# Patient Record
Sex: MALE | Race: ASIAN | NOT HISPANIC OR LATINO | URBAN - METROPOLITAN AREA
[De-identification: names, ages, dates, MRNs, and addresses within clinical notes are randomized per-mention and may not be internally consistent; named-entity substitution may affect disease eponyms.]

---

## 2017-08-15 ENCOUNTER — INPATIENT (INPATIENT)
Facility: HOSPITAL | Age: 52
LOS: 1 days | Discharge: HOME | End: 2017-08-17
Attending: INTERNAL MEDICINE | Admitting: INTERNAL MEDICINE
Payer: MEDICAID

## 2017-08-15 VITALS
WEIGHT: 160.06 LBS | HEIGHT: 67 IN | OXYGEN SATURATION: 98 % | SYSTOLIC BLOOD PRESSURE: 155 MMHG | DIASTOLIC BLOOD PRESSURE: 89 MMHG | RESPIRATION RATE: 115 BRPM

## 2017-08-15 LAB
APTT BLD: 25.9 SEC — LOW (ref 27.5–37.4)
BASOPHILS # BLD AUTO: 0.1 K/UL — SIGNIFICANT CHANGE UP (ref 0–0.2)
BASOPHILS NFR BLD AUTO: 1.8 % — SIGNIFICANT CHANGE UP (ref 0–2)
EOSINOPHIL # BLD AUTO: 0.1 K/UL — SIGNIFICANT CHANGE UP (ref 0–0.5)
EOSINOPHIL NFR BLD AUTO: 2 % — SIGNIFICANT CHANGE UP (ref 0–6)
HCT VFR BLD CALC: 45.5 % — SIGNIFICANT CHANGE UP (ref 39–50)
HGB BLD-MCNC: 15.5 G/DL — SIGNIFICANT CHANGE UP (ref 13–17)
INR BLD: 0.99 RATIO — SIGNIFICANT CHANGE UP (ref 0.88–1.16)
LACTATE SERPL-SCNC: 5.5 MMOL/L — CRITICAL HIGH (ref 0.7–2)
LYMPHOCYTES # BLD AUTO: 2 K/UL — SIGNIFICANT CHANGE UP (ref 1–3.3)
LYMPHOCYTES # BLD AUTO: 42.8 % — SIGNIFICANT CHANGE UP (ref 13–44)
MCHC RBC-ENTMCNC: 29.7 PG — SIGNIFICANT CHANGE UP (ref 27–34)
MCHC RBC-ENTMCNC: 34.1 GM/DL — SIGNIFICANT CHANGE UP (ref 32–36)
MCV RBC AUTO: 87.1 FL — SIGNIFICANT CHANGE UP (ref 80–100)
MONOCYTES # BLD AUTO: 0.5 K/UL — SIGNIFICANT CHANGE UP (ref 0–0.9)
MONOCYTES NFR BLD AUTO: 9.8 % — SIGNIFICANT CHANGE UP (ref 2–14)
NEUTROPHILS # BLD AUTO: 2 K/UL — SIGNIFICANT CHANGE UP (ref 1.8–7.4)
NEUTROPHILS NFR BLD AUTO: 43.5 % — SIGNIFICANT CHANGE UP (ref 43–77)
PLATELET # BLD AUTO: 261 K/UL — SIGNIFICANT CHANGE UP (ref 150–400)
PROTHROM AB SERPL-ACNC: 10.8 SEC — SIGNIFICANT CHANGE UP (ref 9.8–12.7)
RBC # BLD: 5.22 M/UL — SIGNIFICANT CHANGE UP (ref 4.2–5.8)
RBC # FLD: 12.7 % — SIGNIFICANT CHANGE UP (ref 11–15)
WBC # BLD: 4.6 K/UL — SIGNIFICANT CHANGE UP (ref 3.8–10.5)
WBC # FLD AUTO: 4.6 K/UL — SIGNIFICANT CHANGE UP (ref 3.8–10.5)

## 2017-08-15 PROCEDURE — 99284 EMERGENCY DEPT VISIT MOD MDM: CPT

## 2017-08-15 PROCEDURE — 71010: CPT | Mod: 26

## 2017-08-15 RX ORDER — OCTREOTIDE ACETATE 200 UG/ML
50 INJECTION, SOLUTION INTRAVENOUS; SUBCUTANEOUS ONCE
Qty: 0 | Refills: 0 | Status: COMPLETED | OUTPATIENT
Start: 2017-08-15 | End: 2017-08-15

## 2017-08-15 RX ORDER — ONDANSETRON 8 MG/1
4 TABLET, FILM COATED ORAL ONCE
Qty: 0 | Refills: 0 | Status: DISCONTINUED | OUTPATIENT
Start: 2017-08-15 | End: 2017-08-15

## 2017-08-15 RX ORDER — PIPERACILLIN AND TAZOBACTAM 4; .5 G/20ML; G/20ML
3.38 INJECTION, POWDER, LYOPHILIZED, FOR SOLUTION INTRAVENOUS ONCE
Qty: 0 | Refills: 0 | Status: COMPLETED | OUTPATIENT
Start: 2017-08-15 | End: 2017-08-15

## 2017-08-15 RX ORDER — SODIUM CHLORIDE 9 MG/ML
1000 INJECTION INTRAMUSCULAR; INTRAVENOUS; SUBCUTANEOUS ONCE
Qty: 0 | Refills: 0 | Status: COMPLETED | OUTPATIENT
Start: 2017-08-15 | End: 2017-08-15

## 2017-08-15 RX ORDER — ONDANSETRON 8 MG/1
8 TABLET, FILM COATED ORAL ONCE
Qty: 0 | Refills: 0 | Status: COMPLETED | OUTPATIENT
Start: 2017-08-15 | End: 2017-08-15

## 2017-08-15 RX ORDER — MORPHINE SULFATE 50 MG/1
4 CAPSULE, EXTENDED RELEASE ORAL ONCE
Qty: 0 | Refills: 0 | Status: DISCONTINUED | OUTPATIENT
Start: 2017-08-15 | End: 2017-08-15

## 2017-08-15 RX ORDER — SODIUM CHLORIDE 9 MG/ML
1000 INJECTION INTRAMUSCULAR; INTRAVENOUS; SUBCUTANEOUS
Qty: 0 | Refills: 0 | Status: COMPLETED | OUTPATIENT
Start: 2017-08-15 | End: 2017-08-15

## 2017-08-15 RX ORDER — SODIUM CHLORIDE 9 MG/ML
3 INJECTION INTRAMUSCULAR; INTRAVENOUS; SUBCUTANEOUS ONCE
Qty: 0 | Refills: 0 | Status: COMPLETED | OUTPATIENT
Start: 2017-08-15 | End: 2017-08-15

## 2017-08-15 RX ORDER — PANTOPRAZOLE SODIUM 20 MG/1
80 TABLET, DELAYED RELEASE ORAL ONCE
Qty: 0 | Refills: 0 | Status: COMPLETED | OUTPATIENT
Start: 2017-08-15 | End: 2017-08-15

## 2017-08-15 RX ADMIN — SODIUM CHLORIDE 3 MILLILITER(S): 9 INJECTION INTRAMUSCULAR; INTRAVENOUS; SUBCUTANEOUS at 22:31

## 2017-08-15 RX ADMIN — SODIUM CHLORIDE 2000 MILLILITER(S): 9 INJECTION INTRAMUSCULAR; INTRAVENOUS; SUBCUTANEOUS at 22:31

## 2017-08-15 RX ADMIN — PANTOPRAZOLE SODIUM 80 MILLIGRAM(S): 20 TABLET, DELAYED RELEASE ORAL at 22:46

## 2017-08-15 RX ADMIN — MORPHINE SULFATE 4 MILLIGRAM(S): 50 CAPSULE, EXTENDED RELEASE ORAL at 22:46

## 2017-08-15 RX ADMIN — MORPHINE SULFATE 4 MILLIGRAM(S): 50 CAPSULE, EXTENDED RELEASE ORAL at 23:25

## 2017-08-15 RX ADMIN — SODIUM CHLORIDE 2000 MILLILITER(S): 9 INJECTION INTRAMUSCULAR; INTRAVENOUS; SUBCUTANEOUS at 22:46

## 2017-08-15 RX ADMIN — ONDANSETRON 8 MILLIGRAM(S): 8 TABLET, FILM COATED ORAL at 22:46

## 2017-08-15 RX ADMIN — OCTREOTIDE ACETATE 50 MICROGRAM(S): 200 INJECTION, SOLUTION INTRAVENOUS; SUBCUTANEOUS at 23:47

## 2017-08-15 RX ADMIN — PIPERACILLIN AND TAZOBACTAM 200 GRAM(S): 4; .5 INJECTION, POWDER, LYOPHILIZED, FOR SOLUTION INTRAVENOUS at 23:17

## 2017-08-15 NOTE — ED ADULT NURSE NOTE - OBJECTIVE STATEMENT
pt c/o throat, chest, abd pain for 2 days with N/V. Vomited x2 at brown emesis with mucus. Redness to upper and lower lips. Denies diarhrea pt c/o throat, chest, abd pain for 2 days with N/V. Vomited x2 at brown emesis with mucus. Clots noted. Redness to upper and lower lips. Denies diarrhea

## 2017-08-15 NOTE — ED ADULT TRIAGE NOTE - CHIEF COMPLAINT QUOTE
pt c/o abd pain and vomiting x 30 minutes.  pt had wedding over weekend and had increased drinking and eating.  son states father is a daily drinker.

## 2017-08-16 DIAGNOSIS — E78.2 MIXED HYPERLIPIDEMIA: ICD-10-CM

## 2017-08-16 DIAGNOSIS — Z29.9 ENCOUNTER FOR PROPHYLACTIC MEASURES, UNSPECIFIED: ICD-10-CM

## 2017-08-16 DIAGNOSIS — F10.10 ALCOHOL ABUSE, UNCOMPLICATED: ICD-10-CM

## 2017-08-16 DIAGNOSIS — K76.0 FATTY (CHANGE OF) LIVER, NOT ELSEWHERE CLASSIFIED: ICD-10-CM

## 2017-08-16 DIAGNOSIS — K92.0 HEMATEMESIS: ICD-10-CM

## 2017-08-16 DIAGNOSIS — K27.9 PEPTIC ULCER, SITE UNSPECIFIED, UNSPECIFIED AS ACUTE OR CHRONIC, WITHOUT HEMORRHAGE OR PERFORATION: ICD-10-CM

## 2017-08-16 LAB
ALBUMIN SERPL ELPH-MCNC: 3.4 G/DL — SIGNIFICANT CHANGE UP (ref 3.3–5)
ALBUMIN SERPL ELPH-MCNC: 3.8 G/DL — SIGNIFICANT CHANGE UP (ref 3.3–5)
ALP SERPL-CCNC: 32 U/L — LOW (ref 40–120)
ALP SERPL-CCNC: 40 U/L — SIGNIFICANT CHANGE UP (ref 40–120)
ALT FLD-CCNC: 59 U/L — SIGNIFICANT CHANGE UP (ref 12–78)
ALT FLD-CCNC: 71 U/L — SIGNIFICANT CHANGE UP (ref 12–78)
ANION GAP SERPL CALC-SCNC: 17 MMOL/L — SIGNIFICANT CHANGE UP (ref 5–17)
ANION GAP SERPL CALC-SCNC: 18 MMOL/L — HIGH (ref 5–17)
APPEARANCE UR: ABNORMAL
AST SERPL-CCNC: 57 U/L — HIGH (ref 15–37)
AST SERPL-CCNC: 76 U/L — HIGH (ref 15–37)
BACTERIA # UR AUTO: ABNORMAL
BILIRUB SERPL-MCNC: 0.7 MG/DL — SIGNIFICANT CHANGE UP (ref 0.2–1.2)
BILIRUB SERPL-MCNC: 1 MG/DL — SIGNIFICANT CHANGE UP (ref 0.2–1.2)
BILIRUB UR-MCNC: NEGATIVE — SIGNIFICANT CHANGE UP
BLD GP AB SCN SERPL QL: SIGNIFICANT CHANGE UP
BUN SERPL-MCNC: 11 MG/DL — SIGNIFICANT CHANGE UP (ref 7–23)
BUN SERPL-MCNC: 9 MG/DL — SIGNIFICANT CHANGE UP (ref 7–23)
CALCIUM SERPL-MCNC: 7.8 MG/DL — LOW (ref 8.5–10.1)
CALCIUM SERPL-MCNC: 8.5 MG/DL — SIGNIFICANT CHANGE UP (ref 8.5–10.1)
CHLORIDE SERPL-SCNC: 104 MMOL/L — SIGNIFICANT CHANGE UP (ref 96–108)
CHLORIDE SERPL-SCNC: 99 MMOL/L — SIGNIFICANT CHANGE UP (ref 96–108)
CK MB BLD-MCNC: 0.7 % — SIGNIFICANT CHANGE UP (ref 0–3.5)
CK MB CFR SERPL CALC: 3.7 NG/ML — HIGH (ref 0.5–3.6)
CK SERPL-CCNC: 536 U/L — HIGH (ref 26–308)
CO2 SERPL-SCNC: 22 MMOL/L — SIGNIFICANT CHANGE UP (ref 22–31)
CO2 SERPL-SCNC: 25 MMOL/L — SIGNIFICANT CHANGE UP (ref 22–31)
COLOR SPEC: YELLOW — SIGNIFICANT CHANGE UP
COMMENT - URINE: SIGNIFICANT CHANGE UP
CREAT SERPL-MCNC: 0.9 MG/DL — SIGNIFICANT CHANGE UP (ref 0.5–1.3)
CREAT SERPL-MCNC: 0.92 MG/DL — SIGNIFICANT CHANGE UP (ref 0.5–1.3)
DIFF PNL FLD: ABNORMAL
EPI CELLS # UR: SIGNIFICANT CHANGE UP
GLUCOSE SERPL-MCNC: 123 MG/DL — HIGH (ref 70–99)
GLUCOSE SERPL-MCNC: 82 MG/DL — SIGNIFICANT CHANGE UP (ref 70–99)
GLUCOSE UR QL: NEGATIVE MG/DL — SIGNIFICANT CHANGE UP
GRAN CASTS # UR COMP ASSIST: ABNORMAL /LPF
HCT VFR BLD CALC: 39.9 % — SIGNIFICANT CHANGE UP (ref 39–50)
HCT VFR BLD CALC: 40.2 % — SIGNIFICANT CHANGE UP (ref 39–50)
HGB BLD-MCNC: 12.7 G/DL — LOW (ref 13–17)
HGB BLD-MCNC: 12.8 G/DL — LOW (ref 13–17)
KETONES UR-MCNC: ABNORMAL
LACTATE SERPL-SCNC: 1.1 MMOL/L — SIGNIFICANT CHANGE UP (ref 0.7–2)
LACTATE SERPL-SCNC: 3.7 MMOL/L — HIGH (ref 0.7–2)
LEUKOCYTE ESTERASE UR-ACNC: NEGATIVE — SIGNIFICANT CHANGE UP
MAGNESIUM SERPL-MCNC: 2 MG/DL — SIGNIFICANT CHANGE UP (ref 1.6–2.6)
MCHC RBC-ENTMCNC: 29.2 PG — SIGNIFICANT CHANGE UP (ref 27–34)
MCHC RBC-ENTMCNC: 29.3 PG — SIGNIFICANT CHANGE UP (ref 27–34)
MCHC RBC-ENTMCNC: 31.9 GM/DL — LOW (ref 32–36)
MCHC RBC-ENTMCNC: 31.9 GM/DL — LOW (ref 32–36)
MCV RBC AUTO: 91.5 FL — SIGNIFICANT CHANGE UP (ref 80–100)
MCV RBC AUTO: 91.9 FL — SIGNIFICANT CHANGE UP (ref 80–100)
NITRITE UR-MCNC: NEGATIVE — SIGNIFICANT CHANGE UP
OB PNL STL: NEGATIVE — SIGNIFICANT CHANGE UP
PH UR: 8 — SIGNIFICANT CHANGE UP (ref 5–8)
PHOSPHATE SERPL-MCNC: 3.4 MG/DL — SIGNIFICANT CHANGE UP (ref 2.5–4.5)
PLATELET # BLD AUTO: 173 K/UL — SIGNIFICANT CHANGE UP (ref 150–400)
PLATELET # BLD AUTO: 185 K/UL — SIGNIFICANT CHANGE UP (ref 150–400)
POTASSIUM SERPL-MCNC: 3.1 MMOL/L — LOW (ref 3.5–5.3)
POTASSIUM SERPL-MCNC: 4 MMOL/L — SIGNIFICANT CHANGE UP (ref 3.5–5.3)
POTASSIUM SERPL-SCNC: 3.1 MMOL/L — LOW (ref 3.5–5.3)
POTASSIUM SERPL-SCNC: 4 MMOL/L — SIGNIFICANT CHANGE UP (ref 3.5–5.3)
PROT SERPL-MCNC: 6.8 GM/DL — SIGNIFICANT CHANGE UP (ref 6–8.3)
PROT SERPL-MCNC: 7.7 GM/DL — SIGNIFICANT CHANGE UP (ref 6–8.3)
PROT UR-MCNC: 30 MG/DL
RBC # BLD: 4.36 M/UL — SIGNIFICANT CHANGE UP (ref 4.2–5.8)
RBC # BLD: 4.38 M/UL — SIGNIFICANT CHANGE UP (ref 4.2–5.8)
RBC # FLD: 13 % — SIGNIFICANT CHANGE UP (ref 11–15)
RBC # FLD: 13.5 % — SIGNIFICANT CHANGE UP (ref 11–15)
RBC CASTS # UR COMP ASSIST: SIGNIFICANT CHANGE UP /HPF (ref 0–4)
SODIUM SERPL-SCNC: 142 MMOL/L — SIGNIFICANT CHANGE UP (ref 135–145)
SODIUM SERPL-SCNC: 143 MMOL/L — SIGNIFICANT CHANGE UP (ref 135–145)
SP GR SPEC: 1.01 — SIGNIFICANT CHANGE UP (ref 1.01–1.02)
TROPONIN I SERPL-MCNC: <.015 NG/ML — SIGNIFICANT CHANGE UP (ref 0.01–0.04)
UROBILINOGEN FLD QL: NEGATIVE MG/DL — SIGNIFICANT CHANGE UP
WBC # BLD: 6.3 K/UL — SIGNIFICANT CHANGE UP (ref 3.8–10.5)
WBC # BLD: 9.4 K/UL — SIGNIFICANT CHANGE UP (ref 3.8–10.5)
WBC # FLD AUTO: 6.3 K/UL — SIGNIFICANT CHANGE UP (ref 3.8–10.5)
WBC # FLD AUTO: 9.4 K/UL — SIGNIFICANT CHANGE UP (ref 3.8–10.5)
WBC UR QL: SIGNIFICANT CHANGE UP

## 2017-08-16 PROCEDURE — 74174 CTA ABD&PLVS W/CONTRAST: CPT | Mod: 26

## 2017-08-16 PROCEDURE — 88312 SPECIAL STAINS GROUP 1: CPT | Mod: 26

## 2017-08-16 PROCEDURE — 93010 ELECTROCARDIOGRAM REPORT: CPT

## 2017-08-16 PROCEDURE — 12345: CPT | Mod: NC

## 2017-08-16 PROCEDURE — 99223 1ST HOSP IP/OBS HIGH 75: CPT

## 2017-08-16 PROCEDURE — 88305 TISSUE EXAM BY PATHOLOGIST: CPT | Mod: 26

## 2017-08-16 RX ORDER — SODIUM CHLORIDE 9 MG/ML
1000 INJECTION, SOLUTION INTRAVENOUS
Qty: 0 | Refills: 0 | Status: DISCONTINUED | OUTPATIENT
Start: 2017-08-16 | End: 2017-08-16

## 2017-08-16 RX ORDER — PANTOPRAZOLE SODIUM 20 MG/1
40 TABLET, DELAYED RELEASE ORAL EVERY 12 HOURS
Qty: 0 | Refills: 0 | Status: DISCONTINUED | OUTPATIENT
Start: 2017-08-16 | End: 2017-08-17

## 2017-08-16 RX ORDER — MORPHINE SULFATE 50 MG/1
2 CAPSULE, EXTENDED RELEASE ORAL ONCE
Qty: 0 | Refills: 0 | Status: DISCONTINUED | OUTPATIENT
Start: 2017-08-16 | End: 2017-08-16

## 2017-08-16 RX ORDER — METOCLOPRAMIDE HCL 10 MG
10 TABLET ORAL EVERY 8 HOURS
Qty: 0 | Refills: 0 | Status: DISCONTINUED | OUTPATIENT
Start: 2017-08-16 | End: 2017-08-17

## 2017-08-16 RX ORDER — OCTREOTIDE ACETATE 200 UG/ML
50 INJECTION, SOLUTION INTRAVENOUS; SUBCUTANEOUS
Qty: 500 | Refills: 0 | Status: DISCONTINUED | OUTPATIENT
Start: 2017-08-16 | End: 2017-08-16

## 2017-08-16 RX ORDER — LIDOCAINE 4 G/100G
10 CREAM TOPICAL ONCE
Qty: 0 | Refills: 0 | Status: COMPLETED | OUTPATIENT
Start: 2017-08-16 | End: 2017-08-16

## 2017-08-16 RX ORDER — MORPHINE SULFATE 50 MG/1
1 CAPSULE, EXTENDED RELEASE ORAL EVERY 4 HOURS
Qty: 0 | Refills: 0 | Status: DISCONTINUED | OUTPATIENT
Start: 2017-08-16 | End: 2017-08-17

## 2017-08-16 RX ORDER — BENZOCAINE AND MENTHOL 5; 1 G/100ML; G/100ML
1 LIQUID ORAL EVERY 8 HOURS
Qty: 0 | Refills: 0 | Status: DISCONTINUED | OUTPATIENT
Start: 2017-08-16 | End: 2017-08-17

## 2017-08-16 RX ORDER — SODIUM CHLORIDE 9 MG/ML
1000 INJECTION INTRAMUSCULAR; INTRAVENOUS; SUBCUTANEOUS
Qty: 0 | Refills: 0 | Status: DISCONTINUED | OUTPATIENT
Start: 2017-08-16 | End: 2017-08-17

## 2017-08-16 RX ORDER — MORPHINE SULFATE 50 MG/1
4 CAPSULE, EXTENDED RELEASE ORAL ONCE
Qty: 0 | Refills: 0 | Status: DISCONTINUED | OUTPATIENT
Start: 2017-08-16 | End: 2017-08-16

## 2017-08-16 RX ORDER — ATORVASTATIN CALCIUM 80 MG/1
20 TABLET, FILM COATED ORAL AT BEDTIME
Qty: 0 | Refills: 0 | Status: DISCONTINUED | OUTPATIENT
Start: 2017-08-16 | End: 2017-08-17

## 2017-08-16 RX ORDER — LIDOCAINE 4 G/100G
2 CREAM TOPICAL ONCE
Qty: 0 | Refills: 0 | Status: COMPLETED | OUTPATIENT
Start: 2017-08-16 | End: 2017-08-16

## 2017-08-16 RX ORDER — POTASSIUM CHLORIDE 20 MEQ
40 PACKET (EA) ORAL ONCE
Qty: 0 | Refills: 0 | Status: COMPLETED | OUTPATIENT
Start: 2017-08-16 | End: 2017-08-16

## 2017-08-16 RX ADMIN — MORPHINE SULFATE 1 MILLIGRAM(S): 50 CAPSULE, EXTENDED RELEASE ORAL at 22:30

## 2017-08-16 RX ADMIN — MORPHINE SULFATE 1 MILLIGRAM(S): 50 CAPSULE, EXTENDED RELEASE ORAL at 04:47

## 2017-08-16 RX ADMIN — LIDOCAINE 10 MILLILITER(S): 4 CREAM TOPICAL at 00:44

## 2017-08-16 RX ADMIN — BENZOCAINE AND MENTHOL 1 LOZENGE: 5; 1 LIQUID ORAL at 10:26

## 2017-08-16 RX ADMIN — MORPHINE SULFATE 4 MILLIGRAM(S): 50 CAPSULE, EXTENDED RELEASE ORAL at 02:45

## 2017-08-16 RX ADMIN — ATORVASTATIN CALCIUM 20 MILLIGRAM(S): 80 TABLET, FILM COATED ORAL at 22:31

## 2017-08-16 RX ADMIN — MORPHINE SULFATE 2 MILLIGRAM(S): 50 CAPSULE, EXTENDED RELEASE ORAL at 07:10

## 2017-08-16 RX ADMIN — SODIUM CHLORIDE 2000 MILLILITER(S): 9 INJECTION INTRAMUSCULAR; INTRAVENOUS; SUBCUTANEOUS at 00:28

## 2017-08-16 RX ADMIN — MORPHINE SULFATE 1 MILLIGRAM(S): 50 CAPSULE, EXTENDED RELEASE ORAL at 17:00

## 2017-08-16 RX ADMIN — SODIUM CHLORIDE 100 MILLILITER(S): 9 INJECTION INTRAMUSCULAR; INTRAVENOUS; SUBCUTANEOUS at 13:38

## 2017-08-16 RX ADMIN — MORPHINE SULFATE 1 MILLIGRAM(S): 50 CAPSULE, EXTENDED RELEASE ORAL at 16:44

## 2017-08-16 RX ADMIN — SODIUM CHLORIDE 100 MILLILITER(S): 9 INJECTION, SOLUTION INTRAVENOUS at 15:18

## 2017-08-16 RX ADMIN — SODIUM CHLORIDE 2000 MILLILITER(S): 9 INJECTION INTRAMUSCULAR; INTRAVENOUS; SUBCUTANEOUS at 02:05

## 2017-08-16 RX ADMIN — MORPHINE SULFATE 1 MILLIGRAM(S): 50 CAPSULE, EXTENDED RELEASE ORAL at 22:45

## 2017-08-16 RX ADMIN — LIDOCAINE 2 MILLILITER(S): 4 CREAM TOPICAL at 06:56

## 2017-08-16 RX ADMIN — MORPHINE SULFATE 1 MILLIGRAM(S): 50 CAPSULE, EXTENDED RELEASE ORAL at 05:03

## 2017-08-16 RX ADMIN — PANTOPRAZOLE SODIUM 40 MILLIGRAM(S): 20 TABLET, DELAYED RELEASE ORAL at 05:21

## 2017-08-16 RX ADMIN — MORPHINE SULFATE 1 MILLIGRAM(S): 50 CAPSULE, EXTENDED RELEASE ORAL at 09:46

## 2017-08-16 RX ADMIN — MORPHINE SULFATE 4 MILLIGRAM(S): 50 CAPSULE, EXTENDED RELEASE ORAL at 02:05

## 2017-08-16 RX ADMIN — PANTOPRAZOLE SODIUM 40 MILLIGRAM(S): 20 TABLET, DELAYED RELEASE ORAL at 17:54

## 2017-08-16 RX ADMIN — MORPHINE SULFATE 1 MILLIGRAM(S): 50 CAPSULE, EXTENDED RELEASE ORAL at 10:00

## 2017-08-16 RX ADMIN — BENZOCAINE AND MENTHOL 1 LOZENGE: 5; 1 LIQUID ORAL at 13:38

## 2017-08-16 RX ADMIN — OCTREOTIDE ACETATE 10 MICROGRAM(S)/HR: 200 INJECTION, SOLUTION INTRAVENOUS; SUBCUTANEOUS at 03:53

## 2017-08-16 RX ADMIN — MORPHINE SULFATE 2 MILLIGRAM(S): 50 CAPSULE, EXTENDED RELEASE ORAL at 06:54

## 2017-08-16 RX ADMIN — Medication 30 MILLILITER(S): at 00:38

## 2017-08-16 RX ADMIN — BENZOCAINE AND MENTHOL 1 LOZENGE: 5; 1 LIQUID ORAL at 22:31

## 2017-08-16 RX ADMIN — SODIUM CHLORIDE 100 MILLILITER(S): 9 INJECTION INTRAMUSCULAR; INTRAVENOUS; SUBCUTANEOUS at 03:53

## 2017-08-16 NOTE — H&P ADULT - PROBLEM SELECTOR PLAN 1
UGI bleed most likely non-variceal bleed vs esophagitis vs PUD/bleeding ulcer  - Proton pump inhibitor IV,   - Maintain NPO  - 2 large gouge IV lines  - Monitor serial hemoglobin  - Monitor electrolytes and replace as needed, K supplemented in ED  -Repeat lactate  - Avoid antiplatelets and antithrombotic medication  -GI consult

## 2017-08-16 NOTE — H&P ADULT - NSHPREVIEWOFSYSTEMS_GEN_ALL_CORE
No fever/chills, No photophobia/eye pain/changes in vision,  No chest pain/palpitations, no SOB/cough/wheeze/stridor, No diarrhea, no dark stools, no dysuria/frequency/discharge, No neck/back pain, no rash, no changes in neurological status/function.

## 2017-08-16 NOTE — ED PROVIDER NOTE - OBJECTIVE STATEMENT
52yoM; with pmh signif for alcohol abuse, PUD; now p/w coffee ground emesis and black tarry stools x2days. patient drink 1-2 glasses of vodka daily, last drink 2 days ago. denies nsaid use. admitted to [facility] 1 week ago for ?pud.  now p/w black tarry stools x2 days and coffee ground emesis x1 day--x7-8 episodes. c/o abd pain--epigastric, cramping, intermittent, radiaing through chest and throat with burning.    c/o lightheadedness and near syncope with standing.    denies dysuria, hematuria, frequency, urgency. denies headache.  denies numbness/tingling. denies focal weakness.

## 2017-08-16 NOTE — PROGRESS NOTE ADULT - SUBJECTIVE AND OBJECTIVE BOX
Upper esophagogastroduodenoscopy/ENDOSCOPY    -Informed consent obtained from patient prior to exam.     Indication: UGI bleed    Anesthesia:   provided by:    Esophogus: ulcerative esophagitis.Bx, no varices    Stomach:diffuse eythema,bx antrum and fundus    Duodenum: normal,bx second portion    The patient tolerated the procedure well.    Findings:Ulcerative Esophagitis  Gastritis    Plan: Protonix 40 mg PO daily for 8 weeks then repeat egd

## 2017-08-16 NOTE — ED PROVIDER NOTE - PHYSICAL EXAMINATION
General:     NAD, well-nourished, well-appearing  Head:     NC/AT, EOMI, oral mucosa moist  Neck:     trachea midline  Lungs:     CTA b/l, no w/r/r  CVS:     S1S2, RRR, no m/g/r  Abd:     +BS, ttp @ epigastrium, no rebound or guarding, s/nd, no organomegaly  Ext:    2+ radial and pedal pulses, no c/c/e  Neuro: AAOx3, no sensory/motor deficits

## 2017-08-16 NOTE — H&P ADULT - ASSESSMENT
52 year old man with PMH gerd and HLD presents with complaint of upper abdominal pain, nausea, and several episodes of vomiting streaked with blood; signs, symptoms, and work up consistent for possible bleeding gastric ulcer.  Patient will require admission for at least 2 midnights as detailed below:

## 2017-08-16 NOTE — CONSULT NOTE ADULT - SUBJECTIVE AND OBJECTIVE BOX
Chief Complaint:  Patient is a 52y old  Male who presents with a chief complaint of abdominal pain, hematemesis (16 Aug 2017 03:36)      HPI:  52 year old man with PMH gerd and HLD presents with complaint of upper abdominal pain, nausea, and several episodes of vomiting streaked with blood and Coffee ground emesis.  He states that he has had similar pain with nausea for many months.  He drinks whiskey socially, about once or twice per week.  He says his daughter was recently  and he "overdid it" and had several whiskies to celebrate.  He was seen in [facility] 2 weeks ago for the same (but less severe) complaint and was discharged with outpatient GI follow up.       PMH/PSH:PAST MEDICAL & SURGICAL HISTORY:  Mixed hyperlipidemia  PUD (peptic ulcer disease)  No significant past surgical history      Allergies:  No Known Allergies      Medications:  metoclopramide Injectable 10 milliGRAM(s) IV Push every 8 hours PRN  pantoprazole  Injectable 40 milliGRAM(s) IV Push every 12 hours  sodium chloride 0.9%. 1000 milliLiter(s) IV Continuous <Continuous>  atorvastatin 20 milliGRAM(s) Oral at bedtime  morphine  - Injectable 1 milliGRAM(s) IV Push every 4 hours PRN  benzocaine 15 mG/menthol 3.6 mG Lozenge 1 Lozenge Oral every 8 hours      Review of Systems:    General:  No weight loss, fevers, chills, night sweats, fatigue,   Eyes:  Good vision, no reported pain  ENT:  No sore throat, pain, runny nose, dysphagia  CV:  No pain, palpitations, hypo/hypertension  Resp:  No dyspnea, cough, tachypnea, wheezing  GI:  +epigastric pain, + nausea, + vomiting, No diarrhea, No constipation, No pruritis, No rectal bleeding, No tarry stools, No dysphagia,  :  No pain, bleeding, incontinence, nocturia  Muscle:  No pain, weakness  Breast:  No pain, abscess, mass, discharge  Neuro:  No weakness, tingling, memory problems  Psych:  No fatigue, insomnia, mood problems, depression  Endocrine:  No polyuria, polydypsia, cold/heat intolerance  Heme:  No petechiae, ecchymosis, easy bruisability  Skin:  No rash, tattoos, scars, edema    Relevant Family History:   FAMILY HISTORY:  No pertinent family history in first degree relatives      Relevant Social History: Drinks whiskey socially, denies tobacco, illicit drugs, ETOH abuse 4days ago    Physical Exam:    Vital Signs:  Vital Signs Last 24 Hrs  T(C): 36.9 (16 Aug 2017 13:02), Max: 37.7 (16 Aug 2017 04:46)  T(F): 98.5 (16 Aug 2017 13:02), Max: 99.9 (16 Aug 2017 04:46)  HR: 51 (16 Aug 2017 13:02) (51 - 70)  BP: 127/74 (16 Aug 2017 13:02) (118/65 - 168/83)  BP(mean): --  RR: 18 (16 Aug 2017 13:02) (14 - 115)  SpO2: 97% (16 Aug 2017 13:02) (94% - 100%)  Daily Height in cm: 172.72 (16 Aug 2017 05:58)    Daily     General:  Appears stated age, well-groomed, well-nourished, no distress  HEENT:  NC/AT,  conjunctivae clear and pink, no thyromegaly, nodules, adenopathy, no JVD  Chest:  Full & symmetric excursion, no increased effort, breath sounds clear  Cardiovascular:  Regular rhythm, S1, S2, no murmur/rub/S3/S4, no abdominal bruit, no edema  Abdomen:  Soft, +epigastric tenderness, non-distended, normoactive bowel sounds,  no masses , no hepatosplenomegaly, no signs of chronic liver disease  Extremities:  no cyanosis, clubbing or edema  Skin:  No rash/erythema/ecchymoses/petechiae/wounds/abscess/warm/dry  Neuro/Psych:  Alert, oriented, no asterixis, no tremor, no encephalopathy    Laboratory:                          12.8   9.4   )-----------( 185      ( 16 Aug 2017 08:01 )             40.2     08-16    143  |  104  |  9   ----------------------------<  123<H>  4.0   |  22  |  0.90    Ca    7.8<L>      16 Aug 2017 08:01  Phos  3.4     08-16  Mg     2.0     08-16    TPro  6.8  /  Alb  3.4  /  TBili  1.0  /  DBili  x   /  AST  57<H>  /  ALT  59  /  AlkPhos  32<L>      LIVER FUNCTIONS - ( 16 Aug 2017 08:01 )  Alb: 3.4 g/dL / Pro: 6.8 gm/dL / ALK PHOS: 32 U/L / ALT: 59 U/L / AST: 57 U/L / GGT: x           PT/INR - ( 15 Aug 2017 22:09 )   PT: 10.8 sec;   INR: 0.99 ratio         PTT - ( 15 Aug 2017 22:09 )  PTT:25.9 sec  Urinalysis Basic - ( 16 Aug 2017 02:25 )    Color: Yellow / Appearance: x / S.015 / pH: x  Gluc: x / Ketone: Moderate  / Bili: Negative / Urobili: Negative mg/dL   Blood: x / Protein: 30 mg/dL / Nitrite: Negative   Leuk Esterase: Negative / RBC: 0-2 /HPF / WBC 0-2   Sq Epi: x / Non Sq Epi: x / Bacteria: Few          Intake and Output    08-15-17 @ 07:01  -  17 @ 07:00  --------------------------------------------------------  IN: 0 mL / OUT: 500 mL / NET: -500 mL        Imaging:      EXAM:  CT ANGIO ABD PELV (W)AW IC                            PROCEDURE DATE:  2017          INTERPRETATION:  CLINICAL INFORMATION: Abdominal pain and GI bleeding    COMPARISON: None    PROCEDURE:   CT of the Abdomen and Pelvis was performed with and without intravenous   contrast.  Precontrast, Arterial and Delayed phases were performed.  Intravenous contrast: 99 ml Omnipaque 350. 1 ml discarded.  Oral contrast: None.  Sagittal and coronal reformats were performed. Axial MIPS are provided.    FINDINGS:    LOWER CHEST: Within normal limits.    LIVER: Diffuse hepatic steatosis.  BILE DUCTS: Normal caliber.  GALLBLADDER: Sludge in the gallbladder lumen.  SPLEEN: Within normal limits.  PANCREAS: Within normal limits.  ADRENALS: Tiny myolipoma on the medial limb of the right adrenal gland..  KIDNEYS/URETERS: Within normal limits.    BLADDER: Within normal limits.  REPRODUCTIVE ORGANS: The prostate is within normal limits.     BOWEL: No bowel obstruction. Appendix normal. Diverticuli ofthe cecum.   No evidence for active bowel inflammation. No evidence for active GI   bleeding.  PERITONEUM: No ascites.  VESSELS:  Patent vasculature without evidence for active extravasation.  RETROPERITONEUM: No lymphadenopathy.    ABDOMINAL WALL: Within normal limits.  BONES: Within normal limits.    IMPRESSION: No evidence for active GI bleeding.      TAMRA KING M.D., ATTENDING RADIOLOGIST  This document has been electronically signed. Aug 16 2017  2:10AM

## 2017-08-16 NOTE — CONSULT NOTE ADULT - PROBLEM SELECTOR RECOMMENDATION 9
IV PPI Q12  NPO for EGD this afternoon   Risks, benefits and alternatives discussed at length with patient  and informed consent obtained. All questions were answered.

## 2017-08-16 NOTE — H&P ADULT - NSHPLABSRESULTS_GEN_ALL_CORE
Vital Signs Last 24 Hrs  T(C): 36.8 (16 Aug 2017 03:49), Max: 36.8 (16 Aug 2017 03:49)  T(F): 98.2 (16 Aug 2017 03:49), Max: 98.2 (16 Aug 2017 03:49)  HR: 66 (16 Aug 2017 03:49) (65 - 66)  BP: 134/74 (16 Aug 2017 03:49) (119/62 - 168/83)  BP(mean): --  RR: 16 (16 Aug 2017 03:49) (16 - 115)  SpO2: 98% (16 Aug 2017 03:49) (97% - 100%)        LABS:                        15.5   4.6   )-----------( 261      ( 15 Aug 2017 22:09 )             45.5     08-15    142  |  99  |  11  ----------------------------<  82  3.1<L>   |  25  |  0.92    Ca    8.5      15 Aug 2017 23:44    TPro  7.7  /  Alb  3.8  /  TBili  0.7  /  DBili  x   /  AST  76<H>  /  ALT  71  /  AlkPhos  40  08-15    PT/INR - ( 15 Aug 2017 22:09 )   PT: 10.8 sec;   INR: 0.99 ratio         PTT - ( 15 Aug 2017 22:09 )  PTT:25.9 sec  Urinalysis Basic - ( 16 Aug 2017 02:25 )    Color: Yellow / Appearance: x / S.015 / pH: x  Gluc: x / Ketone: Moderate  / Bili: Negative / Urobili: Negative mg/dL   Blood: x / Protein: 30 mg/dL / Nitrite: Negative   Leuk Esterase: Negative / RBC: 0-2 /HPF / WBC 0-2   Sq Epi: x / Non Sq Epi: x / Bacteria: Few        RADIOLOGY & ADDITIONAL STUDIES:    CT ab/plv:  IMPRESSION: No evidence for active GI bleeding.

## 2017-08-16 NOTE — H&P ADULT - HISTORY OF PRESENT ILLNESS
52 year old man with PMH gerd and HLD presents with complaint of upper abdominal pain, nausea, and several episodes of vomiting streaked with blood.  He states that he has had similar pain with nausea for many months.  He drinks whiskey socially, about once or twice per week.  He says his daughter was recently  and he "overdid it" and had several whiskies to celebrate.  He was seen in [facility] 2 weeks ago for the same (but less severe) complaint and was discharged with outpatient GI follow up.     In the ED, h/h stable, vital signs stable, CT ab/plv showed no evidence of active bleeding.  Lactate 5.5-->3.7 after hydration.  LFT mildly elevated.

## 2017-08-16 NOTE — CHART NOTE - NSCHARTNOTEFT_GEN_A_CORE
pt seen and examined, still complaining of abdominal discomfort n/v, seen w dried blood by mouth  states has been vomiting for past 2days noticed bloody streaks. Binge drank over the weekend at party. Normally only drinks on special occasions  Proton pump inhibitor IV,   - Maintain NPO  monitor cbc  IVF  To be seen by GI today     PE normal with exception of some epigastric tenderness    Vital Signs Last 24 Hrs  T(C): 37.7 (16 Aug 2017 05:58), Max: 37.7 (16 Aug 2017 04:46)  T(F): 99.9 (16 Aug 2017 05:58), Max: 99.9 (16 Aug 2017 04:46)  HR: 67 (16 Aug 2017 05:58) (65 - 70)  BP: 135/85 (16 Aug 2017 05:58) (118/65 - 168/83)  BP(mean): --  RR: 18 (16 Aug 2017 05:58) (14 - 115)  SpO2: 94% (16 Aug 2017 05:58) (94% - 100%)                    12.8   9.4   )-----------( 185      ( 16 Aug 2017 08:01 )             40.2     08-16    143  |  104  |  9   ----------------------------<  123<H>  4.0   |  22  |  0.90    Ca    7.8<L>      16 Aug 2017 08:01  Phos  3.4       Mg     2.0         TPro  6.8  /  Alb  3.4  /  TBili  1.0  /  DBili  x   /  AST  57<H>  /  ALT  59  /  AlkPhos  32<L>  -16    PT/INR - ( 15 Aug 2017 22:09 )   PT: 10.8 sec;   INR: 0.99 ratio         PTT - ( 15 Aug 2017 22:09 )  PTT:25.9 sec  Urinalysis Basic - ( 16 Aug 2017 02:25 )    Color: Yellow / Appearance: x / S.015 / pH: x  Gluc: x / Ketone: Moderate  / Bili: Negative / Urobili: Negative mg/dL   Blood: x / Protein: 30 mg/dL / Nitrite: Negative   Leuk Esterase: Negative / RBC: 0-2 /HPF / WBC 0-2   Sq Epi: x / Non Sq Epi: x / Bacteria: Few

## 2017-08-16 NOTE — H&P ADULT - NSHPPHYSICALEXAM_GEN_ALL_CORE
GENERAL: NAD, well-groomed, well-developed  HEAD:  Atraumatic, Normocephalic  EYES: EOMI, PERRLA, conjunctiva and sclera clear  ENMT: No tonsillar erythema, exudates, or enlargement; Moist mucous membranes, No lesions  NECK: Supple, No JVD, Normal thyroid  NERVOUS SYSTEM:  Alert & Oriented X3, Good concentration  CHEST/LUNG: Clear to auscultation bilaterally; No rales, rhonchi, wheezing, or rubs  HEART: Regular rate and rhythm; No murmurs, rubs, or gallops  ABDOMEN: Soft, epigastric tenderness without rebound or guarding Nondistended; Bowel sounds present  EXTREMITIES: No clubbing, cyanosis, or edema  SKIN: no rashes or lesions  PSYCH: normal affect and behavior GENERAL: Distress due to nausea, well-groomed, well-developed  HEAD:  Atraumatic, Normocephalic  EYES: EOMI, PERRLA, conjunctiva and sclera clear  ENMT: No tonsillar erythema, exudates, or enlargement; Moist mucous membranes, No lesions  NECK: Supple, No JVD, Normal thyroid  NERVOUS SYSTEM:  Alert & Oriented X3, Good concentration  CHEST/LUNG: Clear to auscultation bilaterally; No rales, rhonchi, wheezing, or rubs  HEART: Regular rate and rhythm; No murmurs, rubs, or gallops  ABDOMEN: Soft, epigastric tenderness without rebound or guarding, Nondistended; Bowel sounds present  EXTREMITIES: No clubbing, cyanosis, or edema  SKIN: no rashes or lesions  PSYCH: normal affect and behavior

## 2017-08-17 ENCOUNTER — TRANSCRIPTION ENCOUNTER (OUTPATIENT)
Age: 52
End: 2017-08-17

## 2017-08-17 VITALS
SYSTOLIC BLOOD PRESSURE: 137 MMHG | OXYGEN SATURATION: 98 % | RESPIRATION RATE: 17 BRPM | DIASTOLIC BLOOD PRESSURE: 83 MMHG | HEART RATE: 69 BPM | TEMPERATURE: 98 F

## 2017-08-17 LAB
ANION GAP SERPL CALC-SCNC: 11 MMOL/L — SIGNIFICANT CHANGE UP (ref 5–17)
BUN SERPL-MCNC: 10 MG/DL — SIGNIFICANT CHANGE UP (ref 7–23)
CALCIUM SERPL-MCNC: 8.3 MG/DL — LOW (ref 8.5–10.1)
CHLORIDE SERPL-SCNC: 105 MMOL/L — SIGNIFICANT CHANGE UP (ref 96–108)
CO2 SERPL-SCNC: 24 MMOL/L — SIGNIFICANT CHANGE UP (ref 22–31)
CREAT SERPL-MCNC: 0.81 MG/DL — SIGNIFICANT CHANGE UP (ref 0.5–1.3)
GLUCOSE SERPL-MCNC: 82 MG/DL — SIGNIFICANT CHANGE UP (ref 70–99)
HCT VFR BLD CALC: 38.9 % — LOW (ref 39–50)
HGB BLD-MCNC: 12.9 G/DL — LOW (ref 13–17)
MCHC RBC-ENTMCNC: 29.9 PG — SIGNIFICANT CHANGE UP (ref 27–34)
MCHC RBC-ENTMCNC: 33.2 GM/DL — SIGNIFICANT CHANGE UP (ref 32–36)
MCV RBC AUTO: 90 FL — SIGNIFICANT CHANGE UP (ref 80–100)
PLATELET # BLD AUTO: 164 K/UL — SIGNIFICANT CHANGE UP (ref 150–400)
POTASSIUM SERPL-MCNC: 3.7 MMOL/L — SIGNIFICANT CHANGE UP (ref 3.5–5.3)
POTASSIUM SERPL-SCNC: 3.7 MMOL/L — SIGNIFICANT CHANGE UP (ref 3.5–5.3)
RBC # BLD: 4.32 M/UL — SIGNIFICANT CHANGE UP (ref 4.2–5.8)
RBC # FLD: 12.7 % — SIGNIFICANT CHANGE UP (ref 11–15)
SODIUM SERPL-SCNC: 140 MMOL/L — SIGNIFICANT CHANGE UP (ref 135–145)
WBC # BLD: 5.4 K/UL — SIGNIFICANT CHANGE UP (ref 3.8–10.5)
WBC # FLD AUTO: 5.4 K/UL — SIGNIFICANT CHANGE UP (ref 3.8–10.5)

## 2017-08-17 PROCEDURE — 99239 HOSP IP/OBS DSCHRG MGMT >30: CPT

## 2017-08-17 RX ORDER — FAMOTIDINE 10 MG/ML
1 INJECTION INTRAVENOUS
Qty: 0 | Refills: 0 | COMMUNITY

## 2017-08-17 RX ORDER — PANTOPRAZOLE SODIUM 20 MG/1
1 TABLET, DELAYED RELEASE ORAL
Qty: 30 | Refills: 0 | OUTPATIENT
Start: 2017-08-17 | End: 2017-09-16

## 2017-08-17 RX ADMIN — PANTOPRAZOLE SODIUM 40 MILLIGRAM(S): 20 TABLET, DELAYED RELEASE ORAL at 05:34

## 2017-08-17 RX ADMIN — MORPHINE SULFATE 1 MILLIGRAM(S): 50 CAPSULE, EXTENDED RELEASE ORAL at 05:54

## 2017-08-17 RX ADMIN — MORPHINE SULFATE 1 MILLIGRAM(S): 50 CAPSULE, EXTENDED RELEASE ORAL at 05:39

## 2017-08-17 NOTE — DISCHARGE NOTE ADULT - MEDICATION SUMMARY - MEDICATIONS TO STOP TAKING
I will STOP taking the medications listed below when I get home from the hospital:    Pepcid 40 mg oral tablet  -- 1 tab(s) by mouth once a day (at bedtime)

## 2017-08-17 NOTE — DISCHARGE NOTE ADULT - HOSPITAL COURSE
52 year old man with PMH gerd and HLD presents with complaint of upper abdominal pain, nausea, and several episodes of vomiting streaked with blood.  He states that he has had similar pain with nausea for many months.  He drinks whiskey socially, about once or twice per week.  He says his daughter was recently  and he "overdid it" and had several whiskies to celebrate.  He was seen in [facility] 2 weeks ago for the same (but less severe) complaint and was discharged with outpatient GI follow up.     In the ED, h/h stable, vital signs stable, CT ab/plv showed no evidence of active bleeding.  Lactate 5.5-->3.7 after hydration.  LFT mildly elevated.    HGB remained stable, nausea resolved    EGD:      Esophogus: ulcerative esophagitis.Bx, no varices    Stomach:diffuse eythema,bx antrum and fundus    Duodenum: normal,bx second portion    The patient tolerated the procedure well.    Findings:Ulcerative Esophagitis  Gastritis    Plan: Protonix 40 mg PO daily for 8 weeks then repeat egd    40 minutes spent on total discharge encounter; more than 50% of the visit was spent counseling and/or coordinating care by the attending physician.

## 2017-08-17 NOTE — DISCHARGE NOTE ADULT - PLAN OF CARE
c/w protonix, f/u with gi c/w protonix, f/u with gi, activity as tolerated, heart healthy diet abstinence

## 2017-08-17 NOTE — DISCHARGE NOTE ADULT - MEDICATION SUMMARY - MEDICATIONS TO TAKE
I will START or STAY ON the medications listed below when I get home from the hospital:    atorvastatin 20 mg oral tablet  -- 1 tab(s) by mouth once a day  -- Indication: For Hepatic steatosis    pantoprazole 40 mg oral delayed release tablet  -- 1 tab(s) by mouth once a day  -- It is very important that you take or use this exactly as directed.  Do not skip doses or discontinue unless directed by your doctor.  Obtain medical advice before taking any non-prescription drugs as some may affect the action of this medication.  Swallow whole.  Do not crush.    -- Indication: For Gastrointestinal hemorrhage with hematemesis

## 2017-08-17 NOTE — DISCHARGE NOTE ADULT - CARE PLAN
Principal Discharge DX:	Gastrointestinal hemorrhage with hematemesis  Goal:	c/w protonix, f/u with gi  Instructions for follow-up, activity and diet:	c/w protonix, f/u with gi, activity as tolerated, heart healthy diet  Secondary Diagnosis:	ETOH abuse  Goal:	abstinence  Secondary Diagnosis:	Mixed hyperlipidemia  Secondary Diagnosis:	Hepatic steatosis

## 2017-08-17 NOTE — DISCHARGE NOTE ADULT - CARE PROVIDER_API CALL
Arturo Mane), Gastroenterology; Internal Medicine  [address] [place] [address]  Phone: [phone]  Fax: [phone]

## 2017-08-18 LAB — SURGICAL PATHOLOGY FINAL REPORT - CH: SIGNIFICANT CHANGE UP

## 2017-08-20 DIAGNOSIS — K92.2 GASTROINTESTINAL HEMORRHAGE, UNSPECIFIED: ICD-10-CM

## 2017-08-20 DIAGNOSIS — K21.0 GASTRO-ESOPHAGEAL REFLUX DISEASE WITH ESOPHAGITIS: ICD-10-CM

## 2017-08-20 DIAGNOSIS — E78.2 MIXED HYPERLIPIDEMIA: ICD-10-CM

## 2017-08-20 DIAGNOSIS — K29.70 GASTRITIS, UNSPECIFIED, WITHOUT BLEEDING: ICD-10-CM

## 2017-08-20 DIAGNOSIS — K92.0 HEMATEMESIS: ICD-10-CM

## 2017-08-20 DIAGNOSIS — K76.0 FATTY (CHANGE OF) LIVER, NOT ELSEWHERE CLASSIFIED: ICD-10-CM

## 2017-08-20 DIAGNOSIS — K27.9 PEPTIC ULCER, SITE UNSPECIFIED, UNSPECIFIED AS ACUTE OR CHRONIC, WITHOUT HEMORRHAGE OR PERFORATION: ICD-10-CM

## 2017-08-20 DIAGNOSIS — F10.10 ALCOHOL ABUSE, UNCOMPLICATED: ICD-10-CM

## 2017-08-21 LAB
CULTURE RESULTS: SIGNIFICANT CHANGE UP
CULTURE RESULTS: SIGNIFICANT CHANGE UP
SPECIMEN SOURCE: SIGNIFICANT CHANGE UP
SPECIMEN SOURCE: SIGNIFICANT CHANGE UP

## 2017-09-20 ENCOUNTER — INPATIENT (INPATIENT)
Facility: HOSPITAL | Age: 52
LOS: 1 days | Discharge: HOME | End: 2017-09-22
Attending: INTERNAL MEDICINE | Admitting: INTERNAL MEDICINE
Payer: MEDICAID

## 2017-09-20 VITALS
RESPIRATION RATE: 23 BRPM | DIASTOLIC BLOOD PRESSURE: 84 MMHG | TEMPERATURE: 98 F | OXYGEN SATURATION: 98 % | SYSTOLIC BLOOD PRESSURE: 152 MMHG | HEART RATE: 144 BPM | HEIGHT: 67 IN | WEIGHT: 156.97 LBS

## 2017-09-20 PROCEDURE — 99285 EMERGENCY DEPT VISIT HI MDM: CPT

## 2017-09-20 RX ORDER — PANTOPRAZOLE SODIUM 20 MG/1
80 TABLET, DELAYED RELEASE ORAL ONCE
Qty: 0 | Refills: 0 | Status: COMPLETED | OUTPATIENT
Start: 2017-09-20 | End: 2017-09-20

## 2017-09-20 RX ORDER — SODIUM CHLORIDE 9 MG/ML
2000 INJECTION INTRAMUSCULAR; INTRAVENOUS; SUBCUTANEOUS ONCE
Qty: 0 | Refills: 0 | Status: COMPLETED | OUTPATIENT
Start: 2017-09-20 | End: 2017-09-20

## 2017-09-20 RX ORDER — PANTOPRAZOLE SODIUM 20 MG/1
8 TABLET, DELAYED RELEASE ORAL
Qty: 80 | Refills: 0 | Status: DISCONTINUED | OUTPATIENT
Start: 2017-09-20 | End: 2017-09-22

## 2017-09-20 RX ADMIN — PANTOPRAZOLE SODIUM 80 MILLIGRAM(S): 20 TABLET, DELAYED RELEASE ORAL at 23:41

## 2017-09-20 RX ADMIN — SODIUM CHLORIDE 2000 MILLILITER(S): 9 INJECTION INTRAMUSCULAR; INTRAVENOUS; SUBCUTANEOUS at 23:43

## 2017-09-20 NOTE — ED PROVIDER NOTE - PHYSICAL EXAMINATION
Gen: Alert, c/o pain  Head: NC, AT, EOMI, normal lids/conjunctiva  ENT: normal hearing, patent oropharynx, MMM  Neck: supple, no tenderness/meningismus/JVD, Trachea midline, FROM  Pulm: Bilateral clear BS, normal resp effort, no wheeze/stridor/retractions  CV: RRR, no M/R/G, +dist pulses  Abd: soft, +diffusely TTP, ND, +BS, no guarding/rebound tenderness  Mskel: no edema/erythema/cyanosis  Skin: no rash  Neuro: AAOx3, no sensory/motor deficits

## 2017-09-20 NOTE — ED PROVIDER NOTE - CARE PLAN
Principal Discharge DX:	Vomiting  Secondary Diagnosis:	Abdominal pain  Secondary Diagnosis:	Lactic acidosis

## 2017-09-20 NOTE — ED ADULT TRIAGE NOTE - CHIEF COMPLAINT QUOTE
Pt is here for vomiting blood. Pt states the vomiting started around 1700 today and he has been admitted to [facility] for the same problem.

## 2017-09-20 NOTE — ED PROVIDER NOTE - OBJECTIVE STATEMENT
Pertinent PMH/PSH/FHx/SHx and Review of Systems contained within:    51yo M w PMH of HL, PUD & ulcerative esophagitis presents to ED c/o hematemesis.  Pt admitted to ED for same last month.  Pt reports he has been compliant w meds & denies EtOH intake.  Pt states abd pain started 3d ago, then today vomiting started.  Denies fever, chills, syncope, diarrhea.  Pt tachy to 144 in triage.    No fever/chills, No photophobia/eye pain/changes in vision, No ear pain/sore throat/dysphagia, No chest pain, no SOB/cough/wheeze/stridor, +abdominal pain, No neck/back pain, no rash, no changes in neurological status/function.

## 2017-09-20 NOTE — ED PROVIDER NOTE - MEDICAL DECISION MAKING DETAILS
Pt w above dx.  VS improved w IVF.  Lactic acidosis improved and anion gap closed.  Pt admitted for further eval/mgmt.

## 2017-09-21 DIAGNOSIS — F10.10 ALCOHOL ABUSE, UNCOMPLICATED: ICD-10-CM

## 2017-09-21 DIAGNOSIS — E87.2 ACIDOSIS: ICD-10-CM

## 2017-09-21 DIAGNOSIS — K92.0 HEMATEMESIS: ICD-10-CM

## 2017-09-21 DIAGNOSIS — R10.13 EPIGASTRIC PAIN: ICD-10-CM

## 2017-09-21 DIAGNOSIS — K27.9 PEPTIC ULCER, SITE UNSPECIFIED, UNSPECIFIED AS ACUTE OR CHRONIC, WITHOUT HEMORRHAGE OR PERFORATION: ICD-10-CM

## 2017-09-21 LAB
ALBUMIN SERPL ELPH-MCNC: 4.5 G/DL — SIGNIFICANT CHANGE UP (ref 3.3–5)
ALP SERPL-CCNC: 54 U/L — SIGNIFICANT CHANGE UP (ref 40–120)
ALT FLD-CCNC: 43 U/L — SIGNIFICANT CHANGE UP (ref 12–78)
ANION GAP SERPL CALC-SCNC: 15 MMOL/L — SIGNIFICANT CHANGE UP (ref 5–17)
ANION GAP SERPL CALC-SCNC: 24 MMOL/L — HIGH (ref 5–17)
ANION GAP SERPL CALC-SCNC: 9 MMOL/L — SIGNIFICANT CHANGE UP (ref 5–17)
APPEARANCE UR: CLEAR — SIGNIFICANT CHANGE UP
APTT BLD: 25.4 SEC — LOW (ref 27.5–37.4)
AST SERPL-CCNC: 46 U/L — HIGH (ref 15–37)
BACTERIA # UR AUTO: ABNORMAL
BASE EXCESS BLDA CALC-SCNC: -2.8 MMOL/L — LOW (ref -2–2)
BASOPHILS # BLD AUTO: 0 K/UL — SIGNIFICANT CHANGE UP (ref 0–0.2)
BASOPHILS # BLD AUTO: 0.1 K/UL — SIGNIFICANT CHANGE UP (ref 0–0.2)
BASOPHILS NFR BLD AUTO: 0.6 % — SIGNIFICANT CHANGE UP (ref 0–2)
BASOPHILS NFR BLD AUTO: 0.9 % — SIGNIFICANT CHANGE UP (ref 0–2)
BILIRUB SERPL-MCNC: 0.8 MG/DL — SIGNIFICANT CHANGE UP (ref 0.2–1.2)
BILIRUB UR-MCNC: NEGATIVE — SIGNIFICANT CHANGE UP
BLD GP AB SCN SERPL QL: SIGNIFICANT CHANGE UP
BLOOD GAS COMMENTS: SIGNIFICANT CHANGE UP
BLOOD GAS COMMENTS: SIGNIFICANT CHANGE UP
BLOOD GAS SOURCE: SIGNIFICANT CHANGE UP
BUN SERPL-MCNC: 14 MG/DL — SIGNIFICANT CHANGE UP (ref 7–23)
BUN SERPL-MCNC: 14 MG/DL — SIGNIFICANT CHANGE UP (ref 7–23)
BUN SERPL-MCNC: 16 MG/DL — SIGNIFICANT CHANGE UP (ref 7–23)
CALCIUM SERPL-MCNC: 8.4 MG/DL — LOW (ref 8.5–10.1)
CALCIUM SERPL-MCNC: 8.7 MG/DL — SIGNIFICANT CHANGE UP (ref 8.5–10.1)
CALCIUM SERPL-MCNC: 9.8 MG/DL — SIGNIFICANT CHANGE UP (ref 8.5–10.1)
CHLORIDE SERPL-SCNC: 104 MMOL/L — SIGNIFICANT CHANGE UP (ref 96–108)
CHLORIDE SERPL-SCNC: 107 MMOL/L — SIGNIFICANT CHANGE UP (ref 96–108)
CHLORIDE SERPL-SCNC: 95 MMOL/L — LOW (ref 96–108)
CO2 SERPL-SCNC: 21 MMOL/L — LOW (ref 22–31)
CO2 SERPL-SCNC: 23 MMOL/L — SIGNIFICANT CHANGE UP (ref 22–31)
CO2 SERPL-SCNC: 26 MMOL/L — SIGNIFICANT CHANGE UP (ref 22–31)
COLOR SPEC: YELLOW — SIGNIFICANT CHANGE UP
CREAT SERPL-MCNC: 0.84 MG/DL — SIGNIFICANT CHANGE UP (ref 0.5–1.3)
CREAT SERPL-MCNC: 0.88 MG/DL — SIGNIFICANT CHANGE UP (ref 0.5–1.3)
CREAT SERPL-MCNC: 1.14 MG/DL — SIGNIFICANT CHANGE UP (ref 0.5–1.3)
DIFF PNL FLD: ABNORMAL
EOSINOPHIL # BLD AUTO: 0 K/UL — SIGNIFICANT CHANGE UP (ref 0–0.5)
EOSINOPHIL # BLD AUTO: 0 K/UL — SIGNIFICANT CHANGE UP (ref 0–0.5)
EOSINOPHIL NFR BLD AUTO: 0.2 % — SIGNIFICANT CHANGE UP (ref 0–6)
EOSINOPHIL NFR BLD AUTO: 0.5 % — SIGNIFICANT CHANGE UP (ref 0–6)
EPI CELLS # UR: SIGNIFICANT CHANGE UP
ETHANOL SERPL-MCNC: 118 MG/DL — HIGH (ref 0–10)
GLUCOSE SERPL-MCNC: 100 MG/DL — HIGH (ref 70–99)
GLUCOSE SERPL-MCNC: 102 MG/DL — HIGH (ref 70–99)
GLUCOSE SERPL-MCNC: 110 MG/DL — HIGH (ref 70–99)
GLUCOSE UR QL: NEGATIVE MG/DL — SIGNIFICANT CHANGE UP
HCO3 BLDA-SCNC: 20 MMOL/L — LOW (ref 21–29)
HCT VFR BLD CALC: 41.3 % — SIGNIFICANT CHANGE UP (ref 39–50)
HCT VFR BLD CALC: 51.8 % — HIGH (ref 39–50)
HGB BLD-MCNC: 13.5 G/DL — SIGNIFICANT CHANGE UP (ref 13–17)
HGB BLD-MCNC: 17.1 G/DL — HIGH (ref 13–17)
HOROWITZ INDEX BLDA+IHG-RTO: 21 — SIGNIFICANT CHANGE UP
HYALINE CASTS # UR AUTO: ABNORMAL /LPF
INR BLD: 1 RATIO — SIGNIFICANT CHANGE UP (ref 0.88–1.16)
KETONES UR-MCNC: ABNORMAL
LACTATE SERPL-SCNC: 1.1 MMOL/L — SIGNIFICANT CHANGE UP (ref 0.7–2)
LACTATE SERPL-SCNC: 3.3 MMOL/L — HIGH (ref 0.7–2)
LACTATE SERPL-SCNC: 9.6 MMOL/L — CRITICAL HIGH (ref 0.7–2)
LEUKOCYTE ESTERASE UR-ACNC: NEGATIVE — SIGNIFICANT CHANGE UP
LIDOCAIN IGE QN: 180 U/L — SIGNIFICANT CHANGE UP (ref 73–393)
LYMPHOCYTES # BLD AUTO: 1.4 K/UL — SIGNIFICANT CHANGE UP (ref 1–3.3)
LYMPHOCYTES # BLD AUTO: 1.5 K/UL — SIGNIFICANT CHANGE UP (ref 1–3.3)
LYMPHOCYTES # BLD AUTO: 18.4 % — SIGNIFICANT CHANGE UP (ref 13–44)
LYMPHOCYTES # BLD AUTO: 9.3 % — LOW (ref 13–44)
MCHC RBC-ENTMCNC: 28.7 PG — SIGNIFICANT CHANGE UP (ref 27–34)
MCHC RBC-ENTMCNC: 28.8 PG — SIGNIFICANT CHANGE UP (ref 27–34)
MCHC RBC-ENTMCNC: 32.7 GM/DL — SIGNIFICANT CHANGE UP (ref 32–36)
MCHC RBC-ENTMCNC: 33 GM/DL — SIGNIFICANT CHANGE UP (ref 32–36)
MCV RBC AUTO: 87.4 FL — SIGNIFICANT CHANGE UP (ref 80–100)
MCV RBC AUTO: 87.8 FL — SIGNIFICANT CHANGE UP (ref 80–100)
MONOCYTES # BLD AUTO: 0.5 K/UL — SIGNIFICANT CHANGE UP (ref 0–0.9)
MONOCYTES # BLD AUTO: 0.6 K/UL — SIGNIFICANT CHANGE UP (ref 0–0.9)
MONOCYTES NFR BLD AUTO: 3.3 % — SIGNIFICANT CHANGE UP (ref 2–14)
MONOCYTES NFR BLD AUTO: 7.5 % — SIGNIFICANT CHANGE UP (ref 2–14)
NEUTROPHILS # BLD AUTO: 12.7 K/UL — HIGH (ref 1.8–7.4)
NEUTROPHILS # BLD AUTO: 6 K/UL — SIGNIFICANT CHANGE UP (ref 1.8–7.4)
NEUTROPHILS NFR BLD AUTO: 73.4 % — SIGNIFICANT CHANGE UP (ref 43–77)
NEUTROPHILS NFR BLD AUTO: 86.3 % — HIGH (ref 43–77)
NITRITE UR-MCNC: NEGATIVE — SIGNIFICANT CHANGE UP
PCO2 BLDA: 30 MMHG — LOW (ref 32–46)
PH BLD: 7.44 — SIGNIFICANT CHANGE UP (ref 7.35–7.45)
PH UR: 5 — SIGNIFICANT CHANGE UP (ref 5–8)
PLATELET # BLD AUTO: 196 K/UL — SIGNIFICANT CHANGE UP (ref 150–400)
PLATELET # BLD AUTO: 301 K/UL — SIGNIFICANT CHANGE UP (ref 150–400)
PO2 BLDA: 103 MMHG — SIGNIFICANT CHANGE UP (ref 74–108)
POTASSIUM SERPL-MCNC: 3.7 MMOL/L — SIGNIFICANT CHANGE UP (ref 3.5–5.3)
POTASSIUM SERPL-MCNC: 4 MMOL/L — SIGNIFICANT CHANGE UP (ref 3.5–5.3)
POTASSIUM SERPL-MCNC: 4.3 MMOL/L — SIGNIFICANT CHANGE UP (ref 3.5–5.3)
POTASSIUM SERPL-SCNC: 3.7 MMOL/L — SIGNIFICANT CHANGE UP (ref 3.5–5.3)
POTASSIUM SERPL-SCNC: 4 MMOL/L — SIGNIFICANT CHANGE UP (ref 3.5–5.3)
POTASSIUM SERPL-SCNC: 4.3 MMOL/L — SIGNIFICANT CHANGE UP (ref 3.5–5.3)
PROT SERPL-MCNC: 9.2 GM/DL — HIGH (ref 6–8.3)
PROT UR-MCNC: 30 MG/DL
PROTHROM AB SERPL-ACNC: 10.9 SEC — SIGNIFICANT CHANGE UP (ref 9.8–12.7)
RBC # BLD: 4.7 M/UL — SIGNIFICANT CHANGE UP (ref 4.2–5.8)
RBC # BLD: 5.93 M/UL — HIGH (ref 4.2–5.8)
RBC # FLD: 11.8 % — SIGNIFICANT CHANGE UP (ref 11–15)
RBC # FLD: 12.1 % — SIGNIFICANT CHANGE UP (ref 11–15)
SAO2 % BLDA: 98 % — HIGH (ref 92–96)
SODIUM SERPL-SCNC: 140 MMOL/L — SIGNIFICANT CHANGE UP (ref 135–145)
SODIUM SERPL-SCNC: 142 MMOL/L — SIGNIFICANT CHANGE UP (ref 135–145)
SODIUM SERPL-SCNC: 142 MMOL/L — SIGNIFICANT CHANGE UP (ref 135–145)
SP GR SPEC: 1.02 — SIGNIFICANT CHANGE UP (ref 1.01–1.02)
UROBILINOGEN FLD QL: NEGATIVE MG/DL — SIGNIFICANT CHANGE UP
WBC # BLD: 14.7 K/UL — HIGH (ref 3.8–10.5)
WBC # BLD: 8.1 K/UL — SIGNIFICANT CHANGE UP (ref 3.8–10.5)
WBC # FLD AUTO: 14.7 K/UL — HIGH (ref 3.8–10.5)
WBC # FLD AUTO: 8.1 K/UL — SIGNIFICANT CHANGE UP (ref 3.8–10.5)
WBC UR QL: SIGNIFICANT CHANGE UP

## 2017-09-21 PROCEDURE — 74177 CT ABD & PELVIS W/CONTRAST: CPT | Mod: 26

## 2017-09-21 PROCEDURE — 99223 1ST HOSP IP/OBS HIGH 75: CPT

## 2017-09-21 RX ORDER — ONDANSETRON 8 MG/1
4 TABLET, FILM COATED ORAL ONCE
Qty: 0 | Refills: 0 | Status: COMPLETED | OUTPATIENT
Start: 2017-09-21 | End: 2017-09-21

## 2017-09-21 RX ORDER — SUCRALFATE 1 G
1 TABLET ORAL
Qty: 0 | Refills: 0 | Status: DISCONTINUED | OUTPATIENT
Start: 2017-09-21 | End: 2017-09-22

## 2017-09-21 RX ORDER — MORPHINE SULFATE 50 MG/1
2 CAPSULE, EXTENDED RELEASE ORAL ONCE
Qty: 0 | Refills: 0 | Status: DISCONTINUED | OUTPATIENT
Start: 2017-09-21 | End: 2017-09-21

## 2017-09-21 RX ORDER — SODIUM CHLORIDE 9 MG/ML
1000 INJECTION INTRAMUSCULAR; INTRAVENOUS; SUBCUTANEOUS ONCE
Qty: 0 | Refills: 0 | Status: COMPLETED | OUTPATIENT
Start: 2017-09-21 | End: 2017-09-21

## 2017-09-21 RX ORDER — MORPHINE SULFATE 50 MG/1
4 CAPSULE, EXTENDED RELEASE ORAL ONCE
Qty: 0 | Refills: 0 | Status: DISCONTINUED | OUTPATIENT
Start: 2017-09-21 | End: 2017-09-21

## 2017-09-21 RX ORDER — DEXTROSE MONOHYDRATE, SODIUM CHLORIDE, AND POTASSIUM CHLORIDE 50; .745; 4.5 G/1000ML; G/1000ML; G/1000ML
1000 INJECTION, SOLUTION INTRAVENOUS
Qty: 0 | Refills: 0 | Status: DISCONTINUED | OUTPATIENT
Start: 2017-09-21 | End: 2017-09-22

## 2017-09-21 RX ORDER — ONDANSETRON 8 MG/1
4 TABLET, FILM COATED ORAL EVERY 6 HOURS
Qty: 0 | Refills: 0 | Status: DISCONTINUED | OUTPATIENT
Start: 2017-09-21 | End: 2017-09-22

## 2017-09-21 RX ORDER — DIPHENHYDRAMINE HYDROCHLORIDE AND LIDOCAINE HYDROCHLORIDE AND ALUMINUM HYDROXIDE AND MAGNESIUM HYDRO
5 KIT
Qty: 0 | Refills: 0 | Status: DISCONTINUED | OUTPATIENT
Start: 2017-09-21 | End: 2017-09-22

## 2017-09-21 RX ADMIN — ONDANSETRON 4 MILLIGRAM(S): 8 TABLET, FILM COATED ORAL at 01:06

## 2017-09-21 RX ADMIN — Medication 30 MILLILITER(S): at 14:51

## 2017-09-21 RX ADMIN — MORPHINE SULFATE 2 MILLIGRAM(S): 50 CAPSULE, EXTENDED RELEASE ORAL at 08:04

## 2017-09-21 RX ADMIN — Medication 30 MILLILITER(S): at 01:06

## 2017-09-21 RX ADMIN — MORPHINE SULFATE 2 MILLIGRAM(S): 50 CAPSULE, EXTENDED RELEASE ORAL at 02:59

## 2017-09-21 RX ADMIN — MORPHINE SULFATE 2 MILLIGRAM(S): 50 CAPSULE, EXTENDED RELEASE ORAL at 02:30

## 2017-09-21 RX ADMIN — MORPHINE SULFATE 2 MILLIGRAM(S): 50 CAPSULE, EXTENDED RELEASE ORAL at 20:54

## 2017-09-21 RX ADMIN — PANTOPRAZOLE SODIUM 10 MG/HR: 20 TABLET, DELAYED RELEASE ORAL at 10:07

## 2017-09-21 RX ADMIN — MORPHINE SULFATE 4 MILLIGRAM(S): 50 CAPSULE, EXTENDED RELEASE ORAL at 01:51

## 2017-09-21 RX ADMIN — MORPHINE SULFATE 4 MILLIGRAM(S): 50 CAPSULE, EXTENDED RELEASE ORAL at 01:06

## 2017-09-21 RX ADMIN — MORPHINE SULFATE 2 MILLIGRAM(S): 50 CAPSULE, EXTENDED RELEASE ORAL at 11:25

## 2017-09-21 RX ADMIN — PANTOPRAZOLE SODIUM 10 MG/HR: 20 TABLET, DELAYED RELEASE ORAL at 20:56

## 2017-09-21 RX ADMIN — SODIUM CHLORIDE 500 MILLILITER(S): 9 INJECTION INTRAMUSCULAR; INTRAVENOUS; SUBCUTANEOUS at 06:38

## 2017-09-21 RX ADMIN — DEXTROSE MONOHYDRATE, SODIUM CHLORIDE, AND POTASSIUM CHLORIDE 100 MILLILITER(S): 50; .745; 4.5 INJECTION, SOLUTION INTRAVENOUS at 14:51

## 2017-09-21 RX ADMIN — PANTOPRAZOLE SODIUM 10 MG/HR: 20 TABLET, DELAYED RELEASE ORAL at 00:46

## 2017-09-21 RX ADMIN — MORPHINE SULFATE 2 MILLIGRAM(S): 50 CAPSULE, EXTENDED RELEASE ORAL at 11:10

## 2017-09-21 RX ADMIN — MORPHINE SULFATE 2 MILLIGRAM(S): 50 CAPSULE, EXTENDED RELEASE ORAL at 22:32

## 2017-09-21 NOTE — ED ADULT NURSE NOTE - CHIEF COMPLAINT QUOTE
Pt is here for vomiting blood. Pt states the vomiting started around 1700 today and he has been admitted to [facility] for the same problem.

## 2017-09-21 NOTE — H&P ADULT - NSHPREVIEWOFSYSTEMS_GEN_ALL_CORE
REVIEW OF SYSTEMS:  CONSTITUTIONAL: No fever, weight loss, or fatigue  EYES: No eye pain, visual disturbances, or discharge  ENMT:  No difficulty hearing, tinnitus, vertigo; No sinus or throat pain  NECK: No pain or stiffness  RESPIRATORY: No cough, wheezing, chills or hemoptysis; No shortness of breath  CARDIOVASCULAR: No chest pain, palpitations, dizziness, or leg swelling  GASTROINTESTINAL: +epigastric pain. + nausea, vomiting, + hematemesis; No diarrhea or constipation. No melena or hematochezia.  GENITOURINARY: No dysuria, frequency, hematuria, or incontinence  NEUROLOGICAL: No headaches, memory loss, loss of strength, numbness, or tremors  SKIN: No itching, burning, rashes, or lesions   LYMPH NODES: No enlarged glands  ENDOCRINE: No heat or cold intolerance; No hair loss  MUSCULOSKELETAL: No joint pain or swelling; No muscle, back, or extremity pain  PSYCHIATRIC: No depression, anxiety, mood swings, or difficulty sleeping  HEME/LYMPH: No easy bruising, or bleeding gums  ALLERGY AND IMMUNOLOGIC: No hives or eczema

## 2017-09-21 NOTE — CONSULT NOTE ADULT - SUBJECTIVE AND OBJECTIVE BOX
Chief Complaint:  Patient is a 52y old  Male who presents with a chief complaint of abdominal pain nausea and vomiting     HPI:52 year old man with PMH gerd and HLD presents with complaint of upper abdominal pain, nausea, and several episodes of vomiting streaked with blood.  He states that he has had similar pain with nausea for many months.  He drinks whiskey socially, about once or twice per week.  admitted in 2017 and  He was seen in [facility] same (but less severe) complaint and was discharged with outpatient GI follow up.     Now readmitted with progressive pain and similar pain     Allergies:  No Known Allergies      Medications:  pantoprazole Infusion 8 mG/Hr IV Continuous <Continuous>  ondansetron Injectable 4 milliGRAM(s) IV Push every 6 hours PRN  dextrose 5% + sodium chloride 0.9% with potassium chloride 20 mEq/L 1000 milliLiter(s) IV Continuous <Continuous>    Home Medications:   * Patient Currently Takes Medications as of 16-Aug-2017 03:43 documented in Prescription Writer  · 	atorvastatin 20 mg oral tablet: 1 tab(s) orally once a day, Last Dose Taken:    · 	Pepcid 40 mg oral tablet: 1 tab(s) orally once a day (at bedtime), Last Dose Taken:    PMHX/PSHX:  Mixed hyperlipidemia  PUD (peptic ulcer disease)  Alcohol abuse  No significant past surgical history      Family history:  No pertinent family history in first degree relatives      Social History: (+) ETOH whisky, (-) TOB     ROS:     General:  No wt loss, fevers, chills, night sweats, fatigue,   Eyes:  Good vision, no reported pain  ENT:  No sore throat, pain, runny nose, dysphagia  CV:  No pain, palpitations, hypo/hypertension  Resp:  No dyspnea, cough, tachypnea, wheezing  GI:  +pain, +nausea, +vomiting, No diarrhea, No constipation, No weight loss, No fever, No pruritis, No rectal bleeding, No tarry stools, No dysphagia,  :  No pain, bleeding, incontinence, nocturia  Muscle:  No pain, weakness  Breast:  No pain, abscess, mass, discharge  Neuro:  No weakness, tingling, memory problems  Psych:  No fatigue, insomnia, mood problems, depression  Endocrine:  No polyuria, polydipsia, cold/heat intolerance  Heme:  No petechiae, ecchymosis, easy bruisability  Skin:  No rash, tattoos, scars, edema      PHYSICAL EXAM:   Vital Signs:  Vital Signs Last 24 Hrs  T(C): 37.1 (21 Sep 2017 17:52), Max: 37.1 (21 Sep 2017 02:29)  T(F): 98.8 (21 Sep 2017 17:52), Max: 98.8 (21 Sep 2017 09:30)  HR: 64 (21 Sep 2017 17:52) (64 - 144)  BP: 144/86 (21 Sep 2017 17:52) (122/82 - 152/84)  BP(mean): --  RR: 17 (21 Sep 2017 17:52) (16 - 23)  SpO2: 98% (21 Sep 2017 17:52) (98% - 100%)  Daily Height in cm: 170.18 (20 Sep 2017 21:52)    Daily     GENERAL:  Appears stated age, well-groomed, well-nourished, no distress  HEENT:  NC/AT,  conjunctivae clear and pink, no thyromegaly, nodules, adenopathy, no JVD, sclera -anicteric  CHEST:  Full & symmetric excursion, no increased effort, breath sounds clear  HEART:  Regular rhythm, S1, S2, no murmur/rub/S3/S4, no abdominal bruit, no edema  ABDOMEN:  Soft, +epi -tender, +-distended, normoactive bowel sounds,  no masses , no hepatosplenomegaly, no signs of chronic liver disease  EXTREMITIES:  no cyanosis,clubbing or edema  SKIN:  No rash/erythema/ecchymoses/petechiae/wounds/abscess/warm/dry  NEURO:  Alert, oriented, no asterixis, no tremor, no encephalopathy    LABS:                        13.5   8.1   )-----------( 196      ( 21 Sep 2017 12:00 )             41.3         142  |  107  |  14  ----------------------------<  110<H>  4.0   |  26  |  0.84    Ca    8.4<L>      21 Sep 2017 12:00    TPro  9.2<H>  /  Alb  4.5  /  TBili  0.8  /  DBili  x   /  AST  46<H>  /  ALT  43  /  AlkPhos  54      LIVER FUNCTIONS - ( 21 Sep 2017 00:14 )  Alb: 4.5 g/dL / Pro: 9.2 gm/dL / ALK PHOS: 54 U/L / ALT: 43 U/L / AST: 46 U/L / GGT: x           PT/INR - ( 21 Sep 2017 00:14 )   PT: 10.9 sec;   INR: 1.00 ratio         PTT - ( 21 Sep 2017 00:14 )  PTT:25.4 sec  Urinalysis Basic - ( 21 Sep 2017 04:16 )    Color: Yellow / Appearance: Clear / S.020 / pH: x  Gluc: x / Ketone: Large  / Bili: Negative / Urobili: Negative mg/dL   Blood: x / Protein: 30 mg/dL / Nitrite: Negative   Leuk Esterase: Negative / RBC: x / WBC 0-2   Sq Epi: x / Non Sq Epi: x / Bacteria: Few    Lipase [id number]       Imaging:  < from: CT Abdomen and Pelvis w/ IV Cont (17 @ 02:21) >  EXAM:  CT ABDOMEN AND PELVIS IC                            PROCEDURE DATE:  2017          INTERPRETATION:  CT ABDOMEN AND PELVIS WITH CONTRAST    INDICATION: Hematemesis.    TECHNIQUE: Contrast enhanced CT of the abdomen and pelvis.     90 mLof Omnipaque 350 contrast material was injected IV.    COMPARISON: CT 2017.    FINDINGS:    Lower Thorax: No consolidation or effusion.        Liver: No suspicious lesions. Hepatic steatosis.  Biliary: No dilatation.      Spleen: No suspicious lesions.      Pancreas: No inflammatory changes or ductal dilatation.      Adrenals: Normal.      Kidneys: No hydronephrosis or solid mass.      Vessels: Normal caliber.        GI tract: No evidence of small bowel obstruction. No wall thickening or   inflammatory changes.        Peritoneum/retroperitoneum and mesentery: No free air. No organized fluid   collection. No adenopathy.        Pelvic organs/Bladder: No pelvic masses. Bladder is normal.        Abdominal wall: Unremarkable.  Bones and soft tissues: No destructive lesion.        IMPRESSION:    Hepatic steatosis. No acute findings.    HEMAL PEREZ M.D., ATTENDING RADIOLOGIST  This document has been electronically signed. Sep 21 2017  2:17AM

## 2017-09-21 NOTE — H&P ADULT - HISTORY OF PRESENT ILLNESS
53 y/o Man w PMH of HL, PUD & ulcerative esophagitis presents to ED c/o hematemesis.  Pt admitted to ED for same last month.  Pt admits he has been non compliant with meds & denies EtOH intake.  Pt states abd pain started 3d ago, then today vomiting started.  Denies fever, chills, syncope, diarrhea.  Pt tachy to 144 in triage. Pt started on protonix drip by ED physician, was typed and screened and given IV fluids. No fever/chills, No photophobia/eye pain/changes in vision, No ear pain/sore throat/dysphagia, No chest pain, no SOB/cough/wheeze/stridor, +abdominal pain, No neck/back pain, no rash, no changes in neurological status/function.

## 2017-09-21 NOTE — H&P ADULT - NSHPPHYSICALEXAM_GEN_ALL_CORE
T(C): 36.7 (21 Sep 2017 04:46), Max: 37.1 (21 Sep 2017 02:29)  T(F): 98 (21 Sep 2017 04:46), Max: 98.7 (21 Sep 2017 02:29)  HR: 85 (21 Sep 2017 04:21) (85 - 144)  BP: 136/79 (21 Sep 2017 04:21) (122/82 - 152/84)  BP(mean): --  RR: 18 (21 Sep 2017 04:21) (16 - 23)  SpO2: 99% (21 Sep 2017 04:21) (98% - 99%)    PHYSICAL EXAM:  GENERAL: NAD, well-groomed, well-developed  HEAD:  Atraumatic, Normocephalic  EYES: EOMI, PERRLA, conjunctiva and sclera clear  ENMT: No tonsillar erythema, exudates, or enlargement; Moist mucous membranes, No lesions  NECK: Supple, No JVD, Normal thyroid  NERVOUS SYSTEM:  Alert & Oriented X3, Good concentration; Motor Strength 5/5 B/L upper and lower extremities; DTRs 2+ intact and symmetric  CHEST/LUNG: Clear to percussion bilaterally; No rales, rhonchi, wheezing, or rubs  HEART: Regular rate and rhythm; No murmurs, rubs, or gallops  ABDOMEN: Soft, epigastric tenderness, no guarding or rebound, Nondistended; Bowel sounds present  EXTREMITIES:  2+ Peripheral Pulses, No clubbing, cyanosis, or edema  LYMPH: No lymphadenopathy noted  SKIN: No rashes or lesions T(C): 36.7 (21 Sep 2017 04:46), Max: 37.1 (21 Sep 2017 02:29)  T(F): 98 (21 Sep 2017 04:46), Max: 98.7 (21 Sep 2017 02:29)  HR: 85 (21 Sep 2017 04:21) (85 - 144)  BP: 136/79 (21 Sep 2017 04:21) (122/82 - 152/84)  BP(mean): --  RR: 18 (21 Sep 2017 04:21) (16 - 23)  SpO2: 99% (21 Sep 2017 04:21) (98% - 99%)    PHYSICAL EXAM:  GENERAL: NAD, well-groomed, well-developed  HEAD:  Atraumatic, Normocephalic  EYES: EOMI, PERRLA, conjunctiva and sclera clear  ENMT: No tonsillar erythema, exudates, or enlargement; dry mucous membranes, No lesions  NECK: Supple, No JVD, Normal thyroid  NERVOUS SYSTEM:  Alert & Oriented X3, Good concentration; Motor Strength 5/5 B/L upper and lower extremities; DTRs 2+ intact and symmetric  CHEST/LUNG: Clear to percussion bilaterally; No rales, rhonchi, wheezing, or rubs  HEART: Regular rate and rhythm; No murmurs, rubs, or gallops  ABDOMEN: Soft, epigastric tenderness, no guarding or rebound, Nondistended; Bowel sounds present  EXTREMITIES:  2+ Peripheral Pulses, No clubbing, cyanosis, or edema  LYMPH: No lymphadenopathy noted  SKIN: No rashes or lesions

## 2017-09-21 NOTE — H&P ADULT - PROBLEM SELECTOR PLAN 1
due to PUD, no history varices,  2/2 non compliance with medications  NPO, PPI drip, avoid anticoagulants/antiplatelet agents, serial VS, H/H, GI consult

## 2017-09-21 NOTE — H&P ADULT - NSHPLABSRESULTS_GEN_ALL_CORE
LABS:                        17.1   14.7  )-----------( 301      ( 21 Sep 2017 00:14 )             51.8         142  |  104  |  14  ----------------------------<  102<H>  4.3   |  23  |  0.88    Ca    8.7      21 Sep 2017 04:50    TPro  9.2<H>  /  Alb  4.5  /  TBili  0.8  /  DBili  x   /  AST  46<H>  /  ALT  43  /  AlkPhos  54      PT/INR - ( 21 Sep 2017 00:14 )   PT: 10.9 sec;   INR: 1.00 ratio         PTT - ( 21 Sep 2017 00:14 )  PTT:25.4 sec  Lactate, Blood: 9.6: mmol/L (17 @ 00:14)  Lactate, Blood: 3.3 mmol/L (17 @ 04:50)  Blood Gas Profile - Arterial (17 @ 01:56)    Blood Gas Source: Arterial    Blood Gas Comments: site held until bleeding stopped. no hematoma noted.    pH, Blood: 7.44    pCO2, Arterial: 30 mmHg    pO2, Arterial: 103 mmHg    HCO3, Arterial: 20 mmol/L    Base Excess, Arterial: -2.8 mmol/L    Oxygen Saturation, Arterial: 98 %    FIO2, Arterial: 21.0  Lipase, Serum: 180 U/L (17 @ 01:56)  Alcohol, Blood: 118: mg/dL (17 @ 01:02)      Urinalysis Basic - ( 21 Sep 2017 04:16 )    Color: Yellow / Appearance: Clear / S.020 / pH: x  Gluc: x / Ketone: Large  / Bili: Negative / Urobili: Negative mg/dL   Blood: x / Protein: 30 mg/dL / Nitrite: Negative   Leuk Esterase: Negative / RBC: x / WBC 0-2   Sq Epi: x / Non Sq Epi: x / Bacteria: Few    RADIOLOGY & ADDITIONAL TESTS:  CT Abdomen and Pelvis w/ IV Cont (17 @ 02:21) >  Hepatic steatosis. No acute findings.   CT Angio Abdomen and Pelvis w/ IV Cont (17 @ 01:12) >  BOWEL: No bowel obstruction. Appendix normal. Diverticuli of the cecum.   No evidence for active bowel inflammation. No evidence for active GI   Xray Chest 1 View AP/PA. (08.15.17 @ 21:59) >  The lungs and pleura are clear. The cardiac and mediastinal contours are   within normal limits. The bony structures are intact.    Imaging Personally Reviewed:  [x ] YES  [ ] NO  EKG: refused

## 2017-09-21 NOTE — H&P ADULT - ASSESSMENT
53 y/o man with multiple episodes of hematemesis and hospital admissions for same, hospitalized here last month, EGD done, found to have erosive esophagitis and PUD. He has lactic acidosis which greatly improved with IV fluids. He denies ETOH consumption  but has elevated alcohol level, he admits to poor compliance with PUD medication.  Pt to be admitted to telemetry, IV PPI, NPO, follow VS and H/H and transfuse as necessary, serial lactate levels, needs counseling about alcohol abuse and medication compliance.

## 2017-09-22 ENCOUNTER — TRANSCRIPTION ENCOUNTER (OUTPATIENT)
Age: 52
End: 2017-09-22

## 2017-09-22 VITALS
SYSTOLIC BLOOD PRESSURE: 131 MMHG | HEART RATE: 62 BPM | RESPIRATION RATE: 18 BRPM | OXYGEN SATURATION: 99 % | TEMPERATURE: 98 F | DIASTOLIC BLOOD PRESSURE: 84 MMHG

## 2017-09-22 LAB
CULTURE RESULTS: NO GROWTH — SIGNIFICANT CHANGE UP
SPECIMEN SOURCE: SIGNIFICANT CHANGE UP

## 2017-09-22 PROCEDURE — 99239 HOSP IP/OBS DSCHRG MGMT >30: CPT

## 2017-09-22 RX ORDER — PANTOPRAZOLE SODIUM 20 MG/1
1 TABLET, DELAYED RELEASE ORAL
Qty: 30 | Refills: 0 | OUTPATIENT
Start: 2017-09-22 | End: 2017-10-22

## 2017-09-22 RX ORDER — SUCRALFATE 1 G
10 TABLET ORAL
Qty: 1200 | Refills: 0
Start: 2017-09-22 | End: 2017-10-22

## 2017-09-22 RX ORDER — PANTOPRAZOLE SODIUM 20 MG/1
40 TABLET, DELAYED RELEASE ORAL
Qty: 0 | Refills: 0 | Status: DISCONTINUED | OUTPATIENT
Start: 2017-09-22 | End: 2017-09-22

## 2017-09-22 RX ADMIN — Medication 1 GRAM(S): at 05:27

## 2017-09-22 RX ADMIN — DIPHENHYDRAMINE HYDROCHLORIDE AND LIDOCAINE HYDROCHLORIDE AND ALUMINUM HYDROXIDE AND MAGNESIUM HYDRO 5 MILLILITER(S): KIT at 11:35

## 2017-09-22 RX ADMIN — Medication 1 GRAM(S): at 17:37

## 2017-09-22 RX ADMIN — DEXTROSE MONOHYDRATE, SODIUM CHLORIDE, AND POTASSIUM CHLORIDE 100 MILLILITER(S): 50; .745; 4.5 INJECTION, SOLUTION INTRAVENOUS at 05:28

## 2017-09-22 RX ADMIN — DIPHENHYDRAMINE HYDROCHLORIDE AND LIDOCAINE HYDROCHLORIDE AND ALUMINUM HYDROXIDE AND MAGNESIUM HYDRO 5 MILLILITER(S): KIT at 00:05

## 2017-09-22 RX ADMIN — Medication 1 GRAM(S): at 11:35

## 2017-09-22 RX ADMIN — DIPHENHYDRAMINE HYDROCHLORIDE AND LIDOCAINE HYDROCHLORIDE AND ALUMINUM HYDROXIDE AND MAGNESIUM HYDRO 5 MILLILITER(S): KIT at 05:27

## 2017-09-22 RX ADMIN — Medication 1 GRAM(S): at 00:04

## 2017-09-22 NOTE — CHART NOTE - NSCHARTNOTEFT_GEN_A_CORE
Stable on 2-D. Had lunch and dinner with no further GI bleeding. Discharge to home. Add Protonix and Carafate. Agrees to see GI next week for EGD. See discharge note.

## 2017-09-22 NOTE — PROGRESS NOTE ADULT - PROBLEM SELECTOR PLAN 2
ETOH abstinence strongly advocated. ETOH abstinence strongly advocated.  CIWA monitoring  MVI, Thiamine daily

## 2017-09-22 NOTE — DISCHARGE NOTE ADULT - PATIENT PORTAL LINK FT
“You can access the [employer] Patient Portal, offered by [facility], by registering with the following website: [email or web address]”

## 2017-09-22 NOTE — DISCHARGE NOTE ADULT - MEDICATION SUMMARY - MEDICATIONS TO TAKE
I will START or STAY ON the medications listed below when I get home from the hospital:    atorvastatin 20 mg oral tablet  -- 1 tab(s) by mouth once a day  -- Indication: For Mixed hyperlipidemia    sucralfate 1 g/10 mL oral suspension  -- 10 milliliter(s) by mouth 4 times a day  -- Indication: For PUD (peptic ulcer disease)    pantoprazole 40 mg oral delayed release tablet  -- 1 tab(s) by mouth once a day  -- It is very important that you take or use this exactly as directed.  Do not skip doses or discontinue unless directed by your doctor.  Obtain medical advice before taking any non-prescription drugs as some may affect the action of this medication.  Swallow whole.  Do not crush.    -- Indication: For PUD (peptic ulcer disease)

## 2017-09-22 NOTE — DISCHARGE NOTE ADULT - PLAN OF CARE
Resolved F/u With GI for outpatient EGD  ETOH abstinence  PPI, carafate Cont. Home medication and follow up with PMD See above. F/u with GI as outpatient for EGD.

## 2017-09-22 NOTE — PROGRESS NOTE ADULT - PROBLEM SELECTOR PLAN 3
continue PPI, CARAFATE, MAGIC MOUTHWASH(LIDO, BENEDRYL, MAALOX). continue PPI, CARAFATE, MAGIC MOUTHWASH(LIDO, BENEDRYL, MAALOX).  Clear liquid diet continue PPI, CARAFATE, MAGIC MOUTHWASH(LIDO, BENEDRYL, MAALOX).  Clear liquid diet advance as tolerated

## 2017-09-22 NOTE — DISCHARGE NOTE ADULT - CARE PROVIDER_API CALL
Jarrod Rossi), Medicine  [address]  [address] [place] [address]  Phone: [phone]  Fax: [phone]

## 2017-09-22 NOTE — DISCHARGE NOTE ADULT - CARE PROVIDERS DIRECT ADDRESSES
[email or web address]

## 2017-09-22 NOTE — DISCHARGE NOTE ADULT - HOSPITAL COURSE
53 y/o man with multiple episodes of hematemesis and hospital admissions for same, hospitalized here last month, EGD done, found to have erosive esophagitis and PUD. He has lactic acidosis which greatly improved with IV fluids. He denies ETOH consumption  but has elevated alcohol level, he admits to poor compliance with PUD medication.  Pt admitted to telemetry, IV PPI, NPO, follow VS and H/H and transfuse as necessary, serial lactate levels, needs continued counseling about alcohol abuse and medication compliance. Followed by Gastroenterologist, was placed on clear liquids diet was advanced as tolerated. CT scan of abdomen showed hepatic steatosis, no acute findings. Patient on PPI, carafate and magic mouthwash. Pt. no longer with complaints of pain of nausea/vomiting and tolerating diet. Hemodynamically stable for discharge home to follow up with GI as outpatient.

## 2017-09-22 NOTE — DISCHARGE NOTE ADULT - CARE PLAN
Principal Discharge DX:	Abdominal pain  Goal:	Resolved  Instructions for follow-up, activity and diet:	F/u With GI for outpatient EGD  ETOH abstinence  PPI, carafate  Secondary Diagnosis:	Hematemesis with nausea  Instructions for follow-up, activity and diet:	F/u With GI for outpatient EGD  ETOH abstinence  PPI, carafate  Secondary Diagnosis:	Mixed hyperlipidemia  Instructions for follow-up, activity and diet:	Cont. Home medication and follow up with PMD  Secondary Diagnosis:	PUD (peptic ulcer disease)  Instructions for follow-up, activity and diet:	See above. F/u with GI as outpatient for EGD.

## 2017-09-22 NOTE — PROGRESS NOTE ADULT - SUBJECTIVE AND OBJECTIVE BOX
Gastroenterolgy  Patient seen and examined bedside resting comfortably  c/o epigastric abdominal pain  Denies nausea and vomiting. NPO  Normal flatus/BM.     T(F): 97.6 (09-22-17 @ 11:47), Max: 98.8 (09-21-17 @ 17:52)  HR: 54 (09-22-17 @ 11:47) (54 - 64)  BP: 145/89 (09-22-17 @ 11:47) (112/69 - 145/89)  RR: 18 (09-22-17 @ 11:47) (17 - 18)  SpO2: 98% (09-22-17 @ 11:47) (98% - 98%)  Wt(kg): --  CAPILLARY BLOOD GLUCOSE        PHYSICAL EXAM:  General: NAD, WDWN.   Neuro:  Alert & oriented x 3  HEENT: NCAT, EOMI, conjunctiva clear  CV: +S1+S2 regular rate and rhythm  Lung: clear to ausculation bilaterally, respirations nonlabored, good inspiratory effort  Abdomen: soft, + epigastric Tender, No distention Normal active BS  Extremities: no cyanosis, clubbing or edema    LABS:                        13.5   8.1   )-----------( 196      ( 21 Sep 2017 12:00 )             41.3     09-21    142  |  107  |  14  ----------------------------<  110<H>  4.0   |  26  |  0.84    Ca    8.4<L>      21 Sep 2017 12:00    TPro  9.2<H>  /  Alb  4.5  /  TBili  0.8  /  DBili  x   /  AST  46<H>  /  ALT  43  /  AlkPhos  54  09-21    LIVER FUNCTIONS - ( 21 Sep 2017 00:14 )  Alb: 4.5 g/dL / Pro: 9.2 gm/dL / ALK PHOS: 54 U/L / ALT: 43 U/L / AST: 46 U/L / GGT: x           PT/INR - ( 21 Sep 2017 00:14 )   PT: 10.9 sec;   INR: 1.00 ratio         PTT - ( 21 Sep 2017 00:14 )  PTT:25.4 sec  I&O's Detail    09-21 @ 12:00    142 | 107 | 14  /8.4 | -- | --  _______________________/  4.0 | 26 | 0.84                           \par   Lipase, Serum: 180 U/L (09-21 @ 01:56) Gastroenterolgy  Patient seen and examined bedside resting comfortably  c/o epigastric abdominal pain  Denies nausea and vomiting. NPO  Normal flatus/BM.     T(F): 97.6 (09-22-17 @ 11:47), Max: 98.8 (09-21-17 @ 17:52)  HR: 54 (09-22-17 @ 11:47) (54 - 64)  BP: 145/89 (09-22-17 @ 11:47) (112/69 - 145/89)  RR: 18 (09-22-17 @ 11:47) (17 - 18)  SpO2: 98% (09-22-17 @ 11:47) (98% - 98%)  Wt(kg): --  CAPILLARY BLOOD GLUCOSE        PHYSICAL EXAM:  General: NAD, WDWN.   Neuro:  Alert & oriented x 3  HEENT: NCAT, EOMI, conjunctiva clear  CV: +S1+S2 regular rate and rhythm  Lung: clear to ausculation bilaterally, respirations nonlabored, good inspiratory effort  Abdomen: soft, + epigastric Tender, No distention Normal active BS  Extremities: no cyanosis, clubbing or edema    LABS:                        13.5   8.1   )-----------( 196      ( 21 Sep 2017 12:00 )             41.3     09-21    142  |  107  |  14  ----------------------------<  110<H>  4.0   |  26  |  0.84    Ca    8.4<L>      21 Sep 2017 12:00    TPro  9.2<H>  /  Alb  4.5  /  TBili  0.8  /  DBili  x   /  AST  46<H>  /  ALT  43  /  AlkPhos  54  09-21    LIVER FUNCTIONS - ( 21 Sep 2017 00:14 )  Alb: 4.5 g/dL / Pro: 9.2 gm/dL / ALK PHOS: 54 U/L / ALT: 43 U/L / AST: 46 U/L / GGT: x           PT/INR - ( 21 Sep 2017 00:14 )   PT: 10.9 sec;   INR: 1.00 ratio         PTT - ( 21 Sep 2017 00:14 )  PTT:25.4 sec  I&O's Detail    09-21 @ 12:00    142 | 107 | 14  /8.4 | -- | --  _______________________/  4.0 | 26 | 0.84                           \par   Lipase, Serum: 180 U/L (09-21 @ 01:56)      Alcohol, Blood (09.21.17 @ 01:02)    Alcohol, Blood: 118: TOXIC CONCENTRATIONS (mg/dL):  Flushing, Slowing of  Reflexes, Impaired Visual Acuity:     Depression of CNS:    > 100  Fatalities Reported:       > 400  Results reported as a "< number" are below reliably detectable limits and  considered [id number]: vinay  These ranges are intended as general guidelines.  Alcohol metabolism can vary widely among individuals.  This test  is approved for clinical and not for forensic purposes. mg/dL

## 2017-09-26 DIAGNOSIS — R10.13 EPIGASTRIC PAIN: ICD-10-CM

## 2017-09-26 DIAGNOSIS — E87.2 ACIDOSIS: ICD-10-CM

## 2017-09-26 DIAGNOSIS — K27.9 PEPTIC ULCER, SITE UNSPECIFIED, UNSPECIFIED AS ACUTE OR CHRONIC, WITHOUT HEMORRHAGE OR PERFORATION: ICD-10-CM

## 2017-09-26 DIAGNOSIS — Z28.21 IMMUNIZATION NOT CARRIED OUT BECAUSE OF PATIENT REFUSAL: ICD-10-CM

## 2017-09-26 DIAGNOSIS — K21.9 GASTRO-ESOPHAGEAL REFLUX DISEASE WITHOUT ESOPHAGITIS: ICD-10-CM

## 2017-09-26 DIAGNOSIS — K92.0 HEMATEMESIS: ICD-10-CM

## 2017-09-26 DIAGNOSIS — K22.10 ULCER OF ESOPHAGUS WITHOUT BLEEDING: ICD-10-CM

## 2017-09-26 DIAGNOSIS — E78.2 MIXED HYPERLIPIDEMIA: ICD-10-CM

## 2017-09-26 DIAGNOSIS — F10.10 ALCOHOL ABUSE, UNCOMPLICATED: ICD-10-CM

## 2017-09-26 DIAGNOSIS — K76.0 FATTY (CHANGE OF) LIVER, NOT ELSEWHERE CLASSIFIED: ICD-10-CM

## 2017-11-06 ENCOUNTER — EMERGENCY (EMERGENCY)
Facility: HOSPITAL | Age: 52
LOS: 0 days | Discharge: HOME | End: 2017-11-06
Attending: EMERGENCY MEDICINE
Payer: MEDICAID

## 2017-11-06 VITALS
TEMPERATURE: 98 F | OXYGEN SATURATION: 97 % | WEIGHT: 160.06 LBS | HEART RATE: 124 BPM | SYSTOLIC BLOOD PRESSURE: 145 MMHG | RESPIRATION RATE: 16 BRPM | DIASTOLIC BLOOD PRESSURE: 88 MMHG | HEIGHT: 67 IN

## 2017-11-06 VITALS
TEMPERATURE: 99 F | SYSTOLIC BLOOD PRESSURE: 130 MMHG | RESPIRATION RATE: 17 BRPM | DIASTOLIC BLOOD PRESSURE: 69 MMHG | OXYGEN SATURATION: 97 % | HEART RATE: 99 BPM

## 2017-11-06 LAB
ALBUMIN SERPL ELPH-MCNC: 4.3 G/DL — SIGNIFICANT CHANGE UP (ref 3.3–5)
ALP SERPL-CCNC: 53 U/L — SIGNIFICANT CHANGE UP (ref 40–120)
ALT FLD-CCNC: 60 U/L — SIGNIFICANT CHANGE UP (ref 12–78)
ANION GAP SERPL CALC-SCNC: 10 MMOL/L — SIGNIFICANT CHANGE UP (ref 5–17)
AST SERPL-CCNC: 53 U/L — HIGH (ref 15–37)
BILIRUB SERPL-MCNC: 1.3 MG/DL — HIGH (ref 0.2–1.2)
BUN SERPL-MCNC: 12 MG/DL — SIGNIFICANT CHANGE UP (ref 7–23)
CALCIUM SERPL-MCNC: 9.7 MG/DL — SIGNIFICANT CHANGE UP (ref 8.5–10.1)
CHLORIDE SERPL-SCNC: 95 MMOL/L — LOW (ref 96–108)
CO2 SERPL-SCNC: 30 MMOL/L — SIGNIFICANT CHANGE UP (ref 22–31)
CREAT SERPL-MCNC: 1.17 MG/DL — SIGNIFICANT CHANGE UP (ref 0.5–1.3)
ETHANOL SERPL-MCNC: <10 MG/DL — SIGNIFICANT CHANGE UP (ref 0–10)
GLUCOSE SERPL-MCNC: 181 MG/DL — HIGH (ref 70–99)
HCT VFR BLD CALC: 46.4 % — SIGNIFICANT CHANGE UP (ref 39–50)
HGB BLD-MCNC: 15.4 G/DL — SIGNIFICANT CHANGE UP (ref 13–17)
LIDOCAIN IGE QN: 269 U/L — SIGNIFICANT CHANGE UP (ref 73–393)
MCHC RBC-ENTMCNC: 28.1 PG — SIGNIFICANT CHANGE UP (ref 27–34)
MCHC RBC-ENTMCNC: 33.3 GM/DL — SIGNIFICANT CHANGE UP (ref 32–36)
MCV RBC AUTO: 84.4 FL — SIGNIFICANT CHANGE UP (ref 80–100)
PLATELET # BLD AUTO: 253 K/UL — SIGNIFICANT CHANGE UP (ref 150–400)
POTASSIUM SERPL-MCNC: 3.1 MMOL/L — LOW (ref 3.5–5.3)
POTASSIUM SERPL-SCNC: 3.1 MMOL/L — LOW (ref 3.5–5.3)
PROT SERPL-MCNC: 9 GM/DL — HIGH (ref 6–8.3)
RBC # BLD: 5.5 M/UL — SIGNIFICANT CHANGE UP (ref 4.2–5.8)
RBC # FLD: 11.6 % — SIGNIFICANT CHANGE UP (ref 11–15)
SODIUM SERPL-SCNC: 135 MMOL/L — SIGNIFICANT CHANGE UP (ref 135–145)
TROPONIN I SERPL-MCNC: <.015 NG/ML — SIGNIFICANT CHANGE UP (ref 0.01–0.04)
WBC # BLD: 7.7 K/UL — SIGNIFICANT CHANGE UP (ref 3.8–10.5)
WBC # FLD AUTO: 7.7 K/UL — SIGNIFICANT CHANGE UP (ref 3.8–10.5)

## 2017-11-06 PROCEDURE — 99284 EMERGENCY DEPT VISIT MOD MDM: CPT

## 2017-11-06 RX ORDER — SODIUM CHLORIDE 9 MG/ML
1000 INJECTION INTRAMUSCULAR; INTRAVENOUS; SUBCUTANEOUS ONCE
Qty: 0 | Refills: 0 | Status: COMPLETED | OUTPATIENT
Start: 2017-11-06 | End: 2017-11-06

## 2017-11-06 RX ORDER — ONDANSETRON 8 MG/1
4 TABLET, FILM COATED ORAL ONCE
Qty: 0 | Refills: 0 | Status: COMPLETED | OUTPATIENT
Start: 2017-11-06 | End: 2017-11-06

## 2017-11-06 RX ORDER — PANTOPRAZOLE SODIUM 20 MG/1
8 TABLET, DELAYED RELEASE ORAL
Qty: 80 | Refills: 0 | Status: DISCONTINUED | OUTPATIENT
Start: 2017-11-06 | End: 2017-11-06

## 2017-11-06 RX ORDER — SODIUM CHLORIDE 9 MG/ML
1000 INJECTION, SOLUTION INTRAVENOUS
Qty: 0 | Refills: 0 | Status: DISCONTINUED | OUTPATIENT
Start: 2017-11-06 | End: 2017-11-06

## 2017-11-06 RX ORDER — FAMOTIDINE 10 MG/ML
20 INJECTION INTRAVENOUS ONCE
Qty: 0 | Refills: 0 | Status: COMPLETED | OUTPATIENT
Start: 2017-11-06 | End: 2017-11-06

## 2017-11-06 RX ORDER — FOLIC ACID 0.8 MG
1 TABLET ORAL ONCE
Qty: 0 | Refills: 0 | Status: COMPLETED | OUTPATIENT
Start: 2017-11-06 | End: 2017-11-06

## 2017-11-06 RX ORDER — ACETAMINOPHEN 500 MG
975 TABLET ORAL ONCE
Qty: 0 | Refills: 0 | Status: COMPLETED | OUTPATIENT
Start: 2017-11-06 | End: 2017-11-06

## 2017-11-06 RX ORDER — SODIUM CHLORIDE 9 MG/ML
1000 INJECTION, SOLUTION INTRAVENOUS
Qty: 0 | Refills: 0 | Status: COMPLETED | OUTPATIENT
Start: 2017-11-06 | End: 2017-11-06

## 2017-11-06 RX ADMIN — SODIUM CHLORIDE 1000 MILLILITER(S): 9 INJECTION INTRAMUSCULAR; INTRAVENOUS; SUBCUTANEOUS at 14:22

## 2017-11-06 RX ADMIN — PANTOPRAZOLE SODIUM 10 MG/HR: 20 TABLET, DELAYED RELEASE ORAL at 14:55

## 2017-11-06 RX ADMIN — Medication 975 MILLIGRAM(S): at 16:14

## 2017-11-06 RX ADMIN — FAMOTIDINE 20 MILLIGRAM(S): 10 INJECTION INTRAVENOUS at 14:24

## 2017-11-06 RX ADMIN — Medication 975 MILLIGRAM(S): at 15:47

## 2017-11-06 RX ADMIN — ONDANSETRON 4 MILLIGRAM(S): 8 TABLET, FILM COATED ORAL at 14:22

## 2017-11-06 RX ADMIN — Medication 1 MILLIGRAM(S): at 14:25

## 2017-11-06 RX ADMIN — SODIUM CHLORIDE 150 MILLILITER(S): 9 INJECTION, SOLUTION INTRAVENOUS at 14:48

## 2017-11-06 NOTE — ED PROVIDER NOTE - OBJECTIVE STATEMENT
51 yo M with abd pain, epigastric in nature, started after n/v for the last few days.  Pt. reports red blood mixed with vomitus since last night.  No other complaints, currently.  Pt. denies having followed up with GI, he admits he was supposed to have EGD, but the number he was given didn't work for f/u appointment.  No other complaints.   ROS: negative for fever, cough, headache, chest pain, shortness of breath, diarrhea, rash, paresthesia, and weakness.   PMH: HL, PUD & ulcerative esophagitis, frequent visits for hematemesis in ED; Meds: non-compliant; SH: hx alcoholism

## 2017-11-06 NOTE — ED PROVIDER NOTE - MEDICAL DECISION MAKING DETAILS
51 yo M with epigastric pain, pancreatitis vs alcoholic gastritis, likely Jacque Henning tears  -protonix, pepcid, reglan, ekg  -basic labs, lipase, trop  -f/u results, reeval, will consider GI consult

## 2017-11-06 NOTE — ED PROVIDER NOTE - PHYSICAL EXAMINATION
Vitals: tachy 124, otherwise WNL  Gen: AAOx3, NAD, sitting comfortably in stretcher  Head: ncat, perrla, eomi b/l  Neck: supple, no lymphadenopathy, no midline deviation  Heart: rrr, no m/r/g  Lungs: CTA b/l, no rales/ronchi/wheezes  Abd: soft, tender in epigastrium, non-distended, no rebound or guarding  Ext: no clubbing/cyanosis/edema  Neuro: sensation and muscle strength intact b/l, steady gait

## 2017-11-06 NOTE — ED ADULT NURSE NOTE - OBJECTIVE STATEMENT
Received pt lying on stretcher c/p pain to the epigastric area radiating to the left back x3 days ,nausea vomiting x several episode last night  last drink 2 days ago, pt also c/o generalozed weakness

## 2017-11-06 NOTE — ED PROVIDER NOTE - INTERPRETATION
normal sinus rhythm, Normal axis, Normal NH interval and QRS complex. There are no acute ischemic ST or T-wave changes./EKG performed in ED, NSR at 82, sinus arrhythmia, No acute ischemic changes, normal intervals

## 2017-11-06 NOTE — ED PROVIDER NOTE - PROGRESS NOTE DETAILS
Results reported to patient--grossly benign  Pt. reports feeling better after meds  pt. agrees to f/u with GI outpt.; pt. agrees to f/u this time to get scoped   pt. understands to return to ED if symptoms worsen; will d/c

## 2017-11-07 DIAGNOSIS — K27.9 PEPTIC ULCER, SITE UNSPECIFIED, UNSPECIFIED AS ACUTE OR CHRONIC, WITHOUT HEMORRHAGE OR PERFORATION: ICD-10-CM

## 2017-11-07 DIAGNOSIS — R10.13 EPIGASTRIC PAIN: ICD-10-CM

## 2017-11-07 DIAGNOSIS — E78.2 MIXED HYPERLIPIDEMIA: ICD-10-CM

## 2017-11-07 DIAGNOSIS — R11.2 NAUSEA WITH VOMITING, UNSPECIFIED: ICD-10-CM

## 2017-12-23 ENCOUNTER — INPATIENT (INPATIENT)
Facility: HOSPITAL | Age: 52
LOS: 0 days | Discharge: LEFT BEFORE DISCHARGE | End: 2017-12-24
Attending: HOSPITALIST | Admitting: HOSPITALIST
Payer: MEDICAID

## 2017-12-23 VITALS
HEIGHT: 67 IN | RESPIRATION RATE: 16 BRPM | WEIGHT: 175.93 LBS | TEMPERATURE: 99 F | DIASTOLIC BLOOD PRESSURE: 97 MMHG | HEART RATE: 116 BPM | OXYGEN SATURATION: 100 % | SYSTOLIC BLOOD PRESSURE: 156 MMHG

## 2017-12-23 LAB
ALBUMIN SERPL ELPH-MCNC: 4.4 G/DL — SIGNIFICANT CHANGE UP (ref 3.3–5)
ALP SERPL-CCNC: 57 U/L — SIGNIFICANT CHANGE UP (ref 40–120)
ALT FLD-CCNC: 69 U/L — SIGNIFICANT CHANGE UP (ref 12–78)
ANION GAP SERPL CALC-SCNC: 18 MMOL/L — HIGH (ref 5–17)
APTT BLD: 26.7 SEC — LOW (ref 27.5–37.4)
AST SERPL-CCNC: 78 U/L — HIGH (ref 15–37)
BASOPHILS # BLD AUTO: 0.1 K/UL — SIGNIFICANT CHANGE UP (ref 0–0.2)
BASOPHILS NFR BLD AUTO: 1.2 % — SIGNIFICANT CHANGE UP (ref 0–2)
BILIRUB SERPL-MCNC: 1 MG/DL — SIGNIFICANT CHANGE UP (ref 0.2–1.2)
BUN SERPL-MCNC: 14 MG/DL — SIGNIFICANT CHANGE UP (ref 7–23)
CALCIUM SERPL-MCNC: 9.3 MG/DL — SIGNIFICANT CHANGE UP (ref 8.5–10.1)
CHLORIDE SERPL-SCNC: 96 MMOL/L — SIGNIFICANT CHANGE UP (ref 96–108)
CK MB BLD-MCNC: 0.3 % — SIGNIFICANT CHANGE UP (ref 0–3.5)
CK MB CFR SERPL CALC: 0.9 NG/ML — SIGNIFICANT CHANGE UP (ref 0.5–3.6)
CK SERPL-CCNC: 341 U/L — HIGH (ref 26–308)
CO2 SERPL-SCNC: 24 MMOL/L — SIGNIFICANT CHANGE UP (ref 22–31)
CREAT SERPL-MCNC: 1.02 MG/DL — SIGNIFICANT CHANGE UP (ref 0.5–1.3)
EOSINOPHIL # BLD AUTO: 0 K/UL — SIGNIFICANT CHANGE UP (ref 0–0.5)
EOSINOPHIL NFR BLD AUTO: 0.3 % — SIGNIFICANT CHANGE UP (ref 0–6)
GLUCOSE SERPL-MCNC: 96 MG/DL — SIGNIFICANT CHANGE UP (ref 70–99)
HCT VFR BLD CALC: 47.4 % — SIGNIFICANT CHANGE UP (ref 39–50)
HGB BLD-MCNC: 15.8 G/DL — SIGNIFICANT CHANGE UP (ref 13–17)
INR BLD: 1.04 RATIO — SIGNIFICANT CHANGE UP (ref 0.88–1.16)
LACTATE SERPL-SCNC: 2.8 MMOL/L — HIGH (ref 0.7–2)
LACTATE SERPL-SCNC: 5.2 MMOL/L — CRITICAL HIGH (ref 0.7–2)
LIDOCAIN IGE QN: 380 U/L — SIGNIFICANT CHANGE UP (ref 73–393)
LYMPHOCYTES # BLD AUTO: 1.4 K/UL — SIGNIFICANT CHANGE UP (ref 1–3.3)
LYMPHOCYTES # BLD AUTO: 25.1 % — SIGNIFICANT CHANGE UP (ref 13–44)
MCHC RBC-ENTMCNC: 28.5 PG — SIGNIFICANT CHANGE UP (ref 27–34)
MCHC RBC-ENTMCNC: 33.3 GM/DL — SIGNIFICANT CHANGE UP (ref 32–36)
MCV RBC AUTO: 85.6 FL — SIGNIFICANT CHANGE UP (ref 80–100)
MONOCYTES # BLD AUTO: 0.4 K/UL — SIGNIFICANT CHANGE UP (ref 0–0.9)
MONOCYTES NFR BLD AUTO: 6.8 % — SIGNIFICANT CHANGE UP (ref 2–14)
NEUTROPHILS # BLD AUTO: 3.6 K/UL — SIGNIFICANT CHANGE UP (ref 1.8–7.4)
NEUTROPHILS NFR BLD AUTO: 66.6 % — SIGNIFICANT CHANGE UP (ref 43–77)
PLATELET # BLD AUTO: 206 K/UL — SIGNIFICANT CHANGE UP (ref 150–400)
POTASSIUM SERPL-MCNC: 3.5 MMOL/L — SIGNIFICANT CHANGE UP (ref 3.5–5.3)
POTASSIUM SERPL-SCNC: 3.5 MMOL/L — SIGNIFICANT CHANGE UP (ref 3.5–5.3)
PROT SERPL-MCNC: 9 GM/DL — HIGH (ref 6–8.3)
PROTHROM AB SERPL-ACNC: 11.4 SEC — SIGNIFICANT CHANGE UP (ref 9.8–12.7)
RBC # BLD: 5.54 M/UL — SIGNIFICANT CHANGE UP (ref 4.2–5.8)
RBC # FLD: 12.4 % — SIGNIFICANT CHANGE UP (ref 11–15)
SODIUM SERPL-SCNC: 138 MMOL/L — SIGNIFICANT CHANGE UP (ref 135–145)
TROPONIN I SERPL-MCNC: <.015 NG/ML — SIGNIFICANT CHANGE UP (ref 0.01–0.04)
WBC # BLD: 5.5 K/UL — SIGNIFICANT CHANGE UP (ref 3.8–10.5)
WBC # FLD AUTO: 5.5 K/UL — SIGNIFICANT CHANGE UP (ref 3.8–10.5)

## 2017-12-23 PROCEDURE — 99285 EMERGENCY DEPT VISIT HI MDM: CPT

## 2017-12-23 PROCEDURE — 74177 CT ABD & PELVIS W/CONTRAST: CPT | Mod: 26

## 2017-12-23 RX ORDER — PANTOPRAZOLE SODIUM 20 MG/1
40 TABLET, DELAYED RELEASE ORAL ONCE
Qty: 0 | Refills: 0 | Status: COMPLETED | OUTPATIENT
Start: 2017-12-23 | End: 2017-12-23

## 2017-12-23 RX ORDER — SODIUM CHLORIDE 9 MG/ML
2000 INJECTION INTRAMUSCULAR; INTRAVENOUS; SUBCUTANEOUS ONCE
Qty: 0 | Refills: 0 | Status: COMPLETED | OUTPATIENT
Start: 2017-12-23 | End: 2017-12-23

## 2017-12-23 RX ORDER — FAMOTIDINE 10 MG/ML
20 INJECTION INTRAVENOUS ONCE
Qty: 0 | Refills: 0 | Status: COMPLETED | OUTPATIENT
Start: 2017-12-23 | End: 2017-12-23

## 2017-12-23 RX ORDER — MORPHINE SULFATE 50 MG/1
2 CAPSULE, EXTENDED RELEASE ORAL ONCE
Qty: 0 | Refills: 0 | Status: DISCONTINUED | OUTPATIENT
Start: 2017-12-23 | End: 2017-12-23

## 2017-12-23 RX ORDER — MORPHINE SULFATE 50 MG/1
4 CAPSULE, EXTENDED RELEASE ORAL ONCE
Qty: 0 | Refills: 0 | Status: DISCONTINUED | OUTPATIENT
Start: 2017-12-23 | End: 2017-12-23

## 2017-12-23 RX ORDER — METOCLOPRAMIDE HCL 10 MG
10 TABLET ORAL ONCE
Qty: 0 | Refills: 0 | Status: COMPLETED | OUTPATIENT
Start: 2017-12-23 | End: 2017-12-23

## 2017-12-23 RX ORDER — ONDANSETRON 8 MG/1
8 TABLET, FILM COATED ORAL ONCE
Qty: 0 | Refills: 0 | Status: COMPLETED | OUTPATIENT
Start: 2017-12-23 | End: 2017-12-23

## 2017-12-23 RX ADMIN — Medication 10 MILLIGRAM(S): at 19:55

## 2017-12-23 RX ADMIN — MORPHINE SULFATE 4 MILLIGRAM(S): 50 CAPSULE, EXTENDED RELEASE ORAL at 18:43

## 2017-12-23 RX ADMIN — ONDANSETRON 8 MILLIGRAM(S): 8 TABLET, FILM COATED ORAL at 18:12

## 2017-12-23 RX ADMIN — SODIUM CHLORIDE 1000 MILLILITER(S): 9 INJECTION INTRAMUSCULAR; INTRAVENOUS; SUBCUTANEOUS at 23:22

## 2017-12-23 RX ADMIN — MORPHINE SULFATE 2 MILLIGRAM(S): 50 CAPSULE, EXTENDED RELEASE ORAL at 23:22

## 2017-12-23 RX ADMIN — SODIUM CHLORIDE 2000 MILLILITER(S): 9 INJECTION INTRAMUSCULAR; INTRAVENOUS; SUBCUTANEOUS at 18:51

## 2017-12-23 RX ADMIN — FAMOTIDINE 20 MILLIGRAM(S): 10 INJECTION INTRAVENOUS at 18:12

## 2017-12-23 RX ADMIN — Medication 1 MILLIGRAM(S): at 18:51

## 2017-12-23 RX ADMIN — PANTOPRAZOLE SODIUM 40 MILLIGRAM(S): 20 TABLET, DELAYED RELEASE ORAL at 23:22

## 2017-12-23 RX ADMIN — MORPHINE SULFATE 4 MILLIGRAM(S): 50 CAPSULE, EXTENDED RELEASE ORAL at 21:05

## 2017-12-23 RX ADMIN — MORPHINE SULFATE 4 MILLIGRAM(S): 50 CAPSULE, EXTENDED RELEASE ORAL at 19:57

## 2017-12-23 RX ADMIN — MORPHINE SULFATE 4 MILLIGRAM(S): 50 CAPSULE, EXTENDED RELEASE ORAL at 19:55

## 2017-12-23 NOTE — ED PROVIDER NOTE - CONSTITUTIONAL, MLM
normal... Well appearing, well nourished, awake, alert, oriented to person, place, time/situation and moderate distress secondary to pain

## 2017-12-23 NOTE — ED PROVIDER NOTE - OBJECTIVE STATEMENT
52 year old male with PMH of alcohol abuse, HLD, PUD presenting due to epigastric discomfort, pain, otherwise no fever/chills noted. States had nausea/vomiting, last drink was yesterday.

## 2017-12-24 VITALS
HEART RATE: 79 BPM | SYSTOLIC BLOOD PRESSURE: 118 MMHG | TEMPERATURE: 98 F | OXYGEN SATURATION: 97 % | DIASTOLIC BLOOD PRESSURE: 82 MMHG

## 2017-12-24 DIAGNOSIS — E51.9 THIAMINE DEFICIENCY, UNSPECIFIED: ICD-10-CM

## 2017-12-24 DIAGNOSIS — K20.9 ESOPHAGITIS, UNSPECIFIED: ICD-10-CM

## 2017-12-24 DIAGNOSIS — K29.21 ALCOHOLIC GASTRITIS WITH BLEEDING: ICD-10-CM

## 2017-12-24 DIAGNOSIS — E78.2 MIXED HYPERLIPIDEMIA: ICD-10-CM

## 2017-12-24 DIAGNOSIS — Z29.9 ENCOUNTER FOR PROPHYLACTIC MEASURES, UNSPECIFIED: ICD-10-CM

## 2017-12-24 DIAGNOSIS — K27.9 PEPTIC ULCER, SITE UNSPECIFIED, UNSPECIFIED AS ACUTE OR CHRONIC, WITHOUT HEMORRHAGE OR PERFORATION: ICD-10-CM

## 2017-12-24 DIAGNOSIS — F10.10 ALCOHOL ABUSE, UNCOMPLICATED: ICD-10-CM

## 2017-12-24 LAB
ALBUMIN SERPL ELPH-MCNC: 3.3 G/DL — SIGNIFICANT CHANGE UP (ref 3.3–5)
ALP SERPL-CCNC: 43 U/L — SIGNIFICANT CHANGE UP (ref 40–120)
ALT FLD-CCNC: 47 U/L — SIGNIFICANT CHANGE UP (ref 12–78)
ANION GAP SERPL CALC-SCNC: 14 MMOL/L — SIGNIFICANT CHANGE UP (ref 5–17)
AST SERPL-CCNC: 48 U/L — HIGH (ref 15–37)
BILIRUB DIRECT SERPL-MCNC: 0.21 MG/DL — HIGH (ref 0.05–0.2)
BILIRUB INDIRECT FLD-MCNC: 0.8 MG/DL — SIGNIFICANT CHANGE UP (ref 0.2–1)
BILIRUB SERPL-MCNC: 1 MG/DL — SIGNIFICANT CHANGE UP (ref 0.2–1.2)
BLD GP AB SCN SERPL QL: SIGNIFICANT CHANGE UP
BUN SERPL-MCNC: 13 MG/DL — SIGNIFICANT CHANGE UP (ref 7–23)
CALCIUM SERPL-MCNC: 7.3 MG/DL — LOW (ref 8.5–10.1)
CHLORIDE SERPL-SCNC: 108 MMOL/L — SIGNIFICANT CHANGE UP (ref 96–108)
CO2 SERPL-SCNC: 21 MMOL/L — LOW (ref 22–31)
CREAT SERPL-MCNC: 0.72 MG/DL — SIGNIFICANT CHANGE UP (ref 0.5–1.3)
GLUCOSE SERPL-MCNC: 76 MG/DL — SIGNIFICANT CHANGE UP (ref 70–99)
HCT VFR BLD CALC: 38.6 % — LOW (ref 39–50)
HCT VFR BLD CALC: 38.7 % — LOW (ref 39–50)
HGB BLD-MCNC: 13 G/DL — SIGNIFICANT CHANGE UP (ref 13–17)
HGB BLD-MCNC: 13 G/DL — SIGNIFICANT CHANGE UP (ref 13–17)
LACTATE SERPL-SCNC: 1 MMOL/L — SIGNIFICANT CHANGE UP (ref 0.7–2)
MAGNESIUM SERPL-MCNC: 1.8 MG/DL — SIGNIFICANT CHANGE UP (ref 1.6–2.6)
MCHC RBC-ENTMCNC: 28.8 PG — SIGNIFICANT CHANGE UP (ref 27–34)
MCHC RBC-ENTMCNC: 29.6 PG — SIGNIFICANT CHANGE UP (ref 27–34)
MCHC RBC-ENTMCNC: 33.7 GM/DL — SIGNIFICANT CHANGE UP (ref 32–36)
MCHC RBC-ENTMCNC: 33.8 GM/DL — SIGNIFICANT CHANGE UP (ref 32–36)
MCV RBC AUTO: 85.3 FL — SIGNIFICANT CHANGE UP (ref 80–100)
MCV RBC AUTO: 87.6 FL — SIGNIFICANT CHANGE UP (ref 80–100)
PHOSPHATE SERPL-MCNC: 2.5 MG/DL — SIGNIFICANT CHANGE UP (ref 2.5–4.5)
PLATELET # BLD AUTO: 116 K/UL — LOW (ref 150–400)
PLATELET # BLD AUTO: 130 K/UL — LOW (ref 150–400)
POTASSIUM SERPL-MCNC: 3.5 MMOL/L — SIGNIFICANT CHANGE UP (ref 3.5–5.3)
POTASSIUM SERPL-SCNC: 3.5 MMOL/L — SIGNIFICANT CHANGE UP (ref 3.5–5.3)
PROT SERPL-MCNC: 6.6 GM/DL — SIGNIFICANT CHANGE UP (ref 6–8.3)
RBC # BLD: 4.4 M/UL — SIGNIFICANT CHANGE UP (ref 4.2–5.8)
RBC # BLD: 4.54 M/UL — SIGNIFICANT CHANGE UP (ref 4.2–5.8)
RBC # FLD: 12.4 % — SIGNIFICANT CHANGE UP (ref 11–15)
RBC # FLD: 12.7 % — SIGNIFICANT CHANGE UP (ref 11–15)
SODIUM SERPL-SCNC: 143 MMOL/L — SIGNIFICANT CHANGE UP (ref 135–145)
WBC # BLD: 5.1 K/UL — SIGNIFICANT CHANGE UP (ref 3.8–10.5)
WBC # BLD: 5.2 K/UL — SIGNIFICANT CHANGE UP (ref 3.8–10.5)
WBC # FLD AUTO: 5.1 K/UL — SIGNIFICANT CHANGE UP (ref 3.8–10.5)
WBC # FLD AUTO: 5.2 K/UL — SIGNIFICANT CHANGE UP (ref 3.8–10.5)

## 2017-12-24 PROCEDURE — 99223 1ST HOSP IP/OBS HIGH 75: CPT | Mod: AI

## 2017-12-24 RX ORDER — FAMOTIDINE 10 MG/ML
20 INJECTION INTRAVENOUS
Qty: 0 | Refills: 0 | Status: DISCONTINUED | OUTPATIENT
Start: 2017-12-24 | End: 2017-12-24

## 2017-12-24 RX ORDER — THIAMINE MONONITRATE (VIT B1) 100 MG
100 TABLET ORAL DAILY
Qty: 0 | Refills: 0 | Status: DISCONTINUED | OUTPATIENT
Start: 2017-12-24 | End: 2017-12-24

## 2017-12-24 RX ORDER — PANTOPRAZOLE SODIUM 20 MG/1
40 TABLET, DELAYED RELEASE ORAL
Qty: 0 | Refills: 0 | Status: DISCONTINUED | OUTPATIENT
Start: 2017-12-24 | End: 2017-12-24

## 2017-12-24 RX ORDER — SODIUM CHLORIDE 9 MG/ML
1000 INJECTION, SOLUTION INTRAVENOUS
Qty: 0 | Refills: 0 | Status: COMPLETED | OUTPATIENT
Start: 2017-12-24 | End: 2017-12-24

## 2017-12-24 RX ORDER — FOLIC ACID 0.8 MG
1 TABLET ORAL DAILY
Qty: 0 | Refills: 0 | Status: DISCONTINUED | OUTPATIENT
Start: 2017-12-24 | End: 2017-12-24

## 2017-12-24 RX ADMIN — SODIUM CHLORIDE 200 MILLILITER(S): 9 INJECTION, SOLUTION INTRAVENOUS at 08:37

## 2017-12-24 RX ADMIN — MORPHINE SULFATE 2 MILLIGRAM(S): 50 CAPSULE, EXTENDED RELEASE ORAL at 00:48

## 2017-12-24 RX ADMIN — Medication 100 MILLIGRAM(S): at 11:31

## 2017-12-24 RX ADMIN — Medication 1 MILLIGRAM(S): at 00:37

## 2017-12-24 RX ADMIN — FAMOTIDINE 20 MILLIGRAM(S): 10 INJECTION INTRAVENOUS at 02:07

## 2017-12-24 RX ADMIN — PANTOPRAZOLE SODIUM 40 MILLIGRAM(S): 20 TABLET, DELAYED RELEASE ORAL at 06:43

## 2017-12-24 RX ADMIN — Medication 1 TABLET(S): at 11:31

## 2017-12-24 RX ADMIN — Medication 1 MILLIGRAM(S): at 11:31

## 2017-12-24 NOTE — DISCHARGE NOTE ADULT - HOSPITAL COURSE
51 y/o admitted with alcohol gastritis/esophagitis wants to leave AMA. Pt seen by GI and hospitalist team.  Risks of leaving AMA d/w pt.

## 2017-12-24 NOTE — H&P ADULT - ASSESSMENT
52 year old male with PMH gerd, HLD, etoh abuse  presents with complaint of upper abdominal pain, hematemesis and melena

## 2017-12-24 NOTE — H&P ADULT - NSHPPHYSICALEXAM_GEN_ALL_CORE
Vital Signs Last 24 Hrs  T(C): 37.2 (24 Dec 2017 00:46), Max: 37.3 (23 Dec 2017 16:55)  T(F): 99 (24 Dec 2017 00:46), Max: 99.2 (23 Dec 2017 16:55)  HR: 83 (24 Dec 2017 00:46) (83 - 116)  BP: 129/72 (24 Dec 2017 00:46) (110/65 - 156/97)  BP(mean): --  RR: 12 (24 Dec 2017 00:46) (12 - 16)  SpO2: 95% (24 Dec 2017 00:46) (95% - 100%)    PHYSICAL EXAM:    GENERAL: NAD, well-groomed, well-developed  HEAD:  Atraumatic, Normocephalic  EYES: EOMI, PERRLA, conjunctiva and sclera clear  ENMT: No tonsillar erythema, exudates, or enlargement; Moist mucous membranes, No lesions  NECK: Supple, No JVD, Normal thyroid  NERVOUS SYSTEM:  Alert & Oriented X3, Good concentration; Motor Strength 5/5 B/L upper and lower extremities; DTRs 2+ intact and symmetric  CHEST/LUNG: Clear to percussion bilaterally; No rales, rhonchi, wheezing, or rubs  HEART: Regular rate and rhythm; No rubs, or gallops, +S1,S2  ABDOMEN: Soft, Nontender, Nondistended; Bowel sounds present  EXTREMITIES:  2+ Peripheral Pulses, No clubbing, cyanosis, or edema  LYMPH: No cervical adenopathy  RECTAL: deferred  BREAST: No palpatble masses, skin no lesions   : deferred  SKIN: No rashes or lesions    IMPROVE VTE Individual Risk Assessment        RISK                                                          Points  [  ] Previous VTE                                                3  [  ] Thrombophilia                                             2  [  ] Lower limb paralysis                                    2        (unable to hold up >15 seconds)    [  ] Current Cancer                                             2         (within 6 months)  [  ] Immobilization > 24 hrs                              1  [  ] ICU/CCU stay > 24 hours                            1  [  ] Age > 60                                                    1  IMPROVE VTE Score ________0_

## 2017-12-24 NOTE — H&P ADULT - NSHPLABSRESULTS_GEN_ALL_CORE
LABS:                        15.8   5.5   )-----------( 206      ( 23 Dec 2017 18:12 )             47.4     12-23    138  |  96  |  14  ----------------------------<  96  3.5   |  24  |  1.02    Ca    9.3      23 Dec 2017 18:12    TPro  9.0<H>  /  Alb  4.4  /  TBili  1.0  /  DBili  x   /  AST  78<H>  /  ALT  69  /  AlkPhos  57  12-23    PT/INR - ( 23 Dec 2017 20:48 )   PT: 11.4 sec;   INR: 1.04 ratio         PTT - ( 23 Dec 2017 20:48 )  PTT:26.7 sec    CAPILLARY BLOOD GLUCOSE          RADIOLOGY & ADDITIONAL TESTS:    Imaging Personally Reviewed:  [ X] YES  [ ] NO

## 2017-12-24 NOTE — CHART NOTE - NSCHARTNOTEFT_GEN_A_CORE
53 y/o M with ETOH abuse presents with Melena stool, presumed UGIB with ABLA. GI consult pending, IV protonix, CLD, Pt is on CIWA protocol. 53 y/o M with ETOH abuse presents with hematemesis, Melena stool, presumed UGIB with ABLA. GI consult pending, IV protonix, CLD, Pt is on CIWA protocol. currently AAOX3, non-tremulous

## 2017-12-24 NOTE — DISCHARGE NOTE ADULT - PATIENT PORTAL LINK FT
“You can access the [facility] Patient Portal, offered by [facility], by registering with the following website: [email or web address]”

## 2017-12-24 NOTE — CONSULT NOTE ADULT - SUBJECTIVE AND OBJECTIVE BOX
Chief Complaint:  Patient is a 52y old  Male who presents with a chief complaint of hematemesis (24 Dec 2017 02:09)      HPI:  Pt. is a 52 year old male with PMH gerd, HLD, etoh abuse  presents with complaint of upper abdominal pain, nausea, and several episodes of emesis w/blood over the last 2 days and also reports dark stools x1 day.  he has had multiple admissions at [facility] and [facility] for similar sx's.  pt states he has been drinking less and usually not on daily basis but over the last 5 days heavy use because was getting work done at home and was off.  States wasnt able to eat so thought with alcohol maybe able to eat more easily was having miexed w/OJ.  pt denies any lightheadedness, dizziness, fever,chill, sob, cp, or palpitatations.  currently not taking any meds. (24 Dec 2017 02:09)      PMH/PSH:PAST MEDICAL & SURGICAL HISTORY:  Mixed hyperlipidemia  PUD (peptic ulcer disease)  No significant past surgical history      Allergies:  No Known Allergies      Medications:  famotidine Injectable 20 milliGRAM(s) IV Push two times a day  folic acid 1 milliGRAM(s) Oral daily  LORazepam   Injectable 2 milliGRAM(s) IV Push every 1 hour PRN  multivitamin 1 Tablet(s) Oral daily  pantoprazole  Injectable 40 milliGRAM(s) IV Push two times a day  thiamine 100 milliGRAM(s) Oral daily      Review of Systems:    General:  No weight loss, fevers, chills, night sweats, fatigue,   Eyes:  Good vision, no reported pain  ENT:  No sore throat, pain, runny nose, dysphagia  CV:  No pain, palpitations, hypo/hypertension  Resp:  No dyspnea, cough, tachypnea, wheezing  GI:  + pain, + nausea, + vomiting, No diarrhea, No constipation, No pruritis, No rectal bleeding, No tarry stools, No dysphagia,  :  No pain, bleeding, incontinence, nocturia  Muscle:  No pain, weakness  Breast:  No pain, abscess, mass, discharge  Neuro:  No weakness, tingling, memory problems  Psych:  No fatigue, insomnia, mood problems, depression  Endocrine:  No polyuria, polydypsia, cold/heat intolerance  Heme:  No petechiae, ecchymosis, easy bruisability  Skin:  No rash, tattoos, scars, edema    Relevant Family History:   FAMILY HISTORY:  No pertinent family history in first degree relatives      Relevant Social History:  + ETOH Abuse    Physical Exam:    Vital Signs:  Vital Signs Last 24 Hrs  T(C): 36.4 (24 Dec 2017 11:22), Max: 37.3 (23 Dec 2017 16:55)  T(F): 97.6 (24 Dec 2017 11:22), Max: 99.2 (23 Dec 2017 16:55)  HR: 79 (24 Dec 2017 11:22) (75 - 753)  BP: 118/82 (24 Dec 2017 11:22) (110/65 - 156/97)  BP(mean): --  RR: 18 (24 Dec 2017 05:16) (12 - 18)  SpO2: 97% (24 Dec 2017 11:22) (95% - 100%)  Daily Height in cm: 170.18 (23 Dec 2017 16:55)    Daily Weight in k.9 (24 Dec 2017 05:16)    General:  Appears stated age, well-groomed, well-nourished, no distress  HEENT:  NC/AT,  conjunctivae clear and pink, no thyromegaly, nodules, adenopathy, no JVD  Chest:  Full & symmetric excursion, no increased effort, breath sounds clear  Cardiovascular:  Regular rhythm, S1, S2, no murmur/rub/S3/S4, no abdominal bruit, no edema  Abdomen:  Soft, non-tender, non-distended, normoactive bowel sounds,  no masses , no hepatosplenomegaly, no signs of chronic liver disease  Extremities:  no cyanosis, clubbing or edema  Skin:  No rash/erythema/ecchymoses/petechiae/wounds/abscess/warm/dry  Neuro/Psych:  Alert, oriented, no asterixis, no tremor, no encephalopathy    Laboratory:                          13.0   5.2   )-----------( 130      ( 24 Dec 2017 04:05 )             38.7     12-24    143  |  108  |  13  ----------------------------<  76  3.5   |  21<L>  |  0.72    Ca    7.3<L>      24 Dec 2017 04:05  Phos  2.5     12-24  Mg     1.8     12-24    TPro  6.6  /  Alb  3.3  /  TBili  1.0  /  DBili  .21<H>  /  AST  48<H>  /  ALT  47  /  AlkPhos  43  12-24    LIVER FUNCTIONS - ( 24 Dec 2017 04:05 )  Alb: 3.3 g/dL / Pro: 6.6 gm/dL / ALK PHOS: 43 U/L / ALT: 47 U/L / AST: 48 U/L / GGT: x           PT/INR - ( 23 Dec 2017 20:48 )   PT: 11.4 sec;   INR: 1.04 ratio         PTT - ( 23 Dec 2017 20:48 )  PTT:26.7 sec      Lipase [id number] U/L       Imaging:    < from: CT Abdomen and Pelvis w/ IV Cont (17 @ 20:14) >    EXAM:  CT ABDOMEN AND PELVIS IC                            PROCEDURE DATE:  2017          INTERPRETATION:  CLINICAL INFORMATION: Epigastric abdominal pain,   vomiting.    PROCEDURE:   Helical axial images were obtained from the domes of the diaphragm   through the pubic symphysis following the administration of intravenous   contrast. 90 mls of Omnipaque-350 administered without complication. 10   mls discarded. Coronal and sagittal reformats were also obtained.       COMPARISON: 2017.    FINDINGS:    LOWER CHEST: Minimal dependent atelectasis.    LIVER: Again noted, marked fatty infiltration.  GALLBLADDER/BILE DUCTS: No intrahepatic or extrahepatic biliary   dilatation. No radiopaque gallstone.  PANCREAS: Again noted, focal fat in the uncinate process.  SPLEEN: Unremarkable.    ADRENALS: Unremarkable.  KIDNEYS/URETERS: No hydronephrosis, hydroureter or perinephric stranding.   BLADDER: Unremarkable.    REPRODUCTIVE ORGANS: Unremarkable.    BOWEL: Mild wall thickening of the visualized distal esophagus. No bowel   obstruction. Unremarkable appendix. Underdistended stomach, limiting   evaluation.     PERITONEUM: No drainable fluid collection or free air.  VESSELS: Atherosclerotic change of the abdominal aorta and its branches.   RETROPERITONEUM: No lymphadenopathy.    ABDOMINAL WALL/SOFT TISSUES: Unremarkable.  BONES: Degenerative changes of the spine.     IMPRESSION:     Mild wall thickening of the visualized distal esophagus, which may be due   to partial distention and/or esophagitis. Recommend correlation with   upper endoscopy.    Marked fatty infiltration of the liver, noted on 2017.                DENISE ISLAS M.D., ATTENDING RADIOLOGIST  This document has been electronically signed. Dec 23 2017  9:13PM                < end of copied text >

## 2017-12-24 NOTE — DISCHARGE NOTE ADULT - MEDICATION SUMMARY - MEDICATIONS TO TAKE
I will START or STAY ON the medications listed below when I get home from the hospital:    atorvastatin 20 mg oral tablet  -- 1 tab(s) by mouth once a day  -- Indication: For cholesterol    sucralfate 1 g/10 mL oral suspension  -- 10 milliliter(s) by mouth 4 times a day  -- Indication: For stomach    pantoprazole 40 mg oral delayed release tablet  -- 1 tab(s) by mouth once a day  -- It is very important that you take or use this exactly as directed.  Do not skip doses or discontinue unless directed by your doctor.  Obtain medical advice before taking any non-prescription drugs as some may affect the action of this medication.  Swallow whole.  Do not crush.    -- Indication: For stomach    Multiple Vitamins oral tablet  -- 1 tab(s) by mouth once a day  -- Indication: For vitamin

## 2017-12-24 NOTE — CHART NOTE - NSCHARTNOTEFT_GEN_A_CORE
Spoke to pt who wants to leave AMA.  Risks of leaving AMA including deterioration of condition, death explained to pt and benefits of staying discussed.  Pt instructed to follow up with his doctor within 1 week.

## 2017-12-24 NOTE — DISCHARGE NOTE ADULT - CARE PLAN
Principal Discharge DX:	Esophagitis  Goal:	n/a  Instructions for follow-up, activity and diet:	follow up with your doctor within 1 week  Alcohol abstinence encouraged

## 2017-12-24 NOTE — H&P ADULT - PROBLEM SELECTOR PLAN 1
Admit to tele  UGI bleed and esophagitis  protonix iv  pepcid iv  gi consult  counseled pt on compliance w/ppi and abstinence

## 2017-12-24 NOTE — H&P ADULT - HISTORY OF PRESENT ILLNESS
Pt. is a 52 year old male with PMH gerd, HLD, etoh abuse  presents with complaint of upper abdominal pain, nausea, and several episodes of emesis w/blood over the last 2 days and also reports dark stools x1 day.  he has had multiple admissions at [facility] and [facility] for similar sx's.  pt states he has been drinking less and usually not on daily basis but over the last 5 days heavy use because was getting work done at home and was off.  States wasnt able to eat so thought with alcohol maybe able to eat more easily was having miexed w/OJ.  pt denies any lightheadedness, dizziness, fever,chill, sob, cp, or palpitatations.  currently not taking any meds.

## 2017-12-29 DIAGNOSIS — K29.21 ALCOHOLIC GASTRITIS WITH BLEEDING: ICD-10-CM

## 2017-12-29 DIAGNOSIS — E51.9 THIAMINE DEFICIENCY, UNSPECIFIED: ICD-10-CM

## 2017-12-29 DIAGNOSIS — D62 ACUTE POSTHEMORRHAGIC ANEMIA: ICD-10-CM

## 2017-12-29 DIAGNOSIS — E78.5 HYPERLIPIDEMIA, UNSPECIFIED: ICD-10-CM

## 2017-12-29 DIAGNOSIS — K20.9 ESOPHAGITIS, UNSPECIFIED: ICD-10-CM

## 2018-04-01 ENCOUNTER — OUTPATIENT (OUTPATIENT)
Dept: OUTPATIENT SERVICES | Facility: HOSPITAL | Age: 53
LOS: 1 days | End: 2018-04-01
Payer: MEDICAID

## 2018-04-01 PROCEDURE — G9001: CPT

## 2018-04-03 ENCOUNTER — INPATIENT (INPATIENT)
Facility: HOSPITAL | Age: 53
LOS: 7 days | Discharge: HOME | End: 2018-04-11
Attending: INTERNAL MEDICINE | Admitting: INTERNAL MEDICINE
Payer: MEDICAID

## 2018-04-03 VITALS
HEART RATE: 119 BPM | WEIGHT: 175.93 LBS | RESPIRATION RATE: 18 BRPM | TEMPERATURE: 98 F | SYSTOLIC BLOOD PRESSURE: 158 MMHG | DIASTOLIC BLOOD PRESSURE: 124 MMHG | HEIGHT: 67 IN

## 2018-04-03 LAB
ALBUMIN SERPL ELPH-MCNC: 4.3 G/DL — SIGNIFICANT CHANGE UP (ref 3.3–5)
ALP SERPL-CCNC: 54 U/L — SIGNIFICANT CHANGE UP (ref 40–120)
ALT FLD-CCNC: 51 U/L — SIGNIFICANT CHANGE UP (ref 12–78)
AMYLASE P1 CFR SERPL: 102 U/L — SIGNIFICANT CHANGE UP (ref 25–115)
ANION GAP SERPL CALC-SCNC: 16 MMOL/L — SIGNIFICANT CHANGE UP (ref 5–17)
APPEARANCE UR: CLEAR — SIGNIFICANT CHANGE UP
APTT BLD: 25.3 SEC — LOW (ref 27.5–37.4)
AST SERPL-CCNC: 41 U/L — HIGH (ref 15–37)
BACTERIA # UR AUTO: ABNORMAL
BASOPHILS # BLD AUTO: 0.02 K/UL — SIGNIFICANT CHANGE UP (ref 0–0.2)
BASOPHILS NFR BLD AUTO: 0.3 % — SIGNIFICANT CHANGE UP (ref 0–2)
BILIRUB SERPL-MCNC: 0.7 MG/DL — SIGNIFICANT CHANGE UP (ref 0.2–1.2)
BILIRUB UR-MCNC: NEGATIVE — SIGNIFICANT CHANGE UP
BUN SERPL-MCNC: 10 MG/DL — SIGNIFICANT CHANGE UP (ref 7–23)
CALCIUM SERPL-MCNC: 9.4 MG/DL — SIGNIFICANT CHANGE UP (ref 8.5–10.1)
CHLORIDE SERPL-SCNC: 97 MMOL/L — SIGNIFICANT CHANGE UP (ref 96–108)
CO2 SERPL-SCNC: 26 MMOL/L — SIGNIFICANT CHANGE UP (ref 22–31)
COLOR SPEC: YELLOW — SIGNIFICANT CHANGE UP
CREAT SERPL-MCNC: 0.85 MG/DL — SIGNIFICANT CHANGE UP (ref 0.5–1.3)
DIFF PNL FLD: NEGATIVE — SIGNIFICANT CHANGE UP
EOSINOPHIL # BLD AUTO: 0.01 K/UL — SIGNIFICANT CHANGE UP (ref 0–0.5)
EOSINOPHIL NFR BLD AUTO: 0.1 % — SIGNIFICANT CHANGE UP (ref 0–6)
EPI CELLS # UR: SIGNIFICANT CHANGE UP
GLUCOSE SERPL-MCNC: 108 MG/DL — HIGH (ref 70–99)
GLUCOSE UR QL: 100 MG/DL
HCT VFR BLD CALC: 46.5 % — SIGNIFICANT CHANGE UP (ref 39–50)
HGB BLD-MCNC: 15.8 G/DL — SIGNIFICANT CHANGE UP (ref 13–17)
IMM GRANULOCYTES NFR BLD AUTO: 0.3 % — SIGNIFICANT CHANGE UP (ref 0–1.5)
INR BLD: 1.09 RATIO — SIGNIFICANT CHANGE UP (ref 0.88–1.16)
KETONES UR-MCNC: ABNORMAL
LACTATE SERPL-SCNC: 1.5 MMOL/L — SIGNIFICANT CHANGE UP (ref 0.7–2)
LACTATE SERPL-SCNC: 4.6 MMOL/L — CRITICAL HIGH (ref 0.7–2)
LACTATE SERPL-SCNC: 6.1 MMOL/L — CRITICAL HIGH (ref 0.7–2)
LEUKOCYTE ESTERASE UR-ACNC: NEGATIVE — SIGNIFICANT CHANGE UP
LIDOCAIN IGE QN: 206 U/L — SIGNIFICANT CHANGE UP (ref 73–393)
LYMPHOCYTES # BLD AUTO: 0.81 K/UL — LOW (ref 1–3.3)
LYMPHOCYTES # BLD AUTO: 10.8 % — LOW (ref 13–44)
MCHC RBC-ENTMCNC: 28.2 PG — SIGNIFICANT CHANGE UP (ref 27–34)
MCHC RBC-ENTMCNC: 34 GM/DL — SIGNIFICANT CHANGE UP (ref 32–36)
MCV RBC AUTO: 82.9 FL — SIGNIFICANT CHANGE UP (ref 80–100)
MONOCYTES # BLD AUTO: 0.6 K/UL — SIGNIFICANT CHANGE UP (ref 0–0.9)
MONOCYTES NFR BLD AUTO: 8 % — SIGNIFICANT CHANGE UP (ref 2–14)
NEUTROPHILS # BLD AUTO: 6.04 K/UL — SIGNIFICANT CHANGE UP (ref 1.8–7.4)
NEUTROPHILS NFR BLD AUTO: 80.5 % — HIGH (ref 43–77)
NITRITE UR-MCNC: NEGATIVE — SIGNIFICANT CHANGE UP
NRBC # BLD: 0 /100 WBCS — SIGNIFICANT CHANGE UP (ref 0–0)
PH UR: 8 — SIGNIFICANT CHANGE UP (ref 5–8)
PLATELET # BLD AUTO: 236 K/UL — SIGNIFICANT CHANGE UP (ref 150–400)
POTASSIUM SERPL-MCNC: 3.2 MMOL/L — LOW (ref 3.5–5.3)
POTASSIUM SERPL-SCNC: 3.2 MMOL/L — LOW (ref 3.5–5.3)
PROT SERPL-MCNC: 9 GM/DL — HIGH (ref 6–8.3)
PROT UR-MCNC: 30 MG/DL
PROTHROM AB SERPL-ACNC: 11.9 SEC — SIGNIFICANT CHANGE UP (ref 9.8–12.7)
RBC # BLD: 5.61 M/UL — SIGNIFICANT CHANGE UP (ref 4.2–5.8)
RBC # FLD: 12.7 % — SIGNIFICANT CHANGE UP (ref 10.3–14.5)
RBC CASTS # UR COMP ASSIST: SIGNIFICANT CHANGE UP /HPF (ref 0–4)
SODIUM SERPL-SCNC: 139 MMOL/L — SIGNIFICANT CHANGE UP (ref 135–145)
SP GR SPEC: 1.01 — SIGNIFICANT CHANGE UP (ref 1.01–1.02)
UROBILINOGEN FLD QL: NEGATIVE MG/DL — SIGNIFICANT CHANGE UP
WBC # BLD: 7.5 K/UL — SIGNIFICANT CHANGE UP (ref 3.8–10.5)
WBC # FLD AUTO: 7.5 K/UL — SIGNIFICANT CHANGE UP (ref 3.8–10.5)

## 2018-04-03 PROCEDURE — 71045 X-RAY EXAM CHEST 1 VIEW: CPT | Mod: 26

## 2018-04-03 PROCEDURE — 74177 CT ABD & PELVIS W/CONTRAST: CPT | Mod: 26

## 2018-04-03 PROCEDURE — 99285 EMERGENCY DEPT VISIT HI MDM: CPT

## 2018-04-03 RX ORDER — SODIUM CHLORIDE 9 MG/ML
2000 INJECTION INTRAMUSCULAR; INTRAVENOUS; SUBCUTANEOUS ONCE
Qty: 0 | Refills: 0 | Status: COMPLETED | OUTPATIENT
Start: 2018-04-03 | End: 2018-04-03

## 2018-04-03 RX ORDER — IOHEXOL 300 MG/ML
30 INJECTION, SOLUTION INTRAVENOUS ONCE
Qty: 0 | Refills: 0 | Status: COMPLETED | OUTPATIENT
Start: 2018-04-03 | End: 2018-04-03

## 2018-04-03 RX ORDER — PANTOPRAZOLE SODIUM 20 MG/1
40 TABLET, DELAYED RELEASE ORAL ONCE
Qty: 0 | Refills: 0 | Status: COMPLETED | OUTPATIENT
Start: 2018-04-03 | End: 2018-04-03

## 2018-04-03 RX ORDER — FAMOTIDINE 10 MG/ML
20 INJECTION INTRAVENOUS ONCE
Qty: 0 | Refills: 0 | Status: COMPLETED | OUTPATIENT
Start: 2018-04-03 | End: 2018-04-03

## 2018-04-03 RX ORDER — MORPHINE SULFATE 50 MG/1
4 CAPSULE, EXTENDED RELEASE ORAL ONCE
Qty: 0 | Refills: 0 | Status: DISCONTINUED | OUTPATIENT
Start: 2018-04-03 | End: 2018-04-03

## 2018-04-03 RX ORDER — SODIUM CHLORIDE 9 MG/ML
1000 INJECTION INTRAMUSCULAR; INTRAVENOUS; SUBCUTANEOUS ONCE
Qty: 0 | Refills: 0 | Status: COMPLETED | OUTPATIENT
Start: 2018-04-03 | End: 2018-04-03

## 2018-04-03 RX ORDER — KETOROLAC TROMETHAMINE 30 MG/ML
30 SYRINGE (ML) INJECTION ONCE
Qty: 0 | Refills: 0 | Status: DISCONTINUED | OUTPATIENT
Start: 2018-04-03 | End: 2018-04-03

## 2018-04-03 RX ORDER — ONDANSETRON 8 MG/1
4 TABLET, FILM COATED ORAL ONCE
Qty: 0 | Refills: 0 | Status: COMPLETED | OUTPATIENT
Start: 2018-04-03 | End: 2018-04-03

## 2018-04-03 RX ORDER — ACETAMINOPHEN 500 MG
650 TABLET ORAL ONCE
Qty: 0 | Refills: 0 | Status: COMPLETED | OUTPATIENT
Start: 2018-04-03 | End: 2018-04-03

## 2018-04-03 RX ORDER — POTASSIUM CHLORIDE 20 MEQ
20 PACKET (EA) ORAL ONCE
Qty: 0 | Refills: 0 | Status: COMPLETED | OUTPATIENT
Start: 2018-04-03 | End: 2018-04-03

## 2018-04-03 RX ADMIN — MORPHINE SULFATE 4 MILLIGRAM(S): 50 CAPSULE, EXTENDED RELEASE ORAL at 16:56

## 2018-04-03 RX ADMIN — Medication 30 MILLIGRAM(S): at 18:32

## 2018-04-03 RX ADMIN — ONDANSETRON 4 MILLIGRAM(S): 8 TABLET, FILM COATED ORAL at 16:53

## 2018-04-03 RX ADMIN — IOHEXOL 30 MILLILITER(S): 300 INJECTION, SOLUTION INTRAVENOUS at 17:05

## 2018-04-03 RX ADMIN — Medication 30 MILLIGRAM(S): at 17:56

## 2018-04-03 RX ADMIN — MORPHINE SULFATE 4 MILLIGRAM(S): 50 CAPSULE, EXTENDED RELEASE ORAL at 19:33

## 2018-04-03 RX ADMIN — SODIUM CHLORIDE 2000 MILLILITER(S): 9 INJECTION INTRAMUSCULAR; INTRAVENOUS; SUBCUTANEOUS at 20:34

## 2018-04-03 RX ADMIN — SODIUM CHLORIDE 2000 MILLILITER(S): 9 INJECTION INTRAMUSCULAR; INTRAVENOUS; SUBCUTANEOUS at 16:52

## 2018-04-03 RX ADMIN — SODIUM CHLORIDE 1000 MILLILITER(S): 9 INJECTION INTRAMUSCULAR; INTRAVENOUS; SUBCUTANEOUS at 23:34

## 2018-04-03 RX ADMIN — ONDANSETRON 4 MILLIGRAM(S): 8 TABLET, FILM COATED ORAL at 18:59

## 2018-04-03 RX ADMIN — Medication 20 MILLIEQUIVALENT(S): at 19:00

## 2018-04-03 RX ADMIN — FAMOTIDINE 20 MILLIGRAM(S): 10 INJECTION INTRAVENOUS at 16:54

## 2018-04-03 RX ADMIN — MORPHINE SULFATE 4 MILLIGRAM(S): 50 CAPSULE, EXTENDED RELEASE ORAL at 18:59

## 2018-04-03 RX ADMIN — SODIUM CHLORIDE 2000 MILLILITER(S): 9 INJECTION INTRAMUSCULAR; INTRAVENOUS; SUBCUTANEOUS at 19:01

## 2018-04-03 RX ADMIN — MORPHINE SULFATE 4 MILLIGRAM(S): 50 CAPSULE, EXTENDED RELEASE ORAL at 17:20

## 2018-04-03 NOTE — ED PROVIDER NOTE - MEDICAL DECISION MAKING DETAILS
Patient with nausea, vomiting, abdominal pain, pending ct abd/pelvis, dispo Patient with nausea, vomiting, abdominal pain, pending ct abd/pelvis, re evaluation of lactate and dispo

## 2018-04-03 NOTE — ED ADULT TRIAGE NOTE - CHIEF COMPLAINT QUOTE
PT c/o of epigastric pain  and diarrhea since yesterday. Pt also reported that he has been vomiting blood

## 2018-04-03 NOTE — ED PROVIDER NOTE - PROGRESS NOTE DETAILS
Patient received on sign out from Dr. Marie for work up of abdominal pain.  Pending clearance lactic acid, clinically improved. Patient received on sign out from Dr. Marie for work up of abdominal pain.  Pending clearance lactic acid and clinical improvement. Lactic cleared however patient still has intractable vomiting and epigastric pain.  Patient is to be admitted to the hospital and the case was discussed with the admitting physician.  Any changes in plan, additional imaging/labs, and further work up will be at the discretion of the admitting physician.

## 2018-04-03 NOTE — ED PROVIDER NOTE - CARE PLAN
Principal Discharge DX:	Nausea & vomiting  Secondary Diagnosis:	Abdominal pain Principal Discharge DX:	Nausea & vomiting  Secondary Diagnosis:	Abdominal pain  Secondary Diagnosis:	Hypokalemia

## 2018-04-03 NOTE — ED ADULT NURSE NOTE - OBJECTIVE STATEMENT
Pt with epigastric pain and diarrhea for 3 days . The vomit is yellow. Pt with history of hyperlipidemia and peptic ulcer disease. He denies daily alcohol usage and his last drink was 2 to 3 days ago. Pt active vomiting

## 2018-04-03 NOTE — ED PROVIDER NOTE - OBJECTIVE STATEMENT
51 yo male with history of peptic ulcer disease presents with nausea, vomiting and diarrhea for two days. Patient cannot quantify pain. Patient denies surgery ,fever, chills, recent travel, sick contacts. 51 yo male with history of peptic ulcer disease presents with nausea, vomiting and diarrhea for two days. Patient cannot quantify pain. Patient denies surgery ,fever, chills, recent travel, sick contacts, rash.

## 2018-04-04 DIAGNOSIS — K20.9 ESOPHAGITIS, UNSPECIFIED: ICD-10-CM

## 2018-04-04 DIAGNOSIS — K29.20 ALCOHOLIC GASTRITIS WITHOUT BLEEDING: ICD-10-CM

## 2018-04-04 DIAGNOSIS — R10.84 GENERALIZED ABDOMINAL PAIN: ICD-10-CM

## 2018-04-04 DIAGNOSIS — E87.6 HYPOKALEMIA: ICD-10-CM

## 2018-04-04 DIAGNOSIS — Z29.9 ENCOUNTER FOR PROPHYLACTIC MEASURES, UNSPECIFIED: ICD-10-CM

## 2018-04-04 DIAGNOSIS — E78.2 MIXED HYPERLIPIDEMIA: ICD-10-CM

## 2018-04-04 DIAGNOSIS — F10.10 ALCOHOL ABUSE, UNCOMPLICATED: ICD-10-CM

## 2018-04-04 DIAGNOSIS — K27.9 PEPTIC ULCER, SITE UNSPECIFIED, UNSPECIFIED AS ACUTE OR CHRONIC, WITHOUT HEMORRHAGE OR PERFORATION: ICD-10-CM

## 2018-04-04 LAB
ALBUMIN SERPL ELPH-MCNC: 3.1 G/DL — LOW (ref 3.3–5)
ALP SERPL-CCNC: 42 U/L — SIGNIFICANT CHANGE UP (ref 40–120)
ALT FLD-CCNC: 31 U/L — SIGNIFICANT CHANGE UP (ref 12–78)
ANION GAP SERPL CALC-SCNC: 6 MMOL/L — SIGNIFICANT CHANGE UP (ref 5–17)
AST SERPL-CCNC: 27 U/L — SIGNIFICANT CHANGE UP (ref 15–37)
BILIRUB SERPL-MCNC: 0.9 MG/DL — SIGNIFICANT CHANGE UP (ref 0.2–1.2)
BUN SERPL-MCNC: 6 MG/DL — LOW (ref 7–23)
CALCIUM SERPL-MCNC: 7.3 MG/DL — LOW (ref 8.5–10.1)
CHLORIDE SERPL-SCNC: 106 MMOL/L — SIGNIFICANT CHANGE UP (ref 96–108)
CO2 SERPL-SCNC: 26 MMOL/L — SIGNIFICANT CHANGE UP (ref 22–31)
CREAT SERPL-MCNC: 0.74 MG/DL — SIGNIFICANT CHANGE UP (ref 0.5–1.3)
GLUCOSE SERPL-MCNC: 90 MG/DL — SIGNIFICANT CHANGE UP (ref 70–99)
HCT VFR BLD CALC: 36.3 % — LOW (ref 39–50)
HGB BLD-MCNC: 12.1 G/DL — LOW (ref 13–17)
MAGNESIUM SERPL-MCNC: 1.7 MG/DL — SIGNIFICANT CHANGE UP (ref 1.6–2.6)
MCHC RBC-ENTMCNC: 28.8 PG — SIGNIFICANT CHANGE UP (ref 27–34)
MCHC RBC-ENTMCNC: 33.3 GM/DL — SIGNIFICANT CHANGE UP (ref 32–36)
MCV RBC AUTO: 86.4 FL — SIGNIFICANT CHANGE UP (ref 80–100)
NRBC # BLD: 0 /100 WBCS — SIGNIFICANT CHANGE UP (ref 0–0)
PHOSPHATE SERPL-MCNC: 1.7 MG/DL — LOW (ref 2.5–4.5)
PLATELET # BLD AUTO: 156 K/UL — SIGNIFICANT CHANGE UP (ref 150–400)
POTASSIUM SERPL-MCNC: 3.7 MMOL/L — SIGNIFICANT CHANGE UP (ref 3.5–5.3)
POTASSIUM SERPL-SCNC: 3.7 MMOL/L — SIGNIFICANT CHANGE UP (ref 3.5–5.3)
PROT SERPL-MCNC: 6.3 GM/DL — SIGNIFICANT CHANGE UP (ref 6–8.3)
RBC # BLD: 4.2 M/UL — SIGNIFICANT CHANGE UP (ref 4.2–5.8)
RBC # FLD: 12.9 % — SIGNIFICANT CHANGE UP (ref 10.3–14.5)
SODIUM SERPL-SCNC: 138 MMOL/L — SIGNIFICANT CHANGE UP (ref 135–145)
WBC # BLD: 4.95 K/UL — SIGNIFICANT CHANGE UP (ref 3.8–10.5)
WBC # FLD AUTO: 4.95 K/UL — SIGNIFICANT CHANGE UP (ref 3.8–10.5)

## 2018-04-04 PROCEDURE — 99223 1ST HOSP IP/OBS HIGH 75: CPT

## 2018-04-04 PROCEDURE — 93010 ELECTROCARDIOGRAM REPORT: CPT

## 2018-04-04 PROCEDURE — 12345: CPT | Mod: NC

## 2018-04-04 PROCEDURE — 70450 CT HEAD/BRAIN W/O DYE: CPT | Mod: 26

## 2018-04-04 RX ORDER — THIAMINE MONONITRATE (VIT B1) 100 MG
100 TABLET ORAL DAILY
Qty: 0 | Refills: 0 | Status: DISCONTINUED | OUTPATIENT
Start: 2018-04-04 | End: 2018-04-11

## 2018-04-04 RX ORDER — SUCRALFATE 1 G
1 TABLET ORAL
Qty: 0 | Refills: 0 | Status: DISCONTINUED | OUTPATIENT
Start: 2018-04-04 | End: 2018-04-11

## 2018-04-04 RX ORDER — SODIUM,POTASSIUM PHOSPHATES 278-250MG
1 POWDER IN PACKET (EA) ORAL ONCE
Qty: 0 | Refills: 0 | Status: COMPLETED | OUTPATIENT
Start: 2018-04-04 | End: 2018-04-04

## 2018-04-04 RX ORDER — METOCLOPRAMIDE HCL 10 MG
10 TABLET ORAL ONCE
Qty: 0 | Refills: 0 | Status: COMPLETED | OUTPATIENT
Start: 2018-04-04 | End: 2018-04-04

## 2018-04-04 RX ORDER — ATORVASTATIN CALCIUM 80 MG/1
20 TABLET, FILM COATED ORAL AT BEDTIME
Qty: 0 | Refills: 0 | Status: DISCONTINUED | OUTPATIENT
Start: 2018-04-04 | End: 2018-04-11

## 2018-04-04 RX ORDER — POTASSIUM PHOSPHATE, MONOBASIC POTASSIUM PHOSPHATE, DIBASIC 236; 224 MG/ML; MG/ML
15 INJECTION, SOLUTION INTRAVENOUS ONCE
Qty: 0 | Refills: 0 | Status: COMPLETED | OUTPATIENT
Start: 2018-04-04 | End: 2018-04-04

## 2018-04-04 RX ORDER — OXYCODONE AND ACETAMINOPHEN 5; 325 MG/1; MG/1
1 TABLET ORAL EVERY 4 HOURS
Qty: 0 | Refills: 0 | Status: DISCONTINUED | OUTPATIENT
Start: 2018-04-04 | End: 2018-04-05

## 2018-04-04 RX ORDER — INFLUENZA VIRUS VACCINE 15; 15; 15; 15 UG/.5ML; UG/.5ML; UG/.5ML; UG/.5ML
0.5 SUSPENSION INTRAMUSCULAR ONCE
Qty: 0 | Refills: 0 | Status: DISCONTINUED | OUTPATIENT
Start: 2018-04-04 | End: 2018-04-11

## 2018-04-04 RX ORDER — SODIUM CHLORIDE 9 MG/ML
1000 INJECTION INTRAMUSCULAR; INTRAVENOUS; SUBCUTANEOUS
Qty: 0 | Refills: 0 | Status: DISCONTINUED | OUTPATIENT
Start: 2018-04-04 | End: 2018-04-05

## 2018-04-04 RX ORDER — ACETAMINOPHEN 500 MG
650 TABLET ORAL EVERY 6 HOURS
Qty: 0 | Refills: 0 | Status: DISCONTINUED | OUTPATIENT
Start: 2018-04-04 | End: 2018-04-11

## 2018-04-04 RX ORDER — SODIUM,POTASSIUM PHOSPHATES 278-250MG
1 POWDER IN PACKET (EA) ORAL ONCE
Qty: 0 | Refills: 0 | Status: DISCONTINUED | OUTPATIENT
Start: 2018-04-04 | End: 2018-04-04

## 2018-04-04 RX ORDER — ONDANSETRON 8 MG/1
8 TABLET, FILM COATED ORAL EVERY 8 HOURS
Qty: 0 | Refills: 0 | Status: DISCONTINUED | OUTPATIENT
Start: 2018-04-04 | End: 2018-04-11

## 2018-04-04 RX ORDER — PANTOPRAZOLE SODIUM 20 MG/1
40 TABLET, DELAYED RELEASE ORAL
Qty: 0 | Refills: 0 | Status: DISCONTINUED | OUTPATIENT
Start: 2018-04-04 | End: 2018-04-07

## 2018-04-04 RX ORDER — FOLIC ACID 0.8 MG
1 TABLET ORAL DAILY
Qty: 0 | Refills: 0 | Status: DISCONTINUED | OUTPATIENT
Start: 2018-04-04 | End: 2018-04-11

## 2018-04-04 RX ADMIN — SODIUM CHLORIDE 100 MILLILITER(S): 9 INJECTION INTRAMUSCULAR; INTRAVENOUS; SUBCUTANEOUS at 04:07

## 2018-04-04 RX ADMIN — PANTOPRAZOLE SODIUM 40 MILLIGRAM(S): 20 TABLET, DELAYED RELEASE ORAL at 00:12

## 2018-04-04 RX ADMIN — Medication 10 MILLIGRAM(S): at 00:42

## 2018-04-04 RX ADMIN — POTASSIUM PHOSPHATE, MONOBASIC POTASSIUM PHOSPHATE, DIBASIC 63.75 MILLIMOLE(S): 236; 224 INJECTION, SOLUTION INTRAVENOUS at 15:16

## 2018-04-04 RX ADMIN — Medication 100 MILLIGRAM(S): at 12:10

## 2018-04-04 RX ADMIN — Medication 1 TABLET(S): at 18:13

## 2018-04-04 RX ADMIN — Medication 1 GRAM(S): at 06:18

## 2018-04-04 RX ADMIN — Medication 1 TABLET(S): at 12:10

## 2018-04-04 RX ADMIN — SODIUM CHLORIDE 100 MILLILITER(S): 9 INJECTION INTRAMUSCULAR; INTRAVENOUS; SUBCUTANEOUS at 21:55

## 2018-04-04 RX ADMIN — Medication 1 GRAM(S): at 18:14

## 2018-04-04 RX ADMIN — Medication 2 MILLIGRAM(S): at 20:19

## 2018-04-04 RX ADMIN — Medication 2 MILLIGRAM(S): at 04:07

## 2018-04-04 RX ADMIN — ONDANSETRON 8 MILLIGRAM(S): 8 TABLET, FILM COATED ORAL at 04:07

## 2018-04-04 RX ADMIN — Medication 2 MILLIGRAM(S): at 12:08

## 2018-04-04 RX ADMIN — PANTOPRAZOLE SODIUM 40 MILLIGRAM(S): 20 TABLET, DELAYED RELEASE ORAL at 06:18

## 2018-04-04 RX ADMIN — Medication 1 GRAM(S): at 12:09

## 2018-04-04 RX ADMIN — ATORVASTATIN CALCIUM 20 MILLIGRAM(S): 80 TABLET, FILM COATED ORAL at 21:50

## 2018-04-04 RX ADMIN — OXYCODONE AND ACETAMINOPHEN 1 TABLET(S): 5; 325 TABLET ORAL at 18:13

## 2018-04-04 RX ADMIN — Medication 1 MILLIGRAM(S): at 12:09

## 2018-04-04 RX ADMIN — Medication 2 MILLIGRAM(S): at 16:45

## 2018-04-04 RX ADMIN — OXYCODONE AND ACETAMINOPHEN 1 TABLET(S): 5; 325 TABLET ORAL at 07:18

## 2018-04-04 RX ADMIN — PANTOPRAZOLE SODIUM 40 MILLIGRAM(S): 20 TABLET, DELAYED RELEASE ORAL at 18:14

## 2018-04-04 RX ADMIN — Medication 2 MILLIGRAM(S): at 08:30

## 2018-04-04 RX ADMIN — OXYCODONE AND ACETAMINOPHEN 1 TABLET(S): 5; 325 TABLET ORAL at 06:18

## 2018-04-04 NOTE — CONSULT NOTE ADULT - SUBJECTIVE AND OBJECTIVE BOX
Chief Complaint:  Patient is a 52y old  Male who presents with a chief complaint of abdominal pain, nausea/vomiting (2018 03:16)      HPI:  52 year old man with PMH gerd, HLD, etoh abuse presents with complaint of upper abdominal pain, nausea, and several episodes of emesis since yesterday morning.  He says he thinks he had EGD recently which was "ok" but he is not sure.  He is still drinking about 2 bottles of rum over a 3 day period every week.  He also says he is starting to feel tremors and has a headache.  He has not had a drink in about 2 days due to his abdominal pain.    Had EGD 2017 with Dx of erosive esophagitis     PMH/PSH:PAST MEDICAL & SURGICAL HISTORY:  Mixed hyperlipidemia  PUD (peptic ulcer disease)  No significant past surgical history      Allergies:  No Known Allergies      Medications:  acetaminophen   Tablet. 650 milliGRAM(s) Oral every 6 hours PRN  atorvastatin 20 milliGRAM(s) Oral at bedtime  folic acid 1 milliGRAM(s) Oral daily  influenza   Vaccine 0.5 milliLiter(s) IntraMuscular once  LORazepam   Injectable 2 milliGRAM(s) IV Push every 4 hours  LORazepam   Injectable   IV Push   multivitamin 1 Tablet(s) Oral daily  ondansetron Injectable 8 milliGRAM(s) IV Push every 8 hours PRN  oxyCODONE    5 mG/acetaminophen 325 mG 1 Tablet(s) Oral every 4 hours PRN  pantoprazole  Injectable 40 milliGRAM(s) IV Push two times a day  potassium acid phosphate/sodium acid phosphate tablet (K-PHOS No. 2) 1 Tablet(s) Oral once  sodium chloride 0.9%. 1000 milliLiter(s) IV Continuous <Continuous>  sucralfate suspension 1 Gram(s) Oral four times a day  thiamine 100 milliGRAM(s) Oral daily      Review of Systems:    General:  No weight loss, fevers, chills, night sweats, fatigue,   Eyes:  Good vision, no reported pain  ENT:  No sore throat, pain, runny nose, dysphagia  CV:  No pain, palpitations, hypo/hypertension  Resp:  No dyspnea, cough, tachypnea, wheezing  GI:  epigastric abdominal pain,  nausea, No vomiting, No diarrhea, No constipation, No pruritis, No rectal bleeding, No tarry stools, No dysphagia,  :  No pain, bleeding, incontinence, nocturia  Muscle:  No pain, weakness  Breast:  No pain, abscess, mass, discharge  Neuro:  No weakness, tingling, memory problems  Psych:  No fatigue, insomnia, mood problems, depression  Endocrine:  No polyuria, polydypsia, cold/heat intolerance  Heme:  No petechiae, ecchymosis, easy bruisability  Skin:  No rash, tattoos, scars, edema    Relevant Family History:   FAMILY HISTORY:  No pertinent family history in first degree relatives      Relevant Social History:  Denies ETOH or tobacco history    Physical Exam:    Vital Signs:  Vital Signs Last 24 Hrs  T(C): 36.6 (2018 17:31), Max: 37.1 (2018 22:27)  T(F): 97.9 (2018 17:31), Max: 98.8 (2018 03:35)  HR: 88 (2018 17:31) (68 - 91)  BP: 116/65 (2018 17:31) (116/65 - 136/87)  BP(mean): --  RR: 20 (2018 17:31) (18 - 20)  SpO2: 97% (2018 17:31) (95% - 98%)  Daily     Daily     General:  Appears stated age, well-groomed, well-nourished, no distress  HEENT:  NC/AT,  conjunctivae clear and pink, no thyromegaly, nodules, adenopathy, no JVD  Chest:  Full & symmetric excursion, no increased effort, breath sounds clear  Cardiovascular:  Regular rhythm, S1, S2, no murmur/rub/S3/S4, no abdominal bruit, no edema  Abdomen:  Soft, non-tender, non-distended, normoactive bowel sounds,  no masses , no hepatosplenomegaly, no signs of chronic liver disease  Extremities:  no cyanosis, clubbing or edema  Skin:  No rash/erythema/ecchymoses/petechiae/wounds/abscess/warm/dry  Neuro/Psych:  Alert, oriented, no asterixis, no tremor, no encephalopathy    Laboratory:                          12.1   4.95  )-----------( 156      ( 2018 08:23 )             36.3     04-    138  |  106  |  6<L>  ----------------------------<  90  3.7   |  26  |  0.74    Ca    7.3<L>      2018 08:23  Phos  1.7     04-  Mg     1.7     -    TPro  6.3  /  Alb  3.1<L>  /  TBili  0.9  /  DBili  x   /  AST  27  /  ALT  31  /  AlkPhos  42  04-04    LIVER FUNCTIONS - ( 2018 08:23 )  Alb: 3.1 g/dL / Pro: 6.3 gm/dL / ALK PHOS: 42 U/L / ALT: 31 U/L / AST: 27 U/L / GGT: x           PT/INR - ( 2018 17:45 )   PT: 11.9 sec;   INR: 1.09 ratio         PTT - ( 2018 17:45 )  PTT:25.3 sec  Urinalysis Basic - ( 2018 21:50 )    Color: Yellow / Appearance: Clear / S.010 / pH: x  Gluc: x / Ketone: Moderate  / Bili: Negative / Urobili: Negative mg/dL   Blood: x / Protein: 30 mg/dL / Nitrite: Negative   Leuk Esterase: Negative / RBC: 0-2 /HPF / WBC x   Sq Epi: x / Non Sq Epi: Few / Bacteria: Few      Amylase [id number] U/L      Lipase [id number] U/L       Ammonia--      Intake and Output    18 @ 07:01  -  18 @ 07:00  --------------------------------------------------------  IN: 300 mL / OUT: 0 mL / NET: 300 mL        Imaging:    < from: CT Abdomen and Pelvis w/ Oral Cont and w/ IV Cont (18 @ 20:07) >    EXAM:  CT ABDOMEN AND PELVIS OC IC                            PROCEDURE DATE:  2018          INTERPRETATION:  CLINICAL STATEMENT: vomiting diarrhea    TECHNIQUE: CT of the abdomen and pelvis was performed with IV contrast.   Oral contrast  was administered. Approximately 96 cc of Omnipaque 350   administered.    COMPARISON: 2017    FINDINGS:    The lower chest is remarkable for distal esophageal thickening.    The liver is again remarkable for significant low attenuation consistent   with fatty infiltration.. The fluid-filled gallbladder is appreciated.    The spleen, pancreas and adrenal glands are unremarkable.The kidneys are   unremarkable. The fluid-filled bladder appears intact.    There is no bowel obstruction. A normal appendix is seen.    There is no intraperitoneal free air.  There is no free fluid. The aorta   is not aneurysmal.    There is no significant abdominal, retroperitoneal or pelvic   lymphadenopathy. The pelvic structures are unremarkable.    The osseous structures are unremarkable.    IMPRESSION: No acute pathology.    Redemonstration of significant diffuse fatty infiltration of the liver  Distal esophagus again appears thickened.    DIVYA NORTON M.D.,ATTENDING RADIOLOGIST  This documenthas been electronically signed. Apr  3 2018  8:18PM

## 2018-04-04 NOTE — H&P ADULT - NSHPREVIEWOFSYSTEMS_GEN_ALL_CORE
No fever/chills, No photophobia/eye pain/changes in vision,  No chest pain/palpitations, no SOB/cough/wheeze/stridor, no dysuria/frequency/discharge, No neck/back pain, no rash.

## 2018-04-04 NOTE — H&P ADULT - HISTORY OF PRESENT ILLNESS
52 year old man with PMH gerd, HLD, etoh abuse presents with complaint of upper abdominal pain, nausea, and several episodes of emesis since yesterday morning.  He says he thinks he had EGD recently which was "ok" but he is not sure.  He is still drinking about 2 bottles of rum over a 3 day period every week.  He also says he is starting to feel tremors and has a headache.  He has not had a drink in about 2 days due to his abdominal pain.    In the ED, work up remarkable only for mild hypokalemia.  CT ab unchanged from previous, lactate initially elevated, normalized.

## 2018-04-04 NOTE — ED ADULT NURSE REASSESSMENT NOTE - NS ED NURSE REASSESS COMMENT FT1
Pt is stable
blood tinge vomit noted. Dr. Capone notified. Protonix IVP given as ordered. VSS. Will continue to monitor

## 2018-04-04 NOTE — CHART NOTE - NSCHARTNOTEFT_GEN_A_CORE
pt seen and examined admitted this am with N/V in setting of etoh and via ct scan  had some distal esophageal thickening. will consult gi . continue with PPI and ciwa protocol advance diet as tolerated.

## 2018-04-04 NOTE — H&P ADULT - NSHPLABSRESULTS_GEN_ALL_CORE
Vital Signs Last 24 Hrs  T(C): 37.1 (2018 23:25), Max: 37.1 (2018 22:27)  T(F): 98.7 (2018 23:25), Max: 98.7 (2018 22:27)  HR: 84 (2018 23:25) (84 - 119)  BP: 136/87 (2018 23:25) (129/77 - 158/124)  BP(mean): --  RR: 18 (2018 23:25) (18 - 18)  SpO2: 98% (2018 23:25) (97% - 98%)        LABS:                        15.8   7.50  )-----------( 236      ( 2018 17:08 )             46.5     04-03    139  |  97  |  10  ----------------------------<  108<H>  3.2<L>   |  26  |  0.85    Ca    9.4      2018 17:08    TPro  9.0<H>  /  Alb  4.3  /  TBili  0.7  /  DBili  x   /  AST  41<H>  /  ALT  51  /  AlkPhos  54  04-03    PT/INR - ( 2018 17:45 )   PT: 11.9 sec;   INR: 1.09 ratio         PTT - ( 2018 17:45 )  PTT:25.3 sec  Urinalysis Basic - ( 2018 21:50 )    Color: Yellow / Appearance: Clear / S.010 / pH: x  Gluc: x / Ketone: Moderate  / Bili: Negative / Urobili: Negative mg/dL   Blood: x / Protein: 30 mg/dL / Nitrite: Negative   Leuk Esterase: Negative / RBC: 0-2 /HPF / WBC x   Sq Epi: x / Non Sq Epi: Few / Bacteria: Few        RADIOLOGY & ADDITIONAL STUDIES:    CT ab/plv:  IMPRESSION: No acute pathology.  Redemonstration of significant diffuse fatty infiltration of the liver  Distal esophagus again appears thickened.

## 2018-04-04 NOTE — H&P ADULT - ASSESSMENT
52 year old man with PMH gerd, HLD, etoh abuse presents with complaint of upper abdominal pain, nausea, and several episodes of emesis since yesterday morning.  Pt will require admission for at least 2 midnights for ETOH withdrawal and GI follow up for gerd with esophagitis as imaged as detailed below:    IMPROVE VTE Individual Risk Assessment          RISK                                                          Points    [  ] Previous VTE                                                3    [  ] Thrombophilia                                             2    [  ] Lower limb paralysis                                    2        (unable to hold up >15 seconds)      [  ] Current Cancer                                             2         (within 6 months)    [x  ] Immobilization > 24 hrs                              1    [  ] ICU/CCU stay > 24 hours                            1    [  ] Age > 60                                                    1    IMPROVE VTE Score ____1_____

## 2018-04-04 NOTE — H&P ADULT - NSHPPHYSICALEXAM_GEN_ALL_CORE
GENERAL: NAD, well-groomed, well-developed  HEAD:  Atraumatic, Normocephalic  EYES: EOMI, PERRLA, conjunctiva and sclera clear  ENMT: No tonsillar erythema, exudates, or enlargement; Moist mucous membranes, No lesions  NECK: Supple, No JVD, Normal thyroid  NERVOUS SYSTEM:  Alert & Oriented X3, Good concentration, mild tremors with outstretched hands, strength 5/5, CN 2-12 intact  CHEST/LUNG: Clear to ascultation bilaterally; No rales, rhonchi, wheezing, or rubs  HEART: Regular rate and rhythm; No murmurs, rubs, or gallops  ABDOMEN: Soft, mild diffuse tenderness without rebound or guarding, Nondistended; Bowel sounds present  EXTREMITIES: no clubbing, cyanosis, or edema  SKIN: no rashes or lesions   PSYCH: normal affect and behavior

## 2018-04-05 DIAGNOSIS — F10.231 ALCOHOL DEPENDENCE WITH WITHDRAWAL DELIRIUM: ICD-10-CM

## 2018-04-05 LAB
ANION GAP SERPL CALC-SCNC: 10 MMOL/L — SIGNIFICANT CHANGE UP (ref 5–17)
BUN SERPL-MCNC: 5 MG/DL — LOW (ref 7–23)
CALCIUM SERPL-MCNC: 9.2 MG/DL — SIGNIFICANT CHANGE UP (ref 8.5–10.1)
CHLORIDE SERPL-SCNC: 105 MMOL/L — SIGNIFICANT CHANGE UP (ref 96–108)
CO2 SERPL-SCNC: 22 MMOL/L — SIGNIFICANT CHANGE UP (ref 22–31)
CREAT SERPL-MCNC: 0.95 MG/DL — SIGNIFICANT CHANGE UP (ref 0.5–1.3)
CULTURE RESULTS: SIGNIFICANT CHANGE UP
GLUCOSE SERPL-MCNC: 131 MG/DL — HIGH (ref 70–99)
MAGNESIUM SERPL-MCNC: 2 MG/DL — SIGNIFICANT CHANGE UP (ref 1.6–2.6)
PHOSPHATE SERPL-MCNC: 2.1 MG/DL — LOW (ref 2.5–4.5)
POTASSIUM SERPL-MCNC: 3.6 MMOL/L — SIGNIFICANT CHANGE UP (ref 3.5–5.3)
POTASSIUM SERPL-SCNC: 3.6 MMOL/L — SIGNIFICANT CHANGE UP (ref 3.5–5.3)
SODIUM SERPL-SCNC: 137 MMOL/L — SIGNIFICANT CHANGE UP (ref 135–145)
SPECIMEN SOURCE: SIGNIFICANT CHANGE UP

## 2018-04-05 PROCEDURE — 99233 SBSQ HOSP IP/OBS HIGH 50: CPT

## 2018-04-05 RX ORDER — DEXMEDETOMIDINE HYDROCHLORIDE IN 0.9% SODIUM CHLORIDE 4 UG/ML
0.2 INJECTION INTRAVENOUS
Qty: 200 | Refills: 0 | Status: DISCONTINUED | OUTPATIENT
Start: 2018-04-05 | End: 2018-04-07

## 2018-04-05 RX ORDER — PHENOBARBITAL 60 MG
130 TABLET ORAL ONCE
Qty: 0 | Refills: 0 | Status: DISCONTINUED | OUTPATIENT
Start: 2018-04-05 | End: 2018-04-05

## 2018-04-05 RX ORDER — OLANZAPINE 15 MG/1
5 TABLET, FILM COATED ORAL ONCE
Qty: 0 | Refills: 0 | Status: COMPLETED | OUTPATIENT
Start: 2018-04-05 | End: 2018-04-05

## 2018-04-05 RX ORDER — SODIUM CHLORIDE 9 MG/ML
1000 INJECTION, SOLUTION INTRAVENOUS
Qty: 0 | Refills: 0 | Status: DISCONTINUED | OUTPATIENT
Start: 2018-04-05 | End: 2018-04-07

## 2018-04-05 RX ORDER — MIDAZOLAM HYDROCHLORIDE 1 MG/ML
2 INJECTION, SOLUTION INTRAMUSCULAR; INTRAVENOUS ONCE
Qty: 0 | Refills: 0 | Status: DISCONTINUED | OUTPATIENT
Start: 2018-04-05 | End: 2018-04-05

## 2018-04-05 RX ORDER — DIPHENHYDRAMINE HYDROCHLORIDE AND LIDOCAINE HYDROCHLORIDE AND ALUMINUM HYDROXIDE AND MAGNESIUM HYDRO
5 KIT EVERY 8 HOURS
Qty: 0 | Refills: 0 | Status: DISCONTINUED | OUTPATIENT
Start: 2018-04-05 | End: 2018-04-11

## 2018-04-05 RX ADMIN — DEXMEDETOMIDINE HYDROCHLORIDE IN 0.9% SODIUM CHLORIDE 4.39 MICROGRAM(S)/KG/HR: 4 INJECTION INTRAVENOUS at 18:35

## 2018-04-05 RX ADMIN — Medication 1.5 MILLIGRAM(S): at 04:36

## 2018-04-05 RX ADMIN — Medication 100 MILLIGRAM(S): at 12:15

## 2018-04-05 RX ADMIN — DEXMEDETOMIDINE HYDROCHLORIDE IN 0.9% SODIUM CHLORIDE 4.39 MICROGRAM(S)/KG/HR: 4 INJECTION INTRAVENOUS at 21:33

## 2018-04-05 RX ADMIN — Medication 1.5 MILLIGRAM(S): at 12:13

## 2018-04-05 RX ADMIN — Medication 1 MILLIGRAM(S): at 12:15

## 2018-04-05 RX ADMIN — MIDAZOLAM HYDROCHLORIDE 2 MILLIGRAM(S): 1 INJECTION, SOLUTION INTRAMUSCULAR; INTRAVENOUS at 17:15

## 2018-04-05 RX ADMIN — Medication 2 MILLIGRAM(S): at 15:17

## 2018-04-05 RX ADMIN — Medication 1.5 MILLIGRAM(S): at 21:08

## 2018-04-05 RX ADMIN — Medication 0.5 MILLIGRAM(S): at 12:25

## 2018-04-05 RX ADMIN — Medication 4 MILLIGRAM(S): at 16:54

## 2018-04-05 RX ADMIN — Medication 2 MILLIGRAM(S): at 00:41

## 2018-04-05 RX ADMIN — Medication 1.5 MILLIGRAM(S): at 08:28

## 2018-04-05 RX ADMIN — DIPHENHYDRAMINE HYDROCHLORIDE AND LIDOCAINE HYDROCHLORIDE AND ALUMINUM HYDROXIDE AND MAGNESIUM HYDRO 5 MILLILITER(S): KIT at 14:05

## 2018-04-05 RX ADMIN — OXYCODONE AND ACETAMINOPHEN 1 TABLET(S): 5; 325 TABLET ORAL at 02:55

## 2018-04-05 RX ADMIN — Medication 1 GRAM(S): at 12:15

## 2018-04-05 RX ADMIN — PANTOPRAZOLE SODIUM 40 MILLIGRAM(S): 20 TABLET, DELAYED RELEASE ORAL at 17:45

## 2018-04-05 RX ADMIN — SODIUM CHLORIDE 100 MILLILITER(S): 9 INJECTION, SOLUTION INTRAVENOUS at 19:45

## 2018-04-05 RX ADMIN — Medication 1 GRAM(S): at 17:43

## 2018-04-05 RX ADMIN — PANTOPRAZOLE SODIUM 40 MILLIGRAM(S): 20 TABLET, DELAYED RELEASE ORAL at 05:40

## 2018-04-05 RX ADMIN — Medication 2 MILLIGRAM(S): at 06:08

## 2018-04-05 RX ADMIN — Medication 1 TABLET(S): at 12:15

## 2018-04-05 RX ADMIN — Medication 1 GRAM(S): at 05:40

## 2018-04-05 RX ADMIN — DEXMEDETOMIDINE HYDROCHLORIDE IN 0.9% SODIUM CHLORIDE 4.39 MICROGRAM(S)/KG/HR: 4 INJECTION INTRAVENOUS at 17:37

## 2018-04-05 RX ADMIN — OXYCODONE AND ACETAMINOPHEN 1 TABLET(S): 5; 325 TABLET ORAL at 04:00

## 2018-04-05 RX ADMIN — OLANZAPINE 5 MILLIGRAM(S): 15 TABLET, FILM COATED ORAL at 18:12

## 2018-04-05 RX ADMIN — Medication 1.5 MILLIGRAM(S): at 16:28

## 2018-04-05 RX ADMIN — Medication 1 GRAM(S): at 00:42

## 2018-04-05 RX ADMIN — Medication 4 MILLIGRAM(S): at 16:46

## 2018-04-05 RX ADMIN — Medication 2 MILLIGRAM(S): at 12:42

## 2018-04-05 NOTE — PROGRESS NOTE ADULT - SUBJECTIVE AND OBJECTIVE BOX
Gastroenterology  Patient seen and examined bedside resting comfortably.  No complaints offered. Abdominal pain is well controlled.  Denies nausea and vomiting. Tolerating full liquid diet.  Normal flatus/BM.     T(F): 98.2 (04-05-18 @ 05:17), Max: 98.3 (04-04-18 @ 11:05)  HR: 86 (04-05-18 @ 05:17) (68 - 88)  BP: 141/93 (04-05-18 @ 05:17) (116/65 - 141/93)  RR: 17 (04-05-18 @ 05:17) (17 - 20)  SpO2: 97% (04-05-18 @ 05:17) (96% - 98%)  Wt(kg): --  CAPILLARY BLOOD GLUCOSE          PHYSICAL EXAM:  General: NAD, WDWN.   Neuro:  Alert & responsive  HEENT: NCAT, EOMI, conjunctiva clear  CV: +S1+S2 regular rate and rhythm  Lung: clear to ausculation bilaterally, respirations nonlabored, good inspiratory effort  Abdomen: soft, Non tender, No Distension. Normal active BS  Extremities: no pedal edema or calf tenderness noted     LABS:                        12.1   4.95  )-----------( 156      ( 04 Apr 2018 08:23 )             36.3     04-04    138  |  106  |  6<L>  ----------------------------<  90  3.7   |  26  |  0.74    Ca    7.3<L>      04 Apr 2018 08:23  Phos  1.7     04-04  Mg     1.7     04-04    TPro  6.3  /  Alb  3.1<L>  /  TBili  0.9  /  DBili  x   /  AST  27  /  ALT  31  /  AlkPhos  42  04-04    LIVER FUNCTIONS - ( 04 Apr 2018 08:23 )  Alb: 3.1 g/dL / Pro: 6.3 gm/dL / ALK PHOS: 42 U/L / ALT: 31 U/L / AST: 27 U/L / GGT: x           PT/INR - ( 03 Apr 2018 17:45 )   PT: 11.9 sec;   INR: 1.09 ratio         PTT - ( 03 Apr 2018 17:45 )  PTT:25.3 sec  I&O's Detail    04 Apr 2018 07:01  -  05 Apr 2018 07:00  --------------------------------------------------------  IN:    Oral Fluid: 500 mL    sodium chloride 0.9%.: 700 mL  Total IN: 1200 mL    OUT:    Voided: 500 mL  Total OUT: 500 mL    Total NET: 700 mL        04-04 @ 08:23    138 | 106 | 6  /7.3 | 1.7 | 1.7  _______________________/  3.7 | 26 | 0.74                           \par

## 2018-04-05 NOTE — CONSULT NOTE ADULT - SUBJECTIVE AND OBJECTIVE BOX
Called to see 53 Y/O male on CIWA protocol for agitation 2/2 ETOH withdrawal despite multiple doses of ativan.     HPI:  52 year old man with PMH gerd, HLD, etoh abuse presents with complaint of upper abdominal pain, nausea, and several episodes of emesis since yesterday morning.  He says he thinks he had EGD recently which was "ok" but he is not sure.  He is still drinking about 2 bottles of rum over a 3 day period every week.  He also says he is starting to feel tremors and has a headache.  He has not had a drink in about 2 days due to his abdominal pain.    In the ED, work up remarkable only for mild hypokalemia.  CT ab unchanged from previous, lactate initially elevated, normalized. (2018 03:16)      Allergies    No Known Allergies    Intolerances        MEDICATIONS  (STANDING):  atorvastatin 20 milliGRAM(s) Oral at bedtime  dexmedetomidine Infusion 0.2 MICROgram(s)/kG/Hr (4.39 mL/Hr) IV Continuous <Continuous>  FIRST- Mouthwash  BLM 5 milliLiter(s) Swish and Swallow every 8 hours  folic acid 1 milliGRAM(s) Oral daily  influenza   Vaccine 0.5 milliLiter(s) IntraMuscular once  LORazepam   Injectable   IV Push   LORazepam   Injectable 1.5 milliGRAM(s) IV Push every 4 hours  multivitamin 1 Tablet(s) Oral daily  OLANZapine Injectable 5 milliGRAM(s) IntraMuscular once  pantoprazole  Injectable 40 milliGRAM(s) IV Push two times a day  PHENobarbital Injectable 130 milliGRAM(s) IV Push once  sodium chloride 0.9%. 1000 milliLiter(s) (100 mL/Hr) IV Continuous <Continuous>  sucralfate suspension 1 Gram(s) Oral four times a day  thiamine 100 milliGRAM(s) Oral daily    MEDICATIONS  (PRN):  acetaminophen   Tablet. 650 milliGRAM(s) Oral every 6 hours PRN Mild Pain (1 - 3)  LORazepam   Injectable 2 milliGRAM(s) IV Push every 1 hour PRN CIWA-Ar score 8 or greater  ondansetron Injectable 8 milliGRAM(s) IV Push every 8 hours PRN Nausea and/or Vomiting  oxyCODONE    5 mG/acetaminophen 325 mG 1 Tablet(s) Oral every 4 hours PRN Moderate Pain (4 - 6)      Daily     Daily Weight in k.7 (2018 05:17)    Drug Dosing Weight  Height (cm): 170.18 (2018 13:52)  Weight (kg): 87.8 (2018 03:35)  BMI (kg/m2): 30.3 (2018 03:35)  BSA (m2): 1.99 (2018 03:35)    PAST MEDICAL & SURGICAL HISTORY:  Mixed hyperlipidemia  PUD (peptic ulcer disease)  No significant past surgical history      FAMILY HISTORY:  No pertinent family history in first degree relatives      SOCIAL HISTORY:    ADVANCE DIRECTIVES: [ ] Full Code [ ] DNR    REVIEW OF SYSTEMS:    CONSTITUTIONAL: No fever, weight loss, or fatigue  EYES: No eye pain, visual disturbances, or discharge  ENMT:  No difficulty hearing, tinnitus, vertigo; No sinus or throat pain  NECK: No pain or stiffness  BREASTS: No pain, masses, or nipple discharge  RESPIRATORY: No cough, wheezing, chills or hemoptysis; No shortness of breath  CARDIOVASCULAR: No chest pain, palpitations, dizziness, or leg swelling  GASTROINTESTINAL: No abdominal or epigastric pain. No nausea, vomiting, or hematemesis; No diarrhea or constipation. No melena or hematochezia.  GENITOURINARY: No dysuria, frequency, hematuria, or incontinence  NEUROLOGICAL: No headaches, memory loss, loss of strength, numbness, or tremors  SKIN: No itching, burning, rashes, or lesions   LYMPH NODES: No enlarged glands  ENDOCRINE: No heat or cold intolerance; No hair loss  MUSCULOSKELETAL: No joint pain or swelling; No muscle, back, or extremity pain  PSYCHIATRIC: No depression, anxiety, mood swings, or difficulty sleeping  HEME/LYMPH: No easy bruising, or bleeding gums  ALLERGY AND IMMUNOLOGIC: No hives or eczema      ICU Vital Signs Last 24 Hrs  T(C): 36.8 (2018 16:03), Max: 36.9 (2018 07:48)  T(F): 98.3 (2018 16:03), Max: 98.5 (2018 07:48)  HR: 103 (2018 16:00) (72 - 126)  BP: 132/85 (2018 16:00) (106/63 - 148/103)  BP(mean): 94 (2018 16:00) (94 - 94)  ABP: --  ABP(mean): --  RR: 20 (2018 16:00) (16 - 20)  SpO2: 99% (2018 16:00) (97% - 100%)      I&O's Detail    2018 07:01  -  2018 07:00  --------------------------------------------------------  IN:    Oral Fluid: 500 mL    sodium chloride 0.9%.: 700 mL  Total IN: 1200 mL    OUT:    Voided: 500 mL  Total OUT: 500 mL    Total NET: 700 mL      2018 07:01  -  2018 17:49  --------------------------------------------------------  IN:  Total IN: 0 mL    OUT:  Total OUT: 0 mL    Total NET: 0 mL      PHYSICAL EXAM:    GENERAL: NAD, well-groomed, well-developed  HEAD:  Atraumatic, Normocephalic  EYES: EOMI, PERRLA, conjunctiva and sclera clear  ENMT: No tonsillar erythema, exudates, or enlargement; Moist mucous membranes, Good dentition, No lesions  NECK: Supple, No JVD, Normal thyroid  NERVOUS SYSTEM:  Alert & Oriented X3, Good concentration; Motor Strength 5/5 B/L upper and lower extremities; DTRs 2+ intact and symmetric  CHEST/LUNG: Clear to percussion bilaterally; No rales, rhonchi, wheezing, or rubs  HEART: Regular rate and rhythm; No murmurs, rubs, or gallops  ABDOMEN: Soft, Nontender, Nondistended; Bowel sounds present  EXTREMITIES:  2+ Peripheral Pulses, No clubbing, cyanosis, or edema      LABS:  CBC Full  -  ( 2018 08:23 )  WBC Count : 4.95 K/uL  Hemoglobin : 12.1 g/dL  Hematocrit : 36.3 %  Platelet Count - Automated : 156 K/uL  Mean Cell Volume : 86.4 fl  Mean Cell Hemoglobin : 28.8 pg  Mean Cell Hemoglobin Concentration : 33.3 gm/dL  Auto Neutrophil # : x  Auto Lymphocyte # : x  Auto Monocyte # : x  Auto Eosinophil # : x  Auto Basophil # : x  Auto Neutrophil % : x  Auto Lymphocyte % : x  Auto Monocyte % : x  Auto Eosinophil % : x  Auto Basophil % : x        137  |  105  |  5<L>  ----------------------------<  131<H>  3.6   |  22  |  0.95    Ca    9.2      2018 13:57  Phos  2.1     -  Mg     2.0     -    TPro  6.3  /  Alb  3.1<L>  /  TBili  0.9  /  DBili  x   /  AST  27  /  ALT  31  /  AlkPhos  42  -    CAPILLARY BLOOD GLUCOSE          Urinalysis Basic - ( 2018 21:50 )    Color: Yellow / Appearance: Clear / S.010 / pH: x  Gluc: x / Ketone: Moderate  / Bili: Negative / Urobili: Negative mg/dL   Blood: x / Protein: 30 mg/dL / Nitrite: Negative   Leuk Esterase: Negative / RBC: 0-2 /HPF / WBC x   Sq Epi: x / Non Sq Epi: Few / Bacteria: Few          Culture Results:   <10,000 CFU/ml  Normal Urogenital heidi present ( @ 08:26)  Culture Results:   No growth to date. ( @ 23:36)  Culture Results:   No growth to date. ( @ 23:34)        CRITICAL CARE TIME SPENT: 60 min

## 2018-04-05 NOTE — PROGRESS NOTE ADULT - PROBLEM SELECTOR PLAN 3
PPI IV q12h  GI consult noted   had egd one year ago showing some concerns garcia.   advise patient to stop drinking alcohol as this will increase risks of cancer.    also to f/u with GI as outpt for  , unclear if patient had EGD as outpatient  Zofran PRN  IVF hydration

## 2018-04-05 NOTE — PROGRESS NOTE ADULT - SUBJECTIVE AND OBJECTIVE BOX
Patient is a 52y old  Male who presents with a chief complaint of abdominal pain, nausea/vomiting (2018 03:16)      OVERNIGHT EVENTS:      REVIEW OF SYSTEMS: denies chest pain/SOB, diaphoresis, no F/C, cough, dizziness, headache, blurry vision, nausea, vomiting, abdominal pain. Rest unremarkable     MEDICATIONS  (STANDING):  atorvastatin 20 milliGRAM(s) Oral at bedtime  FIRST- Mouthwash  BLM 5 milliLiter(s) Swish and Swallow every 8 hours  folic acid 1 milliGRAM(s) Oral daily  influenza   Vaccine 0.5 milliLiter(s) IntraMuscular once  LORazepam   Injectable   IV Push   LORazepam   Injectable 1.5 milliGRAM(s) IV Push every 4 hours  multivitamin 1 Tablet(s) Oral daily  pantoprazole  Injectable 40 milliGRAM(s) IV Push two times a day  sodium chloride 0.9%. 1000 milliLiter(s) (100 mL/Hr) IV Continuous <Continuous>  sucralfate suspension 1 Gram(s) Oral four times a day  thiamine 100 milliGRAM(s) Oral daily    MEDICATIONS  (PRN):  acetaminophen   Tablet. 650 milliGRAM(s) Oral every 6 hours PRN Mild Pain (1 - 3)  LORazepam   Injectable 2 milliGRAM(s) IV Push every 1 hour PRN CIWA-Ar score 8 or greater  ondansetron Injectable 8 milliGRAM(s) IV Push every 8 hours PRN Nausea and/or Vomiting  oxyCODONE    5 mG/acetaminophen 325 mG 1 Tablet(s) Oral every 4 hours PRN Moderate Pain (4 - 6)      Allergies    No Known Allergies    Intolerances        SUBJECTIVE: in bed in NAD, no acute events overnight     T(F): 98.5 (18 @ 07:48), Max: 98.5 (18 @ 07:48)  HR: 92 (18 @ 07:48) (68 - 92)  BP: 148/103 (18 @ 07:48) (116/65 - 148/103)  RR: 20 (18 @ 07:48) (17 - 20)  SpO2: 98% (18 @ 07:48) (96% - 98%)  Wt(kg): --    PHYSICAL EXAM:  GENERAL: NAD, well-groomed, well-developed  HEAD:  Atraumatic, Normocephalic  EYES: EOMI, PERRLA, conjunctiva and sclera clear  ENMT: No tonsillar erythema, exudates, or enlargement; Moist mucous membranes, Good dentition, No lesions  NECK: Supple, No JVD, Normal thyroid  CHEST/LUNG: Clear to  auscultation bilaterally; No rales, rhonchi, wheezing, or rubs  bilaterally  HEART: Regular rate and rhythm; No murmurs, rubs, or gallops  ABDOMEN: Soft, Nontender, Nondistended; Bowel sounds present  EXTREMITIES:  2+ Peripheral Pulses, No clubbing, cyanosis, or edema BL LE  LYMPH: No lymphadenopathy noted  SKIN: No rashes or lesions  NERVOUS SYSTEM:  Alert & Oriented X3, Good concentration; Motor Strength 5/5 B/L upper and lower extremities;   DTRs 2+ intact and symmetric, sensation intact BL    LABS:                        12.1   4.95  )-----------( 156      ( 2018 08:23 )             36.3     04-04    138  |  106  |  6<L>  ----------------------------<  90  3.7   |  26  |  0.74    Ca    7.3<L>      2018 08:23  Phos  1.7     04-04  Mg     1.7     04-04    TPro  6.3  /  Alb  3.1<L>  /  TBili  0.9  /  DBili  x   /  AST  27  /  ALT  31  /  AlkPhos  42  04-04    PT/INR - ( 2018 17:45 )   PT: 11.9 sec;   INR: 1.09 ratio         PTT - ( 2018 17:45 )  PTT:25.3 sec  Urinalysis Basic - ( 2018 21:50 )    Color: Yellow / Appearance: Clear / S.010 / pH: x  Gluc: x / Ketone: Moderate  / Bili: Negative / Urobili: Negative mg/dL   Blood: x / Protein: 30 mg/dL / Nitrite: Negative   Leuk Esterase: Negative / RBC: 0-2 /HPF / WBC x   Sq Epi: x / Non Sq Epi: Few / Bacteria: Few      Cultures;   CAPILLARY BLOOD GLUCOSE        Lipid panel:           RADIOLOGY & ADDITIONAL TESTS:      Imaging Personally Reviewed:  [ x] YES      Consultant(s) Notes Reviewed:  [x ] YES     Care Discussed with [x ] Consultants [X ] Patient [x ] Family  [x ]    [x ]  Other; RN Patient is a 52y old  Male who presents with a chief complaint of abdominal pain, nausea/vomiting (2018 03:16)      OVERNIGHT EVENTS:   code gray this am    still even in presence of family confused keeps walking about   has received 10 mg ativan  total since 6am       REVIEW OF SYSTEMS: denies chest pain/SOB, diaphoresis, no F/C, cough, dizziness, headache, blurry vision, nausea, vomiting, abdominal pain. Rest unremarkable     MEDICATIONS  (STANDING):  atorvastatin 20 milliGRAM(s) Oral at bedtime  FIRST- Mouthwash  BLM 5 milliLiter(s) Swish and Swallow every 8 hours  folic acid 1 milliGRAM(s) Oral daily  influenza   Vaccine 0.5 milliLiter(s) IntraMuscular once  LORazepam   Injectable   IV Push   LORazepam   Injectable 1.5 milliGRAM(s) IV Push every 4 hours  multivitamin 1 Tablet(s) Oral daily  pantoprazole  Injectable 40 milliGRAM(s) IV Push two times a day  sodium chloride 0.9%. 1000 milliLiter(s) (100 mL/Hr) IV Continuous <Continuous>  sucralfate suspension 1 Gram(s) Oral four times a day  thiamine 100 milliGRAM(s) Oral daily    MEDICATIONS  (PRN):  acetaminophen   Tablet. 650 milliGRAM(s) Oral every 6 hours PRN Mild Pain (1 - 3)  LORazepam   Injectable 2 milliGRAM(s) IV Push every 1 hour PRN CIWA-Ar score 8 or greater  ondansetron Injectable 8 milliGRAM(s) IV Push every 8 hours PRN Nausea and/or Vomiting  oxyCODONE    5 mG/acetaminophen 325 mG 1 Tablet(s) Oral every 4 hours PRN Moderate Pain (4 - 6)      Allergies    No Known Allergies    Intolerances        SUBJECTIVE: in bed in NAD, no acute events overnight     T(F): 98.5 (18 @ 07:48), Max: 98.5 (18 @ 07:48)  HR: 92 (18 @ 07:48) (68 - 92)  BP: 148/103 (18 @ 07:48) (116/65 - 148/103)  RR: 20 (18 @ 07:48) (17 - 20)  SpO2: 98% (18 @ 07:48) (96% - 98%)  Wt(kg): --    PHYSICAL EXAM:  GENERAL: NAD, well-groomed, well-developed  HEAD:  Atraumatic, Normocephalic  EYES: EOMI, PERRLA, conjunctiva and sclera clear  ENMT: No tonsillar erythema, exudates, or enlargement; Moist mucous membranes, Good dentition, No lesions  NECK: Supple, No JVD, Normal thyroid  CHEST/LUNG: Clear to  auscultation bilaterally; No rales, rhonchi, wheezing, or rubs  bilaterally  HEART: Regular rate and rhythm; No murmurs, rubs, or gallops  ABDOMEN: Soft, Nontender, Nondistended; Bowel sounds present  EXTREMITIES:  2+ Peripheral Pulses, No clubbing, cyanosis, or edema BL LE    SKIN: No rashes or lesions  NERVOUS SYSTEM:  Awake disoriented confused    DTRs 2+ intact and symmetric, sensation intact BL    LABS:                        12.1   4.95  )-----------( 156      ( 2018 08:23 )             36.3     04-04    138  |  106  |  6<L>  ----------------------------<  90  3.7   |  26  |  0.74    Ca    7.3<L>      2018 08:23  Phos  1.7     04-04  Mg     1.7     04-04    TPro  6.3  /  Alb  3.1<L>  /  TBili  0.9  /  DBili  x   /  AST  27  /  ALT  31  /  AlkPhos  42  04-04    PT/INR - ( 2018 17:45 )   PT: 11.9 sec;   INR: 1.09 ratio         PTT - ( 2018 17:45 )  PTT:25.3 sec  Urinalysis Basic - ( 2018 21:50 )    Color: Yellow / Appearance: Clear / S.010 / pH: x  Gluc: x / Ketone: Moderate  / Bili: Negative / Urobili: Negative mg/dL   Blood: x / Protein: 30 mg/dL / Nitrite: Negative   Leuk Esterase: Negative / RBC: 0-2 /HPF / WBC x   Sq Epi: x / Non Sq Epi: Few / Bacteria: Few      Cultures;   CAPILLARY BLOOD GLUCOSE        Lipid panel:           RADIOLOGY & ADDITIONAL TESTS:      Imaging Personally Reviewed:  [ x] YES      Consultant(s) Notes Reviewed:  [x ] YES     Care Discussed with [x ] Consultants [X ] Patient [x ] Family  [x ]    [x ]  Other; RN

## 2018-04-05 NOTE — CONSULT NOTE ADULT - ASSESSMENT
53 Y/O male s/p code gray on CIWA protocol who continues to be agitated and confused despite multiple doses of ativan.     1. Continue CIWA protocol with additional doses of ativan PRN  2. Consider adding precedex, haldol, phenobarb, and or zyprexa as needed.  3. Continue MV, thiamine, folic acid, IVF  4. Repeat labs, replace electrolytes as needed  5. Transfer pt to CCU for further monitoring as pt needs increasing doses of medications for agitation. Discussed above w/ Dr. Akbar.

## 2018-04-05 NOTE — CHART NOTE - NSCHARTNOTEFT_GEN_A_CORE
Medicine Hospitalist PAULA Perez called for CIWA 7, pt got dressed up ready to leave. Pt confused. Pt seen and examined, seems restless and states he wants to walk over to [place] to clear out his insurance issues. Explained to pt he is in the hospital and that he can talk to social work. Pt confused, still adamant at leaving, explained all facilities are closed at this time and that he can mot leave alone. Somewhat understanding but bc pt is restless and mild tremors will give Ativan 2mg IVP once. Continue CIWA and Ativan protocol for etoh abuse. Continue to monitor. Medicine Hospitalist PAULA Perez called for CIWA 7, pt got dressed up ready to leave. Pt confused. Pt seen and examined, seems restless and states he wants to walk over to [place] to clear out his insurance issues. Explained to pt he is in the hospital and that he can talk to social work. Pt confused, still adamant at leaving, explained all facilities are closed at this time and that he can mot leave alone. Somewhat understanding but bc pt is restless and mild tremors will give Ativan 2mg IVP once. Continue CIWA and Ativan protocol for etoh abuse. Continue to monitor.    Continue to be agitated, wondering in hallways. Constant observation initiated. Called daughter and wife, pt spoke to wife.

## 2018-04-05 NOTE — PROGRESS NOTE ADULT - PROBLEM SELECTOR PLAN 1
continue with ciwa protocol  MV, thiamine, folic acid continue with ciwa protocol for   MV, thiamine, folic acid   has received 10mg Ativan since 6am   consulted ICU and has been accepted for xfer .

## 2018-04-06 DIAGNOSIS — K29.20 ALCOHOLIC GASTRITIS WITHOUT BLEEDING: ICD-10-CM

## 2018-04-06 LAB
ALBUMIN SERPL ELPH-MCNC: 3.6 G/DL — SIGNIFICANT CHANGE UP (ref 3.3–5)
ALP SERPL-CCNC: 44 U/L — SIGNIFICANT CHANGE UP (ref 40–120)
ALT FLD-CCNC: 48 U/L — SIGNIFICANT CHANGE UP (ref 12–78)
ANION GAP SERPL CALC-SCNC: 8 MMOL/L — SIGNIFICANT CHANGE UP (ref 5–17)
AST SERPL-CCNC: 49 U/L — HIGH (ref 15–37)
BASOPHILS # BLD AUTO: 0.03 K/UL — SIGNIFICANT CHANGE UP (ref 0–0.2)
BASOPHILS NFR BLD AUTO: 0.7 % — SIGNIFICANT CHANGE UP (ref 0–2)
BILIRUB SERPL-MCNC: 1 MG/DL — SIGNIFICANT CHANGE UP (ref 0.2–1.2)
BUN SERPL-MCNC: 7 MG/DL — SIGNIFICANT CHANGE UP (ref 7–23)
CALCIUM SERPL-MCNC: 8.9 MG/DL — SIGNIFICANT CHANGE UP (ref 8.5–10.1)
CHLORIDE SERPL-SCNC: 109 MMOL/L — HIGH (ref 96–108)
CO2 SERPL-SCNC: 24 MMOL/L — SIGNIFICANT CHANGE UP (ref 22–31)
CREAT SERPL-MCNC: 0.76 MG/DL — SIGNIFICANT CHANGE UP (ref 0.5–1.3)
EOSINOPHIL # BLD AUTO: 0.17 K/UL — SIGNIFICANT CHANGE UP (ref 0–0.5)
EOSINOPHIL NFR BLD AUTO: 4.2 % — SIGNIFICANT CHANGE UP (ref 0–6)
GLUCOSE SERPL-MCNC: 97 MG/DL — SIGNIFICANT CHANGE UP (ref 70–99)
HCT VFR BLD CALC: 40.6 % — SIGNIFICANT CHANGE UP (ref 39–50)
HGB BLD-MCNC: 13.3 G/DL — SIGNIFICANT CHANGE UP (ref 13–17)
IMM GRANULOCYTES NFR BLD AUTO: 0.5 % — SIGNIFICANT CHANGE UP (ref 0–1.5)
LYMPHOCYTES # BLD AUTO: 1.46 K/UL — SIGNIFICANT CHANGE UP (ref 1–3.3)
LYMPHOCYTES # BLD AUTO: 35.9 % — SIGNIFICANT CHANGE UP (ref 13–44)
MAGNESIUM SERPL-MCNC: 2.1 MG/DL — SIGNIFICANT CHANGE UP (ref 1.6–2.6)
MCHC RBC-ENTMCNC: 28.3 PG — SIGNIFICANT CHANGE UP (ref 27–34)
MCHC RBC-ENTMCNC: 32.8 GM/DL — SIGNIFICANT CHANGE UP (ref 32–36)
MCV RBC AUTO: 86.4 FL — SIGNIFICANT CHANGE UP (ref 80–100)
MONOCYTES # BLD AUTO: 0.48 K/UL — SIGNIFICANT CHANGE UP (ref 0–0.9)
MONOCYTES NFR BLD AUTO: 11.8 % — SIGNIFICANT CHANGE UP (ref 2–14)
NEUTROPHILS # BLD AUTO: 1.91 K/UL — SIGNIFICANT CHANGE UP (ref 1.8–7.4)
NEUTROPHILS NFR BLD AUTO: 46.9 % — SIGNIFICANT CHANGE UP (ref 43–77)
NRBC # BLD: 0 /100 WBCS — SIGNIFICANT CHANGE UP (ref 0–0)
PHOSPHATE SERPL-MCNC: 3.6 MG/DL — SIGNIFICANT CHANGE UP (ref 2.5–4.5)
PLATELET # BLD AUTO: 179 K/UL — SIGNIFICANT CHANGE UP (ref 150–400)
POTASSIUM SERPL-MCNC: 3.5 MMOL/L — SIGNIFICANT CHANGE UP (ref 3.5–5.3)
POTASSIUM SERPL-SCNC: 3.5 MMOL/L — SIGNIFICANT CHANGE UP (ref 3.5–5.3)
PROT SERPL-MCNC: 7.4 GM/DL — SIGNIFICANT CHANGE UP (ref 6–8.3)
RBC # BLD: 4.7 M/UL — SIGNIFICANT CHANGE UP (ref 4.2–5.8)
RBC # FLD: 12.5 % — SIGNIFICANT CHANGE UP (ref 10.3–14.5)
SODIUM SERPL-SCNC: 141 MMOL/L — SIGNIFICANT CHANGE UP (ref 135–145)
WBC # BLD: 4.07 K/UL — SIGNIFICANT CHANGE UP (ref 3.8–10.5)
WBC # FLD AUTO: 4.07 K/UL — SIGNIFICANT CHANGE UP (ref 3.8–10.5)

## 2018-04-06 PROCEDURE — 99233 SBSQ HOSP IP/OBS HIGH 50: CPT

## 2018-04-06 RX ORDER — HALOPERIDOL DECANOATE 100 MG/ML
5 INJECTION INTRAMUSCULAR ONCE
Qty: 0 | Refills: 0 | Status: DISCONTINUED | OUTPATIENT
Start: 2018-04-06 | End: 2018-04-06

## 2018-04-06 RX ORDER — PHENOBARBITAL 60 MG
130 TABLET ORAL ONCE
Qty: 0 | Refills: 0 | Status: DISCONTINUED | OUTPATIENT
Start: 2018-04-06 | End: 2018-04-06

## 2018-04-06 RX ORDER — PHENOBARBITAL 60 MG
65 TABLET ORAL EVERY 4 HOURS
Qty: 0 | Refills: 0 | Status: DISCONTINUED | OUTPATIENT
Start: 2018-04-06 | End: 2018-04-08

## 2018-04-06 RX ORDER — DIPHENHYDRAMINE HCL 50 MG
25 CAPSULE ORAL ONCE
Qty: 0 | Refills: 0 | Status: DISCONTINUED | OUTPATIENT
Start: 2018-04-06 | End: 2018-04-11

## 2018-04-06 RX ORDER — HEPARIN SODIUM 5000 [USP'U]/ML
5000 INJECTION INTRAVENOUS; SUBCUTANEOUS EVERY 12 HOURS
Qty: 0 | Refills: 0 | Status: DISCONTINUED | OUTPATIENT
Start: 2018-04-06 | End: 2018-04-11

## 2018-04-06 RX ADMIN — HEPARIN SODIUM 5000 UNIT(S): 5000 INJECTION INTRAVENOUS; SUBCUTANEOUS at 18:08

## 2018-04-06 RX ADMIN — PANTOPRAZOLE SODIUM 40 MILLIGRAM(S): 20 TABLET, DELAYED RELEASE ORAL at 05:34

## 2018-04-06 RX ADMIN — Medication 1 MILLIGRAM(S): at 05:35

## 2018-04-06 RX ADMIN — DEXMEDETOMIDINE HYDROCHLORIDE IN 0.9% SODIUM CHLORIDE 4.39 MICROGRAM(S)/KG/HR: 4 INJECTION INTRAVENOUS at 12:00

## 2018-04-06 RX ADMIN — ATORVASTATIN CALCIUM 20 MILLIGRAM(S): 80 TABLET, FILM COATED ORAL at 22:54

## 2018-04-06 RX ADMIN — DIPHENHYDRAMINE HYDROCHLORIDE AND LIDOCAINE HYDROCHLORIDE AND ALUMINUM HYDROXIDE AND MAGNESIUM HYDRO 5 MILLILITER(S): KIT at 00:29

## 2018-04-06 RX ADMIN — Medication 1 MILLIGRAM(S): at 22:54

## 2018-04-06 RX ADMIN — Medication 130 MILLIGRAM(S): at 11:09

## 2018-04-06 RX ADMIN — Medication 1 MILLIGRAM(S): at 03:00

## 2018-04-06 RX ADMIN — DEXMEDETOMIDINE HYDROCHLORIDE IN 0.9% SODIUM CHLORIDE 4.39 MICROGRAM(S)/KG/HR: 4 INJECTION INTRAVENOUS at 09:21

## 2018-04-06 RX ADMIN — DEXMEDETOMIDINE HYDROCHLORIDE IN 0.9% SODIUM CHLORIDE 4.39 MICROGRAM(S)/KG/HR: 4 INJECTION INTRAVENOUS at 20:30

## 2018-04-06 RX ADMIN — Medication 1 GRAM(S): at 18:20

## 2018-04-06 RX ADMIN — Medication 2 MILLIGRAM(S): at 09:16

## 2018-04-06 RX ADMIN — Medication 1.5 MILLIGRAM(S): at 00:39

## 2018-04-06 RX ADMIN — DEXMEDETOMIDINE HYDROCHLORIDE IN 0.9% SODIUM CHLORIDE 4.39 MICROGRAM(S)/KG/HR: 4 INJECTION INTRAVENOUS at 04:00

## 2018-04-06 RX ADMIN — DIPHENHYDRAMINE HYDROCHLORIDE AND LIDOCAINE HYDROCHLORIDE AND ALUMINUM HYDROXIDE AND MAGNESIUM HYDRO 5 MILLILITER(S): KIT at 22:54

## 2018-04-06 RX ADMIN — Medication 1 MILLIGRAM(S): at 18:09

## 2018-04-06 RX ADMIN — PANTOPRAZOLE SODIUM 40 MILLIGRAM(S): 20 TABLET, DELAYED RELEASE ORAL at 18:08

## 2018-04-06 RX ADMIN — SODIUM CHLORIDE 100 MILLILITER(S): 9 INJECTION, SOLUTION INTRAVENOUS at 06:01

## 2018-04-06 RX ADMIN — DIPHENHYDRAMINE HYDROCHLORIDE AND LIDOCAINE HYDROCHLORIDE AND ALUMINUM HYDROXIDE AND MAGNESIUM HYDRO 5 MILLILITER(S): KIT at 05:32

## 2018-04-06 RX ADMIN — Medication 1 MILLIGRAM(S): at 10:16

## 2018-04-06 NOTE — DIETITIAN INITIAL EVALUATION ADULT. - NS AS NUTRI INTERV ED CONTENT
[facility] nutrition therapy left @ bedside/Purpose of the nutrition education/Survival information

## 2018-04-06 NOTE — PROGRESS NOTE ADULT - SUBJECTIVE AND OBJECTIVE BOX
Gastroenterology  Patient seen and examined bedside resting comfortably.  No complaints offered. EVENTS NOTED   No abdominal pain  Denies nausea and vomiting. Tolerating diet.  Normal flatus/BM.     T(F): 98.4 (04-06-18 @ 11:41), Max: 99.2 (04-05-18 @ 20:00)  HR: 55 (04-06-18 @ 17:00) (47 - 102)  BP: 136/90 (04-06-18 @ 17:00) (109/77 - 161/96)  RR: 15 (04-06-18 @ 17:00) (7 - 31)  SpO2: 98% (04-06-18 @ 17:00) (98% - 100%)  Wt(kg): --  CAPILLARY BLOOD GLUCOSE          PHYSICAL EXAM:  General: NAD, WDWN.   Neuro:  Alert & oriented x 3  HEENT: NCAT, EOMI, conjunctiva clear  CV: +S1+S2 regular rate and rhythm  Lung: clear to ausculation bilaterally, respirations nonlabored, good inspiratory effort  Abdomen: soft, NonTender, No distention Normal active BS  Extremities: no cyanosis, clubbing or edema    LABS:                        13.3   4.07  )-----------( 179      ( 06 Apr 2018 04:23 )             40.6     04-06    141  |  109<H>  |  7   ----------------------------<  97  3.5   |  24  |  0.76    Ca    8.9      06 Apr 2018 04:23  Phos  3.6     04-06  Mg     2.1     04-06    TPro  7.4  /  Alb  3.6  /  TBili  1.0  /  DBili  x   /  AST  49<H>  /  ALT  48  /  AlkPhos  44  04-06    LIVER FUNCTIONS - ( 06 Apr 2018 04:23 )  Alb: 3.6 g/dL / Pro: 7.4 gm/dL / ALK PHOS: 44 U/L / ALT: 48 U/L / AST: 49 U/L / GGT: x             I&O's Detail    05 Apr 2018 07:01  -  06 Apr 2018 07:00  --------------------------------------------------------  IN:    dexmedetomidine Infusion: 140.4 mL    lactated ringers.: 1200 mL    Oral Fluid: 240 mL  Total IN: 1580.4 mL    OUT:    Voided: 40 mL  Total OUT: 40 mL    Total NET: 1540.4 mL      06 Apr 2018 07:01  -  06 Apr 2018 18:35  --------------------------------------------------------  IN:    dexmedetomidine Infusion: 37 mL    lactated ringers.: 400 mL  Total IN: 437 mL    OUT:  Total OUT: 0 mL    Total NET: 437 mL        04-06 @ 04:23    141 | 109 | 7  /8.9 | 2.1 | 3.6  _______________________/  3.5 | 24 | 0.76                           \par

## 2018-04-06 NOTE — DIETITIAN INITIAL EVALUATION ADULT. - OTHER INFO
Pt seen today due to admission to CCU/Withdrawal. Unable to obtain usual diet/weight hx as no family at bedside. Pt holding hand to throat expressive of pain.  PTA pt was consuming Rum x 3 days /week every week. Current diet of low sodium w/ poor p.o. intake reflective of < 25% consumed, pt refuses to eat & drink. Observed pt on meal rounds yesterday with 0% consumed and spoke English to this Clinician.

## 2018-04-06 NOTE — DIETITIAN INITIAL EVALUATION ADULT. - PERTINENT LABORATORY DATA
04-06 Na141 mmol/L Glu 97 mg/dL K+ 3.5 mmol/L Cr  0.76 mg/dL BUN 7 mg/dL 04-06 Phos 3.6 mg/dL 04-06 Alb 3.6 g/dL04-06 ALT 48 U/L AST 49 U/L<H> Alkaline Phosphatase 44 U/L

## 2018-04-06 NOTE — DIETITIAN INITIAL EVALUATION ADULT. - PERTINENT MEDS FT
lactated ringers @ 100 ml/hr, ativan, precedex, lipitor, folic acid, MVI, protonix, carafate, thiamin

## 2018-04-06 NOTE — PROGRESS NOTE ADULT - SUBJECTIVE AND OBJECTIVE BOX
51 yo M hx gerd, etoh abuse admitted with abd pain and etoh withdrawals and tx to ICU for worsening withdrawals.     Pt on precedex and ativan and now requiring phenobarb as well for significant agitation.  qtc 500 this morning    pt disoriented and intermittently agitated and getting out of bed    ROS  Unable as pt very confused                          13.3   4.07  )-----------( 179      ( 06 Apr 2018 04:23 )             40.6   04-06    141  |  109<H>  |  7   ----------------------------<  97  3.5   |  24  |  0.76    Ca    8.9      06 Apr 2018 04:23  Phos  3.6     04-06  Mg     2.1     04-06    TPro  7.4  /  Alb  3.6  /  TBili  1.0  /  DBili  x   /  AST  49<H>  /  ALT  48  /  AlkPhos  44  04-06    MEDICATIONS  (STANDING):  atorvastatin 20 milliGRAM(s) Oral at bedtime  dexmedetomidine Infusion 0.2 MICROgram(s)/kG/Hr (4.39 mL/Hr) IV Continuous <Continuous>  diphenhydrAMINE   Injectable 25 milliGRAM(s) IV Push once  FIRST- Mouthwash  BLM 5 milliLiter(s) Swish and Swallow every 8 hours  folic acid 1 milliGRAM(s) Oral daily  influenza   Vaccine 0.5 milliLiter(s) IntraMuscular once  lactated ringers. 1000 milliLiter(s) (100 mL/Hr) IV Continuous <Continuous>  LORazepam   Injectable   IV Push   LORazepam   Injectable 1 milliGRAM(s) IV Push every 4 hours  multivitamin 1 Tablet(s) Oral daily  pantoprazole  Injectable 40 milliGRAM(s) IV Push two times a day  sucralfate suspension 1 Gram(s) Oral four times a day  thiamine 100 milliGRAM(s) Oral daily    ICU Vital Signs Last 24 Hrs  T(C): 36.9 (06 Apr 2018 11:41), Max: 37.3 (05 Apr 2018 20:00)  T(F): 98.4 (06 Apr 2018 11:41), Max: 99.2 (05 Apr 2018 20:00)  HR: 49 (06 Apr 2018 13:30) (47 - 118)  BP: 145/92 (06 Apr 2018 13:00) (109/77 - 156/93)  BP(mean): 104 (06 Apr 2018 13:00) (84 - 114)  ABP: --  ABP(mean): --  RR: 16 (06 Apr 2018 13:30) (7 - 31)  SpO2: 100% (06 Apr 2018 13:30) (96% - 100%)    disoriented and intermittently agitated  arash  moist membranes  nek no LAD  b/l cta  s1s2 reg no murmur  soft nontender +BS  no edema  moving all extremities

## 2018-04-06 NOTE — DIETITIAN INITIAL EVALUATION ADULT. - NS AS NUTRI INTERV COLLABORAT
Collaboration with other providers/Recommend: swallow eval for appropriate texture due to esophagitis

## 2018-04-07 LAB
ANION GAP SERPL CALC-SCNC: 8 MMOL/L — SIGNIFICANT CHANGE UP (ref 5–17)
BUN SERPL-MCNC: 9 MG/DL — SIGNIFICANT CHANGE UP (ref 7–23)
CALCIUM SERPL-MCNC: 8.5 MG/DL — SIGNIFICANT CHANGE UP (ref 8.5–10.1)
CHLORIDE SERPL-SCNC: 106 MMOL/L — SIGNIFICANT CHANGE UP (ref 96–108)
CO2 SERPL-SCNC: 25 MMOL/L — SIGNIFICANT CHANGE UP (ref 22–31)
CREAT SERPL-MCNC: 0.76 MG/DL — SIGNIFICANT CHANGE UP (ref 0.5–1.3)
GLUCOSE SERPL-MCNC: 92 MG/DL — SIGNIFICANT CHANGE UP (ref 70–99)
HCT VFR BLD CALC: 41.7 % — SIGNIFICANT CHANGE UP (ref 39–50)
HGB BLD-MCNC: 13.7 G/DL — SIGNIFICANT CHANGE UP (ref 13–17)
MAGNESIUM SERPL-MCNC: 1.8 MG/DL — SIGNIFICANT CHANGE UP (ref 1.6–2.6)
MCHC RBC-ENTMCNC: 28.2 PG — SIGNIFICANT CHANGE UP (ref 27–34)
MCHC RBC-ENTMCNC: 32.9 GM/DL — SIGNIFICANT CHANGE UP (ref 32–36)
MCV RBC AUTO: 86 FL — SIGNIFICANT CHANGE UP (ref 80–100)
NRBC # BLD: 0 /100 WBCS — SIGNIFICANT CHANGE UP (ref 0–0)
PHOSPHATE SERPL-MCNC: 3 MG/DL — SIGNIFICANT CHANGE UP (ref 2.5–4.5)
PLATELET # BLD AUTO: 185 K/UL — SIGNIFICANT CHANGE UP (ref 150–400)
POTASSIUM SERPL-MCNC: 3.4 MMOL/L — LOW (ref 3.5–5.3)
POTASSIUM SERPL-SCNC: 3.4 MMOL/L — LOW (ref 3.5–5.3)
RBC # BLD: 4.85 M/UL — SIGNIFICANT CHANGE UP (ref 4.2–5.8)
RBC # FLD: 12.4 % — SIGNIFICANT CHANGE UP (ref 10.3–14.5)
SODIUM SERPL-SCNC: 139 MMOL/L — SIGNIFICANT CHANGE UP (ref 135–145)
WBC # BLD: 5.2 K/UL — SIGNIFICANT CHANGE UP (ref 3.8–10.5)
WBC # FLD AUTO: 5.2 K/UL — SIGNIFICANT CHANGE UP (ref 3.8–10.5)

## 2018-04-07 PROCEDURE — 99232 SBSQ HOSP IP/OBS MODERATE 35: CPT

## 2018-04-07 RX ORDER — POTASSIUM CHLORIDE 20 MEQ
40 PACKET (EA) ORAL ONCE
Qty: 0 | Refills: 0 | Status: COMPLETED | OUTPATIENT
Start: 2018-04-07 | End: 2018-04-07

## 2018-04-07 RX ORDER — POTASSIUM CHLORIDE 20 MEQ
20 PACKET (EA) ORAL ONCE
Qty: 0 | Refills: 0 | Status: DISCONTINUED | OUTPATIENT
Start: 2018-04-07 | End: 2018-04-07

## 2018-04-07 RX ORDER — PANTOPRAZOLE SODIUM 20 MG/1
40 TABLET, DELAYED RELEASE ORAL
Qty: 0 | Refills: 0 | Status: DISCONTINUED | OUTPATIENT
Start: 2018-04-07 | End: 2018-04-11

## 2018-04-07 RX ADMIN — DIPHENHYDRAMINE HYDROCHLORIDE AND LIDOCAINE HYDROCHLORIDE AND ALUMINUM HYDROXIDE AND MAGNESIUM HYDRO 5 MILLILITER(S): KIT at 23:58

## 2018-04-07 RX ADMIN — DEXMEDETOMIDINE HYDROCHLORIDE IN 0.9% SODIUM CHLORIDE 4.39 MICROGRAM(S)/KG/HR: 4 INJECTION INTRAVENOUS at 06:39

## 2018-04-07 RX ADMIN — Medication 40 MILLIEQUIVALENT(S): at 06:40

## 2018-04-07 RX ADMIN — Medication 0.5 MILLIGRAM(S): at 01:37

## 2018-04-07 RX ADMIN — Medication 1 GRAM(S): at 23:59

## 2018-04-07 RX ADMIN — DIPHENHYDRAMINE HYDROCHLORIDE AND LIDOCAINE HYDROCHLORIDE AND ALUMINUM HYDROXIDE AND MAGNESIUM HYDRO 5 MILLILITER(S): KIT at 06:40

## 2018-04-07 RX ADMIN — PANTOPRAZOLE SODIUM 40 MILLIGRAM(S): 20 TABLET, DELAYED RELEASE ORAL at 17:43

## 2018-04-07 RX ADMIN — Medication 1 MILLIGRAM(S): at 11:51

## 2018-04-07 RX ADMIN — Medication 0.5 MILLIGRAM(S): at 14:54

## 2018-04-07 RX ADMIN — SODIUM CHLORIDE 100 MILLILITER(S): 9 INJECTION, SOLUTION INTRAVENOUS at 01:38

## 2018-04-07 RX ADMIN — PANTOPRAZOLE SODIUM 40 MILLIGRAM(S): 20 TABLET, DELAYED RELEASE ORAL at 06:40

## 2018-04-07 RX ADMIN — Medication 1 TABLET(S): at 11:51

## 2018-04-07 RX ADMIN — Medication 100 MILLIGRAM(S): at 11:51

## 2018-04-07 RX ADMIN — Medication 0.5 MILLIGRAM(S): at 10:05

## 2018-04-07 RX ADMIN — Medication 0.5 MILLIGRAM(S): at 23:58

## 2018-04-07 RX ADMIN — HEPARIN SODIUM 5000 UNIT(S): 5000 INJECTION INTRAVENOUS; SUBCUTANEOUS at 06:40

## 2018-04-07 RX ADMIN — Medication 1 GRAM(S): at 11:51

## 2018-04-07 RX ADMIN — Medication 1 GRAM(S): at 01:37

## 2018-04-07 RX ADMIN — DIPHENHYDRAMINE HYDROCHLORIDE AND LIDOCAINE HYDROCHLORIDE AND ALUMINUM HYDROXIDE AND MAGNESIUM HYDRO 5 MILLILITER(S): KIT at 14:54

## 2018-04-07 RX ADMIN — Medication 0.5 MILLIGRAM(S): at 06:39

## 2018-04-07 RX ADMIN — Medication 0.5 MILLIGRAM(S): at 17:43

## 2018-04-07 RX ADMIN — Medication 1 GRAM(S): at 17:43

## 2018-04-07 RX ADMIN — HEPARIN SODIUM 5000 UNIT(S): 5000 INJECTION INTRAVENOUS; SUBCUTANEOUS at 17:43

## 2018-04-07 RX ADMIN — ATORVASTATIN CALCIUM 20 MILLIGRAM(S): 80 TABLET, FILM COATED ORAL at 23:58

## 2018-04-07 RX ADMIN — Medication 1 GRAM(S): at 06:40

## 2018-04-07 NOTE — PROGRESS NOTE ADULT - SUBJECTIVE AND OBJECTIVE BOX
Gastroenterology  Patient seen and examined bedside resting comfortably.  No complaints offered. Abdominal pain is well controlled.  Denies nausea and vomiting. Tolerating diet.  Normal flatus/BM.     T(F): 97.2 (04-07-18 @ 08:00), Max: 98.2 (04-07-18 @ 04:00)  HR: 106 (04-07-18 @ 10:00) (47 - 106)  BP: 102/66 (04-07-18 @ 10:00) (102/66 - 161/96)  RR: 20 (04-07-18 @ 10:00) (12 - 22)  SpO2: 97% (04-07-18 @ 09:00) (96% - 100%)  Wt(kg): --  CAPILLARY BLOOD GLUCOSE    PHYSICAL EXAM:  General: NAD, WDWN.   Neuro:  Alert & responsive  HEENT: NCAT, EOMI, conjunctiva clear  CV: +S1+S2 regular rate and rhythm  Lung: clear to ausculation bilaterally, respirations nonlabored, good inspiratory effort  Abdomen: soft, Non tender, No Distension. Normal active BS  Extremities: no pedal edema or calf tenderness noted     LABS:                        13.7   5.20  )-----------( 185      ( 07 Apr 2018 04:45 )             41.7     04-07    139  |  106  |  9   ----------------------------<  92  3.4<L>   |  25  |  0.76    Ca    8.5      07 Apr 2018 04:45  Phos  3.0     04-07  Mg     1.8     04-07    TPro  7.4  /  Alb  3.6  /  TBili  1.0  /  DBili  x   /  AST  49<H>  /  ALT  48  /  AlkPhos  44  04-06    LIVER FUNCTIONS - ( 06 Apr 2018 04:23 )  Alb: 3.6 g/dL / Pro: 7.4 gm/dL / ALK PHOS: 44 U/L / ALT: 48 U/L / AST: 49 U/L / GGT: x             I&O's Detail    06 Apr 2018 07:01  -  07 Apr 2018 07:00  --------------------------------------------------------  IN:    dexmedetomidine Infusion: 170 mL    lactated ringers.: 2100 mL    Oral Fluid: 237 mL  Total IN: 2507 mL    OUT:    Voided: 1125 mL  Total OUT: 1125 mL    Total NET: 1382 mL      07 Apr 2018 07:01  -  07 Apr 2018 12:29  --------------------------------------------------------  IN:    dexmedetomidine Infusion: 9.9 mL    lactated ringers.: 300 mL    Oral Fluid: 240 mL  Total IN: 549.9 mL    OUT:  Total OUT: 0 mL    Total NET: 549.9 mL        04-07 @ 04:45    139 | 106 | 9  /8.5 | 1.8 | 3.0  _______________________/  3.4 | 25 | 0.76                           \par

## 2018-04-07 NOTE — CHART NOTE - NSCHARTNOTEFT_GEN_A_CORE
Pt medically stable for transfer to medical floor.     51 yo M hx gerd, etoh abuse admitted with abd pain and etoh withdrawals and tx to ICU for worsening withdrawals requiring precedex infusion.  Pt seen by GI for abdominal pain.  No intervention at this time. Will continue protonix PO bid and carafate and plan EGD as outpatient.  Pt seen this AM on rounds.  Pt is calm and cooperative.  Will need to complete ativan CIWA protocol. Pt medically stable for transfer to medical floor.  Signed out to Dr. Kingsley    51 yo M hx gerd, etoh abuse admitted with abd pain and etoh withdrawals and tx to ICU for worsening withdrawals requiring precedex infusion.  Pt seen by GI for abdominal pain.  No intervention at this time. Will continue protonix PO bid and carafate and plan EGD as outpatient.  Pt seen this AM on rounds.  Pt is calm and cooperative.  Will need to complete ativan CIWA protocol.

## 2018-04-07 NOTE — PROGRESS NOTE ADULT - SUBJECTIVE AND OBJECTIVE BOX
53 yo M hx gerd, etoh abuse admitted with abd pain and etoh withdrawals and tx to ICU for worsening withdrawals.     Pt now off precedex.  Not requiring prn sedation  Pt hemodynamically stable    ROS  Denies chest pain  denies abd pain  denies SOB  denies cough  +thirsty/hungry                          13.7   5.20  )-----------( 185      ( 07 Apr 2018 04:45 )             41.7   04-07    139  |  106  |  9   ----------------------------<  92  3.4<L>   |  25  |  0.76    Ca    8.5      07 Apr 2018 04:45  Phos  3.0     04-07  Mg     1.8     04-07    TPro  7.4  /  Alb  3.6  /  TBili  1.0  /  DBili  x   /  AST  49<H>  /  ALT  48  /  AlkPhos  44  04-06    MEDICATIONS  (STANDING):  atorvastatin 20 milliGRAM(s) Oral at bedtime  diphenhydrAMINE   Injectable 25 milliGRAM(s) IV Push once  FIRST- Mouthwash  BLM 5 milliLiter(s) Swish and Swallow every 8 hours  folic acid 1 milliGRAM(s) Oral daily  heparin  Injectable 5000 Unit(s) SubCutaneous every 12 hours  influenza   Vaccine 0.5 milliLiter(s) IntraMuscular once  LORazepam   Injectable   IV Push   LORazepam   Injectable 0.5 milliGRAM(s) IV Push every 4 hours  multivitamin 1 Tablet(s) Oral daily  pantoprazole    Tablet 40 milliGRAM(s) Oral two times a day  sucralfate suspension 1 Gram(s) Oral four times a day  thiamine 100 milliGRAM(s) Oral daily    ICU Vital Signs Last 24 Hrs  T(C): 36.2 (07 Apr 2018 08:00), Max: 36.8 (07 Apr 2018 04:00)  T(F): 97.2 (07 Apr 2018 08:00), Max: 98.2 (07 Apr 2018 04:00)  HR: 86 (07 Apr 2018 12:00) (51 - 106)  BP: 115/75 (07 Apr 2018 12:00) (102/66 - 148/87)  BP(mean): 85 (07 Apr 2018 12:00) (74 - 112)  ABP: --  ABP(mean): --  RR: 19 (07 Apr 2018 12:00) (12 - 22)  SpO2: 98% (07 Apr 2018 12:00) (96% - 99%)      calm, oriented  arash  moist membranes  neck no LAD  b/l cta  s1s2 reg no murmur  soft nontender +BS  no edema  moving all extremities

## 2018-04-08 DIAGNOSIS — F10.239 ALCOHOL DEPENDENCE WITH WITHDRAWAL, UNSPECIFIED: ICD-10-CM

## 2018-04-08 LAB
ANION GAP SERPL CALC-SCNC: 6 MMOL/L — SIGNIFICANT CHANGE UP (ref 5–17)
BUN SERPL-MCNC: 12 MG/DL — SIGNIFICANT CHANGE UP (ref 7–23)
CALCIUM SERPL-MCNC: 9.3 MG/DL — SIGNIFICANT CHANGE UP (ref 8.5–10.1)
CHLORIDE SERPL-SCNC: 106 MMOL/L — SIGNIFICANT CHANGE UP (ref 96–108)
CO2 SERPL-SCNC: 27 MMOL/L — SIGNIFICANT CHANGE UP (ref 22–31)
CREAT SERPL-MCNC: 1 MG/DL — SIGNIFICANT CHANGE UP (ref 0.5–1.3)
GLUCOSE SERPL-MCNC: 77 MG/DL — SIGNIFICANT CHANGE UP (ref 70–99)
HCT VFR BLD CALC: 42.3 % — SIGNIFICANT CHANGE UP (ref 39–50)
HGB BLD-MCNC: 14.2 G/DL — SIGNIFICANT CHANGE UP (ref 13–17)
MAGNESIUM SERPL-MCNC: 1.9 MG/DL — SIGNIFICANT CHANGE UP (ref 1.6–2.6)
MCHC RBC-ENTMCNC: 29 PG — SIGNIFICANT CHANGE UP (ref 27–34)
MCHC RBC-ENTMCNC: 33.6 GM/DL — SIGNIFICANT CHANGE UP (ref 32–36)
MCV RBC AUTO: 86.5 FL — SIGNIFICANT CHANGE UP (ref 80–100)
NRBC # BLD: 0 /100 WBCS — SIGNIFICANT CHANGE UP (ref 0–0)
PHOSPHATE SERPL-MCNC: 3.5 MG/DL — SIGNIFICANT CHANGE UP (ref 2.5–4.5)
PLATELET # BLD AUTO: 219 K/UL — SIGNIFICANT CHANGE UP (ref 150–400)
POTASSIUM SERPL-MCNC: 3.9 MMOL/L — SIGNIFICANT CHANGE UP (ref 3.5–5.3)
POTASSIUM SERPL-SCNC: 3.9 MMOL/L — SIGNIFICANT CHANGE UP (ref 3.5–5.3)
RBC # BLD: 4.89 M/UL — SIGNIFICANT CHANGE UP (ref 4.2–5.8)
RBC # FLD: 12.7 % — SIGNIFICANT CHANGE UP (ref 10.3–14.5)
SODIUM SERPL-SCNC: 139 MMOL/L — SIGNIFICANT CHANGE UP (ref 135–145)
WBC # BLD: 5.02 K/UL — SIGNIFICANT CHANGE UP (ref 3.8–10.5)
WBC # FLD AUTO: 5.02 K/UL — SIGNIFICANT CHANGE UP (ref 3.8–10.5)

## 2018-04-08 PROCEDURE — 99233 SBSQ HOSP IP/OBS HIGH 50: CPT

## 2018-04-08 RX ORDER — DEXMEDETOMIDINE HYDROCHLORIDE IN 0.9% SODIUM CHLORIDE 4 UG/ML
0.2 INJECTION INTRAVENOUS
Qty: 200 | Refills: 0 | Status: DISCONTINUED | OUTPATIENT
Start: 2018-04-08 | End: 2018-04-10

## 2018-04-08 RX ORDER — PHENOBARBITAL 60 MG
65 TABLET ORAL
Qty: 0 | Refills: 0 | Status: DISCONTINUED | OUTPATIENT
Start: 2018-04-08 | End: 2018-04-08

## 2018-04-08 RX ORDER — PHENOBARBITAL 60 MG
260 TABLET ORAL
Qty: 0 | Refills: 0 | Status: DISCONTINUED | OUTPATIENT
Start: 2018-04-08 | End: 2018-04-08

## 2018-04-08 RX ORDER — PHENOBARBITAL 60 MG
65 TABLET ORAL ONCE
Qty: 0 | Refills: 0 | Status: DISCONTINUED | OUTPATIENT
Start: 2018-04-08 | End: 2018-04-08

## 2018-04-08 RX ORDER — MIDAZOLAM HYDROCHLORIDE 1 MG/ML
6 INJECTION, SOLUTION INTRAMUSCULAR; INTRAVENOUS ONCE
Qty: 0 | Refills: 0 | Status: DISCONTINUED | OUTPATIENT
Start: 2018-04-08 | End: 2018-04-08

## 2018-04-08 RX ORDER — MIDAZOLAM HYDROCHLORIDE 1 MG/ML
2 INJECTION, SOLUTION INTRAMUSCULAR; INTRAVENOUS ONCE
Qty: 0 | Refills: 0 | Status: DISCONTINUED | OUTPATIENT
Start: 2018-04-08 | End: 2018-04-08

## 2018-04-08 RX ORDER — PHENOBARBITAL 60 MG
130 TABLET ORAL
Qty: 0 | Refills: 0 | Status: DISCONTINUED | OUTPATIENT
Start: 2018-04-08 | End: 2018-04-11

## 2018-04-08 RX ADMIN — Medication 4 MILLIGRAM(S): at 22:35

## 2018-04-08 RX ADMIN — Medication 1 MILLIGRAM(S): at 11:30

## 2018-04-08 RX ADMIN — Medication 2 MILLIGRAM(S): at 08:55

## 2018-04-08 RX ADMIN — MIDAZOLAM HYDROCHLORIDE 2 MILLIGRAM(S): 1 INJECTION, SOLUTION INTRAMUSCULAR; INTRAVENOUS at 22:27

## 2018-04-08 RX ADMIN — Medication 65 MILLIGRAM(S): at 14:32

## 2018-04-08 RX ADMIN — Medication 2 MILLIGRAM(S): at 18:22

## 2018-04-08 RX ADMIN — Medication 2 MILLIGRAM(S): at 13:21

## 2018-04-08 RX ADMIN — DEXMEDETOMIDINE HYDROCHLORIDE IN 0.9% SODIUM CHLORIDE 4.39 MICROGRAM(S)/KG/HR: 4 INJECTION INTRAVENOUS at 22:30

## 2018-04-08 RX ADMIN — MIDAZOLAM HYDROCHLORIDE 6 MILLIGRAM(S): 1 INJECTION, SOLUTION INTRAMUSCULAR; INTRAVENOUS at 22:49

## 2018-04-08 RX ADMIN — Medication 2 MILLIGRAM(S): at 06:48

## 2018-04-08 RX ADMIN — Medication 2 MILLIGRAM(S): at 09:56

## 2018-04-08 RX ADMIN — Medication 1 GRAM(S): at 11:30

## 2018-04-08 RX ADMIN — Medication 650 MILLIGRAM(S): at 00:13

## 2018-04-08 RX ADMIN — Medication 2 MILLIGRAM(S): at 15:53

## 2018-04-08 RX ADMIN — Medication 4 MILLIGRAM(S): at 18:58

## 2018-04-08 RX ADMIN — Medication 650 MILLIGRAM(S): at 02:37

## 2018-04-08 RX ADMIN — ATORVASTATIN CALCIUM 20 MILLIGRAM(S): 80 TABLET, FILM COATED ORAL at 21:31

## 2018-04-08 RX ADMIN — Medication 2 MILLIGRAM(S): at 21:17

## 2018-04-08 RX ADMIN — PANTOPRAZOLE SODIUM 40 MILLIGRAM(S): 20 TABLET, DELAYED RELEASE ORAL at 06:03

## 2018-04-08 RX ADMIN — DIPHENHYDRAMINE HYDROCHLORIDE AND LIDOCAINE HYDROCHLORIDE AND ALUMINUM HYDROXIDE AND MAGNESIUM HYDRO 5 MILLILITER(S): KIT at 06:00

## 2018-04-08 RX ADMIN — Medication 2 MILLIGRAM(S): at 14:14

## 2018-04-08 RX ADMIN — Medication 130 MILLIGRAM(S): at 22:00

## 2018-04-08 RX ADMIN — HEPARIN SODIUM 5000 UNIT(S): 5000 INJECTION INTRAVENOUS; SUBCUTANEOUS at 06:01

## 2018-04-08 RX ADMIN — Medication 1 GRAM(S): at 06:01

## 2018-04-08 RX ADMIN — Medication 100 MILLIGRAM(S): at 11:30

## 2018-04-08 RX ADMIN — Medication 65 MILLIGRAM(S): at 16:41

## 2018-04-08 RX ADMIN — Medication 130 MILLIGRAM(S): at 22:15

## 2018-04-08 RX ADMIN — Medication 1 TABLET(S): at 11:30
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
## 2018-04-08 NOTE — PROGRESS NOTE ADULT - SUBJECTIVE AND OBJECTIVE BOX
Gastroenterology  Patient seen and examined bedside resting comfortably.  No complaints offered. Abdominal pain is well controlled.  Denies nausea and vomiting. Tolerating diet.  Normal flatus/BM.     T(F): 97.5 (04-08-18 @ 08:00), Max: 99.1 (04-07-18 @ 16:23)  HR: 108 (04-08-18 @ 08:00) (80 - 126)  BP: 137/101 (04-08-18 @ 08:00) (102/66 - 145/95)  RR: 16 (04-08-18 @ 08:00) (16 - 22)  SpO2: 100% (04-08-18 @ 08:00) (97% - 100%)  Wt(kg): --  CAPILLARY BLOOD GLUCOSE    PHYSICAL EXAM:  General: NAD, WDWN.   Neuro:  Alert & responsive  HEENT: NCAT, EOMI, conjunctiva clear  CV: +S1+S2 regular rate and rhythm  Lung: clear to ausculation bilaterally, respirations nonlabored, good inspiratory effort  Abdomen: soft, Non tender, No Distension. Normal active BS  Extremities: no pedal edema or calf tenderness noted     LABS:                        14.2   5.02  )-----------( 219      ( 08 Apr 2018 07:13 )             42.3     04-08    139  |  106  |  12  ----------------------------<  77  3.9   |  27  |  1.00    Ca    9.3      08 Apr 2018 07:13  Phos  3.5     04-08  Mg     1.9     04-08      I&O's Detail    07 Apr 2018 07:01  -  08 Apr 2018 07:00  --------------------------------------------------------  IN:    dexmedetomidine Infusion: 9.9 mL    lactated ringers.: 400 mL    Oral Fluid: 980 mL  Total IN: 1389.9 mL    OUT:  Total OUT: 0 mL    Total NET: 1389.9 mL        04-08 @ 07:13    139 | 106 | 12  /9.3 | 1.9 | 3.5  _______________________/  3.9 | 27 | 1.00                           \par

## 2018-04-08 NOTE — PROGRESS NOTE ADULT - PROBLEM SELECTOR PLAN 1
Phenobarbital 65mg x2 given at bedside.  Pt appears calm and appropriate.  If pt continues to become agitated/restless/combative despite receiving symptom-triggered ativan , phenobarb should be given.    Recommend increasing ativan to 1mg q4 hours without taper at this time.   Should reassess in 24 hours for taper if appropriate.  Hold for lethargy.  Agree with transfer to telemetry as 1D can not administer phenobarbital.    continue 1:1 observation.  It is imperative that CIWA is checked in timely fashion particularly when CIWA is elevated and pt is treated with additional doses of ativan.      Does not need ICU transfer at this time.  Please call for re-consult if pt's condition changes.

## 2018-04-08 NOTE — PROGRESS NOTE ADULT - SUBJECTIVE AND OBJECTIVE BOX
Hospitalist Medicine PA    Patient seen and examined at bedside. Agitated, trying to get OOB, mumbling undecipherable sentences in his native language. Patient currently on 1:1. Denies seeing/hearing anything out of the ordinary. Denies headaches, chest pain, palpitations, SOB, abdominal pain.   Patient received 6mg ativan this AM for agitation. However still confused.     HPI: 52 year old man with PMH gerd, HLD, etoh abuse presents with complaint of upper abdominal pain, nausea, and several episodes of emesis since yesterday morning.  He says he thinks he had EGD recently which was "ok" but he is not sure.  He is still drinking about 2 bottles of rum over a 3 day period every week.  He also says he is starting to feel tremors and has a headache.  He has not had a drink in about 2 days due to his abdominal pain.    REVIEW OF SYSTEMS:  Constitutional: Denies fever, weight loss, fatigue  Eye: Denies eye pain, visual changes, discharge, blurred vision  ENT: Denies hearing changes, tinnitus, vertigo, sinus congestion, sore throat  Neck: Denies pain or stiffness  Respiratory: Denies cough, wheezing, chills, hemoptysis, shortness of breath, difficulty breathing  Cardiovascular: Denies chest pain, palpitations, dizziness, leg swelling  Gastrointestinal: Denies abdominal pain, nausea, vomiting, hematemesis, diarrhea, constipation, melena, hematochezia  Genitourinary: Denies dysuria, frequency, hematuria, retention, incontinence  Neurological: Denies headaches, memory loss, loss of strength, numbness, tremors  Skin: Denies itching, burning, rashes, lesions   Endocrine: Denies heat or cold intolerance, hair loss  Musculoskeletal: Denies joint pain or swelling, back, extremity pain  Psychiatric: Denies depression, anxiety, mood swings, difficulty sleeping, suicidal ideation  Hematology: Denies easy bruising, bleeding gums  Immunologic: Denies hives or eczema    VITALS:  T(F): 97.5 (04-08-18 @ 08:00), Max: 99.1 (04-07-18 @ 16:23)  HR: 122 (04-08-18 @ 09:58) (80 - 126)  BP: 143/96 (04-08-18 @ 09:58) (109/73 - 145/95)  RR: 16 (04-08-18 @ 09:58) (16 - 22)  SpO2: 99% (04-08-18 @ 09:58) (97% - 100%)    PHYSICAL EXAM:  General: NAD, well-groomed, well-developed.  Head: NC/NT.  Eyes: PERRLA, EOMI. Conjunctiva and sclera clear.  ENT: No tonsillar erythema, exudates, or enlargement. Moist mucous membranes, good dentition, no lesions.  Neck: Supple. No JVD. Normal thyroid. No carotid bruits.  Lungs: Airway patent. CTA B/L. Normal breath sounds. No wheezes, rales, rhonchi.  Heart: RRR. Normal S1/S2. No murmurs appreciated.  Abdomen: Soft, NT/ND. Normoactive BS x 4 quadrants. No bruits. No CVAT.  Neurological:  AAOx3. Good concentration. CN II-XII grossly intact. Strength 5/5 B/L upper and lower extremities. Sensation in tact. DTRs 2+ intact and symmetric. Normal Rhomberg and pronator drift. Gait normal.  Extremities:  2+ B/L peripheral Pulses. No clubbing, cyanosis, or edema.  Lymphatic: No LAD.  Skin: Warm and dry. No rashes or lesions.    MEDICATIONS:  acetaminophen   Tablet. 650 milliGRAM(s) Oral every 6 hours PRN  atorvastatin 20 milliGRAM(s) Oral at bedtime  diphenhydrAMINE   Injectable 25 milliGRAM(s) IV Push once  FIRST- Mouthwash  BLM 5 milliLiter(s) Swish and Swallow every 8 hours  folic acid 1 milliGRAM(s) Oral daily  heparin  Injectable 5000 Unit(s) SubCutaneous every 12 hours  influenza   Vaccine 0.5 milliLiter(s) IntraMuscular once  LORazepam   Injectable 2 milliGRAM(s) IV Push every 1 hour PRN  LORazepam   Injectable   IV Push   LORazepam   Injectable 0.5 milliGRAM(s) IV Push every 12 hours  multivitamin 1 Tablet(s) Oral daily  ondansetron Injectable 8 milliGRAM(s) IV Push every 8 hours PRN  oxyCODONE    5 mG/acetaminophen 325 mG 1 Tablet(s) Oral every 4 hours PRN  pantoprazole    Tablet 40 milliGRAM(s) Oral two times a day  PHENobarbital Injectable 65 milliGRAM(s) IV Push every 4 hours PRN  sucralfate suspension 1 Gram(s) Oral four times a day  thiamine 100 milliGRAM(s) Oral daily    LABS:               14.2   5.02  )-----------( 219      ( 08 Apr 2018 07:13 )             42.3     04-08    139  |  106  |  12  ----------------------------<  77  3.9   |  27  |  1.00    Ca    9.3      08 Apr 2018 07:13  Phos  3.5     04-08  Mg     1.9     04-08 07 Apr 2018 07:01  -  08 Apr 2018 07:00  --------------------------------------------------------  IN:    dexmedetomidine Infusion: 9.9 mL    lactated ringers.: 400 mL    Oral Fluid: 980 mL  Total IN: 1389.9 mL    OUT:  Total OUT: 0 mL  Total NET: 1389.9 mL    ASSESSMENT: 52y Male admitted for     .  PMH of Mixed hyperlipidemia  PUD (peptic ulcer disease)  Alcohol abuse      PLAN:  - Admit to unit:    [Telemetry/Medicine/Surgery/Hospice]  - Fluids:    [NS/D5W/D5NS]  - ABx:  - Acute Meds:  - Chronic Meds:  - Diet:    [NPO/Puree/Soft/Reg (DASH/TLC, DM, HLD)]  - Services:    [Speech/OT/PT]  - Tests:    [EKG, CT, MRI, Echo, CXR]  - Labs:    [CBC, BMP, BNP, ABG, lactate, troponin, HbA1c, PT/INR, PTT, ESR, CRP, FSG, beta-HCG, TSH w/ reflex, UA, Urine Cx, Blood Cx]  - Consult:  - VTE PPx:     [PCS/Ambulate/LMWH/Heparin]  - GI PPx:    [Protonix]  - Discussed with Dr. gutiérrez and agrees with the plan  - Will continue to follow up Hospitalist Medicine PA    Patient seen and examined at bedside. Agitated, trying to get OOB, mumbling undecipherable sentences in his native language. Patient currently on 1:1. Denies seeing/hearing anything out of the ordinary. Denies headaches, chest pain, palpitations, SOB, abdominal pain.   Patient received 6mg ativan this AM for agitation. However still confused.     HPI: 52 year old man with PMH gerd, HLD, etoh abuse presents with complaint of upper abdominal pain, nausea, and several episodes of emesis since yesterday morning.  He says he thinks he had EGD recently which was "ok" but he is not sure.  He is still drinking about 2 bottles of rum over a 3 day period every week.  He also says he is starting to feel tremors and has a headache.  He has not had a drink in about 2 days due to his abdominal pain.    REVIEW OF SYSTEMS:  Constitutional: Denies fever, weight loss, fatigue  Eye: Denies eye pain, visual changes, discharge, blurred vision  ENT: Denies hearing changes, tinnitus, vertigo, sinus congestion, sore throat  Neck: Denies pain or stiffness  Respiratory: Denies cough, wheezing, chills, hemoptysis, shortness of breath, difficulty breathing  Cardiovascular: Denies chest pain, palpitations, dizziness, leg swelling  Gastrointestinal: Denies abdominal pain, nausea, vomiting, hematemesis, diarrhea, constipation, melena, hematochezia  Genitourinary: Denies dysuria, frequency, hematuria, retention, incontinence  Neurological: Denies headaches, memory loss, loss of strength, numbness, tremors  Skin: Denies itching, burning, rashes, lesions   Endocrine: Denies heat or cold intolerance, hair loss  Musculoskeletal: Denies joint pain or swelling, back, extremity pain  Psychiatric: Denies depression, anxiety, mood swings, difficulty sleeping, suicidal ideation  Hematology: Denies easy bruising, bleeding gums  Immunologic: Denies hives or eczema    VITALS:  T(F): 97.5 (04-08-18 @ 08:00), Max: 99.1 (04-07-18 @ 16:23)  HR: 122 (04-08-18 @ 09:58) (80 - 126)  BP: 143/96 (04-08-18 @ 09:58) (109/73 - 145/95)  RR: 16 (04-08-18 @ 09:58) (16 - 22)  SpO2: 99% (04-08-18 @ 09:58) (97% - 100%)    PHYSICAL EXAM:  General: NAD, well-groomed, well-developed.  Head: NC/NT.  Eyes: PERRLA, EOMI. Conjunctiva and sclera clear.  ENT: No tonsillar erythema, exudates, or enlargement. Moist mucous membranes, good dentition, no lesions.  Neck: Supple. No JVD. Normal thyroid. No carotid bruits.  Lungs: Airway patent. CTA B/L. Normal breath sounds. No wheezes, rales, rhonchi.  Heart: RRR. Normal S1/S2. No murmurs appreciated.  Abdomen: Soft, NT/ND. Normoactive BS x 4 quadrants. No bruits. No CVAT.  Neurological:  AAOx3. Good concentration. CN II-XII grossly intact. Strength 5/5 B/L upper and lower extremities. Sensation in tact. DTRs 2+ intact and symmetric. Normal Rhomberg and pronator drift. Gait normal.  Extremities:  2+ B/L peripheral Pulses. No clubbing, cyanosis, or edema.  Lymphatic: No LAD.  Skin: Warm and dry. No rashes or lesions.    MEDICATIONS:  acetaminophen   Tablet. 650 milliGRAM(s) Oral every 6 hours PRN  atorvastatin 20 milliGRAM(s) Oral at bedtime  diphenhydrAMINE   Injectable 25 milliGRAM(s) IV Push once  FIRST- Mouthwash  BLM 5 milliLiter(s) Swish and Swallow every 8 hours  folic acid 1 milliGRAM(s) Oral daily  heparin  Injectable 5000 Unit(s) SubCutaneous every 12 hours  influenza   Vaccine 0.5 milliLiter(s) IntraMuscular once  LORazepam   Injectable 2 milliGRAM(s) IV Push every 1 hour PRN  LORazepam   Injectable   IV Push   LORazepam   Injectable 0.5 milliGRAM(s) IV Push every 12 hours  multivitamin 1 Tablet(s) Oral daily  ondansetron Injectable 8 milliGRAM(s) IV Push every 8 hours PRN  oxyCODONE    5 mG/acetaminophen 325 mG 1 Tablet(s) Oral every 4 hours PRN  pantoprazole    Tablet 40 milliGRAM(s) Oral two times a day  PHENobarbital Injectable 65 milliGRAM(s) IV Push every 4 hours PRN  sucralfate suspension 1 Gram(s) Oral four times a day  thiamine 100 milliGRAM(s) Oral daily    LABS:               14.2   5.02  )-----------( 219      ( 08 Apr 2018 07:13 )             42.3     04-08    139  |  106  |  12  ----------------------------<  77  3.9   |  27  |  1.00    Ca    9.3      08 Apr 2018 07:13  Phos  3.5     04-08  Mg     1.9     04-08 07 Apr 2018 07:01  -  08 Apr 2018 07:00  --------------------------------------------------------  IN:    dexmedetomidine Infusion: 9.9 mL    lactated ringers.: 400 mL    Oral Fluid: 980 mL  Total IN: 1389.9 mL    OUT:  Total OUT: 0 mL  Total NET: 1389.9 mL Hospitalist Medicine PA    Patient seen and examined at bedside. Agitated, trying to get OOB, mumbling undecipherable sentences in his native language. Patient currently on 1:1. Denies seeing/hearing anything out of the ordinary. Denies headaches, chest pain, palpitations, SOB, abdominal pain.   Patient received 6mg ativan this AM for agitation. However still confused.     HPI: 52 year old man with PMH gerd, HLD, etoh abuse presents with complaint of upper abdominal pain, nausea, and several episodes of emesis since yesterday morning.  He says he thinks he had EGD recently which was "ok" but he is not sure.  He is still drinking about 2 bottles of rum over a 3 day period every week.  He also says he is starting to feel tremors and has a headache.  He has not had a drink in about 2 days due to his abdominal pain.    REVIEW OF SYSTEMS:  Constitutional: Denies fever, weight loss, fatigue  Eye: Denies eye pain, visual changes, discharge, blurred vision  ENT: Denies hearing changes, tinnitus, vertigo, sinus congestion, sore throat  Neck: Denies pain or stiffness  Respiratory: Denies cough, wheezing, chills, hemoptysis, shortness of breath, difficulty breathing  Cardiovascular: Denies chest pain, palpitations, dizziness, leg swelling  Gastrointestinal: Denies abdominal pain, nausea, vomiting, hematemesis, diarrhea, constipation, melena, hematochezia  Genitourinary: Denies dysuria, frequency, hematuria, retention, incontinence  Neurological: Denies headaches, memory loss, loss of strength, numbness, tremors  Skin: Denies itching, burning, rashes, lesions   Endocrine: Denies heat or cold intolerance, hair loss  Musculoskeletal: Denies joint pain or swelling, back, extremity pain  Psychiatric: Denies depression, anxiety, mood swings, difficulty sleeping, suicidal ideation  Hematology: Denies easy bruising, bleeding gums  Immunologic: Denies hives or eczema    VITALS:  T(F): 97.5 (04-08-18 @ 08:00), Max: 99.1 (04-07-18 @ 16:23)  HR: 122 (04-08-18 @ 09:58) (80 - 126)  BP: 143/96 (04-08-18 @ 09:58) (109/73 - 145/95)  RR: 16 (04-08-18 @ 09:58) (16 - 22)  SpO2: 99% (04-08-18 @ 09:58) (97% - 100%)    PHYSICAL EXAM:  General: NAD, well-groomed, well-developed.  Head: NC/NT.  Eyes: PERRLA, EOMI. Conjunctiva and sclera clear.  ENT: No tonsillar erythema, exudates, or enlargement.  Neck: Supple. No JVD. Normal thyroid. No carotid bruits.  Lungs: Airway patent. CTA B/L. Normal breath sounds. No wheezes, rales, rhonchi.  Heart: RRR. Normal S1/S2. No murmurs appreciated.  Abdomen: Soft, NT/ND. Normoactive BS x 4 quadrants. No bruits. No CVAT.  Neurological: Agitated and confused 2/2 to etoh withdrawal. Strength 5/5 B/L upper and lower extremities. No tremors  Extremities:  2+ B/L peripheral Pulses. No clubbing, cyanosis, or edema.  Lymphatic: No LAD.  Skin: Warm and dry. No rashes or lesions.    MEDICATIONS:  acetaminophen   Tablet. 650 milliGRAM(s) Oral every 6 hours PRN  atorvastatin 20 milliGRAM(s) Oral at bedtime  diphenhydrAMINE   Injectable 25 milliGRAM(s) IV Push once  FIRST- Mouthwash  BLM 5 milliLiter(s) Swish and Swallow every 8 hours  folic acid 1 milliGRAM(s) Oral daily  heparin  Injectable 5000 Unit(s) SubCutaneous every 12 hours  influenza   Vaccine 0.5 milliLiter(s) IntraMuscular once  LORazepam   Injectable 2 milliGRAM(s) IV Push every 1 hour PRN  LORazepam   Injectable   IV Push   LORazepam   Injectable 0.5 milliGRAM(s) IV Push every 12 hours  multivitamin 1 Tablet(s) Oral daily  ondansetron Injectable 8 milliGRAM(s) IV Push every 8 hours PRN  oxyCODONE    5 mG/acetaminophen 325 mG 1 Tablet(s) Oral every 4 hours PRN  pantoprazole    Tablet 40 milliGRAM(s) Oral two times a day  PHENobarbital Injectable 65 milliGRAM(s) IV Push every 4 hours PRN  sucralfate suspension 1 Gram(s) Oral four times a day  thiamine 100 milliGRAM(s) Oral daily    LABS:               14.2   5.02  )-----------( 219      ( 08 Apr 2018 07:13 )             42.3     04-08    139  |  106  |  12  ----------------------------<  77  3.9   |  27  |  1.00    Ca    9.3      08 Apr 2018 07:13  Phos  3.5     04-08  Mg     1.9     04-08 07 Apr 2018 07:01  -  08 Apr 2018 07:00  --------------------------------------------------------  IN:    dexmedetomidine Infusion: 9.9 mL    lactated ringers.: 400 mL    Oral Fluid: 980 mL  Total IN: 1389.9 mL    OUT:  Total OUT: 0 mL  Total NET: 1389.9 mL

## 2018-04-08 NOTE — PROGRESS NOTE ADULT - PROBLEM SELECTOR PLAN 1
Continue CIWA protocol  -patient with persistent agitation and confusion despite 6mg ativan. Will push 65mg phenobarbitol   -transfer to telemetry unit for increased monitoring with phenobarbitol push  -if patient continues with worsening withdrawals, consider ICU consult for possible retransfer   -continue thiamine, folate, multivitamin  -constant observation  -ativan 2mg q1h prn for CIWA >8  -phenobarbitol 65mg q4 prn if ativan ineffective  -f/u AM labs with mag and phos levels

## 2018-04-09 LAB
ANION GAP SERPL CALC-SCNC: 10 MMOL/L — SIGNIFICANT CHANGE UP (ref 5–17)
BUN SERPL-MCNC: 12 MG/DL — SIGNIFICANT CHANGE UP (ref 7–23)
CALCIUM SERPL-MCNC: 8.9 MG/DL — SIGNIFICANT CHANGE UP (ref 8.5–10.1)
CHLORIDE SERPL-SCNC: 108 MMOL/L — SIGNIFICANT CHANGE UP (ref 96–108)
CO2 SERPL-SCNC: 24 MMOL/L — SIGNIFICANT CHANGE UP (ref 22–31)
CREAT SERPL-MCNC: 0.9 MG/DL — SIGNIFICANT CHANGE UP (ref 0.5–1.3)
CULTURE RESULTS: SIGNIFICANT CHANGE UP
CULTURE RESULTS: SIGNIFICANT CHANGE UP
GLUCOSE SERPL-MCNC: 102 MG/DL — HIGH (ref 70–99)
HCT VFR BLD CALC: 39.8 % — SIGNIFICANT CHANGE UP (ref 39–50)
HGB BLD-MCNC: 13 G/DL — SIGNIFICANT CHANGE UP (ref 13–17)
MAGNESIUM SERPL-MCNC: 2 MG/DL — SIGNIFICANT CHANGE UP (ref 1.6–2.6)
MCHC RBC-ENTMCNC: 28.6 PG — SIGNIFICANT CHANGE UP (ref 27–34)
MCHC RBC-ENTMCNC: 32.7 GM/DL — SIGNIFICANT CHANGE UP (ref 32–36)
MCV RBC AUTO: 87.7 FL — SIGNIFICANT CHANGE UP (ref 80–100)
NRBC # BLD: 0 /100 WBCS — SIGNIFICANT CHANGE UP (ref 0–0)
PHOSPHATE SERPL-MCNC: 4.5 MG/DL — SIGNIFICANT CHANGE UP (ref 2.5–4.5)
PLATELET # BLD AUTO: 232 K/UL — SIGNIFICANT CHANGE UP (ref 150–400)
POTASSIUM SERPL-MCNC: 3.7 MMOL/L — SIGNIFICANT CHANGE UP (ref 3.5–5.3)
POTASSIUM SERPL-SCNC: 3.7 MMOL/L — SIGNIFICANT CHANGE UP (ref 3.5–5.3)
RBC # BLD: 4.54 M/UL — SIGNIFICANT CHANGE UP (ref 4.2–5.8)
RBC # FLD: 13 % — SIGNIFICANT CHANGE UP (ref 10.3–14.5)
SODIUM SERPL-SCNC: 142 MMOL/L — SIGNIFICANT CHANGE UP (ref 135–145)
SPECIMEN SOURCE: SIGNIFICANT CHANGE UP
SPECIMEN SOURCE: SIGNIFICANT CHANGE UP
WBC # BLD: 6.23 K/UL — SIGNIFICANT CHANGE UP (ref 3.8–10.5)
WBC # FLD AUTO: 6.23 K/UL — SIGNIFICANT CHANGE UP (ref 3.8–10.5)

## 2018-04-09 PROCEDURE — 99233 SBSQ HOSP IP/OBS HIGH 50: CPT

## 2018-04-09 RX ORDER — SODIUM CHLORIDE 9 MG/ML
1000 INJECTION INTRAMUSCULAR; INTRAVENOUS; SUBCUTANEOUS ONCE
Qty: 0 | Refills: 0 | Status: COMPLETED | OUTPATIENT
Start: 2018-04-09 | End: 2018-04-09

## 2018-04-09 RX ORDER — SODIUM CHLORIDE 9 MG/ML
1000 INJECTION INTRAMUSCULAR; INTRAVENOUS; SUBCUTANEOUS
Qty: 0 | Refills: 0 | Status: DISCONTINUED | OUTPATIENT
Start: 2018-04-09 | End: 2018-04-10

## 2018-04-09 RX ADMIN — Medication 1 TABLET(S): at 12:25

## 2018-04-09 RX ADMIN — Medication 1 MILLIGRAM(S): at 13:48

## 2018-04-09 RX ADMIN — DIPHENHYDRAMINE HYDROCHLORIDE AND LIDOCAINE HYDROCHLORIDE AND ALUMINUM HYDROXIDE AND MAGNESIUM HYDRO 5 MILLILITER(S): KIT at 23:02

## 2018-04-09 RX ADMIN — SODIUM CHLORIDE 2000 MILLILITER(S): 9 INJECTION INTRAMUSCULAR; INTRAVENOUS; SUBCUTANEOUS at 16:45

## 2018-04-09 RX ADMIN — HEPARIN SODIUM 5000 UNIT(S): 5000 INJECTION INTRAVENOUS; SUBCUTANEOUS at 05:36

## 2018-04-09 RX ADMIN — PANTOPRAZOLE SODIUM 40 MILLIGRAM(S): 20 TABLET, DELAYED RELEASE ORAL at 17:38

## 2018-04-09 RX ADMIN — HEPARIN SODIUM 5000 UNIT(S): 5000 INJECTION INTRAVENOUS; SUBCUTANEOUS at 17:38

## 2018-04-09 RX ADMIN — Medication 1 GRAM(S): at 23:03

## 2018-04-09 RX ADMIN — Medication 100 MILLIGRAM(S): at 12:25

## 2018-04-09 RX ADMIN — DEXMEDETOMIDINE HYDROCHLORIDE IN 0.9% SODIUM CHLORIDE 4.39 MICROGRAM(S)/KG/HR: 4 INJECTION INTRAVENOUS at 09:00

## 2018-04-09 RX ADMIN — Medication 1 MILLIGRAM(S): at 10:39

## 2018-04-09 RX ADMIN — SODIUM CHLORIDE 75 MILLILITER(S): 9 INJECTION INTRAMUSCULAR; INTRAVENOUS; SUBCUTANEOUS at 06:50

## 2018-04-09 RX ADMIN — Medication 1 MILLIGRAM(S): at 12:25

## 2018-04-09 RX ADMIN — Medication 1 MILLIGRAM(S): at 23:01

## 2018-04-09 RX ADMIN — DIPHENHYDRAMINE HYDROCHLORIDE AND LIDOCAINE HYDROCHLORIDE AND ALUMINUM HYDROXIDE AND MAGNESIUM HYDRO 5 MILLILITER(S): KIT at 05:36

## 2018-04-09 RX ADMIN — ATORVASTATIN CALCIUM 20 MILLIGRAM(S): 80 TABLET, FILM COATED ORAL at 23:03

## 2018-04-09 RX ADMIN — Medication 1 MILLIGRAM(S): at 05:36

## 2018-04-09 RX ADMIN — Medication 1 MILLIGRAM(S): at 17:47

## 2018-04-09 RX ADMIN — PANTOPRAZOLE SODIUM 40 MILLIGRAM(S): 20 TABLET, DELAYED RELEASE ORAL at 05:36

## 2018-04-09 RX ADMIN — SODIUM CHLORIDE 75 MILLILITER(S): 9 INJECTION INTRAMUSCULAR; INTRAVENOUS; SUBCUTANEOUS at 17:37

## 2018-04-09 RX ADMIN — Medication 1 GRAM(S): at 17:47

## 2018-04-09 RX ADMIN — Medication 1 GRAM(S): at 12:25

## 2018-04-09 RX ADMIN — Medication 1 GRAM(S): at 05:36

## 2018-04-09 RX ADMIN — Medication 1 MILLIGRAM(S): at 02:23

## 2018-04-09 RX ADMIN — DIPHENHYDRAMINE HYDROCHLORIDE AND LIDOCAINE HYDROCHLORIDE AND ALUMINUM HYDROXIDE AND MAGNESIUM HYDRO 5 MILLILITER(S): KIT at 13:48

## 2018-04-09 NOTE — PROGRESS NOTE ADULT - SUBJECTIVE AND OBJECTIVE BOX
Gastroenterology  Patient seen and examined bedside resting comfortably.  No complaints offered.   No abdominal pain  Denies nausea and vomiting. Tolerating diet.  Normal flatus/BM.     T(F): 98.6 (04-09-18 @ 07:01), Max: 99 (04-08-18 @ 20:18)  HR: 76 (04-09-18 @ 10:36) (52 - 123)  BP: 87/57 (04-09-18 @ 10:36) (87/57 - 145/108)  RR: 18 (04-09-18 @ 10:36) (13 - 25)  SpO2: 100% (04-09-18 @ 10:36) (98% - 100%)  Wt(kg): --  CAPILLARY BLOOD GLUCOSE          PHYSICAL EXAM:  General: NAD, WDWN.   Neuro:  Alert &responsive  HEENT: NCAT, EOMI, conjunctiva clear  CV: +S1+S2 regular rate and rhythm  Lung: clear to ausculation bilaterally, respirations nonlabored, good inspiratory effort  Abdomen: soft, NonTender, No distention Normal active BS  Extremities: no cyanosis, clubbing or edema    LABS:                        13.0   6.23  )-----------( 232      ( 09 Apr 2018 03:54 )             39.8     04-09    142  |  108  |  12  ----------------------------<  102<H>  3.7   |  24  |  0.90    Ca    8.9      09 Apr 2018 03:54  Phos  4.5     04-09  Mg     2.0     04-09          I&O's Detail    08 Apr 2018 07:01  -  09 Apr 2018 07:00  --------------------------------------------------------  IN:    dexmedetomidine Infusion: 119.5 mL    Oral Fluid: 680 mL    sodium chloride 0.9%.: 75 mL  Total IN: 874.5 mL    OUT:    Indwelling Catheter - Urethral: 40 mL    Intermittent Catheterization - Urethral: 500 mL  Total OUT: 540 mL    Total NET: 334.5 mL      09 Apr 2018 07:01  -  09 Apr 2018 11:21  --------------------------------------------------------  IN:    dexmedetomidine Infusion: 6.5 mL    Oral Fluid: 360 mL    sodium chloride 0.9%.: 150 mL  Total IN: 516.5 mL    OUT:  Total OUT: 0 mL    Total NET: 516.5 mL        04-09 @ 03:54    142 | 108 | 12  /8.9 | 2.0 | 4.5  _______________________/  3.7 | 24 | 0.90                           \par

## 2018-04-10 DIAGNOSIS — F10.231 ALCOHOL DEPENDENCE WITH WITHDRAWAL DELIRIUM: ICD-10-CM

## 2018-04-10 LAB
ANION GAP SERPL CALC-SCNC: 8 MMOL/L — SIGNIFICANT CHANGE UP (ref 5–17)
BUN SERPL-MCNC: 13 MG/DL — SIGNIFICANT CHANGE UP (ref 7–23)
CALCIUM SERPL-MCNC: 8.3 MG/DL — LOW (ref 8.5–10.1)
CHLORIDE SERPL-SCNC: 109 MMOL/L — HIGH (ref 96–108)
CO2 SERPL-SCNC: 23 MMOL/L — SIGNIFICANT CHANGE UP (ref 22–31)
CREAT SERPL-MCNC: 0.73 MG/DL — SIGNIFICANT CHANGE UP (ref 0.5–1.3)
GLUCOSE SERPL-MCNC: 97 MG/DL — SIGNIFICANT CHANGE UP (ref 70–99)
HCT VFR BLD CALC: 37.3 % — LOW (ref 39–50)
HGB BLD-MCNC: 12.3 G/DL — LOW (ref 13–17)
MAGNESIUM SERPL-MCNC: 1.9 MG/DL — SIGNIFICANT CHANGE UP (ref 1.6–2.6)
MCHC RBC-ENTMCNC: 29 PG — SIGNIFICANT CHANGE UP (ref 27–34)
MCHC RBC-ENTMCNC: 33 GM/DL — SIGNIFICANT CHANGE UP (ref 32–36)
MCV RBC AUTO: 88 FL — SIGNIFICANT CHANGE UP (ref 80–100)
NRBC # BLD: 0 /100 WBCS — SIGNIFICANT CHANGE UP (ref 0–0)
PHOSPHATE SERPL-MCNC: 2.8 MG/DL — SIGNIFICANT CHANGE UP (ref 2.5–4.5)
PLATELET # BLD AUTO: 227 K/UL — SIGNIFICANT CHANGE UP (ref 150–400)
POTASSIUM SERPL-MCNC: 3.5 MMOL/L — SIGNIFICANT CHANGE UP (ref 3.5–5.3)
POTASSIUM SERPL-SCNC: 3.5 MMOL/L — SIGNIFICANT CHANGE UP (ref 3.5–5.3)
RBC # BLD: 4.24 M/UL — SIGNIFICANT CHANGE UP (ref 4.2–5.8)
RBC # FLD: 13.1 % — SIGNIFICANT CHANGE UP (ref 10.3–14.5)
SODIUM SERPL-SCNC: 140 MMOL/L — SIGNIFICANT CHANGE UP (ref 135–145)
WBC # BLD: 4.69 K/UL — SIGNIFICANT CHANGE UP (ref 3.8–10.5)
WBC # FLD AUTO: 4.69 K/UL — SIGNIFICANT CHANGE UP (ref 3.8–10.5)

## 2018-04-10 PROCEDURE — 99233 SBSQ HOSP IP/OBS HIGH 50: CPT

## 2018-04-10 RX ORDER — POTASSIUM CHLORIDE 20 MEQ
20 PACKET (EA) ORAL ONCE
Qty: 0 | Refills: 0 | Status: COMPLETED | OUTPATIENT
Start: 2018-04-10 | End: 2018-04-10

## 2018-04-10 RX ADMIN — Medication 1 GRAM(S): at 12:20

## 2018-04-10 RX ADMIN — PANTOPRAZOLE SODIUM 40 MILLIGRAM(S): 20 TABLET, DELAYED RELEASE ORAL at 05:44

## 2018-04-10 RX ADMIN — Medication 1 MILLIGRAM(S): at 12:20

## 2018-04-10 RX ADMIN — Medication 1 GRAM(S): at 17:36

## 2018-04-10 RX ADMIN — HEPARIN SODIUM 5000 UNIT(S): 5000 INJECTION INTRAVENOUS; SUBCUTANEOUS at 17:36

## 2018-04-10 RX ADMIN — Medication 20 MILLIEQUIVALENT(S): at 06:50

## 2018-04-10 RX ADMIN — Medication 1 GRAM(S): at 05:44

## 2018-04-10 RX ADMIN — DIPHENHYDRAMINE HYDROCHLORIDE AND LIDOCAINE HYDROCHLORIDE AND ALUMINUM HYDROXIDE AND MAGNESIUM HYDRO 5 MILLILITER(S): KIT at 22:03

## 2018-04-10 RX ADMIN — Medication 1 MILLIGRAM(S): at 10:58

## 2018-04-10 RX ADMIN — Medication 1 MILLIGRAM(S): at 22:03

## 2018-04-10 RX ADMIN — ATORVASTATIN CALCIUM 20 MILLIGRAM(S): 80 TABLET, FILM COATED ORAL at 22:03

## 2018-04-10 RX ADMIN — SODIUM CHLORIDE 75 MILLILITER(S): 9 INJECTION INTRAMUSCULAR; INTRAVENOUS; SUBCUTANEOUS at 11:00

## 2018-04-10 RX ADMIN — DIPHENHYDRAMINE HYDROCHLORIDE AND LIDOCAINE HYDROCHLORIDE AND ALUMINUM HYDROXIDE AND MAGNESIUM HYDRO 5 MILLILITER(S): KIT at 05:44

## 2018-04-10 RX ADMIN — Medication 1 TABLET(S): at 12:20

## 2018-04-10 RX ADMIN — HEPARIN SODIUM 5000 UNIT(S): 5000 INJECTION INTRAVENOUS; SUBCUTANEOUS at 05:44

## 2018-04-10 RX ADMIN — DIPHENHYDRAMINE HYDROCHLORIDE AND LIDOCAINE HYDROCHLORIDE AND ALUMINUM HYDROXIDE AND MAGNESIUM HYDRO 5 MILLILITER(S): KIT at 17:36

## 2018-04-10 RX ADMIN — Medication 100 MILLIGRAM(S): at 12:20

## 2018-04-10 RX ADMIN — PANTOPRAZOLE SODIUM 40 MILLIGRAM(S): 20 TABLET, DELAYED RELEASE ORAL at 17:36

## 2018-04-10 NOTE — CHART NOTE - NSCHARTNOTEFT_GEN_A_CORE
Pt medically stable for transfer to medical floor.  Signed out to Dr. Newberry    53 yo M hx gerd, etoh abuse admitted with abd pain and etoh withdrawals and tx to ICU for worsening withdrawals requiring precedex infusion.  Pt seen by GI for abdominal pain.  No intervention at this time. Will continue protonix PO bid and carafate and plan EGD as outpatient.  Pt initially sent out to medical floor over the weekend but had to be re-admitted to ICU the following day for increased agitation and put back on precedex gtt.  Ativan is ordered for 1mg q4 without taper at this time.  Should reassess tomorrow and plan taper if appropriate.

## 2018-04-10 NOTE — PROGRESS NOTE ADULT - SUBJECTIVE AND OBJECTIVE BOX
Gastroenterology  Patient seen and examined bedside resting comfortably.  No complaints offered. No Abdominal pain  Denies nausea and vomiting. Tolerating diet.  Normal flatus/BM.     T(F): 97 (04-10-18 @ 07:00), Max: 98.4 (04-09-18 @ 23:00)  HR: 88 (04-10-18 @ 11:00) (54 - 107)  BP: 130/80 (04-10-18 @ 11:00) (106/71 - 147/85)  RR: 20 (04-10-18 @ 11:00) (11 - 24)  SpO2: 100% (04-10-18 @ 11:00) (100% - 100%)  Wt(kg): --  CAPILLARY BLOOD GLUCOSE      PHYSICAL EXAM:  General: NAD, WDWN.   Neuro:  Alert & responsive  HEENT: NCAT, EOMI, conjunctiva clear  CV: +S1+S2 regular rate and rhythm  Lung: clear to ausculation bilaterally, respirations nonlabored, good inspiratory effort  Abdomen: soft, Non tender, No Distension. Normal active BS  Extremities: no pedal edema or calf tenderness noted     LABS:                        12.3   4.69  )-----------( 227      ( 10 Apr 2018 04:01 )             37.3     04-10    140  |  109<H>  |  13  ----------------------------<  97  3.5   |  23  |  0.73    Ca    8.3<L>      10 Apr 2018 04:01  Phos  2.8     04-10  Mg     1.9     04-10          I&O's Detail    09 Apr 2018 07:01  -  10 Apr 2018 07:00  --------------------------------------------------------  IN:    dexmedetomidine Infusion: 19.5 mL    Oral Fluid: 810 mL    Sodium Chloride 0.9% IV Bolus: 1000 mL    sodium chloride 0.9%.: 900 mL  Total IN: 2729.5 mL    OUT:    Indwelling Catheter - Urethral: 1400 mL  Total OUT: 1400 mL    Total NET: 1329.5 mL      10 Apr 2018 07:01  -  10 Apr 2018 13:39  --------------------------------------------------------  IN:    Oral Fluid: 360 mL    sodium chloride 0.9%.: 375 mL  Total IN: 735 mL    OUT:    Indwelling Catheter - Urethral: 650 mL  Total OUT: 650 mL    Total NET: 85 mL        04-10 @ 04:01    140 | 109 | 13  /8.3 | 1.9 | 2.8  _______________________/  3.5 | 23 | 0.73                           \par

## 2018-04-10 NOTE — PROGRESS NOTE ADULT - SUBJECTIVE AND OBJECTIVE BOX
INTERVAL HPI/OVERNIGHT EVENTS:   HPI:  52 year old man with PMH gerd, HLD, etoh abuse presents with complaint of upper abdominal pain, nausea, and several episodes of emesis since yesterday morning.  He says he thinks he had EGD recently which was "ok" but he is not sure.  He is still drinking about 2 bottles of rum over a 3 day period every week.  He also says he is starting to feel tremors and has a headache.  He has not had a drink in about 2 days due to his abdominal pain.    In the ED, work up remarkable only for mild hypokalemia.  CT ab unchanged from previous, lactate initially elevated, normalized. (04 Apr 2018 03:16)      CENTRAL LINE: [ ] YES [ ] NO  LOCATION:   DATE INSERTED:  REMOVE: [ ] YES [ ] NO  EXPLAIN:    CHAN: [ ] YES [ ] NO    DATE INSERTED:  REMOVE:  [ ] YES [ ] NO  EXPLAIN:    A-LINE:  [ ] YES [ ] NO  LOCATION:   DATE INSERTED:  REMOVE:  [ ] YES [ ] NO  EXPLAIN:    PAST MEDICAL & SURGICAL HISTORY:  Mixed hyperlipidemia  PUD (peptic ulcer disease)  No significant past surgical history      REVIEW OF SYSTEMS:    CONSTITUTIONAL: No fever, weight loss, or fatigue  EYES: No eye pain, visual disturbances, or discharge  ENMT:  No difficulty hearing, tinnitus, vertigo; No sinus or throat pain  NECK: No pain or stiffness  BREASTS: No pain, masses, or nipple discharge  RESPIRATORY: No cough, wheezing, chills or hemoptysis; No shortness of breath  CARDIOVASCULAR: No chest pain, palpitations, dizziness, or leg swelling  GASTROINTESTINAL: No abdominal or epigastric pain. No nausea, vomiting, or hematemesis; No diarrhea or constipation. No melena or hematochezia.  GENITOURINARY: No dysuria, frequency, hematuria, or incontinence  NEUROLOGICAL: No headaches, memory loss, loss of strength, numbness, or tremors  SKIN: No itching, burning, rashes, or lesions   LYMPH NODES: No enlarged glands  ENDOCRINE: No heat or cold intolerance; No hair loss  MUSCULOSKELETAL: No joint pain or swelling; No muscle, back, or extremity pain  PSYCHIATRIC: No depression, anxiety, mood swings, or difficulty sleeping  HEME/LYMPH: No easy bruising, or bleeding gums  ALLERGY AND IMMUNOLOGIC: No hives or eczema      ICU Vital Signs Last 24 Hrs  T(C): 36.1 (10 Apr 2018 07:00), Max: 36.9 (09 Apr 2018 23:00)  T(F): 97 (10 Apr 2018 07:00), Max: 98.4 (09 Apr 2018 23:00)  HR: 88 (10 Apr 2018 11:00) (54 - 107)  BP: 130/80 (10 Apr 2018 11:00) (93/66 - 147/85)  BP(mean): 91 (10 Apr 2018 11:00) (73 - 112)  ABP: --  ABP(mean): --  RR: 20 (10 Apr 2018 11:00) (11 - 24)  SpO2: 100% (10 Apr 2018 11:00) (100% - 100%)          I&O's Detail    09 Apr 2018 07:01  -  10 Apr 2018 07:00  --------------------------------------------------------  IN:    dexmedetomidine Infusion: 19.5 mL    Oral Fluid: 810 mL    Sodium Chloride 0.9% IV Bolus: 1000 mL    sodium chloride 0.9%.: 900 mL  Total IN: 2729.5 mL    OUT:    Indwelling Catheter - Urethral: 1400 mL  Total OUT: 1400 mL    Total NET: 1329.5 mL      10 Apr 2018 07:01  -  10 Apr 2018 12:59  --------------------------------------------------------  IN:    Oral Fluid: 360 mL    sodium chloride 0.9%.: 375 mL  Total IN: 735 mL    OUT:    Indwelling Catheter - Urethral: 650 mL  Total OUT: 650 mL    Total NET: 85 mL            CAPILLARY BLOOD GLUCOSE        PHYSICAL EXAM:    GENERAL: NAD, well-groomed, well-developed  HEAD:  Atraumatic, Normocephalic  EYES: EOMI, PERRLA, conjunctiva and sclera clear  ENMT: No tonsillar erythema, exudates, or enlargement; Moist mucous membranes, Good dentition, No lesions  NECK: Supple, No JVD, Normal thyroid  NERVOUS SYSTEM:  Alert & Oriented X3, Good concentration; Motor Strength 5/5 B/L upper and lower extremities; DTRs 2+ intact and symmetric  CHEST/LUNG: Clear to percussion bilaterally; No rales, rhonchi, wheezing, or rubs  HEART: Regular rate and rhythm; No murmurs, rubs, or gallops  ABDOMEN: Soft, Nontender, Nondistended; Bowel sounds present  EXTREMITIES:  2+ Peripheral Pulses, No clubbing, cyanosis, or edema  LYMPH: No lymphadenopathy noted  SKIN: No rashes or lesions      LABS:                        12.3   4.69  )-----------( 227      ( 10 Apr 2018 04:01 )             37.3      04-10    140  |  109<H>  |  13  ----------------------------<  97  3.5   |  23  |  0.73    Ca    8.3<L>      10 Apr 2018 04:01  Phos  2.8     04-10  Mg     1.9     04-10              RADIOLOGY & ADDITIONAL STUDIES:      Assessment and Plan:    CRITICAL CARE TIME SPENT: INTERVAL HPI/OVERNIGHT EVENTS:   HPI:  52 year old man with PMH gerd, HLD, etoh abuse presents with complaint of upper abdominal pain, nausea, and several episodes of emesis since yesterday morning.  He says he thinks he had EGD recently which was "ok" but he is not sure.  He is still drinking about 2 bottles of rum over a 3 day period every week.  He also says he is starting to feel tremors and has a headache.  He has not had a drink in about 2 days due to his abdominal pain.    In the ED, work up remarkable only for mild hypokalemia.  CT ab unchanged from previous, lactate initially elevated, normalized. (04 Apr 2018 03:16)  Did well overnight, calm and comfortable off precedex        PAST MEDICAL & SURGICAL HISTORY:  Mixed hyperlipidemia  PUD (peptic ulcer disease)  No significant past surgical history      REVIEW OF SYSTEMS:        ICU Vital Signs Last 24 Hrs  T(C): 36.1 (10 Apr 2018 07:00), Max: 36.9 (09 Apr 2018 23:00)  T(F): 97 (10 Apr 2018 07:00), Max: 98.4 (09 Apr 2018 23:00)  HR: 88 (10 Apr 2018 11:00) (54 - 107)  BP: 130/80 (10 Apr 2018 11:00) (93/66 - 147/85)  BP(mean): 91 (10 Apr 2018 11:00) (73 - 112)  ABP: --  ABP(mean): --  RR: 20 (10 Apr 2018 11:00) (11 - 24)  SpO2: 100% (10 Apr 2018 11:00) (100% - 100%)          I&O's Detail    09 Apr 2018 07:01  -  10 Apr 2018 07:00  --------------------------------------------------------  IN:    dexmedetomidine Infusion: 19.5 mL    Oral Fluid: 810 mL    Sodium Chloride 0.9% IV Bolus: 1000 mL    sodium chloride 0.9%.: 900 mL  Total IN: 2729.5 mL    OUT:    Indwelling Catheter - Urethral: 1400 mL  Total OUT: 1400 mL    Total NET: 1329.5 mL      10 Apr 2018 07:01  -  10 Apr 2018 12:59  --------------------------------------------------------  IN:    Oral Fluid: 360 mL    sodium chloride 0.9%.: 375 mL  Total IN: 735 mL    OUT:    Indwelling Catheter - Urethral: 650 mL  Total OUT: 650 mL    Total NET: 85 mL            CAPILLARY BLOOD GLUCOSE        PHYSICAL EXAM:    GENERAL: NAD, well-groomed, well-developed AA0 x3  HEAD:  Atraumatic, Normocephalic     NECK: Supple, No JVD, Normal thyroid  NERVOUS SYSTEM:  Alert & Oriented X3, Good concentration; Motor Strength 5/5 B/L upper and lower extremities; DTRs 2+ intact and symmetric  CHEST/LUNG: Clear to percussion bilaterally; No rales, rhonchi, wheezing, or rubs  HEART: Regular rate and rhythm; No murmurs, rubs, or gallops  ABDOMEN: Soft, Nontender, Nondistended; Bowel sounds present    LABS:                        12.3   4.69  )-----------( 227      ( 10 Apr 2018 04:01 )             37.3      04-10    140  |  109<H>  |  13  ----------------------------<  97  3.5   |  23  |  0.73    Ca    8.3<L>      10 Apr 2018 04:01  Phos  2.8     04-10  Mg     1.9     04-10              RADIOLOGY & ADDITIONAL STUDIES:      Assessment and Plan:    CRITICAL CARE TIME SPENT:

## 2018-04-11 ENCOUNTER — TRANSCRIPTION ENCOUNTER (OUTPATIENT)
Age: 53
End: 2018-04-11

## 2018-04-11 VITALS
OXYGEN SATURATION: 97 % | RESPIRATION RATE: 18 BRPM | TEMPERATURE: 98 F | HEART RATE: 77 BPM | SYSTOLIC BLOOD PRESSURE: 136 MMHG | DIASTOLIC BLOOD PRESSURE: 86 MMHG

## 2018-04-11 LAB
ANION GAP SERPL CALC-SCNC: 7 MMOL/L — SIGNIFICANT CHANGE UP (ref 5–17)
BUN SERPL-MCNC: 10 MG/DL — SIGNIFICANT CHANGE UP (ref 7–23)
CALCIUM SERPL-MCNC: 8.6 MG/DL — SIGNIFICANT CHANGE UP (ref 8.5–10.1)
CHLORIDE SERPL-SCNC: 107 MMOL/L — SIGNIFICANT CHANGE UP (ref 96–108)
CO2 SERPL-SCNC: 26 MMOL/L — SIGNIFICANT CHANGE UP (ref 22–31)
CREAT SERPL-MCNC: 0.78 MG/DL — SIGNIFICANT CHANGE UP (ref 0.5–1.3)
GLUCOSE SERPL-MCNC: 88 MG/DL — SIGNIFICANT CHANGE UP (ref 70–99)
HCT VFR BLD CALC: 41.5 % — SIGNIFICANT CHANGE UP (ref 39–50)
HGB BLD-MCNC: 13.4 G/DL — SIGNIFICANT CHANGE UP (ref 13–17)
MAGNESIUM SERPL-MCNC: 2.1 MG/DL — SIGNIFICANT CHANGE UP (ref 1.6–2.6)
MCHC RBC-ENTMCNC: 28.7 PG — SIGNIFICANT CHANGE UP (ref 27–34)
MCHC RBC-ENTMCNC: 32.3 GM/DL — SIGNIFICANT CHANGE UP (ref 32–36)
MCV RBC AUTO: 88.9 FL — SIGNIFICANT CHANGE UP (ref 80–100)
NRBC # BLD: 0 /100 WBCS — SIGNIFICANT CHANGE UP (ref 0–0)
PHOSPHATE SERPL-MCNC: 3.5 MG/DL — SIGNIFICANT CHANGE UP (ref 2.5–4.5)
PLATELET # BLD AUTO: 219 K/UL — SIGNIFICANT CHANGE UP (ref 150–400)
POTASSIUM SERPL-MCNC: 3.6 MMOL/L — SIGNIFICANT CHANGE UP (ref 3.5–5.3)
POTASSIUM SERPL-SCNC: 3.6 MMOL/L — SIGNIFICANT CHANGE UP (ref 3.5–5.3)
RBC # BLD: 4.67 M/UL — SIGNIFICANT CHANGE UP (ref 4.2–5.8)
RBC # FLD: 13.1 % — SIGNIFICANT CHANGE UP (ref 10.3–14.5)
SODIUM SERPL-SCNC: 140 MMOL/L — SIGNIFICANT CHANGE UP (ref 135–145)
WBC # BLD: 3.97 K/UL — SIGNIFICANT CHANGE UP (ref 3.8–10.5)
WBC # FLD AUTO: 3.97 K/UL — SIGNIFICANT CHANGE UP (ref 3.8–10.5)

## 2018-04-11 PROCEDURE — 99239 HOSP IP/OBS DSCHRG MGMT >30: CPT

## 2018-04-11 RX ORDER — THIAMINE MONONITRATE (VIT B1) 100 MG
1 TABLET ORAL
Qty: 30 | Refills: 0
Start: 2018-04-11 | End: 2018-05-10

## 2018-04-11 RX ORDER — FOLIC ACID 0.8 MG
1 TABLET ORAL
Qty: 30 | Refills: 0
Start: 2018-04-11 | End: 2018-05-10

## 2018-04-11 RX ORDER — PANTOPRAZOLE SODIUM 20 MG/1
1 TABLET, DELAYED RELEASE ORAL
Qty: 30 | Refills: 0
Start: 2018-04-11 | End: 2018-05-10

## 2018-04-11 RX ADMIN — Medication 1 TABLET(S): at 11:26

## 2018-04-11 RX ADMIN — PANTOPRAZOLE SODIUM 40 MILLIGRAM(S): 20 TABLET, DELAYED RELEASE ORAL at 06:21

## 2018-04-11 RX ADMIN — HEPARIN SODIUM 5000 UNIT(S): 5000 INJECTION INTRAVENOUS; SUBCUTANEOUS at 06:20

## 2018-04-11 RX ADMIN — PANTOPRAZOLE SODIUM 40 MILLIGRAM(S): 20 TABLET, DELAYED RELEASE ORAL at 17:40

## 2018-04-11 RX ADMIN — Medication 1 MILLIGRAM(S): at 06:20

## 2018-04-11 RX ADMIN — Medication 1 MILLIGRAM(S): at 02:34

## 2018-04-11 RX ADMIN — Medication 2 MILLIGRAM(S): at 13:28

## 2018-04-11 RX ADMIN — Medication 100 MILLIGRAM(S): at 11:26

## 2018-04-11 RX ADMIN — Medication 1 GRAM(S): at 06:20

## 2018-04-11 RX ADMIN — Medication 1 MILLIGRAM(S): at 11:26

## 2018-04-11 RX ADMIN — Medication 1 MILLIGRAM(S): at 14:41

## 2018-04-11 RX ADMIN — Medication 1 GRAM(S): at 17:40

## 2018-04-11 RX ADMIN — DIPHENHYDRAMINE HYDROCHLORIDE AND LIDOCAINE HYDROCHLORIDE AND ALUMINUM HYDROXIDE AND MAGNESIUM HYDRO 5 MILLILITER(S): KIT at 14:38

## 2018-04-11 RX ADMIN — DIPHENHYDRAMINE HYDROCHLORIDE AND LIDOCAINE HYDROCHLORIDE AND ALUMINUM HYDROXIDE AND MAGNESIUM HYDRO 5 MILLILITER(S): KIT at 06:20

## 2018-04-11 RX ADMIN — Medication 1 MILLIGRAM(S): at 10:54

## 2018-04-11 RX ADMIN — Medication 2 MILLIGRAM(S): at 08:02

## 2018-04-11 RX ADMIN — Medication 1 GRAM(S): at 11:26

## 2018-04-11 NOTE — DISCHARGE NOTE ADULT - PLAN OF CARE
stop drinking alcohol you should follow-up with outpatient alcohol rehabilitation resolved continue with carafate and protonix, follow-up with your gastroenterologist resolving, mild cognitive delays - chronic, acute resolved continue with thiamine continue with folic acid

## 2018-04-11 NOTE — PROGRESS NOTE ADULT - PROVIDER SPECIALTY LIST ADULT
Critical Care
Gastroenterology
Hospitalist
Internal Medicine
Critical Care

## 2018-04-11 NOTE — DISCHARGE NOTE ADULT - CARE PLAN
Principal Discharge DX:	Alcohol withdrawal delirium  Goal:	stop drinking alcohol  Assessment and plan of treatment:	you should follow-up with outpatient alcohol rehabilitation  Secondary Diagnosis:	Hypokalemia  Assessment and plan of treatment:	resolved  Secondary Diagnosis:	Alcoholic gastritis without bleeding, unspecified chronicity  Assessment and plan of treatment:	continue with carafate and protonix, follow-up with your gastroenterologist  Secondary Diagnosis:	Acute metabolic encephalopathy  Assessment and plan of treatment:	resolving, mild cognitive delays - chronic, acute resolved  Secondary Diagnosis:	Thiamine deficiency  Assessment and plan of treatment:	continue with thiamine  Secondary Diagnosis:	Folate deficiency  Assessment and plan of treatment:	continue with folic acid

## 2018-04-11 NOTE — DISCHARGE NOTE ADULT - SECONDARY DIAGNOSIS.
Hypokalemia Alcoholic gastritis without bleeding, unspecified chronicity Acute metabolic encephalopathy Thiamine deficiency Folate deficiency

## 2018-04-11 NOTE — DISCHARGE NOTE ADULT - HOSPITAL COURSE
52 year old man with PMH gerd, HLD, etoh abuse presents with complaint of upper abdominal pain, nausea, and several episodes of emesis since yesterday morning.  He says he thinks he had EGD recently which was "ok" but he is not sure.  He is still drinking about 2 bottles of rum over a 3 day period every week.  He also says he is starting to feel tremors and has a headache.  He has not had a drink in about 2 days due to his abdominal pain.    In the ED, work up remarkable only for mild hypokalemia.  CT ab unchanged from previous, lactate initially elevated, normalized. (04 Apr 2018 03:16)  Pt. required precedex in icu for alcohol withdrawal, now with CIWA of 1 (mild confusion), acute encephalopathy resolving, Abdominal pain likely 2/2 to gastritis, had recent negative GI work-up, H/H stable.   Pt. given outpatient and inpatient alcohol withdrawal facilities but patient seems to be in denial about his alcohol abuse

## 2018-04-11 NOTE — PROGRESS NOTE ADULT - ASSESSMENT
53 yo M hx gerd, etoh abuse admitted with abd pain and etoh withdrawals and tx to ICU for worsening withdrawal.  Pt downgrade to medical floor but ICU was called for re-consult due to elevated CIWA score.
51 y/o male with PMHx GERD, HLD, ETOH abuse, admitted for ETOH withdrawal, transferred to ICU requiring precedex for worsening withdrawals on 4/5/18, downgraded from ICU on 4/7/18, now with persistent agitation and confusion
52 year old man with PMH gerd, HLD, etoh abuse presents with complaint of upper abdominal pain, nausea, and several episodes of emesis since yesterday morning.  Pt will require admission for at least 2 midnights for ETOH withdrawal and GI follow up for gerd with esophagitis as imaged as detailed below:
53 yo M hx gerd, etoh abuse admitted with abd pain and etoh withdrawals and tx to ICU for worsening withdrawals.       cont mvi, thiamine, folate  cont precedex and ativan  if worsens may switch to phenobarb standing  monitor qtc  hold off on haldol  cont LR  cont ppi
53 yo M hx gerd, etoh abuse admitted with abd pain and etoh withdrawals and tx to ICU for worsening withdrawals. Today is more calm and compliant.       cont mvi, thiamine, folate  d/c precedex  cont ativan taper  monitor qtc  hold off on haldol  d/c LR and change to oral diet  cont ppi
Alcoholic Gastritis with Esophagitis
Alcoholic Gastritis with Esophagitis , with worsening of acute ETOH withdrawal

## 2018-04-11 NOTE — DISCHARGE NOTE ADULT - MEDICATION SUMMARY - MEDICATIONS TO TAKE
I will START or STAY ON the medications listed below when I get home from the hospital:    atorvastatin 20 mg oral tablet  -- 1 tab(s) by mouth once a day  -- Indication: For Mixed hyperlipidemia    sucralfate 1 g/10 mL oral suspension  -- 10 milliliter(s) by mouth 4 times a day  -- Indication: For Alcoholic gastritis    pantoprazole 40 mg oral delayed release tablet  -- 1 tab(s) by mouth once a day   -- Indication: For Acute alcoholic gastritis without hemorrhage    Multiple Vitamins oral tablet  -- 1 tab(s) by mouth once a day  -- Indication: For Alcohol withdrawal    thiamine 100 mg oral tablet  -- 1 tab(s) by mouth once a day  -- Indication: For Alcohol withdrawal    folic acid 1 mg oral tablet  -- 1 tab(s) by mouth once a day  -- Indication: For Alcohol withdrawal

## 2018-04-11 NOTE — PROGRESS NOTE ADULT - SUBJECTIVE AND OBJECTIVE BOX
Gastroenterology  Patient seen and examined bedside resting comfortably.  No complaints offered. No Abdominal pain  Denies nausea and vomiting. Tolerating diet.  Normal flatus/BM.     T(F): 97.5 (04-11-18 @ 11:29), Max: 98.6 (04-10-18 @ 20:38)  HR: 88 (04-11-18 @ 11:29) (63 - 104)  BP: 107/69 (04-11-18 @ 11:29) (99/76 - 147/92)  RR: 16 (04-11-18 @ 11:29) (10 - 27)  SpO2: 99% (04-11-18 @ 11:29) (83% - 100%)  Wt(kg): --  CAPILLARY BLOOD GLUCOSE    PHYSICAL EXAM:  General: NAD, WDWN.   Neuro:  Alert & responsive  HEENT: NCAT, EOMI, conjunctiva clear  CV: +S1+S2 regular rate and rhythm  Lung: clear to ausculation bilaterally, respirations nonlabored, good inspiratory effort  Abdomen: soft, Non tender, No Distension. Normal active BS  Extremities: no pedal edema or calf tenderness noted      LABS:                        13.4   3.97  )-----------( 219      ( 11 Apr 2018 06:26 )             41.5     04-11    140  |  107  |  10  ----------------------------<  88  3.6   |  26  |  0.78    Ca    8.6      11 Apr 2018 06:26  Phos  3.5     04-11  Mg     2.1     04-11          I&O's Detail    10 Apr 2018 07:01  -  11 Apr 2018 07:00  --------------------------------------------------------  IN:    Oral Fluid: 560 mL    sodium chloride 0.9%: 600 mL  Total IN: 1160 mL    OUT:    Indwelling Catheter - Urethral: 650 mL  Total OUT: 650 mL    Total NET: 510 mL        04-11 @ 06:26    140 | 107 | 10  /8.6 | 2.1 | 3.5  _______________________/  3.6 | 26 | 0.78                           \par

## 2018-04-11 NOTE — DISCHARGE NOTE ADULT - PATIENT PORTAL LINK FT
You can access the [facility] Patient Portal, offered by [facility], by registering with the following website: [email or web address]

## 2018-04-11 NOTE — PROGRESS NOTE ADULT - PROBLEM SELECTOR PROBLEM 1
Alcohol withdrawal
ETOH abuse
Acute alcoholic gastritis without hemorrhage
Alcohol withdrawal syndrome, with delirium
Alcohol withdrawal syndrome, with delirium
Alcoholic gastritis
ETOH abuse
ETOH abuse

## 2018-04-13 DIAGNOSIS — G93.41 METABOLIC ENCEPHALOPATHY: ICD-10-CM

## 2018-04-13 DIAGNOSIS — K27.9 PEPTIC ULCER, SITE UNSPECIFIED, UNSPECIFIED AS ACUTE OR CHRONIC, WITHOUT HEMORRHAGE OR PERFORATION: ICD-10-CM

## 2018-04-13 DIAGNOSIS — K29.20 ALCOHOLIC GASTRITIS WITHOUT BLEEDING: ICD-10-CM

## 2018-04-13 DIAGNOSIS — E87.6 HYPOKALEMIA: ICD-10-CM

## 2018-04-13 DIAGNOSIS — F10.231 ALCOHOL DEPENDENCE WITH WITHDRAWAL DELIRIUM: ICD-10-CM

## 2018-04-13 DIAGNOSIS — E53.8 DEFICIENCY OF OTHER SPECIFIED B GROUP VITAMINS: ICD-10-CM

## 2018-04-13 DIAGNOSIS — E78.2 MIXED HYPERLIPIDEMIA: ICD-10-CM

## 2018-04-13 DIAGNOSIS — K21.0 GASTRO-ESOPHAGEAL REFLUX DISEASE WITH ESOPHAGITIS: ICD-10-CM

## 2018-04-13 DIAGNOSIS — E51.9 THIAMINE DEFICIENCY, UNSPECIFIED: ICD-10-CM

## 2018-04-16 DIAGNOSIS — R69 ILLNESS, UNSPECIFIED: ICD-10-CM

## 2018-05-02 ENCOUNTER — EMERGENCY (EMERGENCY)
Facility: HOSPITAL | Age: 53
LOS: 0 days | Discharge: HOME | End: 2018-05-02
Attending: EMERGENCY MEDICINE
Payer: MEDICAID

## 2018-05-02 VITALS
TEMPERATURE: 99 F | RESPIRATION RATE: 19 BRPM | HEART RATE: 123 BPM | HEIGHT: 67 IN | OXYGEN SATURATION: 95 % | WEIGHT: 175.05 LBS | DIASTOLIC BLOOD PRESSURE: 94 MMHG | SYSTOLIC BLOOD PRESSURE: 136 MMHG

## 2018-05-02 VITALS
HEART RATE: 95 BPM | RESPIRATION RATE: 18 BRPM | TEMPERATURE: 98 F | OXYGEN SATURATION: 96 % | DIASTOLIC BLOOD PRESSURE: 72 MMHG | SYSTOLIC BLOOD PRESSURE: 132 MMHG

## 2018-05-02 LAB
ALBUMIN SERPL ELPH-MCNC: 4.1 G/DL — SIGNIFICANT CHANGE UP (ref 3.3–5)
ALP SERPL-CCNC: 57 U/L — SIGNIFICANT CHANGE UP (ref 40–120)
ALT FLD-CCNC: 59 U/L — SIGNIFICANT CHANGE UP (ref 12–78)
ANION GAP SERPL CALC-SCNC: 17 MMOL/L — SIGNIFICANT CHANGE UP (ref 5–17)
APAP SERPL-MCNC: < 2 UG/ML (ref 10–30)
APTT BLD: 27.3 SEC — LOW (ref 27.5–37.4)
AST SERPL-CCNC: 73 U/L — HIGH (ref 15–37)
BASOPHILS # BLD AUTO: 0.05 K/UL — SIGNIFICANT CHANGE UP (ref 0–0.2)
BASOPHILS NFR BLD AUTO: 0.8 % — SIGNIFICANT CHANGE UP (ref 0–2)
BILIRUB SERPL-MCNC: 0.4 MG/DL — SIGNIFICANT CHANGE UP (ref 0.2–1.2)
BUN SERPL-MCNC: 11 MG/DL — SIGNIFICANT CHANGE UP (ref 7–23)
CALCIUM SERPL-MCNC: 9.1 MG/DL — SIGNIFICANT CHANGE UP (ref 8.5–10.1)
CHLORIDE SERPL-SCNC: 107 MMOL/L — SIGNIFICANT CHANGE UP (ref 96–108)
CK MB CFR SERPL CALC: 0.9 NG/ML — SIGNIFICANT CHANGE UP (ref 0.5–3.6)
CO2 SERPL-SCNC: 20 MMOL/L — LOW (ref 22–31)
CREAT SERPL-MCNC: 0.77 MG/DL — SIGNIFICANT CHANGE UP (ref 0.5–1.3)
EOSINOPHIL # BLD AUTO: 0.04 K/UL — SIGNIFICANT CHANGE UP (ref 0–0.5)
EOSINOPHIL NFR BLD AUTO: 0.7 % — SIGNIFICANT CHANGE UP (ref 0–6)
ETHANOL SERPL-MCNC: 300 MG/DL — HIGH (ref 0–10)
GLUCOSE BLDC GLUCOMTR-MCNC: 95 MG/DL — SIGNIFICANT CHANGE UP (ref 70–99)
GLUCOSE SERPL-MCNC: 88 MG/DL — SIGNIFICANT CHANGE UP (ref 70–99)
HCT VFR BLD CALC: 47.2 % — SIGNIFICANT CHANGE UP (ref 39–50)
HGB BLD-MCNC: 15.5 G/DL — SIGNIFICANT CHANGE UP (ref 13–17)
IMM GRANULOCYTES NFR BLD AUTO: 0.2 % — SIGNIFICANT CHANGE UP (ref 0–1.5)
INR BLD: 0.99 RATIO — SIGNIFICANT CHANGE UP (ref 0.88–1.16)
LIDOCAIN IGE QN: 351 U/L — SIGNIFICANT CHANGE UP (ref 73–393)
LYMPHOCYTES # BLD AUTO: 1.2 K/UL — SIGNIFICANT CHANGE UP (ref 1–3.3)
LYMPHOCYTES # BLD AUTO: 19.8 % — SIGNIFICANT CHANGE UP (ref 13–44)
MAGNESIUM SERPL-MCNC: 1.9 MG/DL — SIGNIFICANT CHANGE UP (ref 1.6–2.6)
MCHC RBC-ENTMCNC: 28.2 PG — SIGNIFICANT CHANGE UP (ref 27–34)
MCHC RBC-ENTMCNC: 32.8 GM/DL — SIGNIFICANT CHANGE UP (ref 32–36)
MCV RBC AUTO: 85.8 FL — SIGNIFICANT CHANGE UP (ref 80–100)
MONOCYTES # BLD AUTO: 0.22 K/UL — SIGNIFICANT CHANGE UP (ref 0–0.9)
MONOCYTES NFR BLD AUTO: 3.6 % — SIGNIFICANT CHANGE UP (ref 2–14)
NEUTROPHILS # BLD AUTO: 4.53 K/UL — SIGNIFICANT CHANGE UP (ref 1.8–7.4)
NEUTROPHILS NFR BLD AUTO: 74.9 % — SIGNIFICANT CHANGE UP (ref 43–77)
NRBC # BLD: 0 /100 WBCS — SIGNIFICANT CHANGE UP (ref 0–0)
NT-PROBNP SERPL-SCNC: <5 PG/ML — SIGNIFICANT CHANGE UP (ref 0–125)
PLATELET # BLD AUTO: 214 K/UL — SIGNIFICANT CHANGE UP (ref 150–400)
POTASSIUM SERPL-MCNC: 4.1 MMOL/L — SIGNIFICANT CHANGE UP (ref 3.5–5.3)
POTASSIUM SERPL-SCNC: 4.1 MMOL/L — SIGNIFICANT CHANGE UP (ref 3.5–5.3)
PROT SERPL-MCNC: 8.4 GM/DL — HIGH (ref 6–8.3)
PROTHROM AB SERPL-ACNC: 10.8 SEC — SIGNIFICANT CHANGE UP (ref 9.8–12.7)
RBC # BLD: 5.5 M/UL — SIGNIFICANT CHANGE UP (ref 4.2–5.8)
RBC # FLD: 13.2 % — SIGNIFICANT CHANGE UP (ref 10.3–14.5)
SALICYLATES SERPL-MCNC: <1.7 MG/DL — LOW (ref 2.8–20)
SODIUM SERPL-SCNC: 144 MMOL/L — SIGNIFICANT CHANGE UP (ref 135–145)
TROPONIN I SERPL-MCNC: <.015 NG/ML — SIGNIFICANT CHANGE UP (ref 0.01–0.04)
WBC # BLD: 6.05 K/UL — SIGNIFICANT CHANGE UP (ref 3.8–10.5)
WBC # FLD AUTO: 6.05 K/UL — SIGNIFICANT CHANGE UP (ref 3.8–10.5)

## 2018-05-02 PROCEDURE — 71045 X-RAY EXAM CHEST 1 VIEW: CPT | Mod: 26

## 2018-05-02 PROCEDURE — 93010 ELECTROCARDIOGRAM REPORT: CPT

## 2018-05-02 PROCEDURE — 99285 EMERGENCY DEPT VISIT HI MDM: CPT

## 2018-05-02 PROCEDURE — 74177 CT ABD & PELVIS W/CONTRAST: CPT | Mod: 26

## 2018-05-02 RX ORDER — MORPHINE SULFATE 50 MG/1
4 CAPSULE, EXTENDED RELEASE ORAL ONCE
Qty: 0 | Refills: 0 | Status: DISCONTINUED | OUTPATIENT
Start: 2018-05-02 | End: 2018-05-02

## 2018-05-02 RX ORDER — SODIUM CHLORIDE 9 MG/ML
1000 INJECTION INTRAMUSCULAR; INTRAVENOUS; SUBCUTANEOUS ONCE
Qty: 0 | Refills: 0 | Status: COMPLETED | OUTPATIENT
Start: 2018-05-02 | End: 2018-05-02

## 2018-05-02 RX ORDER — LIDOCAINE 4 G/100G
10 CREAM TOPICAL ONCE
Qty: 0 | Refills: 0 | Status: COMPLETED | OUTPATIENT
Start: 2018-05-02 | End: 2018-05-02

## 2018-05-02 RX ORDER — ONDANSETRON 8 MG/1
4 TABLET, FILM COATED ORAL ONCE
Qty: 0 | Refills: 0 | Status: COMPLETED | OUTPATIENT
Start: 2018-05-02 | End: 2018-05-02

## 2018-05-02 RX ORDER — IOHEXOL 300 MG/ML
30 INJECTION, SOLUTION INTRAVENOUS ONCE
Qty: 0 | Refills: 0 | Status: COMPLETED | OUTPATIENT
Start: 2018-05-02 | End: 2018-05-02

## 2018-05-02 RX ORDER — FAMOTIDINE 10 MG/ML
20 INJECTION INTRAVENOUS ONCE
Qty: 0 | Refills: 0 | Status: COMPLETED | OUTPATIENT
Start: 2018-05-02 | End: 2018-05-02

## 2018-05-02 RX ORDER — OXYCODONE AND ACETAMINOPHEN 5; 325 MG/1; MG/1
1 TABLET ORAL ONCE
Qty: 0 | Refills: 0 | Status: DISCONTINUED | OUTPATIENT
Start: 2018-05-02 | End: 2018-05-02

## 2018-05-02 RX ORDER — FAMOTIDINE 10 MG/ML
20 INJECTION INTRAVENOUS ONCE
Qty: 0 | Refills: 0 | Status: DISCONTINUED | OUTPATIENT
Start: 2018-05-02 | End: 2018-05-02

## 2018-05-02 RX ADMIN — FAMOTIDINE 104 MILLIGRAM(S): 10 INJECTION INTRAVENOUS at 16:15

## 2018-05-02 RX ADMIN — IOHEXOL 30 MILLILITER(S): 300 INJECTION, SOLUTION INTRAVENOUS at 09:40

## 2018-05-02 RX ADMIN — SODIUM CHLORIDE 1000 MILLILITER(S): 9 INJECTION INTRAMUSCULAR; INTRAVENOUS; SUBCUTANEOUS at 11:30

## 2018-05-02 RX ADMIN — SODIUM CHLORIDE 1000 MILLILITER(S): 9 INJECTION INTRAMUSCULAR; INTRAVENOUS; SUBCUTANEOUS at 09:39

## 2018-05-02 RX ADMIN — Medication 30 MILLILITER(S): at 16:15

## 2018-05-02 RX ADMIN — LIDOCAINE 10 MILLILITER(S): 4 CREAM TOPICAL at 09:41

## 2018-05-02 RX ADMIN — MORPHINE SULFATE 4 MILLIGRAM(S): 50 CAPSULE, EXTENDED RELEASE ORAL at 13:38

## 2018-05-02 RX ADMIN — ONDANSETRON 4 MILLIGRAM(S): 8 TABLET, FILM COATED ORAL at 09:40

## 2018-05-02 RX ADMIN — FAMOTIDINE 104 MILLIGRAM(S): 10 INJECTION INTRAVENOUS at 09:40

## 2018-05-02 RX ADMIN — MORPHINE SULFATE 4 MILLIGRAM(S): 50 CAPSULE, EXTENDED RELEASE ORAL at 11:27

## 2018-05-02 RX ADMIN — Medication 30 MILLILITER(S): at 09:40

## 2018-05-02 RX ADMIN — LIDOCAINE 10 MILLILITER(S): 4 CREAM TOPICAL at 16:15

## 2018-05-02 RX ADMIN — OXYCODONE AND ACETAMINOPHEN 1 TABLET(S): 5; 325 TABLET ORAL at 16:15

## 2018-05-02 NOTE — ED PROVIDER NOTE - MEDICAL DECISION MAKING DETAILS
ddx: Esophagitis/ gastritis/ no hematemesis to suggest boerhaave/ no evidence of withdrawal  Plan: cbc, cmp, troponin, ecg, cxr, gi cocktail, ct abd, reassess

## 2018-05-02 NOTE — ED PROVIDER NOTE - OBJECTIVE STATEMENT
Pt is a 52 yo gentleman with a pmhx of HL, GERD, etoh abuse who presents to the ED with epigastric pain and esophageal pain. Has been feeling burning pain for the past 3-4 days. Has not had alcohol for the same amount of time. Has abdominal buring. Has had vomiting, non bloody, no bilious. Has had similar pain in the past. No willie chest pain,n o sob, no fevers, no cough. Does not feel like he is in withdrawal, or shaking. Has had EGD 1-2 months ago w Gi doctor, showing esophagitis.

## 2018-05-02 NOTE — ED ADULT TRIAGE NOTE - CHIEF COMPLAINT QUOTE
patient c/o of chest pain radiating in L shoulder and abdominal pain started 3 days ago , patient c/o of N/V , c/o of dizziness , patient stated he is drinking and the last drink was 3 days ago

## 2018-05-02 NOTE — ED PROVIDER NOTE - PROGRESS NOTE DETAILS
Pt CT shows improved distal esophagus. Pt has etoh level 300, pt says last drink was yesterday. Pt observed in ED, pain improved, now is tolerating po. Alert, oriented, steady gait, clinically sober now for discharge.

## 2018-05-03 DIAGNOSIS — R07.0 PAIN IN THROAT: ICD-10-CM

## 2018-05-03 DIAGNOSIS — K21.9 GASTRO-ESOPHAGEAL REFLUX DISEASE WITHOUT ESOPHAGITIS: ICD-10-CM

## 2018-05-03 DIAGNOSIS — R07.9 CHEST PAIN, UNSPECIFIED: ICD-10-CM

## 2018-05-03 DIAGNOSIS — R10.13 EPIGASTRIC PAIN: ICD-10-CM

## 2018-05-03 DIAGNOSIS — F10.10 ALCOHOL ABUSE, UNCOMPLICATED: ICD-10-CM

## 2018-05-24 ENCOUNTER — INPATIENT (INPATIENT)
Facility: HOSPITAL | Age: 53
LOS: 17 days | Discharge: HOME | End: 2018-06-11
Attending: INTERNAL MEDICINE | Admitting: INTERNAL MEDICINE
Payer: MEDICAID

## 2018-05-24 VITALS
SYSTOLIC BLOOD PRESSURE: 148 MMHG | DIASTOLIC BLOOD PRESSURE: 104 MMHG | OXYGEN SATURATION: 96 % | TEMPERATURE: 99 F | HEART RATE: 111 BPM | WEIGHT: 169.98 LBS | HEIGHT: 67 IN | RESPIRATION RATE: 21 BRPM

## 2018-05-24 LAB
ALBUMIN SERPL ELPH-MCNC: 4.4 G/DL — SIGNIFICANT CHANGE UP (ref 3.3–5)
ALP SERPL-CCNC: 61 U/L — SIGNIFICANT CHANGE UP (ref 40–120)
ALT FLD-CCNC: 49 U/L — SIGNIFICANT CHANGE UP (ref 12–78)
ANION GAP SERPL CALC-SCNC: 15 MMOL/L — SIGNIFICANT CHANGE UP (ref 5–17)
AST SERPL-CCNC: 45 U/L — HIGH (ref 15–37)
BASOPHILS # BLD AUTO: 0.07 K/UL — SIGNIFICANT CHANGE UP (ref 0–0.2)
BASOPHILS NFR BLD AUTO: 1 % — SIGNIFICANT CHANGE UP (ref 0–2)
BILIRUB SERPL-MCNC: 0.4 MG/DL — SIGNIFICANT CHANGE UP (ref 0.2–1.2)
BUN SERPL-MCNC: 8 MG/DL — SIGNIFICANT CHANGE UP (ref 7–23)
CALCIUM SERPL-MCNC: 9.3 MG/DL — SIGNIFICANT CHANGE UP (ref 8.5–10.1)
CHLORIDE SERPL-SCNC: 104 MMOL/L — SIGNIFICANT CHANGE UP (ref 96–108)
CK SERPL-CCNC: 423 U/L — HIGH (ref 26–308)
CO2 SERPL-SCNC: 24 MMOL/L — SIGNIFICANT CHANGE UP (ref 22–31)
CREAT SERPL-MCNC: 1.06 MG/DL — SIGNIFICANT CHANGE UP (ref 0.5–1.3)
EOSINOPHIL # BLD AUTO: 0.03 K/UL — SIGNIFICANT CHANGE UP (ref 0–0.5)
EOSINOPHIL NFR BLD AUTO: 0.4 % — SIGNIFICANT CHANGE UP (ref 0–6)
ETHANOL SERPL-MCNC: 228 MG/DL — HIGH (ref 0–10)
GLUCOSE SERPL-MCNC: 96 MG/DL — SIGNIFICANT CHANGE UP (ref 70–99)
HCT VFR BLD CALC: 50.1 % — HIGH (ref 39–50)
HGB BLD-MCNC: 16.8 G/DL — SIGNIFICANT CHANGE UP (ref 13–17)
IMM GRANULOCYTES NFR BLD AUTO: 0.4 % — SIGNIFICANT CHANGE UP (ref 0–1.5)
LACTATE SERPL-SCNC: 6.1 MMOL/L — CRITICAL HIGH (ref 0.7–2)
LIDOCAIN IGE QN: 268 U/L — SIGNIFICANT CHANGE UP (ref 73–393)
LYMPHOCYTES # BLD AUTO: 2.69 K/UL — SIGNIFICANT CHANGE UP (ref 1–3.3)
LYMPHOCYTES # BLD AUTO: 38.1 % — SIGNIFICANT CHANGE UP (ref 13–44)
MCHC RBC-ENTMCNC: 28.2 PG — SIGNIFICANT CHANGE UP (ref 27–34)
MCHC RBC-ENTMCNC: 33.5 GM/DL — SIGNIFICANT CHANGE UP (ref 32–36)
MCV RBC AUTO: 84.2 FL — SIGNIFICANT CHANGE UP (ref 80–100)
MONOCYTES # BLD AUTO: 0.39 K/UL — SIGNIFICANT CHANGE UP (ref 0–0.9)
MONOCYTES NFR BLD AUTO: 5.5 % — SIGNIFICANT CHANGE UP (ref 2–14)
NEUTROPHILS # BLD AUTO: 3.85 K/UL — SIGNIFICANT CHANGE UP (ref 1.8–7.4)
NEUTROPHILS NFR BLD AUTO: 54.6 % — SIGNIFICANT CHANGE UP (ref 43–77)
NRBC # BLD: 0 /100 WBCS — SIGNIFICANT CHANGE UP (ref 0–0)
PLATELET # BLD AUTO: 342 K/UL — SIGNIFICANT CHANGE UP (ref 150–400)
POTASSIUM SERPL-MCNC: 4.3 MMOL/L — SIGNIFICANT CHANGE UP (ref 3.5–5.3)
POTASSIUM SERPL-SCNC: 4.3 MMOL/L — SIGNIFICANT CHANGE UP (ref 3.5–5.3)
PROT SERPL-MCNC: 9.7 GM/DL — HIGH (ref 6–8.3)
RBC # BLD: 5.95 M/UL — HIGH (ref 4.2–5.8)
RBC # FLD: 13.3 % — SIGNIFICANT CHANGE UP (ref 10.3–14.5)
SODIUM SERPL-SCNC: 143 MMOL/L — SIGNIFICANT CHANGE UP (ref 135–145)
WBC # BLD: 7.06 K/UL — SIGNIFICANT CHANGE UP (ref 3.8–10.5)
WBC # FLD AUTO: 7.06 K/UL — SIGNIFICANT CHANGE UP (ref 3.8–10.5)

## 2018-05-24 PROCEDURE — 99285 EMERGENCY DEPT VISIT HI MDM: CPT

## 2018-05-24 PROCEDURE — 74177 CT ABD & PELVIS W/CONTRAST: CPT | Mod: 26

## 2018-05-24 RX ORDER — MORPHINE SULFATE 50 MG/1
4 CAPSULE, EXTENDED RELEASE ORAL ONCE
Qty: 0 | Refills: 0 | Status: DISCONTINUED | OUTPATIENT
Start: 2018-05-24 | End: 2018-05-24

## 2018-05-24 RX ORDER — SODIUM CHLORIDE 9 MG/ML
2000 INJECTION INTRAMUSCULAR; INTRAVENOUS; SUBCUTANEOUS ONCE
Qty: 0 | Refills: 0 | Status: COMPLETED | OUTPATIENT
Start: 2018-05-24 | End: 2018-05-24

## 2018-05-24 RX ORDER — SODIUM CHLORIDE 9 MG/ML
3000 INJECTION INTRAMUSCULAR; INTRAVENOUS; SUBCUTANEOUS ONCE
Qty: 0 | Refills: 0 | Status: COMPLETED | OUTPATIENT
Start: 2018-05-24 | End: 2018-05-24

## 2018-05-24 RX ORDER — ONDANSETRON 8 MG/1
8 TABLET, FILM COATED ORAL ONCE
Qty: 0 | Refills: 0 | Status: COMPLETED | OUTPATIENT
Start: 2018-05-24 | End: 2018-05-24

## 2018-05-24 RX ORDER — SODIUM CHLORIDE 9 MG/ML
3 INJECTION INTRAMUSCULAR; INTRAVENOUS; SUBCUTANEOUS ONCE
Qty: 0 | Refills: 0 | Status: COMPLETED | OUTPATIENT
Start: 2018-05-24 | End: 2018-05-24

## 2018-05-24 RX ORDER — IOHEXOL 300 MG/ML
30 INJECTION, SOLUTION INTRAVENOUS ONCE
Qty: 0 | Refills: 0 | Status: COMPLETED | OUTPATIENT
Start: 2018-05-24 | End: 2018-05-24

## 2018-05-24 RX ORDER — PANTOPRAZOLE SODIUM 20 MG/1
40 TABLET, DELAYED RELEASE ORAL ONCE
Qty: 0 | Refills: 0 | Status: COMPLETED | OUTPATIENT
Start: 2018-05-24 | End: 2018-05-24

## 2018-05-24 RX ORDER — SUCRALFATE 1 G
1 TABLET ORAL ONCE
Qty: 0 | Refills: 0 | Status: COMPLETED | OUTPATIENT
Start: 2018-05-24 | End: 2018-05-24

## 2018-05-24 RX ADMIN — SODIUM CHLORIDE 2000 MILLILITER(S): 9 INJECTION INTRAMUSCULAR; INTRAVENOUS; SUBCUTANEOUS at 23:20

## 2018-05-24 RX ADMIN — MORPHINE SULFATE 4 MILLIGRAM(S): 50 CAPSULE, EXTENDED RELEASE ORAL at 20:51

## 2018-05-24 RX ADMIN — MORPHINE SULFATE 4 MILLIGRAM(S): 50 CAPSULE, EXTENDED RELEASE ORAL at 23:20

## 2018-05-24 RX ADMIN — MORPHINE SULFATE 4 MILLIGRAM(S): 50 CAPSULE, EXTENDED RELEASE ORAL at 20:56

## 2018-05-24 RX ADMIN — ONDANSETRON 8 MILLIGRAM(S): 8 TABLET, FILM COATED ORAL at 20:29

## 2018-05-24 RX ADMIN — PANTOPRAZOLE SODIUM 40 MILLIGRAM(S): 20 TABLET, DELAYED RELEASE ORAL at 20:13

## 2018-05-24 RX ADMIN — SODIUM CHLORIDE 3 MILLILITER(S): 9 INJECTION INTRAMUSCULAR; INTRAVENOUS; SUBCUTANEOUS at 20:13

## 2018-05-24 RX ADMIN — MORPHINE SULFATE 4 MILLIGRAM(S): 50 CAPSULE, EXTENDED RELEASE ORAL at 20:13

## 2018-05-24 RX ADMIN — SODIUM CHLORIDE 3000 MILLILITER(S): 9 INJECTION INTRAMUSCULAR; INTRAVENOUS; SUBCUTANEOUS at 20:14

## 2018-05-24 RX ADMIN — IOHEXOL 30 MILLILITER(S): 300 INJECTION, SOLUTION INTRAVENOUS at 20:32

## 2018-05-24 NOTE — ED ADULT NURSE NOTE - OBJECTIVE STATEMENT
54 yo male c/o L side abd pain. active vomiting. pt reports he has been drinking for 4-5 days, Bacardi. last drink last night.

## 2018-05-24 NOTE — ED PROVIDER NOTE - OBJECTIVE STATEMENT
54yo male with pmh gastric ulcer, presents with whole body pain, upper abd pain, mult vomiting, and diarrhea for past 3 days. Pt has been on a drinking binge for past week. + chills.     ROS: No fever/chills. No photophobia/eye pain/changes in vision, No ear pain/sore throat/dysphagia, No chest pain/palpitations. No SOB/cough/stridor. + abdominal pain, +N/V/D, no black/bloody bm. No dysuria/frequency/discharge, No Dizziness.  No rash.  No numbness/tingling/weakness. 54yo male with pmh gastric ulcer/gastritis, alcohol abuse, presents with whole body pain, upper abd pain, mult vomiting, and diarrhea for past 3 days. Pt has been on a drinking binge for past week. + chills.     ROS: No fever/chills. No photophobia/eye pain/changes in vision, No ear pain/sore throat/dysphagia, No chest pain/palpitations. No SOB/cough/stridor. + abdominal pain, +N/V/D, no black/bloody bm. No dysuria/frequency/discharge, No Dizziness.  No rash.  No numbness/tingling/weakness.

## 2018-05-24 NOTE — ED PROVIDER NOTE - CARE PLAN
Principal Discharge DX:	Gastritis  Secondary Diagnosis:	Lactic acidosis  Secondary Diagnosis:	Alcohol withdrawal

## 2018-05-24 NOTE — PROVIDER CONTACT NOTE (CRITICAL VALUE NOTIFICATION) - BACKGROUND
pt c/o abd pain. ETOH x 4-5 days , last drink yesterday. VS STABLE AFEBRILE CALL DE PAZ IN REACH, SAFETY MAINTAINED PT PLACED ON TELE

## 2018-05-24 NOTE — ED PROVIDER NOTE - PHYSICAL EXAMINATION
Gen: Alert, Well appearing. NAD  , + vomiting  Head: NC, AT, PERRL, EOMI, normal lids/conjunctiva   ENT: Bilateral TM WNL, normal hearing, patent oropharynx without erythema/exudate, uvula midline  Neck: supple, no tenderness/meningismus/JVD   Pulm: Bilateral clear BS, normal resp effort, no wheeze/stridor/retractions  CV: RRR, no M/R/G, +dist pulses   Abd: soft, gen tender, ND, +BS, no guarding/rebound tenderness  Mskel: no edema/erythema/cyanosis   Skin: no rash   Neuro: AAOx3, no sensory/motor deficits, CN 2-12 intact

## 2018-05-24 NOTE — ED PROVIDER NOTE - MEDICAL DECISION MAKING DETAILS
Pt s/p 5L but still with lactic acidosis. CT scan demonstrates no acute pathology. + gastritis. + withdrawal. admit. will admit for iv hydration, repeat lactic, pain control and withdrawal. d/w dr. bowles for admission.

## 2018-05-25 DIAGNOSIS — Z29.9 ENCOUNTER FOR PROPHYLACTIC MEASURES, UNSPECIFIED: ICD-10-CM

## 2018-05-25 DIAGNOSIS — F10.230 ALCOHOL DEPENDENCE WITH WITHDRAWAL, UNCOMPLICATED: ICD-10-CM

## 2018-05-25 DIAGNOSIS — E87.2 ACIDOSIS: ICD-10-CM

## 2018-05-25 DIAGNOSIS — K29.20 ALCOHOLIC GASTRITIS WITHOUT BLEEDING: ICD-10-CM

## 2018-05-25 LAB
ALBUMIN SERPL ELPH-MCNC: 3.4 G/DL — SIGNIFICANT CHANGE UP (ref 3.3–5)
ALP SERPL-CCNC: 48 U/L — SIGNIFICANT CHANGE UP (ref 40–120)
ALT FLD-CCNC: 37 U/L — SIGNIFICANT CHANGE UP (ref 12–78)
ANION GAP SERPL CALC-SCNC: 9 MMOL/L — SIGNIFICANT CHANGE UP (ref 5–17)
APPEARANCE UR: CLEAR — SIGNIFICANT CHANGE UP
AST SERPL-CCNC: 36 U/L — SIGNIFICANT CHANGE UP (ref 15–37)
BILIRUB SERPL-MCNC: 1.1 MG/DL — SIGNIFICANT CHANGE UP (ref 0.2–1.2)
BILIRUB UR-MCNC: NEGATIVE — SIGNIFICANT CHANGE UP
BUN SERPL-MCNC: 6 MG/DL — LOW (ref 7–23)
CALCIUM SERPL-MCNC: 7.5 MG/DL — LOW (ref 8.5–10.1)
CHLORIDE SERPL-SCNC: 108 MMOL/L — SIGNIFICANT CHANGE UP (ref 96–108)
CO2 SERPL-SCNC: 27 MMOL/L — SIGNIFICANT CHANGE UP (ref 22–31)
COLOR SPEC: YELLOW — SIGNIFICANT CHANGE UP
CREAT SERPL-MCNC: 0.66 MG/DL — SIGNIFICANT CHANGE UP (ref 0.5–1.3)
DIFF PNL FLD: NEGATIVE — SIGNIFICANT CHANGE UP
GLUCOSE SERPL-MCNC: 81 MG/DL — SIGNIFICANT CHANGE UP (ref 70–99)
GLUCOSE UR QL: NEGATIVE MG/DL — SIGNIFICANT CHANGE UP
HCT VFR BLD CALC: 40.2 % — SIGNIFICANT CHANGE UP (ref 39–50)
HGB BLD-MCNC: 13.3 G/DL — SIGNIFICANT CHANGE UP (ref 13–17)
KETONES UR-MCNC: ABNORMAL
LACTATE SERPL-SCNC: 0.9 MMOL/L — SIGNIFICANT CHANGE UP (ref 0.7–2)
LACTATE SERPL-SCNC: 3.2 MMOL/L — HIGH (ref 0.7–2)
LEUKOCYTE ESTERASE UR-ACNC: NEGATIVE — SIGNIFICANT CHANGE UP
MAGNESIUM SERPL-MCNC: 1.7 MG/DL — SIGNIFICANT CHANGE UP (ref 1.6–2.6)
MCHC RBC-ENTMCNC: 29 PG — SIGNIFICANT CHANGE UP (ref 27–34)
MCHC RBC-ENTMCNC: 33.1 GM/DL — SIGNIFICANT CHANGE UP (ref 32–36)
MCV RBC AUTO: 87.6 FL — SIGNIFICANT CHANGE UP (ref 80–100)
NITRITE UR-MCNC: NEGATIVE — SIGNIFICANT CHANGE UP
NRBC # BLD: 0 /100 WBCS — SIGNIFICANT CHANGE UP (ref 0–0)
PH UR: 8 — SIGNIFICANT CHANGE UP (ref 5–8)
PHOSPHATE SERPL-MCNC: 2.3 MG/DL — LOW (ref 2.5–4.5)
PLATELET # BLD AUTO: 221 K/UL — SIGNIFICANT CHANGE UP (ref 150–400)
POTASSIUM SERPL-MCNC: 3.6 MMOL/L — SIGNIFICANT CHANGE UP (ref 3.5–5.3)
POTASSIUM SERPL-SCNC: 3.6 MMOL/L — SIGNIFICANT CHANGE UP (ref 3.5–5.3)
PROT SERPL-MCNC: 6.7 GM/DL — SIGNIFICANT CHANGE UP (ref 6–8.3)
PROT UR-MCNC: 30 MG/DL
RBC # BLD: 4.59 M/UL — SIGNIFICANT CHANGE UP (ref 4.2–5.8)
RBC # FLD: 13.5 % — SIGNIFICANT CHANGE UP (ref 10.3–14.5)
RBC CASTS # UR COMP ASSIST: SIGNIFICANT CHANGE UP /HPF (ref 0–4)
SODIUM SERPL-SCNC: 144 MMOL/L — SIGNIFICANT CHANGE UP (ref 135–145)
SP GR SPEC: 1.01 — SIGNIFICANT CHANGE UP (ref 1.01–1.02)
UROBILINOGEN FLD QL: NEGATIVE MG/DL — SIGNIFICANT CHANGE UP
WBC # BLD: 5.88 K/UL — SIGNIFICANT CHANGE UP (ref 3.8–10.5)
WBC # FLD AUTO: 5.88 K/UL — SIGNIFICANT CHANGE UP (ref 3.8–10.5)

## 2018-05-25 PROCEDURE — 99223 1ST HOSP IP/OBS HIGH 75: CPT

## 2018-05-25 PROCEDURE — 12345: CPT | Mod: NC

## 2018-05-25 RX ORDER — SENNA PLUS 8.6 MG/1
2 TABLET ORAL AT BEDTIME
Qty: 0 | Refills: 0 | Status: DISCONTINUED | OUTPATIENT
Start: 2018-05-25 | End: 2018-05-26

## 2018-05-25 RX ORDER — SODIUM CHLORIDE 9 MG/ML
1000 INJECTION INTRAMUSCULAR; INTRAVENOUS; SUBCUTANEOUS
Qty: 0 | Refills: 0 | Status: DISCONTINUED | OUTPATIENT
Start: 2018-05-25 | End: 2018-05-25

## 2018-05-25 RX ORDER — POLYETHYLENE GLYCOL 3350 17 G/17G
17 POWDER, FOR SOLUTION ORAL DAILY
Qty: 0 | Refills: 0 | Status: DISCONTINUED | OUTPATIENT
Start: 2018-05-25 | End: 2018-06-11

## 2018-05-25 RX ORDER — DOCUSATE SODIUM 100 MG
100 CAPSULE ORAL
Qty: 0 | Refills: 0 | Status: DISCONTINUED | OUTPATIENT
Start: 2018-05-25 | End: 2018-05-26

## 2018-05-25 RX ORDER — PANTOPRAZOLE SODIUM 20 MG/1
40 TABLET, DELAYED RELEASE ORAL
Qty: 0 | Refills: 0 | Status: DISCONTINUED | OUTPATIENT
Start: 2018-05-25 | End: 2018-05-25

## 2018-05-25 RX ORDER — FOLIC ACID 0.8 MG
1 TABLET ORAL DAILY
Qty: 0 | Refills: 0 | Status: DISCONTINUED | OUTPATIENT
Start: 2018-05-25 | End: 2018-05-26

## 2018-05-25 RX ORDER — PANTOPRAZOLE SODIUM 20 MG/1
40 TABLET, DELAYED RELEASE ORAL
Qty: 0 | Refills: 0 | Status: DISCONTINUED | OUTPATIENT
Start: 2018-05-25 | End: 2018-05-26

## 2018-05-25 RX ORDER — POTASSIUM PHOSPHATE, MONOBASIC POTASSIUM PHOSPHATE, DIBASIC 236; 224 MG/ML; MG/ML
15 INJECTION, SOLUTION INTRAVENOUS ONCE
Qty: 0 | Refills: 0 | Status: COMPLETED | OUTPATIENT
Start: 2018-05-25 | End: 2018-05-25

## 2018-05-25 RX ORDER — ONDANSETRON 8 MG/1
8 TABLET, FILM COATED ORAL EVERY 8 HOURS
Qty: 0 | Refills: 0 | Status: DISCONTINUED | OUTPATIENT
Start: 2018-05-25 | End: 2018-05-26

## 2018-05-25 RX ORDER — THIAMINE MONONITRATE (VIT B1) 100 MG
100 TABLET ORAL DAILY
Qty: 0 | Refills: 0 | Status: DISCONTINUED | OUTPATIENT
Start: 2018-05-25 | End: 2018-05-26

## 2018-05-25 RX ORDER — SODIUM CHLORIDE 9 MG/ML
1000 INJECTION, SOLUTION INTRAVENOUS
Qty: 0 | Refills: 0 | Status: DISCONTINUED | OUTPATIENT
Start: 2018-05-25 | End: 2018-05-26

## 2018-05-25 RX ORDER — SUCRALFATE 1 G
1 TABLET ORAL EVERY 6 HOURS
Qty: 0 | Refills: 0 | Status: DISCONTINUED | OUTPATIENT
Start: 2018-05-25 | End: 2018-06-03

## 2018-05-25 RX ADMIN — MORPHINE SULFATE 4 MILLIGRAM(S): 50 CAPSULE, EXTENDED RELEASE ORAL at 00:40

## 2018-05-25 RX ADMIN — Medication 30 MILLILITER(S): at 00:25

## 2018-05-25 RX ADMIN — Medication 4 MILLIGRAM(S): at 14:21

## 2018-05-25 RX ADMIN — SODIUM CHLORIDE 100 MILLILITER(S): 9 INJECTION, SOLUTION INTRAVENOUS at 10:27

## 2018-05-25 RX ADMIN — SODIUM CHLORIDE 100 MILLILITER(S): 9 INJECTION INTRAMUSCULAR; INTRAVENOUS; SUBCUTANEOUS at 06:37

## 2018-05-25 RX ADMIN — Medication 2 MILLIGRAM(S): at 06:25

## 2018-05-25 RX ADMIN — SODIUM CHLORIDE 100 MILLILITER(S): 9 INJECTION, SOLUTION INTRAVENOUS at 12:31

## 2018-05-25 RX ADMIN — POTASSIUM PHOSPHATE, MONOBASIC POTASSIUM PHOSPHATE, DIBASIC 63.75 MILLIMOLE(S): 236; 224 INJECTION, SOLUTION INTRAVENOUS at 12:31

## 2018-05-25 RX ADMIN — PANTOPRAZOLE SODIUM 40 MILLIGRAM(S): 20 TABLET, DELAYED RELEASE ORAL at 17:46

## 2018-05-25 RX ADMIN — Medication 1 TABLET(S): at 12:30

## 2018-05-25 RX ADMIN — Medication 2 MILLIGRAM(S): at 20:46

## 2018-05-25 RX ADMIN — Medication 2 MILLIGRAM(S): at 03:17

## 2018-05-25 RX ADMIN — Medication 1 GRAM(S): at 17:46

## 2018-05-25 RX ADMIN — Medication 2 MILLIGRAM(S): at 10:33

## 2018-05-25 RX ADMIN — Medication 100 MILLIGRAM(S): at 17:46

## 2018-05-25 RX ADMIN — Medication 4 MILLIGRAM(S): at 23:34

## 2018-05-25 RX ADMIN — MORPHINE SULFATE 4 MILLIGRAM(S): 50 CAPSULE, EXTENDED RELEASE ORAL at 00:25

## 2018-05-25 RX ADMIN — Medication 100 MILLIGRAM(S): at 12:30

## 2018-05-25 RX ADMIN — PANTOPRAZOLE SODIUM 40 MILLIGRAM(S): 20 TABLET, DELAYED RELEASE ORAL at 06:37

## 2018-05-25 RX ADMIN — Medication 1 GRAM(S): at 12:29

## 2018-05-25 RX ADMIN — Medication 4 MILLIGRAM(S): at 17:46

## 2018-05-25 RX ADMIN — Medication 1 MILLIGRAM(S): at 12:29

## 2018-05-25 NOTE — H&P ADULT - HISTORY OF PRESENT ILLNESS
Admitted for ETOH withdrawals, in progress. 53 year old man with PMH gerd, HLD, etoh abuse presents with complaint of tremors, abdominal pain, nausea, and body aches.  He states he stopped drinking yesterday and this feels typical of his withdrawals.  He feels agitated and anxious, has been sweating.    In the ED, work up consistent with gastritis with esophagitis, CIWA 10.  Lactate 6.1-->3.2.

## 2018-05-25 NOTE — CHART NOTE - NSCHARTNOTEFT_GEN_A_CORE
53 year old man with PMH gerd, HLD, etoh abuse presents with complaint of tremors, abdominal pain, nausea, and body aches.  He states he stopped drinking yesterday and this feels typical of his withdrawals.  He feels agitated and anxious, has been sweating.    In the ED, work up consistent with gastritis with esophagitis, CIWA 10.  Lactate 6.1-->3.2. (25 May 2018 03:23)    Lactic acidosis resolving with iv fluids, given pt. high risk , has had previous admissions to ICU on precedex for increase ativan dosages to 4mg and add prn.   social work consult  Pt. seen and examined - c/o abdominal pain, no nausea or vomiting, no hematemesis,   GI consult - [facility] has seen in past.  monitor electrolytes  hypophosphatemia - repleted  presumed thiamine deficiency - thiamine supplement  presumed folate deficiency - folic acid supplement  acute toxic metabolic encephalopathy - withdrawal protocal.

## 2018-05-25 NOTE — H&P ADULT - ASSESSMENT
53 year old man with PMH gerd, HLD, etoh abuse presents with complaint of tremors, abdominal pain, nausea, and body aches.  Pt will require admission for ETOH withdrawal as detailed below:    IMPROVE VTE Individual Risk Assessment          RISK                                                          Points    [  ] Previous VTE                                                3    [  ] Thrombophilia                                             2    [  ] Lower limb paralysis                                    2        (unable to hold up >15 seconds)      [  ] Current Cancer                                             2         (within 6 months)    [ x ] Immobilization > 24 hrs                              1    [  ] ICU/CCU stay > 24 hours                            1    [  ] Age > 60                                                    1    IMPROVE VTE Score ____1_____

## 2018-05-25 NOTE — H&P ADULT - NSHPLABSRESULTS_GEN_ALL_CORE
Vital Signs Last 24 Hrs  T(C): 36.9 (25 May 2018 00:29), Max: 37.2 (24 May 2018 19:38)  T(F): 98.4 (25 May 2018 00:29), Max: 98.9 (24 May 2018 19:38)  HR: 72 (25 May 2018 00:29) (72 - 111)  BP: 138/90 (25 May 2018 00:29) (138/90 - 148/104)  BP(mean): --  RR: 18 (25 May 2018 00:29) (18 - 21)  SpO2: 94% (25 May 2018 00:29) (94% - 96%)      LABS:                        16.8   7.06  )-----------( 342      ( 24 May 2018 20:21 )             50.1     05-24    143  |  104  |  8   ----------------------------<  96  4.3   |  24  |  1.06    Ca    9.3      24 May 2018 20:21    TPro  9.7<H>  /  Alb  4.4  /  TBili  0.4  /  DBili  x   /  AST  45<H>  /  ALT  49  /  AlkPhos  61  05-24      Urinalysis Basic - ( 25 May 2018 02:00 )    Color: Yellow / Appearance: Clear / S.010 / pH: x  Gluc: x / Ketone: Trace  / Bili: Negative / Urobili: Negative mg/dL   Blood: x / Protein: 30 mg/dL / Nitrite: Negative   Leuk Esterase: Negative / RBC: 0-2 /HPF / WBC x   Sq Epi: x / Non Sq Epi: x / Bacteria: x        RADIOLOGY & ADDITIONAL STUDIES:    CT ab/plv:  IMPRESSION:         Mild distal esophageal wall thickening, suggestive of esophagitis.    Correlate   clinically.

## 2018-05-25 NOTE — H&P ADULT - NSHPREVIEWOFSYSTEMS_GEN_ALL_CORE
No fever/chills, No photophobia/eye pain/changes in vision,  No chest pain/palpitations, no SOB/cough/wheeze/stridor, no dysuria/frequency/discharge, No neck/back pain, no rash, no changes in neurological status/function.

## 2018-05-25 NOTE — H&P ADULT - NSHPPHYSICALEXAM_GEN_ALL_CORE
GENERAL: NAD, disheveled, well-developed  HEAD:  Atraumatic, Normocephalic  EYES: EOMI, PERRLA, conjunctiva and sclera clear  ENMT: No tonsillar erythema, exudates, or enlargement; Moist mucous membranes, No lesions  NECK: Supple, No JVD, Normal thyroid  NERVOUS SYSTEM:  Alert & Oriented X3, Good concentration, tremor b/l UE  CHEST/LUNG: Clear to ascultation bilaterally; No rales, rhonchi, wheezing, or rubs  HEART: Regular rate and rhythm; No murmurs, rubs, or gallops  ABDOMEN: Soft, Nontender, Nondistended; Bowel sounds present  EXTREMITIES: no clubbing, cyanosis, or edema  SKIN: diaphoretic  PSYCH: anxious

## 2018-05-26 DIAGNOSIS — F10.239 ALCOHOL DEPENDENCE WITH WITHDRAWAL, UNSPECIFIED: ICD-10-CM

## 2018-05-26 DIAGNOSIS — F10.231 ALCOHOL DEPENDENCE WITH WITHDRAWAL DELIRIUM: ICD-10-CM

## 2018-05-26 LAB
ANION GAP SERPL CALC-SCNC: 13 MMOL/L — SIGNIFICANT CHANGE UP (ref 5–17)
BUN SERPL-MCNC: 7 MG/DL — SIGNIFICANT CHANGE UP (ref 7–23)
CALCIUM SERPL-MCNC: 9 MG/DL — SIGNIFICANT CHANGE UP (ref 8.5–10.1)
CHLORIDE SERPL-SCNC: 105 MMOL/L — SIGNIFICANT CHANGE UP (ref 96–108)
CO2 SERPL-SCNC: 22 MMOL/L — SIGNIFICANT CHANGE UP (ref 22–31)
CREAT SERPL-MCNC: 0.99 MG/DL — SIGNIFICANT CHANGE UP (ref 0.5–1.3)
CULTURE RESULTS: NO GROWTH — SIGNIFICANT CHANGE UP
GLUCOSE SERPL-MCNC: 101 MG/DL — HIGH (ref 70–99)
HCT VFR BLD CALC: 40.9 % — SIGNIFICANT CHANGE UP (ref 39–50)
HGB BLD-MCNC: 13.4 G/DL — SIGNIFICANT CHANGE UP (ref 13–17)
MAGNESIUM SERPL-MCNC: 1.9 MG/DL — SIGNIFICANT CHANGE UP (ref 1.6–2.6)
MCHC RBC-ENTMCNC: 28.5 PG — SIGNIFICANT CHANGE UP (ref 27–34)
MCHC RBC-ENTMCNC: 32.8 GM/DL — SIGNIFICANT CHANGE UP (ref 32–36)
MCV RBC AUTO: 87 FL — SIGNIFICANT CHANGE UP (ref 80–100)
NRBC # BLD: 0 /100 WBCS — SIGNIFICANT CHANGE UP (ref 0–0)
PHOSPHATE SERPL-MCNC: 2 MG/DL — LOW (ref 2.5–4.5)
PLATELET # BLD AUTO: 215 K/UL — SIGNIFICANT CHANGE UP (ref 150–400)
POTASSIUM SERPL-MCNC: 3.5 MMOL/L — SIGNIFICANT CHANGE UP (ref 3.5–5.3)
POTASSIUM SERPL-SCNC: 3.5 MMOL/L — SIGNIFICANT CHANGE UP (ref 3.5–5.3)
RBC # BLD: 4.7 M/UL — SIGNIFICANT CHANGE UP (ref 4.2–5.8)
RBC # FLD: 12.9 % — SIGNIFICANT CHANGE UP (ref 10.3–14.5)
SODIUM SERPL-SCNC: 140 MMOL/L — SIGNIFICANT CHANGE UP (ref 135–145)
SPECIMEN SOURCE: SIGNIFICANT CHANGE UP
WBC # BLD: 6.33 K/UL — SIGNIFICANT CHANGE UP (ref 3.8–10.5)
WBC # FLD AUTO: 6.33 K/UL — SIGNIFICANT CHANGE UP (ref 3.8–10.5)

## 2018-05-26 PROCEDURE — 99291 CRITICAL CARE FIRST HOUR: CPT

## 2018-05-26 RX ORDER — DEXMEDETOMIDINE HYDROCHLORIDE IN 0.9% SODIUM CHLORIDE 4 UG/ML
0.5 INJECTION INTRAVENOUS
Qty: 200 | Refills: 0 | Status: DISCONTINUED | OUTPATIENT
Start: 2018-05-26 | End: 2018-06-02

## 2018-05-26 RX ORDER — PHENOBARBITAL 60 MG
260 TABLET ORAL ONCE
Qty: 0 | Refills: 0 | Status: DISCONTINUED | OUTPATIENT
Start: 2018-05-26 | End: 2018-05-26

## 2018-05-26 RX ORDER — CHLORHEXIDINE GLUCONATE 213 G/1000ML
1 SOLUTION TOPICAL
Qty: 0 | Refills: 0 | Status: DISCONTINUED | OUTPATIENT
Start: 2018-05-26 | End: 2018-05-27

## 2018-05-26 RX ORDER — SODIUM CHLORIDE 9 MG/ML
1000 INJECTION, SOLUTION INTRAVENOUS
Qty: 0 | Refills: 0 | Status: COMPLETED | OUTPATIENT
Start: 2018-05-26 | End: 2018-05-26

## 2018-05-26 RX ORDER — PHENOBARBITAL 60 MG
65 TABLET ORAL ONCE
Qty: 0 | Refills: 0 | Status: DISCONTINUED | OUTPATIENT
Start: 2018-05-26 | End: 2018-05-26

## 2018-05-26 RX ORDER — PHENOBARBITAL 60 MG
50 TABLET ORAL ONCE
Qty: 0 | Refills: 0 | Status: DISCONTINUED | OUTPATIENT
Start: 2018-05-26 | End: 2018-05-26

## 2018-05-26 RX ORDER — SODIUM CHLORIDE 9 MG/ML
1000 INJECTION, SOLUTION INTRAVENOUS
Qty: 0 | Refills: 0 | Status: DISCONTINUED | OUTPATIENT
Start: 2018-05-26 | End: 2018-05-30

## 2018-05-26 RX ORDER — PHENOBARBITAL 60 MG
130 TABLET ORAL EVERY 6 HOURS
Qty: 0 | Refills: 0 | Status: DISCONTINUED | OUTPATIENT
Start: 2018-05-26 | End: 2018-06-02

## 2018-05-26 RX ORDER — PHENOBARBITAL 60 MG
260 TABLET ORAL
Qty: 0 | Refills: 0 | Status: DISCONTINUED | OUTPATIENT
Start: 2018-05-26 | End: 2018-06-01

## 2018-05-26 RX ORDER — SODIUM CHLORIDE 9 MG/ML
1000 INJECTION, SOLUTION INTRAVENOUS
Qty: 0 | Refills: 0 | Status: DISCONTINUED | OUTPATIENT
Start: 2018-05-27 | End: 2018-05-27

## 2018-05-26 RX ORDER — PHENOBARBITAL 60 MG
130 TABLET ORAL
Qty: 0 | Refills: 0 | Status: DISCONTINUED | OUTPATIENT
Start: 2018-05-26 | End: 2018-06-01

## 2018-05-26 RX ORDER — POTASSIUM PHOSPHATE, MONOBASIC POTASSIUM PHOSPHATE, DIBASIC 236; 224 MG/ML; MG/ML
15 INJECTION, SOLUTION INTRAVENOUS ONCE
Qty: 0 | Refills: 0 | Status: COMPLETED | OUTPATIENT
Start: 2018-05-26 | End: 2018-05-26

## 2018-05-26 RX ORDER — OLANZAPINE 15 MG/1
2.5 TABLET, FILM COATED ORAL ONCE
Qty: 0 | Refills: 0 | Status: COMPLETED | OUTPATIENT
Start: 2018-05-26 | End: 2018-05-26

## 2018-05-26 RX ADMIN — POTASSIUM PHOSPHATE, MONOBASIC POTASSIUM PHOSPHATE, DIBASIC 63.75 MILLIMOLE(S): 236; 224 INJECTION, SOLUTION INTRAVENOUS at 08:31

## 2018-05-26 RX ADMIN — DEXMEDETOMIDINE HYDROCHLORIDE IN 0.9% SODIUM CHLORIDE 9.64 MICROGRAM(S)/KG/HR: 4 INJECTION INTRAVENOUS at 04:00

## 2018-05-26 RX ADMIN — Medication 4 MILLIGRAM(S): at 05:15

## 2018-05-26 RX ADMIN — Medication 4 MILLIGRAM(S): at 21:21

## 2018-05-26 RX ADMIN — SODIUM CHLORIDE 100 MILLILITER(S): 9 INJECTION, SOLUTION INTRAVENOUS at 21:10

## 2018-05-26 RX ADMIN — Medication 130 MILLIGRAM(S): at 08:31

## 2018-05-26 RX ADMIN — Medication 260 MILLIGRAM(S): at 21:21

## 2018-05-26 RX ADMIN — Medication 130 MILLIGRAM(S): at 16:57

## 2018-05-26 RX ADMIN — Medication 2 MILLIGRAM(S): at 01:32

## 2018-05-26 RX ADMIN — Medication 130 MILLIGRAM(S): at 14:31

## 2018-05-26 RX ADMIN — Medication 65 MILLIGRAM(S): at 02:30

## 2018-05-26 RX ADMIN — OLANZAPINE 2.5 MILLIGRAM(S): 15 TABLET, FILM COATED ORAL at 03:50

## 2018-05-26 RX ADMIN — Medication 260 MILLIGRAM(S): at 05:15

## 2018-05-26 RX ADMIN — DEXMEDETOMIDINE HYDROCHLORIDE IN 0.9% SODIUM CHLORIDE 9.64 MICROGRAM(S)/KG/HR: 4 INJECTION INTRAVENOUS at 20:40

## 2018-05-26 RX ADMIN — SODIUM CHLORIDE 100 MILLILITER(S): 9 INJECTION, SOLUTION INTRAVENOUS at 11:47

## 2018-05-26 RX ADMIN — Medication 260 MILLIGRAM(S): at 18:50

## 2018-05-26 RX ADMIN — DEXMEDETOMIDINE HYDROCHLORIDE IN 0.9% SODIUM CHLORIDE 9.64 MICROGRAM(S)/KG/HR: 4 INJECTION INTRAVENOUS at 08:31

## 2018-05-26 NOTE — CONSULT NOTE ADULT - SUBJECTIVE AND OBJECTIVE BOX
Chief Complaint:  Patient is a 53y old  Male who presents with a chief complaint of ETOH withdrawal (25 May 2018 03:23)      HPI:  53 year old man with PMH gerd, HLD, etoh abuse presents with complaint of tremors, abdominal pain, nausea, and body aches.  He states he stopped drinking yesterday and this feels typical of his withdrawals.  He feels agitated and anxious, has been sweating.    PMH/PSH:PAST MEDICAL & SURGICAL HISTORY:  Mixed hyperlipidemia  PUD (peptic ulcer disease)  No significant past surgical history      Allergies:  No Known Allergies    Medications:  chlorhexidine 2% Cloths 1 Application(s) Topical two times a day  dexmedetomidine Infusion 0.5 MICROgram(s)/kG/Hr IV Continuous <Continuous>  PHENobarbital Injectable 130 milliGRAM(s) IV Push every 30 minutes PRN  PHENobarbital Injectable 260 milliGRAM(s) IV Push every 30 minutes PRN  PHENobarbital Injectable 130 milliGRAM(s) IV Push every 6 hours  polyethylene glycol 3350 17 Gram(s) Oral daily PRN  sodium chloride 0.45%. 1000 milliLiter(s) IV Continuous <Continuous>  sucralfate 1 Gram(s) Oral every 6 hours      Review of Systems:  unable to obtain, Sedated in ICU    Relevant Family History:   FAMILY HISTORY:  No pertinent family history in first degree relatives      Relevant Social History: + Extensive ETOH abuse history    Physical Exam:    Vital Signs:  Vital Signs Last 24 Hrs  T(C): 36.7 (26 May 2018 16:27), Max: 37.1 (26 May 2018 04:00)  T(F): 98 (26 May 2018 16:27), Max: 98.8 (26 May 2018 04:00)  HR: 67 (26 May 2018 15:00) (51 - 131)  BP: 145/95 (26 May 2018 15:00) (116/71 - 160/107)  BP(mean): 108 (26 May 2018 15:00) (92 - 121)  RR: 13 (26 May 2018 15:00) (12 - 33)  SpO2: 100% (26 May 2018 15:00) (96% - 100%)  Daily     Daily Weight in k.2 (26 May 2018 02:31)    General:  SEDATED on Precedex  in ICU  HEENT:  NC/AT,  conjunctivae clear and pink, no thyromegaly, nodules, adenopathy, no JVD  Chest:  Full & symmetric excursion, no increased effort, breath sounds clear  Cardiovascular:  Regular rhythm, S1, S2, no murmur/rub/S3/S4, no abdominal bruit, no edema  Abdomen:  Soft, non-tender, non-distended, normoactive bowel sounds,  no masses , no hepatosplenomegaly, no signs of chronic liver disease  Extremities:  no cyanosis, clubbing or edema  Skin:  No rash/erythema/ecchymoses/petechiae/wounds/abscess/warm/dry  Neuro/Psych:  unresponsive    Laboratory:                          13.4   6.33  )-----------( 215      ( 26 May 2018 05:26 )             40.9     -26    140  |  105  |  7   ----------------------------<  101<H>  3.5   |  22  |  0.99    Ca    9.0      26 May 2018 05:26  Phos  2.0       Mg     1.9         TPro  6.7  /  Alb  3.4  /  TBili  1.1  /  DBili  x   /  AST  36  /  ALT  37  /  AlkPhos  48      LIVER FUNCTIONS - ( 25 May 2018 09:00 )  Alb: 3.4 g/dL / Pro: 6.7 gm/dL / ALK PHOS: 48 U/L / ALT: 37 U/L / AST: 36 U/L / GGT: x             Urinalysis Basic - ( 25 May 2018 02:00 )    Color: Yellow / Appearance: Clear / S.010 / pH: x  Gluc: x / Ketone: Trace  / Bili: Negative / Urobili: Negative mg/dL   Blood: x / Protein: 30 mg/dL / Nitrite: Negative   Leuk Esterase: Negative / RBC: 0-2 /HPF / WBC x   Sq Epi: x / Non Sq Epi: x / Bacteria: x    Lipase [id number] U/L     Intake and Output    18 @ 07:01  -  18 @ 07:00  --------------------------------------------------------  IN: 291.6 mL / OUT: 0 mL / NET: 291.6 mL    18 @ 07:01  -  18 @ 16:31  --------------------------------------------------------  IN: 285.4 mL / OUT: 0 mL / NET: 285.4 mL        Imaging:    < from: CT Abdomen and Pelvis w/ Oral Cont and w/ IV Cont (18 @ 22:38) >    EXAM:  CT ABDOMEN AND PELVIS OC IC                            PROCEDURE DATE:  2018          INTERPRETATION:  CLINICAL INFORMATION: Abdominal pain and vomiting    COMPARISON: 5/3/2018    PROCEDURE:  CT of the Abdomen and Pelvis was performed with intravenous contrast.  Intravenous contrast: 96 ml Omnipaque 350. 4 ml discarded.  Oral contrast: None.  Sagittal and coronal reformats were performed.    FINDINGS:    LOWER CHEST: Basilar atelectasis. Stable distal esophageal wall   thickening.    LIVER: Diffuse low-attenuation, compatible with hepatic steatosis.  BILE DUCTS: Normal caliber.  GALLBLADDER: Within normal limits.  SPLEEN: Within normal limits.  PANCREAS: Within normal limits.  ADRENALS: Within normal limits.  KIDNEYS/URETERS: Within normal limits.    BLADDER: Within normal limits.  REPRODUCTIVE ORGANS: The prostate is within normal limits.    BOWEL: No bowel obstruction. Appendix within normal limits.  PERITONEUM: No ascites.  VESSELS:  Mild atherosclerotic changes.  RETROPERITONEUM: No lymphadenopathy.  ABDOMINAL WALL: Within normal limits.  BONES: Within normal limits.    IMPRESSION:    Hepatic steatosis.  Distal esophageal wall thickening, question esophagitis.                    FERNY MORE M.D., ATTENDING RADIOLOGIST  This document has been electronically signed. May 25 2018  8:11AM        < end of copied text >

## 2018-05-26 NOTE — CONSULT NOTE ADULT - ATTENDING COMMENTS
Pt is a 54 yo M with h/o HLD, GERD and ETOH abuse with prior admission 2 to ETOH w/drawal. Pt presented 2 to c/o tremors, abdominal pain, nausea, and body aches. Pt states he stopped drinking 5/24 and this feels agitated, anxious, has been sweating.like his typical withdrawal  He feels agitated and anxious, has been sweating.    In the ED, work up consistent with gastritis with esophagitis, CIWA 10.  Lactate 6.1-->3.2. (25 May 2018 03:23). Patient rapid response and code gray for increased agitation. Contacted by PAULA Parikh;  patient nonresponsive to Ativan protocol and ordered for Pt is a 52 yo M with h/o HLD, GERD and ETOH abuse with prior admission 2 to ETOH w/drawal. Pt presented 2 to c/o tremors, abdominal pain, nausea, and body aches. Pt states he stopped drinking 5/24 and he feels agitated, anxious and sweating like his typical withdrawal. Pt admitted to [facility] and RRT/ code gray called 2 to increased agitation.     Vs as per EMR    Lungs: Rhonchi  Heart: Alvarado  Abd: Soft/+BS  Ext: No edema  Neuro: Sedated    a/p: ETOH abuse with w/drawal/DTs    Resp: Elevate HOB  CVS: Alvarado 2 to Precedex  FEN: Daily Thiamine, MVI and Folate/ NPO until MS improves   Endo: Follow Glu  Neuro/Psych: Cont Phenobarb and decrease Precedex    CCT: 40 min

## 2018-05-26 NOTE — CONSULT NOTE ADULT - SUBJECTIVE AND OBJECTIVE BOX
HPI:  Day 2 admission for 53 year old man with PMH gerd, HLD, etoh abuse presents with complaint of tremors, abdominal pain, nausea, and body aches.  He states he stopped drinking yesterday and this feels typical of his withdrawals.  He feels agitated and anxious, has been sweating.    In the ED, work up consistent with gastritis with esophagitis, CIWA 10.  Lactate 6.1-->3.2. (25 May 2018 03:23). Patient rapid response and code gray for increased agitation. Contacted by PAULA Parikh;  patient nonresponsive to Ativan protocol and ordered for Valium    Patient combative requiring multiple security    ICU Vital Signs Last 24 Hrs  T(C): 36.8 (26 May 2018 05:35), Max: 37.1 (26 May 2018 04:00)  T(F): 98.2 (26 May 2018 05:35), Max: 98.8 (26 May 2018 04:00)  HR: 74 (26 May 2018 05:35) (67 - 131)  BP: 154/82 (26 May 2018 05:35) (90/55 - 160/107)  BP(mean): 121 (26 May 2018 03:30) (92 - 121)  RR: 21 (26 May 2018 05:35) (16 - 33)  SpO2: 97% (26 May 2018 05:35) (96% - 98%)    Patient agitated but able to be verbally directed  Gen: hyperemic  Lungs CTA  Cardiac S1/S2  Abd benign  Neuro alert disoriented      05-26    140  |  105  |  7   ----------------------------<  101<H>  3.5   |  22  |  0.99    Ca    9.0      26 May 2018 05:26  Phos  2.0     05-26  Mg     1.9     05-26    TPro  6.7  /  Alb  3.4  /  TBili  1.1  /  DBili  x   /  AST  36  /  ALT  37  /  AlkPhos  48  05-25

## 2018-05-27 DIAGNOSIS — K21.0 GASTRO-ESOPHAGEAL REFLUX DISEASE WITH ESOPHAGITIS: ICD-10-CM

## 2018-05-27 LAB
AMYLASE P1 CFR SERPL: 54 U/L — SIGNIFICANT CHANGE UP (ref 25–115)
ANION GAP SERPL CALC-SCNC: 11 MMOL/L — SIGNIFICANT CHANGE UP (ref 5–17)
ANION GAP SERPL CALC-SCNC: 12 MMOL/L — SIGNIFICANT CHANGE UP (ref 5–17)
BASOPHILS # BLD AUTO: 0.03 K/UL — SIGNIFICANT CHANGE UP (ref 0–0.2)
BASOPHILS NFR BLD AUTO: 0.3 % — SIGNIFICANT CHANGE UP (ref 0–2)
BUN SERPL-MCNC: 7 MG/DL — SIGNIFICANT CHANGE UP (ref 7–23)
BUN SERPL-MCNC: 9 MG/DL — SIGNIFICANT CHANGE UP (ref 7–23)
CALCIUM SERPL-MCNC: 6.8 MG/DL — LOW (ref 8.5–10.1)
CALCIUM SERPL-MCNC: 7.9 MG/DL — LOW (ref 8.5–10.1)
CHLORIDE SERPL-SCNC: 102 MMOL/L — SIGNIFICANT CHANGE UP (ref 96–108)
CHLORIDE SERPL-SCNC: 103 MMOL/L — SIGNIFICANT CHANGE UP (ref 96–108)
CO2 SERPL-SCNC: 21 MMOL/L — LOW (ref 22–31)
CO2 SERPL-SCNC: 23 MMOL/L — SIGNIFICANT CHANGE UP (ref 22–31)
CREAT SERPL-MCNC: 0.6 MG/DL — SIGNIFICANT CHANGE UP (ref 0.5–1.3)
CREAT SERPL-MCNC: 0.71 MG/DL — SIGNIFICANT CHANGE UP (ref 0.5–1.3)
EOSINOPHIL # BLD AUTO: 0.11 K/UL — SIGNIFICANT CHANGE UP (ref 0–0.5)
EOSINOPHIL NFR BLD AUTO: 1 % — SIGNIFICANT CHANGE UP (ref 0–6)
GLUCOSE SERPL-MCNC: 74 MG/DL — SIGNIFICANT CHANGE UP (ref 70–99)
GLUCOSE SERPL-MCNC: 82 MG/DL — SIGNIFICANT CHANGE UP (ref 70–99)
HCT VFR BLD CALC: 35.1 % — LOW (ref 39–50)
HCT VFR BLD CALC: 37.8 % — LOW (ref 39–50)
HGB BLD-MCNC: 11.4 G/DL — LOW (ref 13–17)
HGB BLD-MCNC: 12.1 G/DL — LOW (ref 13–17)
IMM GRANULOCYTES NFR BLD AUTO: 0.8 % — SIGNIFICANT CHANGE UP (ref 0–1.5)
LIDOCAIN IGE QN: 118 U/L — SIGNIFICANT CHANGE UP (ref 73–393)
LYMPHOCYTES # BLD AUTO: 1.13 K/UL — SIGNIFICANT CHANGE UP (ref 1–3.3)
LYMPHOCYTES # BLD AUTO: 10.5 % — LOW (ref 13–44)
MAGNESIUM SERPL-MCNC: 1.6 MG/DL — SIGNIFICANT CHANGE UP (ref 1.6–2.6)
MAGNESIUM SERPL-MCNC: 1.8 MG/DL — SIGNIFICANT CHANGE UP (ref 1.6–2.6)
MCHC RBC-ENTMCNC: 27.9 PG — SIGNIFICANT CHANGE UP (ref 27–34)
MCHC RBC-ENTMCNC: 28.4 PG — SIGNIFICANT CHANGE UP (ref 27–34)
MCHC RBC-ENTMCNC: 32 GM/DL — SIGNIFICANT CHANGE UP (ref 32–36)
MCHC RBC-ENTMCNC: 32.5 GM/DL — SIGNIFICANT CHANGE UP (ref 32–36)
MCV RBC AUTO: 87.3 FL — SIGNIFICANT CHANGE UP (ref 80–100)
MCV RBC AUTO: 87.5 FL — SIGNIFICANT CHANGE UP (ref 80–100)
MONOCYTES # BLD AUTO: 0.45 K/UL — SIGNIFICANT CHANGE UP (ref 0–0.9)
MONOCYTES NFR BLD AUTO: 4.2 % — SIGNIFICANT CHANGE UP (ref 2–14)
NEUTROPHILS # BLD AUTO: 8.95 K/UL — HIGH (ref 1.8–7.4)
NEUTROPHILS NFR BLD AUTO: 83.2 % — HIGH (ref 43–77)
NRBC # BLD: 0 /100 WBCS — SIGNIFICANT CHANGE UP (ref 0–0)
NRBC # BLD: 0 /100 WBCS — SIGNIFICANT CHANGE UP (ref 0–0)
PHOSPHATE SERPL-MCNC: 2.9 MG/DL — SIGNIFICANT CHANGE UP (ref 2.5–4.5)
PHOSPHATE SERPL-MCNC: 3.2 MG/DL — SIGNIFICANT CHANGE UP (ref 2.5–4.5)
PLATELET # BLD AUTO: 129 K/UL — LOW (ref 150–400)
PLATELET # BLD AUTO: 142 K/UL — LOW (ref 150–400)
POTASSIUM SERPL-MCNC: 2.9 MMOL/L — CRITICAL LOW (ref 3.5–5.3)
POTASSIUM SERPL-MCNC: 3.1 MMOL/L — LOW (ref 3.5–5.3)
POTASSIUM SERPL-SCNC: 2.9 MMOL/L — CRITICAL LOW (ref 3.5–5.3)
POTASSIUM SERPL-SCNC: 3.1 MMOL/L — LOW (ref 3.5–5.3)
RBC # BLD: 4.01 M/UL — LOW (ref 4.2–5.8)
RBC # BLD: 4.33 M/UL — SIGNIFICANT CHANGE UP (ref 4.2–5.8)
RBC # FLD: 12.9 % — SIGNIFICANT CHANGE UP (ref 10.3–14.5)
RBC # FLD: 13 % — SIGNIFICANT CHANGE UP (ref 10.3–14.5)
SODIUM SERPL-SCNC: 134 MMOL/L — LOW (ref 135–145)
SODIUM SERPL-SCNC: 138 MMOL/L — SIGNIFICANT CHANGE UP (ref 135–145)
WBC # BLD: 10.76 K/UL — HIGH (ref 3.8–10.5)
WBC # BLD: 9.77 K/UL — SIGNIFICANT CHANGE UP (ref 3.8–10.5)
WBC # FLD AUTO: 10.76 K/UL — HIGH (ref 3.8–10.5)
WBC # FLD AUTO: 9.77 K/UL — SIGNIFICANT CHANGE UP (ref 3.8–10.5)

## 2018-05-27 PROCEDURE — 99291 CRITICAL CARE FIRST HOUR: CPT

## 2018-05-27 RX ORDER — POTASSIUM CHLORIDE 20 MEQ
10 PACKET (EA) ORAL
Qty: 0 | Refills: 0 | Status: COMPLETED | OUTPATIENT
Start: 2018-05-27 | End: 2018-05-27

## 2018-05-27 RX ORDER — CHLORHEXIDINE GLUCONATE 213 G/1000ML
1 SOLUTION TOPICAL
Qty: 0 | Refills: 0 | Status: DISCONTINUED | OUTPATIENT
Start: 2018-05-27 | End: 2018-06-11

## 2018-05-27 RX ORDER — POTASSIUM CHLORIDE 20 MEQ
40 PACKET (EA) ORAL ONCE
Qty: 0 | Refills: 0 | Status: DISCONTINUED | OUTPATIENT
Start: 2018-05-27 | End: 2018-05-27

## 2018-05-27 RX ORDER — POTASSIUM CHLORIDE 20 MEQ
40 PACKET (EA) ORAL EVERY 4 HOURS
Qty: 0 | Refills: 0 | Status: COMPLETED | OUTPATIENT
Start: 2018-05-27 | End: 2018-05-27

## 2018-05-27 RX ADMIN — DEXMEDETOMIDINE HYDROCHLORIDE IN 0.9% SODIUM CHLORIDE 9.64 MICROGRAM(S)/KG/HR: 4 INJECTION INTRAVENOUS at 05:45

## 2018-05-27 RX ADMIN — Medication 130 MILLIGRAM(S): at 14:19

## 2018-05-27 RX ADMIN — Medication 1 GRAM(S): at 23:19

## 2018-05-27 RX ADMIN — Medication 130 MILLIGRAM(S): at 19:56

## 2018-05-27 RX ADMIN — Medication 100 MILLIEQUIVALENT(S): at 10:30

## 2018-05-27 RX ADMIN — Medication 130 MILLIGRAM(S): at 06:30

## 2018-05-27 RX ADMIN — Medication 100 MILLIEQUIVALENT(S): at 07:39

## 2018-05-27 RX ADMIN — Medication 130 MILLIGRAM(S): at 23:17

## 2018-05-27 RX ADMIN — Medication 40 MILLIEQUIVALENT(S): at 14:19

## 2018-05-27 RX ADMIN — DEXMEDETOMIDINE HYDROCHLORIDE IN 0.9% SODIUM CHLORIDE 9.64 MICROGRAM(S)/KG/HR: 4 INJECTION INTRAVENOUS at 07:39

## 2018-05-27 RX ADMIN — SODIUM CHLORIDE 100 MILLILITER(S): 9 INJECTION, SOLUTION INTRAVENOUS at 08:01

## 2018-05-27 RX ADMIN — Medication 100 MILLIEQUIVALENT(S): at 05:44

## 2018-05-27 RX ADMIN — Medication 130 MILLIGRAM(S): at 07:42

## 2018-05-27 RX ADMIN — CHLORHEXIDINE GLUCONATE 1 APPLICATION(S): 213 SOLUTION TOPICAL at 07:42

## 2018-05-27 NOTE — PROGRESS NOTE ADULT - SUBJECTIVE AND OBJECTIVE BOX
Gastroenterology  Patient seen and examined bedside.  Sedated and restrained in ICU    T(F): 98.8 (05-27-18 @ 12:00), Max: 99.1 (05-26-18 @ 23:12)  HR: 69 (05-27-18 @ 12:00) (67 - 105)  BP: 128/80 (05-27-18 @ 12:00) (98/70 - 154/101)  RR: 17 (05-27-18 @ 12:00) (13 - 26)  SpO2: 99% (05-27-18 @ 12:00) (96% - 100%)  Wt(kg): --  CAPILLARY BLOOD GLUCOSE          PHYSICAL EXAM:  General: Sedated   Neuro:  unresponsive  HEENT: NCAT, EOMI, conjunctiva clear  CV: +S1+S2 regular rate and rhythm  Lung: clear to ausculation bilaterally, respirations nonlabored, good inspiratory effort  Abdomen: soft, Non tender, No Distension. Normal active BS  Extremities: no pedal edema or calf tenderness noted     LABS:               05-27    134<L>  |  102  |  7   ----------------------------<  74  2.9<LL>   |  21<L>  |  0.60    Ca    6.8<L>      27 May 2018 04:14  Phos  2.9     05-27  Mg     1.6     05-27          I&O's Detail    26 May 2018 07:01  -  27 May 2018 07:00  --------------------------------------------------------  IN:    dexmedetomidine Infusion: 360.3 mL    IV PiggyBack: 250 mL    multivitamin/thiamine/folic acid in sodium chloride 0.9%: 1100 mL    sodium chloride 0.45%.: 900 mL  Total IN: 2610.3 mL    OUT:    Voided: 500 mL  Total OUT: 500 mL    Total NET: 2110.3 mL      27 May 2018 07:01  -  27 May 2018 14:19  --------------------------------------------------------  IN:    dexmedetomidine Infusion: 13.5 mL    multivitamin/thiamine/folic acid in sodium chloride 0.9%: 100 mL  Total IN: 113.5 mL    OUT:  Total OUT: 0 mL    Total NET: 113.5 mL        05-27 @ 04:14    134 | 102 | 7  /6.8 | 1.6 | 2.9  _______________________/  2.9 | 21 | 0.60                           \par

## 2018-05-27 NOTE — PROGRESS NOTE ADULT - ASSESSMENT
Pt is a 52 yo M with h/o HLD, GERD and ETOH abuse with prior admission 2 to ETOH w/drawal. Pt presented 2 to c/o tremors, abdominal pain, nausea, and body aches. Pt states he stopped drinking 5/24 and he feels agitated, anxious and sweating like his typical withdrawal. Pt admitted to [facility] and RRT/ code gray called 2 to increased agitation. Admitting dx 1) ETOH abuse with w/drawal/DTs 2) Hypokalemia    Resp: Elevate HOB  CVS: Alvarado 2 to Precedex  FEN: Daily Thiamine, MVI and Folate/ NPO until MS improves/ Replace K   Endo: Follow Glu  Neuro/Psych: Cont Phenobarb and Precedex    CCT: 35 min

## 2018-05-27 NOTE — PROGRESS NOTE ADULT - SUBJECTIVE AND OBJECTIVE BOX
HPI:  Pt is a 52 yo M with h/o HLD, GERD and ETOH abuse with prior admission 2 to ETOH w/drawal. Pt presented 2 to c/o tremors, abdominal pain, nausea, and body aches. Pt states he stopped drinking  and he feels agitated, anxious and sweating like his typical withdrawal. Pt admitted to [facility] and RRT/ code gray called 2 to increased agitation. Admitting dx ETOH abuse with w/drawal/DTs      ## Labs:  CBC:                        see note  see note )-----------( see note    ( 27 May 2018 04:14 )             see note    Chem:      134<L>  |  102  |  7   ----------------------------<  74  2.9<LL>   |  21<L>  |  0.60    Ca    6.8<L>      27 May 2018 04:14  Phos  2.9       Mg     1.6         Coags:    ## Imaging:    ## Medications:    polyethylene glycol 3350 17 Gram(s) Oral daily PRN  sucralfate 1 Gram(s) Oral every 6 hours    dexmedetomidine Infusion 0.5 MICROgram(s)/kG/Hr IV Continuous <Continuous>  PHENobarbital Injectable 130 milliGRAM(s) IV Push every 30 minutes PRN  PHENobarbital Injectable 260 milliGRAM(s) IV Push every 30 minutes PRN  PHENobarbital Injectable 130 milliGRAM(s) IV Push every 6 hours      ## Vitals:  T(C): 37.1 (18 @ 12:00), Max: 37.3 (18 @ 23:12)  HR: 69 (18 @ 12:00) (68 - 105)  BP: 128/80 (18 @ 12:00) (98/70 - 154/101)  BP(mean): 91 (18 @ 12:00) (73 - 114)  RR: 17 (18 @ 12:00) (15 - 26)  SpO2: 99% (18 @ 12:00) (96% - 100%)  Wt(kg): --  Vent:   AB-26 @ 07:01  -   @ 07:00  --------------------------------------------------------  IN: 2610.3 mL / OUT: 500 mL / NET: 2110.3 mL     @ 07:01   @ 15:32  --------------------------------------------------------  IN: 113.5 mL / OUT: 0 mL / NET: 113.5 mL    ## P/E:  Gen: lying comfortably in bed in no apparent distress  Lungs: CTA  Heart: RRR  Abd: Soft/+BS  Ext: No edema  Neuro: Confuse    CENTRAL LINE: [ ] YES [x ] NO  LOCATION:   DATE INSERTED:  REMOVE: [ ] YES [ ] NO      CHAN: [ ] YES [x ] NO    DATE INSERTED:  REMOVE:  [ ] YES [ ] NO      A-LINE:  [ ] YES [x ] NO  LOCATION:   DATE INSERTED:  REMOVE:  [ ] YES [ ] NO  EXPLAIN:    CODE STATUS: [x] full code  [ ] DNR  [ ] DNI  [ ] [facility]  Goals of care discussion: [ ] yes

## 2018-05-28 LAB
AMYLASE P1 CFR SERPL: 49 U/L — SIGNIFICANT CHANGE UP (ref 25–115)
ANION GAP SERPL CALC-SCNC: 12 MMOL/L — SIGNIFICANT CHANGE UP (ref 5–17)
BUN SERPL-MCNC: 7 MG/DL — SIGNIFICANT CHANGE UP (ref 7–23)
CALCIUM SERPL-MCNC: 8.7 MG/DL — SIGNIFICANT CHANGE UP (ref 8.5–10.1)
CHLORIDE SERPL-SCNC: 107 MMOL/L — SIGNIFICANT CHANGE UP (ref 96–108)
CO2 SERPL-SCNC: 18 MMOL/L — LOW (ref 22–31)
CREAT SERPL-MCNC: 0.59 MG/DL — SIGNIFICANT CHANGE UP (ref 0.5–1.3)
GLUCOSE SERPL-MCNC: 76 MG/DL — SIGNIFICANT CHANGE UP (ref 70–99)
HCT VFR BLD CALC: 43 % — SIGNIFICANT CHANGE UP (ref 39–50)
HGB BLD-MCNC: 13.8 G/DL — SIGNIFICANT CHANGE UP (ref 13–17)
LIDOCAIN IGE QN: 82 U/L — SIGNIFICANT CHANGE UP (ref 73–393)
MAGNESIUM SERPL-MCNC: 2 MG/DL — SIGNIFICANT CHANGE UP (ref 1.6–2.6)
MCHC RBC-ENTMCNC: 28.3 PG — SIGNIFICANT CHANGE UP (ref 27–34)
MCHC RBC-ENTMCNC: 32.1 GM/DL — SIGNIFICANT CHANGE UP (ref 32–36)
MCV RBC AUTO: 88.3 FL — SIGNIFICANT CHANGE UP (ref 80–100)
NRBC # BLD: 0 /100 WBCS — SIGNIFICANT CHANGE UP (ref 0–0)
PHOSPHATE SERPL-MCNC: 2.9 MG/DL — SIGNIFICANT CHANGE UP (ref 2.5–4.5)
PLATELET # BLD AUTO: 174 K/UL — SIGNIFICANT CHANGE UP (ref 150–400)
POTASSIUM SERPL-MCNC: 3.9 MMOL/L — SIGNIFICANT CHANGE UP (ref 3.5–5.3)
POTASSIUM SERPL-SCNC: 3.9 MMOL/L — SIGNIFICANT CHANGE UP (ref 3.5–5.3)
RBC # BLD: 4.87 M/UL — SIGNIFICANT CHANGE UP (ref 4.2–5.8)
RBC # FLD: 12.8 % — SIGNIFICANT CHANGE UP (ref 10.3–14.5)
SODIUM SERPL-SCNC: 137 MMOL/L — SIGNIFICANT CHANGE UP (ref 135–145)
WBC # BLD: 11.68 K/UL — HIGH (ref 3.8–10.5)
WBC # FLD AUTO: 11.68 K/UL — HIGH (ref 3.8–10.5)

## 2018-05-28 PROCEDURE — 99291 CRITICAL CARE FIRST HOUR: CPT

## 2018-05-28 RX ORDER — FOLIC ACID 0.8 MG
1 TABLET ORAL DAILY
Qty: 0 | Refills: 0 | Status: DISCONTINUED | OUTPATIENT
Start: 2018-05-28 | End: 2018-06-11

## 2018-05-28 RX ORDER — MIDAZOLAM HYDROCHLORIDE 1 MG/ML
2 INJECTION, SOLUTION INTRAMUSCULAR; INTRAVENOUS ONCE
Qty: 0 | Refills: 0 | Status: DISCONTINUED | OUTPATIENT
Start: 2018-05-28 | End: 2018-05-28

## 2018-05-28 RX ORDER — MULTIVIT-MIN/FERROUS GLUCONATE 9 MG/15 ML
1 LIQUID (ML) ORAL DAILY
Qty: 0 | Refills: 0 | Status: DISCONTINUED | OUTPATIENT
Start: 2018-05-28 | End: 2018-06-11

## 2018-05-28 RX ORDER — THIAMINE MONONITRATE (VIT B1) 100 MG
100 TABLET ORAL DAILY
Qty: 0 | Refills: 0 | Status: DISCONTINUED | OUTPATIENT
Start: 2018-05-28 | End: 2018-06-11

## 2018-05-28 RX ORDER — PANTOPRAZOLE SODIUM 20 MG/1
40 TABLET, DELAYED RELEASE ORAL
Qty: 0 | Refills: 0 | Status: DISCONTINUED | OUTPATIENT
Start: 2018-05-28 | End: 2018-05-29

## 2018-05-28 RX ORDER — KETAMINE HYDROCHLORIDE 100 MG/ML
75 INJECTION INTRAMUSCULAR; INTRAVENOUS ONCE
Qty: 0 | Refills: 0 | Status: DISCONTINUED | OUTPATIENT
Start: 2018-05-28 | End: 2018-05-28

## 2018-05-28 RX ADMIN — DEXMEDETOMIDINE HYDROCHLORIDE IN 0.9% SODIUM CHLORIDE 9.64 MICROGRAM(S)/KG/HR: 4 INJECTION INTRAVENOUS at 16:28

## 2018-05-28 RX ADMIN — Medication 130 MILLIGRAM(S): at 23:27

## 2018-05-28 RX ADMIN — Medication 1 TABLET(S): at 12:48

## 2018-05-28 RX ADMIN — KETAMINE HYDROCHLORIDE 75 MILLIGRAM(S): 100 INJECTION INTRAMUSCULAR; INTRAVENOUS at 16:28

## 2018-05-28 RX ADMIN — MIDAZOLAM HYDROCHLORIDE 2 MILLIGRAM(S): 1 INJECTION, SOLUTION INTRAMUSCULAR; INTRAVENOUS at 15:43

## 2018-05-28 RX ADMIN — Medication 260 MILLIGRAM(S): at 15:53

## 2018-05-28 RX ADMIN — PANTOPRAZOLE SODIUM 40 MILLIGRAM(S): 20 TABLET, DELAYED RELEASE ORAL at 12:48

## 2018-05-28 RX ADMIN — Medication 1 GRAM(S): at 12:47

## 2018-05-28 RX ADMIN — Medication 1 MILLIGRAM(S): at 12:49

## 2018-05-28 RX ADMIN — Medication 100 MILLIGRAM(S): at 12:51

## 2018-05-28 RX ADMIN — Medication 1 GRAM(S): at 06:43

## 2018-05-28 RX ADMIN — Medication 130 MILLIGRAM(S): at 14:57

## 2018-05-28 RX ADMIN — Medication 130 MILLIGRAM(S): at 07:46

## 2018-05-28 NOTE — PROGRESS NOTE ADULT - SUBJECTIVE AND OBJECTIVE BOX
HPI:  53 year old man with PMH gerd, HLD, etoh abuse presents with complaint of tremors, abdominal pain, nausea, and body aches.  He states he stopped drinking yesterday and this feels typical of his withdrawals.  He feels agitated and anxious, has been sweating.    In the ED, work up consistent with gastritis with esophagitis, CIWA 10.  Lactate 6.1-->3.2. (25 May 2018 03:23)      24 hr events:      ## ROS:  [ ] unable to obtain  CONSTITUTIONAL: No fever, weight loss, or fatigue  EYES: No eye pain, visual disturbances, or discharge  ENMT:  No difficulty hearing, tinnitus, vertigo; No sinus or throat pain  NECK: No pain or stiffness  RESPIRATORY: No cough, wheezing, chills or hemoptysis; No shortness of breath  CARDIOVASCULAR: No chest pain, palpitations, dizziness, or leg swelling  GASTROINTESTINAL: No abdominal or epigastric pain. No nausea, vomiting, or hematemesis; No diarrhea or constipation. No melena or hematochezia.  GENITOURINARY: No dysuria, frequency, hematuria, or incontinence  NEUROLOGICAL: No headaches, memory loss, loss of strength, numbness, or tremors  SKIN: No itching, burning, rashes, or lesions   LYMPH NODES: No enlarged glands  ENDOCRINE: No heat or cold intolerance; No hair loss  MUSCULOSKELETAL: No joint pain or swelling; No muscle, back, or extremity pain  PSYCHIATRIC: No depression, anxiety, mood swings, or difficulty sleeping  HEME/LYMPH: No easy bruising, or bleeding gums  ALLERGY AND IMMUNOLOGIC: No hives or eczema    ## Labs:  CBC:                        13.8   11.68 )-----------( 174      ( 28 May 2018 04:00 )             43.0     Chem:      137  |  107  |  7   ----------------------------<  76  3.9   |  18<L>  |  0.59    Ca    8.7      28 May 2018 04:00  Phos  2.9       Mg     2.0           Coags:          ## Imaging:    ## Medications:            pantoprazole    Tablet 40 milliGRAM(s) Oral before breakfast  polyethylene glycol 3350 17 Gram(s) Oral daily PRN  sucralfate 1 Gram(s) Oral every 6 hours    dexmedetomidine Infusion 0.5 MICROgram(s)/kG/Hr IV Continuous <Continuous>  PHENobarbital Injectable 130 milliGRAM(s) IV Push every 30 minutes PRN  PHENobarbital Injectable 260 milliGRAM(s) IV Push every 30 minutes PRN  PHENobarbital Injectable 130 milliGRAM(s) IV Push every 6 hours      ## Vitals:  T(C): 36.9 (18 @ 13:15), Max: 37.1 (18 @ 09:30)  HR: 64 (18 @ 18:00) (62 - 108)  BP: 105/71 (18 @ 18:00) (90/61 - 150/96)  BP(mean): 80 (18 @ 18:00) (68 - 111)  RR: 18 (18 @ 18:00) (13 - 32)  SpO2: 100% (18 @ 18:00) (97% - 100%)  Wt(kg): --  Vent:   AB-27 @ 07:01  -   @ 07:00  --------------------------------------------------------  IN: 2325.3 mL / OUT: 0 mL / NET: 2325.3 mL     @ 07:01  -   @ 19:03  --------------------------------------------------------  IN: 1048.5 mL / OUT: 650 mL / NET: 398.5 mL          ## P/E:  Gen: lying comfortably in bed in no apparent distress  HEENT: PERRL, EOMI  Resp: CTA B/L no c/r/w  CVS: S1S2 no m/r/g  Abd: soft NT/ND +BS  Ext: no c/c/e  Neuro: A&Ox3    CENTRAL LINE: [ ] YES [ ] NO  LOCATION:   DATE INSERTED:  REMOVE: [ ] YES [ ] NO      CHAN: [ ] YES [ ] NO    DATE INSERTED:  REMOVE:  [ ] YES [ ] NO      A-LINE:  [ ] YES [ ] NO  LOCATION:   DATE INSERTED:  REMOVE:  [ ] YES [ ] NO  EXPLAIN:    GLOBAL ISSUE/BEST PRACTICE:  Analgesia:  Sedation:  HOB elevation: yes  Stress ulcer prophylaxis:  VTE prophylaxis:  Oral Care:  Glycemic control:  Nutrition:    CODE STATUS: [ ] full code  [ ] DNR  [ ] DNI  [ ] MOLST  Goals of care discussion: [ ] yes HPI:  53M PMH alcohol abuse, hx of alcohol withdrawal, gerd, HLD, presents with tremors, abdominal pain, nausea, and body aches.  Pt stopped drinking day prior to presentation and now feeling agitated and anxious, with sweats similar to prior ETOH withdrawal. Also with nausea, emesis, epigastric pain. Admitted to medical service for alcohol withdrawal, gastritis and esophagitis. Pt on medical floor with worsening withdrawal/DTs, agitated, combative on ativan protocol. Multiple code jessica called for severe agitation. CIWA as high as 40s requiring four point restraints. Transferred to ICU for severe delirium tremens requiring sedation protocol.       24 hr events:  pt at times hallucinating  confused  remains on precedex drip, standing phenobarbital 160mg q6 hours  required breakthrough phenobarbital overnight/early in AM  this afternoon with extreme agitation: kicking, flailing arms, climbing out of bed, combative s/p CODE gray: still agitated after phenobarbital 230mg IVP x2, standing phenobarbital 130mg IV, precedex gtt, versed 2mg IVP - restrained by multiple security and staff members, given ketamine 75mg IV with improvement in agitation    ## ROS:  [x] unable to obtain due to delirium     ## Labs:  CBC:                        13.8   11.68 )-----------( 174      ( 28 May 2018 04:00 )             43.0     Chem:  05-28    137  |  107  |  7   ----------------------------<  76  3.9   |  18<L>  |  0.59    Ca    8.7      28 May 2018 04:00  Phos  2.9     05-28  Mg     2.0     05-28        ## Medications:  pantoprazole    Tablet 40 milliGRAM(s) Oral before breakfast  polyethylene glycol 3350 17 Gram(s) Oral daily PRN  sucralfate 1 Gram(s) Oral every 6 hours    dexmedetomidine Infusion 0.5 MICROgram(s)/kG/Hr IV Continuous <Continuous>  PHENobarbital Injectable 130 milliGRAM(s) IV Push every 30 minutes PRN  PHENobarbital Injectable 260 milliGRAM(s) IV Push every 30 minutes PRN  PHENobarbital Injectable 130 milliGRAM(s) IV Push every 6 hours      ## Vitals:  T(C): 36.9 (05-28-18 @ 13:15), Max: 37.1 (05-28-18 @ 09:30)  HR: 64 (05-28-18 @ 18:00) (62 - 108)  BP: 105/71 (05-28-18 @ 18:00) (90/61 - 150/96)  BP(mean): 80 (05-28-18 @ 18:00) (68 - 111)  RR: 18 (05-28-18 @ 18:00) (13 - 32)  SpO2: 100% (05-28-18 @ 18:00) (97% - 100%)      05-27 @ 07:01  -  05-28 @ 07:00  --------------------------------------------------------  IN: 2325.3 mL / OUT: 0 mL / NET: 2325.3 mL    05-28 @ 07:01  -  05-28 @ 19:03  --------------------------------------------------------  IN: 1048.5 mL / OUT: 650 mL / NET: 398.5 mL          ## P/E:  Gen: currently sedated and calm, had extreme episode of agitation earlier in afternoon   HEENT: PERRL  Resp: CTA B/L  CVS: S1S2 RRR  Abd: soft NT/ND +BS  Ext: no c/c/e  Neuro: confused, disoriented, hallucinating (sees son while pointing to wall with no family member actually around)    CENTRAL LINE: [ ] YES [x] NO  LOCATION:   DATE INSERTED:  REMOVE: [ ] YES [ ] NO      CHAN: [ ] YES [x] NO    DATE INSERTED:  REMOVE:  [ ] YES [ ] NO      A-LINE:  [ ] YES [x] NO  LOCATION:   DATE INSERTED:  REMOVE:  [ ] YES [ ] NO  EXPLAIN:    GLOBAL ISSUE/BEST PRACTICE:  Analgesia: n/a  Sedation: precedex, phenobarbital  HOB elevation: yes  Stress ulcer prophylaxis: protonix, carafate  VTE prophylaxis: n/a  Oral Care: n/a  Glycemic control: n/a  Nutrition: clear liquid    CODE STATUS: [x] full code  [ ] DNR  [ ] DNI  [ ] MOLST  Goals of care discussion: [ ] yes

## 2018-05-28 NOTE — DIETITIAN INITIAL EVALUATION ADULT. - ENERGY NEEDS
Height (cm): 170.18 (05-24)  Weight (kg): 77.1 (05-24)  BMI (kg/m2): 26.6 (05-24)  IBW: 67.1 kg        % IBW:  114%          UBW: ?           %UBW: ?,   Current wt. 6.1 kg higher than drug dosing wt.(adm wt.), 1+ edema noted 05-27

## 2018-05-28 NOTE — PROGRESS NOTE ADULT - ASSESSMENT
53M PMH alcohol abuse, hx of alcohol withdrawal, gerd, HLD, presents with alcohol withdrawal, delirium tremens, alcoholic gastritis/esophagitis.     - cont precedex drip  - cont standing phenobarbital  - prn versed and phenobarbital for breakthrough agitation  - MVI/thiamine/folate for hx of alcohol abuse  - protonix and carafate for alcohol esophagitis and gastritis  - cont 1:1 observation  - IVF hydration  - monitor electrolytes and supplement as needed    Critical Care Time: 60 min

## 2018-05-28 NOTE — DIETITIAN INITIAL EVALUATION ADULT. - PERTINENT LABORATORY DATA
05-28  Hgb 13.8 g/dL Hct 43.0 % Na137 mmol/L Glu 76 mg/dL K+ 3.9 mmol/L Cr  0.59 mg/dL BUN 7 mg/dL 05-28 Phos 2.9 mg/dL 05-25 Alb 3.4 g/dL05-25 ALT 37 U/L AST 36 U/L Alkaline Phosphatase 48 U/L

## 2018-05-28 NOTE — DIETITIAN INITIAL EVALUATION ADULT. - NS AS NUTRI INTERV MEALS SNACK
Recommend advance diet as tolerated to full liquids to soft diet to regular diet (no beef or pork )/Texture-modified diet

## 2018-05-28 NOTE — DIETITIAN INITIAL EVALUATION ADULT. - SOURCE
Chart review, family was not present when pt seen, pt is on 1:1 observation, disoriented, was able to state date of birth and answer some questions/patient/other (specify)

## 2018-05-28 NOTE — DIETITIAN INITIAL EVALUATION ADULT. - PHYSICAL APPEARANCE
overweight/BMI=26.6(05-24) for weight of 77.1kg/170 LBS. BMI=26.6(05-24) for weight of 77.1kg/170 LBS. pt is not appropriate to conduct nutrition focus physical exam as pt going through alcohol withdrawal syndrome/well nourished/overweight

## 2018-05-28 NOTE — PROGRESS NOTE ADULT - SUBJECTIVE AND OBJECTIVE BOX
Gastroenterology  Patient seen and examined bedside.  Sedated and restrained in ICU  T(F): 98.7 (05-28-18 @ 09:30), Max: 98.8 (05-27-18 @ 12:00)  HR: 76 (05-28-18 @ 11:00) (62 - 95)  BP: 121/72 (05-28-18 @ 11:00) (109/81 - 150/96)  RR: 19 (05-28-18 @ 11:00) (15 - 32)  SpO2: 100% (05-28-18 @ 11:00) (97% - 100%)  Wt(kg): --  CAPILLARY BLOOD GLUCOSE          PHYSICAL EXAM:  General: NAD, WDWN.   Neuro: unresponsive   HEENT: NCAT, EOMI, conjunctiva clear  CV: +S1+S2 regular rate and rhythm  Lung: clear to ausculation bilaterally, respirations nonlabored, good inspiratory effort  Abdomen: soft, NonTender, No distention Normal active BS  Extremities: no cyanosis, clubbing or edema    LABS:                        13.8   11.68 )-----------( 174      ( 28 May 2018 04:00 )             43.0     05-28    137  |  107  |  7   ----------------------------<  76  3.9   |  18<L>  |  0.59    Ca    8.7      28 May 2018 04:00  Phos  2.9     05-28  Mg     2.0     05-28          I&O's Detail    27 May 2018 07:01  -  28 May 2018 07:00  --------------------------------------------------------  IN:    dexmedetomidine Infusion: 125.3 mL    multivitamin/thiamine/folic acid in sodium chloride 0.9%: 800 mL    sodium chloride 0.45%.: 1400 mL  Total IN: 2325.3 mL    OUT:  Total OUT: 0 mL    Total NET: 2325.3 mL      28 May 2018 07:01  -  28 May 2018 11:21  --------------------------------------------------------  IN:    dexmedetomidine Infusion: 21.2 mL    sodium chloride 0.45%.: 300 mL  Total IN: 321.2 mL    OUT:    Voided: 650 mL  Total OUT: 650 mL    Total NET: -328.8 mL        05-28 @ 04:00    137 | 107 | 7  /8.7 | 2.0 | 2.9  _______________________/  3.9 | 18 | 0.59                           \par   Amylase, Serum Total: 49 U/L (05-28 @ 04:00)

## 2018-05-28 NOTE — DIETITIAN INITIAL EVALUATION ADULT. - PERTINENT MEDS FT
phenobarbital, pantoprazole , sucralfate , thiamine , folic acid , multivitamin c minerals polyethylene glycol

## 2018-05-29 LAB
ANION GAP SERPL CALC-SCNC: 10 MMOL/L — SIGNIFICANT CHANGE UP (ref 5–17)
BUN SERPL-MCNC: 7 MG/DL — SIGNIFICANT CHANGE UP (ref 7–23)
CALCIUM SERPL-MCNC: 8.5 MG/DL — SIGNIFICANT CHANGE UP (ref 8.5–10.1)
CHLORIDE SERPL-SCNC: 108 MMOL/L — SIGNIFICANT CHANGE UP (ref 96–108)
CO2 SERPL-SCNC: 23 MMOL/L — SIGNIFICANT CHANGE UP (ref 22–31)
CREAT SERPL-MCNC: 0.63 MG/DL — SIGNIFICANT CHANGE UP (ref 0.5–1.3)
GLUCOSE SERPL-MCNC: 75 MG/DL — SIGNIFICANT CHANGE UP (ref 70–99)
HCT VFR BLD CALC: 42.8 % — SIGNIFICANT CHANGE UP (ref 39–50)
HGB BLD-MCNC: 13.7 G/DL — SIGNIFICANT CHANGE UP (ref 13–17)
MAGNESIUM SERPL-MCNC: 2 MG/DL — SIGNIFICANT CHANGE UP (ref 1.6–2.6)
MCHC RBC-ENTMCNC: 28.2 PG — SIGNIFICANT CHANGE UP (ref 27–34)
MCHC RBC-ENTMCNC: 32 GM/DL — SIGNIFICANT CHANGE UP (ref 32–36)
MCV RBC AUTO: 88.1 FL — SIGNIFICANT CHANGE UP (ref 80–100)
NRBC # BLD: 0 /100 WBCS — SIGNIFICANT CHANGE UP (ref 0–0)
PHOSPHATE SERPL-MCNC: 2.6 MG/DL — SIGNIFICANT CHANGE UP (ref 2.5–4.5)
PLATELET # BLD AUTO: 148 K/UL — LOW (ref 150–400)
POTASSIUM SERPL-MCNC: 3.7 MMOL/L — SIGNIFICANT CHANGE UP (ref 3.5–5.3)
POTASSIUM SERPL-SCNC: 3.7 MMOL/L — SIGNIFICANT CHANGE UP (ref 3.5–5.3)
RBC # BLD: 4.86 M/UL — SIGNIFICANT CHANGE UP (ref 4.2–5.8)
RBC # FLD: 12.9 % — SIGNIFICANT CHANGE UP (ref 10.3–14.5)
SODIUM SERPL-SCNC: 141 MMOL/L — SIGNIFICANT CHANGE UP (ref 135–145)
WBC # BLD: 7.51 K/UL — SIGNIFICANT CHANGE UP (ref 3.8–10.5)
WBC # FLD AUTO: 7.51 K/UL — SIGNIFICANT CHANGE UP (ref 3.8–10.5)

## 2018-05-29 PROCEDURE — 99291 CRITICAL CARE FIRST HOUR: CPT

## 2018-05-29 RX ORDER — PANTOPRAZOLE SODIUM 20 MG/1
40 TABLET, DELAYED RELEASE ORAL DAILY
Qty: 0 | Refills: 0 | Status: DISCONTINUED | OUTPATIENT
Start: 2018-05-29 | End: 2018-06-01

## 2018-05-29 RX ADMIN — Medication 260 MILLIGRAM(S): at 00:07

## 2018-05-29 RX ADMIN — Medication 260 MILLIGRAM(S): at 21:29

## 2018-05-29 RX ADMIN — Medication 130 MILLIGRAM(S): at 04:31

## 2018-05-29 RX ADMIN — Medication 130 MILLIGRAM(S): at 20:03

## 2018-05-29 RX ADMIN — SODIUM CHLORIDE 100 MILLILITER(S): 9 INJECTION, SOLUTION INTRAVENOUS at 11:54

## 2018-05-29 RX ADMIN — Medication 130 MILLIGRAM(S): at 07:42

## 2018-05-29 RX ADMIN — Medication 130 MILLIGRAM(S): at 09:11

## 2018-05-29 RX ADMIN — Medication 260 MILLIGRAM(S): at 08:34

## 2018-05-29 RX ADMIN — PANTOPRAZOLE SODIUM 40 MILLIGRAM(S): 20 TABLET, DELAYED RELEASE ORAL at 14:42

## 2018-05-29 RX ADMIN — Medication 130 MILLIGRAM(S): at 11:54

## 2018-05-29 RX ADMIN — Medication 130 MILLIGRAM(S): at 14:42

## 2018-05-29 RX ADMIN — Medication 130 MILLIGRAM(S): at 03:38

## 2018-05-29 RX ADMIN — DEXMEDETOMIDINE HYDROCHLORIDE IN 0.9% SODIUM CHLORIDE 9.64 MICROGRAM(S)/KG/HR: 4 INJECTION INTRAVENOUS at 11:54

## 2018-05-29 RX ADMIN — Medication 260 MILLIGRAM(S): at 05:23

## 2018-05-29 NOTE — PROGRESS NOTE ADULT - SUBJECTIVE AND OBJECTIVE BOX
Gastroenterology  Patient seen and examined bedside.  Sedated and restrained in ICU    T(F): 98 (05-29-18 @ 11:07), Max: 99.9 (05-29-18 @ 07:16)  HR: 71 (05-29-18 @ 11:00) (62 - 108)  BP: 84/51 (05-29-18 @ 11:00) (81/43 - 138/96)  RR: 28 (05-29-18 @ 11:00) (10 - 31)  SpO2: 100% (05-29-18 @ 11:00) (79% - 100%)  Wt(kg): --  CAPILLARY BLOOD GLUCOSE        PHYSICAL EXAM:  General: NAD, WDWN.   Neuro: unresponsive   HEENT: NCAT, EOMI, conjunctiva clear  CV: +S1+S2 regular rate and rhythm  Lung: clear to ausculation bilaterally, respirations nonlabored, good inspiratory effort  Abdomen: soft, NonTender, No distention Normal active BS  Extremities: no cyanosis, clubbing or edema    LABS:                        13.7   7.51  )-----------( 148      ( 29 May 2018 04:20 )             42.8     05-29    141  |  108  |  7   ----------------------------<  75  3.7   |  23  |  0.63    Ca    8.5      29 May 2018 04:20  Phos  2.6     05-29  Mg     2.0     05-29          I&O's Detail    28 May 2018 07:01  -  29 May 2018 07:00  --------------------------------------------------------  IN:    dexmedetomidine Infusion: 175.2 mL    Oral Fluid: 460 mL    sodium chloride 0.45%.: 1700 mL  Total IN: 2335.2 mL    OUT:    Voided: 650 mL  Total OUT: 650 mL    Total NET: 1685.2 mL      29 May 2018 07:01  -  29 May 2018 12:57  --------------------------------------------------------  IN:    dexmedetomidine Infusion: 34 mL    sodium chloride 0.45%.: 200 mL  Total IN: 234 mL    OUT:  Total OUT: 0 mL    Total NET: 234 mL        05-29 @ 04:20    141 | 108 | 7  /8.5 | 2.0 | 2.6  _______________________/  3.7 | 23 | 0.63                           \par   Amylase, Serum Total: 49 U/L (05-28 @ 04:00)

## 2018-05-29 NOTE — PROGRESS NOTE ADULT - ASSESSMENT
53M PMH alcohol abuse, hx of alcohol withdrawal, gerd, HLD, presents with alcohol withdrawal, delirium tremens, alcoholic gastritis/esophagitis.     - cont precedex drip  - cont standing phenobarbital  - prn versed and phenobarbital for breakthrough agitation  - MVI/thiamine/folate for hx of alcohol abuse  - protonix and carafate for alcohol esophagitis and gastritis  - cont 1:1 observation  - IVF hydration  - monitor electrolytes and supplement as needed    Critical Care Time: 35 min

## 2018-05-29 NOTE — PROGRESS NOTE ADULT - SUBJECTIVE AND OBJECTIVE BOX
HPI:  53 year old man with PMH gerd, HLD, etoh abuse presents with complaint of tremors, abdominal pain, nausea, and body aches.  He states he stopped drinking yesterday and this feels typical of his withdrawals.  He feels agitated and anxious, has been sweating.    In the ED, work up consistent with gastritis with esophagitis, CIWA 10.  Lactate 6.1-->3.2. (25 May 2018 03:23)      24 hr events:      ## ROS:  [ ] unable to obtain  CONSTITUTIONAL: No fever, weight loss, or fatigue  EYES: No eye pain, visual disturbances, or discharge  ENMT:  No difficulty hearing, tinnitus, vertigo; No sinus or throat pain  NECK: No pain or stiffness  RESPIRATORY: No cough, wheezing, chills or hemoptysis; No shortness of breath  CARDIOVASCULAR: No chest pain, palpitations, dizziness, or leg swelling  GASTROINTESTINAL: No abdominal or epigastric pain. No nausea, vomiting, or hematemesis; No diarrhea or constipation. No melena or hematochezia.  GENITOURINARY: No dysuria, frequency, hematuria, or incontinence  NEUROLOGICAL: No headaches, memory loss, loss of strength, numbness, or tremors  SKIN: No itching, burning, rashes, or lesions   LYMPH NODES: No enlarged glands  ENDOCRINE: No heat or cold intolerance; No hair loss  MUSCULOSKELETAL: No joint pain or swelling; No muscle, back, or extremity pain  PSYCHIATRIC: No depression, anxiety, mood swings, or difficulty sleeping  HEME/LYMPH: No easy bruising, or bleeding gums  ALLERGY AND IMMUNOLOGIC: No hives or eczema    ## Labs:  CBC:                        13.7   7.51  )-----------( 148      ( 29 May 2018 04:20 )             42.8     Chem:      141  |  108  |  7   ----------------------------<  75  3.7   |  23  |  0.63    Ca    8.5      29 May 2018 04:20  Phos  2.6       Mg     2.0           Coags:          ## Imaging:    ## Medications:            pantoprazole  Injectable 40 milliGRAM(s) IV Push daily  polyethylene glycol 3350 17 Gram(s) Oral daily PRN  sucralfate 1 Gram(s) Oral every 6 hours    dexmedetomidine Infusion 0.5 MICROgram(s)/kG/Hr IV Continuous <Continuous>  PHENobarbital Injectable 130 milliGRAM(s) IV Push every 30 minutes PRN  PHENobarbital Injectable 260 milliGRAM(s) IV Push every 30 minutes PRN  PHENobarbital Injectable 130 milliGRAM(s) IV Push every 6 hours      ## Vitals:  T(C): 36 (18 @ 15:08), Max: 37.7 (18 @ 07:16)  HR: 73 (18 @ 18:00) (62 - 102)  BP: 109/63 (18 @ 18:00) (81/43 - 134/83)  BP(mean): 75 (18 @ 18:00) (50 - 94)  RR: 20 (18 @ 18:00) (10 - 31)  SpO2: 99% (18 @ 18:00) (79% - 100%)  Wt(kg): --  Vent:   AB-28 @ 07:01  -   @ 07:00  --------------------------------------------------------  IN: 2335.2 mL / OUT: 650 mL / NET: 1685.2 mL     @ 07:01  -   @ 18:58  --------------------------------------------------------  IN: 1317 mL / OUT: 0 mL / NET: 1317 mL          ## P/E:  Gen: lying comfortably in bed in no apparent distress  HEENT: PERRL, EOMI  Resp: CTA B/L no c/r/w  CVS: S1S2 no m/r/g  Abd: soft NT/ND +BS  Ext: no c/c/e  Neuro: A&Ox3    CENTRAL LINE: [ ] YES [ ] NO  LOCATION:   DATE INSERTED:  REMOVE: [ ] YES [ ] NO      CHAN: [ ] YES [ ] NO    DATE INSERTED:  REMOVE:  [ ] YES [ ] NO      A-LINE:  [ ] YES [ ] NO  LOCATION:   DATE INSERTED:  REMOVE:  [ ] YES [ ] NO  EXPLAIN:    GLOBAL ISSUE/BEST PRACTICE:  Analgesia:  Sedation:  HOB elevation: yes  Stress ulcer prophylaxis:  VTE prophylaxis:  Oral Care:  Glycemic control:  Nutrition:    CODE STATUS: [ ] full code  [ ] DNR  [ ] DNI  [ ] MOLST  Goals of care discussion: [ ] yes HPI:  53M PMH alcohol abuse, hx of alcohol withdrawal, gerd, HLD, presents with tremors, abdominal pain, nausea, and body aches.  Pt stopped drinking day prior to presentation and now feeling agitated and anxious, with sweats similar to prior ETOH withdrawal. Also with nausea, emesis, epigastric pain. Admitted to medical service for alcohol withdrawal, gastritis and esophagitis. Pt on medical floor with worsening withdrawal/DTs, agitated, combative on ativan protocol. Multiple code jessica called for severe agitation. CIWA as high as 40s requiring four point restraints. Transferred to ICU for severe delirium tremens requiring sedation protocol.         24 hr events:  severe agitation and combativeness yesterday  remains on sedation protocol, still requiring breakthrough phenobarbital but less combative today    ## ROS:  [x] unable to obtain due to DTs and sedation    ## Labs:  CBC:                        13.7   7.51  )-----------( 148      ( 29 May 2018 04:20 )             42.8     Chem:  05-29    141  |  108  |  7   ----------------------------<  75  3.7   |  23  |  0.63    Ca    8.5      29 May 2018 04:20  Phos  2.6     05-29  Mg     2.0     05-29      ## Medications:  pantoprazole  Injectable 40 milliGRAM(s) IV Push daily  polyethylene glycol 3350 17 Gram(s) Oral daily PRN  sucralfate 1 Gram(s) Oral every 6 hours    dexmedetomidine Infusion 0.5 MICROgram(s)/kG/Hr IV Continuous <Continuous>  PHENobarbital Injectable 130 milliGRAM(s) IV Push every 30 minutes PRN  PHENobarbital Injectable 260 milliGRAM(s) IV Push every 30 minutes PRN  PHENobarbital Injectable 130 milliGRAM(s) IV Push every 6 hours      ## Vitals:  T(C): 36 (05-29-18 @ 15:08), Max: 37.7 (05-29-18 @ 07:16)  HR: 73 (05-29-18 @ 18:00) (62 - 102)  BP: 109/63 (05-29-18 @ 18:00) (81/43 - 134/83)  BP(mean): 75 (05-29-18 @ 18:00) (50 - 94)  RR: 20 (05-29-18 @ 18:00) (10 - 31)  SpO2: 99% (05-29-18 @ 18:00) (79% - 100%)        05-28 @ 07:01  -  05-29 @ 07:00  --------------------------------------------------------  IN: 2335.2 mL / OUT: 650 mL / NET: 1685.2 mL    05-29 @ 07:01  -  05-29 @ 18:58  --------------------------------------------------------  IN: 1317 mL / OUT: 0 mL / NET: 1317 mL          ## P/E:  Gen: sedated, lying comfortably in bed  HEENT: PERRL  Resp: CTA B/L, no wheeze, no rales  CVS: S1S2 RRR  Abd: soft ND +BS  Ext: no c/c/e  Neuro: sedated, moves all 4 extremities spontaneously     CENTRAL LINE: [ ] YES [x] NO  LOCATION:   DATE INSERTED:  REMOVE: [ ] YES [ ] NO      CHAN: [ ] YES [x] NO    DATE INSERTED:  REMOVE:  [ ] YES [ ] NO      A-LINE:  [ ] YES [x] NO  LOCATION:   DATE INSERTED:  REMOVE:  [ ] YES [ ] NO  EXPLAIN:    GLOBAL ISSUE/BEST PRACTICE:  Analgesia: n/a  Sedation: precedex, phenobarbital  HOB elevation: yes  Stress ulcer prophylaxis: protonix  VTE prophylaxis: bilateral compression devices  Oral Care: n/a  Glycemic control: n/a  Nutrition: clear liquid    CODE STATUS: [x] full code  [ ] DNR  [ ] DNI  [ ] [facility]  Goals of care discussion: [ ] yes

## 2018-05-30 LAB
ALBUMIN SERPL ELPH-MCNC: 2.4 G/DL — LOW (ref 3.3–5)
ALP SERPL-CCNC: 41 U/L — SIGNIFICANT CHANGE UP (ref 40–120)
ALT FLD-CCNC: 20 U/L — SIGNIFICANT CHANGE UP (ref 12–78)
ANION GAP SERPL CALC-SCNC: 12 MMOL/L — SIGNIFICANT CHANGE UP (ref 5–17)
APPEARANCE UR: CLEAR — SIGNIFICANT CHANGE UP
AST SERPL-CCNC: 15 U/L — SIGNIFICANT CHANGE UP (ref 15–37)
BILIRUB DIRECT SERPL-MCNC: 0.16 MG/DL — SIGNIFICANT CHANGE UP (ref 0.05–0.2)
BILIRUB INDIRECT FLD-MCNC: 0.3 MG/DL — SIGNIFICANT CHANGE UP (ref 0.2–1)
BILIRUB SERPL-MCNC: 0.5 MG/DL — SIGNIFICANT CHANGE UP (ref 0.2–1.2)
BILIRUB UR-MCNC: NEGATIVE — SIGNIFICANT CHANGE UP
BUN SERPL-MCNC: 5 MG/DL — LOW (ref 7–23)
CALCIUM SERPL-MCNC: 8.9 MG/DL — SIGNIFICANT CHANGE UP (ref 8.5–10.1)
CHLORIDE SERPL-SCNC: 107 MMOL/L — SIGNIFICANT CHANGE UP (ref 96–108)
CO2 SERPL-SCNC: 21 MMOL/L — LOW (ref 22–31)
COLOR SPEC: YELLOW — SIGNIFICANT CHANGE UP
COMMENT - URINE: SIGNIFICANT CHANGE UP
CREAT SERPL-MCNC: 0.83 MG/DL — SIGNIFICANT CHANGE UP (ref 0.5–1.3)
DIFF PNL FLD: ABNORMAL
EPI CELLS # UR: SIGNIFICANT CHANGE UP
GLUCOSE SERPL-MCNC: 69 MG/DL — LOW (ref 70–99)
GLUCOSE UR QL: NEGATIVE MG/DL — SIGNIFICANT CHANGE UP
GRAM STN FLD: SIGNIFICANT CHANGE UP
HCT VFR BLD CALC: 43.2 % — SIGNIFICANT CHANGE UP (ref 39–50)
HGB BLD-MCNC: 13.9 G/DL — SIGNIFICANT CHANGE UP (ref 13–17)
KETONES UR-MCNC: ABNORMAL
LEUKOCYTE ESTERASE UR-ACNC: NEGATIVE — SIGNIFICANT CHANGE UP
LIDOCAIN IGE QN: 67 U/L — LOW (ref 73–393)
MAGNESIUM SERPL-MCNC: 2 MG/DL — SIGNIFICANT CHANGE UP (ref 1.6–2.6)
MCHC RBC-ENTMCNC: 28.4 PG — SIGNIFICANT CHANGE UP (ref 27–34)
MCHC RBC-ENTMCNC: 32.2 GM/DL — SIGNIFICANT CHANGE UP (ref 32–36)
MCV RBC AUTO: 88.2 FL — SIGNIFICANT CHANGE UP (ref 80–100)
NITRITE UR-MCNC: NEGATIVE — SIGNIFICANT CHANGE UP
NRBC # BLD: 0 /100 WBCS — SIGNIFICANT CHANGE UP (ref 0–0)
PH UR: 6 — SIGNIFICANT CHANGE UP (ref 5–8)
PHENOBARB SERPL-MCNC: 87.5 UG/ML — CRITICAL HIGH (ref 15–40)
PHOSPHATE SERPL-MCNC: 3.2 MG/DL — SIGNIFICANT CHANGE UP (ref 2.5–4.5)
PLATELET # BLD AUTO: 181 K/UL — SIGNIFICANT CHANGE UP (ref 150–400)
POTASSIUM SERPL-MCNC: 3.9 MMOL/L — SIGNIFICANT CHANGE UP (ref 3.5–5.3)
POTASSIUM SERPL-SCNC: 3.9 MMOL/L — SIGNIFICANT CHANGE UP (ref 3.5–5.3)
PROT SERPL-MCNC: 6.6 GM/DL — SIGNIFICANT CHANGE UP (ref 6–8.3)
PROT UR-MCNC: NEGATIVE MG/DL — SIGNIFICANT CHANGE UP
RBC # BLD: 4.9 M/UL — SIGNIFICANT CHANGE UP (ref 4.2–5.8)
RBC # FLD: 12.9 % — SIGNIFICANT CHANGE UP (ref 10.3–14.5)
RBC CASTS # UR COMP ASSIST: SIGNIFICANT CHANGE UP /HPF (ref 0–4)
SODIUM SERPL-SCNC: 140 MMOL/L — SIGNIFICANT CHANGE UP (ref 135–145)
SP GR SPEC: 1.01 — SIGNIFICANT CHANGE UP (ref 1.01–1.02)
SPECIMEN SOURCE: SIGNIFICANT CHANGE UP
UROBILINOGEN FLD QL: NEGATIVE MG/DL — SIGNIFICANT CHANGE UP
WBC # BLD: 5.88 K/UL — SIGNIFICANT CHANGE UP (ref 3.8–10.5)
WBC # FLD AUTO: 5.88 K/UL — SIGNIFICANT CHANGE UP (ref 3.8–10.5)
WBC UR QL: SIGNIFICANT CHANGE UP

## 2018-05-30 PROCEDURE — 71045 X-RAY EXAM CHEST 1 VIEW: CPT | Mod: 26

## 2018-05-30 PROCEDURE — 71045 X-RAY EXAM CHEST 1 VIEW: CPT | Mod: 26,77

## 2018-05-30 PROCEDURE — 99291 CRITICAL CARE FIRST HOUR: CPT

## 2018-05-30 RX ORDER — AMPICILLIN SODIUM AND SULBACTAM SODIUM 250; 125 MG/ML; MG/ML
3 INJECTION, POWDER, FOR SUSPENSION INTRAMUSCULAR; INTRAVENOUS EVERY 6 HOURS
Qty: 0 | Refills: 0 | Status: DISCONTINUED | OUTPATIENT
Start: 2018-05-30 | End: 2018-06-06

## 2018-05-30 RX ORDER — ACETAMINOPHEN 500 MG
650 TABLET ORAL EVERY 6 HOURS
Qty: 0 | Refills: 0 | Status: DISCONTINUED | OUTPATIENT
Start: 2018-05-30 | End: 2018-06-11

## 2018-05-30 RX ORDER — SODIUM CHLORIDE 9 MG/ML
1000 INJECTION, SOLUTION INTRAVENOUS ONCE
Qty: 0 | Refills: 0 | Status: COMPLETED | OUTPATIENT
Start: 2018-05-30 | End: 2018-05-30

## 2018-05-30 RX ORDER — NOREPINEPHRINE BITARTRATE/D5W 8 MG/250ML
0.05 PLASTIC BAG, INJECTION (ML) INTRAVENOUS
Qty: 8 | Refills: 0 | Status: DISCONTINUED | OUTPATIENT
Start: 2018-05-30 | End: 2018-06-01

## 2018-05-30 RX ORDER — MIDODRINE HYDROCHLORIDE 2.5 MG/1
10 TABLET ORAL EVERY 8 HOURS
Qty: 0 | Refills: 0 | Status: DISCONTINUED | OUTPATIENT
Start: 2018-05-30 | End: 2018-06-01

## 2018-05-30 RX ORDER — ENOXAPARIN SODIUM 100 MG/ML
40 INJECTION SUBCUTANEOUS DAILY
Qty: 0 | Refills: 0 | Status: DISCONTINUED | OUTPATIENT
Start: 2018-05-30 | End: 2018-06-11

## 2018-05-30 RX ADMIN — CHLORHEXIDINE GLUCONATE 1 APPLICATION(S): 213 SOLUTION TOPICAL at 06:00

## 2018-05-30 RX ADMIN — MIDODRINE HYDROCHLORIDE 10 MILLIGRAM(S): 2.5 TABLET ORAL at 18:30

## 2018-05-30 RX ADMIN — DEXMEDETOMIDINE HYDROCHLORIDE IN 0.9% SODIUM CHLORIDE 9.64 MICROGRAM(S)/KG/HR: 4 INJECTION INTRAVENOUS at 18:29

## 2018-05-30 RX ADMIN — Medication 1 GRAM(S): at 14:16

## 2018-05-30 RX ADMIN — Medication 2 MILLIGRAM(S): at 03:43

## 2018-05-30 RX ADMIN — Medication 100 MILLIGRAM(S): at 14:16

## 2018-05-30 RX ADMIN — Medication 1 TABLET(S): at 14:16

## 2018-05-30 RX ADMIN — DEXMEDETOMIDINE HYDROCHLORIDE IN 0.9% SODIUM CHLORIDE 9.64 MICROGRAM(S)/KG/HR: 4 INJECTION INTRAVENOUS at 03:44

## 2018-05-30 RX ADMIN — Medication 130 MILLIGRAM(S): at 01:47

## 2018-05-30 RX ADMIN — MIDODRINE HYDROCHLORIDE 10 MILLIGRAM(S): 2.5 TABLET ORAL at 22:40

## 2018-05-30 RX ADMIN — Medication 7.5 MICROGRAM(S)/KG/MIN: at 18:44

## 2018-05-30 RX ADMIN — Medication 1 MILLIGRAM(S): at 14:16

## 2018-05-30 RX ADMIN — SODIUM CHLORIDE 4000 MILLILITER(S): 9 INJECTION, SOLUTION INTRAVENOUS at 09:10

## 2018-05-30 RX ADMIN — SODIUM CHLORIDE 4000 MILLILITER(S): 9 INJECTION, SOLUTION INTRAVENOUS at 10:00

## 2018-05-30 RX ADMIN — Medication 1 GRAM(S): at 18:30

## 2018-05-30 RX ADMIN — ENOXAPARIN SODIUM 40 MILLIGRAM(S): 100 INJECTION SUBCUTANEOUS at 18:29

## 2018-05-30 RX ADMIN — DEXMEDETOMIDINE HYDROCHLORIDE IN 0.9% SODIUM CHLORIDE 9.64 MICROGRAM(S)/KG/HR: 4 INJECTION INTRAVENOUS at 14:18

## 2018-05-30 RX ADMIN — PANTOPRAZOLE SODIUM 40 MILLIGRAM(S): 20 TABLET, DELAYED RELEASE ORAL at 14:15

## 2018-05-30 RX ADMIN — SODIUM CHLORIDE 4000 MILLILITER(S): 9 INJECTION, SOLUTION INTRAVENOUS at 08:00

## 2018-05-30 RX ADMIN — AMPICILLIN SODIUM AND SULBACTAM SODIUM 200 GRAM(S): 250; 125 INJECTION, POWDER, FOR SUSPENSION INTRAMUSCULAR; INTRAVENOUS at 18:29

## 2018-05-30 RX ADMIN — Medication 130 MILLIGRAM(S): at 20:30

## 2018-05-30 NOTE — PROGRESS NOTE ADULT - SUBJECTIVE AND OBJECTIVE BOX
HPI:  53 year old man with PMH gerd, HLD, etoh abuse presents with complaint of tremors, abdominal pain, nausea, and body aches.  He states he stopped drinking yesterday and this feels typical of his withdrawals.  He feels agitated and anxious, has been sweating.    In the ED, work up consistent with gastritis with esophagitis, CIWA 10.  Lactate 6.1-->3.2. (25 May 2018 03:23)      24 hr events:      ## ROS:  [ ] unable to obtain  CONSTITUTIONAL: No fever, weight loss, or fatigue  EYES: No eye pain, visual disturbances, or discharge  ENMT:  No difficulty hearing, tinnitus, vertigo; No sinus or throat pain  NECK: No pain or stiffness  RESPIRATORY: No cough, wheezing, chills or hemoptysis; No shortness of breath  CARDIOVASCULAR: No chest pain, palpitations, dizziness, or leg swelling  GASTROINTESTINAL: No abdominal or epigastric pain. No nausea, vomiting, or hematemesis; No diarrhea or constipation. No melena or hematochezia.  GENITOURINARY: No dysuria, frequency, hematuria, or incontinence  NEUROLOGICAL: No headaches, memory loss, loss of strength, numbness, or tremors  SKIN: No itching, burning, rashes, or lesions   LYMPH NODES: No enlarged glands  ENDOCRINE: No heat or cold intolerance; No hair loss  MUSCULOSKELETAL: No joint pain or swelling; No muscle, back, or extremity pain  PSYCHIATRIC: No depression, anxiety, mood swings, or difficulty sleeping  HEME/LYMPH: No easy bruising, or bleeding gums  ALLERGY AND IMMUNOLOGIC: No hives or eczema    ## Labs:  CBC:                        13.9   5.88  )-----------( 181      ( 30 May 2018 04:04 )             43.2     Chem:      140  |  107  |  5<L>  ----------------------------<  69<L>  3.9   |  21<L>  |  0.83    Ca    8.9      30 May 2018 04:04  Phos  3.2     05-  Mg     2.0         TPro  6.6  /  Alb  2.4<L>  /  TBili  0.5  /  DBili  .16  /  AST  15  /  ALT  20  /  AlkPhos  41      Coags:          ## Imaging:    ## Medications:  ampicillin/sulbactam  IVPB 3 Gram(s) IV Intermittent every 6 hours    midodrine 10 milliGRAM(s) Oral every 8 hours  norepinephrine Infusion 0.05 MICROgram(s)/kG/Min IV Continuous <Continuous>        enoxaparin Injectable 40 milliGRAM(s) SubCutaneous daily    pantoprazole  Injectable 40 milliGRAM(s) IV Push daily  polyethylene glycol 3350 17 Gram(s) Oral daily PRN  sucralfate 1 Gram(s) Oral every 6 hours    acetaminophen  Suppository 650 milliGRAM(s) Rectal every 6 hours PRN  dexmedetomidine Infusion 0.5 MICROgram(s)/kG/Hr IV Continuous <Continuous>  PHENobarbital Injectable 130 milliGRAM(s) IV Push every 30 minutes PRN  PHENobarbital Injectable 260 milliGRAM(s) IV Push every 30 minutes PRN  PHENobarbital Injectable 130 milliGRAM(s) IV Push every 6 hours      ## Vitals:  T(C): 37 (18 @ 16:15), Max: 39.4 (18 @ 07:30)  HR: 86 (18 @ 21:00) (66 - 111)  BP: 146/83 (18 @ 21:00) (68/39 - 157/97)  BP(mean): 99 (18 @ 21:00) (45 - 112)  RR: 18 (18 @ 21:00) (14 - 31)  SpO2: 99% (18 @ 21:00) (89% - 100%)  Wt(kg): --  Vent:   AB-29 @ 07:01  -   @ 07:00  --------------------------------------------------------  IN: 1621 mL / OUT: 0 mL / NET: 1621 mL     @ 07:01  -   @ 22:08  --------------------------------------------------------  IN: 1478 mL / OUT: 250 mL / NET: 1228 mL          ## P/E:  Gen: lying comfortably in bed in no apparent distress  HEENT: PERRL, EOMI  Resp: CTA B/L no c/r/w  CVS: S1S2 no m/r/g  Abd: soft NT/ND +BS  Ext: no c/c/e  Neuro: A&Ox3    CENTRAL LINE: [ ] YES [ ] NO  LOCATION:   DATE INSERTED:  REMOVE: [ ] YES [ ] NO      CHAN: [ ] YES [ ] NO    DATE INSERTED:  REMOVE:  [ ] YES [ ] NO      A-LINE:  [ ] YES [ ] NO  LOCATION:   DATE INSERTED:  REMOVE:  [ ] YES [ ] NO  EXPLAIN:    GLOBAL ISSUE/BEST PRACTICE:  Analgesia:  Sedation:  HOB elevation: yes  Stress ulcer prophylaxis:  VTE prophylaxis:  Oral Care:  Glycemic control:  Nutrition:    CODE STATUS: [ ] full code  [ ] DNR  [ ] DNI  [ ] MOLST  Goals of care discussion: [ ] yes HPI:  53M PMH alcohol abuse, hx of alcohol withdrawal, gerd, HLD, presents with tremors, abdominal pain, nausea, and body aches.  Pt stopped drinking day prior to presentation and now feeling agitated and anxious, with sweats similar to prior ETOH withdrawal. Also with nausea, emesis, epigastric pain. Admitted to medical service for alcohol withdrawal, gastritis and esophagitis. Pt on medical floor with worsening withdrawal/DTs, agitated, combative on ativan protocol. Multiple code jessica called for severe agitation. CIWA as high as 40s requiring four point restraints. Transferred to ICU for severe delirium tremens requiring sedation protocol.       24 hr events:  pt lethargic, phenobarbital held this AM  found to be febrile 103 and hypotensive SBP 60s  s/p 3L IVF bolus with recurrent hypotension  started on levophed for BP support  remains on precedex, titrating down dosage  NGT inserted    ## ROS:  [x] unable to obtain due to encephalopathy and sedation      ## Labs:  CBC:                        13.9   5.88  )-----------( 181      ( 30 May 2018 04:04 )             43.2     Chem:      140  |  107  |  5<L>  ----------------------------<  69<L>  3.9   |  21<L>  |  0.83    Ca    8.9      30 May 2018 04:04  Phos  3.2       Mg     2.0         TPro  6.6  /  Alb  2.4<L>  /  TBili  0.5  /  DBili  .16  /  AST  15  /  ALT  20  /  AlkPhos  41      ## Imaging:  CXR < from: Xray Chest 1 View- PORTABLE-Urgent (18 @ 11:54) >  NG tube appropriately situated in the stomach.  Negative for acute pulmonary process.      ## Medications:  ampicillin/sulbactam  IVPB 3 Gram(s) IV Intermittent every 6 hours    midodrine 10 milliGRAM(s) Oral every 8 hours  norepinephrine Infusion 0.05 MICROgram(s)/kG/Min IV Continuous <Continuous>        enoxaparin Injectable 40 milliGRAM(s) SubCutaneous daily    pantoprazole  Injectable 40 milliGRAM(s) IV Push daily  polyethylene glycol 3350 17 Gram(s) Oral daily PRN  sucralfate 1 Gram(s) Oral every 6 hours    acetaminophen  Suppository 650 milliGRAM(s) Rectal every 6 hours PRN  dexmedetomidine Infusion 0.5 MICROgram(s)/kG/Hr IV Continuous <Continuous>  PHENobarbital Injectable 130 milliGRAM(s) IV Push every 30 minutes PRN  PHENobarbital Injectable 260 milliGRAM(s) IV Push every 30 minutes PRN  PHENobarbital Injectable 130 milliGRAM(s) IV Push every 6 hours      ## Vitals:  T(C): 37 (18 @ 16:15), Max: 39.4 (18 @ 07:30)  HR: 86 (18 @ 21:00) (66 - 111)  BP: 146/83 (18 @ 21:00) (68/39 - 157/97)  BP(mean): 99 (18 @ 21:00) (45 - 112)  RR: 18 (18 @ 21:00) (14 - 31)  SpO2: 99% (18 @ 21:00) (89% - 100%)  Wt(kg): --  Vent:   AB-29 @ 07:01  -   @ 07:00  --------------------------------------------------------  IN: 1621 mL / OUT: 0 mL / NET: 1621 mL     @ 07:01  -   @ 22:08  --------------------------------------------------------  IN: 1478 mL / OUT: 250 mL / NET: 1228 mL          ## P/E:  Gen: sedated, lying comfortably in bed  HEENT: PERRL  Resp: bilateral rhonchi  CVS: S1S2 RRR  Abd: soft ND +BS  Ext: no c/c/e  Neuro: lethargic, restless off sedation    CENTRAL LINE: [ ] YES [x] NO  LOCATION:   DATE INSERTED:  REMOVE: [ ] YES [ ] NO      CHAN: [ ] YES [x] NO    DATE INSERTED:  REMOVE:  [ ] YES [ ] NO      A-LINE:  [ ] YES [x] NO  LOCATION:   DATE INSERTED:  REMOVE:  [ ] YES [ ] NO  EXPLAIN:    GLOBAL ISSUE/BEST PRACTICE:  Analgesia: n/a  Sedation: precedex, phenobarbital (held)  HOB elevation: yes  Stress ulcer prophylaxis: protonix  VTE prophylaxis: bilateral compression devices  Oral Care: n/a  Glycemic control: n/a  Nutrition: start NGT feeds    CODE STATUS: [x] full code  [ ] DNR  [ ] DNI  [ ] MOLST  Goals of care discussion: [ ] yes

## 2018-05-30 NOTE — PROGRESS NOTE ADULT - ASSESSMENT
53M PMH alcohol abuse, hx of alcohol withdrawal, gerd, HLD, presents with alcohol withdrawal, delirium tremens, alcoholic gastritis/esophagitis. Course with fever and hypotension/shock.     - titrating down precedex drip  - holding standing phenobarbital with lethargy  - prn versed and phenobarbital for breakthrough agitation  - MVI/thiamine/folate for hx of alcohol abuse  - protonix and carafate for alcohol esophagitis and gastritis  - cont 1:1 observation  - IVF hydration  - monitor electrolytes and supplement as needed  - infectious workup: check sputum cx, U x, blood cx  - start unasyn to cover for aspiration PNA  - started on levophed for BP support    Critical Care Time: 45 min

## 2018-05-30 NOTE — PROGRESS NOTE ADULT - SUBJECTIVE AND OBJECTIVE BOX
Gastroenterology  Patient seen and examined bedside.  Sedated  in ICU    T(F): 98.6 (05-30-18 @ 16:15), Max: 103 (05-30-18 @ 07:30)  HR: 68 (05-30-18 @ 16:00) (68 - 111)  BP: 86/49 (05-30-18 @ 16:00) (68/39 - 157/97)  RR: 14 (05-30-18 @ 16:00) (14 - 31)  SpO2: 96% (05-30-18 @ 16:00) (78% - 100%)  Wt(kg): --  CAPILLARY BLOOD GLUCOSE      PHYSICAL EXAM:  General: NAD, WDWN.   Neuro: unresponsive   HEENT: NCAT, EOMI, conjunctiva clear  CV: +S1+S2 regular rate and rhythm  Lung: clear to ausculation bilaterally, respirations nonlabored, good inspiratory effort  Abdomen: soft, NonTender, No distention Normal active BS  Extremities: no cyanosis, clubbing or edema  LABS:                        13.9   5.88  )-----------( 181      ( 30 May 2018 04:04 )             43.2     05-30    140  |  107  |  5<L>  ----------------------------<  69<L>  3.9   |  21<L>  |  0.83    Ca    8.9      30 May 2018 04:04  Phos  3.2     05-30  Mg     2.0     05-30    TPro  6.6  /  Alb  2.4<L>  /  TBili  0.5  /  DBili  .16  /  AST  15  /  ALT  20  /  AlkPhos  41  05-30    LIVER FUNCTIONS - ( 30 May 2018 15:00 )  Alb: 2.4 g/dL / Pro: 6.6 gm/dL / ALK PHOS: 41 U/L / ALT: 20 U/L / AST: 15 U/L / GGT: x             I&O's Detail    29 May 2018 07:01  -  30 May 2018 07:00  --------------------------------------------------------  IN:    dexmedetomidine Infusion: 521 mL    sodium chloride 0.45%: 1100 mL  Total IN: 1621 mL    OUT:  Total OUT: 0 mL    Total NET: 1621 mL      30 May 2018 07:01  -  30 May 2018 17:04  --------------------------------------------------------  IN:    dexmedetomidine Infusion: 150 mL    Lactated Ringers IV Bolus: 1000 mL  Total IN: 1150 mL    OUT:    Voided: 50 mL  Total OUT: 50 mL    Total NET: 1100 mL        05-30 @ 04:04    140 | 107 | 5  /8.9 | 2.0 | 3.2  _______________________/  3.9 | 21 | 0.83                           \par   Lipase, Serum: 67 U/L (05-30 @ 15:00) Gastroenterology  Patient seen and examined bedside.  Sedated  in ICU    T(F): 98.6 (05-30-18 @ 16:15), Max: 103 (05-30-18 @ 07:30)  HR: 68 (05-30-18 @ 16:00) (68 - 111)  BP: 86/49 (05-30-18 @ 16:00) (68/39 - 157/97)  RR: 14 (05-30-18 @ 16:00) (14 - 31)  SpO2: 96% (05-30-18 @ 16:00) (78% - 100%)  Wt(kg): --  CAPILLARY BLOOD GLUCOSE      PHYSICAL EXAM:  General: NAD, WDWN.   Neuro: responsive to stimuli  HEENT: NCAT, EOMI, conjunctiva clear  CV: +S1+S2 regular rate and rhythm  Lung: clear to ausculation bilaterally, respirations nonlabored, good inspiratory effort  Abdomen: soft, NonTender, No distention Normal active BS  Extremities: no cyanosis, clubbing or edema  LABS:                        13.9   5.88  )-----------( 181      ( 30 May 2018 04:04 )             43.2     05-30    140  |  107  |  5<L>  ----------------------------<  69<L>  3.9   |  21<L>  |  0.83    Ca    8.9      30 May 2018 04:04  Phos  3.2     05-30  Mg     2.0     05-30    TPro  6.6  /  Alb  2.4<L>  /  TBili  0.5  /  DBili  .16  /  AST  15  /  ALT  20  /  AlkPhos  41  05-30    LIVER FUNCTIONS - ( 30 May 2018 15:00 )  Alb: 2.4 g/dL / Pro: 6.6 gm/dL / ALK PHOS: 41 U/L / ALT: 20 U/L / AST: 15 U/L / GGT: x             I&O's Detail    29 May 2018 07:01  -  30 May 2018 07:00  --------------------------------------------------------  IN:    dexmedetomidine Infusion: 521 mL    sodium chloride 0.45%: 1100 mL  Total IN: 1621 mL    OUT:  Total OUT: 0 mL    Total NET: 1621 mL      30 May 2018 07:01  -  30 May 2018 17:04  --------------------------------------------------------  IN:    dexmedetomidine Infusion: 150 mL    Lactated Ringers IV Bolus: 1000 mL  Total IN: 1150 mL    OUT:    Voided: 50 mL  Total OUT: 50 mL    Total NET: 1100 mL        05-30 @ 04:04    140 | 107 | 5  /8.9 | 2.0 | 3.2  _______________________/  3.9 | 21 | 0.83                           \par   Lipase, Serum: 67 U/L (05-30 @ 15:00)

## 2018-05-31 LAB
ANION GAP SERPL CALC-SCNC: 10 MMOL/L — SIGNIFICANT CHANGE UP (ref 5–17)
BUN SERPL-MCNC: 6 MG/DL — LOW (ref 7–23)
CALCIUM SERPL-MCNC: 8.4 MG/DL — LOW (ref 8.5–10.1)
CHLORIDE SERPL-SCNC: 107 MMOL/L — SIGNIFICANT CHANGE UP (ref 96–108)
CO2 SERPL-SCNC: 23 MMOL/L — SIGNIFICANT CHANGE UP (ref 22–31)
CREAT SERPL-MCNC: 0.66 MG/DL — SIGNIFICANT CHANGE UP (ref 0.5–1.3)
CULTURE RESULTS: NO GROWTH — SIGNIFICANT CHANGE UP
GLUCOSE SERPL-MCNC: 129 MG/DL — HIGH (ref 70–99)
HCT VFR BLD CALC: 39.8 % — SIGNIFICANT CHANGE UP (ref 39–50)
HGB BLD-MCNC: 13 G/DL — SIGNIFICANT CHANGE UP (ref 13–17)
MAGNESIUM SERPL-MCNC: 1.8 MG/DL — SIGNIFICANT CHANGE UP (ref 1.6–2.6)
MCHC RBC-ENTMCNC: 28.1 PG — SIGNIFICANT CHANGE UP (ref 27–34)
MCHC RBC-ENTMCNC: 32.7 GM/DL — SIGNIFICANT CHANGE UP (ref 32–36)
MCV RBC AUTO: 86.1 FL — SIGNIFICANT CHANGE UP (ref 80–100)
NRBC # BLD: 0 /100 WBCS — SIGNIFICANT CHANGE UP (ref 0–0)
PHOSPHATE SERPL-MCNC: 2.4 MG/DL — LOW (ref 2.5–4.5)
PLATELET # BLD AUTO: 190 K/UL — SIGNIFICANT CHANGE UP (ref 150–400)
POTASSIUM SERPL-MCNC: 3.1 MMOL/L — LOW (ref 3.5–5.3)
POTASSIUM SERPL-SCNC: 3.1 MMOL/L — LOW (ref 3.5–5.3)
PROCALCITONIN SERPL-MCNC: 0.69 NG/ML — HIGH (ref 0.02–0.1)
RBC # BLD: 4.62 M/UL — SIGNIFICANT CHANGE UP (ref 4.2–5.8)
RBC # FLD: 12.9 % — SIGNIFICANT CHANGE UP (ref 10.3–14.5)
SODIUM SERPL-SCNC: 140 MMOL/L — SIGNIFICANT CHANGE UP (ref 135–145)
SPECIMEN SOURCE: SIGNIFICANT CHANGE UP
WBC # BLD: 5.34 K/UL — SIGNIFICANT CHANGE UP (ref 3.8–10.5)
WBC # FLD AUTO: 5.34 K/UL — SIGNIFICANT CHANGE UP (ref 3.8–10.5)

## 2018-05-31 PROCEDURE — 99291 CRITICAL CARE FIRST HOUR: CPT

## 2018-05-31 RX ORDER — POTASSIUM CHLORIDE 20 MEQ
40 PACKET (EA) ORAL ONCE
Qty: 0 | Refills: 0 | Status: COMPLETED | OUTPATIENT
Start: 2018-05-31 | End: 2018-05-31

## 2018-05-31 RX ORDER — POTASSIUM CHLORIDE 20 MEQ
10 PACKET (EA) ORAL
Qty: 0 | Refills: 0 | Status: COMPLETED | OUTPATIENT
Start: 2018-05-31 | End: 2018-05-31

## 2018-05-31 RX ORDER — POTASSIUM PHOSPHATE, MONOBASIC POTASSIUM PHOSPHATE, DIBASIC 236; 224 MG/ML; MG/ML
15 INJECTION, SOLUTION INTRAVENOUS ONCE
Qty: 0 | Refills: 0 | Status: COMPLETED | OUTPATIENT
Start: 2018-05-31 | End: 2018-05-31

## 2018-05-31 RX ADMIN — DEXMEDETOMIDINE HYDROCHLORIDE IN 0.9% SODIUM CHLORIDE 9.64 MICROGRAM(S)/KG/HR: 4 INJECTION INTRAVENOUS at 20:20

## 2018-05-31 RX ADMIN — Medication 1 MILLIGRAM(S): at 11:46

## 2018-05-31 RX ADMIN — Medication 1 GRAM(S): at 00:37

## 2018-05-31 RX ADMIN — DEXMEDETOMIDINE HYDROCHLORIDE IN 0.9% SODIUM CHLORIDE 9.64 MICROGRAM(S)/KG/HR: 4 INJECTION INTRAVENOUS at 09:00

## 2018-05-31 RX ADMIN — Medication 130 MILLIGRAM(S): at 16:17

## 2018-05-31 RX ADMIN — Medication 1 TABLET(S): at 11:46

## 2018-05-31 RX ADMIN — Medication 100 MILLIEQUIVALENT(S): at 09:34

## 2018-05-31 RX ADMIN — Medication 130 MILLIGRAM(S): at 22:40

## 2018-05-31 RX ADMIN — Medication 130 MILLIGRAM(S): at 11:00

## 2018-05-31 RX ADMIN — AMPICILLIN SODIUM AND SULBACTAM SODIUM 200 GRAM(S): 250; 125 INJECTION, POWDER, FOR SUSPENSION INTRAMUSCULAR; INTRAVENOUS at 17:51

## 2018-05-31 RX ADMIN — Medication 100 MILLIEQUIVALENT(S): at 06:00

## 2018-05-31 RX ADMIN — Medication 1 GRAM(S): at 17:51

## 2018-05-31 RX ADMIN — Medication 1 GRAM(S): at 11:46

## 2018-05-31 RX ADMIN — PANTOPRAZOLE SODIUM 40 MILLIGRAM(S): 20 TABLET, DELAYED RELEASE ORAL at 11:46

## 2018-05-31 RX ADMIN — AMPICILLIN SODIUM AND SULBACTAM SODIUM 200 GRAM(S): 250; 125 INJECTION, POWDER, FOR SUSPENSION INTRAMUSCULAR; INTRAVENOUS at 06:00

## 2018-05-31 RX ADMIN — Medication 100 MILLIGRAM(S): at 11:46

## 2018-05-31 RX ADMIN — AMPICILLIN SODIUM AND SULBACTAM SODIUM 200 GRAM(S): 250; 125 INJECTION, POWDER, FOR SUSPENSION INTRAMUSCULAR; INTRAVENOUS at 12:13

## 2018-05-31 RX ADMIN — POTASSIUM PHOSPHATE, MONOBASIC POTASSIUM PHOSPHATE, DIBASIC 63.75 MILLIMOLE(S): 236; 224 INJECTION, SOLUTION INTRAVENOUS at 14:30

## 2018-05-31 RX ADMIN — MIDODRINE HYDROCHLORIDE 10 MILLIGRAM(S): 2.5 TABLET ORAL at 13:35

## 2018-05-31 RX ADMIN — ENOXAPARIN SODIUM 40 MILLIGRAM(S): 100 INJECTION SUBCUTANEOUS at 11:46

## 2018-05-31 RX ADMIN — Medication 100 MILLIEQUIVALENT(S): at 08:06

## 2018-05-31 RX ADMIN — MIDODRINE HYDROCHLORIDE 10 MILLIGRAM(S): 2.5 TABLET ORAL at 06:55

## 2018-05-31 RX ADMIN — Medication 40 MILLIEQUIVALENT(S): at 07:07

## 2018-05-31 RX ADMIN — Medication 130 MILLIGRAM(S): at 16:06

## 2018-05-31 RX ADMIN — DEXMEDETOMIDINE HYDROCHLORIDE IN 0.9% SODIUM CHLORIDE 9.64 MICROGRAM(S)/KG/HR: 4 INJECTION INTRAVENOUS at 13:35

## 2018-05-31 RX ADMIN — Medication 130 MILLIGRAM(S): at 04:10

## 2018-05-31 RX ADMIN — MIDODRINE HYDROCHLORIDE 10 MILLIGRAM(S): 2.5 TABLET ORAL at 22:40

## 2018-05-31 RX ADMIN — Medication 1 GRAM(S): at 06:55

## 2018-05-31 RX ADMIN — DEXMEDETOMIDINE HYDROCHLORIDE IN 0.9% SODIUM CHLORIDE 9.64 MICROGRAM(S)/KG/HR: 4 INJECTION INTRAVENOUS at 11:00

## 2018-05-31 RX ADMIN — DEXMEDETOMIDINE HYDROCHLORIDE IN 0.9% SODIUM CHLORIDE 9.64 MICROGRAM(S)/KG/HR: 4 INJECTION INTRAVENOUS at 17:12

## 2018-05-31 RX ADMIN — CHLORHEXIDINE GLUCONATE 1 APPLICATION(S): 213 SOLUTION TOPICAL at 04:46

## 2018-05-31 RX ADMIN — AMPICILLIN SODIUM AND SULBACTAM SODIUM 200 GRAM(S): 250; 125 INJECTION, POWDER, FOR SUSPENSION INTRAMUSCULAR; INTRAVENOUS at 00:25

## 2018-05-31 NOTE — PROGRESS NOTE ADULT - ASSESSMENT
53M PMH alcohol abuse, hx of alcohol withdrawal, gerd, HLD, presents with alcohol withdrawal, delirium tremens, alcoholic gastritis/esophagitis. Course with fever and hypotension/shock.     - wean off precedex drip as tolerates  - remains on standing phenobarbital   - prn versed and phenobarbital for breakthrough agitation  - MVI/thiamine/folate for hx of alcohol abuse  - protonix and carafate for alcohol esophagitis and gastritis  - cont 1:1 observation  - IVF hydration  - supplement hypokalemia and hypophosphatemia   - infectious workup: negative to date  - cont unasyn (day #2) to cover for aspiration PNA  - monitor BP off levo, remains on midodrine    Critical Care Time: 35 min

## 2018-05-31 NOTE — PROGRESS NOTE ADULT - SUBJECTIVE AND OBJECTIVE BOX
HPI:  53 year old man with PMH gerd, HLD, etoh abuse presents with complaint of tremors, abdominal pain, nausea, and body aches.  He states he stopped drinking yesterday and this feels typical of his withdrawals.  He feels agitated and anxious, has been sweating.    In the ED, work up consistent with gastritis with esophagitis, CIWA 10.  Lactate 6.1-->3.2. (25 May 2018 03:23)      24 hr events:      ## ROS:  [ ] unable to obtain  CONSTITUTIONAL: No fever, weight loss, or fatigue  EYES: No eye pain, visual disturbances, or discharge  ENMT:  No difficulty hearing, tinnitus, vertigo; No sinus or throat pain  NECK: No pain or stiffness  RESPIRATORY: No cough, wheezing, chills or hemoptysis; No shortness of breath  CARDIOVASCULAR: No chest pain, palpitations, dizziness, or leg swelling  GASTROINTESTINAL: No abdominal or epigastric pain. No nausea, vomiting, or hematemesis; No diarrhea or constipation. No melena or hematochezia.  GENITOURINARY: No dysuria, frequency, hematuria, or incontinence  NEUROLOGICAL: No headaches, memory loss, loss of strength, numbness, or tremors  SKIN: No itching, burning, rashes, or lesions   LYMPH NODES: No enlarged glands  ENDOCRINE: No heat or cold intolerance; No hair loss  MUSCULOSKELETAL: No joint pain or swelling; No muscle, back, or extremity pain  PSYCHIATRIC: No depression, anxiety, mood swings, or difficulty sleeping  HEME/LYMPH: No easy bruising, or bleeding gums  ALLERGY AND IMMUNOLOGIC: No hives or eczema    ## Labs:  CBC:                        13.0   5.34  )-----------( 190      ( 31 May 2018 04:16 )             39.8     Chem:      140  |  107  |  6<L>  ----------------------------<  129<H>  3.1<L>   |  23  |  0.66    Ca    8.4<L>      31 May 2018 04:16  Phos  2.4       Mg     1.8         TPro  6.6  /  Alb  2.4<L>  /  TBili  0.5  /  DBili  .16  /  AST  15  /  ALT  20  /  AlkPhos  41      Coags:          ## Imaging:    ## Medications:  ampicillin/sulbactam  IVPB 3 Gram(s) IV Intermittent every 6 hours    midodrine 10 milliGRAM(s) Oral every 8 hours  norepinephrine Infusion 0.05 MICROgram(s)/kG/Min IV Continuous <Continuous>        enoxaparin Injectable 40 milliGRAM(s) SubCutaneous daily    pantoprazole  Injectable 40 milliGRAM(s) IV Push daily  polyethylene glycol 3350 17 Gram(s) Oral daily PRN  sucralfate 1 Gram(s) Oral every 6 hours    acetaminophen  Suppository 650 milliGRAM(s) Rectal every 6 hours PRN  dexmedetomidine Infusion 0.5 MICROgram(s)/kG/Hr IV Continuous <Continuous>  PHENobarbital Injectable 130 milliGRAM(s) IV Push every 30 minutes PRN  PHENobarbital Injectable 260 milliGRAM(s) IV Push every 30 minutes PRN  PHENobarbital Injectable 130 milliGRAM(s) IV Push every 6 hours      ## Vitals:  T(C): 37 (18 @ 21:00), Max: 37.4 (18 @ 11:55)  HR: 65 (18 @ 21:13) (58 - 84)  BP: 109/72 (18 @ 21:13) (99/66 - 178/89)  BP(mean): 80 (18 @ 21:13) (68 - 114)  RR: 18 (18 @ 21:13) (13 - 27)  SpO2: 96% (18 @ 21:13) (92% - 100%)  Wt(kg): --  Vent:   AB-30 @ 07:  -   @ 07:00  --------------------------------------------------------  IN: 2361.2 mL / OUT: 1250 mL / NET: 1111.2 mL     @ 07:01  -   @ 22:04  --------------------------------------------------------  IN: 1302 mL / OUT: 1400 mL / NET: -98 mL          ## P/E:  Gen: lying comfortably in bed in no apparent distress  HEENT: PERRL, EOMI  Resp: CTA B/L no c/r/w  CVS: S1S2 no m/r/g  Abd: soft NT/ND +BS  Ext: no c/c/e  Neuro: A&Ox3    CENTRAL LINE: [ ] YES [ ] NO  LOCATION:   DATE INSERTED:  REMOVE: [ ] YES [ ] NO      CHAN: [ ] YES [ ] NO    DATE INSERTED:  REMOVE:  [ ] YES [ ] NO      A-LINE:  [ ] YES [ ] NO  LOCATION:   DATE INSERTED:  REMOVE:  [ ] YES [ ] NO  EXPLAIN:    GLOBAL ISSUE/BEST PRACTICE:  Analgesia:  Sedation:  HOB elevation: yes  Stress ulcer prophylaxis:  VTE prophylaxis:  Oral Care:  Glycemic control:  Nutrition:    CODE STATUS: [ ] full code  [ ] DNR  [ ] DNI  [ ] MOLST  Goals of care discussion: [ ] yes HPI:  53M PMH alcohol abuse, hx of alcohol withdrawal, gerd, HLD, presents with tremors, abdominal pain, nausea, and body aches.  Pt stopped drinking day prior to presentation and now feeling agitated and anxious, with sweats similar to prior ETOH withdrawal. Also with nausea, emesis, epigastric pain. Admitted to medical service for alcohol withdrawal, gastritis and esophagitis. Pt on medical floor with worsening withdrawal/DTs, agitated, combative on ativan protocol. Multiple code jessica called for severe agitation. CIWA as high as 40s requiring four point restraints. Transferred to ICU for severe delirium tremens requiring sedation protocol.      24 hr events:  no further fevers  levophed started yesterday afternoon and weaned off overnight  remains on midodrine  pt with intermittent outbursts of restlessness  remains on precedex drip and phenobarbital    ## ROS:  [x] unable to obtain due to DTs and sedation      ## Labs:  CBC:                        13.0   5.34  )-----------( 190      ( 31 May 2018 04:16 )             39.8     Chem:  05-31    140  |  107  |  6<L>  ----------------------------<  129<H>  3.1<L>   |  23  |  0.66    Ca    8.4<L>      31 May 2018 04:16  Phos  2.4     05-31  Mg     1.8     05-31    TPro  6.6  /  Alb  2.4<L>  /  TBili  0.5  /  DBili  .16  /  AST  15  /  ALT  20  /  AlkPhos  41  05-30      Lipase, Serum (05.30.18 @ 15:00)    Lipase, Serum: 67 U/L    Procalcitonin, Serum (05.31.18 @ 00:15)    Procalcitonin, Serum: 0.69 ng/mL    Culture - Urine (05.30.18 @ 10:24)    Specimen Source: .Urine Catheterized    Culture Results: No growth    Culture - Sputum . (05.30.18 @ 10:09)    Specimen Source: .Sputum Sputum    Culture Results: Normal Respiratory Melanie present    Culture - Blood (05.30.18 @ 09:55)    Specimen Source: .Blood Blood    Culture Results: No growth to date.      ## Imaging:  CXR < from: Xray Chest 1 View- PORTABLE-Urgent (05.30.18 @ 11:54) >  NG tube appropriately situated in the stomach.  Negative for acute pulmonary process.      ## Medications:  ampicillin/sulbactam  IVPB 3 Gram(s) IV Intermittent every 6 hours    midodrine 10 milliGRAM(s) Oral every 8 hours  norepinephrine Infusion 0.05 MICROgram(s)/kG/Min IV Continuous <Continuous>        enoxaparin Injectable 40 milliGRAM(s) SubCutaneous daily    pantoprazole  Injectable 40 milliGRAM(s) IV Push daily  polyethylene glycol 3350 17 Gram(s) Oral daily PRN  sucralfate 1 Gram(s) Oral every 6 hours    acetaminophen  Suppository 650 milliGRAM(s) Rectal every 6 hours PRN  dexmedetomidine Infusion 0.5 MICROgram(s)/kG/Hr IV Continuous <Continuous>  PHENobarbital Injectable 130 milliGRAM(s) IV Push every 30 minutes PRN  PHENobarbital Injectable 260 milliGRAM(s) IV Push every 30 minutes PRN  PHENobarbital Injectable 130 milliGRAM(s) IV Push every 6 hours      ## Vitals:  T(C): 37 (05-31-18 @ 21:00), Max: 37.4 (05-31-18 @ 11:55)  HR: 65 (05-31-18 @ 21:13) (58 - 84)  BP: 109/72 (05-31-18 @ 21:13) (99/66 - 178/89)  BP(mean): 80 (05-31-18 @ 21:13) (68 - 114)  RR: 18 (05-31-18 @ 21:13) (13 - 27)  SpO2: 96% (05-31-18 @ 21:13) (92% - 100%)        05-30 @ 07:01  -  05-31 @ 07:00  --------------------------------------------------------  IN: 2361.2 mL / OUT: 1250 mL / NET: 1111.2 mL    05-31 @ 07:01  -  05-31 @ 22:04  --------------------------------------------------------  IN: 1302 mL / OUT: 1400 mL / NET: -98 mL          ## P/E:  Gen: sedated, lying comfortably in bed  HEENT: PERRL  Resp: bilateral rhonchi  CVS: S1S2 RRR  Abd: soft ND +BS  Ext: no c/c/e  Neuro: lethargic, restless off sedation    CENTRAL LINE: [ ] YES [x] NO  LOCATION:   DATE INSERTED:  REMOVE: [ ] YES [ ] NO      CHAN: [ ] YES [x] NO    DATE INSERTED:  REMOVE:  [ ] YES [ ] NO      A-LINE:  [ ] YES [x] NO  LOCATION:   DATE INSERTED:  REMOVE:  [ ] YES [ ] NO  EXPLAIN:    GLOBAL ISSUE/BEST PRACTICE:  Analgesia: n/a  Sedation: precedex, phenobarbital  HOB elevation: yes  Stress ulcer prophylaxis: protonix  VTE prophylaxis: bilateral compression devices  Oral Care: n/a  Glycemic control: n/a  Nutrition:  NGT feeds    CODE STATUS: [x] full code  [ ] DNR  [ ] DNI  [ ] MOLST  Goals of care discussion: [ ] yes

## 2018-06-01 LAB
ALBUMIN SERPL ELPH-MCNC: 2.7 G/DL — LOW (ref 3.3–5)
ALP SERPL-CCNC: 49 U/L — SIGNIFICANT CHANGE UP (ref 40–120)
ALT FLD-CCNC: 20 U/L — SIGNIFICANT CHANGE UP (ref 12–78)
ANION GAP SERPL CALC-SCNC: 10 MMOL/L — SIGNIFICANT CHANGE UP (ref 5–17)
AST SERPL-CCNC: 19 U/L — SIGNIFICANT CHANGE UP (ref 15–37)
BASOPHILS # BLD AUTO: 0.04 K/UL — SIGNIFICANT CHANGE UP (ref 0–0.2)
BASOPHILS NFR BLD AUTO: 0.7 % — SIGNIFICANT CHANGE UP (ref 0–2)
BILIRUB SERPL-MCNC: 0.3 MG/DL — SIGNIFICANT CHANGE UP (ref 0.2–1.2)
BUN SERPL-MCNC: 6 MG/DL — LOW (ref 7–23)
CALCIUM SERPL-MCNC: 8.8 MG/DL — SIGNIFICANT CHANGE UP (ref 8.5–10.1)
CHLORIDE SERPL-SCNC: 108 MMOL/L — SIGNIFICANT CHANGE UP (ref 96–108)
CO2 SERPL-SCNC: 24 MMOL/L — SIGNIFICANT CHANGE UP (ref 22–31)
CREAT SERPL-MCNC: 0.85 MG/DL — SIGNIFICANT CHANGE UP (ref 0.5–1.3)
EOSINOPHIL # BLD AUTO: 0.18 K/UL — SIGNIFICANT CHANGE UP (ref 0–0.5)
EOSINOPHIL NFR BLD AUTO: 3.3 % — SIGNIFICANT CHANGE UP (ref 0–6)
GLUCOSE SERPL-MCNC: 130 MG/DL — HIGH (ref 70–99)
HCT VFR BLD CALC: 41.3 % — SIGNIFICANT CHANGE UP (ref 39–50)
HGB BLD-MCNC: 13.2 G/DL — SIGNIFICANT CHANGE UP (ref 13–17)
IMM GRANULOCYTES NFR BLD AUTO: 0.5 % — SIGNIFICANT CHANGE UP (ref 0–1.5)
LYMPHOCYTES # BLD AUTO: 0.77 K/UL — LOW (ref 1–3.3)
LYMPHOCYTES # BLD AUTO: 13.9 % — SIGNIFICANT CHANGE UP (ref 13–44)
MAGNESIUM SERPL-MCNC: 2 MG/DL — SIGNIFICANT CHANGE UP (ref 1.6–2.6)
MCHC RBC-ENTMCNC: 28 PG — SIGNIFICANT CHANGE UP (ref 27–34)
MCHC RBC-ENTMCNC: 32 GM/DL — SIGNIFICANT CHANGE UP (ref 32–36)
MCV RBC AUTO: 87.5 FL — SIGNIFICANT CHANGE UP (ref 80–100)
MONOCYTES # BLD AUTO: 0.94 K/UL — HIGH (ref 0–0.9)
MONOCYTES NFR BLD AUTO: 17 % — HIGH (ref 2–14)
NEUTROPHILS # BLD AUTO: 3.57 K/UL — SIGNIFICANT CHANGE UP (ref 1.8–7.4)
NEUTROPHILS NFR BLD AUTO: 64.6 % — SIGNIFICANT CHANGE UP (ref 43–77)
NRBC # BLD: 0 /100 WBCS — SIGNIFICANT CHANGE UP (ref 0–0)
PHOSPHATE SERPL-MCNC: 1.4 MG/DL — LOW (ref 2.5–4.5)
PLATELET # BLD AUTO: 226 K/UL — SIGNIFICANT CHANGE UP (ref 150–400)
POTASSIUM SERPL-MCNC: 3.5 MMOL/L — SIGNIFICANT CHANGE UP (ref 3.5–5.3)
POTASSIUM SERPL-SCNC: 3.5 MMOL/L — SIGNIFICANT CHANGE UP (ref 3.5–5.3)
PROT SERPL-MCNC: 7.6 GM/DL — SIGNIFICANT CHANGE UP (ref 6–8.3)
RBC # BLD: 4.72 M/UL — SIGNIFICANT CHANGE UP (ref 4.2–5.8)
RBC # FLD: 12.8 % — SIGNIFICANT CHANGE UP (ref 10.3–14.5)
SODIUM SERPL-SCNC: 142 MMOL/L — SIGNIFICANT CHANGE UP (ref 135–145)
WBC # BLD: 5.53 K/UL — SIGNIFICANT CHANGE UP (ref 3.8–10.5)
WBC # FLD AUTO: 5.53 K/UL — SIGNIFICANT CHANGE UP (ref 3.8–10.5)

## 2018-06-01 PROCEDURE — 99291 CRITICAL CARE FIRST HOUR: CPT

## 2018-06-01 PROCEDURE — 71045 X-RAY EXAM CHEST 1 VIEW: CPT | Mod: 26

## 2018-06-01 RX ORDER — POTASSIUM PHOSPHATE, MONOBASIC POTASSIUM PHOSPHATE, DIBASIC 236; 224 MG/ML; MG/ML
15 INJECTION, SOLUTION INTRAVENOUS ONCE
Qty: 0 | Refills: 0 | Status: COMPLETED | OUTPATIENT
Start: 2018-06-01 | End: 2018-06-01

## 2018-06-01 RX ORDER — PANTOPRAZOLE SODIUM 20 MG/1
40 TABLET, DELAYED RELEASE ORAL
Qty: 0 | Refills: 0 | Status: DISCONTINUED | OUTPATIENT
Start: 2018-06-02 | End: 2018-06-10

## 2018-06-01 RX ORDER — MIDAZOLAM HYDROCHLORIDE 1 MG/ML
2 INJECTION, SOLUTION INTRAMUSCULAR; INTRAVENOUS EVERY 4 HOURS
Qty: 0 | Refills: 0 | Status: DISCONTINUED | OUTPATIENT
Start: 2018-06-01 | End: 2018-06-06

## 2018-06-01 RX ADMIN — Medication 260 MILLIGRAM(S): at 01:09

## 2018-06-01 RX ADMIN — DEXMEDETOMIDINE HYDROCHLORIDE IN 0.9% SODIUM CHLORIDE 9.64 MICROGRAM(S)/KG/HR: 4 INJECTION INTRAVENOUS at 00:37

## 2018-06-01 RX ADMIN — Medication 100 MILLIGRAM(S): at 12:50

## 2018-06-01 RX ADMIN — Medication 1 TABLET(S): at 12:50

## 2018-06-01 RX ADMIN — DEXMEDETOMIDINE HYDROCHLORIDE IN 0.9% SODIUM CHLORIDE 9.64 MICROGRAM(S)/KG/HR: 4 INJECTION INTRAVENOUS at 16:22

## 2018-06-01 RX ADMIN — Medication 1 GRAM(S): at 17:31

## 2018-06-01 RX ADMIN — Medication 130 MILLIGRAM(S): at 16:19

## 2018-06-01 RX ADMIN — POTASSIUM PHOSPHATE, MONOBASIC POTASSIUM PHOSPHATE, DIBASIC 63.75 MILLIMOLE(S): 236; 224 INJECTION, SOLUTION INTRAVENOUS at 07:38

## 2018-06-01 RX ADMIN — Medication 130 MILLIGRAM(S): at 10:24

## 2018-06-01 RX ADMIN — AMPICILLIN SODIUM AND SULBACTAM SODIUM 200 GRAM(S): 250; 125 INJECTION, POWDER, FOR SUSPENSION INTRAMUSCULAR; INTRAVENOUS at 06:02

## 2018-06-01 RX ADMIN — Medication 1 GRAM(S): at 00:44

## 2018-06-01 RX ADMIN — AMPICILLIN SODIUM AND SULBACTAM SODIUM 200 GRAM(S): 250; 125 INJECTION, POWDER, FOR SUSPENSION INTRAMUSCULAR; INTRAVENOUS at 00:44

## 2018-06-01 RX ADMIN — ENOXAPARIN SODIUM 40 MILLIGRAM(S): 100 INJECTION SUBCUTANEOUS at 12:51

## 2018-06-01 RX ADMIN — AMPICILLIN SODIUM AND SULBACTAM SODIUM 200 GRAM(S): 250; 125 INJECTION, POWDER, FOR SUSPENSION INTRAMUSCULAR; INTRAVENOUS at 17:31

## 2018-06-01 RX ADMIN — AMPICILLIN SODIUM AND SULBACTAM SODIUM 200 GRAM(S): 250; 125 INJECTION, POWDER, FOR SUSPENSION INTRAMUSCULAR; INTRAVENOUS at 12:50

## 2018-06-01 RX ADMIN — Medication 100 MILLIGRAM(S): at 14:57

## 2018-06-01 RX ADMIN — Medication 130 MILLIGRAM(S): at 03:50

## 2018-06-01 RX ADMIN — DEXMEDETOMIDINE HYDROCHLORIDE IN 0.9% SODIUM CHLORIDE 9.64 MICROGRAM(S)/KG/HR: 4 INJECTION INTRAVENOUS at 08:55

## 2018-06-01 RX ADMIN — Medication 1 MILLIGRAM(S): at 12:50

## 2018-06-01 RX ADMIN — MIDODRINE HYDROCHLORIDE 10 MILLIGRAM(S): 2.5 TABLET ORAL at 06:02

## 2018-06-01 RX ADMIN — Medication 130 MILLIGRAM(S): at 08:00

## 2018-06-01 RX ADMIN — Medication 1 GRAM(S): at 06:02

## 2018-06-01 RX ADMIN — PANTOPRAZOLE SODIUM 40 MILLIGRAM(S): 20 TABLET, DELAYED RELEASE ORAL at 12:50

## 2018-06-01 RX ADMIN — Medication 1 GRAM(S): at 12:50

## 2018-06-01 RX ADMIN — CHLORHEXIDINE GLUCONATE 1 APPLICATION(S): 213 SOLUTION TOPICAL at 06:01

## 2018-06-01 RX ADMIN — DEXMEDETOMIDINE HYDROCHLORIDE IN 0.9% SODIUM CHLORIDE 9.64 MICROGRAM(S)/KG/HR: 4 INJECTION INTRAVENOUS at 11:44

## 2018-06-01 NOTE — PROGRESS NOTE ADULT - ASSESSMENT
53M PMH alcohol abuse, hx of alcohol withdrawal, gerd, HLD, presents with alcohol withdrawal, delirium tremens, alcoholic gastritis/esophagitis. Course with fever and hypotension/shock due to PNA.     - has been on precedex drip and unable to wean down due to agitation  - remains on standing phenobarbital   - prn versed and phenobarbital for breakthrough agitation  - will add standing librium in attempt to wean off precedex and to hopefully not required any breathrough meds for agitation  - MVI/thiamine/folate for alcohol abuse  - protonix and carafate for alcohol esophagitis and gastritis  - cont 1:1 observation  - supplement hypophosphatemia   - sputum culture now growing staph aureus, follow up sensitivities  - cont unasyn (day #3) to cover for aspiration PNA which should cover staph as well  - monitor BP off levo, d/c midodrine as pt becoming hypertensive  - d/w wife at bedside this morning pt's condition and plan of care    Critical Care Time: 35 min

## 2018-06-01 NOTE — PROGRESS NOTE ADULT - SUBJECTIVE AND OBJECTIVE BOX
HPI:  53 year old man with PMH gerd, HLD, etoh abuse presents with complaint of tremors, abdominal pain, nausea, and body aches.  He states he stopped drinking yesterday and this feels typical of his withdrawals.  He feels agitated and anxious, has been sweating.    In the ED, work up consistent with gastritis with esophagitis, CIWA 10.  Lactate 6.1-->3.2. (25 May 2018 03:23)      24 hr events:      ## ROS:  [ ] unable to obtain  CONSTITUTIONAL: No fever, weight loss, or fatigue  EYES: No eye pain, visual disturbances, or discharge  ENMT:  No difficulty hearing, tinnitus, vertigo; No sinus or throat pain  NECK: No pain or stiffness  RESPIRATORY: No cough, wheezing, chills or hemoptysis; No shortness of breath  CARDIOVASCULAR: No chest pain, palpitations, dizziness, or leg swelling  GASTROINTESTINAL: No abdominal or epigastric pain. No nausea, vomiting, or hematemesis; No diarrhea or constipation. No melena or hematochezia.  GENITOURINARY: No dysuria, frequency, hematuria, or incontinence  NEUROLOGICAL: No headaches, memory loss, loss of strength, numbness, or tremors  SKIN: No itching, burning, rashes, or lesions   LYMPH NODES: No enlarged glands  ENDOCRINE: No heat or cold intolerance; No hair loss  MUSCULOSKELETAL: No joint pain or swelling; No muscle, back, or extremity pain  PSYCHIATRIC: No depression, anxiety, mood swings, or difficulty sleeping  HEME/LYMPH: No easy bruising, or bleeding gums  ALLERGY AND IMMUNOLOGIC: No hives or eczema    ## Labs:  CBC:                        13.2   5.53  )-----------( 226      ( 2018 04:26 )             41.3     Chem:  -    142  |  108  |  6<L>  ----------------------------<  130<H>  3.5   |  24  |  0.85    Ca    8.8      2018 04:26  Phos  1.4     -  Mg     2.0     -    TPro  7.6  /  Alb  2.7<L>  /  TBili  0.3  /  DBili  x   /  AST  19  /  ALT  20  /  AlkPhos  49  -    Coags:          ## Imaging:    ## Medications:  ampicillin/sulbactam  IVPB 3 Gram(s) IV Intermittent every 6 hours          enoxaparin Injectable 40 milliGRAM(s) SubCutaneous daily    polyethylene glycol 3350 17 Gram(s) Oral daily PRN  sucralfate 1 Gram(s) Oral every 6 hours    acetaminophen  Suppository 650 milliGRAM(s) Rectal every 6 hours PRN  chlordiazePOXIDE 100 milliGRAM(s) Oral every 8 hours  dexmedetomidine Infusion 0.5 MICROgram(s)/kG/Hr IV Continuous <Continuous>  midazolam Injectable 2 milliGRAM(s) IV Push every 4 hours PRN  PHENobarbital Injectable 130 milliGRAM(s) IV Push every 30 minutes PRN  PHENobarbital Injectable 260 milliGRAM(s) IV Push every 30 minutes PRN  PHENobarbital Injectable 130 milliGRAM(s) IV Push every 6 hours      ## Vitals:  T(C): 37.6 (18 @ 16:12), Max: 37.6 (18 @ 05:38)  HR: 86 (18 @ 16:00) (61 - 116)  BP: 102/76 (18 @ 16:00) (96/58 - 172/87)  BP(mean): 83 (18 @ 16:00) (66 - 107)  RR: 18 (18 @ 16:00) (14 - 34)  SpO2: 97% (18 @ 16:00) (91% - 100%)  Wt(kg): --  Vent:   AB @ :  -   @ 07:00  --------------------------------------------------------  IN: 2257 mL / OUT: 1800 mL / NET: 457 mL     @ 07:  -   @ 17:15  --------------------------------------------------------  IN: 822.2 mL / OUT: 0 mL / NET: 822.2 mL          ## P/E:  Gen: lying comfortably in bed in no apparent distress  HEENT: PERRL, EOMI  Resp: CTA B/L no c/r/w  CVS: S1S2 no m/r/g  Abd: soft NT/ND +BS  Ext: no c/c/e  Neuro: A&Ox3    CENTRAL LINE: [ ] YES [ ] NO  LOCATION:   DATE INSERTED:  REMOVE: [ ] YES [ ] NO      CHAN: [ ] YES [ ] NO    DATE INSERTED:  REMOVE:  [ ] YES [ ] NO      A-LINE:  [ ] YES [ ] NO  LOCATION:   DATE INSERTED:  REMOVE:  [ ] YES [ ] NO  EXPLAIN:    GLOBAL ISSUE/BEST PRACTICE:  Analgesia:  Sedation:  HOB elevation: yes  Stress ulcer prophylaxis:  VTE prophylaxis:  Oral Care:  Glycemic control:  Nutrition:    CODE STATUS: [ ] full code  [ ] DNR  [ ] DNI  [ ] MOLST  Goals of care discussion: [ ] yes HPI:  53M PMH alcohol abuse, hx of alcohol withdrawal, gerd, HLD, presents with tremors, abdominal pain, nausea, and body aches.  Pt stopped drinking day prior to presentation and now feeling agitated and anxious, with sweats similar to prior ETOH withdrawal. Also with nausea, emesis, epigastric pain. Admitted to medical service for alcohol withdrawal, gastritis and esophagitis. Pt on medical floor with worsening withdrawal/DTs, agitated, combative on ativan protocol. Multiple code jessica called for severe agitation. CIWA as high as 40s requiring four point restraints. Transferred to ICU for severe delirium tremens requiring sedation protocol.        24 hr events:  remains afebrile on antibiotics  still with thick secretions suctioned from patient  required prn phenobarbital due to agitation/restlessness  remains on precedex gtt, unable to wean off due to agitation    ## ROS:  [ x ] unable to obtain due to sedation and withdrawal      ## Labs:  CBC:                        13.2   5.53  )-----------( 226      ( 2018 04:26 )             41.3     Chem:      142  |  108  |  6<L>  ----------------------------<  130<H>  3.5   |  24  |  0.85    Ca    8.8      2018 04:26  Phos  1.4       Mg     2.0         TPro  7.6  /  Alb  2.7<L>  /  TBili  0.3  /  AST  19  /  ALT  20  /  AlkPhos  49  06-01    Culture - Sputum . (18 @ 10:09)    Specimen Source: .Sputum Sputum    Culture Results:  Moderate Staphylococcus aureus    Culture - Urine (18 @ 10:24)    Specimen Source: .Urine Catheterized    Culture Results: No growth    Culture - Blood (18 @ 09:55)    Specimen Source: .Blood Blood    Culture Results: No growth to date.      ## Medications:  ampicillin/sulbactam  IVPB 3 Gram(s) IV Intermittent every 6 hours          enoxaparin Injectable 40 milliGRAM(s) SubCutaneous daily    polyethylene glycol 3350 17 Gram(s) Oral daily PRN  sucralfate 1 Gram(s) Oral every 6 hours    acetaminophen  Suppository 650 milliGRAM(s) Rectal every 6 hours PRN  chlordiazePOXIDE 100 milliGRAM(s) Oral every 8 hours  dexmedetomidine Infusion 0.5 MICROgram(s)/kG/Hr IV Continuous <Continuous>  midazolam Injectable 2 milliGRAM(s) IV Push every 4 hours PRN  PHENobarbital Injectable 130 milliGRAM(s) IV Push every 30 minutes PRN  PHENobarbital Injectable 260 milliGRAM(s) IV Push every 30 minutes PRN  PHENobarbital Injectable 130 milliGRAM(s) IV Push every 6 hours      ## Vitals:  T(C): 37.6 (18 @ 16:12), Max: 37.6 (18 @ 05:38)  HR: 86 (18 @ 16:00) (61 - 116)  BP: 102/76 (18 @ 16:00) (96/58 - 172/87)  BP(mean): 83 (18 @ 16:00) (66 - 107)  RR: 18 (18 @ 16:00) (14 - 34)  SpO2: 97% (18 @ 16:00) (91% - 100%)  Wt(kg): --  Vent:   AB-31 @ 07:  -   @ 07:00  --------------------------------------------------------  IN: 2257 mL / OUT: 1800 mL / NET: 457 mL     @ 07:01  -   @ 17:15  --------------------------------------------------------  IN: 822.2 mL / OUT: 0 mL / NET: 822.2 mL          ## P/E:  Gen: lying comfortably in bed in no apparent distress  HEENT: PERRL, EOMI  Resp: CTA B/L no c/r/w  CVS: S1S2 no m/r/g  Abd: soft NT/ND +BS  Ext: no c/c/e  Neuro: A&Ox3    CENTRAL LINE: [ ] YES [ ] NO  LOCATION:   DATE INSERTED:  REMOVE: [ ] YES [ ] NO      CHAN: [ ] YES [ ] NO    DATE INSERTED:  REMOVE:  [ ] YES [ ] NO      A-LINE:  [ ] YES [ ] NO  LOCATION:   DATE INSERTED:  REMOVE:  [ ] YES [ ] NO  EXPLAIN:    GLOBAL ISSUE/BEST PRACTICE:  Analgesia:  Sedation:  HOB elevation: yes  Stress ulcer prophylaxis:  VTE prophylaxis:  Oral Care:  Glycemic control:  Nutrition:    CODE STATUS: [ ] full code  [ ] DNR  [ ] DNI  [ ] MOLST  Goals of care discussion: [ ] yes HPI:  53M PMH alcohol abuse, hx of alcohol withdrawal, gerd, HLD, presents with tremors, abdominal pain, nausea, and body aches.  Pt stopped drinking day prior to presentation and now feeling agitated and anxious, with sweats similar to prior ETOH withdrawal. Also with nausea, emesis, epigastric pain. Admitted to medical service for alcohol withdrawal, gastritis and esophagitis. Pt on medical floor with worsening withdrawal/DTs, agitated, combative on ativan protocol. Multiple code jessica called for severe agitation. CIWA as high as 40s requiring four point restraints. Transferred to ICU for severe delirium tremens requiring sedation protocol.        24 hr events:  remains afebrile on antibiotics  still with thick secretions suctioned from patient  required prn phenobarbital due to agitation/restlessness  remains on precedex gtt, unable to wean off due to agitation  weaned off levophed, on midodrine    ## ROS:  [ x ] unable to obtain due to sedation and withdrawal      ## Labs:  CBC:                        13.2   5.53  )-----------( 226      ( 01 Jun 2018 04:26 )             41.3     Chem:  06-01    142  |  108  |  6<L>  ----------------------------<  130<H>  3.5   |  24  |  0.85    Ca    8.8      01 Jun 2018 04:26  Phos  1.4     06-01  Mg     2.0     06-01    TPro  7.6  /  Alb  2.7<L>  /  TBili  0.3  /  AST  19  /  ALT  20  /  AlkPhos  49  06-01    Culture - Sputum . (05.30.18 @ 10:09)    Specimen Source: .Sputum Sputum    Culture Results:  Moderate Staphylococcus aureus    Culture - Urine (05.30.18 @ 10:24)    Specimen Source: .Urine Catheterized    Culture Results: No growth    Culture - Blood (05.30.18 @ 09:55)    Specimen Source: .Blood Blood    Culture Results: No growth to date.      ## Medications:  ampicillin/sulbactam  IVPB 3 Gram(s) IV Intermittent every 6 hours      enoxaparin Injectable 40 milliGRAM(s) SubCutaneous daily    polyethylene glycol 3350 17 Gram(s) Oral daily PRN  sucralfate 1 Gram(s) Oral every 6 hours    acetaminophen  Suppository 650 milliGRAM(s) Rectal every 6 hours PRN  chlordiazePOXIDE 100 milliGRAM(s) Oral every 8 hours  dexmedetomidine Infusion 0.5 MICROgram(s)/kG/Hr IV Continuous <Continuous>  midazolam Injectable 2 milliGRAM(s) IV Push every 4 hours PRN  PHENobarbital Injectable 130 milliGRAM(s) IV Push every 30 minutes PRN  PHENobarbital Injectable 260 milliGRAM(s) IV Push every 30 minutes PRN  PHENobarbital Injectable 130 milliGRAM(s) IV Push every 6 hours      ## Vitals:  T(C): 37.6 (06-01-18 @ 16:12), Max: 37.6 (06-01-18 @ 05:38)  HR: 86 (06-01-18 @ 16:00) (61 - 116)  BP: 102/76 (06-01-18 @ 16:00) (96/58 - 172/87)  BP(mean): 83 (06-01-18 @ 16:00) (66 - 107)  RR: 18 (06-01-18 @ 16:00) (14 - 34)  SpO2: 97% (06-01-18 @ 16:00) (91% - 100%)        05-31 @ 07:01  -  06-01 @ 07:00  --------------------------------------------------------  IN: 2257 mL / OUT: 1800 mL / NET: 457 mL    06-01 @ 07:01  -  06-01 @ 17:15  --------------------------------------------------------  IN: 822.2 mL / OUT: 0 mL / NET: 822.2 mL          ## P/E:  Gen: lying comfortably in bed in no apparent distress, encephalopathic  HEENT: PERRL,  NGT in place  Resp: bilateral rhonchi  CVS: S1S2 RRR  Abd: soft NT/ND +BS  Ext: no c/c/e  Neuro: sedated, restless at times, not answering questions    CENTRAL LINE: [ ] YES [x] NO  LOCATION:   DATE INSERTED:  REMOVE: [ ] YES [ ] NO      CHAN: [ ] YES [x] NO    DATE INSERTED:  REMOVE:  [ ] YES [ ] NO      A-LINE:  [ ] YES [x] NO  LOCATION:   DATE INSERTED:  REMOVE:  [ ] YES [ ] NO  EXPLAIN:    GLOBAL ISSUE/BEST PRACTICE:  Analgesia: n/a  Sedation: precedex, phenobarbital  HOB elevation: yes  Stress ulcer prophylaxis: n/a  VTE prophylaxis: lovenox  Oral Care: n/a  Glycemic control: n/a  Nutrition: vital NGT feeds    CODE STATUS: [x] full code  [ ] DNR  [ ] DNI  [ ] MOLST  Goals of care discussion: [ ] yes

## 2018-06-02 LAB
-  AMPICILLIN/SULBACTAM: SIGNIFICANT CHANGE UP
-  CEFAZOLIN: SIGNIFICANT CHANGE UP
-  CIPROFLOXACIN: SIGNIFICANT CHANGE UP
-  CLINDAMYCIN: SIGNIFICANT CHANGE UP
-  ERYTHROMYCIN: SIGNIFICANT CHANGE UP
-  GENTAMICIN: SIGNIFICANT CHANGE UP
-  LEVOFLOXACIN: SIGNIFICANT CHANGE UP
-  MOXIFLOXACIN(AEROBIC): SIGNIFICANT CHANGE UP
-  OXACILLIN: SIGNIFICANT CHANGE UP
-  PENICILLIN: SIGNIFICANT CHANGE UP
-  RIFAMPIN: SIGNIFICANT CHANGE UP
-  TETRACYCLINE: SIGNIFICANT CHANGE UP
-  TRIMETHOPRIM/SULFAMETHOXAZOLE: SIGNIFICANT CHANGE UP
-  VANCOMYCIN: SIGNIFICANT CHANGE UP
ALBUMIN SERPL ELPH-MCNC: 2.5 G/DL — LOW (ref 3.3–5)
ALP SERPL-CCNC: 38 U/L — LOW (ref 40–120)
ALT FLD-CCNC: 19 U/L — SIGNIFICANT CHANGE UP (ref 12–78)
ANION GAP SERPL CALC-SCNC: 10 MMOL/L — SIGNIFICANT CHANGE UP (ref 5–17)
AST SERPL-CCNC: 16 U/L — SIGNIFICANT CHANGE UP (ref 15–37)
BASOPHILS # BLD AUTO: 0 K/UL — SIGNIFICANT CHANGE UP (ref 0–0.2)
BASOPHILS NFR BLD AUTO: 0 % — SIGNIFICANT CHANGE UP (ref 0–2)
BILIRUB SERPL-MCNC: 0.2 MG/DL — SIGNIFICANT CHANGE UP (ref 0.2–1.2)
BUN SERPL-MCNC: 8 MG/DL — SIGNIFICANT CHANGE UP (ref 7–23)
CALCIUM SERPL-MCNC: 8.5 MG/DL — SIGNIFICANT CHANGE UP (ref 8.5–10.1)
CHLORIDE SERPL-SCNC: 110 MMOL/L — HIGH (ref 96–108)
CO2 SERPL-SCNC: 25 MMOL/L — SIGNIFICANT CHANGE UP (ref 22–31)
CREAT SERPL-MCNC: 0.67 MG/DL — SIGNIFICANT CHANGE UP (ref 0.5–1.3)
CULTURE RESULTS: SIGNIFICANT CHANGE UP
EOSINOPHIL # BLD AUTO: 0 K/UL — SIGNIFICANT CHANGE UP (ref 0–0.5)
EOSINOPHIL NFR BLD AUTO: 0 % — SIGNIFICANT CHANGE UP (ref 0–6)
GLUCOSE SERPL-MCNC: 108 MG/DL — HIGH (ref 70–99)
GRAM STN FLD: SIGNIFICANT CHANGE UP
HCT VFR BLD CALC: 36.6 % — LOW (ref 39–50)
HGB BLD-MCNC: 12 G/DL — LOW (ref 13–17)
LYMPHOCYTES # BLD AUTO: 1.2 K/UL — SIGNIFICANT CHANGE UP (ref 1–3.3)
LYMPHOCYTES # BLD AUTO: 36 % — SIGNIFICANT CHANGE UP (ref 13–44)
MAGNESIUM SERPL-MCNC: 2.2 MG/DL — SIGNIFICANT CHANGE UP (ref 1.6–2.6)
MANUAL SMEAR VERIFICATION: SIGNIFICANT CHANGE UP
MCHC RBC-ENTMCNC: 28.4 PG — SIGNIFICANT CHANGE UP (ref 27–34)
MCHC RBC-ENTMCNC: 32.8 GM/DL — SIGNIFICANT CHANGE UP (ref 32–36)
MCV RBC AUTO: 86.5 FL — SIGNIFICANT CHANGE UP (ref 80–100)
METHOD TYPE: SIGNIFICANT CHANGE UP
MONOCYTES # BLD AUTO: 0.73 K/UL — SIGNIFICANT CHANGE UP (ref 0–0.9)
MONOCYTES NFR BLD AUTO: 22 % — HIGH (ref 2–14)
NEUTROPHILS # BLD AUTO: 1.4 K/UL — LOW (ref 1.8–7.4)
NEUTROPHILS NFR BLD AUTO: 42 % — LOW (ref 43–77)
NRBC # BLD: 0 /100 — SIGNIFICANT CHANGE UP (ref 0–0)
NRBC # BLD: SIGNIFICANT CHANGE UP /100 WBCS (ref 0–0)
ORGANISM # SPEC MICROSCOPIC CNT: SIGNIFICANT CHANGE UP
ORGANISM # SPEC MICROSCOPIC CNT: SIGNIFICANT CHANGE UP
PHOSPHATE SERPL-MCNC: 3.7 MG/DL — SIGNIFICANT CHANGE UP (ref 2.5–4.5)
PLAT MORPH BLD: NORMAL — SIGNIFICANT CHANGE UP
PLATELET # BLD AUTO: 264 K/UL — SIGNIFICANT CHANGE UP (ref 150–400)
POTASSIUM SERPL-MCNC: 3.2 MMOL/L — LOW (ref 3.5–5.3)
POTASSIUM SERPL-SCNC: 3.2 MMOL/L — LOW (ref 3.5–5.3)
PROT SERPL-MCNC: 7.1 GM/DL — SIGNIFICANT CHANGE UP (ref 6–8.3)
RBC # BLD: 4.23 M/UL — SIGNIFICANT CHANGE UP (ref 4.2–5.8)
RBC # FLD: 13.2 % — SIGNIFICANT CHANGE UP (ref 10.3–14.5)
RBC BLD AUTO: NORMAL — SIGNIFICANT CHANGE UP
SODIUM SERPL-SCNC: 145 MMOL/L — SIGNIFICANT CHANGE UP (ref 135–145)
SPECIMEN SOURCE: SIGNIFICANT CHANGE UP
WBC # BLD: 3.33 K/UL — LOW (ref 3.8–10.5)
WBC # FLD AUTO: 3.33 K/UL — LOW (ref 3.8–10.5)

## 2018-06-02 PROCEDURE — 71045 X-RAY EXAM CHEST 1 VIEW: CPT | Mod: 26,59

## 2018-06-02 PROCEDURE — 99291 CRITICAL CARE FIRST HOUR: CPT

## 2018-06-02 RX ORDER — POTASSIUM CHLORIDE 20 MEQ
10 PACKET (EA) ORAL
Qty: 0 | Refills: 0 | Status: COMPLETED | OUTPATIENT
Start: 2018-06-02 | End: 2018-06-02

## 2018-06-02 RX ORDER — POTASSIUM CHLORIDE 20 MEQ
40 PACKET (EA) ORAL ONCE
Qty: 0 | Refills: 0 | Status: COMPLETED | OUTPATIENT
Start: 2018-06-02 | End: 2018-06-02

## 2018-06-02 RX ORDER — HALOPERIDOL DECANOATE 100 MG/ML
5 INJECTION INTRAMUSCULAR ONCE
Qty: 0 | Refills: 0 | Status: COMPLETED | OUTPATIENT
Start: 2018-06-02 | End: 2018-06-02

## 2018-06-02 RX ADMIN — Medication 1 GRAM(S): at 17:43

## 2018-06-02 RX ADMIN — Medication 1 TABLET(S): at 13:17

## 2018-06-02 RX ADMIN — Medication 100 MILLIEQUIVALENT(S): at 07:12

## 2018-06-02 RX ADMIN — Medication 130 MILLIGRAM(S): at 21:41

## 2018-06-02 RX ADMIN — Medication 100 MILLIGRAM(S): at 21:41

## 2018-06-02 RX ADMIN — PANTOPRAZOLE SODIUM 40 MILLIGRAM(S): 20 TABLET, DELAYED RELEASE ORAL at 07:19

## 2018-06-02 RX ADMIN — Medication 100 MILLIGRAM(S): at 13:20

## 2018-06-02 RX ADMIN — Medication 100 MILLIEQUIVALENT(S): at 07:02

## 2018-06-02 RX ADMIN — Medication 130 MILLIGRAM(S): at 10:05

## 2018-06-02 RX ADMIN — Medication 650 MILLIGRAM(S): at 18:17

## 2018-06-02 RX ADMIN — Medication 40 MILLIEQUIVALENT(S): at 07:01

## 2018-06-02 RX ADMIN — Medication 1 GRAM(S): at 07:17

## 2018-06-02 RX ADMIN — Medication 1 GRAM(S): at 00:19

## 2018-06-02 RX ADMIN — Medication 1 GRAM(S): at 13:18

## 2018-06-02 RX ADMIN — AMPICILLIN SODIUM AND SULBACTAM SODIUM 200 GRAM(S): 250; 125 INJECTION, POWDER, FOR SUSPENSION INTRAMUSCULAR; INTRAVENOUS at 13:15

## 2018-06-02 RX ADMIN — AMPICILLIN SODIUM AND SULBACTAM SODIUM 200 GRAM(S): 250; 125 INJECTION, POWDER, FOR SUSPENSION INTRAMUSCULAR; INTRAVENOUS at 07:00

## 2018-06-02 RX ADMIN — Medication 100 MILLIGRAM(S): at 13:30

## 2018-06-02 RX ADMIN — Medication 100 MILLIEQUIVALENT(S): at 08:49

## 2018-06-02 RX ADMIN — Medication 100 MILLIGRAM(S): at 00:15

## 2018-06-02 RX ADMIN — ENOXAPARIN SODIUM 40 MILLIGRAM(S): 100 INJECTION SUBCUTANEOUS at 13:16

## 2018-06-02 RX ADMIN — Medication 130 MILLIGRAM(S): at 00:15

## 2018-06-02 RX ADMIN — DEXMEDETOMIDINE HYDROCHLORIDE IN 0.9% SODIUM CHLORIDE 9.64 MICROGRAM(S)/KG/HR: 4 INJECTION INTRAVENOUS at 08:58

## 2018-06-02 RX ADMIN — Medication 1 MILLIGRAM(S): at 13:17

## 2018-06-02 RX ADMIN — AMPICILLIN SODIUM AND SULBACTAM SODIUM 200 GRAM(S): 250; 125 INJECTION, POWDER, FOR SUSPENSION INTRAMUSCULAR; INTRAVENOUS at 00:15

## 2018-06-02 RX ADMIN — CHLORHEXIDINE GLUCONATE 1 APPLICATION(S): 213 SOLUTION TOPICAL at 06:00

## 2018-06-02 RX ADMIN — HALOPERIDOL DECANOATE 5 MILLIGRAM(S): 100 INJECTION INTRAMUSCULAR at 12:00

## 2018-06-02 RX ADMIN — AMPICILLIN SODIUM AND SULBACTAM SODIUM 200 GRAM(S): 250; 125 INJECTION, POWDER, FOR SUSPENSION INTRAMUSCULAR; INTRAVENOUS at 17:43

## 2018-06-02 NOTE — PROGRESS NOTE ADULT - ASSESSMENT
53M PMH alcohol abuse, hx of alcohol withdrawal, gerd, HLD, presents with alcohol withdrawal, delirium tremens, alcoholic gastritis/esophagitis. Course with fever and hypotension/shock due to PNA.     - weaned off precedex drip this morning  - remains on standing phenobarbital and librium  - prn versed and phenobarbital for breakthrough agitation  - MVI/thiamine/folate for alcohol abuse  - protonix and carafate for alcohol esophagitis and gastritis  - cont 1:1 observation  - supplement hypokalemia  - sputum culture growing staph aureus, follow up sensitivities  - cont unasyn (day #4) to cover for PNA which should cover staph as well  - BP stable off levo, off midodrine, only required very brief period of pressors (less than a day)    Critical Care Time: 35 min

## 2018-06-02 NOTE — PROGRESS NOTE ADULT - SUBJECTIVE AND OBJECTIVE BOX
HPI:  53M PMH alcohol abuse, hx of alcohol withdrawal, gerd, HLD, presents with tremors, abdominal pain, nausea, and body aches.  Pt stopped drinking day prior to presentation and now feeling agitated and anxious, with sweats similar to prior ETOH withdrawal. Also with nausea, emesis, epigastric pain. Admitted to medical service for alcohol withdrawal, gastritis and esophagitis. Pt on medical floor with worsening withdrawal/DTs, agitated, combative on ativan protocol. Multiple code jessica called for severe agitation. CIWA as high as 40s requiring four point restraints. Transferred to ICU for severe delirium tremens requiring sedation protocol.      24 hr events:  pt has pulled out NGT several times requiring re-insertion  remains encephalopathic and delirious  weaned off precedex drip this morning   s/p 1 dose of haldol for agitation off precedex this afternoon   remains on librium and phenobarbital       ## ROS:  [x] unable to obtain due to encephalopathy and delirium       ## Labs:  CBC:                        12.0   3.33  )-----------( 264      ( 2018 04:49 )             36.6     Chem:  06-    145        |  110<H>  |  8   ----------------------------------<  108<H>  3.2<L>   |  25          |  0.67    Ca    8.5      2018 04:49  Phos  3.7     -  Mg     2.2     -    TPro  7.1  /  Alb  2.5<L>  /  TBili  0.2  /  AST  16  /  ALT  19  /  AlkPhos  38<L>      Culture - Urine (18 @ 10:24)    Specimen Source: .Urine Catheterized    Culture Results: No growth    Culture - Sputum . (18 @ 10:09)     Specimen Source: .Sputum Sputum    Culture Results: Moderate Staphylococcus aureus    Culture - Blood (18 @ 09:55)    Specimen Source: .Blood Blood    Culture Results: No growth to date.      ## Imaging:    ## Medications:  ampicillin/sulbactam  IVPB 3 Gram(s) IV Intermittent every 6 hours          enoxaparin Injectable 40 milliGRAM(s) SubCutaneous daily    pantoprazole   Suspension 40 milliGRAM(s) Oral before breakfast  polyethylene glycol 3350 17 Gram(s) Oral daily PRN  sucralfate 1 Gram(s) Oral every 6 hours    acetaminophen  Suppository 650 milliGRAM(s) Rectal every 6 hours PRN  chlordiazePOXIDE 100 milliGRAM(s) Oral every 8 hours  dexmedetomidine Infusion 0.5 MICROgram(s)/kG/Hr IV Continuous <Continuous>  midazolam Injectable 2 milliGRAM(s) IV Push every 4 hours PRN  PHENobarbital Injectable 130 milliGRAM(s) IV Push every 30 minutes PRN  PHENobarbital Injectable 260 milliGRAM(s) IV Push every 30 minutes PRN  PHENobarbital Injectable 130 milliGRAM(s) IV Push every 6 hours      ## Vitals:  T(C): 37.6 (18 @ 11:04), Max: 37.6 (18 @ 16:12)  HR: 94 (18 @ 14:09) (66 - 120)  BP: 118/76 (18 @ 14:00) (89/57 - 163/97)  BP(mean): 85 (18 @ 14:00) (58 - 114)  RR: 16 (18 @ 14:09) (14 - 30)  SpO2: 97% (18 @ 14:09) (91% - 100%)  Wt(kg): --  Vent:   AB-01 @ 07:01  -   @ 07:00  --------------------------------------------------------  IN: 2182.4 mL / OUT: 300 mL / NET: 1882.4 mL     @ 07:01  -   @ 15:19  --------------------------------------------------------  IN: 461.6 mL / OUT: 1000 mL / NET: -538.4 mL          ## P/E:  Gen: lying comfortably in bed in no apparent distress  HEENT: PERRL, EOMI  Resp: CTA B/L no c/r/w  CVS: S1S2 no m/r/g  Abd: soft NT/ND +BS  Ext: no c/c/e  Neuro: A&Ox3    CENTRAL LINE: [ ] YES [ ] NO  LOCATION:   DATE INSERTED:  REMOVE: [ ] YES [ ] NO      CHAN: [ ] YES [ ] NO    DATE INSERTED:  REMOVE:  [ ] YES [ ] NO      A-LINE:  [ ] YES [ ] NO  LOCATION:   DATE INSERTED:  REMOVE:  [ ] YES [ ] NO  EXPLAIN:    GLOBAL ISSUE/BEST PRACTICE:  Analgesia:  Sedation:  HOB elevation: yes  Stress ulcer prophylaxis:  VTE prophylaxis:  Oral Care:  Glycemic control:  Nutrition:    CODE STATUS: [ ] full code  [ ] DNR  [ ] DNI  [ ] MOLST  Goals of care discussion: [ ] yes HPI:  53M PMH alcohol abuse, hx of alcohol withdrawal, gerd, HLD, presents with tremors, abdominal pain, nausea, and body aches.  Pt stopped drinking day prior to presentation and now feeling agitated and anxious, with sweats similar to prior ETOH withdrawal. Also with nausea, emesis, epigastric pain. Admitted to medical service for alcohol withdrawal, gastritis and esophagitis. Pt on medical floor with worsening withdrawal/DTs, agitated, combative on ativan protocol. Multiple code jessica called for severe agitation. CIWA as high as 40s requiring four point restraints. Transferred to ICU for severe delirium tremens requiring sedation protocol.      24 hr events:  pt has pulled out NGT several times requiring re-insertion  remains encephalopathic and delirious  weaned off precedex drip this morning   s/p 1 dose of haldol for agitation off precedex this afternoon   remains on librium and phenobarbital   secretions requiring suctioning orally and nasotracheally       ## ROS:  [x] unable to obtain due to encephalopathy and delirium       ## Labs:  CBC:                        12.0   3.33  )-----------( 264      ( 02 Jun 2018 04:49 )             36.6     Chem:  06-02    145        |  110<H>  |  8   ----------------------------------<  108<H>  3.2<L>   |  25          |  0.67    Ca    8.5      02 Jun 2018 04:49  Phos  3.7     06-02  Mg     2.2     06-02    TPro  7.1  /  Alb  2.5<L>  /  TBili  0.2  /  AST  16  /  ALT  19  /  AlkPhos  38<L>  06-02    Culture - Urine (05.30.18 @ 10:24)    Specimen Source: .Urine Catheterized    Culture Results: No growth    Culture - Sputum . (05.30.18 @ 10:09)     Specimen Source: .Sputum Sputum    Culture Results: Moderate Staphylococcus aureus    Culture - Blood (05.30.18 @ 09:55)    Specimen Source: .Blood Blood    Culture Results: No growth to date.      ## Imaging:    ## Medications:  ampicillin/sulbactam  IVPB 3 Gram(s) IV Intermittent every 6 hours      enoxaparin Injectable 40 milliGRAM(s) SubCutaneous daily    pantoprazole   Suspension 40 milliGRAM(s) Oral before breakfast  polyethylene glycol 3350 17 Gram(s) Oral daily PRN  sucralfate 1 Gram(s) Oral every 6 hours    acetaminophen  Suppository 650 milliGRAM(s) Rectal every 6 hours PRN  chlordiazePOXIDE 100 milliGRAM(s) Oral every 8 hours  dexmedetomidine Infusion 0.5 MICROgram(s)/kG/Hr IV Continuous <Continuous>  midazolam Injectable 2 milliGRAM(s) IV Push every 4 hours PRN  PHENobarbital Injectable 130 milliGRAM(s) IV Push every 30 minutes PRN  PHENobarbital Injectable 260 milliGRAM(s) IV Push every 30 minutes PRN  PHENobarbital Injectable 130 milliGRAM(s) IV Push every 6 hours      ## Vitals:  T(C): 37.6 (06-02-18 @ 11:04), Max: 37.6 (06-01-18 @ 16:12)  HR: 94 (06-02-18 @ 14:09) (66 - 120)  BP: 118/76 (06-02-18 @ 14:00) (89/57 - 163/97)  BP(mean): 85 (06-02-18 @ 14:00) (58 - 114)  RR: 16 (06-02-18 @ 14:09) (14 - 30)  SpO2: 97% (06-02-18 @ 14:09) (91% - 100%)        06-01 @ 07:01  -  06-02 @ 07:00  --------------------------------------------------------  IN: 2182.4 mL / OUT: 300 mL / NET: 1882.4 mL    06-02 @ 07:01  -  06-02 @ 15:19  --------------------------------------------------------  IN: 461.6 mL / OUT: 1000 mL / NET: -538.4 mL          ## P/E:  Gen: in bed mumbling incomprehensible words, not following commands  HEENT: PERRL, NGT in place  Resp: scattered rhonchi  CVS: S1S2 RRR  Abd: soft NT/ND +BS   Ext: no c/c/e  Neuro: arousable, not following commands, disoriented, episodes of restlessness and agitation    CENTRAL LINE: [ ] YES [x] NO  LOCATION:   DATE INSERTED:  REMOVE: [ ] YES [ ] NO      CHAN: [ ] YES [x] NO    DATE INSERTED:  REMOVE:  [ ] YES [ ] NO      A-LINE:  [ ] YES [x] NO  LOCATION:   DATE INSERTED:  REMOVE:  [ ] YES [ ] NO  EXPLAIN:    GLOBAL ISSUE/BEST PRACTICE:  Analgesia: n/a   Sedation: librium, phenobarbital   HOB elevation: yes  Stress ulcer prophylaxis: protonix  VTE prophylaxis: lovenox  Oral Care: n/a   Glycemic control: n/a  Nutrition: vital AF tube feeds     CODE STATUS: [x] full code  [ ] DNR  [ ] DNI  [ ] MOLST  Goals of care discussion: [ ] yes

## 2018-06-03 LAB
ALBUMIN SERPL ELPH-MCNC: 2.8 G/DL — LOW (ref 3.3–5)
ALP SERPL-CCNC: 44 U/L — SIGNIFICANT CHANGE UP (ref 40–120)
ALT FLD-CCNC: 23 U/L — SIGNIFICANT CHANGE UP (ref 12–78)
ANION GAP SERPL CALC-SCNC: 11 MMOL/L — SIGNIFICANT CHANGE UP (ref 5–17)
AST SERPL-CCNC: 22 U/L — SIGNIFICANT CHANGE UP (ref 15–37)
BASOPHILS # BLD AUTO: 0.04 K/UL — SIGNIFICANT CHANGE UP (ref 0–0.2)
BASOPHILS NFR BLD AUTO: 0.8 % — SIGNIFICANT CHANGE UP (ref 0–2)
BILIRUB SERPL-MCNC: 0.2 MG/DL — SIGNIFICANT CHANGE UP (ref 0.2–1.2)
BUN SERPL-MCNC: 8 MG/DL — SIGNIFICANT CHANGE UP (ref 7–23)
CALCIUM SERPL-MCNC: 9 MG/DL — SIGNIFICANT CHANGE UP (ref 8.5–10.1)
CHLORIDE SERPL-SCNC: 112 MMOL/L — HIGH (ref 96–108)
CO2 SERPL-SCNC: 25 MMOL/L — SIGNIFICANT CHANGE UP (ref 22–31)
CREAT SERPL-MCNC: 0.76 MG/DL — SIGNIFICANT CHANGE UP (ref 0.5–1.3)
EOSINOPHIL # BLD AUTO: 0.1 K/UL — SIGNIFICANT CHANGE UP (ref 0–0.5)
EOSINOPHIL NFR BLD AUTO: 2 % — SIGNIFICANT CHANGE UP (ref 0–6)
GLUCOSE SERPL-MCNC: 91 MG/DL — SIGNIFICANT CHANGE UP (ref 70–99)
HCT VFR BLD CALC: 44.7 % — SIGNIFICANT CHANGE UP (ref 39–50)
HGB BLD-MCNC: 14.3 G/DL — SIGNIFICANT CHANGE UP (ref 13–17)
IMM GRANULOCYTES NFR BLD AUTO: 1.2 % — SIGNIFICANT CHANGE UP (ref 0–1.5)
LYMPHOCYTES # BLD AUTO: 1.17 K/UL — SIGNIFICANT CHANGE UP (ref 1–3.3)
LYMPHOCYTES # BLD AUTO: 23.9 % — SIGNIFICANT CHANGE UP (ref 13–44)
MAGNESIUM SERPL-MCNC: 2.3 MG/DL — SIGNIFICANT CHANGE UP (ref 1.6–2.6)
MCHC RBC-ENTMCNC: 27.7 PG — SIGNIFICANT CHANGE UP (ref 27–34)
MCHC RBC-ENTMCNC: 32 GM/DL — SIGNIFICANT CHANGE UP (ref 32–36)
MCV RBC AUTO: 86.6 FL — SIGNIFICANT CHANGE UP (ref 80–100)
MONOCYTES # BLD AUTO: 0.65 K/UL — SIGNIFICANT CHANGE UP (ref 0–0.9)
MONOCYTES NFR BLD AUTO: 13.3 % — SIGNIFICANT CHANGE UP (ref 2–14)
NEUTROPHILS # BLD AUTO: 2.87 K/UL — SIGNIFICANT CHANGE UP (ref 1.8–7.4)
NEUTROPHILS NFR BLD AUTO: 58.8 % — SIGNIFICANT CHANGE UP (ref 43–77)
NRBC # BLD: 0 /100 WBCS — SIGNIFICANT CHANGE UP (ref 0–0)
PHOSPHATE SERPL-MCNC: 2.5 MG/DL — SIGNIFICANT CHANGE UP (ref 2.5–4.5)
PLATELET # BLD AUTO: 323 K/UL — SIGNIFICANT CHANGE UP (ref 150–400)
POTASSIUM SERPL-MCNC: 4.1 MMOL/L — SIGNIFICANT CHANGE UP (ref 3.5–5.3)
POTASSIUM SERPL-SCNC: 4.1 MMOL/L — SIGNIFICANT CHANGE UP (ref 3.5–5.3)
PROT SERPL-MCNC: 8.2 GM/DL — SIGNIFICANT CHANGE UP (ref 6–8.3)
RBC # BLD: 5.16 M/UL — SIGNIFICANT CHANGE UP (ref 4.2–5.8)
RBC # FLD: 13.2 % — SIGNIFICANT CHANGE UP (ref 10.3–14.5)
SODIUM SERPL-SCNC: 148 MMOL/L — HIGH (ref 135–145)
WBC # BLD: 4.89 K/UL — SIGNIFICANT CHANGE UP (ref 3.8–10.5)
WBC # FLD AUTO: 4.89 K/UL — SIGNIFICANT CHANGE UP (ref 3.8–10.5)

## 2018-06-03 PROCEDURE — 71045 X-RAY EXAM CHEST 1 VIEW: CPT | Mod: 26,59

## 2018-06-03 PROCEDURE — 99291 CRITICAL CARE FIRST HOUR: CPT

## 2018-06-03 RX ORDER — SUCRALFATE 1 G
1 TABLET ORAL EVERY 6 HOURS
Qty: 0 | Refills: 0 | Status: DISCONTINUED | OUTPATIENT
Start: 2018-06-03 | End: 2018-06-11

## 2018-06-03 RX ORDER — PHENOBARBITAL 60 MG
130 TABLET ORAL
Qty: 0 | Refills: 0 | Status: DISCONTINUED | OUTPATIENT
Start: 2018-06-03 | End: 2018-06-06

## 2018-06-03 RX ORDER — PHENOBARBITAL 60 MG
260 TABLET ORAL
Qty: 0 | Refills: 0 | Status: DISCONTINUED | OUTPATIENT
Start: 2018-06-03 | End: 2018-06-06

## 2018-06-03 RX ADMIN — Medication 100 MILLIGRAM(S): at 05:51

## 2018-06-03 RX ADMIN — PANTOPRAZOLE SODIUM 40 MILLIGRAM(S): 20 TABLET, DELAYED RELEASE ORAL at 12:19

## 2018-06-03 RX ADMIN — Medication 1 GRAM(S): at 00:12

## 2018-06-03 RX ADMIN — Medication 1 GRAM(S): at 05:51

## 2018-06-03 RX ADMIN — Medication 1 GRAM(S): at 17:37

## 2018-06-03 RX ADMIN — Medication 100 MILLIGRAM(S): at 14:03

## 2018-06-03 RX ADMIN — Medication 100 MILLIGRAM(S): at 12:20

## 2018-06-03 RX ADMIN — AMPICILLIN SODIUM AND SULBACTAM SODIUM 200 GRAM(S): 250; 125 INJECTION, POWDER, FOR SUSPENSION INTRAMUSCULAR; INTRAVENOUS at 05:50

## 2018-06-03 RX ADMIN — AMPICILLIN SODIUM AND SULBACTAM SODIUM 200 GRAM(S): 250; 125 INJECTION, POWDER, FOR SUSPENSION INTRAMUSCULAR; INTRAVENOUS at 17:38

## 2018-06-03 RX ADMIN — Medication 1 GRAM(S): at 12:19

## 2018-06-03 RX ADMIN — Medication 1 TABLET(S): at 12:19

## 2018-06-03 RX ADMIN — AMPICILLIN SODIUM AND SULBACTAM SODIUM 200 GRAM(S): 250; 125 INJECTION, POWDER, FOR SUSPENSION INTRAMUSCULAR; INTRAVENOUS at 12:28

## 2018-06-03 RX ADMIN — CHLORHEXIDINE GLUCONATE 1 APPLICATION(S): 213 SOLUTION TOPICAL at 05:51

## 2018-06-03 RX ADMIN — Medication 100 MILLIGRAM(S): at 22:22

## 2018-06-03 RX ADMIN — AMPICILLIN SODIUM AND SULBACTAM SODIUM 200 GRAM(S): 250; 125 INJECTION, POWDER, FOR SUSPENSION INTRAMUSCULAR; INTRAVENOUS at 00:12

## 2018-06-03 RX ADMIN — ENOXAPARIN SODIUM 40 MILLIGRAM(S): 100 INJECTION SUBCUTANEOUS at 12:19

## 2018-06-03 RX ADMIN — Medication 1 MILLIGRAM(S): at 12:19

## 2018-06-03 NOTE — PROGRESS NOTE ADULT - ASSESSMENT
53M PMH alcohol abuse, hx of alcohol withdrawal, gerd, HLD, presents with alcohol withdrawal, delirium tremens, alcoholic gastritis/esophagitis. Course with fever and hypotension/shock due to PNA.     - weaned off precedex drip x24 hours  - now off standing phenobarbital, doing well on standing librium without significant agitation  - if lethargic can start to decrease and taper librium  - prn versed and phenobarbital for breakthrough agitation  - MVI/thiamine/folate for alcohol abuse  - protonix and carafate for alcohol esophagitis and gastritis  - cont 1:1 observation  - hypernatremia: start free water boluses  - monitor electrolytes and supplement as needed  - sputum culture growing staph aureus (fluoroquinolone resistant) cont unasyn (day #5) to cover for PNA   - BP stable off levo, off midodrine, pt only required very brief period of pressors (less than a day)  - monitor fever curve, likely had another aspiration event with spike in fever  - check CXR  - will monitor in ICU due to secretions and suctioning needs    Critical Care Time: 35 min

## 2018-06-03 NOTE — PROGRESS NOTE ADULT - SUBJECTIVE AND OBJECTIVE BOX
HPI:  53 year old man with PMH gerd, HLD, etoh abuse presents with complaint of tremors, abdominal pain, nausea, and body aches.  He states he stopped drinking yesterday and this feels typical of his withdrawals.  He feels agitated and anxious, has been sweating.    In the ED, work up consistent with gastritis with esophagitis, CIWA 10.  Lactate 6.1-->3.2. (25 May 2018 03:23)      24 hr events:      ## ROS:  [ ] unable to obtain  CONSTITUTIONAL: No fever, weight loss, or fatigue  EYES: No eye pain, visual disturbances, or discharge  ENMT:  No difficulty hearing, tinnitus, vertigo; No sinus or throat pain  NECK: No pain or stiffness  RESPIRATORY: No cough, wheezing, chills or hemoptysis; No shortness of breath  CARDIOVASCULAR: No chest pain, palpitations, dizziness, or leg swelling  GASTROINTESTINAL: No abdominal or epigastric pain. No nausea, vomiting, or hematemesis; No diarrhea or constipation. No melena or hematochezia.  GENITOURINARY: No dysuria, frequency, hematuria, or incontinence  NEUROLOGICAL: No headaches, memory loss, loss of strength, numbness, or tremors  SKIN: No itching, burning, rashes, or lesions   LYMPH NODES: No enlarged glands  ENDOCRINE: No heat or cold intolerance; No hair loss  MUSCULOSKELETAL: No joint pain or swelling; No muscle, back, or extremity pain  PSYCHIATRIC: No depression, anxiety, mood swings, or difficulty sleeping  HEME/LYMPH: No easy bruising, or bleeding gums  ALLERGY AND IMMUNOLOGIC: No hives or eczema    ## Labs:  CBC:                        14.3   4.89  )-----------( 323      ( 2018 06:11 )             44.7     Chem:  06-    148<H>  |  112<H>  |  8   ----------------------------<  91  4.1   |  25  |  0.76    Ca    9.0      2018 06:11  Phos  2.5     06-  Mg     2.3     -    TPro  8.2  /  Alb  2.8<L>  /  TBili  0.2  /  DBili  x   /  AST  22  /  ALT  23  /  AlkPhos  44  -    Coags:          ## Imaging:    ## Medications:  ampicillin/sulbactam  IVPB 3 Gram(s) IV Intermittent every 6 hours          enoxaparin Injectable 40 milliGRAM(s) SubCutaneous daily    pantoprazole   Suspension 40 milliGRAM(s) Oral before breakfast  polyethylene glycol 3350 17 Gram(s) Oral daily PRN  sucralfate suspension 1 Gram(s) Oral every 6 hours    acetaminophen  Suppository 650 milliGRAM(s) Rectal every 6 hours PRN  chlordiazePOXIDE 100 milliGRAM(s) Oral every 8 hours  midazolam Injectable 2 milliGRAM(s) IV Push every 4 hours PRN  PHENobarbital Injectable 130 milliGRAM(s) IV Push every 30 minutes PRN  PHENobarbital Injectable 260 milliGRAM(s) IV Push every 30 minutes PRN      ## Vitals:  T(C): 37.1 (18 @ 19:45), Max: 37.8 (18 @ 16:29)  HR: 105 (18 @ 19:26) (99 - 127)  BP: 120/71 (18 @ 19:00) (113/68 - 145/92)  BP(mean): 77 (18 @ 19:00) (73 - 103)  RR: 20 (18 @ 19:26) (0 - 36)  SpO2: 97% (18 @ 19:26) (93% - 100%)  Wt(kg): --  Vent:   AB-02 @ 07:01  -   @ 07:00  --------------------------------------------------------  IN: 911.6 mL / OUT: 1000 mL / NET: -88.4 mL     @ 07:01  -   @ 21:14  --------------------------------------------------------  IN: 1000 mL / OUT: 200 mL / NET: 800 mL          ## P/E:  Gen: lying comfortably in bed in no apparent distress  HEENT: PERRL, EOMI  Resp: CTA B/L no c/r/w  CVS: S1S2 no m/r/g  Abd: soft NT/ND +BS  Ext: no c/c/e  Neuro: A&Ox3    CENTRAL LINE: [ ] YES [ ] NO  LOCATION:   DATE INSERTED:  REMOVE: [ ] YES [ ] NO      CHAN: [ ] YES [ ] NO    DATE INSERTED:  REMOVE:  [ ] YES [ ] NO      A-LINE:  [ ] YES [ ] NO  LOCATION:   DATE INSERTED:  REMOVE:  [ ] YES [ ] NO  EXPLAIN:    GLOBAL ISSUE/BEST PRACTICE:  Analgesia:  Sedation:  HOB elevation: yes  Stress ulcer prophylaxis:  VTE prophylaxis:  Oral Care:  Glycemic control:  Nutrition:    CODE STATUS: [ ] full code  [ ] DNR  [ ] DNI  [ ] MOLST  Goals of care discussion: [ ] yes HPI:  53M PMH alcohol abuse, hx of alcohol withdrawal, gerd, HLD, presents with tremors, abdominal pain, nausea, and body aches.  Pt stopped drinking day prior to presentation and now feeling agitated and anxious, with sweats similar to prior ETOH withdrawal. Also with nausea, emesis, epigastric pain. Admitted to medical service for alcohol withdrawal, gastritis and esophagitis. Pt on medical floor with worsening withdrawal/DTs, agitated, combative on ativan protocol. Multiple code jessica called for severe agitation. CIWA as high as 40s requiring four point restraints. Transferred to ICU for severe delirium tremens requiring sedation protocol.      24 hr events:  weaned off precedex since yesterday  now off standing phenobarbital  remains on librium standing  spiked fever  copious oral secretions requiring frequent suctioning  desaturating on nasal cannula this morning to the low 90s, improved after nasotracheal suctioning  remains delirious and encephalopathic, not following commands      ## ROS:  [x] unable to obtain due to encephalopathy, delirium       ## Labs:  CBC:                        14.3   4.89  )-----------( 323      ( 03 Jun 2018 06:11 )             44.7     Chem:  06-03    148<H>  |  112<H>  |  8   ----------------------------<  91  4.1   |  25  |  0.76    Ca    9.0      03 Jun 2018 06:11  Phos  2.5     06-03  Mg     2.3     06-03    TPro  8.2  /  Alb  2.8<L>  /  TBili  0.2  /  AST  22  /  ALT  23  /  AlkPhos  44  06-03    ## Medications:  ampicillin/sulbactam  IVPB 3 Gram(s) IV Intermittent every 6 hours    enoxaparin Injectable 40 milliGRAM(s) SubCutaneous daily    pantoprazole   Suspension 40 milliGRAM(s) Oral before breakfast  polyethylene glycol 3350 17 Gram(s) Oral daily PRN  sucralfate suspension 1 Gram(s) Oral every 6 hours    acetaminophen  Suppository 650 milliGRAM(s) Rectal every 6 hours PRN  chlordiazePOXIDE 100 milliGRAM(s) Oral every 8 hours  midazolam Injectable 2 milliGRAM(s) IV Push every 4 hours PRN  PHENobarbital Injectable 130 milliGRAM(s) IV Push every 30 minutes PRN  PHENobarbital Injectable 260 milliGRAM(s) IV Push every 30 minutes PRN      ## Vitals:  T(C): 37.1 (06-03-18 @ 19:45), Max: 37.8 (06-03-18 @ 16:29)  HR: 105 (06-03-18 @ 19:26) (99 - 127)  BP: 120/71 (06-03-18 @ 19:00) (113/68 - 145/92)  BP(mean): 77 (06-03-18 @ 19:00) (73 - 103)  RR: 20 (06-03-18 @ 19:26) (0 - 36)  SpO2: 97% (06-03-18 @ 19:26) (93% - 100%)      06-02 @ 07:01  -  06-03 @ 07:00  --------------------------------------------------------  IN: 911.6 mL / OUT: 1000 mL / NET: -88.4 mL    06-03 @ 07:01  -  06-03 @ 21:14  --------------------------------------------------------  IN: 1000 mL / OUT: 200 mL / NET: 800 mL          ## P/E:  Gen: lying comfortably in bed in no apparent distress  HEENT: PERRL, NGT in place  Resp: bilateral rhonchi  CVS: mildly tachycardic  Abd: soft NT/ND +BS  Ext: mild bilateral hand edema  Neuro: encephalopathic rambling and mumbling incomprehensible words, not following commands    CENTRAL LINE: [ ] YES [x] NO    CHAN: [ ] YES [x] NO      A-LINE:  [ ] YES [x] NO      GLOBAL ISSUE/BEST PRACTICE:  Analgesia: n/a   Sedation: librium  HOB elevation: yes  Stress ulcer prophylaxis: protonix  VTE prophylaxis: lovenox  Oral Care: n/a   Glycemic control: n/a  Nutrition: vital AF tube feeds     CODE STATUS: [x] full code  [ ] DNR  [ ] DNI  [ ] MOLST  Goals of care discussion: [ ] yes

## 2018-06-03 NOTE — PROGRESS NOTE ADULT - SUBJECTIVE AND OBJECTIVE BOX
HPI:  53 year old man with PMH gerd, HLD, etoh abuse presents with complaint of tremors, abdominal pain, nausea, and body aches.  He states he stopped drinking yesterday and this feels typical of his withdrawals.  He feels agitated and anxious, has been sweating.    In the ED, work up consistent with gastritis with esophagitis, CIWA 10.  Lactate 6.1-->3.2. (25 May 2018 03:23)      24 hr events:      ## ROS:  [ ] unable to obtain  CONSTITUTIONAL: No fever, weight loss, or fatigue  EYES: No eye pain, visual disturbances, or discharge  ENMT:  No difficulty hearing, tinnitus, vertigo; No sinus or throat pain  NECK: No pain or stiffness  RESPIRATORY: No cough, wheezing, chills or hemoptysis; No shortness of breath  CARDIOVASCULAR: No chest pain, palpitations, dizziness, or leg swelling  GASTROINTESTINAL: No abdominal or epigastric pain. No nausea, vomiting, or hematemesis; No diarrhea or constipation. No melena or hematochezia.  GENITOURINARY: No dysuria, frequency, hematuria, or incontinence  NEUROLOGICAL: No headaches, memory loss, loss of strength, numbness, or tremors  SKIN: No itching, burning, rashes, or lesions   LYMPH NODES: No enlarged glands  ENDOCRINE: No heat or cold intolerance; No hair loss  MUSCULOSKELETAL: No joint pain or swelling; No muscle, back, or extremity pain  PSYCHIATRIC: No depression, anxiety, mood swings, or difficulty sleeping  HEME/LYMPH: No easy bruising, or bleeding gums  ALLERGY AND IMMUNOLOGIC: No hives or eczema    ## Labs:  CBC:                        14.3   4.89  )-----------( 323      ( 2018 06:11 )             44.7     Chem:  06-    148<H>  |  112<H>  |  8   ----------------------------<  91  4.1   |  25  |  0.76    Ca    9.0      2018 06:11  Phos  2.5     06-  Mg     2.3     -    TPro  8.2  /  Alb  2.8<L>  /  TBili  0.2  /  DBili  x   /  AST  22  /  ALT  23  /  AlkPhos  44  -    Coags:          ## Imaging:    ## Medications:  ampicillin/sulbactam  IVPB 3 Gram(s) IV Intermittent every 6 hours          enoxaparin Injectable 40 milliGRAM(s) SubCutaneous daily    pantoprazole   Suspension 40 milliGRAM(s) Oral before breakfast  polyethylene glycol 3350 17 Gram(s) Oral daily PRN  sucralfate suspension 1 Gram(s) Oral every 6 hours    acetaminophen  Suppository 650 milliGRAM(s) Rectal every 6 hours PRN  chlordiazePOXIDE 100 milliGRAM(s) Oral every 8 hours  midazolam Injectable 2 milliGRAM(s) IV Push every 4 hours PRN  PHENobarbital Injectable 130 milliGRAM(s) IV Push every 30 minutes PRN  PHENobarbital Injectable 260 milliGRAM(s) IV Push every 30 minutes PRN      ## Vitals:  T(C): 37.1 (18 @ 19:45), Max: 37.8 (18 @ 16:29)  HR: 105 (18 @ 19:26) (99 - 127)  BP: 120/71 (18 @ 19:00) (113/68 - 145/92)  BP(mean): 77 (18 @ 19:00) (73 - 103)  RR: 20 (18 @ 19:26) (0 - 36)  SpO2: 97% (18 @ 19:26) (93% - 100%)  Wt(kg): --  Vent:   AB-02 @ 07:01  -   @ 07:00  --------------------------------------------------------  IN: 911.6 mL / OUT: 1000 mL / NET: -88.4 mL     @ 07:01  -   @ 21:15  --------------------------------------------------------  IN: 1000 mL / OUT: 200 mL / NET: 800 mL          ## P/E:  Gen: lying comfortably in bed in no apparent distress  HEENT: PERRL, EOMI  Resp: CTA B/L no c/r/w  CVS: S1S2 no m/r/g  Abd: soft NT/ND +BS  Ext: no c/c/e  Neuro: A&Ox3    CENTRAL LINE: [ ] YES [ ] NO  LOCATION:   DATE INSERTED:  REMOVE: [ ] YES [ ] NO      CHAN: [ ] YES [ ] NO    DATE INSERTED:  REMOVE:  [ ] YES [ ] NO      A-LINE:  [ ] YES [ ] NO  LOCATION:   DATE INSERTED:  REMOVE:  [ ] YES [ ] NO  EXPLAIN:    GLOBAL ISSUE/BEST PRACTICE:  Analgesia:  Sedation:  HOB elevation: yes  Stress ulcer prophylaxis:  VTE prophylaxis:  Oral Care:  Glycemic control:  Nutrition:    CODE STATUS: [ ] full code  [ ] DNR  [ ] DNI  [ ] MOLST  Goals of care discussion: [ ] yes

## 2018-06-04 LAB
ALBUMIN SERPL ELPH-MCNC: 2.6 G/DL — LOW (ref 3.3–5)
ALP SERPL-CCNC: 44 U/L — SIGNIFICANT CHANGE UP (ref 40–120)
ALT FLD-CCNC: 22 U/L — SIGNIFICANT CHANGE UP (ref 12–78)
ANION GAP SERPL CALC-SCNC: 9 MMOL/L — SIGNIFICANT CHANGE UP (ref 5–17)
AST SERPL-CCNC: 19 U/L — SIGNIFICANT CHANGE UP (ref 15–37)
BASE EXCESS BLDA CALC-SCNC: -0.8 MMOL/L — SIGNIFICANT CHANGE UP (ref -2–2)
BASOPHILS # BLD AUTO: 0.08 K/UL — SIGNIFICANT CHANGE UP (ref 0–0.2)
BASOPHILS NFR BLD AUTO: 1 % — SIGNIFICANT CHANGE UP (ref 0–2)
BILIRUB SERPL-MCNC: 0.2 MG/DL — SIGNIFICANT CHANGE UP (ref 0.2–1.2)
BLOOD GAS COMMENTS: SIGNIFICANT CHANGE UP
BLOOD GAS SOURCE: SIGNIFICANT CHANGE UP
BUN SERPL-MCNC: 9 MG/DL — SIGNIFICANT CHANGE UP (ref 7–23)
CALCIUM SERPL-MCNC: 8.8 MG/DL — SIGNIFICANT CHANGE UP (ref 8.5–10.1)
CHLORIDE SERPL-SCNC: 104 MMOL/L — SIGNIFICANT CHANGE UP (ref 96–108)
CO2 SERPL-SCNC: 26 MMOL/L — SIGNIFICANT CHANGE UP (ref 22–31)
CREAT SERPL-MCNC: 0.82 MG/DL — SIGNIFICANT CHANGE UP (ref 0.5–1.3)
CULTURE RESULTS: SIGNIFICANT CHANGE UP
CULTURE RESULTS: SIGNIFICANT CHANGE UP
EOSINOPHIL # BLD AUTO: 0.16 K/UL — SIGNIFICANT CHANGE UP (ref 0–0.5)
EOSINOPHIL NFR BLD AUTO: 1.9 % — SIGNIFICANT CHANGE UP (ref 0–6)
GLUCOSE SERPL-MCNC: 95 MG/DL — SIGNIFICANT CHANGE UP (ref 70–99)
GRAM STN FLD: SIGNIFICANT CHANGE UP
HCO3 BLDA-SCNC: 24 MMOL/L — SIGNIFICANT CHANGE UP (ref 21–29)
HCT VFR BLD CALC: 43.6 % — SIGNIFICANT CHANGE UP (ref 39–50)
HGB BLD-MCNC: 13.8 G/DL — SIGNIFICANT CHANGE UP (ref 13–17)
HOROWITZ INDEX BLDA+IHG-RTO: 50 — SIGNIFICANT CHANGE UP
IMM GRANULOCYTES NFR BLD AUTO: 2.1 % — HIGH (ref 0–1.5)
LYMPHOCYTES # BLD AUTO: 1.57 K/UL — SIGNIFICANT CHANGE UP (ref 1–3.3)
LYMPHOCYTES # BLD AUTO: 18.7 % — SIGNIFICANT CHANGE UP (ref 13–44)
MAGNESIUM SERPL-MCNC: 2.5 MG/DL — SIGNIFICANT CHANGE UP (ref 1.6–2.6)
MCHC RBC-ENTMCNC: 27.8 PG — SIGNIFICANT CHANGE UP (ref 27–34)
MCHC RBC-ENTMCNC: 31.7 GM/DL — LOW (ref 32–36)
MCV RBC AUTO: 87.7 FL — SIGNIFICANT CHANGE UP (ref 80–100)
MONOCYTES # BLD AUTO: 0.81 K/UL — SIGNIFICANT CHANGE UP (ref 0–0.9)
MONOCYTES NFR BLD AUTO: 9.6 % — SIGNIFICANT CHANGE UP (ref 2–14)
NEUTROPHILS # BLD AUTO: 5.61 K/UL — SIGNIFICANT CHANGE UP (ref 1.8–7.4)
NEUTROPHILS NFR BLD AUTO: 66.7 % — SIGNIFICANT CHANGE UP (ref 43–77)
NRBC # BLD: 0 /100 WBCS — SIGNIFICANT CHANGE UP (ref 0–0)
PCO2 BLDA: 40 MMHG — SIGNIFICANT CHANGE UP (ref 32–46)
PH BLD: 7.38 — SIGNIFICANT CHANGE UP (ref 7.35–7.45)
PHOSPHATE SERPL-MCNC: 4 MG/DL — SIGNIFICANT CHANGE UP (ref 2.5–4.5)
PLATELET # BLD AUTO: 279 K/UL — SIGNIFICANT CHANGE UP (ref 150–400)
PO2 BLDA: 95 MMHG — SIGNIFICANT CHANGE UP (ref 74–108)
POTASSIUM SERPL-MCNC: 3.9 MMOL/L — SIGNIFICANT CHANGE UP (ref 3.5–5.3)
POTASSIUM SERPL-SCNC: 3.9 MMOL/L — SIGNIFICANT CHANGE UP (ref 3.5–5.3)
PROT SERPL-MCNC: 7.9 GM/DL — SIGNIFICANT CHANGE UP (ref 6–8.3)
RBC # BLD: 4.97 M/UL — SIGNIFICANT CHANGE UP (ref 4.2–5.8)
RBC # FLD: 13.5 % — SIGNIFICANT CHANGE UP (ref 10.3–14.5)
SAO2 % BLDA: 97 % — HIGH (ref 92–96)
SODIUM SERPL-SCNC: 139 MMOL/L — SIGNIFICANT CHANGE UP (ref 135–145)
SPECIMEN SOURCE: SIGNIFICANT CHANGE UP
WBC # BLD: 8.41 K/UL — SIGNIFICANT CHANGE UP (ref 3.8–10.5)
WBC # FLD AUTO: 8.41 K/UL — SIGNIFICANT CHANGE UP (ref 3.8–10.5)

## 2018-06-04 PROCEDURE — 31500 INSERT EMERGENCY AIRWAY: CPT

## 2018-06-04 PROCEDURE — 71045 X-RAY EXAM CHEST 1 VIEW: CPT | Mod: 26,59

## 2018-06-04 PROCEDURE — 99291 CRITICAL CARE FIRST HOUR: CPT | Mod: 25

## 2018-06-04 RX ORDER — MIDAZOLAM HYDROCHLORIDE 1 MG/ML
2 INJECTION, SOLUTION INTRAMUSCULAR; INTRAVENOUS ONCE
Qty: 0 | Refills: 0 | Status: DISCONTINUED | OUTPATIENT
Start: 2018-06-04 | End: 2018-06-05

## 2018-06-04 RX ORDER — CHLORHEXIDINE GLUCONATE 213 G/1000ML
15 SOLUTION TOPICAL
Qty: 0 | Refills: 0 | Status: DISCONTINUED | OUTPATIENT
Start: 2018-06-04 | End: 2018-06-08

## 2018-06-04 RX ORDER — DEXMEDETOMIDINE HYDROCHLORIDE IN 0.9% SODIUM CHLORIDE 4 UG/ML
0.1 INJECTION INTRAVENOUS
Qty: 200 | Refills: 0 | Status: DISCONTINUED | OUTPATIENT
Start: 2018-06-04 | End: 2018-06-08

## 2018-06-04 RX ADMIN — MIDAZOLAM HYDROCHLORIDE 2 MILLIGRAM(S): 1 INJECTION, SOLUTION INTRAMUSCULAR; INTRAVENOUS at 20:01

## 2018-06-04 RX ADMIN — ENOXAPARIN SODIUM 40 MILLIGRAM(S): 100 INJECTION SUBCUTANEOUS at 12:45

## 2018-06-04 RX ADMIN — CHLORHEXIDINE GLUCONATE 1 APPLICATION(S): 213 SOLUTION TOPICAL at 06:03

## 2018-06-04 RX ADMIN — CHLORHEXIDINE GLUCONATE 15 MILLILITER(S): 213 SOLUTION TOPICAL at 17:53

## 2018-06-04 RX ADMIN — AMPICILLIN SODIUM AND SULBACTAM SODIUM 200 GRAM(S): 250; 125 INJECTION, POWDER, FOR SUSPENSION INTRAMUSCULAR; INTRAVENOUS at 17:53

## 2018-06-04 RX ADMIN — Medication 1 GRAM(S): at 06:04

## 2018-06-04 RX ADMIN — AMPICILLIN SODIUM AND SULBACTAM SODIUM 200 GRAM(S): 250; 125 INJECTION, POWDER, FOR SUSPENSION INTRAMUSCULAR; INTRAVENOUS at 01:03

## 2018-06-04 RX ADMIN — Medication 1 GRAM(S): at 12:45

## 2018-06-04 RX ADMIN — Medication 1 GRAM(S): at 23:08

## 2018-06-04 RX ADMIN — Medication 650 MILLIGRAM(S): at 23:11

## 2018-06-04 RX ADMIN — Medication 100 MILLIGRAM(S): at 06:04

## 2018-06-04 RX ADMIN — Medication 1 GRAM(S): at 01:03

## 2018-06-04 RX ADMIN — AMPICILLIN SODIUM AND SULBACTAM SODIUM 200 GRAM(S): 250; 125 INJECTION, POWDER, FOR SUSPENSION INTRAMUSCULAR; INTRAVENOUS at 12:46

## 2018-06-04 RX ADMIN — Medication 1 MILLIGRAM(S): at 12:45

## 2018-06-04 RX ADMIN — Medication 1 GRAM(S): at 17:53

## 2018-06-04 RX ADMIN — Medication 1 TABLET(S): at 12:45

## 2018-06-04 RX ADMIN — AMPICILLIN SODIUM AND SULBACTAM SODIUM 200 GRAM(S): 250; 125 INJECTION, POWDER, FOR SUSPENSION INTRAMUSCULAR; INTRAVENOUS at 06:03

## 2018-06-04 RX ADMIN — Medication 100 MILLIGRAM(S): at 12:45

## 2018-06-04 RX ADMIN — PANTOPRAZOLE SODIUM 40 MILLIGRAM(S): 20 TABLET, DELAYED RELEASE ORAL at 12:45

## 2018-06-04 RX ADMIN — AMPICILLIN SODIUM AND SULBACTAM SODIUM 200 GRAM(S): 250; 125 INJECTION, POWDER, FOR SUSPENSION INTRAMUSCULAR; INTRAVENOUS at 23:08

## 2018-06-04 RX ADMIN — Medication 50 MILLIGRAM(S): at 15:56

## 2018-06-04 NOTE — PROGRESS NOTE ADULT - SUBJECTIVE AND OBJECTIVE BOX
HPI:  Pt is a 52 yo M with h/o HLD, GERD and ETOH abuse with prior admission 2 to ETOH w/drawal. Pt presented 2 to c/o tremors, abdominal pain, nausea, and body aches. Pt states he stopped drinking 5/24 and he feels agitated, anxious and sweating like his typical withdrawal. Pt admitted to [facility] and RRT/ code gray called 2 to increased agitation. Admitting dx 1) ETOH abuse with w/drawal/DTs 2) Hypokalemia    Resp: Elevate HOB  CVS: [place] 2 to Precedex  FEN: Daily Thiamine, MVI and Folate/ NPO until MS improves/ Replace K   Endo: Follow Glu  Neuro/Psych: Cont Phenobarb and Precedex    CCT: 35 min       ## Labs:  CBC:                        13.8   8.41  )-----------( 279      ( 04 Jun 2018 04:24 )             43.6     Chem:  06-04    139  |  104  |  9   ----------------------------<  95  3.9   |  26  |  0.82    Ca    8.8      04 Jun 2018 04:23  Phos  4.0     06-04  Mg     2.5     06-04    TPro  7.9  /  Alb  2.6<L>  /  TBili  0.2  /  DBili  x   /  AST  19  /  ALT  22  /  AlkPhos  44  06-04    Coags:          ## Imaging:    ## Medications:  ampicillin/sulbactam  IVPB 3 Gram(s) IV Intermittent every 6 hours          enoxaparin Injectable 40 milliGRAM(s) SubCutaneous daily    pantoprazole   Suspension 40 milliGRAM(s) Oral before breakfast  polyethylene glycol 3350 17 Gram(s) Oral daily PRN  sucralfate suspension 1 Gram(s) Oral every 6 hours    acetaminophen  Suppository 650 milliGRAM(s) Rectal every 6 hours PRN  chlordiazePOXIDE 50 milliGRAM(s) Oral every 8 hours  midazolam Injectable 2 milliGRAM(s) IV Push every 4 hours PRN  PHENobarbital Injectable 130 milliGRAM(s) IV Push every 30 minutes PRN  PHENobarbital Injectable 260 milliGRAM(s) IV Push every 30 minutes PRN      ## Vitals:  T(C): 35.9 (06-04-18 @ 13:00), Max: 37.8 (06-03-18 @ 16:29)  HR: 90 (06-04-18 @ 14:00) (78 - 111)  BP: 126/92 (06-04-18 @ 14:00) (98/67 - 132/91)  BP(mean): 99 (06-04-18 @ 14:00) (65 - 101)  RR: 16 (06-04-18 @ 14:00) (9 - 21)  SpO2: 100% (06-04-18 @ 14:00) (63% - 100%)  Wt(kg): --  Vent: Mode: AC/ CMV (Assist Control/ Continuous Mandatory Ventilation), RR (machine): 14, RR (patient): 17, TV (machine): 450, FiO2: 60, PEEP: 5, PIP: 15  ABG: ABG - ( 04 Jun 2018 13:29 )  pH, Arterial: x     pH, Blood: 7.38  /  pCO2: 40    /  pO2: 95    / HCO3: 24    / Base Excess: -0.8  /  SaO2: 97                    06-03 @ 07:01  -  06-04 @ 07:00  --------------------------------------------------------  IN: 2100 mL / OUT: 610 mL / NET: 1490 mL    06-04 @ 07:01  -  06-04 @ 15:19  --------------------------------------------------------  IN: 650 mL / OUT: 0 mL / NET: 650 mL          ## P/E:  Gen: lying comfortably in bed in no apparent distress  Mouth:   Neck:  Lungs:   Heart:   Abd:  Ext:  Neuro:    CENTRAL LINE: [ ] YES [ ] NO  LOCATION:   DATE INSERTED:  REMOVE: [ ] YES [ ] NO      CHAN: [ ] YES [ ] NO    DATE INSERTED:  REMOVE:  [ ] YES [ ] NO      A-LINE:  [ ] YES [ ] NO  LOCATION:   DATE INSERTED:  REMOVE:  [ ] YES [ ] NO  EXPLAIN:    GLOBAL ISSUE/BEST PRACTICE:  Analgesia:  Sedation:  HOB elevation: yes  Stress ulcer prophylaxis:  VTE prophylaxis:  Oral Care:  Glycemic control:  Nutrition:    CODE STATUS: [ ] full code  [ ] DNR  [ ] DNI  [ ] MOLST  Goals of care discussion: [ ] yes

## 2018-06-04 NOTE — PROGRESS NOTE ADULT - ASSESSMENT
Pt is a 54 yo M with h/o HLD, GERD and ETOH abuse with prior admission 2 to ETOH w/drawal. Pt presented 2 to c/o tremors, abdominal pain, nausea, and body aches. Pt states he stopped drinking 5/24 and he feels agitated, anxious and sweating like his typical withdrawal. Pt admitted to [facility] and RRT/ code gray called 2 to increased agitation. Admitting dx 1) ETOH abuse with w/drawal/DTs 2) Hypokalemia. Since admission pt with worsening ETOH withdrawal/DTs/ encephalopathy, copious oral secretions requiring frequent suctioning and desaturating on nasal cannula. Sputum (+) for Staph although no sign infiltrate on CXR: tracheobronchitis. This afternoon pt desat'ing and obtunded; intubated with # 8.0 ET    Resp: F/u CXR and ABG post intubation  ID: Finish course of Abx  FEN: Cont enteral feeds and daily Thiamine, MVI and Folate  Endo: Follow Glu  Neuro/Psych: If pt requires sedation start Propofol    CCT: 40 min not including the intubation

## 2018-06-04 NOTE — CHART NOTE - NSCHARTNOTEFT_GEN_A_CORE
Assessment:  Noted pt with ETOHA presents with Alcohol withdrawal, delirium tremors    Factors impacting intake: [ ] none [ ] nausea  [ ] vomiting [ ] diarrhea [ ] constipation  [ ]chewing problems [ ] swallowing issues  [ ] other:     Diet Prescription: Diet, NPO with Tube Feed:   Tube Feeding Modality: Nasogastric  Vital AF  Total Volume for 24 Hours (mL): 1200  Continuous  Starting Tube Feed Rate {mL per Hour}: 40  Increase Tube Feed Rate by (mL): 10     Every 8 hours  Until Goal Tube Feed Rate (mL per Hour): 50  Tube Feed Duration (in Hours): 24  Tube Feed Start Time: 13:23 (05-31-18 @ 13:23)    Intake:     Current Weight: Weight (kg): 80 (05-30 @ 07:30)  % Weight Change    Pertinent Medications: MEDICATIONS  (STANDING):  ampicillin/sulbactam  IVPB 3 Gram(s) IV Intermittent every 6 hours  chlordiazePOXIDE 50 milliGRAM(s) Oral every 8 hours  chlorhexidine 0.12% Liquid 15 milliLiter(s) Swish and Spit two times a day  chlorhexidine 4% Liquid 1 Application(s) Topical <User Schedule>  enoxaparin Injectable 40 milliGRAM(s) SubCutaneous daily  folic acid 1 milliGRAM(s) Oral daily  multivitamin/minerals 1 Tablet(s) Oral daily  pantoprazole   Suspension 40 milliGRAM(s) Oral before breakfast  sucralfate suspension 1 Gram(s) Oral every 6 hours  thiamine 100 milliGRAM(s) Oral daily    MEDICATIONS  (PRN):  acetaminophen  Suppository 650 milliGRAM(s) Rectal every 6 hours PRN For Temp greater than 38 C (100.4 F)  midazolam Injectable 2 milliGRAM(s) IV Push every 4 hours PRN agitation  PHENobarbital Injectable 130 milliGRAM(s) IV Push every 30 minutes PRN ciwa > 8  PHENobarbital Injectable 260 milliGRAM(s) IV Push every 30 minutes PRN ciwa > 20  polyethylene glycol 3350 17 Gram(s) Oral daily PRN Constipation    Pertinent Labs: 06-04 Na139 mmol/L Glu 95 mg/dL K+ 3.9 mmol/L Cr  0.82 mg/dL BUN 9 mg/dL 06-04 Phos 4.0 mg/dL 06-04 Alb 2.6 g/dL<L>     CAPILLARY BLOOD GLUCOSE        Skin:     Estimated Needs:   [ ] no change since previous assessment  [ ] recalculated:     Previous Nutrition Diagnosis:   [ ] Inadequate Energy Intake [ ]Inadequate Oral Intake [ ] Excessive Energy Intake   [ ] Underweight [ ] Increased Nutrient Needs [ ] Overweight/Obesity   [ ] Altered GI Function [ ] Unintended Weight Loss [ ] Food & Nutrition Related Knowledge Deficit [ ] severe Malnutrition [ ] moderate malnutrition    Nutrition Diagnosis is [ ] ongoing  [ ] resolved  [ ] improved  [ ] not applicable     New Nutrition Diagnosis: [ ] not applicable       Interventions:   Recommend  [ ] Continue:  [ ] Change Diet To:  [ ] Nutrition Supplement:  [ ] Nutrition Support:  [ ] Other:     Monitoring and Evaluation:   [ ] PO intake [ x ] Tolerance to diet prescription [ x ] weights [ x ] labs[ x ] follow up per protocol  [ ] other: Assessment:  Noted pt with ETOHA presents with Alcohol withdrawal, delirium tremors. Pt intubated 6/4 due to desaturation 68% & remains on NGT feeding since 5/30 due to inability to consume po diet from detox.     Factors impacting intake: [x ] none [ ] nausea  [ ] vomiting [ ] diarrhea [ ] constipation  [ ]chewing problems [ ] swallowing issues  [ ] other:     Diet Prescription: Diet, NPO with Tube Feed:   Tube Feeding Modality: Nasogastric  Vital AF  Total Volume for 24 Hours (mL): 1200  Continuous  Starting Tube Feed Rate {mL per Hour}: 40  Increase Tube Feed Rate by (mL): 10     Every 8 hours  Until Goal Tube Feed Rate (mL per Hour): 50  Tube Feed Duration (in Hours): 24  Tube Feed Start Time: 13:23 (05-31-18 @ 13:23)    Intake:  NGT feeding provides 1200ml, 1440kcal & 90gm protein & Free water 250ml q8h.  TF tolerated well. RN reports no residuals. Last BM x 2(5/31)    Current Weight: Weight (kg):  Wt=80.1(6/4); +2 edema Valentin hands & Valentin feet, Wt=80.9kg(5/25)  % Weight Change  0.6kg wt gain x 10 days    Pertinent Medications: MEDICATIONS  (STANDING):  ampicillin/sulbactam  IVPB 3 Gram(s) IV Intermittent every 6 hours  chlordiazePOXIDE 50 milliGRAM(s) Oral every 8 hours  chlorhexidine 0.12% Liquid 15 milliLiter(s) Swish and Spit two times a day  chlorhexidine 4% Liquid 1 Application(s) Topical <User Schedule>  enoxaparin Injectable 40 milliGRAM(s) SubCutaneous daily  folic acid 1 milliGRAM(s) Oral daily  multivitamin/minerals 1 Tablet(s) Oral daily  pantoprazole   Suspension 40 milliGRAM(s) Oral before breakfast  sucralfate suspension 1 Gram(s) Oral every 6 hours  thiamine 100 milliGRAM(s) Oral daily    MEDICATIONS  (PRN):  acetaminophen  Suppository 650 milliGRAM(s) Rectal every 6 hours PRN For Temp greater than 38 C (100.4 F)  midazolam Injectable 2 milliGRAM(s) IV Push every 4 hours PRN agitation  PHENobarbital Injectable 130 milliGRAM(s) IV Push every 30 minutes PRN ciwa > 8  PHENobarbital Injectable 260 milliGRAM(s) IV Push every 30 minutes PRN ciwa > 20  polyethylene glycol 3350 17 Gram(s) Oral daily PRN Constipation    Pertinent Labs: 06-04 Na139 mmol/L Glu 95 mg/dL K+ 3.9 mmol/L Cr  0.82 mg/dL BUN 9 mg/dL 06-04 Phos 4.0 mg/dL 06-04 Alb 2.6 g/dL<L>, H/H=13.8/43.6, Lipase=67(5/30), Alb=2.6(6/4), Large ketones(5/30)     CAPILLARY BLOOD GLUCOSE        Skin: intact    Estimated Needs:   [x ] no change since previous assessment 5/28/18  [ ] recalculated:     Previous Nutrition Diagnosis:   [x ] Inadequate Energy Intake [ ]Inadequate Oral Intake [ ] Excessive Energy Intake   [ ] Underweight [ ] Increased Nutrient Needs [ ] Overweight/Obesity   [ ] Altered GI Function [ ] Unintended Weight Loss [ ] Food & Nutrition Related Knowledge Deficit [ ] severe Malnutrition [ ] moderate malnutrition    Nutrition Diagnosis is [ ] ongoing  [x ] resolved  [ ] improved  [ ] not applicable     New Nutrition Diagnosis: [x ] not applicable  No nutrition dx       Interventions:   Recommend  [x ] Continue: Vital AF 1.2 @ 50ml/hr via DEB=0231hr, 1440kcal & 90gm protein  [ ] Change Diet To:  [ ] Nutrition Supplement:  [ ] Nutrition Support:  [ ] Other:     Monitoring and Evaluation:   [ ] PO intake [ x ] Tolerance to diet prescription [ x ] weights [ x ] labs[ x ] follow up per protocol  [ ] other:

## 2018-06-05 LAB
ANION GAP SERPL CALC-SCNC: 7 MMOL/L — SIGNIFICANT CHANGE UP (ref 5–17)
BUN SERPL-MCNC: 10 MG/DL — SIGNIFICANT CHANGE UP (ref 7–23)
CALCIUM SERPL-MCNC: 8.6 MG/DL — SIGNIFICANT CHANGE UP (ref 8.5–10.1)
CHLORIDE SERPL-SCNC: 107 MMOL/L — SIGNIFICANT CHANGE UP (ref 96–108)
CO2 SERPL-SCNC: 28 MMOL/L — SIGNIFICANT CHANGE UP (ref 22–31)
CREAT SERPL-MCNC: 0.81 MG/DL — SIGNIFICANT CHANGE UP (ref 0.5–1.3)
GLUCOSE SERPL-MCNC: 129 MG/DL — HIGH (ref 70–99)
HCT VFR BLD CALC: 36.6 % — LOW (ref 39–50)
HGB BLD-MCNC: 11.6 G/DL — LOW (ref 13–17)
MAGNESIUM SERPL-MCNC: 2.5 MG/DL — SIGNIFICANT CHANGE UP (ref 1.6–2.6)
MCHC RBC-ENTMCNC: 27.7 PG — SIGNIFICANT CHANGE UP (ref 27–34)
MCHC RBC-ENTMCNC: 31.7 GM/DL — LOW (ref 32–36)
MCV RBC AUTO: 87.4 FL — SIGNIFICANT CHANGE UP (ref 80–100)
NRBC # BLD: 0 /100 WBCS — SIGNIFICANT CHANGE UP (ref 0–0)
PHOSPHATE SERPL-MCNC: 1.5 MG/DL — LOW (ref 2.5–4.5)
PLATELET # BLD AUTO: 398 K/UL — SIGNIFICANT CHANGE UP (ref 150–400)
POTASSIUM SERPL-MCNC: 4.4 MMOL/L — SIGNIFICANT CHANGE UP (ref 3.5–5.3)
POTASSIUM SERPL-SCNC: 4.4 MMOL/L — SIGNIFICANT CHANGE UP (ref 3.5–5.3)
RBC # BLD: 4.19 M/UL — LOW (ref 4.2–5.8)
RBC # FLD: 13.4 % — SIGNIFICANT CHANGE UP (ref 10.3–14.5)
SODIUM SERPL-SCNC: 142 MMOL/L — SIGNIFICANT CHANGE UP (ref 135–145)
WBC # BLD: 8.96 K/UL — SIGNIFICANT CHANGE UP (ref 3.8–10.5)
WBC # FLD AUTO: 8.96 K/UL — SIGNIFICANT CHANGE UP (ref 3.8–10.5)

## 2018-06-05 PROCEDURE — 99291 CRITICAL CARE FIRST HOUR: CPT

## 2018-06-05 RX ORDER — SODIUM CHLORIDE 9 MG/ML
1000 INJECTION, SOLUTION INTRAVENOUS ONCE
Qty: 0 | Refills: 0 | Status: COMPLETED | OUTPATIENT
Start: 2018-06-05 | End: 2018-06-05

## 2018-06-05 RX ORDER — SODIUM CHLORIDE 9 MG/ML
1000 INJECTION, SOLUTION INTRAVENOUS
Qty: 0 | Refills: 0 | Status: DISCONTINUED | OUTPATIENT
Start: 2018-06-05 | End: 2018-06-05

## 2018-06-05 RX ADMIN — Medication 1 GRAM(S): at 05:52

## 2018-06-05 RX ADMIN — Medication 1 GRAM(S): at 18:06

## 2018-06-05 RX ADMIN — AMPICILLIN SODIUM AND SULBACTAM SODIUM 200 GRAM(S): 250; 125 INJECTION, POWDER, FOR SUSPENSION INTRAMUSCULAR; INTRAVENOUS at 05:52

## 2018-06-05 RX ADMIN — ENOXAPARIN SODIUM 40 MILLIGRAM(S): 100 INJECTION SUBCUTANEOUS at 11:49

## 2018-06-05 RX ADMIN — AMPICILLIN SODIUM AND SULBACTAM SODIUM 200 GRAM(S): 250; 125 INJECTION, POWDER, FOR SUSPENSION INTRAMUSCULAR; INTRAVENOUS at 18:07

## 2018-06-05 RX ADMIN — DEXMEDETOMIDINE HYDROCHLORIDE IN 0.9% SODIUM CHLORIDE 2 MICROGRAM(S)/KG/HR: 4 INJECTION INTRAVENOUS at 16:29

## 2018-06-05 RX ADMIN — Medication 1 TABLET(S): at 11:49

## 2018-06-05 RX ADMIN — MIDAZOLAM HYDROCHLORIDE 2 MILLIGRAM(S): 1 INJECTION, SOLUTION INTRAMUSCULAR; INTRAVENOUS at 00:00

## 2018-06-05 RX ADMIN — CHLORHEXIDINE GLUCONATE 15 MILLILITER(S): 213 SOLUTION TOPICAL at 07:12

## 2018-06-05 RX ADMIN — AMPICILLIN SODIUM AND SULBACTAM SODIUM 200 GRAM(S): 250; 125 INJECTION, POWDER, FOR SUSPENSION INTRAMUSCULAR; INTRAVENOUS at 23:32

## 2018-06-05 RX ADMIN — PANTOPRAZOLE SODIUM 40 MILLIGRAM(S): 20 TABLET, DELAYED RELEASE ORAL at 11:49

## 2018-06-05 RX ADMIN — Medication 1 MILLIGRAM(S): at 11:49

## 2018-06-05 RX ADMIN — AMPICILLIN SODIUM AND SULBACTAM SODIUM 200 GRAM(S): 250; 125 INJECTION, POWDER, FOR SUSPENSION INTRAMUSCULAR; INTRAVENOUS at 11:49

## 2018-06-05 RX ADMIN — Medication 1 GRAM(S): at 23:32

## 2018-06-05 RX ADMIN — Medication 1 GRAM(S): at 11:49

## 2018-06-05 RX ADMIN — SODIUM CHLORIDE 2000 MILLILITER(S): 9 INJECTION, SOLUTION INTRAVENOUS at 06:38

## 2018-06-05 RX ADMIN — CHLORHEXIDINE GLUCONATE 15 MILLILITER(S): 213 SOLUTION TOPICAL at 18:06

## 2018-06-05 RX ADMIN — Medication 100 MILLIGRAM(S): at 11:49

## 2018-06-05 RX ADMIN — Medication 650 MILLIGRAM(S): at 07:20

## 2018-06-05 RX ADMIN — CHLORHEXIDINE GLUCONATE 1 APPLICATION(S): 213 SOLUTION TOPICAL at 07:12

## 2018-06-05 NOTE — PROGRESS NOTE ADULT - SUBJECTIVE AND OBJECTIVE BOX
HPI:  Pt is a 52 yo M with h/o HLD, GERD and ETOH abuse with prior admission 2 to ETOH w/drawal. Pt presented 2 to c/o tremors, abdominal pain, nausea, and body aches. Pt states he stopped drinking 5/24 and he feels agitated, anxious and sweating like his typical withdrawal. Pt admitted to [facility] and RRT/ code gray called 2 to increased agitation. Admitting dx 1) ETOH abuse with w/drawal/DTs 2) Hypokalemia. Since admission pt with worsening ETOH withdrawal/DTs/ encephalopathy, copious oral secretions requiring frequent suctioning and desaturating on nasal cannula. Sputum (+) for Staph although no sign infiltrate on CXR: tracheobronchitis. 6/4 pt desat'ing and obtunded; intubated       ## Labs:  CBC:                        11.6   8.96  )-----------( 398      ( 05 Jun 2018 03:57 )             36.6     Chem:  06-05    142  |  107  |  10  ----------------------------<  129<H>  4.4   |  28  |  0.81    Ca    8.6      05 Jun 2018 03:57  Phos  1.5     06-05  Mg     2.5     06-05    TPro  7.9  /  Alb  2.6<L>  /  TBili  0.2  /  DBili  x   /  AST  19  /  ALT  22  /  AlkPhos  44  06-04    Coags:    culture blood:  --  06-04 @ 17:22            culture sputum:     Numerous Pseudomonas aeruginosa  Normal Respiratory Melanie present           culture urine:  -- 06-04 @ 17:22        ## Imaging:    ## Medications:  ampicillin/sulbactam  IVPB 3 Gram(s) IV Intermittent every 6 hours          enoxaparin Injectable 40 milliGRAM(s) SubCutaneous daily    pantoprazole   Suspension 40 milliGRAM(s) Oral before breakfast  polyethylene glycol 3350 17 Gram(s) Oral daily PRN  sucralfate suspension 1 Gram(s) Oral every 6 hours    acetaminophen  Suppository 650 milliGRAM(s) Rectal every 6 hours PRN  dexmedetomidine Infusion 0.1 MICROgram(s)/kG/Hr IV Continuous <Continuous>  midazolam Injectable 2 milliGRAM(s) IV Push every 4 hours PRN  PHENobarbital Injectable 130 milliGRAM(s) IV Push every 30 minutes PRN  PHENobarbital Injectable 260 milliGRAM(s) IV Push every 30 minutes PRN      ## Vitals:  T(C): 37.5 (06-05-18 @ 15:49), Max: 38.7 (06-05-18 @ 07:37)  HR: 56 (06-05-18 @ 21:39) (56 - 97)  BP: 95/55 (06-05-18 @ 19:00) (84/46 - 132/76)  BP(mean): 63 (06-05-18 @ 19:00) (55 - 91)  RR: 17 (06-05-18 @ 19:00) (13 - 24)  SpO2: 100% (06-05-18 @ 21:39) (96% - 100%)  Wt(kg): --  Vent: Mode: AC/ CMV (Assist Control/ Continuous Mandatory Ventilation), RR (machine): 14, RR (patient): 14, TV (machine): 500, FiO2: 30, PEEP: 5, PIP: 19  ABG: ABG - ( 04 Jun 2018 13:29 )  pH, Arterial: x     pH, Blood: 7.38  /  pCO2: 40    /  pO2: 95    / HCO3: 24    / Base Excess: -0.8  /  SaO2: 97                    06-04 @ 07:01  -  06-05 @ 07:00  --------------------------------------------------------  IN: 1646 mL / OUT: 1840 mL / NET: -194 mL    06-05 @ 07:01  -  06-05 @ 21:56  --------------------------------------------------------  IN: 910 mL / OUT: 665 mL / NET: 245 mL          ## P/E:  Gen: lying comfortably in bed in no apparent distress  Mouth: (+) ETT  Lungs: CTA  Heart: RRR  Abd: Soft/+BS  Ext: UE edema  Neuro: Sedated but arousable    CENTRAL LINE: [ ] YES [ x] NO  LOCATION:   DATE INSERTED:  REMOVE: [ ] YES [ ] NO      CHAN: [x ] YES [ ] NO    DATE INSERTED:  REMOVE:  [ ] YES [ ] NO      A-LINE:  [ ] YES [x ] NO  LOCATION:   DATE INSERTED:  REMOVE:  [ ] YES [ ] NO  EXPLAIN:      CODE STATUS: [x] full code  [ ] DNR  [ ] DNI  [ ] MOLST  Goals of care discussion: [ ] yes

## 2018-06-05 NOTE — PROGRESS NOTE ADULT - PROBLEM SELECTOR PLAN 2
ETOH abstinence
Prognosis is poor if ETOH abstinence is not achieved in future.
ETOH abstinence.  PPI, please reconsult as needed
PPI +,  Carafate.
PPI +,  Carafate.

## 2018-06-05 NOTE — PROGRESS NOTE ADULT - ASSESSMENT
Pt is a 54 yo M with h/o HLD, GERD and ETOH abuse with prior admission 2 to ETOH w/drawal. Pt presented 2 to c/o tremors, abdominal pain, nausea, and body aches. Pt states he stopped drinking 5/24 and he feels agitated, anxious and sweating like his typical withdrawal. Pt admitted to [facility] and RRT/ code gray called 2 to increased agitation. Admitting dx 1) ETOH abuse with w/drawal/DTs 2) Hypokalemia. Since admission pt with worsening ETOH withdrawal/DTs/ encephalopathy, copious oral secretions requiring frequent suctioning and desaturating on nasal cannula. Sputum (+) for Staph although no sign infiltrate on CXR: tracheobronchitis. 6/4 pt desat'ing and obtunded; intubated     Resp: Cont current vent settings/ Once pt clears his head wean to extubate  ID: Finish course of Abx  FEN: Cont enteral feeds and daily Thiamine, MVI and Folate  Endo: Follow Glu  Neuro/Psych:Cont Precedex and if pt requires further sedation use Propofol     CCT: 40 Pt is a 52 yo M with h/o HLD, GERD and ETOH abuse with prior admission 2 to ETOH w/drawal. Pt presented 2 to c/o tremors, abdominal pain, nausea, and body aches. Pt states he stopped drinking 5/24 and he feels agitated, anxious and sweating like his typical withdrawal. Pt admitted to [facility] and RRT/ code gray called 2 to increased agitation. Admitting dx 1) ETOH abuse with w/drawal/DTs 2) Hypokalemia. Since admission pt with worsening ETOH withdrawal/DTs/ encephalopathy, copious oral secretions requiring frequent suctioning and desaturating on nasal cannula. Sputum (+) for Staph although no sign infiltrate on CXR: tracheobronchitis. 6/4 pt desat'ing and obtunded; intubated     Resp: Cont current vent settings/ Once pt clears his head wean to extubate  ID: Finish course of Abx  FEN: Cont enteral feeds and daily Thiamine, MVI and Folate/ Replace Phos pt hypophosphatemic  Endo: Follow Glu  Neuro/Psych:Cont Precedex and if pt requires further sedation use Propofol     CCT: 40

## 2018-06-05 NOTE — ED PROVIDER NOTE - CROS ED EYES ALL NEG
History of Present Illness  The patient is a 24 year old female who presents today with a chief complaint of Vaginal Problem (10-12 days-Discharge and itching- tried OTC with no effect)    Pt complained of 10 days of whitish vaginal discharge, associated with itching. She used monistat 3, last use was Fri. It helped but the symptoms came back soon and she had a lot of itching yesterday.     History:    No past medical history on file.    No family history on file.     Past Surgical History:   Procedure Laterality Date   • Knee scope,diagnostic Right 2011    Knee Arthroscopy   • Wilder tooth extraction Bilateral        OB History    Para Term  AB Living   0 0 0 0 0 0   SAB TAB Ectopic Molar Multiple Live Births   0 0 0 0 0 0             Current Outpatient Prescriptions   Medication Sig Dispense Refill   • fluconazole (DIFLUCAN) 150 MG tablet Take 1 tablet by mouth once for 1 dose. 1 tablet 1   • clotrimazole-betamethasone (LOTRISONE) 1-0.05 % cream Apply topically 2 times daily as needed (itching). 30 g 0   • norethindrone-ethinyl estradiol (MICROGESTIN) 1-20 MG-MCG per tablet Take 1 tablet by mouth daily. 28 tablet 0   • minocycline (MINOCIN,DYNACIN) 100 MG capsule Take 100 mg by mouth 2 times daily.       No current facility-administered medications for this visit.        ALLERGIES:   Allergen Reactions   • Pollen Other (See Comments)       Social History     Social History   • Marital status: Single     Spouse name: N/A   • Number of children: N/A   • Years of education: N/A     Occupational History   • Not on file.     Social History Main Topics   • Smoking status: Never Smoker   • Smokeless tobacco: Never Used   • Alcohol use Yes      Comment: socially   • Drug use: No   • Sexual activity: Yes     Partners: Male     Birth control/ protection: Pill     Other Topics Concern   • Not on file     Social History Narrative   • No narrative on file         Health Maintenance Summary     Topic Due  On Due Status Completed On    IMMUNIZATION - HPV May 8, 2007 Overdue     PAP SMEAR - CERVICAL CANCER SCREENING Nov 2, 2020 Not Due Nov 2, 2017    Immunization - TDAP Pregnancy  Hidden     IMMUNIZATION - DTaP/Tdap/Td May 8, 2015 Overdue     Immunization-Influenza Sep 1, 2018 Not Due     Depression Screening May 8, 2008 Overdue           ROS:  I personally reviewed the Review of Systems and Past Medical, Family, and Social History as documented by the clinic staff.  Pertinent Items were discussed with patient.    Visit Vitals  /72   Pulse 80   Wt 62.5 kg   LMP 05/28/2018 (Exact Date)   BMI 22.58 kg/m²       Physical Exam:     Vital Signs:   Visit Vitals  /72   Pulse 80   Wt 62.5 kg   LMP 05/28/2018 (Exact Date)   BMI 22.58 kg/m²      General Appearance: Well groomed.   Neuropsychiatric: Mood and affect appropriate.    Pelvic:  External genitalia: Erythema noted on the bilateral minora and introitus  Urethral Meatus: Normal.  Urethra: No masses or tenderness.  Bladder: No cystocele.  Vagina: Normal appearance.  Whitish discharge.   Cervix: Normal appearance.  No discharge.       Assessments & Plan:  Winnie was seen today for vulvar problem.    Diagnoses and all orders for this visit:    Vaginal discharge  -     WET AMANDA WITH PREP BY PROVIDER    Other orders  -     fluconazole (DIFLUCAN) 150 MG tablet; Take 1 tablet by mouth once for 1 dose.  -     clotrimazole-betamethasone (LOTRISONE) 1-0.05 % cream; Apply topically 2 times daily as needed (itching).           negative...

## 2018-06-05 NOTE — PROGRESS NOTE ADULT - PROBLEM SELECTOR PROBLEM 1
Alcohol withdrawal syndrome, with delirium

## 2018-06-05 NOTE — PROGRESS NOTE ADULT - PROBLEM SELECTOR PROBLEM 2
GERD with esophagitis

## 2018-06-05 NOTE — PROGRESS NOTE ADULT - PROBLEM SELECTOR PLAN 1
MVI, thiamine, folic acid  IV Hydration.  ICU monitoring
MVI, thiamine, folic acid  IV Hydration
MVI, thiamine, folic acid  IV Hydration  CIWA monitoring, Continue with ICU withdrawal protocol.
MVI, thiamine, folic acid  IV Hydration  CIWA monitoring, Continue with ICU withdrawal protocol.  Prognosis is poor if ETOH abstinence is not achieved in future
MVI, thiamine, folic acid  IV Hydration.  ICU monitoring.

## 2018-06-05 NOTE — PROVIDER CONTACT NOTE (EICU) - ASSESSMENT
Intubated, asynchronous with mechanical ventilator, notable distress   Tmax 100.9, HR 96, /70 (22:00), O2 99%

## 2018-06-05 NOTE — PROVIDER CONTACT NOTE (EICU) - RECOMMENDATIONS
Increase sedation Precedex to 0.7mg (was on 0.4mg) with extra dose of versed 2mg IV once ordered.  Frequent suctioning and Tylenol for fever given.
per Dr. Restrepo requested Ketamine 1mg/kg IVP x1

## 2018-06-05 NOTE — PROGRESS NOTE ADULT - SUBJECTIVE AND OBJECTIVE BOX
Gastroenterology  Patient seen and examined bedside   Intubated and sedated      T(F): 101.6 (06-05-18 @ 07:37), Max: 101.6 (06-05-18 @ 07:37)  HR: 90 (06-05-18 @ 07:30) (74 - 105)  BP: 118/70 (06-05-18 @ 07:00) (84/46 - 132/85)  RR: 18 (06-05-18 @ 07:00) (11 - 24)  SpO2: 100% (06-05-18 @ 07:30) (63% - 100%)  Wt(kg): --  CAPILLARY BLOOD GLUCOSE      PHYSICAL EXAM:  General: Sedated  HEENT: NCAT, NGT, intubated  CV: +S1+S2 regular rate and rhythm  Lung: bilateral rhonchi  Abdomen: soft, Non Tender No distention Normal active BS  Extremities: no cyanosis, clubbing or edema    LABS:                        11.6   8.96  )-----------( 398      ( 05 Jun 2018 03:57 )             36.6     06-05    142  |  107  |  10  ----------------------------<  129<H>  4.4   |  28  |  0.81    Ca    8.6      05 Jun 2018 03:57  Phos  1.5     06-05  Mg     2.5     06-05    TPro  7.9  /  Alb  2.6<L>  /  TBili  0.2  /  DBili  x   /  AST  19  /  ALT  22  /  AlkPhos  44  06-04    LIVER FUNCTIONS - ( 04 Jun 2018 04:23 )  Alb: 2.6 g/dL / Pro: 7.9 gm/dL / ALK PHOS: 44 U/L / ALT: 22 U/L / AST: 19 U/L / GGT: x             I&O's Detail    04 Jun 2018 07:01  -  05 Jun 2018 07:00  --------------------------------------------------------  IN:    Free Water: 250 mL    IV PiggyBack: 200 mL    Vital HN: 1190 mL  Total IN: 1640 mL    OUT:    Intermittent Catheterization - Urethral: 1100 mL    Voided: 700 mL  Total OUT: 1800 mL    Total NET: -160 mL        06-05 @ 03:57    142 | 107 | 10  /8.6 | 2.5 | 1.5  _______________________/  4.4 | 28 | 0.81                           \par

## 2018-06-06 LAB
-  AMIKACIN: SIGNIFICANT CHANGE UP
-  AZTREONAM: SIGNIFICANT CHANGE UP
-  CEFEPIME: SIGNIFICANT CHANGE UP
-  CEFTAZIDIME: SIGNIFICANT CHANGE UP
-  CIPROFLOXACIN: SIGNIFICANT CHANGE UP
-  GENTAMICIN: SIGNIFICANT CHANGE UP
-  IMIPENEM: SIGNIFICANT CHANGE UP
-  LEVOFLOXACIN: SIGNIFICANT CHANGE UP
-  MEROPENEM: SIGNIFICANT CHANGE UP
-  PIPERACILLIN/TAZOBACTAM: SIGNIFICANT CHANGE UP
-  TOBRAMYCIN: SIGNIFICANT CHANGE UP
ANION GAP SERPL CALC-SCNC: 8 MMOL/L — SIGNIFICANT CHANGE UP (ref 5–17)
BUN SERPL-MCNC: 12 MG/DL — SIGNIFICANT CHANGE UP (ref 7–23)
CALCIUM SERPL-MCNC: 8.5 MG/DL — SIGNIFICANT CHANGE UP (ref 8.5–10.1)
CHLORIDE SERPL-SCNC: 106 MMOL/L — SIGNIFICANT CHANGE UP (ref 96–108)
CO2 SERPL-SCNC: 26 MMOL/L — SIGNIFICANT CHANGE UP (ref 22–31)
CREAT SERPL-MCNC: 0.72 MG/DL — SIGNIFICANT CHANGE UP (ref 0.5–1.3)
CULTURE RESULTS: NO GROWTH — SIGNIFICANT CHANGE UP
CULTURE RESULTS: SIGNIFICANT CHANGE UP
GLUCOSE SERPL-MCNC: 118 MG/DL — HIGH (ref 70–99)
GRAM STN FLD: SIGNIFICANT CHANGE UP
HCT VFR BLD CALC: 36.9 % — LOW (ref 39–50)
HGB BLD-MCNC: 11.6 G/DL — LOW (ref 13–17)
MAGNESIUM SERPL-MCNC: 2.5 MG/DL — SIGNIFICANT CHANGE UP (ref 1.6–2.6)
MCHC RBC-ENTMCNC: 27.8 PG — SIGNIFICANT CHANGE UP (ref 27–34)
MCHC RBC-ENTMCNC: 31.4 GM/DL — LOW (ref 32–36)
MCV RBC AUTO: 88.5 FL — SIGNIFICANT CHANGE UP (ref 80–100)
METHOD TYPE: SIGNIFICANT CHANGE UP
NRBC # BLD: 0 /100 WBCS — SIGNIFICANT CHANGE UP (ref 0–0)
ORGANISM # SPEC MICROSCOPIC CNT: SIGNIFICANT CHANGE UP
ORGANISM # SPEC MICROSCOPIC CNT: SIGNIFICANT CHANGE UP
PHOSPHATE SERPL-MCNC: 1.8 MG/DL — LOW (ref 2.5–4.5)
PLATELET # BLD AUTO: 459 K/UL — HIGH (ref 150–400)
POTASSIUM SERPL-MCNC: 3.9 MMOL/L — SIGNIFICANT CHANGE UP (ref 3.5–5.3)
POTASSIUM SERPL-SCNC: 3.9 MMOL/L — SIGNIFICANT CHANGE UP (ref 3.5–5.3)
RBC # BLD: 4.17 M/UL — LOW (ref 4.2–5.8)
RBC # FLD: 13.5 % — SIGNIFICANT CHANGE UP (ref 10.3–14.5)
SODIUM SERPL-SCNC: 140 MMOL/L — SIGNIFICANT CHANGE UP (ref 135–145)
SPECIMEN SOURCE: SIGNIFICANT CHANGE UP
SPECIMEN SOURCE: SIGNIFICANT CHANGE UP
WBC # BLD: 8.09 K/UL — SIGNIFICANT CHANGE UP (ref 3.8–10.5)
WBC # FLD AUTO: 8.09 K/UL — SIGNIFICANT CHANGE UP (ref 3.8–10.5)

## 2018-06-06 PROCEDURE — 71045 X-RAY EXAM CHEST 1 VIEW: CPT | Mod: 26

## 2018-06-06 PROCEDURE — 99291 CRITICAL CARE FIRST HOUR: CPT

## 2018-06-06 RX ORDER — POTASSIUM PHOSPHATE, MONOBASIC POTASSIUM PHOSPHATE, DIBASIC 236; 224 MG/ML; MG/ML
15 INJECTION, SOLUTION INTRAVENOUS ONCE
Qty: 0 | Refills: 0 | Status: COMPLETED | OUTPATIENT
Start: 2018-06-06 | End: 2018-06-06

## 2018-06-06 RX ORDER — PIPERACILLIN AND TAZOBACTAM 4; .5 G/20ML; G/20ML
3.38 INJECTION, POWDER, LYOPHILIZED, FOR SOLUTION INTRAVENOUS EVERY 8 HOURS
Qty: 0 | Refills: 0 | Status: DISCONTINUED | OUTPATIENT
Start: 2018-06-06 | End: 2018-06-09

## 2018-06-06 RX ORDER — PROPOFOL 10 MG/ML
5 INJECTION, EMULSION INTRAVENOUS
Qty: 1000 | Refills: 0 | Status: DISCONTINUED | OUTPATIENT
Start: 2018-06-06 | End: 2018-06-08

## 2018-06-06 RX ORDER — FENTANYL CITRATE 50 UG/ML
100 INJECTION INTRAVENOUS ONCE
Qty: 0 | Refills: 0 | Status: DISCONTINUED | OUTPATIENT
Start: 2018-06-06 | End: 2018-06-06

## 2018-06-06 RX ORDER — FENTANYL CITRATE 50 UG/ML
0.5 INJECTION INTRAVENOUS
Qty: 2500 | Refills: 0 | Status: DISCONTINUED | OUTPATIENT
Start: 2018-06-06 | End: 2018-06-08

## 2018-06-06 RX ADMIN — Medication 1 GRAM(S): at 17:22

## 2018-06-06 RX ADMIN — FENTANYL CITRATE 4 MICROGRAM(S)/KG/HR: 50 INJECTION INTRAVENOUS at 20:00

## 2018-06-06 RX ADMIN — Medication 1 MILLIGRAM(S): at 12:22

## 2018-06-06 RX ADMIN — AMPICILLIN SODIUM AND SULBACTAM SODIUM 200 GRAM(S): 250; 125 INJECTION, POWDER, FOR SUSPENSION INTRAMUSCULAR; INTRAVENOUS at 12:24

## 2018-06-06 RX ADMIN — FENTANYL CITRATE 100 MICROGRAM(S): 50 INJECTION INTRAVENOUS at 20:25

## 2018-06-06 RX ADMIN — CHLORHEXIDINE GLUCONATE 15 MILLILITER(S): 213 SOLUTION TOPICAL at 05:20

## 2018-06-06 RX ADMIN — FENTANYL CITRATE 100 MICROGRAM(S): 50 INJECTION INTRAVENOUS at 20:05

## 2018-06-06 RX ADMIN — PROPOFOL 2.4 MICROGRAM(S)/KG/MIN: 10 INJECTION, EMULSION INTRAVENOUS at 10:08

## 2018-06-06 RX ADMIN — Medication 1 GRAM(S): at 12:23

## 2018-06-06 RX ADMIN — CHLORHEXIDINE GLUCONATE 15 MILLILITER(S): 213 SOLUTION TOPICAL at 17:19

## 2018-06-06 RX ADMIN — POTASSIUM PHOSPHATE, MONOBASIC POTASSIUM PHOSPHATE, DIBASIC 63.75 MILLIMOLE(S): 236; 224 INJECTION, SOLUTION INTRAVENOUS at 10:07

## 2018-06-06 RX ADMIN — AMPICILLIN SODIUM AND SULBACTAM SODIUM 200 GRAM(S): 250; 125 INJECTION, POWDER, FOR SUSPENSION INTRAMUSCULAR; INTRAVENOUS at 17:21

## 2018-06-06 RX ADMIN — PANTOPRAZOLE SODIUM 40 MILLIGRAM(S): 20 TABLET, DELAYED RELEASE ORAL at 12:22

## 2018-06-06 RX ADMIN — Medication 1 GRAM(S): at 05:20

## 2018-06-06 RX ADMIN — PROPOFOL 2.4 MICROGRAM(S)/KG/MIN: 10 INJECTION, EMULSION INTRAVENOUS at 03:30

## 2018-06-06 RX ADMIN — Medication 1 TABLET(S): at 12:22

## 2018-06-06 RX ADMIN — Medication 100 MILLIGRAM(S): at 12:22

## 2018-06-06 RX ADMIN — ENOXAPARIN SODIUM 40 MILLIGRAM(S): 100 INJECTION SUBCUTANEOUS at 12:23

## 2018-06-06 RX ADMIN — CHLORHEXIDINE GLUCONATE 1 APPLICATION(S): 213 SOLUTION TOPICAL at 05:20

## 2018-06-06 RX ADMIN — AMPICILLIN SODIUM AND SULBACTAM SODIUM 200 GRAM(S): 250; 125 INJECTION, POWDER, FOR SUSPENSION INTRAMUSCULAR; INTRAVENOUS at 05:19

## 2018-06-06 NOTE — PROGRESS NOTE ADULT - SUBJECTIVE AND OBJECTIVE BOX
HPI:  Pt is a 54 yo M with h/o HLD, GERD and ETOH abuse with prior admission 2 to ETOH w/drawal. Pt presented 2 to c/o tremors, abdominal pain, nausea, and body aches. Pt states he stopped drinking  and he feels agitated, anxious and sweating like his typical withdrawal. Pt admitted to [facility] [place] and RRT/ code gray called 2 to increased agitation. Admitting dx 1) ETOH abuse with w/drawal/DTs 2) Hypokalemia. Since admission pt with worsening ETOH withdrawal/DTs/ encephalopathy, copious oral secretions requiring frequent suctioning and desaturating on nasal cannula. Sputum (+) for Staph although no sign infiltrate on CXR: tracheobronchitis.  pt desat'ing and obtunded; intubated       ## Labs:  CBC:                        11.6   8.09  )-----------( 459      ( 2018 03:39 )             36.9     Chem:      140  |  106  |  12  ----------------------------<  118<H>  3.9   |  26  |  0.72    Ca    8.5      2018 03:39  Phos  1.8       Mg     2.5           Coags:    culture blood:  --   @ 17:22            culture sputum:     Numerous Pseudomonas aeruginosa  Normal Respiratory Melanie present           culture urine:  --  @ 17:22      ## Imaging:    ## Medications:  ampicillin/sulbactam  IVPB 3 Gram(s) IV Intermittent every 6 hours      enoxaparin Injectable 40 milliGRAM(s) SubCutaneous daily    pantoprazole   Suspension 40 milliGRAM(s) Oral before breakfast  polyethylene glycol 3350 17 Gram(s) Oral daily PRN  sucralfate suspension 1 Gram(s) Oral every 6 hours    acetaminophen  Suppository 650 milliGRAM(s) Rectal every 6 hours PRN  dexmedetomidine Infusion 0.1 MICROgram(s)/kG/Hr IV Continuous <Continuous>  propofol Infusion 5 MICROgram(s)/kG/Min IV Continuous <Continuous>      ## Vitals:  T(C): 38.4 (18 @ 07:24), Max: 38.4 (18 @ 07:24)  HR: 81 (18 @ 12:00) (56 - 97)  BP: 118/74 (18 @ 12:00) (88/50 - 133/85)  BP(mean): 84 (18 @ 12:00) (59 - 95)  RR: 17 (18 @ 12:00) (12 - 22)  SpO2: 100% (18 @ 12:00) (97% - 100%)  Wt(kg): --  Vent: Mode: AC/ CMV (Assist Control/ Continuous Mandatory Ventilation), RR (machine): 14, RR (patient): 19, TV (machine): 500, FiO2: 30, PEEP: 5, PIP: 20  AB-05 @ 07:01  -   @ 07:00  --------------------------------------------------------  IN: 1514.8 mL / OUT: 1135 mL / NET: 379.8 mL     @ 07:01  -   @ 14:22  --------------------------------------------------------  IN: 283.6 mL / OUT: 900 mL / NET: -616.4 mL    ## P/E:    Mouth: (+) ETT  Lungs: CTA  Heart: RRR  Abd: Soft/+BS  Ext: UE edema  Neuro: Pt awake, slightly agitated and not following commands    CENTRAL LINE: [ ] YES [x ] NO  LOCATION:   DATE INSERTED:  REMOVE: [ ] YES [ ] NO      CHAN: [x ] YES [ ] NO    DATE INSERTED:  REMOVE:  [ ] YES [ ] NO      A-LINE:  [ ] YES [x ] NO  LOCATION:   DATE INSERTED:  REMOVE:  [ ] YES [ ] NO  EXPLAIN:    CODE STATUS: [x ] full code  [ ] DNR  [ ] DNI  [ ] [facility]  Goals of care discussion: [ ] yes

## 2018-06-06 NOTE — PROGRESS NOTE ADULT - ASSESSMENT
Pt is a 54 yo M with h/o HLD, GERD and ETOH abuse with prior admission 2 to ETOH w/drawal. Pt presented 2 to c/o tremors, abdominal pain, nausea, and body aches. Pt states he stopped drinking 5/24 and he feels agitated, anxious and sweating like his typical withdrawal. Pt admitted to [facility] and RRT/ code gray called 2 to increased agitation. Admitting dx 1) ETOH abuse with w/drawal/DTs 2) Hypokalemia. Since admission pt with worsening ETOH withdrawal/DTs/ encephalopathy, copious oral secretions requiring frequent suctioning and desaturating on nasal cannula. Sputum (+) for Staph although no sign infiltrate on CXR: tracheobronchitis. 6/4 pt desat'ing and obtunded; intubated     Resp: Was weaning well but MS was not  ID: Finish course of Abx (will check total days)  FEN: Cont enteral feeds and daily Thiamine, MVI and Folate/ Replace Phos pt hypophosphatemic  Endo: Follow Glu  Renal: Keep Smith pt had urinary retention  Neuro/Psych:Cont Propofol and later today another sedation holiday to assess MS    CCT: 35 min

## 2018-06-07 LAB
ANION GAP SERPL CALC-SCNC: 12 MMOL/L — SIGNIFICANT CHANGE UP (ref 5–17)
BUN SERPL-MCNC: 9 MG/DL — SIGNIFICANT CHANGE UP (ref 7–23)
CALCIUM SERPL-MCNC: 8.7 MG/DL — SIGNIFICANT CHANGE UP (ref 8.5–10.1)
CHLORIDE SERPL-SCNC: 105 MMOL/L — SIGNIFICANT CHANGE UP (ref 96–108)
CO2 SERPL-SCNC: 24 MMOL/L — SIGNIFICANT CHANGE UP (ref 22–31)
CREAT SERPL-MCNC: 0.64 MG/DL — SIGNIFICANT CHANGE UP (ref 0.5–1.3)
GLUCOSE SERPL-MCNC: 93 MG/DL — SIGNIFICANT CHANGE UP (ref 70–99)
HCT VFR BLD CALC: 34.8 % — LOW (ref 39–50)
HGB BLD-MCNC: 11 G/DL — LOW (ref 13–17)
MAGNESIUM SERPL-MCNC: 2.5 MG/DL — SIGNIFICANT CHANGE UP (ref 1.6–2.6)
MCHC RBC-ENTMCNC: 27.7 PG — SIGNIFICANT CHANGE UP (ref 27–34)
MCHC RBC-ENTMCNC: 31.6 GM/DL — LOW (ref 32–36)
MCV RBC AUTO: 87.7 FL — SIGNIFICANT CHANGE UP (ref 80–100)
NRBC # BLD: 0 /100 WBCS — SIGNIFICANT CHANGE UP (ref 0–0)
PHOSPHATE SERPL-MCNC: 4.2 MG/DL — SIGNIFICANT CHANGE UP (ref 2.5–4.5)
PLATELET # BLD AUTO: 528 K/UL — HIGH (ref 150–400)
POTASSIUM SERPL-MCNC: 4.1 MMOL/L — SIGNIFICANT CHANGE UP (ref 3.5–5.3)
POTASSIUM SERPL-SCNC: 4.1 MMOL/L — SIGNIFICANT CHANGE UP (ref 3.5–5.3)
RBC # BLD: 3.97 M/UL — LOW (ref 4.2–5.8)
RBC # FLD: 13.5 % — SIGNIFICANT CHANGE UP (ref 10.3–14.5)
SODIUM SERPL-SCNC: 141 MMOL/L — SIGNIFICANT CHANGE UP (ref 135–145)
WBC # BLD: 5.95 K/UL — SIGNIFICANT CHANGE UP (ref 3.8–10.5)
WBC # FLD AUTO: 5.95 K/UL — SIGNIFICANT CHANGE UP (ref 3.8–10.5)

## 2018-06-07 PROCEDURE — 71250 CT THORAX DX C-: CPT | Mod: 26

## 2018-06-07 PROCEDURE — 71045 X-RAY EXAM CHEST 1 VIEW: CPT | Mod: 26,59

## 2018-06-07 PROCEDURE — 99291 CRITICAL CARE FIRST HOUR: CPT

## 2018-06-07 RX ORDER — SODIUM CHLORIDE 9 MG/ML
1000 INJECTION INTRAMUSCULAR; INTRAVENOUS; SUBCUTANEOUS ONCE
Qty: 0 | Refills: 0 | Status: COMPLETED | OUTPATIENT
Start: 2018-06-07 | End: 2018-06-07

## 2018-06-07 RX ADMIN — PROPOFOL 2.4 MICROGRAM(S)/KG/MIN: 10 INJECTION, EMULSION INTRAVENOUS at 15:44

## 2018-06-07 RX ADMIN — Medication 1 GRAM(S): at 00:41

## 2018-06-07 RX ADMIN — Medication 100 MILLIGRAM(S): at 12:56

## 2018-06-07 RX ADMIN — SODIUM CHLORIDE 1000 MILLILITER(S): 9 INJECTION INTRAMUSCULAR; INTRAVENOUS; SUBCUTANEOUS at 05:00

## 2018-06-07 RX ADMIN — PROPOFOL 2.4 MICROGRAM(S)/KG/MIN: 10 INJECTION, EMULSION INTRAVENOUS at 00:00

## 2018-06-07 RX ADMIN — ENOXAPARIN SODIUM 40 MILLIGRAM(S): 100 INJECTION SUBCUTANEOUS at 12:56

## 2018-06-07 RX ADMIN — Medication 1 GRAM(S): at 12:56

## 2018-06-07 RX ADMIN — DEXMEDETOMIDINE HYDROCHLORIDE IN 0.9% SODIUM CHLORIDE 2 MICROGRAM(S)/KG/HR: 4 INJECTION INTRAVENOUS at 11:39

## 2018-06-07 RX ADMIN — PIPERACILLIN AND TAZOBACTAM 25 GRAM(S): 4; .5 INJECTION, POWDER, LYOPHILIZED, FOR SOLUTION INTRAVENOUS at 14:08

## 2018-06-07 RX ADMIN — DEXMEDETOMIDINE HYDROCHLORIDE IN 0.9% SODIUM CHLORIDE 2 MICROGRAM(S)/KG/HR: 4 INJECTION INTRAVENOUS at 00:00

## 2018-06-07 RX ADMIN — Medication 1 MILLIGRAM(S): at 12:56

## 2018-06-07 RX ADMIN — Medication 1 TABLET(S): at 12:56

## 2018-06-07 RX ADMIN — DEXMEDETOMIDINE HYDROCHLORIDE IN 0.9% SODIUM CHLORIDE 2 MICROGRAM(S)/KG/HR: 4 INJECTION INTRAVENOUS at 08:07

## 2018-06-07 RX ADMIN — PROPOFOL 2.4 MICROGRAM(S)/KG/MIN: 10 INJECTION, EMULSION INTRAVENOUS at 09:23

## 2018-06-07 RX ADMIN — Medication 1 GRAM(S): at 17:17

## 2018-06-07 RX ADMIN — CHLORHEXIDINE GLUCONATE 1 APPLICATION(S): 213 SOLUTION TOPICAL at 06:26

## 2018-06-07 RX ADMIN — Medication 1 GRAM(S): at 06:26

## 2018-06-07 RX ADMIN — PANTOPRAZOLE SODIUM 40 MILLIGRAM(S): 20 TABLET, DELAYED RELEASE ORAL at 12:55

## 2018-06-07 RX ADMIN — PIPERACILLIN AND TAZOBACTAM 25 GRAM(S): 4; .5 INJECTION, POWDER, LYOPHILIZED, FOR SOLUTION INTRAVENOUS at 00:41

## 2018-06-07 RX ADMIN — PIPERACILLIN AND TAZOBACTAM 25 GRAM(S): 4; .5 INJECTION, POWDER, LYOPHILIZED, FOR SOLUTION INTRAVENOUS at 06:26

## 2018-06-07 RX ADMIN — PIPERACILLIN AND TAZOBACTAM 25 GRAM(S): 4; .5 INJECTION, POWDER, LYOPHILIZED, FOR SOLUTION INTRAVENOUS at 22:01

## 2018-06-07 RX ADMIN — PROPOFOL 2.4 MICROGRAM(S)/KG/MIN: 10 INJECTION, EMULSION INTRAVENOUS at 22:02

## 2018-06-07 RX ADMIN — CHLORHEXIDINE GLUCONATE 15 MILLILITER(S): 213 SOLUTION TOPICAL at 17:17

## 2018-06-07 RX ADMIN — CHLORHEXIDINE GLUCONATE 15 MILLILITER(S): 213 SOLUTION TOPICAL at 06:47

## 2018-06-07 NOTE — PROVIDER CONTACT NOTE (EICU) - BACKGROUND
53 year old male with Hx of ETOH abuse presented with symptoms of withdrawal.
54 yo male with h/o EtOH abuse admitted with withdrawal requiring intubation
as above

## 2018-06-07 NOTE — PROGRESS NOTE ADULT - ASSESSMENT
Pt is a 52 yo M with h/o HLD, GERD and ETOH abuse with prior admission 2 to ETOH w/drawal. Pt presented 2 to c/o tremors, abdominal pain, nausea, and body aches. Pt states he stopped drinking 5/24 and he feels agitated, anxious and sweating like his typical withdrawal. Pt admitted to [facility] and RRT/ code gray called 2 to increased agitation. Admitting dx 1) ETOH abuse with w/drawal/DTs 2) Hypokalemia. Since admission pt with worsening ETOH withdrawal/DTs/ encephalopathy, copious oral secretions requiring frequent suctioning and desaturating on nasal cannula. Sputum (+) for Staph although no sign infiltrate on CXR: tracheobronchitis. 6/4 pt desat'ing and obtunded; intubated. Pt with L deep sulcus sign on CXR but CT chest no pnmotx     Resp: Daily SBT, will not extubate until MS calm  ID: Abx changed to Zosyn 2 to Pseudomonas in the sputum  FEN: Cont enteral feeds and daily Thiamine, MVI and Folate  Endo: Follow Glu  Renal: Keep Smith pt had urinary retention  Neuro/Psych:Cont Cont Precedex + Propofol and dc Fentanyl if possible    CCT: 35 min

## 2018-06-07 NOTE — PROVIDER CONTACT NOTE (EICU) - ASSESSMENT
Vital signs HR 59 BP 77/48 RR 14 Spo2 100% Vital signs HR 59 BP 77/48 RR 14 Spo2 100%  Calm, sedated on mechanical ventilator

## 2018-06-07 NOTE — PROGRESS NOTE ADULT - SUBJECTIVE AND OBJECTIVE BOX
HPI:  Pt is a 54 yo M with h/o HLD, GERD and ETOH abuse with prior admission 2 to ETOH w/drawal. Pt presented 2 to c/o tremors, abdominal pain, nausea, and body aches. Pt states he stopped drinking  and he feels agitated, anxious and sweating like his typical withdrawal. Pt admitted to [facility] and RRT/ code gray called 2 to increased agitation. Admitting dx 1) ETOH abuse with w/drawal/DTs 2) Hypokalemia. Since admission pt with worsening ETOH withdrawal/DTs/ encephalopathy, copious oral secretions requiring frequent suctioning and desaturating on nasal cannula. Sputum (+) for Staph although no sign infiltrate on CXR: tracheobronchitis.  pt desat'ing and obtunded; intubated. Pt with L deep sulcus sign on CXR but CT chest no pnmotx      ## Labs:  CBC:                        11.0   5.95  )-----------( 528      ( 2018 04:00 )             34.8     Chem:      141  |  105  |  9   ----------------------------<  93  4.1   |  24  |  0.64    Ca    8.7      2018 04:00  Phos  4.2     06-07  Mg     2.5     06-07      Coags:    culture blood:  --   @ 15:27            culture sputum:     No growth to date.           culture urine:  --  @ 15:27        ## Imaging:    ## Medications:  piperacillin/tazobactam IVPB. 3.375 Gram(s) IV Intermittent every 8 hours          enoxaparin Injectable 40 milliGRAM(s) SubCutaneous daily    pantoprazole   Suspension 40 milliGRAM(s) Oral before breakfast  polyethylene glycol 3350 17 Gram(s) Oral daily PRN  sucralfate suspension 1 Gram(s) Oral every 6 hours    acetaminophen  Suppository 650 milliGRAM(s) Rectal every 6 hours PRN  dexmedetomidine Infusion 0.1 MICROgram(s)/kG/Hr IV Continuous <Continuous>  fentaNYL   Infusion. 0.5 MICROgram(s)/kG/Hr IV Continuous <Continuous>  propofol Infusion 5 MICROgram(s)/kG/Min IV Continuous <Continuous>      ## Vitals:  T(C): 36.9 (18 @ 05:22), Max: 37.8 (18 @ 19:42)  HR: 55 (18 @ 13:58) (51 - 99)  BP: 107/70 (18 @ 07:00) (74/50 - 166/148)  BP(mean): 79 (18 @ 07:00) (55 - 152)  RR: 15 (18 @ 07:08) (13 - 22)  SpO2: 98% (18 @ 13:58) (98% - 100%)  Wt(kg): --  Vent: Mode: AC/ CMV (Assist Control/ Continuous Mandatory Ventilation), RR (machine): 14, RR (patient): 14, TV (machine): 500, FiO2: 30, PEEP: 5, PIP: 19  AB-06 @ 07:01  -   @ 07:00  --------------------------------------------------------  IN: 4460.4 mL / OUT: 1715 mL / NET: 2745.4 mL          ## P/E:  Gen: lying comfortably in bed in no apparent distress  Mouth:   Neck:  Lungs:   Heart:   Abd:  Ext:  Neuro:    CENTRAL LINE: [ ] YES [ ] NO  LOCATION:   DATE INSERTED:  REMOVE: [ ] YES [ ] NO      CHAN: [x ] YES [ ] NO    DATE INSERTED:  REMOVE:  [ ] YES [ ] NO      A-LINE:  [ ] YES [x ] NO  LOCATION:   DATE INSERTED:  REMOVE:  [ ] YES [ ] NO  EXPLAIN:      CODE STATUS: [x ] full code  [ ] DNR  [ ] DNI  [ ] [facility]  Goals of care discussion: [ ] yes HPI:  Pt is a 52 yo M with h/o HLD, GERD and ETOH abuse with prior admission 2 to ETOH w/drawal. Pt presented 2 to c/o tremors, abdominal pain, nausea, and body aches. Pt states he stopped drinking  and he feels agitated, anxious and sweating like his typical withdrawal. Pt admitted to [facility] and RRT/ code gray called 2 to increased agitation. Admitting dx 1) ETOH abuse with w/drawal/DTs 2) Hypokalemia. Since admission pt with worsening ETOH withdrawal/DTs/ encephalopathy, copious oral secretions requiring frequent suctioning and desaturating on nasal cannula. Sputum (+) for Staph although no sign infiltrate on CXR: tracheobronchitis.  pt desat'ing and obtunded; intubated. Pt with L deep sulcus sign on CXR but CT chest no pnmotx      ## Labs:  CBC:                        11.0   5.95  )-----------( 528      ( 2018 04:00 )             34.8     Chem:      141  |  105  |  9   ----------------------------<  93  4.1   |  24  |  0.64    Ca    8.7      2018 04:00  Phos  4.2     06-07  Mg     2.5     06-07      Coags:    culture blood:  --   @ 15:27            culture sputum:     No growth to date.           culture urine:  --  @ 15:27        ## Imaging:    ## Medications:  piperacillin/tazobactam IVPB. 3.375 Gram(s) IV Intermittent every 8 hours          enoxaparin Injectable 40 milliGRAM(s) SubCutaneous daily    pantoprazole   Suspension 40 milliGRAM(s) Oral before breakfast  polyethylene glycol 3350 17 Gram(s) Oral daily PRN  sucralfate suspension 1 Gram(s) Oral every 6 hours    acetaminophen  Suppository 650 milliGRAM(s) Rectal every 6 hours PRN  dexmedetomidine Infusion 0.1 MICROgram(s)/kG/Hr IV Continuous <Continuous>  fentaNYL   Infusion. 0.5 MICROgram(s)/kG/Hr IV Continuous <Continuous>  propofol Infusion 5 MICROgram(s)/kG/Min IV Continuous <Continuous>      ## Vitals:  T(C): 36.9 (18 @ 05:22), Max: 37.8 (18 @ 19:42)  HR: 55 (18 @ 13:58) (51 - 99)  BP: 107/70 (18 @ 07:00) (74/50 - 166/148)  BP(mean): 79 (18 @ 07:00) (55 - 152)  RR: 15 (18 @ 07:08) (13 - 22)  SpO2: 98% (18 @ 13:58) (98% - 100%)  Wt(kg): --  Vent: Mode: AC/ CMV (Assist Control/ Continuous Mandatory Ventilation), RR (machine): 14, RR (patient): 14, TV (machine): 500, FiO2: 30, PEEP: 5, PIP: 19  AB-06 @ 07:01  -   @ 07:00  --------------------------------------------------------  IN: 4460.4 mL / OUT: 1715 mL / NET: 2745.4 mL          ## P/E:  Gen: lying comfortably in bed in no apparent distress  Mouth: (+) ETT  Lungs: CTA  Heart: Alvarado  Abd: Soft/+BS  Ext: UE edema  Neuro: Sedated    CENTRAL LINE: [ ] YES [ ] NO  LOCATION:   DATE INSERTED:  REMOVE: [ ] YES [ ] NO      CHAN: [x ] YES [ ] NO    DATE INSERTED:  REMOVE:  [ ] YES [ ] NO      A-LINE:  [ ] YES [x ] NO  LOCATION:   DATE INSERTED:  REMOVE:  [ ] YES [ ] NO  EXPLAIN:      CODE STATUS: [x ] full code  [ ] DNR  [ ] DNI  [ ] MOL  Goals of care discussion: [ ] yes

## 2018-06-07 NOTE — PROVIDER CONTACT NOTE (EICU) - ACTION/TREATMENT ORDERED:
Bolus 1 L NS and monitor response in BP  Discussed with eicu intensivist Bolus 1 L NS and monitor response in BP  Contact provider should BP fail to respond    Discussed with eicu intensivist and bedside rn  Bedside provider aware

## 2018-06-07 NOTE — PROVIDER CONTACT NOTE (EICU) - SITUATION
Contacted by bedside nurse for order of versed 2mg requested by bedside provider.
Pt became agitated requiring increased fentanyl for analgosedation, he subsequently developed hypotension (77/48)
a/w etoh w/d now in DTs

## 2018-06-08 LAB
ALBUMIN SERPL ELPH-MCNC: 2.3 G/DL — LOW (ref 3.3–5)
ALP SERPL-CCNC: 34 U/L — LOW (ref 40–120)
ALT FLD-CCNC: 29 U/L — SIGNIFICANT CHANGE UP (ref 12–78)
ANION GAP SERPL CALC-SCNC: 10 MMOL/L — SIGNIFICANT CHANGE UP (ref 5–17)
AST SERPL-CCNC: 27 U/L — SIGNIFICANT CHANGE UP (ref 15–37)
BILIRUB SERPL-MCNC: 0.1 MG/DL — LOW (ref 0.2–1.2)
BUN SERPL-MCNC: 12 MG/DL — SIGNIFICANT CHANGE UP (ref 7–23)
CALCIUM SERPL-MCNC: 8.7 MG/DL — SIGNIFICANT CHANGE UP (ref 8.5–10.1)
CHLORIDE SERPL-SCNC: 106 MMOL/L — SIGNIFICANT CHANGE UP (ref 96–108)
CO2 SERPL-SCNC: 26 MMOL/L — SIGNIFICANT CHANGE UP (ref 22–31)
CREAT SERPL-MCNC: 0.73 MG/DL — SIGNIFICANT CHANGE UP (ref 0.5–1.3)
GLUCOSE SERPL-MCNC: 89 MG/DL — SIGNIFICANT CHANGE UP (ref 70–99)
HCT VFR BLD CALC: 35.6 % — LOW (ref 39–50)
HGB BLD-MCNC: 10.8 G/DL — LOW (ref 13–17)
MAGNESIUM SERPL-MCNC: 2.6 MG/DL — SIGNIFICANT CHANGE UP (ref 1.6–2.6)
MCHC RBC-ENTMCNC: 27.3 PG — SIGNIFICANT CHANGE UP (ref 27–34)
MCHC RBC-ENTMCNC: 30.3 GM/DL — LOW (ref 32–36)
MCV RBC AUTO: 89.9 FL — SIGNIFICANT CHANGE UP (ref 80–100)
NRBC # BLD: 0 /100 WBCS — SIGNIFICANT CHANGE UP (ref 0–0)
PHOSPHATE SERPL-MCNC: 3.3 MG/DL — SIGNIFICANT CHANGE UP (ref 2.5–4.5)
PLATELET # BLD AUTO: 582 K/UL — HIGH (ref 150–400)
POTASSIUM SERPL-MCNC: 4.1 MMOL/L — SIGNIFICANT CHANGE UP (ref 3.5–5.3)
POTASSIUM SERPL-SCNC: 4.1 MMOL/L — SIGNIFICANT CHANGE UP (ref 3.5–5.3)
PROT SERPL-MCNC: 7.2 GM/DL — SIGNIFICANT CHANGE UP (ref 6–8.3)
RBC # BLD: 3.96 M/UL — LOW (ref 4.2–5.8)
RBC # FLD: 13.7 % — SIGNIFICANT CHANGE UP (ref 10.3–14.5)
SODIUM SERPL-SCNC: 142 MMOL/L — SIGNIFICANT CHANGE UP (ref 135–145)
WBC # BLD: 4.17 K/UL — SIGNIFICANT CHANGE UP (ref 3.8–10.5)
WBC # FLD AUTO: 4.17 K/UL — SIGNIFICANT CHANGE UP (ref 3.8–10.5)

## 2018-06-08 PROCEDURE — 99291 CRITICAL CARE FIRST HOUR: CPT

## 2018-06-08 PROCEDURE — 71045 X-RAY EXAM CHEST 1 VIEW: CPT | Mod: 26

## 2018-06-08 RX ORDER — HALOPERIDOL DECANOATE 100 MG/ML
2.5 INJECTION INTRAMUSCULAR EVERY 6 HOURS
Qty: 0 | Refills: 0 | Status: DISCONTINUED | OUTPATIENT
Start: 2018-06-08 | End: 2018-06-11

## 2018-06-08 RX ORDER — QUETIAPINE FUMARATE 200 MG/1
25 TABLET, FILM COATED ORAL AT BEDTIME
Qty: 0 | Refills: 0 | Status: DISCONTINUED | OUTPATIENT
Start: 2018-06-08 | End: 2018-06-11

## 2018-06-08 RX ADMIN — Medication 1 GRAM(S): at 12:55

## 2018-06-08 RX ADMIN — Medication 1 GRAM(S): at 23:44

## 2018-06-08 RX ADMIN — CHLORHEXIDINE GLUCONATE 1 APPLICATION(S): 213 SOLUTION TOPICAL at 05:58

## 2018-06-08 RX ADMIN — PANTOPRAZOLE SODIUM 40 MILLIGRAM(S): 20 TABLET, DELAYED RELEASE ORAL at 12:55

## 2018-06-08 RX ADMIN — Medication 1 GRAM(S): at 00:40

## 2018-06-08 RX ADMIN — DEXMEDETOMIDINE HYDROCHLORIDE IN 0.9% SODIUM CHLORIDE 2 MICROGRAM(S)/KG/HR: 4 INJECTION INTRAVENOUS at 00:40

## 2018-06-08 RX ADMIN — ENOXAPARIN SODIUM 40 MILLIGRAM(S): 100 INJECTION SUBCUTANEOUS at 12:56

## 2018-06-08 RX ADMIN — Medication 1 TABLET(S): at 12:55

## 2018-06-08 RX ADMIN — HALOPERIDOL DECANOATE 2.5 MILLIGRAM(S): 100 INJECTION INTRAMUSCULAR at 22:15

## 2018-06-08 RX ADMIN — Medication 1 GRAM(S): at 18:01

## 2018-06-08 RX ADMIN — PIPERACILLIN AND TAZOBACTAM 25 GRAM(S): 4; .5 INJECTION, POWDER, LYOPHILIZED, FOR SOLUTION INTRAVENOUS at 15:24

## 2018-06-08 RX ADMIN — Medication 100 MILLIGRAM(S): at 12:55

## 2018-06-08 RX ADMIN — CHLORHEXIDINE GLUCONATE 15 MILLILITER(S): 213 SOLUTION TOPICAL at 05:58

## 2018-06-08 RX ADMIN — DEXMEDETOMIDINE HYDROCHLORIDE IN 0.9% SODIUM CHLORIDE 2 MICROGRAM(S)/KG/HR: 4 INJECTION INTRAVENOUS at 15:00

## 2018-06-08 RX ADMIN — PIPERACILLIN AND TAZOBACTAM 25 GRAM(S): 4; .5 INJECTION, POWDER, LYOPHILIZED, FOR SOLUTION INTRAVENOUS at 21:24

## 2018-06-08 RX ADMIN — Medication 1 MILLIGRAM(S): at 12:55

## 2018-06-08 RX ADMIN — PIPERACILLIN AND TAZOBACTAM 25 GRAM(S): 4; .5 INJECTION, POWDER, LYOPHILIZED, FOR SOLUTION INTRAVENOUS at 05:58

## 2018-06-08 RX ADMIN — QUETIAPINE FUMARATE 25 MILLIGRAM(S): 200 TABLET, FILM COATED ORAL at 21:24

## 2018-06-08 RX ADMIN — DEXMEDETOMIDINE HYDROCHLORIDE IN 0.9% SODIUM CHLORIDE 2 MICROGRAM(S)/KG/HR: 4 INJECTION INTRAVENOUS at 05:30

## 2018-06-08 RX ADMIN — Medication 1 GRAM(S): at 05:58

## 2018-06-08 RX ADMIN — PROPOFOL 2.4 MICROGRAM(S)/KG/MIN: 10 INJECTION, EMULSION INTRAVENOUS at 03:50

## 2018-06-08 NOTE — PROGRESS NOTE ADULT - SUBJECTIVE AND OBJECTIVE BOX
HPI:  Pt is a 54 yo M with h/o HLD, GERD and ETOH abuse with prior admission 2 to ETOH w/drawal. Pt presented 2 to c/o tremors, abdominal pain, nausea, and body aches. Pt states he stopped drinking  and he feels agitated, anxious and sweating like his typical withdrawal. Pt admitted to [facility] and RRT/ code gray called 2 to increased agitation. Admitting dx 1) ETOH abuse with w/drawal/DTs 2) Hypokalemia. Since admission pt with worsening ETOH withdrawal/DTs/ encephalopathy, copious oral secretions requiring frequent suctioning and desaturating on nasal cannula. Sputum (+) for Staph although no sign infiltrate on CXR: tracheobronchitis.  pt desat'ing and obtunded; intubated. On  pt with L deep sulcus sign on CXR but CT chest no pnmotx       ## Labs:  CBC:                        10.8   4.17  )-----------( 582      ( 2018 04:16 )             35.6     Chem:      142  |  106  |  12  ----------------------------<  89  4.1   |  26  |  0.73    Ca    8.7      2018 04:16  Phos  3.3       Mg     2.6         TPro  7.2  /  Alb  2.3<L>  /  TBili  0.1<L>  /  DBili  x   /  AST  27  /  ALT  29  /  AlkPhos  34<L>      Coags:          ## Imaging:    ## Medications:  piperacillin/tazobactam IVPB. 3.375 Gram(s) IV Intermittent every 8 hours          enoxaparin Injectable 40 milliGRAM(s) SubCutaneous daily    pantoprazole   Suspension 40 milliGRAM(s) Oral before breakfast  polyethylene glycol 3350 17 Gram(s) Oral daily PRN  sucralfate suspension 1 Gram(s) Oral every 6 hours    acetaminophen  Suppository 650 milliGRAM(s) Rectal every 6 hours PRN  dexmedetomidine Infusion 0.1 MICROgram(s)/kG/Hr IV Continuous <Continuous>  propofol Infusion 5 MICROgram(s)/kG/Min IV Continuous <Continuous>      ## Vitals:  T(C): 37.4 (18 @ 11:34), Max: 37.9 (18 @ 03:18)  HR: 70 (18 @ 15:00) (50 - 94)  BP: 107/73 (18 @ 15:00) (87/56 - 140/91)  BP(mean): 81 (18 @ 15:00) (61 - 105)  RR: 14 (18 @ 15:00) (12 - 18)  SpO2: 99% (18 @ 15:00) (95% - 100%)  Wt(kg): --  Vent: Mode: CPAP with PS, RR (patient): 13, FiO2: 30, PEEP: 5, PS: 5, PIP: 11  AB-07 @ 07:01  -   @ 07:00  --------------------------------------------------------  IN: 1884 mL / OUT: 2145 mL / NET: -261 mL     @ 07:01  -   @ 15:39  --------------------------------------------------------  IN: 21.7 mL / OUT: 0 mL / NET: 21.7 mL          ## P/E:  Gen: lying comfortably in bed in no apparent distress  Mouth: (+) ETT  Lungs: CTA  Heart: RRR  Abd: Soft/+BS  Ext: UE edema  Neuro: Calm    CENTRAL LINE: [ ] YES [x ] NO  LOCATION:   DATE INSERTED:  REMOVE: [ ] YES [ ] NO      CHAN: [x ] YES [ ] NO    DATE INSERTED:  REMOVE:  [ ] YES [ ] NO      A-LINE:  [ ] YES [x ] NO  LOCATION:   DATE INSERTED:  REMOVE:  [ ] YES [ ] NO  EXPLAIN:    CODE STATUS: [x ] full code  [ ] DNR  [ ] DNI  [ ] MOLST  Goals of care discussion: [ ] yes

## 2018-06-08 NOTE — PROGRESS NOTE ADULT - ASSESSMENT
Pt is a 54 yo M with h/o HLD, GERD and ETOH abuse with prior admission 2 to ETOH w/drawal. Pt presented 2 to c/o tremors, abdominal pain, nausea, and body aches. Pt states he stopped drinking 5/24 and he feels agitated, anxious and sweating like his typical withdrawal. Pt admitted to [facility] and RRT/ code gray called 2 to increased agitation. Admitting dx 1) ETOH abuse with w/drawal/DTs 2) Hypokalemia. Since admission pt with worsening ETOH withdrawal/DTs/ encephalopathy, copious oral secretions requiring frequent suctioning and desaturating on nasal cannula. Sputum (+) for Staph although no sign infiltrate on CXR: tracheobronchitis. 6/4 pt desat'ing and obtunded; intubated. On 6/7 pt with L deep sulcus sign on CXR but CT chest no pnmotx     Resp/Neuro/Psych: Cont Precedex dc Propofol and Fentanyl/ Pt weaning well on CPAP 5 + PS 5nd calm on only Precedex; extubate  ID: Cont Zosyn 2 to Pseudomonas in the sputum  FEN: Hold enteral feeds for extubation/ Cont daily Thiamine, MVI and Folate  Endo: Follow Glu  Renal: Keep Smith pt had urinary retention    CCT: 35 min

## 2018-06-09 LAB
ANION GAP SERPL CALC-SCNC: 11 MMOL/L — SIGNIFICANT CHANGE UP (ref 5–17)
BUN SERPL-MCNC: 11 MG/DL — SIGNIFICANT CHANGE UP (ref 7–23)
CALCIUM SERPL-MCNC: 8.9 MG/DL — SIGNIFICANT CHANGE UP (ref 8.5–10.1)
CHLORIDE SERPL-SCNC: 103 MMOL/L — SIGNIFICANT CHANGE UP (ref 96–108)
CO2 SERPL-SCNC: 26 MMOL/L — SIGNIFICANT CHANGE UP (ref 22–31)
CREAT SERPL-MCNC: 0.84 MG/DL — SIGNIFICANT CHANGE UP (ref 0.5–1.3)
GLUCOSE SERPL-MCNC: 99 MG/DL — SIGNIFICANT CHANGE UP (ref 70–99)
HCT VFR BLD CALC: 38.5 % — LOW (ref 39–50)
HGB BLD-MCNC: 12.1 G/DL — LOW (ref 13–17)
MAGNESIUM SERPL-MCNC: 2.4 MG/DL — SIGNIFICANT CHANGE UP (ref 1.6–2.6)
MCHC RBC-ENTMCNC: 27.3 PG — SIGNIFICANT CHANGE UP (ref 27–34)
MCHC RBC-ENTMCNC: 31.4 GM/DL — LOW (ref 32–36)
MCV RBC AUTO: 86.9 FL — SIGNIFICANT CHANGE UP (ref 80–100)
NRBC # BLD: 0 /100 WBCS — SIGNIFICANT CHANGE UP (ref 0–0)
PHOSPHATE SERPL-MCNC: 2.5 MG/DL — SIGNIFICANT CHANGE UP (ref 2.5–4.5)
PLATELET # BLD AUTO: 783 K/UL — HIGH (ref 150–400)
POTASSIUM SERPL-MCNC: 3.7 MMOL/L — SIGNIFICANT CHANGE UP (ref 3.5–5.3)
POTASSIUM SERPL-SCNC: 3.7 MMOL/L — SIGNIFICANT CHANGE UP (ref 3.5–5.3)
RBC # BLD: 4.43 M/UL — SIGNIFICANT CHANGE UP (ref 4.2–5.8)
RBC # FLD: 13.3 % — SIGNIFICANT CHANGE UP (ref 10.3–14.5)
SODIUM SERPL-SCNC: 140 MMOL/L — SIGNIFICANT CHANGE UP (ref 135–145)
WBC # BLD: 8.53 K/UL — SIGNIFICANT CHANGE UP (ref 3.8–10.5)
WBC # FLD AUTO: 8.53 K/UL — SIGNIFICANT CHANGE UP (ref 3.8–10.5)

## 2018-06-09 PROCEDURE — 71045 X-RAY EXAM CHEST 1 VIEW: CPT | Mod: 26

## 2018-06-09 PROCEDURE — 99233 SBSQ HOSP IP/OBS HIGH 50: CPT | Mod: GC

## 2018-06-09 RX ORDER — CIPROFLOXACIN LACTATE 400MG/40ML
400 VIAL (ML) INTRAVENOUS EVERY 12 HOURS
Qty: 0 | Refills: 0 | Status: DISCONTINUED | OUTPATIENT
Start: 2018-06-09 | End: 2018-06-11

## 2018-06-09 RX ORDER — TAMSULOSIN HYDROCHLORIDE 0.4 MG/1
0.4 CAPSULE ORAL AT BEDTIME
Qty: 0 | Refills: 0 | Status: DISCONTINUED | OUTPATIENT
Start: 2018-06-09 | End: 2018-06-09

## 2018-06-09 RX ORDER — TAMSULOSIN HYDROCHLORIDE 0.4 MG/1
0.4 CAPSULE ORAL ONCE
Qty: 0 | Refills: 0 | Status: COMPLETED | OUTPATIENT
Start: 2018-06-09 | End: 2018-06-09

## 2018-06-09 RX ADMIN — ENOXAPARIN SODIUM 40 MILLIGRAM(S): 100 INJECTION SUBCUTANEOUS at 12:21

## 2018-06-09 RX ADMIN — PANTOPRAZOLE SODIUM 40 MILLIGRAM(S): 20 TABLET, DELAYED RELEASE ORAL at 12:20

## 2018-06-09 RX ADMIN — Medication 1 TABLET(S): at 12:20

## 2018-06-09 RX ADMIN — Medication 200 MILLIGRAM(S): at 21:26

## 2018-06-09 RX ADMIN — Medication 200 MILLIGRAM(S): at 10:27

## 2018-06-09 RX ADMIN — Medication 1 GRAM(S): at 17:51

## 2018-06-09 RX ADMIN — Medication 1 GRAM(S): at 12:20

## 2018-06-09 RX ADMIN — Medication 100 MILLIGRAM(S): at 21:26

## 2018-06-09 RX ADMIN — QUETIAPINE FUMARATE 25 MILLIGRAM(S): 200 TABLET, FILM COATED ORAL at 21:26

## 2018-06-09 RX ADMIN — Medication 100 MILLIGRAM(S): at 12:20

## 2018-06-09 RX ADMIN — HALOPERIDOL DECANOATE 2.5 MILLIGRAM(S): 100 INJECTION INTRAMUSCULAR at 03:30

## 2018-06-09 RX ADMIN — CHLORHEXIDINE GLUCONATE 1 APPLICATION(S): 213 SOLUTION TOPICAL at 05:33

## 2018-06-09 RX ADMIN — Medication 1 GRAM(S): at 05:32

## 2018-06-09 RX ADMIN — TAMSULOSIN HYDROCHLORIDE 0.4 MILLIGRAM(S): 0.4 CAPSULE ORAL at 09:19

## 2018-06-09 RX ADMIN — Medication 1 MILLIGRAM(S): at 12:20

## 2018-06-09 RX ADMIN — PIPERACILLIN AND TAZOBACTAM 25 GRAM(S): 4; .5 INJECTION, POWDER, LYOPHILIZED, FOR SOLUTION INTRAVENOUS at 05:32

## 2018-06-09 NOTE — PROGRESS NOTE ADULT - SUBJECTIVE AND OBJECTIVE BOX
HPI:  53M PMH HLD, GERD and etOH abuse with prior admission 2 to ETOH w/drawal. Pt presented 2 to c/o tremors, abdominal pain, nausea, and body aches. Pt states he stopped drinking 5/24 and he feels agitated, anxious and sweating like his typical withdrawal. Pt admitted to [facility] and RRT/ code gray called 2 to increased agitation. Admitting dx 1) ETOH abuse with w/drawal/DTs 2) Hypokalemia. Since admission pt with worsening ETOH withdrawal/DTs/ encephalopathy, copious oral secretions requiring frequent suctioning and desaturating on nasal cannula. Sputum (+) for Staph although no sign infiltrate on CXR: tracheobronchitis. 6/4 pt desat'ing and obtunded; intubated. On 6/7 pt with L deep sulcus sign on CXR but CT chest no pneumotx     ## Labs:    ## Imaging:  < CXR (06.09.18 @ 08:39) >  The NG tube is in stomach. Lungs are clear.  </CXR >    ## Medications:  ciprofloxacin   IVPB 400 milliGRAM(s) IV Intermittent every 12 hours    benzonatate 100 milliGRAM(s) Oral three times a day    acetaminophen  Suppository 650 milliGRAM(s) Rectal every 6 hours PRN  haloperidol    Injectable 2.5 milliGRAM(s) IV Push every 6 hours PRN  QUEtiapine 25 milliGRAM(s) Oral at bedtime      enoxaparin Injectable 40 milliGRAM(s) SubCutaneous daily    polyethylene glycol 3350 17 Gram(s) Oral daily PRN  sucralfate suspension 1 Gram(s) Oral every 6 hours    ## Vitals:  Gen: lying comfortably in bed in no apparent distress  Mouth: PERRL  Lungs: CTA B/L  Heart: RRR  Abd: Soft/+BS  Ext: UE edema  Neuro: alert and responsive    ## Assessment / Plan:  59M with etOH withdrawal  - Currently on 1:1  - Holding etOH withdrawal drugs, non-focal and somewhat confused. Give Haldol for delirium and Seroquel QHS. He is out of the window for withdrawal at this time.  - Still with significant secretions  - Sputum with Pseudomonas, can change from Zosyn to Cipro    Will keep in ICU off all drugs and on 1:1.

## 2018-06-10 LAB
ANION GAP SERPL CALC-SCNC: 9 MMOL/L — SIGNIFICANT CHANGE UP (ref 5–17)
BUN SERPL-MCNC: 7 MG/DL — SIGNIFICANT CHANGE UP (ref 7–23)
CALCIUM SERPL-MCNC: 9.2 MG/DL — SIGNIFICANT CHANGE UP (ref 8.5–10.1)
CHLORIDE SERPL-SCNC: 105 MMOL/L — SIGNIFICANT CHANGE UP (ref 96–108)
CO2 SERPL-SCNC: 27 MMOL/L — SIGNIFICANT CHANGE UP (ref 22–31)
CREAT SERPL-MCNC: 0.77 MG/DL — SIGNIFICANT CHANGE UP (ref 0.5–1.3)
CULTURE RESULTS: SIGNIFICANT CHANGE UP
CULTURE RESULTS: SIGNIFICANT CHANGE UP
GLUCOSE SERPL-MCNC: 105 MG/DL — HIGH (ref 70–99)
HCT VFR BLD CALC: 39.3 % — SIGNIFICANT CHANGE UP (ref 39–50)
HGB BLD-MCNC: 12.7 G/DL — LOW (ref 13–17)
MAGNESIUM SERPL-MCNC: 2.6 MG/DL — SIGNIFICANT CHANGE UP (ref 1.6–2.6)
MCHC RBC-ENTMCNC: 27.9 PG — SIGNIFICANT CHANGE UP (ref 27–34)
MCHC RBC-ENTMCNC: 32.3 GM/DL — SIGNIFICANT CHANGE UP (ref 32–36)
MCV RBC AUTO: 86.4 FL — SIGNIFICANT CHANGE UP (ref 80–100)
NRBC # BLD: 0 /100 WBCS — SIGNIFICANT CHANGE UP (ref 0–0)
PHOSPHATE SERPL-MCNC: 2.9 MG/DL — SIGNIFICANT CHANGE UP (ref 2.5–4.5)
PLATELET # BLD AUTO: 777 K/UL — HIGH (ref 150–400)
POTASSIUM SERPL-MCNC: 3.5 MMOL/L — SIGNIFICANT CHANGE UP (ref 3.5–5.3)
POTASSIUM SERPL-SCNC: 3.5 MMOL/L — SIGNIFICANT CHANGE UP (ref 3.5–5.3)
RBC # BLD: 4.55 M/UL — SIGNIFICANT CHANGE UP (ref 4.2–5.8)
RBC # FLD: 13.3 % — SIGNIFICANT CHANGE UP (ref 10.3–14.5)
SODIUM SERPL-SCNC: 141 MMOL/L — SIGNIFICANT CHANGE UP (ref 135–145)
SPECIMEN SOURCE: SIGNIFICANT CHANGE UP
SPECIMEN SOURCE: SIGNIFICANT CHANGE UP
WBC # BLD: 5.36 K/UL — SIGNIFICANT CHANGE UP (ref 3.8–10.5)
WBC # FLD AUTO: 5.36 K/UL — SIGNIFICANT CHANGE UP (ref 3.8–10.5)

## 2018-06-10 PROCEDURE — 99233 SBSQ HOSP IP/OBS HIGH 50: CPT | Mod: GC

## 2018-06-10 RX ORDER — HALOPERIDOL DECANOATE 100 MG/ML
2.5 INJECTION INTRAMUSCULAR ONCE
Qty: 0 | Refills: 0 | Status: COMPLETED | OUTPATIENT
Start: 2018-06-10 | End: 2018-06-10

## 2018-06-10 RX ORDER — ACETAMINOPHEN 500 MG
1000 TABLET ORAL ONCE
Qty: 0 | Refills: 0 | Status: COMPLETED | OUTPATIENT
Start: 2018-06-10 | End: 2018-06-10

## 2018-06-10 RX ADMIN — Medication 1 TABLET(S): at 11:41

## 2018-06-10 RX ADMIN — Medication 1000 MILLIGRAM(S): at 02:00

## 2018-06-10 RX ADMIN — Medication 100 MILLIGRAM(S): at 11:41

## 2018-06-10 RX ADMIN — Medication 200 MILLIGRAM(S): at 23:12

## 2018-06-10 RX ADMIN — Medication 400 MILLIGRAM(S): at 01:29

## 2018-06-10 RX ADMIN — QUETIAPINE FUMARATE 25 MILLIGRAM(S): 200 TABLET, FILM COATED ORAL at 23:12

## 2018-06-10 RX ADMIN — Medication 200 MILLIGRAM(S): at 10:13

## 2018-06-10 RX ADMIN — Medication 100 MILLIGRAM(S): at 13:55

## 2018-06-10 RX ADMIN — Medication 100 MILLIGRAM(S): at 05:42

## 2018-06-10 RX ADMIN — Medication 1 GRAM(S): at 01:29

## 2018-06-10 RX ADMIN — Medication 1 GRAM(S): at 18:04

## 2018-06-10 RX ADMIN — Medication 1 GRAM(S): at 23:14

## 2018-06-10 RX ADMIN — Medication 1 GRAM(S): at 05:42

## 2018-06-10 RX ADMIN — HALOPERIDOL DECANOATE 2.5 MILLIGRAM(S): 100 INJECTION INTRAMUSCULAR at 10:12

## 2018-06-10 RX ADMIN — CHLORHEXIDINE GLUCONATE 1 APPLICATION(S): 213 SOLUTION TOPICAL at 05:42

## 2018-06-10 RX ADMIN — ENOXAPARIN SODIUM 40 MILLIGRAM(S): 100 INJECTION SUBCUTANEOUS at 11:41

## 2018-06-10 RX ADMIN — Medication 1 MILLIGRAM(S): at 11:41

## 2018-06-10 RX ADMIN — Medication 1 GRAM(S): at 11:41

## 2018-06-10 RX ADMIN — Medication 100 MILLIGRAM(S): at 23:12

## 2018-06-10 NOTE — CHART NOTE - NSCHARTNOTEFT_GEN_A_CORE
Pt medically stable for transfer to medical floor.       53M PMH alcohol abuse, hx of alcohol withdrawal, gerd, HLD, presents with tremors, abdominal pain, nausea, and body aches.  Pt stopped drinking day prior to presentation and now feeling agitated and anxious, with sweats similar to prior ETOH withdrawal. Also with nausea, emesis, epigastric pain. Admitted to medical service for alcohol withdrawal, gastritis and esophagitis. Pt on medical floor with worsening withdrawal/DTs, agitated, combative on ativan protocol. Multiple code jessica called for severe agitation. CIWA as high as 40s requiring four point restraints. Transferred to ICU for severe delirium tremens requiring sedation protocol. Pt s/p acute DT's and now with ICU delirium/encephalopathy slowly resolving.  Pt s/p septic shock for pneumonia. Intubated for hypoxia and started on pressors for hypotension.  Pt much more awake and alert. Pt medically stable for transfer to medical floor.  Signed out to Dr Cartagena      53M [facility] alcohol abuse, hx of alcohol withdrawal, gerd, HLD, presents with tremors, abdominal pain, nausea, and body aches.  Pt stopped drinking day prior to presentation and now feeling agitated and anxious, with sweats similar to prior ETOH withdrawal. Also with nausea, emesis, epigastric pain. Admitted to medical service for alcohol withdrawal, gastritis and esophagitis. Pt on medical floor with worsening withdrawal/DTs, agitated, combative on ativan protocol. Multiple code jessica called for severe agitation. CIWA as high as 40s requiring four point restraints. Transferred to ICU for severe delirium tremens requiring sedation protocol. Pt s/p acute DT's and now with ICU delirium/encephalopathy slowly resolving.  Pt s/p septic shock for pneumonia. Intubated for hypoxia and started on pressors for hypotension.  Pt much more awake and alert.

## 2018-06-10 NOTE — PROGRESS NOTE ADULT - PROVIDER SPECIALTY LIST ADULT
Critical Care
Gastroenterology
Critical Care
Gastroenterology
Gastroenterology

## 2018-06-10 NOTE — PROGRESS NOTE ADULT - SUBJECTIVE AND OBJECTIVE BOX
HPI:  53M PMH HLD, GERD and etOH abuse with prior admission 2 to ETOH w/drawal. Pt presented 2 to c/o tremors, abdominal pain, nausea, and body aches. Pt states he stopped drinking 5/24 and he feels agitated, anxious and sweating like his typical withdrawal. Pt admitted to [facility] and RRT/ code gray called 2 to increased agitation. Admitting dx 1) ETOH abuse with w/drawal/DTs 2) Hypokalemia. Since admission pt with worsening ETOH withdrawal/DTs/ encephalopathy, copious oral secretions requiring frequent suctioning and desaturating on nasal cannula. Sputum (+) for Staph although no sign infiltrate on CXR: tracheobronchitis. 6/4 pt desat'ing and obtunded; intubated. On 6/7 pt with L deep sulcus sign on CXR but CT chest no pneumotx     ## Labs:             12.7   5.36  )-----------( 777      ( 10 Jovan 2018 04:14 )             39.3     141  |  105  |  7   ----------------------------<  105<H>  3.5   |  27  |  0.77    Ca    9.2      10 Jovan 2018 04:14  Phos  2.9     06-10  Mg     2.6     06-10    Culture - Sputum . (06.04.18 @ 17:22)  Numerous Pseudomonas aeruginosa    -  Ciprofloxacin: S <=0.5    -  Cefepime: S 4    -  Ceftazidime: S 4    -  Aztreonam: I 16    -  Amikacin: S <=8    -  Piperacillin/Tazobactam: S <=8    -  Imipenem: S <=1    -  Levofloxacin: S 2    -  Gentamicin: S <=1    -  Tobramycin: S <=2    -  Meropenem: S <=1    Specimen Source: .Sputum Sputum    Culture Results:   Normal Respiratory Melanie present    Organism Identification: Pseudomonas aeruginosa    Organism: Pseudomonas aeruginosa    Method Type: NAYE    ## Imaging:  < CXR (06.09.18 @ 08:39) >  The NG tube is in stomach. Lungs are clear.  </CXR >    ## Medications:  ciprofloxacin   IVPB 400 milliGRAM(s) IV Intermittent every 12 hours    benzonatate 100 milliGRAM(s) Oral three times a day    acetaminophen  Suppository 650 milliGRAM(s) Rectal every 6 hours PRN  haloperidol    Injectable 2.5 milliGRAM(s) IV Push every 6 hours PRN  QUEtiapine 25 milliGRAM(s) Oral at bedtime    enoxaparin Injectable 40 milliGRAM(s) SubCutaneous daily    polyethylene glycol 3350 17 Gram(s) Oral daily PRN  sucralfate suspension 1 Gram(s) Oral every 6 hours    ## Vitals:  Gen: lying comfortably in bed in no apparent distress  Mouth: PERRL  Lungs: CTA B/L  Heart: RRR  Abd: Soft/+BS  Ext: UE edema  Neuro: alert and responsive    ## Assessment / Plan:  59M with etOH withdrawal  - Doing better, now off 1:1  - Hold all benzos and phenobarb. He is out of the window for withdrawal at this time.  - Give Haldol for delirium and Seroquel QHS.   - Secretions significantly improved  - Sputum with Pseudomonas, c/w Cipro for 7 days total    Can transfer to general medicine.

## 2018-06-11 ENCOUNTER — TRANSCRIPTION ENCOUNTER (OUTPATIENT)
Age: 53
End: 2018-06-11

## 2018-06-11 VITALS — SYSTOLIC BLOOD PRESSURE: 144 MMHG | HEART RATE: 74 BPM | DIASTOLIC BLOOD PRESSURE: 95 MMHG | RESPIRATION RATE: 20 BRPM

## 2018-06-11 DIAGNOSIS — A41.9 SEPSIS, UNSPECIFIED ORGANISM: ICD-10-CM

## 2018-06-11 DIAGNOSIS — J15.1 PNEUMONIA DUE TO PSEUDOMONAS: ICD-10-CM

## 2018-06-11 LAB
ANION GAP SERPL CALC-SCNC: 10 MMOL/L — SIGNIFICANT CHANGE UP (ref 5–17)
BUN SERPL-MCNC: 8 MG/DL — SIGNIFICANT CHANGE UP (ref 7–23)
CALCIUM SERPL-MCNC: 8.9 MG/DL — SIGNIFICANT CHANGE UP (ref 8.5–10.1)
CHLORIDE SERPL-SCNC: 107 MMOL/L — SIGNIFICANT CHANGE UP (ref 96–108)
CO2 SERPL-SCNC: 23 MMOL/L — SIGNIFICANT CHANGE UP (ref 22–31)
CREAT SERPL-MCNC: 0.67 MG/DL — SIGNIFICANT CHANGE UP (ref 0.5–1.3)
GLUCOSE SERPL-MCNC: 89 MG/DL — SIGNIFICANT CHANGE UP (ref 70–99)
HCT VFR BLD CALC: 39.9 % — SIGNIFICANT CHANGE UP (ref 39–50)
HGB BLD-MCNC: 12.8 G/DL — LOW (ref 13–17)
MAGNESIUM SERPL-MCNC: 2.4 MG/DL — SIGNIFICANT CHANGE UP (ref 1.6–2.6)
MCHC RBC-ENTMCNC: 27.6 PG — SIGNIFICANT CHANGE UP (ref 27–34)
MCHC RBC-ENTMCNC: 32.1 GM/DL — SIGNIFICANT CHANGE UP (ref 32–36)
MCV RBC AUTO: 86.2 FL — SIGNIFICANT CHANGE UP (ref 80–100)
NRBC # BLD: 0 /100 WBCS — SIGNIFICANT CHANGE UP (ref 0–0)
PHOSPHATE SERPL-MCNC: 3.1 MG/DL — SIGNIFICANT CHANGE UP (ref 2.5–4.5)
PLATELET # BLD AUTO: 789 K/UL — HIGH (ref 150–400)
POTASSIUM SERPL-MCNC: 3.5 MMOL/L — SIGNIFICANT CHANGE UP (ref 3.5–5.3)
POTASSIUM SERPL-SCNC: 3.5 MMOL/L — SIGNIFICANT CHANGE UP (ref 3.5–5.3)
RBC # BLD: 4.63 M/UL — SIGNIFICANT CHANGE UP (ref 4.2–5.8)
RBC # FLD: 13.3 % — SIGNIFICANT CHANGE UP (ref 10.3–14.5)
SODIUM SERPL-SCNC: 140 MMOL/L — SIGNIFICANT CHANGE UP (ref 135–145)
WBC # BLD: 5.95 K/UL — SIGNIFICANT CHANGE UP (ref 3.8–10.5)
WBC # FLD AUTO: 5.95 K/UL — SIGNIFICANT CHANGE UP (ref 3.8–10.5)

## 2018-06-11 PROCEDURE — 99239 HOSP IP/OBS DSCHRG MGMT >30: CPT

## 2018-06-11 PROCEDURE — 36000 PLACE NEEDLE IN VEIN: CPT

## 2018-06-11 PROCEDURE — 99223 1ST HOSP IP/OBS HIGH 75: CPT

## 2018-06-11 RX ORDER — CIPROFLOXACIN LACTATE 400MG/40ML
500 VIAL (ML) INTRAVENOUS EVERY 12 HOURS
Qty: 0 | Refills: 0 | Status: DISCONTINUED | OUTPATIENT
Start: 2018-06-11 | End: 2018-06-11

## 2018-06-11 RX ORDER — CIPROFLOXACIN LACTATE 400MG/40ML
1 VIAL (ML) INTRAVENOUS
Qty: 10 | Refills: 0
Start: 2018-06-11 | End: 2018-06-15

## 2018-06-11 RX ORDER — FOLIC ACID 0.8 MG
1 TABLET ORAL
Qty: 0 | Refills: 0 | DISCHARGE
Start: 2018-06-11

## 2018-06-11 RX ORDER — THIAMINE MONONITRATE (VIT B1) 100 MG
1 TABLET ORAL
Qty: 0 | Refills: 0 | DISCHARGE
Start: 2018-06-11

## 2018-06-11 RX ORDER — MULTIVIT-MIN/FERROUS GLUCONATE 9 MG/15 ML
1 LIQUID (ML) ORAL
Qty: 0 | Refills: 0 | DISCHARGE
Start: 2018-06-11

## 2018-06-11 RX ADMIN — Medication 1 MILLIGRAM(S): at 11:43

## 2018-06-11 RX ADMIN — Medication 1 TABLET(S): at 11:43

## 2018-06-11 RX ADMIN — ENOXAPARIN SODIUM 40 MILLIGRAM(S): 100 INJECTION SUBCUTANEOUS at 11:43

## 2018-06-11 RX ADMIN — Medication 200 MILLIGRAM(S): at 10:34

## 2018-06-11 RX ADMIN — Medication 500 MILLIGRAM(S): at 17:25

## 2018-06-11 RX ADMIN — Medication 1 GRAM(S): at 17:25

## 2018-06-11 RX ADMIN — Medication 100 MILLIGRAM(S): at 16:11

## 2018-06-11 RX ADMIN — Medication 1 GRAM(S): at 06:35

## 2018-06-11 RX ADMIN — Medication 100 MILLIGRAM(S): at 06:34

## 2018-06-11 RX ADMIN — Medication 100 MILLIGRAM(S): at 11:43

## 2018-06-11 RX ADMIN — CHLORHEXIDINE GLUCONATE 1 APPLICATION(S): 213 SOLUTION TOPICAL at 05:24

## 2018-06-11 RX ADMIN — Medication 1 GRAM(S): at 11:43

## 2018-06-11 NOTE — DISCHARGE NOTE ADULT - SECONDARY DIAGNOSIS.
Chronic alcoholic gastritis without hemorrhage GERD with esophagitis Lactic acidosis Mixed hyperlipidemia

## 2018-06-11 NOTE — DISCHARGE NOTE ADULT - CARE PLAN
Principal Discharge DX:	Alcohol withdrawal syndrome, with delirium  Goal:	Treat ETOH withdrawal and PNA  Assessment and plan of treatment:	Treat ETOH withdrawal and PNA  Secondary Diagnosis:	Chronic alcoholic gastritis without hemorrhage  Secondary Diagnosis:	GERD with esophagitis  Secondary Diagnosis:	Lactic acidosis  Secondary Diagnosis:	Mixed hyperlipidemia

## 2018-06-11 NOTE — DISCHARGE NOTE ADULT - HOSPITAL COURSE
52 yo M with history of HLD, GERD and ETOH abuse with prior admission due to ETOH withdrawal who p/w abdominal pain, nausea, and body aches and went into ETOH withdrawal and had to be intubated on 6/4 after he became obtunded and was found to have Pseudomonas PNA.  His abd pain resolved, he was weaned off Precedex and phenobarb and discharged on Cipro into the custody of his wife. He was alert and oriented at the time of discharged and displayed no tremors or other signs of withdrawal.     Discharge time: 43 minutes

## 2018-06-11 NOTE — DISCHARGE NOTE ADULT - PATIENT PORTAL LINK FT
You can access the [facility] Patient Portal, offered by , by registering with the following website: [email or web address]

## 2018-06-11 NOTE — CHART NOTE - NSCHARTNOTEFT_GEN_A_CORE
Assessment: Pt with ETOHA & withdrawal & improved secretions. Pt with s/p septic shock for pneumonia & extubated 6/8. Pt tolerating po diet, however refusing lunch today due to increasing agitation    Factors impacting intake: [ ] none [ ] nausea  [ ] vomiting [ ] diarrhea [ ] constipation  [ ]chewing problems [ ] swallowing issues  [x ] other: increased agitation    Diet Prescription: Diet, DASH/TLC:   Sodium & Cholesterol Restricted (06-10-18 @ 14:16)    Intake: po intake % meals; refusing lunch today. Last BM x 3(6/9). Noted <50% nutritional needs x 5 days of hospitalization    Current Weight: Wt=75.5kg(6/11); no edema, Wt=80.9kg(525)  % Weight Change  5.4kg wt loss(7%) x 17 days    Pertinent Medications: MEDICATIONS  (STANDING):  benzonatate 100 milliGRAM(s) Oral three times a day  chlorhexidine 4% Liquid 1 Application(s) Topical <User Schedule>  ciprofloxacin     Tablet 500 milliGRAM(s) Oral every 12 hours  enoxaparin Injectable 40 milliGRAM(s) SubCutaneous daily  folic acid 1 milliGRAM(s) Oral daily  multivitamin/minerals 1 Tablet(s) Oral daily  QUEtiapine 25 milliGRAM(s) Oral at bedtime  sucralfate suspension 1 Gram(s) Oral every 6 hours  thiamine 100 milliGRAM(s) Oral daily    MEDICATIONS  (PRN):  acetaminophen  Suppository 650 milliGRAM(s) Rectal every 6 hours PRN For Temp greater than 38 C (100.4 F)  haloperidol    Injectable 2.5 milliGRAM(s) IV Push every 6 hours PRN severe Agitation  polyethylene glycol 3350 17 Gram(s) Oral daily PRN Constipation    Pertinent Labs: 06-11 Na140 mmol/L Glu 89 mg/dL K+ 3.5 mmol/L Cr  0.67 mg/dL BUN 8 mg/dL 06-11 Phos 3.1 mg/dL 06-08 Alb 2.3 g/dL<L>     CAPILLARY BLOOD GLUCOSE        Skin: intact    Estimated Needs:   [x ] no change since previous assessment 6/9/18  [ ] recalculated:     Previous Nutrition Diagnosis: [x] N/A  [ ] Inadequate Energy Intake [ ]Inadequate Oral Intake [ ] Excessive Energy Intake   [ ] Underweight [ ] Increased Nutrient Needs [ ] Overweight/Obesity   [ ] Altered GI Function [ ] Unintended Weight Loss [ ] Food & Nutrition Related Knowledge Deficit [ ] severe Malnutrition [ ] moderate malnutrition    Nutrition Diagnosis is [ ] ongoing  [ ] resolved  [ ] improved  [x ] not applicable     New Nutrition Diagnosis: [ ] Inadequate Energy Intake [ ]Inadequate Oral Intake [ ] Excessive Energy Intake   [ ] Underweight [ ] Increased Nutrient Needs [ ] Overweight/Obesity   [ ] Altered GI Function [ ] Unintended Weight Loss [ ] Food & Nutrition Related Knowledge Deficit [x ] Acute severe Malnutrition     Related to:  Inadequate energy & protein intake related to s/p septic shock for pneumonia    As evidenced by:  sign wt loss 7% x 17 days & po intake <50% nutritional needs    Goal: Pt to consume >75% meals/supplements & maintain stable wt +/-2#        Interventions:   Recommend  [ ] Continue:  [x ] Change Diet To: Regular  [x ] Nutrition Supplement: Ensure Enlive 2 x day(350kcal & 20gm protein)  [ ] Nutrition Support:  [x ] Other:  Cater to food preferences    Monitoring and Evaluation:   [x ] PO intake [ x ] Tolerance to diet prescription [ x ] weights [ x ] labs[ x ] follow up per protocol  [ ] other: Assessment: Pt with ETOHA & withdrawal & improved secretions. Pt with s/p septic shock for pneumonia & extubated 6/8. Pt tolerating po diet, however refusing lunch today due to increasing agitation. Obtained food preferences to help increase po intake; pt states "I like hamburgers".     Factors impacting intake: [ ] none [ ] nausea  [ ] vomiting [ ] diarrhea [ ] constipation  [ ]chewing problems [ ] swallowing issues  [x ] other: increased agitation    Diet Prescription: Diet, DASH/TLC:   Sodium & Cholesterol Restricted (06-10-18 @ 14:16)    Intake: po intake % meals; refusing lunch today. Last BM x 3(6/9). Noted <50% nutritional needs x 5 days of hospitalization    Current Weight: Wt=75.5kg(6/11); no edema, Wt=80.9kg(525)  % Weight Change  5.4kg wt loss(7%) x 17 days    Nutrition focused physical exam conducted; Subcutaneous fat loss: [WNL ] Orbital fat pads region, [WNL ]Buccal fat region, [WNL ]Triceps region,  [WNL ]Ribs region.  Muscle wasting: [Mild ]Temples region, [WNL ]Clavicle region, [WNL ]Shoulder region, [unable ]Scapula region, [WNL ]Interosseous region,  [Mild ]thigh region, [WNL ]Calf region    Pertinent Medications: MEDICATIONS  (STANDING):  benzonatate 100 milliGRAM(s) Oral three times a day  chlorhexidine 4% Liquid 1 Application(s) Topical <User Schedule>  ciprofloxacin     Tablet 500 milliGRAM(s) Oral every 12 hours  enoxaparin Injectable 40 milliGRAM(s) SubCutaneous daily  folic acid 1 milliGRAM(s) Oral daily  multivitamin/minerals 1 Tablet(s) Oral daily  QUEtiapine 25 milliGRAM(s) Oral at bedtime  sucralfate suspension 1 Gram(s) Oral every 6 hours  thiamine 100 milliGRAM(s) Oral daily    MEDICATIONS  (PRN):  acetaminophen  Suppository 650 milliGRAM(s) Rectal every 6 hours PRN For Temp greater than 38 C (100.4 F)  haloperidol    Injectable 2.5 milliGRAM(s) IV Push every 6 hours PRN severe Agitation  polyethylene glycol 3350 17 Gram(s) Oral daily PRN Constipation    Pertinent Labs: 06-11 Na140 mmol/L Glu 89 mg/dL K+ 3.5 mmol/L Cr  0.67 mg/dL BUN 8 mg/dL 06-11 Phos 3.1 mg/dL 06-08 Alb 2.3 g/dL<L>     CAPILLARY BLOOD GLUCOSE        Skin: intact    Estimated Needs:   [x ] no change since previous assessment 6/9/18  [ ] recalculated:     Previous Nutrition Diagnosis: [x] N/A  [ ] Inadequate Energy Intake [ ]Inadequate Oral Intake [ ] Excessive Energy Intake   [ ] Underweight [ ] Increased Nutrient Needs [ ] Overweight/Obesity   [ ] Altered GI Function [ ] Unintended Weight Loss [ ] Food & Nutrition Related Knowledge Deficit [ ] severe Malnutrition [ ] moderate malnutrition    Nutrition Diagnosis is [ ] ongoing  [ ] resolved  [ ] improved  [x ] not applicable     New Nutrition Diagnosis: [ ] Inadequate Energy Intake [ ]Inadequate Oral Intake [ ] Excessive Energy Intake   [ ] Underweight [ ] Increased Nutrient Needs [ ] Overweight/Obesity   [ ] Altered GI Function [ ] Unintended Weight Loss [ ] Food & Nutrition Related Knowledge Deficit [x ] Acute severe Malnutrition     Related to:  Inadequate energy & protein intake related to s/p septic shock for pneumonia    As evidenced by:  sign wt loss 7% x 17 days & po intake <50% nutritional needs    Goal: Pt to consume >75% meals/supplements & maintain stable wt +/-2#        Interventions:   Recommend  [ ] Continue:  [x ] Change Diet To: Regular  [x ] Nutrition Supplement: Ensure Enlive 2 x day(350kcal & 20gm protein)  [ ] Nutrition Support:  [x ] Other:  Cater to food preferences    Monitoring and Evaluation:   [x ] PO intake [ x ] Tolerance to diet prescription [ x ] weights [ x ] labs[ x ] follow up per protocol  [ ] other: Assessment: Pt with ETOHA & withdrawal & improved secretion transferred to medical patient 6/10. Pt with s/p septic shock for pneumonia & extubated 6/8. Pt tolerating po diet, however refusing lunch today due to increasing agitation. Obtained food preferences to help increase po intake; pt states "I like hamburgers".     Factors impacting intake: [ ] none [ ] nausea  [ ] vomiting [ ] diarrhea [ ] constipation  [ ]chewing problems [ ] swallowing issues  [x ] other: increased agitation    Diet Prescription: Diet, DASH/TLC:   Sodium & Cholesterol Restricted (06-10-18 @ 14:16)    Intake: po intake % meals; refusing lunch today. Last BM x 3(6/9). Noted <50% nutritional needs x 5 days of hospitalization    Current Weight: Wt=75.5kg(6/11); no edema, Wt=80.9kg(525)  % Weight Change  5.4kg wt loss(7%) x 17 days    Nutrition focused physical exam conducted; Subcutaneous fat loss: [WNL ] Orbital fat pads region, [WNL ]Buccal fat region, [WNL ]Triceps region,  [WNL ]Ribs region.  Muscle wasting: [Mild ]Temples region, [WNL ]Clavicle region, [WNL ]Shoulder region, [unable ]Scapula region, [WNL ]Interosseous region,  [Mild ]thigh region, [WNL ]Calf region    Pertinent Medications: MEDICATIONS  (STANDING):  benzonatate 100 milliGRAM(s) Oral three times a day  chlorhexidine 4% Liquid 1 Application(s) Topical <User Schedule>  ciprofloxacin     Tablet 500 milliGRAM(s) Oral every 12 hours  enoxaparin Injectable 40 milliGRAM(s) SubCutaneous daily  folic acid 1 milliGRAM(s) Oral daily  multivitamin/minerals 1 Tablet(s) Oral daily  QUEtiapine 25 milliGRAM(s) Oral at bedtime  sucralfate suspension 1 Gram(s) Oral every 6 hours  thiamine 100 milliGRAM(s) Oral daily    MEDICATIONS  (PRN):  acetaminophen  Suppository 650 milliGRAM(s) Rectal every 6 hours PRN For Temp greater than 38 C (100.4 F)  haloperidol    Injectable 2.5 milliGRAM(s) IV Push every 6 hours PRN severe Agitation  polyethylene glycol 3350 17 Gram(s) Oral daily PRN Constipation    Pertinent Labs: 06-11 Na140 mmol/L Glu 89 mg/dL K+ 3.5 mmol/L Cr  0.67 mg/dL BUN 8 mg/dL 06-11 Phos 3.1 mg/dL 06-08 Alb 2.3 g/dL<L>     CAPILLARY BLOOD GLUCOSE        Skin: intact    Estimated Needs:   [x ] no change since previous assessment 6/9/18  [ ] recalculated:     Previous Nutrition Diagnosis: [x] N/A  [ ] Inadequate Energy Intake [ ]Inadequate Oral Intake [ ] Excessive Energy Intake   [ ] Underweight [ ] Increased Nutrient Needs [ ] Overweight/Obesity   [ ] Altered GI Function [ ] Unintended Weight Loss [ ] Food & Nutrition Related Knowledge Deficit [ ] severe Malnutrition [ ] moderate malnutrition    Nutrition Diagnosis is [ ] ongoing  [ ] resolved  [ ] improved  [x ] not applicable     New Nutrition Diagnosis: [ ] Inadequate Energy Intake [ ]Inadequate Oral Intake [ ] Excessive Energy Intake   [ ] Underweight [ ] Increased Nutrient Needs [ ] Overweight/Obesity   [ ] Altered GI Function [ ] Unintended Weight Loss [ ] Food & Nutrition Related Knowledge Deficit [x ] Acute severe Malnutrition     Related to:  Inadequate energy & protein intake related to s/p septic shock for pneumonia    As evidenced by:  sign wt loss 7% x 17 days & po intake <50% nutritional needs    Goal: Pt to consume >75% meals/supplements & maintain stable wt +/-2#        Interventions:   Recommend  [ ] Continue:  [x ] Change Diet To: Regular  [x ] Nutrition Supplement: Ensure Enlive 2 x day(350kcal & 20gm protein)  [ ] Nutrition Support:  [x ] Other:  Cater to food preferences    Monitoring and Evaluation:   [x ] PO intake [ x ] Tolerance to diet prescription [ x ] weights [ x ] labs[ x ] follow up per protocol  [ ] other:

## 2018-06-11 NOTE — CHART NOTE - NSCHARTNOTEFT_GEN_A_CORE
Upon Nutritional Assessment by the Registered Dietitian your patient was determined to meet criteria / has evidence of the following diagnosis/diagnoses:          [ ]  Mild Protein Calorie Malnutrition        [ ]  Moderate Protein Calorie Malnutrition        [x ] Severe Protein Calorie Malnutrition        [ ] Unspecified Protein Calorie Malnutrition        [ ] Underweight / BMI <19        [ ] Morbid Obesity / BMI > 40      Findings as based on:  •  Comprehensive nutrition assessment and consultation  •  Calorie counts (nutrient intake analysis)  •  Food acceptance and intake status from observations by staff  •  Follow up  •  Patient education  •  Intervention secondary to interdisciplinary rounds  •   concerns      Treatment:    The following diet has been recommended:  Regular/Ensure Enlive 2 x day(350kcal & 20gm protein)    PROVIDER Section:     By signing this assessment you are acknowledging and agree with the diagnosis/diagnoses assigned by the Registered Dietitian    Comments:

## 2018-06-11 NOTE — DISCHARGE NOTE ADULT - MEDICATION SUMMARY - MEDICATIONS TO TAKE
I will START or STAY ON the medications listed below when I get home from the hospital:    pantoprazole 40 mg oral delayed release tablet  -- 1 tab(s) by mouth once a day   -- Indication: For Gastritis    ciprofloxacin 750 mg oral tablet  -- 1 tab(s) by mouth every 12 hours  -- Indication: For Pneumonia    Multiple Vitamins with Minerals oral tablet  -- 1 tab(s) by mouth once a day  -- Indication: For Alcohol use     folic acid 1 mg oral tablet  -- 1 tab(s) by mouth once a day  -- Indication: For Alcohol use     thiamine 100 mg oral tablet  -- 1 tab(s) by mouth once a day  -- Indication: For Alcohol use

## 2018-06-11 NOTE — CONSULT NOTE ADULT - SUBJECTIVE AND OBJECTIVE BOX
Infectious Diseases - Attending at Dr. Sheriff    HPI:  53 year old man with PMH gerd, HLD, etoh abuse presents with complaint of tremors, abdominal pain, nausea, and body aches.  He states he stopped drinking yesterday and this feels typical of his withdrawals.  He feels agitated and anxious, has been sweating.    In the ED, work up consistent with gastritis with esophagitis, CIWA 10.  Lactate 6.1-->3.2. (25 May 2018 03:23)      PAST MEDICAL & SURGICAL HISTORY:  Mixed hyperlipidemia  PUD (peptic ulcer disease)  No significant past surgical history      Allergies    No Known Allergies    Intolerances        FAMILY HISTORY:  No pertinent family history in first degree relatives      SOCIAL HISTORY:    REVIEW OF SYSTEMS:    Constitutional: No fever, weight loss or fatigue  Eyes: No eye pain, visual disturbances, or discharge  ENT:  No difficulty hearing, tinnitus, vertigo; No sinus or throat pain  Neck: No pain or stiffness  Respiratory: No cough, wheezing, chills or hemoptysis  Cardiovascular: No chest pain, palpitations, shortness of breath, dizziness or leg swelling  Gastrointestinal: No abdominal or epigastric pain. No nausea, vomiting or hematemesis; No diarrhea or constipation. No melena or hematochezia.  Genitourinary: No dysuria, frequency, hematuria or incontinence  Neurological: No headaches, memory loss, loss of strength, numbness or tremors  Skin: No itching, burning, rashes or lesions       MEDICATIONS  (STANDING):  benzonatate 100 milliGRAM(s) Oral three times a day  chlorhexidine 4% Liquid 1 Application(s) Topical <User Schedule>  ciprofloxacin     Tablet 500 milliGRAM(s) Oral every 12 hours  enoxaparin Injectable 40 milliGRAM(s) SubCutaneous daily  folic acid 1 milliGRAM(s) Oral daily  multivitamin/minerals 1 Tablet(s) Oral daily  QUEtiapine 25 milliGRAM(s) Oral at bedtime  sucralfate suspension 1 Gram(s) Oral every 6 hours  thiamine 100 milliGRAM(s) Oral daily    MEDICATIONS  (PRN):  acetaminophen  Suppository 650 milliGRAM(s) Rectal every 6 hours PRN For Temp greater than 38 C (100.4 F)  haloperidol    Injectable 2.5 milliGRAM(s) IV Push every 6 hours PRN severe Agitation  polyethylene glycol 3350 17 Gram(s) Oral daily PRN Constipation      Vital Signs Last 24 Hrs  T(C): 37.1 (11 Jun 2018 15:21), Max: 37.3 (11 Jun 2018 07:28)  T(F): 98.8 (11 Jun 2018 15:21), Max: 99.1 (11 Jun 2018 07:28)  HR: 72 (11 Jun 2018 12:59) (62 - 94)  BP: 134/86 (11 Jun 2018 12:59) (134/82 - 143/94)  BP(mean): 98 (11 Jun 2018 12:59) (93 - 104)  RR: 18 (11 Jun 2018 12:59) (11 - 21)  SpO2: 96% (11 Jun 2018 12:59) (96% - 100%)    PHYSICAL EXAM:    Constitutional: NAD, well-groomed, well-developed  HEENT: PERRLA, EOMI, Normal Hearing, MMM  Neck: No LAD, No JVD  Back: Normal spine flexure, No CVA tenderness  Respiratory: CTAB/L  Cardiovascular: S1 and S2, RRR, no M/G/R  Gastrointestinal: BS+, soft, NT/ND  Extremities: No peripheral edema  Vascular: 2+ peripheral pulses  Neurological: A/O x 3, no focal deficits  Skin: No rashes      LABS:                        12.8   5.95  )-----------( 789      ( 11 Jun 2018 03:49 )             39.9     06-11    140  |  107  |  8   ----------------------------<  89  3.5   |  23  |  0.67    Ca    8.9      11 Jun 2018 03:49  Phos  3.1     06-11  Mg     2.4     06-11            MICROBIOLOGY:  RECENT CULTURES:  06-05 .Blood Blood XXXX XXXX   No growth at 5 days.    06-05 .Urine Catheterized XXXX XXXX   No growth    06-04 .Sputum Sputum Pseudomonas aeruginosa   Numerous polymorphonuclear leukocytes per low power field  Numerous Squamous epithelial cells per low power field  Moderate Gram Negative Rods per oil power field  Few Gram Negative Coccobacilli per oil power field  Results consistent with oropharyngeal contamination   Numerous Pseudomonas aeruginosa  Normal Respiratory Melanie present          RADIOLOGY & ADDITIONAL STUDIES:

## 2018-06-13 DIAGNOSIS — R33.9 RETENTION OF URINE, UNSPECIFIED: ICD-10-CM
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
## 2018-06-13 DIAGNOSIS — E83.39 OTHER DISORDERS OF PHOSPHORUS METABOLISM: ICD-10-CM

## 2018-06-13 DIAGNOSIS — B95.61 METHICILLIN SUSCEPTIBLE STAPHYLOCOCCUS AUREUS INFECTION AS THE CAUSE OF DISEASES CLASSIFIED ELSEWHERE: ICD-10-CM

## 2018-06-13 DIAGNOSIS — E87.0 HYPEROSMOLALITY AND HYPERNATREMIA: ICD-10-CM

## 2018-06-13 DIAGNOSIS — Z78.1 PHYSICAL RESTRAINT STATUS: ICD-10-CM

## 2018-06-13 DIAGNOSIS — E43 UNSPECIFIED SEVERE PROTEIN-CALORIE MALNUTRITION: ICD-10-CM

## 2018-06-13 DIAGNOSIS — G92 TOXIC ENCEPHALOPATHY: ICD-10-CM

## 2018-06-13 DIAGNOSIS — Z87.11 PERSONAL HISTORY OF PEPTIC ULCER DISEASE: ICD-10-CM

## 2018-06-13 DIAGNOSIS — R45.1 RESTLESSNESS AND AGITATION: ICD-10-CM

## 2018-06-13 DIAGNOSIS — A41.9 SEPSIS, UNSPECIFIED ORGANISM: ICD-10-CM

## 2018-06-13 DIAGNOSIS — F10.231 ALCOHOL DEPENDENCE WITH WITHDRAWAL DELIRIUM: ICD-10-CM

## 2018-06-13 DIAGNOSIS — K29.20 ALCOHOLIC GASTRITIS WITHOUT BLEEDING: ICD-10-CM

## 2018-06-13 DIAGNOSIS — E87.2 ACIDOSIS: ICD-10-CM

## 2018-06-13 DIAGNOSIS — E87.6 HYPOKALEMIA: ICD-10-CM

## 2018-06-13 DIAGNOSIS — E51.9 THIAMINE DEFICIENCY, UNSPECIFIED: ICD-10-CM

## 2018-06-13 DIAGNOSIS — R65.21 SEVERE SEPSIS WITH SEPTIC SHOCK: ICD-10-CM

## 2018-06-13 DIAGNOSIS — J15.1 PNEUMONIA DUE TO PSEUDOMONAS: ICD-10-CM

## 2018-06-13 DIAGNOSIS — E53.8 DEFICIENCY OF OTHER SPECIFIED B GROUP VITAMINS: ICD-10-CM

## 2018-06-13 DIAGNOSIS — K21.0 GASTRO-ESOPHAGEAL REFLUX DISEASE WITH ESOPHAGITIS: ICD-10-CM

## 2018-06-13 DIAGNOSIS — R09.02 HYPOXEMIA: ICD-10-CM

## 2018-06-13 DIAGNOSIS — E78.2 MIXED HYPERLIPIDEMIA: ICD-10-CM

## 2018-07-21 NOTE — H&P ADULT - PROBLEM SELECTOR PROBLEM 1
Pt mother calling outpt call line again. Called last night for fever, OV in office earlier today.,   Mother used temporal thermometer and got 107. Wanting to know what to do. She reports when she got this temp, child was tired, laying down.   I expressed Alcohol withdrawal syndrome without complication

## 2018-07-23 ENCOUNTER — EMERGENCY (EMERGENCY)
Facility: HOSPITAL | Age: 53
LOS: 0 days | Discharge: HOME | End: 2018-07-24
Attending: EMERGENCY MEDICINE
Payer: MEDICAID

## 2018-07-23 VITALS
DIASTOLIC BLOOD PRESSURE: 89 MMHG | SYSTOLIC BLOOD PRESSURE: 126 MMHG | RESPIRATION RATE: 18 BRPM | HEART RATE: 106 BPM | TEMPERATURE: 99 F | OXYGEN SATURATION: 99 %

## 2018-07-23 DIAGNOSIS — K21.9 GASTRO-ESOPHAGEAL REFLUX DISEASE WITHOUT ESOPHAGITIS: ICD-10-CM

## 2018-07-23 DIAGNOSIS — R11.2 NAUSEA WITH VOMITING, UNSPECIFIED: ICD-10-CM

## 2018-07-23 DIAGNOSIS — R10.9 UNSPECIFIED ABDOMINAL PAIN: ICD-10-CM

## 2018-07-23 LAB
APTT BLD: 26.9 SEC — LOW (ref 27.5–37.4)
HCT VFR BLD CALC: 44.7 % — SIGNIFICANT CHANGE UP (ref 39–50)
HGB BLD-MCNC: 14.8 G/DL — SIGNIFICANT CHANGE UP (ref 13–17)
INR BLD: 1.05 RATIO — SIGNIFICANT CHANGE UP (ref 0.88–1.16)
LACTATE SERPL-SCNC: 4.8 MMOL/L — CRITICAL HIGH (ref 0.7–2)
MCHC RBC-ENTMCNC: 27.4 PG — SIGNIFICANT CHANGE UP (ref 27–34)
MCHC RBC-ENTMCNC: 33.1 GM/DL — SIGNIFICANT CHANGE UP (ref 32–36)
MCV RBC AUTO: 82.8 FL — SIGNIFICANT CHANGE UP (ref 80–100)
NRBC # BLD: 0 /100 WBCS — SIGNIFICANT CHANGE UP (ref 0–0)
PLATELET # BLD AUTO: 202 K/UL — SIGNIFICANT CHANGE UP (ref 150–400)
PROTHROM AB SERPL-ACNC: 11.5 SEC — SIGNIFICANT CHANGE UP (ref 9.8–12.7)
RBC # BLD: 5.4 M/UL — SIGNIFICANT CHANGE UP (ref 4.2–5.8)
RBC # FLD: 13.4 % — SIGNIFICANT CHANGE UP (ref 10.3–14.5)
WBC # BLD: 6.03 K/UL — SIGNIFICANT CHANGE UP (ref 3.8–10.5)
WBC # FLD AUTO: 6.03 K/UL — SIGNIFICANT CHANGE UP (ref 3.8–10.5)

## 2018-07-23 PROCEDURE — 99285 EMERGENCY DEPT VISIT HI MDM: CPT

## 2018-07-23 PROCEDURE — 71045 X-RAY EXAM CHEST 1 VIEW: CPT | Mod: 26

## 2018-07-23 PROCEDURE — 74177 CT ABD & PELVIS W/CONTRAST: CPT | Mod: 26

## 2018-07-23 RX ORDER — IOHEXOL 300 MG/ML
30 INJECTION, SOLUTION INTRAVENOUS ONCE
Qty: 0 | Refills: 0 | Status: COMPLETED | OUTPATIENT
Start: 2018-07-23 | End: 2018-07-23

## 2018-07-23 RX ORDER — SODIUM CHLORIDE 9 MG/ML
2000 INJECTION INTRAMUSCULAR; INTRAVENOUS; SUBCUTANEOUS ONCE
Qty: 0 | Refills: 0 | Status: COMPLETED | OUTPATIENT
Start: 2018-07-23 | End: 2018-07-23

## 2018-07-23 RX ORDER — METOCLOPRAMIDE HCL 10 MG
10 TABLET ORAL ONCE
Qty: 0 | Refills: 0 | Status: COMPLETED | OUTPATIENT
Start: 2018-07-23 | End: 2018-07-23

## 2018-07-23 RX ORDER — SODIUM CHLORIDE 9 MG/ML
1000 INJECTION, SOLUTION INTRAVENOUS
Qty: 0 | Refills: 0 | Status: COMPLETED | OUTPATIENT
Start: 2018-07-23 | End: 2018-07-23

## 2018-07-23 RX ORDER — FAMOTIDINE 10 MG/ML
20 INJECTION INTRAVENOUS DAILY
Qty: 0 | Refills: 0 | Status: DISCONTINUED | OUTPATIENT
Start: 2018-07-23 | End: 2018-07-24

## 2018-07-23 RX ORDER — MORPHINE SULFATE 50 MG/1
2 CAPSULE, EXTENDED RELEASE ORAL ONCE
Qty: 0 | Refills: 0 | Status: DISCONTINUED | OUTPATIENT
Start: 2018-07-23 | End: 2018-07-23

## 2018-07-23 RX ORDER — SODIUM CHLORIDE 9 MG/ML
1000 INJECTION INTRAMUSCULAR; INTRAVENOUS; SUBCUTANEOUS ONCE
Qty: 0 | Refills: 0 | Status: COMPLETED | OUTPATIENT
Start: 2018-07-23 | End: 2018-07-23

## 2018-07-23 RX ORDER — MORPHINE SULFATE 50 MG/1
4 CAPSULE, EXTENDED RELEASE ORAL ONCE
Qty: 0 | Refills: 0 | Status: DISCONTINUED | OUTPATIENT
Start: 2018-07-23 | End: 2018-07-23

## 2018-07-23 RX ADMIN — MORPHINE SULFATE 4 MILLIGRAM(S): 50 CAPSULE, EXTENDED RELEASE ORAL at 21:11

## 2018-07-23 RX ADMIN — MORPHINE SULFATE 2 MILLIGRAM(S): 50 CAPSULE, EXTENDED RELEASE ORAL at 22:19

## 2018-07-23 RX ADMIN — SODIUM CHLORIDE 1000 MILLILITER(S): 9 INJECTION INTRAMUSCULAR; INTRAVENOUS; SUBCUTANEOUS at 20:53

## 2018-07-23 RX ADMIN — FAMOTIDINE 104 MILLIGRAM(S): 10 INJECTION INTRAVENOUS at 20:13

## 2018-07-23 RX ADMIN — SODIUM CHLORIDE 120 MILLILITER(S): 9 INJECTION, SOLUTION INTRAVENOUS at 20:56

## 2018-07-23 RX ADMIN — Medication 50 MILLIGRAM(S): at 23:54

## 2018-07-23 RX ADMIN — Medication 10 MILLIGRAM(S): at 20:12

## 2018-07-23 RX ADMIN — MORPHINE SULFATE 2 MILLIGRAM(S): 50 CAPSULE, EXTENDED RELEASE ORAL at 22:40

## 2018-07-23 RX ADMIN — SODIUM CHLORIDE 2000 MILLILITER(S): 9 INJECTION INTRAMUSCULAR; INTRAVENOUS; SUBCUTANEOUS at 23:57

## 2018-07-23 RX ADMIN — MORPHINE SULFATE 4 MILLIGRAM(S): 50 CAPSULE, EXTENDED RELEASE ORAL at 20:12

## 2018-07-23 RX ADMIN — IOHEXOL 30 MILLILITER(S): 300 INJECTION, SOLUTION INTRAVENOUS at 20:13

## 2018-07-23 NOTE — ED PROVIDER NOTE - PHYSICAL EXAMINATION
Vitals: tachy at 106, otherwise WNL  Gen: AAOx3, NAD, sitting uncomfortably in stretcher, non-toxic  Head: ncat, perrla, eomi b/l  Neck: supple, no lymphadenopathy, no midline deviation  Heart: rrr, no m/r/g  Lungs: CTA b/l, no rales/ronchi/wheezes  Abd: soft, diffusely tender, worse in epigastrium, non-distended, no rebound, + guarding   Ext: no clubbing/cyanosis/edema  Neuro: sensation and muscle strength intact b/l

## 2018-07-23 NOTE — ED PROVIDER NOTE - PROGRESS NOTE DETAILS
Results reported to patient--grossly benign, lactate initially elevated, likely 2/2 etoh intox and dehydration, significantly improved after hydration  Pt. reports feeling better after meds and fluids, wants to go home at this time, no signs of withdrawal at this time, pt. is clinically sober  pt. agrees to f/u with primary care outpt.; I counseled patient on alcohol abuse  pt. understands to return to ED if symptoms worsen; will d/c

## 2018-07-23 NOTE — ED PROVIDER NOTE - MEDICAL DECISION MAKING DETAILS
52 yo M with generalized abd pain, r/o pancreatitis, r/o ACS, ischemic bowel, perforation, alcohol intox  -basic labs, coags, type and screen, trop, lipase, lactate, ua, cx, drug screen, etoh level, iv hydration MVI, pepcid, reglan, CT abd/pel ++ contrast, cxr (recent pneumonia), ekg, monitor  -f/u results, reeval

## 2018-07-23 NOTE — ED ADULT NURSE NOTE - OBJECTIVE STATEMENT
received pt awake alert and oriented, VSS afebrile. R[place] Pt presents to the ED complaining of abdominal pain, nausea  and vomiting since this am. Awaiting Ct scan. will continue to monitor

## 2018-07-23 NOTE — ED PROVIDER NOTE - OBJECTIVE STATEMENT
54 yo M with diffuse abd pain, n/v, for 3 days.  Pt. says it feels like the last time he was here.  No other complaints or inciting event.    ROS: negative for fever, cough, headache, chest pain, shortness of breath, rash, paresthesia, and weakness--all other systems reviewed are negative.   PMH: gerd, HLD; Meds: See EMR; SH: Denies smoking/drug use, + chronic alcohol abuse

## 2018-07-24 VITALS
RESPIRATION RATE: 18 BRPM | SYSTOLIC BLOOD PRESSURE: 140 MMHG | OXYGEN SATURATION: 98 % | TEMPERATURE: 98 F | HEART RATE: 74 BPM | DIASTOLIC BLOOD PRESSURE: 79 MMHG

## 2018-07-24 LAB
ALBUMIN SERPL ELPH-MCNC: 3.7 G/DL — SIGNIFICANT CHANGE UP (ref 3.3–5)
ALP SERPL-CCNC: 51 U/L — SIGNIFICANT CHANGE UP (ref 40–120)
ALT FLD-CCNC: 26 U/L — SIGNIFICANT CHANGE UP (ref 12–78)
ANION GAP SERPL CALC-SCNC: 13 MMOL/L — SIGNIFICANT CHANGE UP (ref 5–17)
AST SERPL-CCNC: 35 U/L — SIGNIFICANT CHANGE UP (ref 15–37)
BILIRUB SERPL-MCNC: 1.1 MG/DL — SIGNIFICANT CHANGE UP (ref 0.2–1.2)
BUN SERPL-MCNC: 13 MG/DL — SIGNIFICANT CHANGE UP (ref 7–23)
CALCIUM SERPL-MCNC: 7.9 MG/DL — LOW (ref 8.5–10.1)
CHLORIDE SERPL-SCNC: 105 MMOL/L — SIGNIFICANT CHANGE UP (ref 96–108)
CO2 SERPL-SCNC: 21 MMOL/L — LOW (ref 22–31)
CREAT SERPL-MCNC: 0.7 MG/DL — SIGNIFICANT CHANGE UP (ref 0.5–1.3)
ETHANOL SERPL-MCNC: 219 MG/DL — HIGH (ref 0–10)
GLUCOSE SERPL-MCNC: 72 MG/DL — SIGNIFICANT CHANGE UP (ref 70–99)
LACTATE SERPL-SCNC: 2.8 MMOL/L — HIGH (ref 0.7–2)
LIDOCAIN IGE QN: 972 U/L — HIGH (ref 73–393)
POTASSIUM SERPL-MCNC: 3.7 MMOL/L — SIGNIFICANT CHANGE UP (ref 3.5–5.3)
POTASSIUM SERPL-SCNC: 3.7 MMOL/L — SIGNIFICANT CHANGE UP (ref 3.5–5.3)
PROT SERPL-MCNC: 7.2 GM/DL — SIGNIFICANT CHANGE UP (ref 6–8.3)
SODIUM SERPL-SCNC: 139 MMOL/L — SIGNIFICANT CHANGE UP (ref 135–145)
TROPONIN I SERPL-MCNC: <.015 NG/ML — SIGNIFICANT CHANGE UP (ref 0.01–0.04)

## 2018-07-24 RX ORDER — ACETAMINOPHEN 500 MG
650 TABLET ORAL ONCE
Qty: 0 | Refills: 0 | Status: DISCONTINUED | OUTPATIENT
Start: 2018-07-24 | End: 2018-07-24

## 2018-08-19 ENCOUNTER — INPATIENT (INPATIENT)
Facility: HOSPITAL | Age: 53
LOS: 0 days | Discharge: LEFT BEFORE DISCHARGE | End: 2018-08-20
Attending: INTERNAL MEDICINE | Admitting: INTERNAL MEDICINE
Payer: MEDICAID

## 2018-08-19 VITALS
TEMPERATURE: 98 F | RESPIRATION RATE: 17 BRPM | SYSTOLIC BLOOD PRESSURE: 120 MMHG | HEART RATE: 102 BPM | HEIGHT: 67 IN | DIASTOLIC BLOOD PRESSURE: 81 MMHG | OXYGEN SATURATION: 99 % | WEIGHT: 167.99 LBS

## 2018-08-19 DIAGNOSIS — N18.2 CHRONIC KIDNEY DISEASE, STAGE 2 (MILD): ICD-10-CM

## 2018-08-19 DIAGNOSIS — N17.9 ACUTE KIDNEY FAILURE, UNSPECIFIED: ICD-10-CM

## 2018-08-19 DIAGNOSIS — K27.9 PEPTIC ULCER, SITE UNSPECIFIED, UNSPECIFIED AS ACUTE OR CHRONIC, WITHOUT HEMORRHAGE OR PERFORATION: ICD-10-CM

## 2018-08-19 DIAGNOSIS — F10.20 ALCOHOL DEPENDENCE, UNCOMPLICATED: ICD-10-CM

## 2018-08-19 LAB
ALBUMIN SERPL ELPH-MCNC: 3.4 G/DL — SIGNIFICANT CHANGE UP (ref 3.3–5)
ALBUMIN SERPL ELPH-MCNC: 3.8 G/DL — SIGNIFICANT CHANGE UP (ref 3.3–5)
ALP SERPL-CCNC: 46 U/L — SIGNIFICANT CHANGE UP (ref 40–120)
ALP SERPL-CCNC: 54 U/L — SIGNIFICANT CHANGE UP (ref 40–120)
ALT FLD-CCNC: 36 U/L — SIGNIFICANT CHANGE UP (ref 12–78)
ALT FLD-CCNC: 42 U/L — SIGNIFICANT CHANGE UP (ref 12–78)
ANION GAP SERPL CALC-SCNC: 13 MMOL/L — SIGNIFICANT CHANGE UP (ref 5–17)
ANION GAP SERPL CALC-SCNC: 8 MMOL/L — SIGNIFICANT CHANGE UP (ref 5–17)
APTT BLD: 28.1 SEC — SIGNIFICANT CHANGE UP (ref 27.5–37.4)
AST SERPL-CCNC: 39 U/L — HIGH (ref 15–37)
AST SERPL-CCNC: 49 U/L — HIGH (ref 15–37)
BASOPHILS # BLD AUTO: 0.04 K/UL — SIGNIFICANT CHANGE UP (ref 0–0.2)
BASOPHILS NFR BLD AUTO: 1.3 % — SIGNIFICANT CHANGE UP (ref 0–2)
BILIRUB DIRECT SERPL-MCNC: 0.25 MG/DL — HIGH (ref 0.05–0.2)
BILIRUB INDIRECT FLD-MCNC: 0.6 MG/DL — SIGNIFICANT CHANGE UP (ref 0.2–1)
BILIRUB SERPL-MCNC: 0.7 MG/DL — SIGNIFICANT CHANGE UP (ref 0.2–1.2)
BILIRUB SERPL-MCNC: 0.8 MG/DL — SIGNIFICANT CHANGE UP (ref 0.2–1.2)
BUN SERPL-MCNC: 10 MG/DL — SIGNIFICANT CHANGE UP (ref 7–23)
BUN SERPL-MCNC: 7 MG/DL — SIGNIFICANT CHANGE UP (ref 7–23)
CALCIUM SERPL-MCNC: 7.6 MG/DL — LOW (ref 8.5–10.1)
CALCIUM SERPL-MCNC: 8.7 MG/DL — SIGNIFICANT CHANGE UP (ref 8.5–10.1)
CHLORIDE SERPL-SCNC: 104 MMOL/L — SIGNIFICANT CHANGE UP (ref 96–108)
CHLORIDE SERPL-SCNC: 108 MMOL/L — SIGNIFICANT CHANGE UP (ref 96–108)
CK MB CFR SERPL CALC: 1.1 NG/ML — SIGNIFICANT CHANGE UP (ref 0.5–3.6)
CO2 SERPL-SCNC: 25 MMOL/L — SIGNIFICANT CHANGE UP (ref 22–31)
CO2 SERPL-SCNC: 26 MMOL/L — SIGNIFICANT CHANGE UP (ref 22–31)
CREAT SERPL-MCNC: 0.71 MG/DL — SIGNIFICANT CHANGE UP (ref 0.5–1.3)
CREAT SERPL-MCNC: 0.82 MG/DL — SIGNIFICANT CHANGE UP (ref 0.5–1.3)
EOSINOPHIL # BLD AUTO: 0.14 K/UL — SIGNIFICANT CHANGE UP (ref 0–0.5)
EOSINOPHIL NFR BLD AUTO: 4.4 % — SIGNIFICANT CHANGE UP (ref 0–6)
ETHANOL SERPL-MCNC: 226 MG/DL — HIGH (ref 0–10)
GLUCOSE SERPL-MCNC: 78 MG/DL — SIGNIFICANT CHANGE UP (ref 70–99)
GLUCOSE SERPL-MCNC: 98 MG/DL — SIGNIFICANT CHANGE UP (ref 70–99)
HCT VFR BLD CALC: 44.7 % — SIGNIFICANT CHANGE UP (ref 39–50)
HGB BLD-MCNC: 14.7 G/DL — SIGNIFICANT CHANGE UP (ref 13–17)
IMM GRANULOCYTES NFR BLD AUTO: 0 % — SIGNIFICANT CHANGE UP (ref 0–1.5)
INR BLD: 0.96 RATIO — SIGNIFICANT CHANGE UP (ref 0.88–1.16)
LIDOCAIN IGE QN: 505 U/L — HIGH (ref 73–393)
LYMPHOCYTES # BLD AUTO: 1.88 K/UL — SIGNIFICANT CHANGE UP (ref 1–3.3)
LYMPHOCYTES # BLD AUTO: 58.9 % — HIGH (ref 13–44)
MAGNESIUM SERPL-MCNC: 1.7 MG/DL — SIGNIFICANT CHANGE UP (ref 1.6–2.6)
MAGNESIUM SERPL-MCNC: 2.1 MG/DL — SIGNIFICANT CHANGE UP (ref 1.6–2.6)
MCHC RBC-ENTMCNC: 27.8 PG — SIGNIFICANT CHANGE UP (ref 27–34)
MCHC RBC-ENTMCNC: 32.9 GM/DL — SIGNIFICANT CHANGE UP (ref 32–36)
MCV RBC AUTO: 84.7 FL — SIGNIFICANT CHANGE UP (ref 80–100)
MONOCYTES # BLD AUTO: 0.27 K/UL — SIGNIFICANT CHANGE UP (ref 0–0.9)
MONOCYTES NFR BLD AUTO: 8.5 % — SIGNIFICANT CHANGE UP (ref 2–14)
NEUTROPHILS # BLD AUTO: 0.86 K/UL — LOW (ref 1.8–7.4)
NEUTROPHILS NFR BLD AUTO: 26.9 % — LOW (ref 43–77)
NRBC # BLD: 0 /100 WBCS — SIGNIFICANT CHANGE UP (ref 0–0)
PHOSPHATE SERPL-MCNC: 2.6 MG/DL — SIGNIFICANT CHANGE UP (ref 2.5–4.5)
PHOSPHATE SERPL-MCNC: 3 MG/DL — SIGNIFICANT CHANGE UP (ref 2.5–4.5)
PLATELET # BLD AUTO: 196 K/UL — SIGNIFICANT CHANGE UP (ref 150–400)
POTASSIUM SERPL-MCNC: 3.8 MMOL/L — SIGNIFICANT CHANGE UP (ref 3.5–5.3)
POTASSIUM SERPL-MCNC: 4.1 MMOL/L — SIGNIFICANT CHANGE UP (ref 3.5–5.3)
POTASSIUM SERPL-SCNC: 3.8 MMOL/L — SIGNIFICANT CHANGE UP (ref 3.5–5.3)
POTASSIUM SERPL-SCNC: 4.1 MMOL/L — SIGNIFICANT CHANGE UP (ref 3.5–5.3)
PROT SERPL-MCNC: 7.1 GM/DL — SIGNIFICANT CHANGE UP (ref 6–8.3)
PROT SERPL-MCNC: 8.1 GM/DL — SIGNIFICANT CHANGE UP (ref 6–8.3)
PROTHROM AB SERPL-ACNC: 10.4 SEC — SIGNIFICANT CHANGE UP (ref 9.8–12.7)
RBC # BLD: 5.28 M/UL — SIGNIFICANT CHANGE UP (ref 4.2–5.8)
RBC # FLD: 13.9 % — SIGNIFICANT CHANGE UP (ref 10.3–14.5)
SODIUM SERPL-SCNC: 142 MMOL/L — SIGNIFICANT CHANGE UP (ref 135–145)
SODIUM SERPL-SCNC: 142 MMOL/L — SIGNIFICANT CHANGE UP (ref 135–145)
TROPONIN I SERPL-MCNC: <.015 NG/ML — SIGNIFICANT CHANGE UP (ref 0.01–0.04)
WBC # BLD: 3.19 K/UL — LOW (ref 3.8–10.5)
WBC # FLD AUTO: 3.19 K/UL — LOW (ref 3.8–10.5)

## 2018-08-19 PROCEDURE — 71045 X-RAY EXAM CHEST 1 VIEW: CPT | Mod: 26

## 2018-08-19 PROCEDURE — 74177 CT ABD & PELVIS W/CONTRAST: CPT | Mod: 26

## 2018-08-19 PROCEDURE — 93010 ELECTROCARDIOGRAM REPORT: CPT

## 2018-08-19 PROCEDURE — 99285 EMERGENCY DEPT VISIT HI MDM: CPT | Mod: 25

## 2018-08-19 RX ORDER — SODIUM CHLORIDE 9 MG/ML
2000 INJECTION, SOLUTION INTRAVENOUS ONCE
Qty: 0 | Refills: 0 | Status: DISCONTINUED | OUTPATIENT
Start: 2018-08-19 | End: 2018-08-19

## 2018-08-19 RX ORDER — SODIUM CHLORIDE 9 MG/ML
1000 INJECTION, SOLUTION INTRAVENOUS
Qty: 0 | Refills: 0 | Status: COMPLETED | OUTPATIENT
Start: 2018-08-19 | End: 2018-08-19

## 2018-08-19 RX ORDER — THIAMINE MONONITRATE (VIT B1) 100 MG
100 TABLET ORAL DAILY
Qty: 0 | Refills: 0 | Status: DISCONTINUED | OUTPATIENT
Start: 2018-08-19 | End: 2018-08-20

## 2018-08-19 RX ORDER — FAMOTIDINE 10 MG/ML
20 INJECTION INTRAVENOUS ONCE
Qty: 0 | Refills: 0 | Status: COMPLETED | OUTPATIENT
Start: 2018-08-19 | End: 2018-08-19

## 2018-08-19 RX ORDER — HEPARIN SODIUM 5000 [USP'U]/ML
5000 INJECTION INTRAVENOUS; SUBCUTANEOUS EVERY 8 HOURS
Qty: 0 | Refills: 0 | Status: DISCONTINUED | OUTPATIENT
Start: 2018-08-19 | End: 2018-08-20

## 2018-08-19 RX ORDER — OXYCODONE AND ACETAMINOPHEN 5; 325 MG/1; MG/1
1 TABLET ORAL EVERY 4 HOURS
Qty: 0 | Refills: 0 | Status: DISCONTINUED | OUTPATIENT
Start: 2018-08-19 | End: 2018-08-20

## 2018-08-19 RX ORDER — SODIUM CHLORIDE 9 MG/ML
1000 INJECTION INTRAMUSCULAR; INTRAVENOUS; SUBCUTANEOUS
Qty: 0 | Refills: 0 | Status: DISCONTINUED | OUTPATIENT
Start: 2018-08-19 | End: 2018-08-20

## 2018-08-19 RX ORDER — OXYCODONE AND ACETAMINOPHEN 5; 325 MG/1; MG/1
1 TABLET ORAL ONCE
Qty: 0 | Refills: 0 | Status: DISCONTINUED | OUTPATIENT
Start: 2018-08-19 | End: 2018-08-19

## 2018-08-19 RX ORDER — MORPHINE SULFATE 50 MG/1
4 CAPSULE, EXTENDED RELEASE ORAL ONCE
Qty: 0 | Refills: 0 | Status: DISCONTINUED | OUTPATIENT
Start: 2018-08-19 | End: 2018-08-19

## 2018-08-19 RX ORDER — FOLIC ACID 0.8 MG
1 TABLET ORAL DAILY
Qty: 0 | Refills: 0 | Status: DISCONTINUED | OUTPATIENT
Start: 2018-08-19 | End: 2018-08-20

## 2018-08-19 RX ORDER — SODIUM CHLORIDE 9 MG/ML
1000 INJECTION, SOLUTION INTRAVENOUS ONCE
Qty: 0 | Refills: 0 | Status: COMPLETED | OUTPATIENT
Start: 2018-08-19 | End: 2018-08-19

## 2018-08-19 RX ADMIN — FAMOTIDINE 20 MILLIGRAM(S): 10 INJECTION INTRAVENOUS at 10:38

## 2018-08-19 RX ADMIN — SODIUM CHLORIDE 80 MILLILITER(S): 9 INJECTION INTRAMUSCULAR; INTRAVENOUS; SUBCUTANEOUS at 16:06

## 2018-08-19 RX ADMIN — OXYCODONE AND ACETAMINOPHEN 1 TABLET(S): 5; 325 TABLET ORAL at 21:56

## 2018-08-19 RX ADMIN — MORPHINE SULFATE 4 MILLIGRAM(S): 50 CAPSULE, EXTENDED RELEASE ORAL at 11:30

## 2018-08-19 RX ADMIN — SODIUM CHLORIDE 1000 MILLILITER(S): 9 INJECTION, SOLUTION INTRAVENOUS at 10:38

## 2018-08-19 RX ADMIN — MORPHINE SULFATE 4 MILLIGRAM(S): 50 CAPSULE, EXTENDED RELEASE ORAL at 12:01

## 2018-08-19 RX ADMIN — Medication 2 MILLIGRAM(S): at 12:56

## 2018-08-19 RX ADMIN — OXYCODONE AND ACETAMINOPHEN 1 TABLET(S): 5; 325 TABLET ORAL at 22:20

## 2018-08-19 RX ADMIN — OXYCODONE AND ACETAMINOPHEN 1 TABLET(S): 5; 325 TABLET ORAL at 17:12

## 2018-08-19 RX ADMIN — OXYCODONE AND ACETAMINOPHEN 1 TABLET(S): 5; 325 TABLET ORAL at 18:12

## 2018-08-19 RX ADMIN — SODIUM CHLORIDE 80 MILLILITER(S): 9 INJECTION INTRAMUSCULAR; INTRAVENOUS; SUBCUTANEOUS at 12:09

## 2018-08-19 RX ADMIN — SODIUM CHLORIDE 500 MILLILITER(S): 9 INJECTION, SOLUTION INTRAVENOUS at 08:48

## 2018-08-19 RX ADMIN — Medication 2 MILLIGRAM(S): at 18:06

## 2018-08-19 RX ADMIN — Medication 2 MILLIGRAM(S): at 21:57

## 2018-08-19 RX ADMIN — SODIUM CHLORIDE 1000 MILLILITER(S): 9 INJECTION, SOLUTION INTRAVENOUS at 07:33

## 2018-08-19 RX ADMIN — FAMOTIDINE 104 MILLIGRAM(S): 10 INJECTION INTRAVENOUS at 07:38

## 2018-08-19 RX ADMIN — HEPARIN SODIUM 5000 UNIT(S): 5000 INJECTION INTRAVENOUS; SUBCUTANEOUS at 21:57

## 2018-08-19 RX ADMIN — MORPHINE SULFATE 4 MILLIGRAM(S): 50 CAPSULE, EXTENDED RELEASE ORAL at 10:38

## 2018-08-19 RX ADMIN — MORPHINE SULFATE 4 MILLIGRAM(S): 50 CAPSULE, EXTENDED RELEASE ORAL at 11:46

## 2018-08-19 NOTE — ED ADULT NURSE REASSESSMENT NOTE - NS ED NURSE REASSESS COMMENT FT1
pt c/o generalized body burning, pt states drinks daily, pt smell of alcohol, tremors noted, last drink 2 days ago. pt awake, alert, orient x 3,

## 2018-08-19 NOTE — ED PROVIDER NOTE - MEDICAL DECISION MAKING DETAILS
patient pw abd pain and cp likely gastritis or pancreatitis from etoh abuse. will check labs, hydrate and possibly scan (though had neg ct approx 1 month ago)

## 2018-08-19 NOTE — ED PROVIDER NOTE - OBJECTIVE STATEMENT
Pertinent PMH/PSH/FHx/SHx and Review of Systems contained within:  53M hx of alcohol abuse pw chest and abd pain after episode of binge drinking 2 days ago. It has been getting worse. he notes the pain is burning in the abdomen and chest. he has not been vomiting but feels a bit nauseated. He has not fallen. No ha, vision change, rhinorrhea, cough, bleeding, rash, or depression. Patient notes he cannot control his alcoholism. He has not taken anything for symptoms   Fh and Sh not otherwise contributory  ROS otherwise negative

## 2018-08-19 NOTE — ED ADULT NURSE NOTE - NSIMPLEMENTINTERV_GEN_ALL_ED
Implemented All Universal Safety Interventions:  [place] to call system. Call bell, personal items and telephone within reach. Instruct patient to call for assistance. Room bathroom lighting operational. Non-slip footwear when patient is off stretcher. Physically safe environment: no spills, clutter or unnecessary equipment. Stretcher in lowest position, wheels locked, appropriate side rails in place.

## 2018-08-19 NOTE — CHART NOTE - NSCHARTNOTEFT_GEN_A_CORE
Asked to place bridge orders.  Patient is 54 y/o M with h/o of alcohol abuse who presents with 2 day history of abdominal pain with nausea & vomiting.  Reports pain is all over his abdomen but mainly in epigastric region with radiation to his back.  Patient states his last drink was 2 days ago.  Denies headache, fever, chills, CP, SOB, palpitations or falls.    Vital Signs Last 24 Hrs  T(C): 36.9 (19 Aug 2018 12:07), Max: 36.9 (19 Aug 2018 12:07)  T(F): 98.4 (19 Aug 2018 12:07), Max: 98.4 (19 Aug 2018 12:07)  HR: 77 (19 Aug 2018 12:07) (74 - 102)  BP: 129/75 (19 Aug 2018 12:07) (120/81 - 130/90)  BP(mean): --  RR: 19 (19 Aug 2018 12:07) (13 - 19)  SpO2: 95% (19 Aug 2018 12:07) (95% - 99%)    General: NAD  Lungs: CTAB  Cardiac: Clear S1, S2  Abdomen: + BS, soft, diffusely tender, no rebound or guarding  Ext: no edema                          14.7   3.19  )-----------( 196      ( 19 Aug 2018 07:21 )             44.7   08-19    142  |  104  |  10  ----------------------------<  98  3.8   |  25  |  0.82    Ca    8.7      19 Aug 2018 07:21  Phos  3.0     08-19  Mg     2.1     08-19    TPro  8.1  /  Alb  3.8  /  TBili  0.7  /  DBili  x   /  AST  49<H>  /  ALT  42  /  AlkPhos  54  08-19    Lipase, Serum: 505 U/L (08.19.18 @ 07:21)    Alcohol, Blood: 226    Xray Chest 1 View AP/PA. (08.19.18 @ 08:45) > Clear lungs    CT Abdomen and Pelvis w/ IV Cont (08.19.18 @ 11:02) > Hepatic steatosis.    A/P:  54 y/o M with PMHx of alcohol abuse admitted with abdominal pain and vomiting  -Pain management  -CIWA protocol initiated  -IVF  -Full H & P and orders to follow by Dr Mcclellan

## 2018-08-19 NOTE — H&P ADULT - NSHPLABSRESULTS_GEN_ALL_CORE
CBC Full  -  ( 19 Aug 2018 07:21 )  WBC Count : 3.19 K/uL  Hemoglobin : 14.7 g/dL  Hematocrit : 44.7 %  Platelet Count - Automated : 196 K/uL  Mean Cell Volume : 84.7 fl  Mean Cell Hemoglobin : 27.8 pg  Mean Cell Hemoglobin Concentration : 32.9 gm/dL  Auto Neutrophil # : 0.86 K/uL  Auto Lymphocyte # : 1.88 K/uL  Auto Monocyte # : 0.27 K/uL  Auto Eosinophil # : 0.14 K/uL  Auto Basophil # : 0.04 K/uL  Auto Neutrophil % : 26.9 %  Auto Lymphocyte % : 58.9 %  Auto Monocyte % : 8.5 %  Auto Eosinophil % : 4.4 %  Auto Basophil % : 1.3 %  19 Aug 2018 07:21    142    |  104    |  10     ----------------------------<  98     3.8     |  25     |  0.82     Ca    8.7        19 Aug 2018 07:21  Phos  3.0       19 Aug 2018 07:21  Mg     2.1       19 Aug 2018 07:21    TPro  8.1    /  Alb  3.8    /  TBili  0.7    /  DBili  x      /  AST  49     /  ALT  42     /  AlkPhos  54     19 Aug 2018 07:21  < from: CT Abdomen and Pelvis w/ IV Cont (08.19.18 @ 11:02) >      IMPRESSION:     Hepatic steatosis.    < end of copied text >

## 2018-08-19 NOTE — ED ADULT NURSE NOTE - OBJECTIVE STATEMENT
Reports for a couple of day burning sensation that radiates to the back.  Additionally pt reports N/V and diarrhea. R hand edema present.

## 2018-08-19 NOTE — ED PROVIDER NOTE - PHYSICAL EXAMINATION
Gen: appears intoxicated. NAD  Head: NC, AT   Eyes: PERRL, EOMI, normal lids/conjunctiva  ENT: normal hearing, patent oropharynx without erythema/exudate, uvula midline  Neck: supple, no tenderness, Trachea midline  Pulm: Bilateral BS, normal resp effort, no wheeze/stridor/retractions  CV: RRR, no M/R/G, 2+ radial and dp pulses bl, no edema  Abd: soft, NT/ND, +BS, no hepatosplenomegaly  Mskel: extremities x4 with normal ROM and no joint effusions. no ctl spine ttp.   Skin: no rash, no bruising   Neuro: AAOx3, no sensory/motor deficits, CN 2-12 intact

## 2018-08-19 NOTE — H&P ADULT - HISTORY OF PRESENT ILLNESS
53M hx of alcohol abuse pw chest and abd pain after episode of binge drinking 2 days ago. It has been getting worse. he notes the pain is burning in the abdomen and chest. he has not been vomiting but feels a bit nauseated. He has not fallen. No ha, vision change, rhinorrhea, cough, bleeding, rash, or depression. Patient notes he cannot control his alcoholism. He has not taken anything for symptoms

## 2018-08-20 ENCOUNTER — TRANSCRIPTION ENCOUNTER (OUTPATIENT)
Age: 53
End: 2018-08-20

## 2018-08-20 VITALS
OXYGEN SATURATION: 99 % | TEMPERATURE: 97 F | RESPIRATION RATE: 18 BRPM | SYSTOLIC BLOOD PRESSURE: 132 MMHG | DIASTOLIC BLOOD PRESSURE: 93 MMHG | HEART RATE: 87 BPM

## 2018-08-20 LAB
ANION GAP SERPL CALC-SCNC: 9 MMOL/L — SIGNIFICANT CHANGE UP (ref 5–17)
BUN SERPL-MCNC: 7 MG/DL — SIGNIFICANT CHANGE UP (ref 7–23)
CALCIUM SERPL-MCNC: 8 MG/DL — LOW (ref 8.5–10.1)
CHLORIDE SERPL-SCNC: 104 MMOL/L — SIGNIFICANT CHANGE UP (ref 96–108)
CO2 SERPL-SCNC: 25 MMOL/L — SIGNIFICANT CHANGE UP (ref 22–31)
CREAT SERPL-MCNC: 0.68 MG/DL — SIGNIFICANT CHANGE UP (ref 0.5–1.3)
GLUCOSE SERPL-MCNC: 87 MG/DL — SIGNIFICANT CHANGE UP (ref 70–99)
MAGNESIUM SERPL-MCNC: 1.7 MG/DL — SIGNIFICANT CHANGE UP (ref 1.6–2.6)
PHOSPHATE SERPL-MCNC: 2.3 MG/DL — LOW (ref 2.5–4.5)
POTASSIUM SERPL-MCNC: 3.5 MMOL/L — SIGNIFICANT CHANGE UP (ref 3.5–5.3)
POTASSIUM SERPL-SCNC: 3.5 MMOL/L — SIGNIFICANT CHANGE UP (ref 3.5–5.3)
SODIUM SERPL-SCNC: 138 MMOL/L — SIGNIFICANT CHANGE UP (ref 135–145)

## 2018-08-20 PROCEDURE — 99231 SBSQ HOSP IP/OBS SF/LOW 25: CPT

## 2018-08-20 RX ORDER — FOLIC ACID 0.8 MG
1 TABLET ORAL
Qty: 30 | Refills: 0
Start: 2018-08-20 | End: 2018-09-18

## 2018-08-20 RX ORDER — THIAMINE MONONITRATE (VIT B1) 100 MG
1 TABLET ORAL
Qty: 30 | Refills: 0 | OUTPATIENT
Start: 2018-08-20 | End: 2018-09-18

## 2018-08-20 RX ADMIN — Medication 1 MILLIGRAM(S): at 11:04

## 2018-08-20 RX ADMIN — Medication 2 MILLIGRAM(S): at 06:01

## 2018-08-20 RX ADMIN — HEPARIN SODIUM 5000 UNIT(S): 5000 INJECTION INTRAVENOUS; SUBCUTANEOUS at 06:01

## 2018-08-20 RX ADMIN — Medication 1 TABLET(S): at 11:04

## 2018-08-20 RX ADMIN — Medication 2 MILLIGRAM(S): at 02:27

## 2018-08-20 RX ADMIN — Medication 100 MILLIGRAM(S): at 11:59

## 2018-08-20 RX ADMIN — Medication 1.5 MILLIGRAM(S): at 11:05

## 2018-08-20 NOTE — CHART NOTE - NSCHARTNOTEFT_GEN_A_CORE
House- Medicine NP:    Patient is a 53y old  Male who presents with a chief complaint of etoh abuse (20 Aug 2018 12:15)   requesting to leave hospital AMA. Pt and wife at bedside explained risks of leaving AMA including worsening of symptoms and risk of death. Pt. verbalized understanding. Dr. Mcclellan notified by Katie OLSEN.

## 2018-08-20 NOTE — DISCHARGE NOTE ADULT - PLAN OF CARE
relief of withdrawal symptoms pt is on ativan taper. Signing out of hospital AMA. Instructed to stop drinking alcohol. cont mvi, thiamine, folic acid

## 2018-08-20 NOTE — DISCHARGE NOTE ADULT - HAS THE PATIENT RECEIVED THE INFLUENZA VACCINE DURING THIS VISIT
Arrangements have been made for you to be admitted to:    Same Day Interventional Services @  [facility]  [address]  [place], [place]    Procedure: Left lower extremity angiogram with possible intervention    Date:   Wednesday, Sept 6    Check-in time: 8:30 am    Procedure time: 11:30 am      · Please arrive at the main entrance, take the elevator to the first floor and proceed to Same Day Interventional Services.     · Please make arrangements for someone to drive you home. Your procedure will be cancelled if you fail to make prior arrangements for a .     · Please do not have anything to eat or drink after midnight. You may have a small amount of water with your medication. Please see exceptions below.     · If you are taking Coumadin (Warfarin) stop taking it 4 days before procedure. Stop Pradaxa 2 days before procedure. Stop Xarelto / Eliquis 1 day before procedure. You may continue taking aspirin / Plavix.     · Hold insulin the morning of your procedure.    · If you are taking Glucophage, Metformin, Glucovance, Avandamet, Actoplus met, Janumet stop taking it the day before the procedure.     · Hold diuretic (water pill) the morning of procedure.       If you have any questions or concerns, do not hesitate to contact us at [phone] or [phone]    Sincerely,    Office of Dr. Vin Young           No

## 2018-08-20 NOTE — DISCHARGE NOTE ADULT - PATIENT PORTAL LINK FT
You can access the [facility] Patient Portal, offered by [facility], by registering with the following website: [email or web address]

## 2018-08-20 NOTE — DISCHARGE NOTE ADULT - MEDICATION SUMMARY - MEDICATIONS TO TAKE
I will START or STAY ON the medications listed below when I get home from the hospital:    Multiple Vitamins oral tablet  -- 1 tab(s) by mouth once a day  -- Indication: For ALCOHOLISM, ALCOHOL ABUSE    folic acid 1 mg oral tablet  -- 1 tab(s) by mouth once a day  -- Indication: For ALCOHOLISM, ALCOHOL ABUSE    thiamine 100 mg oral tablet  -- 1 tab(s) by mouth once a day  -- Indication: For ALCOHOLISM, ALCOHOL ABUSE

## 2018-08-20 NOTE — DISCHARGE NOTE ADULT - CARE PLAN
Principal Discharge DX:	Alcoholism /alcohol abuse  Goal:	relief of withdrawal symptoms  Assessment and plan of treatment:	pt is on ativan taper. Signing out of hospital AMA. Instructed to stop drinking alcohol. cont mvi, thiamine, folic acid

## 2018-08-20 NOTE — DISCHARGE NOTE ADULT - CARE PROVIDER_API CALL
Portillo Mcclellan), Internal Medicine  [address] [place] [address]  Phone: [phone]  Fax: [phone]

## 2018-08-20 NOTE — DISCHARGE NOTE ADULT - HOSPITAL COURSE
53M hx of alcohol abuse pw chest and abd pain after episode of binge drinking 2 days ago. It has been getting worse. he notes the pain is burning in the abdomen and chest. he has not been vomiting but feels a bit nauseated. He has not fallen. No ha, vision change, rhinorrhea, cough, bleeding, rash, or depression. Patient notes he cannot control his alcoholism. He has not taken anything for symptoms   In hospital pt on ativan taper. requesting to sign out of hospital AMA. pt and wife encouraged to stay in the hospital, pt refused and stated he will f/u with his PMD

## 2018-08-23 DIAGNOSIS — K27.9 PEPTIC ULCER, SITE UNSPECIFIED, UNSPECIFIED AS ACUTE OR CHRONIC, WITHOUT HEMORRHAGE OR PERFORATION: ICD-10-CM

## 2018-08-23 DIAGNOSIS — F10.20 ALCOHOL DEPENDENCE, UNCOMPLICATED: ICD-10-CM

## 2018-08-23 DIAGNOSIS — N18.2 CHRONIC KIDNEY DISEASE, STAGE 2 (MILD): ICD-10-CM

## 2018-09-04 ENCOUNTER — EMERGENCY (EMERGENCY)
Facility: HOSPITAL | Age: 53
LOS: 0 days | Discharge: HOME | End: 2018-09-05
Attending: EMERGENCY MEDICINE
Payer: MEDICAID

## 2018-09-04 VITALS
HEIGHT: 67 IN | DIASTOLIC BLOOD PRESSURE: 98 MMHG | SYSTOLIC BLOOD PRESSURE: 125 MMHG | TEMPERATURE: 98 F | RESPIRATION RATE: 20 BRPM | HEART RATE: 102 BPM | OXYGEN SATURATION: 97 % | WEIGHT: 179.9 LBS

## 2018-09-04 DIAGNOSIS — K29.70 GASTRITIS, UNSPECIFIED, WITHOUT BLEEDING: ICD-10-CM

## 2018-09-04 DIAGNOSIS — F10.10 ALCOHOL ABUSE, UNCOMPLICATED: ICD-10-CM

## 2018-09-04 DIAGNOSIS — E78.5 HYPERLIPIDEMIA, UNSPECIFIED: ICD-10-CM

## 2018-09-04 DIAGNOSIS — R07.9 CHEST PAIN, UNSPECIFIED: ICD-10-CM

## 2018-09-04 LAB
ALBUMIN SERPL ELPH-MCNC: 4 G/DL — SIGNIFICANT CHANGE UP (ref 3.3–5)
ALP SERPL-CCNC: 52 U/L — SIGNIFICANT CHANGE UP (ref 40–120)
ALT FLD-CCNC: 70 U/L — SIGNIFICANT CHANGE UP (ref 12–78)
ANION GAP SERPL CALC-SCNC: 16 MMOL/L — SIGNIFICANT CHANGE UP (ref 5–17)
AST SERPL-CCNC: 113 U/L — HIGH (ref 15–37)
BASOPHILS # BLD AUTO: 0.05 K/UL — SIGNIFICANT CHANGE UP (ref 0–0.2)
BASOPHILS NFR BLD AUTO: 1.5 % — SIGNIFICANT CHANGE UP (ref 0–2)
BILIRUB SERPL-MCNC: 0.4 MG/DL — SIGNIFICANT CHANGE UP (ref 0.2–1.2)
BUN SERPL-MCNC: 10 MG/DL — SIGNIFICANT CHANGE UP (ref 7–23)
CALCIUM SERPL-MCNC: 8.6 MG/DL — SIGNIFICANT CHANGE UP (ref 8.5–10.1)
CHLORIDE SERPL-SCNC: 106 MMOL/L — SIGNIFICANT CHANGE UP (ref 96–108)
CK MB CFR SERPL CALC: <1 NG/ML — SIGNIFICANT CHANGE UP (ref 0.5–3.6)
CO2 SERPL-SCNC: 22 MMOL/L — SIGNIFICANT CHANGE UP (ref 22–31)
CREAT SERPL-MCNC: 0.8 MG/DL — SIGNIFICANT CHANGE UP (ref 0.5–1.3)
EOSINOPHIL # BLD AUTO: 0.1 K/UL — SIGNIFICANT CHANGE UP (ref 0–0.5)
EOSINOPHIL NFR BLD AUTO: 3 % — SIGNIFICANT CHANGE UP (ref 0–6)
ETHANOL SERPL-MCNC: 379 MG/DL — HIGH (ref 0–10)
GLUCOSE SERPL-MCNC: 85 MG/DL — SIGNIFICANT CHANGE UP (ref 70–99)
HCT VFR BLD CALC: 45.7 % — SIGNIFICANT CHANGE UP (ref 39–50)
HGB BLD-MCNC: 15.1 G/DL — SIGNIFICANT CHANGE UP (ref 13–17)
IMM GRANULOCYTES NFR BLD AUTO: 0 % — SIGNIFICANT CHANGE UP (ref 0–1.5)
LIDOCAIN IGE QN: 590 U/L — HIGH (ref 73–393)
LYMPHOCYTES # BLD AUTO: 2.08 K/UL — SIGNIFICANT CHANGE UP (ref 1–3.3)
LYMPHOCYTES # BLD AUTO: 62.7 % — HIGH (ref 13–44)
MAGNESIUM SERPL-MCNC: 2.2 MG/DL — SIGNIFICANT CHANGE UP (ref 1.6–2.6)
MCHC RBC-ENTMCNC: 27.9 PG — SIGNIFICANT CHANGE UP (ref 27–34)
MCHC RBC-ENTMCNC: 33 GM/DL — SIGNIFICANT CHANGE UP (ref 32–36)
MCV RBC AUTO: 84.5 FL — SIGNIFICANT CHANGE UP (ref 80–100)
MONOCYTES # BLD AUTO: 0.29 K/UL — SIGNIFICANT CHANGE UP (ref 0–0.9)
MONOCYTES NFR BLD AUTO: 8.7 % — SIGNIFICANT CHANGE UP (ref 2–14)
NEUTROPHILS # BLD AUTO: 0.8 K/UL — LOW (ref 1.8–7.4)
NEUTROPHILS NFR BLD AUTO: 24.1 % — LOW (ref 43–77)
PHOSPHATE SERPL-MCNC: 3.2 MG/DL — SIGNIFICANT CHANGE UP (ref 2.5–4.5)
PLATELET # BLD AUTO: 149 K/UL — LOW (ref 150–400)
POTASSIUM SERPL-MCNC: 4.1 MMOL/L — SIGNIFICANT CHANGE UP (ref 3.5–5.3)
POTASSIUM SERPL-SCNC: 4.1 MMOL/L — SIGNIFICANT CHANGE UP (ref 3.5–5.3)
PROT SERPL-MCNC: 8.5 GM/DL — HIGH (ref 6–8.3)
RBC # BLD: 5.41 M/UL — SIGNIFICANT CHANGE UP (ref 4.2–5.8)
RBC # FLD: 14.8 % — HIGH (ref 10.3–14.5)
SODIUM SERPL-SCNC: 144 MMOL/L — SIGNIFICANT CHANGE UP (ref 135–145)
TROPONIN I SERPL-MCNC: <.015 NG/ML — SIGNIFICANT CHANGE UP (ref 0.01–0.04)
WBC # BLD: 3.32 K/UL — LOW (ref 3.8–10.5)
WBC # FLD AUTO: 3.32 K/UL — LOW (ref 3.8–10.5)

## 2018-09-04 PROCEDURE — 71045 X-RAY EXAM CHEST 1 VIEW: CPT | Mod: 26

## 2018-09-04 PROCEDURE — 99285 EMERGENCY DEPT VISIT HI MDM: CPT

## 2018-09-04 RX ORDER — FAMOTIDINE 10 MG/ML
20 INJECTION INTRAVENOUS ONCE
Qty: 0 | Refills: 0 | Status: COMPLETED | OUTPATIENT
Start: 2018-09-04 | End: 2018-09-04

## 2018-09-04 RX ORDER — SODIUM CHLORIDE 9 MG/ML
1000 INJECTION INTRAMUSCULAR; INTRAVENOUS; SUBCUTANEOUS ONCE
Qty: 0 | Refills: 0 | Status: COMPLETED | OUTPATIENT
Start: 2018-09-04 | End: 2018-09-04

## 2018-09-04 RX ORDER — SODIUM CHLORIDE 9 MG/ML
1000 INJECTION, SOLUTION INTRAVENOUS
Qty: 0 | Refills: 0 | Status: COMPLETED | OUTPATIENT
Start: 2018-09-04 | End: 2018-09-04

## 2018-09-04 RX ADMIN — SODIUM CHLORIDE 125 MILLILITER(S): 9 INJECTION, SOLUTION INTRAVENOUS at 22:21

## 2018-09-04 RX ADMIN — FAMOTIDINE 20 MILLIGRAM(S): 10 INJECTION INTRAVENOUS at 22:02

## 2018-09-04 RX ADMIN — SODIUM CHLORIDE 1000 MILLILITER(S): 9 INJECTION INTRAMUSCULAR; INTRAVENOUS; SUBCUTANEOUS at 22:21

## 2018-09-04 RX ADMIN — SODIUM CHLORIDE 2000 MILLILITER(S): 9 INJECTION INTRAMUSCULAR; INTRAVENOUS; SUBCUTANEOUS at 21:50

## 2018-09-04 RX ADMIN — FAMOTIDINE 104 MILLIGRAM(S): 10 INJECTION INTRAVENOUS at 21:50

## 2018-09-04 NOTE — ED ADULT NURSE NOTE - CHIEF COMPLAINT QUOTE
Pt c/o chest pain and Abd pain x 1 wk. states he has been drinking about 1 bottle vodka per day x 1 wks. feels weak. family states he has not been eating lately.  see CECILIA (8) . last drink at 1800pm

## 2018-09-04 NOTE — ED ADULT NURSE NOTE - OBJECTIVE STATEMENT
53M awake and alert presented to ED with c/o abd pain and chest discomfort with loss of appetite and had been continuously drinking for a week now. Patient reports drinking vodka to make him sleep, havent had eaten regularly secondary to abd pain. CIWA score of 8 made by triage RN. Patient a little anxious, no tremors noted, no headache but c/o nausea, vomiting and abd pain.

## 2018-09-04 NOTE — ED PROVIDER NOTE - PHYSICAL EXAMINATION
Gen: awake, alert, very mild tremor.   HEENT: Mucous membranes moist, pink conjunctivae, EOMI. mild tongue fasiculations.   CV: RRR, nl s1/s2.  Resp: CTAB, normal rate and effort  GI: abd soft, voluntary guarding that is distractible, no focal ttp. no rebound/guarding.   : No CVAT  Neuro: awake, moving all extremities, follows commands  MSK: No spine or joint tenderness to palpation  Skin: No rashes. intact and perfused.

## 2018-09-04 NOTE — ED PROVIDER NOTE - OBJECTIVE STATEMENT
52 y/o male hx etoh abuse, admitted for ativan taper 8/20 here for chest pain/epigastric pain s/p 1 week of binge drinking typical of his usual pain he gets with drinking. 1 pint vodka daily for past 7 days, last drink 2-3 hours PTA. States feels weak and "pain all over." Not great historian, denies f/c/n/v. no hematemesis/brbpr/melena.    limited ros given poor historian. 54 y/o male hx etoh abuse, admitted for ativan taper 8/20 here for chest pain/epigastric pain s/p 1 week of binge drinking typical of his usual pain he gets with drinking. 1 pint vodka daily for past 7 days, last drink 2-3 hours PTA. States feels weak and "pain all over." Not great historian/intoxicated denies f/c/n/v. no hematemesis/brbpr/melena.    limited ros given poor historian.

## 2018-09-04 NOTE — ED ADULT TRIAGE NOTE - CHIEF COMPLAINT QUOTE
Pt c/o chest pain and Abd pain x 1 wk. states he has been drinking about 1 bottle vodka per day x 1 wks. feels weak. family states he has not been eating lately.  see CECILIA Pt c/o chest pain and Abd pain x 1 wk. states he has been drinking about 1 bottle vodka per day x 1 wks. feels weak. family states he has not been eating lately.  see CECILIA (8) . last drink at 1800pm

## 2018-09-04 NOTE — ED PROVIDER NOTE - PROGRESS NOTE DETAILS
Guevara: pt sleeping comfortably, pulse ~80, bp ~115/70. Ismael: pt observed for ~9 hours, no sign withdrawal, clinically sober, pain improved with minor abd pain likely gastritis. sent pepcid and short course librium to pharmacy and given dose of librium just prior to leaving. HD stable with pulse in 80's and bp wnl. stable for d/c. given resources for detox/rehab. no withdrawal and ciwa 0 at d/c. lipase around pt's baseline, no acute pancreatitis.

## 2018-09-04 NOTE — ED PROVIDER NOTE - MEDICAL DECISION MAKING DETAILS
etoh abuse/ with intox/early withdrawal with potential for significant withdrawal. likely gastritis pain. labs, meds, valium, fluids, vitamins, reassess.

## 2018-09-05 VITALS
RESPIRATION RATE: 16 BRPM | SYSTOLIC BLOOD PRESSURE: 105 MMHG | OXYGEN SATURATION: 98 % | HEART RATE: 88 BPM | DIASTOLIC BLOOD PRESSURE: 64 MMHG | TEMPERATURE: 98 F

## 2018-09-05 PROCEDURE — 74177 CT ABD & PELVIS W/CONTRAST: CPT | Mod: 26

## 2018-09-05 RX ORDER — FAMOTIDINE 10 MG/ML
1 INJECTION INTRAVENOUS
Qty: 10 | Refills: 0 | OUTPATIENT
Start: 2018-09-05

## 2018-09-05 RX ORDER — MORPHINE SULFATE 50 MG/1
4 CAPSULE, EXTENDED RELEASE ORAL ONCE
Qty: 0 | Refills: 0 | Status: DISCONTINUED | OUTPATIENT
Start: 2018-09-05 | End: 2018-09-05

## 2018-09-05 RX ADMIN — Medication 25 MILLIGRAM(S): at 05:12

## 2018-09-05 RX ADMIN — MORPHINE SULFATE 4 MILLIGRAM(S): 50 CAPSULE, EXTENDED RELEASE ORAL at 02:11

## 2018-09-05 RX ADMIN — MORPHINE SULFATE 4 MILLIGRAM(S): 50 CAPSULE, EXTENDED RELEASE ORAL at 00:54

## 2018-09-05 RX ADMIN — SODIUM CHLORIDE 1000 MILLILITER(S): 9 INJECTION, SOLUTION INTRAVENOUS at 05:11

## 2018-09-05 NOTE — ED ADULT NURSE REASSESSMENT NOTE - NS ED NURSE REASSESS COMMENT FT1
Patient in stable condition and able to walk steadily in the ED. DC home and advised to take meds as prescribed and stop drinking alcohol and seek detox treatment for alcohol abuse and verbalized understanding.
Patient remains stable while in the ED, sleeping in stretcher, VS wnl. Pending dc in am as per MD. No signs of alcohol withdrawal noted.

## 2018-10-31 ENCOUNTER — INPATIENT (INPATIENT)
Facility: HOSPITAL | Age: 53
LOS: 1 days | Discharge: HOME | End: 2018-11-02
Attending: INTERNAL MEDICINE | Admitting: INTERNAL MEDICINE
Payer: MEDICAID

## 2018-10-31 VITALS
HEART RATE: 80 BPM | TEMPERATURE: 97 F | DIASTOLIC BLOOD PRESSURE: 83 MMHG | SYSTOLIC BLOOD PRESSURE: 143 MMHG | RESPIRATION RATE: 17 BRPM | WEIGHT: 160.06 LBS | HEIGHT: 66 IN | OXYGEN SATURATION: 97 %

## 2018-10-31 LAB
ALBUMIN SERPL ELPH-MCNC: 4.1 G/DL — SIGNIFICANT CHANGE UP (ref 3.3–5)
ALP SERPL-CCNC: 47 U/L — SIGNIFICANT CHANGE UP (ref 40–120)
ALT FLD-CCNC: 46 U/L — SIGNIFICANT CHANGE UP (ref 12–78)
ANION GAP SERPL CALC-SCNC: 14 MMOL/L — SIGNIFICANT CHANGE UP (ref 5–17)
APTT BLD: 30.3 SEC — SIGNIFICANT CHANGE UP (ref 27.5–36.3)
AST SERPL-CCNC: 46 U/L — HIGH (ref 15–37)
BASOPHILS # BLD AUTO: 0.07 K/UL — SIGNIFICANT CHANGE UP (ref 0–0.2)
BASOPHILS NFR BLD AUTO: 1.4 % — SIGNIFICANT CHANGE UP (ref 0–2)
BILIRUB SERPL-MCNC: 0.6 MG/DL — SIGNIFICANT CHANGE UP (ref 0.2–1.2)
BUN SERPL-MCNC: 9 MG/DL — SIGNIFICANT CHANGE UP (ref 7–23)
CALCIUM SERPL-MCNC: 8.4 MG/DL — LOW (ref 8.5–10.1)
CHLORIDE SERPL-SCNC: 107 MMOL/L — SIGNIFICANT CHANGE UP (ref 96–108)
CK MB BLD-MCNC: 0.3 % — SIGNIFICANT CHANGE UP (ref 0–3.5)
CK MB CFR SERPL CALC: 1.8 NG/ML — SIGNIFICANT CHANGE UP (ref 0.5–3.6)
CK SERPL-CCNC: 604 U/L — HIGH (ref 26–308)
CO2 SERPL-SCNC: 22 MMOL/L — SIGNIFICANT CHANGE UP (ref 22–31)
CREAT SERPL-MCNC: 1.12 MG/DL — SIGNIFICANT CHANGE UP (ref 0.5–1.3)
EOSINOPHIL # BLD AUTO: 0.11 K/UL — SIGNIFICANT CHANGE UP (ref 0–0.5)
EOSINOPHIL NFR BLD AUTO: 2.1 % — SIGNIFICANT CHANGE UP (ref 0–6)
GLUCOSE SERPL-MCNC: 86 MG/DL — SIGNIFICANT CHANGE UP (ref 70–99)
HCT VFR BLD CALC: 48.4 % — SIGNIFICANT CHANGE UP (ref 39–50)
HGB BLD-MCNC: 16.3 G/DL — SIGNIFICANT CHANGE UP (ref 13–17)
IMM GRANULOCYTES NFR BLD AUTO: 0.2 % — SIGNIFICANT CHANGE UP (ref 0–1.5)
INR BLD: 0.97 RATIO — SIGNIFICANT CHANGE UP (ref 0.88–1.16)
LACTATE SERPL-SCNC: 5.7 MMOL/L — CRITICAL HIGH (ref 0.7–2)
LIDOCAIN IGE QN: 287 U/L — SIGNIFICANT CHANGE UP (ref 73–393)
LYMPHOCYTES # BLD AUTO: 3.56 K/UL — HIGH (ref 1–3.3)
LYMPHOCYTES # BLD AUTO: 69 % — HIGH (ref 13–44)
MAGNESIUM SERPL-MCNC: 2.2 MG/DL — SIGNIFICANT CHANGE UP (ref 1.6–2.6)
MCHC RBC-ENTMCNC: 28.5 PG — SIGNIFICANT CHANGE UP (ref 27–34)
MCHC RBC-ENTMCNC: 33.7 GM/DL — SIGNIFICANT CHANGE UP (ref 32–36)
MCV RBC AUTO: 84.6 FL — SIGNIFICANT CHANGE UP (ref 80–100)
MONOCYTES # BLD AUTO: 0.38 K/UL — SIGNIFICANT CHANGE UP (ref 0–0.9)
MONOCYTES NFR BLD AUTO: 7.4 % — SIGNIFICANT CHANGE UP (ref 2–14)
NEUTROPHILS # BLD AUTO: 1.03 K/UL — LOW (ref 1.8–7.4)
NEUTROPHILS NFR BLD AUTO: 19.9 % — LOW (ref 43–77)
NRBC # BLD: 0 /100 WBCS — SIGNIFICANT CHANGE UP (ref 0–0)
PLATELET # BLD AUTO: 237 K/UL — SIGNIFICANT CHANGE UP (ref 150–400)
POTASSIUM SERPL-MCNC: 3.9 MMOL/L — SIGNIFICANT CHANGE UP (ref 3.5–5.3)
POTASSIUM SERPL-SCNC: 3.9 MMOL/L — SIGNIFICANT CHANGE UP (ref 3.5–5.3)
PROT SERPL-MCNC: 8.2 GM/DL — SIGNIFICANT CHANGE UP (ref 6–8.3)
PROTHROM AB SERPL-ACNC: 10.8 SEC — SIGNIFICANT CHANGE UP (ref 10–12.9)
RBC # BLD: 5.72 M/UL — SIGNIFICANT CHANGE UP (ref 4.2–5.8)
RBC # FLD: 12.3 % — SIGNIFICANT CHANGE UP (ref 10.3–14.5)
SODIUM SERPL-SCNC: 143 MMOL/L — SIGNIFICANT CHANGE UP (ref 135–145)
TROPONIN I SERPL-MCNC: <.015 NG/ML — SIGNIFICANT CHANGE UP (ref 0.01–0.04)
WBC # BLD: 5.16 K/UL — SIGNIFICANT CHANGE UP (ref 3.8–10.5)
WBC # FLD AUTO: 5.16 K/UL — SIGNIFICANT CHANGE UP (ref 3.8–10.5)

## 2018-10-31 PROCEDURE — 74177 CT ABD & PELVIS W/CONTRAST: CPT | Mod: 26

## 2018-10-31 PROCEDURE — 99291 CRITICAL CARE FIRST HOUR: CPT

## 2018-10-31 RX ORDER — MORPHINE SULFATE 50 MG/1
4 CAPSULE, EXTENDED RELEASE ORAL ONCE
Qty: 0 | Refills: 0 | Status: DISCONTINUED | OUTPATIENT
Start: 2018-10-31 | End: 2018-10-31

## 2018-10-31 RX ORDER — ONDANSETRON 8 MG/1
4 TABLET, FILM COATED ORAL ONCE
Qty: 0 | Refills: 0 | Status: COMPLETED | OUTPATIENT
Start: 2018-10-31 | End: 2018-10-31

## 2018-10-31 RX ORDER — SODIUM CHLORIDE 9 MG/ML
1000 INJECTION INTRAMUSCULAR; INTRAVENOUS; SUBCUTANEOUS ONCE
Qty: 0 | Refills: 0 | Status: COMPLETED | OUTPATIENT
Start: 2018-10-31 | End: 2018-10-31

## 2018-10-31 RX ORDER — SODIUM CHLORIDE 9 MG/ML
1000 INJECTION, SOLUTION INTRAVENOUS
Qty: 0 | Refills: 0 | Status: COMPLETED | OUTPATIENT
Start: 2018-10-31 | End: 2018-10-31

## 2018-10-31 RX ORDER — LIDOCAINE 4 G/100G
10 CREAM TOPICAL ONCE
Qty: 0 | Refills: 0 | Status: COMPLETED | OUTPATIENT
Start: 2018-10-31 | End: 2018-10-31

## 2018-10-31 RX ORDER — PANTOPRAZOLE SODIUM 20 MG/1
40 TABLET, DELAYED RELEASE ORAL ONCE
Qty: 0 | Refills: 0 | Status: COMPLETED | OUTPATIENT
Start: 2018-10-31 | End: 2018-10-31

## 2018-10-31 RX ADMIN — SODIUM CHLORIDE 1000 MILLILITER(S): 9 INJECTION INTRAMUSCULAR; INTRAVENOUS; SUBCUTANEOUS at 21:26

## 2018-10-31 RX ADMIN — SODIUM CHLORIDE 250 MILLILITER(S): 9 INJECTION, SOLUTION INTRAVENOUS at 21:49

## 2018-10-31 RX ADMIN — MORPHINE SULFATE 4 MILLIGRAM(S): 50 CAPSULE, EXTENDED RELEASE ORAL at 21:26

## 2018-10-31 RX ADMIN — SODIUM CHLORIDE 1000 MILLILITER(S): 9 INJECTION INTRAMUSCULAR; INTRAVENOUS; SUBCUTANEOUS at 22:25

## 2018-10-31 RX ADMIN — PANTOPRAZOLE SODIUM 40 MILLIGRAM(S): 20 TABLET, DELAYED RELEASE ORAL at 21:26

## 2018-10-31 RX ADMIN — ONDANSETRON 4 MILLIGRAM(S): 8 TABLET, FILM COATED ORAL at 21:26

## 2018-10-31 RX ADMIN — MORPHINE SULFATE 4 MILLIGRAM(S): 50 CAPSULE, EXTENDED RELEASE ORAL at 22:16

## 2018-10-31 RX ADMIN — SODIUM CHLORIDE 2000 MILLILITER(S): 9 INJECTION INTRAMUSCULAR; INTRAVENOUS; SUBCUTANEOUS at 21:56

## 2018-10-31 RX ADMIN — Medication 30 MILLILITER(S): at 21:26

## 2018-10-31 RX ADMIN — MORPHINE SULFATE 4 MILLIGRAM(S): 50 CAPSULE, EXTENDED RELEASE ORAL at 22:40

## 2018-10-31 NOTE — ED PROVIDER NOTE - PHYSICAL EXAMINATION
Gen: Alert, c/o pain  Head: NC, AT, EOMI, normal lids/conjunctiva  ENT: normal hearing, patent oropharynx, MMM  Neck: supple, no tenderness/meningismus, FROM, Trachea midline  Pulm: Bilateral clear BS, normal resp effort, no wheeze/stridor/retractions  CV: RRR, no M/R/G, +dist pulses  Abd: soft, diffusely TTP, ND, +BS, no guarding/rebound tenderness  Mskel: no edema/erythema/cyanosis  Skin: no rash  Neuro: no sensory/motor deficits

## 2018-10-31 NOTE — ED PROVIDER NOTE - MEDICAL DECISION MAKING DETAILS
Pt w above dx.  Remaining labs remarkable for elevated EtOH.  UA +bacteria, pt given dose of abx in ED.  Pt s/p mult liters of IVF, lactate still increased, consulted e-ICU.  Pt admitted for further eval/mgmt.

## 2018-10-31 NOTE — ED PROVIDER NOTE - OBJECTIVE STATEMENT
Pertinent PMH/PSH/FHx/SHx and Review of Systems contained within:    52yo M w PMH of EtOH abuse, PUD presents to ED c/o diffuse abd pain.  Denies fever, chills, SOB, vomiting, diarrhea, constipation.  Pt admits he has only drank alcohol and has not eaten in days.    No fever/chills, No photophobia/eye pain/changes in vision, No ear pain/sore throat/dysphagia, No chest pain/palpitations, no SOB/cough/wheeze/stridor, +abdominal pain, No neck/back pain, no rash

## 2018-11-01 ENCOUNTER — OUTPATIENT (OUTPATIENT)
Dept: OUTPATIENT SERVICES | Facility: HOSPITAL | Age: 53
LOS: 1 days | End: 2018-11-01
Payer: MEDICAID

## 2018-11-01 LAB
ALBUMIN SERPL ELPH-MCNC: 2.9 G/DL — LOW (ref 3.3–5)
ALP SERPL-CCNC: 35 U/L — LOW (ref 40–120)
ALT FLD-CCNC: 28 U/L — SIGNIFICANT CHANGE UP (ref 12–78)
ANION GAP SERPL CALC-SCNC: 13 MMOL/L — SIGNIFICANT CHANGE UP (ref 5–17)
APPEARANCE UR: CLEAR — SIGNIFICANT CHANGE UP
AST SERPL-CCNC: 47 U/L — HIGH (ref 15–37)
BACTERIA # UR AUTO: ABNORMAL
BILIRUB SERPL-MCNC: 0.5 MG/DL — SIGNIFICANT CHANGE UP (ref 0.2–1.2)
BILIRUB UR-MCNC: NEGATIVE — SIGNIFICANT CHANGE UP
BUN SERPL-MCNC: 7 MG/DL — SIGNIFICANT CHANGE UP (ref 7–23)
CALCIUM SERPL-MCNC: 6.7 MG/DL — LOW (ref 8.5–10.1)
CHLORIDE SERPL-SCNC: 114 MMOL/L — HIGH (ref 96–108)
CO2 SERPL-SCNC: 18 MMOL/L — LOW (ref 22–31)
COLOR SPEC: YELLOW — SIGNIFICANT CHANGE UP
CREAT SERPL-MCNC: 0.78 MG/DL — SIGNIFICANT CHANGE UP (ref 0.5–1.3)
DIFF PNL FLD: NEGATIVE — SIGNIFICANT CHANGE UP
ETHANOL SERPL-MCNC: 15 MG/DL — HIGH (ref 0–10)
ETHANOL SERPL-MCNC: >300 MG/DL — HIGH (ref 0–10)
GLUCOSE SERPL-MCNC: 77 MG/DL — SIGNIFICANT CHANGE UP (ref 70–99)
GLUCOSE UR QL: NEGATIVE MG/DL — SIGNIFICANT CHANGE UP
HCT VFR BLD CALC: 39.2 % — SIGNIFICANT CHANGE UP (ref 39–50)
HGB BLD-MCNC: 13 G/DL — SIGNIFICANT CHANGE UP (ref 13–17)
KETONES UR-MCNC: NEGATIVE — SIGNIFICANT CHANGE UP
LACTATE SERPL-SCNC: 2.6 MMOL/L — HIGH (ref 0.7–2)
LACTATE SERPL-SCNC: 3.1 MMOL/L — HIGH (ref 0.7–2)
LACTATE SERPL-SCNC: 3.3 MMOL/L — HIGH (ref 0.7–2)
LACTATE SERPL-SCNC: 4.9 MMOL/L — CRITICAL HIGH (ref 0.7–2)
LEUKOCYTE ESTERASE UR-ACNC: NEGATIVE — SIGNIFICANT CHANGE UP
LIDOCAIN IGE QN: 273 U/L — SIGNIFICANT CHANGE UP (ref 73–393)
MAGNESIUM SERPL-MCNC: 1.8 MG/DL — SIGNIFICANT CHANGE UP (ref 1.6–2.6)
MCHC RBC-ENTMCNC: 29 PG — SIGNIFICANT CHANGE UP (ref 27–34)
MCHC RBC-ENTMCNC: 33.2 GM/DL — SIGNIFICANT CHANGE UP (ref 32–36)
MCV RBC AUTO: 87.3 FL — SIGNIFICANT CHANGE UP (ref 80–100)
NITRITE UR-MCNC: NEGATIVE — SIGNIFICANT CHANGE UP
NRBC # BLD: 0 /100 WBCS — SIGNIFICANT CHANGE UP (ref 0–0)
PH UR: 7 — SIGNIFICANT CHANGE UP (ref 5–8)
PHOSPHATE SERPL-MCNC: 2.4 MG/DL — LOW (ref 2.5–4.5)
PLATELET # BLD AUTO: 168 K/UL — SIGNIFICANT CHANGE UP (ref 150–400)
POTASSIUM SERPL-MCNC: 5 MMOL/L — SIGNIFICANT CHANGE UP (ref 3.5–5.3)
POTASSIUM SERPL-SCNC: 5 MMOL/L — SIGNIFICANT CHANGE UP (ref 3.5–5.3)
PROT SERPL-MCNC: 6.3 GM/DL — SIGNIFICANT CHANGE UP (ref 6–8.3)
PROT UR-MCNC: 15 MG/DL
RBC # BLD: 4.49 M/UL — SIGNIFICANT CHANGE UP (ref 4.2–5.8)
RBC # FLD: 12.4 % — SIGNIFICANT CHANGE UP (ref 10.3–14.5)
SODIUM SERPL-SCNC: 145 MMOL/L — SIGNIFICANT CHANGE UP (ref 135–145)
SP GR SPEC: 1 — LOW (ref 1.01–1.02)
UROBILINOGEN FLD QL: NEGATIVE MG/DL — SIGNIFICANT CHANGE UP
WBC # BLD: 3.36 K/UL — LOW (ref 3.8–10.5)
WBC # FLD AUTO: 3.36 K/UL — LOW (ref 3.8–10.5)

## 2018-11-01 PROCEDURE — G9001: CPT

## 2018-11-01 PROCEDURE — 99291 CRITICAL CARE FIRST HOUR: CPT

## 2018-11-01 RX ORDER — SODIUM CHLORIDE 9 MG/ML
1000 INJECTION, SOLUTION INTRAVENOUS
Qty: 0 | Refills: 0 | Status: DISCONTINUED | OUTPATIENT
Start: 2018-11-01 | End: 2018-11-01

## 2018-11-01 RX ORDER — INFLUENZA VIRUS VACCINE 15; 15; 15; 15 UG/.5ML; UG/.5ML; UG/.5ML; UG/.5ML
0.5 SUSPENSION INTRAMUSCULAR ONCE
Qty: 0 | Refills: 0 | Status: COMPLETED | OUTPATIENT
Start: 2018-11-01 | End: 2018-11-01

## 2018-11-01 RX ORDER — SODIUM CHLORIDE 9 MG/ML
2000 INJECTION INTRAMUSCULAR; INTRAVENOUS; SUBCUTANEOUS ONCE
Qty: 0 | Refills: 0 | Status: COMPLETED | OUTPATIENT
Start: 2018-11-01 | End: 2018-11-01

## 2018-11-01 RX ORDER — MORPHINE SULFATE 50 MG/1
2 CAPSULE, EXTENDED RELEASE ORAL ONCE
Qty: 0 | Refills: 0 | Status: DISCONTINUED | OUTPATIENT
Start: 2018-11-01 | End: 2018-11-01

## 2018-11-01 RX ORDER — DEXMEDETOMIDINE HYDROCHLORIDE IN 0.9% SODIUM CHLORIDE 4 UG/ML
0.2 INJECTION INTRAVENOUS
Qty: 200 | Refills: 0 | Status: DISCONTINUED | OUTPATIENT
Start: 2018-11-01 | End: 2018-11-01

## 2018-11-01 RX ORDER — MORPHINE SULFATE 50 MG/1
2 CAPSULE, EXTENDED RELEASE ORAL EVERY 6 HOURS
Qty: 0 | Refills: 0 | Status: DISCONTINUED | OUTPATIENT
Start: 2018-11-01 | End: 2018-11-02

## 2018-11-01 RX ORDER — PANTOPRAZOLE SODIUM 20 MG/1
40 TABLET, DELAYED RELEASE ORAL EVERY 12 HOURS
Qty: 0 | Refills: 0 | Status: DISCONTINUED | OUTPATIENT
Start: 2018-11-01 | End: 2018-11-02

## 2018-11-01 RX ORDER — CEFTRIAXONE 500 MG/1
2 INJECTION, POWDER, FOR SOLUTION INTRAMUSCULAR; INTRAVENOUS ONCE
Qty: 0 | Refills: 0 | Status: COMPLETED | OUTPATIENT
Start: 2018-11-01 | End: 2018-11-01

## 2018-11-01 RX ORDER — PHENOBARBITAL 60 MG
130 TABLET ORAL EVERY 6 HOURS
Qty: 0 | Refills: 0 | Status: DISCONTINUED | OUTPATIENT
Start: 2018-11-01 | End: 2018-11-01

## 2018-11-01 RX ORDER — SODIUM CHLORIDE 9 MG/ML
1000 INJECTION INTRAMUSCULAR; INTRAVENOUS; SUBCUTANEOUS
Qty: 0 | Refills: 0 | Status: DISCONTINUED | OUTPATIENT
Start: 2018-11-01 | End: 2018-11-02

## 2018-11-01 RX ORDER — ONDANSETRON 8 MG/1
4 TABLET, FILM COATED ORAL EVERY 6 HOURS
Qty: 0 | Refills: 0 | Status: DISCONTINUED | OUTPATIENT
Start: 2018-11-01 | End: 2018-11-02

## 2018-11-01 RX ORDER — PHENOBARBITAL 60 MG
130 TABLET ORAL
Qty: 0 | Refills: 0 | Status: DISCONTINUED | OUTPATIENT
Start: 2018-11-01 | End: 2018-11-01

## 2018-11-01 RX ORDER — CHLORHEXIDINE GLUCONATE 213 G/1000ML
1 SOLUTION TOPICAL
Qty: 0 | Refills: 0 | Status: DISCONTINUED | OUTPATIENT
Start: 2018-11-01 | End: 2018-11-01

## 2018-11-01 RX ORDER — PHENOBARBITAL 60 MG
260 TABLET ORAL
Qty: 0 | Refills: 0 | Status: DISCONTINUED | OUTPATIENT
Start: 2018-11-01 | End: 2018-11-01

## 2018-11-01 RX ORDER — HEPARIN SODIUM 5000 [USP'U]/ML
5000 INJECTION INTRAVENOUS; SUBCUTANEOUS EVERY 12 HOURS
Qty: 0 | Refills: 0 | Status: DISCONTINUED | OUTPATIENT
Start: 2018-11-01 | End: 2018-11-02

## 2018-11-01 RX ORDER — POTASSIUM PHOSPHATE, MONOBASIC POTASSIUM PHOSPHATE, DIBASIC 236; 224 MG/ML; MG/ML
15 INJECTION, SOLUTION INTRAVENOUS ONCE
Qty: 0 | Refills: 0 | Status: DISCONTINUED | OUTPATIENT
Start: 2018-11-01 | End: 2018-11-01

## 2018-11-01 RX ADMIN — Medication 2 MILLIGRAM(S): at 00:26

## 2018-11-01 RX ADMIN — DEXMEDETOMIDINE HYDROCHLORIDE IN 0.9% SODIUM CHLORIDE 3.63 MICROGRAM(S)/KG/HR: 4 INJECTION INTRAVENOUS at 09:40

## 2018-11-01 RX ADMIN — Medication 130 MILLIGRAM(S): at 11:49

## 2018-11-01 RX ADMIN — SODIUM CHLORIDE 2000 MILLILITER(S): 9 INJECTION INTRAMUSCULAR; INTRAVENOUS; SUBCUTANEOUS at 02:01

## 2018-11-01 RX ADMIN — CEFTRIAXONE 100 GRAM(S): 500 INJECTION, POWDER, FOR SOLUTION INTRAMUSCULAR; INTRAVENOUS at 03:49

## 2018-11-01 RX ADMIN — SODIUM CHLORIDE 2000 MILLILITER(S): 9 INJECTION INTRAMUSCULAR; INTRAVENOUS; SUBCUTANEOUS at 00:59

## 2018-11-01 RX ADMIN — CHLORHEXIDINE GLUCONATE 1 APPLICATION(S): 213 SOLUTION TOPICAL at 07:44

## 2018-11-01 RX ADMIN — SODIUM CHLORIDE 2000 MILLILITER(S): 9 INJECTION INTRAMUSCULAR; INTRAVENOUS; SUBCUTANEOUS at 05:12

## 2018-11-01 RX ADMIN — SODIUM CHLORIDE 75 MILLILITER(S): 9 INJECTION INTRAMUSCULAR; INTRAVENOUS; SUBCUTANEOUS at 21:21

## 2018-11-01 RX ADMIN — MORPHINE SULFATE 2 MILLIGRAM(S): 50 CAPSULE, EXTENDED RELEASE ORAL at 19:27

## 2018-11-01 RX ADMIN — MORPHINE SULFATE 2 MILLIGRAM(S): 50 CAPSULE, EXTENDED RELEASE ORAL at 07:30

## 2018-11-01 RX ADMIN — MORPHINE SULFATE 4 MILLIGRAM(S): 50 CAPSULE, EXTENDED RELEASE ORAL at 00:00

## 2018-11-01 RX ADMIN — MORPHINE SULFATE 2 MILLIGRAM(S): 50 CAPSULE, EXTENDED RELEASE ORAL at 16:15

## 2018-11-01 RX ADMIN — LIDOCAINE 10 MILLILITER(S): 4 CREAM TOPICAL at 00:25

## 2018-11-01 RX ADMIN — Medication 130 MILLIGRAM(S): at 07:39

## 2018-11-01 RX ADMIN — PANTOPRAZOLE SODIUM 40 MILLIGRAM(S): 20 TABLET, DELAYED RELEASE ORAL at 17:42

## 2018-11-01 RX ADMIN — SODIUM CHLORIDE 1000 MILLILITER(S): 9 INJECTION, SOLUTION INTRAVENOUS at 01:12

## 2018-11-01 RX ADMIN — MORPHINE SULFATE 2 MILLIGRAM(S): 50 CAPSULE, EXTENDED RELEASE ORAL at 11:59

## 2018-11-01 RX ADMIN — MORPHINE SULFATE 2 MILLIGRAM(S): 50 CAPSULE, EXTENDED RELEASE ORAL at 11:49

## 2018-11-01 RX ADMIN — MORPHINE SULFATE 2 MILLIGRAM(S): 50 CAPSULE, EXTENDED RELEASE ORAL at 16:01

## 2018-11-01 RX ADMIN — SODIUM CHLORIDE 70 MILLILITER(S): 9 INJECTION, SOLUTION INTRAVENOUS at 07:39

## 2018-11-01 RX ADMIN — HEPARIN SODIUM 5000 UNIT(S): 5000 INJECTION INTRAVENOUS; SUBCUTANEOUS at 07:39

## 2018-11-01 RX ADMIN — MORPHINE SULFATE 2 MILLIGRAM(S): 50 CAPSULE, EXTENDED RELEASE ORAL at 07:04

## 2018-11-01 RX ADMIN — HEPARIN SODIUM 5000 UNIT(S): 5000 INJECTION INTRAVENOUS; SUBCUTANEOUS at 17:42

## 2018-11-01 RX ADMIN — CEFTRIAXONE 2 GRAM(S): 500 INJECTION, POWDER, FOR SOLUTION INTRAMUSCULAR; INTRAVENOUS at 04:59

## 2018-11-01 RX ADMIN — SODIUM CHLORIDE 1000 MILLILITER(S): 9 INJECTION INTRAMUSCULAR; INTRAVENOUS; SUBCUTANEOUS at 01:12

## 2018-11-01 NOTE — H&P ADULT - ASSESSMENT
53M PMH ETOH abuse, hx of ETOH withdrawal, GERD, HLD, presents with alcohol withdrawal, alcoholic gastritis/esophagitis c/o diffuse abdominal pain   Pt admitted to the ICU for agitation and ETOH intoxication requiring precedex, with concern for lactic acidosis that is not clearing, however lactate likely type B in setting of ETOH ketoacidosis. No signs of hypoperfusion. Pt is frequently admitted to unit for ETOH withdrawal will start on CIWA protocol     1.  ETOH intoxication with agitation   -Pt started on precedex for agitation, will titrate off as needed   -Phenobarb standing and PRN for 53M PMH ETOH abuse, hx of ETOH withdrawal, GERD, HLD, presents with alcohol withdrawal, alcoholic gastritis/esophagitis c/o diffuse abdominal pain   Pt admitted to the ICU for agitation and ETOH intoxication requiring precedex, with concern for lactic acidosis that is not clearing, however lactate likely type B in setting of ETOH ketoacidosis. No signs of hypoperfusion. Pt is frequently admitted to unit for ETOH withdrawal will start on CIWA protocol     1.  ETOH intoxication with agitation   -Pt started on precedex for agitation, will titrate off as needed   -Phenobarb standing and PRN for elevated CIWA, for synergistic effect, enhancing SIMA activity and suppressing glutaminergic activity  -trend ETOH level    2.  Lactic acidosis   -elevated lactate likely 2/2 to alcoholic and metabolic ketosis  -no noted signs of hypoperfusion,  likely type B   -Trend lactate   -Diet as tolerated

## 2018-11-01 NOTE — PROVIDER CONTACT NOTE (EICU) - RECOMMENDATIONS
precedex, phenobarb for withdrawal, is on 1:1 should continue this  started protonix BID for gastritis  Got dose of ceftraixone in ED; there is no ascites on CT to suggest SBP, lactate is clearing.

## 2018-11-01 NOTE — H&P ADULT - HISTORY OF PRESENT ILLNESS
53M PMH ETOH abuse, hx of ETOH withdrawal, GERD, HLD, presents with alcohol withdrawal, alcoholic gastritis/esophagitis c/o diffuse abd pain. Pt admits he has only drank alcohol and has not eaten in days. Pt admitted to the ICU for agitation and ETOH intoxication requiring precedex, with concern for lactic acidosis that is not clearing, however lactate likely type B in setting of ETOH ketoacidosis. No signs of hypoperfusion.  Pt is frequently admitted to unit for ETOH withdrawal.

## 2018-11-01 NOTE — H&P ADULT - ATTENDING COMMENTS
pt seen and examined at the bedside this am during rounds. pt admitted with alcohol intoxication. pt currently awake and alert having breakfast. monitor CIWA, replete electrolytes.

## 2018-11-01 NOTE — H&P ADULT - NSHPLABSRESULTS_GEN_ALL_CORE
Lab Results:  CBC  CBC Full  -  ( 2018 06:20 )  WBC Count : 3.36 K/uL  Hemoglobin : 13.0 g/dL  Hematocrit : 39.2 %  Platelet Count - Automated : 168 K/uL  Mean Cell Volume : 87.3 fl  Mean Cell Hemoglobin : 29.0 pg  Mean Cell Hemoglobin Concentration : 33.2 gm/dL  Auto Neutrophil # : x  Auto Lymphocyte # : x  Auto Monocyte # : x  Auto Eosinophil # : x  Auto Basophil # : x  Auto Neutrophil % : x  Auto Lymphocyte % : x  Auto Monocyte % : x  Auto Eosinophil % : x  Auto Basophil % : x    .		Differential:	[] Automated		[] Manual  Chemistry                        13.0   3.36  )-----------( 168      ( 2018 06:20 )             39.2     11    145  |  114<H>  |  7   ----------------------------<  77  5.0   |  18<L>  |  0.78    Ca    6.7<L>      2018 06:20  Phos  2.4       Mg     1.8         TPro  6.3  /  Alb  2.9<L>  /  TBili  0.5  /  DBili  x   /  AST  47<H>  /  ALT  28  /  AlkPhos  35<L>      LIVER FUNCTIONS - ( 2018 06:20 )  Alb: 2.9 g/dL / Pro: 6.3 gm/dL / ALK PHOS: 35 U/L / ALT: 28 U/L / AST: 47 U/L / GGT: x           PT/INR - ( 31 Oct 2018 21:22 )   PT: 10.8 sec;   INR: 0.97 ratio         PTT - ( 31 Oct 2018 21:22 )  PTT:30.3 sec  Urinalysis Basic - ( 2018 01:58 )    Color: Yellow / Appearance: Clear / S.005 / pH: x  Gluc: x / Ketone: Negative  / Bili: Negative / Urobili: Negative mg/dL   Blood: x / Protein: 15 mg/dL / Nitrite: Negative   Leuk Esterase: Negative / RBC: x / WBC x   Sq Epi: x / Non Sq Epi: x / Bacteria: Occasional      MICROBIOLOGY/CULTURES:      RADIOLOGY RESULTS:

## 2018-11-01 NOTE — PROVIDER CONTACT NOTE (EICU) - BACKGROUND
54yo M w PMH of EtOH abuse, PUD presents to ED c/o diffuse abd pain.  Denies fever, chills, SOB, vomiting, diarrhea, constipation.  Pt admits he has only drank alcohol and has not eaten in days.  Put on precedex with phenobarb PRNs for ETOh withdrawal, ETOH level > 300  Ct abd essentially negative for acute pathology

## 2018-11-01 NOTE — H&P ADULT - NSHPPHYSICALEXAM_GEN_ALL_CORE
GENERAL: NAD  HEAD:  Atraumatic, Normocephalic  EYES: EOMI, PERRLA, conjunctiva and sclera clear  NERVOUS SYSTEM:  Alert & Oriented X3, Good concentration; Motor Strength 5/5 B/L upper and lower extremities; DTRs 2+ intact and symmetric  CHEST/LUNG: Clear to percussion bilaterally; No rales, rhonchi, wheezing, or rubs  HEART: Regular rate and rhythm; No murmurs, rubs, or gallops  ABDOMEN: Soft, + epigastric tenderness to palpation , Nondistended; Bowel sounds present  EXTREMITIES:  2+ Peripheral Pulses, No clubbing, cyanosis, or edema

## 2018-11-01 NOTE — H&P ADULT - NSHPREVIEWOFSYSTEMS_GEN_ALL_CORE
CONSTITUTIONAL: No fever, weight loss, or fatigue  EYES: No eye pain, visual disturbances, or discharge  RESPIRATORY: No cough, wheezing, chills or hemoptysis; No shortness of breath  CARDIOVASCULAR: No chest pain, palpitations, dizziness, or leg swelling  GASTROINTESTINAL: +abdominal or epigastric pain. + nausea, retching No hematemesis; No diarrhea or constipation. No melena or hematochezia.  GENITOURINARY: No dysuria, frequency, hematuria, or incontinence  NEUROLOGICAL: No headaches, memory loss, loss of strength, numbness, or tremors

## 2018-11-01 NOTE — CHART NOTE - NSCHARTNOTEFT_GEN_A_CORE
HPI    53M PMH ETOH abuse, hx of ETOH withdrawal, GERD, HLD, presents with alcohol withdrawal, alcoholic gastritis/esophagitis c/o diffuse abd pain. Pt admits he has only drank alcohol and has not eaten in days. Pt admitted to the ICU for agitation and ETOH intoxication requiring Precedex, with concern for lactic acidosis that is not clearing, however lactate likely type B in setting of ETOH ketoacidosis. No signs of hypoperfusion.  Pt is frequently admitted to unit for ETOH intoxication.  Precedex now off. Patient  seen and examined at the bedside this am during rounds by ICU attending.  Currently awake and alert  continue to  monitor CIWA, Ativan prn. Stable for transfer to non monitored bed.    Report given to Dr. Mi, hospitalist service    Lala Sutton NP-C  critical care

## 2018-11-02 ENCOUNTER — TRANSCRIPTION ENCOUNTER (OUTPATIENT)
Age: 53
End: 2018-11-02

## 2018-11-02 VITALS
DIASTOLIC BLOOD PRESSURE: 94 MMHG | TEMPERATURE: 98 F | SYSTOLIC BLOOD PRESSURE: 138 MMHG | OXYGEN SATURATION: 98 % | RESPIRATION RATE: 18 BRPM | HEART RATE: 73 BPM

## 2018-11-02 LAB
ANION GAP SERPL CALC-SCNC: 9 MMOL/L — SIGNIFICANT CHANGE UP (ref 5–17)
BUN SERPL-MCNC: 6 MG/DL — LOW (ref 7–23)
CALCIUM SERPL-MCNC: 8.4 MG/DL — LOW (ref 8.5–10.1)
CHLORIDE SERPL-SCNC: 105 MMOL/L — SIGNIFICANT CHANGE UP (ref 96–108)
CO2 SERPL-SCNC: 25 MMOL/L — SIGNIFICANT CHANGE UP (ref 22–31)
CREAT SERPL-MCNC: 0.79 MG/DL — SIGNIFICANT CHANGE UP (ref 0.5–1.3)
CULTURE RESULTS: NO GROWTH — SIGNIFICANT CHANGE UP
GLUCOSE SERPL-MCNC: 86 MG/DL — SIGNIFICANT CHANGE UP (ref 70–99)
HCT VFR BLD CALC: 43.9 % — SIGNIFICANT CHANGE UP (ref 39–50)
HGB BLD-MCNC: 14.4 G/DL — SIGNIFICANT CHANGE UP (ref 13–17)
MAGNESIUM SERPL-MCNC: 2.1 MG/DL — SIGNIFICANT CHANGE UP (ref 1.6–2.6)
MCHC RBC-ENTMCNC: 28.5 PG — SIGNIFICANT CHANGE UP (ref 27–34)
MCHC RBC-ENTMCNC: 32.8 GM/DL — SIGNIFICANT CHANGE UP (ref 32–36)
MCV RBC AUTO: 86.8 FL — SIGNIFICANT CHANGE UP (ref 80–100)
NRBC # BLD: 0 /100 WBCS — SIGNIFICANT CHANGE UP (ref 0–0)
PHOSPHATE SERPL-MCNC: 2.4 MG/DL — LOW (ref 2.5–4.5)
PLATELET # BLD AUTO: 154 K/UL — SIGNIFICANT CHANGE UP (ref 150–400)
POTASSIUM SERPL-MCNC: 3.9 MMOL/L — SIGNIFICANT CHANGE UP (ref 3.5–5.3)
POTASSIUM SERPL-SCNC: 3.9 MMOL/L — SIGNIFICANT CHANGE UP (ref 3.5–5.3)
RBC # BLD: 5.06 M/UL — SIGNIFICANT CHANGE UP (ref 4.2–5.8)
RBC # FLD: 11.9 % — SIGNIFICANT CHANGE UP (ref 10.3–14.5)
SODIUM SERPL-SCNC: 139 MMOL/L — SIGNIFICANT CHANGE UP (ref 135–145)
SPECIMEN SOURCE: SIGNIFICANT CHANGE UP
WBC # BLD: 3.06 K/UL — LOW (ref 3.8–10.5)
WBC # FLD AUTO: 3.06 K/UL — LOW (ref 3.8–10.5)

## 2018-11-02 PROCEDURE — 99238 HOSP IP/OBS DSCHRG MGMT 30/<: CPT

## 2018-11-02 RX ORDER — FOLIC ACID 0.8 MG
1 TABLET ORAL
Qty: 30 | Refills: 0
Start: 2018-11-02 | End: 2018-12-01

## 2018-11-02 RX ORDER — FAMOTIDINE 10 MG/ML
1 INJECTION INTRAVENOUS
Qty: 60 | Refills: 0 | OUTPATIENT
Start: 2018-11-02 | End: 2018-12-01

## 2018-11-02 RX ORDER — THIAMINE MONONITRATE (VIT B1) 100 MG
1 TABLET ORAL
Qty: 30 | Refills: 0 | OUTPATIENT
Start: 2018-11-02 | End: 2018-12-01

## 2018-11-02 RX ADMIN — Medication 2 MILLIGRAM(S): at 01:44

## 2018-11-02 RX ADMIN — MORPHINE SULFATE 2 MILLIGRAM(S): 50 CAPSULE, EXTENDED RELEASE ORAL at 01:28

## 2018-11-02 RX ADMIN — PANTOPRAZOLE SODIUM 40 MILLIGRAM(S): 20 TABLET, DELAYED RELEASE ORAL at 06:25

## 2018-11-02 RX ADMIN — MORPHINE SULFATE 2 MILLIGRAM(S): 50 CAPSULE, EXTENDED RELEASE ORAL at 01:58

## 2018-11-02 RX ADMIN — HEPARIN SODIUM 5000 UNIT(S): 5000 INJECTION INTRAVENOUS; SUBCUTANEOUS at 06:25

## 2018-11-02 NOTE — DISCHARGE NOTE ADULT - PATIENT PORTAL LINK FT
You can access the [facility] Patient Portal, offered by [facility], by registering with the following website: [email or web address]

## 2018-11-02 NOTE — DIETITIAN INITIAL EVALUATION ADULT. - PERTINENT MEDS FT
heparin  Injectable  influenza   Vaccine  LORazepam   Injectable PRN  morphine  - Injectable PRN  ondansetron Injectable PRN  pantoprazole  Injectable  sodium chloride 0.9%.

## 2018-11-02 NOTE — DISCHARGE NOTE ADULT - MEDICATION SUMMARY - MEDICATIONS TO TAKE
I will START or STAY ON the medications listed below when I get home from the hospital:    Pepcid 20 mg oral tablet  -- 1 tab(s) by mouth 2 times a day   -- It is very important that you take or use this exactly as directed.  Do not skip doses or discontinue unless directed by your doctor.  Obtain medical advice before taking any non-prescription drugs as some may affect the action of this medication.    -- Indication: For gi    Multiple Vitamins oral tablet  -- 1 tab(s) by mouth once a day  -- Indication: For general    folic acid 1 mg oral tablet  -- 1 tab(s) by mouth once a day  -- Indication: For general    thiamine 100 mg oral tablet  -- 1 tab(s) by mouth once a day  -- Indication: For general

## 2018-11-02 NOTE — DISCHARGE NOTE ADULT - CARE PROVIDER_API CALL
Portillo Mcclellan), Internal Medicine  [address] [facility] [facility] [address] [place] [address]  Phone: [phone]  Fax: [phone]

## 2018-11-02 NOTE — DIETITIAN INITIAL EVALUATION ADULT. - PERTINENT LABORATORY DATA
11-02 Na 139 mmol/L Glu 86 mg/dL K+ 3.9 mmol/L Cr 0.79 mg/dL BUN 6 mg/dL<L> Phos 2.4 mg/dL<L> Alb n/a   PAB n/a   Hgb 14.4 g/dL Hct 43.9 % HgA1C n/a    Glucose, Serum: 86 mg/dL, Alb=2.9(11/1), Phos=2.4(11/2), Alcohol=15(11/1)

## 2018-11-02 NOTE — DISCHARGE NOTE ADULT - CARE PLAN
Principal Discharge DX:	ETOH abuse  Goal:	STOP DRINKING  Assessment and plan of treatment:	STOP DRINKING

## 2018-11-02 NOTE — DISCHARGE NOTE ADULT - HOSPITAL COURSE
53M PMH ETOH abuse, hx of ETOH withdrawal, GERD, HLD, presents with alcohol withdrawal, alcoholic gastritis/esophagitis c/o diffuse abd pain. Pt admits he has only drank alcohol and has not eaten in days. Pt admitted to the ICU for agitation and ETOH intoxication requiring Precedex, with concern for lactic acidosis that is not clearing, however lactate likely type B in setting of ETOH ketoacidosis. No signs of hypoperfusion.  Pt is frequently admitted to unit for ETOH intoxication.  Precedex now off. Patient  seen and examined at the bedside this am   Currently awake and alert  continue to  monitor CIWA, which is at zero at discharge. pt declined supportive etoh counseling see SW note .

## 2018-11-02 NOTE — DIETITIAN INITIAL EVALUATION ADULT. - ORAL INTAKE PTA
Pt reports eating well PTA; Breakfast; Roti & hassan & Lunch & Dinner; Chicken, rice & Roti; pt states drinking Vodka 20ounces 3 x week/good

## 2018-11-02 NOTE — DIETITIAN INITIAL EVALUATION ADULT. - OTHER INFO
Pt seen for RN consult 11/1 for decreased po intake >5 days PTA.  Pt lives with wife & children. Pt's wife does cooking & food shopping PTA.  Pt states poor po intake x 1 day PTA & noted po intake 25% 11/1. Pt reports improved appetite & eating well this am (11/2)100% omelette & coffee. No GI distress. pt reports last BM x 1(11/1).

## 2018-11-07 DIAGNOSIS — K21.9 GASTRO-ESOPHAGEAL REFLUX DISEASE WITHOUT ESOPHAGITIS: ICD-10-CM

## 2018-11-07 DIAGNOSIS — E78.5 HYPERLIPIDEMIA, UNSPECIFIED: ICD-10-CM

## 2018-11-07 DIAGNOSIS — E87.2 ACIDOSIS: ICD-10-CM

## 2018-11-07 DIAGNOSIS — F10.229 ALCOHOL DEPENDENCE WITH INTOXICATION, UNSPECIFIED: ICD-10-CM

## 2018-11-07 DIAGNOSIS — F10.239 ALCOHOL DEPENDENCE WITH WITHDRAWAL, UNSPECIFIED: ICD-10-CM

## 2018-11-07 DIAGNOSIS — R45.1 RESTLESSNESS AND AGITATION: ICD-10-CM

## 2018-11-07 DIAGNOSIS — K20.9 ESOPHAGITIS, UNSPECIFIED: ICD-10-CM

## 2018-11-07 DIAGNOSIS — K29.70 GASTRITIS, UNSPECIFIED, WITHOUT BLEEDING: ICD-10-CM

## 2018-11-14 ENCOUNTER — INPATIENT (INPATIENT)
Facility: HOSPITAL | Age: 53
LOS: 2 days | Discharge: HOME | End: 2018-11-17
Attending: HOSPITALIST | Admitting: HOSPITALIST
Payer: MEDICAID

## 2018-11-14 VITALS
SYSTOLIC BLOOD PRESSURE: 133 MMHG | OXYGEN SATURATION: 96 % | HEIGHT: 67 IN | RESPIRATION RATE: 18 BRPM | TEMPERATURE: 98 F | WEIGHT: 165.35 LBS | HEART RATE: 121 BPM | DIASTOLIC BLOOD PRESSURE: 98 MMHG

## 2018-11-14 PROCEDURE — 99285 EMERGENCY DEPT VISIT HI MDM: CPT | Mod: 25

## 2018-11-14 NOTE — ED ADULT TRIAGE NOTE - CHIEF COMPLAINT QUOTE
history of pUD, alcohol abuse and withdrawal, c/o abd pain, vomiting started yesterday, last drink a day ago.

## 2018-11-15 LAB
ALBUMIN SERPL ELPH-MCNC: 3.3 G/DL — SIGNIFICANT CHANGE UP (ref 3.3–5)
ALBUMIN SERPL ELPH-MCNC: 4.9 G/DL — SIGNIFICANT CHANGE UP (ref 3.3–5)
ALP SERPL-CCNC: 39 U/L — LOW (ref 40–120)
ALP SERPL-CCNC: 55 U/L — SIGNIFICANT CHANGE UP (ref 40–120)
ALT FLD-CCNC: 53 U/L — SIGNIFICANT CHANGE UP (ref 12–78)
ALT FLD-CCNC: 91 U/L — HIGH (ref 12–78)
AMPHET UR-MCNC: NEGATIVE — SIGNIFICANT CHANGE UP
ANION GAP SERPL CALC-SCNC: 10 MMOL/L — SIGNIFICANT CHANGE UP (ref 5–17)
ANION GAP SERPL CALC-SCNC: 23 MMOL/L — HIGH (ref 5–17)
APPEARANCE UR: CLEAR — SIGNIFICANT CHANGE UP
AST SERPL-CCNC: 127 U/L — HIGH (ref 15–37)
AST SERPL-CCNC: 62 U/L — HIGH (ref 15–37)
BACTERIA # UR AUTO: ABNORMAL
BARBITURATES UR SCN-MCNC: POSITIVE — SIGNIFICANT CHANGE UP
BASOPHILS # BLD AUTO: 0.03 K/UL — SIGNIFICANT CHANGE UP (ref 0–0.2)
BASOPHILS NFR BLD AUTO: 0.4 % — SIGNIFICANT CHANGE UP (ref 0–2)
BENZODIAZ UR-MCNC: NEGATIVE — SIGNIFICANT CHANGE UP
BILIRUB DIRECT SERPL-MCNC: 0.35 MG/DL — HIGH (ref 0.05–0.2)
BILIRUB INDIRECT FLD-MCNC: 0.8 MG/DL — SIGNIFICANT CHANGE UP (ref 0.2–1)
BILIRUB SERPL-MCNC: 1.2 MG/DL — SIGNIFICANT CHANGE UP (ref 0.2–1.2)
BILIRUB SERPL-MCNC: 1.4 MG/DL — HIGH (ref 0.2–1.2)
BILIRUB UR-MCNC: NEGATIVE — SIGNIFICANT CHANGE UP
BUN SERPL-MCNC: 10 MG/DL — SIGNIFICANT CHANGE UP (ref 7–23)
BUN SERPL-MCNC: 15 MG/DL — SIGNIFICANT CHANGE UP (ref 7–23)
CALCIUM SERPL-MCNC: 10 MG/DL — SIGNIFICANT CHANGE UP (ref 8.5–10.1)
CALCIUM SERPL-MCNC: 7.5 MG/DL — LOW (ref 8.5–10.1)
CHLORIDE SERPL-SCNC: 103 MMOL/L — SIGNIFICANT CHANGE UP (ref 96–108)
CHLORIDE SERPL-SCNC: 90 MMOL/L — LOW (ref 96–108)
CO2 SERPL-SCNC: 26 MMOL/L — SIGNIFICANT CHANGE UP (ref 22–31)
CO2 SERPL-SCNC: 28 MMOL/L — SIGNIFICANT CHANGE UP (ref 22–31)
COCAINE METAB.OTHER UR-MCNC: NEGATIVE — SIGNIFICANT CHANGE UP
COLOR SPEC: YELLOW — SIGNIFICANT CHANGE UP
CREAT SERPL-MCNC: 0.75 MG/DL — SIGNIFICANT CHANGE UP (ref 0.5–1.3)
CREAT SERPL-MCNC: 1.2 MG/DL — SIGNIFICANT CHANGE UP (ref 0.5–1.3)
DIFF PNL FLD: ABNORMAL
EOSINOPHIL # BLD AUTO: 0.01 K/UL — SIGNIFICANT CHANGE UP (ref 0–0.5)
EOSINOPHIL NFR BLD AUTO: 0.1 % — SIGNIFICANT CHANGE UP (ref 0–6)
EPI CELLS # UR: SIGNIFICANT CHANGE UP
ETHANOL SERPL-MCNC: 162 MG/DL — HIGH (ref 0–10)
GLUCOSE SERPL-MCNC: 109 MG/DL — HIGH (ref 70–99)
GLUCOSE SERPL-MCNC: 93 MG/DL — SIGNIFICANT CHANGE UP (ref 70–99)
GLUCOSE UR QL: NEGATIVE MG/DL — SIGNIFICANT CHANGE UP
HCT VFR BLD CALC: 49.7 % — SIGNIFICANT CHANGE UP (ref 39–50)
HGB BLD-MCNC: 16.9 G/DL — SIGNIFICANT CHANGE UP (ref 13–17)
IMM GRANULOCYTES NFR BLD AUTO: 0.1 % — SIGNIFICANT CHANGE UP (ref 0–1.5)
KETONES UR-MCNC: ABNORMAL
LACTATE SERPL-SCNC: 2.9 MMOL/L — HIGH (ref 0.7–2)
LACTATE SERPL-SCNC: 7.9 MMOL/L — CRITICAL HIGH (ref 0.7–2)
LEUKOCYTE ESTERASE UR-ACNC: NEGATIVE — SIGNIFICANT CHANGE UP
LIDOCAIN IGE QN: 251 U/L — SIGNIFICANT CHANGE UP (ref 73–393)
LYMPHOCYTES # BLD AUTO: 0.49 K/UL — LOW (ref 1–3.3)
LYMPHOCYTES # BLD AUTO: 7.1 % — LOW (ref 13–44)
MAGNESIUM SERPL-MCNC: 1.8 MG/DL — SIGNIFICANT CHANGE UP (ref 1.6–2.6)
MCHC RBC-ENTMCNC: 29.1 PG — SIGNIFICANT CHANGE UP (ref 27–34)
MCHC RBC-ENTMCNC: 34 GM/DL — SIGNIFICANT CHANGE UP (ref 32–36)
MCV RBC AUTO: 85.5 FL — SIGNIFICANT CHANGE UP (ref 80–100)
METHADONE UR-MCNC: NEGATIVE — SIGNIFICANT CHANGE UP
MONOCYTES # BLD AUTO: 0.38 K/UL — SIGNIFICANT CHANGE UP (ref 0–0.9)
MONOCYTES NFR BLD AUTO: 5.5 % — SIGNIFICANT CHANGE UP (ref 2–14)
NEUTROPHILS # BLD AUTO: 6.02 K/UL — SIGNIFICANT CHANGE UP (ref 1.8–7.4)
NEUTROPHILS NFR BLD AUTO: 86.8 % — HIGH (ref 43–77)
NITRITE UR-MCNC: NEGATIVE — SIGNIFICANT CHANGE UP
NRBC # BLD: 0 /100 WBCS — SIGNIFICANT CHANGE UP (ref 0–0)
OPIATES UR-MCNC: POSITIVE — SIGNIFICANT CHANGE UP
PCP SPEC-MCNC: SIGNIFICANT CHANGE UP
PCP UR-MCNC: NEGATIVE — SIGNIFICANT CHANGE UP
PH UR: 6.5 — SIGNIFICANT CHANGE UP (ref 5–8)
PHOSPHATE SERPL-MCNC: 1.4 MG/DL — LOW (ref 2.5–4.5)
PLATELET # BLD AUTO: 181 K/UL — SIGNIFICANT CHANGE UP (ref 150–400)
POTASSIUM SERPL-MCNC: 3.6 MMOL/L — SIGNIFICANT CHANGE UP (ref 3.5–5.3)
POTASSIUM SERPL-MCNC: 4.3 MMOL/L — SIGNIFICANT CHANGE UP (ref 3.5–5.3)
POTASSIUM SERPL-SCNC: 3.6 MMOL/L — SIGNIFICANT CHANGE UP (ref 3.5–5.3)
POTASSIUM SERPL-SCNC: 4.3 MMOL/L — SIGNIFICANT CHANGE UP (ref 3.5–5.3)
PROT SERPL-MCNC: 10.4 GM/DL — HIGH (ref 6–8.3)
PROT SERPL-MCNC: 6.8 GM/DL — SIGNIFICANT CHANGE UP (ref 6–8.3)
PROT UR-MCNC: 100 MG/DL
RBC # BLD: 5.81 M/UL — HIGH (ref 4.2–5.8)
RBC # FLD: 12.9 % — SIGNIFICANT CHANGE UP (ref 10.3–14.5)
RBC CASTS # UR COMP ASSIST: ABNORMAL /HPF (ref 0–4)
SODIUM SERPL-SCNC: 139 MMOL/L — SIGNIFICANT CHANGE UP (ref 135–145)
SODIUM SERPL-SCNC: 141 MMOL/L — SIGNIFICANT CHANGE UP (ref 135–145)
SP GR SPEC: 1.01 — SIGNIFICANT CHANGE UP (ref 1.01–1.02)
THC UR QL: NEGATIVE — SIGNIFICANT CHANGE UP
UROBILINOGEN FLD QL: 4 MG/DL
WBC # BLD: 6.94 K/UL — SIGNIFICANT CHANGE UP (ref 3.8–10.5)
WBC # FLD AUTO: 6.94 K/UL — SIGNIFICANT CHANGE UP (ref 3.8–10.5)
WBC UR QL: SIGNIFICANT CHANGE UP

## 2018-11-15 PROCEDURE — 99222 1ST HOSP IP/OBS MODERATE 55: CPT

## 2018-11-15 PROCEDURE — 74177 CT ABD & PELVIS W/CONTRAST: CPT | Mod: 26

## 2018-11-15 RX ORDER — PANTOPRAZOLE SODIUM 20 MG/1
40 TABLET, DELAYED RELEASE ORAL EVERY 12 HOURS
Qty: 0 | Refills: 0 | Status: DISCONTINUED | OUTPATIENT
Start: 2018-11-15 | End: 2018-11-17

## 2018-11-15 RX ORDER — IOHEXOL 300 MG/ML
30 INJECTION, SOLUTION INTRAVENOUS ONCE
Qty: 0 | Refills: 0 | Status: COMPLETED | OUTPATIENT
Start: 2018-11-15 | End: 2018-11-15

## 2018-11-15 RX ORDER — ACETAMINOPHEN 500 MG
650 TABLET ORAL ONCE
Qty: 0 | Refills: 0 | Status: COMPLETED | OUTPATIENT
Start: 2018-11-15 | End: 2018-11-16

## 2018-11-15 RX ORDER — SODIUM CHLORIDE 9 MG/ML
3 INJECTION INTRAMUSCULAR; INTRAVENOUS; SUBCUTANEOUS ONCE
Qty: 0 | Refills: 0 | Status: COMPLETED | OUTPATIENT
Start: 2018-11-15 | End: 2018-11-15

## 2018-11-15 RX ORDER — PANTOPRAZOLE SODIUM 20 MG/1
40 TABLET, DELAYED RELEASE ORAL ONCE
Qty: 0 | Refills: 0 | Status: COMPLETED | OUTPATIENT
Start: 2018-11-15 | End: 2018-11-15

## 2018-11-15 RX ORDER — POLYETHYLENE GLYCOL 3350 17 G/17G
17 POWDER, FOR SOLUTION ORAL DAILY
Qty: 0 | Refills: 0 | Status: DISCONTINUED | OUTPATIENT
Start: 2018-11-15 | End: 2018-11-17

## 2018-11-15 RX ORDER — SODIUM CHLORIDE 9 MG/ML
2000 INJECTION INTRAMUSCULAR; INTRAVENOUS; SUBCUTANEOUS ONCE
Qty: 0 | Refills: 0 | Status: COMPLETED | OUTPATIENT
Start: 2018-11-15 | End: 2018-11-15

## 2018-11-15 RX ORDER — FAMOTIDINE 10 MG/ML
20 INJECTION INTRAVENOUS ONCE
Qty: 0 | Refills: 0 | Status: COMPLETED | OUTPATIENT
Start: 2018-11-15 | End: 2018-11-15

## 2018-11-15 RX ORDER — SODIUM CHLORIDE 9 MG/ML
1000 INJECTION INTRAMUSCULAR; INTRAVENOUS; SUBCUTANEOUS ONCE
Qty: 0 | Refills: 0 | Status: COMPLETED | OUTPATIENT
Start: 2018-11-15 | End: 2018-11-15

## 2018-11-15 RX ORDER — INFLUENZA VIRUS VACCINE 15; 15; 15; 15 UG/.5ML; UG/.5ML; UG/.5ML; UG/.5ML
0.5 SUSPENSION INTRAMUSCULAR ONCE
Qty: 0 | Refills: 0 | Status: DISCONTINUED | OUTPATIENT
Start: 2018-11-15 | End: 2018-11-17

## 2018-11-15 RX ORDER — SODIUM CHLORIDE 9 MG/ML
1000 INJECTION, SOLUTION INTRAVENOUS
Qty: 0 | Refills: 0 | Status: COMPLETED | OUTPATIENT
Start: 2018-11-15 | End: 2018-11-15

## 2018-11-15 RX ORDER — ONDANSETRON 8 MG/1
4 TABLET, FILM COATED ORAL ONCE
Qty: 0 | Refills: 0 | Status: COMPLETED | OUTPATIENT
Start: 2018-11-15 | End: 2018-11-15

## 2018-11-15 RX ORDER — MORPHINE SULFATE 50 MG/1
2 CAPSULE, EXTENDED RELEASE ORAL ONCE
Qty: 0 | Refills: 0 | Status: DISCONTINUED | OUTPATIENT
Start: 2018-11-15 | End: 2018-11-15

## 2018-11-15 RX ADMIN — MORPHINE SULFATE 2 MILLIGRAM(S): 50 CAPSULE, EXTENDED RELEASE ORAL at 04:17

## 2018-11-15 RX ADMIN — FAMOTIDINE 20 MILLIGRAM(S): 10 INJECTION INTRAVENOUS at 23:18

## 2018-11-15 RX ADMIN — SODIUM CHLORIDE 1000 MILLILITER(S): 9 INJECTION INTRAMUSCULAR; INTRAVENOUS; SUBCUTANEOUS at 10:12

## 2018-11-15 RX ADMIN — SODIUM CHLORIDE 3 MILLILITER(S): 9 INJECTION INTRAMUSCULAR; INTRAVENOUS; SUBCUTANEOUS at 02:19

## 2018-11-15 RX ADMIN — ONDANSETRON 4 MILLIGRAM(S): 8 TABLET, FILM COATED ORAL at 02:16

## 2018-11-15 RX ADMIN — MORPHINE SULFATE 2 MILLIGRAM(S): 50 CAPSULE, EXTENDED RELEASE ORAL at 07:15

## 2018-11-15 RX ADMIN — Medication 2 MILLIGRAM(S): at 06:47

## 2018-11-15 RX ADMIN — IOHEXOL 30 MILLILITER(S): 300 INJECTION, SOLUTION INTRAVENOUS at 04:19

## 2018-11-15 RX ADMIN — SODIUM CHLORIDE 2000 MILLILITER(S): 9 INJECTION INTRAMUSCULAR; INTRAVENOUS; SUBCUTANEOUS at 04:18

## 2018-11-15 RX ADMIN — SODIUM CHLORIDE 1000 MILLILITER(S): 9 INJECTION INTRAMUSCULAR; INTRAVENOUS; SUBCUTANEOUS at 11:21

## 2018-11-15 RX ADMIN — SODIUM CHLORIDE 250 MILLILITER(S): 9 INJECTION, SOLUTION INTRAVENOUS at 02:37

## 2018-11-15 RX ADMIN — PANTOPRAZOLE SODIUM 40 MILLIGRAM(S): 20 TABLET, DELAYED RELEASE ORAL at 02:15

## 2018-11-15 RX ADMIN — Medication 50 MILLIGRAM(S): at 18:06

## 2018-11-15 RX ADMIN — Medication 50 MILLIGRAM(S): at 12:04

## 2018-11-15 RX ADMIN — PANTOPRAZOLE SODIUM 40 MILLIGRAM(S): 20 TABLET, DELAYED RELEASE ORAL at 18:08

## 2018-11-15 RX ADMIN — Medication 2 MILLIGRAM(S): at 15:55

## 2018-11-15 NOTE — ED ADULT NURSE NOTE - OBJECTIVE STATEMENT
history of  alcohol abuse and withdrawal. last drink 1 bottle vodka yesterday. pt c/o of headache, esophogeal and chest pain after vomiting. Pt  c/o abd pain 8/10 diffuse, vomiting started yesterday.

## 2018-11-15 NOTE — ED PROVIDER NOTE - CARE PLAN
Principal Discharge DX:	Alcohol withdrawal syndrome without complication Principal Discharge DX:	Alcohol withdrawal syndrome without complication  Secondary Diagnosis:	Dehydration

## 2018-11-15 NOTE — H&P ADULT - NSHPLABSRESULTS_GEN_ALL_CORE
16.9   6.94  )-----------( 181      ( 15 Nov 2018 02:27 )             49.7   11-15    139  |  90<L>  |  15  ----------------------------<  109<H>  4.3   |  26  |  1.20    Ca    10.0      15 Nov 2018 02:27    TPro  10.4<H>  /  Alb  4.9  /  TBili  1.4<H>  /  DBili  x   /  AST  127<H>  /  ALT  91<H>  /  AlkPhos  55  11-15  < from: CT Abdomen and Pelvis w/ Oral Cont and w/ IV Cont (11.15.18 @ 06:19) >    The visualized osseous structures demonstrate no acute abnormality.    IMPRESSION:   Distal esophageal wall thickening which can be seen with esophagitis.   Please correlate clinically. No bowel obstruction.    Hepatic steatosis.

## 2018-11-15 NOTE — ED PROVIDER NOTE - NS ED MD DISPO ISOLATION TYPES
[facility]    [address] [address]    Phone:  [phone]       Thank You for choosing us for your health care visit. We are glad to serve you and happy to provide you with this summary of your visit. Please help us to ensure we have accurate records. If you find anything that needs to be changed, please let our staff know as soon as possible.          Your Demographic Information     Patient Name Sex April Callejas Female 1993       Ethnic Group Patient Race    Not of  or  Origin White      Your Visit Details     Date & Time Provider Department    2017 10:30 AM Payal Garber NP [facility]      Your Upcoming Appointment*(Max 10)     2017  5:15 PM CDT   Complete Physical Exam with Enedina Gonzalez DO   [facility] ([facility])    [address] [place] [id number]   [phone]              We Ordered or Performed the Following     SERVICE TO ORTHOPEDICS       Conditions Discussed Today or Order-Related Diagnoses        Comments    Radial styloid tenosynovitis    -  Primary       Your Vitals Were     BP Pulse Temp Resp Height Weight    116/76 (BP Location: RUE, Patient Position: Sitting, Cuff Size: Regular) 64 98.5 °F (36.9 °C) (Tympanic) 16 5' 1\" (1.549 m) 176 lb (79.8 kg)    BMI Smoking Status                33.25 kg/m2 Never Smoker          Medications Prescribed or Re-Ordered Today     None      Your Current Medications Are        Disp Refills Start End    aspirin-acetaminophen-caffeine (EXCEDRIN MIGRAINE) 250-250-65 MG per tablet        Sig - Route: Take 2 tablets by mouth as needed for Pain. - Oral    Class: Historical Med    DRYSOL 20 % topical solution 35 mL 2 5/10/2017     Sig: Apply topically 3 times a week.    Class: Eprescribe    Norgestimate-Ethinyl Estradiol (TRI-SPRINTEC) 0.18/0.215/0.25 MG-35 MCG  tablet 84 tablet 3 9/22/2016     Sig - Route: Take 1 tablet by mouth daily. - Oral    Class: Eprescribe    IBUPROFEN PO        Sig - Route: Take  by mouth. - Oral    Class: Historical Med      Allergies     No Known Allergies      Immunizations History as of 6/5/2017     Name Date    DTaP 4/16/1996, 2/23/1996, 1995    Dtap/hib 1/22/1997, 5/13/1996, 2/27/1996    HPV QUAD 8/5/2010, 10/6/2008, 7/8/2008    Hepatitis B Child 4/16/1996, 1993, 1993    Influenza 9/20/2013  9:15 AM, 10/15/2011    MMR 5/24/2013, 1/22/1997    Meningococcal Conjugate MCV4P (Menactra) 8/5/2010    PPD 6/3/2016, 2/17/2014  3:00 PM    Polio, ORAL 5/13/1996, 4/16/1996, 2/27/1996, 1995    Tdap 7/8/2008      Problem List as of 6/5/2017     Allergic rhinitis due to pollen    Hearing problem of right ear    Atypical nevus    Weight gain, abnormal    Hearing deficit            Patient Instructions     None       None

## 2018-11-15 NOTE — ED PROVIDER NOTE - PROGRESS NOTE DETAILS
Pt was seen by Dr. Baeza and signed out to me for dispo. Pt sts he drinks one pine of vodka per day but last drink was two days ago due to pain to the throat. HR 74 bp 142/62 rr 18 pox 96% in room air. Pt will be admitted for alcohol withdrawal and dehydration. Dr. Smiley is notified and admit pt.

## 2018-11-15 NOTE — ED PROVIDER NOTE - OBJECTIVE STATEMENT
Pertinent PMH/PSH/FHx/SHx and Review of Systems contained within:  52 yo m with pmh of alcohol abuse presents in ED c/o abdominal pain and nbnb vomitus s/p drinking today. No aggravating or relieving factors, No fever/chills, No photophobia/eye pain/changes in vision, No ear pain/sore throat/dysphagia, No chest pain/palpitations, no SOB/cough/wheeze/stridor, No D, no dysuria/frequency/discharge, No neck/back pain, no rash, no changes in neurological status/function.

## 2018-11-15 NOTE — H&P ADULT - HISTORY OF PRESENT ILLNESS
52 yo m with pmh of alcohol abuse presents in ED c/o abdominal pain and nbnb vomitus s/p drinking today. No aggravating or relieving factors, No fever/chills, No photophobia/eye pain/changes in vision, No ear pain/sore throat/dysphagia, No chest pain/palpitations, no SOB/cough/wheeze/stridor, No D, no dysuria/frequency/discharge, No neck/back pain, no rash, no changes in neurological status/function.

## 2018-11-15 NOTE — ED PROVIDER NOTE - MEDICAL DECISION MAKING DETAILS
pending lactate, ct and admission. Patient care transitioned to incoming team.  All decisions regarding the progression of care will be made at their discretion.

## 2018-11-16 DIAGNOSIS — E86.0 DEHYDRATION: ICD-10-CM

## 2018-11-16 DIAGNOSIS — F10.230 ALCOHOL DEPENDENCE WITH WITHDRAWAL, UNCOMPLICATED: ICD-10-CM

## 2018-11-16 DIAGNOSIS — E78.2 MIXED HYPERLIPIDEMIA: ICD-10-CM

## 2018-11-16 DIAGNOSIS — E83.39 OTHER DISORDERS OF PHOSPHORUS METABOLISM: ICD-10-CM

## 2018-11-16 DIAGNOSIS — K27.9 PEPTIC ULCER, SITE UNSPECIFIED, UNSPECIFIED AS ACUTE OR CHRONIC, WITHOUT HEMORRHAGE OR PERFORATION: ICD-10-CM

## 2018-11-16 LAB
ANION GAP SERPL CALC-SCNC: 10 MMOL/L — SIGNIFICANT CHANGE UP (ref 5–17)
BUN SERPL-MCNC: 9 MG/DL — SIGNIFICANT CHANGE UP (ref 7–23)
CALCIUM SERPL-MCNC: 8.2 MG/DL — LOW (ref 8.5–10.1)
CHLORIDE SERPL-SCNC: 101 MMOL/L — SIGNIFICANT CHANGE UP (ref 96–108)
CO2 SERPL-SCNC: 27 MMOL/L — SIGNIFICANT CHANGE UP (ref 22–31)
CREAT SERPL-MCNC: 0.83 MG/DL — SIGNIFICANT CHANGE UP (ref 0.5–1.3)
GLUCOSE SERPL-MCNC: 91 MG/DL — SIGNIFICANT CHANGE UP (ref 70–99)
HCT VFR BLD CALC: 40.9 % — SIGNIFICANT CHANGE UP (ref 39–50)
HGB BLD-MCNC: 13.4 G/DL — SIGNIFICANT CHANGE UP (ref 13–17)
MAGNESIUM SERPL-MCNC: 2 MG/DL — SIGNIFICANT CHANGE UP (ref 1.6–2.6)
MCHC RBC-ENTMCNC: 28.8 PG — SIGNIFICANT CHANGE UP (ref 27–34)
MCHC RBC-ENTMCNC: 32.8 GM/DL — SIGNIFICANT CHANGE UP (ref 32–36)
MCV RBC AUTO: 87.8 FL — SIGNIFICANT CHANGE UP (ref 80–100)
NRBC # BLD: 0 /100 WBCS — SIGNIFICANT CHANGE UP (ref 0–0)
PHOSPHATE SERPL-MCNC: 2.1 MG/DL — LOW (ref 2.5–4.5)
PLATELET # BLD AUTO: 131 K/UL — LOW (ref 150–400)
POTASSIUM SERPL-MCNC: 3.3 MMOL/L — LOW (ref 3.5–5.3)
POTASSIUM SERPL-SCNC: 3.3 MMOL/L — LOW (ref 3.5–5.3)
RBC # BLD: 4.66 M/UL — SIGNIFICANT CHANGE UP (ref 4.2–5.8)
RBC # FLD: 12.5 % — SIGNIFICANT CHANGE UP (ref 10.3–14.5)
SODIUM SERPL-SCNC: 138 MMOL/L — SIGNIFICANT CHANGE UP (ref 135–145)
WBC # BLD: 4.58 K/UL — SIGNIFICANT CHANGE UP (ref 3.8–10.5)
WBC # FLD AUTO: 4.58 K/UL — SIGNIFICANT CHANGE UP (ref 3.8–10.5)

## 2018-11-16 PROCEDURE — 99232 SBSQ HOSP IP/OBS MODERATE 35: CPT

## 2018-11-16 RX ORDER — POTASSIUM CHLORIDE 20 MEQ
40 PACKET (EA) ORAL ONCE
Qty: 0 | Refills: 0 | Status: COMPLETED | OUTPATIENT
Start: 2018-11-16 | End: 2018-11-16

## 2018-11-16 RX ORDER — ACETAMINOPHEN 500 MG
1000 TABLET ORAL ONCE
Qty: 0 | Refills: 0 | Status: COMPLETED | OUTPATIENT
Start: 2018-11-16 | End: 2018-11-16

## 2018-11-16 RX ORDER — SODIUM,POTASSIUM PHOSPHATES 278-250MG
1 POWDER IN PACKET (EA) ORAL
Qty: 0 | Refills: 0 | Status: COMPLETED | OUTPATIENT
Start: 2018-11-16 | End: 2018-11-17

## 2018-11-16 RX ADMIN — Medication 30 MILLILITER(S): at 11:35

## 2018-11-16 RX ADMIN — PANTOPRAZOLE SODIUM 40 MILLIGRAM(S): 20 TABLET, DELAYED RELEASE ORAL at 06:24

## 2018-11-16 RX ADMIN — Medication 650 MILLIGRAM(S): at 11:31

## 2018-11-16 RX ADMIN — Medication 50 MILLIGRAM(S): at 22:03

## 2018-11-16 RX ADMIN — Medication 2 MILLIGRAM(S): at 18:50

## 2018-11-16 RX ADMIN — Medication 40 MILLIEQUIVALENT(S): at 11:26

## 2018-11-16 RX ADMIN — Medication 50 MILLIGRAM(S): at 06:24

## 2018-11-16 RX ADMIN — PANTOPRAZOLE SODIUM 40 MILLIGRAM(S): 20 TABLET, DELAYED RELEASE ORAL at 18:52

## 2018-11-16 RX ADMIN — Medication 1000 MILLIGRAM(S): at 22:12

## 2018-11-16 RX ADMIN — Medication 50 MILLIGRAM(S): at 00:09

## 2018-11-16 RX ADMIN — Medication 1 TABLET(S): at 15:01

## 2018-11-16 RX ADMIN — Medication 50 MILLIGRAM(S): at 15:01

## 2018-11-16 RX ADMIN — Medication 400 MILLIGRAM(S): at 21:57

## 2018-11-16 RX ADMIN — Medication 1 TABLET(S): at 22:05

## 2018-11-16 RX ADMIN — Medication 1 TABLET(S): at 18:50

## 2018-11-16 RX ADMIN — Medication 1 TABLET(S): at 11:58

## 2018-11-16 RX ADMIN — Medication 30 MILLILITER(S): at 20:05

## 2018-11-16 NOTE — PROGRESS NOTE ADULT - SUBJECTIVE AND OBJECTIVE BOX
Patient is a 53y old  Male who presents with a chief complaint of     INTERVAL HPI/OVERNIGHT EVENTS: no acute events overnight CIWA 3     MEDICATIONS  (STANDING):  chlordiazePOXIDE   Oral   chlordiazePOXIDE 50 milliGRAM(s) Oral every 8 hours  influenza   Vaccine 0.5 milliLiter(s) IntraMuscular once  pantoprazole  Injectable 40 milliGRAM(s) IV Push every 12 hours  potassium acid phosphate/sodium acid phosphate tablet (K-PHOS No. 2) 1 Tablet(s) Oral four times a day with meals    MEDICATIONS  (PRN):  aluminum hydroxide/magnesium hydroxide/simethicone Suspension 30 milliLiter(s) Oral every 6 hours PRN Dyspepsia  LORazepam   Injectable 2 milliGRAM(s) IntraMuscular every 4 hours PRN Agitation  polyethylene glycol 3350 17 Gram(s) Oral daily PRN Constipation      Allergies    No Known Allergies    Intolerances        REVIEW OF SYSTEMS:  CONSTITUTIONAL: No fever, weight loss, or fatigue  EYES: No eye pain, visual disturbances, or discharge  ENMT:  No difficulty hearing, tinnitus, vertigo; No sinus or throat pain  NECK: No pain or stiffness  BREASTS: No pain, masses, or nipple discharge  RESPIRATORY: No cough, wheezing, chills or hemoptysis; No shortness of breath  CARDIOVASCULAR: No chest pain, palpitations, dizziness, or leg swelling  GASTROINTESTINAL: No abdominal or epigastric pain. No nausea, vomiting, or hematemesis; No diarrhea or constipation. No melena or hematochezia.  GENITOURINARY: No dysuria, frequency, hematuria, or incontinence  NEUROLOGICAL: No headaches, memory loss, loss of strength, numbness, or tremors  SKIN: No itching, burning, rashes, or lesions   LYMPH NODES: No enlarged glands  ENDOCRINE: No heat or cold intolerance; No hair loss  MUSCULOSKELETAL: No joint pain or swelling; No muscle, back, or extremity pain  PSYCHIATRIC: No depression, anxiety, mood swings, or difficulty sleeping  HEME/LYMPH: No easy bruising, or bleeding gums  ALLERGY AND IMMUNOLOGIC: No hives or eczema    Vital Signs Last 24 Hrs  T(C): 37 (2018 17:28), Max: 37.1 (2018 11:10)  T(F): 98.6 (2018 17:28), Max: 98.8 (2018 11:10)  HR: 81 (2018 17:28) (72 - 89)  BP: 116/85 (2018 17:28) (109/67 - 131/65)  BP(mean): --  RR: 18 (2018 17:28) (18 - 18)  SpO2: 97% (2018 17:28) (96% - 97%)    PHYSICAL EXAM:  GENERAL: NAD, well-groomed, well-developed  HEAD:  Atraumatic, Normocephalic  EYES: EOMI, PERRLA, conjunctiva and sclera clear  ENMT: No tonsillar erythema, exudates, or enlargement; Moist mucous membranes, Good dentition, No lesions  NECK: Supple, No JVD, Normal thyroid  NERVOUS SYSTEM:  anxious alcoholic tremor   CHEST/LUNG: Clear to percussion bilaterally; No rales, rhonchi, wheezing, or rubs  HEART: Regular rate and rhythm; No murmurs, rubs, or gallops  ABDOMEN: Soft, Nontender, Nondistended; Bowel sounds present  EXTREMITIES:  2+ Peripheral Pulses, No clubbing, cyanosis, or edema  LYMPH: No lymphadenopathy noted  SKIN: No rashes or lesions    LABS:                        13.4   4.58  )-----------( 131      ( 2018 07:45 )             40.9     11-16    138  |  101  |  9   ----------------------------<  91  3.3<L>   |  27  |  0.83    Ca    8.2<L>      2018 07:45  Phos  2.1     11-16  Mg     2.0     11-16    TPro  6.8  /  Alb  3.3  /  TBili  1.2  /  DBili  .35<H>  /  AST  62<H>  /  ALT  53  /  AlkPhos  39<L>  11-15      Urinalysis Basic - ( 15 Nov 2018 06:20 )    Color: Yellow / Appearance: Clear / S.015 / pH: x  Gluc: x / Ketone: Large  / Bili: Negative / Urobili: 4 mg/dL   Blood: x / Protein: 100 mg/dL / Nitrite: Negative   Leuk Esterase: Negative / RBC: 6-10 /HPF / WBC 0-2   Sq Epi: x / Non Sq Epi: Occasional / Bacteria: Few      CAPILLARY BLOOD GLUCOSE          RADIOLOGY & ADDITIONAL TESTS:    Imaging Personally Reviewed:  [X ] YES  [ ] NO    Consultant(s) Notes Reviewed:  [ X] YES  [ ] NO    Care Discussed with Consultants/Other Providers [X ] YES  [ ] NO

## 2018-11-17 ENCOUNTER — TRANSCRIPTION ENCOUNTER (OUTPATIENT)
Age: 53
End: 2018-11-17

## 2018-11-17 VITALS
RESPIRATION RATE: 18 BRPM | OXYGEN SATURATION: 97 % | DIASTOLIC BLOOD PRESSURE: 68 MMHG | TEMPERATURE: 97 F | SYSTOLIC BLOOD PRESSURE: 100 MMHG | HEART RATE: 77 BPM

## 2018-11-17 LAB
ANION GAP SERPL CALC-SCNC: 10 MMOL/L — SIGNIFICANT CHANGE UP (ref 5–17)
BUN SERPL-MCNC: 12 MG/DL — SIGNIFICANT CHANGE UP (ref 7–23)
CALCIUM SERPL-MCNC: 8.5 MG/DL — SIGNIFICANT CHANGE UP (ref 8.5–10.1)
CHLORIDE SERPL-SCNC: 104 MMOL/L — SIGNIFICANT CHANGE UP (ref 96–108)
CO2 SERPL-SCNC: 24 MMOL/L — SIGNIFICANT CHANGE UP (ref 22–31)
CREAT SERPL-MCNC: 0.81 MG/DL — SIGNIFICANT CHANGE UP (ref 0.5–1.3)
GLUCOSE SERPL-MCNC: 89 MG/DL — SIGNIFICANT CHANGE UP (ref 70–99)
HCT VFR BLD CALC: 39.4 % — SIGNIFICANT CHANGE UP (ref 39–50)
HGB BLD-MCNC: 13.2 G/DL — SIGNIFICANT CHANGE UP (ref 13–17)
MAGNESIUM SERPL-MCNC: 2.3 MG/DL — SIGNIFICANT CHANGE UP (ref 1.6–2.6)
MCHC RBC-ENTMCNC: 29.3 PG — SIGNIFICANT CHANGE UP (ref 27–34)
MCHC RBC-ENTMCNC: 33.5 GM/DL — SIGNIFICANT CHANGE UP (ref 32–36)
MCV RBC AUTO: 87.4 FL — SIGNIFICANT CHANGE UP (ref 80–100)
NRBC # BLD: 0 /100 WBCS — SIGNIFICANT CHANGE UP (ref 0–0)
PHOSPHATE SERPL-MCNC: 3.3 MG/DL — SIGNIFICANT CHANGE UP (ref 2.5–4.5)
PLATELET # BLD AUTO: 136 K/UL — LOW (ref 150–400)
POTASSIUM SERPL-MCNC: 3.3 MMOL/L — LOW (ref 3.5–5.3)
POTASSIUM SERPL-SCNC: 3.3 MMOL/L — LOW (ref 3.5–5.3)
RBC # BLD: 4.51 M/UL — SIGNIFICANT CHANGE UP (ref 4.2–5.8)
RBC # FLD: 12.5 % — SIGNIFICANT CHANGE UP (ref 10.3–14.5)
SODIUM SERPL-SCNC: 138 MMOL/L — SIGNIFICANT CHANGE UP (ref 135–145)
WBC # BLD: 3.78 K/UL — LOW (ref 3.8–10.5)
WBC # FLD AUTO: 3.78 K/UL — LOW (ref 3.8–10.5)

## 2018-11-17 PROCEDURE — 99239 HOSP IP/OBS DSCHRG MGMT >30: CPT

## 2018-11-17 RX ORDER — MORPHINE SULFATE 50 MG/1
2 CAPSULE, EXTENDED RELEASE ORAL ONCE
Qty: 0 | Refills: 0 | Status: DISCONTINUED | OUTPATIENT
Start: 2018-11-17 | End: 2018-11-17

## 2018-11-17 RX ADMIN — MORPHINE SULFATE 2 MILLIGRAM(S): 50 CAPSULE, EXTENDED RELEASE ORAL at 06:17

## 2018-11-17 RX ADMIN — PANTOPRAZOLE SODIUM 40 MILLIGRAM(S): 20 TABLET, DELAYED RELEASE ORAL at 05:40

## 2018-11-17 RX ADMIN — Medication 1 TABLET(S): at 07:36

## 2018-11-17 RX ADMIN — Medication 50 MILLIGRAM(S): at 05:40

## 2018-11-17 RX ADMIN — MORPHINE SULFATE 2 MILLIGRAM(S): 50 CAPSULE, EXTENDED RELEASE ORAL at 06:32

## 2018-11-17 NOTE — DISCHARGE NOTE ADULT - CARE PROVIDER_API CALL
Portillo Mclcellan), Internal Medicine  [address] [place] [address]  Phone: [phone]  Fax: [phone]

## 2018-11-17 NOTE — DISCHARGE NOTE ADULT - HOSPITAL COURSE
54 yo m with pmh of alcohol abuse presents in ED c/o abdominal pain and nbnb vomitus s/p drinking today. No aggravating or relieving factors, No fever/chills, No photophobia/eye pain/changes in vision, No ear pain/sore throat/dysphagia, No chest pain/palpitations, no SOB/cough/wheeze/stridor, No D, no dysuria/frequency/discharge, No neck/back pain, no rash, no changes in neurological status/function.    Patient was observed CIWA score 0-1 at discharge case discussed with patient     PATIENT STRONGLY advised to stop drinking

## 2018-11-17 NOTE — DISCHARGE NOTE ADULT - CARE PLAN
Principal Discharge DX:	Alcohol withdrawal syndrome without complication  Goal:	please abstain form alcohol  Assessment and plan of treatment:	please follow up with PMD  Secondary Diagnosis:	Hypophosphatemia  Goal:	resolved  Secondary Diagnosis:	Mixed hyperlipidemia  Goal:	continue your home emdications  Secondary Diagnosis:	PUD (peptic ulcer disease)  Goal:	please follow up with GI  Secondary Diagnosis:	Dehydration  Goal:	resolved

## 2018-11-17 NOTE — DISCHARGE NOTE ADULT - PLAN OF CARE
please abstain form alcohol please follow up with PMD resolved continue your home emdications please follow up with GI

## 2018-11-17 NOTE — DISCHARGE NOTE ADULT - PATIENT PORTAL LINK FT
You can access the [facility] Patient Portal, offered by [facility], by registering with the following website: [email or web address]

## 2018-11-17 NOTE — DISCHARGE NOTE ADULT - MEDICATION SUMMARY - MEDICATIONS TO TAKE
I will START or STAY ON the medications listed below when I get home from the hospital:    Pepcid 20 mg oral tablet  -- 1 tab(s) by mouth 2 times a day   -- It is very important that you take or use this exactly as directed.  Do not skip doses or discontinue unless directed by your doctor.  Obtain medical advice before taking any non-prescription drugs as some may affect the action of this medication.    -- Indication: For PUD (peptic ulcer disease)

## 2018-11-22 DIAGNOSIS — E86.0 DEHYDRATION: ICD-10-CM

## 2018-11-22 DIAGNOSIS — R10.9 UNSPECIFIED ABDOMINAL PAIN: ICD-10-CM

## 2018-11-22 DIAGNOSIS — Z28.21 IMMUNIZATION NOT CARRIED OUT BECAUSE OF PATIENT REFUSAL: ICD-10-CM

## 2018-11-22 DIAGNOSIS — E78.2 MIXED HYPERLIPIDEMIA: ICD-10-CM

## 2018-11-22 DIAGNOSIS — K27.9 PEPTIC ULCER, SITE UNSPECIFIED, UNSPECIFIED AS ACUTE OR CHRONIC, WITHOUT HEMORRHAGE OR PERFORATION: ICD-10-CM

## 2018-11-22 DIAGNOSIS — F10.10 ALCOHOL ABUSE, UNCOMPLICATED: ICD-10-CM

## 2018-11-22 DIAGNOSIS — F10.239 ALCOHOL DEPENDENCE WITH WITHDRAWAL, UNSPECIFIED: ICD-10-CM

## 2018-11-22 DIAGNOSIS — E83.39 OTHER DISORDERS OF PHOSPHORUS METABOLISM: ICD-10-CM

## 2018-11-27 DIAGNOSIS — Z71.89 OTHER SPECIFIED COUNSELING: ICD-10-CM

## 2018-11-29 ENCOUNTER — INPATIENT (INPATIENT)
Facility: HOSPITAL | Age: 53
LOS: 0 days | Discharge: HOME | End: 2018-11-30
Attending: HOSPITALIST | Admitting: HOSPITALIST
Payer: MEDICAID

## 2018-11-29 VITALS
RESPIRATION RATE: 20 BRPM | OXYGEN SATURATION: 95 % | TEMPERATURE: 100 F | SYSTOLIC BLOOD PRESSURE: 148 MMHG | HEIGHT: 67 IN | HEART RATE: 130 BPM | WEIGHT: 171.96 LBS | DIASTOLIC BLOOD PRESSURE: 102 MMHG

## 2018-11-29 DIAGNOSIS — K92.2 GASTROINTESTINAL HEMORRHAGE, UNSPECIFIED: ICD-10-CM

## 2018-11-29 DIAGNOSIS — K27.9 PEPTIC ULCER, SITE UNSPECIFIED, UNSPECIFIED AS ACUTE OR CHRONIC, WITHOUT HEMORRHAGE OR PERFORATION: ICD-10-CM

## 2018-11-29 DIAGNOSIS — R74.8 ABNORMAL LEVELS OF OTHER SERUM ENZYMES: ICD-10-CM

## 2018-11-29 DIAGNOSIS — F10.230 ALCOHOL DEPENDENCE WITH WITHDRAWAL, UNCOMPLICATED: ICD-10-CM

## 2018-11-29 LAB
ALBUMIN SERPL ELPH-MCNC: 3.2 G/DL — LOW (ref 3.3–5)
ALBUMIN SERPL ELPH-MCNC: 4.3 G/DL — SIGNIFICANT CHANGE UP (ref 3.3–5)
ALP SERPL-CCNC: 41 U/L — SIGNIFICANT CHANGE UP (ref 40–120)
ALP SERPL-CCNC: 50 U/L — SIGNIFICANT CHANGE UP (ref 40–120)
ALT FLD-CCNC: 129 U/L — HIGH (ref 12–78)
ALT FLD-CCNC: 89 U/L — HIGH (ref 12–78)
AMYLASE P1 CFR SERPL: 116 U/L — HIGH (ref 25–115)
ANION GAP SERPL CALC-SCNC: 11 MMOL/L — SIGNIFICANT CHANGE UP (ref 5–17)
ANION GAP SERPL CALC-SCNC: 15 MMOL/L — SIGNIFICANT CHANGE UP (ref 5–17)
AST SERPL-CCNC: 54 U/L — HIGH (ref 15–37)
AST SERPL-CCNC: 98 U/L — HIGH (ref 15–37)
BASOPHILS # BLD AUTO: 0.1 K/UL — SIGNIFICANT CHANGE UP (ref 0–0.2)
BASOPHILS NFR BLD AUTO: 0.8 % — SIGNIFICANT CHANGE UP (ref 0–2)
BILIRUB DIRECT SERPL-MCNC: 0.21 MG/DL — HIGH (ref 0.05–0.2)
BILIRUB DIRECT SERPL-MCNC: 0.23 MG/DL — HIGH (ref 0.05–0.2)
BILIRUB INDIRECT FLD-MCNC: 0.5 MG/DL — SIGNIFICANT CHANGE UP (ref 0.2–1)
BILIRUB INDIRECT FLD-MCNC: 0.7 MG/DL — SIGNIFICANT CHANGE UP (ref 0.2–1)
BILIRUB SERPL-MCNC: 0.7 MG/DL — SIGNIFICANT CHANGE UP (ref 0.2–1.2)
BILIRUB SERPL-MCNC: 0.9 MG/DL — SIGNIFICANT CHANGE UP (ref 0.2–1.2)
BUN SERPL-MCNC: 7 MG/DL — SIGNIFICANT CHANGE UP (ref 7–23)
BUN SERPL-MCNC: 9 MG/DL — SIGNIFICANT CHANGE UP (ref 7–23)
CALCIUM SERPL-MCNC: 7.8 MG/DL — LOW (ref 8.5–10.1)
CALCIUM SERPL-MCNC: 9.7 MG/DL — SIGNIFICANT CHANGE UP (ref 8.5–10.1)
CHLORIDE SERPL-SCNC: 103 MMOL/L — SIGNIFICANT CHANGE UP (ref 96–108)
CHLORIDE SERPL-SCNC: 92 MMOL/L — LOW (ref 96–108)
CO2 SERPL-SCNC: 29 MMOL/L — SIGNIFICANT CHANGE UP (ref 22–31)
CO2 SERPL-SCNC: 33 MMOL/L — HIGH (ref 22–31)
CREAT SERPL-MCNC: 1.19 MG/DL — SIGNIFICANT CHANGE UP (ref 0.5–1.3)
CREAT SERPL-MCNC: 1.28 MG/DL — SIGNIFICANT CHANGE UP (ref 0.5–1.3)
EOSINOPHIL # BLD AUTO: 0 K/UL — SIGNIFICANT CHANGE UP (ref 0–0.5)
EOSINOPHIL NFR BLD AUTO: 0 % — SIGNIFICANT CHANGE UP (ref 0–6)
ETHANOL SERPL-MCNC: <10 MG/DL — SIGNIFICANT CHANGE UP (ref 0–10)
GLUCOSE BLDC GLUCOMTR-MCNC: 162 MG/DL — HIGH (ref 70–99)
GLUCOSE SERPL-MCNC: 118 MG/DL — HIGH (ref 70–99)
GLUCOSE SERPL-MCNC: 152 MG/DL — HIGH (ref 70–99)
HCT VFR BLD CALC: 47.5 % — SIGNIFICANT CHANGE UP (ref 39–50)
HGB BLD-MCNC: 15.6 G/DL — SIGNIFICANT CHANGE UP (ref 13–17)
IMM GRANULOCYTES NFR BLD AUTO: 0.2 % — SIGNIFICANT CHANGE UP (ref 0–1.5)
LIDOCAIN IGE QN: 173 U/L — SIGNIFICANT CHANGE UP (ref 73–393)
LYMPHOCYTES # BLD AUTO: 0.72 K/UL — LOW (ref 1–3.3)
LYMPHOCYTES # BLD AUTO: 5.9 % — LOW (ref 13–44)
MAGNESIUM SERPL-MCNC: 2 MG/DL — SIGNIFICANT CHANGE UP (ref 1.6–2.6)
MAGNESIUM SERPL-MCNC: 2 MG/DL — SIGNIFICANT CHANGE UP (ref 1.6–2.6)
MCHC RBC-ENTMCNC: 28.7 PG — SIGNIFICANT CHANGE UP (ref 27–34)
MCHC RBC-ENTMCNC: 32.8 GM/DL — SIGNIFICANT CHANGE UP (ref 32–36)
MCV RBC AUTO: 87.3 FL — SIGNIFICANT CHANGE UP (ref 80–100)
MONOCYTES # BLD AUTO: 0.72 K/UL — SIGNIFICANT CHANGE UP (ref 0–0.9)
MONOCYTES NFR BLD AUTO: 5.9 % — SIGNIFICANT CHANGE UP (ref 2–14)
NEUTROPHILS # BLD AUTO: 10.71 K/UL — HIGH (ref 1.8–7.4)
NEUTROPHILS NFR BLD AUTO: 87.2 % — HIGH (ref 43–77)
NRBC # BLD: 0 /100 WBCS — SIGNIFICANT CHANGE UP (ref 0–0)
PHOSPHATE SERPL-MCNC: 2.6 MG/DL — SIGNIFICANT CHANGE UP (ref 2.5–4.5)
PHOSPHATE SERPL-MCNC: 3.8 MG/DL — SIGNIFICANT CHANGE UP (ref 2.5–4.5)
PLATELET # BLD AUTO: 317 K/UL — SIGNIFICANT CHANGE UP (ref 150–400)
POTASSIUM SERPL-MCNC: 3.5 MMOL/L — SIGNIFICANT CHANGE UP (ref 3.5–5.3)
POTASSIUM SERPL-MCNC: 4.4 MMOL/L — SIGNIFICANT CHANGE UP (ref 3.5–5.3)
POTASSIUM SERPL-SCNC: 3.5 MMOL/L — SIGNIFICANT CHANGE UP (ref 3.5–5.3)
POTASSIUM SERPL-SCNC: 4.4 MMOL/L — SIGNIFICANT CHANGE UP (ref 3.5–5.3)
PROT SERPL-MCNC: 7 GM/DL — SIGNIFICANT CHANGE UP (ref 6–8.3)
PROT SERPL-MCNC: 9.2 GM/DL — HIGH (ref 6–8.3)
RBC # BLD: 5.44 M/UL — SIGNIFICANT CHANGE UP (ref 4.2–5.8)
RBC # FLD: 13.9 % — SIGNIFICANT CHANGE UP (ref 10.3–14.5)
SODIUM SERPL-SCNC: 140 MMOL/L — SIGNIFICANT CHANGE UP (ref 135–145)
SODIUM SERPL-SCNC: 143 MMOL/L — SIGNIFICANT CHANGE UP (ref 135–145)
WBC # BLD: 12.27 K/UL — HIGH (ref 3.8–10.5)
WBC # FLD AUTO: 12.27 K/UL — HIGH (ref 3.8–10.5)

## 2018-11-29 PROCEDURE — 74177 CT ABD & PELVIS W/CONTRAST: CPT | Mod: 26

## 2018-11-29 PROCEDURE — 93010 ELECTROCARDIOGRAM REPORT: CPT

## 2018-11-29 PROCEDURE — 99222 1ST HOSP IP/OBS MODERATE 55: CPT

## 2018-11-29 PROCEDURE — 71045 X-RAY EXAM CHEST 1 VIEW: CPT | Mod: 26

## 2018-11-29 PROCEDURE — 70450 CT HEAD/BRAIN W/O DYE: CPT | Mod: 26

## 2018-11-29 PROCEDURE — 99285 EMERGENCY DEPT VISIT HI MDM: CPT | Mod: 25

## 2018-11-29 RX ORDER — SODIUM CHLORIDE 9 MG/ML
1000 INJECTION, SOLUTION INTRAVENOUS
Qty: 0 | Refills: 0 | Status: COMPLETED | OUTPATIENT
Start: 2018-11-29 | End: 2018-11-29

## 2018-11-29 RX ORDER — SODIUM CHLORIDE 9 MG/ML
2000 INJECTION INTRAMUSCULAR; INTRAVENOUS; SUBCUTANEOUS ONCE
Qty: 0 | Refills: 0 | Status: COMPLETED | OUTPATIENT
Start: 2018-11-29 | End: 2018-11-29

## 2018-11-29 RX ORDER — PANTOPRAZOLE SODIUM 20 MG/1
80 TABLET, DELAYED RELEASE ORAL ONCE
Qty: 0 | Refills: 0 | Status: COMPLETED | OUTPATIENT
Start: 2018-11-29 | End: 2018-11-29

## 2018-11-29 RX ORDER — PANTOPRAZOLE SODIUM 20 MG/1
40 TABLET, DELAYED RELEASE ORAL EVERY 12 HOURS
Qty: 0 | Refills: 0 | Status: DISCONTINUED | OUTPATIENT
Start: 2018-11-29 | End: 2018-11-30

## 2018-11-29 RX ORDER — ONDANSETRON 8 MG/1
8 TABLET, FILM COATED ORAL ONCE
Qty: 0 | Refills: 0 | Status: COMPLETED | OUTPATIENT
Start: 2018-11-29 | End: 2018-11-29

## 2018-11-29 RX ORDER — INFLUENZA VIRUS VACCINE 15; 15; 15; 15 UG/.5ML; UG/.5ML; UG/.5ML; UG/.5ML
0.5 SUSPENSION INTRAMUSCULAR ONCE
Qty: 0 | Refills: 0 | Status: DISCONTINUED | OUTPATIENT
Start: 2018-11-29 | End: 2018-11-30

## 2018-11-29 RX ORDER — MORPHINE SULFATE 50 MG/1
2 CAPSULE, EXTENDED RELEASE ORAL ONCE
Qty: 0 | Refills: 0 | Status: DISCONTINUED | OUTPATIENT
Start: 2018-11-29 | End: 2018-11-29

## 2018-11-29 RX ADMIN — Medication 50 MILLIGRAM(S): at 15:25

## 2018-11-29 RX ADMIN — SODIUM CHLORIDE 1000 MILLILITER(S): 9 INJECTION INTRAMUSCULAR; INTRAVENOUS; SUBCUTANEOUS at 08:27

## 2018-11-29 RX ADMIN — MORPHINE SULFATE 2 MILLIGRAM(S): 50 CAPSULE, EXTENDED RELEASE ORAL at 11:12

## 2018-11-29 RX ADMIN — Medication 50 MILLIGRAM(S): at 17:44

## 2018-11-29 RX ADMIN — PANTOPRAZOLE SODIUM 40 MILLIGRAM(S): 20 TABLET, DELAYED RELEASE ORAL at 17:44

## 2018-11-29 RX ADMIN — SODIUM CHLORIDE 2000 MILLILITER(S): 9 INJECTION INTRAMUSCULAR; INTRAVENOUS; SUBCUTANEOUS at 00:00

## 2018-11-29 RX ADMIN — Medication 50 MILLIGRAM(S): at 23:28

## 2018-11-29 RX ADMIN — Medication 1 MILLIGRAM(S): at 11:12

## 2018-11-29 RX ADMIN — ONDANSETRON 8 MILLIGRAM(S): 8 TABLET, FILM COATED ORAL at 08:32

## 2018-11-29 RX ADMIN — Medication 30 MILLILITER(S): at 19:47

## 2018-11-29 RX ADMIN — SODIUM CHLORIDE 150 MILLILITER(S): 9 INJECTION, SOLUTION INTRAVENOUS at 11:13

## 2018-11-29 RX ADMIN — PANTOPRAZOLE SODIUM 80 MILLIGRAM(S): 20 TABLET, DELAYED RELEASE ORAL at 08:32

## 2018-11-29 RX ADMIN — MORPHINE SULFATE 2 MILLIGRAM(S): 50 CAPSULE, EXTENDED RELEASE ORAL at 11:19

## 2018-11-29 NOTE — ED ADULT NURSE NOTE - NSIMPLEMENTINTERV_GEN_ALL_ED
Implemented All Fall Risk Interventions:  [place] to call system. Call bell, personal items and telephone within reach. Instruct patient to call for assistance. Room bathroom lighting operational. Non-slip footwear when patient is off stretcher. Physically safe environment: no spills, clutter or unnecessary equipment. Stretcher in lowest position, wheels locked, appropriate side rails in place. Provide visual cue, wrist band, yellow gown, etc. Monitor gait and stability. Monitor for mental status changes and reorient to person, place, and time. Review medications for side effects contributing to fall risk. Reinforce activity limits and safety measures with patient and family.

## 2018-11-29 NOTE — H&P ADULT - NSHPPHYSICALEXAM_GEN_ALL_CORE
GENERAL: NAD well-developed  HEAD:  Atraumatic, Normocephalic  EYES: EOMI, PERRLA, conjunctiva and sclera clear  ENMT: No tonsillar erythema, exudates, or enlargement; Moist mucous membranes, Good dentition, No lesions  NECK: Supple, No JVD, Normal thyroid  NERVOUS SYSTEM:  Alert & Oriented X3, Good concentration; Motor Strength 5/5 B/L upper and lower extremities; DTRs 2+ intact and symmetric  CHEST/LUNG: Clear to percussion bilaterally; No rales, rhonchi, wheezing, or rubs  HEART: Regular rate and rhythm; No murmurs, rubs, or gallops  ABDOMEN: Soft, Nontender, Nondistended; Bowel sounds present  EXTREMITIES:  2+ Peripheral Pulses, No clubbing, cyanosis, or edema  LYMPH: No lymphadenopathy   SKIN: No rashes or lesions GENERAL: NAD well-developed  HEAD:  Atraumatic, Normocephalic  EYES: EOMI, PERRLA, conjunctiva and sclera clear  ENMT: No tonsillar erythema, exudates, or enlargement; Moist mucous membranes, Good dentition, No lesions  NECK: Supple, No JVD, Normal thyroid  NERVOUS SYSTEM:  Alert & Oriented X3, Good concentration; Motor Strength 5/5 B/L upper and lower extremities; DTRs 2+ intact and symmetric  CHEST/LUNG: Clear to percussion bilaterally; No rales, rhonchi, wheezing, or rubs  HEART: Regular rate and rhythm; No murmurs, rubs, or gallops  ABDOMEN: Soft, Nontender, Nondistended; Bowel sounds present  EXTREMITIES:  2+ Peripheral Pulses, No clubbing, cyanosis, or edema POSITIVE minor tremors   LYMPH: No lymphadenopathy   SKIN: No rashes or lesions ICU Vital Signs Last 24 Hrs  T(C): 37.4 (29 Nov 2018 10:38), Max: 37.5 (29 Nov 2018 07:59)  T(F): 99.3 (29 Nov 2018 10:38), Max: 99.5 (29 Nov 2018 07:59)  HR: 99 (29 Nov 2018 13:30) (99 - 130)  BP: 121/77 (29 Nov 2018 13:30) (118/69 - 148/102)  BP(mean): --  ABP: --  ABP(mean): --  RR: 18 (29 Nov 2018 13:30) (15 - 20)  SpO2: 98% (29 Nov 2018 13:30) (94% - 98%)      GENERAL: NAD well-developed  HEAD:  Atraumatic, Normocephalic  EYES: EOMI, PERRLA, conjunctiva and sclera clear  ENMT: No tonsillar erythema, exudates, or enlargement; Moist mucous membranes, Good dentition, No lesions  NECK: Supple, No JVD, Normal thyroid  NERVOUS SYSTEM:  Alert & Oriented X3, Good concentration; Motor Strength 5/5 B/L upper and lower extremities; DTRs 2+ intact and symmetric  CHEST/LUNG: Clear to percussion bilaterally; No rales, rhonchi, wheezing, or rubs  HEART: Regular rate and rhythm; No murmurs, rubs, or gallops  ABDOMEN: Soft, Nontender, Nondistended; Bowel sounds present  EXTREMITIES:  2+ Peripheral Pulses, No clubbing, cyanosis, or edema POSITIVE minor tremors   LYMPH: No lymphadenopathy   SKIN: No rashes or lesions

## 2018-11-29 NOTE — ED ADULT NURSE REASSESSMENT NOTE - NS ED NURSE REASSESS COMMENT FT1
patient A&Ox3 in no acute distress no vomiting at this  time , no pain at this time , continue to monitor

## 2018-11-29 NOTE — ED ADULT NURSE NOTE - OBJECTIVE STATEMENT
patient A&Ox3 , patient c/o of abdominal pain , chest pain , back pain , started last night , patient c/o of N/V , stated " I vomit blood started last night " , patient admitted to drink a lot , last drink 2 days ago , patient c/o of headache patient stated he fall in the bathroom 2 days ago and hit his head  md aware

## 2018-11-29 NOTE — H&P ADULT - NSHPLABSRESULTS_GEN_ALL_CORE
15.6   12.27 )-----------( 317      ( 29 Nov 2018 08:32 )             47.5   11-29    140  |  92<L>  |  9   ----------------------------<  152<H>  4.4   |  33<H>  |  1.28    Ca    9.7      29 Nov 2018 08:32  Phos  3.8     11-29  Mg     2.0     11-29    TPro  9.2<H>  /  Alb  4.3  /  TBili  0.9  /  DBili  .23<H>  /  AST  98<H>  /  ALT  129<H>  /  AlkPhos  50  11-29  < from: Xray Chest 1 View AP/PA. (11.29.18 @ 12:12) >      IMPRESSION:   Stable chest.  Clear lungs.    < from: CT Head No Cont (11.29.18 @ 12:06) >    MPRESSION:    1)  the skull base and calvarium appear intact.  2)  no intracerebral hemorrhage or contusion is identified.    < end of copied text >    < from: CT Head No Cont (11.29.18 @ 12:06) >    MPRESSION:    1)  the skull base and calvarium appear intact.  2)  no intracerebral hemorrhage or contusion is identified.

## 2018-11-29 NOTE — ED ADULT TRIAGE NOTE - CHIEF COMPLAINT QUOTE
pt states " since yesterday I have vomited blood and everything that I have eaten. I feel weak and have body aches."

## 2018-11-29 NOTE — H&P ADULT - HISTORY OF PRESENT ILLNESS
· 53 year old male presents today c/o mid abdominal burning associated with multiple episodes of blood vomitus as per patient, he admits to drinking daily, his last drink was two days ago, +headache +tremors, pt states that he feels like he is withdrawing and c/o abdominal pain · 53 year old male presents today c/o mid abdominal burning associated with multiple episodes of blood vomitus as per patient, he admits to drinking daily, his last drink was two days ago, +headache +tremors, pt states that he feels like he is withdrawing and c/o abdominal pain- denies melena but does complain of of throat burning  - patien t had similar attacks in the past - had egd but  does not remember the results -

## 2018-11-29 NOTE — ED ADULT NURSE REASSESSMENT NOTE - NS ED NURSE REASSESS COMMENT FT1
patient A&Ox3 in no acute distress give report to Freida COLLADO RN , denied pain no N/V at this time

## 2018-11-29 NOTE — H&P ADULT - ASSESSMENT
53 male alchohol abuser  zev elevated ciwa score and probable alchohol gastritis returning at least two days in the hospital     IMPROVE VTE Individual Risk Assessment    RISK                                                          Points  [] Previous VTE                                           3  [] Thrombophilia                                        2  [] Lower limb paralysis                              2   [] Current Cancer                                       2   [] Immobilization > 24 hrs                        1  [] ICU/CCU stay > 24 hours                       1  [] Age > 60                                                   1    IMPROVE VTE Score:0

## 2018-11-29 NOTE — ED PROVIDER NOTE - OBJECTIVE STATEMENT
53 year old male presents today c/o mid abdominal burning associated with multiple episodes of blood vomitus as per patient, he admits to drinking daily, his last drink was two days ago, +headache +tremors, pt states that he feels like he is withdrawing and c/o abdominal pain

## 2018-11-30 ENCOUNTER — TRANSCRIPTION ENCOUNTER (OUTPATIENT)
Age: 53
End: 2018-11-30

## 2018-11-30 VITALS
DIASTOLIC BLOOD PRESSURE: 81 MMHG | RESPIRATION RATE: 18 BRPM | HEART RATE: 70 BPM | SYSTOLIC BLOOD PRESSURE: 117 MMHG | OXYGEN SATURATION: 98 % | TEMPERATURE: 98 F

## 2018-11-30 LAB
ANION GAP SERPL CALC-SCNC: 7 MMOL/L — SIGNIFICANT CHANGE UP (ref 5–17)
BUN SERPL-MCNC: 7 MG/DL — SIGNIFICANT CHANGE UP (ref 7–23)
CALCIUM SERPL-MCNC: 7.9 MG/DL — LOW (ref 8.5–10.1)
CHLORIDE SERPL-SCNC: 103 MMOL/L — SIGNIFICANT CHANGE UP (ref 96–108)
CO2 SERPL-SCNC: 30 MMOL/L — SIGNIFICANT CHANGE UP (ref 22–31)
CREAT SERPL-MCNC: 0.91 MG/DL — SIGNIFICANT CHANGE UP (ref 0.5–1.3)
GLUCOSE SERPL-MCNC: 95 MG/DL — SIGNIFICANT CHANGE UP (ref 70–99)
HCT VFR BLD CALC: 42 % — SIGNIFICANT CHANGE UP (ref 39–50)
HGB BLD-MCNC: 13.6 G/DL — SIGNIFICANT CHANGE UP (ref 13–17)
MAGNESIUM SERPL-MCNC: 2.1 MG/DL — SIGNIFICANT CHANGE UP (ref 1.6–2.6)
MCHC RBC-ENTMCNC: 29.2 PG — SIGNIFICANT CHANGE UP (ref 27–34)
MCHC RBC-ENTMCNC: 32.4 GM/DL — SIGNIFICANT CHANGE UP (ref 32–36)
MCV RBC AUTO: 90.3 FL — SIGNIFICANT CHANGE UP (ref 80–100)
NRBC # BLD: 0 /100 WBCS — SIGNIFICANT CHANGE UP (ref 0–0)
PHOSPHATE SERPL-MCNC: 2.7 MG/DL — SIGNIFICANT CHANGE UP (ref 2.5–4.5)
PLATELET # BLD AUTO: 231 K/UL — SIGNIFICANT CHANGE UP (ref 150–400)
POTASSIUM SERPL-MCNC: 3.1 MMOL/L — LOW (ref 3.5–5.3)
POTASSIUM SERPL-SCNC: 3.1 MMOL/L — LOW (ref 3.5–5.3)
RBC # BLD: 4.65 M/UL — SIGNIFICANT CHANGE UP (ref 4.2–5.8)
RBC # FLD: 13.6 % — SIGNIFICANT CHANGE UP (ref 10.3–14.5)
SODIUM SERPL-SCNC: 140 MMOL/L — SIGNIFICANT CHANGE UP (ref 135–145)
WBC # BLD: 3.72 K/UL — LOW (ref 3.8–10.5)
WBC # FLD AUTO: 3.72 K/UL — LOW (ref 3.8–10.5)

## 2018-11-30 PROCEDURE — 99239 HOSP IP/OBS DSCHRG MGMT >30: CPT

## 2018-11-30 RX ORDER — PANTOPRAZOLE SODIUM 20 MG/1
40 TABLET, DELAYED RELEASE ORAL
Qty: 0 | Refills: 0 | Status: DISCONTINUED | OUTPATIENT
Start: 2018-11-30 | End: 2018-11-30

## 2018-11-30 RX ORDER — PANTOPRAZOLE SODIUM 20 MG/1
1 TABLET, DELAYED RELEASE ORAL
Qty: 30 | Refills: 0 | OUTPATIENT
Start: 2018-11-30

## 2018-11-30 RX ORDER — POTASSIUM CHLORIDE 20 MEQ
40 PACKET (EA) ORAL EVERY 4 HOURS
Qty: 0 | Refills: 0 | Status: DISCONTINUED | OUTPATIENT
Start: 2018-11-30 | End: 2018-11-30

## 2018-11-30 RX ORDER — FAMOTIDINE 10 MG/ML
1 INJECTION INTRAVENOUS
Qty: 60 | Refills: 0 | OUTPATIENT
Start: 2018-11-30 | End: 2018-12-29

## 2018-11-30 RX ADMIN — Medication 40 MILLIEQUIVALENT(S): at 08:44

## 2018-11-30 RX ADMIN — PANTOPRAZOLE SODIUM 40 MILLIGRAM(S): 20 TABLET, DELAYED RELEASE ORAL at 05:20

## 2018-11-30 RX ADMIN — Medication 50 MILLIGRAM(S): at 13:19

## 2018-11-30 RX ADMIN — Medication 50 MILLIGRAM(S): at 05:20

## 2018-11-30 RX ADMIN — Medication 30 MILLILITER(S): at 05:20

## 2018-11-30 NOTE — DISCHARGE NOTE ADULT - PLAN OF CARE
please stop drinking please  follow up with GI   please take your medications resolved please follow up with GI please refrain from drinking

## 2018-11-30 NOTE — PROGRESS NOTE ADULT - SUBJECTIVE AND OBJECTIVE BOX
Pt feeling better  no bleeding      MEDICATIONS  (STANDING):  chlordiazePOXIDE   Oral   chlordiazePOXIDE 50 milliGRAM(s) Oral every 8 hours  influenza   Vaccine 0.5 milliLiter(s) IntraMuscular once  pantoprazole  Injectable 40 milliGRAM(s) IV Push every 12 hours  potassium chloride    Tablet ER 40 milliEquivalent(s) Oral every 4 hours    MEDICATIONS  (PRN):  aluminum hydroxide/magnesium hydroxide/simethicone Suspension 30 milliLiter(s) Oral every 6 hours PRN Dyspepsia      Allergies    No Known Allergies    Intolerances        Vital Signs Last 24 Hrs  T(C): 36.6 (30 Nov 2018 05:44), Max: 37.4 (29 Nov 2018 10:38)  T(F): 97.8 (30 Nov 2018 05:44), Max: 99.3 (29 Nov 2018 10:38)  HR: 66 (30 Nov 2018 05:44) (66 - 113)  BP: 120/68 (30 Nov 2018 05:44) (118/69 - 147/92)  BP(mean): --  RR: 17 (30 Nov 2018 05:44) (17 - 18)  SpO2: 98% (30 Nov 2018 05:44) (94% - 99%)    PHYSICAL EXAM:  General: NAD.  CVS: S1, S2  Chest: air entry bilaterally present  Abd: BS present, soft, non-tender      LABS:                        13.6   3.72  )-----------( 231      ( 30 Nov 2018 07:17 )             42.0     11-30    140  |  103  |  7   ----------------------------<  95  3.1<L>   |  30  |  0.91    Ca    7.9<L>      30 Nov 2018 07:17  Phos  2.7     11-30  Mg     2.1     11-30    TPro  7.0  /  Alb  3.2<L>  /  TBili  0.7  /  DBili  .21<H>  /  AST  54<H>  /  ALT  89<H>  /  AlkPhos  41  11-29        advance diet  change protonix to PO

## 2018-11-30 NOTE — DISCHARGE NOTE ADULT - HOSPITAL COURSE
History of Present Illness:  History of Present Illness: 	  · 53 year old male presents today c/o mid abdominal burning associated with multiple episodes of blood vomitus as per patient, he admits to drinking daily, his last drink was two days ago, +headache +tremors, pt states that he feels like he is withdrawing and c/o abdominal pain- denies melena but does complain of of throat burning  - patien t had similar attacks in the past - had egd but  does not remember the results    Patient observed in hospital overnight on CIWA-A protocol  no further signs of bleeding desired to go home

## 2018-11-30 NOTE — DISCHARGE NOTE ADULT - MEDICATION SUMMARY - MEDICATIONS TO TAKE
I will START or STAY ON the medications listed below when I get home from the hospital:    aluminum hydroxide-magnesium hydroxide 200 mg-200 mg/5 mL oral suspension  -- 30 milliliter(s) by mouth every 6 hours, As needed, Dyspepsia  -- Indication: For PUD (peptic ulcer disease)    Pepcid 20 mg oral tablet  -- 1 tab(s) by mouth 2 times a day   -- It is very important that you take or use this exactly as directed.  Do not skip doses or discontinue unless directed by your doctor.  Obtain medical advice before taking any non-prescription drugs as some may affect the action of this medication.    -- Indication: For PUD (peptic ulcer disease)    pantoprazole 40 mg oral delayed release tablet  -- 1 tab(s) by mouth once a day (before a meal)  -- Indication: For Upper gastrointestinal bleed

## 2018-11-30 NOTE — DISCHARGE NOTE ADULT - CARE PLAN
Principal Discharge DX:	PUD (peptic ulcer disease)  Goal:	please stop drinking  Assessment and plan of treatment:	please  follow up with GI   please take your medications  Secondary Diagnosis:	Upper gastrointestinal bleed  Assessment and plan of treatment:	resolved please follow up with GI  Secondary Diagnosis:	Alcohol dependence with uncomplicated withdrawal  Assessment and plan of treatment:	please refrain from drinking

## 2018-11-30 NOTE — DISCHARGE NOTE ADULT - PATIENT PORTAL LINK FT
You can access the [facility] Patient [facility], offered by [facility], by registering with the following website: [email or web address]

## 2018-12-04 DIAGNOSIS — K92.0 HEMATEMESIS: ICD-10-CM

## 2018-12-04 DIAGNOSIS — F10.231 ALCOHOL DEPENDENCE WITH WITHDRAWAL DELIRIUM: ICD-10-CM

## 2018-12-04 DIAGNOSIS — K29.71 GASTRITIS, UNSPECIFIED, WITH BLEEDING: ICD-10-CM
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
## 2018-12-04 DIAGNOSIS — K25.7 CHRONIC GASTRIC ULCER WITHOUT HEMORRHAGE OR PERFORATION: ICD-10-CM

## 2018-12-04 DIAGNOSIS — E78.2 MIXED HYPERLIPIDEMIA: ICD-10-CM

## 2018-12-04 DIAGNOSIS — K76.0 FATTY (CHANGE OF) LIVER, NOT ELSEWHERE CLASSIFIED: ICD-10-CM

## 2019-01-11 ENCOUNTER — INPATIENT (INPATIENT)
Facility: HOSPITAL | Age: 54
LOS: 19 days | Discharge: HOME | End: 2019-01-31
Attending: HOSPITALIST | Admitting: HOSPITALIST
Payer: MEDICAID

## 2019-01-11 VITALS
TEMPERATURE: 99 F | HEART RATE: 149 BPM | OXYGEN SATURATION: 96 % | DIASTOLIC BLOOD PRESSURE: 110 MMHG | HEIGHT: 67 IN | WEIGHT: 186.07 LBS | SYSTOLIC BLOOD PRESSURE: 148 MMHG | RESPIRATION RATE: 19 BRPM

## 2019-01-11 LAB
ALBUMIN SERPL ELPH-MCNC: 4.5 G/DL — SIGNIFICANT CHANGE UP (ref 3.3–5)
ALP SERPL-CCNC: 44 U/L — SIGNIFICANT CHANGE UP (ref 40–120)
ALT FLD-CCNC: 127 U/L — HIGH (ref 12–78)
ANION GAP SERPL CALC-SCNC: 14 MMOL/L — SIGNIFICANT CHANGE UP (ref 5–17)
APPEARANCE UR: CLEAR — SIGNIFICANT CHANGE UP
APTT BLD: 26.3 SEC — LOW (ref 27.5–36.3)
AST SERPL-CCNC: 97 U/L — HIGH (ref 15–37)
BILIRUB SERPL-MCNC: 0.6 MG/DL — SIGNIFICANT CHANGE UP (ref 0.2–1.2)
BILIRUB UR-MCNC: NEGATIVE — SIGNIFICANT CHANGE UP
BUN SERPL-MCNC: 12 MG/DL — SIGNIFICANT CHANGE UP (ref 7–23)
CALCIUM SERPL-MCNC: 10.2 MG/DL — HIGH (ref 8.5–10.1)
CHLORIDE SERPL-SCNC: 102 MMOL/L — SIGNIFICANT CHANGE UP (ref 96–108)
CO2 SERPL-SCNC: 28 MMOL/L — SIGNIFICANT CHANGE UP (ref 22–31)
COLOR SPEC: YELLOW — SIGNIFICANT CHANGE UP
CREAT SERPL-MCNC: 1.14 MG/DL — SIGNIFICANT CHANGE UP (ref 0.5–1.3)
DIFF PNL FLD: NEGATIVE — SIGNIFICANT CHANGE UP
GLUCOSE BLDC GLUCOMTR-MCNC: 157 MG/DL — HIGH (ref 70–99)
GLUCOSE SERPL-MCNC: 147 MG/DL — HIGH (ref 70–99)
GLUCOSE UR QL: NEGATIVE MG/DL — SIGNIFICANT CHANGE UP
HCT VFR BLD CALC: 46.6 % — SIGNIFICANT CHANGE UP (ref 39–50)
HGB BLD-MCNC: 15.6 G/DL — SIGNIFICANT CHANGE UP (ref 13–17)
INR BLD: 1 RATIO — SIGNIFICANT CHANGE UP (ref 0.88–1.16)
KETONES UR-MCNC: ABNORMAL
LEUKOCYTE ESTERASE UR-ACNC: ABNORMAL
LIDOCAIN IGE QN: 248 U/L — SIGNIFICANT CHANGE UP (ref 73–393)
MCHC RBC-ENTMCNC: 29.5 PG — SIGNIFICANT CHANGE UP (ref 27–34)
MCHC RBC-ENTMCNC: 33.5 GM/DL — SIGNIFICANT CHANGE UP (ref 32–36)
MCV RBC AUTO: 88.1 FL — SIGNIFICANT CHANGE UP (ref 80–100)
NITRITE UR-MCNC: NEGATIVE — SIGNIFICANT CHANGE UP
NRBC # BLD: 0 /100 WBCS — SIGNIFICANT CHANGE UP (ref 0–0)
PCP SPEC-MCNC: SIGNIFICANT CHANGE UP
PH UR: 9 — HIGH (ref 5–8)
PLATELET # BLD AUTO: 251 K/UL — SIGNIFICANT CHANGE UP (ref 150–400)
POTASSIUM SERPL-MCNC: 4.1 MMOL/L — SIGNIFICANT CHANGE UP (ref 3.5–5.3)
POTASSIUM SERPL-SCNC: 4.1 MMOL/L — SIGNIFICANT CHANGE UP (ref 3.5–5.3)
PROT SERPL-MCNC: 9.3 GM/DL — HIGH (ref 6–8.3)
PROT UR-MCNC: 100 MG/DL
PROTHROM AB SERPL-ACNC: 11.2 SEC — SIGNIFICANT CHANGE UP (ref 10–12.9)
RBC # BLD: 5.29 M/UL — SIGNIFICANT CHANGE UP (ref 4.2–5.8)
RBC # FLD: 13.5 % — SIGNIFICANT CHANGE UP (ref 10.3–14.5)
SODIUM SERPL-SCNC: 144 MMOL/L — SIGNIFICANT CHANGE UP (ref 135–145)
SP GR SPEC: 1.01 — SIGNIFICANT CHANGE UP (ref 1.01–1.02)
TROPONIN I SERPL-MCNC: <.015 NG/ML — SIGNIFICANT CHANGE UP (ref 0.01–0.04)
UROBILINOGEN FLD QL: NEGATIVE MG/DL — SIGNIFICANT CHANGE UP
WBC # BLD: 4.55 K/UL — SIGNIFICANT CHANGE UP (ref 3.8–10.5)
WBC # FLD AUTO: 4.55 K/UL — SIGNIFICANT CHANGE UP (ref 3.8–10.5)

## 2019-01-11 PROCEDURE — 71045 X-RAY EXAM CHEST 1 VIEW: CPT | Mod: 26

## 2019-01-11 PROCEDURE — 99223 1ST HOSP IP/OBS HIGH 75: CPT | Mod: AI

## 2019-01-11 PROCEDURE — 99285 EMERGENCY DEPT VISIT HI MDM: CPT

## 2019-01-11 RX ORDER — FAMOTIDINE 10 MG/ML
20 INJECTION INTRAVENOUS ONCE
Qty: 0 | Refills: 0 | Status: COMPLETED | OUTPATIENT
Start: 2019-01-11 | End: 2019-01-11

## 2019-01-11 RX ORDER — SODIUM CHLORIDE 9 MG/ML
2000 INJECTION INTRAMUSCULAR; INTRAVENOUS; SUBCUTANEOUS ONCE
Qty: 0 | Refills: 0 | Status: COMPLETED | OUTPATIENT
Start: 2019-01-11 | End: 2019-01-11

## 2019-01-11 RX ORDER — ONDANSETRON 8 MG/1
8 TABLET, FILM COATED ORAL ONCE
Qty: 0 | Refills: 0 | Status: COMPLETED | OUTPATIENT
Start: 2019-01-11 | End: 2019-01-11

## 2019-01-11 RX ORDER — SODIUM CHLORIDE 9 MG/ML
1000 INJECTION, SOLUTION INTRAVENOUS
Qty: 0 | Refills: 0 | Status: COMPLETED | OUTPATIENT
Start: 2019-01-11 | End: 2019-01-11

## 2019-01-11 RX ADMIN — SODIUM CHLORIDE 200 MILLILITER(S): 9 INJECTION, SOLUTION INTRAVENOUS at 20:03

## 2019-01-11 RX ADMIN — SODIUM CHLORIDE 2000 MILLILITER(S): 9 INJECTION INTRAMUSCULAR; INTRAVENOUS; SUBCUTANEOUS at 20:06

## 2019-01-11 RX ADMIN — Medication 2 MILLIGRAM(S): at 20:04

## 2019-01-11 RX ADMIN — FAMOTIDINE 20 MILLIGRAM(S): 10 INJECTION INTRAVENOUS at 20:05

## 2019-01-11 RX ADMIN — ONDANSETRON 8 MILLIGRAM(S): 8 TABLET, FILM COATED ORAL at 20:04

## 2019-01-11 NOTE — ED PROVIDER NOTE - OBJECTIVE STATEMENT
52yo male with pmh alcohol abuse, PID, presents with whole body pain, tremors, upper abd pain and vomiting. Last drink yesterday. no fever.     ROS: No fever/chills. No photophobia/eye pain/changes in vision, No ear pain/sore throat/dysphagia, No chest pain/palpitations. No SOB/cough/stridor. +abdominal pain, +N/V, no D, no black/bloody bm. No dysuria/frequency/discharge, + headache. No Dizziness.  No rash.  No numbness/tingling/weakness.

## 2019-01-11 NOTE — ED PROVIDER NOTE - PHYSICAL EXAMINATION
Gen: Alert, ++ tremors  Head: NC, AT, PERRL, EOMI, normal lids/conjunctiva   ENT: Bilateral TM WNL, normal hearing, patent oropharynx without erythema/exudate, uvula midline  Neck: supple, no tenderness/meningismus/JVD   Pulm: Bilateral clear BS, normal resp effort, no wheeze/stridor/retractions  CV: RRR, no M/R/G, +dist pulses   Abd: soft,  + upper abd pain, ND, +BS, no guarding/rebound tenderness  Mskel: no edema/erythema/cyanosis   Skin: no rash   Neuro: AAOx3, no sensory/motor deficits, CN 2-12 intact

## 2019-01-11 NOTE — ED ADULT TRIAGE NOTE - CHIEF COMPLAINT QUOTE
patient c/o of body pain with N/V started this morning , patient last drink 2 days ago , patient c/o of chest pain

## 2019-01-12 DIAGNOSIS — K29.21 ALCOHOLIC GASTRITIS WITH BLEEDING: ICD-10-CM

## 2019-01-12 PROBLEM — F10.20 ALCOHOL DEPENDENCE, UNCOMPLICATED: Chronic | Status: ACTIVE | Noted: 2018-11-29

## 2019-01-12 LAB
ALBUMIN SERPL ELPH-MCNC: 3.6 G/DL — SIGNIFICANT CHANGE UP (ref 3.3–5)
ALP SERPL-CCNC: 35 U/L — LOW (ref 40–120)
ALT FLD-CCNC: 89 U/L — HIGH (ref 12–78)
ANION GAP SERPL CALC-SCNC: 8 MMOL/L — SIGNIFICANT CHANGE UP (ref 5–17)
AST SERPL-CCNC: 52 U/L — HIGH (ref 15–37)
BACTERIA # UR AUTO: ABNORMAL
BILIRUB DIRECT SERPL-MCNC: 0.25 MG/DL — HIGH (ref 0.05–0.2)
BILIRUB INDIRECT FLD-MCNC: 0.6 MG/DL — SIGNIFICANT CHANGE UP (ref 0.2–1)
BILIRUB SERPL-MCNC: 0.8 MG/DL — SIGNIFICANT CHANGE UP (ref 0.2–1.2)
BUN SERPL-MCNC: 11 MG/DL — SIGNIFICANT CHANGE UP (ref 7–23)
CALCIUM SERPL-MCNC: 8.1 MG/DL — LOW (ref 8.5–10.1)
CHLORIDE SERPL-SCNC: 110 MMOL/L — HIGH (ref 96–108)
CO2 SERPL-SCNC: 30 MMOL/L — SIGNIFICANT CHANGE UP (ref 22–31)
COMMENT - URINE: SIGNIFICANT CHANGE UP
CREAT SERPL-MCNC: 0.85 MG/DL — SIGNIFICANT CHANGE UP (ref 0.5–1.3)
EPI CELLS # UR: SIGNIFICANT CHANGE UP
GLUCOSE SERPL-MCNC: 89 MG/DL — SIGNIFICANT CHANGE UP (ref 70–99)
HCT VFR BLD CALC: 39.2 % — SIGNIFICANT CHANGE UP (ref 39–50)
HGB BLD-MCNC: 13 G/DL — SIGNIFICANT CHANGE UP (ref 13–17)
HYALINE CASTS # UR AUTO: ABNORMAL /LPF
MAGNESIUM SERPL-MCNC: 1.9 MG/DL — SIGNIFICANT CHANGE UP (ref 1.6–2.6)
MCHC RBC-ENTMCNC: 30 PG — SIGNIFICANT CHANGE UP (ref 27–34)
MCHC RBC-ENTMCNC: 33.2 GM/DL — SIGNIFICANT CHANGE UP (ref 32–36)
MCV RBC AUTO: 90.3 FL — SIGNIFICANT CHANGE UP (ref 80–100)
NRBC # BLD: 0 /100 WBCS — SIGNIFICANT CHANGE UP (ref 0–0)
PHOSPHATE SERPL-MCNC: 2.7 MG/DL — SIGNIFICANT CHANGE UP (ref 2.5–4.5)
PLATELET # BLD AUTO: 211 K/UL — SIGNIFICANT CHANGE UP (ref 150–400)
POTASSIUM SERPL-MCNC: 3.1 MMOL/L — LOW (ref 3.5–5.3)
POTASSIUM SERPL-SCNC: 3.1 MMOL/L — LOW (ref 3.5–5.3)
PROT SERPL-MCNC: 7 GM/DL — SIGNIFICANT CHANGE UP (ref 6–8.3)
RBC # BLD: 4.34 M/UL — SIGNIFICANT CHANGE UP (ref 4.2–5.8)
RBC # FLD: 13.6 % — SIGNIFICANT CHANGE UP (ref 10.3–14.5)
RBC CASTS # UR COMP ASSIST: SIGNIFICANT CHANGE UP /HPF (ref 0–4)
SODIUM SERPL-SCNC: 148 MMOL/L — HIGH (ref 135–145)
WBC # BLD: 4.1 K/UL — SIGNIFICANT CHANGE UP (ref 3.8–10.5)
WBC # FLD AUTO: 4.1 K/UL — SIGNIFICANT CHANGE UP (ref 3.8–10.5)
WBC UR QL: SIGNIFICANT CHANGE UP

## 2019-01-12 PROCEDURE — 12345: CPT | Mod: NC

## 2019-01-12 RX ORDER — PANTOPRAZOLE SODIUM 20 MG/1
8 TABLET, DELAYED RELEASE ORAL
Qty: 80 | Refills: 0 | Status: DISCONTINUED | OUTPATIENT
Start: 2019-01-12 | End: 2019-01-13

## 2019-01-12 RX ORDER — ACETAMINOPHEN 500 MG
650 TABLET ORAL ONCE
Qty: 0 | Refills: 0 | Status: COMPLETED | OUTPATIENT
Start: 2019-01-12 | End: 2019-01-12

## 2019-01-12 RX ORDER — FOLIC ACID 0.8 MG
1 TABLET ORAL DAILY
Qty: 0 | Refills: 0 | Status: DISCONTINUED | OUTPATIENT
Start: 2019-01-12 | End: 2019-01-14

## 2019-01-12 RX ORDER — THIAMINE MONONITRATE (VIT B1) 100 MG
100 TABLET ORAL DAILY
Qty: 0 | Refills: 0 | Status: DISCONTINUED | OUTPATIENT
Start: 2019-01-12 | End: 2019-01-14

## 2019-01-12 RX ORDER — INFLUENZA VIRUS VACCINE 15; 15; 15; 15 UG/.5ML; UG/.5ML; UG/.5ML; UG/.5ML
0.5 SUSPENSION INTRAMUSCULAR ONCE
Qty: 0 | Refills: 0 | Status: COMPLETED | OUTPATIENT
Start: 2019-01-12 | End: 2019-01-31

## 2019-01-12 RX ORDER — POTASSIUM CHLORIDE 20 MEQ
40 PACKET (EA) ORAL ONCE
Qty: 0 | Refills: 0 | Status: COMPLETED | OUTPATIENT
Start: 2019-01-12 | End: 2019-01-12

## 2019-01-12 RX ORDER — ONDANSETRON 8 MG/1
4 TABLET, FILM COATED ORAL EVERY 6 HOURS
Qty: 0 | Refills: 0 | Status: DISCONTINUED | OUTPATIENT
Start: 2019-01-12 | End: 2019-01-31

## 2019-01-12 RX ORDER — POTASSIUM CHLORIDE 20 MEQ
20 PACKET (EA) ORAL
Qty: 0 | Refills: 0 | Status: COMPLETED | OUTPATIENT
Start: 2019-01-12 | End: 2019-01-12

## 2019-01-12 RX ADMIN — Medication 650 MILLIGRAM(S): at 17:41

## 2019-01-12 RX ADMIN — Medication 3 MILLIGRAM(S): at 22:46

## 2019-01-12 RX ADMIN — Medication 4 MILLIGRAM(S): at 02:18

## 2019-01-12 RX ADMIN — PANTOPRAZOLE SODIUM 10 MG/HR: 20 TABLET, DELAYED RELEASE ORAL at 01:15

## 2019-01-12 RX ADMIN — Medication 650 MILLIGRAM(S): at 01:53

## 2019-01-12 RX ADMIN — Medication 4 MILLIGRAM(S): at 10:33

## 2019-01-12 RX ADMIN — Medication 4 MILLIGRAM(S): at 00:53

## 2019-01-12 RX ADMIN — Medication 650 MILLIGRAM(S): at 16:46

## 2019-01-12 RX ADMIN — ONDANSETRON 4 MILLIGRAM(S): 8 TABLET, FILM COATED ORAL at 19:37

## 2019-01-12 RX ADMIN — Medication 100 MILLIGRAM(S): at 14:29

## 2019-01-12 RX ADMIN — Medication 20 MILLIEQUIVALENT(S): at 22:46

## 2019-01-12 RX ADMIN — Medication 40 MILLIEQUIVALENT(S): at 19:37

## 2019-01-12 RX ADMIN — Medication 4 MILLIGRAM(S): at 05:20

## 2019-01-12 RX ADMIN — Medication 20 MILLIEQUIVALENT(S): at 21:01

## 2019-01-12 RX ADMIN — Medication 20 MILLIEQUIVALENT(S): at 18:14

## 2019-01-12 RX ADMIN — Medication 4 MILLIGRAM(S): at 18:14

## 2019-01-12 RX ADMIN — Medication 1 MILLIGRAM(S): at 14:29

## 2019-01-12 RX ADMIN — Medication 4 MILLIGRAM(S): at 14:30

## 2019-01-12 RX ADMIN — Medication 650 MILLIGRAM(S): at 00:53

## 2019-01-12 NOTE — CHART NOTE - NSCHARTNOTEFT_GEN_A_CORE
54 y/o male w/PMH gerd, HLD, etoh abuse presents with complaint of tremors, abdominal pain, nausea, and body aches.  States drinks whiskey regularly cant quantify amount, about 4 days ago started to drink a lot more than usual as was w/friends, and states started to get abdominal pains and unable to keep anything down over the last 2 days, last drink 2 days, ago, first reports was just throwing up food material then dark coffee ground like. no melena or brbpr.  Now complains of burning epigastric pain.  Continue ativan per CIWA  On PPI drip  Hb stable  Keep NPO now, advance in am if stable

## 2019-01-12 NOTE — H&P ADULT - NSHPLABSRESULTS_GEN_ALL_CORE
LABS:                        15.6   4.55  )-----------( 251      ( 2019 20:12 )             46.6         144  |  102  |  12  ----------------------------<  147<H>  4.1   |  28  |  1.14    Ca    10.2<H>      2019 20:12    TPro  9.3<H>  /  Alb  4.5  /  TBili  0.6  /  DBili  x   /  AST  97<H>  /  ALT  127<H>  /  AlkPhos  44  -11    PT/INR - ( 2019 20:12 )   PT: 11.2 sec;   INR: 1.00 ratio         PTT - ( 2019 20:12 )  PTT:26.3 sec  Urinalysis Basic - ( 2019 23:08 )    Color: Yellow / Appearance: Clear / S.015 / pH: x  Gluc: x / Ketone: Trace  / Bili: Negative / Urobili: Negative mg/dL   Blood: x / Protein: 100 mg/dL / Nitrite: Negative   Leuk Esterase: Trace / RBC: 0-2 /HPF / WBC 0-2   Sq Epi: x / Non Sq Epi: Few / Bacteria: Few      CAPILLARY BLOOD GLUCOSE      POCT Blood Glucose.: 157 mg/dL (2019 18:00)      RADIOLOGY & ADDITIONAL TESTS:    Imaging Personally Reviewed:  [ X] YES  [ ] NO

## 2019-01-12 NOTE — H&P ADULT - FAMILY HISTORY
Father  Still living? Yes, Estimated age: Age Unknown  No pertinent family history in first degree relatives, Age at diagnosis: Age Unknown     Mother  Still living? Yes, Estimated age: Age Unknown  No pertinent family history in first degree relatives, Age at diagnosis: Age Unknown

## 2019-01-12 NOTE — H&P ADULT - HISTORY OF PRESENT ILLNESS
Pt is a 52 y/o male w/PMH gerd, HLD, etoh abuse presents with complaint of tremors, abdominal pain, nausea, and body aches.  States drinks whiskey regularly cant quantify amount, about 4 days ago started to drink alot more than usual as was w/friends, and states started to get abdominal pains and unable to keep anything down over the last 2 days, last drink 2 days, ago, first reports was just throwing up food material then dark coffee ground like. no melena or brbpr.    pt denies any fever, chills, sob, cp, palpitations, +n/+v/-d/-c no travels or sick cotnacts.

## 2019-01-12 NOTE — H&P ADULT - NSHPPHYSICALEXAM_GEN_ALL_CORE
Vital Signs Last 24 Hrs  T(C): 37.5 (11 Jan 2019 23:46), Max: 37.5 (11 Jan 2019 23:46)  T(F): 99.5 (11 Jan 2019 23:46), Max: 99.5 (11 Jan 2019 23:46)  HR: 66 (11 Jan 2019 23:46) (66 - 149)  BP: 149/88 (11 Jan 2019 23:46) (142/90 - 149/88)  BP(mean): --  RR: 16 (11 Jan 2019 23:46) (16 - 19)  SpO2: 98% (11 Jan 2019 23:46) (96% - 98%)    PHYSICAL EXAM:    GENERAL: NAD, well-groomed, well-developed  HEAD:  Atraumatic, Normocephalic  EYES: EOMI, PERRLA, conjunctiva and sclera clear  ENMT: No tonsillar erythema, exudates, or enlargement; Moist mucous membranes, No lesions  NECK: Supple, No JVD, Normal thyroid  NERVOUS SYSTEM:  Alert & Oriented X3, Good concentration; Motor Strength 5/5 B/L upper and lower extremities; DTRs 2+ intact and symmetric  CHEST/LUNG: Clear to percussion bilaterally; No rales, rhonchi, wheezing, or rubs  HEART: Regular rate and rhythm; No rubs, or gallops, +S1,S2  ABDOMEN: +epigastric tenderness, +bs, no rebound or guarding  EXTREMITIES:  2+ Peripheral Pulses, No clubbing, cyanosis, or edema  LYMPH: No cervical adenopathy  RECTAL: deferred  BREAST: No palpatble masses, skin no lesions   : deferred  SKIN: No rashes or lesions    IMPROVE VTE Individual Risk Assessment          RISK                                                          Points  [  ] Previous VTE                                                3  [  ] Thrombophilia                                             2  [  ] Lower limb paralysis                                    2        (unable to hold up >15 seconds)    [  ] Current Cancer                                             2         (within 6 months)  [  ] Immobilization > 24 hrs                              1  [  ] ICU/CCU stay > 24 hours                            1  [  ] Age > 60                                                    1  IMPROVE VTE Score ____0_____

## 2019-01-12 NOTE — PATIENT PROFILE ADULT - DOES PATIENT HAVE ADVANCE DIRECTIVE
Please let patient know she should be following a low-cholesterol diet and doing regular exercise. Please send her results. She should discuss results with her primary care physician to find out when they would like to retest her.  ms
no

## 2019-01-13 LAB
ANION GAP SERPL CALC-SCNC: 9 MMOL/L — SIGNIFICANT CHANGE UP (ref 5–17)
BUN SERPL-MCNC: 9 MG/DL — SIGNIFICANT CHANGE UP (ref 7–23)
CALCIUM SERPL-MCNC: 8.8 MG/DL — SIGNIFICANT CHANGE UP (ref 8.5–10.1)
CHLORIDE SERPL-SCNC: 109 MMOL/L — HIGH (ref 96–108)
CO2 SERPL-SCNC: 23 MMOL/L — SIGNIFICANT CHANGE UP (ref 22–31)
CREAT SERPL-MCNC: 0.94 MG/DL — SIGNIFICANT CHANGE UP (ref 0.5–1.3)
GLUCOSE SERPL-MCNC: 90 MG/DL — SIGNIFICANT CHANGE UP (ref 70–99)
HCT VFR BLD CALC: 41.7 % — SIGNIFICANT CHANGE UP (ref 39–50)
HGB BLD-MCNC: 13.6 G/DL — SIGNIFICANT CHANGE UP (ref 13–17)
MAGNESIUM SERPL-MCNC: 2.2 MG/DL — SIGNIFICANT CHANGE UP (ref 1.6–2.6)
MCHC RBC-ENTMCNC: 29.6 PG — SIGNIFICANT CHANGE UP (ref 27–34)
MCHC RBC-ENTMCNC: 32.6 GM/DL — SIGNIFICANT CHANGE UP (ref 32–36)
MCV RBC AUTO: 90.8 FL — SIGNIFICANT CHANGE UP (ref 80–100)
NRBC # BLD: 0 /100 WBCS — SIGNIFICANT CHANGE UP (ref 0–0)
PHOSPHATE SERPL-MCNC: 2.6 MG/DL — SIGNIFICANT CHANGE UP (ref 2.5–4.5)
PLATELET # BLD AUTO: 182 K/UL — SIGNIFICANT CHANGE UP (ref 150–400)
POTASSIUM SERPL-MCNC: 3.9 MMOL/L — SIGNIFICANT CHANGE UP (ref 3.5–5.3)
POTASSIUM SERPL-SCNC: 3.9 MMOL/L — SIGNIFICANT CHANGE UP (ref 3.5–5.3)
RBC # BLD: 4.59 M/UL — SIGNIFICANT CHANGE UP (ref 4.2–5.8)
RBC # FLD: 13.1 % — SIGNIFICANT CHANGE UP (ref 10.3–14.5)
SODIUM SERPL-SCNC: 141 MMOL/L — SIGNIFICANT CHANGE UP (ref 135–145)
WBC # BLD: 3.33 K/UL — LOW (ref 3.8–10.5)
WBC # FLD AUTO: 3.33 K/UL — LOW (ref 3.8–10.5)

## 2019-01-13 PROCEDURE — 99291 CRITICAL CARE FIRST HOUR: CPT

## 2019-01-13 RX ORDER — PHENOBARBITAL 60 MG
260 TABLET ORAL ONCE
Qty: 0 | Refills: 0 | Status: DISCONTINUED | OUTPATIENT
Start: 2019-01-13 | End: 2019-01-13

## 2019-01-13 RX ORDER — DEXMEDETOMIDINE HYDROCHLORIDE IN 0.9% SODIUM CHLORIDE 4 UG/ML
0.7 INJECTION INTRAVENOUS
Qty: 200 | Refills: 0 | Status: DISCONTINUED | OUTPATIENT
Start: 2019-01-13 | End: 2019-01-18

## 2019-01-13 RX ORDER — MIDAZOLAM HYDROCHLORIDE 1 MG/ML
2 INJECTION, SOLUTION INTRAMUSCULAR; INTRAVENOUS ONCE
Qty: 0 | Refills: 0 | Status: DISCONTINUED | OUTPATIENT
Start: 2019-01-13 | End: 2019-01-13

## 2019-01-13 RX ORDER — PHENOBARBITAL 60 MG
65 TABLET ORAL ONCE
Qty: 0 | Refills: 0 | Status: DISCONTINUED | OUTPATIENT
Start: 2019-01-13 | End: 2019-01-13

## 2019-01-13 RX ORDER — PHENOBARBITAL 60 MG
130 TABLET ORAL ONCE
Qty: 0 | Refills: 0 | Status: DISCONTINUED | OUTPATIENT
Start: 2019-01-13 | End: 2019-01-13

## 2019-01-13 RX ORDER — SODIUM CHLORIDE 9 MG/ML
1000 INJECTION, SOLUTION INTRAVENOUS
Qty: 0 | Refills: 0 | Status: DISCONTINUED | OUTPATIENT
Start: 2019-01-13 | End: 2019-01-16

## 2019-01-13 RX ORDER — PHENOBARBITAL 60 MG
130 TABLET ORAL EVERY 6 HOURS
Qty: 0 | Refills: 0 | Status: DISCONTINUED | OUTPATIENT
Start: 2019-01-13 | End: 2019-01-14

## 2019-01-13 RX ORDER — PANTOPRAZOLE SODIUM 20 MG/1
40 TABLET, DELAYED RELEASE ORAL EVERY 12 HOURS
Qty: 0 | Refills: 0 | Status: DISCONTINUED | OUTPATIENT
Start: 2019-01-13 | End: 2019-01-27

## 2019-01-13 RX ORDER — ENOXAPARIN SODIUM 100 MG/ML
40 INJECTION SUBCUTANEOUS EVERY 24 HOURS
Qty: 0 | Refills: 0 | Status: DISCONTINUED | OUTPATIENT
Start: 2019-01-13 | End: 2019-01-31

## 2019-01-13 RX ADMIN — Medication 260 MILLIGRAM(S): at 21:55

## 2019-01-13 RX ADMIN — MIDAZOLAM HYDROCHLORIDE 2 MILLIGRAM(S): 1 INJECTION, SOLUTION INTRAMUSCULAR; INTRAVENOUS at 10:40

## 2019-01-13 RX ADMIN — PANTOPRAZOLE SODIUM 40 MILLIGRAM(S): 20 TABLET, DELAYED RELEASE ORAL at 18:10

## 2019-01-13 RX ADMIN — ENOXAPARIN SODIUM 40 MILLIGRAM(S): 100 INJECTION SUBCUTANEOUS at 18:10

## 2019-01-13 RX ADMIN — DEXMEDETOMIDINE HYDROCHLORIDE IN 0.9% SODIUM CHLORIDE 14.77 MICROGRAM(S)/KG/HR: 4 INJECTION INTRAVENOUS at 10:45

## 2019-01-13 RX ADMIN — Medication 3 MILLIGRAM(S): at 01:38

## 2019-01-13 RX ADMIN — Medication 130 MILLIGRAM(S): at 12:00

## 2019-01-13 RX ADMIN — DEXMEDETOMIDINE HYDROCHLORIDE IN 0.9% SODIUM CHLORIDE 14.77 MICROGRAM(S)/KG/HR: 4 INJECTION INTRAVENOUS at 19:00

## 2019-01-13 RX ADMIN — Medication 65 MILLIGRAM(S): at 09:20

## 2019-01-13 RX ADMIN — Medication 130 MILLIGRAM(S): at 09:46

## 2019-01-13 RX ADMIN — Medication 3 MILLIGRAM(S): at 05:52

## 2019-01-13 RX ADMIN — Medication 130 MILLIGRAM(S): at 23:42

## 2019-01-13 RX ADMIN — MIDAZOLAM HYDROCHLORIDE 2 MILLIGRAM(S): 1 INJECTION, SOLUTION INTRAMUSCULAR; INTRAVENOUS at 11:45

## 2019-01-13 RX ADMIN — Medication 2 MILLIGRAM(S): at 08:37

## 2019-01-13 RX ADMIN — Medication 3 MILLIGRAM(S): at 09:09

## 2019-01-13 RX ADMIN — SODIUM CHLORIDE 75 MILLILITER(S): 9 INJECTION, SOLUTION INTRAVENOUS at 16:15

## 2019-01-13 RX ADMIN — Medication 130 MILLIGRAM(S): at 09:57

## 2019-01-13 RX ADMIN — DEXMEDETOMIDINE HYDROCHLORIDE IN 0.9% SODIUM CHLORIDE 14.77 MICROGRAM(S)/KG/HR: 4 INJECTION INTRAVENOUS at 16:00

## 2019-01-13 RX ADMIN — Medication 130 MILLIGRAM(S): at 18:12

## 2019-01-13 RX ADMIN — DEXMEDETOMIDINE HYDROCHLORIDE IN 0.9% SODIUM CHLORIDE 14.77 MICROGRAM(S)/KG/HR: 4 INJECTION INTRAVENOUS at 22:30

## 2019-01-13 NOTE — CONSULT NOTE ADULT - SUBJECTIVE AND OBJECTIVE BOX
Patient is a 53y old  Male who presents with a chief complaint of n/v (2019 00:23)      HPI:  Pt is a 54 y/o male w/PMH gerd, HLD, etoh abuse presents with complaint of tremors, abdominal pain, nausea, and body aches.  States drinks whiskey regularly cant quantify amount, about 4 days ago started to drink alot more than usual as was w/friends, and states started to get abdominal pains and unable to keep anything down over the last 2 days, last drink 2 days, ago, first reports was just throwing up food material then dark coffee ground like. no melena or brbpr.    pt denies any fever, chills, sob, cp, palpitations, +n/+v/-d/-c no travels or sick cotnacts. (2019 00:23)      Allergies    No Known Allergies    Intolerances        MEDICATIONS  (STANDING):  dexmedetomidine Infusion 0.7 MICROgram(s)/kG/Hr (14.77 mL/Hr) IV Continuous <Continuous>  enoxaparin Injectable 40 milliGRAM(s) SubCutaneous every 24 hours  folic acid 1 milliGRAM(s) Oral daily  influenza   Vaccine 0.5 milliLiter(s) IntraMuscular once  midazolam Injectable 2 milliGRAM(s) IV Push once  midazolam Injectable 2 milliGRAM(s) IV Push once  pantoprazole  Injectable 40 milliGRAM(s) IV Push every 12 hours  PHENobarbital Injectable 130 milliGRAM(s) IV Push every 6 hours  thiamine 100 milliGRAM(s) Oral daily    MEDICATIONS  (PRN):  LORazepam   Injectable 2 milliGRAM(s) IV Push every 1 hour PRN CIWA-Ar score 8 or greater  ondansetron Injectable 4 milliGRAM(s) IV Push every 6 hours PRN Nausea and/or Vomiting      Daily     Daily Weight in k.6 (2019 05:32)    Drug Dosing Weight  Height (cm): 170.18 (2019 17:41)  Weight (kg): 84.4 (2019 17:41)  BMI (kg/m2): 29.1 (2019 17:41)  BSA (m2): 1.96 (2019 17:41)    PAST MEDICAL & SURGICAL HISTORY:  EtOH dependence  Mixed hyperlipidemia  PUD (peptic ulcer disease)  No significant past surgical history      FAMILY HISTORY:  No pertinent family history in first degree relatives (Father, Mother)      SOCIAL HISTORY:    ADVANCE DIRECTIVES:    REVIEW OF SYSTEMS:    CONSTITUTIONAL: No fever, weight loss, or fatigue  EYES: No eye pain, visual disturbances, or discharge  ENMT:  No difficulty hearing, tinnitus, vertigo; No sinus or throat pain  NECK: No pain or stiffness  BREASTS: No pain, masses, or nipple discharge  RESPIRATORY: No cough, wheezing, chills or hemoptysis; No shortness of breath  CARDIOVASCULAR: No chest pain, palpitations, dizziness, or leg swelling  GASTROINTESTINAL: No abdominal or epigastric pain. No nausea, vomiting, or hematemesis; No diarrhea or constipation. No melena or hematochezia.  GENITOURINARY: No dysuria, frequency, hematuria, or incontinence  NEUROLOGICAL: No headaches, memory loss, loss of strength, numbness, or tremors  SKIN: No itching, burning, rashes, or lesions   LYMPH NODES: No enlarged glands  ENDOCRINE: No heat or cold intolerance; No hair loss  MUSCULOSKELETAL: No joint pain or swelling; No muscle, back, or extremity pain  PSYCHIATRIC: No depression, anxiety, mood swings, or difficulty sleeping  HEME/LYMPH: No easy bruising, or bleeding gums  ALLERGY AND IMMUNOLOGIC: No hives or eczema          ICU Vital Signs Last 24 Hrs  T(C): 35.7 (2019 09:02), Max: 36.9 (2019 17:27)  T(F): 96.3 (2019 09:02), Max: 98.4 (2019 17:27)  HR: 96 (2019 10:00) (55 - 96)  BP: 144/102 (2019 10:00) (131/84 - 144/102)  BP(mean): --  ABP: --  ABP(mean): --  RR: 18 (2019 10:00) (17 - 19)  SpO2: 98% (2019 10:00) (96% - 99%)          I&O's Detail    2019 07:01  -  2019 07:00  --------------------------------------------------------  IN:    Oral Fluid: 220 mL  Total IN: 220 mL    OUT:    Voided: 700 mL  Total OUT: 700 mL    Total NET: -480 mL          PHYSICAL EXAM:    GENERAL: NAD, well-groomed, well-developed  HEAD:  Atraumatic, Normocephalic  EYES: EOMI, PERRLA, conjunctiva and sclera clear  ENMT: No tonsillar erythema, exudates, or enlargement; Moist mucous membranes, Good dentition, No lesions  NECK: Supple, No JVD, Normal thyroid  NERVOUS SYSTEM:  Alert & Oriented X3, Good concentration; Motor Strength 5/5 B/L upper and lower extremities; DTRs 2+ intact and symmetric  CHEST/LUNG: Clear to percussion bilaterally; No rales, rhonchi, wheezing, or rubs  HEART: Regular rate and rhythm; No murmurs, rubs, or gallops  ABDOMEN: Soft, Nontender, Nondistended; Bowel sounds present  EXTREMITIES:  2+ Peripheral Pulses, No clubbing, cyanosis, or edema  LYMPH: No lymphadenopathy noted  SKIN: No rashes or lesions    LABS:  CBC Full  -  ( 2019 07:04 )  WBC Count : 3.33 K/uL  Hemoglobin : 13.6 g/dL  Hematocrit : 41.7 %  Platelet Count - Automated : 182 K/uL  Mean Cell Volume : 90.8 fl  Mean Cell Hemoglobin : 29.6 pg  Mean Cell Hemoglobin Concentration : 32.6 gm/dL  Auto Neutrophil # : x  Auto Lymphocyte # : x  Auto Monocyte # : x  Auto Eosinophil # : x  Auto Basophil # : x  Auto Neutrophil % : x  Auto Lymphocyte % : x  Auto Monocyte % : x  Auto Eosinophil % : x  Auto Basophil % : x    -    141  |  109<H>  |  9   ----------------------------<  90  3.9   |  23  |  0.94    Ca    8.8      2019 07:04  Phos  2.6     -  Mg     2.2     -    TPro  7.0  /  Alb  3.6  /  TBili  0.8  /  DBili  .25<H>  /  AST  52<H>  /  ALT  89<H>  /  AlkPhos  35<L>      CAPILLARY BLOOD GLUCOSE      POCT Blood Glucose.: 157 mg/dL (2019 18:00)    PT/INR - ( 2019 20:12 )   PT: 11.2 sec;   INR: 1.00 ratio         PTT - ( 2019 20:12 )  PTT:26.3 sec  Urinalysis Basic - ( 2019 23:08 )    Color: Yellow / Appearance: Clear / S.015 / pH: x  Gluc: x / Ketone: Trace  / Bili: Negative / Urobili: Negative mg/dL   Blood: x / Protein: 100 mg/dL / Nitrite: Negative   Leuk Esterase: Trace / RBC: 0-2 /HPF / WBC 0-2   Sq Epi: x / Non Sq Epi: Few / Bacteria: Few      CARDIAC MARKERS ( 2019 20:12 )  <.015 ng/mL / x     / x     / x     / x              EKG:    ECHO, US:    RADIOLOGY:    CRITICAL CARE TIME SPENT: Patient is a 53y old  Male who presents with a chief complaint of n/v (2019 00:23)      HPI:  Pt is a 54 y/o male w/PMH gerd, HLD, etoh abuse presents with complaint of tremors, abdominal pain, nausea, and body aches.  States drinks whiskey regularly cant quantify amount, about 4 days ago started to drink alot more than usual as was w/friends, and states started to get abdominal pains and unable to keep anything down over the last 2 days, last drink 2 days, ago, first reports was just throwing up food material then dark coffee ground like. no melena or brbpr.    Patient rapid response and code gray for increased agitation. Contacted by PA   patient nonresponsive to Ativan protocol and ordered for Phenobarb x1 remains combative     at RRT: Patient combative requiring multiple security      Allergies: No Known Allergies    Intolerances        MEDICATIONS  (STANDING):  dexmedetomidine Infusion 0.7 MICROgram(s)/kG/Hr (14.77 mL/Hr) IV Continuous <Continuous>  enoxaparin Injectable 40 milliGRAM(s) SubCutaneous every 24 hours  folic acid 1 milliGRAM(s) Oral daily  influenza   Vaccine 0.5 milliLiter(s) IntraMuscular once  midazolam Injectable 2 milliGRAM(s) IV Push once  midazolam Injectable 2 milliGRAM(s) IV Push once  pantoprazole  Injectable 40 milliGRAM(s) IV Push every 12 hours  PHENobarbital Injectable 130 milliGRAM(s) IV Push every 6 hours  thiamine 100 milliGRAM(s) Oral daily    MEDICATIONS  (PRN):  LORazepam   Injectable 2 milliGRAM(s) IV Push every 1 hour PRN CIWA-Ar score 8 or greater  ondansetron Injectable 4 milliGRAM(s) IV Push every 6 hours PRN Nausea and/or Vomiting      Daily     Daily Weight in k.6 (2019 05:32)    Drug Dosing Weight  Height (cm): 170.18 (2019 17:41)  Weight (kg): 84.4 (2019 17:41)  BMI (kg/m2): 29.1 (2019 17:41)  BSA (m2): 1.96 (2019 17:41)    PAST MEDICAL & SURGICAL HISTORY:  EtOH dependence  Mixed hyperlipidemia  PUD (peptic ulcer disease)  No significant past surgical history      FAMILY HISTORY:  No pertinent family history in first degree relatives (Father, Mother)        REVIEW OF SYSTEMS:  as above;      ICU Vital Signs Last 24 Hrs  T(C): 35.7 (2019 09:02), Max: 36.9 (2019 17:27)  T(F): 96.3 (2019 09:02), Max: 98.4 (2019 17:27)  HR: 96 (2019 10:00) (55 - 96)  BP: 144/102 (2019 10:00) (131/84 - 144/102)  BP(mean): --  ABP: --  ABP(mean): --  RR: 18 (2019 10:00) (17 - 19)  SpO2: 98% (2019 10:00) (96% - 99%)        I&O's Detail    2019 07:01  -  2019 07:00  --------------------------------------------------------  IN:    Oral Fluid: 220 mL  Total IN: 220 mL    OUT:    Voided: 700 mL  Total OUT: 700 mL    Total NET: -480 mL          PHYSICAL EXAM:    Patient agitated but able to be verbally directed  Gen: hyperemic  Lungs CTA  Cardiac S1/S2  Abd benign  Neuro alert disoriented    LABS:  CBC Full  -  ( 2019 07:04 )  WBC Count : 3.33 K/uL  Hemoglobin : 13.6 g/dL  Hematocrit : 41.7 %  Platelet Count - Automated : 182 K/uL  Mean Cell Volume : 90.8 fl  Mean Cell Hemoglobin : 29.6 pg  Mean Cell Hemoglobin Concentration : 32.6 gm/dL  Auto Neutrophil # : x  Auto Lymphocyte # : x  Auto Monocyte # : x  Auto Eosinophil # : x  Auto Basophil # : x  Auto Neutrophil % : x  Auto Lymphocyte % : x  Auto Monocyte % : x  Auto Eosinophil % : x  Auto Basophil % : x        141  |  109<H>  |  9   ----------------------------<  90  3.9   |  23  |  0.94    Ca    8.8      2019 07:04  Phos  2.6     -  Mg     2.2     13    TPro  7.0  /  Alb  3.6  /  TBili  0.8  /  DBili  .25<H>  /  AST  52<H>  /  ALT  89<H>  /  AlkPhos  35<L>  12    CAPILLARY BLOOD GLUCOSE      POCT Blood Glucose.: 157 mg/dL (2019 18:00)    PT/INR - ( 2019 20:12 )   PT: 11.2 sec;   INR: 1.00 ratio         PTT - ( 2019 20:12 )  PTT:26.3 sec  Urinalysis Basic - ( 2019 23:08 )    Color: Yellow / Appearance: Clear / S.015 / pH: x  Gluc: x / Ketone: Trace  / Bili: Negative / Urobili: Negative mg/dL   Blood: x / Protein: 100 mg/dL / Nitrite: Negative   Leuk Esterase: Trace / RBC: 0-2 /HPF / WBC 0-2   Sq Epi: x / Non Sq Epi: Few / Bacteria: Few      CARDIAC MARKERS ( 2019 20:12 )  <.015 ng/mL / x     / x     / x     / x                Patient combative requiring multiple security Patient is a 53y old  Male who presents with a chief complaint of n/v (2019 00:23)      HPI:  Pt is a 54 y/o male w/PMH gerd, HLD, etoh abuse presents with complaint of tremors, abdominal pain, nausea, and body aches.  States drinks whiskey regularly cant quantify amount, about 4 days ago started to drink alot more than usual as was w/friends, and states started to get abdominal pains and unable to keep anything down over the last 2 days, last drink 2 days, ago, first reports was just throwing up food material then dark coffee ground like. no melena or brbpr.    Patient rapid response and code gray for increased agitation. Contacted by PA   patient nonresponsive to Ativan protocol and ordered for Phenobarb x1 remains combative     at RRT: Patient combative requiring multiple security      Allergies: No Known Allergies    Intolerances        MEDICATIONS  (STANDING):  dexmedetomidine Infusion 0.7 MICROgram(s)/kG/Hr (14.77 mL/Hr) IV Continuous <Continuous>  enoxaparin Injectable 40 milliGRAM(s) SubCutaneous every 24 hours  folic acid 1 milliGRAM(s) Oral daily  influenza   Vaccine 0.5 milliLiter(s) IntraMuscular once  midazolam Injectable 2 milliGRAM(s) IV Push once  midazolam Injectable 2 milliGRAM(s) IV Push once  pantoprazole  Injectable 40 milliGRAM(s) IV Push every 12 hours  PHENobarbital Injectable 130 milliGRAM(s) IV Push every 6 hours  thiamine 100 milliGRAM(s) Oral daily    MEDICATIONS  (PRN):  LORazepam   Injectable 2 milliGRAM(s) IV Push every 1 hour PRN CIWA-Ar score 8 or greater  ondansetron Injectable 4 milliGRAM(s) IV Push every 6 hours PRN Nausea and/or Vomiting      Daily     Daily Weight in k.6 (2019 05:32)    Drug Dosing Weight  Height (cm): 170.18 (2019 17:41)  Weight (kg): 84.4 (2019 17:41)  BMI (kg/m2): 29.1 (2019 17:41)  BSA (m2): 1.96 (2019 17:41)    PAST MEDICAL & SURGICAL HISTORY:  EtOH dependence  Mixed hyperlipidemia  PUD (peptic ulcer disease)  No significant past surgical history      FAMILY HISTORY:  No pertinent family history in first degree relatives (Father, Mother)        REVIEW OF SYSTEMS:  as above;      ICU Vital Signs Last 24 Hrs  T(C): 35.7 (2019 09:02), Max: 36.9 (2019 17:27)  T(F): 96.3 (2019 09:02), Max: 98.4 (2019 17:27)  HR: 96 (2019 10:00) (55 - 96)  BP: 144/102 (2019 10:00) (131/84 - 144/102)  BP(mean): --  ABP: --  ABP(mean): --  RR: 18 (2019 10:00) (17 - 19)  SpO2: 98% (2019 10:00) (96% - 99%)        I&O's Detail    2019 07:01  -  2019 07:00  --------------------------------------------------------  IN:    Oral Fluid: 220 mL  Total IN: 220 mL    OUT:    Voided: 700 mL  Total OUT: 700 mL    Total NET: -480 mL          PHYSICAL EXAM:    Patient agitated but able to be verbally directed  Gen: hyperemic  Lungs CTA  Cardiac S1/S2  Abd benign  Neuro alert disoriented    LABS:  CBC Full  -  ( 2019 07:04 )  WBC Count : 3.33 K/uL  Hemoglobin : 13.6 g/dL  Hematocrit : 41.7 %  Platelet Count - Automated : 182 K/uL  Mean Cell Volume : 90.8 fl  Mean Cell Hemoglobin : 29.6 pg  Mean Cell Hemoglobin Concentration : 32.6 gm/dL  Auto Neutrophil # : x  Auto Lymphocyte # : x  Auto Monocyte # : x  Auto Eosinophil # : x  Auto Basophil # : x  Auto Neutrophil % : x  Auto Lymphocyte % : x  Auto Monocyte % : x  Auto Eosinophil % : x  Auto Basophil % : x        141  |  109<H>  |  9   ----------------------------<  90  3.9   |  23  |  0.94    Ca    8.8      2019 07:04  Phos  2.6     -  Mg     2.2     13    TPro  7.0  /  Alb  3.6  /  TBili  0.8  /  DBili  .25<H>  /  AST  52<H>  /  ALT  89<H>  /  AlkPhos  35<L>      CAPILLARY BLOOD GLUCOSE  POCT Blood Glucose.: 157 mg/dL (2019 18:00)      PT/INR - ( 2019 20:12 )   PT: 11.2 sec;   INR: 1.00 ratio    PTT - ( 2019 20:12 )  PTT:26.3 sec    Lipase, Serum (19 @ 20:12)    Lipase, Serum: 248 U/L      Urinalysis Basic - ( 2019 23:08 )    Color: Yellow / Appearance: Clear / S.015 / pH: x  Gluc: x / Ketone: Trace  / Bili: Negative / Urobili: Negative mg/dL   Blood: x / Protein: 100 mg/dL / Nitrite: Negative   Leuk Esterase: Trace / RBC: 0-2 /HPF / WBC 0-2   Sq Epi: x / Non Sq Epi: Few / Bacteria: Few      CARDIAC MARKERS ( 2019 20:12 )  <.015 ng/mL trop      CXR < from: Xray Chest 1 View AP/PA. (19 @ 19:10) >  No focal consolidation or pleural effusion.

## 2019-01-13 NOTE — CONSULT NOTE ADULT - ASSESSMENT
54 y/o male admission for 53 year old man with PMH GERD, HLD, etoh abuse presented with complaint of tremors, abdominal pain, nausea, and body aches.  ETOH abuse with withdrawal, DTs now combative s/p multiple code gray and rapid responses.  Will transfer to ICU for continued care    -Continue CIWA protocol with additional doses of ativan PRN  -Continue 1:1 for safety  -Elevate HOB  -start  Precedex drip and continue phenobarb   -Daily Thiamine, MVI and Folate   -NPO until MS improves   -Repeat labs, replace electrolytes as needed 53 year old man with PMH GERD, HLD, etoh abuse presented with complaint of tremors, abdominal pain, nausea, and body aches.  ETOH abuse with withdrawal, DTs now combative s/p multiple code gray and rapid responses.  Will transfer to ICU for continued care    -Continue CIWA protocol with additional doses of ativan PRN  -Continue 1:1 for safety  -Elevate HOB  -start  Precedex drip and continue phenobarb   -Daily Thiamine, MVI and Folate   -NPO until MS improves   -Repeat labs, replace electrolytes as needed

## 2019-01-13 NOTE — CONSULT NOTE ADULT - ATTENDING COMMENTS
Pt seen and examined with NP during RRT and on arrival to ICU on 1/13.    53M PMH alcohol abuse, alcohol withdrawal/DTs, hx of aspiration PNA with hypoxic respiratory failure requiring intubation, hyperlipidemia, GERD, gastritis, esophagitis presents on 1/11 for abdominal pain, nausea, emesis and tremors s/p heavier drinking than his normal daily intake in past few days. Last drink was on 1/10. Admitted to medical floor alcohol withdrawal and gastritis. He was placed on ativan protocol. Multiple code greys called for patient for agitation, placed on 1:1 constant observation. Today with multiple code greys and rapid response called for severe agitation. CIWA 23.     DX: delirium tremens/alcohol withdrawal, chronic alcohol abuse, gastritis.     - Pt constantly trying to climb out of bed attempting to leave room, speaking and shouting in [place], pulling off clothes and cardiac leads, disoriented, with visible bilateral hand tremors.   - pt was on ativan 4mg IVP q6 hr dosing on 1/12 which was tapered to 3mg IVP q6 hr overnight, agitation worsened this morning. received a total of ativan 27mg IVP on 1/12  - during RRT pt given phenobarbital 65mg IVP x1 and 130mg IVP x1 with mild improvement in agitation  - transferred to ICU for DTs  - in ICU pt agitated, tremorous, shouting, climbing out of bed: versed 2mg IVP x1 given, phenobarbital 130mg IVP x1 given and started on a precedex drip  - cont around the clock phenobarbital with prn phenobarbital for breakthrough agitation  - precedex gtt  - monitor end tidal  - thiamine, folic acid, MVI  - IVF hydration  - initiate oral diet as tolerates  - protonix for likely alcohol induced gastritis, no hematemesis or coffee ground emesis  - cont 1:1 observation for safety  - supplement electrolytes as needed    Critical Care Time: 60 min

## 2019-01-14 LAB
ANION GAP SERPL CALC-SCNC: 8 MMOL/L — SIGNIFICANT CHANGE UP (ref 5–17)
BUN SERPL-MCNC: 9 MG/DL — SIGNIFICANT CHANGE UP (ref 7–23)
CALCIUM SERPL-MCNC: 8.5 MG/DL — SIGNIFICANT CHANGE UP (ref 8.5–10.1)
CHLORIDE SERPL-SCNC: 108 MMOL/L — SIGNIFICANT CHANGE UP (ref 96–108)
CO2 SERPL-SCNC: 22 MMOL/L — SIGNIFICANT CHANGE UP (ref 22–31)
CREAT SERPL-MCNC: 0.77 MG/DL — SIGNIFICANT CHANGE UP (ref 0.5–1.3)
GLUCOSE SERPL-MCNC: 108 MG/DL — HIGH (ref 70–99)
HCT VFR BLD CALC: 43.4 % — SIGNIFICANT CHANGE UP (ref 39–50)
HGB BLD-MCNC: 14.1 G/DL — SIGNIFICANT CHANGE UP (ref 13–17)
MAGNESIUM SERPL-MCNC: 2 MG/DL — SIGNIFICANT CHANGE UP (ref 1.6–2.6)
MCHC RBC-ENTMCNC: 29.1 PG — SIGNIFICANT CHANGE UP (ref 27–34)
MCHC RBC-ENTMCNC: 32.5 GM/DL — SIGNIFICANT CHANGE UP (ref 32–36)
MCV RBC AUTO: 89.5 FL — SIGNIFICANT CHANGE UP (ref 80–100)
NRBC # BLD: 0 /100 WBCS — SIGNIFICANT CHANGE UP (ref 0–0)
PHOSPHATE SERPL-MCNC: 3 MG/DL — SIGNIFICANT CHANGE UP (ref 2.5–4.5)
PLATELET # BLD AUTO: 177 K/UL — SIGNIFICANT CHANGE UP (ref 150–400)
POTASSIUM SERPL-MCNC: 3.5 MMOL/L — SIGNIFICANT CHANGE UP (ref 3.5–5.3)
POTASSIUM SERPL-SCNC: 3.5 MMOL/L — SIGNIFICANT CHANGE UP (ref 3.5–5.3)
RBC # BLD: 4.85 M/UL — SIGNIFICANT CHANGE UP (ref 4.2–5.8)
RBC # FLD: 12.7 % — SIGNIFICANT CHANGE UP (ref 10.3–14.5)
SODIUM SERPL-SCNC: 138 MMOL/L — SIGNIFICANT CHANGE UP (ref 135–145)
WBC # BLD: 6.54 K/UL — SIGNIFICANT CHANGE UP (ref 3.8–10.5)
WBC # FLD AUTO: 6.54 K/UL — SIGNIFICANT CHANGE UP (ref 3.8–10.5)

## 2019-01-14 PROCEDURE — 99291 CRITICAL CARE FIRST HOUR: CPT

## 2019-01-14 RX ORDER — THIAMINE MONONITRATE (VIT B1) 100 MG
100 TABLET ORAL DAILY
Qty: 0 | Refills: 0 | Status: DISCONTINUED | OUTPATIENT
Start: 2019-01-14 | End: 2019-01-27

## 2019-01-14 RX ORDER — PHENOBARBITAL 60 MG
260 TABLET ORAL
Qty: 0 | Refills: 0 | Status: DISCONTINUED | OUTPATIENT
Start: 2019-01-14 | End: 2019-01-20

## 2019-01-14 RX ORDER — PHENOBARBITAL 60 MG
130 TABLET ORAL
Qty: 0 | Refills: 0 | Status: DISCONTINUED | OUTPATIENT
Start: 2019-01-14 | End: 2019-01-20

## 2019-01-14 RX ORDER — FOLIC ACID 0.8 MG
1 TABLET ORAL DAILY
Qty: 0 | Refills: 0 | Status: DISCONTINUED | OUTPATIENT
Start: 2019-01-14 | End: 2019-01-27

## 2019-01-14 RX ORDER — PHENOBARBITAL 60 MG
260 TABLET ORAL EVERY 6 HOURS
Qty: 0 | Refills: 0 | Status: DISCONTINUED | OUTPATIENT
Start: 2019-01-14 | End: 2019-01-20

## 2019-01-14 RX ADMIN — Medication 2 MILLIGRAM(S): at 07:53

## 2019-01-14 RX ADMIN — Medication 130 MILLIGRAM(S): at 14:38

## 2019-01-14 RX ADMIN — SODIUM CHLORIDE 75 MILLILITER(S): 9 INJECTION, SOLUTION INTRAVENOUS at 02:25

## 2019-01-14 RX ADMIN — DEXMEDETOMIDINE HYDROCHLORIDE IN 0.9% SODIUM CHLORIDE 14.77 MICROGRAM(S)/KG/HR: 4 INJECTION INTRAVENOUS at 19:26

## 2019-01-14 RX ADMIN — SODIUM CHLORIDE 75 MILLILITER(S): 9 INJECTION, SOLUTION INTRAVENOUS at 17:13

## 2019-01-14 RX ADMIN — DEXMEDETOMIDINE HYDROCHLORIDE IN 0.9% SODIUM CHLORIDE 14.77 MICROGRAM(S)/KG/HR: 4 INJECTION INTRAVENOUS at 02:28

## 2019-01-14 RX ADMIN — DEXMEDETOMIDINE HYDROCHLORIDE IN 0.9% SODIUM CHLORIDE 14.77 MICROGRAM(S)/KG/HR: 4 INJECTION INTRAVENOUS at 06:04

## 2019-01-14 RX ADMIN — Medication 260 MILLIGRAM(S): at 12:03

## 2019-01-14 RX ADMIN — Medication 1 MILLIGRAM(S): at 12:01

## 2019-01-14 RX ADMIN — Medication 2 MILLIGRAM(S): at 02:16

## 2019-01-14 RX ADMIN — Medication 130 MILLIGRAM(S): at 10:37

## 2019-01-14 RX ADMIN — Medication 100 MILLIGRAM(S): at 12:00

## 2019-01-14 RX ADMIN — DEXMEDETOMIDINE HYDROCHLORIDE IN 0.9% SODIUM CHLORIDE 14.77 MICROGRAM(S)/KG/HR: 4 INJECTION INTRAVENOUS at 12:30

## 2019-01-14 RX ADMIN — Medication 130 MILLIGRAM(S): at 06:03

## 2019-01-14 RX ADMIN — DEXMEDETOMIDINE HYDROCHLORIDE IN 0.9% SODIUM CHLORIDE 14.77 MICROGRAM(S)/KG/HR: 4 INJECTION INTRAVENOUS at 09:34

## 2019-01-14 RX ADMIN — ENOXAPARIN SODIUM 40 MILLIGRAM(S): 100 INJECTION SUBCUTANEOUS at 17:13

## 2019-01-14 RX ADMIN — DEXMEDETOMIDINE HYDROCHLORIDE IN 0.9% SODIUM CHLORIDE 14.77 MICROGRAM(S)/KG/HR: 4 INJECTION INTRAVENOUS at 16:08

## 2019-01-14 RX ADMIN — PANTOPRAZOLE SODIUM 40 MILLIGRAM(S): 20 TABLET, DELAYED RELEASE ORAL at 17:12

## 2019-01-14 RX ADMIN — DEXMEDETOMIDINE HYDROCHLORIDE IN 0.9% SODIUM CHLORIDE 14.77 MICROGRAM(S)/KG/HR: 4 INJECTION INTRAVENOUS at 21:45

## 2019-01-14 RX ADMIN — PANTOPRAZOLE SODIUM 40 MILLIGRAM(S): 20 TABLET, DELAYED RELEASE ORAL at 06:03
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
## 2019-01-15 LAB
ANION GAP SERPL CALC-SCNC: 7 MMOL/L — SIGNIFICANT CHANGE UP (ref 5–17)
BUN SERPL-MCNC: 11 MG/DL — SIGNIFICANT CHANGE UP (ref 7–23)
CALCIUM SERPL-MCNC: 8.7 MG/DL — SIGNIFICANT CHANGE UP (ref 8.5–10.1)
CHLORIDE SERPL-SCNC: 108 MMOL/L — SIGNIFICANT CHANGE UP (ref 96–108)
CO2 SERPL-SCNC: 24 MMOL/L — SIGNIFICANT CHANGE UP (ref 22–31)
CREAT SERPL-MCNC: 0.69 MG/DL — SIGNIFICANT CHANGE UP (ref 0.5–1.3)
GLUCOSE SERPL-MCNC: 91 MG/DL — SIGNIFICANT CHANGE UP (ref 70–99)
HCT VFR BLD CALC: 42.9 % — SIGNIFICANT CHANGE UP (ref 39–50)
HGB BLD-MCNC: 13.9 G/DL — SIGNIFICANT CHANGE UP (ref 13–17)
MAGNESIUM SERPL-MCNC: 1.8 MG/DL — SIGNIFICANT CHANGE UP (ref 1.6–2.6)
MCHC RBC-ENTMCNC: 29.2 PG — SIGNIFICANT CHANGE UP (ref 27–34)
MCHC RBC-ENTMCNC: 32.4 GM/DL — SIGNIFICANT CHANGE UP (ref 32–36)
MCV RBC AUTO: 90.1 FL — SIGNIFICANT CHANGE UP (ref 80–100)
NRBC # BLD: 0 /100 WBCS — SIGNIFICANT CHANGE UP (ref 0–0)
PHENOBARB SERPL-MCNC: 43.8 UG/ML — HIGH (ref 15–40)
PHOSPHATE SERPL-MCNC: 2.9 MG/DL — SIGNIFICANT CHANGE UP (ref 2.5–4.5)
PLATELET # BLD AUTO: 181 K/UL — SIGNIFICANT CHANGE UP (ref 150–400)
POTASSIUM SERPL-MCNC: 3.2 MMOL/L — LOW (ref 3.5–5.3)
POTASSIUM SERPL-SCNC: 3.2 MMOL/L — LOW (ref 3.5–5.3)
RBC # BLD: 4.76 M/UL — SIGNIFICANT CHANGE UP (ref 4.2–5.8)
RBC # FLD: 12.6 % — SIGNIFICANT CHANGE UP (ref 10.3–14.5)
SODIUM SERPL-SCNC: 139 MMOL/L — SIGNIFICANT CHANGE UP (ref 135–145)
WBC # BLD: 6.63 K/UL — SIGNIFICANT CHANGE UP (ref 3.8–10.5)
WBC # FLD AUTO: 6.63 K/UL — SIGNIFICANT CHANGE UP (ref 3.8–10.5)

## 2019-01-15 PROCEDURE — 99291 CRITICAL CARE FIRST HOUR: CPT

## 2019-01-15 RX ORDER — POTASSIUM CHLORIDE 20 MEQ
10 PACKET (EA) ORAL
Qty: 0 | Refills: 0 | Status: COMPLETED | OUTPATIENT
Start: 2019-01-15 | End: 2019-01-15

## 2019-01-15 RX ADMIN — Medication 100 MILLIEQUIVALENT(S): at 08:33

## 2019-01-15 RX ADMIN — Medication 100 MILLIEQUIVALENT(S): at 07:31

## 2019-01-15 RX ADMIN — DEXMEDETOMIDINE HYDROCHLORIDE IN 0.9% SODIUM CHLORIDE 14.77 MICROGRAM(S)/KG/HR: 4 INJECTION INTRAVENOUS at 13:20

## 2019-01-15 RX ADMIN — DEXMEDETOMIDINE HYDROCHLORIDE IN 0.9% SODIUM CHLORIDE 14.77 MICROGRAM(S)/KG/HR: 4 INJECTION INTRAVENOUS at 11:27

## 2019-01-15 RX ADMIN — Medication 260 MILLIGRAM(S): at 23:47

## 2019-01-15 RX ADMIN — PANTOPRAZOLE SODIUM 40 MILLIGRAM(S): 20 TABLET, DELAYED RELEASE ORAL at 05:03

## 2019-01-15 RX ADMIN — Medication 100 MILLIEQUIVALENT(S): at 09:36

## 2019-01-15 RX ADMIN — DEXMEDETOMIDINE HYDROCHLORIDE IN 0.9% SODIUM CHLORIDE 14.77 MICROGRAM(S)/KG/HR: 4 INJECTION INTRAVENOUS at 07:31

## 2019-01-15 RX ADMIN — Medication 260 MILLIGRAM(S): at 05:03

## 2019-01-15 RX ADMIN — Medication 260 MILLIGRAM(S): at 01:01

## 2019-01-15 RX ADMIN — DEXMEDETOMIDINE HYDROCHLORIDE IN 0.9% SODIUM CHLORIDE 14.77 MICROGRAM(S)/KG/HR: 4 INJECTION INTRAVENOUS at 03:00

## 2019-01-15 RX ADMIN — SODIUM CHLORIDE 75 MILLILITER(S): 9 INJECTION, SOLUTION INTRAVENOUS at 07:37

## 2019-01-15 RX ADMIN — DEXMEDETOMIDINE HYDROCHLORIDE IN 0.9% SODIUM CHLORIDE 14.77 MICROGRAM(S)/KG/HR: 4 INJECTION INTRAVENOUS at 22:38

## 2019-01-15 RX ADMIN — Medication 260 MILLIGRAM(S): at 12:56

## 2019-01-15 RX ADMIN — PANTOPRAZOLE SODIUM 40 MILLIGRAM(S): 20 TABLET, DELAYED RELEASE ORAL at 17:45

## 2019-01-15 RX ADMIN — ENOXAPARIN SODIUM 40 MILLIGRAM(S): 100 INJECTION SUBCUTANEOUS at 17:45

## 2019-01-15 RX ADMIN — DEXMEDETOMIDINE HYDROCHLORIDE IN 0.9% SODIUM CHLORIDE 14.77 MICROGRAM(S)/KG/HR: 4 INJECTION INTRAVENOUS at 09:17

## 2019-01-15 RX ADMIN — DEXMEDETOMIDINE HYDROCHLORIDE IN 0.9% SODIUM CHLORIDE 14.77 MICROGRAM(S)/KG/HR: 4 INJECTION INTRAVENOUS at 17:50

## 2019-01-15 RX ADMIN — Medication 1 MILLIGRAM(S): at 12:59

## 2019-01-15 RX ADMIN — Medication 100 MILLIGRAM(S): at 12:24

## 2019-01-15 RX ADMIN — Medication 260 MILLIGRAM(S): at 06:41

## 2019-01-15 NOTE — PROGRESS NOTE ADULT - SUBJECTIVE AND OBJECTIVE BOX
# CC: Nausea / vomiting    ## HPI:  53M PMH etOH dependence p/w N/V +body aches for past two days a/w inability to eat or drink. Vomitus was initially food particles, then progressed to dark coffee-ground in appearance.  In ED, /110  spO2 96% [RA] afebrile. Initially admitted to general medicine for gastritis, possible etOH withdrawal.  1/13: RRT called for multiple episodes of agitation and undertreatment for withdrawal. Patient transferred to ICU for closer monitoring.    **O/N:**  Intermittent episodes of somnolence and agitation. Given 1 PRN dose phenobarb 260mg IV overnight    ## ROS:  Unobtainable due to sedation, patient mumbling but not coherently    ## Labs:  CBC:                        13.9   6.63  )-----------( 181      ( 15 Brad 2019 03:56 )             42.9       Chem:  01-15    139  |  108  |  11  ----------------------------<  91  3.2<L>   |  24  |  0.69    Ca    8.7      15 Brad 2019 03:56  Phos  2.9     01-15  Mg     1.8     01-15    ## Imaging: No new imaging    ## Medications:  dexmedetomidine Infusion 0.7 MICROgram(s)/kG/Hr IV Continuous <Continuous>  ondansetron Injectable 4 milliGRAM(s) IV Push every 6 hours PRN  PHENobarbital Injectable 260 milliGRAM(s) IV Push every 6 hours  PHENobarbital Injectable 130 milliGRAM(s) IV Push every 15 minutes PRN  PHENobarbital Injectable 260 milliGRAM(s) IV Push every 15 minutes PRN    enoxaparin Injectable 40 milliGRAM(s) SubCutaneous every 24 hours  pantoprazole  Injectable 40 milliGRAM(s) IV Push every 12 hours    ## O/E:  T(F): 98.2 (01-15-19 @ 20:37), Max: 98.8 (01-15-19 @ 15:25)  HR: 72 (01-15-19 @ 20:00) (63 - 91)  BP: 110/79 (01-15-19 @ 20:00) (93/66 - 161/99)  RR: 17 (01-15-19 @ 20:00) (16 - 28)  SpO2: 98% (01-15-19 @ 20:00) (94% - 100%)  IN: 2214 mL / OUT: ?? mL / NET: 2214 mL    Gen: lying comfortably in bed sedated with occasional cough and stertor  HEENT: PERRL  Resp: scattered rhonchi that improve with cough  CVS: S1S2 no m/r/g  Abd: soft NT/ND +BS  Ext: no c/c/e  Neuro: sedated but arouses to voice    ## Daily Issues  - Analgesia: N/A  - Sedation: Precedex  - HOB elevation: Y  - GI ppx (ulcers): N/A  - DVT ppx: Lovenox  - Nutrition: PO diet  - Central line: N  - Smith: N    ## Assessment / Plan:  53M PMH etOH dependence now with withdrawal  - Episodes of sedation followed by intermittent episodes of agitation / danger to staff    * Precedex gtt, will attempt to titrate down and determine how many PRN phenobarb doses are required    * c/w Phenobarb 260mg IV Q6h standing    * Phenobarb 130/260 PRN, has received 2 doses    * Avoid concomitant benzos whenever possible    * 1:1 for safety    * empiric folate / thiamine repletion, but MCV shows no evidence of macrocytosis (B12 / folate deficiency)  - Patient will remain in ICU while on Precedex gtt    ## Code status:  Goals of care discussion: [x] yes [ ] no  [x] full code  [ ] DNR  [ ] DNI  [ ] SYEDA # CC: Nausea / vomiting    ## HPI:  53M PMH etOH dependence p/w N/V +body aches for past two days a/w inability to eat or drink. Vomitus was initially food particles, then progressed to dark coffee-ground in appearance.  In ED, /110  spO2 96% [RA] afebrile. Initially admitted to general medicine for gastritis, possible etOH withdrawal.  1/13: RRT called for multiple episodes of agitation and undertreatment for withdrawal. Patient transferred to ICU for closer monitoring.    **O/N:**  Intermittent episodes of somnolence and agitation. Given 1 PRN dose phenobarb 260mg IV overnight    ## ROS:  Unobtainable due to sedation, patient mumbling but not coherently    ## Labs:  CBC:                        13.9   6.63  )-----------( 181      ( 15 Brad 2019 03:56 )             42.9       Chem:  01-15    139  |  108  |  11  ----------------------------<  91  3.2<L>   |  24  |  0.69    Ca    8.7      15 Brad 2019 03:56  Phos  2.9     01-15  Mg     1.8     01-15    ## Imaging: No new imaging    ## Medications:  dexmedetomidine Infusion 0.7 MICROgram(s)/kG/Hr IV Continuous <Continuous>  ondansetron Injectable 4 milliGRAM(s) IV Push every 6 hours PRN  PHENobarbital Injectable 260 milliGRAM(s) IV Push every 6 hours  PHENobarbital Injectable 130 milliGRAM(s) IV Push every 15 minutes PRN  PHENobarbital Injectable 260 milliGRAM(s) IV Push every 15 minutes PRN    enoxaparin Injectable 40 milliGRAM(s) SubCutaneous every 24 hours  pantoprazole  Injectable 40 milliGRAM(s) IV Push every 12 hours    ## O/E:  T(F): 98.2 (01-15-19 @ 20:37), Max: 98.8 (01-15-19 @ 15:25)  HR: 72 (01-15-19 @ 20:00) (63 - 91)  BP: 110/79 (01-15-19 @ 20:00) (93/66 - 161/99)  RR: 17 (01-15-19 @ 20:00) (16 - 28)  SpO2: 98% (01-15-19 @ 20:00) (94% - 100%)  IN: 2214 mL / OUT: ?? mL / NET: 2214 mL    Gen: lying comfortably in bed sedated with occasional cough and stertor  HEENT: PERRL  Resp: scattered rhonchi that improve with cough  CVS: S1S2 no m/r/g  Abd: soft NT/ND +BS  Ext: no c/c/e  Neuro: sedated but arouses to voice    ## Daily Issues  - Analgesia: N/A  - Sedation: Precedex  - HOB elevation: Y  - GI ppx (ulcers): N/A  - DVT ppx: Lovenox  - Nutrition: PO diet  - Central line: N  - Smith: N    ## Assessment / Plan:  53M PMH etOH dependence now with withdrawal  - Episodes of sedation followed by intermittent episodes of agitation / danger to staff    * Precedex gtt, will attempt to titrate down and determine how many PRN phenobarb doses are required    * c/w Phenobarb 260mg IV Q6h standing    * Phenobarb 130/260 PRN, has received 2 doses    * Avoid concomitant benzos whenever possible    * 1:1 for safety    * empiric folate / thiamine repletion, but MCV shows no evidence of macrocytosis (B12 / folate deficiency)    * episode of desaturation with improvement with suctioning. Woke up / agitated afterwards. Continue to monitor airway closely and watch for fever or new infiltrate. Aggressive chest PT and pulmonary toilet as tolerated.  - Patient will remain in ICU while on Precedex gtt    ## Code status:  Goals of care discussion: [x] yes [ ] no  [x] full code  [ ] DNR  [ ] DNI  [ ] MOLST

## 2019-01-15 NOTE — DIETITIAN INITIAL EVALUATION ADULT. - OTHER INFO
Pt seen today for CCU admit. Per chart review pt lives at home with spouse and children; unable to reach spouse with phone number provided in chart. RD observed pt sedated. Spoke with RN. RN state pt NPO until sedation is lessened. Per chart review pt combative and agitated with ETOH withdraws.

## 2019-01-15 NOTE — DIETITIAN INITIAL EVALUATION ADULT. - PERTINENT MEDS FT
MEDICATIONS  (STANDING):  dexmedetomidine Infusion 0.7 MICROgram(s)/kG/Hr (14.77 mL/Hr) IV Continuous <Continuous>  enoxaparin Injectable 40 milliGRAM(s) SubCutaneous every 24 hours  folic acid Injectable 1 milliGRAM(s) IV Push daily  influenza   Vaccine 0.5 milliLiter(s) IntraMuscular once  lactated ringers. 1000 milliLiter(s) (75 mL/Hr) IV Continuous <Continuous>  pantoprazole  Injectable 40 milliGRAM(s) IV Push every 12 hours  PHENobarbital Injectable 260 milliGRAM(s) IV Push every 6 hours  thiamine Injectable 100 milliGRAM(s) IV Push daily    MEDICATIONS  (PRN):  ondansetron Injectable 4 milliGRAM(s) IV Push every 6 hours PRN Nausea and/or Vomiting  PHENobarbital Injectable 130 milliGRAM(s) IV Push every 15 minutes PRN ciwa >8  PHENobarbital Injectable 260 milliGRAM(s) IV Push every 15 minutes PRN ciwa >20

## 2019-01-15 NOTE — DIETITIAN INITIAL EVALUATION ADULT. - ENERGY NEEDS
Height (cm): 170.18 (01-13)  Weight (kg): 77 (01-13)  BMI (kg/m2): 26.6 (01-13)  IBW: 67.1kg   % IBW: 133%

## 2019-01-15 NOTE — DIETITIAN INITIAL EVALUATION ADULT. - PERTINENT LABORATORY DATA
01-15 Na 139 mmol/L Glu 91 mg/dL K+ 3.2 mmol/L<L> Cr  0.69 mg/dL BUN 11 mg/dL Phos 2.9 mg/dL Alb n/a   PAB n/a   Hgb 13.9 g/dL Hct 42.9 %

## 2019-01-16 LAB
ANION GAP SERPL CALC-SCNC: 10 MMOL/L — SIGNIFICANT CHANGE UP (ref 5–17)
BUN SERPL-MCNC: 9 MG/DL — SIGNIFICANT CHANGE UP (ref 7–23)
CALCIUM SERPL-MCNC: 8.4 MG/DL — LOW (ref 8.5–10.1)
CHLORIDE SERPL-SCNC: 107 MMOL/L — SIGNIFICANT CHANGE UP (ref 96–108)
CO2 SERPL-SCNC: 21 MMOL/L — LOW (ref 22–31)
CREAT SERPL-MCNC: 0.68 MG/DL — SIGNIFICANT CHANGE UP (ref 0.5–1.3)
GLUCOSE SERPL-MCNC: 96 MG/DL — SIGNIFICANT CHANGE UP (ref 70–99)
HCT VFR BLD CALC: 41.9 % — SIGNIFICANT CHANGE UP (ref 39–50)
HGB BLD-MCNC: 13.4 G/DL — SIGNIFICANT CHANGE UP (ref 13–17)
MAGNESIUM SERPL-MCNC: 2.1 MG/DL — SIGNIFICANT CHANGE UP (ref 1.6–2.6)
MCHC RBC-ENTMCNC: 28.9 PG — SIGNIFICANT CHANGE UP (ref 27–34)
MCHC RBC-ENTMCNC: 32 GM/DL — SIGNIFICANT CHANGE UP (ref 32–36)
MCV RBC AUTO: 90.3 FL — SIGNIFICANT CHANGE UP (ref 80–100)
NRBC # BLD: 0 /100 WBCS — SIGNIFICANT CHANGE UP (ref 0–0)
PHOSPHATE SERPL-MCNC: 2.4 MG/DL — LOW (ref 2.5–4.5)
PLATELET # BLD AUTO: 196 K/UL — SIGNIFICANT CHANGE UP (ref 150–400)
POTASSIUM SERPL-MCNC: 3.3 MMOL/L — LOW (ref 3.5–5.3)
POTASSIUM SERPL-SCNC: 3.3 MMOL/L — LOW (ref 3.5–5.3)
RBC # BLD: 4.64 M/UL — SIGNIFICANT CHANGE UP (ref 4.2–5.8)
RBC # FLD: 12.5 % — SIGNIFICANT CHANGE UP (ref 10.3–14.5)
SODIUM SERPL-SCNC: 138 MMOL/L — SIGNIFICANT CHANGE UP (ref 135–145)
WBC # BLD: 7.54 K/UL — SIGNIFICANT CHANGE UP (ref 3.8–10.5)
WBC # FLD AUTO: 7.54 K/UL — SIGNIFICANT CHANGE UP (ref 3.8–10.5)

## 2019-01-16 PROCEDURE — 99291 CRITICAL CARE FIRST HOUR: CPT

## 2019-01-16 RX ORDER — PHENOBARBITAL 60 MG
260 TABLET ORAL ONCE
Qty: 0 | Refills: 0 | Status: DISCONTINUED | OUTPATIENT
Start: 2019-01-16 | End: 2019-01-16

## 2019-01-16 RX ORDER — POTASSIUM PHOSPHATE, MONOBASIC POTASSIUM PHOSPHATE, DIBASIC 236; 224 MG/ML; MG/ML
15 INJECTION, SOLUTION INTRAVENOUS ONCE
Qty: 0 | Refills: 0 | Status: COMPLETED | OUTPATIENT
Start: 2019-01-16 | End: 2019-01-16

## 2019-01-16 RX ORDER — POTASSIUM CHLORIDE 20 MEQ
40 PACKET (EA) ORAL ONCE
Qty: 0 | Refills: 0 | Status: DISCONTINUED | OUTPATIENT
Start: 2019-01-16 | End: 2019-01-16

## 2019-01-16 RX ORDER — SODIUM CHLORIDE 9 MG/ML
1000 INJECTION, SOLUTION INTRAVENOUS
Qty: 0 | Refills: 0 | Status: DISCONTINUED | OUTPATIENT
Start: 2019-01-16 | End: 2019-01-19

## 2019-01-16 RX ORDER — POTASSIUM CHLORIDE 20 MEQ
10 PACKET (EA) ORAL
Qty: 0 | Refills: 0 | Status: COMPLETED | OUTPATIENT
Start: 2019-01-16 | End: 2019-01-16

## 2019-01-16 RX ADMIN — Medication 260 MILLIGRAM(S): at 17:58

## 2019-01-16 RX ADMIN — PANTOPRAZOLE SODIUM 40 MILLIGRAM(S): 20 TABLET, DELAYED RELEASE ORAL at 17:58

## 2019-01-16 RX ADMIN — Medication 260 MILLIGRAM(S): at 07:01

## 2019-01-16 RX ADMIN — ENOXAPARIN SODIUM 40 MILLIGRAM(S): 100 INJECTION SUBCUTANEOUS at 17:58

## 2019-01-16 RX ADMIN — SODIUM CHLORIDE 75 MILLILITER(S): 9 INJECTION, SOLUTION INTRAVENOUS at 05:59

## 2019-01-16 RX ADMIN — Medication 260 MILLIGRAM(S): at 23:35

## 2019-01-16 RX ADMIN — Medication 260 MILLIGRAM(S): at 11:55

## 2019-01-16 RX ADMIN — PANTOPRAZOLE SODIUM 40 MILLIGRAM(S): 20 TABLET, DELAYED RELEASE ORAL at 05:59

## 2019-01-16 RX ADMIN — Medication 260 MILLIGRAM(S): at 14:58

## 2019-01-16 RX ADMIN — Medication 100 MILLIEQUIVALENT(S): at 13:14

## 2019-01-16 RX ADMIN — Medication 100 MILLIGRAM(S): at 11:55

## 2019-01-16 RX ADMIN — Medication 1 MILLIGRAM(S): at 12:51

## 2019-01-16 RX ADMIN — POTASSIUM PHOSPHATE, MONOBASIC POTASSIUM PHOSPHATE, DIBASIC 63.75 MILLIMOLE(S): 236; 224 INJECTION, SOLUTION INTRAVENOUS at 09:09

## 2019-01-16 RX ADMIN — Medication 130 MILLIGRAM(S): at 03:42

## 2019-01-16 RX ADMIN — Medication 100 MILLIEQUIVALENT(S): at 14:25

## 2019-01-16 NOTE — PROGRESS NOTE ADULT - SUBJECTIVE AND OBJECTIVE BOX
# CC: Nausea / vomiting    ## HPI:  53M PMH etOH dependence p/w N/V +body aches for past two days a/w inability to eat or drink. Vomitus was initially food particles, then progressed to dark coffee-ground in appearance.  In ED, /110  spO2 96% [RA] afebrile. Initially admitted to general medicine for gastritis, possible etOH withdrawal.  1/13: RRT called for multiple episodes of agitation and undertreatment for withdrawal. Patient transferred to ICU for closer monitoring.    **O/N:**  Intermittent episodes of somnolence and agitation. No PRN doses given.    ## ROS:  Unobtainable due to sedation, patient mumbling but not coherently    ## Labs:  CBC:                        13.4   7.54  )-----------( 196      ( 16 Jan 2019 04:06 )             41.9     Chem: 01-16  138  |  107  |  9   ----------------------------<  96  3.3<L>   |  21<L>  |  0.68    Ca    8.4<L>      16 Jan 2019 04:06  Phos  2.4     01-16  Mg     2.1     01-16    ## Imaging: No new imaging    ## Medications:  dexmedetomidine Infusion 0.7 MICROgram(s)/kG/Hr IV Continuous <Continuous>  ondansetron Injectable 4 milliGRAM(s) IV Push every 6 hours PRN  PHENobarbital Injectable 260 milliGRAM(s) IV Push every 6 hours  PHENobarbital Injectable 130 milliGRAM(s) IV Push every 15 minutes PRN  PHENobarbital Injectable 260 milliGRAM(s) IV Push every 15 minutes PRN    enoxaparin Injectable 40 milliGRAM(s) SubCutaneous every 24 hours  pantoprazole  Injectable 40 milliGRAM(s) IV Push every 12 hours    ## O/E:  T(F): 97.3 (01-16-19 @ 19:00), Max: 99.8 (01-16-19 @ 15:46)  HR: 71 (01-16-19 @ 20:00) (65 - 95)  BP: 116/73 (01-16-19 @ 20:00) (94/65 - 162/92)  RR: 18 (01-16-19 @ 20:00) (13 - 29)  SpO2: 100% (01-16-19 @ 20:00) (91% - 100%)  IN: 2493.4 mL / OUT: ?? mL / NET: 2493.4 mL    Gen: lying comfortably in bed sedated but arousable  HEENT: PERRL  Resp: CTA B/L no c/r/w  CVS: S1S2 no m/r/g  Abd: soft NT/ND +BS  Ext: no c/c/e  Neuro: sedated but arouses to voice    ## Daily Issues  - Analgesia: N/A  - Sedation: Precedex  - HOB elevation: Y  - GI ppx (ulcers): N/A  - DVT ppx: Lovenox  - Nutrition: PO diet  - Central line: N  - Smith: N    ## Assessment / Plan:  53M PMH etOH dependence now with withdrawal  - Episodes of sedation followed by intermittent episodes of agitation / danger to staff    * Precedex gtt, will attempt to titrate down and determine how many PRN phenobarb doses are required    * c/w Phenobarb 260mg IV Q6h standing    * Phenobarb 130/260 PRN, not with any additional doses at present    * Avoid concomitant benzos whenever possible    * 1:1 for safety    * empiric folate / thiamine repletion, but MCV shows no evidence of macrocytosis (B12 / folate deficiency)    * Continue to monitor airway closely and watch for fever or new infiltrate. Aggressive chest PT and pulmonary toilet as tolerated.  - Patient will remain in ICU while on Precedex gtt    ## Code status:  Goals of care discussion: [x] yes [ ] no  [x] full code  [ ] DNR  [ ] DNI  [ ] SYEDA

## 2019-01-17 LAB
ALBUMIN SERPL ELPH-MCNC: 2.6 G/DL — LOW (ref 3.3–5)
ALP SERPL-CCNC: 41 U/L — SIGNIFICANT CHANGE UP (ref 40–120)
ALT FLD-CCNC: 34 U/L — SIGNIFICANT CHANGE UP (ref 12–78)
ANION GAP SERPL CALC-SCNC: 9 MMOL/L — SIGNIFICANT CHANGE UP (ref 5–17)
AST SERPL-CCNC: 22 U/L — SIGNIFICANT CHANGE UP (ref 15–37)
BILIRUB SERPL-MCNC: 0.4 MG/DL — SIGNIFICANT CHANGE UP (ref 0.2–1.2)
BUN SERPL-MCNC: 5 MG/DL — LOW (ref 7–23)
CALCIUM SERPL-MCNC: 8.5 MG/DL — SIGNIFICANT CHANGE UP (ref 8.5–10.1)
CHLORIDE SERPL-SCNC: 109 MMOL/L — HIGH (ref 96–108)
CO2 SERPL-SCNC: 23 MMOL/L — SIGNIFICANT CHANGE UP (ref 22–31)
CREAT SERPL-MCNC: 0.6 MG/DL — SIGNIFICANT CHANGE UP (ref 0.5–1.3)
GLUCOSE SERPL-MCNC: 97 MG/DL — SIGNIFICANT CHANGE UP (ref 70–99)
HCT VFR BLD CALC: 41.1 % — SIGNIFICANT CHANGE UP (ref 39–50)
HGB BLD-MCNC: 13.4 G/DL — SIGNIFICANT CHANGE UP (ref 13–17)
MAGNESIUM SERPL-MCNC: 2.4 MG/DL — SIGNIFICANT CHANGE UP (ref 1.6–2.6)
MCHC RBC-ENTMCNC: 29.5 PG — SIGNIFICANT CHANGE UP (ref 27–34)
MCHC RBC-ENTMCNC: 32.6 GM/DL — SIGNIFICANT CHANGE UP (ref 32–36)
MCV RBC AUTO: 90.5 FL — SIGNIFICANT CHANGE UP (ref 80–100)
NRBC # BLD: 0 /100 WBCS — SIGNIFICANT CHANGE UP (ref 0–0)
PHOSPHATE SERPL-MCNC: 3.3 MG/DL — SIGNIFICANT CHANGE UP (ref 2.5–4.5)
PLATELET # BLD AUTO: 229 K/UL — SIGNIFICANT CHANGE UP (ref 150–400)
POTASSIUM SERPL-MCNC: 3.4 MMOL/L — LOW (ref 3.5–5.3)
POTASSIUM SERPL-SCNC: 3.4 MMOL/L — LOW (ref 3.5–5.3)
PROT SERPL-MCNC: 6.7 GM/DL — SIGNIFICANT CHANGE UP (ref 6–8.3)
RBC # BLD: 4.54 M/UL — SIGNIFICANT CHANGE UP (ref 4.2–5.8)
RBC # FLD: 12.5 % — SIGNIFICANT CHANGE UP (ref 10.3–14.5)
SODIUM SERPL-SCNC: 141 MMOL/L — SIGNIFICANT CHANGE UP (ref 135–145)
WBC # BLD: 3.92 K/UL — SIGNIFICANT CHANGE UP (ref 3.8–10.5)
WBC # FLD AUTO: 3.92 K/UL — SIGNIFICANT CHANGE UP (ref 3.8–10.5)

## 2019-01-17 PROCEDURE — 99291 CRITICAL CARE FIRST HOUR: CPT

## 2019-01-17 RX ORDER — POTASSIUM CHLORIDE 20 MEQ
10 PACKET (EA) ORAL
Qty: 0 | Refills: 0 | Status: COMPLETED | OUTPATIENT
Start: 2019-01-17 | End: 2019-01-17

## 2019-01-17 RX ORDER — HALOPERIDOL DECANOATE 100 MG/ML
5 INJECTION INTRAMUSCULAR ONCE
Qty: 0 | Refills: 0 | Status: COMPLETED | OUTPATIENT
Start: 2019-01-17 | End: 2019-01-17

## 2019-01-17 RX ORDER — HALOPERIDOL DECANOATE 100 MG/ML
5 INJECTION INTRAMUSCULAR EVERY 6 HOURS
Qty: 0 | Refills: 0 | Status: DISCONTINUED | OUTPATIENT
Start: 2019-01-17 | End: 2019-01-21

## 2019-01-17 RX ADMIN — PANTOPRAZOLE SODIUM 40 MILLIGRAM(S): 20 TABLET, DELAYED RELEASE ORAL at 05:32

## 2019-01-17 RX ADMIN — Medication 260 MILLIGRAM(S): at 00:33

## 2019-01-17 RX ADMIN — DEXMEDETOMIDINE HYDROCHLORIDE IN 0.9% SODIUM CHLORIDE 14.77 MICROGRAM(S)/KG/HR: 4 INJECTION INTRAVENOUS at 20:00

## 2019-01-17 RX ADMIN — Medication 1 MILLIGRAM(S): at 13:34

## 2019-01-17 RX ADMIN — Medication 260 MILLIGRAM(S): at 18:44

## 2019-01-17 RX ADMIN — Medication 100 MILLIEQUIVALENT(S): at 06:47

## 2019-01-17 RX ADMIN — DEXMEDETOMIDINE HYDROCHLORIDE IN 0.9% SODIUM CHLORIDE 14.77 MICROGRAM(S)/KG/HR: 4 INJECTION INTRAVENOUS at 09:51

## 2019-01-17 RX ADMIN — Medication 260 MILLIGRAM(S): at 12:48

## 2019-01-17 RX ADMIN — Medication 130 MILLIGRAM(S): at 20:25

## 2019-01-17 RX ADMIN — DEXMEDETOMIDINE HYDROCHLORIDE IN 0.9% SODIUM CHLORIDE 14.77 MICROGRAM(S)/KG/HR: 4 INJECTION INTRAVENOUS at 19:00

## 2019-01-17 RX ADMIN — ENOXAPARIN SODIUM 40 MILLIGRAM(S): 100 INJECTION SUBCUTANEOUS at 18:45

## 2019-01-17 RX ADMIN — Medication 100 MILLIEQUIVALENT(S): at 05:34

## 2019-01-17 RX ADMIN — Medication 100 MILLIGRAM(S): at 13:33

## 2019-01-17 RX ADMIN — Medication 260 MILLIGRAM(S): at 05:33

## 2019-01-17 RX ADMIN — SODIUM CHLORIDE 75 MILLILITER(S): 9 INJECTION, SOLUTION INTRAVENOUS at 20:31

## 2019-01-17 RX ADMIN — DEXMEDETOMIDINE HYDROCHLORIDE IN 0.9% SODIUM CHLORIDE 14.77 MICROGRAM(S)/KG/HR: 4 INJECTION INTRAVENOUS at 13:34

## 2019-01-17 RX ADMIN — HALOPERIDOL DECANOATE 5 MILLIGRAM(S): 100 INJECTION INTRAMUSCULAR at 21:29

## 2019-01-17 RX ADMIN — DEXMEDETOMIDINE HYDROCHLORIDE IN 0.9% SODIUM CHLORIDE 14.77 MICROGRAM(S)/KG/HR: 4 INJECTION INTRAVENOUS at 01:48

## 2019-01-17 RX ADMIN — PANTOPRAZOLE SODIUM 40 MILLIGRAM(S): 20 TABLET, DELAYED RELEASE ORAL at 18:44

## 2019-01-17 RX ADMIN — Medication 130 MILLIGRAM(S): at 14:16

## 2019-01-17 RX ADMIN — Medication 100 MILLIEQUIVALENT(S): at 08:09

## 2019-01-17 NOTE — PROGRESS NOTE ADULT - SUBJECTIVE AND OBJECTIVE BOX
# CC: Nausea / vomiting    ## HPI:  53M PMH etOH dependence p/w N/V +body aches for past two days a/w inability to eat or drink. Vomitus was initially food particles, then progressed to dark coffee-ground in appearance.  In ED, /110  spO2 96% [RA] afebrile. Initially admitted to general medicine for gastritis, possible etOH withdrawal.  1/13: RRT called for multiple episodes of agitation and undertreatment for withdrawal. Patient transferred to ICU for closer monitoring.    **O/N:**  Intermittent episodes of somnolence and agitation. Precedex increased overnight.    ## ROS:  Unobtainable due to sedation, patient mumbling but not coherently    ## Labs:  CBC:             13.4   3.92  )-----------( 229      ( 17 Jan 2019 04:27 )             41.1     Chem: 01-17  141  |  109<H>  |  5<L>  ----------------------------<  97  3.4<L>   |  23  |  0.60    Ca    8.5      17 Jan 2019 03:41  Phos  3.3     01-17  Mg     2.4     01-17    TPro  6.7  /  Alb  2.6<L>  /  TBili  0.4  /  DBili  x   /  AST  22  /  ALT  34  /  AlkPhos  41  01-17    ## Imaging: No new imaging    ## Medications:  dexmedetomidine Infusion 0.7 MICROgram(s)/kG/Hr IV Continuous <Continuous>  ondansetron Injectable 4 milliGRAM(s) IV Push every 6 hours PRN  PHENobarbital Injectable 260 milliGRAM(s) IV Push every 6 hours  PHENobarbital Injectable 130 milliGRAM(s) IV Push every 15 minutes PRN  PHENobarbital Injectable 260 milliGRAM(s) IV Push every 15 minutes PRN    enoxaparin Injectable 40 milliGRAM(s) SubCutaneous every 24 hours  pantoprazole  Injectable 40 milliGRAM(s) IV Push every 12 hours    ## O/E:  T(F): 98.2 (01-17-19 @ 19:30), Max: 98.7 (01-17-19 @ 16:00)  HR: 105 (01-17-19 @ 19:01) (61 - 105)  BP: 137/88 (01-17-19 @ 19:01) (94/65 - 162/107)  RR: 23 (01-17-19 @ 19:01) (11 - 28)  SpO2: 100% (01-17-19 @ 19:01) (100% - 100%)  IN: 2863.1 mL / OUT: 150 mL / NET: 2713.1 mL    Gen: lying comfortably in bed sedated but arousable  HEENT: PERRL  Resp: rhonchi with stertor  CVS: S1S2 no m/r/g  Abd: soft NT/ND +BS  Ext: no c/c/e  Neuro: sedated but arouses to voice    ## Daily Issues  - Analgesia: N/A  - Sedation: Precedex  - HOB elevation: Y  - GI ppx (ulcers): N/A  - DVT ppx: Lovenox  - Nutrition: PO diet  - Central line: N  - Smith: N    ## Assessment / Plan:  53M PMH etOH dependence now with withdrawal  - Episodes of sedation followed by intermittent episodes of agitation / danger to staff    * Now hospital stay day 7, hopefully     * Precedex gtt, will attempt to titrate down and determine how many PRN phenobarb doses are required    * c/w Phenobarb 260mg IV Q6h standing    * Phenobarb 130/260 PRN    * Avoid concomitant benzos whenever possible    * 1:1 for safety    * empiric folate / thiamine repletion, but MCV shows no evidence of macrocytosis (B12 / folate deficiency)    * Continue to monitor airway closely and watch for fever or new infiltrate. Aggressive chest PT and pulmonary toilet as tolerated. Remains afebrile, no leukocytosis.  - Patient will remain in ICU while on Precedex gtt    ## Code status:  Goals of care discussion: [x] yes [ ] no  [x] full code  [ ] DNR  [ ] DNI  [ ] MOLST

## 2019-01-18 LAB
ALBUMIN SERPL ELPH-MCNC: 2.6 G/DL — LOW (ref 3.3–5)
ALP SERPL-CCNC: 39 U/L — LOW (ref 40–120)
ALT FLD-CCNC: 31 U/L — SIGNIFICANT CHANGE UP (ref 12–78)
ANION GAP SERPL CALC-SCNC: 10 MMOL/L — SIGNIFICANT CHANGE UP (ref 5–17)
AST SERPL-CCNC: 26 U/L — SIGNIFICANT CHANGE UP (ref 15–37)
BILIRUB SERPL-MCNC: 0.4 MG/DL — SIGNIFICANT CHANGE UP (ref 0.2–1.2)
BUN SERPL-MCNC: 6 MG/DL — SIGNIFICANT CHANGE UP (ref 7–23)
CALCIUM SERPL-MCNC: 8.5 MG/DL — SIGNIFICANT CHANGE UP (ref 8.5–10.1)
CHLORIDE SERPL-SCNC: 108 MMOL/L — SIGNIFICANT CHANGE UP (ref 96–108)
CO2 SERPL-SCNC: 24 MMOL/L — SIGNIFICANT CHANGE UP (ref 22–31)
CREAT SERPL-MCNC: 0.6 MG/DL — SIGNIFICANT CHANGE UP (ref 0.5–1.3)
GLUCOSE SERPL-MCNC: 89 MG/DL — SIGNIFICANT CHANGE UP (ref 70–99)
HCT VFR BLD CALC: 40.8 % — SIGNIFICANT CHANGE UP (ref 39–50)
HGB BLD-MCNC: 13.2 G/DL — SIGNIFICANT CHANGE UP (ref 13–17)
MAGNESIUM SERPL-MCNC: 2.2 MG/DL — SIGNIFICANT CHANGE UP (ref 1.6–2.6)
MCHC RBC-ENTMCNC: 29.1 PG — SIGNIFICANT CHANGE UP (ref 27–34)
MCHC RBC-ENTMCNC: 32.4 GM/DL — SIGNIFICANT CHANGE UP (ref 32–36)
MCV RBC AUTO: 89.9 FL — SIGNIFICANT CHANGE UP (ref 80–100)
NRBC # BLD: 0 /100 WBCS — SIGNIFICANT CHANGE UP (ref 0–0)
PHOSPHATE SERPL-MCNC: 3.2 MG/DL — SIGNIFICANT CHANGE UP (ref 2.5–4.5)
PLATELET # BLD AUTO: 269 K/UL — SIGNIFICANT CHANGE UP (ref 150–400)
POTASSIUM SERPL-MCNC: 3.2 MMOL/L — LOW (ref 3.5–5.3)
POTASSIUM SERPL-SCNC: 3.2 MMOL/L — LOW (ref 3.5–5.3)
PROT SERPL-MCNC: 6.7 GM/DL — SIGNIFICANT CHANGE UP (ref 6–8.3)
RBC # BLD: 4.54 M/UL — SIGNIFICANT CHANGE UP (ref 4.2–5.8)
RBC # FLD: 12.3 % — SIGNIFICANT CHANGE UP (ref 10.3–14.5)
SODIUM SERPL-SCNC: 142 MMOL/L — SIGNIFICANT CHANGE UP (ref 135–145)
WBC # BLD: 3.36 K/UL — LOW (ref 3.8–10.5)
WBC # FLD AUTO: 3.36 K/UL — LOW (ref 3.8–10.5)

## 2019-01-18 PROCEDURE — 99291 CRITICAL CARE FIRST HOUR: CPT

## 2019-01-18 PROCEDURE — 71045 X-RAY EXAM CHEST 1 VIEW: CPT | Mod: 26

## 2019-01-18 RX ORDER — CEFTRIAXONE 500 MG/1
1 INJECTION, POWDER, FOR SOLUTION INTRAMUSCULAR; INTRAVENOUS ONCE
Qty: 0 | Refills: 0 | Status: COMPLETED | OUTPATIENT
Start: 2019-01-18 | End: 2019-01-18

## 2019-01-18 RX ORDER — CEFTRIAXONE 500 MG/1
1 INJECTION, POWDER, FOR SOLUTION INTRAMUSCULAR; INTRAVENOUS EVERY 24 HOURS
Qty: 0 | Refills: 0 | Status: DISCONTINUED | OUTPATIENT
Start: 2019-01-19 | End: 2019-01-23

## 2019-01-18 RX ORDER — CEFTRIAXONE 500 MG/1
INJECTION, POWDER, FOR SOLUTION INTRAMUSCULAR; INTRAVENOUS
Qty: 0 | Refills: 0 | Status: DISCONTINUED | OUTPATIENT
Start: 2019-01-18 | End: 2019-01-23

## 2019-01-18 RX ORDER — ACETAMINOPHEN 500 MG
650 TABLET ORAL EVERY 6 HOURS
Qty: 0 | Refills: 0 | Status: DISCONTINUED | OUTPATIENT
Start: 2019-01-18 | End: 2019-01-28

## 2019-01-18 RX ORDER — POTASSIUM CHLORIDE 20 MEQ
10 PACKET (EA) ORAL
Qty: 0 | Refills: 0 | Status: COMPLETED | OUTPATIENT
Start: 2019-01-18 | End: 2019-01-18

## 2019-01-18 RX ORDER — METRONIDAZOLE 500 MG
500 TABLET ORAL ONCE
Qty: 0 | Refills: 0 | Status: COMPLETED | OUTPATIENT
Start: 2019-01-18 | End: 2019-01-18

## 2019-01-18 RX ORDER — METRONIDAZOLE 500 MG
TABLET ORAL
Qty: 0 | Refills: 0 | Status: DISCONTINUED | OUTPATIENT
Start: 2019-01-18 | End: 2019-01-23

## 2019-01-18 RX ORDER — METRONIDAZOLE 500 MG
500 TABLET ORAL EVERY 8 HOURS
Qty: 0 | Refills: 0 | Status: DISCONTINUED | OUTPATIENT
Start: 2019-01-18 | End: 2019-01-23

## 2019-01-18 RX ADMIN — Medication 260 MILLIGRAM(S): at 07:29

## 2019-01-18 RX ADMIN — Medication 260 MILLIGRAM(S): at 11:49

## 2019-01-18 RX ADMIN — Medication 100 MILLIGRAM(S): at 22:52

## 2019-01-18 RX ADMIN — Medication 650 MILLIGRAM(S): at 12:50

## 2019-01-18 RX ADMIN — Medication 100 MILLIGRAM(S): at 12:51

## 2019-01-18 RX ADMIN — Medication 260 MILLIGRAM(S): at 21:25

## 2019-01-18 RX ADMIN — Medication 100 MILLIEQUIVALENT(S): at 06:07

## 2019-01-18 RX ADMIN — Medication 260 MILLIGRAM(S): at 18:16

## 2019-01-18 RX ADMIN — PANTOPRAZOLE SODIUM 40 MILLIGRAM(S): 20 TABLET, DELAYED RELEASE ORAL at 06:37

## 2019-01-18 RX ADMIN — Medication 100 MILLIEQUIVALENT(S): at 07:31

## 2019-01-18 RX ADMIN — PANTOPRAZOLE SODIUM 40 MILLIGRAM(S): 20 TABLET, DELAYED RELEASE ORAL at 18:16

## 2019-01-18 RX ADMIN — CEFTRIAXONE 100 GRAM(S): 500 INJECTION, POWDER, FOR SOLUTION INTRAMUSCULAR; INTRAVENOUS at 13:05

## 2019-01-18 RX ADMIN — ENOXAPARIN SODIUM 40 MILLIGRAM(S): 100 INJECTION SUBCUTANEOUS at 18:16

## 2019-01-18 RX ADMIN — Medication 100 MILLIEQUIVALENT(S): at 08:49

## 2019-01-18 RX ADMIN — Medication 650 MILLIGRAM(S): at 13:50

## 2019-01-18 RX ADMIN — Medication 1 MILLIGRAM(S): at 13:12

## 2019-01-18 RX ADMIN — HALOPERIDOL DECANOATE 5 MILLIGRAM(S): 100 INJECTION INTRAMUSCULAR at 22:52

## 2019-01-18 NOTE — PROGRESS NOTE ADULT - SUBJECTIVE AND OBJECTIVE BOX
# CC: Nausea / vomiting    ## HPI:  53M PMH etOH dependence p/w N/V +body aches for past two days a/w inability to eat or drink. Vomitus was initially food particles, then progressed to dark coffee-ground in appearance.  In ED, /110  spO2 96% [RA] afebrile. Initially admitted to general medicine for gastritis, possible etOH withdrawal.  1/13: RRT called for multiple episodes of agitation and undertreatment for withdrawal. Patient transferred to ICU for closer monitoring.    **O/N:**  Intermittent episodes of somnolence and agitation. Precedex increased overnight.    ## ROS:  Unobtainable due to sedation, patient mumbling but not coherently    ## Labs:  CBC:                        13.2   3.36  )-----------( 269      ( 18 Jan 2019 04:11 )             40.8       Chem:  01-18    142  |  108  |  6   ----------------------------<  89  3.2<L>   |  24  |  0.60    Ca    8.5      18 Jan 2019 04:11  Phos  3.2     01-18  Mg     2.2     01-18    TPro  6.7  /  Alb  2.6<L>  /  TBili  0.4  /  DBili  x   /  AST  26  /  ALT  31  /  AlkPhos  39<L>  01-18    ## Imaging:  < from: Xray Chest 1 View AP/PA. (01.18.19 @ 12:39) >  Bibasilar atelectases  < end of copied text >    ## Medications:  cefTRIAXone   IVPB      metroNIDAZOLE  IVPB 500 milliGRAM(s) IV Intermittent every 8 hours    acetaminophen  Suppository .. 650 milliGRAM(s) Rectal every 6 hours PRN  haloperidol    Injectable 5 milliGRAM(s) IntraMuscular every 6 hours PRN  ondansetron Injectable 4 milliGRAM(s) IV Push every 6 hours PRN  PHENobarbital Injectable 260 milliGRAM(s) IV Push every 6 hours  PHENobarbital Injectable 130 milliGRAM(s) IV Push every 15 minutes PRN  PHENobarbital Injectable 260 milliGRAM(s) IV Push every 15 minutes PRN    enoxaparin Injectable 40 milliGRAM(s) SubCutaneous every 24 hours  pantoprazole  Injectable 40 milliGRAM(s) IV Push every 12 hours    ## O/E:  T(F): 99.4 (01-18-19 @ 19:22), Max: 102.8 (01-18-19 @ 12:00)  HR: 121 (01-18-19 @ 22:00) (66 - 134)  BP: 153/101 (01-18-19 @ 21:00) (102/65 - 179/76)  RR: 32 (01-18-19 @ 22:00) (13 - 32)  SpO2: 99% (01-18-19 @ 22:00) (95% - 100%)  IN: 2369.8 mL / OUT: ?? mL    Gen: lying comfortably in bed sedated but arousable  HEENT: PERRL  Resp: scattered rhonchi with stertor  CVS: S1S2 no m/r/g  Abd: soft NT/ND +BS  Ext: no c/c/e  Neuro: sedated but arouses to voice with agitation    ## Daily Issues  - Analgesia: N/A  - Sedation: N/A  - HOB elevation: Y  - GI ppx (ulcers): N/A  - DVT ppx: Lovenox  - Nutrition: PO diet  - Central line: N  - Smith: N    ## Assessment / Plan:  53M PMH etOH dependence now with withdrawal  - Episodes of sedation followed by intermittent episodes of agitation / danger to staff    * Now hospital stay day 8, hopefully will be over withdrawal symptoms soon. It is possible that his agitation is not only due to withdrawal but also due to underlying psychiatric issues.    * D/C Precedex and keep only on phenobarb    * c/w Phenobarb 260mg IV Q6h standing    * Phenobarb 130/260 PRN    * Avoid concomitant benzos whenever possible    * Added Haldol PRN agitation for delirium / psychiatric agitation.    * 1:1 for safety    * empiric folate / thiamine repletion, but MCV shows no evidence of macrocytosis (B12 / folate deficiency)    * Now with fever and worsening cough, concern for aspiration. Starting empiric ceftriaxone / Flagyl for possible aspiration pneumonia. Low threshold for intubation.  - Patient will remain in ICU for airway monitoring.    ## Code status:  Goals of care discussion: [x] yes [ ] no  [x] full code  [ ] DNR  [ ] DNI  [ ] MOLST    Total attending critical care time spent: 35 minutes

## 2019-01-19 LAB
ALBUMIN SERPL ELPH-MCNC: 3 G/DL — LOW (ref 3.3–5)
ALP SERPL-CCNC: 41 U/L — SIGNIFICANT CHANGE UP (ref 40–120)
ALT FLD-CCNC: 31 U/L — SIGNIFICANT CHANGE UP (ref 12–78)
ANION GAP SERPL CALC-SCNC: 14 MMOL/L — SIGNIFICANT CHANGE UP (ref 5–17)
AST SERPL-CCNC: 34 U/L — SIGNIFICANT CHANGE UP (ref 15–37)
BILIRUB SERPL-MCNC: 0.4 MG/DL — SIGNIFICANT CHANGE UP (ref 0.2–1.2)
BUN SERPL-MCNC: 4 MG/DL — LOW (ref 7–23)
CALCIUM SERPL-MCNC: 8.9 MG/DL — SIGNIFICANT CHANGE UP (ref 8.5–10.1)
CHLORIDE SERPL-SCNC: 108 MMOL/L — SIGNIFICANT CHANGE UP (ref 96–108)
CO2 SERPL-SCNC: 20 MMOL/L — LOW (ref 22–31)
CREAT SERPL-MCNC: 0.66 MG/DL — SIGNIFICANT CHANGE UP (ref 0.5–1.3)
GLUCOSE SERPL-MCNC: 80 MG/DL — SIGNIFICANT CHANGE UP (ref 70–99)
HCT VFR BLD CALC: 45.6 % — SIGNIFICANT CHANGE UP (ref 39–50)
HGB BLD-MCNC: 14.8 G/DL — SIGNIFICANT CHANGE UP (ref 13–17)
MAGNESIUM SERPL-MCNC: 2 MG/DL — SIGNIFICANT CHANGE UP (ref 1.6–2.6)
MCHC RBC-ENTMCNC: 29.1 PG — SIGNIFICANT CHANGE UP (ref 27–34)
MCHC RBC-ENTMCNC: 32.5 GM/DL — SIGNIFICANT CHANGE UP (ref 32–36)
MCV RBC AUTO: 89.8 FL — SIGNIFICANT CHANGE UP (ref 80–100)
NRBC # BLD: 0 /100 WBCS — SIGNIFICANT CHANGE UP (ref 0–0)
PHOSPHATE SERPL-MCNC: 3.1 MG/DL — SIGNIFICANT CHANGE UP (ref 2.5–4.5)
PLATELET # BLD AUTO: 275 K/UL — SIGNIFICANT CHANGE UP (ref 150–400)
POTASSIUM SERPL-MCNC: 3.3 MMOL/L — LOW (ref 3.5–5.3)
POTASSIUM SERPL-SCNC: 3.3 MMOL/L — LOW (ref 3.5–5.3)
PROT SERPL-MCNC: 7.6 GM/DL — SIGNIFICANT CHANGE UP (ref 6–8.3)
RBC # BLD: 5.08 M/UL — SIGNIFICANT CHANGE UP (ref 4.2–5.8)
RBC # FLD: 12.4 % — SIGNIFICANT CHANGE UP (ref 10.3–14.5)
SODIUM SERPL-SCNC: 142 MMOL/L — SIGNIFICANT CHANGE UP (ref 135–145)
WBC # BLD: 2.98 K/UL — LOW (ref 3.8–10.5)
WBC # FLD AUTO: 2.98 K/UL — LOW (ref 3.8–10.5)

## 2019-01-19 PROCEDURE — 99291 CRITICAL CARE FIRST HOUR: CPT

## 2019-01-19 RX ORDER — SODIUM CHLORIDE 9 MG/ML
1000 INJECTION, SOLUTION INTRAVENOUS
Qty: 0 | Refills: 0 | Status: DISCONTINUED | OUTPATIENT
Start: 2019-01-19 | End: 2019-01-22

## 2019-01-19 RX ORDER — POTASSIUM CHLORIDE 20 MEQ
10 PACKET (EA) ORAL
Qty: 0 | Refills: 0 | Status: COMPLETED | OUTPATIENT
Start: 2019-01-19 | End: 2019-01-19

## 2019-01-19 RX ORDER — FUROSEMIDE 40 MG
20 TABLET ORAL ONCE
Qty: 0 | Refills: 0 | Status: COMPLETED | OUTPATIENT
Start: 2019-01-19 | End: 2019-01-19

## 2019-01-19 RX ADMIN — PANTOPRAZOLE SODIUM 40 MILLIGRAM(S): 20 TABLET, DELAYED RELEASE ORAL at 06:22

## 2019-01-19 RX ADMIN — Medication 650 MILLIGRAM(S): at 23:57

## 2019-01-19 RX ADMIN — Medication 100 MILLIGRAM(S): at 23:07

## 2019-01-19 RX ADMIN — CEFTRIAXONE 100 GRAM(S): 500 INJECTION, POWDER, FOR SOLUTION INTRAMUSCULAR; INTRAVENOUS at 11:35

## 2019-01-19 RX ADMIN — Medication 100 MILLIEQUIVALENT(S): at 08:21

## 2019-01-19 RX ADMIN — Medication 260 MILLIGRAM(S): at 11:36

## 2019-01-19 RX ADMIN — Medication 20 MILLIGRAM(S): at 18:34

## 2019-01-19 RX ADMIN — Medication 100 MILLIEQUIVALENT(S): at 05:45

## 2019-01-19 RX ADMIN — SODIUM CHLORIDE 75 MILLILITER(S): 9 INJECTION, SOLUTION INTRAVENOUS at 04:30

## 2019-01-19 RX ADMIN — Medication 260 MILLIGRAM(S): at 18:34

## 2019-01-19 RX ADMIN — SODIUM CHLORIDE 75 MILLILITER(S): 9 INJECTION, SOLUTION INTRAVENOUS at 16:59

## 2019-01-19 RX ADMIN — Medication 260 MILLIGRAM(S): at 17:25

## 2019-01-19 RX ADMIN — Medication 260 MILLIGRAM(S): at 00:06

## 2019-01-19 RX ADMIN — Medication 100 MILLIGRAM(S): at 06:22

## 2019-01-19 RX ADMIN — Medication 100 MILLIGRAM(S): at 14:13

## 2019-01-19 RX ADMIN — Medication 650 MILLIGRAM(S): at 23:07

## 2019-01-19 RX ADMIN — PANTOPRAZOLE SODIUM 40 MILLIGRAM(S): 20 TABLET, DELAYED RELEASE ORAL at 17:24

## 2019-01-19 RX ADMIN — ENOXAPARIN SODIUM 40 MILLIGRAM(S): 100 INJECTION SUBCUTANEOUS at 17:25

## 2019-01-19 RX ADMIN — Medication 1 MILLIGRAM(S): at 12:23

## 2019-01-19 RX ADMIN — Medication 100 MILLIEQUIVALENT(S): at 07:05

## 2019-01-19 RX ADMIN — Medication 260 MILLIGRAM(S): at 06:22

## 2019-01-19 RX ADMIN — Medication 100 MILLIGRAM(S): at 11:36

## 2019-01-19 RX ADMIN — SODIUM CHLORIDE 75 MILLILITER(S): 9 INJECTION, SOLUTION INTRAVENOUS at 00:05

## 2019-01-19 NOTE — PROGRESS NOTE ADULT - SUBJECTIVE AND OBJECTIVE BOX
# CC: Nausea / vomiting    ## HPI:  53M PMH etOH dependence p/w N/V +body aches for past two days a/w inability to eat or drink. Vomitus was initially food particles, then progressed to dark coffee-ground in appearance.  In ED, /110  spO2 96% [RA] afebrile. Initially admitted to general medicine for gastritis, possible etOH withdrawal.  1/13: RRT called for multiple episodes of agitation and undertreatment for withdrawal. Patient transferred to ICU for closer monitoring.    **O/N:**  Intermittent episodes of somnolence and agitation. Off Precedex, required 2 doses of PRN Precedex and one dose of Haldol.    ## ROS:  Unobtainable due to sedation, patient mumbling but not coherently    ## Labs:  CBC:             14.8   2.98  )-----------( 275      ( 19 Jan 2019 04:08 )             45.6     Chem: 01-19  142  |  108  |  4<L>  ----------------------------<  80  3.3<L>   |  20<L>  |  0.66    Ca    8.9      19 Jan 2019 04:08  Phos  3.1     01-19  Mg     2.0     01-19    TPro  7.6  /  Alb  3.0<L>  /  TBili  0.4  /  DBili  x   /  AST  34  /  ALT  31  /  AlkPhos  41  01-19    ## Imaging: No new imaging    ## Medications:  cefTRIAXone   IVPB 1 Gram(s) IV Intermittent every 24 hours  metroNIDAZOLE  IVPB 500 milliGRAM(s) IV Intermittent every 8 hours    acetaminophen  Suppository .. 650 milliGRAM(s) Rectal every 6 hours PRN  haloperidol    Injectable 5 milliGRAM(s) IntraMuscular every 6 hours PRN  ondansetron Injectable 4 milliGRAM(s) IV Push every 6 hours PRN  PHENobarbital Injectable 260 milliGRAM(s) IV Push every 6 hours  PHENobarbital Injectable 130 milliGRAM(s) IV Push every 15 minutes PRN  PHENobarbital Injectable 260 milliGRAM(s) IV Push every 15 minutes PRN    enoxaparin Injectable 40 milliGRAM(s) SubCutaneous every 24 hours  pantoprazole  Injectable 40 milliGRAM(s) IV Push every 12 hours    ## O/E:  T(F): 100.8 (01-19-19 @ 23:00), Max: 100.8 (01-19-19 @ 23:00)  HR: 100 (01-20-19 @ 00:00) (94 - 118)  BP: 140/95 (01-20-19 @ 00:00) (107/81 - 169/85)  RR: 15 (01-20-19 @ 00:00) (13 - 35)  SpO2: 100% (01-19-19 @ 20:00) (95% - 100%)  IN: 2561.6 mL / OUT: ?? mL    Gen: lying comfortably in bed, sedated but arousable  HEENT: could not perform due to patient agitation  Resp: worsening rhonchi with stertor  CVS: S1S2 no m/r/g  Abd: soft NT/ND +BS  Ext: no c/c/e  Neuro: sedated but arouses to voice with agitation    ## Daily Issues  - Analgesia: N/A  - Sedation: N/A  - HOB elevation: Y  - GI ppx (ulcers): N/A  - DVT ppx: Lovenox  - Nutrition: PO diet  - Central line: N  - Smith: N    ## Assessment / Plan:  53M PMH etOH dependence now with withdrawal  - Episodes of sedation followed by intermittent episodes of agitation / danger to staff    * Now hospital stay day 9, hopefully will be over withdrawal symptoms soon. It is possible that his agitation is not only due to withdrawal but also due to underlying psychiatric issues.    * c/w Phenobarb 260mg IV Q6h standing    * Phenobarb 130/260 PRN, needed two doses over past day    * Avoid concomitant benzos whenever possible    * Added Haldol PRN agitation for delirium / psychiatric agitation, got one dose    * 1:1 for safety    * empiric folate / thiamine repletion, but MCV shows no evidence of macrocytosis (B12 / folate deficiency)    * Now with fever and worsening cough, concern for aspiration. Starting empiric ceftriaxone / Flagyl for possible aspiration pneumonia. Low threshold for intubation.  - Patient will remain in ICU for airway monitoring.    ## Code status:  Goals of care discussion: [x] yes [ ] no  [x] full code  [ ] DNR  [ ] DNI  [ ] MOLST    Total attending critical care time spent: 35 minutes

## 2019-01-20 LAB
ANION GAP SERPL CALC-SCNC: 10 MMOL/L — SIGNIFICANT CHANGE UP (ref 5–17)
BUN SERPL-MCNC: 4 MG/DL — LOW (ref 7–23)
CALCIUM SERPL-MCNC: 8.7 MG/DL — SIGNIFICANT CHANGE UP (ref 8.5–10.1)
CHLORIDE SERPL-SCNC: 107 MMOL/L — SIGNIFICANT CHANGE UP (ref 96–108)
CO2 SERPL-SCNC: 24 MMOL/L — SIGNIFICANT CHANGE UP (ref 22–31)
CREAT SERPL-MCNC: 0.66 MG/DL — SIGNIFICANT CHANGE UP (ref 0.5–1.3)
GLUCOSE SERPL-MCNC: 92 MG/DL — SIGNIFICANT CHANGE UP (ref 70–99)
HCT VFR BLD CALC: 45.6 % — SIGNIFICANT CHANGE UP (ref 39–50)
HGB BLD-MCNC: 14.6 G/DL — SIGNIFICANT CHANGE UP (ref 13–17)
MAGNESIUM SERPL-MCNC: 2.2 MG/DL — SIGNIFICANT CHANGE UP (ref 1.6–2.6)
MCHC RBC-ENTMCNC: 28.5 PG — SIGNIFICANT CHANGE UP (ref 27–34)
MCHC RBC-ENTMCNC: 32 GM/DL — SIGNIFICANT CHANGE UP (ref 32–36)
MCV RBC AUTO: 89.1 FL — SIGNIFICANT CHANGE UP (ref 80–100)
NRBC # BLD: 0 /100 WBCS — SIGNIFICANT CHANGE UP (ref 0–0)
PHOSPHATE SERPL-MCNC: 2.6 MG/DL — SIGNIFICANT CHANGE UP (ref 2.5–4.5)
PLATELET # BLD AUTO: 379 K/UL — SIGNIFICANT CHANGE UP (ref 150–400)
POTASSIUM SERPL-MCNC: 3 MMOL/L — LOW (ref 3.5–5.3)
POTASSIUM SERPL-SCNC: 3 MMOL/L — LOW (ref 3.5–5.3)
RBC # BLD: 5.12 M/UL — SIGNIFICANT CHANGE UP (ref 4.2–5.8)
RBC # FLD: 12.5 % — SIGNIFICANT CHANGE UP (ref 10.3–14.5)
SODIUM SERPL-SCNC: 141 MMOL/L — SIGNIFICANT CHANGE UP (ref 135–145)
WBC # BLD: 3.83 K/UL — SIGNIFICANT CHANGE UP (ref 3.8–10.5)
WBC # FLD AUTO: 3.83 K/UL — SIGNIFICANT CHANGE UP (ref 3.8–10.5)

## 2019-01-20 PROCEDURE — 99291 CRITICAL CARE FIRST HOUR: CPT

## 2019-01-20 RX ORDER — PHENOBARBITAL 60 MG
130 TABLET ORAL EVERY 6 HOURS
Qty: 0 | Refills: 0 | Status: DISCONTINUED | OUTPATIENT
Start: 2019-01-20 | End: 2019-01-25

## 2019-01-20 RX ORDER — SODIUM CHLORIDE 9 MG/ML
1000 INJECTION, SOLUTION INTRAVENOUS ONCE
Qty: 0 | Refills: 0 | Status: COMPLETED | OUTPATIENT
Start: 2019-01-20 | End: 2019-01-20

## 2019-01-20 RX ORDER — TAMSULOSIN HYDROCHLORIDE 0.4 MG/1
0.4 CAPSULE ORAL AT BEDTIME
Qty: 0 | Refills: 0 | Status: DISCONTINUED | OUTPATIENT
Start: 2019-01-20 | End: 2019-01-23

## 2019-01-20 RX ORDER — POTASSIUM CHLORIDE 20 MEQ
10 PACKET (EA) ORAL
Qty: 0 | Refills: 0 | Status: COMPLETED | OUTPATIENT
Start: 2019-01-20 | End: 2019-01-20

## 2019-01-20 RX ORDER — PHENOBARBITAL 60 MG
130 TABLET ORAL
Qty: 0 | Refills: 0 | Status: DISCONTINUED | OUTPATIENT
Start: 2019-01-20 | End: 2019-01-25

## 2019-01-20 RX ORDER — FUROSEMIDE 40 MG
20 TABLET ORAL ONCE
Qty: 0 | Refills: 0 | Status: COMPLETED | OUTPATIENT
Start: 2019-01-20 | End: 2019-01-20

## 2019-01-20 RX ORDER — PHENOBARBITAL 60 MG
65 TABLET ORAL
Qty: 0 | Refills: 0 | Status: DISCONTINUED | OUTPATIENT
Start: 2019-01-20 | End: 2019-01-26

## 2019-01-20 RX ADMIN — Medication 1 MILLIGRAM(S): at 13:01

## 2019-01-20 RX ADMIN — Medication 100 MILLIEQUIVALENT(S): at 10:16

## 2019-01-20 RX ADMIN — CEFTRIAXONE 100 GRAM(S): 500 INJECTION, POWDER, FOR SOLUTION INTRAMUSCULAR; INTRAVENOUS at 12:19

## 2019-01-20 RX ADMIN — PANTOPRAZOLE SODIUM 40 MILLIGRAM(S): 20 TABLET, DELAYED RELEASE ORAL at 17:18

## 2019-01-20 RX ADMIN — Medication 260 MILLIGRAM(S): at 00:04

## 2019-01-20 RX ADMIN — Medication 20 MILLIGRAM(S): at 19:47

## 2019-01-20 RX ADMIN — Medication 260 MILLIGRAM(S): at 05:54

## 2019-01-20 RX ADMIN — Medication 100 MILLIEQUIVALENT(S): at 06:57

## 2019-01-20 RX ADMIN — Medication 100 MILLIGRAM(S): at 13:03

## 2019-01-20 RX ADMIN — Medication 100 MILLIEQUIVALENT(S): at 06:09

## 2019-01-20 RX ADMIN — Medication 100 MILLIGRAM(S): at 23:01

## 2019-01-20 RX ADMIN — Medication 100 MILLIEQUIVALENT(S): at 08:04

## 2019-01-20 RX ADMIN — Medication 65 MILLIGRAM(S): at 19:55

## 2019-01-20 RX ADMIN — Medication 100 MILLIGRAM(S): at 13:00

## 2019-01-20 RX ADMIN — Medication 100 MILLIEQUIVALENT(S): at 09:18

## 2019-01-20 RX ADMIN — Medication 100 MILLIEQUIVALENT(S): at 11:18

## 2019-01-20 RX ADMIN — Medication 130 MILLIGRAM(S): at 20:57

## 2019-01-20 RX ADMIN — SODIUM CHLORIDE 75 MILLILITER(S): 9 INJECTION, SOLUTION INTRAVENOUS at 22:00

## 2019-01-20 RX ADMIN — PANTOPRAZOLE SODIUM 40 MILLIGRAM(S): 20 TABLET, DELAYED RELEASE ORAL at 05:54

## 2019-01-20 RX ADMIN — SODIUM CHLORIDE 1000 MILLILITER(S): 9 INJECTION, SOLUTION INTRAVENOUS at 13:57

## 2019-01-20 RX ADMIN — ENOXAPARIN SODIUM 40 MILLIGRAM(S): 100 INJECTION SUBCUTANEOUS at 17:18

## 2019-01-20 RX ADMIN — Medication 130 MILLIGRAM(S): at 23:16

## 2019-01-20 RX ADMIN — Medication 100 MILLIGRAM(S): at 05:54

## 2019-01-20 RX ADMIN — Medication 130 MILLIGRAM(S): at 17:17

## 2019-01-20 NOTE — PROGRESS NOTE ADULT - SUBJECTIVE AND OBJECTIVE BOX
# CC: Nausea / vomiting    ## HPI:  53M PMH etOH dependence p/w N/V +body aches for past two days a/w inability to eat or drink. Vomitus was initially food particles, then progressed to dark coffee-ground in appearance.  In ED, /110  spO2 96% [RA] afebrile. Initially admitted to general medicine for gastritis, possible etOH withdrawal.  1/13: RRT called for multiple episodes of agitation and undertreatment for withdrawal. Patient transferred to ICU for closer monitoring.    **O/N:**  Intermittent episodes of somnolence and agitation.    ## ROS:  Unobtainable due to sedation, patient mumbling but not coherently in [facility].    ## Labs:  CBC:                        14.6   3.83  )-----------( 379      ( 20 Jan 2019 04:27 )             45.6       Chem: 01-20  141  |  107  |  4<L>  ----------------------------<  92  3.0<L>   |  24  |  0.66    Ca    8.7      20 Jan 2019 04:27  Phos  2.6     01-20  Mg     2.2     01-20    TPro  7.6  /  Alb  3.0<L>  /  TBili  0.4  /  DBili  x   /  AST  34  /  ALT  31  /  AlkPhos  41  01-19    ## Imaging: No new imaging    ## Medications:  tamsulosin 0.4 milliGRAM(s) Oral at bedtime    cefTRIAXone   IVPB 1 Gram(s) IV Intermittent every 24 hours  metroNIDAZOLE  IVPB 500 milliGRAM(s) IV Intermittent every 8 hours    acetaminophen  Suppository .. 650 milliGRAM(s) Rectal every 6 hours PRN  haloperidol    Injectable 5 milliGRAM(s) IntraMuscular every 6 hours PRN  ondansetron Injectable 4 milliGRAM(s) IV Push every 6 hours PRN  PHENobarbital Injectable 130 milliGRAM(s) IV Push every 6 hours  PHENobarbital Injectable 65 milliGRAM(s) IV Push every 15 minutes PRN  PHENobarbital Injectable 130 milliGRAM(s) IV Push every 15 minutes PRN    enoxaparin Injectable 40 milliGRAM(s) SubCutaneous every 24 hours  pantoprazole  Injectable 40 milliGRAM(s) IV Push every 12 hours    ## O/E:  T(F): 98.7 (01-20-19 @ 19:10), Max: 99.9 (01-20-19 @ 07:30)  HR: 93 (01-20-19 @ 22:00) (88 - 105)  BP: 154/96 (01-20-19 @ 21:00) (97/56 - 159/102)  RR: 32 (01-20-19 @ 22:00) (10 - 32)  SpO2: 100% (01-20-19 @ 22:00) (97% - 100%)  IN: 2125 mL / OUT: 1460 mL / NET: 665 mL    Gen: lying comfortably in bed, sedated but arousable  HEENT: PERRL  Resp: scattered rhonchi, occasional cough  CVS: S1S2 no m/r/g  Abd: soft NT/ND +BS  Ext: no c/c; trace B/L LE edema  Neuro: sedated but arouses to voice with agitation    ## Daily Issues  - Analgesia: N/A  - Sedation: N/A  - HOB elevation: Y  - GI ppx (ulcers): N/A  - DVT ppx: Lovenox  - Nutrition: PO diet  - Central line: N  - Smith: N    ## Assessment / Plan:  53M PMH etOH dependence now with withdrawal  - Episodes of sedation followed by intermittent episodes of agitation / danger to staff    * Now hospital stay day 10, likely no longer withdrawing. It is possible that his agitation is not only due to withdrawal but also due to underlying psychiatric issues.    * c/w Phenobarb 260mg IV Q6h standing    * Phenobarb 130/260 PRN    * Avoid concomitant benzos whenever possible    * c/w Haldol PRN agitation for delirium / psychiatric agitation, got one dose    * 1:1 for safety    * empiric folate / thiamine repletion, but MCV shows no evidence of macrocytosis (B12 / folate deficiency)    * Now with fever and worsening cough, concern for aspiration. Starting empiric ceftriaxone / Flagyl for possible aspiration pneumonia. Low threshold for intubation.  - Patient will remain in ICU for airway monitoring.    ## Code status:  Goals of care discussion: [x] yes [ ] no  [x] full code  [ ] DNR  [ ] DNI  [ ] MOLST    Total attending critical care time spent: 35 minutes

## 2019-01-21 LAB
ANION GAP SERPL CALC-SCNC: 9 MMOL/L — SIGNIFICANT CHANGE UP (ref 5–17)
BUN SERPL-MCNC: 5 MG/DL — LOW (ref 7–23)
CALCIUM SERPL-MCNC: 8.7 MG/DL — SIGNIFICANT CHANGE UP (ref 8.5–10.1)
CHLORIDE SERPL-SCNC: 107 MMOL/L — SIGNIFICANT CHANGE UP (ref 96–108)
CO2 SERPL-SCNC: 24 MMOL/L — SIGNIFICANT CHANGE UP (ref 22–31)
CREAT SERPL-MCNC: 0.71 MG/DL — SIGNIFICANT CHANGE UP (ref 0.5–1.3)
GLUCOSE SERPL-MCNC: 105 MG/DL — HIGH (ref 70–99)
HCT VFR BLD CALC: 49.3 % — SIGNIFICANT CHANGE UP (ref 39–50)
HGB BLD-MCNC: 15.2 G/DL — SIGNIFICANT CHANGE UP (ref 13–17)
MAGNESIUM SERPL-MCNC: 2.1 MG/DL — SIGNIFICANT CHANGE UP (ref 1.6–2.6)
MCHC RBC-ENTMCNC: 28.4 PG — SIGNIFICANT CHANGE UP (ref 27–34)
MCHC RBC-ENTMCNC: 30.8 GM/DL — LOW (ref 32–36)
MCV RBC AUTO: 92 FL — SIGNIFICANT CHANGE UP (ref 80–100)
NRBC # BLD: 0 /100 WBCS — SIGNIFICANT CHANGE UP (ref 0–0)
PHOSPHATE SERPL-MCNC: 2.7 MG/DL — SIGNIFICANT CHANGE UP (ref 2.5–4.5)
PLATELET # BLD AUTO: 166 K/UL — SIGNIFICANT CHANGE UP (ref 150–400)
POTASSIUM SERPL-MCNC: 3.7 MMOL/L — SIGNIFICANT CHANGE UP (ref 3.5–5.3)
POTASSIUM SERPL-SCNC: 3.7 MMOL/L — SIGNIFICANT CHANGE UP (ref 3.5–5.3)
RBC # BLD: 5.36 M/UL — SIGNIFICANT CHANGE UP (ref 4.2–5.8)
RBC # FLD: 12.8 % — SIGNIFICANT CHANGE UP (ref 10.3–14.5)
SODIUM SERPL-SCNC: 140 MMOL/L — SIGNIFICANT CHANGE UP (ref 135–145)
WBC # BLD: 3.82 K/UL — SIGNIFICANT CHANGE UP (ref 3.8–10.5)
WBC # FLD AUTO: 3.82 K/UL — SIGNIFICANT CHANGE UP (ref 3.8–10.5)

## 2019-01-21 PROCEDURE — 99291 CRITICAL CARE FIRST HOUR: CPT

## 2019-01-21 RX ADMIN — Medication 130 MILLIGRAM(S): at 17:40

## 2019-01-21 RX ADMIN — Medication 100 MILLIGRAM(S): at 05:57

## 2019-01-21 RX ADMIN — TAMSULOSIN HYDROCHLORIDE 0.4 MILLIGRAM(S): 0.4 CAPSULE ORAL at 21:36

## 2019-01-21 RX ADMIN — ENOXAPARIN SODIUM 40 MILLIGRAM(S): 100 INJECTION SUBCUTANEOUS at 17:39

## 2019-01-21 RX ADMIN — Medication 100 MILLIGRAM(S): at 12:10

## 2019-01-21 RX ADMIN — PANTOPRAZOLE SODIUM 40 MILLIGRAM(S): 20 TABLET, DELAYED RELEASE ORAL at 17:39

## 2019-01-21 RX ADMIN — Medication 130 MILLIGRAM(S): at 00:39

## 2019-01-21 RX ADMIN — Medication 1 MILLIGRAM(S): at 12:10

## 2019-01-21 RX ADMIN — CEFTRIAXONE 100 GRAM(S): 500 INJECTION, POWDER, FOR SOLUTION INTRAMUSCULAR; INTRAVENOUS at 12:10

## 2019-01-21 RX ADMIN — Medication 130 MILLIGRAM(S): at 12:09

## 2019-01-21 RX ADMIN — PANTOPRAZOLE SODIUM 40 MILLIGRAM(S): 20 TABLET, DELAYED RELEASE ORAL at 05:56

## 2019-01-21 RX ADMIN — Medication 130 MILLIGRAM(S): at 05:56

## 2019-01-21 RX ADMIN — Medication 100 MILLIGRAM(S): at 14:35

## 2019-01-21 RX ADMIN — Medication 100 MILLIGRAM(S): at 21:36

## 2019-01-21 RX ADMIN — HALOPERIDOL DECANOATE 5 MILLIGRAM(S): 100 INJECTION INTRAMUSCULAR at 00:50

## 2019-01-21 NOTE — CHART NOTE - NSCHARTNOTEFT_GEN_A_CORE
Upon Nutritional Assessment by the Registered Dietitian your patient was determined to meet criteria / has evidence of the following diagnosis/diagnoses:          [ ]  Mild Protein Calorie Malnutrition        [ ]  Moderate Protein Calorie Malnutrition        [ x] Severe Protein Calorie Malnutrition; acute         [ ] Unspecified Protein Calorie Malnutrition        [ ] Underweight / BMI <19        [ ] Morbid Obesity / BMI > 40      Findings as based on:  •  Comprehensive nutrition assessment and consultation  •  Calorie counts (nutrient intake analysis)  •  Food acceptance and intake status from observations by staff  •  Follow up  •  Patient education  •  Intervention secondary to interdisciplinary rounds  •   concerns      Treatment:    The following diet has been recommended:  If medically feasible, recommend NGT feeding of Vital AF 1.2 @ 30 ml/hr and advance as tolerated to 60 ml/ym=5791 calories, 108 grams protein, 1167 ml free water, 121% RDIs     PROVIDER Section:     By signing this assessment you are acknowledging and agree with the diagnosis/diagnoses assigned by the Registered Dietitian    Comments:

## 2019-01-21 NOTE — PROGRESS NOTE ADULT - SUBJECTIVE AND OBJECTIVE BOX
HPI:  Pt is a 52 y/o male w/PMH gerd, HLD, etoh abuse presents with complaint of tremors, abdominal pain, nausea, and body aches.  States drinks whiskey regularly cant quantify amount, about 4 days ago started to drink alot more than usual as was w/friends, and states started to get abdominal pains and unable to keep anything down over the last 2 days, last drink 2 days, ago, first reports was just throwing up food material then dark coffee ground like. no melena or brbpr.    pt denies any fever, chills, sob, cp, palpitations, +n/+v/-d/-c no travels or sick cotnacts. (12 Jan 2019 00:23)      24 hr events:    ## ROS:  [ ] unable to obtain  CONSTITUTIONAL: No fever, weight loss, or fatigue  EYES: No eye pain, visual disturbances, or discharge  ENMT:  No difficulty hearing, tinnitus, vertigo; No sinus or throat pain  NECK: No pain or stiffness  RESPIRATORY: No cough, wheezing, chills or hemoptysis; No shortness of breath  CARDIOVASCULAR: No chest pain, palpitations, dizziness, or leg swelling  GASTROINTESTINAL: No abdominal or epigastric pain. No nausea, vomiting, or hematemesis; No diarrhea or constipation. No melena or hematochezia.  GENITOURINARY: No dysuria, frequency, hematuria, or incontinence  NEUROLOGICAL: No headaches, memory loss, loss of strength, numbness, or tremors  SKIN: No itching, burning, rashes, or lesions   LYMPH NODES: No enlarged glands  ENDOCRINE: No heat or cold intolerance; No hair loss  MUSCULOSKELETAL: No joint pain or swelling; No muscle, back, or extremity pain  PSYCHIATRIC: No depression, anxiety, mood swings, or difficulty sleeping  HEME/LYMPH: No easy bruising, or bleeding gums  ALLERGY AND IMMUNOLOGIC: No hives or eczema    ## Vitals  ICU Vital Signs Last 24 Hrs  T(C): 36.7 (21 Jan 2019 05:51), Max: 37.7 (20 Jan 2019 23:17)  T(F): 98.1 (21 Jan 2019 05:51), Max: 99.9 (20 Jan 2019 23:17)  HR: 88 (21 Jan 2019 07:30) (80 - 107)  BP: 100/71 (21 Jan 2019 07:30) (97/56 - 161/88)  BP(mean): 77 (21 Jan 2019 07:30) (64 - 117)  ABP: --  ABP(mean): --  RR: 9 (21 Jan 2019 07:30) (9 - 32)  SpO2: 100% (21 Jan 2019 07:30) (96% - 100%)      ## Physical Exam:  Gen:  HEENT:  Resp:  CV:  Abd:  Ext:  Neuro:    ## Vent Data      ## Labs:  Chem:  01-21    140  |  107  |  5<L>  ----------------------------<  105<H>  3.7   |  24  |  0.71    Ca    8.7      21 Jan 2019 03:47  Phos  2.7     01-21  Mg     2.1     01-21        CBC:                        15.2   3.82  )-----------( 166      ( 21 Jan 2019 03:47 )             49.3     Coags:          ## Cardiac        ## Blood Gas      #I/Os  I&O's Detail    20 Jan 2019 07:01  -  21 Jan 2019 07:00  --------------------------------------------------------  IN:    dextrose 5% + lactated ringers.: 1875 mL    IV PiggyBack: 350 mL    Lactated Ringers IV Bolus: 1000 mL    Solution: 500 mL  Total IN: 3725 mL    OUT:    Indwelling Catheter - Urethral: 1385 mL  Total OUT: 1385 mL    Total NET: 2340 mL          ## Imaging:    ## Medications:  MEDICATIONS  (STANDING):  cefTRIAXone   IVPB 1 Gram(s) IV Intermittent every 24 hours  cefTRIAXone   IVPB      dextrose 5% + lactated ringers. 1000 milliLiter(s) (75 mL/Hr) IV Continuous <Continuous>  enoxaparin Injectable 40 milliGRAM(s) SubCutaneous every 24 hours  folic acid Injectable 1 milliGRAM(s) IV Push daily  influenza   Vaccine 0.5 milliLiter(s) IntraMuscular once  metroNIDAZOLE  IVPB      metroNIDAZOLE  IVPB 500 milliGRAM(s) IV Intermittent every 8 hours  pantoprazole  Injectable 40 milliGRAM(s) IV Push every 12 hours  PHENobarbital Injectable 130 milliGRAM(s) IV Push every 6 hours  tamsulosin 0.4 milliGRAM(s) Oral at bedtime  thiamine Injectable 100 milliGRAM(s) IV Push daily    MEDICATIONS  (PRN):  acetaminophen  Suppository .. 650 milliGRAM(s) Rectal every 6 hours PRN Temp greater or equal to 38C (100.4F)  haloperidol    Injectable 5 milliGRAM(s) IntraMuscular every 6 hours PRN agitation  ondansetron Injectable 4 milliGRAM(s) IV Push every 6 hours PRN Nausea and/or Vomiting  PHENobarbital Injectable 65 milliGRAM(s) IV Push every 15 minutes PRN ciwa >8  PHENobarbital Injectable 130 milliGRAM(s) IV Push every 15 minutes PRN ciwa >20 HPI:  Pt is a 52 y/o male w/PMH gerd, HLD, etoh abuse presents with complaint of tremors, abdominal pain, nausea, and body aches.  States drinks whiskey regularly cant quantify amount, about 4 days ago started to drink alot more than usual as was w/friends, and states started to get abdominal pains and unable to keep anything down over the last 2 days, last drink 2 days, ago, first reports was just throwing up food material then dark coffee ground like. no melena or brbpr.    pt denies any fever, chills, sob, cp, palpitations, +n/+v/-d/-c no travels or sick cotnacts. (12 Jan 2019 00:23)      24 hr events: No acute events. Remains sedated. Continues to require frequent suctioning. Unable to provide history.    ## ROS:  [ x] unable to obtain    ## Vitals  ICU Vital Signs Last 24 Hrs  T(C): 36.7 (21 Jan 2019 05:51), Max: 37.7 (20 Jan 2019 23:17)  T(F): 98.1 (21 Jan 2019 05:51), Max: 99.9 (20 Jan 2019 23:17)  HR: 88 (21 Jan 2019 07:30) (80 - 107)  BP: 100/71 (21 Jan 2019 07:30) (97/56 - 161/88)  BP(mean): 77 (21 Jan 2019 07:30) (64 - 117)  ABP: --  ABP(mean): --  RR: 9 (21 Jan 2019 07:30) (9 - 32)  SpO2: 100% (21 Jan 2019 07:30) (96% - 100%)      ## Physical Exam:  Gen: Adult male lying in bed, NAD. Sedated, unarousable.  HEENT: NC/AT, sclerae anicteric  Resp: No increased WOB, ronchorous breath sounds b/l  CV: S1, S2  Abd: Soft + BS  Ext: No edema  Neuro: Sedated, does not follow commands    ## Vent Data      ## Labs:  Chem:  01-21    140  |  107  |  5<L>  ----------------------------<  105<H>  3.7   |  24  |  0.71    Ca    8.7      21 Jan 2019 03:47  Phos  2.7     01-21  Mg     2.1     01-21        CBC:                        15.2   3.82  )-----------( 166      ( 21 Jan 2019 03:47 )             49.3     Coags:          ## Cardiac        ## Blood Gas      #I/Os  I&O's Detail    20 Jan 2019 07:01  -  21 Jan 2019 07:00  --------------------------------------------------------  IN:    dextrose 5% + lactated ringers.: 1875 mL    IV PiggyBack: 350 mL    Lactated Ringers IV Bolus: 1000 mL    Solution: 500 mL  Total IN: 3725 mL    OUT:    Indwelling Catheter - Urethral: 1385 mL  Total OUT: 1385 mL    Total NET: 2340 mL          ## Imaging:    ## Medications:  MEDICATIONS  (STANDING):  cefTRIAXone   IVPB 1 Gram(s) IV Intermittent every 24 hours  cefTRIAXone   IVPB      dextrose 5% + lactated ringers. 1000 milliLiter(s) (75 mL/Hr) IV Continuous <Continuous>  enoxaparin Injectable 40 milliGRAM(s) SubCutaneous every 24 hours  folic acid Injectable 1 milliGRAM(s) IV Push daily  influenza   Vaccine 0.5 milliLiter(s) IntraMuscular once  metroNIDAZOLE  IVPB      metroNIDAZOLE  IVPB 500 milliGRAM(s) IV Intermittent every 8 hours  pantoprazole  Injectable 40 milliGRAM(s) IV Push every 12 hours  PHENobarbital Injectable 130 milliGRAM(s) IV Push every 6 hours  tamsulosin 0.4 milliGRAM(s) Oral at bedtime  thiamine Injectable 100 milliGRAM(s) IV Push daily    MEDICATIONS  (PRN):  acetaminophen  Suppository .. 650 milliGRAM(s) Rectal every 6 hours PRN Temp greater or equal to 38C (100.4F)  haloperidol    Injectable 5 milliGRAM(s) IntraMuscular every 6 hours PRN agitation  ondansetron Injectable 4 milliGRAM(s) IV Push every 6 hours PRN Nausea and/or Vomiting  PHENobarbital Injectable 65 milliGRAM(s) IV Push every 15 minutes PRN ciwa >8  PHENobarbital Injectable 130 milliGRAM(s) IV Push every 15 minutes PRN ciwa >20

## 2019-01-21 NOTE — CHART NOTE - NSCHARTNOTEFT_GEN_A_CORE
Assessment:   Pt c alcohol withdrawal c behavioral disturbances, possible aspiration pneumonia.    Factors impacting intake: [ ] none [ ] nausea  [ ] vomiting [ ] diarrhea [ ] constipation  [ ]chewing problems [ x] swallowing issues  [x ] other: lethargy, AMS, sedation     Diet Prescription: Diet, NPO (diet discontinued 01/16)    Intake: 0%    Current Weight: 01/21, 76.5 kg, 01/11, 77.4 kg, c wt. loss of 0.9 kg  % Weight Change: 1.16%  1+ generalized edema & 2+ edema of arms noted  Pt is not appropriate candidate for nutrition focused physical exam due to edema & mental status/behavioral disturbances, visible edema of arms noted.    Pertinent Medications: MEDICATIONS  (STANDING):  cefTRIAXone   IVPB 1 Gram(s) IV Intermittent every 24 hours  cefTRIAXone   IVPB      dextrose 5% + lactated ringers. 1000 milliLiter(s) (75 mL/Hr) IV Continuous <Continuous>  enoxaparin Injectable 40 milliGRAM(s) SubCutaneous every 24 hours  folic acid Injectable 1 milliGRAM(s) IV Push daily  influenza   Vaccine 0.5 milliLiter(s) IntraMuscular once  metroNIDAZOLE  IVPB      metroNIDAZOLE  IVPB 500 milliGRAM(s) IV Intermittent every 8 hours  pantoprazole  Injectable 40 milliGRAM(s) IV Push every 12 hours  PHENobarbital Injectable 130 milliGRAM(s) IV Push every 6 hours  tamsulosin 0.4 milliGRAM(s) Oral at bedtime  thiamine Injectable 100 milliGRAM(s) IV Push daily    MEDICATIONS  (PRN):  acetaminophen  Suppository .. 650 milliGRAM(s) Rectal every 6 hours PRN Temp greater or equal to 38C (100.4F)  ondansetron Injectable 4 milliGRAM(s) IV Push every 6 hours PRN Nausea and/or Vomiting  PHENobarbital Injectable 65 milliGRAM(s) IV Push every 15 minutes PRN ciwa >8  PHENobarbital Injectable 130 milliGRAM(s) IV Push every 15 minutes PRN ciwa >20    Pertinent Labs: 01-21 Na140 mmol/L Glu 105 mg/dL<H> K+ 3.7 mmol/L Cr  0.71 mg/dL BUN 5 mg/dL<L> 01-21 Phos 2.7 mg/dL 01-19 Alb 3.0 g/dL<L>     CAPILLARY BLOOD GLUCOSE        Skin: w/o pressure ulcers     Estimated Needs:   [ ] no change since previous assessment  [x ] recalculated: ICU adm; 7011-3957 calories(25-30 Kcal /kg IBW=67.2 kg), 80-94grams protein(1.2-1.4 gram protein/kg IBW),9761-7114 ml(25-30 ml /kg IBW)    Previous Nutrition Diagnosis:   [x] bioactive substance; excess alcohol intake   [ ] Inadequate Energy Intake [ ]Inadequate Oral Intake [ ] Excessive Energy Intake   [ ] Underweight [ ] Increased Nutrient Needs [ ] Overweight/Obesity   [ ] Altered GI Function [ ] Unintended Weight Loss [ ] Food & Nutrition Related Knowledge Deficit [ ] Malnutrition     Nutrition Diagnosis is [ ] ongoing   [ ] resolved [x ] not applicable @ present     New Nutrition Diagnosis:     [ ] Inadequate Energy Intake [ ]Inadequate Oral Intake [ ] Excessive Energy Intake   [ ] Underweight [ ] Increased Nutrient Needs [ ] Overweight/Obesity   [ ] Altered GI Function [ ] Unintended Weight Loss [ ] Food & Nutrition Related Knowledge Deficit [ x] Malnutrition: severe malnutrition in context of acute illness     Related to: inadequate protein-energy intake in setting of alcohol intake     As evidenced by: <50% nutrition needs > 5 days, 2+ edema     Goal: meet >75% nutritional via enteral feeding/ via NGT at present       Interventions:   Recommend  [ ] Change Diet To:  [ ] Nutrition Supplement  [x] Nutrition Support;  if medically feasible, recommend Vital AF 1.2 @ 30 ml/hr and advance as tolerated to 60 ml/rd=1643 calories, 108 grams protein, 1167 ml free water, 121% RDIs via NGT  [ ] Other:    Monitoring and Evaluation:   [ ] PO intake [ x ] Tolerance to diet prescription [ x ] weights [ x ] labs[ x ] follow up per protocol  [ ] other:

## 2019-01-21 NOTE — PROGRESS NOTE ADULT - ASSESSMENT
52 y/o M w/alcohol abuse presenting with alcohol withdrawal with behavioral disturbance requiring high doses of sedatives.    - Continue standing phenobarb 130mg q6hrs   - Continue PRN phenobarb, continues to require PRN dosing  - Haldol PRN if QTc is < 500, otherwise ativan PRN  - MVI, thiamine, folate  - Continue abx for possible aspiration PNA    Attending critical care time 35 minutes

## 2019-01-22 LAB
ALBUMIN SERPL ELPH-MCNC: 2.6 G/DL — LOW (ref 3.3–5)
ALP SERPL-CCNC: 36 U/L — LOW (ref 40–120)
ALT FLD-CCNC: 22 U/L — SIGNIFICANT CHANGE UP (ref 12–78)
ANION GAP SERPL CALC-SCNC: 9 MMOL/L — SIGNIFICANT CHANGE UP (ref 5–17)
AST SERPL-CCNC: 28 U/L — SIGNIFICANT CHANGE UP (ref 15–37)
BASOPHILS # BLD AUTO: 0.07 K/UL — SIGNIFICANT CHANGE UP (ref 0–0.2)
BASOPHILS NFR BLD AUTO: 1.6 % — SIGNIFICANT CHANGE UP (ref 0–2)
BILIRUB SERPL-MCNC: 0.3 MG/DL — SIGNIFICANT CHANGE UP (ref 0.2–1.2)
BUN SERPL-MCNC: 6 MG/DL — LOW (ref 7–23)
CALCIUM SERPL-MCNC: 8.3 MG/DL — LOW (ref 8.5–10.1)
CHLORIDE SERPL-SCNC: 109 MMOL/L — HIGH (ref 96–108)
CO2 SERPL-SCNC: 28 MMOL/L — SIGNIFICANT CHANGE UP (ref 22–31)
CREAT SERPL-MCNC: 0.75 MG/DL — SIGNIFICANT CHANGE UP (ref 0.5–1.3)
EOSINOPHIL # BLD AUTO: 0.22 K/UL — SIGNIFICANT CHANGE UP (ref 0–0.5)
EOSINOPHIL NFR BLD AUTO: 5 % — SIGNIFICANT CHANGE UP (ref 0–6)
GLUCOSE SERPL-MCNC: 89 MG/DL — SIGNIFICANT CHANGE UP (ref 70–99)
HCT VFR BLD CALC: 39.9 % — SIGNIFICANT CHANGE UP (ref 39–50)
HGB BLD-MCNC: 12.7 G/DL — LOW (ref 13–17)
IMM GRANULOCYTES NFR BLD AUTO: 1.6 % — HIGH (ref 0–1.5)
LYMPHOCYTES # BLD AUTO: 1.41 K/UL — SIGNIFICANT CHANGE UP (ref 1–3.3)
LYMPHOCYTES # BLD AUTO: 31.9 % — SIGNIFICANT CHANGE UP (ref 13–44)
MAGNESIUM SERPL-MCNC: 2.1 MG/DL — SIGNIFICANT CHANGE UP (ref 1.6–2.6)
MCHC RBC-ENTMCNC: 28.8 PG — SIGNIFICANT CHANGE UP (ref 27–34)
MCHC RBC-ENTMCNC: 31.8 GM/DL — LOW (ref 32–36)
MCV RBC AUTO: 90.5 FL — SIGNIFICANT CHANGE UP (ref 80–100)
MONOCYTES # BLD AUTO: 0.54 K/UL — SIGNIFICANT CHANGE UP (ref 0–0.9)
MONOCYTES NFR BLD AUTO: 12.2 % — SIGNIFICANT CHANGE UP (ref 2–14)
NEUTROPHILS # BLD AUTO: 2.11 K/UL — SIGNIFICANT CHANGE UP (ref 1.8–7.4)
NEUTROPHILS NFR BLD AUTO: 47.7 % — SIGNIFICANT CHANGE UP (ref 43–77)
NRBC # BLD: 0 /100 WBCS — SIGNIFICANT CHANGE UP (ref 0–0)
PHOSPHATE SERPL-MCNC: 2.3 MG/DL — LOW (ref 2.5–4.5)
PLATELET # BLD AUTO: 395 K/UL — SIGNIFICANT CHANGE UP (ref 150–400)
POTASSIUM SERPL-MCNC: 3.1 MMOL/L — LOW (ref 3.5–5.3)
POTASSIUM SERPL-SCNC: 3.1 MMOL/L — LOW (ref 3.5–5.3)
PROT SERPL-MCNC: 6.8 GM/DL — SIGNIFICANT CHANGE UP (ref 6–8.3)
RBC # BLD: 4.41 M/UL — SIGNIFICANT CHANGE UP (ref 4.2–5.8)
RBC # FLD: 12.6 % — SIGNIFICANT CHANGE UP (ref 10.3–14.5)
SODIUM SERPL-SCNC: 146 MMOL/L — HIGH (ref 135–145)
WBC # BLD: 4.42 K/UL — SIGNIFICANT CHANGE UP (ref 3.8–10.5)
WBC # FLD AUTO: 4.42 K/UL — SIGNIFICANT CHANGE UP (ref 3.8–10.5)

## 2019-01-22 PROCEDURE — 99291 CRITICAL CARE FIRST HOUR: CPT

## 2019-01-22 RX ORDER — SODIUM CHLORIDE 9 MG/ML
1000 INJECTION, SOLUTION INTRAVENOUS
Qty: 0 | Refills: 0 | Status: DISCONTINUED | OUTPATIENT
Start: 2019-01-22 | End: 2019-01-27

## 2019-01-22 RX ORDER — FUROSEMIDE 40 MG
40 TABLET ORAL ONCE
Qty: 0 | Refills: 0 | Status: COMPLETED | OUTPATIENT
Start: 2019-01-22 | End: 2019-01-22

## 2019-01-22 RX ORDER — POTASSIUM CHLORIDE 20 MEQ
10 PACKET (EA) ORAL
Qty: 0 | Refills: 0 | Status: COMPLETED | OUTPATIENT
Start: 2019-01-22 | End: 2019-01-22

## 2019-01-22 RX ORDER — POTASSIUM PHOSPHATE, MONOBASIC POTASSIUM PHOSPHATE, DIBASIC 236; 224 MG/ML; MG/ML
15 INJECTION, SOLUTION INTRAVENOUS ONCE
Qty: 0 | Refills: 0 | Status: COMPLETED | OUTPATIENT
Start: 2019-01-22 | End: 2019-01-22

## 2019-01-22 RX ADMIN — PANTOPRAZOLE SODIUM 40 MILLIGRAM(S): 20 TABLET, DELAYED RELEASE ORAL at 18:10

## 2019-01-22 RX ADMIN — Medication 2 MILLIGRAM(S): at 02:20

## 2019-01-22 RX ADMIN — Medication 100 MILLIGRAM(S): at 13:30

## 2019-01-22 RX ADMIN — Medication 130 MILLIGRAM(S): at 18:10

## 2019-01-22 RX ADMIN — Medication 1 MILLIGRAM(S): at 13:25

## 2019-01-22 RX ADMIN — Medication 100 MILLIEQUIVALENT(S): at 06:06

## 2019-01-22 RX ADMIN — Medication 65 MILLIGRAM(S): at 01:20

## 2019-01-22 RX ADMIN — ENOXAPARIN SODIUM 40 MILLIGRAM(S): 100 INJECTION SUBCUTANEOUS at 18:11

## 2019-01-22 RX ADMIN — Medication 100 MILLIEQUIVALENT(S): at 08:36

## 2019-01-22 RX ADMIN — Medication 100 MILLIGRAM(S): at 22:14

## 2019-01-22 RX ADMIN — CEFTRIAXONE 100 GRAM(S): 500 INJECTION, POWDER, FOR SOLUTION INTRAMUSCULAR; INTRAVENOUS at 12:03

## 2019-01-22 RX ADMIN — Medication 100 MILLIEQUIVALENT(S): at 07:00

## 2019-01-22 RX ADMIN — POTASSIUM PHOSPHATE, MONOBASIC POTASSIUM PHOSPHATE, DIBASIC 63.75 MILLIMOLE(S): 236; 224 INJECTION, SOLUTION INTRAVENOUS at 09:30

## 2019-01-22 RX ADMIN — Medication 130 MILLIGRAM(S): at 12:02

## 2019-01-22 RX ADMIN — PANTOPRAZOLE SODIUM 40 MILLIGRAM(S): 20 TABLET, DELAYED RELEASE ORAL at 05:57

## 2019-01-22 RX ADMIN — SODIUM CHLORIDE 50 MILLILITER(S): 9 INJECTION, SOLUTION INTRAVENOUS at 08:41

## 2019-01-22 RX ADMIN — Medication 40 MILLIGRAM(S): at 10:50

## 2019-01-22 RX ADMIN — Medication 100 MILLIGRAM(S): at 12:02

## 2019-01-22 RX ADMIN — SODIUM CHLORIDE 75 MILLILITER(S): 9 INJECTION, SOLUTION INTRAVENOUS at 04:30

## 2019-01-22 RX ADMIN — Medication 100 MILLIGRAM(S): at 05:57

## 2019-01-22 RX ADMIN — Medication 130 MILLIGRAM(S): at 00:35

## 2019-01-22 NOTE — PROGRESS NOTE ADULT - ASSESSMENT
54 y/o M w/alcohol abuse presenting with alcohol withdrawal with behavioral disturbance requiring high doses of sedatives.    - Continue standing phenobarb 130mg q6hrs   - Continue PRN phenobarb, continues to require PRN dosing  - Haldol PRN if QTc is < 500, otherwise ativan PRN  - MVI, thiamine, folate  - Continue abx for possible aspiration PNA    Attending critical care time 35 minutes

## 2019-01-22 NOTE — PROGRESS NOTE ADULT - SUBJECTIVE AND OBJECTIVE BOX
HPI:  Pt is a 52 y/o male w/PMH gerd, HLD, etoh abuse presents with complaint of tremors, abdominal pain, nausea, and body aches.  States drinks whiskey regularly cant quantify amount, about 4 days ago started to drink alot more than usual as was w/friends, and states started to get abdominal pains and unable to keep anything down over the last 2 days, last drink 2 days, ago, first reports was just throwing up food material then dark coffee ground like. no melena or brbpr.    pt denies any fever, chills, sob, cp, palpitations, +n/+v/-d/-c no travels or sick cotnacts. (12 Jan 2019 00:23)      24 hr events: Continues to require PRN dosing of meds for agitation. Continues to have significant secretions requiring frequent suctioning. Borderline febrile.    ## ROS:  [x ] unable to obtain      ## Vitals  ICU Vital Signs Last 24 Hrs  T(C): 37 (22 Jan 2019 08:00), Max: 37.9 (21 Jan 2019 19:17)  T(F): 98.6 (22 Jan 2019 08:00), Max: 100.3 (21 Jan 2019 19:17)  HR: 89 (22 Jan 2019 10:00) (83 - 106)  BP: 160/90 (22 Jan 2019 10:00) (38/23 - 163/89)  BP(mean): 106 (22 Jan 2019 10:00) (26 - 107)  ABP: --  ABP(mean): --  RR: 13 (22 Jan 2019 10:00) (9 - 25)  SpO2: 100% (22 Jan 2019 09:00) (96% - 100%)      ## Physical Exam:  Gen: Adult male lying in bed, NAD. Sedated, arousable to touch  HEENT: NC/AT, sclerae anicteric  Resp: No increased WOB, ronchorous breath sounds b/l  CV: S1, S2  Abd: Soft + BS  Ext: No edema  Neuro: Sedated, does not follow commands    ## Vent Data      ## Labs:  Chem:  01-22    146<H>  |  109<H>  |  6<L>  ----------------------------<  89  3.1<L>   |  28  |  0.75    Ca    8.3<L>      22 Jan 2019 04:15  Phos  2.3     01-22  Mg     2.1     01-22    TPro  6.8  /  Alb  2.6<L>  /  TBili  0.3  /  DBili  x   /  AST  28  /  ALT  22  /  AlkPhos  36<L>  01-22    LIVER FUNCTIONS - ( 22 Jan 2019 04:15 )  Alb: 2.6 g/dL / Pro: 6.8 gm/dL / ALK PHOS: 36 U/L / ALT: 22 U/L / AST: 28 U/L / GGT: x           CBC:                        12.7   4.42  )-----------( 395      ( 22 Jan 2019 04:15 )             39.9     Coags:          ## Cardiac        ## Blood Gas      #I/Os  I&O's Detail    21 Jan 2019 07:01  -  22 Jan 2019 07:00  --------------------------------------------------------  IN:    dextrose 5% + lactated ringers.: 1800 mL    IV PiggyBack: 350 mL    Solution: 200 mL  Total IN: 2350 mL    OUT:    Indwelling Catheter - Urethral: 660 mL  Total OUT: 660 mL    Total NET: 1690 mL      22 Jan 2019 07:01  -  22 Jan 2019 10:26  --------------------------------------------------------  IN:    dextrose 5%: 150 mL    IV PiggyBack: 250 mL    Solution: 100 mL  Total IN: 500 mL    OUT:    Indwelling Catheter - Urethral: 275 mL  Total OUT: 275 mL    Total NET: 225 mL          ## Imaging:    ## Medications:  MEDICATIONS  (STANDING):  cefTRIAXone   IVPB 1 Gram(s) IV Intermittent every 24 hours  cefTRIAXone   IVPB      dextrose 5% 1000 milliLiter(s) (50 mL/Hr) IV Continuous <Continuous>  enoxaparin Injectable 40 milliGRAM(s) SubCutaneous every 24 hours  folic acid Injectable 1 milliGRAM(s) IV Push daily  furosemide   Injectable 40 milliGRAM(s) IV Push once  influenza   Vaccine 0.5 milliLiter(s) IntraMuscular once  metroNIDAZOLE  IVPB 500 milliGRAM(s) IV Intermittent every 8 hours  metroNIDAZOLE  IVPB      pantoprazole  Injectable 40 milliGRAM(s) IV Push every 12 hours  PHENobarbital Injectable 130 milliGRAM(s) IV Push every 6 hours  potassium phosphate IVPB 15 milliMole(s) IV Intermittent once  tamsulosin 0.4 milliGRAM(s) Oral at bedtime  thiamine Injectable 100 milliGRAM(s) IV Push daily    MEDICATIONS  (PRN):  acetaminophen  Suppository .. 650 milliGRAM(s) Rectal every 6 hours PRN Temp greater or equal to 38C (100.4F)  ondansetron Injectable 4 milliGRAM(s) IV Push every 6 hours PRN Nausea and/or Vomiting  PHENobarbital Injectable 65 milliGRAM(s) IV Push every 15 minutes PRN ciwa >8  PHENobarbital Injectable 130 milliGRAM(s) IV Push every 15 minutes PRN ciwa >20

## 2019-01-23 LAB
ANION GAP SERPL CALC-SCNC: 10 MMOL/L — SIGNIFICANT CHANGE UP (ref 5–17)
BUN SERPL-MCNC: 6 MG/DL — LOW (ref 7–23)
CALCIUM SERPL-MCNC: 8.4 MG/DL — LOW (ref 8.5–10.1)
CHLORIDE SERPL-SCNC: 106 MMOL/L — SIGNIFICANT CHANGE UP (ref 96–108)
CO2 SERPL-SCNC: 27 MMOL/L — SIGNIFICANT CHANGE UP (ref 22–31)
CREAT SERPL-MCNC: 0.71 MG/DL — SIGNIFICANT CHANGE UP (ref 0.5–1.3)
GLUCOSE SERPL-MCNC: 96 MG/DL — SIGNIFICANT CHANGE UP (ref 70–99)
GRAM STN FLD: SIGNIFICANT CHANGE UP
HCT VFR BLD CALC: 42.6 % — SIGNIFICANT CHANGE UP (ref 39–50)
HGB BLD-MCNC: 13.8 G/DL — SIGNIFICANT CHANGE UP (ref 13–17)
MAGNESIUM SERPL-MCNC: 2 MG/DL — SIGNIFICANT CHANGE UP (ref 1.6–2.6)
MCHC RBC-ENTMCNC: 28.8 PG — SIGNIFICANT CHANGE UP (ref 27–34)
MCHC RBC-ENTMCNC: 32.4 GM/DL — SIGNIFICANT CHANGE UP (ref 32–36)
MCV RBC AUTO: 88.8 FL — SIGNIFICANT CHANGE UP (ref 80–100)
NRBC # BLD: 0 /100 WBCS — SIGNIFICANT CHANGE UP (ref 0–0)
PHOSPHATE SERPL-MCNC: 2.3 MG/DL — LOW (ref 2.5–4.5)
PLATELET # BLD AUTO: 438 K/UL — HIGH (ref 150–400)
POTASSIUM SERPL-MCNC: 3.3 MMOL/L — LOW (ref 3.5–5.3)
POTASSIUM SERPL-SCNC: 3.3 MMOL/L — LOW (ref 3.5–5.3)
RBC # BLD: 4.8 M/UL — SIGNIFICANT CHANGE UP (ref 4.2–5.8)
RBC # FLD: 12.6 % — SIGNIFICANT CHANGE UP (ref 10.3–14.5)
SODIUM SERPL-SCNC: 143 MMOL/L — SIGNIFICANT CHANGE UP (ref 135–145)
SPECIMEN SOURCE: SIGNIFICANT CHANGE UP
WBC # BLD: 4.72 K/UL — SIGNIFICANT CHANGE UP (ref 3.8–10.5)
WBC # FLD AUTO: 4.72 K/UL — SIGNIFICANT CHANGE UP (ref 3.8–10.5)

## 2019-01-23 PROCEDURE — 71045 X-RAY EXAM CHEST 1 VIEW: CPT | Mod: 26,76

## 2019-01-23 PROCEDURE — 99291 CRITICAL CARE FIRST HOUR: CPT

## 2019-01-23 RX ORDER — PIPERACILLIN AND TAZOBACTAM 4; .5 G/20ML; G/20ML
3.38 INJECTION, POWDER, LYOPHILIZED, FOR SOLUTION INTRAVENOUS EVERY 8 HOURS
Qty: 0 | Refills: 0 | Status: DISCONTINUED | OUTPATIENT
Start: 2019-01-23 | End: 2019-01-25

## 2019-01-23 RX ORDER — FUROSEMIDE 40 MG
40 TABLET ORAL ONCE
Qty: 0 | Refills: 0 | Status: COMPLETED | OUTPATIENT
Start: 2019-01-23 | End: 2019-01-23

## 2019-01-23 RX ORDER — DOXAZOSIN MESYLATE 4 MG
1 TABLET ORAL AT BEDTIME
Qty: 0 | Refills: 0 | Status: DISCONTINUED | OUTPATIENT
Start: 2019-01-23 | End: 2019-01-31

## 2019-01-23 RX ORDER — PHENOBARBITAL 60 MG
130 TABLET ORAL ONCE
Qty: 0 | Refills: 0 | Status: DISCONTINUED | OUTPATIENT
Start: 2019-01-23 | End: 2019-01-23

## 2019-01-23 RX ORDER — POTASSIUM CHLORIDE 20 MEQ
10 PACKET (EA) ORAL
Qty: 0 | Refills: 0 | Status: COMPLETED | OUTPATIENT
Start: 2019-01-23 | End: 2019-01-23

## 2019-01-23 RX ORDER — SODIUM,POTASSIUM PHOSPHATES 278-250MG
1 POWDER IN PACKET (EA) ORAL
Qty: 0 | Refills: 0 | Status: DISCONTINUED | OUTPATIENT
Start: 2019-01-23 | End: 2019-01-23

## 2019-01-23 RX ORDER — POTASSIUM PHOSPHATE, MONOBASIC POTASSIUM PHOSPHATE, DIBASIC 236; 224 MG/ML; MG/ML
15 INJECTION, SOLUTION INTRAVENOUS ONCE
Qty: 0 | Refills: 0 | Status: COMPLETED | OUTPATIENT
Start: 2019-01-23 | End: 2019-01-23

## 2019-01-23 RX ORDER — SCOPALAMINE 1 MG/3D
1 PATCH, EXTENDED RELEASE TRANSDERMAL
Qty: 0 | Refills: 0 | Status: DISCONTINUED | OUTPATIENT
Start: 2019-01-23 | End: 2019-01-31

## 2019-01-23 RX ORDER — CHLORHEXIDINE GLUCONATE 213 G/1000ML
1 SOLUTION TOPICAL
Qty: 0 | Refills: 0 | Status: DISCONTINUED | OUTPATIENT
Start: 2019-01-23 | End: 2019-01-29

## 2019-01-23 RX ADMIN — PANTOPRAZOLE SODIUM 40 MILLIGRAM(S): 20 TABLET, DELAYED RELEASE ORAL at 06:19

## 2019-01-23 RX ADMIN — Medication 130 MILLIGRAM(S): at 00:42

## 2019-01-23 RX ADMIN — Medication 100 MILLIGRAM(S): at 11:04

## 2019-01-23 RX ADMIN — Medication 130 MILLIGRAM(S): at 23:53

## 2019-01-23 RX ADMIN — Medication 130 MILLIGRAM(S): at 06:19

## 2019-01-23 RX ADMIN — ENOXAPARIN SODIUM 40 MILLIGRAM(S): 100 INJECTION SUBCUTANEOUS at 17:42

## 2019-01-23 RX ADMIN — Medication 40 MILLIGRAM(S): at 08:57

## 2019-01-23 RX ADMIN — Medication 130 MILLIGRAM(S): at 12:12

## 2019-01-23 RX ADMIN — Medication 100 MILLIEQUIVALENT(S): at 07:34

## 2019-01-23 RX ADMIN — Medication 65 MILLIGRAM(S): at 20:30

## 2019-01-23 RX ADMIN — Medication 65 MILLIGRAM(S): at 08:07

## 2019-01-23 RX ADMIN — Medication 1 MILLIGRAM(S): at 23:28

## 2019-01-23 RX ADMIN — Medication 130 MILLIGRAM(S): at 17:05

## 2019-01-23 RX ADMIN — CHLORHEXIDINE GLUCONATE 1 APPLICATION(S): 213 SOLUTION TOPICAL at 11:04

## 2019-01-23 RX ADMIN — SCOPALAMINE 1 PATCH: 1 PATCH, EXTENDED RELEASE TRANSDERMAL at 23:48

## 2019-01-23 RX ADMIN — Medication 65 MILLIGRAM(S): at 16:30

## 2019-01-23 RX ADMIN — Medication 1 MILLIGRAM(S): at 12:12

## 2019-01-23 RX ADMIN — POTASSIUM PHOSPHATE, MONOBASIC POTASSIUM PHOSPHATE, DIBASIC 63.75 MILLIMOLE(S): 236; 224 INJECTION, SOLUTION INTRAVENOUS at 07:29

## 2019-01-23 RX ADMIN — Medication 130 MILLIGRAM(S): at 03:04

## 2019-01-23 RX ADMIN — PIPERACILLIN AND TAZOBACTAM 25 GRAM(S): 4; .5 INJECTION, POWDER, LYOPHILIZED, FOR SOLUTION INTRAVENOUS at 16:44

## 2019-01-23 RX ADMIN — Medication 100 MILLIGRAM(S): at 06:19

## 2019-01-23 RX ADMIN — PIPERACILLIN AND TAZOBACTAM 25 GRAM(S): 4; .5 INJECTION, POWDER, LYOPHILIZED, FOR SOLUTION INTRAVENOUS at 23:00

## 2019-01-23 RX ADMIN — Medication 100 MILLIEQUIVALENT(S): at 06:20

## 2019-01-23 RX ADMIN — SODIUM CHLORIDE 50 MILLILITER(S): 9 INJECTION, SOLUTION INTRAVENOUS at 22:56

## 2019-01-23 RX ADMIN — PANTOPRAZOLE SODIUM 40 MILLIGRAM(S): 20 TABLET, DELAYED RELEASE ORAL at 17:05

## 2019-01-23 NOTE — CHART NOTE - NSCHARTNOTEFT_GEN_A_CORE
Assessment: Pt seen for follow-up continues Acute severe malnutrition. Pt with hx ETOHA & alcohol withdrawal with behavioral disturbance requiring high doses of sedatives.     Factors impacting intake: [ ] none [ ] nausea  [ ] vomiting [ ] diarrhea [ ] constipation  [ ]chewing problems [ ] swallowing issues  [x ] other: lethargic from high doses of sedatives    Diet Prescription: Diet, NPO:   Except Medications (01-22-19 @ 09:42)    Intake: Pt NPO x 7 days due to behavioral disturbance requiring high doses of sedatives    Current Weight:  Wt=77.7kg(1/22/19), Wt=77.4kg(1/11)  % Weight Change Wt remains stable x 11 days    Physical appearance: +3 edema Valentin arms & +1 generalized edema; Nutrition focused physical exam limited due to mental status & behaviorl disturbance; Subcutaneous fat loss: [WNL ] Orbital fat pads region,   [WNL ]Buccal fat region, [ Edematous]Triceps region,  [unable ]Ribs region.  Muscle wasting: [WNL ]Temples region, [WNL ]Clavicle region, [WNL ]Shoulder region, [ unable]Scapula region, [edematous ]Interosseous region,  [unable ]thigh region, [unable ]Calf region    Pertinent Medications: MEDICATIONS  (STANDING):  chlorhexidine 4% Liquid 1 Application(s) Topical <User Schedule>  dextrose 5% 1000 milliLiter(s) (50 mL/Hr) IV Continuous <Continuous>  enoxaparin Injectable 40 milliGRAM(s) SubCutaneous every 24 hours  folic acid Injectable 1 milliGRAM(s) IV Push daily  influenza   Vaccine 0.5 milliLiter(s) IntraMuscular once  pantoprazole  Injectable 40 milliGRAM(s) IV Push every 12 hours  PHENobarbital Injectable 130 milliGRAM(s) IV Push every 6 hours  piperacillin/tazobactam IVPB. 3.375 Gram(s) IV Intermittent every 8 hours  tamsulosin 0.4 milliGRAM(s) Oral at bedtime  thiamine Injectable 100 milliGRAM(s) IV Push daily    MEDICATIONS  (PRN):  acetaminophen  Suppository .. 650 milliGRAM(s) Rectal every 6 hours PRN Temp greater or equal to 38C (100.4F)  ondansetron Injectable 4 milliGRAM(s) IV Push every 6 hours PRN Nausea and/or Vomiting  PHENobarbital Injectable 65 milliGRAM(s) IV Push every 15 minutes PRN ciwa >8  PHENobarbital Injectable 130 milliGRAM(s) IV Push every 15 minutes PRN ciwa >20    Pertinent Labs: 01-23 Na 143 mmol/L Glu 96 mg/dL K+ 3.3 mmol/L<L> Cr 0.71 mg/dL BUN 6 mg/dL<L> Phos 2.3 mg/dL<L> Alb n/a   PAB n/a   Hgb 13.8 g/dL Hct 42.6 % HgA1C n/a    Glucose, Serum: 96 mg/dL   24Hr FS:  Skin: No pressure ulcers; skin ter Rt forearm    Estimated Needs:   [ x] no change since previous assessment (1/21/19)  [ ] recalculated:     Previous Nutrition Diagnosis:   [ ] Inadequate Energy Intake [ ]Inadequate Oral Intake [ ] Excessive Energy Intake   [ ] Underweight [ ] Increased Nutrient Needs [ ] Overweight/Obesity   [ ] Altered GI Function [ ] Unintended Weight Loss [ ] Food & Nutrition Related Knowledge Deficit [x ] Acute severe Malnutrition [ ] moderate malnutrition (based on +3 edema valentin arms & NPO x 7 days)    Nutrition Diagnosis is [x ] ongoing  [ ] resolved  [ ] improved  [ ] not applicable   Previous Goal: >75% nutritional needs via enteral feeding via NGT @ present; Not applicable    New Nutrition Diagnosis: [ x] not applicable       Interventions:   Recommend  [ ] Continue:  [x ] Change Diet To: Adv po diet when medically feasible  [x ] Nutrition Supplement: Consider NGT feeding if unable to resume po feeding start Vital AF 1.2 @ 30ml./hr to goal rate 60ml/re=0564vw, 1728kcal & 108gm protein & 121% RDA vitamins & minerals  [ ] Nutrition Support:  [ ] Other:     Monitoring and Evaluation:   [ ] PO intake [ x ] Tolerance to diet prescription [ x ] weights [ x ] labs[ x ] follow up per protocol  [ ] other: Assessment: Pt seen for follow-up continues Acute severe malnutrition. Pt with hx ETOHA & alcohol withdrawal with behavioral disturbance requiring high doses of sedatives.     Factors impacting intake: [ ] none [ ] nausea  [ ] vomiting [ ] diarrhea [ ] constipation  [ ]chewing problems [ ] swallowing issues  [x ] other: lethargic from high doses of sedatives    Diet Prescription: Diet, NPO:   Except Medications (01-16-19 @ 09:42)    Intake: Pt NPO x 7 days due to behavioral disturbance requiring high doses of sedatives. (po diet d/c'd 1/16)    Current Weight:  Wt=77.7kg(1/22/19), Wt=77.4kg(1/11)  % Weight Change Wt remains stable x 11 days    Physical appearance: +3 edema Valentin arms & +1 generalized edema; Nutrition focused physical exam limited due to mental status & behaviorl disturbance; Subcutaneous fat loss: [WNL ] Orbital fat pads region,   [WNL ]Buccal fat region, [ Edematous]Triceps region,  [unable ]Ribs region.  Muscle wasting: [WNL ]Temples region, [WNL ]Clavicle region, [WNL ]Shoulder region, [ unable]Scapula region, [edematous ]Interosseous region,  [unable ]thigh region, [unable ]Calf region    Pertinent Medications: MEDICATIONS  (STANDING):  chlorhexidine 4% Liquid 1 Application(s) Topical <User Schedule>  dextrose 5% 1000 milliLiter(s) (50 mL/Hr) IV Continuous <Continuous>  enoxaparin Injectable 40 milliGRAM(s) SubCutaneous every 24 hours  folic acid Injectable 1 milliGRAM(s) IV Push daily  influenza   Vaccine 0.5 milliLiter(s) IntraMuscular once  pantoprazole  Injectable 40 milliGRAM(s) IV Push every 12 hours  PHENobarbital Injectable 130 milliGRAM(s) IV Push every 6 hours  piperacillin/tazobactam IVPB. 3.375 Gram(s) IV Intermittent every 8 hours  tamsulosin 0.4 milliGRAM(s) Oral at bedtime  thiamine Injectable 100 milliGRAM(s) IV Push daily    MEDICATIONS  (PRN):  acetaminophen  Suppository .. 650 milliGRAM(s) Rectal every 6 hours PRN Temp greater or equal to 38C (100.4F)  ondansetron Injectable 4 milliGRAM(s) IV Push every 6 hours PRN Nausea and/or Vomiting  PHENobarbital Injectable 65 milliGRAM(s) IV Push every 15 minutes PRN ciwa >8  PHENobarbital Injectable 130 milliGRAM(s) IV Push every 15 minutes PRN ciwa >20    Pertinent Labs: 01-23 Na 143 mmol/L Glu 96 mg/dL K+ 3.3 mmol/L<L> Cr 0.71 mg/dL BUN 6 mg/dL<L> Phos 2.3 mg/dL<L> Alb n/a   PAB n/a   Hgb 13.8 g/dL Hct 42.6 % HgA1C n/a    Glucose, Serum: 96 mg/dL   24Hr FS:  Skin: No pressure ulcers; skin ter Rt forearm    Estimated Needs:   [ x] no change since previous assessment (1/21/19)  [ ] recalculated:     Previous Nutrition Diagnosis:   [ ] Inadequate Energy Intake [ ]Inadequate Oral Intake [ ] Excessive Energy Intake   [ ] Underweight [ ] Increased Nutrient Needs [ ] Overweight/Obesity   [ ] Altered GI Function [ ] Unintended Weight Loss [ ] Food & Nutrition Related Knowledge Deficit [x ] Acute severe Malnutrition [ ] moderate malnutrition (based on +3 edema valentin arms & NPO x 7 days)    Nutrition Diagnosis is [x ] ongoing  [ ] resolved  [ ] improved  [ ] not applicable   Previous Goal: >75% nutritional needs via enteral feeding via NGT @ present; Not applicable    New Nutrition Diagnosis: [ x] not applicable       Interventions:   Recommend  [ ] Continue:  [x ] Change Diet To: Adv po diet when medically feasible  [x ] Nutrition Supplement: Consider NGT feeding if unable to resume po feeding start Vital AF 1.2 @ 30ml./hr to goal rate 60ml/oc=6258cs, 1728kcal & 108gm protein & 121% RDA vitamins & minerals  [ ] Nutrition Support:  [ ] Other:     Monitoring and Evaluation:   [ ] PO intake [ x ] Tolerance to diet prescription [ x ] weights [ x ] labs[ x ] follow up per protocol  [ ] other:

## 2019-01-23 NOTE — PROGRESS NOTE ADULT - SUBJECTIVE AND OBJECTIVE BOX
HPI:  Pt is a 54 y/o male w/PMH gerd, HLD, etoh abuse presents with complaint of tremors, abdominal pain, nausea, and body aches.  States drinks whiskey regularly cant quantify amount, about 4 days ago started to drink alot more than usual as was w/friends, and states started to get abdominal pains and unable to keep anything down over the last 2 days, last drink 2 days, ago, first reports was just throwing up food material then dark coffee ground like. no melena or brbpr.    pt denies any fever, chills, sob, cp, palpitations, +n/+v/-d/-c no travels or sick cotnacts. (12 Jan 2019 00:23)      24 hr events: Continues to require PRN dosing of sedation. Continues to have hallucinations. Continues to require frequent suctioning.    ## ROS:  [x ] unable to obtain      ## Vitals  ICU Vital Signs Last 24 Hrs  T(C): 37.1 (23 Jan 2019 07:14), Max: 37.6 (22 Jan 2019 12:22)  T(F): 98.7 (23 Jan 2019 07:14), Max: 99.6 (22 Jan 2019 12:22)  HR: 93 (23 Jan 2019 07:01) (82 - 102)  BP: 137/79 (23 Jan 2019 07:01) (96/22 - 160/90)  BP(mean): 92 (23 Jan 2019 07:01) (37 - 109)  ABP: --  ABP(mean): --  RR: 14 (23 Jan 2019 07:01) (10 - 21)  SpO2: 100% (23 Jan 2019 07:01) (98% - 100%)      ## Physical Exam:  Gen: Adult male lying in bed, NAD. agitated, not coherent  HEENT: NC/AT, sclerae anicteric  Resp: No increased WOB, ronchorous breath sounds b/l  CV: S1, S2  Abd: Soft + BS  Ext: No edema  Neuro: Agitated, does not follow commands    ## Vent Data      ## Labs:  Chem:  01-23    143  |  106  |  6<L>  ----------------------------<  96  3.3<L>   |  27  |  0.71    Ca    8.4<L>      23 Jan 2019 03:56  Phos  2.3     01-23  Mg     2.0     01-23    TPro  6.8  /  Alb  2.6<L>  /  TBili  0.3  /  DBili  x   /  AST  28  /  ALT  22  /  AlkPhos  36<L>  01-22    LIVER FUNCTIONS - ( 22 Jan 2019 04:15 )  Alb: 2.6 g/dL / Pro: 6.8 gm/dL / ALK PHOS: 36 U/L / ALT: 22 U/L / AST: 28 U/L / GGT: x           CBC:                        13.8   4.72  )-----------( 438      ( 23 Jan 2019 03:56 )             42.6     Coags:          ## Cardiac        ## Blood Gas      #I/Os  I&O's Detail    22 Jan 2019 07:01  -  23 Jan 2019 07:00  --------------------------------------------------------  IN:    dextrose 5%: 900 mL    IV PiggyBack: 500 mL    Solution: 100 mL  Total IN: 1500 mL    OUT:    Indwelling Catheter - Urethral: 2630 mL  Total OUT: 2630 mL    Total NET: -1130 mL          ## Imaging:    ## Medications:  MEDICATIONS  (STANDING):  cefTRIAXone   IVPB 1 Gram(s) IV Intermittent every 24 hours  cefTRIAXone   IVPB      dextrose 5% 1000 milliLiter(s) (50 mL/Hr) IV Continuous <Continuous>  enoxaparin Injectable 40 milliGRAM(s) SubCutaneous every 24 hours  folic acid Injectable 1 milliGRAM(s) IV Push daily  influenza   Vaccine 0.5 milliLiter(s) IntraMuscular once  metroNIDAZOLE  IVPB 500 milliGRAM(s) IV Intermittent every 8 hours  metroNIDAZOLE  IVPB      pantoprazole  Injectable 40 milliGRAM(s) IV Push every 12 hours  PHENobarbital Injectable 130 milliGRAM(s) IV Push every 6 hours  tamsulosin 0.4 milliGRAM(s) Oral at bedtime  thiamine Injectable 100 milliGRAM(s) IV Push daily    MEDICATIONS  (PRN):  acetaminophen  Suppository .. 650 milliGRAM(s) Rectal every 6 hours PRN Temp greater or equal to 38C (100.4F)  ondansetron Injectable 4 milliGRAM(s) IV Push every 6 hours PRN Nausea and/or Vomiting  PHENobarbital Injectable 65 milliGRAM(s) IV Push every 15 minutes PRN ciwa >8  PHENobarbital Injectable 130 milliGRAM(s) IV Push every 15 minutes PRN ciwa >20

## 2019-01-24 LAB
ALBUMIN SERPL ELPH-MCNC: 2.7 G/DL — LOW (ref 3.3–5)
ALP SERPL-CCNC: 41 U/L — SIGNIFICANT CHANGE UP (ref 40–120)
ALT FLD-CCNC: 25 U/L — SIGNIFICANT CHANGE UP (ref 12–78)
ANION GAP SERPL CALC-SCNC: 9 MMOL/L — SIGNIFICANT CHANGE UP (ref 5–17)
AST SERPL-CCNC: 35 U/L — SIGNIFICANT CHANGE UP (ref 15–37)
BASOPHILS # BLD AUTO: 0.04 K/UL — SIGNIFICANT CHANGE UP (ref 0–0.2)
BASOPHILS NFR BLD AUTO: 0.9 % — SIGNIFICANT CHANGE UP (ref 0–2)
BILIRUB SERPL-MCNC: 0.4 MG/DL — SIGNIFICANT CHANGE UP (ref 0.2–1.2)
BUN SERPL-MCNC: 4 MG/DL — LOW (ref 7–23)
CALCIUM SERPL-MCNC: 8.5 MG/DL — SIGNIFICANT CHANGE UP (ref 8.5–10.1)
CHLORIDE SERPL-SCNC: 103 MMOL/L — SIGNIFICANT CHANGE UP (ref 96–108)
CO2 SERPL-SCNC: 27 MMOL/L — SIGNIFICANT CHANGE UP (ref 22–31)
CREAT SERPL-MCNC: 0.71 MG/DL — SIGNIFICANT CHANGE UP (ref 0.5–1.3)
EOSINOPHIL # BLD AUTO: 0.19 K/UL — SIGNIFICANT CHANGE UP (ref 0–0.5)
EOSINOPHIL NFR BLD AUTO: 4.1 % — SIGNIFICANT CHANGE UP (ref 0–6)
GLUCOSE SERPL-MCNC: 96 MG/DL — SIGNIFICANT CHANGE UP (ref 70–99)
HCT VFR BLD CALC: 43.1 % — SIGNIFICANT CHANGE UP (ref 39–50)
HGB BLD-MCNC: 13.6 G/DL — SIGNIFICANT CHANGE UP (ref 13–17)
IMM GRANULOCYTES NFR BLD AUTO: 0.9 % — SIGNIFICANT CHANGE UP (ref 0–1.5)
LYMPHOCYTES # BLD AUTO: 1.14 K/UL — SIGNIFICANT CHANGE UP (ref 1–3.3)
LYMPHOCYTES # BLD AUTO: 24.7 % — SIGNIFICANT CHANGE UP (ref 13–44)
MAGNESIUM SERPL-MCNC: 2.2 MG/DL — SIGNIFICANT CHANGE UP (ref 1.6–2.6)
MCHC RBC-ENTMCNC: 28.1 PG — SIGNIFICANT CHANGE UP (ref 27–34)
MCHC RBC-ENTMCNC: 31.6 GM/DL — LOW (ref 32–36)
MCV RBC AUTO: 89 FL — SIGNIFICANT CHANGE UP (ref 80–100)
MONOCYTES # BLD AUTO: 0.47 K/UL — SIGNIFICANT CHANGE UP (ref 0–0.9)
MONOCYTES NFR BLD AUTO: 10.2 % — SIGNIFICANT CHANGE UP (ref 2–14)
NEUTROPHILS # BLD AUTO: 2.74 K/UL — SIGNIFICANT CHANGE UP (ref 1.8–7.4)
NEUTROPHILS NFR BLD AUTO: 59.2 % — SIGNIFICANT CHANGE UP (ref 43–77)
NRBC # BLD: 0 /100 WBCS — SIGNIFICANT CHANGE UP (ref 0–0)
PHOSPHATE SERPL-MCNC: 2.3 MG/DL — LOW (ref 2.5–4.5)
PLATELET # BLD AUTO: 364 K/UL — SIGNIFICANT CHANGE UP (ref 150–400)
POTASSIUM SERPL-MCNC: 3.5 MMOL/L — SIGNIFICANT CHANGE UP (ref 3.5–5.3)
POTASSIUM SERPL-SCNC: 3.5 MMOL/L — SIGNIFICANT CHANGE UP (ref 3.5–5.3)
PROT SERPL-MCNC: 7.5 GM/DL — SIGNIFICANT CHANGE UP (ref 6–8.3)
RBC # BLD: 4.84 M/UL — SIGNIFICANT CHANGE UP (ref 4.2–5.8)
RBC # FLD: 12.8 % — SIGNIFICANT CHANGE UP (ref 10.3–14.5)
SODIUM SERPL-SCNC: 139 MMOL/L — SIGNIFICANT CHANGE UP (ref 135–145)
WBC # BLD: 4.62 K/UL — SIGNIFICANT CHANGE UP (ref 3.8–10.5)
WBC # FLD AUTO: 4.62 K/UL — SIGNIFICANT CHANGE UP (ref 3.8–10.5)

## 2019-01-24 PROCEDURE — 99233 SBSQ HOSP IP/OBS HIGH 50: CPT

## 2019-01-24 RX ORDER — POTASSIUM PHOSPHATE, MONOBASIC POTASSIUM PHOSPHATE, DIBASIC 236; 224 MG/ML; MG/ML
15 INJECTION, SOLUTION INTRAVENOUS ONCE
Qty: 0 | Refills: 0 | Status: COMPLETED | OUTPATIENT
Start: 2019-01-24 | End: 2019-01-24

## 2019-01-24 RX ADMIN — Medication 130 MILLIGRAM(S): at 01:26

## 2019-01-24 RX ADMIN — CHLORHEXIDINE GLUCONATE 1 APPLICATION(S): 213 SOLUTION TOPICAL at 11:06

## 2019-01-24 RX ADMIN — PANTOPRAZOLE SODIUM 40 MILLIGRAM(S): 20 TABLET, DELAYED RELEASE ORAL at 17:05

## 2019-01-24 RX ADMIN — PIPERACILLIN AND TAZOBACTAM 25 GRAM(S): 4; .5 INJECTION, POWDER, LYOPHILIZED, FOR SOLUTION INTRAVENOUS at 06:13

## 2019-01-24 RX ADMIN — SCOPALAMINE 1 PATCH: 1 PATCH, EXTENDED RELEASE TRANSDERMAL at 19:05

## 2019-01-24 RX ADMIN — Medication 1 MILLIGRAM(S): at 21:27

## 2019-01-24 RX ADMIN — SCOPALAMINE 1 PATCH: 1 PATCH, EXTENDED RELEASE TRANSDERMAL at 07:39

## 2019-01-24 RX ADMIN — Medication 65 MILLIGRAM(S): at 18:15

## 2019-01-24 RX ADMIN — ENOXAPARIN SODIUM 40 MILLIGRAM(S): 100 INJECTION SUBCUTANEOUS at 17:30

## 2019-01-24 RX ADMIN — Medication 130 MILLIGRAM(S): at 06:13

## 2019-01-24 RX ADMIN — Medication 65 MILLIGRAM(S): at 22:40

## 2019-01-24 RX ADMIN — PANTOPRAZOLE SODIUM 40 MILLIGRAM(S): 20 TABLET, DELAYED RELEASE ORAL at 06:13

## 2019-01-24 RX ADMIN — Medication 65 MILLIGRAM(S): at 20:35

## 2019-01-24 RX ADMIN — PIPERACILLIN AND TAZOBACTAM 25 GRAM(S): 4; .5 INJECTION, POWDER, LYOPHILIZED, FOR SOLUTION INTRAVENOUS at 13:32

## 2019-01-24 RX ADMIN — PIPERACILLIN AND TAZOBACTAM 25 GRAM(S): 4; .5 INJECTION, POWDER, LYOPHILIZED, FOR SOLUTION INTRAVENOUS at 21:27

## 2019-01-24 RX ADMIN — Medication 130 MILLIGRAM(S): at 23:27

## 2019-01-24 RX ADMIN — Medication 100 MILLIGRAM(S): at 11:05

## 2019-01-24 RX ADMIN — Medication 130 MILLIGRAM(S): at 17:05

## 2019-01-24 RX ADMIN — Medication 65 MILLIGRAM(S): at 18:45

## 2019-01-24 RX ADMIN — POTASSIUM PHOSPHATE, MONOBASIC POTASSIUM PHOSPHATE, DIBASIC 63.75 MILLIMOLE(S): 236; 224 INJECTION, SOLUTION INTRAVENOUS at 07:38

## 2019-01-24 RX ADMIN — Medication 1 MILLIGRAM(S): at 11:05

## 2019-01-24 RX ADMIN — SODIUM CHLORIDE 50 MILLILITER(S): 9 INJECTION, SOLUTION INTRAVENOUS at 20:36

## 2019-01-24 RX ADMIN — Medication 130 MILLIGRAM(S): at 11:05

## 2019-01-24 NOTE — PROGRESS NOTE ADULT - ASSESSMENT
54 y/o M w/alcohol abuse presenting with alcohol withdrawal with behavioral disturbance requiring high doses of sedatives.    - Continue standing phenobarb 130mg q6hrs   - Continue PRN phenobarb, continues to require PRN dosing  - Haldol PRN if QTc is < 500, otherwise ativan PRN  - MVI, thiamine, folate  - Continue abx for possible aspiration PNA

## 2019-01-24 NOTE — PROGRESS NOTE ADULT - SUBJECTIVE AND OBJECTIVE BOX
HPI:  Pt is a 54 y/o male w/PMH gerd, HLD, etoh abuse presents with complaint of tremors, abdominal pain, nausea, and body aches.  States drinks whiskey regularly cant quantify amount, about 4 days ago started to drink alot more than usual as was w/friends, and states started to get abdominal pains and unable to keep anything down over the last 2 days, last drink 2 days, ago, first reports was just throwing up food material then dark coffee ground like. no melena or brbpr.    pt denies any fever, chills, sob, cp, palpitations, +n/+v/-d/-c no travels or sick cotnacts. (12 Jan 2019 00:23)      24 hr events: No acute events. Continues to require PRN meds and frequent suctioning.    ## ROS:  [x ] unable to obtain    ## Vitals  ICU Vital Signs Last 24 Hrs  T(C): 37.3 (24 Jan 2019 07:31), Max: 37.7 (24 Jan 2019 03:00)  T(F): 99.2 (24 Jan 2019 07:31), Max: 99.8 (24 Jan 2019 03:00)  HR: 92 (24 Jan 2019 09:00) (78 - 112)  BP: 134/90 (24 Jan 2019 09:00) (94/60 - 149/88)  BP(mean): 100 (24 Jan 2019 09:00) (66 - 102)  ABP: --  ABP(mean): --  RR: 11 (24 Jan 2019 09:00) (11 - 19)  SpO2: 100% (24 Jan 2019 09:00) (94% - 100%)      ## Physical Exam:  Gen: Adult male lying in bed, NAD.   HEENT: NC/AT, sclerae anicteric  Resp: No increased WOB, ronchorous breath sounds b/l  CV: S1, S2  Abd: Soft + BS  Ext: No edema  Neuro: Sleepy, but arousable, calm, not coherent, does not follow commands    ## Vent Data      ## Labs:  Chem:  01-24    139  |  103  |  4<L>  ----------------------------<  96  3.5   |  27  |  0.71    Ca    8.5      24 Jan 2019 04:05  Phos  2.3     01-24  Mg     2.2     01-24    TPro  7.5  /  Alb  2.7<L>  /  TBili  0.4  /  DBili  x   /  AST  35  /  ALT  25  /  AlkPhos  41  01-24    LIVER FUNCTIONS - ( 24 Jan 2019 04:05 )  Alb: 2.7 g/dL / Pro: 7.5 gm/dL / ALK PHOS: 41 U/L / ALT: 25 U/L / AST: 35 U/L / GGT: x           CBC:                        13.6   4.62  )-----------( 364      ( 24 Jan 2019 04:05 )             43.1     Coags:          ## Cardiac        ## Blood Gas      #I/Os  I&O's Detail    23 Jan 2019 07:01  -  24 Jan 2019 07:00  --------------------------------------------------------  IN:    dextrose 5%: 850 mL    IV PiggyBack: 100 mL  Total IN: 950 mL    OUT:    Indwelling Catheter - Urethral: 2710 mL  Total OUT: 2710 mL    Total NET: -1760 mL      24 Jan 2019 07:01  -  24 Jan 2019 10:50  --------------------------------------------------------  IN:    dextrose 5%: 200 mL    IV PiggyBack: 250 mL  Total IN: 450 mL    OUT:    Indwelling Catheter - Urethral: 50 mL  Total OUT: 50 mL    Total NET: 400 mL          ## Imaging:    ## Medications:  MEDICATIONS  (STANDING):  chlorhexidine 4% Liquid 1 Application(s) Topical <User Schedule>  dextrose 5% 1000 milliLiter(s) (50 mL/Hr) IV Continuous <Continuous>  doxazosin 1 milliGRAM(s) Oral at bedtime  enoxaparin Injectable 40 milliGRAM(s) SubCutaneous every 24 hours  folic acid Injectable 1 milliGRAM(s) IV Push daily  influenza   Vaccine 0.5 milliLiter(s) IntraMuscular once  pantoprazole  Injectable 40 milliGRAM(s) IV Push every 12 hours  PHENobarbital Injectable 130 milliGRAM(s) IV Push every 6 hours  piperacillin/tazobactam IVPB. 3.375 Gram(s) IV Intermittent every 8 hours  scopolamine   Patch 1 Patch Transdermal every 72 hours  thiamine Injectable 100 milliGRAM(s) IV Push daily    MEDICATIONS  (PRN):  acetaminophen  Suppository .. 650 milliGRAM(s) Rectal every 6 hours PRN Temp greater or equal to 38C (100.4F)  ondansetron Injectable 4 milliGRAM(s) IV Push every 6 hours PRN Nausea and/or Vomiting  PHENobarbital Injectable 65 milliGRAM(s) IV Push every 15 minutes PRN ciwa >8  PHENobarbital Injectable 130 milliGRAM(s) IV Push every 15 minutes PRN ciwa >20

## 2019-01-25 LAB
-  AMPICILLIN/SULBACTAM: SIGNIFICANT CHANGE UP
-  CEFAZOLIN: SIGNIFICANT CHANGE UP
-  CLINDAMYCIN: SIGNIFICANT CHANGE UP
-  ERYTHROMYCIN: SIGNIFICANT CHANGE UP
-  GENTAMICIN: SIGNIFICANT CHANGE UP
-  LINEZOLID: SIGNIFICANT CHANGE UP
-  OXACILLIN: SIGNIFICANT CHANGE UP
-  PENICILLIN: SIGNIFICANT CHANGE UP
-  RIFAMPIN: SIGNIFICANT CHANGE UP
-  TETRACYCLINE: SIGNIFICANT CHANGE UP
-  TRIMETHOPRIM/SULFAMETHOXAZOLE: SIGNIFICANT CHANGE UP
-  VANCOMYCIN: SIGNIFICANT CHANGE UP
ALBUMIN SERPL ELPH-MCNC: 2.9 G/DL — LOW (ref 3.3–5)
ALP SERPL-CCNC: 44 U/L — SIGNIFICANT CHANGE UP (ref 40–120)
ALT FLD-CCNC: 28 U/L — SIGNIFICANT CHANGE UP (ref 12–78)
ANION GAP SERPL CALC-SCNC: 10 MMOL/L — SIGNIFICANT CHANGE UP (ref 5–17)
AST SERPL-CCNC: 32 U/L — SIGNIFICANT CHANGE UP (ref 15–37)
BASOPHILS # BLD AUTO: 0.06 K/UL — SIGNIFICANT CHANGE UP (ref 0–0.2)
BASOPHILS NFR BLD AUTO: 0.9 % — SIGNIFICANT CHANGE UP (ref 0–2)
BILIRUB SERPL-MCNC: 0.3 MG/DL — SIGNIFICANT CHANGE UP (ref 0.2–1.2)
BUN SERPL-MCNC: 3 MG/DL — LOW (ref 7–23)
CALCIUM SERPL-MCNC: 8.8 MG/DL — SIGNIFICANT CHANGE UP (ref 8.5–10.1)
CHLORIDE SERPL-SCNC: 106 MMOL/L — SIGNIFICANT CHANGE UP (ref 96–108)
CO2 SERPL-SCNC: 23 MMOL/L — SIGNIFICANT CHANGE UP (ref 22–31)
CREAT SERPL-MCNC: 0.7 MG/DL — SIGNIFICANT CHANGE UP (ref 0.5–1.3)
EOSINOPHIL # BLD AUTO: 0.17 K/UL — SIGNIFICANT CHANGE UP (ref 0–0.5)
EOSINOPHIL NFR BLD AUTO: 2.6 % — SIGNIFICANT CHANGE UP (ref 0–6)
FLU A RESULT: SIGNIFICANT CHANGE UP
FLU A RESULT: SIGNIFICANT CHANGE UP
FLUAV AG NPH QL: SIGNIFICANT CHANGE UP
FLUBV AG NPH QL: SIGNIFICANT CHANGE UP
GLUCOSE SERPL-MCNC: 104 MG/DL — HIGH (ref 70–99)
HCT VFR BLD CALC: 45 % — SIGNIFICANT CHANGE UP (ref 39–50)
HGB BLD-MCNC: 14.6 G/DL — SIGNIFICANT CHANGE UP (ref 13–17)
IMM GRANULOCYTES NFR BLD AUTO: 0.6 % — SIGNIFICANT CHANGE UP (ref 0–1.5)
LYMPHOCYTES # BLD AUTO: 1.07 K/UL — SIGNIFICANT CHANGE UP (ref 1–3.3)
LYMPHOCYTES # BLD AUTO: 16.5 % — SIGNIFICANT CHANGE UP (ref 13–44)
MAGNESIUM SERPL-MCNC: 2.7 MG/DL — HIGH (ref 1.6–2.6)
MCHC RBC-ENTMCNC: 28.7 PG — SIGNIFICANT CHANGE UP (ref 27–34)
MCHC RBC-ENTMCNC: 32.4 GM/DL — SIGNIFICANT CHANGE UP (ref 32–36)
MCV RBC AUTO: 88.6 FL — SIGNIFICANT CHANGE UP (ref 80–100)
METHOD TYPE: SIGNIFICANT CHANGE UP
MONOCYTES # BLD AUTO: 0.49 K/UL — SIGNIFICANT CHANGE UP (ref 0–0.9)
MONOCYTES NFR BLD AUTO: 7.6 % — SIGNIFICANT CHANGE UP (ref 2–14)
NEUTROPHILS # BLD AUTO: 4.64 K/UL — SIGNIFICANT CHANGE UP (ref 1.8–7.4)
NEUTROPHILS NFR BLD AUTO: 71.8 % — SIGNIFICANT CHANGE UP (ref 43–77)
NRBC # BLD: 0 /100 WBCS — SIGNIFICANT CHANGE UP (ref 0–0)
PHOSPHATE SERPL-MCNC: 2.2 MG/DL — LOW (ref 2.5–4.5)
PLATELET # BLD AUTO: 455 K/UL — HIGH (ref 150–400)
POTASSIUM SERPL-MCNC: 4 MMOL/L — SIGNIFICANT CHANGE UP (ref 3.5–5.3)
POTASSIUM SERPL-SCNC: 4 MMOL/L — SIGNIFICANT CHANGE UP (ref 3.5–5.3)
PROCALCITONIN SERPL-MCNC: 0.04 NG/ML — SIGNIFICANT CHANGE UP (ref 0.02–0.1)
PROT SERPL-MCNC: 8.2 GM/DL — SIGNIFICANT CHANGE UP (ref 6–8.3)
RAPID RVP RESULT: SIGNIFICANT CHANGE UP
RBC # BLD: 5.08 M/UL — SIGNIFICANT CHANGE UP (ref 4.2–5.8)
RBC # FLD: 12.8 % — SIGNIFICANT CHANGE UP (ref 10.3–14.5)
RSV RESULT: SIGNIFICANT CHANGE UP
RSV RNA RESP QL NAA+PROBE: SIGNIFICANT CHANGE UP
SODIUM SERPL-SCNC: 139 MMOL/L — SIGNIFICANT CHANGE UP (ref 135–145)
WBC # BLD: 6.47 K/UL — SIGNIFICANT CHANGE UP (ref 3.8–10.5)
WBC # FLD AUTO: 6.47 K/UL — SIGNIFICANT CHANGE UP (ref 3.8–10.5)

## 2019-01-25 PROCEDURE — 99223 1ST HOSP IP/OBS HIGH 75: CPT

## 2019-01-25 PROCEDURE — 99233 SBSQ HOSP IP/OBS HIGH 50: CPT

## 2019-01-25 RX ORDER — HALOPERIDOL DECANOATE 100 MG/ML
5 INJECTION INTRAMUSCULAR ONCE
Qty: 0 | Refills: 0 | Status: COMPLETED | OUTPATIENT
Start: 2019-01-25 | End: 2019-01-25

## 2019-01-25 RX ORDER — VANCOMYCIN HCL 1 G
1000 VIAL (EA) INTRAVENOUS ONCE
Qty: 0 | Refills: 0 | Status: COMPLETED | OUTPATIENT
Start: 2019-01-25 | End: 2019-01-25

## 2019-01-25 RX ORDER — VANCOMYCIN HCL 1 G
1000 VIAL (EA) INTRAVENOUS EVERY 8 HOURS
Qty: 0 | Refills: 0 | Status: DISCONTINUED | OUTPATIENT
Start: 2019-01-25 | End: 2019-01-27

## 2019-01-25 RX ORDER — CEFEPIME 1 G/1
2000 INJECTION, POWDER, FOR SOLUTION INTRAMUSCULAR; INTRAVENOUS EVERY 8 HOURS
Qty: 0 | Refills: 0 | Status: DISCONTINUED | OUTPATIENT
Start: 2019-01-25 | End: 2019-01-31

## 2019-01-25 RX ORDER — OLANZAPINE 15 MG/1
2.5 TABLET, FILM COATED ORAL ONCE
Qty: 0 | Refills: 0 | Status: COMPLETED | OUTPATIENT
Start: 2019-01-25 | End: 2019-01-25

## 2019-01-25 RX ORDER — VANCOMYCIN HCL 1 G
VIAL (EA) INTRAVENOUS
Qty: 0 | Refills: 0 | Status: DISCONTINUED | OUTPATIENT
Start: 2019-01-25 | End: 2019-01-25

## 2019-01-25 RX ORDER — PHENOBARBITAL 60 MG
65 TABLET ORAL EVERY 6 HOURS
Qty: 0 | Refills: 0 | Status: DISCONTINUED | OUTPATIENT
Start: 2019-01-25 | End: 2019-01-27

## 2019-01-25 RX ORDER — VANCOMYCIN HCL 1 G
VIAL (EA) INTRAVENOUS
Qty: 0 | Refills: 0 | Status: DISCONTINUED | OUTPATIENT
Start: 2019-01-25 | End: 2019-01-27

## 2019-01-25 RX ADMIN — Medication 100 MILLIGRAM(S): at 11:41

## 2019-01-25 RX ADMIN — PANTOPRAZOLE SODIUM 40 MILLIGRAM(S): 20 TABLET, DELAYED RELEASE ORAL at 05:29

## 2019-01-25 RX ADMIN — Medication 650 MILLIGRAM(S): at 09:00

## 2019-01-25 RX ADMIN — Medication 1 MILLIGRAM(S): at 21:04

## 2019-01-25 RX ADMIN — Medication 65 MILLIGRAM(S): at 15:59

## 2019-01-25 RX ADMIN — HALOPERIDOL DECANOATE 5 MILLIGRAM(S): 100 INJECTION INTRAMUSCULAR at 01:51

## 2019-01-25 RX ADMIN — Medication 65 MILLIGRAM(S): at 22:27

## 2019-01-25 RX ADMIN — SCOPALAMINE 1 PATCH: 1 PATCH, EXTENDED RELEASE TRANSDERMAL at 07:05

## 2019-01-25 RX ADMIN — PIPERACILLIN AND TAZOBACTAM 25 GRAM(S): 4; .5 INJECTION, POWDER, LYOPHILIZED, FOR SOLUTION INTRAVENOUS at 05:29

## 2019-01-25 RX ADMIN — SODIUM CHLORIDE 50 MILLILITER(S): 9 INJECTION, SOLUTION INTRAVENOUS at 15:04

## 2019-01-25 RX ADMIN — HALOPERIDOL DECANOATE 5 MILLIGRAM(S): 100 INJECTION INTRAMUSCULAR at 21:48

## 2019-01-25 RX ADMIN — Medication 650 MILLIGRAM(S): at 08:00

## 2019-01-25 RX ADMIN — Medication 62.5 MILLIMOLE(S): at 06:26

## 2019-01-25 RX ADMIN — CEFEPIME 100 MILLIGRAM(S): 1 INJECTION, POWDER, FOR SOLUTION INTRAMUSCULAR; INTRAVENOUS at 22:05

## 2019-01-25 RX ADMIN — SCOPALAMINE 1 PATCH: 1 PATCH, EXTENDED RELEASE TRANSDERMAL at 19:26

## 2019-01-25 RX ADMIN — Medication 130 MILLIGRAM(S): at 12:51

## 2019-01-25 RX ADMIN — PANTOPRAZOLE SODIUM 40 MILLIGRAM(S): 20 TABLET, DELAYED RELEASE ORAL at 17:22

## 2019-01-25 RX ADMIN — CEFEPIME 100 MILLIGRAM(S): 1 INJECTION, POWDER, FOR SOLUTION INTRAMUSCULAR; INTRAVENOUS at 15:04

## 2019-01-25 RX ADMIN — OLANZAPINE 2.5 MILLIGRAM(S): 15 TABLET, FILM COATED ORAL at 23:21

## 2019-01-25 RX ADMIN — Medication 65 MILLIGRAM(S): at 21:04

## 2019-01-25 RX ADMIN — ENOXAPARIN SODIUM 40 MILLIGRAM(S): 100 INJECTION SUBCUTANEOUS at 17:22

## 2019-01-25 RX ADMIN — Medication 130 MILLIGRAM(S): at 05:29

## 2019-01-25 RX ADMIN — Medication 1 MILLIGRAM(S): at 11:41

## 2019-01-25 RX ADMIN — Medication 250 MILLIGRAM(S): at 12:51

## 2019-01-25 RX ADMIN — CHLORHEXIDINE GLUCONATE 1 APPLICATION(S): 213 SOLUTION TOPICAL at 12:00

## 2019-01-25 RX ADMIN — Medication 65 MILLIGRAM(S): at 19:34

## 2019-01-25 RX ADMIN — Medication 65 MILLIGRAM(S): at 23:21

## 2019-01-25 RX ADMIN — Medication 130 MILLIGRAM(S): at 17:22

## 2019-01-25 RX ADMIN — Medication 250 MILLIGRAM(S): at 21:04

## 2019-01-25 NOTE — PHARMACY COMMUNICATION NOTE - COMMENTS
Confirmed with Lala she wants the phenobarbital 65mg IVP Q6H around the clock
s/w bernardino aragon - dose already given
patient just received a prior dose of 65mg phenobarbital injectable, recommended to have dose of 130mg reduced to 65mg and order holding parameters for respiratory rate and bp.

## 2019-01-25 NOTE — PROGRESS NOTE ADULT - ASSESSMENT
52 y/o M w/alcohol abuse presenting with alcohol withdrawal with behavioral disturbance requiring high doses of sedatives.    - Continue standing phenobarb 130mg q6hrs   - Continue PRN phenobarb, continues to require PRN dosing  - Haldol PRN if QTc is < 500, otherwise ativan PRN  - MVI, thiamine, folate  - Continue abx for possible aspiration PNA

## 2019-01-25 NOTE — CONSULT NOTE ADULT - ASSESSMENT
53 yr old male with alcohol over dose being managed in ICU noted to be coughing with fever and found to have pneumonia  sputum c/s MRSA and pseudomonas  change zosyn to cefepime   start vanco   check flu swab as well 53 yr old male with alcohol over dose being managed in ICU noted to be coughing with fever and found to have pneumonia  sputum c/s MRSA and pseudomonas  change zosyn to cefepime  send blood c/s   start vanco ,follow levels  check flu swab as well

## 2019-01-25 NOTE — CONSULT NOTE ADULT - SUBJECTIVE AND OBJECTIVE BOX
Infectious Diseases - Attending at Dr. Sheriff    HPI:  Pt is a 52 y/o male w/PMH gerd, HLD, etoh abuse presents with complaint of tremors, abdominal pain, nausea, and body aches.  States drinks whiskey regularly cant quantify amount, about 4 days ago started to drink alot more than usual as was w/friends, and states started to get abdominal pains and unable to keep anything down over the last 2 days, last drink 2 days, ago, first reports was just throwing up food material then dark coffee ground like. no melena or brbpr.    pt febrile   sputum c/s MRSA/pseudomonas/very lethargic, moaning, coughing      PAST MEDICAL & SURGICAL HISTORY:  EtOH dependence  Mixed hyperlipidemia  PUD (peptic ulcer disease)  No significant past surgical history      Allergies    No Known Allergies    Intolerances        FAMILY HISTORY:  No pertinent family history in first degree relatives (Father, Mother)      SOCIAL HISTORY: alcoholic     REVIEW OF SYSTEMS:    as per [facility]       MEDICATIONS  (STANDING):  cefepime   IVPB 2000 milliGRAM(s) IV Intermittent every 8 hours  chlorhexidine 4% Liquid 1 Application(s) Topical <User Schedule>  dextrose 5% 1000 milliLiter(s) (50 mL/Hr) IV Continuous <Continuous>  doxazosin 1 milliGRAM(s) Oral at bedtime  enoxaparin Injectable 40 milliGRAM(s) SubCutaneous every 24 hours  folic acid Injectable 1 milliGRAM(s) IV Push daily  influenza   Vaccine 0.5 milliLiter(s) IntraMuscular once  pantoprazole  Injectable 40 milliGRAM(s) IV Push every 12 hours  PHENobarbital Injectable 130 milliGRAM(s) IV Push every 6 hours  scopolamine   Patch 1 Patch Transdermal every 72 hours  thiamine Injectable 100 milliGRAM(s) IV Push daily  vancomycin  IVPB      vancomycin  IVPB 1000 milliGRAM(s) IV Intermittent every 8 hours    MEDICATIONS  (PRN):  acetaminophen  Suppository .. 650 milliGRAM(s) Rectal every 6 hours PRN Temp greater or equal to 38C (100.4F)  ondansetron Injectable 4 milliGRAM(s) IV Push every 6 hours PRN Nausea and/or Vomiting  PHENobarbital Injectable 65 milliGRAM(s) IV Push every 15 minutes PRN ciwa >8  PHENobarbital Injectable 130 milliGRAM(s) IV Push every 15 minutes PRN ciwa >20      Vital Signs Last 24 Hrs  T(C): 37.9 (25 Jan 2019 12:17), Max: 38.6 (25 Jan 2019 08:16)  T(F): 100.2 (25 Jan 2019 12:17), Max: 101.4 (25 Jan 2019 08:16)  HR: 96 (25 Jan 2019 15:00) (85 - 106)  BP: 115/85 (25 Jan 2019 15:00) (92/57 - 141/89)  BP(mean): 93 (25 Jan 2019 15:00) (65 - 103)  RR: 14 (25 Jan 2019 15:00) (10 - 20)  SpO2: 98% (25 Jan 2019 15:00) (91% - 100%)    PHYSICAL EXAM:    Constitutional: very lethargic ,  well-developed  HEENT: PERRL  Neck: No LAD, No JVD  Back: Normal spine flexure, No CVA tenderness  Respiratory: coarse breath sounds  Cardiovascular: S1 and S2, RRR, no M/G/R  Gastrointestinal: BS+, soft, NT/ND  Extremities: No peripheral edema  Vascular: 2+ peripheral pulses  Neurological: A/O x 3, no focal deficits  Skin: No rashes      LABS:                        14.6   6.47  )-----------( 455      ( 25 Jan 2019 04:24 )             45.0     01-25    139  |  106  |  3<L>  ----------------------------<  104<H>  4.0   |  23  |  0.70    Ca    8.8      25 Jan 2019 04:24  Phos  2.2     01-25  Mg     2.7     01-25    TPro  8.2  /  Alb  2.9<L>  /  TBili  0.3  /  DBili  x   /  AST  32  /  ALT  28  /  AlkPhos  44  01-25          MICROBIOLOGY:  RECENT CULTURES:  01-23 .Sputum Sputum/ trap Methicillin resistant Staphylococcus aureus   Rare polymorphonuclear leukocytes seen per low power field  Moderate Squamous epithelial cells seen per low power field  Moderate Gram Positive Cocci in Clusters seen per oil power field  Results consistent with oropharyngeal contamination   Numerous Methicillin resistant Staphylococcus aureus  Few Pseudomonas aeruginosa Susceptibility to follow.  Normal Respiratory Melanie present          RADIOLOGY & ADDITIONAL STUDIES:    XRAY   FINDINGS:     1/23/2018 at 7:21 PM: There is an NG tube with the tip in the distal   esophagus. Lungs are grossly clear. Heart size is within normal limits.    1/23/2018 at 8:56 PM: There is an NG tube seen with the tip in the   stomach. There is mild prominence of the right hilar vasculature, likely   exaggerated by suboptimal inspiration.    IMPRESSION:  NG tube within the stomach.

## 2019-01-25 NOTE — PROGRESS NOTE ADULT - SUBJECTIVE AND OBJECTIVE BOX
HPI:  Pt is a 52 y/o male w/PMH gerd, HLD, etoh abuse presents with complaint of tremors, abdominal pain, nausea, and body aches.  States drinks whiskey regularly cant quantify amount, about 4 days ago started to drink alot more than usual as was w/friends, and states started to get abdominal pains and unable to keep anything down over the last 2 days, last drink 2 days, ago, first reports was just throwing up food material then dark coffee ground like. no melena or brbpr.    pt denies any fever, chills, sob, cp, palpitations, +n/+v/-d/-c no travels or sick cotnacts. (12 Jan 2019 00:23)      24 hr events: No acute events. Continues to require frequent suctioning    ## ROS:  [ x] unable to obtain    ## Vitals  ICU Vital Signs Last 24 Hrs  T(C): 38.6 (25 Jan 2019 08:16), Max: 38.6 (25 Jan 2019 08:16)  T(F): 101.4 (25 Jan 2019 08:16), Max: 101.4 (25 Jan 2019 08:16)  HR: 98 (25 Jan 2019 09:00) (79 - 106)  BP: 97/70 (25 Jan 2019 09:00) (97/66 - 150/95)  BP(mean): 77 (25 Jan 2019 09:00) (73 - 109)  ABP: --  ABP(mean): --  RR: 12 (25 Jan 2019 09:00) (10 - 20)  SpO2: 95% (25 Jan 2019 09:00) (91% - 100%)      ## Physical Exam:  Gen: Adult male lying in bed, NAD.   HEENT: NC/AT, sclerae anicteric  Resp: No increased WOB, ronchorous breath sounds b/l  CV: S1, S2  Abd: Soft + BS  Ext: No edema  Neuro: Sleepy, but arousable, calm, not coherent, does not follow commands    ## Vent Data      ## Labs:  Chem:  01-25    139  |  106  |  3<L>  ----------------------------<  104<H>  4.0   |  23  |  0.70    Ca    8.8      25 Jan 2019 04:24  Phos  2.2     01-25  Mg     2.7     01-25    TPro  8.2  /  Alb  2.9<L>  /  TBili  0.3  /  DBili  x   /  AST  32  /  ALT  28  /  AlkPhos  44  01-25    LIVER FUNCTIONS - ( 25 Jan 2019 04:24 )  Alb: 2.9 g/dL / Pro: 8.2 gm/dL / ALK PHOS: 44 U/L / ALT: 28 U/L / AST: 32 U/L / GGT: x           CBC:                        14.6   6.47  )-----------( 455      ( 25 Jan 2019 04:24 )             45.0     Coags:          ## Cardiac        ## Blood Gas      #I/Os  I&O's Detail    24 Jan 2019 07:01  -  25 Jan 2019 07:00  --------------------------------------------------------  IN:    dextrose 5%: 1250 mL    IV PiggyBack: 800 mL    Solution: 250 mL    Vital HN: 740 mL  Total IN: 3040 mL    OUT:    Indwelling Catheter - Urethral: 3100 mL  Total OUT: 3100 mL    Total NET: -60 mL      25 Jan 2019 07:01  -  25 Jan 2019 10:20  --------------------------------------------------------  IN:    dextrose 5%: 50 mL    Vital HN: 50 mL  Total IN: 100 mL    OUT:    Indwelling Catheter - Urethral: 70 mL  Total OUT: 70 mL    Total NET: 30 mL          ## Imaging:    ## Medications:  MEDICATIONS  (STANDING):  chlorhexidine 4% Liquid 1 Application(s) Topical <User Schedule>  dextrose 5% 1000 milliLiter(s) (50 mL/Hr) IV Continuous <Continuous>  doxazosin 1 milliGRAM(s) Oral at bedtime  enoxaparin Injectable 40 milliGRAM(s) SubCutaneous every 24 hours  folic acid Injectable 1 milliGRAM(s) IV Push daily  influenza   Vaccine 0.5 milliLiter(s) IntraMuscular once  pantoprazole  Injectable 40 milliGRAM(s) IV Push every 12 hours  PHENobarbital Injectable 130 milliGRAM(s) IV Push every 6 hours  piperacillin/tazobactam IVPB. 3.375 Gram(s) IV Intermittent every 8 hours  scopolamine   Patch 1 Patch Transdermal every 72 hours  thiamine Injectable 100 milliGRAM(s) IV Push daily    MEDICATIONS  (PRN):  acetaminophen  Suppository .. 650 milliGRAM(s) Rectal every 6 hours PRN Temp greater or equal to 38C (100.4F)  ondansetron Injectable 4 milliGRAM(s) IV Push every 6 hours PRN Nausea and/or Vomiting  PHENobarbital Injectable 65 milliGRAM(s) IV Push every 15 minutes PRN ciwa >8  PHENobarbital Injectable 130 milliGRAM(s) IV Push every 15 minutes PRN ciwa >20

## 2019-01-26 LAB
-  AMIKACIN: SIGNIFICANT CHANGE UP
-  AZTREONAM: SIGNIFICANT CHANGE UP
-  CEFEPIME: SIGNIFICANT CHANGE UP
-  CEFTAZIDIME: SIGNIFICANT CHANGE UP
-  CIPROFLOXACIN: SIGNIFICANT CHANGE UP
-  GENTAMICIN: SIGNIFICANT CHANGE UP
-  IMIPENEM: SIGNIFICANT CHANGE UP
-  LEVOFLOXACIN: SIGNIFICANT CHANGE UP
-  MEROPENEM: SIGNIFICANT CHANGE UP
-  PIPERACILLIN/TAZOBACTAM: SIGNIFICANT CHANGE UP
-  TOBRAMYCIN: SIGNIFICANT CHANGE UP
ALBUMIN SERPL ELPH-MCNC: 3.1 G/DL — LOW (ref 3.3–5)
ALP SERPL-CCNC: 48 U/L — SIGNIFICANT CHANGE UP (ref 40–120)
ALT FLD-CCNC: 30 U/L — SIGNIFICANT CHANGE UP (ref 12–78)
ANION GAP SERPL CALC-SCNC: 9 MMOL/L — SIGNIFICANT CHANGE UP (ref 5–17)
AST SERPL-CCNC: 30 U/L — SIGNIFICANT CHANGE UP (ref 15–37)
BASOPHILS # BLD AUTO: 0.06 K/UL — SIGNIFICANT CHANGE UP (ref 0–0.2)
BASOPHILS NFR BLD AUTO: 0.7 % — SIGNIFICANT CHANGE UP (ref 0–2)
BILIRUB SERPL-MCNC: 0.3 MG/DL — SIGNIFICANT CHANGE UP (ref 0.2–1.2)
BUN SERPL-MCNC: 4 MG/DL — LOW (ref 7–23)
CALCIUM SERPL-MCNC: 9 MG/DL — SIGNIFICANT CHANGE UP (ref 8.5–10.1)
CHLORIDE SERPL-SCNC: 103 MMOL/L — SIGNIFICANT CHANGE UP (ref 96–108)
CO2 SERPL-SCNC: 26 MMOL/L — SIGNIFICANT CHANGE UP (ref 22–31)
CREAT SERPL-MCNC: 0.86 MG/DL — SIGNIFICANT CHANGE UP (ref 0.5–1.3)
CULTURE RESULTS: SIGNIFICANT CHANGE UP
EOSINOPHIL # BLD AUTO: 0.27 K/UL — SIGNIFICANT CHANGE UP (ref 0–0.5)
EOSINOPHIL NFR BLD AUTO: 3.1 % — SIGNIFICANT CHANGE UP (ref 0–6)
GLUCOSE SERPL-MCNC: 99 MG/DL — SIGNIFICANT CHANGE UP (ref 70–99)
GRAM STN FLD: SIGNIFICANT CHANGE UP
HCT VFR BLD CALC: 48.2 % — SIGNIFICANT CHANGE UP (ref 39–50)
HGB BLD-MCNC: 15.2 G/DL — SIGNIFICANT CHANGE UP (ref 13–17)
IMM GRANULOCYTES NFR BLD AUTO: 0.8 % — SIGNIFICANT CHANGE UP (ref 0–1.5)
LYMPHOCYTES # BLD AUTO: 1.4 K/UL — SIGNIFICANT CHANGE UP (ref 1–3.3)
LYMPHOCYTES # BLD AUTO: 16.3 % — SIGNIFICANT CHANGE UP (ref 13–44)
MAGNESIUM SERPL-MCNC: 2.8 MG/DL — HIGH (ref 1.6–2.6)
MCHC RBC-ENTMCNC: 28.5 PG — SIGNIFICANT CHANGE UP (ref 27–34)
MCHC RBC-ENTMCNC: 31.5 GM/DL — LOW (ref 32–36)
MCV RBC AUTO: 90.3 FL — SIGNIFICANT CHANGE UP (ref 80–100)
METHOD TYPE: SIGNIFICANT CHANGE UP
MONOCYTES # BLD AUTO: 1.06 K/UL — HIGH (ref 0–0.9)
MONOCYTES NFR BLD AUTO: 12.3 % — SIGNIFICANT CHANGE UP (ref 2–14)
NEUTROPHILS # BLD AUTO: 5.73 K/UL — SIGNIFICANT CHANGE UP (ref 1.8–7.4)
NEUTROPHILS NFR BLD AUTO: 66.8 % — SIGNIFICANT CHANGE UP (ref 43–77)
NRBC # BLD: 0 /100 WBCS — SIGNIFICANT CHANGE UP (ref 0–0)
ORGANISM # SPEC MICROSCOPIC CNT: SIGNIFICANT CHANGE UP
PHOSPHATE SERPL-MCNC: 2.6 MG/DL — SIGNIFICANT CHANGE UP (ref 2.5–4.5)
PLATELET # BLD AUTO: 445 K/UL — HIGH (ref 150–400)
POTASSIUM SERPL-MCNC: 4.1 MMOL/L — SIGNIFICANT CHANGE UP (ref 3.5–5.3)
POTASSIUM SERPL-SCNC: 4.1 MMOL/L — SIGNIFICANT CHANGE UP (ref 3.5–5.3)
PROT SERPL-MCNC: 8.9 GM/DL — HIGH (ref 6–8.3)
RBC # BLD: 5.34 M/UL — SIGNIFICANT CHANGE UP (ref 4.2–5.8)
RBC # FLD: 13.2 % — SIGNIFICANT CHANGE UP (ref 10.3–14.5)
SODIUM SERPL-SCNC: 138 MMOL/L — SIGNIFICANT CHANGE UP (ref 135–145)
SPECIMEN SOURCE: SIGNIFICANT CHANGE UP
VANCOMYCIN TROUGH SERPL-MCNC: 11.6 UG/ML — SIGNIFICANT CHANGE UP (ref 10–20)
WBC # BLD: 8.59 K/UL — SIGNIFICANT CHANGE UP (ref 3.8–10.5)
WBC # FLD AUTO: 8.59 K/UL — SIGNIFICANT CHANGE UP (ref 3.8–10.5)

## 2019-01-26 PROCEDURE — 99233 SBSQ HOSP IP/OBS HIGH 50: CPT

## 2019-01-26 RX ORDER — DIPHENHYDRAMINE HCL 50 MG
50 CAPSULE ORAL ONCE
Qty: 0 | Refills: 0 | Status: COMPLETED | OUTPATIENT
Start: 2019-01-26 | End: 2019-01-26

## 2019-01-26 RX ORDER — QUETIAPINE FUMARATE 200 MG/1
50 TABLET, FILM COATED ORAL EVERY 12 HOURS
Qty: 0 | Refills: 0 | Status: DISCONTINUED | OUTPATIENT
Start: 2019-01-26 | End: 2019-01-30

## 2019-01-26 RX ORDER — HALOPERIDOL DECANOATE 100 MG/ML
5 INJECTION INTRAMUSCULAR EVERY 6 HOURS
Qty: 0 | Refills: 0 | Status: DISCONTINUED | OUTPATIENT
Start: 2019-01-26 | End: 2019-01-31

## 2019-01-26 RX ADMIN — QUETIAPINE FUMARATE 50 MILLIGRAM(S): 200 TABLET, FILM COATED ORAL at 17:39

## 2019-01-26 RX ADMIN — PANTOPRAZOLE SODIUM 40 MILLIGRAM(S): 20 TABLET, DELAYED RELEASE ORAL at 05:06

## 2019-01-26 RX ADMIN — SCOPALAMINE 1 PATCH: 1 PATCH, EXTENDED RELEASE TRANSDERMAL at 07:28

## 2019-01-26 RX ADMIN — Medication 50 MILLIGRAM(S): at 22:51

## 2019-01-26 RX ADMIN — HALOPERIDOL DECANOATE 5 MILLIGRAM(S): 100 INJECTION INTRAMUSCULAR at 20:40

## 2019-01-26 RX ADMIN — Medication 100 MILLIGRAM(S): at 11:31

## 2019-01-26 RX ADMIN — SCOPALAMINE 1 PATCH: 1 PATCH, EXTENDED RELEASE TRANSDERMAL at 23:30

## 2019-01-26 RX ADMIN — Medication 1 MILLIGRAM(S): at 23:38

## 2019-01-26 RX ADMIN — Medication 65 MILLIGRAM(S): at 20:25

## 2019-01-26 RX ADMIN — Medication 65 MILLIGRAM(S): at 17:09

## 2019-01-26 RX ADMIN — Medication 250 MILLIGRAM(S): at 14:45

## 2019-01-26 RX ADMIN — Medication 250 MILLIGRAM(S): at 05:05

## 2019-01-26 RX ADMIN — SODIUM CHLORIDE 50 MILLILITER(S): 9 INJECTION, SOLUTION INTRAVENOUS at 22:43

## 2019-01-26 RX ADMIN — Medication 250 MILLIGRAM(S): at 23:00

## 2019-01-26 RX ADMIN — Medication 62.5 MILLIMOLE(S): at 08:15

## 2019-01-26 RX ADMIN — Medication 1 MILLIGRAM(S): at 11:38

## 2019-01-26 RX ADMIN — PANTOPRAZOLE SODIUM 40 MILLIGRAM(S): 20 TABLET, DELAYED RELEASE ORAL at 17:09

## 2019-01-26 RX ADMIN — CEFEPIME 100 MILLIGRAM(S): 1 INJECTION, POWDER, FOR SOLUTION INTRAMUSCULAR; INTRAVENOUS at 23:00

## 2019-01-26 RX ADMIN — CHLORHEXIDINE GLUCONATE 1 APPLICATION(S): 213 SOLUTION TOPICAL at 05:06

## 2019-01-26 RX ADMIN — SCOPALAMINE 1 PATCH: 1 PATCH, EXTENDED RELEASE TRANSDERMAL at 23:34

## 2019-01-26 RX ADMIN — Medication 65 MILLIGRAM(S): at 11:34

## 2019-01-26 RX ADMIN — CEFEPIME 100 MILLIGRAM(S): 1 INJECTION, POWDER, FOR SOLUTION INTRAMUSCULAR; INTRAVENOUS at 05:05

## 2019-01-26 RX ADMIN — ENOXAPARIN SODIUM 40 MILLIGRAM(S): 100 INJECTION SUBCUTANEOUS at 17:39

## 2019-01-26 RX ADMIN — CEFEPIME 100 MILLIGRAM(S): 1 INJECTION, POWDER, FOR SOLUTION INTRAMUSCULAR; INTRAVENOUS at 14:45

## 2019-01-26 RX ADMIN — Medication 65 MILLIGRAM(S): at 05:07

## 2019-01-26 RX ADMIN — SCOPALAMINE 1 PATCH: 1 PATCH, EXTENDED RELEASE TRANSDERMAL at 19:00

## 2019-01-26 RX ADMIN — SODIUM CHLORIDE 50 MILLILITER(S): 9 INJECTION, SOLUTION INTRAVENOUS at 05:06

## 2019-01-26 NOTE — PROGRESS NOTE ADULT - ASSESSMENT
54 y/o M w/alcohol abuse presenting with alcohol withdrawal with behavioral disturbance requiring high doses of sedatives. now w/MRSA and pseudomonas PNA.    - Wean phenobarb as tolerated  - Continue PRN phenobarb  - Start seroquel  - MVI, thiamine, folate  - Vanc + Cefepime for PNA. Follow up sensitivities Impaired Gait

## 2019-01-26 NOTE — PROGRESS NOTE ADULT - SUBJECTIVE AND OBJECTIVE BOX
HPI:  Pt is a 54 y/o male w/PMH gerd, HLD, etoh abuse presents with complaint of tremors, abdominal pain, nausea, and body aches.  States drinks whiskey regularly cant quantify amount, about 4 days ago started to drink alot more than usual as was w/friends, and states started to get abdominal pains and unable to keep anything down over the last 2 days, last drink 2 days, ago, first reports was just throwing up food material then dark coffee ground like. no melena or brbpr.    pt denies any fever, chills, sob, cp, palpitations, +n/+v/-d/-c no travels or sick cotnacts. (12 Jan 2019 00:23)      24 hr events: Sputum culture positive for MRSA and pseudomonas. Started on Vanc + Cefepime    ## ROS:  [x] unable to obtain    ## Vitals  ICU Vital Signs Last 24 Hrs  T(C): 37.9 (26 Jan 2019 07:45), Max: 37.9 (25 Jan 2019 12:17)  T(F): 100.3 (26 Jan 2019 07:45), Max: 100.3 (26 Jan 2019 07:45)  HR: 109 (26 Jan 2019 08:00) (90 - 109)  BP: 113/79 (26 Jan 2019 08:00) (92/57 - 139/100)  BP(mean): 87 (26 Jan 2019 08:00) (65 - 107)  ABP: --  ABP(mean): --  RR: 13 (26 Jan 2019 08:00) (12 - 22)  SpO2: 96% (26 Jan 2019 08:00) (94% - 100%)      ## Physical Exam:  Gen: Adult male lying in bed, NAD.   HEENT: NC/AT, sclerae anicteric  Resp: No increased WOB, ronchorous breath sounds b/l  CV: S1, S2  Abd: Soft + BS  Ext: No edema  Neuro: Sleepy, but arousable, calm, not coherent, does not follow commands    ## Vent Data      ## Labs:  Chem:  01-26    138  |  103  |  4<L>  ----------------------------<  99  4.1   |  26  |  0.86    Ca    9.0      26 Jan 2019 04:30  Phos  2.6     01-26  Mg     2.8     01-26    TPro  8.9<H>  /  Alb  3.1<L>  /  TBili  0.3  /  DBili  x   /  AST  30  /  ALT  30  /  AlkPhos  48  01-26    LIVER FUNCTIONS - ( 26 Jan 2019 04:30 )  Alb: 3.1 g/dL / Pro: 8.9 gm/dL / ALK PHOS: 48 U/L / ALT: 30 U/L / AST: 30 U/L / GGT: x           CBC:                        15.2   8.59  )-----------( 445      ( 26 Jan 2019 04:30 )             48.2     Coags:          ## Cardiac        ## Blood Gas      #I/Os  I&O's Detail    25 Jan 2019 07:01  -  26 Jan 2019 07:00  --------------------------------------------------------  IN:    dextrose 5%: 1150 mL    Solution: 400 mL    Solution: 500 mL    Vital HN: 1000 mL  Total IN: 3050 mL    OUT:    Indwelling Catheter - Urethral: 2750 mL  Total OUT: 2750 mL    Total NET: 300 mL          ## Imaging:    ## Medications:  MEDICATIONS  (STANDING):  cefepime   IVPB 2000 milliGRAM(s) IV Intermittent every 8 hours  chlorhexidine 4% Liquid 1 Application(s) Topical <User Schedule>  dextrose 5% 1000 milliLiter(s) (50 mL/Hr) IV Continuous <Continuous>  doxazosin 1 milliGRAM(s) Oral at bedtime  enoxaparin Injectable 40 milliGRAM(s) SubCutaneous every 24 hours  folic acid Injectable 1 milliGRAM(s) IV Push daily  influenza   Vaccine 0.5 milliLiter(s) IntraMuscular once  pantoprazole  Injectable 40 milliGRAM(s) IV Push every 12 hours  PHENobarbital Injectable 65 milliGRAM(s) IV Push every 6 hours  QUEtiapine 50 milliGRAM(s) Oral every 12 hours  scopolamine   Patch 1 Patch Transdermal every 72 hours  thiamine Injectable 100 milliGRAM(s) IV Push daily  vancomycin  IVPB      vancomycin  IVPB 1000 milliGRAM(s) IV Intermittent every 8 hours    MEDICATIONS  (PRN):  acetaminophen  Suppository .. 650 milliGRAM(s) Rectal every 6 hours PRN Temp greater or equal to 38C (100.4F)  ondansetron Injectable 4 milliGRAM(s) IV Push every 6 hours PRN Nausea and/or Vomiting  PHENobarbital Injectable 65 milliGRAM(s) IV Push every 15 minutes PRN ciwa >8  PHENobarbital Injectable 130 milliGRAM(s) IV Push every 15 minutes PRN ciwa >20

## 2019-01-27 LAB
ANION GAP SERPL CALC-SCNC: 8 MMOL/L — SIGNIFICANT CHANGE UP (ref 5–17)
BUN SERPL-MCNC: 6 MG/DL — LOW (ref 7–23)
CALCIUM SERPL-MCNC: 8.9 MG/DL — SIGNIFICANT CHANGE UP (ref 8.5–10.1)
CHLORIDE SERPL-SCNC: 104 MMOL/L — SIGNIFICANT CHANGE UP (ref 96–108)
CO2 SERPL-SCNC: 26 MMOL/L — SIGNIFICANT CHANGE UP (ref 22–31)
CREAT SERPL-MCNC: 0.79 MG/DL — SIGNIFICANT CHANGE UP (ref 0.5–1.3)
GLUCOSE SERPL-MCNC: 104 MG/DL — HIGH (ref 70–99)
HCT VFR BLD CALC: 48.2 % — SIGNIFICANT CHANGE UP (ref 39–50)
HGB BLD-MCNC: 15.5 G/DL — SIGNIFICANT CHANGE UP (ref 13–17)
MAGNESIUM SERPL-MCNC: 2.6 MG/DL — SIGNIFICANT CHANGE UP (ref 1.6–2.6)
MCHC RBC-ENTMCNC: 28.7 PG — SIGNIFICANT CHANGE UP (ref 27–34)
MCHC RBC-ENTMCNC: 32.2 GM/DL — SIGNIFICANT CHANGE UP (ref 32–36)
MCV RBC AUTO: 89.3 FL — SIGNIFICANT CHANGE UP (ref 80–100)
NRBC # BLD: 0 /100 WBCS — SIGNIFICANT CHANGE UP (ref 0–0)
PHOSPHATE SERPL-MCNC: 3.5 MG/DL — SIGNIFICANT CHANGE UP (ref 2.5–4.5)
PLATELET # BLD AUTO: 345 K/UL — SIGNIFICANT CHANGE UP (ref 150–400)
POTASSIUM SERPL-MCNC: 4.1 MMOL/L — SIGNIFICANT CHANGE UP (ref 3.5–5.3)
POTASSIUM SERPL-SCNC: 4.1 MMOL/L — SIGNIFICANT CHANGE UP (ref 3.5–5.3)
RBC # BLD: 5.4 M/UL — SIGNIFICANT CHANGE UP (ref 4.2–5.8)
RBC # FLD: 13.4 % — SIGNIFICANT CHANGE UP (ref 10.3–14.5)
SODIUM SERPL-SCNC: 138 MMOL/L — SIGNIFICANT CHANGE UP (ref 135–145)
WBC # BLD: 6.98 K/UL — SIGNIFICANT CHANGE UP (ref 3.8–10.5)
WBC # FLD AUTO: 6.98 K/UL — SIGNIFICANT CHANGE UP (ref 3.8–10.5)

## 2019-01-27 PROCEDURE — 99233 SBSQ HOSP IP/OBS HIGH 50: CPT

## 2019-01-27 RX ORDER — THIAMINE MONONITRATE (VIT B1) 100 MG
100 TABLET ORAL DAILY
Qty: 0 | Refills: 0 | Status: DISCONTINUED | OUTPATIENT
Start: 2019-01-27 | End: 2019-01-31

## 2019-01-27 RX ORDER — DOCUSATE SODIUM 100 MG
100 CAPSULE ORAL
Qty: 0 | Refills: 0 | Status: DISCONTINUED | OUTPATIENT
Start: 2019-01-27 | End: 2019-01-31

## 2019-01-27 RX ORDER — PANTOPRAZOLE SODIUM 20 MG/1
40 TABLET, DELAYED RELEASE ORAL DAILY
Qty: 0 | Refills: 0 | Status: DISCONTINUED | OUTPATIENT
Start: 2019-01-27 | End: 2019-01-31

## 2019-01-27 RX ORDER — POLYETHYLENE GLYCOL 3350 17 G/17G
17 POWDER, FOR SOLUTION ORAL ONCE
Qty: 0 | Refills: 0 | Status: COMPLETED | OUTPATIENT
Start: 2019-01-27 | End: 2019-01-27

## 2019-01-27 RX ORDER — SENNA PLUS 8.6 MG/1
10 TABLET ORAL AT BEDTIME
Qty: 0 | Refills: 0 | Status: DISCONTINUED | OUTPATIENT
Start: 2019-01-27 | End: 2019-01-30

## 2019-01-27 RX ORDER — PHENOBARBITAL 60 MG
64.8 TABLET ORAL EVERY 6 HOURS
Qty: 0 | Refills: 0 | Status: DISCONTINUED | OUTPATIENT
Start: 2019-01-27 | End: 2019-01-31

## 2019-01-27 RX ORDER — VANCOMYCIN HCL 1 G
1250 VIAL (EA) INTRAVENOUS EVERY 8 HOURS
Qty: 0 | Refills: 0 | Status: DISCONTINUED | OUTPATIENT
Start: 2019-01-27 | End: 2019-01-28

## 2019-01-27 RX ORDER — FOLIC ACID 0.8 MG
1 TABLET ORAL DAILY
Qty: 0 | Refills: 0 | Status: DISCONTINUED | OUTPATIENT
Start: 2019-01-27 | End: 2019-01-31

## 2019-01-27 RX ADMIN — Medication 250 MILLIGRAM(S): at 14:26

## 2019-01-27 RX ADMIN — Medication 650 MILLIGRAM(S): at 07:29

## 2019-01-27 RX ADMIN — SCOPALAMINE 1 PATCH: 1 PATCH, EXTENDED RELEASE TRANSDERMAL at 07:28

## 2019-01-27 RX ADMIN — POLYETHYLENE GLYCOL 3350 17 GRAM(S): 17 POWDER, FOR SOLUTION ORAL at 11:27

## 2019-01-27 RX ADMIN — Medication 65 MILLIGRAM(S): at 05:57

## 2019-01-27 RX ADMIN — HALOPERIDOL DECANOATE 5 MILLIGRAM(S): 100 INJECTION INTRAMUSCULAR at 07:29

## 2019-01-27 RX ADMIN — Medication 650 MILLIGRAM(S): at 00:07

## 2019-01-27 RX ADMIN — SCOPALAMINE 1 PATCH: 1 PATCH, EXTENDED RELEASE TRANSDERMAL at 19:10

## 2019-01-27 RX ADMIN — Medication 1 MILLIGRAM(S): at 21:38

## 2019-01-27 RX ADMIN — PANTOPRAZOLE SODIUM 40 MILLIGRAM(S): 20 TABLET, DELAYED RELEASE ORAL at 05:57

## 2019-01-27 RX ADMIN — Medication 650 MILLIGRAM(S): at 20:29

## 2019-01-27 RX ADMIN — ENOXAPARIN SODIUM 40 MILLIGRAM(S): 100 INJECTION SUBCUTANEOUS at 17:18

## 2019-01-27 RX ADMIN — CHLORHEXIDINE GLUCONATE 1 APPLICATION(S): 213 SOLUTION TOPICAL at 05:57

## 2019-01-27 RX ADMIN — CEFEPIME 100 MILLIGRAM(S): 1 INJECTION, POWDER, FOR SOLUTION INTRAMUSCULAR; INTRAVENOUS at 05:56

## 2019-01-27 RX ADMIN — Medication 1 MILLIGRAM(S): at 11:27

## 2019-01-27 RX ADMIN — Medication 100 MILLIGRAM(S): at 11:27

## 2019-01-27 RX ADMIN — Medication 166.67 MILLIGRAM(S): at 22:10

## 2019-01-27 RX ADMIN — Medication 64.8 MILLIGRAM(S): at 23:19

## 2019-01-27 RX ADMIN — CEFEPIME 100 MILLIGRAM(S): 1 INJECTION, POWDER, FOR SOLUTION INTRAMUSCULAR; INTRAVENOUS at 14:26

## 2019-01-27 RX ADMIN — QUETIAPINE FUMARATE 50 MILLIGRAM(S): 200 TABLET, FILM COATED ORAL at 17:18

## 2019-01-27 RX ADMIN — Medication 250 MILLIGRAM(S): at 05:58

## 2019-01-27 RX ADMIN — Medication 64.8 MILLIGRAM(S): at 17:18

## 2019-01-27 RX ADMIN — Medication 650 MILLIGRAM(S): at 01:30

## 2019-01-27 RX ADMIN — Medication 65 MILLIGRAM(S): at 11:27

## 2019-01-27 RX ADMIN — Medication 65 MILLIGRAM(S): at 00:15

## 2019-01-27 RX ADMIN — SENNA PLUS 10 MILLILITER(S): 8.6 TABLET ORAL at 21:38

## 2019-01-27 RX ADMIN — CEFEPIME 100 MILLIGRAM(S): 1 INJECTION, POWDER, FOR SOLUTION INTRAMUSCULAR; INTRAVENOUS at 21:38

## 2019-01-27 RX ADMIN — QUETIAPINE FUMARATE 50 MILLIGRAM(S): 200 TABLET, FILM COATED ORAL at 05:58

## 2019-01-27 RX ADMIN — Medication 100 MILLIGRAM(S): at 17:18

## 2019-01-27 NOTE — PROGRESS NOTE ADULT - ASSESSMENT
52 y/o M w/alcohol abuse presenting with alcohol withdrawal with behavioral disturbance requiring high doses of sedatives. now w/MRSA and pseudomonas PNA.    - Wean phenobarb as tolerated  - Continue PRN phenobarb  - Start seroquel  - MVI, thiamine, folate  - Vanc + Cefepime for PNA. Follow up sensitivities 54 y/o M w/alcohol abuse presenting with alcohol withdrawal with behavioral disturbance requiring high doses of sedatives. now w/MRSA and pseudomonas PNA.    - Wean phenobarb as tolerated  - Continue PRN phenobarb  - Continue seroquel  - MVI, thiamine, folate  - Vanc + Cefepime for PNA. Follow up sensitivities

## 2019-01-27 NOTE — PROGRESS NOTE ADULT - SUBJECTIVE AND OBJECTIVE BOX
HPI:  Pt is a 54 y/o male w/PMH gerd, HLD, etoh abuse presents with complaint of tremors, abdominal pain, nausea, and body aches.  States drinks whiskey regularly cant quantify amount, about 4 days ago started to drink alot more than usual as was w/friends, and states started to get abdominal pains and unable to keep anything down over the last 2 days, last drink 2 days, ago, first reports was just throwing up food material then dark coffee ground like. no melena or brbpr.    pt denies any fever, chills, sob, cp, palpitations, +n/+v/-d/-c no travels or sick cotnacts. (12 Jan 2019 00:23)      24 hr events: No acute events. Remains agitated. Requires frequent suctioning, but improving.    ## ROS:  [x ] unable to obtain      ## Vitals  ICU Vital Signs Last 24 Hrs  T(C): 38.7 (27 Jan 2019 07:05), Max: 38.7 (27 Jan 2019 07:05)  T(F): 101.7 (27 Jan 2019 07:05), Max: 101.7 (27 Jan 2019 07:05)  HR: 95 (27 Jan 2019 09:00) (86 - 118)  BP: 101/62 (27 Jan 2019 09:00) (96/65 - 143/106)  BP(mean): 70 (27 Jan 2019 09:00) (70 - 119)  ABP: --  ABP(mean): --  RR: 14 (27 Jan 2019 09:00) (11 - 22)  SpO2: 89% (27 Jan 2019 09:00) (88% - 100%)      ## Physical Exam:  Gen: Adult male lying in bed, NAD.   HEENT: NC/AT, sclerae anicteric  Resp: No increased WOB, ronchorous breath sounds b/l  CV: S1, S2  Abd: Soft + BS  Ext: No edema  Neuro: Sleepy, but arousable, calm, not coherent, does not follow commands    ## Vent Data      ## Labs:  Chem:  01-27    138  |  104  |  6<L>  ----------------------------<  104<H>  4.1   |  26  |  0.79    Ca    8.9      27 Jan 2019 04:58  Phos  3.5     01-27  Mg     2.6     01-27    TPro  8.9<H>  /  Alb  3.1<L>  /  TBili  0.3  /  DBili  x   /  AST  30  /  ALT  30  /  AlkPhos  48  01-26    LIVER FUNCTIONS - ( 26 Jan 2019 04:30 )  Alb: 3.1 g/dL / Pro: 8.9 gm/dL / ALK PHOS: 48 U/L / ALT: 30 U/L / AST: 30 U/L / GGT: x           CBC:                        15.5   6.98  )-----------( 345      ( 27 Jan 2019 04:58 )             48.2     Coags:          ## Cardiac        ## Blood Gas      #I/Os  I&O's Detail    26 Jan 2019 07:01  -  27 Jan 2019 07:00  --------------------------------------------------------  IN:    dextrose 5%: 1158 mL    Solution: 200 mL    Solution: 750 mL    Solution: 250 mL    Vital HN: 1410 mL  Total IN: 3768 mL    OUT:    Indwelling Catheter - Urethral: 1630 mL  Total OUT: 1630 mL    Total NET: 2138 mL      27 Jan 2019 07:01  -  27 Jan 2019 10:10  --------------------------------------------------------  IN:    dextrose 5%: 50 mL    Vital HN: 60 mL  Total IN: 110 mL    OUT:    Indwelling Catheter - Urethral: 100 mL  Total OUT: 100 mL    Total NET: 10 mL          ## Imaging:    ## Medications:  MEDICATIONS  (STANDING):  cefepime   IVPB 2000 milliGRAM(s) IV Intermittent every 8 hours  chlorhexidine 4% Liquid 1 Application(s) Topical <User Schedule>  dextrose 5% 1000 milliLiter(s) (50 mL/Hr) IV Continuous <Continuous>  doxazosin 1 milliGRAM(s) Oral at bedtime  enoxaparin Injectable 40 milliGRAM(s) SubCutaneous every 24 hours  folic acid Injectable 1 milliGRAM(s) IV Push daily  influenza   Vaccine 0.5 milliLiter(s) IntraMuscular once  pantoprazole  Injectable 40 milliGRAM(s) IV Push every 12 hours  PHENobarbital Injectable 65 milliGRAM(s) IV Push every 6 hours  QUEtiapine 50 milliGRAM(s) Oral every 12 hours  scopolamine   Patch 1 Patch Transdermal every 72 hours  thiamine Injectable 100 milliGRAM(s) IV Push daily  vancomycin  IVPB      vancomycin  IVPB 1000 milliGRAM(s) IV Intermittent every 8 hours    MEDICATIONS  (PRN):  acetaminophen  Suppository .. 650 milliGRAM(s) Rectal every 6 hours PRN Temp greater or equal to 38C (100.4F)  haloperidol    Injectable 5 milliGRAM(s) IntraMuscular every 6 hours PRN Agitation  ondansetron Injectable 4 milliGRAM(s) IV Push every 6 hours PRN Nausea and/or Vomiting  PHENobarbital Injectable 65 milliGRAM(s) IV Push every 15 minutes PRN ciwa >8  PHENobarbital Injectable 130 milliGRAM(s) IV Push every 15 minutes PRN ciwa >20

## 2019-01-27 NOTE — CHART NOTE - NSCHARTNOTEFT_GEN_A_CORE
Assessment: Pt seen for follow-up & continues Acute severe malnutrition. Pt with ETOHA & alcohol withdrawal. Pt remains restless , agitated & confused. Pt on Haldol & Zyprexa for confusion & delirium. Pt continues Abx for possible aspiration pneumonia.     Factors impacting intake: [x ] none [ ] nausea  [ ] vomiting [ ] diarrhea [ ] constipation  [ ]chewing problems [ ] swallowing issues  [ ] other:     Diet Prescription: Diet, NPO with Tube Feed:   Tube Feeding Modality: Nasogastric  Vital AF  Total Volume for 24 Hours (mL): 1440  Continuous  Starting Tube Feed Rate {mL per Hour}: 30  Increase Tube Feed Rate by (mL): 10     Every 8 hours  Until Goal Tube Feed Rate (mL per Hour): 60  Tube Feed Duration (in Hours): 24  Tube Feed Start Time: 14:49 (01-23-19 @ 14:50)    Intake: TF provides 1440ml, 1728kcal, 108gm protein. Pt tolerating NGT feeding. Pt received TF total [id number] x 24 hours. Pt received 86% energy needs & 100% protein needs x 24hours. No BM x 1 week; RN & PA notified. Residuals=0-60ml. Noted recent wt loss due to s/p NPO x 7 days for agitation requiring high does of sedatives.     Current Weight: Wt=73.4kg(1/27), Wt=76.5kg(1/20)  % Weight Change  3.1kg wt loss(4%)     Physical appearance:  +3 edema Valentin arms    Pertinent Medications: MEDICATIONS  (STANDING):  cefepime   IVPB 2000 milliGRAM(s) IV Intermittent every 8 hours  chlorhexidine 4% Liquid 1 Application(s) Topical <User Schedule>  dextrose 5% 1000 milliLiter(s) (50 mL/Hr) IV Continuous <Continuous>  doxazosin 1 milliGRAM(s) Oral at bedtime  enoxaparin Injectable 40 milliGRAM(s) SubCutaneous every 24 hours  folic acid Injectable 1 milliGRAM(s) IV Push daily  influenza   Vaccine 0.5 milliLiter(s) IntraMuscular once  pantoprazole  Injectable 40 milliGRAM(s) IV Push every 12 hours  PHENobarbital Injectable 65 milliGRAM(s) IV Push every 6 hours  QUEtiapine 50 milliGRAM(s) Oral every 12 hours  scopolamine   Patch 1 Patch Transdermal every 72 hours  thiamine Injectable 100 milliGRAM(s) IV Push daily  vancomycin  IVPB      vancomycin  IVPB 1000 milliGRAM(s) IV Intermittent every 8 hours    MEDICATIONS  (PRN):  acetaminophen  Suppository .. 650 milliGRAM(s) Rectal every 6 hours PRN Temp greater or equal to 38C (100.4F)  haloperidol    Injectable 5 milliGRAM(s) IntraMuscular every 6 hours PRN Agitation  ondansetron Injectable 4 milliGRAM(s) IV Push every 6 hours PRN Nausea and/or Vomiting  PHENobarbital Injectable 65 milliGRAM(s) IV Push every 15 minutes PRN ciwa >8  PHENobarbital Injectable 130 milliGRAM(s) IV Push every 15 minutes PRN ciwa >20    Pertinent Labs: 01-27 Na 138 mmol/L Glu 104 mg/dL<H> K+ 4.1 mmol/L Cr 0.79 mg/dL BUN 6 mg/dL<L> Phos 3.5 mg/dL Alb 3.1(1/26)   PAB n/a   Hgb 15.5 g/dL Hct 48.2 % HgA1C n/a    Glucose, Serum: 104 mg/dL <H>   24Hr FS:  Skin: intact    Estimated Needs:   [x ] no change since previous assessment (1/21/19)  [ ] recalculated:     Previous Nutrition Diagnosis:   [ ] Inadequate Energy Intake [ ]Inadequate Oral Intake [ ] Excessive Energy Intake   [ ] Underweight [ ] Increased Nutrient Needs [ ] Overweight/Obesity   [ ] Altered GI Function [ ] Unintended Weight Loss [ ] Food & Nutrition Related Knowledge Deficit [x ] Acute severe Malnutrition [ ] moderate malnutrition    Nutrition Diagnosis is [x ] ongoing  [ ] resolved  [ ] improved  [ ] not applicable   Previous Goal: Pt to meet >75% energy & protein via TF; met    New Nutrition Diagnosis: [ x] not applicable       Interventions:   Recommend  [x ] Continue: Vital AF 1.2 @ 60ml/hr via NGT feeding=1440ml, 1728kcal & 108gm protein  [ ] Change Diet To:  [ ] Nutrition Supplement:  [ ] Nutrition Support:  [ ] Other:     Monitoring and Evaluation:   [ ] PO intake [ x ] Tolerance to diet prescription [ x ] weights [ x ] labs[ x ] follow up per protocol  [ ] other: Assessment: Pt seen for follow-up & continues Acute severe malnutrition. Pt with ETOHA & alcohol withdrawal. Pt remains restless , agitated & confused. Pt on Haldol & Zyprexa for confusion & delirium. Pt continues Abx for possible aspiration pneumonia.     Factors impacting intake: [x ] none [ ] nausea  [ ] vomiting [ ] diarrhea [ ] constipation  [ ]chewing problems [ ] swallowing issues  [ ] other:     Diet Prescription: Diet, NPO with Tube Feed:   Tube Feeding Modality: Nasogastric  Vital AF  Total Volume for 24 Hours (mL): 1440  Continuous  Starting Tube Feed Rate {mL per Hour}: 30  Increase Tube Feed Rate by (mL): 10     Every 8 hours  Until Goal Tube Feed Rate (mL per Hour): 60  Tube Feed Duration (in Hours): 24  Tube Feed Start Time: 14:49 (01-23-19 @ 14:50)    Intake: TF provides 1440ml, 1728kcal, 108gm protein. Pt tolerating NGT feeding. Pt received TF total [id number] x 24 hours. Pt received 86% energy needs & 100% protein needs x 24hours. No BM x 1 week; RN & PA notified & consider Laxative. Residuals=0-60ml. Noted recent wt loss due to s/p NPO x 7 days for agitation requiring high does of sedatives.     Current Weight: Wt=73.4kg(1/27), Wt=76.5kg(1/20)  % Weight Change  3.1kg wt loss(4%)     Physical appearance:  +3 edema Valentin arms    Pertinent Medications: MEDICATIONS  (STANDING):  cefepime   IVPB 2000 milliGRAM(s) IV Intermittent every 8 hours  chlorhexidine 4% Liquid 1 Application(s) Topical <User Schedule>  dextrose 5% 1000 milliLiter(s) (50 mL/Hr) IV Continuous <Continuous>  doxazosin 1 milliGRAM(s) Oral at bedtime  enoxaparin Injectable 40 milliGRAM(s) SubCutaneous every 24 hours  folic acid Injectable 1 milliGRAM(s) IV Push daily  influenza   Vaccine 0.5 milliLiter(s) IntraMuscular once  pantoprazole  Injectable 40 milliGRAM(s) IV Push every 12 hours  PHENobarbital Injectable 65 milliGRAM(s) IV Push every 6 hours  QUEtiapine 50 milliGRAM(s) Oral every 12 hours  scopolamine   Patch 1 Patch Transdermal every 72 hours  thiamine Injectable 100 milliGRAM(s) IV Push daily  vancomycin  IVPB      vancomycin  IVPB 1000 milliGRAM(s) IV Intermittent every 8 hours    MEDICATIONS  (PRN):  acetaminophen  Suppository .. 650 milliGRAM(s) Rectal every 6 hours PRN Temp greater or equal to 38C (100.4F)  haloperidol    Injectable 5 milliGRAM(s) IntraMuscular every 6 hours PRN Agitation  ondansetron Injectable 4 milliGRAM(s) IV Push every 6 hours PRN Nausea and/or Vomiting  PHENobarbital Injectable 65 milliGRAM(s) IV Push every 15 minutes PRN ciwa >8  PHENobarbital Injectable 130 milliGRAM(s) IV Push every 15 minutes PRN ciwa >20    Pertinent Labs: 01-27 Na 138 mmol/L Glu 104 mg/dL<H> K+ 4.1 mmol/L Cr 0.79 mg/dL BUN 6 mg/dL<L> Phos 3.5 mg/dL Alb 3.1(1/26)   PAB n/a   Hgb 15.5 g/dL Hct 48.2 % HgA1C n/a    Glucose, Serum: 104 mg/dL <H>   24Hr FS:  Skin: intact    Estimated Needs:   [x ] no change since previous assessment (1/21/19)  [ ] recalculated:     Previous Nutrition Diagnosis:   [ ] Inadequate Energy Intake [ ]Inadequate Oral Intake [ ] Excessive Energy Intake   [ ] Underweight [ ] Increased Nutrient Needs [ ] Overweight/Obesity   [ ] Altered GI Function [ ] Unintended Weight Loss [ ] Food & Nutrition Related Knowledge Deficit [x ] Acute severe Malnutrition [ ] moderate malnutrition    Nutrition Diagnosis is [x ] ongoing  [ ] resolved  [ ] improved  [ ] not applicable   Previous Goal: Pt to meet >75% energy & protein via TF; met    New Nutrition Diagnosis: [ x] not applicable       Interventions:   Recommend  [x ] Continue: Vital AF 1.2 @ 60ml/hr via NGT feeding=1440ml, 1728kcal & 108gm protein  [ ] Change Diet To:  [ ] Nutrition Supplement:  [ ] Nutrition Support:  [x ] Other:  Consider Laxative    Monitoring and Evaluation:   [ ] PO intake [ x ] Tolerance to diet prescription [ x ] weights [ x ] labs[ x ] follow up per protocol  [ ] other:

## 2019-01-28 LAB
ALBUMIN SERPL ELPH-MCNC: 2.9 G/DL — LOW (ref 3.3–5)
ALP SERPL-CCNC: 42 U/L — SIGNIFICANT CHANGE UP (ref 40–120)
ALT FLD-CCNC: 31 U/L — SIGNIFICANT CHANGE UP (ref 12–78)
ANION GAP SERPL CALC-SCNC: 9 MMOL/L — SIGNIFICANT CHANGE UP (ref 5–17)
AST SERPL-CCNC: 29 U/L — SIGNIFICANT CHANGE UP (ref 15–37)
BASOPHILS # BLD AUTO: 0.11 K/UL — SIGNIFICANT CHANGE UP (ref 0–0.2)
BASOPHILS NFR BLD AUTO: 1.6 % — SIGNIFICANT CHANGE UP (ref 0–2)
BILIRUB SERPL-MCNC: 0.4 MG/DL — SIGNIFICANT CHANGE UP (ref 0.2–1.2)
BUN SERPL-MCNC: 8 MG/DL — SIGNIFICANT CHANGE UP (ref 7–23)
CALCIUM SERPL-MCNC: 9.1 MG/DL — SIGNIFICANT CHANGE UP (ref 8.5–10.1)
CHLORIDE SERPL-SCNC: 103 MMOL/L — SIGNIFICANT CHANGE UP (ref 96–108)
CO2 SERPL-SCNC: 26 MMOL/L — SIGNIFICANT CHANGE UP (ref 22–31)
CREAT SERPL-MCNC: 0.81 MG/DL — SIGNIFICANT CHANGE UP (ref 0.5–1.3)
EOSINOPHIL # BLD AUTO: 0.31 K/UL — SIGNIFICANT CHANGE UP (ref 0–0.5)
EOSINOPHIL NFR BLD AUTO: 4.5 % — SIGNIFICANT CHANGE UP (ref 0–6)
GLUCOSE SERPL-MCNC: 104 MG/DL — HIGH (ref 70–99)
HCT VFR BLD CALC: 40 % — SIGNIFICANT CHANGE UP (ref 39–50)
HGB BLD-MCNC: 13.1 G/DL — SIGNIFICANT CHANGE UP (ref 13–17)
IMM GRANULOCYTES NFR BLD AUTO: 0.7 % — SIGNIFICANT CHANGE UP (ref 0–1.5)
LYMPHOCYTES # BLD AUTO: 1.07 K/UL — SIGNIFICANT CHANGE UP (ref 1–3.3)
LYMPHOCYTES # BLD AUTO: 15.5 % — SIGNIFICANT CHANGE UP (ref 13–44)
MAGNESIUM SERPL-MCNC: 2.8 MG/DL — HIGH (ref 1.6–2.6)
MCHC RBC-ENTMCNC: 29.2 PG — SIGNIFICANT CHANGE UP (ref 27–34)
MCHC RBC-ENTMCNC: 32.8 GM/DL — SIGNIFICANT CHANGE UP (ref 32–36)
MCV RBC AUTO: 89.1 FL — SIGNIFICANT CHANGE UP (ref 80–100)
MONOCYTES # BLD AUTO: 0.85 K/UL — SIGNIFICANT CHANGE UP (ref 0–0.9)
MONOCYTES NFR BLD AUTO: 12.3 % — SIGNIFICANT CHANGE UP (ref 2–14)
NEUTROPHILS # BLD AUTO: 4.51 K/UL — SIGNIFICANT CHANGE UP (ref 1.8–7.4)
NEUTROPHILS NFR BLD AUTO: 65.4 % — SIGNIFICANT CHANGE UP (ref 43–77)
NRBC # BLD: 0 /100 WBCS — SIGNIFICANT CHANGE UP (ref 0–0)
PHOSPHATE SERPL-MCNC: 3 MG/DL — SIGNIFICANT CHANGE UP (ref 2.5–4.5)
PLATELET # BLD AUTO: 598 K/UL — HIGH (ref 150–400)
POTASSIUM SERPL-MCNC: 4 MMOL/L — SIGNIFICANT CHANGE UP (ref 3.5–5.3)
POTASSIUM SERPL-SCNC: 4 MMOL/L — SIGNIFICANT CHANGE UP (ref 3.5–5.3)
PROT SERPL-MCNC: 8.5 GM/DL — HIGH (ref 6–8.3)
RBC # BLD: 4.49 M/UL — SIGNIFICANT CHANGE UP (ref 4.2–5.8)
RBC # FLD: 13.3 % — SIGNIFICANT CHANGE UP (ref 10.3–14.5)
SODIUM SERPL-SCNC: 138 MMOL/L — SIGNIFICANT CHANGE UP (ref 135–145)
WBC # BLD: 6.9 K/UL — SIGNIFICANT CHANGE UP (ref 3.8–10.5)
WBC # FLD AUTO: 6.9 K/UL — SIGNIFICANT CHANGE UP (ref 3.8–10.5)

## 2019-01-28 PROCEDURE — 71045 X-RAY EXAM CHEST 1 VIEW: CPT | Mod: 26

## 2019-01-28 PROCEDURE — 99233 SBSQ HOSP IP/OBS HIGH 50: CPT

## 2019-01-28 PROCEDURE — 99291 CRITICAL CARE FIRST HOUR: CPT

## 2019-01-28 RX ORDER — ACETAMINOPHEN 500 MG
650 TABLET ORAL EVERY 6 HOURS
Qty: 0 | Refills: 0 | Status: DISCONTINUED | OUTPATIENT
Start: 2019-01-28 | End: 2019-01-31

## 2019-01-28 RX ORDER — VANCOMYCIN HCL 1 G
1250 VIAL (EA) INTRAVENOUS EVERY 8 HOURS
Qty: 0 | Refills: 0 | Status: DISCONTINUED | OUTPATIENT
Start: 2019-01-28 | End: 2019-01-28

## 2019-01-28 RX ORDER — LINEZOLID 600 MG/300ML
600 INJECTION, SOLUTION INTRAVENOUS EVERY 12 HOURS
Qty: 0 | Refills: 0 | Status: DISCONTINUED | OUTPATIENT
Start: 2019-01-28 | End: 2019-01-31

## 2019-01-28 RX ADMIN — QUETIAPINE FUMARATE 50 MILLIGRAM(S): 200 TABLET, FILM COATED ORAL at 05:27

## 2019-01-28 RX ADMIN — CEFEPIME 100 MILLIGRAM(S): 1 INJECTION, POWDER, FOR SOLUTION INTRAMUSCULAR; INTRAVENOUS at 13:47

## 2019-01-28 RX ADMIN — SCOPALAMINE 1 PATCH: 1 PATCH, EXTENDED RELEASE TRANSDERMAL at 09:40

## 2019-01-28 RX ADMIN — QUETIAPINE FUMARATE 50 MILLIGRAM(S): 200 TABLET, FILM COATED ORAL at 17:35

## 2019-01-28 RX ADMIN — HALOPERIDOL DECANOATE 5 MILLIGRAM(S): 100 INJECTION INTRAMUSCULAR at 05:26

## 2019-01-28 RX ADMIN — Medication 650 MILLIGRAM(S): at 13:47

## 2019-01-28 RX ADMIN — Medication 64.8 MILLIGRAM(S): at 05:27

## 2019-01-28 RX ADMIN — Medication 650 MILLIGRAM(S): at 06:31

## 2019-01-28 RX ADMIN — Medication 650 MILLIGRAM(S): at 07:39

## 2019-01-28 RX ADMIN — PANTOPRAZOLE SODIUM 40 MILLIGRAM(S): 20 TABLET, DELAYED RELEASE ORAL at 11:56

## 2019-01-28 RX ADMIN — SENNA PLUS 10 MILLILITER(S): 8.6 TABLET ORAL at 22:23

## 2019-01-28 RX ADMIN — Medication 100 MILLIGRAM(S): at 11:56

## 2019-01-28 RX ADMIN — Medication 64.8 MILLIGRAM(S): at 17:35

## 2019-01-28 RX ADMIN — Medication 100 MILLIGRAM(S): at 17:35

## 2019-01-28 RX ADMIN — CEFEPIME 100 MILLIGRAM(S): 1 INJECTION, POWDER, FOR SOLUTION INTRAMUSCULAR; INTRAVENOUS at 22:24

## 2019-01-28 RX ADMIN — Medication 100 MILLIGRAM(S): at 05:27

## 2019-01-28 RX ADMIN — Medication 166.67 MILLIGRAM(S): at 06:06

## 2019-01-28 RX ADMIN — Medication 1 MILLIGRAM(S): at 11:56

## 2019-01-28 RX ADMIN — LINEZOLID 300 MILLIGRAM(S): 600 INJECTION, SOLUTION INTRAVENOUS at 17:36

## 2019-01-28 RX ADMIN — Medication 64.8 MILLIGRAM(S): at 11:56

## 2019-01-28 RX ADMIN — CHLORHEXIDINE GLUCONATE 1 APPLICATION(S): 213 SOLUTION TOPICAL at 04:10

## 2019-01-28 RX ADMIN — Medication 166.67 MILLIGRAM(S): at 13:47

## 2019-01-28 RX ADMIN — Medication 650 MILLIGRAM(S): at 14:45

## 2019-01-28 RX ADMIN — Medication 1 MILLIGRAM(S): at 22:24

## 2019-01-28 RX ADMIN — CEFEPIME 100 MILLIGRAM(S): 1 INJECTION, POWDER, FOR SOLUTION INTRAMUSCULAR; INTRAVENOUS at 05:27

## 2019-01-28 RX ADMIN — ENOXAPARIN SODIUM 40 MILLIGRAM(S): 100 INJECTION SUBCUTANEOUS at 17:36

## 2019-01-28 NOTE — PROGRESS NOTE ADULT - SUBJECTIVE AND OBJECTIVE BOX
Patient is a 53y old  Male who presents with a chief complaint of n/v (28 Jan 2019 16:58)      INTERVAL HPI / OVERNIGHT EVENTS:    MEDICATIONS  (STANDING):  cefepime   IVPB 2000 milliGRAM(s) IV Intermittent every 8 hours  chlorhexidine 4% Liquid 1 Application(s) Topical <User Schedule>  docusate sodium Liquid 100 milliGRAM(s) Oral two times a day  doxazosin 1 milliGRAM(s) Oral at bedtime  enoxaparin Injectable 40 milliGRAM(s) SubCutaneous every 24 hours  folic acid 1 milliGRAM(s) Oral daily  influenza   Vaccine 0.5 milliLiter(s) IntraMuscular once  linezolid  IVPB 600 milliGRAM(s) IV Intermittent every 12 hours  pantoprazole   Suspension 40 milliGRAM(s) Oral daily  PHENobarbital 64.8 milliGRAM(s) Oral every 6 hours  QUEtiapine 50 milliGRAM(s) Oral every 12 hours  scopolamine   Patch 1 Patch Transdermal every 72 hours  senna Syrup 10 milliLiter(s) Oral at bedtime  thiamine 100 milliGRAM(s) Oral daily    MEDICATIONS  (PRN):  acetaminophen   Tablet .. 650 milliGRAM(s) Oral every 6 hours PRN Temp greater or equal to 38C (100.4F)  haloperidol    Injectable 5 milliGRAM(s) IntraMuscular every 6 hours PRN Agitation  ondansetron Injectable 4 milliGRAM(s) IV Push every 6 hours PRN Nausea and/or Vomiting      Vital Signs Last 24 Hrs  T(C): 37.7 (28 Jan 2019 19:16), Max: 38.3 (28 Jan 2019 07:41)  T(F): 99.8 (28 Jan 2019 19:16), Max: 101 (28 Jan 2019 07:41)  HR: 98 (28 Jan 2019 22:00) (84 - 122)  BP: 129/83 (28 Jan 2019 22:00) (99/72 - 139/83)  BP(mean): 91 (28 Jan 2019 22:00) (66 - 94)  RR: 16 (28 Jan 2019 22:00) (12 - 25)  SpO2: 100% (28 Jan 2019 22:00) (85% - 100%)    Review of systems:  General : no fever /chills,fatigue  CVS : no chest pain, palpitations  Lungs : no shortness of breath, cough  GI : no abdominal pain,vomiting, diarrhea   : no dysuria,hematuria        PHYSICAL EXAM:  General :NAD  Constitutional:  well-groomed, well-developed  Respiratory: CTAB/L  Cardiovascular: S1 and S2, RRR, no M/G/R  Gastrointestinal: BS+, soft, NT/ND  Extremities: No peripheral edema  Vascular: 2+ peripheral pulses  Skin: No rashes      LABS:                        13.1   6.90  )-----------( 598      ( 28 Jan 2019 04:20 )             40.0     01-28    138  |  103  |  8   ----------------------------<  104<H>  4.0   |  26  |  0.81    Ca    9.1      28 Jan 2019 04:20  Phos  3.0     01-28  Mg     2.8     01-28    TPro  8.5<H>  /  Alb  2.9<L>  /  TBili  0.4  /  DBili  x   /  AST  29  /  ALT  31  /  AlkPhos  42  01-28          MICROBIOLOGY:  RECENT CULTURES:  01-26 .Blood XXXX XXXX   No growth to date.    01-25 .Blood None aerobic XXXX XXXX   No growth to date.    01-23 .Sputum Sputum/ trap Methicillin resistant Staphylococcus aureus  Pseudomonas aeruginosa   Rare polymorphonuclear leukocytes seen per low power field  Moderate Squamous epithelial cells seen per low power field  Moderate Gram Positive Cocci in Clusters seen per oil power field  Results consistent with oropharyngeal contamination   Numerous Methicillin resistant Staphylococcus aureus  Few Pseudomonas aeruginosa  Normal Respiratory Melanie present          RADIOLOGY & ADDITIONAL STUDIES: Patient is a 53y old  Male who presents with a chief complaint of n/v (28 Jan 2019 16:58)      INTERVAL HPI / OVERNIGHT EVENTS: more awake     MEDICATIONS  (STANDING):  cefepime   IVPB 2000 milliGRAM(s) IV Intermittent every 8 hours  chlorhexidine 4% Liquid 1 Application(s) Topical <User Schedule>  docusate sodium Liquid 100 milliGRAM(s) Oral two times a day  doxazosin 1 milliGRAM(s) Oral at bedtime  enoxaparin Injectable 40 milliGRAM(s) SubCutaneous every 24 hours  folic acid 1 milliGRAM(s) Oral daily  influenza   Vaccine 0.5 milliLiter(s) IntraMuscular once  linezolid  IVPB 600 milliGRAM(s) IV Intermittent every 12 hours  pantoprazole   Suspension 40 milliGRAM(s) Oral daily  PHENobarbital 64.8 milliGRAM(s) Oral every 6 hours  QUEtiapine 50 milliGRAM(s) Oral every 12 hours  scopolamine   Patch 1 Patch Transdermal every 72 hours  senna Syrup 10 milliLiter(s) Oral at bedtime  thiamine 100 milliGRAM(s) Oral daily    MEDICATIONS  (PRN):  acetaminophen   Tablet .. 650 milliGRAM(s) Oral every 6 hours PRN Temp greater or equal to 38C (100.4F)  haloperidol    Injectable 5 milliGRAM(s) IntraMuscular every 6 hours PRN Agitation  ondansetron Injectable 4 milliGRAM(s) IV Push every 6 hours PRN Nausea and/or Vomiting      Vital Signs Last 24 Hrs  T(C): 37.7 (28 Jan 2019 19:16), Max: 38.3 (28 Jan 2019 07:41)  T(F): 99.8 (28 Jan 2019 19:16), Max: 101 (28 Jan 2019 07:41)  HR: 98 (28 Jan 2019 22:00) (84 - 122)  BP: 129/83 (28 Jan 2019 22:00) (99/72 - 139/83)  BP(mean): 91 (28 Jan 2019 22:00) (66 - 94)  RR: 16 (28 Jan 2019 22:00) (12 - 25)  SpO2: 100% (28 Jan 2019 22:00) (85% - 100%)    Review of systems:  General :+  fever /chills, ++fatigue  CVS : no chest pain, palpitations  Lungs :+ shortness of breath, +cough  GI : no abdominal pain, vomiting, diarrhea   : no dysuria, hematuria        PHYSICAL EXAM:  General :NAD  Constitutional:  well-groomed, well-developed  Respiratory: Crackles in all lung fields+  Cardiovascular: S1 and S2, RRR, no M/G/R  Gastrointestinal: BS+, soft, NT/ND  Extremities: No peripheral edema  Vascular: 2+ peripheral pulses  Skin: No rashes      LABS:                        13.1   6.90  )-----------( 598      ( 28 Jan 2019 04:20 )             40.0     01-28    138  |  103  |  8   ----------------------------<  104<H>  4.0   |  26  |  0.81    Ca    9.1      28 Jan 2019 04:20  Phos  3.0     01-28  Mg     2.8     01-28    TPro  8.5<H>  /  Alb  2.9<L>  /  TBili  0.4  /  DBili  x   /  AST  29  /  ALT  31  /  AlkPhos  42  01-28          MICROBIOLOGY:  RECENT CULTURES:  01-26 .Blood XXXX XXXX   No growth to date.    01-25 .Blood None aerobic XXXX XXXX   No growth to date.    01-23 .Sputum Sputum/ trap Methicillin resistant Staphylococcus aureus  Pseudomonas aeruginosa   Rare polymorphonuclear leukocytes seen per low power field  Moderate Squamous epithelial cells seen per low power field  Moderate Gram Positive Cocci in Clusters seen per oil power field  Results consistent with oropharyngeal contamination   Numerous Methicillin resistant Staphylococcus aureus  Few Pseudomonas aeruginosa  Normal Respiratory Melanie present          RADIOLOGY & ADDITIONAL STUDIES:

## 2019-01-28 NOTE — PROGRESS NOTE ADULT - ASSESSMENT
54 y/o M w/alcohol abuse presenting with alcohol withdrawal with behavioral disturbance requiring high doses of sedatives. Course c/b MRSA and pseudomonas + sputum cx ?PNA    Neuro  - Wean phenobarb as tolerated  - Continue PRN phenobarb  - Continue seroquel  - MVI, thiamine, folate  -cont CIWA    Pulm  - Vanc + Cefepime for PNA. Follow up sensitivities  -keep in ICU today for frequent suctioning

## 2019-01-28 NOTE — PROGRESS NOTE ADULT - SUBJECTIVE AND OBJECTIVE BOX
HPI:  Pt is a 54 y/o male w/PMH gerd, HLD, etoh abuse presents with complaint of tremors, abdominal pain, nausea, and body aches.  States drinks whiskey regularly cant quantify amount, about 4 days ago started to drink alot more than usual as was w/friends, and states started to get abdominal pains and unable to keep anything down over the last 2 days, last drink 2 days, ago, first reports was just throwing up food material then dark coffee ground like. no melena or brbpr.    pt denies any fever, chills, sob, cp, palpitations, +n/+v/-d/-c no travels or sick cotnacts. (12 Jan 2019 00:23)      24 hr events:   Less agitated than prior   Still Requires frequent suctioning    ## ROS:  [x ] unable to obtain      ICU Vital Signs Last 24 Hrs  T(C): 38.3 (28 Jan 2019 15:07), Max: 38.6 (27 Jan 2019 19:27)  T(F): 101 (28 Jan 2019 15:07), Max: 101.5 (27 Jan 2019 19:27)  HR: 93 (28 Jan 2019 16:00) (87 - 122)  BP: 122/83 (28 Jan 2019 16:00) (100/58 - 139/83)  BP(mean): 91 (28 Jan 2019 16:00) (66 - 94)  ABP: --  ABP(mean): --  RR: 17 (28 Jan 2019 16:00) (12 - 25)  SpO2: 85% (28 Jan 2019 16:00) (71% - 100%)        ## Physical Exam:  Gen: Adult male lying in bed, NAD.   HEENT: NC/AT, sclerae anicteric  Resp: No increased WOB, b/l rhonchi  CV: S1, S2  Abd: Soft + BS  Ext: No edema  Neuro: awake and calm, not coherent, does not follow commands                          13.1   6.90  )-----------( 598      ( 28 Jan 2019 04:20 )             40.0     01-28    138  |  103  |  8   ----------------------------<  104<H>  4.0   |  26  |  0.81    Ca    9.1      28 Jan 2019 04:20  Phos  3.0     01-28  Mg     2.8     01-28    TPro  8.5<H>  /  Alb  2.9<L>  /  TBili  0.4  /  DBili  x   /  AST  29  /  ALT  31  /  AlkPhos  42  01-28    MEDICATIONS  (STANDING):  cefepime   IVPB 2000 milliGRAM(s) IV Intermittent every 8 hours  chlorhexidine 4% Liquid 1 Application(s) Topical <User Schedule>  docusate sodium Liquid 100 milliGRAM(s) Oral two times a day  doxazosin 1 milliGRAM(s) Oral at bedtime  enoxaparin Injectable 40 milliGRAM(s) SubCutaneous every 24 hours  folic acid 1 milliGRAM(s) Oral daily  influenza   Vaccine 0.5 milliLiter(s) IntraMuscular once  linezolid  IVPB 600 milliGRAM(s) IV Intermittent every 12 hours  pantoprazole   Suspension 40 milliGRAM(s) Oral daily  PHENobarbital 64.8 milliGRAM(s) Oral every 6 hours  QUEtiapine 50 milliGRAM(s) Oral every 12 hours  scopolamine   Patch 1 Patch Transdermal every 72 hours  senna Syrup 10 milliLiter(s) Oral at bedtime  thiamine 100 milliGRAM(s) Oral daily

## 2019-01-28 NOTE — PROGRESS NOTE ADULT - SUBJECTIVE AND OBJECTIVE BOX
HPI:  Pt is a 52 y/o male w/PMH gerd, HLD, etoh abuse presents with complaint of tremors, abdominal pain, nausea, and body aches.  States drinks whiskey regularly cant quantify amount, about 4 days ago started to drink alot more than usual as was w/friends, and states started to get abdominal pains and unable to keep anything down over the last 2 days, last drink 2 days, ago, first reports was just throwing up food material then dark coffee ground like. no melena or brbpr.    pt denies any fever, chills, sob, cp, palpitations, +n/+v/-d/-c no travels or sick cotnacts. (12 Jan 2019 00:23)      24 hr events:   Less agitated than prior   Still Requires frequent suctioning    ## ROS:  [x ] unable to obtain      ICU Vital Signs Last 24 Hrs  T(C): 38.3 (28 Jan 2019 15:07), Max: 38.6 (27 Jan 2019 19:27)  T(F): 101 (28 Jan 2019 15:07), Max: 101.5 (27 Jan 2019 19:27)  HR: 93 (28 Jan 2019 16:00) (87 - 122)  BP: 122/83 (28 Jan 2019 16:00) (100/58 - 139/83)  BP(mean): 91 (28 Jan 2019 16:00) (66 - 94)  ABP: --  ABP(mean): --  RR: 17 (28 Jan 2019 16:00) (12 - 25)  SpO2: 85% (28 Jan 2019 16:00) (71% - 100%)        ## Physical Exam:  Gen: Adult male lying in bed, NAD.   HEENT: NC/AT, sclerae anicteric  Resp: No increased WOB, b/l rhonchi  CV: S1, S2  Abd: Soft + BS  Ext: No edema  Neuro: awake and calm, not coherent, does not follow commands                          13.1   6.90  )-----------( 598      ( 28 Jan 2019 04:20 )             40.0     01-28    138  |  103  |  8   ----------------------------<  104<H>  4.0   |  26  |  0.81    Ca    9.1      28 Jan 2019 04:20  Phos  3.0     01-28  Mg     2.8     01-28    TPro  8.5<H>  /  Alb  2.9<L>  /  TBili  0.4  /  DBili  x   /  AST  29  /  ALT  31  /  AlkPhos  42  01-28    MEDICATIONS  (STANDING):  cefepime   IVPB 2000 milliGRAM(s) IV Intermittent every 8 hours  chlorhexidine 4% Liquid 1 Application(s) Topical <User Schedule>  docusate sodium Liquid 100 milliGRAM(s) Oral two times a day  doxazosin 1 milliGRAM(s) Oral at bedtime  enoxaparin Injectable 40 milliGRAM(s) SubCutaneous every 24 hours  folic acid 1 milliGRAM(s) Oral daily  influenza   Vaccine 0.5 milliLiter(s) IntraMuscular once  linezolid  IVPB 600 milliGRAM(s) IV Intermittent every 12 hours  pantoprazole   Suspension 40 milliGRAM(s) Oral daily  PHENobarbital 64.8 milliGRAM(s) Oral every 6 hours  QUEtiapine 50 milliGRAM(s) Oral every 12 hours  scopolamine   Patch 1 Patch Transdermal every 72 hours  senna Syrup 10 milliLiter(s) Oral at bedtime  thiamine 100 milliGRAM(s) Oral daily

## 2019-01-29 DIAGNOSIS — R50.9 FEVER, UNSPECIFIED: ICD-10-CM

## 2019-01-29 DIAGNOSIS — J15.6 PNEUMONIA DUE TO OTHER GRAM-NEGATIVE BACTERIA: ICD-10-CM

## 2019-01-29 DIAGNOSIS — J15.212 PNEUMONIA DUE TO METHICILLIN RESISTANT STAPHYLOCOCCUS AUREUS: ICD-10-CM

## 2019-01-29 LAB
ANION GAP SERPL CALC-SCNC: 8 MMOL/L — SIGNIFICANT CHANGE UP (ref 5–17)
BUN SERPL-MCNC: 10 MG/DL — SIGNIFICANT CHANGE UP (ref 7–23)
CALCIUM SERPL-MCNC: 8.9 MG/DL — SIGNIFICANT CHANGE UP (ref 8.5–10.1)
CHLORIDE SERPL-SCNC: 102 MMOL/L — SIGNIFICANT CHANGE UP (ref 96–108)
CO2 SERPL-SCNC: 29 MMOL/L — SIGNIFICANT CHANGE UP (ref 22–31)
CREAT SERPL-MCNC: 0.83 MG/DL — SIGNIFICANT CHANGE UP (ref 0.5–1.3)
GLUCOSE SERPL-MCNC: 91 MG/DL — SIGNIFICANT CHANGE UP (ref 70–99)
HCT VFR BLD CALC: 44.1 % — SIGNIFICANT CHANGE UP (ref 39–50)
HGB BLD-MCNC: 13.8 G/DL — SIGNIFICANT CHANGE UP (ref 13–17)
MAGNESIUM SERPL-MCNC: 2.8 MG/DL — HIGH (ref 1.6–2.6)
MCHC RBC-ENTMCNC: 28.6 PG — SIGNIFICANT CHANGE UP (ref 27–34)
MCHC RBC-ENTMCNC: 31.3 GM/DL — LOW (ref 32–36)
MCV RBC AUTO: 91.3 FL — SIGNIFICANT CHANGE UP (ref 80–100)
NRBC # BLD: 0 /100 WBCS — SIGNIFICANT CHANGE UP (ref 0–0)
PHOSPHATE SERPL-MCNC: 3.1 MG/DL — SIGNIFICANT CHANGE UP (ref 2.5–4.5)
PLATELET # BLD AUTO: 599 K/UL — HIGH (ref 150–400)
POTASSIUM SERPL-MCNC: 3.9 MMOL/L — SIGNIFICANT CHANGE UP (ref 3.5–5.3)
POTASSIUM SERPL-SCNC: 3.9 MMOL/L — SIGNIFICANT CHANGE UP (ref 3.5–5.3)
RBC # BLD: 4.83 M/UL — SIGNIFICANT CHANGE UP (ref 4.2–5.8)
RBC # FLD: 13.2 % — SIGNIFICANT CHANGE UP (ref 10.3–14.5)
SODIUM SERPL-SCNC: 139 MMOL/L — SIGNIFICANT CHANGE UP (ref 135–145)
WBC # BLD: 7.55 K/UL — SIGNIFICANT CHANGE UP (ref 3.8–10.5)
WBC # FLD AUTO: 7.55 K/UL — SIGNIFICANT CHANGE UP (ref 3.8–10.5)

## 2019-01-29 PROCEDURE — 99233 SBSQ HOSP IP/OBS HIGH 50: CPT

## 2019-01-29 RX ADMIN — Medication 100 MILLIGRAM(S): at 12:16

## 2019-01-29 RX ADMIN — QUETIAPINE FUMARATE 50 MILLIGRAM(S): 200 TABLET, FILM COATED ORAL at 18:30

## 2019-01-29 RX ADMIN — SCOPALAMINE 1 PATCH: 1 PATCH, EXTENDED RELEASE TRANSDERMAL at 23:11

## 2019-01-29 RX ADMIN — Medication 100 MILLIGRAM(S): at 05:56

## 2019-01-29 RX ADMIN — Medication 1 MILLIGRAM(S): at 12:16

## 2019-01-29 RX ADMIN — CHLORHEXIDINE GLUCONATE 1 APPLICATION(S): 213 SOLUTION TOPICAL at 02:30

## 2019-01-29 RX ADMIN — LINEZOLID 300 MILLIGRAM(S): 600 INJECTION, SOLUTION INTRAVENOUS at 06:32

## 2019-01-29 RX ADMIN — CEFEPIME 100 MILLIGRAM(S): 1 INJECTION, POWDER, FOR SOLUTION INTRAMUSCULAR; INTRAVENOUS at 21:17

## 2019-01-29 RX ADMIN — QUETIAPINE FUMARATE 50 MILLIGRAM(S): 200 TABLET, FILM COATED ORAL at 05:56

## 2019-01-29 RX ADMIN — ENOXAPARIN SODIUM 40 MILLIGRAM(S): 100 INJECTION SUBCUTANEOUS at 18:17

## 2019-01-29 RX ADMIN — Medication 100 MILLIGRAM(S): at 18:17

## 2019-01-29 RX ADMIN — PANTOPRAZOLE SODIUM 40 MILLIGRAM(S): 20 TABLET, DELAYED RELEASE ORAL at 12:16

## 2019-01-29 RX ADMIN — Medication 1 MILLIGRAM(S): at 21:17

## 2019-01-29 RX ADMIN — Medication 64.8 MILLIGRAM(S): at 12:16

## 2019-01-29 RX ADMIN — Medication 64.8 MILLIGRAM(S): at 00:56

## 2019-01-29 RX ADMIN — Medication 64.8 MILLIGRAM(S): at 05:56

## 2019-01-29 RX ADMIN — Medication 64.8 MILLIGRAM(S): at 18:17

## 2019-01-29 RX ADMIN — CEFEPIME 100 MILLIGRAM(S): 1 INJECTION, POWDER, FOR SOLUTION INTRAMUSCULAR; INTRAVENOUS at 15:17

## 2019-01-29 RX ADMIN — SENNA PLUS 10 MILLILITER(S): 8.6 TABLET ORAL at 21:17

## 2019-01-29 RX ADMIN — Medication 650 MILLIGRAM(S): at 12:17

## 2019-01-29 RX ADMIN — LINEZOLID 300 MILLIGRAM(S): 600 INJECTION, SOLUTION INTRAVENOUS at 18:17

## 2019-01-29 RX ADMIN — SCOPALAMINE 1 PATCH: 1 PATCH, EXTENDED RELEASE TRANSDERMAL at 09:39

## 2019-01-29 RX ADMIN — CEFEPIME 100 MILLIGRAM(S): 1 INJECTION, POWDER, FOR SOLUTION INTRAMUSCULAR; INTRAVENOUS at 05:56

## 2019-01-29 RX ADMIN — SCOPALAMINE 1 PATCH: 1 PATCH, EXTENDED RELEASE TRANSDERMAL at 19:30

## 2019-01-29 RX ADMIN — HALOPERIDOL DECANOATE 5 MILLIGRAM(S): 100 INJECTION INTRAMUSCULAR at 07:15

## 2019-01-29 NOTE — PROGRESS NOTE ADULT - ASSESSMENT
52 y/o M w/alcohol abuse presenting with alcohol withdrawal with behavioral disturbance requiring high doses of sedatives. Course c/b MRSA and pseudomonas + sputum cx ?PNA. Suctioning requriements decreased significantly and pt much more calm. plan for floor transfer.     Neuro  - Wean phenobarb as tolerated  - Continue PRN phenobarb  - prn haldol for agitation  - Continue seroquel  - MVI, thiamine, folate  -cont CIWA    Pulm  - Vanc + Cefepime for PNA. Follow up sensitivities  - suctioning improved

## 2019-01-29 NOTE — CHART NOTE - NSCHARTNOTEFT_GEN_A_CORE
Pt medically stable for transfer to medical floor.     54 y/o M w/ alcohol abuse, DT, GERD presenting with alcohol withdrawal with behavioral disturbance requiring high doses of sedatives and precedex infusion.   Course c/b MRSA and pseudomonas pna and urinary retention.  Pt being seen by ID for antibiotics.  Pt still with episodes of agitation.  Haldol ordered prn and phenobarbital titrated to PO (via NGT).  Will need to titrate phenobarb slowly. Pt medically stable for transfer to medical floor.  Signed out to Dr. Cartagena    52 y/o M w/ alcohol abuse, DT, GERD presenting with alcohol withdrawal with behavioral disturbance requiring high doses of sedatives and precedex infusion.   Course c/b MRSA and pseudomonas pna and urinary retention.  Pt being seen by ID for antibiotics.  Pt still with episodes of agitation.  Haldol ordered prn and phenobarbital titrated to PO (via NGT).  Will need to titrate phenobarb slowly.

## 2019-01-29 NOTE — PROGRESS NOTE ADULT - SUBJECTIVE AND OBJECTIVE BOX
HPI:  Pt is a 52 y/o male w/PMH gerd, HLD, etoh abuse presents with complaint of tremors, abdominal pain, nausea, and body aches.  States drinks whiskey regularly cant quantify amount, about 4 days ago started to drink alot more than usual as was w/friends, and states started to get abdominal pains and unable to keep anything down over the last 2 days, last drink 2 days, ago, first reports was just throwing up food material then dark coffee ground like. no melena or brbpr.    pt denies any fever, chills, sob, cp, palpitations, +n/+v/-d/-c no travels or sick cotnacts. (12 Jan 2019 00:23)      24 hr events:   Less agitated than prior but overnight was agitated requiring haldol  suctioning requirements decreased    ## ROS:  [x ] unable to obtain       ICU Vital Signs Last 24 Hrs  T(C): 37.6 (29 Jan 2019 15:44), Max: 38.3 (29 Jan 2019 08:32)  T(F): 99.6 (29 Jan 2019 15:44), Max: 101 (29 Jan 2019 08:32)  HR: 88 (29 Jan 2019 11:00) (84 - 109)  BP: 107/56 (29 Jan 2019 11:00) (99/72 - 157/89)  BP(mean): 68 (29 Jan 2019 11:00) (68 - 104)  ABP: --  ABP(mean): --  RR: 15 (29 Jan 2019 11:00) (13 - 28)  SpO2: 100% (29 Jan 2019 11:00) (94% - 100%)        ## Physical Exam:  Gen: Adult male lying in bed, NAD.   HEENT: NC/AT, sclerae anicteric  Resp: No increased WOB, b/l rhonchi  CV: S1, S2  Abd: Soft + BS  Ext: No edema  Neuro: awake and calm, not coherent, does not follow commands                            13.8   7.55  )-----------( 599      ( 29 Jan 2019 04:09 )             44.1   01-29    139  |  102  |  10  ----------------------------<  91  3.9   |  29  |  0.83    Ca    8.9      29 Jan 2019 04:09  Phos  3.1     01-29  Mg     2.8     01-29    TPro  8.5<H>  /  Alb  2.9<L>  /  TBili  0.4  /  DBili  x   /  AST  29  /  ALT  31  /  AlkPhos  42  01-28    MEDICATIONS  (STANDING):  cefepime   IVPB 2000 milliGRAM(s) IV Intermittent every 8 hours  docusate sodium Liquid 100 milliGRAM(s) Oral two times a day  doxazosin 1 milliGRAM(s) Oral at bedtime  enoxaparin Injectable 40 milliGRAM(s) SubCutaneous every 24 hours  folic acid 1 milliGRAM(s) Oral daily  influenza   Vaccine 0.5 milliLiter(s) IntraMuscular once  linezolid  IVPB 600 milliGRAM(s) IV Intermittent every 12 hours  pantoprazole   Suspension 40 milliGRAM(s) Oral daily  PHENobarbital 64.8 milliGRAM(s) Oral every 6 hours  QUEtiapine 50 milliGRAM(s) Oral every 12 hours  scopolamine   Patch 1 Patch Transdermal every 72 hours  senna Syrup 10 milliLiter(s) Oral at bedtime  thiamine 100 milliGRAM(s) Oral daily

## 2019-01-30 DIAGNOSIS — R13.10 DYSPHAGIA, UNSPECIFIED: ICD-10-CM

## 2019-01-30 DIAGNOSIS — G93.41 METABOLIC ENCEPHALOPATHY: ICD-10-CM

## 2019-01-30 DIAGNOSIS — K29.70 GASTRITIS, UNSPECIFIED, WITHOUT BLEEDING: ICD-10-CM

## 2019-01-30 DIAGNOSIS — F10.239 ALCOHOL DEPENDENCE WITH WITHDRAWAL, UNSPECIFIED: ICD-10-CM

## 2019-01-30 LAB
ALBUMIN SERPL ELPH-MCNC: 3 G/DL — LOW (ref 3.3–5)
ALP SERPL-CCNC: 35 U/L — LOW (ref 40–120)
ALT FLD-CCNC: 29 U/L — SIGNIFICANT CHANGE UP (ref 12–78)
ANION GAP SERPL CALC-SCNC: 11 MMOL/L — SIGNIFICANT CHANGE UP (ref 5–17)
AST SERPL-CCNC: 36 U/L — SIGNIFICANT CHANGE UP (ref 15–37)
BASOPHILS # BLD AUTO: 0.1 K/UL — SIGNIFICANT CHANGE UP (ref 0–0.2)
BASOPHILS NFR BLD AUTO: 2 % — SIGNIFICANT CHANGE UP (ref 0–2)
BILIRUB SERPL-MCNC: 0.3 MG/DL — SIGNIFICANT CHANGE UP (ref 0.2–1.2)
BUN SERPL-MCNC: 14 MG/DL — SIGNIFICANT CHANGE UP (ref 7–23)
CALCIUM SERPL-MCNC: 9 MG/DL — SIGNIFICANT CHANGE UP (ref 8.5–10.1)
CHLORIDE SERPL-SCNC: 104 MMOL/L — SIGNIFICANT CHANGE UP (ref 96–108)
CO2 SERPL-SCNC: 25 MMOL/L — SIGNIFICANT CHANGE UP (ref 22–31)
CREAT SERPL-MCNC: 0.8 MG/DL — SIGNIFICANT CHANGE UP (ref 0.5–1.3)
CULTURE RESULTS: SIGNIFICANT CHANGE UP
EOSINOPHIL # BLD AUTO: 0.26 K/UL — SIGNIFICANT CHANGE UP (ref 0–0.5)
EOSINOPHIL NFR BLD AUTO: 5.2 % — SIGNIFICANT CHANGE UP (ref 0–6)
GLUCOSE SERPL-MCNC: 105 MG/DL — HIGH (ref 70–99)
HCT VFR BLD CALC: 42.9 % — SIGNIFICANT CHANGE UP (ref 39–50)
HGB BLD-MCNC: 13.7 G/DL — SIGNIFICANT CHANGE UP (ref 13–17)
IMM GRANULOCYTES NFR BLD AUTO: 0.6 % — SIGNIFICANT CHANGE UP (ref 0–1.5)
LYMPHOCYTES # BLD AUTO: 1.12 K/UL — SIGNIFICANT CHANGE UP (ref 1–3.3)
LYMPHOCYTES # BLD AUTO: 22.4 % — SIGNIFICANT CHANGE UP (ref 13–44)
MAGNESIUM SERPL-MCNC: 2.7 MG/DL — HIGH (ref 1.6–2.6)
MCHC RBC-ENTMCNC: 28.5 PG — SIGNIFICANT CHANGE UP (ref 27–34)
MCHC RBC-ENTMCNC: 31.9 GM/DL — LOW (ref 32–36)
MCV RBC AUTO: 89.2 FL — SIGNIFICANT CHANGE UP (ref 80–100)
MONOCYTES # BLD AUTO: 0.61 K/UL — SIGNIFICANT CHANGE UP (ref 0–0.9)
MONOCYTES NFR BLD AUTO: 12.2 % — SIGNIFICANT CHANGE UP (ref 2–14)
NEUTROPHILS # BLD AUTO: 2.88 K/UL — SIGNIFICANT CHANGE UP (ref 1.8–7.4)
NEUTROPHILS NFR BLD AUTO: 57.6 % — SIGNIFICANT CHANGE UP (ref 43–77)
NRBC # BLD: 0 /100 WBCS — SIGNIFICANT CHANGE UP (ref 0–0)
PHOSPHATE SERPL-MCNC: 3.3 MG/DL — SIGNIFICANT CHANGE UP (ref 2.5–4.5)
PLATELET # BLD AUTO: 662 K/UL — HIGH (ref 150–400)
POTASSIUM SERPL-MCNC: 4 MMOL/L — SIGNIFICANT CHANGE UP (ref 3.5–5.3)
POTASSIUM SERPL-SCNC: 4 MMOL/L — SIGNIFICANT CHANGE UP (ref 3.5–5.3)
PROT SERPL-MCNC: 8.8 GM/DL — HIGH (ref 6–8.3)
RBC # BLD: 4.81 M/UL — SIGNIFICANT CHANGE UP (ref 4.2–5.8)
RBC # FLD: 13.2 % — SIGNIFICANT CHANGE UP (ref 10.3–14.5)
SODIUM SERPL-SCNC: 140 MMOL/L — SIGNIFICANT CHANGE UP (ref 135–145)
SPECIMEN SOURCE: SIGNIFICANT CHANGE UP
WBC # BLD: 5 K/UL — SIGNIFICANT CHANGE UP (ref 3.8–10.5)
WBC # FLD AUTO: 5 K/UL — SIGNIFICANT CHANGE UP (ref 3.8–10.5)

## 2019-01-30 PROCEDURE — 76937 US GUIDE VASCULAR ACCESS: CPT | Mod: 26,59

## 2019-01-30 PROCEDURE — 99233 SBSQ HOSP IP/OBS HIGH 50: CPT

## 2019-01-30 PROCEDURE — 36569 INSJ PICC 5 YR+ W/O IMAGING: CPT

## 2019-01-30 RX ORDER — SENNA PLUS 8.6 MG/1
10 TABLET ORAL AT BEDTIME
Qty: 0 | Refills: 0 | Status: DISCONTINUED | OUTPATIENT
Start: 2019-01-30 | End: 2019-01-31

## 2019-01-30 RX ORDER — CHLORHEXIDINE GLUCONATE 213 G/1000ML
1 SOLUTION TOPICAL
Qty: 0 | Refills: 0 | Status: DISCONTINUED | OUTPATIENT
Start: 2019-01-30 | End: 2019-01-31

## 2019-01-30 RX ORDER — QUETIAPINE FUMARATE 200 MG/1
25 TABLET, FILM COATED ORAL EVERY 12 HOURS
Qty: 0 | Refills: 0 | Status: DISCONTINUED | OUTPATIENT
Start: 2019-01-30 | End: 2019-01-31

## 2019-01-30 RX ADMIN — Medication 100 MILLIGRAM(S): at 18:01

## 2019-01-30 RX ADMIN — Medication 1 MILLIGRAM(S): at 11:16

## 2019-01-30 RX ADMIN — Medication 64.8 MILLIGRAM(S): at 23:29

## 2019-01-30 RX ADMIN — Medication 100 MILLIGRAM(S): at 11:16

## 2019-01-30 RX ADMIN — Medication 64.8 MILLIGRAM(S): at 06:39

## 2019-01-30 RX ADMIN — LINEZOLID 300 MILLIGRAM(S): 600 INJECTION, SOLUTION INTRAVENOUS at 06:39

## 2019-01-30 RX ADMIN — Medication 650 MILLIGRAM(S): at 08:50

## 2019-01-30 RX ADMIN — HALOPERIDOL DECANOATE 5 MILLIGRAM(S): 100 INJECTION INTRAMUSCULAR at 11:08

## 2019-01-30 RX ADMIN — ENOXAPARIN SODIUM 40 MILLIGRAM(S): 100 INJECTION SUBCUTANEOUS at 18:01

## 2019-01-30 RX ADMIN — CEFEPIME 100 MILLIGRAM(S): 1 INJECTION, POWDER, FOR SOLUTION INTRAMUSCULAR; INTRAVENOUS at 21:30

## 2019-01-30 RX ADMIN — CEFEPIME 100 MILLIGRAM(S): 1 INJECTION, POWDER, FOR SOLUTION INTRAMUSCULAR; INTRAVENOUS at 14:29

## 2019-01-30 RX ADMIN — QUETIAPINE FUMARATE 25 MILLIGRAM(S): 200 TABLET, FILM COATED ORAL at 18:05

## 2019-01-30 RX ADMIN — LINEZOLID 300 MILLIGRAM(S): 600 INJECTION, SOLUTION INTRAVENOUS at 18:02

## 2019-01-30 RX ADMIN — SCOPALAMINE 1 PATCH: 1 PATCH, EXTENDED RELEASE TRANSDERMAL at 19:04

## 2019-01-30 RX ADMIN — Medication 64.8 MILLIGRAM(S): at 11:08

## 2019-01-30 RX ADMIN — SENNA PLUS 10 MILLILITER(S): 8.6 TABLET ORAL at 23:13

## 2019-01-30 RX ADMIN — Medication 64.8 MILLIGRAM(S): at 00:49

## 2019-01-30 RX ADMIN — Medication 1 MILLIGRAM(S): at 21:30

## 2019-01-30 RX ADMIN — SCOPALAMINE 1 PATCH: 1 PATCH, EXTENDED RELEASE TRANSDERMAL at 08:48

## 2019-01-30 RX ADMIN — Medication 650 MILLIGRAM(S): at 08:05

## 2019-01-30 RX ADMIN — Medication 64.8 MILLIGRAM(S): at 18:01

## 2019-01-30 RX ADMIN — PANTOPRAZOLE SODIUM 40 MILLIGRAM(S): 20 TABLET, DELAYED RELEASE ORAL at 11:16

## 2019-01-30 RX ADMIN — CEFEPIME 100 MILLIGRAM(S): 1 INJECTION, POWDER, FOR SOLUTION INTRAMUSCULAR; INTRAVENOUS at 06:39

## 2019-01-30 RX ADMIN — QUETIAPINE FUMARATE 50 MILLIGRAM(S): 200 TABLET, FILM COATED ORAL at 06:39

## 2019-01-30 NOTE — SWALLOW BEDSIDE ASSESSMENT ADULT - H & P REVIEW
yes/Pt is a 52 y/o male w/PMH gerd, HLD, etoh abuse presents with complaint of tremors, abdominal pain, nausea, and body aches.  States drinks whiskey regularly cant quantify amount, about 4 days ago started to drink alot more than usual as was w/friends, and states started to get abdominal pains and unable to keep anything down over the last 2 days, last drink 2 days, ago, first reports was just throwing up food material then dark coffee ground like. no melena or brbpr.  pt s/p RRT 2/2 combativeagitation Patient transferred to ICU for closer monitoring. while in critical care pt Episodes of sedation followed by intermittent episodes of agitation / danger to staff

## 2019-01-30 NOTE — PROGRESS NOTE ADULT - SUBJECTIVE AND OBJECTIVE BOX
Patient is a 53y old  Male who presents with a chief complaint of n/v (29 Jan 2019 17:12)      INTERVAL HPI / OVERNIGHT EVENTS: was very agitated and has been given haldol so calmed down right now    MEDICATIONS  (STANDING):  cefepime   IVPB 2000 milliGRAM(s) IV Intermittent every 8 hours  chlorhexidine 4% Liquid 1 Application(s) Topical <User Schedule>  docusate sodium Liquid 100 milliGRAM(s) Oral two times a day  doxazosin 1 milliGRAM(s) Oral at bedtime  enoxaparin Injectable 40 milliGRAM(s) SubCutaneous every 24 hours  folic acid 1 milliGRAM(s) Oral daily  influenza   Vaccine 0.5 milliLiter(s) IntraMuscular once  linezolid  IVPB 600 milliGRAM(s) IV Intermittent every 12 hours  pantoprazole   Suspension 40 milliGRAM(s) Oral daily  PHENobarbital 64.8 milliGRAM(s) Oral every 6 hours  QUEtiapine 50 milliGRAM(s) Oral every 12 hours  scopolamine   Patch 1 Patch Transdermal every 72 hours  senna Syrup 10 milliLiter(s) Oral at bedtime  thiamine 100 milliGRAM(s) Oral daily    MEDICATIONS  (PRN):  acetaminophen   Tablet .. 650 milliGRAM(s) Oral every 6 hours PRN Temp greater or equal to 38C (100.4F)  haloperidol    Injectable 5 milliGRAM(s) IntraMuscular every 6 hours PRN Agitation  ondansetron Injectable 4 milliGRAM(s) IV Push every 6 hours PRN Nausea and/or Vomiting      Vital Signs Last 24 Hrs  T(C): 37.2 (30 Jan 2019 12:00), Max: 37.8 (29 Jan 2019 18:18)  T(F): 98.9 (30 Jan 2019 12:00), Max: 100.1 (29 Jan 2019 18:18)  HR: 81 (30 Jan 2019 12:00) (81 - 95)  BP: 121/67 (30 Jan 2019 12:00) (95/62 - 134/80)  BP(mean): 92 (29 Jan 2019 19:30) (70 - 92)  RR: 18 (30 Jan 2019 12:00) (14 - 18)  SpO2: 95% (30 Jan 2019 12:00) (94% - 98%)    Review of systems:  General : no fever /chills,fatigue  CVS : no chest pain, palpitations  Lungs : no shortness of breath,++ cough  GI : no ,vomiting, diarrhea   : no hematuria        PHYSICAL EXAM:  General :NAD  Constitutional:  well-groomed, well-developed  Respiratory: CTAB/L(per nurse ) increased secretions requiring frequent suctioning  Cardiovascular: S1 and S2, RRR, no M/G/R  Gastrointestinal: BS+, soft, NT/ND  Extremities: No peripheral edema  Vascular: 2+ peripheral pulses  Skin: No rashes      LABS:                        13.7   5.00  )-----------( 662      ( 30 Jan 2019 07:19 )             42.9     01-30    140  |  104  |  14  ----------------------------<  105<H>  4.0   |  25  |  0.80    Ca    9.0      30 Jan 2019 07:19  Phos  3.3     01-30  Mg     2.7     01-30    TPro  8.8<H>  /  Alb  3.0<L>  /  TBili  0.3  /  DBili  x   /  AST  36  /  ALT  29  /  AlkPhos  35<L>  01-30          MICROBIOLOGY:  RECENT CULTURES:  01-26 .Blood XXXX XXXX   No growth to date.    01-25 .Blood None aerobic XXXX XXXX   No growth to date.          RADIOLOGY & ADDITIONAL STUDIES:

## 2019-01-30 NOTE — SWALLOW BEDSIDE ASSESSMENT ADULT - PHARYNGEAL PHASE
Cough post oral intake/Throat clear post oral intake/Decreased laryngeal elevation/Delayed pharyngeal swallow/Wet vocal quality post oral intake Delayed pharyngeal swallow/Decreased laryngeal elevation/Multiple swallows/Cough post oral intake Delayed pharyngeal swallow/Wet vocal quality post oral intake/Decreased laryngeal elevation/Throat clear post oral intake/Multiple swallows/Cough post oral intake

## 2019-01-30 NOTE — SWALLOW BEDSIDE ASSESSMENT ADULT - SWALLOW EVAL: DIAGNOSIS
pt presented with reduced cognition/participation therefore limiting eval however noted oropharyngeal phases of swallow marked by baseline congestion/wet vocal quality, decreased mgmt of secretions, reduced labial seal, ? delay in swallow trigger with reduced laryngeal elevation, multiple swallows and clinical signs of aspiration with consistencies trialed. suggest pt would benefit from objective measure MBS with [place] sump removed.

## 2019-01-30 NOTE — PROGRESS NOTE ADULT - SUBJECTIVE AND OBJECTIVE BOX
Patient is a 53y old  Male who presents with a chief complaint of n/v (30 Jan 2019 15:08)      INTERVAL HPI/OVERNIGHT EVENTS: no overnight events     MEDICATIONS  (STANDING):  cefepime   IVPB 2000 milliGRAM(s) IV Intermittent every 8 hours  chlorhexidine 4% Liquid 1 Application(s) Topical <User Schedule>  docusate sodium Liquid 100 milliGRAM(s) Oral two times a day  doxazosin 1 milliGRAM(s) Oral at bedtime  enoxaparin Injectable 40 milliGRAM(s) SubCutaneous every 24 hours  folic acid 1 milliGRAM(s) Oral daily  influenza   Vaccine 0.5 milliLiter(s) IntraMuscular once  linezolid  IVPB 600 milliGRAM(s) IV Intermittent every 12 hours  pantoprazole   Suspension 40 milliGRAM(s) Oral daily  PHENobarbital 64.8 milliGRAM(s) Oral every 6 hours  QUEtiapine 50 milliGRAM(s) Oral every 12 hours  scopolamine   Patch 1 Patch Transdermal every 72 hours  senna Syrup 10 milliLiter(s) Oral at bedtime  thiamine 100 milliGRAM(s) Oral daily    MEDICATIONS  (PRN):  acetaminophen   Tablet .. 650 milliGRAM(s) Oral every 6 hours PRN Temp greater or equal to 38C (100.4F)  haloperidol    Injectable 5 milliGRAM(s) IntraMuscular every 6 hours PRN Agitation  ondansetron Injectable 4 milliGRAM(s) IV Push every 6 hours PRN Nausea and/or Vomiting      Allergies    No Known Allergies    Intolerances      Vital Signs Last 24 Hrs  T(C): 37.2 (30 Jan 2019 12:00), Max: 37.8 (29 Jan 2019 18:18)  T(F): 98.9 (30 Jan 2019 12:00), Max: 100.1 (29 Jan 2019 18:18)  HR: 81 (30 Jan 2019 12:00) (81 - 95)  BP: 121/67 (30 Jan 2019 12:00) (113/74 - 134/80)  BP(mean): 92 (29 Jan 2019 19:30) (92 - 92)  RR: 18 (30 Jan 2019 12:00) (16 - 18)  SpO2: 95% (30 Jan 2019 12:00) (94% - 98%)    PHYSICAL EXAM:  GENERAL: slightly confused   HEAD:  Atraumatic, Normocephalic  EYES: EOMI, PERRLA, conjunctiva and sclera clear  ENMT: No tonsillar erythema, exudates, or enlargement; Moist mucous membranes, Good dentition, No lesions  NECK: Supple, No JVD, Normal thyroid  NERVOUS SYSTEM:  Alert & Oriented X3, Good concentration; Motor Strength 5/5 B/L upper and lower extremities; DTRs 2+ intact and symmetric  CHEST/LUNG: Clear to percussion bilaterally; No rales, rhonchi, wheezing, or rubs  HEART: Regular rate and rhythm; No murmurs, rubs, or gallops  ABDOMEN: Soft, Nontender, Nondistended; Bowel sounds present  EXTREMITIES:  2+ Peripheral Pulses, No clubbing, cyanosis, or edema  LYMPH: No lymphadenopathy noted  SKIN: No rashes or lesions    LABS:                        13.7   5.00  )-----------( 662      ( 30 Jan 2019 07:19 )             42.9     01-30    140  |  104  |  14  ----------------------------<  105<H>  4.0   |  25  |  0.80    Ca    9.0      30 Jan 2019 07:19  Phos  3.3     01-30  Mg     2.7     01-30    TPro  8.8<H>  /  Alb  3.0<L>  /  TBili  0.3  /  DBili  x   /  AST  36  /  ALT  29  /  AlkPhos  35<L>  01-30        CAPILLARY BLOOD GLUCOSE          RADIOLOGY & ADDITIONAL TESTS:    Imaging Personally Reviewed:  [ ] YES  [ ] NO    Consultant(s) Notes Reviewed:  [ ] YES  [ ] NO    Care Discussed with Consultants/Other Providers [ ] YES  [ ] NO

## 2019-01-30 NOTE — SWALLOW BEDSIDE ASSESSMENT ADULT - SLP GENERAL OBSERVATIONS
pt seen bedside, alert restless and overall weakness with slow/listless movement. pt inconsistently responded to autobiographical/want questions with fair to poor accuracy- mostly becoming defensive/frustrated, he verbalized needs with confusion and was able to follow one step directions. kody price in place

## 2019-01-30 NOTE — PROGRESS NOTE ADULT - ASSESSMENT
52 y/o M w/ alcohol abuse, DT, GERD presenting with alcohol withdrawal with behavioral disturbance requiring high doses of sedatives and precedex infusion.   Course c/b MRSA and pseudomonas pna and urinary retention.  Pt being seen by ID for antibiotics.  Pt still with episodes of agitation.  Haldol ordered prn and phenobarbital titrated to PO (via NGT).  Will need to titrate phenobarb slowly.    Pt failed speech eval today and was even having difficulty tolerating  pureed diet.  Also having lots of secretions requiring frequent suctioning. Will keep NG and get MBS tomorrow.  afebrile for 24 hours and wbc improved

## 2019-01-30 NOTE — PROCEDURE NOTE - NSPROCDETAILS_GEN_ALL_CORE
location identified, draped/prepped, sterile technique used/sterile dressing applied/supine position/ultrasound guidance/ultrasound assessment/sterile technique, catheter placed

## 2019-01-31 ENCOUNTER — TRANSCRIPTION ENCOUNTER (OUTPATIENT)
Age: 54
End: 2019-01-31

## 2019-01-31 VITALS
HEART RATE: 85 BPM | DIASTOLIC BLOOD PRESSURE: 84 MMHG | SYSTOLIC BLOOD PRESSURE: 117 MMHG | TEMPERATURE: 99 F | OXYGEN SATURATION: 96 % | RESPIRATION RATE: 18 BRPM

## 2019-01-31 LAB
CULTURE RESULTS: SIGNIFICANT CHANGE UP
SPECIMEN SOURCE: SIGNIFICANT CHANGE UP

## 2019-01-31 PROCEDURE — 99233 SBSQ HOSP IP/OBS HIGH 50: CPT

## 2019-01-31 PROCEDURE — 99239 HOSP IP/OBS DSCHRG MGMT >30: CPT

## 2019-01-31 PROCEDURE — 70371 SPEECH EVALUATION COMPLEX: CPT | Mod: 26

## 2019-01-31 RX ORDER — LINEZOLID 600 MG/300ML
1 INJECTION, SOLUTION INTRAVENOUS
Qty: 14 | Refills: 0
Start: 2019-01-31 | End: 2019-02-06

## 2019-01-31 RX ORDER — FOLIC ACID 0.8 MG
1 TABLET ORAL
Qty: 0 | Refills: 0 | DISCHARGE
Start: 2019-01-31

## 2019-01-31 RX ORDER — THIAMINE MONONITRATE (VIT B1) 100 MG
1 TABLET ORAL
Qty: 0 | Refills: 0 | COMMUNITY
Start: 2019-01-31

## 2019-01-31 RX ORDER — PANTOPRAZOLE SODIUM 20 MG/1
1 TABLET, DELAYED RELEASE ORAL
Qty: 30 | Refills: 0 | OUTPATIENT
Start: 2019-01-31

## 2019-01-31 RX ORDER — DOXAZOSIN MESYLATE 4 MG
1 TABLET ORAL
Qty: 0 | Refills: 0 | DISCHARGE
Start: 2019-01-31

## 2019-01-31 RX ADMIN — LINEZOLID 300 MILLIGRAM(S): 600 INJECTION, SOLUTION INTRAVENOUS at 06:35

## 2019-01-31 RX ADMIN — CEFEPIME 100 MILLIGRAM(S): 1 INJECTION, POWDER, FOR SOLUTION INTRAMUSCULAR; INTRAVENOUS at 06:35

## 2019-01-31 RX ADMIN — PANTOPRAZOLE SODIUM 40 MILLIGRAM(S): 20 TABLET, DELAYED RELEASE ORAL at 13:07

## 2019-01-31 RX ADMIN — Medication 64.8 MILLIGRAM(S): at 13:04

## 2019-01-31 RX ADMIN — INFLUENZA VIRUS VACCINE 0.5 MILLILITER(S): 15; 15; 15; 15 SUSPENSION INTRAMUSCULAR at 15:53

## 2019-01-31 RX ADMIN — SCOPALAMINE 1 PATCH: 1 PATCH, EXTENDED RELEASE TRANSDERMAL at 07:35

## 2019-01-31 RX ADMIN — Medication 64.8 MILLIGRAM(S): at 06:35

## 2019-01-31 RX ADMIN — CHLORHEXIDINE GLUCONATE 1 APPLICATION(S): 213 SOLUTION TOPICAL at 10:05

## 2019-01-31 RX ADMIN — HALOPERIDOL DECANOATE 5 MILLIGRAM(S): 100 INJECTION INTRAMUSCULAR at 02:11

## 2019-01-31 RX ADMIN — Medication 100 MILLIGRAM(S): at 06:36

## 2019-01-31 RX ADMIN — Medication 1 MILLIGRAM(S): at 13:04

## 2019-01-31 RX ADMIN — Medication 100 MILLIGRAM(S): at 13:07

## 2019-01-31 RX ADMIN — CEFEPIME 100 MILLIGRAM(S): 1 INJECTION, POWDER, FOR SOLUTION INTRAMUSCULAR; INTRAVENOUS at 13:43

## 2019-01-31 RX ADMIN — QUETIAPINE FUMARATE 25 MILLIGRAM(S): 200 TABLET, FILM COATED ORAL at 06:35

## 2019-01-31 NOTE — PROGRESS NOTE ADULT - PROBLEM SELECTOR PLAN 2
zyvox
sputum c/s grew pseudomonas  change to levaquin for 1 week
sputum c/s grew pseudomonas  cont cefepime for now
sputum c/s grerw pseudomoans  cont cefepime for now

## 2019-01-31 NOTE — DISCHARGE NOTE ADULT - MEDICATION SUMMARY - MEDICATIONS TO TAKE
I will START or STAY ON the medications listed below when I get home from the hospital:    doxazosin 1 mg oral tablet  -- 1 tab(s) by mouth once a day (at bedtime)  -- Indication: For bph    linezolid 600 mg oral tablet  -- 1 tab(s) by mouth every 12 hours   -- Avoid chocolate, wine and cheese while taking this medication.  Finish all this medication unless otherwise directed by prescriber.  Obtain medical advice before taking any non-prescription drugs as some may affect the action of this medication.    -- Indication: For Pneumonia due to methicillin resistant Staphylococcus aureus, unspecified laterality, unspecified part of lung    pantoprazole 40 mg oral delayed release tablet  -- 1 tab(s) by mouth once a day (before a meal)  -- Indication: For Gerd    Levaquin 750 mg oral tablet  -- 1 tab(s) by mouth every 24 hours   -- Avoid prolonged or excessive exposure to direct and/or artificial sunlight while taking this medication.  Do not take dairy products, antacids, or iron preparations within one hour of this medication.  Finish all this medication unless otherwise directed by prescriber.  May cause drowsiness or dizziness.  Medication should be taken with plenty of water.    -- Indication: For Pneumonia due to methicillin resistant Staphylococcus aureus, unspecified laterality, unspecified part of lung    folic acid 1 mg oral tablet  -- 1 tab(s) by mouth once a day  -- Indication: For supplement     thiamine 100 mg oral tablet  -- 1 tab(s) by mouth once a day  -- Indication: For supplement

## 2019-01-31 NOTE — DISCHARGE NOTE ADULT - CARE PLAN
Principal Discharge DX:	Alcohol withdrawal syndrome, with delirium  Goal:	resolved  Assessment and plan of treatment:	abstinence from alcohol encouraged  Secondary Diagnosis:	Gastritis  Goal:	stable  Assessment and plan of treatment:	Continue home medication  Secondary Diagnosis:	Gram-negative pneumonia  Goal:	improved  Assessment and plan of treatment:	finish antibx as directed. Please follow up with Dr. Lawrence in 2weeks  Secondary Diagnosis:	Pneumonia due to methicillin resistant Staphylococcus aureus, unspecified laterality, unspecified part of lung  Goal:	improved  Assessment and plan of treatment:	finish antibx as directed. Please follow up with Dr. Lawrence in 2weeks  Secondary Diagnosis:	Difficulty swallowing  Goal:	improved  Assessment and plan of treatment:	barrium swallow negative. pt now more awake and will attempt to feed regular diet

## 2019-01-31 NOTE — PROGRESS NOTE ADULT - PROBLEM SELECTOR PROBLEM 1
Gram-negative pneumonia
Pneumonia due to methicillin resistant Staphylococcus aureus, unspecified laterality, unspecified part of lung

## 2019-01-31 NOTE — PROGRESS NOTE ADULT - PROBLEM SELECTOR PROBLEM 3
Alcohol withdrawal
Fever, unspecified fever cause

## 2019-01-31 NOTE — DISCHARGE NOTE ADULT - MEDICATION SUMMARY - MEDICATIONS TO STOP TAKING
I will STOP taking the medications listed below when I get home from the hospital:    Pepcid 20 mg oral tablet  -- 1 tab(s) by mouth 2 times a day   -- It is very important that you take or use this exactly as directed.  Do not skip doses or discontinue unless directed by your doctor.  Obtain medical advice before taking any non-prescription drugs as some may affect the action of this medication.

## 2019-01-31 NOTE — SWALLOW VFSS/MBS ASSESSMENT ADULT - DIAGNOSTIC IMPRESSIONS
pt presented with adequate oral phase, timely laryngeal swallow trigger with slightly reduced laryngeal closure/pharyngeal squeeze/ar pt presented with adequate oral phase and pharyngeal phase reshma bytimely laryngeal swallow trigger with reduced laryngeal closure pharyngeal squeeze/arytenoid tilt causing laryngeal penetration (shallow) with no laryngeal vestibule residue and decreased BOT retraction causing trace residue in the valleculae and along the BOT with thin. NO aspiration noted during study.

## 2019-01-31 NOTE — CHART NOTE - NSCHARTNOTEFT_GEN_A_CORE
Assessment: Pt seen for follow-up & continues acute severe malnutrition. Pt with pneumonia, ETOH withdrawal & gastritis.     Factors impacting intake: [ ] none [ ] nausea  [ ] vomiting [ ] diarrhea [ ] constipation  [ ]chewing problems [ ] swallowing issues  [x] other: increased secretions    Diet Prescription: Diet, NPO with Tube Feed:   Tube Feeding Modality: Nasogastric  Vital AF  Total Volume for 24 Hours (mL): 1440  Continuous  Starting Tube Feed Rate {mL per Hour}: 30  Increase Tube Feed Rate by (mL): 10     Every 8 hours  Until Goal Tube Feed Rate (mL per Hour): 60  Tube Feed Duration (in Hours): 24  Tube Feed Start Time: 14:49 (01-23-19 @ 14:50)    Intake: NGT d/c'd 1/30. Pt received MBS today; recommends Regular/thin liquids. Last BM x 1(1/20); pt on senna & colace; NP notified    Current Weight: Wt=73.4kg(1/27), Wt=76.5kg(1/20)  % Weight Change 3.1kg(4%) x 1 week due to s/p NPO x 1 week hospitalization     Physical appearance:  +2 edema Valentin wrist; Nutrition focused physical exam conducted; Subcutaneous fat loss: [ ] Orbital fat pads region, [ ]Buccal fat region, [ ]Triceps region,  [ ]Ribs region.  Muscle wasting: [ ]Temples region, [ ]Clavicle region, [ ]Shoulder region, [ ]Scapula region, [ ]Interosseous region,  [ ]thigh region, [ ]Calf region    Pertinent Medications: MEDICATIONS  (STANDING):  cefepime   IVPB 2000 milliGRAM(s) IV Intermittent every 8 hours  chlorhexidine 4% Liquid 1 Application(s) Topical <User Schedule>  docusate sodium Liquid 100 milliGRAM(s) Oral two times a day  doxazosin 1 milliGRAM(s) Oral at bedtime  enoxaparin Injectable 40 milliGRAM(s) SubCutaneous every 24 hours  folic acid 1 milliGRAM(s) Oral daily  influenza   Vaccine 0.5 milliLiter(s) IntraMuscular once  linezolid  IVPB 600 milliGRAM(s) IV Intermittent every 12 hours  pantoprazole   Suspension 40 milliGRAM(s) Oral daily  PHENobarbital 64.8 milliGRAM(s) Oral every 6 hours  QUEtiapine 25 milliGRAM(s) Oral every 12 hours  scopolamine   Patch 1 Patch Transdermal every 72 hours  senna Syrup 10 milliLiter(s) Oral at bedtime  thiamine 100 milliGRAM(s) Oral daily    MEDICATIONS  (PRN):  acetaminophen   Tablet .. 650 milliGRAM(s) Oral every 6 hours PRN Temp greater or equal to 38C (100.4F)  haloperidol    Injectable 5 milliGRAM(s) IntraMuscular every 6 hours PRN Agitation  ondansetron Injectable 4 milliGRAM(s) IV Push every 6 hours PRN Nausea and/or Vomiting    Pertinent Labs: 01-30 Na 140 mmol/L Glu 105 mg/dL<H> K+ 4.0 mmol/L Cr 0.80 mg/dL BUN 14 mg/dL Phos 3.3 mg/dL Alb 3.0 g/dL<L> PAB n/a   Hgb 13.7 g/dL Hct 42.9 % HgA1C n/a     24Hr FS:  Skin:  intact    Estimated Needs:   [x ] no change since previous assessment (1/21/19)  [ ] recalculated:     Previous Nutrition Diagnosis:   [ ] Inadequate Energy Intake [ ]Inadequate Oral Intake [ ] Excessive Energy Intake   [ ] Underweight [ ] Increased Nutrient Needs [ ] Overweight/Obesity   [ ] Altered GI Function [ ] Unintended Weight Loss [ ] Food & Nutrition Related Knowledge Deficit [x ] Acute severe Malnutrition [ ] moderate malnutrition    Nutrition Diagnosis is [x ] ongoing  [ ] resolved  [ ] improved  [ ] not applicable   Previous Goal: Pt to meet >75% energy & protein via TF; not applicable    New Nutrition Diagnosis: [ ] not applicable       Interventions:   Recommend  [ ] Continue:  [ x] Change Diet To: Regular/Thin liquids  [x ] Nutrition Supplement: Ensure Enlive 2 x day(350kcal & 20gm protein)  [ ] Nutrition Support:  [ x] Other: Consider laxative due to no BM x 11 days    Monitoring and Evaluation:   [x ] PO intake [ x ] Tolerance to diet prescription [ x ] weights [ x ] labs[ x ] follow up per protocol  [ ] other: Assessment: Pt seen for follow-up & continues acute severe malnutrition. Pt with pneumonia, ETOH withdrawal & gastritis. Pt awake & alert today & requesting food.     Factors impacting intake: [ ] none [ ] nausea  [ ] vomiting [ ] diarrhea [ ] constipation  [ ]chewing problems [ ] swallowing issues  [x] other: increased secretions, impaired cognitive status, congestion    Diet Prescription: Diet, NPO with Tube Feed:   Tube Feeding Modality: Nasogastric  Vital AF  Total Volume for 24 Hours (mL): 1440  Continuous  Starting Tube Feed Rate {mL per Hour}: 30  Increase Tube Feed Rate by (mL): 10     Every 8 hours  Until Goal Tube Feed Rate (mL per Hour): 60  Tube Feed Duration (in Hours): 24  Tube Feed Start Time: 14:49 (01-23-19 @ 14:50)    Intake: NGT d/c'd 1/30. Pt received MBS today; no aspiration; recommends Regular/thin liquids. Last BM x 1(1/20); pt on senna & colace; NP notified    Current Weight: Wt=73.4kg(1/27), Wt=76.5kg(1/20)  % Weight Change 3.1kg(4%) x 1 week due to s/p NPO x 1 week hospitalization     Physical appearance:  +2 edema Valnetin wrist; Nutrition focused physical exam conducted; Subcutaneous fat loss: [WNL ] Orbital fat pads region, [WNL ]Buccal fat region, [WNL ]Triceps region,  [WNL ]Ribs region.  Muscle wasting: [WNL ]Temples region, [WNL ]Clavicle region, [WNL ]Shoulder region, [unable ]Scapula region, [WNL ]Interosseous region,  [Moderate ]thigh region, [Mild ]Calf region    Pertinent Medications: MEDICATIONS  (STANDING):  cefepime   IVPB 2000 milliGRAM(s) IV Intermittent every 8 hours  chlorhexidine 4% Liquid 1 Application(s) Topical <User Schedule>  docusate sodium Liquid 100 milliGRAM(s) Oral two times a day  doxazosin 1 milliGRAM(s) Oral at bedtime  enoxaparin Injectable 40 milliGRAM(s) SubCutaneous every 24 hours  folic acid 1 milliGRAM(s) Oral daily  influenza   Vaccine 0.5 milliLiter(s) IntraMuscular once  linezolid  IVPB 600 milliGRAM(s) IV Intermittent every 12 hours  pantoprazole   Suspension 40 milliGRAM(s) Oral daily  PHENobarbital 64.8 milliGRAM(s) Oral every 6 hours  QUEtiapine 25 milliGRAM(s) Oral every 12 hours  scopolamine   Patch 1 Patch Transdermal every 72 hours  senna Syrup 10 milliLiter(s) Oral at bedtime  thiamine 100 milliGRAM(s) Oral daily    MEDICATIONS  (PRN):  acetaminophen   Tablet .. 650 milliGRAM(s) Oral every 6 hours PRN Temp greater or equal to 38C (100.4F)  haloperidol    Injectable 5 milliGRAM(s) IntraMuscular every 6 hours PRN Agitation  ondansetron Injectable 4 milliGRAM(s) IV Push every 6 hours PRN Nausea and/or Vomiting    Pertinent Labs: 01-30 Na 140 mmol/L Glu 105 mg/dL<H> K+ 4.0 mmol/L Cr 0.80 mg/dL BUN 14 mg/dL Phos 3.3 mg/dL Alb 3.0 g/dL<L> PAB n/a   Hgb 13.7 g/dL Hct 42.9 % HgA1C n/a     24Hr FS:  Skin:  intact    Estimated Needs:   [x ] no change since previous assessment (1/21/19)  [ ] recalculated:     Previous Nutrition Diagnosis:   [ ] Inadequate Energy Intake [ ]Inadequate Oral Intake [ ] Excessive Energy Intake   [ ] Underweight [ ] Increased Nutrient Needs [ ] Overweight/Obesity   [ ] Altered GI Function [ ] Unintended Weight Loss [ ] Food & Nutrition Related Knowledge Deficit [x ] Acute severe Malnutrition [ ] moderate malnutrition    Nutrition Diagnosis is [x ] ongoing  [ ] resolved  [ ] improved  [ ] not applicable   Previous Goal: Pt to meet >75% energy & protein via TF; not applicable    New Nutrition Diagnosis: [ ] not applicable       Interventions:   Recommend  [ ] Continue:  [ x] Change Diet To: Regular/Thin liquids  [x ] Nutrition Supplement: Ensure Enlive 2 x day(350kcal & 20gm protein)  [ ] Nutrition Support:  [ x] Other: Consider laxative due to no BM x 11 days    Monitoring and Evaluation:   [x ] PO intake [ x ] Tolerance to diet prescription [ x ] weights [ x ] labs[ x ] follow up per protocol  [ ] other:

## 2019-01-31 NOTE — DISCHARGE NOTE ADULT - HOSPITAL COURSE
54 y/o M w/ alcohol abuse, DT, GERD presenting with alcohol withdrawal with behavioral disturbance requiring high doses of sedatives and precedex infusion.   Course c/b MRSA and pseudomonas pna and urinary retention.  Pt being seen by ID for antibiotics. pt was having episodes of agitation, haldol prn and phenobarbital titrated to PO (via NGT).  Pt failed speech eval and was even having difficulty tolerating  pureed diet.  Also was having lots of secretions requiring frequent suctioning.     s/p MBS results shows: pt presented with adequate oral phase, timely laryngeal swallow trigger with slightly reduced laryngeal closure/pharyngeal squeeze  	   afebrile for 24 hours and wbc improved     Problem/Plan - 1:  ·  Problem: Gram-negative pneumonia.  finish antibx     Problem/Plan - 2:  ·  Problem: Pneumonia due to methicillin resistant Staphylococcus aureus, unspecified laterality, unspecified part of lung.  Plan: zyvox.and levaquin po and follow up with DR. Lawrence within 2 weeks     Problem/Plan - 3:  ·  Problem: Alcohol withdrawal.  Alcohol Abstinence encouraged.     Problem/Plan - 4:  ·  Problem: Gastritis.  Plan: ppi.     Problem/Plan - 5:  ·  Problem: Metabolic encephalopathy.  Plan: Improved, pt now back to baseline. wanted to go home and attend his daughters wedding. called family and will come to pick him up by 3 pm.     Problem/Plan - 6:  Problem: Difficulty swallowing. improved.     STABLE FOR DC

## 2019-01-31 NOTE — DISCHARGE NOTE ADULT - PLAN OF CARE
finish antibx as directed. Please follow up with Dr. Lawrence in 2weeks improved barrium swallow negative. pt now more awake and will attempt to feed regular diet resolved abstinence from alcohol encouraged stable Continue home medication

## 2019-01-31 NOTE — DISCHARGE NOTE ADULT - PATIENT PORTAL LINK FT
You can access the [employer] [facility], offered by [facility], by registering with the following website: [email or web address]

## 2019-01-31 NOTE — PROGRESS NOTE ADULT - PROBLEM SELECTOR PROBLEM 2
Pneumonia due to methicillin resistant Staphylococcus aureus, unspecified laterality, unspecified part of lung
Gram-negative pneumonia

## 2019-01-31 NOTE — PROGRESS NOTE ADULT - PROBLEM SELECTOR PROBLEM 4
Gastritis
Acute alcoholic gastritis with hemorrhage

## 2019-01-31 NOTE — SWALLOW VFSS/MBS ASSESSMENT ADULT - H & P REVIEW
yes/54 y/o M w/ alcohol abuse, DT, GERD presenting with alcohol withdrawal with behavioral disturbance requiring high doses of sedatives and precedex infusion.   Course c/b MRSA and pseudomonas pna and urinary retention.  Pt being seen by ID for antibiotics.  Pt still with episodes of agitation.  Haldol ordered prn and phenobarbital titrated to PO (via NGT).  Will need to titrate phenobarb slowly.  1/30/2019

## 2019-01-31 NOTE — PROGRESS NOTE ADULT - PROBLEM SELECTOR PLAN 1
c/w cefepime
cont  zyvox  for now   platelets stable
cont  zyvox oral for 1 week   if not covered with insurance ok to switch to doxy
change vanco to zyvox as still spiking fever with vanco
shavings/Culture

## 2019-01-31 NOTE — PROGRESS NOTE ADULT - PROVIDER SPECIALTY LIST ADULT
Critical Care
Infectious Disease
Critical Care
Hospitalist

## 2019-01-31 NOTE — DISCHARGE NOTE ADULT - SECONDARY DIAGNOSIS.
Pneumonia due to methicillin resistant Staphylococcus aureus, unspecified laterality, unspecified part of lung Difficulty swallowing Gastritis Gram-negative pneumonia

## 2019-01-31 NOTE — PROGRESS NOTE ADULT - ATTENDING COMMENTS
change vanco to zyvox as still spiking fever  cont cefepime
change vanco to zyvox as still spiking fever  cont cefepime
pt to call me if not improved at the end of 1 week may require longer course of antibiotics

## 2019-01-31 NOTE — PROGRESS NOTE ADULT - SUBJECTIVE AND OBJECTIVE BOX
Patient is a 53y old  Male who presents with a chief complaint of n/v (31 Jan 2019 13:07)      INTERVAL HPI / OVERNIGHT EVENTS: doing better, more awake ,alert    MEDICATIONS  (STANDING):  cefepime   IVPB 2000 milliGRAM(s) IV Intermittent every 8 hours  chlorhexidine 4% Liquid 1 Application(s) Topical <User Schedule>  docusate sodium Liquid 100 milliGRAM(s) Oral two times a day  doxazosin 1 milliGRAM(s) Oral at bedtime  enoxaparin Injectable 40 milliGRAM(s) SubCutaneous every 24 hours  folic acid 1 milliGRAM(s) Oral daily  influenza   Vaccine 0.5 milliLiter(s) IntraMuscular once  linezolid  IVPB 600 milliGRAM(s) IV Intermittent every 12 hours  pantoprazole   Suspension 40 milliGRAM(s) Oral daily  PHENobarbital 64.8 milliGRAM(s) Oral every 6 hours  QUEtiapine 25 milliGRAM(s) Oral every 12 hours  scopolamine   Patch 1 Patch Transdermal every 72 hours  senna Syrup 10 milliLiter(s) Oral at bedtime  thiamine 100 milliGRAM(s) Oral daily    MEDICATIONS  (PRN):  acetaminophen   Tablet .. 650 milliGRAM(s) Oral every 6 hours PRN Temp greater or equal to 38C (100.4F)  haloperidol    Injectable 5 milliGRAM(s) IntraMuscular every 6 hours PRN Agitation  ondansetron Injectable 4 milliGRAM(s) IV Push every 6 hours PRN Nausea and/or Vomiting      Vital Signs Last 24 Hrs  T(C): 36.5 (31 Jan 2019 06:05), Max: 37.7 (30 Jan 2019 17:00)  T(F): 97.7 (31 Jan 2019 06:05), Max: 99.9 (30 Jan 2019 17:00)  HR: 84 (31 Jan 2019 06:05) (72 - 84)  BP: 131/82 (31 Jan 2019 06:05) (105/60 - 131/82)  BP(mean): --  RR: 21 (31 Jan 2019 06:05) (20 - 21)  SpO2: 96% (31 Jan 2019 06:05) (96% - 98%)    Review of systems:  General : no fever /chills, +fatigue  CVS : no chest pain, palpitations  Lungs : no shortness of breath,++ cough  GI : no abdominal pain, vomiting, diarrhea   : no dysuria, hematuria        PHYSICAL EXAM:  General :NAD  Constitutional:  well-groomed, well-developed  Respiratory: CTAB/L,few crackles  Cardiovascular: S1 and S2, RRR, no M/G/R  Gastrointestinal: BS+, soft, NT/ND  Extremities: No peripheral edema  Vascular: 2+ peripheral pulses  Skin: No rashes      LABS:                        13.7   5.00  )-----------( 662      ( 30 Jan 2019 07:19 )             42.9     01-30    140  |  104  |  14  ----------------------------<  105<H>  4.0   |  25  |  0.80    Ca    9.0      30 Jan 2019 07:19  Phos  3.3     01-30  Mg     2.7     01-30    TPro  8.8<H>  /  Alb  3.0<L>  /  TBili  0.3  /  DBili  x   /  AST  36  /  ALT  29  /  AlkPhos  35<L>  01-30          MICROBIOLOGY:  RECENT CULTURES:  01-26 .Blood XXXX XXXX   No growth at 5 days.    01-25 .Blood None aerobic XXXX XXXX   No growth at 5 days.          RADIOLOGY & ADDITIONAL STUDIES:

## 2019-01-31 NOTE — DISCHARGE NOTE ADULT - CARE PROVIDER_API CALL
Emily Lawrence)  Infectious Disease; Internal Medicine  [address] [place] [address]  Phone: [phone]  Fax: [phone]

## 2019-01-31 NOTE — SWALLOW VFSS/MBS ASSESSMENT ADULT - SLP GENERAL OBSERVATIONS
pt seen sitting in the chair alert and oriented x2-3. pt with improved participation/cooperation and less restlessness/frustration

## 2019-02-04 DIAGNOSIS — K27.7 CHRONIC PEPTIC ULCER, SITE UNSPECIFIED, WITHOUT HEMORRHAGE OR PERFORATION: ICD-10-CM

## 2019-02-04 DIAGNOSIS — K29.70 GASTRITIS, UNSPECIFIED, WITHOUT BLEEDING: ICD-10-CM

## 2019-02-04 DIAGNOSIS — F10.231 ALCOHOL DEPENDENCE WITH WITHDRAWAL DELIRIUM: ICD-10-CM

## 2019-02-04 DIAGNOSIS — R13.10 DYSPHAGIA, UNSPECIFIED: ICD-10-CM

## 2019-02-04 DIAGNOSIS — K21.9 GASTRO-ESOPHAGEAL REFLUX DISEASE WITHOUT ESOPHAGITIS: ICD-10-CM

## 2019-02-04 DIAGNOSIS — K27.9 PEPTIC ULCER, SITE UNSPECIFIED, UNSPECIFIED AS ACUTE OR CHRONIC, WITHOUT HEMORRHAGE OR PERFORATION: ICD-10-CM

## 2019-02-04 DIAGNOSIS — E78.2 MIXED HYPERLIPIDEMIA: ICD-10-CM

## 2019-02-04 DIAGNOSIS — J15.6 PNEUMONIA DUE TO OTHER GRAM-NEGATIVE BACTERIA: ICD-10-CM

## 2019-02-04 DIAGNOSIS — G93.41 METABOLIC ENCEPHALOPATHY: ICD-10-CM

## 2019-02-04 DIAGNOSIS — E43 UNSPECIFIED SEVERE PROTEIN-CALORIE MALNUTRITION: ICD-10-CM

## 2019-02-04 DIAGNOSIS — J15.212 PNEUMONIA DUE TO METHICILLIN RESISTANT STAPHYLOCOCCUS AUREUS: ICD-10-CM

## 2019-03-31 ENCOUNTER — INPATIENT (INPATIENT)
Facility: HOSPITAL | Age: 54
LOS: 12 days | Discharge: HOME | End: 2019-04-13
Attending: INTERNAL MEDICINE | Admitting: INTERNAL MEDICINE
Payer: MEDICAID

## 2019-03-31 VITALS
OXYGEN SATURATION: 98 % | RESPIRATION RATE: 24 BRPM | HEART RATE: 140 BPM | TEMPERATURE: 99 F | DIASTOLIC BLOOD PRESSURE: 112 MMHG | WEIGHT: 166.89 LBS | HEIGHT: 67 IN | SYSTOLIC BLOOD PRESSURE: 153 MMHG

## 2019-03-31 DIAGNOSIS — K27.9 PEPTIC ULCER, SITE UNSPECIFIED, UNSPECIFIED AS ACUTE OR CHRONIC, WITHOUT HEMORRHAGE OR PERFORATION: ICD-10-CM

## 2019-03-31 DIAGNOSIS — F10.230 ALCOHOL DEPENDENCE WITH WITHDRAWAL, UNCOMPLICATED: ICD-10-CM

## 2019-03-31 LAB
ALBUMIN SERPL ELPH-MCNC: 4.7 G/DL — SIGNIFICANT CHANGE UP (ref 3.3–5)
ALP SERPL-CCNC: 65 U/L — SIGNIFICANT CHANGE UP (ref 40–120)
ALT FLD-CCNC: 35 U/L — SIGNIFICANT CHANGE UP (ref 12–78)
ANION GAP SERPL CALC-SCNC: 23 MMOL/L — HIGH (ref 5–17)
AST SERPL-CCNC: 41 U/L — HIGH (ref 15–37)
BILIRUB SERPL-MCNC: 1.5 MG/DL — HIGH (ref 0.2–1.2)
BLD GP AB SCN SERPL QL: SIGNIFICANT CHANGE UP
BUN SERPL-MCNC: 13 MG/DL — SIGNIFICANT CHANGE UP (ref 7–23)
CALCIUM SERPL-MCNC: 10 MG/DL — SIGNIFICANT CHANGE UP (ref 8.5–10.1)
CHLORIDE SERPL-SCNC: 89 MMOL/L — LOW (ref 96–108)
CK MB CFR SERPL CALC: <1 NG/ML — SIGNIFICANT CHANGE UP (ref 0.5–3.6)
CO2 SERPL-SCNC: 25 MMOL/L — SIGNIFICANT CHANGE UP (ref 22–31)
CREAT SERPL-MCNC: 1.49 MG/DL — HIGH (ref 0.5–1.3)
ETHANOL SERPL-MCNC: 130 MG/DL — HIGH (ref 0–10)
GLUCOSE SERPL-MCNC: 149 MG/DL — HIGH (ref 70–99)
HCT VFR BLD CALC: 47.6 % — SIGNIFICANT CHANGE UP (ref 39–50)
HGB BLD-MCNC: 16.2 G/DL — SIGNIFICANT CHANGE UP (ref 13–17)
LACTATE SERPL-SCNC: 10.9 MMOL/L — CRITICAL HIGH (ref 0.7–2)
LACTATE SERPL-SCNC: 4.8 MMOL/L — CRITICAL HIGH (ref 0.7–2)
LIDOCAIN IGE QN: 149 U/L — SIGNIFICANT CHANGE UP (ref 73–393)
MCHC RBC-ENTMCNC: 28 PG — SIGNIFICANT CHANGE UP (ref 27–34)
MCHC RBC-ENTMCNC: 34 GM/DL — SIGNIFICANT CHANGE UP (ref 32–36)
MCV RBC AUTO: 82.2 FL — SIGNIFICANT CHANGE UP (ref 80–100)
NRBC # BLD: 0 /100 WBCS — SIGNIFICANT CHANGE UP (ref 0–0)
PLATELET # BLD AUTO: 256 K/UL — SIGNIFICANT CHANGE UP (ref 150–400)
POTASSIUM SERPL-MCNC: 3.3 MMOL/L — LOW (ref 3.5–5.3)
POTASSIUM SERPL-SCNC: 3.3 MMOL/L — LOW (ref 3.5–5.3)
PROT SERPL-MCNC: 9.6 GM/DL — HIGH (ref 6–8.3)
RBC # BLD: 5.79 M/UL — SIGNIFICANT CHANGE UP (ref 4.2–5.8)
RBC # FLD: 12.7 % — SIGNIFICANT CHANGE UP (ref 10.3–14.5)
SODIUM SERPL-SCNC: 137 MMOL/L — SIGNIFICANT CHANGE UP (ref 135–145)
TROPONIN I SERPL-MCNC: <.015 NG/ML — SIGNIFICANT CHANGE UP (ref 0.01–0.04)
WBC # BLD: 10.74 K/UL — HIGH (ref 3.8–10.5)
WBC # FLD AUTO: 10.74 K/UL — HIGH (ref 3.8–10.5)

## 2019-03-31 PROCEDURE — 74174 CTA ABD&PLVS W/CONTRAST: CPT | Mod: 26

## 2019-03-31 PROCEDURE — 93010 ELECTROCARDIOGRAM REPORT: CPT

## 2019-03-31 PROCEDURE — 99285 EMERGENCY DEPT VISIT HI MDM: CPT

## 2019-03-31 PROCEDURE — 71275 CT ANGIOGRAPHY CHEST: CPT | Mod: 26

## 2019-03-31 PROCEDURE — 71045 X-RAY EXAM CHEST 1 VIEW: CPT | Mod: 26

## 2019-03-31 RX ORDER — FAMOTIDINE 10 MG/ML
20 INJECTION INTRAVENOUS ONCE
Qty: 0 | Refills: 0 | Status: DISCONTINUED | OUTPATIENT
Start: 2019-03-31 | End: 2019-03-31

## 2019-03-31 RX ORDER — FOLIC ACID 0.8 MG
1 TABLET ORAL DAILY
Qty: 0 | Refills: 0 | Status: DISCONTINUED | OUTPATIENT
Start: 2019-03-31 | End: 2019-04-13

## 2019-03-31 RX ORDER — SODIUM CHLORIDE 9 MG/ML
2000 INJECTION INTRAMUSCULAR; INTRAVENOUS; SUBCUTANEOUS ONCE
Qty: 0 | Refills: 0 | Status: COMPLETED | OUTPATIENT
Start: 2019-03-31 | End: 2019-03-31

## 2019-03-31 RX ORDER — POTASSIUM CHLORIDE 20 MEQ
40 PACKET (EA) ORAL DAILY
Qty: 0 | Refills: 0 | Status: DISCONTINUED | OUTPATIENT
Start: 2019-03-31 | End: 2019-04-05

## 2019-03-31 RX ORDER — CEFTRIAXONE 500 MG/1
1 INJECTION, POWDER, FOR SOLUTION INTRAMUSCULAR; INTRAVENOUS ONCE
Qty: 0 | Refills: 0 | Status: COMPLETED | OUTPATIENT
Start: 2019-03-31 | End: 2019-03-31

## 2019-03-31 RX ORDER — MAGNESIUM SULFATE 500 MG/ML
2 VIAL (ML) INJECTION ONCE
Qty: 0 | Refills: 0 | Status: COMPLETED | OUTPATIENT
Start: 2019-03-31 | End: 2019-03-31

## 2019-03-31 RX ORDER — ENOXAPARIN SODIUM 100 MG/ML
40 INJECTION SUBCUTANEOUS EVERY 24 HOURS
Qty: 0 | Refills: 0 | Status: DISCONTINUED | OUTPATIENT
Start: 2019-03-31 | End: 2019-04-13

## 2019-03-31 RX ORDER — SODIUM CHLORIDE 9 MG/ML
1000 INJECTION, SOLUTION INTRAVENOUS
Qty: 0 | Refills: 0 | Status: DISCONTINUED | OUTPATIENT
Start: 2019-03-31 | End: 2019-04-13

## 2019-03-31 RX ORDER — ONDANSETRON 8 MG/1
4 TABLET, FILM COATED ORAL EVERY 6 HOURS
Qty: 0 | Refills: 0 | Status: DISCONTINUED | OUTPATIENT
Start: 2019-03-31 | End: 2019-04-13

## 2019-03-31 RX ORDER — PANTOPRAZOLE SODIUM 20 MG/1
8 TABLET, DELAYED RELEASE ORAL
Qty: 80 | Refills: 0 | Status: DISCONTINUED | OUTPATIENT
Start: 2019-03-31 | End: 2019-04-04

## 2019-03-31 RX ORDER — THIAMINE MONONITRATE (VIT B1) 100 MG
100 TABLET ORAL DAILY
Qty: 0 | Refills: 0 | Status: COMPLETED | OUTPATIENT
Start: 2019-03-31 | End: 2019-04-03

## 2019-03-31 RX ORDER — METOCLOPRAMIDE HCL 10 MG
10 TABLET ORAL ONCE
Qty: 0 | Refills: 0 | Status: COMPLETED | OUTPATIENT
Start: 2019-03-31 | End: 2019-03-31

## 2019-03-31 RX ORDER — POTASSIUM CHLORIDE 20 MEQ
40 PACKET (EA) ORAL ONCE
Qty: 0 | Refills: 0 | Status: COMPLETED | OUTPATIENT
Start: 2019-03-31 | End: 2019-03-31

## 2019-03-31 RX ORDER — POTASSIUM CHLORIDE 20 MEQ
10 PACKET (EA) ORAL ONCE
Qty: 0 | Refills: 0 | Status: COMPLETED | OUTPATIENT
Start: 2019-03-31 | End: 2019-03-31

## 2019-03-31 RX ORDER — SODIUM CHLORIDE 9 MG/ML
500 INJECTION INTRAMUSCULAR; INTRAVENOUS; SUBCUTANEOUS ONCE
Qty: 0 | Refills: 0 | Status: COMPLETED | OUTPATIENT
Start: 2019-03-31 | End: 2019-03-31

## 2019-03-31 RX ORDER — TRANEXAMIC ACID 100 MG/ML
1000 INJECTION, SOLUTION INTRAVENOUS ONCE
Qty: 0 | Refills: 0 | Status: COMPLETED | OUTPATIENT
Start: 2019-03-31 | End: 2019-03-31

## 2019-03-31 RX ADMIN — Medication 50 GRAM(S): at 18:00

## 2019-03-31 RX ADMIN — Medication 1 MILLIGRAM(S): at 21:10

## 2019-03-31 RX ADMIN — Medication 10 MILLIGRAM(S): at 14:04

## 2019-03-31 RX ADMIN — ENOXAPARIN SODIUM 40 MILLIGRAM(S): 100 INJECTION SUBCUTANEOUS at 21:10

## 2019-03-31 RX ADMIN — SODIUM CHLORIDE 500 MILLILITER(S): 9 INJECTION INTRAMUSCULAR; INTRAVENOUS; SUBCUTANEOUS at 21:50

## 2019-03-31 RX ADMIN — PANTOPRAZOLE SODIUM 10 MG/HR: 20 TABLET, DELAYED RELEASE ORAL at 14:33

## 2019-03-31 RX ADMIN — CEFTRIAXONE 100 GRAM(S): 500 INJECTION, POWDER, FOR SOLUTION INTRAMUSCULAR; INTRAVENOUS at 14:04

## 2019-03-31 RX ADMIN — Medication 1 TABLET(S): at 21:10

## 2019-03-31 RX ADMIN — Medication 40 MILLIEQUIVALENT(S): at 19:12

## 2019-03-31 RX ADMIN — SODIUM CHLORIDE 2000 MILLILITER(S): 9 INJECTION INTRAMUSCULAR; INTRAVENOUS; SUBCUTANEOUS at 16:20

## 2019-03-31 RX ADMIN — TRANEXAMIC ACID 220 MILLIGRAM(S): 100 INJECTION, SOLUTION INTRAVENOUS at 14:05

## 2019-03-31 RX ADMIN — PANTOPRAZOLE SODIUM 10 MG/HR: 20 TABLET, DELAYED RELEASE ORAL at 19:45

## 2019-03-31 RX ADMIN — Medication 100 MILLIGRAM(S): at 21:12

## 2019-03-31 RX ADMIN — Medication 2 MILLIGRAM(S): at 21:10

## 2019-03-31 RX ADMIN — Medication 100 MILLIEQUIVALENT(S): at 19:40

## 2019-03-31 RX ADMIN — SODIUM CHLORIDE 80 MILLILITER(S): 9 INJECTION, SOLUTION INTRAVENOUS at 21:12

## 2019-03-31 RX ADMIN — SODIUM CHLORIDE 2000 MILLILITER(S): 9 INJECTION INTRAMUSCULAR; INTRAVENOUS; SUBCUTANEOUS at 17:00

## 2019-03-31 RX ADMIN — SODIUM CHLORIDE 2000 MILLILITER(S): 9 INJECTION INTRAMUSCULAR; INTRAVENOUS; SUBCUTANEOUS at 14:05

## 2019-03-31 RX ADMIN — ONDANSETRON 4 MILLIGRAM(S): 8 TABLET, FILM COATED ORAL at 21:10

## 2019-03-31 RX ADMIN — TRANEXAMIC ACID 1000 MILLIGRAM(S): 100 INJECTION, SOLUTION INTRAVENOUS at 15:00

## 2019-03-31 RX ADMIN — Medication 40 MILLIEQUIVALENT(S): at 21:50

## 2019-03-31 RX ADMIN — CEFTRIAXONE 1 GRAM(S): 500 INJECTION, POWDER, FOR SOLUTION INTRAMUSCULAR; INTRAVENOUS at 15:00

## 2019-03-31 NOTE — H&P ADULT - NSHPPHYSICALEXAM_GEN_ALL_CORE
PHYSICAL EXAM:  GENERAL: NAD, well-groomed, well-developed  HEAD:  Atraumatic, Normocephalic  EYES: EOMI, PERRLA, conjunctiva and sclera clear  ENMT: No tonsillar erythema, exudates, or enlargement; Moist mucous membranes, No lesions  NECK: Supple, No JVD, Normal thyroid  NERVOUS SYSTEM:  Alert & Oriented X3; Motor Strength 5/5.   Mild resting tremor.  CHEST/LUNG: Clear bilaterally; No rales, rhonchi, wheezing, or rubs  HEART: Regular rate and rhythm; No murmurs, rubs, or gallops  ABDOMEN: Soft,  Epigastric tenderness Nondistended; Bowel sounds present  EXTREMITIES:  2+ Peripheral Pulses, No clubbing, cyanosis, or edema  LYMPH: No lymphadenopathy noted  SKIN: No rashes or lesions

## 2019-03-31 NOTE — ED ADULT TRIAGE NOTE - CHIEF COMPLAINT QUOTE
c/o vomiting blood since yesterday admits to alcohol use 2-3 times a week c/o abdominal pain pt vomiting in triage

## 2019-03-31 NOTE — H&P ADULT - NSICDXFAMILYHX_GEN_ALL_CORE_FT
FAMILY HISTORY:  Father  Still living? Yes, Estimated age: Age Unknown  No pertinent family history in first degree relatives, Age at diagnosis: Age Unknown    Mother  Still living? Yes, Estimated age: Age Unknown  No pertinent family history in first degree relatives, Age at diagnosis: Age Unknown

## 2019-03-31 NOTE — H&P ADULT - NSHPLABSRESULTS_GEN_ALL_CORE
LABS:                        16.2   10.74 )-----------( 256      ( 31 Mar 2019 13:55 )             47.6     03-31    137  |  89<L>  |  13  ----------------------------<  149<H>  3.3<L>   |  25  |  1.49<H>    Ca    10.0      31 Mar 2019 13:55    TPro  9.6<H>  /  Alb  4.7  /  TBili  1.5<H>  /  DBili  x   /  AST  41<H>  /  ALT  35  /  AlkPhos  65  03-31        CAPILLARY BLOOD GLUCOSE        CARDIAC MARKERS ( 31 Mar 2019 13:55 )  <.015 ng/mL / x     / x     / x     / <1.0 ng/mL    < from: CT Angio Chest w/ IV Cont (03.31.19 @ 16:50) >      Impression: No evidence for aortic dissection or aneurysm.    Apparent mural thickening of the mid to distal esophagus. EGD is   recommended for further evaluation.    Nonspecific 5 mm right lower lobe lung nodule; follow-up chest CT may be   pursued in 12 months to ensure stability.    Hepatic steatosis again noted.      < end of copied text >      EXAM:  XR CHEST PORTABLE URGENT 1V                            PROCEDURE DATE:  03/31/2019          INTERPRETATION:  cough    A frontal chest film  demonstrates grossly clear lungs.  No acute   infiltrate or consolidation is  noted. The costophrenicangles are   grossly clear.    The heart size and vascular markings are within normal limits for   technique.      No mediastinal or hilar adenopathy is suggested. .     The osseous structures appear intact intact.     IMPRESSION:  Clear  lungs.  No acute airspace disease suggested.        < end of copied text >

## 2019-03-31 NOTE — ED ADULT NURSE NOTE - NSIMPLEMENTINTERV_GEN_ALL_ED
Implemented All Fall with Harm Risk Interventions:  [place] to call system. Call bell, personal items and telephone within reach. Instruct patient to call for assistance. Room bathroom lighting operational. Non-slip footwear when patient is off stretcher. Physically safe environment: no spills, clutter or unnecessary equipment. Stretcher in lowest position, wheels locked, appropriate side rails in place. Provide visual cue, wrist band, yellow gown, etc. Monitor gait and stability. Monitor for mental status changes and reorient to person, place, and time. Review medications for side effects contributing to fall risk. Reinforce activity limits and safety measures with patient and family. Provide visual clues: red socks.

## 2019-03-31 NOTE — ED PROVIDER NOTE - CLINICAL SUMMARY MEDICAL DECISION MAKING FREE TEXT BOX
54 yo M with vomiting, alcoholism, possible GIB, possible withdrawal related tachycardia, r/o perf, r/o variceal hemorrhage  -basic labs, coags, trop, ckmb, lipase, lactate, type and screen, CTA chest/abd/pel, cxr, ekg, ns hydration 2 L, ceftriaxone, TXA, Pepcid, Reglan

## 2019-03-31 NOTE — ED PROVIDER NOTE - PROGRESS NOTE DETAILS
vitals and lactate improved with meds/hydration, pt. now asking for food  will admit to medicine for further care, likely impending etoh withdrawal given chronicity of drinking  medicine aware

## 2019-03-31 NOTE — ED PROVIDER NOTE - PHYSICAL EXAMINATION
Vitals: tachy at 140, htn at 153/112, otherwise WNL  Gen: AAOx3, in distress, diaphoretic, actively vomiting into basin, no blood visible just clear fluid and food particles  Head: ncat, perrla, eomi b/l  Neck: supple, no lymphadenopathy, no midline deviation  Heart: rrr, no m/r/g  Lungs: CTA b/l, no rales/ronchi/wheezes  Abd: soft, nontender, non-distended, no rebound or guarding  Ext: no clubbing/cyanosis/edema  Neuro: sensation and muscle strength intact b/l, no focal weakness

## 2019-03-31 NOTE — H&P ADULT - HISTORY OF PRESENT ILLNESS
52 yo M with h/o Alcohol Use, PUD presents with  vomiting.   States  been vomiting all night, admits to drinking earlier, last drink yesterday, 1 pt/day.   Notes red blood in vomitus, associated with  epigastric pain. Denies CP, SOB, cough, palpitation, diarrhea , fever, chills, weakness, dysuria.

## 2019-03-31 NOTE — ED PROVIDER NOTE - OBJECTIVE STATEMENT
54 yo M with vomiting.  Pt. says he's been vomiting all night, admits to drinking earlier, last drink yesterday.  Pt. saw red blood in vomitus.  Pt. complains of epigastric and cp.  No other complaints or inciting event.   ROS: negative for fever, cough, headache, shortness of breath, diarrhea, rash, paresthesia, and weakness--all other systems reviewed are negative.   PMH: GERD, HLD; Meds: See EMR; SH: Denies smoking/drug use, + chronic alcoholism

## 2019-04-01 LAB
ALBUMIN SERPL ELPH-MCNC: 3.2 G/DL — LOW (ref 3.3–5)
ALP SERPL-CCNC: 43 U/L — SIGNIFICANT CHANGE UP (ref 40–120)
ALT FLD-CCNC: 22 U/L — SIGNIFICANT CHANGE UP (ref 12–78)
ANION GAP SERPL CALC-SCNC: 6 MMOL/L — SIGNIFICANT CHANGE UP (ref 5–17)
AST SERPL-CCNC: 37 U/L — SIGNIFICANT CHANGE UP (ref 15–37)
BASOPHILS # BLD AUTO: 0.03 K/UL — SIGNIFICANT CHANGE UP (ref 0–0.2)
BASOPHILS NFR BLD AUTO: 0.5 % — SIGNIFICANT CHANGE UP (ref 0–2)
BILIRUB DIRECT SERPL-MCNC: 0.26 MG/DL — HIGH (ref 0.05–0.2)
BILIRUB INDIRECT FLD-MCNC: 1.1 MG/DL — HIGH (ref 0.2–1)
BILIRUB SERPL-MCNC: 1.4 MG/DL — HIGH (ref 0.2–1.2)
BUN SERPL-MCNC: 10 MG/DL — SIGNIFICANT CHANGE UP (ref 7–23)
CALCIUM SERPL-MCNC: 8.3 MG/DL — LOW (ref 8.5–10.1)
CHLORIDE SERPL-SCNC: 108 MMOL/L — SIGNIFICANT CHANGE UP (ref 96–108)
CO2 SERPL-SCNC: 26 MMOL/L — SIGNIFICANT CHANGE UP (ref 22–31)
CREAT SERPL-MCNC: 0.89 MG/DL — SIGNIFICANT CHANGE UP (ref 0.5–1.3)
EOSINOPHIL # BLD AUTO: 0.02 K/UL — SIGNIFICANT CHANGE UP (ref 0–0.5)
EOSINOPHIL NFR BLD AUTO: 0.3 % — SIGNIFICANT CHANGE UP (ref 0–6)
GLUCOSE SERPL-MCNC: 103 MG/DL — HIGH (ref 70–99)
HCT VFR BLD CALC: 38.2 % — LOW (ref 39–50)
HGB BLD-MCNC: 12.3 G/DL — LOW (ref 13–17)
IMM GRANULOCYTES NFR BLD AUTO: 0.6 % — SIGNIFICANT CHANGE UP (ref 0–1.5)
LYMPHOCYTES # BLD AUTO: 1.32 K/UL — SIGNIFICANT CHANGE UP (ref 1–3.3)
LYMPHOCYTES # BLD AUTO: 20.9 % — SIGNIFICANT CHANGE UP (ref 13–44)
MAGNESIUM SERPL-MCNC: 2.5 MG/DL — SIGNIFICANT CHANGE UP (ref 1.6–2.6)
MCHC RBC-ENTMCNC: 27.8 PG — SIGNIFICANT CHANGE UP (ref 27–34)
MCHC RBC-ENTMCNC: 32.2 GM/DL — SIGNIFICANT CHANGE UP (ref 32–36)
MCV RBC AUTO: 86.4 FL — SIGNIFICANT CHANGE UP (ref 80–100)
MONOCYTES # BLD AUTO: 0.36 K/UL — SIGNIFICANT CHANGE UP (ref 0–0.9)
MONOCYTES NFR BLD AUTO: 5.7 % — SIGNIFICANT CHANGE UP (ref 2–14)
NEUTROPHILS # BLD AUTO: 4.54 K/UL — SIGNIFICANT CHANGE UP (ref 1.8–7.4)
NEUTROPHILS NFR BLD AUTO: 72 % — SIGNIFICANT CHANGE UP (ref 43–77)
NRBC # BLD: 0 /100 WBCS — SIGNIFICANT CHANGE UP (ref 0–0)
PHOSPHATE SERPL-MCNC: 2.2 MG/DL — LOW (ref 2.5–4.5)
PLATELET # BLD AUTO: 149 K/UL — LOW (ref 150–400)
POTASSIUM SERPL-MCNC: 3.7 MMOL/L — SIGNIFICANT CHANGE UP (ref 3.5–5.3)
POTASSIUM SERPL-SCNC: 3.7 MMOL/L — SIGNIFICANT CHANGE UP (ref 3.5–5.3)
PROT SERPL-MCNC: 7 GM/DL — SIGNIFICANT CHANGE UP (ref 6–8.3)
RBC # BLD: 4.42 M/UL — SIGNIFICANT CHANGE UP (ref 4.2–5.8)
RBC # FLD: 13 % — SIGNIFICANT CHANGE UP (ref 10.3–14.5)
SODIUM SERPL-SCNC: 140 MMOL/L — SIGNIFICANT CHANGE UP (ref 135–145)
WBC # BLD: 6.31 K/UL — SIGNIFICANT CHANGE UP (ref 3.8–10.5)
WBC # FLD AUTO: 6.31 K/UL — SIGNIFICANT CHANGE UP (ref 3.8–10.5)

## 2019-04-01 PROCEDURE — 99291 CRITICAL CARE FIRST HOUR: CPT

## 2019-04-01 RX ORDER — ACETAMINOPHEN 500 MG
650 TABLET ORAL EVERY 6 HOURS
Qty: 0 | Refills: 0 | Status: DISCONTINUED | OUTPATIENT
Start: 2019-04-01 | End: 2019-04-05

## 2019-04-01 RX ORDER — SUCRALFATE 1 G
1 TABLET ORAL
Qty: 0 | Refills: 0 | Status: DISCONTINUED | OUTPATIENT
Start: 2019-04-01 | End: 2019-04-04

## 2019-04-01 RX ORDER — POTASSIUM PHOSPHATE, MONOBASIC POTASSIUM PHOSPHATE, DIBASIC 236; 224 MG/ML; MG/ML
15 INJECTION, SOLUTION INTRAVENOUS ONCE
Qty: 0 | Refills: 0 | Status: COMPLETED | OUTPATIENT
Start: 2019-04-01 | End: 2019-04-01

## 2019-04-01 RX ADMIN — Medication 2 MILLIGRAM(S): at 02:14

## 2019-04-01 RX ADMIN — Medication 2 MILLIGRAM(S): at 06:18

## 2019-04-01 RX ADMIN — Medication 1 TABLET(S): at 11:10

## 2019-04-01 RX ADMIN — Medication 1.5 MILLIGRAM(S): at 21:27

## 2019-04-01 RX ADMIN — Medication 2 MILLIGRAM(S): at 17:38

## 2019-04-01 RX ADMIN — Medication 40 MILLIEQUIVALENT(S): at 11:11

## 2019-04-01 RX ADMIN — Medication 1 GRAM(S): at 14:19

## 2019-04-01 RX ADMIN — Medication 1 GRAM(S): at 17:38

## 2019-04-01 RX ADMIN — ENOXAPARIN SODIUM 40 MILLIGRAM(S): 100 INJECTION SUBCUTANEOUS at 21:27

## 2019-04-01 RX ADMIN — PANTOPRAZOLE SODIUM 10 MG/HR: 20 TABLET, DELAYED RELEASE ORAL at 04:04

## 2019-04-01 RX ADMIN — PANTOPRAZOLE SODIUM 10 MG/HR: 20 TABLET, DELAYED RELEASE ORAL at 17:39

## 2019-04-01 RX ADMIN — Medication 2 MILLIGRAM(S): at 14:18

## 2019-04-01 RX ADMIN — Medication 2 MILLIGRAM(S): at 11:10

## 2019-04-01 RX ADMIN — Medication 650 MILLIGRAM(S): at 22:39

## 2019-04-01 RX ADMIN — Medication 100 MILLIGRAM(S): at 11:10

## 2019-04-01 RX ADMIN — ONDANSETRON 4 MILLIGRAM(S): 8 TABLET, FILM COATED ORAL at 18:23

## 2019-04-01 RX ADMIN — SODIUM CHLORIDE 80 MILLILITER(S): 9 INJECTION, SOLUTION INTRAVENOUS at 17:39

## 2019-04-01 RX ADMIN — Medication 1 MILLIGRAM(S): at 11:10

## 2019-04-01 RX ADMIN — POTASSIUM PHOSPHATE, MONOBASIC POTASSIUM PHOSPHATE, DIBASIC 62.5 MILLIMOLE(S): 236; 224 INJECTION, SOLUTION INTRAVENOUS at 12:23

## 2019-04-02 DIAGNOSIS — F10.231 ALCOHOL DEPENDENCE WITH WITHDRAWAL DELIRIUM: ICD-10-CM

## 2019-04-02 LAB
ALBUMIN SERPL ELPH-MCNC: 3.4 G/DL — SIGNIFICANT CHANGE UP (ref 3.3–5)
ALP SERPL-CCNC: 43 U/L — SIGNIFICANT CHANGE UP (ref 40–120)
ALT FLD-CCNC: 32 U/L — SIGNIFICANT CHANGE UP (ref 12–78)
ANION GAP SERPL CALC-SCNC: 7 MMOL/L — SIGNIFICANT CHANGE UP (ref 5–17)
AST SERPL-CCNC: 48 U/L — HIGH (ref 15–37)
BILIRUB SERPL-MCNC: 1 MG/DL — SIGNIFICANT CHANGE UP (ref 0.2–1.2)
BUN SERPL-MCNC: 6 MG/DL — LOW (ref 7–23)
CALCIUM SERPL-MCNC: 8.4 MG/DL — LOW (ref 8.5–10.1)
CHLORIDE SERPL-SCNC: 110 MMOL/L — HIGH (ref 96–108)
CO2 SERPL-SCNC: 25 MMOL/L — SIGNIFICANT CHANGE UP (ref 22–31)
CREAT SERPL-MCNC: 0.78 MG/DL — SIGNIFICANT CHANGE UP (ref 0.5–1.3)
GLUCOSE SERPL-MCNC: 166 MG/DL — HIGH (ref 70–99)
HAV IGM SER-ACNC: SIGNIFICANT CHANGE UP
HBV CORE IGM SER-ACNC: SIGNIFICANT CHANGE UP
HBV SURFACE AG SER-ACNC: SIGNIFICANT CHANGE UP
HCT VFR BLD CALC: 44.9 % — SIGNIFICANT CHANGE UP (ref 39–50)
HCV AB S/CO SERPL IA: 0.09 S/CO — SIGNIFICANT CHANGE UP (ref 0–0.79)
HCV AB SERPL-IMP: SIGNIFICANT CHANGE UP
HGB BLD-MCNC: 14.4 G/DL — SIGNIFICANT CHANGE UP (ref 13–17)
LACTATE SERPL-SCNC: 1.3 MMOL/L — SIGNIFICANT CHANGE UP (ref 0.7–2)
MAGNESIUM SERPL-MCNC: 2.1 MG/DL — SIGNIFICANT CHANGE UP (ref 1.6–2.6)
MCHC RBC-ENTMCNC: 28 PG — SIGNIFICANT CHANGE UP (ref 27–34)
MCHC RBC-ENTMCNC: 32.1 GM/DL — SIGNIFICANT CHANGE UP (ref 32–36)
MCV RBC AUTO: 87.2 FL — SIGNIFICANT CHANGE UP (ref 80–100)
NRBC # BLD: 0 /100 WBCS — SIGNIFICANT CHANGE UP (ref 0–0)
PHOSPHATE SERPL-MCNC: 3.4 MG/DL — SIGNIFICANT CHANGE UP (ref 2.5–4.5)
PLATELET # BLD AUTO: 109 K/UL — LOW (ref 150–400)
POTASSIUM SERPL-MCNC: 3.9 MMOL/L — SIGNIFICANT CHANGE UP (ref 3.5–5.3)
POTASSIUM SERPL-SCNC: 3.9 MMOL/L — SIGNIFICANT CHANGE UP (ref 3.5–5.3)
PROT SERPL-MCNC: 6.8 GM/DL — SIGNIFICANT CHANGE UP (ref 6–8.3)
RBC # BLD: 5.15 M/UL — SIGNIFICANT CHANGE UP (ref 4.2–5.8)
RBC # FLD: 12.4 % — SIGNIFICANT CHANGE UP (ref 10.3–14.5)
SODIUM SERPL-SCNC: 142 MMOL/L — SIGNIFICANT CHANGE UP (ref 135–145)
WBC # BLD: 6.33 K/UL — SIGNIFICANT CHANGE UP (ref 3.8–10.5)
WBC # FLD AUTO: 6.33 K/UL — SIGNIFICANT CHANGE UP (ref 3.8–10.5)

## 2019-04-02 PROCEDURE — 99291 CRITICAL CARE FIRST HOUR: CPT

## 2019-04-02 RX ORDER — DEXMEDETOMIDINE HYDROCHLORIDE IN 0.9% SODIUM CHLORIDE 4 UG/ML
0.2 INJECTION INTRAVENOUS
Qty: 200 | Refills: 0 | Status: DISCONTINUED | OUTPATIENT
Start: 2019-04-02 | End: 2019-04-09

## 2019-04-02 RX ORDER — PHENOBARBITAL 60 MG
260 TABLET ORAL ONCE
Qty: 0 | Refills: 0 | Status: DISCONTINUED | OUTPATIENT
Start: 2019-04-02 | End: 2019-04-02

## 2019-04-02 RX ORDER — PHENOBARBITAL 60 MG
260 TABLET ORAL
Qty: 0 | Refills: 0 | Status: DISCONTINUED | OUTPATIENT
Start: 2019-04-02 | End: 2019-04-04

## 2019-04-02 RX ORDER — PHENOBARBITAL 60 MG
130 TABLET ORAL EVERY 6 HOURS
Qty: 0 | Refills: 0 | Status: DISCONTINUED | OUTPATIENT
Start: 2019-04-02 | End: 2019-04-06

## 2019-04-02 RX ORDER — SODIUM CHLORIDE 9 MG/ML
1000 INJECTION, SOLUTION INTRAVENOUS ONCE
Qty: 0 | Refills: 0 | Status: COMPLETED | OUTPATIENT
Start: 2019-04-02 | End: 2019-04-02

## 2019-04-02 RX ORDER — HALOPERIDOL DECANOATE 100 MG/ML
5 INJECTION INTRAMUSCULAR ONCE
Qty: 0 | Refills: 0 | Status: COMPLETED | OUTPATIENT
Start: 2019-04-02 | End: 2019-04-02

## 2019-04-02 RX ORDER — PHENOBARBITAL 60 MG
130 TABLET ORAL
Qty: 0 | Refills: 0 | Status: DISCONTINUED | OUTPATIENT
Start: 2019-04-02 | End: 2019-04-06

## 2019-04-02 RX ADMIN — DEXMEDETOMIDINE HYDROCHLORIDE IN 0.9% SODIUM CHLORIDE 3.72 MICROGRAM(S)/KG/HR: 4 INJECTION INTRAVENOUS at 15:00

## 2019-04-02 RX ADMIN — Medication 260 MILLIGRAM(S): at 05:37

## 2019-04-02 RX ADMIN — PANTOPRAZOLE SODIUM 10 MG/HR: 20 TABLET, DELAYED RELEASE ORAL at 18:00

## 2019-04-02 RX ADMIN — Medication 2 MILLIGRAM(S): at 00:33

## 2019-04-02 RX ADMIN — DEXMEDETOMIDINE HYDROCHLORIDE IN 0.9% SODIUM CHLORIDE 3.72 MICROGRAM(S)/KG/HR: 4 INJECTION INTRAVENOUS at 10:46

## 2019-04-02 RX ADMIN — HALOPERIDOL DECANOATE 5 MILLIGRAM(S): 100 INJECTION INTRAMUSCULAR at 06:51

## 2019-04-02 RX ADMIN — Medication 130 MILLIGRAM(S): at 20:39

## 2019-04-02 RX ADMIN — ENOXAPARIN SODIUM 40 MILLIGRAM(S): 100 INJECTION SUBCUTANEOUS at 20:40

## 2019-04-02 RX ADMIN — Medication 2 MILLIGRAM(S): at 03:28

## 2019-04-02 RX ADMIN — Medication 2 MILLIGRAM(S): at 04:49

## 2019-04-02 RX ADMIN — Medication 260 MILLIGRAM(S): at 22:27

## 2019-04-02 RX ADMIN — SODIUM CHLORIDE 80 MILLILITER(S): 9 INJECTION, SOLUTION INTRAVENOUS at 18:21

## 2019-04-02 RX ADMIN — DEXMEDETOMIDINE HYDROCHLORIDE IN 0.9% SODIUM CHLORIDE 3.72 MICROGRAM(S)/KG/HR: 4 INJECTION INTRAVENOUS at 20:23

## 2019-04-02 RX ADMIN — PANTOPRAZOLE SODIUM 10 MG/HR: 20 TABLET, DELAYED RELEASE ORAL at 02:39

## 2019-04-02 RX ADMIN — Medication 260 MILLIGRAM(S): at 05:45

## 2019-04-02 RX ADMIN — PANTOPRAZOLE SODIUM 10 MG/HR: 20 TABLET, DELAYED RELEASE ORAL at 08:44

## 2019-04-02 RX ADMIN — Medication 1.5 MILLIGRAM(S): at 02:38

## 2019-04-02 RX ADMIN — DEXMEDETOMIDINE HYDROCHLORIDE IN 0.9% SODIUM CHLORIDE 3.72 MICROGRAM(S)/KG/HR: 4 INJECTION INTRAVENOUS at 17:00

## 2019-04-02 RX ADMIN — Medication 2 MILLIGRAM(S): at 04:31

## 2019-04-02 RX ADMIN — Medication 260 MILLIGRAM(S): at 06:42

## 2019-04-02 RX ADMIN — Medication 130 MILLIGRAM(S): at 11:47

## 2019-04-02 RX ADMIN — DEXMEDETOMIDINE HYDROCHLORIDE IN 0.9% SODIUM CHLORIDE 3.72 MICROGRAM(S)/KG/HR: 4 INJECTION INTRAVENOUS at 13:00

## 2019-04-02 RX ADMIN — DEXMEDETOMIDINE HYDROCHLORIDE IN 0.9% SODIUM CHLORIDE 3.72 MICROGRAM(S)/KG/HR: 4 INJECTION INTRAVENOUS at 08:59

## 2019-04-02 RX ADMIN — SODIUM CHLORIDE 80 MILLILITER(S): 9 INJECTION, SOLUTION INTRAVENOUS at 08:00

## 2019-04-02 RX ADMIN — DEXMEDETOMIDINE HYDROCHLORIDE IN 0.9% SODIUM CHLORIDE 3.72 MICROGRAM(S)/KG/HR: 4 INJECTION INTRAVENOUS at 07:30

## 2019-04-02 RX ADMIN — PANTOPRAZOLE SODIUM 10 MG/HR: 20 TABLET, DELAYED RELEASE ORAL at 08:47

## 2019-04-02 RX ADMIN — Medication 1 GRAM(S): at 00:12

## 2019-04-02 RX ADMIN — Medication 260 MILLIGRAM(S): at 04:59

## 2019-04-02 RX ADMIN — Medication 650 MILLIGRAM(S): at 00:23

## 2019-04-02 RX ADMIN — DEXMEDETOMIDINE HYDROCHLORIDE IN 0.9% SODIUM CHLORIDE 3.72 MICROGRAM(S)/KG/HR: 4 INJECTION INTRAVENOUS at 06:51

## 2019-04-02 NOTE — CONSULT NOTE ADULT - SUBJECTIVE AND OBJECTIVE BOX
Patient is a 53y old  Male who presents with a chief complaint of Vomiting (01 Apr 2019 12:49)      52 yo M with h/o Alcohol Use, PUD presents with  vomiting.   States  been vomiting all night, admits to drinking earlier, last drink yesterday, 1 pt/day.   Notes red blood in vomitus, associated with  epigastric pain. Denies CP, SOB, cough, palpitation, diarrhea , fever, chills, weakness, dysuria. (31 Mar 2019 21:37)  Code gray called with CIWA 21, patient received total of 11mg of ativan since 7pm, and 3x 260mg of pehnobarbitol, CIWA still reamin above 20, patient started on precedex drip and transferred to critical care for further observation.      PAST MEDICAL & SURGICAL HISTORY:  EtOH dependence  Mixed hyperlipidemia  PUD (peptic ulcer disease)  No significant past surgical history    FAMILY HISTORY:  No pertinent family history in first degree relatives (Father, Mother)    Social History: Allergies    No Known Allergies    Intolerances        MEDICATIONS  (STANDING):  dexmedetomidine Infusion 0.2 MICROgram(s)/kG/Hr (3.72 mL/Hr) IV Continuous <Continuous>  dextrose 5% + sodium chloride 0.45%. 1000 milliLiter(s) (80 mL/Hr) IV Continuous <Continuous>  enoxaparin Injectable 40 milliGRAM(s) SubCutaneous every 24 hours  folic acid 1 milliGRAM(s) Oral daily  LORazepam   Injectable 1.5 milliGRAM(s) IV Push every 4 hours  LORazepam   Injectable 1 milliGRAM(s) IV Push every 4 hours  LORazepam   Injectable   IV Push   multivitamin 1 Tablet(s) Oral daily  pantoprazole Infusion 8 mG/Hr (10 mL/Hr) IV Continuous <Continuous>  PHENobarbital Injectable 260 milliGRAM(s) IV Push once  potassium chloride    Tablet ER 40 milliEquivalent(s) Oral daily  sucralfate 1 Gram(s) Oral four times a day  thiamine 100 milliGRAM(s) Oral daily    MEDICATIONS  (PRN):  acetaminophen   Tablet .. 650 milliGRAM(s) Oral every 6 hours PRN Mild Pain (1 - 3)  LORazepam   Injectable 2 milliGRAM(s) IV Push every 1 hour PRN CIWA-Ar score 8 or greater  ondansetron Injectable 4 milliGRAM(s) IV Push every 6 hours PRN Nausea and/or Vomiting      REVIEW OF SYSTEMS:    CONSTITUTIONAL: No fever, weight loss, or fatigue  EYES: No eye pain, visual disturbances, or discharge  ENMT:  No difficulty hearing, tinnitus, vertigo; No sinus or throat pain  NECK: No pain or stiffness  BREASTS: No pain, masses, or nipple discharge  RESPIRATORY: No cough, wheezing, chills or hemoptysis; No shortness of breath  CARDIOVASCULAR: No chest pain, palpitations, dizziness, or leg swelling  GASTROINTESTINAL: No abdominal or epigastric pain. No nausea, vomiting, or hematemesis; No diarrhea or constipation. No melena or hematochezia.  GENITOURINARY: No dysuria, frequency, hematuria, or incontinence  NEUROLOGICAL: No headaches, memory loss, loss of strength, numbness, or tremors  SKIN: No itching, burning, rashes, or lesions   LYMPH NODES: No enlarged glands  ENDOCRINE: No heat or cold intolerance; No hair loss  MUSCULOSKELETAL: No joint pain or swelling; No muscle, back, or extremity pain  PSYCHIATRIC: No depression, anxiety, mood swings, or difficulty sleeping  HEME/LYMPH: No easy bruising, or bleeding gums  ALLERGY AND IMMUNOLOGIC: No hives or eczema      PHYSICAL EXAM:  ICU Vital Signs Last 24 Hrs  T(C): 37.3 (02 Apr 2019 00:11), Max: 37.3 (02 Apr 2019 00:11)  T(F): 99.1 (02 Apr 2019 00:11), Max: 99.1 (02 Apr 2019 00:11)  HR: 69 (02 Apr 2019 02:54) (69 - 90)  BP: 136/91 (02 Apr 2019 00:11) (130/83 - 138/88)  BP(mean): --  ABP: --  ABP(mean): --  RR: 18 (02 Apr 2019 00:11) (16 - 18)  SpO2: 97% (02 Apr 2019 00:11) (97% - 99%)    GENERAL: NAD, well-groomed, well-developed  HEAD:  Atraumatic, Normocephalic  EYES: EOMI, PERRLA, conjunctiva and sclera clear  NECK: Supple, No JVD, Normal thyroid  NERVOUS SYSTEM:  Alert & Oriented X3, Good concentration;   Motor Strength 5/5 B/L upper and lower extremities; DTRs 2+ intact and symmetric  CHEST/LUNG: CTAbilaterally; No rales, rhonchi, wheezing, or rubs  HEART: Regular rate and rhythm; No murmurs, rubs, or gallops  ABDOMEN: Soft, Nontender, Nondistended; Bowel sounds present  EXTREMITIES:  2+ Peripheral Pulses, No clubbing, cyanosis, or edema  SKIN: No rashes or lesions        LABS:                        12.3   6.31  )-----------( 149      ( 01 Apr 2019 07:48 )             38.2     04-01    140  |  108  |  10  ----------------------------<  103<H>  3.7   |  26  |  0.89    Ca    8.3<L>      01 Apr 2019 07:48  Phos  2.2     04-01  Mg     2.5     04-01    TPro  7.0  /  Alb  3.2<L>  /  TBili  1.4<H>  /  DBili  .26<H>  /  AST  37  /  ALT  22  /  AlkPhos  43  04-01              RADIOLOGY & ADDITIONAL STUDIES:    EKG:            CRITICAL CARE TIME SPENT: Patient is a 53y old  Male who presents with a chief complaint of Vomiting (01 Apr 2019 12:49)      52 yo M with h/o Alcohol Use, PUD presents with  vomiting.   States  been vomiting all night, admits to drinking earlier, last drink yesterday, 1 pt/day.   Notes red blood in vomitus, associated with  epigastric pain. Denies CP, SOB, cough, palpitation, diarrhea , fever, chills, weakness, dysuria. (31 Mar 2019 21:37)  Code gray called with CIWA 21, patient received total of 11mg of ativan since 7pm, and 3x 260mg of pehnobarbitol, CIWA still reamin above 20, patient started on precedex drip and transferred to critical care for further observation.      PAST MEDICAL & SURGICAL HISTORY:  EtOH dependence  Mixed hyperlipidemia  PUD (peptic ulcer disease)  No significant past surgical history    FAMILY HISTORY:  No pertinent family history in first degree relatives (Father, Mother)    Social History: Allergies    No Known Allergies    Intolerances        MEDICATIONS  (STANDING):  dexmedetomidine Infusion 0.2 MICROgram(s)/kG/Hr (3.72 mL/Hr) IV Continuous <Continuous>  dextrose 5% + sodium chloride 0.45%. 1000 milliLiter(s) (80 mL/Hr) IV Continuous <Continuous>  enoxaparin Injectable 40 milliGRAM(s) SubCutaneous every 24 hours  folic acid 1 milliGRAM(s) Oral daily  LORazepam   Injectable 1.5 milliGRAM(s) IV Push every 4 hours  LORazepam   Injectable 1 milliGRAM(s) IV Push every 4 hours  LORazepam   Injectable   IV Push   multivitamin 1 Tablet(s) Oral daily  pantoprazole Infusion 8 mG/Hr (10 mL/Hr) IV Continuous <Continuous>  PHENobarbital Injectable 260 milliGRAM(s) IV Push once  potassium chloride    Tablet ER 40 milliEquivalent(s) Oral daily  sucralfate 1 Gram(s) Oral four times a day  thiamine 100 milliGRAM(s) Oral daily    MEDICATIONS  (PRN):  acetaminophen   Tablet .. 650 milliGRAM(s) Oral every 6 hours PRN Mild Pain (1 - 3)  LORazepam   Injectable 2 milliGRAM(s) IV Push every 1 hour PRN CIWA-Ar score 8 or greater  ondansetron Injectable 4 milliGRAM(s) IV Push every 6 hours PRN Nausea and/or Vomiting      REVIEW OF SYSTEMS:    patient agitated, cannot obtain      PHYSICAL EXAM:    T(C): 37.3 (02 Apr 2019 00:11), Max: 37.3 (02 Apr 2019 00:11)  T(F): 99.1 (02 Apr 2019 00:11), Max: 99.1 (02 Apr 2019 00:11)  HR: 69 (02 Apr 2019 02:54) (69 - 90)  BP: 136/91 (02 Apr 2019 00:11) (130/83 - 138/88)  RR: 18 (02 Apr 2019 00:11) (16 - 18)  SpO2: 97% (02 Apr 2019 00:11) (97% - 99%)    GENERAL:agitated, trying to get off the bed, fighting with then nurse  NERVOUS SYSTEM:  Alert & Oriented X3,   CHEST/LUNG: CTA bilaterally; No rales, rhonchi, wheezing, or rubs  HEART: Regular rate and rhythm; No murmurs, rubs, or gallops          LABS:                        12.3   6.31  )-----------( 149      ( 01 Apr 2019 07:48 )             38.2     04-01    140  |  108  |  10  ----------------------------<  103<H>  3.7   |  26  |  0.89    Ca    8.3<L>      01 Apr 2019 07:48  Phos  2.2     04-01  Mg     2.5     04-01    TPro  7.0  /  Alb  3.2<L>  /  TBili  1.4<H>  /  DBili  .26<H>  /  AST  37  /  ALT  22  /  AlkPhos  43  04-01              RADIOLOGY & ADDITIONAL STUDIES:    EKG:            CRITICAL CARE TIME SPENT:

## 2019-04-02 NOTE — PROVIDER CONTACT NOTE (EICU) - BACKGROUND
54 yo M with h/o Alcohol Use, PUD presents with  vomiting.   States  been vomiting all night, admits to drinking earlier, last drink yesterday, 1 pt/day.   Notes red blood in vomitus, associated with  epigastric pain. Denies CP, SOB, cough, palpitation, diarrhea , fever, chills, weakness, dysuria. (31 Mar 2019 21:37)  Code gray called with CIWA 21, patient received total of 11mg of ativan since 7pm, and 3x 260mg of pehnobarbitol, CIWA still reamin above 20, patient started on precedex drip and transferred to critical care for further observation.

## 2019-04-02 NOTE — PROVIDER CONTACT NOTE (EICU) - ASSESSMENT
arrived in MICU started on precedex 0.7 , will give 260 x 1 more dose of phenobarbital if this does not calm him will require intubation.

## 2019-04-02 NOTE — CHART NOTE - NSCHARTNOTEFT_GEN_A_CORE
Called for Code Gray    Patient known alcohol abuse, admitted overnight on 04/01 with vomiting, last drink on 3/31.  RN manager called me to attention, came over to see patient.   sitting on the bed, fighting with 2 security guards at the bedside.    Patient received, since this shift ativan 2x 1.5mg and 4x 2mg, total of 11mg ativan.  Ordered 260mg of phenobarbitol now, given by RN.  and constant observation.    will notify medicine PA  will monitor.

## 2019-04-02 NOTE — CONSULT NOTE ADULT - ATTENDING COMMENTS
Gonelma: I have seen and examined the patient face to face, have reviewed and addended the HPI, PE and a/p as necessary.     Patient seen and examined on AM rounds.  Please see progress note from today.

## 2019-04-02 NOTE — CONSULT NOTE ADULT - ASSESSMENT
Patient is a 53 year old of male with pmhx of alcohol abuse, p/w vomiting admitted to medicine floor for gastritis, code gray with CIWA of 21 after 3x 260mg of phenobarbitol.   -transfer to critical care  -CIWA as per protocol  -constant observation  - Patient is a 53 year old of male with pmhx of alcohol abuse, p/w vomiting admitted to medicine floor for gastritis, code gray with CIWA of 21 after 3x 260mg of phenobarbitol transfer to critical care for severe agitation with alchol withdrawal.   -transfer to critical care  -CIWA as per protocol  -constant observation  -precedex drip to RASS 0 to -1  -phenobarbitol 130mg every 6h  -phenobarb 130mg every 1hour for CIWA >8  -phenobarb 260mg every 15min for CIWA >18  -thiamin/folic acid/ MV

## 2019-04-03 LAB
ALBUMIN SERPL ELPH-MCNC: 3 G/DL — LOW (ref 3.3–5)
ALP SERPL-CCNC: 44 U/L — SIGNIFICANT CHANGE UP (ref 40–120)
ALT FLD-CCNC: 30 U/L — SIGNIFICANT CHANGE UP (ref 12–78)
ANION GAP SERPL CALC-SCNC: 9 MMOL/L — SIGNIFICANT CHANGE UP (ref 5–17)
AST SERPL-CCNC: 38 U/L — HIGH (ref 15–37)
BILIRUB SERPL-MCNC: 0.9 MG/DL — SIGNIFICANT CHANGE UP (ref 0.2–1.2)
BUN SERPL-MCNC: 7 MG/DL — SIGNIFICANT CHANGE UP (ref 7–23)
CALCIUM SERPL-MCNC: 8.2 MG/DL — LOW (ref 8.5–10.1)
CHLORIDE SERPL-SCNC: 111 MMOL/L — HIGH (ref 96–108)
CO2 SERPL-SCNC: 23 MMOL/L — SIGNIFICANT CHANGE UP (ref 22–31)
CREAT SERPL-MCNC: 0.92 MG/DL — SIGNIFICANT CHANGE UP (ref 0.5–1.3)
GLUCOSE SERPL-MCNC: 101 MG/DL — HIGH (ref 70–99)
HCT VFR BLD CALC: 38.4 % — LOW (ref 39–50)
HGB BLD-MCNC: 12.3 G/DL — LOW (ref 13–17)
MAGNESIUM SERPL-MCNC: 1.9 MG/DL — SIGNIFICANT CHANGE UP (ref 1.6–2.6)
MCHC RBC-ENTMCNC: 28 PG — SIGNIFICANT CHANGE UP (ref 27–34)
MCHC RBC-ENTMCNC: 32 GM/DL — SIGNIFICANT CHANGE UP (ref 32–36)
MCV RBC AUTO: 87.5 FL — SIGNIFICANT CHANGE UP (ref 80–100)
NRBC # BLD: 0 /100 WBCS — SIGNIFICANT CHANGE UP (ref 0–0)
PHOSPHATE SERPL-MCNC: 2.7 MG/DL — SIGNIFICANT CHANGE UP (ref 2.5–4.5)
PLATELET # BLD AUTO: 135 K/UL — LOW (ref 150–400)
POTASSIUM SERPL-MCNC: 3.7 MMOL/L — SIGNIFICANT CHANGE UP (ref 3.5–5.3)
POTASSIUM SERPL-SCNC: 3.7 MMOL/L — SIGNIFICANT CHANGE UP (ref 3.5–5.3)
PROT SERPL-MCNC: 6.3 GM/DL — SIGNIFICANT CHANGE UP (ref 6–8.3)
RBC # BLD: 4.39 M/UL — SIGNIFICANT CHANGE UP (ref 4.2–5.8)
RBC # FLD: 12.4 % — SIGNIFICANT CHANGE UP (ref 10.3–14.5)
SODIUM SERPL-SCNC: 143 MMOL/L — SIGNIFICANT CHANGE UP (ref 135–145)
WBC # BLD: 5.99 K/UL — SIGNIFICANT CHANGE UP (ref 3.8–10.5)
WBC # FLD AUTO: 5.99 K/UL — SIGNIFICANT CHANGE UP (ref 3.8–10.5)

## 2019-04-03 PROCEDURE — 99291 CRITICAL CARE FIRST HOUR: CPT

## 2019-04-03 RX ADMIN — DEXMEDETOMIDINE HYDROCHLORIDE IN 0.9% SODIUM CHLORIDE 3.72 MICROGRAM(S)/KG/HR: 4 INJECTION INTRAVENOUS at 09:32

## 2019-04-03 RX ADMIN — Medication 260 MILLIGRAM(S): at 04:41

## 2019-04-03 RX ADMIN — Medication 1 GRAM(S): at 17:17

## 2019-04-03 RX ADMIN — DEXMEDETOMIDINE HYDROCHLORIDE IN 0.9% SODIUM CHLORIDE 3.72 MICROGRAM(S)/KG/HR: 4 INJECTION INTRAVENOUS at 04:37

## 2019-04-03 RX ADMIN — SODIUM CHLORIDE 80 MILLILITER(S): 9 INJECTION, SOLUTION INTRAVENOUS at 20:55

## 2019-04-03 RX ADMIN — Medication 130 MILLIGRAM(S): at 17:17

## 2019-04-03 RX ADMIN — ENOXAPARIN SODIUM 40 MILLIGRAM(S): 100 INJECTION SUBCUTANEOUS at 20:51

## 2019-04-03 RX ADMIN — PANTOPRAZOLE SODIUM 10 MG/HR: 20 TABLET, DELAYED RELEASE ORAL at 12:34

## 2019-04-03 RX ADMIN — Medication 1 GRAM(S): at 11:07

## 2019-04-03 RX ADMIN — PANTOPRAZOLE SODIUM 10 MG/HR: 20 TABLET, DELAYED RELEASE ORAL at 04:55

## 2019-04-03 RX ADMIN — Medication 130 MILLIGRAM(S): at 23:21

## 2019-04-03 RX ADMIN — DEXMEDETOMIDINE HYDROCHLORIDE IN 0.9% SODIUM CHLORIDE 3.72 MICROGRAM(S)/KG/HR: 4 INJECTION INTRAVENOUS at 17:17

## 2019-04-03 RX ADMIN — DEXMEDETOMIDINE HYDROCHLORIDE IN 0.9% SODIUM CHLORIDE 3.72 MICROGRAM(S)/KG/HR: 4 INJECTION INTRAVENOUS at 20:55

## 2019-04-03 RX ADMIN — Medication 40 MILLIEQUIVALENT(S): at 11:07

## 2019-04-03 RX ADMIN — Medication 1 GRAM(S): at 23:25

## 2019-04-03 RX ADMIN — Medication 130 MILLIGRAM(S): at 11:10

## 2019-04-03 RX ADMIN — Medication 1 TABLET(S): at 11:07

## 2019-04-03 RX ADMIN — Medication 100 MILLIGRAM(S): at 11:07

## 2019-04-03 RX ADMIN — Medication 1 MILLIGRAM(S): at 11:07

## 2019-04-03 RX ADMIN — PANTOPRAZOLE SODIUM 10 MG/HR: 20 TABLET, DELAYED RELEASE ORAL at 21:29

## 2019-04-03 RX ADMIN — Medication 130 MILLIGRAM(S): at 10:14

## 2019-04-03 RX ADMIN — SODIUM CHLORIDE 80 MILLILITER(S): 9 INJECTION, SOLUTION INTRAVENOUS at 04:55

## 2019-04-03 RX ADMIN — SODIUM CHLORIDE 80 MILLILITER(S): 9 INJECTION, SOLUTION INTRAVENOUS at 16:38

## 2019-04-03 RX ADMIN — DEXMEDETOMIDINE HYDROCHLORIDE IN 0.9% SODIUM CHLORIDE 3.72 MICROGRAM(S)/KG/HR: 4 INJECTION INTRAVENOUS at 12:33

## 2019-04-04 LAB
ANION GAP SERPL CALC-SCNC: 7 MMOL/L — SIGNIFICANT CHANGE UP (ref 5–17)
BUN SERPL-MCNC: 7 MG/DL — SIGNIFICANT CHANGE UP (ref 7–23)
CALCIUM SERPL-MCNC: 8.5 MG/DL — SIGNIFICANT CHANGE UP (ref 8.5–10.1)
CHLORIDE SERPL-SCNC: 111 MMOL/L — HIGH (ref 96–108)
CO2 SERPL-SCNC: 22 MMOL/L — SIGNIFICANT CHANGE UP (ref 22–31)
CREAT SERPL-MCNC: 0.75 MG/DL — SIGNIFICANT CHANGE UP (ref 0.5–1.3)
GLUCOSE SERPL-MCNC: 105 MG/DL — HIGH (ref 70–99)
HCT VFR BLD CALC: 42.1 % — SIGNIFICANT CHANGE UP (ref 39–50)
HGB BLD-MCNC: 13.3 G/DL — SIGNIFICANT CHANGE UP (ref 13–17)
MAGNESIUM SERPL-MCNC: 2 MG/DL — SIGNIFICANT CHANGE UP (ref 1.6–2.6)
MCHC RBC-ENTMCNC: 27.3 PG — SIGNIFICANT CHANGE UP (ref 27–34)
MCHC RBC-ENTMCNC: 31.6 GM/DL — LOW (ref 32–36)
MCV RBC AUTO: 86.3 FL — SIGNIFICANT CHANGE UP (ref 80–100)
NRBC # BLD: 0 /100 WBCS — SIGNIFICANT CHANGE UP (ref 0–0)
PHOSPHATE SERPL-MCNC: 3.8 MG/DL — SIGNIFICANT CHANGE UP (ref 2.5–4.5)
PLATELET # BLD AUTO: 146 K/UL — LOW (ref 150–400)
POTASSIUM SERPL-MCNC: 4.2 MMOL/L — SIGNIFICANT CHANGE UP (ref 3.5–5.3)
POTASSIUM SERPL-SCNC: 4.2 MMOL/L — SIGNIFICANT CHANGE UP (ref 3.5–5.3)
RBC # BLD: 4.88 M/UL — SIGNIFICANT CHANGE UP (ref 4.2–5.8)
RBC # FLD: 12.4 % — SIGNIFICANT CHANGE UP (ref 10.3–14.5)
SODIUM SERPL-SCNC: 140 MMOL/L — SIGNIFICANT CHANGE UP (ref 135–145)
WBC # BLD: 5 K/UL — SIGNIFICANT CHANGE UP (ref 3.8–10.5)
WBC # FLD AUTO: 5 K/UL — SIGNIFICANT CHANGE UP (ref 3.8–10.5)

## 2019-04-04 PROCEDURE — 99291 CRITICAL CARE FIRST HOUR: CPT

## 2019-04-04 PROCEDURE — 71045 X-RAY EXAM CHEST 1 VIEW: CPT | Mod: 26

## 2019-04-04 RX ORDER — PHENOBARBITAL 60 MG
260 TABLET ORAL ONCE
Qty: 0 | Refills: 0 | Status: DISCONTINUED | OUTPATIENT
Start: 2019-04-04 | End: 2019-04-13

## 2019-04-04 RX ORDER — SUCRALFATE 1 G
1 TABLET ORAL
Qty: 0 | Refills: 0 | Status: DISCONTINUED | OUTPATIENT
Start: 2019-04-04 | End: 2019-04-13

## 2019-04-04 RX ORDER — PANTOPRAZOLE SODIUM 20 MG/1
40 TABLET, DELAYED RELEASE ORAL EVERY 12 HOURS
Qty: 0 | Refills: 0 | Status: DISCONTINUED | OUTPATIENT
Start: 2019-04-04 | End: 2019-04-13

## 2019-04-04 RX ORDER — SUCRALFATE 1 G
1 TABLET ORAL
Qty: 0 | Refills: 0 | Status: DISCONTINUED | OUTPATIENT
Start: 2019-04-04 | End: 2019-04-04

## 2019-04-04 RX ADMIN — DEXMEDETOMIDINE HYDROCHLORIDE IN 0.9% SODIUM CHLORIDE 3.72 MICROGRAM(S)/KG/HR: 4 INJECTION INTRAVENOUS at 19:23

## 2019-04-04 RX ADMIN — ENOXAPARIN SODIUM 40 MILLIGRAM(S): 100 INJECTION SUBCUTANEOUS at 21:00

## 2019-04-04 RX ADMIN — SODIUM CHLORIDE 80 MILLILITER(S): 9 INJECTION, SOLUTION INTRAVENOUS at 19:23

## 2019-04-04 RX ADMIN — Medication 1 GRAM(S): at 17:10

## 2019-04-04 RX ADMIN — DEXMEDETOMIDINE HYDROCHLORIDE IN 0.9% SODIUM CHLORIDE 3.72 MICROGRAM(S)/KG/HR: 4 INJECTION INTRAVENOUS at 22:45

## 2019-04-04 RX ADMIN — Medication 1 GRAM(S): at 23:10

## 2019-04-04 RX ADMIN — Medication 130 MILLIGRAM(S): at 23:54

## 2019-04-04 RX ADMIN — Medication 130 MILLIGRAM(S): at 00:23

## 2019-04-04 RX ADMIN — Medication 130 MILLIGRAM(S): at 16:57

## 2019-04-04 RX ADMIN — Medication 260 MILLIGRAM(S): at 01:13

## 2019-04-04 RX ADMIN — Medication 2 MILLIGRAM(S): at 01:55

## 2019-04-04 RX ADMIN — Medication 130 MILLIGRAM(S): at 17:42

## 2019-04-04 RX ADMIN — DEXMEDETOMIDINE HYDROCHLORIDE IN 0.9% SODIUM CHLORIDE 3.72 MICROGRAM(S)/KG/HR: 4 INJECTION INTRAVENOUS at 16:42

## 2019-04-04 RX ADMIN — SODIUM CHLORIDE 80 MILLILITER(S): 9 INJECTION, SOLUTION INTRAVENOUS at 16:42

## 2019-04-04 RX ADMIN — DEXMEDETOMIDINE HYDROCHLORIDE IN 0.9% SODIUM CHLORIDE 3.72 MICROGRAM(S)/KG/HR: 4 INJECTION INTRAVENOUS at 12:45

## 2019-04-04 RX ADMIN — PANTOPRAZOLE SODIUM 40 MILLIGRAM(S): 20 TABLET, DELAYED RELEASE ORAL at 17:10

## 2019-04-04 RX ADMIN — Medication 40 MILLIEQUIVALENT(S): at 12:45

## 2019-04-04 RX ADMIN — Medication 1 GRAM(S): at 12:45

## 2019-04-04 RX ADMIN — DEXMEDETOMIDINE HYDROCHLORIDE IN 0.9% SODIUM CHLORIDE 3.72 MICROGRAM(S)/KG/HR: 4 INJECTION INTRAVENOUS at 01:45

## 2019-04-04 RX ADMIN — Medication 130 MILLIGRAM(S): at 16:07

## 2019-04-04 RX ADMIN — Medication 130 MILLIGRAM(S): at 23:08

## 2019-04-04 RX ADMIN — Medication 1 TABLET(S): at 12:45

## 2019-04-04 RX ADMIN — SODIUM CHLORIDE 80 MILLILITER(S): 9 INJECTION, SOLUTION INTRAVENOUS at 06:01

## 2019-04-04 RX ADMIN — Medication 130 MILLIGRAM(S): at 11:37

## 2019-04-04 RX ADMIN — Medication 130 MILLIGRAM(S): at 06:01

## 2019-04-04 RX ADMIN — Medication 130 MILLIGRAM(S): at 12:54

## 2019-04-04 RX ADMIN — Medication 130 MILLIGRAM(S): at 20:01

## 2019-04-04 RX ADMIN — Medication 1 MILLIGRAM(S): at 13:12

## 2019-04-04 NOTE — DIETITIAN INITIAL EVALUATION ADULT. - PERTINENT MEDS FT
MEDICATIONS  (STANDING):  dexmedetomidine Infusion 0.2 MICROgram(s)/kG/Hr (3.72 mL/Hr) IV Continuous <Continuous>  dextrose 5% + sodium chloride 0.45%. 1000 milliLiter(s) (80 mL/Hr) IV Continuous <Continuous>  enoxaparin Injectable 40 milliGRAM(s) SubCutaneous every 24 hours  folic acid 1 milliGRAM(s) Oral daily  multivitamin 1 Tablet(s) Oral daily  pantoprazole  Injectable 40 milliGRAM(s) IV Push every 12 hours  PHENobarbital Injectable 130 milliGRAM(s) IV Push every 6 hours  potassium chloride    Tablet ER 40 milliEquivalent(s) Oral daily  sucralfate 1 Gram(s) Oral four times a day    MEDICATIONS  (PRN):  acetaminophen   Tablet .. 650 milliGRAM(s) Oral every 6 hours PRN Mild Pain (1 - 3)  ondansetron Injectable 4 milliGRAM(s) IV Push every 6 hours PRN Nausea and/or Vomiting  PHENobarbital Injectable 130 milliGRAM(s) IV Push every 1 hour PRN CIWA >8

## 2019-04-04 NOTE — CHART NOTE - NSCHARTNOTEFT_GEN_A_CORE
Upon Nutritional Assessment by the Registered Dietitian your patient was determined to meet criteria / has evidence of the following diagnosis/diagnoses:          [ ]  Mild Protein Calorie Malnutrition        [X]  Moderate Protein Calorie Malnutrition        [ ] Severe Protein Calorie Malnutrition        [ ] Unspecified Protein Calorie Malnutrition        [ ] Underweight / BMI <19        [ ] Morbid Obesity / BMI > 40      Findings as based on:  •  Comprehensive nutrition assessment and consultation  •  Calorie counts (nutrient intake analysis)  •  Food acceptance and intake status from observations by staff  •  Follow up  •  Patient education  •  Intervention secondary to interdisciplinary rounds  •   concerns      Treatment:    The following diet has been recommended:    If pt remains sedated and po intake does not improve, consider Vital AF 1.2 @ 75 mL/hr 24 hr continuous feed (provides 1800 mL, 2200 calories, 135 g protein, 1458 mL water)    PROVIDER Section:     By signing this assessment you are acknowledging and agree with the diagnosis/diagnoses assigned by the Registered Dietitian    Comments:

## 2019-04-04 NOTE — DIETITIAN INITIAL EVALUATION ADULT. - ETIOLOGY
in inadequate energy and protein intake related to alcohol abuse and withdrawal inadequate energy and protein intake related to alcohol abuse

## 2019-04-04 NOTE — DIETITIAN INITIAL EVALUATION ADULT. - NS AS NUTRI DX NUTRIENT
Malnutrition/Moderate malnutrition in the context of chronic illness Malnutrition/Moderate malnutrition in the context of acute illness

## 2019-04-04 NOTE — DIETITIAN INITIAL EVALUATION ADULT. - OTHER INFO
Pt seen for ICU admission.  Pt sedated unable to obtain weight and diet hx.  As per RD assessment 11-18-18 pt lives with wife and children; consumes roti, hassan, chicken and rice and 20 oz vodka x3/week.  On current admission, pt consuming 1 pt vodka per day.  Pt consuming </50% of full liquid diet due to sedation.

## 2019-04-04 NOTE — DIETITIAN INITIAL EVALUATION ADULT. - NS AS NUTRI INTERV ENTERAL NUTRITION
Concentration/Rate/Volume/Composition/Schedule/Vital AF 1.2 @ 60 mL/hr, 24 hr continuous feeding (providing 1500 mL, 1800 kcal, 112 g protein, 1215 mL water) Concentration/Rate/Volume/Composition/Schedule/Vital AF 1.2 @ 75 mL/hr, 24 hr continuous feeding (providing 1500 mL, 1800 kcal, 112 g protein, 1215 mL water) Composition/Vital AF 1.2 @ 75 mL/hr, 24 hr continuous feeding (providing 1800 mL, 2200 kcal, 135 g protein, 1458 mL water)/Schedule/Concentration/Rate/Volume

## 2019-04-04 NOTE — DIETITIAN INITIAL EVALUATION ADULT. - FACTORS AFF FOOD INTAKE
other (specify)/sedated/Scientologist/ethnic/cultural/personal food preferences other (specify)/sedated

## 2019-04-04 NOTE — DIETITIAN INITIAL EVALUATION ADULT. - PERTINENT LABORATORY DATA
04-04 Na140 mmol/L Glu 105 mg/dL<H> K+ 4.2 mmol/L Cr  0.75 mg/dL BUN 7 mg/dL 04-04 Phos 3.8 mg/dL 04-03 Alb 3.0 g/dL<L>

## 2019-04-04 NOTE — DIETITIAN INITIAL EVALUATION ADULT. - PHYSICAL APPEARANCE
BMI 25.6; 2+ generalized edema; Nutrition focused physical exam conducted:  Subcutaneous fat loss: [ ] Orbital fat pads region, [ ]Buccal fat region, [ ]Triceps region,  [ ]Ribs region.  Muscle wasting: [ ]Temples region, [ ]Clavicle region, [ ]Shoulder region, [ ]Scapula region, [ ]Interosseous region,  [ ]thigh region, [ ]Calf region/well nourished BMI 25.6; 2+ generalized edema; Nutrition focused physical exam conducted:  Subcutaneous fat loss: [WDL ] Orbital fat pads region, [WDL ]Buccal fat region, [WDL ]Triceps region,  [mild ]Ribs region.  Muscle wasting: [WDL ]Temples region, [WDL ]Clavicle region, [mild ]Shoulder region, [unable ]Scapula region, [edematous ]Interosseous region,  [mild ]thigh region, [mild]Calf region/well nourished

## 2019-04-04 NOTE — DIETITIAN INITIAL EVALUATION ADULT. - ENERGY NEEDS
Height (cm): 170.18 (03-31)  Weight (kg): 74.4 kg (04-01)  BMI (kg/m2): 25.6 (04-02)  IBW: 67.1 kg         % IBW:  110%           UBW:  77 kg (01-15-19)            %UBW: 96%

## 2019-04-05 LAB
ALBUMIN SERPL ELPH-MCNC: 2.8 G/DL — LOW (ref 3.3–5)
ALP SERPL-CCNC: 46 U/L — SIGNIFICANT CHANGE UP (ref 40–120)
ALT FLD-CCNC: 27 U/L — SIGNIFICANT CHANGE UP (ref 12–78)
ANION GAP SERPL CALC-SCNC: 9 MMOL/L — SIGNIFICANT CHANGE UP (ref 5–17)
APPEARANCE UR: CLEAR — SIGNIFICANT CHANGE UP
AST SERPL-CCNC: 19 U/L — SIGNIFICANT CHANGE UP (ref 15–37)
BACTERIA # UR AUTO: ABNORMAL
BILIRUB SERPL-MCNC: 0.4 MG/DL — SIGNIFICANT CHANGE UP (ref 0.2–1.2)
BILIRUB UR-MCNC: NEGATIVE — SIGNIFICANT CHANGE UP
BUN SERPL-MCNC: 5 MG/DL — LOW (ref 7–23)
CALCIUM SERPL-MCNC: 8.5 MG/DL — SIGNIFICANT CHANGE UP (ref 8.5–10.1)
CHLORIDE SERPL-SCNC: 107 MMOL/L — SIGNIFICANT CHANGE UP (ref 96–108)
CO2 SERPL-SCNC: 23 MMOL/L — SIGNIFICANT CHANGE UP (ref 22–31)
COLOR SPEC: YELLOW — SIGNIFICANT CHANGE UP
CREAT SERPL-MCNC: 0.64 MG/DL — SIGNIFICANT CHANGE UP (ref 0.5–1.3)
DIFF PNL FLD: ABNORMAL
EPI CELLS # UR: SIGNIFICANT CHANGE UP
GLUCOSE SERPL-MCNC: 106 MG/DL — HIGH (ref 70–99)
GLUCOSE UR QL: NEGATIVE MG/DL — SIGNIFICANT CHANGE UP
HCT VFR BLD CALC: 40.2 % — SIGNIFICANT CHANGE UP (ref 39–50)
HGB BLD-MCNC: 13.1 G/DL — SIGNIFICANT CHANGE UP (ref 13–17)
KETONES UR-MCNC: NEGATIVE — SIGNIFICANT CHANGE UP
LEUKOCYTE ESTERASE UR-ACNC: ABNORMAL
MAGNESIUM SERPL-MCNC: 2.2 MG/DL — SIGNIFICANT CHANGE UP (ref 1.6–2.6)
MCHC RBC-ENTMCNC: 27.8 PG — SIGNIFICANT CHANGE UP (ref 27–34)
MCHC RBC-ENTMCNC: 32.6 GM/DL — SIGNIFICANT CHANGE UP (ref 32–36)
MCV RBC AUTO: 85.2 FL — SIGNIFICANT CHANGE UP (ref 80–100)
NITRITE UR-MCNC: NEGATIVE — SIGNIFICANT CHANGE UP
NRBC # BLD: 0 /100 WBCS — SIGNIFICANT CHANGE UP (ref 0–0)
PH UR: 7 — SIGNIFICANT CHANGE UP (ref 5–8)
PHOSPHATE SERPL-MCNC: 2.6 MG/DL — SIGNIFICANT CHANGE UP (ref 2.5–4.5)
PLATELET # BLD AUTO: 171 K/UL — SIGNIFICANT CHANGE UP (ref 150–400)
POTASSIUM SERPL-MCNC: 3.3 MMOL/L — LOW (ref 3.5–5.3)
POTASSIUM SERPL-SCNC: 3.3 MMOL/L — LOW (ref 3.5–5.3)
PROT SERPL-MCNC: 7.1 GM/DL — SIGNIFICANT CHANGE UP (ref 6–8.3)
PROT UR-MCNC: NEGATIVE MG/DL — SIGNIFICANT CHANGE UP
RBC # BLD: 4.72 M/UL — SIGNIFICANT CHANGE UP (ref 4.2–5.8)
RBC # FLD: 12.2 % — SIGNIFICANT CHANGE UP (ref 10.3–14.5)
RBC CASTS # UR COMP ASSIST: SIGNIFICANT CHANGE UP /HPF (ref 0–4)
SODIUM SERPL-SCNC: 139 MMOL/L — SIGNIFICANT CHANGE UP (ref 135–145)
SP GR SPEC: 1 — LOW (ref 1.01–1.02)
UROBILINOGEN FLD QL: NEGATIVE MG/DL — SIGNIFICANT CHANGE UP
WBC # BLD: 5.03 K/UL — SIGNIFICANT CHANGE UP (ref 3.8–10.5)
WBC # FLD AUTO: 5.03 K/UL — SIGNIFICANT CHANGE UP (ref 3.8–10.5)
WBC UR QL: SIGNIFICANT CHANGE UP

## 2019-04-05 PROCEDURE — 71045 X-RAY EXAM CHEST 1 VIEW: CPT | Mod: 26

## 2019-04-05 PROCEDURE — 99291 CRITICAL CARE FIRST HOUR: CPT

## 2019-04-05 RX ORDER — ACETAMINOPHEN 500 MG
650 TABLET ORAL EVERY 6 HOURS
Qty: 0 | Refills: 0 | Status: DISCONTINUED | OUTPATIENT
Start: 2019-04-05 | End: 2019-04-13

## 2019-04-05 RX ORDER — ACETAMINOPHEN 500 MG
650 TABLET ORAL EVERY 6 HOURS
Qty: 0 | Refills: 0 | Status: DISCONTINUED | OUTPATIENT
Start: 2019-04-05 | End: 2019-04-05

## 2019-04-05 RX ORDER — POTASSIUM CHLORIDE 20 MEQ
40 PACKET (EA) ORAL DAILY
Qty: 0 | Refills: 0 | Status: DISCONTINUED | OUTPATIENT
Start: 2019-04-05 | End: 2019-04-12

## 2019-04-05 RX ORDER — SODIUM CHLORIDE 9 MG/ML
1000 INJECTION INTRAMUSCULAR; INTRAVENOUS; SUBCUTANEOUS ONCE
Qty: 0 | Refills: 0 | Status: COMPLETED | OUTPATIENT
Start: 2019-04-05 | End: 2019-04-05

## 2019-04-05 RX ADMIN — Medication 130 MILLIGRAM(S): at 05:07

## 2019-04-05 RX ADMIN — PANTOPRAZOLE SODIUM 40 MILLIGRAM(S): 20 TABLET, DELAYED RELEASE ORAL at 17:51

## 2019-04-05 RX ADMIN — Medication 130 MILLIGRAM(S): at 21:46

## 2019-04-05 RX ADMIN — Medication 1 GRAM(S): at 11:44

## 2019-04-05 RX ADMIN — Medication 650 MILLIGRAM(S): at 08:44

## 2019-04-05 RX ADMIN — Medication 130 MILLIGRAM(S): at 10:21

## 2019-04-05 RX ADMIN — SODIUM CHLORIDE 80 MILLILITER(S): 9 INJECTION, SOLUTION INTRAVENOUS at 05:06

## 2019-04-05 RX ADMIN — Medication 1 TABLET(S): at 11:44

## 2019-04-05 RX ADMIN — DEXMEDETOMIDINE HYDROCHLORIDE IN 0.9% SODIUM CHLORIDE 3.72 MICROGRAM(S)/KG/HR: 4 INJECTION INTRAVENOUS at 19:30

## 2019-04-05 RX ADMIN — Medication 1 GRAM(S): at 05:06

## 2019-04-05 RX ADMIN — SODIUM CHLORIDE 1000 MILLILITER(S): 9 INJECTION INTRAMUSCULAR; INTRAVENOUS; SUBCUTANEOUS at 13:38

## 2019-04-05 RX ADMIN — ENOXAPARIN SODIUM 40 MILLIGRAM(S): 100 INJECTION SUBCUTANEOUS at 20:42

## 2019-04-05 RX ADMIN — Medication 1 GRAM(S): at 17:52

## 2019-04-05 RX ADMIN — Medication 130 MILLIGRAM(S): at 23:48

## 2019-04-05 RX ADMIN — Medication 130 MILLIGRAM(S): at 17:51

## 2019-04-05 RX ADMIN — Medication 1 GRAM(S): at 23:47

## 2019-04-05 RX ADMIN — DEXMEDETOMIDINE HYDROCHLORIDE IN 0.9% SODIUM CHLORIDE 3.72 MICROGRAM(S)/KG/HR: 4 INJECTION INTRAVENOUS at 14:02

## 2019-04-05 RX ADMIN — Medication 4 MILLIGRAM(S): at 22:44

## 2019-04-05 RX ADMIN — DEXMEDETOMIDINE HYDROCHLORIDE IN 0.9% SODIUM CHLORIDE 3.72 MICROGRAM(S)/KG/HR: 4 INJECTION INTRAVENOUS at 00:56

## 2019-04-05 RX ADMIN — Medication 2 MILLIGRAM(S): at 07:03

## 2019-04-05 RX ADMIN — Medication 650 MILLIGRAM(S): at 10:19

## 2019-04-05 RX ADMIN — Medication 1 MILLIGRAM(S): at 11:45

## 2019-04-05 RX ADMIN — Medication 2 MILLIGRAM(S): at 00:35

## 2019-04-05 RX ADMIN — Medication 130 MILLIGRAM(S): at 06:10

## 2019-04-05 RX ADMIN — PANTOPRAZOLE SODIUM 40 MILLIGRAM(S): 20 TABLET, DELAYED RELEASE ORAL at 05:07

## 2019-04-05 RX ADMIN — Medication 40 MILLIEQUIVALENT(S): at 11:45

## 2019-04-05 NOTE — PROVIDER CONTACT NOTE (EICU) - BACKGROUND
called by Rn for temp 102.3, ordered tylenol, blood culture and UA. Dr Lofton is already aware of fever spike. He is removing the saxena.

## 2019-04-06 DIAGNOSIS — J69.0 PNEUMONITIS DUE TO INHALATION OF FOOD AND VOMIT: ICD-10-CM

## 2019-04-06 LAB
ALBUMIN SERPL ELPH-MCNC: 2.6 G/DL — LOW (ref 3.3–5)
ALP SERPL-CCNC: 39 U/L — LOW (ref 40–120)
ALT FLD-CCNC: 18 U/L — SIGNIFICANT CHANGE UP (ref 12–78)
ANION GAP SERPL CALC-SCNC: 8 MMOL/L — SIGNIFICANT CHANGE UP (ref 5–17)
AST SERPL-CCNC: 10 U/L — LOW (ref 15–37)
BILIRUB SERPL-MCNC: 0.2 MG/DL — SIGNIFICANT CHANGE UP (ref 0.2–1.2)
BUN SERPL-MCNC: 6 MG/DL — LOW (ref 7–23)
CALCIUM SERPL-MCNC: 8.2 MG/DL — LOW (ref 8.5–10.1)
CHLORIDE SERPL-SCNC: 110 MMOL/L — HIGH (ref 96–108)
CO2 SERPL-SCNC: 22 MMOL/L — SIGNIFICANT CHANGE UP (ref 22–31)
CREAT SERPL-MCNC: 0.66 MG/DL — SIGNIFICANT CHANGE UP (ref 0.5–1.3)
GLUCOSE SERPL-MCNC: 109 MG/DL — HIGH (ref 70–99)
HCT VFR BLD CALC: 37.7 % — LOW (ref 39–50)
HGB BLD-MCNC: 12.3 G/DL — LOW (ref 13–17)
MAGNESIUM SERPL-MCNC: 2 MG/DL — SIGNIFICANT CHANGE UP (ref 1.6–2.6)
MCHC RBC-ENTMCNC: 27.8 PG — SIGNIFICANT CHANGE UP (ref 27–34)
MCHC RBC-ENTMCNC: 32.6 GM/DL — SIGNIFICANT CHANGE UP (ref 32–36)
MCV RBC AUTO: 85.3 FL — SIGNIFICANT CHANGE UP (ref 80–100)
NRBC # BLD: 0 /100 WBCS — SIGNIFICANT CHANGE UP (ref 0–0)
PHOSPHATE SERPL-MCNC: 2.4 MG/DL — LOW (ref 2.5–4.5)
PLATELET # BLD AUTO: 195 K/UL — SIGNIFICANT CHANGE UP (ref 150–400)
POTASSIUM SERPL-MCNC: 3.5 MMOL/L — SIGNIFICANT CHANGE UP (ref 3.5–5.3)
POTASSIUM SERPL-SCNC: 3.5 MMOL/L — SIGNIFICANT CHANGE UP (ref 3.5–5.3)
PROT SERPL-MCNC: 6.7 GM/DL — SIGNIFICANT CHANGE UP (ref 6–8.3)
RBC # BLD: 4.42 M/UL — SIGNIFICANT CHANGE UP (ref 4.2–5.8)
RBC # FLD: 12.5 % — SIGNIFICANT CHANGE UP (ref 10.3–14.5)
SODIUM SERPL-SCNC: 140 MMOL/L — SIGNIFICANT CHANGE UP (ref 135–145)
WBC # BLD: 4.29 K/UL — SIGNIFICANT CHANGE UP (ref 3.8–10.5)
WBC # FLD AUTO: 4.29 K/UL — SIGNIFICANT CHANGE UP (ref 3.8–10.5)

## 2019-04-06 PROCEDURE — 99291 CRITICAL CARE FIRST HOUR: CPT

## 2019-04-06 RX ORDER — POTASSIUM PHOSPHATE, MONOBASIC POTASSIUM PHOSPHATE, DIBASIC 236; 224 MG/ML; MG/ML
15 INJECTION, SOLUTION INTRAVENOUS ONCE
Qty: 0 | Refills: 0 | Status: COMPLETED | OUTPATIENT
Start: 2019-04-06 | End: 2019-04-06

## 2019-04-06 RX ORDER — AMPICILLIN SODIUM AND SULBACTAM SODIUM 250; 125 MG/ML; MG/ML
3 INJECTION, POWDER, FOR SUSPENSION INTRAMUSCULAR; INTRAVENOUS ONCE
Qty: 0 | Refills: 0 | Status: COMPLETED | OUTPATIENT
Start: 2019-04-06 | End: 2019-04-06

## 2019-04-06 RX ORDER — AMPICILLIN SODIUM AND SULBACTAM SODIUM 250; 125 MG/ML; MG/ML
INJECTION, POWDER, FOR SUSPENSION INTRAMUSCULAR; INTRAVENOUS
Qty: 0 | Refills: 0 | Status: DISCONTINUED | OUTPATIENT
Start: 2019-04-06 | End: 2019-04-06

## 2019-04-06 RX ORDER — AMPICILLIN SODIUM AND SULBACTAM SODIUM 250; 125 MG/ML; MG/ML
3 INJECTION, POWDER, FOR SUSPENSION INTRAMUSCULAR; INTRAVENOUS EVERY 6 HOURS
Qty: 0 | Refills: 0 | Status: DISCONTINUED | OUTPATIENT
Start: 2019-04-06 | End: 2019-04-06

## 2019-04-06 RX ORDER — AMPICILLIN SODIUM AND SULBACTAM SODIUM 250; 125 MG/ML; MG/ML
1.5 INJECTION, POWDER, FOR SUSPENSION INTRAMUSCULAR; INTRAVENOUS EVERY 6 HOURS
Qty: 0 | Refills: 0 | Status: DISCONTINUED | OUTPATIENT
Start: 2019-04-06 | End: 2019-04-06

## 2019-04-06 RX ORDER — AMPICILLIN SODIUM AND SULBACTAM SODIUM 250; 125 MG/ML; MG/ML
3 INJECTION, POWDER, FOR SUSPENSION INTRAMUSCULAR; INTRAVENOUS EVERY 6 HOURS
Qty: 0 | Refills: 0 | Status: DISCONTINUED | OUTPATIENT
Start: 2019-04-06 | End: 2019-04-13

## 2019-04-06 RX ADMIN — DEXMEDETOMIDINE HYDROCHLORIDE IN 0.9% SODIUM CHLORIDE 3.72 MICROGRAM(S)/KG/HR: 4 INJECTION INTRAVENOUS at 06:22

## 2019-04-06 RX ADMIN — Medication 1 GRAM(S): at 17:14

## 2019-04-06 RX ADMIN — Medication 650 MILLIGRAM(S): at 13:00

## 2019-04-06 RX ADMIN — Medication 4 MILLIGRAM(S): at 22:18

## 2019-04-06 RX ADMIN — SODIUM CHLORIDE 80 MILLILITER(S): 9 INJECTION, SOLUTION INTRAVENOUS at 00:12

## 2019-04-06 RX ADMIN — ENOXAPARIN SODIUM 40 MILLIGRAM(S): 100 INJECTION SUBCUTANEOUS at 21:08

## 2019-04-06 RX ADMIN — Medication 1 GRAM(S): at 11:32

## 2019-04-06 RX ADMIN — Medication 4 MILLIGRAM(S): at 08:25

## 2019-04-06 RX ADMIN — PANTOPRAZOLE SODIUM 40 MILLIGRAM(S): 20 TABLET, DELAYED RELEASE ORAL at 17:14

## 2019-04-06 RX ADMIN — AMPICILLIN SODIUM AND SULBACTAM SODIUM 200 GRAM(S): 250; 125 INJECTION, POWDER, FOR SUSPENSION INTRAMUSCULAR; INTRAVENOUS at 14:14

## 2019-04-06 RX ADMIN — DEXMEDETOMIDINE HYDROCHLORIDE IN 0.9% SODIUM CHLORIDE 3.72 MICROGRAM(S)/KG/HR: 4 INJECTION INTRAVENOUS at 16:00

## 2019-04-06 RX ADMIN — SODIUM CHLORIDE 80 MILLILITER(S): 9 INJECTION, SOLUTION INTRAVENOUS at 22:41

## 2019-04-06 RX ADMIN — SODIUM CHLORIDE 80 MILLILITER(S): 9 INJECTION, SOLUTION INTRAVENOUS at 12:37

## 2019-04-06 RX ADMIN — Medication 40 MILLIEQUIVALENT(S): at 11:31

## 2019-04-06 RX ADMIN — DEXMEDETOMIDINE HYDROCHLORIDE IN 0.9% SODIUM CHLORIDE 3.72 MICROGRAM(S)/KG/HR: 4 INJECTION INTRAVENOUS at 22:40

## 2019-04-06 RX ADMIN — Medication 1 TABLET(S): at 11:31

## 2019-04-06 RX ADMIN — DEXMEDETOMIDINE HYDROCHLORIDE IN 0.9% SODIUM CHLORIDE 3.72 MICROGRAM(S)/KG/HR: 4 INJECTION INTRAVENOUS at 03:34

## 2019-04-06 RX ADMIN — Medication 650 MILLIGRAM(S): at 00:12

## 2019-04-06 RX ADMIN — Medication 1 MILLIGRAM(S): at 11:31

## 2019-04-06 RX ADMIN — Medication 650 MILLIGRAM(S): at 12:36

## 2019-04-06 RX ADMIN — Medication 130 MILLIGRAM(S): at 02:56

## 2019-04-06 RX ADMIN — Medication 4 MILLIGRAM(S): at 14:15

## 2019-04-06 RX ADMIN — DEXMEDETOMIDINE HYDROCHLORIDE IN 0.9% SODIUM CHLORIDE 3.72 MICROGRAM(S)/KG/HR: 4 INJECTION INTRAVENOUS at 09:28

## 2019-04-06 RX ADMIN — POTASSIUM PHOSPHATE, MONOBASIC POTASSIUM PHOSPHATE, DIBASIC 62.5 MILLIMOLE(S): 236; 224 INJECTION, SOLUTION INTRAVENOUS at 06:37

## 2019-04-06 RX ADMIN — AMPICILLIN SODIUM AND SULBACTAM SODIUM 200 GRAM(S): 250; 125 INJECTION, POWDER, FOR SUSPENSION INTRAMUSCULAR; INTRAVENOUS at 19:44

## 2019-04-06 RX ADMIN — PANTOPRAZOLE SODIUM 40 MILLIGRAM(S): 20 TABLET, DELAYED RELEASE ORAL at 05:23

## 2019-04-06 RX ADMIN — Medication 130 MILLIGRAM(S): at 05:23

## 2019-04-06 RX ADMIN — Medication 1 GRAM(S): at 05:23

## 2019-04-06 RX ADMIN — Medication 130 MILLIGRAM(S): at 19:50

## 2019-04-06 NOTE — PROGRESS NOTE ADULT - PROBLEM SELECTOR PLAN 1
c/w precedex, around the clock and PRN ativan  Is more calm with current regmine. Awake and protecting airway.

## 2019-04-07 DIAGNOSIS — F10.29 ALCOHOL DEPENDENCE WITH UNSPECIFIED ALCOHOL-INDUCED DISORDER: ICD-10-CM

## 2019-04-07 LAB
ANION GAP SERPL CALC-SCNC: 9 MMOL/L — SIGNIFICANT CHANGE UP (ref 5–17)
BUN SERPL-MCNC: 3 MG/DL — LOW (ref 7–23)
CALCIUM SERPL-MCNC: 8.4 MG/DL — LOW (ref 8.5–10.1)
CHLORIDE SERPL-SCNC: 106 MMOL/L — SIGNIFICANT CHANGE UP (ref 96–108)
CO2 SERPL-SCNC: 22 MMOL/L — SIGNIFICANT CHANGE UP (ref 22–31)
CREAT SERPL-MCNC: 0.77 MG/DL — SIGNIFICANT CHANGE UP (ref 0.5–1.3)
GLUCOSE SERPL-MCNC: 96 MG/DL — SIGNIFICANT CHANGE UP (ref 70–99)
MAGNESIUM SERPL-MCNC: 1.9 MG/DL — SIGNIFICANT CHANGE UP (ref 1.6–2.6)
PHOSPHATE SERPL-MCNC: 2 MG/DL — LOW (ref 2.5–4.5)
POTASSIUM SERPL-MCNC: 3.1 MMOL/L — LOW (ref 3.5–5.3)
POTASSIUM SERPL-SCNC: 3.1 MMOL/L — LOW (ref 3.5–5.3)
SODIUM SERPL-SCNC: 137 MMOL/L — SIGNIFICANT CHANGE UP (ref 135–145)

## 2019-04-07 PROCEDURE — 71045 X-RAY EXAM CHEST 1 VIEW: CPT | Mod: 26

## 2019-04-07 PROCEDURE — 99291 CRITICAL CARE FIRST HOUR: CPT

## 2019-04-07 RX ORDER — POTASSIUM PHOSPHATE, MONOBASIC POTASSIUM PHOSPHATE, DIBASIC 236; 224 MG/ML; MG/ML
15 INJECTION, SOLUTION INTRAVENOUS ONCE
Qty: 0 | Refills: 0 | Status: COMPLETED | OUTPATIENT
Start: 2019-04-07 | End: 2019-04-07

## 2019-04-07 RX ORDER — POTASSIUM CHLORIDE 20 MEQ
10 PACKET (EA) ORAL
Qty: 0 | Refills: 0 | Status: COMPLETED | OUTPATIENT
Start: 2019-04-07 | End: 2019-04-07

## 2019-04-07 RX ADMIN — Medication 40 MILLIEQUIVALENT(S): at 11:10

## 2019-04-07 RX ADMIN — POTASSIUM PHOSPHATE, MONOBASIC POTASSIUM PHOSPHATE, DIBASIC 62.5 MILLIMOLE(S): 236; 224 INJECTION, SOLUTION INTRAVENOUS at 07:25

## 2019-04-07 RX ADMIN — Medication 3 MILLIGRAM(S): at 13:59

## 2019-04-07 RX ADMIN — Medication 1 GRAM(S): at 11:10

## 2019-04-07 RX ADMIN — Medication 1 GRAM(S): at 05:42

## 2019-04-07 RX ADMIN — PANTOPRAZOLE SODIUM 40 MILLIGRAM(S): 20 TABLET, DELAYED RELEASE ORAL at 17:41

## 2019-04-07 RX ADMIN — DEXMEDETOMIDINE HYDROCHLORIDE IN 0.9% SODIUM CHLORIDE 3.72 MICROGRAM(S)/KG/HR: 4 INJECTION INTRAVENOUS at 10:59

## 2019-04-07 RX ADMIN — Medication 100 MILLIEQUIVALENT(S): at 09:45

## 2019-04-07 RX ADMIN — PANTOPRAZOLE SODIUM 40 MILLIGRAM(S): 20 TABLET, DELAYED RELEASE ORAL at 05:41

## 2019-04-07 RX ADMIN — AMPICILLIN SODIUM AND SULBACTAM SODIUM 200 GRAM(S): 250; 125 INJECTION, POWDER, FOR SUSPENSION INTRAMUSCULAR; INTRAVENOUS at 20:26

## 2019-04-07 RX ADMIN — Medication 4 MILLIGRAM(S): at 01:48

## 2019-04-07 RX ADMIN — DEXMEDETOMIDINE HYDROCHLORIDE IN 0.9% SODIUM CHLORIDE 3.72 MICROGRAM(S)/KG/HR: 4 INJECTION INTRAVENOUS at 17:42

## 2019-04-07 RX ADMIN — Medication 3 MILLIGRAM(S): at 21:37

## 2019-04-07 RX ADMIN — Medication 650 MILLIGRAM(S): at 10:00

## 2019-04-07 RX ADMIN — AMPICILLIN SODIUM AND SULBACTAM SODIUM 200 GRAM(S): 250; 125 INJECTION, POWDER, FOR SUSPENSION INTRAMUSCULAR; INTRAVENOUS at 14:00

## 2019-04-07 RX ADMIN — Medication 650 MILLIGRAM(S): at 17:20

## 2019-04-07 RX ADMIN — Medication 100 MILLIEQUIVALENT(S): at 07:25

## 2019-04-07 RX ADMIN — Medication 3 MILLIGRAM(S): at 10:00

## 2019-04-07 RX ADMIN — Medication 1 GRAM(S): at 17:41

## 2019-04-07 RX ADMIN — ENOXAPARIN SODIUM 40 MILLIGRAM(S): 100 INJECTION SUBCUTANEOUS at 20:27

## 2019-04-07 RX ADMIN — Medication 1 TABLET(S): at 11:10

## 2019-04-07 RX ADMIN — Medication 1 MILLIGRAM(S): at 11:10

## 2019-04-07 RX ADMIN — SODIUM CHLORIDE 80 MILLILITER(S): 9 INJECTION, SOLUTION INTRAVENOUS at 21:24

## 2019-04-07 RX ADMIN — Medication 650 MILLIGRAM(S): at 03:14

## 2019-04-07 RX ADMIN — AMPICILLIN SODIUM AND SULBACTAM SODIUM 200 GRAM(S): 250; 125 INJECTION, POWDER, FOR SUSPENSION INTRAMUSCULAR; INTRAVENOUS at 08:45

## 2019-04-07 RX ADMIN — Medication 3 MILLIGRAM(S): at 17:41

## 2019-04-07 RX ADMIN — AMPICILLIN SODIUM AND SULBACTAM SODIUM 200 GRAM(S): 250; 125 INJECTION, POWDER, FOR SUSPENSION INTRAMUSCULAR; INTRAVENOUS at 01:47

## 2019-04-07 RX ADMIN — Medication 650 MILLIGRAM(S): at 16:20

## 2019-04-07 RX ADMIN — Medication 650 MILLIGRAM(S): at 11:00

## 2019-04-07 RX ADMIN — Medication 100 MILLIEQUIVALENT(S): at 11:09

## 2019-04-07 RX ADMIN — Medication 1 GRAM(S): at 01:47

## 2019-04-08 DIAGNOSIS — F10.231 ALCOHOL DEPENDENCE WITH WITHDRAWAL DELIRIUM: ICD-10-CM

## 2019-04-08 LAB
ANION GAP SERPL CALC-SCNC: 10 MMOL/L — SIGNIFICANT CHANGE UP (ref 5–17)
BUN SERPL-MCNC: 5 MG/DL — LOW (ref 7–23)
CALCIUM SERPL-MCNC: 8 MG/DL — LOW (ref 8.5–10.1)
CHLORIDE SERPL-SCNC: 106 MMOL/L — SIGNIFICANT CHANGE UP (ref 96–108)
CO2 SERPL-SCNC: 24 MMOL/L — SIGNIFICANT CHANGE UP (ref 22–31)
CREAT SERPL-MCNC: 0.7 MG/DL — SIGNIFICANT CHANGE UP (ref 0.5–1.3)
GLUCOSE SERPL-MCNC: 95 MG/DL — SIGNIFICANT CHANGE UP (ref 70–99)
HCT VFR BLD CALC: 35.2 % — LOW (ref 39–50)
HGB BLD-MCNC: 11.5 G/DL — LOW (ref 13–17)
MAGNESIUM SERPL-MCNC: 1.7 MG/DL — SIGNIFICANT CHANGE UP (ref 1.6–2.6)
MCHC RBC-ENTMCNC: 27.9 PG — SIGNIFICANT CHANGE UP (ref 27–34)
MCHC RBC-ENTMCNC: 32.7 GM/DL — SIGNIFICANT CHANGE UP (ref 32–36)
MCV RBC AUTO: 85.4 FL — SIGNIFICANT CHANGE UP (ref 80–100)
NRBC # BLD: 0 /100 WBCS — SIGNIFICANT CHANGE UP (ref 0–0)
PHOSPHATE SERPL-MCNC: 3.1 MG/DL — SIGNIFICANT CHANGE UP (ref 2.5–4.5)
PLATELET # BLD AUTO: 266 K/UL — SIGNIFICANT CHANGE UP (ref 150–400)
POTASSIUM SERPL-MCNC: 3 MMOL/L — LOW (ref 3.5–5.3)
POTASSIUM SERPL-SCNC: 3 MMOL/L — LOW (ref 3.5–5.3)
RBC # BLD: 4.12 M/UL — LOW (ref 4.2–5.8)
RBC # FLD: 12.8 % — SIGNIFICANT CHANGE UP (ref 10.3–14.5)
SODIUM SERPL-SCNC: 140 MMOL/L — SIGNIFICANT CHANGE UP (ref 135–145)
WBC # BLD: 8.56 K/UL — SIGNIFICANT CHANGE UP (ref 3.8–10.5)
WBC # FLD AUTO: 8.56 K/UL — SIGNIFICANT CHANGE UP (ref 3.8–10.5)

## 2019-04-08 PROCEDURE — 99291 CRITICAL CARE FIRST HOUR: CPT

## 2019-04-08 RX ORDER — ROBINUL 0.2 MG/ML
1 INJECTION INTRAMUSCULAR; INTRAVENOUS ONCE
Qty: 0 | Refills: 0 | Status: COMPLETED | OUTPATIENT
Start: 2019-04-08 | End: 2019-04-08

## 2019-04-08 RX ORDER — POTASSIUM CHLORIDE 20 MEQ
40 PACKET (EA) ORAL ONCE
Qty: 0 | Refills: 0 | Status: COMPLETED | OUTPATIENT
Start: 2019-04-08 | End: 2019-04-08

## 2019-04-08 RX ADMIN — AMPICILLIN SODIUM AND SULBACTAM SODIUM 200 GRAM(S): 250; 125 INJECTION, POWDER, FOR SUSPENSION INTRAMUSCULAR; INTRAVENOUS at 14:43

## 2019-04-08 RX ADMIN — Medication 2 MILLIGRAM(S): at 14:56

## 2019-04-08 RX ADMIN — Medication 2 MILLIGRAM(S): at 18:26

## 2019-04-08 RX ADMIN — Medication 40 MILLIEQUIVALENT(S): at 08:31

## 2019-04-08 RX ADMIN — Medication 1 GRAM(S): at 12:13

## 2019-04-08 RX ADMIN — ENOXAPARIN SODIUM 40 MILLIGRAM(S): 100 INJECTION SUBCUTANEOUS at 21:08

## 2019-04-08 RX ADMIN — Medication 1 MILLIGRAM(S): at 12:13

## 2019-04-08 RX ADMIN — Medication 3 MILLIGRAM(S): at 01:30

## 2019-04-08 RX ADMIN — Medication 1 TABLET(S): at 12:13

## 2019-04-08 RX ADMIN — Medication 1 GRAM(S): at 18:26

## 2019-04-08 RX ADMIN — Medication 2 MILLIGRAM(S): at 06:01

## 2019-04-08 RX ADMIN — AMPICILLIN SODIUM AND SULBACTAM SODIUM 200 GRAM(S): 250; 125 INJECTION, POWDER, FOR SUSPENSION INTRAMUSCULAR; INTRAVENOUS at 08:31

## 2019-04-08 RX ADMIN — DEXMEDETOMIDINE HYDROCHLORIDE IN 0.9% SODIUM CHLORIDE 3.72 MICROGRAM(S)/KG/HR: 4 INJECTION INTRAVENOUS at 03:00

## 2019-04-08 RX ADMIN — AMPICILLIN SODIUM AND SULBACTAM SODIUM 200 GRAM(S): 250; 125 INJECTION, POWDER, FOR SUSPENSION INTRAMUSCULAR; INTRAVENOUS at 21:07

## 2019-04-08 RX ADMIN — AMPICILLIN SODIUM AND SULBACTAM SODIUM 200 GRAM(S): 250; 125 INJECTION, POWDER, FOR SUSPENSION INTRAMUSCULAR; INTRAVENOUS at 01:33

## 2019-04-08 RX ADMIN — ROBINUL 1 MILLIGRAM(S): 0.2 INJECTION INTRAMUSCULAR; INTRAVENOUS at 16:30

## 2019-04-08 RX ADMIN — SODIUM CHLORIDE 80 MILLILITER(S): 9 INJECTION, SOLUTION INTRAVENOUS at 21:15

## 2019-04-08 RX ADMIN — PANTOPRAZOLE SODIUM 40 MILLIGRAM(S): 20 TABLET, DELAYED RELEASE ORAL at 05:57

## 2019-04-08 RX ADMIN — Medication 2 MILLIGRAM(S): at 11:07

## 2019-04-08 RX ADMIN — Medication 40 MILLIEQUIVALENT(S): at 12:13

## 2019-04-08 RX ADMIN — Medication 650 MILLIGRAM(S): at 22:19

## 2019-04-08 RX ADMIN — PANTOPRAZOLE SODIUM 40 MILLIGRAM(S): 20 TABLET, DELAYED RELEASE ORAL at 18:27

## 2019-04-08 RX ADMIN — Medication 1 GRAM(S): at 06:00

## 2019-04-08 RX ADMIN — Medication 2 MILLIGRAM(S): at 21:13

## 2019-04-08 RX ADMIN — Medication 1 GRAM(S): at 00:10

## 2019-04-08 NOTE — PROGRESS NOTE ADULT - PROBLEM SELECTOR PLAN 1
still on precedex - trial to wean off  ativan taper- on 2 mg IV Q   C/W unasyn for tracheobronchitis, no infiltrates on CXR  lovenox for DVT PPX still on precedex - trial to wean off  ativan taper  C/W unasyn for tracheobronchitis, no infiltrates on CXR  lovenox for DVT PPX

## 2019-04-09 LAB
ANION GAP SERPL CALC-SCNC: 7 MMOL/L — SIGNIFICANT CHANGE UP (ref 5–17)
BUN SERPL-MCNC: 4 MG/DL — LOW (ref 7–23)
CALCIUM SERPL-MCNC: 8.6 MG/DL — SIGNIFICANT CHANGE UP (ref 8.5–10.1)
CHLORIDE SERPL-SCNC: 109 MMOL/L — HIGH (ref 96–108)
CO2 SERPL-SCNC: 26 MMOL/L — SIGNIFICANT CHANGE UP (ref 22–31)
CREAT SERPL-MCNC: 0.8 MG/DL — SIGNIFICANT CHANGE UP (ref 0.5–1.3)
GLUCOSE SERPL-MCNC: 107 MG/DL — HIGH (ref 70–99)
HCT VFR BLD CALC: 34.3 % — LOW (ref 39–50)
HGB BLD-MCNC: 11.2 G/DL — LOW (ref 13–17)
MAGNESIUM SERPL-MCNC: 2.1 MG/DL — SIGNIFICANT CHANGE UP (ref 1.6–2.6)
MCHC RBC-ENTMCNC: 27.9 PG — SIGNIFICANT CHANGE UP (ref 27–34)
MCHC RBC-ENTMCNC: 32.7 GM/DL — SIGNIFICANT CHANGE UP (ref 32–36)
MCV RBC AUTO: 85.3 FL — SIGNIFICANT CHANGE UP (ref 80–100)
NRBC # BLD: 0 /100 WBCS — SIGNIFICANT CHANGE UP (ref 0–0)
PHOSPHATE SERPL-MCNC: 2.9 MG/DL — SIGNIFICANT CHANGE UP (ref 2.5–4.5)
PLATELET # BLD AUTO: 328 K/UL — SIGNIFICANT CHANGE UP (ref 150–400)
POTASSIUM SERPL-MCNC: 3.4 MMOL/L — LOW (ref 3.5–5.3)
POTASSIUM SERPL-SCNC: 3.4 MMOL/L — LOW (ref 3.5–5.3)
RBC # BLD: 4.02 M/UL — LOW (ref 4.2–5.8)
RBC # FLD: 12.8 % — SIGNIFICANT CHANGE UP (ref 10.3–14.5)
SODIUM SERPL-SCNC: 142 MMOL/L — SIGNIFICANT CHANGE UP (ref 135–145)
WBC # BLD: 6.74 K/UL — SIGNIFICANT CHANGE UP (ref 3.8–10.5)
WBC # FLD AUTO: 6.74 K/UL — SIGNIFICANT CHANGE UP (ref 3.8–10.5)

## 2019-04-09 PROCEDURE — 99291 CRITICAL CARE FIRST HOUR: CPT

## 2019-04-09 RX ORDER — POTASSIUM CHLORIDE 20 MEQ
40 PACKET (EA) ORAL ONCE
Qty: 0 | Refills: 0 | Status: DISCONTINUED | OUTPATIENT
Start: 2019-04-09 | End: 2019-04-09

## 2019-04-09 RX ORDER — ROBINUL 0.2 MG/ML
1 INJECTION INTRAMUSCULAR; INTRAVENOUS DAILY
Qty: 0 | Refills: 0 | Status: DISCONTINUED | OUTPATIENT
Start: 2019-04-09 | End: 2019-04-09

## 2019-04-09 RX ORDER — ROBINUL 0.2 MG/ML
1 INJECTION INTRAMUSCULAR; INTRAVENOUS
Qty: 0 | Refills: 0 | Status: DISCONTINUED | OUTPATIENT
Start: 2019-04-10 | End: 2019-04-11

## 2019-04-09 RX ADMIN — Medication 1 GRAM(S): at 05:53

## 2019-04-09 RX ADMIN — AMPICILLIN SODIUM AND SULBACTAM SODIUM 200 GRAM(S): 250; 125 INJECTION, POWDER, FOR SUSPENSION INTRAMUSCULAR; INTRAVENOUS at 14:35

## 2019-04-09 RX ADMIN — Medication 1 GRAM(S): at 17:17

## 2019-04-09 RX ADMIN — AMPICILLIN SODIUM AND SULBACTAM SODIUM 200 GRAM(S): 250; 125 INJECTION, POWDER, FOR SUSPENSION INTRAMUSCULAR; INTRAVENOUS at 20:58

## 2019-04-09 RX ADMIN — PANTOPRAZOLE SODIUM 40 MILLIGRAM(S): 20 TABLET, DELAYED RELEASE ORAL at 17:16

## 2019-04-09 RX ADMIN — Medication 1 TABLET(S): at 12:35

## 2019-04-09 RX ADMIN — Medication 1 MILLIGRAM(S): at 12:34

## 2019-04-09 RX ADMIN — Medication 1 MILLIGRAM(S): at 10:22

## 2019-04-09 RX ADMIN — Medication 1 GRAM(S): at 01:04

## 2019-04-09 RX ADMIN — Medication 1 MILLIGRAM(S): at 22:22

## 2019-04-09 RX ADMIN — Medication 2 MILLIGRAM(S): at 01:03

## 2019-04-09 RX ADMIN — Medication 40 MILLIEQUIVALENT(S): at 12:35

## 2019-04-09 RX ADMIN — Medication 1 GRAM(S): at 23:39

## 2019-04-09 RX ADMIN — Medication 1 GRAM(S): at 12:36

## 2019-04-09 RX ADMIN — Medication 1 MILLIGRAM(S): at 17:16

## 2019-04-09 RX ADMIN — SODIUM CHLORIDE 80 MILLILITER(S): 9 INJECTION, SOLUTION INTRAVENOUS at 09:02

## 2019-04-09 RX ADMIN — SODIUM CHLORIDE 80 MILLILITER(S): 9 INJECTION, SOLUTION INTRAVENOUS at 22:10

## 2019-04-09 RX ADMIN — AMPICILLIN SODIUM AND SULBACTAM SODIUM 200 GRAM(S): 250; 125 INJECTION, POWDER, FOR SUSPENSION INTRAMUSCULAR; INTRAVENOUS at 09:00

## 2019-04-09 RX ADMIN — Medication 1 MILLIGRAM(S): at 14:35

## 2019-04-09 RX ADMIN — Medication 650 MILLIGRAM(S): at 00:00

## 2019-04-09 RX ADMIN — Medication 1 MILLIGRAM(S): at 05:53

## 2019-04-09 RX ADMIN — PANTOPRAZOLE SODIUM 40 MILLIGRAM(S): 20 TABLET, DELAYED RELEASE ORAL at 05:54

## 2019-04-09 RX ADMIN — ENOXAPARIN SODIUM 40 MILLIGRAM(S): 100 INJECTION SUBCUTANEOUS at 20:58

## 2019-04-09 RX ADMIN — AMPICILLIN SODIUM AND SULBACTAM SODIUM 200 GRAM(S): 250; 125 INJECTION, POWDER, FOR SUSPENSION INTRAMUSCULAR; INTRAVENOUS at 01:04

## 2019-04-09 NOTE — CHART NOTE - NSCHARTNOTEFT_GEN_A_CORE
Pt medically stable for transfer to medical floor.     53 year old of male with pmhx of alcohol abuse, p/w vomiting admitted to medicine floor for gastritis, code gray with CIWA of 21 after 3x 260mg of phenobarbitol and transferred to critical care for severe agitation with alcohol withdrawal.  Pt now changed over to ativan protocol and down to 1mg.  Pt started on unasyn for persistent fever likely aspiration.  Pt started on NGT feeds and tolerating well.  Robinul started for hypersecretions.  Pt off precedex since yesterday.

## 2019-04-10 DIAGNOSIS — J40 BRONCHITIS, NOT SPECIFIED AS ACUTE OR CHRONIC: ICD-10-CM

## 2019-04-10 LAB
ANION GAP SERPL CALC-SCNC: 7 MMOL/L — SIGNIFICANT CHANGE UP (ref 5–17)
BUN SERPL-MCNC: 4 MG/DL — LOW (ref 7–23)
CALCIUM SERPL-MCNC: 8.6 MG/DL — SIGNIFICANT CHANGE UP (ref 8.5–10.1)
CHLORIDE SERPL-SCNC: 106 MMOL/L — SIGNIFICANT CHANGE UP (ref 96–108)
CO2 SERPL-SCNC: 27 MMOL/L — SIGNIFICANT CHANGE UP (ref 22–31)
CREAT SERPL-MCNC: 0.74 MG/DL — SIGNIFICANT CHANGE UP (ref 0.5–1.3)
CULTURE RESULTS: SIGNIFICANT CHANGE UP
GLUCOSE SERPL-MCNC: 101 MG/DL — HIGH (ref 70–99)
HCT VFR BLD CALC: 35.5 % — LOW (ref 39–50)
HGB BLD-MCNC: 11.4 G/DL — LOW (ref 13–17)
MAGNESIUM SERPL-MCNC: 2.4 MG/DL — SIGNIFICANT CHANGE UP (ref 1.6–2.6)
MCHC RBC-ENTMCNC: 27.7 PG — SIGNIFICANT CHANGE UP (ref 27–34)
MCHC RBC-ENTMCNC: 32.1 GM/DL — SIGNIFICANT CHANGE UP (ref 32–36)
MCV RBC AUTO: 86.2 FL — SIGNIFICANT CHANGE UP (ref 80–100)
NRBC # BLD: 0 /100 WBCS — SIGNIFICANT CHANGE UP (ref 0–0)
PHOSPHATE SERPL-MCNC: 2.4 MG/DL — LOW (ref 2.5–4.5)
PLATELET # BLD AUTO: 410 K/UL — HIGH (ref 150–400)
POTASSIUM SERPL-MCNC: 3.3 MMOL/L — LOW (ref 3.5–5.3)
POTASSIUM SERPL-SCNC: 3.3 MMOL/L — LOW (ref 3.5–5.3)
RBC # BLD: 4.12 M/UL — LOW (ref 4.2–5.8)
RBC # FLD: 12.8 % — SIGNIFICANT CHANGE UP (ref 10.3–14.5)
SODIUM SERPL-SCNC: 140 MMOL/L — SIGNIFICANT CHANGE UP (ref 135–145)
SPECIMEN SOURCE: SIGNIFICANT CHANGE UP
WBC # BLD: 5.75 K/UL — SIGNIFICANT CHANGE UP (ref 3.8–10.5)
WBC # FLD AUTO: 5.75 K/UL — SIGNIFICANT CHANGE UP (ref 3.8–10.5)

## 2019-04-10 RX ORDER — POTASSIUM CHLORIDE 20 MEQ
10 PACKET (EA) ORAL
Qty: 0 | Refills: 0 | Status: COMPLETED | OUTPATIENT
Start: 2019-04-10 | End: 2019-04-10

## 2019-04-10 RX ORDER — SODIUM,POTASSIUM PHOSPHATES 278-250MG
1 POWDER IN PACKET (EA) ORAL
Qty: 0 | Refills: 0 | Status: COMPLETED | OUTPATIENT
Start: 2019-04-10 | End: 2019-04-11

## 2019-04-10 RX ORDER — POTASSIUM CHLORIDE 20 MEQ
40 PACKET (EA) ORAL ONCE
Qty: 0 | Refills: 0 | Status: DISCONTINUED | OUTPATIENT
Start: 2019-04-10 | End: 2019-04-10

## 2019-04-10 RX ADMIN — Medication 1 GRAM(S): at 12:07

## 2019-04-10 RX ADMIN — ENOXAPARIN SODIUM 40 MILLIGRAM(S): 100 INJECTION SUBCUTANEOUS at 21:20

## 2019-04-10 RX ADMIN — Medication 1 GRAM(S): at 18:01

## 2019-04-10 RX ADMIN — Medication 650 MILLIGRAM(S): at 04:23

## 2019-04-10 RX ADMIN — Medication 2 MILLIGRAM(S): at 17:08

## 2019-04-10 RX ADMIN — Medication 1 MILLIGRAM(S): at 17:07

## 2019-04-10 RX ADMIN — Medication 1 TABLET(S): at 21:20

## 2019-04-10 RX ADMIN — Medication 1 MILLIGRAM(S): at 01:01

## 2019-04-10 RX ADMIN — ROBINUL 1 MILLIGRAM(S): 0.2 INJECTION INTRAMUSCULAR; INTRAVENOUS at 05:25

## 2019-04-10 RX ADMIN — AMPICILLIN SODIUM AND SULBACTAM SODIUM 200 GRAM(S): 250; 125 INJECTION, POWDER, FOR SUSPENSION INTRAMUSCULAR; INTRAVENOUS at 14:24

## 2019-04-10 RX ADMIN — PANTOPRAZOLE SODIUM 40 MILLIGRAM(S): 20 TABLET, DELAYED RELEASE ORAL at 05:25

## 2019-04-10 RX ADMIN — Medication 1 TABLET(S): at 12:07

## 2019-04-10 RX ADMIN — Medication 100 MILLIEQUIVALENT(S): at 08:36

## 2019-04-10 RX ADMIN — Medication 100 MILLIEQUIVALENT(S): at 06:31

## 2019-04-10 RX ADMIN — Medication 1 TABLET(S): at 10:12

## 2019-04-10 RX ADMIN — Medication 1 MILLIGRAM(S): at 12:07

## 2019-04-10 RX ADMIN — Medication 2 MILLIGRAM(S): at 09:01

## 2019-04-10 RX ADMIN — SODIUM CHLORIDE 80 MILLILITER(S): 9 INJECTION, SOLUTION INTRAVENOUS at 08:36

## 2019-04-10 RX ADMIN — AMPICILLIN SODIUM AND SULBACTAM SODIUM 200 GRAM(S): 250; 125 INJECTION, POWDER, FOR SUSPENSION INTRAMUSCULAR; INTRAVENOUS at 08:35

## 2019-04-10 RX ADMIN — Medication 1 TABLET(S): at 18:01

## 2019-04-10 RX ADMIN — Medication 1 GRAM(S): at 05:25

## 2019-04-10 RX ADMIN — Medication 650 MILLIGRAM(S): at 13:07

## 2019-04-10 RX ADMIN — Medication 650 MILLIGRAM(S): at 12:09

## 2019-04-10 RX ADMIN — PANTOPRAZOLE SODIUM 40 MILLIGRAM(S): 20 TABLET, DELAYED RELEASE ORAL at 18:01

## 2019-04-10 RX ADMIN — Medication 100 MILLIEQUIVALENT(S): at 05:25

## 2019-04-10 RX ADMIN — ROBINUL 1 MILLIGRAM(S): 0.2 INJECTION INTRAMUSCULAR; INTRAVENOUS at 18:01

## 2019-04-10 RX ADMIN — AMPICILLIN SODIUM AND SULBACTAM SODIUM 200 GRAM(S): 250; 125 INJECTION, POWDER, FOR SUSPENSION INTRAMUSCULAR; INTRAVENOUS at 02:46

## 2019-04-10 RX ADMIN — AMPICILLIN SODIUM AND SULBACTAM SODIUM 200 GRAM(S): 250; 125 INJECTION, POWDER, FOR SUSPENSION INTRAMUSCULAR; INTRAVENOUS at 20:05

## 2019-04-10 RX ADMIN — Medication 2 MILLIGRAM(S): at 22:18

## 2019-04-10 RX ADMIN — Medication 40 MILLIEQUIVALENT(S): at 12:07

## 2019-04-10 NOTE — CHART NOTE - NSCHARTNOTEFT_GEN_A_CORE
Assessment: Pt seen for moderate malnutrition follow up and TF.      Factors impacting intake: [ ] none [ ] nausea  [ ] vomiting [ ] diarrhea [ ] constipation  [ ]chewing problems [ ] swallowing issues  [ ] other:     Diet Prescription: Diet, NPO with Tube Feed:   Tube Feeding Modality: Nasogastric  Vital AF  Total Volume for 24 Hours (mL): 1320  Continuous  Starting Tube Feed Rate {mL per Hour}: 30  Increase Tube Feed Rate by (mL): 10     Every 6 hours  Until Goal Tube Feed Rate (mL per Hour): 55  Tube Feed Duration (in Hours): 24  Tube Feed Start Time: 16:00 (04-08-19 @ 15:31)    Intake:     Current Weight: 74.9 kg (04-10-19); 75.9 kg (04-05); 74.4 kg (04-01)   % Weight Change: Weight stable x 9 days     Pertinent Medications: MEDICATIONS  (STANDING):  ampicillin/sulbactam  IVPB 3 Gram(s) IV Intermittent every 6 hours  dextrose 5% + sodium chloride 0.45%. 1000 milliLiter(s) (80 mL/Hr) IV Continuous <Continuous>  enoxaparin Injectable 40 milliGRAM(s) SubCutaneous every 24 hours  folic acid 1 milliGRAM(s) Oral daily  glycopyrrolate 1 milliGRAM(s) Oral two times a day  LORazepam   Injectable   IV Push   LORazepam   Injectable 1 milliGRAM(s) IV Push every 12 hours  multivitamin 1 Tablet(s) Oral daily  pantoprazole  Injectable 40 milliGRAM(s) IV Push every 12 hours  PHENobarbital Injectable 260 milliGRAM(s) IV Push once  potassium acid phosphate/sodium acid phosphate tablet (K-PHOS No. 2) 1 Tablet(s) Oral four times a day with meals  potassium chloride   Powder 40 milliEquivalent(s) Oral daily  sucralfate suspension 1 Gram(s) Oral four times a day    MEDICATIONS  (PRN):  acetaminophen   Tablet .. 650 milliGRAM(s) Oral every 6 hours PRN Temp greater or equal to 38C (100.4F), Mild Pain (1 - 3)  LORazepam   Injectable 2 milliGRAM(s) IV Push every 4 hours PRN CIWA>8  ondansetron Injectable 4 milliGRAM(s) IV Push every 6 hours PRN Nausea and/or Vomiting    Pertinent Labs: 04-10 Na140 mmol/L Glu 101 mg/dL<H> K+ 3.3 mmol/L<L> Cr  0.74 mg/dL BUN 4 mg/dL<L> 04-10 Phos 2.4 mg/dL<L> 04-06 Alb 2.6 g/dL<L>    Skin: Intact     Estimated Needs:   [X] no change since previous assessment (04-04-19)  [ ] recalculated:     Previous Nutrition Diagnosis:   [ ] Inadequate Energy Intake [ ]Inadequate Oral Intake [ ] Excessive Energy Intake   [ ] Underweight [ ] Increased Nutrient Needs [ ] Overweight/Obesity   [ ] Altered GI Function [ ] Unintended Weight Loss [ ] Food & Nutrition Related Knowledge Deficit   [X] Malnutrition: Moderate malnutrition in the context of chronic illness    Nutrition Diagnosis is [ ] ongoing  [ ] resolved [ ] not applicable     Goal: Pt to obtain >/75% of protein and energy needs from enteral feedings.     New Nutrition Diagnosis: [ ] not applicable      Interventions:   Recommend  [ ] Continue   [ ] Change Diet To:  [ ] Nutrition Supplement  [ ] Nutrition Support  [ ] Other:     Monitoring and Evaluation:   [ ] PO intake [ x ] Tolerance to diet prescription [ x ] weights [ x ] labs[ x ] follow up per protocol  [ ] other: Assessment: Pt seen for moderate malnutrition follow up and TF.  Pt observed lethargic and unable to interview.    Factors impacting intake: [ ] none [ ] nausea  [ ] vomiting [ ] diarrhea [ ] constipation  [ ]chewing problems [ ] swallowing issues  [ ] other:     Diet Prescription: Diet, NPO with Tube Feed:   Tube Feeding Modality: Nasogastric  Vital AF  Total Volume for 24 Hours (mL): 1320  Continuous  Starting Tube Feed Rate {mL per Hour}: 30  Increase Tube Feed Rate by (mL): 10     Every 6 hours  Until Goal Tube Feed Rate (mL per Hour): 55  Tube Feed Duration (in Hours): 24  Tube Feed Start Time: 16:00 (04-08-19 @ 15:31)    Intake: Pt tolerating TF at goal rate 55 mL per hr, no residuals.  Pt receiving 1584 calories, 99 g protein, 1320 mL fluid, 1069 ml water on Vital AF @ 55 mL/hr x 24 hrs.     Current Weight: 74.9 kg (04-10-19); 75.9 kg (04-05); 74.4 kg (04-01)     % Weight Change: Weight stable x 9 days     Nutrition focused physical exam conducted:  Subcutaneous fat loss: [WDL] Orbital fat pads region, [WDL]Buccal fat region, [unable, edematous ]Triceps region,  [mild]Ribs region.  Muscle wasting: [mild]Temples region, [WDL ]Clavicle region, [mild ]Shoulder region, [unable]Scapula region, [unable-edematous]Interosseous region,  [moderate] thigh region, [mild] Calf region    Pertinent Medications: MEDICATIONS  (STANDING):  ampicillin/sulbactam  IVPB 3 Gram(s) IV Intermittent every 6 hours  dextrose 5% + sodium chloride 0.45%. 1000 milliLiter(s) (80 mL/Hr) IV Continuous <Continuous>  enoxaparin Injectable 40 milliGRAM(s) SubCutaneous every 24 hours  folic acid 1 milliGRAM(s) Oral daily  glycopyrrolate 1 milliGRAM(s) Oral two times a day  LORazepam   Injectable   IV Push   LORazepam   Injectable 1 milliGRAM(s) IV Push every 12 hours  multivitamin 1 Tablet(s) Oral daily  pantoprazole  Injectable 40 milliGRAM(s) IV Push every 12 hours  PHENobarbital Injectable 260 milliGRAM(s) IV Push once  potassium acid phosphate/sodium acid phosphate tablet (K-PHOS No. 2) 1 Tablet(s) Oral four times a day with meals  potassium chloride   Powder 40 milliEquivalent(s) Oral daily  sucralfate suspension 1 Gram(s) Oral four times a day    MEDICATIONS  (PRN):  acetaminophen   Tablet .. 650 milliGRAM(s) Oral every 6 hours PRN Temp greater or equal to 38C (100.4F), Mild Pain (1 - 3)  LORazepam   Injectable 2 milliGRAM(s) IV Push every 4 hours PRN CIWA>8  ondansetron Injectable 4 milliGRAM(s) IV Push every 6 hours PRN Nausea and/or Vomiting    Pertinent Labs: 04-10 Na140 mmol/L Glu 101 mg/dL<H> K+ 3.3 mmol/L<L> Cr  0.74 mg/dL BUN 4 mg/dL<L> 04-10 Phos 2.4 mg/dL<L> 04-06 Alb 2.6 g/dL<L>    Skin: Intact     Estimated Needs:   [X] no change since previous assessment (04-04-19)  [ ] recalculated:     Previous Nutrition Diagnosis:   [ ] Inadequate Energy Intake [ ]Inadequate Oral Intake [ ] Excessive Energy Intake   [ ] Underweight [ ] Increased Nutrient Needs [ ] Overweight/Obesity   [ ] Altered GI Function [ ] Unintended Weight Loss [ ] Food & Nutrition Related Knowledge Deficit   [X] Malnutrition: Moderate malnutrition in the context of chronic illness    Nutrition Diagnosis is [X] ongoing  [ ] resolved [ ] not applicable     Goal: Pt to obtain >/75% of protein and energy needs from enteral feedings. (Not met)     New Nutrition Diagnosis: [X] not applicable      Interventions:   Recommend  [X] Continue   [ ] Change Diet To:  [ ] Nutrition Supplement  [ ] Nutrition Support  [ ] Other:     Monitoring and Evaluation:   [X] PO intake [ x ] Tolerance to diet prescription [ x ] weights [ x ] labs[ x ] follow up per protocol  [ ] other: Assessment: Pt seen for moderate malnutrition follow up and TF.  Pt observed lethargic and unable to interview.    Factors impacting intake: [ ] none [ ] nausea  [ ] vomiting [ ] diarrhea [ ] constipation  [ ]chewing problems [ ] swallowing issues  [ ] other:     Diet Prescription: Diet, NPO with Tube Feed:   Tube Feeding Modality: Nasogastric  Vital AF  Total Volume for 24 Hours (mL): 1320  Continuous  Starting Tube Feed Rate {mL per Hour}: 30  Increase Tube Feed Rate by (mL): 10     Every 6 hours  Until Goal Tube Feed Rate (mL per Hour): 55  Tube Feed Duration (in Hours): 24  Tube Feed Start Time: 16:00 (04-08-19 @ 15:31)    Intake: Pt tolerating TF at goal rate 55 mL per hr, no residuals.  Pt receiving 1584 calories, 99 g protein, 1320 mL fluid, 1069 ml water on Vital AF @ 55 mL/hr x 24 hrs.     Current Weight: 74.9 kg (04-10-19); 75.9 kg (04-05); 74.4 kg (04-01)     % Weight Change: Weight stable x 9 days     Nutrition focused physical exam conducted:  Subcutaneous fat loss: [WDL] Orbital fat pads region, [WDL]Buccal fat region, [unable, edematous ]Triceps region,  [mild]Ribs region.  Muscle wasting: [mild]Temples region, [WDL ]Clavicle region, [mild ]Shoulder region, [unable]Scapula region, [unable-edematous]Interosseous region,  [moderate] thigh region, [mild] Calf region    Pertinent Medications: MEDICATIONS  (STANDING):  ampicillin/sulbactam  IVPB 3 Gram(s) IV Intermittent every 6 hours  dextrose 5% + sodium chloride 0.45%. 1000 milliLiter(s) (80 mL/Hr) IV Continuous <Continuous>  enoxaparin Injectable 40 milliGRAM(s) SubCutaneous every 24 hours  folic acid 1 milliGRAM(s) Oral daily  glycopyrrolate 1 milliGRAM(s) Oral two times a day  LORazepam   Injectable   IV Push   LORazepam   Injectable 1 milliGRAM(s) IV Push every 12 hours  multivitamin 1 Tablet(s) Oral daily  pantoprazole  Injectable 40 milliGRAM(s) IV Push every 12 hours  PHENobarbital Injectable 260 milliGRAM(s) IV Push once  potassium acid phosphate/sodium acid phosphate tablet (K-PHOS No. 2) 1 Tablet(s) Oral four times a day with meals  potassium chloride   Powder 40 milliEquivalent(s) Oral daily  sucralfate suspension 1 Gram(s) Oral four times a day    MEDICATIONS  (PRN):  acetaminophen   Tablet .. 650 milliGRAM(s) Oral every 6 hours PRN Temp greater or equal to 38C (100.4F), Mild Pain (1 - 3)  LORazepam   Injectable 2 milliGRAM(s) IV Push every 4 hours PRN CIWA>8  ondansetron Injectable 4 milliGRAM(s) IV Push every 6 hours PRN Nausea and/or Vomiting    Pertinent Labs: 04-10 Na140 mmol/L Glu 101 mg/dL<H> K+ 3.3 mmol/L<L> Cr  0.74 mg/dL BUN 4 mg/dL<L> 04-10 Phos 2.4 mg/dL<L> 04-06 Alb 2.6 g/dL<L>    Skin: Intact     Estimated Needs:   [X] no change since previous assessment (04-04-19)  [ ] recalculated:     Previous Nutrition Diagnosis:   [ ] Inadequate Energy Intake [ ]Inadequate Oral Intake [ ] Excessive Energy Intake   [ ] Underweight [ ] Increased Nutrient Needs [ ] Overweight/Obesity   [ ] Altered GI Function [ ] Unintended Weight Loss [ ] Food & Nutrition Related Knowledge Deficit   [X] Malnutrition: Moderate malnutrition in the context of chronic illness    Nutrition Diagnosis is [X] ongoing  [ ] resolved [ ] not applicable     Goal: Pt to obtain >/75% of protein and energy needs from enteral feedings. (Not met)     New Nutrition Diagnosis: [X] not applicable      Interventions:   Recommend  [ ] Continue   [ ] Change Diet To:  [ ] Nutrition Supplement  [X] Nutrition Support: Jevity 1.5 @ 55 mL/hr for 24 hrs continuous (provides 1342 mL, 2013 kcal, 86 g protein, 1020 mL)   [ ] Other:     Monitoring and Evaluation:   [X] PO intake [ x ] Tolerance to diet prescription [ x ] weights [ x ] labs[ x ] follow up per protocol  [ ] other: Assessment: Pt seen for moderate malnutrition follow up and TF.  Pt observed lethargic and unable to interview.    Factors impacting intake: [ ] none [ ] nausea  [ ] vomiting [ ] diarrhea [ ] constipation  [ ]chewing problems [ ] swallowing issues  [ ] other:     Diet Prescription: Diet, NPO with Tube Feed:   Tube Feeding Modality: Nasogastric  Vital AF  Total Volume for 24 Hours (mL): 1320  Continuous  Starting Tube Feed Rate {mL per Hour}: 30  Increase Tube Feed Rate by (mL): 10     Every 6 hours  Until Goal Tube Feed Rate (mL per Hour): 55  Tube Feed Duration (in Hours): 24  Tube Feed Start Time: 16:00 (04-08-19 @ 15:31)    Intake: Pt tolerating TF at goal rate 55 mL per hr, no residuals.  Pt receiving 1584 calories, 99 g protein, 1320 mL fluid, 1069 ml water on Vital AF @ 55 mL/hr x 24 hrs.     Current Weight: 74.9 kg (04-10-19); 75.9 kg (04-05); 74.4 kg (04-01)     % Weight Change: Weight stable x 9 days     Nutrition focused physical exam conducted:  Subcutaneous fat loss: [WDL] Orbital fat pads region, [WDL]Buccal fat region, [unable, edematous ]Triceps region,  [mild]Ribs region.  Muscle wasting: [mild]Temples region, [WDL ]Clavicle region, [mild ]Shoulder region, [unable]Scapula region, [unable-edematous]Interosseous region,  [moderate] thigh region, [mild] Calf region    Pertinent Medications: MEDICATIONS  (STANDING):  ampicillin/sulbactam  IVPB 3 Gram(s) IV Intermittent every 6 hours  dextrose 5% + sodium chloride 0.45%. 1000 milliLiter(s) (80 mL/Hr) IV Continuous <Continuous>  enoxaparin Injectable 40 milliGRAM(s) SubCutaneous every 24 hours  folic acid 1 milliGRAM(s) Oral daily  glycopyrrolate 1 milliGRAM(s) Oral two times a day  LORazepam   Injectable   IV Push   LORazepam   Injectable 1 milliGRAM(s) IV Push every 12 hours  multivitamin 1 Tablet(s) Oral daily  pantoprazole  Injectable 40 milliGRAM(s) IV Push every 12 hours  PHENobarbital Injectable 260 milliGRAM(s) IV Push once  potassium acid phosphate/sodium acid phosphate tablet (K-PHOS No. 2) 1 Tablet(s) Oral four times a day with meals  potassium chloride   Powder 40 milliEquivalent(s) Oral daily  sucralfate suspension 1 Gram(s) Oral four times a day    MEDICATIONS  (PRN):  acetaminophen   Tablet .. 650 milliGRAM(s) Oral every 6 hours PRN Temp greater or equal to 38C (100.4F), Mild Pain (1 - 3)  LORazepam   Injectable 2 milliGRAM(s) IV Push every 4 hours PRN CIWA>8  ondansetron Injectable 4 milliGRAM(s) IV Push every 6 hours PRN Nausea and/or Vomiting    Pertinent Labs: 04-10 Na140 mmol/L Glu 101 mg/dL<H> K+ 3.3 mmol/L<L> Cr  0.74 mg/dL BUN 4 mg/dL<L> 04-10 Phos 2.4 mg/dL<L> 04-06 Alb 2.6 g/dL<L>    Skin: Intact     Estimated Needs:   [X] no change since previous assessment (04-04-19)  [ ] recalculated:     Previous Nutrition Diagnosis:   [ ] Inadequate Energy Intake [ ]Inadequate Oral Intake [ ] Excessive Energy Intake   [ ] Underweight [ ] Increased Nutrient Needs [ ] Overweight/Obesity   [ ] Altered GI Function [ ] Unintended Weight Loss [ ] Food & Nutrition Related Knowledge Deficit   [X] Malnutrition: Moderate malnutrition in the context of chronic illness    Nutrition Diagnosis is [X] ongoing  [ ] resolved [ ] not applicable     Goal: Pt to obtain >/75% of protein and energy needs from enteral feedings. (Not met)     New Nutrition Diagnosis: [X] not applicable      Interventions:   Recommend  [ ] Continue   [ ] Change Diet To:  [ ] Nutrition Supplement  [X] Nutrition Support: Jevity 1.5 @ 40-55 mL/hr for 24 hrs continuous (goal rate provides 1342 mL, 2013 kcal, 86 g protein, 1020 mL)   [ ] Other:     Monitoring and Evaluation:   [ ] PO intake [ x ] Tolerance to diet prescription [ x ] weights [ x ] labs[ x ] follow up per protocol  [ ] other:

## 2019-04-11 LAB
ANION GAP SERPL CALC-SCNC: 9 MMOL/L — SIGNIFICANT CHANGE UP (ref 5–17)
BUN SERPL-MCNC: 7 MG/DL — SIGNIFICANT CHANGE UP (ref 7–23)
CALCIUM SERPL-MCNC: 9.2 MG/DL — SIGNIFICANT CHANGE UP (ref 8.5–10.1)
CHLORIDE SERPL-SCNC: 105 MMOL/L — SIGNIFICANT CHANGE UP (ref 96–108)
CO2 SERPL-SCNC: 24 MMOL/L — SIGNIFICANT CHANGE UP (ref 22–31)
CREAT SERPL-MCNC: 0.73 MG/DL — SIGNIFICANT CHANGE UP (ref 0.5–1.3)
GLUCOSE SERPL-MCNC: 108 MG/DL — HIGH (ref 70–99)
HCT VFR BLD CALC: 38 % — LOW (ref 39–50)
HGB BLD-MCNC: 12.3 G/DL — LOW (ref 13–17)
MCHC RBC-ENTMCNC: 27.7 PG — SIGNIFICANT CHANGE UP (ref 27–34)
MCHC RBC-ENTMCNC: 32.4 GM/DL — SIGNIFICANT CHANGE UP (ref 32–36)
MCV RBC AUTO: 85.6 FL — SIGNIFICANT CHANGE UP (ref 80–100)
NRBC # BLD: 0 /100 WBCS — SIGNIFICANT CHANGE UP (ref 0–0)
PLATELET # BLD AUTO: 307 K/UL — SIGNIFICANT CHANGE UP (ref 150–400)
POTASSIUM SERPL-MCNC: 3.7 MMOL/L — SIGNIFICANT CHANGE UP (ref 3.5–5.3)
POTASSIUM SERPL-SCNC: 3.7 MMOL/L — SIGNIFICANT CHANGE UP (ref 3.5–5.3)
RBC # BLD: 4.44 M/UL — SIGNIFICANT CHANGE UP (ref 4.2–5.8)
RBC # FLD: 13.1 % — SIGNIFICANT CHANGE UP (ref 10.3–14.5)
SODIUM SERPL-SCNC: 138 MMOL/L — SIGNIFICANT CHANGE UP (ref 135–145)
WBC # BLD: 5.85 K/UL — SIGNIFICANT CHANGE UP (ref 3.8–10.5)
WBC # FLD AUTO: 5.85 K/UL — SIGNIFICANT CHANGE UP (ref 3.8–10.5)

## 2019-04-11 RX ORDER — POTASSIUM CHLORIDE 20 MEQ
40 PACKET (EA) ORAL EVERY 4 HOURS
Qty: 0 | Refills: 0 | Status: DISCONTINUED | OUTPATIENT
Start: 2019-04-11 | End: 2019-04-11

## 2019-04-11 RX ORDER — PHENOBARBITAL 60 MG
130 TABLET ORAL ONCE
Qty: 0 | Refills: 0 | Status: DISCONTINUED | OUTPATIENT
Start: 2019-04-11 | End: 2019-04-11

## 2019-04-11 RX ORDER — RISPERIDONE 4 MG/1
0.25 TABLET ORAL
Qty: 0 | Refills: 0 | Status: DISCONTINUED | OUTPATIENT
Start: 2019-04-11 | End: 2019-04-12

## 2019-04-11 RX ADMIN — Medication 1 GRAM(S): at 00:00

## 2019-04-11 RX ADMIN — Medication 130 MILLIGRAM(S): at 16:32

## 2019-04-11 RX ADMIN — Medication 0.5 MILLIGRAM(S): at 20:57

## 2019-04-11 RX ADMIN — Medication 1 TABLET(S): at 11:14

## 2019-04-11 RX ADMIN — Medication 1 GRAM(S): at 11:15

## 2019-04-11 RX ADMIN — AMPICILLIN SODIUM AND SULBACTAM SODIUM 200 GRAM(S): 250; 125 INJECTION, POWDER, FOR SUSPENSION INTRAMUSCULAR; INTRAVENOUS at 20:58

## 2019-04-11 RX ADMIN — ENOXAPARIN SODIUM 40 MILLIGRAM(S): 100 INJECTION SUBCUTANEOUS at 20:58

## 2019-04-11 RX ADMIN — AMPICILLIN SODIUM AND SULBACTAM SODIUM 200 GRAM(S): 250; 125 INJECTION, POWDER, FOR SUSPENSION INTRAMUSCULAR; INTRAVENOUS at 02:36

## 2019-04-11 RX ADMIN — Medication 2 MILLIGRAM(S): at 14:09

## 2019-04-11 RX ADMIN — Medication 1 MILLIGRAM(S): at 11:13

## 2019-04-11 RX ADMIN — Medication 2 MILLIGRAM(S): at 02:36

## 2019-04-11 RX ADMIN — Medication 40 MILLIEQUIVALENT(S): at 11:14

## 2019-04-11 RX ADMIN — Medication 1 TABLET(S): at 11:15

## 2019-04-11 RX ADMIN — SODIUM CHLORIDE 80 MILLILITER(S): 9 INJECTION, SOLUTION INTRAVENOUS at 00:00

## 2019-04-11 RX ADMIN — Medication 1 GRAM(S): at 06:04

## 2019-04-11 RX ADMIN — PANTOPRAZOLE SODIUM 40 MILLIGRAM(S): 20 TABLET, DELAYED RELEASE ORAL at 05:56

## 2019-04-11 RX ADMIN — RISPERIDONE 0.25 MILLIGRAM(S): 4 TABLET ORAL at 20:58

## 2019-04-11 RX ADMIN — AMPICILLIN SODIUM AND SULBACTAM SODIUM 200 GRAM(S): 250; 125 INJECTION, POWDER, FOR SUSPENSION INTRAMUSCULAR; INTRAVENOUS at 14:26

## 2019-04-11 RX ADMIN — Medication 1 MILLIGRAM(S): at 05:56

## 2019-04-11 RX ADMIN — Medication 2 MILLIGRAM(S): at 12:04

## 2019-04-11 RX ADMIN — ROBINUL 1 MILLIGRAM(S): 0.2 INJECTION INTRAMUSCULAR; INTRAVENOUS at 06:05

## 2019-04-11 RX ADMIN — AMPICILLIN SODIUM AND SULBACTAM SODIUM 200 GRAM(S): 250; 125 INJECTION, POWDER, FOR SUSPENSION INTRAMUSCULAR; INTRAVENOUS at 10:08

## 2019-04-11 NOTE — SWALLOW BEDSIDE ASSESSMENT ADULT - SWALLOW EVAL: DIAGNOSIS
pt presented with oropharyngeal dysphagia marked by reduced oral grading, mild anterior and lateral loss of bolus, increased mastication time and mild lingual stasis with solids. no signs of aspiration across consistencies trialed.

## 2019-04-11 NOTE — SWALLOW BEDSIDE ASSESSMENT ADULT - H & P REVIEW
54 yo M with h/o Alcohol Use, PUD presents with  vomiting.   States  been vomiting all night, admits to drinking earlier, last drink yesterday, 1 pt/day.   Notes red blood in vomitus, associated with  epigastric pain. Denies CP, SOB, cough, palpitation, diarrhea , fever, chills, weakness, dysuria./yes

## 2019-04-11 NOTE — SWALLOW BEDSIDE ASSESSMENT ADULT - MODE OF PRESENTATION
self fed/spoon/fed by clinician cup/self fed/fed by clinician self fed/fed by clinician/cup fed by clinician/cup self fed

## 2019-04-11 NOTE — SWALLOW BEDSIDE ASSESSMENT ADULT - ORAL PREPARATORY PHASE
Reduced oral grading/mild anterior loss of bolus when self fed 2/2 impaired coordination of spoon to mouth movement/Anterior loss of bolus Reduced oral grading/mild lateral loss of bolus 2/2 when self fed 2/2 impaired coordination of cup to mouth movement/Lateral loss of bolus mild lateral loss of bolus 2/2 when self fed 2/2 impaired coordination of cup to mouth movement/Lateral loss of bolus/Reduced oral grading Lateral loss of bolus/Reduced oral grading/mild lateral loss of bolus 2/2 when self fed 2/2 impaired coordination of cup to mouth movement Decreased mastication ability/Within functional limits/increased mastication time

## 2019-04-11 NOTE — SWALLOW BEDSIDE ASSESSMENT ADULT - SWALLOW EVAL: RECOMMENDED FEEDING/EATING TECHNIQUES
maintain upright posture during/after eating for 30 mins/position upright (90 degrees)/small sips/bites/check mouth frequently for oral residue/pocketing

## 2019-04-11 NOTE — SWALLOW BEDSIDE ASSESSMENT ADULT - SLP GENERAL OBSERVATIONS
pt seen bedside, alert and oriented x3. pt answered autobiographical/want questions and followed one step directions. speech intelligibility good and vocal quality WNL.

## 2019-04-12 LAB
ALBUMIN SERPL ELPH-MCNC: 3 G/DL — LOW (ref 3.3–5)
ALP SERPL-CCNC: 40 U/L — SIGNIFICANT CHANGE UP (ref 40–120)
ALT FLD-CCNC: 28 U/L — SIGNIFICANT CHANGE UP (ref 12–78)
ANION GAP SERPL CALC-SCNC: 9 MMOL/L — SIGNIFICANT CHANGE UP (ref 5–17)
AST SERPL-CCNC: 23 U/L — SIGNIFICANT CHANGE UP (ref 15–37)
BILIRUB SERPL-MCNC: 0.3 MG/DL — SIGNIFICANT CHANGE UP (ref 0.2–1.2)
BUN SERPL-MCNC: 7 MG/DL — SIGNIFICANT CHANGE UP (ref 7–23)
CALCIUM SERPL-MCNC: 9 MG/DL — SIGNIFICANT CHANGE UP (ref 8.5–10.1)
CHLORIDE SERPL-SCNC: 106 MMOL/L — SIGNIFICANT CHANGE UP (ref 96–108)
CO2 SERPL-SCNC: 25 MMOL/L — SIGNIFICANT CHANGE UP (ref 22–31)
CREAT SERPL-MCNC: 0.76 MG/DL — SIGNIFICANT CHANGE UP (ref 0.5–1.3)
GLUCOSE SERPL-MCNC: 110 MG/DL — HIGH (ref 70–99)
HCT VFR BLD CALC: 38.2 % — LOW (ref 39–50)
HGB BLD-MCNC: 12.3 G/DL — LOW (ref 13–17)
MCHC RBC-ENTMCNC: 27.4 PG — SIGNIFICANT CHANGE UP (ref 27–34)
MCHC RBC-ENTMCNC: 32.2 GM/DL — SIGNIFICANT CHANGE UP (ref 32–36)
MCV RBC AUTO: 85.1 FL — SIGNIFICANT CHANGE UP (ref 80–100)
NRBC # BLD: 0 /100 WBCS — SIGNIFICANT CHANGE UP (ref 0–0)
PLATELET # BLD AUTO: 564 K/UL — HIGH (ref 150–400)
POTASSIUM SERPL-MCNC: 3.3 MMOL/L — LOW (ref 3.5–5.3)
POTASSIUM SERPL-SCNC: 3.3 MMOL/L — LOW (ref 3.5–5.3)
PROT SERPL-MCNC: 7.9 GM/DL — SIGNIFICANT CHANGE UP (ref 6–8.3)
RBC # BLD: 4.49 M/UL — SIGNIFICANT CHANGE UP (ref 4.2–5.8)
RBC # FLD: 13.1 % — SIGNIFICANT CHANGE UP (ref 10.3–14.5)
SODIUM SERPL-SCNC: 140 MMOL/L — SIGNIFICANT CHANGE UP (ref 135–145)
WBC # BLD: 4.9 K/UL — SIGNIFICANT CHANGE UP (ref 3.8–10.5)
WBC # FLD AUTO: 4.9 K/UL — SIGNIFICANT CHANGE UP (ref 3.8–10.5)

## 2019-04-12 PROCEDURE — 36000 PLACE NEEDLE IN VEIN: CPT

## 2019-04-12 PROCEDURE — 76937 US GUIDE VASCULAR ACCESS: CPT | Mod: 26

## 2019-04-12 RX ORDER — POTASSIUM CHLORIDE 20 MEQ
40 PACKET (EA) ORAL EVERY 4 HOURS
Qty: 0 | Refills: 0 | Status: COMPLETED | OUTPATIENT
Start: 2019-04-12 | End: 2019-04-12

## 2019-04-12 RX ORDER — RISPERIDONE 4 MG/1
1 TABLET ORAL
Qty: 0 | Refills: 0 | Status: DISCONTINUED | OUTPATIENT
Start: 2019-04-12 | End: 2019-04-13

## 2019-04-12 RX ADMIN — AMPICILLIN SODIUM AND SULBACTAM SODIUM 200 GRAM(S): 250; 125 INJECTION, POWDER, FOR SUSPENSION INTRAMUSCULAR; INTRAVENOUS at 13:33

## 2019-04-12 RX ADMIN — Medication 1 GRAM(S): at 17:14

## 2019-04-12 RX ADMIN — Medication 1 MILLIGRAM(S): at 11:15

## 2019-04-12 RX ADMIN — RISPERIDONE 0.25 MILLIGRAM(S): 4 TABLET ORAL at 06:17

## 2019-04-12 RX ADMIN — Medication 0.5 MILLIGRAM(S): at 06:17

## 2019-04-12 RX ADMIN — Medication 40 MILLIEQUIVALENT(S): at 11:17

## 2019-04-12 RX ADMIN — PANTOPRAZOLE SODIUM 40 MILLIGRAM(S): 20 TABLET, DELAYED RELEASE ORAL at 17:14

## 2019-04-12 RX ADMIN — PANTOPRAZOLE SODIUM 40 MILLIGRAM(S): 20 TABLET, DELAYED RELEASE ORAL at 06:17

## 2019-04-12 RX ADMIN — ENOXAPARIN SODIUM 40 MILLIGRAM(S): 100 INJECTION SUBCUTANEOUS at 20:48

## 2019-04-12 RX ADMIN — Medication 1 GRAM(S): at 11:15

## 2019-04-12 RX ADMIN — AMPICILLIN SODIUM AND SULBACTAM SODIUM 200 GRAM(S): 250; 125 INJECTION, POWDER, FOR SUSPENSION INTRAMUSCULAR; INTRAVENOUS at 02:48

## 2019-04-12 RX ADMIN — AMPICILLIN SODIUM AND SULBACTAM SODIUM 200 GRAM(S): 250; 125 INJECTION, POWDER, FOR SUSPENSION INTRAMUSCULAR; INTRAVENOUS at 08:18

## 2019-04-12 RX ADMIN — RISPERIDONE 1 MILLIGRAM(S): 4 TABLET ORAL at 17:14

## 2019-04-12 RX ADMIN — Medication 1 TABLET(S): at 11:15

## 2019-04-12 RX ADMIN — AMPICILLIN SODIUM AND SULBACTAM SODIUM 200 GRAM(S): 250; 125 INJECTION, POWDER, FOR SUSPENSION INTRAMUSCULAR; INTRAVENOUS at 20:48

## 2019-04-12 RX ADMIN — SODIUM CHLORIDE 80 MILLILITER(S): 9 INJECTION, SOLUTION INTRAVENOUS at 06:17

## 2019-04-12 RX ADMIN — Medication 40 MILLIEQUIVALENT(S): at 13:34

## 2019-04-12 RX ADMIN — Medication 1 GRAM(S): at 06:17

## 2019-04-12 NOTE — PROGRESS NOTE ADULT - PROBLEM SELECTOR PLAN 2
Ativan protocol   IVF  MVI, Folate, Thaimine  Monitoring CWA
fairly thick secretions with a cough and gag seems like tracheobronchitis or PNA, c/w Unasyn repeat CXR
start unasyn.
Continuing Ativan protocol    Monitoring
Dexmedetomidine Infusion and Phenobarb  Monitoring
Dexmedetomidine Infusion and Phenobarb  Monitoring

## 2019-04-12 NOTE — PROGRESS NOTE ADULT - REASON FOR ADMISSION
Vomiting

## 2019-04-12 NOTE — PHYSICAL THERAPY INITIAL EVALUATION ADULT - PLANNED THERAPY INTERVENTIONS, PT EVAL
bed mobility training/transfer training/stairs: GOAL: pt will be able to independently ascend/descent 2 steps with use of rail via step to step pattern within 4 weeks/balance training/gait training

## 2019-04-12 NOTE — PROGRESS NOTE ADULT - PROBLEM SELECTOR PROBLEM 1
Alcohol dependence with unspecified alcohol-induced disorder
Alcohol dependence with unspecified alcohol-induced disorder
Alcohol dependence with withdrawal delirium
PUD (peptic ulcer disease)
Delirium tremens
PUD (peptic ulcer disease)

## 2019-04-12 NOTE — PROGRESS NOTE ADULT - SUBJECTIVE AND OBJECTIVE BOX
Patient is a 53y old  Male who presents with a chief complaint of Vomiting (31 Mar 2019 21:37)      SUBJECTIVE/OBJECTIVE:    c/o nausea and discomfort.    MEDICATIONS  (STANDING):  dextrose 5% + sodium chloride 0.45%. 1000 milliLiter(s) (80 mL/Hr) IV Continuous <Continuous>  enoxaparin Injectable 40 milliGRAM(s) SubCutaneous every 24 hours  folic acid 1 milliGRAM(s) Oral daily  LORazepam   Injectable 2 milliGRAM(s) IV Push every 4 hours  LORazepam   Injectable 1.5 milliGRAM(s) IV Push every 4 hours  LORazepam   Injectable   IV Push   multivitamin 1 Tablet(s) Oral daily  pantoprazole Infusion 8 mG/Hr (10 mL/Hr) IV Continuous <Continuous>  potassium chloride    Tablet ER 40 milliEquivalent(s) Oral daily  sucralfate 1 Gram(s) Oral four times a day  thiamine 100 milliGRAM(s) Oral daily    MEDICATIONS  (PRN):  LORazepam   Injectable 2 milliGRAM(s) IntraMuscular every 1 hour PRN Symptom-triggered: each CIWA -Ar score 8 or GREATER  ondansetron Injectable 4 milliGRAM(s) IV Push every 6 hours PRN Nausea and/or Vomiting      Allergies    No Known Allergies    Intolerances          Vital Signs Last 24 Hrs  T(C): 37.3 (01 Apr 2019 05:27), Max: 37.4 (31 Mar 2019 15:02)  T(F): 99.1 (01 Apr 2019 05:27), Max: 99.4 (31 Mar 2019 15:02)  HR: 87 (01 Apr 2019 05:27) (87 - 140)  BP: 125/82 (01 Apr 2019 05:27) (105/65 - 153/112)  BP(mean): --  RR: 18 (01 Apr 2019 05:27) (18 - 24)  SpO2: 96% (01 Apr 2019 05:27) (95% - 98%)      Physical Exam:  Physical Exam: PHYSICAL EXAM:  GENERAL: NAD, well-groomed, well-developed  HEAD:  Atraumatic, Normocephalic  EYES: EOMI, PERRLA, conjunctiva and sclera clear  ENMT: No tonsillar erythema, exudates, or enlargement; Moist mucous membranes, No lesions  NECK: Supple, No JVD, Normal thyroid  NERVOUS SYSTEM:  Alert & Oriented X3; Motor Strength 5/5.   Mild resting tremor.  CHEST/LUNG: Clear bilaterally; No rales, rhonchi, wheezing, or rubs  HEART: Regular rate and rhythm; No murmurs, rubs, or gallops  ABDOMEN: Soft, Mild  Epigastric tenderness Nondistended; Bowel sounds present  EXTREMITIES:  2+ Peripheral Pulses, No clubbing, cyanosis, or edema  LYMPH: No lymphadenopathy noted SKIN: No rashes or lesions	        LABS:                        12.3   6.31  )-----------( 149      ( 01 Apr 2019 07:48 )             38.2     04-01    140  |  108  |  10  ----------------------------<  103<H>  3.7   |  26  |  0.89    Ca    8.3<L>      01 Apr 2019 07:48  Phos  2.2     04-01  Mg     2.5     04-01    TPro  7.0  /  Alb  3.2<L>  /  TBili  1.4<H>  /  DBili  .26<H>  /  AST  37  /  ALT  22  /  AlkPhos  43  04-01        CAPILLARY BLOOD GLUCOSE        CARDIAC MARKERS ( 31 Mar 2019 13:55 )  <.015 ng/mL / x     / x     / x     / <1.0 ng/mL        RADIOLOGY & ADDITIONAL TESTS:        Consultant(s) Notes Reviewed:  [xxxxx ] YES  [ ] NO
INTERVAL HPI/OVERNIGHT EVENTS:      52 yo M with h/o Alcohol Use, PUD presents with  vomiting.   States  been vomiting all night, admits to drinking earlier, last drink yesterday, 1 pt/day.   Notes red blood in vomitus, associated with  epigastric pain. Denies CP, SOB, cough, palpitation, diarrhea , fever, chills, weakness, dysuria. (31 Mar 2019 21:37)    24 hour events: Code gray called with CIWA 21, patient received total of 11mg of ativan since 7pm, and 3x 260mg of pehnobarbitol, CIWA still reamin above 20, patient started on precedex drip and transferred to critical care for further observation.  Patient calm this morning on Precedex.      CENTRAL LINE: [ ] YES [ x] NO  LOCATION:   DATE INSERTED:  REMOVE: [ ] YES [ ] NO  EXPLAIN:    CHAN: [ ] YES [ x] NO    DATE INSERTED:  REMOVE:  [ ] YES [ ] NO  EXPLAIN:    A-LINE:  [ ] YES [x ] NO  LOCATION:   DATE INSERTED:  REMOVE:  [ ] YES [ ] NO  EXPLAIN:    REVIEW OF SYSTEMS: [x] Unable to obtain because: sedated and delirious    ICU Vital Signs Last 24 Hrs  T(C): 35.7 (02 Apr 2019 15:29), Max: 37.5 (02 Apr 2019 12:00)  T(F): 96.3 (02 Apr 2019 15:29), Max: 99.5 (02 Apr 2019 12:00)  HR: 59 (02 Apr 2019 17:00) (57 - 129)  BP: 121/82 (02 Apr 2019 17:00) (118/93 - 165/85)  BP(mean): 94 (02 Apr 2019 17:00) (94 - 111)  ABP: --  ABP(mean): --  RR: 15 (02 Apr 2019 17:00) (13 - 28)  SpO2: 100% (02 Apr 2019 15:30) (96% - 100%)      I&O's Detail    01 Apr 2019 07:01  -  02 Apr 2019 07:00  --------------------------------------------------------  IN:    dexmedetomidine Infusion: 31.1 mL    Oral Fluid: 360 mL  Total IN: 391.1 mL    OUT:    Voided: 550 mL  Total OUT: 550 mL    Total NET: -158.9 mL      02 Apr 2019 07:01  -  02 Apr 2019 17:35  --------------------------------------------------------  IN:    dexmedetomidine Infusion: 242.1 mL    dextrose 5% + sodium chloride 0.45%.: 800 mL    pantoprazole Infusion: 90 mL  Total IN: 1132.1 mL    OUT:    Indwelling Catheter - Urethral: 1350 mL  Total OUT: 1350 mL    Total NET: -217.9 mL      MEDICATIONS  NEURO  Meds: acetaminophen   Tablet .. 650 milliGRAM(s) Oral every 6 hours PRN Mild Pain (1 - 3)  dexmedetomidine Infusion 0.2 MICROgram(s)/kG/Hr (3.72 mL/Hr) IV Continuous <Continuous>  ondansetron Injectable 4 milliGRAM(s) IV Push every 6 hours PRN Nausea and/or Vomiting  PHENobarbital Injectable 130 milliGRAM(s) IV Push every 6 hours  PHENobarbital Injectable 260 milliGRAM(s) IV Push every 15 minutes PRN CIWA >18  PHENobarbital Injectable 130 milliGRAM(s) IV Push every 1 hour PRN CIWA >8    RESPIRATORY  ABG - ( 02 Apr 2019 13:31 )  pH: x     /  pCO2: x     /  pO2: x     / HCO3: x     / Base Excess: x     /  SaO2: x       Lactate: 1.3        Meds:   CARDIOVASCULAR  Meds:   GI/NUTRITION  Meds: pantoprazole Infusion 8 mG/Hr IV Continuous <Continuous>  sucralfate 1 Gram(s) Oral four times a day    GENITOURINARY  Meds: dextrose 5% + sodium chloride 0.45%. 1000 milliLiter(s) IV Continuous <Continuous>  folic acid 1 milliGRAM(s) Oral daily  multivitamin 1 Tablet(s) Oral daily  potassium chloride    Tablet ER 40 milliEquivalent(s) Oral daily  thiamine 100 milliGRAM(s) Oral daily    HEMATOLOGIC  Meds: enoxaparin Injectable 40 milliGRAM(s) SubCutaneous every 24 hours    [x] VTE Prophylaxis  INFECTIOUS DISEASES  Meds:   ENDOCRINE  CAPILLARY BLOOD GLUCOSE    Meds:   OTHER MEDICATIONS:  :    PHYSICAL EXAM:    GENERAL: NAD, sedated on precedex  HEAD:  Atraumatic, Normocephalic  EYES: EOMI, PERRLA, conjunctiva and sclera clear  NECK: Supple, No JVD, Normal thyroid  CHEST/LUNG: Clear to auscultation bilaterally; No rales, rhonchi, wheezing, or rubs  HEART: Regular rate and rhythm; No murmurs, rubs, or gallops  ABDOMEN: Soft, Nontender, Nondistended; Bowel sounds present  EXTREMITIES:  2+ Peripheral Pulses, No clubbing, cyanosis, or edema  NERVOUS SYSTEM:  moving all extremities    LABS:                        14.4   6.33  )-----------( 109      ( 02 Apr 2019 09:25 )             44.9      04-02    142  |  110<H>  |  6<L>  ----------------------------<  166<H>  3.9   |  25  |  0.78    Ca    8.4<L>      02 Apr 2019 12:37  Phos  3.4     04-02  Mg     2.1     04-02    TPro  6.8  /  Alb  3.4  /  TBili  1.0  /  DBili  x   /  AST  48<H>  /  ALT  32  /  AlkPhos  43  04-02      GLOBAL ISSUE/BEST PRACTICE:  Analgesia:  Sedation: Precedex/ Phenobarb  HOB elevation: yes  Stress ulcer prophylaxis: n/a  VTE prophylaxis: n/a  Oral Care: Chlorohexidine  Glycemic control: ISS  Nutrition: NPO
INTERVAL HPI/OVERNIGHT EVENTS:      52 yo M with h/o Alcohol Use, PUD presents with  vomiting.   States  been vomiting all night, admits to drinking earlier, last drink yesterday, 1 pt/day.   Notes red blood in vomitus, associated with  epigastric pain. Denies CP, SOB, cough, palpitation, diarrhea , fever, chills, weakness, dysuria. (31 Mar 2019 21:37)    24 hour events: Febrile this AM.  Tylenol given.  UA and Chest xray ordered.      CENTRAL LINE: [ ] YES [ x] NO  LOCATION:   DATE INSERTED:  REMOVE: [ ] YES [ ] NO  EXPLAIN:    CHAN: [ ] YES [ x] NO    DATE INSERTED:  REMOVE:  [ ] YES [ ] NO  EXPLAIN:    A-LINE:  [ ] YES [x ] NO  LOCATION:   DATE INSERTED:  REMOVE:  [ ] YES [ ] NO  EXPLAIN:    REVIEW OF SYSTEMS: [x] Unable to obtain because: sedated and delirious  ICU Vital Signs Last 24 Hrs  T(C): 36.1 (2019 15:32), Max: 39.1 (2019 07:31)  T(F): 97 (2019 15:32), Max: 102.3 (2019 07:31)  HR: 65 (2019 16:00) (63 - 109)  BP: 111/74 (2019 16:00) (76/65 - 138/98)  BP(mean): 86 (2019 16:00) (60 - 115)  ABP: --  ABP(mean): --  RR: 14 (2019 16:00) (5 - 32)  SpO2: 100% (2019 16:00) (98% - 100%)      I&O's Detail    2019 07:01  -  2019 07:00  --------------------------------------------------------  IN:    dexmedetomidine Infusion: 420.5 mL    dextrose 5% + sodium chloride 0.45%.: 1920 mL    pantoprazole Infusion: 30 mL  Total IN: 2370.5 mL    OUT:    Indwelling Catheter - Urethral: 2500 mL  Total OUT: 2500 mL    Total NET: -129.5 mL      2019 07:01  -  2019 17:05  --------------------------------------------------------  IN:    dexmedetomidine Infusion: 158.7 mL    dextrose 5% + sodium chloride 0.45%.: 720 mL    Sodium Chloride 0.9% IV Bolus: 1000 mL  Total IN: 1878.7 mL    OUT:    Indwelling Catheter - Urethral: 200 mL    Voided: 800 mL  Total OUT: 1000 mL    Total NET: 878.7 mL              MEDICATIONS  NEURO  Meds: acetaminophen   Tablet .. 650 milliGRAM(s) Oral every 6 hours PRN Temp greater or equal to 38C (100.4F), Mild Pain (1 - 3)  dexmedetomidine Infusion 0.2 MICROgram(s)/kG/Hr (3.72 mL/Hr) IV Continuous <Continuous>  ondansetron Injectable 4 milliGRAM(s) IV Push every 6 hours PRN Nausea and/or Vomiting  PHENobarbital Injectable 260 milliGRAM(s) IV Push once  PHENobarbital Injectable 130 milliGRAM(s) IV Push every 6 hours  PHENobarbital Injectable 130 milliGRAM(s) IV Push every 1 hour PRN CIWA >8    RESPIRATORY    Meds:   CARDIOVASCULAR  Meds:   GI/NUTRITION  Meds: pantoprazole  Injectable 40 milliGRAM(s) IV Push every 12 hours  sucralfate suspension 1 Gram(s) Oral four times a day    GENITOURINARY  Meds: dextrose 5% + sodium chloride 0.45%. 1000 milliLiter(s) IV Continuous <Continuous>  folic acid 1 milliGRAM(s) Oral daily  multivitamin 1 Tablet(s) Oral daily  potassium chloride   Powder 40 milliEquivalent(s) Oral daily    HEMATOLOGIC  Meds: enoxaparin Injectable 40 milliGRAM(s) SubCutaneous every 24 hours    [x] VTE Prophylaxis  INFECTIOUS DISEASES  Meds:   ENDOCRINE  CAPILLARY BLOOD GLUCOSE        Meds:   OTHER MEDICATIONS:  :    PHYSICAL EXAM:    GENERAL: NAD, w  HEAD:  Atraumatic, Normocephalic  EYES: EOMI, PERRLA, conjunctiva and sclera clear  NECK: Supple, No JVD, Normal thyroid  CHEST/LUNG: Clear to auscultation bilaterally; No rales, rhonchi, wheezing, or rubs  HEART: Regular rate and rhythm; No murmurs, rubs, or gallops  ABDOMEN: Soft, Nontender, Nondistended; Bowel sounds present  EXTREMITIES:  2+ Peripheral Pulses, No clubbing, cyanosis, or edema  NERVOUS SYSTEM:  sedated on precedex, occasionally agitated.     LABS:                        13.1   5.03  )-----------( 171      ( 2019 04:13 )             40.2      04-05    139  |  107  |  5<L>  ----------------------------<  106<H>  3.3<L>   |  23  |  0.64    Ca    8.5      2019 04:13  Phos  2.6     04-05  Mg     2.2     04-05    TPro  7.1  /  Alb  2.8<L>  /  TBili  0.4  /  DBili  x   /  AST  19  /  ALT  27  /  AlkPhos  46  04-05      Urinalysis Basic - ( 2019 16:36 )    Color: Yellow / Appearance: Clear / S.005 / pH: x  Gluc: x / Ketone: Negative  / Bili: Negative / Urobili: Negative mg/dL   Blood: x / Protein: Negative mg/dL / Nitrite: Negative   Leuk Esterase: Small / RBC: 0-2 /HPF / WBC 3-5   Sq Epi: x / Non Sq Epi: Occasional / Bacteria: Many    GLOBAL ISSUE/BEST PRACTICE:  Analgesia:  Sedation: Precedex/ Phenobarb  HOB elevation: yes	  Stress ulcer prophylaxis: n/a  VTE prophylaxis: n/a  Oral Care: Chlorohexidine  Glycemic control: ISS  Nutrition: NPO  CULTURES
INTERVAL HPI/OVERNIGHT EVENTS:      52 yo M with h/o Alcohol Use, PUD presents with  vomiting.   States  been vomiting all night, admits to drinking earlier, last drink yesterday, 1 pt/day.   Notes red blood in vomitus, associated with  epigastric pain. Denies CP, SOB, cough, palpitation, diarrhea , fever, chills, weakness, dysuria. (31 Mar 2019 21:37)    24 hour events: required 2 phenobarb 260 and 130 pushes overnight.  Precedex now 0.5mcg/kg/min.  NGT placed.      CENTRAL LINE: [ ] YES [ x] NO  LOCATION:   DATE INSERTED:  REMOVE: [ ] YES [ ] NO  EXPLAIN:    CHAN: [ ] YES [ x] NO    DATE INSERTED:  REMOVE:  [ ] YES [ ] NO  EXPLAIN:    A-LINE:  [ ] YES [x ] NO  LOCATION:   DATE INSERTED:  REMOVE:  [ ] YES [ ] NO  EXPLAIN:    REVIEW OF SYSTEMS: [x] Unable to obtain because: sedated and delirious      ICU Vital Signs Last 24 Hrs  T(C): 36.6 (04 Apr 2019 04:00), Max: 37 (03 Apr 2019 15:46)  T(F): 97.9 (04 Apr 2019 04:00), Max: 98.6 (03 Apr 2019 15:46)  HR: 65 (04 Apr 2019 13:00) (63 - 88)  BP: 92/74 (04 Apr 2019 13:00) (84/65 - 126/86)  BP(mean): 81 (04 Apr 2019 13:00) (72 - 100)  ABP: --  ABP(mean): --  RR: 16 (04 Apr 2019 13:00) (12 - 22)  SpO2: 100% (04 Apr 2019 13:00) (100% - 100%)      I&O's Detail    03 Apr 2019 07:01  -  04 Apr 2019 07:00  --------------------------------------------------------  IN:    dexmedetomidine Infusion: 320.7 mL    dextrose 5% + sodium chloride 0.45%.: 1920 mL    Oral Fluid: 50 mL    pantoprazole Infusion: 240 mL  Total IN: 2530.7 mL    OUT:    Indwelling Catheter - Urethral: 3725 mL  Total OUT: 3725 mL    Total NET: -1194.3 mL      04 Apr 2019 07:01  -  04 Apr 2019 14:26  --------------------------------------------------------  IN:    dexmedetomidine Infusion: 87 mL    dextrose 5% + sodium chloride 0.45%.: 480 mL    pantoprazole Infusion: 30 mL  Total IN: 597 mL    OUT:    Indwelling Catheter - Urethral: 300 mL  Total OUT: 300 mL    Total NET: 297 mL    MEDICATIONS  NEURO  Meds: acetaminophen   Tablet .. 650 milliGRAM(s) Oral every 6 hours PRN Mild Pain (1 - 3)  dexmedetomidine Infusion 0.2 MICROgram(s)/kG/Hr (3.72 mL/Hr) IV Continuous <Continuous>  ondansetron Injectable 4 milliGRAM(s) IV Push every 6 hours PRN Nausea and/or Vomiting  PHENobarbital Injectable 130 milliGRAM(s) IV Push every 6 hours  PHENobarbital Injectable 130 milliGRAM(s) IV Push every 1 hour PRN CIWA >8    RESPIRATORY    Meds:   CARDIOVASCULAR  Meds:   GI/NUTRITION  Meds: pantoprazole  Injectable 40 milliGRAM(s) IV Push every 12 hours  sucralfate suspension 1 Gram(s) Oral four times a day    GENITOURINARY  Meds: dextrose 5% + sodium chloride 0.45%. 1000 milliLiter(s) IV Continuous <Continuous>  folic acid 1 milliGRAM(s) Oral daily  multivitamin 1 Tablet(s) Oral daily  potassium chloride    Tablet ER 40 milliEquivalent(s) Oral daily    HEMATOLOGIC  Meds: enoxaparin Injectable 40 milliGRAM(s) SubCutaneous every 24 hours    [x] VTE Prophylaxis  INFECTIOUS DISEASES  Meds:   ENDOCRINE  CAPILLARY BLOOD GLUCOSE        Meds:   OTHER MEDICATIONS:  :    PHYSICAL EXAM:    GENERAL: NAD, sedated on precedex  HEAD:  Atraumatic, Normocephalic  EYES: EOMI, PERRLA, conjunctiva and sclera clear  NECK: Supple, No JVD, Normal thyroid  CHEST/LUNG: Clear to auscultation bilaterally; No rales, rhonchi, wheezing, or rubs  HEART: Regular rate and rhythm; No murmurs, rubs, or gallops  ABDOMEN: Soft, Nontender, Nondistended; Bowel sounds present  EXTREMITIES:  2+ Peripheral Pulses, No clubbing, cyanosis, or edema  NERVOUS SYSTEM:  moving all extremities    LABS:                        13.3   5.00  )-----------( 146      ( 04 Apr 2019 04:19 )             42.1      04-04    140  |  111<H>  |  7   ----------------------------<  105<H>  4.2   |  22  |  0.75    Ca    8.5      04 Apr 2019 04:19  Phos  3.8     04-04  Mg     2.0     04-04    TPro  6.3  /  Alb  3.0<L>  /  TBili  0.9  /  DBili  x   /  AST  38<H>  /  ALT  30  /  AlkPhos  44  04-03    GLOBAL ISSUE/BEST PRACTICE:  Analgesia:  Sedation: Precedex/ Phenobarb  HOB elevation: yes	  Stress ulcer prophylaxis: n/a  VTE prophylaxis: n/a  Oral Care: Chlorohexidine  Glycemic control: ISS  Nutrition: NPO
INTERVAL HPI/OVERNIGHT EVENTS:      52 yo M with h/o Alcohol Use, PUD presents with  vomiting.   States  been vomiting all night, admits to drinking earlier, last drink yesterday, 1 pt/day.   Notes red blood in vomitus, associated with  epigastric pain. Denies CP, SOB, cough, palpitation, diarrhea , fever, chills, weakness, dysuria. (31 Mar 2019 21:37)    24 hour events: required 2 phenobarb pushes overnight, 1 phenobarb push this AM.  Precedex now 0.5mcg/kg/min.      CENTRAL LINE: [ ] YES [ x] NO  LOCATION:   DATE INSERTED:  REMOVE: [ ] YES [ ] NO  EXPLAIN:    CHAN: [ ] YES [ x] NO    DATE INSERTED:  REMOVE:  [ ] YES [ ] NO  EXPLAIN:    A-LINE:  [ ] YES [x ] NO  LOCATION:   DATE INSERTED:  REMOVE:  [ ] YES [ ] NO  EXPLAIN:    REVIEW OF SYSTEMS: [x] Unable to obtain because: sedated and delirious    ICU Vital Signs Last 24 Hrs  T(C): 37.3 (03 Apr 2019 12:00), Max: 37.8 (03 Apr 2019 07:30)  T(F): 99.1 (03 Apr 2019 12:00), Max: 100 (03 Apr 2019 07:30)  HR: 70 (03 Apr 2019 15:00) (57 - 95)  BP: 97/75 (03 Apr 2019 15:00) (79/59 - 130/84)  BP(mean): 83 (03 Apr 2019 15:00) (67 - 101)  ABP: --  ABP(mean): --  RR: 17 (03 Apr 2019 15:00) (8 - 23)  SpO2: 100% (03 Apr 2019 15:00) (82% - 100%)    I&O's Detail    02 Apr 2019 07:01  -  03 Apr 2019 07:00  --------------------------------------------------------  IN:    dexmedetomidine Infusion: 366.3 mL    dextrose 5% + sodium chloride 0.45%.: 1920 mL    pantoprazole Infusion: 230 mL  Total IN: 2516.3 mL    OUT:    Indwelling Catheter - Urethral: 2635 mL  Total OUT: 2635 mL    Total NET: -118.7 mL      03 Apr 2019 07:01  -  03 Apr 2019 15:29  --------------------------------------------------------  IN:    dexmedetomidine Infusion: 99.2 mL    dextrose 5% + sodium chloride 0.45%.: 640 mL    pantoprazole Infusion: 80 mL  Total IN: 819.2 mL    OUT:    Indwelling Catheter - Urethral: 860 mL  Total OUT: 860 mL    Total NET: -40.8 mL      MEDICATIONS  NEURO  Meds: acetaminophen   Tablet .. 650 milliGRAM(s) Oral every 6 hours PRN Mild Pain (1 - 3)  dexmedetomidine Infusion 0.2 MICROgram(s)/kG/Hr (3.72 mL/Hr) IV Continuous <Continuous>  ondansetron Injectable 4 milliGRAM(s) IV Push every 6 hours PRN Nausea and/or Vomiting  PHENobarbital Injectable 130 milliGRAM(s) IV Push every 6 hours  PHENobarbital Injectable 260 milliGRAM(s) IV Push every 15 minutes PRN CIWA >18  PHENobarbital Injectable 130 milliGRAM(s) IV Push every 1 hour PRN CIWA >8    RESPIRATORY  ABG - ( 02 Apr 2019 13:31 )  pH: x     /  pCO2: x     /  pO2: x     / HCO3: x     / Base Excess: x     /  SaO2: x       Lactate: 1.3              Meds:   CARDIOVASCULAR  Meds:   GI/NUTRITION  Meds: pantoprazole Infusion 8 mG/Hr IV Continuous <Continuous>  sucralfate 1 Gram(s) Oral four times a day    GENITOURINARY  Meds: dextrose 5% + sodium chloride 0.45%. 1000 milliLiter(s) IV Continuous <Continuous>  folic acid 1 milliGRAM(s) Oral daily  multivitamin 1 Tablet(s) Oral daily  potassium chloride    Tablet ER 40 milliEquivalent(s) Oral daily    HEMATOLOGIC  Meds: enoxaparin Injectable 40 milliGRAM(s) SubCutaneous every 24 hours    [x] VTE Prophylaxis  INFECTIOUS DISEASES  Meds:   ENDOCRINE  CAPILLARY BLOOD GLUCOSE    Meds:   OTHER MEDICATIONS:    PHYSICAL EXAM:    GENERAL: NAD, sedated on precedex  HEAD:  Atraumatic, Normocephalic  EYES: EOMI, PERRLA, conjunctiva and sclera clear  NECK: Supple, No JVD, Normal thyroid  CHEST/LUNG: Clear to auscultation bilaterally; No rales, rhonchi, wheezing, or rubs  HEART: Regular rate and rhythm; No murmurs, rubs, or gallops  ABDOMEN: Soft, Nontender, Nondistended; Bowel sounds present  EXTREMITIES:  2+ Peripheral Pulses, No clubbing, cyanosis, or edema  NERVOUS SYSTEM:  moving all extremities    LABS:                        12.3   5.99  )-----------( 135      ( 03 Apr 2019 03:49 )             38.4      04-03    143  |  111<H>  |  7   ----------------------------<  101<H>  3.7   |  23  |  0.92    Ca    8.2<L>      03 Apr 2019 03:49  Phos  2.7     04-03  Mg     1.9     04-03    TPro  6.3  /  Alb  3.0<L>  /  TBili  0.9  /  DBili  x   /  AST  38<H>  /  ALT  30  /  AlkPhos  44  04-03    GLOBAL ISSUE/BEST PRACTICE:  Analgesia:  Sedation: Precedex/ Phenobarb  HOB elevation: yes  Stress ulcer prophylaxis: n/a  VTE prophylaxis: n/a  Oral Care: Chlorohexidine  Glycemic control: ISS  Nutrition: NPO    RADIOLOGY & ADDITIONAL STUDIES:    CULTURES
INTERVAL HPI/OVERNIGHT EVENTS:   HPI:  52 yo M with h/o Alcohol Use, PUD presents with  vomiting.   States  been vomiting all night, admits to drinking earlier, last drink yesterday, 1 pt/day.   Notes red blood in vomitus, associated with  epigastric pain. Denies CP, SOB, cough, palpitation, diarrhea , fever, chills, weakness, dysuria. (31 Mar 2019 21:37)  ETOH withdrawal still requiring precedex drip       PAST MEDICAL & SURGICAL HISTORY:  EtOH dependence  Mixed hyperlipidemia  PUD (peptic ulcer disease)  No significant past surgical history        T(C): 39 (2019 11:52), Max: 39 (2019 11:52)  T(F): 102.2 (2019 11:52), Max: 102.2 (2019 11:52)  HR: 94 (2019 14:00) (63 - 112)  BP: 124/83 (2019 14:00) (90/54 - 173/97)  BP(mean): 97 (2019 14:00) (65 - 132)  ABP: --  ABP(mean): --  RR: 18 (2019 14:00) (12 - 30)  SpO2: 99% (2019 14:00) (80% - 100%)          I&O's Detail    2019 07:01  -  2019 07:00  --------------------------------------------------------  IN:    dexmedetomidine Infusion: 393 mL    dextrose 5% + sodium chloride 0.45%.: 1920 mL    Sodium Chloride 0.9% IV Bolus: 1000 mL  Total IN: 3313 mL    OUT:    Indwelling Catheter - Urethral: 200 mL    Voided: 1800 mL  Total OUT: 2000 mL    Total NET: 1313 mL      2019 07:01  -  2019 14:55  --------------------------------------------------------  IN:    dexmedetomidine Infusion: 68.5 mL    dextrose 5% + sodium chloride 0.45%.: 560 mL    IV PiggyBack: 100 mL  Total IN: 728.5 mL    OUT:    Voided: 700 mL  Total OUT: 700 mL    Total NET: 28.5 mL            CAPILLARY BLOOD GLUCOSE        PHYSICAL EXAM:    GENERAL: calm on precedex NAd  HEENT NO JVD No LAD  Lungs CTA B.L  CVS S1, S2 RRR  ABD Nt/Nd  Ext No edema    LABS:                        12.3   4.29  )-----------( 195      ( 2019 04:00 )             37.7      04-06    140  |  110<H>  |  6<L>  ----------------------------<  109<H>  3.5   |  22  |  0.66    Ca    8.2<L>      2019 04:00  Phos  2.4     04-06  Mg     2.0     -    TPro  6.7  /  Alb  2.6<L>  /  TBili  0.2  /  DBili  x   /  AST  10<L>  /  ALT  18  /  AlkPhos  39<L>  -      Urinalysis Basic - ( 2019 16:36 )    Color: Yellow / Appearance: Clear / S.005 / pH: x  Gluc: x / Ketone: Negative  / Bili: Negative / Urobili: Negative mg/dL   Blood: x / Protein: Negative mg/dL / Nitrite: Negative   Leuk Esterase: Small / RBC: 0-2 /HPF / WBC 3-5   Sq Epi: x / Non Sq Epi: Occasional / Bacteria: Many      Culture Results:   No growth to date. ( @ 13:11)      RADIOLOGY & ADDITIONAL STUDIES:      Assessment and Plan:    CRITICAL CARE TIME SPENT:
INTERVAL HPI/OVERNIGHT EVENTS:   HPI:  52 yo M with h/o Alcohol Use, PUD presents with  vomiting.   States  been vomiting all night, admits to drinking earlier, last drink yesterday, 1 pt/day.   Notes red blood in vomitus, associated with  epigastric pain. Denies CP, SOB, cough, palpitation, diarrhea , fever, chills, weakness, dysuria. (31 Mar 2019 21:37)  Off precedex since yesterday no O/N events         PAST MEDICAL & SURGICAL HISTORY:  EtOH dependence  Mixed hyperlipidemia  PUD (peptic ulcer disease)  No significant past surgical history           ICU Vital Signs Last 24 Hrs  T(C): 37.7 (09 Apr 2019 07:35), Max: 38.2 (08 Apr 2019 19:59)  T(F): 99.8 (09 Apr 2019 07:35), Max: 100.8 (08 Apr 2019 19:59)  HR: 91 (09 Apr 2019 09:00) (88 - 105)  BP: 145/98 (09 Apr 2019 09:00) (116/69 - 155/98)  BP(mean): 112 (09 Apr 2019 09:00) (83 - 115)  ABP: --  ABP(mean): --  RR: 23 (09 Apr 2019 09:00) (12 - 29)  SpO2: 99% (09 Apr 2019 09:00) (92% - 100%)          I&O's Detail    08 Apr 2019 07:01  -  09 Apr 2019 07:00  --------------------------------------------------------  IN:    dexmedetomidine Infusion: 24.9 mL    dextrose 5% + sodium chloride 0.45%.: 1840 mL    ns in tub fed vital1: 25 mL    Solution: 400 mL  Total IN: 2289.9 mL    OUT:    Incontinent per Condom Catheter: 3000 mL  Total OUT: 3000 mL    Total NET: -710.1 mL      09 Apr 2019 07:01  -  09 Apr 2019 10:03  --------------------------------------------------------  IN:    dextrose 5% + sodium chloride 0.45%.: 160 mL    ns in tub fed vital1: 30 mL    Solution: 100 mL  Total IN: 290 mL    OUT:  Total OUT: 0 mL    Total NET: 290 mL            CAPILLARY BLOOD GLUCOSE        PHYSICAL EXAM:    GENERAL: Awake , alert NAD, copious oral secretion but frequency of suctioning has decreased per RN  HEENT No JVD  Lungs CTA B/L  CVS S1 S2 RRR  ABD NT/ND  ext no edema    LABS:                        11.2   6.74  )-----------( 328      ( 09 Apr 2019 03:48 )             34.3      04-09    142  |  109<H>  |  4<L>  ----------------------------<  107<H>  3.4<L>   |  26  |  0.80    Ca    8.6      09 Apr 2019 03:48  Phos  2.9     04-09  Mg     2.1     04-09              RADIOLOGY & ADDITIONAL STUDIES:      Assessment and Plan:    CRITICAL CARE TIME SPENT:
INTERVAL HPI/OVERNIGHT EVENTS:   HPI:  54 yo M with h/o Alcohol Use, PUD presents with  vomiting.   States  been vomiting all night, admits to drinking earlier, last drink yesterday, 1 pt/day.   Notes red blood in vomitus, associated with  epigastric pain. Denies CP, SOB, cough, palpitation, diarrhea , fever, chills, weakness, dysuria. (31 Mar 2019 21:37)           PAST MEDICAL & SURGICAL HISTORY:  EtOH dependence  Mixed hyperlipidemia  PUD (peptic ulcer disease)  No significant past surgical history                ICU Vital Signs Last 24 Hrs  T(C): 39.6 (2019 07:20), Max: 39.6 (2019 07:20)  T(F): 103.3 (2019 07:20), Max: 103.3 (2019 07:20)  HR: 107 (2019 09:00) (87 - 119)  BP: 144/92 (2019 09:00) (94/62 - 156/94)  BP(mean): 105 (2019 09:00) (72 - 117)  ABP: --  ABP(mean): --  RR: 26 (2019 09:00) (17 - 29)  SpO2: 100% (2019 09:00) (92% - 100%)          I&O's Detail    2019 07:01  -  2019 07:00  --------------------------------------------------------  IN:    dexmedetomidine Infusion: 173.9 mL    dextrose 5% + sodium chloride 0.45%.: 1920 mL    IV PiggyBack: 100 mL  Total IN: 2193.9 mL    OUT:    Voided: 1770 mL  Total OUT: 1770 mL    Total NET: 423.9 mL      2019 07:01  -  2019 09:39  --------------------------------------------------------  IN:    dexmedetomidine Infusion: 6.2 mL    dextrose 5% + sodium chloride 0.45%.: 80 mL  Total IN: 86.2 mL    OUT:  Total OUT: 0 mL    Total NET: 86.2 mL            CAPILLARY BLOOD GLUCOSE        PHYSICAL EXAM:    GENERAL: hypoactive delirium but arousable has cough  HNEENt No JVD  Lungs CTA B/L  CVS S1 S2 RRR  ABD NT/ND  EXT No edema      LABS:                        12.3   4.29  )-----------( 195      ( 2019 04:00 )             37.7      04-    137  |  106  |  3<L>  ----------------------------<  96  3.1<L>   |  22  |  0.77    Ca    8.4<L>      2019 04:22  Phos  2.0       Mg     1.9         TPro  6.7  /  Alb  2.6<L>  /  TBili  0.2  /  DBili  x   /  AST  10<L>  /  ALT  18  /  AlkPhos  39<L>        Urinalysis Basic - ( 2019 16:36 )    Color: Yellow / Appearance: Clear / S.005 / pH: x  Gluc: x / Ketone: Negative  / Bili: Negative / Urobili: Negative mg/dL   Blood: x / Protein: Negative mg/dL / Nitrite: Negative   Leuk Esterase: Small / RBC: 0-2 /HPF / WBC 3-5   Sq Epi: x / Non Sq Epi: Occasional / Bacteria: Many      Culture Results:   No growth to date. ( @ 13:11)      RADIOLOGY & ADDITIONAL STUDIES:      Assessment and Plan:    CRITICAL CARE TIME SPENT:
Patient is a 53y old  Male who presents with a chief complaint of Vomiting (02 Apr 2019 05:41)      SUBJECTIVE/OBJECTIVE:    In ICU  Event noted;  Code gray called with CIWA 21, patient received total of 11mg of ativan since 7pm, and 3x 260mg of pehnobarbitol, CIWA still reamin above 20, patient started on precedex drip and transferred to critical care for further observation.      MEDICATIONS  (STANDING):  dexmedetomidine Infusion 0.2 MICROgram(s)/kG/Hr (3.72 mL/Hr) IV Continuous <Continuous>  dextrose 5% + sodium chloride 0.45%. 1000 milliLiter(s) (80 mL/Hr) IV Continuous <Continuous>  enoxaparin Injectable 40 milliGRAM(s) SubCutaneous every 24 hours  folic acid 1 milliGRAM(s) Oral daily  multivitamin 1 Tablet(s) Oral daily  pantoprazole Infusion 8 mG/Hr (10 mL/Hr) IV Continuous <Continuous>  PHENobarbital Injectable 130 milliGRAM(s) IV Push every 6 hours  potassium chloride    Tablet ER 40 milliEquivalent(s) Oral daily  sucralfate 1 Gram(s) Oral four times a day  thiamine 100 milliGRAM(s) Oral daily    MEDICATIONS  (PRN):  acetaminophen   Tablet .. 650 milliGRAM(s) Oral every 6 hours PRN Mild Pain (1 - 3)  ondansetron Injectable 4 milliGRAM(s) IV Push every 6 hours PRN Nausea and/or Vomiting  PHENobarbital Injectable 260 milliGRAM(s) IV Push every 15 minutes PRN CIWA >18  PHENobarbital Injectable 130 milliGRAM(s) IV Push every 1 hour PRN CIWA >8      Allergies    No Known Allergies    Intolerances          Vital Signs Last 24 Hrs  T(C): 37.5 (02 Apr 2019 12:00), Max: 37.5 (02 Apr 2019 12:00)  T(F): 99.5 (02 Apr 2019 12:00), Max: 99.5 (02 Apr 2019 12:00)  HR: 59 (02 Apr 2019 14:30) (59 - 129)  BP: 126/90 (02 Apr 2019 14:30) (118/93 - 165/85)  BP(mean): 102 (02 Apr 2019 14:30) (99 - 111)  RR: 17 (02 Apr 2019 14:30) (16 - 28)  SpO2: 100% (02 Apr 2019 14:30) (96% - 100%)    PHYSICAL EXAM:  GENERAL: NAD,   HEAD:  Atraumatic, Normocephalic  EYES: EOMI, PERRLA, conjunctiva and sclera clear  ENMT: No tonsillar erythema, exudates, or enlargement; Moist mucous membranes, No lesions  NECK: Supple, No JVD, Normal thyroid  NERVOUS SYSTEM:    Sedated; Motor Strength 4/5   CHEST/LUNG: Clear bilaterally; No rales, rhonchi, wheezing, or rubs  HEART: Regular rate and rhythm; No murmurs, rubs, or gallops  ABDOMEN: Soft, Nontender, Nondistended; Bowel sounds present  EXTREMITIES:  2+ Peripheral Pulses, No clubbing, cyanosis, or edema  LYMPH: No lymphadenopathy noted  SKIN: No rashes or lesions    LABS:                        14.4   6.33  )-----------( 109      ( 02 Apr 2019 09:25 )             44.9     04-02    142  |  110<H>  |  6<L>  ----------------------------<  166<H>  3.9   |  25  |  0.78    Ca    8.4<L>      02 Apr 2019 12:37  Phos  3.4     04-02  Mg     2.1     04-02    TPro  6.8  /  Alb  3.4  /  TBili  1.0  /  DBili  x   /  AST  48<H>  /  ALT  32  /  AlkPhos  43  04-02        CAPILLARY BLOOD GLUCOSE              RADIOLOGY & ADDITIONAL TESTS:        Consultant(s) Notes Reviewed:  [xxx ] YES  [ ] NO
Patient is a 53y old  Male who presents with a chief complaint of Vomiting (08 Apr 2019 10:34)      SUBJECTIVE/OBJECTIVE:    IN CCU  Sedated    MEDICATIONS  (STANDING):  ampicillin/sulbactam  IVPB 3 Gram(s) IV Intermittent every 6 hours  dexmedetomidine Infusion 0.2 MICROgram(s)/kG/Hr (3.72 mL/Hr) IV Continuous <Continuous>  dextrose 5% + sodium chloride 0.45%. 1000 milliLiter(s) (80 mL/Hr) IV Continuous <Continuous>  enoxaparin Injectable 40 milliGRAM(s) SubCutaneous every 24 hours  folic acid 1 milliGRAM(s) Oral daily  LORazepam   Injectable 2 milliGRAM(s) IV Push every 4 hours  LORazepam   Injectable   IV Push   multivitamin 1 Tablet(s) Oral daily  pantoprazole  Injectable 40 milliGRAM(s) IV Push every 12 hours  PHENobarbital Injectable 260 milliGRAM(s) IV Push once  potassium chloride   Powder 40 milliEquivalent(s) Oral daily  sucralfate suspension 1 Gram(s) Oral four times a day    MEDICATIONS  (PRN):  acetaminophen   Tablet .. 650 milliGRAM(s) Oral every 6 hours PRN Temp greater or equal to 38C (100.4F), Mild Pain (1 - 3)  ondansetron Injectable 4 milliGRAM(s) IV Push every 6 hours PRN Nausea and/or Vomiting  PHENobarbital Injectable 130 milliGRAM(s) IV Push every 1 hour PRN CIWA >8      Allergies    No Known Allergies    Intolerances          Vital Signs Last 24 Hrs  T(C): 38.3 (08 Apr 2019 08:00), Max: 38.4 (07 Apr 2019 16:10)  T(F): 100.9 (08 Apr 2019 08:00), Max: 101.1 (07 Apr 2019 16:10)  HR: 93 (08 Apr 2019 11:00) (79 - 105)  BP: 121/75 (08 Apr 2019 11:00) (104/62 - 132/80)  BP(mean): 87 (08 Apr 2019 11:00) (74 - 97)  RR: 21 (08 Apr 2019 11:00) (15 - 29)  SpO2: 93% (08 Apr 2019 11:00) (93% - 100%)    PHYSICAL EXAM:  GENERAL: NAD,   HEAD:  Atraumatic, Normocephalic  EYES: EOMI, PERRLA, conjunctiva and sclera clear  ENMT: No tonsillar erythema, exudates, or enlargement; Moist mucous membranes, No lesions  NECK: Supple, No JVD, Normal thyroid  NERVOUS SYSTEM:  Sedated ; Motor Strength 4/5   CHEST/LUNG: Clear bilaterally; No rales, rhonchi, wheezing, or rubs  HEART: Regular rate and rhythm; No murmurs, rubs, or gallops  ABDOMEN: Soft, Nontender, Nondistended; Bowel sounds present  EXTREMITIES:  2+ Peripheral Pulses, No clubbing, cyanosis, or edema  LYMPH: No lymphadenopathy noted  SKIN: No rashes or lesions    LABS:                        11.5   8.56  )-----------( 266      ( 08 Apr 2019 03:59 )             35.2     04-08    140  |  106  |  5<L>  ----------------------------<  95  3.0<L>   |  24  |  0.70    Ca    8.0<L>      08 Apr 2019 03:59  Phos  3.1     04-08  Mg     1.7     04-08          CAPILLARY BLOOD GLUCOSE              RADIOLOGY & ADDITIONAL TESTS:  < from: Xray Chest 1 View- PORTABLE-Routine (04.07.19 @ 10:30) >    EXAM:  XR CHEST PORTABLE ROUTINE 1V                            PROCEDURE DATE:  04/07/2019          INTERPRETATION:  Clinical Information: ETOH withdrawl    Technique: AP chest image.     Comparison: 04/05/2019    Findings/  Impression: NG tip in the stomach. The heart is unremarkable. The lungs   are clear. Bones are unremarkable for age.    < end of copied text >        Consultant(s) Notes Reviewed:  [xxx ] YES  [ ] NO
Patient is a 53y old  Male who presents with a chief complaint of Vomiting (09 Apr 2019 09:57)      SUBJECTIVE/OBJECTIVE:      Out of CCU   Patient resting comfortably.     MEDICATIONS  (STANDING):  ampicillin/sulbactam  IVPB 3 Gram(s) IV Intermittent every 6 hours  dextrose 5% + sodium chloride 0.45%. 1000 milliLiter(s) (80 mL/Hr) IV Continuous <Continuous>  enoxaparin Injectable 40 milliGRAM(s) SubCutaneous every 24 hours  folic acid 1 milliGRAM(s) Oral daily  glycopyrrolate 1 milliGRAM(s) Oral two times a day  LORazepam   Injectable   IV Push   LORazepam   Injectable 1 milliGRAM(s) IV Push every 12 hours  multivitamin 1 Tablet(s) Oral daily  pantoprazole  Injectable 40 milliGRAM(s) IV Push every 12 hours  PHENobarbital Injectable 260 milliGRAM(s) IV Push once  potassium acid phosphate/sodium acid phosphate tablet (K-PHOS No. 2) 1 Tablet(s) Oral four times a day with meals  potassium chloride    Tablet ER 40 milliEquivalent(s) Oral once  potassium chloride   Powder 40 milliEquivalent(s) Oral daily  sucralfate suspension 1 Gram(s) Oral four times a day    MEDICATIONS  (PRN):  acetaminophen   Tablet .. 650 milliGRAM(s) Oral every 6 hours PRN Temp greater or equal to 38C (100.4F), Mild Pain (1 - 3)  LORazepam   Injectable 2 milliGRAM(s) IV Push every 4 hours PRN CIWA>8  ondansetron Injectable 4 milliGRAM(s) IV Push every 6 hours PRN Nausea and/or Vomiting      Allergies    No Known Allergies    Intolerances          Vital Signs Last 24 Hrs  T(C): 38.1 (10 Apr 2019 04:02), Max: 38.1 (10 Apr 2019 04:02)  T(F): 100.6 (10 Apr 2019 04:02), Max: 100.6 (10 Apr 2019 04:02)  HR: 74 (10 Apr 2019 07:01) (74 - 118)  BP: 110/72 (10 Apr 2019 07:01) (110/72 - 157/112)  BP(mean): 102 (10 Apr 2019 03:48) (96 - 126)  RR: 14 (10 Apr 2019 07:01) (13 - 28)  SpO2: 94% (10 Apr 2019 07:01) (85% - 100%)    PHYSICAL EXAM:  GENERAL: NAD, well-groomed, well-developed  HEAD:  Atraumatic, Normocephalic  EYES: EOMI, PERRLA, conjunctiva and sclera clear  ENMT: No tonsillar erythema, exudates, or enlargement; Moist mucous membranes, No lesions  NECK: Supple, No JVD, Normal thyroid  NERVOUS SYSTEM:  Alert & Oriented X3; Motor Strength 5/5 B/L upper and lower extremities; DTRs 2+ intact and symmetric  CHEST/LUNG: Clear bilaterally; No rales, rhonchi, wheezing, or rubs  HEART: Regular rate and rhythm; No murmurs, rubs, or gallops  ABDOMEN: Soft, Nontender, Nondistended; Bowel sounds present  EXTREMITIES:  2+ Peripheral Pulses, No clubbing, cyanosis, or edema  LYMPH: No lymphadenopathy noted  SKIN: No rashes or lesions    LABS:                        11.4   5.75  )-----------( 410      ( 10 Apr 2019 04:10 )             35.5     04-10    140  |  106  |  4<L>  ----------------------------<  101<H>  3.3<L>   |  27  |  0.74    Ca    8.6      10 Apr 2019 04:10  Phos  2.4     04-10  Mg     2.4     04-10          CAPILLARY BLOOD GLUCOSE              RADIOLOGY & ADDITIONAL TESTS:        Consultant(s) Notes Reviewed:  [ ] YES  [ ] NO
Patient is a 53y old  Male who presents with a chief complaint of Vomiting (10 Apr 2019 09:28)      SUBJECTIVE/OBJECTIVE:    Awake, alert      MEDICATIONS  (STANDING):  ampicillin/sulbactam  IVPB 3 Gram(s) IV Intermittent every 6 hours  dextrose 5% + sodium chloride 0.45%. 1000 milliLiter(s) (80 mL/Hr) IV Continuous <Continuous>  enoxaparin Injectable 40 milliGRAM(s) SubCutaneous every 24 hours  folic acid 1 milliGRAM(s) Oral daily  LORazepam   Injectable   IV Push   LORazepam   Injectable 0.5 milliGRAM(s) IV Push every 12 hours  multivitamin 1 Tablet(s) Oral daily  pantoprazole  Injectable 40 milliGRAM(s) IV Push every 12 hours  PHENobarbital Injectable 260 milliGRAM(s) IV Push once  potassium chloride   Powder 40 milliEquivalent(s) Oral daily  risperiDONE   Tablet 0.25 milliGRAM(s) Oral two times a day  sucralfate suspension 1 Gram(s) Oral four times a day    MEDICATIONS  (PRN):  acetaminophen   Tablet .. 650 milliGRAM(s) Oral every 6 hours PRN Temp greater or equal to 38C (100.4F), Mild Pain (1 - 3)  LORazepam   Injectable 2 milliGRAM(s) IV Push every 4 hours PRN CIWA>8  ondansetron Injectable 4 milliGRAM(s) IV Push every 6 hours PRN Nausea and/or Vomiting      Allergies    No Known Allergies    Intolerances          Vital Signs Last 24 Hrs  T(C): 36.7 (11 Apr 2019 10:37), Max: 37.3 (10 Apr 2019 19:31)  T(F): 98.1 (11 Apr 2019 10:37), Max: 99.2 (10 Apr 2019 19:31)  HR: 83 (11 Apr 2019 10:37) (83 - 98)  BP: 129/80 (11 Apr 2019 10:37) (126/63 - 146/96)  BP(mean): 120 (10 Apr 2019 19:31) (120 - 120)  RR: 17 (11 Apr 2019 10:37) (17 - 17)  SpO2: 96% (11 Apr 2019 10:37) (95% - 96%)    PHYSICAL EXAM:  GENERAL: NAD, well-groomed, well-developed  HEAD:  Atraumatic, Normocephalic  EYES: EOMI, PERRLA, conjunctiva and sclera clear  ENMT: No tonsillar erythema, exudates, or enlargement; Moist mucous membranes, No lesions  NECK: Supple, No JVD, Normal thyroid  NERVOUS SYSTEM:  Alert;  Motor Strength 4/5 B/L; No tremor  CHEST/LUNG: Clear bilaterally; No rales, rhonchi, wheezing, or rubs  HEART: Regular rate and rhythm; No murmurs, rubs, or gallops  ABDOMEN: Soft, Nontender, Nondistended; Bowel sounds present  EXTREMITIES:  2+ Peripheral Pulses, No clubbing, cyanosis, or edema  LYMPH: No lymphadenopathy noted  SKIN: No rashes or lesions    LABS:                        12.3   5.85  )-----------( 307      ( 11 Apr 2019 08:40 )             38.0     04-11    138  |  105  |  7   ----------------------------<  108<H>  3.7   |  24  |  0.73    Ca    9.2      11 Apr 2019 08:40  Phos  2.4     04-10  Mg     2.4     04-10          CAPILLARY BLOOD GLUCOSE              RADIOLOGY & ADDITIONAL TESTS:        Consultant(s) Notes Reviewed:  [ ] YES  [ ] NO
Patient is a 53y old  Male who presents with a chief complaint of Vomiting (11 Apr 2019 12:46)      SUBJECTIVE/OBJECTIVE:    Awake, alert  Still restless at night     MEDICATIONS  (STANDING):  ampicillin/sulbactam  IVPB 3 Gram(s) IV Intermittent every 6 hours  dextrose 5% + sodium chloride 0.45%. 1000 milliLiter(s) (80 mL/Hr) IV Continuous <Continuous>  enoxaparin Injectable 40 milliGRAM(s) SubCutaneous every 24 hours  folic acid 1 milliGRAM(s) Oral daily  multivitamin 1 Tablet(s) Oral daily  pantoprazole  Injectable 40 milliGRAM(s) IV Push every 12 hours  PHENobarbital Injectable 260 milliGRAM(s) IV Push once  potassium chloride    Tablet ER 40 milliEquivalent(s) Oral every 4 hours  risperiDONE   Tablet 1 milliGRAM(s) Oral two times a day  sucralfate suspension 1 Gram(s) Oral four times a day    MEDICATIONS  (PRN):  acetaminophen   Tablet .. 650 milliGRAM(s) Oral every 6 hours PRN Temp greater or equal to 38C (100.4F), Mild Pain (1 - 3)  LORazepam   Injectable 2 milliGRAM(s) IV Push every 4 hours PRN CIWA>8  ondansetron Injectable 4 milliGRAM(s) IV Push every 6 hours PRN Nausea and/or Vomiting      Allergies    No Known Allergies    Intolerances          Vital Signs Last 24 Hrs  T(C): 36.8 (12 Apr 2019 10:53), Max: 37.1 (12 Apr 2019 04:49)  T(F): 98.2 (12 Apr 2019 10:53), Max: 98.7 (12 Apr 2019 04:49)  HR: 70 (12 Apr 2019 11:51) (64 - 85)  BP: 126/70 (12 Apr 2019 11:51) (107/57 - 149/89)  BP(mean): --  RR: 16 (12 Apr 2019 11:51) (16 - 17)  SpO2: 98% (12 Apr 2019 11:51) (97% - 98%)    PHYSICAL EXAM:  GENERAL: NAD, well-groomed, well-developed  HEAD:  Atraumatic, Normocephalic  EYES: EOMI, PERRLA, conjunctiva and sclera clear  ENMT: No tonsillar erythema, exudates, or enlargement; Moist mucous membranes, No lesions  NECK: Supple, No JVD, Normal thyroid  NERVOUS SYSTEM:  Alert;  Motor Strength 4/5 B/L; No tremor  CHEST/LUNG: Clear bilaterally; No rales, rhonchi, wheezing, or rubs  HEART: Regular rate and rhythm; No murmurs, rubs, or gallops  ABDOMEN: Soft, Nontender, Nondistended; Bowel sounds present  EXTREMITIES:  2+ Peripheral Pulses, No clubbing, cyanosis, or edema  LYMPH: No lymphadenopathy noted  SKIN: No rashes or lesions          LABS:                        12.3   4.90  )-----------( 564      ( 12 Apr 2019 07:55 )             38.2     04-12    140  |  106  |  7   ----------------------------<  110<H>  3.3<L>   |  25  |  0.76    Ca    9.0      12 Apr 2019 07:55    TPro  7.9  /  Alb  3.0<L>  /  TBili  0.3  /  DBili  x   /  AST  23  /  ALT  28  /  AlkPhos  40  04-12        CAPILLARY BLOOD GLUCOSE              RADIOLOGY & ADDITIONAL TESTS:        Consultant(s) Notes Reviewed:  [ ] YES  [ ] NO
INTERVAL HPI/OVERNIGHT EVENTS:   HPI:  52 yo M with h/o Alcohol Use, PUD presents with  vomiting.   States  been vomiting all night, admits to drinking earlier, last drink yesterday, 1 pt/day.   Notes red blood in vomitus, associated with  epigastric pain. Denies CP, SOB, cough, palpitation, diarrhea , fever, chills, weakness, dysuria. (31 Mar 2019 21:37)    Still with fevers and delirium, significant secretion requiring hourly suctioning         PAST MEDICAL & SURGICAL HISTORY:  EtOH dependence  Mixed hyperlipidemia  PUD (peptic ulcer disease)  No significant past surgical history           ICU Vital Signs Last 24 Hrs  T(C): 38.3 (08 Apr 2019 08:00), Max: 38.8 (07 Apr 2019 11:00)  T(F): 100.9 (08 Apr 2019 08:00), Max: 101.8 (07 Apr 2019 11:00)  HR: 94 (08 Apr 2019 09:00) (79 - 105)  BP: 115/78 (08 Apr 2019 09:00) (104/62 - 132/80)  BP(mean): 90 (08 Apr 2019 09:00) (74 - 97)  ABP: --  ABP(mean): --  RR: 22 (08 Apr 2019 09:00) (15 - 29)  SpO2: 100% (08 Apr 2019 09:00) (94% - 100%)          I&O's Detail    07 Apr 2019 07:01  -  08 Apr 2019 07:00  --------------------------------------------------------  IN:    dexmedetomidine Infusion: 142.6 mL    dextrose 5% + sodium chloride 0.45%.: 1840 mL    Enteral Tube Flush: 120 mL    IV PiggyBack: 650 mL    Solution: 400 mL  Total IN: 3152.6 mL    OUT:    Voided: 550 mL  Total OUT: 550 mL    Total NET: 2602.6 mL      08 Apr 2019 07:01  -  08 Apr 2019 10:35  --------------------------------------------------------  IN:    dexmedetomidine Infusion: 12.4 mL    dextrose 5% + sodium chloride 0.45%.: 240 mL    Solution: 100 mL  Total IN: 352.4 mL    OUT:  Total OUT: 0 mL    Total NET: 352.4 mL            CAPILLARY BLOOD GLUCOSE        PHYSICAL EXAM:    GENERAL:  hypoactive delirium,  HEENT No JVD  Lungs Good B/L air entry  CVS S1 S2 RR  ABD Nt/ND      LABS:                        11.5   8.56  )-----------( 266      ( 08 Apr 2019 03:59 )             35.2      04-08    140  |  106  |  5<L>  ----------------------------<  95  3.0<L>   |  24  |  0.70    Ca    8.0<L>      08 Apr 2019 03:59  Phos  3.1     04-08  Mg     1.7     04-08          Culture Results:   No growth to date. (04-05 @ 13:11)      RADIOLOGY & ADDITIONAL STUDIES:  < from: Xray Chest 1 View- PORTABLE-Routine (04.07.19 @ 10:30) >  Findings/  Impression: NG tip in the stomach. The heart is unremarkable. The lungs   are clear. Bones are unremarkable for age.      < end of copied text >      Assessment and Plan:    CRITICAL CARE TIME SPENT:

## 2019-04-12 NOTE — PROCEDURE NOTE - NSPROCDETAILS_GEN_ALL_CORE
flushes easily/secured in place/ultrasound utilization
flushes easily/blood seen on insertion/dressing applied/secured in place/ultrasound utilization

## 2019-04-12 NOTE — PROGRESS NOTE ADULT - ASSESSMENT
Admitted for PUD, Alcohol Withdrawal
53 year old of male with pmhx of alcohol abuse, p/w vomiting admitted to medicine floor for gastritis, code gray with CIWA of 21 after 3x 260mg of phenobarbitol transfer to critical care for severe agitation with alchol withdrawal.   -CIWA as per protocol  -precedex drip to RASS 0 to -1  -phenobarbitol 130mg every 6h  -phenobarb 130mg every 1hour for CIWA >8  -phenobarb 260mg every 15min for CIWA >18  -thiamine/folic acid/ MV    Attending Time Spent 35 minutes
53 year old of male with pmhx of alcohol abuse, p/w vomiting admitted to medicine floor for gastritis, code gray with CIWA of 21 after 3x 260mg of phenobarbitol transfer to critical care for severe agitation with alchol withdrawal.   Neuro:   - CIWA as per protocol  - precedex drip to RASS 0 to -1  - phenobarbitol 130mg every 6h  - phenobarb 130mg every 1hour for CIWA >8  - phenobarb 260mg every 15min for CIWA >18  - thiamine/folic acid/ MV  CV: MICHELLE  Pulm: MICHELLE  GI: Reg diet   Renal/Metabolic:   ID: MICHELLE  Dispo: Remains agitated with intermittently elevated CIWAs
53 year old of male with pmhx of alcohol abuse, p/w vomiting admitted to medicine floor for gastritis, code gray with CIWA of 21 after 3x 260mg of phenobarbitol transfer to critical care for severe agitation with alchol withdrawal.   Neuro:   - CIWA as per protocol  - precedex drip to RASS 0 to -1  - phenobarbitol 130mg every 6h  - phenobarb 130mg every 1hour for CIWA >8  - phenobarb 260mg every 15min for CIWA >18  - thiamine/folic acid/ MV  CV: MICHELLE  Pulm: MICHELLE  GI: Reg diet   Renal/Metabolic:   ID: MICHELLE  Dispo: Remains agitated with intermittently elevated CIWAs    Attending Time Spent 35 minutes
53 year old of male with pmhx of alcohol abuse, p/w vomiting admitted to medicine floor for gastritis, code gray with CIWA of 21 after 3x 260mg of phenobarbitol transfer to critical care for severe agitation with alchol withdrawal.   Neuro:   - CIWA as per protocol  - precedex drip to RASS 0 to -1  - phenobarbitol 130mg every 6h  - phenobarb 130mg every 1hour for CIWA >8  - phenobarb 260mg every 15min for CIWA >18  - thiamine/folic acid/ MV  CV: MICHELLE  Pulm: MICHELLE  GI: Reg diet   Renal/Metabolic:   ID: MICHELLE  Dispo: Remains agitated with intermittently elevated CIWAs    Attending Time Spent 35 minutes
Admitted for PUD, Alcohol Withdrawal

## 2019-04-12 NOTE — PROGRESS NOTE ADULT - ATTENDING COMMENTS
Continue current care and treatment.
Management per CCU  CC noted;   : Delirium tremens.  Plan: still on precedex - trial to wean off  ativan taper- on 2 mg IV Q   C/W unasyn for tracheobronchitis, no infiltrates on CXR  lovenox for DVT PPX.     Continue current care and treatment.
Management per ICU
Continue current care and treatment.
PT  Continue current care and treatment.
Risperdal added  Continue current care and treatment.

## 2019-04-12 NOTE — PHYSICAL THERAPY INITIAL EVALUATION ADULT - BALANCE TRAINING, PT EVAL
GOAL: pt will be able to sit at edge of bed while performing UE manipulation without loss of balance within 4 weeks. pt will be able to independently ambulate 150ft without assistive device without loss of balance within 4 weeks

## 2019-04-12 NOTE — PHYSICAL THERAPY INITIAL EVALUATION ADULT - ADDITIONAL COMMENTS
Per son report (Lavelle): pt lives with family in a private home, 2 entry steps (+ rail), pt stays on main floor. Prior to admission pt was independent with all functional mobility including household ambulation without a device. Son denies any DMEs in home. Pt was performing ADL's independently prior to admission. Son states family can provide total assist, refusing rehab placement. Goal of therapy: return home.

## 2019-04-12 NOTE — PROGRESS NOTE ADULT - PROVIDER SPECIALTY LIST ADULT
Critical Care
Internal Medicine
Critical Care
Internal Medicine

## 2019-04-12 NOTE — PHYSICAL THERAPY INITIAL EVALUATION ADULT - TRANSFER SAFETY CONCERNS NOTED: SIT/STAND, REHAB EVAL
decreased step length/decreased safety awareness/decreased weight-shifting ability/decreased sequencing ability

## 2019-04-13 ENCOUNTER — TRANSCRIPTION ENCOUNTER (OUTPATIENT)
Age: 54
End: 2019-04-13

## 2019-04-13 VITALS
DIASTOLIC BLOOD PRESSURE: 70 MMHG | HEART RATE: 85 BPM | RESPIRATION RATE: 16 BRPM | SYSTOLIC BLOOD PRESSURE: 104 MMHG | OXYGEN SATURATION: 96 % | TEMPERATURE: 97 F

## 2019-04-13 LAB
ALBUMIN SERPL ELPH-MCNC: 3 G/DL — LOW (ref 3.3–5)
ALP SERPL-CCNC: 40 U/L — SIGNIFICANT CHANGE UP (ref 40–120)
ALT FLD-CCNC: 52 U/L — SIGNIFICANT CHANGE UP (ref 12–78)
ANION GAP SERPL CALC-SCNC: 11 MMOL/L — SIGNIFICANT CHANGE UP (ref 5–17)
AST SERPL-CCNC: 34 U/L — SIGNIFICANT CHANGE UP (ref 15–37)
BILIRUB SERPL-MCNC: 0.2 MG/DL — SIGNIFICANT CHANGE UP (ref 0.2–1.2)
BUN SERPL-MCNC: 6 MG/DL — LOW (ref 7–23)
CALCIUM SERPL-MCNC: 9.3 MG/DL — SIGNIFICANT CHANGE UP (ref 8.5–10.1)
CHLORIDE SERPL-SCNC: 106 MMOL/L — SIGNIFICANT CHANGE UP (ref 96–108)
CO2 SERPL-SCNC: 22 MMOL/L — SIGNIFICANT CHANGE UP (ref 22–31)
CREAT SERPL-MCNC: 0.75 MG/DL — SIGNIFICANT CHANGE UP (ref 0.5–1.3)
GLUCOSE SERPL-MCNC: 85 MG/DL — SIGNIFICANT CHANGE UP (ref 70–99)
POTASSIUM SERPL-MCNC: 3.6 MMOL/L — SIGNIFICANT CHANGE UP (ref 3.5–5.3)
POTASSIUM SERPL-SCNC: 3.6 MMOL/L — SIGNIFICANT CHANGE UP (ref 3.5–5.3)
PROT SERPL-MCNC: 7.9 GM/DL — SIGNIFICANT CHANGE UP (ref 6–8.3)
SODIUM SERPL-SCNC: 139 MMOL/L — SIGNIFICANT CHANGE UP (ref 135–145)

## 2019-04-13 RX ORDER — PANTOPRAZOLE SODIUM 20 MG/1
1 TABLET, DELAYED RELEASE ORAL
Qty: 30 | Refills: 0
Start: 2019-04-13 | End: 2019-05-12

## 2019-04-13 RX ORDER — THIAMINE MONONITRATE (VIT B1) 100 MG
1 TABLET ORAL
Qty: 90 | Refills: 0
Start: 2019-04-13 | End: 2019-07-11

## 2019-04-13 RX ORDER — RISPERIDONE 4 MG/1
1 TABLET ORAL
Qty: 60 | Refills: 0
Start: 2019-04-13 | End: 2019-05-12

## 2019-04-13 RX ORDER — FOLIC ACID 0.8 MG
1 TABLET ORAL
Qty: 90 | Refills: 0
Start: 2019-04-13 | End: 2019-07-11

## 2019-04-13 RX ORDER — SUCRALFATE 1 G
1 TABLET ORAL
Qty: 120 | Refills: 0
Start: 2019-04-13 | End: 2019-05-12

## 2019-04-13 RX ADMIN — Medication 1 MILLIGRAM(S): at 11:43

## 2019-04-13 RX ADMIN — PANTOPRAZOLE SODIUM 40 MILLIGRAM(S): 20 TABLET, DELAYED RELEASE ORAL at 05:18

## 2019-04-13 RX ADMIN — RISPERIDONE 1 MILLIGRAM(S): 4 TABLET ORAL at 05:18

## 2019-04-13 RX ADMIN — AMPICILLIN SODIUM AND SULBACTAM SODIUM 200 GRAM(S): 250; 125 INJECTION, POWDER, FOR SUSPENSION INTRAMUSCULAR; INTRAVENOUS at 01:00

## 2019-04-13 RX ADMIN — Medication 1 TABLET(S): at 11:43

## 2019-04-13 RX ADMIN — AMPICILLIN SODIUM AND SULBACTAM SODIUM 200 GRAM(S): 250; 125 INJECTION, POWDER, FOR SUSPENSION INTRAMUSCULAR; INTRAVENOUS at 08:20

## 2019-04-13 RX ADMIN — Medication 1 GRAM(S): at 05:18

## 2019-04-13 RX ADMIN — Medication 1 GRAM(S): at 11:44

## 2019-04-13 NOTE — DISCHARGE NOTE PROVIDER - NSDCCPCAREPLAN_GEN_ALL_CORE_FT
PRINCIPAL DISCHARGE DIAGNOSIS  Diagnosis: Withdrawal symptoms, alcohol, with delirium  Assessment and Plan of Treatment: Resolved  Stop drinking  AA      SECONDARY DISCHARGE DIAGNOSES  Diagnosis: Alcoholic gastritis  Assessment and Plan of Treatment: PPI  Stop drinking  AA    Diagnosis: Severe alcohol dependence  Assessment and Plan of Treatment: AA  Stop drinking    Diagnosis: Tracheobronchitis  Assessment and Plan of Treatment: po Abx.

## 2019-04-13 NOTE — DISCHARGE NOTE PROVIDER - HOSPITAL COURSE
54 yo M with h/o Alcohol Use, PUD presents with  vomiting.   States  been vomiting all night, admits to drinking earlier, last drink yesterday, 1 pt/day.   Notes red blood in vomitus, associated with  epigastric pain. Denies CP, SOB, cough, palpitation, diarrhea , fever, chills, weakness, dysuria.     Admitted for Alcohol Withdrawal, Gastritis, received IVF, IV Protonix, Ativan protocol, course complicated with CWA 21.  Patient transferred to ICU    for DT's treated with IV sedation and phenobarb with improvement.  After stabilization returned to floor, Ativan protocol completed and patient     discharged home.

## 2019-04-13 NOTE — DISCHARGE NOTE NURSING/CASE MANAGEMENT/SOCIAL WORK - NSDCDPATPORTLINK_GEN_ALL_CORE
You can access the [facility] Patient Portal, offered by [facility], by registering with the following website: [email or web address]

## 2019-04-16 DIAGNOSIS — J69.0 PNEUMONITIS DUE TO INHALATION OF FOOD AND VOMIT: ICD-10-CM

## 2019-04-16 DIAGNOSIS — K29.21 ALCOHOLIC GASTRITIS WITH BLEEDING: ICD-10-CM

## 2019-04-16 DIAGNOSIS — K21.9 GASTRO-ESOPHAGEAL REFLUX DISEASE WITHOUT ESOPHAGITIS: ICD-10-CM

## 2019-04-16 DIAGNOSIS — F10.231 ALCOHOL DEPENDENCE WITH WITHDRAWAL DELIRIUM: ICD-10-CM

## 2019-04-16 DIAGNOSIS — K92.0 HEMATEMESIS: ICD-10-CM

## 2019-04-16 DIAGNOSIS — J40 BRONCHITIS, NOT SPECIFIED AS ACUTE OR CHRONIC: ICD-10-CM

## 2019-04-16 DIAGNOSIS — E44.0 MODERATE PROTEIN-CALORIE MALNUTRITION: ICD-10-CM

## 2019-04-16 DIAGNOSIS — E78.5 HYPERLIPIDEMIA, UNSPECIFIED: ICD-10-CM

## 2019-04-16 DIAGNOSIS — K27.9 PEPTIC ULCER, SITE UNSPECIFIED, UNSPECIFIED AS ACUTE OR CHRONIC, WITHOUT HEMORRHAGE OR PERFORATION: ICD-10-CM

## 2019-05-01 ENCOUNTER — OUTPATIENT (OUTPATIENT)
Dept: OUTPATIENT SERVICES | Facility: HOSPITAL | Age: 54
LOS: 1 days | End: 2019-05-01
Payer: MEDICAID

## 2019-05-01 PROCEDURE — G9001: CPT

## 2019-05-23 DIAGNOSIS — Z71.89 OTHER SPECIFIED COUNSELING: ICD-10-CM

## 2019-06-05 ENCOUNTER — INPATIENT (INPATIENT)
Facility: HOSPITAL | Age: 54
LOS: 1 days | Discharge: LEFT BEFORE DISCHARGE | End: 2019-06-07
Attending: INTERNAL MEDICINE | Admitting: INTERNAL MEDICINE
Payer: MEDICAID

## 2019-06-05 VITALS
SYSTOLIC BLOOD PRESSURE: 131 MMHG | OXYGEN SATURATION: 97 % | HEIGHT: 67 IN | TEMPERATURE: 98 F | HEART RATE: 127 BPM | WEIGHT: 160.06 LBS | DIASTOLIC BLOOD PRESSURE: 100 MMHG | RESPIRATION RATE: 20 BRPM

## 2019-06-05 LAB
ALBUMIN SERPL ELPH-MCNC: 4.4 G/DL — SIGNIFICANT CHANGE UP (ref 3.3–5)
ALP SERPL-CCNC: 61 U/L — SIGNIFICANT CHANGE UP (ref 40–120)
ALT FLD-CCNC: 42 U/L — SIGNIFICANT CHANGE UP (ref 12–78)
ANION GAP SERPL CALC-SCNC: 19 MMOL/L — HIGH (ref 5–17)
APTT BLD: 28 SEC — SIGNIFICANT CHANGE UP (ref 27.5–36.3)
AST SERPL-CCNC: 38 U/L — HIGH (ref 15–37)
BASOPHILS # BLD AUTO: 0.06 K/UL — SIGNIFICANT CHANGE UP (ref 0–0.2)
BASOPHILS NFR BLD AUTO: 0.8 % — SIGNIFICANT CHANGE UP (ref 0–2)
BILIRUB SERPL-MCNC: 0.6 MG/DL — SIGNIFICANT CHANGE UP (ref 0.2–1.2)
BUN SERPL-MCNC: 11 MG/DL — SIGNIFICANT CHANGE UP (ref 7–23)
CALCIUM SERPL-MCNC: 9.2 MG/DL — SIGNIFICANT CHANGE UP (ref 8.5–10.1)
CHLORIDE SERPL-SCNC: 95 MMOL/L — LOW (ref 96–108)
CO2 SERPL-SCNC: 25 MMOL/L — SIGNIFICANT CHANGE UP (ref 22–31)
CREAT SERPL-MCNC: 1.01 MG/DL — SIGNIFICANT CHANGE UP (ref 0.5–1.3)
EOSINOPHIL # BLD AUTO: 0.01 K/UL — SIGNIFICANT CHANGE UP (ref 0–0.5)
EOSINOPHIL NFR BLD AUTO: 0.1 % — SIGNIFICANT CHANGE UP (ref 0–6)
ETHANOL SERPL-MCNC: 316 MG/DL — SIGNIFICANT CHANGE UP (ref 0–10)
GLUCOSE SERPL-MCNC: 114 MG/DL — HIGH (ref 70–99)
HCT VFR BLD CALC: 48.9 % — SIGNIFICANT CHANGE UP (ref 39–50)
HGB BLD-MCNC: 16.1 G/DL — SIGNIFICANT CHANGE UP (ref 13–17)
IMM GRANULOCYTES NFR BLD AUTO: 0.4 % — SIGNIFICANT CHANGE UP (ref 0–1.5)
INR BLD: 0.95 RATIO — SIGNIFICANT CHANGE UP (ref 0.88–1.16)
LACTATE SERPL-SCNC: 6.9 MMOL/L — CRITICAL HIGH (ref 0.7–2)
LIDOCAIN IGE QN: 269 U/L — SIGNIFICANT CHANGE UP (ref 73–393)
LYMPHOCYTES # BLD AUTO: 2.16 K/UL — SIGNIFICANT CHANGE UP (ref 1–3.3)
LYMPHOCYTES # BLD AUTO: 28.2 % — SIGNIFICANT CHANGE UP (ref 13–44)
MAGNESIUM SERPL-MCNC: 2 MG/DL — SIGNIFICANT CHANGE UP (ref 1.6–2.6)
MCHC RBC-ENTMCNC: 27.3 PG — SIGNIFICANT CHANGE UP (ref 27–34)
MCHC RBC-ENTMCNC: 32.9 GM/DL — SIGNIFICANT CHANGE UP (ref 32–36)
MCV RBC AUTO: 82.9 FL — SIGNIFICANT CHANGE UP (ref 80–100)
MONOCYTES # BLD AUTO: 0.39 K/UL — SIGNIFICANT CHANGE UP (ref 0–0.9)
MONOCYTES NFR BLD AUTO: 5.1 % — SIGNIFICANT CHANGE UP (ref 2–14)
NEUTROPHILS # BLD AUTO: 5.02 K/UL — SIGNIFICANT CHANGE UP (ref 1.8–7.4)
NEUTROPHILS NFR BLD AUTO: 65.4 % — SIGNIFICANT CHANGE UP (ref 43–77)
NRBC # BLD: 0 /100 WBCS — SIGNIFICANT CHANGE UP (ref 0–0)
PLATELET # BLD AUTO: 267 K/UL — SIGNIFICANT CHANGE UP (ref 150–400)
POTASSIUM SERPL-MCNC: 3.3 MMOL/L — LOW (ref 3.5–5.3)
POTASSIUM SERPL-SCNC: 3.3 MMOL/L — LOW (ref 3.5–5.3)
PROT SERPL-MCNC: 9.4 GM/DL — HIGH (ref 6–8.3)
PROTHROM AB SERPL-ACNC: 10.6 SEC — SIGNIFICANT CHANGE UP (ref 10–12.9)
RBC # BLD: 5.9 M/UL — HIGH (ref 4.2–5.8)
RBC # FLD: 13.7 % — SIGNIFICANT CHANGE UP (ref 10.3–14.5)
SODIUM SERPL-SCNC: 139 MMOL/L — SIGNIFICANT CHANGE UP (ref 135–145)
WBC # BLD: 7.67 K/UL — SIGNIFICANT CHANGE UP (ref 3.8–10.5)
WBC # FLD AUTO: 7.67 K/UL — SIGNIFICANT CHANGE UP (ref 3.8–10.5)

## 2019-06-05 PROCEDURE — 71045 X-RAY EXAM CHEST 1 VIEW: CPT | Mod: 26

## 2019-06-05 PROCEDURE — 74177 CT ABD & PELVIS W/CONTRAST: CPT | Mod: 26

## 2019-06-05 PROCEDURE — 99285 EMERGENCY DEPT VISIT HI MDM: CPT

## 2019-06-05 RX ORDER — SODIUM CHLORIDE 9 MG/ML
1000 INJECTION INTRAMUSCULAR; INTRAVENOUS; SUBCUTANEOUS ONCE
Refills: 0 | Status: COMPLETED | OUTPATIENT
Start: 2019-06-05 | End: 2019-06-05

## 2019-06-05 RX ORDER — ONDANSETRON 8 MG/1
4 TABLET, FILM COATED ORAL ONCE
Refills: 0 | Status: COMPLETED | OUTPATIENT
Start: 2019-06-05 | End: 2019-06-05

## 2019-06-05 RX ORDER — SODIUM CHLORIDE 9 MG/ML
1000 INJECTION, SOLUTION INTRAVENOUS
Refills: 0 | Status: COMPLETED | OUTPATIENT
Start: 2019-06-05 | End: 2019-06-05

## 2019-06-05 RX ORDER — PANTOPRAZOLE SODIUM 20 MG/1
8 TABLET, DELAYED RELEASE ORAL
Qty: 80 | Refills: 0 | Status: DISCONTINUED | OUTPATIENT
Start: 2019-06-05 | End: 2019-06-07

## 2019-06-05 RX ORDER — MORPHINE SULFATE 50 MG/1
4 CAPSULE, EXTENDED RELEASE ORAL ONCE
Refills: 0 | Status: DISCONTINUED | OUTPATIENT
Start: 2019-06-05 | End: 2019-06-05

## 2019-06-05 RX ORDER — PANTOPRAZOLE SODIUM 20 MG/1
40 TABLET, DELAYED RELEASE ORAL ONCE
Refills: 0 | Status: COMPLETED | OUTPATIENT
Start: 2019-06-05 | End: 2019-06-05

## 2019-06-05 RX ORDER — SODIUM CHLORIDE 9 MG/ML
2000 INJECTION INTRAMUSCULAR; INTRAVENOUS; SUBCUTANEOUS ONCE
Refills: 0 | Status: COMPLETED | OUTPATIENT
Start: 2019-06-05 | End: 2019-06-05

## 2019-06-05 RX ADMIN — MORPHINE SULFATE 4 MILLIGRAM(S): 50 CAPSULE, EXTENDED RELEASE ORAL at 21:57

## 2019-06-05 RX ADMIN — ONDANSETRON 4 MILLIGRAM(S): 8 TABLET, FILM COATED ORAL at 20:18

## 2019-06-05 RX ADMIN — PANTOPRAZOLE SODIUM 40 MILLIGRAM(S): 20 TABLET, DELAYED RELEASE ORAL at 20:18

## 2019-06-05 RX ADMIN — SODIUM CHLORIDE 2000 MILLILITER(S): 9 INJECTION INTRAMUSCULAR; INTRAVENOUS; SUBCUTANEOUS at 20:06

## 2019-06-05 RX ADMIN — PANTOPRAZOLE SODIUM 10 MG/HR: 20 TABLET, DELAYED RELEASE ORAL at 21:58

## 2019-06-05 RX ADMIN — SODIUM CHLORIDE 2000 MILLILITER(S): 9 INJECTION INTRAMUSCULAR; INTRAVENOUS; SUBCUTANEOUS at 22:28

## 2019-06-05 RX ADMIN — SODIUM CHLORIDE 150 MILLILITER(S): 9 INJECTION, SOLUTION INTRAVENOUS at 22:21

## 2019-06-05 RX ADMIN — Medication 30 MILLILITER(S): at 21:57

## 2019-06-05 NOTE — ED ADULT NURSE REASSESSMENT NOTE - NS ED NURSE REASSESS COMMENT FT1
Patient will not stay still for EKG to be done. Patient , noted vomiting coffee grounds. Have patient connected to cardiac monitor and noted tachycardia.  Will continue to monitor.

## 2019-06-05 NOTE — ED PROVIDER NOTE - ATTENDING CONTRIBUTION TO CARE
Patient being seen with PAULA Killian.  Recent admission for etoh withdrawal, comes in now for severe epigastric pain and coffee ground emesis.  Denies shortness of breath, pain is the same pain he always has when drinking.      Plan: Labs, CT, protonix drip, admit

## 2019-06-05 NOTE — ED PROVIDER NOTE - CLINICAL SUMMARY MEDICAL DECISION MAKING FREE TEXT BOX
pt here with vomiting/epigastric pain, recently admitted for alcohol withdrawl, likely PUD vs gi bleed, will check labs, coags, type and screen, lactate, lipase, ekg,  ivf, antiemetics, protonix, CT abd, monitor

## 2019-06-05 NOTE — ED PROVIDER NOTE - OBJECTIVE STATEMENT
55 y/o male with PMH alcohol abuse, PUD here c/o vomiting, epigastric pain x 2 days. as per son, pt drinks 1 pint of vodka a day, last drink was 2 hours ago. as per son, pt was admitted here for same about 2 months ago. no fevers. +normal BM.     ROS: No fever/chills. No eye pain/changes in vision, No ear pain/sore throat/dysphagia, + chest pain no palpitations. No SOB/cough/. + abdominal pain, N/V no D, no black/bloody bm. No dysuria/frequency/discharge, No headache. No Dizziness.    No rashes or breaks in skin. No numbness/tingling/weakness.

## 2019-06-05 NOTE — ED ADULT NURSE REASSESSMENT NOTE - NS ED NURSE REASSESS COMMENT FT1
Patient had desat to 89, applied oxygen via n/c at 2 liters. Noted patient increase to 97%. Will continue to monitor.

## 2019-06-05 NOTE — ED ADULT TRIAGE NOTE - CHIEF COMPLAINT QUOTE
Reports mid ABD pain , and burning to mid chest with N/V since yesterday. emesis coffee ground. Reports generalized ABD pain , and burning to mid chest with N/V since yesterday. emesis coffee ground.

## 2019-06-05 NOTE — ED PROVIDER NOTE - PHYSICAL EXAMINATION
Gen: Alert, mild distress, +vomiting coffee ground  Head: NC, AT, PERRL, EOMI, normal lids/conjunctiva  ENT: B TM WNL, normal hearing, patent oropharynx without erythema/exudate, uvula midline  Neck: +supple, no tenderness/meningismus/JVD, +Trachea midline  Pulm: Bilateral BS, normal resp effort, no wheeze/stridor/retractions  CV: RRR, no M/R/G, +dist pulses  Abd: soft, +generalized tenderness, ND, +BS, no hepatosplenomegaly  Mskel: no edema/erythema/cyanosis  Skin: no rash  Neuro: AAOx3, no sensory/motor deficits, CN 2-12 intact

## 2019-06-06 DIAGNOSIS — F10.230 ALCOHOL DEPENDENCE WITH WITHDRAWAL, UNCOMPLICATED: ICD-10-CM

## 2019-06-06 DIAGNOSIS — K92.2 GASTROINTESTINAL HEMORRHAGE, UNSPECIFIED: ICD-10-CM

## 2019-06-06 DIAGNOSIS — K22.8 OTHER SPECIFIED DISEASES OF ESOPHAGUS: ICD-10-CM

## 2019-06-06 LAB
ALBUMIN SERPL ELPH-MCNC: 3.4 G/DL — SIGNIFICANT CHANGE UP (ref 3.3–5)
ALP SERPL-CCNC: 47 U/L — SIGNIFICANT CHANGE UP (ref 40–120)
ALT FLD-CCNC: 32 U/L — SIGNIFICANT CHANGE UP (ref 12–78)
ANION GAP SERPL CALC-SCNC: 10 MMOL/L — SIGNIFICANT CHANGE UP (ref 5–17)
AST SERPL-CCNC: 26 U/L — SIGNIFICANT CHANGE UP (ref 15–37)
BILIRUB DIRECT SERPL-MCNC: 0.27 MG/DL — HIGH (ref 0.05–0.2)
BILIRUB INDIRECT FLD-MCNC: 0.9 MG/DL — SIGNIFICANT CHANGE UP (ref 0.2–1)
BILIRUB SERPL-MCNC: 1.2 MG/DL — SIGNIFICANT CHANGE UP (ref 0.2–1.2)
BUN SERPL-MCNC: 11 MG/DL — SIGNIFICANT CHANGE UP (ref 7–23)
CALCIUM SERPL-MCNC: 7.7 MG/DL — LOW (ref 8.5–10.1)
CHLORIDE SERPL-SCNC: 107 MMOL/L — SIGNIFICANT CHANGE UP (ref 96–108)
CO2 SERPL-SCNC: 28 MMOL/L — SIGNIFICANT CHANGE UP (ref 22–31)
CREAT SERPL-MCNC: 0.84 MG/DL — SIGNIFICANT CHANGE UP (ref 0.5–1.3)
GLUCOSE SERPL-MCNC: 90 MG/DL — SIGNIFICANT CHANGE UP (ref 70–99)
LACTATE SERPL-SCNC: 2.8 MMOL/L — HIGH (ref 0.7–2)
MAGNESIUM SERPL-MCNC: 1.9 MG/DL — SIGNIFICANT CHANGE UP (ref 1.6–2.6)
PHOSPHATE SERPL-MCNC: 2.4 MG/DL — LOW (ref 2.5–4.5)
POTASSIUM SERPL-MCNC: 3.6 MMOL/L — SIGNIFICANT CHANGE UP (ref 3.5–5.3)
POTASSIUM SERPL-SCNC: 3.6 MMOL/L — SIGNIFICANT CHANGE UP (ref 3.5–5.3)
PROT SERPL-MCNC: 7.1 GM/DL — SIGNIFICANT CHANGE UP (ref 6–8.3)
SODIUM SERPL-SCNC: 145 MMOL/L — SIGNIFICANT CHANGE UP (ref 135–145)

## 2019-06-06 PROCEDURE — 12345: CPT | Mod: NC

## 2019-06-06 PROCEDURE — 93010 ELECTROCARDIOGRAM REPORT: CPT

## 2019-06-06 PROCEDURE — 99223 1ST HOSP IP/OBS HIGH 75: CPT

## 2019-06-06 RX ORDER — ACETAMINOPHEN 500 MG
1000 TABLET ORAL ONCE
Refills: 0 | Status: COMPLETED | OUTPATIENT
Start: 2019-06-06 | End: 2019-06-07

## 2019-06-06 RX ORDER — FOLIC ACID 0.8 MG
1 TABLET ORAL DAILY
Refills: 0 | Status: DISCONTINUED | OUTPATIENT
Start: 2019-06-06 | End: 2019-06-07

## 2019-06-06 RX ORDER — ONDANSETRON 8 MG/1
4 TABLET, FILM COATED ORAL EVERY 6 HOURS
Refills: 0 | Status: DISCONTINUED | OUTPATIENT
Start: 2019-06-06 | End: 2019-06-07

## 2019-06-06 RX ORDER — SODIUM CHLORIDE 9 MG/ML
1000 INJECTION INTRAMUSCULAR; INTRAVENOUS; SUBCUTANEOUS
Refills: 0 | Status: DISCONTINUED | OUTPATIENT
Start: 2019-06-06 | End: 2019-06-07

## 2019-06-06 RX ORDER — THIAMINE MONONITRATE (VIT B1) 100 MG
100 TABLET ORAL DAILY
Refills: 0 | Status: DISCONTINUED | OUTPATIENT
Start: 2019-06-06 | End: 2019-06-07

## 2019-06-06 RX ORDER — MORPHINE SULFATE 50 MG/1
2 CAPSULE, EXTENDED RELEASE ORAL EVERY 4 HOURS
Refills: 0 | Status: DISCONTINUED | OUTPATIENT
Start: 2019-06-06 | End: 2019-06-07

## 2019-06-06 RX ADMIN — Medication 1.5 MILLIGRAM(S): at 21:17

## 2019-06-06 RX ADMIN — MORPHINE SULFATE 2 MILLIGRAM(S): 50 CAPSULE, EXTENDED RELEASE ORAL at 18:41

## 2019-06-06 RX ADMIN — MORPHINE SULFATE 2 MILLIGRAM(S): 50 CAPSULE, EXTENDED RELEASE ORAL at 19:40

## 2019-06-06 RX ADMIN — SODIUM CHLORIDE 100 MILLILITER(S): 9 INJECTION INTRAMUSCULAR; INTRAVENOUS; SUBCUTANEOUS at 21:20

## 2019-06-06 RX ADMIN — PANTOPRAZOLE SODIUM 10 MG/HR: 20 TABLET, DELAYED RELEASE ORAL at 21:20

## 2019-06-06 RX ADMIN — PANTOPRAZOLE SODIUM 10 MG/HR: 20 TABLET, DELAYED RELEASE ORAL at 06:01

## 2019-06-06 RX ADMIN — Medication 100 MILLIGRAM(S): at 12:06

## 2019-06-06 RX ADMIN — Medication 2 MILLIGRAM(S): at 17:06

## 2019-06-06 RX ADMIN — Medication 2 MILLIGRAM(S): at 05:25

## 2019-06-06 RX ADMIN — Medication 2 MILLIGRAM(S): at 12:17

## 2019-06-06 RX ADMIN — Medication 2 MILLIGRAM(S): at 02:25

## 2019-06-06 RX ADMIN — ONDANSETRON 4 MILLIGRAM(S): 8 TABLET, FILM COATED ORAL at 12:06

## 2019-06-06 NOTE — H&P ADULT - NSICDXPASTMEDICALHX_GEN_ALL_CORE_FT
Attending Attestation (For Attendings USE Only)... PAST MEDICAL HISTORY:  EtOH dependence     Mixed hyperlipidemia     PUD (peptic ulcer disease)

## 2019-06-06 NOTE — H&P ADULT - NSHPPHYSICALEXAM_GEN_ALL_CORE
Patient is a 54y old  Male who presents with a chief complaint of     INTERVAL HPI/OVERNIGHT EVENTS:    Vital Signs Last 24 Hrs  T(C): 37.1 (06 Jun 2019 00:53), Max: 37.1 (06 Jun 2019 00:53)  T(F): 98.8 (06 Jun 2019 00:53), Max: 98.8 (06 Jun 2019 00:53)  HR: 104 (06 Jun 2019 00:53) (100 - 127)  BP: 140/82 (06 Jun 2019 00:53) (126/75 - 145/88)  BP(mean): --  RR: 22 (06 Jun 2019 00:53) (20 - 29)  SpO2: 100% (06 Jun 2019 00:53) (96% - 100%)    PHYSICAL EXAM:  GENERAL: NAD, well-groomed, well-developed  HEAD:  Atraumatic, Normocephalic  EYES: EOMI, PERRLA, conjunctiva and sclera clear  ENMT: No tonsillar erythema, exudates, or enlargement; dry mucous membranes,  No lesions  NECK: Supple, No JVD, Normal thyroid  NERVOUS SYSTEM:  Alert & Oriented X3, CN 2-12 intact, no focal deficits  CHEST/LUNG: Clear to percussion bilaterally; No rales, rhonchi, wheezing, or rubs  HEART: Regular rate and rhythm; No murmurs, rubs, or gallops  ABDOMEN: Soft, epigastric tenderness, Nondistended; Bowel sounds present  EXTREMITIES:  2+ Peripheral Pulses, No clubbing, cyanosis, or edema  LYMPH: No lymphadenopathy noted  SKIN: No rashes or lesions

## 2019-06-06 NOTE — H&P ADULT - PROBLEM SELECTOR PLAN 3
monitor telemetry, benzodiazepines, IV fluids, CIWA protocol, monitor and replace vitamins and electrolytes

## 2019-06-06 NOTE — H&P ADULT - HISTORY OF PRESENT ILLNESS
55 y/o male with PMH alcohol abuse, PUD here c/o vomiting of coffee grounds, epigastric pain x 2 days. Pt with multiple admissions for alcohol related problems, as per son, pt drinks 1 pint of vodka a day, last drink was 2 hours ago.  According to son, pt was admitted here for same about 2 months ago. He has had no fevers, cough or SOB/CP. He has had normal BM, denies rectal bleeding, melena. Denies NSAID consumption. On PPI, unsure of compliance.

## 2019-06-06 NOTE — H&P ADULT - ASSESSMENT
55 y/o male with PMH alcohol abuse, PUD here c/o vomiting of coffee grounds, epigastric pain x 2 days. Pt with multiple admissions for alcohol related problems, as per son, pt drinks 1 pint of vodka a day, last drink was 2 hours ago.  According to son, pt was admitted here for same about 2 months ago. He has had no fevers, cough or SOB/CP. He has had normal BM, denies rectal bleeding, melena. Denies NSAID consumption. On PPI, unsure of compliance. Pt started on PPI drip in ED. Will treat for UGI bleed, alcohol withdrawal.  IMPROVE VTE Individual Risk Assessment          RISK                                                          Points    [  ] Previous VTE                                                3    [  ] Thrombophilia                                             2    [  ] Lower limb paralysis                                    2        (unable to hold up >15 seconds)      [  ] Current Cancer                                             2         (within 6 months)    [  ] Immobilization > 24 hrs                              1    [  ] ICU/CCU stay > 24 hours                            1    [  ] Age > 60                                                    1    IMPROVE VTE Score _0________

## 2019-06-06 NOTE — H&P ADULT - NSHPREVIEWOFSYSTEMS_GEN_ALL_CORE
ROS: No fever/chills. No eye pain/changes in vision, No ear pain/sore throat/dysphagia, no chest pain no palpitations. No SOB/cough/. + abdominal pain, N/V no D, no black/bloody bm. No dysuria/frequency/discharge, No headache. No Dizziness.    No rashes or breaks in skin. No numbness/tingling/weakness.

## 2019-06-06 NOTE — CONSULT NOTE ADULT - ASSESSMENT
HPI:  55 y/o male with PMH alcohol abuse, PUD here c/o vomiting of coffee grounds, epigastric pain x 2 days. Pt with multiple admissions for alcohol related problems, as per son, pt drinks 1 pint of vodka a day, last drink was 2 hours ago.  According to son, pt was admitted here for same about 2 months ago. He has had no fevers, cough or SOB/CP. He has had normal BM, denies rectal bleeding, melena. Denies NSAID consumption. On PPI, unsure of compliance. (06 Jun 2019 02:09)  ------------------ As Above -------------------------------------------------------------  The patient is a poor historian.  The patient presented with not feeling well, epigastric pain and coffee ground emesis. Started two days prior to admission. States that he drank for the four days prior to having symptoms. Denies being a big drinker. Has had multiple admissions for same problem. Had an EGD a few months at another hospital (?). The patient denies melena, hematochezia, constipation, diarrhea, or change in bowel movements     N/V, abdominal pain, hematemesis - 1) EGD in AM 2) IV PPI continuous 3) NPO and advance slowly as tolerated 4) social work consult

## 2019-06-06 NOTE — CONSULT NOTE ADULT - SUBJECTIVE AND OBJECTIVE BOX
HPI:  53 y/o male with PMH alcohol abuse, PUD here c/o vomiting of coffee grounds, epigastric pain x 2 days. Pt with multiple admissions for alcohol related problems, as per son, pt drinks 1 pint of vodka a day, last drink was 2 hours ago.  According to son, pt was admitted here for same about 2 months ago. He has had no fevers, cough or SOB/CP. He has had normal BM, denies rectal bleeding, melena. Denies NSAID consumption. On PPI, unsure of compliance. (06 Jun 2019 02:09)  ------------------ As Above -------------------------------------------------------------  The patient is a poor historian.  The patient presented with not feeling well, epigastric pain and coffee ground emesis. Started two days prior to admission. States that he drank for the four days prior to having symptoms. Denies being a big drinker. Has had multiple admissions for same problem. Had an EGD a few months at another hospital (?). The patient denies melena, hematochezia, constipation, diarrhea, or change in bowel movements       PAST MEDICAL & SURGICAL HISTORY:  EtOH dependence  Mixed hyperlipidemia  PUD (peptic ulcer disease)  No significant past surgical history      MEDICATIONS  (STANDING):  acetaminophen  IVPB .. 1000 milliGRAM(s) IV Intermittent once  folic acid 1 milliGRAM(s) Oral daily  LORazepam   Injectable   IV Push   LORazepam   Injectable 2 milliGRAM(s) IV Push every 6 hours  LORazepam   Injectable 1.5 milliGRAM(s) IV Push every 4 hours  multivitamin 1 Tablet(s) Oral daily  pantoprazole Infusion 8 mG/Hr (10 mL/Hr) IV Continuous <Continuous>  sodium chloride 0.9%. 1000 milliLiter(s) (100 mL/Hr) IV Continuous <Continuous>  thiamine 100 milliGRAM(s) Oral daily    MEDICATIONS  (PRN):  morphine  - Injectable 2 milliGRAM(s) IV Push every 4 hours PRN Severe Pain (7 - 10)  ondansetron Injectable 4 milliGRAM(s) IV Push every 6 hours PRN Nausea and/or Vomiting      Allergies    No Known Allergies    Intolerances        FAMILY HISTORY:  No pertinent family history in first degree relatives (Father, Mother)      REVIEW OF SYSTEMS:    CONSTITUTIONAL: No fever, weight loss, or fatigue  EYES: No eye pain, visual disturbances, or discharge  ENMT:  No difficulty hearing, tinnitus, vertigo; No sinus or throat pain  NECK: No pain or stiffness  BREASTS: No pain, masses, or nipple discharge  RESPIRATORY: No cough, wheezing, chills or hemoptysis; No shortness of breath  CARDIOVASCULAR: No chest pain, palpitations, dizziness, or leg swelling  GASTROINTESTINAL: See above  GENITOURINARY: No dysuria, frequency, hematuria, or incontinence  NEUROLOGICAL: No headaches, memory loss, loss of strength, numbness, or tremors  SKIN: No itching, burning, rashes, or lesions   LYMPH NODES: No enlarged glands  ENDOCRINE: No heat or cold intolerance; No hair loss  MUSCULOSKELETAL: No joint pain or swelling; No muscle, back, or extremity pain  PSYCHIATRIC: No depression, anxiety, mood swings, or difficulty sleeping  HEME/LYMPH: No easy bruising, or bleeding gums  ALLERGY AND IMMUNOLOGIC: No hives or eczema          SOCIAL HISTORY:    FAMILY HISTORY:  No pertinent family history in first degree relatives (Father, Mother)      Vital Signs Last 24 Hrs  T(C): 37.2 (06 Jun 2019 11:22), Max: 37.2 (06 Jun 2019 11:22)  T(F): 98.9 (06 Jun 2019 11:22), Max: 98.9 (06 Jun 2019 11:22)  HR: 100 (06 Jun 2019 11:22) (95 - 127)  BP: 118/80 (06 Jun 2019 11:22) (109/73 - 145/88)  BP(mean): --  RR: 17 (06 Jun 2019 11:22) (17 - 29)  SpO2: 98% (06 Jun 2019 11:22) (95% - 100%)    PHYSICAL EXAM:    GENERAL: NAD, well-groomed, well-developed  HEAD:  Atraumatic, Normocephalic  EYES: EOMI, PERRLA, conjunctiva and sclera clear  NECK: Supple, No JVD, Normal thyroid  NERVOUS SYSTEM:  Alert & Oriented X3, Good concentration; Motor Strength 5/5 B/L upper and lower extremities;   CHEST/LUNG: Clear to percussion bilaterally; No rales, rhonchi, wheezing, or rubs  HEART: Regular rate and rhythm; No murmurs, rubs, or gallops  ABDOMEN: Soft, Mild mid epigastric tenderness, Nondistended; Bowel sounds present  EXTREMITIES:  2+ Peripheral Pulses, No clubbing, cyanosis, or edema  LYMPH: No lymphadenopathy noted   RECTAL: refused   SKIN: No rashes or lesions    LABS:                        16.1   7.67  )-----------( 267      ( 05 Jun 2019 21:43 )             48.9       CBC:  06-05 @ 21:43  WBC  7.67  HGB 16.1  HCT 48.9 Plate 267  MCV 82.9           06 Jun 2019 07:56    145    |  107    |  11     ----------------------------<  90     3.6     |  28     |  0.84   05 Jun 2019 20:38    139    |  95     |  11     ----------------------------<  114    3.3     |  25     |  1.01     Ca    7.7        06 Jun 2019 07:56  Ca    9.2        05 Jun 2019 20:38  Phos  2.4       06 Jun 2019 07:56  Mg     1.9       06 Jun 2019 07:56  Mg     2.0       05 Jun 2019 20:38    TPro  7.1    /  Alb  3.4    /  TBili  1.2    /  DBili  .27    /  AST  26     /  ALT  32     /  AlkPhos  47     06 Jun 2019 07:56  TPro  9.4    /  Alb  4.4    /  TBili  0.6    /  DBili  x      /  AST  38     /  ALT  42     /  AlkPhos  61     05 Jun 2019 20:38    PT/INR - ( 05 Jun 2019 21:43 )   PT: 10.6 sec;   INR: 0.95 ratio         PTT - ( 05 Jun 2019 21:43 )  PTT:28.0 sec        RADIOLOGY & ADDITIONAL STUDIES:

## 2019-06-06 NOTE — H&P ADULT - NSHPLABSRESULTS_GEN_ALL_CORE
LABS:                        16.1   7.67  )-----------( 267      ( 05 Jun 2019 21:43 )             48.9     06-05    139  |  95<L>  |  11  ----------------------------<  114<H>  3.3<L>   |  25  |  1.01    Ca    9.2      05 Jun 2019 20:38  Mg     2.0     06-05    TPro  9.4<H>  /  Alb  4.4  /  TBili  0.6  /  DBili  x   /  AST  38<H>  /  ALT  42  /  AlkPhos  61  06-05    PT/INR - ( 05 Jun 2019 21:43 )   PT: 10.6 sec;   INR: 0.95 ratio         PTT - ( 05 Jun 2019 21:43 )  PTT:28.0 sec    CAPILLARY BLOOD GLUCOSE  Lactate, Blood: 6.9:  mmol/L (06.05.19 @ 21:43)  Alcohol, Blood: 316: mg/dL (06.05.19 @ 20:38)                RADIOLOGY & ADDITIONAL TESTS:  CXR: no infiltrate  < from: CT Abdomen and Pelvis w/ IV Cont (06.05.19 @ 23:31) >    LUNG BASES:  There are no pleural effusions.  PERITONEUM:  There is no free air or focal collection.  No free fluid.  LIVER: Fatty and enlarged.  SPLEEN: Normal.  GALLBLADDER: Unremarkable.  BILIARY TREE: Unremarkable  PANCREAS: Normal.  ADRENAL GLANDS: Small myelolipoma in the right adrenal gland.  KIDNEYS: Subcentimeter left kidney.  BOWEL: Moderate thickened distal esophagus suggesting esophagitis. The   stomach is incompletely distended.  There is no small bowel obstruction,   focal bowel wall thickening or diverticulitis. The appendix is   unremarkable. Scattered colonic diverticulosis of the cecum.    < end of copied text >      Imaging Personally Reviewed:  [x ] YES  [ ] NO  EKG: pending

## 2019-06-06 NOTE — CHART NOTE - NSCHARTNOTEFT_GEN_A_CORE
53 y/o male with PMH alcohol abuse, PUD here c/o vomiting of coffee grounds, epigastric pain x 2 days. Pt with multiple admissions for alcohol related problems, as per son, pt drinks 1 pint of vodka a day, last drink was 2 hours ago. Please see H&P for more details. GI consult appreciated. Pt was seen and examined at the bedside. Continue with IV protonix, EGD in am.

## 2019-06-07 ENCOUNTER — TRANSCRIPTION ENCOUNTER (OUTPATIENT)
Age: 54
End: 2019-06-07

## 2019-06-07 VITALS
RESPIRATION RATE: 18 BRPM | DIASTOLIC BLOOD PRESSURE: 77 MMHG | OXYGEN SATURATION: 97 % | TEMPERATURE: 98 F | HEART RATE: 68 BPM | SYSTOLIC BLOOD PRESSURE: 129 MMHG

## 2019-06-07 LAB
ANION GAP SERPL CALC-SCNC: 9 MMOL/L — SIGNIFICANT CHANGE UP (ref 5–17)
BUN SERPL-MCNC: 10 MG/DL — SIGNIFICANT CHANGE UP (ref 7–23)
CALCIUM SERPL-MCNC: 8.6 MG/DL — SIGNIFICANT CHANGE UP (ref 8.5–10.1)
CHLORIDE SERPL-SCNC: 106 MMOL/L — SIGNIFICANT CHANGE UP (ref 96–108)
CO2 SERPL-SCNC: 23 MMOL/L — SIGNIFICANT CHANGE UP (ref 22–31)
CREAT SERPL-MCNC: 0.69 MG/DL — SIGNIFICANT CHANGE UP (ref 0.5–1.3)
GLUCOSE SERPL-MCNC: 92 MG/DL — SIGNIFICANT CHANGE UP (ref 70–99)
MAGNESIUM SERPL-MCNC: 2.4 MG/DL — SIGNIFICANT CHANGE UP (ref 1.6–2.6)
PHOSPHATE SERPL-MCNC: 2 MG/DL — LOW (ref 2.5–4.5)
POTASSIUM SERPL-MCNC: 3.6 MMOL/L — SIGNIFICANT CHANGE UP (ref 3.5–5.3)
POTASSIUM SERPL-SCNC: 3.6 MMOL/L — SIGNIFICANT CHANGE UP (ref 3.5–5.3)
SODIUM SERPL-SCNC: 138 MMOL/L — SIGNIFICANT CHANGE UP (ref 135–145)

## 2019-06-07 PROCEDURE — 88305 TISSUE EXAM BY PATHOLOGIST: CPT | Mod: 26

## 2019-06-07 PROCEDURE — 88312 SPECIAL STAINS GROUP 1: CPT | Mod: 26

## 2019-06-07 PROCEDURE — 99239 HOSP IP/OBS DSCHRG MGMT >30: CPT

## 2019-06-07 RX ORDER — PANTOPRAZOLE SODIUM 20 MG/1
40 TABLET, DELAYED RELEASE ORAL
Refills: 0 | Status: DISCONTINUED | OUTPATIENT
Start: 2019-06-07 | End: 2019-06-07

## 2019-06-07 RX ORDER — POTASSIUM PHOSPHATE, MONOBASIC POTASSIUM PHOSPHATE, DIBASIC 236; 224 MG/ML; MG/ML
15 INJECTION, SOLUTION INTRAVENOUS ONCE
Refills: 0 | Status: DISCONTINUED | OUTPATIENT
Start: 2019-06-07 | End: 2019-06-07

## 2019-06-07 RX ADMIN — Medication 1.5 MILLIGRAM(S): at 05:27

## 2019-06-07 RX ADMIN — Medication 400 MILLIGRAM(S): at 08:53

## 2019-06-07 RX ADMIN — Medication 1.5 MILLIGRAM(S): at 01:29

## 2019-06-07 RX ADMIN — Medication 1.5 MILLIGRAM(S): at 09:59

## 2019-06-07 NOTE — DISCHARGE NOTE PROVIDER - NSDCCPCAREPLAN_GEN_ALL_CORE_FT
PRINCIPAL DISCHARGE DIAGNOSIS  Diagnosis: Alcohol withdrawal syndrome without complication  Assessment and Plan of Treatment: CIWA protocol not completed.   Pt was counseled about health risk of leaving AMA and alcohol abuse.   Pt is recommended to avoid all alcohol products.      SECONDARY DISCHARGE DIAGNOSES  Diagnosis: Gastritis  Assessment and Plan of Treatment: mild antral gastritis s/p biopsy  continue with Protonix and Sucralafate   Follow up with GI for biopsy results.    Diagnosis: UGI bleed  Assessment and Plan of Treatment: Resolved.  mild antral gastritis s/p biopsy

## 2019-06-07 NOTE — BRIEF OPERATIVE NOTE - NSICDXBRIEFPOSTOP_GEN_ALL_CORE_FT
POST-OP DIAGNOSIS:  Gastritis 07-Jun-2019 08:29:44  Azeem Finn  Esophageal ulcer 07-Jun-2019 08:29:33  Azeem Finn  Esophagitis 07-Jun-2019 08:29:04  Azeem Finn

## 2019-06-07 NOTE — DISCHARGE NOTE PROVIDER - HOSPITAL COURSE
55 y/o male with PMH alcohol abuse, PUD here c/o vomiting of coffee grounds, epigastric pain x 2 days. Pt with multiple admissions for alcohol related problems, as per son, pt drinks 1 pint of vodka a day, last drink was 2 hours ago.  According to son, pt was admitted here for same about 2 months ago. He has had no fevers, cough or SOB/CP. He has had normal BM, denies rectal bleeding, melena. Denies NSAID consumption. On PPI, unsure of compliance.        The patient was admitted to Telemetry bed under [facility] protocol. Gastroenterology was consulted and followed the patient. The patient was keep NPO and started on Protonix drip. the patient underwent EGD today 6/7. The patient was found to have mild antral gastritis, biopsy was done. The patient is recommended to continue with Protonix and Sucralfate. Today the patient states the he wants to leave Against medical advice. The  patient is oriented X4 and was explained the risk of leaving AMA including death. He has still chosen to leave AMA.

## 2019-06-07 NOTE — PRE-OP CHECKLIST - PATIENT'S PERSONAL PROPERTY REMOVED
RIGHt hand bracelet unable to be removed. Patient states it has been on for many years and does not come off./[employer]

## 2019-06-07 NOTE — PROGRESS NOTE ADULT - SUBJECTIVE AND OBJECTIVE BOX
Procedure:           Upper GI endoscopy 06-07-19 @ 08:25    Indications:                     Monitored Anesthesia Care Provided by :     ____________________________________________________________________________________________________  Procedure:           Pre-Anesthesia Assessment:                       - Prior to the procedure, a History and Physical was performed, and patient                        medications and allergies were reviewed. The patient is competent. The risks                        and benefits of the procedure and the sedation options and risks were                        discussed with the patient. All questions were answered and informed consent                        was obtained. Patient identification and proposed procedure were verified by                        the physician, the nurse and the anesthesiologist in the procedure room.                        Mental Status Examination:     alert and oriented. Airway Examination: normal                        oropharyngeal airway and neck mobility. Respiratory Examination: clear to                        auscultation. CV Examination: normal. Prophylactic Antibiotics: The patient                        does not require prophylactic antibiotics.                        Grade Assessment: -. After                        reviewing the risks and benefits, the patient was deemed in satisfactory                        condition to undergo the procedure. The anesthesia plan was to use monitored                        anesthesia care (MAC). Immediately prior to administration of medications,                        the patient was re-assessed for adequacy to receive sedatives. The heart        		     rate, respiratory rate, oxygen saturations, blood pressure, adequacy of                        pulmonary ventilation, and response to care were monitored throughout the                        procedure. The physical status of the patient was re-assessed after the                        procedure.                       After obtaining informed consent, the endoscope was passed under direct                        vision. Throughout the procedure, the patient's blood pressure, pulse, and                        oxygen saturations were monitored continuously. The Endoscope was introduced                        through the mouth, and advanced to the second part of duodenum. The upper GI                        endoscopy was accomplished with ease. The patient tolerated the procedure                        well.    ESOPHAGUS:  Mild narrowing in UES ( minimal tension necessary ), Superficial ulceration / esophagitis lower 1/2 of esophagus s/p biopsy    STOMACH: mild antral gastritis s/p biopsy    DUODENUM: WNL      Assessment : As above    PLAN :  PPI BID, Carafate, advance diet

## 2019-06-10 LAB — SURGICAL PATHOLOGY STUDY: SIGNIFICANT CHANGE UP

## 2019-06-10 NOTE — PROGRESS NOTE ADULT - PROBLEM SELECTOR PLAN 2
Continue CIWA protocol with ATIVAN
Continue Protonix 40  BID
DT protocol.
DT protocol.
GI note appreciated  -continue protonix 40mg BID and carafate 1g q6
as above   case d/w MIRIAN Frias for referral to substance abuse program..
continue MAGIC MOUTHWASH(LIDO, BENADRYL, MAALOX).   Outpatient EGD as discussed with patient
continue MAGIC MOUTHWASH(LIDO, BENADRYL, MAALOX).   Outpatient EGD.
e MAGIC MOUTHWASH(LIDO, BENADRYL, MAALOX).   Outpatient EGD
Principal Discharge DX:	Gout

## 2019-06-19 DIAGNOSIS — F10.239 ALCOHOL DEPENDENCE WITH WITHDRAWAL, UNSPECIFIED: ICD-10-CM

## 2019-06-19 DIAGNOSIS — K92.2 GASTROINTESTINAL HEMORRHAGE, UNSPECIFIED: ICD-10-CM
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
## 2019-06-19 DIAGNOSIS — K22.2 ESOPHAGEAL OBSTRUCTION: ICD-10-CM

## 2019-06-19 DIAGNOSIS — K29.71 GASTRITIS, UNSPECIFIED, WITH BLEEDING: ICD-10-CM

## 2019-06-19 DIAGNOSIS — K22.8 OTHER SPECIFIED DISEASES OF ESOPHAGUS: ICD-10-CM

## 2019-06-19 DIAGNOSIS — K22.11 ULCER OF ESOPHAGUS WITH BLEEDING: ICD-10-CM

## 2019-06-20 ENCOUNTER — INPATIENT (INPATIENT)
Facility: HOSPITAL | Age: 54
LOS: 17 days | Discharge: HOME | End: 2019-07-08
Attending: INTERNAL MEDICINE | Admitting: INTERNAL MEDICINE
Payer: MEDICAID

## 2019-06-20 VITALS
SYSTOLIC BLOOD PRESSURE: 139 MMHG | WEIGHT: 158.73 LBS | HEART RATE: 136 BPM | OXYGEN SATURATION: 99 % | TEMPERATURE: 98 F | RESPIRATION RATE: 20 BRPM | HEIGHT: 67 IN | DIASTOLIC BLOOD PRESSURE: 95 MMHG

## 2019-06-20 DIAGNOSIS — F10.230 ALCOHOL DEPENDENCE WITH WITHDRAWAL, UNCOMPLICATED: ICD-10-CM

## 2019-06-20 DIAGNOSIS — K27.9 PEPTIC ULCER, SITE UNSPECIFIED, UNSPECIFIED AS ACUTE OR CHRONIC, WITHOUT HEMORRHAGE OR PERFORATION: ICD-10-CM

## 2019-06-20 DIAGNOSIS — F10.20 ALCOHOL DEPENDENCE, UNCOMPLICATED: ICD-10-CM

## 2019-06-20 DIAGNOSIS — E78.2 MIXED HYPERLIPIDEMIA: ICD-10-CM

## 2019-06-20 LAB
ALBUMIN SERPL ELPH-MCNC: 4.6 G/DL — SIGNIFICANT CHANGE UP (ref 3.3–5)
ALP SERPL-CCNC: 55 U/L — SIGNIFICANT CHANGE UP (ref 40–120)
ALT FLD-CCNC: 49 U/L — SIGNIFICANT CHANGE UP (ref 12–78)
ANION GAP SERPL CALC-SCNC: 17 MMOL/L — SIGNIFICANT CHANGE UP (ref 5–17)
APPEARANCE UR: CLEAR — SIGNIFICANT CHANGE UP
APTT BLD: 26.8 SEC — LOW (ref 28.5–37)
AST SERPL-CCNC: 46 U/L — HIGH (ref 15–37)
BASOPHILS # BLD AUTO: 0.05 K/UL — SIGNIFICANT CHANGE UP (ref 0–0.2)
BASOPHILS NFR BLD AUTO: 0.5 % — SIGNIFICANT CHANGE UP (ref 0–2)
BILIRUB SERPL-MCNC: 1.1 MG/DL — SIGNIFICANT CHANGE UP (ref 0.2–1.2)
BILIRUB UR-MCNC: NEGATIVE — SIGNIFICANT CHANGE UP
BLD GP AB SCN SERPL QL: SIGNIFICANT CHANGE UP
BUN SERPL-MCNC: 11 MG/DL — SIGNIFICANT CHANGE UP (ref 7–23)
CALCIUM SERPL-MCNC: 10 MG/DL — SIGNIFICANT CHANGE UP (ref 8.5–10.1)
CHLORIDE SERPL-SCNC: 94 MMOL/L — LOW (ref 96–108)
CO2 SERPL-SCNC: 29 MMOL/L — SIGNIFICANT CHANGE UP (ref 22–31)
COLOR SPEC: YELLOW — SIGNIFICANT CHANGE UP
CREAT SERPL-MCNC: 1.19 MG/DL — SIGNIFICANT CHANGE UP (ref 0.5–1.3)
DIFF PNL FLD: NEGATIVE — SIGNIFICANT CHANGE UP
EOSINOPHIL # BLD AUTO: 0 K/UL — SIGNIFICANT CHANGE UP (ref 0–0.5)
EOSINOPHIL NFR BLD AUTO: 0 % — SIGNIFICANT CHANGE UP (ref 0–6)
EPI CELLS # UR: SIGNIFICANT CHANGE UP
GLUCOSE SERPL-MCNC: 202 MG/DL — HIGH (ref 70–99)
GLUCOSE UR QL: NEGATIVE MG/DL — SIGNIFICANT CHANGE UP
HCT VFR BLD CALC: 46.8 % — SIGNIFICANT CHANGE UP (ref 39–50)
HGB BLD-MCNC: 15.4 G/DL — SIGNIFICANT CHANGE UP (ref 13–17)
IMM GRANULOCYTES NFR BLD AUTO: 0.4 % — SIGNIFICANT CHANGE UP (ref 0–1.5)
INR BLD: 1.11 RATIO — SIGNIFICANT CHANGE UP (ref 0.88–1.16)
KETONES UR-MCNC: ABNORMAL
LACTATE SERPL-SCNC: 1.3 MMOL/L — SIGNIFICANT CHANGE UP (ref 0.7–2)
LACTATE SERPL-SCNC: 7 MMOL/L — CRITICAL HIGH (ref 0.7–2)
LEUKOCYTE ESTERASE UR-ACNC: NEGATIVE — SIGNIFICANT CHANGE UP
LIDOCAIN IGE QN: 157 U/L — SIGNIFICANT CHANGE UP (ref 73–393)
LYMPHOCYTES # BLD AUTO: 0.55 K/UL — LOW (ref 1–3.3)
LYMPHOCYTES # BLD AUTO: 5.6 % — LOW (ref 13–44)
MAGNESIUM SERPL-MCNC: 1.8 MG/DL — SIGNIFICANT CHANGE UP (ref 1.6–2.6)
MCHC RBC-ENTMCNC: 27.5 PG — SIGNIFICANT CHANGE UP (ref 27–34)
MCHC RBC-ENTMCNC: 32.9 GM/DL — SIGNIFICANT CHANGE UP (ref 32–36)
MCV RBC AUTO: 83.7 FL — SIGNIFICANT CHANGE UP (ref 80–100)
MONOCYTES # BLD AUTO: 0.43 K/UL — SIGNIFICANT CHANGE UP (ref 0–0.9)
MONOCYTES NFR BLD AUTO: 4.4 % — SIGNIFICANT CHANGE UP (ref 2–14)
NEUTROPHILS # BLD AUTO: 8.73 K/UL — HIGH (ref 1.8–7.4)
NEUTROPHILS NFR BLD AUTO: 89.1 % — HIGH (ref 43–77)
NITRITE UR-MCNC: NEGATIVE — SIGNIFICANT CHANGE UP
NRBC # BLD: 0 /100 WBCS — SIGNIFICANT CHANGE UP (ref 0–0)
OB PNL STL: NEGATIVE — SIGNIFICANT CHANGE UP
PH UR: 9 — HIGH (ref 5–8)
PLATELET # BLD AUTO: 270 K/UL — SIGNIFICANT CHANGE UP (ref 150–400)
POTASSIUM SERPL-MCNC: 3.5 MMOL/L — SIGNIFICANT CHANGE UP (ref 3.5–5.3)
POTASSIUM SERPL-SCNC: 3.5 MMOL/L — SIGNIFICANT CHANGE UP (ref 3.5–5.3)
PROT SERPL-MCNC: 9.2 GM/DL — HIGH (ref 6–8.3)
PROT UR-MCNC: 30 MG/DL
PROTHROM AB SERPL-ACNC: 12.5 SEC — SIGNIFICANT CHANGE UP (ref 10–12.9)
RBC # BLD: 5.59 M/UL — SIGNIFICANT CHANGE UP (ref 4.2–5.8)
RBC # FLD: 15.5 % — HIGH (ref 10.3–14.5)
RBC CASTS # UR COMP ASSIST: SIGNIFICANT CHANGE UP /HPF (ref 0–4)
SODIUM SERPL-SCNC: 140 MMOL/L — SIGNIFICANT CHANGE UP (ref 135–145)
SP GR SPEC: 1.01 — SIGNIFICANT CHANGE UP (ref 1.01–1.02)
UROBILINOGEN FLD QL: NEGATIVE MG/DL — SIGNIFICANT CHANGE UP
WBC # BLD: 9.8 K/UL — SIGNIFICANT CHANGE UP (ref 3.8–10.5)
WBC # FLD AUTO: 9.8 K/UL — SIGNIFICANT CHANGE UP (ref 3.8–10.5)

## 2019-06-20 PROCEDURE — 99223 1ST HOSP IP/OBS HIGH 75: CPT

## 2019-06-20 PROCEDURE — 93010 ELECTROCARDIOGRAM REPORT: CPT

## 2019-06-20 PROCEDURE — 99285 EMERGENCY DEPT VISIT HI MDM: CPT | Mod: 25

## 2019-06-20 PROCEDURE — 74177 CT ABD & PELVIS W/CONTRAST: CPT | Mod: 26

## 2019-06-20 PROCEDURE — 71045 X-RAY EXAM CHEST 1 VIEW: CPT | Mod: 26

## 2019-06-20 RX ORDER — FOLIC ACID 0.8 MG
1 TABLET ORAL DAILY
Refills: 0 | Status: DISCONTINUED | OUTPATIENT
Start: 2019-06-20 | End: 2019-06-21

## 2019-06-20 RX ORDER — SODIUM CHLORIDE 9 MG/ML
2000 INJECTION INTRAMUSCULAR; INTRAVENOUS; SUBCUTANEOUS ONCE
Refills: 0 | Status: COMPLETED | OUTPATIENT
Start: 2019-06-20 | End: 2019-06-20

## 2019-06-20 RX ORDER — THIAMINE MONONITRATE (VIT B1) 100 MG
100 TABLET ORAL DAILY
Refills: 0 | Status: DISCONTINUED | OUTPATIENT
Start: 2019-06-20 | End: 2019-06-21

## 2019-06-20 RX ORDER — RISPERIDONE 4 MG/1
1 TABLET ORAL
Refills: 0 | Status: DISCONTINUED | OUTPATIENT
Start: 2019-06-20 | End: 2019-07-05

## 2019-06-20 RX ORDER — HYDROMORPHONE HYDROCHLORIDE 2 MG/ML
0.5 INJECTION INTRAMUSCULAR; INTRAVENOUS; SUBCUTANEOUS ONCE
Refills: 0 | Status: DISCONTINUED | OUTPATIENT
Start: 2019-06-20 | End: 2019-06-20

## 2019-06-20 RX ORDER — PANTOPRAZOLE SODIUM 20 MG/1
40 TABLET, DELAYED RELEASE ORAL EVERY 12 HOURS
Refills: 0 | Status: DISCONTINUED | OUTPATIENT
Start: 2019-06-20 | End: 2019-07-03

## 2019-06-20 RX ORDER — HYDROMORPHONE HYDROCHLORIDE 2 MG/ML
0.5 INJECTION INTRAMUSCULAR; INTRAVENOUS; SUBCUTANEOUS EVERY 4 HOURS
Refills: 0 | Status: DISCONTINUED | OUTPATIENT
Start: 2019-06-20 | End: 2019-06-20

## 2019-06-20 RX ORDER — SUCRALFATE 1 G
1 TABLET ORAL
Refills: 0 | Status: DISCONTINUED | OUTPATIENT
Start: 2019-06-20 | End: 2019-06-21

## 2019-06-20 RX ORDER — ONDANSETRON 8 MG/1
4 TABLET, FILM COATED ORAL ONCE
Refills: 0 | Status: COMPLETED | OUTPATIENT
Start: 2019-06-20 | End: 2019-06-20

## 2019-06-20 RX ORDER — PANTOPRAZOLE SODIUM 20 MG/1
80 TABLET, DELAYED RELEASE ORAL ONCE
Refills: 0 | Status: COMPLETED | OUTPATIENT
Start: 2019-06-20 | End: 2019-06-20

## 2019-06-20 RX ORDER — SODIUM CHLORIDE 9 MG/ML
1000 INJECTION, SOLUTION INTRAVENOUS
Refills: 0 | Status: COMPLETED | OUTPATIENT
Start: 2019-06-20 | End: 2019-06-20

## 2019-06-20 RX ORDER — SODIUM CHLORIDE 9 MG/ML
1000 INJECTION INTRAMUSCULAR; INTRAVENOUS; SUBCUTANEOUS ONCE
Refills: 0 | Status: COMPLETED | OUTPATIENT
Start: 2019-06-20 | End: 2019-06-20

## 2019-06-20 RX ADMIN — Medication 1 TABLET(S): at 13:57

## 2019-06-20 RX ADMIN — SODIUM CHLORIDE 1000 MILLILITER(S): 9 INJECTION INTRAMUSCULAR; INTRAVENOUS; SUBCUTANEOUS at 08:23

## 2019-06-20 RX ADMIN — SODIUM CHLORIDE 2000 MILLILITER(S): 9 INJECTION INTRAMUSCULAR; INTRAVENOUS; SUBCUTANEOUS at 12:52

## 2019-06-20 RX ADMIN — Medication 1 MILLIGRAM(S): at 13:57

## 2019-06-20 RX ADMIN — HYDROMORPHONE HYDROCHLORIDE 0.5 MILLIGRAM(S): 2 INJECTION INTRAMUSCULAR; INTRAVENOUS; SUBCUTANEOUS at 13:58

## 2019-06-20 RX ADMIN — HYDROMORPHONE HYDROCHLORIDE 0.5 MILLIGRAM(S): 2 INJECTION INTRAMUSCULAR; INTRAVENOUS; SUBCUTANEOUS at 07:53

## 2019-06-20 RX ADMIN — SODIUM CHLORIDE 1000 MILLILITER(S): 9 INJECTION INTRAMUSCULAR; INTRAVENOUS; SUBCUTANEOUS at 07:36

## 2019-06-20 RX ADMIN — Medication 1.5 MILLIGRAM(S): at 22:39

## 2019-06-20 RX ADMIN — RISPERIDONE 1 MILLIGRAM(S): 4 TABLET ORAL at 13:57

## 2019-06-20 RX ADMIN — ONDANSETRON 4 MILLIGRAM(S): 8 TABLET, FILM COATED ORAL at 07:11

## 2019-06-20 RX ADMIN — PANTOPRAZOLE SODIUM 80 MILLIGRAM(S): 20 TABLET, DELAYED RELEASE ORAL at 07:11

## 2019-06-20 RX ADMIN — Medication 1 GRAM(S): at 23:09

## 2019-06-20 RX ADMIN — PANTOPRAZOLE SODIUM 40 MILLIGRAM(S): 20 TABLET, DELAYED RELEASE ORAL at 18:15

## 2019-06-20 RX ADMIN — HYDROMORPHONE HYDROCHLORIDE 0.5 MILLIGRAM(S): 2 INJECTION INTRAMUSCULAR; INTRAVENOUS; SUBCUTANEOUS at 14:13

## 2019-06-20 RX ADMIN — Medication 1.5 MILLIGRAM(S): at 18:15

## 2019-06-20 RX ADMIN — Medication 1 GRAM(S): at 18:15

## 2019-06-20 RX ADMIN — Medication 1.5 MILLIGRAM(S): at 13:57

## 2019-06-20 RX ADMIN — SODIUM CHLORIDE 2000 MILLILITER(S): 9 INJECTION INTRAMUSCULAR; INTRAVENOUS; SUBCUTANEOUS at 08:22

## 2019-06-20 RX ADMIN — Medication 2 MILLIGRAM(S): at 07:11

## 2019-06-20 RX ADMIN — SODIUM CHLORIDE 125 MILLILITER(S): 9 INJECTION, SOLUTION INTRAVENOUS at 08:22

## 2019-06-20 NOTE — SBIRT NOTE ADULT - NSSBIRTBRIEFINTDET_GEN_A_CORE
pt is in severe denial of problem and minimizes effects of alcohol on physical condition.  positive reinforcement provided for the reported days that he is sober ( sometimes up to 3 in a week) so as to minimize stomach pain and his desire to feel better.

## 2019-06-20 NOTE — H&P ADULT - NSHPREVIEWOFSYSTEMS_GEN_ALL_CORE
CONSTITUTIONAL: No fever, weight loss, or fatigue  EYES: No eye pain, visual disturbances, or discharge  ENMT:  No difficulty hearing, tinnitus, vertigo; No sinus or throat pain  NECK: No pain or stiffness  RESPIRATORY: No cough, wheezing, chills or hemoptysis; No shortness of breath  CARDIOVASCULAR: No chest pain, palpitations, dizziness, or leg swelling  GASTROINTESTINAL: No abdominal or epigastric pain. No nausea, vomiting, or hematemesis; No diarrhea or constipation. No melena or hematochezia.  GENITOURINARY: No dysuria, frequency, hematuria, or incontinence  NEUROLOGICAL: No headaches, memory loss, loss of strength, numbness, or tremors  SKIN: No itching, burning, rashes, or lesions   LYMPH NODES: No enlarged glands  ENDOCRINE: No heat or cold intolerance; No hair loss  MUSCULOSKELETAL: No joint pain or swelling; No muscle, back, or extremity pain  PSYCHIATRIC: No depression, anxiety, mood swings, or difficulty sleeping  HEME/LYMPH: No easy bruising, or bleeding gums  ALLERGY AND IMMUNOLOGIC: No hives or eczema CONSTITUTIONAL: No fever, weight loss, or fatigue  EYES: No eye pain, visual disturbances, or discharge  ENMT:  No difficulty hearing, tinnitus, vertigo; No sinus or throat pain  NECK: No pain or stiffness  RESPIRATORY: No cough, wheezing, chills or hemoptysis; No shortness of breath  CARDIOVASCULAR: No chest pain, palpitations, dizziness, or leg swelling  GASTROINTESTINAL:see history of present illness   GENITOURINARY: No dysuria, frequency, hematuria, or incontinence  NEUROLOGICAL: No headaches, memory loss, loss of strength, numbness, or tremors  SKIN: No itching, burning, rashes, or lesions   LYMPH NODES: No enlarged glands  ENDOCRINE: No heat or cold intolerance; No hair loss  MUSCULOSKELETAL: No joint pain or swelling; No muscle, back, or extremity pain  PSYCHIATRIC: No depression, anxiety, mood swings, or difficulty sleeping  HEME/LYMPH: No easy bruising, or bleeding gums  ALLERGY AND IMMUNOLOGIC: No hives or eczema

## 2019-06-20 NOTE — SBIRT NOTE ADULT - NSSBIRTALCNOACTINTDET_GEN_A_CORE
Education and positive reinforcement for any healthy decisions made. m Pt rejects any efforts or suggestions for help.  Says that he plans to go to [place].

## 2019-06-20 NOTE — H&P ADULT - HISTORY OF PRESENT ILLNESS
55 yo m with pmh of alcohol abuse and pud presents in ED c/o shakiness, bloody vomit and black "hard" stool the last few days. patient reports he has not drank in 2 days. No fever/chills, No photophobia/eye pain/changes in vision, No ear pain/sore throat/dysphagia, No chest pain/palpitations, no SOB/cough/wheeze/stridor, No abdominal pain, No D, no dysuria/frequency/discharge, No neck/back pain, no rash, no changes in neurological status/function. 53 yo m with pmh of alcohol abuse and pud presents in ED c/o shakiness, bloody vomit and black "hard" stool the last few days. patient reports he has not drank in 2 days. No fever/chills, No photophobia/eye pain/changes in vision, No ear pain/sore throat/dysphagia, No chest pain/palpitations, no SOB/cough/wheeze/stridor, No abdominal pain, No D, no dysuria/frequency/discharge, No neck/back pain, no rash, no changes in neurological status/function. he was admitted two weeks ago for the same problem and had endoscopy that showed   ESOPHAGUS:  Mild narrowing in UES ( minimal tension necessary ), Superficial ulceration / esophagitis lower 1/2 of esophagus s/p biopsy    STOMACH: mild antral gastritis s/p biopsy    DUODENUM: WNL  and was discharged on a ppi and carfate but continued to drink and only stoppede two days ago

## 2019-06-20 NOTE — ED PROVIDER NOTE - CLINICAL SUMMARY MEDICAL DECISION MAKING FREE TEXT BOX
pending labs, imaging and admission. Patient care transitioned to incoming team.  All decisions regarding the progression of care will be made at their discretion.

## 2019-06-20 NOTE — ED ADULT TRIAGE NOTE - CHIEF COMPLAINT QUOTE
pt c/o abd pain, vomiting blood, dark bloody stools x 2 days.  (PMH- gastritis).  last drink 2 days ago.

## 2019-06-20 NOTE — ED PROVIDER NOTE - OBJECTIVE STATEMENT
Pertinent PMH/PSH/FHx/SHx and Review of Systems contained within:  55 yo m with pmh of alcohol abuse and pud presents in ED c/o shakiness, bloody vomit and black "hard" stool the last few days. patient reports he has not drank in 2 days. No fever/chills, No photophobia/eye pain/changes in vision, No ear pain/sore throat/dysphagia, No chest pain/palpitations, no SOB/cough/wheeze/stridor, No abdominal pain, No D, no dysuria/frequency/discharge, No neck/back pain, no rash, no changes in neurological status/function.

## 2019-06-20 NOTE — ED ADULT NURSE NOTE - OBJECTIVE STATEMENT
54y male received in bed 13 c/o of abdominal pain, tremors, N/V x yesterday. States he drank 1 pint of vodka 2 days dry heaving, actively vomiting while interviewing pt. able to complete full sentences without discomfort. no s/s of acute distress noted. will continue to monitor and provide care as needed.

## 2019-06-20 NOTE — CONSULT NOTE ADULT - SUBJECTIVE AND OBJECTIVE BOX
HPI:  53 yo m with pmh of alcohol abuse and pud presents in ED c/o shakiness, bloody vomit and black "hard" stool the last few days. patient reports he has not drank in 2 days. No fever/chills, No photophobia/eye pain/changes in vision, No ear pain/sore throat/dysphagia, No chest pain/palpitations, no SOB/cough/wheeze/stridor, No abdominal pain, No D, no dysuria/frequency/discharge, No neck/back pain, no rash, no changes in neurological status/function. he was admitted two weeks ago for the same problem and had endoscopy that showed   ESOPHAGUS:  Mild narrowing in UES ( minimal tension necessary ), Superficial ulceration / esophagitis lower 1/2 of esophagus s/p biopsy    STOMACH: mild antral gastritis s/p biopsy    DUODENUM: WNL  and was discharged on a ppi and carfate but continued to drink and only stoppede two days ago (2019 12:26)  ------------------- As Above ------------------------------------  Patient is a poor historian. Patient states that he had been vomiting red / black x 2 days. A/W abdominal pain. He states that he stopped drinking for two days. Patient refuses to tell me how much he was drinking. Patient denies melena and hematochezia but told the ER staff that he had black stool. ( ER reports the patient had heme (-) stool.  The patient states that he was vomiting blood in the ER but the nurse states he is only spitting mucus.   Patient recently in [facility] VS with ETOH abuse and upper GI bleed. See EGD report above   See labs.      PAST MEDICAL & SURGICAL HISTORY:  EtOH dependence  Mixed hyperlipidemia  PUD (peptic ulcer disease)  No significant past surgical history      MEDICATIONS  (STANDING):  folic acid 1 milliGRAM(s) Oral daily  LORazepam     Tablet 1.5 milliGRAM(s) Oral every 4 hours  LORazepam     Tablet   Oral   multivitamin 1 Tablet(s) Oral daily  pantoprazole  Injectable 40 milliGRAM(s) IV Push every 12 hours  risperiDONE   Tablet 1 milliGRAM(s) Oral two times a day  sucralfate 1 Gram(s) Oral four times a day  thiamine 100 milliGRAM(s) Oral daily    MEDICATIONS  (PRN):  HYDROmorphone  Injectable 0.5 milliGRAM(s) IV Push every 4 hours PRN Severe Pain (7 - 10)      Allergies    No Known Allergies    Intolerances        FAMILY HISTORY:  No pertinent family history in first degree relatives (Father, Mother)      REVIEW OF SYSTEMS:    CONSTITUTIONAL: No fever, weight loss, or fatigue  EYES: No eye pain, visual disturbances, or discharge  ENMT:  No difficulty hearing, tinnitus, vertigo; No sinus or throat pain  NECK: No pain or stiffness  BREASTS: No pain, masses, or nipple discharge  RESPIRATORY: No cough, wheezing, chills or hemoptysis; No shortness of breath  CARDIOVASCULAR: No chest pain, palpitations, dizziness, or leg swelling  GASTROINTESTINAL: See above  GENITOURINARY: No dysuria, frequency, hematuria, or incontinence  NEUROLOGICAL: No headaches, memory loss, loss of strength, numbness, or tremors  SKIN: No itching, burning, rashes, or lesions   LYMPH NODES: No enlarged glands  ENDOCRINE: No heat or cold intolerance; No hair loss  MUSCULOSKELETAL: No joint pain or swelling; No muscle, back, or extremity pain  PSYCHIATRIC: No depression, anxiety, mood swings, or difficulty sleeping  HEME/LYMPH: No easy bruising, or bleeding gums  ALLERGY AND IMMUNOLOGIC: No hives or eczema          SOCIAL HISTORY:    FAMILY HISTORY:  No pertinent family history in first degree relatives (Father, Mother)      Vital Signs Last 24 Hrs  T(C): 36.4 (2019 15:54), Max: 36.7 (2019 06:25)  T(F): 97.5 (2019 15:54), Max: 98.1 (2019 06:25)  HR: 68 (2019 15:54) (68 - 136)  BP: 122/67 (2019 15:54) (121/68 - 139/95)  BP(mean): --  RR: 19 (2019 15:54) (18 - 20)  SpO2: 98% (2019 15:54) (94% - 99%)    PHYSICAL EXAM:    GENERAL: NAD, well-groomed, well-developed  HEAD:  Atraumatic, Normocephalic  EYES: EOMI, PERRLA, conjunctiva and sclera clear  NECK: Supple, No JVD, Normal thyroid  NERVOUS SYSTEM:  Alert & Oriented X2 Good concentration; Motor Strength 5/5 B/L upper and lower extremities;   CHEST/LUNG: Clear to percussion bilaterally; No rales, rhonchi, wheezing, or rubs  HEART: Regular rate and rhythm; No murmurs, rubs, or gallops  ABDOMEN: Soft, Nontender, Nondistended; Bowel sounds present  EXTREMITIES:  2+ Peripheral Pulses, No clubbing, cyanosis, or edema  LYMPH: No lymphadenopathy noted   RECTAL: Refused   SKIN: No rashes or lesions    LABS:                        15.4   9.80  )-----------( 270      ( 2019 07:03 )             46.8       CBC:   @ 07:03  WBC  9.80  HGB 15.4  HCT 46.8 Plate 270  MCV 83.7           2019 07:03    140    |  94     |  11     ----------------------------<  202    3.5     |  29     |  1.19     Ca    10.0       2019 07:03  Mg     1.8       2019 10:42    TPro  9.2    /  Alb  4.6    /  TBili  1.1    /  DBili  x      /  AST  46     /  ALT  49     /  AlkPhos  55     2019 07:03    PT/INR - ( 2019 10:42 )   PT: 12.5 sec;   INR: 1.11 ratio         PTT - ( 2019 10:42 )  PTT:26.8 sec  Urinalysis Basic - ( 2019 11:17 )    Color: Yellow / Appearance: Clear / S.010 / pH: x  Gluc: x / Ketone: Moderate  / Bili: Negative / Urobili: Negative mg/dL   Blood: x / Protein: 30 mg/dL / Nitrite: Negative   Leuk Esterase: Negative / RBC: 0-2 /HPF / WBC x   Sq Epi: x / Non Sq Epi: Few / Bacteria: x          RADIOLOGY & ADDITIONAL STUDIES: HPI:  53 yo m with pmh of alcohol abuse and pud presents in ED c/o shakiness, bloody vomit and black "hard" stool the last few days. patient reports he has not drank in 2 days. No fever/chills, No photophobia/eye pain/changes in vision, No ear pain/sore throat/dysphagia, No chest pain/palpitations, no SOB/cough/wheeze/stridor, No abdominal pain, No D, no dysuria/frequency/discharge, No neck/back pain, no rash, no changes in neurological status/function. he was admitted two weeks ago for the same problem and had endoscopy that showed   ESOPHAGUS:  Mild narrowing in UES ( minimal tension necessary ), Superficial ulceration / esophagitis lower 1/2 of esophagus s/p biopsy    STOMACH: mild antral gastritis s/p biopsy    DUODENUM: WNL  and was discharged on a ppi and carfate but continued to drink and only stoppede two days ago (2019 12:26)  ------------------- As Above ------------------------------------  Patient is a poor historian. Patient states that he had been vomiting red / black x 2 days. A/W abdominal pain. He states that he stopped drinking for two days. Patient refuses to tell me how much he was drinking. Patient denies melena and hematochezia but told the ER staff that he had black stool. ( ER reports the patient had heme (-) stool.  The patient states that he was vomiting blood in the ER but the nurse states he is only spitting mucus.   Patient recently in [facility] with ETOH abuse and upper GI bleed. See EGD report above   See labs. / CT scan       PAST MEDICAL & SURGICAL HISTORY:  EtOH dependence  Mixed hyperlipidemia  PUD (peptic ulcer disease)  No significant past surgical history      MEDICATIONS  (STANDING):  folic acid 1 milliGRAM(s) Oral daily  LORazepam     Tablet 1.5 milliGRAM(s) Oral every 4 hours  LORazepam     Tablet   Oral   multivitamin 1 Tablet(s) Oral daily  pantoprazole  Injectable 40 milliGRAM(s) IV Push every 12 hours  risperiDONE   Tablet 1 milliGRAM(s) Oral two times a day  sucralfate 1 Gram(s) Oral four times a day  thiamine 100 milliGRAM(s) Oral daily    MEDICATIONS  (PRN):  HYDROmorphone  Injectable 0.5 milliGRAM(s) IV Push every 4 hours PRN Severe Pain (7 - 10)      Allergies    No Known Allergies    Intolerances        FAMILY HISTORY:  No pertinent family history in first degree relatives (Father, Mother)      REVIEW OF SYSTEMS:    CONSTITUTIONAL: No fever, weight loss, or fatigue  EYES: No eye pain, visual disturbances, or discharge  ENMT:  No difficulty hearing, tinnitus, vertigo; No sinus or throat pain  NECK: No pain or stiffness  BREASTS: No pain, masses, or nipple discharge  RESPIRATORY: No cough, wheezing, chills or hemoptysis; No shortness of breath  CARDIOVASCULAR: No chest pain, palpitations, dizziness, or leg swelling  GASTROINTESTINAL: See above  GENITOURINARY: No dysuria, frequency, hematuria, or incontinence  NEUROLOGICAL: No headaches, memory loss, loss of strength, numbness, or tremors  SKIN: No itching, burning, rashes, or lesions   LYMPH NODES: No enlarged glands  ENDOCRINE: No heat or cold intolerance; No hair loss  MUSCULOSKELETAL: No joint pain or swelling; No muscle, back, or extremity pain  PSYCHIATRIC: No depression, anxiety, mood swings, or difficulty sleeping  HEME/LYMPH: No easy bruising, or bleeding gums  ALLERGY AND IMMUNOLOGIC: No hives or eczema          SOCIAL HISTORY:    FAMILY HISTORY:  No pertinent family history in first degree relatives (Father, Mother)      Vital Signs Last 24 Hrs  T(C): 36.4 (2019 15:54), Max: 36.7 (2019 06:25)  T(F): 97.5 (2019 15:54), Max: 98.1 (2019 06:25)  HR: 68 (2019 15:54) (68 - 136)  BP: 122/67 (2019 15:54) (121/68 - 139/95)  BP(mean): --  RR: 19 (2019 15:54) (18 - 20)  SpO2: 98% (2019 15:54) (94% - 99%)    PHYSICAL EXAM:    GENERAL: NAD, well-groomed, well-developed  HEAD:  Atraumatic, Normocephalic  EYES: EOMI, PERRLA, conjunctiva and sclera clear  NECK: Supple, No JVD, Normal thyroid  NERVOUS SYSTEM:  Alert & Oriented X2 Good concentration; Motor Strength 5/5 B/L upper and lower extremities;   CHEST/LUNG: Clear to percussion bilaterally; No rales, rhonchi, wheezing, or rubs  HEART: Regular rate and rhythm; No murmurs, rubs, or gallops  ABDOMEN: Soft, Nontender, Nondistended; Bowel sounds present  EXTREMITIES:  2+ Peripheral Pulses, No clubbing, cyanosis, or edema  LYMPH: No lymphadenopathy noted   RECTAL: Refused   SKIN: No rashes or lesions    LABS:                        15.4   9.80  )-----------( 270      ( 2019 07:03 )             46.8       CBC:   @ 07:03  WBC  9.80  HGB 15.4  HCT 46.8 Plate 270  MCV 83.7           2019 07:03    140    |  94     |  11     ----------------------------<  202    3.5     |  29     |  1.19     Ca    10.0       2019 07:03  Mg     1.8       2019 10:42    TPro  9.2    /  Alb  4.6    /  TBili  1.1    /  DBili  x      /  AST  46     /  ALT  49     /  AlkPhos  55     2019 07:03    PT/INR - ( 2019 10:42 )   PT: 12.5 sec;   INR: 1.11 ratio         PTT - ( 2019 10:42 )  PTT:26.8 sec  Urinalysis Basic - ( 2019 11:17 )    Color: Yellow / Appearance: Clear / S.010 / pH: x  Gluc: x / Ketone: Moderate  / Bili: Negative / Urobili: Negative mg/dL   Blood: x / Protein: 30 mg/dL / Nitrite: Negative   Leuk Esterase: Negative / RBC: 0-2 /HPF / WBC x   Sq Epi: x / Non Sq Epi: Few / Bacteria: x          RADIOLOGY & ADDITIONAL STUDIES:  < from: CT Abdomen and Pelvis w/ IV Cont (19 @ 09:13) >    EXAM:  CT ABDOMEN AND PELVIS IC                            PROCEDURE DATE:  2019          INTERPRETATION:  CLINICAL INFORMATION: Bloody vomiting and stools    COMPARISON: 2019    PROCEDURE:   CT of the Abdomen and Pelvis was performed with intravenous contrast.   Intravenous contrast: 85 ml Omnipaque 350. 15 ml discarded.  Oral contrast: None.  Sagittal and coronal reformats were performed.    FINDINGS:    LOWER CHEST: Clear lung bases.    LIVER: Low-attenuation, compatible with hepatic steatosis.  BILE DUCTS: Normal caliber.  GALLBLADDER: Within normal limits.  SPLEEN: Within normal limits.  PANCREAS: Within normal limits.  ADRENALS: 7 mm right adrenal myelolipoma.  KIDNEYS/URETERS: Subcentimeter hypodense left renal lesion, too small to   further characterize. No hydronephrosis.    BLADDER: Within normal limits.  REPRODUCTIVE ORGANS: Prostate within normal limits.    BOWEL: Distal esophagus and gastric wall thickening. No bowel   obstruction. Appendix within normal limits. Several colonic diverticula.  PERITONEUM: No ascites.  VESSELS:  Atherosclerotic changes.  RETROPERITONEUM: No lymphadenopathy.    ABDOMINAL WALL: Tiny fat-containing umbilical hernia.  BONES: Mild spinal degenerative changes.    IMPRESSION:     No bowel obstruction or CT evidence of colitis.  Distal esophageal and gastric wall thickening, question esophagitis and   gastritis.                  FERNY MORE M.D., ATTENDING RADIOLOGIST  This document has been electronically signed. 2019  9:31AM              < end of copied text >

## 2019-06-20 NOTE — ED ADULT NURSE NOTE - NSIMPLEMENTINTERV_GEN_ALL_ED
Implemented All Fall Risk Interventions:  [place] to call system. Call bell, personal items and telephone within reach. Instruct patient to call for assistance. Room bathroom lighting operational. Non-slip footwear when patient is off stretcher. Physically safe environment: no spills, clutter or unnecessary equipment. Stretcher in lowest position, wheels locked, appropriate side rails in place. Provide visual cue, wrist band, yellow gown, etc. Monitor gait and stability. Monitor for mental status changes and reorient to person, place, and time. Review medications for side effects contributing to fall risk. Reinforce activity limits and safety measures with patient and family.

## 2019-06-20 NOTE — ED PROVIDER NOTE - PROGRESS NOTE DETAILS
Pt was seen and treated by Dr. Baeza. Pt is alert and oriented x 3. Pt has CIWA score of 27.  no active vomiting and passed dark stool x 2 days. pt also sts his last drink was 2 days ago. bed side bp 131/85 hr 114, rr 18 pox 97 % in room air. Dr. Serrano gastroenterologist who performed endoscopy on pt on June, 7, 2019 is called and left message thru his service. CIWA score is 10 lactate 1.3 now. Dr. Smiley was notified and admit pt,

## 2019-06-20 NOTE — ED ADULT NURSE NOTE - NS ED NOTE ABUSE RESPONSE YN
Not walking yet  May do so with no touchdown right foot  Dressings changed  STSG 100% pink  Right Achilles area  Only tip of flap, about 1 cm  Black  Rest will be viable, and enough to cover exposed Achilles  Rt thigh erythema decreased at donor site of ALT flap  Yes

## 2019-06-20 NOTE — H&P ADULT - ASSESSMENT
54m with history of alchohol abuse presents with vomiting blood       IMPROVE VTE Individual Risk Assessment          RISK                                                          Points  [  ] Previous VTE                                                3  [  ] Thrombophilia                                             2  [  ] Lower limb paralysis                                   2        (unable to hold up >15 seconds)    [  ] Current Cancer                                             2         (within 6 months)  [  ] Immobilization > 24 hrs                              1  [  ] ICU/CCU stay > 24 hours                             1  [  ] Age > 60                                                         1    IMPROVE VTE Score: 0 54m with history of alchohol abuse presents with vomiting blood with elevated ciwa score       IMPROVE VTE Individual Risk Assessment          RISK                                                          Points  [  ] Previous VTE                                                3  [  ] Thrombophilia                                             2  [  ] Lower limb paralysis                                   2        (unable to hold up >15 seconds)    [  ] Current Cancer                                             2         (within 6 months)  [  ] Immobilization > 24 hrs                              1  [  ] ICU/CCU stay > 24 hours                             1  [  ] Age > 60                                                         1    IMPROVE VTE Score: 0

## 2019-06-20 NOTE — H&P ADULT - PROBLEM SELECTOR PLAN 3
guiac NEGATIVE   hgb 15 -  continue protonix and restart carafate- no indication for rescoping as it was only done two weeks ago

## 2019-06-20 NOTE — H&P ADULT - NSHPPHYSICALEXAM_GEN_ALL_CORE
ICU Vital Signs Last 24 Hrs  T(C): 36.7 (20 Jun 2019 06:25), Max: 36.7 (20 Jun 2019 06:25)  T(F): 98.1 (20 Jun 2019 06:25), Max: 98.1 (20 Jun 2019 06:25)  HR: 83 (20 Jun 2019 11:30) (83 - 136)  BP: 121/68 (20 Jun 2019 11:30) (121/68 - 139/95)  BP(mean): --  ABP: --  ABP(mean): --  RR: 20 (20 Jun 2019 11:30) (20 - 20)  SpO2: 94% (20 Jun 2019 11:30) (94% - 99%)  GENERAL: NAD well-developed  HEAD:  Atraumatic, Normocephalic  EYES: EOMI, PERRLA, conjunctiva and sclera clear  ENMT: No tonsillar erythema, exudates, or enlargement; Moist mucous membranes, Good dentition, No lesions  NECK: Supple, No JVD, Normal thyroid  NERVOUS SYSTEM:  Alert & Oriented X3, Good concentration; Motor Strength 5/5 B/L upper and lower extremities; DTRs 2+ intact and symmetric  CHEST/LUNG: Clear to percussion bilaterally; No rales, rhonchi, wheezing, or rubs  HEART: Regular rate and rhythm; No murmurs, rubs, or gallops  ABDOMEN: Soft, Nontender, Nondistended; Bowel sounds present  EXTREMITIES:  2+ Peripheral Pulses, No clubbing, cyanosis, or edema  LYMPH: No lymphadenopathy   SKIN: No rashes or lesions

## 2019-06-20 NOTE — ED PROVIDER NOTE - CONSTITUTIONAL, MLM
normal... tremulous, awake, alert, oriented to person, place, time/situation and in no apparent distress.

## 2019-06-21 LAB
ALBUMIN SERPL ELPH-MCNC: 3.4 G/DL — SIGNIFICANT CHANGE UP (ref 3.3–5)
ALBUMIN SERPL ELPH-MCNC: 3.7 G/DL — SIGNIFICANT CHANGE UP (ref 3.3–5)
ALP SERPL-CCNC: 39 U/L — LOW (ref 40–120)
ALP SERPL-CCNC: 39 U/L — LOW (ref 40–120)
ALT FLD-CCNC: 33 U/L — SIGNIFICANT CHANGE UP (ref 12–78)
ALT FLD-CCNC: 34 U/L — SIGNIFICANT CHANGE UP (ref 12–78)
ANION GAP SERPL CALC-SCNC: 11 MMOL/L — SIGNIFICANT CHANGE UP (ref 5–17)
ANION GAP SERPL CALC-SCNC: 8 MMOL/L — SIGNIFICANT CHANGE UP (ref 5–17)
ANION GAP SERPL CALC-SCNC: 9 MMOL/L — SIGNIFICANT CHANGE UP (ref 5–17)
AST SERPL-CCNC: 29 U/L — SIGNIFICANT CHANGE UP (ref 15–37)
AST SERPL-CCNC: 34 U/L — SIGNIFICANT CHANGE UP (ref 15–37)
BILIRUB DIRECT SERPL-MCNC: 0.23 MG/DL — HIGH (ref 0.05–0.2)
BILIRUB DIRECT SERPL-MCNC: 0.34 MG/DL — HIGH (ref 0.05–0.2)
BILIRUB INDIRECT FLD-MCNC: 0.9 MG/DL — SIGNIFICANT CHANGE UP (ref 0.2–1)
BILIRUB INDIRECT FLD-MCNC: 1 MG/DL — SIGNIFICANT CHANGE UP (ref 0.2–1)
BILIRUB SERPL-MCNC: 1.1 MG/DL — SIGNIFICANT CHANGE UP (ref 0.2–1.2)
BILIRUB SERPL-MCNC: 1.3 MG/DL — HIGH (ref 0.2–1.2)
BUN SERPL-MCNC: 7 MG/DL — SIGNIFICANT CHANGE UP (ref 7–23)
BUN SERPL-MCNC: 7 MG/DL — SIGNIFICANT CHANGE UP (ref 7–23)
BUN SERPL-MCNC: 8 MG/DL — SIGNIFICANT CHANGE UP (ref 7–23)
CALCIUM SERPL-MCNC: 8.1 MG/DL — LOW (ref 8.5–10.1)
CALCIUM SERPL-MCNC: 8.2 MG/DL — LOW (ref 8.5–10.1)
CALCIUM SERPL-MCNC: 8.5 MG/DL — SIGNIFICANT CHANGE UP (ref 8.5–10.1)
CHLORIDE SERPL-SCNC: 106 MMOL/L — SIGNIFICANT CHANGE UP (ref 96–108)
CHLORIDE SERPL-SCNC: 106 MMOL/L — SIGNIFICANT CHANGE UP (ref 96–108)
CHLORIDE SERPL-SCNC: 108 MMOL/L — SIGNIFICANT CHANGE UP (ref 96–108)
CO2 SERPL-SCNC: 23 MMOL/L — SIGNIFICANT CHANGE UP (ref 22–31)
CO2 SERPL-SCNC: 26 MMOL/L — SIGNIFICANT CHANGE UP (ref 22–31)
CO2 SERPL-SCNC: 27 MMOL/L — SIGNIFICANT CHANGE UP (ref 22–31)
CREAT SERPL-MCNC: 0.87 MG/DL — SIGNIFICANT CHANGE UP (ref 0.5–1.3)
CREAT SERPL-MCNC: 0.93 MG/DL — SIGNIFICANT CHANGE UP (ref 0.5–1.3)
CREAT SERPL-MCNC: 1.05 MG/DL — SIGNIFICANT CHANGE UP (ref 0.5–1.3)
GLUCOSE SERPL-MCNC: 103 MG/DL — HIGH (ref 70–99)
GLUCOSE SERPL-MCNC: 129 MG/DL — HIGH (ref 70–99)
GLUCOSE SERPL-MCNC: 75 MG/DL — SIGNIFICANT CHANGE UP (ref 70–99)
HCT VFR BLD CALC: 35 % — LOW (ref 39–50)
HCT VFR BLD CALC: 38.4 % — LOW (ref 39–50)
HCT VFR BLD CALC: 38.6 % — LOW (ref 39–50)
HGB BLD-MCNC: 11.4 G/DL — LOW (ref 13–17)
HGB BLD-MCNC: 12.2 G/DL — LOW (ref 13–17)
HGB BLD-MCNC: 12.3 G/DL — LOW (ref 13–17)
MAGNESIUM SERPL-MCNC: 1.9 MG/DL — SIGNIFICANT CHANGE UP (ref 1.6–2.6)
MAGNESIUM SERPL-MCNC: 2 MG/DL — SIGNIFICANT CHANGE UP (ref 1.6–2.6)
MAGNESIUM SERPL-MCNC: 2.1 MG/DL — SIGNIFICANT CHANGE UP (ref 1.6–2.6)
MCHC RBC-ENTMCNC: 27.9 PG — SIGNIFICANT CHANGE UP (ref 27–34)
MCHC RBC-ENTMCNC: 28.2 PG — SIGNIFICANT CHANGE UP (ref 27–34)
MCHC RBC-ENTMCNC: 28.4 PG — SIGNIFICANT CHANGE UP (ref 27–34)
MCHC RBC-ENTMCNC: 31.8 GM/DL — LOW (ref 32–36)
MCHC RBC-ENTMCNC: 31.9 GM/DL — LOW (ref 32–36)
MCHC RBC-ENTMCNC: 32.6 GM/DL — SIGNIFICANT CHANGE UP (ref 32–36)
MCV RBC AUTO: 87.3 FL — SIGNIFICANT CHANGE UP (ref 80–100)
MCV RBC AUTO: 87.7 FL — SIGNIFICANT CHANGE UP (ref 80–100)
MCV RBC AUTO: 88.5 FL — SIGNIFICANT CHANGE UP (ref 80–100)
NRBC # BLD: 0 /100 WBCS — SIGNIFICANT CHANGE UP (ref 0–0)
PHOSPHATE SERPL-MCNC: 1.5 MG/DL — LOW (ref 2.5–4.5)
PHOSPHATE SERPL-MCNC: 1.8 MG/DL — LOW (ref 2.5–4.5)
PHOSPHATE SERPL-MCNC: 1.8 MG/DL — LOW (ref 2.5–4.5)
PLATELET # BLD AUTO: 161 K/UL — SIGNIFICANT CHANGE UP (ref 150–400)
PLATELET # BLD AUTO: 187 K/UL — SIGNIFICANT CHANGE UP (ref 150–400)
PLATELET # BLD AUTO: 201 K/UL — SIGNIFICANT CHANGE UP (ref 150–400)
POTASSIUM SERPL-MCNC: 3.3 MMOL/L — LOW (ref 3.5–5.3)
POTASSIUM SERPL-MCNC: 3.3 MMOL/L — LOW (ref 3.5–5.3)
POTASSIUM SERPL-MCNC: 3.5 MMOL/L — SIGNIFICANT CHANGE UP (ref 3.5–5.3)
POTASSIUM SERPL-SCNC: 3.3 MMOL/L — LOW (ref 3.5–5.3)
POTASSIUM SERPL-SCNC: 3.3 MMOL/L — LOW (ref 3.5–5.3)
POTASSIUM SERPL-SCNC: 3.5 MMOL/L — SIGNIFICANT CHANGE UP (ref 3.5–5.3)
PROT SERPL-MCNC: 7.1 GM/DL — SIGNIFICANT CHANGE UP (ref 6–8.3)
PROT SERPL-MCNC: 7.2 GM/DL — SIGNIFICANT CHANGE UP (ref 6–8.3)
RBC # BLD: 4.01 M/UL — LOW (ref 4.2–5.8)
RBC # BLD: 4.36 M/UL — SIGNIFICANT CHANGE UP (ref 4.2–5.8)
RBC # BLD: 4.38 M/UL — SIGNIFICANT CHANGE UP (ref 4.2–5.8)
RBC # FLD: 15.1 % — HIGH (ref 10.3–14.5)
RBC # FLD: 15.3 % — HIGH (ref 10.3–14.5)
RBC # FLD: 15.5 % — HIGH (ref 10.3–14.5)
SODIUM SERPL-SCNC: 140 MMOL/L — SIGNIFICANT CHANGE UP (ref 135–145)
SODIUM SERPL-SCNC: 142 MMOL/L — SIGNIFICANT CHANGE UP (ref 135–145)
SODIUM SERPL-SCNC: 142 MMOL/L — SIGNIFICANT CHANGE UP (ref 135–145)
WBC # BLD: 4.6 K/UL — SIGNIFICANT CHANGE UP (ref 3.8–10.5)
WBC # BLD: 4.64 K/UL — SIGNIFICANT CHANGE UP (ref 3.8–10.5)
WBC # BLD: 5.91 K/UL — SIGNIFICANT CHANGE UP (ref 3.8–10.5)
WBC # FLD AUTO: 4.6 K/UL — SIGNIFICANT CHANGE UP (ref 3.8–10.5)
WBC # FLD AUTO: 4.64 K/UL — SIGNIFICANT CHANGE UP (ref 3.8–10.5)
WBC # FLD AUTO: 5.91 K/UL — SIGNIFICANT CHANGE UP (ref 3.8–10.5)

## 2019-06-21 RX ORDER — CHLORHEXIDINE GLUCONATE 213 G/1000ML
1 SOLUTION TOPICAL
Refills: 0 | Status: DISCONTINUED | OUTPATIENT
Start: 2019-06-21 | End: 2019-07-08

## 2019-06-21 RX ORDER — POTASSIUM PHOSPHATE, MONOBASIC POTASSIUM PHOSPHATE, DIBASIC 236; 224 MG/ML; MG/ML
15 INJECTION, SOLUTION INTRAVENOUS ONCE
Refills: 0 | Status: COMPLETED | OUTPATIENT
Start: 2019-06-21 | End: 2019-06-21

## 2019-06-21 RX ORDER — SUCRALFATE 1 G
1 TABLET ORAL
Refills: 0 | Status: DISCONTINUED | OUTPATIENT
Start: 2019-06-21 | End: 2019-07-08

## 2019-06-21 RX ORDER — DEXMEDETOMIDINE HYDROCHLORIDE IN 0.9% SODIUM CHLORIDE 4 UG/ML
0.2 INJECTION INTRAVENOUS
Qty: 200 | Refills: 0 | Status: DISCONTINUED | OUTPATIENT
Start: 2019-06-21 | End: 2019-06-24

## 2019-06-21 RX ORDER — HEPARIN SODIUM 5000 [USP'U]/ML
5000 INJECTION INTRAVENOUS; SUBCUTANEOUS EVERY 8 HOURS
Refills: 0 | Status: DISCONTINUED | OUTPATIENT
Start: 2019-06-21 | End: 2019-07-07

## 2019-06-21 RX ORDER — SODIUM CHLORIDE 9 MG/ML
1000 INJECTION, SOLUTION INTRAVENOUS
Refills: 0 | Status: DISCONTINUED | OUTPATIENT
Start: 2019-06-21 | End: 2019-06-24

## 2019-06-21 RX ORDER — PHENOBARBITAL 60 MG
130 TABLET ORAL ONCE
Refills: 0 | Status: DISCONTINUED | OUTPATIENT
Start: 2019-06-21 | End: 2019-06-21

## 2019-06-21 RX ORDER — PHENOBARBITAL 60 MG
260 TABLET ORAL ONCE
Refills: 0 | Status: DISCONTINUED | OUTPATIENT
Start: 2019-06-21 | End: 2019-06-21

## 2019-06-21 RX ORDER — PHENOBARBITAL 60 MG
260 TABLET ORAL
Refills: 0 | Status: DISCONTINUED | OUTPATIENT
Start: 2019-06-21 | End: 2019-06-25

## 2019-06-21 RX ORDER — PHENOBARBITAL 60 MG
130 TABLET ORAL
Refills: 0 | Status: DISCONTINUED | OUTPATIENT
Start: 2019-06-21 | End: 2019-06-28

## 2019-06-21 RX ADMIN — Medication 4 MILLIGRAM(S): at 18:34

## 2019-06-21 RX ADMIN — Medication 4 MILLIGRAM(S): at 09:59

## 2019-06-21 RX ADMIN — Medication 4 MILLIGRAM(S): at 05:20

## 2019-06-21 RX ADMIN — Medication 130 MILLIGRAM(S): at 07:12

## 2019-06-21 RX ADMIN — RISPERIDONE 1 MILLIGRAM(S): 4 TABLET ORAL at 06:38

## 2019-06-21 RX ADMIN — Medication 260 MILLIGRAM(S): at 11:15

## 2019-06-21 RX ADMIN — DEXMEDETOMIDINE HYDROCHLORIDE IN 0.9% SODIUM CHLORIDE 3.6 MICROGRAM(S)/KG/HR: 4 INJECTION INTRAVENOUS at 17:35

## 2019-06-21 RX ADMIN — HEPARIN SODIUM 5000 UNIT(S): 5000 INJECTION INTRAVENOUS; SUBCUTANEOUS at 21:58

## 2019-06-21 RX ADMIN — CHLORHEXIDINE GLUCONATE 1 APPLICATION(S): 213 SOLUTION TOPICAL at 13:39

## 2019-06-21 RX ADMIN — PANTOPRAZOLE SODIUM 40 MILLIGRAM(S): 20 TABLET, DELAYED RELEASE ORAL at 18:34

## 2019-06-21 RX ADMIN — Medication 4 MILLIGRAM(S): at 07:36

## 2019-06-21 RX ADMIN — Medication 4 MILLIGRAM(S): at 13:38

## 2019-06-21 RX ADMIN — Medication 130 MILLIGRAM(S): at 08:04

## 2019-06-21 RX ADMIN — Medication 4 MILLIGRAM(S): at 21:58

## 2019-06-21 RX ADMIN — Medication 130 MILLIGRAM(S): at 22:14

## 2019-06-21 RX ADMIN — Medication 1.5 MILLIGRAM(S): at 01:48

## 2019-06-21 RX ADMIN — HEPARIN SODIUM 5000 UNIT(S): 5000 INJECTION INTRAVENOUS; SUBCUTANEOUS at 13:38

## 2019-06-21 RX ADMIN — SODIUM CHLORIDE 42 MILLILITER(S): 9 INJECTION, SOLUTION INTRAVENOUS at 16:04

## 2019-06-21 RX ADMIN — Medication 1 GRAM(S): at 06:38

## 2019-06-21 RX ADMIN — DEXMEDETOMIDINE HYDROCHLORIDE IN 0.9% SODIUM CHLORIDE 3.6 MICROGRAM(S)/KG/HR: 4 INJECTION INTRAVENOUS at 11:15

## 2019-06-21 RX ADMIN — PANTOPRAZOLE SODIUM 40 MILLIGRAM(S): 20 TABLET, DELAYED RELEASE ORAL at 06:38

## 2019-06-21 RX ADMIN — POTASSIUM PHOSPHATE, MONOBASIC POTASSIUM PHOSPHATE, DIBASIC 62.5 MILLIMOLE(S): 236; 224 INJECTION, SOLUTION INTRAVENOUS at 19:04

## 2019-06-21 RX ADMIN — Medication 2 MILLIGRAM(S): at 03:55

## 2019-06-21 NOTE — PROGRESS NOTE ADULT - ASSESSMENT
HPI:  53 yo m with pmh of alcohol abuse and pud presents in ED c/o shakiness, bloody vomit and black "hard" stool the last few days. patient reports he has not drank in 2 days. No fever/chills, No photophobia/eye pain/changes in vision, No ear pain/sore throat/dysphagia, No chest pain/palpitations, no SOB/cough/wheeze/stridor, No abdominal pain, No D, no dysuria/frequency/discharge, No neck/back pain, no rash, no changes in neurological status/function. he was admitted two weeks ago for the same problem and had endoscopy that showed   ESOPHAGUS:  Mild narrowing in UES ( minimal tension necessary ), Superficial ulceration / esophagitis lower 1/2 of esophagus s/p biopsy    STOMACH: mild antral gastritis s/p biopsy    DUODENUM: WNL  and was discharged on a ppi and carfate but continued to drink and only stoppede two days ago (20 Jun 2019 12:26)  ------------------- As Above ------------------------------------  Patient is a poor historian. Patient states that he had been vomiting red / black x 2 days. A/W abdominal pain. He states that he stopped drinking for two days. Patient refuses to tell me how much he was drinking. Patient denies melena and hematochezia but told the ER staff that he had black stool. ( ER reports the patient had heme (-) stool.  The patient states that he was vomiting blood in the ER but the nurse states he is only spitting mucus.   Patient recently in [facility] VS with ETOH abuse and upper GI bleed. See EGD report above   See labs. / CT scan     Upper GI Bleed (?- unwitnessed HGB 15.4 )  - NPO / IV fluid / IV PPI / Carafate    [id number] - No signs of any active GI problems at present time . Supportive care for now

## 2019-06-21 NOTE — PROGRESS NOTE ADULT - SUBJECTIVE AND OBJECTIVE BOX
Patient is a 54y old  Male who presents with a chief complaint of vomiting blood (2019 11:32)      HPI:  53 yo m with pmh of alcohol abuse and pud presents in ED c/o shakiness, bloody vomit and black "hard" stool the last few days. patient reports he has not drank in 2 days. No fever/chills, No photophobia/eye pain/changes in vision, No ear pain/sore throat/dysphagia, No chest pain/palpitations, no SOB/cough/wheeze/stridor, No abdominal pain, No D, no dysuria/frequency/discharge, No neck/back pain, no rash, no changes in neurological status/function. he was admitted two weeks ago for the same problem and had endoscopy that showed   ESOPHAGUS:  Mild narrowing in UES ( minimal tension necessary ), Superficial ulceration / esophagitis lower 1/2 of esophagus s/p biopsy    STOMACH: mild antral gastritis s/p biopsy    DUODENUM: WNL  and was discharged on a ppi and carfate but continued to drink and only stoppede two days ago (2019 12:26)      INTERVAL HPI/OVERNIGHT EVENTS:  Chart reviewed. Now in CCU with withdrawal. The nurse denies melena, hematochezia, hematemesis, nausea, vomiting, abdominal pain, constipation, diarrhea, or change in bowel movements     MEDICATIONS  (STANDING):  chlorhexidine 4% Liquid 1 Application(s) Topical <User Schedule>  dexmedetomidine Infusion 0.2 MICROgram(s)/kG/Hr (3.6 mL/Hr) IV Continuous <Continuous>  heparin  Injectable 5000 Unit(s) SubCutaneous every 8 hours  LORazepam   Injectable   IV Push   LORazepam   Injectable 4 milliGRAM(s) IV Push every 4 hours  multivitamin/thiamine/folic acid in sodium chloride 0.9% 1000 milliLiter(s) (42 mL/Hr) IV Continuous <Continuous>  pantoprazole  Injectable 40 milliGRAM(s) IV Push every 12 hours  risperiDONE   Tablet 1 milliGRAM(s) Oral two times a day  sucralfate suspension 1 Gram(s) Oral four times a day    MEDICATIONS  (PRN):  HYDROmorphone  Injectable 0.5 milliGRAM(s) IV Push every 4 hours PRN Severe Pain (7 - 10)  LORazepam   Injectable 2 milliGRAM(s) IV Push every 2 hours PRN CIWA-Ar score increase by 2 points and a total score of 7 or less  PHENobarbital Injectable 130 milliGRAM(s) IV Push every 15 minutes PRN for CIWA > 12  PHENobarbital Injectable 260 milliGRAM(s) IV Push every 15 minutes PRN ciwa > 20      FAMILY HISTORY:  No pertinent family history in first degree relatives (Father, Mother)      Allergies    No Known Allergies    Intolerances        PMH/PSH:  EtOH dependence  Mixed hyperlipidemia  PUD (peptic ulcer disease)  Alcohol abuse  No significant past surgical history        REVIEW OF SYSTEMS:  CONSTITUTIONAL: No fever, weight loss,  RESPIRATORY: No cough, wheezing, chills or hemoptysis; No shortness of breath  CARDIOVASCULAR: No chest pain, palpitations, dizziness, or leg swelling  GASTROINTESTINAL: No abdominal or epigastric pain. No nausea, vomiting, or hematemesis; No diarrhea or constipation. No melena or hematochezia.      Vital Signs Last 24 Hrs  T(C): 36.7 (2019 15:30), Max: 37.7 (2019 11:11)  T(F): 98.1 (2019 15:30), Max: 99.9 (2019 11:11)  HR: 93 (2019 16:03) (50 - 126)  BP: 142/87 (2019 16:03) (111/66 - 142/87)  BP(mean): 100 (2019 16:03) (82 - 100)  RR: 23 (2019 16:03) (16 - 27)  SpO2: 100% (2019 16:03) (96% - 100%)    PHYSICAL EXAM:  GENERAL: NAD, well-groomed, well-developed  CHEST/LUNG: Clear to percussion bilaterally; No rales, rhonchi, wheezing, or rubs  HEART: Regular rate and rhythm; No murmurs, rubs, or gallops  ABDOMEN: Soft, Nontender, Nondistended; Bowel sounds present      LAB   @ 07:03  amylase --   lipase 157                           12.2   4.64  )-----------( 187      ( 2019 10:06 )             38.4       CBC:   @ 10:06  WBC 4.64   Hgb 12.2   Hct 38.4   Plts 187  MCV 87.7   @ 06:24  WBC 4.60   Hgb 11.4   Hct 35.0   Plts 161  MCV 87.3   @ 00:27  WBC 5.91   Hgb 12.3   Hct 38.6   Plts 201  MCV 88.5   @ 07:03  WBC 9.80   Hgb 15.4   Hct 46.8   Plts 270  MCV 83.7      Chemistry:   @ 10:06  Na+ 142  K+ 3.3  Cl- 108  CO2 23  BUN 7  Cr 1.05      @ 06:24  Na+ 140  K+ 3.5  Cl- 106  CO2 26  BUN 7  Cr 0.93      @ 00:27  Na+ 142  K+ 3.3  Cl- 106  CO2 27  BUN 8  Cr 0.87      @ 07:03  Na+ 140  K+ 3.5  Cl- 94  CO2 29  BUN 11  Cr 1.19         Glucose, Serum: 129 mg/dL ( @ 10:06)  Glucose, Serum: 103 mg/dL ( @ 06:24)  Glucose, Serum: 75 mg/dL ( @ 00:27)  Glucose, Serum: 202 mg/dL ( @ 07:03)      2019 10:06    142    |  108    |  7      ----------------------------<  129    3.3     |  23     |  1.05   2019 06:24    140    |  106    |  7      ----------------------------<  103    3.5     |  26     |  0.93   2019 00:27    142    |  106    |  8      ----------------------------<  75     3.3     |  27     |  0.87   2019 07:03    140    |  94     |  11     ----------------------------<  202    3.5     |  29     |  1.19     Ca    8.5        2019 10:06  Ca    8.2        2019 06:24  Ca    8.1        2019 00:27  Ca    10.0       2019 07:03  Phos  1.5       2019 10:06  Phos  1.8       2019 06:24  Phos  1.8       2019 00:27  Mg     1.9       2019 10:06  Mg     2.1       2019 06:24  Mg     2.0       2019 00:27  Mg     1.8       2019 10:42    TPro  7.2    /  Alb  3.7    /  TBili  1.3    /  DBili  .34    /  AST  34     /  ALT  33     /  AlkPhos  39     2019 10:06  TPro  7.1    /  Alb  3.4    /  TBili  1.1    /  DBili  .23    /  AST  29     /  ALT  34     /  AlkPhos  39     2019 00:27  TPro  9.2    /  Alb  4.6    /  TBili  1.1    /  DBili  x      /  AST  46     /  ALT  49     /  AlkPhos  55     2019 07:03      PT/INR - ( 2019 10:42 )   PT: 12.5 sec;   INR: 1.11 ratio         PTT - ( 2019 10:42 )  PTT:26.8 sec    Urinalysis Basic - ( 2019 11:17 )    Color: Yellow / Appearance: Clear / S.010 / pH: x  Gluc: x / Ketone: Moderate  / Bili: Negative / Urobili: Negative mg/dL   Blood: x / Protein: 30 mg/dL / Nitrite: Negative   Leuk Esterase: Negative / RBC: 0-2 /HPF / WBC x   Sq Epi: x / Non Sq Epi: Few / Bacteria: x        CAPILLARY BLOOD GLUCOSE              RADIOLOGY & ADDITIONAL TESTS:    Imaging Personally Reviewed:  [ ] YES  [ ] NO    Consultant(s) Notes Reviewed:  [ ] YES  [ ] NO    Care Discussed with Consultants/Other Providers [ ] YES  [ ] NO

## 2019-06-21 NOTE — CONSULT NOTE ADULT - SUBJECTIVE AND OBJECTIVE BOX
55 yo m with pmh of alcohol abuse and pud presents in ED c/o shakiness, bloody vomit and black "hard" stool the last few days. patient reports he has not drank in 2 days. No fever/chills, No photophobia/eye pain/changes in vision, No ear pain/sore throat/dysphagia, No chest pain/palpitations, no SOB/cough/wheeze/stridor, No abdominal pain, No D, no dysuria/frequency/discharge, No neck/back pain, no rash, no changes in neurological status/function. he was admitted two weeks ago for the same problem and had endoscopy that showed ESOPHAGUS:  Mild narrowing in UES ( minimal tension necessary ), Superficial ulceration / esophagitis lower 1/2 of esophagus s/p biopsy STOMACH: mild antral gastritis s/p biopsy DUODENUM: WNL  and was discharged on a PPI and Carafate but continued to drink and only stopped two days ago. Patient admitted to medicine service for with vomiting blood with elevated CIWA score.  This am patient with increased agitation, code gray x 2. Called to evaluate patient for CIWA > 20. Patient placed on Ativan protocol and given phenobarb IVP x2. Remains agitated and + hallucinations For Safety on 1: 1      Vital Signs Last 24 Hrs  T(C): 37.7 (21 Jun 2019 11:11), Max: 37.7 (21 Jun 2019 11:11)  T(F): 99.9 (21 Jun 2019 11:11), Max: 99.9 (21 Jun 2019 11:11)  HR: 50 (21 Jun 2019 14:17) (50 - 126)  BP: 132/76 (21 Jun 2019 14:17) (111/66 - 139/85)  BP(mean): 88 (21 Jun 2019 14:17) (82 - 97)  RR: 17 (21 Jun 2019 14:17) (16 - 27)  SpO2: 98% (21 Jun 2019 14:17) (96% - 100%)  I&O's Summary    21 Jun 2019 07:01  -  21 Jun 2019 15:02  --------------------------------------------------------  IN: 11.4 mL / OUT: 0 mL / NET: 11.4 mL        MEDICATIONS  (STANDING):  chlorhexidine 4% Liquid 1 Application(s) Topical <User Schedule>  dexmedetomidine Infusion 0.2 MICROgram(s)/kG/Hr (3.6 mL/Hr) IV Continuous <Continuous>  folic acid 1 milliGRAM(s) Oral daily  heparin  Injectable 5000 Unit(s) SubCutaneous every 8 hours  LORazepam   Injectable   IV Push   LORazepam   Injectable 4 milliGRAM(s) IV Push every 4 hours  multivitamin 1 Tablet(s) Oral daily  pantoprazole  Injectable 40 milliGRAM(s) IV Push every 12 hours  risperiDONE   Tablet 1 milliGRAM(s) Oral two times a day  sucralfate suspension 1 Gram(s) Oral four times a day  thiamine 100 milliGRAM(s) Oral daily    MEDICATIONS  (PRN):  HYDROmorphone  Injectable 0.5 milliGRAM(s) IV Push every 4 hours PRN Severe Pain (7 - 10)  LORazepam   Injectable 2 milliGRAM(s) IV Push every 2 hours PRN CIWA-Ar score increase by 2 points and a total score of 7 or less  PHENobarbital Injectable 130 milliGRAM(s) IV Push every 15 minutes PRN for CIWA > 12  PHENobarbital Injectable 260 milliGRAM(s) IV Push every 15 minutes PRN ciwa > 20      PHYSICAL EXAM:  General:	In no acute distress, very restless   Respiratory:	Lungs CTA b/l. No rales, rhonchi, retractions or wheezing. Effort even and unlabored.  CV:		RRR. Normal S1/S2. No murmurs, rubs, or gallop.   Abdomen:	Soft, non-distended. Bowel sounds present. No palpable hepatosplenomegaly.  Skin:		No rash.  Extremities:	Warm and well perfused. No gross extremity deformities.  Neurologic:	Awake, orieinted x1, no tremors + hallucinations.    LABS                        12.2   4.64  )-----------( 187      ( 21 Jun 2019 10:06 )             38.4     06-21    142  |  108  |  7   ----------------------------<  129<H>  3.3<L>   |  23  |  1.05    Ca    8.5      21 Jun 2019 10:06  Phos  1.5     06-21  Mg     1.9     06-21    TPro  7.2  /  Alb  3.7  /  TBili  1.3<H>  /  DBili  .34<H>  /  AST  34  /  ALT  33  /  AlkPhos  39<L>  06-21    PT/INR - ( 20 Jun 2019 10:42 )   PT: 12.5 sec;   INR: 1.11 ratio         PTT - ( 20 Jun 2019 10:42 )  PTT:26.8 sec

## 2019-06-21 NOTE — CONSULT NOTE ADULT - ATTENDING COMMENTS
Pt seen and examined with NP on medical floor 6/21 and upon transfer to ICU    54M PMH chronic alcohol abuse, multiple admissions for alcohol withdrawal complicated by DTs, hypoxic respiratory failure due to aspiration PNA, staph PNA, hx of intubation, GERD, PUD (gastritis) presents for abdominal pain, bloody vomiting reportedly with melanotic stool. Pt just admitted 6/6 for coffee ground emesis and discharged home with PPI and carafate s/p EGD showing gastritis and esophagitis. Last drink 2 days ago. Admitted to medical service for alcohol withdrawal. Course complicated by high CIWA >20, agitation. Transfer to ICU for DTs.    - pt completely disoriented, [place] is [place]   - appears to be hallucinating grabbing at things in the air  - restless and yelling  - cont 1:1 observation  - cont with ativan   - start precedex drip  - phenobarbital pushes for breakthrough agitation  - if CIWA remains high and agitation persists will place on standing phenobarbital  - MVI/thiamine/folate banana bag  - supplement hypophosphatemia, hypokalemia  - H/H stable, there have been no further reports of emesis  - cont carafate and protonix  - CT abd showing esophagitis and gastritis  - GI following pt    Critical Care Time: 35 min

## 2019-06-21 NOTE — CONSULT NOTE ADULT - ASSESSMENT
Patient is a 54 year old of male with pmhx of alcohol abuse, p/w vomiting admitted to medicine floor for gastritis, code gray with CIWA of 21 after 3x 260mg of phenobarbital transfer to critical care for severe agitation with alcohol withdrawal.   -transfer to critical care  -CIWA as per protocol  -constant observation  -precedex drip to RASS 0 to -1  -ativan 4 mg IVp q4 - and taper  -phenobarb 130mg every 15 min for CIWA >12  -phenobarb 260mg every 15min for CIWA >20  -thiamin/folic acid/ MV
HPI:  55 yo m with pmh of alcohol abuse and pud presents in ED c/o shakiness, bloody vomit and black "hard" stool the last few days. patient reports he has not drank in 2 days. No fever/chills, No photophobia/eye pain/changes in vision, No ear pain/sore throat/dysphagia, No chest pain/palpitations, no SOB/cough/wheeze/stridor, No abdominal pain, No D, no dysuria/frequency/discharge, No neck/back pain, no rash, no changes in neurological status/function. he was admitted two weeks ago for the same problem and had endoscopy that showed   ESOPHAGUS:  Mild narrowing in UES ( minimal tension necessary ), Superficial ulceration / esophagitis lower 1/2 of esophagus s/p biopsy    STOMACH: mild antral gastritis s/p biopsy    DUODENUM: WNL  and was discharged on a ppi and carfate but continued to drink and only stoppede two days ago (20 Jun 2019 12:26)  ------------------- As Above ------------------------------------  Patient is a poor historian. Patient states that he had been vomiting red / black x 2 days. A/W abdominal pain. He states that he stopped drinking for two days. Patient refuses to tell me how much he was drinking. Patient denies melena and hematochezia but told the ER staff that he had black stool. ( ER reports the patient had heme (-) stool.  The patient states that he was vomiting blood in the ER but the nurse states he is only spitting mucus.   Patient recently in [facility] with ETOH abuse and upper GI bleed. See EGD report above   See labs. / CT scan     Upper GI Bleed (?- unwitnessed HGB 15.4 )  - NPO / IV fluid / IV PPI / Carafate

## 2019-06-21 NOTE — CHART NOTE - NSCHARTNOTEFT_GEN_A_CORE
Called by RN for pt with CIWA of 17 attempting to elope.  Larry Delaney was called, pt returned to room, now with 1:1  Pt is found ambulating around his bed, packing clothing in a bag.  He perseverates that he wants to leave, is difficult to redirect.  +Unsteady gait, +tremulous, +confusion and agitation.  Pt on po ativan taper received 2mg IVP ativan approx 1 hour prior to this event for elevated CIWA  Will now tx rising CIWA score with ativan 4mg IVP x1.  Pt well known to ICU PA, suggests proceeding with ativan and re-eval in 30 to 60minutes.  If CIWA remains elevated rec'd phenobarb 130mg IVP followed by ICU eval for precedex if symptoms persist.

## 2019-06-21 NOTE — CHART NOTE - NSCHARTNOTEFT_GEN_A_CORE
Medicine Hospitalist PA    Was notified by RN pt with CIWA 19. 55 YOM admitted with ETOH abuse, well known to [facility], normally gets placed on Precedex drip. Pt was on ativan PO taper, given ativan 2mg IVP and 4mg IVP overnight. Given phenobarb 130mg IVP @7AM. Now taper changed to 4mg STAT. PRN Ativan 2mg IVP ordered for CIWA>2points.   STAT ICU consult. Medicine Hospitalist PA    Was notified by RN pt with CIWA 19. 54 YOM admitted with ETOH abuse, well known to [facility], normally gets placed on Precedex drip. Pt was on ativan PO taper, given ativan 2mg IVP and 4mg IVP overnight. Given phenobarb 130mg IVP @7AM. Now taper changed to 4mg STAT. PRN Ativan 2mg IVP ordered for CIWA>2points.   STAT ICU consult. Medicine Hospitalist PA    Was notified by RN pt with CIWA 19. 54 YOM admitted with ETOH abuse, well known to [facility], normally gets placed on Precedex drip. Pt was on ativan PO taper, given ativan 2mg IVP and 4mg IVP overnight. Given phenobarb 130mg IVP @7AM. Now taper changed to 4mg STAT. PRN Ativan 2mg IVP ordered for CIWA>2points.   STAT ICU consult.    Addendum: 07:55  D/w ICU NP, to see patient. But in meantime given another Phenobarb 130mg IVP. and monitor.

## 2019-06-22 LAB
ALBUMIN SERPL ELPH-MCNC: 3.4 G/DL — SIGNIFICANT CHANGE UP (ref 3.3–5)
ALP SERPL-CCNC: 41 U/L — SIGNIFICANT CHANGE UP (ref 40–120)
ALT FLD-CCNC: 37 U/L — SIGNIFICANT CHANGE UP (ref 12–78)
ANION GAP SERPL CALC-SCNC: 12 MMOL/L — SIGNIFICANT CHANGE UP (ref 5–17)
AST SERPL-CCNC: 40 U/L — HIGH (ref 15–37)
BILIRUB SERPL-MCNC: 1.2 MG/DL — SIGNIFICANT CHANGE UP (ref 0.2–1.2)
BUN SERPL-MCNC: 6 MG/DL — LOW (ref 7–23)
CALCIUM SERPL-MCNC: 8.8 MG/DL — SIGNIFICANT CHANGE UP (ref 8.5–10.1)
CHLORIDE SERPL-SCNC: 108 MMOL/L — SIGNIFICANT CHANGE UP (ref 96–108)
CO2 SERPL-SCNC: 23 MMOL/L — SIGNIFICANT CHANGE UP (ref 22–31)
CREAT SERPL-MCNC: 0.77 MG/DL — SIGNIFICANT CHANGE UP (ref 0.5–1.3)
GLUCOSE SERPL-MCNC: 82 MG/DL — SIGNIFICANT CHANGE UP (ref 70–99)
HCT VFR BLD CALC: 41.3 % — SIGNIFICANT CHANGE UP (ref 39–50)
HGB BLD-MCNC: 13.2 G/DL — SIGNIFICANT CHANGE UP (ref 13–17)
MAGNESIUM SERPL-MCNC: 2.3 MG/DL — SIGNIFICANT CHANGE UP (ref 1.6–2.6)
MCHC RBC-ENTMCNC: 27.9 PG — SIGNIFICANT CHANGE UP (ref 27–34)
MCHC RBC-ENTMCNC: 32 GM/DL — SIGNIFICANT CHANGE UP (ref 32–36)
MCV RBC AUTO: 87.3 FL — SIGNIFICANT CHANGE UP (ref 80–100)
NRBC # BLD: 0 /100 WBCS — SIGNIFICANT CHANGE UP (ref 0–0)
PHOSPHATE SERPL-MCNC: 2.2 MG/DL — LOW (ref 2.5–4.5)
PLATELET # BLD AUTO: 191 K/UL — SIGNIFICANT CHANGE UP (ref 150–400)
POTASSIUM SERPL-MCNC: 3.4 MMOL/L — LOW (ref 3.5–5.3)
POTASSIUM SERPL-SCNC: 3.4 MMOL/L — LOW (ref 3.5–5.3)
PROT SERPL-MCNC: 7.3 GM/DL — SIGNIFICANT CHANGE UP (ref 6–8.3)
RBC # BLD: 4.73 M/UL — SIGNIFICANT CHANGE UP (ref 4.2–5.8)
RBC # FLD: 14.8 % — HIGH (ref 10.3–14.5)
SODIUM SERPL-SCNC: 143 MMOL/L — SIGNIFICANT CHANGE UP (ref 135–145)
WBC # BLD: 4.42 K/UL — SIGNIFICANT CHANGE UP (ref 3.8–10.5)
WBC # FLD AUTO: 4.42 K/UL — SIGNIFICANT CHANGE UP (ref 3.8–10.5)

## 2019-06-22 RX ORDER — POTASSIUM PHOSPHATE, MONOBASIC POTASSIUM PHOSPHATE, DIBASIC 236; 224 MG/ML; MG/ML
15 INJECTION, SOLUTION INTRAVENOUS ONCE
Refills: 0 | Status: COMPLETED | OUTPATIENT
Start: 2019-06-22 | End: 2019-06-22

## 2019-06-22 RX ORDER — ACETAMINOPHEN 500 MG
650 TABLET ORAL EVERY 6 HOURS
Refills: 0 | Status: DISCONTINUED | OUTPATIENT
Start: 2019-06-22 | End: 2019-07-08

## 2019-06-22 RX ADMIN — Medication 650 MILLIGRAM(S): at 21:49

## 2019-06-22 RX ADMIN — Medication 4 MILLIGRAM(S): at 18:08

## 2019-06-22 RX ADMIN — PANTOPRAZOLE SODIUM 40 MILLIGRAM(S): 20 TABLET, DELAYED RELEASE ORAL at 06:06

## 2019-06-22 RX ADMIN — DEXMEDETOMIDINE HYDROCHLORIDE IN 0.9% SODIUM CHLORIDE 3.6 MICROGRAM(S)/KG/HR: 4 INJECTION INTRAVENOUS at 22:41

## 2019-06-22 RX ADMIN — DEXMEDETOMIDINE HYDROCHLORIDE IN 0.9% SODIUM CHLORIDE 3.6 MICROGRAM(S)/KG/HR: 4 INJECTION INTRAVENOUS at 02:01

## 2019-06-22 RX ADMIN — Medication 650 MILLIGRAM(S): at 22:30

## 2019-06-22 RX ADMIN — Medication 3 MILLIGRAM(S): at 06:06

## 2019-06-22 RX ADMIN — Medication 130 MILLIGRAM(S): at 00:21

## 2019-06-22 RX ADMIN — DEXMEDETOMIDINE HYDROCHLORIDE IN 0.9% SODIUM CHLORIDE 3.6 MICROGRAM(S)/KG/HR: 4 INJECTION INTRAVENOUS at 15:53

## 2019-06-22 RX ADMIN — SODIUM CHLORIDE 42 MILLILITER(S): 9 INJECTION, SOLUTION INTRAVENOUS at 15:53

## 2019-06-22 RX ADMIN — Medication 130 MILLIGRAM(S): at 19:31

## 2019-06-22 RX ADMIN — Medication 1 GRAM(S): at 12:04

## 2019-06-22 RX ADMIN — Medication 3 MILLIGRAM(S): at 10:06

## 2019-06-22 RX ADMIN — Medication 260 MILLIGRAM(S): at 13:48

## 2019-06-22 RX ADMIN — Medication 1 GRAM(S): at 18:09

## 2019-06-22 RX ADMIN — CHLORHEXIDINE GLUCONATE 1 APPLICATION(S): 213 SOLUTION TOPICAL at 06:07

## 2019-06-22 RX ADMIN — HEPARIN SODIUM 5000 UNIT(S): 5000 INJECTION INTRAVENOUS; SUBCUTANEOUS at 06:07

## 2019-06-22 RX ADMIN — Medication 130 MILLIGRAM(S): at 02:23

## 2019-06-22 RX ADMIN — RISPERIDONE 1 MILLIGRAM(S): 4 TABLET ORAL at 18:09

## 2019-06-22 RX ADMIN — PANTOPRAZOLE SODIUM 40 MILLIGRAM(S): 20 TABLET, DELAYED RELEASE ORAL at 18:09

## 2019-06-22 RX ADMIN — POTASSIUM PHOSPHATE, MONOBASIC POTASSIUM PHOSPHATE, DIBASIC 62.5 MILLIMOLE(S): 236; 224 INJECTION, SOLUTION INTRAVENOUS at 06:44

## 2019-06-22 RX ADMIN — Medication 4 MILLIGRAM(S): at 21:49

## 2019-06-22 RX ADMIN — HEPARIN SODIUM 5000 UNIT(S): 5000 INJECTION INTRAVENOUS; SUBCUTANEOUS at 21:49

## 2019-06-22 RX ADMIN — Medication 4 MILLIGRAM(S): at 01:57

## 2019-06-22 RX ADMIN — HEPARIN SODIUM 5000 UNIT(S): 5000 INJECTION INTRAVENOUS; SUBCUTANEOUS at 13:47

## 2019-06-22 RX ADMIN — Medication 130 MILLIGRAM(S): at 20:06

## 2019-06-22 NOTE — PROGRESS NOTE ADULT - ASSESSMENT
54M PMH chronic alcohol abuse, multiple admissions for alcohol withdrawal complicated by DTs, hypoxic respiratory failure due to aspiration PNA, staph PNA, hx of intubation, GERD, PUD (gastritis) here with alcohol withdrawal, DTs, upper GI bleed    1. NEURO  - cont CIWA monitoring   - wean off precedex as tolerates  - d/c ativan taper and placed on standing ativan in attempt to wean off precedex drip  - cont home risperidone  - daily banana bag (MVI/thiamine/folate)    2. GI  - no further coffee ground emesis, no further melena  - likely gastritis/esophagitis as demonstrated on recent EGD in setting of alcohol abuse  - cont protonix and carafate  - Hb stable 12, has not required pRBC transfusion    3. GEN  -supplement hypokalemia and hypophosphatemia    Critical Care Time: 32 min

## 2019-06-22 NOTE — PROGRESS NOTE ADULT - SUBJECTIVE AND OBJECTIVE BOX
HPI:  54M PMH chronic alcohol abuse, multiple admissions for alcohol withdrawal complicated by DTs, hypoxic respiratory failure due to aspiration PNA, staph PNA, hx of intubation, GERD, PUD (gastritis) presents for abdominal pain, bloody vomiting reportedly with melanotic stool. Pt just admitted 6/6 for coffee ground emesis and discharged home with PPI and carafate s/p EGD showing gastritis and esophagitis. Last drink 2 days ago. Admitted to medical service for alcohol withdrawal. Course complicated by high CIWA >20, agitation. Transferred to ICU for DTs.    24 hr events:  on precedex and ativan taper  did not require prn phenobarbital overnight for agitation  remains altered, hallucinating  no further emesis or melena    ## ROS:  [x] unable to obtain due to delirium      ## Labs:  Complete Blood Count (06.22.19 @ 04:17)    Nucleated RBC: 0 /100 WBCs    WBC Count: 4.42 K/uL    RBC Count: 4.73 M/uL    Hemoglobin: 13.2 g/dL    Hematocrit: 41.3 %    Mean Cell Volume: 87.3 fl    Mean Cell Hemoglobin: 27.9 pg    Mean Cell Hemoglobin Conc: 32.0 gm/dL    Red Cell Distrib Width: 14.8 %    Platelet Count - Automated: 191 K/uL    Comprehensive Metabolic Panel (06.22.19 @ 04:17)    Sodium, Serum: 143 mmol/L    Potassium, Serum: 3.4 mmol/L    Chloride, Serum: 108 mmol/L    Carbon Dioxide, Serum: 23 mmol/L    Anion Gap, Serum: 12 mmol/L    Blood Urea Nitrogen, Serum: 6 mg/dL    Creatinine, Serum: 0.77 mg/dL    Glucose, Serum: 82 mg/dL    Calcium, Total Serum: 8.8 mg/dL    Protein Total, Serum: 7.3 gm/dL    Albumin, Serum: 3.4 g/dL    Bilirubin Total, Serum: 1.2 mg/dL    Alkaline Phosphatase, Serum: 41 U/L    Aspartate Aminotransferase (AST/SGOT): 40 U/L    Alanine Aminotransferase (ALT/SGPT): 37 U/L      ## Imaging:  no new imaging      ## Medications:  MEDICATIONS  (STANDING):  chlorhexidine 4% Liquid 1 Application(s) Topical <User Schedule>  dexmedetomidine Infusion 0.2 MICROgram(s)/kG/Hr (3.6 mL/Hr) IV Continuous <Continuous>  heparin  Injectable 5000 Unit(s) SubCutaneous every 8 hours  LORazepam   Injectable 4 milliGRAM(s) IV Push every 4 hours  multivitamin/thiamine/folic acid in sodium chloride 0.9% 1000 milliLiter(s) (42 mL/Hr) IV Continuous <Continuous>  pantoprazole  Injectable 40 milliGRAM(s) IV Push every 12 hours  risperiDONE   Tablet 1 milliGRAM(s) Oral two times a day  sucralfate suspension 1 Gram(s) Oral four times a day    MEDICATIONS  (PRN):  acetaminophen   Tablet .. 650 milliGRAM(s) Oral every 6 hours PRN Temp greater or equal to 38C (100.4F), Moderate Pain (4 - 6)  HYDROmorphone  Injectable 0.5 milliGRAM(s) IV Push every 4 hours PRN Severe Pain (7 - 10)  PHENobarbital Injectable 130 milliGRAM(s) IV Push every 15 minutes PRN for CIWA > 12  PHENobarbital Injectable 260 milliGRAM(s) IV Push every 15 minutes PRN ciwa > 20        ## Vitals:  T(F): 98.8  HR: 87  BP: 134/85  RR: 20  pO2: 97%          ## P/E:  Gen: lying in bed with constant observation at bedside, at times restless, grabbing things in the air  HEENT: PERRL, EOMI  Resp: CTA B/L   CVS: RRR  Abd: soft NT/ND +BS  Ext: no c/c/e  Neuro: altered, disoriented, follows commands, moving all extremities, oriented to person only    CENTRAL LINE: [ ] YES [x] NO  LOCATION:   DATE INSERTED:  REMOVE: [ ] YES [ ] NO      CHAN: [ ] YES [x] NO    DATE INSERTED:  REMOVE:  [ ] YES [ ] NO      A-LINE:  [ ] YES [x] NO  LOCATION:   DATE INSERTED:  REMOVE:  [ ] YES [ ] NO  EXPLAIN:    GLOBAL ISSUE/BEST PRACTICE:  Analgesia: n/a  Sedation: precedex, ativan  Stress ulcer prophylaxis: protonix, carafate  VTE prophylaxis: heparin sc  Oral Care: n/a  Glycemic control: n/a  Nutrition: po diet    CODE STATUS: [x] full code  [ ] DNR  [ ] DNI  [ ] MOLST  Goals of care discussion: [ ] yes

## 2019-06-23 LAB
ALBUMIN SERPL ELPH-MCNC: 3.2 G/DL — LOW (ref 3.3–5)
ALP SERPL-CCNC: 41 U/L — SIGNIFICANT CHANGE UP (ref 40–120)
ALT FLD-CCNC: 30 U/L — SIGNIFICANT CHANGE UP (ref 12–78)
ANION GAP SERPL CALC-SCNC: 11 MMOL/L — SIGNIFICANT CHANGE UP (ref 5–17)
ANION GAP SERPL CALC-SCNC: 9 MMOL/L — SIGNIFICANT CHANGE UP (ref 5–17)
AST SERPL-CCNC: 22 U/L — SIGNIFICANT CHANGE UP (ref 15–37)
BILIRUB SERPL-MCNC: 1.1 MG/DL — SIGNIFICANT CHANGE UP (ref 0.2–1.2)
BUN SERPL-MCNC: 11 MG/DL — SIGNIFICANT CHANGE UP (ref 7–23)
BUN SERPL-MCNC: 9 MG/DL — SIGNIFICANT CHANGE UP (ref 7–23)
CALCIUM SERPL-MCNC: 8.4 MG/DL — LOW (ref 8.5–10.1)
CALCIUM SERPL-MCNC: 8.5 MG/DL — SIGNIFICANT CHANGE UP (ref 8.5–10.1)
CHLORIDE SERPL-SCNC: 107 MMOL/L — SIGNIFICANT CHANGE UP (ref 96–108)
CHLORIDE SERPL-SCNC: 110 MMOL/L — HIGH (ref 96–108)
CO2 SERPL-SCNC: 21 MMOL/L — LOW (ref 22–31)
CO2 SERPL-SCNC: 22 MMOL/L — SIGNIFICANT CHANGE UP (ref 22–31)
CREAT SERPL-MCNC: 0.85 MG/DL — SIGNIFICANT CHANGE UP (ref 0.5–1.3)
CREAT SERPL-MCNC: 1.09 MG/DL — SIGNIFICANT CHANGE UP (ref 0.5–1.3)
GLUCOSE SERPL-MCNC: 153 MG/DL — HIGH (ref 70–99)
GLUCOSE SERPL-MCNC: 76 MG/DL — SIGNIFICANT CHANGE UP (ref 70–99)
HCT VFR BLD CALC: 40.4 % — SIGNIFICANT CHANGE UP (ref 39–50)
HGB BLD-MCNC: 13 G/DL — SIGNIFICANT CHANGE UP (ref 13–17)
MAGNESIUM SERPL-MCNC: 2.3 MG/DL — SIGNIFICANT CHANGE UP (ref 1.6–2.6)
MCHC RBC-ENTMCNC: 27.8 PG — SIGNIFICANT CHANGE UP (ref 27–34)
MCHC RBC-ENTMCNC: 32.2 GM/DL — SIGNIFICANT CHANGE UP (ref 32–36)
MCV RBC AUTO: 86.3 FL — SIGNIFICANT CHANGE UP (ref 80–100)
NRBC # BLD: 0 /100 WBCS — SIGNIFICANT CHANGE UP (ref 0–0)
PHOSPHATE SERPL-MCNC: 3.6 MG/DL — SIGNIFICANT CHANGE UP (ref 2.5–4.5)
PLATELET # BLD AUTO: 214 K/UL — SIGNIFICANT CHANGE UP (ref 150–400)
POTASSIUM SERPL-MCNC: 2.9 MMOL/L — CRITICAL LOW (ref 3.5–5.3)
POTASSIUM SERPL-MCNC: 4.7 MMOL/L — SIGNIFICANT CHANGE UP (ref 3.5–5.3)
POTASSIUM SERPL-SCNC: 2.9 MMOL/L — CRITICAL LOW (ref 3.5–5.3)
POTASSIUM SERPL-SCNC: 4.7 MMOL/L — SIGNIFICANT CHANGE UP (ref 3.5–5.3)
PROT SERPL-MCNC: 7 GM/DL — SIGNIFICANT CHANGE UP (ref 6–8.3)
RBC # BLD: 4.68 M/UL — SIGNIFICANT CHANGE UP (ref 4.2–5.8)
RBC # FLD: 15 % — HIGH (ref 10.3–14.5)
SODIUM SERPL-SCNC: 140 MMOL/L — SIGNIFICANT CHANGE UP (ref 135–145)
SODIUM SERPL-SCNC: 140 MMOL/L — SIGNIFICANT CHANGE UP (ref 135–145)
WBC # BLD: 6.05 K/UL — SIGNIFICANT CHANGE UP (ref 3.8–10.5)
WBC # FLD AUTO: 6.05 K/UL — SIGNIFICANT CHANGE UP (ref 3.8–10.5)

## 2019-06-23 RX ORDER — POTASSIUM CHLORIDE 20 MEQ
10 PACKET (EA) ORAL ONCE
Refills: 0 | Status: COMPLETED | OUTPATIENT
Start: 2019-06-23 | End: 2019-06-23

## 2019-06-23 RX ORDER — POTASSIUM CHLORIDE 20 MEQ
10 PACKET (EA) ORAL
Refills: 0 | Status: DISCONTINUED | OUTPATIENT
Start: 2019-06-23 | End: 2019-06-23

## 2019-06-23 RX ORDER — POTASSIUM CHLORIDE 20 MEQ
40 PACKET (EA) ORAL ONCE
Refills: 0 | Status: COMPLETED | OUTPATIENT
Start: 2019-06-23 | End: 2019-06-23

## 2019-06-23 RX ORDER — PHENOBARBITAL 60 MG
130 TABLET ORAL EVERY 6 HOURS
Refills: 0 | Status: DISCONTINUED | OUTPATIENT
Start: 2019-06-23 | End: 2019-06-24

## 2019-06-23 RX ADMIN — HEPARIN SODIUM 5000 UNIT(S): 5000 INJECTION INTRAVENOUS; SUBCUTANEOUS at 15:04

## 2019-06-23 RX ADMIN — PANTOPRAZOLE SODIUM 40 MILLIGRAM(S): 20 TABLET, DELAYED RELEASE ORAL at 18:10

## 2019-06-23 RX ADMIN — RISPERIDONE 1 MILLIGRAM(S): 4 TABLET ORAL at 05:59

## 2019-06-23 RX ADMIN — Medication 130 MILLIGRAM(S): at 18:11

## 2019-06-23 RX ADMIN — RISPERIDONE 1 MILLIGRAM(S): 4 TABLET ORAL at 18:11

## 2019-06-23 RX ADMIN — Medication 130 MILLIGRAM(S): at 16:31

## 2019-06-23 RX ADMIN — PANTOPRAZOLE SODIUM 40 MILLIGRAM(S): 20 TABLET, DELAYED RELEASE ORAL at 05:45

## 2019-06-23 RX ADMIN — Medication 130 MILLIGRAM(S): at 09:15

## 2019-06-23 RX ADMIN — Medication 130 MILLIGRAM(S): at 23:52

## 2019-06-23 RX ADMIN — Medication 40 MILLIEQUIVALENT(S): at 06:24

## 2019-06-23 RX ADMIN — Medication 130 MILLIGRAM(S): at 19:32

## 2019-06-23 RX ADMIN — Medication 1 GRAM(S): at 12:54

## 2019-06-23 RX ADMIN — Medication 130 MILLIGRAM(S): at 23:43

## 2019-06-23 RX ADMIN — Medication 1 GRAM(S): at 05:59

## 2019-06-23 RX ADMIN — Medication 130 MILLIGRAM(S): at 17:05

## 2019-06-23 RX ADMIN — HEPARIN SODIUM 5000 UNIT(S): 5000 INJECTION INTRAVENOUS; SUBCUTANEOUS at 22:23

## 2019-06-23 RX ADMIN — Medication 130 MILLIGRAM(S): at 12:54

## 2019-06-23 RX ADMIN — Medication 100 MILLIEQUIVALENT(S): at 05:45

## 2019-06-23 RX ADMIN — Medication 1 GRAM(S): at 18:11

## 2019-06-23 RX ADMIN — Medication 4 MILLIGRAM(S): at 05:59

## 2019-06-23 RX ADMIN — SODIUM CHLORIDE 42 MILLILITER(S): 9 INJECTION, SOLUTION INTRAVENOUS at 15:05

## 2019-06-23 RX ADMIN — CHLORHEXIDINE GLUCONATE 1 APPLICATION(S): 213 SOLUTION TOPICAL at 06:09

## 2019-06-23 RX ADMIN — Medication 4 MILLIGRAM(S): at 10:03

## 2019-06-23 RX ADMIN — Medication 100 MILLIEQUIVALENT(S): at 06:44

## 2019-06-23 RX ADMIN — HEPARIN SODIUM 5000 UNIT(S): 5000 INJECTION INTRAVENOUS; SUBCUTANEOUS at 05:45

## 2019-06-23 NOTE — DIETITIAN INITIAL EVALUATION ADULT. - NS FNS REASON FOR WEIGHT CHANG
as per previous adm RD evaluation, pt was 80.9 kg/178.3 LBS on 05 /18/18, pt c wt. loss of 5.2 kg as per drug dosing wt. of 75.7 kg(06/21/19)/other (specify)

## 2019-06-23 NOTE — DIETITIAN INITIAL EVALUATION ADULT. - OTHER INFO
Pt seen due to CCU adm.  Pt c depressed oral intake @ present as per Nursing Assistant, taking <50% of meals.

## 2019-06-23 NOTE — DIETITIAN INITIAL EVALUATION ADULT. - PHYSICAL APPEARANCE
overweight/other (specify)/BMI=26.1(06/21/19), 1+ generalized edema noted, 2+ left hand noted, Nutrition focused physical exam conducted; Subcutaneous fat Exam;  [ WNL  ]  Orbital fat pads region,  [  WNL ]Buccal fat region,  [ WNL  ]triceps region, [ WNL  ]ribs region.  Muscle Exam; [ WNL  ]temples region, [ WNL  ]clavicle region, [ Mild  ]shoulder region, [ Unable  ]Scapula region, [ Unable , edema noted on both hands ]Interosseous region, [ Mild  ]thigh region, [ WNL  ]Calf region

## 2019-06-23 NOTE — PROVIDER CONTACT NOTE (CRITICAL VALUE NOTIFICATION) - BACKGROUND
Pt admitted for Alcohol Withdrawal. Hx of PUD and Alcohol abuse. Pt on IV fluids for electrolyte supplementation.

## 2019-06-23 NOTE — DIETITIAN INITIAL EVALUATION ADULT. - PERTINENT LABORATORY DATA
06-23 Na140 mmol/L Glu 153 mg/dL<H> K+ 4.7 mmol/L Cr  1.09 mg/dL BUN 11 mg/dL 06-23 Phos 3.6 mg/dL 06-23 Alb 3.2 g/dL<L>06-23 ALT 30 U/L AST 22 U/L Alkaline Phosphatase 41 U/L

## 2019-06-23 NOTE — PROGRESS NOTE ADULT - ASSESSMENT
54M PMH chronic alcohol abuse, multiple admissions for alcohol withdrawal complicated by DTs, hypoxic respiratory failure due to aspiration PNA, staph PNA, hx of intubation, GERD, PUD (gastritis) here with alcohol withdrawal, DTs, upper GI bleed    1. NEURO  - cont CIWA monitoring (CIWA range throughout the day 5-16)  - weaned off precedex  - will change to standing phenobarbital with prn breakthroughs  - cont home risperidone  - daily banana bag (MVI/thiamine/folate)    2. GI  - no further coffee ground emesis, no further melena  - likely gastritis/esophagitis as demonstrated on recent EGD in setting of alcohol abuse  - cont protonix and carafate  - Hb stable 12-13, has not required pRBC transfusion    3. GEN  - potassium supplemented    Critical Care Time: 32 min

## 2019-06-23 NOTE — DIETITIAN INITIAL EVALUATION ADULT. - ENERGY NEEDS
Height (cm): 170.18 (06-20)  Weight (kg): 75.7 (06-21)  BMI (kg/m2): 26.1 (06-21)  IBW: 67. 1kg       % IBW:  112%           UBW:  80.9 kg            %UBW: 93%(as per previous adm records), wt. loss 0.9 kg since adm noted, ? dry wt., c edema

## 2019-06-23 NOTE — PROGRESS NOTE ADULT - SUBJECTIVE AND OBJECTIVE BOX
HPI:  54M PMH chronic alcohol abuse, multiple admissions for alcohol withdrawal complicated by DTs, hypoxic respiratory failure due to aspiration PNA, staph PNA, hx of intubation, GERD, PUD (gastritis) presents for abdominal pain, bloody vomiting reportedly with melanotic stool. Pt just admitted 6/6 for coffee ground emesis and discharged home with PPI and carafate s/p EGD showing gastritis and esophagitis. Last drink 2 days ago. Admitted to medical service for alcohol withdrawal. Course complicated by high CIWA >20, agitation. Transferred to ICU for DTs.    24 hr events:  weaned off precedex   on standing ativan however still with periods of agitation requiring prn phenobarbital  remains altered and confused  no further emesis   + bowel movement with brown stool, no melena    ## ROS:  [x] unable to obtain due to delirium      ## Labs:  CBC:                        13.0   6.05  )-----------( 214      ( 23 Jun 2019 04:09 )             40.4     Chem:  06-23    140  |  110<H>  |  11  ----------------------------<  153<H>  4.7   |  21<L>  |  1.09    Ca    8.4<L>      23 Jun 2019 08:54  Phos  3.6     06-23  Mg     2.3     06-23    TPro  7.0  /  Alb  3.2<L>  /  TBili  1.1  /  DBili  x   /  AST  22  /  ALT  30  /  AlkPhos  41  06-23      ## Imaging:  no new imaging      ## Medications:  heparin  Injectable 5000 Unit(s) SubCutaneous every 8 hours    pantoprazole  Injectable 40 milliGRAM(s) IV Push every 12 hours  sucralfate suspension 1 Gram(s) Oral four times a day    acetaminophen   Tablet .. 650 milliGRAM(s) Oral every 6 hours PRN  dexmedetomidine Infusion 0.2 MICROgram(s)/kG/Hr IV Continuous <Continuous>    risperiDONE   Tablet 1 milliGRAM(s) Oral two times a day      ## Vitals:  T(C): 37.8 (06-23-19 @ 23:30), Max: 37.9 (06-23-19 @ 11:25)  HR: 85 (06-24-19 @ 03:00) (67 - 144)  BP: 143/93 (06-24-19 @ 03:00) (89/60 - 151/109)  BP(mean): 104 (06-24-19 @ 03:00) (67 - 119)  RR: 32 (06-24-19 @ 03:00) (18 - 36)  SpO2: 98% (06-24-19 @ 03:00) (74% - 100%)        06-22 @ 07:01  -  06-23 @ 07:00  --------------------------------------------------------  IN: 2030 mL / OUT: 1350 mL / NET: 680 mL              ## P/E:  Gen: lying in bed with constant observation at bedside, at times restless  HEENT: PERRL, EOMI  Resp: CTA B/L   CVS: RRR  Abd: soft NT/ND +BS  Ext: no c/c/e  Neuro: altered, disoriented, follows commands, moving all extremities, oriented to person only    CENTRAL LINE: [ ] YES [x] NO  LOCATION:   DATE INSERTED:  REMOVE: [ ] YES [ ] NO      CHAN: [ ] YES [x] NO    DATE INSERTED:  REMOVE:  [ ] YES [ ] NO      A-LINE:  [ ] YES [x] NO  LOCATION:   DATE INSERTED:  REMOVE:  [ ] YES [ ] NO  EXPLAIN:    GLOBAL ISSUE/BEST PRACTICE:  Analgesia: n/a  Sedation: phenobarbital  HOB elevation: yes  Stress ulcer prophylaxis: protonix, carafate  VTE prophylaxis: heparin sc  Oral Care: n/a  Glycemic control: n/a  Nutrition: po diet    CODE STATUS: [x] full code  [ ] DNR  [ ] DNI  [ ] [facility]  Goals of care discussion: [ ] yes

## 2019-06-23 NOTE — PROVIDER CONTACT NOTE (EICU) - ACTION/TREATMENT ORDERED:
10mEq IV potassium ordered stat, serum phos add on to chemistry stat to assess if pt needs potassium phos as has been hypophosphatemic.  f/u- serum phos 3.6, an additional 10mEq IV potassium x3 ordered as repletion with repeat bmp at 1100hr

## 2019-06-24 DIAGNOSIS — K92.2 GASTROINTESTINAL HEMORRHAGE, UNSPECIFIED: ICD-10-CM

## 2019-06-24 DIAGNOSIS — R11.2 NAUSEA WITH VOMITING, UNSPECIFIED: ICD-10-CM

## 2019-06-24 LAB
ALBUMIN SERPL ELPH-MCNC: 3.2 G/DL — LOW (ref 3.3–5)
ALP SERPL-CCNC: 40 U/L — SIGNIFICANT CHANGE UP (ref 40–120)
ALT FLD-CCNC: 25 U/L — SIGNIFICANT CHANGE UP (ref 12–78)
ANION GAP SERPL CALC-SCNC: 10 MMOL/L — SIGNIFICANT CHANGE UP (ref 5–17)
AST SERPL-CCNC: 17 U/L — SIGNIFICANT CHANGE UP (ref 15–37)
BASOPHILS # BLD AUTO: 0.03 K/UL — SIGNIFICANT CHANGE UP (ref 0–0.2)
BASOPHILS NFR BLD AUTO: 0.5 % — SIGNIFICANT CHANGE UP (ref 0–2)
BILIRUB SERPL-MCNC: 0.7 MG/DL — SIGNIFICANT CHANGE UP (ref 0.2–1.2)
BUN SERPL-MCNC: 7 MG/DL — SIGNIFICANT CHANGE UP (ref 7–23)
CALCIUM SERPL-MCNC: 8.7 MG/DL — SIGNIFICANT CHANGE UP (ref 8.5–10.1)
CHLORIDE SERPL-SCNC: 110 MMOL/L — HIGH (ref 96–108)
CO2 SERPL-SCNC: 20 MMOL/L — LOW (ref 22–31)
CREAT SERPL-MCNC: 0.79 MG/DL — SIGNIFICANT CHANGE UP (ref 0.5–1.3)
EOSINOPHIL # BLD AUTO: 0.07 K/UL — SIGNIFICANT CHANGE UP (ref 0–0.5)
EOSINOPHIL NFR BLD AUTO: 1.2 % — SIGNIFICANT CHANGE UP (ref 0–6)
GLUCOSE SERPL-MCNC: 76 MG/DL — SIGNIFICANT CHANGE UP (ref 70–99)
HCT VFR BLD CALC: 38.4 % — LOW (ref 39–50)
HGB BLD-MCNC: 12.6 G/DL — LOW (ref 13–17)
IMM GRANULOCYTES NFR BLD AUTO: 0.3 % — SIGNIFICANT CHANGE UP (ref 0–1.5)
LYMPHOCYTES # BLD AUTO: 1.15 K/UL — SIGNIFICANT CHANGE UP (ref 1–3.3)
LYMPHOCYTES # BLD AUTO: 19.4 % — SIGNIFICANT CHANGE UP (ref 13–44)
MAGNESIUM SERPL-MCNC: 2.1 MG/DL — SIGNIFICANT CHANGE UP (ref 1.6–2.6)
MCHC RBC-ENTMCNC: 28.3 PG — SIGNIFICANT CHANGE UP (ref 27–34)
MCHC RBC-ENTMCNC: 32.8 GM/DL — SIGNIFICANT CHANGE UP (ref 32–36)
MCV RBC AUTO: 86.3 FL — SIGNIFICANT CHANGE UP (ref 80–100)
MONOCYTES # BLD AUTO: 0.5 K/UL — SIGNIFICANT CHANGE UP (ref 0–0.9)
MONOCYTES NFR BLD AUTO: 8.4 % — SIGNIFICANT CHANGE UP (ref 2–14)
NEUTROPHILS # BLD AUTO: 4.16 K/UL — SIGNIFICANT CHANGE UP (ref 1.8–7.4)
NEUTROPHILS NFR BLD AUTO: 70.2 % — SIGNIFICANT CHANGE UP (ref 43–77)
NRBC # BLD: 0 /100 WBCS — SIGNIFICANT CHANGE UP (ref 0–0)
PHOSPHATE SERPL-MCNC: 2.8 MG/DL — SIGNIFICANT CHANGE UP (ref 2.5–4.5)
PLATELET # BLD AUTO: 209 K/UL — SIGNIFICANT CHANGE UP (ref 150–400)
POTASSIUM SERPL-MCNC: 3.1 MMOL/L — LOW (ref 3.5–5.3)
POTASSIUM SERPL-SCNC: 3.1 MMOL/L — LOW (ref 3.5–5.3)
PROT SERPL-MCNC: 7.3 GM/DL — SIGNIFICANT CHANGE UP (ref 6–8.3)
RBC # BLD: 4.45 M/UL — SIGNIFICANT CHANGE UP (ref 4.2–5.8)
RBC # FLD: 15.1 % — HIGH (ref 10.3–14.5)
SODIUM SERPL-SCNC: 140 MMOL/L — SIGNIFICANT CHANGE UP (ref 135–145)
WBC # BLD: 5.93 K/UL — SIGNIFICANT CHANGE UP (ref 3.8–10.5)
WBC # FLD AUTO: 5.93 K/UL — SIGNIFICANT CHANGE UP (ref 3.8–10.5)

## 2019-06-24 PROCEDURE — 99233 SBSQ HOSP IP/OBS HIGH 50: CPT

## 2019-06-24 PROCEDURE — 93010 ELECTROCARDIOGRAM REPORT: CPT

## 2019-06-24 RX ORDER — PHENOBARBITAL 60 MG
260 TABLET ORAL EVERY 6 HOURS
Refills: 0 | Status: DISCONTINUED | OUTPATIENT
Start: 2019-06-24 | End: 2019-06-25

## 2019-06-24 RX ORDER — PHENOBARBITAL 60 MG
130 TABLET ORAL ONCE
Refills: 0 | Status: DISCONTINUED | OUTPATIENT
Start: 2019-06-24 | End: 2019-06-26

## 2019-06-24 RX ORDER — POTASSIUM CHLORIDE 20 MEQ
10 PACKET (EA) ORAL
Refills: 0 | Status: COMPLETED | OUTPATIENT
Start: 2019-06-24 | End: 2019-06-24

## 2019-06-24 RX ORDER — PHENOBARBITAL 60 MG
130 TABLET ORAL ONCE
Refills: 0 | Status: DISCONTINUED | OUTPATIENT
Start: 2019-06-24 | End: 2019-06-24

## 2019-06-24 RX ADMIN — CHLORHEXIDINE GLUCONATE 1 APPLICATION(S): 213 SOLUTION TOPICAL at 06:06

## 2019-06-24 RX ADMIN — Medication 260 MILLIGRAM(S): at 12:07

## 2019-06-24 RX ADMIN — Medication 100 MILLIEQUIVALENT(S): at 08:00

## 2019-06-24 RX ADMIN — HEPARIN SODIUM 5000 UNIT(S): 5000 INJECTION INTRAVENOUS; SUBCUTANEOUS at 22:09

## 2019-06-24 RX ADMIN — Medication 130 MILLIGRAM(S): at 02:57

## 2019-06-24 RX ADMIN — HEPARIN SODIUM 5000 UNIT(S): 5000 INJECTION INTRAVENOUS; SUBCUTANEOUS at 06:06

## 2019-06-24 RX ADMIN — PANTOPRAZOLE SODIUM 40 MILLIGRAM(S): 20 TABLET, DELAYED RELEASE ORAL at 06:05

## 2019-06-24 RX ADMIN — PANTOPRAZOLE SODIUM 40 MILLIGRAM(S): 20 TABLET, DELAYED RELEASE ORAL at 17:42

## 2019-06-24 RX ADMIN — HEPARIN SODIUM 5000 UNIT(S): 5000 INJECTION INTRAVENOUS; SUBCUTANEOUS at 14:48

## 2019-06-24 RX ADMIN — Medication 100 MILLIEQUIVALENT(S): at 06:05

## 2019-06-24 RX ADMIN — Medication 100 MILLIEQUIVALENT(S): at 09:22

## 2019-06-24 RX ADMIN — Medication 130 MILLIGRAM(S): at 06:06

## 2019-06-24 RX ADMIN — Medication 1 GRAM(S): at 12:07

## 2019-06-24 NOTE — PROGRESS NOTE ADULT - SUBJECTIVE AND OBJECTIVE BOX
HPI:  55 yo m with pmh of alcohol abuse and pud presents in ED c/o shakiness, bloody vomit and black "hard" stool the last few days. patient reports he has not drank in 2 days. No fever/chills, No photophobia/eye pain/changes in vision, No ear pain/sore throat/dysphagia, No chest pain/palpitations, no SOB/cough/wheeze/stridor, No abdominal pain, No D, no dysuria/frequency/discharge, No neck/back pain, no rash, no changes in neurological status/function. he was admitted two weeks ago for the same problem and had endoscopy that showed   ESOPHAGUS:  Mild narrowing in UES ( minimal tension necessary ), Superficial ulceration / esophagitis lower 1/2 of esophagus s/p biopsy    STOMACH: mild antral gastritis s/p biopsy    DUODENUM: WNL  and was discharged on a ppi and carfate but continued to drink and only stoppede two days ago (20 Jun 2019 12:26)      24 hr events: No acute events. Off precedex. Remains confused, unable to provide history. Required multiple doses of PRN phenobarb.    ## ROS:  [x ] unable to obtain    ## Vitals  ICU Vital Signs Last 24 Hrs  T(C): 37.9 (24 Jun 2019 07:15), Max: 37.9 (23 Jun 2019 11:25)  T(F): 100.3 (24 Jun 2019 07:15), Max: 100.3 (24 Jun 2019 07:15)  HR: 104 (24 Jun 2019 07:15) (85 - 144)  BP: 146/93 (24 Jun 2019 07:00) (110/78 - 154/116)  BP(mean): 106 (24 Jun 2019 07:00) (84 - 124)  ABP: --  ABP(mean): --  RR: 25 (24 Jun 2019 07:15) (19 - 36)  SpO2: 98% (24 Jun 2019 07:15) (74% - 100%)      ## Physical Exam:  Gen: Adult male sitting in bed, confused, not following commands  HEENT: NC/AT sclerae anicteric  Resp: No increased WOB, CTAB  CV: S1, S2  Abd: Soft, + BS  Ext: No edema  Neuro: Awake, confused, not following commands, moves all extremities    ## Vent Data      ## Labs:  Chem:  06-24    140  |  110<H>  |  7   ----------------------------<  76  3.1<L>   |  20<L>  |  0.79    Ca    8.7      24 Jun 2019 04:20  Phos  2.8     06-24  Mg     2.1     06-24    TPro  7.3  /  Alb  3.2<L>  /  TBili  0.7  /  DBili  x   /  AST  17  /  ALT  25  /  AlkPhos  40  06-24    LIVER FUNCTIONS - ( 24 Jun 2019 04:20 )  Alb: 3.2 g/dL / Pro: 7.3 gm/dL / ALK PHOS: 40 U/L / ALT: 25 U/L / AST: 17 U/L / GGT: x           CBC:                        12.6   5.93  )-----------( 209      ( 24 Jun 2019 04:20 )             38.4     Coags:          ## Cardiac        ## Blood Gas      #I/Os  I&O's Detail    23 Jun 2019 07:01  -  24 Jun 2019 07:00  --------------------------------------------------------  IN:    multivitamin/thiamine/folic acid in sodium chloride 0.9%: 1008 mL    Oral Fluid: 390 mL    Solution: 100 mL  Total IN: 1498 mL    OUT:    Incontinent per Condom Catheter: 800 mL    Voided: 500 mL  Total OUT: 1300 mL    Total NET: 198 mL          ## Imaging:    ## Medications:  MEDICATIONS  (STANDING):  chlorhexidine 4% Liquid 1 Application(s) Topical <User Schedule>  dexmedetomidine Infusion 0.2 MICROgram(s)/kG/Hr (3.6 mL/Hr) IV Continuous <Continuous>  heparin  Injectable 5000 Unit(s) SubCutaneous every 8 hours  multivitamin/thiamine/folic acid in sodium chloride 0.9% 1000 milliLiter(s) (42 mL/Hr) IV Continuous <Continuous>  pantoprazole  Injectable 40 milliGRAM(s) IV Push every 12 hours  PHENobarbital Injectable 130 milliGRAM(s) IV Push every 6 hours  potassium chloride  10 mEq/100 mL IVPB 10 milliEquivalent(s) IV Intermittent every 1 hour  risperiDONE   Tablet 1 milliGRAM(s) Oral two times a day  sucralfate suspension 1 Gram(s) Oral four times a day    MEDICATIONS  (PRN):  acetaminophen   Tablet .. 650 milliGRAM(s) Oral every 6 hours PRN Temp greater or equal to 38C (100.4F), Moderate Pain (4 - 6)  HYDROmorphone  Injectable 0.5 milliGRAM(s) IV Push every 4 hours PRN Severe Pain (7 - 10)  PHENobarbital Injectable 130 milliGRAM(s) IV Push every 15 minutes PRN for CIWA > 12  PHENobarbital Injectable 260 milliGRAM(s) IV Push every 15 minutes PRN ciwa > 20

## 2019-06-24 NOTE — PROGRESS NOTE ADULT - ASSESSMENT
HPI:  53 yo m with pmh of alcohol abuse and pud presents in ED c/o shakiness, bloody vomit and black "hard" stool the last few days. patient reports he has not drank in 2 days. No fever/chills, No photophobia/eye pain/changes in vision, No ear pain/sore throat/dysphagia, No chest pain/palpitations, no SOB/cough/wheeze/stridor, No abdominal pain, No D, no dysuria/frequency/discharge, No neck/back pain, no rash, no changes in neurological status/function. he was admitted two weeks ago for the same problem and had endoscopy that showed   ESOPHAGUS:  Mild narrowing in UES ( minimal tension necessary ), Superficial ulceration / esophagitis lower 1/2 of esophagus s/p biopsy    STOMACH: mild antral gastritis s/p biopsy    DUODENUM: WNL  and was discharged on a ppi and carfate but continued to drink and only stoppede two days ago (20 Jun 2019 12:26)  ------------------- As Above ------------------------------------  Patient is a poor historian. Patient states that he had been vomiting red / black x 2 days. A/W abdominal pain. He states that he stopped drinking for two days. Patient refuses to tell me how much he was drinking. Patient denies melena and hematochezia but told the ER staff that he had black stool. ( ER reports the patient had heme (-) stool.  The patient states that he was vomiting blood in the ER but the nurse states he is only spitting mucus.   Patient recently in [facility] with ETOH abuse and upper GI bleed. See EGD report above   See labs. / CT scan     Upper GI Bleed (?- unwitnessed HGB 15.4 )  - NPO / IV fluid / IV PPI / Carafate    [id number] - No signs of any active GI problems at present time . Supportive care for now

## 2019-06-24 NOTE — PROGRESS NOTE ADULT - ASSESSMENT
53 y/o M w/alcohol abuse presenting with alcohol withdrawal with DTs.     - Increase phenobarb to 260mg q6hrs standing as patient requiring multiple doses of PRN meds  - Phenobarb 130mg PRN  - Thiamine, folate, MVI  - PPI/Heparin SC    Attending critical care time 35 minutes 53 y/o M w/alcohol abuse presenting with alcohol withdrawal with DTs.     - Increase phenobarb to 260mg q6hrs standing as patient requiring multiple doses of PRN meds  - Phenobarb 130mg PRN  - Thiamine, folate, MVI  - PPI/Heparin SC

## 2019-06-24 NOTE — PROGRESS NOTE ADULT - SUBJECTIVE AND OBJECTIVE BOX
Patient is a 54y old  Male who presents with a chief complaint of vomiting blood (24 Jun 2019 09:05)      HPI:  55 yo m with pmh of alcohol abuse and pud presents in ED c/o shakiness, bloody vomit and black "hard" stool the last few days. patient reports he has not drank in 2 days. No fever/chills, No photophobia/eye pain/changes in vision, No ear pain/sore throat/dysphagia, No chest pain/palpitations, no SOB/cough/wheeze/stridor, No abdominal pain, No D, no dysuria/frequency/discharge, No neck/back pain, no rash, no changes in neurological status/function. 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 650 milliGRAM(s) Oral every 6 hours PRN Temp greater or equal to 38C (100.4F), Moderate Pain (4 - 6)  HYDROmorphone  Injectable 0.5 milliGRAM(s) IV Push every 4 hours PRN Severe Pain (7 - 10)  PHENobarbital Injectable 130 milliGRAM(s) IV Push every 15 minutes PRN for CIWA > 12  PHENobarbital Injectable 260 milliGRAM(s) IV Push every 15 minutes PRN ciwa > 20      FAMILY HISTORY:  No pertinent family history in first degree relatives (Father, Mother)      Allergies    No Known Allergies    Intolerances        PMH/PSH:  EtOH dependence  Mixed hyperlipidemia  PUD (peptic ulcer disease)  Alcohol abuse  No significant past surgical history        REVIEW OF SYSTEMS:  CONSTITUTIONAL: No fever, weight loss,  RESPIRATORY: No cough, wheezing, chills or hemoptysis; No shortness of breath  CARDIOVASCULAR: No chest pain, palpitations, dizziness, or leg swelling  GASTROINTESTINAL: No abdominal or epigastric pain. No nausea, vomiting, or hematemesis; No diarrhea or constipation. No melena or hematochezia.    Vital Signs Last 24 Hrs  T(C): 37.7 (24 Jun 2019 15:27), Max: 37.9 (24 Jun 2019 07:15)  T(F): 99.9 (24 Jun 2019 15:27), Max: 100.3 (24 Jun 2019 07:15)  HR: 94 (24 Jun 2019 16:00) (85 - 144)  BP: 143/90 (24 Jun 2019 16:00) (121/73 - 154/116)  BP(mean): 103 (24 Jun 2019 16:00) (84 - 124)  RR: 26 (24 Jun 2019 16:00) (19 - 51)  SpO2: 97% (24 Jun 2019 16:00) (74% - 100%)    PHYSICAL EXAM:  GENERAL: NAD, well-groomed, well-developed  CHEST/LUNG: Clear to percussion bilaterally; No rales, rhonchi, wheezing, or rubs  HEART: Regular rate and rhythm; No murmurs, rubs, or gallops  ABDOMEN: Soft, Nontender, Nondistended; Bowel sounds present      LAB  06-20 @ 07:03  amylase --   lipase 157                           12.6   5.93  )-----------( 209      ( 24 Jun 2019 04:20 )             38.4       CBC:  06-24 @ 04:20  WBC 5.93   Hgb 12.6   Hct 38.4   Plts 209  MCV 86.3  06-23 @ 04:09  WBC 6.05   Hgb 13.0   Hct 40.4   Plts 214  MCV 86.3  06-22 @ 04:17  WBC 4.42   Hgb 13.2   Hct 41.3   Plts 191  MCV 87.3  06-21 @ 10:06  WBC 4.64   Hgb 12.2   Hct 38.4   Plts 187  MCV 87.7  06-21 @ 06:24  WBC 4.60   Hgb 11.4   Hct 35.0   Plts 161  MCV 87.3  06-21 @ 00:27  WBC 5.91   Hgb 12.3   Hct 38.6   Plts 201  MCV 88.5  06-20 @ 07:03  WBC 9.80   Hgb 15.4   Hct 46.8   Plts 270  MCV 83.7      Chemistry:  06-24 @ 04:20  Na+ 140  K+ 3.1  Cl- 110  CO2 20  BUN 7  Cr 0.79     06-23 @ 08:54  Na+ 140  K+ 4.7  Cl- 110  CO2 21  BUN 11  Cr 1.09     06-23 @ 04:09  Na+ 140  K+ 2.9  Cl- 107  CO2 22  BUN 9  Cr 0.85     06-22 @ 04:17  Na+ 143  K+ 3.4  Cl- 108  CO2 23  BUN 6  Cr 0.77     06-21 @ 10:06  Na+ 142  K+ 3.3  Cl- 108  CO2 23  BUN 7  Cr 1.05     06-21 @ 06:24  Na+ 140  K+ 3.5  Cl- 106  CO2 26  BUN 7  Cr 0.93     06-21 @ 00:27  Na+ 142  K+ 3.3  Cl- 106  CO2 27  BUN 8  Cr 0.87     06-20 @ 07:03  Na+ 140  K+ 3.5  Cl- 94  CO2 29  BUN 11  Cr 1.19         Glucose, Serum: 76 mg/dL (06-24 @ 04:20)  Glucose, Serum: 153 mg/dL (06-23 @ 08:54)  Glucose, Serum: 76 mg/dL (06-23 @ 04:09)  Glucose, Serum: 82 mg/dL (06-22 @ 04:17)  Glucose, Serum: 129 mg/dL (06-21 @ 10:06)  Glucose, Serum: 103 mg/dL (06-21 @ 06:24)  Glucose, Serum: 75 mg/dL (06-21 @ 00:27)  Glucose, Serum: 202 mg/dL (06-20 @ 07:03)      24 Jun 2019 04:20    140    |  110    |  7      ----------------------------<  76     3.1     |  20     |  0.79   23 Jun 2019 08:54    140    |  110    |  11     ----------------------------<  153    4.7     |  21     |  1.09   23 Jun 2019 04:09    140    |  107    |  9      ----------------------------<  76     2.9     |  22     |  0.85   22 Jun 2019 04:17    143    |  108    |  6      ----------------------------<  82     3.4     |  23     |  0.77   21 Jun 2019 10:06    142    |  108    |  7      ----------------------------<  129    3.3     |  23     |  1.05   21 Jun 2019 06:24    140    |  106    |  7      ----------------------------<  103    3.5     |  26     |  0.93   21 Jun 2019 00:27    142    |  106    |  8      ----------------------------<  75     3.3     |  27     |  0.87     Ca    8.7        24 Jun 2019 04:20  Ca    8.4        23 Jun 2019 08:54  Ca    8.5        23 Jun 2019 04:09  Ca    8.8        22 Jun 2019 04:17  Ca    8.5        21 Jun 2019 10:06  Ca    8.2        21 Jun 2019 06:24  Ca    8.1        21 Jun 2019 00:27  Phos  2.8       24 Jun 2019 04:20  Phos  3.6       23 Jun 2019 05:41  Phos  2.2       22 Jun 2019 04:17  Phos  1.5       21 Jun 2019 10:06  Phos  1.8       21 Jun 2019 06:24  Phos  1.8       21 Jun 2019 00:27  Mg     2.1       24 Jun 2019 04:20  Mg     2.3       23 Jun 2019 04:09  Mg     2.3       22 Jun 2019 04:17  Mg     1.9       21 Jun 2019 10:06  Mg     2.1       21 Jun 2019 06:24  Mg     2.0       21 Jun 2019 00:27  Mg     1.8       20 Jun 2019 10:42    TPro  7.3    /  Alb  3.2    /  TBili  0.7    /  DBili  x      /  AST  17     /  ALT  25     /  AlkPhos  40     24 Jun 2019 04:20  TPro  7.0    /  Alb  3.2    /  TBili  1.1    /  DBili  x      /  AST  22     /  ALT  30     /  AlkPhos  41     23 Jun 2019 04:09  TPro  7.3    /  Alb  3.4    /  TBili  1.2    /  DBili  x      /  AST  40     /  ALT  37     /  AlkPhos  41     22 Jun 2019 04:17  TPro  7.2    /  Alb  3.7    /  TBili  1.3    /  DBili  .34    /  AST  34     /  ALT  33     /  AlkPhos  39     21 Jun 2019 10:06  TPro  7.1    /  Alb  3.4    /  TBili  1.1    /  DBili  .23    /  AST  29     /  ALT  34     /  AlkPhos  39     21 Jun 2019 00:27  TPro  9.2    /  Alb  4.6    /  TBili  1.1    /  DBili  x      /  AST  46     /  ALT  49     /  AlkPhos  55     20 Jun 2019 07:03              CAPILLARY BLOOD GLUCOSE              RADIOLOGY & ADDITIONAL TESTS:    Imaging Personally Reviewed:  [ ] YES  [ ] NO    Consultant(s) Notes Reviewed:  [ ] YES  [ ] NO    Care Discussed with Consultants/Other Providers [ ] YES  [ ] NO

## 2019-06-25 LAB
ANION GAP SERPL CALC-SCNC: 9 MMOL/L — SIGNIFICANT CHANGE UP (ref 5–17)
BUN SERPL-MCNC: 9 MG/DL — SIGNIFICANT CHANGE UP (ref 7–23)
CALCIUM SERPL-MCNC: 9 MG/DL — SIGNIFICANT CHANGE UP (ref 8.5–10.1)
CHLORIDE SERPL-SCNC: 109 MMOL/L — HIGH (ref 96–108)
CO2 SERPL-SCNC: 23 MMOL/L — SIGNIFICANT CHANGE UP (ref 22–31)
CREAT SERPL-MCNC: 0.78 MG/DL — SIGNIFICANT CHANGE UP (ref 0.5–1.3)
GLUCOSE SERPL-MCNC: 72 MG/DL — SIGNIFICANT CHANGE UP (ref 70–99)
HCT VFR BLD CALC: 38.8 % — LOW (ref 39–50)
HGB BLD-MCNC: 12.7 G/DL — LOW (ref 13–17)
MAGNESIUM SERPL-MCNC: 2.4 MG/DL — SIGNIFICANT CHANGE UP (ref 1.6–2.6)
MCHC RBC-ENTMCNC: 28.4 PG — SIGNIFICANT CHANGE UP (ref 27–34)
MCHC RBC-ENTMCNC: 32.7 GM/DL — SIGNIFICANT CHANGE UP (ref 32–36)
MCV RBC AUTO: 86.8 FL — SIGNIFICANT CHANGE UP (ref 80–100)
NRBC # BLD: 0 /100 WBCS — SIGNIFICANT CHANGE UP (ref 0–0)
PHOSPHATE SERPL-MCNC: 3.6 MG/DL — SIGNIFICANT CHANGE UP (ref 2.5–4.5)
PLATELET # BLD AUTO: 225 K/UL — SIGNIFICANT CHANGE UP (ref 150–400)
POTASSIUM SERPL-MCNC: 3.1 MMOL/L — LOW (ref 3.5–5.3)
POTASSIUM SERPL-SCNC: 3.1 MMOL/L — LOW (ref 3.5–5.3)
RBC # BLD: 4.47 M/UL — SIGNIFICANT CHANGE UP (ref 4.2–5.8)
RBC # FLD: 15.3 % — HIGH (ref 10.3–14.5)
SODIUM SERPL-SCNC: 141 MMOL/L — SIGNIFICANT CHANGE UP (ref 135–145)
WBC # BLD: 4.36 K/UL — SIGNIFICANT CHANGE UP (ref 3.8–10.5)
WBC # FLD AUTO: 4.36 K/UL — SIGNIFICANT CHANGE UP (ref 3.8–10.5)

## 2019-06-25 PROCEDURE — 99233 SBSQ HOSP IP/OBS HIGH 50: CPT

## 2019-06-25 RX ORDER — BACITRACIN ZINC 500 UNIT/G
1 OINTMENT IN PACKET (EA) TOPICAL THREE TIMES A DAY
Refills: 0 | Status: DISCONTINUED | OUTPATIENT
Start: 2019-06-25 | End: 2019-07-08

## 2019-06-25 RX ORDER — POTASSIUM PHOSPHATE, MONOBASIC POTASSIUM PHOSPHATE, DIBASIC 236; 224 MG/ML; MG/ML
15 INJECTION, SOLUTION INTRAVENOUS ONCE
Refills: 0 | Status: COMPLETED | OUTPATIENT
Start: 2019-06-25 | End: 2019-06-25

## 2019-06-25 RX ORDER — PHENOBARBITAL 60 MG
130 TABLET ORAL EVERY 6 HOURS
Refills: 0 | Status: DISCONTINUED | OUTPATIENT
Start: 2019-06-25 | End: 2019-06-26

## 2019-06-25 RX ORDER — HALOPERIDOL DECANOATE 100 MG/ML
5 INJECTION INTRAMUSCULAR ONCE
Refills: 0 | Status: COMPLETED | OUTPATIENT
Start: 2019-06-25 | End: 2019-06-25

## 2019-06-25 RX ADMIN — HEPARIN SODIUM 5000 UNIT(S): 5000 INJECTION INTRAVENOUS; SUBCUTANEOUS at 13:16

## 2019-06-25 RX ADMIN — Medication 1 GRAM(S): at 06:20

## 2019-06-25 RX ADMIN — Medication 130 MILLIGRAM(S): at 17:56

## 2019-06-25 RX ADMIN — CHLORHEXIDINE GLUCONATE 1 APPLICATION(S): 213 SOLUTION TOPICAL at 06:11

## 2019-06-25 RX ADMIN — HEPARIN SODIUM 5000 UNIT(S): 5000 INJECTION INTRAVENOUS; SUBCUTANEOUS at 21:30

## 2019-06-25 RX ADMIN — POTASSIUM PHOSPHATE, MONOBASIC POTASSIUM PHOSPHATE, DIBASIC 62.5 MILLIMOLE(S): 236; 224 INJECTION, SOLUTION INTRAVENOUS at 08:12

## 2019-06-25 RX ADMIN — Medication 260 MILLIGRAM(S): at 22:57

## 2019-06-25 RX ADMIN — Medication 130 MILLIGRAM(S): at 19:47

## 2019-06-25 RX ADMIN — PANTOPRAZOLE SODIUM 40 MILLIGRAM(S): 20 TABLET, DELAYED RELEASE ORAL at 17:56

## 2019-06-25 RX ADMIN — Medication 130 MILLIGRAM(S): at 11:05

## 2019-06-25 RX ADMIN — Medication 130 MILLIGRAM(S): at 19:30

## 2019-06-25 RX ADMIN — Medication 1 GRAM(S): at 17:56

## 2019-06-25 RX ADMIN — Medication 1 APPLICATION(S): at 13:16

## 2019-06-25 RX ADMIN — Medication 1 APPLICATION(S): at 21:30

## 2019-06-25 RX ADMIN — Medication 130 MILLIGRAM(S): at 20:55

## 2019-06-25 RX ADMIN — HEPARIN SODIUM 5000 UNIT(S): 5000 INJECTION INTRAVENOUS; SUBCUTANEOUS at 06:11

## 2019-06-25 RX ADMIN — PANTOPRAZOLE SODIUM 40 MILLIGRAM(S): 20 TABLET, DELAYED RELEASE ORAL at 06:10

## 2019-06-25 RX ADMIN — Medication 130 MILLIGRAM(S): at 06:11

## 2019-06-25 RX ADMIN — RISPERIDONE 1 MILLIGRAM(S): 4 TABLET ORAL at 17:56

## 2019-06-25 RX ADMIN — RISPERIDONE 1 MILLIGRAM(S): 4 TABLET ORAL at 06:11

## 2019-06-25 RX ADMIN — Medication 260 MILLIGRAM(S): at 21:08

## 2019-06-25 RX ADMIN — Medication 130 MILLIGRAM(S): at 18:50

## 2019-06-25 RX ADMIN — Medication 130 MILLIGRAM(S): at 19:06

## 2019-06-25 NOTE — PROGRESS NOTE ADULT - ASSESSMENT
53 y/o M w/alcohol abuse presenting with alcohol withdrawal with DTs.     - Decrease phenobarb to 130mg q6hrs standing as patient did not require any PRN dosing and too sedated on 260.  - Phenobarb 130mg PRN  - Thiamine, folate, MVI  - PPI/Heparin SC

## 2019-06-25 NOTE — PROGRESS NOTE ADULT - SUBJECTIVE AND OBJECTIVE BOX
HPI:  55 yo m with pmh of alcohol abuse and pud presents in ED c/o shakiness, bloody vomit and black "hard" stool the last few days. patient reports he has not drank in 2 days. No fever/chills, No photophobia/eye pain/changes in vision, No ear pain/sore throat/dysphagia, No chest pain/palpitations, no SOB/cough/wheeze/stridor, No abdominal pain, No D, no dysuria/frequency/discharge, No neck/back pain, no rash, no changes in neurological status/function. he was admitted two weeks ago for the same problem and had endoscopy that showed   ESOPHAGUS:  Mild narrowing in UES ( minimal tension necessary ), Superficial ulceration / esophagitis lower 1/2 of esophagus s/p biopsy    STOMACH: mild antral gastritis s/p biopsy    DUODENUM: WNL  and was discharged on a ppi and carfate but continued to drink and only stoppede two days ago (20 Jun 2019 12:26)      24 hr events: No acute events. Did not require any PRN doses yesterday. Remains confused unable to provide history.    ## ROS:  [x ] unable to obtain    ## Vitals  ICU Vital Signs Last 24 Hrs  T(C): 37.1 (25 Jun 2019 07:00), Max: 37.7 (24 Jun 2019 15:10)  T(F): 98.7 (25 Jun 2019 07:00), Max: 99.9 (24 Jun 2019 15:10)  HR: 94 (25 Jun 2019 07:00) (79 - 104)  BP: 141/90 (25 Jun 2019 07:00) (102/79 - 143/90)  BP(mean): 100 (25 Jun 2019 07:00) (84 - 106)  ABP: --  ABP(mean): --  RR: 19 (25 Jun 2019 07:00) (18 - 51)  SpO2: 98% (25 Jun 2019 07:00) (88% - 99%)      ## Physical Exam:  Gen:  HEENT:  Resp:  CV:  Abd:  Ext:  Neuro:    ## Vent Data      ## Labs:  Chem:  06-25    141  |  109<H>  |  9   ----------------------------<  72  3.1<L>   |  23  |  0.78    Ca    9.0      25 Jun 2019 04:26  Phos  3.6     06-25  Mg     2.4     06-25    TPro  7.3  /  Alb  3.2<L>  /  TBili  0.7  /  DBili  x   /  AST  17  /  ALT  25  /  AlkPhos  40  06-24    LIVER FUNCTIONS - ( 24 Jun 2019 04:20 )  Alb: 3.2 g/dL / Pro: 7.3 gm/dL / ALK PHOS: 40 U/L / ALT: 25 U/L / AST: 17 U/L / GGT: x           CBC:                        12.7   4.36  )-----------( 225      ( 25 Jun 2019 04:26 )             38.8     Coags:          ## Cardiac        ## Blood Gas      #I/Os  I&O's Detail    24 Jun 2019 07:01  -  25 Jun 2019 07:00  --------------------------------------------------------  IN:    multivitamin/thiamine/folic acid in sodium chloride 0.9%: 84 mL    Oral Fluid: 240 mL    Solution: 200 mL  Total IN: 524 mL    OUT:    Incontinent per Condom Catheter: 702 mL  Total OUT: 702 mL    Total NET: -178 mL          ## Imaging:    ## Medications:  MEDICATIONS  (STANDING):  chlorhexidine 4% Liquid 1 Application(s) Topical <User Schedule>  heparin  Injectable 5000 Unit(s) SubCutaneous every 8 hours  pantoprazole  Injectable 40 milliGRAM(s) IV Push every 12 hours  PHENobarbital Injectable 260 milliGRAM(s) IV Push every 6 hours  PHENobarbital Injectable 130 milliGRAM(s) IV Push once  risperiDONE   Tablet 1 milliGRAM(s) Oral two times a day  sucralfate suspension 1 Gram(s) Oral four times a day    MEDICATIONS  (PRN):  acetaminophen   Tablet .. 650 milliGRAM(s) Oral every 6 hours PRN Temp greater or equal to 38C (100.4F), Moderate Pain (4 - 6)  HYDROmorphone  Injectable 0.5 milliGRAM(s) IV Push every 4 hours PRN Severe Pain (7 - 10)  PHENobarbital Injectable 130 milliGRAM(s) IV Push every 15 minutes PRN for CIWA > 12  PHENobarbital Injectable 260 milliGRAM(s) IV Push every 15 minutes PRN ciwa > 20 HPI:  55 yo m with pmh of alcohol abuse and pud presents in ED c/o shakiness, bloody vomit and black "hard" stool the last few days. patient reports he has not drank in 2 days. No fever/chills, No photophobia/eye pain/changes in vision, No ear pain/sore throat/dysphagia, No chest pain/palpitations, no SOB/cough/wheeze/stridor, No abdominal pain, No D, no dysuria/frequency/discharge, No neck/back pain, no rash, no changes in neurological status/function. he was admitted two weeks ago for the same problem and had endoscopy that showed   ESOPHAGUS:  Mild narrowing in UES ( minimal tension necessary ), Superficial ulceration / esophagitis lower 1/2 of esophagus s/p biopsy    STOMACH: mild antral gastritis s/p biopsy    DUODENUM: WNL  and was discharged on a ppi and carfate but continued to drink and only stoppede two days ago (20 Jun 2019 12:26)      24 hr events: No acute events. Did not require any PRN doses yesterday. Remains confused only able to provide partial history.    ## ROS:  [x ] unable to obtain    ## Vitals  ICU Vital Signs Last 24 Hrs  T(C): 37.1 (25 Jun 2019 07:00), Max: 37.7 (24 Jun 2019 15:10)  T(F): 98.7 (25 Jun 2019 07:00), Max: 99.9 (24 Jun 2019 15:10)  HR: 94 (25 Jun 2019 07:00) (79 - 104)  BP: 141/90 (25 Jun 2019 07:00) (102/79 - 143/90)  BP(mean): 100 (25 Jun 2019 07:00) (84 - 106)  ABP: --  ABP(mean): --  RR: 19 (25 Jun 2019 07:00) (18 - 51)  SpO2: 98% (25 Jun 2019 07:00) (88% - 99%)      ## Physical Exam:  Gen: Adult male sitting in bed, confused, occasionally follows commands  HEENT: NC/AT sclerae anicteric  Resp: No increased WOB, CTAB  CV: S1, S2  Abd: Soft, + BS  Ext: No edema  Neuro: Awake, confused, not following commands, moves all extremities    ## Vent Data      ## Labs:  Chem:  06-25    141  |  109<H>  |  9   ----------------------------<  72  3.1<L>   |  23  |  0.78    Ca    9.0      25 Jun 2019 04:26  Phos  3.6     06-25  Mg     2.4     06-25    TPro  7.3  /  Alb  3.2<L>  /  TBili  0.7  /  DBili  x   /  AST  17  /  ALT  25  /  AlkPhos  40  06-24    LIVER FUNCTIONS - ( 24 Jun 2019 04:20 )  Alb: 3.2 g/dL / Pro: 7.3 gm/dL / ALK PHOS: 40 U/L / ALT: 25 U/L / AST: 17 U/L / GGT: x           CBC:                        12.7   4.36  )-----------( 225      ( 25 Jun 2019 04:26 )             38.8     Coags:          ## Cardiac        ## Blood Gas      #I/Os  I&O's Detail    24 Jun 2019 07:01  -  25 Jun 2019 07:00  --------------------------------------------------------  IN:    multivitamin/thiamine/folic acid in sodium chloride 0.9%: 84 mL    Oral Fluid: 240 mL    Solution: 200 mL  Total IN: 524 mL    OUT:    Incontinent per Condom Catheter: 702 mL  Total OUT: 702 mL    Total NET: -178 mL          ## Imaging:    ## Medications:  MEDICATIONS  (STANDING):  chlorhexidine 4% Liquid 1 Application(s) Topical <User Schedule>  heparin  Injectable 5000 Unit(s) SubCutaneous every 8 hours  pantoprazole  Injectable 40 milliGRAM(s) IV Push every 12 hours  PHENobarbital Injectable 260 milliGRAM(s) IV Push every 6 hours  PHENobarbital Injectable 130 milliGRAM(s) IV Push once  risperiDONE   Tablet 1 milliGRAM(s) Oral two times a day  sucralfate suspension 1 Gram(s) Oral four times a day    MEDICATIONS  (PRN):  acetaminophen   Tablet .. 650 milliGRAM(s) Oral every 6 hours PRN Temp greater or equal to 38C (100.4F), Moderate Pain (4 - 6)  HYDROmorphone  Injectable 0.5 milliGRAM(s) IV Push every 4 hours PRN Severe Pain (7 - 10)  PHENobarbital Injectable 130 milliGRAM(s) IV Push every 15 minutes PRN for CIWA > 12  PHENobarbital Injectable 260 milliGRAM(s) IV Push every 15 minutes PRN ciwa > 20

## 2019-06-26 LAB
ANION GAP SERPL CALC-SCNC: 9 MMOL/L — SIGNIFICANT CHANGE UP (ref 5–17)
BUN SERPL-MCNC: 10 MG/DL — SIGNIFICANT CHANGE UP (ref 7–23)
CALCIUM SERPL-MCNC: 8.9 MG/DL — SIGNIFICANT CHANGE UP (ref 8.5–10.1)
CHLORIDE SERPL-SCNC: 106 MMOL/L — SIGNIFICANT CHANGE UP (ref 96–108)
CO2 SERPL-SCNC: 24 MMOL/L — SIGNIFICANT CHANGE UP (ref 22–31)
CREAT SERPL-MCNC: 0.72 MG/DL — SIGNIFICANT CHANGE UP (ref 0.5–1.3)
GLUCOSE BLDC GLUCOMTR-MCNC: 77 MG/DL — SIGNIFICANT CHANGE UP (ref 70–99)
GLUCOSE BLDC GLUCOMTR-MCNC: 91 MG/DL — SIGNIFICANT CHANGE UP (ref 70–99)
GLUCOSE SERPL-MCNC: 81 MG/DL — SIGNIFICANT CHANGE UP (ref 70–99)
HCT VFR BLD CALC: 41.6 % — SIGNIFICANT CHANGE UP (ref 39–50)
HGB BLD-MCNC: 13.4 G/DL — SIGNIFICANT CHANGE UP (ref 13–17)
MAGNESIUM SERPL-MCNC: 2.4 MG/DL — SIGNIFICANT CHANGE UP (ref 1.6–2.6)
MCHC RBC-ENTMCNC: 28 PG — SIGNIFICANT CHANGE UP (ref 27–34)
MCHC RBC-ENTMCNC: 32.2 GM/DL — SIGNIFICANT CHANGE UP (ref 32–36)
MCV RBC AUTO: 86.8 FL — SIGNIFICANT CHANGE UP (ref 80–100)
NRBC # BLD: 0 /100 WBCS — SIGNIFICANT CHANGE UP (ref 0–0)
PHOSPHATE SERPL-MCNC: 3.1 MG/DL — SIGNIFICANT CHANGE UP (ref 2.5–4.5)
PLATELET # BLD AUTO: 206 K/UL — SIGNIFICANT CHANGE UP (ref 150–400)
POTASSIUM SERPL-MCNC: 3.3 MMOL/L — LOW (ref 3.5–5.3)
POTASSIUM SERPL-SCNC: 3.3 MMOL/L — LOW (ref 3.5–5.3)
RBC # BLD: 4.79 M/UL — SIGNIFICANT CHANGE UP (ref 4.2–5.8)
RBC # FLD: 15 % — HIGH (ref 10.3–14.5)
SODIUM SERPL-SCNC: 139 MMOL/L — SIGNIFICANT CHANGE UP (ref 135–145)
WBC # BLD: 2.57 K/UL — LOW (ref 3.8–10.5)
WBC # FLD AUTO: 2.57 K/UL — LOW (ref 3.8–10.5)

## 2019-06-26 PROCEDURE — 99233 SBSQ HOSP IP/OBS HIGH 50: CPT

## 2019-06-26 RX ORDER — HALOPERIDOL DECANOATE 100 MG/ML
5 INJECTION INTRAMUSCULAR ONCE
Refills: 0 | Status: COMPLETED | OUTPATIENT
Start: 2019-06-26 | End: 2019-06-27

## 2019-06-26 RX ORDER — POTASSIUM CHLORIDE 20 MEQ
20 PACKET (EA) ORAL ONCE
Refills: 0 | Status: DISCONTINUED | OUTPATIENT
Start: 2019-06-26 | End: 2019-06-26

## 2019-06-26 RX ORDER — PHENOBARBITAL 60 MG
195 TABLET ORAL EVERY 6 HOURS
Refills: 0 | Status: DISCONTINUED | OUTPATIENT
Start: 2019-06-26 | End: 2019-06-29

## 2019-06-26 RX ORDER — POTASSIUM CHLORIDE 20 MEQ
10 PACKET (EA) ORAL
Refills: 0 | Status: COMPLETED | OUTPATIENT
Start: 2019-06-26 | End: 2019-06-26

## 2019-06-26 RX ADMIN — Medication 1 APPLICATION(S): at 14:30

## 2019-06-26 RX ADMIN — PANTOPRAZOLE SODIUM 40 MILLIGRAM(S): 20 TABLET, DELAYED RELEASE ORAL at 17:43

## 2019-06-26 RX ADMIN — HEPARIN SODIUM 5000 UNIT(S): 5000 INJECTION INTRAVENOUS; SUBCUTANEOUS at 14:29

## 2019-06-26 RX ADMIN — HEPARIN SODIUM 5000 UNIT(S): 5000 INJECTION INTRAVENOUS; SUBCUTANEOUS at 21:06

## 2019-06-26 RX ADMIN — Medication 1 APPLICATION(S): at 21:06

## 2019-06-26 RX ADMIN — HEPARIN SODIUM 5000 UNIT(S): 5000 INJECTION INTRAVENOUS; SUBCUTANEOUS at 05:46

## 2019-06-26 RX ADMIN — RISPERIDONE 1 MILLIGRAM(S): 4 TABLET ORAL at 17:44

## 2019-06-26 RX ADMIN — Medication 195 MILLIGRAM(S): at 11:19

## 2019-06-26 RX ADMIN — Medication 1 APPLICATION(S): at 05:46

## 2019-06-26 RX ADMIN — Medication 130 MILLIGRAM(S): at 00:22

## 2019-06-26 RX ADMIN — Medication 195 MILLIGRAM(S): at 17:44

## 2019-06-26 RX ADMIN — Medication 100 MILLIEQUIVALENT(S): at 08:10

## 2019-06-26 RX ADMIN — Medication 130 MILLIGRAM(S): at 16:39

## 2019-06-26 RX ADMIN — CHLORHEXIDINE GLUCONATE 1 APPLICATION(S): 213 SOLUTION TOPICAL at 05:47

## 2019-06-26 RX ADMIN — Medication 100 MILLIEQUIVALENT(S): at 06:30

## 2019-06-26 RX ADMIN — Medication 130 MILLIGRAM(S): at 18:49

## 2019-06-26 RX ADMIN — Medication 1 GRAM(S): at 12:27

## 2019-06-26 RX ADMIN — Medication 130 MILLIGRAM(S): at 14:33

## 2019-06-26 RX ADMIN — Medication 1 GRAM(S): at 17:43

## 2019-06-26 RX ADMIN — Medication 130 MILLIGRAM(S): at 12:26

## 2019-06-26 RX ADMIN — PANTOPRAZOLE SODIUM 40 MILLIGRAM(S): 20 TABLET, DELAYED RELEASE ORAL at 05:45

## 2019-06-26 NOTE — PROGRESS NOTE ADULT - SUBJECTIVE AND OBJECTIVE BOX
HPI:  53 yo m with pmh of alcohol abuse and pud presents in ED c/o shakiness, bloody vomit and black "hard" stool the last few days. patient reports he has not drank in 2 days. No fever/chills, No photophobia/eye pain/changes in vision, No ear pain/sore throat/dysphagia, No chest pain/palpitations, no SOB/cough/wheeze/stridor, No abdominal pain, No D, no dysuria/frequency/discharge, No neck/back pain, no rash, no changes in neurological status/function. he was admitted two weeks ago for the same problem and had endoscopy that showed   ESOPHAGUS:  Mild narrowing in UES ( minimal tension necessary ), Superficial ulceration / esophagitis lower 1/2 of esophagus s/p biopsy    STOMACH: mild antral gastritis s/p biopsy    DUODENUM: WNL  and was discharged on a ppi and carfate but continued to drink and only stoppede two days ago (20 Jun 2019 12:26)      24 hr events: Became very agitated last night, received multiple doses PRN phenobarb and haldol. Currently sedated and not arousable    ## ROS:  [x ] unable to obtain    ## Vitals  ICU Vital Signs Last 24 Hrs  T(C): 36.3 (26 Jun 2019 07:24), Max: 36.9 (25 Jun 2019 19:35)  T(F): 97.3 (26 Jun 2019 07:24), Max: 98.5 (25 Jun 2019 19:35)  HR: 68 (26 Jun 2019 09:00) (66 - 119)  BP: 101/67 (26 Jun 2019 09:00) (92/58 - 152/83)  BP(mean): 76 (26 Jun 2019 09:00) (67 - 117)  ABP: --  ABP(mean): --  RR: 16 (26 Jun 2019 09:00) (13 - 33)  SpO2: 98% (26 Jun 2019 09:00) (91% - 100%)      ## Physical Exam:  Gen: Adult male lying in bed, sedated, not arousable  HEENT: NC/AT sclerae anicteric  Resp: No increased WOB, CTAB  CV: S1, S2  Abd: Soft, + BS  Ext: No edema  Neuro: Sedated, not arousable    ## Vent Data      ## Labs:  Chem:  06-26    139  |  106  |  10  ----------------------------<  81  3.3<L>   |  24  |  0.72    Ca    8.9      26 Jun 2019 04:15  Phos  3.1     06-26  Mg     2.4     06-26        CBC:                        13.4   2.57  )-----------( 206      ( 26 Jun 2019 04:15 )             41.6     Coags:          ## Cardiac        ## Blood Gas      #I/Os  I&O's Detail    25 Jun 2019 07:01  -  26 Jun 2019 07:00  --------------------------------------------------------  IN:    Oral Fluid: 900 mL    Solution: 100 mL    Solution: 250 mL  Total IN: 1250 mL    OUT:    Incontinent per Condom Catheter: 300 mL  Total OUT: 300 mL    Total NET: 950 mL          ## Imaging:    ## Medications:  MEDICATIONS  (STANDING):  BACItracin   Ointment 1 Application(s) Topical three times a day  chlorhexidine 4% Liquid 1 Application(s) Topical <User Schedule>  heparin  Injectable 5000 Unit(s) SubCutaneous every 8 hours  pantoprazole  Injectable 40 milliGRAM(s) IV Push every 12 hours  PHENobarbital Injectable 130 milliGRAM(s) IV Push once  risperiDONE   Tablet 1 milliGRAM(s) Oral two times a day  sucralfate suspension 1 Gram(s) Oral four times a day    MEDICATIONS  (PRN):  acetaminophen   Tablet .. 650 milliGRAM(s) Oral every 6 hours PRN Temp greater or equal to 38C (100.4F), Moderate Pain (4 - 6)  HYDROmorphone  Injectable 0.5 milliGRAM(s) IV Push every 4 hours PRN Severe Pain (7 - 10)  PHENobarbital Injectable 130 milliGRAM(s) IV Push every 15 minutes PRN for CIWA > 12  PHENobarbital Injectable 260 milliGRAM(s) IV Push every 15 minutes PRN ciwa > 20

## 2019-06-26 NOTE — PROGRESS NOTE ADULT - ASSESSMENT
53 y/o M w/alcohol abuse presenting with alcohol withdrawal with DTs.     - Increase phenobarb to 195mg q6hrs standing as patient required multiple PRN doses  - Phenobarb 130mg PRN  - Thiamine, folate, MVI  - PPI/Heparin SC

## 2019-06-27 LAB
ANION GAP SERPL CALC-SCNC: 8 MMOL/L — SIGNIFICANT CHANGE UP (ref 5–17)
BUN SERPL-MCNC: 9 MG/DL — SIGNIFICANT CHANGE UP (ref 7–23)
CALCIUM SERPL-MCNC: 9.2 MG/DL — SIGNIFICANT CHANGE UP (ref 8.5–10.1)
CHLORIDE SERPL-SCNC: 107 MMOL/L — SIGNIFICANT CHANGE UP (ref 96–108)
CO2 SERPL-SCNC: 24 MMOL/L — SIGNIFICANT CHANGE UP (ref 22–31)
CREAT SERPL-MCNC: 0.71 MG/DL — SIGNIFICANT CHANGE UP (ref 0.5–1.3)
GLUCOSE BLDC GLUCOMTR-MCNC: 71 MG/DL — SIGNIFICANT CHANGE UP (ref 70–99)
GLUCOSE BLDC GLUCOMTR-MCNC: 84 MG/DL — SIGNIFICANT CHANGE UP (ref 70–99)
GLUCOSE SERPL-MCNC: 76 MG/DL — SIGNIFICANT CHANGE UP (ref 70–99)
HCT VFR BLD CALC: 42.7 % — SIGNIFICANT CHANGE UP (ref 39–50)
HGB BLD-MCNC: 13.6 G/DL — SIGNIFICANT CHANGE UP (ref 13–17)
MAGNESIUM SERPL-MCNC: 2.4 MG/DL — SIGNIFICANT CHANGE UP (ref 1.6–2.6)
MCHC RBC-ENTMCNC: 27.9 PG — SIGNIFICANT CHANGE UP (ref 27–34)
MCHC RBC-ENTMCNC: 31.9 GM/DL — LOW (ref 32–36)
MCV RBC AUTO: 87.7 FL — SIGNIFICANT CHANGE UP (ref 80–100)
NRBC # BLD: 0 /100 WBCS — SIGNIFICANT CHANGE UP (ref 0–0)
PHOSPHATE SERPL-MCNC: 3.1 MG/DL — SIGNIFICANT CHANGE UP (ref 2.5–4.5)
PLATELET # BLD AUTO: 229 K/UL — SIGNIFICANT CHANGE UP (ref 150–400)
POTASSIUM SERPL-MCNC: 3.8 MMOL/L — SIGNIFICANT CHANGE UP (ref 3.5–5.3)
POTASSIUM SERPL-SCNC: 3.8 MMOL/L — SIGNIFICANT CHANGE UP (ref 3.5–5.3)
RBC # BLD: 4.87 M/UL — SIGNIFICANT CHANGE UP (ref 4.2–5.8)
RBC # FLD: 15 % — HIGH (ref 10.3–14.5)
SODIUM SERPL-SCNC: 139 MMOL/L — SIGNIFICANT CHANGE UP (ref 135–145)
WBC # BLD: 4.25 K/UL — SIGNIFICANT CHANGE UP (ref 3.8–10.5)
WBC # FLD AUTO: 4.25 K/UL — SIGNIFICANT CHANGE UP (ref 3.8–10.5)

## 2019-06-27 PROCEDURE — 99233 SBSQ HOSP IP/OBS HIGH 50: CPT

## 2019-06-27 RX ORDER — SODIUM CHLORIDE 9 MG/ML
1000 INJECTION INTRAMUSCULAR; INTRAVENOUS; SUBCUTANEOUS ONCE
Refills: 0 | Status: COMPLETED | OUTPATIENT
Start: 2019-06-27 | End: 2019-06-27

## 2019-06-27 RX ORDER — HALOPERIDOL DECANOATE 100 MG/ML
5 INJECTION INTRAMUSCULAR ONCE
Refills: 0 | Status: COMPLETED | OUTPATIENT
Start: 2019-06-27 | End: 2019-06-27

## 2019-06-27 RX ORDER — DIPHENHYDRAMINE HCL 50 MG
25 CAPSULE ORAL ONCE
Refills: 0 | Status: COMPLETED | OUTPATIENT
Start: 2019-06-27 | End: 2019-06-27

## 2019-06-27 RX ORDER — SODIUM CHLORIDE 9 MG/ML
1000 INJECTION, SOLUTION INTRAVENOUS
Refills: 0 | Status: DISCONTINUED | OUTPATIENT
Start: 2019-06-27 | End: 2019-06-30

## 2019-06-27 RX ADMIN — Medication 25 MILLIGRAM(S): at 16:00

## 2019-06-27 RX ADMIN — Medication 1 APPLICATION(S): at 14:22

## 2019-06-27 RX ADMIN — CHLORHEXIDINE GLUCONATE 1 APPLICATION(S): 213 SOLUTION TOPICAL at 06:24

## 2019-06-27 RX ADMIN — Medication 1 APPLICATION(S): at 21:08

## 2019-06-27 RX ADMIN — Medication 195 MILLIGRAM(S): at 18:10

## 2019-06-27 RX ADMIN — HEPARIN SODIUM 5000 UNIT(S): 5000 INJECTION INTRAVENOUS; SUBCUTANEOUS at 14:22

## 2019-06-27 RX ADMIN — Medication 1 APPLICATION(S): at 05:14

## 2019-06-27 RX ADMIN — SODIUM CHLORIDE 1000 MILLILITER(S): 9 INJECTION INTRAMUSCULAR; INTRAVENOUS; SUBCUTANEOUS at 18:35

## 2019-06-27 RX ADMIN — HEPARIN SODIUM 5000 UNIT(S): 5000 INJECTION INTRAVENOUS; SUBCUTANEOUS at 21:08

## 2019-06-27 RX ADMIN — HALOPERIDOL DECANOATE 5 MILLIGRAM(S): 100 INJECTION INTRAMUSCULAR at 16:00

## 2019-06-27 RX ADMIN — SODIUM CHLORIDE 50 MILLILITER(S): 9 INJECTION, SOLUTION INTRAVENOUS at 06:24

## 2019-06-27 RX ADMIN — Medication 195 MILLIGRAM(S): at 05:02

## 2019-06-27 RX ADMIN — HALOPERIDOL DECANOATE 5 MILLIGRAM(S): 100 INJECTION INTRAMUSCULAR at 05:48

## 2019-06-27 RX ADMIN — Medication 195 MILLIGRAM(S): at 12:26

## 2019-06-27 RX ADMIN — PANTOPRAZOLE SODIUM 40 MILLIGRAM(S): 20 TABLET, DELAYED RELEASE ORAL at 05:04

## 2019-06-27 RX ADMIN — PANTOPRAZOLE SODIUM 40 MILLIGRAM(S): 20 TABLET, DELAYED RELEASE ORAL at 18:10

## 2019-06-27 RX ADMIN — HEPARIN SODIUM 5000 UNIT(S): 5000 INJECTION INTRAVENOUS; SUBCUTANEOUS at 05:03

## 2019-06-27 NOTE — PROGRESS NOTE ADULT - SUBJECTIVE AND OBJECTIVE BOX
HPI:  53 yo m with pmh of alcohol abuse and pud presents in ED c/o shakiness, bloody vomit and black "hard" stool the last few days. patient reports he has not drank in 2 days. No fever/chills, No photophobia/eye pain/changes in vision, No ear pain/sore throat/dysphagia, No chest pain/palpitations, no SOB/cough/wheeze/stridor, No abdominal pain, No D, no dysuria/frequency/discharge, No neck/back pain, no rash, no changes in neurological status/function. he was admitted two weeks ago for the same problem and had endoscopy that showed   ESOPHAGUS:  Mild narrowing in UES ( minimal tension necessary ), Superficial ulceration / esophagitis lower 1/2 of esophagus s/p biopsy    STOMACH: mild antral gastritis s/p biopsy    DUODENUM: WNL  and was discharged on a ppi and carfate but continued to drink and only stoppede two days ago (20 Jun 2019 12:26)      24 hr events: No acute events. Received one dose of PRN haldol. Currently sedated and unable to provide history.    ## ROS:  [x ] unable to obtain    ## Vitals  ICU Vital Signs Last 24 Hrs  T(C): 36.5 (27 Jun 2019 07:18), Max: 37.4 (26 Jun 2019 20:00)  T(F): 97.7 (27 Jun 2019 07:18), Max: 99.3 (26 Jun 2019 20:00)  HR: 73 (27 Jun 2019 08:00) (67 - 106)  BP: 100/60 (27 Jun 2019 08:00) (95/68 - 135/89)  BP(mean): 70 (27 Jun 2019 08:00) (48 - 114)  ABP: --  ABP(mean): --  RR: 17 (27 Jun 2019 08:00) (11 - 25)  SpO2: 95% (27 Jun 2019 08:00) (95% - 99%)      ## Physical Exam:  Gen: Adult male sitting in bed, confused, occasionally follows commands  HEENT: NC/AT sclerae anicteric  Resp: No increased WOB, CTAB  CV: S1, S2  Abd: Soft, + BS  Ext: No edema  Neuro: Awake, confused, not following commands, moves all extremities    ## Vent Data      ## Labs:  Chem:  06-27    139  |  107  |  9   ----------------------------<  76  3.8   |  24  |  0.71    Ca    9.2      27 Jun 2019 06:27  Phos  3.1     06-27  Mg     2.4     06-27        CBC:                        13.6   4.25  )-----------( 229      ( 27 Jun 2019 06:27 )             42.7     Coags:          ## Cardiac        ## Blood Gas      #I/Os  I&O's Detail    26 Jun 2019 07:01  -  27 Jun 2019 07:00  --------------------------------------------------------  IN:    Solution: 250 mL  Total IN: 250 mL    OUT:    Voided: 750 mL  Total OUT: 750 mL    Total NET: -500 mL          ## Imaging:    ## Medications:  MEDICATIONS  (STANDING):  BACItracin   Ointment 1 Application(s) Topical three times a day  chlorhexidine 4% Liquid 1 Application(s) Topical <User Schedule>  dextrose 5%. 1000 milliLiter(s) (50 mL/Hr) IV Continuous <Continuous>  heparin  Injectable 5000 Unit(s) SubCutaneous every 8 hours  pantoprazole  Injectable 40 milliGRAM(s) IV Push every 12 hours  PHENobarbital Injectable 195 milliGRAM(s) IV Push every 6 hours  risperiDONE   Tablet 1 milliGRAM(s) Oral two times a day  sucralfate suspension 1 Gram(s) Oral four times a day    MEDICATIONS  (PRN):  acetaminophen   Tablet .. 650 milliGRAM(s) Oral every 6 hours PRN Temp greater or equal to 38C (100.4F), Moderate Pain (4 - 6)  HYDROmorphone  Injectable 0.5 milliGRAM(s) IV Push every 4 hours PRN Severe Pain (7 - 10)  PHENobarbital Injectable 130 milliGRAM(s) IV Push every 15 minutes PRN for CIWA > 12  PHENobarbital Injectable 260 milliGRAM(s) IV Push every 15 minutes PRN ciwa > 20

## 2019-06-27 NOTE — PROGRESS NOTE ADULT - ASSESSMENT
53 y/o M w/alcohol abuse presenting with alcohol withdrawal with DTs.     - Continue phenobarb 195mg q6hrs standing   - Phenobarb 130mg PRN  - Thiamine, folate, MVI  - PPI/Heparin SC

## 2019-06-28 LAB
ANION GAP SERPL CALC-SCNC: 8 MMOL/L — SIGNIFICANT CHANGE UP (ref 5–17)
BUN SERPL-MCNC: 5 MG/DL — LOW (ref 7–23)
CALCIUM SERPL-MCNC: 8.8 MG/DL — SIGNIFICANT CHANGE UP (ref 8.5–10.1)
CHLORIDE SERPL-SCNC: 106 MMOL/L — SIGNIFICANT CHANGE UP (ref 96–108)
CO2 SERPL-SCNC: 25 MMOL/L — SIGNIFICANT CHANGE UP (ref 22–31)
CREAT SERPL-MCNC: 0.76 MG/DL — SIGNIFICANT CHANGE UP (ref 0.5–1.3)
GLUCOSE SERPL-MCNC: 85 MG/DL — SIGNIFICANT CHANGE UP (ref 70–99)
HCT VFR BLD CALC: 40.8 % — SIGNIFICANT CHANGE UP (ref 39–50)
HGB BLD-MCNC: 13.1 G/DL — SIGNIFICANT CHANGE UP (ref 13–17)
MAGNESIUM SERPL-MCNC: 2.1 MG/DL — SIGNIFICANT CHANGE UP (ref 1.6–2.6)
MCHC RBC-ENTMCNC: 28.1 PG — SIGNIFICANT CHANGE UP (ref 27–34)
MCHC RBC-ENTMCNC: 32.1 GM/DL — SIGNIFICANT CHANGE UP (ref 32–36)
MCV RBC AUTO: 87.4 FL — SIGNIFICANT CHANGE UP (ref 80–100)
NRBC # BLD: 0 /100 WBCS — SIGNIFICANT CHANGE UP (ref 0–0)
PHOSPHATE SERPL-MCNC: 2.5 MG/DL — SIGNIFICANT CHANGE UP (ref 2.5–4.5)
PLATELET # BLD AUTO: 247 K/UL — SIGNIFICANT CHANGE UP (ref 150–400)
POTASSIUM SERPL-MCNC: 3.3 MMOL/L — LOW (ref 3.5–5.3)
POTASSIUM SERPL-SCNC: 3.3 MMOL/L — LOW (ref 3.5–5.3)
RBC # BLD: 4.67 M/UL — SIGNIFICANT CHANGE UP (ref 4.2–5.8)
RBC # FLD: 14.9 % — HIGH (ref 10.3–14.5)
SODIUM SERPL-SCNC: 139 MMOL/L — SIGNIFICANT CHANGE UP (ref 135–145)
WBC # BLD: 3.65 K/UL — LOW (ref 3.8–10.5)
WBC # FLD AUTO: 3.65 K/UL — LOW (ref 3.8–10.5)

## 2019-06-28 PROCEDURE — 99233 SBSQ HOSP IP/OBS HIGH 50: CPT

## 2019-06-28 RX ORDER — POTASSIUM CHLORIDE 20 MEQ
10 PACKET (EA) ORAL
Refills: 0 | Status: COMPLETED | OUTPATIENT
Start: 2019-06-28 | End: 2019-06-28

## 2019-06-28 RX ORDER — POTASSIUM CHLORIDE 20 MEQ
40 PACKET (EA) ORAL ONCE
Refills: 0 | Status: COMPLETED | OUTPATIENT
Start: 2019-06-28 | End: 2019-06-28

## 2019-06-28 RX ADMIN — Medication 195 MILLIGRAM(S): at 17:58

## 2019-06-28 RX ADMIN — Medication 1 APPLICATION(S): at 05:51

## 2019-06-28 RX ADMIN — PANTOPRAZOLE SODIUM 40 MILLIGRAM(S): 20 TABLET, DELAYED RELEASE ORAL at 17:56

## 2019-06-28 RX ADMIN — HEPARIN SODIUM 5000 UNIT(S): 5000 INJECTION INTRAVENOUS; SUBCUTANEOUS at 14:30

## 2019-06-28 RX ADMIN — Medication 1 GRAM(S): at 17:57

## 2019-06-28 RX ADMIN — Medication 195 MILLIGRAM(S): at 05:50

## 2019-06-28 RX ADMIN — Medication 130 MILLIGRAM(S): at 00:32

## 2019-06-28 RX ADMIN — Medication 195 MILLIGRAM(S): at 23:42

## 2019-06-28 RX ADMIN — CHLORHEXIDINE GLUCONATE 1 APPLICATION(S): 213 SOLUTION TOPICAL at 08:12

## 2019-06-28 RX ADMIN — Medication 1 GRAM(S): at 05:50

## 2019-06-28 RX ADMIN — HEPARIN SODIUM 5000 UNIT(S): 5000 INJECTION INTRAVENOUS; SUBCUTANEOUS at 22:30

## 2019-06-28 RX ADMIN — Medication 100 MILLIEQUIVALENT(S): at 09:10

## 2019-06-28 RX ADMIN — Medication 1 GRAM(S): at 12:28

## 2019-06-28 RX ADMIN — Medication 1 APPLICATION(S): at 22:31

## 2019-06-28 RX ADMIN — RISPERIDONE 1 MILLIGRAM(S): 4 TABLET ORAL at 05:50

## 2019-06-28 RX ADMIN — Medication 195 MILLIGRAM(S): at 12:29

## 2019-06-28 RX ADMIN — PANTOPRAZOLE SODIUM 40 MILLIGRAM(S): 20 TABLET, DELAYED RELEASE ORAL at 05:50

## 2019-06-28 RX ADMIN — Medication 1 GRAM(S): at 23:44

## 2019-06-28 RX ADMIN — Medication 195 MILLIGRAM(S): at 00:06

## 2019-06-28 RX ADMIN — HEPARIN SODIUM 5000 UNIT(S): 5000 INJECTION INTRAVENOUS; SUBCUTANEOUS at 05:50

## 2019-06-28 RX ADMIN — Medication 1 APPLICATION(S): at 14:32

## 2019-06-28 RX ADMIN — SODIUM CHLORIDE 50 MILLILITER(S): 9 INJECTION, SOLUTION INTRAVENOUS at 21:00

## 2019-06-28 RX ADMIN — Medication 130 MILLIGRAM(S): at 02:21

## 2019-06-28 RX ADMIN — SODIUM CHLORIDE 50 MILLILITER(S): 9 INJECTION, SOLUTION INTRAVENOUS at 03:15

## 2019-06-28 RX ADMIN — RISPERIDONE 1 MILLIGRAM(S): 4 TABLET ORAL at 17:58

## 2019-06-28 RX ADMIN — Medication 100 MILLIEQUIVALENT(S): at 08:12

## 2019-06-28 NOTE — PROGRESS NOTE ADULT - ASSESSMENT
55 y/o M w/alcohol abuse presenting with alcohol withdrawal with DTs.     - Continue phenobarb 195mg q6hrs standing   - Phenobarb 130mg PRN  - Thiamine, folate, MVI  - PPI/Heparin SC

## 2019-06-28 NOTE — PROGRESS NOTE ADULT - SUBJECTIVE AND OBJECTIVE BOX
HPI:  55 yo m with pmh of alcohol abuse and pud presents in ED c/o shakiness, bloody vomit and black "hard" stool the last few days. patient reports he has not drank in 2 days. No fever/chills, No photophobia/eye pain/changes in vision, No ear pain/sore throat/dysphagia, No chest pain/palpitations, no SOB/cough/wheeze/stridor, No abdominal pain, No D, no dysuria/frequency/discharge, No neck/back pain, no rash, no changes in neurological status/function. he was admitted two weeks ago for the same problem and had endoscopy that showed   ESOPHAGUS:  Mild narrowing in UES ( minimal tension necessary ), Superficial ulceration / esophagitis lower 1/2 of esophagus s/p biopsy    STOMACH: mild antral gastritis s/p biopsy    DUODENUM: WNL  and was discharged on a ppi and carfate but continued to drink and only stoppede two days ago (20 Jun 2019 12:26)      24 hr events:    ## ROS:  [ ] unable to obtain  CONSTITUTIONAL: No fever, weight loss, or fatigue  EYES: No eye pain, visual disturbances, or discharge  ENMT:  No difficulty hearing, tinnitus, vertigo; No sinus or throat pain  NECK: No pain or stiffness  RESPIRATORY: No cough, wheezing, chills or hemoptysis; No shortness of breath  CARDIOVASCULAR: No chest pain, palpitations, dizziness, or leg swelling  GASTROINTESTINAL: No abdominal or epigastric pain. No nausea, vomiting, or hematemesis; No diarrhea or constipation. No melena or hematochezia.  GENITOURINARY: No dysuria, frequency, hematuria, or incontinence  NEUROLOGICAL: No headaches, memory loss, loss of strength, numbness, or tremors  SKIN: No itching, burning, rashes, or lesions   LYMPH NODES: No enlarged glands  ENDOCRINE: No heat or cold intolerance; No hair loss  MUSCULOSKELETAL: No joint pain or swelling; No muscle, back, or extremity pain  PSYCHIATRIC: No depression, anxiety, mood swings, or difficulty sleeping  HEME/LYMPH: No easy bruising, or bleeding gums  ALLERGY AND IMMUNOLOGIC: No hives or eczema    ## Vitals  ICU Vital Signs Last 24 Hrs  T(C): 37.8 (28 Jun 2019 07:15), Max: 37.8 (28 Jun 2019 07:15)  T(F): 100 (28 Jun 2019 07:15), Max: 100 (28 Jun 2019 07:15)  HR: 83 (28 Jun 2019 07:00) (60 - 91)  BP: 132/93 (28 Jun 2019 07:00) (94/61 - 147/110)  BP(mean): 102 (28 Jun 2019 07:00) (69 - 120)  ABP: --  ABP(mean): --  RR: 26 (28 Jun 2019 07:00) (13 - 26)  SpO2: 95% (28 Jun 2019 07:00) (91% - 100%)      ## Physical Exam:  Gen:  HEENT:  Resp:  CV:  Abd:  Ext:  Neuro:    ## Vent Data      ## Labs:  Chem:  06-28    139  |  106  |  5<L>  ----------------------------<  85  3.3<L>   |  25  |  0.76    Ca    8.8      28 Jun 2019 04:21  Phos  2.5     06-28  Mg     2.1     06-28        CBC:                        13.1   3.65  )-----------( 247      ( 28 Jun 2019 04:21 )             40.8     Coags:          ## Cardiac        ## Blood Gas      #I/Os  I&O's Detail    27 Jun 2019 07:01  -  28 Jun 2019 07:00  --------------------------------------------------------  IN:    dextrose 5%.: 1150 mL    Sodium Chloride 0.9% IV Bolus: 1000 mL  Total IN: 2150 mL    OUT:    Incontinent per Condom Catheter: 1000 mL    Voided: 100 mL  Total OUT: 1100 mL    Total NET: 1050 mL          ## Imaging:    ## Medications:  MEDICATIONS  (STANDING):  BACItracin   Ointment 1 Application(s) Topical three times a day  chlorhexidine 4% Liquid 1 Application(s) Topical <User Schedule>  dextrose 5%. 1000 milliLiter(s) (50 mL/Hr) IV Continuous <Continuous>  heparin  Injectable 5000 Unit(s) SubCutaneous every 8 hours  pantoprazole  Injectable 40 milliGRAM(s) IV Push every 12 hours  PHENobarbital Injectable 195 milliGRAM(s) IV Push every 6 hours  potassium chloride  10 mEq/100 mL IVPB 10 milliEquivalent(s) IV Intermittent every 1 hour  risperiDONE   Tablet 1 milliGRAM(s) Oral two times a day  sucralfate suspension 1 Gram(s) Oral four times a day    MEDICATIONS  (PRN):  acetaminophen   Tablet .. 650 milliGRAM(s) Oral every 6 hours PRN Temp greater or equal to 38C (100.4F), Moderate Pain (4 - 6)  PHENobarbital Injectable 130 milliGRAM(s) IV Push every 15 minutes PRN for CIWA > 12  PHENobarbital Injectable 260 milliGRAM(s) IV Push every 15 minutes PRN ciwa > 20 HPI:  53 yo m with pmh of alcohol abuse and pud presents in ED c/o shakiness, bloody vomit and black "hard" stool the last few days. patient reports he has not drank in 2 days. No fever/chills, No photophobia/eye pain/changes in vision, No ear pain/sore throat/dysphagia, No chest pain/palpitations, no SOB/cough/wheeze/stridor, No abdominal pain, No D, no dysuria/frequency/discharge, No neck/back pain, no rash, no changes in neurological status/function. he was admitted two weeks ago for the same problem and had endoscopy that showed   ESOPHAGUS:  Mild narrowing in UES ( minimal tension necessary ), Superficial ulceration / esophagitis lower 1/2 of esophagus s/p biopsy    STOMACH: mild antral gastritis s/p biopsy    DUODENUM: WNL  and was discharged on a ppi and carfate but continued to drink and only stoppede two days ago (20 Jun 2019 12:26)      24 hr events: No acute events. Received some PRN doses of phenobarb overnight. Remains confused unable to provide history.    ## ROS:  [x ] unable to obtain    ## Vitals  ICU Vital Signs Last 24 Hrs  T(C): 37.8 (28 Jun 2019 07:15), Max: 37.8 (28 Jun 2019 07:15)  T(F): 100 (28 Jun 2019 07:15), Max: 100 (28 Jun 2019 07:15)  HR: 83 (28 Jun 2019 07:00) (60 - 91)  BP: 132/93 (28 Jun 2019 07:00) (94/61 - 147/110)  BP(mean): 102 (28 Jun 2019 07:00) (69 - 120)  ABP: --  ABP(mean): --  RR: 26 (28 Jun 2019 07:00) (13 - 26)  SpO2: 95% (28 Jun 2019 07:00) (91% - 100%)      ## Physical Exam:  Gen: Adult male sitting in bed, confused, occasionally follows commands  HEENT: NC/AT sclerae anicteric  Resp: No increased WOB, CTAB  CV: S1, S2  Abd: Soft, + BS  Ext: No edema  Neuro: Awake, confused, not following commands, moves all extremities    ## Vent Data      ## Labs:  Chem:  06-28    139  |  106  |  5<L>  ----------------------------<  85  3.3<L>   |  25  |  0.76    Ca    8.8      28 Jun 2019 04:21  Phos  2.5     06-28  Mg     2.1     06-28        CBC:                        13.1   3.65  )-----------( 247      ( 28 Jun 2019 04:21 )             40.8     Coags:          ## Cardiac        ## Blood Gas      #I/Os  I&O's Detail    27 Jun 2019 07:01  -  28 Jun 2019 07:00  --------------------------------------------------------  IN:    dextrose 5%.: 1150 mL    Sodium Chloride 0.9% IV Bolus: 1000 mL  Total IN: 2150 mL    OUT:    Incontinent per Condom Catheter: 1000 mL    Voided: 100 mL  Total OUT: 1100 mL    Total NET: 1050 mL          ## Imaging:    ## Medications:  MEDICATIONS  (STANDING):  BACItracin   Ointment 1 Application(s) Topical three times a day  chlorhexidine 4% Liquid 1 Application(s) Topical <User Schedule>  dextrose 5%. 1000 milliLiter(s) (50 mL/Hr) IV Continuous <Continuous>  heparin  Injectable 5000 Unit(s) SubCutaneous every 8 hours  pantoprazole  Injectable 40 milliGRAM(s) IV Push every 12 hours  PHENobarbital Injectable 195 milliGRAM(s) IV Push every 6 hours  potassium chloride  10 mEq/100 mL IVPB 10 milliEquivalent(s) IV Intermittent every 1 hour  risperiDONE   Tablet 1 milliGRAM(s) Oral two times a day  sucralfate suspension 1 Gram(s) Oral four times a day    MEDICATIONS  (PRN):  acetaminophen   Tablet .. 650 milliGRAM(s) Oral every 6 hours PRN Temp greater or equal to 38C (100.4F), Moderate Pain (4 - 6)  PHENobarbital Injectable 130 milliGRAM(s) IV Push every 15 minutes PRN for CIWA > 12  PHENobarbital Injectable 260 milliGRAM(s) IV Push every 15 minutes PRN ciwa > 20

## 2019-06-29 LAB
ALBUMIN SERPL ELPH-MCNC: 3.2 G/DL — LOW (ref 3.3–5)
ALP SERPL-CCNC: 42 U/L — SIGNIFICANT CHANGE UP (ref 40–120)
ALT FLD-CCNC: 19 U/L — SIGNIFICANT CHANGE UP (ref 12–78)
ANION GAP SERPL CALC-SCNC: 11 MMOL/L — SIGNIFICANT CHANGE UP (ref 5–17)
AST SERPL-CCNC: 13 U/L — LOW (ref 15–37)
BILIRUB SERPL-MCNC: 0.3 MG/DL — SIGNIFICANT CHANGE UP (ref 0.2–1.2)
BUN SERPL-MCNC: 5 MG/DL — LOW (ref 7–23)
CALCIUM SERPL-MCNC: 9 MG/DL — SIGNIFICANT CHANGE UP (ref 8.5–10.1)
CHLORIDE SERPL-SCNC: 105 MMOL/L — SIGNIFICANT CHANGE UP (ref 96–108)
CO2 SERPL-SCNC: 24 MMOL/L — SIGNIFICANT CHANGE UP (ref 22–31)
CREAT SERPL-MCNC: 0.86 MG/DL — SIGNIFICANT CHANGE UP (ref 0.5–1.3)
GLUCOSE SERPL-MCNC: 96 MG/DL — SIGNIFICANT CHANGE UP (ref 70–99)
HCT VFR BLD CALC: 42.4 % — SIGNIFICANT CHANGE UP (ref 39–50)
HGB BLD-MCNC: 13.6 G/DL — SIGNIFICANT CHANGE UP (ref 13–17)
MAGNESIUM SERPL-MCNC: 2.6 MG/DL — SIGNIFICANT CHANGE UP (ref 1.6–2.6)
MCHC RBC-ENTMCNC: 28 PG — SIGNIFICANT CHANGE UP (ref 27–34)
MCHC RBC-ENTMCNC: 32.1 GM/DL — SIGNIFICANT CHANGE UP (ref 32–36)
MCV RBC AUTO: 87.4 FL — SIGNIFICANT CHANGE UP (ref 80–100)
NRBC # BLD: 0 /100 WBCS — SIGNIFICANT CHANGE UP (ref 0–0)
PHOSPHATE SERPL-MCNC: 3.1 MG/DL — SIGNIFICANT CHANGE UP (ref 2.5–4.5)
PLATELET # BLD AUTO: 279 K/UL — SIGNIFICANT CHANGE UP (ref 150–400)
POTASSIUM SERPL-MCNC: 3.3 MMOL/L — LOW (ref 3.5–5.3)
POTASSIUM SERPL-SCNC: 3.3 MMOL/L — LOW (ref 3.5–5.3)
PROT SERPL-MCNC: 8 GM/DL — SIGNIFICANT CHANGE UP (ref 6–8.3)
RBC # BLD: 4.85 M/UL — SIGNIFICANT CHANGE UP (ref 4.2–5.8)
RBC # FLD: 15 % — HIGH (ref 10.3–14.5)
SODIUM SERPL-SCNC: 140 MMOL/L — SIGNIFICANT CHANGE UP (ref 135–145)
WBC # BLD: 3.95 K/UL — SIGNIFICANT CHANGE UP (ref 3.8–10.5)
WBC # FLD AUTO: 3.95 K/UL — SIGNIFICANT CHANGE UP (ref 3.8–10.5)

## 2019-06-29 PROCEDURE — 99233 SBSQ HOSP IP/OBS HIGH 50: CPT

## 2019-06-29 RX ORDER — POTASSIUM CHLORIDE 20 MEQ
10 PACKET (EA) ORAL
Refills: 0 | Status: COMPLETED | OUTPATIENT
Start: 2019-06-29 | End: 2019-06-29

## 2019-06-29 RX ORDER — PHENOBARBITAL 60 MG
260 TABLET ORAL
Refills: 0 | Status: DISCONTINUED | OUTPATIENT
Start: 2019-06-29 | End: 2019-07-05

## 2019-06-29 RX ORDER — BACITRACIN ZINC 500 UNIT/G
1 OINTMENT IN PACKET (EA) TOPICAL
Refills: 0 | Status: DISCONTINUED | OUTPATIENT
Start: 2019-06-29 | End: 2019-07-08

## 2019-06-29 RX ORDER — POTASSIUM CHLORIDE 20 MEQ
40 PACKET (EA) ORAL EVERY 4 HOURS
Refills: 0 | Status: DISCONTINUED | OUTPATIENT
Start: 2019-06-29 | End: 2019-06-29

## 2019-06-29 RX ORDER — PHENOBARBITAL 60 MG
130 TABLET ORAL EVERY 6 HOURS
Refills: 0 | Status: DISCONTINUED | OUTPATIENT
Start: 2019-06-29 | End: 2019-06-30

## 2019-06-29 RX ORDER — PHENOBARBITAL 60 MG
130 TABLET ORAL
Refills: 0 | Status: DISCONTINUED | OUTPATIENT
Start: 2019-06-29 | End: 2019-07-05

## 2019-06-29 RX ADMIN — CHLORHEXIDINE GLUCONATE 1 APPLICATION(S): 213 SOLUTION TOPICAL at 09:15

## 2019-06-29 RX ADMIN — HEPARIN SODIUM 5000 UNIT(S): 5000 INJECTION INTRAVENOUS; SUBCUTANEOUS at 22:16

## 2019-06-29 RX ADMIN — Medication 130 MILLIGRAM(S): at 23:24

## 2019-06-29 RX ADMIN — RISPERIDONE 1 MILLIGRAM(S): 4 TABLET ORAL at 18:35

## 2019-06-29 RX ADMIN — PANTOPRAZOLE SODIUM 40 MILLIGRAM(S): 20 TABLET, DELAYED RELEASE ORAL at 05:26

## 2019-06-29 RX ADMIN — Medication 100 MILLIEQUIVALENT(S): at 09:23

## 2019-06-29 RX ADMIN — Medication 100 MILLIEQUIVALENT(S): at 13:16

## 2019-06-29 RX ADMIN — HEPARIN SODIUM 5000 UNIT(S): 5000 INJECTION INTRAVENOUS; SUBCUTANEOUS at 05:25

## 2019-06-29 RX ADMIN — SODIUM CHLORIDE 50 MILLILITER(S): 9 INJECTION, SOLUTION INTRAVENOUS at 18:36

## 2019-06-29 RX ADMIN — Medication 1 GRAM(S): at 05:26

## 2019-06-29 RX ADMIN — Medication 1 APPLICATION(S): at 22:20

## 2019-06-29 RX ADMIN — Medication 130 MILLIGRAM(S): at 18:35

## 2019-06-29 RX ADMIN — Medication 195 MILLIGRAM(S): at 05:26

## 2019-06-29 RX ADMIN — HEPARIN SODIUM 5000 UNIT(S): 5000 INJECTION INTRAVENOUS; SUBCUTANEOUS at 13:43

## 2019-06-29 RX ADMIN — PANTOPRAZOLE SODIUM 40 MILLIGRAM(S): 20 TABLET, DELAYED RELEASE ORAL at 18:35

## 2019-06-29 RX ADMIN — Medication 130 MILLIGRAM(S): at 13:17

## 2019-06-29 RX ADMIN — Medication 100 MILLIEQUIVALENT(S): at 12:16

## 2019-06-29 RX ADMIN — Medication 1 GRAM(S): at 18:35

## 2019-06-29 RX ADMIN — Medication 1 APPLICATION(S): at 13:35

## 2019-06-29 RX ADMIN — RISPERIDONE 1 MILLIGRAM(S): 4 TABLET ORAL at 05:26

## 2019-06-29 RX ADMIN — Medication 1 APPLICATION(S): at 05:27

## 2019-06-29 RX ADMIN — Medication 1 GRAM(S): at 12:37

## 2019-06-29 NOTE — PROGRESS NOTE ADULT - ASSESSMENT
55 y/o M w/alcohol abuse presenting with alcohol withdrawal with DTs.     - Continue phenobarb 195mg q6hrs standing   - Phenobarb 130mg PRN  - Thiamine, folate, MVI  - PPI/Heparin SC 55 y/o M w/alcohol abuse presenting with alcohol withdrawal with DTs.     - Decrease phenobarb to 130mg q6hrs standing   - Phenobarb 130mg PRN  - Thiamine, folate, MVI  - PPI/Heparin SC

## 2019-06-30 LAB
ANION GAP SERPL CALC-SCNC: 8 MMOL/L — SIGNIFICANT CHANGE UP (ref 5–17)
BUN SERPL-MCNC: 5 MG/DL — LOW (ref 7–23)
CALCIUM SERPL-MCNC: 8.9 MG/DL — SIGNIFICANT CHANGE UP (ref 8.5–10.1)
CHLORIDE SERPL-SCNC: 106 MMOL/L — SIGNIFICANT CHANGE UP (ref 96–108)
CO2 SERPL-SCNC: 24 MMOL/L — SIGNIFICANT CHANGE UP (ref 22–31)
CREAT SERPL-MCNC: 0.79 MG/DL — SIGNIFICANT CHANGE UP (ref 0.5–1.3)
GLUCOSE SERPL-MCNC: 102 MG/DL — HIGH (ref 70–99)
HCT VFR BLD CALC: 41.1 % — SIGNIFICANT CHANGE UP (ref 39–50)
HGB BLD-MCNC: 13.2 G/DL — SIGNIFICANT CHANGE UP (ref 13–17)
MAGNESIUM SERPL-MCNC: 2.5 MG/DL — SIGNIFICANT CHANGE UP (ref 1.6–2.6)
MCHC RBC-ENTMCNC: 27.8 PG — SIGNIFICANT CHANGE UP (ref 27–34)
MCHC RBC-ENTMCNC: 32.1 GM/DL — SIGNIFICANT CHANGE UP (ref 32–36)
MCV RBC AUTO: 86.7 FL — SIGNIFICANT CHANGE UP (ref 80–100)
NRBC # BLD: 0 /100 WBCS — SIGNIFICANT CHANGE UP (ref 0–0)
PHOSPHATE SERPL-MCNC: 3 MG/DL — SIGNIFICANT CHANGE UP (ref 2.5–4.5)
PLATELET # BLD AUTO: 334 K/UL — SIGNIFICANT CHANGE UP (ref 150–400)
POTASSIUM SERPL-MCNC: 3.3 MMOL/L — LOW (ref 3.5–5.3)
POTASSIUM SERPL-SCNC: 3.3 MMOL/L — LOW (ref 3.5–5.3)
RBC # BLD: 4.74 M/UL — SIGNIFICANT CHANGE UP (ref 4.2–5.8)
RBC # FLD: 14.8 % — HIGH (ref 10.3–14.5)
SODIUM SERPL-SCNC: 138 MMOL/L — SIGNIFICANT CHANGE UP (ref 135–145)
WBC # BLD: 5.24 K/UL — SIGNIFICANT CHANGE UP (ref 3.8–10.5)
WBC # FLD AUTO: 5.24 K/UL — SIGNIFICANT CHANGE UP (ref 3.8–10.5)

## 2019-06-30 PROCEDURE — 99233 SBSQ HOSP IP/OBS HIGH 50: CPT

## 2019-06-30 RX ORDER — SODIUM CHLORIDE 9 MG/ML
1000 INJECTION, SOLUTION INTRAVENOUS
Refills: 0 | Status: DISCONTINUED | OUTPATIENT
Start: 2019-06-30 | End: 2019-07-02

## 2019-06-30 RX ORDER — POTASSIUM CHLORIDE 20 MEQ
10 PACKET (EA) ORAL
Refills: 0 | Status: COMPLETED | OUTPATIENT
Start: 2019-06-30 | End: 2019-06-30

## 2019-06-30 RX ORDER — POTASSIUM CHLORIDE 20 MEQ
40 PACKET (EA) ORAL EVERY 4 HOURS
Refills: 0 | Status: DISCONTINUED | OUTPATIENT
Start: 2019-06-30 | End: 2019-06-30

## 2019-06-30 RX ORDER — PHENOBARBITAL 60 MG
65 TABLET ORAL EVERY 6 HOURS
Refills: 0 | Status: DISCONTINUED | OUTPATIENT
Start: 2019-06-30 | End: 2019-07-02

## 2019-06-30 RX ADMIN — Medication 65 MILLIGRAM(S): at 18:19

## 2019-06-30 RX ADMIN — RISPERIDONE 1 MILLIGRAM(S): 4 TABLET ORAL at 18:18

## 2019-06-30 RX ADMIN — Medication 1 GRAM(S): at 05:37

## 2019-06-30 RX ADMIN — HEPARIN SODIUM 5000 UNIT(S): 5000 INJECTION INTRAVENOUS; SUBCUTANEOUS at 21:04

## 2019-06-30 RX ADMIN — Medication 1 APPLICATION(S): at 18:19

## 2019-06-30 RX ADMIN — Medication 1 APPLICATION(S): at 21:05

## 2019-06-30 RX ADMIN — Medication 65 MILLIGRAM(S): at 23:33

## 2019-06-30 RX ADMIN — Medication 100 MILLIEQUIVALENT(S): at 12:33

## 2019-06-30 RX ADMIN — Medication 1 GRAM(S): at 12:45

## 2019-06-30 RX ADMIN — Medication 100 MILLIEQUIVALENT(S): at 14:30

## 2019-06-30 RX ADMIN — RISPERIDONE 1 MILLIGRAM(S): 4 TABLET ORAL at 06:39

## 2019-06-30 RX ADMIN — HEPARIN SODIUM 5000 UNIT(S): 5000 INJECTION INTRAVENOUS; SUBCUTANEOUS at 05:36

## 2019-06-30 RX ADMIN — Medication 1 APPLICATION(S): at 05:37

## 2019-06-30 RX ADMIN — Medication 1 GRAM(S): at 18:18

## 2019-06-30 RX ADMIN — Medication 130 MILLIGRAM(S): at 23:51

## 2019-06-30 RX ADMIN — PANTOPRAZOLE SODIUM 40 MILLIGRAM(S): 20 TABLET, DELAYED RELEASE ORAL at 18:18

## 2019-06-30 RX ADMIN — Medication 40 MILLIEQUIVALENT(S): at 11:34

## 2019-06-30 RX ADMIN — CHLORHEXIDINE GLUCONATE 1 APPLICATION(S): 213 SOLUTION TOPICAL at 06:39

## 2019-06-30 RX ADMIN — SODIUM CHLORIDE 42 MILLILITER(S): 9 INJECTION, SOLUTION INTRAVENOUS at 18:29

## 2019-06-30 RX ADMIN — HEPARIN SODIUM 5000 UNIT(S): 5000 INJECTION INTRAVENOUS; SUBCUTANEOUS at 14:35

## 2019-06-30 RX ADMIN — Medication 1 APPLICATION(S): at 14:33

## 2019-06-30 RX ADMIN — Medication 100 MILLIEQUIVALENT(S): at 10:30

## 2019-06-30 RX ADMIN — Medication 65 MILLIGRAM(S): at 12:45

## 2019-06-30 RX ADMIN — PANTOPRAZOLE SODIUM 40 MILLIGRAM(S): 20 TABLET, DELAYED RELEASE ORAL at 05:36

## 2019-06-30 RX ADMIN — Medication 130 MILLIGRAM(S): at 05:37

## 2019-06-30 RX ADMIN — SODIUM CHLORIDE 50 MILLILITER(S): 9 INJECTION, SOLUTION INTRAVENOUS at 14:36

## 2019-06-30 NOTE — PROGRESS NOTE ADULT - ASSESSMENT
55 y/o M w/alcohol abuse presenting with alcohol withdrawal with DTs.     - Decrease phenobarb to 65mg q6hrs standing   - Phenobarb 130mg PRN  - Thiamine, folate, MVI  - PPI/Heparin SC

## 2019-06-30 NOTE — CHART NOTE - NSCHARTNOTEFT_GEN_A_CORE
Assessment:   Pt adm c alcohol withdrawal, c DTs, on 1:1 Nursing Assistant observation.    Factors impacting intake: [ ] none [ ] nausea  [ ] vomiting [ ] diarrhea [ ] constipation  [ ]chewing problems [ ] swallowing issues  [ x] other: AMS, lethargy     Diet Prescription: Diet, Soft:   No Beef  No Pork  Supplement Feeding Modality:  Oral  Ensure Enlive Cans or Servings Per Day:  2       Frequency:  Daily (06-23-19 @ 15:58)    Intake: overall intake < 50 % nutrition needs x 10 days, taking some of the Ensure Enlive as per Nursing Assistant     Current Weight: 06/29/19, 72.5 kg, 06/22/19, 75 kg, c wt. loss of 2.5 kg  % Weight Change: 3.4%  non pitting generalized edema noted    Nutrition focused physical exam conducted; Subcutaneous fat Exam;  [ Mild  ]  Orbital fat pads region,  [ WNL  ]Buccal fat region,  [ Mild   ]triceps region, [ Moderate   ]ribs region.  Muscle Exam; [ WNL  ]temples region, [ WNL  ]clavicle region, [ WNL  ]shoulder region, [ Unable  ]Scapula region, [  Unable , edema noted ]Interosseous region, [ Mild to Moderate   ]thigh region, [ Mild to Moderate  ]Calf region    Pertinent Medications: MEDICATIONS  (STANDING):  BACItracin   Ointment 1 Application(s) Topical three times a day  BACItracin   Ointment 1 Application(s) Topical two times a day  chlorhexidine 4% Liquid 1 Application(s) Topical <User Schedule>  dextrose 5%. 1000 milliLiter(s) (50 mL/Hr) IV Continuous <Continuous>  heparin  Injectable 5000 Unit(s) SubCutaneous every 8 hours  pantoprazole  Injectable 40 milliGRAM(s) IV Push every 12 hours  PHENobarbital Injectable 65 milliGRAM(s) IV Push every 6 hours  risperiDONE   Tablet 1 milliGRAM(s) Oral two times a day  sucralfate suspension 1 Gram(s) Oral four times a day    MEDICATIONS  (PRN):  acetaminophen   Tablet .. 650 milliGRAM(s) Oral every 6 hours PRN Temp greater or equal to 38C (100.4F), Moderate Pain (4 - 6)  PHENobarbital Injectable 130 milliGRAM(s) IV Push every 15 minutes PRN for CIWA > 12  PHENobarbital Injectable 260 milliGRAM(s) IV Push every 15 minutes PRN ciwa > 20    Pertinent Labs: 06-30 Na138 mmol/L Glu 102 mg/dL<H> K+ 3.3 mmol/L<L> Cr  0.79 mg/dL BUN 5 mg/dL<L> 06-30 Phos 3.0 mg/dL 06-29 Alb 3.2 g/dL<L>     CAPILLARY BLOOD GLUCOSE        Skin: WDL    Estimated Needs:   [ ] no change since previous assessment  [ x] recalculated: 5104-0355 calories(25-30 Kcal/kg IBW), 80- 94 grams protein(1.2-1.4 gram protein /kg IBW), 2167-2010 ml fluid(25-30 ml/kg IBW=67.1 kg)    Previous Nutrition Diagnosis:   · Nutrition Diagnostic Terminology #1: Energy Balance  · Energy Balance: Inadequate energy intake  · Etiology: decreased ability to consume sufficient energy, alcohol dependence/ withdrawal  · Signs/Symptoms: < 50% oral intake x 3 days, 6% wt. loss in 1 year  · Nutrition Intervention: Medical Food Supplements  · Medical and Food Supplements: Commercial beverage; Recommend Ensure Enlive 2 x day=750 calories, 40 grams protein  · Goal/Expected Outcome: pt to consume >75% of meals & supplement; not met    Nutrition Diagnosis is [ x] ongoing & changed to new diagnosis  [ ] resolved [ ] not applicable       New Nutrition Diagnosis:   [x ] Malnutrition: severe malnutrition in context of acute illness      Related to: alcohol withdrawal, DTs    As evidenced by: <50 % nutrition needs x 10 days, 3.4% wt. loss in >1 week(10 days)    Goal: pt to consume >75% of protein- energy needs via oral or enteral feeding     Interventions:   Recommend  [ x] Change Diet To: soft,  Ensure Enlive 3 x day=1125 calories, 60 grams protein   [ ] Nutrition Supplement  [x ] Nutrition Support: if oral intake remains poor, recommend NGT feeding c Jevity 1.2 @ 30 mL/hr increase to goal rate of 60 mL/hr x 24 hrs (1728 lonnie, 83 gm pro, 1166 mL free water)   [ ] Other:     Monitoring and Evaluation:   [ x] PO intake [ x ] Tolerance to diet prescription [ x ] weights [ x ] labs[ x ] follow up per protocol  [ ] other:

## 2019-06-30 NOTE — PROGRESS NOTE ADULT - SUBJECTIVE AND OBJECTIVE BOX
HPI:  53 yo m with pmh of alcohol abuse and pud presents in ED c/o shakiness, bloody vomit and black "hard" stool the last few days. patient reports he has not drank in 2 days. No fever/chills, No photophobia/eye pain/changes in vision, No ear pain/sore throat/dysphagia, No chest pain/palpitations, no SOB/cough/wheeze/stridor, No abdominal pain, No D, no dysuria/frequency/discharge, No neck/back pain, no rash, no changes in neurological status/function. he was admitted two weeks ago for the same problem and had endoscopy that showed   ESOPHAGUS:  Mild narrowing in UES ( minimal tension necessary ), Superficial ulceration / esophagitis lower 1/2 of esophagus s/p biopsy    STOMACH: mild antral gastritis s/p biopsy    DUODENUM: WNL  and was discharged on a ppi and carfate but continued to drink and only stoppede two days ago (20 Jun 2019 12:26)      24 hr events: No acute events. Did not receive any PRN doses. Remains confused.    ## ROS:  [x ] unable to obtain    ## Vitals  ICU Vital Signs Last 24 Hrs  T(C): 35.9 (30 Jun 2019 07:32), Max: 37.4 (30 Jun 2019 05:00)  T(F): 96.6 (30 Jun 2019 07:32), Max: 99.4 (30 Jun 2019 05:00)  HR: 85 (30 Jun 2019 07:00) (69 - 94)  BP: 112/63 (30 Jun 2019 07:00) (93/57 - 140/89)  BP(mean): 73 (30 Jun 2019 07:00) (66 - 100)  ABP: --  ABP(mean): --  RR: 26 (30 Jun 2019 07:00) (15 - 26)  SpO2: 97% (30 Jun 2019 06:00) (93% - 99%)      ## Physical Exam:  Gen: Adult male sitting in bed, confused, occasionally follows commands  HEENT: NC/AT sclerae anicteric  Resp: No increased WOB, CTAB  CV: S1, S2  Abd: Soft, + BS  Ext: No edema  Neuro: Awake, confused, not following commands, moves all extremities    ## Vent Data      ## Labs:  Chem:  06-30    138  |  106  |  5<L>  ----------------------------<  102<H>  3.3<L>   |  24  |  0.79    Ca    8.9      30 Jun 2019 03:38  Phos  3.0     06-30  Mg     2.5     06-30    TPro  8.0  /  Alb  3.2<L>  /  TBili  0.3  /  DBili  x   /  AST  13<L>  /  ALT  19  /  AlkPhos  42  06-29    LIVER FUNCTIONS - ( 29 Jun 2019 04:39 )  Alb: 3.2 g/dL / Pro: 8.0 gm/dL / ALK PHOS: 42 U/L / ALT: 19 U/L / AST: 13 U/L / GGT: x           CBC:                        13.2   5.24  )-----------( 334      ( 30 Jun 2019 03:38 )             41.1     Coags:          ## Cardiac        ## Blood Gas      #I/Os  I&O's Detail    29 Jun 2019 07:01  -  30 Jun 2019 07:00  --------------------------------------------------------  IN:    dextrose 5%.: 900 mL    Oral Fluid: 440 mL    Solution: 300 mL  Total IN: 1640 mL    OUT:    Voided: 400 mL  Total OUT: 400 mL    Total NET: 1240 mL          ## Imaging:    ## Medications:  MEDICATIONS  (STANDING):  BACItracin   Ointment 1 Application(s) Topical three times a day  BACItracin   Ointment 1 Application(s) Topical two times a day  chlorhexidine 4% Liquid 1 Application(s) Topical <User Schedule>  dextrose 5%. 1000 milliLiter(s) (50 mL/Hr) IV Continuous <Continuous>  heparin  Injectable 5000 Unit(s) SubCutaneous every 8 hours  pantoprazole  Injectable 40 milliGRAM(s) IV Push every 12 hours  PHENobarbital Injectable 130 milliGRAM(s) IV Push every 6 hours  potassium chloride   Powder 40 milliEquivalent(s) Oral every 4 hours  risperiDONE   Tablet 1 milliGRAM(s) Oral two times a day  sucralfate suspension 1 Gram(s) Oral four times a day    MEDICATIONS  (PRN):  acetaminophen   Tablet .. 650 milliGRAM(s) Oral every 6 hours PRN Temp greater or equal to 38C (100.4F), Moderate Pain (4 - 6)  PHENobarbital Injectable 130 milliGRAM(s) IV Push every 15 minutes PRN for CIWA > 12  PHENobarbital Injectable 260 milliGRAM(s) IV Push every 15 minutes PRN ciwa > 20

## 2019-06-30 NOTE — CHART NOTE - NSCHARTNOTEFT_GEN_A_CORE
Upon Nutritional Assessment by the Registered Dietitian your patient was determined to meet criteria / has evidence of the following diagnosis/diagnoses:          [ ]  Mild Protein Calorie Malnutrition        [ ]  Moderate Protein Calorie Malnutrition        [ x] Severe Protein Calorie Malnutrition; acute severe        [ ] Unspecified Protein Calorie Malnutrition        [ ] Underweight / BMI <19        [ ] Morbid Obesity / BMI > 40      Findings as based on:  •  Comprehensive nutrition assessment and consultation  •  Calorie counts (nutrient intake analysis)  •  Food acceptance and intake status from observations by staff  •  Follow up  •  Patient education  •  Intervention secondary to interdisciplinary rounds  •   concerns      Treatment:    The following diet has been recommended:  soft diet c  Ensure Enlive 3 x day=1125 calories, 60 grams protein or if oral intake remains poor, recommend NGT feeding c Jevity 1.2 @ 30 mL/hr increase to goal rate of 60 mL/hr x 24 hrs (1728 lonnie, 83 gm pro, 1166 mL free water)       PROVIDER Section:     By signing this assessment you are acknowledging and agree with the diagnosis/diagnoses assigned by the Registered Dietitian    Comments:

## 2019-07-01 LAB
AMMONIA BLD-MCNC: 32 UMOL/L — SIGNIFICANT CHANGE UP (ref 11–32)
ANION GAP SERPL CALC-SCNC: 6 MMOL/L — SIGNIFICANT CHANGE UP (ref 5–17)
BUN SERPL-MCNC: 5 MG/DL — LOW (ref 7–23)
CALCIUM SERPL-MCNC: 9.2 MG/DL — SIGNIFICANT CHANGE UP (ref 8.5–10.1)
CHLORIDE SERPL-SCNC: 109 MMOL/L — HIGH (ref 96–108)
CO2 SERPL-SCNC: 24 MMOL/L — SIGNIFICANT CHANGE UP (ref 22–31)
CREAT SERPL-MCNC: 0.75 MG/DL — SIGNIFICANT CHANGE UP (ref 0.5–1.3)
GLUCOSE SERPL-MCNC: 98 MG/DL — SIGNIFICANT CHANGE UP (ref 70–99)
HCT VFR BLD CALC: 41.6 % — SIGNIFICANT CHANGE UP (ref 39–50)
HGB BLD-MCNC: 13.1 G/DL — SIGNIFICANT CHANGE UP (ref 13–17)
MAGNESIUM SERPL-MCNC: 2.2 MG/DL — SIGNIFICANT CHANGE UP (ref 1.6–2.6)
MCHC RBC-ENTMCNC: 27.9 PG — SIGNIFICANT CHANGE UP (ref 27–34)
MCHC RBC-ENTMCNC: 31.5 GM/DL — LOW (ref 32–36)
MCV RBC AUTO: 88.7 FL — SIGNIFICANT CHANGE UP (ref 80–100)
NRBC # BLD: 0 /100 WBCS — SIGNIFICANT CHANGE UP (ref 0–0)
PHOSPHATE SERPL-MCNC: 3 MG/DL — SIGNIFICANT CHANGE UP (ref 2.5–4.5)
PLATELET # BLD AUTO: 317 K/UL — SIGNIFICANT CHANGE UP (ref 150–400)
POTASSIUM SERPL-MCNC: 3.8 MMOL/L — SIGNIFICANT CHANGE UP (ref 3.5–5.3)
POTASSIUM SERPL-SCNC: 3.8 MMOL/L — SIGNIFICANT CHANGE UP (ref 3.5–5.3)
RBC # BLD: 4.69 M/UL — SIGNIFICANT CHANGE UP (ref 4.2–5.8)
RBC # FLD: 15.1 % — HIGH (ref 10.3–14.5)
SODIUM SERPL-SCNC: 139 MMOL/L — SIGNIFICANT CHANGE UP (ref 135–145)
WBC # BLD: 3.18 K/UL — LOW (ref 3.8–10.5)
WBC # FLD AUTO: 3.18 K/UL — LOW (ref 3.8–10.5)

## 2019-07-01 PROCEDURE — 99291 CRITICAL CARE FIRST HOUR: CPT

## 2019-07-01 RX ORDER — DEXMEDETOMIDINE HYDROCHLORIDE IN 0.9% SODIUM CHLORIDE 4 UG/ML
0.2 INJECTION INTRAVENOUS
Qty: 200 | Refills: 0 | Status: DISCONTINUED | OUTPATIENT
Start: 2019-07-01 | End: 2019-07-01

## 2019-07-01 RX ORDER — POTASSIUM CHLORIDE 20 MEQ
10 PACKET (EA) ORAL ONCE
Refills: 0 | Status: COMPLETED | OUTPATIENT
Start: 2019-07-01 | End: 2019-07-01

## 2019-07-01 RX ORDER — HALOPERIDOL DECANOATE 100 MG/ML
5 INJECTION INTRAMUSCULAR EVERY 4 HOURS
Refills: 0 | Status: DISCONTINUED | OUTPATIENT
Start: 2019-07-01 | End: 2019-07-08

## 2019-07-01 RX ADMIN — DEXMEDETOMIDINE HYDROCHLORIDE IN 0.9% SODIUM CHLORIDE 3.79 MICROGRAM(S)/KG/HR: 4 INJECTION INTRAVENOUS at 06:53

## 2019-07-01 RX ADMIN — Medication 65 MILLIGRAM(S): at 23:29

## 2019-07-01 RX ADMIN — RISPERIDONE 1 MILLIGRAM(S): 4 TABLET ORAL at 06:43

## 2019-07-01 RX ADMIN — DEXMEDETOMIDINE HYDROCHLORIDE IN 0.9% SODIUM CHLORIDE 3.79 MICROGRAM(S)/KG/HR: 4 INJECTION INTRAVENOUS at 00:22

## 2019-07-01 RX ADMIN — Medication 1 GRAM(S): at 13:19

## 2019-07-01 RX ADMIN — Medication 1 APPLICATION(S): at 21:44

## 2019-07-01 RX ADMIN — Medication 65 MILLIGRAM(S): at 06:41

## 2019-07-01 RX ADMIN — HEPARIN SODIUM 5000 UNIT(S): 5000 INJECTION INTRAVENOUS; SUBCUTANEOUS at 21:44

## 2019-07-01 RX ADMIN — Medication 1 APPLICATION(S): at 17:29

## 2019-07-01 RX ADMIN — CHLORHEXIDINE GLUCONATE 1 APPLICATION(S): 213 SOLUTION TOPICAL at 06:47

## 2019-07-01 RX ADMIN — Medication 1 GRAM(S): at 23:29

## 2019-07-01 RX ADMIN — HALOPERIDOL DECANOATE 5 MILLIGRAM(S): 100 INJECTION INTRAMUSCULAR at 22:37

## 2019-07-01 RX ADMIN — Medication 1 APPLICATION(S): at 06:41

## 2019-07-01 RX ADMIN — Medication 1 APPLICATION(S): at 13:32

## 2019-07-01 RX ADMIN — Medication 1 GRAM(S): at 06:42

## 2019-07-01 RX ADMIN — Medication 1 GRAM(S): at 17:29

## 2019-07-01 RX ADMIN — PANTOPRAZOLE SODIUM 40 MILLIGRAM(S): 20 TABLET, DELAYED RELEASE ORAL at 06:42

## 2019-07-01 RX ADMIN — RISPERIDONE 1 MILLIGRAM(S): 4 TABLET ORAL at 17:29

## 2019-07-01 RX ADMIN — Medication 65 MILLIGRAM(S): at 13:19

## 2019-07-01 RX ADMIN — HEPARIN SODIUM 5000 UNIT(S): 5000 INJECTION INTRAVENOUS; SUBCUTANEOUS at 13:33

## 2019-07-01 RX ADMIN — Medication 65 MILLIGRAM(S): at 17:40

## 2019-07-01 RX ADMIN — HEPARIN SODIUM 5000 UNIT(S): 5000 INJECTION INTRAVENOUS; SUBCUTANEOUS at 06:42

## 2019-07-01 RX ADMIN — PANTOPRAZOLE SODIUM 40 MILLIGRAM(S): 20 TABLET, DELAYED RELEASE ORAL at 17:28

## 2019-07-01 RX ADMIN — SODIUM CHLORIDE 42 MILLILITER(S): 9 INJECTION, SOLUTION INTRAVENOUS at 17:35

## 2019-07-01 RX ADMIN — Medication 100 MILLIEQUIVALENT(S): at 06:43

## 2019-07-01 NOTE — CHART NOTE - NSCHARTNOTEFT_GEN_A_CORE
Late entry. Pt agitated, positioning self perpendicular in bed, A&O to self only. Precedex restarted for acute delirium vs continued withdrawal. Ammonia in a.m.

## 2019-07-01 NOTE — PROGRESS NOTE ADULT - SUBJECTIVE AND OBJECTIVE BOX
INTERVAL HPI/OVERNIGHT EVENTS:   HPI:  55 yo m with pmh of alcohol abuse and pud presents in ED c/o shakiness, bloody vomit and black "hard" stool the last few days. patient reports he has not drank in 2 days. No fever/chills, No photophobia/eye pain/changes in vision, No ear pain/sore throat/dysphagia, No chest pain/palpitations, no SOB/cough/wheeze/stridor, No abdominal pain, No D, no dysuria/frequency/discharge, No neck/back pain, no rash, no changes in neurological status/function. he was admitted two weeks ago for the same problem and had endoscopy that showed       NO hematemesis Melena BRBPR back on Precedex overnight         PAST MEDICAL & SURGICAL HISTORY:  EtOH dependence  Mixed hyperlipidemia  PUD (peptic ulcer disease)  No significant past surgical history      REVIEW OF SYSTEMS:           ICU Vital Signs Last 24 Hrs  T(C): 37 (01 Jul 2019 07:06), Max: 37.7 (30 Jun 2019 15:17)  T(F): 98.6 (01 Jul 2019 07:06), Max: 99.9 (30 Jun 2019 15:17)  HR: 59 (01 Jul 2019 09:00) (56 - 94)  BP: 111/72 (01 Jul 2019 09:00) (86/61 - 142/80)  BP(mean): 82 (01 Jul 2019 09:00) (65 - 95)  ABP: --  ABP(mean): --  RR: 13 (01 Jul 2019 09:00) (11 - 20)  SpO2: 100% (01 Jul 2019 09:00) (90% - 100%)          I&O's Detail    30 Jun 2019 07:01  -  01 Jul 2019 07:00  --------------------------------------------------------  IN:    dexmedetomidine Infusion: 41.8 mL    dextrose 5%.: 550 mL    multivitamin/thiamine/folic acid in sodium chloride 0.9%: 588 mL    Oral Fluid: 270 mL    Solution: 300 mL  Total IN: 1749.8 mL    OUT:    Incontinent per Condom Catheter: 400 mL  Total OUT: 400 mL    Total NET: 1349.8 mL      01 Jul 2019 07:01  -  01 Jul 2019 09:17  --------------------------------------------------------  IN:    multivitamin/thiamine/folic acid in sodium chloride 0.9%: 11.5 mL  Total IN: 11.5 mL    OUT:  Total OUT: 0 mL    Total NET: 11.5 mL            CAPILLARY BLOOD GLUCOSE        PHYSICAL EXAM:    GENERAL:  calm on precedex drip, pupils equakl and reactive  HEENt No JVD  Lungs CTA   CVS S1 S2 RRR  ABD NT/ND  EXT No edema        LABS:                        13.1   3.18  )-----------( 317      ( 01 Jul 2019 03:09 )             41.6      07-01    139  |  109<H>  |  5<L>  ----------------------------<  98  3.8   |  24  |  0.75    Ca    9.2      01 Jul 2019 03:09  Phos  3.0     07-01  Mg     2.2     07-01              RADIOLOGY & ADDITIONAL STUDIES:      Assessment and Plan:    CRITICAL CARE TIME SPENT:

## 2019-07-01 NOTE — PROGRESS NOTE ADULT - PROBLEM SELECTOR PLAN 1
C/W precedex for now,  Can give haldol and wean off, then use phenobarb PRN  No futher GIB appreciated

## 2019-07-02 LAB
ANION GAP SERPL CALC-SCNC: 7 MMOL/L — SIGNIFICANT CHANGE UP (ref 5–17)
BUN SERPL-MCNC: 10 MG/DL — SIGNIFICANT CHANGE UP (ref 7–23)
CALCIUM SERPL-MCNC: 8.9 MG/DL — SIGNIFICANT CHANGE UP (ref 8.5–10.1)
CHLORIDE SERPL-SCNC: 107 MMOL/L — SIGNIFICANT CHANGE UP (ref 96–108)
CO2 SERPL-SCNC: 25 MMOL/L — SIGNIFICANT CHANGE UP (ref 22–31)
CREAT SERPL-MCNC: 0.78 MG/DL — SIGNIFICANT CHANGE UP (ref 0.5–1.3)
GLUCOSE SERPL-MCNC: 90 MG/DL — SIGNIFICANT CHANGE UP (ref 70–99)
HCT VFR BLD CALC: 40.6 % — SIGNIFICANT CHANGE UP (ref 39–50)
HGB BLD-MCNC: 13.1 G/DL — SIGNIFICANT CHANGE UP (ref 13–17)
MAGNESIUM SERPL-MCNC: 2.4 MG/DL — SIGNIFICANT CHANGE UP (ref 1.6–2.6)
MCHC RBC-ENTMCNC: 28.4 PG — SIGNIFICANT CHANGE UP (ref 27–34)
MCHC RBC-ENTMCNC: 32.3 GM/DL — SIGNIFICANT CHANGE UP (ref 32–36)
MCV RBC AUTO: 87.9 FL — SIGNIFICANT CHANGE UP (ref 80–100)
NRBC # BLD: 0 /100 WBCS — SIGNIFICANT CHANGE UP (ref 0–0)
PHOSPHATE SERPL-MCNC: 2.7 MG/DL — SIGNIFICANT CHANGE UP (ref 2.5–4.5)
PLATELET # BLD AUTO: 382 K/UL — SIGNIFICANT CHANGE UP (ref 150–400)
POTASSIUM SERPL-MCNC: 4.2 MMOL/L — SIGNIFICANT CHANGE UP (ref 3.5–5.3)
POTASSIUM SERPL-SCNC: 4.2 MMOL/L — SIGNIFICANT CHANGE UP (ref 3.5–5.3)
RBC # BLD: 4.62 M/UL — SIGNIFICANT CHANGE UP (ref 4.2–5.8)
RBC # FLD: 14.8 % — HIGH (ref 10.3–14.5)
SODIUM SERPL-SCNC: 139 MMOL/L — SIGNIFICANT CHANGE UP (ref 135–145)
WBC # BLD: 4.88 K/UL — SIGNIFICANT CHANGE UP (ref 3.8–10.5)
WBC # FLD AUTO: 4.88 K/UL — SIGNIFICANT CHANGE UP (ref 3.8–10.5)

## 2019-07-02 PROCEDURE — 99233 SBSQ HOSP IP/OBS HIGH 50: CPT

## 2019-07-02 RX ADMIN — HEPARIN SODIUM 5000 UNIT(S): 5000 INJECTION INTRAVENOUS; SUBCUTANEOUS at 22:33

## 2019-07-02 RX ADMIN — RISPERIDONE 1 MILLIGRAM(S): 4 TABLET ORAL at 05:46

## 2019-07-02 RX ADMIN — Medication 50 MILLIGRAM(S): at 13:06

## 2019-07-02 RX ADMIN — RISPERIDONE 1 MILLIGRAM(S): 4 TABLET ORAL at 18:38

## 2019-07-02 RX ADMIN — HALOPERIDOL DECANOATE 5 MILLIGRAM(S): 100 INJECTION INTRAMUSCULAR at 01:37

## 2019-07-02 RX ADMIN — HEPARIN SODIUM 5000 UNIT(S): 5000 INJECTION INTRAVENOUS; SUBCUTANEOUS at 05:25

## 2019-07-02 RX ADMIN — Medication 1 GRAM(S): at 18:34

## 2019-07-02 RX ADMIN — CHLORHEXIDINE GLUCONATE 1 APPLICATION(S): 213 SOLUTION TOPICAL at 05:24

## 2019-07-02 RX ADMIN — Medication 1 GRAM(S): at 05:25

## 2019-07-02 RX ADMIN — Medication 1 APPLICATION(S): at 13:07

## 2019-07-02 RX ADMIN — Medication 1 APPLICATION(S): at 22:34

## 2019-07-02 RX ADMIN — PANTOPRAZOLE SODIUM 40 MILLIGRAM(S): 20 TABLET, DELAYED RELEASE ORAL at 18:33

## 2019-07-02 RX ADMIN — Medication 1 APPLICATION(S): at 05:25

## 2019-07-02 RX ADMIN — Medication 50 MILLIGRAM(S): at 18:33

## 2019-07-02 RX ADMIN — PANTOPRAZOLE SODIUM 40 MILLIGRAM(S): 20 TABLET, DELAYED RELEASE ORAL at 05:25

## 2019-07-02 RX ADMIN — HEPARIN SODIUM 5000 UNIT(S): 5000 INJECTION INTRAVENOUS; SUBCUTANEOUS at 13:06

## 2019-07-02 RX ADMIN — Medication 50 MILLIGRAM(S): at 23:00

## 2019-07-02 RX ADMIN — Medication 65 MILLIGRAM(S): at 05:25

## 2019-07-02 RX ADMIN — Medication 1 APPLICATION(S): at 18:36

## 2019-07-02 RX ADMIN — Medication 1 GRAM(S): at 23:00

## 2019-07-02 RX ADMIN — Medication 1 GRAM(S): at 13:07

## 2019-07-02 NOTE — PROGRESS NOTE ADULT - PROBLEM SELECTOR PLAN 1
off precedex, start librium taper, c/w Phenobarbital 65 mg Q 6 H PRN, c/w resperidal  keep in ICU today

## 2019-07-02 NOTE — PROGRESS NOTE ADULT - SUBJECTIVE AND OBJECTIVE BOX
INTERVAL HPI/OVERNIGHT EVENTS:   HPI:  55 yo m with pmh of alcohol abuse and pud presents in ED c/o shakiness, bloody vomit and black "hard" stool the last few days. patient reports he has not drank in 2 days. No fever/chills, No photophobia/eye pain/changes in vision, No ear pain/sore throat/dysphagia, No chest pain/palpitations, no SOB/cough/wheeze/stridor, No abdominal pain, No D, no dysuria/frequency/discharge, No neck/back pain, no rash, no changes in neurological status/function. he was admitted two weeks ago for the same problem and had endoscopy that showed   ESOPHAGUS:  Mild narrowing in UES ( minimal tension necessary ), Superficial ulceration / esophagitis lower 1/2 of esophagus s/p biopsy    STOMACH: mild antral gastritis s/p biopsy    DUODENUM: WNL  and was discharged on a ppi and carfate but continued to drink and only stoppede two days ago (20 Jun 2019 12:26)       off precedex doing well    PAST MEDICAL & SURGICAL HISTORY:  EtOH dependence  Mixed hyperlipidemia  PUD (peptic ulcer disease)  No significant past surgical history         ICU Vital Signs Last 24 Hrs  T(C): 36.9 (02 Jul 2019 07:00), Max: 37.6 (01 Jul 2019 19:40)  T(F): 98.4 (02 Jul 2019 07:00), Max: 99.6 (01 Jul 2019 19:40)  HR: 82 (02 Jul 2019 11:00) (61 - 93)  BP: 100/61 (02 Jul 2019 11:00) (90/70 - 120/58)  BP(mean): 71 (02 Jul 2019 11:00) (64 - 93)  ABP: --  ABP(mean): --  RR: 14 (02 Jul 2019 11:00) (12 - 25)  SpO2: 97% (02 Jul 2019 11:00) (93% - 100%)          I&O's Detail    01 Jul 2019 07:01  -  02 Jul 2019 07:00  --------------------------------------------------------  IN:    dexmedetomidine Infusion: 11.4 mL    multivitamin/thiamine/folic acid in sodium chloride 0.9%: 924 mL    Oral Fluid: 934 mL    Solution: 50 mL  Total IN: 1919.4 mL    OUT:    Voided: 850 mL  Total OUT: 850 mL    Total NET: 1069.4 mL      02 Jul 2019 07:01  -  02 Jul 2019 11:48  --------------------------------------------------------  IN:    multivitamin/thiamine/folic acid in sodium chloride 0.9%: 84 mL  Total IN: 84 mL    OUT:  Total OUT: 0 mL    Total NET: 84 mL            CAPILLARY BLOOD GLUCOSE        PHYSICAL EXAM:    GENERAL: awake alert x 1 NAD  HEENT No JVD  Lungs CTA B/L  CVS S1 S2 RRR  ABD NT/ND  EXT No edema      LABS:                        13.1   4.88  )-----------( 382      ( 02 Jul 2019 03:55 )             40.6      07-02    139  |  107  |  10  ----------------------------<  90  4.2   |  25  |  0.78    Ca    8.9      02 Jul 2019 03:55  Phos  2.7     07-02  Mg     2.4     07-02              RADIOLOGY & ADDITIONAL STUDIES:      Assessment and Plan:    CRITICAL CARE TIME SPENT:

## 2019-07-03 LAB
ANION GAP SERPL CALC-SCNC: 9 MMOL/L — SIGNIFICANT CHANGE UP (ref 5–17)
BUN SERPL-MCNC: 8 MG/DL — SIGNIFICANT CHANGE UP (ref 7–23)
CALCIUM SERPL-MCNC: 9.2 MG/DL — SIGNIFICANT CHANGE UP (ref 8.5–10.1)
CHLORIDE SERPL-SCNC: 104 MMOL/L — SIGNIFICANT CHANGE UP (ref 96–108)
CO2 SERPL-SCNC: 28 MMOL/L — SIGNIFICANT CHANGE UP (ref 22–31)
CREAT SERPL-MCNC: 0.67 MG/DL — SIGNIFICANT CHANGE UP (ref 0.5–1.3)
GLUCOSE SERPL-MCNC: 91 MG/DL — SIGNIFICANT CHANGE UP (ref 70–99)
HCT VFR BLD CALC: 40.6 % — SIGNIFICANT CHANGE UP (ref 39–50)
HGB BLD-MCNC: 12.8 G/DL — LOW (ref 13–17)
MAGNESIUM SERPL-MCNC: 2.6 MG/DL — SIGNIFICANT CHANGE UP (ref 1.6–2.6)
MCHC RBC-ENTMCNC: 27.6 PG — SIGNIFICANT CHANGE UP (ref 27–34)
MCHC RBC-ENTMCNC: 31.5 GM/DL — LOW (ref 32–36)
MCV RBC AUTO: 87.5 FL — SIGNIFICANT CHANGE UP (ref 80–100)
NRBC # BLD: 0 /100 WBCS — SIGNIFICANT CHANGE UP (ref 0–0)
PHOSPHATE SERPL-MCNC: 3.3 MG/DL — SIGNIFICANT CHANGE UP (ref 2.5–4.5)
PLATELET # BLD AUTO: 426 K/UL — HIGH (ref 150–400)
POTASSIUM SERPL-MCNC: 3.4 MMOL/L — LOW (ref 3.5–5.3)
POTASSIUM SERPL-SCNC: 3.4 MMOL/L — LOW (ref 3.5–5.3)
RBC # BLD: 4.64 M/UL — SIGNIFICANT CHANGE UP (ref 4.2–5.8)
RBC # FLD: 14.9 % — HIGH (ref 10.3–14.5)
SODIUM SERPL-SCNC: 141 MMOL/L — SIGNIFICANT CHANGE UP (ref 135–145)
WBC # BLD: 3.39 K/UL — LOW (ref 3.8–10.5)
WBC # FLD AUTO: 3.39 K/UL — LOW (ref 3.8–10.5)

## 2019-07-03 PROCEDURE — 99233 SBSQ HOSP IP/OBS HIGH 50: CPT

## 2019-07-03 PROCEDURE — 71045 X-RAY EXAM CHEST 1 VIEW: CPT | Mod: 26

## 2019-07-03 RX ORDER — LACTULOSE 10 G/15ML
10 SOLUTION ORAL ONCE
Refills: 0 | Status: COMPLETED | OUTPATIENT
Start: 2019-07-03 | End: 2019-07-03

## 2019-07-03 RX ORDER — POTASSIUM CHLORIDE 20 MEQ
10 PACKET (EA) ORAL
Refills: 0 | Status: COMPLETED | OUTPATIENT
Start: 2019-07-03 | End: 2019-07-03

## 2019-07-03 RX ORDER — PANTOPRAZOLE SODIUM 20 MG/1
40 TABLET, DELAYED RELEASE ORAL DAILY
Refills: 0 | Status: DISCONTINUED | OUTPATIENT
Start: 2019-07-03 | End: 2019-07-08

## 2019-07-03 RX ADMIN — Medication 1 APPLICATION(S): at 06:26

## 2019-07-03 RX ADMIN — Medication 50 MILLIGRAM(S): at 22:49

## 2019-07-03 RX ADMIN — Medication 1 APPLICATION(S): at 15:23

## 2019-07-03 RX ADMIN — Medication 1 APPLICATION(S): at 22:48

## 2019-07-03 RX ADMIN — HEPARIN SODIUM 5000 UNIT(S): 5000 INJECTION INTRAVENOUS; SUBCUTANEOUS at 22:49

## 2019-07-03 RX ADMIN — HEPARIN SODIUM 5000 UNIT(S): 5000 INJECTION INTRAVENOUS; SUBCUTANEOUS at 14:12

## 2019-07-03 RX ADMIN — HALOPERIDOL DECANOATE 5 MILLIGRAM(S): 100 INJECTION INTRAMUSCULAR at 00:25

## 2019-07-03 RX ADMIN — Medication 1 GRAM(S): at 17:08

## 2019-07-03 RX ADMIN — Medication 100 MILLIEQUIVALENT(S): at 08:09

## 2019-07-03 RX ADMIN — RISPERIDONE 1 MILLIGRAM(S): 4 TABLET ORAL at 17:10

## 2019-07-03 RX ADMIN — CHLORHEXIDINE GLUCONATE 1 APPLICATION(S): 213 SOLUTION TOPICAL at 06:27

## 2019-07-03 RX ADMIN — Medication 1 APPLICATION(S): at 17:08

## 2019-07-03 RX ADMIN — Medication 50 MILLIGRAM(S): at 17:05

## 2019-07-03 RX ADMIN — Medication 100 MILLIEQUIVALENT(S): at 10:08

## 2019-07-03 RX ADMIN — LACTULOSE 10 GRAM(S): 10 SOLUTION ORAL at 17:09

## 2019-07-03 RX ADMIN — HEPARIN SODIUM 5000 UNIT(S): 5000 INJECTION INTRAVENOUS; SUBCUTANEOUS at 06:25

## 2019-07-03 RX ADMIN — PANTOPRAZOLE SODIUM 40 MILLIGRAM(S): 20 TABLET, DELAYED RELEASE ORAL at 06:26

## 2019-07-03 RX ADMIN — PANTOPRAZOLE SODIUM 40 MILLIGRAM(S): 20 TABLET, DELAYED RELEASE ORAL at 17:07

## 2019-07-03 RX ADMIN — Medication 50 MILLIGRAM(S): at 06:26

## 2019-07-03 RX ADMIN — RISPERIDONE 1 MILLIGRAM(S): 4 TABLET ORAL at 06:26

## 2019-07-03 RX ADMIN — Medication 100 MILLIEQUIVALENT(S): at 09:14

## 2019-07-03 NOTE — CHART NOTE - NSCHARTNOTEFT_GEN_A_CORE
Dx: ETOH w/dt’s  Patient is a 54 year old of male with pmhx of alcohol abuse, p/w vomiting admitted to medicine floor for gastritis, code gray with CIWA of 21 after 3x 260mg of phenobarbital transfer to critical care for severe agitation with alcohol withdrawal.   6/21-transfer to critical care 6/22- still needed phenobarb for breakthrough 6/27 breakthrough x2 haldol Benadryl   6/30 Precedex restarted for acute delirium  7/3: NPO.NGT, taper librium    D/W with   Plan to continue librium taper  speech and swallow eval

## 2019-07-03 NOTE — PROGRESS NOTE ADULT - SUBJECTIVE AND OBJECTIVE BOX
INTERVAL HPI/OVERNIGHT EVENTS:   HPI:  53 yo m with pmh of alcohol abuse and pud presents in ED c/o shakiness, bloody vomit and black "hard" stool the last few days. patient reports he has not drank in 2 days. No fever/chills, No photophobia/eye pain/changes in vision, No ear pain/sore throat/dysphagia, No chest pain/palpitations, no SOB/cough/wheeze/stridor, No abdominal pain, No D, no dysuria/frequency/discharge, No neck/back pain, no rash, no changes in neurological status/function. he was admitted two weeks ago for the same problem and had endoscopy that showed   ESOPHAGUS:  Mild narrowing in UES ( minimal tension necessary ), Superficial ulceration / esophagitis lower 1/2 of esophagus s/p biopsy     Doing well with librium only needed one haldol PRN last night  PER RN he is coughing with feeds ( we made NPO except meds)           PAST MEDICAL & SURGICAL HISTORY:  EtOH dependence  Mixed hyperlipidemia  PUD (peptic ulcer disease)  No significant past surgical history           ICU Vital Signs Last 24 Hrs  T(C): 36.1 (03 Jul 2019 07:08), Max: 37 (02 Jul 2019 15:00)  T(F): 97 (03 Jul 2019 07:08), Max: 98.6 (02 Jul 2019 15:00)  HR: 95 (03 Jul 2019 10:00) (62 - 101)  BP: 127/81 (03 Jul 2019 10:00) (95/62 - 132/85)  BP(mean): 92 (03 Jul 2019 10:00) (67 - 97)  ABP: --  ABP(mean): --  RR: 20 (03 Jul 2019 10:00) (12 - 25)  SpO2: 96% (03 Jul 2019 10:00) (95% - 99%)          I&O's Detail    02 Jul 2019 07:01  -  03 Jul 2019 07:00  --------------------------------------------------------  IN:    multivitamin/thiamine/folic acid in sodium chloride 0.9%: 126 mL    Oral Fluid: 1420 mL  Total IN: 1546 mL    OUT:    Voided: 1400 mL  Total OUT: 1400 mL    Total NET: 146 mL      03 Jul 2019 07:01  -  03 Jul 2019 11:05  --------------------------------------------------------  IN:    Solution: 300 mL  Total IN: 300 mL    OUT:    Voided: 400 mL  Total OUT: 400 mL    Total NET: -100 mL            CAPILLARY BLOOD GLUCOSE        PHYSICAL EXAM:    GENERAL Awake alert NAD  HEENT No JVD  Lungs CTA B/L  CVS S1 S2 RRR  ABD NT/ND      LABS:                        12.8   3.39  )-----------( 426      ( 03 Jul 2019 04:13 )             40.6      07-03    141  |  104  |  8   ----------------------------<  91  3.4<L>   |  28  |  0.67    Ca    9.2      03 Jul 2019 04:13  Phos  3.3     07-03  Mg     2.6     07-03              RADIOLOGY & ADDITIONAL STUDIES:      Assessment and Plan:    CRITICAL CARE TIME SPENT:

## 2019-07-03 NOTE — PROGRESS NOTE ADULT - PROBLEM SELECTOR PLAN 1
taper librium - 50 Q 8 , c/w phenobarb and haldol PRN  place NGT for feeds, speech dn swallow consult  can go to floors

## 2019-07-04 LAB
ANION GAP SERPL CALC-SCNC: 6 MMOL/L — SIGNIFICANT CHANGE UP (ref 5–17)
BUN SERPL-MCNC: 10 MG/DL — SIGNIFICANT CHANGE UP (ref 7–23)
CALCIUM SERPL-MCNC: 9.4 MG/DL — SIGNIFICANT CHANGE UP (ref 8.5–10.1)
CHLORIDE SERPL-SCNC: 107 MMOL/L — SIGNIFICANT CHANGE UP (ref 96–108)
CO2 SERPL-SCNC: 25 MMOL/L — SIGNIFICANT CHANGE UP (ref 22–31)
CREAT SERPL-MCNC: 0.84 MG/DL — SIGNIFICANT CHANGE UP (ref 0.5–1.3)
GLUCOSE SERPL-MCNC: 95 MG/DL — SIGNIFICANT CHANGE UP (ref 70–99)
HCT VFR BLD CALC: 43.4 % — SIGNIFICANT CHANGE UP (ref 39–50)
HGB BLD-MCNC: 13.9 G/DL — SIGNIFICANT CHANGE UP (ref 13–17)
MAGNESIUM SERPL-MCNC: 2.4 MG/DL — SIGNIFICANT CHANGE UP (ref 1.6–2.6)
MCHC RBC-ENTMCNC: 28.1 PG — SIGNIFICANT CHANGE UP (ref 27–34)
MCHC RBC-ENTMCNC: 32 GM/DL — SIGNIFICANT CHANGE UP (ref 32–36)
MCV RBC AUTO: 87.9 FL — SIGNIFICANT CHANGE UP (ref 80–100)
NRBC # BLD: 0 /100 WBCS — SIGNIFICANT CHANGE UP (ref 0–0)
PHOSPHATE SERPL-MCNC: 3.4 MG/DL — SIGNIFICANT CHANGE UP (ref 2.5–4.5)
PLATELET # BLD AUTO: 462 K/UL — HIGH (ref 150–400)
POTASSIUM SERPL-MCNC: 3.9 MMOL/L — SIGNIFICANT CHANGE UP (ref 3.5–5.3)
POTASSIUM SERPL-SCNC: 3.9 MMOL/L — SIGNIFICANT CHANGE UP (ref 3.5–5.3)
RBC # BLD: 4.94 M/UL — SIGNIFICANT CHANGE UP (ref 4.2–5.8)
RBC # FLD: 15.1 % — HIGH (ref 10.3–14.5)
SODIUM SERPL-SCNC: 138 MMOL/L — SIGNIFICANT CHANGE UP (ref 135–145)
WBC # BLD: 5.98 K/UL — SIGNIFICANT CHANGE UP (ref 3.8–10.5)
WBC # FLD AUTO: 5.98 K/UL — SIGNIFICANT CHANGE UP (ref 3.8–10.5)

## 2019-07-04 PROCEDURE — 99233 SBSQ HOSP IP/OBS HIGH 50: CPT

## 2019-07-04 RX ADMIN — Medication 1 GRAM(S): at 06:13

## 2019-07-04 RX ADMIN — HEPARIN SODIUM 5000 UNIT(S): 5000 INJECTION INTRAVENOUS; SUBCUTANEOUS at 06:13

## 2019-07-04 RX ADMIN — Medication 1 GRAM(S): at 00:45

## 2019-07-04 RX ADMIN — Medication 1 APPLICATION(S): at 22:31

## 2019-07-04 RX ADMIN — CHLORHEXIDINE GLUCONATE 1 APPLICATION(S): 213 SOLUTION TOPICAL at 05:00

## 2019-07-04 RX ADMIN — Medication 25 MILLIGRAM(S): at 22:17

## 2019-07-04 RX ADMIN — Medication 1 APPLICATION(S): at 13:50

## 2019-07-04 RX ADMIN — RISPERIDONE 1 MILLIGRAM(S): 4 TABLET ORAL at 06:20

## 2019-07-04 RX ADMIN — Medication 1 APPLICATION(S): at 06:13

## 2019-07-04 RX ADMIN — RISPERIDONE 1 MILLIGRAM(S): 4 TABLET ORAL at 18:08

## 2019-07-04 RX ADMIN — Medication 25 MILLIGRAM(S): at 13:50

## 2019-07-04 RX ADMIN — Medication 1 GRAM(S): at 12:20

## 2019-07-04 RX ADMIN — Medication 50 MILLIGRAM(S): at 06:12

## 2019-07-04 RX ADMIN — HALOPERIDOL DECANOATE 5 MILLIGRAM(S): 100 INJECTION INTRAMUSCULAR at 10:59

## 2019-07-04 RX ADMIN — HEPARIN SODIUM 5000 UNIT(S): 5000 INJECTION INTRAVENOUS; SUBCUTANEOUS at 13:50

## 2019-07-04 RX ADMIN — Medication 1 GRAM(S): at 18:08

## 2019-07-04 RX ADMIN — PANTOPRAZOLE SODIUM 40 MILLIGRAM(S): 20 TABLET, DELAYED RELEASE ORAL at 12:15

## 2019-07-04 RX ADMIN — Medication 1 APPLICATION(S): at 18:10

## 2019-07-04 RX ADMIN — HEPARIN SODIUM 5000 UNIT(S): 5000 INJECTION INTRAVENOUS; SUBCUTANEOUS at 22:01

## 2019-07-04 NOTE — PROGRESS NOTE ADULT - ASSESSMENT
53 yo male PMH alcohol abuse and PUD, presents in ED c/o shakiness, bloody vomit and black "hard" stool the last few days. patient reports he has not drank in 2 days. No fever/chills, No photophobia/eye pain/changes in vision, No ear pain/sore throat/dysphagia, No chest pain/palpitations, no SOB/cough/wheeze/stridor, No abdominal pain, No D, no dysuria/frequency/discharge, No neck/back pain, no rash, no changes in neurological status/function. he was admitted two weeks ago for the same problem and had endoscopy that showed  ESOPHAGUS:  Mild narrowing in UES ( minimal tension necessary ), Superficial ulceration / esophagitis lower 1/2 of esophagus s/p biopsy, stomach mild antral gastritis s/p biopsy. DUODENUM: WNL. Was discharged that admission on ppi and carfate but continued to drink and only stoppede two days ago.

## 2019-07-04 NOTE — PROGRESS NOTE ADULT - SUBJECTIVE AND OBJECTIVE BOX
INTERVAL HPI/OVERNIGHT EVENTS:  Pt seen and examined at bedside.     Allergies/Intolerance: No Known Allergies      MEDICATIONS  (STANDING):  BACItracin   Ointment 1 Application(s) Topical three times a day  BACItracin   Ointment 1 Application(s) Topical two times a day  chlordiazePOXIDE 25 milliGRAM(s) Oral every 8 hours  chlorhexidine 4% Liquid 1 Application(s) Topical <User Schedule>  heparin  Injectable 5000 Unit(s) SubCutaneous every 8 hours  pantoprazole   Suspension 40 milliGRAM(s) Enteral Tube daily  risperiDONE   Tablet 1 milliGRAM(s) Oral two times a day  sucralfate suspension 1 Gram(s) Oral four times a day    MEDICATIONS  (PRN):  acetaminophen   Tablet .. 650 milliGRAM(s) Oral every 6 hours PRN Temp greater or equal to 38C (100.4F), Moderate Pain (4 - 6)  haloperidol    Injectable 5 milliGRAM(s) IV Push every 4 hours PRN agitation  PHENobarbital Injectable 130 milliGRAM(s) IV Push every 15 minutes PRN for CIWA > 12  PHENobarbital Injectable 260 milliGRAM(s) IV Push every 15 minutes PRN ciwa > 20        ROS: all systems reviewed and wnl      PHYSICAL EXAMINATION:  Vital Signs Last 24 Hrs  T(C): 36.7 (04 Jul 2019 15:36), Max: 37.3 (04 Jul 2019 11:22)  T(F): 98 (04 Jul 2019 15:36), Max: 99.1 (04 Jul 2019 11:22)  HR: 76 (04 Jul 2019 16:00) (76 - 108)  BP: 100/65 (04 Jul 2019 16:00) (90/63 - 117/65)  BP(mean): 73 (04 Jul 2019 16:00) (69 - 85)  RR: 17 (04 Jul 2019 16:00) (14 - 26)  SpO2: 95% (04 Jul 2019 16:00) (90% - 100%)  CAPILLARY BLOOD GLUCOSE          07-03 @ 07:01  -  07-04 @ 07:00  --------------------------------------------------------  IN: 440 mL / OUT: 400 mL / NET: 40 mL    07-04 @ 07:01  -  07-04 @ 17:19  --------------------------------------------------------  IN: 240 mL / OUT: 0 mL / NET: 240 mL        GENERAL:   NECK: supple, No JVD  CHEST/LUNG: clear to auscultation bilaterally; no rales, rhonchi, or wheezing b/l  HEART: normal S1, S2  ABDOMEN: BS+, soft, ND, NT   EXTREMITIES:  pulses palpable; no clubbing, cyanosis, or edema b/l LEs  SKIN: no rashes or lesions      LABS:                        13.9   5.98  )-----------( 462      ( 04 Jul 2019 03:47 )             43.4     07-04    138  |  107  |  10  ----------------------------<  95  3.9   |  25  |  0.84    Ca    9.4      04 Jul 2019 03:47  Phos  3.4     07-04  Mg     2.4     07-04 INTERVAL HPI/OVERNIGHT EVENTS:  Pt seen and examined at bedside.     Allergies/Intolerance: No Known Allergies      MEDICATIONS  (STANDING):  BACItracin   Ointment 1 Application(s) Topical three times a day  BACItracin   Ointment 1 Application(s) Topical two times a day  chlordiazePOXIDE 25 milliGRAM(s) Oral every 8 hours  chlorhexidine 4% Liquid 1 Application(s) Topical <User Schedule>  heparin  Injectable 5000 Unit(s) SubCutaneous every 8 hours  pantoprazole   Suspension 40 milliGRAM(s) Enteral Tube daily  risperiDONE   Tablet 1 milliGRAM(s) Oral two times a day  sucralfate suspension 1 Gram(s) Oral four times a day    MEDICATIONS  (PRN):  acetaminophen   Tablet .. 650 milliGRAM(s) Oral every 6 hours PRN Temp greater or equal to 38C (100.4F), Moderate Pain (4 - 6)  haloperidol    Injectable 5 milliGRAM(s) IV Push every 4 hours PRN agitation  PHENobarbital Injectable 130 milliGRAM(s) IV Push every 15 minutes PRN for CIWA > 12  PHENobarbital Injectable 260 milliGRAM(s) IV Push every 15 minutes PRN ciwa > 20        ROS: all systems reviewed and wnl      PHYSICAL EXAMINATION:  Vital Signs Last 24 Hrs  T(C): 36.7 (04 Jul 2019 15:36), Max: 37.3 (04 Jul 2019 11:22)  T(F): 98 (04 Jul 2019 15:36), Max: 99.1 (04 Jul 2019 11:22)  HR: 76 (04 Jul 2019 16:00) (76 - 108)  BP: 100/65 (04 Jul 2019 16:00) (90/63 - 117/65)  BP(mean): 73 (04 Jul 2019 16:00) (69 - 85)  RR: 17 (04 Jul 2019 16:00) (14 - 26)  SpO2: 95% (04 Jul 2019 16:00) (90% - 100%)  CAPILLARY BLOOD GLUCOSE          07-03 @ 07:01  -  07-04 @ 07:00  --------------------------------------------------------  IN: 440 mL / OUT: 400 mL / NET: 40 mL    07-04 @ 07:01  -  07-04 @ 17:19  --------------------------------------------------------  IN: 240 mL / OUT: 0 mL / NET: 240 mL        GENERAL: stable in CCU, NGT in place, no fevers, agitation controlled.    NECK: supple, No JVD  CHEST/LUNG: clear to auscultation bilaterally; no rales, rhonchi, or wheezing b/l  HEART: normal S1, S2  ABDOMEN: BS+, soft, ND, NT   EXTREMITIES:  pulses palpable; no clubbing, cyanosis, or edema b/l LEs  SKIN: no rashes or lesions      LABS:                        13.9   5.98  )-----------( 462      ( 04 Jul 2019 03:47 )             43.4     07-04    138  |  107  |  10  ----------------------------<  95  3.9   |  25  |  0.84    Ca    9.4      04 Jul 2019 03:47  Phos  3.4     07-04  Mg     2.4     07-04

## 2019-07-04 NOTE — PROGRESS NOTE ADULT - PROBLEM SELECTOR PLAN 1
taper librium - 50 Q 8 , c/w phenobarb and haldol PRN  place NGT for feeds, speech dn swallow consult  can go to floors. Risperdal 1 mg bid. Taper librium per [facility] protocol. IVP phenobarb PRN and haldol PRN  place NGT for feeds, speech dn swallow consult. can go to floors. Risperdal 1 mg bid. No routine labs needed.

## 2019-07-04 NOTE — CHART NOTE - NSCHARTNOTEFT_GEN_A_CORE
Assessment: Pt seen for follow-up     Factors impacting intake: [ ] none [ ] nausea  [ ] vomiting [ ] diarrhea [ ] constipation  [ ]chewing problems [ ] swallowing issues  [ ] other:     Diet Prescription: Diet, NPO with Tube Feed:   Tube Feeding Modality: Nasogastric  Jevity 1.2 Lester  Total Volume for 24 Hours (mL): 960  Continuous  Starting Tube Feed Rate {mL per Hour}: 10  Increase Tube Feed Rate by (mL): 10     Every 8 hours  Until Goal Tube Feed Rate (mL per Hour): 40  Tube Feed Duration (in Hours): 24  Tube Feed Start Time: 20:00 (07-03-19 @ 20:13)    Intake:     Current Weight: Weight (kg): 74.4 (07-03 @ 06:00)  % Weight Change    Physical appearance:     Pertinent Medications: MEDICATIONS  (STANDING):  BACItracin   Ointment 1 Application(s) Topical three times a day  BACItracin   Ointment 1 Application(s) Topical two times a day  chlordiazePOXIDE 25 milliGRAM(s) Oral every 8 hours  chlorhexidine 4% Liquid 1 Application(s) Topical <User Schedule>  heparin  Injectable 5000 Unit(s) SubCutaneous every 8 hours  pantoprazole   Suspension 40 milliGRAM(s) Enteral Tube daily  risperiDONE   Tablet 1 milliGRAM(s) Oral two times a day  sucralfate suspension 1 Gram(s) Oral four times a day    MEDICATIONS  (PRN):  acetaminophen   Tablet .. 650 milliGRAM(s) Oral every 6 hours PRN Temp greater or equal to 38C (100.4F), Moderate Pain (4 - 6)  haloperidol    Injectable 5 milliGRAM(s) IV Push every 4 hours PRN agitation  PHENobarbital Injectable 130 milliGRAM(s) IV Push every 15 minutes PRN for CIWA > 12  PHENobarbital Injectable 260 milliGRAM(s) IV Push every 15 minutes PRN ciwa > 20    Pertinent Labs: 07-04 Na 138 mmol/L Glu 95 mg/dL K+ 3.9 mmol/L Cr 0.84 mg/dL BUN 10 mg/dL Phos 3.4 mg/dL Alb n/a   PAB n/a   Hgb 13.9 g/dL Hct 43.4 % HgA1C n/a    Glucose, Serum: 95 mg/dL   24Hr FS:  Skin: intact    Estimated Needs:   [x ] no change since previous assessment (6/30/19)  [ ] recalculated:     Previous Nutrition Diagnosis: · Nutrition Diagnostic Terminology #1: Energy Balance  · Energy Balance: Inadequate energy intake  · Etiology: decreased ability to consume sufficient energy, alcohol dependence/ withdrawal  · Signs/Symptoms: < 50% oral intake x 3 days, 6% wt. loss in 1 year  · Nutrition Intervention: Medical Food Supplements  · Medical and Food Supplements: Commercial beverage; Recommend Ensure Enlive 2 x day=750 calories, 40 grams protein  · Goal/Expected Outcome: pt to consume >75% of meals & supplement; N/a    Nutrition Diagnosis is [ x] ongoing & changed to new diagnosis  [ ] resolved [ ] not applicable       New Nutrition Diagnosis:   [x ] Malnutrition: severe malnutrition in context of acute illness    Related to: alcohol withdrawal, DTs  As evidenced by: <50 % nutrition needs x 10 days, 3.4% wt. loss in >1 week(10 days)    Goal: pt to consume >75% of protein- energy needs via oral or enteral feeding       Nutrition Diagnosis is [x ] ongoing  [ ] resolved  [ ] improved  [ ] not applicable       New Nutrition Diagnosis: [x ] not applicable       Interventions:   Recommend  [ ] Continue:  [ ] Change Diet To:  [ ] Nutrition Supplement:  [ ] Nutrition Support:  [ ] Other:     Monitoring and Evaluation:   [ ] PO intake [ x ] Tolerance to diet prescription [ x ] weights [ x ] labs[ x ] follow up per protocol  [ ] other: Assessment: Pt seen for follow-up.  Pt with ETOHA & withdrawal on Librium protocol unable to tolerate po feeding. Pt remains lethargic with increased secretions and remains at risk for aspiration    Factors impacting intake: [ ] none [ ] nausea  [ ] vomiting [ ] diarrhea [ ] constipation  [ ]chewing problems [ ] swallowing issues  [x ] other: lethargic & increased secretions    Diet Prescription: Diet, NPO with Tube Feed:   Tube Feeding Modality: Nasogastric  Jevity 1.2 Lester  Total Volume for 24 Hours (mL): 960  Continuous  Starting Tube Feed Rate {mL per Hour}: 10  Increase Tube Feed Rate by (mL): 10     Every 8 hours  Until Goal Tube Feed Rate (mL per Hour): 40  Tube Feed Duration (in Hours): 24  Tube Feed Start Time: 20:00 (07-03-19 @ 20:13)    Intake:  NGT feeding provides 960ml, 1152kcal & 53gm protein, TF tolerated well. Residuals=0. Pt received total volume 168ml x 24 hours=201kcal & 9gm protein. Pt received 12% protein needs & 12% energy needs x 24hours    Current Weight: Weight (kg): Wt=73.5kg(7/4/19), Wt=73.4kg(6/28), Wt=75kg(6/22)   % Weight Change  wt stable x 6 days & 1.5kg wt loss(2%) x 12 days.     Physical appearance: +1 edema Valentin hands    Pertinent Medications: MEDICATIONS  (STANDING):  BACItracin   Ointment 1 Application(s) Topical three times a day  BACItracin   Ointment 1 Application(s) Topical two times a day  chlordiazePOXIDE 25 milliGRAM(s) Oral every 8 hours  chlorhexidine 4% Liquid 1 Application(s) Topical <User Schedule>  heparin  Injectable 5000 Unit(s) SubCutaneous every 8 hours  pantoprazole   Suspension 40 milliGRAM(s) Enteral Tube daily  risperiDONE   Tablet 1 milliGRAM(s) Oral two times a day  sucralfate suspension 1 Gram(s) Oral four times a day    MEDICATIONS  (PRN):  acetaminophen   Tablet .. 650 milliGRAM(s) Oral every 6 hours PRN Temp greater or equal to 38C (100.4F), Moderate Pain (4 - 6)  haloperidol    Injectable 5 milliGRAM(s) IV Push every 4 hours PRN agitation  PHENobarbital Injectable 130 milliGRAM(s) IV Push every 15 minutes PRN for CIWA > 12  PHENobarbital Injectable 260 milliGRAM(s) IV Push every 15 minutes PRN ciwa > 20    Pertinent Labs: 07-04 Na 138 mmol/L Glu 95 mg/dL K+ 3.9 mmol/L Cr 0.84 mg/dL BUN 10 mg/dL Phos 3.4 mg/dL Alb 3.2(6/29)   PAB n/a   Hgb 13.9 g/dL Hct 43.4 % HgA1C n/a    Glucose, Serum: 95 mg/dL   24Hr FS:  Skin: intact    Estimated Needs:   [x ] no change since previous assessment (6/30/19)  [ ] recalculated:     Previous Nutrition Diagnosis: · Nutrition Diagnostic Terminology #1: Energy Balance  · Energy Balance: Inadequate energy intake  · Etiology: decreased ability to consume sufficient energy, alcohol dependence/ withdrawal  · Signs/Symptoms: < 50% oral intake x 3 days, 6% wt. loss in 1 year  · Nutrition Intervention: Medical Food Supplements  · Medical and Food Supplements: Commercial beverage; Recommend Ensure Enlive 2 x day=750 calories, 40 grams protein  · Goal/Expected Outcome: pt to consume >75% of meals & supplement; N/a    Nutrition Diagnosis is [ x] ongoing & changed to new diagnosis  [ ] resolved [ ] not applicable       New Nutrition Diagnosis:   [x ] Malnutrition: severe malnutrition in context of acute illness    Related to: alcohol withdrawal, DTs  As evidenced by: <50 % nutrition needs x 10 days, 3.4% wt. loss in >1 week(10 days)    Goal: pt to consume >75% of protein- energy needs via oral or enteral feeding; not met       Nutrition Diagnosis is [x ] ongoing (<50% nutritional needs x 14 days)  [ ] resolved  [ ] improved  [ ] not applicable       New Nutrition Diagnosis: [x ] not applicable       Interventions:   Recommend  [ ] Continue:  [ ] Change Diet To:  [x ] Nutrition Supplement: Increase NGT feeding Jevity 1.2 @ 20ml/hr to goal rate [id number], 1584kcal & 73gm protein to further meet nutritional needs  [ ] Nutrition Support:  [ ] Other:     Monitoring and Evaluation:   [ ] PO intake [ x ] Tolerance to diet prescription [ x ] weights [ x ] labs[ x ] follow up per protocol  [ ] other: Assessment: Pt seen for follow-up.  Pt with ETOHA & withdrawal on Librium protocol unable to tolerate po feeding. Pt remains lethargic with increased secretions and remains at risk for aspiration    Factors impacting intake: [ ] none [ ] nausea  [ ] vomiting [ ] diarrhea [ ] constipation  [ ]chewing problems [ ] swallowing issues  [x ] other: lethargic & increased secretions    Diet Prescription: Diet, NPO with Tube Feed:   Tube Feeding Modality: Nasogastric  Jevity 1.2 Lester  Total Volume for 24 Hours (mL): 960  Continuous  Starting Tube Feed Rate {mL per Hour}: 10  Increase Tube Feed Rate by (mL): 10     Every 8 hours  Until Goal Tube Feed Rate (mL per Hour): 40  Tube Feed Duration (in Hours): 24  Tube Feed Start Time: 20:00 (07-03-19 @ 20:13)    Intake:  NGT feeding provides 960ml, 1152kcal & 53gm protein, TF tolerated well. Residuals=0. Pt received total volume 168ml x 24 hours=201kcal & 9gm protein. Pt received 12% protein needs & 12% energy needs x 24hours    Current Weight: Weight (kg): Wt=73.5kg(7/4/19), Wt=73.4kg(6/28), Wt=75kg(6/22)   % Weight Change  wt stable x 6 days & 1.5kg wt loss(2%) x 12 days.     Physical appearance: +1 edema Valentin hands    Pertinent Medications: MEDICATIONS  (STANDING):  BACItracin   Ointment 1 Application(s) Topical three times a day  BACItracin   Ointment 1 Application(s) Topical two times a day  chlordiazePOXIDE 25 milliGRAM(s) Oral every 8 hours  chlorhexidine 4% Liquid 1 Application(s) Topical <User Schedule>  heparin  Injectable 5000 Unit(s) SubCutaneous every 8 hours  pantoprazole   Suspension 40 milliGRAM(s) Enteral Tube daily  risperiDONE   Tablet 1 milliGRAM(s) Oral two times a day  sucralfate suspension 1 Gram(s) Oral four times a day    MEDICATIONS  (PRN):  acetaminophen   Tablet .. 650 milliGRAM(s) Oral every 6 hours PRN Temp greater or equal to 38C (100.4F), Moderate Pain (4 - 6)  haloperidol    Injectable 5 milliGRAM(s) IV Push every 4 hours PRN agitation  PHENobarbital Injectable 130 milliGRAM(s) IV Push every 15 minutes PRN for CIWA > 12  PHENobarbital Injectable 260 milliGRAM(s) IV Push every 15 minutes PRN ciwa > 20    Pertinent Labs: 07-04 Na 138 mmol/L Glu 95 mg/dL K+ 3.9 mmol/L Cr 0.84 mg/dL BUN 10 mg/dL Phos 3.4 mg/dL Alb 3.2(6/29)   PAB n/a   Hgb 13.9 g/dL Hct 43.4 % HgA1C n/a    Glucose, Serum: 95 mg/dL   24Hr FS:  Skin: intact    Estimated Needs:   [x ] no change since previous assessment (6/30/19)  [ ] recalculated:     Previous Nutrition Diagnosis:   [x ] Malnutrition: severe malnutrition in context of acute illness    Related to: alcohol withdrawal, DTs  As evidenced by: <50 % nutrition needs x 10 days, 3.4% wt. loss in >1 week(10 days)    Goal: pt to consume >75% of protein- energy needs via oral or enteral feeding; not met       Nutrition Diagnosis is [x ] ongoing (<50% nutritional needs x 14 days)  [ ] resolved  [ ] improved  [ ] not applicable       New Nutrition Diagnosis: [x ] not applicable       Interventions:   Recommend  [ ] Continue:  [ ] Change Diet To:  [x ] Nutrition Supplement: Increase NGT feeding Jevity 1.2 @ 20ml/hr to goal rate 55ml/[id number], 1584kcal & 73gm protein to further meet nutritional needs  [ ] Nutrition Support:  [ ] Other:     Monitoring and Evaluation:   [ ] PO intake [ x ] Tolerance to diet prescription [ x ] weights [ x ] labs[ x ] follow up per protocol  [ ] other: Assessment: Pt seen for follow-up.  Pt with ETOHA & withdrawal on Librium protocol unable to tolerate po feeding. Pt remains lethargic with increased secretions and remains at risk for aspiration. Pending swallow evaluation    Factors impacting intake: [ ] none [ ] nausea  [ ] vomiting [ ] diarrhea [ ] constipation  [ ]chewing problems [ ] swallowing issues  [x ] other: lethargic & increased secretions    Diet Prescription: Diet, NPO with Tube Feed:   Tube Feeding Modality: Nasogastric  Jevity 1.2 Lester  Total Volume for 24 Hours (mL): 960  Continuous  Starting Tube Feed Rate {mL per Hour}: 10  Increase Tube Feed Rate by (mL): 10     Every 8 hours  Until Goal Tube Feed Rate (mL per Hour): 40  Tube Feed Duration (in Hours): 24  Tube Feed Start Time: 20:00 (07-03-19 @ 20:13)    Intake:  NGT feeding provides 960ml, 1152kcal & 53gm protein, TF tolerated well. Residuals=0. Pt received total volume 168ml x 24 hours=201kcal & 9gm protein. Pt received 12% protein needs & 12% energy needs x 24hours    Current Weight: Weight (kg): Wt=73.5kg(7/4/19), Wt=73.4kg(6/28), Wt=75kg(6/22)   % Weight Change  wt stable x 6 days & 1.5kg wt loss(2%) x 12 days.     Physical appearance: +1 edema Valentin hands    Pertinent Medications: MEDICATIONS  (STANDING):  BACItracin   Ointment 1 Application(s) Topical three times a day  BACItracin   Ointment 1 Application(s) Topical two times a day  chlordiazePOXIDE 25 milliGRAM(s) Oral every 8 hours  chlorhexidine 4% Liquid 1 Application(s) Topical <User Schedule>  heparin  Injectable 5000 Unit(s) SubCutaneous every 8 hours  pantoprazole   Suspension 40 milliGRAM(s) Enteral Tube daily  risperiDONE   Tablet 1 milliGRAM(s) Oral two times a day  sucralfate suspension 1 Gram(s) Oral four times a day    MEDICATIONS  (PRN):  acetaminophen   Tablet .. 650 milliGRAM(s) Oral every 6 hours PRN Temp greater or equal to 38C (100.4F), Moderate Pain (4 - 6)  haloperidol    Injectable 5 milliGRAM(s) IV Push every 4 hours PRN agitation  PHENobarbital Injectable 130 milliGRAM(s) IV Push every 15 minutes PRN for CIWA > 12  PHENobarbital Injectable 260 milliGRAM(s) IV Push every 15 minutes PRN ciwa > 20    Pertinent Labs: 07-04 Na 138 mmol/L Glu 95 mg/dL K+ 3.9 mmol/L Cr 0.84 mg/dL BUN 10 mg/dL Phos 3.4 mg/dL Alb 3.2(6/29)   PAB n/a   Hgb 13.9 g/dL Hct 43.4 % HgA1C n/a    Glucose, Serum: 95 mg/dL   24Hr FS:  Skin: intact    Estimated Needs:   [x ] no change since previous assessment (6/30/19)  [ ] recalculated:     Previous Nutrition Diagnosis:   [x ] Malnutrition: severe malnutrition in context of acute illness    Related to: alcohol withdrawal, DTs  As evidenced by: <50 % nutrition needs x 10 days, 3.4% wt. loss in >1 week(10 days)    Goal: pt to consume >75% of protein- energy needs via oral or enteral feeding; not met       Nutrition Diagnosis is [x ] ongoing (<50% nutritional needs x 14 days)  [ ] resolved  [ ] improved  [ ] not applicable       New Nutrition Diagnosis: [x ] not applicable       Interventions:   Recommend  [ ] Continue:  [ ] Change Diet To:  [x ] Nutrition Supplement: Increase NGT feeding Jevity 1.2 @ 20ml/hr to goal rate 55ml/wz=[id number], 1584kcal & 73gm protein to further meet nutritional needs  [ ] Nutrition Support:  [x ] Other: Pending swallow evaluation    Monitoring and Evaluation:   [ ] PO intake [ x ] Tolerance to diet prescription [ x ] weights [ x ] labs[ x ] follow up per protocol  [ ] other:

## 2019-07-05 PROCEDURE — 99233 SBSQ HOSP IP/OBS HIGH 50: CPT

## 2019-07-05 RX ORDER — RISPERIDONE 4 MG/1
0.5 TABLET ORAL
Refills: 0 | Status: DISCONTINUED | OUTPATIENT
Start: 2019-07-05 | End: 2019-07-06

## 2019-07-05 RX ADMIN — Medication 1 GRAM(S): at 06:30

## 2019-07-05 RX ADMIN — HEPARIN SODIUM 5000 UNIT(S): 5000 INJECTION INTRAVENOUS; SUBCUTANEOUS at 06:31

## 2019-07-05 RX ADMIN — Medication 1 APPLICATION(S): at 06:30

## 2019-07-05 RX ADMIN — HEPARIN SODIUM 5000 UNIT(S): 5000 INJECTION INTRAVENOUS; SUBCUTANEOUS at 21:26

## 2019-07-05 RX ADMIN — HEPARIN SODIUM 5000 UNIT(S): 5000 INJECTION INTRAVENOUS; SUBCUTANEOUS at 13:00

## 2019-07-05 RX ADMIN — Medication 1 APPLICATION(S): at 21:24

## 2019-07-05 RX ADMIN — Medication 1 APPLICATION(S): at 13:00

## 2019-07-05 RX ADMIN — Medication 1 GRAM(S): at 11:23

## 2019-07-05 RX ADMIN — CHLORHEXIDINE GLUCONATE 1 APPLICATION(S): 213 SOLUTION TOPICAL at 06:31

## 2019-07-05 RX ADMIN — RISPERIDONE 0.5 MILLIGRAM(S): 4 TABLET ORAL at 17:10

## 2019-07-05 RX ADMIN — Medication 1 GRAM(S): at 17:10

## 2019-07-05 RX ADMIN — PANTOPRAZOLE SODIUM 40 MILLIGRAM(S): 20 TABLET, DELAYED RELEASE ORAL at 12:00

## 2019-07-05 RX ADMIN — Medication 1 GRAM(S): at 00:44

## 2019-07-05 RX ADMIN — RISPERIDONE 1 MILLIGRAM(S): 4 TABLET ORAL at 06:30

## 2019-07-05 NOTE — PROGRESS NOTE ADULT - SUBJECTIVE AND OBJECTIVE BOX
INTERVAL HPI/OVERNIGHT EVENTS:  Pt seen and examined at bedside.     Allergies/Intolerance: No Known Allergies      MEDICATIONS  (STANDING):  BACItracin   Ointment 1 Application(s) Topical three times a day  BACItracin   Ointment 1 Application(s) Topical two times a day  chlorhexidine 4% Liquid 1 Application(s) Topical <User Schedule>  heparin  Injectable 5000 Unit(s) SubCutaneous every 8 hours  pantoprazole   Suspension 40 milliGRAM(s) Enteral Tube daily  risperiDONE   Tablet 1 milliGRAM(s) Oral two times a day  sucralfate suspension 1 Gram(s) Oral four times a day    MEDICATIONS  (PRN):  acetaminophen   Tablet .. 650 milliGRAM(s) Oral every 6 hours PRN Temp greater or equal to 38C (100.4F), Moderate Pain (4 - 6)  haloperidol    Injectable 5 milliGRAM(s) IV Push every 4 hours PRN agitation  PHENobarbital Injectable 130 milliGRAM(s) IV Push every 15 minutes PRN for CIWA > 12  PHENobarbital Injectable 260 milliGRAM(s) IV Push every 15 minutes PRN ciwa > 20        ROS: all systems reviewed and wnl      PHYSICAL EXAMINATION:  Vital Signs Last 24 Hrs  T(C): 37.2 (05 Jul 2019 07:18), Max: 37.6 (05 Jul 2019 05:29)  T(F): 98.9 (05 Jul 2019 07:18), Max: 99.7 (05 Jul 2019 05:29)  HR: 90 (05 Jul 2019 11:43) (76 - 94)  BP: 97/65 (05 Jul 2019 11:43) (93/59 - 113/81)  BP(mean): 65 (05 Jul 2019 10:12) (65 - 89)  RR: 15 (05 Jul 2019 11:43) (15 - 23)  SpO2: 97% (05 Jul 2019 11:43) (94% - 97%)  CAPILLARY BLOOD GLUCOSE          07-04 @ 07:01  -  07-05 @ 07:00  --------------------------------------------------------  IN: 920 mL / OUT: 250 mL / NET: 670 mL    07-05 @ 07:01  -  07-05 @ 12:21  --------------------------------------------------------  IN: 120 mL / OUT: 0 mL / NET: 120 mL        GENERAL:   NECK: supple, No JVD  CHEST/LUNG: clear to auscultation bilaterally; no rales, rhonchi, or wheezing b/l  HEART: normal S1, S2  ABDOMEN: BS+, soft, ND, NT   EXTREMITIES:  pulses palpable; no clubbing, cyanosis, or edema b/l LEs  SKIN: no rashes or lesions      LABS:                        13.9   5.98  )-----------( 462      ( 04 Jul 2019 03:47 )             43.4     07-04    138  |  107  |  10  ----------------------------<  95  3.9   |  25  |  0.84    Ca    9.4      04 Jul 2019 03:47  Phos  3.4     07-04  Mg     2.4     07-04 INTERVAL HPI/OVERNIGHT EVENTS:  Pt seen and examined at bedside.     Allergies/Intolerance: No Known Allergies      MEDICATIONS  (STANDING):  BACItracin   Ointment 1 Application(s) Topical three times a day  BACItracin   Ointment 1 Application(s) Topical two times a day  chlorhexidine 4% Liquid 1 Application(s) Topical <User Schedule>  heparin  Injectable 5000 Unit(s) SubCutaneous every 8 hours  pantoprazole   Suspension 40 milliGRAM(s) Enteral Tube daily  risperiDONE   Tablet 1 milliGRAM(s) Oral two times a day  sucralfate suspension 1 Gram(s) Oral four times a day    MEDICATIONS  (PRN):  acetaminophen   Tablet .. 650 milliGRAM(s) Oral every 6 hours PRN Temp greater or equal to 38C (100.4F), Moderate Pain (4 - 6)  haloperidol    Injectable 5 milliGRAM(s) IV Push every 4 hours PRN agitation  PHENobarbital Injectable 130 milliGRAM(s) IV Push every 15 minutes PRN for CIWA > 12  PHENobarbital Injectable 260 milliGRAM(s) IV Push every 15 minutes PRN ciwa > 20        ROS: all systems reviewed and wnl      PHYSICAL EXAMINATION:  Vital Signs Last 24 Hrs  T(C): 37.2 (05 Jul 2019 07:18), Max: 37.6 (05 Jul 2019 05:29)  T(F): 98.9 (05 Jul 2019 07:18), Max: 99.7 (05 Jul 2019 05:29)  HR: 90 (05 Jul 2019 11:43) (76 - 94)  BP: 97/65 (05 Jul 2019 11:43) (93/59 - 113/81)  BP(mean): 65 (05 Jul 2019 10:12) (65 - 89)  RR: 15 (05 Jul 2019 11:43) (15 - 23)  SpO2: 97% (05 Jul 2019 11:43) (94% - 97%)  CAPILLARY BLOOD GLUCOSE          07-04 @ 07:01  -  07-05 @ 07:00  --------------------------------------------------------  IN: 920 mL / OUT: 250 mL / NET: 670 mL    07-05 @ 07:01  -  07-05 @ 12:21  --------------------------------------------------------  IN: 120 mL / OUT: 0 mL / NET: 120 mL        GENERAL: in bed, NGT in place, sleeping, no fevers or agitation  NECK: supple, No JVD  CHEST/LUNG: clear to auscultation bilaterally; no rales, rhonchi, or wheezing b/l  HEART: normal S1, S2  ABDOMEN: BS+, soft, ND, NT   EXTREMITIES:  pulses palpable; no clubbing, cyanosis, or edema b/l LEs  SKIN: no rashes or lesions      LABS:                        13.9   5.98  )-----------( 462      ( 04 Jul 2019 03:47 )             43.4     07-04    138  |  107  |  10  ----------------------------<  95  3.9   |  25  |  0.84    Ca    9.4      04 Jul 2019 03:47  Phos  3.4     07-04  Mg     2.4     07-04

## 2019-07-05 NOTE — PROGRESS NOTE ADULT - PROBLEM SELECTOR PLAN 1
Taper librium per [facility] protocol. IVP phenobarb PRN and haldol PRN  place NGT for feeds, speech dn swallow consult. can go to floors. Risperdal 1 mg bid. No routine labs needed. Taper phenobarb as tolerated. Librium taper complete. Stopped IVP phenobarb. Continue NGT feeds for now. , speech and swallow consult in progress. May need MBS Monday for trial of PO intake as mental status improves. Decrease  Risperdal to .5 mg bid. No routine labs needed.

## 2019-07-06 PROCEDURE — 99232 SBSQ HOSP IP/OBS MODERATE 35: CPT

## 2019-07-06 RX ORDER — RISPERIDONE 4 MG/1
0.5 TABLET ORAL AT BEDTIME
Refills: 0 | Status: DISCONTINUED | OUTPATIENT
Start: 2019-07-06 | End: 2019-07-07

## 2019-07-06 RX ADMIN — Medication 650 MILLIGRAM(S): at 19:49

## 2019-07-06 RX ADMIN — RISPERIDONE 0.5 MILLIGRAM(S): 4 TABLET ORAL at 05:37

## 2019-07-06 RX ADMIN — HEPARIN SODIUM 5000 UNIT(S): 5000 INJECTION INTRAVENOUS; SUBCUTANEOUS at 05:37

## 2019-07-06 RX ADMIN — Medication 1 APPLICATION(S): at 05:36

## 2019-07-06 RX ADMIN — HEPARIN SODIUM 5000 UNIT(S): 5000 INJECTION INTRAVENOUS; SUBCUTANEOUS at 21:54

## 2019-07-06 RX ADMIN — PANTOPRAZOLE SODIUM 40 MILLIGRAM(S): 20 TABLET, DELAYED RELEASE ORAL at 11:48

## 2019-07-06 RX ADMIN — Medication 1 GRAM(S): at 11:48

## 2019-07-06 RX ADMIN — HEPARIN SODIUM 5000 UNIT(S): 5000 INJECTION INTRAVENOUS; SUBCUTANEOUS at 13:19

## 2019-07-06 RX ADMIN — Medication 1 APPLICATION(S): at 21:54

## 2019-07-06 RX ADMIN — Medication 1 GRAM(S): at 05:36

## 2019-07-06 RX ADMIN — Medication 650 MILLIGRAM(S): at 20:49

## 2019-07-06 RX ADMIN — Medication 1 APPLICATION(S): at 17:25

## 2019-07-06 RX ADMIN — RISPERIDONE 0.5 MILLIGRAM(S): 4 TABLET ORAL at 21:54

## 2019-07-06 RX ADMIN — Medication 1 GRAM(S): at 17:25

## 2019-07-06 RX ADMIN — Medication 1 GRAM(S): at 00:55

## 2019-07-06 RX ADMIN — Medication 1 APPLICATION(S): at 13:19

## 2019-07-06 NOTE — PROGRESS NOTE ADULT - SUBJECTIVE AND OBJECTIVE BOX
INTERVAL HPI/OVERNIGHT EVENTS:  Pt seen and examined at bedside.     Allergies/Intolerance: No Known Allergies      MEDICATIONS  (STANDING):  BACItracin   Ointment 1 Application(s) Topical three times a day  BACItracin   Ointment 1 Application(s) Topical two times a day  chlorhexidine 4% Liquid 1 Application(s) Topical <User Schedule>  heparin  Injectable 5000 Unit(s) SubCutaneous every 8 hours  pantoprazole   Suspension 40 milliGRAM(s) Enteral Tube daily  risperiDONE   Tablet 0.5 milliGRAM(s) Oral two times a day  sucralfate suspension 1 Gram(s) Oral four times a day    MEDICATIONS  (PRN):  acetaminophen   Tablet .. 650 milliGRAM(s) Oral every 6 hours PRN Temp greater or equal to 38C (100.4F), Moderate Pain (4 - 6)  haloperidol    Injectable 5 milliGRAM(s) IV Push every 4 hours PRN agitation        ROS: all systems reviewed and wnl      PHYSICAL EXAMINATION:  Vital Signs Last 24 Hrs  T(C): 37.4 (06 Jul 2019 10:47), Max: 37.4 (06 Jul 2019 10:47)  T(F): 99.3 (06 Jul 2019 10:47), Max: 99.3 (06 Jul 2019 10:47)  HR: 85 (06 Jul 2019 10:47) (72 - 90)  BP: 110/65 (06 Jul 2019 10:47) (97/65 - 111/77)  BP(mean): --  RR: 17 (06 Jul 2019 10:47) (15 - 18)  SpO2: 95% (06 Jul 2019 10:47) (95% - 100%)  CAPILLARY BLOOD GLUCOSE          07-05 @ 07:01  -  07-06 @ 07:00  --------------------------------------------------------  IN: 120 mL / OUT: 0 mL / NET: 120 mL        GENERAL:   NECK: supple, No JVD  CHEST/LUNG: clear to auscultation bilaterally; no rales, rhonchi, or wheezing b/l  HEART: normal S1, S2  ABDOMEN: BS+, soft, ND, NT   EXTREMITIES:  pulses palpable; no clubbing, cyanosis, or edema b/l LEs  SKIN: no rashes or lesions      LABS: INTERVAL HPI/OVERNIGHT EVENTS:  Pt seen and examined at bedside.     Allergies/Intolerance: No Known Allergies      MEDICATIONS  (STANDING):  BACItracin   Ointment 1 Application(s) Topical three times a day  BACItracin   Ointment 1 Application(s) Topical two times a day  chlorhexidine 4% Liquid 1 Application(s) Topical <User Schedule>  heparin  Injectable 5000 Unit(s) SubCutaneous every 8 hours  pantoprazole   Suspension 40 milliGRAM(s) Enteral Tube daily  risperiDONE   Tablet 0.5 milliGRAM(s) Oral two times a day  sucralfate suspension 1 Gram(s) Oral four times a day    MEDICATIONS  (PRN):  acetaminophen   Tablet .. 650 milliGRAM(s) Oral every 6 hours PRN Temp greater or equal to 38C (100.4F), Moderate Pain (4 - 6)  haloperidol    Injectable 5 milliGRAM(s) IV Push every 4 hours PRN agitation        ROS: all systems reviewed and wnl      PHYSICAL EXAMINATION:  Vital Signs Last 24 Hrs  T(C): 37.4 (06 Jul 2019 10:47), Max: 37.4 (06 Jul 2019 10:47)  T(F): 99.3 (06 Jul 2019 10:47), Max: 99.3 (06 Jul 2019 10:47)  HR: 85 (06 Jul 2019 10:47) (72 - 90)  BP: 110/65 (06 Jul 2019 10:47) (97/65 - 111/77)  BP(mean): --  RR: 17 (06 Jul 2019 10:47) (15 - 18)  SpO2: 95% (06 Jul 2019 10:47) (95% - 100%)  CAPILLARY BLOOD GLUCOSE          07-05 @ 07:01  -  07-06 @ 07:00  --------------------------------------------------------  IN: 120 mL / OUT: 0 mL / NET: 120 mL        GENERAL: stable in bed, NGT in place, no fevers or cough, alert, opens eyes, follows commands  NECK: supple, No JVD  CHEST/LUNG: clear to auscultation bilaterally; no rales, rhonchi, or wheezing b/l  HEART: normal S1, S2  ABDOMEN: BS+, soft, ND, NT   EXTREMITIES:  pulses palpable; no clubbing, cyanosis, or edema b/l LEs  SKIN: no rashes or lesions      LABS:

## 2019-07-06 NOTE — PROGRESS NOTE ADULT - PROBLEM SELECTOR PLAN 1
Librium taper complete. Stopped IVP phenobarb. Continue NGT feeds for now. , speech and swallow consult in progress. May need MBS Monday for trial of PO intake as mental status improves. Decrease  Risperdal to .5 mg bid. No routine labs needed. Librium taper stopped. Stopped IVP phenobarb. Continue NGT feeds for now. , speech and swallow consult in progress. May need repeat MBS Monday for trial of PO intake as mental status improves. Decrease  Risperdal to .5 mg QPM. No routine labs needed. Has not required additional IVP Haldol. CXR portable in AM,   staff reports a lot of secretions.

## 2019-07-07 PROCEDURE — 71045 X-RAY EXAM CHEST 1 VIEW: CPT | Mod: 26

## 2019-07-07 PROCEDURE — 99232 SBSQ HOSP IP/OBS MODERATE 35: CPT

## 2019-07-07 RX ORDER — LANOLIN ALCOHOL/MO/W.PET/CERES
3 CREAM (GRAM) TOPICAL AT BEDTIME
Refills: 0 | Status: DISCONTINUED | OUTPATIENT
Start: 2019-07-07 | End: 2019-07-08

## 2019-07-07 RX ORDER — HEPARIN SODIUM 5000 [USP'U]/ML
5000 INJECTION INTRAVENOUS; SUBCUTANEOUS EVERY 12 HOURS
Refills: 0 | Status: DISCONTINUED | OUTPATIENT
Start: 2019-07-07 | End: 2019-07-08

## 2019-07-07 RX ORDER — IBUPROFEN 200 MG
400 TABLET ORAL ONCE
Refills: 0 | Status: COMPLETED | OUTPATIENT
Start: 2019-07-07 | End: 2019-07-07

## 2019-07-07 RX ORDER — LANOLIN ALCOHOL/MO/W.PET/CERES
3 CREAM (GRAM) TOPICAL AT BEDTIME
Refills: 0 | Status: DISCONTINUED | OUTPATIENT
Start: 2019-07-07 | End: 2019-07-07

## 2019-07-07 RX ADMIN — Medication 3 MILLIGRAM(S): at 00:29

## 2019-07-07 RX ADMIN — Medication 1 APPLICATION(S): at 17:08

## 2019-07-07 RX ADMIN — Medication 400 MILLIGRAM(S): at 15:00

## 2019-07-07 RX ADMIN — PANTOPRAZOLE SODIUM 40 MILLIGRAM(S): 20 TABLET, DELAYED RELEASE ORAL at 12:08

## 2019-07-07 RX ADMIN — Medication 650 MILLIGRAM(S): at 19:36

## 2019-07-07 RX ADMIN — Medication 1 APPLICATION(S): at 21:25

## 2019-07-07 RX ADMIN — Medication 1 GRAM(S): at 17:08

## 2019-07-07 RX ADMIN — Medication 650 MILLIGRAM(S): at 20:15

## 2019-07-07 RX ADMIN — Medication 400 MILLIGRAM(S): at 14:13

## 2019-07-07 RX ADMIN — Medication 1 APPLICATION(S): at 05:57

## 2019-07-07 RX ADMIN — HEPARIN SODIUM 5000 UNIT(S): 5000 INJECTION INTRAVENOUS; SUBCUTANEOUS at 17:09

## 2019-07-07 RX ADMIN — Medication 1 GRAM(S): at 00:29

## 2019-07-07 RX ADMIN — Medication 1 GRAM(S): at 11:34

## 2019-07-07 RX ADMIN — Medication 1 APPLICATION(S): at 14:15

## 2019-07-07 RX ADMIN — CHLORHEXIDINE GLUCONATE 1 APPLICATION(S): 213 SOLUTION TOPICAL at 07:49

## 2019-07-07 RX ADMIN — Medication 3 MILLIGRAM(S): at 21:25

## 2019-07-07 RX ADMIN — Medication 1 GRAM(S): at 05:57

## 2019-07-07 RX ADMIN — Medication 650 MILLIGRAM(S): at 08:08

## 2019-07-07 RX ADMIN — HEPARIN SODIUM 5000 UNIT(S): 5000 INJECTION INTRAVENOUS; SUBCUTANEOUS at 05:57

## 2019-07-07 RX ADMIN — Medication 650 MILLIGRAM(S): at 09:00

## 2019-07-07 NOTE — PROGRESS NOTE ADULT - SUBJECTIVE AND OBJECTIVE BOX
INTERVAL HPI/OVERNIGHT EVENTS:  Pt seen and examined at bedside.     Allergies/Intolerance: No Known Allergies      MEDICATIONS  (STANDING):  BACItracin   Ointment 1 Application(s) Topical three times a day  BACItracin   Ointment 1 Application(s) Topical two times a day  chlorhexidine 4% Liquid 1 Application(s) Topical <User Schedule>  heparin  Injectable 5000 Unit(s) SubCutaneous every 8 hours  pantoprazole   Suspension 40 milliGRAM(s) Enteral Tube daily  risperiDONE   Tablet 0.5 milliGRAM(s) Oral at bedtime  sucralfate suspension 1 Gram(s) Oral four times a day    MEDICATIONS  (PRN):  acetaminophen   Tablet .. 650 milliGRAM(s) Oral every 6 hours PRN Temp greater or equal to 38C (100.4F), Moderate Pain (4 - 6)  haloperidol    Injectable 5 milliGRAM(s) IV Push every 4 hours PRN agitation  melatonin 3 milliGRAM(s) Oral at bedtime PRN Insomnia        ROS: all systems reviewed and wnl      PHYSICAL EXAMINATION:  Vital Signs Last 24 Hrs  T(C): 37.2 (07 Jul 2019 05:39), Max: 37.4 (06 Jul 2019 10:47)  T(F): 99 (07 Jul 2019 05:39), Max: 99.3 (06 Jul 2019 10:47)  HR: 72 (07 Jul 2019 05:39) (72 - 85)  BP: 101/61 (07 Jul 2019 05:39) (101/61 - 129/87)  BP(mean): --  RR: 18 (07 Jul 2019 05:39) (17 - 18)  SpO2: 96% (07 Jul 2019 05:39) (95% - 96%)  CAPILLARY BLOOD GLUCOSE          07-06 @ 07:01  -  07-07 @ 07:00  --------------------------------------------------------  IN: 970 mL / OUT: 0 mL / NET: 970 mL        GENERAL:   NECK: supple, No JVD  CHEST/LUNG: clear to auscultation bilaterally; no rales, rhonchi, or wheezing b/l  HEART: normal S1, S2  ABDOMEN: BS+, soft, ND, NT   EXTREMITIES:  pulses palpable; no clubbing, cyanosis, or edema b/l LEs  SKIN: no rashes or lesions      LABS: INTERVAL HPI/OVERNIGHT EVENTS:  Pt seen and examined at bedside.     Allergies/Intolerance: No Known Allergies      MEDICATIONS  (STANDING):  BACItracin   Ointment 1 Application(s) Topical three times a day  BACItracin   Ointment 1 Application(s) Topical two times a day  chlorhexidine 4% Liquid 1 Application(s) Topical <User Schedule>  heparin  Injectable 5000 Unit(s) SubCutaneous every 8 hours  pantoprazole   Suspension 40 milliGRAM(s) Enteral Tube daily  risperiDONE   Tablet 0.5 milliGRAM(s) Oral at bedtime  sucralfate suspension 1 Gram(s) Oral four times a day    MEDICATIONS  (PRN):  acetaminophen   Tablet .. 650 milliGRAM(s) Oral every 6 hours PRN Temp greater or equal to 38C (100.4F), Moderate Pain (4 - 6)  haloperidol    Injectable 5 milliGRAM(s) IV Push every 4 hours PRN agitation  melatonin 3 milliGRAM(s) Oral at bedtime PRN Insomnia        ROS: all systems reviewed and wnl      PHYSICAL EXAMINATION:  Vital Signs Last 24 Hrs  T(C): 37.2 (07 Jul 2019 05:39), Max: 37.4 (06 Jul 2019 10:47)  T(F): 99 (07 Jul 2019 05:39), Max: 99.3 (06 Jul 2019 10:47)  HR: 72 (07 Jul 2019 05:39) (72 - 85)  BP: 101/61 (07 Jul 2019 05:39) (101/61 - 129/87)  BP(mean): --  RR: 18 (07 Jul 2019 05:39) (17 - 18)  SpO2: 96% (07 Jul 2019 05:39) (95% - 96%)  CAPILLARY BLOOD GLUCOSE          07-06 @ 07:01  -  07-07 @ 07:00  --------------------------------------------------------  IN: 970 mL / OUT: 0 mL / NET: 970 mL        GENERAL: stable in bed, NGT in place, no agitation or cough  NECK: supple, No JVD  CHEST/LUNG: clear to auscultation bilaterally; no rales, rhonchi, or wheezing b/l  HEART: normal S1, S2  ABDOMEN: BS+, soft, ND, NT   EXTREMITIES:  pulses palpable; no clubbing, cyanosis, or edema b/l LEs  SKIN: no rashes or lesions      LABS:

## 2019-07-07 NOTE — PROGRESS NOTE ADULT - PROBLEM SELECTOR PROBLEM 1
Alcohol withdrawal syndrome without complication
Nausea and vomiting, intractability of vomiting not specified, unspecified vomiting type
Alcohol withdrawal syndrome without complication

## 2019-07-07 NOTE — PROGRESS NOTE ADULT - PROVIDER SPECIALTY LIST ADULT
Critical Care
Gastroenterology
Hospitalist
Gastroenterology
Critical Care
Hospitalist

## 2019-07-07 NOTE — PROGRESS NOTE ADULT - PROBLEM SELECTOR PLAN 1
Librium taper stopped. Stopped IVP phenobarb. Continue NGT feeds for now. , Speech and swallow reconsult for Monday. May need repeat MBS Monday for trial of PO intake as mental status improves. Stop Risperdal. No routine labs needed. Has not required additional IVP Haldol. CXR portable in AM staff reports a lot of secretions. Librium taper stopped. Stopped IVP phenobarb. Continue NGT feeds for now. , Speech and swallow reconsult for Monday. May need repeat MBS Monday for trial of PO intake as mental status improves. Stop Risperdal. No routine labs needed. Has not required additional IVP Haldol. CXR today preliminary report is clear, official read to follow. No pneumonia.

## 2019-07-07 NOTE — PROGRESS NOTE ADULT - REASON FOR ADMISSION
vomiting blood

## 2019-07-07 NOTE — PROGRESS NOTE ADULT - ASSESSMENT
55 yo male PMH alcohol abuse and PUD, presents in ED c/o shakiness, bloody vomit and black "hard" stool the last few days. patient reports he has not drank in 2 days. No fever/chills, No photophobia/eye pain/changes in vision, No ear pain/sore throat/dysphagia, No chest pain/palpitations, no SOB/cough/wheeze/stridor, No abdominal pain, No D, no dysuria/frequency/discharge, No neck/back pain, no rash, no changes in neurological status/function. he was admitted two weeks ago for the same problem and had endoscopy that showed  ESOPHAGUS:  Mild narrowing in UES ( minimal tension necessary ), Superficial ulceration / esophagitis lower 1/2 of esophagus s/p biopsy, stomach mild antral gastritis s/p biopsy. DUODENUM: WNL. Was discharged that admission on ppi and carfate but continued to drink and only stoppede two days ago.

## 2019-07-08 ENCOUNTER — TRANSCRIPTION ENCOUNTER (OUTPATIENT)
Age: 54
End: 2019-07-08

## 2019-07-08 VITALS
SYSTOLIC BLOOD PRESSURE: 119 MMHG | TEMPERATURE: 98 F | OXYGEN SATURATION: 98 % | DIASTOLIC BLOOD PRESSURE: 73 MMHG | RESPIRATION RATE: 15 BRPM | HEART RATE: 72 BPM

## 2019-07-08 PROCEDURE — 99239 HOSP IP/OBS DSCHRG MGMT >30: CPT

## 2019-07-08 RX ORDER — LACTULOSE 10 G/15ML
20 SOLUTION ORAL ONCE
Refills: 0 | Status: COMPLETED | OUTPATIENT
Start: 2019-07-08 | End: 2019-07-08

## 2019-07-08 RX ADMIN — Medication 1 GRAM(S): at 01:21

## 2019-07-08 RX ADMIN — Medication 1 APPLICATION(S): at 05:43

## 2019-07-08 RX ADMIN — Medication 1 APPLICATION(S): at 13:41

## 2019-07-08 RX ADMIN — PANTOPRAZOLE SODIUM 40 MILLIGRAM(S): 20 TABLET, DELAYED RELEASE ORAL at 12:29

## 2019-07-08 RX ADMIN — HEPARIN SODIUM 5000 UNIT(S): 5000 INJECTION INTRAVENOUS; SUBCUTANEOUS at 05:43

## 2019-07-08 RX ADMIN — Medication 650 MILLIGRAM(S): at 05:50

## 2019-07-08 RX ADMIN — Medication 1 GRAM(S): at 05:43

## 2019-07-08 RX ADMIN — LACTULOSE 20 GRAM(S): 10 SOLUTION ORAL at 13:57

## 2019-07-08 RX ADMIN — Medication 650 MILLIGRAM(S): at 06:45

## 2019-07-08 RX ADMIN — Medication 1 GRAM(S): at 12:29

## 2019-07-08 NOTE — DISCHARGE NOTE PROVIDER - HOSPITAL COURSE
53 yo m with pmh of alcohol abuse and pud presents in ED c/o shakiness, bloody vomit and black "hard" stool the last few days. patient reports he has not drank in 2 days. No fever/chills, No photophobia/eye pain/changes in vision, No ear pain/sore throat/dysphagia, No chest pain/palpitations, no SOB/cough/wheeze/stridor, No abdominal pain, No D, no dysuria/frequency/discharge, No neck/back pain, no rash, no changes in neurological status/function. he was admitted two weeks ago for the same problem and had endoscopy that showed     ESOPHAGUS:  Mild narrowing in UES ( minimal tension necessary ), Superficial ulceration / esophagitis lower 1/2 of esophagus s/p biopsy        STOMACH: mild antral gastritis s/p biopsy        DUODENUM: WNL    and was discharged on a ppi and carfate but continued to drink and only stopped two days ago     In hospital pt admitted to ICU for etoh withdrawal. Placed on CIWA protocol.     CIWA now 2, pt alert, no agitation.

## 2019-07-08 NOTE — SWALLOW BEDSIDE ASSESSMENT ADULT - SWALLOW EVAL: RECOMMENDED FEEDING/EATING TECHNIQUES
maintain upright posture during/after eating for 30 mins/allow for swallow between intakes/check mouth frequently for oral residue/pocketing/position upright (90 degrees)/small sips/bites/oral hygiene/alternate food with liquid

## 2019-07-08 NOTE — DISCHARGE NOTE NURSING/CASE MANAGEMENT/SOCIAL WORK - NSDCDPATPORTLINK_GEN_ALL_CORE
You can access the [facility] Portal, offered by [facility], by registering with the following website: [email or web address]

## 2019-07-08 NOTE — SWALLOW BEDSIDE ASSESSMENT ADULT - SLP GENERAL OBSERVATIONS
Pt received at bedside, alert and cooperative. Pt appeared eager to consume PO, characterized by rapid intake. Pt required cues to decrease rate/bite size throughout assessment. Pt educated re: additional compensatory strategies to utilize during PO intake.

## 2019-07-08 NOTE — SWALLOW BEDSIDE ASSESSMENT ADULT - COMMENTS
CXR: 7/7/2019: IMPRESSION: No focal consolidation or pleural effusion. NG tube tip within the stomach.

## 2019-07-08 NOTE — CHART NOTE - NSCHARTNOTEFT_GEN_A_CORE
Assessment: Pt seen for follow-up.  Pt with mild narrowing esophagus , ulceration/esophagitis. Pending repeat swallow evaluation.     Factors impacting intake: [ ] none [ ] nausea  [ ] vomiting [ ] diarrhea [x ] constipation  [ ]chewing problems [ ] swallowing issues  [ ] other:     Diet Prescription: Diet, NPO with Tube Feed:   Tube Feeding Modality: Nasogastric  Jevity 1.2 Lester  Total Volume for 24 Hours (mL): 1320  Continuous  Starting Tube Feed Rate {mL per Hour}: 20  Increase Tube Feed Rate by (mL): 10     Every 8 hours  Until Goal Tube Feed Rate (mL per Hour): 55  Tube Feed Duration (in Hours): 24  Tube Feed Start Time: 10:30 (07-04-19 @ 10:31)    Intake: TF provides 1320ml, 1584kcal & 73gm protein. Pt received total volume 1278ml, 1534kcal & 71gm protein x 24 hours. Pt received 91% energy needs & 89gm protein x 24hours. Residuals=0-30ml & No BM x 14 days. RN notified. Would consider laxative    Current Weight: Weight (kg): Wt=73kg(7/5), Wt=74.4 (07-03 @ 06:00), Wt=73.6kg(6/25)  % Weight Change Wt remains stable x 10 days    Physical appearance:  +1 edema Valentin hand    Pertinent Medications: MEDICATIONS  (STANDING):  BACItracin   Ointment 1 Application(s) Topical three times a day  BACItracin   Ointment 1 Application(s) Topical two times a day  chlorhexidine 4% Liquid 1 Application(s) Topical <User Schedule>  heparin  Injectable 5000 Unit(s) SubCutaneous every 12 hours  melatonin 3 milliGRAM(s) Oral at bedtime  pantoprazole   Suspension 40 milliGRAM(s) Enteral Tube daily  sucralfate suspension 1 Gram(s) Oral four times a day    MEDICATIONS  (PRN):  acetaminophen   Tablet .. 650 milliGRAM(s) Oral every 6 hours PRN Temp greater or equal to 38C (100.4F), Moderate Pain (4 - 6)  haloperidol    Injectable 5 milliGRAM(s) IV Push every 4 hours PRN agitation    Pertinent Labs: 07-04 Na 138 mmol/L Glu 95 mg/dL K+ 3.9 mmol/L Cr 0.84 mg/dL BUN 10 mg/dL Phos 3.4 mg/dL Alb n/a   PAB n/a   Hgb 13.9 g/dL Hct 43.4 % HgA1C n/a     24Hr FS:  Skin: intact    Estimated Needs:   [ ] no change since previous assessment (6/30/19)  [ ] recalculated:     Previous Nutrition Diagnosis: [x ] Malnutrition: severe malnutrition in context of acute illness    Related to: alcohol withdrawal, DTs  As evidenced by: <50 % nutrition needs x 14 days hospitalization; 3.4% wt. loss in >1 week(10 days)    Goal: pt to consume >75% of protein- energy needs via oral or enteral feeding;  met     Nutrition Diagnosis is [ ] ongoing   [ ] resolved  [x ] improved  [ ] not applicable        New Nutrition Diagnosis: [x ] not applicable       Interventions:   Recommend  [x ] Continue: Jevity 1.2 @ 55ml/hr via [id number], 1584kcal & 73gm protein  [ ] Change Diet To:  [ ] Nutrition Supplement:  [ ] Nutrition Support:  [ ] Other:     Monitoring and Evaluation:   [ ] PO intake [ x ] Tolerance to diet prescription [ x ] weights [ x ] labs[ x ] follow up per protocol  [ ] other: Assessment: Pt seen for follow-up.  Pt with mild narrowing esophagus , ulceration/esophagitis. Pending repeat swallow evaluation.  Pt with s/p alcohol withdrawal-librium stopped.    Factors impacting intake: [ ] none [ ] nausea  [ ] vomiting [ ] diarrhea [x ] constipation  [ ]chewing problems [ ] swallowing issues  [ ] other:     Diet Prescription: Diet, NPO with Tube Feed:   Tube Feeding Modality: Nasogastric  Jevity 1.2 Lester  Total Volume for 24 Hours (mL): 1320  Continuous  Starting Tube Feed Rate {mL per Hour}: 20  Increase Tube Feed Rate by (mL): 10     Every 8 hours  Until Goal Tube Feed Rate (mL per Hour): 55  Tube Feed Duration (in Hours): 24  Tube Feed Start Time: 10:30 (07-04-19 @ 10:31)    Intake: TF provides 1320ml, 1584kcal & 73gm protein. Pt received total volume 1278ml, 1534kcal & 71gm protein x 24 hours. Pt received 91% energy needs & 89gm protein x 24hours. Residuals=0-30ml & No BM x 14 days. RN notified. Would consider laxative    Current Weight: Weight (kg): Wt=73kg(7/5), Wt=74.4 (07-03 @ 06:00), Wt=73.6kg(6/25)  % Weight Change Wt remains stable x 10 days    Physical appearance:  +1 edema Valentin hand; Nutrition focused physical exam conducted; Subcutaneous fat loss: [WNL ] Orbital fat pads region, [WNL ]Buccal fat region, [WNL ]Triceps region,  [WNL ]Ribs region.  Muscle wasting: [WNL ]Temples region, [WNL ]Clavicle region, [WNL ]Shoulder region, [unable ]Scapula region, [WNL ]Interosseous region,  [Mild ]thigh region, [Mild ]Calf region    Pertinent Medications: MEDICATIONS  (STANDING):  BACItracin   Ointment 1 Application(s) Topical three times a day  BACItracin   Ointment 1 Application(s) Topical two times a day  chlorhexidine 4% Liquid 1 Application(s) Topical <User Schedule>  heparin  Injectable 5000 Unit(s) SubCutaneous every 12 hours  melatonin 3 milliGRAM(s) Oral at bedtime  pantoprazole   Suspension 40 milliGRAM(s) Enteral Tube daily  sucralfate suspension 1 Gram(s) Oral four times a day    MEDICATIONS  (PRN):  acetaminophen   Tablet .. 650 milliGRAM(s) Oral every 6 hours PRN Temp greater or equal to 38C (100.4F), Moderate Pain (4 - 6)  haloperidol    Injectable 5 milliGRAM(s) IV Push every 4 hours PRN agitation    Pertinent Labs: 07-04 Na 138 mmol/L Glu 95 mg/dL K+ 3.9 mmol/L Cr 0.84 mg/dL BUN 10 mg/dL Phos 3.4 mg/dL Alb 3.2(6/29)   PAB n/a   Hgb 13.9 g/dL Hct 43.4 % HgA1C n/a     24Hr FS:  Skin: intact    Estimated Needs:   [ ] no change since previous assessment (6/30/19)  [ ] recalculated:     Previous Nutrition Diagnosis: [x ] Malnutrition: severe malnutrition in context of acute illness    Related to: alcohol withdrawal, DTs  As evidenced by: <50 % nutrition needs x 14 days hospitalization; 3.4% wt. loss in >1 week(10 days)    Goal: pt to consume >75% of protein- energy needs via oral or enteral feeding;  met     Nutrition Diagnosis is [ ] ongoing   [ ] resolved  [x ] improved  [ ] not applicable        New Nutrition Diagnosis: [x ] not applicable       Interventions:   Recommend  [x ] Continue: Jevity 1.2 @ 55ml/hr via [id number], 1584kcal & 73gm protein  [ ] Change Diet To:  [ ] Nutrition Supplement:  [ ] Nutrition Support:  [x ] Other: consider laxative due to no BM x 14 days    Monitoring and Evaluation:   [ ] PO intake [ x ] Tolerance to diet prescription [ x ] weights [ x ] labs[ x ] follow up per protocol  [ ] other: Assessment: Pt seen for follow-up.  Pt with mild narrowing esophagus , ulceration/esophagitis. Pending repeat swallow evaluation.  Pt with s/p alcohol withdrawal-librium stopped. s/p swallow evaluation attempted, however pt lethargic. Pt currently awake & alert @ this time.    Factors impacting intake: [ ] none [ ] nausea  [ ] vomiting [ ] diarrhea [x ] constipation  [ ]chewing problems [ ] swallowing issues  [ ] other:     Diet Prescription: Diet, NPO with Tube Feed:   Tube Feeding Modality: Nasogastric  Jevity 1.2 Lester  Total Volume for 24 Hours (mL): 1320  Continuous  Starting Tube Feed Rate {mL per Hour}: 20  Increase Tube Feed Rate by (mL): 10     Every 8 hours  Until Goal Tube Feed Rate (mL per Hour): 55  Tube Feed Duration (in Hours): 24  Tube Feed Start Time: 10:30 (07-04-19 @ 10:31)    Intake: TF provides 1320ml, 1584kcal & 73gm protein. Pt received total volume 1278ml, 1534kcal & 71gm protein x 24 hours. Pt received 91% energy needs & 89gm protein x 24hours. Residuals=0-30ml & No BM x 14 days. RN notified. Would consider laxative    Current Weight: Weight (kg): Wt=73kg(7/5), Wt=74.4 (07-03 @ 06:00), Wt=73.6kg(6/25)  % Weight Change Wt remains stable x 10 days    Physical appearance:  +1 edema Valentin hand; Nutrition focused physical exam conducted; Subcutaneous fat loss: [WNL ] Orbital fat pads region, [WNL ]Buccal fat region, [WNL ]Triceps region,  [WNL ]Ribs region.  Muscle wasting: [WNL ]Temples region, [WNL ]Clavicle region, [WNL ]Shoulder region, [unable ]Scapula region, [WNL ]Interosseous region,  [Mild ]thigh region, [Mild ]Calf region    Pertinent Medications: MEDICATIONS  (STANDING):  BACItracin   Ointment 1 Application(s) Topical three times a day  BACItracin   Ointment 1 Application(s) Topical two times a day  chlorhexidine 4% Liquid 1 Application(s) Topical <User Schedule>  heparin  Injectable 5000 Unit(s) SubCutaneous every 12 hours  melatonin 3 milliGRAM(s) Oral at bedtime  pantoprazole   Suspension 40 milliGRAM(s) Enteral Tube daily  sucralfate suspension 1 Gram(s) Oral four times a day    MEDICATIONS  (PRN):  acetaminophen   Tablet .. 650 milliGRAM(s) Oral every 6 hours PRN Temp greater or equal to 38C (100.4F), Moderate Pain (4 - 6)  haloperidol    Injectable 5 milliGRAM(s) IV Push every 4 hours PRN agitation    Pertinent Labs: 07-04 Na 138 mmol/L Glu 95 mg/dL K+ 3.9 mmol/L Cr 0.84 mg/dL BUN 10 mg/dL Phos 3.4 mg/dL Alb 3.2(6/29)   PAB n/a   Hgb 13.9 g/dL Hct 43.4 % HgA1C n/a     24Hr FS:  Skin: intact    Estimated Needs:   [ ] no change since previous assessment (6/30/19)  [ ] recalculated:     Previous Nutrition Diagnosis: [x ] Malnutrition: severe malnutrition in context of acute illness    Related to: alcohol withdrawal, DTs  As evidenced by: <50 % nutrition needs x 14 days hospitalization; 3.4% wt. loss in >1 week(10 days)    Goal: pt to consume >75% of protein- energy needs via oral or enteral feeding;  met     Nutrition Diagnosis is [ ] ongoing   [ ] resolved  [x ] improved  [ ] not applicable        New Nutrition Diagnosis: [x ] not applicable       Interventions:   Recommend  [x ] Continue: Jevity 1.2 @ 55ml/hr via [id number], 1584kcal & 73gm protein  [ ] Change Diet To:  [ ] Nutrition Supplement:  [ ] Nutrition Support:  [x ] Other: consider laxative due to no BM x 14 days    Monitoring and Evaluation:   [ ] PO intake [ x ] Tolerance to diet prescription [ x ] weights [ x ] labs[ x ] follow up per protocol  [ ] other:

## 2019-07-08 NOTE — SWALLOW BEDSIDE ASSESSMENT ADULT - SLP PERTINENT HISTORY OF CURRENT PROBLEM
55 yo m with pmh of alcohol abuse & pud presents in ED c/o shakiness, bloody vomit and black "hard" stool the last few days. pt reports he has not drank in 2 days. No fever/chills, No photophobia/eye pain/changes in vision, No ear pain/sore throat/dysphagia, No chest pain/palpitations, no SOB/cough/wheeze/stridor, No abdominal pain, No D, no dysuria/frequency/discharge, No neck/back pain, no rash, no changes in neurological status/function. he was admitted 2 weeks ago for the same problem & had endoscopy that showed ESOPHAGUS: Mild narrowing in UES, Superficial ulceration / esophagitis lower 1/2 of esophagus s/p biopsy. STOMACH: mild antral gastritis s/p biopsy DUODENUM: WNL and was discharged on a ppi and carfate but continued to drink and only stopped 2 days ago

## 2019-07-22 DIAGNOSIS — F10.230 ALCOHOL DEPENDENCE WITH WITHDRAWAL, UNCOMPLICATED: ICD-10-CM

## 2019-07-22 DIAGNOSIS — F10.232 ALCOHOL DEPENDENCE WITH WITHDRAWAL WITH PERCEPTUAL DISTURBANCE: ICD-10-CM

## 2019-07-22 DIAGNOSIS — E83.39 OTHER DISORDERS OF PHOSPHORUS METABOLISM: ICD-10-CM

## 2019-07-22 DIAGNOSIS — F10.231 ALCOHOL DEPENDENCE WITH WITHDRAWAL DELIRIUM: ICD-10-CM

## 2019-07-22 DIAGNOSIS — R45.1 RESTLESSNESS AND AGITATION: ICD-10-CM

## 2019-07-22 DIAGNOSIS — E87.6 HYPOKALEMIA: ICD-10-CM

## 2019-07-22 DIAGNOSIS — K21.0 GASTRO-ESOPHAGEAL REFLUX DISEASE WITH ESOPHAGITIS: ICD-10-CM

## 2019-07-22 DIAGNOSIS — K22.10 ULCER OF ESOPHAGUS WITHOUT BLEEDING: ICD-10-CM

## 2019-07-22 DIAGNOSIS — E43 UNSPECIFIED SEVERE PROTEIN-CALORIE MALNUTRITION: ICD-10-CM

## 2019-07-22 DIAGNOSIS — K29.70 GASTRITIS, UNSPECIFIED, WITHOUT BLEEDING: ICD-10-CM

## 2019-07-22 DIAGNOSIS — E78.2 MIXED HYPERLIPIDEMIA: ICD-10-CM

## 2019-08-10 ENCOUNTER — EMERGENCY (EMERGENCY)
Facility: HOSPITAL | Age: 54
LOS: 0 days | Discharge: HOME | End: 2019-08-11
Attending: EMERGENCY MEDICINE
Payer: MEDICAID

## 2019-08-10 VITALS
OXYGEN SATURATION: 100 % | DIASTOLIC BLOOD PRESSURE: 84 MMHG | HEART RATE: 124 BPM | SYSTOLIC BLOOD PRESSURE: 117 MMHG | TEMPERATURE: 99 F | HEIGHT: 67 IN | RESPIRATION RATE: 19 BRPM | WEIGHT: 156.97 LBS

## 2019-08-10 DIAGNOSIS — F10.129 ALCOHOL ABUSE WITH INTOXICATION, UNSPECIFIED: ICD-10-CM

## 2019-08-10 DIAGNOSIS — R11.10 VOMITING, UNSPECIFIED: ICD-10-CM

## 2019-08-10 DIAGNOSIS — R10.9 UNSPECIFIED ABDOMINAL PAIN: ICD-10-CM

## 2019-08-10 LAB
ALBUMIN SERPL ELPH-MCNC: 4.1 G/DL — SIGNIFICANT CHANGE UP (ref 3.3–5)
ALP SERPL-CCNC: 56 U/L — SIGNIFICANT CHANGE UP (ref 40–120)
ALT FLD-CCNC: 47 U/L — SIGNIFICANT CHANGE UP (ref 12–78)
ANION GAP SERPL CALC-SCNC: 15 MMOL/L — SIGNIFICANT CHANGE UP (ref 5–17)
AST SERPL-CCNC: 37 U/L — SIGNIFICANT CHANGE UP (ref 15–37)
BASOPHILS # BLD AUTO: 0.04 K/UL — SIGNIFICANT CHANGE UP (ref 0–0.2)
BASOPHILS NFR BLD AUTO: 1.5 % — SIGNIFICANT CHANGE UP (ref 0–2)
BILIRUB SERPL-MCNC: 0.6 MG/DL — SIGNIFICANT CHANGE UP (ref 0.2–1.2)
BUN SERPL-MCNC: 12 MG/DL — SIGNIFICANT CHANGE UP (ref 7–23)
CALCIUM SERPL-MCNC: 8.9 MG/DL — SIGNIFICANT CHANGE UP (ref 8.5–10.1)
CHLORIDE SERPL-SCNC: 98 MMOL/L — SIGNIFICANT CHANGE UP (ref 96–108)
CO2 SERPL-SCNC: 27 MMOL/L — SIGNIFICANT CHANGE UP (ref 22–31)
CREAT SERPL-MCNC: 0.91 MG/DL — SIGNIFICANT CHANGE UP (ref 0.5–1.3)
EOSINOPHIL # BLD AUTO: 0.05 K/UL — SIGNIFICANT CHANGE UP (ref 0–0.5)
EOSINOPHIL NFR BLD AUTO: 1.8 % — SIGNIFICANT CHANGE UP (ref 0–6)
ETHANOL SERPL-MCNC: 233 MG/DL — HIGH (ref 0–10)
GLUCOSE SERPL-MCNC: 83 MG/DL — SIGNIFICANT CHANGE UP (ref 70–99)
HCT VFR BLD CALC: 43.6 % — SIGNIFICANT CHANGE UP (ref 39–50)
HGB BLD-MCNC: 14.5 G/DL — SIGNIFICANT CHANGE UP (ref 13–17)
IMM GRANULOCYTES NFR BLD AUTO: 0 % — SIGNIFICANT CHANGE UP (ref 0–1.5)
LYMPHOCYTES # BLD AUTO: 1.17 K/UL — SIGNIFICANT CHANGE UP (ref 1–3.3)
LYMPHOCYTES # BLD AUTO: 43.2 % — SIGNIFICANT CHANGE UP (ref 13–44)
MAGNESIUM SERPL-MCNC: 2.5 MG/DL — SIGNIFICANT CHANGE UP (ref 1.6–2.6)
MCHC RBC-ENTMCNC: 28.3 PG — SIGNIFICANT CHANGE UP (ref 27–34)
MCHC RBC-ENTMCNC: 33.3 GM/DL — SIGNIFICANT CHANGE UP (ref 32–36)
MCV RBC AUTO: 85 FL — SIGNIFICANT CHANGE UP (ref 80–100)
MONOCYTES # BLD AUTO: 0.25 K/UL — SIGNIFICANT CHANGE UP (ref 0–0.9)
MONOCYTES NFR BLD AUTO: 9.2 % — SIGNIFICANT CHANGE UP (ref 2–14)
NEUTROPHILS # BLD AUTO: 1.2 K/UL — LOW (ref 1.8–7.4)
NEUTROPHILS NFR BLD AUTO: 44.3 % — SIGNIFICANT CHANGE UP (ref 43–77)
NRBC # BLD: 0 /100 WBCS — SIGNIFICANT CHANGE UP (ref 0–0)
PLATELET # BLD AUTO: 227 K/UL — SIGNIFICANT CHANGE UP (ref 150–400)
POTASSIUM SERPL-MCNC: 3.5 MMOL/L — SIGNIFICANT CHANGE UP (ref 3.5–5.3)
POTASSIUM SERPL-SCNC: 3.5 MMOL/L — SIGNIFICANT CHANGE UP (ref 3.5–5.3)
PROT SERPL-MCNC: 8.9 GM/DL — HIGH (ref 6–8.3)
RBC # BLD: 5.13 M/UL — SIGNIFICANT CHANGE UP (ref 4.2–5.8)
RBC # FLD: 14 % — SIGNIFICANT CHANGE UP (ref 10.3–14.5)
SODIUM SERPL-SCNC: 140 MMOL/L — SIGNIFICANT CHANGE UP (ref 135–145)
WBC # BLD: 2.71 K/UL — LOW (ref 3.8–10.5)
WBC # FLD AUTO: 2.71 K/UL — LOW (ref 3.8–10.5)

## 2019-08-10 PROCEDURE — 99284 EMERGENCY DEPT VISIT MOD MDM: CPT

## 2019-08-10 PROCEDURE — 93010 ELECTROCARDIOGRAM REPORT: CPT

## 2019-08-10 RX ORDER — FAMOTIDINE 10 MG/ML
20 INJECTION INTRAVENOUS ONCE
Refills: 0 | Status: COMPLETED | OUTPATIENT
Start: 2019-08-10 | End: 2019-08-10

## 2019-08-10 RX ORDER — ACETAMINOPHEN 500 MG
975 TABLET ORAL ONCE
Refills: 0 | Status: COMPLETED | OUTPATIENT
Start: 2019-08-10 | End: 2019-08-10

## 2019-08-10 RX ORDER — SODIUM CHLORIDE 9 MG/ML
1000 INJECTION, SOLUTION INTRAVENOUS ONCE
Refills: 0 | Status: COMPLETED | OUTPATIENT
Start: 2019-08-10 | End: 2019-08-10

## 2019-08-10 RX ORDER — ONDANSETRON 8 MG/1
4 TABLET, FILM COATED ORAL ONCE
Refills: 0 | Status: COMPLETED | OUTPATIENT
Start: 2019-08-10 | End: 2019-08-10

## 2019-08-10 RX ORDER — KETOROLAC TROMETHAMINE 30 MG/ML
30 SYRINGE (ML) INJECTION ONCE
Refills: 0 | Status: DISCONTINUED | OUTPATIENT
Start: 2019-08-10 | End: 2019-08-10

## 2019-08-10 RX ORDER — MORPHINE SULFATE 50 MG/1
2 CAPSULE, EXTENDED RELEASE ORAL ONCE
Refills: 0 | Status: DISCONTINUED | OUTPATIENT
Start: 2019-08-10 | End: 2019-08-10

## 2019-08-10 RX ORDER — SODIUM CHLORIDE 9 MG/ML
1000 INJECTION, SOLUTION INTRAVENOUS
Refills: 0 | Status: COMPLETED | OUTPATIENT
Start: 2019-08-10 | End: 2019-08-10

## 2019-08-10 RX ADMIN — Medication 30 MILLIGRAM(S): at 21:21

## 2019-08-10 RX ADMIN — SODIUM CHLORIDE 1000 MILLILITER(S): 9 INJECTION, SOLUTION INTRAVENOUS at 20:45

## 2019-08-10 RX ADMIN — Medication 1 MILLIGRAM(S): at 22:04

## 2019-08-10 RX ADMIN — MORPHINE SULFATE 2 MILLIGRAM(S): 50 CAPSULE, EXTENDED RELEASE ORAL at 22:05

## 2019-08-10 RX ADMIN — FAMOTIDINE 20 MILLIGRAM(S): 10 INJECTION INTRAVENOUS at 22:05

## 2019-08-10 RX ADMIN — SODIUM CHLORIDE 500 MILLILITER(S): 9 INJECTION, SOLUTION INTRAVENOUS at 21:35

## 2019-08-10 RX ADMIN — Medication 30 MILLILITER(S): at 23:46

## 2019-08-10 RX ADMIN — ONDANSETRON 4 MILLIGRAM(S): 8 TABLET, FILM COATED ORAL at 21:59

## 2019-08-10 NOTE — ED ADULT TRIAGE NOTE - CHIEF COMPLAINT QUOTE
PW with mid ABD pain with burning associated with N/V x 2 days HX of PUD. PT drinks 1 bottle of Vodka everyday last drink this AM. PT additionally C/O of headache moderate tremors noted in ED to R hand , pt denies VH/AH PT A & O x 4 PW with generalized ABD pain and burning associated with N/V x 2 days HX of PUD. PT drinks 1 bottle of Vodka everyday last drink this AM. PT additionally C/O of headache, moderate tremors noted in ED to R hand , pt denies VH/AH

## 2019-08-10 NOTE — ED ADULT NURSE NOTE - CHIEF COMPLAINT QUOTE
PT A & O x 4 PW with generalized ABD pain and burning associated with N/V x 2 days HX of PUD. PT drinks 1 bottle of Vodka everyday last drink this AM. PT additionally C/O of headache, moderate tremors noted in ED to R hand , pt denies VH/AH

## 2019-08-11 VITALS
OXYGEN SATURATION: 80 % | RESPIRATION RATE: 19 BRPM | HEART RATE: 75 BPM | TEMPERATURE: 99 F | SYSTOLIC BLOOD PRESSURE: 139 MMHG | DIASTOLIC BLOOD PRESSURE: 72 MMHG

## 2019-08-11 RX ORDER — PANTOPRAZOLE SODIUM 20 MG/1
1 TABLET, DELAYED RELEASE ORAL
Qty: 14 | Refills: 0
Start: 2019-08-11 | End: 2019-08-24

## 2019-08-11 RX ORDER — MORPHINE SULFATE 50 MG/1
2 CAPSULE, EXTENDED RELEASE ORAL ONCE
Refills: 0 | Status: DISCONTINUED | OUTPATIENT
Start: 2019-08-11 | End: 2019-08-11

## 2019-08-11 RX ADMIN — MORPHINE SULFATE 2 MILLIGRAM(S): 50 CAPSULE, EXTENDED RELEASE ORAL at 00:32

## 2019-08-11 NOTE — ED PROVIDER NOTE - CLINICAL SUMMARY MEDICAL DECISION MAKING FREE TEXT BOX
pt pw epigatric burning and acute etoh intox. likely alcoholic gastritis. have counseled pt against drinking. will rx ppi.

## 2019-08-11 NOTE — ED PROVIDER NOTE - PHYSICAL EXAMINATION
Gen: Alert, intox,   Head: NC, AT   Eyes: PERRL, EOMI, normal lids/conjunctiva  ENT: normal hearing, patent oropharynx without erythema/exudate, uvula midline  Neck: supple, no tenderness, Trachea midline  Pulm: Bilateral BS, normal resp effort, no wheeze/stridor/retractions  CV: RRR, no M/R/G, 2+ radial and dp pulses bl, no edema  Abd: soft, NT/ND, +BS, no hepatosplenomegaly  Mskel: extremities x4 with normal ROM and no joint effusions. no ctl spine ttp.   Skin: no rash, no bruising   Neuro: AAOx3, no sensory/motor deficits, CN 2-12 intact

## 2019-08-11 NOTE — ED PROVIDER NOTE - OBJECTIVE STATEMENT
Pertinent PMH/PSH/FHx/SHx and Review of Systems contained within:  54m hx of chronic etoh abuse and prev gi bleed pw etoh intox. patient notes being on a 2 days binge. has been vomiting with it as well and thereafter developed epigastric burning. he has no bleeding, dark stools, cp, sob. he notes some lightheadedness. notes no fever, chills, rash, dysuria, testicular pain. has not taken anything to help with symptoms  Fh and Sh not otherwise contributory  ROS otherwise negative

## 2019-08-11 NOTE — ED PROVIDER NOTE - NSFOLLOWUPINSTRUCTIONS_ED_ALL_ED_FT
Stop drinking alcohol as this will contribute to your vomiting and stomach pain.    Take the acid suppression medication as prescribed.

## 2019-08-17 ENCOUNTER — INPATIENT (INPATIENT)
Facility: HOSPITAL | Age: 54
LOS: 1 days | Discharge: HOME | End: 2019-08-19
Attending: FAMILY MEDICINE | Admitting: FAMILY MEDICINE
Payer: MEDICAID

## 2019-08-17 VITALS
DIASTOLIC BLOOD PRESSURE: 94 MMHG | OXYGEN SATURATION: 97 % | HEIGHT: 67 IN | TEMPERATURE: 98 F | SYSTOLIC BLOOD PRESSURE: 146 MMHG | HEART RATE: 125 BPM | WEIGHT: 149.91 LBS | RESPIRATION RATE: 17 BRPM

## 2019-08-17 LAB
ALBUMIN SERPL ELPH-MCNC: 4.2 G/DL — SIGNIFICANT CHANGE UP (ref 3.3–5)
ALP SERPL-CCNC: 53 U/L — SIGNIFICANT CHANGE UP (ref 40–120)
ALT FLD-CCNC: 72 U/L — SIGNIFICANT CHANGE UP (ref 12–78)
ANION GAP SERPL CALC-SCNC: 18 MMOL/L — HIGH (ref 5–17)
APAP SERPL-MCNC: < 2 UG/ML (ref 10–30)
AST SERPL-CCNC: 75 U/L — HIGH (ref 15–37)
BASOPHILS # BLD AUTO: 0.04 K/UL — SIGNIFICANT CHANGE UP (ref 0–0.2)
BASOPHILS NFR BLD AUTO: 0.8 % — SIGNIFICANT CHANGE UP (ref 0–2)
BILIRUB SERPL-MCNC: 0.5 MG/DL — SIGNIFICANT CHANGE UP (ref 0.2–1.2)
BUN SERPL-MCNC: 10 MG/DL — SIGNIFICANT CHANGE UP (ref 7–23)
CALCIUM SERPL-MCNC: 9.1 MG/DL — SIGNIFICANT CHANGE UP (ref 8.5–10.1)
CHLORIDE SERPL-SCNC: 101 MMOL/L — SIGNIFICANT CHANGE UP (ref 96–108)
CO2 SERPL-SCNC: 22 MMOL/L — SIGNIFICANT CHANGE UP (ref 22–31)
CREAT SERPL-MCNC: 1.02 MG/DL — SIGNIFICANT CHANGE UP (ref 0.5–1.3)
EOSINOPHIL # BLD AUTO: 0.08 K/UL — SIGNIFICANT CHANGE UP (ref 0–0.5)
EOSINOPHIL NFR BLD AUTO: 1.5 % — SIGNIFICANT CHANGE UP (ref 0–6)
ETHANOL SERPL-MCNC: 214 MG/DL — HIGH (ref 0–10)
GLUCOSE SERPL-MCNC: 100 MG/DL — HIGH (ref 70–99)
HCT VFR BLD CALC: 45.3 % — SIGNIFICANT CHANGE UP (ref 39–50)
HGB BLD-MCNC: 14.9 G/DL — SIGNIFICANT CHANGE UP (ref 13–17)
IMM GRANULOCYTES NFR BLD AUTO: 0.2 % — SIGNIFICANT CHANGE UP (ref 0–1.5)
LACTATE SERPL-SCNC: 3.9 MMOL/L — HIGH (ref 0.7–2)
LACTATE SERPL-SCNC: 5.3 MMOL/L — CRITICAL HIGH (ref 0.7–2)
LIDOCAIN IGE QN: 205 U/L — SIGNIFICANT CHANGE UP (ref 73–393)
LYMPHOCYTES # BLD AUTO: 0.85 K/UL — LOW (ref 1–3.3)
LYMPHOCYTES # BLD AUTO: 16.1 % — SIGNIFICANT CHANGE UP (ref 13–44)
MCHC RBC-ENTMCNC: 28.5 PG — SIGNIFICANT CHANGE UP (ref 27–34)
MCHC RBC-ENTMCNC: 32.9 GM/DL — SIGNIFICANT CHANGE UP (ref 32–36)
MCV RBC AUTO: 86.6 FL — SIGNIFICANT CHANGE UP (ref 80–100)
MONOCYTES # BLD AUTO: 0.29 K/UL — SIGNIFICANT CHANGE UP (ref 0–0.9)
MONOCYTES NFR BLD AUTO: 5.5 % — SIGNIFICANT CHANGE UP (ref 2–14)
NEUTROPHILS # BLD AUTO: 4.01 K/UL — SIGNIFICANT CHANGE UP (ref 1.8–7.4)
NEUTROPHILS NFR BLD AUTO: 75.9 % — SIGNIFICANT CHANGE UP (ref 43–77)
NRBC # BLD: 0 /100 WBCS — SIGNIFICANT CHANGE UP (ref 0–0)
PLATELET # BLD AUTO: 191 K/UL — SIGNIFICANT CHANGE UP (ref 150–400)
POTASSIUM SERPL-MCNC: 4 MMOL/L — SIGNIFICANT CHANGE UP (ref 3.5–5.3)
POTASSIUM SERPL-SCNC: 4 MMOL/L — SIGNIFICANT CHANGE UP (ref 3.5–5.3)
PROT SERPL-MCNC: 9 GM/DL — HIGH (ref 6–8.3)
RBC # BLD: 5.23 M/UL — SIGNIFICANT CHANGE UP (ref 4.2–5.8)
RBC # FLD: 14 % — SIGNIFICANT CHANGE UP (ref 10.3–14.5)
SALICYLATES SERPL-MCNC: <1.7 MG/DL — LOW (ref 2.8–20)
SODIUM SERPL-SCNC: 141 MMOL/L — SIGNIFICANT CHANGE UP (ref 135–145)
WBC # BLD: 5.28 K/UL — SIGNIFICANT CHANGE UP (ref 3.8–10.5)
WBC # FLD AUTO: 5.28 K/UL — SIGNIFICANT CHANGE UP (ref 3.8–10.5)

## 2019-08-17 PROCEDURE — 99285 EMERGENCY DEPT VISIT HI MDM: CPT

## 2019-08-17 PROCEDURE — 74019 RADEX ABDOMEN 2 VIEWS: CPT | Mod: 26

## 2019-08-17 PROCEDURE — 74022 RADEX COMPL AQT ABD SERIES: CPT | Mod: 26

## 2019-08-17 PROCEDURE — 99223 1ST HOSP IP/OBS HIGH 75: CPT | Mod: AI

## 2019-08-17 PROCEDURE — 71045 X-RAY EXAM CHEST 1 VIEW: CPT | Mod: 26

## 2019-08-17 RX ORDER — PANTOPRAZOLE SODIUM 20 MG/1
40 TABLET, DELAYED RELEASE ORAL
Refills: 0 | Status: DISCONTINUED | OUTPATIENT
Start: 2019-08-17 | End: 2019-08-19

## 2019-08-17 RX ORDER — FOLIC ACID 0.8 MG
1 TABLET ORAL DAILY
Refills: 0 | Status: DISCONTINUED | OUTPATIENT
Start: 2019-08-17 | End: 2019-08-19

## 2019-08-17 RX ORDER — SODIUM CHLORIDE 9 MG/ML
1000 INJECTION, SOLUTION INTRAVENOUS
Refills: 0 | Status: COMPLETED | OUTPATIENT
Start: 2019-08-17 | End: 2019-08-17

## 2019-08-17 RX ORDER — SODIUM CHLORIDE 9 MG/ML
1000 INJECTION INTRAMUSCULAR; INTRAVENOUS; SUBCUTANEOUS ONCE
Refills: 0 | Status: COMPLETED | OUTPATIENT
Start: 2019-08-17 | End: 2019-08-17

## 2019-08-17 RX ORDER — SUCRALFATE 1 G
1 TABLET ORAL
Refills: 0 | Status: DISCONTINUED | OUTPATIENT
Start: 2019-08-17 | End: 2019-08-18

## 2019-08-17 RX ORDER — PANTOPRAZOLE SODIUM 20 MG/1
40 TABLET, DELAYED RELEASE ORAL ONCE
Refills: 0 | Status: COMPLETED | OUTPATIENT
Start: 2019-08-17 | End: 2019-08-17

## 2019-08-17 RX ORDER — ONDANSETRON 8 MG/1
8 TABLET, FILM COATED ORAL ONCE
Refills: 0 | Status: COMPLETED | OUTPATIENT
Start: 2019-08-17 | End: 2019-08-17

## 2019-08-17 RX ORDER — FAMOTIDINE 10 MG/ML
20 INJECTION INTRAVENOUS
Refills: 0 | Status: DISCONTINUED | OUTPATIENT
Start: 2019-08-17 | End: 2019-08-19

## 2019-08-17 RX ORDER — THIAMINE MONONITRATE (VIT B1) 100 MG
100 TABLET ORAL DAILY
Refills: 0 | Status: DISCONTINUED | OUTPATIENT
Start: 2019-08-17 | End: 2019-08-19

## 2019-08-17 RX ORDER — SODIUM CHLORIDE 9 MG/ML
2000 INJECTION INTRAMUSCULAR; INTRAVENOUS; SUBCUTANEOUS ONCE
Refills: 0 | Status: COMPLETED | OUTPATIENT
Start: 2019-08-17 | End: 2019-08-17

## 2019-08-17 RX ADMIN — PANTOPRAZOLE SODIUM 40 MILLIGRAM(S): 20 TABLET, DELAYED RELEASE ORAL at 21:04

## 2019-08-17 RX ADMIN — SODIUM CHLORIDE 1000 MILLILITER(S): 9 INJECTION INTRAMUSCULAR; INTRAVENOUS; SUBCUTANEOUS at 22:34

## 2019-08-17 RX ADMIN — SODIUM CHLORIDE 1000 MILLILITER(S): 9 INJECTION INTRAMUSCULAR; INTRAVENOUS; SUBCUTANEOUS at 23:50

## 2019-08-17 RX ADMIN — Medication 30 MILLILITER(S): at 22:16

## 2019-08-17 RX ADMIN — ONDANSETRON 8 MILLIGRAM(S): 8 TABLET, FILM COATED ORAL at 21:03

## 2019-08-17 RX ADMIN — SODIUM CHLORIDE 2000 MILLILITER(S): 9 INJECTION INTRAMUSCULAR; INTRAVENOUS; SUBCUTANEOUS at 21:05

## 2019-08-17 RX ADMIN — Medication 1 MILLIGRAM(S): at 21:14

## 2019-08-17 RX ADMIN — SODIUM CHLORIDE 200 MILLILITER(S): 9 INJECTION, SOLUTION INTRAVENOUS at 21:37

## 2019-08-17 RX ADMIN — SODIUM CHLORIDE 2000 MILLILITER(S): 9 INJECTION INTRAMUSCULAR; INTRAVENOUS; SUBCUTANEOUS at 23:50

## 2019-08-17 NOTE — ED ADULT TRIAGE NOTE - CHIEF COMPLAINT QUOTE
BIBA,last drink 2 days ago. accidental ingested peroxide instead of Maalox tyson to nausea and vomiting x days

## 2019-08-17 NOTE — ED PROVIDER NOTE - CARE PLAN
Principal Discharge DX:	Accidental ingestion of caustic alkali, initial encounter  Secondary Diagnosis:	Alcohol withdrawal  Secondary Diagnosis:	Gastritis

## 2019-08-17 NOTE — ED PROVIDER NOTE - PHYSICAL EXAMINATION
Gen: Alert, Well appearing. NAD    Head: NC, AT, PERRL, normal lids/conjunctiva   ENT: Bilateral TM WNL, patent oropharynx without erythema/exudate, uvula midline  Neck: supple, no tenderness/meningismus  Pulm: Bilateral clear BS, normal resp effort  CV: RRR, no M/R/G, +dist pulses   Abd: soft, ++ luq tenderness, ND, +BS, no guarding/rebound tenderness  Mskel: no edema/erythema/cyanosis   Skin: no rash, no bruising  Neuro: AAOx3, no sensory/motor deficits, CN 2-12 intact

## 2019-08-17 NOTE — H&P ADULT - HISTORY OF PRESENT ILLNESS
Pt is a 53 y/o male w/PMH gerd, HLD, etoh abuse presents with complaint of  abdominal pain and  nausea/vomiting.  Pt states he hasnt drank over the last 2 days as w/bad epigastric pain, and was taking mylanta.  today he had another bottle which he though was also mylanta and took about 2 tsp worth and realized it was hydrogen peroxide and had a episode of emesis after.  pt states the epigastric pain became worse and decides to seek care.    pt denies any fever, chills, sob, cp, palpitations, +n/+v/-d/-c no travels or sick contacts no hematemesis or melena.

## 2019-08-17 NOTE — ED PROVIDER NOTE - CLINICAL SUMMARY MEDICAL DECISION MAKING FREE TEXT BOX
pt nontoxic, well appearing, d/w poison control, states likely just exacerbates exisiting gastritis, just recommend abd xray. will admit. also of note, pt then c/o rt calf pain with walking x 3 weeks, wll add on sono for inpt to follow.

## 2019-08-17 NOTE — ED ADULT NURSE NOTE - NSIMPLEMENTINTERV_GEN_ALL_ED
Implemented All Fall Risk Interventions:  Fence to call system. Call bell, personal items and telephone within reach. Instruct patient to call for assistance. Room bathroom lighting operational. Non-slip footwear when patient is off stretcher. Physically safe environment: no spills, clutter or unnecessary equipment. Stretcher in lowest position, wheels locked, appropriate side rails in place. Provide visual cue, wrist band, yellow gown, etc. Monitor gait and stability. Monitor for mental status changes and reorient to person, place, and time. Review medications for side effects contributing to fall risk. Reinforce activity limits and safety measures with patient and family.

## 2019-08-17 NOTE — ED ADULT NURSE NOTE - OBJECTIVE STATEMENT
History of etoh presents w/ complaints of generalized abdominal pain associated w/ nausea and vomiting. Reports drinking hydrogen peroxide mistakenly. Drink alcoholic drink was 2 days ago. Endorses appetite loss.

## 2019-08-17 NOTE — H&P ADULT - ASSESSMENT
55 y/o male w/PMH gerd, HLD, etoh abuse presents with complaint of  abdominal pain and  nausea/vomiting, likely gastritis

## 2019-08-17 NOTE — H&P ADULT - NSHPPHYSICALEXAM_GEN_ALL_CORE
Vital Signs Last 24 Hrs  T(C): 36.7 (17 Aug 2019 20:16), Max: 36.7 (17 Aug 2019 20:16)  T(F): 98 (17 Aug 2019 20:16), Max: 98 (17 Aug 2019 20:16)  HR: 93 (17 Aug 2019 22:04) (93 - 125)  BP: 134/87 (17 Aug 2019 22:04) (134/87 - 146/94)  BP(mean): --  RR: 17 (17 Aug 2019 20:16) (17 - 17)  SpO2: 97% (17 Aug 2019 22:04) (97% - 97%)    PHYSICAL EXAM:    GENERAL: NAD, well-groomed, well-developed  HEAD:  Atraumatic, Normocephalic  EYES: EOMI, PERRLA, conjunctiva and sclera clear  ENMT: No tonsillar erythema, exudates, or enlargement; Moist mucous membranes, No lesions  NECK: Supple, No JVD, Normal thyroid  NERVOUS SYSTEM:  Alert & Oriented X3, Good concentration; Motor Strength 5/5 B/L upper and lower extremities; DTRs 2+ intact and symmetric  CHEST/LUNG: Clear to percussion bilaterally; No rales, rhonchi, wheezing, or rubs  HEART: Regular rate and rhythm; No rubs, or gallops, +S1,S2  ABDOMEN: +epigastric tenderness, +bs, no rebound or guarding  EXTREMITIES:  2+ Peripheral Pulses, No clubbing, cyanosis, or edema  LYMPH: No cervical adenopathy  RECTAL: deferred  BREAST: No palpatble masses, skin no lesions   : deferred  SKIN: No rashes or lesions    IMPROVE VTE Individual Risk Assessment          RISK                                                          Points  [  ] Previous VTE                                                3  [  ] Thrombophilia                                             2  [  ] Lower limb paralysis                                    2        (unable to hold up >15 seconds)    [  ] Current Cancer                                             2         (within 6 months)  [  ] Immobilization > 24 hrs                              1  [  ] ICU/CCU stay > 24 hours                            1  [  ] Age > 60                                                    1  IMPROVE VTE Score ____0_____

## 2019-08-17 NOTE — H&P ADULT - NSHPLABSRESULTS_GEN_ALL_CORE
LABS:                        14.9   5.28  )-----------( 191      ( 17 Aug 2019 20:58 )             45.3     08-17    141  |  101  |  10  ----------------------------<  100<H>  4.0   |  22  |  1.02    Ca    9.1      17 Aug 2019 20:58    TPro  9.0<H>  /  Alb  4.2  /  TBili  0.5  /  DBili  x   /  AST  75<H>  /  ALT  72  /  AlkPhos  53  08-17        CAPILLARY BLOOD GLUCOSE          RADIOLOGY & ADDITIONAL TESTS:    Imaging Personally Reviewed:  [ X] YES  [ ] NO

## 2019-08-18 LAB
AMPHET UR-MCNC: NEGATIVE — SIGNIFICANT CHANGE UP
APPEARANCE UR: CLEAR — SIGNIFICANT CHANGE UP
BACTERIA # UR AUTO: NEGATIVE — SIGNIFICANT CHANGE UP
BARBITURATES UR SCN-MCNC: NEGATIVE — SIGNIFICANT CHANGE UP
BENZODIAZ UR-MCNC: NEGATIVE — SIGNIFICANT CHANGE UP
BILIRUB UR-MCNC: NEGATIVE — SIGNIFICANT CHANGE UP
COCAINE METAB.OTHER UR-MCNC: NEGATIVE — SIGNIFICANT CHANGE UP
COLOR SPEC: YELLOW — SIGNIFICANT CHANGE UP
DIFF PNL FLD: NEGATIVE — SIGNIFICANT CHANGE UP
EPI CELLS # UR: SIGNIFICANT CHANGE UP
GLUCOSE UR QL: NEGATIVE MG/DL — SIGNIFICANT CHANGE UP
KETONES UR-MCNC: ABNORMAL
LEUKOCYTE ESTERASE UR-ACNC: NEGATIVE — SIGNIFICANT CHANGE UP
METHADONE UR-MCNC: NEGATIVE — SIGNIFICANT CHANGE UP
NITRITE UR-MCNC: NEGATIVE — SIGNIFICANT CHANGE UP
OPIATES UR-MCNC: POSITIVE — SIGNIFICANT CHANGE UP
PCP SPEC-MCNC: SIGNIFICANT CHANGE UP
PCP UR-MCNC: NEGATIVE — SIGNIFICANT CHANGE UP
PH UR: 7 — SIGNIFICANT CHANGE UP (ref 5–8)
PROT UR-MCNC: 30 MG/DL
RBC CASTS # UR COMP ASSIST: SIGNIFICANT CHANGE UP /HPF (ref 0–4)
SP GR SPEC: 1.01 — SIGNIFICANT CHANGE UP (ref 1.01–1.02)
THC UR QL: NEGATIVE — SIGNIFICANT CHANGE UP
UROBILINOGEN FLD QL: NEGATIVE MG/DL — SIGNIFICANT CHANGE UP
WBC UR QL: SIGNIFICANT CHANGE UP

## 2019-08-18 PROCEDURE — 93010 ELECTROCARDIOGRAM REPORT: CPT

## 2019-08-18 PROCEDURE — 70360 X-RAY EXAM OF NECK: CPT | Mod: 26

## 2019-08-18 PROCEDURE — 71045 X-RAY EXAM CHEST 1 VIEW: CPT | Mod: 26

## 2019-08-18 PROCEDURE — 93970 EXTREMITY STUDY: CPT | Mod: 26

## 2019-08-18 PROCEDURE — 99233 SBSQ HOSP IP/OBS HIGH 50: CPT

## 2019-08-18 RX ORDER — SUCRALFATE 1 G
1 TABLET ORAL EVERY 6 HOURS
Refills: 0 | Status: DISCONTINUED | OUTPATIENT
Start: 2019-08-18 | End: 2019-08-19

## 2019-08-18 RX ADMIN — Medication 2 MILLIGRAM(S): at 00:17

## 2019-08-18 RX ADMIN — Medication 2 MILLIGRAM(S): at 02:22

## 2019-08-18 RX ADMIN — PANTOPRAZOLE SODIUM 40 MILLIGRAM(S): 20 TABLET, DELAYED RELEASE ORAL at 05:47

## 2019-08-18 RX ADMIN — Medication 1 GRAM(S): at 12:04

## 2019-08-18 RX ADMIN — FAMOTIDINE 20 MILLIGRAM(S): 10 INJECTION INTRAVENOUS at 05:47

## 2019-08-18 RX ADMIN — PANTOPRAZOLE SODIUM 40 MILLIGRAM(S): 20 TABLET, DELAYED RELEASE ORAL at 00:17

## 2019-08-18 RX ADMIN — Medication 2 MILLIGRAM(S): at 17:51

## 2019-08-18 RX ADMIN — Medication 2 MILLIGRAM(S): at 10:57

## 2019-08-18 RX ADMIN — Medication 2 MILLIGRAM(S): at 14:10

## 2019-08-18 RX ADMIN — Medication 1 GRAM(S): at 05:47

## 2019-08-18 RX ADMIN — Medication 1 GRAM(S): at 20:30

## 2019-08-18 RX ADMIN — Medication 1.5 MILLIGRAM(S): at 21:16

## 2019-08-18 RX ADMIN — Medication 1 TABLET(S): at 12:04

## 2019-08-18 RX ADMIN — Medication 1 MILLIGRAM(S): at 12:04

## 2019-08-18 RX ADMIN — FAMOTIDINE 20 MILLIGRAM(S): 10 INJECTION INTRAVENOUS at 17:51

## 2019-08-18 RX ADMIN — Medication 100 MILLIGRAM(S): at 12:04

## 2019-08-18 RX ADMIN — Medication 1 GRAM(S): at 17:51

## 2019-08-18 RX ADMIN — Medication 2 MILLIGRAM(S): at 06:07

## 2019-08-18 RX ADMIN — Medication 1 GRAM(S): at 00:17

## 2019-08-18 NOTE — PROGRESS NOTE ADULT - ASSESSMENT
53 y/o male w/PMH gerd, HLD, etoh abuse presents with complaint of  abdominal pain and  nausea/vomiting, likely gastritis  LE doppler - no DVT
Implemented All Universal Safety Interventions:  [place] to call system. Call bell, personal items and telephone within reach. Instruct patient to call for assistance. Room bathroom lighting operational. Non-slip footwear when patient is off stretcher. Physically safe environment: no spills, clutter or unnecessary equipment. Stretcher in lowest position, wheels locked, appropriate side rails in place.

## 2019-08-18 NOTE — PROGRESS NOTE ADULT - SUBJECTIVE AND OBJECTIVE BOX
CHIEF COMPLAINT/INTERVAL HISTORY:    Patient is a 54y old  Male who presents with a chief complaint of abdominal pain (17 Aug 2019 23:36)      HPI:  Pt is a 55 y/o male w/PMH gerd, HLD, etoh abuse presents with complaint of  abdominal pain and  nausea/vomiting.  Pt states he hasnt drank over the last 2 days as w/bad epigastric pain, and was taking mylanta.  today he had another bottle which he though was also mylanta and took about 2 tsp worth and realized it was hydrogen peroxide and had a episode of emesis after.  pt states the epigastric pain became worse and decides to seek care.    pt denies any fever, chills, sob, cp, palpitations, +n/+v/-d/-c no travels or sick contacts no hematemesis or melena. (17 Aug 2019 23:36)    Overnight issues  Feeling ok  No chest pain, SOB  No shaking  CIWA 5            SUBJECTIVE & OBJECTIVE: Pt seen and examined at bedside.   ROS:  CONSTITUTIONAL: No fever, weight loss, or fatigue  EYES: No eye pain, visual disturbances, or discharge  ENMT:  No difficulty hearing, tinnitus, vertigo; No sinus or throat pain  NECK: No pain or stiffness  RESPIRATORY: No cough, wheezing, chills or hemoptysis; No shortness of breath  CARDIOVASCULAR: No chest pain, palpitations, dizziness, or leg swelling  GASTROINTESTINAL: No abdominal or epigastric pain. No nausea, vomiting, or hematemesis; No diarrhea or constipation. No melena or hematochezia.  GENITOURINARY: No dysuria, frequency, hematuria, or incontinence  NEUROLOGICAL: No headaches, memory loss, loss of strength, numbness, or tremors  SKIN: No itching, burning, rashes, or lesions   LYMPH NODES: No enlarged glands  ENDOCRINE: No heat or cold intolerance; No hair loss  MUSCULOSKELETAL: No joint pain or swelling; No muscle, back, or extremity pain  PSYCHIATRIC: No depression, anxiety, mood swings, or difficulty sleeping  HEME/LYMPH: No easy bruising, or bleeding gums  ALLERGY AND IMMUNOLOGIC: No hives or eczema  ICU Vital Signs Last 24 Hrs  T(C): 36.9 (18 Aug 2019 12:10), Max: 37.1 (18 Aug 2019 01:21)  T(F): 98.5 (18 Aug 2019 12:10), Max: 98.7 (18 Aug 2019 01:21)  HR: 85 (18 Aug 2019 12:10) (85 - 125)  BP: 146/97 (18 Aug 2019 12:10) (122/66 - 146/97)  BP(mean): --  ABP: --  ABP(mean): --  RR: 18 (18 Aug 2019 12:10) (17 - 20)  SpO2: 98% (18 Aug 2019 12:10) (97% - 98%)        MEDICATIONS  (STANDING):  famotidine Injectable 20 milliGRAM(s) IV Push two times a day  folic acid 1 milliGRAM(s) Oral daily  LORazepam   Injectable 2 milliGRAM(s) IV Push every 4 hours  LORazepam   Injectable   IV Push   LORazepam   Injectable 1.5 milliGRAM(s) IV Push every 4 hours  multivitamin 1 Tablet(s) Oral daily  pantoprazole    Tablet 40 milliGRAM(s) Oral before breakfast  sucralfate 1 Gram(s) Oral four times a day  thiamine 100 milliGRAM(s) Oral daily    MEDICATIONS  (PRN):  LORazepam   Injectable 2 milliGRAM(s) IV Push every 1 hour PRN CIWA-Ar score 8 or greater        PHYSICAL EXAM:    GENERAL: NAD, well-groomed, well-developed  HEAD:  Atraumatic, Normocephalic  EYES: EOMI, PERRLA, conjunctiva and sclera clear  ENMT: Moist mucous membranes  NECK: Supple, No JVD  NERVOUS SYSTEM:  Alert & Oriented X3, Moving all 4 limbs  CHEST/LUNG: Clear to auscultation bilaterally; No rales, rhonchi, wheezing, or rubs  HEART: Regular rate and rhythm; No murmurs, rubs, or gallops  ABDOMEN: Soft, Nontender, Nondistended; Bowel sounds present  EXTREMITIES:  2+ Peripheral Pulses, No clubbing, cyanosis, or edema    LABS:                        14.9   5.28  )-----------( 191      ( 17 Aug 2019 20:58 )             45.3     08-17    141  |  101  |  10  ----------------------------<  100<H>  4.0   |  22  |  1.02    Ca    9.1      17 Aug 2019 20:58    TPro  9.0<H>  /  Alb  4.2  /  TBili  0.5  /  DBili  x   /  AST  75<H>  /  ALT  72  /  AlkPhos  53  08-17      Urinalysis Basic - ( 18 Aug 2019 00:41 )    Color: Yellow / Appearance: Clear / S.010 / pH: x  Gluc: x / Ketone: Small  / Bili: Negative / Urobili: Negative mg/dL   Blood: x / Protein: 30 mg/dL / Nitrite: Negative   Leuk Esterase: Negative / RBC: 0-2 /HPF / WBC 0-2   Sq Epi: x / Non Sq Epi: Occasional / Bacteria: Negative        CAPILLARY BLOOD GLUCOSE          RECENT CULTURES:      RADIOLOGY & ADDITIONAL TESTS:  Imaging Personally Reviewed:  [ ] YES      Consultant(s) Notes Reviewed:  [ ] YES     Care Discussed with [ ] Consultants [X ] Patient [ ] Family  [ ]    [x ]  Other; RN  HEALTH ISSUES - PROBLEM Dx:        DVT/GI ppx  Discussed with pt @ bedside

## 2019-08-19 ENCOUNTER — TRANSCRIPTION ENCOUNTER (OUTPATIENT)
Age: 54
End: 2019-08-19

## 2019-08-19 VITALS
HEART RATE: 74 BPM | OXYGEN SATURATION: 100 % | SYSTOLIC BLOOD PRESSURE: 140 MMHG | TEMPERATURE: 97 F | DIASTOLIC BLOOD PRESSURE: 87 MMHG | RESPIRATION RATE: 17 BRPM

## 2019-08-19 LAB
ANION GAP SERPL CALC-SCNC: 9 MMOL/L — SIGNIFICANT CHANGE UP (ref 5–17)
BUN SERPL-MCNC: 5 MG/DL — LOW (ref 7–23)
CALCIUM SERPL-MCNC: 8.5 MG/DL — SIGNIFICANT CHANGE UP (ref 8.5–10.1)
CHLORIDE SERPL-SCNC: 107 MMOL/L — SIGNIFICANT CHANGE UP (ref 96–108)
CO2 SERPL-SCNC: 24 MMOL/L — SIGNIFICANT CHANGE UP (ref 22–31)
CREAT SERPL-MCNC: 0.76 MG/DL — SIGNIFICANT CHANGE UP (ref 0.5–1.3)
CULTURE RESULTS: NO GROWTH — SIGNIFICANT CHANGE UP
GLUCOSE SERPL-MCNC: 93 MG/DL — SIGNIFICANT CHANGE UP (ref 70–99)
HCT VFR BLD CALC: 38.7 % — LOW (ref 39–50)
HGB BLD-MCNC: 12.7 G/DL — LOW (ref 13–17)
MAGNESIUM SERPL-MCNC: 2.3 MG/DL — SIGNIFICANT CHANGE UP (ref 1.6–2.6)
MCHC RBC-ENTMCNC: 28.9 PG — SIGNIFICANT CHANGE UP (ref 27–34)
MCHC RBC-ENTMCNC: 32.8 GM/DL — SIGNIFICANT CHANGE UP (ref 32–36)
MCV RBC AUTO: 88 FL — SIGNIFICANT CHANGE UP (ref 80–100)
NRBC # BLD: 0 /100 WBCS — SIGNIFICANT CHANGE UP (ref 0–0)
PHOSPHATE SERPL-MCNC: 2.3 MG/DL — LOW (ref 2.5–4.5)
PLATELET # BLD AUTO: 155 K/UL — SIGNIFICANT CHANGE UP (ref 150–400)
POTASSIUM SERPL-MCNC: 3.8 MMOL/L — SIGNIFICANT CHANGE UP (ref 3.5–5.3)
POTASSIUM SERPL-SCNC: 3.8 MMOL/L — SIGNIFICANT CHANGE UP (ref 3.5–5.3)
RBC # BLD: 4.4 M/UL — SIGNIFICANT CHANGE UP (ref 4.2–5.8)
RBC # FLD: 13.2 % — SIGNIFICANT CHANGE UP (ref 10.3–14.5)
SODIUM SERPL-SCNC: 140 MMOL/L — SIGNIFICANT CHANGE UP (ref 135–145)
SPECIMEN SOURCE: SIGNIFICANT CHANGE UP
WBC # BLD: 2.81 K/UL — LOW (ref 3.8–10.5)
WBC # FLD AUTO: 2.81 K/UL — LOW (ref 3.8–10.5)

## 2019-08-19 PROCEDURE — 99238 HOSP IP/OBS DSCHRG MGMT 30/<: CPT

## 2019-08-19 RX ORDER — PANTOPRAZOLE SODIUM 20 MG/1
40 TABLET, DELAYED RELEASE ORAL ONCE
Refills: 0 | Status: COMPLETED | OUTPATIENT
Start: 2019-08-19 | End: 2019-08-19

## 2019-08-19 RX ADMIN — Medication 1.5 MILLIGRAM(S): at 06:35

## 2019-08-19 RX ADMIN — Medication 1 GRAM(S): at 11:06

## 2019-08-19 RX ADMIN — Medication 1 TABLET(S): at 11:06

## 2019-08-19 RX ADMIN — Medication 1 GRAM(S): at 06:37

## 2019-08-19 RX ADMIN — FAMOTIDINE 20 MILLIGRAM(S): 10 INJECTION INTRAVENOUS at 06:49

## 2019-08-19 RX ADMIN — Medication 100 MILLIGRAM(S): at 11:08

## 2019-08-19 RX ADMIN — Medication 1 GRAM(S): at 06:36

## 2019-08-19 RX ADMIN — Medication 1.5 MILLIGRAM(S): at 02:37

## 2019-08-19 RX ADMIN — Medication 1.5 MILLIGRAM(S): at 13:59

## 2019-08-19 RX ADMIN — Medication 1 MILLIGRAM(S): at 11:06

## 2019-08-19 RX ADMIN — PANTOPRAZOLE SODIUM 40 MILLIGRAM(S): 20 TABLET, DELAYED RELEASE ORAL at 09:13

## 2019-08-19 RX ADMIN — Medication 1.5 MILLIGRAM(S): at 11:06

## 2019-08-19 NOTE — DISCHARGE NOTE PROVIDER - HOSPITAL COURSE
55 y/o male w/PMH gerd, HLD, etoh abuse presents with complaint of  abdominal pain and  nausea/vomiting, likely gastritis    LE doppler - no DVT         Problem/Plan - 1:    ·  Problem: Alcohol withdrawal syndrome without complication.  Plan: ciwa- 2     Continue mvi/folate/thiamine    urged for cessation.          Problem/Plan - 2:    ·  Problem: Acute alcoholic gastritis with hemorrhage.  Plan: carafate    PPI         pt wants to go home. follow out pt with pmd

## 2019-08-19 NOTE — DISCHARGE NOTE NURSING/CASE MANAGEMENT/SOCIAL WORK - NSDCDPATPORTLINK_GEN_ALL_CORE
You can access the [facility], offered by [facility], by registering with the following website: [email or web address]

## 2019-08-19 NOTE — DISCHARGE NOTE PROVIDER - NSDCCPCAREPLAN_GEN_ALL_CORE_FT
PRINCIPAL DISCHARGE DIAGNOSIS  Diagnosis: Accidental ingestion of caustic alkali, initial encounter  Assessment and Plan of Treatment:       SECONDARY DISCHARGE DIAGNOSES  Diagnosis: Gastritis  Assessment and Plan of Treatment:     Diagnosis: Alcohol withdrawal  Assessment and Plan of Treatment:

## 2019-08-19 NOTE — SWALLOW BEDSIDE ASSESSMENT ADULT - SLP PERTINENT HISTORY OF CURRENT PROBLEM
noted in documentation  8/18/2019 pt started choking on  6pm carafate that was cut in half and put into applesauce, pt suctioned, and NP ordered xray, will continue to monitor. pt started on liquid carafate

## 2019-08-19 NOTE — SWALLOW BEDSIDE ASSESSMENT ADULT - COMMENTS
8/17/2019 CXR IMPRESSION:   No evidence of active chest disease.    8/18/2019 CXR IMPRESSION: No active pulmonary disease.

## 2019-08-19 NOTE — SWALLOW BEDSIDE ASSESSMENT ADULT - H & P REVIEW
yes/Pt is a 53 y/o male w/PMH gerd, HLD, etoh abuse presents with complaint of  abdominal pain and  nausea/vomiting.  Pt states he hasnt drank over the last 2 days as w/bad epigastric pain, and was taking mylanta.  today he had another bottle which he though was also mylanta and took about 2 tsp worth and realized it was hydrogen peroxide and had a episode of emesis after.  pt states the epigastric pain became worse and decides to seek care   pt denies any fever, chills, sob, cp, palpitations, +n/+v/-d/-c no travels or sick contacts no hematemesis or melena. (17 Aug 2019 23:36)

## 2019-08-19 NOTE — SWALLOW BEDSIDE ASSESSMENT ADULT - SWALLOW EVAL: RECOMMENDED FEEDING/EATING TECHNIQUES
small sips/bites/crush medication (when feasible)/maintain upright posture during/after eating for 30 mins

## 2019-08-23 DIAGNOSIS — K21.9 GASTRO-ESOPHAGEAL REFLUX DISEASE WITHOUT ESOPHAGITIS: ICD-10-CM

## 2019-08-23 DIAGNOSIS — Z71.41 ALCOHOL ABUSE COUNSELING AND SURVEILLANCE OF ALCOHOLIC: ICD-10-CM

## 2019-08-23 DIAGNOSIS — F10.230 ALCOHOL DEPENDENCE WITH WITHDRAWAL, UNCOMPLICATED: ICD-10-CM

## 2019-08-23 DIAGNOSIS — E78.2 MIXED HYPERLIPIDEMIA: ICD-10-CM

## 2019-08-23 DIAGNOSIS — K29.01 ACUTE GASTRITIS WITH BLEEDING: ICD-10-CM

## 2019-08-23 DIAGNOSIS — T54.3X1A TOXIC EFFECT OF CORROSIVE ALKALIS AND ALKALI-LIKE SUBSTANCES, ACCIDENTAL (UNINTENTIONAL), INITIAL ENCOUNTER: ICD-10-CM
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
## 2019-09-27 ENCOUNTER — INPATIENT (INPATIENT)
Facility: HOSPITAL | Age: 54
LOS: 0 days | Discharge: HOME | End: 2019-09-28
Attending: FAMILY MEDICINE | Admitting: FAMILY MEDICINE
Payer: MEDICAID

## 2019-09-27 VITALS
OXYGEN SATURATION: 98 % | WEIGHT: 169.98 LBS | TEMPERATURE: 98 F | HEIGHT: 68 IN | DIASTOLIC BLOOD PRESSURE: 81 MMHG | HEART RATE: 140 BPM | RESPIRATION RATE: 22 BRPM | SYSTOLIC BLOOD PRESSURE: 131 MMHG

## 2019-09-27 DIAGNOSIS — I10 ESSENTIAL (PRIMARY) HYPERTENSION: ICD-10-CM

## 2019-09-27 DIAGNOSIS — E78.2 MIXED HYPERLIPIDEMIA: ICD-10-CM

## 2019-09-27 DIAGNOSIS — K29.20 ALCOHOLIC GASTRITIS WITHOUT BLEEDING: ICD-10-CM

## 2019-09-27 DIAGNOSIS — E55.9 VITAMIN D DEFICIENCY, UNSPECIFIED: ICD-10-CM

## 2019-09-27 DIAGNOSIS — R11.2 NAUSEA WITH VOMITING, UNSPECIFIED: ICD-10-CM

## 2019-09-27 LAB
ALBUMIN SERPL ELPH-MCNC: 4.3 G/DL — SIGNIFICANT CHANGE UP (ref 3.3–5)
ALP SERPL-CCNC: 50 U/L — SIGNIFICANT CHANGE UP (ref 40–120)
ALT FLD-CCNC: 36 U/L — SIGNIFICANT CHANGE UP (ref 12–78)
ANION GAP SERPL CALC-SCNC: 21 MMOL/L — HIGH (ref 5–17)
APAP SERPL-MCNC: < 2 UG/ML (ref 10–30)
AST SERPL-CCNC: 39 U/L — HIGH (ref 15–37)
BASOPHILS # BLD AUTO: 0.06 K/UL — SIGNIFICANT CHANGE UP (ref 0–0.2)
BASOPHILS NFR BLD AUTO: 0.7 % — SIGNIFICANT CHANGE UP (ref 0–2)
BILIRUB SERPL-MCNC: 0.9 MG/DL — SIGNIFICANT CHANGE UP (ref 0.2–1.2)
BUN SERPL-MCNC: 14 MG/DL — SIGNIFICANT CHANGE UP (ref 7–23)
CALCIUM SERPL-MCNC: 10.3 MG/DL — HIGH (ref 8.5–10.1)
CHLORIDE SERPL-SCNC: 97 MMOL/L — SIGNIFICANT CHANGE UP (ref 96–108)
CO2 SERPL-SCNC: 24 MMOL/L — SIGNIFICANT CHANGE UP (ref 22–31)
CREAT SERPL-MCNC: 1.17 MG/DL — SIGNIFICANT CHANGE UP (ref 0.5–1.3)
EOSINOPHIL # BLD AUTO: 0.01 K/UL — SIGNIFICANT CHANGE UP (ref 0–0.5)
EOSINOPHIL NFR BLD AUTO: 0.1 % — SIGNIFICANT CHANGE UP (ref 0–6)
ETHANOL SERPL-MCNC: 161 MG/DL — HIGH (ref 0–10)
GLUCOSE SERPL-MCNC: 116 MG/DL — HIGH (ref 70–99)
HCT VFR BLD CALC: 47.8 % — SIGNIFICANT CHANGE UP (ref 39–50)
HGB BLD-MCNC: 16 G/DL — SIGNIFICANT CHANGE UP (ref 13–17)
IMM GRANULOCYTES NFR BLD AUTO: 0.3 % — SIGNIFICANT CHANGE UP (ref 0–1.5)
LIDOCAIN IGE QN: 189 U/L — SIGNIFICANT CHANGE UP (ref 73–393)
LYMPHOCYTES # BLD AUTO: 2.19 K/UL — SIGNIFICANT CHANGE UP (ref 1–3.3)
LYMPHOCYTES # BLD AUTO: 25.1 % — SIGNIFICANT CHANGE UP (ref 13–44)
MAGNESIUM SERPL-MCNC: 1.7 MG/DL — SIGNIFICANT CHANGE UP (ref 1.6–2.6)
MCHC RBC-ENTMCNC: 28.3 PG — SIGNIFICANT CHANGE UP (ref 27–34)
MCHC RBC-ENTMCNC: 33.5 GM/DL — SIGNIFICANT CHANGE UP (ref 32–36)
MCV RBC AUTO: 84.6 FL — SIGNIFICANT CHANGE UP (ref 80–100)
MONOCYTES # BLD AUTO: 0.4 K/UL — SIGNIFICANT CHANGE UP (ref 0–0.9)
MONOCYTES NFR BLD AUTO: 4.6 % — SIGNIFICANT CHANGE UP (ref 2–14)
NEUTROPHILS # BLD AUTO: 6.03 K/UL — SIGNIFICANT CHANGE UP (ref 1.8–7.4)
NEUTROPHILS NFR BLD AUTO: 69.2 % — SIGNIFICANT CHANGE UP (ref 43–77)
NRBC # BLD: 0 /100 WBCS — SIGNIFICANT CHANGE UP (ref 0–0)
PHOSPHATE SERPL-MCNC: 3.5 MG/DL — SIGNIFICANT CHANGE UP (ref 2.5–4.5)
PLATELET # BLD AUTO: 269 K/UL — SIGNIFICANT CHANGE UP (ref 150–400)
POTASSIUM SERPL-MCNC: 3.7 MMOL/L — SIGNIFICANT CHANGE UP (ref 3.5–5.3)
POTASSIUM SERPL-SCNC: 3.7 MMOL/L — SIGNIFICANT CHANGE UP (ref 3.5–5.3)
PROT SERPL-MCNC: 8.9 GM/DL — HIGH (ref 6–8.3)
RBC # BLD: 5.65 M/UL — SIGNIFICANT CHANGE UP (ref 4.2–5.8)
RBC # FLD: 12.8 % — SIGNIFICANT CHANGE UP (ref 10.3–14.5)
SALICYLATES SERPL-MCNC: <1.7 MG/DL — LOW (ref 2.8–20)
SODIUM SERPL-SCNC: 142 MMOL/L — SIGNIFICANT CHANGE UP (ref 135–145)
WBC # BLD: 8.72 K/UL — SIGNIFICANT CHANGE UP (ref 3.8–10.5)
WBC # FLD AUTO: 8.72 K/UL — SIGNIFICANT CHANGE UP (ref 3.8–10.5)

## 2019-09-27 PROCEDURE — 93010 ELECTROCARDIOGRAM REPORT: CPT

## 2019-09-27 PROCEDURE — 99285 EMERGENCY DEPT VISIT HI MDM: CPT

## 2019-09-27 PROCEDURE — 99223 1ST HOSP IP/OBS HIGH 75: CPT

## 2019-09-27 RX ORDER — MORPHINE SULFATE 50 MG/1
2 CAPSULE, EXTENDED RELEASE ORAL EVERY 6 HOURS
Refills: 0 | Status: DISCONTINUED | OUTPATIENT
Start: 2019-09-27 | End: 2019-09-28

## 2019-09-27 RX ORDER — ACETAMINOPHEN 500 MG
650 TABLET ORAL EVERY 4 HOURS
Refills: 0 | Status: DISCONTINUED | OUTPATIENT
Start: 2019-09-27 | End: 2019-09-28

## 2019-09-27 RX ORDER — BENZOCAINE AND MENTHOL 5; 1 G/100ML; G/100ML
1 LIQUID ORAL
Refills: 0 | Status: DISCONTINUED | OUTPATIENT
Start: 2019-09-27 | End: 2019-09-28

## 2019-09-27 RX ORDER — ONDANSETRON 8 MG/1
4 TABLET, FILM COATED ORAL EVERY 6 HOURS
Refills: 0 | Status: DISCONTINUED | OUTPATIENT
Start: 2019-09-28 | End: 2019-09-28

## 2019-09-27 RX ORDER — ONDANSETRON 8 MG/1
8 TABLET, FILM COATED ORAL ONCE
Refills: 0 | Status: COMPLETED | OUTPATIENT
Start: 2019-09-27 | End: 2019-09-27

## 2019-09-27 RX ORDER — QUETIAPINE FUMARATE 200 MG/1
50 TABLET, FILM COATED ORAL AT BEDTIME
Refills: 0 | Status: DISCONTINUED | OUTPATIENT
Start: 2019-09-27 | End: 2019-09-28

## 2019-09-27 RX ORDER — PANTOPRAZOLE SODIUM 20 MG/1
40 TABLET, DELAYED RELEASE ORAL DAILY
Refills: 0 | Status: DISCONTINUED | OUTPATIENT
Start: 2019-09-27 | End: 2019-09-27

## 2019-09-27 RX ORDER — MORPHINE SULFATE 50 MG/1
4 CAPSULE, EXTENDED RELEASE ORAL ONCE
Refills: 0 | Status: DISCONTINUED | OUTPATIENT
Start: 2019-09-27 | End: 2019-09-27

## 2019-09-27 RX ORDER — SODIUM CHLORIDE 9 MG/ML
1000 INJECTION, SOLUTION INTRAVENOUS
Refills: 0 | Status: DISCONTINUED | OUTPATIENT
Start: 2019-09-27 | End: 2019-09-28

## 2019-09-27 RX ORDER — METOPROLOL TARTRATE 50 MG
25 TABLET ORAL
Refills: 0 | Status: DISCONTINUED | OUTPATIENT
Start: 2019-09-27 | End: 2019-09-28

## 2019-09-27 RX ORDER — SIMVASTATIN 20 MG/1
20 TABLET, FILM COATED ORAL AT BEDTIME
Refills: 0 | Status: DISCONTINUED | OUTPATIENT
Start: 2019-09-27 | End: 2019-09-28

## 2019-09-27 RX ORDER — FOLIC ACID 0.8 MG
1 TABLET ORAL DAILY
Refills: 0 | Status: DISCONTINUED | OUTPATIENT
Start: 2019-09-28 | End: 2019-09-28

## 2019-09-27 RX ORDER — SUCRALFATE 1 G
1 TABLET ORAL
Refills: 0 | Status: DISCONTINUED | OUTPATIENT
Start: 2019-09-27 | End: 2019-09-28

## 2019-09-27 RX ORDER — SODIUM CHLORIDE 9 MG/ML
1000 INJECTION, SOLUTION INTRAVENOUS
Refills: 0 | Status: COMPLETED | OUTPATIENT
Start: 2019-09-27 | End: 2019-09-27

## 2019-09-27 RX ORDER — SODIUM CHLORIDE 9 MG/ML
1000 INJECTION INTRAMUSCULAR; INTRAVENOUS; SUBCUTANEOUS ONCE
Refills: 0 | Status: COMPLETED | OUTPATIENT
Start: 2019-09-27 | End: 2019-09-27

## 2019-09-27 RX ORDER — THIAMINE MONONITRATE (VIT B1) 100 MG
100 TABLET ORAL DAILY
Refills: 0 | Status: DISCONTINUED | OUTPATIENT
Start: 2019-09-28 | End: 2019-09-28

## 2019-09-27 RX ORDER — OXYCODONE HYDROCHLORIDE 5 MG/1
5 TABLET ORAL EVERY 4 HOURS
Refills: 0 | Status: DISCONTINUED | OUTPATIENT
Start: 2019-09-27 | End: 2019-09-28

## 2019-09-27 RX ORDER — PANTOPRAZOLE SODIUM 20 MG/1
40 TABLET, DELAYED RELEASE ORAL
Refills: 0 | Status: DISCONTINUED | OUTPATIENT
Start: 2019-09-27 | End: 2019-09-28

## 2019-09-27 RX ORDER — ONDANSETRON 8 MG/1
4 TABLET, FILM COATED ORAL EVERY 4 HOURS
Refills: 0 | Status: COMPLETED | OUTPATIENT
Start: 2019-09-27 | End: 2019-09-28

## 2019-09-27 RX ORDER — SODIUM CHLORIDE 9 MG/ML
1000 INJECTION, SOLUTION INTRAVENOUS
Refills: 0 | Status: DISCONTINUED | OUTPATIENT
Start: 2019-09-27 | End: 2019-09-27

## 2019-09-27 RX ADMIN — QUETIAPINE FUMARATE 50 MILLIGRAM(S): 200 TABLET, FILM COATED ORAL at 21:25

## 2019-09-27 RX ADMIN — SIMVASTATIN 20 MILLIGRAM(S): 20 TABLET, FILM COATED ORAL at 21:25

## 2019-09-27 RX ADMIN — Medication 100 MILLIGRAM(S): at 16:04

## 2019-09-27 RX ADMIN — MORPHINE SULFATE 4 MILLIGRAM(S): 50 CAPSULE, EXTENDED RELEASE ORAL at 13:44

## 2019-09-27 RX ADMIN — Medication 1 GRAM(S): at 21:26

## 2019-09-27 RX ADMIN — Medication 2 MILLIGRAM(S): at 14:46

## 2019-09-27 RX ADMIN — Medication 25 MILLIGRAM(S): at 18:34

## 2019-09-27 RX ADMIN — SODIUM CHLORIDE 1000 MILLILITER(S): 9 INJECTION INTRAMUSCULAR; INTRAVENOUS; SUBCUTANEOUS at 13:40

## 2019-09-27 RX ADMIN — PANTOPRAZOLE SODIUM 40 MILLIGRAM(S): 20 TABLET, DELAYED RELEASE ORAL at 18:33

## 2019-09-27 RX ADMIN — ONDANSETRON 8 MILLIGRAM(S): 8 TABLET, FILM COATED ORAL at 13:41

## 2019-09-27 RX ADMIN — MORPHINE SULFATE 2 MILLIGRAM(S): 50 CAPSULE, EXTENDED RELEASE ORAL at 20:25

## 2019-09-27 RX ADMIN — ONDANSETRON 4 MILLIGRAM(S): 8 TABLET, FILM COATED ORAL at 18:33

## 2019-09-27 RX ADMIN — ONDANSETRON 4 MILLIGRAM(S): 8 TABLET, FILM COATED ORAL at 21:25

## 2019-09-27 RX ADMIN — Medication 1 GRAM(S): at 18:34

## 2019-09-27 RX ADMIN — SODIUM CHLORIDE 125 MILLILITER(S): 9 INJECTION, SOLUTION INTRAVENOUS at 16:36

## 2019-09-27 RX ADMIN — Medication 2 MILLIGRAM(S): at 18:09

## 2019-09-27 RX ADMIN — BENZOCAINE AND MENTHOL 1 LOZENGE: 5; 1 LIQUID ORAL at 19:05

## 2019-09-27 RX ADMIN — SODIUM CHLORIDE 200 MILLILITER(S): 9 INJECTION, SOLUTION INTRAVENOUS at 14:08

## 2019-09-27 NOTE — ED PROVIDER NOTE - CLINICAL SUMMARY MEDICAL DECISION MAKING FREE TEXT BOX
Patient still symptomatic despite medical intervention Patient still symptomatic despite medical intervention, will admit for symptom control

## 2019-09-27 NOTE — H&P ADULT - HISTORY OF PRESENT ILLNESS
55 y/o male with PMHx of Alcoholism with frequent admissions for withdrawal and other ETOH related conditions, essential HTN, Depression, and Hyperlipidemia.  Patient was recently admitted on 06/2019 for melena and hematemesis due to alcoholic erosive gastritis.  Patient states that he came to the ED for a 1 day history of persistent and recurrent vomiting.  He states that he has been drinking bottles of whisky and vodka for the last 3 days.  He states that his last drink was this morning and that he began to have abdominal pain and vomiting associated with his abdominal pain.  Patient describes his abdominal pain to be burning in nature, radiating to the throat, 8/10, constant, localized in the epigastric area, and worsened by vomiting.  Patient states that he has similar episodes in the past and that he has not been taking his medications over the last 3 days.  Recent relevant history, patient had EGD in 06/07/2019 which demonstrated the mild upper esophageal narrowing, and esophageal ulcer.  During this hospitalization and others, patient has required ICU care for Alcohol withdrawal requiring PRECEDEX/Phenobarbitol.      ED course  Patient having billious vomitus which is streaked with blood, complaining of abdominal pain  Tachycardic

## 2019-09-27 NOTE — H&P ADULT - NSHPPHYSICALEXAM_GEN_ALL_CORE
ICU Vital Signs Last 24 Hrs  T(C): 36.5 (27 Sep 2019 13:00), Max: 36.5 (27 Sep 2019 13:00)  T(F): 97.7 (27 Sep 2019 13:00), Max: 97.7 (27 Sep 2019 13:00)  HR: 88 (27 Sep 2019 16:06) (88 - 140)  BP: 127/71 (27 Sep 2019 16:06) (127/71 - 131/85)  RR: 20 (27 Sep 2019 16:06) (20 - 26)  SpO2: 97% (27 Sep 2019 16:06) (97% - 98%)

## 2019-09-27 NOTE — ED ADULT TRIAGE NOTE - CHIEF COMPLAINT QUOTE
ems states, " Pt has alcohol abuse with liver cirrhosis, drank vodka and whiskey" unable to obtain bp and temperature in triage pt vomiting excessively .

## 2019-09-27 NOTE — H&P ADULT - ASSESSMENT
53 y/o male admitted with abdominal pain due to intractable vomiting.     Admit to Telemetry  Keep NPO overnight  OOB with assistance  Fall risk precautions   CIWA protocol and precautions

## 2019-09-27 NOTE — ED ADULT NURSE NOTE - NSIMPLEMENTINTERV_GEN_ALL_ED
Implemented All Fall Risk Interventions:  [place] to call system. Call bell, personal items and telephone within reach. Instruct patient to call for assistance. Room bathroom lighting operational. Non-slip footwear when patient is off stretcher. Physically safe environment: no spills, clutter or unnecessary equipment. Stretcher in lowest position, wheels locked, appropriate side rails in place. Provide visual cue, wrist band, yellow gown, etc. Monitor gait and stability. Monitor for mental status changes and reorient to person, place, and time. Review medications for side effects contributing to fall risk. Reinforce activity limits and safety measures with patient and family.

## 2019-09-27 NOTE — ED PROVIDER NOTE - CARE PLAN
Principal Discharge DX:	Alcoholic intoxication with complication  Secondary Diagnosis:	Intractable vomiting with nausea, unspecified vomiting type

## 2019-09-27 NOTE — H&P ADULT - PROBLEM SELECTOR PLAN 4
- lopressor 25 mg BID with appropriate holds  - d/c lopressor if patient develops hematochezia, melena, or hematemesis  - may control BP with hydralazine in the case that lopressor needs to be discontinued.

## 2019-09-27 NOTE — H&P ADULT - PROBLEM SELECTOR PLAN 2
- Alcohol level 161  - given IVF with MVT, Thiamine, Folic acid x 2 doses in ED  - c/w MVT, Thiamine, and folic acid in am  - Carafate 1 GM Q 4 hours   - Protonix 40 mg IVP Q12 hours  - Social work evaluation   - CIWA protocol. This patient is high risk for DT  - Standing Librium Taper with Librium 50 mg Q 1 hour for symptom triggered CIWA > 7.  Patient must received Librium.  - ICU consult for CIWA > 15  - If worsening CIWA, patient will need phenobarbital  - serial electrolyte draws and aggressively replace K+, Phos, and Mg

## 2019-09-27 NOTE — ED ADULT NURSE REASSESSMENT NOTE - NS ED NURSE REASSESS COMMENT FT1
Pt continue to be monitored on CM in ED awaiting transfer to bed assigned. No tremors or seizure like activity noted in ED at this time.

## 2019-09-27 NOTE — PATIENT PROFILE ADULT - NSPROGENOTHERPROVIDER_GEN_A_NUR
none [Hip Pain] : hip pain [Change In Skin Texture] : change in skin texture [Toenail Deformity] : toenail deformity [Toenail Discoloration] : toenail discoloration [Toenail Thickening] : toenail thickening [Negative] : Constitutional [As Noted in HPI] : as noted in HPI

## 2019-09-28 ENCOUNTER — TRANSCRIPTION ENCOUNTER (OUTPATIENT)
Age: 54
End: 2019-09-28

## 2019-09-28 VITALS
HEART RATE: 78 BPM | OXYGEN SATURATION: 96 % | DIASTOLIC BLOOD PRESSURE: 83 MMHG | SYSTOLIC BLOOD PRESSURE: 127 MMHG | RESPIRATION RATE: 18 BRPM | TEMPERATURE: 100 F

## 2019-09-28 LAB
AMPHET UR-MCNC: NEGATIVE — SIGNIFICANT CHANGE UP
ANION GAP SERPL CALC-SCNC: 8 MMOL/L — SIGNIFICANT CHANGE UP (ref 5–17)
BARBITURATES UR SCN-MCNC: NEGATIVE — SIGNIFICANT CHANGE UP
BENZODIAZ UR-MCNC: POSITIVE — SIGNIFICANT CHANGE UP
BUN SERPL-MCNC: 13 MG/DL — SIGNIFICANT CHANGE UP (ref 7–23)
CALCIUM SERPL-MCNC: 8.9 MG/DL — SIGNIFICANT CHANGE UP (ref 8.5–10.1)
CHLORIDE SERPL-SCNC: 105 MMOL/L — SIGNIFICANT CHANGE UP (ref 96–108)
CO2 SERPL-SCNC: 30 MMOL/L — SIGNIFICANT CHANGE UP (ref 22–31)
COCAINE METAB.OTHER UR-MCNC: NEGATIVE — SIGNIFICANT CHANGE UP
CREAT SERPL-MCNC: 1 MG/DL — SIGNIFICANT CHANGE UP (ref 0.5–1.3)
GLUCOSE SERPL-MCNC: 86 MG/DL — SIGNIFICANT CHANGE UP (ref 70–99)
MAGNESIUM SERPL-MCNC: 1.6 MG/DL — SIGNIFICANT CHANGE UP (ref 1.6–2.6)
METHADONE UR-MCNC: NEGATIVE — SIGNIFICANT CHANGE UP
OPIATES UR-MCNC: POSITIVE — SIGNIFICANT CHANGE UP
PCP SPEC-MCNC: SIGNIFICANT CHANGE UP
PCP UR-MCNC: NEGATIVE — SIGNIFICANT CHANGE UP
PHOSPHATE SERPL-MCNC: 3 MG/DL — SIGNIFICANT CHANGE UP (ref 2.5–4.5)
POTASSIUM SERPL-MCNC: 3.3 MMOL/L — LOW (ref 3.5–5.3)
POTASSIUM SERPL-SCNC: 3.3 MMOL/L — LOW (ref 3.5–5.3)
SODIUM SERPL-SCNC: 143 MMOL/L — SIGNIFICANT CHANGE UP (ref 135–145)
THC UR QL: NEGATIVE — SIGNIFICANT CHANGE UP

## 2019-09-28 PROCEDURE — 99239 HOSP IP/OBS DSCHRG MGMT >30: CPT

## 2019-09-28 RX ORDER — FOLIC ACID 0.8 MG
1 TABLET ORAL
Qty: 30 | Refills: 0
Start: 2019-09-28 | End: 2019-10-27

## 2019-09-28 RX ORDER — CHOLECALCIFEROL (VITAMIN D3) 125 MCG
1 CAPSULE ORAL
Qty: 0 | Refills: 0 | DISCHARGE

## 2019-09-28 RX ORDER — POTASSIUM CHLORIDE 20 MEQ
40 PACKET (EA) ORAL ONCE
Refills: 0 | Status: COMPLETED | OUTPATIENT
Start: 2019-09-28 | End: 2019-09-28

## 2019-09-28 RX ORDER — QUETIAPINE FUMARATE 200 MG/1
1 TABLET, FILM COATED ORAL
Qty: 0 | Refills: 0 | DISCHARGE

## 2019-09-28 RX ORDER — SUCRALFATE 1 G
10 TABLET ORAL
Qty: 1000 | Refills: 0
Start: 2019-09-28 | End: 2019-10-27

## 2019-09-28 RX ORDER — SIMVASTATIN 20 MG/1
1 TABLET, FILM COATED ORAL
Qty: 30 | Refills: 0
Start: 2019-09-28 | End: 2019-10-27

## 2019-09-28 RX ORDER — METOPROLOL TARTRATE 50 MG
0.5 TABLET ORAL
Qty: 15 | Refills: 0
Start: 2019-09-28 | End: 2019-10-27

## 2019-09-28 RX ORDER — PANTOPRAZOLE SODIUM 20 MG/1
1 TABLET, DELAYED RELEASE ORAL
Qty: 14 | Refills: 0
Start: 2019-09-28 | End: 2019-10-11

## 2019-09-28 RX ORDER — QUETIAPINE FUMARATE 200 MG/1
1 TABLET, FILM COATED ORAL
Qty: 30 | Refills: 0
Start: 2019-09-28 | End: 2019-10-27

## 2019-09-28 RX ORDER — SIMVASTATIN 20 MG/1
1 TABLET, FILM COATED ORAL
Qty: 0 | Refills: 0 | DISCHARGE

## 2019-09-28 RX ADMIN — Medication 40 MILLIEQUIVALENT(S): at 13:56

## 2019-09-28 RX ADMIN — BENZOCAINE AND MENTHOL 1 LOZENGE: 5; 1 LIQUID ORAL at 18:43

## 2019-09-28 RX ADMIN — Medication 50 MILLIGRAM(S): at 00:39

## 2019-09-28 RX ADMIN — BENZOCAINE AND MENTHOL 1 LOZENGE: 5; 1 LIQUID ORAL at 06:34

## 2019-09-28 RX ADMIN — Medication 650 MILLIGRAM(S): at 00:39

## 2019-09-28 RX ADMIN — Medication 1 GRAM(S): at 13:57

## 2019-09-28 RX ADMIN — Medication 100 MILLIGRAM(S): at 13:57

## 2019-09-28 RX ADMIN — MORPHINE SULFATE 2 MILLIGRAM(S): 50 CAPSULE, EXTENDED RELEASE ORAL at 00:59

## 2019-09-28 RX ADMIN — Medication 50 MILLIGRAM(S): at 06:34

## 2019-09-28 RX ADMIN — Medication 25 MILLIGRAM(S): at 06:34

## 2019-09-28 RX ADMIN — Medication 1 MILLIGRAM(S): at 13:57

## 2019-09-28 RX ADMIN — Medication 25 MILLIGRAM(S): at 18:40

## 2019-09-28 RX ADMIN — ONDANSETRON 4 MILLIGRAM(S): 8 TABLET, FILM COATED ORAL at 13:56

## 2019-09-28 RX ADMIN — Medication 1 GRAM(S): at 18:41

## 2019-09-28 RX ADMIN — Medication 50 MILLIGRAM(S): at 15:03

## 2019-09-28 RX ADMIN — PANTOPRAZOLE SODIUM 40 MILLIGRAM(S): 20 TABLET, DELAYED RELEASE ORAL at 18:40

## 2019-09-28 RX ADMIN — PANTOPRAZOLE SODIUM 40 MILLIGRAM(S): 20 TABLET, DELAYED RELEASE ORAL at 06:34

## 2019-09-28 RX ADMIN — Medication 1 TABLET(S): at 13:57

## 2019-09-28 RX ADMIN — Medication 1 GRAM(S): at 06:34

## 2019-09-28 RX ADMIN — Medication 650 MILLIGRAM(S): at 00:59

## 2019-09-28 RX ADMIN — SODIUM CHLORIDE 125 MILLILITER(S): 9 INJECTION, SOLUTION INTRAVENOUS at 07:12

## 2019-09-28 NOTE — DISCHARGE NOTE NURSING/CASE MANAGEMENT/SOCIAL WORK - PATIENT PORTAL LINK FT
You can access the FollowMyHealth Patient Portal offered by [facility] by registering at the following website: [email or web address]. By joining [employer]’s FollowMyHealth portal, you will also be able to view your health information using other applications (apps) compatible with our system.

## 2019-09-28 NOTE — DISCHARGE NOTE PROVIDER - CARE PROVIDER_API CALL
Fransisco Alexandra)  Family Medicine  [address] [place] [address]  Phone: [phone]  Fax: [phone]  Follow Up Time:

## 2019-09-28 NOTE — DISCHARGE NOTE PROVIDER - HOSPITAL COURSE
Patient is a 54y old  Male who presents with a chief complaint of abdominal pain and vomiting (27 Sep 2019 16:01)        HPI:    53 y/o male with PMHx of Alcoholism with frequent admissions for withdrawal and other ETOH related conditions, essential HTN, Depression, and Hyperlipidemia.  Patient was recently admitted on 2019 for melena and hematemesis due to alcoholic erosive gastritis.  Patient states that he came to the ED for a 1 day history of persistent and recurrent vomiting.  He states that he has been drinking bottles of whisky and vodka for the last 3 days.  He states that his last drink was this morning and that he began to have abdominal pain and vomiting associated with his abdominal pain.  Patient describes his abdominal pain to be burning in nature, radiating to the throat, 8/10, constant, localized in the epigastric area, and worsened by vomiting.  Patient states that he has similar episodes in the past and that he has not been taking his medications over the last 3 days.  Recent relevant history, patient had EGD in 2019 which demonstrated the mild upper esophageal narrowing, and esophageal ulcer.  During this hospitalization and others, patient has required ICU care for Alcohol withdrawal requiring PRECEDEX/Phenobarbitol.      ED coursePatient having billious vomitus which is streaked with blood, complaining of abdominal painTachycardic (27 Sep 2019 16:01)SUBJECTIVE & OBJECTIVE: Pt seen and examined at bedside. He is tolerating diet, CIWA decreasing 4-5.  Receiving Librium and tolerating well.  No episodes of vomiting or hematemesis.  Diet advanced to regular.  D/c if tolerating regular diet.  Patient states that he will abstain from ETOH.  States that he will also take his medication as prescribed.      PHYSICAL EXAM:    ICU Vital Signs Last 24 Hrs    T(C): 37.3 (28 Sep 2019 12:27), Max: 37.7 (28 Sep 2019 00:18)    T(F): 99.1 (28 Sep 2019 12:27), Max: 99.8 (28 Sep 2019 00:18)    HR: 76 (28 Sep 2019 12:27) (73 - 106)    BP: 126/91 (28 Sep 2019 12:27) (126/91 - 151/54)    RR: 19 (28 Sep 2019 12:27) (17 - 20)    SpO2: 96% (28 Sep 2019 12:27) (95% - 97%)    Daily  Daily Weight in k (28 Sep 2019 05:29)    GENERAL: NAD, well-groomed, well-developed    HEAD:  Atraumatic, Normocephalic    EYES: EOMI, PERRLA, conjunctiva and sclera clear    ENMT: Moist mucous membranes    NECK: Supple, No JVD    NERVOUS SYSTEM:  Alert & Oriented X3, Motor Strength 5/5 B/L upper and lower extremities; DTRs 2+ intact and symmetric    CHEST/LUNG: Clear to auscultation bilaterally; No rales, rhonchi, wheezing, or rubs    HEART: Regular rate and rhythm; No murmurs, rubs, or gallops    ABDOMEN: Soft, Nontender, Nondistended; Bowel sounds present    EXTREMITIES:  2+ Peripheral Pulses, No clubbing, cyanosis, or edema        MEDICATIONS  (STANDING):    benzocaine 15 mG/menthol 3.6 mG Lozenge 1 Lozenge Oral two times a day    chlordiazePOXIDE 50 milliGRAM(s) Oral every 8 hours    chlordiazePOXIDE   Oral     folic acid 1 milliGRAM(s) Oral daily    lactated ringers. 1000 milliLiter(s) (125 mL/Hr) IV Continuous <Continuous>    metoprolol tartrate 25 milliGRAM(s) Oral two times a day    multivitamin 1 Tablet(s) Oral daily    pantoprazole  Injectable 40 milliGRAM(s) IV Push two times a day    QUEtiapine 50 milliGRAM(s) Oral at bedtime    simvastatin 20 milliGRAM(s) Oral at bedtime    sucralfate suspension 1 Gram(s) Oral four times a day    thiamine 100 milliGRAM(s) Oral daily        MEDICATIONS  (PRN):    acetaminophen   Tablet .. 650 milliGRAM(s) Oral every 4 hours PRN Mild Pain (1 - 3)    chlordiazePOXIDE 50 milliGRAM(s) Oral every 1 hour PRN Symptom-triggered: each CIWA -Ar score 8 or GREATER    morphine  - Injectable 2 milliGRAM(s) IV Push every 6 hours PRN Severe Pain (7 - 10)    ondansetron Injectable 4 milliGRAM(s) IV Push every 6 hours PRN Nausea and/or Vomiting    oxyCODONE    IR 5 milliGRAM(s) Oral every 4 hours PRN Moderate Pain (4 - 6)            LABS:                            16.0     8.72  )-----------( 269      ( 27 Sep 2019 13:49 )               47.8         09-28        143  |  105  |  13    ----------------------------<  86    3.3<L>   |  30  |  1.00

## 2019-10-07 DIAGNOSIS — R11.2 NAUSEA WITH VOMITING, UNSPECIFIED: ICD-10-CM

## 2019-10-07 DIAGNOSIS — F10.229 ALCOHOL DEPENDENCE WITH INTOXICATION, UNSPECIFIED: ICD-10-CM

## 2019-10-07 DIAGNOSIS — I10 ESSENTIAL (PRIMARY) HYPERTENSION: ICD-10-CM

## 2019-10-07 DIAGNOSIS — K29.20 ALCOHOLIC GASTRITIS WITHOUT BLEEDING: ICD-10-CM

## 2019-10-07 DIAGNOSIS — E55.9 VITAMIN D DEFICIENCY, UNSPECIFIED: ICD-10-CM

## 2019-10-07 DIAGNOSIS — E78.2 MIXED HYPERLIPIDEMIA: ICD-10-CM

## 2019-10-07 DIAGNOSIS — F32.9 MAJOR DEPRESSIVE DISORDER, SINGLE EPISODE, UNSPECIFIED: ICD-10-CM

## 2019-10-07 DIAGNOSIS — K27.9 PEPTIC ULCER, SITE UNSPECIFIED, UNSPECIFIED AS ACUTE OR CHRONIC, WITHOUT HEMORRHAGE OR PERFORATION: ICD-10-CM

## 2019-10-19 ENCOUNTER — INPATIENT (INPATIENT)
Facility: HOSPITAL | Age: 54
LOS: 1 days | Discharge: LEFT BEFORE DISCHARGE | End: 2019-10-21
Attending: STUDENT IN AN ORGANIZED HEALTH CARE EDUCATION/TRAINING PROGRAM | Admitting: STUDENT IN AN ORGANIZED HEALTH CARE EDUCATION/TRAINING PROGRAM
Payer: MEDICAID

## 2019-10-19 VITALS
OXYGEN SATURATION: 99 % | DIASTOLIC BLOOD PRESSURE: 101 MMHG | HEART RATE: 131 BPM | SYSTOLIC BLOOD PRESSURE: 159 MMHG | TEMPERATURE: 99 F | RESPIRATION RATE: 22 BRPM

## 2019-10-19 DIAGNOSIS — R10.9 UNSPECIFIED ABDOMINAL PAIN: ICD-10-CM

## 2019-10-19 DIAGNOSIS — R07.9 CHEST PAIN, UNSPECIFIED: ICD-10-CM

## 2019-10-19 DIAGNOSIS — E78.2 MIXED HYPERLIPIDEMIA: ICD-10-CM

## 2019-10-19 DIAGNOSIS — F10.230 ALCOHOL DEPENDENCE WITH WITHDRAWAL, UNCOMPLICATED: ICD-10-CM

## 2019-10-19 DIAGNOSIS — E87.2 ACIDOSIS: ICD-10-CM

## 2019-10-19 LAB
ACETONE SERPL-MCNC: NEGATIVE — SIGNIFICANT CHANGE UP
ALBUMIN SERPL ELPH-MCNC: 3.1 G/DL — LOW (ref 3.3–5)
ALBUMIN SERPL ELPH-MCNC: 4.9 G/DL — SIGNIFICANT CHANGE UP (ref 3.3–5)
ALP SERPL-CCNC: 37 U/L — LOW (ref 40–120)
ALP SERPL-CCNC: 55 U/L — SIGNIFICANT CHANGE UP (ref 40–120)
ALT FLD-CCNC: 55 U/L — SIGNIFICANT CHANGE UP (ref 12–78)
ALT FLD-CCNC: 83 U/L — HIGH (ref 12–78)
AMMONIA BLD-MCNC: 43 UMOL/L — HIGH (ref 11–32)
AMYLASE P1 CFR SERPL: 132 U/L — HIGH (ref 25–115)
ANION GAP SERPL CALC-SCNC: 12 MMOL/L — SIGNIFICANT CHANGE UP (ref 5–17)
ANION GAP SERPL CALC-SCNC: 19 MMOL/L — HIGH (ref 5–17)
APTT BLD: 22.6 SEC — LOW (ref 27.5–36.3)
AST SERPL-CCNC: 109 U/L — HIGH (ref 15–37)
AST SERPL-CCNC: 70 U/L — HIGH (ref 15–37)
BASOPHILS # BLD AUTO: 0.06 K/UL — SIGNIFICANT CHANGE UP (ref 0–0.2)
BASOPHILS NFR BLD AUTO: 1 % — SIGNIFICANT CHANGE UP (ref 0–2)
BILIRUB SERPL-MCNC: 0.7 MG/DL — SIGNIFICANT CHANGE UP (ref 0.2–1.2)
BILIRUB SERPL-MCNC: 0.8 MG/DL — SIGNIFICANT CHANGE UP (ref 0.2–1.2)
BLD GP AB SCN SERPL QL: SIGNIFICANT CHANGE UP
BUN SERPL-MCNC: 14 MG/DL — SIGNIFICANT CHANGE UP (ref 7–23)
BUN SERPL-MCNC: 15 MG/DL — SIGNIFICANT CHANGE UP (ref 7–23)
CALCIUM SERPL-MCNC: 7.3 MG/DL — LOW (ref 8.5–10.1)
CALCIUM SERPL-MCNC: 9.9 MG/DL — SIGNIFICANT CHANGE UP (ref 8.5–10.1)
CHLORIDE SERPL-SCNC: 108 MMOL/L — SIGNIFICANT CHANGE UP (ref 96–108)
CHLORIDE SERPL-SCNC: 96 MMOL/L — SIGNIFICANT CHANGE UP (ref 96–108)
CK MB BLD-MCNC: 0.6 % — SIGNIFICANT CHANGE UP (ref 0–3.5)
CK MB CFR SERPL CALC: 1.8 NG/ML — SIGNIFICANT CHANGE UP (ref 0.5–3.6)
CK SERPL-CCNC: 278 U/L — SIGNIFICANT CHANGE UP (ref 26–308)
CO2 SERPL-SCNC: 23 MMOL/L — SIGNIFICANT CHANGE UP (ref 22–31)
CO2 SERPL-SCNC: 23 MMOL/L — SIGNIFICANT CHANGE UP (ref 22–31)
CREAT SERPL-MCNC: 0.72 MG/DL — SIGNIFICANT CHANGE UP (ref 0.5–1.3)
CREAT SERPL-MCNC: 0.93 MG/DL — SIGNIFICANT CHANGE UP (ref 0.5–1.3)
EOSINOPHIL # BLD AUTO: 0.01 K/UL — SIGNIFICANT CHANGE UP (ref 0–0.5)
EOSINOPHIL NFR BLD AUTO: 0.2 % — SIGNIFICANT CHANGE UP (ref 0–6)
ETHANOL SERPL-MCNC: 263 MG/DL — HIGH (ref 0–10)
GLUCOSE BLDC GLUCOMTR-MCNC: 102 MG/DL — HIGH (ref 70–99)
GLUCOSE SERPL-MCNC: 100 MG/DL — HIGH (ref 70–99)
GLUCOSE SERPL-MCNC: 80 MG/DL — SIGNIFICANT CHANGE UP (ref 70–99)
HCT VFR BLD CALC: 50.4 % — HIGH (ref 39–50)
HGB BLD-MCNC: 16.6 G/DL — SIGNIFICANT CHANGE UP (ref 13–17)
IMM GRANULOCYTES NFR BLD AUTO: 0.2 % — SIGNIFICANT CHANGE UP (ref 0–1.5)
INR BLD: 0.97 RATIO — SIGNIFICANT CHANGE UP (ref 0.88–1.16)
LACTATE SERPL-SCNC: 3.2 MMOL/L — HIGH (ref 0.7–2)
LACTATE SERPL-SCNC: 5.7 MMOL/L — CRITICAL HIGH (ref 0.7–2)
LACTATE SERPL-SCNC: 8.5 MMOL/L — CRITICAL HIGH (ref 0.7–2)
LIDOCAIN IGE QN: 237 U/L — SIGNIFICANT CHANGE UP (ref 73–393)
LYMPHOCYTES # BLD AUTO: 1.94 K/UL — SIGNIFICANT CHANGE UP (ref 1–3.3)
LYMPHOCYTES # BLD AUTO: 33.2 % — SIGNIFICANT CHANGE UP (ref 13–44)
MAGNESIUM SERPL-MCNC: 1.5 MG/DL — LOW (ref 1.6–2.6)
MAGNESIUM SERPL-MCNC: 2 MG/DL — SIGNIFICANT CHANGE UP (ref 1.6–2.6)
MCHC RBC-ENTMCNC: 28.1 PG — SIGNIFICANT CHANGE UP (ref 27–34)
MCHC RBC-ENTMCNC: 32.9 GM/DL — SIGNIFICANT CHANGE UP (ref 32–36)
MCV RBC AUTO: 85.3 FL — SIGNIFICANT CHANGE UP (ref 80–100)
MONOCYTES # BLD AUTO: 0.26 K/UL — SIGNIFICANT CHANGE UP (ref 0–0.9)
MONOCYTES NFR BLD AUTO: 4.4 % — SIGNIFICANT CHANGE UP (ref 2–14)
NEUTROPHILS # BLD AUTO: 3.57 K/UL — SIGNIFICANT CHANGE UP (ref 1.8–7.4)
NEUTROPHILS NFR BLD AUTO: 61 % — SIGNIFICANT CHANGE UP (ref 43–77)
NRBC # BLD: 0 /100 WBCS — SIGNIFICANT CHANGE UP (ref 0–0)
PHOSPHATE SERPL-MCNC: 3.1 MG/DL — SIGNIFICANT CHANGE UP (ref 2.5–4.5)
PHOSPHATE SERPL-MCNC: 4 MG/DL — SIGNIFICANT CHANGE UP (ref 2.5–4.5)
PLATELET # BLD AUTO: 248 K/UL — SIGNIFICANT CHANGE UP (ref 150–400)
POTASSIUM SERPL-MCNC: 4.1 MMOL/L — SIGNIFICANT CHANGE UP (ref 3.5–5.3)
POTASSIUM SERPL-MCNC: 4.2 MMOL/L — SIGNIFICANT CHANGE UP (ref 3.5–5.3)
POTASSIUM SERPL-SCNC: 4.1 MMOL/L — SIGNIFICANT CHANGE UP (ref 3.5–5.3)
POTASSIUM SERPL-SCNC: 4.2 MMOL/L — SIGNIFICANT CHANGE UP (ref 3.5–5.3)
PROT SERPL-MCNC: 6.3 GM/DL — SIGNIFICANT CHANGE UP (ref 6–8.3)
PROT SERPL-MCNC: 9.3 GM/DL — HIGH (ref 6–8.3)
PROTHROM AB SERPL-ACNC: 10.8 SEC — SIGNIFICANT CHANGE UP (ref 10–12.9)
RBC # BLD: 5.91 M/UL — HIGH (ref 4.2–5.8)
RBC # FLD: 13 % — SIGNIFICANT CHANGE UP (ref 10.3–14.5)
SODIUM SERPL-SCNC: 138 MMOL/L — SIGNIFICANT CHANGE UP (ref 135–145)
SODIUM SERPL-SCNC: 143 MMOL/L — SIGNIFICANT CHANGE UP (ref 135–145)
TROPONIN I SERPL-MCNC: <.015 NG/ML — SIGNIFICANT CHANGE UP (ref 0.01–0.04)
TROPONIN I SERPL-MCNC: <.015 NG/ML — SIGNIFICANT CHANGE UP (ref 0.01–0.04)
WBC # BLD: 5.85 K/UL — SIGNIFICANT CHANGE UP (ref 3.8–10.5)
WBC # FLD AUTO: 5.85 K/UL — SIGNIFICANT CHANGE UP (ref 3.8–10.5)

## 2019-10-19 PROCEDURE — 93010 ELECTROCARDIOGRAM REPORT: CPT

## 2019-10-19 PROCEDURE — 71045 X-RAY EXAM CHEST 1 VIEW: CPT | Mod: 26

## 2019-10-19 PROCEDURE — 74176 CT ABD & PELVIS W/O CONTRAST: CPT | Mod: 26

## 2019-10-19 PROCEDURE — 99291 CRITICAL CARE FIRST HOUR: CPT

## 2019-10-19 RX ORDER — FOLIC ACID 0.8 MG
1 TABLET ORAL DAILY
Refills: 0 | Status: DISCONTINUED | OUTPATIENT
Start: 2019-10-20 | End: 2019-10-21

## 2019-10-19 RX ORDER — DIAZEPAM 5 MG
10 TABLET ORAL ONCE
Refills: 0 | Status: DISCONTINUED | OUTPATIENT
Start: 2019-10-19 | End: 2019-10-19

## 2019-10-19 RX ORDER — SODIUM CHLORIDE 9 MG/ML
1000 INJECTION, SOLUTION INTRAVENOUS
Refills: 0 | Status: COMPLETED | OUTPATIENT
Start: 2019-10-19 | End: 2019-10-19

## 2019-10-19 RX ORDER — SODIUM CHLORIDE 9 MG/ML
1000 INJECTION, SOLUTION INTRAVENOUS ONCE
Refills: 0 | Status: COMPLETED | OUTPATIENT
Start: 2019-10-19 | End: 2019-10-19

## 2019-10-19 RX ORDER — DIAZEPAM 5 MG
15 TABLET ORAL ONCE
Refills: 0 | Status: DISCONTINUED | OUTPATIENT
Start: 2019-10-19 | End: 2019-10-19

## 2019-10-19 RX ORDER — PANTOPRAZOLE SODIUM 20 MG/1
40 TABLET, DELAYED RELEASE ORAL ONCE
Refills: 0 | Status: COMPLETED | OUTPATIENT
Start: 2019-10-19 | End: 2019-10-19

## 2019-10-19 RX ORDER — ONDANSETRON 8 MG/1
4 TABLET, FILM COATED ORAL ONCE
Refills: 0 | Status: COMPLETED | OUTPATIENT
Start: 2019-10-19 | End: 2019-10-19

## 2019-10-19 RX ORDER — DEXMEDETOMIDINE HYDROCHLORIDE IN 0.9% SODIUM CHLORIDE 4 UG/ML
0.2 INJECTION INTRAVENOUS
Qty: 200 | Refills: 0 | Status: DISCONTINUED | OUTPATIENT
Start: 2019-10-19 | End: 2019-10-21

## 2019-10-19 RX ORDER — THIAMINE MONONITRATE (VIT B1) 100 MG
100 TABLET ORAL ONCE
Refills: 0 | Status: COMPLETED | OUTPATIENT
Start: 2019-10-20 | End: 2019-10-20

## 2019-10-19 RX ORDER — PHENOBARBITAL 60 MG
130 TABLET ORAL EVERY 4 HOURS
Refills: 0 | Status: DISCONTINUED | OUTPATIENT
Start: 2019-10-19 | End: 2019-10-21

## 2019-10-19 RX ORDER — SODIUM CHLORIDE 9 MG/ML
1000 INJECTION INTRAMUSCULAR; INTRAVENOUS; SUBCUTANEOUS ONCE
Refills: 0 | Status: COMPLETED | OUTPATIENT
Start: 2019-10-19 | End: 2019-10-19

## 2019-10-19 RX ORDER — CHLORHEXIDINE GLUCONATE 213 G/1000ML
1 SOLUTION TOPICAL
Refills: 0 | Status: DISCONTINUED | OUTPATIENT
Start: 2019-10-19 | End: 2019-10-21

## 2019-10-19 RX ORDER — PANTOPRAZOLE SODIUM 20 MG/1
40 TABLET, DELAYED RELEASE ORAL EVERY 12 HOURS
Refills: 0 | Status: DISCONTINUED | OUTPATIENT
Start: 2019-10-19 | End: 2019-10-21

## 2019-10-19 RX ADMIN — SODIUM CHLORIDE 1000 MILLILITER(S): 9 INJECTION, SOLUTION INTRAVENOUS at 17:05

## 2019-10-19 RX ADMIN — Medication 130 MILLIGRAM(S): at 18:57

## 2019-10-19 RX ADMIN — SODIUM CHLORIDE 1000 MILLILITER(S): 9 INJECTION INTRAMUSCULAR; INTRAVENOUS; SUBCUTANEOUS at 20:01

## 2019-10-19 RX ADMIN — SODIUM CHLORIDE 1000 MILLILITER(S): 9 INJECTION INTRAMUSCULAR; INTRAVENOUS; SUBCUTANEOUS at 15:47

## 2019-10-19 RX ADMIN — ONDANSETRON 4 MILLIGRAM(S): 8 TABLET, FILM COATED ORAL at 18:54

## 2019-10-19 RX ADMIN — DEXMEDETOMIDINE HYDROCHLORIDE IN 0.9% SODIUM CHLORIDE 3.7 MICROGRAM(S)/KG/HR: 4 INJECTION INTRAVENOUS at 20:01

## 2019-10-19 RX ADMIN — ONDANSETRON 4 MILLIGRAM(S): 8 TABLET, FILM COATED ORAL at 15:47

## 2019-10-19 RX ADMIN — Medication 10 MILLIGRAM(S): at 17:07

## 2019-10-19 RX ADMIN — Medication 10 MILLIGRAM(S): at 15:47

## 2019-10-19 RX ADMIN — DEXMEDETOMIDINE HYDROCHLORIDE IN 0.9% SODIUM CHLORIDE 3.7 MICROGRAM(S)/KG/HR: 4 INJECTION INTRAVENOUS at 18:57

## 2019-10-19 RX ADMIN — SODIUM CHLORIDE 500 MILLILITER(S): 9 INJECTION, SOLUTION INTRAVENOUS at 17:44

## 2019-10-19 RX ADMIN — PANTOPRAZOLE SODIUM 40 MILLIGRAM(S): 20 TABLET, DELAYED RELEASE ORAL at 20:00

## 2019-10-19 RX ADMIN — PANTOPRAZOLE SODIUM 40 MILLIGRAM(S): 20 TABLET, DELAYED RELEASE ORAL at 15:47

## 2019-10-19 NOTE — ED PROVIDER NOTE - CLINICAL SUMMARY MEDICAL DECISION MAKING FREE TEXT BOX
pt pw etoh abuse and likely withdrawal simultanouesly. he is tachycardic and needs fluids and anti-emetics. pt pw etoh abuse and likely withdrawal simultaneously. he is tachycardic and needs fluids and anti-emetics and benzos. ct abd pending. ciwa 18. icu admit

## 2019-10-19 NOTE — H&P ADULT - NSHPPHYSICALEXAM_GEN_ALL_CORE
General: No acute distress, however, shaking, Non-toxic, appears stated age.   HEENT: Pupils equal and symmetrically reactive to light. 4 mm b/l sluggish.  PULM: Clear to auscultation bilaterally. Equal chest expansion, no crepitus noted.  CVS: Regular rate and rhythm, no murmurs, rubs, or gallops. Capillary refill < 3 seconds. Pulses 2+ in extremities x4.  ABD: Tender to palpation with some guarding noted, soft, nondistended, no masses.  EXT: No edema.  SKIN: Warm and well perfused, no rashes.      IMPROVE VTE Individual Risk Assessment          RISK                                                          Points  [  ] Previous VTE                                                3  [  ] Thrombophilia                                             2  [  ] Lower limb paralysis                                     2        (unable to hold up >15 seconds)    [  ] Current Cancer                                             2         (within 6 months)  [  ] Immobilization > 24 hrs                               1  [ x ] ICU/CCU stay > 24 hours                              1  [  ] Age > 60                                                     1    IMPROVE VTE Score:         [   1    ]    Total Risk Factor Score:    0 - 1:   Consider IPC  >2 - 3:  Thromboprophylaxis required (enoxaparin or SQ heparin)        >4:   High Risk: Thromboprophylaxis required (enoxaparin or SQ heparin), optional add IPC  **If CONTRAINDICATION to enoxaparin or SQ heparin, USE IPCs**    **Pt started on intermittent sequential compression devices**

## 2019-10-19 NOTE — ED PROVIDER NOTE - CARE PLAN
Principal Discharge DX:	Alcohol abuse  Secondary Diagnosis:	Alcohol withdrawal syndrome without complication

## 2019-10-19 NOTE — H&P ADULT - NSICDXPASTMEDICALHX_GEN_ALL_CORE_FT
PAST MEDICAL HISTORY:  DTs (delirium tremens)     EtOH dependence     Gastritis     Mixed hyperlipidemia     PUD (peptic ulcer disease)     Substance abuse

## 2019-10-19 NOTE — ED ADULT TRIAGE NOTE - CHIEF COMPLAINT QUOTE
pt complaining of abdominal pain, nausea, vomiting blood and tremors. history of alcohol abuse. states he drink vodka today. hr 131 at triage.

## 2019-10-19 NOTE — H&P ADULT - NSHPSOCIALHISTORY_GEN_ALL_CORE
Unable to assess as pt confused during time of social history assessment, Please follow up, however, pt does report to ETOH ingestion, liquor.

## 2019-10-19 NOTE — H&P ADULT - NSHPLABSRESULTS_GEN_ALL_CORE
## Labs:  CBC:                        16.6   5.85  )-----------( 248      ( 19 Oct 2019 15:32 )             50.4     Chem:  10-19    138  |  96  |  15  ----------------------------<  100<H>  4.1   |  23  |  0.93    Ca    9.9      19 Oct 2019 15:32  Phos  4.0     10-19  Mg     2.0     10-19    TPro  9.3<H>  /  Alb  4.9  /  TBili  0.7  /  DBili  x   /  AST  109<H>  /  ALT  83<H>  /  AlkPhos  55  10-19    Coags:  PT/INR - ( 19 Oct 2019 15:32 )   PT: 10.8 sec;   INR: 0.97 ratio    PTT - ( 19 Oct 2019 15:32 )  PTT:22.6 sec      ## Imaging:   CT Abdomen and Pelvis No Cont (10.19.19 @ 17:30)  IMPRESSION:  Distal esophageal and gastric wall thickening, question esophagitis and   gastritis.  Hepatic steatosis.  No bowel obstruction.    Xray Chest 1 View- PORTABLE-Urgent (10.19.19 @ 16:20)  IMPRESSION:  Clear lungs.      ## Medications:  pantoprazole  Injectable 40 milliGRAM(s) IV Push every 12 hours  dexmedetomidine Infusion 0.2 MICROgram(s)/kG/Hr IV Continuous <Continuous>  LORazepam   Injectable 2 milliGRAM(s) IV Push once PRN  PHENobarbital Injectable 130 milliGRAM(s) IV Push every 4 hours PRN        ## Vitals:  T(C): 37.6 (10-19-19 @ 19:15), Max: 37.6 (10-19-19 @ 19:15)  HR: 104 (10-19-19 @ 19:01) (101 - 131)  BP: 137/86 (10-19-19 @ 19:01) (123/72 - 159/101)  BP(mean): --  RR: 18 (10-19-19 @ 19:01) (18 - 22)  SpO2: 97% (10-19-19 @ 19:01) (97% - 100%)  Wt(kg): 74      10-19 @ 07:01  -  10-19 @ 19:40  --------------------------------------------------------  IN: 9.3 mL / OUT: 0 mL / NET: 9.3 mL

## 2019-10-19 NOTE — H&P ADULT - PROBLEM SELECTOR PLAN 4
-troponin x1 in ED neg, repeat at 2100 tonight 10/19  -12 lead EKG follow up for any changes  -monitor, assess for s/s AMI given demographics/history places pt at risk, however pt c/o generalized pain  -consider possible alcoholic cardiomyopathy as baseline, however, no s/s of HF noted on exam, vitals, or laboratory workup

## 2019-10-19 NOTE — H&P ADULT - ASSESSMENT
57 yo male with PMH ETOH w/d (s/p frequent hospital admissions), GERD, HLD, alcoholic gastritis/esophagitis, PUD brought to ICU for management of ETOH withdrawal, and abdominal pain, N/V, hematemasis (scant, dark brown) likely 2/2 gastric ulcer vs gastritis/esophagitis. Pt noted to be with increased lactate, likely 2/2 alcoholic ketoacidosis, clearing with IVF administration. Cardiac workup currently negative. CT abd neg, amylase/lipase negative.

## 2019-10-19 NOTE — H&P ADULT - PROBLEM SELECTOR PLAN 2
-continue serial lactate measurement  -1 L additional NS ordered for increased clearance  -no s/s of hypoperfusion, likely type B, NOT type A  -follow up labs

## 2019-10-19 NOTE — H&P ADULT - PROBLEM SELECTOR PLAN 3
-serial abdominal assessments; monitor color/volume of hematemasis  -likely 2/2 PUD vs Gastritis (CT Abdomen neg for other etiology, lipase neg, afebrile)  -follow up procal   -zofran IVP x1 ordered for N/V  -Pantoprazole 40 mg IVP BID rx for now  -follow up labs, to include h/h, cmp, lactate, ammonia  -GI consult tomorrow

## 2019-10-19 NOTE — ED PROVIDER NOTE - PHYSICAL EXAMINATION
Gen: Alert, appears intoxicated  Head: NC, AT   Eyes: PERRL, EOMI, normal lids/conjunctiva  ENT: normal hearing, patent oropharynx without erythema/exudate, uvula midline  Neck: supple, no tenderness, Trachea midline  Pulm: Bilateral BS, normal resp effort, no wheeze/stridor/retractions  CV: tachycardia. no M/R/G, 2+ radial and dp pulses bl, no edema  Abd: soft, NT/ND, actively vomiting.   Mskel: extremities x4 with normal ROM and no joint effusions. no ctl spine ttp.   Skin: no rash, no bruising   Neuro: AAOx2, no sensory/motor deficits, CN 2-12 intact

## 2019-10-19 NOTE — ED PROVIDER NOTE - OBJECTIVE STATEMENT
Pertinent PMH/PSH/FHx/SHx and Review of Systems contained within:  54M hx etoh abuse pw acute intox and withdrawal. notes last drink was 2 days ago but then notes he drank vodka this afternoon. he is a poor historian. notes abd pain, nausea, vomiting, cp and weakness. denies ha, vision loss, rhinorrhea, dysuria, rash, bleeding, patient seen in the er often for etoh abuse  Fh and Sh not otherwise contributory  ROS otherwise negative

## 2019-10-19 NOTE — ED ADULT NURSE NOTE - OBJECTIVE STATEMENT
pt presents to the ED with c/o nausea and vomiting with abdominal pain + anxious + tremors known ETOH abuse last drink today

## 2019-10-19 NOTE — H&P ADULT - ATTENDING COMMENTS
Pt seen and examined on arrival to ICU on 10/19    56M PMH chronic ETOH abuse/dependence with hx of alcohol withdrawal s/p frequent hospital admissions, GERD, HLD, alcoholic gastritis/esophagitis, PUD, hx of hypoxic respiratory failure requiring intubation due to aspiration PNA, staph PNA here with abdominal pain, lactic acidosis, alcohol withdrawal.    - place on precedex drip  - phenobarbital  - abdominal pain likely due to alcoholic gastritis: pt with prior EGD showing gastritis and esophagitis, no evidence of pancreatitis  - carafate and protonix  - monitor electrolytes and supplement as needed  - monitor for any signs of bleeding  - H/H stable at present time, will trend  - type B lactic acidosis from alcohol abuse, IVF, trend lactate    Critical Care Time: 35 min

## 2019-10-19 NOTE — H&P ADULT - PROBLEM SELECTOR PLAN 1
-monitor vitals, maintain adequate IV access, monitor airway  -Precedex drip with goal RASS 0/-1  -Monitor CIWA scale, report to practitioner   -Phenobarb 130 mg IVP q4h for CIWA >15, will adjust 2/2 pt response  -Seizure precautions/safety protocol, Ativan 2 mg if seizure activity occurs  -Keep NPO for now  -If lethargic, consider addition of ETCO2  -thiamine, folate, multivitamin  -banana bag (multivitamin) to finish from ED  -DVT prop with SCD for now  -repeat lab work tonight at 2100 and AM labs follow up

## 2019-10-19 NOTE — H&P ADULT - NSHPREVIEWOFSYSTEMS_GEN_ALL_CORE
REVIEW OF SYSTEMS: Limited given pt poor historian.    CONSTITUTIONAL: No fever/chills  EYES: No eye pain, visual disturbances, or discharge  ENMT:  No difficulty hearing, tinnitus, vertigo; No sinus or throat pain  NECK: No pain or stiffness  RESPIRATORY: No cough, wheezing, chills or hemoptysis; No shortness of breath  CARDIOVASCULAR: Reports chest pain, unble to quantify/qualify at this time. Denies palpitations, dizziness, or leg swelling  GASTROINTESTINAL: Reports diffuse abdominal pain, with + N/V and +hematemesis; No diarrhea or constipation. No melena or hematochezia.  GENITOURINARY: No dysuria, frequency, hematuria, or incontinence  NEUROLOGICAL: No headaches, memory loss, loss of strength, numbness, or tremors  SKIN: No itching, burning, rashes, or lesions   MUSCULOSKELETAL: No joint pain or swelling; No muscle, back, or extremity pain  ALLERGY AND IMMUNOLOGIC: No hives or eczema

## 2019-10-19 NOTE — H&P ADULT - HISTORY OF PRESENT ILLNESS
55 yo male with PMH ETOH w/d (s/p frequent hospital admissions), GERD, HLD, alcoholic gastritis/esophagitis, PUD presented to [facility] ED with c/o diffuse abdominal pain, chest pain, generalized body pain, weakness, N/V, and hematemasis (scant/dark brown). Pt reports he had last drink 2 days prior, however, before coming here today he admits to drinking vodka (unknown amount; alcohol level 263 in ED). Pt admitted to the ICU for ETOH withdrawal with consistently high CIWA score's despite tx with Valium 10 mg IV x2, tender abdomen, and ETOH ketoacidosis which has been improving with IVF. No signs of hypoperfusion noted. Patient denies fever, chills, SOB, palpitations, dizziness, rash, or dysuria.

## 2019-10-20 LAB
AMMONIA BLD-MCNC: 62 UMOL/L — HIGH (ref 11–32)
ANION GAP SERPL CALC-SCNC: 11 MMOL/L — SIGNIFICANT CHANGE UP (ref 5–17)
BUN SERPL-MCNC: 15 MG/DL — SIGNIFICANT CHANGE UP (ref 7–23)
CALCIUM SERPL-MCNC: 7.8 MG/DL — LOW (ref 8.5–10.1)
CHLORIDE SERPL-SCNC: 110 MMOL/L — HIGH (ref 96–108)
CO2 SERPL-SCNC: 22 MMOL/L — SIGNIFICANT CHANGE UP (ref 22–31)
CREAT SERPL-MCNC: 0.65 MG/DL — SIGNIFICANT CHANGE UP (ref 0.5–1.3)
GLUCOSE SERPL-MCNC: 85 MG/DL — SIGNIFICANT CHANGE UP (ref 70–99)
HCT VFR BLD CALC: 37.4 % — LOW (ref 39–50)
HGB BLD-MCNC: 11.9 G/DL — LOW (ref 13–17)
LACTATE SERPL-SCNC: 2.1 MMOL/L — HIGH (ref 0.7–2)
MAGNESIUM SERPL-MCNC: 1.7 MG/DL — SIGNIFICANT CHANGE UP (ref 1.6–2.6)
MCHC RBC-ENTMCNC: 28.5 PG — SIGNIFICANT CHANGE UP (ref 27–34)
MCHC RBC-ENTMCNC: 31.8 GM/DL — LOW (ref 32–36)
MCV RBC AUTO: 89.7 FL — SIGNIFICANT CHANGE UP (ref 80–100)
NRBC # BLD: 0 /100 WBCS — SIGNIFICANT CHANGE UP (ref 0–0)
PHOSPHATE SERPL-MCNC: 2.5 MG/DL — SIGNIFICANT CHANGE UP (ref 2.5–4.5)
PLATELET # BLD AUTO: 137 K/UL — LOW (ref 150–400)
POTASSIUM SERPL-MCNC: 3.9 MMOL/L — SIGNIFICANT CHANGE UP (ref 3.5–5.3)
POTASSIUM SERPL-SCNC: 3.9 MMOL/L — SIGNIFICANT CHANGE UP (ref 3.5–5.3)
PROCALCITONIN SERPL-MCNC: 0.05 NG/ML — SIGNIFICANT CHANGE UP (ref 0.02–0.1)
RBC # BLD: 4.17 M/UL — LOW (ref 4.2–5.8)
RBC # FLD: 13 % — SIGNIFICANT CHANGE UP (ref 10.3–14.5)
SODIUM SERPL-SCNC: 143 MMOL/L — SIGNIFICANT CHANGE UP (ref 135–145)
WBC # BLD: 5.19 K/UL — SIGNIFICANT CHANGE UP (ref 3.8–10.5)
WBC # FLD AUTO: 5.19 K/UL — SIGNIFICANT CHANGE UP (ref 3.8–10.5)

## 2019-10-20 PROCEDURE — 70490 CT SOFT TISSUE NECK W/O DYE: CPT | Mod: 26

## 2019-10-20 PROCEDURE — 70360 X-RAY EXAM OF NECK: CPT | Mod: 26

## 2019-10-20 PROCEDURE — 71250 CT THORAX DX C-: CPT | Mod: 26

## 2019-10-20 PROCEDURE — 99232 SBSQ HOSP IP/OBS MODERATE 35: CPT

## 2019-10-20 PROCEDURE — 71046 X-RAY EXAM CHEST 2 VIEWS: CPT | Mod: 26

## 2019-10-20 RX ORDER — ACETAMINOPHEN 500 MG
650 TABLET ORAL EVERY 6 HOURS
Refills: 0 | Status: DISCONTINUED | OUTPATIENT
Start: 2019-10-20 | End: 2019-10-21

## 2019-10-20 RX ORDER — LACTULOSE 10 G/15ML
10 SOLUTION ORAL
Refills: 0 | Status: DISCONTINUED | OUTPATIENT
Start: 2019-10-20 | End: 2019-10-21

## 2019-10-20 RX ORDER — ACETAMINOPHEN 500 MG
500 TABLET ORAL ONCE
Refills: 0 | Status: DISCONTINUED | OUTPATIENT
Start: 2019-10-20 | End: 2019-10-20

## 2019-10-20 RX ORDER — PHENOBARBITAL 60 MG
130 TABLET ORAL EVERY 6 HOURS
Refills: 0 | Status: DISCONTINUED | OUTPATIENT
Start: 2019-10-20 | End: 2019-10-21

## 2019-10-20 RX ORDER — SUCRALFATE 1 G
1 TABLET ORAL EVERY 6 HOURS
Refills: 0 | Status: DISCONTINUED | OUTPATIENT
Start: 2019-10-20 | End: 2019-10-21

## 2019-10-20 RX ORDER — SUCRALFATE 1 G
1 TABLET ORAL EVERY 6 HOURS
Refills: 0 | Status: DISCONTINUED | OUTPATIENT
Start: 2019-10-20 | End: 2019-10-20

## 2019-10-20 RX ADMIN — Medication 130 MILLIGRAM(S): at 19:09

## 2019-10-20 RX ADMIN — Medication 1 GRAM(S): at 14:59

## 2019-10-20 RX ADMIN — Medication 650 MILLIGRAM(S): at 10:18

## 2019-10-20 RX ADMIN — Medication 130 MILLIGRAM(S): at 12:58

## 2019-10-20 RX ADMIN — CHLORHEXIDINE GLUCONATE 1 APPLICATION(S): 213 SOLUTION TOPICAL at 13:00

## 2019-10-20 RX ADMIN — Medication 100 MILLIGRAM(S): at 03:04

## 2019-10-20 RX ADMIN — Medication 650 MILLIGRAM(S): at 09:18

## 2019-10-20 RX ADMIN — DEXMEDETOMIDINE HYDROCHLORIDE IN 0.9% SODIUM CHLORIDE 3.7 MICROGRAM(S)/KG/HR: 4 INJECTION INTRAVENOUS at 05:49

## 2019-10-20 RX ADMIN — DEXMEDETOMIDINE HYDROCHLORIDE IN 0.9% SODIUM CHLORIDE 3.7 MICROGRAM(S)/KG/HR: 4 INJECTION INTRAVENOUS at 08:00

## 2019-10-20 RX ADMIN — PANTOPRAZOLE SODIUM 40 MILLIGRAM(S): 20 TABLET, DELAYED RELEASE ORAL at 05:48

## 2019-10-20 RX ADMIN — Medication 130 MILLIGRAM(S): at 19:07

## 2019-10-20 RX ADMIN — LACTULOSE 10 GRAM(S): 10 SOLUTION ORAL at 05:48

## 2019-10-20 RX ADMIN — PANTOPRAZOLE SODIUM 40 MILLIGRAM(S): 20 TABLET, DELAYED RELEASE ORAL at 19:06

## 2019-10-20 RX ADMIN — Medication 1 MILLIGRAM(S): at 12:13

## 2019-10-20 RX ADMIN — Medication 1 GRAM(S): at 19:11

## 2019-10-20 RX ADMIN — LACTULOSE 10 GRAM(S): 10 SOLUTION ORAL at 19:06

## 2019-10-20 RX ADMIN — Medication 1 TABLET(S): at 12:13

## 2019-10-20 NOTE — PROGRESS NOTE ADULT - ASSESSMENT
56M PMH chronic ETOH abuse/dependence with hx of alcohol withdrawal s/p frequent hospital admissions, GERD, HLD, alcoholic gastritis/esophagitis, PUD, hx of hypoxic respiratory failure requiring intubation due to aspiration PNA, staph PNA here with abdominal pain, lactic acidosis, alcohol withdrawal.    - wean off precedex as tolerates  - place on standing phenobarbital 130mg q6 hours for ETOH withdrawal  - lactic acidosis improving with IVF, type B lactic acidosis due to alcoholism  - mvi, thiamine, folic acid  - pt had choking episode on carafate pill: imaging studies does not reveal pill to be lodged in esophagus or airway  - will change carafate to liquid solution form  - cont with protonix  - no further episodes of emesis  - drinking cessation discussed at length with patient  - monitor electrolytes and supplement as needed    Critical Care Time: 45 min

## 2019-10-20 NOTE — PROGRESS NOTE ADULT - SUBJECTIVE AND OBJECTIVE BOX
HPI:  57 yo male with PMH ETOH w/d (s/p frequent hospital admissions), GERD, HLD, alcoholic gastritis/esophagitis, PUD presented to [facility] ED with c/o diffuse abdominal pain, chest pain, generalized body pain, weakness, N/V, and hematemasis (scant/dark brown). Pt reports he had last drink 2 days prior, however, before coming here today he admits to drinking vodka (unknown amount; alcohol level 263 in ED). Pt admitted to the ICU for ETOH withdrawal with consistently high CIWA score's despite tx with Valium 10 mg IV x2, tender abdomen, and ETOH ketoacidosis which has been improving with IVF. No signs of hypoperfusion noted. Patient denies fever, chills, SOB, palpitations, dizziness, rash, or dysuria. (19 Oct 2019 18:56)      24 hr events:      ## ROS:  [ ] unable to obtain  CONSTITUTIONAL: No fever, weight loss, or fatigue  EYES: No eye pain, visual disturbances, or discharge  ENMT:  No difficulty hearing, tinnitus, vertigo; No sinus or throat pain  NECK: No pain or stiffness  RESPIRATORY: No cough, wheezing, chills or hemoptysis; No shortness of breath  CARDIOVASCULAR: No chest pain, palpitations, dizziness, or leg swelling  GASTROINTESTINAL: No abdominal or epigastric pain. No nausea, vomiting, or hematemesis; No diarrhea or constipation. No melena or hematochezia.  GENITOURINARY: No dysuria, frequency, hematuria, or incontinence  NEUROLOGICAL: No headaches, memory loss, loss of strength, numbness, or tremors  SKIN: No itching, burning, rashes, or lesions   LYMPH NODES: No enlarged glands  ENDOCRINE: No heat or cold intolerance; No hair loss  MUSCULOSKELETAL: No joint pain or swelling; No muscle, back, or extremity pain  PSYCHIATRIC: No depression, anxiety, mood swings, or difficulty sleeping  HEME/LYMPH: No easy bruising, or bleeding gums  ALLERGY AND IMMUNOLOGIC: No hives or eczema    ## Labs:  CBC:                        11.9   5.19  )-----------( 137      ( 20 Oct 2019 03:47 )             37.4     Chem:  10-20    143  |  110<H>  |  15  ----------------------------<  85  3.9   |  22  |  0.65    Ca    7.8<L>      20 Oct 2019 03:47  Phos  2.5     10-20  Mg     1.7     10-20    TPro  6.3  /  Alb  3.1<L>  /  TBili  0.8  /  DBili  x   /  AST  70<H>  /  ALT  55  /  AlkPhos  37<L>  10-19    Coags:  PT/INR - ( 19 Oct 2019 15:32 )   PT: 10.8 sec;   INR: 0.97 ratio         PTT - ( 19 Oct 2019 15:32 )  PTT:22.6 sec        ## Imaging:    ## Medications:            lactulose Syrup 10 Gram(s) Oral two times a day  pantoprazole  Injectable 40 milliGRAM(s) IV Push every 12 hours  sucralfate suspension 1 Gram(s) Oral every 6 hours    acetaminophen   Tablet .. 650 milliGRAM(s) Oral every 6 hours PRN  dexmedetomidine Infusion 0.2 MICROgram(s)/kG/Hr IV Continuous <Continuous>  LORazepam   Injectable 2 milliGRAM(s) IV Push once PRN  PHENobarbital Injectable 130 milliGRAM(s) IV Push every 4 hours PRN  PHENobarbital Injectable 130 milliGRAM(s) IV Push every 6 hours      ## Vitals:  T(C): 36.6 (10-20-19 @ 19:14), Max: 36.8 (10-20-19 @ 04:12)  HR: 53 (10-20-19 @ 19:00) (50 - 82)  BP: 129/86 (10-20-19 @ 19:00) (104/66 - 144/80)  BP(mean): 96 (10-20-19 @ 19:00) (72 - 97)  RR: 19 (10-20-19 @ 19:00) (11 - 21)  SpO2: 100% (10-20-19 @ 19:00) (93% - 100%)  Wt(kg): --  Vent:   ABG:       10-19 @ 07:01  -  10-20 @ 07:00  --------------------------------------------------------  IN: 1164.4 mL / OUT: 400 mL / NET: 764.4 mL    10-20 @ 07:01  -  10-20 @ 20:38  --------------------------------------------------------  IN: 543.8 mL / OUT: 1500 mL / NET: -956.2 mL          ## P/E:  Gen: lying comfortably in bed in no apparent distress  HEENT: PERRL, EOMI  Resp: CTA B/L no c/r/w  CVS: S1S2 no m/r/g  Abd: soft NT/ND +BS  Ext: no c/c/e  Neuro: A&Ox3    CENTRAL LINE: [ ] YES [ ] NO  LOCATION:   DATE INSERTED:  REMOVE: [ ] YES [ ] NO      DUSTIN: [ ] YES [ ] NO    DATE INSERTED:  REMOVE:  [ ] YES [ ] NO      A-LINE:  [ ] YES [ ] NO  LOCATION:   DATE INSERTED:  REMOVE:  [ ] YES [ ] NO  EXPLAIN:    GLOBAL ISSUE/BEST PRACTICE:  Analgesia:  Sedation:  HOB elevation: yes  Stress ulcer prophylaxis:  VTE prophylaxis:  Oral Care:  Glycemic control:  Nutrition:    CODE STATUS: [ ] full code  [ ] DNR  [ ] DNI  [ ] [facility]  Goals of care discussion: [ ] yes HPI:  56M PMH chronic ETOH abuse/dependence with hx of alcohol withdrawal s/p frequent hospital admissions, GERD, HLD, alcoholic gastritis/esophagitis, PUD, hx of hypoxic respiratory failure requiring intubation due to aspiration PNA, staph PNA presents with c/o diffuse abdominal pain, weakness, N/V, and coffee ground emesis (scant/dark brown). Last drink drink was vodka morning of coming into hospital. Alcohol level 263 in ED, CIWA > 15 with tremors. Pt admitted to the ICU for ETOH withdrawal with consistently high CIWA score's despite tx with Valium 10 mg IV x2.  Elevated lactate.     24 hr events:  on precedex drip  CIWA 12  complaining of epigastric pain  no further emesis  no melena/hematochezia  choked on carafate pill this afternoon c/o he felt pills stuck in throat with pain in throat      ## ROS:  no HA, no dizziness  no visual disturbances  no hallucinations  no SOB, no cough  + throat pain  no CP, no palpitations  + epigastric abdominal pain  no nausea, no emesis  no coffee ground emesis, no melena, no hematochezia  no dysuria  no musculoskeletal pain       ## Labs:  CBC:                        11.9   5.19  )-----------( 137      ( 20 Oct 2019 03:47 )             37.4     Chem:  10-20    143  |  110<H>  |  15  ----------------------------<  85  3.9   |  22  |  0.65    Ca    7.8<L>      20 Oct 2019 03:47  Phos  2.5     10-20  Mg     1.7     10-20    TPro  6.3  /  Alb  3.1<L>  /  TBili  0.8  /  DBili  x   /  AST  70<H>  /  ALT  55  /  AlkPhos  37<L>  10-19    Coags:  PT/INR - ( 19 Oct 2019 15:32 )   PT: 10.8 sec;   INR: 0.97 ratio    PTT - ( 19 Oct 2019 15:32 )  PTT:22.6 sec    Lipase, Serum (10.19.19 @ 18:04)    Lipase, Serum: 237 U/L    Lactate, Blood (10.20.19 @ 03:47)    Lactate, Blood: 2.1 mmol/L        ## Imaging:  CT abdomen < from: CT Abdomen and Pelvis No Cont (10.19.19 @ 17:30) >  Distal esophageal and gastric wall thickening, question esophagitis and   gastritis.  Hepatic steatosis.  No bowel obstruction.      CT neck/chest < from: CT Neck Soft Tissue No Cont (10.20.19 @ 16:36) >  No evidence of a radiopaque density in the aerodigestive   tract.    Severe hepatic steatosis. No foreign body in the airways        ## Medications:  lactulose Syrup 10 Gram(s) Oral two times a day  pantoprazole  Injectable 40 milliGRAM(s) IV Push every 12 hours  sucralfate suspension 1 Gram(s) Oral every 6 hours    acetaminophen   Tablet .. 650 milliGRAM(s) Oral every 6 hours PRN  dexmedetomidine Infusion 0.2 MICROgram(s)/kG/Hr IV Continuous <Continuous>  LORazepam   Injectable 2 milliGRAM(s) IV Push once PRN  PHENobarbital Injectable 130 milliGRAM(s) IV Push every 4 hours PRN  PHENobarbital Injectable 130 milliGRAM(s) IV Push every 6 hours      ## Vitals:  T(C): 36.6 (10-20-19 @ 19:14), Max: 36.8 (10-20-19 @ 04:12)  HR: 53 (10-20-19 @ 19:00) (50 - 82)  BP: 129/86 (10-20-19 @ 19:00) (104/66 - 144/80)  BP(mean): 96 (10-20-19 @ 19:00) (72 - 97)  RR: 19 (10-20-19 @ 19:00) (11 - 21)  SpO2: 100% (10-20-19 @ 19:00) (93% - 100%)        10-19 @ 07:01  -  10-20 @ 07:00  --------------------------------------------------------  IN: 1164.4 mL / OUT: 400 mL / NET: 764.4 mL    10-20 @ 07:01  -  10-20 @ 20:38  --------------------------------------------------------  IN: 543.8 mL / OUT: 1500 mL / NET: -956.2 mL          ## P/E:  Gen: lying comfortably in bed in no apparent distress with precedex drip  HEENT: PERRL, EOMI  Resp: CTA B/L no rales, no rhonchi no wheeze, no stridor  CVS: rrr  Abd: soft mild epigastric tenderness to palpation, no guarding no rebound  Ext: no c/c/e  Neuro: A&Ox3, moves all extremities     CENTRAL LINE: [ ] YES [x] NO  LOCATION:   DATE INSERTED:  REMOVE: [ ] YES [ ] NO      CHAN: [ ] YES [x] NO    DATE INSERTED:  REMOVE:  [ ] YES [ ] NO      A-LINE:  [ ] YES [x] NO  LOCATION:   DATE INSERTED:  REMOVE:  [ ] YES [ ] NO  EXPLAIN:    GLOBAL ISSUE/BEST PRACTICE:  Analgesia: tylenol prn  Sedation: precedex   HOB elevation: yes  Stress ulcer prophylaxis: protoxin carafate   VTE prophylaxis: bilateral compression devices  Oral Care: n/a  Glycemic control: n/a  Nutrition: po diet    CODE STATUS: [x] full code  [ ] DNR  [ ] DNI  [ ] MOLST  Goals of care discussion: [ ] yes

## 2019-10-21 VITALS
OXYGEN SATURATION: 100 % | DIASTOLIC BLOOD PRESSURE: 61 MMHG | RESPIRATION RATE: 15 BRPM | HEART RATE: 64 BPM | SYSTOLIC BLOOD PRESSURE: 118 MMHG

## 2019-10-21 LAB
ALBUMIN SERPL ELPH-MCNC: 3.3 G/DL — SIGNIFICANT CHANGE UP (ref 3.3–5)
ALP SERPL-CCNC: 42 U/L — SIGNIFICANT CHANGE UP (ref 40–120)
ALT FLD-CCNC: 55 U/L — SIGNIFICANT CHANGE UP (ref 12–78)
ANION GAP SERPL CALC-SCNC: 8 MMOL/L — SIGNIFICANT CHANGE UP (ref 5–17)
AST SERPL-CCNC: 63 U/L — HIGH (ref 15–37)
BASOPHILS # BLD AUTO: 0.02 K/UL — SIGNIFICANT CHANGE UP (ref 0–0.2)
BASOPHILS NFR BLD AUTO: 0.6 % — SIGNIFICANT CHANGE UP (ref 0–2)
BILIRUB SERPL-MCNC: 1.2 MG/DL — SIGNIFICANT CHANGE UP (ref 0.2–1.2)
BUN SERPL-MCNC: 12 MG/DL — SIGNIFICANT CHANGE UP (ref 7–23)
CALCIUM SERPL-MCNC: 8.2 MG/DL — LOW (ref 8.5–10.1)
CHLORIDE SERPL-SCNC: 102 MMOL/L — SIGNIFICANT CHANGE UP (ref 96–108)
CO2 SERPL-SCNC: 26 MMOL/L — SIGNIFICANT CHANGE UP (ref 22–31)
CREAT SERPL-MCNC: 0.86 MG/DL — SIGNIFICANT CHANGE UP (ref 0.5–1.3)
EOSINOPHIL # BLD AUTO: 0.12 K/UL — SIGNIFICANT CHANGE UP (ref 0–0.5)
EOSINOPHIL NFR BLD AUTO: 3.7 % — SIGNIFICANT CHANGE UP (ref 0–6)
GLUCOSE SERPL-MCNC: 80 MG/DL — SIGNIFICANT CHANGE UP (ref 70–99)
HCT VFR BLD CALC: 38.5 % — LOW (ref 39–50)
HGB BLD-MCNC: 12.3 G/DL — LOW (ref 13–17)
IMM GRANULOCYTES NFR BLD AUTO: 0.3 % — SIGNIFICANT CHANGE UP (ref 0–1.5)
LACTATE SERPL-SCNC: 1 MMOL/L — SIGNIFICANT CHANGE UP (ref 0.7–2)
LIDOCAIN IGE QN: 214 U/L — SIGNIFICANT CHANGE UP (ref 73–393)
LYMPHOCYTES # BLD AUTO: 1.44 K/UL — SIGNIFICANT CHANGE UP (ref 1–3.3)
LYMPHOCYTES # BLD AUTO: 43.9 % — SIGNIFICANT CHANGE UP (ref 13–44)
MAGNESIUM SERPL-MCNC: 1.9 MG/DL — SIGNIFICANT CHANGE UP (ref 1.6–2.6)
MANUAL SMEAR VERIFICATION: YES — SIGNIFICANT CHANGE UP
MCHC RBC-ENTMCNC: 28.4 PG — SIGNIFICANT CHANGE UP (ref 27–34)
MCHC RBC-ENTMCNC: 31.9 GM/DL — LOW (ref 32–36)
MCV RBC AUTO: 88.9 FL — SIGNIFICANT CHANGE UP (ref 80–100)
MONOCYTES # BLD AUTO: 0.23 K/UL — SIGNIFICANT CHANGE UP (ref 0–0.9)
MONOCYTES NFR BLD AUTO: 7 % — SIGNIFICANT CHANGE UP (ref 2–14)
NEUTROPHILS # BLD AUTO: 1.46 K/UL — LOW (ref 1.8–7.4)
NEUTROPHILS NFR BLD AUTO: 44.5 % — SIGNIFICANT CHANGE UP (ref 43–77)
NRBC # BLD: 0 /100 WBCS — SIGNIFICANT CHANGE UP (ref 0–0)
PHOSPHATE SERPL-MCNC: 1.8 MG/DL — LOW (ref 2.5–4.5)
PLAT MORPH BLD: NORMAL — SIGNIFICANT CHANGE UP
PLATELET # BLD AUTO: 112 K/UL — LOW (ref 150–400)
POTASSIUM SERPL-MCNC: 3.9 MMOL/L — SIGNIFICANT CHANGE UP (ref 3.5–5.3)
POTASSIUM SERPL-SCNC: 3.9 MMOL/L — SIGNIFICANT CHANGE UP (ref 3.5–5.3)
PROT SERPL-MCNC: 6.8 GM/DL — SIGNIFICANT CHANGE UP (ref 6–8.3)
RBC # BLD: 4.33 M/UL — SIGNIFICANT CHANGE UP (ref 4.2–5.8)
RBC # FLD: 12.4 % — SIGNIFICANT CHANGE UP (ref 10.3–14.5)
RBC BLD AUTO: NORMAL — SIGNIFICANT CHANGE UP
SODIUM SERPL-SCNC: 136 MMOL/L — SIGNIFICANT CHANGE UP (ref 135–145)
WBC # BLD: 3.28 K/UL — LOW (ref 3.8–10.5)
WBC # FLD AUTO: 3.28 K/UL — LOW (ref 3.8–10.5)

## 2019-10-21 PROCEDURE — 36569 INSJ PICC 5 YR+ W/O IMAGING: CPT

## 2019-10-21 PROCEDURE — 99232 SBSQ HOSP IP/OBS MODERATE 35: CPT

## 2019-10-21 PROCEDURE — 76937 US GUIDE VASCULAR ACCESS: CPT | Mod: 26,59

## 2019-10-21 RX ORDER — MORPHINE SULFATE 50 MG/1
2 CAPSULE, EXTENDED RELEASE ORAL ONCE
Refills: 0 | Status: DISCONTINUED | OUTPATIENT
Start: 2019-10-21 | End: 2019-10-21

## 2019-10-21 RX ADMIN — Medication 130 MILLIGRAM(S): at 06:26

## 2019-10-21 RX ADMIN — LACTULOSE 10 GRAM(S): 10 SOLUTION ORAL at 06:26

## 2019-10-21 RX ADMIN — MORPHINE SULFATE 2 MILLIGRAM(S): 50 CAPSULE, EXTENDED RELEASE ORAL at 01:32

## 2019-10-21 RX ADMIN — Medication 1 GRAM(S): at 01:25

## 2019-10-21 RX ADMIN — Medication 1 MILLIGRAM(S): at 12:51

## 2019-10-21 RX ADMIN — CHLORHEXIDINE GLUCONATE 1 APPLICATION(S): 213 SOLUTION TOPICAL at 02:30

## 2019-10-21 RX ADMIN — Medication 85 MILLIMOLE(S): at 07:43

## 2019-10-21 RX ADMIN — Medication 130 MILLIGRAM(S): at 12:51

## 2019-10-21 RX ADMIN — MORPHINE SULFATE 2 MILLIGRAM(S): 50 CAPSULE, EXTENDED RELEASE ORAL at 08:17

## 2019-10-21 RX ADMIN — Medication 1 GRAM(S): at 12:52

## 2019-10-21 RX ADMIN — MORPHINE SULFATE 2 MILLIGRAM(S): 50 CAPSULE, EXTENDED RELEASE ORAL at 01:25

## 2019-10-21 RX ADMIN — Medication 1 GRAM(S): at 06:26

## 2019-10-21 RX ADMIN — MORPHINE SULFATE 2 MILLIGRAM(S): 50 CAPSULE, EXTENDED RELEASE ORAL at 08:32

## 2019-10-21 RX ADMIN — Medication 1 TABLET(S): at 12:51

## 2019-10-21 RX ADMIN — PANTOPRAZOLE SODIUM 40 MILLIGRAM(S): 20 TABLET, DELAYED RELEASE ORAL at 06:26

## 2019-10-21 NOTE — PROGRESS NOTE ADULT - REASON FOR ADMISSION
ETOH withdrawal with CIWA >15, dark hematemasis, abdominal discomfort with N/V
ETOH withdrawal with CIWA >15, dark hematemasis, abdominal discomfort with N/V

## 2019-10-21 NOTE — CHART NOTE - NSCHARTNOTEFT_GEN_A_CORE
Pt endorsing 10/10 abdominal pain, consistent with pancreatitis. 2mg IVP morphine ordered. HR sinus on tele 58-65, MAP 82. RR 14.

## 2019-10-21 NOTE — PROCEDURE NOTE - NSINFORMCONSENT_GEN_A_CORE
Benefits, risks, and possible complications of procedure explained to patient/caregiver who verbalized understanding and gave verbal consent.
Benefits, risks, and possible complications of procedure explained to patient/caregiver who verbalized understanding and gave verbal consent./Telephone consent with Lavelle Degroot

## 2019-10-21 NOTE — DISCHARGE NOTE PROVIDER - NSDCCPCAREPLAN_GEN_ALL_CORE_FT
PRINCIPAL DISCHARGE DIAGNOSIS  Diagnosis: Alcohol dependence with withdrawal  Assessment and Plan of Treatment:       SECONDARY DISCHARGE DIAGNOSES  Diagnosis: Esophagitis  Assessment and Plan of Treatment:     Diagnosis: Gastritis  Assessment and Plan of Treatment:     Diagnosis: Chronic alcohol abuse  Assessment and Plan of Treatment:

## 2019-10-21 NOTE — PROGRESS NOTE ADULT - ASSESSMENT
56M PMH chronic ETOH abuse/dependence with hx of alcohol withdrawal s/p frequent hospital admissions, GERD, HLD, alcoholic gastritis/esophagitis, PUD, hx of hypoxic respiratory failure requiring intubation due to aspiration PNA, staph PNA here with abdominal pain, lactic acidosis, alcohol withdrawal.    - pt awake alert responsive  - off precedex drip  - on standing phenobarbital  - lactic acidosis improved with IVF, type B lactic acidosis due to alcoholism 56M PMH chronic ETOH abuse/dependence with hx of alcohol withdrawal s/p frequent hospital admissions, GERD, HLD, alcoholic gastritis/esophagitis, PUD, hx of hypoxic respiratory failure requiring intubation due to aspiration PNA, staph PNA here with abdominal pain, lactic acidosis, alcohol withdrawal.    - pt awake alert responsive  - off precedex drip  - on standing phenobarbital  - lactic acidosis improved with IVF, type B lactic acidosis due to alcoholism  - CIWA today at 1, pt calm, cooperative, oriented x3  - on carafate and protonix for alcohol gastritis  - lipase within normal limits   - mvi, thiamine, folic acid  - pt states he wants to be discharged as he has a court date he cannot miss tomorrow  - offered to write a letter or make a phone call for pt stating he is hospitalized receiving active treatment at facility however pt declined and requested to sign out against medical advice  - pt with capacity  - risks of signing out against medical advice including death discussed with pt. pt states he is leaving and had son come pick pt up  - AMA papers signed 56M PMH chronic ETOH abuse/dependence with hx of alcohol withdrawal s/p frequent hospital admissions, GERD, HLD, alcoholic gastritis/esophagitis, PUD, hx of hypoxic respiratory failure requiring intubation due to aspiration PNA, staph PNA here with abdominal pain, lactic acidosis, alcohol withdrawal.    - pt awake alert responsive  - off precedex drip  - on standing phenobarbital  - lactic acidosis improved with IVF, type B lactic acidosis due to alcoholism  - CIWA today at 1, pt calm, cooperative, oriented x3  - on carafate and protonix for alcohol gastritis  - lipase within normal limits   - mvi, thiamine, folic acid  - supplement hypophosphatemia   - pt states he wants to be discharged as he has a court date he cannot miss tomorrow  - offered to write a letter or make a phone call for pt stating he is hospitalized receiving active treatment at facility however pt declined and requested to sign out against medical advice  - pt with capacity  - risks of signing out against medical advice including death discussed with pt. pt states he is leaving and had son come pick pt up  - AMA papers signed

## 2019-10-21 NOTE — PROCEDURE NOTE - NSPOSTCAREGUIDE_GEN_A_CORE
Care for catheter as per unit/ICU protocols/Instructed patient/caregiver regarding signs and symptoms of infection/Verbal/written post procedure instructions were given to patient/caregiver/Instructed patient/caregiver to follow-up with primary care physician/Keep the cast/splint/dressing clean and dry
Care for catheter as per unit/ICU protocols

## 2019-10-21 NOTE — PROCEDURE NOTE - ADDITIONAL PROCEDURE DETAILS
Patient seen at bedside for midline catheter placement.  Telephone consent obtained from patient's child Lavelle Degroot after risks/benefits/alternatives explained. ICU PA and RN witnessed consent. Pt agreed to midline as well.  Upper extremity prepped and draped in usual sterile fashion. Time out performed with RN. 4Fr 15 cm single lumen midline catheter placed using ultrasound guided seldinger technique, L basilic vein. Flushes well, with good venous return. Patient tolerated procedure well without complication and was left in no acute distress. Upper arm circumference noted to be 28cm.

## 2019-10-21 NOTE — DIETITIAN INITIAL EVALUATION ADULT. - DIET TYPE
Adv po di9et when medically feasible Clear liquids to full liquid to soft/Ensure Enlive 2 x day(700kcal & 40gm protein)

## 2019-10-21 NOTE — PROCEDURE NOTE - NSPROCDETAILS_GEN_ALL_CORE
supine position/ultrasound assessment/sterile dressing applied/ultrasound guidance/location identified, draped/prepped, sterile technique used/sterile technique, catheter placed

## 2019-10-21 NOTE — DIETITIAN INITIAL EVALUATION ADULT. - PERTINENT MEDS FT
acetaminophen   Tablet .. PRN  chlorhexidine 4% Liquid  dexmedetomidine Infusion  folic acid  lactulose Syrup  LORazepam   Injectable PRN  multivitamin  pantoprazole  Injectable  PHENobarbital Injectable PRN  PHENobarbital Injectable  sucralfate suspension

## 2019-10-21 NOTE — PROGRESS NOTE ADULT - SUBJECTIVE AND OBJECTIVE BOX
HPI:  57 yo male with PMH ETOH w/d (s/p frequent hospital admissions), GERD, HLD, alcoholic gastritis/esophagitis, PUD presented to [facility] ED with c/o diffuse abdominal pain, chest pain, generalized body pain, weakness, N/V, and hematemasis (scant/dark brown). Pt reports he had last drink 2 days prior, however, before coming here today he admits to drinking vodka (unknown amount; alcohol level 263 in ED). Pt admitted to the ICU for ETOH withdrawal with consistently high CIWA score's despite tx with Valium 10 mg IV x2, tender abdomen, and ETOH ketoacidosis which has been improving with IVF. No signs of hypoperfusion noted. Patient denies fever, chills, SOB, palpitations, dizziness, rash, or dysuria. (19 Oct 2019 18:56)      24 hr events:      ## ROS:  [ ] unable to obtain  CONSTITUTIONAL: No fever, weight loss, or fatigue  EYES: No eye pain, visual disturbances, or discharge  ENMT:  No difficulty hearing, tinnitus, vertigo; No sinus or throat pain  NECK: No pain or stiffness  RESPIRATORY: No cough, wheezing, chills or hemoptysis; No shortness of breath  CARDIOVASCULAR: No chest pain, palpitations, dizziness, or leg swelling  GASTROINTESTINAL: No abdominal or epigastric pain. No nausea, vomiting, or hematemesis; No diarrhea or constipation. No melena or hematochezia.  GENITOURINARY: No dysuria, frequency, hematuria, or incontinence  NEUROLOGICAL: No headaches, memory loss, loss of strength, numbness, or tremors  SKIN: No itching, burning, rashes, or lesions   LYMPH NODES: No enlarged glands  ENDOCRINE: No heat or cold intolerance; No hair loss  MUSCULOSKELETAL: No joint pain or swelling; No muscle, back, or extremity pain  PSYCHIATRIC: No depression, anxiety, mood swings, or difficulty sleeping  HEME/LYMPH: No easy bruising, or bleeding gums  ALLERGY AND IMMUNOLOGIC: No hives or eczema    ## Labs:  CBC:                        12.3   3.28  )-----------( 112      ( 21 Oct 2019 03:03 )             38.5     Chem:  10-21    136  |  102  |  12  ----------------------------<  80  3.9   |  26  |  0.86    Ca    8.2<L>      21 Oct 2019 03:03  Phos  1.8     10-21  Mg     1.9     10-21    TPro  6.8  /  Alb  3.3  /  TBili  1.2  /  DBili  x   /  AST  63<H>  /  ALT  55  /  AlkPhos  42  10-21    Coags:          ## Imaging:    ## Medications:            lactulose Syrup 10 Gram(s) Oral two times a day  pantoprazole  Injectable 40 milliGRAM(s) IV Push every 12 hours  sucralfate suspension 1 Gram(s) Oral every 6 hours    acetaminophen   Tablet .. 650 milliGRAM(s) Oral every 6 hours PRN  dexmedetomidine Infusion 0.2 MICROgram(s)/kG/Hr IV Continuous <Continuous>  LORazepam   Injectable 2 milliGRAM(s) IV Push once PRN  PHENobarbital Injectable 130 milliGRAM(s) IV Push every 4 hours PRN  PHENobarbital Injectable 130 milliGRAM(s) IV Push every 6 hours      ## Vitals:  T(C): 36.7 (10-21-19 @ 15:34), Max: 37.1 (10-20-19 @ 23:35)  HR: 64 (10-21-19 @ 16:00) (50 - 87)  BP: 118/61 (10-21-19 @ 16:00) (104/60 - 158/86)  BP(mean): 76 (10-21-19 @ 16:00) (58 - 110)  RR: 15 (10-21-19 @ 16:00) (11 - 19)  SpO2: 100% (10-21-19 @ 16:00) (96% - 100%)  Wt(kg): --  Vent:   ABG:       10-20 @ 07:01  -  10-21 @ 07:00  --------------------------------------------------------  IN: 581 mL / OUT: 1500 mL / NET: -919 mL    10-21 @ 07:01  -  10-21 @ 16:35  --------------------------------------------------------  IN: 500 mL / OUT: 450 mL / NET: 50 mL          ## P/E:  Gen: lying comfortably in bed in no apparent distress  HEENT: PERRL, EOMI  Resp: CTA B/L no c/r/w  CVS: S1S2 no m/r/g  Abd: soft NT/ND +BS  Ext: no c/c/e  Neuro: A&Ox3    CENTRAL LINE: [ ] YES [ ] NO  LOCATION:   DATE INSERTED:  REMOVE: [ ] YES [ ] NO      CHAN: [ ] YES [ ] NO    DATE INSERTED:  REMOVE:  [ ] YES [ ] NO      A-LINE:  [ ] YES [ ] NO  LOCATION:   DATE INSERTED:  REMOVE:  [ ] YES [ ] NO  EXPLAIN:    GLOBAL ISSUE/BEST PRACTICE:  Analgesia:  Sedation:  HOB elevation: yes  Stress ulcer prophylaxis:  VTE prophylaxis:  Oral Care:  Glycemic control:  Nutrition:    CODE STATUS: [ ] full code  [ ] DNR  [ ] DNI  [ ] MOLST  Goals of care discussion: [ ] yes HPI:  56M PMH chronic ETOH abuse/dependence with hx of alcohol withdrawal s/p frequent hospital admissions, GERD, HLD, alcoholic gastritis/esophagitis, PUD, hx of hypoxic respiratory failure requiring intubation due to aspiration PNA, staph PNA presents with c/o diffuse abdominal pain, weakness, N/V, and coffee ground emesis (scant/dark brown). Last drink drink was vodka morning of coming into hospital. Alcohol level 263 in ED, CIWA > 15 with tremors. Pt admitted to the ICU for ETOH withdrawal with consistently high CIWA score's despite tx with Valium 10 mg IV x2.  Elevated lactate.     24 hr events:      ## ROS:  [ ] unable to obtain  CONSTITUTIONAL: No fever, weight loss, or fatigue  EYES: No eye pain, visual disturbances, or discharge  ENMT:  No difficulty hearing, tinnitus, vertigo; No sinus or throat pain  NECK: No pain or stiffness  RESPIRATORY: No cough, wheezing, chills or hemoptysis; No shortness of breath  CARDIOVASCULAR: No chest pain, palpitations, dizziness, or leg swelling  GASTROINTESTINAL: No abdominal or epigastric pain. No nausea, vomiting, or hematemesis; No diarrhea or constipation. No melena or hematochezia.  GENITOURINARY: No dysuria, frequency, hematuria, or incontinence  NEUROLOGICAL: No headaches, memory loss, loss of strength, numbness, or tremors  SKIN: No itching, burning, rashes, or lesions   LYMPH NODES: No enlarged glands  ENDOCRINE: No heat or cold intolerance; No hair loss  MUSCULOSKELETAL: No joint pain or swelling; No muscle, back, or extremity pain  PSYCHIATRIC: No depression, anxiety, mood swings, or difficulty sleeping  HEME/LYMPH: No easy bruising, or bleeding gums  ALLERGY AND IMMUNOLOGIC: No hives or eczema    ## Labs:  CBC:                        12.3   3.28  )-----------( 112      ( 21 Oct 2019 03:03 )             38.5     Chem:  10-21    136  |  102  |  12  ----------------------------<  80  3.9   |  26  |  0.86    Ca    8.2<L>      21 Oct 2019 03:03  Phos  1.8     10-21  Mg     1.9     10-21    TPro  6.8  /  Alb  3.3  /  TBili  1.2  /  DBili  x   /  AST  63<H>  /  ALT  55  /  AlkPhos  42  10-21    Coags:          ## Imaging:    ## Medications:            lactulose Syrup 10 Gram(s) Oral two times a day  pantoprazole  Injectable 40 milliGRAM(s) IV Push every 12 hours  sucralfate suspension 1 Gram(s) Oral every 6 hours    acetaminophen   Tablet .. 650 milliGRAM(s) Oral every 6 hours PRN  dexmedetomidine Infusion 0.2 MICROgram(s)/kG/Hr IV Continuous <Continuous>  LORazepam   Injectable 2 milliGRAM(s) IV Push once PRN  PHENobarbital Injectable 130 milliGRAM(s) IV Push every 4 hours PRN  PHENobarbital Injectable 130 milliGRAM(s) IV Push every 6 hours      ## Vitals:  T(C): 36.7 (10-21-19 @ 15:34), Max: 37.1 (10-20-19 @ 23:35)  HR: 64 (10-21-19 @ 16:00) (50 - 87)  BP: 118/61 (10-21-19 @ 16:00) (104/60 - 158/86)  BP(mean): 76 (10-21-19 @ 16:00) (58 - 110)  RR: 15 (10-21-19 @ 16:00) (11 - 19)  SpO2: 100% (10-21-19 @ 16:00) (96% - 100%)  Wt(kg): --  Vent:   ABG:       10-20 @ 07:01  -  10-21 @ 07:00  --------------------------------------------------------  IN: 581 mL / OUT: 1500 mL / NET: -919 mL    10-21 @ 07:01  -  10-21 @ 16:35  --------------------------------------------------------  IN: 500 mL / OUT: 450 mL / NET: 50 mL          ## P/E:  Gen: lying comfortably in bed in no apparent distress  HEENT: PERRL, EOMI  Resp: CTA B/L no c/r/w  CVS: S1S2 no m/r/g  Abd: soft NT/ND +BS  Ext: no c/c/e  Neuro: A&Ox3    CENTRAL LINE: [ ] YES [ ] NO  LOCATION:   DATE INSERTED:  REMOVE: [ ] YES [ ] NO      CHAN: [ ] YES [ ] NO    DATE INSERTED:  REMOVE:  [ ] YES [ ] NO      A-LINE:  [ ] YES [ ] NO  LOCATION:   DATE INSERTED:  REMOVE:  [ ] YES [ ] NO  EXPLAIN:    GLOBAL ISSUE/BEST PRACTICE:  Analgesia:  Sedation:  HOB elevation: yes  Stress ulcer prophylaxis:  VTE prophylaxis:  Oral Care:  Glycemic control:  Nutrition:    CODE STATUS: [ ] full code  [ ] DNR  [ ] DNI  [ ] MOLST  Goals of care discussion: [ ] yes HPI:  56M PMH chronic ETOH abuse/dependence with hx of alcohol withdrawal s/p frequent hospital admissions, GERD, HLD, alcoholic gastritis/esophagitis, PUD, hx of hypoxic respiratory failure requiring intubation due to aspiration PNA, staph PNA presents with c/o diffuse abdominal pain, weakness, N/V, and coffee ground emesis (scant/dark brown). Last drink drink was vodka morning of coming into hospital. Alcohol level 263 in ED, CIWA > 15 with tremors. Pt admitted to the ICU for ETOH withdrawal with consistently high CIWA score's despite tx with Valium 10 mg IV x2.  Elevated lactate.     24 hr events:  no acute events overnight  off precedex drip  no hallucinations  calm cooperative  CIWA has been 1    ## ROS:  no HA, no dizziness  no visual disturbances  no hallucinations  no SOB, no cough  no sore throat  no CP, no palpitations  + epigastric abdominal pain  no nausea, no emesis  no coffee ground emesis, no melena, no hematochezia  no dysuria  no musculoskeletal pain       ## Labs:  CBC:                        12.3   3.28  )-----------( 112      ( 21 Oct 2019 03:03 )             38.5     Chem:  10-21    136  |  102  |  12  ----------------------------<  80  3.9   |  26  |  0.86    Ca    8.2<L>      21 Oct 2019 03:03  Phos  1.8     10-21  Mg     1.9     10-21    TPro  6.8  /  Alb  3.3  /  TBili  1.2  /  AST  63<H>  /  ALT  55  /  AlkPhos  42  10-21      Lipase, Serum (10.21.19 @ 03:03)    Lipase, Serum: 214 U/L      ## Imaging:  CXR < from: Xray Chest 1 View- PORTABLE-Urgent (10.19.19 @ 16:20) >  Clear lungs.        ## Medications:    lactulose Syrup 10 Gram(s) Oral two times a day  pantoprazole  Injectable 40 milliGRAM(s) IV Push every 12 hours  sucralfate suspension 1 Gram(s) Oral every 6 hours    acetaminophen   Tablet .. 650 milliGRAM(s) Oral every 6 hours PRN    PHENobarbital Injectable 130 milliGRAM(s) IV Push every 4 hours PRN  PHENobarbital Injectable 130 milliGRAM(s) IV Push every 6 hours      ## Vitals:  T(C): 36.7 (10-21-19 @ 15:34), Max: 37.1 (10-20-19 @ 23:35)  HR: 64 (10-21-19 @ 16:00) (50 - 87)  BP: 118/61 (10-21-19 @ 16:00) (104/60 - 158/86)  BP(mean): 76 (10-21-19 @ 16:00) (58 - 110)  RR: 15 (10-21-19 @ 16:00) (11 - 19)  SpO2: 100% (10-21-19 @ 16:00) (96% - 100%)        10-20 @ 07:01  -  10-21 @ 07:00  --------------------------------------------------------  IN: 581 mL / OUT: 1500 mL / NET: -919 mL    10-21 @ 07:01  -  10-21 @ 16:35  --------------------------------------------------------  IN: 500 mL / OUT: 450 mL / NET: 50 mL          ## P/E:  Gen: lying comfortably in bed in no apparent distress, alert, responsive  HEENT: PERRL, EOMI  Resp: CTA B/L   CVS: RRR  Abd: soft ND +BS, no guarding, no rebound  Ext: no c/c/e  Neuro: A&Ox3, no tremors, follows commands, answering questions appropriately    CENTRAL LINE: [ ] YES [x] NO  LOCATION:   DATE INSERTED:  REMOVE: [ ] YES [ ] NO      CHAN: [ ] YES [x] NO    DATE INSERTED:  REMOVE:  [ ] YES [ ] NO      A-LINE:  [ ] YES [x] NO  LOCATION:   DATE INSERTED:  REMOVE:  [ ] YES [ ] NO  EXPLAIN:    GLOBAL ISSUE/BEST PRACTICE:  Analgesia: Tylenol prn  Sedation: n/a  HOB elevation: yes  Stress ulcer prophylaxis: protonix  VTE prophylaxis: bilateral compression devices  Oral Care: n/a  Glycemic control: n/a  Nutrition: po diet    CODE STATUS: [x] full code  [ ] DNR  [ ] DNI  [ ] MOLST  Goals of care discussion: [ ] yes

## 2019-10-21 NOTE — DIETITIAN INITIAL EVALUATION ADULT. - PERTINENT LABORATORY DATA
10-21 Na 136 mmol/L Glu 80 mg/dL K+ 3.9 mmol/L Cr 0.86 mg/dL BUN 12 mg/dL Phos 1.8 mg/dL<L> Alb 3.3 g/dL PAB n/a   Hgb 12.3 g/dL<L> Hct 38.5 %<L> HgA1C n/a    Glucose, Serum: 80 mg/dL   24Hr FS:

## 2019-10-21 NOTE — DIETITIAN INITIAL EVALUATION ADULT. - OTHER INFO
Pt lives with wife who does cooking & food shopping. Pt states eating no Beef or pork. Pt consumes Regular diet; eats fish, eggs, dairy, chicken & vegetables. Pt with hx ETOHA with frequent hospitalizations. Pt with dark hematemesis, abdominal discomfort with N/V. Pt NPO x 2 days & presents with ETOH withdrawal, high CIWA & ETOH ketoacidosis. Pt reports last BM x 1(10/19).    UBW obtained from previous dietitian's assessment in EMR.

## 2019-10-24 DIAGNOSIS — R10.9 UNSPECIFIED ABDOMINAL PAIN: ICD-10-CM
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
## 2019-10-24 DIAGNOSIS — E78.2 MIXED HYPERLIPIDEMIA: ICD-10-CM

## 2019-10-24 DIAGNOSIS — Z79.899 OTHER LONG TERM (CURRENT) DRUG THERAPY: ICD-10-CM

## 2019-10-24 DIAGNOSIS — F10.239 ALCOHOL DEPENDENCE WITH WITHDRAWAL, UNSPECIFIED: ICD-10-CM

## 2019-10-24 DIAGNOSIS — K29.21 ALCOHOLIC GASTRITIS WITH BLEEDING: ICD-10-CM

## 2019-10-24 DIAGNOSIS — K21.0 GASTRO-ESOPHAGEAL REFLUX DISEASE WITH ESOPHAGITIS: ICD-10-CM

## 2019-10-24 DIAGNOSIS — E87.2 ACIDOSIS: ICD-10-CM

## 2019-10-29 ENCOUNTER — INPATIENT (INPATIENT)
Facility: HOSPITAL | Age: 54
LOS: 1 days | Discharge: LEFT BEFORE DISCHARGE | End: 2019-10-31
Attending: INTERNAL MEDICINE | Admitting: INTERNAL MEDICINE
Payer: MEDICAID

## 2019-10-29 VITALS
HEART RATE: 134 BPM | HEIGHT: 67 IN | SYSTOLIC BLOOD PRESSURE: 124 MMHG | RESPIRATION RATE: 17 BRPM | WEIGHT: 156.97 LBS | TEMPERATURE: 99 F | DIASTOLIC BLOOD PRESSURE: 86 MMHG | OXYGEN SATURATION: 96 %

## 2019-10-29 DIAGNOSIS — Z29.9 ENCOUNTER FOR PROPHYLACTIC MEASURES, UNSPECIFIED: ICD-10-CM

## 2019-10-29 DIAGNOSIS — I10 ESSENTIAL (PRIMARY) HYPERTENSION: ICD-10-CM

## 2019-10-29 DIAGNOSIS — F10.230 ALCOHOL DEPENDENCE WITH WITHDRAWAL, UNCOMPLICATED: ICD-10-CM

## 2019-10-29 DIAGNOSIS — E87.6 HYPOKALEMIA: ICD-10-CM

## 2019-10-29 DIAGNOSIS — K27.9 PEPTIC ULCER, SITE UNSPECIFIED, UNSPECIFIED AS ACUTE OR CHRONIC, WITHOUT HEMORRHAGE OR PERFORATION: ICD-10-CM

## 2019-10-29 DIAGNOSIS — E78.5 HYPERLIPIDEMIA, UNSPECIFIED: ICD-10-CM

## 2019-10-29 DIAGNOSIS — K52.9 NONINFECTIVE GASTROENTERITIS AND COLITIS, UNSPECIFIED: ICD-10-CM

## 2019-10-29 LAB
ALBUMIN SERPL ELPH-MCNC: 4.6 G/DL — SIGNIFICANT CHANGE UP (ref 3.3–5)
ALP SERPL-CCNC: 60 U/L — SIGNIFICANT CHANGE UP (ref 40–120)
ALT FLD-CCNC: 76 U/L — SIGNIFICANT CHANGE UP (ref 12–78)
ANION GAP SERPL CALC-SCNC: 17 MMOL/L — SIGNIFICANT CHANGE UP (ref 5–17)
APPEARANCE UR: CLEAR — SIGNIFICANT CHANGE UP
AST SERPL-CCNC: 58 U/L — HIGH (ref 15–37)
BACTERIA # UR AUTO: ABNORMAL
BASOPHILS # BLD AUTO: 0.03 K/UL — SIGNIFICANT CHANGE UP (ref 0–0.2)
BASOPHILS NFR BLD AUTO: 0.5 % — SIGNIFICANT CHANGE UP (ref 0–2)
BILIRUB SERPL-MCNC: 0.4 MG/DL — SIGNIFICANT CHANGE UP (ref 0.2–1.2)
BILIRUB UR-MCNC: NEGATIVE — SIGNIFICANT CHANGE UP
BLD GP AB SCN SERPL QL: SIGNIFICANT CHANGE UP
BUN SERPL-MCNC: 11 MG/DL — SIGNIFICANT CHANGE UP (ref 7–23)
CALCIUM SERPL-MCNC: 10.5 MG/DL — HIGH (ref 8.5–10.1)
CHLORIDE SERPL-SCNC: 87 MMOL/L — LOW (ref 96–108)
CO2 SERPL-SCNC: 31 MMOL/L — SIGNIFICANT CHANGE UP (ref 22–31)
COLOR SPEC: YELLOW — SIGNIFICANT CHANGE UP
CREAT SERPL-MCNC: 1.24 MG/DL — SIGNIFICANT CHANGE UP (ref 0.5–1.3)
DIFF PNL FLD: ABNORMAL
EOSINOPHIL # BLD AUTO: 0 K/UL — SIGNIFICANT CHANGE UP (ref 0–0.5)
EOSINOPHIL NFR BLD AUTO: 0 % — SIGNIFICANT CHANGE UP (ref 0–6)
EPI CELLS # UR: SIGNIFICANT CHANGE UP
GLUCOSE SERPL-MCNC: 127 MG/DL — HIGH (ref 70–99)
GLUCOSE UR QL: 100 MG/DL
HCT VFR BLD CALC: 46.3 % — SIGNIFICANT CHANGE UP (ref 39–50)
HGB BLD-MCNC: 15.3 G/DL — SIGNIFICANT CHANGE UP (ref 13–17)
IMM GRANULOCYTES NFR BLD AUTO: 0.5 % — SIGNIFICANT CHANGE UP (ref 0–1.5)
KETONES UR-MCNC: ABNORMAL
LEUKOCYTE ESTERASE UR-ACNC: NEGATIVE — SIGNIFICANT CHANGE UP
LIDOCAIN IGE QN: 389 U/L — SIGNIFICANT CHANGE UP (ref 73–393)
LYMPHOCYTES # BLD AUTO: 0.79 K/UL — LOW (ref 1–3.3)
LYMPHOCYTES # BLD AUTO: 12.5 % — LOW (ref 13–44)
MCHC RBC-ENTMCNC: 28 PG — SIGNIFICANT CHANGE UP (ref 27–34)
MCHC RBC-ENTMCNC: 33 GM/DL — SIGNIFICANT CHANGE UP (ref 32–36)
MCV RBC AUTO: 84.8 FL — SIGNIFICANT CHANGE UP (ref 80–100)
MONOCYTES # BLD AUTO: 0.81 K/UL — SIGNIFICANT CHANGE UP (ref 0–0.9)
MONOCYTES NFR BLD AUTO: 12.8 % — SIGNIFICANT CHANGE UP (ref 2–14)
NEUTROPHILS # BLD AUTO: 4.67 K/UL — SIGNIFICANT CHANGE UP (ref 1.8–7.4)
NEUTROPHILS NFR BLD AUTO: 73.7 % — SIGNIFICANT CHANGE UP (ref 43–77)
NITRITE UR-MCNC: NEGATIVE — SIGNIFICANT CHANGE UP
NRBC # BLD: 0 /100 WBCS — SIGNIFICANT CHANGE UP (ref 0–0)
PH UR: 9 — HIGH (ref 5–8)
PLATELET # BLD AUTO: 248 K/UL — SIGNIFICANT CHANGE UP (ref 150–400)
POTASSIUM SERPL-MCNC: 3.2 MMOL/L — LOW (ref 3.5–5.3)
POTASSIUM SERPL-SCNC: 3.2 MMOL/L — LOW (ref 3.5–5.3)
PROT SERPL-MCNC: 9.5 GM/DL — HIGH (ref 6–8.3)
PROT UR-MCNC: 100 MG/DL
RBC # BLD: 5.46 M/UL — SIGNIFICANT CHANGE UP (ref 4.2–5.8)
RBC # FLD: 13.4 % — SIGNIFICANT CHANGE UP (ref 10.3–14.5)
RBC CASTS # UR COMP ASSIST: ABNORMAL /HPF (ref 0–4)
SODIUM SERPL-SCNC: 135 MMOL/L — SIGNIFICANT CHANGE UP (ref 135–145)
SP GR SPEC: 1.01 — SIGNIFICANT CHANGE UP (ref 1.01–1.02)
TROPONIN I SERPL-MCNC: <.015 NG/ML — SIGNIFICANT CHANGE UP (ref 0.01–0.04)
UROBILINOGEN FLD QL: NEGATIVE MG/DL — SIGNIFICANT CHANGE UP
WBC # BLD: 6.33 K/UL — SIGNIFICANT CHANGE UP (ref 3.8–10.5)
WBC # FLD AUTO: 6.33 K/UL — SIGNIFICANT CHANGE UP (ref 3.8–10.5)
WBC UR QL: SIGNIFICANT CHANGE UP

## 2019-10-29 PROCEDURE — 74177 CT ABD & PELVIS W/CONTRAST: CPT | Mod: 26

## 2019-10-29 PROCEDURE — 99285 EMERGENCY DEPT VISIT HI MDM: CPT

## 2019-10-29 PROCEDURE — 99222 1ST HOSP IP/OBS MODERATE 55: CPT

## 2019-10-29 RX ORDER — METOPROLOL TARTRATE 50 MG
25 TABLET ORAL
Refills: 0 | Status: DISCONTINUED | OUTPATIENT
Start: 2019-10-29 | End: 2019-10-31

## 2019-10-29 RX ORDER — SODIUM CHLORIDE 9 MG/ML
1000 INJECTION INTRAMUSCULAR; INTRAVENOUS; SUBCUTANEOUS
Refills: 0 | Status: DISCONTINUED | OUTPATIENT
Start: 2019-10-29 | End: 2019-10-31

## 2019-10-29 RX ORDER — CIPROFLOXACIN LACTATE 400MG/40ML
400 VIAL (ML) INTRAVENOUS ONCE
Refills: 0 | Status: COMPLETED | OUTPATIENT
Start: 2019-10-29 | End: 2019-10-29

## 2019-10-29 RX ORDER — PANTOPRAZOLE SODIUM 20 MG/1
40 TABLET, DELAYED RELEASE ORAL ONCE
Refills: 0 | Status: COMPLETED | OUTPATIENT
Start: 2019-10-29 | End: 2019-10-29

## 2019-10-29 RX ORDER — MORPHINE SULFATE 50 MG/1
2 CAPSULE, EXTENDED RELEASE ORAL EVERY 6 HOURS
Refills: 0 | Status: DISCONTINUED | OUTPATIENT
Start: 2019-10-29 | End: 2019-10-31

## 2019-10-29 RX ORDER — FOLIC ACID 0.8 MG
1 TABLET ORAL DAILY
Refills: 0 | Status: DISCONTINUED | OUTPATIENT
Start: 2019-10-29 | End: 2019-10-31

## 2019-10-29 RX ORDER — SODIUM CHLORIDE 9 MG/ML
1000 INJECTION INTRAMUSCULAR; INTRAVENOUS; SUBCUTANEOUS ONCE
Refills: 0 | Status: COMPLETED | OUTPATIENT
Start: 2019-10-29 | End: 2019-10-29

## 2019-10-29 RX ORDER — METRONIDAZOLE 500 MG
500 TABLET ORAL ONCE
Refills: 0 | Status: COMPLETED | OUTPATIENT
Start: 2019-10-29 | End: 2019-10-29

## 2019-10-29 RX ORDER — ONDANSETRON 8 MG/1
4 TABLET, FILM COATED ORAL EVERY 8 HOURS
Refills: 0 | Status: DISCONTINUED | OUTPATIENT
Start: 2019-10-29 | End: 2019-10-31

## 2019-10-29 RX ORDER — ONDANSETRON 8 MG/1
4 TABLET, FILM COATED ORAL ONCE
Refills: 0 | Status: COMPLETED | OUTPATIENT
Start: 2019-10-29 | End: 2019-10-29

## 2019-10-29 RX ORDER — POTASSIUM CHLORIDE 20 MEQ
10 PACKET (EA) ORAL
Refills: 0 | Status: COMPLETED | OUTPATIENT
Start: 2019-10-29 | End: 2019-10-29

## 2019-10-29 RX ORDER — PANTOPRAZOLE SODIUM 20 MG/1
40 TABLET, DELAYED RELEASE ORAL DAILY
Refills: 0 | Status: DISCONTINUED | OUTPATIENT
Start: 2019-10-29 | End: 2019-10-31

## 2019-10-29 RX ORDER — METOCLOPRAMIDE HCL 10 MG
10 TABLET ORAL ONCE
Refills: 0 | Status: COMPLETED | OUTPATIENT
Start: 2019-10-29 | End: 2019-10-29

## 2019-10-29 RX ORDER — MORPHINE SULFATE 50 MG/1
4 CAPSULE, EXTENDED RELEASE ORAL ONCE
Refills: 0 | Status: DISCONTINUED | OUTPATIENT
Start: 2019-10-29 | End: 2019-10-29

## 2019-10-29 RX ORDER — SIMVASTATIN 20 MG/1
20 TABLET, FILM COATED ORAL AT BEDTIME
Refills: 0 | Status: DISCONTINUED | OUTPATIENT
Start: 2019-10-29 | End: 2019-10-31

## 2019-10-29 RX ORDER — DIAZEPAM 5 MG
10 TABLET ORAL ONCE
Refills: 0 | Status: DISCONTINUED | OUTPATIENT
Start: 2019-10-29 | End: 2019-10-29

## 2019-10-29 RX ORDER — THIAMINE MONONITRATE (VIT B1) 100 MG
100 TABLET ORAL DAILY
Refills: 0 | Status: DISCONTINUED | OUTPATIENT
Start: 2019-10-29 | End: 2019-10-31

## 2019-10-29 RX ORDER — POTASSIUM CHLORIDE 20 MEQ
40 PACKET (EA) ORAL ONCE
Refills: 0 | Status: COMPLETED | OUTPATIENT
Start: 2019-10-29 | End: 2019-10-30

## 2019-10-29 RX ADMIN — MORPHINE SULFATE 4 MILLIGRAM(S): 50 CAPSULE, EXTENDED RELEASE ORAL at 18:15

## 2019-10-29 RX ADMIN — Medication 100 MILLIEQUIVALENT(S): at 20:55

## 2019-10-29 RX ADMIN — Medication 100 MILLIGRAM(S): at 21:50

## 2019-10-29 RX ADMIN — Medication 10 MILLIGRAM(S): at 21:27

## 2019-10-29 RX ADMIN — SODIUM CHLORIDE 1000 MILLILITER(S): 9 INJECTION INTRAMUSCULAR; INTRAVENOUS; SUBCUTANEOUS at 18:15

## 2019-10-29 RX ADMIN — Medication 10 MILLIGRAM(S): at 19:50

## 2019-10-29 RX ADMIN — Medication 2 MILLIGRAM(S): at 23:59

## 2019-10-29 RX ADMIN — Medication 100 MILLIEQUIVALENT(S): at 19:52

## 2019-10-29 RX ADMIN — PANTOPRAZOLE SODIUM 40 MILLIGRAM(S): 20 TABLET, DELAYED RELEASE ORAL at 20:32

## 2019-10-29 RX ADMIN — Medication 200 MILLIGRAM(S): at 22:39

## 2019-10-29 RX ADMIN — ONDANSETRON 4 MILLIGRAM(S): 8 TABLET, FILM COATED ORAL at 18:15

## 2019-10-29 NOTE — ED ADULT TRIAGE NOTE - CHIEF COMPLAINT QUOTE
as per pt " one week of abdominal pain 10/10 pain and all over. I cant eat anything I keep vomiting." hx etoh last drink was yesterday.

## 2019-10-29 NOTE — ED PROVIDER NOTE - OBJECTIVE STATEMENT
53 yo male with PMH EtOH, gastritis, HLD presents to ED for evalaution of abd pain with NB diarrhea x 2 days. Patient reports that pain is constant, generalized, nausea with NBNB vomiting. Patient reports last drink was yesterday. Denies fevers, chills, chest pain, shortness of breath. Denies urinary symptoms.

## 2019-10-29 NOTE — H&P ADULT - NSHPLABSRESULTS_GEN_ALL_CORE
Recent Vitals  T(C): 36.8 (10-29-19 @ 19:59), Max: 37.1 (10-29-19 @ 16:50)  HR: 84 (10-29-19 @ 19:59) (84 - 134)  BP: 124/71 (10-29-19 @ 19:59) (124/71 - 124/86)  RR: 17 (10-29-19 @ 19:59) (17 - 17)  SpO2: 100% (10-29-19 @ 19:59) (96% - 100%)                        15.3   6.33  )-----------( 248      ( 29 Oct 2019 17:36 )             46.3     10-29    135  |  87<L>  |  11  ----------------------------<  127<H>  3.2<L>   |  31  |  1.24    Ca    10.5<H>      29 Oct 2019 17:36    TPro  9.5<H>  /  Alb  4.6  /  TBili  0.4  /  DBili  x   /  AST  58<H>  /  ALT  76  /  AlkPhos  60  10-29      LIVER FUNCTIONS - ( 29 Oct 2019 17:36 )  Alb: 4.6 g/dL / Pro: 9.5 gm/dL / ALK PHOS: 60 U/L / ALT: 76 U/L / AST: 58 U/L / GGT: x           Urinalysis Basic - ( 29 Oct 2019 19:43 )    Color: Yellow / Appearance: Clear / S.015 / pH: x  Gluc: x / Ketone: Trace  / Bili: Negative / Urobili: Negative mg/dL   Blood: x / Protein: 100 mg/dL / Nitrite: Negative   Leuk Esterase: Negative / RBC: 3-5 /HPF / WBC 3-5   Sq Epi: x / Non Sq Epi: Few / Bacteria: Few        Home Medications:    CT Abdomen: Absence of enteric contrast limits evaluation of the GI tract.  Fluid-filled nondistended small bowel consistent with enteritis.  Thickened distal esophagus. Recommend endoscopy.  Hepatomegaly and hepatic steatosis.

## 2019-10-29 NOTE — H&P ADULT - PROBLEM SELECTOR PLAN 1
CT shows enteritis - given cipro and flagyl in ED  Will hold off without antibiotics for now.  Enteritis likely secondary to EtOH abuse.

## 2019-10-29 NOTE — H&P ADULT - NSHPPHYSICALEXAM_GEN_ALL_CORE
Physical exam:  General: patient in no acute distress, resting comfortably  Cardio: S1/S2 +ve, regular rate and rhythm, no M/G/R  Resp: clear to ausculation bilaterally, no rales or wheezes  GI: abdomen soft, with mild tenderness in all areas, non distended, no guarding, BS +ve x 4  Ext: no significant pedal edema  Neuro: CN 2-12 intact, no significant motor or sensory deficits.

## 2019-10-29 NOTE — ED ADULT NURSE NOTE - PMH
DTs (delirium tremens)    EtOH dependence    Gastritis    Mixed hyperlipidemia    PUD (peptic ulcer disease)    Substance abuse

## 2019-10-29 NOTE — H&P ADULT - HISTORY OF PRESENT ILLNESS
Patient is a 54 M with a PMH of  ETOH w/d (s/p frequent hospital admissions), GERD, HLD, alcoholic gastritis/esophagitis, PUD who presents for two days of diarrhea and abdominal pain.  Patient given valium in ED prior 1 hr prior to evaluation - unable to provide full hx.  Patient reports pain 10/10, nonradiational associated with NBNB vomiting and nonbloody diarrhea.  Patient known to chronically drink vodka.  Patient reports that his last drink was yesterday and that he drank about 3 shots of vodka - which less than he normally does - secondary to abdominal pain.

## 2019-10-29 NOTE — ED PROVIDER NOTE - CLINICAL SUMMARY MEDICAL DECISION MAKING FREE TEXT BOX
Male presents to ED for evaluation of generalized abd pain, nausea, NBNB vomiting - Will check labs, imaging, medicate, adm

## 2019-10-29 NOTE — H&P ADULT - ASSESSMENT
Patient is a 54M 54M with hx of gastritis, alcohol abuse and withdrawal presents with gastroenteritis.  Will admit to britta VILLELAWA protocol as well as patient is high risk for EtOH withdrawal.  Hx of DT.    IMPROVE VTE Individual Risk Assessment          RISK                                                          Points  [  ] Previous VTE                                                3  [  ] Thrombophilia                                             2  [  ] Lower limb paralysis                                    2        (unable to hold up >15 seconds)    [  ] Current Cancer                                             2         (within 6 months)  [  ] Immobilization > 24 hrs                              1  [  ] ICU/CCU stay > 24 hours                            1  [  ] Age > 60                                                    1    IMPROVE VTE Score - 0

## 2019-10-29 NOTE — ED PROVIDER NOTE - PROGRESS NOTE DETAILS
handover by dr ernst - pt known to [facility], hx etoh. pw vomiting. ct shows enteritis. admit for uncontrolled vomiting and etoh withdrawal.

## 2019-10-29 NOTE — H&P ADULT - PROBLEM SELECTOR PLAN 2
CIWA currently 0, recently received valium  Will continue withdrawal protocol with Librium 50 q6 standing  Ativan 2 mg IVP PRN for acute withdrawal symptoms  Thiamine, Folate, MVI daily

## 2019-10-29 NOTE — ED PROVIDER NOTE - PHYSICAL EXAMINATION
Gen: no acute distress, well appearing, awake, alert and oriented x 3  Cardiac: regular rate and rhythm, +S1S2  Pulm: Clear to auscultation bilaterally  Abd: soft, + generalized ttp, nondistended, no guarding  Back: neg CVA ttp, nontender spine  Extremity: no edema, no deformity, warm and well perfused, FROM all extremities    Neuro: awake, alert, oriented x 3, sensorimotor intact

## 2019-10-30 PROBLEM — K29.70 GASTRITIS, UNSPECIFIED, WITHOUT BLEEDING: Chronic | Status: ACTIVE | Noted: 2019-10-19

## 2019-10-30 PROCEDURE — 99233 SBSQ HOSP IP/OBS HIGH 50: CPT

## 2019-10-30 RX ORDER — INFLUENZA VIRUS VACCINE 15; 15; 15; 15 UG/.5ML; UG/.5ML; UG/.5ML; UG/.5ML
0.5 SUSPENSION INTRAMUSCULAR ONCE
Refills: 0 | Status: DISCONTINUED | OUTPATIENT
Start: 2019-10-30 | End: 2019-10-31

## 2019-10-30 RX ADMIN — Medication 100 MILLIGRAM(S): at 11:12

## 2019-10-30 RX ADMIN — PANTOPRAZOLE SODIUM 40 MILLIGRAM(S): 20 TABLET, DELAYED RELEASE ORAL at 11:12

## 2019-10-30 RX ADMIN — Medication 50 MILLIGRAM(S): at 00:53

## 2019-10-30 RX ADMIN — MORPHINE SULFATE 2 MILLIGRAM(S): 50 CAPSULE, EXTENDED RELEASE ORAL at 03:32

## 2019-10-30 RX ADMIN — MORPHINE SULFATE 4 MILLIGRAM(S): 50 CAPSULE, EXTENDED RELEASE ORAL at 00:05

## 2019-10-30 RX ADMIN — Medication 50 MILLIGRAM(S): at 05:15

## 2019-10-30 RX ADMIN — Medication 40 MILLIEQUIVALENT(S): at 00:53

## 2019-10-30 RX ADMIN — MORPHINE SULFATE 2 MILLIGRAM(S): 50 CAPSULE, EXTENDED RELEASE ORAL at 10:58

## 2019-10-30 RX ADMIN — SIMVASTATIN 20 MILLIGRAM(S): 20 TABLET, FILM COATED ORAL at 21:31

## 2019-10-30 RX ADMIN — Medication 1 MILLIGRAM(S): at 11:12

## 2019-10-30 RX ADMIN — MORPHINE SULFATE 2 MILLIGRAM(S): 50 CAPSULE, EXTENDED RELEASE ORAL at 18:10

## 2019-10-30 RX ADMIN — SODIUM CHLORIDE 75 MILLILITER(S): 9 INJECTION INTRAMUSCULAR; INTRAVENOUS; SUBCUTANEOUS at 00:53

## 2019-10-30 RX ADMIN — Medication 50 MILLIGRAM(S): at 18:11

## 2019-10-30 RX ADMIN — Medication 25 MILLIGRAM(S): at 05:15

## 2019-10-30 RX ADMIN — Medication 2 MILLIGRAM(S): at 16:27

## 2019-10-30 RX ADMIN — Medication 1 TABLET(S): at 11:12

## 2019-10-30 RX ADMIN — Medication 2 MILLIGRAM(S): at 11:18

## 2019-10-30 RX ADMIN — Medication 50 MILLIGRAM(S): at 11:12

## 2019-10-30 RX ADMIN — MORPHINE SULFATE 2 MILLIGRAM(S): 50 CAPSULE, EXTENDED RELEASE ORAL at 03:17

## 2019-10-30 NOTE — PROGRESS NOTE ADULT - PROBLEM SELECTOR PLAN 1
CT shows enteritis and thickened distal esophagus ? gi for egd possible outpt egd     s/p cipro and flagyl in ED   Enteritis likely secondary to EtOH abuse.

## 2019-10-30 NOTE — PROGRESS NOTE ADULT - ASSESSMENT
54M with hx of gastritis, alcohol abuse and withdrawal presents with gastroenteritis.  Will admit to britta BROOKS protocol as well as patient is high risk for EtOH withdrawal.  Hx of DT.

## 2019-10-30 NOTE — PROGRESS NOTE ADULT - SUBJECTIVE AND OBJECTIVE BOX
Patient is a 54y old  Male who presents with a chief complaint of abdominal pain/ diarrhea (29 Oct 2019 22:31)      OVERNIGHT EVENTS:      MEDICATIONS  (STANDING):  chlordiazePOXIDE 50 milliGRAM(s) Oral every 6 hours  folic acid 1 milliGRAM(s) Oral daily  influenza   Vaccine 0.5 milliLiter(s) IntraMuscular once  metoprolol tartrate 25 milliGRAM(s) Oral two times a day  multivitamin 1 Tablet(s) Oral daily  pantoprazole  Injectable 40 milliGRAM(s) IV Push daily  simvastatin 20 milliGRAM(s) Oral at bedtime  sodium chloride 0.9%. 1000 milliLiter(s) (75 mL/Hr) IV Continuous <Continuous>  thiamine 100 milliGRAM(s) Oral daily    MEDICATIONS  (PRN):  LORazepam   Injectable 2 milliGRAM(s) IV Push every 1 hour PRN Alcohol Withdrawal  morphine  - Injectable 2 milliGRAM(s) IV Push every 6 hours PRN Severe Pain (7 - 10)  ondansetron Injectable 4 milliGRAM(s) IV Push every 8 hours PRN Nausea and/or Vomiting      Allergies    No Known Allergies    Intolerances        SUBJECTIVE: in bed in NAD, no acute events overnight     T(F): 98.6 (10-30-19 @ 04:57), Max: 99.6 (10-29-19 @ 23:32)  HR: 94 (10-30-19 @ 04:57) (84 - 134)  BP: 124/70 (10-30-19 @ 04:57) (116/62 - 124/86)  RR: 16 (10-30-19 @ 04:57) (14 - 17)  SpO2: 96% (10-30-19 @ 04:57) (95% - 100%)  Wt(kg): --    PHYSICAL EXAM:  GENERAL: NAD, well-groomed, well-developed  HEAD:  Atraumatic, Normocephalic  EYES: EOMI, PERRLA, conjunctiva and sclera clear  ENMT: No tonsillar erythema, exudates, or enlargement; Moist mucous membranes, Good dentition, No lesions  NECK: Supple, No JVD, Normal thyroid  CHEST/LUNG: Clear to  auscultation bilaterally; No rales, rhonchi, wheezing, or rubs  bilaterally  HEART: Regular rate and rhythm; No murmurs, rubs, or gallops  ABDOMEN: Soft, Nontender, Nondistended; Bowel sounds present  EXTREMITIES:  2+ Peripheral Pulses, No clubbing, cyanosis, or edema BL LE  LYMPH: No lymphadenopathy noted  SKIN: No rashes or lesions  NERVOUS SYSTEM:  Alert & Oriented X3, Good concentration; Motor Strength 5/5 B/L upper and lower extremities;   DTRs 2+ intact and symmetric, sensation intact BL    LABS:                        15.3   6.33  )-----------( 248      ( 29 Oct 2019 17:36 )             46.3     10-29    135  |  87<L>  |  11  ----------------------------<  127<H>  3.2<L>   |  31  |  1.24    Ca    10.5<H>      29 Oct 2019 17:36    TPro  9.5<H>  /  Alb  4.6  /  TBili  0.4  /  DBili  x   /  AST  58<H>  /  ALT  76  /  AlkPhos  60  10-29      Urinalysis Basic - ( 29 Oct 2019 19:43 )    Color: Yellow / Appearance: Clear / S.015 / pH: x  Gluc: x / Ketone: Trace  / Bili: Negative / Urobili: Negative mg/dL   Blood: x / Protein: 100 mg/dL / Nitrite: Negative   Leuk Esterase: Negative / RBC: 3-5 /HPF / WBC 3-5   Sq Epi: x / Non Sq Epi: Few / Bacteria: Few      Cultures;   CAPILLARY BLOOD GLUCOSE        Lipid panel:     CARDIAC MARKERS ( 29 Oct 2019 17:36 )  <.015 ng/mL / x     / x     / x     / x            RADIOLOGY & ADDITIONAL TESTS:  < from: CT Abdomen and Pelvis w/ IV Cont (10.29.19 @ 18:45) >  IMPRESSION:     Absence of enteric contrast limits evaluation of the GI tract.    Fluid-filled nondistended small bowel consistent with enteritis.    Thickened distal esophagus. Recommend endoscopy.    Hepatomegaly and hepatic steatosis.              < end of copied text >        Imaging Personally Reviewed:  [ x] YES      Consultant(s) Notes Reviewed:  [x ] YES     Care Discussed with [x ] Consultants [X ] Patient [x ] Family  [x ]    [x ]  Other; RN

## 2019-10-30 NOTE — PROGRESS NOTE ADULT - PROBLEM SELECTOR PLAN 2
CIWA at 5  continue with CIWA protocol   Ativan 2 mg IVP PRN for acute withdrawal symptoms  Thiamine, Folate, MVI daily

## 2019-10-31 ENCOUNTER — TRANSCRIPTION ENCOUNTER (OUTPATIENT)
Age: 54
End: 2019-10-31

## 2019-10-31 VITALS
DIASTOLIC BLOOD PRESSURE: 73 MMHG | HEART RATE: 64 BPM | WEIGHT: 166.01 LBS | TEMPERATURE: 98 F | OXYGEN SATURATION: 96 % | SYSTOLIC BLOOD PRESSURE: 120 MMHG | RESPIRATION RATE: 18 BRPM

## 2019-10-31 DIAGNOSIS — Z53.20 PROCEDURE AND TREATMENT NOT CARRIED OUT BECAUSE OF PATIENT'S DECISION FOR UNSPECIFIED REASONS: ICD-10-CM

## 2019-10-31 LAB
ANION GAP SERPL CALC-SCNC: 9 MMOL/L — SIGNIFICANT CHANGE UP (ref 5–17)
BUN SERPL-MCNC: 7 MG/DL — SIGNIFICANT CHANGE UP (ref 7–23)
CALCIUM SERPL-MCNC: 8.6 MG/DL — SIGNIFICANT CHANGE UP (ref 8.5–10.1)
CHLORIDE SERPL-SCNC: 106 MMOL/L — SIGNIFICANT CHANGE UP (ref 96–108)
CO2 SERPL-SCNC: 22 MMOL/L — SIGNIFICANT CHANGE UP (ref 22–31)
CREAT SERPL-MCNC: 0.7 MG/DL — SIGNIFICANT CHANGE UP (ref 0.5–1.3)
CULTURE RESULTS: SIGNIFICANT CHANGE UP
GLUCOSE SERPL-MCNC: 75 MG/DL — SIGNIFICANT CHANGE UP (ref 70–99)
HCT VFR BLD CALC: 35.8 % — LOW (ref 39–50)
HGB BLD-MCNC: 11.7 G/DL — LOW (ref 13–17)
MAGNESIUM SERPL-MCNC: 1.7 MG/DL — SIGNIFICANT CHANGE UP (ref 1.6–2.6)
MCHC RBC-ENTMCNC: 29 PG — SIGNIFICANT CHANGE UP (ref 27–34)
MCHC RBC-ENTMCNC: 32.7 GM/DL — SIGNIFICANT CHANGE UP (ref 32–36)
MCV RBC AUTO: 88.6 FL — SIGNIFICANT CHANGE UP (ref 80–100)
NRBC # BLD: 0 /100 WBCS — SIGNIFICANT CHANGE UP (ref 0–0)
PHOSPHATE SERPL-MCNC: 2.7 MG/DL — SIGNIFICANT CHANGE UP (ref 2.5–4.5)
PLATELET # BLD AUTO: 153 K/UL — SIGNIFICANT CHANGE UP (ref 150–400)
POTASSIUM SERPL-MCNC: 3.8 MMOL/L — SIGNIFICANT CHANGE UP (ref 3.5–5.3)
POTASSIUM SERPL-SCNC: 3.8 MMOL/L — SIGNIFICANT CHANGE UP (ref 3.5–5.3)
RBC # BLD: 4.04 M/UL — LOW (ref 4.2–5.8)
RBC # FLD: 13 % — SIGNIFICANT CHANGE UP (ref 10.3–14.5)
SODIUM SERPL-SCNC: 137 MMOL/L — SIGNIFICANT CHANGE UP (ref 135–145)
SPECIMEN SOURCE: SIGNIFICANT CHANGE UP
WBC # BLD: 2.72 K/UL — LOW (ref 3.8–10.5)
WBC # FLD AUTO: 2.72 K/UL — LOW (ref 3.8–10.5)

## 2019-10-31 PROCEDURE — 99238 HOSP IP/OBS DSCHRG MGMT 30/<: CPT

## 2019-10-31 RX ORDER — THIAMINE MONONITRATE (VIT B1) 100 MG
1 TABLET ORAL
Qty: 30 | Refills: 0
Start: 2019-10-31 | End: 2019-11-29

## 2019-10-31 RX ORDER — ENOXAPARIN SODIUM 100 MG/ML
40 INJECTION SUBCUTANEOUS DAILY
Refills: 0 | Status: DISCONTINUED | OUTPATIENT
Start: 2019-10-31 | End: 2019-10-31

## 2019-10-31 RX ORDER — METRONIDAZOLE 500 MG
500 TABLET ORAL EVERY 8 HOURS
Refills: 0 | Status: DISCONTINUED | OUTPATIENT
Start: 2019-10-31 | End: 2019-10-31

## 2019-10-31 RX ADMIN — MORPHINE SULFATE 2 MILLIGRAM(S): 50 CAPSULE, EXTENDED RELEASE ORAL at 07:53

## 2019-10-31 RX ADMIN — MORPHINE SULFATE 2 MILLIGRAM(S): 50 CAPSULE, EXTENDED RELEASE ORAL at 00:41

## 2019-10-31 RX ADMIN — Medication 50 MILLIGRAM(S): at 06:17

## 2019-10-31 RX ADMIN — MORPHINE SULFATE 2 MILLIGRAM(S): 50 CAPSULE, EXTENDED RELEASE ORAL at 00:26

## 2019-10-31 RX ADMIN — SODIUM CHLORIDE 75 MILLILITER(S): 9 INJECTION INTRAMUSCULAR; INTRAVENOUS; SUBCUTANEOUS at 00:32

## 2019-10-31 RX ADMIN — Medication 50 MILLIGRAM(S): at 00:26

## 2019-10-31 RX ADMIN — MORPHINE SULFATE 2 MILLIGRAM(S): 50 CAPSULE, EXTENDED RELEASE ORAL at 08:11

## 2019-10-31 NOTE — PROGRESS NOTE ADULT - SUBJECTIVE AND OBJECTIVE BOX
Patient is a 54y old  Male who presents with a chief complaint of abdominal pain/ diarrhea (29 Oct 2019 22:31)      OVERNIGHT EVENTS:    MEDICATIONS  (STANDING):  chlordiazePOXIDE 50 milliGRAM(s) Oral every 6 hours  folic acid 1 milliGRAM(s) Oral daily  influenza   Vaccine 0.5 milliLiter(s) IntraMuscular once  metoprolol tartrate 25 milliGRAM(s) Oral two times a day  multivitamin 1 Tablet(s) Oral daily  pantoprazole  Injectable 40 milliGRAM(s) IV Push daily  simvastatin 20 milliGRAM(s) Oral at bedtime  sodium chloride 0.9%. 1000 milliLiter(s) (75 mL/Hr) IV Continuous <Continuous>  thiamine 100 milliGRAM(s) Oral daily    MEDICATIONS  (PRN):  LORazepam   Injectable 2 milliGRAM(s) IV Push every 1 hour PRN Alcohol Withdrawal  morphine  - Injectable 2 milliGRAM(s) IV Push every 6 hours PRN Severe Pain (7 - 10)  ondansetron Injectable 4 milliGRAM(s) IV Push every 8 hours PRN Nausea and/or Vomiting      Allergies    No Known Allergies    Intolerances        SUBJECTIVE: in bed in NAD, no acute events overnight     Vital Signs Last 24 Hrs  T(C): 36.4 (31 Oct 2019 05:04), Max: 37 (30 Oct 2019 16:50)  T(F): 97.5 (31 Oct 2019 05:04), Max: 98.6 (30 Oct 2019 16:50)  HR: 64 (31 Oct 2019 05:04) (64 - 74)  BP: 120/73 (31 Oct 2019 05:04) (118/74 - 139/78)  BP(mean): --  RR: 18 (31 Oct 2019 05:04) (16 - 18)  SpO2: 96% (31 Oct 2019 05:04) (96% - 98%)    PHYSICAL EXAM:  GENERAL: NAD, well-groomed, well-developed  HEAD:  Atraumatic, Normocephalic  EYES: EOMI, PERRLA, conjunctiva and sclera clear  ENMT: No tonsillar erythema, exudates, or enlargement; Moist mucous membranes, Good dentition, No lesions  NECK: Supple, No JVD, Normal thyroid  CHEST/LUNG: Clear to  auscultation bilaterally; No rales, rhonchi, wheezing, or rubs  bilaterally  HEART: Regular rate and rhythm; No murmurs, rubs, or gallops  ABDOMEN: Soft, Nontender, Nondistended; Bowel sounds present  EXTREMITIES:  2+ Peripheral Pulses, No clubbing, cyanosis, or edema BL LE  SKIN: No rashes or lesions  NERVOUS SYSTEM:  Alert & Oriented X3, Good concentration; Motor Strength 5/5 B/L upper and lower extremities;   DTRs 2+ intact and symmetric, sensation intact BL    LABS:                                         15.3   6.33  )-----------( 248      ( 29 Oct 2019 17:36 )             46.3   10-31    137  |  106  |  7   ----------------------------<  75  3.8   |  22  |  0.70    Ca    8.6      31 Oct 2019 08:07  Phos  2.7     10-31  Mg     1.7     10-31    TPro  9.5<H>  /  Alb  4.6  /  TBili  0.4  /  DBili  x   /  AST  58<H>  /  ALT  76  /  AlkPhos  60  10-29        Cultures;   CAPILLARY BLOOD GLUCOSE        Lipid panel:     CARDIAC MARKERS ( 29 Oct 2019 17:36 )  <.015 ng/mL / x     / x     / x     / x            RADIOLOGY & ADDITIONAL TESTS:  < from: CT Abdomen and Pelvis w/ IV Cont (10.29.19 @ 18:45) >  IMPRESSION:     Absence of enteric contrast limits evaluation of the GI tract.    Fluid-filled nondistended small bowel consistent with enteritis.    Thickened distal esophagus. Recommend endoscopy.    Hepatomegaly and hepatic steatosis.              < end of copied text >        Imaging Personally Reviewed:  [ x] YES      Consultant(s) Notes Reviewed:  [x ] YES     Care Discussed with [x ] Consultants [X ] Patient [x ] Family  [x ]    [x ]  Other; RN

## 2019-10-31 NOTE — PROGRESS NOTE ADULT - PROBLEM SELECTOR PLAN 2
CIWA at 5  continue with CIWA protocol   Ativan 2 mg IVP PRN for acute withdrawal symptoms  Thiamine, Folate, MVI daily CIWA at 4  continue with CIWA protocol   Ativan 2 mg IVP PRN for acute withdrawal symptoms  Thiamine, Folate, MVI daily

## 2019-10-31 NOTE — DISCHARGE NOTE PROVIDER - NSDCMRMEDTOKEN_GEN_ALL_CORE_FT
folic acid 1 mg oral tablet: 1 tab(s) orally once a day  Lopressor 50 mg oral tablet: 0.5 tab(s) orally once a day   Multiple Vitamins oral tablet: 1 tab(s) orally once a day  pantoprazole 40 mg oral delayed release tablet: 1 tab(s) orally once a day   SEROquel 50 mg oral tablet: 1 tab(s) orally once a day  simvastatin 20 mg oral tablet: 1 tab(s) orally once a day (at bedtime)  sucralfate 1 g/10 mL oral suspension: 10 milliliter(s) orally 4 times a day  thiamine 100 mg oral tablet: 1 tab(s) orally once a day

## 2019-10-31 NOTE — DISCHARGE NOTE PROVIDER - HOSPITAL COURSE
54M with hx of gastritis, alcohol abuse and withdrawal presents with gastroenteritis.  Will admit to britta isidro CIWA protocol as well as patient is high risk for EtOH withdrawal.  Hx of DT.             Problem/Plan - 1:    ·  Problem: Enteritis.  Plan: CT shows enteritis and thickened distal esophagus will  consult GI          s/p cipro and flagyl in ED, continue with flagyl     Enteritis likely secondary to EtOH abuse.          Problem/Plan - 2:    ·  Problem: Alcohol withdrawal syndrome without complication.  Plan: CIWA at 4    continue with CIWA protocol     Ativan 2 mg IVP PRN for acute withdrawal symptoms    Thiamine, Folate, MVI daily.          Problem/Plan - 3:    ·  Problem: PUD (peptic ulcer disease).  Plan: PPI IVP daily.          Problem/Plan - 4:    ·  Problem: Dyslipidemia.  Plan: Simvastatin    LFTS wnl.          Problem/Plan - 5:    ·  Problem: Essential hypertension.  Plan: metoprolol with holding parameters.          Problem/Plan - 6:    Problem: Hypokalemia. Plan: was replaced.         Problem/Plan - 8:    ·  Problem: Left against medical advice.  son notified and will  the patient

## 2019-10-31 NOTE — DISCHARGE NOTE PROVIDER - NSDCCPCAREPLAN_GEN_ALL_CORE_FT
PRINCIPAL DISCHARGE DIAGNOSIS  Diagnosis: Enteritis  Assessment and Plan of Treatment:       SECONDARY DISCHARGE DIAGNOSES  Diagnosis: Essential hypertension  Assessment and Plan of Treatment: Essential hypertension    Diagnosis: Alcohol withdrawal syndrome without complication  Assessment and Plan of Treatment: Alcohol withdrawal syndrome without complication    Diagnosis: Dyslipidemia  Assessment and Plan of Treatment: Dyslipidemia    Diagnosis: PUD (peptic ulcer disease)  Assessment and Plan of Treatment: PUD (peptic ulcer disease)    Diagnosis: Hypokalemia  Assessment and Plan of Treatment: Hypokalemia    Diagnosis: Left against medical advice  Assessment and Plan of Treatment: Left against medical advice

## 2019-10-31 NOTE — PROGRESS NOTE ADULT - PROBLEM SELECTOR PLAN 1
CT shows enteritis and thickened distal esophagus   consult GI      s/p cipro and flagyl in ED continue with flagyl   Enteritis likely secondary to EtOH abuse. CT shows enteritis and thickened distal esophagus will  consult GI      s/p cipro and flagyl in ED, continue with flagyl   Enteritis likely secondary to EtOH abuse.

## 2019-11-04 DIAGNOSIS — K52.9 NONINFECTIVE GASTROENTERITIS AND COLITIS, UNSPECIFIED: ICD-10-CM

## 2019-11-04 DIAGNOSIS — I10 ESSENTIAL (PRIMARY) HYPERTENSION: ICD-10-CM

## 2019-11-04 DIAGNOSIS — E87.6 HYPOKALEMIA: ICD-10-CM

## 2019-11-04 DIAGNOSIS — K27.9 PEPTIC ULCER, SITE UNSPECIFIED, UNSPECIFIED AS ACUTE OR CHRONIC, WITHOUT HEMORRHAGE OR PERFORATION: ICD-10-CM

## 2019-11-04 DIAGNOSIS — F10.230 ALCOHOL DEPENDENCE WITH WITHDRAWAL, UNCOMPLICATED: ICD-10-CM

## 2019-11-04 DIAGNOSIS — E78.2 MIXED HYPERLIPIDEMIA: ICD-10-CM

## 2019-11-07 ENCOUNTER — INPATIENT (INPATIENT)
Facility: HOSPITAL | Age: 54
LOS: 2 days | Discharge: HOME | End: 2019-11-10
Attending: INTERNAL MEDICINE | Admitting: INTERNAL MEDICINE
Payer: MEDICAID

## 2019-11-07 VITALS
WEIGHT: 156.97 LBS | RESPIRATION RATE: 18 BRPM | TEMPERATURE: 98 F | HEART RATE: 120 BPM | OXYGEN SATURATION: 100 % | HEIGHT: 67 IN | SYSTOLIC BLOOD PRESSURE: 135 MMHG | DIASTOLIC BLOOD PRESSURE: 93 MMHG

## 2019-11-07 DIAGNOSIS — K29.20 ALCOHOLIC GASTRITIS WITHOUT BLEEDING: ICD-10-CM

## 2019-11-07 DIAGNOSIS — F10.230 ALCOHOL DEPENDENCE WITH WITHDRAWAL, UNCOMPLICATED: ICD-10-CM

## 2019-11-07 LAB
ALBUMIN SERPL ELPH-MCNC: 3.3 G/DL — SIGNIFICANT CHANGE UP (ref 3.3–5)
ALBUMIN SERPL ELPH-MCNC: 3.9 G/DL — SIGNIFICANT CHANGE UP (ref 3.3–5)
ALP SERPL-CCNC: 42 U/L — SIGNIFICANT CHANGE UP (ref 40–120)
ALP SERPL-CCNC: 48 U/L — SIGNIFICANT CHANGE UP (ref 40–120)
ALT FLD-CCNC: 57 U/L — SIGNIFICANT CHANGE UP (ref 12–78)
ALT FLD-CCNC: 72 U/L — SIGNIFICANT CHANGE UP (ref 12–78)
ANION GAP SERPL CALC-SCNC: 14 MMOL/L — SIGNIFICANT CHANGE UP (ref 5–17)
ANION GAP SERPL CALC-SCNC: 9 MMOL/L — SIGNIFICANT CHANGE UP (ref 5–17)
AST SERPL-CCNC: 67 U/L — HIGH (ref 15–37)
AST SERPL-CCNC: 91 U/L — HIGH (ref 15–37)
BASOPHILS # BLD AUTO: 0.05 K/UL — SIGNIFICANT CHANGE UP (ref 0–0.2)
BASOPHILS NFR BLD AUTO: 2 % — SIGNIFICANT CHANGE UP (ref 0–2)
BILIRUB DIRECT SERPL-MCNC: 0.07 MG/DL — SIGNIFICANT CHANGE UP (ref 0.05–0.2)
BILIRUB INDIRECT FLD-MCNC: 0.1 MG/DL — LOW (ref 0.2–1)
BILIRUB SERPL-MCNC: 0.2 MG/DL — SIGNIFICANT CHANGE UP (ref 0.2–1.2)
BILIRUB SERPL-MCNC: 0.3 MG/DL — SIGNIFICANT CHANGE UP (ref 0.2–1.2)
BUN SERPL-MCNC: 8 MG/DL — SIGNIFICANT CHANGE UP (ref 7–23)
BUN SERPL-MCNC: 9 MG/DL — SIGNIFICANT CHANGE UP (ref 7–23)
CALCIUM SERPL-MCNC: 8 MG/DL — LOW (ref 8.5–10.1)
CALCIUM SERPL-MCNC: 8.6 MG/DL — SIGNIFICANT CHANGE UP (ref 8.5–10.1)
CHLORIDE SERPL-SCNC: 107 MMOL/L — SIGNIFICANT CHANGE UP (ref 96–108)
CHLORIDE SERPL-SCNC: 108 MMOL/L — SIGNIFICANT CHANGE UP (ref 96–108)
CHOLEST SERPL-MCNC: 243 MG/DL — HIGH (ref 10–199)
CO2 SERPL-SCNC: 24 MMOL/L — SIGNIFICANT CHANGE UP (ref 22–31)
CO2 SERPL-SCNC: 24 MMOL/L — SIGNIFICANT CHANGE UP (ref 22–31)
CREAT SERPL-MCNC: 0.71 MG/DL — SIGNIFICANT CHANGE UP (ref 0.5–1.3)
CREAT SERPL-MCNC: 0.8 MG/DL — SIGNIFICANT CHANGE UP (ref 0.5–1.3)
EOSINOPHIL # BLD AUTO: 0.07 K/UL — SIGNIFICANT CHANGE UP (ref 0–0.5)
EOSINOPHIL NFR BLD AUTO: 2.8 % — SIGNIFICANT CHANGE UP (ref 0–6)
ETHANOL SERPL-MCNC: 290 MG/DL — HIGH (ref 0–10)
GLUCOSE SERPL-MCNC: 141 MG/DL — HIGH (ref 70–99)
GLUCOSE SERPL-MCNC: 80 MG/DL — SIGNIFICANT CHANGE UP (ref 70–99)
HCT VFR BLD CALC: 43.1 % — SIGNIFICANT CHANGE UP (ref 39–50)
HDLC SERPL-MCNC: 125 MG/DL — SIGNIFICANT CHANGE UP
HGB BLD-MCNC: 14 G/DL — SIGNIFICANT CHANGE UP (ref 13–17)
IMM GRANULOCYTES NFR BLD AUTO: 0 % — SIGNIFICANT CHANGE UP (ref 0–1.5)
LIDOCAIN IGE QN: 453 U/L — HIGH (ref 73–393)
LIPID PNL WITH DIRECT LDL SERPL: 96 MG/DL — SIGNIFICANT CHANGE UP
LYMPHOCYTES # BLD AUTO: 1.42 K/UL — SIGNIFICANT CHANGE UP (ref 1–3.3)
LYMPHOCYTES # BLD AUTO: 57.3 % — HIGH (ref 13–44)
MAGNESIUM SERPL-MCNC: 1.9 MG/DL — SIGNIFICANT CHANGE UP (ref 1.6–2.6)
MAGNESIUM SERPL-MCNC: 2 MG/DL — SIGNIFICANT CHANGE UP (ref 1.6–2.6)
MANUAL SMEAR VERIFICATION: YES — SIGNIFICANT CHANGE UP
MCHC RBC-ENTMCNC: 28.5 PG — SIGNIFICANT CHANGE UP (ref 27–34)
MCHC RBC-ENTMCNC: 32.5 GM/DL — SIGNIFICANT CHANGE UP (ref 32–36)
MCV RBC AUTO: 87.8 FL — SIGNIFICANT CHANGE UP (ref 80–100)
MONOCYTES # BLD AUTO: 0.24 K/UL — SIGNIFICANT CHANGE UP (ref 0–0.9)
MONOCYTES NFR BLD AUTO: 9.7 % — SIGNIFICANT CHANGE UP (ref 2–14)
NEUTROPHILS # BLD AUTO: 0.7 K/UL — LOW (ref 1.8–7.4)
NEUTROPHILS NFR BLD AUTO: 28.2 % — LOW (ref 43–77)
NRBC # BLD: 0 /100 WBCS — SIGNIFICANT CHANGE UP (ref 0–0)
PHOSPHATE SERPL-MCNC: 1.9 MG/DL — LOW (ref 2.5–4.5)
PHOSPHATE SERPL-MCNC: 2.7 MG/DL — SIGNIFICANT CHANGE UP (ref 2.5–4.5)
PLAT MORPH BLD: NORMAL — SIGNIFICANT CHANGE UP
PLATELET # BLD AUTO: 208 K/UL — SIGNIFICANT CHANGE UP (ref 150–400)
POTASSIUM SERPL-MCNC: 3.9 MMOL/L — SIGNIFICANT CHANGE UP (ref 3.5–5.3)
POTASSIUM SERPL-MCNC: 4.2 MMOL/L — SIGNIFICANT CHANGE UP (ref 3.5–5.3)
POTASSIUM SERPL-SCNC: 3.9 MMOL/L — SIGNIFICANT CHANGE UP (ref 3.5–5.3)
POTASSIUM SERPL-SCNC: 4.2 MMOL/L — SIGNIFICANT CHANGE UP (ref 3.5–5.3)
PROT SERPL-MCNC: 6.7 GM/DL — SIGNIFICANT CHANGE UP (ref 6–8.3)
PROT SERPL-MCNC: 7.9 GM/DL — SIGNIFICANT CHANGE UP (ref 6–8.3)
RBC # BLD: 4.91 M/UL — SIGNIFICANT CHANGE UP (ref 4.2–5.8)
RBC # FLD: 14.7 % — HIGH (ref 10.3–14.5)
RBC BLD AUTO: NORMAL — SIGNIFICANT CHANGE UP
SODIUM SERPL-SCNC: 141 MMOL/L — SIGNIFICANT CHANGE UP (ref 135–145)
SODIUM SERPL-SCNC: 145 MMOL/L — SIGNIFICANT CHANGE UP (ref 135–145)
TOTAL CHOLESTEROL/HDL RATIO MEASUREMENT: 1.9 RATIO — LOW (ref 3.4–9.6)
TRIGL SERPL-MCNC: 109 MG/DL — SIGNIFICANT CHANGE UP (ref 10–149)
WBC # BLD: 2.48 K/UL — LOW (ref 3.8–10.5)
WBC # FLD AUTO: 2.48 K/UL — LOW (ref 3.8–10.5)

## 2019-11-07 PROCEDURE — 99223 1ST HOSP IP/OBS HIGH 75: CPT

## 2019-11-07 PROCEDURE — 93010 ELECTROCARDIOGRAM REPORT: CPT | Mod: 76

## 2019-11-07 PROCEDURE — 71045 X-RAY EXAM CHEST 1 VIEW: CPT | Mod: 26

## 2019-11-07 PROCEDURE — 99285 EMERGENCY DEPT VISIT HI MDM: CPT

## 2019-11-07 RX ORDER — DIAZEPAM 5 MG
10 TABLET ORAL ONCE
Refills: 0 | Status: DISCONTINUED | OUTPATIENT
Start: 2019-11-07 | End: 2019-11-07

## 2019-11-07 RX ORDER — SUCRALFATE 1 G
1 TABLET ORAL ONCE
Refills: 0 | Status: COMPLETED | OUTPATIENT
Start: 2019-11-07 | End: 2019-11-07

## 2019-11-07 RX ORDER — THIAMINE MONONITRATE (VIT B1) 100 MG
100 TABLET ORAL DAILY
Refills: 0 | Status: DISCONTINUED | OUTPATIENT
Start: 2019-11-07 | End: 2019-11-10

## 2019-11-07 RX ORDER — INFLUENZA VIRUS VACCINE 15; 15; 15; 15 UG/.5ML; UG/.5ML; UG/.5ML; UG/.5ML
0.5 SUSPENSION INTRAMUSCULAR ONCE
Refills: 0 | Status: COMPLETED | OUTPATIENT
Start: 2019-11-07 | End: 2019-11-10

## 2019-11-07 RX ORDER — ONDANSETRON 8 MG/1
8 TABLET, FILM COATED ORAL ONCE
Refills: 0 | Status: COMPLETED | OUTPATIENT
Start: 2019-11-07 | End: 2019-11-07

## 2019-11-07 RX ORDER — PANTOPRAZOLE SODIUM 20 MG/1
40 TABLET, DELAYED RELEASE ORAL EVERY 12 HOURS
Refills: 0 | Status: DISCONTINUED | OUTPATIENT
Start: 2019-11-07 | End: 2019-11-10

## 2019-11-07 RX ORDER — FOLIC ACID 0.8 MG
1 TABLET ORAL DAILY
Refills: 0 | Status: DISCONTINUED | OUTPATIENT
Start: 2019-11-07 | End: 2019-11-10

## 2019-11-07 RX ORDER — MORPHINE SULFATE 50 MG/1
4 CAPSULE, EXTENDED RELEASE ORAL ONCE
Refills: 0 | Status: DISCONTINUED | OUTPATIENT
Start: 2019-11-07 | End: 2019-11-07

## 2019-11-07 RX ORDER — SODIUM CHLORIDE 9 MG/ML
1000 INJECTION, SOLUTION INTRAVENOUS
Refills: 0 | Status: COMPLETED | OUTPATIENT
Start: 2019-11-07 | End: 2019-11-07

## 2019-11-07 RX ORDER — SODIUM CHLORIDE 9 MG/ML
1000 INJECTION, SOLUTION INTRAVENOUS
Refills: 0 | Status: DISCONTINUED | OUTPATIENT
Start: 2019-11-07 | End: 2019-11-09

## 2019-11-07 RX ORDER — PANTOPRAZOLE SODIUM 20 MG/1
40 TABLET, DELAYED RELEASE ORAL ONCE
Refills: 0 | Status: COMPLETED | OUTPATIENT
Start: 2019-11-07 | End: 2019-11-07

## 2019-11-07 RX ORDER — ONDANSETRON 8 MG/1
4 TABLET, FILM COATED ORAL EVERY 6 HOURS
Refills: 0 | Status: DISCONTINUED | OUTPATIENT
Start: 2019-11-07 | End: 2019-11-10

## 2019-11-07 RX ORDER — HEPARIN SODIUM 5000 [USP'U]/ML
5000 INJECTION INTRAVENOUS; SUBCUTANEOUS EVERY 12 HOURS
Refills: 0 | Status: DISCONTINUED | OUTPATIENT
Start: 2019-11-07 | End: 2019-11-10

## 2019-11-07 RX ORDER — POTASSIUM PHOSPHATE, MONOBASIC POTASSIUM PHOSPHATE, DIBASIC 236; 224 MG/ML; MG/ML
15 INJECTION, SOLUTION INTRAVENOUS ONCE
Refills: 0 | Status: COMPLETED | OUTPATIENT
Start: 2019-11-07 | End: 2019-11-07

## 2019-11-07 RX ADMIN — PANTOPRAZOLE SODIUM 40 MILLIGRAM(S): 20 TABLET, DELAYED RELEASE ORAL at 05:59

## 2019-11-07 RX ADMIN — Medication 2 MILLIGRAM(S): at 03:58

## 2019-11-07 RX ADMIN — Medication 2 MILLIGRAM(S): at 17:36

## 2019-11-07 RX ADMIN — SODIUM CHLORIDE 125 MILLILITER(S): 9 INJECTION, SOLUTION INTRAVENOUS at 04:49

## 2019-11-07 RX ADMIN — ONDANSETRON 4 MILLIGRAM(S): 8 TABLET, FILM COATED ORAL at 22:27

## 2019-11-07 RX ADMIN — HEPARIN SODIUM 5000 UNIT(S): 5000 INJECTION INTRAVENOUS; SUBCUTANEOUS at 05:59

## 2019-11-07 RX ADMIN — MORPHINE SULFATE 4 MILLIGRAM(S): 50 CAPSULE, EXTENDED RELEASE ORAL at 02:38

## 2019-11-07 RX ADMIN — Medication 2 MILLIGRAM(S): at 06:51

## 2019-11-07 RX ADMIN — Medication 100 MILLIGRAM(S): at 11:29

## 2019-11-07 RX ADMIN — Medication 2 MILLIGRAM(S): at 10:27

## 2019-11-07 RX ADMIN — Medication 2 MILLIGRAM(S): at 20:10

## 2019-11-07 RX ADMIN — SODIUM CHLORIDE 125 MILLILITER(S): 9 INJECTION, SOLUTION INTRAVENOUS at 07:40

## 2019-11-07 RX ADMIN — Medication 1 GRAM(S): at 22:09

## 2019-11-07 RX ADMIN — SODIUM CHLORIDE 125 MILLILITER(S): 9 INJECTION, SOLUTION INTRAVENOUS at 22:17

## 2019-11-07 RX ADMIN — POTASSIUM PHOSPHATE, MONOBASIC POTASSIUM PHOSPHATE, DIBASIC 62.5 MILLIMOLE(S): 236; 224 INJECTION, SOLUTION INTRAVENOUS at 12:51

## 2019-11-07 RX ADMIN — HEPARIN SODIUM 5000 UNIT(S): 5000 INJECTION INTRAVENOUS; SUBCUTANEOUS at 17:35

## 2019-11-07 RX ADMIN — ONDANSETRON 8 MILLIGRAM(S): 8 TABLET, FILM COATED ORAL at 02:37

## 2019-11-07 RX ADMIN — MORPHINE SULFATE 4 MILLIGRAM(S): 50 CAPSULE, EXTENDED RELEASE ORAL at 02:55

## 2019-11-07 RX ADMIN — Medication 1 MILLIGRAM(S): at 11:30

## 2019-11-07 RX ADMIN — PANTOPRAZOLE SODIUM 40 MILLIGRAM(S): 20 TABLET, DELAYED RELEASE ORAL at 17:35

## 2019-11-07 RX ADMIN — Medication 1 TABLET(S): at 11:30

## 2019-11-07 RX ADMIN — PANTOPRAZOLE SODIUM 40 MILLIGRAM(S): 20 TABLET, DELAYED RELEASE ORAL at 02:37

## 2019-11-07 RX ADMIN — Medication 10 MILLIGRAM(S): at 03:15

## 2019-11-07 RX ADMIN — Medication 2 MILLIGRAM(S): at 15:36

## 2019-11-07 RX ADMIN — SODIUM CHLORIDE 500 MILLILITER(S): 9 INJECTION, SOLUTION INTRAVENOUS at 02:35

## 2019-11-07 NOTE — H&P ADULT - ASSESSMENT
55 y/o male with PMH EtOH abuse, gastritis, HLD presents to ED for evaluation of abd pain with NB vomiting 1 days. Patient reports that pain is constant, in upper abdomen, aching, non radiating, has nausea with NBNB vomiting. His last drink of vodka was pta. Denies fevers, chills, chest pain, shortness of breath, dysuria, rhinorrhea, rash, bleeding, numbness, ha, vision loss; he didn't take anything for symptoms, in particular denies NSAIDS. Pt presents with alcohol withdrawal and abdominal pain, probably from gastritis.  IMPROVE VTE Individual Risk Assessment          RISK                                                          Points    [  ] Previous VTE                                                3    [  ] Thrombophilia                                             2    [  ] Lower limb paralysis                                    2        (unable to hold up >15 seconds)      [  ] Current Cancer                                             2         (within 6 months)    [  ] Immobilization > 24 hrs                              1    [  ] ICU/CCU stay > 24 hours                            1    [  ] Age > 60                                                    1    IMPROVE VTE Score ____0_____

## 2019-11-07 NOTE — ED PROVIDER NOTE - CLINICAL SUMMARY MEDICAL DECISION MAKING FREE TEXT BOX
nausea and vomiting and pain. likely alcoholic gastritis. nausea and vomiting and pain. likely alcoholic gastritis. will admit for symptom control.

## 2019-11-07 NOTE — H&P ADULT - HISTORY OF PRESENT ILLNESS
55 y/o male with PMH EtOH abuse, gastritis, HLD presents to ED for evaluation of abd pain with NB vomiting 1 days. Patient reports that pain is constant, in upper abdomen, aching, non radiating, has nausea with NBNB vomiting. His last drink of vodka was pta. Denies fevers, chills, chest pain, shortness of breath, dysuria, rhinorrhea, rash, bleeding, numbness, ha, vision loss;  he didn't take anything for symptoms, in particular denies NSAIDS.

## 2019-11-07 NOTE — H&P ADULT - NSHPPHYSICALEXAM_GEN_ALL_CORE
T(C): 36.8 (07 Nov 2019 00:33), Max: 36.8 (07 Nov 2019 00:33)  T(F): 98.3 (07 Nov 2019 00:33), Max: 98.3 (07 Nov 2019 00:33)  HR: 94 (07 Nov 2019 03:00) (92 - 120)  BP: 125/78 (07 Nov 2019 03:00) (118/67 - 139/90)  BP(mean): --  RR: 12 (07 Nov 2019 03:00) (12 - 21)  SpO2: 96% (07 Nov 2019 03:00) (94% - 100%)    PHYSICAL EXAM:  GENERAL: NAD, well-groomed, well-developed, alcohol on breath  HEAD:  Atraumatic, Normocephalic  EYES: EOMI, PERRLA, conjunctiva and sclera clear  ENMT: No tonsillar erythema, exudates, or enlargement; Moist mucous membranes, No lesions  NECK: Supple, No JVD, Normal thyroid  NERVOUS SYSTEM:  Alert & Oriented X3, CN 2-12 intact, no focal deficits, + tremor  CHEST/LUNG: Clear to percussion bilaterally; No rales, rhonchi, wheezing, or rubs  HEART: Regular rate and rhythm; No murmurs, rubs, or gallops  ABDOMEN: Soft, epigastric tenderness, Nondistended; Bowel sounds present  EXTREMITIES:  + Peripheral Pulses, No clubbing, cyanosis, or edema  LYMPH: No lymphadenopathy noted  SKIN: No rashes or lesions

## 2019-11-07 NOTE — ED ADULT NURSE NOTE - NSIMPLEMENTINTERV_GEN_ALL_ED
Implemented All Universal Safety Interventions:  [place] to call system. Call bell, personal items and telephone within reach. Instruct patient to call for assistance. Room bathroom lighting operational. Non-slip footwear when patient is off stretcher. Physically safe environment: no spills, clutter or unnecessary equipment. Stretcher in lowest position, wheels locked, appropriate side rails in place.

## 2019-11-07 NOTE — H&P ADULT - PROBLEM SELECTOR PLAN 1
monitor on telemetry, IV fluids, CIWA protocol, benzodiazepines, monitor and replace vitamins and electrolytes

## 2019-11-07 NOTE — ED ADULT TRIAGE NOTE - CHIEF COMPLAINT QUOTE
pt co abdominal pain. + n/v. denies diarrhea. hx. ulcers  pt normally drinks 750 mL of vodka daily, has not drunk anything today. + shaking

## 2019-11-07 NOTE — H&P ADULT - NSHPREVIEWOFSYSTEMS_GEN_ALL_CORE
REVIEW OF SYSTEMS:  CONSTITUTIONAL: No fever, weight loss, or fatigue  EYES: No eye pain, visual disturbances, or discharge  ENMT:  No difficulty hearing, tinnitus, vertigo; No sinus or throat pain  NECK: No pain or stiffness  RESPIRATORY: No cough, wheezing, chills or hemoptysis; No shortness of breath  CARDIOVASCULAR: No chest pain, palpitations, dizziness, or leg swelling  GASTROINTESTINAL: +abdominal  pain. + nausea, vomiting, no hematemesis; No diarrhea or constipation. No melena or hematochezia.  GENITOURINARY: No dysuria, frequency, hematuria, or incontinence  NEUROLOGICAL: No headaches, memory loss, loss of strength, numbness, or tremors  SKIN: No itching, burning, rashes, or lesions   LYMPH NODES: No enlarged glands  ENDOCRINE: No heat or cold intolerance; No hair loss  MUSCULOSKELETAL: No joint pain or swelling; No muscle, back, or extremity pain  PSYCHIATRIC: No depression, anxiety, mood swings, or difficulty sleeping  HEME/LYMPH: No easy bruising, or bleeding gums  ALLERGY AND IMMUNOLOGIC: No hives or eczema

## 2019-11-07 NOTE — H&P ADULT - NSHPLABSRESULTS_GEN_ALL_CORE
LABS:                        14.0   2.48  )-----------( 208      ( 07 Nov 2019 02:30 )             43.1     11-07    145  |  107  |  8   ----------------------------<  80  4.2   |  24  |  0.80    Ca    8.6      07 Nov 2019 02:30  Phos  2.7     11-07  Mg     2.0     11-07    TPro  7.9  /  Alb  3.9  /  TBili  0.3  /  DBili  x   /  AST  91<H>  /  ALT  72  /  AlkPhos  48  11-07        CAPILLARY BLOOD GLUCOSE  Lipase, Serum: 453 U/L (11.07.19 @ 02:30)  Alcohol, Blood: 290: TOXIC CONCENTRATIONS (mg/dL):  Flushing, Slowing of  Reflexes, Impaired Visual Acuity:     Depression of CNS:    > 100  Fatalities Reported:       > 400  Results reported as a "< number" are below reliably detectable limits and  considered negative  These ranges are intended as general guidelines.  Alcohol metabolism can vary widely among individuals.  This test  is approved for clinical and not for forensic purposes. mg/dL (11.07.19 @ 02:30)                RADIOLOGY & ADDITIONAL TESTS:  CXR: no infiltrate  Imaging Personally Reviewed:  [x ] YES  [ ] NO  EKG: sinus@ 90 no acute ischemic changes

## 2019-11-07 NOTE — ED ADULT NURSE NOTE - OBJECTIVE STATEMENT
Received patient in bed 22. AOX4. Speech clear. pt co abdominal pain. + n/v. denies diarrhea. hx. ulcers  pt normally drinks 750 mL of vodka daily, has not drunk anything today. + shaking.  + anxious + tremors known ETOH abuse last drink yesterday. Awaiting to be seen

## 2019-11-08 DIAGNOSIS — E83.39 OTHER DISORDERS OF PHOSPHORUS METABOLISM: ICD-10-CM

## 2019-11-08 DIAGNOSIS — E83.42 HYPOMAGNESEMIA: ICD-10-CM

## 2019-11-08 LAB
ANION GAP SERPL CALC-SCNC: 7 MMOL/L — SIGNIFICANT CHANGE UP (ref 5–17)
BUN SERPL-MCNC: 7 MG/DL — SIGNIFICANT CHANGE UP (ref 7–23)
CALCIUM SERPL-MCNC: 8.3 MG/DL — LOW (ref 8.5–10.1)
CHLORIDE SERPL-SCNC: 106 MMOL/L — SIGNIFICANT CHANGE UP (ref 96–108)
CO2 SERPL-SCNC: 25 MMOL/L — SIGNIFICANT CHANGE UP (ref 22–31)
CREAT SERPL-MCNC: 0.71 MG/DL — SIGNIFICANT CHANGE UP (ref 0.5–1.3)
GLUCOSE SERPL-MCNC: 71 MG/DL — SIGNIFICANT CHANGE UP (ref 70–99)
HCT VFR BLD CALC: 37.9 % — LOW (ref 39–50)
HGB BLD-MCNC: 11.9 G/DL — LOW (ref 13–17)
MAGNESIUM SERPL-MCNC: 1.6 MG/DL — SIGNIFICANT CHANGE UP (ref 1.6–2.6)
MCHC RBC-ENTMCNC: 28.1 PG — SIGNIFICANT CHANGE UP (ref 27–34)
MCHC RBC-ENTMCNC: 31.4 GM/DL — LOW (ref 32–36)
MCV RBC AUTO: 89.6 FL — SIGNIFICANT CHANGE UP (ref 80–100)
NRBC # BLD: 0 /100 WBCS — SIGNIFICANT CHANGE UP (ref 0–0)
PHOSPHATE SERPL-MCNC: 2.2 MG/DL — LOW (ref 2.5–4.5)
PLATELET # BLD AUTO: 160 K/UL — SIGNIFICANT CHANGE UP (ref 150–400)
POTASSIUM SERPL-MCNC: 3.8 MMOL/L — SIGNIFICANT CHANGE UP (ref 3.5–5.3)
POTASSIUM SERPL-SCNC: 3.8 MMOL/L — SIGNIFICANT CHANGE UP (ref 3.5–5.3)
RBC # BLD: 4.23 M/UL — SIGNIFICANT CHANGE UP (ref 4.2–5.8)
RBC # FLD: 14.1 % — SIGNIFICANT CHANGE UP (ref 10.3–14.5)
SODIUM SERPL-SCNC: 138 MMOL/L — SIGNIFICANT CHANGE UP (ref 135–145)
WBC # BLD: 2.56 K/UL — LOW (ref 3.8–10.5)
WBC # FLD AUTO: 2.56 K/UL — LOW (ref 3.8–10.5)

## 2019-11-08 PROCEDURE — 99233 SBSQ HOSP IP/OBS HIGH 50: CPT

## 2019-11-08 RX ORDER — POTASSIUM PHOSPHATE, MONOBASIC POTASSIUM PHOSPHATE, DIBASIC 236; 224 MG/ML; MG/ML
15 INJECTION, SOLUTION INTRAVENOUS ONCE
Refills: 0 | Status: COMPLETED | OUTPATIENT
Start: 2019-11-08 | End: 2019-11-08

## 2019-11-08 RX ORDER — MAGNESIUM SULFATE 500 MG/ML
2 VIAL (ML) INJECTION ONCE
Refills: 0 | Status: COMPLETED | OUTPATIENT
Start: 2019-11-08 | End: 2019-11-08

## 2019-11-08 RX ORDER — ACETAMINOPHEN 500 MG
650 TABLET ORAL EVERY 6 HOURS
Refills: 0 | Status: DISCONTINUED | OUTPATIENT
Start: 2019-11-08 | End: 2019-11-10

## 2019-11-08 RX ADMIN — HEPARIN SODIUM 5000 UNIT(S): 5000 INJECTION INTRAVENOUS; SUBCUTANEOUS at 17:46

## 2019-11-08 RX ADMIN — Medication 100 MILLIGRAM(S): at 11:49

## 2019-11-08 RX ADMIN — Medication 1 MILLIGRAM(S): at 11:47

## 2019-11-08 RX ADMIN — PANTOPRAZOLE SODIUM 40 MILLIGRAM(S): 20 TABLET, DELAYED RELEASE ORAL at 17:46

## 2019-11-08 RX ADMIN — Medication 1.5 MILLIGRAM(S): at 05:18

## 2019-11-08 RX ADMIN — HEPARIN SODIUM 5000 UNIT(S): 5000 INJECTION INTRAVENOUS; SUBCUTANEOUS at 05:19

## 2019-11-08 RX ADMIN — Medication 1.5 MILLIGRAM(S): at 17:45

## 2019-11-08 RX ADMIN — Medication 50 GRAM(S): at 17:44

## 2019-11-08 RX ADMIN — Medication 1.5 MILLIGRAM(S): at 02:04

## 2019-11-08 RX ADMIN — Medication 1.5 MILLIGRAM(S): at 21:49

## 2019-11-08 RX ADMIN — Medication 1 TABLET(S): at 11:47

## 2019-11-08 RX ADMIN — Medication 1.5 MILLIGRAM(S): at 13:26

## 2019-11-08 RX ADMIN — PANTOPRAZOLE SODIUM 40 MILLIGRAM(S): 20 TABLET, DELAYED RELEASE ORAL at 05:19

## 2019-11-08 RX ADMIN — POTASSIUM PHOSPHATE, MONOBASIC POTASSIUM PHOSPHATE, DIBASIC 62.5 MILLIMOLE(S): 236; 224 INJECTION, SOLUTION INTRAVENOUS at 17:45

## 2019-11-08 RX ADMIN — Medication 1.5 MILLIGRAM(S): at 09:49

## 2019-11-08 RX ADMIN — SODIUM CHLORIDE 125 MILLILITER(S): 9 INJECTION, SOLUTION INTRAVENOUS at 07:19

## 2019-11-08 NOTE — SBIRT NOTE ADULT - NSSBIRTBRIEFINTDET_GEN_A_CORE
Sw spoke with pt. about getting some help with his sobriety. Pt. states he is not interested in going to inpatient substance abuse program or outpatient treatment at this time. Pt. feels that he can control his drinking on his own.

## 2019-11-08 NOTE — PROGRESS NOTE ADULT - ASSESSMENT
55 y/o male with PMH EtOH abuse, gastritis, HLD presents to ED for evaluation of abd pain with NB vomiting 1 days. Patient reports that pain is constant, in upper abdomen, aching, non radiating, has nausea with NBNB vomiting. His last drink of vodka was pta. Denies fevers, chills, chest pain, shortness of breath, dysuria, rhinorrhea, rash, bleeding, numbness, ha, vision loss; he didn't take anything for symptoms, in particular denies NSAIDS. Pt presents with alcohol withdrawal and abdominal pain, probably from gastritis.

## 2019-11-08 NOTE — PROGRESS NOTE ADULT - SUBJECTIVE AND OBJECTIVE BOX
Patient is a 54y old  Male who presents with a chief complaint of Abdominal pain. (07 Nov 2019 03:46)      OVERNIGHT EVENTS:  ciwa 7    REVIEW OF SYSTEMS: denies chest pain/SOB, diaphoresis, no F/C, cough, dizziness, headache, blurry vision, nausea, vomiting, abdominal pain. All others review of systems negative     MEDICATIONS  (STANDING):  folic acid 1 milliGRAM(s) Oral daily  heparin  Injectable 5000 Unit(s) SubCutaneous every 12 hours  influenza   Vaccine 0.5 milliLiter(s) IntraMuscular once  lactated ringers. 1000 milliLiter(s) (125 mL/Hr) IV Continuous <Continuous>  LORazepam   Injectable 1.5 milliGRAM(s) IV Push every 4 hours  LORazepam   Injectable   IV Push   multivitamin 1 Tablet(s) Oral daily  pantoprazole  Injectable 40 milliGRAM(s) IV Push every 12 hours  thiamine 100 milliGRAM(s) Oral daily    MEDICATIONS  (PRN):  ondansetron Injectable 4 milliGRAM(s) IV Push every 6 hours PRN Nausea and/or Vomiting      Allergies    No Known Allergies    Intolerances        T(F): 98.6 (11-08-19 @ 12:30), Max: 99.1 (11-07-19 @ 16:55)  HR: 76 (11-08-19 @ 12:30) (74 - 90)  BP: 135/92 (11-08-19 @ 12:30) (135/86 - 148/86)  RR: 18 (11-08-19 @ 12:30) (17 - 18)  SpO2: 97% (11-08-19 @ 12:30) (96% - 97%)  Wt(kg): --    PHYSICAL EXAM:  GENERAL: NAD, well-groomed, well-developed  HEAD:  Atraumatic, Normocephalic  EYES: EOMI, PERRLA, conjunctiva and sclera clear  ENMT: No tonsillar erythema, exudates, or enlargement; Moist mucous membranes, Good dentition, No lesions  NECK: Supple, No JVD, Normal thyroid  NERVOUS SYSTEM:  Alert & Oriented X3, Good concentration; Motor Strength 5/5 B/L upper and lower extremities; DTRs 2+ intact and symmetric  CHEST/LUNG: Clear to percussion bilaterally; No rales, rhonchi, wheezing, or rubs BL  HEART: Regular rate and rhythm; No murmurs, rubs, or gallops  ABDOMEN: Soft, Nontender, Nondistended; Bowel sounds present  EXTREMITIES:  2+ Peripheral Pulses, No clubbing, cyanosis, or edema BL LE  LYMPH: No lymphadenopathy noted  SKIN: No rashes or lesions    LABS:                        11.9   2.56  )-----------( 160      ( 08 Nov 2019 07:23 )             37.9     11-08    138  |  106  |  7   ----------------------------<  71  3.8   |  25  |  0.71    Ca    8.3<L>      08 Nov 2019 07:23  Phos  2.2     11-08  Mg     1.6     11-08    TPro  6.7  /  Alb  3.3  /  TBili  0.2  /  DBili  .07  /  AST  67<H>  /  ALT  57  /  AlkPhos  42  11-07        Cultures;   CAPILLARY BLOOD GLUCOSE        Lipid panel:   LDL 96            RADIOLOGY & ADDITIONAL TESTS:    Imaging Personally Reviewed:  [ ] YES      Consultant(s) Notes Reviewed:  [ ] YES     Care Discussed with [ ] Consultants [X ] Patient [ ] Family  [x ]    [x ]  Other; RN

## 2019-11-09 LAB
ANION GAP SERPL CALC-SCNC: 4 MMOL/L — LOW (ref 5–17)
BUN SERPL-MCNC: 3 MG/DL — LOW (ref 7–23)
CALCIUM SERPL-MCNC: 8.8 MG/DL — SIGNIFICANT CHANGE UP (ref 8.5–10.1)
CHLORIDE SERPL-SCNC: 106 MMOL/L — SIGNIFICANT CHANGE UP (ref 96–108)
CO2 SERPL-SCNC: 27 MMOL/L — SIGNIFICANT CHANGE UP (ref 22–31)
CREAT SERPL-MCNC: 0.73 MG/DL — SIGNIFICANT CHANGE UP (ref 0.5–1.3)
GLUCOSE SERPL-MCNC: 81 MG/DL — SIGNIFICANT CHANGE UP (ref 70–99)
MAGNESIUM SERPL-MCNC: 1.9 MG/DL — SIGNIFICANT CHANGE UP (ref 1.6–2.6)
PHOSPHATE SERPL-MCNC: 2.5 MG/DL — SIGNIFICANT CHANGE UP (ref 2.5–4.5)
POTASSIUM SERPL-MCNC: 4 MMOL/L — SIGNIFICANT CHANGE UP (ref 3.5–5.3)
POTASSIUM SERPL-SCNC: 4 MMOL/L — SIGNIFICANT CHANGE UP (ref 3.5–5.3)
SODIUM SERPL-SCNC: 137 MMOL/L — SIGNIFICANT CHANGE UP (ref 135–145)

## 2019-11-09 PROCEDURE — 99233 SBSQ HOSP IP/OBS HIGH 50: CPT

## 2019-11-09 RX ADMIN — SODIUM CHLORIDE 125 MILLILITER(S): 9 INJECTION, SOLUTION INTRAVENOUS at 04:19

## 2019-11-09 RX ADMIN — Medication 1 MILLIGRAM(S): at 05:33

## 2019-11-09 RX ADMIN — PANTOPRAZOLE SODIUM 40 MILLIGRAM(S): 20 TABLET, DELAYED RELEASE ORAL at 17:35

## 2019-11-09 RX ADMIN — HEPARIN SODIUM 5000 UNIT(S): 5000 INJECTION INTRAVENOUS; SUBCUTANEOUS at 05:34

## 2019-11-09 RX ADMIN — PANTOPRAZOLE SODIUM 40 MILLIGRAM(S): 20 TABLET, DELAYED RELEASE ORAL at 05:33

## 2019-11-09 RX ADMIN — Medication 650 MILLIGRAM(S): at 17:40

## 2019-11-09 RX ADMIN — Medication 1 MILLIGRAM(S): at 02:09

## 2019-11-09 RX ADMIN — Medication 650 MILLIGRAM(S): at 05:41

## 2019-11-09 RX ADMIN — Medication 100 MILLIGRAM(S): at 11:16

## 2019-11-09 RX ADMIN — Medication 1 MILLIGRAM(S): at 11:16

## 2019-11-09 RX ADMIN — HEPARIN SODIUM 5000 UNIT(S): 5000 INJECTION INTRAVENOUS; SUBCUTANEOUS at 17:34

## 2019-11-09 RX ADMIN — Medication 1 MILLIGRAM(S): at 13:18

## 2019-11-09 RX ADMIN — SODIUM CHLORIDE 125 MILLILITER(S): 9 INJECTION, SOLUTION INTRAVENOUS at 07:10

## 2019-11-09 RX ADMIN — Medication 650 MILLIGRAM(S): at 05:59

## 2019-11-09 RX ADMIN — Medication 1 MILLIGRAM(S): at 09:53

## 2019-11-09 RX ADMIN — Medication 1 MILLIGRAM(S): at 17:34

## 2019-11-09 RX ADMIN — Medication 1 TABLET(S): at 11:16

## 2019-11-09 RX ADMIN — Medication 650 MILLIGRAM(S): at 18:15

## 2019-11-09 RX ADMIN — Medication 1 MILLIGRAM(S): at 22:03

## 2019-11-09 NOTE — PROGRESS NOTE ADULT - SUBJECTIVE AND OBJECTIVE BOX
Patient is a 54y old  Male who presents with a chief complaint of Abdominal pain. (08 Nov 2019 16:02)      OVERNIGHT EVENTS: none    REVIEW OF SYSTEMS: denies chest pain/SOB, diaphoresis, no F/C, cough, dizziness, headache, blurry vision, nausea, vomiting, abdominal pain. All others review of systems negative     MEDICATIONS  (STANDING):  folic acid 1 milliGRAM(s) Oral daily  heparin  Injectable 5000 Unit(s) SubCutaneous every 12 hours  influenza   Vaccine 0.5 milliLiter(s) IntraMuscular once  lactated ringers. 1000 milliLiter(s) (125 mL/Hr) IV Continuous <Continuous>  LORazepam   Injectable 1 milliGRAM(s) IV Push every 4 hours  LORazepam   Injectable   IV Push   multivitamin 1 Tablet(s) Oral daily  pantoprazole  Injectable 40 milliGRAM(s) IV Push every 12 hours  thiamine 100 milliGRAM(s) Oral daily    MEDICATIONS  (PRN):  acetaminophen   Tablet .. 650 milliGRAM(s) Oral every 6 hours PRN Temp greater or equal to 38C (100.4F), Mild Pain (1 - 3)  ondansetron Injectable 4 milliGRAM(s) IV Push every 6 hours PRN Nausea and/or Vomiting      Allergies    No Known Allergies    Intolerances        T(F): 97.9 (11-09-19 @ 04:33), Max: 98.6 (11-08-19 @ 12:30)  HR: 65 (11-09-19 @ 04:33) (63 - 99)  BP: 137/89 (11-09-19 @ 04:33) (129/86 - 139/97)  RR: 18 (11-09-19 @ 04:33) (18 - 18)  SpO2: 96% (11-09-19 @ 04:33) (96% - 97%)  Wt(kg): --    PHYSICAL EXAM:  GENERAL: NAD, well-groomed, well-developed  HEAD:  Atraumatic, Normocephalic  EYES: EOMI, PERRLA, conjunctiva and sclera clear  ENMT: No tonsillar erythema, exudates, or enlargement; Moist mucous membranes, Good dentition, No lesions  NECK: Supple, No JVD, Normal thyroid  NERVOUS SYSTEM:  Alert & Oriented X3, Good concentration; Motor Strength 5/5 B/L upper and lower extremities; DTRs 2+ intact and symmetric  CHEST/LUNG: Clear to percussion bilaterally; No rales, rhonchi, wheezing, or rubs BL  HEART: Regular rate and rhythm; No murmurs, rubs, or gallops  ABDOMEN: Soft, Nontender, Nondistended; Bowel sounds present  EXTREMITIES:  2+ Peripheral Pulses, No clubbing, cyanosis, or edema BL LE  LYMPH: No lymphadenopathy noted  SKIN: No rashes or lesions    LABS:                        11.9   2.56  )-----------( 160      ( 08 Nov 2019 07:23 )             37.9     11-09    137  |  106  |  3<L>  ----------------------------<  81  4.0   |  27  |  0.73    Ca    8.8      09 Nov 2019 07:12  Phos  2.5     11-09  Mg     1.9     11-09          Cultures;   CAPILLARY BLOOD GLUCOSE        Lipid panel:   LDL 96            RADIOLOGY & ADDITIONAL TESTS:    Imaging Personally Reviewed:  [ ] YES      Consultant(s) Notes Reviewed:  [ ] YES     Care Discussed with [ ] Consultants [X ] Patient [ ] Family  [x ]    [x ]  Other; RN

## 2019-11-10 ENCOUNTER — TRANSCRIPTION ENCOUNTER (OUTPATIENT)
Age: 54
End: 2019-11-10

## 2019-11-10 VITALS
SYSTOLIC BLOOD PRESSURE: 132 MMHG | OXYGEN SATURATION: 100 % | DIASTOLIC BLOOD PRESSURE: 64 MMHG | HEART RATE: 77 BPM | RESPIRATION RATE: 18 BRPM | TEMPERATURE: 97 F

## 2019-11-10 LAB
ANION GAP SERPL CALC-SCNC: 7 MMOL/L — SIGNIFICANT CHANGE UP (ref 5–17)
BUN SERPL-MCNC: 4 MG/DL — LOW (ref 7–23)
CALCIUM SERPL-MCNC: 9 MG/DL — SIGNIFICANT CHANGE UP (ref 8.5–10.1)
CHLORIDE SERPL-SCNC: 108 MMOL/L — SIGNIFICANT CHANGE UP (ref 96–108)
CO2 SERPL-SCNC: 24 MMOL/L — SIGNIFICANT CHANGE UP (ref 22–31)
CREAT SERPL-MCNC: 0.73 MG/DL — SIGNIFICANT CHANGE UP (ref 0.5–1.3)
GLUCOSE SERPL-MCNC: 81 MG/DL — SIGNIFICANT CHANGE UP (ref 70–99)
PHOSPHATE SERPL-MCNC: 3.5 MG/DL — SIGNIFICANT CHANGE UP (ref 2.5–4.5)
POTASSIUM SERPL-MCNC: 3.6 MMOL/L — SIGNIFICANT CHANGE UP (ref 3.5–5.3)
POTASSIUM SERPL-SCNC: 3.6 MMOL/L — SIGNIFICANT CHANGE UP (ref 3.5–5.3)
SODIUM SERPL-SCNC: 139 MMOL/L — SIGNIFICANT CHANGE UP (ref 135–145)

## 2019-11-10 PROCEDURE — 99239 HOSP IP/OBS DSCHRG MGMT >30: CPT

## 2019-11-10 RX ADMIN — HEPARIN SODIUM 5000 UNIT(S): 5000 INJECTION INTRAVENOUS; SUBCUTANEOUS at 05:39

## 2019-11-10 RX ADMIN — Medication 1 MILLIGRAM(S): at 11:39

## 2019-11-10 RX ADMIN — Medication 0.5 MILLIGRAM(S): at 02:27

## 2019-11-10 RX ADMIN — Medication 100 MILLIGRAM(S): at 11:39

## 2019-11-10 RX ADMIN — INFLUENZA VIRUS VACCINE 0.5 MILLILITER(S): 15; 15; 15; 15 SUSPENSION INTRAMUSCULAR at 14:13

## 2019-11-10 RX ADMIN — HEPARIN SODIUM 5000 UNIT(S): 5000 INJECTION INTRAVENOUS; SUBCUTANEOUS at 17:51

## 2019-11-10 RX ADMIN — Medication 0.5 MILLIGRAM(S): at 09:49

## 2019-11-10 RX ADMIN — PANTOPRAZOLE SODIUM 40 MILLIGRAM(S): 20 TABLET, DELAYED RELEASE ORAL at 05:39

## 2019-11-10 RX ADMIN — Medication 1 TABLET(S): at 11:39

## 2019-11-10 RX ADMIN — Medication 0.5 MILLIGRAM(S): at 05:38

## 2019-11-10 NOTE — DISCHARGE NOTE NURSING/CASE MANAGEMENT/SOCIAL WORK - PATIENT PORTAL LINK FT
You can access the FollowMyHealth Patient Portal offered by [facility] by registering at the following website: [email or web address]. By joining [email or web address]’s FollowMyHealth portal, you will also be able to view your health information using other applications (apps) compatible with our system.

## 2019-11-10 NOTE — DISCHARGE NOTE PROVIDER - HOSPITAL COURSE
53 y/o male with PMH EtOH abuse, gastritis, HLD presents to ED for evaluation of abd pain with NB vomiting 1 days. Patient reports that pain is constant, in upper abdomen, aching, non radiating, has nausea with NBNB vomiting. His last drink of vodka was pta. Denies fevers, chills, chest pain, shortness of breath, dysuria, rhinorrhea, rash, bleeding, numbness, ha, vision loss; he didn't take anything for symptoms, in particular denies NSAIDS. Pt presents with alcohol withdrawal and abdominal pain, probably from gastritis. detox initiated. Clinically improved.

## 2019-11-10 NOTE — DISCHARGE NOTE PROVIDER - NSDCCPCAREPLAN_GEN_ALL_CORE_FT
PRINCIPAL DISCHARGE DIAGNOSIS  Diagnosis: Acute alcoholic gastritis without hemorrhage  Assessment and Plan of Treatment:       SECONDARY DISCHARGE DIAGNOSES  Diagnosis: Acute alcoholic gastritis without hemorrhage  Assessment and Plan of Treatment: Acute alcoholic gastritis without hemorrhage    Diagnosis: Hypomagnesemia  Assessment and Plan of Treatment: Hypomagnesemia    Diagnosis: Hypophosphatemia  Assessment and Plan of Treatment: Hypophosphatemia

## 2019-11-13 DIAGNOSIS — K29.20 ALCOHOLIC GASTRITIS WITHOUT BLEEDING: ICD-10-CM
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
## 2019-11-13 DIAGNOSIS — E83.39 OTHER DISORDERS OF PHOSPHORUS METABOLISM: ICD-10-CM

## 2019-11-13 DIAGNOSIS — R10.9 UNSPECIFIED ABDOMINAL PAIN: ICD-10-CM

## 2019-11-13 DIAGNOSIS — E83.42 HYPOMAGNESEMIA: ICD-10-CM

## 2019-11-13 DIAGNOSIS — E78.5 HYPERLIPIDEMIA, UNSPECIFIED: ICD-10-CM

## 2019-11-13 DIAGNOSIS — F10.239 ALCOHOL DEPENDENCE WITH WITHDRAWAL, UNSPECIFIED: ICD-10-CM

## 2019-11-21 ENCOUNTER — INPATIENT (INPATIENT)
Facility: HOSPITAL | Age: 54
LOS: 1 days | Discharge: HOME | End: 2019-11-23
Attending: INTERNAL MEDICINE | Admitting: INTERNAL MEDICINE
Payer: MEDICAID

## 2019-11-21 VITALS
DIASTOLIC BLOOD PRESSURE: 106 MMHG | TEMPERATURE: 99 F | OXYGEN SATURATION: 99 % | SYSTOLIC BLOOD PRESSURE: 129 MMHG | RESPIRATION RATE: 20 BRPM | HEART RATE: 149 BPM

## 2019-11-21 LAB
ALBUMIN SERPL ELPH-MCNC: 3.5 G/DL — SIGNIFICANT CHANGE UP (ref 3.3–5)
ALBUMIN SERPL ELPH-MCNC: 4.1 G/DL — SIGNIFICANT CHANGE UP (ref 3.3–5)
ALP SERPL-CCNC: 42 U/L — SIGNIFICANT CHANGE UP (ref 40–120)
ALP SERPL-CCNC: 53 U/L — SIGNIFICANT CHANGE UP (ref 40–120)
ALT FLD-CCNC: 67 U/L — SIGNIFICANT CHANGE UP (ref 12–78)
ALT FLD-CCNC: 84 U/L — HIGH (ref 12–78)
ANION GAP SERPL CALC-SCNC: 16 MMOL/L — SIGNIFICANT CHANGE UP (ref 5–17)
ANION GAP SERPL CALC-SCNC: 8 MMOL/L — SIGNIFICANT CHANGE UP (ref 5–17)
ANISOCYTOSIS BLD QL: SLIGHT — SIGNIFICANT CHANGE UP
APPEARANCE UR: CLEAR — SIGNIFICANT CHANGE UP
APTT BLD: 25.9 SEC — LOW (ref 27.5–36.3)
AST SERPL-CCNC: 119 U/L — HIGH (ref 15–37)
AST SERPL-CCNC: 89 U/L — HIGH (ref 15–37)
BACTERIA # UR AUTO: ABNORMAL
BASOPHILS # BLD AUTO: 0 K/UL — SIGNIFICANT CHANGE UP (ref 0–0.2)
BASOPHILS NFR BLD AUTO: 0 % — SIGNIFICANT CHANGE UP (ref 0–2)
BILIRUB DIRECT SERPL-MCNC: 0.25 MG/DL — HIGH (ref 0.05–0.2)
BILIRUB INDIRECT FLD-MCNC: 0.6 MG/DL — SIGNIFICANT CHANGE UP (ref 0.2–1)
BILIRUB SERPL-MCNC: 0.6 MG/DL — SIGNIFICANT CHANGE UP (ref 0.2–1.2)
BILIRUB SERPL-MCNC: 0.8 MG/DL — SIGNIFICANT CHANGE UP (ref 0.2–1.2)
BILIRUB UR-MCNC: NEGATIVE — SIGNIFICANT CHANGE UP
BUN SERPL-MCNC: 7 MG/DL — SIGNIFICANT CHANGE UP (ref 7–23)
BUN SERPL-MCNC: 8 MG/DL — SIGNIFICANT CHANGE UP (ref 7–23)
CALCIUM SERPL-MCNC: 10.2 MG/DL — HIGH (ref 8.5–10.1)
CALCIUM SERPL-MCNC: 8.6 MG/DL — SIGNIFICANT CHANGE UP (ref 8.5–10.1)
CHLORIDE SERPL-SCNC: 104 MMOL/L — SIGNIFICANT CHANGE UP (ref 96–108)
CHLORIDE SERPL-SCNC: 96 MMOL/L — SIGNIFICANT CHANGE UP (ref 96–108)
CO2 SERPL-SCNC: 26 MMOL/L — SIGNIFICANT CHANGE UP (ref 22–31)
CO2 SERPL-SCNC: 28 MMOL/L — SIGNIFICANT CHANGE UP (ref 22–31)
COLOR SPEC: YELLOW — SIGNIFICANT CHANGE UP
CREAT SERPL-MCNC: 1.01 MG/DL — SIGNIFICANT CHANGE UP (ref 0.5–1.3)
CREAT SERPL-MCNC: 1.11 MG/DL — SIGNIFICANT CHANGE UP (ref 0.5–1.3)
DIFF PNL FLD: NEGATIVE — SIGNIFICANT CHANGE UP
EOSINOPHIL # BLD AUTO: 0.05 K/UL — SIGNIFICANT CHANGE UP (ref 0–0.5)
EOSINOPHIL NFR BLD AUTO: 2 % — SIGNIFICANT CHANGE UP (ref 0–6)
ETHANOL SERPL-MCNC: 157 MG/DL — HIGH (ref 0–10)
GLUCOSE SERPL-MCNC: 133 MG/DL — HIGH (ref 70–99)
GLUCOSE SERPL-MCNC: 151 MG/DL — HIGH (ref 70–99)
GLUCOSE UR QL: NEGATIVE MG/DL — SIGNIFICANT CHANGE UP
HCT VFR BLD CALC: 45.3 % — SIGNIFICANT CHANGE UP (ref 39–50)
HGB BLD-MCNC: 14.8 G/DL — SIGNIFICANT CHANGE UP (ref 13–17)
INR BLD: 0.96 RATIO — SIGNIFICANT CHANGE UP (ref 0.88–1.16)
KETONES UR-MCNC: ABNORMAL
LEUKOCYTE ESTERASE UR-ACNC: NEGATIVE — SIGNIFICANT CHANGE UP
LIDOCAIN IGE QN: 272 U/L — SIGNIFICANT CHANGE UP (ref 73–393)
LYMPHOCYTES # BLD AUTO: 1.13 K/UL — SIGNIFICANT CHANGE UP (ref 1–3.3)
LYMPHOCYTES # BLD AUTO: 48 % — HIGH (ref 13–44)
MAGNESIUM SERPL-MCNC: 1.4 MG/DL — LOW (ref 1.6–2.6)
MAGNESIUM SERPL-MCNC: 1.7 MG/DL — SIGNIFICANT CHANGE UP (ref 1.6–2.6)
MANUAL SMEAR VERIFICATION: SIGNIFICANT CHANGE UP
MCHC RBC-ENTMCNC: 27.9 PG — SIGNIFICANT CHANGE UP (ref 27–34)
MCHC RBC-ENTMCNC: 32.7 GM/DL — SIGNIFICANT CHANGE UP (ref 32–36)
MCV RBC AUTO: 85.5 FL — SIGNIFICANT CHANGE UP (ref 80–100)
MICROCYTES BLD QL: SLIGHT — SIGNIFICANT CHANGE UP
MONOCYTES # BLD AUTO: 0.19 K/UL — SIGNIFICANT CHANGE UP (ref 0–0.9)
MONOCYTES NFR BLD AUTO: 8 % — SIGNIFICANT CHANGE UP (ref 2–14)
NEUTROPHILS # BLD AUTO: 0.99 K/UL — LOW (ref 1.8–7.4)
NEUTROPHILS NFR BLD AUTO: 42 % — LOW (ref 43–77)
NITRITE UR-MCNC: NEGATIVE — SIGNIFICANT CHANGE UP
NRBC # BLD: 0 /100 — SIGNIFICANT CHANGE UP (ref 0–0)
NRBC # BLD: SIGNIFICANT CHANGE UP /100 WBCS (ref 0–0)
PH UR: 7 — SIGNIFICANT CHANGE UP (ref 5–8)
PHOSPHATE SERPL-MCNC: 2.6 MG/DL — SIGNIFICANT CHANGE UP (ref 2.5–4.5)
PLAT MORPH BLD: NORMAL — SIGNIFICANT CHANGE UP
PLATELET # BLD AUTO: 251 K/UL — SIGNIFICANT CHANGE UP (ref 150–400)
POTASSIUM SERPL-MCNC: 3.8 MMOL/L — SIGNIFICANT CHANGE UP (ref 3.5–5.3)
POTASSIUM SERPL-MCNC: 3.8 MMOL/L — SIGNIFICANT CHANGE UP (ref 3.5–5.3)
POTASSIUM SERPL-SCNC: 3.8 MMOL/L — SIGNIFICANT CHANGE UP (ref 3.5–5.3)
POTASSIUM SERPL-SCNC: 3.8 MMOL/L — SIGNIFICANT CHANGE UP (ref 3.5–5.3)
PROT SERPL-MCNC: 7 GM/DL — SIGNIFICANT CHANGE UP (ref 6–8.3)
PROT SERPL-MCNC: 8.7 GM/DL — HIGH (ref 6–8.3)
PROT UR-MCNC: 15 MG/DL
PROTHROM AB SERPL-ACNC: 10.7 SEC — SIGNIFICANT CHANGE UP (ref 10–12.9)
RBC # BLD: 5.3 M/UL — SIGNIFICANT CHANGE UP (ref 4.2–5.8)
RBC # FLD: 15 % — HIGH (ref 10.3–14.5)
RBC BLD AUTO: SIGNIFICANT CHANGE UP
RBC CASTS # UR COMP ASSIST: NEGATIVE /HPF — SIGNIFICANT CHANGE UP (ref 0–4)
SODIUM SERPL-SCNC: 138 MMOL/L — SIGNIFICANT CHANGE UP (ref 135–145)
SODIUM SERPL-SCNC: 140 MMOL/L — SIGNIFICANT CHANGE UP (ref 135–145)
SP GR SPEC: 1.01 — SIGNIFICANT CHANGE UP (ref 1.01–1.02)
TROPONIN I SERPL-MCNC: <.015 NG/ML — SIGNIFICANT CHANGE UP (ref 0.01–0.04)
UROBILINOGEN FLD QL: NEGATIVE MG/DL — SIGNIFICANT CHANGE UP
WBC # BLD: 2.35 K/UL — LOW (ref 3.8–10.5)
WBC # FLD AUTO: 2.35 K/UL — LOW (ref 3.8–10.5)
WBC UR QL: NEGATIVE — SIGNIFICANT CHANGE UP

## 2019-11-21 PROCEDURE — 99285 EMERGENCY DEPT VISIT HI MDM: CPT

## 2019-11-21 PROCEDURE — 73130 X-RAY EXAM OF HAND: CPT | Mod: 26,RT

## 2019-11-21 PROCEDURE — 70450 CT HEAD/BRAIN W/O DYE: CPT | Mod: 26

## 2019-11-21 PROCEDURE — 70486 CT MAXILLOFACIAL W/O DYE: CPT | Mod: 26

## 2019-11-21 PROCEDURE — 74177 CT ABD & PELVIS W/CONTRAST: CPT | Mod: 26

## 2019-11-21 PROCEDURE — 73080 X-RAY EXAM OF ELBOW: CPT | Mod: 26,RT

## 2019-11-21 PROCEDURE — 71045 X-RAY EXAM CHEST 1 VIEW: CPT | Mod: 26

## 2019-11-21 PROCEDURE — 72125 CT NECK SPINE W/O DYE: CPT | Mod: 26

## 2019-11-21 PROCEDURE — 93010 ELECTROCARDIOGRAM REPORT: CPT

## 2019-11-21 PROCEDURE — 99223 1ST HOSP IP/OBS HIGH 75: CPT

## 2019-11-21 RX ORDER — SODIUM CHLORIDE 9 MG/ML
2000 INJECTION INTRAMUSCULAR; INTRAVENOUS; SUBCUTANEOUS ONCE
Refills: 0 | Status: COMPLETED | OUTPATIENT
Start: 2019-11-21 | End: 2019-11-21

## 2019-11-21 RX ORDER — ONDANSETRON 8 MG/1
8 TABLET, FILM COATED ORAL ONCE
Refills: 0 | Status: COMPLETED | OUTPATIENT
Start: 2019-11-21 | End: 2019-11-21

## 2019-11-21 RX ORDER — SODIUM CHLORIDE 9 MG/ML
1000 INJECTION, SOLUTION INTRAVENOUS
Refills: 0 | Status: COMPLETED | OUTPATIENT
Start: 2019-11-21 | End: 2019-11-21

## 2019-11-21 RX ORDER — LIDOCAINE 4 G/100G
10 CREAM TOPICAL ONCE
Refills: 0 | Status: COMPLETED | OUTPATIENT
Start: 2019-11-21 | End: 2019-11-21

## 2019-11-21 RX ORDER — PANTOPRAZOLE SODIUM 20 MG/1
40 TABLET, DELAYED RELEASE ORAL DAILY
Refills: 0 | Status: DISCONTINUED | OUTPATIENT
Start: 2019-11-21 | End: 2019-11-22

## 2019-11-21 RX ORDER — SUCRALFATE 1 G
1 TABLET ORAL ONCE
Refills: 0 | Status: COMPLETED | OUTPATIENT
Start: 2019-11-21 | End: 2019-11-21

## 2019-11-21 RX ORDER — FAMOTIDINE 10 MG/ML
20 INJECTION INTRAVENOUS ONCE
Refills: 0 | Status: COMPLETED | OUTPATIENT
Start: 2019-11-21 | End: 2019-11-21

## 2019-11-21 RX ADMIN — ONDANSETRON 8 MILLIGRAM(S): 8 TABLET, FILM COATED ORAL at 09:22

## 2019-11-21 RX ADMIN — FAMOTIDINE 20 MILLIGRAM(S): 10 INJECTION INTRAVENOUS at 09:24

## 2019-11-21 RX ADMIN — ONDANSETRON 8 MILLIGRAM(S): 8 TABLET, FILM COATED ORAL at 14:32

## 2019-11-21 RX ADMIN — SODIUM CHLORIDE 2000 MILLILITER(S): 9 INJECTION INTRAMUSCULAR; INTRAVENOUS; SUBCUTANEOUS at 09:38

## 2019-11-21 RX ADMIN — Medication 30 MILLILITER(S): at 09:39

## 2019-11-21 RX ADMIN — Medication 2 MILLIGRAM(S): at 22:03

## 2019-11-21 RX ADMIN — SODIUM CHLORIDE 200 MILLILITER(S): 9 INJECTION, SOLUTION INTRAVENOUS at 09:24

## 2019-11-21 RX ADMIN — PANTOPRAZOLE SODIUM 40 MILLIGRAM(S): 20 TABLET, DELAYED RELEASE ORAL at 20:42

## 2019-11-21 RX ADMIN — Medication 1 GRAM(S): at 11:10

## 2019-11-21 RX ADMIN — LIDOCAINE 10 MILLILITER(S): 4 CREAM TOPICAL at 11:10

## 2019-11-21 RX ADMIN — Medication 2 MILLIGRAM(S): at 09:23

## 2019-11-21 RX ADMIN — Medication 2 MILLIGRAM(S): at 11:26

## 2019-11-21 RX ADMIN — Medication 2 MILLIGRAM(S): at 18:06

## 2019-11-21 RX ADMIN — SODIUM CHLORIDE 2000 MILLILITER(S): 9 INJECTION INTRAMUSCULAR; INTRAVENOUS; SUBCUTANEOUS at 14:33

## 2019-11-21 NOTE — H&P ADULT - ASSESSMENT
53 y/o male with PMH EtOH abuse, gastritis, HLD presents to ED for evaluation of abd pain with NB vomiting 1 days. Patient reports that pain is constant, in upper abdomen, aching, non radiating, has nausea with NBNB vomiting. His last drink of vodka was pta. Denies fevers, chills, chest pain, shortness of breath, dysuria, rhinorrhea, rash, bleeding, numbness, ha, vision loss; he didn't take anything for symptoms, in particular denies NSAIDS. Pt presents with alcohol withdrawal and abdominal pain, probably from gastritis.  IMPROVE VTE Individual Risk Assessment          RISK                                                          Points    [  ] Previous VTE                                                3    [  ] Thrombophilia                                             2    [  ] Lower limb paralysis                                    2        (unable to hold up >15 seconds)      [  ] Current Cancer                                             2         (within 6 months)    [  ] Immobilization > 24 hrs                              1    [  ] ICU/CCU stay > 24 hours                            1    [  ] Age > 60                                                    1    IMPROVE VTE Score ____0_____

## 2019-11-21 NOTE — ED ADULT TRIAGE NOTE - CHIEF COMPLAINT QUOTE
BIBA complaining of abdominal pain, nausea and vomiting times 3 days. noted with tremors at triage. history of alcohol abuse. last drink 1 month ago.

## 2019-11-21 NOTE — ED PROVIDER NOTE - OBJECTIVE STATEMENT
Pertinent PMH/PSH/FHx/SHx and Review of Systems contained within:     55 y/o male with PMHx of  ETOH abuse, EOTH gastritis, and ETOH withdraw who presents with diffuse tremors, abdominal pain, N/V, and headache. Pt reports that he stopped drinking 2-3 days ago due to abdominal pain. He also reports a fall 2-3 days ago landing on his right hand. He denies any pain from fall, vision changes, fever, chills, chest pain, or SOB.     No fever/chills, No photophobia/eye pain/changes in vision, No ear pain/sore throat/dysphagia, No chest pain/palpitations, no SOB/cough/wheeze/stridor, + abdominal pain,  +N/V, no diarrhea, no dysuria/frequency/discharge, No neck/back pain, no rash, +headache, no changes in neurological status/function. Pertinent PMH/PSH/FHx/SHx and Review of Systems contained within:     55 y/o male with PMHx of  ETOH abuse, gastritis, and ETOH withdrawals who presents with diffuse tremors, abdominal pain, N/V, and headache. Pt reports that he stopped drinking 2-3 days ago due to abdominal pain. He also reports a fall 2-3 days ago landing on his right hand. He denies any pain from fall, vision changes, fever, chills, chest pain, or SOB.     No fever/chills, No photophobia/eye pain/changes in vision, No ear pain/sore throat/dysphagia, No chest pain/palpitations, no SOB/cough/wheeze/stridor, + abdominal pain,  +N/V, no diarrhea, no dysuria/frequency/discharge, No neck/back pain, no rash, +headache, no changes in neurological status/function.

## 2019-11-21 NOTE — H&P ADULT - NSHPPHYSICALEXAM_GEN_ALL_CORE
ICU Vital Signs Last 24 Hrs  T(C): 37.2 (21 Nov 2019 08:46), Max: 37.2 (21 Nov 2019 08:46)  T(F): 98.9 (21 Nov 2019 08:46), Max: 98.9 (21 Nov 2019 08:46)  HR: 89 (21 Nov 2019 10:18) (89 - 149)  BP: 134/80 (21 Nov 2019 10:18) (129/106 - 167/109)  BP(mean): 80 (21 Nov 2019 10:18) (80 - 80)  ABP: --  ABP(mean): --  RR: 20 (21 Nov 2019 10:18) (20 - 22)  SpO2: 99% (21 Nov 2019 10:18) (97% - 99%)      PHYSICAL EXAM:  GENERAL: NAD, well-groomed, well-developed, alcohol on breath  HEAD:  Atraumatic, Normocephalic  EYES: EOMI, PERRLA, conjunctiva and sclera clear  ENMT: No tonsillar erythema, exudates, or enlargement; Moist mucous membranes, No lesions  NECK: Supple, No JVD, Normal thyroid  NERVOUS SYSTEM:  Alert & Oriented X3, CN 2-12 intact, no focal deficits, + tremor  CHEST/LUNG: Clear to percussion bilaterally; No rales, rhonchi, wheezing, or rubs  HEART: Regular rate and rhythm; No murmurs, rubs, or gallops  ABDOMEN: Soft, epigastric tenderness, Nondistended; Bowel sounds present  EXTREMITIES:  + Peripheral Pulses, No clubbing, cyanosis, or edema  LYMPH: No lymphadenopathy noted  SKIN: No rashes or lesions

## 2019-11-21 NOTE — ED PROVIDER NOTE - PHYSICAL EXAMINATION
Gen: Alert, Well appearing. NAD, diffuse tremors  Head: NC, AT, PERRL, normal lids/conjunctiva   ENT: Bilateral TM WNL, patent oropharynx without erythema/exudate, uvula midline  Neck: supple, no tenderness/meningismus  Pulm: Bilateral clear BS, normal resp effort  CV: tachycardiac rate, regular rhythm, no M/R/G, +dist pulses   Abd: soft, mild epigastric tenderness, ND, +BS, no guarding/rebound tenderness  Mskel: extremities x4 with normal ROM. no ctl spine ttp. Right hand noted to be swollen, no erythema/cyanosis   Skin: no rash, no bruising  Neuro: AAOx3, no sensory/motor deficits, CN 2-12 intact

## 2019-11-21 NOTE — H&P ADULT - HISTORY OF PRESENT ILLNESS
53 y/o male with PMHx of  ETOH abuse, gastritis, and ETOH withdrawals who presents with diffuse tremors, abdominal pain, N/V, and headache. Pt reports that he stopped drinking 2-3 days ago due to abdominal pain. He also reports a fall 2-3 days ago landing on his right hand. He denies any pain from fall, vision changes, fever, chills, chest pain, or SOB.     	No fever/chills, No photophobia/eye pain/changes in vision, No ear pain/sore throat/dysphagia, No chest pain/palpitations, no SOB/cough/wheeze/stridor, + abdominal pain,  +N/V, no diarrhea, no dysuria/frequency/discharge, No neck/back pain, no rash, +headache, no changes in neurological status/function.

## 2019-11-21 NOTE — ED ADULT NURSE NOTE - NSIMPLEMENTINTERV_GEN_ALL_ED
Implemented All Fall Risk Interventions:  [place] to call system. Call bell, personal items and telephone within reach. Instruct patient to call for assistance. Room bathroom lighting operational. Non-slip footwear when patient is off stretcher. Physically safe environment: no spills, clutter or unnecessary equipment. Stretcher in lowest position, wheels locked, appropriate side rails in place. Provide visual cue, wrist band, yellow gown, etc. Monitor gait and stability. Monitor for mental status changes and reorient to person, place, and time. Review medications for side effects contributing to fall risk. Reinforce activity limits and safety measures with patient and family.

## 2019-11-22 LAB
ANION GAP SERPL CALC-SCNC: 8 MMOL/L — SIGNIFICANT CHANGE UP (ref 5–17)
BUN SERPL-MCNC: 9 MG/DL — SIGNIFICANT CHANGE UP (ref 7–23)
CALCIUM SERPL-MCNC: 8.2 MG/DL — LOW (ref 8.5–10.1)
CHLORIDE SERPL-SCNC: 104 MMOL/L — SIGNIFICANT CHANGE UP (ref 96–108)
CO2 SERPL-SCNC: 27 MMOL/L — SIGNIFICANT CHANGE UP (ref 22–31)
CREAT SERPL-MCNC: 0.9 MG/DL — SIGNIFICANT CHANGE UP (ref 0.5–1.3)
CULTURE RESULTS: NO GROWTH — SIGNIFICANT CHANGE UP
GLUCOSE SERPL-MCNC: 85 MG/DL — SIGNIFICANT CHANGE UP (ref 70–99)
MAGNESIUM SERPL-MCNC: 1.6 MG/DL — SIGNIFICANT CHANGE UP (ref 1.6–2.6)
PHOSPHATE SERPL-MCNC: 3.2 MG/DL — SIGNIFICANT CHANGE UP (ref 2.5–4.5)
POTASSIUM SERPL-MCNC: 3.7 MMOL/L — SIGNIFICANT CHANGE UP (ref 3.5–5.3)
POTASSIUM SERPL-SCNC: 3.7 MMOL/L — SIGNIFICANT CHANGE UP (ref 3.5–5.3)
SODIUM SERPL-SCNC: 139 MMOL/L — SIGNIFICANT CHANGE UP (ref 135–145)
SPECIMEN SOURCE: SIGNIFICANT CHANGE UP

## 2019-11-22 PROCEDURE — 99233 SBSQ HOSP IP/OBS HIGH 50: CPT

## 2019-11-22 RX ORDER — THIAMINE MONONITRATE (VIT B1) 100 MG
100 TABLET ORAL DAILY
Refills: 0 | Status: DISCONTINUED | OUTPATIENT
Start: 2019-11-22 | End: 2019-11-23

## 2019-11-22 RX ORDER — FOLIC ACID 0.8 MG
1 TABLET ORAL DAILY
Refills: 0 | Status: DISCONTINUED | OUTPATIENT
Start: 2019-11-22 | End: 2019-11-23

## 2019-11-22 RX ORDER — MAGNESIUM SULFATE 500 MG/ML
1 VIAL (ML) INJECTION ONCE
Refills: 0 | Status: COMPLETED | OUTPATIENT
Start: 2019-11-22 | End: 2019-11-22

## 2019-11-22 RX ORDER — ENOXAPARIN SODIUM 100 MG/ML
40 INJECTION SUBCUTANEOUS DAILY
Refills: 0 | Status: DISCONTINUED | OUTPATIENT
Start: 2019-11-22 | End: 2019-11-23

## 2019-11-22 RX ORDER — PANTOPRAZOLE SODIUM 20 MG/1
40 TABLET, DELAYED RELEASE ORAL
Refills: 0 | Status: DISCONTINUED | OUTPATIENT
Start: 2019-11-22 | End: 2019-11-23

## 2019-11-22 RX ADMIN — Medication 100 MILLIGRAM(S): at 12:06

## 2019-11-22 RX ADMIN — Medication 1 MILLIGRAM(S): at 12:06

## 2019-11-22 RX ADMIN — PANTOPRAZOLE SODIUM 40 MILLIGRAM(S): 20 TABLET, DELAYED RELEASE ORAL at 12:06

## 2019-11-22 RX ADMIN — Medication 1.5 MILLIGRAM(S): at 18:02

## 2019-11-22 RX ADMIN — Medication 2 MILLIGRAM(S): at 10:13

## 2019-11-22 RX ADMIN — ENOXAPARIN SODIUM 40 MILLIGRAM(S): 100 INJECTION SUBCUTANEOUS at 12:07

## 2019-11-22 RX ADMIN — Medication 2 MILLIGRAM(S): at 06:31

## 2019-11-22 RX ADMIN — Medication 2 MILLIGRAM(S): at 02:01

## 2019-11-22 RX ADMIN — Medication 100 GRAM(S): at 10:14

## 2019-11-22 RX ADMIN — Medication 2 MILLIGRAM(S): at 23:13

## 2019-11-22 RX ADMIN — Medication 1 TABLET(S): at 12:06

## 2019-11-22 RX ADMIN — Medication 2 MILLIGRAM(S): at 14:37

## 2019-11-22 NOTE — PROGRESS NOTE ADULT - PROBLEM SELECTOR PLAN 1
Multiple admission for ETOH withdrawals.   cont w/ CIWA protocol.   thiamine, folic acid and MVI for presumed deficiency   Pt was counseled about quitting alcohol

## 2019-11-22 NOTE — PROGRESS NOTE ADULT - SUBJECTIVE AND OBJECTIVE BOX
Patient is a 54y old  Male who presents with a chief complaint of tremors (2019 15:59)      INTERVAL HPI/ OVERNIGHT EVENTS: Pt was seen and examined at bedside today, No significant overnight events, pt denies feeling tremulous with treatment, denies hallucinations or other significant complaints.      MEDICATIONS  (STANDING):  enoxaparin Injectable 40 milliGRAM(s) SubCutaneous daily  folic acid 1 milliGRAM(s) Oral daily  LORazepam   Injectable 1.5 milliGRAM(s) IV Push every 4 hours  LORazepam   Injectable   IV Push   multivitamin 1 Tablet(s) Oral daily  pantoprazole  Injectable 40 milliGRAM(s) IV Push daily  thiamine 100 milliGRAM(s) Oral daily    MEDICATIONS  (PRN):      Allergies    No Known Allergies    Intolerances        REVIEW OF SYSTEMS:    Unable to examine due to [ ] Encephalopathy [ ] Advanced Dementia [ ] Expressive Aphasia [ ] Non-verbal patient    CONSTITUTIONAL: No fever, NO generalized weakness/Fatigue, No weight loss  EYES: No eye pain, visual disturbances, or discharge  ENMT:  No difficulty hearing, tinnitus, vertigo; No sinus or throat pain  NECK: No pain or stiffness  RESPIRATORY: No shortness of breath,  cough, wheezing, sputum or hemoptysis   CARDIOVASCULAR: No chest pain, palpitations, or leg swelling  GASTROINTESTINAL: No abdominal pain. No nausea, vomiting, diarrhea or constipation. No melena or hematochezia.  GENITOURINARY: No dysuria, frequency, hematuria, or incontinence  NEUROLOGICAL: No headaches, Dizziness, memory loss, loss of strength, numbness, or tremors  SKIN: No itching, burning, rashes, or lesions   MUSCULOSKELETAL: No joint pain or swelling; No muscle, back, or extremity pain  PSYCHIATRIC: No depression, anxiety, mood swings, or difficulty sleeping  HEME/LYMPH: No easy bruising, or bleeding gums      Vital Signs Last 24 Hrs  T(C): 37 (2019 16:53), Max: 37.6 (2019 21:16)  T(F): 98.6 (2019 16:53), Max: 99.7 (2019 21:16)  HR: 85 (2019 16:53) (73 - 95)  BP: 105/67 (2019 16:53) (105/67 - 138/84)  BP(mean): --  RR: 16 (2019 16:53) (16 - 18)  SpO2: 95% (2019 16:53) (95% - 98%)    PHYSICAL EXAM:  GENERAL: NAD, well-developed, well-groomed  HEAD:  Atraumatic, Normocephalic  EYES: conjunctiva and sclera clear  ENMT: Moist mucous membranes  NECK: Supple, No JVD, Normal thyroid  CHEST/LUNG: Clear to Auscultation bilaterally; No rales, rhonchi, wheezing, or rubs  HEART: Regular rate and rhythm; No murmurs, rubs, or gallops  ABDOMEN: Soft, Nontender, Nondistended; Bowel sounds present  EXTREMITIES:  2+ Peripheral Pulses, No clubbing, cyanosis, or edema  SKIN: No rashes or lesions  NERVOUS SYSTEM:  Alert & Oriented X3, Good concentration; Motor Strength 5/5 B/L upper and lower extremities    LABS:                        14.8   2.35  )-----------( 251      ( 2019 09:22 )             45.3         139  |  104  |  9   ----------------------------<  85  3.7   |  27  |  0.90    Ca    8.2<L>      2019 07:28  Phos  3.2       Mg     1.6         TPro  7.0  /  Alb  3.5  /  TBili  0.8  /  DBili  .25<H>  /  AST  89<H>  /  ALT  67  /  AlkPhos  42      PT/INR - ( 2019 09:22 )   PT: 10.7 sec;   INR: 0.96 ratio         PTT - ( 2019 09:22 )  PTT:25.9 sec  Urinalysis Basic - ( 2019 11:36 )    Color: Yellow / Appearance: Clear / S.010 / pH: x  Gluc: x / Ketone: Trace  / Bili: Negative / Urobili: Negative mg/dL   Blood: x / Protein: 15 mg/dL / Nitrite: Negative   Leuk Esterase: Negative / RBC: Negative /HPF / WBC Negative   Sq Epi: x / Non Sq Epi: x / Bacteria: Moderate      CAPILLARY BLOOD GLUCOSE            Culture - Urine (collected 19)  Source: .Urine Clean Catch (Midstream)  Final Report (19):    No growth        RADIOLOGY & ADDITIONAL TESTS:          Imaging Personally Reviewed:  [ ] YES  [ ] NO    Consultant(s) Notes Reviewed:  [ ] YES  [ ] NO    Care Discussed with Consultants/Other Providers [x ] YES  [ ] NO

## 2019-11-23 ENCOUNTER — TRANSCRIPTION ENCOUNTER (OUTPATIENT)
Age: 54
End: 2019-11-23

## 2019-11-23 VITALS
DIASTOLIC BLOOD PRESSURE: 96 MMHG | SYSTOLIC BLOOD PRESSURE: 170 MMHG | OXYGEN SATURATION: 95 % | TEMPERATURE: 98 F | HEART RATE: 62 BPM | RESPIRATION RATE: 17 BRPM

## 2019-11-23 PROCEDURE — 99233 SBSQ HOSP IP/OBS HIGH 50: CPT

## 2019-11-23 RX ADMIN — PANTOPRAZOLE SODIUM 40 MILLIGRAM(S): 20 TABLET, DELAYED RELEASE ORAL at 06:14

## 2019-11-23 RX ADMIN — Medication 1 MILLIGRAM(S): at 12:33

## 2019-11-23 RX ADMIN — Medication 1 TABLET(S): at 12:33

## 2019-11-23 RX ADMIN — Medication 100 MILLIGRAM(S): at 12:33

## 2019-11-23 NOTE — DISCHARGE NOTE PROVIDER - NSDCCPCAREPLAN_GEN_ALL_CORE_FT
PRINCIPAL DISCHARGE DIAGNOSIS  Diagnosis: Alcohol withdrawal  Assessment and Plan of Treatment: s/p CIWA protocol   continue with Thiamine and Folic acid supplement for presumed deficiency   Recommend to stop drinking alcohol completely      SECONDARY DISCHARGE DIAGNOSES  Diagnosis: Hepatic steatosis  Assessment and Plan of Treatment: Hepatic Steatosis with deranged LFT  Improving during hospital course.   Secondary to alcohol abuse.   Follow up with PCP for monintoring    Diagnosis: Gastritis  Assessment and Plan of Treatment: Presumed ETOH gastritis   continue with Sucralfate, protonix and avoid alcohol  Follow up with PCP for monintoring

## 2019-11-23 NOTE — DISCHARGE NOTE NURSING/CASE MANAGEMENT/SOCIAL WORK - PATIENT PORTAL LINK FT
You can access the FollowMyHealth Patient Portal offered by [facility] by registering at the following website: [email or web address]. By joining [facility]’s FollowMyHealth portal, you will also be able to view your health information using other applications (apps) compatible with our system.

## 2019-11-23 NOTE — DISCHARGE NOTE PROVIDER - HOSPITAL COURSE
55 y/o male with PMHx of  ETOH abuse, gastritis, and ETOH withdrawals who presents with diffuse tremors, abdominal pain, N/V, and headache. Pt reports that he stopped drinking 2-3 days ago due to abdominal pain. He also reports a fall 2-3 days ago landing on his right hand. He denies any pain from fall, vision changes, fever, chills, chest pain, or SOB.         The patient was admitted to telemetry under [facility] protocol. The patient is now AAOX4 and is no longer symptomatic.

## 2019-11-23 NOTE — DISCHARGE NOTE PROVIDER - NSDCMRMEDTOKEN_GEN_ALL_CORE_FT
folic acid 1 mg oral tablet: 1 tab(s) orally once a day  Multiple Vitamins oral tablet: 1 tab(s) orally once a day  pantoprazole 40 mg oral delayed release tablet: 1 tab(s) orally once a day   SEROquel 50 mg oral tablet: 1 tab(s) orally once a day  simvastatin 20 mg oral tablet: 1 tab(s) orally once a day (at bedtime)  sucralfate 1 g/10 mL oral suspension: 10 milliliter(s) orally 4 times a day  thiamine 100 mg oral tablet: 1 tab(s) orally once a day

## 2019-11-27 DIAGNOSIS — R10.9 UNSPECIFIED ABDOMINAL PAIN: ICD-10-CM

## 2019-11-27 DIAGNOSIS — K29.20 ALCOHOLIC GASTRITIS WITHOUT BLEEDING: ICD-10-CM

## 2019-11-27 DIAGNOSIS — K76.0 FATTY (CHANGE OF) LIVER, NOT ELSEWHERE CLASSIFIED: ICD-10-CM

## 2019-11-27 DIAGNOSIS — F10.239 ALCOHOL DEPENDENCE WITH WITHDRAWAL, UNSPECIFIED: ICD-10-CM

## 2019-12-01 ENCOUNTER — INPATIENT (INPATIENT)
Facility: HOSPITAL | Age: 54
LOS: 4 days | Discharge: HOME | End: 2019-12-06
Attending: INTERNAL MEDICINE | Admitting: INTERNAL MEDICINE
Payer: MEDICAID

## 2019-12-01 VITALS
TEMPERATURE: 98 F | HEIGHT: 67 IN | WEIGHT: 157.41 LBS | SYSTOLIC BLOOD PRESSURE: 149 MMHG | HEART RATE: 105 BPM | DIASTOLIC BLOOD PRESSURE: 106 MMHG | OXYGEN SATURATION: 100 % | RESPIRATION RATE: 20 BRPM

## 2019-12-01 LAB
ALBUMIN SERPL ELPH-MCNC: 3.8 G/DL — SIGNIFICANT CHANGE UP (ref 3.3–5)
ALP SERPL-CCNC: 52 U/L — SIGNIFICANT CHANGE UP (ref 40–120)
ALT FLD-CCNC: 91 U/L — HIGH (ref 12–78)
ANION GAP SERPL CALC-SCNC: 15 MMOL/L — SIGNIFICANT CHANGE UP (ref 5–17)
AST SERPL-CCNC: 89 U/L — HIGH (ref 15–37)
BILIRUB SERPL-MCNC: 0.4 MG/DL — SIGNIFICANT CHANGE UP (ref 0.2–1.2)
BUN SERPL-MCNC: 9 MG/DL — SIGNIFICANT CHANGE UP (ref 7–23)
CALCIUM SERPL-MCNC: 8.8 MG/DL — SIGNIFICANT CHANGE UP (ref 8.5–10.1)
CHLORIDE SERPL-SCNC: 103 MMOL/L — SIGNIFICANT CHANGE UP (ref 96–108)
CO2 SERPL-SCNC: 25 MMOL/L — SIGNIFICANT CHANGE UP (ref 22–31)
CREAT SERPL-MCNC: 0.85 MG/DL — SIGNIFICANT CHANGE UP (ref 0.5–1.3)
ETHANOL SERPL-MCNC: 306 MG/DL — HIGH (ref 0–10)
GLUCOSE SERPL-MCNC: 78 MG/DL — SIGNIFICANT CHANGE UP (ref 70–99)
HCT VFR BLD CALC: 43.8 % — SIGNIFICANT CHANGE UP (ref 39–50)
HGB BLD-MCNC: 14.5 G/DL — SIGNIFICANT CHANGE UP (ref 13–17)
LACTATE SERPL-SCNC: 4.7 MMOL/L — CRITICAL HIGH (ref 0.7–2)
LIDOCAIN IGE QN: 343 U/L — SIGNIFICANT CHANGE UP (ref 73–393)
MCHC RBC-ENTMCNC: 29.1 PG — SIGNIFICANT CHANGE UP (ref 27–34)
MCHC RBC-ENTMCNC: 33.1 GM/DL — SIGNIFICANT CHANGE UP (ref 32–36)
MCV RBC AUTO: 87.8 FL — SIGNIFICANT CHANGE UP (ref 80–100)
PLATELET # BLD AUTO: 239 K/UL — SIGNIFICANT CHANGE UP (ref 150–400)
POTASSIUM SERPL-MCNC: 4.2 MMOL/L — SIGNIFICANT CHANGE UP (ref 3.5–5.3)
POTASSIUM SERPL-SCNC: 4.2 MMOL/L — SIGNIFICANT CHANGE UP (ref 3.5–5.3)
PROT SERPL-MCNC: 8.1 GM/DL — SIGNIFICANT CHANGE UP (ref 6–8.3)
RBC # BLD: 4.99 M/UL — SIGNIFICANT CHANGE UP (ref 4.2–5.8)
RBC # FLD: 14.9 % — HIGH (ref 10.3–14.5)
SODIUM SERPL-SCNC: 143 MMOL/L — SIGNIFICANT CHANGE UP (ref 135–145)
WBC # BLD: 3.13 K/UL — LOW (ref 3.8–10.5)
WBC # FLD AUTO: 3.13 K/UL — LOW (ref 3.8–10.5)

## 2019-12-01 PROCEDURE — 71045 X-RAY EXAM CHEST 1 VIEW: CPT | Mod: 26

## 2019-12-01 PROCEDURE — 99284 EMERGENCY DEPT VISIT MOD MDM: CPT

## 2019-12-01 PROCEDURE — 93010 ELECTROCARDIOGRAM REPORT: CPT

## 2019-12-01 RX ORDER — SODIUM CHLORIDE 9 MG/ML
2000 INJECTION INTRAMUSCULAR; INTRAVENOUS; SUBCUTANEOUS ONCE
Refills: 0 | Status: COMPLETED | OUTPATIENT
Start: 2019-12-01 | End: 2019-12-01

## 2019-12-01 RX ORDER — FAMOTIDINE 10 MG/ML
20 INJECTION INTRAVENOUS ONCE
Refills: 0 | Status: COMPLETED | OUTPATIENT
Start: 2019-12-01 | End: 2019-12-01

## 2019-12-01 RX ADMIN — SODIUM CHLORIDE 2000 MILLILITER(S): 9 INJECTION INTRAMUSCULAR; INTRAVENOUS; SUBCUTANEOUS at 23:55

## 2019-12-01 RX ADMIN — FAMOTIDINE 20 MILLIGRAM(S): 10 INJECTION INTRAVENOUS at 23:21

## 2019-12-01 RX ADMIN — Medication 2 MILLIGRAM(S): at 23:21

## 2019-12-01 NOTE — ED ADULT TRIAGE NOTE - CHIEF COMPLAINT QUOTE
Pt pw visual tremor to b/l hands, and c/o severe headache . H/o ETOH Abuse, stated his last drink was on Thursday

## 2019-12-01 NOTE — ED PROVIDER NOTE - OBJECTIVE STATEMENT
54 year old male with PMH of PUD, gastritis and alcoholism presenting to ED due to shaking and feeling of general malaise, pt has recently been on 3 day binge. Pt otherwise states having some epigastric discomfort as well.

## 2019-12-01 NOTE — ED PROVIDER NOTE - RESPIRATORY, MLM
Notes recorded by Claudia Lozoya MD on 7/11/2019 at 3:07 PM CDT  Please call patient that lab results showed WBC down to 12.4    Breath sounds clear and equal bilaterally.

## 2019-12-01 NOTE — ED ADULT NURSE NOTE - OBJECTIVE STATEMENT
Patient appears to be withdrawing from alcohol. Last drink was last night. He drank more than 1 bottle of vodka.  Hand and arm tremors with headache.  Speaking coherently. H/o ETOH Abuse. Patient stated "Put injection in I want to die".  notified.

## 2019-12-01 NOTE — ED PROVIDER NOTE - CLINICAL SUMMARY MEDICAL DECISION MAKING FREE TEXT BOX
alcohol withdrawal improved after ativan 2mg - otherwise to admit for further care .Lactic acid 4.7 noted given 2 L NS.

## 2019-12-01 NOTE — ED PROVIDER NOTE - CONSTITUTIONAL, MLM
normal... Well appearing, awake, alert, oriented to person, place, time/situation and having tremors or hands

## 2019-12-02 DIAGNOSIS — K29.20 ALCOHOLIC GASTRITIS WITHOUT BLEEDING: ICD-10-CM

## 2019-12-02 DIAGNOSIS — E78.2 MIXED HYPERLIPIDEMIA: ICD-10-CM

## 2019-12-02 LAB
ANION GAP SERPL CALC-SCNC: 11 MMOL/L — SIGNIFICANT CHANGE UP (ref 5–17)
BASOPHILS # BLD AUTO: 0.05 K/UL — SIGNIFICANT CHANGE UP (ref 0–0.2)
BASOPHILS NFR BLD AUTO: 1.6 % — SIGNIFICANT CHANGE UP (ref 0–2)
BUN SERPL-MCNC: 8 MG/DL — SIGNIFICANT CHANGE UP (ref 7–23)
CALCIUM SERPL-MCNC: 7.5 MG/DL — LOW (ref 8.5–10.1)
CHLORIDE SERPL-SCNC: 108 MMOL/L — SIGNIFICANT CHANGE UP (ref 96–108)
CO2 SERPL-SCNC: 24 MMOL/L — SIGNIFICANT CHANGE UP (ref 22–31)
CREAT SERPL-MCNC: 0.69 MG/DL — SIGNIFICANT CHANGE UP (ref 0.5–1.3)
EOSINOPHIL # BLD AUTO: 0.09 K/UL — SIGNIFICANT CHANGE UP (ref 0–0.5)
EOSINOPHIL NFR BLD AUTO: 2.9 % — SIGNIFICANT CHANGE UP (ref 0–6)
GLUCOSE SERPL-MCNC: 76 MG/DL — SIGNIFICANT CHANGE UP (ref 70–99)
IMM GRANULOCYTES NFR BLD AUTO: 0.3 % — SIGNIFICANT CHANGE UP (ref 0–1.5)
LACTATE SERPL-SCNC: 3 MMOL/L — HIGH (ref 0.7–2)
LACTATE SERPL-SCNC: 4.3 MMOL/L — CRITICAL HIGH (ref 0.7–2)
LYMPHOCYTES # BLD AUTO: 2.2 K/UL — SIGNIFICANT CHANGE UP (ref 1–3.3)
LYMPHOCYTES # BLD AUTO: 70.3 % — HIGH (ref 13–44)
MAGNESIUM SERPL-MCNC: 1.8 MG/DL — SIGNIFICANT CHANGE UP (ref 1.6–2.6)
MANUAL SMEAR VERIFICATION: YES — SIGNIFICANT CHANGE UP
MONOCYTES # BLD AUTO: 0.3 K/UL — SIGNIFICANT CHANGE UP (ref 0–0.9)
MONOCYTES NFR BLD AUTO: 9.6 % — SIGNIFICANT CHANGE UP (ref 2–14)
NEUTROPHILS # BLD AUTO: 0.48 K/UL — LOW (ref 1.8–7.4)
NEUTROPHILS NFR BLD AUTO: 15.3 % — LOW (ref 43–77)
NRBC # BLD: 0 /100 WBCS — SIGNIFICANT CHANGE UP (ref 0–0)
PHOSPHATE SERPL-MCNC: 2.5 MG/DL — SIGNIFICANT CHANGE UP (ref 2.5–4.5)
PLAT MORPH BLD: NORMAL — SIGNIFICANT CHANGE UP
POTASSIUM SERPL-MCNC: 3.4 MMOL/L — LOW (ref 3.5–5.3)
POTASSIUM SERPL-SCNC: 3.4 MMOL/L — LOW (ref 3.5–5.3)
RBC BLD AUTO: NORMAL — SIGNIFICANT CHANGE UP
SODIUM SERPL-SCNC: 143 MMOL/L — SIGNIFICANT CHANGE UP (ref 135–145)

## 2019-12-02 PROCEDURE — 12345: CPT | Mod: NC

## 2019-12-02 PROCEDURE — 99223 1ST HOSP IP/OBS HIGH 75: CPT | Mod: AI

## 2019-12-02 RX ORDER — POTASSIUM CHLORIDE 20 MEQ
40 PACKET (EA) ORAL EVERY 4 HOURS
Refills: 0 | Status: COMPLETED | OUTPATIENT
Start: 2019-12-02 | End: 2019-12-03

## 2019-12-02 RX ORDER — QUETIAPINE FUMARATE 200 MG/1
50 TABLET, FILM COATED ORAL DAILY
Refills: 0 | Status: DISCONTINUED | OUTPATIENT
Start: 2019-12-02 | End: 2019-12-06

## 2019-12-02 RX ORDER — THIAMINE MONONITRATE (VIT B1) 100 MG
100 TABLET ORAL DAILY
Refills: 0 | Status: DISCONTINUED | OUTPATIENT
Start: 2019-12-02 | End: 2019-12-06

## 2019-12-02 RX ORDER — FOLIC ACID 0.8 MG
1 TABLET ORAL DAILY
Refills: 0 | Status: DISCONTINUED | OUTPATIENT
Start: 2019-12-02 | End: 2019-12-06

## 2019-12-02 RX ORDER — SIMVASTATIN 20 MG/1
20 TABLET, FILM COATED ORAL AT BEDTIME
Refills: 0 | Status: DISCONTINUED | OUTPATIENT
Start: 2019-12-02 | End: 2019-12-06

## 2019-12-02 RX ORDER — SUCRALFATE 1 G
1 TABLET ORAL ONCE
Refills: 0 | Status: COMPLETED | OUTPATIENT
Start: 2019-12-02 | End: 2019-12-02

## 2019-12-02 RX ORDER — SODIUM CHLORIDE 9 MG/ML
1000 INJECTION INTRAMUSCULAR; INTRAVENOUS; SUBCUTANEOUS
Refills: 0 | Status: DISCONTINUED | OUTPATIENT
Start: 2019-12-02 | End: 2019-12-06

## 2019-12-02 RX ORDER — PANTOPRAZOLE SODIUM 20 MG/1
40 TABLET, DELAYED RELEASE ORAL
Refills: 0 | Status: DISCONTINUED | OUTPATIENT
Start: 2019-12-02 | End: 2019-12-06

## 2019-12-02 RX ADMIN — Medication 1 MILLIGRAM(S): at 11:06

## 2019-12-02 RX ADMIN — Medication 2 MILLIGRAM(S): at 01:52

## 2019-12-02 RX ADMIN — SIMVASTATIN 20 MILLIGRAM(S): 20 TABLET, FILM COATED ORAL at 22:26

## 2019-12-02 RX ADMIN — Medication 2 MILLIGRAM(S): at 22:25

## 2019-12-02 RX ADMIN — Medication 1 TABLET(S): at 11:06

## 2019-12-02 RX ADMIN — QUETIAPINE FUMARATE 50 MILLIGRAM(S): 200 TABLET, FILM COATED ORAL at 12:07

## 2019-12-02 RX ADMIN — Medication 40 MILLIEQUIVALENT(S): at 22:31

## 2019-12-02 RX ADMIN — SODIUM CHLORIDE 2000 MILLILITER(S): 9 INJECTION INTRAMUSCULAR; INTRAVENOUS; SUBCUTANEOUS at 01:41

## 2019-12-02 RX ADMIN — Medication 100 MILLIGRAM(S): at 11:06

## 2019-12-02 RX ADMIN — SODIUM CHLORIDE 120 MILLILITER(S): 9 INJECTION INTRAMUSCULAR; INTRAVENOUS; SUBCUTANEOUS at 11:06

## 2019-12-02 RX ADMIN — SODIUM CHLORIDE 120 MILLILITER(S): 9 INJECTION INTRAMUSCULAR; INTRAVENOUS; SUBCUTANEOUS at 04:30

## 2019-12-02 RX ADMIN — Medication 2 MILLIGRAM(S): at 10:55

## 2019-12-02 RX ADMIN — PANTOPRAZOLE SODIUM 40 MILLIGRAM(S): 20 TABLET, DELAYED RELEASE ORAL at 06:09

## 2019-12-02 RX ADMIN — Medication 2 MILLIGRAM(S): at 05:14

## 2019-12-02 RX ADMIN — Medication 2 MILLIGRAM(S): at 18:31

## 2019-12-02 RX ADMIN — SODIUM CHLORIDE 120 MILLILITER(S): 9 INJECTION INTRAMUSCULAR; INTRAVENOUS; SUBCUTANEOUS at 22:26

## 2019-12-02 RX ADMIN — Medication 2 MILLIGRAM(S): at 22:40

## 2019-12-02 RX ADMIN — Medication 2 MILLIGRAM(S): at 14:46

## 2019-12-02 RX ADMIN — Medication 1 GRAM(S): at 01:21

## 2019-12-02 NOTE — H&P ADULT - ASSESSMENT
55 y/o male w/PMH gerd, HLD, etoh abuse presents with complaint of  intoxication and generalized malaise

## 2019-12-02 NOTE — H&P ADULT - NSHPPHYSICALEXAM_GEN_ALL_CORE
Vital Signs Last 24 Hrs  T(C): 36.8 (01 Dec 2019 21:25), Max: 36.8 (01 Dec 2019 21:25)  T(F): 98.3 (01 Dec 2019 21:25), Max: 98.3 (01 Dec 2019 21:25)  HR: 78 (02 Dec 2019 00:51) (70 - 105)  BP: 115/73 (02 Dec 2019 00:51) (109/70 - 149/106)  BP(mean): --  RR: 13 (02 Dec 2019 00:51) (13 - 20)  SpO2: 98% (02 Dec 2019 00:51) (97% - 100%)    PHYSICAL EXAM:    GENERAL: NAD, well-groomed, well-developed  HEAD:  Atraumatic, Normocephalic  EYES: EOMI, PERRLA, conjunctiva and sclera clear  ENMT: No tonsillar erythema, exudates, or enlargement; Moist mucous membranes, No lesions  NECK: Supple, No JVD, Normal thyroid  NERVOUS SYSTEM:  Alert & Oriented X3, Good concentration; Motor Strength 5/5 B/L upper and lower extremities; DTRs 2+ intact and symmetric  CHEST/LUNG: Clear to percussion bilaterally; No rales, rhonchi, wheezing, or rubs  HEART: Regular rate and rhythm; No rubs, or gallops, +S1,S2  ABDOMEN: +epigastric tenderness, +bs, no rebound or guarding  EXTREMITIES:  2+ Peripheral Pulses, No clubbing, cyanosis, or edema  LYMPH: No cervical adenopathy  RECTAL: deferred  BREAST: No palpatble masses, skin no lesions   : deferred  SKIN: No rashes or lesions    IMPROVE VTE Individual Risk Assessment          RISK                                                          Points  [  ] Previous VTE                                                3  [  ] Thrombophilia                                             2  [  ] Lower limb paralysis                                    2        (unable to hold up >15 seconds)    [  ] Current Cancer                                             2         (within 6 months)  [  ] Immobilization > 24 hrs                              1  [  ] ICU/CCU stay > 24 hours                            1  [  ] Age > 60                                                    1  IMPROVE VTE Score ____0_____

## 2019-12-02 NOTE — CHART NOTE - NSCHARTNOTEFT_GEN_A_CORE
53 y/o male w/PMH gerd, HLD, etoh abuse presents with complaint of  abdominal pain and generalized fatigue.  Pt states he has been drinking a lot for 3 days and has some abdominal discomfort and feeling generalized malaise, no hematemesis or melena or brbpr.    pt denies any fever, chills, sob, cp, palpitations, +n/+v/-d/-c no travels or sick contacts no hematemesis or melena.  Continue Ativan per CIWA protocol  PPI  Repeat LA  Continue IVF

## 2019-12-02 NOTE — H&P ADULT - NSHPLABSRESULTS_GEN_ALL_CORE
LABS:                        14.5   3.13  )-----------( 239      ( 01 Dec 2019 23:16 )             43.8     12-01    143  |  103  |  9   ----------------------------<  78  4.2   |  25  |  0.85    Ca    8.8      01 Dec 2019 23:16    TPro  8.1  /  Alb  3.8  /  TBili  0.4  /  DBili  x   /  AST  89<H>  /  ALT  91<H>  /  AlkPhos  52  12-01        CAPILLARY BLOOD GLUCOSE            RADIOLOGY & ADDITIONAL TESTS:    Imaging Personally Reviewed:  [ X] YES  [ ] NO

## 2019-12-02 NOTE — H&P ADULT - HISTORY OF PRESENT ILLNESS
Pt is a 53 y/o male w/PMH gerd, HLD, etoh abuse presents with complaint of  abdominal pain and generalized fatigue.  Pt states he has been drinking alot for 3 days and has some abdominal discomfort and feeling feneralized malaise, no hematemesis or melena or brbpr.    pt denies any fever, chills, sob, cp, palpitations, +n/+v/-d/-c no travels or sick contacts no hematemesis or melena.

## 2019-12-03 LAB
ALBUMIN SERPL ELPH-MCNC: 3.2 G/DL — LOW (ref 3.3–5)
ALP SERPL-CCNC: 42 U/L — SIGNIFICANT CHANGE UP (ref 40–120)
ALT FLD-CCNC: 63 U/L — SIGNIFICANT CHANGE UP (ref 12–78)
ANION GAP SERPL CALC-SCNC: 6 MMOL/L — SIGNIFICANT CHANGE UP (ref 5–17)
APTT BLD: 23 SEC — LOW (ref 27.5–36.3)
AST SERPL-CCNC: 58 U/L — HIGH (ref 15–37)
BILIRUB SERPL-MCNC: 1.1 MG/DL — SIGNIFICANT CHANGE UP (ref 0.2–1.2)
BUN SERPL-MCNC: 6 MG/DL — LOW (ref 7–23)
CALCIUM SERPL-MCNC: 8.8 MG/DL — SIGNIFICANT CHANGE UP (ref 8.5–10.1)
CHLORIDE SERPL-SCNC: 110 MMOL/L — HIGH (ref 96–108)
CO2 SERPL-SCNC: 26 MMOL/L — SIGNIFICANT CHANGE UP (ref 22–31)
CREAT SERPL-MCNC: 0.68 MG/DL — SIGNIFICANT CHANGE UP (ref 0.5–1.3)
GLUCOSE SERPL-MCNC: 91 MG/DL — SIGNIFICANT CHANGE UP (ref 70–99)
HCT VFR BLD CALC: 36.5 % — LOW (ref 39–50)
HGB BLD-MCNC: 11.8 G/DL — LOW (ref 13–17)
INR BLD: 1 RATIO — SIGNIFICANT CHANGE UP (ref 0.88–1.16)
LACTATE SERPL-SCNC: 1.2 MMOL/L — SIGNIFICANT CHANGE UP (ref 0.7–2)
MAGNESIUM SERPL-MCNC: 1.7 MG/DL — SIGNIFICANT CHANGE UP (ref 1.6–2.6)
MCHC RBC-ENTMCNC: 29 PG — SIGNIFICANT CHANGE UP (ref 27–34)
MCHC RBC-ENTMCNC: 32.3 GM/DL — SIGNIFICANT CHANGE UP (ref 32–36)
MCV RBC AUTO: 89.7 FL — SIGNIFICANT CHANGE UP (ref 80–100)
NRBC # BLD: 0 /100 WBCS — SIGNIFICANT CHANGE UP (ref 0–0)
PLATELET # BLD AUTO: 181 K/UL — SIGNIFICANT CHANGE UP (ref 150–400)
POTASSIUM SERPL-MCNC: 4.6 MMOL/L — SIGNIFICANT CHANGE UP (ref 3.5–5.3)
POTASSIUM SERPL-SCNC: 4.6 MMOL/L — SIGNIFICANT CHANGE UP (ref 3.5–5.3)
PROT SERPL-MCNC: 6.7 GM/DL — SIGNIFICANT CHANGE UP (ref 6–8.3)
PROTHROM AB SERPL-ACNC: 11.2 SEC — SIGNIFICANT CHANGE UP (ref 10–12.9)
RBC # BLD: 4.07 M/UL — LOW (ref 4.2–5.8)
RBC # FLD: 13.9 % — SIGNIFICANT CHANGE UP (ref 10.3–14.5)
SODIUM SERPL-SCNC: 142 MMOL/L — SIGNIFICANT CHANGE UP (ref 135–145)
WBC # BLD: 2.91 K/UL — LOW (ref 3.8–10.5)
WBC # FLD AUTO: 2.91 K/UL — LOW (ref 3.8–10.5)

## 2019-12-03 PROCEDURE — 36410 VNPNXR 3YR/> PHY/QHP DX/THER: CPT

## 2019-12-03 PROCEDURE — 76937 US GUIDE VASCULAR ACCESS: CPT | Mod: 26

## 2019-12-03 PROCEDURE — 99233 SBSQ HOSP IP/OBS HIGH 50: CPT

## 2019-12-03 RX ORDER — CHLORHEXIDINE GLUCONATE 213 G/1000ML
1 SOLUTION TOPICAL DAILY
Refills: 0 | Status: DISCONTINUED | OUTPATIENT
Start: 2019-12-03 | End: 2019-12-06

## 2019-12-03 RX ADMIN — CHLORHEXIDINE GLUCONATE 1 APPLICATION(S): 213 SOLUTION TOPICAL at 18:05

## 2019-12-03 RX ADMIN — Medication 2 MILLIGRAM(S): at 23:46

## 2019-12-03 RX ADMIN — Medication 1.5 MILLIGRAM(S): at 10:09

## 2019-12-03 RX ADMIN — Medication 1.5 MILLIGRAM(S): at 14:28

## 2019-12-03 RX ADMIN — SIMVASTATIN 20 MILLIGRAM(S): 20 TABLET, FILM COATED ORAL at 21:45

## 2019-12-03 RX ADMIN — Medication 100 MILLIGRAM(S): at 12:11

## 2019-12-03 RX ADMIN — Medication 1.5 MILLIGRAM(S): at 02:27

## 2019-12-03 RX ADMIN — Medication 2 MILLIGRAM(S): at 00:37

## 2019-12-03 RX ADMIN — Medication 40 MILLIEQUIVALENT(S): at 05:01

## 2019-12-03 RX ADMIN — SODIUM CHLORIDE 120 MILLILITER(S): 9 INJECTION INTRAMUSCULAR; INTRAVENOUS; SUBCUTANEOUS at 05:02

## 2019-12-03 RX ADMIN — Medication 1 MILLIGRAM(S): at 12:11

## 2019-12-03 RX ADMIN — Medication 1 TABLET(S): at 12:11

## 2019-12-03 RX ADMIN — Medication 1.5 MILLIGRAM(S): at 18:01

## 2019-12-03 RX ADMIN — SODIUM CHLORIDE 120 MILLILITER(S): 9 INJECTION INTRAMUSCULAR; INTRAVENOUS; SUBCUTANEOUS at 21:46

## 2019-12-03 RX ADMIN — Medication 40 MILLIEQUIVALENT(S): at 02:39

## 2019-12-03 RX ADMIN — QUETIAPINE FUMARATE 50 MILLIGRAM(S): 200 TABLET, FILM COATED ORAL at 14:29

## 2019-12-03 RX ADMIN — SODIUM CHLORIDE 120 MILLILITER(S): 9 INJECTION INTRAMUSCULAR; INTRAVENOUS; SUBCUTANEOUS at 18:08

## 2019-12-03 RX ADMIN — Medication 1.5 MILLIGRAM(S): at 21:40

## 2019-12-03 RX ADMIN — PANTOPRAZOLE SODIUM 40 MILLIGRAM(S): 20 TABLET, DELAYED RELEASE ORAL at 05:02

## 2019-12-03 RX ADMIN — Medication 1.5 MILLIGRAM(S): at 05:02

## 2019-12-03 NOTE — PROGRESS NOTE ADULT - SUBJECTIVE AND OBJECTIVE BOX
Patient is a 54y old  Male who presents with a chief complaint of malaise (02 Dec 2019 01:10)      INTERVAL HPI/OVERNIGHT EVENTS:  Pt was seen and examined, no acute events.    MEDICATIONS  (STANDING):  chlorhexidine 4% Liquid 1 Application(s) Topical daily  folic acid 1 milliGRAM(s) Oral daily  LORazepam   Injectable 1.5 milliGRAM(s) IV Push every 4 hours  LORazepam   Injectable   IV Push   multivitamin 1 Tablet(s) Oral daily  pantoprazole    Tablet 40 milliGRAM(s) Oral before breakfast  QUEtiapine 50 milliGRAM(s) Oral daily  simvastatin 20 milliGRAM(s) Oral at bedtime  sodium chloride 0.9%. 1000 milliLiter(s) (120 mL/Hr) IV Continuous <Continuous>  thiamine 100 milliGRAM(s) Oral daily    MEDICATIONS  (PRN):  LORazepam   Injectable 2 milliGRAM(s) IV Push every 1 hour PRN CIWA-Ar score 8 or greater      Allergies  No Known Allergies        Vital Signs Last 24 Hrs  T(C): 36.6 (03 Dec 2019 17:40), Max: 37.3 (02 Dec 2019 23:45)  T(F): 97.8 (03 Dec 2019 17:40), Max: 99.1 (02 Dec 2019 23:45)  HR: 67 (03 Dec 2019 17:40) (66 - 87)  BP: 145/97 (03 Dec 2019 17:40) (121/82 - 145/97)  BP(mean): --  RR: 18 (03 Dec 2019 17:40) (18 - 19)  SpO2: 98% (03 Dec 2019 17:40) (98% - 99%)      PHYSICAL EXAM:  GENERAL: NAD  HEAD:  Atraumatic  EYES: PERRLA  NERVOUS SYSTEM:  A, O x 3, non focal  CHEST/LUNG: Clear  HEART: RRR  ABDOMEN: Soft, non tender  EXTREMITIES: no edema        LABS:                        11.8   2.91  )-----------( 181      ( 03 Dec 2019 07:46 )             36.5     12-03    142  |  110<H>  |  6<L>  ----------------------------<  91  4.6   |  26  |  0.68    Ca    8.8      03 Dec 2019 07:46  Phos  2.8     12-03  Mg     1.7     12-03    TPro  6.7  /  Alb  3.2<L>  /  TBili  1.1  /  DBili  x   /  AST  58<H>  /  ALT  63  /  AlkPhos  42  12-03    PT/INR - ( 03 Dec 2019 11:50 )   PT: 11.2 sec;   INR: 1.00 ratio         PTT - ( 03 Dec 2019 11:50 )  PTT:23.0 sec    CAPILLARY BLOOD GLUCOSE          RADIOLOGY & ADDITIONAL TESTS:    Imaging Personally Reviewed:  [ ] YES  [ ] NO    Consultant(s) Notes Reviewed:  [ ] YES  [ ] NO    Care Discussed with Consultants/Other Providers [ ] YES  [ ] NO

## 2019-12-03 NOTE — ED ADULT TRIAGE NOTE - WEIGHT IN LBS
Reason for visit:  Persistent AF and atrial flutter    Impression:   · Persistent, drug refractory (Rythmol, Sotalol) atrial fibrillation (unknown duration 09/2012 to 12/5/12),  S/P AF ablation 8/1/16 with recurrence during blanking period S/P DCCV 8/21/16  · Atrial flutter, likely typical, S/P DCCV 5/5/17    ? Symptomatic with chest pain and dyspnea on exertion and palpitations  ? Diagnosed 09/2012  ? Previously on Multaq, discontinued due to elevated LFTs  ? Failed Rythmol   ? S/P DAVID/DCCV 12/5/12  ? Established with EP 5/20/16, initiated on Amiodarone   ? S/P DCCV 6/15/16, developed flash pulmonary edema   ? S/P AF RF ablation with PVI 8/1/16, during procedure noted to have severe increased left atrial pressure of 45/21. Developed recurrence of AF during blanking period  on 8/5/16   ? S/P DCCV 8/21/16  ? Amiodarone discontinued 11/9/16 due to maintenance of SR   ? Recurrent AF post MV repair 12/13/16   ? Loaded on Sotalol 12/15/16   ? S/P unsuccessful DCCV 12/22/16   ? Breakthrough of likely atypical atrial flutter while on Sotalol, discontinued  5/2/17  ? S/P DCCV 5/5/17, Amiodarone resumed on 5/5/17 and discontinued on 12/5/17 due to maintenance of SR and risks for developing long-term side effects    ? Resumed Sotalol 2/5/18, recurrent atrial flutter 12/26/18 (outpatient), converted on own  ? 12/3/19:  Remains in sinus rhythm on sotalol   · High risk medication use on Sotalol 120 mg BID  ? Resumed on 2/5/18  · CHADVASc 3 (HTN,CAD, age 67)  · Anticoagulation on Warfarin  ? Restarted 1/7/19 (without bridging), held from 1/11/19 for CT placement, resumed on 1/13/19   ? Follows with PCP   ? Previously on Xarelto, discontinued during admission due to valvular disease  · Right bundle branch block   · Mild coronary artery disease   ? Cardiologist: Dr. Savage  · Other medical history   ? S/p VATS for recurrent left pleural effusion 1/3/19, cytology negative  ? Severe mitral valve regurgitation, S/P MV repair on 12/13/16  (review of previous echos show that degree of MR was likely underestimated by official reads)   ? Obstructive sleep apnea, on CPAP  · ECG/Monitors  ? ECG 12/3/19:  Sinus bradycardia/sinus arrhythmia, RBBB, JT approximately 340-360 ms  ? 10 day cardiac monitor 10/7/19:  Sinus rhythm, no AF or atrial flutter, symptoms occurred with sinus rhythm and infrequent PVCs  ? ECG 4/23/19:  Sinus rhythm, RBBB, JT approximately 300 ms  ? ECG 1/4/19-sinus bradycardia 57 bpm, right bundle branch block, left anterior fascicular block, QTc 457 ms   ? ECG 1/3/19-normal sinus rhythm 77 bpm, right bundle branch block, , QTc 504 ms  ? ECG 12/26/18: Likely atrial flutter with  bpmECG 8/28/18: NSR, HR 64 bpm, RBBB,  ms, QTc 454/468 ms   ? ECG 3/9/18: SB rate 53 with PACs, RBBB ( ms) and QTc 461 ms   ? EKG 5/2/17: Likely atypical AFL, QTc 550 ms  ? 48 hr Holter monitor 11/2015: SB, SR infrequent PACs/PVCs, no AF   ? EKG 9/27/12: AF   · Cardiac Imaging  ? Echo 11/16/18: EF 63%, grade II/IV diastolic dysfunction, severe left atrial enlargement, IVS/LVPW 1.1, ANGEL LUIS 55.9 mL/m²   Echo 12/26/17: EF 65%, grade II/IV diastolic dysfunction, IVS 1.4 cm, ANGEL LUIS 52.6 ml/m2, PASP 8.1 mmHg   ? Cardiac cath 12/7/16: LAD 20% stenosis, left circumflex 30% stenosis, third marginal 35% stenosis, RCA 50% stenosis  ? DAVID 11/22/16: EF 55%, grade II/IV diastolic dysfunction  ? LTD Echo 6/16/16: EF 55%, probably mild to moderate MR, moderate TR, RVSP 60.5 mmHg, ANGEL LUIS 64.1 ml/m2, LA length 7.3 x 6.7  ? Stress test 5/19/16: Likely normal, per Dr. Savage  · Labs  ? 2/22/19: TSH 0.509, free T4 0.8, free T3 2.0  ? 1/30/19: K 4.4, creatinine 1.01  ? 1/12/19: Magnesium 2.3  ? TSH low at 0.012 with normal free T3 and free T4 on 12/19/18, thyroid antibodies within normal limits 12/26/18     Recommendations:   · Obtain magnesium level and BMP today.  · Continue sotalol for rhythm control strategy and warfarin for anticoagulation.  No medication  changes from EP standpoint.  · Follow-up in 6 months with mid-level provider.  · Has an appointment with PCP in the near future, instructed to discuss worsening dyspnea on exertion.    HPI:  This is a 69-year-old male with PMH of mild coronary artery disease, mitral valve repair in 2016, obstructive sleep apnea, and status post VATS procedure in January of this year, seen in the clinic for follow-up of persistent drug refractory atrial fibrillation and atrial flutter. He has had an ablation for atrial fibrillation in 2016 with recurrence during the blanking period. He has also had multiple direct current cardioversions. Has been on amiodarone in the past, now on sotalol 120 mg twice daily. Is anticoagulated on warfarin and denies bleeding issues or concerns. ECG today shows sinus bradycardia/sinus arrhythmia.    Overall doing well.  Denies chest pain, shortness of breath at rest, and worsening lower extremity swelling.  Continues to have intermittent palpitations, that are infrequent, and do not occur daily.  Dyspnea on exertion with walking continues, and feels that it is slightly worse as compared to 2 months ago.  No dyspnea on exertion when using stationary bike for 30 minutes.  Continues to have productive cough in a.m..  Monitors blood pressures and heart rates at home, and noted heart rates are 50-54 bpm at rest, increases into the 90's with activity.  Leaving for [place] on January 6, returning at the beginning of April.  Accompanied by wife at today's visit.    PAST MEDICAL HX:    Adenomatous polyp of colon                                    Persistent atrial fibrillation                                  Comment: 11/2012    Hepatocellular dysfunction                                    Former cigar smoker                                             Comment: quit 2007    HTN (hypertension)                                            Right bundle branch block                                     Aortic valve  sclerosis                                        Mitral valve prolapse                                           Comment: moderate insufficiency, noted 12/2012    ED (erectile dysfunction)                                     HDL deficiency                                                Migraine headache                                             Glaucoma (increased eye pressure)                             Cataract                                                      EVE on CPAP                                                   EVE (obstructive sleep apnea)                   12/2/2014     H/O mitral valve repair                         2016          Acute gout of right foot                        6/6/2017      Elevated fasting blood sugar                    6/25/2018       Comment: -- Hemoglobin A1c is stable, Marquita Obando MD, 6/25/2018.     Diverticulosis                                                CPAP (continuous positive airway pressure) dep*               Allergies and Medications were reviewed    ROS:  Pertinent items noted in history of present illness, all remaining items were reviewed and are negative.    PHYSICAL EXAM:  Visit Vitals  Pulse 54   Resp 20   Ht 6' (1.829 m)   Wt 101.9 kg   SpO2 99%   BMI 30.46 kg/m²     GENERAL:  Cooperative, sitting comfortably, in no acute distress.  HEAD: Normocephalic, Non-traumatic  NECK: Trachea midline  CARDIOVASCULAR:   Bradycardic, regular rate and rhythm, normal S1/S2, no murmur.   RESPIRATORY:  Clear to ausculation bilaterally. No wheezes, rale or rhonchi.  EXTREMITIES:  Trace edema RLE, +1 edema LLE.  NEURO: No obvious motor or sensor deficits  PSYCHIATRIC:   Alert and oriented to person, place, and time.  INTEGUMENTARY:  Warm and dry.    I have personally reviewed and interpreted EKG.  I have reviewed and summarized old records as noted in the impression section.    EKG: Sinus bradycardia/sinus arrhythmia, RBBB, JT approximately 340-360  ms    Labs:   Lab Results   Component Value Date    WBC 9.2 04/24/2019    WBC 9.8 04/03/2019    HGB 14.6 04/24/2019    HGB 13.9 04/03/2019    HCT 44.7 04/24/2019    HCT 42.4 04/03/2019     04/24/2019     04/03/2019     12/05/2012     12/15/2011    INR 2.7 11/11/2019    INR 1.6 (A) 10/31/2019    INR 2.1 09/06/2019    INR 3.2 06/17/2019    POTASSIUM 4.6 06/17/2019    POTASSIUM 5.0 04/24/2019    BUN 27 (H) 06/17/2019    BUN 23 (H) 04/24/2019    CREATININE 0.98 06/17/2019    CREATININE 0.96 04/24/2019     (H) 12/17/2016     (H) 12/16/2016    TSH 0.509 02/22/2019        175.9

## 2019-12-03 NOTE — PROGRESS NOTE ADULT - ASSESSMENT
53 y/o male w/PMH gerd, HLD, etoh abuse presents with complaint of  intoxication and generalized malaise.

## 2019-12-03 NOTE — PROCEDURE NOTE - ADDITIONAL PROCEDURE DETAILS
pt s/p midline placement inserted into right brachial vein under US guidance.  4fr x 20cm SL.  Pt tolerated procedure well  -midline can be accessed

## 2019-12-03 NOTE — PROGRESS NOTE ADULT - PROBLEM SELECTOR PROBLEM 4
Pt calling. Had a baby on 10/26. About 5 days ago her bright red bleeding started again. Changing her pad about every 3 hours. Has also passed one moderate sized clot over the past three days (about golf ball sized). Would like to know what to do. Does not have any cramping or pain.  Please advise. Also sent text page so this message can be addressed today.        Yuly Adler RN     Preventive measure

## 2019-12-04 LAB
ANION GAP SERPL CALC-SCNC: 9 MMOL/L — SIGNIFICANT CHANGE UP (ref 5–17)
BUN SERPL-MCNC: 6 MG/DL — LOW (ref 7–23)
CALCIUM SERPL-MCNC: 9.5 MG/DL — SIGNIFICANT CHANGE UP (ref 8.5–10.1)
CHLORIDE SERPL-SCNC: 106 MMOL/L — SIGNIFICANT CHANGE UP (ref 96–108)
CO2 SERPL-SCNC: 24 MMOL/L — SIGNIFICANT CHANGE UP (ref 22–31)
CREAT SERPL-MCNC: 0.78 MG/DL — SIGNIFICANT CHANGE UP (ref 0.5–1.3)
GLUCOSE SERPL-MCNC: 93 MG/DL — SIGNIFICANT CHANGE UP (ref 70–99)
MAGNESIUM SERPL-MCNC: 1.7 MG/DL — SIGNIFICANT CHANGE UP (ref 1.6–2.6)
POTASSIUM SERPL-MCNC: 3.8 MMOL/L — SIGNIFICANT CHANGE UP (ref 3.5–5.3)
POTASSIUM SERPL-SCNC: 3.8 MMOL/L — SIGNIFICANT CHANGE UP (ref 3.5–5.3)
SODIUM SERPL-SCNC: 139 MMOL/L — SIGNIFICANT CHANGE UP (ref 135–145)

## 2019-12-04 PROCEDURE — 99233 SBSQ HOSP IP/OBS HIGH 50: CPT

## 2019-12-04 RX ORDER — PHENOBARBITAL 60 MG
260 TABLET ORAL ONCE
Refills: 0 | Status: DISCONTINUED | OUTPATIENT
Start: 2019-12-04 | End: 2019-12-04

## 2019-12-04 RX ORDER — HALOPERIDOL DECANOATE 100 MG/ML
5 INJECTION INTRAMUSCULAR ONCE
Refills: 0 | Status: DISCONTINUED | OUTPATIENT
Start: 2019-12-04 | End: 2019-12-04

## 2019-12-04 RX ORDER — HALOPERIDOL DECANOATE 100 MG/ML
5 INJECTION INTRAMUSCULAR ONCE
Refills: 0 | Status: COMPLETED | OUTPATIENT
Start: 2019-12-04 | End: 2019-12-04

## 2019-12-04 RX ORDER — PHENOBARBITAL 60 MG
130 TABLET ORAL ONCE
Refills: 0 | Status: DISCONTINUED | OUTPATIENT
Start: 2019-12-04 | End: 2019-12-04

## 2019-12-04 RX ADMIN — Medication 2 MILLIGRAM(S): at 12:05

## 2019-12-04 RX ADMIN — Medication 2 MILLIGRAM(S): at 14:31

## 2019-12-04 RX ADMIN — Medication 260 MILLIGRAM(S): at 04:21

## 2019-12-04 RX ADMIN — Medication 2 MILLIGRAM(S): at 13:19

## 2019-12-04 RX ADMIN — Medication 1 TABLET(S): at 12:42

## 2019-12-04 RX ADMIN — SODIUM CHLORIDE 120 MILLILITER(S): 9 INJECTION INTRAMUSCULAR; INTRAVENOUS; SUBCUTANEOUS at 21:23

## 2019-12-04 RX ADMIN — Medication 2 MILLIGRAM(S): at 08:06

## 2019-12-04 RX ADMIN — SODIUM CHLORIDE 120 MILLILITER(S): 9 INJECTION INTRAMUSCULAR; INTRAVENOUS; SUBCUTANEOUS at 07:19

## 2019-12-04 RX ADMIN — Medication 2 MILLIGRAM(S): at 21:22

## 2019-12-04 RX ADMIN — Medication 2 MILLIGRAM(S): at 09:12

## 2019-12-04 RX ADMIN — Medication 2 MILLIGRAM(S): at 10:10

## 2019-12-04 RX ADMIN — Medication 100 MILLIGRAM(S): at 12:42

## 2019-12-04 RX ADMIN — CHLORHEXIDINE GLUCONATE 1 APPLICATION(S): 213 SOLUTION TOPICAL at 12:44

## 2019-12-04 RX ADMIN — Medication 2 MILLIGRAM(S): at 03:30

## 2019-12-04 RX ADMIN — Medication 1 MILLIGRAM(S): at 04:02

## 2019-12-04 RX ADMIN — Medication 2 MILLIGRAM(S): at 22:12

## 2019-12-04 RX ADMIN — Medication 130 MILLIGRAM(S): at 12:11

## 2019-12-04 RX ADMIN — Medication 2 MILLIGRAM(S): at 05:53

## 2019-12-04 RX ADMIN — HALOPERIDOL DECANOATE 5 MILLIGRAM(S): 100 INJECTION INTRAMUSCULAR at 22:21

## 2019-12-04 RX ADMIN — QUETIAPINE FUMARATE 50 MILLIGRAM(S): 200 TABLET, FILM COATED ORAL at 12:42

## 2019-12-04 RX ADMIN — Medication 2 MILLIGRAM(S): at 17:53

## 2019-12-04 RX ADMIN — Medication 1 MILLIGRAM(S): at 12:42

## 2019-12-04 RX ADMIN — Medication 1 MILLIGRAM(S): at 02:08

## 2019-12-04 RX ADMIN — Medication 2 MILLIGRAM(S): at 16:22

## 2019-12-04 RX ADMIN — Medication 2 MILLIGRAM(S): at 07:18

## 2019-12-04 RX ADMIN — PANTOPRAZOLE SODIUM 40 MILLIGRAM(S): 20 TABLET, DELAYED RELEASE ORAL at 06:04

## 2019-12-04 NOTE — PROGRESS NOTE ADULT - ASSESSMENT
55 y/o male w/PMH gerd, HLD, etoh abuse presents with complaint of  intoxication and generalized malaise.

## 2019-12-04 NOTE — CHART NOTE - NSCHARTNOTEFT_GEN_A_CORE
Pt well known to medicine service, now s/p 48hr from admission for Etoh intoxication/withdrawal.  CIWA score persistently elevated >10 overnight without improvement on tapering ativan dose.  Pt remains agitated, yelling, hallucinating, oob requiring 1:1.  Phenobarb 260mg x 1 given for CIWA 14 with minimal improvement.  Pt with h/o multiple ICU admissions for precedex and/or standing phenobarb dosing.  At this time, will stop ativan taper and return to ativan 2mg q4hr x 6 doses with q1hr dosing prn.  Will continue 1:1 observation for safety and consider ICU consult if no improvement on increased ativan dosing.

## 2019-12-04 NOTE — PROGRESS NOTE ADULT - SUBJECTIVE AND OBJECTIVE BOX
Patient is a 54y old  Male who presents with a chief complaint of malaise (03 Dec 2019 18:15)      INTERVAL HPI/OVERNIGHT EVENTS:  Pt was seen and examined, no acute events.     MEDICATIONS  (STANDING):  chlorhexidine 4% Liquid 1 Application(s) Topical daily  folic acid 1 milliGRAM(s) Oral daily  LORazepam   Injectable 2 milliGRAM(s) IV Push every 4 hours  multivitamin 1 Tablet(s) Oral daily  pantoprazole    Tablet 40 milliGRAM(s) Oral before breakfast  QUEtiapine 50 milliGRAM(s) Oral daily  simvastatin 20 milliGRAM(s) Oral at bedtime  sodium chloride 0.9%. 1000 milliLiter(s) (120 mL/Hr) IV Continuous <Continuous>  thiamine 100 milliGRAM(s) Oral daily    MEDICATIONS  (PRN):  LORazepam   Injectable 2 milliGRAM(s) IV Push every 1 hour PRN CIWA-Ar score 8 or greater      Allergies  No Known Allergies      Vital Signs Last 24 Hrs  T(C): 36.6 (04 Dec 2019 04:28), Max: 36.6 (03 Dec 2019 17:40)  T(F): 97.8 (04 Dec 2019 04:28), Max: 97.9 (04 Dec 2019 00:03)  HR: 87 (04 Dec 2019 12:18) (67 - 97)  BP: 111/81 (04 Dec 2019 12:18) (111/81 - 145/97)  BP(mean): --  RR: 17 (04 Dec 2019 11:04) (17 - 18)  SpO2: 100% (04 Dec 2019 12:18) (97% - 100%)      PHYSICAL EXAM:  GENERAL: NAD  HEAD:  Atraumatic  EYES: PERRLA  NERVOUS SYSTEM:  awake, confused, agitated, mild tremor  CHEST/LUNG: Clear  HEART: RRR  ABDOMEN: Soft, non tender  EXTREMITIES:  no edema      LABS:                        11.8   2.91  )-----------( 181      ( 03 Dec 2019 07:46 )             36.5     12-04    139  |  106  |  6<L>  ----------------------------<  93  3.8   |  24  |  0.78    Ca    9.5      04 Dec 2019 06:20  Phos  2.8     12-03  Mg     1.7     12-04    TPro  6.7  /  Alb  3.2<L>  /  TBili  1.1  /  DBili  x   /  AST  58<H>  /  ALT  63  /  AlkPhos  42  12-03    PT/INR - ( 03 Dec 2019 11:50 )   PT: 11.2 sec;   INR: 1.00 ratio         PTT - ( 03 Dec 2019 11:50 )  PTT:23.0 sec    CAPILLARY BLOOD GLUCOSE          RADIOLOGY & ADDITIONAL TESTS:    Imaging Personally Reviewed:  [ ] YES  [ ] NO    Consultant(s) Notes Reviewed:  [ ] YES  [ ] NO    Care Discussed with Consultants/Other Providers [ ] YES  [ ] NO

## 2019-12-04 NOTE — PROGRESS NOTE ADULT - PROBLEM SELECTOR PLAN 1
Has been receiving Q hr Ativan   CIWA 14 this am.  ICU consult requested, recommended phenobarb X 3 dose and reassess.   Multivitamin, folate, thiamine  Pt doesn't not have capacity to leave AMA today.

## 2019-12-05 LAB
ALBUMIN SERPL ELPH-MCNC: 3.4 G/DL — SIGNIFICANT CHANGE UP (ref 3.3–5)
ALP SERPL-CCNC: 48 U/L — SIGNIFICANT CHANGE UP (ref 40–120)
ALT FLD-CCNC: 89 U/L — HIGH (ref 12–78)
ANION GAP SERPL CALC-SCNC: 8 MMOL/L — SIGNIFICANT CHANGE UP (ref 5–17)
AST SERPL-CCNC: 89 U/L — HIGH (ref 15–37)
BILIRUB SERPL-MCNC: 0.7 MG/DL — SIGNIFICANT CHANGE UP (ref 0.2–1.2)
BUN SERPL-MCNC: 6 MG/DL — LOW (ref 7–23)
CALCIUM SERPL-MCNC: 9.1 MG/DL — SIGNIFICANT CHANGE UP (ref 8.5–10.1)
CHLORIDE SERPL-SCNC: 109 MMOL/L — HIGH (ref 96–108)
CO2 SERPL-SCNC: 22 MMOL/L — SIGNIFICANT CHANGE UP (ref 22–31)
CREAT SERPL-MCNC: 0.72 MG/DL — SIGNIFICANT CHANGE UP (ref 0.5–1.3)
GLUCOSE SERPL-MCNC: 76 MG/DL — SIGNIFICANT CHANGE UP (ref 70–99)
HCT VFR BLD CALC: 42 % — SIGNIFICANT CHANGE UP (ref 39–50)
HGB BLD-MCNC: 13.2 G/DL — SIGNIFICANT CHANGE UP (ref 13–17)
MCHC RBC-ENTMCNC: 28.1 PG — SIGNIFICANT CHANGE UP (ref 27–34)
MCHC RBC-ENTMCNC: 31.4 GM/DL — LOW (ref 32–36)
MCV RBC AUTO: 89.6 FL — SIGNIFICANT CHANGE UP (ref 80–100)
NRBC # BLD: 0 /100 WBCS — SIGNIFICANT CHANGE UP (ref 0–0)
PLATELET # BLD AUTO: 180 K/UL — SIGNIFICANT CHANGE UP (ref 150–400)
POTASSIUM SERPL-MCNC: 3.7 MMOL/L — SIGNIFICANT CHANGE UP (ref 3.5–5.3)
POTASSIUM SERPL-SCNC: 3.7 MMOL/L — SIGNIFICANT CHANGE UP (ref 3.5–5.3)
PROT SERPL-MCNC: 7.2 GM/DL — SIGNIFICANT CHANGE UP (ref 6–8.3)
RBC # BLD: 4.69 M/UL — SIGNIFICANT CHANGE UP (ref 4.2–5.8)
RBC # FLD: 14.1 % — SIGNIFICANT CHANGE UP (ref 10.3–14.5)
SODIUM SERPL-SCNC: 139 MMOL/L — SIGNIFICANT CHANGE UP (ref 135–145)
WBC # BLD: 4.96 K/UL — SIGNIFICANT CHANGE UP (ref 3.8–10.5)
WBC # FLD AUTO: 4.96 K/UL — SIGNIFICANT CHANGE UP (ref 3.8–10.5)

## 2019-12-05 PROCEDURE — 99233 SBSQ HOSP IP/OBS HIGH 50: CPT

## 2019-12-05 RX ADMIN — Medication 2 MILLIGRAM(S): at 03:00

## 2019-12-05 RX ADMIN — Medication 1 TABLET(S): at 11:13

## 2019-12-05 RX ADMIN — CHLORHEXIDINE GLUCONATE 1 APPLICATION(S): 213 SOLUTION TOPICAL at 11:12

## 2019-12-05 RX ADMIN — SODIUM CHLORIDE 120 MILLILITER(S): 9 INJECTION INTRAMUSCULAR; INTRAVENOUS; SUBCUTANEOUS at 13:08

## 2019-12-05 RX ADMIN — Medication 100 MILLIGRAM(S): at 11:13

## 2019-12-05 RX ADMIN — QUETIAPINE FUMARATE 50 MILLIGRAM(S): 200 TABLET, FILM COATED ORAL at 11:13

## 2019-12-05 RX ADMIN — Medication 2 MILLIGRAM(S): at 10:47

## 2019-12-05 RX ADMIN — Medication 2 MILLIGRAM(S): at 00:24

## 2019-12-05 RX ADMIN — SODIUM CHLORIDE 120 MILLILITER(S): 9 INJECTION INTRAMUSCULAR; INTRAVENOUS; SUBCUTANEOUS at 21:30

## 2019-12-05 RX ADMIN — SIMVASTATIN 20 MILLIGRAM(S): 20 TABLET, FILM COATED ORAL at 21:30

## 2019-12-05 RX ADMIN — Medication 2 MILLIGRAM(S): at 21:28

## 2019-12-05 RX ADMIN — Medication 1 MILLIGRAM(S): at 11:13

## 2019-12-05 NOTE — PROGRESS NOTE ADULT - SUBJECTIVE AND OBJECTIVE BOX
Patient is a 54y old  Male who presents with a chief complaint of malaise (04 Dec 2019 14:29)      INTERVAL HPI/OVERNIGHT EVENTS:  Pt was seen and examined, no acute events.      MEDICATIONS  (STANDING):  chlorhexidine 4% Liquid 1 Application(s) Topical daily  folic acid 1 milliGRAM(s) Oral daily  multivitamin 1 Tablet(s) Oral daily  pantoprazole    Tablet 40 milliGRAM(s) Oral before breakfast  QUEtiapine 50 milliGRAM(s) Oral daily  simvastatin 20 milliGRAM(s) Oral at bedtime  sodium chloride 0.9%. 1000 milliLiter(s) (120 mL/Hr) IV Continuous <Continuous>  thiamine 100 milliGRAM(s) Oral daily    MEDICATIONS  (PRN):  LORazepam   Injectable 2 milliGRAM(s) IV Push every 1 hour PRN CIWA-Ar score 8 or greater      Allergies  No Known Allergies        Vital Signs Last 24 Hrs  T(C): 36.2 (05 Dec 2019 10:44), Max: 36.2 (04 Dec 2019 16:35)  T(F): 97.1 (05 Dec 2019 10:44), Max: 97.2 (04 Dec 2019 16:35)  HR: 97 (05 Dec 2019 10:59) (57 - 97)  BP: 131/75 (05 Dec 2019 10:59) (109/72 - 142/95)  BP(mean): --  RR: 16 (05 Dec 2019 10:59) (16 - 18)  SpO2: 99% (05 Dec 2019 10:59) (96% - 100%)      PHYSICAL EXAM:  GENERAL: NAD  HEAD:  Atraumatic  EYES: PERRLA  NERVOUS SYSTEM:  A, O x 3 today  CHEST/LUNG: Clear  HEART: RRR  ABDOMEN: Soft, non tender  EXTREMITIES:  no edema      LABS:                        13.2   4.96  )-----------( 180      ( 05 Dec 2019 08:11 )             42.0     12-05    139  |  109<H>  |  6<L>  ----------------------------<  76  3.7   |  22  |  0.72    Ca    9.1      05 Dec 2019 08:11  Mg     1.7     12-04    TPro  7.2  /  Alb  3.4  /  TBili  0.7  /  DBili  x   /  AST  89<H>  /  ALT  89<H>  /  AlkPhos  48  12-05        CAPILLARY BLOOD GLUCOSE          RADIOLOGY & ADDITIONAL TESTS:    Imaging Personally Reviewed:  [ ] YES  [ ] NO    Consultant(s) Notes Reviewed:  [ ] YES  [ ] NO    Care Discussed with Consultants/Other Providers [ ] YES  [ ] NO

## 2019-12-06 ENCOUNTER — TRANSCRIPTION ENCOUNTER (OUTPATIENT)
Age: 54
End: 2019-12-06

## 2019-12-06 VITALS
OXYGEN SATURATION: 100 % | DIASTOLIC BLOOD PRESSURE: 85 MMHG | HEART RATE: 71 BPM | SYSTOLIC BLOOD PRESSURE: 130 MMHG | TEMPERATURE: 97 F | RESPIRATION RATE: 16 BRPM

## 2019-12-06 PROCEDURE — 99233 SBSQ HOSP IP/OBS HIGH 50: CPT

## 2019-12-06 RX ORDER — PANTOPRAZOLE SODIUM 20 MG/1
1 TABLET, DELAYED RELEASE ORAL
Qty: 30 | Refills: 0
Start: 2019-12-06 | End: 2020-01-04

## 2019-12-06 RX ORDER — FOLIC ACID 0.8 MG
1 TABLET ORAL
Qty: 30 | Refills: 0
Start: 2019-12-06 | End: 2020-01-04

## 2019-12-06 RX ORDER — SIMVASTATIN 20 MG/1
1 TABLET, FILM COATED ORAL
Qty: 30 | Refills: 0
Start: 2019-12-06 | End: 2020-01-04

## 2019-12-06 RX ORDER — THIAMINE MONONITRATE (VIT B1) 100 MG
1 TABLET ORAL
Qty: 30 | Refills: 0
Start: 2019-12-06 | End: 2020-01-04

## 2019-12-06 RX ORDER — QUETIAPINE FUMARATE 200 MG/1
1 TABLET, FILM COATED ORAL
Qty: 30 | Refills: 0
Start: 2019-12-06 | End: 2020-01-04

## 2019-12-06 RX ORDER — SUCRALFATE 1 G
10 TABLET ORAL
Qty: 1000 | Refills: 0
Start: 2019-12-06 | End: 2020-01-04

## 2019-12-06 RX ADMIN — QUETIAPINE FUMARATE 50 MILLIGRAM(S): 200 TABLET, FILM COATED ORAL at 11:23

## 2019-12-06 RX ADMIN — Medication 2 MILLIGRAM(S): at 05:58

## 2019-12-06 RX ADMIN — Medication 100 MILLIGRAM(S): at 11:23

## 2019-12-06 RX ADMIN — Medication 1 MILLIGRAM(S): at 11:23

## 2019-12-06 RX ADMIN — PANTOPRAZOLE SODIUM 40 MILLIGRAM(S): 20 TABLET, DELAYED RELEASE ORAL at 06:02

## 2019-12-06 RX ADMIN — Medication 1 TABLET(S): at 11:23

## 2019-12-06 NOTE — DISCHARGE NOTE PROVIDER - HOSPITAL COURSE
53 y/o male w/PMH gerd, HLD, etoh abuse presents with complaint of  intoxication and generalized malaise.    Problem/Plan - 1:    ·  Problem: Alcohol withdrawal syndrome without complication.  Plan: Stable today    A, O x 3 today    remains stable dc home.     Problem/Plan - 2:    ·  Problem: Acute alcoholic gastritis without hemorrhage.  Plan: PPI    Carafate.     Problem/Plan - 3:    ·  Problem: Mixed hyperlipidemia.  Plan: cont statin.     Problem/Plan - 4:    ·  Problem: Preventive measure.  Plan: ambulatory.

## 2019-12-06 NOTE — DISCHARGE NOTE NURSING/CASE MANAGEMENT/SOCIAL WORK - PATIENT PORTAL LINK FT
You can access the FollowMyHealth Patient Portal offered by [facility] by registering at the following website: [email or web address]. By joining [facility]’s FollowMyHealth portal, you will also be able to view your health information using other applications (apps) compatible with our system.

## 2019-12-06 NOTE — DISCHARGE NOTE PROVIDER - NSDCCPCAREPLAN_GEN_ALL_CORE_FT
PRINCIPAL DISCHARGE DIAGNOSIS  Diagnosis: Alcohol withdrawal  Assessment and Plan of Treatment: stable, continue suplemental vitamins and abstain from etoh intake. follow up with PMD.      SECONDARY DISCHARGE DIAGNOSES  Diagnosis: Acute alcoholic gastritis without hemorrhage  Assessment and Plan of Treatment: STABLE NOW. CONTINUE PPI    Diagnosis: Mixed hyperlipidemia  Assessment and Plan of Treatment: CONTINUE STATINS

## 2019-12-10 DIAGNOSIS — R53.83 OTHER FATIGUE: ICD-10-CM

## 2019-12-10 DIAGNOSIS — K27.9 PEPTIC ULCER, SITE UNSPECIFIED, UNSPECIFIED AS ACUTE OR CHRONIC, WITHOUT HEMORRHAGE OR PERFORATION: ICD-10-CM

## 2019-12-10 DIAGNOSIS — E78.2 MIXED HYPERLIPIDEMIA: ICD-10-CM

## 2019-12-10 DIAGNOSIS — R44.3 HALLUCINATIONS, UNSPECIFIED: ICD-10-CM

## 2019-12-10 DIAGNOSIS — K29.20 ALCOHOLIC GASTRITIS WITHOUT BLEEDING: ICD-10-CM

## 2019-12-10 DIAGNOSIS — F10.239 ALCOHOL DEPENDENCE WITH WITHDRAWAL, UNSPECIFIED: ICD-10-CM

## 2019-12-10 DIAGNOSIS — F10.229 ALCOHOL DEPENDENCE WITH INTOXICATION, UNSPECIFIED: ICD-10-CM
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
## 2019-12-10 DIAGNOSIS — K21.9 GASTRO-ESOPHAGEAL REFLUX DISEASE WITHOUT ESOPHAGITIS: ICD-10-CM

## 2019-12-10 NOTE — ED ADULT NURSE NOTE - NSIMPLEMENTINTERV_GEN_ALL_ED
12/9/2019 Dimension 3, 4, 5 and 6  Group Chart Note - Co-facilitated with SHERMAN Hirsch.  Number of clients attending the group:  4      Soraya Linares  attended 1 hour Psychoeducation group covering the following topics Distress tolerance, Emotion Regulation and Relapse Prevention.  Client was Distracted.  Client's response: client watched at [employer] talk about the concept and treatment of addiction and contributed to a group discussion afterwards.    Implemented All Fall with Harm Risk Interventions:  [place] to call system. Call bell, personal items and telephone within reach. Instruct patient to call for assistance. Room bathroom lighting operational. Non-slip footwear when patient is off stretcher. Physically safe environment: no spills, clutter or unnecessary equipment. Stretcher in lowest position, wheels locked, appropriate side rails in place. Provide visual cue, wrist band, yellow gown, etc. Monitor gait and stability. Monitor for mental status changes and reorient to person, place, and time. Review medications for side effects contributing to fall risk. Reinforce activity limits and safety measures with patient and family. Provide visual clues: red socks.

## 2019-12-13 ENCOUNTER — INPATIENT (INPATIENT)
Facility: HOSPITAL | Age: 54
LOS: 1 days | Discharge: HOME | End: 2019-12-15
Attending: HOSPITALIST | Admitting: HOSPITALIST
Payer: MEDICAID

## 2019-12-13 VITALS
HEART RATE: 92 BPM | DIASTOLIC BLOOD PRESSURE: 122 MMHG | HEIGHT: 66 IN | SYSTOLIC BLOOD PRESSURE: 147 MMHG | TEMPERATURE: 98 F | WEIGHT: 186.95 LBS | RESPIRATION RATE: 18 BRPM | OXYGEN SATURATION: 97 %

## 2019-12-13 LAB
ALBUMIN SERPL ELPH-MCNC: 3.8 G/DL — SIGNIFICANT CHANGE UP (ref 3.3–5)
ALP SERPL-CCNC: 42 U/L — SIGNIFICANT CHANGE UP (ref 40–120)
ALT FLD-CCNC: 59 U/L — SIGNIFICANT CHANGE UP (ref 12–78)
AMYLASE P1 CFR SERPL: 108 U/L — SIGNIFICANT CHANGE UP (ref 25–115)
ANION GAP SERPL CALC-SCNC: 11 MMOL/L — SIGNIFICANT CHANGE UP (ref 5–17)
APTT BLD: 27.7 SEC — SIGNIFICANT CHANGE UP (ref 27.5–36.3)
AST SERPL-CCNC: 51 U/L — HIGH (ref 15–37)
BASOPHILS # BLD AUTO: 0.08 K/UL — SIGNIFICANT CHANGE UP (ref 0–0.2)
BASOPHILS NFR BLD AUTO: 2 % — SIGNIFICANT CHANGE UP (ref 0–2)
BILIRUB SERPL-MCNC: 0.2 MG/DL — SIGNIFICANT CHANGE UP (ref 0.2–1.2)
BUN SERPL-MCNC: 11 MG/DL — SIGNIFICANT CHANGE UP (ref 7–23)
CALCIUM SERPL-MCNC: 9 MG/DL — SIGNIFICANT CHANGE UP (ref 8.5–10.1)
CHLORIDE SERPL-SCNC: 104 MMOL/L — SIGNIFICANT CHANGE UP (ref 96–108)
CK MB BLD-MCNC: <0.6 % — SIGNIFICANT CHANGE UP (ref 0–3.5)
CK MB CFR SERPL CALC: <1 NG/ML — SIGNIFICANT CHANGE UP (ref 0.5–3.6)
CK SERPL-CCNC: 177 U/L — SIGNIFICANT CHANGE UP (ref 26–308)
CO2 SERPL-SCNC: 26 MMOL/L — SIGNIFICANT CHANGE UP (ref 22–31)
CREAT SERPL-MCNC: 0.8 MG/DL — SIGNIFICANT CHANGE UP (ref 0.5–1.3)
EOSINOPHIL # BLD AUTO: 0.16 K/UL — SIGNIFICANT CHANGE UP (ref 0–0.5)
EOSINOPHIL NFR BLD AUTO: 4 % — SIGNIFICANT CHANGE UP (ref 0–6)
ETHANOL SERPL-MCNC: 120 MG/DL — HIGH (ref 0–10)
GLUCOSE BLDC GLUCOMTR-MCNC: 94 MG/DL — SIGNIFICANT CHANGE UP (ref 70–99)
GLUCOSE SERPL-MCNC: 82 MG/DL — SIGNIFICANT CHANGE UP (ref 70–99)
HCT VFR BLD CALC: 40.9 % — SIGNIFICANT CHANGE UP (ref 39–50)
HGB BLD-MCNC: 13.2 G/DL — SIGNIFICANT CHANGE UP (ref 13–17)
IMM GRANULOCYTES NFR BLD AUTO: 0.3 % — SIGNIFICANT CHANGE UP (ref 0–1.5)
INR BLD: 1 RATIO — SIGNIFICANT CHANGE UP (ref 0.88–1.16)
LIDOCAIN IGE QN: 323 U/L — SIGNIFICANT CHANGE UP (ref 73–393)
LYMPHOCYTES # BLD AUTO: 1.73 K/UL — SIGNIFICANT CHANGE UP (ref 1–3.3)
LYMPHOCYTES # BLD AUTO: 43.4 % — SIGNIFICANT CHANGE UP (ref 13–44)
MAGNESIUM SERPL-MCNC: 1.7 MG/DL — SIGNIFICANT CHANGE UP (ref 1.6–2.6)
MCHC RBC-ENTMCNC: 28.8 PG — SIGNIFICANT CHANGE UP (ref 27–34)
MCHC RBC-ENTMCNC: 32.3 GM/DL — SIGNIFICANT CHANGE UP (ref 32–36)
MCV RBC AUTO: 89.3 FL — SIGNIFICANT CHANGE UP (ref 80–100)
MONOCYTES # BLD AUTO: 0.5 K/UL — SIGNIFICANT CHANGE UP (ref 0–0.9)
MONOCYTES NFR BLD AUTO: 12.5 % — SIGNIFICANT CHANGE UP (ref 2–14)
NEUTROPHILS # BLD AUTO: 1.51 K/UL — LOW (ref 1.8–7.4)
NEUTROPHILS NFR BLD AUTO: 37.8 % — LOW (ref 43–77)
NRBC # BLD: 0 /100 WBCS — SIGNIFICANT CHANGE UP (ref 0–0)
PLATELET # BLD AUTO: 289 K/UL — SIGNIFICANT CHANGE UP (ref 150–400)
POTASSIUM SERPL-MCNC: 3.8 MMOL/L — SIGNIFICANT CHANGE UP (ref 3.5–5.3)
POTASSIUM SERPL-SCNC: 3.8 MMOL/L — SIGNIFICANT CHANGE UP (ref 3.5–5.3)
PROT SERPL-MCNC: 7.7 GM/DL — SIGNIFICANT CHANGE UP (ref 6–8.3)
PROTHROM AB SERPL-ACNC: 11.2 SEC — SIGNIFICANT CHANGE UP (ref 10–12.9)
RBC # BLD: 4.58 M/UL — SIGNIFICANT CHANGE UP (ref 4.2–5.8)
RBC # FLD: 14.4 % — SIGNIFICANT CHANGE UP (ref 10.3–14.5)
SODIUM SERPL-SCNC: 141 MMOL/L — SIGNIFICANT CHANGE UP (ref 135–145)
TROPONIN I SERPL-MCNC: <.015 NG/ML — SIGNIFICANT CHANGE UP (ref 0.01–0.04)
WBC # BLD: 3.99 K/UL — SIGNIFICANT CHANGE UP (ref 3.8–10.5)
WBC # FLD AUTO: 3.99 K/UL — SIGNIFICANT CHANGE UP (ref 3.8–10.5)

## 2019-12-13 PROCEDURE — 93010 ELECTROCARDIOGRAM REPORT: CPT

## 2019-12-13 PROCEDURE — 70450 CT HEAD/BRAIN W/O DYE: CPT | Mod: 26

## 2019-12-13 PROCEDURE — 99285 EMERGENCY DEPT VISIT HI MDM: CPT

## 2019-12-13 PROCEDURE — 71045 X-RAY EXAM CHEST 1 VIEW: CPT | Mod: 26

## 2019-12-13 PROCEDURE — 99223 1ST HOSP IP/OBS HIGH 75: CPT

## 2019-12-13 PROCEDURE — 74177 CT ABD & PELVIS W/CONTRAST: CPT | Mod: 26

## 2019-12-13 RX ORDER — QUETIAPINE FUMARATE 200 MG/1
50 TABLET, FILM COATED ORAL DAILY
Refills: 0 | Status: DISCONTINUED | OUTPATIENT
Start: 2019-12-13 | End: 2019-12-15

## 2019-12-13 RX ORDER — ONDANSETRON 8 MG/1
8 TABLET, FILM COATED ORAL ONCE
Refills: 0 | Status: COMPLETED | OUTPATIENT
Start: 2019-12-13 | End: 2019-12-13

## 2019-12-13 RX ORDER — PANTOPRAZOLE SODIUM 20 MG/1
40 TABLET, DELAYED RELEASE ORAL
Refills: 0 | Status: DISCONTINUED | OUTPATIENT
Start: 2019-12-13 | End: 2019-12-14

## 2019-12-13 RX ORDER — SODIUM CHLORIDE 9 MG/ML
1000 INJECTION, SOLUTION INTRAVENOUS
Refills: 0 | Status: COMPLETED | OUTPATIENT
Start: 2019-12-13 | End: 2019-12-13

## 2019-12-13 RX ORDER — MAGNESIUM SULFATE 500 MG/ML
2 VIAL (ML) INJECTION ONCE
Refills: 0 | Status: COMPLETED | OUTPATIENT
Start: 2019-12-13 | End: 2019-12-13

## 2019-12-13 RX ORDER — THIAMINE MONONITRATE (VIT B1) 100 MG
100 TABLET ORAL DAILY
Refills: 0 | Status: DISCONTINUED | OUTPATIENT
Start: 2019-12-13 | End: 2019-12-15

## 2019-12-13 RX ORDER — FOLIC ACID 0.8 MG
1 TABLET ORAL DAILY
Refills: 0 | Status: DISCONTINUED | OUTPATIENT
Start: 2019-12-13 | End: 2019-12-15

## 2019-12-13 RX ORDER — PANTOPRAZOLE SODIUM 20 MG/1
40 TABLET, DELAYED RELEASE ORAL ONCE
Refills: 0 | Status: COMPLETED | OUTPATIENT
Start: 2019-12-13 | End: 2019-12-13

## 2019-12-13 RX ORDER — SIMVASTATIN 20 MG/1
20 TABLET, FILM COATED ORAL AT BEDTIME
Refills: 0 | Status: DISCONTINUED | OUTPATIENT
Start: 2019-12-13 | End: 2019-12-15

## 2019-12-13 RX ORDER — SUCRALFATE 1 G
1 TABLET ORAL
Refills: 0 | Status: DISCONTINUED | OUTPATIENT
Start: 2019-12-13 | End: 2019-12-15

## 2019-12-13 RX ORDER — SODIUM CHLORIDE 9 MG/ML
2000 INJECTION INTRAMUSCULAR; INTRAVENOUS; SUBCUTANEOUS ONCE
Refills: 0 | Status: COMPLETED | OUTPATIENT
Start: 2019-12-13 | End: 2019-12-13

## 2019-12-13 RX ADMIN — ONDANSETRON 8 MILLIGRAM(S): 8 TABLET, FILM COATED ORAL at 08:11

## 2019-12-13 RX ADMIN — PANTOPRAZOLE SODIUM 40 MILLIGRAM(S): 20 TABLET, DELAYED RELEASE ORAL at 12:46

## 2019-12-13 RX ADMIN — Medication 50 GRAM(S): at 10:25

## 2019-12-13 RX ADMIN — SODIUM CHLORIDE 2000 MILLILITER(S): 9 INJECTION INTRAMUSCULAR; INTRAVENOUS; SUBCUTANEOUS at 08:07

## 2019-12-13 RX ADMIN — Medication 1 MILLIGRAM(S): at 12:46

## 2019-12-13 RX ADMIN — Medication 2 MILLIGRAM(S): at 20:25

## 2019-12-13 RX ADMIN — Medication 2 MILLIGRAM(S): at 08:10

## 2019-12-13 RX ADMIN — Medication 100 MILLIGRAM(S): at 18:14

## 2019-12-13 RX ADMIN — SODIUM CHLORIDE 125 MILLILITER(S): 9 INJECTION, SOLUTION INTRAVENOUS at 10:12

## 2019-12-13 RX ADMIN — Medication 25 MILLIGRAM(S): at 18:14

## 2019-12-13 RX ADMIN — SODIUM CHLORIDE 2000 MILLILITER(S): 9 INJECTION INTRAMUSCULAR; INTRAVENOUS; SUBCUTANEOUS at 10:23

## 2019-12-13 RX ADMIN — PANTOPRAZOLE SODIUM 40 MILLIGRAM(S): 20 TABLET, DELAYED RELEASE ORAL at 08:11

## 2019-12-13 RX ADMIN — Medication 1 TABLET(S): at 12:46

## 2019-12-13 RX ADMIN — Medication 1 GRAM(S): at 18:15

## 2019-12-13 RX ADMIN — Medication 1 GRAM(S): at 23:00

## 2019-12-13 RX ADMIN — Medication 30 MILLILITER(S): at 08:10

## 2019-12-13 RX ADMIN — Medication 2 MILLIGRAM(S): at 12:46

## 2019-12-13 RX ADMIN — QUETIAPINE FUMARATE 50 MILLIGRAM(S): 200 TABLET, FILM COATED ORAL at 18:15

## 2019-12-13 RX ADMIN — Medication 25 MILLIGRAM(S): at 23:00

## 2019-12-13 NOTE — H&P ADULT - NSHPPHYSICALEXAM_GEN_ALL_CORE
GENERAL: NAD, well-groomed, well-developed  HEAD:  Atraumatic, Normocephalic  EYES: EOMI, PERRLA, conjunctiva and sclera clear  ENMT: No tonsillar erythema, exudates, or enlargement; Moist mucous membranes, Good dentition, No lesions  NECK: Supple, No JVD, Normal thyroid  NERVOUS SYSTEM:  Alert & Oriented X3, poor concentration tremor   CHEST/LUNG: Clear to percussion bilaterally; No rales, rhonchi, wheezing, or rubs  HEART: Regular rate and rhythm; No murmurs, rubs, or gallops  ABDOMEN: Soft, Nontender, Nondistended; Bowel sounds present  EXTREMITIES:  2+ Peripheral Pulses, No clubbing, cyanosis, or edema  LYMPH: No lymphadenopathy noted  SKIN: No rashes or lesions

## 2019-12-13 NOTE — ED ADULT TRIAGE NOTE - CHIEF COMPLAINT QUOTE
PW with intoxication, RUE tremors, ABD pain , N/V . headache , chest pain w/o SOB x 2 days. Pt stated  he drank couple of cups of vodka l@ 1800 yesterday.

## 2019-12-13 NOTE — H&P ADULT - NSHPREVIEWOFSYSTEMS_GEN_ALL_CORE
CONSTITUTIONAL: No fever, weight loss, or fatigue  EYES: No eye pain, visual disturbances, or discharge  ENMT:  No difficulty hearing, tinnitus, vertigo; No sinus or throat pain  NECK: No pain or stiffness  BREASTS: No pain, masses, or nipple discharge  RESPIRATORY: No cough, wheezing, chills or hemoptysis; No shortness of breath  CARDIOVASCULAR: chest pain   GASTROINTESTINAL: epigastric pain   GENITOURINARY: No dysuria, frequency, hematuria, or incontinence  NEUROLOGICAL: No headaches, memory loss, loss of strength, numbness, or tremors  SKIN: No itching, burning, rashes, or lesions   LYMPH NODES: No enlarged glands  ENDOCRINE: No heat or cold intolerance; No hair loss  MUSCULOSKELETAL: No joint pain or swelling; No muscle, back, or extremity pain  PSYCHIATRIC: tremor of ETOH   HEME/LYMPH: No easy bruising, or bleeding gums  ALLERGY AND IMMUNOLOGIC: No hives or eczema

## 2019-12-13 NOTE — H&P ADULT - ASSESSMENT
53 y/o male w/PMH gerd, HLD, etoh abuse presents with complaint of  intoxication and generalized malaise

## 2019-12-13 NOTE — ED ADULT TRIAGE NOTE - HEIGHT IN CM
Wound team in to see pt.  Pt rolled to right side with A of RN.  Pt cleaned of a small amount of stool.  Sacrococcygeal area discolored hemosiderin likely from either sitting on the toilet for stretches of time or sitting on a doughnut style cushion.  No pressure injury present.  Preventative measures in place.  Pt with callus on left heel and right plantar arch.  Heel callus pared down to remove pressure and decrease chances of developing a pressure sore.  Pt states that she does walk when she is in her normal state of health.  Recommend barrier paste for perineal area and moisturizing lotion for feet.  Pulses by hand held doppler found.  right DP 1+ biphasic and left DP and bilateral TP 2+ and biphasic.   No further wound care needs at this time.  Wound Team signing off.     167.64

## 2019-12-13 NOTE — ED PROVIDER NOTE - OBJECTIVE STATEMENT
54 years old male here c/o headache, epigastric abd pain, chest pain nausea vomiting since two to three days ago Pt admits alcohol drinking daily last drink was at 18:00 pm yesterday. Pt denies, dizziness, blurred visions, light sensitivities, focal/distal weakness or numbness, cough, sob, chest pain, fever, chills, dysuria or irregular bowel movements. 54 years old male here c/o headache, epigastric abd pain, chest pain nausea vomiting since two to three days ago Pt admits alcohol drinking daily last drink was at 18:00 pm yesterday. Pt denies, dizziness, blurred visions, light sensitivities, focal/distal weakness or numbness, cough, sob, chest pain, fever, chills, dysuria or irregular bowel movements. CIWA score is 12.

## 2019-12-13 NOTE — H&P ADULT - HISTORY OF PRESENT ILLNESS
54 years old male here c/o headache, epigastric abd pain, chest pain nausea vomiting since two to three days ago Pt admits alcohol drinking daily last drink was at 18:00 pm yesterday. Pt denies, dizziness, blurred visions, light sensitivities, focal/distal weakness or numbness, cough, sob, chest pain, fever, chills, dysuria or irregular bowel movements. CIWA score is 12.

## 2019-12-13 NOTE — ED PROVIDER NOTE - CONSTITUTIONAL, MLM
normal... Well appearing, awake, alert, oriented to person, place, time/situation and in no apparent distress. Speaking in clear full sentences no nasal flaring no shoulders retractions no diaphoresis, not holding his head/chest/abdomen

## 2019-12-13 NOTE — ED ADULT NURSE REASSESSMENT NOTE - NS ED NURSE REASSESS COMMENT FT1
Pt's have improvement in tremors, no seizure like activity noted in ED at this time. Pt admitted awaiting bed assignment, safety maintained. Pt resting comfortably in no apparent respiratory distress. Report given to hold RN Ivett

## 2019-12-13 NOTE — H&P ADULT - NSHPLABSRESULTS_GEN_ALL_CORE
13.2   3.99  )-----------( 289      ( 13 Dec 2019 07:59 )             40.9     12-13    141  |  104  |  11  ----------------------------<  82  3.8   |  26  |  0.80    Ca    9.0      13 Dec 2019 07:59  Mg     1.7     12-13    TPro  7.7  /  Alb  3.8  /  TBili  0.2  /  DBili  x   /  AST  51<H>  /  ALT  59  /  AlkPhos  42  12-13    PT/INR - ( 13 Dec 2019 07:59 )   PT: 11.2 sec;   INR: 1.00 ratio         PTT - ( 13 Dec 2019 07:59 )  PTT:27.7 sec

## 2019-12-13 NOTE — ED ADULT NURSE NOTE - NSIMPLEMENTINTERV_GEN_ALL_ED
Implemented All Fall Risk Interventions:  [place] to call system. Call bell, personal items and telephone within reach. Instruct patient to call for assistance. Room bathroom lighting operational. Non-slip footwear when patient is off stretcher. Physically safe environment: no spills, clutter or unnecessary equipment. Stretcher in lowest position, wheels locked, appropriate side rails in place. Provide visual cue, wrist band, yellow gown, etc. Monitor gait and stability. Monitor for mental status changes and reorient to person, place, and time. Review medications for side effects contributing to fall risk. Reinforce activity limits and safety measures with patient and family.

## 2019-12-14 LAB
ANION GAP SERPL CALC-SCNC: 4 MMOL/L — LOW (ref 5–17)
BUN SERPL-MCNC: 7 MG/DL — SIGNIFICANT CHANGE UP (ref 7–23)
CALCIUM SERPL-MCNC: 8.4 MG/DL — LOW (ref 8.5–10.1)
CHLORIDE SERPL-SCNC: 107 MMOL/L — SIGNIFICANT CHANGE UP (ref 96–108)
CO2 SERPL-SCNC: 28 MMOL/L — SIGNIFICANT CHANGE UP (ref 22–31)
CREAT SERPL-MCNC: 0.79 MG/DL — SIGNIFICANT CHANGE UP (ref 0.5–1.3)
GLUCOSE SERPL-MCNC: 85 MG/DL — SIGNIFICANT CHANGE UP (ref 70–99)
HCT VFR BLD CALC: 35.9 % — LOW (ref 39–50)
HGB BLD-MCNC: 11.5 G/DL — LOW (ref 13–17)
MAGNESIUM SERPL-MCNC: 1.9 MG/DL — SIGNIFICANT CHANGE UP (ref 1.6–2.6)
MCHC RBC-ENTMCNC: 28.5 PG — SIGNIFICANT CHANGE UP (ref 27–34)
MCHC RBC-ENTMCNC: 32 GM/DL — SIGNIFICANT CHANGE UP (ref 32–36)
MCV RBC AUTO: 89.1 FL — SIGNIFICANT CHANGE UP (ref 80–100)
NRBC # BLD: 0 /100 WBCS — SIGNIFICANT CHANGE UP (ref 0–0)
PHOSPHATE SERPL-MCNC: 2.8 MG/DL — SIGNIFICANT CHANGE UP (ref 2.5–4.5)
PLATELET # BLD AUTO: 268 K/UL — SIGNIFICANT CHANGE UP (ref 150–400)
POTASSIUM SERPL-MCNC: 3.7 MMOL/L — SIGNIFICANT CHANGE UP (ref 3.5–5.3)
POTASSIUM SERPL-SCNC: 3.7 MMOL/L — SIGNIFICANT CHANGE UP (ref 3.5–5.3)
RBC # BLD: 4.03 M/UL — LOW (ref 4.2–5.8)
RBC # FLD: 13.8 % — SIGNIFICANT CHANGE UP (ref 10.3–14.5)
SODIUM SERPL-SCNC: 139 MMOL/L — SIGNIFICANT CHANGE UP (ref 135–145)
WBC # BLD: 3.22 K/UL — LOW (ref 3.8–10.5)
WBC # FLD AUTO: 3.22 K/UL — LOW (ref 3.8–10.5)

## 2019-12-14 PROCEDURE — 99233 SBSQ HOSP IP/OBS HIGH 50: CPT

## 2019-12-14 RX ORDER — PANTOPRAZOLE SODIUM 20 MG/1
40 TABLET, DELAYED RELEASE ORAL DAILY
Refills: 0 | Status: DISCONTINUED | OUTPATIENT
Start: 2019-12-14 | End: 2019-12-15

## 2019-12-14 RX ADMIN — SIMVASTATIN 20 MILLIGRAM(S): 20 TABLET, FILM COATED ORAL at 20:05

## 2019-12-14 RX ADMIN — QUETIAPINE FUMARATE 50 MILLIGRAM(S): 200 TABLET, FILM COATED ORAL at 12:16

## 2019-12-14 RX ADMIN — Medication 1 TABLET(S): at 12:16

## 2019-12-14 RX ADMIN — PANTOPRAZOLE SODIUM 40 MILLIGRAM(S): 20 TABLET, DELAYED RELEASE ORAL at 15:26

## 2019-12-14 RX ADMIN — Medication 100 MILLIGRAM(S): at 12:16

## 2019-12-14 RX ADMIN — Medication 25 MILLIGRAM(S): at 05:53

## 2019-12-14 RX ADMIN — Medication 2 MILLIGRAM(S): at 20:05

## 2019-12-14 RX ADMIN — Medication 25 MILLIGRAM(S): at 12:56

## 2019-12-14 RX ADMIN — Medication 25 MILLIGRAM(S): at 17:31

## 2019-12-14 RX ADMIN — Medication 1 GRAM(S): at 12:17

## 2019-12-14 RX ADMIN — Medication 1 GRAM(S): at 17:29

## 2019-12-14 RX ADMIN — Medication 2 MILLIGRAM(S): at 10:16

## 2019-12-14 RX ADMIN — Medication 1 MILLIGRAM(S): at 12:16

## 2019-12-14 RX ADMIN — Medication 1 GRAM(S): at 05:53

## 2019-12-14 RX ADMIN — PANTOPRAZOLE SODIUM 40 MILLIGRAM(S): 20 TABLET, DELAYED RELEASE ORAL at 05:53

## 2019-12-14 NOTE — PROGRESS NOTE ADULT - SUBJECTIVE AND OBJECTIVE BOX
HPI:  54 years old male here c/o headache, epigastric abd pain, chest pain nausea vomiting since two to three days ago Pt admits alcohol drinking daily last drink was at 18:00 pm yesterday. Pt denies, dizziness, blurred visions, light sensitivities, focal/distal weakness or numbness, cough, sob, chest pain, fever, chills, dysuria or irregular bowel movements. CIWA score is 12. (13 Dec 2019 10:22)      Patient is a 54y old  Male who presents with a chief complaint of ETOH withdrawal (13 Dec 2019 10:22)      INTERVAL HPI/OVERNIGHT EVENTS: no acute everbts     MEDICATIONS  (STANDING):  chlordiazePOXIDE 25 milliGRAM(s) Oral four times a day  folic acid 1 milliGRAM(s) Oral daily  multivitamin 1 Tablet(s) Oral daily  pantoprazole  Injectable 40 milliGRAM(s) IV Push daily  QUEtiapine 50 milliGRAM(s) Oral daily  simvastatin 20 milliGRAM(s) Oral at bedtime  sucralfate 1 Gram(s) Oral four times a day  thiamine 100 milliGRAM(s) Oral daily    MEDICATIONS  (PRN):  LORazepam   Injectable 2 milliGRAM(s) IV Push every 2 hours PRN Agitation      Allergies    No Known Allergies    Intolerances        REVIEW OF SYSTEMS:  CONSTITUTIONAL: No fever, weight loss, or fatigue  EYES: No eye pain, visual disturbances, or discharge  ENMT:  No difficulty hearing, tinnitus, vertigo; No sinus or throat pain  NECK: No pain or stiffness  BREASTS: No pain, masses, or nipple discharge  RESPIRATORY: No cough, wheezing, chills or hemoptysis; No shortness of breath  CARDIOVASCULAR: No chest pain, palpitations, dizziness, or leg swelling  GASTROINTESTINAL:  epigastric pain   GENITOURINARY: No dysuria, frequency, hematuria, or incontinence  NEUROLOGICAL: No headaches, memory loss, loss of strength, numbness, or tremors  SKIN: No itching, burning, rashes, or lesions   LYMPH NODES: No enlarged glands  ENDOCRINE: No heat or cold intolerance; No hair loss  MUSCULOSKELETAL: No joint pain or swelling; No muscle, back, or extremity pain  PSYCHIATRIC: No depression, anxiety, mood swings, or difficulty sleeping  HEME/LYMPH: No easy bruising, or bleeding gums  ALLERGY AND IMMUNOLOGIC: No hives or eczema    Vital Signs Last 24 Hrs  T(C): 36.7 (14 Dec 2019 10:15), Max: 37.4 (13 Dec 2019 17:03)  T(F): 98.1 (14 Dec 2019 10:15), Max: 99.3 (13 Dec 2019 17:03)  HR: 85 (14 Dec 2019 10:15) (54 - 85)  BP: 120/79 (14 Dec 2019 10:15) (111/76 - 132/81)  BP(mean): --  RR: 17 (14 Dec 2019 10:15) (17 - 18)  SpO2: 97% (14 Dec 2019 10:15) (97% - 98%)    PHYSICAL EXAM:  GENERAL: NAD, well-groomed, well-developed  HEAD:  Atraumatic, Normocephalic  EYES: EOMI, PERRLA, conjunctiva and sclera clear  ENMT: No tonsillar erythema, exudates, or enlargement; Moist mucous membranes, Good dentition, No lesions  NECK: Supple, No JVD, Normal thyroid  NERVOUS SYSTEM:  Alert & Oriented X3, Good concentration; Motor Strength 5/5 B/L upper and lower extremities; DTRs 2+ intact and symmetric  CHEST/LUNG: Clear to percussion bilaterally; No rales, rhonchi, wheezing, or rubs  HEART: Regular rate and rhythm; No murmurs, rubs, or gallops  ABDOMEN: Soft, Nontender, Nondistended; Bowel sounds present  EXTREMITIES:  2+ Peripheral Pulses, No clubbing, cyanosis, or edema  LYMPH: No lymphadenopathy noted  SKIN: No rashes or lesions    LABS:                        11.5   3.22  )-----------( 268      ( 14 Dec 2019 07:27 )             35.9     12-14    139  |  107  |  7   ----------------------------<  85  3.7   |  28  |  0.79    Ca    8.4<L>      14 Dec 2019 07:27  Phos  2.8     12-14  Mg     1.9     12-14    TPro  7.7  /  Alb  3.8  /  TBili  0.2  /  DBili  x   /  AST  51<H>  /  ALT  59  /  AlkPhos  42  12-13    PT/INR - ( 13 Dec 2019 07:59 )   PT: 11.2 sec;   INR: 1.00 ratio         PTT - ( 13 Dec 2019 07:59 )  PTT:27.7 sec    CAPILLARY BLOOD GLUCOSE          RADIOLOGY & ADDITIONAL TESTS:    Imaging Personally Reviewed:  [X ] YES  [ ] NO    Consultant(s) Notes Reviewed:  [ ] YES  [ ] NO    Care Discussed with Consultants/Other Providers [X ] YES  [ ] NO

## 2019-12-15 ENCOUNTER — TRANSCRIPTION ENCOUNTER (OUTPATIENT)
Age: 54
End: 2019-12-15

## 2019-12-15 VITALS
TEMPERATURE: 99 F | SYSTOLIC BLOOD PRESSURE: 134 MMHG | OXYGEN SATURATION: 100 % | HEART RATE: 56 BPM | DIASTOLIC BLOOD PRESSURE: 80 MMHG | RESPIRATION RATE: 18 BRPM

## 2019-12-15 LAB
ANION GAP SERPL CALC-SCNC: 9 MMOL/L — SIGNIFICANT CHANGE UP (ref 5–17)
BUN SERPL-MCNC: 7 MG/DL — SIGNIFICANT CHANGE UP (ref 7–23)
CALCIUM SERPL-MCNC: 9 MG/DL — SIGNIFICANT CHANGE UP (ref 8.5–10.1)
CHLORIDE SERPL-SCNC: 108 MMOL/L — SIGNIFICANT CHANGE UP (ref 96–108)
CO2 SERPL-SCNC: 23 MMOL/L — SIGNIFICANT CHANGE UP (ref 22–31)
CREAT SERPL-MCNC: 0.8 MG/DL — SIGNIFICANT CHANGE UP (ref 0.5–1.3)
GLUCOSE SERPL-MCNC: 90 MG/DL — SIGNIFICANT CHANGE UP (ref 70–99)
HCT VFR BLD CALC: 40.6 % — SIGNIFICANT CHANGE UP (ref 39–50)
HGB BLD-MCNC: 13 G/DL — SIGNIFICANT CHANGE UP (ref 13–17)
MAGNESIUM SERPL-MCNC: 2 MG/DL — SIGNIFICANT CHANGE UP (ref 1.6–2.6)
MCHC RBC-ENTMCNC: 29 PG — SIGNIFICANT CHANGE UP (ref 27–34)
MCHC RBC-ENTMCNC: 32 GM/DL — SIGNIFICANT CHANGE UP (ref 32–36)
MCV RBC AUTO: 90.6 FL — SIGNIFICANT CHANGE UP (ref 80–100)
NRBC # BLD: 0 /100 WBCS — SIGNIFICANT CHANGE UP (ref 0–0)
PHOSPHATE SERPL-MCNC: 3 MG/DL — SIGNIFICANT CHANGE UP (ref 2.5–4.5)
PLATELET # BLD AUTO: 270 K/UL — SIGNIFICANT CHANGE UP (ref 150–400)
POTASSIUM SERPL-MCNC: 3.7 MMOL/L — SIGNIFICANT CHANGE UP (ref 3.5–5.3)
POTASSIUM SERPL-SCNC: 3.7 MMOL/L — SIGNIFICANT CHANGE UP (ref 3.5–5.3)
RBC # BLD: 4.48 M/UL — SIGNIFICANT CHANGE UP (ref 4.2–5.8)
RBC # FLD: 13.6 % — SIGNIFICANT CHANGE UP (ref 10.3–14.5)
SODIUM SERPL-SCNC: 140 MMOL/L — SIGNIFICANT CHANGE UP (ref 135–145)
WBC # BLD: 3.05 K/UL — LOW (ref 3.8–10.5)
WBC # FLD AUTO: 3.05 K/UL — LOW (ref 3.8–10.5)

## 2019-12-15 PROCEDURE — 99239 HOSP IP/OBS DSCHRG MGMT >30: CPT

## 2019-12-15 RX ADMIN — QUETIAPINE FUMARATE 50 MILLIGRAM(S): 200 TABLET, FILM COATED ORAL at 13:30

## 2019-12-15 RX ADMIN — Medication 1 TABLET(S): at 12:16

## 2019-12-15 RX ADMIN — Medication 25 MILLIGRAM(S): at 00:28

## 2019-12-15 RX ADMIN — Medication 1 GRAM(S): at 12:09

## 2019-12-15 RX ADMIN — PANTOPRAZOLE SODIUM 40 MILLIGRAM(S): 20 TABLET, DELAYED RELEASE ORAL at 12:16

## 2019-12-15 RX ADMIN — Medication 25 MILLIGRAM(S): at 00:34

## 2019-12-15 RX ADMIN — Medication 25 MILLIGRAM(S): at 12:08

## 2019-12-15 RX ADMIN — Medication 1 GRAM(S): at 00:25

## 2019-12-15 RX ADMIN — Medication 1 MILLIGRAM(S): at 12:16

## 2019-12-15 RX ADMIN — Medication 100 MILLIGRAM(S): at 12:09

## 2019-12-15 RX ADMIN — Medication 1 GRAM(S): at 05:27

## 2019-12-15 RX ADMIN — Medication 2 MILLIGRAM(S): at 00:44

## 2019-12-15 RX ADMIN — Medication 25 MILLIGRAM(S): at 05:27

## 2019-12-15 NOTE — DISCHARGE NOTE NURSING/CASE MANAGEMENT/SOCIAL WORK - PATIENT PORTAL LINK FT
You can access the FollowMyHealth Patient Portal offered by [facility] by registering at the following website: [email or web address]. By joining [employer]’s FollowMyHealth portal, you will also be able to view your health information using other applications (apps) compatible with our system.

## 2019-12-15 NOTE — DISCHARGE NOTE PROVIDER - HOSPITAL COURSE
HPI:    54 years old male here c/o headache, epigastric abd pain, chest pain nausea vomiting since two to three days ago Pt admits alcohol drinking daily last drink was at 18:00 pm yesterday. Pt denies, dizziness, blurred visions, light sensitivities, focal/distal weakness or numbness, cough, sob, chest pain, fever, chills, dysuria or irregular bowel movements. CIWA score is 12. (13 Dec 2019 10:22)            patient did well with reduction of CIWA and tolerating diet

## 2019-12-15 NOTE — DISCHARGE NOTE PROVIDER - NSDCCPCAREPLAN_GEN_ALL_CORE_FT
PRINCIPAL DISCHARGE DIAGNOSIS  Diagnosis: Alcohol withdrawal  Assessment and Plan of Treatment: please refrain from drinking

## 2019-12-15 NOTE — DISCHARGE NOTE PROVIDER - PROVIDER TOKENS
FREE:[LAST:[[place]],PHONE:[(   )    -],FAX:[(   )    -],ADDRESS:[[place],   Phone: [phone]]]

## 2019-12-18 DIAGNOSIS — K21.9 GASTRO-ESOPHAGEAL REFLUX DISEASE WITHOUT ESOPHAGITIS: ICD-10-CM

## 2019-12-18 DIAGNOSIS — E78.2 MIXED HYPERLIPIDEMIA: ICD-10-CM

## 2019-12-18 DIAGNOSIS — F10.229 ALCOHOL DEPENDENCE WITH INTOXICATION, UNSPECIFIED: ICD-10-CM

## 2019-12-18 DIAGNOSIS — K27.9 PEPTIC ULCER, SITE UNSPECIFIED, UNSPECIFIED AS ACUTE OR CHRONIC, WITHOUT HEMORRHAGE OR PERFORATION: ICD-10-CM

## 2019-12-18 DIAGNOSIS — K29.20 ALCOHOLIC GASTRITIS WITHOUT BLEEDING: ICD-10-CM

## 2019-12-18 DIAGNOSIS — R11.2 NAUSEA WITH VOMITING, UNSPECIFIED: ICD-10-CM

## 2019-12-18 DIAGNOSIS — F10.239 ALCOHOL DEPENDENCE WITH WITHDRAWAL, UNSPECIFIED: ICD-10-CM

## 2020-01-06 ENCOUNTER — EMERGENCY (EMERGENCY)
Facility: HOSPITAL | Age: 55
LOS: 0 days | Discharge: LEFT BEFORE DISCHARGE | End: 2020-01-06
Attending: EMERGENCY MEDICINE
Payer: MEDICAID

## 2020-01-06 VITALS
SYSTOLIC BLOOD PRESSURE: 135 MMHG | TEMPERATURE: 98 F | HEIGHT: 68 IN | OXYGEN SATURATION: 98 % | RESPIRATION RATE: 17 BRPM | HEART RATE: 112 BPM | DIASTOLIC BLOOD PRESSURE: 99 MMHG | WEIGHT: 149.91 LBS

## 2020-01-06 VITALS
RESPIRATION RATE: 18 BRPM | OXYGEN SATURATION: 95 % | HEART RATE: 94 BPM | TEMPERATURE: 98 F | DIASTOLIC BLOOD PRESSURE: 75 MMHG | SYSTOLIC BLOOD PRESSURE: 126 MMHG

## 2020-01-06 DIAGNOSIS — K27.9 PEPTIC ULCER, SITE UNSPECIFIED, UNSPECIFIED AS ACUTE OR CHRONIC, WITHOUT HEMORRHAGE OR PERFORATION: ICD-10-CM

## 2020-01-06 DIAGNOSIS — K29.70 GASTRITIS, UNSPECIFIED, WITHOUT BLEEDING: ICD-10-CM

## 2020-01-06 DIAGNOSIS — F10.129 ALCOHOL ABUSE WITH INTOXICATION, UNSPECIFIED: ICD-10-CM

## 2020-01-06 DIAGNOSIS — R10.9 UNSPECIFIED ABDOMINAL PAIN: ICD-10-CM

## 2020-01-06 PROCEDURE — 99284 EMERGENCY DEPT VISIT MOD MDM: CPT

## 2020-01-06 RX ORDER — SODIUM CHLORIDE 9 MG/ML
1000 INJECTION INTRAMUSCULAR; INTRAVENOUS; SUBCUTANEOUS ONCE
Refills: 0 | Status: DISCONTINUED | OUTPATIENT
Start: 2020-01-06 | End: 2020-01-06

## 2020-01-06 RX ORDER — FAMOTIDINE 10 MG/ML
20 INJECTION INTRAVENOUS ONCE
Refills: 0 | Status: DISCONTINUED | OUTPATIENT
Start: 2020-01-06 | End: 2020-01-06

## 2020-01-06 RX ORDER — IOHEXOL 300 MG/ML
30 INJECTION, SOLUTION INTRAVENOUS ONCE
Refills: 0 | Status: DISCONTINUED | OUTPATIENT
Start: 2020-01-06 | End: 2020-01-06

## 2020-01-06 NOTE — ED PROVIDER NOTE - CLINICAL SUMMARY MEDICAL DECISION MAKING FREE TEXT BOX
Patient with alcohol abuse, presents with abdominal pain awaiting labs, ct Patient with alcohol abuse, presents with abdominal pain awaiting labs, ct    Pt awake and alert, walking straight line, refusing all bloodwork and would like to go as pt states he needs IV pain meds, when I offered something for his stomach to trial first pt was upset and got up from ana, NILESH&Angela3 and walking straight line - clinically sober states will leave as he is not getting IV pain medication first

## 2020-01-06 NOTE — ED PROVIDER NOTE - CHPI ED SYMPTOMS NEG
no chills/no blood in stool/no diarrhea/no abdominal distension/no vomiting/no burning urination/no hematuria/no fever/no nausea

## 2020-01-06 NOTE — ED PROVIDER NOTE - PATIENT PORTAL LINK FT
You can access the FollowMyHealth Patient Portal offered by [facility] by registering at the following website: [email or web address]. By joining [employer]’s FollowMyHealth portal, you will also be able to view your health information using other applications (apps) compatible with our system.

## 2020-01-07 ENCOUNTER — INPATIENT (INPATIENT)
Facility: HOSPITAL | Age: 55
LOS: 11 days | Discharge: HOME | End: 2020-01-19
Attending: INTERNAL MEDICINE | Admitting: HOSPITALIST
Payer: MEDICAID

## 2020-01-07 VITALS
HEIGHT: 68 IN | HEART RATE: 104 BPM | SYSTOLIC BLOOD PRESSURE: 94 MMHG | TEMPERATURE: 97 F | DIASTOLIC BLOOD PRESSURE: 64 MMHG | RESPIRATION RATE: 24 BRPM | OXYGEN SATURATION: 98 % | WEIGHT: 160.06 LBS

## 2020-01-07 LAB
ALBUMIN SERPL ELPH-MCNC: 4.4 G/DL — SIGNIFICANT CHANGE UP (ref 3.3–5)
ALP SERPL-CCNC: 54 U/L — SIGNIFICANT CHANGE UP (ref 40–120)
ALT FLD-CCNC: 51 U/L — SIGNIFICANT CHANGE UP (ref 12–78)
ANION GAP SERPL CALC-SCNC: 22 MMOL/L — HIGH (ref 5–17)
AST SERPL-CCNC: 65 U/L — HIGH (ref 15–37)
BASOPHILS # BLD AUTO: 0.05 K/UL — SIGNIFICANT CHANGE UP (ref 0–0.2)
BASOPHILS NFR BLD AUTO: 0.6 % — SIGNIFICANT CHANGE UP (ref 0–2)
BILIRUB SERPL-MCNC: 0.7 MG/DL — SIGNIFICANT CHANGE UP (ref 0.2–1.2)
BUN SERPL-MCNC: 13 MG/DL — SIGNIFICANT CHANGE UP (ref 7–23)
CALCIUM SERPL-MCNC: 9.5 MG/DL — SIGNIFICANT CHANGE UP (ref 8.5–10.1)
CHLORIDE SERPL-SCNC: 96 MMOL/L — SIGNIFICANT CHANGE UP (ref 96–108)
CO2 SERPL-SCNC: 20 MMOL/L — LOW (ref 22–31)
CREAT SERPL-MCNC: 1.1 MG/DL — SIGNIFICANT CHANGE UP (ref 0.5–1.3)
EOSINOPHIL # BLD AUTO: 0.01 K/UL — SIGNIFICANT CHANGE UP (ref 0–0.5)
EOSINOPHIL NFR BLD AUTO: 0.1 % — SIGNIFICANT CHANGE UP (ref 0–6)
GLUCOSE BLDC GLUCOMTR-MCNC: 85 MG/DL — SIGNIFICANT CHANGE UP (ref 70–99)
GLUCOSE SERPL-MCNC: 88 MG/DL — SIGNIFICANT CHANGE UP (ref 70–99)
HCT VFR BLD CALC: 47.2 % — SIGNIFICANT CHANGE UP (ref 39–50)
HGB BLD-MCNC: 15.7 G/DL — SIGNIFICANT CHANGE UP (ref 13–17)
IMM GRANULOCYTES NFR BLD AUTO: 0.1 % — SIGNIFICANT CHANGE UP (ref 0–1.5)
LACTATE SERPL-SCNC: 9.1 MMOL/L — CRITICAL HIGH (ref 0.7–2)
LIDOCAIN IGE QN: 185 U/L — SIGNIFICANT CHANGE UP (ref 73–393)
LYMPHOCYTES # BLD AUTO: 2.26 K/UL — SIGNIFICANT CHANGE UP (ref 1–3.3)
LYMPHOCYTES # BLD AUTO: 28.6 % — SIGNIFICANT CHANGE UP (ref 13–44)
MCHC RBC-ENTMCNC: 28.5 PG — SIGNIFICANT CHANGE UP (ref 27–34)
MCHC RBC-ENTMCNC: 33.3 GM/DL — SIGNIFICANT CHANGE UP (ref 32–36)
MCV RBC AUTO: 85.8 FL — SIGNIFICANT CHANGE UP (ref 80–100)
MONOCYTES # BLD AUTO: 0.35 K/UL — SIGNIFICANT CHANGE UP (ref 0–0.9)
MONOCYTES NFR BLD AUTO: 4.4 % — SIGNIFICANT CHANGE UP (ref 2–14)
NEUTROPHILS # BLD AUTO: 5.22 K/UL — SIGNIFICANT CHANGE UP (ref 1.8–7.4)
NEUTROPHILS NFR BLD AUTO: 66.2 % — SIGNIFICANT CHANGE UP (ref 43–77)
NRBC # BLD: 0 /100 WBCS — SIGNIFICANT CHANGE UP (ref 0–0)
PLATELET # BLD AUTO: 287 K/UL — SIGNIFICANT CHANGE UP (ref 150–400)
POTASSIUM SERPL-MCNC: 3.8 MMOL/L — SIGNIFICANT CHANGE UP (ref 3.5–5.3)
POTASSIUM SERPL-SCNC: 3.8 MMOL/L — SIGNIFICANT CHANGE UP (ref 3.5–5.3)
PROT SERPL-MCNC: 8.9 GM/DL — HIGH (ref 6–8.3)
RBC # BLD: 5.5 M/UL — SIGNIFICANT CHANGE UP (ref 4.2–5.8)
RBC # FLD: 13 % — SIGNIFICANT CHANGE UP (ref 10.3–14.5)
SODIUM SERPL-SCNC: 138 MMOL/L — SIGNIFICANT CHANGE UP (ref 135–145)
WBC # BLD: 7.9 K/UL — SIGNIFICANT CHANGE UP (ref 3.8–10.5)
WBC # FLD AUTO: 7.9 K/UL — SIGNIFICANT CHANGE UP (ref 3.8–10.5)

## 2020-01-07 PROCEDURE — 99284 EMERGENCY DEPT VISIT MOD MDM: CPT

## 2020-01-07 PROCEDURE — 74176 CT ABD & PELVIS W/O CONTRAST: CPT | Mod: 26

## 2020-01-07 PROCEDURE — 93010 ELECTROCARDIOGRAM REPORT: CPT

## 2020-01-07 RX ORDER — FAMOTIDINE 10 MG/ML
20 INJECTION INTRAVENOUS ONCE
Refills: 0 | Status: COMPLETED | OUTPATIENT
Start: 2020-01-07 | End: 2020-01-07

## 2020-01-07 RX ORDER — SODIUM CHLORIDE 9 MG/ML
1000 INJECTION INTRAMUSCULAR; INTRAVENOUS; SUBCUTANEOUS ONCE
Refills: 0 | Status: COMPLETED | OUTPATIENT
Start: 2020-01-07 | End: 2020-01-07

## 2020-01-07 RX ORDER — ONDANSETRON 8 MG/1
8 TABLET, FILM COATED ORAL ONCE
Refills: 0 | Status: COMPLETED | OUTPATIENT
Start: 2020-01-07 | End: 2020-01-07

## 2020-01-07 RX ORDER — THIAMINE MONONITRATE (VIT B1) 100 MG
100 TABLET ORAL ONCE
Refills: 0 | Status: COMPLETED | OUTPATIENT
Start: 2020-01-07 | End: 2020-01-07

## 2020-01-07 RX ORDER — MORPHINE SULFATE 50 MG/1
4 CAPSULE, EXTENDED RELEASE ORAL ONCE
Refills: 0 | Status: DISCONTINUED | OUTPATIENT
Start: 2020-01-07 | End: 2020-01-07

## 2020-01-07 RX ORDER — FOLIC ACID 0.8 MG
1 TABLET ORAL ONCE
Refills: 0 | Status: COMPLETED | OUTPATIENT
Start: 2020-01-07 | End: 2020-01-07

## 2020-01-07 RX ADMIN — SODIUM CHLORIDE 1000 MILLILITER(S): 9 INJECTION INTRAMUSCULAR; INTRAVENOUS; SUBCUTANEOUS at 22:37

## 2020-01-07 RX ADMIN — Medication 2 MILLIGRAM(S): at 22:36

## 2020-01-07 RX ADMIN — MORPHINE SULFATE 4 MILLIGRAM(S): 50 CAPSULE, EXTENDED RELEASE ORAL at 20:06

## 2020-01-07 RX ADMIN — FAMOTIDINE 20 MILLIGRAM(S): 10 INJECTION INTRAVENOUS at 21:50

## 2020-01-07 RX ADMIN — Medication 2 MILLIGRAM(S): at 20:06

## 2020-01-07 RX ADMIN — ONDANSETRON 8 MILLIGRAM(S): 8 TABLET, FILM COATED ORAL at 21:50

## 2020-01-07 RX ADMIN — Medication 100 MILLIGRAM(S): at 22:37

## 2020-01-07 RX ADMIN — MORPHINE SULFATE 4 MILLIGRAM(S): 50 CAPSULE, EXTENDED RELEASE ORAL at 20:55

## 2020-01-07 RX ADMIN — Medication 30 MILLILITER(S): at 21:50

## 2020-01-07 RX ADMIN — SODIUM CHLORIDE 1000 MILLILITER(S): 9 INJECTION INTRAMUSCULAR; INTRAVENOUS; SUBCUTANEOUS at 20:06

## 2020-01-07 RX ADMIN — Medication 1 TABLET(S): at 22:37

## 2020-01-07 RX ADMIN — Medication 1 MILLIGRAM(S): at 22:36

## 2020-01-07 NOTE — ED ADULT NURSE NOTE - OBJECTIVE STATEMENT
pt A&Ox4 c/o abdominal pain, , nausea, and vomiting , pt has hx alcohol abuse withdrawal, pt admits he drank yesterday and today , and has been having coffee ground emesis.  PMH hyperlipidemia, HTN

## 2020-01-07 NOTE — ED ADULT TRIAGE NOTE - CHIEF COMPLAINT QUOTE
pt c/o abdominal pain, , nausea, and vomiting , pt has hx alcohol abuse withdrawal, pt admits he drank yesterday and had nothing , and has been having coffee ground emesis

## 2020-01-07 NOTE — ED PROVIDER NOTE - TOBACCO USE
Eyes with no visual disturbances.  Ears clean and dry and no hearing difficulties. Nose with pink mucosa and no drainage.  Mouth mucous membranes moist and pink.  No tenderness or swelling to throat or neck.
Unknown if ever smoked

## 2020-01-07 NOTE — ED PROVIDER NOTE - CLINICAL SUMMARY MEDICAL DECISION MAKING FREE TEXT BOX
pt with gastritis likely from alcohol related abd pain but noted vomiting and alcohol withdrawal will admit for further care with Dr. Salazar.

## 2020-01-07 NOTE — ED PROVIDER NOTE - OBJECTIVE STATEMENT
54 year old male with PMH of DTs from ETOH dependence and withdrawal, otherwise with symptoms of abdominal pain, epigastric, nausea/vomiting - pt states only drank alcohol for 3 days with new years. Denies any diarrhea. Was in ED for abd pain yesterday but walked out.

## 2020-01-08 DIAGNOSIS — Z29.9 ENCOUNTER FOR PROPHYLACTIC MEASURES, UNSPECIFIED: ICD-10-CM

## 2020-01-08 DIAGNOSIS — E78.5 HYPERLIPIDEMIA, UNSPECIFIED: ICD-10-CM

## 2020-01-08 DIAGNOSIS — I10 ESSENTIAL (PRIMARY) HYPERTENSION: ICD-10-CM

## 2020-01-08 DIAGNOSIS — F10.230 ALCOHOL DEPENDENCE WITH WITHDRAWAL, UNCOMPLICATED: ICD-10-CM

## 2020-01-08 DIAGNOSIS — K29.00 ACUTE GASTRITIS WITHOUT BLEEDING: ICD-10-CM

## 2020-01-08 LAB — LACTATE SERPL-SCNC: 4.4 MMOL/L — CRITICAL HIGH (ref 0.7–2)

## 2020-01-08 PROCEDURE — 71045 X-RAY EXAM CHEST 1 VIEW: CPT | Mod: 26

## 2020-01-08 PROCEDURE — 12345: CPT | Mod: NC

## 2020-01-08 PROCEDURE — 99222 1ST HOSP IP/OBS MODERATE 55: CPT

## 2020-01-08 PROCEDURE — 99291 CRITICAL CARE FIRST HOUR: CPT

## 2020-01-08 RX ORDER — FOLIC ACID 0.8 MG
1 TABLET ORAL DAILY
Refills: 0 | Status: DISCONTINUED | OUTPATIENT
Start: 2020-01-08 | End: 2020-01-19

## 2020-01-08 RX ORDER — ONDANSETRON 8 MG/1
8 TABLET, FILM COATED ORAL EVERY 6 HOURS
Refills: 0 | Status: DISCONTINUED | OUTPATIENT
Start: 2020-01-08 | End: 2020-01-14

## 2020-01-08 RX ORDER — HEPARIN SODIUM 5000 [USP'U]/ML
5000 INJECTION INTRAVENOUS; SUBCUTANEOUS EVERY 12 HOURS
Refills: 0 | Status: DISCONTINUED | OUTPATIENT
Start: 2020-01-08 | End: 2020-01-09

## 2020-01-08 RX ORDER — ATORVASTATIN CALCIUM 80 MG/1
20 TABLET, FILM COATED ORAL AT BEDTIME
Refills: 0 | Status: DISCONTINUED | OUTPATIENT
Start: 2020-01-08 | End: 2020-01-19

## 2020-01-08 RX ORDER — METOPROLOL TARTRATE 50 MG
25 TABLET ORAL
Refills: 0 | Status: DISCONTINUED | OUTPATIENT
Start: 2020-01-08 | End: 2020-01-09

## 2020-01-08 RX ORDER — HYDROMORPHONE HYDROCHLORIDE 2 MG/ML
1 INJECTION INTRAMUSCULAR; INTRAVENOUS; SUBCUTANEOUS EVERY 6 HOURS
Refills: 0 | Status: DISCONTINUED | OUTPATIENT
Start: 2020-01-08 | End: 2020-01-09

## 2020-01-08 RX ORDER — THIAMINE MONONITRATE (VIT B1) 100 MG
100 TABLET ORAL DAILY
Refills: 0 | Status: DISCONTINUED | OUTPATIENT
Start: 2020-01-08 | End: 2020-01-19

## 2020-01-08 RX ORDER — PANTOPRAZOLE SODIUM 20 MG/1
40 TABLET, DELAYED RELEASE ORAL
Refills: 0 | Status: DISCONTINUED | OUTPATIENT
Start: 2020-01-08 | End: 2020-01-14

## 2020-01-08 RX ADMIN — Medication 2 MILLIGRAM(S): at 18:12

## 2020-01-08 RX ADMIN — SODIUM CHLORIDE 1000 MILLILITER(S): 9 INJECTION INTRAMUSCULAR; INTRAVENOUS; SUBCUTANEOUS at 00:57

## 2020-01-08 RX ADMIN — HYDROMORPHONE HYDROCHLORIDE 1 MILLIGRAM(S): 2 INJECTION INTRAMUSCULAR; INTRAVENOUS; SUBCUTANEOUS at 10:15

## 2020-01-08 RX ADMIN — PANTOPRAZOLE SODIUM 40 MILLIGRAM(S): 20 TABLET, DELAYED RELEASE ORAL at 06:22

## 2020-01-08 RX ADMIN — Medication 2 MILLIGRAM(S): at 12:51

## 2020-01-08 RX ADMIN — HYDROMORPHONE HYDROCHLORIDE 1 MILLIGRAM(S): 2 INJECTION INTRAMUSCULAR; INTRAVENOUS; SUBCUTANEOUS at 10:00

## 2020-01-08 RX ADMIN — Medication 100 MILLIGRAM(S): at 11:43

## 2020-01-08 RX ADMIN — HEPARIN SODIUM 5000 UNIT(S): 5000 INJECTION INTRAVENOUS; SUBCUTANEOUS at 18:12

## 2020-01-08 RX ADMIN — Medication 1 MILLIGRAM(S): at 11:43

## 2020-01-08 RX ADMIN — PANTOPRAZOLE SODIUM 40 MILLIGRAM(S): 20 TABLET, DELAYED RELEASE ORAL at 18:11

## 2020-01-08 RX ADMIN — Medication 1 TABLET(S): at 11:43

## 2020-01-08 RX ADMIN — Medication 25 MILLIGRAM(S): at 18:12

## 2020-01-08 RX ADMIN — Medication 2 MILLIGRAM(S): at 06:15

## 2020-01-08 RX ADMIN — Medication 25 MILLIGRAM(S): at 06:15

## 2020-01-08 RX ADMIN — HEPARIN SODIUM 5000 UNIT(S): 5000 INJECTION INTRAVENOUS; SUBCUTANEOUS at 06:15

## 2020-01-08 NOTE — ED ADULT NURSE REASSESSMENT NOTE - NS ED NURSE REASSESS COMMENT FT1
Unable to obtain rpt lactate. Dr. Restrepo made aware and completed via [place] by MD. Requesting PO and provided food and fluids. Pt resting comfortably at this time. No s/s of pain noted.

## 2020-01-08 NOTE — H&P ADULT - NSICDXFAMILYHX_GEN_ALL_CORE_FT
FAMILY HISTORY:  Father  Still living? Yes, Estimated age: Age Unknown  No pertinent family history in first degree relatives, Age at diagnosis: Age Unknown    Mother  Still living? Yes, Estimated age: Age Unknown  No pertinent family history in first degree relatives, Age at diagnosis: Age Unknown
FAMILY HISTORY:  Father  Still living? Yes, Estimated age: Age Unknown  No pertinent family history in first degree relatives, Age at diagnosis: Age Unknown    Mother  Still living? Yes, Estimated age: Age Unknown  No pertinent family history in first degree relatives, Age at diagnosis: Age Unknown

## 2020-01-08 NOTE — H&P ADULT - HISTORY OF PRESENT ILLNESS
Patient is a 54 M with a PMH of  ETOH abuse and withdrawal (s/p frequent ED visits and admissions), GERD, HLD, alcoholic gastritis/esophagitis, PUD who presents for abdominal pain and shakes.  Patient reports that his last drink was 2 days ago and starting from yesteday he has had shakes and NBNB vomiting.  Patient reports epigastric pain like heartburn that started after he vomited.  Patient denies recent hx of seizures.  Patient has no other complaints.
55 yo male with ETOH abuse, DTs, gastritis, PUD, HLD presents to the ED with ETOH intoxication Pt is well known to service for several admissions. Pt was in ED other day for abd pain but left.  In ED pt had LA of 9 and BP 90s ( now 110s)

## 2020-01-08 NOTE — H&P ADULT - PROBLEM SELECTOR PLAN 2
CECILIA currently 2, recently received ativan  Will continue withdrawal protocol with Ativan 2 q6 standing  Ativan 2 mg IVP PRN for acute withdrawal symptoms  Thiamine, Folate, MVI daily

## 2020-01-08 NOTE — H&P ADULT - NSHPLABSRESULTS_GEN_ALL_CORE
Recent Vitals  T(C): 37.1 (01-07-20 @ 23:30), Max: 37.1 (01-07-20 @ 23:30)  HR: 105 (01-07-20 @ 23:30) (104 - 105)  BP: 152/84 (01-07-20 @ 23:30) (94/64 - 152/84)  RR: 15 (01-07-20 @ 23:30) (15 - 24)  SpO2: 93% (01-07-20 @ 23:30) (93% - 98%)                        15.7   7.90  )-----------( 287      ( 07 Jan 2020 19:56 )             47.2     01-07    138  |  96  |  13  ----------------------------<  88  3.8   |  20<L>  |  1.10    Ca    9.5      07 Jan 2020 20:16    TPro  8.9<H>  /  Alb  4.4  /  TBili  0.7  /  DBili  x   /  AST  65<H>  /  ALT  51  /  AlkPhos  54  01-07      LIVER FUNCTIONS - ( 07 Jan 2020 20:16 )  Alb: 4.4 g/dL / Pro: 8.9 gm/dL / ALK PHOS: 54 U/L / ALT: 51 U/L / AST: 65 U/L / GGT: x               Home Medications:
15.7   7.90  )-----------( 287      ( 07 Jan 2020 19:56 )             47.2     01-07    138  |  96  |  13  ----------------------------<  88  3.8   |  20<L>  |  1.10    Ca    9.5      07 Jan 2020 20:16    TPro  8.9<H>  /  Alb  4.4  /  TBili  0.7  /  DBili  x   /  AST  65<H>  /  ALT  51  /  AlkPhos  54  01-07      Lactate, Blood (01.07.20 @ 20:16)    Lactate, Blood: 9.1:    Vital Signs Last 24 Hrs  T(C): 37.1 (07 Jan 2020 23:30), Max: 37.1 (07 Jan 2020 23:30)  T(F): 98.7 (07 Jan 2020 23:30), Max: 98.7 (07 Jan 2020 23:30)  HR: 105 (07 Jan 2020 23:30) (104 - 105)  BP: 152/84 (07 Jan 2020 23:30) (94/64 - 152/84)  BP(mean): --  ABP: --  ABP(mean): --  RR: 15 (07 Jan 2020 23:30) (15 - 24)  SpO2: 93% (07 Jan 2020 23:30) (93% - 98%)

## 2020-01-08 NOTE — H&P ADULT - NSHPPHYSICALEXAM_GEN_ALL_CORE
Physical exam:  General: patient in no acute distress, resting comfortably  Cardio: S1/S2 +ve, regular rate and rhythm, no M/G/R  Resp: clear to ausculation bilaterally, no rales or wheezes  GI: abdomen soft, tender to palpation, non distended, no guarding, BS +ve x 4  Ext: no significant pedal edema  Neuro: CN 2-12 intact, no significant motor or sensory deficits.

## 2020-01-08 NOTE — H&P ADULT - NSICDXPASTMEDICALHX_GEN_ALL_CORE_FT
PAST MEDICAL HISTORY:  DTs (delirium tremens)     EtOH dependence     Gastritis     Mixed hyperlipidemia     PUD (peptic ulcer disease)     Substance abuse
PAST MEDICAL HISTORY:  DTs (delirium tremens)     EtOH dependence     Gastritis     Mixed hyperlipidemia     PUD (peptic ulcer disease)     Substance abuse

## 2020-01-08 NOTE — CHART NOTE - NSCHARTNOTEFT_GEN_A_CORE
patient seen and examined history of gastritis and etoh abuse doing better now plan for discharge in am of lactate improves

## 2020-01-08 NOTE — H&P ADULT - ASSESSMENT
54M with a hx of alcohol abuse presents to ED with alcohol withdrawal and epigastric pain.  Known hx of gastritis and esophagitis due to alcoholism.  Presents with severe lactic acidosis, seen by ICU team and deemed appropriate for tele.  Will admit to tele.
ASSESSMENT: 54yMale presents with ETOH abuse, hypotension-now improved. Lactic acidosis.    PLAN:   Neurologic: CIWA, Psych consult.    Respiratory: no issues    Cardiovascular: trend labs/LA, BP improved    Gastrointestinal/Nutrition: PPI, Folate, thiamine     Genitourinary/Renal: Monitor BUN/Cr.    Hematologic: DVT prophylaxis    Infectious Disease: RVP ,flu swab    Endocrine: ISS    Disposition: not candidate for ICU admission at this time. reconsult as necessary

## 2020-01-08 NOTE — CONSULT NOTE ADULT - SUBJECTIVE AND OBJECTIVE BOX
HPI:  53 yo male with ETOH abuse, DTs, gastritis, PUD, HLD presents to the ED with ETOH intoxication Pt is well known to service for several admissions. Pt was in ED other day for abd pain but left.  In ED pt had LA of 9 and BP 90s ( now improved      REVIEW OF SYSTEMS:    CONSTITUTIONAL: No weakness, fevers or chills  RESPIRATORY: No cough, wheezing, hemoptysis; No shortness of breath  CARDIOVASCULAR: No chest pain or palpitations  GASTROINTESTINAL: No abdominal or epigastric pain. No nausea, vomiting, or hematemesis; No diarrhea or constipation. No melena or hematochezia.  GENITOURINARY: No dysuria, frequency or hematuria  NEUROLOGICAL: No numbness or weakness  SKIN: No itching, burning, rashes, or lesions   All other review of systems is negative unless indicated above.      General: WN/WD NAD  Neurology: A&Ox3, nonfocal, BENDER x 4  Respiratory: CTA B/L, no wheezes  CV: tachycardic, S1S2, no murmurs  Abdominal: Soft, NT, ND +BS  Extremities: No edema, 2+ peripheral pulses  Lines: PIV                          15.7   7.90  )-----------( 287      ( 07 Jan 2020 19:56 )             47.2     01-07    138  |  96  |  13  ----------------------------<  88  3.8   |  20<L>  |  1.10    Ca    9.5      07 Jan 2020 20:16    TPro  8.9<H>  /  Alb  4.4  /  TBili  0.7  /  DBili  x   /  AST  65<H>  /  ALT  51  /  AlkPhos  54  01-07    Lactate, Blood (01.07.20 @ 20:16)    Lactate, Blood: 9.1    Vital Signs Last 24 Hrs  T(C): 37.1 (07 Jan 2020 23:30), Max: 37.1 (07 Jan 2020 23:30)  T(F): 98.7 (07 Jan 2020 23:30), Max: 98.7 (07 Jan 2020 23:30)  HR: 105 (07 Jan 2020 23:30) (104 - 105)  BP: 152/84 (07 Jan 2020 23:30) (94/64 - 152/84)  RR: 15 (07 Jan 2020 23:30) (15 - 24)  SpO2: 93% (07 Jan 2020 23:30) (93% - 98%)

## 2020-01-09 LAB
ALBUMIN SERPL ELPH-MCNC: 3.6 G/DL — SIGNIFICANT CHANGE UP (ref 3.3–5)
ALP SERPL-CCNC: 44 U/L — SIGNIFICANT CHANGE UP (ref 40–120)
ALT FLD-CCNC: 46 U/L — SIGNIFICANT CHANGE UP (ref 12–78)
ANION GAP SERPL CALC-SCNC: 8 MMOL/L — SIGNIFICANT CHANGE UP (ref 5–17)
AST SERPL-CCNC: 77 U/L — HIGH (ref 15–37)
BILIRUB DIRECT SERPL-MCNC: 0.18 MG/DL — SIGNIFICANT CHANGE UP (ref 0.05–0.2)
BILIRUB INDIRECT FLD-MCNC: 1 MG/DL — SIGNIFICANT CHANGE UP (ref 0.2–1)
BILIRUB SERPL-MCNC: 1.2 MG/DL — SIGNIFICANT CHANGE UP (ref 0.2–1.2)
BUN SERPL-MCNC: 13 MG/DL — SIGNIFICANT CHANGE UP (ref 7–23)
CALCIUM SERPL-MCNC: 8.8 MG/DL — SIGNIFICANT CHANGE UP (ref 8.5–10.1)
CHLORIDE SERPL-SCNC: 105 MMOL/L — SIGNIFICANT CHANGE UP (ref 96–108)
CO2 SERPL-SCNC: 25 MMOL/L — SIGNIFICANT CHANGE UP (ref 22–31)
CREAT SERPL-MCNC: 1.08 MG/DL — SIGNIFICANT CHANGE UP (ref 0.5–1.3)
GLUCOSE SERPL-MCNC: 109 MG/DL — HIGH (ref 70–99)
HCT VFR BLD CALC: 38.1 % — LOW (ref 39–50)
HGB BLD-MCNC: 12.4 G/DL — LOW (ref 13–17)
MAGNESIUM SERPL-MCNC: 2 MG/DL — SIGNIFICANT CHANGE UP (ref 1.6–2.6)
MCHC RBC-ENTMCNC: 28.4 PG — SIGNIFICANT CHANGE UP (ref 27–34)
MCHC RBC-ENTMCNC: 32.5 GM/DL — SIGNIFICANT CHANGE UP (ref 32–36)
MCV RBC AUTO: 87.4 FL — SIGNIFICANT CHANGE UP (ref 80–100)
NRBC # BLD: 0 /100 WBCS — SIGNIFICANT CHANGE UP (ref 0–0)
PHOSPHATE SERPL-MCNC: 1.8 MG/DL — LOW (ref 2.5–4.5)
PLATELET # BLD AUTO: 176 K/UL — SIGNIFICANT CHANGE UP (ref 150–400)
POTASSIUM SERPL-MCNC: 4.4 MMOL/L — SIGNIFICANT CHANGE UP (ref 3.5–5.3)
POTASSIUM SERPL-SCNC: 4.4 MMOL/L — SIGNIFICANT CHANGE UP (ref 3.5–5.3)
PROT SERPL-MCNC: 7.5 GM/DL — SIGNIFICANT CHANGE UP (ref 6–8.3)
RBC # BLD: 4.36 M/UL — SIGNIFICANT CHANGE UP (ref 4.2–5.8)
RBC # FLD: 12.5 % — SIGNIFICANT CHANGE UP (ref 10.3–14.5)
SODIUM SERPL-SCNC: 138 MMOL/L — SIGNIFICANT CHANGE UP (ref 135–145)
WBC # BLD: 4.59 K/UL — SIGNIFICANT CHANGE UP (ref 3.8–10.5)
WBC # FLD AUTO: 4.59 K/UL — SIGNIFICANT CHANGE UP (ref 3.8–10.5)

## 2020-01-09 PROCEDURE — 99291 CRITICAL CARE FIRST HOUR: CPT

## 2020-01-09 RX ORDER — SODIUM CHLORIDE 9 MG/ML
1000 INJECTION, SOLUTION INTRAVENOUS
Refills: 0 | Status: DISCONTINUED | OUTPATIENT
Start: 2020-01-09 | End: 2020-01-09

## 2020-01-09 RX ORDER — PHENOBARBITAL 60 MG
260 TABLET ORAL EVERY 6 HOURS
Refills: 0 | Status: DISCONTINUED | OUTPATIENT
Start: 2020-01-09 | End: 2020-01-13

## 2020-01-09 RX ORDER — SODIUM CHLORIDE 9 MG/ML
1000 INJECTION, SOLUTION INTRAVENOUS
Refills: 0 | Status: DISCONTINUED | OUTPATIENT
Start: 2020-01-09 | End: 2020-01-14

## 2020-01-09 RX ORDER — PHENOBARBITAL 60 MG
260 TABLET ORAL ONCE
Refills: 0 | Status: DISCONTINUED | OUTPATIENT
Start: 2020-01-09 | End: 2020-01-09

## 2020-01-09 RX ORDER — POTASSIUM PHOSPHATE, MONOBASIC POTASSIUM PHOSPHATE, DIBASIC 236; 224 MG/ML; MG/ML
15 INJECTION, SOLUTION INTRAVENOUS ONCE
Refills: 0 | Status: COMPLETED | OUTPATIENT
Start: 2020-01-09 | End: 2020-01-09

## 2020-01-09 RX ORDER — PHENOBARBITAL 60 MG
260 TABLET ORAL
Refills: 0 | Status: DISCONTINUED | OUTPATIENT
Start: 2020-01-09 | End: 2020-01-09

## 2020-01-09 RX ORDER — PHENOBARBITAL 60 MG
130 TABLET ORAL
Refills: 0 | Status: DISCONTINUED | OUTPATIENT
Start: 2020-01-09 | End: 2020-01-15

## 2020-01-09 RX ORDER — DEXMEDETOMIDINE HYDROCHLORIDE IN 0.9% SODIUM CHLORIDE 4 UG/ML
0.4 INJECTION INTRAVENOUS
Qty: 200 | Refills: 0 | Status: DISCONTINUED | OUTPATIENT
Start: 2020-01-09 | End: 2020-01-17

## 2020-01-09 RX ORDER — PHENOBARBITAL 60 MG
130 TABLET ORAL DAILY
Refills: 0 | Status: DISCONTINUED | OUTPATIENT
Start: 2020-01-09 | End: 2020-01-09

## 2020-01-09 RX ORDER — PHENOBARBITAL 60 MG
130 TABLET ORAL ONCE
Refills: 0 | Status: DISCONTINUED | OUTPATIENT
Start: 2020-01-09 | End: 2020-01-09

## 2020-01-09 RX ORDER — PHENOBARBITAL 60 MG
260 TABLET ORAL EVERY 4 HOURS
Refills: 0 | Status: DISCONTINUED | OUTPATIENT
Start: 2020-01-09 | End: 2020-01-09

## 2020-01-09 RX ORDER — ENOXAPARIN SODIUM 100 MG/ML
40 INJECTION SUBCUTANEOUS DAILY
Refills: 0 | Status: DISCONTINUED | OUTPATIENT
Start: 2020-01-09 | End: 2020-01-19

## 2020-01-09 RX ORDER — CHLORHEXIDINE GLUCONATE 213 G/1000ML
1 SOLUTION TOPICAL
Refills: 0 | Status: DISCONTINUED | OUTPATIENT
Start: 2020-01-09 | End: 2020-01-19

## 2020-01-09 RX ADMIN — Medication 130 MILLIGRAM(S): at 08:16

## 2020-01-09 RX ADMIN — HEPARIN SODIUM 5000 UNIT(S): 5000 INJECTION INTRAVENOUS; SUBCUTANEOUS at 05:55

## 2020-01-09 RX ADMIN — SODIUM CHLORIDE 75 MILLILITER(S): 9 INJECTION, SOLUTION INTRAVENOUS at 20:40

## 2020-01-09 RX ADMIN — DEXMEDETOMIDINE HYDROCHLORIDE IN 0.9% SODIUM CHLORIDE 7.31 MICROGRAM(S)/KG/HR: 4 INJECTION INTRAVENOUS at 10:30

## 2020-01-09 RX ADMIN — Medication 260 MILLIGRAM(S): at 11:00

## 2020-01-09 RX ADMIN — Medication 25 MILLIGRAM(S): at 05:57

## 2020-01-09 RX ADMIN — CHLORHEXIDINE GLUCONATE 1 APPLICATION(S): 213 SOLUTION TOPICAL at 11:30

## 2020-01-09 RX ADMIN — PANTOPRAZOLE SODIUM 40 MILLIGRAM(S): 20 TABLET, DELAYED RELEASE ORAL at 05:55

## 2020-01-09 RX ADMIN — PANTOPRAZOLE SODIUM 40 MILLIGRAM(S): 20 TABLET, DELAYED RELEASE ORAL at 17:47

## 2020-01-09 RX ADMIN — ENOXAPARIN SODIUM 40 MILLIGRAM(S): 100 INJECTION SUBCUTANEOUS at 17:50

## 2020-01-09 RX ADMIN — Medication 2 MILLIGRAM(S): at 09:10

## 2020-01-09 RX ADMIN — DEXMEDETOMIDINE HYDROCHLORIDE IN 0.9% SODIUM CHLORIDE 7.31 MICROGRAM(S)/KG/HR: 4 INJECTION INTRAVENOUS at 14:19

## 2020-01-09 RX ADMIN — Medication 2 MILLIGRAM(S): at 07:15

## 2020-01-09 RX ADMIN — Medication 260 MILLIGRAM(S): at 13:05

## 2020-01-09 RX ADMIN — POTASSIUM PHOSPHATE, MONOBASIC POTASSIUM PHOSPHATE, DIBASIC 62.5 MILLIMOLE(S): 236; 224 INJECTION, SOLUTION INTRAVENOUS at 13:05

## 2020-01-09 RX ADMIN — DEXMEDETOMIDINE HYDROCHLORIDE IN 0.9% SODIUM CHLORIDE 7.31 MICROGRAM(S)/KG/HR: 4 INJECTION INTRAVENOUS at 17:00

## 2020-01-09 RX ADMIN — Medication 2 MILLIGRAM(S): at 05:55

## 2020-01-09 RX ADMIN — Medication 2 MILLIGRAM(S): at 01:15

## 2020-01-09 RX ADMIN — Medication 4 MILLIGRAM(S): at 10:23

## 2020-01-09 RX ADMIN — DEXMEDETOMIDINE HYDROCHLORIDE IN 0.9% SODIUM CHLORIDE 7.31 MICROGRAM(S)/KG/HR: 4 INJECTION INTRAVENOUS at 12:00

## 2020-01-09 RX ADMIN — Medication 2 MILLIGRAM(S): at 04:09

## 2020-01-09 RX ADMIN — Medication 2 MILLIGRAM(S): at 07:43

## 2020-01-09 RX ADMIN — Medication 260 MILLIGRAM(S): at 19:04

## 2020-01-09 RX ADMIN — Medication 260 MILLIGRAM(S): at 10:33

## 2020-01-09 NOTE — CONSULT NOTE ADULT - SUBJECTIVE AND OBJECTIVE BOX
Patient is a 54y old  Male who presents with a chief complaint of epigastric pain, withdrawal (2020 00:55)      HPI:  Patient is a 54 M with a PMH of  ETOH abuse and withdrawal (s/p frequent ED visits and admissions), GERD, HLD, alcoholic gastritis/esophagitis, PUD who presents for abdominal pain and shakes.  Patient reports that his last drink was 2 days ago and starting from yesteday he has had shakes and NBNB vomiting.  Patient reports epigastric pain like heartburn that started after he vomited.  Patient denies recent hx of seizures.  Patient has no other complaints. (2020 01:55)      Allergies    No Known Allergies    Intolerances        MEDICATIONS  (STANDING):  atorvastatin 20 milliGRAM(s) Oral at bedtime  dexMEDEtomidine Infusion 0.4 MICROgram(s)/kG/Hr (7.31 mL/Hr) IV Continuous <Continuous>  folic acid 1 milliGRAM(s) Oral daily  heparin  Injectable 5000 Unit(s) SubCutaneous every 12 hours  LORazepam   Injectable 2 milliGRAM(s) IV Push every 6 hours  LORazepam   Injectable 4 milliGRAM(s) IV Push once  metoprolol tartrate 25 milliGRAM(s) Oral two times a day  multivitamin 1 Tablet(s) Oral daily  pantoprazole  Injectable 40 milliGRAM(s) IV Push two times a day  PHENobarbital Injectable 260 milliGRAM(s) IV Push once  thiamine 100 milliGRAM(s) Oral daily    MEDICATIONS  (PRN):  HYDROmorphone  Injectable 1 milliGRAM(s) IV Push every 6 hours PRN Severe Pain (7 - 10)  LORazepam   Injectable 2 milliGRAM(s) IV Push every 1 hour PRN alc withdrawal  ondansetron Injectable 8 milliGRAM(s) IV Push every 6 hours PRN Nausea and/or Vomiting      Daily Height in cm: 170.18 (2020 01:38)    Daily Weight in k.1 (:38)    Drug Dosing Weight  Height (cm): 170.2 (:38)  Weight (kg): 73.1 (:38)  BMI (kg/m2): 25.2 (:38)  BSA (m2): 1.84 (:38)    PAST MEDICAL & SURGICAL HISTORY:  DTs (delirium tremens)  Substance abuse  Gastritis  EtOH dependence  Mixed hyperlipidemia  PUD (peptic ulcer disease)  No significant past surgical history      FAMILY HISTORY:  No pertinent family history in first degree relatives (Father, Mother)      SOCIAL HISTORY:    ADVANCE DIRECTIVES:    REVIEW OF SYSTEMS:    CONSTITUTIONAL: No fever, weight loss, or fatigue  EYES: No eye pain, visual disturbances, or discharge  ENMT:  No difficulty hearing, tinnitus, vertigo; No sinus or throat pain  NECK: No pain or stiffness  BREASTS: No pain, masses, or nipple discharge  RESPIRATORY: No cough, wheezing, chills or hemoptysis; No shortness of breath  CARDIOVASCULAR: No chest pain, palpitations, dizziness, or leg swelling  GASTROINTESTINAL: No abdominal or epigastric pain. No nausea, vomiting, or hematemesis; No diarrhea or constipation. No melena or hematochezia.  GENITOURINARY: No dysuria, frequency, hematuria, or incontinence  NEUROLOGICAL: No headaches, memory loss, loss of strength, numbness, or tremors  SKIN: No itching, burning, rashes, or lesions   LYMPH NODES: No enlarged glands  ENDOCRINE: No heat or cold intolerance; No hair loss  MUSCULOSKELETAL: No joint pain or swelling; No muscle, back, or extremity pain  PSYCHIATRIC: No depression, anxiety, mood swings, or difficulty sleeping  HEME/LYMPH: No easy bruising, or bleeding gums  ALLERGY AND IMMUNOLOGIC: No hives or eczema          ICU Vital Signs Last 24 Hrs  T(C): 36.6 (2020 01:38), Max: 37.2 (2020 22:04)  T(F): 97.9 (2020 01:38), Max: 98.9 (2020 22:04)  HR: 81 (2020 04:52) (60 - 81)  BP: 149/97 (2020 04:52) (144/99 - 155/96)  BP(mean): --  ABP: --  ABP(mean): --  RR: 20 (2020 04:52) (17 - 20)  SpO2: 98% (2020 04:52) (95% - 98%)          I&O's Detail      PHYSICAL EXAM:    GENERAL: NAD, well-groomed, well-developed  HEAD:  Atraumatic, Normocephalic  EYES: EOMI, PERRLA, conjunctiva and sclera clear  ENMT: No tonsillar erythema, exudates, or enlargement; Moist mucous membranes, Good dentition, No lesions  NECK: Supple, No JVD, Normal thyroid  NERVOUS SYSTEM:  Alert & Oriented X3, Good concentration; Motor Strength 5/5 B/L upper and lower extremities; DTRs 2+ intact and symmetric  CHEST/LUNG: Clear to percussion bilaterally; No rales, rhonchi, wheezing, or rubs  HEART: Regular rate and rhythm; No murmurs, rubs, or gallops  ABDOMEN: Soft, Nontender, Nondistended; Bowel sounds present  EXTREMITIES:  2+ Peripheral Pulses, No clubbing, cyanosis, or edema  LYMPH: No lymphadenopathy noted  SKIN: No rashes or lesions    LABS:  CBC Full  -  ( 2020 19:56 )  WBC Count : 7.90 K/uL  RBC Count : 5.50 M/uL  Hemoglobin : 15.7 g/dL  Hematocrit : 47.2 %  Platelet Count - Automated : 287 K/uL  Mean Cell Volume : 85.8 fl  Mean Cell Hemoglobin : 28.5 pg  Mean Cell Hemoglobin Concentration : 33.3 gm/dL  Auto Neutrophil # : 5.22 K/uL  Auto Lymphocyte # : 2.26 K/uL  Auto Monocyte # : 0.35 K/uL  Auto Eosinophil # : 0.01 K/uL  Auto Basophil # : 0.05 K/uL  Auto Neutrophil % : 66.2 %  Auto Lymphocyte % : 28.6 %  Auto Monocyte % : 4.4 %  Auto Eosinophil % : 0.1 %  Auto Basophil % : 0.6 %        138  |  96  |  13  ----------------------------<  88  3.8   |  20<L>  |  1.10    Ca    9.5      2020 20:16    TPro  8.9<H>  /  Alb  4.4  /  TBili  0.7  /  DBili  x   /  AST  65<H>  /  ALT  51  /  AlkPhos  54      CAPILLARY BLOOD GLUCOSE      POCT Blood Glucose.: 85 mg/dL (2020 18:17)                EKG:    ECHO, US:    RADIOLOGY:    CRITICAL CARE TIME SPENT: Patient is a 54y old  Male who presents with a chief complaint of epigastric pain, withdrawal (2020 00:55)      HPI:  54M PMH chronic ETOH abuse/dependence with hx of alcohol withdrawal s/p frequent hospital admissions, GERD, HLD, alcoholic gastritis/esophagitis, PUD, hx of hypoxic respiratory failure requiring intubation due to aspiration PNA, staph PNA presents for abdominal pain and shakes.  Patient reports that his last drink was 2 days ago and starting from yesterday he has had shakes and NBNB vomiting.  Patient reports epigastric pain like heartburn that started after he vomited.  Patient denies recent hx of seizures.  Patient has no other complaints. (2020 01:55)      Allergies    No Known Allergies    Intolerances        MEDICATIONS  (STANDING):  atorvastatin 20 milliGRAM(s) Oral at bedtime  dexMEDEtomidine Infusion 0.4 MICROgram(s)/kG/Hr (7.31 mL/Hr) IV Continuous <Continuous>  folic acid 1 milliGRAM(s) Oral daily  heparin  Injectable 5000 Unit(s) SubCutaneous every 12 hours  LORazepam   Injectable 2 milliGRAM(s) IV Push every 6 hours  LORazepam   Injectable 4 milliGRAM(s) IV Push once  metoprolol tartrate 25 milliGRAM(s) Oral two times a day  multivitamin 1 Tablet(s) Oral daily  pantoprazole  Injectable 40 milliGRAM(s) IV Push two times a day  PHENobarbital Injectable 260 milliGRAM(s) IV Push once  thiamine 100 milliGRAM(s) Oral daily    MEDICATIONS  (PRN):  HYDROmorphone  Injectable 1 milliGRAM(s) IV Push every 6 hours PRN Severe Pain (7 - 10)  LORazepam   Injectable 2 milliGRAM(s) IV Push every 1 hour PRN alc withdrawal  ondansetron Injectable 8 milliGRAM(s) IV Push every 6 hours PRN Nausea and/or Vomiting      Daily Height in cm: 170.18 (2020 01:38)    Daily Weight in k.1 (:38)    Drug Dosing Weight  Height (cm): 170.2 (:38)  Weight (kg): 73.1 (:38)  BMI (kg/m2): 25.2 (:38)  BSA (m2): 1.84 (:38)    PAST MEDICAL & SURGICAL HISTORY:  DTs (delirium tremens)  Substance abuse  Gastritis  EtOH dependence  Mixed hyperlipidemia  PUD (peptic ulcer disease)  No significant past surgical history      FAMILY HISTORY:  No pertinent family history in first degree relatives (Father, Mother)      SOCIAL HISTORY:    ADVANCE DIRECTIVES:    REVIEW OF SYSTEMS:    CONSTITUTIONAL: No fever, weight loss, or fatigue  EYES: No eye pain, visual disturbances, or discharge  ENMT:  No difficulty hearing, tinnitus, vertigo; No sinus or throat pain  NECK: No pain or stiffness  BREASTS: No pain, masses, or nipple discharge  RESPIRATORY: No cough, wheezing, chills or hemoptysis; No shortness of breath  CARDIOVASCULAR: No chest pain, palpitations, dizziness, or leg swelling  GASTROINTESTINAL: No abdominal or epigastric pain. No nausea, vomiting, or hematemesis; No diarrhea or constipation. No melena or hematochezia.  GENITOURINARY: No dysuria, frequency, hematuria, or incontinence  NEUROLOGICAL: No headaches, memory loss, loss of strength, numbness, or tremors  SKIN: No itching, burning, rashes, or lesions   LYMPH NODES: No enlarged glands  ENDOCRINE: No heat or cold intolerance; No hair loss  MUSCULOSKELETAL: No joint pain or swelling; No muscle, back, or extremity pain  PSYCHIATRIC: No depression, anxiety, mood swings, or difficulty sleeping  HEME/LYMPH: No easy bruising, or bleeding gums  ALLERGY AND IMMUNOLOGIC: No hives or eczema          ICU Vital Signs Last 24 Hrs  T(C): 36.6 (2020 01:38), Max: 37.2 (2020 22:04)  T(F): 97.9 (2020 01:38), Max: 98.9 (2020 22:04)  HR: 81 (2020 04:52) (60 - 81)  BP: 149/97 (2020 04:52) (144/99 - 155/96)  BP(mean): --  ABP: --  ABP(mean): --  RR: 20 (2020 04:52) (17 - 20)  SpO2: 98% (2020 04:52) (95% - 98%)          I&O's Detail      PHYSICAL EXAM:    GENERAL: NAD, well-groomed, well-developed  HEAD:  Atraumatic, Normocephalic  EYES: EOMI, PERRLA, conjunctiva and sclera clear  ENMT: No tonsillar erythema, exudates, or enlargement; Moist mucous membranes, Good dentition, No lesions  NECK: Supple, No JVD, Normal thyroid  NERVOUS SYSTEM:  Alert & Oriented X3, Good concentration; Motor Strength 5/5 B/L upper and lower extremities; DTRs 2+ intact and symmetric  CHEST/LUNG: Clear to percussion bilaterally; No rales, rhonchi, wheezing, or rubs  HEART: Regular rate and rhythm; No murmurs, rubs, or gallops  ABDOMEN: Soft, Nontender, Nondistended; Bowel sounds present  EXTREMITIES:  2+ Peripheral Pulses, No clubbing, cyanosis, or edema  LYMPH: No lymphadenopathy noted  SKIN: No rashes or lesions    LABS:  CBC Full  -  ( 2020 19:56 )  WBC Count : 7.90 K/uL  RBC Count : 5.50 M/uL  Hemoglobin : 15.7 g/dL  Hematocrit : 47.2 %  Platelet Count - Automated : 287 K/uL  Mean Cell Volume : 85.8 fl  Mean Cell Hemoglobin : 28.5 pg  Mean Cell Hemoglobin Concentration : 33.3 gm/dL  Auto Neutrophil # : 5.22 K/uL  Auto Lymphocyte # : 2.26 K/uL  Auto Monocyte # : 0.35 K/uL  Auto Eosinophil # : 0.01 K/uL  Auto Basophil # : 0.05 K/uL  Auto Neutrophil % : 66.2 %  Auto Lymphocyte % : 28.6 %  Auto Monocyte % : 4.4 %  Auto Eosinophil % : 0.1 %  Auto Basophil % : 0.6 %        138  |  96  |  13  ----------------------------<  88  3.8   |  20<L>  |  1.10    Ca    9.5      2020 20:16    TPro  8.9<H>  /  Alb  4.4  /  TBili  0.7  /  DBili  x   /  AST  65<H>  /  ALT  51  /  AlkPhos  54      CAPILLARY BLOOD GLUCOSE      POCT Blood Glucose.: 85 mg/dL (2020 18:17)                EKG:    ECHO, US:    RADIOLOGY:    CRITICAL CARE TIME SPENT: Patient is a 54y old  Male who presents with a chief complaint of epigastric pain, withdrawal (2020 00:55)      HPI:  54M PMH chronic ETOH abuse/dependence with hx of alcohol withdrawal s/p frequent hospital admissions, GERD, HLD, alcoholic gastritis/esophagitis, PUD, hx of hypoxic respiratory failure requiring intubation due to aspiration PNA, staph PNA presents for abdominal pain and shakes.  Patient reports that his last drink was 2 days ago and starting from yesterday he has had shakes and NBNB vomiting.  Patient reports epigastric pain like heartburn that started after he vomited.  Patient denies recent hx of seizures.  Patient has no other complaints. (2020 01:55)    Admitted to medicine service  for alcohol gastritis and alcohol withdrawal. Pt today with worsening agitation, confused, wandering hallway attempting to run from 1:1 and security, required restraints once back in room for severe agitation, self d/red IVs. CIWA 26 with hallucinations, hand tremors. Given 16mg ativan since midnight, phenobarbital 130mg IVP, phenobarbital 260mg IVP without significant improvement in agitation. Transferred to ICU for Delirium tremens/alcohol withdrawal requiring sedation.      Allergies: No Known Allergies      MEDICATIONS  (STANDING):  atorvastatin 20 milliGRAM(s) Oral at bedtime  dexMEDEtomidine Infusion 0.4 MICROgram(s)/kG/Hr (7.31 mL/Hr) IV Continuous <Continuous>  folic acid 1 milliGRAM(s) Oral daily  heparin  Injectable 5000 Unit(s) SubCutaneous every 12 hours  LORazepam   Injectable 2 milliGRAM(s) IV Push every 6 hours  LORazepam   Injectable 4 milliGRAM(s) IV Push once  metoprolol tartrate 25 milliGRAM(s) Oral two times a day  multivitamin 1 Tablet(s) Oral daily  pantoprazole  Injectable 40 milliGRAM(s) IV Push two times a day  PHENobarbital Injectable 260 milliGRAM(s) IV Push once  thiamine 100 milliGRAM(s) Oral daily    MEDICATIONS  (PRN):  HYDROmorphone  Injectable 1 milliGRAM(s) IV Push every 6 hours PRN Severe Pain (7 - 10)  LORazepam   Injectable 2 milliGRAM(s) IV Push every 1 hour PRN alc withdrawal  ondansetron Injectable 8 milliGRAM(s) IV Push every 6 hours PRN Nausea and/or Vomiting      Daily Height in cm: 170.18 (:38)    Daily Weight in k.1 (:38)    Drug Dosing Weight  Height (cm): 170.2 (:38)  Weight (kg): 73.1 (:38)  BMI (kg/m2): 25.2 (:38)  BSA (m2): 1.84 (:38)    PAST MEDICAL & SURGICAL HISTORY:  DTs (delirium tremens)  Substance abuse  Gastritis  EtOH dependence  Mixed hyperlipidemia  PUD (peptic ulcer disease)  No significant past surgical history      FAMILY HISTORY:  No pertinent family history in first degree relatives (Father, Mother)      SOCIAL HISTORY:  silas drinking    ADVANCE DIRECTIVES:  full code    REVIEW OF SYSTEMS:  [x] unable to obtain due to delirium tremens (pt currently not speaking english and even with  is "not making sense")        ICU Vital Signs Last 24 Hrs  T(C): 36.6 (:38), Max: 37.2 (2020 22:04)  T(F): 97.9 (:38), Max: 98.9 (2020 22:04)  HR: 81 (2020 04:52) (60 - 81)  BP: 149/97 (2020 04:52) (144/99 - 155/96)  RR: 20 (2020 04:52) (17 - 20)  SpO2: 98% (2020 04:52) (95% - 98%)          PHYSICAL EXAM:    GENERAL: agitated, attempting to disrobe, bilateral hand tremors, moderately combative  HEAD:  Atraumatic, Normocephalic  EYES: EOMI, PERRLA, conjunctiva and sclera clear  NECK: Supple, No JVD  NERVOUS SYSTEM:  agitated, swinging arms and legs over bed rails, confused  CHEST/LUNG: Clear to auscultation bilaterally; No rales, rhonchi, wheezing  HEART: Regular rate and rhythm  ABDOMEN: Soft, Nontender, Nondistended; Bowel sounds present  EXTREMITIES:  2+ Peripheral Pulses, No clubbing, cyanosis, or edema      LABS:  CBC Full  -  ( 2020 19:56 )  WBC Count : 7.90 K/uL  RBC Count : 5.50 M/uL  Hemoglobin : 15.7 g/dL  Hematocrit : 47.2 %  Platelet Count - Automated : 287 K/uL  Mean Cell Volume : 85.8 fl  Mean Cell Hemoglobin : 28.5 pg  Mean Cell Hemoglobin Concentration : 33.3 gm/dL  Auto Neutrophil # : 5.22 K/uL  Auto Lymphocyte # : 2.26 K/uL  Auto Monocyte # : 0.35 K/uL  Auto Eosinophil # : 0.01 K/uL  Auto Basophil # : 0.05 K/uL  Auto Neutrophil % : 66.2 %  Auto Lymphocyte % : 28.6 %  Auto Monocyte % : 4.4 %  Auto Eosinophil % : 0.1 %  Auto Basophil % : 0.6 %            138  |  96  |  13  ----------------------------<  88  3.8   |  20<L>  |  1.10    Ca    9.5      2020 20:16    TPro  8.9<H>  /  Alb  4.4  /  TBili  0.7  /  AST  65<H>  /  ALT  51  /  AlkPhos  54          CAPILLARY BLOOD GLUCOSE  POCT Blood Glucose.: 85 mg/dL (2020 18:17)        RADIOLOGY:  CXR < from: Xray Chest 1 View-PORTABLE IMMEDIATE (20 @ 00:16) >  No acute pulmonary process demonstrated.      CT abdomen   1. Unremarkable CT appearance to the pancreas.    2. Redemonstration of hepatic steatosis.    3. Suggestion of wall thickening of the distal esophagus for which esophagitis can be considered.      CRITICAL CARE TIME SPENT: 60 min

## 2020-01-09 NOTE — PROGRESS NOTE ADULT - SUBJECTIVE AND OBJECTIVE BOX
Notified pt wants to leave AMA, has son at bedside. Pt confused and tremulous; has multiple admissions for alcohol withdrawal. Pt cannot be discharged, as he is in alcohol withdrawal. Pt to receive ativan stat, CIWA protocol started and to be placed on 1:1 observation. Son at bedside informed.

## 2020-01-09 NOTE — PATIENT PROFILE ADULT - NSASFUNCLEVELADLTRANSFER_GEN_A_NUR
Please call patient.  Recently seen for annual gyn exam.  Had some elevations of her lipid panel.  I recommended a f/u study.    This shows continued elevations of her LDL (164) and her triglycerides (169).  Her HDL is on the low normal side at 59 (we like to see if above 50), so this elevates her overall cardiac risk at 4.7 (should be below 4.5).      She may want to consider a Cardiac CT Calcium Scoring scan, which will show her relative risk for atherosclerotic placques.  This can let her know if statin therapy would be helpful.  It is an out of pocket study that costs $40 and can be useful. WE can order this for her, if desired.     Starting fish oil tablets to try and raise her HDL would be beneficial, as well. (I would recommend the \"burpless\" kind). Thanks.  
RN phoned patient.  Reviewed results  Patient will consider.  Patient does exercise when able as she does have vertigo.  Will continue to monitor and try diet changes.    Patient would like results mailed to her  RN will do this  Encounter closed    
2 = assistive person

## 2020-01-10 LAB
ALBUMIN SERPL ELPH-MCNC: 3.1 G/DL — LOW (ref 3.3–5)
ALP SERPL-CCNC: 42 U/L — SIGNIFICANT CHANGE UP (ref 40–120)
ALT FLD-CCNC: 62 U/L — SIGNIFICANT CHANGE UP (ref 12–78)
ANION GAP SERPL CALC-SCNC: 6 MMOL/L — SIGNIFICANT CHANGE UP (ref 5–17)
ANION GAP SERPL CALC-SCNC: 7 MMOL/L — SIGNIFICANT CHANGE UP (ref 5–17)
APPEARANCE UR: ABNORMAL
AST SERPL-CCNC: 85 U/L — HIGH (ref 15–37)
BACTERIA # UR AUTO: ABNORMAL
BILIRUB SERPL-MCNC: 1.2 MG/DL — SIGNIFICANT CHANGE UP (ref 0.2–1.2)
BILIRUB UR-MCNC: ABNORMAL
BUN SERPL-MCNC: 10 MG/DL — SIGNIFICANT CHANGE UP (ref 7–23)
BUN SERPL-MCNC: 11 MG/DL — SIGNIFICANT CHANGE UP (ref 7–23)
CALCIUM SERPL-MCNC: 8.5 MG/DL — SIGNIFICANT CHANGE UP (ref 8.5–10.1)
CALCIUM SERPL-MCNC: 8.6 MG/DL — SIGNIFICANT CHANGE UP (ref 8.5–10.1)
CHLORIDE SERPL-SCNC: 107 MMOL/L — SIGNIFICANT CHANGE UP (ref 96–108)
CHLORIDE SERPL-SCNC: 108 MMOL/L — SIGNIFICANT CHANGE UP (ref 96–108)
CO2 SERPL-SCNC: 24 MMOL/L — SIGNIFICANT CHANGE UP (ref 22–31)
CO2 SERPL-SCNC: 25 MMOL/L — SIGNIFICANT CHANGE UP (ref 22–31)
COLOR SPEC: YELLOW — SIGNIFICANT CHANGE UP
COMMENT - URINE: SIGNIFICANT CHANGE UP
CREAT SERPL-MCNC: 0.56 MG/DL — SIGNIFICANT CHANGE UP (ref 0.5–1.3)
CREAT SERPL-MCNC: 0.63 MG/DL — SIGNIFICANT CHANGE UP (ref 0.5–1.3)
DIFF PNL FLD: ABNORMAL
GLUCOSE SERPL-MCNC: 97 MG/DL — SIGNIFICANT CHANGE UP (ref 70–99)
GLUCOSE SERPL-MCNC: 98 MG/DL — SIGNIFICANT CHANGE UP (ref 70–99)
GLUCOSE UR QL: NEGATIVE MG/DL — SIGNIFICANT CHANGE UP
HCT VFR BLD CALC: 39.5 % — SIGNIFICANT CHANGE UP (ref 39–50)
HGB BLD-MCNC: 12.6 G/DL — LOW (ref 13–17)
KETONES UR-MCNC: ABNORMAL
LACTATE SERPL-SCNC: 0.7 MMOL/L — SIGNIFICANT CHANGE UP (ref 0.7–2)
LEUKOCYTE ESTERASE UR-ACNC: ABNORMAL
MAGNESIUM SERPL-MCNC: 2.2 MG/DL — SIGNIFICANT CHANGE UP (ref 1.6–2.6)
MAGNESIUM SERPL-MCNC: 2.3 MG/DL — SIGNIFICANT CHANGE UP (ref 1.6–2.6)
MCHC RBC-ENTMCNC: 28.4 PG — SIGNIFICANT CHANGE UP (ref 27–34)
MCHC RBC-ENTMCNC: 31.9 GM/DL — LOW (ref 32–36)
MCV RBC AUTO: 89 FL — SIGNIFICANT CHANGE UP (ref 80–100)
NITRITE UR-MCNC: NEGATIVE — SIGNIFICANT CHANGE UP
NRBC # BLD: 0 /100 WBCS — SIGNIFICANT CHANGE UP (ref 0–0)
PH UR: 7 — SIGNIFICANT CHANGE UP (ref 5–8)
PHENOBARB SERPL-MCNC: 34.7 UG/ML — SIGNIFICANT CHANGE UP (ref 15–40)
PHOSPHATE SERPL-MCNC: 2.5 MG/DL — SIGNIFICANT CHANGE UP (ref 2.5–4.5)
PHOSPHATE SERPL-MCNC: 2.6 MG/DL — SIGNIFICANT CHANGE UP (ref 2.5–4.5)
PLATELET # BLD AUTO: 139 K/UL — LOW (ref 150–400)
POTASSIUM SERPL-MCNC: 3.4 MMOL/L — LOW (ref 3.5–5.3)
POTASSIUM SERPL-MCNC: 3.4 MMOL/L — LOW (ref 3.5–5.3)
POTASSIUM SERPL-SCNC: 3.4 MMOL/L — LOW (ref 3.5–5.3)
POTASSIUM SERPL-SCNC: 3.4 MMOL/L — LOW (ref 3.5–5.3)
PROT SERPL-MCNC: 6.7 GM/DL — SIGNIFICANT CHANGE UP (ref 6–8.3)
PROT UR-MCNC: 15 MG/DL
RBC # BLD: 4.44 M/UL — SIGNIFICANT CHANGE UP (ref 4.2–5.8)
RBC # FLD: 12.3 % — SIGNIFICANT CHANGE UP (ref 10.3–14.5)
RBC CASTS # UR COMP ASSIST: SIGNIFICANT CHANGE UP /HPF (ref 0–4)
SODIUM SERPL-SCNC: 138 MMOL/L — SIGNIFICANT CHANGE UP (ref 135–145)
SODIUM SERPL-SCNC: 139 MMOL/L — SIGNIFICANT CHANGE UP (ref 135–145)
SP GR SPEC: 1.01 — SIGNIFICANT CHANGE UP (ref 1.01–1.02)
UROBILINOGEN FLD QL: 8 MG/DL
WBC # BLD: 3.98 K/UL — SIGNIFICANT CHANGE UP (ref 3.8–10.5)
WBC # FLD AUTO: 3.98 K/UL — SIGNIFICANT CHANGE UP (ref 3.8–10.5)
WBC UR QL: SIGNIFICANT CHANGE UP

## 2020-01-10 PROCEDURE — 99291 CRITICAL CARE FIRST HOUR: CPT

## 2020-01-10 RX ORDER — TAMSULOSIN HYDROCHLORIDE 0.4 MG/1
0.4 CAPSULE ORAL AT BEDTIME
Refills: 0 | Status: DISCONTINUED | OUTPATIENT
Start: 2020-01-10 | End: 2020-01-15

## 2020-01-10 RX ORDER — POTASSIUM CHLORIDE 20 MEQ
10 PACKET (EA) ORAL
Refills: 0 | Status: COMPLETED | OUTPATIENT
Start: 2020-01-10 | End: 2020-01-10

## 2020-01-10 RX ADMIN — Medication 100 MILLIEQUIVALENT(S): at 06:00

## 2020-01-10 RX ADMIN — ATORVASTATIN CALCIUM 20 MILLIGRAM(S): 80 TABLET, FILM COATED ORAL at 22:25

## 2020-01-10 RX ADMIN — DEXMEDETOMIDINE HYDROCHLORIDE IN 0.9% SODIUM CHLORIDE 7.31 MICROGRAM(S)/KG/HR: 4 INJECTION INTRAVENOUS at 06:21

## 2020-01-10 RX ADMIN — Medication 100 MILLIEQUIVALENT(S): at 08:12

## 2020-01-10 RX ADMIN — PANTOPRAZOLE SODIUM 40 MILLIGRAM(S): 20 TABLET, DELAYED RELEASE ORAL at 06:21

## 2020-01-10 RX ADMIN — PANTOPRAZOLE SODIUM 40 MILLIGRAM(S): 20 TABLET, DELAYED RELEASE ORAL at 17:42

## 2020-01-10 RX ADMIN — Medication 260 MILLIGRAM(S): at 12:41

## 2020-01-10 RX ADMIN — Medication 260 MILLIGRAM(S): at 05:30

## 2020-01-10 RX ADMIN — DEXMEDETOMIDINE HYDROCHLORIDE IN 0.9% SODIUM CHLORIDE 7.31 MICROGRAM(S)/KG/HR: 4 INJECTION INTRAVENOUS at 08:12

## 2020-01-10 RX ADMIN — Medication 260 MILLIGRAM(S): at 01:16

## 2020-01-10 RX ADMIN — DEXMEDETOMIDINE HYDROCHLORIDE IN 0.9% SODIUM CHLORIDE 7.31 MICROGRAM(S)/KG/HR: 4 INJECTION INTRAVENOUS at 11:39

## 2020-01-10 RX ADMIN — DEXMEDETOMIDINE HYDROCHLORIDE IN 0.9% SODIUM CHLORIDE 7.31 MICROGRAM(S)/KG/HR: 4 INJECTION INTRAVENOUS at 16:00

## 2020-01-10 RX ADMIN — ENOXAPARIN SODIUM 40 MILLIGRAM(S): 100 INJECTION SUBCUTANEOUS at 11:39

## 2020-01-10 RX ADMIN — TAMSULOSIN HYDROCHLORIDE 0.4 MILLIGRAM(S): 0.4 CAPSULE ORAL at 22:25

## 2020-01-10 RX ADMIN — Medication 260 MILLIGRAM(S): at 17:42

## 2020-01-10 RX ADMIN — DEXMEDETOMIDINE HYDROCHLORIDE IN 0.9% SODIUM CHLORIDE 7.31 MICROGRAM(S)/KG/HR: 4 INJECTION INTRAVENOUS at 22:25

## 2020-01-10 RX ADMIN — SODIUM CHLORIDE 75 MILLILITER(S): 9 INJECTION, SOLUTION INTRAVENOUS at 10:00

## 2020-01-10 RX ADMIN — SODIUM CHLORIDE 75 MILLILITER(S): 9 INJECTION, SOLUTION INTRAVENOUS at 22:25

## 2020-01-10 NOTE — PROGRESS NOTE ADULT - SUBJECTIVE AND OBJECTIVE BOX
INTERVAL HPI/OVERNIGHT EVENTS:     Urinary retention overnight s/p straight cath with 300cc urine.  Noted to have agitation, increased Precedex to 1.5.  No PRN was given.      CENTRAL LINE: [ ] YES [x ] NO  LOCATION:   DATE INSERTED:  REMOVE: [ ] YES [ ] NO  EXPLAIN:    CHAN: [ ] YES [x ] NO    DATE INSERTED:  REMOVE:  [ ] YES [ ] NO  EXPLAIN:    A-LINE:  [ ] YES [x ] NO  LOCATION:   DATE INSERTED:  REMOVE:  [ ] YES [ ] NO  EXPLAIN:    GLOBAL ISSUE/BEST PRACTICE:  Analgesia: NA  Sedation: Precedex  HOB elevation: yes  Stress ulcer prophylaxis: Protonix  VTE prophylaxis: Lovenox  Oral Care: NA  Glycemic control: NA  Nutrition: Regular    REVIEW OF SYSTEMS: [x] Unable to obtain because: delirium tremens     ICU Vital Signs Last 24 Hrs  T(C): 35.9 (10 Brad 2020 04:33), Max: 38.3 (09 Jan 2020 11:00)  T(F): 96.7 (10 Brad 2020 04:33), Max: 100.9 (09 Jan 2020 11:00)  HR: 74 (10 Brad 2020 08:00) (58 - 111)  BP: 136/95 (10 Brad 2020 08:00) (90/63 - 139/101)  BP(mean): 108 (10 Brad 2020 08:00) (73 - 112)  ABP: --  ABP(mean): --  RR: 15 (10 Brad 2020 08:00) (12 - 24)  SpO2: 98% (10 Brad 2020 08:00) (96% - 100%)      I&O's Detail    09 Jan 2020 07:01  -  10 Brad 2020 07:00  --------------------------------------------------------  IN:    dexmedetomidine Infusion: 246.4 mL    IV PiggyBack: 250 mL    lactated ringers.: 900 mL  Total IN: 1396.4 mL    OUT:    Post-Void Residual per Intermittent Catheterization: 300 mL  Total OUT: 300 mL    Total NET: 1096.4 mL              MEDICATIONS  NEURO  Meds: dexMEDEtomidine Infusion 0.4 MICROgram(s)/kG/Hr (7.31 mL/Hr) IV Continuous <Continuous>  ondansetron Injectable 8 milliGRAM(s) IV Push every 6 hours PRN Nausea and/or Vomiting  PHENobarbital Injectable 260 milliGRAM(s) IV Push every 6 hours  PHENobarbital Injectable 130 milliGRAM(s) IV Push every 1 hour PRN CIWA >8-11    RESPIRATORY  ABG - ( 10 Brad 2020 03:28 )  pH: x     /  pCO2: x     /  pO2: x     / HCO3: x     / Base Excess: x     /  SaO2: x       Lactate: 0.7      Meds:   CARDIOVASCULAR  Meds:   GI/NUTRITION  Meds: pantoprazole  Injectable 40 milliGRAM(s) IV Push two times a day    GENITOURINARY  Meds: folic acid 1 milliGRAM(s) Oral daily  lactated ringers. 1000 milliLiter(s) IV Continuous <Continuous>  multivitamin 1 Tablet(s) Oral daily  thiamine 100 milliGRAM(s) Oral daily    HEMATOLOGIC  Meds: enoxaparin Injectable 40 milliGRAM(s) SubCutaneous daily    [x] VTE Prophylaxis  INFECTIOUS DISEASES  Meds:   ENDOCRINE  CAPILLARY BLOOD GLUCOSE        Meds: atorvastatin 20 milliGRAM(s) Oral at bedtime    OTHER MEDICATIONS:  chlorhexidine 4% Liquid 1 Application(s) Topical <User Schedule>  :    PHYSICAL EXAM:    GENERAL: NAD,   HEAD:  Atraumatic, Normocephalic  EYES: EOMI, PERRLA, conjunctiva and sclera clear  NECK: Supple, No JVD, Normal thyroid  CHEST/LUNG: Clear to auscultation bilaterally; No rales, rhonchi, wheezing, or rubs  HEART: Regular rate and rhythm; No murmurs, rubs, or gallops  ABDOMEN: Soft, Nontender, Nondistended; Bowel sounds present  EXTREMITIES:  2+ Peripheral Pulses, No clubbing, cyanosis, or edema  SKIN: No rashes or lesions  NERVOUS SYSTEM:  DELIRIOUS, 	REQUIRING PRECEDEX FOR SEVERE AGITATION; now calm and sleeping on precedex.                         12.6   3.98  )-----------( 139      ( 10 Brad 2020 03:27 )             39.5      01-10    138  |  108  |  10  ----------------------------<  98  3.4<L>   |  24  |  0.56    Ca    8.5      10 Brad 2020 03:27  Phos  2.5     01-10  Mg     2.2     01-10    TPro  6.7  /  Alb  3.1<L>  /  TBili  1.2  /  DBili  x   /  AST  85<H>  /  ALT  62  /  AlkPhos  42  01-10    RADIOLOGY & ADDITIONAL STUDIES: INTERVAL HPI/OVERNIGHT EVENTS:     Urinary retention overnight s/p straight cath with 300cc urine.  Noted to have agitation, increased Precedex to 1.5.  No PRN was given.      CENTRAL LINE: [ ] YES [x ] NO  LOCATION:   DATE INSERTED:  REMOVE: [ ] YES [ ] NO  EXPLAIN:  	  CHAN: [ ] YES [x ] NO    DATE INSERTED:  REMOVE:  [ ] YES [ ] NO  EXPLAIN:    A-LINE:  [ ] YES [x ] NO  LOCATION:   DATE INSERTED:  REMOVE:  [ ] YES [ ] NO  EXPLAIN:    GLOBAL ISSUE/BEST PRACTICE:  Analgesia: NA  Sedation: Precedex  HOB elevation: yes  Stress ulcer prophylaxis: Protonix  VTE prophylaxis: Lovenox  Oral Care: NA  Glycemic control: NA  Nutrition: Regular    REVIEW OF SYSTEMS: [x] Unable to obtain because: delirium tremens     ICU Vital Signs Last 24 Hrs  T(C): 35.9 (10 Brad 2020 04:33), Max: 38.3 (09 Jan 2020 11:00)  T(F): 96.7 (10 Brad 2020 04:33), Max: 100.9 (09 Jan 2020 11:00)  HR: 74 (10 Brad 2020 08:00) (58 - 111)  BP: 136/95 (10 Brad 2020 08:00) (90/63 - 139/101)  BP(mean): 108 (10 Brad 2020 08:00) (73 - 112)  ABP: --  ABP(mean): --  RR: 15 (10 Brad 2020 08:00) (12 - 24)  SpO2: 98% (10 Brad 2020 08:00) (96% - 100%)      I&O's Detail    09 Jan 2020 07:01  -  10 Brad 2020 07:00  --------------------------------------------------------  IN:    dexmedetomidine Infusion: 246.4 mL    IV PiggyBack: 250 mL    lactated ringers.: 900 mL  Total IN: 1396.4 mL    OUT:    Post-Void Residual per Intermittent Catheterization: 300 mL  Total OUT: 300 mL    Total NET: 1096.4 mL              MEDICATIONS  NEURO  Meds: dexMEDEtomidine Infusion 0.4 MICROgram(s)/kG/Hr (7.31 mL/Hr) IV Continuous <Continuous>  ondansetron Injectable 8 milliGRAM(s) IV Push every 6 hours PRN Nausea and/or Vomiting  PHENobarbital Injectable 260 milliGRAM(s) IV Push every 6 hours  PHENobarbital Injectable 130 milliGRAM(s) IV Push every 1 hour PRN CIWA >8-11    RESPIRATORY  ABG - ( 10 Brad 2020 03:28 )  pH: x     /  pCO2: x     /  pO2: x     / HCO3: x     / Base Excess: x     /  SaO2: x       Lactate: 0.7      Meds:   CARDIOVASCULAR  Meds:   GI/NUTRITION  Meds: pantoprazole  Injectable 40 milliGRAM(s) IV Push two times a day    GENITOURINARY  Meds: folic acid 1 milliGRAM(s) Oral daily  lactated ringers. 1000 milliLiter(s) IV Continuous <Continuous>  multivitamin 1 Tablet(s) Oral daily  thiamine 100 milliGRAM(s) Oral daily    HEMATOLOGIC  Meds: enoxaparin Injectable 40 milliGRAM(s) SubCutaneous daily    [x] VTE Prophylaxis  INFECTIOUS DISEASES  Meds:   ENDOCRINE  CAPILLARY BLOOD GLUCOSE        Meds: atorvastatin 20 milliGRAM(s) Oral at bedtime    OTHER MEDICATIONS:  chlorhexidine 4% Liquid 1 Application(s) Topical <User Schedule>  :    PHYSICAL EXAM:    GENERAL: NAD,   HEAD:  Atraumatic, Normocephalic  EYES: EOMI, PERRLA, conjunctiva and sclera clear  NECK: Supple, No JVD, Normal thyroid  CHEST/LUNG: Clear to auscultation bilaterally; No rales, rhonchi, wheezing, or rubs  HEART: Regular rate and rhythm; No murmurs, rubs, or gallops  ABDOMEN: Soft, Nontender, Nondistended; Bowel sounds present  EXTREMITIES:  2+ Peripheral Pulses, No clubbing, cyanosis, or edema  SKIN: No rashes or lesions  NERVOUS SYSTEM:  DELIRIOUS, 	REQUIRING PRECEDEX FOR SEVERE AGITATION; now calm and sleeping on precedex.                         12.6   3.98  )-----------( 139      ( 10 Brad 2020 03:27 )             39.5      01-10    138  |  108  |  10  ----------------------------<  98  3.4<L>   |  24  |  0.56    Ca    8.5      10 Brad 2020 03:27  Phos  2.5     01-10  Mg     2.2     01-10    TPro  6.7  /  Alb  3.1<L>  /  TBili  1.2  /  DBili  x   /  AST  85<H>  /  ALT  62  /  AlkPhos  42  01-10    RADIOLOGY & ADDITIONAL STUDIES:

## 2020-01-10 NOTE — PROGRESS NOTE ADULT - ASSESSMENT
53 yo M with DTs, and ETOH dependence with withdrawal and multiple admissions, a/w alcoholic gastritis and alcohol withdrawal with course complicated by DTs, found to have CIWA >20 on 1/9/2020 despite ativan taper.  Admitted to ICU.    ETOH Withdrawal  - Phenobarb 260mg IV Q6H; pending level in AM  - Continue with precedex and wean as tolerated  - Monitor electrolytes; Replete electrolyte; K today  - C/w IVF  - Reg diet    Urinary retention s/p straight cath  will start flomax  Straight cath / bladder scan Q6H.

## 2020-01-11 LAB
ALBUMIN SERPL ELPH-MCNC: 2.8 G/DL — LOW (ref 3.3–5)
ALP SERPL-CCNC: 50 U/L — SIGNIFICANT CHANGE UP (ref 40–120)
ALT FLD-CCNC: 56 U/L — SIGNIFICANT CHANGE UP (ref 12–78)
ANION GAP SERPL CALC-SCNC: 11 MMOL/L — SIGNIFICANT CHANGE UP (ref 5–17)
AST SERPL-CCNC: 44 U/L — HIGH (ref 15–37)
BILIRUB SERPL-MCNC: 0.7 MG/DL — SIGNIFICANT CHANGE UP (ref 0.2–1.2)
BUN SERPL-MCNC: 11 MG/DL — SIGNIFICANT CHANGE UP (ref 7–23)
CALCIUM SERPL-MCNC: 8.5 MG/DL — SIGNIFICANT CHANGE UP (ref 8.5–10.1)
CHLORIDE SERPL-SCNC: 109 MMOL/L — HIGH (ref 96–108)
CO2 SERPL-SCNC: 21 MMOL/L — LOW (ref 22–31)
CREAT SERPL-MCNC: 0.66 MG/DL — SIGNIFICANT CHANGE UP (ref 0.5–1.3)
GLUCOSE SERPL-MCNC: 80 MG/DL — SIGNIFICANT CHANGE UP (ref 70–99)
HCT VFR BLD CALC: 41.6 % — SIGNIFICANT CHANGE UP (ref 39–50)
HGB BLD-MCNC: 13.5 G/DL — SIGNIFICANT CHANGE UP (ref 13–17)
LACTATE SERPL-SCNC: 1.1 MMOL/L — SIGNIFICANT CHANGE UP (ref 0.7–2)
MAGNESIUM SERPL-MCNC: 1.9 MG/DL — SIGNIFICANT CHANGE UP (ref 1.6–2.6)
MCHC RBC-ENTMCNC: 28.5 PG — SIGNIFICANT CHANGE UP (ref 27–34)
MCHC RBC-ENTMCNC: 32.5 GM/DL — SIGNIFICANT CHANGE UP (ref 32–36)
MCV RBC AUTO: 87.9 FL — SIGNIFICANT CHANGE UP (ref 80–100)
NRBC # BLD: 0 /100 WBCS — SIGNIFICANT CHANGE UP (ref 0–0)
PHENOBARB SERPL-MCNC: 51.8 UG/ML — CRITICAL HIGH (ref 15–40)
PHOSPHATE SERPL-MCNC: 2.4 MG/DL — LOW (ref 2.5–4.5)
PLATELET # BLD AUTO: 151 K/UL — SIGNIFICANT CHANGE UP (ref 150–400)
POTASSIUM SERPL-MCNC: 3.5 MMOL/L — SIGNIFICANT CHANGE UP (ref 3.5–5.3)
POTASSIUM SERPL-SCNC: 3.5 MMOL/L — SIGNIFICANT CHANGE UP (ref 3.5–5.3)
PROT SERPL-MCNC: 6.8 GM/DL — SIGNIFICANT CHANGE UP (ref 6–8.3)
RBC # BLD: 4.73 M/UL — SIGNIFICANT CHANGE UP (ref 4.2–5.8)
RBC # FLD: 12.1 % — SIGNIFICANT CHANGE UP (ref 10.3–14.5)
SODIUM SERPL-SCNC: 141 MMOL/L — SIGNIFICANT CHANGE UP (ref 135–145)
WBC # BLD: 6.84 K/UL — SIGNIFICANT CHANGE UP (ref 3.8–10.5)
WBC # FLD AUTO: 6.84 K/UL — SIGNIFICANT CHANGE UP (ref 3.8–10.5)

## 2020-01-11 PROCEDURE — 99291 CRITICAL CARE FIRST HOUR: CPT

## 2020-01-11 PROCEDURE — 71045 X-RAY EXAM CHEST 1 VIEW: CPT | Mod: 26

## 2020-01-11 RX ORDER — POTASSIUM PHOSPHATE, MONOBASIC POTASSIUM PHOSPHATE, DIBASIC 236; 224 MG/ML; MG/ML
15 INJECTION, SOLUTION INTRAVENOUS ONCE
Refills: 0 | Status: COMPLETED | OUTPATIENT
Start: 2020-01-11 | End: 2020-01-11

## 2020-01-11 RX ORDER — HALOPERIDOL DECANOATE 100 MG/ML
5 INJECTION INTRAMUSCULAR ONCE
Refills: 0 | Status: COMPLETED | OUTPATIENT
Start: 2020-01-11 | End: 2020-01-11

## 2020-01-11 RX ORDER — HALOPERIDOL DECANOATE 100 MG/ML
0.5 INJECTION INTRAMUSCULAR
Refills: 0 | Status: DISCONTINUED | OUTPATIENT
Start: 2020-01-11 | End: 2020-01-13

## 2020-01-11 RX ORDER — ACETAMINOPHEN 500 MG
650 TABLET ORAL ONCE
Refills: 0 | Status: COMPLETED | OUTPATIENT
Start: 2020-01-11 | End: 2020-01-11

## 2020-01-11 RX ORDER — PHENOBARBITAL 60 MG
130 TABLET ORAL ONCE
Refills: 0 | Status: DISCONTINUED | OUTPATIENT
Start: 2020-01-11 | End: 2020-01-11

## 2020-01-11 RX ADMIN — DEXMEDETOMIDINE HYDROCHLORIDE IN 0.9% SODIUM CHLORIDE 7.31 MICROGRAM(S)/KG/HR: 4 INJECTION INTRAVENOUS at 19:46

## 2020-01-11 RX ADMIN — DEXMEDETOMIDINE HYDROCHLORIDE IN 0.9% SODIUM CHLORIDE 7.31 MICROGRAM(S)/KG/HR: 4 INJECTION INTRAVENOUS at 02:03

## 2020-01-11 RX ADMIN — Medication 650 MILLIGRAM(S): at 17:38

## 2020-01-11 RX ADMIN — Medication 260 MILLIGRAM(S): at 19:42

## 2020-01-11 RX ADMIN — Medication 650 MILLIGRAM(S): at 16:38

## 2020-01-11 RX ADMIN — Medication 1 TABLET(S): at 12:11

## 2020-01-11 RX ADMIN — PANTOPRAZOLE SODIUM 40 MILLIGRAM(S): 20 TABLET, DELAYED RELEASE ORAL at 06:41

## 2020-01-11 RX ADMIN — DEXMEDETOMIDINE HYDROCHLORIDE IN 0.9% SODIUM CHLORIDE 7.31 MICROGRAM(S)/KG/HR: 4 INJECTION INTRAVENOUS at 17:52

## 2020-01-11 RX ADMIN — ATORVASTATIN CALCIUM 20 MILLIGRAM(S): 80 TABLET, FILM COATED ORAL at 22:41

## 2020-01-11 RX ADMIN — HALOPERIDOL DECANOATE 0.5 MILLIGRAM(S): 100 INJECTION INTRAMUSCULAR at 22:42

## 2020-01-11 RX ADMIN — Medication 100 MILLIGRAM(S): at 12:11

## 2020-01-11 RX ADMIN — Medication 130 MILLIGRAM(S): at 15:49

## 2020-01-11 RX ADMIN — Medication 260 MILLIGRAM(S): at 00:28

## 2020-01-11 RX ADMIN — PANTOPRAZOLE SODIUM 40 MILLIGRAM(S): 20 TABLET, DELAYED RELEASE ORAL at 17:51

## 2020-01-11 RX ADMIN — ENOXAPARIN SODIUM 40 MILLIGRAM(S): 100 INJECTION SUBCUTANEOUS at 12:11

## 2020-01-11 RX ADMIN — CHLORHEXIDINE GLUCONATE 1 APPLICATION(S): 213 SOLUTION TOPICAL at 10:15

## 2020-01-11 RX ADMIN — TAMSULOSIN HYDROCHLORIDE 0.4 MILLIGRAM(S): 0.4 CAPSULE ORAL at 22:42

## 2020-01-11 RX ADMIN — HALOPERIDOL DECANOATE 5 MILLIGRAM(S): 100 INJECTION INTRAMUSCULAR at 06:41

## 2020-01-11 RX ADMIN — DEXMEDETOMIDINE HYDROCHLORIDE IN 0.9% SODIUM CHLORIDE 7.31 MICROGRAM(S)/KG/HR: 4 INJECTION INTRAVENOUS at 21:00

## 2020-01-11 RX ADMIN — POTASSIUM PHOSPHATE, MONOBASIC POTASSIUM PHOSPHATE, DIBASIC 62.5 MILLIMOLE(S): 236; 224 INJECTION, SOLUTION INTRAVENOUS at 10:14

## 2020-01-11 RX ADMIN — Medication 260 MILLIGRAM(S): at 06:41

## 2020-01-11 RX ADMIN — Medication 1 MILLIGRAM(S): at 12:11

## 2020-01-11 RX ADMIN — Medication 130 MILLIGRAM(S): at 15:30

## 2020-01-11 NOTE — PROCEDURE NOTE - NSPROCDETAILS_GEN_ALL_CORE
location identified, draped/prepped, sterile technique used/dressing applied/flushes easily/ultrasound utilization/secured in place
blood seen on insertion/ultrasound utilization/flushes easily/location identified, draped/prepped, sterile technique used/dressing applied

## 2020-01-11 NOTE — PROCEDURE NOTE - ULTRASOUND ASSESSMENT OF FLUID/LOCATION
Basilic vein noted to be compressible with no visible clot.  Peripheral IV was visualized going into the basilic vein with blood return and easy flush.
Forearm vein noted to be fully compressible with no visualize clot.  20g IV was inserted with blood return, easily flushes.

## 2020-01-11 NOTE — PROGRESS NOTE ADULT - SUBJECTIVE AND OBJECTIVE BOX
INTERVAL HPI/OVERNIGHT EVENTS:    Patient noted to be agitated overnight, required haldol.    CENTRAL LINE: [ ] YES [x ] NO  LOCATION:   DATE INSERTED:  REMOVE: [ ] YES [ ] NO  EXPLAIN:  	  CHAN: [ x] YES [ ] NO    DATE INSERTED: 1/10/19  REMOVE:  [ ] YES [ ] NO  EXPLAIN:    A-LINE:  [ ] YES [x ] NO  LOCATION:   DATE INSERTED:  REMOVE:  [ ] YES [ ] NO  EXPLAIN:    GLOBAL ISSUE/BEST PRACTICE:  Analgesia: NA  Sedation: Precedex  HOB elevation: yes  Stress ulcer prophylaxis: Protonix  VTE prophylaxis: Lovenox  Oral Care: NA  Glycemic control: NA  Nutrition: Regular    REVIEW OF SYSTEMS: [x] Unable to obtain because: delirium tremens   ICU Vital Signs Last 24 Hrs  T(C): 37.1 (2020 08:00), Max: 37.6 (10 Brad 2020 16:04)  T(F): 98.8 (2020 08:00), Max: 99.6 (10 Brad 2020 16:04)  HR: 78 (2020 10:00) (65 - 97)  BP: 120/96 (2020 10:00) (83/63 - 138/95)  BP(mean): 104 (2020 10:00) (68 - 109)  ABP: --  ABP(mean): --  RR: 16 (2020 10:00) (14 - 26)  SpO2: 97% (2020 10:00) (92% - 100%)      I&O's Detail    10 Brad 2020 07:01  -  2020 07:00  --------------------------------------------------------  IN:    dexmedetomidine Infusion: 339.6 mL    lactated ringers.: 1800 mL  Total IN: 2139.6 mL    OUT:    Indwelling Catheter - Urethral: 1450 mL  Total OUT: 1450 mL    Total NET: 689.6 mL      2020 07:01  -  2020 11:01  --------------------------------------------------------  IN:    dexmedetomidine Infusion: 43.8 mL    IV PiggyBack: 250 mL    lactated ringers.: 225 mL  Total IN: 518.8 mL    OUT:    Indwelling Catheter - Urethral: 100 mL  Total OUT: 100 mL    Total NET: 418.8 mL              MEDICATIONS  NEURO  Meds: dexMEDEtomidine Infusion 0.4 MICROgram(s)/kG/Hr (7.31 mL/Hr) IV Continuous <Continuous>  ondansetron Injectable 8 milliGRAM(s) IV Push every 6 hours PRN Nausea and/or Vomiting  PHENobarbital Injectable 260 milliGRAM(s) IV Push every 6 hours  PHENobarbital Injectable 130 milliGRAM(s) IV Push every 1 hour PRN CIWA >8-11    RESPIRATORY  ABG - ( 2020 07:13 )  pH: x     /  pCO2: x     /  pO2: x     / HCO3: x     / Base Excess: x     /  SaO2: x       Lactate: 1.1              Meds:   CARDIOVASCULAR  Meds: tamsulosin 0.4 milliGRAM(s) Oral at bedtime    GI/NUTRITION  Meds: pantoprazole  Injectable 40 milliGRAM(s) IV Push two times a day    GENITOURINARY  Meds: folic acid 1 milliGRAM(s) Oral daily  lactated ringers. 1000 milliLiter(s) IV Continuous <Continuous>  multivitamin 1 Tablet(s) Oral daily  thiamine 100 milliGRAM(s) Oral daily    HEMATOLOGIC  Meds: enoxaparin Injectable 40 milliGRAM(s) SubCutaneous daily    [x] VTE Prophylaxis  INFECTIOUS DISEASES  Meds:   ENDOCRINE  CAPILLARY BLOOD GLUCOSE        Meds: atorvastatin 20 milliGRAM(s) Oral at bedtime    OTHER MEDICATIONS:  chlorhexidine 4% Liquid 1 Application(s) Topical <User Schedule>  :    PHYSICAL EXAM:  GENERAL: NAD, intermittently agitated.  HEAD:  Atraumatic, Normocephalic  NECK: Supple, No JVD, Normal thyroid  CHEST/LUNG: Clear to auscultation bilaterally; No rales, rhonchi, wheezing, or rubs  HEART: Regular rate and rhythm; No murmurs, rubs, or gallops  ABDOMEN: Soft, Nontender, Nondistended; Bowel sounds present  EXTREMITIES:  2+ Peripheral Pulses, No clubbing, cyanosis. + RUE edema, soft, good cap refill distally  SKIN: No rashes or lesions  NERVOUS SYSTEM:  DELIRIOUS, 	REQUIRING PRECEDEX FOR SEVERE AGITATION; now calm and sleeping on precedex.     LABS:                        13.5   6.84  )-----------( 151      ( 2020 07:13 )             41.6      -11    141  |  109<H>  |  11  ----------------------------<  80  3.5   |  21<L>  |  0.66    Ca    8.5      2020 07:13  Phos  2.4       Mg     1.9         TPro  6.8  /  Alb  2.8<L>  /  TBili  0.7  /  DBili  x   /  AST  44<H>  /  ALT  56  /  AlkPhos  50        Urinalysis Basic - ( 10 Brad 2020 16:42 )    Color: Yellow / Appearance: Slightly Turbid / S.010 / pH: x  Gluc: x / Ketone: Moderate  / Bili: Small / Urobili: 8 mg/dL   Blood: x / Protein: 15 mg/dL / Nitrite: Negative   Leuk Esterase: Trace / RBC: 0-2 /HPF / WBC 0-2   Sq Epi: x / Non Sq Epi: x / Bacteria: Occasional

## 2020-01-11 NOTE — DIETITIAN INITIAL EVALUATION ADULT. - PERTINENT MEDS FT
atorvastatin  chlorhexidine 4% Liquid  dexMEDEtomidine Infusion  enoxaparin Injectable  folic acid  lactated ringers.  multivitamin  ondansetron Injectable PRN  pantoprazole  Injectable  PHENobarbital Injectable  PHENobarbital Injectable PRN  tamsulosin  thiamine

## 2020-01-11 NOTE — DIETITIAN INITIAL EVALUATION ADULT. - PERTINENT LABORATORY DATA
01-11 Na 141 mmol/L Glu 80 mg/dL K+ 3.5 mmol/L Cr 0.66 mg/dL BUN 11 mg/dL Phos 2.4 mg/dL<L> Alb 2.8 g/dL<L> PAB n/a   Hgb 13.5 g/dL Hct 41.6 % HgA1C n/a    Glucose, Serum: 80 mg/dL   24Hr FS:

## 2020-01-11 NOTE — PROGRESS NOTE ADULT - ASSESSMENT
55 yo M with DTs, and ETOH dependence with withdrawal and multiple admissions, a/w alcoholic gastritis and alcohol withdrawal with course complicated by DTs, found to have CIWA >20 on 1/9/2020 despite ativan taper.  Admitted to ICU.    ETOH Withdrawal  - Phenobarb 260mg IV Q6H; pending level in AM  - Continue with precedex and wean as tolerated  - Monitor electrolytes; Replete electrolyte; K today  - C/w IVF  - Reg diet    Urinary retention s/p straight cath  - will start flomax  - Smith placed yesterday.

## 2020-01-11 NOTE — DIETITIAN INITIAL EVALUATION ADULT. - OTHER INFO
Pt admitted with epigastric pain, withdrawal. Pt with hx ETOH, alcoholic gastritis & DT's to start NGT feeding 1/11. Last BM ?. Unable to obtain recent diet hx.     UBW obtained from previous dietitian's assessment in EMR.

## 2020-01-12 LAB
ALBUMIN SERPL ELPH-MCNC: 2.6 G/DL — LOW (ref 3.3–5)
ALP SERPL-CCNC: 54 U/L — SIGNIFICANT CHANGE UP (ref 40–120)
ALT FLD-CCNC: 40 U/L — SIGNIFICANT CHANGE UP (ref 12–78)
ANION GAP SERPL CALC-SCNC: 9 MMOL/L — SIGNIFICANT CHANGE UP (ref 5–17)
AST SERPL-CCNC: 28 U/L — SIGNIFICANT CHANGE UP (ref 15–37)
BILIRUB SERPL-MCNC: 0.6 MG/DL — SIGNIFICANT CHANGE UP (ref 0.2–1.2)
BUN SERPL-MCNC: 11 MG/DL — SIGNIFICANT CHANGE UP (ref 7–23)
CALCIUM SERPL-MCNC: 8.7 MG/DL — SIGNIFICANT CHANGE UP (ref 8.5–10.1)
CHLORIDE SERPL-SCNC: 107 MMOL/L — SIGNIFICANT CHANGE UP (ref 96–108)
CO2 SERPL-SCNC: 23 MMOL/L — SIGNIFICANT CHANGE UP (ref 22–31)
CREAT SERPL-MCNC: 0.82 MG/DL — SIGNIFICANT CHANGE UP (ref 0.5–1.3)
GLUCOSE SERPL-MCNC: 84 MG/DL — SIGNIFICANT CHANGE UP (ref 70–99)
HCT VFR BLD CALC: 40.5 % — SIGNIFICANT CHANGE UP (ref 39–50)
HGB BLD-MCNC: 12.8 G/DL — LOW (ref 13–17)
MAGNESIUM SERPL-MCNC: 2.1 MG/DL — SIGNIFICANT CHANGE UP (ref 1.6–2.6)
MCHC RBC-ENTMCNC: 28.1 PG — SIGNIFICANT CHANGE UP (ref 27–34)
MCHC RBC-ENTMCNC: 31.6 GM/DL — LOW (ref 32–36)
MCV RBC AUTO: 88.8 FL — SIGNIFICANT CHANGE UP (ref 80–100)
NRBC # BLD: 0 /100 WBCS — SIGNIFICANT CHANGE UP (ref 0–0)
PHOSPHATE SERPL-MCNC: 2.8 MG/DL — SIGNIFICANT CHANGE UP (ref 2.5–4.5)
PLATELET # BLD AUTO: 152 K/UL — SIGNIFICANT CHANGE UP (ref 150–400)
POTASSIUM SERPL-MCNC: 3.2 MMOL/L — LOW (ref 3.5–5.3)
POTASSIUM SERPL-SCNC: 3.2 MMOL/L — LOW (ref 3.5–5.3)
PROT SERPL-MCNC: 6.8 GM/DL — SIGNIFICANT CHANGE UP (ref 6–8.3)
RBC # BLD: 4.56 M/UL — SIGNIFICANT CHANGE UP (ref 4.2–5.8)
RBC # FLD: 12.4 % — SIGNIFICANT CHANGE UP (ref 10.3–14.5)
SODIUM SERPL-SCNC: 139 MMOL/L — SIGNIFICANT CHANGE UP (ref 135–145)
WBC # BLD: 6.41 K/UL — SIGNIFICANT CHANGE UP (ref 3.8–10.5)
WBC # FLD AUTO: 6.41 K/UL — SIGNIFICANT CHANGE UP (ref 3.8–10.5)

## 2020-01-12 PROCEDURE — 99291 CRITICAL CARE FIRST HOUR: CPT

## 2020-01-12 RX ORDER — POTASSIUM CHLORIDE 20 MEQ
10 PACKET (EA) ORAL
Refills: 0 | Status: COMPLETED | OUTPATIENT
Start: 2020-01-12 | End: 2020-01-12

## 2020-01-12 RX ORDER — ACETAMINOPHEN 500 MG
650 TABLET ORAL EVERY 6 HOURS
Refills: 0 | Status: DISCONTINUED | OUTPATIENT
Start: 2020-01-12 | End: 2020-01-19

## 2020-01-12 RX ORDER — ACETAMINOPHEN 500 MG
650 TABLET ORAL ONCE
Refills: 0 | Status: DISCONTINUED | OUTPATIENT
Start: 2020-01-12 | End: 2020-01-12

## 2020-01-12 RX ORDER — CEFTRIAXONE 500 MG/1
1000 INJECTION, POWDER, FOR SOLUTION INTRAMUSCULAR; INTRAVENOUS EVERY 24 HOURS
Refills: 0 | Status: DISCONTINUED | OUTPATIENT
Start: 2020-01-12 | End: 2020-01-14

## 2020-01-12 RX ADMIN — Medication 1 TABLET(S): at 12:15

## 2020-01-12 RX ADMIN — DEXMEDETOMIDINE HYDROCHLORIDE IN 0.9% SODIUM CHLORIDE 7.31 MICROGRAM(S)/KG/HR: 4 INJECTION INTRAVENOUS at 21:15

## 2020-01-12 RX ADMIN — Medication 260 MILLIGRAM(S): at 00:15

## 2020-01-12 RX ADMIN — CHLORHEXIDINE GLUCONATE 1 APPLICATION(S): 213 SOLUTION TOPICAL at 05:28

## 2020-01-12 RX ADMIN — SODIUM CHLORIDE 75 MILLILITER(S): 9 INJECTION, SOLUTION INTRAVENOUS at 00:15

## 2020-01-12 RX ADMIN — Medication 650 MILLIGRAM(S): at 16:59

## 2020-01-12 RX ADMIN — PANTOPRAZOLE SODIUM 40 MILLIGRAM(S): 20 TABLET, DELAYED RELEASE ORAL at 05:27

## 2020-01-12 RX ADMIN — Medication 100 MILLIEQUIVALENT(S): at 09:17

## 2020-01-12 RX ADMIN — Medication 260 MILLIGRAM(S): at 06:22

## 2020-01-12 RX ADMIN — Medication 260 MILLIGRAM(S): at 17:58

## 2020-01-12 RX ADMIN — Medication 260 MILLIGRAM(S): at 23:02

## 2020-01-12 RX ADMIN — Medication 100 MILLIGRAM(S): at 12:15

## 2020-01-12 RX ADMIN — DEXMEDETOMIDINE HYDROCHLORIDE IN 0.9% SODIUM CHLORIDE 7.31 MICROGRAM(S)/KG/HR: 4 INJECTION INTRAVENOUS at 05:27

## 2020-01-12 RX ADMIN — CEFTRIAXONE 100 MILLIGRAM(S): 500 INJECTION, POWDER, FOR SOLUTION INTRAMUSCULAR; INTRAVENOUS at 22:59

## 2020-01-12 RX ADMIN — DEXMEDETOMIDINE HYDROCHLORIDE IN 0.9% SODIUM CHLORIDE 7.31 MICROGRAM(S)/KG/HR: 4 INJECTION INTRAVENOUS at 00:16

## 2020-01-12 RX ADMIN — DEXMEDETOMIDINE HYDROCHLORIDE IN 0.9% SODIUM CHLORIDE 7.31 MICROGRAM(S)/KG/HR: 4 INJECTION INTRAVENOUS at 16:59

## 2020-01-12 RX ADMIN — Medication 100 MILLIEQUIVALENT(S): at 08:00

## 2020-01-12 RX ADMIN — ENOXAPARIN SODIUM 40 MILLIGRAM(S): 100 INJECTION SUBCUTANEOUS at 12:14

## 2020-01-12 RX ADMIN — SODIUM CHLORIDE 75 MILLILITER(S): 9 INJECTION, SOLUTION INTRAVENOUS at 17:58

## 2020-01-12 RX ADMIN — Medication 650 MILLIGRAM(S): at 17:48

## 2020-01-12 RX ADMIN — Medication 260 MILLIGRAM(S): at 12:15

## 2020-01-12 RX ADMIN — DEXMEDETOMIDINE HYDROCHLORIDE IN 0.9% SODIUM CHLORIDE 7.31 MICROGRAM(S)/KG/HR: 4 INJECTION INTRAVENOUS at 12:15

## 2020-01-12 RX ADMIN — HALOPERIDOL DECANOATE 0.5 MILLIGRAM(S): 100 INJECTION INTRAMUSCULAR at 09:18

## 2020-01-12 RX ADMIN — HALOPERIDOL DECANOATE 0.5 MILLIGRAM(S): 100 INJECTION INTRAMUSCULAR at 17:58

## 2020-01-12 RX ADMIN — ATORVASTATIN CALCIUM 20 MILLIGRAM(S): 80 TABLET, FILM COATED ORAL at 22:59

## 2020-01-12 RX ADMIN — Medication 100 MILLIEQUIVALENT(S): at 06:23

## 2020-01-12 RX ADMIN — PANTOPRAZOLE SODIUM 40 MILLIGRAM(S): 20 TABLET, DELAYED RELEASE ORAL at 17:57

## 2020-01-12 RX ADMIN — Medication 1 MILLIGRAM(S): at 12:15

## 2020-01-12 NOTE — PROGRESS NOTE ADULT - SUBJECTIVE AND OBJECTIVE BOX
INTERVAL HPI/OVERNIGHT EVENTS:    Noted to have occasional agitation overnight.  Weaning off precedex this am.    CENTRAL LINE: [ ] YES [ x] NO  LOCATION:   DATE INSERTED:  REMOVE: [ ] YES [ ] NO  EXPLAIN:    CHAN: [x ] YES [ ] NO    DATE INSERTED: 1/10/20  REMOVE:  [ ] YES [ ] NO  EXPLAIN:    A-LINE:  [ ] YES [ x] NO  LOCATION:   DATE INSERTED:  REMOVE:  [ ] YES [ ] NO  EXPLAIN:    GLOBAL ISSUE/BEST PRACTICE:  Analgesia: NA  Sedation: Precedex  HOB elevation: yes  Stress ulcer prophylaxis: Protonix  VTE prophylaxis: Lovenox  Oral Care: NA  Glycemic control: NA  Nutrition: Regular    REVIEW OF SYSTEMS: [x] Unable to obtain because: delirium tremens     ICU Vital Signs Last 24 Hrs  T(C): 38.1 (12 Jan 2020 15:45), Max: 38.1 (12 Jan 2020 15:45)  T(F): 100.5 (12 Jan 2020 15:45), Max: 100.5 (12 Jan 2020 15:45)  HR: 84 (12 Jan 2020 17:00) (73 - 111)  BP: 142/92 (12 Jan 2020 17:00) (88/59 - 142/92)  BP(mean): 106 (12 Jan 2020 17:00) (66 - 112)  ABP: --  ABP(mean): --  RR: 22 (12 Jan 2020 17:00) (14 - 36)  SpO2: 98% (12 Jan 2020 17:00) (96% - 100%)      I&O's Detail    11 Jan 2020 07:01  -  12 Jan 2020 07:00  --------------------------------------------------------  IN:    dexmedetomidine Infusion: 506.8 mL    Enteral Tube Flush: 120 mL    IV PiggyBack: 350 mL    lactated ringers.: 1800 mL    ns in tub fed vital1: 710 mL  Total IN: 3486.8 mL    OUT:    Indwelling Catheter - Urethral: 1195 mL  Total OUT: 1195 mL    Total NET: 2291.8 mL      12 Jan 2020 07:01  -  12 Jan 2020 18:13  --------------------------------------------------------  IN:    dexmedetomidine Infusion: 169.9 mL    Enteral Tube Flush: 240 mL    IV PiggyBack: 200 mL    lactated ringers.: 750 mL    ns in tub fed vital1: 515 mL  Total IN: 1874.9 mL    OUT:    Indwelling Catheter - Urethral: 515 mL  Total OUT: 515 mL    Total NET: 1359.9 mL    MEDICATIONS  NEURO  Meds: acetaminophen   Tablet .. 650 milliGRAM(s) Oral every 6 hours PRN Temp greater or equal to 38C (100.4F)  dexMEDEtomidine Infusion 0.4 MICROgram(s)/kG/Hr (7.31 mL/Hr) IV Continuous <Continuous>  haloperidol     Tablet 0.5 milliGRAM(s) Oral two times a day  ondansetron Injectable 8 milliGRAM(s) IV Push every 6 hours PRN Nausea and/or Vomiting  PHENobarbital Injectable 260 milliGRAM(s) IV Push every 6 hours  PHENobarbital Injectable 130 milliGRAM(s) IV Push every 1 hour PRN CIWA >8-11    RESPIRATORY  ABG - ( 11 Jan 2020 07:13 )  pH: x     /  pCO2: x     /  pO2: x     / HCO3: x     / Base Excess: x     /  SaO2: x       Lactate: 1.1              Meds:   CARDIOVASCULAR  Meds: tamsulosin 0.4 milliGRAM(s) Oral at bedtime    GI/NUTRITION  Meds: pantoprazole  Injectable 40 milliGRAM(s) IV Push two times a day    GENITOURINARY  Meds: folic acid 1 milliGRAM(s) Oral daily  lactated ringers. 1000 milliLiter(s) IV Continuous <Continuous>  multivitamin 1 Tablet(s) Oral daily  thiamine 100 milliGRAM(s) Oral daily    HEMATOLOGIC  Meds: enoxaparin Injectable 40 milliGRAM(s) SubCutaneous daily    [x] VTE Prophylaxis  INFECTIOUS DISEASES  Meds:   ENDOCRINE  CAPILLARY BLOOD GLUCOSE        Meds: atorvastatin 20 milliGRAM(s) Oral at bedtime    OTHER MEDICATIONS:  chlorhexidine 4% Liquid 1 Application(s) Topical <User Schedule>  :    PHYSICAL EXAM:  GENERAL: NAD, intermittently agitated.  HEAD:  Atraumatic, Normocephalic  NECK: Supple, No JVD, Normal thyroid  CHEST/LUNG: Clear to auscultation bilaterally; No rales, rhonchi, wheezing, or rubs  HEART: Regular rate and rhythm; No murmurs, rubs, or gallops  ABDOMEN: Soft, Nontender, Nondistended; Bowel sounds present  EXTREMITIES:  2+ Peripheral Pulses, No clubbing, cyanosis. + RUE edema, soft decreased from yesterday, good cap refill distally  SKIN: No rashes or lesions  NERVOUS SYSTEM:  DELIRIOUS, 	REQUIRING PRECEDEX FOR SEVERE AGITATION; now calm and sleeping on precedex.     LABS:	                        12.8   6.41  )-----------( 152      ( 12 Jan 2020 04:03 )             40.5      01-12    139  |  107  |  11  ----------------------------<  84  3.2<L>   |  23  |  0.82    Ca    8.7      12 Jan 2020 04:03  Phos  2.8     01-12  Mg     2.1     01-12    TPro  6.8  /  Alb  2.6<L>  /  TBili  0.6  /  DBili  x   /  AST  28  /  ALT  40  /  AlkPhos  54  01-12                  RADIOLOGY & ADDITIONAL STUDIES:

## 2020-01-12 NOTE — PROGRESS NOTE ADULT - ASSESSMENT
55 yo M with DTs, and ETOH dependence with withdrawal and multiple admissions, a/w alcoholic gastritis and alcohol withdrawal with course complicated by DTs, found to have CIWA >20 on 1/9/2020 despite ativan taper.  Admitted to ICU.    ETOH Withdrawal  - Phenobarb 260mg IV Q6H; pending level in AM  - Continue with CIWA  - Continue with precedex and wean as tolerated  - Monitor electrolytes; Replete electrolyte; K today  - C/w IVF  - tube feeds; mvi, folate, thiamine  - check ammonia and phenobarb level in am.      Urinary retention s/p straight cath  - will start flomax  - Saxena placed on 1/10    Fever - noted today, will treat with ceftriaxone for UTI, noted to have bactiuria upon placement of saxena.  Give tylenol.  Chest xray grossly neg.      Dispo: pending weaning off precedex.

## 2020-01-13 LAB
ALBUMIN SERPL ELPH-MCNC: 2.5 G/DL — LOW (ref 3.3–5)
ALP SERPL-CCNC: 42 U/L — SIGNIFICANT CHANGE UP (ref 40–120)
ALT FLD-CCNC: 30 U/L — SIGNIFICANT CHANGE UP (ref 12–78)
AMMONIA BLD-MCNC: 39 UMOL/L — HIGH (ref 11–32)
ANION GAP SERPL CALC-SCNC: 9 MMOL/L — SIGNIFICANT CHANGE UP (ref 5–17)
AST SERPL-CCNC: 17 U/L — SIGNIFICANT CHANGE UP (ref 15–37)
BASOPHILS # BLD AUTO: 0.03 K/UL — SIGNIFICANT CHANGE UP (ref 0–0.2)
BASOPHILS NFR BLD AUTO: 0.5 % — SIGNIFICANT CHANGE UP (ref 0–2)
BILIRUB SERPL-MCNC: 0.4 MG/DL — SIGNIFICANT CHANGE UP (ref 0.2–1.2)
BUN SERPL-MCNC: 7 MG/DL — SIGNIFICANT CHANGE UP (ref 7–23)
CALCIUM SERPL-MCNC: 8.3 MG/DL — LOW (ref 8.5–10.1)
CHLORIDE SERPL-SCNC: 107 MMOL/L — SIGNIFICANT CHANGE UP (ref 96–108)
CO2 SERPL-SCNC: 24 MMOL/L — SIGNIFICANT CHANGE UP (ref 22–31)
CREAT SERPL-MCNC: 0.56 MG/DL — SIGNIFICANT CHANGE UP (ref 0.5–1.3)
EOSINOPHIL # BLD AUTO: 0.19 K/UL — SIGNIFICANT CHANGE UP (ref 0–0.5)
EOSINOPHIL NFR BLD AUTO: 2.9 % — SIGNIFICANT CHANGE UP (ref 0–6)
GLUCOSE SERPL-MCNC: 88 MG/DL — SIGNIFICANT CHANGE UP (ref 70–99)
HCT VFR BLD CALC: 35.7 % — LOW (ref 39–50)
HGB BLD-MCNC: 11.5 G/DL — LOW (ref 13–17)
IMM GRANULOCYTES NFR BLD AUTO: 0.8 % — SIGNIFICANT CHANGE UP (ref 0–1.5)
LYMPHOCYTES # BLD AUTO: 0.93 K/UL — LOW (ref 1–3.3)
LYMPHOCYTES # BLD AUTO: 14.1 % — SIGNIFICANT CHANGE UP (ref 13–44)
MAGNESIUM SERPL-MCNC: 1.8 MG/DL — SIGNIFICANT CHANGE UP (ref 1.6–2.6)
MCHC RBC-ENTMCNC: 28.3 PG — SIGNIFICANT CHANGE UP (ref 27–34)
MCHC RBC-ENTMCNC: 32.2 GM/DL — SIGNIFICANT CHANGE UP (ref 32–36)
MCV RBC AUTO: 87.7 FL — SIGNIFICANT CHANGE UP (ref 80–100)
MONOCYTES # BLD AUTO: 0.88 K/UL — SIGNIFICANT CHANGE UP (ref 0–0.9)
MONOCYTES NFR BLD AUTO: 13.4 % — SIGNIFICANT CHANGE UP (ref 2–14)
NEUTROPHILS # BLD AUTO: 4.5 K/UL — SIGNIFICANT CHANGE UP (ref 1.8–7.4)
NEUTROPHILS NFR BLD AUTO: 68.3 % — SIGNIFICANT CHANGE UP (ref 43–77)
NRBC # BLD: 0 /100 WBCS — SIGNIFICANT CHANGE UP (ref 0–0)
PHENOBARB SERPL-MCNC: 77.5 UG/ML — CRITICAL HIGH (ref 15–40)
PHOSPHATE SERPL-MCNC: 2.3 MG/DL — LOW (ref 2.5–4.5)
PLATELET # BLD AUTO: 180 K/UL — SIGNIFICANT CHANGE UP (ref 150–400)
POTASSIUM SERPL-MCNC: 3.2 MMOL/L — LOW (ref 3.5–5.3)
POTASSIUM SERPL-SCNC: 3.2 MMOL/L — LOW (ref 3.5–5.3)
PROT SERPL-MCNC: 6.6 GM/DL — SIGNIFICANT CHANGE UP (ref 6–8.3)
RBC # BLD: 4.07 M/UL — LOW (ref 4.2–5.8)
RBC # FLD: 12.4 % — SIGNIFICANT CHANGE UP (ref 10.3–14.5)
SODIUM SERPL-SCNC: 140 MMOL/L — SIGNIFICANT CHANGE UP (ref 135–145)
WBC # BLD: 6.58 K/UL — SIGNIFICANT CHANGE UP (ref 3.8–10.5)
WBC # FLD AUTO: 6.58 K/UL — SIGNIFICANT CHANGE UP (ref 3.8–10.5)

## 2020-01-13 PROCEDURE — 99233 SBSQ HOSP IP/OBS HIGH 50: CPT

## 2020-01-13 PROCEDURE — 71045 X-RAY EXAM CHEST 1 VIEW: CPT | Mod: 26

## 2020-01-13 PROCEDURE — 93010 ELECTROCARDIOGRAM REPORT: CPT

## 2020-01-13 RX ORDER — PHENOBARBITAL 60 MG
130 TABLET ORAL EVERY 6 HOURS
Refills: 0 | Status: DISCONTINUED | OUTPATIENT
Start: 2020-01-13 | End: 2020-01-15

## 2020-01-13 RX ORDER — HALOPERIDOL DECANOATE 100 MG/ML
5 INJECTION INTRAMUSCULAR ONCE
Refills: 0 | Status: COMPLETED | OUTPATIENT
Start: 2020-01-13 | End: 2020-01-13

## 2020-01-13 RX ORDER — SODIUM,POTASSIUM PHOSPHATES 278-250MG
1 POWDER IN PACKET (EA) ORAL ONCE
Refills: 0 | Status: COMPLETED | OUTPATIENT
Start: 2020-01-13 | End: 2020-01-13

## 2020-01-13 RX ORDER — HALOPERIDOL DECANOATE 100 MG/ML
1 INJECTION INTRAMUSCULAR EVERY 6 HOURS
Refills: 0 | Status: DISCONTINUED | OUTPATIENT
Start: 2020-01-13 | End: 2020-01-17

## 2020-01-13 RX ORDER — POTASSIUM CHLORIDE 20 MEQ
40 PACKET (EA) ORAL ONCE
Refills: 0 | Status: COMPLETED | OUTPATIENT
Start: 2020-01-13 | End: 2020-01-13

## 2020-01-13 RX ADMIN — HALOPERIDOL DECANOATE 1 MILLIGRAM(S): 100 INJECTION INTRAMUSCULAR at 18:32

## 2020-01-13 RX ADMIN — HALOPERIDOL DECANOATE 5 MILLIGRAM(S): 100 INJECTION INTRAMUSCULAR at 00:30

## 2020-01-13 RX ADMIN — CHLORHEXIDINE GLUCONATE 1 APPLICATION(S): 213 SOLUTION TOPICAL at 11:56

## 2020-01-13 RX ADMIN — HALOPERIDOL DECANOATE 0.5 MILLIGRAM(S): 100 INJECTION INTRAMUSCULAR at 05:43

## 2020-01-13 RX ADMIN — ENOXAPARIN SODIUM 40 MILLIGRAM(S): 100 INJECTION SUBCUTANEOUS at 11:56

## 2020-01-13 RX ADMIN — HALOPERIDOL DECANOATE 1 MILLIGRAM(S): 100 INJECTION INTRAMUSCULAR at 23:18

## 2020-01-13 RX ADMIN — Medication 650 MILLIGRAM(S): at 08:18

## 2020-01-13 RX ADMIN — ATORVASTATIN CALCIUM 20 MILLIGRAM(S): 80 TABLET, FILM COATED ORAL at 22:50

## 2020-01-13 RX ADMIN — Medication 130 MILLIGRAM(S): at 00:21

## 2020-01-13 RX ADMIN — Medication 650 MILLIGRAM(S): at 08:30

## 2020-01-13 RX ADMIN — SODIUM CHLORIDE 75 MILLILITER(S): 9 INJECTION, SOLUTION INTRAVENOUS at 17:26

## 2020-01-13 RX ADMIN — Medication 1 MILLIGRAM(S): at 11:57

## 2020-01-13 RX ADMIN — Medication 130 MILLIGRAM(S): at 12:20

## 2020-01-13 RX ADMIN — DEXMEDETOMIDINE HYDROCHLORIDE IN 0.9% SODIUM CHLORIDE 7.31 MICROGRAM(S)/KG/HR: 4 INJECTION INTRAVENOUS at 03:15

## 2020-01-13 RX ADMIN — Medication 1 PACKET(S): at 09:57

## 2020-01-13 RX ADMIN — Medication 130 MILLIGRAM(S): at 17:13

## 2020-01-13 RX ADMIN — DEXMEDETOMIDINE HYDROCHLORIDE IN 0.9% SODIUM CHLORIDE 7.31 MICROGRAM(S)/KG/HR: 4 INJECTION INTRAVENOUS at 09:22

## 2020-01-13 RX ADMIN — Medication 40 MILLIEQUIVALENT(S): at 05:53

## 2020-01-13 RX ADMIN — Medication 1 TABLET(S): at 11:57

## 2020-01-13 RX ADMIN — DEXMEDETOMIDINE HYDROCHLORIDE IN 0.9% SODIUM CHLORIDE 7.31 MICROGRAM(S)/KG/HR: 4 INJECTION INTRAVENOUS at 22:30

## 2020-01-13 RX ADMIN — SODIUM CHLORIDE 75 MILLILITER(S): 9 INJECTION, SOLUTION INTRAVENOUS at 03:20

## 2020-01-13 RX ADMIN — CEFTRIAXONE 100 MILLIGRAM(S): 500 INJECTION, POWDER, FOR SOLUTION INTRAMUSCULAR; INTRAVENOUS at 22:50

## 2020-01-13 RX ADMIN — Medication 2 MILLIGRAM(S): at 05:43

## 2020-01-13 RX ADMIN — DEXMEDETOMIDINE HYDROCHLORIDE IN 0.9% SODIUM CHLORIDE 7.31 MICROGRAM(S)/KG/HR: 4 INJECTION INTRAVENOUS at 18:36

## 2020-01-13 RX ADMIN — DEXMEDETOMIDINE HYDROCHLORIDE IN 0.9% SODIUM CHLORIDE 7.31 MICROGRAM(S)/KG/HR: 4 INJECTION INTRAVENOUS at 06:20

## 2020-01-13 RX ADMIN — Medication 130 MILLIGRAM(S): at 23:18

## 2020-01-13 RX ADMIN — HALOPERIDOL DECANOATE 5 MILLIGRAM(S): 100 INJECTION INTRAMUSCULAR at 05:49

## 2020-01-13 RX ADMIN — DEXMEDETOMIDINE HYDROCHLORIDE IN 0.9% SODIUM CHLORIDE 7.31 MICROGRAM(S)/KG/HR: 4 INJECTION INTRAVENOUS at 00:21

## 2020-01-13 RX ADMIN — HALOPERIDOL DECANOATE 1 MILLIGRAM(S): 100 INJECTION INTRAMUSCULAR at 14:38

## 2020-01-13 RX ADMIN — DEXMEDETOMIDINE HYDROCHLORIDE IN 0.9% SODIUM CHLORIDE 7.31 MICROGRAM(S)/KG/HR: 4 INJECTION INTRAVENOUS at 12:19

## 2020-01-13 RX ADMIN — Medication 100 MILLIGRAM(S): at 11:57

## 2020-01-13 RX ADMIN — PANTOPRAZOLE SODIUM 40 MILLIGRAM(S): 20 TABLET, DELAYED RELEASE ORAL at 05:43

## 2020-01-13 RX ADMIN — PANTOPRAZOLE SODIUM 40 MILLIGRAM(S): 20 TABLET, DELAYED RELEASE ORAL at 17:12

## 2020-01-13 NOTE — PROGRESS NOTE ADULT - SUBJECTIVE AND OBJECTIVE BOX
HPI:  Pt is a 55 yo IM with h/o ETOH abuse with multiple admissions 2 to DTs/ ETOH withdrawal. Pt admitted 2 to alcoholic gastritis and ETOH withdrawal with course complicated by DTs. Pt with CIWA >20 on 2020 despite Ativan taper. Pt transferred to the ICU; admitting dx: ETOH withdrawal/DTs      ## Labs:  CBC:                        11.5   6.58  )-----------( 180      ( 2020 04:21 )             35.7     Chem:      140  |  107  |  7   ----------------------------<  88  3.2<L>   |  24  |  0.56    Ca    8.3<L>      2020 04:21  Phos  2.3       Mg     1.8         TPro  6.6  /  Alb  2.5<L>  /  TBili  0.4  /  DBili  x   /  AST  17  /  ALT  30  /  AlkPhos  42      Coags:          ## Imaging:    ## Medications:  cefTRIAXone   IVPB 1000 milliGRAM(s) IV Intermittent every 24 hours    tamsulosin 0.4 milliGRAM(s) Oral at bedtime      atorvastatin 20 milliGRAM(s) Oral at bedtime    enoxaparin Injectable 40 milliGRAM(s) SubCutaneous daily    pantoprazole  Injectable 40 milliGRAM(s) IV Push two times a day    acetaminophen   Tablet .. 650 milliGRAM(s) Oral every 6 hours PRN  dexMEDEtomidine Infusion 0.4 MICROgram(s)/kG/Hr IV Continuous <Continuous>  haloperidol    Injectable 1 milliGRAM(s) IV Push every 6 hours  ondansetron Injectable 8 milliGRAM(s) IV Push every 6 hours PRN  PHENobarbital Injectable 130 milliGRAM(s) IV Push every 6 hours  PHENobarbital Injectable 130 milliGRAM(s) IV Push every 1 hour PRN      ## Vitals:  T(C): 36.8 (20 @ 16:37), Max: 38.3 (20 @ 07:30)  HR: 86 (20 @ 16:00) (73 - 112)  BP: 137/92 (20 @ 16:00) (106/70 - 141/94)  BP(mean): 105 (20 @ 16:00) (72 - 108)  RR: 19 (20 @ 16:00) (14 - 25)  SpO2: 100% (20 @ 16:00) (94% - 100%)  Wt(kg): --  Vent:   AB-12 @ 07:  -   @ 07:00  --------------------------------------------------------  IN: 4102.7 mL / OUT: 2660 mL / NET: 1442.7 mL     @ 07: @ 17:12  --------------------------------------------------------  IN: 1302.3 mL / OUT: 1006 mL / NET: 296.3 mL          ## P/E:  Gen: lying comfortably in bed in no apparent distress  Lungs: CTA  Heart: RRR  Abd: Soft/+BS  Ext: No edema  Neuro: Sedated    CENTRAL LINE: [ ] YES [ ] NO  LOCATION:   DATE INSERTED:  REMOVE: [ ] YES [ ] NO      DUSTIN: [ ] YES [ ] NO    DATE INSERTED:  REMOVE:  [ ] YES [ ] NO      A-LINE:  [ ] YES [ ] NO  LOCATION:   DATE INSERTED:  REMOVE:  [ ] YES [ ] NO  EXPLAIN:    CODE STATUS: [x] full code  [ ] DNR  [ ] DNI  [ ] [facility]  Goals of care discussion: [ ] yes

## 2020-01-13 NOTE — PROGRESS NOTE ADULT - ASSESSMENT
Pt is a 53 yo IM with h/o ETOH abuse with multiple admissions 2 to DTs/ ETOH withdrawal. Pt admitted 2 to alcoholic gastritis and ETOH withdrawal with course complicated by DTs. Pt with CIWA >20 on 1/9/2020 despite Ativan taper. Pt transferred to the ICU; admitting dx: ETOH withdrawal/DTs    Resp: [facility]  ID: Started on empiric Abx  FEN: Enteral feeds/ IVF Daily Thiamine, MVI and Folate/ Pt hypokalemic and hypophosphatemic; replace K and Phos  GI/Neuro/Psych: Decrease Phenobarb, change Haldol to IV standing and decrease Precedex

## 2020-01-14 LAB
ANION GAP SERPL CALC-SCNC: 11 MMOL/L — SIGNIFICANT CHANGE UP (ref 5–17)
BUN SERPL-MCNC: 6 MG/DL — LOW (ref 7–23)
CALCIUM SERPL-MCNC: 8.5 MG/DL — SIGNIFICANT CHANGE UP (ref 8.5–10.1)
CHLORIDE SERPL-SCNC: 105 MMOL/L — SIGNIFICANT CHANGE UP (ref 96–108)
CO2 SERPL-SCNC: 22 MMOL/L — SIGNIFICANT CHANGE UP (ref 22–31)
CREAT SERPL-MCNC: 0.65 MG/DL — SIGNIFICANT CHANGE UP (ref 0.5–1.3)
GLUCOSE SERPL-MCNC: 137 MG/DL — HIGH (ref 70–99)
HCT VFR BLD CALC: 37 % — LOW (ref 39–50)
HGB BLD-MCNC: 11.9 G/DL — LOW (ref 13–17)
MAGNESIUM SERPL-MCNC: 1.9 MG/DL — SIGNIFICANT CHANGE UP (ref 1.6–2.6)
MCHC RBC-ENTMCNC: 28.3 PG — SIGNIFICANT CHANGE UP (ref 27–34)
MCHC RBC-ENTMCNC: 32.2 GM/DL — SIGNIFICANT CHANGE UP (ref 32–36)
MCV RBC AUTO: 88.1 FL — SIGNIFICANT CHANGE UP (ref 80–100)
NRBC # BLD: 0 /100 WBCS — SIGNIFICANT CHANGE UP (ref 0–0)
PHOSPHATE SERPL-MCNC: 1.4 MG/DL — LOW (ref 2.5–4.5)
PLATELET # BLD AUTO: 196 K/UL — SIGNIFICANT CHANGE UP (ref 150–400)
POTASSIUM SERPL-MCNC: 3.7 MMOL/L — SIGNIFICANT CHANGE UP (ref 3.5–5.3)
POTASSIUM SERPL-SCNC: 3.7 MMOL/L — SIGNIFICANT CHANGE UP (ref 3.5–5.3)
RBC # BLD: 4.2 M/UL — SIGNIFICANT CHANGE UP (ref 4.2–5.8)
RBC # FLD: 12.4 % — SIGNIFICANT CHANGE UP (ref 10.3–14.5)
SODIUM SERPL-SCNC: 138 MMOL/L — SIGNIFICANT CHANGE UP (ref 135–145)
WBC # BLD: 3.42 K/UL — LOW (ref 3.8–10.5)
WBC # FLD AUTO: 3.42 K/UL — LOW (ref 3.8–10.5)

## 2020-01-14 PROCEDURE — 99233 SBSQ HOSP IP/OBS HIGH 50: CPT

## 2020-01-14 PROCEDURE — 93931 UPPER EXTREMITY STUDY: CPT | Mod: 26

## 2020-01-14 RX ORDER — SODIUM,POTASSIUM PHOSPHATES 278-250MG
1 POWDER IN PACKET (EA) ORAL ONCE
Refills: 0 | Status: COMPLETED | OUTPATIENT
Start: 2020-01-14 | End: 2020-01-14

## 2020-01-14 RX ORDER — PANTOPRAZOLE SODIUM 20 MG/1
40 TABLET, DELAYED RELEASE ORAL DAILY
Refills: 0 | Status: DISCONTINUED | OUTPATIENT
Start: 2020-01-14 | End: 2020-01-17

## 2020-01-14 RX ORDER — MAGNESIUM SULFATE 500 MG/ML
1 VIAL (ML) INJECTION ONCE
Refills: 0 | Status: COMPLETED | OUTPATIENT
Start: 2020-01-14 | End: 2020-01-14

## 2020-01-14 RX ADMIN — Medication 130 MILLIGRAM(S): at 19:57

## 2020-01-14 RX ADMIN — PANTOPRAZOLE SODIUM 40 MILLIGRAM(S): 20 TABLET, DELAYED RELEASE ORAL at 06:18

## 2020-01-14 RX ADMIN — Medication 130 MILLIGRAM(S): at 06:18

## 2020-01-14 RX ADMIN — Medication 1 PACKET(S): at 06:46

## 2020-01-14 RX ADMIN — HALOPERIDOL DECANOATE 1 MILLIGRAM(S): 100 INJECTION INTRAMUSCULAR at 06:18

## 2020-01-14 RX ADMIN — HALOPERIDOL DECANOATE 1 MILLIGRAM(S): 100 INJECTION INTRAMUSCULAR at 17:39

## 2020-01-14 RX ADMIN — Medication 100 GRAM(S): at 08:03

## 2020-01-14 RX ADMIN — HALOPERIDOL DECANOATE 1 MILLIGRAM(S): 100 INJECTION INTRAMUSCULAR at 23:29

## 2020-01-14 RX ADMIN — CHLORHEXIDINE GLUCONATE 1 APPLICATION(S): 213 SOLUTION TOPICAL at 08:03

## 2020-01-14 RX ADMIN — Medication 650 MILLIGRAM(S): at 23:29

## 2020-01-14 RX ADMIN — Medication 130 MILLIGRAM(S): at 11:53

## 2020-01-14 RX ADMIN — Medication 130 MILLIGRAM(S): at 23:29

## 2020-01-14 RX ADMIN — Medication 1 MILLIGRAM(S): at 11:52

## 2020-01-14 RX ADMIN — ENOXAPARIN SODIUM 40 MILLIGRAM(S): 100 INJECTION SUBCUTANEOUS at 11:53

## 2020-01-14 RX ADMIN — ATORVASTATIN CALCIUM 20 MILLIGRAM(S): 80 TABLET, FILM COATED ORAL at 22:59

## 2020-01-14 RX ADMIN — Medication 100 MILLIGRAM(S): at 11:52

## 2020-01-14 RX ADMIN — Medication 1 PACKET(S): at 10:19

## 2020-01-14 RX ADMIN — DEXMEDETOMIDINE HYDROCHLORIDE IN 0.9% SODIUM CHLORIDE 7.31 MICROGRAM(S)/KG/HR: 4 INJECTION INTRAVENOUS at 02:20

## 2020-01-14 RX ADMIN — HALOPERIDOL DECANOATE 1 MILLIGRAM(S): 100 INJECTION INTRAMUSCULAR at 11:53

## 2020-01-14 RX ADMIN — Medication 650 MILLIGRAM(S): at 12:18

## 2020-01-14 RX ADMIN — Medication 650 MILLIGRAM(S): at 13:10

## 2020-01-14 RX ADMIN — Medication 1 TABLET(S): at 11:52

## 2020-01-14 RX ADMIN — Medication 130 MILLIGRAM(S): at 17:39

## 2020-01-14 NOTE — PROGRESS NOTE ADULT - ASSESSMENT
Pt is a 53 yo IM with h/o ETOH abuse with multiple admissions 2 to DTs/ ETOH withdrawal. Pt admitted 2 to alcoholic gastritis and ETOH withdrawal with course complicated by DTs. Pt with CIWA >20 on 1/9/2020 despite Ativan taper. Pt transferred to the ICU; admitting dx: ETOH withdrawal/DTs    Resp: [facility]  ID: May dc Abx  FEN: Enteral feeds/ IVF Daily Thiamine, MVI and Folate/ Pt hypophosphatemic; replace Phos  GI/Neuro/Psych: Cont Phenobarb and Haldol; Precedex dc'd Pt is a 55 yo IM with h/o ETOH abuse with multiple admissions 2 to DTs/ ETOH withdrawal. Pt admitted 2 to alcoholic gastritis and ETOH withdrawal with course complicated by DTs. Pt with CIWA >20 on 1/9/2020 despite Ativan taper. Pt transferred to the ICU; admitting dx: ETOH withdrawal/DTs    Resp: [facility]  ID: May dc Abx  FEN: Cont enteral feeds/Daily Thiamine, MVI and Folate/ Pt hypophosphatemic; replace Phos  GI/Neuro/Psych: Cont Phenobarb and Haldol; Precedex dc'd

## 2020-01-14 NOTE — PROGRESS NOTE ADULT - SUBJECTIVE AND OBJECTIVE BOX
HPI:  Pt is a 53 yo IM with h/o ETOH abuse with multiple admissions 2 to DTs/ ETOH withdrawal. Pt admitted 2 to alcoholic gastritis and ETOH withdrawal with course complicated by DTs. Pt with CIWA >20 on 2020 despite Ativan taper. Pt transferred to the ICU; admitting dx: ETOH withdrawal/DTs      ## Labs:  CBC:                        11.9   3.42  )-----------( 196      ( 2020 04:22 )             37.0     Chem:      138  |  105  |  6<L>  ----------------------------<  137<H>  3.7   |  22  |  0.65    Ca    8.5      2020 04:22  Phos  1.4       Mg     1.9         TPro  6.6  /  Alb  2.5<L>  /  TBili  0.4  /  DBili  x   /  AST  17  /  ALT  30  /  AlkPhos  42      Coags:          ## Imaging:    ## Medications:    tamsulosin 0.4 milliGRAM(s) Oral at bedtime      atorvastatin 20 milliGRAM(s) Oral at bedtime    enoxaparin Injectable 40 milliGRAM(s) SubCutaneous daily    pantoprazole   Suspension 40 milliGRAM(s) Oral daily    acetaminophen   Tablet .. 650 milliGRAM(s) Oral every 6 hours PRN  dexMEDEtomidine Infusion 0.4 MICROgram(s)/kG/Hr IV Continuous <Continuous>  haloperidol    Injectable 1 milliGRAM(s) IV Push every 6 hours  PHENobarbital Injectable 130 milliGRAM(s) IV Push every 6 hours  PHENobarbital Injectable 130 milliGRAM(s) IV Push every 1 hour PRN      ## Vitals:  T(C): 37.8 (20 @ 19:21), Max: 38.6 (20 @ 15:41)  HR: 138 (20 @ 21:00) (70 - 138)  BP: 167/96 (20 @ 21:00) (89/51 - 167/96)  BP(mean): 114 (20 @ 21:00) (63 - 125)  RR: 20 (20 @ 21:00) (15 - 33)  SpO2: 97% (20 @ 21:00) (85% - 100%)  Wt(kg): --  Vent:   AB-13 @ 07: @ 07:00  --------------------------------------------------------  IN: 3165.2 mL / OUT: 2276 mL / NET: 889.2 mL     @ 07: @ 21:56  --------------------------------------------------------  IN: 1110 mL / OUT: 760 mL / NET: 350 mL          ## P/E:  Gen: lying comfortably in bed in no apparent distress  Lungs: CTA  Heart: Tachy  Abd: Soft/+BS  Ext: Hand edema  Neuro: Slightly agitated    CENTRAL LINE: [ ] YES [ ] NO  LOCATION:   DATE INSERTED:  REMOVE: [ ] YES [ ] NO      DUSTIN: [ ] YES [ ] NO    DATE INSERTED:  REMOVE:  [ ] YES [ ] NO      A-LINE:  [ ] YES [ ] NO  LOCATION:   DATE INSERTED:  REMOVE:  [ ] YES [ ] NO  EXPLAIN:    CODE STATUS: [x] full code  [ ] DNR  [ ] DNI  [ ] [facility]  Goals of care discussion: [ ] yes

## 2020-01-15 LAB
ANION GAP SERPL CALC-SCNC: 7 MMOL/L — SIGNIFICANT CHANGE UP (ref 5–17)
BUN SERPL-MCNC: 6 MG/DL — LOW (ref 7–23)
CALCIUM SERPL-MCNC: 9.3 MG/DL — SIGNIFICANT CHANGE UP (ref 8.5–10.1)
CHLORIDE SERPL-SCNC: 105 MMOL/L — SIGNIFICANT CHANGE UP (ref 96–108)
CO2 SERPL-SCNC: 25 MMOL/L — SIGNIFICANT CHANGE UP (ref 22–31)
CREAT SERPL-MCNC: 0.81 MG/DL — SIGNIFICANT CHANGE UP (ref 0.5–1.3)
GLUCOSE SERPL-MCNC: 115 MG/DL — HIGH (ref 70–99)
HCT VFR BLD CALC: 38.3 % — LOW (ref 39–50)
HGB BLD-MCNC: 12.3 G/DL — LOW (ref 13–17)
MAGNESIUM SERPL-MCNC: 1.9 MG/DL — SIGNIFICANT CHANGE UP (ref 1.6–2.6)
MCHC RBC-ENTMCNC: 28.5 PG — SIGNIFICANT CHANGE UP (ref 27–34)
MCHC RBC-ENTMCNC: 32.1 GM/DL — SIGNIFICANT CHANGE UP (ref 32–36)
MCV RBC AUTO: 88.9 FL — SIGNIFICANT CHANGE UP (ref 80–100)
NRBC # BLD: 0 /100 WBCS — SIGNIFICANT CHANGE UP (ref 0–0)
PHENOBARB SERPL-MCNC: 88.4 UG/ML — CRITICAL HIGH (ref 15–40)
PHOSPHATE SERPL-MCNC: 2.2 MG/DL — LOW (ref 2.5–4.5)
PLATELET # BLD AUTO: 270 K/UL — SIGNIFICANT CHANGE UP (ref 150–400)
POTASSIUM SERPL-MCNC: 3.5 MMOL/L — SIGNIFICANT CHANGE UP (ref 3.5–5.3)
POTASSIUM SERPL-SCNC: 3.5 MMOL/L — SIGNIFICANT CHANGE UP (ref 3.5–5.3)
RBC # BLD: 4.31 M/UL — SIGNIFICANT CHANGE UP (ref 4.2–5.8)
RBC # FLD: 12.6 % — SIGNIFICANT CHANGE UP (ref 10.3–14.5)
SODIUM SERPL-SCNC: 137 MMOL/L — SIGNIFICANT CHANGE UP (ref 135–145)
WBC # BLD: 6.25 K/UL — SIGNIFICANT CHANGE UP (ref 3.8–10.5)
WBC # FLD AUTO: 6.25 K/UL — SIGNIFICANT CHANGE UP (ref 3.8–10.5)

## 2020-01-15 PROCEDURE — 99233 SBSQ HOSP IP/OBS HIGH 50: CPT

## 2020-01-15 PROCEDURE — 71045 X-RAY EXAM CHEST 1 VIEW: CPT | Mod: 26

## 2020-01-15 RX ORDER — POTASSIUM PHOSPHATE, MONOBASIC POTASSIUM PHOSPHATE, DIBASIC 236; 224 MG/ML; MG/ML
15 INJECTION, SOLUTION INTRAVENOUS ONCE
Refills: 0 | Status: COMPLETED | OUTPATIENT
Start: 2020-01-15 | End: 2020-01-15

## 2020-01-15 RX ORDER — PHENOBARBITAL 60 MG
65 TABLET ORAL
Refills: 0 | Status: DISCONTINUED | OUTPATIENT
Start: 2020-01-15 | End: 2020-01-17

## 2020-01-15 RX ORDER — METOPROLOL TARTRATE 50 MG
25 TABLET ORAL EVERY 12 HOURS
Refills: 0 | Status: DISCONTINUED | OUTPATIENT
Start: 2020-01-15 | End: 2020-01-19

## 2020-01-15 RX ORDER — SODIUM,POTASSIUM PHOSPHATES 278-250MG
2 POWDER IN PACKET (EA) ORAL
Refills: 0 | Status: COMPLETED | OUTPATIENT
Start: 2020-01-15 | End: 2020-01-15

## 2020-01-15 RX ADMIN — Medication 2 PACKET(S): at 11:59

## 2020-01-15 RX ADMIN — Medication 650 MILLIGRAM(S): at 07:27

## 2020-01-15 RX ADMIN — Medication 100 MILLIGRAM(S): at 11:57

## 2020-01-15 RX ADMIN — ATORVASTATIN CALCIUM 20 MILLIGRAM(S): 80 TABLET, FILM COATED ORAL at 22:14

## 2020-01-15 RX ADMIN — HALOPERIDOL DECANOATE 1 MILLIGRAM(S): 100 INJECTION INTRAMUSCULAR at 11:57

## 2020-01-15 RX ADMIN — Medication 25 MILLIGRAM(S): at 17:09

## 2020-01-15 RX ADMIN — ENOXAPARIN SODIUM 40 MILLIGRAM(S): 100 INJECTION SUBCUTANEOUS at 11:57

## 2020-01-15 RX ADMIN — Medication 650 MILLIGRAM(S): at 16:45

## 2020-01-15 RX ADMIN — Medication 2 PACKET(S): at 09:28

## 2020-01-15 RX ADMIN — Medication 1 TABLET(S): at 11:57

## 2020-01-15 RX ADMIN — POTASSIUM PHOSPHATE, MONOBASIC POTASSIUM PHOSPHATE, DIBASIC 62.5 MILLIMOLE(S): 236; 224 INJECTION, SOLUTION INTRAVENOUS at 06:38

## 2020-01-15 RX ADMIN — Medication 1 MILLIGRAM(S): at 11:57

## 2020-01-15 RX ADMIN — Medication 2 MILLIGRAM(S): at 06:37

## 2020-01-15 RX ADMIN — CHLORHEXIDINE GLUCONATE 1 APPLICATION(S): 213 SOLUTION TOPICAL at 06:38

## 2020-01-15 RX ADMIN — PANTOPRAZOLE SODIUM 40 MILLIGRAM(S): 20 TABLET, DELAYED RELEASE ORAL at 11:57

## 2020-01-15 RX ADMIN — Medication 25 MILLIGRAM(S): at 01:47

## 2020-01-15 RX ADMIN — Medication 650 MILLIGRAM(S): at 15:53

## 2020-01-15 RX ADMIN — Medication 650 MILLIGRAM(S): at 08:15

## 2020-01-15 RX ADMIN — HALOPERIDOL DECANOATE 1 MILLIGRAM(S): 100 INJECTION INTRAMUSCULAR at 06:38

## 2020-01-15 RX ADMIN — HALOPERIDOL DECANOATE 1 MILLIGRAM(S): 100 INJECTION INTRAMUSCULAR at 17:09

## 2020-01-15 NOTE — PROGRESS NOTE ADULT - ASSESSMENT
Pt is a 55 yo IM with h/o ETOH abuse with multiple admissions 2 to DTs/ ETOH withdrawal. Pt admitted 2 to alcoholic gastritis and ETOH withdrawal with course complicated by DTs. Pt with CIWA >20 on 1/9/2020 despite Ativan taper. Pt transferred to the ICU; admitting dx: ETOH withdrawal/DTs; pt still agitated, confused at times    Resp: [facility]OB  ID: Off Abx  FEN: Cont enteral feeds/Daily Thiamine, MVI and Folate/ Pt hypophosphatemic; replace Phos  Neuro/Psych: Cont standing Haldol and Phenobarb prn  2 to elevated Phenobarb level

## 2020-01-15 NOTE — PROGRESS NOTE ADULT - SUBJECTIVE AND OBJECTIVE BOX
HPI:  Pt is a 55 yo IM with h/o ETOH abuse with multiple admissions 2 to DTs/ ETOH withdrawal. Pt admitted 2 to alcoholic gastritis and ETOH withdrawal with course complicated by DTs. Pt with CIWA >20 on 2020 despite Ativan taper. Pt transferred to the ICU; admitting dx: ETOH withdrawal/DTs; pt still agitated, confused at times      ## Labs:  CBC:                        12.3   6.25  )-----------( 270      ( 15 Brad 2020 03:40 )             38.3     Chem:  01-15    137  |  105  |  6<L>  ----------------------------<  115<H>  3.5   |  25  |  0.81    Ca    9.3      15 Brad 2020 03:40  Phos  2.2     01-15  Mg     1.9     01-15      Coags:          ## Imaging:    ## Medications:    metoprolol tartrate 25 milliGRAM(s) Oral every 12 hours      atorvastatin 20 milliGRAM(s) Oral at bedtime    enoxaparin Injectable 40 milliGRAM(s) SubCutaneous daily    pantoprazole   Suspension 40 milliGRAM(s) Oral daily    acetaminophen   Tablet .. 650 milliGRAM(s) Oral every 6 hours PRN  dexMEDEtomidine Infusion 0.4 MICROgram(s)/kG/Hr IV Continuous <Continuous>  haloperidol    Injectable 1 milliGRAM(s) IV Push every 6 hours  PHENobarbital Injectable 65 milliGRAM(s) IV Push every 3 hours PRN      ## Vitals:  T(C): 37.7 (01-15-20 @ 19:20), Max: 38.8 (01-15-20 @ 12:00)  HR: 91 (01-15-20 @ 20:00) (87 - 134)  BP: 141/95 (01-15-20 @ 20:00) (113/82 - 170/97)  BP(mean): 109 (01-15-20 @ 20:00) (93 - 132)  RR: 18 (01-15-20 @ 20:00) (15 - 27)  SpO2: 95% (01-15-20 @ 20:00) (93% - 97%)  Wt(kg): --  Vent:   AB-14 @ 07:01  -  01-15 @ 07:00  --------------------------------------------------------  IN: 1665 mL / OUT: 1315 mL / NET: 350 mL    01-15 @ 07:01  -  01-15 @ 21:19  --------------------------------------------------------  IN: 965 mL / OUT: 470 mL / NET: 495 mL          ## P/E:  Gen: lying comfortably in bed in no apparent distress  Lungs: R rhonchi  Heart: Tachy  Abd: Soft/+BS  Ext: UE edema  Neuro: Calm    CENTRAL LINE: [ ] YES [ ] NO  LOCATION:   DATE INSERTED:  REMOVE: [ ] YES [ ] NO      DUSTIN: [ ] YES [ ] NO    DATE INSERTED:  REMOVE:  [ ] YES [ ] NO      A-LINE:  [ ] YES [ ] NO  LOCATION:   DATE INSERTED:  REMOVE:  [ ] YES [ ] NO  EXPLAIN:    CODE STATUS: [x] full code  [ ] DNR  [ ] DNI  [ ] [facility]  Goals of care discussion: [ ] yes

## 2020-01-16 LAB
ANION GAP SERPL CALC-SCNC: 7 MMOL/L — SIGNIFICANT CHANGE UP (ref 5–17)
BUN SERPL-MCNC: 11 MG/DL — SIGNIFICANT CHANGE UP (ref 7–23)
CALCIUM SERPL-MCNC: 9 MG/DL — SIGNIFICANT CHANGE UP (ref 8.5–10.1)
CHLORIDE SERPL-SCNC: 103 MMOL/L — SIGNIFICANT CHANGE UP (ref 96–108)
CO2 SERPL-SCNC: 26 MMOL/L — SIGNIFICANT CHANGE UP (ref 22–31)
CREAT SERPL-MCNC: 0.62 MG/DL — SIGNIFICANT CHANGE UP (ref 0.5–1.3)
GLUCOSE SERPL-MCNC: 109 MG/DL — HIGH (ref 70–99)
HCT VFR BLD CALC: 36.6 % — LOW (ref 39–50)
HGB BLD-MCNC: 12 G/DL — LOW (ref 13–17)
MAGNESIUM SERPL-MCNC: 2 MG/DL — SIGNIFICANT CHANGE UP (ref 1.6–2.6)
MCHC RBC-ENTMCNC: 28.4 PG — SIGNIFICANT CHANGE UP (ref 27–34)
MCHC RBC-ENTMCNC: 32.8 GM/DL — SIGNIFICANT CHANGE UP (ref 32–36)
MCV RBC AUTO: 86.5 FL — SIGNIFICANT CHANGE UP (ref 80–100)
NRBC # BLD: 0 /100 WBCS — SIGNIFICANT CHANGE UP (ref 0–0)
PHOSPHATE SERPL-MCNC: 3.5 MG/DL — SIGNIFICANT CHANGE UP (ref 2.5–4.5)
PLATELET # BLD AUTO: 319 K/UL — SIGNIFICANT CHANGE UP (ref 150–400)
POTASSIUM SERPL-MCNC: 3.4 MMOL/L — LOW (ref 3.5–5.3)
POTASSIUM SERPL-SCNC: 3.4 MMOL/L — LOW (ref 3.5–5.3)
RBC # BLD: 4.23 M/UL — SIGNIFICANT CHANGE UP (ref 4.2–5.8)
RBC # FLD: 13 % — SIGNIFICANT CHANGE UP (ref 10.3–14.5)
SODIUM SERPL-SCNC: 136 MMOL/L — SIGNIFICANT CHANGE UP (ref 135–145)
WBC # BLD: 6.61 K/UL — SIGNIFICANT CHANGE UP (ref 3.8–10.5)
WBC # FLD AUTO: 6.61 K/UL — SIGNIFICANT CHANGE UP (ref 3.8–10.5)

## 2020-01-16 PROCEDURE — 99233 SBSQ HOSP IP/OBS HIGH 50: CPT

## 2020-01-16 RX ORDER — HYDROMORPHONE HYDROCHLORIDE 2 MG/ML
0.5 INJECTION INTRAMUSCULAR; INTRAVENOUS; SUBCUTANEOUS ONCE
Refills: 0 | Status: DISCONTINUED | OUTPATIENT
Start: 2020-01-16 | End: 2020-01-16

## 2020-01-16 RX ORDER — ACETAMINOPHEN 500 MG
650 TABLET ORAL ONCE
Refills: 0 | Status: COMPLETED | OUTPATIENT
Start: 2020-01-16 | End: 2020-01-16

## 2020-01-16 RX ORDER — POTASSIUM CHLORIDE 20 MEQ
40 PACKET (EA) ORAL ONCE
Refills: 0 | Status: COMPLETED | OUTPATIENT
Start: 2020-01-16 | End: 2020-01-16

## 2020-01-16 RX ORDER — BACITRACIN ZINC 500 UNIT/G
1 OINTMENT IN PACKET (EA) TOPICAL
Refills: 0 | Status: DISCONTINUED | OUTPATIENT
Start: 2020-01-16 | End: 2020-01-18

## 2020-01-16 RX ADMIN — HALOPERIDOL DECANOATE 1 MILLIGRAM(S): 100 INJECTION INTRAMUSCULAR at 00:37

## 2020-01-16 RX ADMIN — PANTOPRAZOLE SODIUM 40 MILLIGRAM(S): 20 TABLET, DELAYED RELEASE ORAL at 11:22

## 2020-01-16 RX ADMIN — Medication 25 MILLIGRAM(S): at 05:36

## 2020-01-16 RX ADMIN — Medication 100 MILLIGRAM(S): at 11:22

## 2020-01-16 RX ADMIN — Medication 650 MILLIGRAM(S): at 16:29

## 2020-01-16 RX ADMIN — Medication 25 MILLIGRAM(S): at 17:07

## 2020-01-16 RX ADMIN — Medication 650 MILLIGRAM(S): at 05:36

## 2020-01-16 RX ADMIN — Medication 650 MILLIGRAM(S): at 17:00

## 2020-01-16 RX ADMIN — HALOPERIDOL DECANOATE 1 MILLIGRAM(S): 100 INJECTION INTRAMUSCULAR at 11:22

## 2020-01-16 RX ADMIN — Medication 40 MILLIEQUIVALENT(S): at 06:34

## 2020-01-16 RX ADMIN — ENOXAPARIN SODIUM 40 MILLIGRAM(S): 100 INJECTION SUBCUTANEOUS at 11:21

## 2020-01-16 RX ADMIN — CHLORHEXIDINE GLUCONATE 1 APPLICATION(S): 213 SOLUTION TOPICAL at 06:34

## 2020-01-16 RX ADMIN — HYDROMORPHONE HYDROCHLORIDE 0.5 MILLIGRAM(S): 2 INJECTION INTRAMUSCULAR; INTRAVENOUS; SUBCUTANEOUS at 23:52

## 2020-01-16 RX ADMIN — ATORVASTATIN CALCIUM 20 MILLIGRAM(S): 80 TABLET, FILM COATED ORAL at 22:38

## 2020-01-16 RX ADMIN — Medication 650 MILLIGRAM(S): at 06:00

## 2020-01-16 RX ADMIN — HALOPERIDOL DECANOATE 1 MILLIGRAM(S): 100 INJECTION INTRAMUSCULAR at 05:36

## 2020-01-16 RX ADMIN — Medication 1 MILLIGRAM(S): at 11:22

## 2020-01-16 RX ADMIN — Medication 1 TABLET(S): at 11:22

## 2020-01-16 RX ADMIN — Medication 1 APPLICATION(S): at 23:30

## 2020-01-16 RX ADMIN — HALOPERIDOL DECANOATE 1 MILLIGRAM(S): 100 INJECTION INTRAMUSCULAR at 17:06

## 2020-01-16 NOTE — PROGRESS NOTE ADULT - ASSESSMENT
Pt is a 53 yo IM with h/o ETOH abuse with multiple admissions 2 to DTs/ ETOH withdrawal. Pt admitted 2 to alcoholic gastritis and ETOH withdrawal with course complicated by DTs. Pt with CIWA >20 on 1/9/2020 despite Ativan taper. Pt transferred to the ICU; admitting dx: ETOH withdrawal/DTs; pt still agitated, confused at times    Resp: [facility] HOB  ID: Off Abx  FEN: Cont enteral feeds/Daily Thiamine, MVI and Folate/ Pt hypophosphatemic; replace Phos  Neuro/Psych: Cont standing Haldol and Phenobarb prn  2 to elevated Phenobarb level Pt is a 53 yo IM with h/o ETOH abuse with multiple admissions 2 to DTs/ ETOH withdrawal. Pt admitted 2 to alcoholic gastritis and ETOH withdrawal with course complicated by DTs. Pt with CIWA >20 on 1/9/2020 despite Ativan taper. Pt transferred to the ICU; admitting dx: ETOH withdrawal/DTs; pt intermittently agitated and confused at times    Resp: [facility]  ID: Off Abx  FEN: Cont enteral feeds/Daily Thiamine, MVI and Folate  Neuro/Psych: Cont standing Haldol and Phenobarb prn 2 to elevated Phenobarb level  Social: Possible transfer to F in next day or so

## 2020-01-16 NOTE — PROGRESS NOTE ADULT - SUBJECTIVE AND OBJECTIVE BOX
HPI:  Patient is a 54 M with a PMH of  ETOH abuse and withdrawal (s/p frequent ED visits and admissions), GERD, HLD, alcoholic gastritis/esophagitis, PUD who presents for abdominal pain and shakes.  Patient reports that his last drink was 2 days ago and starting from yesteday he has had shakes and NBNB vomiting.  Patient reports epigastric pain like heartburn that started after he vomited.  Patient denies recent hx of seizures.  Patient has no other complaints. (2020 01:55)      24 hr events:      ## Labs:  CBC:                        12.0   6.61  )-----------( 319      ( 2020 04:07 )             36.6     Chem:      136  |  103  |  11  ----------------------------<  109<H>  3.4<L>   |  26  |  0.62    Ca    9.0      2020 04:07  Phos  3.5       Mg     2.0           Coags:          ## Imaging:    ## Medications:    metoprolol tartrate 25 milliGRAM(s) Oral every 12 hours      atorvastatin 20 milliGRAM(s) Oral at bedtime    enoxaparin Injectable 40 milliGRAM(s) SubCutaneous daily    pantoprazole   Suspension 40 milliGRAM(s) Oral daily    acetaminophen    Suspension .. 650 milliGRAM(s) Oral once  acetaminophen   Tablet .. 650 milliGRAM(s) Oral every 6 hours PRN  dexMEDEtomidine Infusion 0.4 MICROgram(s)/kG/Hr IV Continuous <Continuous>  haloperidol    Injectable 1 milliGRAM(s) IV Push every 6 hours  PHENobarbital Injectable 65 milliGRAM(s) IV Push every 3 hours PRN      ## Vitals:  T(C): 37.7 (20 @ 20:00), Max: 38.4 (20 @ 16:09)  HR: 84 (20 @ 22:00) (76 - 103)  BP: 147/94 (20 @ 22:00) (119/80 - 154/100)  BP(mean): 106 (20 @ 22:00) (91 - 116)  RR: 16 (20 @ 22:00) (14 - 20)  SpO2: 97% (20 @ 22:00) (94% - 100%)  Wt(kg): --  Vent:   AB-15 @ 07: @ 07:00  --------------------------------------------------------  IN: 1675 mL / OUT: 860 mL / NET: 815 mL     @ 07: @ 23:16  --------------------------------------------------------  IN: 770 mL / OUT: 610 mL / NET: 160 mL          ## P/E:  Gen: lying comfortably in bed in no apparent distress  Mouth:   Neck:  Lungs:   Heart:   Abd:  Ext:  Neuro:    CENTRAL LINE: [ ] YES [ ] NO  LOCATION:   DATE INSERTED:  REMOVE: [ ] YES [ ] NO      CHAN: [ ] YES [ ] NO    DATE INSERTED:  REMOVE:  [ ] YES [ ] NO      A-LINE:  [ ] YES [ ] NO  LOCATION:   DATE INSERTED:  REMOVE:  [ ] YES [ ] NO  EXPLAIN:    GLOBAL ISSUE/BEST PRACTICE:  Analgesia:  Sedation:  HOB elevation: yes  Stress ulcer prophylaxis:  VTE prophylaxis:  Oral Care:  Glycemic control:  Nutrition:    CODE STATUS: [ ] full code  [ ] DNR  [ ] DNI  [ ] [facility]  Goals of care discussion: [ ] yes HPI:  Pt is a 55 yo IM with h/o ETOH abuse with multiple admissions 2 to DTs/ ETOH withdrawal. Pt admitted 2 to alcoholic gastritis and ETOH withdrawal with course complicated by DTs. Pt with CIWA >20 on 2020 despite Ativan taper. Pt transferred to the ICU; admitting dx: ETOH withdrawal/DTs; pt intermittently agitated and confused at times      ## Labs:  CBC:                        12.0   6.61  )-----------( 319      ( 2020 04:07 )             36.6     Chem:      136  |  103  |  11  ----------------------------<  109<H>  3.4<L>   |  26  |  0.62    Ca    9.0      2020 04:07  Phos  3.5       Mg     2.0           Coags:          ## Imaging:    ## Medications:    metoprolol tartrate 25 milliGRAM(s) Oral every 12 hours      atorvastatin 20 milliGRAM(s) Oral at bedtime    enoxaparin Injectable 40 milliGRAM(s) SubCutaneous daily    pantoprazole   Suspension 40 milliGRAM(s) Oral daily    acetaminophen    Suspension .. 650 milliGRAM(s) Oral once  acetaminophen   Tablet .. 650 milliGRAM(s) Oral every 6 hours PRN  dexMEDEtomidine Infusion 0.4 MICROgram(s)/kG/Hr IV Continuous <Continuous>  haloperidol    Injectable 1 milliGRAM(s) IV Push every 6 hours  PHENobarbital Injectable 65 milliGRAM(s) IV Push every 3 hours PRN      ## Vitals:  T(C): 37.7 (20 @ 20:00), Max: 38.4 (20 @ 16:09)  HR: 84 (20 @ 22:00) (76 - 103)  BP: 147/94 (20 @ 22:00) (119/80 - 154/100)  BP(mean): 106 (20 @ 22:00) (91 - 116)  RR: 16 (20 @ 22:00) (14 - 20)  SpO2: 97% (20 @ 22:00) (94% - 100%)  Wt(kg): --  Vent:   AB-15 @ 07: @ 07:00  --------------------------------------------------------  IN: 1675 mL / OUT: 860 mL / NET: 815 mL     @ 07: @ 23:16  --------------------------------------------------------  IN: 770 mL / OUT: 610 mL / NET: 160 mL          ## P/E:  Gen: lying comfortably in bed in no apparent distress  Lungs: CTA  Heart: RRR  Abd: Soft/+BS  Ext: UE edema  Neuro: Calm    CENTRAL LINE: [ ] YES [ ] NO  LOCATION:   DATE INSERTED:  REMOVE: [ ] YES [ ] NO      CHAN: [ ] YES [ ] NO    DATE INSERTED:  REMOVE:  [ ] YES [ ] NO      A-LINE:  [ ] YES [ ] NO  LOCATION:   DATE INSERTED:  REMOVE:  [ ] YES [ ] NO  EXPLAIN:    CODE STATUS: [x] full code  [ ] DNR  [ ] DNI  [ ] [facility]  Goals of care discussion: [ ] yes

## 2020-01-17 LAB
ALBUMIN SERPL ELPH-MCNC: 2.7 G/DL — LOW (ref 3.3–5)
ALP SERPL-CCNC: 55 U/L — SIGNIFICANT CHANGE UP (ref 40–120)
ALT FLD-CCNC: 21 U/L — SIGNIFICANT CHANGE UP (ref 12–78)
ANION GAP SERPL CALC-SCNC: 8 MMOL/L — SIGNIFICANT CHANGE UP (ref 5–17)
AST SERPL-CCNC: 16 U/L — SIGNIFICANT CHANGE UP (ref 15–37)
BILIRUB SERPL-MCNC: 0.2 MG/DL — SIGNIFICANT CHANGE UP (ref 0.2–1.2)
BUN SERPL-MCNC: 11 MG/DL — SIGNIFICANT CHANGE UP (ref 7–23)
CALCIUM SERPL-MCNC: 9.2 MG/DL — SIGNIFICANT CHANGE UP (ref 8.5–10.1)
CHLORIDE SERPL-SCNC: 103 MMOL/L — SIGNIFICANT CHANGE UP (ref 96–108)
CO2 SERPL-SCNC: 25 MMOL/L — SIGNIFICANT CHANGE UP (ref 22–31)
CREAT SERPL-MCNC: 0.7 MG/DL — SIGNIFICANT CHANGE UP (ref 0.5–1.3)
GLUCOSE SERPL-MCNC: 107 MG/DL — HIGH (ref 70–99)
HCT VFR BLD CALC: 40.4 % — SIGNIFICANT CHANGE UP (ref 39–50)
HGB BLD-MCNC: 13.2 G/DL — SIGNIFICANT CHANGE UP (ref 13–17)
MAGNESIUM SERPL-MCNC: 2.3 MG/DL — SIGNIFICANT CHANGE UP (ref 1.6–2.6)
MCHC RBC-ENTMCNC: 28.7 PG — SIGNIFICANT CHANGE UP (ref 27–34)
MCHC RBC-ENTMCNC: 32.7 GM/DL — SIGNIFICANT CHANGE UP (ref 32–36)
MCV RBC AUTO: 87.8 FL — SIGNIFICANT CHANGE UP (ref 80–100)
NRBC # BLD: 2 /100 WBCS — HIGH (ref 0–0)
PHOSPHATE SERPL-MCNC: 3.1 MG/DL — SIGNIFICANT CHANGE UP (ref 2.5–4.5)
PLATELET # BLD AUTO: 394 K/UL — SIGNIFICANT CHANGE UP (ref 150–400)
POTASSIUM SERPL-MCNC: 3.9 MMOL/L — SIGNIFICANT CHANGE UP (ref 3.5–5.3)
POTASSIUM SERPL-SCNC: 3.9 MMOL/L — SIGNIFICANT CHANGE UP (ref 3.5–5.3)
PROT SERPL-MCNC: 8.2 GM/DL — SIGNIFICANT CHANGE UP (ref 6–8.3)
RBC # BLD: 4.6 M/UL — SIGNIFICANT CHANGE UP (ref 4.2–5.8)
RBC # FLD: 12.9 % — SIGNIFICANT CHANGE UP (ref 10.3–14.5)
SODIUM SERPL-SCNC: 136 MMOL/L — SIGNIFICANT CHANGE UP (ref 135–145)
WBC # BLD: 3.98 K/UL — SIGNIFICANT CHANGE UP (ref 3.8–10.5)
WBC # FLD AUTO: 3.98 K/UL — SIGNIFICANT CHANGE UP (ref 3.8–10.5)

## 2020-01-17 PROCEDURE — 99233 SBSQ HOSP IP/OBS HIGH 50: CPT

## 2020-01-17 RX ORDER — HYDROMORPHONE HYDROCHLORIDE 2 MG/ML
0.5 INJECTION INTRAMUSCULAR; INTRAVENOUS; SUBCUTANEOUS ONCE
Refills: 0 | Status: DISCONTINUED | OUTPATIENT
Start: 2020-01-17 | End: 2020-01-17

## 2020-01-17 RX ORDER — HYDROMORPHONE HYDROCHLORIDE 2 MG/ML
2 INJECTION INTRAMUSCULAR; INTRAVENOUS; SUBCUTANEOUS ONCE
Refills: 0 | Status: DISCONTINUED | OUTPATIENT
Start: 2020-01-17 | End: 2020-01-17

## 2020-01-17 RX ORDER — ACYCLOVIR 50 MG/G
1 OINTMENT TOPICAL
Refills: 0 | Status: DISCONTINUED | OUTPATIENT
Start: 2020-01-17 | End: 2020-01-18

## 2020-01-17 RX ORDER — BENZOCAINE AND MENTHOL 5; 1 G/100ML; G/100ML
1 LIQUID ORAL
Refills: 0 | Status: DISCONTINUED | OUTPATIENT
Start: 2020-01-17 | End: 2020-01-19

## 2020-01-17 RX ORDER — ACETAMINOPHEN 500 MG
1000 TABLET ORAL ONCE
Refills: 0 | Status: COMPLETED | OUTPATIENT
Start: 2020-01-17 | End: 2020-01-17

## 2020-01-17 RX ADMIN — HYDROMORPHONE HYDROCHLORIDE 0.5 MILLIGRAM(S): 2 INJECTION INTRAMUSCULAR; INTRAVENOUS; SUBCUTANEOUS at 12:25

## 2020-01-17 RX ADMIN — Medication 25 MILLIGRAM(S): at 17:32

## 2020-01-17 RX ADMIN — Medication 100 MILLIGRAM(S): at 11:35

## 2020-01-17 RX ADMIN — Medication 650 MILLIGRAM(S): at 01:16

## 2020-01-17 RX ADMIN — Medication 1000 MILLIGRAM(S): at 09:48

## 2020-01-17 RX ADMIN — Medication 1 MILLIGRAM(S): at 11:35

## 2020-01-17 RX ADMIN — ACYCLOVIR 1 APPLICATION(S): 50 OINTMENT TOPICAL at 23:42

## 2020-01-17 RX ADMIN — ENOXAPARIN SODIUM 40 MILLIGRAM(S): 100 INJECTION SUBCUTANEOUS at 11:35

## 2020-01-17 RX ADMIN — HYDROMORPHONE HYDROCHLORIDE 0.5 MILLIGRAM(S): 2 INJECTION INTRAMUSCULAR; INTRAVENOUS; SUBCUTANEOUS at 04:58

## 2020-01-17 RX ADMIN — ATORVASTATIN CALCIUM 20 MILLIGRAM(S): 80 TABLET, FILM COATED ORAL at 23:41

## 2020-01-17 RX ADMIN — Medication 1 TABLET(S): at 11:35

## 2020-01-17 RX ADMIN — HALOPERIDOL DECANOATE 1 MILLIGRAM(S): 100 INJECTION INTRAMUSCULAR at 00:56

## 2020-01-17 RX ADMIN — HYDROMORPHONE HYDROCHLORIDE 2 MILLIGRAM(S): 2 INJECTION INTRAMUSCULAR; INTRAVENOUS; SUBCUTANEOUS at 17:13

## 2020-01-17 RX ADMIN — HYDROMORPHONE HYDROCHLORIDE 0.5 MILLIGRAM(S): 2 INJECTION INTRAMUSCULAR; INTRAVENOUS; SUBCUTANEOUS at 00:12

## 2020-01-17 RX ADMIN — CHLORHEXIDINE GLUCONATE 1 APPLICATION(S): 213 SOLUTION TOPICAL at 09:37

## 2020-01-17 RX ADMIN — HALOPERIDOL DECANOATE 1 MILLIGRAM(S): 100 INJECTION INTRAMUSCULAR at 06:40

## 2020-01-17 RX ADMIN — HYDROMORPHONE HYDROCHLORIDE 0.5 MILLIGRAM(S): 2 INJECTION INTRAMUSCULAR; INTRAVENOUS; SUBCUTANEOUS at 12:40

## 2020-01-17 RX ADMIN — Medication 1 APPLICATION(S): at 17:28

## 2020-01-17 RX ADMIN — HYDROMORPHONE HYDROCHLORIDE 0.5 MILLIGRAM(S): 2 INJECTION INTRAMUSCULAR; INTRAVENOUS; SUBCUTANEOUS at 04:43

## 2020-01-17 RX ADMIN — Medication 25 MILLIGRAM(S): at 06:40

## 2020-01-17 RX ADMIN — Medication 400 MILLIGRAM(S): at 09:33

## 2020-01-17 RX ADMIN — HYDROMORPHONE HYDROCHLORIDE 2 MILLIGRAM(S): 2 INJECTION INTRAMUSCULAR; INTRAVENOUS; SUBCUTANEOUS at 18:10

## 2020-01-17 RX ADMIN — Medication 1 APPLICATION(S): at 06:54

## 2020-01-17 RX ADMIN — Medication 650 MILLIGRAM(S): at 01:01

## 2020-01-17 NOTE — PROGRESS NOTE ADULT - SUBJECTIVE AND OBJECTIVE BOX
HPI:  Pt is a 53 yo IM with h/o ETOH abuse with multiple admissions 2 to DTs/ ETOH withdrawal. Pt admitted 2 to alcoholic gastritis and ETOH withdrawal with course complicated by DTs. Pt with CIWA >20 on 2020 despite Ativan taper. Pt transferred to the ICU; admitting dx: ETOH withdrawal/DTs; pt calm today      ## Labs:  CBC:                        13.2   3.98  )-----------( 394      ( 2020 04:22 )             40.4     Chem:      136  |  103  |  11  ----------------------------<  107<H>  3.9   |  25  |  0.70    Ca    9.2      2020 04:22  Phos  3.1       Mg     2.3         TPro  8.2  /  Alb  2.7<L>  /  TBili  0.2  /  DBili  x   /  AST  16  /  ALT  21  /  AlkPhos  55      Coags:          ## Imaging:    ## Medications:    metoprolol tartrate 25 milliGRAM(s) Oral every 12 hours      atorvastatin 20 milliGRAM(s) Oral at bedtime    enoxaparin Injectable 40 milliGRAM(s) SubCutaneous daily      acetaminophen   Tablet .. 650 milliGRAM(s) Oral every 6 hours PRN      ## Vitals:  T(C): 37.9 (20 @ 11:31), Max: 38.4 (20 @ 16:09)  HR: 80 (20 @ 12:00) (67 - 95)  BP: 138/93 (20 @ 12:00) (119/82 - 156/93)  BP(mean): 107 (20 @ 12:00) (91 - 127)  RR: 13 (20 @ 12:00) (11 - 20)  SpO2: 97% (20 @ 12:00) (95% - 100%)  Wt(kg): --  Vent:   AB-16 @ 07:01  -   @ 07:00  --------------------------------------------------------  IN: 1320 mL / OUT: 1000 mL / NET: 320 mL     @ 07:01   @ 12:58  --------------------------------------------------------  IN: 495 mL / OUT: 245 mL / NET: 250 mL          ## P/E:  Gen: lying comfortably in bed in no apparent distress  Lungs: CTA  Heart: RRR  Abd: Soft/+BS  Ext: UE edema  Neuro: Calm, less confused    CENTRAL LINE: [ ] YES [ ] NO  LOCATION:   DATE INSERTED:  REMOVE: [ ] YES [ ] NO      DUSTIN: [ ] YES [ ] NO    DATE INSERTED:  REMOVE:  [ ] YES [ ] NO      A-LINE:  [ ] YES [ ] NO  LOCATION:   DATE INSERTED:  REMOVE:  [ ] YES [ ] NO  EXPLAIN:    CODE STATUS: [x] full code  [ ] DNR  [ ] DNI  [ ] [facility]  Goals of care discussion: [ ] yes

## 2020-01-17 NOTE — PHYSICAL THERAPY INITIAL EVALUATION ADULT - BALANCE TRAINING, PT EVAL
Patient will be normal in static and dynamic standing balance with rolling walker in 5-7 days to improve safety and decrease risk of falls.

## 2020-01-17 NOTE — CHART NOTE - NSCHARTNOTEFT_GEN_A_CORE
Pt w/ ETOH abuse w/ multiple admissions ; pt w/ alcoholic gastritis ( ETOH withdrawal w/ course complicated by DT's.     Factors impacting intake: [ ] none [ ] nausea  [ ] vomiting [ ] diarrhea [ ] constipation  [ ]chewing problems [ ] swallowing issues  [ x] other: NGT feeding    1/11 - Diet Prescription: Diet, NPO with Tube Feed:   Tube Feeding Modality: Nasogastric  Vital AF  Total Volume for 24 Hours (mL): 1320  Continuous  Starting Tube Feed Rate {mL per Hour}: 30  Increase Tube Feed Rate by (mL): 10     Every 8 hours  Until Goal Tube Feed Rate (mL per Hour): 55  Tube Feed Duration (in Hours): 24  Tube Feed Start Time: 12:37  Volume Based Feeding Titration:  If the patient has achieved and tolerated the prescribed goal rate and tube feeding has been held within a 24hr period, titrate tube feeding rate based on guidelines, up to a maximum rate of 120mL/hr  Reset to Goal Rate daily at 10:00 (01-11-20 @ 12:38)    Intake: Vital AF 1.2 @ 55 ml/hr= 1320 ml, 1584 calories, 99 grams protein, 1070 ml free water, 111% RDIs -> tolerating well without residual.     Current Weight: 1/17 - 160 (72.6 kg) Edema - 1+ generalized ; 2+ (L & R) arm, leg ;  3+ both hands ; 1/11 - 161.8 (73.4 kg) Edema - 1+ generalized / 2+ generalized  % Weight Change - 1.1 % /.8 kg loss x 6 days       Pertinent Medications: MEDICATIONS  (STANDING):  atorvastatin 20 milliGRAM(s) Oral at bedtime  BACItracin   Ointment 1 Application(s) Topical two times a day  chlorhexidine 4% Liquid 1 Application(s) Topical <User Schedule>  enoxaparin Injectable 40 milliGRAM(s) SubCutaneous daily  folic acid 1 milliGRAM(s) Oral daily  metoprolol tartrate 25 milliGRAM(s) Oral every 12 hours  multivitamin 1 Tablet(s) Oral daily  thiamine 100 milliGRAM(s) Oral daily    MEDICATIONS  (PRN):  acetaminophen   Tablet .. 650 milliGRAM(s) Oral every 6 hours PRN Temp greater or equal to 38C (100.4F)  benzocaine 15 mG/menthol 3.6 mG (Sugar-Free) Lozenge 1 Lozenge Oral five times a day PRN Sore Throat    Pertinent Labs: 01-17 Na136 mmol/L Glu 107 mg/dL<H> K+ 3.9 mmol/L Cr  0.70 mg/dL BUN 11 mg/dL 01-17 Phos 3.1 mg/dL 01-17 Alb 2.7 g/dL<L>     CAPILLARY BLOOD GLUCOSE        Skin: WDL    Estimated Needs:   [x ] no change since previous assessment (1/11/20)  [ ] recalculated:     Nutrition Diagnosis:    Nutrition Diagnosis:  Nutrition diagnosis yes...     Nutrition Diagnostic Terminology #1 Inadequate Oral Intake.     Etiology Decreased ability to consume sufficient energy.     Signs/Symptoms Po intake <25% nutritional needs x 3 days, DT's, lethargy.     Goal/Expected Outcome Pt to meet  >75% energy & protein via TF; maintain stable wt +/-2#.-> Goal met      Nutrition Diagnosis is [x ] ongoing  [ ] resolved [ ] not applicable     New Nutrition Diagnosis: [x ] not applicable       Interventions:   Recommend  [ ] Change Diet To:  [ ] Nutrition Supplement  [x ] Nutrition Support - continue Vital AF 1.2 @ 55 ml/hr= 1320 ml, 1584 calories, 99 grams protein, 1070 ml free water, 111% RDIs   [ ] Other:     Monitoring and Evaluation:   [ ] PO intake [ x ] Tolerance to diet prescription [ x ] weights [ x ] labs[ x ] follow up per protocol  [ ] other: Pt w/ ETOH abuse w/ multiple admissions ; pt w/ alcoholic gastritis ( ETOH withdrawal w/ course complicated by DT's.     Factors impacting intake: [ ] none [ ] nausea  [ ] vomiting [ ] diarrhea [ ] constipation  [ ]chewing problems [ ] swallowing issues  [ x] other: NGT feeding    1/11 - Diet Prescription: Diet, NPO with Tube Feed:   Tube Feeding Modality: Nasogastric  Vital AF  Total Volume for 24 Hours (mL): 1320  Continuous  Starting Tube Feed Rate {mL per Hour}: 30  Increase Tube Feed Rate by (mL): 10     Every 8 hours  Until Goal Tube Feed Rate (mL per Hour): 55  Tube Feed Duration (in Hours): 24  Tube Feed Start Time: 12:37  Volume Based Feeding Titration:  If the patient has achieved and tolerated the prescribed goal rate and tube feeding has been held within a 24hr period, titrate tube feeding rate based on guidelines, up to a maximum rate of 120mL/hr  Reset to Goal Rate daily at 10:00 (01-11-20 @ 12:38)    Intake: Vital AF 1.2 @ 55 ml/hr= 1320 ml, 1584 calories, 99 grams protein, 1070 ml free water, 111% RDIs -> tolerating well without residual.     Current Weight: 1/17 - 160 (72.6 kg) Edema - 1+ generalized ; 2+ (L & R) arm, leg ;  3+ both hands ; 1/11 - 161.8 (73.4 kg) Edema - 1+ generalized / 2+ generalized  % Weight Change - 1.1 % /.8 kg loss x 6 days     Nutrition focused physical exam conducted ;   Subcutaneous fat loss: [wdl ] Orbital fat pads region, [wdl ]Buccal fat region, [l arm w/ bandage ]Triceps region,  [wdl ]Ribs region.  Muscle wasting: [ wdl]Temples region, [wdl ]Clavicle region, [wdl ]Shoulder region, [wdl ]Scapula region, [unable ]Interosseous region,  [2+ edema ]thigh region, [2+ edema ]Calf region      Pertinent Medications: MEDICATIONS  (STANDING):  atorvastatin 20 milliGRAM(s) Oral at bedtime  BACItracin   Ointment 1 Application(s) Topical two times a day  chlorhexidine 4% Liquid 1 Application(s) Topical <User Schedule>  enoxaparin Injectable 40 milliGRAM(s) SubCutaneous daily  folic acid 1 milliGRAM(s) Oral daily  metoprolol tartrate 25 milliGRAM(s) Oral every 12 hours  multivitamin 1 Tablet(s) Oral daily  thiamine 100 milliGRAM(s) Oral daily    MEDICATIONS  (PRN):  acetaminophen   Tablet .. 650 milliGRAM(s) Oral every 6 hours PRN Temp greater or equal to 38C (100.4F)  benzocaine 15 mG/menthol 3.6 mG (Sugar-Free) Lozenge 1 Lozenge Oral five times a day PRN Sore Throat    Pertinent Labs: 01-17 Na136 mmol/L Glu 107 mg/dL<H> K+ 3.9 mmol/L Cr  0.70 mg/dL BUN 11 mg/dL 01-17 Phos 3.1 mg/dL 01-17 Alb 2.7 g/dL<L>     CAPILLARY BLOOD GLUCOSE        Skin: WDL    Estimated Needs:   [x ] no change since previous assessment (1/11/20)  [ ] recalculated:     Nutrition Diagnosis:    Nutrition Diagnosis:  Nutrition diagnosis yes...     Nutrition Diagnostic Terminology #1 Inadequate Oral Intake.     Etiology Decreased ability to consume sufficient energy.     Signs/Symptoms Po intake <25% nutritional needs x 3 days, DT's, lethargy.     Goal/Expected Outcome Pt to meet  >75% energy & protein via TF; maintain stable wt +/-2#.-> Goal met      Nutrition Diagnosis is [x ] ongoing  [ ] resolved [ ] not applicable     New Nutrition Diagnosis: [x ] not applicable       Interventions:   Recommend  [ ] Change Diet To:  [ ] Nutrition Supplement  [x ] Nutrition Support - continue Vital AF 1.2 @ 55 ml/hr= 1320 ml, 1584 calories, 99 grams protein, 1070 ml free water, 111% RDIs   [x ] Other: As medically feasible advance diet -> Dash/TLC s/p bedside swallow eval    Monitoring and Evaluation:   [x ] PO intake [ x ] Tolerance to diet prescription [ x ] weights [ x ] labs[ x ] follow up per protocol  [ ] other: Pt seen for follow -up in CCU ; Pt w/ ETOH abuse w/ multiple admissions ; pt w/ alcoholic gastritis ( ETOH withdrawal w/ course complicated by DT's.     Factors impacting intake: [ ] none [ ] nausea  [ ] vomiting [ ] diarrhea [ ] constipation  [ ]chewing problems [ ] swallowing issues  [ x] other: NGT feeding    1/11 - Diet Prescription: Diet, NPO with Tube Feed:   Tube Feeding Modality: Nasogastric  Vital AF  Total Volume for 24 Hours (mL): 1320  Continuous  Starting Tube Feed Rate {mL per Hour}: 30  Increase Tube Feed Rate by (mL): 10     Every 8 hours  Until Goal Tube Feed Rate (mL per Hour): 55  Tube Feed Duration (in Hours): 24  Tube Feed Start Time: 12:37  Volume Based Feeding Titration:  If the patient has achieved and tolerated the prescribed goal rate and tube feeding has been held within a 24hr period, titrate tube feeding rate based on guidelines, up to a maximum rate of 120mL/hr  Reset to Goal Rate daily at 10:00 (01-11-20 @ 12:38)    Intake: Vital AF 1.2 @ 55 ml/hr= 1320 ml, 1584 calories, 99 grams protein, 1070 ml free water, 111% RDIs -> tolerating well without residual.     Current Weight: 1/17 - 160 (72.6 kg) Edema - 1+ generalized ; 2+ (L & R) arm, leg ;  3+ both hands ; 1/11 - 161.8 (73.4 kg) Edema - 1+ generalized / 2+ generalized  % Weight Change - 1.1 % /.8 kg loss x 6 days     Nutrition focused physical exam conducted ;   Subcutaneous fat loss: [wdl ] Orbital fat pads region, [wdl ]Buccal fat region, [l arm w/ bandage ]Triceps region,  [wdl ]Ribs region.  Muscle wasting: [ wdl]Temples region, [wdl ]Clavicle region, [wdl ]Shoulder region, [wdl ]Scapula region, [unable ]Interosseous region,  [2+ edema ]thigh region, [2+ edema ]Calf region      Pertinent Medications: MEDICATIONS  (STANDING):  atorvastatin 20 milliGRAM(s) Oral at bedtime  BACItracin   Ointment 1 Application(s) Topical two times a day  chlorhexidine 4% Liquid 1 Application(s) Topical <User Schedule>  enoxaparin Injectable 40 milliGRAM(s) SubCutaneous daily  folic acid 1 milliGRAM(s) Oral daily  metoprolol tartrate 25 milliGRAM(s) Oral every 12 hours  multivitamin 1 Tablet(s) Oral daily  thiamine 100 milliGRAM(s) Oral daily    MEDICATIONS  (PRN):  acetaminophen   Tablet .. 650 milliGRAM(s) Oral every 6 hours PRN Temp greater or equal to 38C (100.4F)  benzocaine 15 mG/menthol 3.6 mG (Sugar-Free) Lozenge 1 Lozenge Oral five times a day PRN Sore Throat    Pertinent Labs: 01-17 Na136 mmol/L Glu 107 mg/dL<H> K+ 3.9 mmol/L Cr  0.70 mg/dL BUN 11 mg/dL 01-17 Phos 3.1 mg/dL 01-17 Alb 2.7 g/dL<L>     CAPILLARY BLOOD GLUCOSE        Skin: WDL    Estimated Needs:   [x ] no change since previous assessment (1/11/20)  [ ] recalculated:     Nutrition Diagnosis:    Nutrition Diagnosis:  Nutrition diagnosis yes...     Nutrition Diagnostic Terminology #1 Inadequate Oral Intake.     Etiology Decreased ability to consume sufficient energy.     Signs/Symptoms Po intake <25% nutritional needs x 3 days, DT's, lethargy.     Goal/Expected Outcome Pt to meet  >75% energy & protein via TF; maintain stable wt +/-2#.-> Goal met      Nutrition Diagnosis is [x ] ongoing  [ ] resolved [ ] not applicable     New Nutrition Diagnosis: [x ] not applicable       Interventions:   Recommend  [ ] Change Diet To:  [ ] Nutrition Supplement  [x ] Nutrition Support - continue Vital AF 1.2 @ 55 ml/hr= 1320 ml, 1584 calories, 99 grams protein, 1070 ml free water, 111% RDIs   [x ] Other: As medically feasible advance diet -> Dash/TLC s/p bedside swallow eval    Monitoring and Evaluation:   [x ] PO intake [ x ] Tolerance to diet prescription [ x ] weights [ x ] labs[ x ] follow up per protocol  [ ] other:

## 2020-01-17 NOTE — PHYSICAL THERAPY INITIAL EVALUATION ADULT - PERTINENT HX OF CURRENT PROBLEM, REHAB EVAL
Patient admitted with abdominal pain and shakes after not drinking x 2 days. Patient with increased agitation, was then transferred to CCU for further management.

## 2020-01-17 NOTE — CHART NOTE - NSCHARTNOTEFT_GEN_A_CORE
HPI    54M PMH chronic ETOH abuse/dependence with hx of alcohol withdrawal s/p frequent hospital admissions, GERD, HLD, alcoholic gastritis/esophagitis, PUD, hx of hypoxic respiratory failure requiring intubation due to aspiration PNA, staph PNA presents for abdominal pain and shakes.  Patient reports that his last drink was 2 days ago and starting from yesterday he has had shakes and NBNB vomiting.  Patient reports epigastric pain like heartburn that started after he vomited.  Patient denies recent hx of seizures.  Patient has no other complaints. (08 Jan 2020 01:55) Admitted to medicine service 1/8 for alcohol gastritis and alcohol withdrawal. Pt today with worsening agitation, confused, wandering hallway attempting to run from 1:1 and security, required restraints once back in room for severe agitation, self d/red IVs. CIWA 26 with hallucinations, hand tremors. Given 16mg ativan since midnight, phenobarbital 130mg IVP, phenobarbital 260mg IVP without significant improvement in agitation. Transferred to ICU for Delirium tremens/alcohol withdrawal requiring sedation. 1/9/2020  Course complicated by DTs, elevated CIWA >20, severe agitation requiring sedation protocol. patient required IV phenobarb and haldol completed course. Patient calm today, Passed bedside swallow. NGT dc'd and PO diet ordered.  Patient now off Phenobarb level was elevated and repeat level ordered for 1/18/2020. Patient now calm. Patient seen and examined by ICU attending and stable for transfer to non monitored bed.    Report given to Dr. Mendoza hospitalist service     Lala Sutton NP-[facility]  critical care

## 2020-01-17 NOTE — PHYSICAL THERAPY INITIAL EVALUATION ADULT - ADDITIONAL COMMENTS
As per patient and corroborated with care coordination note from 12/4/19: Patient lives in a private house, +stairs to enter, with 1st floor bedroom. Patient denies owning assistive device and reports he was independent with ADLs prior to admission.

## 2020-01-17 NOTE — PHYSICAL THERAPY INITIAL EVALUATION ADULT - LEVEL OF CONSCIOUSNESS, REHAB EVAL
alert/confused at times, patient able to answer questions and then, at times, demonstrating inappropriate responses to questions

## 2020-01-17 NOTE — PHYSICAL THERAPY INITIAL EVALUATION ADULT - CRITERIA FOR SKILLED THERAPEUTIC INTERVENTIONS
anticipated discharge recommendation/risk reduction/prevention/therapy frequency/functional limitations in following categories/predicted duration of therapy intervention/impairments found/rehab potential/anticipated equipment needs at discharge

## 2020-01-18 LAB
ANION GAP SERPL CALC-SCNC: 8 MMOL/L — SIGNIFICANT CHANGE UP (ref 5–17)
BUN SERPL-MCNC: 13 MG/DL — SIGNIFICANT CHANGE UP (ref 7–23)
CALCIUM SERPL-MCNC: 9.7 MG/DL — SIGNIFICANT CHANGE UP (ref 8.5–10.1)
CHLORIDE SERPL-SCNC: 101 MMOL/L — SIGNIFICANT CHANGE UP (ref 96–108)
CO2 SERPL-SCNC: 25 MMOL/L — SIGNIFICANT CHANGE UP (ref 22–31)
CREAT SERPL-MCNC: 0.74 MG/DL — SIGNIFICANT CHANGE UP (ref 0.5–1.3)
GLUCOSE SERPL-MCNC: 82 MG/DL — SIGNIFICANT CHANGE UP (ref 70–99)
HCT VFR BLD CALC: 43.7 % — SIGNIFICANT CHANGE UP (ref 39–50)
HGB BLD-MCNC: 13.8 G/DL — SIGNIFICANT CHANGE UP (ref 13–17)
HSV+VZV DNA SPEC QL NAA+PROBE: SIGNIFICANT CHANGE UP
MAGNESIUM SERPL-MCNC: 2.3 MG/DL — SIGNIFICANT CHANGE UP (ref 1.6–2.6)
MCHC RBC-ENTMCNC: 27.9 PG — SIGNIFICANT CHANGE UP (ref 27–34)
MCHC RBC-ENTMCNC: 31.6 GM/DL — LOW (ref 32–36)
MCV RBC AUTO: 88.5 FL — SIGNIFICANT CHANGE UP (ref 80–100)
NRBC # BLD: 0 /100 WBCS — SIGNIFICANT CHANGE UP (ref 0–0)
PHENOBARB SERPL-MCNC: 56.5 UG/ML — CRITICAL HIGH (ref 15–40)
PHOSPHATE SERPL-MCNC: 2.9 MG/DL — SIGNIFICANT CHANGE UP (ref 2.5–4.5)
PLATELET # BLD AUTO: 462 K/UL — HIGH (ref 150–400)
POTASSIUM SERPL-MCNC: 4.1 MMOL/L — SIGNIFICANT CHANGE UP (ref 3.5–5.3)
POTASSIUM SERPL-SCNC: 4.1 MMOL/L — SIGNIFICANT CHANGE UP (ref 3.5–5.3)
RBC # BLD: 4.94 M/UL — SIGNIFICANT CHANGE UP (ref 4.2–5.8)
RBC # FLD: 12.7 % — SIGNIFICANT CHANGE UP (ref 10.3–14.5)
SODIUM SERPL-SCNC: 134 MMOL/L — LOW (ref 135–145)
SPECIMEN SOURCE: SIGNIFICANT CHANGE UP
WBC # BLD: 4.86 K/UL — SIGNIFICANT CHANGE UP (ref 3.8–10.5)
WBC # FLD AUTO: 4.86 K/UL — SIGNIFICANT CHANGE UP (ref 3.8–10.5)

## 2020-01-18 PROCEDURE — 99233 SBSQ HOSP IP/OBS HIGH 50: CPT

## 2020-01-18 RX ORDER — PANTOPRAZOLE SODIUM 20 MG/1
40 TABLET, DELAYED RELEASE ORAL
Refills: 0 | Status: DISCONTINUED | OUTPATIENT
Start: 2020-01-18 | End: 2020-01-19

## 2020-01-18 RX ORDER — HYDROMORPHONE HYDROCHLORIDE 2 MG/ML
2 INJECTION INTRAMUSCULAR; INTRAVENOUS; SUBCUTANEOUS ONCE
Refills: 0 | Status: DISCONTINUED | OUTPATIENT
Start: 2020-01-18 | End: 2020-01-18

## 2020-01-18 RX ORDER — TETANUS AND DIPHTHERIA TOXOIDS ADSORBED 2; 2 [LF]/.5ML; [LF]/.5ML
0.5 INJECTION INTRAMUSCULAR ONCE
Refills: 0 | Status: COMPLETED | OUTPATIENT
Start: 2020-01-18 | End: 2020-01-18

## 2020-01-18 RX ADMIN — ENOXAPARIN SODIUM 40 MILLIGRAM(S): 100 INJECTION SUBCUTANEOUS at 11:51

## 2020-01-18 RX ADMIN — Medication 650 MILLIGRAM(S): at 01:00

## 2020-01-18 RX ADMIN — Medication 100 MILLIGRAM(S): at 11:51

## 2020-01-18 RX ADMIN — ACYCLOVIR 1 APPLICATION(S): 50 OINTMENT TOPICAL at 06:03

## 2020-01-18 RX ADMIN — TETANUS AND DIPHTHERIA TOXOIDS ADSORBED 0.5 MILLILITER(S): 2; 2 INJECTION INTRAMUSCULAR at 20:04

## 2020-01-18 RX ADMIN — Medication 25 MILLIGRAM(S): at 17:35

## 2020-01-18 RX ADMIN — Medication 1 APPLICATION(S): at 06:28

## 2020-01-18 RX ADMIN — Medication 1 MILLIGRAM(S): at 11:51

## 2020-01-18 RX ADMIN — Medication 1 APPLICATION(S): at 20:00

## 2020-01-18 RX ADMIN — HYDROMORPHONE HYDROCHLORIDE 2 MILLIGRAM(S): 2 INJECTION INTRAMUSCULAR; INTRAVENOUS; SUBCUTANEOUS at 11:51

## 2020-01-18 RX ADMIN — Medication 650 MILLIGRAM(S): at 01:30

## 2020-01-18 RX ADMIN — Medication 25 MILLIGRAM(S): at 06:03

## 2020-01-18 RX ADMIN — Medication 650 MILLIGRAM(S): at 23:52

## 2020-01-18 RX ADMIN — ATORVASTATIN CALCIUM 20 MILLIGRAM(S): 80 TABLET, FILM COATED ORAL at 21:31

## 2020-01-18 RX ADMIN — BENZOCAINE AND MENTHOL 1 LOZENGE: 5; 1 LIQUID ORAL at 00:55

## 2020-01-18 RX ADMIN — Medication 1 TABLET(S): at 11:51

## 2020-01-18 RX ADMIN — ACYCLOVIR 1 APPLICATION(S): 50 OINTMENT TOPICAL at 11:53

## 2020-01-18 NOTE — PROGRESS NOTE ADULT - REASON FOR ADMISSION
epigastric pain, withdrawal

## 2020-01-18 NOTE — CONSULT NOTE ADULT - ASSESSMENT
ASSESSMENT: 54yMale with ETOH abuse, DTs, gastritis, PUD, HLD presents to the ED with ETOH intoxication. Found to be transiently hypotensive- responding to IVF and lactemia    PLAN:   Neurologic: CIWA, psych consult    Respiratory: NC prn    Cardiovascular: trend labs/ LA, home meds    Gastrointestinal/Nutrition: MVI/folate/thiamine, PPI    Genitourinary/Renal: trend BUN/Cr    Hematologic: DVT prophylaxis    Infectious Disease: RVP, flu swab    Endocrine: ISS    Disposition: Does not meet criteria for ICU admission at this time. reconsult as necessary
appears to have abrasion  blisters   gen hygiene   topical silvadine  no need for systemic antibiotics   will follow with you thanks   tetanus needed
54M PMH chronic ETOH abuse/dependence with hx of alcohol withdrawal s/p frequent hospital admissions, GERD, HLD, alcoholic gastritis/esophagitis, PUD, hx of hypoxic respiratory failure requiring intubation due to aspiration PNA, staph PNA presents for abdominal pain and shakes. Admitted initially for alcoholic gastritis and alcohol withdrawal. Course complicated by DTs, elevated CIWA >20, severe agitation requiring sedation protocol.    - will placed on phenobarbital 260mg IV q6 hours  - precedex drip initiated, wean off as tolerates  - supplement hypophosphatemia  - monitor electrolytes   - MVI/thiamine/folic acid  - lactic acidosis improving and downtrending  - IVF    Critical Care Time: 60 min

## 2020-01-18 NOTE — CONSULT NOTE ADULT - SUBJECTIVE AND OBJECTIVE BOX
HPI:  Patient is a 54 M with a PMH of  ETOH abuse and withdrawal (s/p frequent ED visits and admissions), GERD, HLD, alcoholic gastritis/esophagitis, PUD who presents for abdominal pain and shakes.  Patient reports that his last drink was 2 days ago and starting from yesteday he has had shakes and NBNB vomiting.  Patient reports epigastric pain like heartburn that started after he vomited.  Patient denies recent hx of seizures.  Patient has no other complaints. (08 Jan 2020 01:55)      PAST MEDICAL & SURGICAL HISTORY:  DTs (delirium tremens)  Substance abuse  Gastritis  EtOH dependence  Mixed hyperlipidemia  PUD (peptic ulcer disease)  No significant past surgical history      SOCHX:   tobacco,  -  alcohol    FMHX: FA/MO  - contributory       Recent Travel:    Immunizations:    Allergies    No Known Allergies    Intolerances        MEDICATIONS  (STANDING):  atorvastatin 20 milliGRAM(s) Oral at bedtime  chlorhexidine 4% Liquid 1 Application(s) Topical <User Schedule>  diphtheria/tetanus Vaccine - Adult 0.5 milliLiter(s) IntraMuscular once  enoxaparin Injectable 40 milliGRAM(s) SubCutaneous daily  folic acid 1 milliGRAM(s) Oral daily  metoprolol tartrate 25 milliGRAM(s) Oral every 12 hours  multivitamin 1 Tablet(s) Oral daily  silver sulfADIAZINE 1% Cream 1 Application(s) Topical three times a day  thiamine 100 milliGRAM(s) Oral daily    MEDICATIONS  (PRN):  acetaminophen   Tablet .. 650 milliGRAM(s) Oral every 6 hours PRN Temp greater or equal to 38C (100.4F)  benzocaine 15 mG/menthol 3.6 mG (Sugar-Free) Lozenge 1 Lozenge Oral five times a day PRN Sore Throat      REVIEW OF SYSTEMS:  CONSTITUTIONAL: No fever, weight loss, or fatigue  EYES: No eye pain, visual disturbances, or discharge  ENMT:  No difficulty hearing, tinnitus, vertigo; No sinus or throat pain  NECK: No pain or stiffness  BREASTS: No pain, masses, or nipple discharge  RESPIRATORY: No cough, wheezing, chills or hemoptysis; No shortness of breath  CARDIOVASCULAR: No chest pain, palpitations, dizziness, or leg swelling  GASTROINTESTINAL: No abdominal or epigastric pain. No nausea, vomiting, or hematemesis; No diarrhea or constipation. No melena or hematochezia.  GENITOURINARY: No dysuria, frequency, hematuria, or incontinence  NEUROLOGICAL: No headaches, memory loss, loss of strength, numbness, or tremors  SKIN: No itching, burning, rashes, or lesions   LYMPH NODES: No enlarged glands  ENDOCRINE: No heat or cold intolerance; No hair loss  MUSCULOSKELETAL: No joint pain or swelling; No muscle, back, or extremity pain  PSYCHIATRIC: No depression, anxiety, mood swings, or difficulty sleeping  HEME/LYMPH: No easy bruising, or bleeding gums  ALLERGY AND IMMUNOLOGIC: No hives or eczema    VITAL SIGNS:    T(C): 37.1 (01-18-20 @ 15:00), Max: 37.4 (01-18-20 @ 12:06)  T(F): 98.7 (01-18-20 @ 15:00), Max: 99.3 (01-18-20 @ 12:06)  HR: 74 (01-18-20 @ 17:30) (74 - 92)  BP: 149/96 (01-18-20 @ 17:30) (117/80 - 149/96)  RR: 20 (01-18-20 @ 17:30) (15 - 20)  SpO2: 97% (01-18-20 @ 17:30) (95% - 97%)    PHYSICAL EXAM:    GENERAL: NAD, well-groomed, well-developed  HEAD:  Atraumatic, Normocephalic  EYES: EOMI, PERRLA, conjunctiva and sclera clear  ENMT: No tonsillar erythema, exudates, or enlargement; Moist mucous membranes, Good dentition, No lesions  NECK: Supple, No JVD, Normal thyroid  NERVOUS SYSTEM:  Alert & Oriented X3, Good concentration; Motor Strength 5/5 B/L upper and lower extremities; DTRs 2+ intact and symmetric  CHEST/LUNG: Clear to auscultation bilaterally; No rales, rhonchi, wheezing bilaterally  HEART: Regular rate and rhythm; No murmurs, rubs, or gallops  ABDOMEN: Soft, Nontender, Nondistended; Bowel sounds present  EXTREMITIES:  2+ Peripheral Pulses, No clubbing, cyanosis, or edema  LYMPH: No lymphadenopathy noted  SKIN: No rashes or lesions  BACK: no pressor sore     LABS:                         13.8   4.86  )-----------( 462      ( 18 Jan 2020 04:12 )             43.7     01-18    134<L>  |  101  |  13  ----------------------------<  82  4.1   |  25  |  0.74    Ca    9.7      18 Jan 2020 04:12  Phos  2.9     01-18  Mg     2.3     01-18    TPro  8.2  /  Alb  2.7<L>  /  TBili  0.2  /  DBili  x   /  AST  16  /  ALT  21  /  AlkPhos  55  01-17    LIVER FUNCTIONS - ( 17 Jan 2020 04:22 )  Alb: 2.7 g/dL / Pro: 8.2 gm/dL / ALK PHOS: 55 U/L / ALT: 21 U/L / AST: 16 U/L / GGT: x                                                 Radiology: HPI:  Patient is a 54 M with a PMH of  ETOH abuse and withdrawal (s/p frequent ED visits and admissions), GERD, HLD, alcoholic gastritis/esophagitis, PUD who presents for abdominal pain and shakes.  Patient reports that his last drink was 2 days ago and starting from yesteday he has had shakes and NBNB vomiting.  Patient reports epigastric pain like heartburn that started after he vomited.  Patient denies recent hx of seizures.  Patient has no other complaints. (08 Jan 2020 01:55)  i am called to see left arm rash   had fallen on concrete road prior to admission     PAST MEDICAL & SURGICAL HISTORY:  DTs (delirium tremens)  Substance abuse  Gastritis  EtOH dependence  Mixed hyperlipidemia  PUD (peptic ulcer disease)  No significant past surgical history      SOCHX:   plus tobacco,  -excessive   alcohol    FMHX: FA/MO  -non contributory       Recent Travel:    Immunizations:    Allergies    No Known Allergies    Intolerances        MEDICATIONS  (STANDING):  atorvastatin 20 milliGRAM(s) Oral at bedtime  chlorhexidine 4% Liquid 1 Application(s) Topical <User Schedule>  diphtheria/tetanus Vaccine - Adult 0.5 milliLiter(s) IntraMuscular once  enoxaparin Injectable 40 milliGRAM(s) SubCutaneous daily  folic acid 1 milliGRAM(s) Oral daily  metoprolol tartrate 25 milliGRAM(s) Oral every 12 hours  multivitamin 1 Tablet(s) Oral daily  silver sulfADIAZINE 1% Cream 1 Application(s) Topical three times a day  thiamine 100 milliGRAM(s) Oral daily    MEDICATIONS  (PRN):  acetaminophen   Tablet .. 650 milliGRAM(s) Oral every 6 hours PRN Temp greater or equal to 38C (100.4F)  benzocaine 15 mG/menthol 3.6 mG (Sugar-Free) Lozenge 1 Lozenge Oral five times a day PRN Sore Throat      REVIEW OF SYSTEMS:  CONSTITUTIONAL: No fever,   has  weight loss,  plus fatigue  EYES: No eye pain, visual disturbances, or discharge  ENMT:  No difficulty hearing, tinnitus, vertigo; No sinus or throat pain  NECK: No pain or stiffness  RESPIRATORY: No cough, wheezing, chills or hemoptysis; No shortness of breath  CARDIOVASCULAR: No chest pain, palpitations, dizziness, or leg swelling  GASTROINTESTINAL: No abdominal or epigastric pain. No nausea, vomiting, or hematemesis; No diarrhea or constipation. No melena or hematochezia.  GENITOURINARY: No dysuria, frequency, hematuria, or incontinence  NEUROLOGICAL: No headaches, memory loss, loss of strength, numbness, or tremors  SKIN: blisters entire left arm  LYMPH NODES: No enlarged glands  ENDOCRINE: No heat or cold intolerance; No hair loss  MUSCULOSKELETAL: No joint pain or swelling; No muscle, back, or extremity pain  PSYCHIATRIC: No depression, anxiety, mood swings, or difficulty sleeping  HEME/LYMPH: No easy bruising, or bleeding gums  ALLERGY AND IMMUNOLOGIC: No hives or eczema  anxious to go home  VITAL SIGNS:    T(C): 37.1 (01-18-20 @ 15:00), Max: 37.4 (01-18-20 @ 12:06)  T(F): 98.7 (01-18-20 @ 15:00), Max: 99.3 (01-18-20 @ 12:06)  HR: 74 (01-18-20 @ 17:30) (74 - 92)  BP: 149/96 (01-18-20 @ 17:30) (117/80 - 149/96)  RR: 20 (01-18-20 @ 17:30) (15 - 20)  SpO2: 97% (01-18-20 @ 17:30) (95% - 97%)    PHYSICAL EXAM:    GENERAL: NAD, well-groomed, well-developed  HEAD:  Atraumatic, Normocephalic  EYES: EOMI, PERRLA, conjunctiva and sclera clear  ENMT: No tonsillar erythema, exudates, or enlargement; Moist mucous membranes, Good dentition, No lesions  NECK: Supple, No JVD, Normal thyroid  NERVOUS SYSTEM:  Alert & Oriented X3, Good concentration; Motor Strength 5/5 B/L upper and lower extremities; DTRs 2+ intact and symmetric  CHEST/LUNG: Clear to auscultation bilaterally; No rales, rhonchi, wheezing bilaterally  HEART: Regular rate and rhythm; No murmurs, rubs, or gallops  ABDOMEN: Soft, Nontender, Nondistended; Bowel sounds present  EXTREMITIES:  2+ Peripheral Pulses, No clubbing, cyanosis, or edema  LYMPH: No lymphadenopathy noted  SKIN: my baseline ; blisters hand and arm   BACK: no pressor sore     LABS:                         13.8   4.86  )-----------( 462      ( 18 Jan 2020 04:12 )             43.7     01-18    134<L>  |  101  |  13  ----------------------------<  82  4.1   |  25  |  0.74    Ca    9.7      18 Jan 2020 04:12  Phos  2.9     01-18  Mg     2.3     01-18    TPro  8.2  /  Alb  2.7<L>  /  TBili  0.2  /  DBili  x   /  AST  16  /  ALT  21  /  AlkPhos  55  01-17    LIVER FUNCTIONS - ( 17 Jan 2020 04:22 )  Alb: 2.7 g/dL / Pro: 8.2 gm/dL / ALK PHOS: 55 U/L / ALT: 21 U/L / AST: 16 U/L / GGT: x                                                 Radiology:

## 2020-01-18 NOTE — PROGRESS NOTE ADULT - ASSESSMENT
54M with a hx of alcohol abuse presents to ED with alcohol withdrawal and epigastric pain.  Known hx of gastritis and esophagitis due to alcoholism.  Presents with severe lactic acidosis, seen by ICU team and deemed appropriate for tele.   He became agitated, transferred to ICU for DT, CIWA  of 26, requiring sedation    A/P  Alcohol withdrawal syndrome complicated by DT.  - CIWA was 26, requiring sedation in ICU with ativan, phenobarb.  - now stable and downgraded to medsurg  - on thiamine, folate, MVI  - supportive care     Acute gastritis without hemorrhage, unspecified gastritis type.  - continue Protonix,    Dyslipidemia.    - on Lipitor.     Essential hypertension.    - on Metoprolol.     Multiple blisters left forearm.  - apply silvadene cream tid.  - local wound care.    VTE prop:  - sc lovenox     AM labs.    PT ros

## 2020-01-18 NOTE — PROGRESS NOTE ADULT - SUBJECTIVE AND OBJECTIVE BOX
Patient is a 54y old  Male who presents with a chief complaint of epigastric pain, withdrawal (17 Jan 2020 12:58), denies chest pain, SOB.       OVERNIGHT EVENTS: none    MEDICATIONS  (STANDING):  atorvastatin 20 milliGRAM(s) Oral at bedtime  chlorhexidine 4% Liquid 1 Application(s) Topical <User Schedule>  diphtheria/tetanus Vaccine - Adult 0.5 milliLiter(s) IntraMuscular once  enoxaparin Injectable 40 milliGRAM(s) SubCutaneous daily  folic acid 1 milliGRAM(s) Oral daily  metoprolol tartrate 25 milliGRAM(s) Oral every 12 hours  multivitamin 1 Tablet(s) Oral daily  thiamine 100 milliGRAM(s) Oral daily    MEDICATIONS  (PRN):  acetaminophen   Tablet .. 650 milliGRAM(s) Oral every 6 hours PRN Temp greater or equal to 38C (100.4F)  benzocaine 15 mG/menthol 3.6 mG (Sugar-Free) Lozenge 1 Lozenge Oral five times a day PRN Sore Throat        REVIEW OF SYSTEMS:  CONSTITUTIONAL: No fever,    EYES: No eye pain,    ENMT:  No difficulty hearing, tinnitus, vertigo; No sinus or throat pain  NECK: No pain or stiffness  RESPIRATORY: No cough, wheezing,   CARDIOVASCULAR: No chest pain, palpitations, dizziness, or leg swelling  GASTROINTESTINAL: No abdominal or epigastric pain.    GENITOURINARY: No dysuria,    NEUROLOGICAL: No headaches,   SKIN: Blisters left forearm.     Vital Signs Last 24 Hrs  T(C): 37.1 (18 Jan 2020 15:00), Max: 37.4 (18 Jan 2020 12:06)  T(F): 98.7 (18 Jan 2020 15:00), Max: 99.3 (18 Jan 2020 12:06)  HR: 79 (18 Jan 2020 12:34) (79 - 92)  BP: 117/80 (18 Jan 2020 12:34) (117/80 - 140/97)  BP(mean): 91 (18 Jan 2020 12:34) (91 - 110)  RR: 20 (18 Jan 2020 12:34) (15 - 20)  SpO2: 97% (18 Jan 2020 12:34) (95% - 97%)    PHYSICAL EXAM:  GENERAL: NAD, well-groomed, well-developed  HEAD:  Atraumatic, Normocephalic  EYES: EOMI, PERRLA, conjunctiva and sclera clear  ENMT: No tonsillar erythema, exudates, or enlargement; Moist mucous membranes   NECK: Supple, No JVD   NERVOUS SYSTEM:  Alert & Oriented X 3,  Motor Strength 5/5 B/L upper and lower extremities; DTRs 2+ intact and symmetric  CHEST/LUNG: Clear to auscultation  bilaterally; No rales, rhonchi, wheezing, or rubs  HEART: Regular rate and rhythm; No murmurs, rubs, or gallops  ABDOMEN: Soft, Nontender, Nondistended; Bowel sounds present  EXTREMITIES:  2+ Peripheral Pulses, No clubbing, cyanosis, or edema  LYMPH: No lymphadenopathy noted  SKIN: multiple blisters left forearm.    LABS:                        13.8   4.86  )-----------( 462      ( 18 Jan 2020 04:12 )             43.7     01-18    134<L>  |  101  |  13  ----------------------------<  82  4.1   |  25  |  0.74    Ca    9.7      18 Jan 2020 04:12  Phos  2.9     01-18  Mg     2.3     01-18    TPro  8.2  /  Alb  2.7<L>  /  TBili  0.2  /  DBili  x   /  AST  16  /  ALT  21  /  AlkPhos  55  01-17       cardiac markers     CAPILLARY BLOOD GLUCOSE        Cultures    RADIOLOGY & ADDITIONAL TESTS:    Imaging Personally Reviewed:  [ ] YES  [ ] NO    Consultant(s) Notes Reviewed:  [ ] YES  [ ] NO    Care Discussed with Consultants/Other Providers [ ] YES  [ ] NO

## 2020-01-19 ENCOUNTER — TRANSCRIPTION ENCOUNTER (OUTPATIENT)
Age: 55
End: 2020-01-19

## 2020-01-19 VITALS
OXYGEN SATURATION: 98 % | TEMPERATURE: 98 F | SYSTOLIC BLOOD PRESSURE: 116 MMHG | DIASTOLIC BLOOD PRESSURE: 85 MMHG | RESPIRATION RATE: 17 BRPM | HEART RATE: 76 BPM

## 2020-01-19 DIAGNOSIS — F10.231 ALCOHOL DEPENDENCE WITH WITHDRAWAL DELIRIUM: ICD-10-CM

## 2020-01-19 LAB
ALBUMIN SERPL ELPH-MCNC: 3.2 G/DL — LOW (ref 3.3–5)
ALP SERPL-CCNC: 54 U/L — SIGNIFICANT CHANGE UP (ref 40–120)
ALT FLD-CCNC: 31 U/L — SIGNIFICANT CHANGE UP (ref 12–78)
ANION GAP SERPL CALC-SCNC: 9 MMOL/L — SIGNIFICANT CHANGE UP (ref 5–17)
AST SERPL-CCNC: 34 U/L — SIGNIFICANT CHANGE UP (ref 15–37)
BILIRUB SERPL-MCNC: 0.2 MG/DL — SIGNIFICANT CHANGE UP (ref 0.2–1.2)
BUN SERPL-MCNC: 14 MG/DL — SIGNIFICANT CHANGE UP (ref 7–23)
CALCIUM SERPL-MCNC: 9.4 MG/DL — SIGNIFICANT CHANGE UP (ref 8.5–10.1)
CHLORIDE SERPL-SCNC: 104 MMOL/L — SIGNIFICANT CHANGE UP (ref 96–108)
CO2 SERPL-SCNC: 24 MMOL/L — SIGNIFICANT CHANGE UP (ref 22–31)
CREAT SERPL-MCNC: 0.87 MG/DL — SIGNIFICANT CHANGE UP (ref 0.5–1.3)
GLUCOSE SERPL-MCNC: 87 MG/DL — SIGNIFICANT CHANGE UP (ref 70–99)
HCT VFR BLD CALC: 45.8 % — SIGNIFICANT CHANGE UP (ref 39–50)
HGB BLD-MCNC: 14.4 G/DL — SIGNIFICANT CHANGE UP (ref 13–17)
MAGNESIUM SERPL-MCNC: 2.5 MG/DL — SIGNIFICANT CHANGE UP (ref 1.6–2.6)
MCHC RBC-ENTMCNC: 28 PG — SIGNIFICANT CHANGE UP (ref 27–34)
MCHC RBC-ENTMCNC: 31.4 GM/DL — LOW (ref 32–36)
MCV RBC AUTO: 88.9 FL — SIGNIFICANT CHANGE UP (ref 80–100)
NRBC # BLD: 0 /100 WBCS — SIGNIFICANT CHANGE UP (ref 0–0)
PHOSPHATE SERPL-MCNC: 3.5 MG/DL — SIGNIFICANT CHANGE UP (ref 2.5–4.5)
PLATELET # BLD AUTO: 519 K/UL — HIGH (ref 150–400)
POTASSIUM SERPL-MCNC: 4.7 MMOL/L — SIGNIFICANT CHANGE UP (ref 3.5–5.3)
POTASSIUM SERPL-SCNC: 4.7 MMOL/L — SIGNIFICANT CHANGE UP (ref 3.5–5.3)
PROT SERPL-MCNC: 9 GM/DL — HIGH (ref 6–8.3)
RBC # BLD: 5.15 M/UL — SIGNIFICANT CHANGE UP (ref 4.2–5.8)
RBC # FLD: 12.7 % — SIGNIFICANT CHANGE UP (ref 10.3–14.5)
SODIUM SERPL-SCNC: 137 MMOL/L — SIGNIFICANT CHANGE UP (ref 135–145)
WBC # BLD: 3.74 K/UL — LOW (ref 3.8–10.5)
WBC # FLD AUTO: 3.74 K/UL — LOW (ref 3.8–10.5)

## 2020-01-19 PROCEDURE — 99239 HOSP IP/OBS DSCHRG MGMT >30: CPT

## 2020-01-19 RX ORDER — METOPROLOL TARTRATE 50 MG
1 TABLET ORAL
Qty: 60 | Refills: 0
Start: 2020-01-19 | End: 2020-02-17

## 2020-01-19 RX ORDER — PANTOPRAZOLE SODIUM 20 MG/1
1 TABLET, DELAYED RELEASE ORAL
Qty: 30 | Refills: 0
Start: 2020-01-19 | End: 2020-02-17

## 2020-01-19 RX ORDER — THIAMINE MONONITRATE (VIT B1) 100 MG
1 TABLET ORAL
Qty: 0 | Refills: 0 | DISCHARGE
Start: 2020-01-19

## 2020-01-19 RX ORDER — FOLIC ACID 0.8 MG
1 TABLET ORAL
Qty: 0 | Refills: 0 | DISCHARGE
Start: 2020-01-19

## 2020-01-19 RX ORDER — ATORVASTATIN CALCIUM 80 MG/1
1 TABLET, FILM COATED ORAL
Qty: 0 | Refills: 0 | DISCHARGE
Start: 2020-01-19

## 2020-01-19 RX ADMIN — Medication 100 MILLIGRAM(S): at 11:47

## 2020-01-19 RX ADMIN — Medication 1 TABLET(S): at 11:47

## 2020-01-19 RX ADMIN — Medication 25 MILLIGRAM(S): at 05:13

## 2020-01-19 RX ADMIN — Medication 1 APPLICATION(S): at 05:13

## 2020-01-19 RX ADMIN — ENOXAPARIN SODIUM 40 MILLIGRAM(S): 100 INJECTION SUBCUTANEOUS at 11:46

## 2020-01-19 RX ADMIN — Medication 1 MILLIGRAM(S): at 11:47

## 2020-01-19 RX ADMIN — PANTOPRAZOLE SODIUM 40 MILLIGRAM(S): 20 TABLET, DELAYED RELEASE ORAL at 05:13

## 2020-01-19 RX ADMIN — Medication 650 MILLIGRAM(S): at 01:40

## 2020-01-19 NOTE — DISCHARGE NOTE PROVIDER - NSDCCPCAREPLAN_GEN_ALL_CORE_FT
PRINCIPAL DISCHARGE DIAGNOSIS  Diagnosis: Alcohol withdrawal syndrome without complication  Assessment and Plan of Treatment: Alcohol withdrawal syndrome without complication      SECONDARY DISCHARGE DIAGNOSES  Diagnosis: DTs (delirium tremens)  Assessment and Plan of Treatment: DTs (delirium tremens)    Diagnosis: Essential hypertension  Assessment and Plan of Treatment: Essential hypertension    Diagnosis: Dyslipidemia  Assessment and Plan of Treatment: Dyslipidemia    Diagnosis: Alcoholic gastritis  Assessment and Plan of Treatment:

## 2020-01-19 NOTE — DISCHARGE NOTE NURSING/CASE MANAGEMENT/SOCIAL WORK - PATIENT PORTAL LINK FT
You can access the FollowMyHealth Patient Portal offered by [facility] by registering at the following website: [email or web address]. By joining [employer]’s FollowMyHealth portal, you will also be able to view your health information using other applications (apps) compatible with our system.

## 2020-01-19 NOTE — DISCHARGE NOTE PROVIDER - NSDCMRMEDTOKEN_GEN_ALL_CORE_FT
atorvastatin 20 mg oral tablet: 1 tab(s) orally once a day (at bedtime)  folic acid 1 mg oral tablet: 1 tab(s) orally once a day  metoprolol tartrate 25 mg oral tablet: 1 tab(s) orally every 12 hours  Multiple Vitamins oral tablet: 1 tab(s) orally once a day  pantoprazole 40 mg oral delayed release tablet: 1 tab(s) orally once a day (before a meal)  thiamine 100 mg oral tablet: 1 tab(s) orally once a day

## 2020-01-19 NOTE — DISCHARGE NOTE PROVIDER - HOSPITAL COURSE
54M with a hx of alcohol abuse presents to ED with alcohol withdrawal and epigastric pain.  Known hx of gastritis and esophagitis due to alcoholism.  Presents with severe lactic acidosis, seen by ICU team and deemed appropriate for tele. He became agitated, transferred to ICU for DT, CIWA  of 26, requiring sedation. now stable. Acute gastritis without hemorrhage, unspecified gastritis type. continue Protonix. Dyslipidemia.  on Lipitor. Essential hypertension. on Metoprolol.     Multiple blisters left forearm. apply silvadene cream tid. local wound care.

## 2020-01-19 NOTE — DISCHARGE NOTE NURSING/CASE MANAGEMENT/SOCIAL WORK - NSDCVIVACCINE_GEN_ALL_CORE_FT
Influenza , 2019/1/31 15:53 , Ayaka Bynum (RN)  Influenza , 2019/11/10 14:13 , Laverne Lane (RN)  Td , 2020/1/18 20:04 , Yulia Vance (RN)

## 2020-01-21 LAB — VZV IGM SER-ACNC: <0.91 INDEX — SIGNIFICANT CHANGE UP (ref 0–0.9)

## 2020-01-24 DIAGNOSIS — K29.20 ALCOHOLIC GASTRITIS WITHOUT BLEEDING: ICD-10-CM
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
## 2020-01-24 DIAGNOSIS — E87.2 ACIDOSIS: ICD-10-CM

## 2020-01-24 DIAGNOSIS — Z23 ENCOUNTER FOR IMMUNIZATION: ICD-10-CM

## 2020-01-24 DIAGNOSIS — K21.0 GASTRO-ESOPHAGEAL REFLUX DISEASE WITH ESOPHAGITIS: ICD-10-CM
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
## 2020-01-24 DIAGNOSIS — F10.229 ALCOHOL DEPENDENCE WITH INTOXICATION, UNSPECIFIED: ICD-10-CM

## 2020-01-24 DIAGNOSIS — N39.0 URINARY TRACT INFECTION, SITE NOT SPECIFIED: ICD-10-CM

## 2020-01-24 DIAGNOSIS — Z71.41 ALCOHOL ABUSE COUNSELING AND SURVEILLANCE OF ALCOHOLIC: ICD-10-CM

## 2020-01-24 DIAGNOSIS — I10 ESSENTIAL (PRIMARY) HYPERTENSION: ICD-10-CM

## 2020-01-24 DIAGNOSIS — F10.231 ALCOHOL DEPENDENCE WITH WITHDRAWAL DELIRIUM: ICD-10-CM

## 2020-01-24 DIAGNOSIS — E83.39 OTHER DISORDERS OF PHOSPHORUS METABOLISM: ICD-10-CM

## 2020-01-24 DIAGNOSIS — R33.9 RETENTION OF URINE, UNSPECIFIED: ICD-10-CM

## 2020-01-24 DIAGNOSIS — E78.2 MIXED HYPERLIPIDEMIA: ICD-10-CM

## 2020-01-24 DIAGNOSIS — Z78.1 PHYSICAL RESTRAINT STATUS: ICD-10-CM

## 2020-01-24 DIAGNOSIS — S50.822A BLISTER (NONTHERMAL) OF LEFT FOREARM, INITIAL ENCOUNTER: ICD-10-CM

## 2020-01-24 DIAGNOSIS — E87.6 HYPOKALEMIA: ICD-10-CM

## 2020-02-24 ENCOUNTER — INPATIENT (INPATIENT)
Facility: HOSPITAL | Age: 55
LOS: 1 days | Discharge: LEFT BEFORE DISCHARGE | End: 2020-02-26
Attending: INTERNAL MEDICINE | Admitting: INTERNAL MEDICINE
Payer: MEDICAID

## 2020-02-24 VITALS
HEIGHT: 69 IN | OXYGEN SATURATION: 98 % | TEMPERATURE: 98 F | HEART RATE: 90 BPM | WEIGHT: 160.06 LBS | RESPIRATION RATE: 20 BRPM

## 2020-02-24 LAB
ALBUMIN SERPL ELPH-MCNC: 4.6 G/DL — SIGNIFICANT CHANGE UP (ref 3.3–5)
ALP SERPL-CCNC: 61 U/L — SIGNIFICANT CHANGE UP (ref 40–120)
ALT FLD-CCNC: 30 U/L — SIGNIFICANT CHANGE UP (ref 12–78)
ANION GAP SERPL CALC-SCNC: 21 MMOL/L — HIGH (ref 5–17)
AST SERPL-CCNC: 36 U/L — SIGNIFICANT CHANGE UP (ref 15–37)
BASOPHILS # BLD AUTO: 0.06 K/UL — SIGNIFICANT CHANGE UP (ref 0–0.2)
BASOPHILS NFR BLD AUTO: 0.6 % — SIGNIFICANT CHANGE UP (ref 0–2)
BILIRUB SERPL-MCNC: 1.1 MG/DL — SIGNIFICANT CHANGE UP (ref 0.2–1.2)
BUN SERPL-MCNC: 15 MG/DL — SIGNIFICANT CHANGE UP (ref 7–23)
CALCIUM SERPL-MCNC: 10.4 MG/DL — HIGH (ref 8.5–10.1)
CHLORIDE SERPL-SCNC: 92 MMOL/L — LOW (ref 96–108)
CO2 SERPL-SCNC: 24 MMOL/L — SIGNIFICANT CHANGE UP (ref 22–31)
CREAT SERPL-MCNC: 1.21 MG/DL — SIGNIFICANT CHANGE UP (ref 0.5–1.3)
EOSINOPHIL # BLD AUTO: 0 K/UL — SIGNIFICANT CHANGE UP (ref 0–0.5)
EOSINOPHIL NFR BLD AUTO: 0 % — SIGNIFICANT CHANGE UP (ref 0–6)
ETHANOL SERPL-MCNC: 103 MG/DL — HIGH (ref 0–10)
GLUCOSE BLDC GLUCOMTR-MCNC: 113 MG/DL — HIGH (ref 70–99)
GLUCOSE SERPL-MCNC: 130 MG/DL — HIGH (ref 70–99)
HCT VFR BLD CALC: 44.7 % — SIGNIFICANT CHANGE UP (ref 39–50)
HGB BLD-MCNC: 15.3 G/DL — SIGNIFICANT CHANGE UP (ref 13–17)
IMM GRANULOCYTES NFR BLD AUTO: 0.4 % — SIGNIFICANT CHANGE UP (ref 0–1.5)
LIDOCAIN IGE QN: 112 U/L — SIGNIFICANT CHANGE UP (ref 73–393)
LYMPHOCYTES # BLD AUTO: 1.19 K/UL — SIGNIFICANT CHANGE UP (ref 1–3.3)
LYMPHOCYTES # BLD AUTO: 11.3 % — LOW (ref 13–44)
MCHC RBC-ENTMCNC: 27.8 PG — SIGNIFICANT CHANGE UP (ref 27–34)
MCHC RBC-ENTMCNC: 34.2 GM/DL — SIGNIFICANT CHANGE UP (ref 32–36)
MCV RBC AUTO: 81.3 FL — SIGNIFICANT CHANGE UP (ref 80–100)
MONOCYTES # BLD AUTO: 0.57 K/UL — SIGNIFICANT CHANGE UP (ref 0–0.9)
MONOCYTES NFR BLD AUTO: 5.4 % — SIGNIFICANT CHANGE UP (ref 2–14)
NEUTROPHILS # BLD AUTO: 8.69 K/UL — HIGH (ref 1.8–7.4)
NEUTROPHILS NFR BLD AUTO: 82.3 % — HIGH (ref 43–77)
NRBC # BLD: 0 /100 WBCS — SIGNIFICANT CHANGE UP (ref 0–0)
PLATELET # BLD AUTO: 325 K/UL — SIGNIFICANT CHANGE UP (ref 150–400)
POTASSIUM SERPL-MCNC: 3.7 MMOL/L — SIGNIFICANT CHANGE UP (ref 3.5–5.3)
POTASSIUM SERPL-SCNC: 3.7 MMOL/L — SIGNIFICANT CHANGE UP (ref 3.5–5.3)
PROT SERPL-MCNC: 9.2 GM/DL — HIGH (ref 6–8.3)
RBC # BLD: 5.5 M/UL — SIGNIFICANT CHANGE UP (ref 4.2–5.8)
RBC # FLD: 13.4 % — SIGNIFICANT CHANGE UP (ref 10.3–14.5)
SODIUM SERPL-SCNC: 137 MMOL/L — SIGNIFICANT CHANGE UP (ref 135–145)
WBC # BLD: 10.55 K/UL — HIGH (ref 3.8–10.5)
WBC # FLD AUTO: 10.55 K/UL — HIGH (ref 3.8–10.5)

## 2020-02-24 PROCEDURE — 99285 EMERGENCY DEPT VISIT HI MDM: CPT

## 2020-02-24 PROCEDURE — 93010 ELECTROCARDIOGRAM REPORT: CPT

## 2020-02-24 RX ORDER — MORPHINE SULFATE 50 MG/1
4 CAPSULE, EXTENDED RELEASE ORAL ONCE
Refills: 0 | Status: DISCONTINUED | OUTPATIENT
Start: 2020-02-24 | End: 2020-02-24

## 2020-02-24 RX ORDER — PANTOPRAZOLE SODIUM 20 MG/1
40 TABLET, DELAYED RELEASE ORAL ONCE
Refills: 0 | Status: COMPLETED | OUTPATIENT
Start: 2020-02-24 | End: 2020-02-24

## 2020-02-24 RX ORDER — ONDANSETRON 8 MG/1
4 TABLET, FILM COATED ORAL ONCE
Refills: 0 | Status: COMPLETED | OUTPATIENT
Start: 2020-02-24 | End: 2020-02-24

## 2020-02-24 RX ORDER — SODIUM CHLORIDE 9 MG/ML
1000 INJECTION INTRAMUSCULAR; INTRAVENOUS; SUBCUTANEOUS ONCE
Refills: 0 | Status: COMPLETED | OUTPATIENT
Start: 2020-02-24 | End: 2020-02-24

## 2020-02-24 RX ORDER — LIDOCAINE 4 G/100G
10 CREAM TOPICAL ONCE
Refills: 0 | Status: COMPLETED | OUTPATIENT
Start: 2020-02-24 | End: 2020-02-24

## 2020-02-24 RX ADMIN — MORPHINE SULFATE 4 MILLIGRAM(S): 50 CAPSULE, EXTENDED RELEASE ORAL at 21:01

## 2020-02-24 RX ADMIN — PANTOPRAZOLE SODIUM 40 MILLIGRAM(S): 20 TABLET, DELAYED RELEASE ORAL at 19:55

## 2020-02-24 RX ADMIN — MORPHINE SULFATE 4 MILLIGRAM(S): 50 CAPSULE, EXTENDED RELEASE ORAL at 22:52

## 2020-02-24 RX ADMIN — Medication 30 MILLILITER(S): at 22:22

## 2020-02-24 RX ADMIN — Medication 2 MILLIGRAM(S): at 23:07

## 2020-02-24 RX ADMIN — ONDANSETRON 4 MILLIGRAM(S): 8 TABLET, FILM COATED ORAL at 19:54

## 2020-02-24 RX ADMIN — Medication 25 MILLIGRAM(S): at 22:06

## 2020-02-24 RX ADMIN — SODIUM CHLORIDE 1000 MILLILITER(S): 9 INJECTION INTRAMUSCULAR; INTRAVENOUS; SUBCUTANEOUS at 23:07

## 2020-02-24 RX ADMIN — MORPHINE SULFATE 4 MILLIGRAM(S): 50 CAPSULE, EXTENDED RELEASE ORAL at 19:53

## 2020-02-24 RX ADMIN — LIDOCAINE 10 MILLILITER(S): 4 CREAM TOPICAL at 22:22

## 2020-02-24 NOTE — ED PROVIDER NOTE - PHYSICAL EXAMINATION
Gen: no acute distress, +smells of EtOH  Cardiac: regular rate and rhythm, +S1S2  Pulm: Clear to auscultation bilaterally  Abd: soft, +ttp upper abd, nondistended, no guarding  Back: neg CVA ttp, nontender spine  Extremity: no edema, no deformity, warm and well perfused, FROM all extremities    Neuro: awake, alert, oriented x 3, sensorimotor intact

## 2020-02-24 NOTE — ED PROVIDER NOTE - CARE PLAN
Principal Discharge DX:	Gastritis Principal Discharge DX:	Gastritis  Secondary Diagnosis:	Alcohol withdrawal

## 2020-02-24 NOTE — ED ADULT TRIAGE NOTE - CHIEF COMPLAINT QUOTE
Ems states, " Pt intoxicated, drank today now with vomiting " unable to obtain bp pt not co operative will attempt in stretcher

## 2020-02-24 NOTE — ED PROVIDER NOTE - NS ED ROS FT
Constitutional: no fevers or chills  Cardiac: no palpitations, chest pain  Lungs: no shortness of breath, wheezes  Abd: + abd pain, nausea, vomiting, denies diarrhea  Genitourinary: no dysuria, increased urinary frequency, hematuria  Neurology: no sensorimotor deficits, no dizziness, no headache, no visual changes  Skin: no rashes  All other ROS negative except as per HPI

## 2020-02-24 NOTE — ED PROVIDER NOTE - NSFOLLOWUPCLINICS_GEN_ALL_ED_FT
[facility]astroenter[facility]  Gastroenterology  [address] [place] [id number]  Phone: [phone]  Fax:   Follow Up Time:

## 2020-02-24 NOTE — ED PROVIDER NOTE - PROGRESS NOTE DETAILS
Pt initially seen in ED for abd pain & sx of gastritis & dc, however while getting vitals for dc, noted pt tachycardic  w tremors.  Will give ativan & admit for withdrawal.

## 2020-02-24 NOTE — ED PROVIDER NOTE - PATIENT PORTAL LINK FT
You can access the FollowMyHealth Patient Portal offered by [facility] by registering at the following website: [email or web address]. By joining [employer]’s FollowMyHealth portal, you will also be able to view your health information using other applications (apps) compatible with our system.

## 2020-02-24 NOTE — ED PROVIDER NOTE - OBJECTIVE STATEMENT
53 yo male   last drink this moring  vomit  upper abd pain 53 yo male with PMH gastritis, PUD, alcohol abuse, DTs presents to ED for evaluation of upper abd pain with nb vomiting since this morning. Reports last drink was this morning. Denies fevers, chills, chest pain, shortness of breath. Reports pain feels like what he has had in the past. Patient is not compliant with PPI/H2 blockers.

## 2020-02-25 DIAGNOSIS — K27.9 PEPTIC ULCER, SITE UNSPECIFIED, UNSPECIFIED AS ACUTE OR CHRONIC, WITHOUT HEMORRHAGE OR PERFORATION: ICD-10-CM

## 2020-02-25 DIAGNOSIS — F10.239 ALCOHOL DEPENDENCE WITH WITHDRAWAL, UNSPECIFIED: ICD-10-CM

## 2020-02-25 DIAGNOSIS — K29.70 GASTRITIS, UNSPECIFIED, WITHOUT BLEEDING: ICD-10-CM

## 2020-02-25 DIAGNOSIS — F10.231 ALCOHOL DEPENDENCE WITH WITHDRAWAL DELIRIUM: ICD-10-CM

## 2020-02-25 DIAGNOSIS — E78.2 MIXED HYPERLIPIDEMIA: ICD-10-CM

## 2020-02-25 LAB
ALBUMIN SERPL ELPH-MCNC: 3.7 G/DL — SIGNIFICANT CHANGE UP (ref 3.3–5)
ALP SERPL-CCNC: 52 U/L — SIGNIFICANT CHANGE UP (ref 40–120)
ALT FLD-CCNC: 26 U/L — SIGNIFICANT CHANGE UP (ref 12–78)
ANION GAP SERPL CALC-SCNC: 7 MMOL/L — SIGNIFICANT CHANGE UP (ref 5–17)
AST SERPL-CCNC: 34 U/L — SIGNIFICANT CHANGE UP (ref 15–37)
BILIRUB DIRECT SERPL-MCNC: 0.25 MG/DL — HIGH (ref 0.05–0.2)
BILIRUB INDIRECT FLD-MCNC: 1.2 MG/DL — HIGH (ref 0.2–1)
BILIRUB SERPL-MCNC: 1.5 MG/DL — HIGH (ref 0.2–1.2)
BUN SERPL-MCNC: 21 MG/DL — SIGNIFICANT CHANGE UP (ref 7–23)
CALCIUM SERPL-MCNC: 8.8 MG/DL — SIGNIFICANT CHANGE UP (ref 8.5–10.1)
CHLORIDE SERPL-SCNC: 103 MMOL/L — SIGNIFICANT CHANGE UP (ref 96–108)
CO2 SERPL-SCNC: 31 MMOL/L — SIGNIFICANT CHANGE UP (ref 22–31)
CREAT SERPL-MCNC: 1.02 MG/DL — SIGNIFICANT CHANGE UP (ref 0.5–1.3)
GLUCOSE SERPL-MCNC: 93 MG/DL — SIGNIFICANT CHANGE UP (ref 70–99)
MAGNESIUM SERPL-MCNC: 1.9 MG/DL — SIGNIFICANT CHANGE UP (ref 1.6–2.6)
MAGNESIUM SERPL-MCNC: 2.3 MG/DL — SIGNIFICANT CHANGE UP (ref 1.6–2.6)
PHOSPHATE SERPL-MCNC: 2.7 MG/DL — SIGNIFICANT CHANGE UP (ref 2.5–4.5)
PHOSPHATE SERPL-MCNC: 4.4 MG/DL — SIGNIFICANT CHANGE UP (ref 2.5–4.5)
POTASSIUM SERPL-MCNC: 3.5 MMOL/L — SIGNIFICANT CHANGE UP (ref 3.5–5.3)
POTASSIUM SERPL-SCNC: 3.5 MMOL/L — SIGNIFICANT CHANGE UP (ref 3.5–5.3)
PROT SERPL-MCNC: 8.3 GM/DL — SIGNIFICANT CHANGE UP (ref 6–8.3)
SODIUM SERPL-SCNC: 141 MMOL/L — SIGNIFICANT CHANGE UP (ref 135–145)

## 2020-02-25 PROCEDURE — 12345: CPT | Mod: NC

## 2020-02-25 PROCEDURE — 71045 X-RAY EXAM CHEST 1 VIEW: CPT | Mod: 26

## 2020-02-25 PROCEDURE — 99223 1ST HOSP IP/OBS HIGH 75: CPT

## 2020-02-25 RX ORDER — ONDANSETRON 8 MG/1
4 TABLET, FILM COATED ORAL EVERY 6 HOURS
Refills: 0 | Status: DISCONTINUED | OUTPATIENT
Start: 2020-02-25 | End: 2020-02-26

## 2020-02-25 RX ORDER — PANTOPRAZOLE SODIUM 20 MG/1
40 TABLET, DELAYED RELEASE ORAL
Refills: 0 | Status: DISCONTINUED | OUTPATIENT
Start: 2020-02-25 | End: 2020-02-26

## 2020-02-25 RX ORDER — ATORVASTATIN CALCIUM 80 MG/1
20 TABLET, FILM COATED ORAL AT BEDTIME
Refills: 0 | Status: DISCONTINUED | OUTPATIENT
Start: 2020-02-25 | End: 2020-02-26

## 2020-02-25 RX ORDER — THIAMINE MONONITRATE (VIT B1) 100 MG
100 TABLET ORAL ONCE
Refills: 0 | Status: COMPLETED | OUTPATIENT
Start: 2020-02-25 | End: 2020-02-25

## 2020-02-25 RX ORDER — MORPHINE SULFATE 50 MG/1
2 CAPSULE, EXTENDED RELEASE ORAL EVERY 8 HOURS
Refills: 0 | Status: DISCONTINUED | OUTPATIENT
Start: 2020-02-25 | End: 2020-02-26

## 2020-02-25 RX ADMIN — Medication 100 MILLIGRAM(S): at 05:05

## 2020-02-25 RX ADMIN — Medication 4 MILLIGRAM(S): at 08:01

## 2020-02-25 RX ADMIN — PANTOPRAZOLE SODIUM 40 MILLIGRAM(S): 20 TABLET, DELAYED RELEASE ORAL at 06:30

## 2020-02-25 RX ADMIN — Medication 30 MILLILITER(S): at 22:01

## 2020-02-25 RX ADMIN — ONDANSETRON 4 MILLIGRAM(S): 8 TABLET, FILM COATED ORAL at 23:32

## 2020-02-25 RX ADMIN — Medication 2 MILLIGRAM(S): at 22:01

## 2020-02-25 RX ADMIN — PANTOPRAZOLE SODIUM 40 MILLIGRAM(S): 20 TABLET, DELAYED RELEASE ORAL at 17:20

## 2020-02-25 RX ADMIN — ONDANSETRON 4 MILLIGRAM(S): 8 TABLET, FILM COATED ORAL at 17:24

## 2020-02-25 RX ADMIN — Medication 4 MILLIGRAM(S): at 23:27

## 2020-02-25 RX ADMIN — Medication 4 MILLIGRAM(S): at 11:56

## 2020-02-25 RX ADMIN — MORPHINE SULFATE 2 MILLIGRAM(S): 50 CAPSULE, EXTENDED RELEASE ORAL at 06:30

## 2020-02-25 RX ADMIN — MORPHINE SULFATE 2 MILLIGRAM(S): 50 CAPSULE, EXTENDED RELEASE ORAL at 07:41

## 2020-02-25 RX ADMIN — SODIUM CHLORIDE 1000 MILLILITER(S): 9 INJECTION INTRAMUSCULAR; INTRAVENOUS; SUBCUTANEOUS at 00:34

## 2020-02-25 RX ADMIN — Medication 2 MILLIGRAM(S): at 01:22

## 2020-02-25 RX ADMIN — Medication 4 MILLIGRAM(S): at 04:40

## 2020-02-25 RX ADMIN — Medication 30 MILLILITER(S): at 10:22

## 2020-02-25 RX ADMIN — ATORVASTATIN CALCIUM 20 MILLIGRAM(S): 80 TABLET, FILM COATED ORAL at 22:01

## 2020-02-25 RX ADMIN — Medication 4 MILLIGRAM(S): at 16:36

## 2020-02-25 RX ADMIN — ONDANSETRON 4 MILLIGRAM(S): 8 TABLET, FILM COATED ORAL at 11:44

## 2020-02-25 RX ADMIN — ONDANSETRON 4 MILLIGRAM(S): 8 TABLET, FILM COATED ORAL at 06:30

## 2020-02-25 RX ADMIN — Medication 4 MILLIGRAM(S): at 20:29

## 2020-02-25 RX ADMIN — Medication 2 MILLIGRAM(S): at 18:39

## 2020-02-25 NOTE — H&P ADULT - ASSESSMENT
53 y/o male with Known significant history of ETOH abuse , PUD, DTs, Gastritis and Hyperlipidemia admitted for ETOH intoxication.

## 2020-02-25 NOTE — H&P ADULT - NSHPREVIEWOFSYSTEMS_GEN_ALL_CORE
REVIEW OF SYSTEMS:  Constitutional: Denies fever, weight loss, fatigue  Eye: Denies eye pain, visual changes, discharge, blurred vision  ENT: Denies hearing changes, tinnitus, vertigo, sinus congestion, sore throat  Neck: Denies pain or stiffness  Respiratory: Denies cough, wheezing, chills, hemoptysis, shortness of breath, difficulty breathing  Cardiovascular: Denies chest pain, palpitations, dizziness, leg swelling  Gastrointestinal: + abdominal pain, + nausea.   Genitourinary: Denies dysuria, frequency, hematuria, retention, incontinence  Neurological: Denies headaches, memory loss, loss of strength, numbness, tremors  Skin: Denies itching, burning, rashes, lesions   Endocrine: Denies heat or cold intolerance, hair loss  Musculoskeletal: Denies joint pain or swelling, back, extremity pain  Hematology: Denies easy bruising, bleeding gums  Immunologic: Denies hives or eczema

## 2020-02-25 NOTE — H&P ADULT - HISTORY OF PRESENT ILLNESS
This is a 53 y/o male with Known significant history of ETOH abuse , PUD, DTs, Gastritis and Hyperlipidemia admitted for ETOH intoxication. This patient has multiple admission within recent past for the same reason. In ED his ETOH level was 103. Pt is A & O x 3 at present answering all questions appropriately . Pt reports his last drink was yesterday and had 1 pint of hard liquor . Unable to quantify his usual intake of alcohol . Pt admits to having occasional abdominal pain and severe nausea but  at present denies any CP/SOB/Palp/dizzyness/V/D.

## 2020-02-25 NOTE — H&P ADULT - NSHPPHYSICALEXAM_GEN_ALL_CORE
PHYSICAL EXAM:  GENERAL: NAD, lying in bed comfortably  HEAD:  Atraumatic, Normocephalic  EYES: EOMI, PERRLA, conjunctiva and sclera clear  ENT: Moist mucous membranes  NECK: Supple, No JVD  CHEST/LUNG: Clear to auscultation bilaterally; No rales, rhonchi, wheezing, or rubs. Unlabored respirations  HEART: Regular rate and rhythm; No murmurs, rubs, or gallops  ABDOMEN: Bowel sounds present; Soft, + tender, Nondistended.   EXTREMITIES:  2+ Peripheral Pulses, brisk capillary refill. No clubbing, cyanosis, or edema  NERVOUS SYSTEM:  Alert & Oriented X3, speech clear. No deficits   MSK: FROM all 4 extremities, full and equal strength  SKIN: No rashes or lesions

## 2020-02-25 NOTE — CHART NOTE - NSCHARTNOTEFT_GEN_A_CORE
This is a 55 y/o male with Known significant history of ETOH abuse , PUD, DTs, Gastritis and Hyperlipidemia admitted for ETOH intoxication. This patient has multiple admission within recent past for the same reason. In ED his ETOH level was 103. Pt is A & O x 3 at present answering all questions appropriately . Pt reports his last drink was yesterday and had 1 pint of hard liquor . Unable to quantify his usual intake of alcohol . Pt admits to having occasional abdominal pain and severe nausea but  at present denies any CP/SOB/Palp/dizzyness/V/D.    Advance diet   Continue PPI  Ativan per CIWA protocol

## 2020-02-26 ENCOUNTER — TRANSCRIPTION ENCOUNTER (OUTPATIENT)
Age: 55
End: 2020-02-26

## 2020-02-26 VITALS
SYSTOLIC BLOOD PRESSURE: 110 MMHG | DIASTOLIC BLOOD PRESSURE: 73 MMHG | RESPIRATION RATE: 17 BRPM | TEMPERATURE: 98 F | OXYGEN SATURATION: 96 % | HEART RATE: 110 BPM

## 2020-02-26 LAB
ALBUMIN SERPL ELPH-MCNC: 3.6 G/DL — SIGNIFICANT CHANGE UP (ref 3.3–5)
ALP SERPL-CCNC: 52 U/L — SIGNIFICANT CHANGE UP (ref 40–120)
ALT FLD-CCNC: 33 U/L — SIGNIFICANT CHANGE UP (ref 12–78)
ANION GAP SERPL CALC-SCNC: 7 MMOL/L — SIGNIFICANT CHANGE UP (ref 5–17)
AST SERPL-CCNC: 46 U/L — HIGH (ref 15–37)
BILIRUB SERPL-MCNC: 1.5 MG/DL — HIGH (ref 0.2–1.2)
BUN SERPL-MCNC: 19 MG/DL — SIGNIFICANT CHANGE UP (ref 7–23)
CALCIUM SERPL-MCNC: 9.5 MG/DL — SIGNIFICANT CHANGE UP (ref 8.5–10.1)
CHLORIDE SERPL-SCNC: 102 MMOL/L — SIGNIFICANT CHANGE UP (ref 96–108)
CO2 SERPL-SCNC: 29 MMOL/L — SIGNIFICANT CHANGE UP (ref 22–31)
CREAT SERPL-MCNC: 1.11 MG/DL — SIGNIFICANT CHANGE UP (ref 0.5–1.3)
GLUCOSE SERPL-MCNC: 105 MG/DL — HIGH (ref 70–99)
HCT VFR BLD CALC: 42.6 % — SIGNIFICANT CHANGE UP (ref 39–50)
HGB BLD-MCNC: 13.6 G/DL — SIGNIFICANT CHANGE UP (ref 13–17)
MAGNESIUM SERPL-MCNC: 2.5 MG/DL — SIGNIFICANT CHANGE UP (ref 1.6–2.6)
MCHC RBC-ENTMCNC: 27.4 PG — SIGNIFICANT CHANGE UP (ref 27–34)
MCHC RBC-ENTMCNC: 31.9 GM/DL — LOW (ref 32–36)
MCV RBC AUTO: 85.7 FL — SIGNIFICANT CHANGE UP (ref 80–100)
NRBC # BLD: 0 /100 WBCS — SIGNIFICANT CHANGE UP (ref 0–0)
PHOSPHATE SERPL-MCNC: 3.3 MG/DL — SIGNIFICANT CHANGE UP (ref 2.5–4.5)
PLATELET # BLD AUTO: 216 K/UL — SIGNIFICANT CHANGE UP (ref 150–400)
POTASSIUM SERPL-MCNC: 3.5 MMOL/L — SIGNIFICANT CHANGE UP (ref 3.5–5.3)
POTASSIUM SERPL-SCNC: 3.5 MMOL/L — SIGNIFICANT CHANGE UP (ref 3.5–5.3)
PROT SERPL-MCNC: 8 GM/DL — SIGNIFICANT CHANGE UP (ref 6–8.3)
RBC # BLD: 4.97 M/UL — SIGNIFICANT CHANGE UP (ref 4.2–5.8)
RBC # FLD: 13.5 % — SIGNIFICANT CHANGE UP (ref 10.3–14.5)
SODIUM SERPL-SCNC: 138 MMOL/L — SIGNIFICANT CHANGE UP (ref 135–145)
WBC # BLD: 4.31 K/UL — SIGNIFICANT CHANGE UP (ref 3.8–10.5)
WBC # FLD AUTO: 4.31 K/UL — SIGNIFICANT CHANGE UP (ref 3.8–10.5)

## 2020-02-26 PROCEDURE — 99239 HOSP IP/OBS DSCHRG MGMT >30: CPT

## 2020-02-26 RX ADMIN — Medication 3 MILLIGRAM(S): at 05:20

## 2020-02-26 RX ADMIN — Medication 2 MILLIGRAM(S): at 06:08

## 2020-02-26 RX ADMIN — Medication 3 MILLIGRAM(S): at 10:23

## 2020-02-26 RX ADMIN — Medication 2 MILLIGRAM(S): at 04:05

## 2020-02-26 RX ADMIN — Medication 2 MILLIGRAM(S): at 01:04

## 2020-02-26 RX ADMIN — MORPHINE SULFATE 2 MILLIGRAM(S): 50 CAPSULE, EXTENDED RELEASE ORAL at 06:12

## 2020-02-26 RX ADMIN — Medication 2 MILLIGRAM(S): at 02:58

## 2020-02-26 RX ADMIN — MORPHINE SULFATE 2 MILLIGRAM(S): 50 CAPSULE, EXTENDED RELEASE ORAL at 07:38

## 2020-02-26 RX ADMIN — Medication 3 MILLIGRAM(S): at 02:58

## 2020-02-26 RX ADMIN — ONDANSETRON 4 MILLIGRAM(S): 8 TABLET, FILM COATED ORAL at 05:19

## 2020-02-26 RX ADMIN — PANTOPRAZOLE SODIUM 40 MILLIGRAM(S): 20 TABLET, DELAYED RELEASE ORAL at 05:19

## 2020-02-26 NOTE — DISCHARGE NOTE PROVIDER - NSDCCPCAREPLAN_GEN_ALL_CORE_FT
PRINCIPAL DISCHARGE DIAGNOSIS  Diagnosis: Gastritis  Assessment and Plan of Treatment:       SECONDARY DISCHARGE DIAGNOSES  Diagnosis: Alcohol withdrawal  Assessment and Plan of Treatment:

## 2020-02-26 NOTE — DISCHARGE NOTE PROVIDER - HOSPITAL COURSE
Patient presented with abd pain , nausea and acute alcohol withdrawal. patient was started on CIWA protocol with ativan. patient remain stable on tele.     today patient decided to leave AMA. I discussed with him risk of DTs and life threatening complications. he still decided to leave. he has capacity to make medical decisions.        1. acute alcohol withdrawal in a patient with hx of chronic alcohol dependence with     ongoing alcohol abuse     2, Alcoholic gastritis         PHYSICAL EXAM:        GENERAL: well built, well nourished    CHEST/LUNG: Clear to ausculation bilaterally, no wheezing, no crackles     HEART: Regular rate and rhythm; No murmurs, rubs    ABDOMEN: Soft, Nontender, Nondistended; Bowel sounds present    EXTREMITIES:  Moving all four extremities spontaneously, No clubbing, cyanosis, or edema    NERVOUS SYSTEM:  Grossly non focal.    Psychiatry: AAO x 3, mood is appropriate         OBJECTIVE DATA:     Vital Signs Last 24 Hrs    T(C): 36.6 (2020 10:50), Max: 37.2 (2020 11:20)    T(F): 97.9 (2020 10:50), Max: 98.9 (2020 11:20)    HR: 110 (2020 10:50) (96 - 110)    BP: 110/73 (2020 10:50) (110/73 - 136/91)    BP(mean): --    RR: 17 (2020 10:50) (17 - 18)    SpO2: 96% (2020 10:50) (94% - 97%)           Daily       Daily Weight in k.4 (2020 06:51)    LABS:                            13.6     4.31  )-----------( 216      ( 2020 06:59 )               42.6                       138  |  102  |  19    ----------------------------<  105<H>    3.5   |  29  |  1.11        Ca    9.5      2020 07:00    Phos  3.3         Mg     2.5             TPro  8.0  /  Alb  3.6  /  TBili  1.5<H>  /  DBili  x   /  AST  46<H>  /  ALT  33  /  AlkPhos  52  02-26                          CAPILLARY BLOOD GLUCOSE            POCT Blood Glucose.: 113 mg/dL (2020 16:10)                DC TIME SPENT BY ME EXCLUDING BILLABLE PROCEDURES 38 mins

## 2020-03-02 DIAGNOSIS — F10.231 ALCOHOL DEPENDENCE WITH WITHDRAWAL DELIRIUM: ICD-10-CM

## 2020-03-02 DIAGNOSIS — E78.2 MIXED HYPERLIPIDEMIA: ICD-10-CM

## 2020-03-02 DIAGNOSIS — K29.20 ALCOHOLIC GASTRITIS WITHOUT BLEEDING: ICD-10-CM

## 2020-03-02 DIAGNOSIS — R00.0 TACHYCARDIA, UNSPECIFIED: ICD-10-CM

## 2020-03-02 DIAGNOSIS — F10.220 ALCOHOL DEPENDENCE WITH INTOXICATION, UNCOMPLICATED: ICD-10-CM
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
## 2020-03-02 DIAGNOSIS — K27.9 PEPTIC ULCER, SITE UNSPECIFIED, UNSPECIFIED AS ACUTE OR CHRONIC, WITHOUT HEMORRHAGE OR PERFORATION: ICD-10-CM

## 2020-03-03 ENCOUNTER — INPATIENT (INPATIENT)
Facility: HOSPITAL | Age: 55
LOS: 3 days | Discharge: HOME | End: 2020-03-07
Attending: HOSPITALIST | Admitting: HOSPITALIST
Payer: MEDICAID

## 2020-03-03 VITALS
SYSTOLIC BLOOD PRESSURE: 121 MMHG | HEART RATE: 145 BPM | DIASTOLIC BLOOD PRESSURE: 86 MMHG | OXYGEN SATURATION: 99 % | TEMPERATURE: 98 F | RESPIRATION RATE: 20 BRPM | WEIGHT: 169.98 LBS | HEIGHT: 69 IN

## 2020-03-03 DIAGNOSIS — F10.230 ALCOHOL DEPENDENCE WITH WITHDRAWAL, UNCOMPLICATED: ICD-10-CM

## 2020-03-03 DIAGNOSIS — N17.9 ACUTE KIDNEY FAILURE, UNSPECIFIED: ICD-10-CM

## 2020-03-03 DIAGNOSIS — K29.21 ALCOHOLIC GASTRITIS WITH BLEEDING: ICD-10-CM

## 2020-03-03 DIAGNOSIS — E78.2 MIXED HYPERLIPIDEMIA: ICD-10-CM

## 2020-03-03 DIAGNOSIS — E87.6 HYPOKALEMIA: ICD-10-CM

## 2020-03-03 LAB
ALBUMIN SERPL ELPH-MCNC: 3.6 G/DL — SIGNIFICANT CHANGE UP (ref 3.3–5)
ALBUMIN SERPL ELPH-MCNC: 3.8 G/DL — SIGNIFICANT CHANGE UP (ref 3.3–5)
ALP SERPL-CCNC: 47 U/L — SIGNIFICANT CHANGE UP (ref 40–120)
ALP SERPL-CCNC: 55 U/L — SIGNIFICANT CHANGE UP (ref 40–120)
ALT FLD-CCNC: 49 U/L — SIGNIFICANT CHANGE UP (ref 12–78)
ALT FLD-CCNC: 60 U/L — SIGNIFICANT CHANGE UP (ref 12–78)
ANION GAP SERPL CALC-SCNC: 18 MMOL/L — HIGH (ref 5–17)
ANION GAP SERPL CALC-SCNC: 7 MMOL/L — SIGNIFICANT CHANGE UP (ref 5–17)
APTT BLD: 26.8 SEC — LOW (ref 27.5–36.3)
AST SERPL-CCNC: 45 U/L — HIGH (ref 15–37)
AST SERPL-CCNC: 53 U/L — HIGH (ref 15–37)
BASOPHILS # BLD AUTO: 0.01 K/UL — SIGNIFICANT CHANGE UP (ref 0–0.2)
BASOPHILS NFR BLD AUTO: 0.1 % — SIGNIFICANT CHANGE UP (ref 0–2)
BILIRUB DIRECT SERPL-MCNC: 0.11 MG/DL — SIGNIFICANT CHANGE UP (ref 0.05–0.2)
BILIRUB INDIRECT FLD-MCNC: 0.4 MG/DL — SIGNIFICANT CHANGE UP (ref 0.2–1)
BILIRUB SERPL-MCNC: 0.4 MG/DL — SIGNIFICANT CHANGE UP (ref 0.2–1.2)
BILIRUB SERPL-MCNC: 0.5 MG/DL — SIGNIFICANT CHANGE UP (ref 0.2–1.2)
BUN SERPL-MCNC: 12 MG/DL — SIGNIFICANT CHANGE UP (ref 7–23)
BUN SERPL-MCNC: 14 MG/DL — SIGNIFICANT CHANGE UP (ref 7–23)
CALCIUM SERPL-MCNC: 8.9 MG/DL — SIGNIFICANT CHANGE UP (ref 8.5–10.1)
CALCIUM SERPL-MCNC: 9.7 MG/DL — SIGNIFICANT CHANGE UP (ref 8.5–10.1)
CHLORIDE SERPL-SCNC: 94 MMOL/L — LOW (ref 96–108)
CHLORIDE SERPL-SCNC: 99 MMOL/L — SIGNIFICANT CHANGE UP (ref 96–108)
CO2 SERPL-SCNC: 26 MMOL/L — SIGNIFICANT CHANGE UP (ref 22–31)
CO2 SERPL-SCNC: 30 MMOL/L — SIGNIFICANT CHANGE UP (ref 22–31)
CREAT SERPL-MCNC: 1.03 MG/DL — SIGNIFICANT CHANGE UP (ref 0.5–1.3)
CREAT SERPL-MCNC: 1.72 MG/DL — HIGH (ref 0.5–1.3)
EOSINOPHIL # BLD AUTO: 0 K/UL — SIGNIFICANT CHANGE UP (ref 0–0.5)
EOSINOPHIL NFR BLD AUTO: 0 % — SIGNIFICANT CHANGE UP (ref 0–6)
ETHANOL SERPL-MCNC: 13 MG/DL — HIGH (ref 0–10)
GLUCOSE SERPL-MCNC: 105 MG/DL — HIGH (ref 70–99)
GLUCOSE SERPL-MCNC: 205 MG/DL — HIGH (ref 70–99)
HCT VFR BLD CALC: 41.8 % — SIGNIFICANT CHANGE UP (ref 39–50)
HGB BLD-MCNC: 14.5 G/DL — SIGNIFICANT CHANGE UP (ref 13–17)
IMM GRANULOCYTES NFR BLD AUTO: 1 % — SIGNIFICANT CHANGE UP (ref 0–1.5)
INR BLD: 1.04 RATIO — SIGNIFICANT CHANGE UP (ref 0.88–1.16)
LIDOCAIN IGE QN: 158 U/L — SIGNIFICANT CHANGE UP (ref 73–393)
LYMPHOCYTES # BLD AUTO: 0.41 K/UL — LOW (ref 1–3.3)
LYMPHOCYTES # BLD AUTO: 5.3 % — LOW (ref 13–44)
MAGNESIUM SERPL-MCNC: 1.7 MG/DL — SIGNIFICANT CHANGE UP (ref 1.6–2.6)
MCHC RBC-ENTMCNC: 28.4 PG — SIGNIFICANT CHANGE UP (ref 27–34)
MCHC RBC-ENTMCNC: 34.7 GM/DL — SIGNIFICANT CHANGE UP (ref 32–36)
MCV RBC AUTO: 82 FL — SIGNIFICANT CHANGE UP (ref 80–100)
MONOCYTES # BLD AUTO: 0.68 K/UL — SIGNIFICANT CHANGE UP (ref 0–0.9)
MONOCYTES NFR BLD AUTO: 8.8 % — SIGNIFICANT CHANGE UP (ref 2–14)
NEUTROPHILS # BLD AUTO: 6.56 K/UL — SIGNIFICANT CHANGE UP (ref 1.8–7.4)
NEUTROPHILS NFR BLD AUTO: 84.8 % — HIGH (ref 43–77)
NRBC # BLD: 0 /100 WBCS — SIGNIFICANT CHANGE UP (ref 0–0)
PHOSPHATE SERPL-MCNC: 3.4 MG/DL — SIGNIFICANT CHANGE UP (ref 2.5–4.5)
PLATELET # BLD AUTO: 310 K/UL — SIGNIFICANT CHANGE UP (ref 150–400)
POTASSIUM SERPL-MCNC: 3 MMOL/L — LOW (ref 3.5–5.3)
POTASSIUM SERPL-MCNC: 3.3 MMOL/L — LOW (ref 3.5–5.3)
POTASSIUM SERPL-SCNC: 3 MMOL/L — LOW (ref 3.5–5.3)
POTASSIUM SERPL-SCNC: 3.3 MMOL/L — LOW (ref 3.5–5.3)
PROT SERPL-MCNC: 7.9 GM/DL — SIGNIFICANT CHANGE UP (ref 6–8.3)
PROT SERPL-MCNC: 9.2 GM/DL — HIGH (ref 6–8.3)
PROTHROM AB SERPL-ACNC: 11.7 SEC — SIGNIFICANT CHANGE UP (ref 10–12.9)
RBC # BLD: 5.1 M/UL — SIGNIFICANT CHANGE UP (ref 4.2–5.8)
RBC # FLD: 14 % — SIGNIFICANT CHANGE UP (ref 10.3–14.5)
SODIUM SERPL-SCNC: 136 MMOL/L — SIGNIFICANT CHANGE UP (ref 135–145)
SODIUM SERPL-SCNC: 138 MMOL/L — SIGNIFICANT CHANGE UP (ref 135–145)
TROPONIN I SERPL-MCNC: <.015 NG/ML — SIGNIFICANT CHANGE UP (ref 0.01–0.04)
WBC # BLD: 7.74 K/UL — SIGNIFICANT CHANGE UP (ref 3.8–10.5)
WBC # FLD AUTO: 7.74 K/UL — SIGNIFICANT CHANGE UP (ref 3.8–10.5)

## 2020-03-03 PROCEDURE — 93010 ELECTROCARDIOGRAM REPORT: CPT

## 2020-03-03 PROCEDURE — 71045 X-RAY EXAM CHEST 1 VIEW: CPT | Mod: 26

## 2020-03-03 PROCEDURE — 99223 1ST HOSP IP/OBS HIGH 75: CPT

## 2020-03-03 PROCEDURE — 99285 EMERGENCY DEPT VISIT HI MDM: CPT

## 2020-03-03 RX ORDER — DIAZEPAM 5 MG
5 TABLET ORAL ONCE
Refills: 0 | Status: DISCONTINUED | OUTPATIENT
Start: 2020-03-03 | End: 2020-03-03

## 2020-03-03 RX ORDER — FOLIC ACID 0.8 MG
1 TABLET ORAL DAILY
Refills: 0 | Status: DISCONTINUED | OUTPATIENT
Start: 2020-03-03 | End: 2020-03-07

## 2020-03-03 RX ORDER — ACETAMINOPHEN 500 MG
1000 TABLET ORAL ONCE
Refills: 0 | Status: COMPLETED | OUTPATIENT
Start: 2020-03-03 | End: 2020-03-03

## 2020-03-03 RX ORDER — MORPHINE SULFATE 50 MG/1
2 CAPSULE, EXTENDED RELEASE ORAL ONCE
Refills: 0 | Status: DISCONTINUED | OUTPATIENT
Start: 2020-03-03 | End: 2020-03-03

## 2020-03-03 RX ORDER — SODIUM CHLORIDE 9 MG/ML
1000 INJECTION, SOLUTION INTRAVENOUS
Refills: 0 | Status: DISCONTINUED | OUTPATIENT
Start: 2020-03-03 | End: 2020-03-03

## 2020-03-03 RX ORDER — METOPROLOL TARTRATE 50 MG
25 TABLET ORAL
Refills: 0 | Status: DISCONTINUED | OUTPATIENT
Start: 2020-03-03 | End: 2020-03-07

## 2020-03-03 RX ORDER — ONDANSETRON 8 MG/1
4 TABLET, FILM COATED ORAL ONCE
Refills: 0 | Status: COMPLETED | OUTPATIENT
Start: 2020-03-03 | End: 2020-03-03

## 2020-03-03 RX ORDER — FAMOTIDINE 10 MG/ML
20 INJECTION INTRAVENOUS ONCE
Refills: 0 | Status: DISCONTINUED | OUTPATIENT
Start: 2020-03-03 | End: 2020-03-03

## 2020-03-03 RX ORDER — PANTOPRAZOLE SODIUM 20 MG/1
40 TABLET, DELAYED RELEASE ORAL EVERY 12 HOURS
Refills: 0 | Status: DISCONTINUED | OUTPATIENT
Start: 2020-03-03 | End: 2020-03-07

## 2020-03-03 RX ORDER — THIAMINE MONONITRATE (VIT B1) 100 MG
100 TABLET ORAL DAILY
Refills: 0 | Status: DISCONTINUED | OUTPATIENT
Start: 2020-03-03 | End: 2020-03-07

## 2020-03-03 RX ORDER — FAMOTIDINE 10 MG/ML
20 INJECTION INTRAVENOUS ONCE
Refills: 0 | Status: COMPLETED | OUTPATIENT
Start: 2020-03-03 | End: 2020-03-03

## 2020-03-03 RX ORDER — DEXTROSE MONOHYDRATE, SODIUM CHLORIDE, AND POTASSIUM CHLORIDE 50; .745; 4.5 G/1000ML; G/1000ML; G/1000ML
1000 INJECTION, SOLUTION INTRAVENOUS
Refills: 0 | Status: DISCONTINUED | OUTPATIENT
Start: 2020-03-03 | End: 2020-03-07

## 2020-03-03 RX ORDER — SODIUM CHLORIDE 9 MG/ML
1000 INJECTION INTRAMUSCULAR; INTRAVENOUS; SUBCUTANEOUS ONCE
Refills: 0 | Status: COMPLETED | OUTPATIENT
Start: 2020-03-03 | End: 2020-03-03

## 2020-03-03 RX ORDER — PANTOPRAZOLE SODIUM 20 MG/1
40 TABLET, DELAYED RELEASE ORAL
Refills: 0 | Status: DISCONTINUED | OUTPATIENT
Start: 2020-03-03 | End: 2020-03-03

## 2020-03-03 RX ORDER — ONDANSETRON 8 MG/1
4 TABLET, FILM COATED ORAL EVERY 6 HOURS
Refills: 0 | Status: DISCONTINUED | OUTPATIENT
Start: 2020-03-03 | End: 2020-03-07

## 2020-03-03 RX ORDER — ATORVASTATIN CALCIUM 80 MG/1
20 TABLET, FILM COATED ORAL AT BEDTIME
Refills: 0 | Status: DISCONTINUED | OUTPATIENT
Start: 2020-03-03 | End: 2020-03-07

## 2020-03-03 RX ADMIN — Medication 1000 MILLIGRAM(S): at 22:50

## 2020-03-03 RX ADMIN — MORPHINE SULFATE 2 MILLIGRAM(S): 50 CAPSULE, EXTENDED RELEASE ORAL at 17:18

## 2020-03-03 RX ADMIN — Medication 100 MILLIGRAM(S): at 15:48

## 2020-03-03 RX ADMIN — Medication 2 MILLIGRAM(S): at 15:44

## 2020-03-03 RX ADMIN — SODIUM CHLORIDE 1000 MILLILITER(S): 9 INJECTION INTRAMUSCULAR; INTRAVENOUS; SUBCUTANEOUS at 12:02

## 2020-03-03 RX ADMIN — ATORVASTATIN CALCIUM 20 MILLIGRAM(S): 80 TABLET, FILM COATED ORAL at 22:31

## 2020-03-03 RX ADMIN — MORPHINE SULFATE 2 MILLIGRAM(S): 50 CAPSULE, EXTENDED RELEASE ORAL at 17:03

## 2020-03-03 RX ADMIN — Medication 2 MILLIGRAM(S): at 20:28

## 2020-03-03 RX ADMIN — Medication 1 MILLIGRAM(S): at 15:48

## 2020-03-03 RX ADMIN — PANTOPRAZOLE SODIUM 40 MILLIGRAM(S): 20 TABLET, DELAYED RELEASE ORAL at 17:03

## 2020-03-03 RX ADMIN — Medication 400 MILLIGRAM(S): at 22:32

## 2020-03-03 RX ADMIN — Medication 25 MILLIGRAM(S): at 17:03

## 2020-03-03 RX ADMIN — Medication 30 MILLILITER(S): at 12:02

## 2020-03-03 RX ADMIN — FAMOTIDINE 20 MILLIGRAM(S): 10 INJECTION INTRAVENOUS at 11:10

## 2020-03-03 RX ADMIN — Medication 5 MILLIGRAM(S): at 11:11

## 2020-03-03 RX ADMIN — ONDANSETRON 4 MILLIGRAM(S): 8 TABLET, FILM COATED ORAL at 11:10

## 2020-03-03 RX ADMIN — Medication 1 MILLIGRAM(S): at 12:04

## 2020-03-03 RX ADMIN — Medication 1 TABLET(S): at 15:47

## 2020-03-03 RX ADMIN — Medication 202 MILLIGRAM(S): at 12:04

## 2020-03-03 RX ADMIN — ONDANSETRON 4 MILLIGRAM(S): 8 TABLET, FILM COATED ORAL at 20:28

## 2020-03-03 NOTE — H&P ADULT - HISTORY OF PRESENT ILLNESS
Pt is a 55 yo gentleman with a pmhx of etoh abuse who presents to the ED with vomiting and abdominal pain. Started earlier this morning. Has not had any alcohol since yesterday. Has had hx of alcohol withdrawal in the past. No chest pain, no sob, no fevers, no dysuria. Pt is a 55 yo gentleman with a pmhx of etoh abuse who presents to the ED with vomiting and abdominal pain. Started earlier this morning. Has not had any alcohol since yesterday. Has had hx of alcohol withdrawal in the past. No chest pain, no sob, no fevers, no dysuria before that he binged on alchohol for four days - occasionally the vomitus is blood tinged

## 2020-03-03 NOTE — PATIENT PROFILE ADULT - HAS THE PATIENT EXPERIENCED ANY OF THE FOLLOWING WITHIN THE WEEK PRIOR TO ADMISSION?
This note is for the purpose of making the H&P performed in clinic within the last 30 days available in the hospital surgical encounter.   
no

## 2020-03-03 NOTE — ED PROVIDER NOTE - CLINICAL SUMMARY MEDICAL DECISION MAKING FREE TEXT BOX
Ddx: Alcohol withdrawal/ alcoholic gastritis/ pancreatitis/ no abdominal tenderness or guarding to suggest surgical abdomen.   Plan: Cbc, cmp, lipase, fluids, valium, admit

## 2020-03-03 NOTE — H&P ADULT - ASSESSMENT
54 male with alchohol abuse with elevated ciwa with vomiting and tremors     IMPROVE VTE Individual Risk Assessment    RISK                                                          Points  [] Previous VTE                                           3  [] Thrombophilia                                        2  [] Lower limb paralysis                              2   [] Current Cancer                                       2   [] Immobilization > 24 hrs                        1  [] ICU/CCU stay > 24 hours                       1  [] Age > 60                                                   1    IMPROVE VTE Score:0

## 2020-03-03 NOTE — PATIENT PROFILE ADULT - LIVING ENVIRONMENT
Onset: 12/25/17  Location / description: wife reports that pt was discharged on Sat from hospital after surgery.  Last several hours pt has been complaining of the chills and has an oral temp of 101.5, checked temp twice.    Precipitating Factors: recent surgery  Pain Scale (1 - 10), 10 highest: surgical pain: 4  Associated Symptoms: see above  What  improves / worsens symptoms: none  Symptom specific medications: none  Recent Care: 12/25/17 home care visit  (12/15/17 [facility] surgery)    [place] [id number]    Reason for Disposition  • Fever > 100.5 F (38.1 C)    Protocols used: POST-OP SYMPTOMS AND [id number]      
Regarding: A@H--  Temp of 101.6, Judith  ----- Message from Megan Beltran sent at 12/25/2017  7:21 PM CST -----  Patient Name: Derek Hirsch  Caller: Wife, Kaity Hirsch  Call Back #:[phone]  Services provided: Homecare  Are you currently at (or near) your place of residence? Yes [facility] [id number]   Reason for call: Temp of 101.6, Judith  Call Center Account #: [facility] [place] [id number]    
no

## 2020-03-03 NOTE — CONSULT NOTE ADULT - SUBJECTIVE AND OBJECTIVE BOX
full consult dictated    Plan:  Pt admitted with recent alcohol binge - with associated N/V and abd pain.  Prior CT shows hepatic steatosis.  Pt with alcohol withdrawal symptoms and GASTRITIS.  PT WILL BE GIVEN A CLEAR LIQUID DIET AS TOLERATED.  CONTINUE IV PROTONIX.  ON ATIVAN FOR WITHDRAWAL SYMPTOMS.  WILL FOLLOW.

## 2020-03-03 NOTE — H&P ADULT - NSHPPHYSICALEXAM_GEN_ALL_CORE
ICU Vital Signs Last 24 Hrs  T(C): 36.6 (03 Mar 2020 10:02), Max: 36.6 (03 Mar 2020 10:02)  T(F): 97.9 (03 Mar 2020 10:02), Max: 97.9 (03 Mar 2020 10:02)  HR: 106 (03 Mar 2020 13:12) (106 - 145)  BP: 126/57 (03 Mar 2020 13:12) (115/76 - 126/57)  BP(mean): --  ABP: --  ABP(mean): --  RR: 16 (03 Mar 2020 13:12) (16 - 20)  SpO2: 99% (03 Mar 2020 13:12) (98% - 99%)  GENERAL: NAD well-developed  HEAD:  Atraumatic, Normocephalic  EYES: EOMI, PERRLA, conjunctiva and sclera clear  ENMT: No tonsillar erythema, exudates, or enlargement; Moist mucous membranes, Good dentition, No lesions  NECK: Supple, No JVD, Normal thyroid  NERVOUS SYSTEM:  Alert & Oriented X3, Good concentration; Motor Strength 5/5 B/L upper and lower extremities; DTRs 2+ intact and symmetric  CHEST/LUNG: Clear to percussion bilaterally; No rales, rhonchi, wheezing, or rubs  HEART: Regular rate and rhythm; No murmurs, rubs, or gallops  ABDOMEN: Soft, Nontender, Nondistended; Bowel sounds present  EXTREMITIES:  2+ Peripheral Pulses, No clubbing, cyanosis, or edema  LYMPH: No lymphadenopathy   SKIN: No rashes or lesions ICU Vital Signs Last 24 Hrs  T(C): 36.6 (03 Mar 2020 10:02), Max: 36.6 (03 Mar 2020 10:02)  T(F): 97.9 (03 Mar 2020 10:02), Max: 97.9 (03 Mar 2020 10:02)  HR: 106 (03 Mar 2020 13:12) (106 - 145)  BP: 126/57 (03 Mar 2020 13:12) (115/76 - 126/57)  BP(mean): --  ABP: --  ABP(mean): --  RR: 16 (03 Mar 2020 13:12) (16 - 20)  SpO2: 99% (03 Mar 2020 13:12) (98% - 99%)  GENERAL: NAD well-developed  HEAD:  Atraumatic, Normocephalic  EYES: EOMI, PERRLA, conjunctiva and sclera clear  ENMT: No tonsillar erythema, exudates, or enlargement; Moist mucous membranes, Good dentition, No lesions  NECK: Supple, No JVD, Normal thyroid  NERVOUS SYSTEM:  Alert & Oriented X3, Good concentration; Motor Strength 5/5 B/L upper and lower extremities; DTRs 2+ intact and symmetric  CHEST/LUNG: Clear to percussion bilaterally; No rales, rhonchi, wheezing, or rubs  HEART: fast  rate and rhythm; No murmurs, rubs, or gallops  ABDOMEN: Soft, mild tender, Nondistended; Bowel sounds present  EXTREMITIES:  2+ Peripheral Pulses, No clubbing, cyanosis, or edema mild tremors   LYMPH: No lymphadenopathy   SKIN: No rashes or lesions

## 2020-03-03 NOTE — ED PROVIDER NOTE - OBJECTIVE STATEMENT
Pt is a 53 yo gentleman with a pmhx of etoh abuse who presents to the ED with vomiting and abdominal pain. Started earlier this morning. Has not had any alcohol since yesterday. Has had hx of alcohol withdrawal in the past. No chest pain, no sob, no fevers, no dysuria.

## 2020-03-03 NOTE — ED ADULT NURSE NOTE - OBJECTIVE STATEMENT
pt A&Ox4 with c/o of abdominal pain , nausea, vomiting x3 days. Denies chest pain. PMH HTN, Hyperlipidemia. pt states he drinks a bottle of vodka everyday.

## 2020-03-04 LAB
ANION GAP SERPL CALC-SCNC: 8 MMOL/L — SIGNIFICANT CHANGE UP (ref 5–17)
BUN SERPL-MCNC: 11 MG/DL — SIGNIFICANT CHANGE UP (ref 7–23)
CALCIUM SERPL-MCNC: 8.2 MG/DL — LOW (ref 8.5–10.1)
CHLORIDE SERPL-SCNC: 103 MMOL/L — SIGNIFICANT CHANGE UP (ref 96–108)
CO2 SERPL-SCNC: 30 MMOL/L — SIGNIFICANT CHANGE UP (ref 22–31)
CREAT SERPL-MCNC: 0.94 MG/DL — SIGNIFICANT CHANGE UP (ref 0.5–1.3)
GLUCOSE SERPL-MCNC: 100 MG/DL — HIGH (ref 70–99)
HCT VFR BLD CALC: 37.1 % — LOW (ref 39–50)
HGB BLD-MCNC: 12 G/DL — LOW (ref 13–17)
MAGNESIUM SERPL-MCNC: 1.8 MG/DL — SIGNIFICANT CHANGE UP (ref 1.6–2.6)
MCHC RBC-ENTMCNC: 27.6 PG — SIGNIFICANT CHANGE UP (ref 27–34)
MCHC RBC-ENTMCNC: 32.3 GM/DL — SIGNIFICANT CHANGE UP (ref 32–36)
MCV RBC AUTO: 85.5 FL — SIGNIFICANT CHANGE UP (ref 80–100)
NRBC # BLD: 0 /100 WBCS — SIGNIFICANT CHANGE UP (ref 0–0)
PHOSPHATE SERPL-MCNC: 2.5 MG/DL — SIGNIFICANT CHANGE UP (ref 2.5–4.5)
PLATELET # BLD AUTO: 236 K/UL — SIGNIFICANT CHANGE UP (ref 150–400)
POTASSIUM SERPL-MCNC: 2.8 MMOL/L — CRITICAL LOW (ref 3.5–5.3)
POTASSIUM SERPL-SCNC: 2.8 MMOL/L — CRITICAL LOW (ref 3.5–5.3)
RBC # BLD: 4.34 M/UL — SIGNIFICANT CHANGE UP (ref 4.2–5.8)
RBC # FLD: 14.1 % — SIGNIFICANT CHANGE UP (ref 10.3–14.5)
SODIUM SERPL-SCNC: 141 MMOL/L — SIGNIFICANT CHANGE UP (ref 135–145)
WBC # BLD: 3.18 K/UL — LOW (ref 3.8–10.5)
WBC # FLD AUTO: 3.18 K/UL — LOW (ref 3.8–10.5)

## 2020-03-04 PROCEDURE — 99232 SBSQ HOSP IP/OBS MODERATE 35: CPT

## 2020-03-04 RX ORDER — ACETAMINOPHEN 500 MG
650 TABLET ORAL EVERY 6 HOURS
Refills: 0 | Status: DISCONTINUED | OUTPATIENT
Start: 2020-03-04 | End: 2020-03-07

## 2020-03-04 RX ORDER — SUCRALFATE 1 G
1 TABLET ORAL
Refills: 0 | Status: DISCONTINUED | OUTPATIENT
Start: 2020-03-04 | End: 2020-03-07

## 2020-03-04 RX ORDER — POTASSIUM CHLORIDE 20 MEQ
40 PACKET (EA) ORAL ONCE
Refills: 0 | Status: COMPLETED | OUTPATIENT
Start: 2020-03-04 | End: 2020-03-04

## 2020-03-04 RX ORDER — POTASSIUM CHLORIDE 20 MEQ
10 PACKET (EA) ORAL
Refills: 0 | Status: COMPLETED | OUTPATIENT
Start: 2020-03-04 | End: 2020-03-04

## 2020-03-04 RX ADMIN — Medication 650 MILLIGRAM(S): at 14:00

## 2020-03-04 RX ADMIN — ATORVASTATIN CALCIUM 20 MILLIGRAM(S): 80 TABLET, FILM COATED ORAL at 22:02

## 2020-03-04 RX ADMIN — Medication 650 MILLIGRAM(S): at 22:11

## 2020-03-04 RX ADMIN — Medication 1.5 MILLIGRAM(S): at 13:27

## 2020-03-04 RX ADMIN — Medication 100 MILLIEQUIVALENT(S): at 12:28

## 2020-03-04 RX ADMIN — Medication 1 GRAM(S): at 17:39

## 2020-03-04 RX ADMIN — Medication 1.5 MILLIGRAM(S): at 17:36

## 2020-03-04 RX ADMIN — Medication 100 MILLIEQUIVALENT(S): at 12:36

## 2020-03-04 RX ADMIN — PANTOPRAZOLE SODIUM 40 MILLIGRAM(S): 20 TABLET, DELAYED RELEASE ORAL at 17:36

## 2020-03-04 RX ADMIN — Medication 2 MILLIGRAM(S): at 10:22

## 2020-03-04 RX ADMIN — Medication 650 MILLIGRAM(S): at 22:41

## 2020-03-04 RX ADMIN — DEXTROSE MONOHYDRATE, SODIUM CHLORIDE, AND POTASSIUM CHLORIDE 100 MILLILITER(S): 50; .745; 4.5 INJECTION, SOLUTION INTRAVENOUS at 09:07

## 2020-03-04 RX ADMIN — Medication 1.5 MILLIGRAM(S): at 22:02

## 2020-03-04 RX ADMIN — Medication 2 MILLIGRAM(S): at 09:01

## 2020-03-04 RX ADMIN — Medication 1 MILLIGRAM(S): at 12:36

## 2020-03-04 RX ADMIN — Medication 100 MILLIGRAM(S): at 12:36

## 2020-03-04 RX ADMIN — Medication 1 TABLET(S): at 12:36

## 2020-03-04 RX ADMIN — Medication 25 MILLIGRAM(S): at 05:37

## 2020-03-04 RX ADMIN — Medication 40 MILLIEQUIVALENT(S): at 10:22

## 2020-03-04 RX ADMIN — Medication 650 MILLIGRAM(S): at 13:29

## 2020-03-04 RX ADMIN — Medication 2 MILLIGRAM(S): at 04:36

## 2020-03-04 RX ADMIN — Medication 100 MILLIEQUIVALENT(S): at 10:22

## 2020-03-04 RX ADMIN — Medication 2 MILLIGRAM(S): at 00:34

## 2020-03-04 RX ADMIN — Medication 25 MILLIGRAM(S): at 17:36

## 2020-03-04 NOTE — PROGRESS NOTE ADULT - ASSESSMENT
54 male with alchohol abuse with elevated ciwa with vomiting and tremors b today with electrolyte imbalances and gastric pain.

## 2020-03-04 NOTE — PROGRESS NOTE ADULT - SUBJECTIVE AND OBJECTIVE BOX
HPI:  Pt is a 53 yo gentleman with a pmhx of etoh abuse who presents to the ED with vomiting and abdominal pain. Started earlier this morning. Has not had any alcohol since yesterday. Has had hx of alcohol withdrawal in the past. No chest pain, no sob, no fevers, no dysuria before that he binged on alchohol for four days - occasionally the vomitus is blood tinged (03 Mar 2020 14:36)      Patient is a 54y old  Male who presents with a chief complaint of vomiting  and abdominal pain (03 Mar 2020 22:48)      INTERVAL HPI/OVERNIGHT EVENTS: no acute events contineues to com,plain of abdominal pain CIWA     MEDICATIONS  (STANDING):  atorvastatin 20 milliGRAM(s) Oral at bedtime  dextrose 5% + sodium chloride 0.9% with potassium chloride 20 mEq/L 1000 milliLiter(s) (100 mL/Hr) IV Continuous <Continuous>  folic acid 1 milliGRAM(s) Oral daily  LORazepam   Injectable 1.5 milliGRAM(s) IV Push every 4 hours  LORazepam   Injectable   IV Push   metoprolol tartrate 25 milliGRAM(s) Oral two times a day  multivitamin 1 Tablet(s) Oral daily  pantoprazole  Injectable 40 milliGRAM(s) IV Push every 12 hours  thiamine 100 milliGRAM(s) Oral daily    MEDICATIONS  (PRN):  ondansetron Injectable 4 milliGRAM(s) IV Push every 6 hours PRN Nausea and/or Vomiting      Allergies    No Known Allergies    Intolerances        REVIEW OF SYSTEMS:  CONSTITUTIONAL: No fever, weight loss, or fatigue  EYES: No eye pain, visual disturbances, or discharge  ENMT:  No difficulty hearing, tinnitus, vertigo; No sinus or throat pain  NECK: No pain or stiffness  BREASTS: No pain, masses, or nipple discharge  RESPIRATORY: No cough, wheezing, chills or hemoptysis; No shortness of breath  CARDIOVASCULAR: No chest pain, palpitations, dizziness, or leg swelling  GASTROINTESTINAL: No abdominal or epigastric pain. No nausea, vomiting, or hematemesis; No diarrhea or constipation. No melena or hematochezia.  GENITOURINARY: No dysuria, frequency, hematuria, or incontinence  NEUROLOGICAL: No headaches, memory loss, loss of strength, numbness, or tremors  SKIN: No itching, burning, rashes, or lesions   LYMPH NODES: No enlarged glands  ENDOCRINE: No heat or cold intolerance; No hair loss  MUSCULOSKELETAL: No joint pain or swelling; No muscle, back, or extremity pain  PSYCHIATRIC: No depression, anxiety, mood swings, or difficulty sleeping  HEME/LYMPH: No easy bruising, or bleeding gums  ALLERGY AND IMMUNOLOGIC: No hives or eczema    Vital Signs Last 24 Hrs  T(C): 37.3 (04 Mar 2020 11:09), Max: 37.7 (03 Mar 2020 16:15)  T(F): 99.1 (04 Mar 2020 11:09), Max: 99.8 (03 Mar 2020 16:15)  HR: 83 (04 Mar 2020 11:09) (69 - 106)  BP: 113/66 (04 Mar 2020 11:09) (113/66 - 143/86)  BP(mean): --  RR: 17 (04 Mar 2020 11:09) (16 - 18)  SpO2: 97% (04 Mar 2020 11:09) (95% - 99%)    PHYSICAL EXAM:  GENERAL: NAD, well-groomed, well-developed  HEAD:  Atraumatic, Normocephalic  EYES: EOMI, PERRLA, conjunctiva and sclera clear  ENMT: No tonsillar erythema, exudates, or enlargement; Moist mucous membranes, Good dentition, No lesions  NECK: Supple, No JVD, Normal thyroid  NERVOUS SYSTEM:  Alert & Oriented X3, Good concentration; Motor Strength 5/5 B/L upper and lower extremities; DTRs 2+ intact and symmetric  CHEST/LUNG: Clear to percussion bilaterally; No rales, rhonchi, wheezing, or rubs  HEART: Regular rate and rhythm; No murmurs, rubs, or gallops  ABDOMEN: Soft,  tender , Nondistended; Bowel sounds present  EXTREMITIES:  2+ Peripheral Pulses, No clubbing, cyanosis, or edema  LYMPH: No lymphadenopathy noted  SKIN: No rashes or lesions    LABS:                        12.0   3.18  )-----------( 236      ( 04 Mar 2020 06:19 )             37.1     03-04    141  |  103  |  11  ----------------------------<  100<H>  2.8<LL>   |  30  |  0.94    Ca    8.2<L>      04 Mar 2020 06:19  Phos  2.5     03-04  Mg     1.8     03-04    TPro  7.9  /  Alb  3.6  /  TBili  0.5  /  DBili  .11  /  AST  45<H>  /  ALT  49  /  AlkPhos  47  03-03    PT/INR - ( 03 Mar 2020 11:21 )   PT: 11.7 sec;   INR: 1.04 ratio         PTT - ( 03 Mar 2020 11:21 )  PTT:26.8 sec    CAPILLARY BLOOD GLUCOSE          RADIOLOGY & ADDITIONAL TESTS:    Imaging Personally Reviewed:  [X ] YES  [ ] NO    Consultant(s) Notes Reviewed:  [X ] YES  [ ] NO    Care Discussed with Consultants/Other Providers [X ] YES  [ ] NO

## 2020-03-05 LAB
ALBUMIN SERPL ELPH-MCNC: 3.1 G/DL — LOW (ref 3.3–5)
ALP SERPL-CCNC: 40 U/L — SIGNIFICANT CHANGE UP (ref 40–120)
ALT FLD-CCNC: 36 U/L — SIGNIFICANT CHANGE UP (ref 12–78)
ANION GAP SERPL CALC-SCNC: 6 MMOL/L — SIGNIFICANT CHANGE UP (ref 5–17)
AST SERPL-CCNC: 32 U/L — SIGNIFICANT CHANGE UP (ref 15–37)
BILIRUB SERPL-MCNC: 0.8 MG/DL — SIGNIFICANT CHANGE UP (ref 0.2–1.2)
BUN SERPL-MCNC: 8 MG/DL — SIGNIFICANT CHANGE UP (ref 7–23)
CALCIUM SERPL-MCNC: 8.6 MG/DL — SIGNIFICANT CHANGE UP (ref 8.5–10.1)
CHLORIDE SERPL-SCNC: 105 MMOL/L — SIGNIFICANT CHANGE UP (ref 96–108)
CO2 SERPL-SCNC: 28 MMOL/L — SIGNIFICANT CHANGE UP (ref 22–31)
CREAT SERPL-MCNC: 0.88 MG/DL — SIGNIFICANT CHANGE UP (ref 0.5–1.3)
GLUCOSE SERPL-MCNC: 86 MG/DL — SIGNIFICANT CHANGE UP (ref 70–99)
HCT VFR BLD CALC: 39.5 % — SIGNIFICANT CHANGE UP (ref 39–50)
HGB BLD-MCNC: 12.5 G/DL — LOW (ref 13–17)
MAGNESIUM SERPL-MCNC: 1.7 MG/DL — SIGNIFICANT CHANGE UP (ref 1.6–2.6)
MCHC RBC-ENTMCNC: 27.7 PG — SIGNIFICANT CHANGE UP (ref 27–34)
MCHC RBC-ENTMCNC: 31.6 GM/DL — LOW (ref 32–36)
MCV RBC AUTO: 87.4 FL — SIGNIFICANT CHANGE UP (ref 80–100)
NRBC # BLD: 0 /100 WBCS — SIGNIFICANT CHANGE UP (ref 0–0)
PHOSPHATE SERPL-MCNC: 2.6 MG/DL — SIGNIFICANT CHANGE UP (ref 2.5–4.5)
PLATELET # BLD AUTO: 136 K/UL — LOW (ref 150–400)
POTASSIUM SERPL-MCNC: 3.3 MMOL/L — LOW (ref 3.5–5.3)
POTASSIUM SERPL-SCNC: 3.3 MMOL/L — LOW (ref 3.5–5.3)
PROT SERPL-MCNC: 6.8 GM/DL — SIGNIFICANT CHANGE UP (ref 6–8.3)
RBC # BLD: 4.52 M/UL — SIGNIFICANT CHANGE UP (ref 4.2–5.8)
RBC # FLD: 13.9 % — SIGNIFICANT CHANGE UP (ref 10.3–14.5)
SODIUM SERPL-SCNC: 139 MMOL/L — SIGNIFICANT CHANGE UP (ref 135–145)
WBC # BLD: 3.7 K/UL — LOW (ref 3.8–10.5)
WBC # FLD AUTO: 3.7 K/UL — LOW (ref 3.8–10.5)

## 2020-03-05 PROCEDURE — 99232 SBSQ HOSP IP/OBS MODERATE 35: CPT

## 2020-03-05 PROCEDURE — 36573 INSJ PICC RS&I 5 YR+: CPT

## 2020-03-05 RX ORDER — MAGNESIUM SULFATE 500 MG/ML
1 VIAL (ML) INJECTION ONCE
Refills: 0 | Status: COMPLETED | OUTPATIENT
Start: 2020-03-05 | End: 2020-03-05

## 2020-03-05 RX ORDER — POTASSIUM CHLORIDE 20 MEQ
40 PACKET (EA) ORAL ONCE
Refills: 0 | Status: COMPLETED | OUTPATIENT
Start: 2020-03-05 | End: 2020-03-05

## 2020-03-05 RX ADMIN — Medication 1 MILLIGRAM(S): at 11:11

## 2020-03-05 RX ADMIN — ATORVASTATIN CALCIUM 20 MILLIGRAM(S): 80 TABLET, FILM COATED ORAL at 22:04

## 2020-03-05 RX ADMIN — Medication 1.5 MILLIGRAM(S): at 06:01

## 2020-03-05 RX ADMIN — Medication 1 MILLIGRAM(S): at 18:02

## 2020-03-05 RX ADMIN — Medication 40 MILLIEQUIVALENT(S): at 15:44

## 2020-03-05 RX ADMIN — Medication 650 MILLIGRAM(S): at 11:11

## 2020-03-05 RX ADMIN — Medication 1.5 MILLIGRAM(S): at 10:04

## 2020-03-05 RX ADMIN — Medication 1 TABLET(S): at 11:11

## 2020-03-05 RX ADMIN — Medication 1.5 MILLIGRAM(S): at 02:20

## 2020-03-05 RX ADMIN — Medication 100 GRAM(S): at 15:44

## 2020-03-05 RX ADMIN — Medication 1 GRAM(S): at 00:00

## 2020-03-05 RX ADMIN — Medication 1 GRAM(S): at 06:01

## 2020-03-05 RX ADMIN — PANTOPRAZOLE SODIUM 40 MILLIGRAM(S): 20 TABLET, DELAYED RELEASE ORAL at 18:03

## 2020-03-05 RX ADMIN — Medication 1 MILLIGRAM(S): at 14:37

## 2020-03-05 RX ADMIN — Medication 25 MILLIGRAM(S): at 06:01

## 2020-03-05 RX ADMIN — Medication 25 MILLIGRAM(S): at 18:03

## 2020-03-05 RX ADMIN — Medication 1 GRAM(S): at 18:03

## 2020-03-05 RX ADMIN — Medication 1 GRAM(S): at 11:11

## 2020-03-05 RX ADMIN — Medication 650 MILLIGRAM(S): at 12:27

## 2020-03-05 RX ADMIN — DEXTROSE MONOHYDRATE, SODIUM CHLORIDE, AND POTASSIUM CHLORIDE 100 MILLILITER(S): 50; .745; 4.5 INJECTION, SOLUTION INTRAVENOUS at 00:02

## 2020-03-05 RX ADMIN — DEXTROSE MONOHYDRATE, SODIUM CHLORIDE, AND POTASSIUM CHLORIDE 100 MILLILITER(S): 50; .745; 4.5 INJECTION, SOLUTION INTRAVENOUS at 15:45

## 2020-03-05 RX ADMIN — Medication 100 MILLIGRAM(S): at 11:11

## 2020-03-05 RX ADMIN — Medication 1 MILLIGRAM(S): at 22:04

## 2020-03-05 NOTE — PROGRESS NOTE ADULT - SUBJECTIVE AND OBJECTIVE BOX
Pt stable - feels a little better  +alcohol detox      MEDICATIONS  (STANDING):  atorvastatin 20 milliGRAM(s) Oral at bedtime  dextrose 5% + sodium chloride 0.9% with potassium chloride 20 mEq/L 1000 milliLiter(s) (100 mL/Hr) IV Continuous <Continuous>  folic acid 1 milliGRAM(s) Oral daily  LORazepam   Injectable 1 milliGRAM(s) IV Push every 4 hours  LORazepam   Injectable   IV Push   magnesium sulfate  IVPB 1 Gram(s) IV Intermittent once  metoprolol tartrate 25 milliGRAM(s) Oral two times a day  multivitamin 1 Tablet(s) Oral daily  pantoprazole  Injectable 40 milliGRAM(s) IV Push every 12 hours  potassium chloride    Tablet ER 40 milliEquivalent(s) Oral once  sucralfate suspension 1 Gram(s) Oral four times a day  thiamine 100 milliGRAM(s) Oral daily    MEDICATIONS  (PRN):  acetaminophen   Tablet .. 650 milliGRAM(s) Oral every 6 hours PRN Mild Pain (1 - 3)  ondansetron Injectable 4 milliGRAM(s) IV Push every 6 hours PRN Nausea and/or Vomiting      Allergies    No Known Allergies    Intolerances        Vital Signs Last 24 Hrs  T(C): 36.9 (05 Mar 2020 11:06), Max: 37.3 (04 Mar 2020 23:41)  T(F): 98.5 (05 Mar 2020 11:06), Max: 99.2 (04 Mar 2020 23:41)  HR: 74 (05 Mar 2020 11:06) (61 - 74)  BP: 118/79 (05 Mar 2020 11:06) (107/74 - 131/91)  BP(mean): --  RR: 16 (05 Mar 2020 11:06) (16 - 18)  SpO2: 100% (05 Mar 2020 11:06) (98% - 100%)    PHYSICAL EXAM:  General: NAD.  CVS: S1, S2  Chest: air entry bilaterally present  Abd: BS present, soft, non-tender      LABS:                        12.5   3.70  )-----------( 136      ( 05 Mar 2020 06:25 )             39.5     03-05    139  |  105  |  8   ----------------------------<  86  3.3<L>   |  28  |  0.88    Ca    8.6      05 Mar 2020 06:25  Phos  2.6     03-05  Mg     1.7     03-05    TPro  6.8  /  Alb  3.1<L>  /  TBili  0.8  /  DBili  x   /  AST  32  /  ALT  36  /  AlkPhos  40  03-05      advance diet as tolerated  same meds Pt stable - still with abd discomfort  +alcohol detox      MEDICATIONS  (STANDING):  atorvastatin 20 milliGRAM(s) Oral at bedtime  dextrose 5% + sodium chloride 0.9% with potassium chloride 20 mEq/L 1000 milliLiter(s) (100 mL/Hr) IV Continuous <Continuous>  folic acid 1 milliGRAM(s) Oral daily  LORazepam   Injectable 1 milliGRAM(s) IV Push every 4 hours  LORazepam   Injectable   IV Push   magnesium sulfate  IVPB 1 Gram(s) IV Intermittent once  metoprolol tartrate 25 milliGRAM(s) Oral two times a day  multivitamin 1 Tablet(s) Oral daily  pantoprazole  Injectable 40 milliGRAM(s) IV Push every 12 hours  potassium chloride    Tablet ER 40 milliEquivalent(s) Oral once  sucralfate suspension 1 Gram(s) Oral four times a day  thiamine 100 milliGRAM(s) Oral daily    MEDICATIONS  (PRN):  acetaminophen   Tablet .. 650 milliGRAM(s) Oral every 6 hours PRN Mild Pain (1 - 3)  ondansetron Injectable 4 milliGRAM(s) IV Push every 6 hours PRN Nausea and/or Vomiting      Allergies    No Known Allergies    Intolerances        Vital Signs Last 24 Hrs  T(C): 36.9 (05 Mar 2020 11:06), Max: 37.3 (04 Mar 2020 23:41)  T(F): 98.5 (05 Mar 2020 11:06), Max: 99.2 (04 Mar 2020 23:41)  HR: 74 (05 Mar 2020 11:06) (61 - 74)  BP: 118/79 (05 Mar 2020 11:06) (107/74 - 131/91)  BP(mean): --  RR: 16 (05 Mar 2020 11:06) (16 - 18)  SpO2: 100% (05 Mar 2020 11:06) (98% - 100%)    PHYSICAL EXAM:  General: NAD.  CVS: S1, S2  Chest: air entry bilaterally present  Abd: BS present, soft, non-tender      LABS:                        12.5   3.70  )-----------( 136      ( 05 Mar 2020 06:25 )             39.5     03-05    139  |  105  |  8   ----------------------------<  86  3.3<L>   |  28  |  0.88    Ca    8.6      05 Mar 2020 06:25  Phos  2.6     03-05  Mg     1.7     03-05    TPro  6.8  /  Alb  3.1<L>  /  TBili  0.8  /  DBili  x   /  AST  32  /  ALT  36  /  AlkPhos  40  03-05      continue clear liquids  same meds  repeat labs  alcohol detox

## 2020-03-05 NOTE — PROGRESS NOTE ADULT - SUBJECTIVE AND OBJECTIVE BOX
HPI:  Pt is a 55 yo gentleman with a pmhx of etoh abuse who presents to the ED with vomiting and abdominal pain. Started earlier this morning. Has not had any alcohol since yesterday. Has had hx of alcohol withdrawal in the past. No chest pain, no sob, no fevers, no dysuria before that he binged on alchohol for four days - occasionally the vomitus is blood tinged (03 Mar 2020 14:36)    Patient is a 54y old  Male who presents with a chief complaint of vomiting  and abdominal pain (05 Mar 2020 15:11)      INTERVAL HPI/OVERNIGHT EVENTS: no acute events overnight     MEDICATIONS  (STANDING):  atorvastatin 20 milliGRAM(s) Oral at bedtime  dextrose 5% + sodium chloride 0.9% with potassium chloride 20 mEq/L 1000 milliLiter(s) (100 mL/Hr) IV Continuous <Continuous>  folic acid 1 milliGRAM(s) Oral daily  LORazepam   Injectable 1 milliGRAM(s) IV Push every 4 hours  LORazepam   Injectable   IV Push   metoprolol tartrate 25 milliGRAM(s) Oral two times a day  multivitamin 1 Tablet(s) Oral daily  pantoprazole  Injectable 40 milliGRAM(s) IV Push every 12 hours  sucralfate suspension 1 Gram(s) Oral four times a day  thiamine 100 milliGRAM(s) Oral daily    MEDICATIONS  (PRN):  acetaminophen   Tablet .. 650 milliGRAM(s) Oral every 6 hours PRN Mild Pain (1 - 3)  ondansetron Injectable 4 milliGRAM(s) IV Push every 6 hours PRN Nausea and/or Vomiting      Allergies    No Known Allergies    Intolerances        REVIEW OF SYSTEMS:  CONSTITUTIONAL: No fever, weight loss, or fatigue  EYES: No eye pain, visual disturbances, or discharge  ENMT:  No difficulty hearing, tinnitus, vertigo; No sinus or throat pain  NECK: No pain or stiffness  BREASTS: No pain, masses, or nipple discharge  RESPIRATORY: No cough, wheezing, chills or hemoptysis; No shortness of breath  CARDIOVASCULAR: No chest pain, palpitations, dizziness, or leg swelling  GASTROINTESTINAL: No abdominal or epigastric pain. No nausea, vomiting, or hematemesis; No diarrhea or constipation. No melena or hematochezia.  GENITOURINARY: No dysuria, frequency, hematuria, or incontinence  NEUROLOGICAL: No headaches, memory loss, loss of strength, numbness, or tremors  SKIN: No itching, burning, rashes, or lesions   LYMPH NODES: No enlarged glands  ENDOCRINE: No heat or cold intolerance; No hair loss  MUSCULOSKELETAL: No joint pain or swelling; No muscle, back, or extremity pain  PSYCHIATRIC: No depression, anxiety, mood swings, or difficulty sleeping  HEME/LYMPH: No easy bruising, or bleeding gums  ALLERGY AND IMMUNOLOGIC: No hives or eczema    Vital Signs Last 24 Hrs  T(C): 37.1 (05 Mar 2020 17:31), Max: 37.3 (04 Mar 2020 23:41)  T(F): 98.8 (05 Mar 2020 17:31), Max: 99.2 (04 Mar 2020 23:41)  HR: 59 (05 Mar 2020 17:31) (59 - 74)  BP: 120/78 (05 Mar 2020 17:31) (107/74 - 120/78)  BP(mean): --  RR: 18 (05 Mar 2020 17:31) (16 - 18)  SpO2: 99% (05 Mar 2020 17:31) (98% - 100%)    PHYSICAL EXAM:  GENERAL: NAD, well-groomed, well-developed  HEAD:  Atraumatic, Normocephalic  EYES: EOMI, PERRLA, conjunctiva and sclera clear  ENMT: No tonsillar erythema, exudates, or enlargement; Moist mucous membranes, Good dentition, No lesions  NECK: Supple, No JVD, Normal thyroid  NERVOUS SYSTEM:  Alert & Oriented X3, Good concentration; Motor Strength 5/5 B/L upper and lower extremities; DTRs 2+ intact and symmetric  CHEST/LUNG: Clear to percussion bilaterally; No rales, rhonchi, wheezing, or rubs  HEART: Regular rate and rhythm; No murmurs, rubs, or gallops  ABDOMEN: Soft, Nontender, Nondistended; Bowel sounds present  EXTREMITIES:  2+ Peripheral Pulses, No clubbing, cyanosis, or edema  LYMPH: No lymphadenopathy noted  SKIN: No rashes or lesions    LABS:                        12.5   3.70  )-----------( 136      ( 05 Mar 2020 06:25 )             39.5     03-05    139  |  105  |  8   ----------------------------<  86  3.3<L>   |  28  |  0.88    Ca    8.6      05 Mar 2020 06:25  Phos  2.6     03-05  Mg     1.7     03-05    TPro  6.8  /  Alb  3.1<L>  /  TBili  0.8  /  DBili  x   /  AST  32  /  ALT  36  /  AlkPhos  40  03-05        CAPILLARY BLOOD GLUCOSE          RADIOLOGY & ADDITIONAL TESTS:    Imaging Personally Reviewed:  [X ] YES  [ ] NO    Consultant(s) Notes Reviewed:  [ X] YES  [ ] NO    Care Discussed with Consultants/Other Providers [X ] YES  [ ] NO

## 2020-03-05 NOTE — PROCEDURE NOTE - ADDITIONAL PROCEDURE DETAILS
pt s/p midline placement inserted into the left basilic vein under US guidance.  4fr x 20cm SL. Pt tolerated procedure well  -midline can be accessed

## 2020-03-06 DIAGNOSIS — E83.39 OTHER DISORDERS OF PHOSPHORUS METABOLISM: ICD-10-CM

## 2020-03-06 LAB
ANION GAP SERPL CALC-SCNC: 6 MMOL/L — SIGNIFICANT CHANGE UP (ref 5–17)
BUN SERPL-MCNC: 8 MG/DL — SIGNIFICANT CHANGE UP (ref 7–23)
CALCIUM SERPL-MCNC: 8.4 MG/DL — LOW (ref 8.5–10.1)
CHLORIDE SERPL-SCNC: 113 MMOL/L — HIGH (ref 96–108)
CO2 SERPL-SCNC: 25 MMOL/L — SIGNIFICANT CHANGE UP (ref 22–31)
CREAT SERPL-MCNC: 0.87 MG/DL — SIGNIFICANT CHANGE UP (ref 0.5–1.3)
GLUCOSE SERPL-MCNC: 105 MG/DL — HIGH (ref 70–99)
HCT VFR BLD CALC: 38.8 % — LOW (ref 39–50)
HGB BLD-MCNC: 12.1 G/DL — LOW (ref 13–17)
MAGNESIUM SERPL-MCNC: 1.9 MG/DL — SIGNIFICANT CHANGE UP (ref 1.6–2.6)
MCHC RBC-ENTMCNC: 27.4 PG — SIGNIFICANT CHANGE UP (ref 27–34)
MCHC RBC-ENTMCNC: 31.2 GM/DL — LOW (ref 32–36)
MCV RBC AUTO: 87.8 FL — SIGNIFICANT CHANGE UP (ref 80–100)
NRBC # BLD: 0 /100 WBCS — SIGNIFICANT CHANGE UP (ref 0–0)
PHOSPHATE SERPL-MCNC: 2.4 MG/DL — LOW (ref 2.5–4.5)
PLATELET # BLD AUTO: 226 K/UL — SIGNIFICANT CHANGE UP (ref 150–400)
POTASSIUM SERPL-MCNC: 3.8 MMOL/L — SIGNIFICANT CHANGE UP (ref 3.5–5.3)
POTASSIUM SERPL-SCNC: 3.8 MMOL/L — SIGNIFICANT CHANGE UP (ref 3.5–5.3)
RBC # BLD: 4.42 M/UL — SIGNIFICANT CHANGE UP (ref 4.2–5.8)
RBC # FLD: 13.7 % — SIGNIFICANT CHANGE UP (ref 10.3–14.5)
SODIUM SERPL-SCNC: 144 MMOL/L — SIGNIFICANT CHANGE UP (ref 135–145)
WBC # BLD: 3.41 K/UL — LOW (ref 3.8–10.5)
WBC # FLD AUTO: 3.41 K/UL — LOW (ref 3.8–10.5)

## 2020-03-06 PROCEDURE — 99232 SBSQ HOSP IP/OBS MODERATE 35: CPT

## 2020-03-06 RX ORDER — POTASSIUM PHOSPHATE, MONOBASIC POTASSIUM PHOSPHATE, DIBASIC 236; 224 MG/ML; MG/ML
15 INJECTION, SOLUTION INTRAVENOUS ONCE
Refills: 0 | Status: COMPLETED | OUTPATIENT
Start: 2020-03-06 | End: 2020-03-06

## 2020-03-06 RX ADMIN — POTASSIUM PHOSPHATE, MONOBASIC POTASSIUM PHOSPHATE, DIBASIC 62.5 MILLIMOLE(S): 236; 224 INJECTION, SOLUTION INTRAVENOUS at 18:47

## 2020-03-06 RX ADMIN — Medication 1 GRAM(S): at 11:42

## 2020-03-06 RX ADMIN — Medication 25 MILLIGRAM(S): at 18:08

## 2020-03-06 RX ADMIN — PANTOPRAZOLE SODIUM 40 MILLIGRAM(S): 20 TABLET, DELAYED RELEASE ORAL at 18:07

## 2020-03-06 RX ADMIN — Medication 1 GRAM(S): at 18:07

## 2020-03-06 RX ADMIN — Medication 1 MILLIGRAM(S): at 11:42

## 2020-03-06 RX ADMIN — Medication 1 MILLIGRAM(S): at 11:38

## 2020-03-06 RX ADMIN — DEXTROSE MONOHYDRATE, SODIUM CHLORIDE, AND POTASSIUM CHLORIDE 100 MILLILITER(S): 50; .745; 4.5 INJECTION, SOLUTION INTRAVENOUS at 02:19

## 2020-03-06 RX ADMIN — Medication 100 MILLIGRAM(S): at 11:42

## 2020-03-06 RX ADMIN — Medication 0.5 MILLIGRAM(S): at 22:43

## 2020-03-06 RX ADMIN — Medication 0.5 MILLIGRAM(S): at 18:06

## 2020-03-06 RX ADMIN — PANTOPRAZOLE SODIUM 40 MILLIGRAM(S): 20 TABLET, DELAYED RELEASE ORAL at 05:44

## 2020-03-06 RX ADMIN — Medication 0.5 MILLIGRAM(S): at 14:23

## 2020-03-06 RX ADMIN — ATORVASTATIN CALCIUM 20 MILLIGRAM(S): 80 TABLET, FILM COATED ORAL at 22:42

## 2020-03-06 RX ADMIN — Medication 1 GRAM(S): at 00:02

## 2020-03-06 RX ADMIN — Medication 1 MILLIGRAM(S): at 05:44

## 2020-03-06 RX ADMIN — DEXTROSE MONOHYDRATE, SODIUM CHLORIDE, AND POTASSIUM CHLORIDE 100 MILLILITER(S): 50; .745; 4.5 INJECTION, SOLUTION INTRAVENOUS at 22:43

## 2020-03-06 RX ADMIN — Medication 1 GRAM(S): at 05:43

## 2020-03-06 RX ADMIN — DEXTROSE MONOHYDRATE, SODIUM CHLORIDE, AND POTASSIUM CHLORIDE 100 MILLILITER(S): 50; .745; 4.5 INJECTION, SOLUTION INTRAVENOUS at 11:39

## 2020-03-06 RX ADMIN — Medication 1 MILLIGRAM(S): at 02:17

## 2020-03-06 RX ADMIN — Medication 1 TABLET(S): at 11:42

## 2020-03-06 NOTE — PROGRESS NOTE ADULT - SUBJECTIVE AND OBJECTIVE BOX
HPI:  Pt is a 53 yo gentleman with a pmhx of etoh abuse who presents to the ED with vomiting and abdominal pain. Started earlier this morning. Has not had any alcohol since yesterday. Has had hx of alcohol withdrawal in the past. No chest pain, no sob, no fevers, no dysuria before that he binged on alchohol for four days - occasionally the vomitus is blood tinged (03 Mar 2020 14:36)    Patient is a 54y old  Male who presents with a chief complaint of vomiting  and abdominal pain (06 Mar 2020 07:32)      INTERVAL HPI/OVERNIGHT EVENTS: no acute events sleeping     MEDICATIONS  (STANDING):  atorvastatin 20 milliGRAM(s) Oral at bedtime  dextrose 5% + sodium chloride 0.9% with potassium chloride 20 mEq/L 1000 milliLiter(s) (100 mL/Hr) IV Continuous <Continuous>  folic acid 1 milliGRAM(s) Oral daily  LORazepam   Injectable 0.5 milliGRAM(s) IV Push every 4 hours  LORazepam   Injectable   IV Push   metoprolol tartrate 25 milliGRAM(s) Oral two times a day  multivitamin 1 Tablet(s) Oral daily  pantoprazole  Injectable 40 milliGRAM(s) IV Push every 12 hours  potassium phosphate IVPB 15 milliMole(s) IV Intermittent once  sucralfate suspension 1 Gram(s) Oral four times a day  thiamine 100 milliGRAM(s) Oral daily    MEDICATIONS  (PRN):  acetaminophen   Tablet .. 650 milliGRAM(s) Oral every 6 hours PRN Mild Pain (1 - 3)  ondansetron Injectable 4 milliGRAM(s) IV Push every 6 hours PRN Nausea and/or Vomiting      Allergies    No Known Allergies    Intolerances        REVIEW OF SYSTEMS:  CONSTITUTIONAL: No fever, weight loss, or fatigue  EYES: No eye pain, visual disturbances, or discharge  ENMT:  No difficulty hearing, tinnitus, vertigo; No sinus or throat pain  NECK: No pain or stiffness  BREASTS: No pain, masses, or nipple discharge  RESPIRATORY: No cough, wheezing, chills or hemoptysis; No shortness of breath  CARDIOVASCULAR: No chest pain, palpitations, dizziness, or leg swelling  GASTROINTESTINAL: No abdominal or epigastric pain. No nausea, vomiting, or hematemesis; No diarrhea or constipation. No melena or hematochezia.  GENITOURINARY: No dysuria, frequency, hematuria, or incontinence  NEUROLOGICAL: No headaches, memory loss, loss of strength, numbness, or tremors  SKIN: No itching, burning, rashes, or lesions   LYMPH NODES: No enlarged glands  ENDOCRINE: No heat or cold intolerance; No hair loss  MUSCULOSKELETAL: No joint pain or swelling; No muscle, back, or extremity pain  PSYCHIATRIC: No depression, anxiety, mood swings, or difficulty sleeping  HEME/LYMPH: No easy bruising, or bleeding gums  ALLERGY AND IMMUNOLOGIC: No hives or eczema    Vital Signs Last 24 Hrs  T(C): 36.7 (06 Mar 2020 11:33), Max: 37.1 (05 Mar 2020 17:31)  T(F): 98.1 (06 Mar 2020 11:33), Max: 98.8 (05 Mar 2020 17:31)  HR: 66 (06 Mar 2020 11:33) (59 - 67)  BP: 136/98 (06 Mar 2020 11:33) (103/59 - 136/98)  BP(mean): --  RR: 16 (06 Mar 2020 11:33) (16 - 18)  SpO2: 99% (06 Mar 2020 11:33) (95% - 99%)    PHYSICAL EXAM:  GENERAL: NAD, well-groomed, well-developed  HEAD:  Atraumatic, Normocephalic  EYES: EOMI, PERRLA, conjunctiva and sclera clear  ENMT: No tonsillar erythema, exudates, or enlargement; Moist mucous membranes, Good dentition, No lesions  NECK: Supple, No JVD, Normal thyroid  NERVOUS SYSTEM:  Alert & Oriented X3, Good concentration; Motor Strength 5/5 B/L upper and lower extremities; DTRs 2+ intact and symmetric  CHEST/LUNG: Clear to percussion bilaterally; No rales, rhonchi, wheezing, or rubs  HEART: Regular rate and rhythm; No murmurs, rubs, or gallops  ABDOMEN: Soft, mild tenderness, Nondistended; Bowel sounds present  EXTREMITIES:  2+ Peripheral Pulses, No clubbing, cyanosis, or edema  LYMPH: No lymphadenopathy noted  SKIN: No rashes or lesions    LABS:                        12.1   3.41  )-----------( 226      ( 06 Mar 2020 09:39 )             38.8     03-06    144  |  113<H>  |  8   ----------------------------<  105<H>  3.8   |  25  |  0.87    Ca    8.4<L>      06 Mar 2020 09:39  Phos  2.4     03-06  Mg     1.9     03-06    TPro  6.8  /  Alb  3.1<L>  /  TBili  0.8  /  DBili  x   /  AST  32  /  ALT  36  /  AlkPhos  40  03-05        CAPILLARY BLOOD GLUCOSE          RADIOLOGY & ADDITIONAL TESTS:    Imaging Personally Reviewed:  [X ] YES  [ ] NO    Consultant(s) Notes Reviewed:  [X ] YES  [ ] NO    Care Discussed with Consultants/Other Providers [ X] YES  [ ] NO

## 2020-03-06 NOTE — PROGRESS NOTE ADULT - SUBJECTIVE AND OBJECTIVE BOX
Pt feeling a little better today  still with some mild mid epigastric discomfort      MEDICATIONS  (STANDING):  atorvastatin 20 milliGRAM(s) Oral at bedtime  dextrose 5% + sodium chloride 0.9% with potassium chloride 20 mEq/L 1000 milliLiter(s) (100 mL/Hr) IV Continuous <Continuous>  folic acid 1 milliGRAM(s) Oral daily  LORazepam   Injectable 1 milliGRAM(s) IV Push every 4 hours  LORazepam   Injectable 0.5 milliGRAM(s) IV Push every 4 hours  LORazepam   Injectable   IV Push   metoprolol tartrate 25 milliGRAM(s) Oral two times a day  multivitamin 1 Tablet(s) Oral daily  pantoprazole  Injectable 40 milliGRAM(s) IV Push every 12 hours  sucralfate suspension 1 Gram(s) Oral four times a day  thiamine 100 milliGRAM(s) Oral daily    MEDICATIONS  (PRN):  acetaminophen   Tablet .. 650 milliGRAM(s) Oral every 6 hours PRN Mild Pain (1 - 3)  ondansetron Injectable 4 milliGRAM(s) IV Push every 6 hours PRN Nausea and/or Vomiting      Allergies    No Known Allergies    Intolerances        Vital Signs Last 24 Hrs  T(C): 36.6 (06 Mar 2020 05:53), Max: 37.1 (05 Mar 2020 17:31)  T(F): 97.9 (06 Mar 2020 05:53), Max: 98.8 (05 Mar 2020 17:31)  HR: 62 (06 Mar 2020 05:53) (59 - 74)  BP: 103/59 (06 Mar 2020 05:53) (103/59 - 120/78)  BP(mean): --  RR: 16 (06 Mar 2020 05:53) (16 - 18)  SpO2: 96% (06 Mar 2020 05:53) (95% - 100%)    PHYSICAL EXAM:  General: NAD.  CVS: S1, S2  Chest: air entry bilaterally present  Abd: BS present, soft, non-tender      LABS:                        12.5   3.70  )-----------( 136      ( 05 Mar 2020 06:25 )             39.5     03-05    139  |  105  |  8   ----------------------------<  86  3.3<L>   |  28  |  0.88    Ca    8.6      05 Mar 2020 06:25  Phos  2.6     03-05  Mg     1.7     03-05    TPro  6.8  /  Alb  3.1<L>  /  TBili  0.8  /  DBili  x   /  AST  32  /  ALT  36  /  AlkPhos  40  03-05        advance diet to full fluids  continue PO carafate and IV protonix

## 2020-03-06 NOTE — PROGRESS NOTE ADULT - PROBLEM/PLAN-5
Type and Screen/POCT Blood Glucose/PT/PTT
DISPLAY PLAN FREE TEXT

## 2020-03-07 ENCOUNTER — TRANSCRIPTION ENCOUNTER (OUTPATIENT)
Age: 55
End: 2020-03-07

## 2020-03-07 VITALS
DIASTOLIC BLOOD PRESSURE: 83 MMHG | RESPIRATION RATE: 17 BRPM | OXYGEN SATURATION: 98 % | TEMPERATURE: 98 F | HEART RATE: 56 BPM | SYSTOLIC BLOOD PRESSURE: 127 MMHG

## 2020-03-07 LAB
ANION GAP SERPL CALC-SCNC: 6 MMOL/L — SIGNIFICANT CHANGE UP (ref 5–17)
BUN SERPL-MCNC: 5 MG/DL — LOW (ref 7–23)
CALCIUM SERPL-MCNC: 8.8 MG/DL — SIGNIFICANT CHANGE UP (ref 8.5–10.1)
CHLORIDE SERPL-SCNC: 110 MMOL/L — HIGH (ref 96–108)
CO2 SERPL-SCNC: 26 MMOL/L — SIGNIFICANT CHANGE UP (ref 22–31)
CREAT SERPL-MCNC: 0.85 MG/DL — SIGNIFICANT CHANGE UP (ref 0.5–1.3)
GLUCOSE SERPL-MCNC: 97 MG/DL — SIGNIFICANT CHANGE UP (ref 70–99)
HCT VFR BLD CALC: 39.6 % — SIGNIFICANT CHANGE UP (ref 39–50)
HGB BLD-MCNC: 12.6 G/DL — LOW (ref 13–17)
MAGNESIUM SERPL-MCNC: 1.9 MG/DL — SIGNIFICANT CHANGE UP (ref 1.6–2.6)
MCHC RBC-ENTMCNC: 27.6 PG — SIGNIFICANT CHANGE UP (ref 27–34)
MCHC RBC-ENTMCNC: 31.8 GM/DL — LOW (ref 32–36)
MCV RBC AUTO: 86.8 FL — SIGNIFICANT CHANGE UP (ref 80–100)
NRBC # BLD: 0 /100 WBCS — SIGNIFICANT CHANGE UP (ref 0–0)
PHOSPHATE SERPL-MCNC: 3.2 MG/DL — SIGNIFICANT CHANGE UP (ref 2.5–4.5)
PLATELET # BLD AUTO: 230 K/UL — SIGNIFICANT CHANGE UP (ref 150–400)
POTASSIUM SERPL-MCNC: 3.7 MMOL/L — SIGNIFICANT CHANGE UP (ref 3.5–5.3)
POTASSIUM SERPL-SCNC: 3.7 MMOL/L — SIGNIFICANT CHANGE UP (ref 3.5–5.3)
RBC # BLD: 4.56 M/UL — SIGNIFICANT CHANGE UP (ref 4.2–5.8)
RBC # FLD: 13.7 % — SIGNIFICANT CHANGE UP (ref 10.3–14.5)
SODIUM SERPL-SCNC: 142 MMOL/L — SIGNIFICANT CHANGE UP (ref 135–145)
WBC # BLD: 3.44 K/UL — LOW (ref 3.8–10.5)
WBC # FLD AUTO: 3.44 K/UL — LOW (ref 3.8–10.5)

## 2020-03-07 PROCEDURE — 99239 HOSP IP/OBS DSCHRG MGMT >30: CPT

## 2020-03-07 RX ORDER — PANTOPRAZOLE SODIUM 20 MG/1
1 TABLET, DELAYED RELEASE ORAL
Qty: 30 | Refills: 0
Start: 2020-03-07 | End: 2020-04-05

## 2020-03-07 RX ORDER — SUCRALFATE 1 G
10 TABLET ORAL
Qty: 1200 | Refills: 0
Start: 2020-03-07 | End: 2020-04-05

## 2020-03-07 RX ADMIN — Medication 0.5 MILLIGRAM(S): at 03:10

## 2020-03-07 RX ADMIN — Medication 0.5 MILLIGRAM(S): at 10:07

## 2020-03-07 RX ADMIN — Medication 1 GRAM(S): at 00:29

## 2020-03-07 RX ADMIN — Medication 1 MILLIGRAM(S): at 11:02

## 2020-03-07 RX ADMIN — Medication 0.5 MILLIGRAM(S): at 05:20

## 2020-03-07 RX ADMIN — Medication 1 GRAM(S): at 11:02

## 2020-03-07 RX ADMIN — Medication 1 TABLET(S): at 11:02

## 2020-03-07 RX ADMIN — Medication 100 MILLIGRAM(S): at 11:02

## 2020-03-07 RX ADMIN — PANTOPRAZOLE SODIUM 40 MILLIGRAM(S): 20 TABLET, DELAYED RELEASE ORAL at 05:20

## 2020-03-07 RX ADMIN — Medication 1 GRAM(S): at 05:20

## 2020-03-07 NOTE — DISCHARGE NOTE PROVIDER - NSDCCPCAREPLAN_GEN_ALL_CORE_FT
PRINCIPAL DISCHARGE DIAGNOSIS  Diagnosis: Alcohol withdrawal syndrome without complication  Assessment and Plan of Treatment: resolved please rerain from drinking      SECONDARY DISCHARGE DIAGNOSES  Diagnosis: Acute alcoholic gastritis with hemorrhage  Assessment and Plan of Treatment: resolved please follow up with GI

## 2020-03-07 NOTE — PROGRESS NOTE ADULT - REASON FOR ADMISSION
vomiting  and abdominal pain

## 2020-03-07 NOTE — DISCHARGE NOTE PROVIDER - HOSPITAL COURSE
HPI:    Pt is a 55 yo gentleman with a pmhx of etoh abuse who presents to the ED with vomiting and abdominal pain. Started earlier this morning. Has not had any alcohol since yesterday. Has had hx of alcohol withdrawal in the past. No chest pain, no sob, no fevers, no dysuria before that he binged on alchohol for four days - occasionally the vomitus is blood tinged (03 Mar 2020 14:36)            patient observed in hospital CIWA score remained between 2 and 3         Patient was able to tolerate diet with nauseua  and vomiting         Patient to follow up with GI and PMD         Patient told to refrain from drinking

## 2020-03-07 NOTE — DISCHARGE NOTE PROVIDER - NSDCMRMEDTOKEN_GEN_ALL_CORE_FT
atorvastatin 20 mg oral tablet: 1 tab(s) orally once a day (at bedtime)  folic acid 1 mg oral tablet: 1 tab(s) orally once a day  metoprolol tartrate 25 mg oral tablet: 1 tab(s) orally every 12 hours  Multiple Vitamins oral tablet: 1 tab(s) orally once a day  pantoprazole 40 mg oral delayed release tablet: 1 tab(s) orally once a day  sucralfate 1 g/10 mL oral suspension: 10 milliliter(s) orally 4 times a day  thiamine 100 mg oral tablet: 1 tab(s) orally once a day

## 2020-03-07 NOTE — PROGRESS NOTE ADULT - SUBJECTIVE AND OBJECTIVE BOX
Pt seen earlier today   - tolerating diet  +ETOH withdrawal      MEDICATIONS  (STANDING):  atorvastatin 20 milliGRAM(s) Oral at bedtime  dextrose 5% + sodium chloride 0.9% with potassium chloride 20 mEq/L 1000 milliLiter(s) (100 mL/Hr) IV Continuous <Continuous>  folic acid 1 milliGRAM(s) Oral daily  LORazepam   Injectable 0.5 milliGRAM(s) IV Push every 12 hours  LORazepam   Injectable   IV Push   metoprolol tartrate 25 milliGRAM(s) Oral two times a day  multivitamin 1 Tablet(s) Oral daily  pantoprazole  Injectable 40 milliGRAM(s) IV Push every 12 hours  sucralfate suspension 1 Gram(s) Oral four times a day  thiamine 100 milliGRAM(s) Oral daily    MEDICATIONS  (PRN):  acetaminophen   Tablet .. 650 milliGRAM(s) Oral every 6 hours PRN Mild Pain (1 - 3)  ondansetron Injectable 4 milliGRAM(s) IV Push every 6 hours PRN Nausea and/or Vomiting      Allergies    No Known Allergies    Intolerances        Vital Signs Last 24 Hrs  T(C): 36.7 (07 Mar 2020 11:51), Max: 36.8 (07 Mar 2020 00:12)  T(F): 98 (07 Mar 2020 11:51), Max: 98.2 (07 Mar 2020 00:12)  HR: 56 (07 Mar 2020 11:51) (54 - 56)  BP: 127/83 (07 Mar 2020 11:51) (127/83 - 134/75)  BP(mean): --  RR: 17 (07 Mar 2020 11:51) (17 - 18)  SpO2: 98% (07 Mar 2020 11:51) (97% - 99%)    PHYSICAL EXAM:  General: NAD.  CVS: S1, S2  Chest: air entry bilaterally present  Abd: BS present, soft, non-tender      LABS:                        12.6   3.44  )-----------( 230      ( 07 Mar 2020 07:11 )             39.6     03-07    142  |  110<H>  |  5<L>  ----------------------------<  97  3.7   |  26  |  0.85    Ca    8.8      07 Mar 2020 07:11  Phos  3.2     03-07  Mg     1.9     03-07        tolerating diet  agree with d/c plan and outpatient followup

## 2020-03-07 NOTE — DISCHARGE NOTE PROVIDER - CARE PROVIDER_API CALL
Alonso Sy)  Internal Medicine  [address] [facility], [address] [place] [address]  Phone: [phone]  Fax: [phone]  Follow Up Time:

## 2020-03-10 ENCOUNTER — INPATIENT (INPATIENT)
Facility: HOSPITAL | Age: 55
LOS: 1 days | Discharge: HOME | End: 2020-03-12
Attending: INTERNAL MEDICINE | Admitting: INTERNAL MEDICINE
Payer: MEDICAID

## 2020-03-10 VITALS
TEMPERATURE: 99 F | DIASTOLIC BLOOD PRESSURE: 88 MMHG | RESPIRATION RATE: 19 BRPM | WEIGHT: 169.09 LBS | OXYGEN SATURATION: 100 % | HEART RATE: 109 BPM | SYSTOLIC BLOOD PRESSURE: 115 MMHG | HEIGHT: 69 IN

## 2020-03-10 LAB
ALBUMIN SERPL ELPH-MCNC: 3.9 G/DL — SIGNIFICANT CHANGE UP (ref 3.3–5)
ALP SERPL-CCNC: 50 U/L — SIGNIFICANT CHANGE UP (ref 40–120)
ALT FLD-CCNC: 55 U/L — SIGNIFICANT CHANGE UP (ref 12–78)
AMYLASE P1 CFR SERPL: 171 U/L — HIGH (ref 25–115)
ANION GAP SERPL CALC-SCNC: 15 MMOL/L — SIGNIFICANT CHANGE UP (ref 5–17)
AST SERPL-CCNC: 42 U/L — HIGH (ref 15–37)
BASOPHILS # BLD AUTO: 0.05 K/UL — SIGNIFICANT CHANGE UP (ref 0–0.2)
BASOPHILS NFR BLD AUTO: 1 % — SIGNIFICANT CHANGE UP (ref 0–2)
BILIRUB SERPL-MCNC: 0.3 MG/DL — SIGNIFICANT CHANGE UP (ref 0.2–1.2)
BUN SERPL-MCNC: 8 MG/DL — SIGNIFICANT CHANGE UP (ref 7–23)
CALCIUM SERPL-MCNC: 9 MG/DL — SIGNIFICANT CHANGE UP (ref 8.5–10.1)
CHLORIDE SERPL-SCNC: 107 MMOL/L — SIGNIFICANT CHANGE UP (ref 96–108)
CO2 SERPL-SCNC: 21 MMOL/L — LOW (ref 22–31)
CREAT SERPL-MCNC: 0.88 MG/DL — SIGNIFICANT CHANGE UP (ref 0.5–1.3)
EOSINOPHIL # BLD AUTO: 0.03 K/UL — SIGNIFICANT CHANGE UP (ref 0–0.5)
EOSINOPHIL NFR BLD AUTO: 0.6 % — SIGNIFICANT CHANGE UP (ref 0–6)
ETHANOL SERPL-MCNC: 270 MG/DL — HIGH (ref 0–10)
GLUCOSE SERPL-MCNC: 98 MG/DL — SIGNIFICANT CHANGE UP (ref 70–99)
HCT VFR BLD CALC: 46.7 % — SIGNIFICANT CHANGE UP (ref 39–50)
HGB BLD-MCNC: 15 G/DL — SIGNIFICANT CHANGE UP (ref 13–17)
IMM GRANULOCYTES NFR BLD AUTO: 0.2 % — SIGNIFICANT CHANGE UP (ref 0–1.5)
LACTATE SERPL-SCNC: 5.3 MMOL/L — CRITICAL HIGH (ref 0.7–2)
LIDOCAIN IGE QN: 204 U/L — SIGNIFICANT CHANGE UP (ref 73–393)
LYMPHOCYTES # BLD AUTO: 1.98 K/UL — SIGNIFICANT CHANGE UP (ref 1–3.3)
LYMPHOCYTES # BLD AUTO: 38.4 % — SIGNIFICANT CHANGE UP (ref 13–44)
MAGNESIUM SERPL-MCNC: 2.3 MG/DL — SIGNIFICANT CHANGE UP (ref 1.6–2.6)
MCHC RBC-ENTMCNC: 27.5 PG — SIGNIFICANT CHANGE UP (ref 27–34)
MCHC RBC-ENTMCNC: 32.1 GM/DL — SIGNIFICANT CHANGE UP (ref 32–36)
MCV RBC AUTO: 85.7 FL — SIGNIFICANT CHANGE UP (ref 80–100)
MONOCYTES # BLD AUTO: 0.34 K/UL — SIGNIFICANT CHANGE UP (ref 0–0.9)
MONOCYTES NFR BLD AUTO: 6.6 % — SIGNIFICANT CHANGE UP (ref 2–14)
NEUTROPHILS # BLD AUTO: 2.74 K/UL — SIGNIFICANT CHANGE UP (ref 1.8–7.4)
NEUTROPHILS NFR BLD AUTO: 53.2 % — SIGNIFICANT CHANGE UP (ref 43–77)
NRBC # BLD: 0 /100 WBCS — SIGNIFICANT CHANGE UP (ref 0–0)
PLATELET # BLD AUTO: 298 K/UL — SIGNIFICANT CHANGE UP (ref 150–400)
POTASSIUM SERPL-MCNC: 3.6 MMOL/L — SIGNIFICANT CHANGE UP (ref 3.5–5.3)
POTASSIUM SERPL-SCNC: 3.6 MMOL/L — SIGNIFICANT CHANGE UP (ref 3.5–5.3)
PROT SERPL-MCNC: 8.4 GM/DL — HIGH (ref 6–8.3)
RBC # BLD: 5.45 M/UL — SIGNIFICANT CHANGE UP (ref 4.2–5.8)
RBC # FLD: 15.1 % — HIGH (ref 10.3–14.5)
SODIUM SERPL-SCNC: 143 MMOL/L — SIGNIFICANT CHANGE UP (ref 135–145)
TROPONIN I SERPL-MCNC: <.015 NG/ML — SIGNIFICANT CHANGE UP (ref 0.01–0.04)
WBC # BLD: 5.15 K/UL — SIGNIFICANT CHANGE UP (ref 3.8–10.5)
WBC # FLD AUTO: 5.15 K/UL — SIGNIFICANT CHANGE UP (ref 3.8–10.5)

## 2020-03-10 PROCEDURE — 99285 EMERGENCY DEPT VISIT HI MDM: CPT

## 2020-03-10 PROCEDURE — 93010 ELECTROCARDIOGRAM REPORT: CPT

## 2020-03-10 PROCEDURE — 71045 X-RAY EXAM CHEST 1 VIEW: CPT | Mod: 26

## 2020-03-10 RX ORDER — ONDANSETRON 8 MG/1
8 TABLET, FILM COATED ORAL ONCE
Refills: 0 | Status: COMPLETED | OUTPATIENT
Start: 2020-03-10 | End: 2020-03-10

## 2020-03-10 RX ORDER — FOLIC ACID 0.8 MG
1 TABLET ORAL ONCE
Refills: 0 | Status: COMPLETED | OUTPATIENT
Start: 2020-03-10 | End: 2020-03-10

## 2020-03-10 RX ORDER — SODIUM CHLORIDE 9 MG/ML
1000 INJECTION INTRAMUSCULAR; INTRAVENOUS; SUBCUTANEOUS ONCE
Refills: 0 | Status: COMPLETED | OUTPATIENT
Start: 2020-03-10 | End: 2020-03-10

## 2020-03-10 RX ORDER — IOHEXOL 300 MG/ML
30 INJECTION, SOLUTION INTRAVENOUS ONCE
Refills: 0 | Status: COMPLETED | OUTPATIENT
Start: 2020-03-10 | End: 2020-03-10

## 2020-03-10 RX ORDER — THIAMINE MONONITRATE (VIT B1) 100 MG
100 TABLET ORAL ONCE
Refills: 0 | Status: COMPLETED | OUTPATIENT
Start: 2020-03-10 | End: 2020-03-10

## 2020-03-10 RX ORDER — PANTOPRAZOLE SODIUM 20 MG/1
8 TABLET, DELAYED RELEASE ORAL
Qty: 80 | Refills: 0 | Status: DISCONTINUED | OUTPATIENT
Start: 2020-03-10 | End: 2020-03-12

## 2020-03-10 RX ORDER — MORPHINE SULFATE 50 MG/1
4 CAPSULE, EXTENDED RELEASE ORAL ONCE
Refills: 0 | Status: DISCONTINUED | OUTPATIENT
Start: 2020-03-10 | End: 2020-03-10

## 2020-03-10 RX ADMIN — ONDANSETRON 8 MILLIGRAM(S): 8 TABLET, FILM COATED ORAL at 21:58

## 2020-03-10 RX ADMIN — Medication 100 MILLIGRAM(S): at 23:10

## 2020-03-10 RX ADMIN — Medication 1 TABLET(S): at 23:10

## 2020-03-10 RX ADMIN — Medication 25 MILLIGRAM(S): at 21:57

## 2020-03-10 RX ADMIN — Medication 30 MILLILITER(S): at 21:57

## 2020-03-10 RX ADMIN — SODIUM CHLORIDE 1000 MILLILITER(S): 9 INJECTION INTRAMUSCULAR; INTRAVENOUS; SUBCUTANEOUS at 21:58

## 2020-03-10 RX ADMIN — IOHEXOL 30 MILLILITER(S): 300 INJECTION, SOLUTION INTRAVENOUS at 21:58

## 2020-03-10 RX ADMIN — Medication 1 MILLIGRAM(S): at 21:57

## 2020-03-10 RX ADMIN — Medication 1 MILLIGRAM(S): at 23:09

## 2020-03-10 NOTE — ED PROVIDER NOTE - PHYSICAL EXAMINATION
Gen: Alert, distressed, vomiting, ill appearing  Head: NC, AT, PERRL, EOMI, normal lids/conjunctiva  ENT: normal hearing, patent oropharynx without erythema/exudate, uvula midline  Neck: +supple, +Trachea midline  Pulm: Bilateral BS, normal resp effort, no wheeze/stridor/retractions  CV: RRR, no M/R/G, +dist pulses  Abd: soft, +epigastric tenderness, ND, Negative Hernando signs, +BS, no palpable masses  Mskel: no edema/erythema/cyanosis  Skin: no rash, warm/dry  Neuro: AAOx3, no apparent sensory/motor deficits, coordination intact

## 2020-03-10 NOTE — ED PROVIDER NOTE - OBJECTIVE STATEMENT
Pertinent PMH/PSH/FHx/SHx and Review of Systems contained within:  Patient presents to the ED for abdominal pain and vomiting.  Patient known to ER for recurrent gastritis and epigastric pain from history of drinking. Patient was recent admission to hospital, discharged on March 7, says that he has not had a drink in 2 days, feels shaky, tremulous, diffuse pain in chest and abdomen with nausea, nonbloody vomiting.  Denies diarrhea or urinary symptoms, denies dyspnea.    ROS: No fever/chills, No headache/photophobia/eye pain/changes in vision, No ear pain/sore throat/dysphagia, No chest pain/palpitations, no SOB/cough/wheeze/stridor, D/melena, no dysuria/frequency/discharge, No neck/back pain, no rash, no changes in neurological status/function.

## 2020-03-10 NOTE — ED PROVIDER NOTE - CLINICAL SUMMARY MEDICAL DECISION MAKING FREE TEXT BOX
Patient with recurrent gastritis, etoh +.  VSS.  Patient having intractable pain in ER.  Pain typical of past presentations, however has suggestive appendiceal findings.  No fever or leukocytosis.  Gen surg consult being obtained.  Patient is to be admitted to the hospital and the case was discussed with the admitting physician.  Any changes in plan, additional imaging/labs, and further work up will be at the discretion of the admitting physician. Patient with recurrent gastritis, etoh +.  VSS.  Patient having intractable pain in ER.  Pain typical of past presentations, however has CT suggesting appendiceal findings.  No fever or leukocytosis.  Gen surg consult being obtained.  Patient is to be admitted to the hospital and the case was discussed with the admitting physician.  Any changes in plan, additional imaging/labs, and further work up will be at the discretion of the admitting physician.

## 2020-03-10 NOTE — ED ADULT TRIAGE NOTE - CHIEF COMPLAINT QUOTE
BIBA- c/o ABD pain, vomiting and chest burning , pt has hx etoh abuse , admits last drink was a few days ago "

## 2020-03-10 NOTE — ED ADULT NURSE NOTE - NSIMPLEMENTINTERV_GEN_ALL_ED
Implemented All Fall with Harm Risk Interventions:  [place] to call system. Call bell, personal items and telephone within reach. Instruct patient to call for assistance. Room bathroom lighting operational. Non-slip footwear when patient is off stretcher. Physically safe environment: no spills, clutter or unnecessary equipment. Stretcher in lowest position, wheels locked, appropriate side rails in place. Provide visual cue, wrist band, yellow gown, etc. Monitor gait and stability. Monitor for mental status changes and reorient to person, place, and time. Review medications for side effects contributing to fall risk. Reinforce activity limits and safety measures with patient and family. Provide visual clues: red socks.

## 2020-03-11 DIAGNOSIS — E78.5 HYPERLIPIDEMIA, UNSPECIFIED: ICD-10-CM

## 2020-03-11 DIAGNOSIS — F10.230 ALCOHOL DEPENDENCE WITH WITHDRAWAL, UNCOMPLICATED: ICD-10-CM

## 2020-03-11 DIAGNOSIS — Z29.9 ENCOUNTER FOR PROPHYLACTIC MEASURES, UNSPECIFIED: ICD-10-CM

## 2020-03-11 DIAGNOSIS — I10 ESSENTIAL (PRIMARY) HYPERTENSION: ICD-10-CM

## 2020-03-11 DIAGNOSIS — K29.20 ALCOHOLIC GASTRITIS WITHOUT BLEEDING: ICD-10-CM

## 2020-03-11 LAB
ALBUMIN SERPL ELPH-MCNC: 3 G/DL — LOW (ref 3.3–5)
ALP SERPL-CCNC: 38 U/L — LOW (ref 40–120)
ALT FLD-CCNC: 43 U/L — SIGNIFICANT CHANGE UP (ref 12–78)
ANION GAP SERPL CALC-SCNC: 9 MMOL/L — SIGNIFICANT CHANGE UP (ref 5–17)
AST SERPL-CCNC: 36 U/L — SIGNIFICANT CHANGE UP (ref 15–37)
BILIRUB SERPL-MCNC: 0.3 MG/DL — SIGNIFICANT CHANGE UP (ref 0.2–1.2)
BUN SERPL-MCNC: 5 MG/DL — LOW (ref 7–23)
CALCIUM SERPL-MCNC: 8 MG/DL — LOW (ref 8.5–10.1)
CHLORIDE SERPL-SCNC: 109 MMOL/L — HIGH (ref 96–108)
CO2 SERPL-SCNC: 24 MMOL/L — SIGNIFICANT CHANGE UP (ref 22–31)
CREAT SERPL-MCNC: 0.73 MG/DL — SIGNIFICANT CHANGE UP (ref 0.5–1.3)
GLUCOSE SERPL-MCNC: 82 MG/DL — SIGNIFICANT CHANGE UP (ref 70–99)
HCT VFR BLD CALC: 36.5 % — LOW (ref 39–50)
HGB BLD-MCNC: 11.7 G/DL — LOW (ref 13–17)
LACTATE SERPL-SCNC: 1.9 MMOL/L — SIGNIFICANT CHANGE UP (ref 0.7–2)
LACTATE SERPL-SCNC: 3.9 MMOL/L — HIGH (ref 0.7–2)
LACTATE SERPL-SCNC: 4.6 MMOL/L — CRITICAL HIGH (ref 0.7–2)
MCHC RBC-ENTMCNC: 27.8 PG — SIGNIFICANT CHANGE UP (ref 27–34)
MCHC RBC-ENTMCNC: 32.1 GM/DL — SIGNIFICANT CHANGE UP (ref 32–36)
MCV RBC AUTO: 86.7 FL — SIGNIFICANT CHANGE UP (ref 80–100)
NRBC # BLD: 0 /100 WBCS — SIGNIFICANT CHANGE UP (ref 0–0)
PLATELET # BLD AUTO: 250 K/UL — SIGNIFICANT CHANGE UP (ref 150–400)
POTASSIUM SERPL-MCNC: 3.6 MMOL/L — SIGNIFICANT CHANGE UP (ref 3.5–5.3)
POTASSIUM SERPL-SCNC: 3.6 MMOL/L — SIGNIFICANT CHANGE UP (ref 3.5–5.3)
PROT SERPL-MCNC: 6.3 GM/DL — SIGNIFICANT CHANGE UP (ref 6–8.3)
RBC # BLD: 4.21 M/UL — SIGNIFICANT CHANGE UP (ref 4.2–5.8)
RBC # FLD: 15.1 % — HIGH (ref 10.3–14.5)
SODIUM SERPL-SCNC: 142 MMOL/L — SIGNIFICANT CHANGE UP (ref 135–145)
WBC # BLD: 4.33 K/UL — SIGNIFICANT CHANGE UP (ref 3.8–10.5)
WBC # FLD AUTO: 4.33 K/UL — SIGNIFICANT CHANGE UP (ref 3.8–10.5)

## 2020-03-11 PROCEDURE — 74176 CT ABD & PELVIS W/O CONTRAST: CPT | Mod: 26

## 2020-03-11 PROCEDURE — 99223 1ST HOSP IP/OBS HIGH 75: CPT

## 2020-03-11 PROCEDURE — 12345: CPT | Mod: NC

## 2020-03-11 PROCEDURE — 36410 VNPNXR 3YR/> PHY/QHP DX/THER: CPT | Mod: 59

## 2020-03-11 PROCEDURE — 74177 CT ABD & PELVIS W/CONTRAST: CPT | Mod: 26

## 2020-03-11 PROCEDURE — 36569 INSJ PICC 5 YR+ W/O IMAGING: CPT

## 2020-03-11 PROCEDURE — 76937 US GUIDE VASCULAR ACCESS: CPT | Mod: 26

## 2020-03-11 PROCEDURE — 99222 1ST HOSP IP/OBS MODERATE 55: CPT

## 2020-03-11 PROCEDURE — 36481 INSERTION OF CATHETER VEIN: CPT

## 2020-03-11 RX ORDER — ATORVASTATIN CALCIUM 80 MG/1
20 TABLET, FILM COATED ORAL AT BEDTIME
Refills: 0 | Status: DISCONTINUED | OUTPATIENT
Start: 2020-03-11 | End: 2020-03-12

## 2020-03-11 RX ORDER — THIAMINE MONONITRATE (VIT B1) 100 MG
100 TABLET ORAL DAILY
Refills: 0 | Status: DISCONTINUED | OUTPATIENT
Start: 2020-03-11 | End: 2020-03-12

## 2020-03-11 RX ORDER — METOPROLOL TARTRATE 50 MG
25 TABLET ORAL EVERY 12 HOURS
Refills: 0 | Status: DISCONTINUED | OUTPATIENT
Start: 2020-03-11 | End: 2020-03-12

## 2020-03-11 RX ORDER — SUCRALFATE 1 G
1 TABLET ORAL
Refills: 0 | Status: DISCONTINUED | OUTPATIENT
Start: 2020-03-11 | End: 2020-03-12

## 2020-03-11 RX ORDER — HYDROMORPHONE HYDROCHLORIDE 2 MG/ML
1 INJECTION INTRAMUSCULAR; INTRAVENOUS; SUBCUTANEOUS ONCE
Refills: 0 | Status: DISCONTINUED | OUTPATIENT
Start: 2020-03-11 | End: 2020-03-11

## 2020-03-11 RX ORDER — ONDANSETRON 8 MG/1
4 TABLET, FILM COATED ORAL EVERY 6 HOURS
Refills: 0 | Status: DISCONTINUED | OUTPATIENT
Start: 2020-03-11 | End: 2020-03-12

## 2020-03-11 RX ORDER — PHENOBARBITAL 60 MG
200 TABLET ORAL ONCE
Refills: 0 | Status: DISCONTINUED | OUTPATIENT
Start: 2020-03-11 | End: 2020-03-11

## 2020-03-11 RX ORDER — SODIUM CHLORIDE 9 MG/ML
2000 INJECTION, SOLUTION INTRAVENOUS ONCE
Refills: 0 | Status: COMPLETED | OUTPATIENT
Start: 2020-03-11 | End: 2020-03-11

## 2020-03-11 RX ORDER — FOLIC ACID 0.8 MG
1 TABLET ORAL DAILY
Refills: 0 | Status: DISCONTINUED | OUTPATIENT
Start: 2020-03-11 | End: 2020-03-12

## 2020-03-11 RX ORDER — HEPARIN SODIUM 5000 [USP'U]/ML
5000 INJECTION INTRAVENOUS; SUBCUTANEOUS EVERY 12 HOURS
Refills: 0 | Status: DISCONTINUED | OUTPATIENT
Start: 2020-03-11 | End: 2020-03-12

## 2020-03-11 RX ORDER — IOHEXOL 300 MG/ML
30 INJECTION, SOLUTION INTRAVENOUS ONCE
Refills: 0 | Status: COMPLETED | OUTPATIENT
Start: 2020-03-11 | End: 2020-03-11

## 2020-03-11 RX ADMIN — IOHEXOL 30 MILLILITER(S): 300 INJECTION, SOLUTION INTRAVENOUS at 12:44

## 2020-03-11 RX ADMIN — SODIUM CHLORIDE 1000 MILLILITER(S): 9 INJECTION INTRAMUSCULAR; INTRAVENOUS; SUBCUTANEOUS at 00:45

## 2020-03-11 RX ADMIN — Medication 1 TABLET(S): at 12:30

## 2020-03-11 RX ADMIN — SODIUM CHLORIDE 1000 MILLILITER(S): 9 INJECTION INTRAMUSCULAR; INTRAVENOUS; SUBCUTANEOUS at 00:00

## 2020-03-11 RX ADMIN — Medication 100 MILLIGRAM(S): at 12:31

## 2020-03-11 RX ADMIN — SODIUM CHLORIDE 1000 MILLILITER(S): 9 INJECTION, SOLUTION INTRAVENOUS at 10:31

## 2020-03-11 RX ADMIN — HEPARIN SODIUM 5000 UNIT(S): 5000 INJECTION INTRAVENOUS; SUBCUTANEOUS at 05:38

## 2020-03-11 RX ADMIN — PANTOPRAZOLE SODIUM 10 MG/HR: 20 TABLET, DELAYED RELEASE ORAL at 00:00

## 2020-03-11 RX ADMIN — Medication 25 MILLIGRAM(S): at 05:38

## 2020-03-11 RX ADMIN — HYDROMORPHONE HYDROCHLORIDE 1 MILLIGRAM(S): 2 INJECTION INTRAMUSCULAR; INTRAVENOUS; SUBCUTANEOUS at 00:41

## 2020-03-11 RX ADMIN — PANTOPRAZOLE SODIUM 10 MG/HR: 20 TABLET, DELAYED RELEASE ORAL at 16:04

## 2020-03-11 RX ADMIN — HYDROMORPHONE HYDROCHLORIDE 1 MILLIGRAM(S): 2 INJECTION INTRAMUSCULAR; INTRAVENOUS; SUBCUTANEOUS at 05:25

## 2020-03-11 RX ADMIN — Medication 1 GRAM(S): at 12:31

## 2020-03-11 RX ADMIN — HEPARIN SODIUM 5000 UNIT(S): 5000 INJECTION INTRAVENOUS; SUBCUTANEOUS at 16:04

## 2020-03-11 RX ADMIN — MORPHINE SULFATE 4 MILLIGRAM(S): 50 CAPSULE, EXTENDED RELEASE ORAL at 00:00

## 2020-03-11 RX ADMIN — Medication 1 GRAM(S): at 16:03

## 2020-03-11 RX ADMIN — Medication 3 MILLIGRAM(S): at 12:30

## 2020-03-11 RX ADMIN — Medication 1 GRAM(S): at 05:38

## 2020-03-11 RX ADMIN — Medication 200 MILLIGRAM(S): at 16:02

## 2020-03-11 RX ADMIN — MORPHINE SULFATE 4 MILLIGRAM(S): 50 CAPSULE, EXTENDED RELEASE ORAL at 00:41

## 2020-03-11 RX ADMIN — SODIUM CHLORIDE 1000 MILLILITER(S): 9 INJECTION INTRAMUSCULAR; INTRAVENOUS; SUBCUTANEOUS at 02:30

## 2020-03-11 RX ADMIN — SODIUM CHLORIDE 2000 MILLILITER(S): 9 INJECTION, SOLUTION INTRAVENOUS at 05:12

## 2020-03-11 RX ADMIN — Medication 3 MILLIGRAM(S): at 05:18

## 2020-03-11 RX ADMIN — PANTOPRAZOLE SODIUM 10 MG/HR: 20 TABLET, DELAYED RELEASE ORAL at 12:32

## 2020-03-11 RX ADMIN — ATORVASTATIN CALCIUM 20 MILLIGRAM(S): 80 TABLET, FILM COATED ORAL at 21:54

## 2020-03-11 RX ADMIN — Medication 1 MILLIGRAM(S): at 12:30

## 2020-03-11 RX ADMIN — Medication 25 MILLIGRAM(S): at 16:03

## 2020-03-11 RX ADMIN — ONDANSETRON 4 MILLIGRAM(S): 8 TABLET, FILM COATED ORAL at 06:30

## 2020-03-11 NOTE — CONSULT NOTE ADULT - ATTENDING COMMENTS
Patient was seen and examined with the surgical team earlier in the day.  He has non-specific mild diffuse abdominal tenderness.  The initial and repeat CT scans were reviewed with radiology and were verbally felt to reveal a "normal appendix."  As per the final report of the 17 hour follow-up CT scan, there was "no significant change in the appearance of the appendix which is upper normal limits in diameter without definite associated inflammatory change."  Considering that the appendix and its surrounding tissues continue to appear within normal limits on follow-up imaging and that the patient has remained afebrile and without leukocytosis off antibiotics, I have a low clinical suspicion for acute appendicitis.  Suspect his mild diffuse abdominal tenderness is secondary to known gastritis and esophagitis.  Will therefore defer management to primary team an GI.  Please call with questions. Patient was seen and examined with the surgical team earlier in the day.  He has non-specific mild diffuse abdominal tenderness.  The initial and repeat CT scans were reviewed with radiology and were verbally felt to reveal a "normal appendix."  As per the final report of the 17 hour follow-up CT scan, there was "no significant change in the appearance of the appendix which is upper normal limits in diameter without definite associated inflammatory change."  Considering that the appendix and its surrounding tissues continue to appear within normal limits on follow-up imaging and that the patient has remained afebrile and without leukocytosis off antibiotics, I have a low clinical suspicion for acute appendicitis.  Suspect his mild diffuse abdominal tenderness is secondary to known gastritis and esophagitis.  Will therefore defer management to primary team and GI.  Please call with questions. Patient was seen and examined with the surgical team earlier in the day.  He has non-specific mild diffuse abdominal tenderness.  The initial and repeat CT scans were reviewed with radiology and were verbally felt to reveal a "normal appendix."  As per the final report of the 17 hour follow-up CT scan, there was "no significant change in the appearance of the appendix which is upper normal limits in diameter without definite associated inflammatory change."  Considering that the appendix and its surrounding tissues continue to appear within normal limits on follow-up imaging and that the patient has remained afebrile and without leukocytosis off antibiotics, I have a low clinical suspicion for acute appendicitis.  Suspect his mild diffuse abdominal tenderness is secondary to known gastritis and esophagitis.      Addendum:  After further review by several radiologists, when compared to prior imaging dating back over past several years, there has been an increase in the caliber of the appendix and there is new thickening near its base.  This was verbally felt to either represent an "early appendicitis" versus a mass near the appendiceal base.  As stated above, and considering that it has now been close to 24 hours since presentation, there is a low clinical suspicion for an acute appendicitis.  Recommend continuing to treat his gastritis and to observe off antibiotics. Patient was seen and examined with the surgical team earlier in the day.  He has non-specific mild diffuse abdominal tenderness.  The initial and repeat CT scans were reviewed with radiology and were verbally felt to reveal a "normal appendix."  As per the final report of the 17 hour follow-up CT scan, there was "no significant change in the appearance of the appendix which is upper normal limits in diameter without definite associated inflammatory change."  Considering that the appendix and its surrounding tissues continue to appear within normal limits on follow-up imaging and that the patient has remained afebrile and without leukocytosis off antibiotics, I have a low clinical suspicion for acute appendicitis.  Suspect his mild diffuse abdominal tenderness is secondary to known gastritis and esophagitis.

## 2020-03-11 NOTE — H&P ADULT - NSHPPHYSICALEXAM_GEN_ALL_CORE
Physical exam:  General: patient in no acute distress, resting comfortably  Head:  Atraumatic, Normocephalic  Eyes: EOMI, PERRLA, clear sclera  Neck: Supple, thyroid nontender, non enlarged  Cardio: S1/S2 +ve, regular rate and rhythm, no M/G/R  Resp: clear to ausculation bilaterally, no rales or wheezes  GI: abdomen soft, tender to palpation, non distended, no guarding, BS +ve x 4  Ext: no significant pedal edema  Neuro: CN 2-12 intact, no significant motor or sensory deficits.  mildly tremulous, mild anxiety, mildly diaphoretic denies headaches  Skin: No rashes or lesions

## 2020-03-11 NOTE — H&P ADULT - HISTORY OF PRESENT ILLNESS
54M PMH chronic ETOH abuse/dependence with hx of alcohol withdrawal with frequent hospital admissions, GERD, HLD, alcoholic gastritis/esophagitis, PUD, hx of hypoxic respiratory failure requiring intubation due to aspiration PNA, staph PNA presents for abdominal pain and vomiting.  Patient mildly confused, full history unable to be taken.  Patient reports abdominal pain, nausea, and vomiting resulting in poor PO intake.  Patient reports his last drink was 2-3 days ago (blood alcohol 270).  Labs reveal significant lactic acidosis improved with fluids.  CT abdomen nonspecific.  Will admit to tele for GI eval, monitoring for alcohol withdrawal

## 2020-03-11 NOTE — H&P ADULT - NSHPLABSRESULTS_GEN_ALL_CORE
Recent Vitals  T(C): 37.3 (03-10-20 @ 23:56), Max: 37.3 (03-10-20 @ 23:56)  HR: 97 (03-10-20 @ 23:56) (97 - 109)  BP: 138/90 (03-10-20 @ 23:56) (115/88 - 138/90)  RR: 16 (03-10-20 @ 23:56) (16 - 19)  SpO2: 98% (03-10-20 @ 23:56) (98% - 100%)                        15.0   5.15  )-----------( 298      ( 10 Mar 2020 22:09 )             46.7     03-10    143  |  107  |  8   ----------------------------<  98  3.6   |  21<L>  |  0.88    Ca    9.0      10 Mar 2020 22:09  Mg     2.3     03-10    TPro  8.4<H>  /  Alb  3.9  /  TBili  0.3  /  DBili  x   /  AST  42<H>  /  ALT  55  /  AlkPhos  50  03-10      LIVER FUNCTIONS - ( 10 Mar 2020 22:09 )  Alb: 3.9 g/dL / Pro: 8.4 gm/dL / ALK PHOS: 50 U/L / ALT: 55 U/L / AST: 42 U/L / GGT: x               Home Medications:  atorvastatin 20 mg oral tablet: 1 tab(s) orally once a day (at bedtime) (03 Mar 2020 10:19)  folic acid 1 mg oral tablet: 1 tab(s) orally once a day (03 Mar 2020 10:19)  Multiple Vitamins oral tablet: 1 tab(s) orally once a day (03 Mar 2020 10:19)  thiamine 100 mg oral tablet: 1 tab(s) orally once a day (03 Mar 2020 10:19)

## 2020-03-11 NOTE — CHART NOTE - NSCHARTNOTEFT_GEN_A_CORE
Medicine Hospitalist PA    Requested by RN to place an IV heplock in patient. As per RN pt is a difficult stick. Placed IV heplock in left forearm. #20g placed in ultrasound guidance. IV flushed and secured. RN aware.

## 2020-03-11 NOTE — PROCEDURE NOTE - NSPROCDETAILS_GEN_ALL_CORE
sterile dressing applied/ultrasound assessment/sterile technique, catheter placed/ultrasound guidance/location identified, draped/prepped, sterile technique used

## 2020-03-11 NOTE — H&P ADULT - ASSESSMENT
54M PMH chronic ETOH abuse/dependence with hx of alcohol withdrawal with frequent hospital admissions, GERD, HLD, alcoholic gastritis/esophagitis, PUD, hx of hypoxic respiratory failure requiring intubation due to aspiration PNA, staph PNA presents for abdominal pain and vomiting.  Patient mildly confused, full history unable to be taken.  Patient reports abdominal pain, nausea, and vomiting resulting in poor PO intake.  Patient reports his last drink was 2-3 days ago (blood alcohol 270).  Labs reveal significant lactic acidosis improved with fluids.  CT abdomen nonspecific.  Will admit to tele for GI eval, monitoring for alcohol withdrawal      IMPROVE VTE Individual Risk Assessment          RISK                                                          Points  [  ] Previous VTE                                                3  [  ] Thrombophilia                                             2  [  ] Lower limb paralysis                                    2        (unable to hold up >15 seconds)    [  ] Current Cancer                                             2         (within 6 months)  [  ] Immobilization > 24 hrs                              1  [  ] ICU/CCU stay > 24 hours                            1  [  ] Age > 60                                                    1    IMPROVE VTE Score - 1

## 2020-03-11 NOTE — H&P ADULT - PROBLEM SELECTOR PLAN 3
CT shows unequivocal appendicitis.  Evaled by surgery team who recommend repeat CT when patient can tolerate PO contrast

## 2020-03-11 NOTE — CHART NOTE - NSCHARTNOTEFT_GEN_A_CORE
Repeat CT of the abdomen reviewed with Sanket Turner,  & Eden.  Appendix is reead as being normal.   No indication for emergent surgical intervention.  Will continue to follow. Repeat CT of the abdomen reviewed with Sanket Turner  & Eden.  Appendix is reead as being normal.   No indication for emergent surgical intervention.      Addendum:  Patient was seen and examined with the surgical team earlier in the day.  He has non-specific mild diffuse abdominal tenderness.  The initial and repeat CT scans were reviewed with radiology and were verbally felt to reveal a "normal appendix."  As per the final report of the 17 hour follow-up CT scan, there was "no significant change in the appearance of the appendix which is upper normal limits in diameter without definite associated inflammatory change."  Considering that the appendix and its surrounding tissues continue to appear within normal limits on follow-up imaging and that the patient has remained afebrile and without leukocytosis off antibiotics, I have a low clinical suspicion for acute appendicitis.  Suspect his mild diffuse abdominal tenderness is secondary to known gastritis and esophagitis.  Will therefore defer management to primary team an GI.  Please call with questions. Repeat CT of the abdomen reviewed with Sanket Turner  & Eden.  Appendix is reead as being normal.   No indication for emergent surgical intervention.      Addendum:  Patient was seen and examined with the surgical team earlier in the day.  He has non-specific mild diffuse abdominal tenderness.  The initial and repeat CT scans were reviewed with radiology and were verbally felt to reveal a "normal appendix."  As per the final report of the 17 hour follow-up CT scan, there was "no significant change in the appearance of the appendix which is upper normal limits in diameter without definite associated inflammatory change."  Considering that the appendix and its surrounding tissues continue to appear within normal limits on follow-up imaging and that the patient has remained afebrile and without leukocytosis off antibiotics, I have a low clinical suspicion for acute appendicitis.  Suspect his mild diffuse abdominal tenderness is secondary to known gastritis and esophagitis.  Will therefore defer management to primary team and GI.  Please call with questions. Repeat CT of the abdomen reviewed with Sanket Turner  & Eden.  Appendix is reead as being normal.   No indication for emergent surgical intervention.      Addendum:  Patient was seen and examined with the surgical team earlier in the day.  He has non-specific mild diffuse abdominal tenderness.  The initial and repeat CT scans were reviewed with radiology and were verbally felt to reveal a "normal appendix."  As per the final report of the 17 hour follow-up CT scan, there was "no significant change in the appearance of the appendix which is upper normal limits in diameter without definite associated inflammatory change."  Considering that the appendix and its surrounding tissues continue to appear within normal limits on follow-up imaging and that the patient has remained afebrile and without leukocytosis off antibiotics, I have a low clinical suspicion for acute appendicitis.  Suspect his mild diffuse abdominal tenderness is secondary to known gastritis and esophagitis.       Addendum:  After further review by several radiologists, when compared to prior imaging dating back over past several years, there has been an increase in the caliber of the appendix and there is new thickening near its base.  This was verbally felt to either represent an "early appendicitis" versus a mass near the appendiceal base.  As stated above, and considering that it has now been close to 24 hours since presentation, there is a low clinical suspicion for an acute appendicitis.  Recommend continuing to treat his gastritis and to observe off antibiotics. Repeat CT of the abdomen reviewed with Sanket Turner  & Eden.  Appendix is reead as being normal.   No indication for emergent surgical intervention.      Addendum:  Patient was seen and examined with the surgical team earlier in the day.  He has non-specific mild diffuse abdominal tenderness.  The initial and repeat CT scans were reviewed with radiology and were verbally felt to reveal a "normal appendix."  As per the final report of the 17 hour follow-up CT scan, there was "no significant change in the appearance of the appendix which is upper normal limits in diameter without definite associated inflammatory change."  Considering that the appendix and its surrounding tissues continue to appear within normal limits on follow-up imaging and that the patient has remained afebrile and without leukocytosis off antibiotics, I have a low clinical suspicion for acute appendicitis.  Suspect his mild diffuse abdominal tenderness is secondary to known gastritis and esophagitis.

## 2020-03-11 NOTE — CONSULT NOTE ADULT - SUBJECTIVE AND OBJECTIVE BOX
full consult dictated    Plan:  Pt admitted with n/v; abd pain.  +alcohol abuse.  Ct scan noted.  Will continue with IV protonix and PO carafate for the time being. No clinical evidence of appendicitis. Alcohol detox protocol.

## 2020-03-11 NOTE — PROGRESS NOTE ADULT - ASSESSMENT
54M PMH chronic ETOH abuse/dependence with hx of alcohol withdrawal with frequent hospital admissions, GERD, HLD, alcoholic gastritis/esophagitis, PUD, hx of hypoxic respiratory failure requiring intubation due to aspiration PNA, staph PNA presented for abdominal pain and vomiting.  Patient mildly confused, full history unable to be taken at admission.  Patient reported abdominal pain, nausea, and vomiting resulting in poor PO intake.  Patient reported his last drink was 2-3 days PTA (blood alcohol 270).  Labs reveal significant lactic acidosis improved with fluids.  CT abdomen nonspecific appendicitis and CT chest showed esophagitis. Patient was given iv ativan for acute alcohol withdrawal. GI and surgery were consulted. PPI infusion was initiated.         ·  Problem: Acute alcoholic gastritis/esophagitis without hemorrhage.  liquid diet, iv PPI. GI following.       ·  Problem: Alcohol withdrawal syndrome without complication.   at high risk for severe withdrawal. Give phenobarbitone. cont prn iv ativan. fall, seizure and aspiration precautions. tele reviewed be me showed no significant arrhythmias today.     ·  Problem: R/O Appendicitis, unspecified appendicitis type. Midline and then CT abd with oral and iv contrast. surgery following.     ·  Problem: Dyslipidemia.  Plan: lipitor.       ·  Problem: Essential hypertension.  Plan: metoprolol.       Problem: Preventive measure. Plan: VTE prop: heparin sc q12 hrs.

## 2020-03-11 NOTE — CONSULT NOTE ADULT - ASSESSMENT
Impression: 55 y/o male with PMH alcohol abuse, gastritis, PUD, HLD presenting with abdominal pain, N/V. +alc tox.  CT (without PO or IV contrast) showing esophageal distention vs esophagitis, stomach distention; borderline appendix without surrounding inflammatory changes, equivocal for appendicitis.  Afebrile, no leukocytosis. +epigastric tenderness, -RLQ tenderness.     Plan  -Patient's clinical picture currently not suggestive of acute appendicitis.  Per my discussion with Dr. Quan, recommend repeating CT scan with PO and IV contrast when patient can tolerate for further evaluation of appendix.    -Serial abdominal exams.    -F/u am labs; trend lactate.    -IVF resuscitation.     -Medical management per medicine.    -Discussed with Dr. Quan.

## 2020-03-11 NOTE — CONSULT NOTE ADULT - SUBJECTIVE AND OBJECTIVE BOX
GENERAL SURGERY CONSULT NOTE    Patient is a 54y old Male who presents with a chief complaint of abdominal pain, nausea and vomiting.      HPI:  55 y/o male PMH gastritis, PUD, alcohol abuse, HLD, no PSH presenting to ED c/o diffuse abdominal pain, N/V x 1 week.  Pt reports pain is worst in the epigastric region.  Pt was recently admitted to [facility] and discharged on 3/7/2020 with similar complaints of abdominal pain with associated nausea and vomiting. He is reportedly well-known to ED for multiple visits for gastritis and epigastric pain associated with alcohol abuse.  Denies fever, chills, shortness of breath, hematochezia, melena, change in bowel or urinary habits.  Denies alcohol use in past 2 days.  (+alc tox)      PAST MEDICAL & SURGICAL HISTORY:  DTs (delirium tremens)  Substance abuse  Gastritis  EtOH dependence  Mixed hyperlipidemia  PUD (peptic ulcer disease)  No significant past surgical history      Review of Systems:    I have reviewed 9 systems with the patient and the only positive findings were     MEDICATIONS  (STANDING):  pantoprazole Infusion 8 mG/Hr (10 mL/Hr) IV Continuous <Continuous>    MEDICATIONS  (PRN):      Allergies: No Known Allergies/Intolerances      SOCIAL HISTORY          Smoking: Yes [ ]  No [ ]   ______pk yrs          ETOH  Yes [X]  No [ ]  Social [ ]          DRUGS:  Yes [ ]  No [ ]  if so what______________    FAMILY HISTORY:  No pertinent family history in first degree relatives (Father, Mother)      Vital Signs Last 24 Hrs  T(C): 37.3 (10 Mar 2020 23:56), Max: 37.3 (10 Mar 2020 23:56)  T(F): 99.2 (10 Mar 2020 23:56), Max: 99.2 (10 Mar 2020 23:56)  HR: 97 (10 Mar 2020 23:56) (97 - 109)  BP: 138/90 (10 Mar 2020 23:56) (115/88 - 138/90)  RR: 16 (10 Mar 2020 23:56) (16 - 19)  SpO2: 98% (10 Mar 2020 23:56) (98% - 100%)    Physical Exam:    General:  Appears stated age, NAD, patient shaking mildly   Eyes : ANDREA  HENT:  WNL, no JVD  Chest:  clear breath sounds  Cardiovascular:  Regular rate & rhythm  Abdomen:  soft, non-distended, mild tenderness to palpation epigastric region, -Gibson's, -McBurney's, -Rovsing's, -Psoas   Skin:  No rash  Musculoskeletal:  normal strength  Neuro/Psych:  Alert & oriented x 3       LABS:                        15.0   5.15  )-----------( 298      ( 10 Mar 2020 22:09 )             46.7     03-10    143  |  107  |  8   ----------------------------<  98  3.6   |  21<L>  |  0.88    Ca    9.0      10 Mar 2020 22:09  Mg     2.3     03-10    TPro  8.4<H>  /  Alb  3.9  /  TBili  0.3  /  DBili  x   /  AST  42<H>  /  ALT  55  /  AlkPhos  50  03-10          RADIOLOGY & ADDITIONAL STUDIES:  < from: CT Abdomen and Pelvis w/ Oral Cont (03.11.20 @ 01:00) >  EXAM:  CT ABDOMEN AND PELVIS OC                            PROCEDURE DATE:  03/11/2020          INTERPRETATION:  CLINICAL INFORMATION: Epigastric pain.    PROCEDURE:   Helical axial images were obtained from the domes of the diaphragm through the pubic symphysis without intravenous or oral contrast. Coronal and sagittal reformats were also obtained. Patient could not drink p.o. contrast.    COMPARISON: 1/7/2020.    FINDINGS: Evaluation of the abdominal/pelvic organs, viscera and vasculature is limited without intravenous contrast.     LOWER CHEST: Subsegmental atelectasis.    LIVER: Low-attenuation indicating fatty infiltration, with sparing near the gallbladder fossa.  GALLBLADDER/BILE DUCTS: No intrahepatic or extrahepatic biliary dilatation. No radiopaque gallstone.  PANCREAS: Unremarkable.  SPLEEN: Unremarkable.    ADRENALS: Unremarkable.  KIDNEYS/URETERS: No hydronephrosis, hydroureter or significant perinephric stranding. No radiopaque urinary tract stone.   BLADDER: Partially distended.  REPRODUCTIVE ORGANS: Unremarkable.    BOWEL: Prominent wall of the visualized, fluid-filled distal esophagus. No bowel obstruction. Borderline proximal/mid appendix tapering distally without inflammatory change. Partially distended stomach, limiting evaluation. Diverticulosis of the cecum/ascending colon.  PERITONEUM: No drainable fluid collection or free air.  VESSELS: Atherosclerosis. Normal caliber of the abdominal aorta.   RETROPERITONEUM: No lymphadenopathy.    ABDOMINAL WALL/SOFT TISSUES: Unremarkable.  BONES: Degenerative changes of the spine. Stable mild anterior wedging of the lower thoracic spine.    IMPRESSION:     Prominent wall of the visualized distal esophagus, which may be due to partial distention and/or esophagitis. Partially distended stomach, limiting evaluation. Recommend clinical correlation and follow-up upper endoscopy as clinically indicated.    Borderline appendix without surrounding inflammatory change, equivocal for appendicitis. Recommend clinical correlation for further evaluation.    Additional findings as described.            DENISE ISLAS M.D., ATTENDING RADIOLOGIST  This document has been electronically signed. Mar 11 2020  2:14AM    < end of copied text > GENERAL SURGERY CONSULT NOTE    Patient is a 54y old Male who presents with a chief complaint of abdominal pain, nausea and vomiting.      HPI:  53 y/o male PMH gastritis, PUD, alcohol abuse, HLD, no PSH presenting to ED c/o diffuse abdominal pain, N/V x 1 week.  Pt reports pain is worst in the epigastric region.  Pt was recently admitted to [facility] and discharged on 3/7/2020 with similar complaints of abdominal pain with associated nausea and vomiting. He is reportedly well-known to ED for multiple visits for gastritis and epigastric pain associated with alcohol abuse.  Denies fever, chills, shortness of breath, hematochezia, melena, change in bowel or urinary habits.  Denies alcohol use in past 2 days.  (+alc tox)    Surgery consulted for CT finding of equivocal appendicitis.    Denies PSH.   Reporting diffuse abdominal pain, worst in the epigastric region.    Loss of appetite, nausea, vomiting.     PAST MEDICAL & SURGICAL HISTORY:  DTs (delirium tremens)  Substance abuse  Gastritis  EtOH dependence  Mixed hyperlipidemia  PUD (peptic ulcer disease)  No significant past surgical history      Review of Systems:    I have reviewed 9 systems with the patient and the only positive findings were     MEDICATIONS  (STANDING):  pantoprazole Infusion 8 mG/Hr (10 mL/Hr) IV Continuous <Continuous>    MEDICATIONS  (PRN):      Allergies: No Known Allergies/Intolerances      SOCIAL HISTORY          Smoking: Yes [ ]  No [ ]   ______pk yrs          ETOH  Yes [X]  No [ ]  Social [ ]          DRUGS:  Yes [ ]  No [ ]  if so what______________    FAMILY HISTORY:  No pertinent family history in first degree relatives (Father, Mother)      Vital Signs Last 24 Hrs  T(C): 37.3 (10 Mar 2020 23:56), Max: 37.3 (10 Mar 2020 23:56)  T(F): 99.2 (10 Mar 2020 23:56), Max: 99.2 (10 Mar 2020 23:56)  HR: 97 (10 Mar 2020 23:56) (97 - 109)  BP: 138/90 (10 Mar 2020 23:56) (115/88 - 138/90)  RR: 16 (10 Mar 2020 23:56) (16 - 19)  SpO2: 98% (10 Mar 2020 23:56) (98% - 100%)    Physical Exam:    General:  Appears stated age, NAD, patient shaking mildly   Eyes : ANDREA  HENT:  WNL, no JVD  Chest:  clear breath sounds  Cardiovascular:  Regular rate & rhythm  Abdomen:  soft, non-distended, mild tenderness to palpation epigastric region, -Gibson's, -McBurney's, -Rovsing's, -Psoas   Skin:  No rash  Musculoskeletal:  normal strength  Neuro/Psych:  Alert & oriented x 3       LABS:                        15.0   5.15  )-----------( 298      ( 10 Mar 2020 22:09 )             46.7     03-10    143  |  107  |  8   ----------------------------<  98  3.6   |  21<L>  |  0.88    Ca    9.0      10 Mar 2020 22:09  Mg     2.3     03-10    TPro  8.4<H>  /  Alb  3.9  /  TBili  0.3  /  DBili  x   /  AST  42<H>  /  ALT  55  /  AlkPhos  50  03-10          RADIOLOGY & ADDITIONAL STUDIES:  < from: CT Abdomen and Pelvis w/ Oral Cont (03.11.20 @ 01:00) >  EXAM:  CT ABDOMEN AND PELVIS OC                            PROCEDURE DATE:  03/11/2020          INTERPRETATION:  CLINICAL INFORMATION: Epigastric pain.    PROCEDURE:   Helical axial images were obtained from the domes of the diaphragm through the pubic symphysis without intravenous or oral contrast. Coronal and sagittal reformats were also obtained. Patient could not drink p.o. contrast.    COMPARISON: 1/7/2020.    FINDINGS: Evaluation of the abdominal/pelvic organs, viscera and vasculature is limited without intravenous contrast.     LOWER CHEST: Subsegmental atelectasis.    LIVER: Low-attenuation indicating fatty infiltration, with sparing near the gallbladder fossa.  GALLBLADDER/BILE DUCTS: No intrahepatic or extrahepatic biliary dilatation. No radiopaque gallstone.  PANCREAS: Unremarkable.  SPLEEN: Unremarkable.    ADRENALS: Unremarkable.  KIDNEYS/URETERS: No hydronephrosis, hydroureter or significant perinephric stranding. No radiopaque urinary tract stone.   BLADDER: Partially distended.  REPRODUCTIVE ORGANS: Unremarkable.    BOWEL: Prominent wall of the visualized, fluid-filled distal esophagus. No bowel obstruction. Borderline proximal/mid appendix tapering distally without inflammatory change. Partially distended stomach, limiting evaluation. Diverticulosis of the cecum/ascending colon.  PERITONEUM: No drainable fluid collection or free air.  VESSELS: Atherosclerosis. Normal caliber of the abdominal aorta.   RETROPERITONEUM: No lymphadenopathy.    ABDOMINAL WALL/SOFT TISSUES: Unremarkable.  BONES: Degenerative changes of the spine. Stable mild anterior wedging of the lower thoracic spine.    IMPRESSION:     Prominent wall of the visualized distal esophagus, which may be due to partial distention and/or esophagitis. Partially distended stomach, limiting evaluation. Recommend clinical correlation and follow-up upper endoscopy as clinically indicated.    Borderline appendix without surrounding inflammatory change, equivocal for appendicitis. Recommend clinical correlation for further evaluation.    Additional findings as described.            DENISE ISLAS M.D., ATTENDING RADIOLOGIST  This document has been electronically signed. Mar 11 2020  2:14AM    < end of copied text >

## 2020-03-11 NOTE — PROCEDURE NOTE - ADDITIONAL PROCEDURE DETAILS
Patient required midline for IV access. Patient seen at bedside for midline catheter placement. Upper extremity prepped and draped in usual sterile fashion. [employer] Endurance extended dwelling 20gauge by 8cm midline catheter placed using ultrasound guided seldinger technique, R brachial vein. Flushes well, with good venous return. Patient tolerated procedure well without complication and was left in no acute distress.   Lot#: [id number]

## 2020-03-11 NOTE — PROGRESS NOTE ADULT - SUBJECTIVE AND OBJECTIVE BOX
CHIEF COMPLAINT:      PHYSICAL EXAM:    GENERAL: well built, well nourished  CHEST/LUNG: Clear to ausculation bilaterally, no wheezing, no crackles   HEART: Regular rate and rhythm; No murmurs, rubs  ABDOMEN: Soft, Nontender, Nondistended; Bowel sounds present  EXTREMITIES:  Moving all four extremities spontaneously, No clubbing, cyanosis, or edema  NERVOUS SYSTEM:  Grossly non focal.  Psychiatry: AAO x 3, mood is appropriate       OBJECTIVE DATA:   Vital Signs Last 24 Hrs  T(C): 37.1 (11 Mar 2020 11:45), Max: 37.3 (10 Mar 2020 23:56)  T(F): 98.8 (11 Mar 2020 11:45), Max: 99.2 (10 Mar 2020 23:56)  HR: 84 (11 Mar 2020 11:45) (77 - 109)  BP: 130/84 (11 Mar 2020 11:45) (115/88 - 143/81)  BP(mean): --  RR: 18 (11 Mar 2020 11:45) (16 - 19)  SpO2: 98% (11 Mar 2020 11:45) (95% - 100%)           Daily Height in cm: 170.18 (11 Mar 2020 06:12)    Daily   LABS:                        11.7   4.33  )-----------( 250      ( 11 Mar 2020 10:32 )             36.5             03-11    142  |  109<H>  |  5<L>  ----------------------------<  82  3.6   |  24  |  0.73    Ca    8.0<L>      11 Mar 2020 10:32  Mg     2.3     03-10    TPro  6.3  /  Alb  3.0<L>  /  TBili  0.3  /  DBili  x   /  AST  36  /  ALT  43  /  AlkPhos  38<L>  03-11                    CARDIAC MARKERS ( 10 Mar 2020 22:09 )  <.015 ng/mL / x     / x     / x     / x          CAPILLARY BLOOD GLUCOSE             Interval Radiology studies: reviewed by me    MEDICATIONS  (STANDING):  atorvastatin 20 milliGRAM(s) Oral at bedtime  folic acid 1 milliGRAM(s) Oral daily  heparin  Injectable 5000 Unit(s) SubCutaneous every 12 hours  metoprolol tartrate 25 milliGRAM(s) Oral every 12 hours  multivitamin 1 Tablet(s) Oral daily  pantoprazole Infusion 8 mG/Hr (10 mL/Hr) IV Continuous <Continuous>  PHENobarbital Injectable 200 milliGRAM(s) IntraMuscular once  sucralfate suspension 1 Gram(s) Oral four times a day  thiamine 100 milliGRAM(s) Oral daily    MEDICATIONS  (PRN):  LORazepam   Injectable 2 milliGRAM(s) IV Push every 4 hours PRN alcohol withdrawal CIWA score > 10  ondansetron Injectable 4 milliGRAM(s) IV Push every 6 hours PRN Nausea and/or Vomiting CHIEF COMPLAINT: + abd pain   + stomach pain  no visible shakiness  no fever no vomiting no diarrhea       PHYSICAL EXAM:    GENERAL: well built, well nourished  CHEST/LUNG: Clear to ausculation bilaterally, no wheezing, no crackles   HEART: Regular rate and rhythm; No murmurs, rubs  ABDOMEN: Soft, Nontender, Nondistended; Bowel sounds present  EXTREMITIES:  Moving all four extremities spontaneously, No clubbing, cyanosis, or edema  NERVOUS SYSTEM:  Grossly non focal.  Psychiatry: AAO x 2-3, mood is appropriate       OBJECTIVE DATA:   Vital Signs Last 24 Hrs  T(C): 37.1 (11 Mar 2020 11:45), Max: 37.3 (10 Mar 2020 23:56)  T(F): 98.8 (11 Mar 2020 11:45), Max: 99.2 (10 Mar 2020 23:56)  HR: 84 (11 Mar 2020 11:45) (77 - 109)  BP: 130/84 (11 Mar 2020 11:45) (115/88 - 143/81)  BP(mean): --  RR: 18 (11 Mar 2020 11:45) (16 - 19)  SpO2: 98% (11 Mar 2020 11:45) (95% - 100%)           Daily Height in cm: 170.18 (11 Mar 2020 06:12)    Daily   LABS:                        11.7   4.33  )-----------( 250      ( 11 Mar 2020 10:32 )             36.5             03-11    142  |  109<H>  |  5<L>  ----------------------------<  82  3.6   |  24  |  0.73    Ca    8.0<L>      11 Mar 2020 10:32  Mg     2.3     03-10    TPro  6.3  /  Alb  3.0<L>  /  TBili  0.3  /  DBili  x   /  AST  36  /  ALT  43  /  AlkPhos  38<L>  03-11                    CARDIAC MARKERS ( 10 Mar 2020 22:09 )  <.015 ng/mL / x     / x     / x     / x             Interval Radiology studies: reviewed by me    < from: CT Abdomen and Pelvis w/ Oral Cont (03.11.20 @ 01:00) >  Prominent wall of the visualized distal esophagus, which may be due to partial distention and/or esophagitis. Partially distended stomach, limiting evaluation. Recommend clinical correlation and follow-up upper endoscopy as clinically indicated.    Borderline appendix without surrounding inflammatory change, equivocal for appendicitis. Recommend clinical correlation for further evaluation.      MEDICATIONS  (STANDING):  atorvastatin 20 milliGRAM(s) Oral at bedtime  folic acid 1 milliGRAM(s) Oral daily  heparin  Injectable 5000 Unit(s) SubCutaneous every 12 hours  metoprolol tartrate 25 milliGRAM(s) Oral every 12 hours  multivitamin 1 Tablet(s) Oral daily  pantoprazole Infusion 8 mG/Hr (10 mL/Hr) IV Continuous <Continuous>  PHENobarbital Injectable 200 milliGRAM(s) IntraMuscular once  sucralfate suspension 1 Gram(s) Oral four times a day  thiamine 100 milliGRAM(s) Oral daily    MEDICATIONS  (PRN):  LORazepam   Injectable 2 milliGRAM(s) IV Push every 4 hours PRN alcohol withdrawal CIWA score > 10  ondansetron Injectable 4 milliGRAM(s) IV Push every 6 hours PRN Nausea and/or Vomiting

## 2020-03-12 ENCOUNTER — TRANSCRIPTION ENCOUNTER (OUTPATIENT)
Age: 55
End: 2020-03-12

## 2020-03-12 VITALS
RESPIRATION RATE: 17 BRPM | DIASTOLIC BLOOD PRESSURE: 90 MMHG | HEART RATE: 78 BPM | SYSTOLIC BLOOD PRESSURE: 134 MMHG | TEMPERATURE: 98 F

## 2020-03-12 DIAGNOSIS — E78.2 MIXED HYPERLIPIDEMIA: ICD-10-CM

## 2020-03-12 DIAGNOSIS — E83.39 OTHER DISORDERS OF PHOSPHORUS METABOLISM: ICD-10-CM

## 2020-03-12 DIAGNOSIS — K29.21 ALCOHOLIC GASTRITIS WITH BLEEDING: ICD-10-CM

## 2020-03-12 DIAGNOSIS — N17.9 ACUTE KIDNEY FAILURE, UNSPECIFIED: ICD-10-CM

## 2020-03-12 DIAGNOSIS — F10.10 ALCOHOL ABUSE, UNCOMPLICATED: ICD-10-CM
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
## 2020-03-12 DIAGNOSIS — E87.6 HYPOKALEMIA: ICD-10-CM

## 2020-03-12 DIAGNOSIS — F10.230 ALCOHOL DEPENDENCE WITH WITHDRAWAL, UNCOMPLICATED: ICD-10-CM

## 2020-03-12 PROCEDURE — 99239 HOSP IP/OBS DSCHRG MGMT >30: CPT

## 2020-03-12 RX ORDER — SUCRALFATE 1 G
10 TABLET ORAL
Qty: 1200 | Refills: 0
Start: 2020-03-12 | End: 2020-04-10

## 2020-03-12 RX ORDER — PANTOPRAZOLE SODIUM 20 MG/1
1 TABLET, DELAYED RELEASE ORAL
Qty: 30 | Refills: 0
Start: 2020-03-12 | End: 2020-04-10

## 2020-03-12 RX ADMIN — Medication 100 MILLIGRAM(S): at 11:17

## 2020-03-12 RX ADMIN — Medication 25 MILLIGRAM(S): at 05:36

## 2020-03-12 RX ADMIN — Medication 1 GRAM(S): at 11:17

## 2020-03-12 RX ADMIN — Medication 25 MILLIGRAM(S): at 17:45

## 2020-03-12 RX ADMIN — Medication 1 MILLIGRAM(S): at 11:17

## 2020-03-12 RX ADMIN — Medication 1 GRAM(S): at 05:36

## 2020-03-12 RX ADMIN — HEPARIN SODIUM 5000 UNIT(S): 5000 INJECTION INTRAVENOUS; SUBCUTANEOUS at 05:36

## 2020-03-12 RX ADMIN — Medication 1 TABLET(S): at 11:17

## 2020-03-12 RX ADMIN — Medication 2 MILLIGRAM(S): at 06:42

## 2020-03-12 RX ADMIN — Medication 1 GRAM(S): at 00:08

## 2020-03-12 RX ADMIN — Medication 1 GRAM(S): at 17:45

## 2020-03-12 RX ADMIN — PANTOPRAZOLE SODIUM 10 MG/HR: 20 TABLET, DELAYED RELEASE ORAL at 02:51

## 2020-03-12 NOTE — DISCHARGE NOTE PROVIDER - HOSPITAL COURSE
54M PMH chronic ETOH abuse/dependence with hx of alcohol withdrawal with frequent hospital admissions, GERD, HLD, alcoholic gastritis/esophagitis, PUD, hx of hypoxic respiratory failure requiring intubation due to aspiration PNA, staph PNA presented for abdominal pain and vomiting.  Patient mildly confused, full history unable to be taken at admission.  Patient reported abdominal pain, nausea, and vomiting resulting in poor PO intake.  Patient reported his last drink was 2-3 days PTA (blood alcohol 270).  Labs reveal significant lactic acidosis improved with fluids.  CT abdomen nonspecific appendicitis and CT chest showed esophagitis. Patient was given iv ativan for acute alcohol withdrawal. GI and surgery were consulted. PPI infusion was initiated. repeat CT abd with oral and iv contrast did not show any acute appendicitis. surgery service ruled out acute appendicitis and recommended no surgery. Patient's alcohol withdrawal symptoms under control.     recommended no alcohol and medication compliance with PPI and sucralfate to avoid complications (such as PUD, perforation, bleeding, garcia's esophagus). Outpatient follow up with GI is also recommended strongly. patient verbalized understanding.                  Acute alcoholic gastritis/esophagitis without hemorrhage. PPI and sucralfate.              Alcohol withdrawal syndrome without complication. Under control.         R/O Appendicitis, unspecified appendicitis type. Ruled out.          Dyslipidemia.  Plan: lipitor.             Essential hypertension.  Plan: metoprolol.         PHYSICAL EXAM:        GENERAL: well built, well nourished    CHEST/LUNG: Clear to ausculation bilaterally, no wheezing, no crackles     HEART: Regular rate and rhythm; No murmurs, rubs    ABDOMEN: Soft, Nontender, Nondistended; Bowel sounds present    EXTREMITIES:  Moving all four extremities spontaneously, No clubbing, cyanosis, or edema    NERVOUS SYSTEM:  Grossly non focal.    Psychiatry: AAO x 3, mood is appropriate             OBJECTIVE DATA:         Vital Signs Last 24 Hrs    T(C): 36.7 (12 Mar 2020 11:14), Max: 37.1 (11 Mar 2020 11:45)    T(F): 98 (12 Mar 2020 11:14), Max: 98.8 (11 Mar 2020 11:45)    HR: 78 (12 Mar 2020 11:14) (66 - 84)    BP: 134/90 (12 Mar 2020 11:14) (113/77 - 134/90)    BP(mean): --    RR: 17 (12 Mar 2020 11:14) (17 - 18)    SpO2: 98% (12 Mar 2020 05:33) (97% - 98%)           Daily       Daily Weight in k.4 (12 Mar 2020 05:33)    LABS:                            11.7     4.33  )-----------( 250      ( 11 Mar 2020 10:32 )               36.5               03-11        142  |  109<H>  |  5<L>    ----------------------------<  82    3.6   |  24  |  0.73        Ca    8.0<L>      11 Mar 2020 10:32    Mg     2.3     03-10        TPro  6.3  /  Alb  3.0<L>  /  TBili  0.3  /  DBili  x   /  AST  36  /  ALT  43  /  AlkPhos  38<L>  03-11                                  DC TIME SPENT BY ME EXCLUDING BILLABLE PROCEDURES 38 mins

## 2020-03-12 NOTE — DISCHARGE NOTE NURSING/CASE MANAGEMENT/SOCIAL WORK - PATIENT PORTAL LINK FT
You can access the FollowMyHealth Patient Portal offered by [facility] by registering at the following website: [email or web address]. By joining [employer]’s FollowMyHealth portal, you will also be able to view your health information using other applications (apps) compatible with our system.

## 2020-03-12 NOTE — PROGRESS NOTE ADULT - SUBJECTIVE AND OBJECTIVE BOX
Surgery NP note  Patient seen and examined bedside resting comfortably.  C/O Epigastric pain  Denies nausea and vomiting. Tolerating diet.  Normal flatus/BM.     T(F): 97.9 (03-12-20 @ 05:33), Max: 98.8 (03-11-20 @ 11:45)  HR: 71 (03-12-20 @ 05:33) (66 - 84)  BP: 113/77 (03-12-20 @ 05:33) (113/77 - 130/84)  RR: 18 (03-12-20 @ 05:33) (17 - 18)  SpO2: 98% (03-12-20 @ 05:33) (97% - 98%)  Wt(kg): --  CAPILLARY BLOOD GLUCOSE          PHYSICAL EXAM:  General: NAD, WDWN.   Neuro:  Alert & responsive  HEENT: NCAT, EOMI, conjunctiva clear  CV: +S1+S2 regular rate and rhythm  Lung: clear to ausculation bilaterally, respirations nonlabored, good inspiratory effort  Abdomen: soft, +Epigastric tenderness, No Distension. Normal active BS  Extremities: no pedal edema or calf tenderness noted     LABS:                        11.7   4.33  )-----------( 250      ( 11 Mar 2020 10:32 )             36.5     03-11    142  |  109<H>  |  5<L>  ----------------------------<  82  3.6   |  24  |  0.73    Ca    8.0<L>      11 Mar 2020 10:32  Mg     2.3     03-10    TPro  6.3  /  Alb  3.0<L>  /  TBili  0.3  /  DBili  x   /  AST  36  /  ALT  43  /  AlkPhos  38<L>  03-11    LIVER FUNCTIONS - ( 11 Mar 2020 10:32 )  Alb: 3.0 g/dL / Pro: 6.3 gm/dL / ALK PHOS: 38 U/L / ALT: 43 U/L / AST: 36 U/L / GGT: x             I&O's Detail    11 Mar 2020 07:01  -  12 Mar 2020 07:00  --------------------------------------------------------  IN:    pantoprazole Infusion: 120 mL  Total IN: 120 mL    OUT:    Voided: 450 mL  Total OUT: 450 mL    Total NET: -330 mL        Impression: 55 y/o male with PMH alcohol abuse, gastritis, PUD, HLD presenting with abdominal pain, N/V. +alc tox.  CT (without PO or IV contrast) showing esophageal distention vs esophagitis, stomach distention; borderline appendix without surrounding inflammatory changes, equivocal for appendicitis.  Afebrile, no leukocytosis. +epigastric tenderness, -RLQ tenderness.     Plan  -Follow CBC, Continue with GI reccs, protonix/Carafate  -Serial abdominal exams.    -F/u am labs; trend lactate.    -IVF resuscitation.     -Medical management per medicine.    -Discussed with Dr. Quan.

## 2020-03-12 NOTE — PROGRESS NOTE ADULT - SUBJECTIVE AND OBJECTIVE BOX
Pt tolerating diet  mild c/o mid epig pain  on protonix and carafate    MEDICATIONS  (STANDING):  atorvastatin 20 milliGRAM(s) Oral at bedtime  folic acid 1 milliGRAM(s) Oral daily  heparin  Injectable 5000 Unit(s) SubCutaneous every 12 hours  metoprolol tartrate 25 milliGRAM(s) Oral every 12 hours  multivitamin 1 Tablet(s) Oral daily  pantoprazole Infusion 8 mG/Hr (10 mL/Hr) IV Continuous <Continuous>  sucralfate suspension 1 Gram(s) Oral four times a day  thiamine 100 milliGRAM(s) Oral daily    MEDICATIONS  (PRN):  LORazepam   Injectable 2 milliGRAM(s) IV Push every 4 hours PRN Alcohol withdrawal CIWA score >10  ondansetron Injectable 4 milliGRAM(s) IV Push every 6 hours PRN Nausea and/or Vomiting      Allergies    No Known Allergies    Intolerances        Vital Signs Last 24 Hrs  T(C): 36.7 (12 Mar 2020 11:14), Max: 36.8 (12 Mar 2020 00:40)  T(F): 98 (12 Mar 2020 11:14), Max: 98.2 (12 Mar 2020 00:40)  HR: 78 (12 Mar 2020 11:14) (66 - 78)  BP: 134/90 (12 Mar 2020 11:14) (113/77 - 134/90)  BP(mean): --  RR: 17 (12 Mar 2020 11:14) (17 - 18)  SpO2: 98% (12 Mar 2020 05:33) (97% - 98%)    PHYSICAL EXAM:  General: NAD.  CVS: S1, S2  Chest: air entry bilaterally present  Abd: BS present, soft, non-tender      LABS:                        11.7   4.33  )-----------( 250      ( 11 Mar 2020 10:32 )             36.5     03-11    142  |  109<H>  |  5<L>  ----------------------------<  82  3.6   |  24  |  0.73    Ca    8.0<L>      11 Mar 2020 10:32  Mg     2.3     03-10    TPro  6.3  /  Alb  3.0<L>  /  TBili  0.3  /  DBili  x   /  AST  36  /  ALT  43  /  AlkPhos  38<L>  03-11      agree with d/c plan  continue protonix and carafate  Pt counselled to d/c alcohol

## 2020-03-12 NOTE — DISCHARGE NOTE PROVIDER - PROVIDER TOKENS
FREE:[LAST:[Follow up with PCP in 1 week],PHONE:[(   )    -],FAX:[(   )    -]],FREE:[LAST:[gastroenteritis Dr Finn in 1-2 weeks],PHONE:[(   )    -],FAX:[(   )    -]]

## 2020-03-12 NOTE — DISCHARGE NOTE PROVIDER - CARE PROVIDER_API CALL
Follow up with PCP in 1 week,   Phone: (   )    -  Fax: (   )    -  Follow Up Time:     adonis Finn in 1-2 weeks,   Phone: (   )    -  Fax: (   )    -  Follow Up Time:

## 2020-03-17 DIAGNOSIS — K21.9 GASTRO-ESOPHAGEAL REFLUX DISEASE WITHOUT ESOPHAGITIS: ICD-10-CM

## 2020-03-17 DIAGNOSIS — F10.230 ALCOHOL DEPENDENCE WITH WITHDRAWAL, UNCOMPLICATED: ICD-10-CM

## 2020-03-17 DIAGNOSIS — R10.9 UNSPECIFIED ABDOMINAL PAIN: ICD-10-CM

## 2020-03-17 DIAGNOSIS — E78.5 HYPERLIPIDEMIA, UNSPECIFIED: ICD-10-CM

## 2020-03-17 DIAGNOSIS — K20.8 OTHER ESOPHAGITIS: ICD-10-CM

## 2020-03-17 DIAGNOSIS — I10 ESSENTIAL (PRIMARY) HYPERTENSION: ICD-10-CM

## 2020-03-17 DIAGNOSIS — K29.20 ALCOHOLIC GASTRITIS WITHOUT BLEEDING: ICD-10-CM

## 2020-03-17 DIAGNOSIS — E87.2 ACIDOSIS: ICD-10-CM
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
## 2020-03-18 NOTE — H&P ADULT - NSICDXPASTSURGICALHX_GEN_ALL_CORE_FT
none
PAST SURGICAL HISTORY:  No significant past surgical history
PAST SURGICAL HISTORY:  No significant past surgical history

## 2020-03-19 ENCOUNTER — EMERGENCY (EMERGENCY)
Facility: HOSPITAL | Age: 55
LOS: 0 days | Discharge: HOME | End: 2020-03-19
Attending: EMERGENCY MEDICINE
Payer: MEDICAID

## 2020-03-19 VITALS — DIASTOLIC BLOOD PRESSURE: 84 MMHG | HEART RATE: 95 BPM | SYSTOLIC BLOOD PRESSURE: 138 MMHG

## 2020-03-19 VITALS
SYSTOLIC BLOOD PRESSURE: 94 MMHG | DIASTOLIC BLOOD PRESSURE: 76 MMHG | WEIGHT: 164.91 LBS | OXYGEN SATURATION: 97 % | HEART RATE: 92 BPM | TEMPERATURE: 98 F | HEIGHT: 67 IN

## 2020-03-19 DIAGNOSIS — K29.20 ALCOHOLIC GASTRITIS WITHOUT BLEEDING: ICD-10-CM

## 2020-03-19 DIAGNOSIS — F10.129 ALCOHOL ABUSE WITH INTOXICATION, UNSPECIFIED: ICD-10-CM

## 2020-03-19 DIAGNOSIS — R10.13 EPIGASTRIC PAIN: ICD-10-CM

## 2020-03-19 DIAGNOSIS — R11.10 VOMITING, UNSPECIFIED: ICD-10-CM

## 2020-03-19 LAB
ALBUMIN SERPL ELPH-MCNC: 3.6 G/DL — SIGNIFICANT CHANGE UP (ref 3.3–5)
ALP SERPL-CCNC: 53 U/L — SIGNIFICANT CHANGE UP (ref 40–120)
ALT FLD-CCNC: 36 U/L — SIGNIFICANT CHANGE UP (ref 12–78)
ANION GAP SERPL CALC-SCNC: 11 MMOL/L — SIGNIFICANT CHANGE UP (ref 5–17)
AST SERPL-CCNC: 33 U/L — SIGNIFICANT CHANGE UP (ref 15–37)
BASOPHILS # BLD AUTO: 0.03 K/UL — SIGNIFICANT CHANGE UP (ref 0–0.2)
BASOPHILS NFR BLD AUTO: 0.6 % — SIGNIFICANT CHANGE UP (ref 0–2)
BILIRUB SERPL-MCNC: 0.2 MG/DL — SIGNIFICANT CHANGE UP (ref 0.2–1.2)
BUN SERPL-MCNC: 14 MG/DL — SIGNIFICANT CHANGE UP (ref 7–23)
CALCIUM SERPL-MCNC: 8.6 MG/DL — SIGNIFICANT CHANGE UP (ref 8.5–10.1)
CHLORIDE SERPL-SCNC: 107 MMOL/L — SIGNIFICANT CHANGE UP (ref 96–108)
CO2 SERPL-SCNC: 24 MMOL/L — SIGNIFICANT CHANGE UP (ref 22–31)
CREAT SERPL-MCNC: 0.86 MG/DL — SIGNIFICANT CHANGE UP (ref 0.5–1.3)
EOSINOPHIL # BLD AUTO: 0.06 K/UL — SIGNIFICANT CHANGE UP (ref 0–0.5)
EOSINOPHIL NFR BLD AUTO: 1.1 % — SIGNIFICANT CHANGE UP (ref 0–6)
ETHANOL SERPL-MCNC: 328 MG/DL — SIGNIFICANT CHANGE UP (ref 0–10)
GLUCOSE SERPL-MCNC: 127 MG/DL — HIGH (ref 70–99)
HCT VFR BLD CALC: 43.8 % — SIGNIFICANT CHANGE UP (ref 39–50)
HGB BLD-MCNC: 14.4 G/DL — SIGNIFICANT CHANGE UP (ref 13–17)
IMM GRANULOCYTES NFR BLD AUTO: 0.2 % — SIGNIFICANT CHANGE UP (ref 0–1.5)
LACTATE SERPL-SCNC: 3.3 MMOL/L — HIGH (ref 0.7–2)
LACTATE SERPL-SCNC: 3.6 MMOL/L — HIGH (ref 0.7–2)
LACTATE SERPL-SCNC: 3.8 MMOL/L — HIGH (ref 0.7–2)
LIDOCAIN IGE QN: 346 U/L — SIGNIFICANT CHANGE UP (ref 73–393)
LYMPHOCYTES # BLD AUTO: 2.04 K/UL — SIGNIFICANT CHANGE UP (ref 1–3.3)
LYMPHOCYTES # BLD AUTO: 37.6 % — SIGNIFICANT CHANGE UP (ref 13–44)
MCHC RBC-ENTMCNC: 27.7 PG — SIGNIFICANT CHANGE UP (ref 27–34)
MCHC RBC-ENTMCNC: 32.9 GM/DL — SIGNIFICANT CHANGE UP (ref 32–36)
MCV RBC AUTO: 84.2 FL — SIGNIFICANT CHANGE UP (ref 80–100)
MONOCYTES # BLD AUTO: 0.26 K/UL — SIGNIFICANT CHANGE UP (ref 0–0.9)
MONOCYTES NFR BLD AUTO: 4.8 % — SIGNIFICANT CHANGE UP (ref 2–14)
NEUTROPHILS # BLD AUTO: 3.03 K/UL — SIGNIFICANT CHANGE UP (ref 1.8–7.4)
NEUTROPHILS NFR BLD AUTO: 55.7 % — SIGNIFICANT CHANGE UP (ref 43–77)
NRBC # BLD: 0 /100 WBCS — SIGNIFICANT CHANGE UP (ref 0–0)
PLATELET # BLD AUTO: 272 K/UL — SIGNIFICANT CHANGE UP (ref 150–400)
POTASSIUM SERPL-MCNC: 4.1 MMOL/L — SIGNIFICANT CHANGE UP (ref 3.5–5.3)
POTASSIUM SERPL-SCNC: 4.1 MMOL/L — SIGNIFICANT CHANGE UP (ref 3.5–5.3)
PROT SERPL-MCNC: 7.6 GM/DL — SIGNIFICANT CHANGE UP (ref 6–8.3)
RBC # BLD: 5.2 M/UL — SIGNIFICANT CHANGE UP (ref 4.2–5.8)
RBC # FLD: 15.5 % — HIGH (ref 10.3–14.5)
SODIUM SERPL-SCNC: 142 MMOL/L — SIGNIFICANT CHANGE UP (ref 135–145)
WBC # BLD: 5.43 K/UL — SIGNIFICANT CHANGE UP (ref 3.8–10.5)
WBC # FLD AUTO: 5.43 K/UL — SIGNIFICANT CHANGE UP (ref 3.8–10.5)

## 2020-03-19 PROCEDURE — 99285 EMERGENCY DEPT VISIT HI MDM: CPT

## 2020-03-19 RX ORDER — SODIUM CHLORIDE 9 MG/ML
1000 INJECTION INTRAMUSCULAR; INTRAVENOUS; SUBCUTANEOUS ONCE
Refills: 0 | Status: COMPLETED | OUTPATIENT
Start: 2020-03-19 | End: 2020-03-19

## 2020-03-19 RX ORDER — PANTOPRAZOLE SODIUM 20 MG/1
40 TABLET, DELAYED RELEASE ORAL ONCE
Refills: 0 | Status: COMPLETED | OUTPATIENT
Start: 2020-03-19 | End: 2020-03-19

## 2020-03-19 RX ORDER — MORPHINE SULFATE 50 MG/1
2 CAPSULE, EXTENDED RELEASE ORAL ONCE
Refills: 0 | Status: DISCONTINUED | OUTPATIENT
Start: 2020-03-19 | End: 2020-03-19

## 2020-03-19 RX ORDER — SUCRALFATE 1 G
1 TABLET ORAL ONCE
Refills: 0 | Status: COMPLETED | OUTPATIENT
Start: 2020-03-19 | End: 2020-03-19

## 2020-03-19 RX ORDER — SODIUM CHLORIDE 9 MG/ML
2000 INJECTION INTRAMUSCULAR; INTRAVENOUS; SUBCUTANEOUS ONCE
Refills: 0 | Status: COMPLETED | OUTPATIENT
Start: 2020-03-19 | End: 2020-03-19

## 2020-03-19 RX ADMIN — PANTOPRAZOLE SODIUM 40 MILLIGRAM(S): 20 TABLET, DELAYED RELEASE ORAL at 02:29

## 2020-03-19 RX ADMIN — Medication 10 MILLILITER(S): at 03:54

## 2020-03-19 RX ADMIN — SODIUM CHLORIDE 1000 MILLILITER(S): 9 INJECTION INTRAMUSCULAR; INTRAVENOUS; SUBCUTANEOUS at 05:54

## 2020-03-19 RX ADMIN — MORPHINE SULFATE 2 MILLIGRAM(S): 50 CAPSULE, EXTENDED RELEASE ORAL at 03:54

## 2020-03-19 RX ADMIN — Medication 1 GRAM(S): at 02:30

## 2020-03-19 RX ADMIN — SODIUM CHLORIDE 1000 MILLILITER(S): 9 INJECTION INTRAMUSCULAR; INTRAVENOUS; SUBCUTANEOUS at 08:21

## 2020-03-19 RX ADMIN — SODIUM CHLORIDE 2000 MILLILITER(S): 9 INJECTION INTRAMUSCULAR; INTRAVENOUS; SUBCUTANEOUS at 02:29

## 2020-03-19 RX ADMIN — Medication 50 MILLIGRAM(S): at 09:24

## 2020-03-19 RX ADMIN — MORPHINE SULFATE 2 MILLIGRAM(S): 50 CAPSULE, EXTENDED RELEASE ORAL at 02:30

## 2020-03-19 RX ADMIN — Medication 30 MILLILITER(S): at 07:24

## 2020-03-19 RX ADMIN — MORPHINE SULFATE 2 MILLIGRAM(S): 50 CAPSULE, EXTENDED RELEASE ORAL at 05:50

## 2020-03-19 RX ADMIN — Medication 2 MILLIGRAM(S): at 07:24

## 2020-03-19 NOTE — ED PROVIDER NOTE - CLINICAL SUMMARY MEDICAL DECISION MAKING FREE TEXT BOX
Pt w exacerbation of alcoholic gastritis.  Improving in ED w meds given.  Pending repeat lactic acid.  Patient care transitioned to incoming team.  All decisions regarding the progression of care will be made at their discretion.

## 2020-03-19 NOTE — ED PROVIDER NOTE - OBJECTIVE STATEMENT
Pertinent PMH/PSH/FHx/SHx and Review of Systems contained within:    53yo M w PMH of EtOH abuse, gastritis/esophagitis presents to ED c/o abd pain.  Pt states he is having epigastric pain similar to his previous episodes of gastritis.  +vomiting.  Denies diarrhea.    No fever/chills, No photophobia/eye pain/changes in vision, No ear pain/sore throat/dysphagia, No chest pain/palpitations, no SOB/cough/wheeze/stridor, +abdominal pain, No neck/back pain, no rash, no changes in neurological status/function.

## 2020-03-19 NOTE — ED PROVIDER NOTE - PATIENT PORTAL LINK FT
You can access the FollowMyHealth Patient Portal offered by [facility] by registering at the following website: [email or web address]. By joining [employer]’s FollowMyHealth portal, you will also be able to view your health information using other applications (apps) compatible with our system.

## 2020-03-19 NOTE — ED ADULT TRIAGE NOTE - CHIEF COMPLAINT QUOTE
PW with ETOH withdrawal symptoms states last drink 3 days ago. Reports BUE tremors, ABD pain , N/V. pt denies chest pain, SOB, headache.

## 2020-03-19 NOTE — ED PROVIDER NOTE - PHYSICAL EXAMINATION
Gen: Alert, c/o pain  Head: NC, AT, PERRL, EOMI, normal lids/conjunctiva  ENT: normal hearing, patent oropharynx, MMM  Neck: supple, no tenderness/meningismus/JVD, Trachea midline  Pulm: Bilateral clear BS, normal resp effort, no wheeze/stridor/retractions  CV: RRR, no M/R/G, +dist pulses  Abd: soft, +epigastric TTP, ND, +BS, no guarding/rebound tenderness  Mskel: no edema/erythema/cyanosis  Skin: no rash  Neuro: no sensory/motor deficits

## 2020-03-19 NOTE — ED ADULT NURSE NOTE - NSIMPLEMENTINTERV_GEN_ALL_ED
Implemented All Universal Safety Interventions:  [place] to call system. Call bell, personal items and telephone within reach. Instruct patient to call for assistance. Room bathroom lighting operational. Non-slip footwear when patient is off stretcher. Physically safe environment: no spills, clutter or unnecessary equipment. Stretcher in lowest position, wheels locked, appropriate side rails in place.

## 2020-03-19 NOTE — ED ADULT NURSE REASSESSMENT NOTE - NS ED NURSE REASSESS COMMENT FT1
0715 pt aggitated c/o abd pain + tremors assisted back to bed and meds given via left foot no redness or swelling at site. 0750 Pt states feels better.
pt called son for , pt to wait in waiting room, pt dressed self ambulatory to bathroom whitenessed by this nurse and md
Pt c/o abd pain and tremors See ciwa

## 2020-03-21 ENCOUNTER — EMERGENCY (EMERGENCY)
Facility: HOSPITAL | Age: 55
LOS: 0 days | Discharge: HOME | End: 2020-03-21
Attending: EMERGENCY MEDICINE
Payer: MEDICAID

## 2020-03-21 VITALS
TEMPERATURE: 98 F | RESPIRATION RATE: 18 BRPM | DIASTOLIC BLOOD PRESSURE: 73 MMHG | HEART RATE: 94 BPM | SYSTOLIC BLOOD PRESSURE: 116 MMHG | OXYGEN SATURATION: 97 %

## 2020-03-21 VITALS
HEIGHT: 67 IN | TEMPERATURE: 98 F | WEIGHT: 169.98 LBS | RESPIRATION RATE: 17 BRPM | OXYGEN SATURATION: 100 % | HEART RATE: 110 BPM | SYSTOLIC BLOOD PRESSURE: 130 MMHG | DIASTOLIC BLOOD PRESSURE: 95 MMHG

## 2020-03-21 DIAGNOSIS — F10.129 ALCOHOL ABUSE WITH INTOXICATION, UNSPECIFIED: ICD-10-CM

## 2020-03-21 DIAGNOSIS — K29.70 GASTRITIS, UNSPECIFIED, WITHOUT BLEEDING: ICD-10-CM

## 2020-03-21 DIAGNOSIS — F10.10 ALCOHOL ABUSE, UNCOMPLICATED: ICD-10-CM

## 2020-03-21 DIAGNOSIS — G89.29 OTHER CHRONIC PAIN: ICD-10-CM

## 2020-03-21 DIAGNOSIS — R10.9 UNSPECIFIED ABDOMINAL PAIN: ICD-10-CM

## 2020-03-21 LAB
ALBUMIN SERPL ELPH-MCNC: 3.6 G/DL — SIGNIFICANT CHANGE UP (ref 3.3–5)
ALP SERPL-CCNC: 50 U/L — SIGNIFICANT CHANGE UP (ref 40–120)
ALT FLD-CCNC: 33 U/L — SIGNIFICANT CHANGE UP (ref 12–78)
ANION GAP SERPL CALC-SCNC: 11 MMOL/L — SIGNIFICANT CHANGE UP (ref 5–17)
AST SERPL-CCNC: 41 U/L — HIGH (ref 15–37)
BASOPHILS # BLD AUTO: 0.03 K/UL — SIGNIFICANT CHANGE UP (ref 0–0.2)
BASOPHILS NFR BLD AUTO: 1.1 % — SIGNIFICANT CHANGE UP (ref 0–2)
BILIRUB SERPL-MCNC: 0.5 MG/DL — SIGNIFICANT CHANGE UP (ref 0.2–1.2)
BUN SERPL-MCNC: 6 MG/DL — LOW (ref 7–23)
CALCIUM SERPL-MCNC: 8.7 MG/DL — SIGNIFICANT CHANGE UP (ref 8.5–10.1)
CHLORIDE SERPL-SCNC: 108 MMOL/L — SIGNIFICANT CHANGE UP (ref 96–108)
CO2 SERPL-SCNC: 25 MMOL/L — SIGNIFICANT CHANGE UP (ref 22–31)
CREAT SERPL-MCNC: 0.77 MG/DL — SIGNIFICANT CHANGE UP (ref 0.5–1.3)
EOSINOPHIL # BLD AUTO: 0.12 K/UL — SIGNIFICANT CHANGE UP (ref 0–0.5)
EOSINOPHIL NFR BLD AUTO: 4.3 % — SIGNIFICANT CHANGE UP (ref 0–6)
GLUCOSE SERPL-MCNC: 83 MG/DL — SIGNIFICANT CHANGE UP (ref 70–99)
HCT VFR BLD CALC: 38.3 % — LOW (ref 39–50)
HGB BLD-MCNC: 12.6 G/DL — LOW (ref 13–17)
IMM GRANULOCYTES NFR BLD AUTO: 0 % — SIGNIFICANT CHANGE UP (ref 0–1.5)
LIDOCAIN IGE QN: 315 U/L — SIGNIFICANT CHANGE UP (ref 73–393)
LYMPHOCYTES # BLD AUTO: 1.78 K/UL — SIGNIFICANT CHANGE UP (ref 1–3.3)
LYMPHOCYTES # BLD AUTO: 63.6 % — HIGH (ref 13–44)
MCHC RBC-ENTMCNC: 27.9 PG — SIGNIFICANT CHANGE UP (ref 27–34)
MCHC RBC-ENTMCNC: 32.9 GM/DL — SIGNIFICANT CHANGE UP (ref 32–36)
MCV RBC AUTO: 84.7 FL — SIGNIFICANT CHANGE UP (ref 80–100)
MONOCYTES # BLD AUTO: 0.26 K/UL — SIGNIFICANT CHANGE UP (ref 0–0.9)
MONOCYTES NFR BLD AUTO: 9.3 % — SIGNIFICANT CHANGE UP (ref 2–14)
NEUTROPHILS # BLD AUTO: 0.61 K/UL — LOW (ref 1.8–7.4)
NEUTROPHILS NFR BLD AUTO: 21.7 % — LOW (ref 43–77)
NRBC # BLD: 0 /100 WBCS — SIGNIFICANT CHANGE UP (ref 0–0)
PLATELET # BLD AUTO: 174 K/UL — SIGNIFICANT CHANGE UP (ref 150–400)
POTASSIUM SERPL-MCNC: 3.6 MMOL/L — SIGNIFICANT CHANGE UP (ref 3.5–5.3)
POTASSIUM SERPL-SCNC: 3.6 MMOL/L — SIGNIFICANT CHANGE UP (ref 3.5–5.3)
PROT SERPL-MCNC: 7.4 GM/DL — SIGNIFICANT CHANGE UP (ref 6–8.3)
RBC # BLD: 4.52 M/UL — SIGNIFICANT CHANGE UP (ref 4.2–5.8)
RBC # FLD: 15.3 % — HIGH (ref 10.3–14.5)
SODIUM SERPL-SCNC: 144 MMOL/L — SIGNIFICANT CHANGE UP (ref 135–145)
WBC # BLD: 2.8 K/UL — LOW (ref 3.8–10.5)
WBC # FLD AUTO: 2.8 K/UL — LOW (ref 3.8–10.5)

## 2020-03-21 PROCEDURE — 93010 ELECTROCARDIOGRAM REPORT: CPT

## 2020-03-21 PROCEDURE — 99285 EMERGENCY DEPT VISIT HI MDM: CPT

## 2020-03-21 RX ORDER — PANTOPRAZOLE SODIUM 20 MG/1
40 TABLET, DELAYED RELEASE ORAL ONCE
Refills: 0 | Status: COMPLETED | OUTPATIENT
Start: 2020-03-21 | End: 2020-03-21

## 2020-03-21 RX ORDER — SUCRALFATE 1 G
1 TABLET ORAL ONCE
Refills: 0 | Status: COMPLETED | OUTPATIENT
Start: 2020-03-21 | End: 2020-03-21

## 2020-03-21 RX ORDER — SODIUM CHLORIDE 9 MG/ML
2000 INJECTION INTRAMUSCULAR; INTRAVENOUS; SUBCUTANEOUS ONCE
Refills: 0 | Status: COMPLETED | OUTPATIENT
Start: 2020-03-21 | End: 2020-03-21

## 2020-03-21 RX ADMIN — Medication 1 GRAM(S): at 04:39

## 2020-03-21 RX ADMIN — SODIUM CHLORIDE 2000 MILLILITER(S): 9 INJECTION INTRAMUSCULAR; INTRAVENOUS; SUBCUTANEOUS at 06:34

## 2020-03-21 RX ADMIN — Medication 2 MILLIGRAM(S): at 04:39

## 2020-03-21 RX ADMIN — SODIUM CHLORIDE 2000 MILLILITER(S): 9 INJECTION INTRAMUSCULAR; INTRAVENOUS; SUBCUTANEOUS at 04:37

## 2020-03-21 RX ADMIN — PANTOPRAZOLE SODIUM 40 MILLIGRAM(S): 20 TABLET, DELAYED RELEASE ORAL at 04:39

## 2020-03-21 NOTE — ED PROVIDER NOTE - PHYSICAL EXAMINATION
Gen: Alert, NAD  Head: NC, AT, EOMI, normal lids/conjunctiva  ENT: normal hearing, patent oropharynx, MMM  Neck: supple, no tenderness/meningismus, FROM, Trachea midline  Pulm: Bilateral clear BS, normal resp effort, no wheeze/stridor/retractions  CV: RRR, no M/R/G, +dist pulses  Abd: soft, +mild epigastric TTP, ND, +BS, no guarding/rebound tenderness  Mskel: no edema/erythema/cyanosis  Skin: no rash  Neuro: no sensory/motor deficits

## 2020-03-21 NOTE — ED PROVIDER NOTE - OBJECTIVE STATEMENT
Pertinent PMH/PSH/FHx/SHx and Review of Systems contained within:    55yo M w PMH of EtOH abuse & gastritis presents to ED c/o exacerbation of chronic pain.  Pt well known to ED, states pain is same pain as usual.  Describes pain as "too much."    No fever/chills, No photophobia/eye pain/changes in vision, No ear pain/sore throat/dysphagia, No chest pain/palpitations, no SOB/cough/wheeze/stridor, +abdominal pain, No neck/back pain, no rash, no changes in neurological status/function.

## 2020-03-21 NOTE — ED ADULT NURSE NOTE - OBJECTIVE STATEMENT
Pt presents to ED c/o abd pain r/t drinking alcohol. Pt presents frequently for the same issues. no n/v/d, dizziness, cp, sob or other complaints. iv placed, labs drawn and sent, 20 ga in right foot as per pt request. ivf infusing well, site asymptomatic. meds given as per order. will continue to monitor awaiting dispo. Pt educated on the importance of OP followup and avoiding alcohol

## 2020-03-21 NOTE — ED ADULT NURSE NOTE - NSIMPLEMENTINTERV_GEN_ALL_ED
Implemented All Fall Risk Interventions:  [place] to call system. Call bell, personal items and telephone within reach. Instruct patient to call for assistance. Room bathroom lighting operational. Non-slip footwear when patient is off stretcher. Physically safe environment: no spills, clutter or unnecessary equipment. Stretcher in lowest position, wheels locked, appropriate side rails in place. Provide visual cue, wrist band, yellow gown, etc. Monitor gait and stability. Monitor for mental status changes and reorient to person, place, and time. Review medications for side effects contributing to fall risk. Reinforce activity limits and safety measures with patient and family.

## 2020-03-21 NOTE — ED PROVIDER NOTE - PATIENT PORTAL LINK FT
You can access the FollowMyHealth Patient Portal offered by [facility] by registering at the following website: [email or web address]. By joining [employer]’s FollowMyHealth portal, you will also be able to view your health information using other applications (apps) compatible with our system.

## 2020-03-25 ENCOUNTER — INPATIENT (INPATIENT)
Facility: HOSPITAL | Age: 55
LOS: 19 days | Discharge: HOME | End: 2020-04-14
Attending: INTERNAL MEDICINE | Admitting: INTERNAL MEDICINE
Payer: MEDICAID

## 2020-03-25 VITALS
SYSTOLIC BLOOD PRESSURE: 125 MMHG | TEMPERATURE: 97 F | HEIGHT: 67 IN | OXYGEN SATURATION: 96 % | RESPIRATION RATE: 18 BRPM | DIASTOLIC BLOOD PRESSURE: 88 MMHG | HEART RATE: 93 BPM | WEIGHT: 160.06 LBS

## 2020-03-25 DIAGNOSIS — K27.9 PEPTIC ULCER, SITE UNSPECIFIED, UNSPECIFIED AS ACUTE OR CHRONIC, WITHOUT HEMORRHAGE OR PERFORATION: ICD-10-CM

## 2020-03-25 DIAGNOSIS — F10.231 ALCOHOL DEPENDENCE WITH WITHDRAWAL DELIRIUM: ICD-10-CM

## 2020-03-25 LAB
HCT VFR BLD CALC: 42.5 % — SIGNIFICANT CHANGE UP (ref 39–50)
HGB BLD-MCNC: 14.5 G/DL — SIGNIFICANT CHANGE UP (ref 13–17)
MCHC RBC-ENTMCNC: 28.9 PG — SIGNIFICANT CHANGE UP (ref 27–34)
MCHC RBC-ENTMCNC: 34.1 GM/DL — SIGNIFICANT CHANGE UP (ref 32–36)
MCV RBC AUTO: 84.7 FL — SIGNIFICANT CHANGE UP (ref 80–100)
PLATELET # BLD AUTO: 127 K/UL — LOW (ref 150–400)
RBC # BLD: 5.02 M/UL — SIGNIFICANT CHANGE UP (ref 4.2–5.8)
RBC # FLD: 15.7 % — HIGH (ref 10.3–14.5)
WBC # BLD: 2.84 K/UL — LOW (ref 3.8–10.5)
WBC # FLD AUTO: 2.84 K/UL — LOW (ref 3.8–10.5)

## 2020-03-25 PROCEDURE — 99285 EMERGENCY DEPT VISIT HI MDM: CPT

## 2020-03-25 RX ORDER — DIAZEPAM 5 MG
5 TABLET ORAL ONCE
Refills: 0 | Status: DISCONTINUED | OUTPATIENT
Start: 2020-03-25 | End: 2020-03-25

## 2020-03-25 RX ORDER — SODIUM CHLORIDE 9 MG/ML
1000 INJECTION, SOLUTION INTRAVENOUS ONCE
Refills: 0 | Status: COMPLETED | OUTPATIENT
Start: 2020-03-25 | End: 2020-03-25

## 2020-03-25 RX ORDER — MORPHINE SULFATE 50 MG/1
4 CAPSULE, EXTENDED RELEASE ORAL ONCE
Refills: 0 | Status: DISCONTINUED | OUTPATIENT
Start: 2020-03-25 | End: 2020-03-25

## 2020-03-25 RX ADMIN — SODIUM CHLORIDE 1000 MILLILITER(S): 9 INJECTION, SOLUTION INTRAVENOUS at 23:30

## 2020-03-25 NOTE — ED PROVIDER NOTE - OBJECTIVE STATEMENT
55 y/o M Hx of substance abuse presents with complains of abdominal pain.   HPI limited by intoxification.

## 2020-03-25 NOTE — ED PROVIDER NOTE - PHYSICAL EXAMINATION
Gen: Intoxicated, NAD  Head: NC, AT   Eyes: PERRL, EOMI, normal lids/conjunctiva  ENT: normal hearing, patent oropharynx without erythema/exudate, uvula midline  Neck: supple, no tenderness, Trachea midline  Pulm: Bilateral BS, normal resp effort, no wheeze/stridor/retractions  CV: RRR, no M/R/G, 2+ radial and dp pulses bl, no edema  Abd: abominal tenderness, ND, +BS, no hepatosplenomegaly  Mskel: extremities x4 with normal ROM and no joint effusions. no ctl spine ttp.   Skin: no rash, no bruising   Neuro: AAOx3, no sensory/motor deficits, CN 2-12 intact

## 2020-03-26 DIAGNOSIS — F10.239 ALCOHOL DEPENDENCE WITH WITHDRAWAL, UNSPECIFIED: ICD-10-CM

## 2020-03-26 DIAGNOSIS — E78.2 MIXED HYPERLIPIDEMIA: ICD-10-CM

## 2020-03-26 DIAGNOSIS — K29.20 ALCOHOLIC GASTRITIS WITHOUT BLEEDING: ICD-10-CM

## 2020-03-26 DIAGNOSIS — K27.9 PEPTIC ULCER, SITE UNSPECIFIED, UNSPECIFIED AS ACUTE OR CHRONIC, WITHOUT HEMORRHAGE OR PERFORATION: ICD-10-CM

## 2020-03-26 DIAGNOSIS — Z29.9 ENCOUNTER FOR PROPHYLACTIC MEASURES, UNSPECIFIED: ICD-10-CM

## 2020-03-26 LAB
ALBUMIN SERPL ELPH-MCNC: 3.4 G/DL — SIGNIFICANT CHANGE UP (ref 3.3–5)
ALBUMIN SERPL ELPH-MCNC: 3.8 G/DL — SIGNIFICANT CHANGE UP (ref 3.3–5)
ALP SERPL-CCNC: 52 U/L — SIGNIFICANT CHANGE UP (ref 40–120)
ALP SERPL-CCNC: 59 U/L — SIGNIFICANT CHANGE UP (ref 40–120)
ALT FLD-CCNC: 62 U/L — SIGNIFICANT CHANGE UP (ref 12–78)
ALT FLD-CCNC: 70 U/L — SIGNIFICANT CHANGE UP (ref 12–78)
ANION GAP SERPL CALC-SCNC: 10 MMOL/L — SIGNIFICANT CHANGE UP (ref 5–17)
ANION GAP SERPL CALC-SCNC: 13 MMOL/L — SIGNIFICANT CHANGE UP (ref 5–17)
AST SERPL-CCNC: 111 U/L — HIGH (ref 15–37)
AST SERPL-CCNC: 128 U/L — HIGH (ref 15–37)
BASOPHILS # BLD AUTO: 0 K/UL — SIGNIFICANT CHANGE UP (ref 0–0.2)
BASOPHILS NFR BLD AUTO: 0 % — SIGNIFICANT CHANGE UP (ref 0–2)
BILIRUB DIRECT SERPL-MCNC: 0.17 MG/DL — SIGNIFICANT CHANGE UP (ref 0.05–0.2)
BILIRUB INDIRECT FLD-MCNC: 0.3 MG/DL — SIGNIFICANT CHANGE UP (ref 0.2–1)
BILIRUB SERPL-MCNC: 0.4 MG/DL — SIGNIFICANT CHANGE UP (ref 0.2–1.2)
BILIRUB SERPL-MCNC: 0.5 MG/DL — SIGNIFICANT CHANGE UP (ref 0.2–1.2)
BUN SERPL-MCNC: 7 MG/DL — SIGNIFICANT CHANGE UP (ref 7–23)
BUN SERPL-MCNC: 9 MG/DL — SIGNIFICANT CHANGE UP (ref 7–23)
CALCIUM SERPL-MCNC: 8.5 MG/DL — SIGNIFICANT CHANGE UP (ref 8.5–10.1)
CALCIUM SERPL-MCNC: 8.8 MG/DL — SIGNIFICANT CHANGE UP (ref 8.5–10.1)
CHLORIDE SERPL-SCNC: 104 MMOL/L — SIGNIFICANT CHANGE UP (ref 96–108)
CHLORIDE SERPL-SCNC: 106 MMOL/L — SIGNIFICANT CHANGE UP (ref 96–108)
CO2 SERPL-SCNC: 23 MMOL/L — SIGNIFICANT CHANGE UP (ref 22–31)
CO2 SERPL-SCNC: 25 MMOL/L — SIGNIFICANT CHANGE UP (ref 22–31)
CREAT SERPL-MCNC: 0.82 MG/DL — SIGNIFICANT CHANGE UP (ref 0.5–1.3)
CREAT SERPL-MCNC: 0.91 MG/DL — SIGNIFICANT CHANGE UP (ref 0.5–1.3)
EOSINOPHIL # BLD AUTO: 0 K/UL — SIGNIFICANT CHANGE UP (ref 0–0.5)
EOSINOPHIL NFR BLD AUTO: 0 % — SIGNIFICANT CHANGE UP (ref 0–6)
ETHANOL SERPL-MCNC: 369 MG/DL — HIGH (ref 0–10)
GLUCOSE SERPL-MCNC: 106 MG/DL — HIGH (ref 70–99)
GLUCOSE SERPL-MCNC: 78 MG/DL — SIGNIFICANT CHANGE UP (ref 70–99)
LIDOCAIN IGE QN: 372 U/L — SIGNIFICANT CHANGE UP (ref 73–393)
LYMPHOCYTES # BLD AUTO: 2.04 K/UL — SIGNIFICANT CHANGE UP (ref 1–3.3)
LYMPHOCYTES # BLD AUTO: 72 % — HIGH (ref 13–44)
MAGNESIUM SERPL-MCNC: 1.5 MG/DL — LOW (ref 1.6–2.6)
MAGNESIUM SERPL-MCNC: 1.9 MG/DL — SIGNIFICANT CHANGE UP (ref 1.6–2.6)
MONOCYTES # BLD AUTO: 0.23 K/UL — SIGNIFICANT CHANGE UP (ref 0–0.9)
MONOCYTES NFR BLD AUTO: 8 % — SIGNIFICANT CHANGE UP (ref 2–14)
NEUTROPHILS # BLD AUTO: 0.45 K/UL — LOW (ref 1.8–7.4)
NEUTROPHILS NFR BLD AUTO: 20 % — LOW (ref 43–77)
NRBC # BLD: 0 /100 — SIGNIFICANT CHANGE UP (ref 0–0)
NRBC # BLD: SIGNIFICANT CHANGE UP /100 WBCS (ref 0–0)
PHOSPHATE SERPL-MCNC: 2.1 MG/DL — LOW (ref 2.5–4.5)
PLAT MORPH BLD: NORMAL — SIGNIFICANT CHANGE UP
POTASSIUM SERPL-MCNC: 4.2 MMOL/L — SIGNIFICANT CHANGE UP (ref 3.5–5.3)
POTASSIUM SERPL-MCNC: 4.3 MMOL/L — SIGNIFICANT CHANGE UP (ref 3.5–5.3)
POTASSIUM SERPL-SCNC: 4.2 MMOL/L — SIGNIFICANT CHANGE UP (ref 3.5–5.3)
POTASSIUM SERPL-SCNC: 4.3 MMOL/L — SIGNIFICANT CHANGE UP (ref 3.5–5.3)
PROT SERPL-MCNC: 7.4 GM/DL — SIGNIFICANT CHANGE UP (ref 6–8.3)
PROT SERPL-MCNC: 8.2 GM/DL — SIGNIFICANT CHANGE UP (ref 6–8.3)
RBC BLD AUTO: SIGNIFICANT CHANGE UP
SODIUM SERPL-SCNC: 139 MMOL/L — SIGNIFICANT CHANGE UP (ref 135–145)
SODIUM SERPL-SCNC: 142 MMOL/L — SIGNIFICANT CHANGE UP (ref 135–145)

## 2020-03-26 PROCEDURE — 93010 ELECTROCARDIOGRAM REPORT: CPT

## 2020-03-26 PROCEDURE — 99232 SBSQ HOSP IP/OBS MODERATE 35: CPT

## 2020-03-26 RX ORDER — POTASSIUM PHOSPHATE, MONOBASIC POTASSIUM PHOSPHATE, DIBASIC 236; 224 MG/ML; MG/ML
30 INJECTION, SOLUTION INTRAVENOUS ONCE
Refills: 0 | Status: COMPLETED | OUTPATIENT
Start: 2020-03-26 | End: 2020-03-26

## 2020-03-26 RX ORDER — PANTOPRAZOLE SODIUM 20 MG/1
40 TABLET, DELAYED RELEASE ORAL
Refills: 0 | Status: DISCONTINUED | OUTPATIENT
Start: 2020-03-26 | End: 2020-04-03

## 2020-03-26 RX ORDER — SODIUM CHLORIDE 9 MG/ML
1000 INJECTION, SOLUTION INTRAVENOUS ONCE
Refills: 0 | Status: COMPLETED | OUTPATIENT
Start: 2020-03-26 | End: 2020-03-26

## 2020-03-26 RX ORDER — FOLIC ACID 0.8 MG
1 TABLET ORAL DAILY
Refills: 0 | Status: DISCONTINUED | OUTPATIENT
Start: 2020-03-26 | End: 2020-04-14

## 2020-03-26 RX ORDER — METOPROLOL TARTRATE 50 MG
25 TABLET ORAL EVERY 12 HOURS
Refills: 0 | Status: DISCONTINUED | OUTPATIENT
Start: 2020-03-26 | End: 2020-03-29

## 2020-03-26 RX ORDER — MORPHINE SULFATE 50 MG/1
2 CAPSULE, EXTENDED RELEASE ORAL ONCE
Refills: 0 | Status: DISCONTINUED | OUTPATIENT
Start: 2020-03-26 | End: 2020-03-26

## 2020-03-26 RX ORDER — MAGNESIUM SULFATE 500 MG/ML
2 VIAL (ML) INJECTION ONCE
Refills: 0 | Status: COMPLETED | OUTPATIENT
Start: 2020-03-26 | End: 2020-03-26

## 2020-03-26 RX ORDER — SUCRALFATE 1 G
1 TABLET ORAL
Refills: 0 | Status: DISCONTINUED | OUTPATIENT
Start: 2020-03-26 | End: 2020-04-13

## 2020-03-26 RX ORDER — ONDANSETRON 8 MG/1
8 TABLET, FILM COATED ORAL ONCE
Refills: 0 | Status: COMPLETED | OUTPATIENT
Start: 2020-03-26 | End: 2020-03-26

## 2020-03-26 RX ORDER — ONDANSETRON 8 MG/1
4 TABLET, FILM COATED ORAL EVERY 6 HOURS
Refills: 0 | Status: COMPLETED | OUTPATIENT
Start: 2020-03-26 | End: 2020-03-27

## 2020-03-26 RX ORDER — ATORVASTATIN CALCIUM 80 MG/1
20 TABLET, FILM COATED ORAL AT BEDTIME
Refills: 0 | Status: DISCONTINUED | OUTPATIENT
Start: 2020-03-26 | End: 2020-04-13

## 2020-03-26 RX ORDER — DIAZEPAM 5 MG
5 TABLET ORAL ONCE
Refills: 0 | Status: DISCONTINUED | OUTPATIENT
Start: 2020-03-26 | End: 2020-03-26

## 2020-03-26 RX ORDER — THIAMINE MONONITRATE (VIT B1) 100 MG
100 TABLET ORAL DAILY
Refills: 0 | Status: COMPLETED | OUTPATIENT
Start: 2020-03-26 | End: 2020-03-29

## 2020-03-26 RX ORDER — SODIUM CHLORIDE 9 MG/ML
1000 INJECTION INTRAMUSCULAR; INTRAVENOUS; SUBCUTANEOUS
Refills: 0 | Status: DISCONTINUED | OUTPATIENT
Start: 2020-03-26 | End: 2020-03-29

## 2020-03-26 RX ADMIN — Medication 20 MILLIGRAM(S): at 17:56

## 2020-03-26 RX ADMIN — MORPHINE SULFATE 4 MILLIGRAM(S): 50 CAPSULE, EXTENDED RELEASE ORAL at 00:19

## 2020-03-26 RX ADMIN — Medication 5 MILLIGRAM(S): at 02:06

## 2020-03-26 RX ADMIN — ONDANSETRON 8 MILLIGRAM(S): 8 TABLET, FILM COATED ORAL at 02:06

## 2020-03-26 RX ADMIN — Medication 1 GRAM(S): at 17:56

## 2020-03-26 RX ADMIN — Medication 25 MILLIGRAM(S): at 17:56

## 2020-03-26 RX ADMIN — ONDANSETRON 4 MILLIGRAM(S): 8 TABLET, FILM COATED ORAL at 23:52

## 2020-03-26 RX ADMIN — Medication 2 MILLIGRAM(S): at 22:02

## 2020-03-26 RX ADMIN — Medication 1 MILLIGRAM(S): at 12:26

## 2020-03-26 RX ADMIN — Medication 75 MILLIGRAM(S): at 03:56

## 2020-03-26 RX ADMIN — Medication 1 GRAM(S): at 23:52

## 2020-03-26 RX ADMIN — POTASSIUM PHOSPHATE, MONOBASIC POTASSIUM PHOSPHATE, DIBASIC 83.33 MILLIMOLE(S): 236; 224 INJECTION, SOLUTION INTRAVENOUS at 22:47

## 2020-03-26 RX ADMIN — Medication 5 MILLIGRAM(S): at 00:19

## 2020-03-26 RX ADMIN — Medication 50 MILLIGRAM(S): at 13:32

## 2020-03-26 RX ADMIN — Medication 1 MILLIGRAM(S): at 22:02

## 2020-03-26 RX ADMIN — SODIUM CHLORIDE 100 MILLILITER(S): 9 INJECTION INTRAMUSCULAR; INTRAVENOUS; SUBCUTANEOUS at 22:47

## 2020-03-26 RX ADMIN — ATORVASTATIN CALCIUM 20 MILLIGRAM(S): 80 TABLET, FILM COATED ORAL at 22:02

## 2020-03-26 RX ADMIN — ONDANSETRON 4 MILLIGRAM(S): 8 TABLET, FILM COATED ORAL at 17:55

## 2020-03-26 RX ADMIN — MORPHINE SULFATE 2 MILLIGRAM(S): 50 CAPSULE, EXTENDED RELEASE ORAL at 12:25

## 2020-03-26 RX ADMIN — MORPHINE SULFATE 2 MILLIGRAM(S): 50 CAPSULE, EXTENDED RELEASE ORAL at 03:56

## 2020-03-26 RX ADMIN — Medication 1 TABLET(S): at 22:02

## 2020-03-26 RX ADMIN — SODIUM CHLORIDE 1000 MILLILITER(S): 9 INJECTION, SOLUTION INTRAVENOUS at 02:06

## 2020-03-26 RX ADMIN — Medication 50 GRAM(S): at 22:02

## 2020-03-26 NOTE — ED ADULT NURSE NOTE - OBJECTIVE STATEMENT
pt presents to the ED c/o generalized abdominal pain. pt states he drinks a bottle of vodka everyday. pt denies chest pain, denies SOB, denies difficulty breathing, denies nausea/ vomiting, denies fever, body aches, chills, denies urinary symptoms.

## 2020-03-26 NOTE — H&P ADULT - HISTORY OF PRESENT ILLNESS
54 year old male, PMH Etoh abuse, HTN, gastritis presents to the ED with complains of abdominal pain and vomiting that started 3 days ago. Patient reports pain in 8/10, constant, dull and located in epigastric area. Denies chest pain, SOB, palpitations, cough or diarrhea. Patient reports he did not have alcohol to drink in three weeks. BAL on admission is 369.

## 2020-03-26 NOTE — H&P ADULT - NSHPLABSRESULTS_GEN_ALL_CORE
14.5   2.84  )-----------( 127      ( 25 Mar 2020 23:32 )             42.5     03-26    142  |  106  |  9   ----------------------------<  78  4.3   |  23  |  0.91    Ca    8.8      26 Mar 2020 00:19  Mg     1.9     03-26    TPro  8.2  /  Alb  3.8  /  TBili  0.4  /  DBili  x   /  AST  128<H>  /  ALT  70  /  AlkPhos  59  03-26      CAPILLARY BLOOD GLUCOSE

## 2020-03-26 NOTE — H&P ADULT - NSHPSOCIALHISTORY_GEN_ALL_CORE
Patient denies that he had alcohol in the last 3 weeks.  on admission  Denies use of tobacco and recreational drugs

## 2020-03-26 NOTE — H&P ADULT - NSHPREVIEWOFSYSTEMS_GEN_ALL_CORE
CONSTITUTIONAL: No fever, weight loss or fatigue  EYES: No eye pain, visual disturbances, or discharge  ENMT: Denies  difficulty hearing, tinnitis, vertigo, sinus or   throat pain   NECK: Denies pain or stiffness  BREAST: Denies pain, masses, or nipple discharge    RESP: Denies SOB, dyspnea, cough, wheezing, chills or hemoptysis   CV: Denies  chest pain, SOB, ANGEL, palpitations, dizziness, lightheadedness or leg swelling  GI: Epigastric pain, nausea, vomiting; Denies hematemesis; diarrhea or changes in bowel pattern, no melena or hematochezia.  : Denies dysuria, urgency, frequency, retention, hematuria or incontinence  SKIN: Denies itching, burning, rashes or lesions  MSK: Denies edema, pain, difficulty with ambulation, joint pain or swelling, muscle or back pain  NEURO: Denies weakness, numbness, tingling, loss of sensation, vision, headaches, memory loss  LYMPH: Denies pain or enlarged glands  ENDO: Denies heat or cold intolerances, hair loss  PSYCH: Denies depression, anxiety of SI  HEME: Denies easy brusing or bleeding gums  ALLERGY/IMMUNOLOGY: Denies hives, eczema

## 2020-03-26 NOTE — ED ADULT NURSE REASSESSMENT NOTE - NS ED NURSE REASSESS COMMENT FT1
patient received in bed 27 a/o x4 c/o tremors with headaches and requesting injections to stop his tremors and headaches. CIWA-A  score 9.Will inform MD.

## 2020-03-26 NOTE — ED ADULT NURSE NOTE - ED STAT RN HANDOFF DETAILS
report given to RN apolinar. no acute distress noted. will continue to monitor. pending EKG, RN made aware. pt sleeping in bed comfortably. Report endorsed to oncoming RN. Safety checks compld this shift/Safety rounds completed hourly.  IV sites checked Q2+remains WDL. Meds given as ord with no s/s of adverse RXNs. Fall +skin precs in place. Any issues endorsed to oncoming RN for follow up.

## 2020-03-26 NOTE — H&P ADULT - NSHPPHYSICALEXAM_GEN_ALL_CORE
Vital Signs Last 24 Hrs  T(C): 36.7 (26 Mar 2020 11:19), Max: 36.9 (26 Mar 2020 08:14)  T(F): 98 (26 Mar 2020 11:19), Max: 98.5 (26 Mar 2020 08:14)  HR: 90 (26 Mar 2020 11:19) (65 - 96)  BP: 126/80 (26 Mar 2020 11:19) (125/88 - 159/111)  BP(mean): --  RR: 19 (26 Mar 2020 11:19) (17 - 19)  SpO2: 96% (26 Mar 2020 11:19) (96% - 100%)        GENERAL: Intoxicated  HEAD:  Atraumatic, Normocephalic  EYES: EOMI, PERRLA, conjunctiva and sclera clear  ENMT: No tonsillar erythema, exudates, or enlargement; Moist mucous membranes, No lesions  NECK: Supple, No JVD, Normal thyroid  NERVOUS SYSTEM:  Alert & Oriented X3, Motor Strength 5/5 B/L upper and lower extremities; DTRs 2+ intact and symmetric  CHEST/LUNG: Clear to percussion bilaterally; No rales, rhonchi, wheezing, or rubs  HEART: Regular rate and rhythm; No murmurs, rubs, or gallops  ABDOMEN: Soft, tender to palpation, Nondistended; Bowel sounds present  EXTREMITIES:  2+ Peripheral Pulses, No clubbing, cyanosis, or edema  LYMPH: No lymphadenopathy noted  SKIN: No rashes or lesions

## 2020-03-26 NOTE — ED ADULT NURSE REASSESSMENT NOTE - NS ED NURSE REASSESS COMMENT FT1
received er bed 25 from previous rn admitted tele awaiting bed medicated for increased ciwa score will reassess and continue to monitor

## 2020-03-26 NOTE — PATIENT PROFILE ADULT - PATIENT REPRESENTATIVE: ( YOU CAN CHOOSE ANY PERSON THAT CAN ASSIST YOU WITH YOUR HEALTH CARE PREFERENCES, DOES NOT HAVE TO BE A SPOUSE, IMMEDIATE FAMILY OR SIGNIFICANT OTHER/PARTNER)
declines Niacinamide Counseling: I recommended taking niacin or niacinamide, also know as vitamin B3, twice daily. Recent evidence suggests that taking vitamin B3 (500 mg twice daily) can reduce the risk of actinic keratoses and non-melanoma skin cancers. Side effects of vitamin B3 include flushing and headache.

## 2020-03-26 NOTE — H&P ADULT - ASSESSMENT
54 year old male, PMH Etoh abuse, HTN, gastritis presents to the ED with complains of abdominal pain and vomiting that started 3 days ago. Patient reports pain in 8/10, constant, dull and located in epigastric area. Denies chest pain, SOB, palpitations, cough or diarrhea. Patient reports he did not have alcohol to drink in three weeks. BAL on admission is 369.   IMPROVE VTE Individual Risk Assessment          RISK                                                          Points    [  ] Previous VTE                                                3    [  ] Thrombophilia                                             2    [  ] Lower limb paralysis                                    2        (unable to hold up >15 seconds)      [  ] Current Cancer                                             2         (within 6 months)    [  ] Immobilization > 24 hrs                              1    [  ] ICU/CCU stay > 24 hours                            1    [  ] Age > 60                                                    1    IMPROVE VTE Score _________0    Low risk 0-1: [x  ] Early ambulation                          [  ] Intermittent Compression Device    High Risk 2-12: [  ] Heparin 5,000 units SC Q8 H                              [  ] Heparin 5,000 units SC Q 12 H                              [  ] LMWH 40 mg SC daily (CrCl > 30 ml)

## 2020-03-26 NOTE — ED ADULT NURSE NOTE - NSIMPLEMENTINTERV_GEN_ALL_ED
Implemented All Fall Risk Interventions:  [place] to call system. Call bell, personal items and telephone within reach. Instruct patient to call for assistance. Room bathroom lighting operational. Non-slip footwear when patient is off stretcher. Physically safe environment: no spills, clutter or unnecessary equipment. Stretcher in lowest position, wheels locked, appropriate side rails in place. Provide visual cue, wrist band, yellow gown, etc. Monitor gait and stability. Monitor for mental status changes and reorient to person, place, and time. Review medications for side effects contributing to fall risk. Reinforce activity limits and safety measures with patient and family.

## 2020-03-27 LAB
ANION GAP SERPL CALC-SCNC: 9 MMOL/L — SIGNIFICANT CHANGE UP (ref 5–17)
BUN SERPL-MCNC: 8 MG/DL — SIGNIFICANT CHANGE UP (ref 7–23)
CALCIUM SERPL-MCNC: 8.4 MG/DL — LOW (ref 8.5–10.1)
CHLORIDE SERPL-SCNC: 104 MMOL/L — SIGNIFICANT CHANGE UP (ref 96–108)
CO2 SERPL-SCNC: 23 MMOL/L — SIGNIFICANT CHANGE UP (ref 22–31)
CREAT SERPL-MCNC: 0.73 MG/DL — SIGNIFICANT CHANGE UP (ref 0.5–1.3)
GLUCOSE SERPL-MCNC: 64 MG/DL — LOW (ref 70–99)
HCT VFR BLD CALC: 38.1 % — LOW (ref 39–50)
HGB BLD-MCNC: 12.1 G/DL — LOW (ref 13–17)
MAGNESIUM SERPL-MCNC: 2.2 MG/DL — SIGNIFICANT CHANGE UP (ref 1.6–2.6)
MCHC RBC-ENTMCNC: 27.8 PG — SIGNIFICANT CHANGE UP (ref 27–34)
MCHC RBC-ENTMCNC: 31.8 GM/DL — LOW (ref 32–36)
MCV RBC AUTO: 87.6 FL — SIGNIFICANT CHANGE UP (ref 80–100)
NRBC # BLD: 0 /100 WBCS — SIGNIFICANT CHANGE UP (ref 0–0)
PHOSPHATE SERPL-MCNC: 3.1 MG/DL — SIGNIFICANT CHANGE UP (ref 2.5–4.5)
PLATELET # BLD AUTO: 88 K/UL — LOW (ref 150–400)
POTASSIUM SERPL-MCNC: 4.3 MMOL/L — SIGNIFICANT CHANGE UP (ref 3.5–5.3)
POTASSIUM SERPL-SCNC: 4.3 MMOL/L — SIGNIFICANT CHANGE UP (ref 3.5–5.3)
RBC # BLD: 4.35 M/UL — SIGNIFICANT CHANGE UP (ref 4.2–5.8)
RBC # FLD: 15.2 % — HIGH (ref 10.3–14.5)
SODIUM SERPL-SCNC: 136 MMOL/L — SIGNIFICANT CHANGE UP (ref 135–145)
WBC # BLD: 2.74 K/UL — LOW (ref 3.8–10.5)
WBC # FLD AUTO: 2.74 K/UL — LOW (ref 3.8–10.5)

## 2020-03-27 PROCEDURE — 99232 SBSQ HOSP IP/OBS MODERATE 35: CPT

## 2020-03-27 RX ORDER — ACETAMINOPHEN 500 MG
650 TABLET ORAL EVERY 4 HOURS
Refills: 0 | Status: DISCONTINUED | OUTPATIENT
Start: 2020-03-27 | End: 2020-04-14

## 2020-03-27 RX ADMIN — Medication 650 MILLIGRAM(S): at 20:31

## 2020-03-27 RX ADMIN — Medication 1 MILLIGRAM(S): at 11:02

## 2020-03-27 RX ADMIN — Medication 2 MILLIGRAM(S): at 15:46

## 2020-03-27 RX ADMIN — SODIUM CHLORIDE 100 MILLILITER(S): 9 INJECTION INTRAMUSCULAR; INTRAVENOUS; SUBCUTANEOUS at 06:17

## 2020-03-27 RX ADMIN — Medication 1.5 MILLIGRAM(S): at 22:01

## 2020-03-27 RX ADMIN — Medication 20 MILLIGRAM(S): at 17:56

## 2020-03-27 RX ADMIN — Medication 1 GRAM(S): at 06:18

## 2020-03-27 RX ADMIN — Medication 2 MILLIGRAM(S): at 01:53

## 2020-03-27 RX ADMIN — Medication 1 GRAM(S): at 17:55

## 2020-03-27 RX ADMIN — ONDANSETRON 4 MILLIGRAM(S): 8 TABLET, FILM COATED ORAL at 11:00

## 2020-03-27 RX ADMIN — Medication 25 MILLIGRAM(S): at 17:56

## 2020-03-27 RX ADMIN — Medication 1 TABLET(S): at 11:00

## 2020-03-27 RX ADMIN — Medication 2 MILLIGRAM(S): at 10:55

## 2020-03-27 RX ADMIN — Medication 1 GRAM(S): at 11:00

## 2020-03-27 RX ADMIN — ONDANSETRON 4 MILLIGRAM(S): 8 TABLET, FILM COATED ORAL at 23:03

## 2020-03-27 RX ADMIN — PANTOPRAZOLE SODIUM 40 MILLIGRAM(S): 20 TABLET, DELAYED RELEASE ORAL at 10:54

## 2020-03-27 RX ADMIN — Medication 20 MILLIGRAM(S): at 10:54

## 2020-03-27 RX ADMIN — ONDANSETRON 4 MILLIGRAM(S): 8 TABLET, FILM COATED ORAL at 17:57

## 2020-03-27 RX ADMIN — Medication 25 MILLIGRAM(S): at 06:18

## 2020-03-27 RX ADMIN — Medication 2 MILLIGRAM(S): at 17:55

## 2020-03-27 RX ADMIN — Medication 20 MILLIGRAM(S): at 15:47

## 2020-03-27 RX ADMIN — Medication 650 MILLIGRAM(S): at 21:31

## 2020-03-27 RX ADMIN — Medication 100 MILLIGRAM(S): at 11:00

## 2020-03-27 RX ADMIN — Medication 1 GRAM(S): at 23:03

## 2020-03-27 RX ADMIN — ONDANSETRON 4 MILLIGRAM(S): 8 TABLET, FILM COATED ORAL at 06:18

## 2020-03-27 RX ADMIN — ATORVASTATIN CALCIUM 20 MILLIGRAM(S): 80 TABLET, FILM COATED ORAL at 22:02

## 2020-03-27 RX ADMIN — Medication 2 MILLIGRAM(S): at 06:18

## 2020-03-27 RX ADMIN — SODIUM CHLORIDE 100 MILLILITER(S): 9 INJECTION INTRAMUSCULAR; INTRAVENOUS; SUBCUTANEOUS at 17:57

## 2020-03-27 NOTE — PROGRESS NOTE ADULT - SUBJECTIVE AND OBJECTIVE BOX
Patient is a 54y old  Male who presents with a chief complaint of Abdominal Pain (26 Mar 2020 13:09)      SUBJECTIVE / OVERNIGHT EVENTS: No events over night.     T(C): 36.4 (03-27-20 @ 11:27), Max: 36.8 (03-27-20 @ 05:33)  HR: 66 (03-27-20 @ 11:27) (65 - 66)  BP: 111/75 (03-27-20 @ 11:27) (111/75 - 123/85)  RR: 18 (03-27-20 @ 11:27) (18 - 18)  SpO2: 98% (03-27-20 @ 11:27) (96% - 98%)    MEDICATIONS  (STANDING):  atorvastatin 20 milliGRAM(s) Oral at bedtime  dicyclomine 20 milliGRAM(s) Oral three times a day before meals  folic acid 1 milliGRAM(s) Oral daily  LORazepam   Injectable 2 milliGRAM(s) IV Push every 4 hours  LORazepam   Injectable 1.5 milliGRAM(s) IV Push every 4 hours  LORazepam   Injectable   IV Push   metoprolol tartrate 25 milliGRAM(s) Oral every 12 hours  multivitamin 1 Tablet(s) Oral daily  ondansetron Injectable 4 milliGRAM(s) IV Push every 6 hours  pantoprazole    Tablet 40 milliGRAM(s) Oral before breakfast  sodium chloride 0.9%. 1000 milliLiter(s) (100 mL/Hr) IV Continuous <Continuous>  sucralfate suspension 1 Gram(s) Oral four times a day  thiamine 100 milliGRAM(s) Oral daily    MEDICATIONS  (PRN):      CAPILLARY BLOOD GLUCOSE  PHYSICAL EXAM:  GENERAL: NAD, well-developed  HEAD:  Atraumatic, Normocephalic  EYES: EOMI, conjunctiva and sclera clear  NECK: Supple, No JVD  CHEST/LUNG: Clear to auscultation bilaterally; No wheeze  HEART: Regular rate and rhythm; No murmurs, rubs, or gallops  ABDOMEN: Soft, Nontender, Nondistended; Bowel sounds present  EXTREMITIES:  2+ Peripheral Pulses, No clubbing, cyanosis, or edema  PSYCH: AAOx3  NEUROLOGY: non-focal  SKIN: No rashes or lesions                            12.1   2.74  )-----------( 88       ( 27 Mar 2020 06:48 )             38.1           LIVER FUNCTIONS - ( 26 Mar 2020 15:25 )  Alb: 3.4 g/dL / Pro: 7.4 gm/dL / ALK PHOS: 52 U/L / ALT: 62 U/L / AST: 111 U/L / GGT: x             136|104|8<64  4.3|23|0.73  8.4,2.2,3.1  03-27 @ 06:48  139|104|7<106  4.2|25|0.82  8.5,1.5,2.1  03-26 @ 15:25  TPro  7.4  /  Alb  3.4  /  TBili  0.5  /  DBili  .17  /  AST  111<H>  /  ALT  62  /  AlkPhos  52  03-26              RADIOLOGY & ADDITIONAL TESTS:    Imaging Personally Reviewed:    Consultant(s) Notes Reviewed:      Care Discussed with Consultants/Other Providers:

## 2020-03-28 LAB
ALBUMIN SERPL ELPH-MCNC: 3.4 G/DL — SIGNIFICANT CHANGE UP (ref 3.3–5)
ALP SERPL-CCNC: 57 U/L — SIGNIFICANT CHANGE UP (ref 40–120)
ALT FLD-CCNC: 41 U/L — SIGNIFICANT CHANGE UP (ref 12–78)
ANION GAP SERPL CALC-SCNC: 12 MMOL/L — SIGNIFICANT CHANGE UP (ref 5–17)
AST SERPL-CCNC: 44 U/L — HIGH (ref 15–37)
BILIRUB DIRECT SERPL-MCNC: 0.21 MG/DL — HIGH (ref 0.05–0.2)
BILIRUB INDIRECT FLD-MCNC: 0.8 MG/DL — SIGNIFICANT CHANGE UP (ref 0.2–1)
BILIRUB SERPL-MCNC: 1 MG/DL — SIGNIFICANT CHANGE UP (ref 0.2–1.2)
BUN SERPL-MCNC: 4 MG/DL — LOW (ref 7–23)
CALCIUM SERPL-MCNC: 9 MG/DL — SIGNIFICANT CHANGE UP (ref 8.5–10.1)
CHLORIDE SERPL-SCNC: 107 MMOL/L — SIGNIFICANT CHANGE UP (ref 96–108)
CO2 SERPL-SCNC: 18 MMOL/L — LOW (ref 22–31)
CREAT SERPL-MCNC: 1.09 MG/DL — SIGNIFICANT CHANGE UP (ref 0.5–1.3)
GLUCOSE SERPL-MCNC: 130 MG/DL — HIGH (ref 70–99)
MAGNESIUM SERPL-MCNC: 1.8 MG/DL — SIGNIFICANT CHANGE UP (ref 1.6–2.6)
PHOSPHATE SERPL-MCNC: 1.5 MG/DL — LOW (ref 2.5–4.5)
POTASSIUM SERPL-MCNC: 3.5 MMOL/L — SIGNIFICANT CHANGE UP (ref 3.5–5.3)
POTASSIUM SERPL-SCNC: 3.5 MMOL/L — SIGNIFICANT CHANGE UP (ref 3.5–5.3)
PROT SERPL-MCNC: 7.3 GM/DL — SIGNIFICANT CHANGE UP (ref 6–8.3)
SODIUM SERPL-SCNC: 137 MMOL/L — SIGNIFICANT CHANGE UP (ref 135–145)

## 2020-03-28 PROCEDURE — 93010 ELECTROCARDIOGRAM REPORT: CPT

## 2020-03-28 RX ORDER — DIAZEPAM 5 MG
5 TABLET ORAL ONCE
Refills: 0 | Status: DISCONTINUED | OUTPATIENT
Start: 2020-03-28 | End: 2020-03-28

## 2020-03-28 RX ORDER — PHENOBARBITAL 60 MG
130 TABLET ORAL EVERY 6 HOURS
Refills: 0 | Status: DISCONTINUED | OUTPATIENT
Start: 2020-03-28 | End: 2020-03-28

## 2020-03-28 RX ORDER — OLANZAPINE 15 MG/1
10 TABLET, FILM COATED ORAL ONCE
Refills: 0 | Status: COMPLETED | OUTPATIENT
Start: 2020-03-28 | End: 2020-03-28

## 2020-03-28 RX ORDER — PHENOBARBITAL 60 MG
130 TABLET ORAL EVERY 6 HOURS
Refills: 0 | Status: DISCONTINUED | OUTPATIENT
Start: 2020-03-28 | End: 2020-03-29

## 2020-03-28 RX ORDER — DEXMEDETOMIDINE HYDROCHLORIDE IN 0.9% SODIUM CHLORIDE 4 UG/ML
0.3 INJECTION INTRAVENOUS
Qty: 200 | Refills: 0 | Status: DISCONTINUED | OUTPATIENT
Start: 2020-03-28 | End: 2020-03-30

## 2020-03-28 RX ADMIN — Medication 1.5 MILLIGRAM(S): at 09:20

## 2020-03-28 RX ADMIN — Medication 1.5 MILLIGRAM(S): at 05:07

## 2020-03-28 RX ADMIN — Medication 130 MILLIGRAM(S): at 13:19

## 2020-03-28 RX ADMIN — Medication 1.5 MILLIGRAM(S): at 14:57

## 2020-03-28 RX ADMIN — Medication 2 MILLIGRAM(S): at 04:42

## 2020-03-28 RX ADMIN — Medication 1 GRAM(S): at 07:57

## 2020-03-28 RX ADMIN — Medication 1.5 MILLIGRAM(S): at 18:43

## 2020-03-28 RX ADMIN — PANTOPRAZOLE SODIUM 40 MILLIGRAM(S): 20 TABLET, DELAYED RELEASE ORAL at 07:57

## 2020-03-28 RX ADMIN — Medication 5 MILLIGRAM(S): at 09:30

## 2020-03-28 RX ADMIN — Medication 1 TABLET(S): at 13:32

## 2020-03-28 RX ADMIN — Medication 20 MILLIGRAM(S): at 07:57

## 2020-03-28 RX ADMIN — Medication 100 MILLIGRAM(S): at 13:33

## 2020-03-28 RX ADMIN — Medication 5 MILLIGRAM(S): at 09:18

## 2020-03-28 RX ADMIN — Medication 1 MILLIGRAM(S): at 13:32

## 2020-03-28 RX ADMIN — Medication 130 MILLIGRAM(S): at 18:42

## 2020-03-28 RX ADMIN — DEXMEDETOMIDINE HYDROCHLORIDE IN 0.9% SODIUM CHLORIDE 5.39 MICROGRAM(S)/KG/HR: 4 INJECTION INTRAVENOUS at 13:18

## 2020-03-28 RX ADMIN — Medication 1.5 MILLIGRAM(S): at 01:38

## 2020-03-28 RX ADMIN — Medication 130 MILLIGRAM(S): at 23:54

## 2020-03-28 RX ADMIN — Medication 2 MILLIGRAM(S): at 08:27

## 2020-03-28 RX ADMIN — Medication 2 MILLIGRAM(S): at 07:57

## 2020-03-28 RX ADMIN — OLANZAPINE 10 MILLIGRAM(S): 15 TABLET, FILM COATED ORAL at 10:20

## 2020-03-28 RX ADMIN — Medication 1 GRAM(S): at 13:31

## 2020-03-28 RX ADMIN — Medication 2 MILLIGRAM(S): at 10:01

## 2020-03-28 RX ADMIN — DEXMEDETOMIDINE HYDROCHLORIDE IN 0.9% SODIUM CHLORIDE 5.39 MICROGRAM(S)/KG/HR: 4 INJECTION INTRAVENOUS at 16:00

## 2020-03-28 NOTE — PROGRESS NOTE ADULT - SUBJECTIVE AND OBJECTIVE BOX
Patient is a 54y old  Male who presents with a chief complaint of Abdominal Pain (26 Mar 2020 13:09)      SUBJECTIVE / OVERNIGHT EVENTS: Patient seen and examined, confused, agitated.       T(C): 36.3 (03-28-20 @ 13:05), Max: 37 (03-28-20 @ 06:02)  HR: 86 (03-28-20 @ 12:40) (55 - 110)  BP: 144/94 (03-28-20 @ 12:40) (117/72 - 144/94)  RR: 18 (03-28-20 @ 12:40) (17 - 24)  SpO2: 100% (03-28-20 @ 12:08) (98% - 100%)    MEDICATIONS  (STANDING):  atorvastatin 20 milliGRAM(s) Oral at bedtime  dexMEDEtomidine Infusion 0.3 MICROgram(s)/kG/Hr (5.39 mL/Hr) IV Continuous <Continuous>  dicyclomine 20 milliGRAM(s) Oral three times a day before meals  folic acid 1 milliGRAM(s) Oral daily  LORazepam   Injectable 1.5 milliGRAM(s) IV Push every 4 hours  LORazepam   Injectable 1 milliGRAM(s) IV Push every 4 hours  LORazepam   Injectable   IV Push   metoprolol tartrate 25 milliGRAM(s) Oral every 12 hours  multivitamin 1 Tablet(s) Oral daily  pantoprazole    Tablet 40 milliGRAM(s) Oral before breakfast  PHENobarbital Injectable 130 milliGRAM(s) IV Push every 6 hours  sodium chloride 0.9%. 1000 milliLiter(s) (100 mL/Hr) IV Continuous <Continuous>  sodium phosphate IVPB 15 milliMole(s) IV Intermittent once  sucralfate suspension 1 Gram(s) Oral four times a day  thiamine 100 milliGRAM(s) Oral daily    MEDICATIONS  (PRN):  acetaminophen   Tablet .. 650 milliGRAM(s) Oral every 4 hours PRN Temp greater or equal to 38C (100.4F), Mild Pain (1 - 3)  LORazepam   Injectable 2 milliGRAM(s) IV Push every 1 hour PRN Symptom-triggered: each CIWA -Ar score 8 or GREATER      CAPILLARY BLOOD GLUCOSE  PHYSICAL EXAM:  GENERAL: NAD, well-developed  HEAD:  Atraumatic, Normocephalic  EYES: EOMI, conjunctiva and sclera clear  NECK: Supple, No JVD  CHEST/LUNG: Clear to auscultation bilaterally; No wheeze  HEART: Regular rate and rhythm; No murmurs, rubs, or gallops  ABDOMEN: Soft, Nontender, Nondistended; Bowel sounds present  EXTREMITIES:  2+ Peripheral Pulses, No clubbing, cyanosis, or edema  PSYCH: AAOx1 confused   NEUROLOGY: non-focal  SKIN: No rashes or lesions                                        12.1   2.74  )-----------( 88       ( 27 Mar 2020 06:48 )             38.1           LIVER FUNCTIONS - ( 28 Mar 2020 10:50 )  Alb: 3.4 g/dL / Pro: 7.3 gm/dL / ALK PHOS: 57 U/L / ALT: 41 U/L / AST: 44 U/L / GGT: x             137|107|4<130  3.5|18|1.09  9.0,1.8,1.5  03-28 @ 10:50          RADIOLOGY & ADDITIONAL TESTS:    Imaging Personally Reviewed:    Consultant(s) Notes Reviewed:      Care Discussed with Consultants/Other Providers:

## 2020-03-28 NOTE — PROGRESS NOTE ADULT - PROBLEM SELECTOR PLAN 1
CIWA-20-23, Librium Taper, Valium 10 mg x 1 dose, phenobarb 150 mg   MICU eval.   Thiamine and Folic Acid

## 2020-03-29 LAB
ANION GAP SERPL CALC-SCNC: 10 MMOL/L — SIGNIFICANT CHANGE UP (ref 5–17)
BUN SERPL-MCNC: 6 MG/DL — LOW (ref 7–23)
CALCIUM SERPL-MCNC: 9.1 MG/DL — SIGNIFICANT CHANGE UP (ref 8.5–10.1)
CHLORIDE SERPL-SCNC: 110 MMOL/L — HIGH (ref 96–108)
CO2 SERPL-SCNC: 23 MMOL/L — SIGNIFICANT CHANGE UP (ref 22–31)
CREAT SERPL-MCNC: 0.58 MG/DL — SIGNIFICANT CHANGE UP (ref 0.5–1.3)
GLUCOSE SERPL-MCNC: 104 MG/DL — HIGH (ref 70–99)
HCT VFR BLD CALC: 41.5 % — SIGNIFICANT CHANGE UP (ref 39–50)
HGB BLD-MCNC: 13.4 G/DL — SIGNIFICANT CHANGE UP (ref 13–17)
MAGNESIUM SERPL-MCNC: 1.8 MG/DL — SIGNIFICANT CHANGE UP (ref 1.6–2.6)
MCHC RBC-ENTMCNC: 27.9 PG — SIGNIFICANT CHANGE UP (ref 27–34)
MCHC RBC-ENTMCNC: 32.3 GM/DL — SIGNIFICANT CHANGE UP (ref 32–36)
MCV RBC AUTO: 86.5 FL — SIGNIFICANT CHANGE UP (ref 80–100)
NRBC # BLD: 0 /100 WBCS — SIGNIFICANT CHANGE UP (ref 0–0)
PHOSPHATE SERPL-MCNC: 3.5 MG/DL — SIGNIFICANT CHANGE UP (ref 2.5–4.5)
PLATELET # BLD AUTO: 95 K/UL — LOW (ref 150–400)
POTASSIUM SERPL-MCNC: 3.7 MMOL/L — SIGNIFICANT CHANGE UP (ref 3.5–5.3)
POTASSIUM SERPL-SCNC: 3.7 MMOL/L — SIGNIFICANT CHANGE UP (ref 3.5–5.3)
RBC # BLD: 4.8 M/UL — SIGNIFICANT CHANGE UP (ref 4.2–5.8)
RBC # FLD: 15 % — HIGH (ref 10.3–14.5)
SODIUM SERPL-SCNC: 143 MMOL/L — SIGNIFICANT CHANGE UP (ref 135–145)
WBC # BLD: 5.61 K/UL — SIGNIFICANT CHANGE UP (ref 3.8–10.5)
WBC # FLD AUTO: 5.61 K/UL — SIGNIFICANT CHANGE UP (ref 3.8–10.5)

## 2020-03-29 RX ORDER — PHENOBARBITAL 60 MG
260 TABLET ORAL ONCE
Refills: 0 | Status: DISCONTINUED | OUTPATIENT
Start: 2020-03-29 | End: 2020-03-29

## 2020-03-29 RX ORDER — PHENOBARBITAL 60 MG
130 TABLET ORAL EVERY 6 HOURS
Refills: 0 | Status: DISCONTINUED | OUTPATIENT
Start: 2020-03-29 | End: 2020-03-30

## 2020-03-29 RX ORDER — PHENOBARBITAL 60 MG
260 TABLET ORAL
Refills: 0 | Status: DISCONTINUED | OUTPATIENT
Start: 2020-03-29 | End: 2020-03-30

## 2020-03-29 RX ADMIN — Medication 260 MILLIGRAM(S): at 18:21

## 2020-03-29 RX ADMIN — Medication 130 MILLIGRAM(S): at 06:07

## 2020-03-29 RX ADMIN — PANTOPRAZOLE SODIUM 40 MILLIGRAM(S): 20 TABLET, DELAYED RELEASE ORAL at 18:20

## 2020-03-29 RX ADMIN — Medication 1 GRAM(S): at 18:21

## 2020-03-29 RX ADMIN — ATORVASTATIN CALCIUM 20 MILLIGRAM(S): 80 TABLET, FILM COATED ORAL at 22:23

## 2020-03-29 RX ADMIN — Medication 260 MILLIGRAM(S): at 20:28

## 2020-03-29 RX ADMIN — Medication 260 MILLIGRAM(S): at 14:45

## 2020-03-29 NOTE — PROGRESS NOTE ADULT - SUBJECTIVE AND OBJECTIVE BOX
Patient is a 54y old  Male who presents with a chief complaint of Abdominal Pain (26 Mar 2020 13:09)      SUBJECTIVE / OVERNIGHT EVENTS: Patient seen and examined, confused, agitated.       ICU Vital Signs Last 24 Hrs  T(C): 36.2 (29 Mar 2020 08:46), Max: 37.2 (28 Mar 2020 23:00)  T(F): 97.2 (29 Mar 2020 08:46), Max: 99 (28 Mar 2020 23:00)  HR: 64 (29 Mar 2020 10:00) (52 - 105)  BP: 130/90 (29 Mar 2020 10:00) (113/91 - 150/97)  BP(mean): 94 (29 Mar 2020 08:05) (94 - 116)  RR: 18 (28 Mar 2020 21:00) (17 - 29)  SpO2: 97% (29 Mar 2020 10:00) (97% - 99%)    MEDICATIONS  (STANDING):  atorvastatin 20 milliGRAM(s) Oral at bedtime  dexMEDEtomidine Infusion 0.3 MICROgram(s)/kG/Hr (5.39 mL/Hr) IV Continuous <Continuous>  dicyclomine 20 milliGRAM(s) Oral three times a day before meals  folic acid 1 milliGRAM(s) Oral daily  metoprolol tartrate 25 milliGRAM(s) Oral every 12 hours  multivitamin 1 Tablet(s) Oral daily  pantoprazole    Tablet 40 milliGRAM(s) Oral before breakfast  PHENobarbital Injectable 260 milliGRAM(s) IV Push four times a day  sodium phosphate IVPB 15 milliMole(s) IV Intermittent once  sucralfate suspension 1 Gram(s) Oral four times a day  thiamine 100 milliGRAM(s) Oral daily    MEDICATIONS  (PRN):  acetaminophen   Tablet .. 650 milliGRAM(s) Oral every 4 hours PRN Temp greater or equal to 38C (100.4F), Mild Pain (1 - 3)      CAPILLARY BLOOD GLUCOSE  PHYSICAL EXAM:  GENERAL: NAD, well-developed  HEAD:  Atraumatic, Normocephalic  EYES: EOMI, conjunctiva and sclera clear  NECK: Supple, No JVD  CHEST/LUNG: Clear to auscultation bilaterally; No wheeze  HEART: Regular rate and rhythm; No murmurs, rubs, or gallops  ABDOMEN: Soft, Nontender, Nondistended; Bowel sounds present  EXTREMITIES:  2+ Peripheral Pulses, No clubbing, cyanosis, or edema  PSYCH: AAOx1 confused   NEUROLOGY: non-focal  SKIN: No rashes or lesions                                           13.4   5.61  )-----------( 95       ( 29 Mar 2020 07:37 )             41.5     03-29    143  |  110<H>  |  6<L>  ----------------------------<  104<H>  3.7   |  23  |  0.58    Ca    9.1      29 Mar 2020 07:37  Phos  3.5     03-29  Mg     1.8     03-29    TPro  7.3  /  Alb  3.4  /  TBili  1.0  /  DBili  .21<H>  /  AST  44<H>  /  ALT  41  /  AlkPhos  57  03-28

## 2020-03-29 NOTE — PROGRESS NOTE ADULT - ASSESSMENT
54 year old male, PMH Etoh abuse, HTN, gastritis presents to the ED with complains of abdominal pain and vomiting that started 3 days ago. Patient reports pain in 8/10, constant, dull and located in epigastric area. Denies chest pain, SOB, palpitations, cough or diarrhea. Patient reports he did not have alcohol to drink in three weeks. BAL on admission is 369.   IMPROVE VTE Individual Risk Assessment      -changed phenobarb from 130q6 to 260q6  -cont precedex  -d/c ativan

## 2020-03-30 LAB
ALBUMIN SERPL ELPH-MCNC: 3.4 G/DL — SIGNIFICANT CHANGE UP (ref 3.3–5)
ALP SERPL-CCNC: 65 U/L — SIGNIFICANT CHANGE UP (ref 40–120)
ALT FLD-CCNC: 34 U/L — SIGNIFICANT CHANGE UP (ref 12–78)
ANION GAP SERPL CALC-SCNC: 11 MMOL/L — SIGNIFICANT CHANGE UP (ref 5–17)
AST SERPL-CCNC: 26 U/L — SIGNIFICANT CHANGE UP (ref 15–37)
BASOPHILS # BLD AUTO: 0.03 K/UL — SIGNIFICANT CHANGE UP (ref 0–0.2)
BASOPHILS NFR BLD AUTO: 0.5 % — SIGNIFICANT CHANGE UP (ref 0–2)
BILIRUB SERPL-MCNC: 0.8 MG/DL — SIGNIFICANT CHANGE UP (ref 0.2–1.2)
BUN SERPL-MCNC: 11 MG/DL — SIGNIFICANT CHANGE UP (ref 7–23)
CALCIUM SERPL-MCNC: 9 MG/DL — SIGNIFICANT CHANGE UP (ref 8.5–10.1)
CHLORIDE SERPL-SCNC: 106 MMOL/L — SIGNIFICANT CHANGE UP (ref 96–108)
CO2 SERPL-SCNC: 23 MMOL/L — SIGNIFICANT CHANGE UP (ref 22–31)
CREAT SERPL-MCNC: 0.96 MG/DL — SIGNIFICANT CHANGE UP (ref 0.5–1.3)
EOSINOPHIL # BLD AUTO: 0.07 K/UL — SIGNIFICANT CHANGE UP (ref 0–0.5)
EOSINOPHIL NFR BLD AUTO: 1.2 % — SIGNIFICANT CHANGE UP (ref 0–6)
GLUCOSE SERPL-MCNC: 94 MG/DL — SIGNIFICANT CHANGE UP (ref 70–99)
HCT VFR BLD CALC: 43.6 % — SIGNIFICANT CHANGE UP (ref 39–50)
HGB BLD-MCNC: 13.8 G/DL — SIGNIFICANT CHANGE UP (ref 13–17)
IMM GRANULOCYTES NFR BLD AUTO: 0.7 % — SIGNIFICANT CHANGE UP (ref 0–1.5)
LYMPHOCYTES # BLD AUTO: 0.8 K/UL — LOW (ref 1–3.3)
LYMPHOCYTES # BLD AUTO: 14 % — SIGNIFICANT CHANGE UP (ref 13–44)
MAGNESIUM SERPL-MCNC: 1.8 MG/DL — SIGNIFICANT CHANGE UP (ref 1.6–2.6)
MCHC RBC-ENTMCNC: 27.8 PG — SIGNIFICANT CHANGE UP (ref 27–34)
MCHC RBC-ENTMCNC: 31.7 GM/DL — LOW (ref 32–36)
MCV RBC AUTO: 87.9 FL — SIGNIFICANT CHANGE UP (ref 80–100)
MONOCYTES # BLD AUTO: 0.84 K/UL — SIGNIFICANT CHANGE UP (ref 0–0.9)
MONOCYTES NFR BLD AUTO: 14.7 % — HIGH (ref 2–14)
NEUTROPHILS # BLD AUTO: 3.92 K/UL — SIGNIFICANT CHANGE UP (ref 1.8–7.4)
NEUTROPHILS NFR BLD AUTO: 68.9 % — SIGNIFICANT CHANGE UP (ref 43–77)
NRBC # BLD: 0 /100 WBCS — SIGNIFICANT CHANGE UP (ref 0–0)
PHOSPHATE SERPL-MCNC: 3.6 MG/DL — SIGNIFICANT CHANGE UP (ref 2.5–4.5)
PLATELET # BLD AUTO: 118 K/UL — LOW (ref 150–400)
POTASSIUM SERPL-MCNC: 3.3 MMOL/L — LOW (ref 3.5–5.3)
POTASSIUM SERPL-SCNC: 3.3 MMOL/L — LOW (ref 3.5–5.3)
PROT SERPL-MCNC: 7.9 GM/DL — SIGNIFICANT CHANGE UP (ref 6–8.3)
RBC # BLD: 4.96 M/UL — SIGNIFICANT CHANGE UP (ref 4.2–5.8)
RBC # FLD: 15.7 % — HIGH (ref 10.3–14.5)
SODIUM SERPL-SCNC: 140 MMOL/L — SIGNIFICANT CHANGE UP (ref 135–145)
WBC # BLD: 5.7 K/UL — SIGNIFICANT CHANGE UP (ref 3.8–10.5)
WBC # FLD AUTO: 5.7 K/UL — SIGNIFICANT CHANGE UP (ref 3.8–10.5)

## 2020-03-30 RX ORDER — POTASSIUM CHLORIDE 20 MEQ
40 PACKET (EA) ORAL ONCE
Refills: 0 | Status: COMPLETED | OUTPATIENT
Start: 2020-03-30 | End: 2020-03-30

## 2020-03-30 RX ORDER — PHENOBARBITAL 60 MG
130 TABLET ORAL
Refills: 0 | Status: DISCONTINUED | OUTPATIENT
Start: 2020-03-30 | End: 2020-04-02

## 2020-03-30 RX ORDER — PHENOBARBITAL 60 MG
130 TABLET ORAL EVERY 6 HOURS
Refills: 0 | Status: DISCONTINUED | OUTPATIENT
Start: 2020-03-30 | End: 2020-04-04

## 2020-03-30 RX ORDER — PHENOBARBITAL 60 MG
130 TABLET ORAL
Refills: 0 | Status: DISCONTINUED | OUTPATIENT
Start: 2020-03-30 | End: 2020-03-30

## 2020-03-30 RX ADMIN — Medication 62.5 MILLIMOLE(S): at 00:16

## 2020-03-30 RX ADMIN — Medication 130 MILLIGRAM(S): at 18:42

## 2020-03-30 RX ADMIN — Medication 40 MILLIEQUIVALENT(S): at 16:01

## 2020-03-30 RX ADMIN — Medication 1 TABLET(S): at 13:00

## 2020-03-30 RX ADMIN — Medication 1 GRAM(S): at 18:43

## 2020-03-30 RX ADMIN — Medication 130 MILLIGRAM(S): at 12:59

## 2020-03-30 RX ADMIN — Medication 650 MILLIGRAM(S): at 15:32

## 2020-03-30 RX ADMIN — Medication 1 GRAM(S): at 00:16

## 2020-03-30 RX ADMIN — Medication 1 GRAM(S): at 06:53

## 2020-03-30 RX ADMIN — Medication 1 GRAM(S): at 13:00

## 2020-03-30 RX ADMIN — DEXMEDETOMIDINE HYDROCHLORIDE IN 0.9% SODIUM CHLORIDE 5.39 MICROGRAM(S)/KG/HR: 4 INJECTION INTRAVENOUS at 13:06

## 2020-03-30 RX ADMIN — Medication 1 MILLIGRAM(S): at 13:00

## 2020-03-30 RX ADMIN — Medication 260 MILLIGRAM(S): at 00:16

## 2020-03-30 RX ADMIN — Medication 130 MILLIGRAM(S): at 20:54

## 2020-03-30 NOTE — CHART NOTE - NSCHARTNOTEFT_GEN_A_CORE
Mr. Degroot is a 55yo M with PMH of EtOH abuse, HTN, HLD, gastritis, PUD who presented to the ED on 3/25/20 with alcohol intoxication, epigastric pain and vomiting x 3 days. Pt stated he had not drunk in 3 weeks, BAL in ED was 369. Pt was admitted to floor on 3/26/20 for management on CIWA Librium protocol. Treated with sucralfate and protonix for acute non-hemorrhagic alcoholic gastritis. Switched from librium to phenobarbital and added precedex gtt for increased agitation and transferred to ICU on 3/28/20. As of today pt is off precedex gtt and phenobarb was decreased from 260mg q6h to 130mg q6h.    --Monitor vitals, lytes, tele.  --Continue CIWA protocol with phenobarb 130 q6h.  --Continue folate and MVI.  --Continue protonix and sucralfate for gastritis/PUD.  --Continue statin  --SCDs for DVT ppx    Case discussed with ICU attending Dr. Akbar. Verbal handoff given to hospitalist Dr. Green.  Pt currently stable for transfer to telemetry. Mr. Degroot is a 55yo M with PMH of EtOH abuse, HTN, HLD, gastritis, PUD who presented to the ED on 3/25/20 with alcohol intoxication, epigastric pain and vomiting x 3 days. Pt stated he had not drunk in 3 weeks, BAL in ED was 369. Pt was admitted to floor on 3/26/20 for management on CIWA Librium protocol. Treated with sucralfate and protonix for acute non-hemorrhagic alcoholic gastritis. Switched from librium to phenobarbital and added precedex gtt for increased agitation and transferred to ICU on 3/28/20. As of today pt is off precedex gtt and phenobarb was decreased from 260mg q6h to 130mg q6h.    --Monitor vitals, lytes, tele.  --Continue CIWA protocol with phenobarb 130 q6h.  --Continue folate and MVI.  --Continue protonix and sucralfate for gastritis/PUD.  --Continue statin  --SCDs for DVT ppx    Case discussed with ICU attending Dr. Akbar. Verbal handoff given to hospitalist Dr. Green.  Pt currently stable for transfer to telemetry.      Attending Attestation:    Pt weaned off precedex this AM. Awake and comfortable, CIWA w/in acceptable range  Hemodynamically stable  Cont phenobarb.  O2 sat good and protecting airway  Transfer to floors

## 2020-03-31 LAB
ALBUMIN SERPL ELPH-MCNC: 3.1 G/DL — LOW (ref 3.3–5)
ALP SERPL-CCNC: 51 U/L — SIGNIFICANT CHANGE UP (ref 40–120)
ALT FLD-CCNC: 31 U/L — SIGNIFICANT CHANGE UP (ref 12–78)
ANION GAP SERPL CALC-SCNC: 10 MMOL/L — SIGNIFICANT CHANGE UP (ref 5–17)
AST SERPL-CCNC: 25 U/L — SIGNIFICANT CHANGE UP (ref 15–37)
BASOPHILS # BLD AUTO: 0.02 K/UL — SIGNIFICANT CHANGE UP (ref 0–0.2)
BASOPHILS NFR BLD AUTO: 0.4 % — SIGNIFICANT CHANGE UP (ref 0–2)
BILIRUB SERPL-MCNC: 0.6 MG/DL — SIGNIFICANT CHANGE UP (ref 0.2–1.2)
BUN SERPL-MCNC: 12 MG/DL — SIGNIFICANT CHANGE UP (ref 7–23)
CALCIUM SERPL-MCNC: 9 MG/DL — SIGNIFICANT CHANGE UP (ref 8.5–10.1)
CHLORIDE SERPL-SCNC: 110 MMOL/L — HIGH (ref 96–108)
CO2 SERPL-SCNC: 24 MMOL/L — SIGNIFICANT CHANGE UP (ref 22–31)
CREAT SERPL-MCNC: 0.66 MG/DL — SIGNIFICANT CHANGE UP (ref 0.5–1.3)
EOSINOPHIL # BLD AUTO: 0.09 K/UL — SIGNIFICANT CHANGE UP (ref 0–0.5)
EOSINOPHIL NFR BLD AUTO: 1.8 % — SIGNIFICANT CHANGE UP (ref 0–6)
GLUCOSE SERPL-MCNC: 82 MG/DL — SIGNIFICANT CHANGE UP (ref 70–99)
HCT VFR BLD CALC: 38.2 % — LOW (ref 39–50)
HGB BLD-MCNC: 12.3 G/DL — LOW (ref 13–17)
IMM GRANULOCYTES NFR BLD AUTO: 0.4 % — SIGNIFICANT CHANGE UP (ref 0–1.5)
LYMPHOCYTES # BLD AUTO: 1.02 K/UL — SIGNIFICANT CHANGE UP (ref 1–3.3)
LYMPHOCYTES # BLD AUTO: 20.6 % — SIGNIFICANT CHANGE UP (ref 13–44)
MAGNESIUM SERPL-MCNC: 1.9 MG/DL — SIGNIFICANT CHANGE UP (ref 1.6–2.6)
MCHC RBC-ENTMCNC: 28 PG — SIGNIFICANT CHANGE UP (ref 27–34)
MCHC RBC-ENTMCNC: 32.2 GM/DL — SIGNIFICANT CHANGE UP (ref 32–36)
MCV RBC AUTO: 87 FL — SIGNIFICANT CHANGE UP (ref 80–100)
MONOCYTES # BLD AUTO: 0.74 K/UL — SIGNIFICANT CHANGE UP (ref 0–0.9)
MONOCYTES NFR BLD AUTO: 15 % — HIGH (ref 2–14)
NEUTROPHILS # BLD AUTO: 3.05 K/UL — SIGNIFICANT CHANGE UP (ref 1.8–7.4)
NEUTROPHILS NFR BLD AUTO: 61.8 % — SIGNIFICANT CHANGE UP (ref 43–77)
NRBC # BLD: 0 /100 WBCS — SIGNIFICANT CHANGE UP (ref 0–0)
PHOSPHATE SERPL-MCNC: 2.6 MG/DL — SIGNIFICANT CHANGE UP (ref 2.5–4.5)
PLATELET # BLD AUTO: 151 K/UL — SIGNIFICANT CHANGE UP (ref 150–400)
POTASSIUM SERPL-MCNC: 3.1 MMOL/L — LOW (ref 3.5–5.3)
POTASSIUM SERPL-SCNC: 3.1 MMOL/L — LOW (ref 3.5–5.3)
PROT SERPL-MCNC: 7.7 GM/DL — SIGNIFICANT CHANGE UP (ref 6–8.3)
RBC # BLD: 4.39 M/UL — SIGNIFICANT CHANGE UP (ref 4.2–5.8)
RBC # FLD: 15.8 % — HIGH (ref 10.3–14.5)
SODIUM SERPL-SCNC: 144 MMOL/L — SIGNIFICANT CHANGE UP (ref 135–145)
WBC # BLD: 4.94 K/UL — SIGNIFICANT CHANGE UP (ref 3.8–10.5)
WBC # FLD AUTO: 4.94 K/UL — SIGNIFICANT CHANGE UP (ref 3.8–10.5)

## 2020-03-31 PROCEDURE — 99233 SBSQ HOSP IP/OBS HIGH 50: CPT

## 2020-03-31 RX ORDER — THIAMINE MONONITRATE (VIT B1) 100 MG
100 TABLET ORAL DAILY
Refills: 0 | Status: DISCONTINUED | OUTPATIENT
Start: 2020-03-31 | End: 2020-04-14

## 2020-03-31 RX ORDER — POTASSIUM CHLORIDE 20 MEQ
40 PACKET (EA) ORAL ONCE
Refills: 0 | Status: COMPLETED | OUTPATIENT
Start: 2020-03-31 | End: 2020-03-31

## 2020-03-31 RX ADMIN — Medication 40 MILLIEQUIVALENT(S): at 11:21

## 2020-03-31 RX ADMIN — Medication 1 TABLET(S): at 11:20

## 2020-03-31 RX ADMIN — Medication 130 MILLIGRAM(S): at 18:54

## 2020-03-31 RX ADMIN — PANTOPRAZOLE SODIUM 40 MILLIGRAM(S): 20 TABLET, DELAYED RELEASE ORAL at 05:41

## 2020-03-31 RX ADMIN — Medication 130 MILLIGRAM(S): at 00:06

## 2020-03-31 RX ADMIN — Medication 1 MILLIGRAM(S): at 11:20

## 2020-03-31 RX ADMIN — Medication 1 GRAM(S): at 00:07

## 2020-03-31 RX ADMIN — Medication 130 MILLIGRAM(S): at 11:21

## 2020-03-31 RX ADMIN — Medication 130 MILLIGRAM(S): at 22:24

## 2020-03-31 RX ADMIN — Medication 1 GRAM(S): at 18:55

## 2020-03-31 RX ADMIN — Medication 1 GRAM(S): at 11:21

## 2020-03-31 RX ADMIN — Medication 100 MILLIGRAM(S): at 18:54

## 2020-03-31 RX ADMIN — Medication 130 MILLIGRAM(S): at 05:41

## 2020-03-31 RX ADMIN — ATORVASTATIN CALCIUM 20 MILLIGRAM(S): 80 TABLET, FILM COATED ORAL at 22:24

## 2020-03-31 RX ADMIN — Medication 1 GRAM(S): at 05:40

## 2020-03-31 NOTE — PROGRESS NOTE ADULT - SUBJECTIVE AND OBJECTIVE BOX
Patient is a 54y old  Male who presents with a chief complaint of Abdominal Pain (29 Mar 2020 12:32)      INTERVAL HPI/ OVERNIGHT EVENTS: Pt was seen and examined at bedside today, No significant overnight events, pt is confused, interview is unreliable.      MEDICATIONS  (STANDING):  atorvastatin 20 milliGRAM(s) Oral at bedtime  dicyclomine 20 milliGRAM(s) Oral three times a day before meals  folic acid 1 milliGRAM(s) Oral daily  multivitamin 1 Tablet(s) Oral daily  pantoprazole    Tablet 40 milliGRAM(s) Oral before breakfast  PHENobarbital Injectable 130 milliGRAM(s) IntraMuscular four times a day  sucralfate suspension 1 Gram(s) Oral four times a day    MEDICATIONS  (PRN):  acetaminophen   Tablet .. 650 milliGRAM(s) Oral every 4 hours PRN Temp greater or equal to 38C (100.4F), Mild Pain (1 - 3)  PHENobarbital Injectable 130 milliGRAM(s) IntraMuscular every 6 hours PRN agitation      Allergies    No Known Allergies    Intolerances        REVIEW OF SYSTEMS:    Unable to examine due to [x ] Encephalopathy [ ] Advanced Dementia [ ] Expressive Aphasia [ ] Non-verbal patient    CONSTITUTIONAL: No fever, NO generalized weakness/Fatigue, No weight loss  EYES: No eye pain, visual disturbances, or discharge  ENMT:  No difficulty hearing, tinnitus, vertigo; No sinus or throat pain  NECK: No pain or stiffness  RESPIRATORY: No shortness of breath,  cough, wheezing, sputum or hemoptysis   CARDIOVASCULAR: No chest pain, palpitations, or leg swelling  GASTROINTESTINAL: No abdominal pain. No nausea, vomiting, diarrhea or constipation. No melena or hematochezia.  GENITOURINARY: No dysuria, frequency, hematuria, or incontinence  NEUROLOGICAL: No headaches, Dizziness, memory loss, loss of strength, numbness, or tremors  SKIN: No itching, burning, rashes, or lesions   MUSCULOSKELETAL: No joint pain or swelling; No muscle, back, or extremity pain  PSYCHIATRIC: No depression, anxiety, mood swings, or difficulty sleeping  HEME/LYMPH: No easy bruising, or bleeding gums      Vital Signs Last 24 Hrs  T(C): 36.2 (31 Mar 2020 11:22), Max: 37.4 (30 Mar 2020 17:01)  T(F): 97.2 (31 Mar 2020 11:22), Max: 99.3 (30 Mar 2020 17:01)  HR: 93 (31 Mar 2020 11:22) (91 - 100)  BP: 119/60 (31 Mar 2020 11:22) (91/55 - 123/80)  BP(mean): 87 (31 Mar 2020 04:55) (81 - 98)  RR: 18 (31 Mar 2020 11:22) (18 - 23)  SpO2: 95% (31 Mar 2020 04:55) (93% - 96%)    PHYSICAL EXAM:  GENERAL: NAD, well-developed, well-groomed  HEAD:  Atraumatic, Normocephalic  EYES: conjunctiva and sclera clear  ENMT: Moist mucous membranes  NECK: Supple, No JVD, Normal thyroid  CHEST/LUNG: Clear to Auscultation bilaterally; No rales, rhonchi, wheezing, or rubs  HEART: Regular rate and rhythm; No murmurs, rubs, or gallops  ABDOMEN: Soft, Nontender, Nondistended; Bowel sounds present  EXTREMITIES:  2+ Peripheral Pulses, No clubbing, cyanosis, or edema  SKIN: No rashes or lesions  NERVOUS SYSTEM:  awake, tremulous and confused     LABS:                        12.3   4.94  )-----------( 151      ( 31 Mar 2020 06:50 )             38.2     03-31    144  |  110<H>  |  12  ----------------------------<  82  3.1<L>   |  24  |  0.66    Ca    9.0      31 Mar 2020 06:50  Phos  2.6     03-31  Mg     1.9     03-31    TPro  7.7  /  Alb  3.1<L>  /  TBili  0.6  /  DBili  x   /  AST  25  /  ALT  31  /  AlkPhos  51  03-31        CAPILLARY BLOOD GLUCOSE              RADIOLOGY & ADDITIONAL TESTS:          Imaging Personally Reviewed:  [ ] YES  [ ] NO    Consultant(s) Notes Reviewed:  [ ] YES  [ ] NO    Care Discussed with Consultants/Other Providers [x ] YES  [ ] NO

## 2020-03-31 NOTE — PROGRESS NOTE ADULT - PROBLEM SELECTOR PLAN 1
s/p ICU course for severe withdrawals   pt is still confused and tremulous   cont w/ phenobarb, Thiamine and Folic Acid

## 2020-03-31 NOTE — PROGRESS NOTE ADULT - ASSESSMENT
Mr. Degroot is a 53yo M with PMH of EtOH abuse, HTN, HLD, gastritis, PUD who presented to the ED on 3/25/20 with alcohol intoxication, epigastric pain and vomiting x 3 days. Pt stated he had not drunk in 3 weeks, BAL in ED was 369. Pt was admitted to floor on 3/26/20 for management on CIWA Librium protocol. Treated with sucralfate and protonix for acute non-hemorrhagic alcoholic gastritis. Switched from librium to phenobarbital and added precedex gtt for increased agitation and transferred to ICU on 3/28/20. As of today pt is off precedex gtt and phenobarb was decreased from 260mg q6h to 130mg q6h.    --Monitor vitals, lytes, tele.  --Continue CIWA protocol with phenobarb 130 q6h.  --Continue folate and MVI.  --Continue protonix and sucralfate for gastritis/PUD.  --Continue statin  --SCDs for DVT ppx

## 2020-04-01 DIAGNOSIS — R50.9 FEVER, UNSPECIFIED: ICD-10-CM

## 2020-04-01 LAB
ANION GAP SERPL CALC-SCNC: 13 MMOL/L — SIGNIFICANT CHANGE UP (ref 5–17)
BUN SERPL-MCNC: 8 MG/DL — SIGNIFICANT CHANGE UP (ref 7–23)
CALCIUM SERPL-MCNC: 9.4 MG/DL — SIGNIFICANT CHANGE UP (ref 8.5–10.1)
CHLORIDE SERPL-SCNC: 112 MMOL/L — HIGH (ref 96–108)
CO2 SERPL-SCNC: 20 MMOL/L — LOW (ref 22–31)
CREAT SERPL-MCNC: 0.72 MG/DL — SIGNIFICANT CHANGE UP (ref 0.5–1.3)
D DIMER BLD IA.RAPID-MCNC: 727 NG/ML DDU — HIGH
FERRITIN SERPL-MCNC: 353 NG/ML — SIGNIFICANT CHANGE UP (ref 30–400)
FLU A RESULT: SIGNIFICANT CHANGE UP
FLU A RESULT: SIGNIFICANT CHANGE UP
FLUAV AG NPH QL: SIGNIFICANT CHANGE UP
FLUBV AG NPH QL: SIGNIFICANT CHANGE UP
GLUCOSE SERPL-MCNC: 87 MG/DL — SIGNIFICANT CHANGE UP (ref 70–99)
HCT VFR BLD CALC: 43.5 % — SIGNIFICANT CHANGE UP (ref 39–50)
HGB BLD-MCNC: 13.7 G/DL — SIGNIFICANT CHANGE UP (ref 13–17)
MAGNESIUM SERPL-MCNC: 2 MG/DL — SIGNIFICANT CHANGE UP (ref 1.6–2.6)
MCHC RBC-ENTMCNC: 28.4 PG — SIGNIFICANT CHANGE UP (ref 27–34)
MCHC RBC-ENTMCNC: 31.5 GM/DL — LOW (ref 32–36)
MCV RBC AUTO: 90.1 FL — SIGNIFICANT CHANGE UP (ref 80–100)
NRBC # BLD: 0 /100 WBCS — SIGNIFICANT CHANGE UP (ref 0–0)
PHOSPHATE SERPL-MCNC: 3.4 MG/DL — SIGNIFICANT CHANGE UP (ref 2.5–4.5)
PLATELET # BLD AUTO: 207 K/UL — SIGNIFICANT CHANGE UP (ref 150–400)
POTASSIUM SERPL-MCNC: 3.9 MMOL/L — SIGNIFICANT CHANGE UP (ref 3.5–5.3)
POTASSIUM SERPL-SCNC: 3.9 MMOL/L — SIGNIFICANT CHANGE UP (ref 3.5–5.3)
RBC # BLD: 4.83 M/UL — SIGNIFICANT CHANGE UP (ref 4.2–5.8)
RBC # FLD: 15.7 % — HIGH (ref 10.3–14.5)
RSV RESULT: SIGNIFICANT CHANGE UP
RSV RNA RESP QL NAA+PROBE: SIGNIFICANT CHANGE UP
SODIUM SERPL-SCNC: 145 MMOL/L — SIGNIFICANT CHANGE UP (ref 135–145)
WBC # BLD: 3.98 K/UL — SIGNIFICANT CHANGE UP (ref 3.8–10.5)
WBC # FLD AUTO: 3.98 K/UL — SIGNIFICANT CHANGE UP (ref 3.8–10.5)

## 2020-04-01 PROCEDURE — 71045 X-RAY EXAM CHEST 1 VIEW: CPT | Mod: 26

## 2020-04-01 PROCEDURE — 36481 INSERTION OF CATHETER VEIN: CPT

## 2020-04-01 PROCEDURE — 76937 US GUIDE VASCULAR ACCESS: CPT | Mod: 26

## 2020-04-01 PROCEDURE — 99233 SBSQ HOSP IP/OBS HIGH 50: CPT

## 2020-04-01 RX ORDER — METOPROLOL TARTRATE 50 MG
25 TABLET ORAL
Refills: 0 | Status: DISCONTINUED | OUTPATIENT
Start: 2020-04-01 | End: 2020-04-11

## 2020-04-01 RX ORDER — ENOXAPARIN SODIUM 100 MG/ML
40 INJECTION SUBCUTANEOUS DAILY
Refills: 0 | Status: DISCONTINUED | OUTPATIENT
Start: 2020-04-01 | End: 2020-04-14

## 2020-04-01 RX ORDER — METOPROLOL TARTRATE 50 MG
5 TABLET ORAL ONCE
Refills: 0 | Status: COMPLETED | OUTPATIENT
Start: 2020-04-01 | End: 2020-04-01

## 2020-04-01 RX ORDER — ACETAMINOPHEN 500 MG
1000 TABLET ORAL ONCE
Refills: 0 | Status: COMPLETED | OUTPATIENT
Start: 2020-04-01 | End: 2020-04-01

## 2020-04-01 RX ADMIN — Medication 650 MILLIGRAM(S): at 11:39

## 2020-04-01 RX ADMIN — Medication 1 MILLIGRAM(S): at 11:39

## 2020-04-01 RX ADMIN — Medication 1 GRAM(S): at 05:17

## 2020-04-01 RX ADMIN — Medication 1 TABLET(S): at 11:39

## 2020-04-01 RX ADMIN — Medication 1 GRAM(S): at 11:39

## 2020-04-01 RX ADMIN — Medication 1 GRAM(S): at 01:17

## 2020-04-01 RX ADMIN — Medication 130 MILLIGRAM(S): at 11:39

## 2020-04-01 RX ADMIN — Medication 100 MILLIGRAM(S): at 11:39

## 2020-04-01 RX ADMIN — Medication 400 MILLIGRAM(S): at 14:32

## 2020-04-01 RX ADMIN — Medication 25 MILLIGRAM(S): at 11:39

## 2020-04-01 RX ADMIN — Medication 130 MILLIGRAM(S): at 05:16

## 2020-04-01 RX ADMIN — Medication 5 MILLIGRAM(S): at 14:33

## 2020-04-01 RX ADMIN — PANTOPRAZOLE SODIUM 40 MILLIGRAM(S): 20 TABLET, DELAYED RELEASE ORAL at 05:17

## 2020-04-01 RX ADMIN — ENOXAPARIN SODIUM 40 MILLIGRAM(S): 100 INJECTION SUBCUTANEOUS at 21:50

## 2020-04-01 NOTE — CHART NOTE - NSCHARTNOTEFT_GEN_A_CORE
Medicine Hospitalist PA    Requested by RN to place an IV heplock in patient. As per RN pt is a difficult stick. Placed IV heplock in right antecub #20 using ultrasound guidance. IV flushed and secured. RN aware.

## 2020-04-01 NOTE — PROGRESS NOTE ADULT - PROBLEM SELECTOR PLAN 6
possible covid due to presumed exposure while in icu being treated for etoh withdrawal   pan culture check cxr

## 2020-04-01 NOTE — PROGRESS NOTE ADULT - ASSESSMENT
Mr. Degroot is a 55yo M with PMH of EtOH abuse, HTN, HLD, gastritis, PUD who presented to the ED on 3/25/20 with alcohol intoxication, epigastric pain and vomiting x 3 days. Pt stated he had not drunk in 3 weeks, BAL in ED was 369. Pt was admitted to floor on 3/26/20 for management on CIWA Librium protocol. Treated with sucralfate and protonix for acute non-hemorrhagic alcoholic gastritis. Switched from librium to phenobarbital and added precedex gtt for increased agitation and transferred to ICU on 3/28/20. As of today pt is off precedex gtt and phenobarb was decreased from 260mg q6h to 130mg q6h.    --Monitor vitals, lytes, tele.  --Continue CIWA protocol with phenobarb 130 q6h.  --Continue folate and MVI.  --Continue protonix and sucralfate for gastritis/PUD.  --Continue statin  --SCDs for DVT ppx

## 2020-04-01 NOTE — PROGRESS NOTE ADULT - SUBJECTIVE AND OBJECTIVE BOX
Patient is a 54y old  Male who presents with a chief complaint of Abdominal Pain (29 Mar 2020 12:32)      INTERVAL HPI/ OVERNIGHT EVENTS: Pt was seen and examined at bedside today, No significant overnight events, pt is confused, interview is unreliable.    MEDICATIONS  (STANDING):  atorvastatin 20 milliGRAM(s) Oral at bedtime  dicyclomine 20 milliGRAM(s) Oral three times a day before meals  folic acid 1 milliGRAM(s) Oral daily  multivitamin 1 Tablet(s) Oral daily  pantoprazole    Tablet 40 milliGRAM(s) Oral before breakfast  PHENobarbital Injectable 130 milliGRAM(s) IntraMuscular four times a day  sucralfate suspension 1 Gram(s) Oral four times a day  thiamine 100 milliGRAM(s) Oral daily    MEDICATIONS  (PRN):  acetaminophen   Tablet .. 650 milliGRAM(s) Oral every 4 hours PRN Temp greater or equal to 38C (100.4F), Mild Pain (1 - 3)  guaiFENesin   Syrup  (Sugar-Free) 100 milliGRAM(s) Oral every 6 hours PRN Cough  PHENobarbital Injectable 130 milliGRAM(s) IntraMuscular every 6 hours PRN agitation      Allergies    No Known Allergies    Intolerances        REVIEW OF SYSTEMS:    Unable to examine due to [x ] Encephalopathy [ ] Advanced Dementia [ ] Expressive Aphasia [ ] Non-verbal patient    PHYSICAL EXAM:  GENERAL: NAD, well-developed, well-groomed  HEAD:  Atraumatic, Normocephalic  EYES: conjunctiva and sclera clear  ENMT: Moist mucous membranes  NECK: Supple, No JVD, Normal thyroid  CHEST/LUNG: Clear to Auscultation bilaterally; No rales, rhonchi, wheezing, or rubs  HEART: Regular rate and rhythm; No murmurs, rubs, or gallops  ABDOMEN: Soft, Nontender, Nondistended; Bowel sounds present  EXTREMITIES:  2+ Peripheral Pulses, No clubbing, cyanosis, or edema  SKIN: No rashes or lesions  NERVOUS SYSTEM:  awake, tremulous and confused     LABS:                        12.3   4.94  )-----------( 151      ( 31 Mar 2020 06:50 )             38.2     03-31    144  |  110<H>  |  12  ----------------------------<  82  3.1<L>   |  24  |  0.66    Ca    9.0      31 Mar 2020 06:50  Phos  2.6     03-31  Mg     1.9     03-31    TPro  7.7  /  Alb  3.1<L>  /  TBili  0.6  /  DBili  x   /  AST  25  /  ALT  31  /  AlkPhos  51  03-31        CAPILLARY BLOOD GLUCOSE              RADIOLOGY & ADDITIONAL TESTS:          Imaging Personally Reviewed:  [ ] YES  [ ] NO    Consultant(s) Notes Reviewed:  [ ] YES  [ ] NO    Care Discussed with Consultants/Other Providers [x ] YES  [ ] NO Patient is a 54y old  Male who presents with a chief complaint of Abdominal Pain (29 Mar 2020 12:32)      INTERVAL HPI/ OVERNIGHT EVENTS: Pt was seen and examined at bedside today, No significant overnight events, pt is confused, interview is unreliable.       fever today was in icu for etoh withdarwal so now will rule out covid due to presumed exposure    MEDICATIONS  (STANDING):  atorvastatin 20 milliGRAM(s) Oral at bedtime  dicyclomine 20 milliGRAM(s) Oral three times a day before meals  folic acid 1 milliGRAM(s) Oral daily  multivitamin 1 Tablet(s) Oral daily  pantoprazole    Tablet 40 milliGRAM(s) Oral before breakfast  PHENobarbital Injectable 130 milliGRAM(s) IntraMuscular four times a day  sucralfate suspension 1 Gram(s) Oral four times a day  thiamine 100 milliGRAM(s) Oral daily    MEDICATIONS  (PRN):  acetaminophen   Tablet .. 650 milliGRAM(s) Oral every 4 hours PRN Temp greater or equal to 38C (100.4F), Mild Pain (1 - 3)  guaiFENesin   Syrup  (Sugar-Free) 100 milliGRAM(s) Oral every 6 hours PRN Cough  PHENobarbital Injectable 130 milliGRAM(s) IntraMuscular every 6 hours PRN agitation      Allergies    No Known Allergies    Intolerances        REVIEW OF SYSTEMS:    Unable to examine due to [x ] Encephalopathy [ ] Advanced Dementia [ ] Expressive Aphasia [ ] Non-verbal patient    PHYSICAL EXAM:  GENERAL: NAD, well-developed, well-groomed  HEAD:  Atraumatic, Normocephalic  EYES: conjunctiva and sclera clear  ENMT: Moist mucous membranes  NECK: Supple, No JVD, Normal thyroid  CHEST/LUNG: Clear to Auscultation bilaterally; No rales, rhonchi, wheezing, or rubs  HEART: Regular rate and rhythm; No murmurs, rubs, or gallops  ABDOMEN: Soft, Nontender, Nondistended; Bowel sounds present  EXTREMITIES:  2+ Peripheral Pulses, No clubbing, cyanosis, or edema  SKIN: No rashes or lesions  NERVOUS SYSTEM:  awake, tremulous and confused     LABS:                        12.3   4.94  )-----------( 151      ( 31 Mar 2020 06:50 )             38.2     03-31    144  |  110<H>  |  12  ----------------------------<  82  3.1<L>   |  24  |  0.66    Ca    9.0      31 Mar 2020 06:50  Phos  2.6     03-31  Mg     1.9     03-31    TPro  7.7  /  Alb  3.1<L>  /  TBili  0.6  /  DBili  x   /  AST  25  /  ALT  31  /  AlkPhos  51  03-31        CAPILLARY BLOOD GLUCOSE              RADIOLOGY & ADDITIONAL TESTS:          Imaging Personally Reviewed:  [ ] YES  [ ] NO    Consultant(s) Notes Reviewed:  [ ] YES  [ ] NO    Care Discussed with Consultants/Other Providers [x ] YES  [ ] NO

## 2020-04-02 DIAGNOSIS — E87.6 HYPOKALEMIA: ICD-10-CM

## 2020-04-02 LAB
ALBUMIN SERPL ELPH-MCNC: 2.8 G/DL — LOW (ref 3.3–5)
ALBUMIN SERPL ELPH-MCNC: 3 G/DL — LOW (ref 3.3–5)
ALP SERPL-CCNC: 47 U/L — SIGNIFICANT CHANGE UP (ref 40–120)
ALP SERPL-CCNC: 50 U/L — SIGNIFICANT CHANGE UP (ref 40–120)
ALT FLD-CCNC: 23 U/L — SIGNIFICANT CHANGE UP (ref 12–78)
ALT FLD-CCNC: 23 U/L — SIGNIFICANT CHANGE UP (ref 12–78)
ANION GAP SERPL CALC-SCNC: 8 MMOL/L — SIGNIFICANT CHANGE UP (ref 5–17)
ANION GAP SERPL CALC-SCNC: 9 MMOL/L — SIGNIFICANT CHANGE UP (ref 5–17)
APTT BLD: 33.5 SEC — SIGNIFICANT CHANGE UP (ref 28.5–37)
AST SERPL-CCNC: 19 U/L — SIGNIFICANT CHANGE UP (ref 15–37)
AST SERPL-CCNC: 21 U/L — SIGNIFICANT CHANGE UP (ref 15–37)
BASE EXCESS BLDA CALC-SCNC: -3.7 MMOL/L — LOW (ref -2–2)
BILIRUB SERPL-MCNC: 0.4 MG/DL — SIGNIFICANT CHANGE UP (ref 0.2–1.2)
BILIRUB SERPL-MCNC: 0.5 MG/DL — SIGNIFICANT CHANGE UP (ref 0.2–1.2)
BLOOD GAS COMMENTS: SIGNIFICANT CHANGE UP
BLOOD GAS COMMENTS: SIGNIFICANT CHANGE UP
BLOOD GAS SOURCE: SIGNIFICANT CHANGE UP
BUN SERPL-MCNC: 13 MG/DL — SIGNIFICANT CHANGE UP (ref 7–23)
BUN SERPL-MCNC: 16 MG/DL — SIGNIFICANT CHANGE UP (ref 7–23)
CALCIUM SERPL-MCNC: 9 MG/DL — SIGNIFICANT CHANGE UP (ref 8.5–10.1)
CALCIUM SERPL-MCNC: 9.6 MG/DL — SIGNIFICANT CHANGE UP (ref 8.5–10.1)
CHLORIDE SERPL-SCNC: 113 MMOL/L — HIGH (ref 96–108)
CHLORIDE SERPL-SCNC: 114 MMOL/L — HIGH (ref 96–108)
CO2 SERPL-SCNC: 22 MMOL/L — SIGNIFICANT CHANGE UP (ref 22–31)
CO2 SERPL-SCNC: 24 MMOL/L — SIGNIFICANT CHANGE UP (ref 22–31)
CREAT SERPL-MCNC: 0.77 MG/DL — SIGNIFICANT CHANGE UP (ref 0.5–1.3)
CREAT SERPL-MCNC: 0.95 MG/DL — SIGNIFICANT CHANGE UP (ref 0.5–1.3)
CRP SERPL-MCNC: 12.22 MG/DL — HIGH (ref 0–0.4)
CRP SERPL-MCNC: 30.14 MG/DL — HIGH (ref 0–0.4)
FERRITIN SERPL-MCNC: 353 NG/ML — SIGNIFICANT CHANGE UP (ref 30–400)
GLUCOSE BLDC GLUCOMTR-MCNC: 106 MG/DL — HIGH (ref 70–99)
GLUCOSE BLDC GLUCOMTR-MCNC: 107 MG/DL — HIGH (ref 70–99)
GLUCOSE BLDC GLUCOMTR-MCNC: 89 MG/DL — SIGNIFICANT CHANGE UP (ref 70–99)
GLUCOSE SERPL-MCNC: 109 MG/DL — HIGH (ref 70–99)
GLUCOSE SERPL-MCNC: 111 MG/DL — HIGH (ref 70–99)
HCO3 BLDA-SCNC: 23 MMOL/L — SIGNIFICANT CHANGE UP (ref 21–29)
HCT VFR BLD CALC: 38.9 % — LOW (ref 39–50)
HCT VFR BLD CALC: 43.2 % — SIGNIFICANT CHANGE UP (ref 39–50)
HGB BLD-MCNC: 12.1 G/DL — LOW (ref 13–17)
HGB BLD-MCNC: 13.7 G/DL — SIGNIFICANT CHANGE UP (ref 13–17)
HOROWITZ INDEX BLDA+IHG-RTO: 100 — SIGNIFICANT CHANGE UP
INR BLD: 1.31 RATIO — HIGH (ref 0.88–1.16)
LDH SERPL L TO P-CCNC: 257 U/L — HIGH (ref 50–242)
LDH SERPL L TO P-CCNC: 339 U/L — HIGH (ref 50–242)
MAGNESIUM SERPL-MCNC: 1.7 MG/DL — SIGNIFICANT CHANGE UP (ref 1.6–2.6)
MCHC RBC-ENTMCNC: 27.5 PG — SIGNIFICANT CHANGE UP (ref 27–34)
MCHC RBC-ENTMCNC: 28 PG — SIGNIFICANT CHANGE UP (ref 27–34)
MCHC RBC-ENTMCNC: 31.1 GM/DL — LOW (ref 32–36)
MCHC RBC-ENTMCNC: 31.7 GM/DL — LOW (ref 32–36)
MCV RBC AUTO: 88.3 FL — SIGNIFICANT CHANGE UP (ref 80–100)
MCV RBC AUTO: 88.4 FL — SIGNIFICANT CHANGE UP (ref 80–100)
NRBC # BLD: 0 /100 WBCS — SIGNIFICANT CHANGE UP (ref 0–0)
NRBC # BLD: 0 /100 WBCS — SIGNIFICANT CHANGE UP (ref 0–0)
PCO2 BLDA: 49 MMHG — HIGH (ref 32–46)
PH BLD: 7.29 — LOW (ref 7.35–7.45)
PHOSPHATE SERPL-MCNC: 3.3 MG/DL — SIGNIFICANT CHANGE UP (ref 2.5–4.5)
PLATELET # BLD AUTO: 259 K/UL — SIGNIFICANT CHANGE UP (ref 150–400)
PLATELET # BLD AUTO: 341 K/UL — SIGNIFICANT CHANGE UP (ref 150–400)
PO2 BLDA: 141 MMHG — HIGH (ref 74–108)
POTASSIUM SERPL-MCNC: 3.2 MMOL/L — LOW (ref 3.5–5.3)
POTASSIUM SERPL-MCNC: 3.4 MMOL/L — LOW (ref 3.5–5.3)
POTASSIUM SERPL-SCNC: 3.2 MMOL/L — LOW (ref 3.5–5.3)
POTASSIUM SERPL-SCNC: 3.4 MMOL/L — LOW (ref 3.5–5.3)
PROCALCITONIN SERPL-MCNC: 0.3 NG/ML — HIGH (ref 0.02–0.1)
PROT SERPL-MCNC: 7.9 GM/DL — SIGNIFICANT CHANGE UP (ref 6–8.3)
PROT SERPL-MCNC: 8.5 GM/DL — HIGH (ref 6–8.3)
PROTHROM AB SERPL-ACNC: 14.8 SEC — HIGH (ref 10–12.9)
RAPID RVP RESULT: SIGNIFICANT CHANGE UP
RBC # BLD: 4.4 M/UL — SIGNIFICANT CHANGE UP (ref 4.2–5.8)
RBC # BLD: 4.89 M/UL — SIGNIFICANT CHANGE UP (ref 4.2–5.8)
RBC # FLD: 15.8 % — HIGH (ref 10.3–14.5)
RBC # FLD: 15.8 % — HIGH (ref 10.3–14.5)
SAO2 % BLDA: 99 % — HIGH (ref 92–96)
SARS-COV-2 RNA SPEC QL NAA+PROBE: SIGNIFICANT CHANGE UP
SARS-COV-2 RNA SPEC QL NAA+PROBE: SIGNIFICANT CHANGE UP
SODIUM SERPL-SCNC: 144 MMOL/L — SIGNIFICANT CHANGE UP (ref 135–145)
SODIUM SERPL-SCNC: 146 MMOL/L — HIGH (ref 135–145)
WBC # BLD: 3.78 K/UL — LOW (ref 3.8–10.5)
WBC # BLD: 4.66 K/UL — SIGNIFICANT CHANGE UP (ref 3.8–10.5)
WBC # FLD AUTO: 3.78 K/UL — LOW (ref 3.8–10.5)
WBC # FLD AUTO: 4.66 K/UL — SIGNIFICANT CHANGE UP (ref 3.8–10.5)

## 2020-04-02 PROCEDURE — 99233 SBSQ HOSP IP/OBS HIGH 50: CPT

## 2020-04-02 PROCEDURE — 71045 X-RAY EXAM CHEST 1 VIEW: CPT | Mod: 26

## 2020-04-02 PROCEDURE — 99291 CRITICAL CARE FIRST HOUR: CPT | Mod: 25

## 2020-04-02 PROCEDURE — 43752 NASAL/OROGASTRIC W/TUBE PLMT: CPT | Mod: 59

## 2020-04-02 PROCEDURE — 71045 X-RAY EXAM CHEST 1 VIEW: CPT | Mod: 26,77

## 2020-04-02 RX ORDER — HYDROXYCHLOROQUINE SULFATE 200 MG
200 TABLET ORAL EVERY 12 HOURS
Refills: 0 | Status: DISCONTINUED | OUTPATIENT
Start: 2020-04-03 | End: 2020-04-03

## 2020-04-02 RX ORDER — PROPOFOL 10 MG/ML
20 INJECTION, EMULSION INTRAVENOUS
Qty: 1000 | Refills: 0 | Status: DISCONTINUED | OUTPATIENT
Start: 2020-04-02 | End: 2020-04-03

## 2020-04-02 RX ORDER — HYDROXYCHLOROQUINE SULFATE 200 MG
400 TABLET ORAL EVERY 12 HOURS
Refills: 0 | Status: COMPLETED | OUTPATIENT
Start: 2020-04-02 | End: 2020-04-03

## 2020-04-02 RX ORDER — ACETAMINOPHEN 500 MG
650 TABLET ORAL ONCE
Refills: 0 | Status: COMPLETED | OUTPATIENT
Start: 2020-04-02 | End: 2020-04-02

## 2020-04-02 RX ORDER — CHLORHEXIDINE GLUCONATE 213 G/1000ML
15 SOLUTION TOPICAL EVERY 12 HOURS
Refills: 0 | Status: DISCONTINUED | OUTPATIENT
Start: 2020-04-02 | End: 2020-04-04

## 2020-04-02 RX ORDER — CHLORHEXIDINE GLUCONATE 213 G/1000ML
1 SOLUTION TOPICAL
Refills: 0 | Status: DISCONTINUED | OUTPATIENT
Start: 2020-04-02 | End: 2020-04-14

## 2020-04-02 RX ORDER — PIPERACILLIN AND TAZOBACTAM 4; .5 G/20ML; G/20ML
3.38 INJECTION, POWDER, LYOPHILIZED, FOR SOLUTION INTRAVENOUS EVERY 8 HOURS
Refills: 0 | Status: DISCONTINUED | OUTPATIENT
Start: 2020-04-03 | End: 2020-04-08

## 2020-04-02 RX ORDER — CHLORHEXIDINE GLUCONATE 213 G/1000ML
15 SOLUTION TOPICAL EVERY 12 HOURS
Refills: 0 | Status: DISCONTINUED | OUTPATIENT
Start: 2020-04-02 | End: 2020-04-02

## 2020-04-02 RX ORDER — AZITHROMYCIN 500 MG/1
500 TABLET, FILM COATED ORAL DAILY
Refills: 0 | Status: DISCONTINUED | OUTPATIENT
Start: 2020-04-02 | End: 2020-04-03

## 2020-04-02 RX ORDER — ALBUMIN HUMAN 25 %
250 VIAL (ML) INTRAVENOUS ONCE
Refills: 0 | Status: COMPLETED | OUTPATIENT
Start: 2020-04-02 | End: 2020-04-02

## 2020-04-02 RX ORDER — PIPERACILLIN AND TAZOBACTAM 4; .5 G/20ML; G/20ML
3.38 INJECTION, POWDER, LYOPHILIZED, FOR SOLUTION INTRAVENOUS ONCE
Refills: 0 | Status: COMPLETED | OUTPATIENT
Start: 2020-04-02 | End: 2020-04-02

## 2020-04-02 RX ORDER — SODIUM CHLORIDE 9 MG/ML
10 INJECTION INTRAMUSCULAR; INTRAVENOUS; SUBCUTANEOUS
Refills: 0 | Status: DISCONTINUED | OUTPATIENT
Start: 2020-04-02 | End: 2020-04-13

## 2020-04-02 RX ORDER — ACETAMINOPHEN 500 MG
1000 TABLET ORAL ONCE
Refills: 0 | Status: COMPLETED | OUTPATIENT
Start: 2020-04-02 | End: 2020-04-02

## 2020-04-02 RX ORDER — POTASSIUM CHLORIDE 20 MEQ
40 PACKET (EA) ORAL ONCE
Refills: 0 | Status: COMPLETED | OUTPATIENT
Start: 2020-04-02 | End: 2020-04-02

## 2020-04-02 RX ORDER — HYDROXYCHLOROQUINE SULFATE 200 MG
TABLET ORAL
Refills: 0 | Status: DISCONTINUED | OUTPATIENT
Start: 2020-04-02 | End: 2020-04-03

## 2020-04-02 RX ADMIN — PIPERACILLIN AND TAZOBACTAM 200 GRAM(S): 4; .5 INJECTION, POWDER, LYOPHILIZED, FOR SOLUTION INTRAVENOUS at 22:53

## 2020-04-02 RX ADMIN — ATORVASTATIN CALCIUM 20 MILLIGRAM(S): 80 TABLET, FILM COATED ORAL at 22:53

## 2020-04-02 RX ADMIN — PROPOFOL 8.63 MICROGRAM(S)/KG/MIN: 10 INJECTION, EMULSION INTRAVENOUS at 19:14

## 2020-04-02 RX ADMIN — Medication 650 MILLIGRAM(S): at 06:36

## 2020-04-02 RX ADMIN — Medication 125 MILLILITER(S): at 22:54

## 2020-04-02 RX ADMIN — Medication 1 GRAM(S): at 11:38

## 2020-04-02 RX ADMIN — Medication 650 MILLIGRAM(S): at 22:55

## 2020-04-02 RX ADMIN — ENOXAPARIN SODIUM 40 MILLIGRAM(S): 100 INJECTION SUBCUTANEOUS at 11:39

## 2020-04-02 RX ADMIN — Medication 400 MILLIGRAM(S): at 22:53

## 2020-04-02 RX ADMIN — Medication 25 MILLIGRAM(S): at 06:36

## 2020-04-02 RX ADMIN — Medication 130 MILLIGRAM(S): at 11:38

## 2020-04-02 RX ADMIN — Medication 400 MILLIGRAM(S): at 22:55

## 2020-04-02 RX ADMIN — Medication 100 MILLIGRAM(S): at 11:39

## 2020-04-02 RX ADMIN — Medication 130 MILLIGRAM(S): at 01:30

## 2020-04-02 RX ADMIN — Medication 650 MILLIGRAM(S): at 15:15

## 2020-04-02 RX ADMIN — Medication 1 TABLET(S): at 11:39

## 2020-04-02 RX ADMIN — AZITHROMYCIN 500 MILLIGRAM(S): 500 TABLET, FILM COATED ORAL at 22:53

## 2020-04-02 RX ADMIN — Medication 20 MILLIGRAM(S): at 22:53

## 2020-04-02 RX ADMIN — Medication 1 GRAM(S): at 06:37

## 2020-04-02 RX ADMIN — Medication 40 MILLIEQUIVALENT(S): at 15:15

## 2020-04-02 RX ADMIN — PANTOPRAZOLE SODIUM 40 MILLIGRAM(S): 20 TABLET, DELAYED RELEASE ORAL at 06:36

## 2020-04-02 RX ADMIN — Medication 1 MILLIGRAM(S): at 11:39

## 2020-04-02 RX ADMIN — Medication 130 MILLIGRAM(S): at 06:50

## 2020-04-02 RX ADMIN — Medication 1 GRAM(S): at 22:52

## 2020-04-02 NOTE — PROGRESS NOTE ADULT - PROBLEM SELECTOR PLAN 6
possible covid due to presumed exposure while in icu being treated for etoh withdrawal    covid negative times one will repeat   flu negative await RVP   still febrile cxr noted ? consider HCAP or aspiration PNA  willd /w ID    f/u blood cx possible covid due to presumed exposure while in icu being treated for etoh withdrawal    covid negative times one will repeat  again on today  get ct chest     flu negative await RVP   still febrile cxr noted ? consider HCAP or aspiration PNA  will d /w ID    f/u blood cx possible covid due to presumed exposure while in icu being treated for etoh withdrawal    covid negative times one will repeat  again on today ? false positive  get ct chest     flu negative await RVP   still febrile cxr noted   ? consider HCAP or aspiration PNA  will d /w ID    start zosyn   f/u blood cx

## 2020-04-02 NOTE — PROGRESS NOTE ADULT - PROBLEM SELECTOR PLAN 5
One to one observation for confusion/agitation   DVTp: lovenox   Fall Precaution DVTp: lovenox   Fall Precaution

## 2020-04-02 NOTE — PROGRESS NOTE ADULT - PROBLEM SELECTOR PLAN 1
s/p ICU course for severe withdrawals   pt is still confused and tremulous   cont w/ phenobarb, Thiamine and Folic Acid s/p ICU course for severe withdrawals   pt is still confused and tremulous   cont w/ phenobarb, Thiamine and Folic Acid   ?hold phenobarb

## 2020-04-02 NOTE — CHART NOTE - NSCHARTNOTEFT_GEN_A_CORE
Upon Nutritional Assessment by the Registered Dietitian your patient was determined to meet criteria / has evidence of the following diagnosis/diagnoses:          [ ]  Mild Protein Calorie Malnutrition        [x ]  Moderate Protein Calorie Malnutrition        [ ] Severe Protein Calorie Malnutrition        [ ] Unspecified Protein Calorie Malnutrition        [ ] Underweight / BMI <19        [ ] Morbid Obesity / BMI > 40      Findings as based on:  •  Comprehensive nutrition assessment and consultation  •  Calorie counts (nutrient intake analysis)  •  Food acceptance and intake status from observations by staff  •  Follow up  •  Patient education  •  Intervention secondary to interdisciplinary rounds  •   concerns      Treatment:    The following diet has been recommended: Full liquid w/ ensure enlive 8 oz twice per day (700 lonnie & 40 g pro)       PROVIDER Section:     By signing this assessment you are acknowledging and agree with the diagnosis/diagnoses assigned by the Registered Dietitian    Comments:

## 2020-04-02 NOTE — DIETITIAN INITIAL EVALUATION ADULT. - OTHER INFO
Pt seen today on medical floor, Contact / Droplet transferred from ICU. Admitted w/ c/o abd pain w/ h/o ETOH abuse. Pt is confused & tremulous ; Fever ; COVID negative. Unable to obtain usual diet / weight hx at this time. Pt seen today on medical floor, Contact / Droplet transferred from ICU. Admitted w/ c/o abd pain w/ h/o ETOH abuse. Pt is confused & tremulous -> Also unable to Perform physical Assessment ; Fever ; COVID negative. Unable to obtain usual diet / weight hx at this time.

## 2020-04-02 NOTE — AIRWAY PLACEMENT NOTE ADULT - POST AIRWAY PLACEMENT ASSESSMENT:
breath sounds equal/chest excursion noted/positive end tidal CO2 noted/breath sounds bilateral/CXR pending

## 2020-04-02 NOTE — DIETITIAN INITIAL EVALUATION ADULT. - PROBLEM/PLAN-5
Occupational Therapy      Patient Name:  Sheryl Martines   MRN:  [id number]    Patient not seen today secondary to Unavailable (Comment). Pt in the process of being moved to new floor at time of OT attempt. OT unable to attempt again later in the day. Will follow-up tomorrow.    Chyna Dahl, OT  2/28/2019   DISPLAY PLAN FREE TEXT

## 2020-04-02 NOTE — PROGRESS NOTE ADULT - PROBLEM SELECTOR PROBLEM 1
Alcohol withdrawal syndrome with complication

## 2020-04-02 NOTE — CHART NOTE - NSCHARTNOTEFT_GEN_A_CORE
Medicine Hospitalist PA    Requested to place ngtube. Placed [place] sump. Pt tolerated the procedure. Confirmed with auscultation will do CXR prior to use the ngtube.

## 2020-04-02 NOTE — DIETITIAN INITIAL EVALUATION ADULT. - PERTINENT LABORATORY DATA
04-02 Na146 mmol/L<H> Glu 109 mg/dL<H> K+ 3.2 mmol/L<L> Cr  0.77 mg/dL BUN 13 mg/dL 04-02 Phos 3.3 mg/dL 04-02 Alb 2.8 g/dL<L>04-02 ALT 23 U/L AST 19 U/L Alkaline Phosphatase 47 U/L

## 2020-04-02 NOTE — AIRWAY PLACEMENT NOTE ADULT - AIRWAY TYPE
oral I have personally evaluated and examined the patient. The Attending was available to me as a supervising provider if needed.

## 2020-04-02 NOTE — CHART NOTE - NSCHARTNOTEFT_GEN_A_CORE
Medicine Hospitalist PA    RRT called for hypoxia and tachypnea. Pt is in acute resp distress desat on NRB and Continuous Nasal Cannula at 5L pulse Ox 76%. RRT called. Pt intubated. Transfer to ICU service.     Vital Signs Last 24 Hrs  T(C): 38.4 (02 Apr 2020 17:25), Max: 38.4 (02 Apr 2020 17:25)  T(F): 101.2 (02 Apr 2020 17:25), Max: 101.2 (02 Apr 2020 17:25)  HR: 128 (02 Apr 2020 17:25) (100 - 128)  BP: 103/66 (02 Apr 2020 17:25) (103/66 - 130/80)  RR: 18 (02 Apr 2020 17:25) (18 - 19)  SpO2: 86% (02 Apr 2020 17:25) (86% - 97%)    A/P: 54 YOM with ETOH abuse admitted with etoh abuse. Spiked fevers, r/o COVID. Acute resp distress 2/2 hypoxia.  - STAT Intubation  - STAT ofirmev  - Ofirmev  - Transfer to ICU for further care Medicine Hospitalist PA    RRT called for hypoxia and tachypnea. Pt is in acute resp distress desat on NRB and Continuous Nasal Cannula at 5L pulse Ox 76%. RRT called. Pt intubated by anesthesia. Transfer to ICU service.     Vital Signs Last 24 Hrs  T(C): 38.4 (02 Apr 2020 17:25), Max: 38.4 (02 Apr 2020 17:25)  T(F): 101.2 (02 Apr 2020 17:25), Max: 101.2 (02 Apr 2020 17:25)  HR: 128 (02 Apr 2020 17:25) (100 - 128)  BP: 103/66 (02 Apr 2020 17:25) (103/66 - 130/80)  RR: 18 (02 Apr 2020 17:25) (18 - 19)  SpO2: 86% (02 Apr 2020 17:25) (86% - 97%)    General: awake, lethargic, tachypniec, labored breathing  CV: S1 S2, sinus tachy  Resp: +wheezing b/l  Abd: Soft, NT/ND, BS+  Ext: no LE edema, pulses intact, no calf tenderness  Neuro: lethargic    A/P: 54 YOM with ETOH abuse admitted with etoh abuse. Spiked fevers, r/o COVID. Acute resp distress 2/2 hypoxia.  - STAT Intubation, sedation meds given by anesthesia  - STAT ofirmev for fever  - STAT propofol gtt  - Vent @25  - Pending CTA  - STAT CXR for ET tube placement  - Family son Lavelle Degroot notified of pt status  - Transfer to ICU for further care  - Dr Kingsley aware    Time critical care spent: 30 minutes

## 2020-04-02 NOTE — PROGRESS NOTE ADULT - PROBLEM SELECTOR PLAN 2
Sucrafate QID  Protonix daily  Zofran Q6H
cont w/ Sucralfate and Protonix

## 2020-04-02 NOTE — PROGRESS NOTE ADULT - PROBLEM/PLAN-3
Massage Therapy Progress Note      Length of treatment: 60-minute    Authorization: Patient request    Reviewed contraindications/current health status with Patient    No Contraindications to massage therapy exist    Subjective:  Patient complains of:  Stiffness/tension in  Neck, Shoulders and Upper back    Pain report prior to treatment:  Pain relief not a goal of treatment    Type of treatment requested: Therapeutic massage    Specific requests:  Full body  and Scalp     Objective Observations:  Hypertonicity noted in:  Cervical paraspinals Bilateral, Upper trapezius Bilateral and Levator scapula Bilateral    Hypotonicity/Ischemia noted in: None noted    Decreased ROM noted in No areas    Additional observations:  None    Assessment:  Patient received Cross-fiber friction, Longitudinal friction, Trigger point therapy and [place]an techniques to affected tissues.    Additional treatment provided: lavender oil and hot stones.    Response to treatment: Hypertonicity in traps and neck  noted tissues decreased and Patient gave positive verbal feedback    Pain report after treatment:Pain relief not a goal of treatment    Education/Resources: None provided this visit    Plan/Recommendations:  Massage therapy as desired  Session concluded  
DISPLAY PLAN FREE TEXT

## 2020-04-02 NOTE — DIETITIAN INITIAL EVALUATION ADULT. - PERTINENT MEDS FT
MEDICATIONS  (STANDING):  atorvastatin 20 milliGRAM(s) Oral at bedtime  dicyclomine 20 milliGRAM(s) Oral three times a day before meals  enoxaparin Injectable 40 milliGRAM(s) SubCutaneous daily  folic acid 1 milliGRAM(s) Oral daily  metoprolol tartrate 25 milliGRAM(s) Oral two times a day  multivitamin 1 Tablet(s) Oral daily  pantoprazole    Tablet 40 milliGRAM(s) Oral before breakfast  PHENobarbital Injectable 130 milliGRAM(s) IntraMuscular four times a day  potassium chloride    Tablet ER 40 milliEquivalent(s) Oral once  sucralfate suspension 1 Gram(s) Oral four times a day  thiamine 100 milliGRAM(s) Oral daily    MEDICATIONS  (PRN):  acetaminophen   Tablet .. 650 milliGRAM(s) Oral every 4 hours PRN Temp greater or equal to 38C (100.4F), Mild Pain (1 - 3)  guaiFENesin   Syrup  (Sugar-Free) 100 milliGRAM(s) Oral every 6 hours PRN Cough  PHENobarbital Injectable 130 milliGRAM(s) IntraMuscular every 6 hours PRN agitation

## 2020-04-02 NOTE — PROGRESS NOTE ADULT - SUBJECTIVE AND OBJECTIVE BOX
Patient is a 54y old  Male who presents with a chief complaint of Abdominal Pain (29 Mar 2020 12:32)      INTERVAL HPI/ OVERNIGHT EVENTS: Pt was seen and examined at bedside today, No significant overnight events, pt is confused, interview is unreliable.      still febrile on today     MEDICATIONS  (STANDING):  atorvastatin 20 milliGRAM(s) Oral at bedtime  dicyclomine 20 milliGRAM(s) Oral three times a day before meals  enoxaparin Injectable 40 milliGRAM(s) SubCutaneous daily  folic acid 1 milliGRAM(s) Oral daily  metoprolol tartrate 25 milliGRAM(s) Oral two times a day  multivitamin 1 Tablet(s) Oral daily  pantoprazole    Tablet 40 milliGRAM(s) Oral before breakfast  PHENobarbital Injectable 130 milliGRAM(s) IntraMuscular four times a day  potassium chloride    Tablet ER 40 milliEquivalent(s) Oral once  sucralfate suspension 1 Gram(s) Oral four times a day  thiamine 100 milliGRAM(s) Oral daily    MEDICATIONS  (PRN):  acetaminophen   Tablet .. 650 milliGRAM(s) Oral every 4 hours PRN Temp greater or equal to 38C (100.4F), Mild Pain (1 - 3)  guaiFENesin   Syrup  (Sugar-Free) 100 milliGRAM(s) Oral every 6 hours PRN Cough  PHENobarbital Injectable 130 milliGRAM(s) IntraMuscular every 6 hours PRN agitation      Allergies    No Known Allergies    Intolerances        REVIEW OF SYSTEMS:    Unable to examine due to [x ] Encephalopathy [ ] Advanced Dementia [ ] Expressive Aphasia [ ] Non-verbal patient    Vital Signs Last 24 Hrs  T(C): 37.8 (02 Apr 2020 12:19), Max: 39.8 (01 Apr 2020 14:35)  T(F): 100 (02 Apr 2020 12:19), Max: 103.7 (01 Apr 2020 14:35)  HR: 111 (02 Apr 2020 12:19) (100 - 115)  BP: 130/80 (02 Apr 2020 12:19) (103/71 - 145/80)  BP(mean): --  RR: 18 (02 Apr 2020 12:19) (18 - 20)  SpO2: 95% (02 Apr 2020 12:19) (95% - 98%)    PHYSICAL EXAM:  GENERAL: NAD, well-developed, well-groomed  HEAD:  Atraumatic, Normocephalic  EYES: conjunctiva and sclera clear  ENMT: Moist mucous membranes  NECK: Supple, No JVD, Normal thyroid  CHEST/LUNG: Clear to Auscultation bilaterally; No rales, rhonchi, wheezing, or rubs  HEART: Regular rate and rhythm; No murmurs, rubs, or gallops  ABDOMEN: Soft, Nontender, Nondistended; Bowel sounds present  EXTREMITIES:  2+ Peripheral Pulses, No clubbing, cyanosis, or edema  SKIN: No rashes or lesions  NERVOUS SYSTEM:  awake, tremulous and confused     LABS:                                12.1   3.78  )-----------( 259      ( 02 Apr 2020 08:23 )             38.9   04-02    146<H>  |  114<H>  |  13  ----------------------------<  109<H>  3.2<L>   |  24  |  0.77    Ca    9.0      02 Apr 2020 08:23  Phos  3.3     04-02  Mg     1.7     04-02    TPro  7.9  /  Alb  2.8<L>  /  TBili  0.4  /  DBili  x   /  AST  19  /  ALT  23  /  AlkPhos  47  04-02    Ferritin, Serum in AM (04.02.20 @ 11:03)    Ferritin, Serum: 353 ng/mL    Procalcitonin, Serum (04.02.20 @ 11:22)    Procalcitonin, Serum: 0.30: Procalcitonin (PCT) Interpretation (ng/mL) - Diagnosis of systemic  bacterial infection/sepsis  PCT < 0.5: Systemic infection (sepsis) is not likely and risk for  progression to severe systemic infection is low. Local bacterial  infection is possible. If early sepsis is suspected clinically, PCT  should be re-assessed in 6-24 hours.  PCT >/= 0.5 but < 2.0: Systemic infection (sepsis) is possible, but other  conditions are known to elevate PCT as well. Moderate risk for  progression to severe systemic infection. The patient should be closely  monitored both clinically and by re-assessing PCT within 6-24 hours.  PCT >/= 2.0 but < 10.0: Systemic infection (sepsis) is likely, unless  other causes are known. High risk of progression to severe systemic  infection (severe sepsis/septic shock).  PCT >/= 10.0: Important systemic inflammatory response, almost  exclusively due to severe bacterial sepsis or septic shock. High  likelihood of severe sepsis or septic shock. ng/mL    Lactate Dehydrogenase, Serum in AM (04.02.20 @ 11:22)    Lactate Dehydrogenase, Serum: 339 U/L        CAPILLARY BLOOD GLUCOSE              RADIOLOGY & ADDITIONAL TESTS:    < from: Xray Chest 1 View- PORTABLE-Urgent (04.01.20 @ 16:55) >  IMPRESSION:  Mild prominence of interstitial markings with scattered faint opacities.          < end of copied text >        Imaging Personally Reviewed:  [ ] YES  [ ] NO    Consultant(s) Notes Reviewed:  [ ] YES  [ ] NO    Lactate Dehydrogenase, Serum in AM (04.02.20 @ 11:22)    Lactate Dehydrogenase, Serum: 339 U/L    Care Discussed with Consultants/Other Providers [x ] YES  [ ] NO Patient is a 54y old  Male who presents with a chief complaint of Abdominal Pain (29 Mar 2020 12:32)      INTERVAL HPI/ OVERNIGHT EVENTS: Pt was seen and examined at bedside today, No significant overnight events, pt is confused, interview is unreliable.      still febrile on today     MEDICATIONS  (STANDING):  atorvastatin 20 milliGRAM(s) Oral at bedtime  dicyclomine 20 milliGRAM(s) Oral three times a day before meals  enoxaparin Injectable 40 milliGRAM(s) SubCutaneous daily  folic acid 1 milliGRAM(s) Oral daily  metoprolol tartrate 25 milliGRAM(s) Oral two times a day  multivitamin 1 Tablet(s) Oral daily  pantoprazole    Tablet 40 milliGRAM(s) Oral before breakfast  PHENobarbital Injectable 130 milliGRAM(s) IntraMuscular four times a day  potassium chloride    Tablet ER 40 milliEquivalent(s) Oral once  sucralfate suspension 1 Gram(s) Oral four times a day  thiamine 100 milliGRAM(s) Oral daily    MEDICATIONS  (PRN):  acetaminophen   Tablet .. 650 milliGRAM(s) Oral every 4 hours PRN Temp greater or equal to 38C (100.4F), Mild Pain (1 - 3)  guaiFENesin   Syrup  (Sugar-Free) 100 milliGRAM(s) Oral every 6 hours PRN Cough  PHENobarbital Injectable 130 milliGRAM(s) IntraMuscular every 6 hours PRN agitation      Allergies    No Known Allergies    Intolerances        REVIEW OF SYSTEMS:    Unable to examine due to [x ] Encephalopathy [ ] Advanced Dementia [ ] Expressive Aphasia [ ] Non-verbal patient    Vital Signs Last 24 Hrs  T(C): 37.8 (02 Apr 2020 12:19), Max: 39.8 (01 Apr 2020 14:35)  T(F): 100 (02 Apr 2020 12:19), Max: 103.7 (01 Apr 2020 14:35)  HR: 111 (02 Apr 2020 12:19) (100 - 115)  BP: 130/80 (02 Apr 2020 12:19) (103/71 - 145/80)  BP(mean): --  RR: 18 (02 Apr 2020 12:19) (18 - 20)  SpO2: 95% (02 Apr 2020 12:19) (95% - 98%)    PHYSICAL EXAM:  GENERAL: NAD, well-developed, well-groomed  HEAD:  Atraumatic, Normocephalic  EYES: conjunctiva and sclera clear  ENMT: Moist mucous membranes  NECK: Supple, No JVD, Normal thyroid  CHEST/LUNG: coarse rhonchi  No rales, rhonchi, wheezing, or rubs  HEART: Regular rate and rhythm; No murmurs, rubs, or gallops  ABDOMEN: Soft, Nontender, Nondistended; Bowel sounds present  EXTREMITIES:  2+ Peripheral Pulses, No clubbing, cyanosis, or edema  SKIN: No rashes or lesions  NERVOUS SYSTEM:  awake, tremulous and confused     LABS:                                12.1   3.78  )-----------( 259      ( 02 Apr 2020 08:23 )             38.9   04-02    146<H>  |  114<H>  |  13  ----------------------------<  109<H>  3.2<L>   |  24  |  0.77    Ca    9.0      02 Apr 2020 08:23  Phos  3.3     04-02  Mg     1.7     04-02    TPro  7.9  /  Alb  2.8<L>  /  TBili  0.4  /  DBili  x   /  AST  19  /  ALT  23  /  AlkPhos  47  04-02    Ferritin, Serum in AM (04.02.20 @ 11:03)    Ferritin, Serum: 353 ng/mL    Procalcitonin, Serum (04.02.20 @ 11:22)    Procalcitonin, Serum: 0.30: Procalcitonin (PCT) Interpretation (ng/mL) - Diagnosis of systemic  bacterial infection/sepsis  PCT < 0.5: Systemic infection (sepsis) is not likely and risk for  progression to severe systemic infection is low. Local bacterial  infection is possible. If early sepsis is suspected clinically, PCT  should be re-assessed in 6-24 hours.  PCT >/= 0.5 but < 2.0: Systemic infection (sepsis) is possible, but other  conditions are known to elevate PCT as well. Moderate risk for  progression to severe systemic infection. The patient should be closely  monitored both clinically and by re-assessing PCT within 6-24 hours.  PCT >/= 2.0 but < 10.0: Systemic infection (sepsis) is likely, unless  other causes are known. High risk of progression to severe systemic  infection (severe sepsis/septic shock).  PCT >/= 10.0: Important systemic inflammatory response, almost  exclusively due to severe bacterial sepsis or septic shock. High  likelihood of severe sepsis or septic shock. ng/mL    Lactate Dehydrogenase, Serum in AM (04.02.20 @ 11:22)    Lactate Dehydrogenase, Serum: 339 U/L        CAPILLARY BLOOD GLUCOSE              RADIOLOGY & ADDITIONAL TESTS:    < from: Xray Chest 1 View- PORTABLE-Urgent (04.01.20 @ 16:55) >  IMPRESSION:  Mild prominence of interstitial markings with scattered faint opacities.          < end of copied text >        Imaging Personally Reviewed:  [ ] YES  [ ] NO    Consultant(s) Notes Reviewed:  [ ] YES  [ ] NO    Lactate Dehydrogenase, Serum in AM (04.02.20 @ 11:22)    Lactate Dehydrogenase, Serum: 339 U/L    Care Discussed with Consultants/Other Providers [x ] YES  [ ] NO Patient is a 54y old  Male who presents with a chief complaint of Abdominal Pain (29 Mar 2020 12:32)    still febrile on today     MEDICATIONS  (STANDING):  atorvastatin 20 milliGRAM(s) Oral at bedtime  dicyclomine 20 milliGRAM(s) Oral three times a day before meals  enoxaparin Injectable 40 milliGRAM(s) SubCutaneous daily  folic acid 1 milliGRAM(s) Oral daily  metoprolol tartrate 25 milliGRAM(s) Oral two times a day  multivitamin 1 Tablet(s) Oral daily  pantoprazole    Tablet 40 milliGRAM(s) Oral before breakfast  PHENobarbital Injectable 130 milliGRAM(s) IntraMuscular four times a day  potassium chloride    Tablet ER 40 milliEquivalent(s) Oral once  sucralfate suspension 1 Gram(s) Oral four times a day  thiamine 100 milliGRAM(s) Oral daily    MEDICATIONS  (PRN):  acetaminophen   Tablet .. 650 milliGRAM(s) Oral every 4 hours PRN Temp greater or equal to 38C (100.4F), Mild Pain (1 - 3)  guaiFENesin   Syrup  (Sugar-Free) 100 milliGRAM(s) Oral every 6 hours PRN Cough  PHENobarbital Injectable 130 milliGRAM(s) IntraMuscular every 6 hours PRN agitation      Allergies    No Known Allergies    Intolerances        REVIEW OF SYSTEMS:    Unable to examine due to [x ] Encephalopathy [ ] Advanced Dementia [ ] Expressive Aphasia [ ] Non-verbal patient    Vital Signs Last 24 Hrs  T(C): 37.8 (02 Apr 2020 12:19), Max: 39.8 (01 Apr 2020 14:35)  T(F): 100 (02 Apr 2020 12:19), Max: 103.7 (01 Apr 2020 14:35)  HR: 111 (02 Apr 2020 12:19) (100 - 115)  BP: 130/80 (02 Apr 2020 12:19) (103/71 - 145/80)  BP(mean): --  RR: 18 (02 Apr 2020 12:19) (18 - 20)  SpO2: 95% (02 Apr 2020 12:19) (95% - 98%)    PHYSICAL EXAM:  GENERAL: NAD, well-developed, well-groomed  HEAD:  Atraumatic, Normocephalic  EYES: conjunctiva and sclera clear  ENMT: Moist mucous membranes  NECK: Supple, No JVD, Normal thyroid  CHEST/LUNG: coarse rhonchi  No rales, rhonchi, wheezing, or rubs  HEART: Regular rate and rhythm; No murmurs, rubs, or gallops  ABDOMEN: Soft, Nontender, Nondistended; Bowel sounds present  EXTREMITIES:  2+ Peripheral Pulses, No clubbing, cyanosis, or edema  SKIN: No rashes or lesions  NERVOUS SYSTEM:  sleepy , tremulous and confused     LABS:                                12.1   3.78  )-----------( 259      ( 02 Apr 2020 08:23 )             38.9   04-02    146<H>  |  114<H>  |  13  ----------------------------<  109<H>  3.2<L>   |  24  |  0.77    Ca    9.0      02 Apr 2020 08:23  Phos  3.3     04-02  Mg     1.7     04-02    TPro  7.9  /  Alb  2.8<L>  /  TBili  0.4  /  DBili  x   /  AST  19  /  ALT  23  /  AlkPhos  47  04-02    Ferritin, Serum in AM (04.02.20 @ 11:03)    Ferritin, Serum: 353 ng/mL    Procalcitonin, Serum (04.02.20 @ 11:22)    Procalcitonin, Serum: 0.30: Procalcitonin (PCT) Interpretation (ng/mL) - Diagnosis of systemic  bacterial infection/sepsis  PCT < 0.5: Systemic infection (sepsis) is not likely and risk for  progression to severe systemic infection is low. Local bacterial  infection is possible. If early sepsis is suspected clinically, PCT  should be re-assessed in 6-24 hours.  PCT >/= 0.5 but < 2.0: Systemic infection (sepsis) is possible, but other  conditions are known to elevate PCT as well. Moderate risk for  progression to severe systemic infection. The patient should be closely  monitored both clinically and by re-assessing PCT within 6-24 hours.  PCT >/= 2.0 but < 10.0: Systemic infection (sepsis) is likely, unless  other causes are known. High risk of progression to severe systemic  infection (severe sepsis/septic shock).  PCT >/= 10.0: Important systemic inflammatory response, almost  exclusively due to severe bacterial sepsis or septic shock. High  likelihood of severe sepsis or septic shock. ng/mL    Lactate Dehydrogenase, Serum in AM (04.02.20 @ 11:22)    Lactate Dehydrogenase, Serum: 339 U/L        CAPILLARY BLOOD GLUCOSE              RADIOLOGY & ADDITIONAL TESTS:    < from: Xray Chest 1 View- PORTABLE-Urgent (04.01.20 @ 16:55) >  IMPRESSION:  Mild prominence of interstitial markings with scattered faint opacities.          < end of copied text >        Imaging Personally Reviewed:  [ ] YES  [ ] NO    Consultant(s) Notes Reviewed:  [ ] YES  [ ] NO    Lactate Dehydrogenase, Serum in AM (04.02.20 @ 11:22)    Lactate Dehydrogenase, Serum: 339 U/L    Care Discussed with Consultants/Other Providers [x ] YES  [ ] NO

## 2020-04-02 NOTE — DIETITIAN INITIAL EVALUATION ADULT. - DIET TYPE
3/26 - Full liquid full fluid/supplement (specify)/ensure enlive 8 oz twice per day (700 lonnie & 40 g pro)

## 2020-04-03 LAB
ALBUMIN SERPL ELPH-MCNC: 2.9 G/DL — LOW (ref 3.3–5)
ALP SERPL-CCNC: 41 U/L — SIGNIFICANT CHANGE UP (ref 40–120)
ALT FLD-CCNC: 21 U/L — SIGNIFICANT CHANGE UP (ref 12–78)
ANION GAP SERPL CALC-SCNC: 9 MMOL/L — SIGNIFICANT CHANGE UP (ref 5–17)
AST SERPL-CCNC: 22 U/L — SIGNIFICANT CHANGE UP (ref 15–37)
BASOPHILS # BLD AUTO: 0.04 K/UL — SIGNIFICANT CHANGE UP (ref 0–0.2)
BASOPHILS NFR BLD AUTO: 0.6 % — SIGNIFICANT CHANGE UP (ref 0–2)
BILIRUB SERPL-MCNC: 0.4 MG/DL — SIGNIFICANT CHANGE UP (ref 0.2–1.2)
BUN SERPL-MCNC: 25 MG/DL — HIGH (ref 7–23)
CALCIUM SERPL-MCNC: 8.7 MG/DL — SIGNIFICANT CHANGE UP (ref 8.5–10.1)
CHLORIDE SERPL-SCNC: 110 MMOL/L — HIGH (ref 96–108)
CO2 SERPL-SCNC: 25 MMOL/L — SIGNIFICANT CHANGE UP (ref 22–31)
CREAT SERPL-MCNC: 2.4 MG/DL — HIGH (ref 0.5–1.3)
EOSINOPHIL # BLD AUTO: 0.01 K/UL — SIGNIFICANT CHANGE UP (ref 0–0.5)
EOSINOPHIL NFR BLD AUTO: 0.2 % — SIGNIFICANT CHANGE UP (ref 0–6)
GLUCOSE SERPL-MCNC: 117 MG/DL — HIGH (ref 70–99)
HCT VFR BLD CALC: 39.9 % — SIGNIFICANT CHANGE UP (ref 39–50)
HGB BLD-MCNC: 12.2 G/DL — LOW (ref 13–17)
IMM GRANULOCYTES NFR BLD AUTO: 1.4 % — SIGNIFICANT CHANGE UP (ref 0–1.5)
LYMPHOCYTES # BLD AUTO: 0.78 K/UL — LOW (ref 1–3.3)
LYMPHOCYTES # BLD AUTO: 12.3 % — LOW (ref 13–44)
MAGNESIUM SERPL-MCNC: 1.8 MG/DL — SIGNIFICANT CHANGE UP (ref 1.6–2.6)
MCHC RBC-ENTMCNC: 27.9 PG — SIGNIFICANT CHANGE UP (ref 27–34)
MCHC RBC-ENTMCNC: 30.6 GM/DL — LOW (ref 32–36)
MCV RBC AUTO: 91.1 FL — SIGNIFICANT CHANGE UP (ref 80–100)
MONOCYTES # BLD AUTO: 0.95 K/UL — HIGH (ref 0–0.9)
MONOCYTES NFR BLD AUTO: 15 % — HIGH (ref 2–14)
NEUTROPHILS # BLD AUTO: 4.48 K/UL — SIGNIFICANT CHANGE UP (ref 1.8–7.4)
NEUTROPHILS NFR BLD AUTO: 70.5 % — SIGNIFICANT CHANGE UP (ref 43–77)
NRBC # BLD: 0 /100 WBCS — SIGNIFICANT CHANGE UP (ref 0–0)
PHOSPHATE SERPL-MCNC: 5.3 MG/DL — HIGH (ref 2.5–4.5)
PLATELET # BLD AUTO: 334 K/UL — SIGNIFICANT CHANGE UP (ref 150–400)
POTASSIUM SERPL-MCNC: 4 MMOL/L — SIGNIFICANT CHANGE UP (ref 3.5–5.3)
POTASSIUM SERPL-SCNC: 4 MMOL/L — SIGNIFICANT CHANGE UP (ref 3.5–5.3)
PROT SERPL-MCNC: 8 GM/DL — SIGNIFICANT CHANGE UP (ref 6–8.3)
RBC # BLD: 4.38 M/UL — SIGNIFICANT CHANGE UP (ref 4.2–5.8)
RBC # FLD: 15.9 % — HIGH (ref 10.3–14.5)
SODIUM SERPL-SCNC: 144 MMOL/L — SIGNIFICANT CHANGE UP (ref 135–145)
WBC # BLD: 6.35 K/UL — SIGNIFICANT CHANGE UP (ref 3.8–10.5)
WBC # FLD AUTO: 6.35 K/UL — SIGNIFICANT CHANGE UP (ref 3.8–10.5)

## 2020-04-03 PROCEDURE — 71045 X-RAY EXAM CHEST 1 VIEW: CPT | Mod: 26

## 2020-04-03 PROCEDURE — 99291 CRITICAL CARE FIRST HOUR: CPT

## 2020-04-03 PROCEDURE — 99232 SBSQ HOSP IP/OBS MODERATE 35: CPT

## 2020-04-03 RX ORDER — CHLORHEXIDINE GLUCONATE 213 G/1000ML
1 SOLUTION TOPICAL
Refills: 0 | Status: DISCONTINUED | OUTPATIENT
Start: 2020-04-03 | End: 2020-04-14

## 2020-04-03 RX ORDER — DEXMEDETOMIDINE HYDROCHLORIDE IN 0.9% SODIUM CHLORIDE 4 UG/ML
0.2 INJECTION INTRAVENOUS
Qty: 200 | Refills: 0 | Status: DISCONTINUED | OUTPATIENT
Start: 2020-04-03 | End: 2020-04-04

## 2020-04-03 RX ORDER — VANCOMYCIN HCL 1 G
1000 VIAL (EA) INTRAVENOUS ONCE
Refills: 0 | Status: COMPLETED | OUTPATIENT
Start: 2020-04-03 | End: 2020-04-03

## 2020-04-03 RX ORDER — PANTOPRAZOLE SODIUM 20 MG/1
40 TABLET, DELAYED RELEASE ORAL DAILY
Refills: 0 | Status: DISCONTINUED | OUTPATIENT
Start: 2020-04-03 | End: 2020-04-09

## 2020-04-03 RX ORDER — NOREPINEPHRINE BITARTRATE/D5W 8 MG/250ML
0.05 PLASTIC BAG, INJECTION (ML) INTRAVENOUS
Qty: 8 | Refills: 0 | Status: DISCONTINUED | OUTPATIENT
Start: 2020-04-03 | End: 2020-04-04

## 2020-04-03 RX ADMIN — DEXMEDETOMIDINE HYDROCHLORIDE IN 0.9% SODIUM CHLORIDE 3.6 MICROGRAM(S)/KG/HR: 4 INJECTION INTRAVENOUS at 21:15

## 2020-04-03 RX ADMIN — CHLORHEXIDINE GLUCONATE 15 MILLILITER(S): 213 SOLUTION TOPICAL at 05:36

## 2020-04-03 RX ADMIN — Medication 250 MILLIGRAM(S): at 13:26

## 2020-04-03 RX ADMIN — Medication 1 TABLET(S): at 11:07

## 2020-04-03 RX ADMIN — CHLORHEXIDINE GLUCONATE 1 APPLICATION(S): 213 SOLUTION TOPICAL at 05:36

## 2020-04-03 RX ADMIN — ATORVASTATIN CALCIUM 20 MILLIGRAM(S): 80 TABLET, FILM COATED ORAL at 23:21

## 2020-04-03 RX ADMIN — CHLORHEXIDINE GLUCONATE 15 MILLILITER(S): 213 SOLUTION TOPICAL at 17:30

## 2020-04-03 RX ADMIN — AZITHROMYCIN 500 MILLIGRAM(S): 500 TABLET, FILM COATED ORAL at 11:08

## 2020-04-03 RX ADMIN — Medication 1 GRAM(S): at 17:30

## 2020-04-03 RX ADMIN — PIPERACILLIN AND TAZOBACTAM 25 GRAM(S): 4; .5 INJECTION, POWDER, LYOPHILIZED, FOR SOLUTION INTRAVENOUS at 17:30

## 2020-04-03 RX ADMIN — Medication 6.74 MICROGRAM(S)/KG/MIN: at 05:36

## 2020-04-03 RX ADMIN — PROPOFOL 8.63 MICROGRAM(S)/KG/MIN: 10 INJECTION, EMULSION INTRAVENOUS at 08:09

## 2020-04-03 RX ADMIN — ENOXAPARIN SODIUM 40 MILLIGRAM(S): 100 INJECTION SUBCUTANEOUS at 11:08

## 2020-04-03 RX ADMIN — Medication 100 MILLIGRAM(S): at 13:25

## 2020-04-03 RX ADMIN — Medication 40 MILLIGRAM(S): at 05:36

## 2020-04-03 RX ADMIN — Medication 1 GRAM(S): at 11:08

## 2020-04-03 RX ADMIN — Medication 1 MILLIGRAM(S): at 11:07

## 2020-04-03 RX ADMIN — DEXMEDETOMIDINE HYDROCHLORIDE IN 0.9% SODIUM CHLORIDE 3.6 MICROGRAM(S)/KG/HR: 4 INJECTION INTRAVENOUS at 13:26

## 2020-04-03 RX ADMIN — DEXMEDETOMIDINE HYDROCHLORIDE IN 0.9% SODIUM CHLORIDE 3.6 MICROGRAM(S)/KG/HR: 4 INJECTION INTRAVENOUS at 16:50

## 2020-04-03 RX ADMIN — PANTOPRAZOLE SODIUM 40 MILLIGRAM(S): 20 TABLET, DELAYED RELEASE ORAL at 11:26

## 2020-04-03 RX ADMIN — DEXMEDETOMIDINE HYDROCHLORIDE IN 0.9% SODIUM CHLORIDE 3.6 MICROGRAM(S)/KG/HR: 4 INJECTION INTRAVENOUS at 23:21

## 2020-04-03 RX ADMIN — PIPERACILLIN AND TAZOBACTAM 25 GRAM(S): 4; .5 INJECTION, POWDER, LYOPHILIZED, FOR SOLUTION INTRAVENOUS at 11:09

## 2020-04-03 RX ADMIN — Medication 400 MILLIGRAM(S): at 11:07

## 2020-04-03 RX ADMIN — PIPERACILLIN AND TAZOBACTAM 25 GRAM(S): 4; .5 INJECTION, POWDER, LYOPHILIZED, FOR SOLUTION INTRAVENOUS at 02:21

## 2020-04-03 RX ADMIN — Medication 1 GRAM(S): at 05:36

## 2020-04-03 NOTE — CHART NOTE - NSCHARTNOTEFT_GEN_A_CORE
Assessment: Pt seen for follow-up & CCU admission . Pt with Alcohol intoxication with withdrawl s/p intubated 4/2 for acute hypoxemia, resp failure likely due to aspiration. Noted pt COVID-19 negative.    Factors impacting intake: [ ] none [ ] nausea  [ ] vomiting [ ] diarrhea [ ] constipation  [ ]chewing problems [ ] swallowing issues  [x ] other: acute hypoxia, distended abdomen & hypoactive bowels    Diet Prescription: Diet, NPO:   Except Medications (04-03-20 @ 12:29)    Intake:  Pt NPO since 4/2. Noted po intake 0-25% full liquids 3/26-4/2. PA requesting pt start NGT feeding. Last BM x 193/27). No BM x 1 week    Current Weight: Wt=71.9kg(3/26), Wt=70kg(4/3)  % Weight Change  1.9kg wt gain x 8 days.     Physical appearance:  +1 edema Rt foot; Nutrition focused physical exam conducted: Subcutaneous fat loss: [WNL ] Orbital fat pads region, [Unable ]Buccal fat region, [WNL ]Triceps region,  [distended abdomen ]Ribs region.  Muscle wasting: [Moderate ]Temples region, [Mild ]Clavicle region, [Mild ]Shoulder region, [ Unable]Scapula region, [Edematous ]Interosseous region,  [ Moderate]thigh region, [WNL ]Calf region    Pertinent Medications: MEDICATIONS  (STANDING):  atorvastatin 20 milliGRAM(s) Oral at bedtime  chlorhexidine 0.12% Liquid 15 milliLiter(s) Oral Mucosa every 12 hours  chlorhexidine 2% Cloths 1 Application(s) Topical <User Schedule>  chlorhexidine 4% Liquid 1 Application(s) Topical <User Schedule>  dexMEDEtomidine Infusion 0.2 MICROgram(s)/kG/Hr (3.6 mL/Hr) IV Continuous <Continuous>  enoxaparin Injectable 40 milliGRAM(s) SubCutaneous daily  folic acid 1 milliGRAM(s) Oral daily  metoprolol tartrate 25 milliGRAM(s) Oral two times a day  multivitamin 1 Tablet(s) Oral daily  norepinephrine Infusion 0.05 MICROgram(s)/kG/Min (6.74 mL/Hr) IV Continuous <Continuous>  pantoprazole   Suspension 40 milliGRAM(s) Oral daily  piperacillin/tazobactam IVPB.. 3.375 Gram(s) IV Intermittent every 8 hours  sucralfate suspension 1 Gram(s) Oral four times a day  thiamine 100 milliGRAM(s) Oral daily    MEDICATIONS  (PRN):  acetaminophen   Tablet .. 650 milliGRAM(s) Oral every 4 hours PRN Temp greater or equal to 38C (100.4F), Mild Pain (1 - 3)  PHENobarbital Injectable 130 milliGRAM(s) IntraMuscular every 6 hours PRN agitation  sodium chloride 0.9% lock flush 10 milliLiter(s) IV Push every 1 hour PRN Pre/post blood products, medications, blood draw, and to maintain line patency    Pertinent Labs: 04-03 Na 144 mmol/L Glu 117 mg/dL<H> K+ 4.0 mmol/L Cr 2.40 mg/dL<H> BUN 25 mg/dL<H> Phos 5.3 mg/dL<H> Alb 2.9 g/dL<L> PAB n/a   Hgb 12.2 g/dL<L> Hct 39.9 % HgA1C n/a    Glucose, Serum: 117 mg/dL <H>  Glucose, Serum: 111 mg/dL <H>   24Hr FS:107 mg/dL    Skin: intact    Estimated Needs:   [ ] no change since previous assessment  [x ] recalculated: Energy needs 2019-2355kcal (30-35kcal/kg IBW) & protein needs (1.2-1.4gm/kg IBW) & Fluid needs 1700-2000ml (25-30ml/kg IBW)    Previous Nutrition Diagnosis: Malnutrition Moderate Malnutrition in the context of Acute illness.   Etiology Inadequate energy/protein intake related to ETOH withdrawal.   Signs/Symptoms 1+ Edema (R) foot ; < 75 % of nutrition needs x 7 days.     Goal/Expected Outcome Pt will Meet calorie and protein needs > 75% via meals/ supplements; goal not applicable  NEW GOAL; Pt will meet >75% energy & protein via TF      Nutrition Diagnosis is [x ] ongoing  (s/s; <50% nutritional needs x 8 days & mild fat loss & mild-moderate muscle wasting)     New Nutrition Diagnosis: [x ] not applicable       Interventions:   Recommend  [ ] Continue:  [ ] Change Diet To:  [ ] Nutrition Supplement:  [x ] Nutrition Support: Consider Bolus NGT Feeding Vital AF 1.2 240ml [id number], 1729kcal & 108gm protein  [ ] Other:     Monitoring and Evaluation:   [ ] PO intake [ x ] Tolerance to diet prescription [ x ] weights [ x ] labs[ x ] follow up per protocol  [ ] other: Assessment: Pt seen for follow-up & CCU admission . Pt with Alcohol intoxication with withdrawl s/p intubated 4/2 for acute hypoxemia, resp failure likely due to aspiration. Noted pt COVID-19 negative.    Factors impacting intake: [ ] none [ ] nausea  [ ] vomiting [ ] diarrhea [ ] constipation  [ ]chewing problems [ ] swallowing issues  [x ] other: acute hypoxia    Diet Prescription: Diet, NPO:   Except Medications (04-03-20 @ 12:29)    Intake:  Pt NPO since 4/2. Noted po intake 0-25% full liquids 3/26-4/2. PA requesting pt start NGT feeding. Last BM x 193/27). No BM x 1 week    Current Weight: Wt=71.9kg(3/26), Wt=70kg(4/3)  % Weight Change  1.9kg wt gain x 8 days.     Physical appearance:  +1 edema Rt foot; Nutrition focused physical exam conducted: Subcutaneous fat loss: [WNL ] Orbital fat pads region, [Unable ]Buccal fat region, [WNL ]Triceps region,  [WNL ]Ribs region.  Muscle wasting: [Moderate ]Temples region, [Mild ]Clavicle region, [Mild ]Shoulder region, [ Unable]Scapula region, [Edematous ]Interosseous region,  [ Moderate]thigh region, [WNL ]Calf region    Pertinent Medications: MEDICATIONS  (STANDING):  atorvastatin 20 milliGRAM(s) Oral at bedtime  chlorhexidine 0.12% Liquid 15 milliLiter(s) Oral Mucosa every 12 hours  chlorhexidine 2% Cloths 1 Application(s) Topical <User Schedule>  chlorhexidine 4% Liquid 1 Application(s) Topical <User Schedule>  dexMEDEtomidine Infusion 0.2 MICROgram(s)/kG/Hr (3.6 mL/Hr) IV Continuous <Continuous>  enoxaparin Injectable 40 milliGRAM(s) SubCutaneous daily  folic acid 1 milliGRAM(s) Oral daily  metoprolol tartrate 25 milliGRAM(s) Oral two times a day  multivitamin 1 Tablet(s) Oral daily  norepinephrine Infusion 0.05 MICROgram(s)/kG/Min (6.74 mL/Hr) IV Continuous <Continuous>  pantoprazole   Suspension 40 milliGRAM(s) Oral daily  piperacillin/tazobactam IVPB.. 3.375 Gram(s) IV Intermittent every 8 hours  sucralfate suspension 1 Gram(s) Oral four times a day  thiamine 100 milliGRAM(s) Oral daily    MEDICATIONS  (PRN):  acetaminophen   Tablet .. 650 milliGRAM(s) Oral every 4 hours PRN Temp greater or equal to 38C (100.4F), Mild Pain (1 - 3)  PHENobarbital Injectable 130 milliGRAM(s) IntraMuscular every 6 hours PRN agitation  sodium chloride 0.9% lock flush 10 milliLiter(s) IV Push every 1 hour PRN Pre/post blood products, medications, blood draw, and to maintain line patency    Pertinent Labs: 04-03 Na 144 mmol/L Glu 117 mg/dL<H> K+ 4.0 mmol/L Cr 2.40 mg/dL<H> BUN 25 mg/dL<H> Phos 5.3 mg/dL<H> Alb 2.9 g/dL<L> PAB n/a   Hgb 12.2 g/dL<L> Hct 39.9 % HgA1C n/a    Glucose, Serum: 117 mg/dL <H>  Glucose, Serum: 111 mg/dL <H>   24Hr FS:107 mg/dL    Skin: intact    Estimated Needs:   [ ] no change since previous assessment  [x ] recalculated: Energy needs 2019-2355kcal (30-35kcal/kg IBW) & protein needs (1.2-1.4gm/kg IBW) & Fluid needs 1700-2000ml (25-30ml/kg IBW)    Previous Nutrition Diagnosis: Malnutrition Moderate Malnutrition in the context of Acute illness.   Etiology Inadequate energy/protein intake related to ETOH withdrawal.   Signs/Symptoms 1+ Edema (R) foot ; < 75 % of nutrition needs x 7 days.     Goal/Expected Outcome Pt will Meet calorie and protein needs > 75% via meals/ supplements; goal not applicable  NEW GOAL; Pt will meet >75% energy & protein via TF      Nutrition Diagnosis is [x ] ongoing  (s/s; <50% nutritional needs x 8 days & mild fat loss & mild-moderate muscle wasting)     New Nutrition Diagnosis: [x ] not applicable       Interventions:   Recommend  [ ] Continue:  [ ] Change Diet To:  [ ] Nutrition Supplement:  [x ] Nutrition Support: Consider Bolus NGT Feeding Vital AF 1.2 240ml [id number], 1729kcal & 108gm protein  [ ] Other:     Monitoring and Evaluation:   [ ] PO intake [ x ] Tolerance to diet prescription [ x ] weights [ x ] labs[ x ] follow up per protocol  [ ] other: Assessment: Pt seen for follow-up & CCU admission . Pt with Alcohol intoxication with withdrawal s/p intubated 4/2 for acute hypoxemia, resp failure likely due to aspiration. Noted pt COVID-19 negative.    Factors impacting intake: [ ] none [ ] nausea  [ ] vomiting [ ] diarrhea [ ] constipation  [ ]chewing problems [ ] swallowing issues  [x ] other: acute hypoxia    Diet Prescription: Diet, NPO:   Except Medications (04-03-20 @ 12:29)    Intake:  Pt NPO since 4/2. Noted po intake 0-25% full liquids 3/26-4/2. PA requesting pt start NGT feeding. Last BM x 193/27). No BM x 1 week    Current Weight: Wt=71.9kg(3/26), Wt=70kg(4/3)  % Weight Change  1.9kg wt gain x 8 days.     Physical appearance:  +1 edema Rt foot; Nutrition focused physical exam conducted: Subcutaneous fat loss: [WNL ] Orbital fat pads region, [Unable ]Buccal fat region, [WNL ]Triceps region,  [WNL ]Ribs region.  Muscle wasting: [Moderate ]Temples region, [Mild ]Clavicle region, [Mild ]Shoulder region, [ Unable]Scapula region, [Edematous ]Interosseous region,  [ Moderate]thigh region, [WNL ]Calf region    Pertinent Medications: MEDICATIONS  (STANDING):  atorvastatin 20 milliGRAM(s) Oral at bedtime  chlorhexidine 0.12% Liquid 15 milliLiter(s) Oral Mucosa every 12 hours  chlorhexidine 2% Cloths 1 Application(s) Topical <User Schedule>  chlorhexidine 4% Liquid 1 Application(s) Topical <User Schedule>  dexMEDEtomidine Infusion 0.2 MICROgram(s)/kG/Hr (3.6 mL/Hr) IV Continuous <Continuous>  enoxaparin Injectable 40 milliGRAM(s) SubCutaneous daily  folic acid 1 milliGRAM(s) Oral daily  metoprolol tartrate 25 milliGRAM(s) Oral two times a day  multivitamin 1 Tablet(s) Oral daily  norepinephrine Infusion 0.05 MICROgram(s)/kG/Min (6.74 mL/Hr) IV Continuous <Continuous>  pantoprazole   Suspension 40 milliGRAM(s) Oral daily  piperacillin/tazobactam IVPB.. 3.375 Gram(s) IV Intermittent every 8 hours  sucralfate suspension 1 Gram(s) Oral four times a day  thiamine 100 milliGRAM(s) Oral daily    MEDICATIONS  (PRN):  acetaminophen   Tablet .. 650 milliGRAM(s) Oral every 4 hours PRN Temp greater or equal to 38C (100.4F), Mild Pain (1 - 3)  PHENobarbital Injectable 130 milliGRAM(s) IntraMuscular every 6 hours PRN agitation  sodium chloride 0.9% lock flush 10 milliLiter(s) IV Push every 1 hour PRN Pre/post blood products, medications, blood draw, and to maintain line patency    Pertinent Labs: 04-03 Na 144 mmol/L Glu 117 mg/dL<H> K+ 4.0 mmol/L Cr 2.40 mg/dL<H> BUN 25 mg/dL<H> Phos 5.3 mg/dL<H> Alb 2.9 g/dL<L> PAB n/a   Hgb 12.2 g/dL<L> Hct 39.9 % HgA1C n/a    Glucose, Serum: 117 mg/dL <H>  Glucose, Serum: 111 mg/dL <H>   24Hr FS:107 mg/dL    Skin: intact    Estimated Needs:   [ ] no change since previous assessment  [x ] recalculated: Energy needs 2019-2355kcal (30-35kcal/kg IBW) & protein needs (1.2-1.4gm/kg IBW) & Fluid needs 1700-2000ml (25-30ml/kg IBW)    Previous Nutrition Diagnosis: Malnutrition Moderate Malnutrition in the context of Acute illness.   Etiology Inadequate energy/protein intake related to ETOH withdrawal.   Signs/Symptoms 1+ Edema (R) foot ; < 75 % of nutrition needs x 7 days.     Goal/Expected Outcome Pt will Meet calorie and protein needs > 75% via meals/ supplements; goal not applicable  NEW GOAL; Pt will meet >75% energy & protein via TF      Nutrition Diagnosis is [x ] ongoing  (s/s; <50% nutritional needs x 8 days & mild fat loss & mild-moderate muscle wasting)     New Nutrition Diagnosis: [x ] not applicable       Interventions:   Recommend  [ ] Continue:  [ ] Change Diet To:  [ ] Nutrition Supplement:  [x ] Nutrition Support: Consider Bolus NGT Feeding Vital AF 1.2 240ml [id number], 1729kcal & 108gm protein  [ ] Other:     Monitoring and Evaluation:   [ ] PO intake [ x ] Tolerance to diet prescription [ x ] weights [ x ] labs[ x ] follow up per protocol  [ ] other:

## 2020-04-03 NOTE — CONSULT NOTE ADULT - SUBJECTIVE AND OBJECTIVE BOX
Infectious Diseases - Attending at Dr. Sheriff    HPI:  54 year old male, PMH Etoh abuse, HTN, gastritis presents to the ED with complains of abdominal pain and vomiting that started 3 days ago. Patient reports pain in 8/10, constant, dull and located in epigastric area. Denies chest pain, SOB, palpitations, cough or diarrhea. Patient reports he did not have alcohol to drink in three weeks. BAL on admission is 369. (26 Mar 2020 13:09)  Discussed case in Dr Kingsley yesterday ,pt initial test was COVID negative but pt continued to have fever so I requested a CT chest Appears pt worsened and got intubated and now is in ICU   COVID second time is negative as well   Pt on antibiotics for a possible aspiration pnuemonia      PAST MEDICAL & SURGICAL HISTORY:  DTs (delirium tremens)  Substance abuse  Gastritis  EtOH dependence  Mixed hyperlipidemia  PUD (peptic ulcer disease)  No significant past surgical history      Allergies    No Known Allergies    Intolerances        FAMILY HISTORY:  unknown      SOCIAL HISTORY: unknwon     REVIEW OF SYSTEMS:    intubated     MEDICATIONS  (STANDING):  atorvastatin 20 milliGRAM(s) Oral at bedtime  chlorhexidine 2% Cloths 1 Application(s) Topical <User Schedule>  chlorhexidine 4% Liquid 1 Application(s) Topical <User Schedule>  dexMEDEtomidine Infusion 0.5 MICROgram(s)/kG/Hr (8.99 mL/Hr) IV Continuous <Continuous>  enoxaparin Injectable 40 milliGRAM(s) SubCutaneous daily  folic acid 1 milliGRAM(s) Oral daily  metoprolol tartrate 25 milliGRAM(s) Oral two times a day  multivitamin 1 Tablet(s) Oral daily  pantoprazole   Suspension 40 milliGRAM(s) Oral daily  piperacillin/tazobactam IVPB.. 3.375 Gram(s) IV Intermittent every 8 hours  sucralfate suspension 1 Gram(s) Oral four times a day  thiamine 100 milliGRAM(s) Oral daily    MEDICATIONS  (PRN):  acetaminophen   Tablet .. 650 milliGRAM(s) Oral every 4 hours PRN Temp greater or equal to 38C (100.4F), Mild Pain (1 - 3)  PHENobarbital Injectable 130 milliGRAM(s) IV Push every 6 hours PRN agitation  sodium chloride 0.9% lock flush 10 milliLiter(s) IV Push every 1 hour PRN Pre/post blood products, medications, blood draw, and to maintain line patency      Vital Signs Last 24 Hrs    T(C): 37 (2020 07:53), Max: 38.9 (2020 20:30)  T(F): 98.6 (2020 07:53), Max: 102 (2020 20:30)  HR: 91 (2020 11:40) (90 - 136)  BP: 104/72 (2020 11:40) (76/58 - 127/72)  BP(mean): 61 (2020 08:00) (61 - 93)  ABP: --  ABP(mean): --  RR: 25 (2020 04:00) (18 - 30)  SpO2: 98% (2020 11:37) (86% - 99%)    PHYSICAL EXAM:    Constitutional: NAD, intubated ,well-developed  HEENT: PERRLA, EOMI, Normal Hearing, MMM  Neck: supple  Respiratory: CTAB/L, on vent  Cardiovascular: S1 and S2, RRR, no M/G/R  Gastrointestinal: BS+, soft, NT/ND  Extremities: No peripheral edema  Vascular: 2+ peripheral pulses  Neurological: cant be exmained  Skin: No rashes      LABS:    -    144  |  110<H>  |  25<H>  ----------------------------<  117<H>  4.0   |  25  |  2.40<H>    Ca    8.7      2020 03:23  Phos  5.3     04-03  Mg     1.8     04-03    TPro  8.0  /  Alb  2.9<L>  /  TBili  0.4  /  DBili  x   /  AST  22  /  ALT  21  /  AlkPhos  41  04-03    LIVER FUNCTIONS - ( 2020 03:23 )  Alb: 2.9 g/dL / Pro: 8.0 gm/dL / ALK PHOS: 41 U/L / ALT: 21 U/L / AST: 22 U/L / GGT: x           CBC:                        12.2   6.35  )-----------( 334      ( 2020 03:23 )             39.9     Coags:  PT/INR - ( 2020 08:23 )   PT: 14.8 sec;   INR: 1.31 ratio         PTT - ( 2020 08:23 )  PTT:33.5 sec        Urinalysis Basic - ( 2020 03:29 )    Color: Yellow / Appearance: Slightly Turbid / S.020 / pH: x  Gluc: x / Ketone: Trace  / Bili: Negative / Urobili: Negative mg/dL   Blood: x / Protein: 100 mg/dL / Nitrite: Negative   Leuk Esterase: Trace / RBC: 0-2 /HPF / WBC 6-10   Sq Epi: x / Non Sq Epi: Few / Bacteria: Few        MICROBIOLOGY:  RECENT CULTURES:   .Blood Blood XXXX XXXX   No growth to date.     .Blood Blood XXXX XXXX   No growth to date.          RADIOLOGY & ADDITIONAL STUDIES:    INTERPRETATION:  Single AP view of the chest.    CLINICAL INFORMATION: Fever    FINDINGS:  There are faint hazy opacities throughout the right lung.There is an endotracheal tube with the tip seen two vertebral bodies above the javon.  There is an NG tube seen with the tip in the stomach. There is a right internal jugular central line with the tip in the region of the SVC.

## 2020-04-03 NOTE — CHART NOTE - NSCHARTNOTEFT_GEN_A_CORE
Charting and recommendations based on EMR review at offsite location.     Ideal body weight:  66kg                           6-8mL/k-528  Mode: AC/ CMV (Assist Control/ Continuous Mandatory Ventilation), RR (machine): 25, TV (machine): 400, FiO2: 40, PEEP: 10, ITime: 1, MAP: 16, PIP: 27  Plateau pressure: N/A  ABG - ( 2020 19:07 )  pH, Arterial: x     pH, Blood: 7.29  /  pCO2: 49    /  pO2: 141   / HCO3: 23    / Base Excess: -3.7  /  SaO2: 99        T(C): 36.8 (20 @ 04:01), Max: 38.9 (20 @ 20:30)  HR: 113 (20 @ 04:00) (111 - 136)  BP: 92/52 (20 @ 05:37) (82/59 - 130/80)  RR: 25 (20 @ 04:00) (18 - 30)  SpO2: 99% (20 @ 04:00) (86% - 99%)    Sedation: Propofol   Paralytic: N  Plaquenil: 4/2--  CRP: 30  Steroids: Y    Recommendations:  --Consider adjuvant analgesia  --Pt CoVID-19 negative x2, consider discontinuation of Plaquenil   --Consider increasing tidal volume is acidosis worsens

## 2020-04-03 NOTE — CONSULT NOTE ADULT - SUBJECTIVE AND OBJECTIVE BOX
[facility] NEPHROLOGY SERVICES, [facility]  NEPHROLOGY AND HYPERTENSION  [address]  [address]  [place] [address]  [phone]    MD JACEY BUSTAMANTE MD ANDREY GONCHARUK, MD MADHU KORRAPATI, MD YELENA ROSENBERG, MD BINNY KOSHY, MD CHRISTOPHER CAPUTO, MD EDWARD BOVER, MD      Information from chart:  "Patient is a 54y old  Male who presents with a chief complaint of Abdominal Pain (2020 12:26)    HPI:  54 year old male, PMH Etoh abuse, HTN, gastritis presents to the ED with complains of abdominal pain and vomiting that started 3 days ago. Patient reports pain in 8/10, constant, dull and located in epigastric area. Denies chest pain, SOB, palpitations, cough or diarrhea. Patient reports he did not have alcohol to drink in three weeks. BAL on admission is 369. (26 Mar 2020 13:09)   "    Patient intubated sedated  Noted hypotensive episodes      PAST MEDICAL & SURGICAL HISTORY:  DTs (delirium tremens)  Substance abuse  Gastritis  EtOH dependence  Mixed hyperlipidemia  PUD (peptic ulcer disease)  No significant past surgical history    FAMILY HISTORY:  No pertinent family history in first degree relatives (Father, Mother)    Allergies    No Known Allergies    Intolerances      Home Medications:  atorvastatin 20 mg oral tablet: 1 tab(s) orally once a day (at bedtime) (03 Mar 2020 10:19)  folic acid 1 mg oral tablet: 1 tab(s) orally once a day (03 Mar 2020 10:19)  Multiple Vitamins oral tablet: 1 tab(s) orally once a day (03 Mar 2020 10:19)  thiamine 100 mg oral tablet: 1 tab(s) orally once a day (03 Mar 2020 10:19)    MEDICATIONS  (STANDING):  atorvastatin 20 milliGRAM(s) Oral at bedtime  chlorhexidine 0.12% Liquid 15 milliLiter(s) Oral Mucosa every 12 hours  chlorhexidine 2% Cloths 1 Application(s) Topical <User Schedule>  chlorhexidine 4% Liquid 1 Application(s) Topical <User Schedule>  dexMEDEtomidine Infusion 0.2 MICROgram(s)/kG/Hr (3.6 mL/Hr) IV Continuous <Continuous>  enoxaparin Injectable 40 milliGRAM(s) SubCutaneous daily  folic acid 1 milliGRAM(s) Oral daily  metoprolol tartrate 25 milliGRAM(s) Oral two times a day  multivitamin 1 Tablet(s) Oral daily  norepinephrine Infusion 0.05 MICROgram(s)/kG/Min (6.74 mL/Hr) IV Continuous <Continuous>  pantoprazole   Suspension 40 milliGRAM(s) Oral daily  piperacillin/tazobactam IVPB.. 3.375 Gram(s) IV Intermittent every 8 hours  sucralfate suspension 1 Gram(s) Oral four times a day  thiamine 100 milliGRAM(s) Oral daily    MEDICATIONS  (PRN):  acetaminophen   Tablet .. 650 milliGRAM(s) Oral every 4 hours PRN Temp greater or equal to 38C (100.4F), Mild Pain (1 - 3)  PHENobarbital Injectable 130 milliGRAM(s) IntraMuscular every 6 hours PRN agitation  sodium chloride 0.9% lock flush 10 milliLiter(s) IV Push every 1 hour PRN Pre/post blood products, medications, blood draw, and to maintain line patency    Vital Signs Last 24 Hrs  T(C): 36.7 (2020 16:18), Max: 38.9 (2020 20:30)  T(F): 98 (2020 16:18), Max: 102 (2020 20:30)  HR: 78 (2020 17:35) (74 - 136)  BP: 100/70 (2020 17:40) (76/58 - 127/72)  BP(mean): 80 (2020 17:40) (61 - 93)  RR: 25 (2020 17:40) (15 - 30)  SpO2: 98% (2020 17:40) (92% - 100%)  Mode: CPAP with PS  FiO2: 40  PEEP: 5  PS: 5  MAP: 7.1  PIP: 11    Daily     Daily Weight in k (2020 04:01)    20 @ 07:01  -  20 @ 18:38  --------------------------------------------------------  IN: 572 mL / OUT: 300 mL / NET: 272 mL      CAPILLARY BLOOD GLUCOSE        PHYSICAL EXAM:      T(C): 36.7 (20 @ 16:18), Max: 38.9 (20 @ 20:30)  HR: 78 (20 @ 17:35) (74 - 136)  BP: 100/70 (20 @ 17:40) (76/58 - 127/72)  RR: 25 (20 @ 17:40) (15 - 30)  SpO2: 98% (20 @ 17:40) (92% - 100%)  Wt(kg): --    Extremities:  1  edema      COVID-19 PCR . (20 @ 17:33)    COVID-19 PCR: NotDetec: This test has been validated by [facility] to be accurate;  though it has not been FDA cleared/approved by the usual pathway.  As with all laboratory tests, results should be correlated with clinical                  144  |  110<H>  |  25<H>  ----------------------------<  117<H>  4.0   |  25  |  2.40<H>    Ca    8.7      2020 03:23  Phos  5.3     -  Mg     1.8     -    TPro  8.0  /  Alb  2.9<L>  /  TBili  0.4  /  DBili  x   /  AST  22  /  ALT  21  /  AlkPhos  41  -03                          12.2   6.35  )-----------( 334      ( 2020 03:23 )             39.9     Creatinine Trend: 2.40<--, 0.95<--, 0.77<--, 0.72<--, 0.66<--, 0.96<--    ABG - ( 2020 19:07 )  pH, Arterial: x     pH, Blood: 7.29  /  pCO2: 49    /  pO2: 141   / HCO3: 23    / Base Excess: -3.7  /  SaO2: 99                    Assessment   ROSA pre renal azotemia, r/o ischemic ATN;   COVID neg; PNA; r/o occult pulm renal disease  Risk for HRS if liver cirrhosis underlying    Plan  Follow Urine Na, if < 20 add Octreotide; SPA;   Midodrine when off pressors  Serologies;   Will follow course    Misha Abebe MD

## 2020-04-03 NOTE — PROGRESS NOTE ADULT - SUBJECTIVE AND OBJECTIVE BOX
HPI:  54 year old male, PMH Etoh abuse, HTN, gastritis presents to the ED with complains of abdominal pain and vomiting that started 3 days ago. Patient reports pain in 8/10, constant, dull and located in epigastric area. Denies chest pain, SOB, palpitations, cough or diarrhea. Patient reports he did not have alcohol to drink in three weeks. BAL on admission is 369. (26 Mar 2020 13:09)      24 hr events: No acute events.     ## ROS:  [x ] unable to obtain    ## Vitals  ICU Vital Signs Last 24 Hrs  T(C): 37 (03 Apr 2020 07:53), Max: 38.9 (02 Apr 2020 20:30)  T(F): 98.6 (03 Apr 2020 07:53), Max: 102 (02 Apr 2020 20:30)  HR: 91 (03 Apr 2020 11:40) (90 - 136)  BP: 104/72 (03 Apr 2020 11:40) (76/58 - 127/72)  BP(mean): 61 (03 Apr 2020 08:00) (61 - 93)  ABP: --  ABP(mean): --  RR: 25 (03 Apr 2020 04:00) (18 - 30)  SpO2: 98% (03 Apr 2020 11:37) (86% - 99%)      ## Physical Exam:  Gen:  HEENT:  Resp:  CV:  Abd:  Ext:  Neuro:    ## Vent Data  Mode: AC/ CMV (Assist Control/ Continuous Mandatory Ventilation)  RR (machine): 25  TV (machine): 400  FiO2: 40  PEEP: 10  ITime: 11  MAP: 16  PIP: 25      ## Labs:  Chem:  04-03    144  |  110<H>  |  25<H>  ----------------------------<  117<H>  4.0   |  25  |  2.40<H>    Ca    8.7      03 Apr 2020 03:23  Phos  5.3     04-03  Mg     1.8     04-03    TPro  8.0  /  Alb  2.9<L>  /  TBili  0.4  /  DBili  x   /  AST  22  /  ALT  21  /  AlkPhos  41  04-03    LIVER FUNCTIONS - ( 03 Apr 2020 03:23 )  Alb: 2.9 g/dL / Pro: 8.0 gm/dL / ALK PHOS: 41 U/L / ALT: 21 U/L / AST: 22 U/L / GGT: x           CBC:                        12.2   6.35  )-----------( 334      ( 03 Apr 2020 03:23 )             39.9     Coags:  PT/INR - ( 02 Apr 2020 08:23 )   PT: 14.8 sec;   INR: 1.31 ratio         PTT - ( 02 Apr 2020 08:23 )  PTT:33.5 sec        ## Cardiac        ## Blood Gas  ABG - ( 02 Apr 2020 19:07 )  pH, Arterial: x     pH, Blood: 7.29  /  pCO2: 49    /  pO2: 141   / HCO3: 23    / Base Excess: -3.7  /  SaO2: 99                  #I/Os  I&O's Detail      ## Imaging:    ## Medications:  MEDICATIONS  (STANDING):  atorvastatin 20 milliGRAM(s) Oral at bedtime  azithromycin   Tablet 500 milliGRAM(s) Oral daily  chlorhexidine 0.12% Liquid 15 milliLiter(s) Oral Mucosa every 12 hours  chlorhexidine 2% Cloths 1 Application(s) Topical <User Schedule>  dexMEDEtomidine Infusion 0.2 MICROgram(s)/kG/Hr (3.6 mL/Hr) IV Continuous <Continuous>  enoxaparin Injectable 40 milliGRAM(s) SubCutaneous daily  folic acid 1 milliGRAM(s) Oral daily  hydroxychloroquine 200 milliGRAM(s) Oral every 12 hours  hydroxychloroquine   Oral   methylPREDNISolone sodium succinate Injectable 40 milliGRAM(s) IV Push every 12 hours  metoprolol tartrate 25 milliGRAM(s) Oral two times a day  multivitamin 1 Tablet(s) Oral daily  norepinephrine Infusion 0.05 MICROgram(s)/kG/Min (6.74 mL/Hr) IV Continuous <Continuous>  pantoprazole   Suspension 40 milliGRAM(s) Oral daily  piperacillin/tazobactam IVPB.. 3.375 Gram(s) IV Intermittent every 8 hours  propofol Infusion 20 MICROgram(s)/kG/Min (8.63 mL/Hr) IV Continuous <Continuous>  sucralfate suspension 1 Gram(s) Oral four times a day  thiamine 100 milliGRAM(s) Oral daily    MEDICATIONS  (PRN):  acetaminophen   Tablet .. 650 milliGRAM(s) Oral every 4 hours PRN Temp greater or equal to 38C (100.4F), Mild Pain (1 - 3)  guaiFENesin   Syrup  (Sugar-Free) 100 milliGRAM(s) Oral every 6 hours PRN Cough  PHENobarbital Injectable 130 milliGRAM(s) IntraMuscular every 6 hours PRN agitation  sodium chloride 0.9% lock flush 10 milliLiter(s) IV Push every 1 hour PRN Pre/post blood products, medications, blood draw, and to maintain line patency HPI:  54 year old male, PMH Etoh abuse, HTN, gastritis presents to the ED with complains of abdominal pain and vomiting that started 3 days ago. Patient reports pain in 8/10, constant, dull and located in epigastric area. Denies chest pain, SOB, palpitations, cough or diarrhea. Patient reports he did not have alcohol to drink in three weeks. BAL on admission is 369. (26 Mar 2020 13:09)      24 hr events: Intubated and transferred to ICU.    ## ROS:  [x ] unable to obtain    ## Vitals  ICU Vital Signs Last 24 Hrs  T(C): 37 (03 Apr 2020 07:53), Max: 38.9 (02 Apr 2020 20:30)  T(F): 98.6 (03 Apr 2020 07:53), Max: 102 (02 Apr 2020 20:30)  HR: 91 (03 Apr 2020 11:40) (90 - 136)  BP: 104/72 (03 Apr 2020 11:40) (76/58 - 127/72)  BP(mean): 61 (03 Apr 2020 08:00) (61 - 93)  ABP: --  ABP(mean): --  RR: 25 (03 Apr 2020 04:00) (18 - 30)  SpO2: 98% (03 Apr 2020 11:37) (86% - 99%)      ## Physical Exam:  Gen:  HEENT:  Resp:  CV:  Abd:  Ext:  Neuro:    ## Vent Data  Mode: AC/ CMV (Assist Control/ Continuous Mandatory Ventilation)  RR (machine): 25  TV (machine): 400  FiO2: 40  PEEP: 10  ITime: 11  MAP: 16  PIP: 25      ## Labs:  Chem:  04-03    144  |  110<H>  |  25<H>  ----------------------------<  117<H>  4.0   |  25  |  2.40<H>    Ca    8.7      03 Apr 2020 03:23  Phos  5.3     04-03  Mg     1.8     04-03    TPro  8.0  /  Alb  2.9<L>  /  TBili  0.4  /  DBili  x   /  AST  22  /  ALT  21  /  AlkPhos  41  04-03    LIVER FUNCTIONS - ( 03 Apr 2020 03:23 )  Alb: 2.9 g/dL / Pro: 8.0 gm/dL / ALK PHOS: 41 U/L / ALT: 21 U/L / AST: 22 U/L / GGT: x           CBC:                        12.2   6.35  )-----------( 334      ( 03 Apr 2020 03:23 )             39.9     Coags:  PT/INR - ( 02 Apr 2020 08:23 )   PT: 14.8 sec;   INR: 1.31 ratio         PTT - ( 02 Apr 2020 08:23 )  PTT:33.5 sec        ## Cardiac        ## Blood Gas  ABG - ( 02 Apr 2020 19:07 )  pH, Arterial: x     pH, Blood: 7.29  /  pCO2: 49    /  pO2: 141   / HCO3: 23    / Base Excess: -3.7  /  SaO2: 99                  #I/Os  I&O's Detail      ## Imaging:    ## Medications:  MEDICATIONS  (STANDING):  atorvastatin 20 milliGRAM(s) Oral at bedtime  azithromycin   Tablet 500 milliGRAM(s) Oral daily  chlorhexidine 0.12% Liquid 15 milliLiter(s) Oral Mucosa every 12 hours  chlorhexidine 2% Cloths 1 Application(s) Topical <User Schedule>  dexMEDEtomidine Infusion 0.2 MICROgram(s)/kG/Hr (3.6 mL/Hr) IV Continuous <Continuous>  enoxaparin Injectable 40 milliGRAM(s) SubCutaneous daily  folic acid 1 milliGRAM(s) Oral daily  hydroxychloroquine 200 milliGRAM(s) Oral every 12 hours  hydroxychloroquine   Oral   methylPREDNISolone sodium succinate Injectable 40 milliGRAM(s) IV Push every 12 hours  metoprolol tartrate 25 milliGRAM(s) Oral two times a day  multivitamin 1 Tablet(s) Oral daily  norepinephrine Infusion 0.05 MICROgram(s)/kG/Min (6.74 mL/Hr) IV Continuous <Continuous>  pantoprazole   Suspension 40 milliGRAM(s) Oral daily  piperacillin/tazobactam IVPB.. 3.375 Gram(s) IV Intermittent every 8 hours  propofol Infusion 20 MICROgram(s)/kG/Min (8.63 mL/Hr) IV Continuous <Continuous>  sucralfate suspension 1 Gram(s) Oral four times a day  thiamine 100 milliGRAM(s) Oral daily    MEDICATIONS  (PRN):  acetaminophen   Tablet .. 650 milliGRAM(s) Oral every 4 hours PRN Temp greater or equal to 38C (100.4F), Mild Pain (1 - 3)  guaiFENesin   Syrup  (Sugar-Free) 100 milliGRAM(s) Oral every 6 hours PRN Cough  PHENobarbital Injectable 130 milliGRAM(s) IntraMuscular every 6 hours PRN agitation  sodium chloride 0.9% lock flush 10 milliLiter(s) IV Push every 1 hour PRN Pre/post blood products, medications, blood draw, and to maintain line patency HPI:  54 year old male, PMH Etoh abuse, HTN, gastritis presents to the ED with complains of abdominal pain and vomiting that started 3 days ago. Patient reports pain in 8/10, constant, dull and located in epigastric area. Denies chest pain, SOB, palpitations, cough or diarrhea. Patient reports he did not have alcohol to drink in three weeks. BAL on admission is 369. (26 Mar 2020 13:09)      24 hr events: Intubated and transferred to ICU.    ## ROS:  [x ] unable to obtain    ## Vitals  ICU Vital Signs Last 24 Hrs  T(C): 37 (03 Apr 2020 07:53), Max: 38.9 (02 Apr 2020 20:30)  T(F): 98.6 (03 Apr 2020 07:53), Max: 102 (02 Apr 2020 20:30)  HR: 91 (03 Apr 2020 11:40) (90 - 136)  BP: 104/72 (03 Apr 2020 11:40) (76/58 - 127/72)  BP(mean): 61 (03 Apr 2020 08:00) (61 - 93)  ABP: --  ABP(mean): --  RR: 25 (03 Apr 2020 04:00) (18 - 30)  SpO2: 98% (03 Apr 2020 11:37) (86% - 99%)      ## Physical Exam:  Gen: Adult male lying in bed, intubated, sedated  HEENT: NC/AT ET tube in place  Resp: Mechanical breath sounds b/l  CV: S1, S2  Abd: Soft  Ext: WWP  Neuro: Sedated, unarousable    ## Vent Data  Mode: AC/ CMV (Assist Control/ Continuous Mandatory Ventilation)  RR (machine): 25  TV (machine): 400  FiO2: 40  PEEP: 10  ITime: 11  MAP: 16  PIP: 25      ## Labs:  Chem:  04-03    144  |  110<H>  |  25<H>  ----------------------------<  117<H>  4.0   |  25  |  2.40<H>    Ca    8.7      03 Apr 2020 03:23  Phos  5.3     04-03  Mg     1.8     04-03    TPro  8.0  /  Alb  2.9<L>  /  TBili  0.4  /  DBili  x   /  AST  22  /  ALT  21  /  AlkPhos  41  04-03    LIVER FUNCTIONS - ( 03 Apr 2020 03:23 )  Alb: 2.9 g/dL / Pro: 8.0 gm/dL / ALK PHOS: 41 U/L / ALT: 21 U/L / AST: 22 U/L / GGT: x           CBC:                        12.2   6.35  )-----------( 334      ( 03 Apr 2020 03:23 )             39.9     Coags:  PT/INR - ( 02 Apr 2020 08:23 )   PT: 14.8 sec;   INR: 1.31 ratio         PTT - ( 02 Apr 2020 08:23 )  PTT:33.5 sec        ## Cardiac        ## Blood Gas  ABG - ( 02 Apr 2020 19:07 )  pH, Arterial: x     pH, Blood: 7.29  /  pCO2: 49    /  pO2: 141   / HCO3: 23    / Base Excess: -3.7  /  SaO2: 99                  #I/Os  I&O's Detail      ## Imaging:    ## Medications:  MEDICATIONS  (STANDING):  atorvastatin 20 milliGRAM(s) Oral at bedtime  azithromycin   Tablet 500 milliGRAM(s) Oral daily  chlorhexidine 0.12% Liquid 15 milliLiter(s) Oral Mucosa every 12 hours  chlorhexidine 2% Cloths 1 Application(s) Topical <User Schedule>  dexMEDEtomidine Infusion 0.2 MICROgram(s)/kG/Hr (3.6 mL/Hr) IV Continuous <Continuous>  enoxaparin Injectable 40 milliGRAM(s) SubCutaneous daily  folic acid 1 milliGRAM(s) Oral daily  hydroxychloroquine 200 milliGRAM(s) Oral every 12 hours  hydroxychloroquine   Oral   methylPREDNISolone sodium succinate Injectable 40 milliGRAM(s) IV Push every 12 hours  metoprolol tartrate 25 milliGRAM(s) Oral two times a day  multivitamin 1 Tablet(s) Oral daily  norepinephrine Infusion 0.05 MICROgram(s)/kG/Min (6.74 mL/Hr) IV Continuous <Continuous>  pantoprazole   Suspension 40 milliGRAM(s) Oral daily  piperacillin/tazobactam IVPB.. 3.375 Gram(s) IV Intermittent every 8 hours  propofol Infusion 20 MICROgram(s)/kG/Min (8.63 mL/Hr) IV Continuous <Continuous>  sucralfate suspension 1 Gram(s) Oral four times a day  thiamine 100 milliGRAM(s) Oral daily    MEDICATIONS  (PRN):  acetaminophen   Tablet .. 650 milliGRAM(s) Oral every 4 hours PRN Temp greater or equal to 38C (100.4F), Mild Pain (1 - 3)  guaiFENesin   Syrup  (Sugar-Free) 100 milliGRAM(s) Oral every 6 hours PRN Cough  PHENobarbital Injectable 130 milliGRAM(s) IntraMuscular every 6 hours PRN agitation  sodium chloride 0.9% lock flush 10 milliLiter(s) IV Push every 1 hour PRN Pre/post blood products, medications, blood draw, and to maintain line patency

## 2020-04-03 NOTE — PROGRESS NOTE ADULT - ASSESSMENT
55 y/o M w/hx of alcohol abuse w/DTs admitted for alcohol intoxication now w/withdrawal, intubated overnight for acute hypoxemic respiratory failure likely secondary to aspiration. Hypotension likely secondary to sedation/severe sepsis with septic shock due to aspiration PNA.    - Precedex gtt, phenobarb for alcohol withdrawal with delirium  - Daily SAT/SBT  - Wean from vent as tolerated  - Wean pressors as tolerated goal MAP >= 65  - Broad spectrum abx  - Follow up cultures  - MVI/thiamine/folate    I have personally provided 35 minutes of attending critical care time excluding procedures. 55 y/o M w/hx of alcohol abuse w/DTs admitted for alcohol intoxication now w/withdrawal, intubated overnight for acute hypoxemic respiratory failure likely secondary to aspiration. Hypotension likely secondary to sedation/severe sepsis with septic shock due to aspiration PNA. ROSA likely secondary to ATN/sepsis.     - Precedex gtt, phenobarb for alcohol withdrawal with delirium  - Daily SAT/SBT  - Wean from vent as tolerated  - Wean pressors as tolerated goal MAP >= 65  - Broad spectrum abx  - Follow up cultures  - MVI/thiamine/folate  - Trend Cr, avoid nephrotoxins    I have personally provided 35 minutes of attending critical care time excluding procedures.

## 2020-04-03 NOTE — CONSULT NOTE ADULT - ASSESSMENT
54 yr old alcoholic male seen with   1.Acute respiratory failure : with negative COVID twice  plan was to do a CT chest to r/o aspiration pneumonia vs COVID  pneumonia (more sp GGO )   couldn't be done as pt became unstable and intubated   now on zosyn and vanco for a possible bacterial pneumonias COVID neg twice  was started on Plaquenil which was then discontinued by ICU   cont management per ICU   2.Aspiraiton pneumonia : in the setting of neg covid twice and pt is alcoholic so high chances of developing aspiration   sputum c/s MRSA PCR   antibiotics as above    3.ROSA :sec to above/drugs  vanco sh be renally dosed 54 yr old alcoholic male seen with   1.Acute respiratory failure : with negative COVID twice  plan was to do a CT chest to r/o aspiration pneumonia vs COVID  pneumonia (more sp GGO )   couldn't be done as pt became unstable and intubated   now on zosyn and vanco for a possible bacterial pneumonias COVID neg twice  was started on Plaquenil which was then discontinued by ICU   cont management per ICU   2.Aspiraiton pneumonia : in the setting of neg covid twice and pt is alcoholic so high chances of developing aspiration   sputum c/s MRSA PCR   antibiotics as above    3.septic shock: sec to pneumonia   pressors  antibiotics as above  repeat c/s pending  intitial ones negative    4.ROSA :sec to above/drugs  vanco sh be renally dosed

## 2020-04-04 LAB
ALBUMIN SERPL ELPH-MCNC: 2.1 G/DL — LOW (ref 3.3–5)
ALP SERPL-CCNC: 64 U/L — SIGNIFICANT CHANGE UP (ref 40–120)
ALT FLD-CCNC: 14 U/L — SIGNIFICANT CHANGE UP (ref 12–78)
ANION GAP SERPL CALC-SCNC: 7 MMOL/L — SIGNIFICANT CHANGE UP (ref 5–17)
APPEARANCE UR: ABNORMAL
AST SERPL-CCNC: 14 U/L — LOW (ref 15–37)
AUTO DIFF PNL BLD: NEGATIVE — SIGNIFICANT CHANGE UP
BACTERIA # UR AUTO: ABNORMAL
BASOPHILS # BLD AUTO: 0.07 K/UL — SIGNIFICANT CHANGE UP (ref 0–0.2)
BASOPHILS NFR BLD AUTO: 0.6 % — SIGNIFICANT CHANGE UP (ref 0–2)
BILIRUB SERPL-MCNC: 0.3 MG/DL — SIGNIFICANT CHANGE UP (ref 0.2–1.2)
BILIRUB UR-MCNC: NEGATIVE — SIGNIFICANT CHANGE UP
BUN SERPL-MCNC: 30 MG/DL — HIGH (ref 7–23)
C-ANCA SER-ACNC: NEGATIVE — SIGNIFICANT CHANGE UP
C3 SERPL-MCNC: 143 MG/DL — SIGNIFICANT CHANGE UP (ref 81–157)
C4 SERPL-MCNC: 51 MG/DL — HIGH (ref 13–39)
CALCIUM SERPL-MCNC: 7.3 MG/DL — LOW (ref 8.5–10.1)
CHLORIDE SERPL-SCNC: 114 MMOL/L — HIGH (ref 96–108)
CO2 SERPL-SCNC: 23 MMOL/L — SIGNIFICANT CHANGE UP (ref 22–31)
COLOR SPEC: YELLOW — SIGNIFICANT CHANGE UP
CREAT ?TM UR-MCNC: 292 MG/DL — SIGNIFICANT CHANGE UP
CREAT SERPL-MCNC: 1.93 MG/DL — HIGH (ref 0.5–1.3)
DIFF PNL FLD: ABNORMAL
EOSINOPHIL # BLD AUTO: 0.17 K/UL — SIGNIFICANT CHANGE UP (ref 0–0.5)
EOSINOPHIL NFR BLD AUTO: 1.4 % — SIGNIFICANT CHANGE UP (ref 0–6)
EPI CELLS # UR: SIGNIFICANT CHANGE UP
FERRITIN SERPL-MCNC: 501 NG/ML — HIGH (ref 30–400)
GLUCOSE SERPL-MCNC: 107 MG/DL — HIGH (ref 70–99)
GLUCOSE UR QL: NEGATIVE MG/DL — SIGNIFICANT CHANGE UP
GRAN CASTS # UR COMP ASSIST: ABNORMAL /LPF
HAV IGM SER-ACNC: SIGNIFICANT CHANGE UP
HBV CORE IGM SER-ACNC: SIGNIFICANT CHANGE UP
HBV SURFACE AG SER-ACNC: SIGNIFICANT CHANGE UP
HCT VFR BLD CALC: 35.9 % — LOW (ref 39–50)
HCV AB S/CO SERPL IA: 0.14 S/CO — SIGNIFICANT CHANGE UP (ref 0–0.99)
HCV AB SERPL-IMP: SIGNIFICANT CHANGE UP
HGB BLD-MCNC: 11.4 G/DL — LOW (ref 13–17)
HYALINE CASTS # UR AUTO: ABNORMAL /LPF
IMM GRANULOCYTES NFR BLD AUTO: 1.4 % — SIGNIFICANT CHANGE UP (ref 0–1.5)
KETONES UR-MCNC: ABNORMAL
LEUKOCYTE ESTERASE UR-ACNC: ABNORMAL
LYMPHOCYTES # BLD AUTO: 1.03 K/UL — SIGNIFICANT CHANGE UP (ref 1–3.3)
LYMPHOCYTES # BLD AUTO: 8.3 % — LOW (ref 13–44)
MAGNESIUM SERPL-MCNC: 1.8 MG/DL — SIGNIFICANT CHANGE UP (ref 1.6–2.6)
MCHC RBC-ENTMCNC: 28 PG — SIGNIFICANT CHANGE UP (ref 27–34)
MCHC RBC-ENTMCNC: 31.8 GM/DL — LOW (ref 32–36)
MCV RBC AUTO: 88.2 FL — SIGNIFICANT CHANGE UP (ref 80–100)
MONOCYTES # BLD AUTO: 1.24 K/UL — HIGH (ref 0–0.9)
MONOCYTES NFR BLD AUTO: 10 % — SIGNIFICANT CHANGE UP (ref 2–14)
NEUTROPHILS # BLD AUTO: 9.73 K/UL — HIGH (ref 1.8–7.4)
NEUTROPHILS NFR BLD AUTO: 78.3 % — HIGH (ref 43–77)
NITRITE UR-MCNC: NEGATIVE — SIGNIFICANT CHANGE UP
NRBC # BLD: 0 /100 WBCS — SIGNIFICANT CHANGE UP (ref 0–0)
P-ANCA SER-ACNC: NEGATIVE — SIGNIFICANT CHANGE UP
PH UR: 5 — SIGNIFICANT CHANGE UP (ref 5–8)
PHOSPHATE SERPL-MCNC: 3.4 MG/DL — SIGNIFICANT CHANGE UP (ref 2.5–4.5)
PLATELET # BLD AUTO: 417 K/UL — HIGH (ref 150–400)
POTASSIUM SERPL-MCNC: 3.1 MMOL/L — LOW (ref 3.5–5.3)
POTASSIUM SERPL-SCNC: 3.1 MMOL/L — LOW (ref 3.5–5.3)
PROT SERPL-MCNC: 6.5 GM/DL — SIGNIFICANT CHANGE UP (ref 6–8.3)
PROT UR-MCNC: 100 MG/DL
RBC # BLD: 4.07 M/UL — LOW (ref 4.2–5.8)
RBC # FLD: 15.8 % — HIGH (ref 10.3–14.5)
RBC CASTS # UR COMP ASSIST: SIGNIFICANT CHANGE UP /HPF (ref 0–4)
SODIUM SERPL-SCNC: 144 MMOL/L — SIGNIFICANT CHANGE UP (ref 135–145)
SODIUM UR-SCNC: 31 MMOL/L — SIGNIFICANT CHANGE UP
SP GR SPEC: 1.02 — SIGNIFICANT CHANGE UP (ref 1.01–1.02)
UROBILINOGEN FLD QL: NEGATIVE MG/DL — SIGNIFICANT CHANGE UP
VANCOMYCIN TROUGH SERPL-MCNC: 5.6 UG/ML — LOW (ref 10–20)
WBC # BLD: 12.42 K/UL — HIGH (ref 3.8–10.5)
WBC # FLD AUTO: 12.42 K/UL — HIGH (ref 3.8–10.5)
WBC UR QL: ABNORMAL

## 2020-04-04 PROCEDURE — 99291 CRITICAL CARE FIRST HOUR: CPT

## 2020-04-04 PROCEDURE — 71045 X-RAY EXAM CHEST 1 VIEW: CPT | Mod: 26

## 2020-04-04 RX ORDER — PHENOBARBITAL 60 MG
130 TABLET ORAL ONCE
Refills: 0 | Status: DISCONTINUED | OUTPATIENT
Start: 2020-04-04 | End: 2020-04-04

## 2020-04-04 RX ORDER — PHENOBARBITAL 60 MG
130 TABLET ORAL EVERY 6 HOURS
Refills: 0 | Status: DISCONTINUED | OUTPATIENT
Start: 2020-04-04 | End: 2020-04-06

## 2020-04-04 RX ORDER — VANCOMYCIN HCL 1 G
1250 VIAL (EA) INTRAVENOUS ONCE
Refills: 0 | Status: COMPLETED | OUTPATIENT
Start: 2020-04-04 | End: 2020-04-04

## 2020-04-04 RX ORDER — POTASSIUM CHLORIDE 20 MEQ
40 PACKET (EA) ORAL ONCE
Refills: 0 | Status: COMPLETED | OUTPATIENT
Start: 2020-04-04 | End: 2020-04-04

## 2020-04-04 RX ORDER — DEXMEDETOMIDINE HYDROCHLORIDE IN 0.9% SODIUM CHLORIDE 4 UG/ML
0.5 INJECTION INTRAVENOUS
Qty: 200 | Refills: 0 | Status: DISCONTINUED | OUTPATIENT
Start: 2020-04-04 | End: 2020-04-05

## 2020-04-04 RX ORDER — SODIUM CHLORIDE 9 MG/ML
500 INJECTION, SOLUTION INTRAVENOUS ONCE
Refills: 0 | Status: COMPLETED | OUTPATIENT
Start: 2020-04-04 | End: 2020-04-04

## 2020-04-04 RX ORDER — SODIUM CHLORIDE 9 MG/ML
1000 INJECTION, SOLUTION INTRAVENOUS ONCE
Refills: 0 | Status: COMPLETED | OUTPATIENT
Start: 2020-04-04 | End: 2020-04-04

## 2020-04-04 RX ADMIN — CHLORHEXIDINE GLUCONATE 1 APPLICATION(S): 213 SOLUTION TOPICAL at 08:42

## 2020-04-04 RX ADMIN — Medication 1 GRAM(S): at 20:20

## 2020-04-04 RX ADMIN — DEXMEDETOMIDINE HYDROCHLORIDE IN 0.9% SODIUM CHLORIDE 8.99 MICROGRAM(S)/KG/HR: 4 INJECTION INTRAVENOUS at 23:10

## 2020-04-04 RX ADMIN — DEXMEDETOMIDINE HYDROCHLORIDE IN 0.9% SODIUM CHLORIDE 3.6 MICROGRAM(S)/KG/HR: 4 INJECTION INTRAVENOUS at 01:46

## 2020-04-04 RX ADMIN — SODIUM CHLORIDE 1000 MILLILITER(S): 9 INJECTION, SOLUTION INTRAVENOUS at 04:16

## 2020-04-04 RX ADMIN — Medication 1 GRAM(S): at 05:42

## 2020-04-04 RX ADMIN — DEXMEDETOMIDINE HYDROCHLORIDE IN 0.9% SODIUM CHLORIDE 3.6 MICROGRAM(S)/KG/HR: 4 INJECTION INTRAVENOUS at 07:21

## 2020-04-04 RX ADMIN — Medication 100 MILLIGRAM(S): at 13:28

## 2020-04-04 RX ADMIN — Medication 1 GRAM(S): at 11:53

## 2020-04-04 RX ADMIN — SODIUM CHLORIDE 500 MILLILITER(S): 9 INJECTION, SOLUTION INTRAVENOUS at 11:55

## 2020-04-04 RX ADMIN — ATORVASTATIN CALCIUM 20 MILLIGRAM(S): 80 TABLET, FILM COATED ORAL at 23:43

## 2020-04-04 RX ADMIN — Medication 650 MILLIGRAM(S): at 20:20

## 2020-04-04 RX ADMIN — Medication 1 MILLIGRAM(S): at 11:54

## 2020-04-04 RX ADMIN — Medication 130 MILLIGRAM(S): at 13:28

## 2020-04-04 RX ADMIN — ENOXAPARIN SODIUM 40 MILLIGRAM(S): 100 INJECTION SUBCUTANEOUS at 11:54

## 2020-04-04 RX ADMIN — DEXMEDETOMIDINE HYDROCHLORIDE IN 0.9% SODIUM CHLORIDE 3.6 MICROGRAM(S)/KG/HR: 4 INJECTION INTRAVENOUS at 05:42

## 2020-04-04 RX ADMIN — PIPERACILLIN AND TAZOBACTAM 25 GRAM(S): 4; .5 INJECTION, POWDER, LYOPHILIZED, FOR SOLUTION INTRAVENOUS at 01:45

## 2020-04-04 RX ADMIN — PANTOPRAZOLE SODIUM 40 MILLIGRAM(S): 20 TABLET, DELAYED RELEASE ORAL at 11:54

## 2020-04-04 RX ADMIN — CHLORHEXIDINE GLUCONATE 15 MILLILITER(S): 213 SOLUTION TOPICAL at 08:41

## 2020-04-04 RX ADMIN — Medication 130 MILLIGRAM(S): at 15:58

## 2020-04-04 RX ADMIN — Medication 1 GRAM(S): at 00:48

## 2020-04-04 RX ADMIN — Medication 40 MILLIEQUIVALENT(S): at 08:25

## 2020-04-04 RX ADMIN — Medication 166.67 MILLIGRAM(S): at 21:28

## 2020-04-04 RX ADMIN — Medication 1 TABLET(S): at 11:54

## 2020-04-04 RX ADMIN — PIPERACILLIN AND TAZOBACTAM 25 GRAM(S): 4; .5 INJECTION, POWDER, LYOPHILIZED, FOR SOLUTION INTRAVENOUS at 17:46

## 2020-04-04 RX ADMIN — PIPERACILLIN AND TAZOBACTAM 25 GRAM(S): 4; .5 INJECTION, POWDER, LYOPHILIZED, FOR SOLUTION INTRAVENOUS at 10:00

## 2020-04-04 NOTE — PROGRESS NOTE ADULT - ASSESSMENT
53 y/o M w/hx of alcohol abuse w/DTs admitted for alcohol intoxication now w/withdrawal, intubated overnight for acute hypoxemic respiratory failure likely secondary to aspiration. Hypotension likely secondary to sedation/severe sepsis with septic shock. ROSA likely secondary to ATN/sepsis.     - cont Precedex gtt,   -cont phenobarb prn and ciwa monitoring  - did well on PS trial and extubated to NC, resp status stable  - Wean pressors as tolerated goal MAP >= 65  - Broad spectrum abx  - MVI/thiamine/folate  - Trend Cr, avoid nephrotoxins, f/u renal reccs    CCT 45min

## 2020-04-04 NOTE — PROGRESS NOTE ADULT - SUBJECTIVE AND OBJECTIVE BOX
CC: Patient is a 54y old  Male who presents with a chief complaint of Abdominal Pain (2020 18:36)      ## HPI:HPI:  54 year old male, PMH Etoh abuse, HTN, gastritis presents to the ED with complains of abdominal pain and vomiting that started 3 days ago. Patient reports pain in 8/10, constant, dull and located in epigastric area. Denies chest pain, SOB, palpitations, cough or diarrhea. Patient reports he did not have alcohol to drink in three weeks. BAL on admission is 369. (26 Mar 2020 13:09)      O/N:    ## ROS:  .ros  ## EXAM  ICU Vital Signs Last 24 Hrs  T(C): 37.1 (2020 15:45), Max: 37.1 (2020 15:45)  T(F): 98.8 (2020 15:45), Max: 98.8 (2020 15:45)  HR: 110 (2020 15:45) (54 - 110)  BP: 139/79 (2020 15:45) (91/67 - 149/86)  BP(mean): 107 (2020 15:45) (73 - 107)  ABP: --  ABP(mean): --  RR: 19 (2020 15:45) (15 - 25)  SpO2: 95% (2020 15:45) (94% - 100%)       ## Labs:  Lab Results:  CBC  CBC Full  -  ( 2020 03:13 )  WBC Count : 12.42 K/uL  RBC Count : 4.07 M/uL  Hemoglobin : 11.4 g/dL  Hematocrit : 35.9 %  Platelet Count - Automated : 417 K/uL  Mean Cell Volume : 88.2 fl  Mean Cell Hemoglobin : 28.0 pg  Mean Cell Hemoglobin Concentration : 31.8 gm/dL  Auto Neutrophil # : 9.73 K/uL  Auto Lymphocyte # : 1.03 K/uL  Auto Monocyte # : 1.24 K/uL  Auto Eosinophil # : 0.17 K/uL  Auto Basophil # : 0.07 K/uL  Auto Neutrophil % : 78.3 %  Auto Lymphocyte % : 8.3 %  Auto Monocyte % : 10.0 %  Auto Eosinophil % : 1.4 %  Auto Basophil % : 0.6 %    .		Differential:	[] Automated		[] Manual  Chemistry                        11.4   12.42 )-----------( 417      ( 2020 03:13 )             35.9     04-04    144  |  114<H>  |  30<H>  ----------------------------<  107<H>  3.1<L>   |  23  |  1.93<H>    Ca    7.3<L>      2020 03:13  Phos  3.4     -  Mg     1.8     -04    TPro  6.5  /  Alb  2.1<L>  /  TBili  0.3  /  DBili  x   /  AST  14<L>  /  ALT  14  /  AlkPhos  64  04-04    LIVER FUNCTIONS - ( 2020 03:13 )  Alb: 2.1 g/dL / Pro: 6.5 gm/dL / ALK PHOS: 64 U/L / ALT: 14 U/L / AST: 14 U/L / GGT: x             Urinalysis Basic - ( 2020 03:29 )    Color: Yellow / Appearance: Slightly Turbid / S.020 / pH: x  Gluc: x / Ketone: Trace  / Bili: Negative / Urobili: Negative mg/dL   Blood: x / Protein: 100 mg/dL / Nitrite: Negative   Leuk Esterase: Trace / RBC: 0-2 /HPF / WBC 6-10   Sq Epi: x / Non Sq Epi: Few / Bacteria: Few        ABG - ( 2020 19:07 )  pH, Arterial: x     pH, Blood: 7.29  /  pCO2: 49    /  pO2: 141   / HCO3: 23    / Base Excess: -3.7  /  SaO2: 99        MICROBIOLOGY/CULTURES:  Culture Results:   No growth to date. ( @ 22:51)  Culture Results:   No growth to date. ( @ 22:50)       Medications:  MEDICATIONS  (STANDING):  atorvastatin 20 milliGRAM(s) Oral at bedtime  chlorhexidine 2% Cloths 1 Application(s) Topical <User Schedule>  chlorhexidine 4% Liquid 1 Application(s) Topical <User Schedule>  dexMEDEtomidine Infusion 0.5 MICROgram(s)/kG/Hr (8.99 mL/Hr) IV Continuous <Continuous>  enoxaparin Injectable 40 milliGRAM(s) SubCutaneous daily  folic acid 1 milliGRAM(s) Oral daily  metoprolol tartrate 25 milliGRAM(s) Oral two times a day  multivitamin 1 Tablet(s) Oral daily  pantoprazole   Suspension 40 milliGRAM(s) Oral daily  piperacillin/tazobactam IVPB.. 3.375 Gram(s) IV Intermittent every 8 hours  sucralfate suspension 1 Gram(s) Oral four times a day  thiamine 100 milliGRAM(s) Oral daily    ## O/E:I&O's Summary    2020 07:01  -  2020 07:00  --------------------------------------------------------  IN: 2099.5 mL / OUT: 360 mL / NET: 1739.5 mL    2020 07:01  -  2020 17:04  --------------------------------------------------------  IN: 506 mL / OUT: 725 mL / NET: -219 mL       Mode: AC/ CMV (Assist Control/ Continuous Mandatory Ventilation)  RR (machine): 25  TV (machine): 400  FiO2: 40  PEEP: 5  PS: 5  ITime: 1  MAP: 8  PIP: 11

## 2020-04-05 LAB
ALBUMIN SERPL ELPH-MCNC: 2.2 G/DL — LOW (ref 3.3–5)
ALP SERPL-CCNC: 57 U/L — SIGNIFICANT CHANGE UP (ref 40–120)
ALT FLD-CCNC: 18 U/L — SIGNIFICANT CHANGE UP (ref 12–78)
ANION GAP SERPL CALC-SCNC: 9 MMOL/L — SIGNIFICANT CHANGE UP (ref 5–17)
AST SERPL-CCNC: 34 U/L — SIGNIFICANT CHANGE UP (ref 15–37)
BASOPHILS # BLD AUTO: 0.09 K/UL — SIGNIFICANT CHANGE UP (ref 0–0.2)
BASOPHILS NFR BLD AUTO: 1.4 % — SIGNIFICANT CHANGE UP (ref 0–2)
BILIRUB SERPL-MCNC: 0.4 MG/DL — SIGNIFICANT CHANGE UP (ref 0.2–1.2)
BUN SERPL-MCNC: 30 MG/DL — HIGH (ref 7–23)
CALCIUM SERPL-MCNC: 8.8 MG/DL — SIGNIFICANT CHANGE UP (ref 8.5–10.1)
CHLORIDE SERPL-SCNC: 112 MMOL/L — HIGH (ref 96–108)
CO2 SERPL-SCNC: 23 MMOL/L — SIGNIFICANT CHANGE UP (ref 22–31)
CREAT SERPL-MCNC: 1.53 MG/DL — HIGH (ref 0.5–1.3)
CRP SERPL-MCNC: 26.76 MG/DL — HIGH (ref 0–0.4)
EOSINOPHIL # BLD AUTO: 0.08 K/UL — SIGNIFICANT CHANGE UP (ref 0–0.5)
EOSINOPHIL NFR BLD AUTO: 1.2 % — SIGNIFICANT CHANGE UP (ref 0–6)
GLUCOSE BLDC GLUCOMTR-MCNC: 77 MG/DL — SIGNIFICANT CHANGE UP (ref 70–99)
GLUCOSE SERPL-MCNC: 98 MG/DL — SIGNIFICANT CHANGE UP (ref 70–99)
GRAM STN FLD: SIGNIFICANT CHANGE UP
HCT VFR BLD CALC: 33.9 % — LOW (ref 39–50)
HGB BLD-MCNC: 10.9 G/DL — LOW (ref 13–17)
IMM GRANULOCYTES NFR BLD AUTO: 3.9 % — HIGH (ref 0–1.5)
LYMPHOCYTES # BLD AUTO: 0.88 K/UL — LOW (ref 1–3.3)
LYMPHOCYTES # BLD AUTO: 13.3 % — SIGNIFICANT CHANGE UP (ref 13–44)
MAGNESIUM SERPL-MCNC: 2.2 MG/DL — SIGNIFICANT CHANGE UP (ref 1.6–2.6)
MCHC RBC-ENTMCNC: 27.9 PG — SIGNIFICANT CHANGE UP (ref 27–34)
MCHC RBC-ENTMCNC: 32.2 GM/DL — SIGNIFICANT CHANGE UP (ref 32–36)
MCV RBC AUTO: 86.9 FL — SIGNIFICANT CHANGE UP (ref 80–100)
MONOCYTES # BLD AUTO: 0.5 K/UL — SIGNIFICANT CHANGE UP (ref 0–0.9)
MONOCYTES NFR BLD AUTO: 7.5 % — SIGNIFICANT CHANGE UP (ref 2–14)
NEUTROPHILS # BLD AUTO: 4.83 K/UL — SIGNIFICANT CHANGE UP (ref 1.8–7.4)
NEUTROPHILS NFR BLD AUTO: 72.7 % — SIGNIFICANT CHANGE UP (ref 43–77)
NRBC # BLD: 0 /100 WBCS — SIGNIFICANT CHANGE UP (ref 0–0)
PHOSPHATE SERPL-MCNC: 1.8 MG/DL — LOW (ref 2.5–4.5)
PLATELET # BLD AUTO: 407 K/UL — HIGH (ref 150–400)
POTASSIUM SERPL-MCNC: 3.6 MMOL/L — SIGNIFICANT CHANGE UP (ref 3.5–5.3)
POTASSIUM SERPL-SCNC: 3.6 MMOL/L — SIGNIFICANT CHANGE UP (ref 3.5–5.3)
PROT SERPL-MCNC: 7.3 GM/DL — SIGNIFICANT CHANGE UP (ref 6–8.3)
RBC # BLD: 3.9 M/UL — LOW (ref 4.2–5.8)
RBC # FLD: 15.9 % — HIGH (ref 10.3–14.5)
SODIUM SERPL-SCNC: 144 MMOL/L — SIGNIFICANT CHANGE UP (ref 135–145)
SPECIMEN SOURCE: SIGNIFICANT CHANGE UP
VANCOMYCIN FLD-MCNC: 6.7 UG/ML — SIGNIFICANT CHANGE UP
WBC # BLD: 6.64 K/UL — SIGNIFICANT CHANGE UP (ref 3.8–10.5)
WBC # FLD AUTO: 6.64 K/UL — SIGNIFICANT CHANGE UP (ref 3.8–10.5)

## 2020-04-05 PROCEDURE — 99291 CRITICAL CARE FIRST HOUR: CPT

## 2020-04-05 RX ORDER — DEXMEDETOMIDINE HYDROCHLORIDE IN 0.9% SODIUM CHLORIDE 4 UG/ML
0.2 INJECTION INTRAVENOUS
Qty: 200 | Refills: 0 | Status: DISCONTINUED | OUTPATIENT
Start: 2020-04-05 | End: 2020-04-06

## 2020-04-05 RX ORDER — POTASSIUM PHOSPHATE, MONOBASIC POTASSIUM PHOSPHATE, DIBASIC 236; 224 MG/ML; MG/ML
30 INJECTION, SOLUTION INTRAVENOUS ONCE
Refills: 0 | Status: COMPLETED | OUTPATIENT
Start: 2020-04-05 | End: 2020-04-05

## 2020-04-05 RX ADMIN — Medication 25 MILLIGRAM(S): at 06:34

## 2020-04-05 RX ADMIN — ENOXAPARIN SODIUM 40 MILLIGRAM(S): 100 INJECTION SUBCUTANEOUS at 12:42

## 2020-04-05 RX ADMIN — Medication 650 MILLIGRAM(S): at 12:39

## 2020-04-05 RX ADMIN — PIPERACILLIN AND TAZOBACTAM 25 GRAM(S): 4; .5 INJECTION, POWDER, LYOPHILIZED, FOR SOLUTION INTRAVENOUS at 11:00

## 2020-04-05 RX ADMIN — Medication 100 MILLIGRAM(S): at 13:15

## 2020-04-05 RX ADMIN — PIPERACILLIN AND TAZOBACTAM 25 GRAM(S): 4; .5 INJECTION, POWDER, LYOPHILIZED, FOR SOLUTION INTRAVENOUS at 18:33

## 2020-04-05 RX ADMIN — PANTOPRAZOLE SODIUM 40 MILLIGRAM(S): 20 TABLET, DELAYED RELEASE ORAL at 13:15

## 2020-04-05 RX ADMIN — POTASSIUM PHOSPHATE, MONOBASIC POTASSIUM PHOSPHATE, DIBASIC 83.33 MILLIMOLE(S): 236; 224 INJECTION, SOLUTION INTRAVENOUS at 12:39

## 2020-04-05 RX ADMIN — Medication 1 GRAM(S): at 02:17

## 2020-04-05 RX ADMIN — Medication 130 MILLIGRAM(S): at 02:17

## 2020-04-05 RX ADMIN — DEXMEDETOMIDINE HYDROCHLORIDE IN 0.9% SODIUM CHLORIDE 3.6 MICROGRAM(S)/KG/HR: 4 INJECTION INTRAVENOUS at 17:35

## 2020-04-05 RX ADMIN — DEXMEDETOMIDINE HYDROCHLORIDE IN 0.9% SODIUM CHLORIDE 8.99 MICROGRAM(S)/KG/HR: 4 INJECTION INTRAVENOUS at 02:18

## 2020-04-05 RX ADMIN — CHLORHEXIDINE GLUCONATE 1 APPLICATION(S): 213 SOLUTION TOPICAL at 12:30

## 2020-04-05 RX ADMIN — PIPERACILLIN AND TAZOBACTAM 25 GRAM(S): 4; .5 INJECTION, POWDER, LYOPHILIZED, FOR SOLUTION INTRAVENOUS at 02:18

## 2020-04-05 RX ADMIN — Medication 1 TABLET(S): at 12:40

## 2020-04-05 RX ADMIN — Medication 1 GRAM(S): at 05:26

## 2020-04-05 RX ADMIN — Medication 25 MILLIGRAM(S): at 18:33

## 2020-04-05 RX ADMIN — Medication 1 GRAM(S): at 18:33

## 2020-04-05 RX ADMIN — Medication 1 MILLIGRAM(S): at 12:40

## 2020-04-05 RX ADMIN — Medication 1 GRAM(S): at 12:40

## 2020-04-05 NOTE — PROGRESS NOTE ADULT - SUBJECTIVE AND OBJECTIVE BOX
Interval Events:  off precedex  off levophed  Tmax 103, still w/ congested cough and requiring suctioning    REVIEW OF SYSTEMS:  [ x] Unable to assess ROS because intubated    OBJECTIVE:  ICU Vital Signs Last 24 Hrs  T(C): 37.3 (2020 16:13), Max: 39.4 (2020 08:00)  T(F): 99.1 (2020 16:13), Max: 103 (2020 08:00)  HR: 80 (:) (80 - 105)  BP: 104/73 (2020 16:) (99/63 - 162/90)  BP(mean): 113 (2020 05:24) (76 - 113)  ABP: --  ABP(mean): --  RR: 21 (:13) (20 - 30)  SpO2: 97% (:13) (95% - 99%)         @ 07:  -  05 @ 07:00  --------------------------------------------------------  IN: 797 mL / OUT: 1075 mL / NET: -278 mL     @ 07:  -  05 @ 19:02  --------------------------------------------------------  IN: 1215 mL / OUT: 400 mL / NET: 815 mL      CAPILLARY BLOOD GLUCOSE            LINES:    HOSPITAL MEDICATIONS:  MEDICATIONS  (STANDING):  atorvastatin 20 milliGRAM(s) Oral at bedtime  chlorhexidine 2% Cloths 1 Application(s) Topical <User Schedule>  chlorhexidine 4% Liquid 1 Application(s) Topical <User Schedule>  dexMEDEtomidine Infusion 0.2 MICROgram(s)/kG/Hr (3.6 mL/Hr) IV Continuous <Continuous>  enoxaparin Injectable 40 milliGRAM(s) SubCutaneous daily  folic acid 1 milliGRAM(s) Oral daily  metoprolol tartrate 25 milliGRAM(s) Oral two times a day  multivitamin 1 Tablet(s) Oral daily  pantoprazole   Suspension 40 milliGRAM(s) Oral daily  piperacillin/tazobactam IVPB.. 3.375 Gram(s) IV Intermittent every 8 hours  sucralfate suspension 1 Gram(s) Oral four times a day  thiamine 100 milliGRAM(s) Oral daily    MEDICATIONS  (PRN):  acetaminophen   Tablet .. 650 milliGRAM(s) Oral every 4 hours PRN Temp greater or equal to 38C (100.4F), Mild Pain (1 - 3)  PHENobarbital Injectable 130 milliGRAM(s) IV Push every 6 hours PRN agitation  sodium chloride 0.9% lock flush 10 milliLiter(s) IV Push every 1 hour PRN Pre/post blood products, medications, blood draw, and to maintain line patency      LABS:                        10.9   6.64  )-----------( 407      ( 2020 06:15 )             33.9     Hgb Trend: 10.9<--, 11.4<--, 12.2<--, 13.7<--, 12.1<--  04-05    144  |  112<H>  |  30<H>  ----------------------------<  98  3.6   |  23  |  1.53<H>    Ca    8.8      2020 06:15  Phos  1.8     04-05  Mg     2.2     04-05    TPro  7.3  /  Alb  2.2<L>  /  TBili  0.4  /  DBili  x   /  AST  34  /  ALT  18  /  AlkPhos  57  04-05      Urinalysis Basic - ( 2020 03:29 )    Color: Yellow / Appearance: Slightly Turbid / S.020 / pH: x  Gluc: x / Ketone: Trace  / Bili: Negative / Urobili: Negative mg/dL   Blood: x / Protein: 100 mg/dL / Nitrite: Negative   Leuk Esterase: Trace / RBC: 0-2 /HPF / WBC 6-10   Sq Epi: x / Non Sq Epi: Few / Bacteria: Few            MICROBIOLOGY:     RADIOLOGY:  [ ] Reviewed and interpreted by me

## 2020-04-05 NOTE — PROGRESS NOTE ADULT - SUBJECTIVE AND OBJECTIVE BOX
Patient seen in follow up for ROSA. He remains vented, sedated, off levophed, good urine output. Spikes fevers, Covid19 test negative but still suspected    DTs (delirium tremens)  Substance abuse  Gastritis  EtOH dependence  Mixed hyperlipidemia  PUD (peptic ulcer disease)  Alcohol abuse    MEDICATIONS  (STANDING):  atorvastatin 20 milliGRAM(s) Oral at bedtime  chlorhexidine 2% Cloths 1 Application(s) Topical <User Schedule>  chlorhexidine 4% Liquid 1 Application(s) Topical <User Schedule>  dexMEDEtomidine Infusion 0.2 MICROgram(s)/kG/Hr (3.6 mL/Hr) IV Continuous <Continuous>  enoxaparin Injectable 40 milliGRAM(s) SubCutaneous daily  folic acid 1 milliGRAM(s) Oral daily  metoprolol tartrate 25 milliGRAM(s) Oral two times a day  multivitamin 1 Tablet(s) Oral daily  pantoprazole   Suspension 40 milliGRAM(s) Oral daily  piperacillin/tazobactam IVPB.. 3.375 Gram(s) IV Intermittent every 8 hours  sucralfate suspension 1 Gram(s) Oral four times a day  thiamine 100 milliGRAM(s) Oral daily    MEDICATIONS  (PRN):  acetaminophen   Tablet .. 650 milliGRAM(s) Oral every 4 hours PRN Temp greater or equal to 38C (100.4F), Mild Pain (1 - 3)  PHENobarbital Injectable 130 milliGRAM(s) IV Push every 6 hours PRN agitation  sodium chloride 0.9% lock flush 10 milliLiter(s) IV Push every 1 hour PRN Pre/post blood products, medications, blood draw, and to maintain line patency    T(C): 37.3 (04-05-20 @ 16:13), Max: 39.4 (04-05-20 @ 08:00)  HR: 80 (04-05-20 @ 16:13) (61 - 110)  BP: 104/73 (04-05-20 @ 16:13) (99/63 - 162/90)  RR: 21 (04-05-20 @ 16:13) (15 - 30)  SpO2: 97% (04-05-20 @ 16:13) (94% - 100%)  Wt(kg): --  I&O's Detail    04 Apr 2020 07:01  -  05 Apr 2020 07:00  --------------------------------------------------------  IN:    dexmedetomidine Infusion: 330.5 mL    dexmedetomidine Infusion: 131.4 mL    Enteral Tube Flush: 200 mL    IV PiggyBack: 100 mL    norepinephrine Infusion: 35.1 mL  Total IN: 797 mL    OUT:    Indwelling Catheter - Urethral: 1075 mL  Total OUT: 1075 mL    Total NET: -278 mL      05 Apr 2020 07:01  -  05 Apr 2020 20:24  --------------------------------------------------------  IN:    dexmedetomidine Infusion: 9 mL    dexmedetomidine Infusion: 126 mL    IV PiggyBack: 700 mL    Vital HN: 480 mL  Total IN: 1315 mL    OUT:    Indwelling Catheter - Urethral: 600 mL  Total OUT: 600 mL    Total NET: 715 mL      PHYSICAL EXAM:  General: vented, sedated  Respiratory: b/l air entry  Cardiovascular: S1 S2  Gastrointestinal: soft, NT  Extremities: small edema     CBC Full  -  ( 05 Apr 2020 06:15 )  WBC Count : 6.64 K/uL  RBC Count : 3.90 M/uL  Hemoglobin : 10.9 g/dL  Hematocrit : 33.9 %  Platelet Count - Automated : 407 K/uL  Mean Cell Volume : 86.9 fl  Mean Cell Hemoglobin : 27.9 pg  Mean Cell Hemoglobin Concentration : 32.2 gm/dL  Auto Neutrophil # : 4.83 K/uL  Auto Lymphocyte # : 0.88 K/uL  Auto Monocyte # : 0.50 K/uL  Auto Eosinophil # : 0.08 K/uL  Auto Basophil # : 0.09 K/uL  Auto Neutrophil % : 72.7 %  Auto Lymphocyte % : 13.3 %  Auto Monocyte % : 7.5 %  Auto Eosinophil % : 1.2 %  Auto Basophil % : 1.4 %    04-05    144  |  112<H>  |  30<H>  ----------------------------<  98  3.6   |  23  |  1.53<H>    Ca    8.8      05 Apr 2020 06:15  Phos  1.8     04-05  Mg     2.2     04-05    TPro  7.3  /  Alb  2.2<L>  /  TBili  0.4  /  DBili  x   /  AST  34  /  ALT  18  /  AlkPhos  57  04-05        Sodium, Serum: 144 (04-05 @ 06:15)  Sodium, Serum: 144 (04-04 @ 03:13)  Sodium, Serum: 144 (04-03 @ 03:23)    Creatinine, Serum: 1.53 (04-05 @ 06:15)  Creatinine, Serum: 1.93 (04-04 @ 03:13)  Creatinine, Serum: 2.40 (04-03 @ 03:23)    Potassium, Serum: 3.6 (04-05 @ 06:15)  Potassium, Serum: 3.1 (04-04 @ 03:13)  Potassium, Serum: 4.0 (04-03 @ 03:23)    Hemoglobin: 10.9 (04-05 @ 06:15)  Hemoglobin: 11.4 (04-04 @ 03:13)  Hemoglobin: 12.2 (04-03 @ 03:23)

## 2020-04-05 NOTE — PROGRESS NOTE ADULT - ASSESSMENT
54M w/ ETtOH abuse w/ DTs. ADmitted w/ alcohol intoxication and subsequent withdrawal. Intubated for acute hypoxemic respiratory failure likely secondary to aspiration, now extubated. Also had septic shock which has now resolved and ROSA.     - would restart precedex gtt  - continue w/ phenobarb prn and CIWA monitoring  - Tmax 103 today- WBC count significantly improved, COVID19- x 2; check procal in AM, if continues to be febrile consider rechecking for COVID19  - zosyn x 7 days   - MVI/Thiamine/Folate  - ROSA secondary to ATN, improving; phos repleted; monitor UOP and electrolytes

## 2020-04-05 NOTE — PROGRESS NOTE ADULT - ASSESSMENT
55 y/o M w/hx of alcohol abuse w/DTs admitted for alcohol intoxication/withdrawal, intubated for acute hypoxemic respiratory failure likely secondary to aspiration. Hypotension likely secondary to sedation/severe sepsis with septic shock. ROSA likely secondary to ATN/sepsis resolving, Phosphorus supplemented.  We will stop following, please call with questions.

## 2020-04-06 LAB
ALBUMIN SERPL ELPH-MCNC: 2.2 G/DL — LOW (ref 3.3–5)
ALP SERPL-CCNC: 53 U/L — SIGNIFICANT CHANGE UP (ref 40–120)
ALT FLD-CCNC: 23 U/L — SIGNIFICANT CHANGE UP (ref 12–78)
ANION GAP SERPL CALC-SCNC: 10 MMOL/L — SIGNIFICANT CHANGE UP (ref 5–17)
AST SERPL-CCNC: 42 U/L — HIGH (ref 15–37)
BASOPHILS # BLD AUTO: 0.07 K/UL — SIGNIFICANT CHANGE UP (ref 0–0.2)
BASOPHILS NFR BLD AUTO: 1.2 % — SIGNIFICANT CHANGE UP (ref 0–2)
BILIRUB SERPL-MCNC: 0.4 MG/DL — SIGNIFICANT CHANGE UP (ref 0.2–1.2)
BUN SERPL-MCNC: 21 MG/DL — SIGNIFICANT CHANGE UP (ref 7–23)
CALCIUM SERPL-MCNC: 8.3 MG/DL — LOW (ref 8.5–10.1)
CHLORIDE SERPL-SCNC: 111 MMOL/L — HIGH (ref 96–108)
CO2 SERPL-SCNC: 21 MMOL/L — LOW (ref 22–31)
CREAT SERPL-MCNC: 1.24 MG/DL — SIGNIFICANT CHANGE UP (ref 0.5–1.3)
CULTURE RESULTS: SIGNIFICANT CHANGE UP
CULTURE RESULTS: SIGNIFICANT CHANGE UP
EOSINOPHIL # BLD AUTO: 0.06 K/UL — SIGNIFICANT CHANGE UP (ref 0–0.5)
EOSINOPHIL NFR BLD AUTO: 1.1 % — SIGNIFICANT CHANGE UP (ref 0–6)
GLUCOSE BLDC GLUCOMTR-MCNC: 126 MG/DL — HIGH (ref 70–99)
GLUCOSE SERPL-MCNC: 116 MG/DL — HIGH (ref 70–99)
HCT VFR BLD CALC: 34.5 % — LOW (ref 39–50)
HGB BLD-MCNC: 10.8 G/DL — LOW (ref 13–17)
IMM GRANULOCYTES NFR BLD AUTO: 6.3 % — HIGH (ref 0–1.5)
LYMPHOCYTES # BLD AUTO: 1.12 K/UL — SIGNIFICANT CHANGE UP (ref 1–3.3)
LYMPHOCYTES # BLD AUTO: 19.7 % — SIGNIFICANT CHANGE UP (ref 13–44)
MAGNESIUM SERPL-MCNC: 1.9 MG/DL — SIGNIFICANT CHANGE UP (ref 1.6–2.6)
MCHC RBC-ENTMCNC: 27.1 PG — SIGNIFICANT CHANGE UP (ref 27–34)
MCHC RBC-ENTMCNC: 31.3 GM/DL — LOW (ref 32–36)
MCV RBC AUTO: 86.7 FL — SIGNIFICANT CHANGE UP (ref 80–100)
MONOCYTES # BLD AUTO: 0.61 K/UL — SIGNIFICANT CHANGE UP (ref 0–0.9)
MONOCYTES NFR BLD AUTO: 10.7 % — SIGNIFICANT CHANGE UP (ref 2–14)
NEUTROPHILS # BLD AUTO: 3.47 K/UL — SIGNIFICANT CHANGE UP (ref 1.8–7.4)
NEUTROPHILS NFR BLD AUTO: 61 % — SIGNIFICANT CHANGE UP (ref 43–77)
NRBC # BLD: 0 /100 WBCS — SIGNIFICANT CHANGE UP (ref 0–0)
PHOSPHATE SERPL-MCNC: 1.3 MG/DL — LOW (ref 2.5–4.5)
PLATELET # BLD AUTO: 429 K/UL — HIGH (ref 150–400)
POTASSIUM SERPL-MCNC: 3 MMOL/L — LOW (ref 3.5–5.3)
POTASSIUM SERPL-SCNC: 3 MMOL/L — LOW (ref 3.5–5.3)
PROCALCITONIN SERPL-MCNC: 6.76 NG/ML — HIGH (ref 0.02–0.1)
PROT SERPL-MCNC: 7.5 GM/DL — SIGNIFICANT CHANGE UP (ref 6–8.3)
RBC # BLD: 3.98 M/UL — LOW (ref 4.2–5.8)
RBC # FLD: 15.8 % — HIGH (ref 10.3–14.5)
SODIUM SERPL-SCNC: 142 MMOL/L — SIGNIFICANT CHANGE UP (ref 135–145)
SPECIMEN SOURCE: SIGNIFICANT CHANGE UP
SPECIMEN SOURCE: SIGNIFICANT CHANGE UP
WBC # BLD: 5.69 K/UL — SIGNIFICANT CHANGE UP (ref 3.8–10.5)
WBC # FLD AUTO: 5.69 K/UL — SIGNIFICANT CHANGE UP (ref 3.8–10.5)

## 2020-04-06 PROCEDURE — 99233 SBSQ HOSP IP/OBS HIGH 50: CPT

## 2020-04-06 PROCEDURE — 99232 SBSQ HOSP IP/OBS MODERATE 35: CPT

## 2020-04-06 PROCEDURE — 71045 X-RAY EXAM CHEST 1 VIEW: CPT | Mod: 26

## 2020-04-06 RX ORDER — ACETAMINOPHEN 500 MG
650 TABLET ORAL EVERY 6 HOURS
Refills: 0 | Status: DISCONTINUED | OUTPATIENT
Start: 2020-04-06 | End: 2020-04-14

## 2020-04-06 RX ORDER — POTASSIUM CHLORIDE 20 MEQ
20 PACKET (EA) ORAL ONCE
Refills: 0 | Status: COMPLETED | OUTPATIENT
Start: 2020-04-06 | End: 2020-04-06

## 2020-04-06 RX ORDER — MAGNESIUM SULFATE 500 MG/ML
1 VIAL (ML) INJECTION ONCE
Refills: 0 | Status: COMPLETED | OUTPATIENT
Start: 2020-04-06 | End: 2020-04-06

## 2020-04-06 RX ORDER — VANCOMYCIN HCL 1 G
750 VIAL (EA) INTRAVENOUS EVERY 12 HOURS
Refills: 0 | Status: DISCONTINUED | OUTPATIENT
Start: 2020-04-06 | End: 2020-04-06

## 2020-04-06 RX ORDER — MEROPENEM 1 G/30ML
1000 INJECTION INTRAVENOUS EVERY 8 HOURS
Refills: 0 | Status: DISCONTINUED | OUTPATIENT
Start: 2020-04-06 | End: 2020-04-11

## 2020-04-06 RX ORDER — PHENOBARBITAL 60 MG
130 TABLET ORAL EVERY 6 HOURS
Refills: 0 | Status: DISCONTINUED | OUTPATIENT
Start: 2020-04-06 | End: 2020-04-13

## 2020-04-06 RX ORDER — POTASSIUM PHOSPHATE, MONOBASIC POTASSIUM PHOSPHATE, DIBASIC 236; 224 MG/ML; MG/ML
30 INJECTION, SOLUTION INTRAVENOUS ONCE
Refills: 0 | Status: COMPLETED | OUTPATIENT
Start: 2020-04-06 | End: 2020-04-06

## 2020-04-06 RX ADMIN — Medication 130 MILLIGRAM(S): at 23:52

## 2020-04-06 RX ADMIN — PIPERACILLIN AND TAZOBACTAM 25 GRAM(S): 4; .5 INJECTION, POWDER, LYOPHILIZED, FOR SOLUTION INTRAVENOUS at 12:08

## 2020-04-06 RX ADMIN — Medication 1 GRAM(S): at 23:52

## 2020-04-06 RX ADMIN — Medication 100 MILLIGRAM(S): at 12:08

## 2020-04-06 RX ADMIN — POTASSIUM PHOSPHATE, MONOBASIC POTASSIUM PHOSPHATE, DIBASIC 83.33 MILLIMOLE(S): 236; 224 INJECTION, SOLUTION INTRAVENOUS at 08:19

## 2020-04-06 RX ADMIN — CHLORHEXIDINE GLUCONATE 1 APPLICATION(S): 213 SOLUTION TOPICAL at 09:58

## 2020-04-06 RX ADMIN — MEROPENEM 100 MILLIGRAM(S): 1 INJECTION INTRAVENOUS at 23:51

## 2020-04-06 RX ADMIN — ATORVASTATIN CALCIUM 20 MILLIGRAM(S): 80 TABLET, FILM COATED ORAL at 23:52

## 2020-04-06 RX ADMIN — ENOXAPARIN SODIUM 40 MILLIGRAM(S): 100 INJECTION SUBCUTANEOUS at 12:09

## 2020-04-06 RX ADMIN — Medication 1 TABLET(S): at 12:08

## 2020-04-06 RX ADMIN — PANTOPRAZOLE SODIUM 40 MILLIGRAM(S): 20 TABLET, DELAYED RELEASE ORAL at 12:09

## 2020-04-06 RX ADMIN — PIPERACILLIN AND TAZOBACTAM 25 GRAM(S): 4; .5 INJECTION, POWDER, LYOPHILIZED, FOR SOLUTION INTRAVENOUS at 17:59

## 2020-04-06 RX ADMIN — Medication 1 GRAM(S): at 12:08

## 2020-04-06 RX ADMIN — PIPERACILLIN AND TAZOBACTAM 25 GRAM(S): 4; .5 INJECTION, POWDER, LYOPHILIZED, FOR SOLUTION INTRAVENOUS at 03:27

## 2020-04-06 RX ADMIN — DEXMEDETOMIDINE HYDROCHLORIDE IN 0.9% SODIUM CHLORIDE 3.6 MICROGRAM(S)/KG/HR: 4 INJECTION INTRAVENOUS at 13:08

## 2020-04-06 RX ADMIN — Medication 25 MILLIGRAM(S): at 06:37

## 2020-04-06 RX ADMIN — Medication 650 MILLIGRAM(S): at 21:27

## 2020-04-06 RX ADMIN — CHLORHEXIDINE GLUCONATE 1 APPLICATION(S): 213 SOLUTION TOPICAL at 06:37

## 2020-04-06 RX ADMIN — Medication 20 MILLIEQUIVALENT(S): at 08:19

## 2020-04-06 RX ADMIN — Medication 1 GRAM(S): at 18:00

## 2020-04-06 RX ADMIN — Medication 1 MILLIGRAM(S): at 12:08

## 2020-04-06 RX ADMIN — Medication 166.67 MILLIGRAM(S): at 06:38

## 2020-04-06 RX ADMIN — Medication 100 GRAM(S): at 13:05

## 2020-04-06 RX ADMIN — ATORVASTATIN CALCIUM 20 MILLIGRAM(S): 80 TABLET, FILM COATED ORAL at 03:09

## 2020-04-06 RX ADMIN — Medication 130 MILLIGRAM(S): at 16:59

## 2020-04-06 RX ADMIN — Medication 1 GRAM(S): at 06:38

## 2020-04-06 RX ADMIN — Medication 25 MILLIGRAM(S): at 18:00

## 2020-04-06 RX ADMIN — Medication 2 MILLIGRAM(S): at 17:46

## 2020-04-06 RX ADMIN — DEXMEDETOMIDINE HYDROCHLORIDE IN 0.9% SODIUM CHLORIDE 3.6 MICROGRAM(S)/KG/HR: 4 INJECTION INTRAVENOUS at 06:38

## 2020-04-06 RX ADMIN — Medication 1 GRAM(S): at 00:43

## 2020-04-06 NOTE — CHART NOTE - NSCHARTNOTEFT_GEN_A_CORE
54 year old male admitted with ETOH withdrawal,   Intubated 4/2 for respiratory distress  extubated 4/4  Patient Covid test negative  will retest 2.d to fever   will titrate precedex to off, continue precedex     Report given to Dr Ruiz

## 2020-04-06 NOTE — PROGRESS NOTE ADULT - SUBJECTIVE AND OBJECTIVE BOX
Interval Events:    REVIEW OF SYSTEMS:  [ x] Unable to assess ROS because intubated    OBJECTIVE:  ICU Vital Signs Last 24 Hrs  T(C): 37.3 (06 Apr 2020 12:16), Max: 39.1 (06 Apr 2020 06:53)  T(F): 99.2 (06 Apr 2020 12:16), Max: 102.3 (06 Apr 2020 06:53)  HR: 75 (06 Apr 2020 12:16) (75 - 108)  BP: 136/90 (06 Apr 2020 12:16) (104/73 - 186/111)  BP(mean): 105 (06 Apr 2020 12:16) (105 - 137)  ABP: --  ABP(mean): --  RR: 22 (06 Apr 2020 12:16) (18 - 32)  SpO2: 99% (06 Apr 2020 12:16) (94% - 100%)        04-05 @ 07:01  -  04-06 @ 07:00  --------------------------------------------------------  IN: 1795 mL / OUT: 1700 mL / NET: 95 mL    04-06 @ 07:01  -  04-06 @ 13:16  --------------------------------------------------------  IN: 51.3 mL / OUT: 275 mL / NET: -223.7 mL      CAPILLARY BLOOD GLUCOSE      POCT Blood Glucose.: 126 mg/dL (06 Apr 2020 04:37)        LINES:    HOSPITAL MEDICATIONS:  MEDICATIONS  (STANDING):  atorvastatin 20 milliGRAM(s) Oral at bedtime  chlorhexidine 2% Cloths 1 Application(s) Topical <User Schedule>  chlorhexidine 4% Liquid 1 Application(s) Topical <User Schedule>  dexMEDEtomidine Infusion 0.2 MICROgram(s)/kG/Hr (3.6 mL/Hr) IV Continuous <Continuous>  enoxaparin Injectable 40 milliGRAM(s) SubCutaneous daily  folic acid 1 milliGRAM(s) Oral daily  metoprolol tartrate 25 milliGRAM(s) Oral two times a day  multivitamin 1 Tablet(s) Oral daily  pantoprazole   Suspension 40 milliGRAM(s) Oral daily  PHENobarbital Injectable 130 milliGRAM(s) IV Push every 6 hours  piperacillin/tazobactam IVPB.. 3.375 Gram(s) IV Intermittent every 8 hours  sucralfate suspension 1 Gram(s) Oral four times a day  thiamine 100 milliGRAM(s) Oral daily    MEDICATIONS  (PRN):  acetaminophen   Tablet .. 650 milliGRAM(s) Oral every 4 hours PRN Temp greater or equal to 38C (100.4F), Mild Pain (1 - 3)  sodium chloride 0.9% lock flush 10 milliLiter(s) IV Push every 1 hour PRN Pre/post blood products, medications, blood draw, and to maintain line patency      LABS:                        10.8   5.69  )-----------( 429      ( 06 Apr 2020 04:54 )             34.5     Hgb Trend: 10.8<--, 10.9<--, 11.4<--, 12.2<--, 13.7<--  04-06    142  |  111<H>  |  21  ----------------------------<  116<H>  3.0<L>   |  21<L>  |  1.24    Ca    8.3<L>      06 Apr 2020 04:54  Phos  1.3     04-06  Mg     1.9     04-06    TPro  7.5  /  Alb  2.2<L>  /  TBili  0.4  /  DBili  x   /  AST  42<H>  /  ALT  23  /  AlkPhos  53  04-06              MICROBIOLOGY:     RADIOLOGY:  [ ] Reviewed and interpreted by me

## 2020-04-06 NOTE — PROGRESS NOTE ADULT - ASSESSMENT
54M w/ ETtOH abuse w/ DTs. ADmitted w/ alcohol intoxication and subsequent withdrawal. Intubated for acute hypoxemic respiratory failure likely secondary to aspiration, now extubated. Also had septic shock which has now resolved and ROSA.     - titrate off precedex gtt  - start phenobarb ATC for now, CIWA monitoring  - Tmax 102.3 today- WBC count normalized, COVID19- x 2; procal elevated  - would check COVID19 today again in setting of contined fevers  - zosyn x 7 days ; no need for vancomycin as WBC count has normalized and fever may be due to COVID19  - MVI/Thiamine/Folate  - ROSA secondary to ATN, improving; phos repleted; monitor UOP and electrolytes  - stable for transfer to medical floors today

## 2020-04-06 NOTE — PROGRESS NOTE ADULT - SUBJECTIVE AND OBJECTIVE BOX
Patient is a 54y old  Male who presents with a chief complaint of Abdominal Pain (06 Apr 2020 13:16)      INTERVAL HPI / OVERNIGHT EVENTS:   Intubated 4/2 for respiratory distress  extubated 4/4      MEDICATIONS  (STANDING):  atorvastatin 20 milliGRAM(s) Oral at bedtime  chlorhexidine 2% Cloths 1 Application(s) Topical <User Schedule>  chlorhexidine 4% Liquid 1 Application(s) Topical <User Schedule>  enoxaparin Injectable 40 milliGRAM(s) SubCutaneous daily  folic acid 1 milliGRAM(s) Oral daily  meropenem  IVPB 1000 milliGRAM(s) IV Intermittent every 8 hours  metoprolol tartrate 25 milliGRAM(s) Oral two times a day  multivitamin 1 Tablet(s) Oral daily  pantoprazole   Suspension 40 milliGRAM(s) Oral daily  PHENobarbital Injectable 130 milliGRAM(s) IV Push every 6 hours  piperacillin/tazobactam IVPB.. 3.375 Gram(s) IV Intermittent every 8 hours  sucralfate suspension 1 Gram(s) Oral four times a day  thiamine 100 milliGRAM(s) Oral daily    MEDICATIONS  (PRN):  acetaminophen   Tablet .. 650 milliGRAM(s) Oral every 4 hours PRN Temp greater or equal to 38C (100.4F), Mild Pain (1 - 3)  sodium chloride 0.9% lock flush 10 milliLiter(s) IV Push every 1 hour PRN Pre/post blood products, medications, blood draw, and to maintain line patency      Vital Signs Last 24 Hrs  T(C): 37.1 (06 Apr 2020 14:52), Max: 39.1 (06 Apr 2020 06:53)  T(F): 98.7 (06 Apr 2020 14:52), Max: 102.3 (06 Apr 2020 06:53)  HR: 84 (06 Apr 2020 14:52) (75 - 108)  BP: 154/96 (06 Apr 2020 14:52) (136/90 - 186/111)  BP(mean): 105 (06 Apr 2020 12:16) (105 - 137)  RR: 20 (06 Apr 2020 14:52) (18 - 32)  SpO2: 95% (06 Apr 2020 14:52) (94% - 100%)    Review of systems:  General : + fever /chills, fatigue  CVS : no chest pain, palpitations  Lungs : no shortness of breath, cough  GI : no abdominal pain ,vomiting, diarrhea   : no dysuria, hematuria        PHYSICAL EXAM:  General :NAD  Constitutional:  well-groomed, well-developed  Respiratory: CTAB/L  Cardiovascular: S1 and S2, RRR, no M/G/R  Gastrointestinal: BS+, soft, NT/ND  Extremities: No peripheral edema  Vascular: 2+ peripheral pulses  Skin: No rashes      LABS:                        10.8   5.69  )-----------( 429      ( 06 Apr 2020 04:54 )             34.5     04-06    142  |  111<H>  |  21  ----------------------------<  116<H>  3.0<L>   |  21<L>  |  1.24    Ca    8.3<L>      06 Apr 2020 04:54  Phos  1.3     04-06  Mg     1.9     04-06    TPro  7.5  /  Alb  2.2<L>  /  TBili  0.4  /  DBili  x   /  AST  42<H>  /  ALT  23  /  AlkPhos  53  04-06          MICROBIOLOGY:  RECENT CULTURES:  04-05 .Sputum Sputum XXXX   Few polymorphonuclear leukocytes per low power field  Few Squamous epithelial cells per low power field  Rare Gram Positive Cocci in Clusters per oil power field XXXX    04-02 .Blood Blood XXXX XXXX   No growth to date.    04-02 .Blood Blood XXXX XXXX   No growth to date.          RADIOLOGY & ADDITIONAL STUDIES:

## 2020-04-06 NOTE — PROGRESS NOTE ADULT - ASSESSMENT
54 yr old alcoholic male seen with   1.Acute respiratory failure : pt extubated now   reorder CT chest to r/o aspiration pneumonia vs COVID  pneumonia (more sp GGO ) as pt still has fever  now on zosyn ,still febrile so change to meropenem to cover ESBL     2.Fever/tachypnea/sepsis: COVID sent third time(COVID may be false negative)  do CT chest     2.Aspiraiton pneumonia : in the setting of neg covid twice and pt is alcoholic so high chances of developing aspiration   sputum c/s MRSA PCR   antibiotics as above    3.ROSA :sec to above/drugs  vanco sh be renally dosed

## 2020-04-07 LAB
ANION GAP SERPL CALC-SCNC: 10 MMOL/L — SIGNIFICANT CHANGE UP (ref 5–17)
BUN SERPL-MCNC: 20 MG/DL — SIGNIFICANT CHANGE UP (ref 7–23)
CALCIUM SERPL-MCNC: 8.5 MG/DL — SIGNIFICANT CHANGE UP (ref 8.5–10.1)
CHLORIDE SERPL-SCNC: 113 MMOL/L — HIGH (ref 96–108)
CO2 SERPL-SCNC: 22 MMOL/L — SIGNIFICANT CHANGE UP (ref 22–31)
CREAT SERPL-MCNC: 1.25 MG/DL — SIGNIFICANT CHANGE UP (ref 0.5–1.3)
CULTURE RESULTS: SIGNIFICANT CHANGE UP
GBM IGG SER-ACNC: <1 AI — SIGNIFICANT CHANGE UP
GLUCOSE SERPL-MCNC: 94 MG/DL — SIGNIFICANT CHANGE UP (ref 70–99)
GRAM STN FLD: SIGNIFICANT CHANGE UP
PHOSPHATE SERPL-MCNC: 2.6 MG/DL — SIGNIFICANT CHANGE UP (ref 2.5–4.5)
POTASSIUM SERPL-MCNC: 3.2 MMOL/L — LOW (ref 3.5–5.3)
POTASSIUM SERPL-SCNC: 3.2 MMOL/L — LOW (ref 3.5–5.3)
SARS-COV-2 RNA SPEC QL NAA+PROBE: SIGNIFICANT CHANGE UP
SODIUM SERPL-SCNC: 145 MMOL/L — SIGNIFICANT CHANGE UP (ref 135–145)
SPECIMEN SOURCE: SIGNIFICANT CHANGE UP

## 2020-04-07 PROCEDURE — 99233 SBSQ HOSP IP/OBS HIGH 50: CPT

## 2020-04-07 PROCEDURE — 99232 SBSQ HOSP IP/OBS MODERATE 35: CPT

## 2020-04-07 RX ADMIN — PIPERACILLIN AND TAZOBACTAM 25 GRAM(S): 4; .5 INJECTION, POWDER, LYOPHILIZED, FOR SOLUTION INTRAVENOUS at 13:17

## 2020-04-07 RX ADMIN — MEROPENEM 100 MILLIGRAM(S): 1 INJECTION INTRAVENOUS at 07:04

## 2020-04-07 RX ADMIN — Medication 1 GRAM(S): at 07:04

## 2020-04-07 RX ADMIN — Medication 2 MILLIGRAM(S): at 10:28

## 2020-04-07 RX ADMIN — Medication 25 MILLIGRAM(S): at 07:04

## 2020-04-07 RX ADMIN — Medication 130 MILLIGRAM(S): at 13:18

## 2020-04-07 RX ADMIN — PIPERACILLIN AND TAZOBACTAM 25 GRAM(S): 4; .5 INJECTION, POWDER, LYOPHILIZED, FOR SOLUTION INTRAVENOUS at 02:14

## 2020-04-07 RX ADMIN — Medication 1 GRAM(S): at 17:04

## 2020-04-07 RX ADMIN — CHLORHEXIDINE GLUCONATE 1 APPLICATION(S): 213 SOLUTION TOPICAL at 07:03

## 2020-04-07 RX ADMIN — ENOXAPARIN SODIUM 40 MILLIGRAM(S): 100 INJECTION SUBCUTANEOUS at 13:18

## 2020-04-07 RX ADMIN — Medication 130 MILLIGRAM(S): at 18:46

## 2020-04-07 RX ADMIN — MEROPENEM 100 MILLIGRAM(S): 1 INJECTION INTRAVENOUS at 17:04

## 2020-04-07 RX ADMIN — Medication 130 MILLIGRAM(S): at 07:04

## 2020-04-07 RX ADMIN — Medication 2 MILLIGRAM(S): at 17:16

## 2020-04-07 NOTE — PROGRESS NOTE ADULT - SUBJECTIVE AND OBJECTIVE BOX
Patient is a 54y old  Male who presents with a chief complaint of Abdominal Pain (07 Apr 2020 14:17)      INTERVAL HPI / OVERNIGHT EVENTS: very confused ,not waking up     MEDICATIONS  (STANDING):  atorvastatin 20 milliGRAM(s) Oral at bedtime  chlorhexidine 2% Cloths 1 Application(s) Topical <User Schedule>  chlorhexidine 4% Liquid 1 Application(s) Topical <User Schedule>  enoxaparin Injectable 40 milliGRAM(s) SubCutaneous daily  folic acid 1 milliGRAM(s) Oral daily  meropenem  IVPB 1000 milliGRAM(s) IV Intermittent every 8 hours  metoprolol tartrate 25 milliGRAM(s) Oral two times a day  multivitamin 1 Tablet(s) Oral daily  pantoprazole   Suspension 40 milliGRAM(s) Oral daily  PHENobarbital Injectable 130 milliGRAM(s) IV Push every 6 hours  piperacillin/tazobactam IVPB.. 3.375 Gram(s) IV Intermittent every 8 hours  sucralfate suspension 1 Gram(s) Oral four times a day  thiamine 100 milliGRAM(s) Oral daily    MEDICATIONS  (PRN):  acetaminophen   Tablet .. 650 milliGRAM(s) Oral every 4 hours PRN Temp greater or equal to 38C (100.4F), Mild Pain (1 - 3)  acetaminophen  Suppository .. 650 milliGRAM(s) Rectal every 6 hours PRN Temp greater or equal to 38C (100.4F)  LORazepam   Injectable 2 milliGRAM(s) IV Push every 4 hours PRN Agitation  sodium chloride 0.9% lock flush 10 milliLiter(s) IV Push every 1 hour PRN Pre/post blood products, medications, blood draw, and to maintain line patency      Vital Signs Last 24 Hrs  T(C): 38.1 (07 Apr 2020 12:00), Max: 39.7 (06 Apr 2020 19:51)  T(F): 100.6 (07 Apr 2020 12:00), Max: 103.5 (06 Apr 2020 19:51)  HR: 96 (07 Apr 2020 12:00) (96 - 108)  BP: 143/87 (07 Apr 2020 12:00) (134/90 - 143/87)  BP(mean): --  RR: 18 (07 Apr 2020 12:00) (18 - 20)  SpO2: 96% (07 Apr 2020 12:00) (92% - 96%)    Review of systems:  UTO         PHYSICAL EXAM:  General :NAD, sleepy   Constitutional:  well-groomed, well-developed  Respiratory: CTAB/L  Cardiovascular: S1 and S2, RRR, no M/G/R  Gastrointestinal: BS+, soft, NT/ND  Extremities: No peripheral edema  Vascular: 2+ peripheral pulses  Skin: No rashes      LABS:                        10.8   5.69  )-----------( 429      ( 06 Apr 2020 04:54 )             34.5     04-07    145  |  113<H>  |  20  ----------------------------<  94  3.2<L>   |  22  |  1.25    Ca    8.5      07 Apr 2020 13:02  Phos  2.6     04-07  Mg     1.9     04-06    TPro  7.5  /  Alb  2.2<L>  /  TBili  0.4  /  DBili  x   /  AST  42<H>  /  ALT  23  /  AlkPhos  53  04-06          MICROBIOLOGY:  RECENT CULTURES:  04-05 .Sputum Sputum XXXX   Few polymorphonuclear leukocytes per low power field  Few Squamous epithelial cells per low power field  Rare Gram Positive Cocci in Clusters per oil power field   Normal Respiratory Melanie present    04-02 .Blood Blood XXXX XXXX   No Growth Final    04-02 .Blood Blood XXXX XXXX   No Growth Final          RADIOLOGY & ADDITIONAL STUDIES:

## 2020-04-07 NOTE — SWALLOW BEDSIDE ASSESSMENT ADULT - MODE OF PRESENTATION
spoon/fed by clinician cup/pt with difficultly holding the cup/spoon/fed by clinician cup/spoon/pt with difficultly holding the cup/fed by clinician fed by clinician/self fed/pt with difficultly hand to mouth and fine motor

## 2020-04-07 NOTE — SWALLOW BEDSIDE ASSESSMENT ADULT - SLP GENERAL OBSERVATIONS
pt seen bedside, alert and oriented to self. pt disoriented to situation, was able to respond to simple autobiographical questions with good accuracy and basic want questions for eval. pt did not follow one step directions and noted fleeting eye contact with SLP. speech mildly dysarthric 2/2 reduced articulatory precision

## 2020-04-07 NOTE — SWALLOW BEDSIDE ASSESSMENT ADULT - SWALLOW EVAL: DIAGNOSIS
pt presented with confusion/restlessness, overall weakness and oropharyngeal phases of swallow marked by decreased labial seal, reduced mastication bolus formation/manipulation with soft solid causing mild oral residue, slow oral transport posterior with suspected delay in swallow trigger and adequate laryngeal elevation. noted dry cough throughout po trials however suspect not related to swallow.

## 2020-04-07 NOTE — PROGRESS NOTE ADULT - SUBJECTIVE AND OBJECTIVE BOX
HPI:  54 year old male, PMH Etoh abuse, HTN, gastritis presents to the ED with complains of abdominal pain and vomiting that started 3 days ago. Patient reports pain in 8/10, constant, dull and located in epigastric area. Denies chest pain, SOB, palpitations, cough or diarrhea. Patient reports he did not have alcohol to drink in three weeks. BAL on admission is 369. (26 Mar 2020 13:09)    Patient is a 54y old  Male who presents with a chief complaint of Abdominal Pain (06 Apr 2020 16:45)      INTERVAL HPI/OVERNIGHT EVENTS:    MEDICATIONS  (STANDING):  atorvastatin 20 milliGRAM(s) Oral at bedtime  chlorhexidine 2% Cloths 1 Application(s) Topical <User Schedule>  chlorhexidine 4% Liquid 1 Application(s) Topical <User Schedule>  enoxaparin Injectable 40 milliGRAM(s) SubCutaneous daily  folic acid 1 milliGRAM(s) Oral daily  meropenem  IVPB 1000 milliGRAM(s) IV Intermittent every 8 hours  metoprolol tartrate 25 milliGRAM(s) Oral two times a day  multivitamin 1 Tablet(s) Oral daily  pantoprazole   Suspension 40 milliGRAM(s) Oral daily  PHENobarbital Injectable 130 milliGRAM(s) IV Push every 6 hours  piperacillin/tazobactam IVPB.. 3.375 Gram(s) IV Intermittent every 8 hours  sucralfate suspension 1 Gram(s) Oral four times a day  thiamine 100 milliGRAM(s) Oral daily    MEDICATIONS  (PRN):  acetaminophen   Tablet .. 650 milliGRAM(s) Oral every 4 hours PRN Temp greater or equal to 38C (100.4F), Mild Pain (1 - 3)  acetaminophen  Suppository .. 650 milliGRAM(s) Rectal every 6 hours PRN Temp greater or equal to 38C (100.4F)  LORazepam   Injectable 2 milliGRAM(s) IV Push every 4 hours PRN Agitation  sodium chloride 0.9% lock flush 10 milliLiter(s) IV Push every 1 hour PRN Pre/post blood products, medications, blood draw, and to maintain line patency      Allergies    No Known Allergies    Intolerances        REVIEW OF SYSTEMS:  CONSTITUTIONAL: No fever, weight loss, or fatigue  EYES: No eye pain, visual disturbances, or discharge  ENMT:  No difficulty hearing, tinnitus, vertigo; No sinus or throat pain  NECK: No pain or stiffness  BREASTS: No pain, masses, or nipple discharge  RESPIRATORY: No cough, wheezing, chills or hemoptysis; No shortness of breath  CARDIOVASCULAR: No chest pain, palpitations, dizziness, or leg swelling  GASTROINTESTINAL: No abdominal or epigastric pain. No nausea, vomiting, or hematemesis; No diarrhea or constipation. No melena or hematochezia.  GENITOURINARY: No dysuria, frequency, hematuria, or incontinence  NEUROLOGICAL: No headaches, memory loss, loss of strength, numbness, or tremors  SKIN: No itching, burning, rashes, or lesions   LYMPH NODES: No enlarged glands  ENDOCRINE: No heat or cold intolerance; No hair loss  MUSCULOSKELETAL: No joint pain or swelling; No muscle, back, or extremity pain  PSYCHIATRIC: No depression, anxiety, mood swings, or difficulty sleeping  HEME/LYMPH: No easy bruising, or bleeding gums  ALLERGY AND IMMUNOLOGIC: No hives or eczema    Vital Signs Last 24 Hrs  T(C): 38.1 (07 Apr 2020 12:00), Max: 39.7 (06 Apr 2020 19:51)  T(F): 100.6 (07 Apr 2020 12:00), Max: 103.5 (06 Apr 2020 19:51)  HR: 96 (07 Apr 2020 12:00) (84 - 108)  BP: 143/87 (07 Apr 2020 12:00) (134/90 - 154/96)  BP(mean): --  RR: 18 (07 Apr 2020 12:00) (18 - 20)  SpO2: 96% (07 Apr 2020 12:00) (92% - 96%)    PHYSICAL EXAM:  GENERAL: NAD, anxious lethargic HEAD:  Atraumatic, Normocephalic  EYES: EOMI, PERRLA, conjunctiva and sclera clear  ENMT: No tonsillar erythema, exudates, or enlargement; Moist mucous membranes, Good dentition, No lesions  NECK: Supple, No JVD, Normal thyroid  NERVOUS SYSTEM:  Awake lethargic   CHEST/LUNG: Clear to percussion bilaterally; No rales, rhonchi, wheezing, or rubs  HEART: Regular rate and rhythm; No murmurs, rubs, or gallops  ABDOMEN: Soft, Nontender, Nondistended; Bowel sounds present  EXTREMITIES:  2+ Peripheral Pulses, No clubbing, cyanosis, or edema  LYMPH: No lymphadenopathy noted  SKIN: No rashes or lesions    LABS:                        10.8   5.69  )-----------( 429      ( 06 Apr 2020 04:54 )             34.5     04-07    145  |  113<H>  |  20  ----------------------------<  94  3.2<L>   |  22  |  1.25    Ca    8.5      07 Apr 2020 13:02  Phos  2.6     04-07  Mg     1.9     04-06    TPro  7.5  /  Alb  2.2<L>  /  TBili  0.4  /  DBili  x   /  AST  42<H>  /  ALT  23  /  AlkPhos  53  04-06      RADIOLOGY & ADDITIONAL TESTS:    Imaging Personally Reviewed:  [X ] YES  [ ] NO    Consultant(s) Notes Reviewed:  [X ] YES  [ ] NO    Care Discussed with Consultants/Other Providers [X ] YES  [ ] NO

## 2020-04-07 NOTE — SWALLOW BEDSIDE ASSESSMENT ADULT - COMMENTS
CXR 4/6/2020IMPRESSION:   Subtle asymmetric haziness overlying the RIGHT and hemithorax periphery concerning for infectious pneumonia. Continued surveillance recommended.

## 2020-04-07 NOTE — SWALLOW BEDSIDE ASSESSMENT ADULT - H & P REVIEW
yes/54M w/ ETtOH abuse w/ DTs. ADmitted w/ alcohol intoxication and subsequent withdrawal. Intubated for acute hypoxemic respiratory failure likely secondary to aspiration, now extubated. Also had septic shock which has now resolved and ROSA. pt extubated 4/6/2020

## 2020-04-07 NOTE — SWALLOW BEDSIDE ASSESSMENT ADULT - PHARYNGEAL PHASE
Delayed pharyngeal swallow/Multiple swallows Cough post oral intake/Multiple swallows/Delayed pharyngeal swallow Decreased laryngeal elevation/Multiple swallows/Cough post oral intake Delayed pharyngeal swallow/Cough post oral intake

## 2020-04-07 NOTE — SWALLOW BEDSIDE ASSESSMENT ADULT - SWALLOW EVAL: RECOMMENDED FEEDING/EATING TECHNIQUES
oral hygiene/crush medication (when feasible)/small sips/bites/allow for swallow between intakes/maintain upright posture during/after eating for 30 mins

## 2020-04-07 NOTE — PROGRESS NOTE ADULT - ASSESSMENT
54M w/ ETtOH abuse w/ DTs. ADmitted w/ alcohol intoxication and subsequent withdrawal. Intubated for acute hypoxemic respiratory failure likely secondary to aspiration,  extubated. Also had septic shock which has now resolved and ROSA.     - sphenobarb ATC for now, CIWA monitoring  -  WBC count normalized, COVID19- x 2; procal elevated  - COVID19 negative but continuefevers  - zosyn x 7 days ID has placed on meropenum no need for vancomycin as WBC count has normalized and fever may be due to COVID19  - MVI/Thiamine/Folate  - ROSA secondary to ATN, improving; phos repleted; monitor UOP and electrolytes      On hospitalist service     Appreciate ID Consult

## 2020-04-07 NOTE — PROGRESS NOTE ADULT - ASSESSMENT
54 yr old alcoholic male seen with   1.Acute respiratory failure : pt extubated now   reorder CT chest to r/o aspiration pneumonia vs COVID  pneumonia (more sp GGO ) as pt still has fever  now on zosyn ,still febrile so change to meropenem to cover ESBL   sputum normal heidi only  add vanco and check MRSA PCR    2.Fever/tachypnea/sepsis: COVID sent third time(COVID may be false negative)  do CT chest     3.Aspiraiton pneumonia : in the setting of neg covid twice and pt is alcoholic so high chances of developing aspiration   sputum c/s MRSA PCR   antibiotics as above    4.ROSA :sec to above/drugs  vanco sh be renally dosed

## 2020-04-08 LAB
ALBUMIN SERPL ELPH-MCNC: 2.2 G/DL — LOW (ref 3.3–5)
ALP SERPL-CCNC: 52 U/L — SIGNIFICANT CHANGE UP (ref 40–120)
ALT FLD-CCNC: 41 U/L — SIGNIFICANT CHANGE UP (ref 12–78)
ANION GAP SERPL CALC-SCNC: 11 MMOL/L — SIGNIFICANT CHANGE UP (ref 5–17)
AST SERPL-CCNC: 99 U/L — HIGH (ref 15–37)
BILIRUB SERPL-MCNC: 0.4 MG/DL — SIGNIFICANT CHANGE UP (ref 0.2–1.2)
BUN SERPL-MCNC: 20 MG/DL — SIGNIFICANT CHANGE UP (ref 7–23)
CALCIUM SERPL-MCNC: 8.8 MG/DL — SIGNIFICANT CHANGE UP (ref 8.5–10.1)
CHLORIDE SERPL-SCNC: 111 MMOL/L — HIGH (ref 96–108)
CO2 SERPL-SCNC: 21 MMOL/L — LOW (ref 22–31)
CREAT SERPL-MCNC: 1.32 MG/DL — HIGH (ref 0.5–1.3)
D DIMER BLD IA.RAPID-MCNC: 560 NG/ML DDU — HIGH
GLUCOSE SERPL-MCNC: 103 MG/DL — HIGH (ref 70–99)
HCT VFR BLD CALC: 36.6 % — LOW (ref 39–50)
HGB BLD-MCNC: 11.5 G/DL — LOW (ref 13–17)
MAGNESIUM SERPL-MCNC: 2.4 MG/DL — SIGNIFICANT CHANGE UP (ref 1.6–2.6)
MCHC RBC-ENTMCNC: 26.9 PG — LOW (ref 27–34)
MCHC RBC-ENTMCNC: 31.4 GM/DL — LOW (ref 32–36)
MCV RBC AUTO: 85.5 FL — SIGNIFICANT CHANGE UP (ref 80–100)
NRBC # BLD: 0 /100 WBCS — SIGNIFICANT CHANGE UP (ref 0–0)
PHOSPHATE SERPL-MCNC: 2.8 MG/DL — SIGNIFICANT CHANGE UP (ref 2.5–4.5)
PLATELET # BLD AUTO: 565 K/UL — HIGH (ref 150–400)
POTASSIUM SERPL-MCNC: 3.9 MMOL/L — SIGNIFICANT CHANGE UP (ref 3.5–5.3)
POTASSIUM SERPL-SCNC: 3.9 MMOL/L — SIGNIFICANT CHANGE UP (ref 3.5–5.3)
PROT SERPL-MCNC: 8.6 GM/DL — HIGH (ref 6–8.3)
RBC # BLD: 4.28 M/UL — SIGNIFICANT CHANGE UP (ref 4.2–5.8)
RBC # FLD: 16.3 % — HIGH (ref 10.3–14.5)
SODIUM SERPL-SCNC: 143 MMOL/L — SIGNIFICANT CHANGE UP (ref 135–145)
WBC # BLD: 6.15 K/UL — SIGNIFICANT CHANGE UP (ref 3.8–10.5)
WBC # FLD AUTO: 6.15 K/UL — SIGNIFICANT CHANGE UP (ref 3.8–10.5)

## 2020-04-08 PROCEDURE — 99233 SBSQ HOSP IP/OBS HIGH 50: CPT

## 2020-04-08 PROCEDURE — 71250 CT THORAX DX C-: CPT | Mod: 26

## 2020-04-08 RX ORDER — HYDROXYCHLOROQUINE SULFATE 200 MG
200 TABLET ORAL EVERY 12 HOURS
Refills: 0 | Status: COMPLETED | OUTPATIENT
Start: 2020-04-09 | End: 2020-04-13

## 2020-04-08 RX ORDER — HYDROXYCHLOROQUINE SULFATE 200 MG
400 TABLET ORAL EVERY 12 HOURS
Refills: 0 | Status: COMPLETED | OUTPATIENT
Start: 2020-04-08 | End: 2020-04-09

## 2020-04-08 RX ORDER — POTASSIUM CHLORIDE 20 MEQ
40 PACKET (EA) ORAL EVERY 4 HOURS
Refills: 0 | Status: COMPLETED | OUTPATIENT
Start: 2020-04-08 | End: 2020-04-08

## 2020-04-08 RX ORDER — HYDROXYCHLOROQUINE SULFATE 200 MG
TABLET ORAL
Refills: 0 | Status: COMPLETED | OUTPATIENT
Start: 2020-04-08 | End: 2020-04-13

## 2020-04-08 RX ADMIN — Medication 1 TABLET(S): at 12:41

## 2020-04-08 RX ADMIN — Medication 1 GRAM(S): at 07:10

## 2020-04-08 RX ADMIN — Medication 1 GRAM(S): at 00:43

## 2020-04-08 RX ADMIN — PIPERACILLIN AND TAZOBACTAM 25 GRAM(S): 4; .5 INJECTION, POWDER, LYOPHILIZED, FOR SOLUTION INTRAVENOUS at 00:37

## 2020-04-08 RX ADMIN — Medication 25 MILLIGRAM(S): at 07:09

## 2020-04-08 RX ADMIN — Medication 2 MILLIGRAM(S): at 02:37

## 2020-04-08 RX ADMIN — Medication 130 MILLIGRAM(S): at 00:42

## 2020-04-08 RX ADMIN — Medication 100 MILLIGRAM(S): at 12:43

## 2020-04-08 RX ADMIN — MEROPENEM 100 MILLIGRAM(S): 1 INJECTION INTRAVENOUS at 23:50

## 2020-04-08 RX ADMIN — Medication 1 MILLIGRAM(S): at 12:34

## 2020-04-08 RX ADMIN — Medication 40 MILLIEQUIVALENT(S): at 12:34

## 2020-04-08 RX ADMIN — Medication 400 MILLIGRAM(S): at 23:49

## 2020-04-08 RX ADMIN — Medication 130 MILLIGRAM(S): at 07:09

## 2020-04-08 RX ADMIN — MEROPENEM 100 MILLIGRAM(S): 1 INJECTION INTRAVENOUS at 16:16

## 2020-04-08 RX ADMIN — MEROPENEM 100 MILLIGRAM(S): 1 INJECTION INTRAVENOUS at 07:09

## 2020-04-08 RX ADMIN — Medication 130 MILLIGRAM(S): at 18:23

## 2020-04-08 RX ADMIN — ATORVASTATIN CALCIUM 20 MILLIGRAM(S): 80 TABLET, FILM COATED ORAL at 00:42

## 2020-04-08 RX ADMIN — Medication 40 MILLIEQUIVALENT(S): at 18:22

## 2020-04-08 RX ADMIN — CHLORHEXIDINE GLUCONATE 1 APPLICATION(S): 213 SOLUTION TOPICAL at 07:09

## 2020-04-08 RX ADMIN — PANTOPRAZOLE SODIUM 40 MILLIGRAM(S): 20 TABLET, DELAYED RELEASE ORAL at 12:35

## 2020-04-08 RX ADMIN — ENOXAPARIN SODIUM 40 MILLIGRAM(S): 100 INJECTION SUBCUTANEOUS at 12:34

## 2020-04-08 RX ADMIN — Medication 130 MILLIGRAM(S): at 12:42

## 2020-04-08 RX ADMIN — ATORVASTATIN CALCIUM 20 MILLIGRAM(S): 80 TABLET, FILM COATED ORAL at 23:49

## 2020-04-08 RX ADMIN — Medication 1 GRAM(S): at 23:50

## 2020-04-08 RX ADMIN — Medication 25 MILLIGRAM(S): at 18:22

## 2020-04-08 RX ADMIN — Medication 1 GRAM(S): at 18:23

## 2020-04-08 RX ADMIN — MEROPENEM 100 MILLIGRAM(S): 1 INJECTION INTRAVENOUS at 00:42

## 2020-04-08 RX ADMIN — Medication 1 GRAM(S): at 12:42

## 2020-04-08 RX ADMIN — Medication 130 MILLIGRAM(S): at 23:50

## 2020-04-08 RX ADMIN — PIPERACILLIN AND TAZOBACTAM 25 GRAM(S): 4; .5 INJECTION, POWDER, LYOPHILIZED, FOR SOLUTION INTRAVENOUS at 07:10

## 2020-04-08 NOTE — PROGRESS NOTE ADULT - SUBJECTIVE AND OBJECTIVE BOX
Patient is a 54y old  Male who presents with a chief complaint of Abdominal Pain (07 Apr 2020 16:22)      INTERVAL HPI/OVERNIGHT EVENTS: no acute events. breathing stable on NC. no distress     MEDICATIONS  (STANDING):  atorvastatin 20 milliGRAM(s) Oral at bedtime  chlorhexidine 2% Cloths 1 Application(s) Topical <User Schedule>  chlorhexidine 4% Liquid 1 Application(s) Topical <User Schedule>  enoxaparin Injectable 40 milliGRAM(s) SubCutaneous daily  folic acid 1 milliGRAM(s) Oral daily  meropenem  IVPB 1000 milliGRAM(s) IV Intermittent every 8 hours  metoprolol tartrate 25 milliGRAM(s) Oral two times a day  multivitamin 1 Tablet(s) Oral daily  pantoprazole   Suspension 40 milliGRAM(s) Oral daily  PHENobarbital Injectable 130 milliGRAM(s) IV Push every 6 hours  potassium chloride    Tablet ER 40 milliEquivalent(s) Oral every 4 hours  sucralfate suspension 1 Gram(s) Oral four times a day  thiamine 100 milliGRAM(s) Oral daily    MEDICATIONS  (PRN):  acetaminophen   Tablet .. 650 milliGRAM(s) Oral every 4 hours PRN Temp greater or equal to 38C (100.4F), Mild Pain (1 - 3)  acetaminophen  Suppository .. 650 milliGRAM(s) Rectal every 6 hours PRN Temp greater or equal to 38C (100.4F)  LORazepam   Injectable 2 milliGRAM(s) IV Push every 4 hours PRN Agitation  sodium chloride 0.9% lock flush 10 milliLiter(s) IV Push every 1 hour PRN Pre/post blood products, medications, blood draw, and to maintain line patency      Allergies    No Known Allergies    Intolerances        REVIEW OF SYSTEMS:  CONSTITUTIONAL: No fever, weight loss, or fatigue  EYES: No eye pain, visual disturbances, or discharge  ENMT:  No difficulty hearing, tinnitus, vertigo; No sinus or throat pain  NECK: No pain or stiffness  BREASTS: No pain, masses, or nipple discharge  RESPIRATORY: No cough, wheezing, chills or hemoptysis; No shortness of breath  CARDIOVASCULAR: No chest pain, palpitations, dizziness, or leg swelling  GASTROINTESTINAL: No abdominal or epigastric pain. No nausea, vomiting, or hematemesis; No diarrhea or constipation. No melena or hematochezia.  GENITOURINARY: No dysuria, frequency, hematuria, or incontinence  NEUROLOGICAL: No headaches, memory loss, loss of strength, numbness, or tremors  SKIN: No itching, burning, rashes, or lesions   LYMPH NODES: No enlarged glands  ENDOCRINE: No heat or cold intolerance; No hair loss  MUSCULOSKELETAL: No joint pain or swelling; No muscle, back, or extremity pain  PSYCHIATRIC: No depression, anxiety, mood swings, or difficulty sleeping  HEME/LYMPH: No easy bruising, or bleeding gums  ALLERGY AND IMMUNOLOGIC: No hives or eczema    Vital Signs Last 24 Hrs  T(C): 36.8 (08 Apr 2020 11:30), Max: 38.4 (07 Apr 2020 19:33)  T(F): 98.2 (08 Apr 2020 11:30), Max: 101.2 (07 Apr 2020 19:33)  HR: 86 (08 Apr 2020 11:30) (86 - 102)  BP: 137/89 (08 Apr 2020 11:30) (134/89 - 145/85)  BP(mean): --  RR: 17 (08 Apr 2020 11:30) (17 - 18)  SpO2: 97% (08 Apr 2020 11:30) (94% - 97%)    PHYSICAL EXAM:  GENERAL: NAD, well-groomed, well-developed  HEAD:  Atraumatic, Normocephalic  EYES: EOMI, PERRLA, conjunctiva and sclera clear  ENMT: No tonsillar erythema, exudates, or enlargement; Moist mucous membranes, Good dentition, No lesions  NECK: Supple, No JVD, Normal thyroid  NERVOUS SYSTEM:  Alert & Oriented X2, Good concentration; Motor Strength 5/5 B/L upper and lower extremities; DTRs 2+ intact and symmetric  CHEST/LUNG: Clear to percussion bilaterally; No rales, rhonchi, wheezing, or rubs  HEART: Regular rate and rhythm; No murmurs, rubs, or gallops  ABDOMEN: Soft, Nontender, Nondistended; Bowel sounds present  EXTREMITIES:  2+ Peripheral Pulses, No clubbing, cyanosis, or edema  LYMPH: No lymphadenopathy noted  SKIN: No rashes or lesions    LABS:                        11.5   6.15  )-----------( 565      ( 08 Apr 2020 11:29 )             36.6     04-08    143  |  111<H>  |  20  ----------------------------<  103<H>  3.9   |  21<L>  |  1.32<H>    Ca    8.8      08 Apr 2020 11:29  Phos  2.8     04-08  Mg     2.4     04-08    TPro  8.6<H>  /  Alb  2.2<L>  /  TBili  0.4  /  DBili  x   /  AST  99<H>  /  ALT  41  /  AlkPhos  52  04-08        CAPILLARY BLOOD GLUCOSE          RADIOLOGY & ADDITIONAL TESTS:    Imaging Personally Reviewed:  [ ] YES  [ ] NO    Consultant(s) Notes Reviewed:  [ ] YES  [ ] NO    Care Discussed with Consultants/Other Providers [ ] YES  [ ] NO

## 2020-04-08 NOTE — PROGRESS NOTE ADULT - ASSESSMENT
54M w/ ETtOH abuse w/ DTs. ADmitted w/ alcohol intoxication and subsequent withdrawal. Intubated for acute hypoxemic respiratory failure likely secondary to aspiration,  extubated. Also had septic shock which has now resolved and ROSA.     ETOH withdrawel   - phenobarb ATC for now, CIWA monitoring    fever   - 2/2 gram negative PNA   -   [id number] x 2; procal elevated  - zosyn x 7 days ID has placed on meropenum as fevers persist. Ct shows MF PNA. check MRSA PCR   - MVI/Thiamine/Folate  - doing well on 2L NC. attempt to ween tomorrow      ROSA secondary to ATN, improving; phos repleted; monitor UOP and electrolytes  - remove saxena tomorrow

## 2020-04-09 LAB
ANION GAP SERPL CALC-SCNC: 9 MMOL/L — SIGNIFICANT CHANGE UP (ref 5–17)
BUN SERPL-MCNC: 17 MG/DL — SIGNIFICANT CHANGE UP (ref 7–23)
CALCIUM SERPL-MCNC: 8.7 MG/DL — SIGNIFICANT CHANGE UP (ref 8.5–10.1)
CHLORIDE SERPL-SCNC: 113 MMOL/L — HIGH (ref 96–108)
CO2 SERPL-SCNC: 23 MMOL/L — SIGNIFICANT CHANGE UP (ref 22–31)
CREAT SERPL-MCNC: 1.11 MG/DL — SIGNIFICANT CHANGE UP (ref 0.5–1.3)
GLUCOSE SERPL-MCNC: 80 MG/DL — SIGNIFICANT CHANGE UP (ref 70–99)
HCT VFR BLD CALC: 33 % — LOW (ref 39–50)
HGB BLD-MCNC: 10.5 G/DL — LOW (ref 13–17)
MCHC RBC-ENTMCNC: 27.3 PG — SIGNIFICANT CHANGE UP (ref 27–34)
MCHC RBC-ENTMCNC: 31.8 GM/DL — LOW (ref 32–36)
MCV RBC AUTO: 85.7 FL — SIGNIFICANT CHANGE UP (ref 80–100)
NRBC # BLD: 0 /100 WBCS — SIGNIFICANT CHANGE UP (ref 0–0)
PLATELET # BLD AUTO: 657 K/UL — HIGH (ref 150–400)
POTASSIUM SERPL-MCNC: 3.8 MMOL/L — SIGNIFICANT CHANGE UP (ref 3.5–5.3)
POTASSIUM SERPL-SCNC: 3.8 MMOL/L — SIGNIFICANT CHANGE UP (ref 3.5–5.3)
RBC # BLD: 3.85 M/UL — LOW (ref 4.2–5.8)
RBC # FLD: 16.5 % — HIGH (ref 10.3–14.5)
SARS-COV-2 RNA SPEC QL NAA+PROBE: SIGNIFICANT CHANGE UP
SODIUM SERPL-SCNC: 145 MMOL/L — SIGNIFICANT CHANGE UP (ref 135–145)
WBC # BLD: 5.8 K/UL — SIGNIFICANT CHANGE UP (ref 3.8–10.5)
WBC # FLD AUTO: 5.8 K/UL — SIGNIFICANT CHANGE UP (ref 3.8–10.5)

## 2020-04-09 PROCEDURE — 99232 SBSQ HOSP IP/OBS MODERATE 35: CPT

## 2020-04-09 RX ORDER — PANTOPRAZOLE SODIUM 20 MG/1
40 TABLET, DELAYED RELEASE ORAL
Refills: 0 | Status: DISCONTINUED | OUTPATIENT
Start: 2020-04-09 | End: 2020-04-14

## 2020-04-09 RX ADMIN — Medication 2 MILLIGRAM(S): at 14:20

## 2020-04-09 RX ADMIN — Medication 130 MILLIGRAM(S): at 14:01

## 2020-04-09 RX ADMIN — Medication 1 GRAM(S): at 23:31

## 2020-04-09 RX ADMIN — Medication 2 MILLIGRAM(S): at 10:15

## 2020-04-09 RX ADMIN — Medication 1 TABLET(S): at 13:56

## 2020-04-09 RX ADMIN — PANTOPRAZOLE SODIUM 40 MILLIGRAM(S): 20 TABLET, DELAYED RELEASE ORAL at 13:56

## 2020-04-09 RX ADMIN — Medication 1 GRAM(S): at 18:37

## 2020-04-09 RX ADMIN — Medication 130 MILLIGRAM(S): at 06:00

## 2020-04-09 RX ADMIN — Medication 1 GRAM(S): at 06:00

## 2020-04-09 RX ADMIN — MEROPENEM 100 MILLIGRAM(S): 1 INJECTION INTRAVENOUS at 13:57

## 2020-04-09 RX ADMIN — Medication 130 MILLIGRAM(S): at 23:31

## 2020-04-09 RX ADMIN — CHLORHEXIDINE GLUCONATE 1 APPLICATION(S): 213 SOLUTION TOPICAL at 05:28

## 2020-04-09 RX ADMIN — ENOXAPARIN SODIUM 40 MILLIGRAM(S): 100 INJECTION SUBCUTANEOUS at 14:01

## 2020-04-09 RX ADMIN — Medication 400 MILLIGRAM(S): at 09:41

## 2020-04-09 RX ADMIN — MEROPENEM 100 MILLIGRAM(S): 1 INJECTION INTRAVENOUS at 06:00

## 2020-04-09 RX ADMIN — MEROPENEM 100 MILLIGRAM(S): 1 INJECTION INTRAVENOUS at 23:31

## 2020-04-09 RX ADMIN — Medication 1 GRAM(S): at 13:56

## 2020-04-09 RX ADMIN — Medication 25 MILLIGRAM(S): at 06:00

## 2020-04-09 RX ADMIN — Medication 200 MILLIGRAM(S): at 23:31

## 2020-04-09 RX ADMIN — Medication 1 MILLIGRAM(S): at 13:56

## 2020-04-09 RX ADMIN — Medication 100 MILLIGRAM(S): at 13:56

## 2020-04-09 RX ADMIN — Medication 2 MILLIGRAM(S): at 18:36

## 2020-04-09 RX ADMIN — Medication 25 MILLIGRAM(S): at 18:37

## 2020-04-09 RX ADMIN — ATORVASTATIN CALCIUM 20 MILLIGRAM(S): 80 TABLET, FILM COATED ORAL at 23:31

## 2020-04-09 RX ADMIN — Medication 130 MILLIGRAM(S): at 18:38

## 2020-04-09 NOTE — PROGRESS NOTE ADULT - ASSESSMENT
54 yr old alcoholic male seen with   1.Aspiration pneumonia :   CT chest shows possible  COVID  pneumonia (more sp GGO ) as pt still has fever  cont meropenem to cover ESBL gram negative  sputum normal heidi only  add vanco and check MRSA PCR pending  procalcitonin  sepsis resolved    2.Likely COVID pneumonia : based on CT chest finding    cont plaqaunil day 2   cont isolation   trend markers      4.ROSA :sec to above/drugs  vanco sh be renally dosed  resolved

## 2020-04-09 NOTE — PROGRESS NOTE ADULT - SUBJECTIVE AND OBJECTIVE BOX
Patient is a 54y old  Male who presents with a chief complaint of Abdominal Pain (09 Apr 2020 10:19)      INTERVAL HPI / OVERNIGHT EVENTS: no new events,fever is resolving    MEDICATIONS  (STANDING):  atorvastatin 20 milliGRAM(s) Oral at bedtime  chlorhexidine 2% Cloths 1 Application(s) Topical <User Schedule>  chlorhexidine 4% Liquid 1 Application(s) Topical <User Schedule>  enoxaparin Injectable 40 milliGRAM(s) SubCutaneous daily  folic acid 1 milliGRAM(s) Oral daily  hydroxychloroquine   Oral   hydroxychloroquine 200 milliGRAM(s) Oral every 12 hours  meropenem  IVPB 1000 milliGRAM(s) IV Intermittent every 8 hours  metoprolol tartrate 25 milliGRAM(s) Oral two times a day  multivitamin 1 Tablet(s) Oral daily  pantoprazole    Tablet 40 milliGRAM(s) Oral before breakfast  PHENobarbital Injectable 130 milliGRAM(s) IV Push every 6 hours  sucralfate suspension 1 Gram(s) Oral four times a day  thiamine 100 milliGRAM(s) Oral daily    MEDICATIONS  (PRN):  acetaminophen   Tablet .. 650 milliGRAM(s) Oral every 4 hours PRN Temp greater or equal to 38C (100.4F), Mild Pain (1 - 3)  acetaminophen  Suppository .. 650 milliGRAM(s) Rectal every 6 hours PRN Temp greater or equal to 38C (100.4F)  LORazepam   Injectable 2 milliGRAM(s) IV Push every 4 hours PRN Agitation  sodium chloride 0.9% lock flush 10 milliLiter(s) IV Push every 1 hour PRN Pre/post blood products, medications, blood draw, and to maintain line patency      Vital Signs Last 24 Hrs  T(C): 36.5 (09 Apr 2020 16:31), Max: 36.8 (09 Apr 2020 00:30)  T(F): 97.7 (09 Apr 2020 16:31), Max: 98.2 (09 Apr 2020 00:30)  HR: 78 (09 Apr 2020 16:31) (77 - 92)  BP: 136/78 (09 Apr 2020 16:31) (118/70 - 142/87)  BP(mean): --  RR: 17 (09 Apr 2020 16:31) (17 - 18)  SpO2: 99% (09 Apr 2020 16:31) (95% - 100%)    Review of systems:  General : no fever /chills, fatigue  CVS : no chest pain, palpitations  Lungs : + shortness of breath, cough  GI : no abdominal pain, vomiting, diarrhea   : no dysuria, hematuria        PHYSICAL EXAM:  General :NAD  Constitutional:  well-groomed, well-developed  Respiratory: CTAB/L  Cardiovascular: S1 and S2, RRR, no M/G/R  Gastrointestinal: BS+, soft, NT/ND  Extremities: No peripheral edema  Vascular: 2+ peripheral pulses  Skin: No rashes      LABS:                        10.5   5.80  )-----------( 657      ( 09 Apr 2020 07:34 )             33.0     04-09    145  |  113<H>  |  17  ----------------------------<  80  3.8   |  23  |  1.11    Ca    8.7      09 Apr 2020 07:34  Phos  2.8     04-08  Mg     2.4     04-08    TPro  8.6<H>  /  Alb  2.2<L>  /  TBili  0.4  /  DBili  x   /  AST  99<H>  /  ALT  41  /  AlkPhos  52  04-08          MICROBIOLOGY:  RECENT CULTURES:  04-05 .Sputum Sputum XXXX   Few polymorphonuclear leukocytes per low power field  Few Squamous epithelial cells per low power field  Rare Gram Positive Cocci in Clusters per oil power field   Normal Respiratory Melanie present    04-02 .Blood Blood XXXX XXXX   No Growth Final    04-02 .Blood Blood XXXX XXXX   No Growth Final          RADIOLOGY & ADDITIONAL STUDIES:    CT chest :     IMPRESSION:     Multifocal bilateral pneumonia. The appearance is consistent with a viral/atypical etiology including covid 19

## 2020-04-09 NOTE — CHART NOTE - NSCHARTNOTEFT_GEN_A_CORE
Medicine Hospitalist PA    Was called at RN at 9AM pt fell at 8:40. Pt was then seen and examined. Pt states he feels fine, in no distress. States he was trying to get out of bed to shower and clean up but then fell on floor buttocks down. Pt fall was witnessed by aide. no head trauma, no LOC. ROM intact.    Vital Signs Last 24 Hrs  T(C): 36.1 (09 Apr 2020 10:49), Max: 37.2 (08 Apr 2020 17:29)  T(F): 97 (09 Apr 2020 10:49), Max: 99 (08 Apr 2020 17:29)  HR: 88 (09 Apr 2020 10:49) (77 - 92)  BP: 132/77 (09 Apr 2020 10:49) (118/70 - 148/75)  RR: 18 (09 Apr 2020 10:49) (17 - 18)  SpO2: 100% (09 Apr 2020 10:49) (95% - 100%)    General: awake  HEENT: EOM intact, LELO, tongue midline   CV: S1 S2  Resp: Good air entry b/l, no wheezing, no rhonchi  Abd: Soft, NT/ND, BS+  Ext: no LE edema, pulses intact, no calf tenderness, Full ROM in all Extremities  Neuro: good finger , no arm/leg drift, sensations intact, no facial droop     A/P: 54 YOM with PMHx of etoh abuse pt r/o COVID 19. Now s/p fall.  - Pt with no head trauma or LOC  - FROM in all extremities  - Pain management with tylenol  - Continue to monitor

## 2020-04-09 NOTE — PROGRESS NOTE ADULT - ASSESSMENT
54M w/ ETtOH abuse w/ DTs. ADmitted w/ alcohol intoxication and subsequent withdrawal. Intubated for acute hypoxemic respiratory failure likely secondary to aspiration,  extubated. Also had septic shock which has now resolved and ROSA.     ETOH withdrawel   - phenobarb ATC for now, CIWA monitoring    fever   - 2/2 gram negative PNA   -   [id number] x 2; procal elevated  - zosyn x 7 days ID has placed on meropenum as fevers persist. Ct shows MF PNA. check MRSA PCR   - MVI/Thiamine/Folate  - doing well on 2L NC. attempt to ween tomorrow      ROSA secondary to ATN, improving; phos repleted; monitor UOP and electrolytes  - remove saxena tomorrow 54M w/ ETtOH abuse w/ DTs. ADmitted w/ alcohol intoxication and subsequent withdrawal. Intubated for acute hypoxemic respiratory failure likely secondary to aspiration,  extubated. Also had septic shock which has now resolved and ROSA.     ETOH withdrawel   - phenobarb ATC for now, CIWA monitoring    fever   - 2/2 gram negative PNA   -   [id number] x 2; procal elevated  - zosyn x 7 days ID has placed on meropenum as fevers persist. Ct shows MF PNA. check MRSA PCR   - MVI/Thiamine/Folate. Doing well on 2L NC. attempt to ween tomorrow      ROSA secondary to ATN, improving; phos repleted; monitor UOP and electrolytes  - remove saxena tomorrow 54M w/ ETtOH abuse w/ DTs. ADmitted w/ alcohol intoxication and subsequent withdrawal. Intubated for acute hypoxemic respiratory failure likely secondary to aspiration,  extubated. Also had septic shock which has now resolved and ROSA.     ETOH withdrawel   - phenobarb 130 mg IVP every 6 hours, CIWA monitoring    fever   - 2/2 gram negative PNA   -   COVID19 negative x 2; procal elevated  - zosyn x 7 days. ID has placed on Meropenum 1,000 every 8 hours as fevers persist. Ct shows MF PNA. check MRSA PCR   - MVI/Thiamine/Folate. Doing well on 2L NC. attempt to ween tomorrow      ROSA secondary to ATN, improving; phos repleted; monitor UOP and electrolytes  - remove saxena tomorrow. 54M w/ ETtOH abuse w/ DTs. ADmitted w/ alcohol intoxication and subsequent withdrawal. Intubated for acute hypoxemic respiratory failure likely secondary to aspiration,  extubated. Also had septic shock which has now resolved and ROSA.     ETOH withdrawel   - phenobarb 130 mg IVP every 6 hours, CIWA monitoring    fever   - 2/2 gram negative PNA . COVID-19 negative x 2; procal elevated  - zosyn x 7 days. ID has placed on Meropenum 1,000 every 8 hours as fevers persist for presumed aspiration pneumonia. CT chest shows   lower lobe pneumonia consistent with COVID. MVI/Thiamine/Folate. Doing well on 2L NC. attempt to ween tomorrow.   TOV soon in AM.       ROSA secondary to ATN, improving; phos repleted; monitor UOP and electrolytes  - remove saxena AM.

## 2020-04-09 NOTE — PROGRESS NOTE ADULT - SUBJECTIVE AND OBJECTIVE BOX
INTERVAL HPI/OVERNIGHT EVENTS:  Pt seen and examined at bedside.     Allergies/Intolerance: No Known Allergies      MEDICATIONS  (STANDING):  atorvastatin 20 milliGRAM(s) Oral at bedtime  chlorhexidine 2% Cloths 1 Application(s) Topical <User Schedule>  chlorhexidine 4% Liquid 1 Application(s) Topical <User Schedule>  enoxaparin Injectable 40 milliGRAM(s) SubCutaneous daily  folic acid 1 milliGRAM(s) Oral daily  hydroxychloroquine   Oral   hydroxychloroquine 200 milliGRAM(s) Oral every 12 hours  meropenem  IVPB 1000 milliGRAM(s) IV Intermittent every 8 hours  metoprolol tartrate 25 milliGRAM(s) Oral two times a day  multivitamin 1 Tablet(s) Oral daily  pantoprazole   Suspension 40 milliGRAM(s) Oral daily  PHENobarbital Injectable 130 milliGRAM(s) IV Push every 6 hours  sucralfate suspension 1 Gram(s) Oral four times a day  thiamine 100 milliGRAM(s) Oral daily    MEDICATIONS  (PRN):  acetaminophen   Tablet .. 650 milliGRAM(s) Oral every 4 hours PRN Temp greater or equal to 38C (100.4F), Mild Pain (1 - 3)  acetaminophen  Suppository .. 650 milliGRAM(s) Rectal every 6 hours PRN Temp greater or equal to 38C (100.4F)  LORazepam   Injectable 2 milliGRAM(s) IV Push every 4 hours PRN Agitation  sodium chloride 0.9% lock flush 10 milliLiter(s) IV Push every 1 hour PRN Pre/post blood products, medications, blood draw, and to maintain line patency        ROS: all systems reviewed and wnl      PHYSICAL EXAMINATION:  Vital Signs Last 24 Hrs  T(C): 36.1 (09 Apr 2020 08:40), Max: 37.2 (08 Apr 2020 17:29)  T(F): 96.9 (09 Apr 2020 08:40), Max: 99 (08 Apr 2020 17:29)  HR: 92 (09 Apr 2020 10:13) (77 - 92)  BP: 118/70 (09 Apr 2020 10:13) (118/70 - 148/75)  BP(mean): --  RR: 18 (09 Apr 2020 10:13) (17 - 18)  SpO2: 96% (09 Apr 2020 10:13) (95% - 99%)  CAPILLARY BLOOD GLUCOSE          04-08 @ 07:01  -  04-09 @ 07:00  --------------------------------------------------------  IN: 180 mL / OUT: 450 mL / NET: -270 mL        GENERAL:   NECK: supple, No JVD  CHEST/LUNG: clear to auscultation bilaterally; no rales, rhonchi, or wheezing b/l  HEART: normal S1, S2  ABDOMEN: BS+, soft, ND, NT   EXTREMITIES:  pulses palpable; no clubbing, cyanosis, or edema b/l LEs  SKIN: no rashes or lesions      LABS:                        10.5   5.80  )-----------( 657      ( 09 Apr 2020 07:34 )             33.0     04-09    145  |  113<H>  |  17  ----------------------------<  80  3.8   |  23  |  1.11    Ca    8.7      09 Apr 2020 07:34  Phos  2.8     04-08  Mg     2.4     04-08    TPro  8.6<H>  /  Alb  2.2<L>  /  TBili  0.4  /  DBili  x   /  AST  99<H>  /  ALT  41  /  AlkPhos  52  04-08 INTERVAL HPI/OVERNIGHT EVENTS:  Pt seen and examined at bedside.     Allergies/Intolerance: No Known Allergies      MEDICATIONS  (STANDING):  atorvastatin 20 milliGRAM(s) Oral at bedtime  chlorhexidine 2% Cloths 1 Application(s) Topical <User Schedule>  chlorhexidine 4% Liquid 1 Application(s) Topical <User Schedule>  enoxaparin Injectable 40 milliGRAM(s) SubCutaneous daily  folic acid 1 milliGRAM(s) Oral daily  hydroxychloroquine   Oral   hydroxychloroquine 200 milliGRAM(s) Oral every 12 hours  meropenem  IVPB 1000 milliGRAM(s) IV Intermittent every 8 hours  metoprolol tartrate 25 milliGRAM(s) Oral two times a day  multivitamin 1 Tablet(s) Oral daily  pantoprazole   Suspension 40 milliGRAM(s) Oral daily  PHENobarbital Injectable 130 milliGRAM(s) IV Push every 6 hours  sucralfate suspension 1 Gram(s) Oral four times a day  thiamine 100 milliGRAM(s) Oral daily    MEDICATIONS  (PRN):  acetaminophen   Tablet .. 650 milliGRAM(s) Oral every 4 hours PRN Temp greater or equal to 38C (100.4F), Mild Pain (1 - 3)  acetaminophen  Suppository .. 650 milliGRAM(s) Rectal every 6 hours PRN Temp greater or equal to 38C (100.4F)  LORazepam   Injectable 2 milliGRAM(s) IV Push every 4 hours PRN Agitation  sodium chloride 0.9% lock flush 10 milliLiter(s) IV Push every 1 hour PRN Pre/post blood products, medications, blood draw, and to maintain line patency        ROS: all systems reviewed and wnl      PHYSICAL EXAMINATION:  Vital Signs Last 24 Hrs  T(C): 36.1 (09 Apr 2020 08:40), Max: 37.2 (08 Apr 2020 17:29)  T(F): 96.9 (09 Apr 2020 08:40), Max: 99 (08 Apr 2020 17:29)  HR: 92 (09 Apr 2020 10:13) (77 - 92)  BP: 118/70 (09 Apr 2020 10:13) (118/70 - 148/75)  BP(mean): --  RR: 18 (09 Apr 2020 10:13) (17 - 18)  SpO2: 96% (09 Apr 2020 10:13) (95% - 99%)  CAPILLARY BLOOD GLUCOSE          04-08 @ 07:01  -  04-09 @ 07:00  --------------------------------------------------------  IN: 180 mL / OUT: 450 mL / NET: -270 mL        GENERAL: stable in bed, NAD, responds to name, no fevers or cough  NECK: supple, No JVD  CHEST/LUNG: clear to auscultation bilaterally; no rales, rhonchi, or wheezing b/l  HEART: normal S1, S2  ABDOMEN: BS+, soft, ND, NT   EXTREMITIES:  pulses palpable; no clubbing, cyanosis, or edema b/l LEs  SKIN: no rashes or lesions      LABS:                        10.5   5.80  )-----------( 657      ( 09 Apr 2020 07:34 )             33.0     04-09    145  |  113<H>  |  17  ----------------------------<  80  3.8   |  23  |  1.11    Ca    8.7      09 Apr 2020 07:34  Phos  2.8     04-08  Mg     2.4     04-08    TPro  8.6<H>  /  Alb  2.2<L>  /  TBili  0.4  /  DBili  x   /  AST  99<H>  /  ALT  41  /  AlkPhos  52  04-08

## 2020-04-09 NOTE — CHART NOTE - NSCHARTNOTEFT_GEN_A_CORE
Assessment: Pt sen for follow-up. Pt with alcohol intoxication and withdrawal, s/p intubation 4/2 for acute hypoxemic respiratory failure likely due to aspiration; s/p extubation 4/4. Noted pt COVID negative x 2.    Factors impacting intake: [ ] none [ ] nausea  [ ] vomiting [ ] diarrhea [ ] constipation  [ ]chewing problems [x] swallowing issues  [x] other: reduction in appetite    Diet Prescription: Diet, Dysphagia 1 Pureed-Honey Consistency Fluid (04-07-20 @ 14:13)    Intake: Po intake ~50% per nursing staff; Pt assisted with feeding    Current Weight: 69.8 kg (4/9), 70 kg (4/2)  % Weight Change: 0.3% (0.2 kg) wt loss x 1 week    Physical appearance: 2+ generalized edema; Nutrition focused physical exam conducted; Subcutaneous fat loss: [mild] Orbital fat pads region, [WNL]Buccal fat region, [mild]Triceps region,  [unable]Ribs region.  Muscle wasting: [moderate]Temples region, [mild]Clavicle region, [mild]Shoulder region, [unable]Scapula region, [mild]Interosseous region,  [mild/moderate]thigh region, [mild]Calf region    Pertinent Medications: MEDICATIONS  (STANDING):  atorvastatin 20 milliGRAM(s) Oral at bedtime  chlorhexidine 2% Cloths 1 Application(s) Topical <User Schedule>  chlorhexidine 4% Liquid 1 Application(s) Topical <User Schedule>  enoxaparin Injectable 40 milliGRAM(s) SubCutaneous daily  folic acid 1 milliGRAM(s) Oral daily  hydroxychloroquine   Oral   hydroxychloroquine 200 milliGRAM(s) Oral every 12 hours  meropenem  IVPB 1000 milliGRAM(s) IV Intermittent every 8 hours  metoprolol tartrate 25 milliGRAM(s) Oral two times a day  multivitamin 1 Tablet(s) Oral daily  pantoprazole   Suspension 40 milliGRAM(s) Oral daily  PHENobarbital Injectable 130 milliGRAM(s) IV Push every 6 hours  sucralfate suspension 1 Gram(s) Oral four times a day  thiamine 100 milliGRAM(s) Oral daily    MEDICATIONS  (PRN):  acetaminophen   Tablet .. 650 milliGRAM(s) Oral every 4 hours PRN Temp greater or equal to 38C (100.4F), Mild Pain (1 - 3)  acetaminophen  Suppository .. 650 milliGRAM(s) Rectal every 6 hours PRN Temp greater or equal to 38C (100.4F)  LORazepam   Injectable 2 milliGRAM(s) IV Push every 4 hours PRN Agitation  sodium chloride 0.9% lock flush 10 milliLiter(s) IV Push every 1 hour PRN Pre/post blood products, medications, blood draw, and to maintain line patency    Pertinent Labs: 04-09 Na145 mmol/L Glu 80 mg/dL K+ 3.8 mmol/L Cr  1.11 mg/dL BUN 17 mg/dL 04-08 Phos 2.8 mg/dL 04-08 Alb 2.2 g/dL<L>    CAPILLARY BLOOD GLUCOSE    Skin: WDL    Estimated Needs:   [x] no change since previous assessment on 4/3/20  [ ] recalculated:     Previous Nutrition Diagnosis:  Malnutrition Moderate Malnutrition in the context of Acute illness.   Etiology Inadequate energy/protein intake related to ETOH withdrawal.   Signs/Symptoms 1+ Edema (R) foot ; < 75 % of nutrition needs x 7 days.     Previous Goal: Pt will meet >75% energy & protein via TF; not applicable  NEW GOAL: Pt will consume > 75% meals/supplements; Maintain wt +/- 2#    Nutrition Diagnosis is [x] ongoing (physical signs of mild fat loss & mild/mod muscle wasting as noted above)  [ ] resolved [ ] not applicable     New Nutrition Diagnosis: [x] not applicable       Interventions:   Recommend  [x] Continue with Dysphagia 1 Pureed-Honey Thick diet  [ ] Change Diet To:  [x] Nutrition Supplement: Ensure Pudding 2x daily (340 kcals & 8 gm protein) + Magic Cup 1x daily (290 kcals & 9 gm protein)  [ ] Nutrition Support  [x] Other: Encourage po intake    Monitoring and Evaluation:   [ x ] PO intake [ x ] Tolerance to diet prescription [ x ] weights [ x ] labs[ x ] follow up per protocol  [ ] other: Assessment: Pt sen for follow-up. Pt with alcohol intoxication and withdrawal, s/p intubation 4/2 for acute hypoxemic respiratory failure likely due to aspiration; s/p extubation 4/4. Noted pt COVID negative x 2. Per swallow evaluation on 4/7, SLP recommended Dysphagia Pureed-Honey Thick Liquids.    Factors impacting intake: [ ] none [ ] nausea  [ ] vomiting [ ] diarrhea [ ] constipation  [ ]chewing problems [x] swallowing issues  [x] other: reduction in appetite    Diet Prescription: Diet, Dysphagia 1 Pureed-Honey Consistency Fluid (04-07-20 @ 14:13)    Intake: Po intake ~50% per nursing staff; Pt assisted with feeding    Current Weight: 69.8 kg (4/9), 70 kg (4/2)  % Weight Change: 0.3% (0.2 kg) wt loss x 1 week    Physical appearance: 2+ generalized edema; Nutrition focused physical exam conducted; Subcutaneous fat loss: [mild] Orbital fat pads region, [WNL]Buccal fat region, [mild]Triceps region,  [unable]Ribs region.  Muscle wasting: [moderate]Temples region, [mild]Clavicle region, [mild]Shoulder region, [unable]Scapula region, [mild]Interosseous region,  [mild/moderate]thigh region, [mild]Calf region    Pertinent Medications: MEDICATIONS  (STANDING):  atorvastatin 20 milliGRAM(s) Oral at bedtime  chlorhexidine 2% Cloths 1 Application(s) Topical <User Schedule>  chlorhexidine 4% Liquid 1 Application(s) Topical <User Schedule>  enoxaparin Injectable 40 milliGRAM(s) SubCutaneous daily  folic acid 1 milliGRAM(s) Oral daily  hydroxychloroquine   Oral   hydroxychloroquine 200 milliGRAM(s) Oral every 12 hours  meropenem  IVPB 1000 milliGRAM(s) IV Intermittent every 8 hours  metoprolol tartrate 25 milliGRAM(s) Oral two times a day  multivitamin 1 Tablet(s) Oral daily  pantoprazole   Suspension 40 milliGRAM(s) Oral daily  PHENobarbital Injectable 130 milliGRAM(s) IV Push every 6 hours  sucralfate suspension 1 Gram(s) Oral four times a day  thiamine 100 milliGRAM(s) Oral daily    MEDICATIONS  (PRN):  acetaminophen   Tablet .. 650 milliGRAM(s) Oral every 4 hours PRN Temp greater or equal to 38C (100.4F), Mild Pain (1 - 3)  acetaminophen  Suppository .. 650 milliGRAM(s) Rectal every 6 hours PRN Temp greater or equal to 38C (100.4F)  LORazepam   Injectable 2 milliGRAM(s) IV Push every 4 hours PRN Agitation  sodium chloride 0.9% lock flush 10 milliLiter(s) IV Push every 1 hour PRN Pre/post blood products, medications, blood draw, and to maintain line patency    Pertinent Labs: 04-09 Na145 mmol/L Glu 80 mg/dL K+ 3.8 mmol/L Cr  1.11 mg/dL BUN 17 mg/dL 04-08 Phos 2.8 mg/dL 04-08 Alb 2.2 g/dL<L>    CAPILLARY BLOOD GLUCOSE    Skin: WDL    Estimated Needs:   [x] no change since previous assessment on 4/3/20  [ ] recalculated:     Previous Nutrition Diagnosis:  Malnutrition Moderate Malnutrition in the context of Acute illness.   Etiology Inadequate energy/protein intake related to ETOH withdrawal.   Signs/Symptoms 1+ Edema (R) foot ; < 75 % of nutrition needs x 7 days.     Previous Goal: Pt will meet >75% energy & protein via TF; not applicable  NEW GOAL: Pt will consume > 75% meals/supplements; Maintain wt +/- 2#    Nutrition Diagnosis is [x] ongoing (physical signs of mild fat loss & mild/mod muscle wasting as noted above)  [ ] resolved [ ] not applicable     New Nutrition Diagnosis: [x] not applicable       Interventions:   Recommend  [x] Continue with Dysphagia 1 Pureed-Honey Thick diet  [ ] Change Diet To:  [x] Nutrition Supplement: Ensure Pudding 2x daily (340 kcals & 8 gm protein) + Magic Cup 1x daily (290 kcals & 9 gm protein)  [ ] Nutrition Support  [x] Other: Encourage po intake    Monitoring and Evaluation:   [ x ] PO intake [ x ] Tolerance to diet prescription [ x ] weights [ x ] labs[ x ] follow up per protocol  [ ] other: Assessment: Pt sen for follow-up. Pt with alcohol intoxication and withdrawal, s/p intubation 4/2 for acute hypoxemic respiratory failure due to aspiration; s/p extubation 4/4. Noted pt COVID negative x 2. Per swallow evaluation on 4/7, SLP recommended Dysphagia Pureed-Honey Thick Liquids.    Factors impacting intake: [ ] none [ ] nausea  [ ] vomiting [ ] diarrhea [ ] constipation  [ ]chewing problems [x] swallowing issues  [x] other: reduction in appetite    Diet Prescription: Diet, Dysphagia 1 Pureed-Honey Consistency Fluid (04-07-20 @ 14:13)    Intake: Po intake ~50% per nursing staff; Pt assisted with feeding    Current Weight: 69.8 kg (4/9), 70 kg (4/2)  % Weight Change: 0.3% (0.2 kg) wt loss x 1 week    Physical appearance: 2+ generalized edema; Nutrition focused physical exam conducted; Subcutaneous fat loss: [mild] Orbital fat pads region, [WNL]Buccal fat region, [mild]Triceps region,  [unable]Ribs region.  Muscle wasting: [moderate]Temples region, [mild]Clavicle region, [mild]Shoulder region, [unable]Scapula region, [mild]Interosseous region,  [mild/moderate]thigh region, [mild]Calf region    Pertinent Medications: MEDICATIONS  (STANDING):  atorvastatin 20 milliGRAM(s) Oral at bedtime  chlorhexidine 2% Cloths 1 Application(s) Topical <User Schedule>  chlorhexidine 4% Liquid 1 Application(s) Topical <User Schedule>  enoxaparin Injectable 40 milliGRAM(s) SubCutaneous daily  folic acid 1 milliGRAM(s) Oral daily  hydroxychloroquine   Oral   hydroxychloroquine 200 milliGRAM(s) Oral every 12 hours  meropenem  IVPB 1000 milliGRAM(s) IV Intermittent every 8 hours  metoprolol tartrate 25 milliGRAM(s) Oral two times a day  multivitamin 1 Tablet(s) Oral daily  pantoprazole   Suspension 40 milliGRAM(s) Oral daily  PHENobarbital Injectable 130 milliGRAM(s) IV Push every 6 hours  sucralfate suspension 1 Gram(s) Oral four times a day  thiamine 100 milliGRAM(s) Oral daily    MEDICATIONS  (PRN):  acetaminophen   Tablet .. 650 milliGRAM(s) Oral every 4 hours PRN Temp greater or equal to 38C (100.4F), Mild Pain (1 - 3)  acetaminophen  Suppository .. 650 milliGRAM(s) Rectal every 6 hours PRN Temp greater or equal to 38C (100.4F)  LORazepam   Injectable 2 milliGRAM(s) IV Push every 4 hours PRN Agitation  sodium chloride 0.9% lock flush 10 milliLiter(s) IV Push every 1 hour PRN Pre/post blood products, medications, blood draw, and to maintain line patency    Pertinent Labs: 04-09 Na145 mmol/L Glu 80 mg/dL K+ 3.8 mmol/L Cr  1.11 mg/dL BUN 17 mg/dL 04-08 Phos 2.8 mg/dL 04-08 Alb 2.2 g/dL<L>    CAPILLARY BLOOD GLUCOSE    Skin: WDL    Estimated Needs:   [x] no change since previous assessment on 4/3/20  [ ] recalculated:     Previous Nutrition Diagnosis:  Malnutrition Moderate Malnutrition in the context of Acute illness.   Etiology Inadequate energy/protein intake related to ETOH withdrawal.   Signs/Symptoms 1+ Edema (R) foot ; < 75 % of nutrition needs x 7 days.     Previous Goal: Pt will meet >75% energy & protein via TF; not applicable  NEW GOAL: Pt will consume > 75% meals/supplements; Maintain wt +/- 2#    Nutrition Diagnosis is [x] ongoing (physical signs of mild fat loss & mild/mod muscle wasting as noted above)  [ ] resolved [ ] not applicable     New Nutrition Diagnosis: [x] not applicable       Interventions:   Recommend  [x] Continue with Dysphagia 1 Pureed-Honey Thick diet  [ ] Change Diet To:  [x] Nutrition Supplement: Add Ensure Pudding 2x daily (340 kcals & 8 gm protein) + Magic Cup 1x daily (290 kcals & 9 gm protein)  [ ] Nutrition Support  [x] Other: Encourage po intake    Monitoring and Evaluation:   [ x ] PO intake [ x ] Tolerance to diet prescription [ x ] weights [ x ] labs[ x ] follow up per protocol  [ ] other:

## 2020-04-10 PROCEDURE — 99232 SBSQ HOSP IP/OBS MODERATE 35: CPT

## 2020-04-10 RX ADMIN — Medication 1 MILLIGRAM(S): at 13:29

## 2020-04-10 RX ADMIN — Medication 130 MILLIGRAM(S): at 06:08

## 2020-04-10 RX ADMIN — Medication 130 MILLIGRAM(S): at 19:11

## 2020-04-10 RX ADMIN — MEROPENEM 100 MILLIGRAM(S): 1 INJECTION INTRAVENOUS at 06:08

## 2020-04-10 RX ADMIN — ENOXAPARIN SODIUM 40 MILLIGRAM(S): 100 INJECTION SUBCUTANEOUS at 13:29

## 2020-04-10 RX ADMIN — Medication 2 MILLIGRAM(S): at 01:46

## 2020-04-10 RX ADMIN — Medication 2 MILLIGRAM(S): at 08:48

## 2020-04-10 RX ADMIN — Medication 1 GRAM(S): at 19:11

## 2020-04-10 RX ADMIN — MEROPENEM 100 MILLIGRAM(S): 1 INJECTION INTRAVENOUS at 23:28

## 2020-04-10 RX ADMIN — Medication 25 MILLIGRAM(S): at 19:10

## 2020-04-10 RX ADMIN — Medication 130 MILLIGRAM(S): at 13:29

## 2020-04-10 RX ADMIN — CHLORHEXIDINE GLUCONATE 1 APPLICATION(S): 213 SOLUTION TOPICAL at 08:08

## 2020-04-10 RX ADMIN — Medication 1 TABLET(S): at 13:29

## 2020-04-10 RX ADMIN — Medication 25 MILLIGRAM(S): at 08:08

## 2020-04-10 RX ADMIN — MEROPENEM 100 MILLIGRAM(S): 1 INJECTION INTRAVENOUS at 13:35

## 2020-04-10 RX ADMIN — Medication 1 GRAM(S): at 08:08

## 2020-04-10 RX ADMIN — Medication 200 MILLIGRAM(S): at 08:47

## 2020-04-10 RX ADMIN — Medication 100 MILLIGRAM(S): at 13:29

## 2020-04-10 RX ADMIN — Medication 1 GRAM(S): at 13:29

## 2020-04-10 RX ADMIN — Medication 130 MILLIGRAM(S): at 23:28

## 2020-04-10 RX ADMIN — ATORVASTATIN CALCIUM 20 MILLIGRAM(S): 80 TABLET, FILM COATED ORAL at 23:28

## 2020-04-10 RX ADMIN — Medication 1 GRAM(S): at 23:29

## 2020-04-10 RX ADMIN — CHLORHEXIDINE GLUCONATE 1 APPLICATION(S): 213 SOLUTION TOPICAL at 07:51

## 2020-04-10 RX ADMIN — PANTOPRAZOLE SODIUM 40 MILLIGRAM(S): 20 TABLET, DELAYED RELEASE ORAL at 08:08

## 2020-04-10 RX ADMIN — Medication 2 MILLIGRAM(S): at 13:52

## 2020-04-10 RX ADMIN — Medication 200 MILLIGRAM(S): at 23:28

## 2020-04-10 NOTE — PROGRESS NOTE ADULT - SUBJECTIVE AND OBJECTIVE BOX
Patient is a 54y old  Male who presents with a chief complaint of Abdominal Pain (10 Apr 2020 09:12)      INTERVAL HPI / OVERNIGHT EVENTS:    MEDICATIONS  (STANDING):  atorvastatin 20 milliGRAM(s) Oral at bedtime  chlorhexidine 2% Cloths 1 Application(s) Topical <User Schedule>  chlorhexidine 4% Liquid 1 Application(s) Topical <User Schedule>  enoxaparin Injectable 40 milliGRAM(s) SubCutaneous daily  folic acid 1 milliGRAM(s) Oral daily  hydroxychloroquine   Oral   hydroxychloroquine 200 milliGRAM(s) Oral every 12 hours  meropenem  IVPB 1000 milliGRAM(s) IV Intermittent every 8 hours  metoprolol tartrate 25 milliGRAM(s) Oral two times a day  multivitamin 1 Tablet(s) Oral daily  pantoprazole    Tablet 40 milliGRAM(s) Oral before breakfast  PHENobarbital Injectable 130 milliGRAM(s) IV Push every 6 hours  sucralfate suspension 1 Gram(s) Oral four times a day  thiamine 100 milliGRAM(s) Oral daily    MEDICATIONS  (PRN):  acetaminophen   Tablet .. 650 milliGRAM(s) Oral every 4 hours PRN Temp greater or equal to 38C (100.4F), Mild Pain (1 - 3)  acetaminophen  Suppository .. 650 milliGRAM(s) Rectal every 6 hours PRN Temp greater or equal to 38C (100.4F)  LORazepam   Injectable 2 milliGRAM(s) IV Push every 4 hours PRN Agitation  sodium chloride 0.9% lock flush 10 milliLiter(s) IV Push every 1 hour PRN Pre/post blood products, medications, blood draw, and to maintain line patency      Vital Signs Last 24 Hrs  T(C): 36.2 (10 Apr 2020 11:19), Max: 36.7 (10 Apr 2020 05:30)  T(F): 97.1 (10 Apr 2020 11:19), Max: 98 (10 Apr 2020 05:30)  HR: 75 (10 Apr 2020 13:26) (70 - 100)  BP: 152/93 (10 Apr 2020 13:26) (143/78 - 175/110)  BP(mean): --  RR: 18 (10 Apr 2020 13:26) (18 - 19)  SpO2: 100% (10 Apr 2020 13:26) (96% - 100%)    Review of systems:  General : no fever /chills,fatigue  CVS : no chest pain, palpitations  Lungs : no shortness of breath, cough  GI : no abdominal pain,vomiting, diarrhea   : no dysuria,hematuria        PHYSICAL EXAM:  General :NAD  Constitutional:  well-groomed, well-developed  Respiratory: CTAB/L  Cardiovascular: S1 and S2, RRR, no M/G/R  Gastrointestinal: BS+, soft, NT/ND  Extremities: No peripheral edema  Vascular: 2+ peripheral pulses  Skin: No rashes      LABS:                        10.5   5.80  )-----------( 657      ( 09 Apr 2020 07:34 )             33.0     04-09    145  |  113<H>  |  17  ----------------------------<  80  3.8   |  23  |  1.11    Ca    8.7      09 Apr 2020 07:34            MICROBIOLOGY:  RECENT CULTURES:  04-05 .Sputum Sputum XXXX   Few polymorphonuclear leukocytes per low power field  Few Squamous epithelial cells per low power field  Rare Gram Positive Cocci in Clusters per oil power field   Normal Respiratory Melanie present          RADIOLOGY & ADDITIONAL STUDIES: Patient is a 54y old  Male who presents with a chief complaint of Abdominal Pain (10 Apr 2020 09:12)      INTERVAL HPI / OVERNIGHT EVENTS:improving,awake,fever resolving    MEDICATIONS  (STANDING):  atorvastatin 20 milliGRAM(s) Oral at bedtime  chlorhexidine 2% Cloths 1 Application(s) Topical <User Schedule>  chlorhexidine 4% Liquid 1 Application(s) Topical <User Schedule>  enoxaparin Injectable 40 milliGRAM(s) SubCutaneous daily  folic acid 1 milliGRAM(s) Oral daily  hydroxychloroquine   Oral   hydroxychloroquine 200 milliGRAM(s) Oral every 12 hours  meropenem  IVPB 1000 milliGRAM(s) IV Intermittent every 8 hours  metoprolol tartrate 25 milliGRAM(s) Oral two times a day  multivitamin 1 Tablet(s) Oral daily  pantoprazole    Tablet 40 milliGRAM(s) Oral before breakfast  PHENobarbital Injectable 130 milliGRAM(s) IV Push every 6 hours  sucralfate suspension 1 Gram(s) Oral four times a day  thiamine 100 milliGRAM(s) Oral daily    MEDICATIONS  (PRN):  acetaminophen   Tablet .. 650 milliGRAM(s) Oral every 4 hours PRN Temp greater or equal to 38C (100.4F), Mild Pain (1 - 3)  acetaminophen  Suppository .. 650 milliGRAM(s) Rectal every 6 hours PRN Temp greater or equal to 38C (100.4F)  LORazepam   Injectable 2 milliGRAM(s) IV Push every 4 hours PRN Agitation  sodium chloride 0.9% lock flush 10 milliLiter(s) IV Push every 1 hour PRN Pre/post blood products, medications, blood draw, and to maintain line patency      Vital Signs Last 24 Hrs  T(C): 36.2 (10 Apr 2020 11:19), Max: 36.7 (10 Apr 2020 05:30)  T(F): 97.1 (10 Apr 2020 11:19), Max: 98 (10 Apr 2020 05:30)  HR: 75 (10 Apr 2020 13:26) (70 - 100)  BP: 152/93 (10 Apr 2020 13:26) (143/78 - 175/110)  BP(mean): --  RR: 18 (10 Apr 2020 13:26) (18 - 19)  SpO2: 100% (10 Apr 2020 13:26) (96% - 100%)    Review of systems:  General : no fever /chills,fatigue  CVS : no chest pain, palpitations  Lungs : no shortness of breath, cough  GI : no abdominal pain,vomiting, diarrhea   : no dysuria,hematuria        PHYSICAL EXAM:  General :NAD  Constitutional:  well-groomed, well-developed  Respiratory: CTAB/L  Cardiovascular: S1 and S2, RRR, no M/G/R  Gastrointestinal: BS+, soft, NT/ND  Extremities: No peripheral edema  Vascular: 2+ peripheral pulses  Skin: No rashes      LABS:                        10.5   5.80  )-----------( 657      ( 09 Apr 2020 07:34 )             33.0     04-09    145  |  113<H>  |  17  ----------------------------<  80  3.8   |  23  |  1.11    Ca    8.7      09 Apr 2020 07:34            MICROBIOLOGY:  RECENT CULTURES:  04-05 .Sputum Sputum XXXX   Few polymorphonuclear leukocytes per low power field  Few Squamous epithelial cells per low power field  Rare Gram Positive Cocci in Clusters per oil power field   Normal Respiratory Melanie present          RADIOLOGY & ADDITIONAL STUDIES:

## 2020-04-10 NOTE — PROGRESS NOTE ADULT - ASSESSMENT
54 yr old alcoholic male seen with   1.Aspiration pneumonia :   CT chest shows possible  COVID  pneumonia (more sp GGO ) as pt still has fever  cont meropenem to cover ESBL gram negative,day 5 ,will dc soon as sepsis resolved  sputum normal heidi only  s/p vanco x 1 dose and check MRSA PCR pending, never done but now fever resolving so wont add vanco   procalcitonin  sepsis resolved    2.Likely COVID pneumonia : based on CT chest finding    cont plaqaunil day 4 of 5 days total  cont isolation   trend markers      4.ROSA :sec to above/drugs  resolved  resolved    5.acute metabolic encephalopathy : resolving

## 2020-04-10 NOTE — PROGRESS NOTE ADULT - SUBJECTIVE AND OBJECTIVE BOX
INTERVAL HPI/OVERNIGHT EVENTS:  Pt seen and examined at bedside.     Allergies/Intolerance: No Known Allergies      MEDICATIONS  (STANDING):  atorvastatin 20 milliGRAM(s) Oral at bedtime  chlorhexidine 2% Cloths 1 Application(s) Topical <User Schedule>  chlorhexidine 4% Liquid 1 Application(s) Topical <User Schedule>  enoxaparin Injectable 40 milliGRAM(s) SubCutaneous daily  folic acid 1 milliGRAM(s) Oral daily  hydroxychloroquine   Oral   hydroxychloroquine 200 milliGRAM(s) Oral every 12 hours  meropenem  IVPB 1000 milliGRAM(s) IV Intermittent every 8 hours  metoprolol tartrate 25 milliGRAM(s) Oral two times a day  multivitamin 1 Tablet(s) Oral daily  pantoprazole    Tablet 40 milliGRAM(s) Oral before breakfast  PHENobarbital Injectable 130 milliGRAM(s) IV Push every 6 hours  sucralfate suspension 1 Gram(s) Oral four times a day  thiamine 100 milliGRAM(s) Oral daily    MEDICATIONS  (PRN):  acetaminophen   Tablet .. 650 milliGRAM(s) Oral every 4 hours PRN Temp greater or equal to 38C (100.4F), Mild Pain (1 - 3)  acetaminophen  Suppository .. 650 milliGRAM(s) Rectal every 6 hours PRN Temp greater or equal to 38C (100.4F)  LORazepam   Injectable 2 milliGRAM(s) IV Push every 4 hours PRN Agitation  sodium chloride 0.9% lock flush 10 milliLiter(s) IV Push every 1 hour PRN Pre/post blood products, medications, blood draw, and to maintain line patency        ROS: all systems reviewed and wnl      PHYSICAL EXAMINATION:  Vital Signs Last 24 Hrs  T(C): 36.7 (10 Apr 2020 05:30), Max: 36.7 (10 Apr 2020 05:30)  T(F): 98 (10 Apr 2020 05:30), Max: 98 (10 Apr 2020 05:30)  HR: 72 (10 Apr 2020 08:45) (70 - 100)  BP: 154/95 (10 Apr 2020 08:45) (118/70 - 175/110)  BP(mean): --  RR: 18 (10 Apr 2020 08:45) (17 - 19)  SpO2: 98% (10 Apr 2020 08:45) (96% - 100%)  CAPILLARY BLOOD GLUCOSE          04-09 @ 07:01  -  04-10 @ 07:00  --------------------------------------------------------  IN: 290 mL / OUT: 100 mL / NET: 190 mL        GENERAL:   NECK: supple, No JVD  CHEST/LUNG: clear to auscultation bilaterally; no rales, rhonchi, or wheezing b/l  HEART: normal S1, S2  ABDOMEN: BS+, soft, ND, NT   EXTREMITIES:  pulses palpable; no clubbing, cyanosis, or edema b/l LEs  SKIN: no rashes or lesions      LABS:                        10.5   5.80  )-----------( 657      ( 09 Apr 2020 07:34 )             33.0     04-09    145  |  113<H>  |  17  ----------------------------<  80  3.8   |  23  |  1.11    Ca    8.7      09 Apr 2020 07:34  Phos  2.8     04-08  Mg     2.4     04-08    TPro  8.6<H>  /  Alb  2.2<L>  /  TBili  0.4  /  DBili  x   /  AST  99<H>  /  ALT  41  /  AlkPhos  52  04-08 INTERVAL HPI/OVERNIGHT EVENTS:  Pt seen and examined at bedside.     Allergies/Intolerance: No Known Allergies      MEDICATIONS  (STANDING):  atorvastatin 20 milliGRAM(s) Oral at bedtime  chlorhexidine 2% Cloths 1 Application(s) Topical <User Schedule>  chlorhexidine 4% Liquid 1 Application(s) Topical <User Schedule>  enoxaparin Injectable 40 milliGRAM(s) SubCutaneous daily  folic acid 1 milliGRAM(s) Oral daily  hydroxychloroquine   Oral   hydroxychloroquine 200 milliGRAM(s) Oral every 12 hours  meropenem  IVPB 1000 milliGRAM(s) IV Intermittent every 8 hours  metoprolol tartrate 25 milliGRAM(s) Oral two times a day  multivitamin 1 Tablet(s) Oral daily  pantoprazole    Tablet 40 milliGRAM(s) Oral before breakfast  PHENobarbital Injectable 130 milliGRAM(s) IV Push every 6 hours  sucralfate suspension 1 Gram(s) Oral four times a day  thiamine 100 milliGRAM(s) Oral daily    MEDICATIONS  (PRN):  acetaminophen   Tablet .. 650 milliGRAM(s) Oral every 4 hours PRN Temp greater or equal to 38C (100.4F), Mild Pain (1 - 3)  acetaminophen  Suppository .. 650 milliGRAM(s) Rectal every 6 hours PRN Temp greater or equal to 38C (100.4F)  LORazepam   Injectable 2 milliGRAM(s) IV Push every 4 hours PRN Agitation  sodium chloride 0.9% lock flush 10 milliLiter(s) IV Push every 1 hour PRN Pre/post blood products, medications, blood draw, and to maintain line patency        ROS: all systems reviewed and wnl      PHYSICAL EXAMINATION:  Vital Signs Last 24 Hrs  T(C): 36.7 (10 Apr 2020 05:30), Max: 36.7 (10 Apr 2020 05:30)  T(F): 98 (10 Apr 2020 05:30), Max: 98 (10 Apr 2020 05:30)  HR: 72 (10 Apr 2020 08:45) (70 - 100)  BP: 154/95 (10 Apr 2020 08:45) (118/70 - 175/110)  BP(mean): --  RR: 18 (10 Apr 2020 08:45) (17 - 19)  SpO2: 98% (10 Apr 2020 08:45) (96% - 100%)  CAPILLARY BLOOD GLUCOSE          04-09 @ 07:01  -  04-10 @ 07:00  --------------------------------------------------------  IN: 290 mL / OUT: 100 mL / NET: 190 mL        GENERAL: in bed, alert, saxena removed, NAD, agitation controlled  NECK: supple, No JVD  CHEST/LUNG: clear to auscultation bilaterally; no rales, rhonchi, or wheezing b/l  HEART: normal S1, S2  ABDOMEN: BS+, soft, ND, NT   EXTREMITIES:  pulses palpable; no clubbing, cyanosis, or edema b/l LEs  SKIN: no rashes or lesions      LABS:                        10.5   5.80  )-----------( 657      ( 09 Apr 2020 07:34 )             33.0     04-09    145  |  113<H>  |  17  ----------------------------<  80  3.8   |  23  |  1.11    Ca    8.7      09 Apr 2020 07:34  Phos  2.8     04-08  Mg     2.4     04-08    TPro  8.6<H>  /  Alb  2.2<L>  /  TBili  0.4  /  DBili  x   /  AST  99<H>  /  ALT  41  /  AlkPhos  52  04-08

## 2020-04-11 PROCEDURE — 99232 SBSQ HOSP IP/OBS MODERATE 35: CPT

## 2020-04-11 RX ORDER — METOPROLOL TARTRATE 50 MG
25 TABLET ORAL THREE TIMES A DAY
Refills: 0 | Status: DISCONTINUED | OUTPATIENT
Start: 2020-04-11 | End: 2020-04-14

## 2020-04-11 RX ADMIN — Medication 1 GRAM(S): at 07:43

## 2020-04-11 RX ADMIN — Medication 1 MILLIGRAM(S): at 11:37

## 2020-04-11 RX ADMIN — Medication 200 MILLIGRAM(S): at 07:42

## 2020-04-11 RX ADMIN — Medication 1 GRAM(S): at 11:38

## 2020-04-11 RX ADMIN — Medication 100 MILLIGRAM(S): at 11:38

## 2020-04-11 RX ADMIN — Medication 25 MILLIGRAM(S): at 15:06

## 2020-04-11 RX ADMIN — Medication 25 MILLIGRAM(S): at 07:43

## 2020-04-11 RX ADMIN — Medication 130 MILLIGRAM(S): at 11:37

## 2020-04-11 RX ADMIN — ENOXAPARIN SODIUM 40 MILLIGRAM(S): 100 INJECTION SUBCUTANEOUS at 11:37

## 2020-04-11 RX ADMIN — MEROPENEM 100 MILLIGRAM(S): 1 INJECTION INTRAVENOUS at 07:42

## 2020-04-11 RX ADMIN — Medication 130 MILLIGRAM(S): at 23:14

## 2020-04-11 RX ADMIN — MEROPENEM 100 MILLIGRAM(S): 1 INJECTION INTRAVENOUS at 14:54

## 2020-04-11 RX ADMIN — CHLORHEXIDINE GLUCONATE 1 APPLICATION(S): 213 SOLUTION TOPICAL at 07:42

## 2020-04-11 RX ADMIN — ATORVASTATIN CALCIUM 20 MILLIGRAM(S): 80 TABLET, FILM COATED ORAL at 21:53

## 2020-04-11 RX ADMIN — Medication 1 GRAM(S): at 23:14

## 2020-04-11 RX ADMIN — Medication 130 MILLIGRAM(S): at 07:43

## 2020-04-11 RX ADMIN — Medication 25 MILLIGRAM(S): at 21:52

## 2020-04-11 RX ADMIN — Medication 200 MILLIGRAM(S): at 21:53

## 2020-04-11 RX ADMIN — Medication 1 TABLET(S): at 11:37

## 2020-04-11 RX ADMIN — PANTOPRAZOLE SODIUM 40 MILLIGRAM(S): 20 TABLET, DELAYED RELEASE ORAL at 07:43

## 2020-04-11 RX ADMIN — Medication 130 MILLIGRAM(S): at 16:53

## 2020-04-11 RX ADMIN — Medication 1 GRAM(S): at 16:53

## 2020-04-11 NOTE — PROGRESS NOTE ADULT - ASSESSMENT
54M w/ ETtOH abuse w/ DTs. ADmitted w/ alcohol intoxication and subsequent withdrawal. Intubated for acute hypoxemic respiratory failure likely secondary to aspiration,  extubated. Also had septic shock which has now resolved and ROSA.     ETOH withdrawel   - phenobarb 130 mg IVP every 6 hours, CIWA monitoring. Stop IV Pheno 4/13/20.     fever   - 2/2 gram negative PNA . COVID-19 negative x 2; procal elevated. ID has placed on Meropenum 1,000 every 8 hours as fevers persist for presumed aspiration pneumonia. Treatment started for COVID with CT findings, Plaquenil for 5 total days. CT chest shows lower lobe pneumonia consistent with COVID. MVI/Thiamine/Folate. Doing well on 2L NC. attempt to ween tomorrow.  Completed TOV.        Discharge home Monday after completes Meropenem and IV Pheno nieves schedule. 54M w/ ETtOH abuse w/ DTs. ADmitted w/ alcohol intoxication and subsequent withdrawal. Intubated for acute hypoxemic respiratory failure likely secondary to aspiration,  extubated. Also had septic shock which has now resolved and ROSA.     ETOH withdrawel   - phenobarb 130 mg IVP every 6 hours, CIWA monitoring. Stop IV Pheno 4/13/20.     fever   - 2/2 gram negative PNA . COVID-19 negative x 2; procal elevated. ID has placed on Meropenum 1,000 every 8 hours as fevers persist for presumed aspiration pneumonia. Treatment started for COVID with CT findings, Plaquenil for 5 total days. CT chest shows lower lobe pneumonia consistent with COVID. MVI/Thiamine/Folate. Doing well on 2L NC. attempt to ween tomorrow.  Completed TOV, successful. Will stop Meropemen, appears mostly COVID   infiltrates lower lobe on CXR.        Discharge home Monday after completes IV Pheno nieves schedule. Trial soft diet. Mental status more alert, daily.

## 2020-04-11 NOTE — PROGRESS NOTE ADULT - SUBJECTIVE AND OBJECTIVE BOX
INTERVAL HPI/OVERNIGHT EVENTS:  Pt seen and examined at bedside.     Allergies/Intolerance: No Known Allergies      MEDICATIONS  (STANDING):  atorvastatin 20 milliGRAM(s) Oral at bedtime  chlorhexidine 2% Cloths 1 Application(s) Topical <User Schedule>  chlorhexidine 4% Liquid 1 Application(s) Topical <User Schedule>  enoxaparin Injectable 40 milliGRAM(s) SubCutaneous daily  folic acid 1 milliGRAM(s) Oral daily  hydroxychloroquine   Oral   hydroxychloroquine 200 milliGRAM(s) Oral every 12 hours  meropenem  IVPB 1000 milliGRAM(s) IV Intermittent every 8 hours  metoprolol tartrate 25 milliGRAM(s) Oral two times a day  multivitamin 1 Tablet(s) Oral daily  pantoprazole    Tablet 40 milliGRAM(s) Oral before breakfast  PHENobarbital Injectable 130 milliGRAM(s) IV Push every 6 hours  sucralfate suspension 1 Gram(s) Oral four times a day  thiamine 100 milliGRAM(s) Oral daily    MEDICATIONS  (PRN):  acetaminophen   Tablet .. 650 milliGRAM(s) Oral every 4 hours PRN Temp greater or equal to 38C (100.4F), Mild Pain (1 - 3)  acetaminophen  Suppository .. 650 milliGRAM(s) Rectal every 6 hours PRN Temp greater or equal to 38C (100.4F)  LORazepam   Injectable 2 milliGRAM(s) IV Push every 4 hours PRN Agitation  sodium chloride 0.9% lock flush 10 milliLiter(s) IV Push every 1 hour PRN Pre/post blood products, medications, blood draw, and to maintain line patency        ROS: all systems reviewed and wnl      PHYSICAL EXAMINATION:  Vital Signs Last 24 Hrs  T(C): 36.6 (11 Apr 2020 05:24), Max: 37.7 (10 Apr 2020 17:35)  T(F): 97.8 (11 Apr 2020 05:24), Max: 99.8 (10 Apr 2020 17:35)  HR: 74 (11 Apr 2020 05:24) (73 - 99)  BP: 128/77 (11 Apr 2020 05:24) (123/79 - 152/93)  BP(mean): --  RR: 17 (11 Apr 2020 05:24) (17 - 18)  SpO2: 100% (11 Apr 2020 05:24) (98% - 100%)  CAPILLARY BLOOD GLUCOSE            GENERAL:   NECK: supple, No JVD  CHEST/LUNG: clear to auscultation bilaterally; no rales, rhonchi, or wheezing b/l  HEART: normal S1, S2  ABDOMEN: BS+, soft, ND, NT   EXTREMITIES:  pulses palpable; no clubbing, cyanosis, or edema b/l LEs  SKIN: no rashes or lesions      LABS: INTERVAL HPI/OVERNIGHT EVENTS:  Pt seen and examined at bedside.     Allergies/Intolerance: No Known Allergies      MEDICATIONS  (STANDING):  atorvastatin 20 milliGRAM(s) Oral at bedtime  chlorhexidine 2% Cloths 1 Application(s) Topical <User Schedule>  chlorhexidine 4% Liquid 1 Application(s) Topical <User Schedule>  enoxaparin Injectable 40 milliGRAM(s) SubCutaneous daily  folic acid 1 milliGRAM(s) Oral daily  hydroxychloroquine   Oral   hydroxychloroquine 200 milliGRAM(s) Oral every 12 hours  meropenem  IVPB 1000 milliGRAM(s) IV Intermittent every 8 hours  metoprolol tartrate 25 milliGRAM(s) Oral two times a day  multivitamin 1 Tablet(s) Oral daily  pantoprazole    Tablet 40 milliGRAM(s) Oral before breakfast  PHENobarbital Injectable 130 milliGRAM(s) IV Push every 6 hours  sucralfate suspension 1 Gram(s) Oral four times a day  thiamine 100 milliGRAM(s) Oral daily    MEDICATIONS  (PRN):  acetaminophen   Tablet .. 650 milliGRAM(s) Oral every 4 hours PRN Temp greater or equal to 38C (100.4F), Mild Pain (1 - 3)  acetaminophen  Suppository .. 650 milliGRAM(s) Rectal every 6 hours PRN Temp greater or equal to 38C (100.4F)  LORazepam   Injectable 2 milliGRAM(s) IV Push every 4 hours PRN Agitation  sodium chloride 0.9% lock flush 10 milliLiter(s) IV Push every 1 hour PRN Pre/post blood products, medications, blood draw, and to maintain line patency        ROS: all systems reviewed and wnl      PHYSICAL EXAMINATION:  Vital Signs Last 24 Hrs  T(C): 36.6 (11 Apr 2020 05:24), Max: 37.7 (10 Apr 2020 17:35)  T(F): 97.8 (11 Apr 2020 05:24), Max: 99.8 (10 Apr 2020 17:35)  HR: 74 (11 Apr 2020 05:24) (73 - 99)  BP: 128/77 (11 Apr 2020 05:24) (123/79 - 152/93)  BP(mean): --  RR: 17 (11 Apr 2020 05:24) (17 - 18)  SpO2: 100% (11 Apr 2020 05:24) (98% - 100%)  CAPILLARY BLOOD GLUCOSE            GENERAL: stable, saxena removed, no fevers, SOB or cough  NECK: supple, No JVD  CHEST/LUNG: clear to auscultation bilaterally; no rales, rhonchi, or wheezing b/l  HEART: normal S1, S2  ABDOMEN: BS+, soft, ND, NT   EXTREMITIES:  pulses palpable; no clubbing, cyanosis, or edema b/l LEs  SKIN: no rashes or lesions      LABS:

## 2020-04-12 PROCEDURE — 99232 SBSQ HOSP IP/OBS MODERATE 35: CPT

## 2020-04-12 RX ADMIN — Medication 1 GRAM(S): at 17:41

## 2020-04-12 RX ADMIN — Medication 130 MILLIGRAM(S): at 05:42

## 2020-04-12 RX ADMIN — ATORVASTATIN CALCIUM 20 MILLIGRAM(S): 80 TABLET, FILM COATED ORAL at 22:17

## 2020-04-12 RX ADMIN — Medication 2 MILLIGRAM(S): at 20:02

## 2020-04-12 RX ADMIN — Medication 200 MILLIGRAM(S): at 22:16

## 2020-04-12 RX ADMIN — CHLORHEXIDINE GLUCONATE 1 APPLICATION(S): 213 SOLUTION TOPICAL at 05:33

## 2020-04-12 RX ADMIN — Medication 1 MILLIGRAM(S): at 11:50

## 2020-04-12 RX ADMIN — Medication 1 TABLET(S): at 11:49

## 2020-04-12 RX ADMIN — CHLORHEXIDINE GLUCONATE 1 APPLICATION(S): 213 SOLUTION TOPICAL at 07:40

## 2020-04-12 RX ADMIN — Medication 200 MILLIGRAM(S): at 09:26

## 2020-04-12 RX ADMIN — Medication 1 GRAM(S): at 11:49

## 2020-04-12 RX ADMIN — Medication 130 MILLIGRAM(S): at 12:12

## 2020-04-12 RX ADMIN — ENOXAPARIN SODIUM 40 MILLIGRAM(S): 100 INJECTION SUBCUTANEOUS at 11:50

## 2020-04-12 RX ADMIN — Medication 25 MILLIGRAM(S): at 22:17

## 2020-04-12 RX ADMIN — Medication 130 MILLIGRAM(S): at 17:41

## 2020-04-12 RX ADMIN — Medication 1 GRAM(S): at 05:43

## 2020-04-12 RX ADMIN — Medication 100 MILLIGRAM(S): at 11:49

## 2020-04-12 RX ADMIN — Medication 25 MILLIGRAM(S): at 16:02

## 2020-04-12 RX ADMIN — Medication 25 MILLIGRAM(S): at 05:42

## 2020-04-12 RX ADMIN — Medication 2 MILLIGRAM(S): at 07:40

## 2020-04-12 RX ADMIN — PANTOPRAZOLE SODIUM 40 MILLIGRAM(S): 20 TABLET, DELAYED RELEASE ORAL at 05:42

## 2020-04-12 NOTE — PROGRESS NOTE ADULT - ASSESSMENT
54M w/ ETtOH abuse w/ DTs. ADmitted w/ alcohol intoxication and subsequent withdrawal. Intubated for acute hypoxemic respiratory failure likely secondary to aspiration,  extubated. Also had septic shock which has now resolved and ROSA.     ETOH withdrawel   - phenobarb 130 mg IVP every 6 hours, CIWA monitoring. Stop IV Pheno 4/13/20.     fever   - 2/2 gram negative PNA . COVID-19 negative x 2; procal elevated. ID has placed on Meropenum 1,000 every 8 hours as fevers persist for presumed aspiration pneumonia. Treatment started for COVID with CT findings, Plaquenil for 5 total days. CT chest shows lower lobe pneumonia consistent with COVID. MVI/Thiamine/Folate. Doing well on 2L NC. attempt to ween tomorrow.  Completed TOV, successful. Will stop Meropemen, appears mostly COVID   infiltrates lower lobe on CXR.        Discharge home Monday after completes IV Pheno nieves schedule. Trial soft diet. Mental status more alert, daily. 54M w/ ETtOH abuse w/ DTs. ADmitted w/ alcohol intoxication and subsequent withdrawal. Intubated for acute hypoxemic respiratory failure likely secondary to aspiration,  extubated. Also had septic shock which has now resolved and ROSA.     ETOH withdrawel   - phenobarb 130 mg IVP every 6 hours, CIWA monitoring. Stop IV Pheno 4/13/20.     fever   - 2/2 gram negative PNA . COVID-19 negative x 2; procal elevated. ID has placed on Meropenum 1,000 every 8 hours as fevers persist for presumed aspiration pneumonia. Treatment started for COVID with CT findings, Plaquenil for 5 total days. CT chest shows lower lobe pneumonia consistent with COVID. MVI/Thiamine/Folate. Doing well on 2L NC. attempt to ween tomorrow.  Completed TOV, successful. Will stop Meropemen, appears mostly COVID   infiltrates lower lobe on CXR.    Finish COVID treatment Monday, discharge home Monday.     Discharge home Monday after completes IV Pheno nieves schedule. Trial soft diet. Mental status more alert, daily.

## 2020-04-12 NOTE — PROGRESS NOTE ADULT - SUBJECTIVE AND OBJECTIVE BOX
INTERVAL HPI/OVERNIGHT EVENTS:  Pt seen and examined at bedside.     Allergies/Intolerance: No Known Allergies      MEDICATIONS  (STANDING):  atorvastatin 20 milliGRAM(s) Oral at bedtime  chlorhexidine 2% Cloths 1 Application(s) Topical <User Schedule>  chlorhexidine 4% Liquid 1 Application(s) Topical <User Schedule>  enoxaparin Injectable 40 milliGRAM(s) SubCutaneous daily  folic acid 1 milliGRAM(s) Oral daily  hydroxychloroquine   Oral   hydroxychloroquine 200 milliGRAM(s) Oral every 12 hours  metoprolol tartrate 25 milliGRAM(s) Oral three times a day  multivitamin 1 Tablet(s) Oral daily  pantoprazole    Tablet 40 milliGRAM(s) Oral before breakfast  PHENobarbital Injectable 130 milliGRAM(s) IV Push every 6 hours  sucralfate suspension 1 Gram(s) Oral four times a day  thiamine 100 milliGRAM(s) Oral daily    MEDICATIONS  (PRN):  acetaminophen   Tablet .. 650 milliGRAM(s) Oral every 4 hours PRN Temp greater or equal to 38C (100.4F), Mild Pain (1 - 3)  acetaminophen  Suppository .. 650 milliGRAM(s) Rectal every 6 hours PRN Temp greater or equal to 38C (100.4F)  LORazepam   Injectable 2 milliGRAM(s) IV Push every 4 hours PRN Agitation  sodium chloride 0.9% lock flush 10 milliLiter(s) IV Push every 1 hour PRN Pre/post blood products, medications, blood draw, and to maintain line patency        ROS: all systems reviewed and wnl      PHYSICAL EXAMINATION:  Vital Signs Last 24 Hrs  T(C): 37 (12 Apr 2020 05:54), Max: 37.7 (12 Apr 2020 00:47)  T(F): 98.6 (12 Apr 2020 05:54), Max: 99.8 (12 Apr 2020 00:47)  HR: 64 (12 Apr 2020 05:54) (64 - 109)  BP: 144/80 (12 Apr 2020 05:54) (118/75 - 144/80)  BP(mean): --  RR: 18 (12 Apr 2020 05:54) (18 - 18)  SpO2: 100% (12 Apr 2020 05:54) (96% - 100%)  CAPILLARY BLOOD GLUCOSE          04-11 @ 07:01  -  04-12 @ 07:00  --------------------------------------------------------  IN: 720 mL / OUT: 0 mL / NET: 720 mL        GENERAL:   NECK: supple, No JVD  CHEST/LUNG: clear to auscultation bilaterally; no rales, rhonchi, or wheezing b/l  HEART: normal S1, S2  ABDOMEN: BS+, soft, ND, NT   EXTREMITIES:  pulses palpable; no clubbing, cyanosis, or edema b/l LEs  SKIN: no rashes or lesions      LABS: INTERVAL HPI/OVERNIGHT EVENTS:  Pt seen and examined at bedside.     Allergies/Intolerance: No Known Allergies      MEDICATIONS  (STANDING):  atorvastatin 20 milliGRAM(s) Oral at bedtime  chlorhexidine 2% Cloths 1 Application(s) Topical <User Schedule>  chlorhexidine 4% Liquid 1 Application(s) Topical <User Schedule>  enoxaparin Injectable 40 milliGRAM(s) SubCutaneous daily  folic acid 1 milliGRAM(s) Oral daily  hydroxychloroquine   Oral   hydroxychloroquine 200 milliGRAM(s) Oral every 12 hours  metoprolol tartrate 25 milliGRAM(s) Oral three times a day  multivitamin 1 Tablet(s) Oral daily  pantoprazole    Tablet 40 milliGRAM(s) Oral before breakfast  PHENobarbital Injectable 130 milliGRAM(s) IV Push every 6 hours  sucralfate suspension 1 Gram(s) Oral four times a day  thiamine 100 milliGRAM(s) Oral daily    MEDICATIONS  (PRN):  acetaminophen   Tablet .. 650 milliGRAM(s) Oral every 4 hours PRN Temp greater or equal to 38C (100.4F), Mild Pain (1 - 3)  acetaminophen  Suppository .. 650 milliGRAM(s) Rectal every 6 hours PRN Temp greater or equal to 38C (100.4F)  LORazepam   Injectable 2 milliGRAM(s) IV Push every 4 hours PRN Agitation  sodium chloride 0.9% lock flush 10 milliLiter(s) IV Push every 1 hour PRN Pre/post blood products, medications, blood draw, and to maintain line patency        ROS: all systems reviewed and wnl      PHYSICAL EXAMINATION:  Vital Signs Last 24 Hrs  T(C): 37 (12 Apr 2020 05:54), Max: 37.7 (12 Apr 2020 00:47)  T(F): 98.6 (12 Apr 2020 05:54), Max: 99.8 (12 Apr 2020 00:47)  HR: 64 (12 Apr 2020 05:54) (64 - 109)  BP: 144/80 (12 Apr 2020 05:54) (118/75 - 144/80)  BP(mean): --  RR: 18 (12 Apr 2020 05:54) (18 - 18)  SpO2: 100% (12 Apr 2020 05:54) (96% - 100%)  CAPILLARY BLOOD GLUCOSE          04-11 @ 07:01  -  04-12 @ 07:00  --------------------------------------------------------  IN: 720 mL / OUT: 0 mL / NET: 720 mL        GENERAL: asleep in bed, NAD, comfortable, saxena removed  NECK: supple, No JVD  CHEST/LUNG: clear to auscultation bilaterally; no rales, rhonchi, or wheezing b/l  HEART: normal S1, S2  ABDOMEN: BS+, soft, ND, NT   EXTREMITIES:  pulses palpable; no clubbing, cyanosis, or edema b/l LEs  SKIN: no rashes or lesions      LABS:

## 2020-04-13 PROCEDURE — 99232 SBSQ HOSP IP/OBS MODERATE 35: CPT

## 2020-04-13 RX ADMIN — CHLORHEXIDINE GLUCONATE 1 APPLICATION(S): 213 SOLUTION TOPICAL at 07:05

## 2020-04-13 RX ADMIN — Medication 200 MILLIGRAM(S): at 07:09

## 2020-04-13 RX ADMIN — ENOXAPARIN SODIUM 40 MILLIGRAM(S): 100 INJECTION SUBCUTANEOUS at 11:38

## 2020-04-13 RX ADMIN — Medication 25 MILLIGRAM(S): at 22:35

## 2020-04-13 RX ADMIN — Medication 1 TABLET(S): at 11:38

## 2020-04-13 RX ADMIN — Medication 25 MILLIGRAM(S): at 07:10

## 2020-04-13 RX ADMIN — CHLORHEXIDINE GLUCONATE 1 APPLICATION(S): 213 SOLUTION TOPICAL at 07:04

## 2020-04-13 RX ADMIN — Medication 100 MILLIGRAM(S): at 11:38

## 2020-04-13 RX ADMIN — Medication 1 GRAM(S): at 11:38

## 2020-04-13 RX ADMIN — Medication 1 GRAM(S): at 00:40

## 2020-04-13 RX ADMIN — Medication 130 MILLIGRAM(S): at 00:40

## 2020-04-13 RX ADMIN — PANTOPRAZOLE SODIUM 40 MILLIGRAM(S): 20 TABLET, DELAYED RELEASE ORAL at 07:10

## 2020-04-13 RX ADMIN — Medication 1 MILLIGRAM(S): at 11:38

## 2020-04-13 RX ADMIN — Medication 130 MILLIGRAM(S): at 07:10

## 2020-04-13 RX ADMIN — Medication 1 GRAM(S): at 07:10

## 2020-04-13 NOTE — PROGRESS NOTE ADULT - SUBJECTIVE AND OBJECTIVE BOX
INTERVAL HPI/OVERNIGHT EVENTS:  Pt seen and examined at bedside.     Allergies/Intolerance: No Known Allergies      MEDICATIONS  (STANDING):  atorvastatin 20 milliGRAM(s) Oral at bedtime  chlorhexidine 2% Cloths 1 Application(s) Topical <User Schedule>  chlorhexidine 4% Liquid 1 Application(s) Topical <User Schedule>  enoxaparin Injectable 40 milliGRAM(s) SubCutaneous daily  folic acid 1 milliGRAM(s) Oral daily  metoprolol tartrate 25 milliGRAM(s) Oral three times a day  multivitamin 1 Tablet(s) Oral daily  pantoprazole    Tablet 40 milliGRAM(s) Oral before breakfast  PHENobarbital Injectable 130 milliGRAM(s) IV Push every 6 hours  sucralfate suspension 1 Gram(s) Oral four times a day  thiamine 100 milliGRAM(s) Oral daily    MEDICATIONS  (PRN):  acetaminophen   Tablet .. 650 milliGRAM(s) Oral every 4 hours PRN Temp greater or equal to 38C (100.4F), Mild Pain (1 - 3)  acetaminophen  Suppository .. 650 milliGRAM(s) Rectal every 6 hours PRN Temp greater or equal to 38C (100.4F)  LORazepam   Injectable 2 milliGRAM(s) IV Push every 4 hours PRN Agitation  sodium chloride 0.9% lock flush 10 milliLiter(s) IV Push every 1 hour PRN Pre/post blood products, medications, blood draw, and to maintain line patency        ROS: all systems reviewed and wnl      PHYSICAL EXAMINATION:  Vital Signs Last 24 Hrs  T(C): 36.7 (13 Apr 2020 04:40), Max: 36.8 (12 Apr 2020 11:02)  T(F): 98.1 (13 Apr 2020 04:40), Max: 98.3 (12 Apr 2020 11:02)  HR: 78 (13 Apr 2020 04:40) (78 - 99)  BP: 158/95 (13 Apr 2020 04:40) (123/73 - 158/95)  BP(mean): --  RR: 19 (13 Apr 2020 04:40) (18 - 19)  SpO2: 96% (13 Apr 2020 04:40) (90% - 97%)  CAPILLARY BLOOD GLUCOSE            GENERAL:   NECK: supple, No JVD  CHEST/LUNG: clear to auscultation bilaterally; no rales, rhonchi, or wheezing b/l  HEART: normal S1, S2  ABDOMEN: BS+, soft, ND, NT   EXTREMITIES:  pulses palpable; no clubbing, cyanosis, or edema b/l LEs  SKIN: no rashes or lesions      LABS: INTERVAL HPI/OVERNIGHT EVENTS:  Pt seen and examined at bedside.     Allergies/Intolerance: No Known Allergies      MEDICATIONS  (STANDING):  atorvastatin 20 milliGRAM(s) Oral at bedtime  chlorhexidine 2% Cloths 1 Application(s) Topical <User Schedule>  chlorhexidine 4% Liquid 1 Application(s) Topical <User Schedule>  enoxaparin Injectable 40 milliGRAM(s) SubCutaneous daily  folic acid 1 milliGRAM(s) Oral daily  metoprolol tartrate 25 milliGRAM(s) Oral three times a day  multivitamin 1 Tablet(s) Oral daily  pantoprazole    Tablet 40 milliGRAM(s) Oral before breakfast  PHENobarbital Injectable 130 milliGRAM(s) IV Push every 6 hours  sucralfate suspension 1 Gram(s) Oral four times a day  thiamine 100 milliGRAM(s) Oral daily    MEDICATIONS  (PRN):  acetaminophen   Tablet .. 650 milliGRAM(s) Oral every 4 hours PRN Temp greater or equal to 38C (100.4F), Mild Pain (1 - 3)  acetaminophen  Suppository .. 650 milliGRAM(s) Rectal every 6 hours PRN Temp greater or equal to 38C (100.4F)  LORazepam   Injectable 2 milliGRAM(s) IV Push every 4 hours PRN Agitation  sodium chloride 0.9% lock flush 10 milliLiter(s) IV Push every 1 hour PRN Pre/post blood products, medications, blood draw, and to maintain line patency        ROS: all systems reviewed and wnl      PHYSICAL EXAMINATION:  Vital Signs Last 24 Hrs  T(C): 36.7 (13 Apr 2020 04:40), Max: 36.8 (12 Apr 2020 11:02)  T(F): 98.1 (13 Apr 2020 04:40), Max: 98.3 (12 Apr 2020 11:02)  HR: 78 (13 Apr 2020 04:40) (78 - 99)  BP: 158/95 (13 Apr 2020 04:40) (123/73 - 158/95)  BP(mean): --  RR: 19 (13 Apr 2020 04:40) (18 - 19)  SpO2: 96% (13 Apr 2020 04:40) (90% - 97%)  CAPILLARY BLOOD GLUCOSE            GENERAL: more alert, comfortable, no fevers or cough  NECK: supple, No JVD  CHEST/LUNG: clear to auscultation bilaterally; no rales, rhonchi, or wheezing b/l  HEART: normal S1, S2  ABDOMEN: BS+, soft, ND, NT   EXTREMITIES:  pulses palpable; no clubbing, cyanosis, or edema b/l LEs  SKIN: no rashes or lesions      LABS:

## 2020-04-13 NOTE — CHART NOTE - NSCHARTNOTEFT_GEN_A_CORE
PROCEDURE NOTE:  Central Line removal  Site: Right IJ Triple Lumen Catheter     Requested by PMD to remove the Central line. Explained the procedure to the patient. Placed in Trendelenburg. Catheter removed and tip intact. Pressure applied for greater than 5 mins, no hematoma or bleeding noted. Patient tolerated procedure well. A DSD applied, instructed to keep patient supine for 20-30 mins. RN aware.

## 2020-04-13 NOTE — PROGRESS NOTE ADULT - ASSESSMENT
54M w/ ETtOH abuse w/ DTs. ADmitted w/ alcohol intoxication and subsequent withdrawal. Intubated for acute hypoxemic respiratory failure likely secondary to aspiration,  extubated. Also had septic shock which has now resolved and ROSA.     ETOH withdrawel   - Stop phenobarb. CIWA monitoring. Change to Librium 25 mg QID with slow taper.       fever   - 2/2 gram negative PNA . COVID-19 negative x 2; procal elevated. ID has placed on Meropenum 1,000 every 8 hours as fevers persist for presumed aspiration pneumonia. Treatment started for COVID with CT findings, Plaquenil for 5 total days. CT chest shows lower lobe pneumonia consistent with COVID. MVI/Thiamine/Folate. Doing well on 2L NC. attempt to ween tomorrow.  Completed TOV, successful. Will stop Meropemen, appears mostly COVID infiltrates lower lobe on CXR.    Finish COVID treatment Monday, discharge home possible Tuesday if tolerates PO diet. PT reeval ordered.      Discharge home Tuesday. Trial soft diet. Mental status more alert, daily.  Need to stop restraints.

## 2020-04-14 ENCOUNTER — TRANSCRIPTION ENCOUNTER (OUTPATIENT)
Age: 55
End: 2020-04-14

## 2020-04-14 VITALS — RESPIRATION RATE: 18 BRPM | OXYGEN SATURATION: 96 % | HEART RATE: 84 BPM

## 2020-04-14 PROCEDURE — 99232 SBSQ HOSP IP/OBS MODERATE 35: CPT

## 2020-04-14 RX ORDER — METOPROLOL TARTRATE 50 MG
1 TABLET ORAL
Qty: 90 | Refills: 0
Start: 2020-04-14 | End: 2020-05-13

## 2020-04-14 RX ADMIN — PANTOPRAZOLE SODIUM 40 MILLIGRAM(S): 20 TABLET, DELAYED RELEASE ORAL at 06:21

## 2020-04-14 RX ADMIN — Medication 1 TABLET(S): at 12:37

## 2020-04-14 RX ADMIN — ENOXAPARIN SODIUM 40 MILLIGRAM(S): 100 INJECTION SUBCUTANEOUS at 12:37

## 2020-04-14 RX ADMIN — Medication 100 MILLIGRAM(S): at 12:37

## 2020-04-14 RX ADMIN — Medication 1 MILLIGRAM(S): at 12:37

## 2020-04-14 RX ADMIN — Medication 25 MILLIGRAM(S): at 06:21

## 2020-04-14 NOTE — PHYSICAL THERAPY INITIAL EVALUATION ADULT - GAIT DEVIATIONS NOTED, PT EVAL
loss of balance x 2 (unable to self correct)/decreased dean/decreased weight-shifting ability/decreased stride length/decreased step length

## 2020-04-14 NOTE — DISCHARGE NOTE PROVIDER - HOSPITAL COURSE
54M w/ ETtOH abuse w/ DTs. Admitted w/ alcohol intoxication and subsequent withdrawal. Hospital course- Intubated for acute hypoxemic respiratory failure likely secondary to aspiration,  extubated. ETOH withdrawal- librium taper.     Sepsis- 2/2 gram negative PNA . COVID-19 negative x 2; procal elevated. ID has placed on Meropenum 1,000 every 8 hours as fevers persist for presumed aspiration pneumonia. Treatment started for COVID with CT findings, Plaquenil for 5 total days. CT chest shows lower lobe pneumonia consistent with COVID. MVI/Thiamine/Folate.  Patient deemed clinically stable for discharge and to follow up with PMD.

## 2020-04-14 NOTE — PROGRESS NOTE ADULT - ASSESSMENT
Discahrge to home later today. Completed treatment for COVID. Librium taper after discharge. See med list and summary.

## 2020-04-14 NOTE — PROGRESS NOTE ADULT - SUBJECTIVE AND OBJECTIVE BOX
INTERVAL HPI/OVERNIGHT EVENTS:  Pt seen and examined at bedside.     Allergies/Intolerance: No Known Allergies      MEDICATIONS  (STANDING):  chlorhexidine 2% Cloths 1 Application(s) Topical <User Schedule>  chlorhexidine 4% Liquid 1 Application(s) Topical <User Schedule>  enoxaparin Injectable 40 milliGRAM(s) SubCutaneous daily  folic acid 1 milliGRAM(s) Oral daily  metoprolol tartrate 25 milliGRAM(s) Oral three times a day  multivitamin 1 Tablet(s) Oral daily  pantoprazole    Tablet 40 milliGRAM(s) Oral before breakfast  thiamine 100 milliGRAM(s) Oral daily    MEDICATIONS  (PRN):  acetaminophen   Tablet .. 650 milliGRAM(s) Oral every 4 hours PRN Temp greater or equal to 38C (100.4F), Mild Pain (1 - 3)  acetaminophen  Suppository .. 650 milliGRAM(s) Rectal every 6 hours PRN Temp greater or equal to 38C (100.4F)  chlordiazePOXIDE 25 milliGRAM(s) Oral every 6 hours PRN Anxiety and/or Alcohol Withdrawal Symptoms        ROS: all systems reviewed and wnl      PHYSICAL EXAMINATION:  Vital Signs Last 24 Hrs  T(C): 36.9 (14 Apr 2020 04:40), Max: 36.9 (13 Apr 2020 17:24)  T(F): 98.5 (14 Apr 2020 04:40), Max: 98.5 (13 Apr 2020 17:24)  HR: 82 (14 Apr 2020 04:40) (82 - 89)  BP: 139/64 (14 Apr 2020 04:40) (116/79 - 139/64)  BP(mean): --  RR: 18 (14 Apr 2020 04:40) (16 - 18)  SpO2: 95% (14 Apr 2020 04:40) (94% - 99%)  CAPILLARY BLOOD GLUCOSE          04-13 @ 07:01  -  04-14 @ 07:00  --------------------------------------------------------  IN: 480 mL / OUT: 0 mL / NET: 480 mL        GENERAL:   NECK: supple, No JVD  CHEST/LUNG: clear to auscultation bilaterally; no rales, rhonchi, or wheezing b/l  HEART: normal S1, S2  ABDOMEN: BS+, soft, ND, NT   EXTREMITIES:  pulses palpable; no clubbing, cyanosis, or edema b/l LEs  SKIN: no rashes or lesions      LABS: INTERVAL HPI/OVERNIGHT EVENTS:  Pt seen and examined at bedside.     Allergies/Intolerance: No Known Allergies      MEDICATIONS  (STANDING):  chlorhexidine 2% Cloths 1 Application(s) Topical <User Schedule>  chlorhexidine 4% Liquid 1 Application(s) Topical <User Schedule>  enoxaparin Injectable 40 milliGRAM(s) SubCutaneous daily  folic acid 1 milliGRAM(s) Oral daily  metoprolol tartrate 25 milliGRAM(s) Oral three times a day  multivitamin 1 Tablet(s) Oral daily  pantoprazole    Tablet 40 milliGRAM(s) Oral before breakfast  thiamine 100 milliGRAM(s) Oral daily    MEDICATIONS  (PRN):  acetaminophen   Tablet .. 650 milliGRAM(s) Oral every 4 hours PRN Temp greater or equal to 38C (100.4F), Mild Pain (1 - 3)  acetaminophen  Suppository .. 650 milliGRAM(s) Rectal every 6 hours PRN Temp greater or equal to 38C (100.4F)  chlordiazePOXIDE 25 milliGRAM(s) Oral every 6 hours PRN Anxiety and/or Alcohol Withdrawal Symptoms        ROS: all systems reviewed and wnl      PHYSICAL EXAMINATION:  Vital Signs Last 24 Hrs  T(C): 36.9 (14 Apr 2020 04:40), Max: 36.9 (13 Apr 2020 17:24)  T(F): 98.5 (14 Apr 2020 04:40), Max: 98.5 (13 Apr 2020 17:24)  HR: 82 (14 Apr 2020 04:40) (82 - 89)  BP: 139/64 (14 Apr 2020 04:40) (116/79 - 139/64)  BP(mean): --  RR: 18 (14 Apr 2020 04:40) (16 - 18)  SpO2: 95% (14 Apr 2020 04:40) (94% - 99%)  CAPILLARY BLOOD GLUCOSE          04-13 @ 07:01  -  04-14 @ 07:00  --------------------------------------------------------  IN: 480 mL / OUT: 0 mL / NET: 480 mL        GENERAL: stable, in bed, tired, no new complaints   NECK: supple, No JVD  CHEST/LUNG: clear to auscultation bilaterally; no rales, rhonchi, or wheezing b/l  HEART: normal S1, S2  ABDOMEN: BS+, soft, ND, NT   EXTREMITIES:  pulses palpable; no clubbing, cyanosis, or edema b/l LEs  SKIN: no rashes or lesions      LABS:

## 2020-04-14 NOTE — DISCHARGE NOTE NURSING/CASE MANAGEMENT/SOCIAL WORK - PATIENT PORTAL LINK FT
You can access the FollowMyHealth Patient Portal offered by [facility] by registering at the following website: [email or web address]. By joining [employer]’s FollowMyHealth portal, you will also be able to view your health information using other applications (apps) compatible with our system.

## 2020-04-14 NOTE — DISCHARGE NOTE NURSING/CASE MANAGEMENT/SOCIAL WORK - NSFLUVACAGEDISCH_IMM_ALL_CORE
General appearance:Awake alert oriented x3, in moderate distress due to back and and headache  Skin: Warm and dry ; no rashes  Head: Normocephalic. No masses, lesions or tenderness noted  Eyes: Conjunctivae pink, sclera white. PERRL,EOM-I  Ears: External ears normal  Nose/Sinuses: Nares normal. Septum midline. Mucosa normal. No drainage; nasal cannula oxygen in place  Oropharynx: Oropharynx clear with no exudate seen  Neck: Supple. No jugular venous distension, lymphadenopathy or thyromegaly Trachea midline  Lungs: Clear to auscultation bilaterally. No rhonchi, crackles or wheezes  Heart: S1 S2  Regular rate and rhythm. No rub, murmur or gallop  Abdomen: Soft, non-tender. BS normal. No masses, organomegaly; no rebound or guarding  Extremities: No edema, Peripheral pulses weak  Musculoskeletal: Muscular strength appears weak   Neuro:   II-XII grossly intact . ALTMAN equally    TELEMETRY: REVIEWED--Telemetry: Sinus    ASSESSMENT:   Principal Problem:    Apraxia  Active Problems:    Pulmonary hypertension    CKD (chronic kidney disease) stage 3, GFR 30-59 ml/min    Hypoxia    CAD (coronary artery disease)    S/P CABG x 2    COPD (chronic obstructive pulmonary disease) (HCC)    Ataxia    TIA (transient ischemic attack)    Acute respiratory failure with hypoxia in the setting of hypoxia & COPD, being treated with O2 @ 4 L, QVAR, SpO2 monitoring. Normal pressure hydrocephalus          Resolved Problems:    * No resolved hospital problems.  *      PLAN:  SEE ORDERS      RE  CHANGES AND FINDINGS   Medications reviewed with patient  GI prophylaxis  DVT prophylaxis  Consultants notes reviewed  Await results of cervical MRI  May need further workup and testing for low pressure hydrocephalus  Await repeat hemoglobin A1c  Continue other treatments  Replace magnesium IV  Repeat magnesium sulfate IV today 2 g  Neurosurgery has been consulted for possible cervical cord compression versus low pressure hydrocephalus and falling  Continue potassium supplements  Continue nasal cannula oxygen  Recheck labs in a.m. Await results of improvement  Recheck labs in a.m. Continue with Coreg  Continue Qvar  Continue Demadex  Lumbar drainage system has been removed  Replace oral potassium  Spironolactone 25 mg has been started  Recheck labs in a.m. Percocet 10 mg every 4 hours when necessary  May need to increase Coreg dosage, hypertension  Continue lisinopril 40 mg daily  Await further neurosurgical treatment  Follow labs      Discussed with patient and nursing.       Miguelito Candelaria DO     10:40 AM     7/29/2018    TIME >35 MINUTES      Active Hospital Problems    Diagnosis    Acute on chronic respiratory failure with hypoxia and hypercapnia (HCC) [Z59.60, J96.22]     Priority: High     Class: Acute    Pulmonary hypertension [I27.20]     Priority: High     Class: Chronic    Apraxia [R48.2]     Priority: High     Class: Acute    CKD (chronic kidney disease) stage 3, GFR 30-59 ml/min [N18.3]     Priority: High    Hypoxia [R09.02]     Priority: High     Class: Acute    Normal pressure hydrocephalus [G91.2]    Sinus pause [I45.5]    Stroke-like symptoms [R29.90]    Ataxia [R27.0]    TIA (transient ischemic attack) [G45.9]    COPD (chronic obstructive pulmonary disease) (HCC) [J44.9]    CAD (coronary artery disease) [I25.10]    S/P CABG x 2 [Z95.1]                 ------------  INFORMATION  -----------      DIET:DIET GENERAL;        Allergies   Allergen Reactions    Pentazocine Lactate     Sulfa Antibiotics          MEDICATION SIDE EFFECTS:none       SCHEDULED MEDS:                                 Current Facility-Administered Medications   Medication Dose Route Frequency Provider Last Rate Last Dose    potassium chloride (KLOR-CON M) extended release tablet 20 mEq  20 mEq Oral BID  Bakari Dawn MD   20 mEq at 07/29/18 0852    fentaNYL (SUBLIMAZE) injection 25 mcg  25 mcg Intravenous Q1H PRN Tommy Wilson MD   25 mcg at 07/28/18 1226    oxyCODONE-acetaminophen (PERCOCET) 5-325 MG per tablet 1 tablet  1 tablet Oral Q4H PRN Gay Richards MD   1 tablet at 07/29/18 0948    Or    oxyCODONE-acetaminophen (PERCOCET) 5-325 MG per tablet 2 tablet  2 tablet Oral Q4H PRN Gay Richards MD   2 tablet at 07/28/18 2159    butalbital-acetaminophen-caffeine (FIORICET, ESGIC) per tablet 1 tablet  1 tablet Oral Q4H PRN Gay Richards MD   1 tablet at 07/29/18 0528    acetaminophen (TYLENOL) tablet 650 mg  650 mg Oral Q4H PRN Jeanie Vizcaino MD   650 mg at 07/28/18 9813    carvedilol (COREG) tablet 12.5 mg  12.5 mg Oral BID Rupert Soda, DO   12.5 mg at 07/29/18 2269    spironolactone (ALDACTONE) tablet 25 mg  25 mg Oral Daily Rupert Soda, DO   25 mg at 07/29/18 3581    perflutren lipid microspheres (DEFINITY) injection 1.65 mg  1.5 mL Intravenous ONCE PRN Rupert Soda, DO        traZODone (DESYREL) tablet 150 mg  150 mg Oral Nightly [facility], DO   150 mg at 07/28/18 2026    torsemide (DEMADEX) tablet 20 mg  20 mg Oral Daily Malik [facility]   20 mg at 07/29/18 0853    rOPINIRole (REQUIP) tablet 3 mg  3 mg Oral BID [facility], DO   3 mg at 07/29/18 0851    oxybutynin (DITROPAN-XL) extended release tablet 10 mg  10 mg Oral Daily [facility], DO   10 mg at 07/29/18 0854    lisinopril (PRINIVIL;ZESTRIL) tablet 40 mg  40 mg Oral Daily [facility], DO   40 mg at 07/29/18 6720    isosorbide mononitrate (IMDUR) extended release tablet 30 mg  30 mg Oral Daily [facility], DO   30 mg at 07/29/18 5735    gabapentin (NEURONTIN) capsule 300 mg  300 mg Oral TID Bailey Vieira DO   300 mg at 07/29/18 6293    furosemide (LASIX) tablet 20 mg  20 mg Oral Daily PRN Bailey Vieira DO        beclomethasone (QVAR) 80 MCG/ACT inhaler 4 puff  4 puff Inhalation BID Bailey Vieira DO   4 puff at 07/29/18 0849    fluticasone (FLONASE) 50 MCG/ACT nasal spray 1 spray  1 spray Nasal Daily PRN Bailey Vieira DO        buPROPion (WELLBUTRIN XL) extended release tablet 300 mg  300 mg Oral QAM Malik Gmoezaurorak, DO   300 mg at 18 0854    albuterol (PROVENTIL) nebulizer solution 2.5 mg  2.5 mg Nebulization Q6H PRN Anthony Gomezk,         pantoprazole (PROTONIX) tablet 40 mg  40 mg Oral QAM AC Malik LORNA Gomezk, DO   40 mg at 18 0541    ondansetron (ZOFRAN) injection 4 mg  4 mg Intravenous Q6H PRN Anthony Vieira, DO   4 mg at /85/ 5890    folic acid (FOLVITE) tablet 1 mg  1 mg Oral Daily Anthony Vieira, DO   1 mg at 18 9010     Scheduled Meds:   potassium chloride  20 mEq Oral BID WC    carvedilol  12.5 mg Oral BID    spironolactone  25 mg Oral Daily    traZODone  150 mg Oral Nightly    torsemide  20 mg Oral Daily    rOPINIRole  3 mg Oral BID    oxybutynin  10 mg Oral Daily    lisinopril  40 mg Oral Daily    isosorbide mononitrate  30 mg Oral Daily    gabapentin  300 mg Oral TID    beclomethasone  4 puff Inhalation BID    buPROPion  300 mg Oral QAM    pantoprazole  40 mg Oral QAM AC    folic acid  1 mg Oral Daily       Continuous Infusions:        Data:   Temperature:  Current - Temp: 98.3 °F (36.8 °C);  Max - Temp  Av.8 °F (37.1 °C)  Min: 98.3 °F (36.8 °C)  Max: 99.2 °F (37.3 °C)    Respiratory Rate : Resp  Av.2  Min: 12  Max: 31    Pulse Range: Pulse  Av.1  Min: 71  Max: 93    Blood Presuure Range:  Systolic (17YEA), VPA:729 , Min:103 , ZTU:366   ; Diastolic (78FZA), JCT:47, Min:64, Max:99      Pulse ox Range: SpO2  Av.9 %  Min: 91 %  Max: 97 %    Patient Vitals for the past 8 hrs:   BP Temp Temp src Pulse Resp SpO2   18 0700 (!) 159/99 - - 80 26 91 %   18 0600 (!) 161/96 - - 77 28 95 %   18 0500 130/74 - - 71 12 97 %   18 0400 129/69 98.3 °F (36.8 °C) Temporal 72 30 94 %   18 0300 103/64 - - 72 23 93 %         Intake/Output Summary (Last 24 hours) at 18 1040  Last data filed at 18 0547   Gross per 24 hour   Intake             1215 ml Adult LYMPHOPCT  26.6   MONOPCT  8.4   BASOPCT  0.6   MONOSABS  0.60   LYMPHSABS  1.91   EOSABS  0.12   BASOSABS  0.04          H & H :  Recent Labs      07/29/18   0512   HGB  11.1*       TSH:  No results for input(s): TSH in the last 72 hours. GLUCOSE:No results for input(s): POCGLU in the last 72 hours. CMP:  Recent Labs      07/26/18   1533  07/28/18   0700  07/29/18   0512   NA  140  144  142   K  3.3*  3.4*  3.7   CL  95*  100  95*   CO2  38*  33*  37*   BUN  12  11  12   CREATININE  0.8  0.7  0.8   GFRAA  >60  >60  >60   LABGLOM  >60  >60  >60   GLUCOSE  182*  193*  132*   PROT   --    --   6.6   LABALBU   --    --   2.7*   CALCIUM  8.5*  8.5*  8.8   BILITOT   --    --   0.6   ALKPHOS   --    --   90   AST   --    --   15   ALT   --    --   7        BNP:No results found for: BNP    PROTIME/INR:  Recent Labs      07/26/18   1611   PROTIME  13.8*   INR  1.2       CRP: No results for input(s): CRP in the last 72 hours. ESR:No results for input(s): SEDRATE in the last 72 hours. LIPASE , AMYLASE:  Lab Results   Component Value Date    LIPASE 45 07/30/2014      No results found for: AMYLASE    ABGs: No results found for: PH, PO2, PCO2    CARDIAC:   No results for input(s): CKTOTAL, CKMB, CKMBINDEX, TROPONINI in the last 72 hours. Lipid Profile:   Lab Results   Component Value Date    TRIG 108 07/24/2018    HDL 21 07/24/2018    LDLCALC 49 07/24/2018    CHOL 92 07/24/2018        Lab Results   Component Value Date    LABA1C 10.4 (H) 07/24/2018            RADIOLOGY: REVIEWED AVAILABLE REPORT  FLUORO FOR SURGICAL PROCEDURES   Final Result   Intraoperative fluoroscopy, as detailed above. MRI CERVICAL SPINE WO CONTRAST   Final Result      1. Limited study due to patient's motion. 2. No compression fracture or a spondylolisthesis. 3. C5-C6 vertebral fusion. 4. Moderate to severe degenerative disc disease at C6-C7.    5. Broad-based focal right paracentral disc protrusion at C4-C5 and   moderate central

## 2020-04-14 NOTE — PHYSICAL THERAPY INITIAL EVALUATION ADULT - PERTINENT HX OF CURRENT PROBLEM, REHAB EVAL
Pt is a 56yo M admitted 2/2 ETOH withdrawal. Pt is s/p RRT & code grey on 3/28 2/2 persistent agitation (transferred to ICU for Precedex drip). Pt is s/p RRT on 4/2 2/2 hypoxia & tachypnea (r/o COVID --> negative x 2, + false negative per ID). Pt was intubated & transferred to ICU. Pt successfully extubated on 4/4. Pt s/p inpatient mechanical fall on 4/9 2/2 agitated & restless behavior (ie: getting up out of bed on his own). CIWA as of 4/14 = 3. Pt now medically stable for D/C planning.

## 2020-04-14 NOTE — PROGRESS NOTE ADULT - REASON FOR ADMISSION
Abdominal Pain

## 2020-04-14 NOTE — DISCHARGE NOTE PROVIDER - NSDCCPCAREPLAN_GEN_ALL_CORE_FT
PRINCIPAL DISCHARGE DIAGNOSIS  Diagnosis: Alcohol withdrawal  Assessment and Plan of Treatment: -Refrain from ETOH.      SECONDARY DISCHARGE DIAGNOSES  Diagnosis: Fever, unspecified fever cause  Assessment and Plan of Treatment: -continue medications as prescribed. Monitor temperature daily. Increase hydration. Use appropriate PPE at all times.

## 2020-04-14 NOTE — PHYSICAL THERAPY INITIAL EVALUATION ADULT - BALANCE TRAINING, PT EVAL
GOAL: pt will be able to independently sit at edge of bed while performing UE manipulation without loss of balance within 4 weeks. pt will be able to independently ambulate 300ft without device & without loss of balance within 4 weeks

## 2020-04-14 NOTE — DISCHARGE NOTE PROVIDER - NSDCMRMEDTOKEN_GEN_ALL_CORE_FT
atorvastatin 20 mg oral tablet: 1 tab(s) orally once a day (at bedtime)  chlordiazePOXIDE 25 mg oral capsule: 1 cap(s) orally every 6 hours, As Needed MDD:4 tabs   folic acid 1 mg oral tablet: 1 tab(s) orally once a day  metoprolol tartrate 25 mg oral tablet: 1 tab(s) orally 3 times a day   Multiple Vitamins oral tablet: 1 tab(s) orally once a day  pantoprazole 40 mg oral delayed release tablet: 1 tab(s) orally once a day  thiamine 100 mg oral tablet: 1 tab(s) orally once a day

## 2020-04-14 NOTE — PHYSICAL THERAPY INITIAL EVALUATION ADULT - ADDITIONAL COMMENTS
Pt lives with family in a private home, 2 entry steps (+ rail), no steps to negotiate once inside. Prior to admission pt was independent with all functional mobility including community ambulation without a device & ADL's. [facility]

## 2020-04-14 NOTE — PHYSICAL THERAPY INITIAL EVALUATION ADULT - DIAGNOSIS, PT EVAL
Decreased strength, impaired balance, impaired cognition, impaired safety awareness, impaired functional mobility

## 2020-04-17 DIAGNOSIS — N17.0 ACUTE KIDNEY FAILURE WITH TUBULAR NECROSIS: ICD-10-CM

## 2020-04-17 DIAGNOSIS — E87.6 HYPOKALEMIA: ICD-10-CM

## 2020-04-17 DIAGNOSIS — J15.6 PNEUMONIA DUE TO OTHER GRAM-NEGATIVE BACTERIA: ICD-10-CM

## 2020-04-17 DIAGNOSIS — A41.9 SEPSIS, UNSPECIFIED ORGANISM: ICD-10-CM

## 2020-04-17 DIAGNOSIS — R50.9 FEVER, UNSPECIFIED: ICD-10-CM

## 2020-04-17 DIAGNOSIS — K27.9 PEPTIC ULCER, SITE UNSPECIFIED, UNSPECIFIED AS ACUTE OR CHRONIC, WITHOUT HEMORRHAGE OR PERFORATION: ICD-10-CM

## 2020-04-17 DIAGNOSIS — Z78.1 PHYSICAL RESTRAINT STATUS: ICD-10-CM

## 2020-04-17 DIAGNOSIS — F10.221 ALCOHOL DEPENDENCE WITH INTOXICATION DELIRIUM: ICD-10-CM

## 2020-04-17 DIAGNOSIS — R65.21 SEVERE SEPSIS WITH SEPTIC SHOCK: ICD-10-CM
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
## 2020-04-17 DIAGNOSIS — K29.20 ALCOHOLIC GASTRITIS WITHOUT BLEEDING: ICD-10-CM

## 2020-04-17 DIAGNOSIS — J69.0 PNEUMONITIS DUE TO INHALATION OF FOOD AND VOMIT: ICD-10-CM
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
## 2020-04-17 DIAGNOSIS — J96.01 ACUTE RESPIRATORY FAILURE WITH HYPOXIA: ICD-10-CM

## 2020-04-17 DIAGNOSIS — I10 ESSENTIAL (PRIMARY) HYPERTENSION: ICD-10-CM

## 2020-04-17 DIAGNOSIS — F10.231 ALCOHOL DEPENDENCE WITH WITHDRAWAL DELIRIUM: ICD-10-CM

## 2020-04-17 DIAGNOSIS — E44.0 MODERATE PROTEIN-CALORIE MALNUTRITION: ICD-10-CM

## 2020-04-17 DIAGNOSIS — E78.2 MIXED HYPERLIPIDEMIA: ICD-10-CM
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
## 2020-05-05 ENCOUNTER — INPATIENT (INPATIENT)
Facility: HOSPITAL | Age: 55
LOS: 4 days | Discharge: HOME | End: 2020-05-10
Attending: INTERNAL MEDICINE | Admitting: INTERNAL MEDICINE
Payer: MEDICAID

## 2020-05-05 VITALS
HEART RATE: 107 BPM | DIASTOLIC BLOOD PRESSURE: 92 MMHG | TEMPERATURE: 98 F | WEIGHT: 149.91 LBS | HEIGHT: 67 IN | OXYGEN SATURATION: 97 % | RESPIRATION RATE: 18 BRPM | SYSTOLIC BLOOD PRESSURE: 135 MMHG

## 2020-05-05 DIAGNOSIS — I10 ESSENTIAL (PRIMARY) HYPERTENSION: ICD-10-CM

## 2020-05-05 LAB
ALBUMIN SERPL ELPH-MCNC: 3.5 G/DL — SIGNIFICANT CHANGE UP (ref 3.3–5)
ALP SERPL-CCNC: 67 U/L — SIGNIFICANT CHANGE UP (ref 40–120)
ALT FLD-CCNC: 21 U/L — SIGNIFICANT CHANGE UP (ref 12–78)
ANION GAP SERPL CALC-SCNC: 18 MMOL/L — HIGH (ref 5–17)
AST SERPL-CCNC: 33 U/L — SIGNIFICANT CHANGE UP (ref 15–37)
BASOPHILS # BLD AUTO: 0.05 K/UL — SIGNIFICANT CHANGE UP (ref 0–0.2)
BASOPHILS NFR BLD AUTO: 1.4 % — SIGNIFICANT CHANGE UP (ref 0–2)
BILIRUB SERPL-MCNC: 0.5 MG/DL — SIGNIFICANT CHANGE UP (ref 0.2–1.2)
BUN SERPL-MCNC: 10 MG/DL — SIGNIFICANT CHANGE UP (ref 7–23)
CALCIUM SERPL-MCNC: 9.2 MG/DL — SIGNIFICANT CHANGE UP (ref 8.5–10.1)
CHLORIDE SERPL-SCNC: 97 MMOL/L — SIGNIFICANT CHANGE UP (ref 96–108)
CO2 SERPL-SCNC: 24 MMOL/L — SIGNIFICANT CHANGE UP (ref 22–31)
CREAT SERPL-MCNC: 0.82 MG/DL — SIGNIFICANT CHANGE UP (ref 0.5–1.3)
EOSINOPHIL # BLD AUTO: 0.02 K/UL — SIGNIFICANT CHANGE UP (ref 0–0.5)
EOSINOPHIL NFR BLD AUTO: 0.6 % — SIGNIFICANT CHANGE UP (ref 0–6)
GLUCOSE SERPL-MCNC: 77 MG/DL — SIGNIFICANT CHANGE UP (ref 70–99)
HCT VFR BLD CALC: 34.7 % — LOW (ref 39–50)
HGB BLD-MCNC: 11.8 G/DL — LOW (ref 13–17)
IMM GRANULOCYTES NFR BLD AUTO: 0.3 % — SIGNIFICANT CHANGE UP (ref 0–1.5)
LACTATE SERPL-SCNC: 6.4 MMOL/L — CRITICAL HIGH (ref 0.7–2)
LACTATE SERPL-SCNC: 8 MMOL/L — CRITICAL HIGH (ref 0.7–2)
LIDOCAIN IGE QN: 309 U/L — SIGNIFICANT CHANGE UP (ref 73–393)
LYMPHOCYTES # BLD AUTO: 1.81 K/UL — SIGNIFICANT CHANGE UP (ref 1–3.3)
LYMPHOCYTES # BLD AUTO: 51.1 % — HIGH (ref 13–44)
MCHC RBC-ENTMCNC: 27.4 PG — SIGNIFICANT CHANGE UP (ref 27–34)
MCHC RBC-ENTMCNC: 34 GM/DL — SIGNIFICANT CHANGE UP (ref 32–36)
MCV RBC AUTO: 80.5 FL — SIGNIFICANT CHANGE UP (ref 80–100)
MONOCYTES # BLD AUTO: 0.24 K/UL — SIGNIFICANT CHANGE UP (ref 0–0.9)
MONOCYTES NFR BLD AUTO: 6.8 % — SIGNIFICANT CHANGE UP (ref 2–14)
NEUTROPHILS # BLD AUTO: 1.41 K/UL — LOW (ref 1.8–7.4)
NEUTROPHILS NFR BLD AUTO: 39.8 % — LOW (ref 43–77)
NRBC # BLD: 0 /100 WBCS — SIGNIFICANT CHANGE UP (ref 0–0)
PLATELET # BLD AUTO: 150 K/UL — SIGNIFICANT CHANGE UP (ref 150–400)
POTASSIUM SERPL-MCNC: 3.9 MMOL/L — SIGNIFICANT CHANGE UP (ref 3.5–5.3)
POTASSIUM SERPL-SCNC: 3.9 MMOL/L — SIGNIFICANT CHANGE UP (ref 3.5–5.3)
PROT SERPL-MCNC: 8.4 GM/DL — HIGH (ref 6–8.3)
RBC # BLD: 4.31 M/UL — SIGNIFICANT CHANGE UP (ref 4.2–5.8)
RBC # FLD: 15.6 % — HIGH (ref 10.3–14.5)
SODIUM SERPL-SCNC: 139 MMOL/L — SIGNIFICANT CHANGE UP (ref 135–145)
WBC # BLD: 3.54 K/UL — LOW (ref 3.8–10.5)
WBC # FLD AUTO: 3.54 K/UL — LOW (ref 3.8–10.5)

## 2020-05-05 PROCEDURE — 71045 X-RAY EXAM CHEST 1 VIEW: CPT | Mod: 26

## 2020-05-05 PROCEDURE — 99285 EMERGENCY DEPT VISIT HI MDM: CPT

## 2020-05-05 PROCEDURE — 99223 1ST HOSP IP/OBS HIGH 75: CPT | Mod: AI

## 2020-05-05 RX ORDER — FOLIC ACID 0.8 MG
1 TABLET ORAL DAILY
Refills: 0 | Status: DISCONTINUED | OUTPATIENT
Start: 2020-05-05 | End: 2020-05-10

## 2020-05-05 RX ORDER — ATORVASTATIN CALCIUM 80 MG/1
20 TABLET, FILM COATED ORAL AT BEDTIME
Refills: 0 | Status: DISCONTINUED | OUTPATIENT
Start: 2020-05-05 | End: 2020-05-10

## 2020-05-05 RX ORDER — SODIUM CHLORIDE 9 MG/ML
1000 INJECTION INTRAMUSCULAR; INTRAVENOUS; SUBCUTANEOUS ONCE
Refills: 0 | Status: COMPLETED | OUTPATIENT
Start: 2020-05-05 | End: 2020-05-05

## 2020-05-05 RX ORDER — ONDANSETRON 8 MG/1
8 TABLET, FILM COATED ORAL ONCE
Refills: 0 | Status: COMPLETED | OUTPATIENT
Start: 2020-05-05 | End: 2020-05-05

## 2020-05-05 RX ORDER — FAMOTIDINE 10 MG/ML
20 INJECTION INTRAVENOUS
Refills: 0 | Status: DISCONTINUED | OUTPATIENT
Start: 2020-05-05 | End: 2020-05-07

## 2020-05-05 RX ORDER — PANTOPRAZOLE SODIUM 20 MG/1
40 TABLET, DELAYED RELEASE ORAL
Refills: 0 | Status: DISCONTINUED | OUTPATIENT
Start: 2020-05-05 | End: 2020-05-07

## 2020-05-05 RX ORDER — MORPHINE SULFATE 50 MG/1
4 CAPSULE, EXTENDED RELEASE ORAL ONCE
Refills: 0 | Status: DISCONTINUED | OUTPATIENT
Start: 2020-05-05 | End: 2020-05-05

## 2020-05-05 RX ORDER — METOPROLOL TARTRATE 50 MG
25 TABLET ORAL DAILY
Refills: 0 | Status: DISCONTINUED | OUTPATIENT
Start: 2020-05-05 | End: 2020-05-07

## 2020-05-05 RX ORDER — FAMOTIDINE 10 MG/ML
20 INJECTION INTRAVENOUS ONCE
Refills: 0 | Status: COMPLETED | OUTPATIENT
Start: 2020-05-05 | End: 2020-05-05

## 2020-05-05 RX ORDER — THIAMINE MONONITRATE (VIT B1) 100 MG
100 TABLET ORAL DAILY
Refills: 0 | Status: DISCONTINUED | OUTPATIENT
Start: 2020-05-05 | End: 2020-05-10

## 2020-05-05 RX ADMIN — Medication 2 MILLIGRAM(S): at 20:00

## 2020-05-05 RX ADMIN — MORPHINE SULFATE 4 MILLIGRAM(S): 50 CAPSULE, EXTENDED RELEASE ORAL at 20:10

## 2020-05-05 RX ADMIN — SODIUM CHLORIDE 1000 MILLILITER(S): 9 INJECTION INTRAMUSCULAR; INTRAVENOUS; SUBCUTANEOUS at 21:30

## 2020-05-05 RX ADMIN — FAMOTIDINE 20 MILLIGRAM(S): 10 INJECTION INTRAVENOUS at 21:12

## 2020-05-05 RX ADMIN — PANTOPRAZOLE SODIUM 40 MILLIGRAM(S): 20 TABLET, DELAYED RELEASE ORAL at 23:31

## 2020-05-05 RX ADMIN — SODIUM CHLORIDE 1000 MILLILITER(S): 9 INJECTION INTRAMUSCULAR; INTRAVENOUS; SUBCUTANEOUS at 20:00

## 2020-05-05 RX ADMIN — Medication 2 MILLIGRAM(S): at 23:31

## 2020-05-05 RX ADMIN — ONDANSETRON 8 MILLIGRAM(S): 8 TABLET, FILM COATED ORAL at 20:30

## 2020-05-05 RX ADMIN — Medication 30 MILLILITER(S): at 21:12

## 2020-05-05 NOTE — ED ADULT NURSE NOTE - NSIMPLEMENTINTERV_GEN_ALL_ED
Implemented All Fall Risk Interventions:  [place] to call system. Call bell, personal items and telephone within reach. Instruct patient to call for assistance. Room bathroom lighting operational. Non-slip footwear when patient is off stretcher. Physically safe environment: no spills, clutter or unnecessary equipment. Stretcher in lowest position, wheels locked, appropriate side rails in place. Provide visual cue, wrist band, yellow gown, etc. Monitor gait and stability. Monitor for mental status changes and reorient to person, place, and time. Review medications for side effects contributing to fall risk. Reinforce activity limits and safety measures with patient and family.

## 2020-05-05 NOTE — ED PROVIDER NOTE - CLINICAL SUMMARY MEDICAL DECISION MAKING FREE TEXT BOX
54yo male with pmh of etoh abuse, htn presents complaining of abdominal pain associated with nausea and NBNB vomiting x3-4 days. Clinically sober on exam but without tremors or tongue fasciculations. Abdomen soft, nontender. No signs of trauma. Will get labs and give zofran/ fluids. Will give ativan given hx of withdrawal and subjective tremors.

## 2020-05-05 NOTE — H&P ADULT - NSHPLABSRESULTS_GEN_ALL_CORE
LABS:                        11.8   3.54  )-----------( 150      ( 05 May 2020 20:28 )             34.7     05-05    139  |  97  |  10  ----------------------------<  77  3.9   |  24  |  0.82    Ca    9.2      05 May 2020 20:28    TPro  8.4<H>  /  Alb  3.5  /  TBili  0.5  /  DBili  x   /  AST  33  /  ALT  21  /  AlkPhos  67  05-05        CAPILLARY BLOOD GLUCOSE            RADIOLOGY & ADDITIONAL TESTS:    Imaging Personally Reviewed:  [ X] YES  [ ] NO

## 2020-05-05 NOTE — ED PROVIDER NOTE - ATTENDING CONTRIBUTION TO CARE
Patient evaluated and seen with PAULA Valera agree with above history and physical - pt examined and seen by me personally - findings as seen: Pt with N/V and mild alchol withdrawal symptoms, x 5-6 days will need admission, given fluids for Lactic acidosis and mild dehydration.

## 2020-05-05 NOTE — H&P ADULT - HISTORY OF PRESENT ILLNESS
Pt is a 54 y/o male w/PMH gerd, HLD, etoh abuse presents with complaint of  abdominal pain and n/v.  Pt states he has been drinking alot forlast week  and has some abdominal discomfort in epigastric area and n/v unable to tolerate and keep down food, no hematemesis or melena or brbpr.  Pt also reports gets intermittent r hand swelling since he had a picc line in past states if elevates then at times improves, no redness of hand or arm. last drink yesterday  pt denies any fever, chills, sob, cp, palpitations, +n/+v/-d/-c no travels or sick contacts no hematemesis or melena.

## 2020-05-05 NOTE — ED PROVIDER NOTE - OBJECTIVE STATEMENT
56yo male with pmh of etoh abuse, htn presents complaining of abdominal pain associated with nausea and NBNB vomiting x3-4 days. Reports progressively worsened po intolerance. Last drink was "too much" vodka last night. Reports hx of withdrawal and states he is starting to feel a bit shakey. Denies diarrhea, bloody stool, fever/chills, chest pain, palpitations and other associated sx.

## 2020-05-05 NOTE — H&P ADULT - NSHPPHYSICALEXAM_GEN_ALL_CORE
Vital Signs Last 24 Hrs  T(C): 36.8 (05 May 2020 22:12), Max: 36.8 (05 May 2020 22:12)  T(F): 98.3 (05 May 2020 22:12), Max: 98.3 (05 May 2020 22:12)  HR: 99 (05 May 2020 22:12) (99 - 107)  BP: 136/84 (05 May 2020 22:12) (135/92 - 136/84)  BP(mean): --  RR: 18 (05 May 2020 22:12) (18 - 18)  SpO2: 99% (05 May 2020 22:12) (97% - 99%)    PHYSICAL EXAM:    GENERAL: NAD, well-groomed, well-developed  HEAD:  Atraumatic, Normocephalic  EYES: EOMI, PERRLA, conjunctiva and sclera clear  ENMT: No tonsillar erythema, exudates, or enlargement; Moist mucous membranes, No lesions  NECK: Supple, No JVD, Normal thyroid  NERVOUS SYSTEM:  Alert & Oriented X3, Good concentration; Motor Strength 5/5 B/L upper and lower extremities; DTRs 2+ intact and symmetric  CHEST/LUNG: Clear to percussion bilaterally; No rales, rhonchi, wheezing, or rubs  HEART: Regular rate and rhythm; No rubs, or gallops, +S1,S2  ABDOMEN: +mild epigastric tenderness, +bs, no rebound or guarding  EXTREMITIES:  2+ Peripheral Pulses, No clubbing, cyanosis, or edema  LYMPH: No cervical adenopathy  RECTAL: deferred  BREAST: No palpatble masses, skin no lesions   : deferred  SKIN: No rashes or lesions    IMPROVE VTE Individual Risk Assessment          RISK                                                          Points  [  ] Previous VTE                                                3  [  ] Thrombophilia                                             2  [  ] Lower limb paralysis                                    2        (unable to hold up >15 seconds)    [  ] Current Cancer                                             2         (within 6 months)  [  ] Immobilization > 24 hrs                              1  [  ] ICU/CCU stay > 24 hours                            1  [  ] Age > 60                                                    1  IMPROVE VTE Score ____0_____

## 2020-05-05 NOTE — H&P ADULT - ASSESSMENT
56 y/o male w/PMH gerd, HLD, etoh abuse presents with complaint of  intoxication and n/v epigastric pain and poor po food intake.

## 2020-05-06 LAB
APPEARANCE UR: CLEAR — SIGNIFICANT CHANGE UP
BACTERIA # UR AUTO: NEGATIVE — SIGNIFICANT CHANGE UP
BILIRUB UR-MCNC: NEGATIVE — SIGNIFICANT CHANGE UP
COLOR SPEC: YELLOW — SIGNIFICANT CHANGE UP
DIFF PNL FLD: NEGATIVE — SIGNIFICANT CHANGE UP
GLUCOSE UR QL: NEGATIVE MG/DL — SIGNIFICANT CHANGE UP
KETONES UR-MCNC: ABNORMAL
LEUKOCYTE ESTERASE UR-ACNC: NEGATIVE — SIGNIFICANT CHANGE UP
NITRITE UR-MCNC: NEGATIVE — SIGNIFICANT CHANGE UP
PH UR: 7 — SIGNIFICANT CHANGE UP (ref 5–8)
PROT UR-MCNC: 15 MG/DL
RBC CASTS # UR COMP ASSIST: NEGATIVE /HPF — SIGNIFICANT CHANGE UP (ref 0–4)
SARS-COV-2 RNA SPEC QL NAA+PROBE: SIGNIFICANT CHANGE UP
SP GR SPEC: 1.01 — SIGNIFICANT CHANGE UP (ref 1.01–1.02)
UROBILINOGEN FLD QL: NEGATIVE MG/DL — SIGNIFICANT CHANGE UP
WBC UR QL: NEGATIVE — SIGNIFICANT CHANGE UP

## 2020-05-06 PROCEDURE — 99233 SBSQ HOSP IP/OBS HIGH 50: CPT

## 2020-05-06 PROCEDURE — 93971 EXTREMITY STUDY: CPT | Mod: 26,RT

## 2020-05-06 RX ORDER — SODIUM CHLORIDE 9 MG/ML
1000 INJECTION, SOLUTION INTRAVENOUS
Refills: 0 | Status: DISCONTINUED | OUTPATIENT
Start: 2020-05-06 | End: 2020-05-10

## 2020-05-06 RX ORDER — BACLOFEN 100 %
10 POWDER (GRAM) MISCELLANEOUS EVERY 12 HOURS
Refills: 0 | Status: COMPLETED | OUTPATIENT
Start: 2020-05-06 | End: 2020-05-07

## 2020-05-06 RX ADMIN — ATORVASTATIN CALCIUM 20 MILLIGRAM(S): 80 TABLET, FILM COATED ORAL at 21:42

## 2020-05-06 RX ADMIN — Medication 2 MILLIGRAM(S): at 02:33

## 2020-05-06 RX ADMIN — Medication 2 MILLIGRAM(S): at 10:19

## 2020-05-06 RX ADMIN — Medication 1 TABLET(S): at 11:25

## 2020-05-06 RX ADMIN — PANTOPRAZOLE SODIUM 40 MILLIGRAM(S): 20 TABLET, DELAYED RELEASE ORAL at 05:59

## 2020-05-06 RX ADMIN — Medication 100 MILLIGRAM(S): at 11:25

## 2020-05-06 RX ADMIN — Medication 1 MILLIGRAM(S): at 11:26

## 2020-05-06 RX ADMIN — FAMOTIDINE 20 MILLIGRAM(S): 10 INJECTION INTRAVENOUS at 17:17

## 2020-05-06 RX ADMIN — Medication 10 MILLIGRAM(S): at 17:17

## 2020-05-06 RX ADMIN — FAMOTIDINE 20 MILLIGRAM(S): 10 INJECTION INTRAVENOUS at 05:59

## 2020-05-06 RX ADMIN — Medication 1.5 MILLIGRAM(S): at 21:42

## 2020-05-06 RX ADMIN — Medication 2 MILLIGRAM(S): at 05:58

## 2020-05-06 RX ADMIN — Medication 25 MILLIGRAM(S): at 05:59

## 2020-05-06 RX ADMIN — Medication 2 MILLIGRAM(S): at 17:16

## 2020-05-06 RX ADMIN — PANTOPRAZOLE SODIUM 40 MILLIGRAM(S): 20 TABLET, DELAYED RELEASE ORAL at 17:17

## 2020-05-06 RX ADMIN — Medication 2 MILLIGRAM(S): at 14:13

## 2020-05-06 RX ADMIN — SODIUM CHLORIDE 500 MILLILITER(S): 9 INJECTION, SOLUTION INTRAVENOUS at 03:55

## 2020-05-06 NOTE — CHART NOTE - NSCHARTNOTEFT_GEN_A_CORE
Hospitalist Medicine PA    Requested by RN to place an IV heplock in patient. As per RN multiple attempts have been made to place IV as patient is a difficult stick. Placed IV heplock in left forearm using ultrasound guidance. 20 gauge IV flushed and secured. RN aware.

## 2020-05-06 NOTE — CONSULT NOTE ADULT - SUBJECTIVE AND OBJECTIVE BOX
[facility] NEPHROLOGY SERVICES, [facility]  NEPHROLOGY AND HYPERTENSION  [address]  [address]  [place] [address]  [phone]    MD JACEY BUSTAMANTE MD ANDREY GONCHARUK, MD MADHU KORRAPATI, MD YELENA ROSENBERG, MD BINNY KOSHY, MD CHRISTOPHER CAPUTO, MD EDWARD BOVER, MD      Information from chart:  "Patient is a 55y old  Male who presents with a chief complaint of epigastric pain n/v (06 May 2020 13:05)    HPI:  Pt is a 56 y/o male w/PMH gerd, HLD, etoh abuse presents with complaint of  abdominal pain and n/v.  Pt states he has been drinking alot forlast week  and has some abdominal discomfort in epigastric area and n/v unable to tolerate and keep down food, no hematemesis or melena or brbpr.  Pt also reports gets intermittent r hand swelling since he had a picc line in past states if elevates then at times improves, no redness of hand or arm. last drink yesterday  pt denies any fever, chills, sob, cp, palpitations, +n/+v/-d/-c no travels or sick contacts no hematemesis or melena. (05 May 2020 23:12)   "    Patient with hiccups  Hx of ROSA PNA; COVID negative      PAST MEDICAL & SURGICAL HISTORY:  DTs (delirium tremens)  Substance abuse  Gastritis  EtOH dependence  Mixed hyperlipidemia  PUD (peptic ulcer disease)  No significant past surgical history    FAMILY HISTORY:  No pertinent family history in first degree relatives (Father, Mother)    Allergies    No Known Allergies    Intolerances      Home Medications:  atorvastatin 20 mg oral tablet: 1 tab(s) orally once a day (at bedtime) (03 Mar 2020 10:19)  folic acid 1 mg oral tablet: 1 tab(s) orally once a day (03 Mar 2020 10:19)  Multiple Vitamins oral tablet: 1 tab(s) orally once a day (03 Mar 2020 10:19)  thiamine 100 mg oral tablet: 1 tab(s) orally once a day (03 Mar 2020 10:19)    MEDICATIONS  (STANDING):  atorvastatin 20 milliGRAM(s) Oral at bedtime  famotidine    Tablet 20 milliGRAM(s) Oral two times a day  folic acid 1 milliGRAM(s) Oral daily  lactated ringers. 1000 milliLiter(s) (500 mL/Hr) IV Continuous <Continuous>  LORazepam   Injectable   IV Push   LORazepam   Injectable 2 milliGRAM(s) IV Push every 4 hours  LORazepam   Injectable 1.5 milliGRAM(s) IV Push every 4 hours  metoprolol tartrate 25 milliGRAM(s) Oral daily  multivitamin 1 Tablet(s) Oral daily  pantoprazole  Injectable 40 milliGRAM(s) IV Push two times a day  thiamine 100 milliGRAM(s) Oral daily    MEDICATIONS  (PRN):  LORazepam   Injectable 2 milliGRAM(s) IV Push every 1 hour PRN CIWA-Ar score 8 or greater    Vital Signs Last 24 Hrs  T(C): 37.1 (06 May 2020 10:37), Max: 37.1 (05 May 2020 23:16)  T(F): 98.7 (06 May 2020 10:37), Max: 98.7 (05 May 2020 23:16)  HR: 67 (06 May 2020 10:37) (67 - 107)  BP: 109/61 (06 May 2020 10:37) (108/79 - 136/84)  BP(mean): --  RR: 18 (06 May 2020 10:37) (16 - 19)  SpO2: 98% (06 May 2020 10:37) (96% - 99%)    Daily Height in cm: 170.18 (06 May 2020 04:03)    Daily Weight in k.8 (06 May 2020 04:03)    20 @ 07:01  -  20 @ 14:49  --------------------------------------------------------  IN: 354 mL / OUT: 0 mL / NET: 354 mL      CAPILLARY BLOOD GLUCOSE        PHYSICAL EXAM:      T(C): 37.1 (20 @ 10:37), Max: 37.1 (20 @ 23:16)  HR: 67 (20 @ 10:37) (67 - 107)  BP: 109/61 (20 @ 10:37) (108/79 - 136/84)  RR: 18 (05-06-20 @ 10:37) (16 - 19)  SpO2: 98% (20 @ 10:37) (96% - 99%)  Wt(kg): --  Lungs clear   Heart S1S2  Abd soft  Extremities:   1edema                  139  |  97  |  10  ----------------------------<  77  3.9   |  24  |  0.82    Ca    9.2      05 May 2020 20:28    TPro  8.4<H>  /  Alb  3.5  /  TBili  0.5  /  DBili  x   /  AST  33  /  ALT  21  /  AlkPhos  67                            11.8   3.54  )-----------( 150      ( 05 May 2020 20:28 )             34.7     Creatinine Trend: 0.82<--, 1.11<--, 1.32<--, 1.25<--          Assessment   Etoh abuse; hx ROSA proteinuria;  Lactic acidosis likely Type 2; poor clearance   Hiccups    Plan  Supportive care  Trial Baclofen     Misha Abebe MD [facility] NEPHROLOGY SERVICES, [facility]  NEPHROLOGY AND HYPERTENSION  [address]  [address]  [place] [address]  [phone]    MD JACEY BUSTAMANTE MD ANDREY GONCHARUK, MD MADHU KORRAPATI, MD YELENA ROSENBERG, MD BINNY KOSHY, MD CHRISTOPHER CAPUTO, MD EDWARD BOVER, MD      Information from chart:  "Patient is a 55y old  Male who presents with a chief complaint of epigastric pain n/v (06 May 2020 13:05)    HPI:  Pt is a 56 y/o male w/PMH gerd, HLD, etoh abuse presents with complaint of  abdominal pain and n/v.  Pt states he has been drinking alot forlast week  and has some abdominal discomfort in epigastric area and n/v unable to tolerate and keep down food, no hematemesis or melena or brbpr.  Pt also reports gets intermittent r hand swelling since he had a picc line in past states if elevates then at times improves, no redness of hand or arm. last drink yesterday  pt denies any fever, chills, sob, cp, palpitations, +n/+v/-d/-c no travels or sick contacts no hematemesis or melena. (05 May 2020 23:12)   "    Patient with hiccups  Hx of ROSA PNA; COVID negative      PAST MEDICAL & SURGICAL HISTORY:  DTs (delirium tremens)  Substance abuse  Gastritis  EtOH dependence  Mixed hyperlipidemia  PUD (peptic ulcer disease)  No significant past surgical history    FAMILY HISTORY:  No pertinent family history in first degree relatives (Father, Mother)    Allergies    No Known Allergies    Intolerances      Home Medications:  atorvastatin 20 mg oral tablet: 1 tab(s) orally once a day (at bedtime) (03 Mar 2020 10:19)  folic acid 1 mg oral tablet: 1 tab(s) orally once a day (03 Mar 2020 10:19)  Multiple Vitamins oral tablet: 1 tab(s) orally once a day (03 Mar 2020 10:19)  thiamine 100 mg oral tablet: 1 tab(s) orally once a day (03 Mar 2020 10:19)    MEDICATIONS  (STANDING):  atorvastatin 20 milliGRAM(s) Oral at bedtime  famotidine    Tablet 20 milliGRAM(s) Oral two times a day  folic acid 1 milliGRAM(s) Oral daily  lactated ringers. 1000 milliLiter(s) (500 mL/Hr) IV Continuous <Continuous>  LORazepam   Injectable   IV Push   LORazepam   Injectable 2 milliGRAM(s) IV Push every 4 hours  LORazepam   Injectable 1.5 milliGRAM(s) IV Push every 4 hours  metoprolol tartrate 25 milliGRAM(s) Oral daily  multivitamin 1 Tablet(s) Oral daily  pantoprazole  Injectable 40 milliGRAM(s) IV Push two times a day  thiamine 100 milliGRAM(s) Oral daily    MEDICATIONS  (PRN):  LORazepam   Injectable 2 milliGRAM(s) IV Push every 1 hour PRN CIWA-Ar score 8 or greater    Vital Signs Last 24 Hrs  T(C): 37.1 (06 May 2020 10:37), Max: 37.1 (05 May 2020 23:16)  T(F): 98.7 (06 May 2020 10:37), Max: 98.7 (05 May 2020 23:16)  HR: 67 (06 May 2020 10:37) (67 - 107)  BP: 109/61 (06 May 2020 10:37) (108/79 - 136/84)  BP(mean): --  RR: 18 (06 May 2020 10:37) (16 - 19)  SpO2: 98% (06 May 2020 10:37) (96% - 99%)    Daily Height in cm: 170.18 (06 May 2020 04:03)    Daily Weight in k.8 (06 May 2020 04:03)    20 @ 07:01  -  20 @ 14:49  --------------------------------------------------------  IN: 354 mL / OUT: 0 mL / NET: 354 mL      CAPILLARY BLOOD GLUCOSE        PHYSICAL EXAM:      T(C): 37.1 (20 @ 10:37), Max: 37.1 (20 @ 23:16)  HR: 67 (20 @ 10:37) (67 - 107)  BP: 109/61 (20 @ 10:37) (108/79 - 136/84)  RR: 18 (05-06-20 @ 10:37) (16 - 19)  SpO2: 98% (20 @ 10:37) (96% - 99%)  Wt(kg): --  Lungs clear   Heart S1S2  Abd soft  Extremities:   1edema                  139  |  97  |  10  ----------------------------<  77  3.9   |  24  |  0.82    Ca    9.2      05 May 2020 20:28    TPro  8.4<H>  /  Alb  3.5  /  TBili  0.5  /  DBili  x   /  AST  33  /  ALT  21  /  AlkPhos  67                            11.8   3.54  )-----------( 150      ( 05 May 2020 20:28 )             34.7     Creatinine Trend: 0.82<--, 1.11<--, 1.32<--, 1.25<--          Assessment   Etoh abuse; hx ROSA proteinuria;  Lactic acidosis likely Type 2; poor clearance   Hiccups    Plan  Supportive care  UA micro  Trial Baclofen     Misha Abebe MD

## 2020-05-06 NOTE — PROGRESS NOTE ADULT - SUBJECTIVE AND OBJECTIVE BOX
Patient is a 55y old  Male who presents with a chief complaint of epigastric pain n/v (05 May 2020 23:12), no chest pain, no SOB.       OVERNIGHT EVENTS: none    MEDICATIONS  (STANDING):  atorvastatin 20 milliGRAM(s) Oral at bedtime  famotidine    Tablet 20 milliGRAM(s) Oral two times a day  folic acid 1 milliGRAM(s) Oral daily  lactated ringers. 1000 milliLiter(s) (500 mL/Hr) IV Continuous <Continuous>  LORazepam   Injectable   IV Push   LORazepam   Injectable 2 milliGRAM(s) IV Push every 4 hours  LORazepam   Injectable 1.5 milliGRAM(s) IV Push every 4 hours  metoprolol tartrate 25 milliGRAM(s) Oral daily  multivitamin 1 Tablet(s) Oral daily  pantoprazole  Injectable 40 milliGRAM(s) IV Push two times a day  thiamine 100 milliGRAM(s) Oral daily    MEDICATIONS  (PRN):  LORazepam   Injectable 2 milliGRAM(s) IV Push every 1 hour PRN CIWA-Ar score 8 or greater        REVIEW OF SYSTEMS:  CONSTITUTIONAL: Feeling fatigue  EYES: No eye pain, visual disturbances, or discharge  ENMT:  No difficulty hearing, tinnitus, vertigo; No sinus or throat pain  NECK: No pain or stiffness  RESPIRATORY: No cough, wheezing, chills or hemoptysis; No shortness of breath  CARDIOVASCULAR: No chest pain, palpitations, dizziness, or leg swelling  GASTROINTESTINAL: No abdominal or epigastric pain. No nausea, vomiting, or hematemesis; No diarrhea or constipation. No melena or hematochezia.  GENITOURINARY: No dysuria, frequency, hematuria, or incontinence  NEUROLOGICAL: mild tremors  SKIN: No itching, burning, rashes, or lesions      Vital Signs Last 24 Hrs  T(C): 37.1 (06 May 2020 10:37), Max: 37.1 (05 May 2020 23:16)  T(F): 98.7 (06 May 2020 10:37), Max: 98.7 (05 May 2020 23:16)  HR: 67 (06 May 2020 10:37) (67 - 107)  BP: 109/61 (06 May 2020 10:37) (108/79 - 136/84)  BP(mean): --  RR: 18 (06 May 2020 10:37) (16 - 19)  SpO2: 98% (06 May 2020 10:37) (96% - 99%)    PHYSICAL EXAM:  GENERAL: NAD, well-groomed, well-developed  HEAD:  Atraumatic, Normocephalic  EYES: EOMI, PERRLA, conjunctiva and sclera clear  ENMT: No tonsillar erythema, exudates, or enlargement; Moist mucous membranes   NECK: Supple, No JVD   NERVOUS SYSTEM:  Alert & Oriented X3, Motor Strength 5/5 B/L upper and lower extremities; DTRs 2+ intact and symmetric  CHEST/LUNG: Clear to auscultation  bilaterally; No rales, rhonchi, wheezing, or rubs  HEART: Regular rate and rhythm; No murmurs, rubs, or gallops  ABDOMEN: Soft, Nontender, Nondistended; Bowel sounds present  EXTREMITIES:  2+ Peripheral Pulses, No clubbing, cyanosis, or edema  LYMPH: No lymphadenopathy noted  SKIN: No rashes or lesions    LABS:                        11.8   3.54  )-----------( 150      ( 05 May 2020 20:28 )             34.7     05-05    139  |  97  |  10  ----------------------------<  77  3.9   |  24  |  0.82    Ca    9.2      05 May 2020 20:28    TPro  8.4<H>  /  Alb  3.5  /  TBili  0.5  /  DBili  x   /  AST  33  /  ALT  21  /  AlkPhos  67  05-05       cardiac markers     CAPILLARY BLOOD GLUCOSE        Cultures    RADIOLOGY & ADDITIONAL TESTS:    Imaging Personally Reviewed:  [ ] YES  [ ] NO    Consultant(s) Notes Reviewed:  [ ] YES  [ ] NO    Care Discussed with Consultants/Other Providers [ ] YES  [ ] NO

## 2020-05-06 NOTE — PROGRESS NOTE ADULT - ASSESSMENT
54 y/o male w/PMH gerd, HLD, etoh abuse presents with complaint of  intoxication and n/v epigastric pain and poor po food intake.

## 2020-05-07 DIAGNOSIS — E87.2 ACIDOSIS: ICD-10-CM

## 2020-05-07 DIAGNOSIS — R80.9 PROTEINURIA, UNSPECIFIED: ICD-10-CM

## 2020-05-07 LAB
ANION GAP SERPL CALC-SCNC: 8 MMOL/L — SIGNIFICANT CHANGE UP (ref 5–17)
BUN SERPL-MCNC: 11 MG/DL — SIGNIFICANT CHANGE UP (ref 7–23)
CALCIUM SERPL-MCNC: 8.2 MG/DL — LOW (ref 8.5–10.1)
CHLORIDE SERPL-SCNC: 102 MMOL/L — SIGNIFICANT CHANGE UP (ref 96–108)
CO2 SERPL-SCNC: 26 MMOL/L — SIGNIFICANT CHANGE UP (ref 22–31)
CREAT SERPL-MCNC: 0.83 MG/DL — SIGNIFICANT CHANGE UP (ref 0.5–1.3)
GLUCOSE SERPL-MCNC: 80 MG/DL — SIGNIFICANT CHANGE UP (ref 70–99)
PHOSPHATE SERPL-MCNC: 3.1 MG/DL — SIGNIFICANT CHANGE UP (ref 2.5–4.5)
POTASSIUM SERPL-MCNC: 3.5 MMOL/L — SIGNIFICANT CHANGE UP (ref 3.5–5.3)
POTASSIUM SERPL-SCNC: 3.5 MMOL/L — SIGNIFICANT CHANGE UP (ref 3.5–5.3)
SODIUM SERPL-SCNC: 136 MMOL/L — SIGNIFICANT CHANGE UP (ref 135–145)

## 2020-05-07 PROCEDURE — 99233 SBSQ HOSP IP/OBS HIGH 50: CPT

## 2020-05-07 RX ORDER — POLYETHYLENE GLYCOL 3350 17 G/17G
17 POWDER, FOR SOLUTION ORAL DAILY
Refills: 0 | Status: DISCONTINUED | OUTPATIENT
Start: 2020-05-07 | End: 2020-05-10

## 2020-05-07 RX ORDER — PANTOPRAZOLE SODIUM 20 MG/1
40 TABLET, DELAYED RELEASE ORAL
Refills: 0 | Status: DISCONTINUED | OUTPATIENT
Start: 2020-05-07 | End: 2020-05-10

## 2020-05-07 RX ORDER — METOPROLOL TARTRATE 50 MG
12.5 TABLET ORAL
Refills: 0 | Status: DISCONTINUED | OUTPATIENT
Start: 2020-05-08 | End: 2020-05-10

## 2020-05-07 RX ADMIN — Medication 25 MILLIGRAM(S): at 05:33

## 2020-05-07 RX ADMIN — PANTOPRAZOLE SODIUM 40 MILLIGRAM(S): 20 TABLET, DELAYED RELEASE ORAL at 18:13

## 2020-05-07 RX ADMIN — Medication 1.5 MILLIGRAM(S): at 18:13

## 2020-05-07 RX ADMIN — Medication 1.5 MILLIGRAM(S): at 15:14

## 2020-05-07 RX ADMIN — POLYETHYLENE GLYCOL 3350 17 GRAM(S): 17 POWDER, FOR SOLUTION ORAL at 14:55

## 2020-05-07 RX ADMIN — Medication 100 MILLIGRAM(S): at 10:16

## 2020-05-07 RX ADMIN — FAMOTIDINE 20 MILLIGRAM(S): 10 INJECTION INTRAVENOUS at 05:33

## 2020-05-07 RX ADMIN — Medication 1.5 MILLIGRAM(S): at 10:15

## 2020-05-07 RX ADMIN — PANTOPRAZOLE SODIUM 40 MILLIGRAM(S): 20 TABLET, DELAYED RELEASE ORAL at 05:33

## 2020-05-07 RX ADMIN — Medication 1.5 MILLIGRAM(S): at 05:33

## 2020-05-07 RX ADMIN — ATORVASTATIN CALCIUM 20 MILLIGRAM(S): 80 TABLET, FILM COATED ORAL at 22:02

## 2020-05-07 RX ADMIN — Medication 1 TABLET(S): at 10:16

## 2020-05-07 RX ADMIN — Medication 1.5 MILLIGRAM(S): at 01:34

## 2020-05-07 RX ADMIN — Medication 1 MILLIGRAM(S): at 10:16

## 2020-05-07 RX ADMIN — Medication 10 MILLIGRAM(S): at 05:34

## 2020-05-07 RX ADMIN — Medication 1 MILLIGRAM(S): at 22:02

## 2020-05-07 NOTE — PROGRESS NOTE ADULT - SUBJECTIVE AND OBJECTIVE BOX
Patient is a 55y old  Male who presents with a chief complaint of epigastric pain n/v (06 May 2020 14:48), has no chest pain, no SOB, no abdominal pain.       OVERNIGHT EVENTS: none    MEDICATIONS  (STANDING):  atorvastatin 20 milliGRAM(s) Oral at bedtime  famotidine    Tablet 20 milliGRAM(s) Oral two times a day  folic acid 1 milliGRAM(s) Oral daily  lactated ringers. 1000 milliLiter(s) (500 mL/Hr) IV Continuous <Continuous>  LORazepam   Injectable   IV Push   LORazepam   Injectable 1.5 milliGRAM(s) IV Push every 4 hours  LORazepam   Injectable 1 milliGRAM(s) IV Push every 4 hours  metoprolol tartrate 25 milliGRAM(s) Oral daily  multivitamin 1 Tablet(s) Oral daily  pantoprazole  Injectable 40 milliGRAM(s) IV Push two times a day  thiamine 100 milliGRAM(s) Oral daily    MEDICATIONS  (PRN):  LORazepam   Injectable 2 milliGRAM(s) IV Push every 1 hour PRN CIWA-Ar score 8 or greater        REVIEW OF SYSTEMS:  CONSTITUTIONAL: No fever,   EYES: No eye pain, visual disturbances, or discharge  ENMT:   No sinus or throat pain  NECK: No pain or stiffness  RESPIRATORY: No cough,  No shortness of breath  CARDIOVASCULAR: No chest pain, palpitations, dizziness, or leg swelling  GASTROINTESTINAL: No abdominal or epigastric pain. No nausea, vomiting, or hematemesis;   GENITOURINARY: No dysuria, frequency, hematuria, or incontinence  NEUROLOGICAL: No headaches,    SKIN: No itching,       Vital Signs Last 24 Hrs  T(C): 36.7 (07 May 2020 10:27), Max: 37.1 (06 May 2020 16:45)  T(F): 98 (07 May 2020 10:27), Max: 98.7 (06 May 2020 16:45)  HR: 67 (07 May 2020 10:27) (63 - 71)  BP: 125/79 (07 May 2020 10:27) (113/85 - 130/80)  BP(mean): --  RR: 18 (07 May 2020 10:27) (16 - 18)  SpO2: 97% (07 May 2020 10:27) (96% - 99%)    PHYSICAL EXAM:  GENERAL: NAD, well-groomed, well-developed  HEAD:  Atraumatic, Normocephalic  EYES:  Conjunctiva and sclera clear  ENMT:  Moist mucous membranes   NECK: Supple, No JVD   NERVOUS SYSTEM:  Alert & Oriented X 3, Motor Strength 5/5 B/L upper and lower extremities;    CHEST/LUNG: Clear to auscultation  bilaterally; No rales, rhonchi, wheezing, or rubs  HEART: Regular rate and rhythm; No murmurs, rubs, or gallops  ABDOMEN: Soft, Nontender, Nondistended; Bowel sounds present  EXTREMITIES:  2+ Peripheral Pulses, No clubbing, cyanosis, or edema  LYMPH: No lymphadenopathy noted  SKIN: No rashes or lesions    LABS:                        11.8   3.54  )-----------( 150      ( 05 May 2020 20:28 )             34.7     05-07    136  |  102  |  11  ----------------------------<  80  3.5   |  26  |  0.83    Ca    8.2<L>      07 May 2020 06:14  Phos  3.1     05-07    TPro  8.4<H>  /  Alb  3.5  /  TBili  0.5  /  DBili  x   /  AST  33  /  ALT  21  /  AlkPhos  67  05-05       cardiac markers   Urinalysis Basic - ( 06 May 2020 18:36 )    Color: Yellow / Appearance: Clear / S.010 / pH: x  Gluc: x / Ketone: Small  / Bili: Negative / Urobili: Negative mg/dL   Blood: x / Protein: 15 mg/dL / Nitrite: Negative   Leuk Esterase: Negative / RBC: Negative /HPF / WBC Negative   Sq Epi: x / Non Sq Epi: x / Bacteria: Negative      CAPILLARY BLOOD GLUCOSE        Cultures    RADIOLOGY & ADDITIONAL TESTS:    Imaging Personally Reviewed:  [ ] YES  [ ] NO    Consultant(s) Notes Reviewed:  [ ] YES  [ ] NO    Care Discussed with Consultants/Other Providers [ ] YES  [ ] NO

## 2020-05-08 PROCEDURE — 99233 SBSQ HOSP IP/OBS HIGH 50: CPT

## 2020-05-08 RX ADMIN — Medication 1 MILLIGRAM(S): at 11:07

## 2020-05-08 RX ADMIN — Medication 1 MILLIGRAM(S): at 17:21

## 2020-05-08 RX ADMIN — ATORVASTATIN CALCIUM 20 MILLIGRAM(S): 80 TABLET, FILM COATED ORAL at 21:44

## 2020-05-08 RX ADMIN — Medication 1 TABLET(S): at 11:07

## 2020-05-08 RX ADMIN — Medication 100 MILLIGRAM(S): at 11:07

## 2020-05-08 RX ADMIN — Medication 1 MILLIGRAM(S): at 06:02

## 2020-05-08 RX ADMIN — Medication 1 MILLIGRAM(S): at 09:48

## 2020-05-08 RX ADMIN — Medication 2 MILLIGRAM(S): at 12:40

## 2020-05-08 RX ADMIN — Medication 12.5 MILLIGRAM(S): at 17:22

## 2020-05-08 RX ADMIN — Medication 1 MILLIGRAM(S): at 02:18

## 2020-05-08 RX ADMIN — Medication 12.5 MILLIGRAM(S): at 06:02

## 2020-05-08 RX ADMIN — Medication 0.5 MILLIGRAM(S): at 21:44

## 2020-05-08 RX ADMIN — PANTOPRAZOLE SODIUM 40 MILLIGRAM(S): 20 TABLET, DELAYED RELEASE ORAL at 06:02

## 2020-05-08 NOTE — PROGRESS NOTE ADULT - SUBJECTIVE AND OBJECTIVE BOX
Patient is a 55y old  Male who presents with a chief complaint of epigastric pain n/v (07 May 2020 11:36), no chest pain, no abdominal pain.       OVERNIGHT EVENTS: none    MEDICATIONS  (STANDING):  atorvastatin 20 milliGRAM(s) Oral at bedtime  folic acid 1 milliGRAM(s) Oral daily  lactated ringers. 1000 milliLiter(s) (500 mL/Hr) IV Continuous <Continuous>  LORazepam   Injectable   IV Push   LORazepam   Injectable 1 milliGRAM(s) IV Push every 4 hours  LORazepam   Injectable 0.5 milliGRAM(s) IV Push every 4 hours  metoprolol tartrate 12.5 milliGRAM(s) Oral two times a day  multivitamin 1 Tablet(s) Oral daily  pantoprazole    Tablet 40 milliGRAM(s) Oral before breakfast  polyethylene glycol 3350 17 Gram(s) Oral daily  thiamine 100 milliGRAM(s) Oral daily    MEDICATIONS  (PRN):  LORazepam   Injectable 2 milliGRAM(s) IV Push every 1 hour PRN CIWA-Ar score 8 or greater        REVIEW OF SYSTEMS:  CONSTITUTIONAL: No fever, weight loss, or fatigue  EYES: No eye pain, visual disturbances, or discharge  ENMT:  No difficulty hearing, tinnitus, vertigo; No sinus or throat pain  NECK: No pain or stiffness  RESPIRATORY: No cough, wheezing, chills or hemoptysis; No shortness of breath  CARDIOVASCULAR: No chest pain, palpitations, dizziness, or leg swelling  GASTROINTESTINAL: No abdominal or epigastric pain. No nausea, vomiting, or hematemesis; No diarrhea or constipation. No melena or hematochezia.  GENITOURINARY: No dysuria, frequency, hematuria, or incontinence  NEUROLOGICAL: No headaches, memory loss, loss of strength, numbness, or tremors  SKIN: No itching, burning, rashes, or lesions      Vital Signs Last 24 Hrs  T(C): 36.8 (08 May 2020 11:45), Max: 36.9 (07 May 2020 15:58)  T(F): 98.3 (08 May 2020 11:45), Max: 98.5 (07 May 2020 15:58)  HR: 89 (08 May 2020 11:45) (58 - 89)  BP: 104/70 (08 May 2020 11:45) (104/70 - 134/82)  BP(mean): --  RR: 18 (08 May 2020 11:45) (16 - 18)  SpO2: 99% (08 May 2020 11:45) (97% - 99%)    PHYSICAL EXAM:  GENERAL: NAD, well-groomed, well-developed  HEAD:  Atraumatic, Normocephalic  EYES: EOMI, PERRLA, conjunctiva and sclera clear  ENMT: No tonsillar erythema, exudates, or enlargement; Moist mucous membranes   NECK: Supple, No JVD   NERVOUS SYSTEM:  Alert & Oriented X3, Good concentration; Motor Strength 5/5 B/L upper and lower extremities; DTRs 2+ intact and symmetric  CHEST/LUNG: Clear to auscultation  bilaterally; No rales, rhonchi, wheezing, or rubs  HEART: Regular rate and rhythm; No murmurs, rubs, or gallops  ABDOMEN: Soft, Nontender, Nondistended; Bowel sounds present  EXTREMITIES:  2+ Peripheral Pulses, No clubbing, cyanosis, or edema  LYMPH: No lymphadenopathy noted  SKIN: No rashes or lesions    LABS:        136  |  102  |  11  ----------------------------<  80  3.5   |  26  |  0.83    Ca    8.2<L>      07 May 2020 06:14  Phos  3.1     05-07         cardiac markers   Urinalysis Basic - ( 06 May 2020 18:36 )    Color: Yellow / Appearance: Clear / S.010 / pH: x  Gluc: x / Ketone: Small  / Bili: Negative / Urobili: Negative mg/dL   Blood: x / Protein: 15 mg/dL / Nitrite: Negative   Leuk Esterase: Negative / RBC: Negative /HPF / WBC Negative   Sq Epi: x / Non Sq Epi: x / Bacteria: Negative      CAPILLARY BLOOD GLUCOSE        Cultures    RADIOLOGY & ADDITIONAL TESTS:    Imaging Personally Reviewed:  [ ] YES  [ ] NO    Consultant(s) Notes Reviewed:  [ ] YES  [ ] NO    Care Discussed with Consultants/Other Providers [ ] YES  [ ] NO

## 2020-05-08 NOTE — PROGRESS NOTE ADULT - PROBLEM SELECTOR PLAN 1
- continue CIWA protocols.  - Still score on CIWA, last score was 6.  - Cont MVI, folate, thiamine  - Drinking cessation strongly advised.  - D/c planning

## 2020-05-09 PROCEDURE — 99232 SBSQ HOSP IP/OBS MODERATE 35: CPT

## 2020-05-09 RX ORDER — HEPARIN SODIUM 5000 [USP'U]/ML
5000 INJECTION INTRAVENOUS; SUBCUTANEOUS EVERY 8 HOURS
Refills: 0 | Status: DISCONTINUED | OUTPATIENT
Start: 2020-05-09 | End: 2020-05-10

## 2020-05-09 RX ORDER — ACETAMINOPHEN 500 MG
650 TABLET ORAL ONCE
Refills: 0 | Status: COMPLETED | OUTPATIENT
Start: 2020-05-09 | End: 2020-05-09

## 2020-05-09 RX ADMIN — Medication 0.5 MILLIGRAM(S): at 10:37

## 2020-05-09 RX ADMIN — PANTOPRAZOLE SODIUM 40 MILLIGRAM(S): 20 TABLET, DELAYED RELEASE ORAL at 07:33

## 2020-05-09 RX ADMIN — Medication 1 MILLIGRAM(S): at 12:42

## 2020-05-09 RX ADMIN — Medication 0.5 MILLIGRAM(S): at 18:16

## 2020-05-09 RX ADMIN — Medication 100 MILLIGRAM(S): at 12:42

## 2020-05-09 RX ADMIN — POLYETHYLENE GLYCOL 3350 17 GRAM(S): 17 POWDER, FOR SOLUTION ORAL at 12:43

## 2020-05-09 RX ADMIN — ATORVASTATIN CALCIUM 20 MILLIGRAM(S): 80 TABLET, FILM COATED ORAL at 21:47

## 2020-05-09 RX ADMIN — Medication 1 TABLET(S): at 12:42

## 2020-05-09 RX ADMIN — Medication 0.5 MILLIGRAM(S): at 01:49

## 2020-05-09 RX ADMIN — Medication 12.5 MILLIGRAM(S): at 07:33

## 2020-05-09 RX ADMIN — Medication 650 MILLIGRAM(S): at 22:42

## 2020-05-09 RX ADMIN — Medication 0.5 MILLIGRAM(S): at 07:33

## 2020-05-09 RX ADMIN — Medication 30 MILLILITER(S): at 13:28

## 2020-05-09 RX ADMIN — HEPARIN SODIUM 5000 UNIT(S): 5000 INJECTION INTRAVENOUS; SUBCUTANEOUS at 13:14

## 2020-05-09 RX ADMIN — Medication 12.5 MILLIGRAM(S): at 18:16

## 2020-05-09 RX ADMIN — Medication 0.5 MILLIGRAM(S): at 13:23

## 2020-05-09 RX ADMIN — HEPARIN SODIUM 5000 UNIT(S): 5000 INJECTION INTRAVENOUS; SUBCUTANEOUS at 21:47

## 2020-05-09 NOTE — PROGRESS NOTE ADULT - SUBJECTIVE AND OBJECTIVE BOX
Patient is a 55y old  Male who presents with a chief complaint of epigastric pain n/v (08 May 2020 14:02), he has no pain      MEDICATIONS  (STANDING):  atorvastatin 20 milliGRAM(s) Oral at bedtime  folic acid 1 milliGRAM(s) Oral daily  heparin   Injectable 5000 Unit(s) SubCutaneous every 8 hours  lactated ringers. 1000 milliLiter(s) (500 mL/Hr) IV Continuous <Continuous>  LORazepam   Injectable   IV Push   LORazepam   Injectable 0.5 milliGRAM(s) IV Push every 4 hours  metoprolol tartrate 12.5 milliGRAM(s) Oral two times a day  multivitamin 1 Tablet(s) Oral daily  pantoprazole    Tablet 40 milliGRAM(s) Oral before breakfast  polyethylene glycol 3350 17 Gram(s) Oral daily  thiamine 100 milliGRAM(s) Oral daily    MEDICATIONS  (PRN):  LORazepam   Injectable 2 milliGRAM(s) IV Push every 1 hour PRN CIWA-Ar score 8 or greater        REVIEW OF SYSTEMS:  12 systems reviewed and negative.     Vital Signs Last 24 Hrs  T(C): 36.4 (09 May 2020 10:23), Max: 36.8 (08 May 2020 15:20)  T(F): 97.6 (09 May 2020 10:23), Max: 98.3 (08 May 2020 15:20)  HR: 79 (09 May 2020 10:23) (59 - 79)  BP: 113/80 (09 May 2020 10:23) (113/80 - 125/79)  BP(mean): --  RR: 18 (09 May 2020 10:23) (16 - 18)  SpO2: 97% (09 May 2020 10:23) (97% - 100%)    PHYSICAL EXAM:  GENERAL: NAD, well-groomed, well-developed  HEAD:  Atraumatic, Normocephalic  EYES: EOMI, PERRLA, conjunctiva and sclera clear  ENMT: No tonsillar erythema, exudates, or enlargement; Moist mucous membranes   NECK: Supple, No JVD   NERVOUS SYSTEM:  Alert & Oriented X 3, some tremors noted on exam,  Motor Strength 5/5 B/L upper and lower extremities;   CHEST/LUNG: Clear to auscultation  bilaterally; No rales, rhonchi, wheezing, or rubs  HEART: Regular rate and rhythm; No murmurs, rubs, or gallops  ABDOMEN: Soft, Nontender, Nondistended; Bowel sounds present  EXTREMITIES:  2+ Peripheral Pulses, No clubbing, cyanosis, or edema  LYMPH: No lymphadenopathy noted  SKIN: No rashes or lesions    LABS:             cardiac markers     CAPILLARY BLOOD GLUCOSE        Cultures    RADIOLOGY & ADDITIONAL TESTS:    Imaging Personally Reviewed:  [ ] YES  [ ] NO    Consultant(s) Notes Reviewed:  [ ] YES  [ ] NO    Care Discussed with Consultants/Other Providers [ ] YES  [ ] NO

## 2020-05-09 NOTE — PROGRESS NOTE ADULT - PROBLEM SELECTOR PLAN 1
- Mild tremors noted on exam.  - continue CIWA protocols.  - Cont MVI, folate, thiamine  - Drinking cessation strongly advised.  - D/c planning for AM

## 2020-05-10 ENCOUNTER — TRANSCRIPTION ENCOUNTER (OUTPATIENT)
Age: 55
End: 2020-05-10

## 2020-05-10 VITALS
OXYGEN SATURATION: 100 % | SYSTOLIC BLOOD PRESSURE: 110 MMHG | DIASTOLIC BLOOD PRESSURE: 70 MMHG | HEART RATE: 71 BPM | TEMPERATURE: 98 F | RESPIRATION RATE: 18 BRPM

## 2020-05-10 LAB
ANION GAP SERPL CALC-SCNC: 10 MMOL/L — SIGNIFICANT CHANGE UP (ref 5–17)
BUN SERPL-MCNC: 13 MG/DL — SIGNIFICANT CHANGE UP (ref 7–23)
CALCIUM SERPL-MCNC: 8.6 MG/DL — SIGNIFICANT CHANGE UP (ref 8.5–10.1)
CHLORIDE SERPL-SCNC: 106 MMOL/L — SIGNIFICANT CHANGE UP (ref 96–108)
CO2 SERPL-SCNC: 22 MMOL/L — SIGNIFICANT CHANGE UP (ref 22–31)
CREAT SERPL-MCNC: 0.86 MG/DL — SIGNIFICANT CHANGE UP (ref 0.5–1.3)
GLUCOSE SERPL-MCNC: 82 MG/DL — SIGNIFICANT CHANGE UP (ref 70–99)
HCT VFR BLD CALC: 30.9 % — LOW (ref 39–50)
HGB BLD-MCNC: 10.4 G/DL — LOW (ref 13–17)
MCHC RBC-ENTMCNC: 28.2 PG — SIGNIFICANT CHANGE UP (ref 27–34)
MCHC RBC-ENTMCNC: 33.7 GM/DL — SIGNIFICANT CHANGE UP (ref 32–36)
MCV RBC AUTO: 83.7 FL — SIGNIFICANT CHANGE UP (ref 80–100)
NRBC # BLD: 0 /100 WBCS — SIGNIFICANT CHANGE UP (ref 0–0)
PLATELET # BLD AUTO: 147 K/UL — LOW (ref 150–400)
POTASSIUM SERPL-MCNC: 3.5 MMOL/L — SIGNIFICANT CHANGE UP (ref 3.5–5.3)
POTASSIUM SERPL-SCNC: 3.5 MMOL/L — SIGNIFICANT CHANGE UP (ref 3.5–5.3)
RBC # BLD: 3.69 M/UL — LOW (ref 4.2–5.8)
RBC # FLD: 15.6 % — HIGH (ref 10.3–14.5)
SODIUM SERPL-SCNC: 138 MMOL/L — SIGNIFICANT CHANGE UP (ref 135–145)
WBC # BLD: 3.01 K/UL — LOW (ref 3.8–10.5)
WBC # FLD AUTO: 3.01 K/UL — LOW (ref 3.8–10.5)

## 2020-05-10 PROCEDURE — 99233 SBSQ HOSP IP/OBS HIGH 50: CPT

## 2020-05-10 RX ADMIN — Medication 1 TABLET(S): at 11:41

## 2020-05-10 RX ADMIN — PANTOPRAZOLE SODIUM 40 MILLIGRAM(S): 20 TABLET, DELAYED RELEASE ORAL at 05:44

## 2020-05-10 RX ADMIN — Medication 12.5 MILLIGRAM(S): at 05:44

## 2020-05-10 RX ADMIN — Medication 1 MILLIGRAM(S): at 11:41

## 2020-05-10 RX ADMIN — HEPARIN SODIUM 5000 UNIT(S): 5000 INJECTION INTRAVENOUS; SUBCUTANEOUS at 05:44

## 2020-05-10 RX ADMIN — Medication 0.5 MILLIGRAM(S): at 05:56

## 2020-05-10 RX ADMIN — Medication 100 MILLIGRAM(S): at 11:41

## 2020-05-10 NOTE — DISCHARGE NOTE PROVIDER - NSDCCPCAREPLAN_GEN_ALL_CORE_FT
PRINCIPAL DISCHARGE DIAGNOSIS  Diagnosis: Alcohol withdrawal  Assessment and Plan of Treatment:       SECONDARY DISCHARGE DIAGNOSES  Diagnosis: Proteinuria  Assessment and Plan of Treatment:     Diagnosis: Lactic acid acidosis  Assessment and Plan of Treatment:     Diagnosis: Alcoholic gastritis  Assessment and Plan of Treatment:

## 2020-05-10 NOTE — DISCHARGE NOTE NURSING/CASE MANAGEMENT/SOCIAL WORK - PATIENT PORTAL LINK FT
You can access the FollowMyHealth Patient Portal offered by [facility] by registering at the following website: [email or web address]. By joining [employer]’s FollowMyHealth portal, you will also be able to view your health information using other applications (apps) compatible with our system.

## 2020-05-10 NOTE — DISCHARGE NOTE PROVIDER - HOSPITAL COURSE
54 y/o male w/PMH gerd, HLD, etoh abuse presents with complaint of  abdominal pain and n/v.  Pt states he has been drinking a lot  forl ast week  and has some abdominal discomfort in epigastric area and n/v unable to tolerate and keep down food, no hematemesis or melena or brbpr.   He was admitted for ETOH abuse, poss withdrawal., abdominal pain.   He was placed on CIWA protocols, and PPI started.  He was evaluated by renal for protein in the urine.  At present, patient is stable and will be discharged home.         Problem/Plan - 1:    ·  Problem: Alcohol withdrawal syndrome without complication.  Plan: - Mild tremors noted on exam.    - continue CIWA protocols.    - Cont MVI, folate, thiamine    - Drinking cessation strongly advised.    - D/c planning for today.          Problem/Plan - 2:    ·  Problem: Acute alcoholic gastritis without hemorrhage.  Plan: - GERD,    - on protonix.          Problem/Plan - 3:    ·  Problem: Mixed hyperlipidemia.  Plan: cont statin.          Problem/Plan - 4:    ·  Problem: Essential hypertension.  Plan: cont lopressor.          Problem/Plan - 5:    ·  Problem: Proteinuria, unspecified type.  Plan: - renal is following.          Problem/Plan - 6:    Problem: Lactic acidosis. Plan: - likely secondary to ETOH abuse.    - improving.         Problem/Plan - 7:    ·  Problem: Preventive measure.  Plan: scd.         It took 40 minutes to discharge the patient.

## 2020-05-10 NOTE — DISCHARGE NOTE PROVIDER - CARE PROVIDER_API CALL
Misha Abebe  Nephrology  [address] [id number]  Phone: [phone]  Fax: [phone]  Follow Up Time: 1 week    PCP in one week,   Phone: (   )    -  Fax: (   )    -  Follow Up Time:

## 2020-05-10 NOTE — DISCHARGE NOTE PROVIDER - PROVIDER TOKENS
PROVIDER:[TOKEN:[[id number]],FOLLOWUP:[1 week]],FREE:[LAST:[PCP in one week],PHONE:[(   )    -],FAX:[(   )    -]]

## 2020-05-12 DIAGNOSIS — K29.20 ALCOHOLIC GASTRITIS WITHOUT BLEEDING: ICD-10-CM

## 2020-05-12 DIAGNOSIS — E86.0 DEHYDRATION: ICD-10-CM

## 2020-05-12 DIAGNOSIS — R11.2 NAUSEA WITH VOMITING, UNSPECIFIED: ICD-10-CM

## 2020-05-12 DIAGNOSIS — F10.239 ALCOHOL DEPENDENCE WITH WITHDRAWAL, UNSPECIFIED: ICD-10-CM

## 2020-05-12 DIAGNOSIS — E78.2 MIXED HYPERLIPIDEMIA: ICD-10-CM

## 2020-05-12 DIAGNOSIS — I10 ESSENTIAL (PRIMARY) HYPERTENSION: ICD-10-CM

## 2020-05-12 DIAGNOSIS — Z11.59 ENCOUNTER FOR SCREENING FOR OTHER VIRAL DISEASES: ICD-10-CM

## 2020-05-12 DIAGNOSIS — R80.9 PROTEINURIA, UNSPECIFIED: ICD-10-CM

## 2020-05-12 DIAGNOSIS — E87.2 ACIDOSIS: ICD-10-CM

## 2020-05-12 DIAGNOSIS — K21.9 GASTRO-ESOPHAGEAL REFLUX DISEASE WITHOUT ESOPHAGITIS: ICD-10-CM

## 2020-05-13 ENCOUNTER — EMERGENCY (EMERGENCY)
Facility: HOSPITAL | Age: 55
LOS: 0 days | Discharge: HOME | End: 2020-05-14
Attending: EMERGENCY MEDICINE
Payer: MEDICAID

## 2020-05-13 VITALS
HEIGHT: 67 IN | DIASTOLIC BLOOD PRESSURE: 80 MMHG | OXYGEN SATURATION: 99 % | RESPIRATION RATE: 16 BRPM | SYSTOLIC BLOOD PRESSURE: 129 MMHG | HEART RATE: 98 BPM | TEMPERATURE: 98 F | WEIGHT: 139.99 LBS

## 2020-05-13 PROCEDURE — 93010 ELECTROCARDIOGRAM REPORT: CPT

## 2020-05-13 PROCEDURE — 99285 EMERGENCY DEPT VISIT HI MDM: CPT

## 2020-05-13 RX ORDER — FAMOTIDINE 10 MG/ML
20 INJECTION INTRAVENOUS ONCE
Refills: 0 | Status: DISCONTINUED | OUTPATIENT
Start: 2020-05-13 | End: 2020-05-14

## 2020-05-13 RX ORDER — ACETAMINOPHEN 500 MG
975 TABLET ORAL ONCE
Refills: 0 | Status: COMPLETED | OUTPATIENT
Start: 2020-05-13 | End: 2020-05-14

## 2020-05-13 NOTE — ED ADULT NURSE NOTE - OBJECTIVE STATEMENT
known alcoholic brought in for intox. pt is alert x 4 with steady gait C/o ABD pain. no withdrawal symptoms noted.

## 2020-05-13 NOTE — ED PROVIDER NOTE - PROVIDER TOKENS
PROVIDER:[TOKEN:[[id number]],FOLLOWUP:[1-3 Days]],PROVIDER:[TOKEN:[[id number]],FOLLOWUP:[4-6 Days]]

## 2020-05-13 NOTE — ED PROVIDER NOTE - PHYSICAL EXAMINATION
Vitals: WNL  Gen: AAOx3, NAD, sitting comfortably in stretcher, calm, cooperative, non-toxic   Head: ncat, perrla, eomi b/l  Neck: supple, no lymphadenopathy, no midline deviation  Heart: rrr, no m/r/g  Lungs: CTA b/l, no rales/ronchi/wheezes  Abd: soft, tender in epigastrium, non-distended, no rebound or guarding  Ext: no clubbing/cyanosis/edema  Neuro: sensation and muscle strength intact b/l, steady gait

## 2020-05-13 NOTE — ED PROVIDER NOTE - OBJECTIVE STATEMENT
56 yo M with epigastric pain for days, says it feels like his stomach.  Pt. admits to heavy drinking yesterday, was vomiting heavily, thinks that set it off.  No other complaints.  No radiation of pain, rated 7/10.    ROS: negative for fever, cough, headache, chest pain, shortness of breath, nausea, vomiting, diarrhea, rash, paresthesia, and weakness--all other systems reviewed are negative.   PMH: gerd, HLD, etoh abuse; Meds: Forgets names; SH: Denies smoking/drinking/drug use

## 2020-05-13 NOTE — ED PROVIDER NOTE - CLINICAL SUMMARY MEDICAL DECISION MAKING FREE TEXT BOX
54 yo M with likely alcoholic gastritis, doubt pancreatitis, ischemia  -ekg, tylenol for pain, iv, basic labs, coags, etoh, lactate, lipase  -f/u results, reeval

## 2020-05-13 NOTE — ED PROVIDER NOTE - PATIENT PORTAL LINK FT
You can access the FollowMyHealth Patient Portal offered by [facility] by registering at the following website: [email or web address]. By joining [facility]’s FollowMyHealth portal, you will also be able to view your health information using other applications (apps) compatible with our system.

## 2020-05-13 NOTE — ED PROVIDER NOTE - PROGRESS NOTE DETAILS
Results reported to patient--grossly benign, labs show elevated lactate, elevated etoh  Pt. reports feeling better after stay, clinically sober, ambulating with steady gait in ER, demonstrates decision-making capacity  pt. agrees to f/u with primary care outpt., asap, referred to GI for f/u as well, counseled pt. on alcohol abuse while in ER  pt. understands to return to ED if symptoms worsen; will d/c Results reported to patient--grossly benign, labs show elevated lactate (frequently elevated from dehydration/vomiting/etoh abuse), elevated etoh  Pt. reports feeling better after stay, clinically sober, ambulating with steady gait in ER, demonstrates decision-making capacity  pt. agrees to f/u with primary care outpt., asap, referred to GI for f/u as well, counseled pt. on alcohol abuse while in ER  pt. understands to return to ED if symptoms worsen; will d/c

## 2020-05-13 NOTE — ED PROVIDER NOTE - CARE PROVIDER_API CALL
Azeem Finn  MEDICINE  [address] [place] [address]  Phone: [phone]  Fax: [phone]  Follow Up Time: 1-3 Days    Miko Pelayo  INTERNAL MEDICINE  [address] [place] [address]  Phone: [phone]  Fax: [phone]  Follow Up Time: 4-6 Days

## 2020-05-14 DIAGNOSIS — K21.9 GASTRO-ESOPHAGEAL REFLUX DISEASE WITHOUT ESOPHAGITIS: ICD-10-CM
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
## 2020-05-14 DIAGNOSIS — R10.13 EPIGASTRIC PAIN: ICD-10-CM

## 2020-05-14 DIAGNOSIS — K29.20 ALCOHOLIC GASTRITIS WITHOUT BLEEDING: ICD-10-CM

## 2020-05-14 DIAGNOSIS — E78.5 HYPERLIPIDEMIA, UNSPECIFIED: ICD-10-CM

## 2020-05-14 DIAGNOSIS — Z79.899 OTHER LONG TERM (CURRENT) DRUG THERAPY: ICD-10-CM

## 2020-05-14 LAB
ALBUMIN SERPL ELPH-MCNC: 3.6 G/DL — SIGNIFICANT CHANGE UP (ref 3.3–5)
ALP SERPL-CCNC: 49 U/L — SIGNIFICANT CHANGE UP (ref 40–120)
ALT FLD-CCNC: 31 U/L — SIGNIFICANT CHANGE UP (ref 12–78)
ANION GAP SERPL CALC-SCNC: 15 MMOL/L — SIGNIFICANT CHANGE UP (ref 5–17)
APTT BLD: 26.9 SEC — LOW (ref 27.5–36.3)
AST SERPL-CCNC: 24 U/L — SIGNIFICANT CHANGE UP (ref 15–37)
BASOPHILS # BLD AUTO: 0.03 K/UL — SIGNIFICANT CHANGE UP (ref 0–0.2)
BASOPHILS NFR BLD AUTO: 1 % — SIGNIFICANT CHANGE UP (ref 0–2)
BILIRUB SERPL-MCNC: 0.1 MG/DL — LOW (ref 0.2–1.2)
BUN SERPL-MCNC: 14 MG/DL — SIGNIFICANT CHANGE UP (ref 7–23)
CALCIUM SERPL-MCNC: 8.3 MG/DL — LOW (ref 8.5–10.1)
CHLORIDE SERPL-SCNC: 113 MMOL/L — HIGH (ref 96–108)
CO2 SERPL-SCNC: 21 MMOL/L — LOW (ref 22–31)
CREAT SERPL-MCNC: 0.8 MG/DL — SIGNIFICANT CHANGE UP (ref 0.5–1.3)
EOSINOPHIL # BLD AUTO: 0.05 K/UL — SIGNIFICANT CHANGE UP (ref 0–0.5)
EOSINOPHIL NFR BLD AUTO: 1.7 % — SIGNIFICANT CHANGE UP (ref 0–6)
ETHANOL SERPL-MCNC: 320 MG/DL — HIGH (ref 0–10)
GLUCOSE SERPL-MCNC: 85 MG/DL — SIGNIFICANT CHANGE UP (ref 70–99)
HCT VFR BLD CALC: 35.7 % — LOW (ref 39–50)
HGB BLD-MCNC: 11.5 G/DL — LOW (ref 13–17)
IMM GRANULOCYTES NFR BLD AUTO: 0.3 % — SIGNIFICANT CHANGE UP (ref 0–1.5)
INR BLD: 0.99 RATIO — SIGNIFICANT CHANGE UP (ref 0.88–1.16)
LACTATE SERPL-SCNC: 5.3 MMOL/L — CRITICAL HIGH (ref 0.7–2)
LIDOCAIN IGE QN: 356 U/L — SIGNIFICANT CHANGE UP (ref 73–393)
LYMPHOCYTES # BLD AUTO: 1.59 K/UL — SIGNIFICANT CHANGE UP (ref 1–3.3)
LYMPHOCYTES # BLD AUTO: 52.6 % — HIGH (ref 13–44)
MCHC RBC-ENTMCNC: 28 PG — SIGNIFICANT CHANGE UP (ref 27–34)
MCHC RBC-ENTMCNC: 32.2 GM/DL — SIGNIFICANT CHANGE UP (ref 32–36)
MCV RBC AUTO: 86.9 FL — SIGNIFICANT CHANGE UP (ref 80–100)
MONOCYTES # BLD AUTO: 0.2 K/UL — SIGNIFICANT CHANGE UP (ref 0–0.9)
MONOCYTES NFR BLD AUTO: 6.6 % — SIGNIFICANT CHANGE UP (ref 2–14)
NEUTROPHILS # BLD AUTO: 1.14 K/UL — LOW (ref 1.8–7.4)
NEUTROPHILS NFR BLD AUTO: 37.8 % — LOW (ref 43–77)
NRBC # BLD: 0 /100 WBCS — SIGNIFICANT CHANGE UP (ref 0–0)
PLATELET # BLD AUTO: 216 K/UL — SIGNIFICANT CHANGE UP (ref 150–400)
POTASSIUM SERPL-MCNC: 3.6 MMOL/L — SIGNIFICANT CHANGE UP (ref 3.5–5.3)
POTASSIUM SERPL-SCNC: 3.6 MMOL/L — SIGNIFICANT CHANGE UP (ref 3.5–5.3)
PROT SERPL-MCNC: 8.4 GM/DL — HIGH (ref 6–8.3)
PROTHROM AB SERPL-ACNC: 11 SEC — SIGNIFICANT CHANGE UP (ref 10–12.9)
RBC # BLD: 4.11 M/UL — LOW (ref 4.2–5.8)
RBC # FLD: 17.1 % — HIGH (ref 10.3–14.5)
SODIUM SERPL-SCNC: 149 MMOL/L — HIGH (ref 135–145)
WBC # BLD: 3.02 K/UL — LOW (ref 3.8–10.5)
WBC # FLD AUTO: 3.02 K/UL — LOW (ref 3.8–10.5)

## 2020-05-14 RX ORDER — FAMOTIDINE 10 MG/ML
1 INJECTION INTRAVENOUS
Qty: 14 | Refills: 0
Start: 2020-05-14 | End: 2020-05-20

## 2020-05-14 RX ORDER — SODIUM CHLORIDE 9 MG/ML
2000 INJECTION INTRAMUSCULAR; INTRAVENOUS; SUBCUTANEOUS ONCE
Refills: 0 | Status: DISCONTINUED | OUTPATIENT
Start: 2020-05-14 | End: 2020-05-14

## 2020-05-14 RX ADMIN — Medication 975 MILLIGRAM(S): at 01:25

## 2020-05-19 ENCOUNTER — INPATIENT (INPATIENT)
Facility: HOSPITAL | Age: 55
LOS: 7 days | Discharge: HOME | End: 2020-05-27
Attending: INTERNAL MEDICINE | Admitting: INTERNAL MEDICINE
Payer: MEDICAID

## 2020-05-19 VITALS
DIASTOLIC BLOOD PRESSURE: 102 MMHG | RESPIRATION RATE: 20 BRPM | HEART RATE: 145 BPM | OXYGEN SATURATION: 99 % | SYSTOLIC BLOOD PRESSURE: 153 MMHG | HEIGHT: 67 IN | WEIGHT: 140.65 LBS | TEMPERATURE: 98 F

## 2020-05-19 LAB
ALBUMIN SERPL ELPH-MCNC: 3.9 G/DL — SIGNIFICANT CHANGE UP (ref 3.3–5)
ALP SERPL-CCNC: 59 U/L — SIGNIFICANT CHANGE UP (ref 40–120)
ALT FLD-CCNC: 37 U/L — SIGNIFICANT CHANGE UP (ref 12–78)
ANION GAP SERPL CALC-SCNC: 25 MMOL/L — HIGH (ref 5–17)
APTT BLD: 26 SEC — LOW (ref 27.5–36.3)
AST SERPL-CCNC: 65 U/L — HIGH (ref 15–37)
BASOPHILS # BLD AUTO: 0.05 K/UL — SIGNIFICANT CHANGE UP (ref 0–0.2)
BASOPHILS NFR BLD AUTO: 1.9 % — SIGNIFICANT CHANGE UP (ref 0–2)
BILIRUB SERPL-MCNC: 0.3 MG/DL — SIGNIFICANT CHANGE UP (ref 0.2–1.2)
BLD GP AB SCN SERPL QL: SIGNIFICANT CHANGE UP
BUN SERPL-MCNC: 11 MG/DL — SIGNIFICANT CHANGE UP (ref 7–23)
CALCIUM SERPL-MCNC: 8.6 MG/DL — SIGNIFICANT CHANGE UP (ref 8.5–10.1)
CHLORIDE SERPL-SCNC: 99 MMOL/L — SIGNIFICANT CHANGE UP (ref 96–108)
CK MB BLD-MCNC: 0.7 % — SIGNIFICANT CHANGE UP (ref 0–3.5)
CK MB CFR SERPL CALC: 1.4 NG/ML — SIGNIFICANT CHANGE UP (ref 0.5–3.6)
CK SERPL-CCNC: 203 U/L — SIGNIFICANT CHANGE UP (ref 26–308)
CO2 SERPL-SCNC: 15 MMOL/L — LOW (ref 22–31)
CREAT SERPL-MCNC: 0.98 MG/DL — SIGNIFICANT CHANGE UP (ref 0.5–1.3)
EOSINOPHIL # BLD AUTO: 0 K/UL — SIGNIFICANT CHANGE UP (ref 0–0.5)
EOSINOPHIL NFR BLD AUTO: 0 % — SIGNIFICANT CHANGE UP (ref 0–6)
ETHANOL SERPL-MCNC: 357 MG/DL — HIGH (ref 0–10)
GLUCOSE SERPL-MCNC: 83 MG/DL — SIGNIFICANT CHANGE UP (ref 70–99)
HCT VFR BLD CALC: 38 % — LOW (ref 39–50)
HGB BLD-MCNC: 12.8 G/DL — LOW (ref 13–17)
IMM GRANULOCYTES NFR BLD AUTO: 0 % — SIGNIFICANT CHANGE UP (ref 0–1.5)
INR BLD: 0.97 RATIO — SIGNIFICANT CHANGE UP (ref 0.88–1.16)
LACTATE SERPL-SCNC: 11.4 MMOL/L — CRITICAL HIGH (ref 0.7–2)
LIDOCAIN IGE QN: 193 U/L — SIGNIFICANT CHANGE UP (ref 73–393)
LYMPHOCYTES # BLD AUTO: 1.33 K/UL — SIGNIFICANT CHANGE UP (ref 1–3.3)
LYMPHOCYTES # BLD AUTO: 50.2 % — HIGH (ref 13–44)
MCHC RBC-ENTMCNC: 27.9 PG — SIGNIFICANT CHANGE UP (ref 27–34)
MCHC RBC-ENTMCNC: 33.7 GM/DL — SIGNIFICANT CHANGE UP (ref 32–36)
MCV RBC AUTO: 83 FL — SIGNIFICANT CHANGE UP (ref 80–100)
MONOCYTES # BLD AUTO: 0.16 K/UL — SIGNIFICANT CHANGE UP (ref 0–0.9)
MONOCYTES NFR BLD AUTO: 6 % — SIGNIFICANT CHANGE UP (ref 2–14)
NEUTROPHILS # BLD AUTO: 1.11 K/UL — LOW (ref 1.8–7.4)
NEUTROPHILS NFR BLD AUTO: 41.9 % — LOW (ref 43–77)
NRBC # BLD: 0 /100 WBCS — SIGNIFICANT CHANGE UP (ref 0–0)
PLATELET # BLD AUTO: 262 K/UL — SIGNIFICANT CHANGE UP (ref 150–400)
POTASSIUM SERPL-MCNC: 3.7 MMOL/L — SIGNIFICANT CHANGE UP (ref 3.5–5.3)
POTASSIUM SERPL-SCNC: 3.7 MMOL/L — SIGNIFICANT CHANGE UP (ref 3.5–5.3)
PROT SERPL-MCNC: 8.7 GM/DL — HIGH (ref 6–8.3)
PROTHROM AB SERPL-ACNC: 10.8 SEC — SIGNIFICANT CHANGE UP (ref 10–12.9)
RBC # BLD: 4.58 M/UL — SIGNIFICANT CHANGE UP (ref 4.2–5.8)
RBC # FLD: 15.9 % — HIGH (ref 10.3–14.5)
SARS-COV-2 RNA SPEC QL NAA+PROBE: SIGNIFICANT CHANGE UP
SODIUM SERPL-SCNC: 139 MMOL/L — SIGNIFICANT CHANGE UP (ref 135–145)
TROPONIN I SERPL-MCNC: <.015 NG/ML — SIGNIFICANT CHANGE UP (ref 0.01–0.04)
WBC # BLD: 2.65 K/UL — LOW (ref 3.8–10.5)
WBC # FLD AUTO: 2.65 K/UL — LOW (ref 3.8–10.5)

## 2020-05-19 PROCEDURE — 99291 CRITICAL CARE FIRST HOUR: CPT

## 2020-05-19 PROCEDURE — 93010 ELECTROCARDIOGRAM REPORT: CPT

## 2020-05-19 PROCEDURE — 71045 X-RAY EXAM CHEST 1 VIEW: CPT | Mod: 26

## 2020-05-19 RX ORDER — PANTOPRAZOLE SODIUM 20 MG/1
40 TABLET, DELAYED RELEASE ORAL ONCE
Refills: 0 | Status: COMPLETED | OUTPATIENT
Start: 2020-05-19 | End: 2020-05-19

## 2020-05-19 RX ORDER — SODIUM CHLORIDE 9 MG/ML
2000 INJECTION INTRAMUSCULAR; INTRAVENOUS; SUBCUTANEOUS ONCE
Refills: 0 | Status: COMPLETED | OUTPATIENT
Start: 2020-05-19 | End: 2020-05-19

## 2020-05-19 RX ORDER — METOCLOPRAMIDE HCL 10 MG
10 TABLET ORAL ONCE
Refills: 0 | Status: COMPLETED | OUTPATIENT
Start: 2020-05-19 | End: 2020-05-19

## 2020-05-19 RX ORDER — THIAMINE MONONITRATE (VIT B1) 100 MG
100 TABLET ORAL ONCE
Refills: 0 | Status: COMPLETED | OUTPATIENT
Start: 2020-05-19 | End: 2020-05-19

## 2020-05-19 RX ORDER — FOLIC ACID 0.8 MG
1 TABLET ORAL ONCE
Refills: 0 | Status: COMPLETED | OUTPATIENT
Start: 2020-05-19 | End: 2020-05-19

## 2020-05-19 RX ORDER — ACETAMINOPHEN 500 MG
975 TABLET ORAL ONCE
Refills: 0 | Status: COMPLETED | OUTPATIENT
Start: 2020-05-19 | End: 2020-05-20

## 2020-05-19 RX ADMIN — Medication 10 MILLIGRAM(S): at 21:48

## 2020-05-19 RX ADMIN — PANTOPRAZOLE SODIUM 40 MILLIGRAM(S): 20 TABLET, DELAYED RELEASE ORAL at 21:48

## 2020-05-19 RX ADMIN — SODIUM CHLORIDE 2000 MILLILITER(S): 9 INJECTION INTRAMUSCULAR; INTRAVENOUS; SUBCUTANEOUS at 21:49

## 2020-05-19 NOTE — ED ADULT NURSE NOTE - ED STAT RN HANDOFF DETAILS 2
report given to RN Joaquin.  Safety checks compld this shift/Safety rounds completed hourly.  IV site checked Q2+remains WDL, 20 guage, Right foot  NS infusing at this time, RN made aware repeat lactate needed after. Meds given as ord with no s/s of adverse RXNs. Fall +skin precs in place. Any issues endorsed to oncoming RN for follow up.

## 2020-05-19 NOTE — ED PROVIDER NOTE - CLINICAL SUMMARY MEDICAL DECISION MAKING FREE TEXT BOX
56 yo M with epigastric abd pain, n/v, possible alcoho intox vs withdrawal, doubt acute GIB, but tachycardia is concerning for blood loss and etoh withdrawal, pancreatitis, ischemia less likely, doubt ACS  -basic labs, coags, trop, ckmb, lipase, lactate, covid swab, type and screen, cxr, ekg, iv, ns hydration bolus, CTA abd/pel  -f/u results, reeval, likely admission

## 2020-05-19 NOTE — ED PROVIDER NOTE - CARE PLAN
Principal Discharge DX:	Vomiting, intractability of vomiting not specified, presence of nausea not specified, unspecified vomiting type Principal Discharge DX:	Vomiting, intractability of vomiting not specified, presence of nausea not specified, unspecified vomiting type  Secondary Diagnosis:	Alcohol withdrawal syndrome without complication Principal Discharge DX:	Vomiting, intractability of vomiting not specified, presence of nausea not specified, unspecified vomiting type  Secondary Diagnosis:	Alcohol withdrawal syndrome without complication  Secondary Diagnosis:	Mass of stomach

## 2020-05-19 NOTE — ED PROVIDER NOTE - OBJECTIVE STATEMENT
56 yo M with n/v epigastric pain, pt. noted blood streaked vomitus today x a few episodes, prompting ER visit.  Pt. states last drink was 3-4 days ago--+ chronic alcoholism.  No other complaints/trauma/inciting event.    ROS: negative for fever, cough, headache, chest pain, shortness of breath, diarrhea, rash, paresthesia, and focal weakness--all other systems reviewed are negative.   PMH: gerd, HLD; Meds: Denies; SH: Denies smoking/drug use, + chronic EtOH abuse

## 2020-05-19 NOTE — ED PROVIDER NOTE - PROGRESS NOTE DETAILS
pt. now displaying signs of alcohol withdrawal, shaky extremities, tongue fasciculations, tachycardia  will treat and repeat lactate pt. now displaying signs of alcohol withdrawal, shaky extremities, tongue fasciculations, tachycardia  will treat and repeat lactate  non-specific lesion discovered on CTA abd/pel, in posterior stomach, does not appear to be actively bleeding, will need w/u on admission consulted ICU for elevated lactate in spite of hydration, pt. received 4 L and lactate level came down by 2 pt. refused ICU admit, will admit to medicine for further care, endorsed to Dr. Salazar

## 2020-05-19 NOTE — ED PROVIDER NOTE - PHYSICAL EXAMINATION
Vitals: tachy at 145, htn at 153/102, otherwise WNL  Gen: AAOx3, NAD, sitting uncomfortably in stretcher, non-toxic, responsive to questions, no tongue fasciculations or slurred speech   Head: ncat, perrla, eomi b/l  Neck: supple, no lymphadenopathy, no midline deviation  Heart: rrr, no m/r/g  Lungs: CTA b/l, no rales/ronchi/wheezes  Abd: soft, nontender, non-distended, no rebound or guarding  Ext: no clubbing/cyanosis/edema  Neuro: sensation and muscle strength intact b/l, no focal weakness, CN2-12 intact b/l

## 2020-05-19 NOTE — ED PROVIDER NOTE - CRITICAL CARE PROVIDED
additional history taking/documentation/direct patient care (not related to procedure)/interpretation of diagnostic studies/consultation with other physicians/conducted a detailed discussion of DNR status

## 2020-05-20 DIAGNOSIS — K29.71 GASTRITIS, UNSPECIFIED, WITH BLEEDING: ICD-10-CM

## 2020-05-20 DIAGNOSIS — E87.2 ACIDOSIS: ICD-10-CM

## 2020-05-20 DIAGNOSIS — E78.5 HYPERLIPIDEMIA, UNSPECIFIED: ICD-10-CM

## 2020-05-20 DIAGNOSIS — F10.230 ALCOHOL DEPENDENCE WITH WITHDRAWAL, UNCOMPLICATED: ICD-10-CM

## 2020-05-20 LAB
ALBUMIN SERPL ELPH-MCNC: 3 G/DL — LOW (ref 3.3–5)
ALP SERPL-CCNC: 38 U/L — LOW (ref 40–120)
ALT FLD-CCNC: 31 U/L — SIGNIFICANT CHANGE UP (ref 12–78)
ANION GAP SERPL CALC-SCNC: 10 MMOL/L — SIGNIFICANT CHANGE UP (ref 5–17)
AST SERPL-CCNC: 51 U/L — HIGH (ref 15–37)
BASOPHILS # BLD AUTO: 0.05 K/UL — SIGNIFICANT CHANGE UP (ref 0–0.2)
BASOPHILS NFR BLD AUTO: 1.4 % — SIGNIFICANT CHANGE UP (ref 0–2)
BILIRUB SERPL-MCNC: 0.4 MG/DL — SIGNIFICANT CHANGE UP (ref 0.2–1.2)
BUN SERPL-MCNC: 10 MG/DL — SIGNIFICANT CHANGE UP (ref 7–23)
CALCIUM SERPL-MCNC: 7.1 MG/DL — LOW (ref 8.5–10.1)
CHLORIDE SERPL-SCNC: 110 MMOL/L — HIGH (ref 96–108)
CO2 SERPL-SCNC: 22 MMOL/L — SIGNIFICANT CHANGE UP (ref 22–31)
CREAT SERPL-MCNC: 0.67 MG/DL — SIGNIFICANT CHANGE UP (ref 0.5–1.3)
EOSINOPHIL # BLD AUTO: 0.02 K/UL — SIGNIFICANT CHANGE UP (ref 0–0.5)
EOSINOPHIL NFR BLD AUTO: 0.6 % — SIGNIFICANT CHANGE UP (ref 0–6)
ETHANOL SERPL-MCNC: 77 MG/DL — HIGH (ref 0–10)
GLUCOSE SERPL-MCNC: 65 MG/DL — LOW (ref 70–99)
HCT VFR BLD CALC: 27.1 % — LOW (ref 39–50)
HCT VFR BLD CALC: 27.9 % — LOW (ref 39–50)
HGB BLD-MCNC: 8.8 G/DL — LOW (ref 13–17)
HGB BLD-MCNC: 9.3 G/DL — LOW (ref 13–17)
IMM GRANULOCYTES NFR BLD AUTO: 0.6 % — SIGNIFICANT CHANGE UP (ref 0–1.5)
LACTATE SERPL-SCNC: 4.3 MMOL/L — CRITICAL HIGH (ref 0.7–2)
LACTATE SERPL-SCNC: 8.4 MMOL/L — CRITICAL HIGH (ref 0.7–2)
LYMPHOCYTES # BLD AUTO: 1.53 K/UL — SIGNIFICANT CHANGE UP (ref 1–3.3)
LYMPHOCYTES # BLD AUTO: 43.2 % — SIGNIFICANT CHANGE UP (ref 13–44)
MAGNESIUM SERPL-MCNC: 1.4 MG/DL — LOW (ref 1.6–2.6)
MCHC RBC-ENTMCNC: 28.1 PG — SIGNIFICANT CHANGE UP (ref 27–34)
MCHC RBC-ENTMCNC: 28.5 PG — SIGNIFICANT CHANGE UP (ref 27–34)
MCHC RBC-ENTMCNC: 32.5 GM/DL — SIGNIFICANT CHANGE UP (ref 32–36)
MCHC RBC-ENTMCNC: 33.3 GM/DL — SIGNIFICANT CHANGE UP (ref 32–36)
MCV RBC AUTO: 85.6 FL — SIGNIFICANT CHANGE UP (ref 80–100)
MCV RBC AUTO: 86.6 FL — SIGNIFICANT CHANGE UP (ref 80–100)
MONOCYTES # BLD AUTO: 0.29 K/UL — SIGNIFICANT CHANGE UP (ref 0–0.9)
MONOCYTES NFR BLD AUTO: 8.2 % — SIGNIFICANT CHANGE UP (ref 2–14)
NEUTROPHILS # BLD AUTO: 1.63 K/UL — LOW (ref 1.8–7.4)
NEUTROPHILS NFR BLD AUTO: 46 % — SIGNIFICANT CHANGE UP (ref 43–77)
NRBC # BLD: 0 /100 WBCS — SIGNIFICANT CHANGE UP (ref 0–0)
NRBC # BLD: 0 /100 WBCS — SIGNIFICANT CHANGE UP (ref 0–0)
PHOSPHATE SERPL-MCNC: 2.2 MG/DL — LOW (ref 2.5–4.5)
PLATELET # BLD AUTO: 156 K/UL — SIGNIFICANT CHANGE UP (ref 150–400)
PLATELET # BLD AUTO: 163 K/UL — SIGNIFICANT CHANGE UP (ref 150–400)
POTASSIUM SERPL-MCNC: 4 MMOL/L — SIGNIFICANT CHANGE UP (ref 3.5–5.3)
POTASSIUM SERPL-SCNC: 4 MMOL/L — SIGNIFICANT CHANGE UP (ref 3.5–5.3)
PROT SERPL-MCNC: 6.4 GM/DL — SIGNIFICANT CHANGE UP (ref 6–8.3)
RBC # BLD: 3.13 M/UL — LOW (ref 4.2–5.8)
RBC # BLD: 3.26 M/UL — LOW (ref 4.2–5.8)
RBC # FLD: 16.1 % — HIGH (ref 10.3–14.5)
RBC # FLD: 16.2 % — HIGH (ref 10.3–14.5)
SODIUM SERPL-SCNC: 142 MMOL/L — SIGNIFICANT CHANGE UP (ref 135–145)
WBC # BLD: 2.38 K/UL — LOW (ref 3.8–10.5)
WBC # BLD: 3.54 K/UL — LOW (ref 3.8–10.5)
WBC # FLD AUTO: 2.38 K/UL — LOW (ref 3.8–10.5)
WBC # FLD AUTO: 3.54 K/UL — LOW (ref 3.8–10.5)

## 2020-05-20 PROCEDURE — G0508: CPT | Mod: 95

## 2020-05-20 PROCEDURE — 74174 CTA ABD&PLVS W/CONTRAST: CPT | Mod: 26

## 2020-05-20 PROCEDURE — 12345: CPT | Mod: NC

## 2020-05-20 PROCEDURE — 99222 1ST HOSP IP/OBS MODERATE 55: CPT

## 2020-05-20 RX ORDER — SODIUM CHLORIDE 9 MG/ML
2000 INJECTION INTRAMUSCULAR; INTRAVENOUS; SUBCUTANEOUS ONCE
Refills: 0 | Status: COMPLETED | OUTPATIENT
Start: 2020-05-20 | End: 2020-05-20

## 2020-05-20 RX ORDER — MAGNESIUM SULFATE 500 MG/ML
2 VIAL (ML) INJECTION ONCE
Refills: 0 | Status: COMPLETED | OUTPATIENT
Start: 2020-05-20 | End: 2020-05-20

## 2020-05-20 RX ORDER — SODIUM CHLORIDE 9 MG/ML
1000 INJECTION INTRAMUSCULAR; INTRAVENOUS; SUBCUTANEOUS
Refills: 0 | Status: DISCONTINUED | OUTPATIENT
Start: 2020-05-20 | End: 2020-05-27

## 2020-05-20 RX ORDER — ONDANSETRON 8 MG/1
4 TABLET, FILM COATED ORAL ONCE
Refills: 0 | Status: COMPLETED | OUTPATIENT
Start: 2020-05-20 | End: 2020-05-20

## 2020-05-20 RX ORDER — PANTOPRAZOLE SODIUM 20 MG/1
40 TABLET, DELAYED RELEASE ORAL EVERY 12 HOURS
Refills: 0 | Status: DISCONTINUED | OUTPATIENT
Start: 2020-05-20 | End: 2020-05-22

## 2020-05-20 RX ORDER — PANTOPRAZOLE SODIUM 20 MG/1
8 TABLET, DELAYED RELEASE ORAL
Qty: 80 | Refills: 0 | Status: DISCONTINUED | OUTPATIENT
Start: 2020-05-20 | End: 2020-05-20

## 2020-05-20 RX ORDER — ACETAMINOPHEN 500 MG
650 TABLET ORAL ONCE
Refills: 0 | Status: COMPLETED | OUTPATIENT
Start: 2020-05-20 | End: 2020-05-20

## 2020-05-20 RX ORDER — POTASSIUM PHOSPHATE, MONOBASIC POTASSIUM PHOSPHATE, DIBASIC 236; 224 MG/ML; MG/ML
30 INJECTION, SOLUTION INTRAVENOUS ONCE
Refills: 0 | Status: COMPLETED | OUTPATIENT
Start: 2020-05-20 | End: 2020-05-20

## 2020-05-20 RX ORDER — ACETAMINOPHEN 500 MG
650 TABLET ORAL EVERY 6 HOURS
Refills: 0 | Status: DISCONTINUED | OUTPATIENT
Start: 2020-05-20 | End: 2020-05-27

## 2020-05-20 RX ADMIN — Medication 650 MILLIGRAM(S): at 06:09

## 2020-05-20 RX ADMIN — ONDANSETRON 4 MILLIGRAM(S): 8 TABLET, FILM COATED ORAL at 01:26

## 2020-05-20 RX ADMIN — Medication 2 MILLIGRAM(S): at 00:38

## 2020-05-20 RX ADMIN — SODIUM CHLORIDE 100 MILLILITER(S): 9 INJECTION INTRAMUSCULAR; INTRAVENOUS; SUBCUTANEOUS at 09:38

## 2020-05-20 RX ADMIN — Medication 50 GRAM(S): at 15:00

## 2020-05-20 RX ADMIN — Medication 2 MILLIGRAM(S): at 05:48

## 2020-05-20 RX ADMIN — SODIUM CHLORIDE 2000 MILLILITER(S): 9 INJECTION INTRAMUSCULAR; INTRAVENOUS; SUBCUTANEOUS at 02:50

## 2020-05-20 RX ADMIN — Medication 1 MILLIGRAM(S): at 00:25

## 2020-05-20 RX ADMIN — PANTOPRAZOLE SODIUM 10 MG/HR: 20 TABLET, DELAYED RELEASE ORAL at 09:38

## 2020-05-20 RX ADMIN — POTASSIUM PHOSPHATE, MONOBASIC POTASSIUM PHOSPHATE, DIBASIC 83.33 MILLIMOLE(S): 236; 224 INJECTION, SOLUTION INTRAVENOUS at 18:02

## 2020-05-20 RX ADMIN — Medication 2 MILLIGRAM(S): at 11:12

## 2020-05-20 RX ADMIN — SODIUM CHLORIDE 100 MILLILITER(S): 9 INJECTION INTRAMUSCULAR; INTRAVENOUS; SUBCUTANEOUS at 21:00

## 2020-05-20 RX ADMIN — SODIUM CHLORIDE 2000 MILLILITER(S): 9 INJECTION INTRAMUSCULAR; INTRAVENOUS; SUBCUTANEOUS at 00:25

## 2020-05-20 RX ADMIN — Medication 2 MILLIGRAM(S): at 18:02

## 2020-05-20 RX ADMIN — SODIUM CHLORIDE 2000 MILLILITER(S): 9 INJECTION INTRAMUSCULAR; INTRAVENOUS; SUBCUTANEOUS at 01:55

## 2020-05-20 RX ADMIN — Medication 975 MILLIGRAM(S): at 00:26

## 2020-05-20 RX ADMIN — Medication 100 MILLIGRAM(S): at 00:25

## 2020-05-20 RX ADMIN — Medication 650 MILLIGRAM(S): at 21:47

## 2020-05-20 NOTE — H&P ADULT - NSHPPHYSICALEXAM_GEN_ALL_CORE
Physical exam:  General: patient in no acute distress, resting comfortably  Head:  Atraumatic, Normocephalic  Eyes: EOMI, PERRLA, clear sclera  Neck: Supple, thyroid nontender, non enlarged  Cardio: S1/S2 +ve, regular rate and rhythm, no M/G/R  Resp: clear to ausculation bilaterally, no rales or wheezes  GI: abdomen soft, nontender, non distended, no guarding, BS +ve x 4  Ext: no significant pedal edema

## 2020-05-20 NOTE — CONSULT NOTE ADULT - SUBJECTIVE AND OBJECTIVE BOX
REASON FOR CONSULT: elevated lactate    Chief Complaint    HPI:  · Chief Complaint: The patient is a 55y Male complaining of vomiting blood.	  · HPI Objective Statement: 56 yo M with n/v epigastric pain, pt. noted blood streaked vomitus today x a few episodes, prompting ER visit.  Pt. states last drink was 3-4 days ago--+ chronic alcoholism.  No other complaints/trauma/inciting event.    ROS: negative for fever, cough, headache, chest pain, shortness of breath, diarrhea, rash, paresthesia, and focal weakness--all other systems reviewed are negative.  PMH: gerd, HLD; Meds: Denies; SH: Denies smoking/drug use, + chronic EtOH abuse	  At the time of evaluation the patient was stable, responding well to volume resuscitation.    PAST MEDICAL & SURGICAL HISTORY:  DTs (delirium tremens)  Substance abuse  Gastritis  EtOH dependence  Mixed hyperlipidemia  PUD (peptic ulcer disease)  No significant past surgical history    Allergies    No Known Allergies      FAMILY HISTORY:  No pertinent family history in first degree relatives (Father, Mother)      Review of Systems:  Intoxicated      Medications:  chlordiazePOXIDE 50 milliGRAM(s) Oral Once        Vital Signs Last 24 Hrs  T(C): 36.7 (20 May 2020 02:03), Max: 36.8 (20 May 2020 00:41)  T(F): 98 (20 May 2020 02:03), Max: 98.2 (20 May 2020 00:41)  HR: 99 (20 May 2020 02:03) (99 - 145)  BP: 121/69 (20 May 2020 02:03) (121/69 - 153/102)  RR: 19 (20 May 2020 02:03) (15 - 20)  SpO2: 98% (20 May 2020 02:03) (95% - 100%)        LABS:                        12.8   2.65  )-----------( 262      ( 19 May 2020 21:17 )             38.0     05-19    139  |  99  |  11  ----------------------------<  83  3.7   |  15<L>  |  0.98    Ca    8.6      19 May 2020 21:17    TPro  8.7<H>  /  Alb  3.9  /  TBili  0.3  /  DBili  x   /  AST  65<H>  /  ALT  37  /  AlkPhos  59  05-19      CARDIAC MARKERS ( 19 May 2020 21:17 )  <.015 ng/mL / x     / 203 U/L / x     / 1.4 ng/mL      Alcohol- 357      PT/INR - ( 19 May 2020 21:17 )   PT: 10.8 sec;   INR: 0.97 ratio         PTT - ( 19 May 2020 21:17 )  PTT:26.0 sec    CULTURES:      Physical Examination:  Physical exam as per bedside MD (direct physical examination could not be performed because the visit was provided via Telemedicine):       RADIOLOGY: < from: CT Angio Abdomen and Pelvis w/ IV Cont (05.20.20 @ 00:15) >  Celiac axis and proximal SMA are patent. Proximal WIN is patent. Distal SMA and WIN branches are not well assessed by CT technique. Bilateral renal arteries are patent. Bilateral iliac arteries are normal caliber.    GI tract: No dilatation or significant bowel wall thickening. No CT evidence of active GI bleeding is identified. There is small hiatal hernia or wall thickeningof the distal thoracic esophagus.  Hypodense lesion measuring 2.5 x 1.4 cm within the fundal portion of the stomach on image 50 of series 602. The findings are nonspecific, gastric ulcer or neoplasm considered Nonemergent endoscopic evaluation is advised to exclude underlying malignancy. Normal appendix.  Peritoneum/retroperitoneum and mesentery: No free air. No organized fluid collection. No adenopathy.    Pelvic organs/Bladder: Unremarkable. Bladder is distended without     Abdominal wall: Unremarkable.  Bones and soft tissues: Mild multilevel degenerative changes of the spine.    IMPRESSION:    No CT evidence of active GI bleeding.    No CT findings of acute pancreatitis.    Small hiatal hernia or wall thickening of the distal thoracic esophagus.  Hypodense lesion measuring 2.5 x 1.4 cm within the fundal portion of the stomach. The findings are nonspecific, gastric ulcer or neoplasm considered Nonemergent endoscopic evaluation is advised to exclude underlying malignancy.     < end of copied text >        Impression-     ETOHism     Intoxicated     Dehydrated     Anemia         Plan-     Admit to telemetry     No indication at this time for ICU     Hydration     [facility] protocol     monitor [facility]        CRITICAL CARE TIME SPENT: 40    This visit was provided via telemedicine. Patient was located at     [facility]    Provider was located at [email or web address] [facility].[address] [place] [address] for the visit.

## 2020-05-20 NOTE — CHART NOTE - NSCHARTNOTEFT_GEN_A_CORE
seen and examined  c/w current care, ppi/gi eval, ciwa, replace electrolytes  repeat cbc    Vital Signs Last 24 Hrs  T(C): 37.1 (20 May 2020 08:00), Max: 37.1 (20 May 2020 08:00)  T(F): 98.8 (20 May 2020 08:00), Max: 98.8 (20 May 2020 08:00)  HR: 85 (20 May 2020 08:00) (85 - 145)  BP: 109/74 (20 May 2020 08:00) (109/74 - 153/102)  BP(mean): --  RR: 18 (20 May 2020 08:00) (15 - 20)  SpO2: 98% (20 May 2020 08:00) (95% - 100%)                            8.8    3.54  )-----------( 163      ( 20 May 2020 08:37 )             27.1     05-20    142  |  110<H>  |  10  ----------------------------<  65<L>  4.0   |  22  |  0.67    Ca    7.1<L>      20 May 2020 10:06  Phos  2.2     05-20  Mg     1.4     05-20    TPro  6.4  /  Alb  3.0<L>  /  TBili  0.4  /  DBili  x   /  AST  51<H>  /  ALT  31  /  AlkPhos  38<L>  05-20    PT/INR - ( 19 May 2020 21:17 )   PT: 10.8 sec;   INR: 0.97 ratio         PTT - ( 19 May 2020 21:17 )  PTT:26.0 sec

## 2020-05-20 NOTE — CONSULT NOTE ADULT - SUBJECTIVE AND OBJECTIVE BOX
HPI:  Patient is a 55M with a PMH of chronic ETOH abuse with hx of alcohol withdrawal with frequent hospital admissions, GERD, HLD, alcoholic gastritis/esophagitis, PUD, hx of hypoxic respiratory failure requiring intubation due to aspiration PNA, presents for abdominal pain and blood streaked vomiting.  Patient is a poor historian, recently received IV ativan.  ED attending reports hx of abdominal pain, nausea, and vomiting.  Tachycardia resolved with fluid in ED.  .  Significant lactic acidosis.  CT abdomen performed and found no acute pathology.  Evaled by ICU and deemed appropriate for tele.  Will admit to tele, GI eval pending. (20 May 2020 04:07)  --------------- As Above ---------------------------------------------- Patient seen earlier  Patient is a poor historian ( lethargy ) . Patient known to my service. Patient presented with abdominal pain, and vomiting blood streaked vomitus. HGB dropped in ER.   In ER, patient had no N/V or BMs.  Patient known to my servie. Multiple admissions for abdominal pain, N/V and upper GI bleed. Had 2 EGDs / past year. ( Esophageal ulcer ) CT scan revealed ? ulcer VS mass.       PAST MEDICAL & SURGICAL HISTORY:  DTs (delirium tremens)  Substance abuse  Gastritis  EtOH dependence  Mixed hyperlipidemia  PUD (peptic ulcer disease)  No significant past surgical history      MEDICATIONS  (STANDING):  LORazepam   Injectable 2 milliGRAM(s) IV Push every 6 hours  magnesium sulfate  IVPB 2 Gram(s) IV Intermittent once  pantoprazole Infusion 8 mG/Hr (10 mL/Hr) IV Continuous <Continuous>  potassium phosphate IVPB 30 milliMole(s) IV Intermittent once  sodium chloride 0.9%. 1000 milliLiter(s) (100 mL/Hr) IV Continuous <Continuous>    MEDICATIONS  (PRN):  LORazepam   Injectable 2 milliGRAM(s) IV Push every 30 minutes PRN Agitation      Allergies    No Known Allergies    Intolerances        FAMILY HISTORY:  No pertinent family history in first degree relatives (Father, Mother)      REVIEW OF SYSTEMS:    CONSTITUTIONAL: No fever, weight loss,   EYES: No eye pain, visual disturbances, or discharge  ENMT:  No difficulty hearing, tinnitus, vertigo; No sinus or throat pain  NECK: No pain or stiffness  BREASTS: No pain, masses, or nipple discharge  RESPIRATORY: No cough, wheezing, chills or hemoptysis; No shortness of breath  CARDIOVASCULAR: No chest pain, palpitations, dizziness, or leg swelling  GASTROINTESTINAL:  See above  GENITOURINARY: No dysuria, frequency, hematuria, or incontinence  NEUROLOGICAL: No headaches, , numbness, or tremors  SKIN: No itching, burning, rashes, or lesions   LYMPH NODES: No enlarged glands  ENDOCRINE: No heat or cold intolerance; No hair loss  MUSCULOSKELETAL: No joint pain or swelling; No muscle, back, or extremity pain  PSYCHIATRIC: No depression, anxiety, mood swings, or difficulty sleeping  HEME/LYMPH: No easy bruising, or bleeding gums  ALLERGY AND IMMUNOLOGIC: No hives or eczema          SOCIAL HISTORY:    FAMILY HISTORY:  No pertinent family history in first degree relatives (Father, Mother)      Vital Signs Last 24 Hrs  T(C): 37.1 (20 May 2020 08:00), Max: 37.1 (20 May 2020 08:00)  T(F): 98.8 (20 May 2020 08:00), Max: 98.8 (20 May 2020 08:00)  HR: 85 (20 May 2020 08:00) (85 - 145)  BP: 109/74 (20 May 2020 08:00) (109/74 - 153/102)  BP(mean): --  RR: 18 (20 May 2020 08:00) (15 - 20)  SpO2: 98% (20 May 2020 08:00) (95% - 100%)    PHYSICAL EXAM:    GENERAL: NAD, well-groomed, well-developed  HEAD:  Atraumatic, Normocephalic  EYES: EOMI, PERRLA, conjunctiva and sclera clear  NECK: Supple, No JVD, Normal thyroid  NERVOUS SYSTEM:  Lethargic (+)   CHEST/LUNG: Clear to percussion bilaterally; No rales, rhonchi, wheezing, or rubs  HEART: Regular rate and rhythm; No murmurs, rubs, or gallops  ABDOMEN: Soft, Nontender, minimal mid epigastric tenderness Nondistended; Bowel sounds present  EXTREMITIES:  2+ Peripheral Pulses, No clubbing, cyanosis, or edema  LYMPH: No lymphadenopathy noted   RECTAL: No masses, prostate normal size and smooth, Guaiaci negative   BREAST: No palpable masses, skin no lesions no retractions, no discharges. adnexal no palpable masses noted   GYN: uterus normal size, adnexal, no palpable masses, no CMT, no uterine discharge   SKIN: No rashes or lesions    LABS:                        8.8    3.54  )-----------( 163      ( 20 May 2020 08:37 )             27.1       CBC:  05-20 @ 08:37  WBC  3.54  HGB 8.8  HCT 27.1 Plate 163  MCV 86.6  05-19 @ 21:17  WBC  2.65  HGB 12.8  HCT 38.0 Plate 262  MCV 83.0  05-14 @ 00:32  WBC  3.02  HGB 11.5  HCT 35.7 Plate 216  MCV 86.9           20 May 2020 10:06    142    |  110    |  10     ----------------------------<  65     4.0     |  22     |  0.67   19 May 2020 21:17    139    |  99     |  11     ----------------------------<  83     3.7     |  15     |  0.98   14 May 2020 00:32    149    |  113    |  14     ----------------------------<  85     3.6     |  21     |  0.80     Ca    7.1        20 May 2020 10:06  Ca    8.6        19 May 2020 21:17  Ca    8.3        14 May 2020 00:32  Phos  2.2       20 May 2020 10:06  Mg     1.4       20 May 2020 10:06    TPro  6.4    /  Alb  3.0    /  TBili  0.4    /  DBili  x      /  AST  51     /  ALT  31     /  AlkPhos  38     20 May 2020 10:06  TPro  8.7    /  Alb  3.9    /  TBili  0.3    /  DBili  x      /  AST  65     /  ALT  37     /  AlkPhos  59     19 May 2020 21:17  TPro  8.4    /  Alb  3.6    /  TBili  0.1    /  DBili  x      /  AST  24     /  ALT  31     /  AlkPhos  49     14 May 2020 00:32    PT/INR - ( 19 May 2020 21:17 )   PT: 10.8 sec;   INR: 0.97 ratio         PTT - ( 19 May 2020 21:17 )  PTT:26.0 sec        RADIOLOGY & ADDITIONAL STUDIES:

## 2020-05-20 NOTE — H&P ADULT - PROBLEM SELECTOR PLAN 1
CIWA currently 0, recently received ativan  Will continue withdrawal protocol with Ativan q6 standing.  Slow taper as patient is a HIGH risk for withdrawal  Ativan 2 mg IVP PRN for acute withdrawal symptoms  Thiamine, Folate, MVI daily when tolerating PO

## 2020-05-20 NOTE — H&P ADULT - ASSESSMENT
Patient is a 55M with a PMH of chronic ETOH abuse with hx of alcohol withdrawal with frequent hospital admissions, GERD, HLD, alcoholic gastritis/esophagitis, PUD, hx of hypoxic respiratory failure requiring intubation due to aspiration PNA, presents for abdominal pain and blood streaked vomiting.  Patient is a poor historian, recently received IV ativan.  ED attending reports hx of abdominal pain, nausea, and vomiting.  Tachycardia resolved with fluid in ED.  .  Significant lactic acidosis.  CT abdomen performed and found no acute pathology.  Evaled by ICU and deemed appropriate for tele.  Will admit to tele, GI eval pending.     IMPROVE VTE Individual Risk Assessment          RISK                                                          Points  [  ] Previous VTE                                                3  [  ] Thrombophilia                                             2  [  ] Lower limb paralysis                                    2        (unable to hold up >15 seconds)    [  ] Current Cancer                                             2         (within 6 months)  [  ] Immobilization > 24 hrs                              1  [  ] ICU/CCU stay > 24 hours                            1  [  ] Age > 60                                                    1    IMPROVE VTE Score - 1

## 2020-05-20 NOTE — CONSULT NOTE ADULT - SUBJECTIVE AND OBJECTIVE BOX
54yo M with PMH of HTN, HLD, GERD, and chronic alcoholism with frequent ED visits and hospital admissions here recently, presents to ED today with epigastric pain, nausea and vomiting x 2 days. Pt endorses some episodes of hematemesis. Denies fevers, chills, CP, SOB, dysuria, hematuria, hematochezia, or any other symptoms.     ## Allergies  No Known Allergies    ## Medications:  chlordiazePOXIDE 50 milliGRAM(s) Oral Once    ### PMH / PSH:  PAST MEDICAL & SURGICAL HISTORY:  DTs (delirium tremens)  Substance abuse  Gastritis  EtOH dependence  Mixed hyperlipidemia  PUD (peptic ulcer disease)  No significant past surgical history    ### Social Hx: Pt is a chronic alcoholic, states he drinks "2-3 bottles of vodka" a day. Admits to increased drinking since March as he has been unable to continue his job as a  due to Covid-19 pandemic.     ## ROS: As above.     ## Exam  ### Vitals:  T(C): 36.7 (05-20-20 @ 02:03), Max: 36.8 (05-20-20 @ 00:41)  HR: 99 (05-20-20 @ 02:03) (99 - 145)  BP: 121/69 (05-20-20 @ 02:03) (121/69 - 153/102)  RR: 19 (05-20-20 @ 02:03) (15 - 20)  SpO2: 98% (05-20-20 @ 02:03) (95% - 100%)    ### P/E:    Gen: Lying in stretcher in no acute distress  HEENT: PERRL, EOMI  Resp: CTA B/L, no wheezes/rales/rhonchi.  CVS: Tachycardic, regular rhythm, no murmurs/rubs/gallops  Abd: Diffusely tender to palpation. Nondistended, +guarding.   Ext: No clubbing/cyanosis/edema.  Neuro: A&Ox3    ## Labs:                        12.8   2.65  )-----------( 262      ( 19 May 2020 21:17 )             38.0     05-19    139  |  99  |  11  ----------------------------<  83  3.7   |  15<L>  |  0.98    Ca    8.6      19 May 2020 21:17    TPro  8.7<H>  /  Alb  3.9  /  TBili  0.3  /  DBili  x   /  AST  65<H>  /  ALT  37  /  AlkPhos  59  05-19    CAPILLARY BLOOD GLUCOSE    PT/INR - ( 19 May 2020 21:17 )   PT: 10.8 sec;   INR: 0.97 ratio    PTT - ( 19 May 2020 21:17 )  PTT:26.0 sec    CARDIAC MARKERS ( 19 May 2020 21:17 )  Trop <.015 ng/mL / x     / 203 U/L / x     / 1.4 ng/mL    Alcohol, Blood (05.19.20 @ 21:17)    Alcohol, Blood: 357    Lipase, Serum (05.19.20 @ 21:17)    Lipase, Serum: 193 U/L    Lactate, Blood (05.19.20 @ 21:17)    Lactate, Blood: 11.4    Lactate, Blood (05.20.20 @ 02:17)    Lactate, Blood: 8.4

## 2020-05-20 NOTE — CONSULT NOTE ADULT - ASSESSMENT
56yo M with PMH of chronic alcoholism, frequent ED patient, presents to ED today with epigastric pain and hematemesis x 2 days. Found in ED to have blood alcohol level of 357 and lactate of 11.4. CTA of abdomen negative for signs of acute GIB or pancreatitis, lipase normal. ICU consulted for lactic acidosis.     -Lactic acidosis improving, repeat lactate 8.4 s/p 5 L IVF resuscitation.  -No signs of ischemia or hypoperfusion. Vital signs stable, CTA of abdomen negative for GIB or pancreatitis.   -Recommend continued aggressive IVF resuscitation. Monitor UO.   -Recommend initiation of CIWA protocol  -Pt does not require ICU level of care at this time.

## 2020-05-20 NOTE — CONSULT NOTE ADULT - ASSESSMENT
HPI:  Patient is a 55M with a PMH of chronic ETOH abuse with hx of alcohol withdrawal with frequent hospital admissions, GERD, HLD, alcoholic gastritis/esophagitis, PUD, hx of hypoxic respiratory failure requiring intubation due to aspiration PNA, presents for abdominal pain and blood streaked vomiting.  Patient is a poor historian, recently received IV ativan.  ED attending reports hx of abdominal pain, nausea, and vomiting.  Tachycardia resolved with fluid in ED.  .  Significant lactic acidosis.  CT abdomen performed and found no acute pathology.  Evaled by ICU and deemed appropriate for tele.  Will admit to tele, GI eval pending. (20 May 2020 04:07)  --------------- As Above ---------------------------------------------- Patient seen earlier  Patient is a poor historian ( lethargy ) . Patient known to my service. Patient presented with abdominal pain, and vomiting blood streaked vomitus. HGB dropped in ER.   In ER, patient had no N/V or BMs.  Patient known to my servie. Multiple admissions for abdominal pain, N/V and upper GI bleed. Had 2 EGDs / past year. ( Esophageal ulcer ) CT scan revealed ? ulcer VS mass.     Upper GI bleed c/w MW tear - 12.8 --> 8.8 1) PPI 2) NPO 3) Zofran PRN 4) F/U labs 5) Will need EGD prior to discharge. ( earlier if bleeding recurs )

## 2020-05-20 NOTE — H&P ADULT - HISTORY OF PRESENT ILLNESS
Patient is a 55M with a PMH of chronic ETOH abuse with hx of alcohol withdrawal with frequent hospital admissions, GERD, HLD, alcoholic gastritis/esophagitis, PUD, hx of hypoxic respiratory failure requiring intubation due to aspiration PNA, presents for abdominal pain and blood streaked vomiting.  Patient is a poor historian, recently received IV ativan.  ED attending reports hx of abdominal pain, nausea, and vomiting.  Tachycardia resolved with fluid in ED.  .  Significant lactic acidosis.  CT abdomen performed and found no acute pathology.  Evaled by ICU and deemed appropriate for tele.  Will admit to tele, GI eval pending.

## 2020-05-20 NOTE — H&P ADULT - NSHPLABSRESULTS_GEN_ALL_CORE
Recent Vitals  T(C): 36.7 (05-20-20 @ 02:03), Max: 36.8 (05-20-20 @ 00:41)  HR: 99 (05-20-20 @ 02:03) (99 - 145)  BP: 121/69 (05-20-20 @ 02:03) (121/69 - 153/102)  RR: 19 (05-20-20 @ 02:03) (15 - 20)  SpO2: 98% (05-20-20 @ 02:03) (95% - 100%)                        12.8   2.65  )-----------( 262      ( 19 May 2020 21:17 )             38.0     05-19    139  |  99  |  11  ----------------------------<  83  3.7   |  15<L>  |  0.98    Ca    8.6      19 May 2020 21:17    TPro  8.7<H>  /  Alb  3.9  /  TBili  0.3  /  DBili  x   /  AST  65<H>  /  ALT  37  /  AlkPhos  59  05-19    PT/INR - ( 19 May 2020 21:17 )   PT: 10.8 sec;   INR: 0.97 ratio         PTT - ( 19 May 2020 21:17 )  PTT:26.0 sec  LIVER FUNCTIONS - ( 19 May 2020 21:17 )  Alb: 3.9 g/dL / Pro: 8.7 gm/dL / ALK PHOS: 59 U/L / ALT: 37 U/L / AST: 65 U/L / GGT: x               Home Medications:  atorvastatin 20 mg oral tablet: 1 tab(s) orally once a day (at bedtime) (03 Mar 2020 10:19)  folic acid 1 mg oral tablet: 1 tab(s) orally once a day (03 Mar 2020 10:19)  Multiple Vitamins oral tablet: 1 tab(s) orally once a day (03 Mar 2020 10:19)  thiamine 100 mg oral tablet: 1 tab(s) orally once a day (03 Mar 2020 10:19)

## 2020-05-21 DIAGNOSIS — K92.2 GASTROINTESTINAL HEMORRHAGE, UNSPECIFIED: ICD-10-CM

## 2020-05-21 LAB
ALBUMIN SERPL ELPH-MCNC: 3.1 G/DL — LOW (ref 3.3–5)
ALP SERPL-CCNC: 46 U/L — SIGNIFICANT CHANGE UP (ref 40–120)
ALT FLD-CCNC: 31 U/L — SIGNIFICANT CHANGE UP (ref 12–78)
ANION GAP SERPL CALC-SCNC: 11 MMOL/L — SIGNIFICANT CHANGE UP (ref 5–17)
AST SERPL-CCNC: 54 U/L — HIGH (ref 15–37)
BILIRUB SERPL-MCNC: 1.2 MG/DL — SIGNIFICANT CHANGE UP (ref 0.2–1.2)
BUN SERPL-MCNC: 7 MG/DL — SIGNIFICANT CHANGE UP (ref 7–23)
CALCIUM SERPL-MCNC: 7.9 MG/DL — LOW (ref 8.5–10.1)
CHLORIDE SERPL-SCNC: 103 MMOL/L — SIGNIFICANT CHANGE UP (ref 96–108)
CO2 SERPL-SCNC: 24 MMOL/L — SIGNIFICANT CHANGE UP (ref 22–31)
CREAT SERPL-MCNC: 0.61 MG/DL — SIGNIFICANT CHANGE UP (ref 0.5–1.3)
GLUCOSE SERPL-MCNC: 63 MG/DL — LOW (ref 70–99)
HCT VFR BLD CALC: 28.8 % — LOW (ref 39–50)
HGB BLD-MCNC: 9.4 G/DL — LOW (ref 13–17)
LACTATE SERPL-SCNC: 0.9 MMOL/L — SIGNIFICANT CHANGE UP (ref 0.7–2)
MAGNESIUM SERPL-MCNC: 2 MG/DL — SIGNIFICANT CHANGE UP (ref 1.6–2.6)
MCHC RBC-ENTMCNC: 28.1 PG — SIGNIFICANT CHANGE UP (ref 27–34)
MCHC RBC-ENTMCNC: 32.6 GM/DL — SIGNIFICANT CHANGE UP (ref 32–36)
MCV RBC AUTO: 86 FL — SIGNIFICANT CHANGE UP (ref 80–100)
NRBC # BLD: 0 /100 WBCS — SIGNIFICANT CHANGE UP (ref 0–0)
PHOSPHATE SERPL-MCNC: 1.9 MG/DL — LOW (ref 2.5–4.5)
PHOSPHATE SERPL-MCNC: 1.9 MG/DL — LOW (ref 2.5–4.5)
PLATELET # BLD AUTO: 153 K/UL — SIGNIFICANT CHANGE UP (ref 150–400)
POTASSIUM SERPL-MCNC: 3.7 MMOL/L — SIGNIFICANT CHANGE UP (ref 3.5–5.3)
POTASSIUM SERPL-SCNC: 3.7 MMOL/L — SIGNIFICANT CHANGE UP (ref 3.5–5.3)
PROT SERPL-MCNC: 6.7 GM/DL — SIGNIFICANT CHANGE UP (ref 6–8.3)
RBC # BLD: 3.35 M/UL — LOW (ref 4.2–5.8)
RBC # FLD: 15.8 % — HIGH (ref 10.3–14.5)
SODIUM SERPL-SCNC: 138 MMOL/L — SIGNIFICANT CHANGE UP (ref 135–145)
WBC # BLD: 2.82 K/UL — LOW (ref 3.8–10.5)
WBC # FLD AUTO: 2.82 K/UL — LOW (ref 3.8–10.5)

## 2020-05-21 PROCEDURE — 88312 SPECIAL STAINS GROUP 1: CPT | Mod: 26

## 2020-05-21 PROCEDURE — 99233 SBSQ HOSP IP/OBS HIGH 50: CPT

## 2020-05-21 PROCEDURE — 88305 TISSUE EXAM BY PATHOLOGIST: CPT | Mod: 26

## 2020-05-21 RX ORDER — MORPHINE SULFATE 50 MG/1
2 CAPSULE, EXTENDED RELEASE ORAL ONCE
Refills: 0 | Status: DISCONTINUED | OUTPATIENT
Start: 2020-05-21 | End: 2020-05-21

## 2020-05-21 RX ADMIN — PANTOPRAZOLE SODIUM 40 MILLIGRAM(S): 20 TABLET, DELAYED RELEASE ORAL at 06:21

## 2020-05-21 RX ADMIN — Medication 2 MILLIGRAM(S): at 12:07

## 2020-05-21 RX ADMIN — Medication 2 MILLIGRAM(S): at 23:28

## 2020-05-21 RX ADMIN — Medication 2 MILLIGRAM(S): at 21:30

## 2020-05-21 RX ADMIN — Medication 2 MILLIGRAM(S): at 06:21

## 2020-05-21 RX ADMIN — Medication 2 MILLIGRAM(S): at 00:41

## 2020-05-21 RX ADMIN — MORPHINE SULFATE 2 MILLIGRAM(S): 50 CAPSULE, EXTENDED RELEASE ORAL at 00:41

## 2020-05-21 RX ADMIN — Medication 2 MILLIGRAM(S): at 17:22

## 2020-05-21 RX ADMIN — SODIUM CHLORIDE 100 MILLILITER(S): 9 INJECTION INTRAMUSCULAR; INTRAVENOUS; SUBCUTANEOUS at 21:31

## 2020-05-21 RX ADMIN — PANTOPRAZOLE SODIUM 40 MILLIGRAM(S): 20 TABLET, DELAYED RELEASE ORAL at 17:22

## 2020-05-21 NOTE — PROGRESS NOTE ADULT - SUBJECTIVE AND OBJECTIVE BOX
Patient is a 55y old  Male who presents with a chief complaint of nasuea/vomiting, EtOH (21 May 2020 13:14)      HPI:  Patient is a 55M with a PMH of chronic ETOH abuse with hx of alcohol withdrawal with frequent hospital admissions, GERD, HLD, alcoholic gastritis/esophagitis, PUD, hx of hypoxic respiratory failure requiring intubation due to aspiration PNA, presents for abdominal pain and blood streaked vomiting.  Patient is a poor historian, recently received IV ativan.  ED attending reports hx of abdominal pain, nausea, and vomiting.  Tachycardia resolved with fluid in ED.  .  Significant lactic acidosis.  CT abdomen performed and found no acute pathology.  Evaled by ICU and deemed appropriate for tele.  Will admit to tele, GI eval pending. (20 May 2020 04:07)      INTERVAL HPI/OVERNIGHT EVENTS:  Abdominal pain present but better. No N/V    MEDICATIONS  (STANDING):  LORazepam   Injectable 2 milliGRAM(s) IV Push every 6 hours  LORazepam   Injectable   IV Push   pantoprazole  Injectable 40 milliGRAM(s) IV Push every 12 hours  sodium chloride 0.9%. 1000 milliLiter(s) (100 mL/Hr) IV Continuous <Continuous>    MEDICATIONS  (PRN):  acetaminophen   Tablet .. 650 milliGRAM(s) Oral every 6 hours PRN Mild Pain (1 - 3)  LORazepam   Injectable 2 milliGRAM(s) IV Push every 30 minutes PRN Agitation      FAMILY HISTORY:  No pertinent family history in first degree relatives (Father, Mother)      Allergies    No Known Allergies    Intolerances        PMH/PSH:  DTs (delirium tremens)  Substance abuse  Gastritis  EtOH dependence  Mixed hyperlipidemia  PUD (peptic ulcer disease)  Alcohol abuse  No significant past surgical history        REVIEW OF SYSTEMS:  CONSTITUTIONAL: No fever, weight loss,   EYES: No eye pain, visual disturbances, or discharge  ENMT:  No difficulty hearing, tinnitus, vertigo; No sinus or throat pain  NECK: No pain or stiffness  BREASTS: No pain, masses, or nipple discharge  RESPIRATORY: No cough, wheezing, chills or hemoptysis; No shortness of breath  CARDIOVASCULAR: No chest pain, palpitations, dizziness, or leg swelling  GASTROINTESTINAL: See above  GENITOURINARY: No dysuria, frequency, hematuria, or incontinence  NEUROLOGICAL: No headaches, memory loss, loss of strength, numbness, or tremors  SKIN: No itching, burning, rashes, or lesions   LYMPH NODES: No enlarged glands  ENDOCRINE: No heat or cold intolerance; No hair loss  MUSCULOSKELETAL: No joint pain or swelling; No muscle, back, or extremity pain  PSYCHIATRIC: No depression, anxiety, mood swings, or difficulty sleeping  HEME/LYMPH: No easy bruising, or bleeding gums  ALLERGY AND IMMUNOLOGIC: No hives or eczema    Vital Signs Last 24 Hrs  T(C): 37.3 (21 May 2020 11:01), Max: 37.3 (20 May 2020 17:11)  T(F): 99.2 (21 May 2020 11:01), Max: 99.2 (21 May 2020 11:01)  HR: 67 (21 May 2020 11:01) (58 - 80)  BP: 127/68 (21 May 2020 11:01) (111/69 - 128/78)  BP(mean): --  RR: 18 (21 May 2020 11:01) (18 - 18)  SpO2: 97% (21 May 2020 11:01) (97% - 97%)    PHYSICAL EXAM:  GENERAL: NAD, well-groomed, well-developed  HEAD:  Atraumatic, Normocephalic  EYES: EOMI, PERRLA, conjunctiva and sclera clear  NECK: Supple, No JVD, Normal thyroid  NERVOUS SYSTEM:  Alert & Oriented X3, Good concentration; Motor Strength 5/5 B/L upper and lower extremities;  CHEST/LUNG: Clear to percussion bilaterally; No rales, rhonchi, wheezing, or rubs  HEART: Regular rate and rhythm; No murmurs, rubs, or gallops  ABDOMEN: Soft, Nontender, Nondistended; Bowel sounds present  EXTREMITIES:  2+ Peripheral Pulses, No clubbing, cyanosis, or edema  LYMPH: No lymphadenopathy noted  SKIN: No rashes or lesions    LAB  05-19 @ 21:17  amylase --   lipase 193                           9.4    2.82  )-----------( 153      ( 21 May 2020 08:52 )             28.8       CBC:  05-21 @ 08:52  WBC 2.82   Hgb 9.4   Hct 28.8   Plts 153  MCV 86.0  05-20 @ 12:33  WBC 2.38   Hgb 9.3   Hct 27.9   Plts 156  MCV 85.6  05-20 @ 08:37  WBC 3.54   Hgb 8.8   Hct 27.1   Plts 163  MCV 86.6  05-19 @ 21:17  WBC 2.65   Hgb 12.8   Hct 38.0   Plts 262  MCV 83.0      Chemistry:  05-21 @ 07:58  Na+ 138  K+ 3.7  Cl- 103  CO2 24  BUN 7  Cr 0.61     05-20 @ 10:06  Na+ 142  K+ 4.0  Cl- 110  CO2 22  BUN 10  Cr 0.67     05-19 @ 21:17  Na+ 139  K+ 3.7  Cl- 99  CO2 15  BUN 11  Cr 0.98         Glucose, Serum: 63 mg/dL (05-21 @ 07:58)  Glucose, Serum: 65 mg/dL (05-20 @ 10:06)  Glucose, Serum: 83 mg/dL (05-19 @ 21:17)      21 May 2020 07:58    138    |  103    |  7      ----------------------------<  63     3.7     |  24     |  0.61   20 May 2020 10:06    142    |  110    |  10     ----------------------------<  65     4.0     |  22     |  0.67   19 May 2020 21:17    139    |  99     |  11     ----------------------------<  83     3.7     |  15     |  0.98     Ca    7.9        21 May 2020 07:58  Ca    7.1        20 May 2020 10:06  Ca    8.6        19 May 2020 21:17  Phos  1.9       21 May 2020 07:58  Phos  2.2       20 May 2020 10:06  Mg     2.0       21 May 2020 07:58  Mg     1.4       20 May 2020 10:06    TPro  6.7    /  Alb  3.1    /  TBili  1.2    /  DBili  x      /  AST  54     /  ALT  31     /  AlkPhos  46     21 May 2020 07:58  TPro  6.4    /  Alb  3.0    /  TBili  0.4    /  DBili  x      /  AST  51     /  ALT  31     /  AlkPhos  38     20 May 2020 10:06  TPro  8.7    /  Alb  3.9    /  TBili  0.3    /  DBili  x      /  AST  65     /  ALT  37     /  AlkPhos  59     19 May 2020 21:17      PT/INR - ( 19 May 2020 21:17 )   PT: 10.8 sec;   INR: 0.97 ratio         PTT - ( 19 May 2020 21:17 )  PTT:26.0 sec        CAPILLARY BLOOD GLUCOSE              RADIOLOGY & ADDITIONAL TESTS:  < from: CT Angio Abdomen and Pelvis w/ IV Cont (05.20.20 @ 00:15) >    EXAM:  CT ANGIO ABD PELV (W)AW IC                            PROCEDURE DATE:  05/20/2020          INTERPRETATION:  CT ANGIOGRAPHY OF THE ABDOMEN AND PELVIS    INDICATION: Vomiting blood. History of alcoholism and pancreatitis,     TECHNIQUE: CT angiography performed of the abdomen and pelvis, before and in arterial and portal venous phase after the administration of intravenous contrast. Postprocessed MIP reformatted images were created and reviewed.    90 mL of Omnipaque 350 contrast material was injected IV.    COMPARISON: CT abdomen pelvis 3/11/2020.    FINDINGS:  Lung bases: No consolidation or pleural effusion.    Liver: Extensive steatosis.  Biliary: No dilatation. No calcified gallstones within the gallbladder. Consider correlation with upper quadrant ultrasound if there is concern for gallbladder disease.  Spleen: No suspicious lesions.  Pancreas: No inflammatory changes or ductal dilatation.  Adrenals: Normal.  Kidneys: No hydronephrosis or solid mass.  Vessels: Normal caliber aorta. Mild atherosclerotic disease of the aorta and its branches. No intimal flap is seen to suggest aortic dissection.    Celiac axis and proximal SMA are patent. Proximal WIN is patent. Distal SMA and WIN branches are not well assessed by CT technique. Bilateral renal arteries are patent. Bilateral iliac arteries are normal caliber.    GI tract: No dilatation or significant bowel wall thickening. No CT evidence of active GI bleeding is identified. There is small hiatal hernia or wall thickeningof the distal thoracic esophagus.  Hypodense lesion measuring 2.5 x 1.4 cm within the fundal portion of the stomach on image 50 of series 602. The findings are nonspecific, gastric ulcer or neoplasm considered Nonemergent endoscopic evaluation is advised to exclude underlying malignancy. Normal appendix.  Peritoneum/retroperitoneum and mesentery: No free air. No organized fluid collection. No adenopathy.    Pelvic organs/Bladder: Unremarkable. Bladder is distended without     Abdominal wall: Unremarkable.  Bones and soft tissues: Mild multilevel degenerative changes of the spine.    IMPRESSION:    No CT evidence of active GI bleeding.    No CT findings of acute pancreatitis.    Small hiatal hernia or wall thickening of the distal thoracic esophagus.  Hypodense lesion measuring 2.5 x 1.4 cm within the fundal portion of the stomach. The findings are nonspecific, gastric ulcer or neoplasm considered Nonemergent endoscopic evaluation is advised to exclude underlying malignancy.     Additionalfindings as mentioned above.    These results were discussed via telephone at 5/20/2020 12:46 AM by Dr. Baron of radiology with Dr. Shields, institution read-back verification policy was followed.                 MARIELA BAORN M.D., ATTENDING RADIOLOGIST  This document has been electronically signed. May 20 2020 12:46AM                < end of copied text >  < from: CT Angio Abdomen and Pelvis w/ IV Cont (05.20.20 @ 00:15) >      PROCEDURE DATE:  05/20/2020        ith Consultants/Other Providers [ ] YES  [ ] NO

## 2020-05-21 NOTE — PROGRESS NOTE ADULT - SUBJECTIVE AND OBJECTIVE BOX
Patient is a 55y old  Male who presents with a chief complaint of nasuea/vomiting, EtOH (20 May 2020 12:32)      INTERVAL HPI/OVERNIGHT EVENTS: no events     MEDICATIONS  (STANDING):  LORazepam   Injectable   IV Push   pantoprazole  Injectable 40 milliGRAM(s) IV Push every 12 hours  sodium chloride 0.9%. 1000 milliLiter(s) (100 mL/Hr) IV Continuous <Continuous>    MEDICATIONS  (PRN):  acetaminophen   Tablet .. 650 milliGRAM(s) Oral every 6 hours PRN Mild Pain (1 - 3)  LORazepam   Injectable 2 milliGRAM(s) IV Push every 30 minutes PRN Agitation      Allergies    No Known Allergies    Intolerances       Vital Signs Last 24 Hrs  T(C): 37.3 (21 May 2020 11:01), Max: 37.3 (20 May 2020 17:11)  T(F): 99.2 (21 May 2020 11:01), Max: 99.2 (21 May 2020 11:01)  HR: 67 (21 May 2020 11:01) (58 - 80)  BP: 127/68 (21 May 2020 11:01) (111/69 - 128/78)  BP(mean): --  RR: 18 (21 May 2020 11:01) (18 - 18)  SpO2: 97% (21 May 2020 11:01) (97% - 97%)    PHYSICAL EXAM:  GENERAL: NAD, well-groomed, well-developed  HEAD:  Atraumatic, Normocephalic  EYES: EOMI, PERRLA, conjunctiva and sclera clear  ENMT: No tonsillar erythema, exudates, or enlargement; Moist mucous membranes, Good dentition, No lesions  NECK: Supple, No JVD, Normal thyroid  NERVOUS SYSTEM:  Alert & Oriented X3, Good concentration; Motor Strength 5/5 B/L upper and lower extremities; DTRs 2+ intact and symmetric  CHEST/LUNG: Clear to percussion bilaterally; No rales, rhonchi, wheezing, or rubs  HEART: Regular rate and rhythm; No murmurs, rubs, or gallops  ABDOMEN: Soft, Nontender, Nondistended; Bowel sounds present  EXTREMITIES:  2+ Peripheral Pulses, No clubbing, cyanosis, or edema  LYMPH: No lymphadenopathy noted  SKIN: No rashes or lesions    LABS:                        9.4    2.82  )-----------( 153      ( 21 May 2020 08:52 )             28.8     05-21    138  |  103  |  7   ----------------------------<  63<L>  3.7   |  24  |  0.61    Ca    7.9<L>      21 May 2020 07:58  Phos  1.9     05-21  Mg     2.0     05-21    TPro  6.7  /  Alb  3.1<L>  /  TBili  1.2  /  DBili  x   /  AST  54<H>  /  ALT  31  /  AlkPhos  46  05-21    PT/INR - ( 19 May 2020 21:17 )   PT: 10.8 sec;   INR: 0.97 ratio         PTT - ( 19 May 2020 21:17 )  PTT:26.0 sec    CAPILLARY BLOOD GLUCOSE          RADIOLOGY & ADDITIONAL TESTS:    Imaging Personally Reviewed:  [ X] YES  [ ] NO    Consultant(s) Notes Reviewed:  [ X] YES  [ ] NO    Care Discussed with Consultants/Other Providers [X ] YES  [ ] NO

## 2020-05-21 NOTE — PROGRESS NOTE ADULT - ASSESSMENT
HPI:  Patient is a 55M with a PMH of chronic ETOH abuse with hx of alcohol withdrawal with frequent hospital admissions, GERD, HLD, alcoholic gastritis/esophagitis, PUD, hx of hypoxic respiratory failure requiring intubation due to aspiration PNA, presents for abdominal pain and blood streaked vomiting.  Patient is a poor historian, recently received IV ativan.  ED attending reports hx of abdominal pain, nausea, and vomiting.  Tachycardia resolved with fluid in ED.  .  Significant lactic acidosis.  CT abdomen performed and found no acute pathology.  Evaled by ICU and deemed appropriate for tele.  Will admit to tele, GI eval pending. (20 May 2020 04:07)  --------------- As Above ---------------------------------------------- Patient seen earlier  Patient is a poor historian ( lethargy ) . Patient known to my service. Patient presented with abdominal pain, and vomiting blood streaked vomitus. HGB dropped in ER.   In ER, patient had no N/V or BMs.  Patient known to my servie. Multiple admissions for abdominal pain, N/V and upper GI bleed. Had 2 EGDs / past year. ( Esophageal ulcer ) CT scan revealed ? ulcer VS mass.     Upper GI bleed c/w MW tear, r/o stomach neoplasm VS ulcer seen on CT scan  - 12.8 --> 8.8--> 9.3 --> 9.4  1) PPI 2) Slowly advance diet as tolerated 3) Zofran PRN 4) F/U labs 5)  EGD Friday - consent obtained

## 2020-05-22 LAB
ANION GAP SERPL CALC-SCNC: 12 MMOL/L — SIGNIFICANT CHANGE UP (ref 5–17)
BUN SERPL-MCNC: 6 MG/DL — LOW (ref 7–23)
CALCIUM SERPL-MCNC: 8.4 MG/DL — LOW (ref 8.5–10.1)
CHLORIDE SERPL-SCNC: 103 MMOL/L — SIGNIFICANT CHANGE UP (ref 96–108)
CO2 SERPL-SCNC: 21 MMOL/L — LOW (ref 22–31)
CREAT SERPL-MCNC: 0.63 MG/DL — SIGNIFICANT CHANGE UP (ref 0.5–1.3)
GLUCOSE SERPL-MCNC: 69 MG/DL — LOW (ref 70–99)
HCT VFR BLD CALC: 29.8 % — LOW (ref 39–50)
HGB BLD-MCNC: 9.8 G/DL — LOW (ref 13–17)
MCHC RBC-ENTMCNC: 28.7 PG — SIGNIFICANT CHANGE UP (ref 27–34)
MCHC RBC-ENTMCNC: 32.9 GM/DL — SIGNIFICANT CHANGE UP (ref 32–36)
MCV RBC AUTO: 87.1 FL — SIGNIFICANT CHANGE UP (ref 80–100)
NRBC # BLD: 0 /100 WBCS — SIGNIFICANT CHANGE UP (ref 0–0)
PLATELET # BLD AUTO: 137 K/UL — LOW (ref 150–400)
POTASSIUM SERPL-MCNC: 3.6 MMOL/L — SIGNIFICANT CHANGE UP (ref 3.5–5.3)
POTASSIUM SERPL-SCNC: 3.6 MMOL/L — SIGNIFICANT CHANGE UP (ref 3.5–5.3)
RBC # BLD: 3.42 M/UL — LOW (ref 4.2–5.8)
RBC # FLD: 14.7 % — HIGH (ref 10.3–14.5)
SARS-COV-2 RNA SPEC QL NAA+PROBE: SIGNIFICANT CHANGE UP
SODIUM SERPL-SCNC: 136 MMOL/L — SIGNIFICANT CHANGE UP (ref 135–145)
WBC # BLD: 3.2 K/UL — LOW (ref 3.8–10.5)
WBC # FLD AUTO: 3.2 K/UL — LOW (ref 3.8–10.5)

## 2020-05-22 PROCEDURE — 99232 SBSQ HOSP IP/OBS MODERATE 35: CPT

## 2020-05-22 RX ORDER — PANTOPRAZOLE SODIUM 20 MG/1
40 TABLET, DELAYED RELEASE ORAL
Refills: 0 | Status: DISCONTINUED | OUTPATIENT
Start: 2020-05-22 | End: 2020-05-23

## 2020-05-22 RX ADMIN — PANTOPRAZOLE SODIUM 40 MILLIGRAM(S): 20 TABLET, DELAYED RELEASE ORAL at 18:17

## 2020-05-22 RX ADMIN — Medication 2 MILLIGRAM(S): at 22:36

## 2020-05-22 RX ADMIN — Medication 2 MILLIGRAM(S): at 16:21

## 2020-05-22 RX ADMIN — Medication 2 MILLIGRAM(S): at 18:15

## 2020-05-22 RX ADMIN — Medication 1.5 MILLIGRAM(S): at 05:35

## 2020-05-22 RX ADMIN — Medication 2 MILLIGRAM(S): at 04:08

## 2020-05-22 RX ADMIN — SODIUM CHLORIDE 100 MILLILITER(S): 9 INJECTION INTRAMUSCULAR; INTRAVENOUS; SUBCUTANEOUS at 05:38

## 2020-05-22 RX ADMIN — Medication 1.5 MILLIGRAM(S): at 12:11

## 2020-05-22 RX ADMIN — Medication 1.5 MILLIGRAM(S): at 17:32

## 2020-05-22 RX ADMIN — PANTOPRAZOLE SODIUM 40 MILLIGRAM(S): 20 TABLET, DELAYED RELEASE ORAL at 05:37

## 2020-05-22 RX ADMIN — Medication 2 MILLIGRAM(S): at 09:57

## 2020-05-22 NOTE — PROGRESS NOTE ADULT - SUBJECTIVE AND OBJECTIVE BOX
Patient is a 55y old  Male who presents with a chief complaint of nasuea/vomiting, EtOH (20 May 2020 12:32)      INTERVAL HPI/OVERNIGHT EVENTS: no events EGD today     Intolerances    T(C): 36.6 (05-22-20 @ 05:42), Max: 36.8 (05-22-20 @ 00:54)  HR: 64 (05-22-20 @ 05:42) (62 - 64)  BP: 125/80 (05-22-20 @ 05:42) (125/80 - 128/88)  RR: 18 (05-22-20 @ 05:42) (18 - 18)  SpO2: 98% (05-22-20 @ 05:42) (98% - 99%)      PHYSICAL EXAM:  GENERAL: NAD, well-groomed, well-developed  HEAD:  Atraumatic, Normocephalic  EYES: EOMI, conjunctiva and sclera clear  NECK: Supple, No JVD, Normal thyroid  NERVOUS SYSTEM:  Alert & Oriented X3, Good concentration; Motor Strength 5/5 B/L upper and lower extremities; DTRs 2+ intact and symmetric  CHEST/LUNG: Clear to percussion bilaterally; No rales, rhonchi, wheezing, or rubs  HEART: Regular rate and rhythm; No murmurs, rubs, or gallops  ABDOMEN: Soft, Nontender, Nondistended; Bowel sounds present  EXTREMITIES:  2+ Peripheral Pulses, No clubbing, cyanosis, or edema  LYMPH: No lymphadenopathy noted  SKIN: No rashes or lesions    LABS:                        9.4    2.82  )-----------( 153      ( 21 May 2020 08:52 )             28.8     05-21    138  |  103  |  7   ----------------------------<  63<L>  3.7   |  24  |  0.61    Ca    7.9<L>      21 May 2020 07:58  Phos  1.9     05-21  Mg     2.0     05-21    TPro  6.7  /  Alb  3.1<L>  /  TBili  1.2  /  DBili  x   /  AST  54<H>  /  ALT  31  /  AlkPhos  46  05-21    PT/INR - ( 19 May 2020 21:17 )   PT: 10.8 sec;   INR: 0.97 ratio         PTT - ( 19 May 2020 21:17 )  PTT:26.0 sec    CAPILLARY BLOOD GLUCOSE          RADIOLOGY & ADDITIONAL TESTS:    Imaging Personally Reviewed:  [ X] YES  [ ] NO    Consultant(s) Notes Reviewed:  [ X] YES  [ ] NO    Care Discussed with Consultants/Other Providers [X ] YES  [ ] NO

## 2020-05-22 NOTE — PROGRESS NOTE ADULT - ASSESSMENT
HPI:  Patient is a 55M with a PMH of chronic ETOH abuse with hx of alcohol withdrawal with frequent hospital admissions, GERD, HLD, alcoholic gastritis/esophagitis, PUD, hx of hypoxic respiratory failure requiring intubation due to aspiration PNA, presents for abdominal pain and blood streaked vomiting.  Patient is a poor historian, recently received IV ativan.  ED attending reports hx of abdominal pain, nausea, and vomiting.  Tachycardia resolved with fluid in ED.  .  Significant lactic acidosis.  CT abdomen performed and found no acute pathology.  Evaled by ICU and deemed appropriate for tele.  Will admit to tele, GI eval pending. (20 May 2020 04:07)  --------------- As Above ---------------------------------------------- Patient seen earlier  Patient is a poor historian ( lethargy ) . Patient known to my service. Patient presented with abdominal pain, and vomiting blood streaked vomitus. HGB dropped in ER.   In ER, patient had no N/V or BMs.  Patient known to my servie. Multiple admissions for abdominal pain, N/V and upper GI bleed. Had 2 EGDs / past year. ( Esophageal ulcer ) CT scan revealed ? ulcer VS mass.     Upper GI bleed c/w MW tear, r/o stomach neoplasm VS ulcer seen on CT scan. EGD postponed to Tuesday ( no IV access, Monday is a holiday )   - 12.8 --> 8.8--> 9.3 --> 9.4 --> 9.8 1) PPI 2) Slowly advance diet as tolerated 3) Zofran PRN 4) F/U labs

## 2020-05-22 NOTE — PROGRESS NOTE ADULT - PROBLEM SELECTOR PLAN 1
w acute blood loss anemia -Upper GI bleed c/w MW tear, r/o stomach neoplasm VS ulcer seen on CT scan   ppi  EGD on Friday.   Monitor CBC.

## 2020-05-22 NOTE — PROGRESS NOTE ADULT - SUBJECTIVE AND OBJECTIVE BOX
Patient is a 55y old  Male who presents with a chief complaint of nasuea/vomiting, EtOH (22 May 2020 10:07)      HPI:  Patient is a 55M with a PMH of chronic ETOH abuse with hx of alcohol withdrawal with frequent hospital admissions, GERD, HLD, alcoholic gastritis/esophagitis, PUD, hx of hypoxic respiratory failure requiring intubation due to aspiration PNA, presents for abdominal pain and blood streaked vomiting.  Patient is a poor historian, recently received IV ativan.  ED attending reports hx of abdominal pain, nausea, and vomiting.  Tachycardia resolved with fluid in ED.  .  Significant lactic acidosis.  CT abdomen performed and found no acute pathology.  Evaled by ICU and deemed appropriate for tele.  Will admit to tele, GI eval pending. (20 May 2020 04:07)      INTERVAL HPI/OVERNIGHT EVENTS:  EGD cancelled ( postponed ) for lack of IV access. The patient denies melena, hematochezia, hematemesis, nausea, vomiting, abdominal pain, constipation, diarrhea, or change in bowel movements     MEDICATIONS  (STANDING):  LORazepam   Injectable 1.5 milliGRAM(s) IV Push every 6 hours  LORazepam   Injectable   IV Push   pantoprazole  Injectable 40 milliGRAM(s) IV Push every 12 hours  sodium chloride 0.9%. 1000 milliLiter(s) (100 mL/Hr) IV Continuous <Continuous>    MEDICATIONS  (PRN):  acetaminophen   Tablet .. 650 milliGRAM(s) Oral every 6 hours PRN Mild Pain (1 - 3)  LORazepam   Injectable 2 milliGRAM(s) IV Push every 30 minutes PRN Agitation      FAMILY HISTORY:  No pertinent family history in first degree relatives (Father, Mother)      Allergies    No Known Allergies    Intolerances        PMH/PSH:  DTs (delirium tremens)  Substance abuse  Gastritis  EtOH dependence  Mixed hyperlipidemia  PUD (peptic ulcer disease)  Alcohol abuse  No significant past surgical history        REVIEW OF SYSTEMS:  CONSTITUTIONAL: No fever, weight loss,   EYES: No eye pain, visual disturbances, or discharge  ENMT:  No difficulty hearing, tinnitus, vertigo; No sinus or throat pain  NECK: No pain or stiffness  BREASTS: No pain, masses, or nipple discharge  RESPIRATORY: No cough, wheezing, chills or hemoptysis; No shortness of breath  CARDIOVASCULAR: No chest pain, palpitations, dizziness, or leg swelling  GASTROINTESTINAL:  see above   GENITOURINARY: No dysuria, frequency, hematuria, or incontinence  NEUROLOGICAL: No headaches, memory loss, loss of strength, numbness, or tremors  SKIN: No itching, burning, rashes, or lesions   LYMPH NODES: No enlarged glands  ENDOCRINE: No heat or cold intolerance; No hair loss  MUSCULOSKELETAL: No joint pain or swelling; No muscle, back, or extremity pain  PSYCHIATRIC: No depression, anxiety, mood swings, or difficulty sleeping  HEME/LYMPH: No easy bruising, or bleeding gums  ALLERGY AND IMMUNOLOGIC: No hives or eczema    Vital Signs Last 24 Hrs  T(C): 36.6 (22 May 2020 17:00), Max: 36.8 (22 May 2020 00:54)  T(F): 97.8 (22 May 2020 17:00), Max: 98.3 (22 May 2020 00:54)  HR: 101 (22 May 2020 17:00) (62 - 115)  BP: 129/89 (22 May 2020 17:00) (125/80 - 150/93)  BP(mean): --  RR: 17 (22 May 2020 17:00) (17 - 19)  SpO2: 98% (22 May 2020 17:00) (98% - 100%)    PHYSICAL EXAM:  GENERAL: NAD, well-groomed, well-developed  HEAD:  Atraumatic, Normocephalic  EYES: EOMI, PERRLA, conjunctiva and sclera clear  NECK: Supple, No JVD, Normal thyroid  NERVOUS SYSTEM:  Alert & Oriented X3, Good concentration; Motor Strength 5/5 B/L upper and lower extremities;  CHEST/LUNG: Clear to percussion bilaterally; No rales, rhonchi, wheezing, or rubs  HEART: Regular rate and rhythm; No murmurs, rubs, or gallops  ABDOMEN: Soft, Nontender, Nondistended; Bowel sounds present  EXTREMITIES:  2+ Peripheral Pulses, No clubbing, cyanosis, or edema  LYMPH: No lymphadenopathy noted  SKIN: No rashes or lesions    LAB  05-19 @ 21:17  amylase --   lipase 193                           9.8    3.20  )-----------( 137      ( 22 May 2020 06:20 )             29.8       CBC:  05-22 @ 06:20  WBC 3.20   Hgb 9.8   Hct 29.8   Plts 137  MCV 87.1  05-21 @ 08:52  WBC 2.82   Hgb 9.4   Hct 28.8   Plts 153  MCV 86.0  05-20 @ 12:33  WBC 2.38   Hgb 9.3   Hct 27.9   Plts 156  MCV 85.6  05-20 @ 08:37  WBC 3.54   Hgb 8.8   Hct 27.1   Plts 163  MCV 86.6  05-19 @ 21:17  WBC 2.65   Hgb 12.8   Hct 38.0   Plts 262  MCV 83.0      Chemistry:  05-22 @ 06:20  Na+ 136  K+ 3.6  Cl- 103  CO2 21  BUN 6  Cr 0.63     05-21 @ 07:58  Na+ 138  K+ 3.7  Cl- 103  CO2 24  BUN 7  Cr 0.61     05-20 @ 10:06  Na+ 142  K+ 4.0  Cl- 110  CO2 22  BUN 10  Cr 0.67     05-19 @ 21:17  Na+ 139  K+ 3.7  Cl- 99  CO2 15  BUN 11  Cr 0.98         Glucose, Serum: 69 mg/dL (05-22 @ 06:20)  Glucose, Serum: 63 mg/dL (05-21 @ 07:58)  Glucose, Serum: 65 mg/dL (05-20 @ 10:06)  Glucose, Serum: 83 mg/dL (05-19 @ 21:17)      22 May 2020 06:20    136    |  103    |  6      ----------------------------<  69     3.6     |  21     |  0.63   21 May 2020 07:58    138    |  103    |  7      ----------------------------<  63     3.7     |  24     |  0.61   20 May 2020 10:06    142    |  110    |  10     ----------------------------<  65     4.0     |  22     |  0.67   19 May 2020 21:17    139    |  99     |  11     ----------------------------<  83     3.7     |  15     |  0.98     Ca    8.4        22 May 2020 06:20  Ca    7.9        21 May 2020 07:58  Ca    7.1        20 May 2020 10:06  Ca    8.6        19 May 2020 21:17  Phos  1.9       21 May 2020 16:20  Phos  1.9       21 May 2020 07:58  Phos  2.2       20 May 2020 10:06  Mg     2.0       21 May 2020 07:58  Mg     1.4       20 May 2020 10:06    TPro  6.7    /  Alb  3.1    /  TBili  1.2    /  DBili  x      /  AST  54     /  ALT  31     /  AlkPhos  46     21 May 2020 07:58  TPro  6.4    /  Alb  3.0    /  TBili  0.4    /  DBili  x      /  AST  51     /  ALT  31     /  AlkPhos  38     20 May 2020 10:06  TPro  8.7    /  Alb  3.9    /  TBili  0.3    /  DBili  x      /  AST  65     /  ALT  37     /  AlkPhos  59     19 May 2020 21:17              CAPILLARY BLOOD GLUCOSE              RADIOLOGY & ADDITIONAL TESTS:    Imaging Personally Reviewed:  [ ] YES  [ ] NO    Consultant(s) Notes Reviewed:  [ ] YES  [ ] NO    Care Discussed with Consultants/Other Providers [ ] YES  [ ] NO

## 2020-05-23 LAB
ALBUMIN SERPL ELPH-MCNC: 3.7 G/DL — SIGNIFICANT CHANGE UP (ref 3.3–5)
ALP SERPL-CCNC: 59 U/L — SIGNIFICANT CHANGE UP (ref 40–120)
ALT FLD-CCNC: 34 U/L — SIGNIFICANT CHANGE UP (ref 12–78)
ANION GAP SERPL CALC-SCNC: 10 MMOL/L — SIGNIFICANT CHANGE UP (ref 5–17)
AST SERPL-CCNC: 45 U/L — HIGH (ref 15–37)
BILIRUB DIRECT SERPL-MCNC: 0.34 MG/DL — HIGH (ref 0.05–0.2)
BILIRUB INDIRECT FLD-MCNC: 0.9 MG/DL — SIGNIFICANT CHANGE UP (ref 0.2–1)
BILIRUB SERPL-MCNC: 1.2 MG/DL — SIGNIFICANT CHANGE UP (ref 0.2–1.2)
BUN SERPL-MCNC: 6 MG/DL — LOW (ref 7–23)
CALCIUM SERPL-MCNC: 9.5 MG/DL — SIGNIFICANT CHANGE UP (ref 8.5–10.1)
CHLORIDE SERPL-SCNC: 104 MMOL/L — SIGNIFICANT CHANGE UP (ref 96–108)
CO2 SERPL-SCNC: 23 MMOL/L — SIGNIFICANT CHANGE UP (ref 22–31)
CREAT SERPL-MCNC: 0.87 MG/DL — SIGNIFICANT CHANGE UP (ref 0.5–1.3)
GLUCOSE SERPL-MCNC: 104 MG/DL — HIGH (ref 70–99)
MAGNESIUM SERPL-MCNC: 1.7 MG/DL — SIGNIFICANT CHANGE UP (ref 1.6–2.6)
PHOSPHATE SERPL-MCNC: 2 MG/DL — LOW (ref 2.5–4.5)
POTASSIUM SERPL-MCNC: 3.8 MMOL/L — SIGNIFICANT CHANGE UP (ref 3.5–5.3)
POTASSIUM SERPL-SCNC: 3.8 MMOL/L — SIGNIFICANT CHANGE UP (ref 3.5–5.3)
PROT SERPL-MCNC: 8.1 GM/DL — SIGNIFICANT CHANGE UP (ref 6–8.3)
SODIUM SERPL-SCNC: 137 MMOL/L — SIGNIFICANT CHANGE UP (ref 135–145)

## 2020-05-23 PROCEDURE — 99232 SBSQ HOSP IP/OBS MODERATE 35: CPT

## 2020-05-23 RX ORDER — PHENOBARBITAL 60 MG
32.4 TABLET ORAL EVERY 8 HOURS
Refills: 0 | Status: DISCONTINUED | OUTPATIENT
Start: 2020-05-25 | End: 2020-05-25

## 2020-05-23 RX ORDER — PHENOBARBITAL 60 MG
64.8 TABLET ORAL EVERY 8 HOURS
Refills: 0 | Status: DISCONTINUED | OUTPATIENT
Start: 2020-05-24 | End: 2020-05-24

## 2020-05-23 RX ORDER — PHENOBARBITAL 60 MG
130 TABLET ORAL ONCE
Refills: 0 | Status: DISCONTINUED | OUTPATIENT
Start: 2020-05-23 | End: 2020-05-23

## 2020-05-23 RX ORDER — PHENOBARBITAL 60 MG
130 TABLET ORAL EVERY 8 HOURS
Refills: 0 | Status: DISCONTINUED | OUTPATIENT
Start: 2020-05-23 | End: 2020-05-23

## 2020-05-23 RX ORDER — MIDAZOLAM HYDROCHLORIDE 1 MG/ML
4 INJECTION, SOLUTION INTRAMUSCULAR; INTRAVENOUS ONCE
Refills: 0 | Status: DISCONTINUED | OUTPATIENT
Start: 2020-05-23 | End: 2020-05-23

## 2020-05-23 RX ORDER — PANTOPRAZOLE SODIUM 20 MG/1
40 TABLET, DELAYED RELEASE ORAL EVERY 12 HOURS
Refills: 0 | Status: DISCONTINUED | OUTPATIENT
Start: 2020-05-23 | End: 2020-05-25

## 2020-05-23 RX ORDER — PHENOBARBITAL 60 MG
130 TABLET ORAL EVERY 8 HOURS
Refills: 0 | Status: DISCONTINUED | OUTPATIENT
Start: 2020-05-23 | End: 2020-05-24

## 2020-05-23 RX ORDER — PHENOBARBITAL 60 MG
16.2 TABLET ORAL EVERY 8 HOURS
Refills: 0 | Status: DISCONTINUED | OUTPATIENT
Start: 2020-05-26 | End: 2020-05-26

## 2020-05-23 RX ORDER — POTASSIUM PHOSPHATE, MONOBASIC POTASSIUM PHOSPHATE, DIBASIC 236; 224 MG/ML; MG/ML
15 INJECTION, SOLUTION INTRAVENOUS ONCE
Refills: 0 | Status: COMPLETED | OUTPATIENT
Start: 2020-05-23 | End: 2020-05-23

## 2020-05-23 RX ADMIN — PANTOPRAZOLE SODIUM 40 MILLIGRAM(S): 20 TABLET, DELAYED RELEASE ORAL at 16:26

## 2020-05-23 RX ADMIN — SODIUM CHLORIDE 100 MILLILITER(S): 9 INJECTION INTRAMUSCULAR; INTRAVENOUS; SUBCUTANEOUS at 07:24

## 2020-05-23 RX ADMIN — Medication 2 MILLIGRAM(S): at 07:53

## 2020-05-23 RX ADMIN — PANTOPRAZOLE SODIUM 40 MILLIGRAM(S): 20 TABLET, DELAYED RELEASE ORAL at 04:43

## 2020-05-23 RX ADMIN — Medication 2 MILLIGRAM(S): at 23:45

## 2020-05-23 RX ADMIN — Medication 2 MILLIGRAM(S): at 03:11

## 2020-05-23 RX ADMIN — Medication 130 MILLIGRAM(S): at 21:04

## 2020-05-23 RX ADMIN — Medication 130 MILLIGRAM(S): at 15:30

## 2020-05-23 RX ADMIN — Medication 2 MILLIGRAM(S): at 19:54

## 2020-05-23 RX ADMIN — Medication 1.5 MILLIGRAM(S): at 00:06

## 2020-05-23 RX ADMIN — MIDAZOLAM HYDROCHLORIDE 4 MILLIGRAM(S): 1 INJECTION, SOLUTION INTRAMUSCULAR; INTRAVENOUS at 03:52

## 2020-05-23 RX ADMIN — Medication 2 MILLIGRAM(S): at 09:04

## 2020-05-23 RX ADMIN — Medication 2 MILLIGRAM(S): at 14:52

## 2020-05-23 RX ADMIN — POTASSIUM PHOSPHATE, MONOBASIC POTASSIUM PHOSPHATE, DIBASIC 62.5 MILLIMOLE(S): 236; 224 INJECTION, SOLUTION INTRAVENOUS at 12:26

## 2020-05-23 RX ADMIN — Medication 130 MILLIGRAM(S): at 09:42

## 2020-05-23 RX ADMIN — Medication 1.5 MILLIGRAM(S): at 05:09

## 2020-05-23 RX ADMIN — Medication 1.5 MILLIGRAM(S): at 11:26

## 2020-05-23 NOTE — PROGRESS NOTE ADULT - PROBLEM SELECTOR PLAN 1
w acute blood loss anemia -Upper GI bleed c/w MW tear, r/o stomach neoplasm VS ulcer seen on CT scan   ppi  EGD Tuesday.   Monitor CBC.

## 2020-05-23 NOTE — PROGRESS NOTE ADULT - SUBJECTIVE AND OBJECTIVE BOX
Patient is a 55y old  Male who presents with a chief complaint of nasuea/vomiting, EtOH (20 May 2020 12:32)      INTERVAL HPI/OVERNIGHT EVENTS: patient feels ok.   Intolerances    T(C): 36.5 (05-23-20 @ 10:26), Max: 36.7 (05-23-20 @ 05:28)  HR: 90 (05-23-20 @ 10:26) (79 - 90)  BP: 117/65 (05-23-20 @ 10:26) (117/65 - 122/84)  RR: 18 (05-23-20 @ 10:26) (18 - 20)  SpO2: 99% (05-23-20 @ 10:26) (97% - 99%)    PHYSICAL EXAM:  GENERAL: NAD, well-groomed, well-developed  HEAD:  Atraumatic, Normocephalic  EYES: EOMI, conjunctiva and sclera clear  NECK: Supple, No JVD, Normal thyroid  NERVOUS SYSTEM:  Alert & Oriented X3, Good concentration; Motor Strength 5/5 B/L upper and lower extremities; DTRs 2+ intact and symmetric  CHEST/LUNG: Clear to percussion bilaterally; No rales, rhonchi, wheezing, or rubs  HEART: Regular rate and rhythm; No murmurs, rubs, or gallops  ABDOMEN: Soft, Nontender, Nondistended; Bowel sounds present  EXTREMITIES:  2+ Peripheral Pulses, No clubbing, cyanosis, or edema  LYMPH: No lymphadenopathy noted  SKIN: No rashes or lesions                          9.8    3.20  )-----------( 137      ( 22 May 2020 06:20 )             29.8           LIVER FUNCTIONS - ( 23 May 2020 03:53 )  Alb: 3.7 g/dL / Pro: 8.1 gm/dL / ALK PHOS: 59 U/L / ALT: 34 U/L / AST: 45 U/L / GGT: x             137|104|6<104  3.8|23|0.87  9.5,1.7,2.0  05-23 @ 03:53    CAPILLARY BLOOD GLUCOSE          RADIOLOGY & ADDITIONAL TESTS:    Imaging Personally Reviewed:  [ X] YES  [ ] NO    Consultant(s) Notes Reviewed:  [ X] YES  [ ] NO    Care Discussed with Consultants/Other Providers [X ] YES  [ ] NO

## 2020-05-24 LAB
ANION GAP SERPL CALC-SCNC: 6 MMOL/L — SIGNIFICANT CHANGE UP (ref 5–17)
BUN SERPL-MCNC: 8 MG/DL — SIGNIFICANT CHANGE UP (ref 7–23)
CALCIUM SERPL-MCNC: 8.5 MG/DL — SIGNIFICANT CHANGE UP (ref 8.5–10.1)
CHLORIDE SERPL-SCNC: 110 MMOL/L — HIGH (ref 96–108)
CO2 SERPL-SCNC: 23 MMOL/L — SIGNIFICANT CHANGE UP (ref 22–31)
CREAT SERPL-MCNC: 0.66 MG/DL — SIGNIFICANT CHANGE UP (ref 0.5–1.3)
GLUCOSE SERPL-MCNC: 93 MG/DL — SIGNIFICANT CHANGE UP (ref 70–99)
HCT VFR BLD CALC: 28.5 % — LOW (ref 39–50)
HGB BLD-MCNC: 9.5 G/DL — LOW (ref 13–17)
MCHC RBC-ENTMCNC: 28.5 PG — SIGNIFICANT CHANGE UP (ref 27–34)
MCHC RBC-ENTMCNC: 33.3 GM/DL — SIGNIFICANT CHANGE UP (ref 32–36)
MCV RBC AUTO: 85.6 FL — SIGNIFICANT CHANGE UP (ref 80–100)
NRBC # BLD: 0 /100 WBCS — SIGNIFICANT CHANGE UP (ref 0–0)
PHOSPHATE SERPL-MCNC: 3.5 MG/DL — SIGNIFICANT CHANGE UP (ref 2.5–4.5)
PLATELET # BLD AUTO: 127 K/UL — LOW (ref 150–400)
POTASSIUM SERPL-MCNC: 3.4 MMOL/L — LOW (ref 3.5–5.3)
POTASSIUM SERPL-SCNC: 3.4 MMOL/L — LOW (ref 3.5–5.3)
RBC # BLD: 3.33 M/UL — LOW (ref 4.2–5.8)
RBC # FLD: 15 % — HIGH (ref 10.3–14.5)
SARS-COV-2 RNA SPEC QL NAA+PROBE: SIGNIFICANT CHANGE UP
SODIUM SERPL-SCNC: 139 MMOL/L — SIGNIFICANT CHANGE UP (ref 135–145)
WBC # BLD: 2.5 K/UL — LOW (ref 3.8–10.5)
WBC # FLD AUTO: 2.5 K/UL — LOW (ref 3.8–10.5)

## 2020-05-24 RX ADMIN — Medication 130 MILLIGRAM(S): at 05:48

## 2020-05-24 RX ADMIN — Medication 2 MILLIGRAM(S): at 01:19

## 2020-05-24 RX ADMIN — PANTOPRAZOLE SODIUM 40 MILLIGRAM(S): 20 TABLET, DELAYED RELEASE ORAL at 17:35

## 2020-05-24 RX ADMIN — PANTOPRAZOLE SODIUM 40 MILLIGRAM(S): 20 TABLET, DELAYED RELEASE ORAL at 05:48

## 2020-05-24 RX ADMIN — SODIUM CHLORIDE 100 MILLILITER(S): 9 INJECTION INTRAMUSCULAR; INTRAVENOUS; SUBCUTANEOUS at 08:32

## 2020-05-24 RX ADMIN — SODIUM CHLORIDE 100 MILLILITER(S): 9 INJECTION INTRAMUSCULAR; INTRAVENOUS; SUBCUTANEOUS at 22:33

## 2020-05-24 RX ADMIN — Medication 64.8 MILLIGRAM(S): at 17:36

## 2020-05-24 RX ADMIN — Medication 64.8 MILLIGRAM(S): at 23:45

## 2020-05-24 RX ADMIN — Medication 64.8 MILLIGRAM(S): at 08:32

## 2020-05-24 RX ADMIN — Medication 130 MILLIGRAM(S): at 13:28

## 2020-05-24 RX ADMIN — SODIUM CHLORIDE 100 MILLILITER(S): 9 INJECTION INTRAMUSCULAR; INTRAVENOUS; SUBCUTANEOUS at 17:36

## 2020-05-24 RX ADMIN — SODIUM CHLORIDE 100 MILLILITER(S): 9 INJECTION INTRAMUSCULAR; INTRAVENOUS; SUBCUTANEOUS at 01:17

## 2020-05-24 NOTE — PROGRESS NOTE ADULT - SUBJECTIVE AND OBJECTIVE BOX
Patient is a 55y old  Male who presents with a chief complaint of nasuea/vomiting, EtOH (20 May 2020 12:32)      INTERVAL HPI/OVERNIGHT EVENTS: patient feels ok.   Intolerances    T(C): 36.1 (05-24-20 @ 05:39), Max: 36.4 (05-23-20 @ 23:36)  HR: 74 (05-24-20 @ 05:39) (74 - 86)  BP: 119/78 (05-24-20 @ 05:39) (112/67 - 119/78)  RR: 18 (05-24-20 @ 05:39) (18 - 18)  SpO2: 99% (05-24-20 @ 05:39) (99% - 99%)      MEDICATIONS  (STANDING):  pantoprazole  Injectable 40 milliGRAM(s) IV Push every 12 hours  PHENobarbital Injectable 130 milliGRAM(s) IV Push every 8 hours  PHENobarbital Injectable 64.8 milliGRAM(s) IV Push every 8 hours  sodium chloride 0.9%. 1000 milliLiter(s) (100 mL/Hr) IV Continuous <Continuous>    MEDICATIONS  (PRN):  acetaminophen   Tablet .. 650 milliGRAM(s) Oral every 6 hours PRN Mild Pain (1 - 3)  LORazepam   Injectable 2 milliGRAM(s) IV Push every 1 hour PRN Symptom-triggered: each CIWA -Ar score 8 or GREATER      PHYSICAL EXAM:  GENERAL: NAD, well-groomed, well-developed  HEAD:  Atraumatic, Normocephalic  EYES: EOMI, conjunctiva and sclera clear  NECK: Supple,   NERVOUS SYSTEM:  Alert & Oriented X3, Good concentration; Motor Strength 5/5 B/L upper and lower extremities; DTRs 2+ intact and symmetric  CHEST/LUNG: Clear to percussion bilaterally; No rales, rhonchi, wheezing, or rubs  HEART: Regular rate and rhythm; No murmurs, rubs, or gallops  ABDOMEN: Soft, Nontender, Nondistended; Bowel sounds present  EXTREMITIES:  2+ Peripheral Pulses, No clubbing, cyanosis, or edema  LYMPH: No lymphadenopathy noted  SKIN: No rashes or lesions                                           9.5    2.50  )-----------( 127      ( 24 May 2020 06:41 )             28.5           LIVER FUNCTIONS - ( 23 May 2020 03:53 )  Alb: 3.7 g/dL / Pro: 8.1 gm/dL / ALK PHOS: 59 U/L / ALT: 34 U/L / AST: 45 U/L / GGT: x             139|110|8<93  3.4|23|0.66  8.5,--,3.5  05-24 @ 06:41    CAPILLARY BLOOD GLUCOSE        CAPILLARY BLOOD GLUCOSE        CAPILLARY BLOOD GLUCOSE          RADIOLOGY & ADDITIONAL TESTS:    Imaging Personally Reviewed:  [ X] YES  [ ] NO    Consultant(s) Notes Reviewed:  [ X] YES  [ ] NO    Care Discussed with Consultants/Other Providers [X ] YES  [ ] NO

## 2020-05-24 NOTE — PROGRESS NOTE ADULT - PROBLEM SELECTOR PLAN 2
- continue CIWA protocols started on Phenobarb taper.   - Cont MVI, folate, thiamine  - Drinking cessation strongly advised.

## 2020-05-25 ENCOUNTER — TRANSCRIPTION ENCOUNTER (OUTPATIENT)
Age: 55
End: 2020-05-25

## 2020-05-25 LAB
ANION GAP SERPL CALC-SCNC: 9 MMOL/L — SIGNIFICANT CHANGE UP (ref 5–17)
BUN SERPL-MCNC: 8 MG/DL — SIGNIFICANT CHANGE UP (ref 7–23)
CALCIUM SERPL-MCNC: 8.5 MG/DL — SIGNIFICANT CHANGE UP (ref 8.5–10.1)
CHLORIDE SERPL-SCNC: 108 MMOL/L — SIGNIFICANT CHANGE UP (ref 96–108)
CO2 SERPL-SCNC: 22 MMOL/L — SIGNIFICANT CHANGE UP (ref 22–31)
CREAT SERPL-MCNC: 0.63 MG/DL — SIGNIFICANT CHANGE UP (ref 0.5–1.3)
GLUCOSE SERPL-MCNC: 84 MG/DL — SIGNIFICANT CHANGE UP (ref 70–99)
HCT VFR BLD CALC: 27.5 % — LOW (ref 39–50)
HGB BLD-MCNC: 9.2 G/DL — LOW (ref 13–17)
MCHC RBC-ENTMCNC: 28.5 PG — SIGNIFICANT CHANGE UP (ref 27–34)
MCHC RBC-ENTMCNC: 33.5 GM/DL — SIGNIFICANT CHANGE UP (ref 32–36)
MCV RBC AUTO: 85.1 FL — SIGNIFICANT CHANGE UP (ref 80–100)
NRBC # BLD: 0 /100 WBCS — SIGNIFICANT CHANGE UP (ref 0–0)
PLATELET # BLD AUTO: 144 K/UL — LOW (ref 150–400)
POTASSIUM SERPL-MCNC: 3.3 MMOL/L — LOW (ref 3.5–5.3)
POTASSIUM SERPL-SCNC: 3.3 MMOL/L — LOW (ref 3.5–5.3)
RBC # BLD: 3.23 M/UL — LOW (ref 4.2–5.8)
RBC # FLD: 15.1 % — HIGH (ref 10.3–14.5)
SODIUM SERPL-SCNC: 139 MMOL/L — SIGNIFICANT CHANGE UP (ref 135–145)
WBC # BLD: 2.79 K/UL — LOW (ref 3.8–10.5)
WBC # FLD AUTO: 2.79 K/UL — LOW (ref 3.8–10.5)

## 2020-05-25 RX ORDER — POTASSIUM CHLORIDE 20 MEQ
40 PACKET (EA) ORAL ONCE
Refills: 0 | Status: COMPLETED | OUTPATIENT
Start: 2020-05-25 | End: 2020-05-25

## 2020-05-25 RX ORDER — PANTOPRAZOLE SODIUM 20 MG/1
40 TABLET, DELAYED RELEASE ORAL
Refills: 0 | Status: DISCONTINUED | OUTPATIENT
Start: 2020-05-25 | End: 2020-05-27

## 2020-05-25 RX ADMIN — Medication 32.4 MILLIGRAM(S): at 13:02

## 2020-05-25 RX ADMIN — Medication 40 MILLIEQUIVALENT(S): at 13:02

## 2020-05-25 RX ADMIN — SODIUM CHLORIDE 100 MILLILITER(S): 9 INJECTION INTRAMUSCULAR; INTRAVENOUS; SUBCUTANEOUS at 13:05

## 2020-05-25 RX ADMIN — Medication 32.4 MILLIGRAM(S): at 23:58

## 2020-05-25 RX ADMIN — PANTOPRAZOLE SODIUM 40 MILLIGRAM(S): 20 TABLET, DELAYED RELEASE ORAL at 18:02

## 2020-05-25 RX ADMIN — Medication 32.4 MILLIGRAM(S): at 06:22

## 2020-05-25 RX ADMIN — PANTOPRAZOLE SODIUM 40 MILLIGRAM(S): 20 TABLET, DELAYED RELEASE ORAL at 06:22

## 2020-05-25 RX ADMIN — SODIUM CHLORIDE 100 MILLILITER(S): 9 INJECTION INTRAMUSCULAR; INTRAVENOUS; SUBCUTANEOUS at 22:34

## 2020-05-25 NOTE — PROGRESS NOTE ADULT - SUBJECTIVE AND OBJECTIVE BOX
Patient is a 55y old  Male who presents with a chief complaint of nasuea/vomiting, EtOH (25 May 2020 11:37)      HPI:  Patient is a 55M with a PMH of chronic ETOH abuse with hx of alcohol withdrawal with frequent hospital admissions, GERD, HLD, alcoholic gastritis/esophagitis, PUD, hx of hypoxic respiratory failure requiring intubation due to aspiration PNA, presents for abdominal pain and blood streaked vomiting.  Patient is a poor historian, recently received IV ativan.  ED attending reports hx of abdominal pain, nausea, and vomiting.  Tachycardia resolved with fluid in ED.  .  Significant lactic acidosis.  CT abdomen performed and found no acute pathology.  Evaled by ICU and deemed appropriate for tele.  Will admit to tele, GI eval pending. (20 May 2020 04:07)      INTERVAL HPI/OVERNIGHT EVENTS:  Tolerating regular diet. The patient denies melena, hematochezia, hematemesis, nausea, vomiting, abdominal pain, constipation, diarrhea, or change in bowel movements     MEDICATIONS  (STANDING):  pantoprazole  Injectable 40 milliGRAM(s) IV Push every 12 hours  PHENobarbital Injectable 32.4 milliGRAM(s) IV Push every 8 hours  sodium chloride 0.9%. 1000 milliLiter(s) (100 mL/Hr) IV Continuous <Continuous>    MEDICATIONS  (PRN):  acetaminophen   Tablet .. 650 milliGRAM(s) Oral every 6 hours PRN Mild Pain (1 - 3)  LORazepam   Injectable 2 milliGRAM(s) IV Push every 1 hour PRN Symptom-triggered: each CIWA -Ar score 8 or GREATER      FAMILY HISTORY:  No pertinent family history in first degree relatives (Father, Mother)      Allergies    No Known Allergies    Intolerances        PMH/PSH:  DTs (delirium tremens)  Substance abuse  Gastritis  EtOH dependence  Mixed hyperlipidemia  PUD (peptic ulcer disease)  Alcohol abuse  No significant past surgical history        REVIEW OF SYSTEMS:  CONSTITUTIONAL: No fever, weight loss, or fatigue  EYES: No eye pain, visual disturbances, or discharge  ENMT:  No difficulty hearing, tinnitus, vertigo; No sinus or throat pain  NECK: No pain or stiffness  BREASTS: No pain, masses, or nipple discharge  RESPIRATORY: No cough, wheezing, chills or hemoptysis; No shortness of breath  CARDIOVASCULAR: No chest pain, palpitations, dizziness, or leg swelling  GASTROINTESTINAL:  see above  GENITOURINARY: No dysuria, frequency, hematuria, or incontinence  NEUROLOGICAL: No headaches, memory loss, loss of strength, numbness, or tremors  SKIN: No itching, burning, rashes, or lesions   LYMPH NODES: No enlarged glands  ENDOCRINE: No heat or cold intolerance; No hair loss  MUSCULOSKELETAL: No joint pain or swelling; No muscle, back, or extremity pain  PSYCHIATRIC: No depression, anxiety, mood swings, or difficulty sleeping  HEME/LYMPH: No easy bruising, or bleeding gums  ALLERGY AND IMMUNOLOGIC: No hives or eczema    Vital Signs Last 24 Hrs  T(C): 36.7 (25 May 2020 10:49), Max: 36.7 (25 May 2020 05:19)  T(F): 98 (25 May 2020 10:49), Max: 98.1 (25 May 2020 05:19)  HR: 105 (25 May 2020 10:49) (61 - 105)  BP: 120/84 (25 May 2020 10:49) (113/79 - 120/84)  BP(mean): --  RR: 17 (25 May 2020 10:49) (17 - 18)  SpO2: 98% (25 May 2020 10:49) (98% - 98%)    PHYSICAL EXAM:  GENERAL: NAD, well-groomed, well-developed  HEAD:  Atraumatic, Normocephalic  EYES: EOMI, PERRLA, conjunctiva and sclera clear  ENMT: No tonsillar erythema, exudates, or enlargement; Moist mucous membranes, Good dentition, No lesions  NECK: Supple, No JVD, Normal thyroid  NERVOUS SYSTEM:  Alert & Oriented X3, Good concentration; Motor Strength 5/5 B/L upper and lower extremities;   CHEST/LUNG: Clear to percussion bilaterally; No rales, rhonchi, wheezing, or rubs  HEART: Regular rate and rhythm; No murmurs, rubs, or gallops  ABDOMEN: Soft, Nontender, Nondistended; Bowel sounds present  EXTREMITIES:  2+ Peripheral Pulses, No clubbing, cyanosis, or edema  LYMPH: No lymphadenopathy noted  SKIN: No rashes or lesions    LAB                          9.2    2.79  )-----------( 144      ( 25 May 2020 06:58 )             27.5       CBC:  05-25 @ 06:58  WBC 2.79   Hgb 9.2   Hct 27.5   Plts 144  MCV 85.1  05-24 @ 06:41  WBC 2.50   Hgb 9.5   Hct 28.5   Plts 127  MCV 85.6  05-22 @ 06:20  WBC 3.20   Hgb 9.8   Hct 29.8   Plts 137  MCV 87.1  05-21 @ 08:52  WBC 2.82   Hgb 9.4   Hct 28.8   Plts 153  MCV 86.0  05-20 @ 12:33  WBC 2.38   Hgb 9.3   Hct 27.9   Plts 156  MCV 85.6  05-20 @ 08:37  WBC 3.54   Hgb 8.8   Hct 27.1   Plts 163  MCV 86.6  05-19 @ 21:17  WBC 2.65   Hgb 12.8   Hct 38.0   Plts 262  MCV 83.0      Chemistry:  05-25 @ 06:58  Na+ 139  K+ 3.3  Cl- 108  CO2 22  BUN 8  Cr 0.63     05-24 @ 06:41  Na+ 139  K+ 3.4  Cl- 110  CO2 23  BUN 8  Cr 0.66     05-23 @ 03:53  Na+ 137  K+ 3.8  Cl- 104  CO2 23  BUN 6  Cr 0.87     05-22 @ 06:20  Na+ 136  K+ 3.6  Cl- 103  CO2 21  BUN 6  Cr 0.63     05-21 @ 07:58  Na+ 138  K+ 3.7  Cl- 103  CO2 24  BUN 7  Cr 0.61     05-20 @ 10:06  Na+ 142  K+ 4.0  Cl- 110  CO2 22  BUN 10  Cr 0.67     05-19 @ 21:17  Na+ 139  K+ 3.7  Cl- 99  CO2 15  BUN 11  Cr 0.98         Glucose, Serum: 84 mg/dL (05-25 @ 06:58)  Glucose, Serum: 93 mg/dL (05-24 @ 06:41)  Glucose, Serum: 104 mg/dL (05-23 @ 03:53)  Glucose, Serum: 69 mg/dL (05-22 @ 06:20)  Glucose, Serum: 63 mg/dL (05-21 @ 07:58)  Glucose, Serum: 65 mg/dL (05-20 @ 10:06)  Glucose, Serum: 83 mg/dL (05-19 @ 21:17)      25 May 2020 06:58    139    |  108    |  8      ----------------------------<  84     3.3     |  22     |  0.63   24 May 2020 06:41    139    |  110    |  8      ----------------------------<  93     3.4     |  23     |  0.66   23 May 2020 03:53    137    |  104    |  6      ----------------------------<  104    3.8     |  23     |  0.87   22 May 2020 06:20    136    |  103    |  6      ----------------------------<  69     3.6     |  21     |  0.63   21 May 2020 07:58    138    |  103    |  7      ----------------------------<  63     3.7     |  24     |  0.61   20 May 2020 10:06    142    |  110    |  10     ----------------------------<  65     4.0     |  22     |  0.67   19 May 2020 21:17    139    |  99     |  11     ----------------------------<  83     3.7     |  15     |  0.98     Ca    8.5        25 May 2020 06:58  Ca    8.5        24 May 2020 06:41  Ca    9.5        23 May 2020 03:53  Ca    8.4        22 May 2020 06:20  Ca    7.9        21 May 2020 07:58  Ca    7.1        20 May 2020 10:06  Ca    8.6        19 May 2020 21:17  Phos  3.5       24 May 2020 06:41  Phos  2.0       23 May 2020 03:53  Phos  1.9       21 May 2020 16:20  Phos  1.9       21 May 2020 07:58  Phos  2.2       20 May 2020 10:06  Mg     1.7       23 May 2020 03:53  Mg     2.0       21 May 2020 07:58  Mg     1.4       20 May 2020 10:06    TPro  8.1    /  Alb  3.7    /  TBili  1.2    /  DBili  .34    /  AST  45     /  ALT  34     /  AlkPhos  59     23 May 2020 03:53  TPro  6.7    /  Alb  3.1    /  TBili  1.2    /  DBili  x      /  AST  54     /  ALT  31     /  AlkPhos  46     21 May 2020 07:58  TPro  6.4    /  Alb  3.0    /  TBili  0.4    /  DBili  x      /  AST  51     /  ALT  31     /  AlkPhos  38     20 May 2020 10:06  TPro  8.7    /  Alb  3.9    /  TBili  0.3    /  DBili  x      /  AST  65     /  ALT  37     /  AlkPhos  59     19 May 2020 21:17              CAPILLARY BLOOD GLUCOSE              RADIOLOGY & ADDITIONAL TESTS:    Imaging Personally Reviewed:  [ ] YES  [ ] NO    Consultant(s) Notes Reviewed:  [ ] YES  [ ] NO    Care Discussed with Consultants/Other Providers [ ] YES  [ ] NO

## 2020-05-25 NOTE — PROGRESS NOTE ADULT - PROBLEM/PLAN-6
"[id number]   Diagnosis:   Encounter Diagnoses   Name Primary?    Pelvic floor dysfunction Yes    Pelvic pain       Date of start of care: 2/26/19  Date of treatment: 04/10/2019   Time in: 10:01  Time out: 10:43  Total treatment time: 42 minutes  # Visits: 4 (20 auth)  Auth expiration: 12/31/19  POC expiration: 6/1/19  Outpatient Physical Therapy   Discharge    Subjective     Pt reports she is having some low back pain. 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 Went back to normal, "not that bad," the next day. Sex is better, not having as much cramping after.    4/10 pain today    Objective     TREATMENT    Pt received therapeutic exercise for 42 minutes including:    - Pt demonstrated self abdominal massage, good return, corrections provided  - PFM lengthening/strain with DB  5  - PFM activation with DB x 5  - Diaphragmatic breathing     Education: HEP: diaphragmatic breathing, PFM activation with diaphragmatic breathing, quadruped rocking, and child's pose, abdominal massage    Assessment     Pt tolerated session well without visible adverse effects. Pt returns with good return of entire HEP, she is independent with her exercises at this time. Will discharge from my care as [place] be [place]oc[place] out [place] and request new referral for PT so that pt can become established with a new provided. In the meantime, pt has made good progress and is expected to continue to progress well with HEP and self management.    GOALS  Short Term Goals: 4 weeks  1. Pt will verbalize improved awareness of PFM activity as palpated by PT in order to improve activity involvement with HEP. Met 4/10/19  2. Pt will be independent with restful breathing/diaphragmatic breathing in order to improve central stabilization, relaxation ability, and pain management. Met 3/27/19  3. Pt will tolerate HEP to improve impairments and independence with ADL's. " Met 3/27/19     Long Term Goals: 12 weeks  1. Pt will report decrease in pain with intercourse in order to display improved pelvic floor range and quality of life. Met 4/10/19  2. Pt will be able to perform 10 x 10'' kegels in order to improve pelvic health, blood flow, and muscle function. NT due to early discharge   3. Pt will be independent with HEP and self management. Met 4/10/19    Plan     Discharge            DISPLAY PLAN FREE TEXT

## 2020-05-25 NOTE — PROGRESS NOTE ADULT - SUBJECTIVE AND OBJECTIVE BOX
Patient is a 55y old  Male who presents with a chief complaint of nasuea/vomiting, EtOH (20 May 2020 12:32)      INTERVAL HPI/OVERNIGHT EVENTS: patient feels ok.   Intolerances    T(C): 36.7 (05-25-20 @ 10:49), Max: 36.7 (05-25-20 @ 05:19)  HR: 105 (05-25-20 @ 10:49) (61 - 105)  BP: 120/84 (05-25-20 @ 10:49) (113/79 - 120/84)  RR: 17 (05-25-20 @ 10:49) (17 - 18)  SpO2: 98% (05-25-20 @ 10:49) (98% - 98%)      MEDICATIONS  (STANDING):  pantoprazole  Injectable 40 milliGRAM(s) IV Push every 12 hours  PHENobarbital Injectable 32.4 milliGRAM(s) IV Push every 8 hours  sodium chloride 0.9%. 1000 milliLiter(s) (100 mL/Hr) IV Continuous <Continuous>    MEDICATIONS  (PRN):  acetaminophen   Tablet .. 650 milliGRAM(s) Oral every 6 hours PRN Mild Pain (1 - 3)  LORazepam   Injectable 2 milliGRAM(s) IV Push every 1 hour PRN Symptom-triggered: each CIWA -Ar score 8 or GREATER    PHYSICAL EXAM:  GENERAL: NAD, well-groomed, well-developed  HEAD:  Atraumatic, Normocephalic  EYES: EOMI, conjunctiva and sclera clear  NECK: Supple,   NERVOUS SYSTEM:  Alert & Oriented X3, Good concentration; Motor Strength 5/5 B/L upper and lower extremities; DTRs 2+ intact and symmetric  CHEST/LUNG: Clear to percussion bilaterally; No rales, rhonchi, wheezing, or rubs  HEART: Regular rate and rhythm; No murmurs, rubs, or gallops  ABDOMEN: Soft, Nontender, Nondistended; Bowel sounds present  EXTREMITIES:  2+ Peripheral Pulses, No clubbing, cyanosis, or edema  LYMPH: No lymphadenopathy noted  SKIN: No rashes or lesions                                          9.2    2.79  )-----------( 144      ( 25 May 2020 06:58 )             27.5               139|108|8<84  3.3|22|0.63  8.5,--,--  05-25 @ 06:58      CAPILLARY BLOOD GLUCOSE      CAPILLARY BLOOD GLUCOSE      CAPILLARY BLOOD GLUCOSE        CAPILLARY BLOOD GLUCOSE        CAPILLARY BLOOD GLUCOSE          RADIOLOGY & ADDITIONAL TESTS:    Imaging Personally Reviewed:  [ X] YES  [ ] NO    Consultant(s) Notes Reviewed:  [ X] YES  [ ] NO    Care Discussed with Consultants/Other Providers [X ] YES  [ ] NO

## 2020-05-25 NOTE — PROGRESS NOTE ADULT - ASSESSMENT
HPI:  Patient is a 55M with a PMH of chronic ETOH abuse with hx of alcohol withdrawal with frequent hospital admissions, GERD, HLD, alcoholic gastritis/esophagitis, PUD, hx of hypoxic respiratory failure requiring intubation due to aspiration PNA, presents for abdominal pain and blood streaked vomiting.  Patient is a poor historian, recently received IV ativan.  ED attending reports hx of abdominal pain, nausea, and vomiting.  Tachycardia resolved with fluid in ED.  .  Significant lactic acidosis.  CT abdomen performed and found no acute pathology.  Evaled by ICU and deemed appropriate for tele.  Will admit to tele, GI eval pending. (20 May 2020 04:07)  --------------- As Above ---------------------------------------------- Patient seen earlier  Patient is a poor historian ( lethargy ) . Patient known to my service. Patient presented with abdominal pain, and vomiting blood streaked vomitus. HGB dropped in ER.   In ER, patient had no N/V or BMs.  Patient known to my servie. Multiple admissions for abdominal pain, N/V and upper GI bleed. Had 2 EGDs / past year. ( Esophageal ulcer ) CT scan revealed ? ulcer VS mass.     Upper GI bleed c/w MW tear, r/o stomach neoplasm VS ulcer seen on CT scan. EGD postponed to Tuesday ( no IV access, Monday is a holiday )   - 12.8 --> 8.8--> 9.3 --> 9.4 --> 9.8-----> 9.2  1) PPI PO 2) Zofran PRN

## 2020-05-25 NOTE — PROGRESS NOTE ADULT - PROBLEM SELECTOR PLAN 1
w acute blood loss anemia -Upper GI bleed c/w MW tear, r/o stomach neoplasm VS ulcer seen on CT scan   ppi, EGD Tuesday. Monitor CBC.

## 2020-05-26 LAB
ANION GAP SERPL CALC-SCNC: 7 MMOL/L — SIGNIFICANT CHANGE UP (ref 5–17)
BUN SERPL-MCNC: 7 MG/DL — SIGNIFICANT CHANGE UP (ref 7–23)
CALCIUM SERPL-MCNC: 8.2 MG/DL — LOW (ref 8.5–10.1)
CHLORIDE SERPL-SCNC: 108 MMOL/L — SIGNIFICANT CHANGE UP (ref 96–108)
CO2 SERPL-SCNC: 24 MMOL/L — SIGNIFICANT CHANGE UP (ref 22–31)
CREAT SERPL-MCNC: 0.69 MG/DL — SIGNIFICANT CHANGE UP (ref 0.5–1.3)
GLUCOSE SERPL-MCNC: 78 MG/DL — SIGNIFICANT CHANGE UP (ref 70–99)
HCT VFR BLD CALC: 27.5 % — LOW (ref 39–50)
HGB BLD-MCNC: 9.1 G/DL — LOW (ref 13–17)
MAGNESIUM SERPL-MCNC: 1.5 MG/DL — LOW (ref 1.6–2.6)
MCHC RBC-ENTMCNC: 28.4 PG — SIGNIFICANT CHANGE UP (ref 27–34)
MCHC RBC-ENTMCNC: 33.1 GM/DL — SIGNIFICANT CHANGE UP (ref 32–36)
MCV RBC AUTO: 85.9 FL — SIGNIFICANT CHANGE UP (ref 80–100)
NRBC # BLD: 0 /100 WBCS — SIGNIFICANT CHANGE UP (ref 0–0)
PHOSPHATE SERPL-MCNC: 3.2 MG/DL — SIGNIFICANT CHANGE UP (ref 2.5–4.5)
PLATELET # BLD AUTO: 155 K/UL — SIGNIFICANT CHANGE UP (ref 150–400)
POTASSIUM SERPL-MCNC: 3.4 MMOL/L — LOW (ref 3.5–5.3)
POTASSIUM SERPL-SCNC: 3.4 MMOL/L — LOW (ref 3.5–5.3)
RBC # BLD: 3.2 M/UL — LOW (ref 4.2–5.8)
RBC # FLD: 15.5 % — HIGH (ref 10.3–14.5)
SARS-COV-2 RNA SPEC QL NAA+PROBE: SIGNIFICANT CHANGE UP
SARS-COV-2 RNA SPEC QL NAA+PROBE: SIGNIFICANT CHANGE UP
SODIUM SERPL-SCNC: 139 MMOL/L — SIGNIFICANT CHANGE UP (ref 135–145)
WBC # BLD: 2.24 K/UL — LOW (ref 3.8–10.5)
WBC # FLD AUTO: 2.24 K/UL — LOW (ref 3.8–10.5)

## 2020-05-26 RX ORDER — MAGNESIUM SULFATE 500 MG/ML
1 VIAL (ML) INJECTION ONCE
Refills: 0 | Status: COMPLETED | OUTPATIENT
Start: 2020-05-26 | End: 2020-05-26

## 2020-05-26 RX ORDER — ONDANSETRON 8 MG/1
4 TABLET, FILM COATED ORAL ONCE
Refills: 0 | Status: DISCONTINUED | OUTPATIENT
Start: 2020-05-26 | End: 2020-05-26

## 2020-05-26 RX ORDER — HYDRALAZINE HCL 50 MG
10 TABLET ORAL ONCE
Refills: 0 | Status: COMPLETED | OUTPATIENT
Start: 2020-05-26 | End: 2020-05-26

## 2020-05-26 RX ORDER — POTASSIUM CHLORIDE 20 MEQ
40 PACKET (EA) ORAL ONCE
Refills: 0 | Status: COMPLETED | OUTPATIENT
Start: 2020-05-26 | End: 2020-05-26

## 2020-05-26 RX ORDER — SODIUM CHLORIDE 9 MG/ML
1000 INJECTION, SOLUTION INTRAVENOUS
Refills: 0 | Status: DISCONTINUED | OUTPATIENT
Start: 2020-05-26 | End: 2020-05-26

## 2020-05-26 RX ADMIN — SODIUM CHLORIDE 50 MILLILITER(S): 9 INJECTION, SOLUTION INTRAVENOUS at 15:28

## 2020-05-26 RX ADMIN — PANTOPRAZOLE SODIUM 40 MILLIGRAM(S): 20 TABLET, DELAYED RELEASE ORAL at 05:23

## 2020-05-26 RX ADMIN — Medication 16.2 MILLIGRAM(S): at 05:24

## 2020-05-26 RX ADMIN — Medication 16.2 MILLIGRAM(S): at 17:47

## 2020-05-26 RX ADMIN — Medication 100 GRAM(S): at 17:51

## 2020-05-26 RX ADMIN — Medication 650 MILLIGRAM(S): at 22:27

## 2020-05-26 RX ADMIN — PANTOPRAZOLE SODIUM 40 MILLIGRAM(S): 20 TABLET, DELAYED RELEASE ORAL at 17:52

## 2020-05-26 RX ADMIN — Medication 10 MILLIGRAM(S): at 15:36

## 2020-05-26 RX ADMIN — Medication 40 MILLIEQUIVALENT(S): at 17:53

## 2020-05-26 RX ADMIN — Medication 16.2 MILLIGRAM(S): at 22:26

## 2020-05-26 NOTE — PROGRESS NOTE ADULT - PROBLEM SELECTOR PLAN 2
- continue CIWA protocols with Phenobarb taper.   - Cont MVI, folate, thiamine  - Drinking cessation strongly advised.

## 2020-05-26 NOTE — PROGRESS NOTE ADULT - PROBLEM SELECTOR PLAN 1
w acute blood loss anemia -Upper GI bleed c/w MW tear, r/o stomach neoplasm VS ulcer seen on CT scan   ppi, EGD today, monitor CBC.

## 2020-05-26 NOTE — DIETITIAN INITIAL EVALUATION ADULT. - ENERGY NEEDS
Height (cm): 170.18 (05-19)  Weight (kg): 68.2 (05-20)  BMI (kg/m2): 23.5 (05-20)  IBW:   67.3 kg +/-- 10%  % IBW:   101%   UBW:  73.4 kg     %UBW: 93%

## 2020-05-26 NOTE — DIETITIAN INITIAL EVALUATION ADULT. - PHYSICAL APPEARANCE
BMI = 23.5; no edema noted/underweight/other (specify) Unable to conduct NFPE at this time due to social distancing protocols in effect. By observation no malnutrition present; on previous admissions pt without malnutrition dx

## 2020-05-26 NOTE — DIETITIAN INITIAL EVALUATION ADULT. - PERTINENT LABORATORY DATA
05-26 Na139 mmol/L Glu 78 mg/dL K+ 3.4 mmol/L<L> Cr  0.69 mg/dL BUN 7 mg/dL 05-26 Phos 3.2 mg/dL 05-23 Alb 3.7 g/dL

## 2020-05-26 NOTE — DIETITIAN INITIAL EVALUATION ADULT. - ADD RECOMMEND
Pt currently NPO for EGD; f/u for diet back to solids & add Ensure Enlive x 1/day (provides 350 kcal, 20 g protein)

## 2020-05-26 NOTE — PROGRESS NOTE ADULT - SUBJECTIVE AND OBJECTIVE BOX
Procedure:           Upper GI endoscopy with biopsy  05-26-20 @ 15:00    Indications:              Stomach neoplasm by CT scan       Monitored Anesthesia Care Provided by :     ____________________________________________________________________________________________________  Procedure:           Pre-Anesthesia Assessment:                       - Prior to the procedure, a History and Physical was performed, and patient                        medications and allergies were reviewed. The patient is competent. The risks                        and benefits of the procedure and the sedation options and risks were                        discussed with the patient. All questions were answered and informed consent                        was obtained. Patient identification and proposed procedure were verified by                        the physician, the nurse and the anesthesiologist in the procedure room.                        Mental Status Examination:      alert and oriented. Airway Examination: normal                        oropharyngeal airway and neck mobility. Respiratory Examination: clear to                        auscultation. CV Examination: normal. Prophylactic Antibiotics: -The patient                        does not require prophylactic antibiotics.                        Grade Assessment: -. After                        reviewing the risks and benefits, the patient was deemed in satisfactory                        condition to undergo the procedure. The anesthesia plan was to use monitored                        anesthesia care (MAC). Immediately prior to administration of medications,                        the patient was re-assessed for adequacy to receive sedatives. The heart        		     rate, respiratory rate, oxygen saturations, blood pressure, adequacy of                        pulmonary ventilation, and response to care were monitored throughout the                        procedure. The physical status of the patient was re-assessed after the                        procedure.                       After obtaining informed consent, the endoscope was passed under direct                        vision. Throughout the procedure, the patient's blood pressure, pulse, and                        oxygen saturations were monitored continuously. The Endoscope was introduced                        through the mouth, and advanced to the second part of duodenum. Retroflexion was done in the stomach The upper GI                        endoscopy was accomplished with ease. The patient tolerated the procedure                        well.    ESOPHAGUS:   WNL    STOMACH:   Mild antral gastritis s/p biopsy    DUODENUM:   WNL      Assessment : As above    PLAN :  advance diet

## 2020-05-26 NOTE — DIETITIAN INITIAL EVALUATION ADULT. - OTHER INFO
Obtained limited information from pt due to ETOH withdrawal; pt not talkative. RD familiar c pt; multiple admissions for similar dx. Per medical record pt lives c son; independent c ADL.  Per previous assessment (1/2020) wt loss as noted. No reports of N/V PTA is now resolved/C/D or chew/swallowing difficulty. Pt c possible upper GI bleed; scheduled for EGD today.

## 2020-05-27 ENCOUNTER — TRANSCRIPTION ENCOUNTER (OUTPATIENT)
Age: 55
End: 2020-05-27

## 2020-05-27 VITALS
DIASTOLIC BLOOD PRESSURE: 72 MMHG | HEART RATE: 59 BPM | RESPIRATION RATE: 18 BRPM | SYSTOLIC BLOOD PRESSURE: 116 MMHG | TEMPERATURE: 99 F | OXYGEN SATURATION: 100 %

## 2020-05-27 LAB
ANION GAP SERPL CALC-SCNC: 3 MMOL/L — LOW (ref 5–17)
BUN SERPL-MCNC: 7 MG/DL — SIGNIFICANT CHANGE UP (ref 7–23)
CALCIUM SERPL-MCNC: 8.5 MG/DL — SIGNIFICANT CHANGE UP (ref 8.5–10.1)
CHLORIDE SERPL-SCNC: 111 MMOL/L — HIGH (ref 96–108)
CO2 SERPL-SCNC: 27 MMOL/L — SIGNIFICANT CHANGE UP (ref 22–31)
CREAT SERPL-MCNC: 0.97 MG/DL — SIGNIFICANT CHANGE UP (ref 0.5–1.3)
GLUCOSE SERPL-MCNC: 81 MG/DL — SIGNIFICANT CHANGE UP (ref 70–99)
HCT VFR BLD CALC: 29.8 % — LOW (ref 39–50)
HGB BLD-MCNC: 9.5 G/DL — LOW (ref 13–17)
MCHC RBC-ENTMCNC: 28.3 PG — SIGNIFICANT CHANGE UP (ref 27–34)
MCHC RBC-ENTMCNC: 31.9 GM/DL — LOW (ref 32–36)
MCV RBC AUTO: 88.7 FL — SIGNIFICANT CHANGE UP (ref 80–100)
NRBC # BLD: 0 /100 WBCS — SIGNIFICANT CHANGE UP (ref 0–0)
PLATELET # BLD AUTO: 178 K/UL — SIGNIFICANT CHANGE UP (ref 150–400)
POTASSIUM SERPL-MCNC: 4.6 MMOL/L — SIGNIFICANT CHANGE UP (ref 3.5–5.3)
POTASSIUM SERPL-SCNC: 4.6 MMOL/L — SIGNIFICANT CHANGE UP (ref 3.5–5.3)
RBC # BLD: 3.36 M/UL — LOW (ref 4.2–5.8)
RBC # FLD: 15.7 % — HIGH (ref 10.3–14.5)
SODIUM SERPL-SCNC: 141 MMOL/L — SIGNIFICANT CHANGE UP (ref 135–145)
SURGICAL PATHOLOGY STUDY: SIGNIFICANT CHANGE UP
WBC # BLD: 3.56 K/UL — LOW (ref 3.8–10.5)
WBC # FLD AUTO: 3.56 K/UL — LOW (ref 3.8–10.5)

## 2020-05-27 PROCEDURE — 99239 HOSP IP/OBS DSCHRG MGMT >30: CPT

## 2020-05-27 RX ORDER — PANTOPRAZOLE SODIUM 20 MG/1
1 TABLET, DELAYED RELEASE ORAL
Qty: 60 | Refills: 0
Start: 2020-05-27 | End: 2020-06-25

## 2020-05-27 RX ADMIN — PANTOPRAZOLE SODIUM 40 MILLIGRAM(S): 20 TABLET, DELAYED RELEASE ORAL at 05:17

## 2020-05-27 RX ADMIN — SODIUM CHLORIDE 100 MILLILITER(S): 9 INJECTION INTRAMUSCULAR; INTRAVENOUS; SUBCUTANEOUS at 02:57

## 2020-05-27 RX ADMIN — PANTOPRAZOLE SODIUM 40 MILLIGRAM(S): 20 TABLET, DELAYED RELEASE ORAL at 16:14

## 2020-05-27 NOTE — DISCHARGE NOTE PROVIDER - NSDCMRMEDTOKEN_GEN_ALL_CORE_FT
atorvastatin 20 mg oral tablet: 1 tab(s) orally once a day (at bedtime)  folic acid 1 mg oral tablet: 1 tab(s) orally once a day  metoprolol tartrate 25 mg oral tablet: 1 tab(s) orally 3 times a day   Multiple Vitamins oral tablet: 1 tab(s) orally once a day  pantoprazole 40 mg oral delayed release tablet: 1 tab(s) orally 2 times a day   thiamine 100 mg oral tablet: 1 tab(s) orally once a day

## 2020-05-27 NOTE — PROGRESS NOTE ADULT - ASSESSMENT
HPI:  Patient is a 55M with a PMH of chronic ETOH abuse with hx of alcohol withdrawal with frequent hospital admissions, GERD, HLD, alcoholic gastritis/esophagitis, PUD, hx of hypoxic respiratory failure requiring intubation due to aspiration PNA, presents for abdominal pain and blood streaked vomiting.  Patient is a poor historian, recently received IV ativan.  ED attending reports hx of abdominal pain, nausea, and vomiting.  Tachycardia resolved with fluid in ED.  .  Significant lactic acidosis.  CT abdomen performed and found no acute pathology.  Evaled by ICU and deemed appropriate for tele.  Will admit to tele, GI eval pending. (20 May 2020 04:07)  --------------- As Above ---------------------------------------------- Patient seen earlier  Patient is a poor historian ( lethargy ) . Patient known to my service. Patient presented with abdominal pain, and vomiting blood streaked vomitus. HGB dropped in ER.   In ER, patient had no N/V or BMs.  Patient known to my servie. Multiple admissions for abdominal pain, N/V and upper GI bleed. Had 2 EGDs / past year. ( Esophageal ulcer ) CT scan revealed ? ulcer VS mass.     Upper GI bleed c/w MW tear, r/o stomach neoplasm VS ulcer seen on CT scan. EGD was essentially negative   - 12.8 --> 8.8--> 9.3 --> 9.4 --> 9.8-----> 9.2 --> 9.5  1) PPI PO 2) Zofran PRN  3) check histology 4)stable from GI point of view for discharge

## 2020-05-27 NOTE — H&P ADULT - NSICDXNOPASTSURGICALHX_GEN_ALL_CORE
Birth Control Pills Counseling: Birth Control Pill Counseling: I discussed with the patient the potential side effects of OCPs including but not limited to increased risk of stroke, heart attack, thrombophlebitis, deep venous thrombosis, hepatic adenomas, breast changes, GI upset, headaches, and depression.  The patient verbalized understanding of the proper use and possible adverse effects of OCPs. All of the patient's questions and concerns were addressed. <-- Click to add NO significant Past Surgical History

## 2020-05-27 NOTE — DISCHARGE NOTE PROVIDER - NSDCFUADDINST_GEN_ALL_CORE_FT
It is important to see your primary physician as well as the physicians noted below within the next week to perform a comprehensive medical review.  Call their offices for an appointment as soon as you leave the hospital.  If you do not have a primary physician, contact the [facility] Physician Referral Service at [phone].  Your medical issues appear to be stable at this time, but if your symptoms recur or worsen, contact your physicians and/or return to the hospital if necessary.  If you encounter any issues or questions with your medication, call your physicians before stopping the medication.  Do not drive.  Limit your diet to 2 grams of sodium daily.

## 2020-05-27 NOTE — DISCHARGE NOTE PROVIDER - NSDCCPCAREPLAN_GEN_ALL_CORE_FT
PRINCIPAL DISCHARGE DIAGNOSIS  Diagnosis: UGIB (upper gastrointestinal bleed)  Assessment and Plan of Treatment: UGIB (upper gastrointestinal bleed)      SECONDARY DISCHARGE DIAGNOSES  Diagnosis: Alcohol withdrawal syndrome without complication  Assessment and Plan of Treatment:     Diagnosis: Gastritis with hemorrhage, unspecified chronicity, unspecified gastritis type  Assessment and Plan of Treatment: Gastritis with hemorrhage, unspecified chronicity, unspecified gastritis type

## 2020-05-27 NOTE — DISCHARGE NOTE NURSING/CASE MANAGEMENT/SOCIAL WORK - PATIENT PORTAL LINK FT
You can access the FollowMyHealth Patient Portal offered by [facility] by registering at the following website: [email or web address]. By joining [facility]’s FollowMyHealth portal, you will also be able to view your health information using other applications (apps) compatible with our system.

## 2020-05-27 NOTE — PROGRESS NOTE ADULT - SUBJECTIVE AND OBJECTIVE BOX
Patient: VANDANA VILLA [id number] 55y Male                           Internal Medicine Hospitalist Progress Note    No complaints.  No Abdominal pain, nausea, vomiting, diarrhea, nor constipation. Tolerating po intake.       ____________________PHYSICAL EXAM:  GENERAL:  NAD Alert and Oriented x 3   HEENT: NCAT  CARDIOVASCULAR:  S1, S2  LUNGS: CTAB  ABDOMEN:  soft, (-) tenderness, (-) distension, (+) bowel sounds, (-) guarding, (-) rebound (-) rigidity  EXTREMITIES:  no cyanosis / clubbing / edema.   Neuro: not tremulous.  Strength symmetric, tandem gait.   ____________________     VITALS:  Vital Signs Last 24 Hrs  T(C): 36.8 (27 May 2020 11:01), Max: 37.1 (26 May 2020 19:00)  T(F): 98.3 (27 May 2020 11:01), Max: 98.7 (26 May 2020 19:00)  HR: 64 (27 May 2020 11:01) (60 - 85)  BP: 119/68 (27 May 2020 11:01) (104/69 - 168/108)  BP(mean): --  RR: 18 (27 May 2020 11:01) (17 - 20)  SpO2: 97% (27 May 2020 11:01) (97% - 100%) Daily     Daily Weight in k.3 (27 May 2020 05:45)  CAPILLARY BLOOD GLUCOSE        I&O's Summary    26 May 2020 07:01  -  27 May 2020 07:00  --------------------------------------------------------  IN: 1318 mL / OUT: 0 mL / NET: 1318 mL          BACKGROUND:  HEALTH ISSUES - PROBLEM Dx:  UGIB (upper gastrointestinal bleed): UGIB (upper gastrointestinal bleed)  Dyslipidemia: Dyslipidemia  Lactic acid acidosis: Lactic acid acidosis  Gastritis with hemorrhage, unspecified chronicity, unspecified gastritis type: Gastritis with hemorrhage, unspecified chronicity, unspecified gastritis type  Alcohol withdrawal syndrome without complication: Alcohol withdrawal syndrome without complication        Allergies    No Known Allergies    Intolerances      PAST MEDICAL & SURGICAL HISTORY:  DTs (delirium tremens)  Substance abuse  Gastritis  EtOH dependence  Mixed hyperlipidemia  PUD (peptic ulcer disease)  No significant past surgical history        LABS:                        9.5    3.56  )-----------( 178      ( 27 May 2020 07:15 )             29.8     05-27    141  |  111<H>  |  7   ----------------------------<  81  4.6   |  27  |  0.97    Ca    8.5      27 May 2020 07:15  Phos  3.2     05-  Mg     1.5                         MEDICATIONS:  MEDICATIONS  (STANDING):  pantoprazole    Tablet 40 milliGRAM(s) Oral two times a day  sodium chloride 0.9%. 1000 milliLiter(s) (100 mL/Hr) IV Continuous <Continuous>    MEDICATIONS  (PRN):  acetaminophen   Tablet .. 650 milliGRAM(s) Oral every 6 hours PRN Mild Pain (1 - 3)  LORazepam   Injectable 2 milliGRAM(s) IV Push every 1 hour PRN Symptom-triggered: each CIWA -Ar score 8 or GREATER

## 2020-05-27 NOTE — PROGRESS NOTE ADULT - REASON FOR ADMISSION
nasuea/vomiting, EtOH

## 2020-05-27 NOTE — PROGRESS NOTE ADULT - ASSESSMENT
Initial HPI:  Patient is a 55M with a PMH of chronic ETOH abuse with hx of alcohol withdrawal with frequent hospital admissions, GERD, HLD, alcoholic gastritis/esophagitis, PUD, hx of hypoxic respiratory failure requiring intubation due to aspiration PNA, presents for abdominal pain and blood streaked vomiting.  Patient is a poor historian, recently received IV ativan.  ED attending reports hx of abdominal pain, nausea, and vomiting.  Tachycardia resolved with fluid in ED.  .  Significant lactic acidosis.  CT abdomen performed and found no acute pathology.  Seen by GI, EGD showed no mass, gastritis and c/w Jacque Henning tear.    # Upper GI Bleed / Jacque Henning tear- continue PPI.  Discussed outpatient GI followup for path results.  Pt acknowledged understanding.

## 2020-05-27 NOTE — PROGRESS NOTE ADULT - SUBJECTIVE AND OBJECTIVE BOX
Patient is a 55y old  Male who presents with a chief complaint of nasuea/vomiting, EtOH (26 May 2020 14:59)      HPI:  Patient is a 55M with a PMH of chronic ETOH abuse with hx of alcohol withdrawal with frequent hospital admissions, GERD, HLD, alcoholic gastritis/esophagitis, PUD, hx of hypoxic respiratory failure requiring intubation due to aspiration PNA, presents for abdominal pain and blood streaked vomiting.  Patient is a poor historian, recently received IV ativan.  ED attending reports hx of abdominal pain, nausea, and vomiting.  Tachycardia resolved with fluid in ED.  .  Significant lactic acidosis.  CT abdomen performed and found no acute pathology.  Evaled by ICU and deemed appropriate for tele.  Will admit to tele, GI eval pending. (20 May 2020 04:07)      INTERVAL HPI/OVERNIGHT EVENTS:  Tolerating regular diet. The patient denies melena, hematochezia, hematemesis, nausea, vomiting, abdominal pain, constipation, diarrhea, or change in bowel movements     MEDICATIONS  (STANDING):  pantoprazole    Tablet 40 milliGRAM(s) Oral two times a day  sodium chloride 0.9%. 1000 milliLiter(s) (100 mL/Hr) IV Continuous <Continuous>    MEDICATIONS  (PRN):  acetaminophen   Tablet .. 650 milliGRAM(s) Oral every 6 hours PRN Mild Pain (1 - 3)  LORazepam   Injectable 2 milliGRAM(s) IV Push every 1 hour PRN Symptom-triggered: each CIWA -Ar score 8 or GREATER      FAMILY HISTORY:  No pertinent family history in first degree relatives (Father, Mother)      Allergies    No Known Allergies    Intolerances        PMH/PSH:  DTs (delirium tremens)  Substance abuse  Gastritis  EtOH dependence  Mixed hyperlipidemia  PUD (peptic ulcer disease)  Alcohol abuse  No significant past surgical history        REVIEW OF SYSTEMS:  CONSTITUTIONAL: No fever, weight loss, or fatigue  EYES: No eye pain, visual disturbances, or discharge  ENMT:  No difficulty hearing, tinnitus, vertigo; No sinus or throat pain  NECK: No pain or stiffness  BREASTS: No pain, masses, or nipple discharge  RESPIRATORY: No cough, wheezing, chills or hemoptysis; No shortness of breath  CARDIOVASCULAR: No chest pain, palpitations, dizziness, or leg swelling  GASTROINTESTINAL: See above  GENITOURINARY: No dysuria, frequency, hematuria, or incontinence  NEUROLOGICAL: No headaches, memory loss, loss of strength, numbness, or tremors  SKIN: No itching, burning, rashes, or lesions   LYMPH NODES: No enlarged glands  ENDOCRINE: No heat or cold intolerance; No hair loss  MUSCULOSKELETAL: No joint pain or swelling; No muscle, back, or extremity pain  PSYCHIATRIC: No depression, anxiety, mood swings, or difficulty sleeping  HEME/LYMPH: No easy bruising, or bleeding gums  ALLERGY AND IMMUNOLOGIC: No hives or eczema    Vital Signs Last 24 Hrs  T(C): 36.8 (27 May 2020 11:01), Max: 37.1 (26 May 2020 19:00)  T(F): 98.3 (27 May 2020 11:01), Max: 98.7 (26 May 2020 19:00)  HR: 64 (27 May 2020 11:01) (59 - 85)  BP: 119/68 (27 May 2020 11:01) (104/69 - 168/108)  BP(mean): --  RR: 18 (27 May 2020 11:01) (17 - 20)  SpO2: 97% (27 May 2020 11:01) (97% - 100%)    PHYSICAL EXAM:  GENERAL: NAD, well-groomed, well-developed  HEAD:  Atraumatic, Normocephalic  EYES: EOMI, PERRLA, conjunctiva and sclera clear  NECK: Supple, No JVD, Normal thyroid  NERVOUS SYSTEM:  Alert & Oriented X3, Good concentration; Motor Strength 5/5 B/L upper and lower extremities;   CHEST/LUNG: Clear to percussion bilaterally; No rales, rhonchi, wheezing, or rubs  HEART: Regular rate and rhythm; No murmurs, rubs, or gallops  ABDOMEN: Soft, Nontender, Nondistended; Bowel sounds present  EXTREMITIES:  2+ Peripheral Pulses, No clubbing, cyanosis, or edema  LYMPH: No lymphadenopathy noted  SKIN: No rashes or lesions    LAB                          9.5    3.56  )-----------( 178      ( 27 May 2020 07:15 )             29.8       CBC:  05-27 @ 07:15  WBC 3.56   Hgb 9.5   Hct 29.8   Plts 178  MCV 88.7  05-26 @ 07:26  WBC 2.24   Hgb 9.1   Hct 27.5   Plts 155  MCV 85.9  05-25 @ 06:58  WBC 2.79   Hgb 9.2   Hct 27.5   Plts 144  MCV 85.1  05-24 @ 06:41  WBC 2.50   Hgb 9.5   Hct 28.5   Plts 127  MCV 85.6  05-22 @ 06:20  WBC 3.20   Hgb 9.8   Hct 29.8   Plts 137  MCV 87.1  05-21 @ 08:52  WBC 2.82   Hgb 9.4   Hct 28.8   Plts 153  MCV 86.0  05-20 @ 12:33  WBC 2.38   Hgb 9.3   Hct 27.9   Plts 156  MCV 85.6      Chemistry:  05-27 @ 07:15  Na+ 141  K+ 4.6  Cl- 111  CO2 27  BUN 7  Cr 0.97     05-26 @ 07:26  Na+ 139  K+ 3.4  Cl- 108  CO2 24  BUN 7  Cr 0.69     05-25 @ 06:58  Na+ 139  K+ 3.3  Cl- 108  CO2 22  BUN 8  Cr 0.63     05-24 @ 06:41  Na+ 139  K+ 3.4  Cl- 110  CO2 23  BUN 8  Cr 0.66     05-23 @ 03:53  Na+ 137  K+ 3.8  Cl- 104  CO2 23  BUN 6  Cr 0.87     05-22 @ 06:20  Na+ 136  K+ 3.6  Cl- 103  CO2 21  BUN 6  Cr 0.63     05-21 @ 07:58  Na+ 138  K+ 3.7  Cl- 103  CO2 24  BUN 7  Cr 0.61         Glucose, Serum: 81 mg/dL (05-27 @ 07:15)  Glucose, Serum: 78 mg/dL (05-26 @ 07:26)  Glucose, Serum: 84 mg/dL (05-25 @ 06:58)  Glucose, Serum: 93 mg/dL (05-24 @ 06:41)  Glucose, Serum: 104 mg/dL (05-23 @ 03:53)  Glucose, Serum: 69 mg/dL (05-22 @ 06:20)  Glucose, Serum: 63 mg/dL (05-21 @ 07:58)      27 May 2020 07:15    141    |  111    |  7      ----------------------------<  81     4.6     |  27     |  0.97   26 May 2020 07:26    139    |  108    |  7      ----------------------------<  78     3.4     |  24     |  0.69   25 May 2020 06:58    139    |  108    |  8      ----------------------------<  84     3.3     |  22     |  0.63   24 May 2020 06:41    139    |  110    |  8      ----------------------------<  93     3.4     |  23     |  0.66   23 May 2020 03:53    137    |  104    |  6      ----------------------------<  104    3.8     |  23     |  0.87   22 May 2020 06:20    136    |  103    |  6      ----------------------------<  69     3.6     |  21     |  0.63   21 May 2020 07:58    138    |  103    |  7      ----------------------------<  63     3.7     |  24     |  0.61     Ca    8.5        27 May 2020 07:15  Ca    8.2        26 May 2020 07:26  Ca    8.5        25 May 2020 06:58  Ca    8.5        24 May 2020 06:41  Ca    9.5        23 May 2020 03:53  Ca    8.4        22 May 2020 06:20  Ca    7.9        21 May 2020 07:58  Phos  3.2       26 May 2020 07:26  Phos  3.5       24 May 2020 06:41  Phos  2.0       23 May 2020 03:53  Phos  1.9       21 May 2020 16:20  Phos  1.9       21 May 2020 07:58  Mg     1.5       26 May 2020 07:26  Mg     1.7       23 May 2020 03:53  Mg     2.0       21 May 2020 07:58    TPro  8.1    /  Alb  3.7    /  TBili  1.2    /  DBili  .34    /  AST  45     /  ALT  34     /  AlkPhos  59     23 May 2020 03:53  TPro  6.7    /  Alb  3.1    /  TBili  1.2    /  DBili  x      /  AST  54     /  ALT  31     /  AlkPhos  46     21 May 2020 07:58              CAPILLARY BLOOD GLUCOSE              RADIOLOGY & ADDITIONAL TESTS:    Imaging Personally Reviewed:  [ ] YES  [ ] NO    Consultant(s) Notes Reviewed:  [ ] YES  [ ] NO    Care Discussed with Consultants/Other Providers [ ] YES  [ ] NO

## 2020-05-27 NOTE — PROGRESS NOTE ADULT - PROBLEM SELECTOR PLAN 1
continue PPI.  Discussed outpatient GI followup for path results.  Pt acknowledged understanding.  CBC stable.

## 2020-05-27 NOTE — DISCHARGE NOTE PROVIDER - HOSPITAL COURSE
55M with a PMH of chronic ETOH abuse with hx of alcohol withdrawal with frequent hospital admissions, GERD, HLD, alcoholic gastritis/esophagitis, PUD, hx of hypoxic respiratory failure requiring intubation due to aspiration PNA, presents for abdominal pain and blood streaked vomiting.  Patient is a poor historian, recently received IV ativan.  ED attending reports hx of abdominal pain, nausea, and vomiting.  Tachycardia resolved with fluid in ED.  .  Significant lactic acidosis.  CT abdomen performed and found no acute pathology.  Seen by GI, EGD showed no mass, gastritis and c/w Jacque Henning tear.        # Upper GI Bleed / Jacque Henning tear- continue PPI.  Discussed outpatient GI followup for path results.  Pt acknowledged understanding.             Problem/Plan - 1:    ·  Problem: UGIB (upper gastrointestinal bleed).  Plan: continue PPI.  Discussed outpatient GI followup for path results.  Pt acknowledged understanding.  CBC stable.         Problem/Plan - 2:    ·  Problem: Alcohol withdrawal syndrome without complication.  Plan: - continue CIWA protocols with Phenobarb taper.     - Cont MVI, folate, thiamine    - Drinking cessation strongly advised.         Problem/Plan - 3:    ·  Problem: Acute alcoholic gastritis without hemorrhage.  Plan: - GERD,    - on protonix.         Problem/Plan - 4:    ·  Problem: Mixed hyperlipidemia.  Plan: cont statin.         Problem/Plan - 5:    ·  Problem: Essential hypertension.  Plan: Resume outpatient meds on discharge.         Problem/Plan - 6:    Problem: Preventive measure. Plan: DVT ppx PAS.        Disposition: Stable for discharge.  Outpatient followup discussed.    Total time spent on discharge is  35  minutes.

## 2020-05-28 RX ORDER — METOPROLOL TARTRATE 50 MG
1 TABLET ORAL
Qty: 90 | Refills: 0
Start: 2020-05-28 | End: 2020-06-26

## 2020-05-28 RX ORDER — PANTOPRAZOLE SODIUM 20 MG/1
1 TABLET, DELAYED RELEASE ORAL
Qty: 60 | Refills: 0
Start: 2020-05-28 | End: 2020-06-26

## 2020-05-28 RX ORDER — THIAMINE MONONITRATE (VIT B1) 100 MG
1 TABLET ORAL
Qty: 30 | Refills: 0
Start: 2020-05-28 | End: 2020-06-26

## 2020-05-28 RX ORDER — FOLIC ACID 0.8 MG
1 TABLET ORAL
Qty: 30 | Refills: 0
Start: 2020-05-28 | End: 2020-06-26

## 2020-06-01 DIAGNOSIS — K29.71 GASTRITIS, UNSPECIFIED, WITH BLEEDING: ICD-10-CM
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
## 2020-06-01 DIAGNOSIS — D62 ACUTE POSTHEMORRHAGIC ANEMIA: ICD-10-CM

## 2020-06-01 DIAGNOSIS — K21.0 GASTRO-ESOPHAGEAL REFLUX DISEASE WITH ESOPHAGITIS: ICD-10-CM

## 2020-06-01 DIAGNOSIS — E87.2 ACIDOSIS: ICD-10-CM

## 2020-06-01 DIAGNOSIS — I10 ESSENTIAL (PRIMARY) HYPERTENSION: ICD-10-CM

## 2020-06-01 DIAGNOSIS — R00.0 TACHYCARDIA, UNSPECIFIED: ICD-10-CM

## 2020-06-01 DIAGNOSIS — R10.13 EPIGASTRIC PAIN: ICD-10-CM

## 2020-06-01 DIAGNOSIS — F10.239 ALCOHOL DEPENDENCE WITH WITHDRAWAL, UNSPECIFIED: ICD-10-CM

## 2020-06-01 DIAGNOSIS — E78.2 MIXED HYPERLIPIDEMIA: ICD-10-CM

## 2020-06-01 DIAGNOSIS — Z11.59 ENCOUNTER FOR SCREENING FOR OTHER VIRAL DISEASES: ICD-10-CM

## 2020-06-09 ENCOUNTER — INPATIENT (INPATIENT)
Facility: HOSPITAL | Age: 55
LOS: 3 days | Discharge: HOME | End: 2020-06-13
Attending: INTERNAL MEDICINE | Admitting: INTERNAL MEDICINE
Payer: MEDICAID

## 2020-06-09 VITALS
OXYGEN SATURATION: 96 % | DIASTOLIC BLOOD PRESSURE: 94 MMHG | SYSTOLIC BLOOD PRESSURE: 134 MMHG | HEART RATE: 94 BPM | RESPIRATION RATE: 18 BRPM | WEIGHT: 160.06 LBS | TEMPERATURE: 98 F | HEIGHT: 68 IN

## 2020-06-09 DIAGNOSIS — E87.2 ACIDOSIS: ICD-10-CM

## 2020-06-09 DIAGNOSIS — F10.929 ALCOHOL USE, UNSPECIFIED WITH INTOXICATION, UNSPECIFIED: ICD-10-CM

## 2020-06-09 DIAGNOSIS — E78.5 HYPERLIPIDEMIA, UNSPECIFIED: ICD-10-CM

## 2020-06-09 DIAGNOSIS — Z29.9 ENCOUNTER FOR PROPHYLACTIC MEASURES, UNSPECIFIED: ICD-10-CM

## 2020-06-09 DIAGNOSIS — K29.20 ALCOHOLIC GASTRITIS WITHOUT BLEEDING: ICD-10-CM

## 2020-06-09 LAB
ALBUMIN SERPL ELPH-MCNC: 3.6 G/DL — SIGNIFICANT CHANGE UP (ref 3.3–5)
ALBUMIN SERPL ELPH-MCNC: 3.9 G/DL — SIGNIFICANT CHANGE UP (ref 3.3–5)
ALP SERPL-CCNC: 52 U/L — SIGNIFICANT CHANGE UP (ref 40–120)
ALP SERPL-CCNC: 55 U/L — SIGNIFICANT CHANGE UP (ref 40–120)
ALT FLD-CCNC: 81 U/L — HIGH (ref 12–78)
ALT FLD-CCNC: 87 U/L — HIGH (ref 12–78)
ANION GAP SERPL CALC-SCNC: 18 MMOL/L — HIGH (ref 5–17)
AST SERPL-CCNC: 138 U/L — HIGH (ref 15–37)
AST SERPL-CCNC: 147 U/L — HIGH (ref 15–37)
BASOPHILS # BLD AUTO: 0.05 K/UL — SIGNIFICANT CHANGE UP (ref 0–0.2)
BASOPHILS NFR BLD AUTO: 1.4 % — SIGNIFICANT CHANGE UP (ref 0–2)
BILIRUB DIRECT SERPL-MCNC: 0.12 MG/DL — SIGNIFICANT CHANGE UP (ref 0.05–0.2)
BILIRUB INDIRECT FLD-MCNC: 0.4 MG/DL — SIGNIFICANT CHANGE UP (ref 0.2–1)
BILIRUB SERPL-MCNC: 0.5 MG/DL — SIGNIFICANT CHANGE UP (ref 0.2–1.2)
BILIRUB SERPL-MCNC: 0.5 MG/DL — SIGNIFICANT CHANGE UP (ref 0.2–1.2)
BUN SERPL-MCNC: 12 MG/DL — SIGNIFICANT CHANGE UP (ref 7–23)
CALCIUM SERPL-MCNC: 8.6 MG/DL — SIGNIFICANT CHANGE UP (ref 8.5–10.1)
CHLORIDE SERPL-SCNC: 92 MMOL/L — LOW (ref 96–108)
CO2 SERPL-SCNC: 23 MMOL/L — SIGNIFICANT CHANGE UP (ref 22–31)
CREAT SERPL-MCNC: 0.82 MG/DL — SIGNIFICANT CHANGE UP (ref 0.5–1.3)
EOSINOPHIL # BLD AUTO: 0.01 K/UL — SIGNIFICANT CHANGE UP (ref 0–0.5)
EOSINOPHIL NFR BLD AUTO: 0.3 % — SIGNIFICANT CHANGE UP (ref 0–6)
ETHANOL SERPL-MCNC: 332 MG/DL — HIGH (ref 0–10)
GLUCOSE SERPL-MCNC: 112 MG/DL — HIGH (ref 70–99)
HCT VFR BLD CALC: 35.4 % — LOW (ref 39–50)
HGB BLD-MCNC: 12.1 G/DL — LOW (ref 13–17)
IMM GRANULOCYTES NFR BLD AUTO: 0.3 % — SIGNIFICANT CHANGE UP (ref 0–1.5)
LACTATE SERPL-SCNC: 6.1 MMOL/L — CRITICAL HIGH (ref 0.7–2)
LIDOCAIN IGE QN: 332 U/L — SIGNIFICANT CHANGE UP (ref 73–393)
LYMPHOCYTES # BLD AUTO: 1.17 K/UL — SIGNIFICANT CHANGE UP (ref 1–3.3)
LYMPHOCYTES # BLD AUTO: 32.9 % — SIGNIFICANT CHANGE UP (ref 13–44)
MAGNESIUM SERPL-MCNC: 1.9 MG/DL — SIGNIFICANT CHANGE UP (ref 1.6–2.6)
MCHC RBC-ENTMCNC: 28.5 PG — SIGNIFICANT CHANGE UP (ref 27–34)
MCHC RBC-ENTMCNC: 34.2 GM/DL — SIGNIFICANT CHANGE UP (ref 32–36)
MCV RBC AUTO: 83.3 FL — SIGNIFICANT CHANGE UP (ref 80–100)
MONOCYTES # BLD AUTO: 0.22 K/UL — SIGNIFICANT CHANGE UP (ref 0–0.9)
MONOCYTES NFR BLD AUTO: 6.2 % — SIGNIFICANT CHANGE UP (ref 2–14)
NEUTROPHILS # BLD AUTO: 2.1 K/UL — SIGNIFICANT CHANGE UP (ref 1.8–7.4)
NEUTROPHILS NFR BLD AUTO: 58.9 % — SIGNIFICANT CHANGE UP (ref 43–77)
NRBC # BLD: 0 /100 WBCS — SIGNIFICANT CHANGE UP (ref 0–0)
PHOSPHATE SERPL-MCNC: 2.6 MG/DL — SIGNIFICANT CHANGE UP (ref 2.5–4.5)
PLATELET # BLD AUTO: 199 K/UL — SIGNIFICANT CHANGE UP (ref 150–400)
POTASSIUM SERPL-MCNC: 3.5 MMOL/L — SIGNIFICANT CHANGE UP (ref 3.5–5.3)
POTASSIUM SERPL-SCNC: 3.5 MMOL/L — SIGNIFICANT CHANGE UP (ref 3.5–5.3)
PROT SERPL-MCNC: 7.6 GM/DL — SIGNIFICANT CHANGE UP (ref 6–8.3)
PROT SERPL-MCNC: 8.6 GM/DL — HIGH (ref 6–8.3)
RBC # BLD: 4.25 M/UL — SIGNIFICANT CHANGE UP (ref 4.2–5.8)
RBC # FLD: 15.2 % — HIGH (ref 10.3–14.5)
SODIUM SERPL-SCNC: 133 MMOL/L — LOW (ref 135–145)
WBC # BLD: 3.56 K/UL — LOW (ref 3.8–10.5)
WBC # FLD AUTO: 3.56 K/UL — LOW (ref 3.8–10.5)

## 2020-06-09 PROCEDURE — 93010 ELECTROCARDIOGRAM REPORT: CPT

## 2020-06-09 PROCEDURE — 71045 X-RAY EXAM CHEST 1 VIEW: CPT | Mod: 26

## 2020-06-09 PROCEDURE — 99222 1ST HOSP IP/OBS MODERATE 55: CPT

## 2020-06-09 PROCEDURE — 74176 CT ABD & PELVIS W/O CONTRAST: CPT | Mod: 26

## 2020-06-09 PROCEDURE — 99285 EMERGENCY DEPT VISIT HI MDM: CPT

## 2020-06-09 PROCEDURE — 73120 X-RAY EXAM OF HAND: CPT | Mod: 26,RT

## 2020-06-09 RX ORDER — SODIUM CHLORIDE 9 MG/ML
1000 INJECTION INTRAMUSCULAR; INTRAVENOUS; SUBCUTANEOUS ONCE
Refills: 0 | Status: COMPLETED | OUTPATIENT
Start: 2020-06-09 | End: 2020-06-09

## 2020-06-09 RX ORDER — FAMOTIDINE 10 MG/ML
20 INJECTION INTRAVENOUS ONCE
Refills: 0 | Status: COMPLETED | OUTPATIENT
Start: 2020-06-09 | End: 2020-06-09

## 2020-06-09 RX ORDER — ATORVASTATIN CALCIUM 80 MG/1
40 TABLET, FILM COATED ORAL AT BEDTIME
Refills: 0 | Status: DISCONTINUED | OUTPATIENT
Start: 2020-06-09 | End: 2020-06-13

## 2020-06-09 RX ORDER — HEPARIN SODIUM 5000 [USP'U]/ML
5000 INJECTION INTRAVENOUS; SUBCUTANEOUS EVERY 12 HOURS
Refills: 0 | Status: DISCONTINUED | OUTPATIENT
Start: 2020-06-09 | End: 2020-06-13

## 2020-06-09 RX ORDER — IOHEXOL 300 MG/ML
30 INJECTION, SOLUTION INTRAVENOUS ONCE
Refills: 0 | Status: COMPLETED | OUTPATIENT
Start: 2020-06-09 | End: 2020-06-09

## 2020-06-09 RX ORDER — ONDANSETRON 8 MG/1
4 TABLET, FILM COATED ORAL ONCE
Refills: 0 | Status: COMPLETED | OUTPATIENT
Start: 2020-06-09 | End: 2020-06-09

## 2020-06-09 RX ADMIN — ONDANSETRON 4 MILLIGRAM(S): 8 TABLET, FILM COATED ORAL at 18:57

## 2020-06-09 RX ADMIN — Medication 2 MILLIGRAM(S): at 19:03

## 2020-06-09 RX ADMIN — Medication 30 MILLILITER(S): at 18:57

## 2020-06-09 RX ADMIN — IOHEXOL 30 MILLILITER(S): 300 INJECTION, SOLUTION INTRAVENOUS at 19:51

## 2020-06-09 RX ADMIN — SODIUM CHLORIDE 1000 MILLILITER(S): 9 INJECTION INTRAMUSCULAR; INTRAVENOUS; SUBCUTANEOUS at 20:29

## 2020-06-09 RX ADMIN — ATORVASTATIN CALCIUM 40 MILLIGRAM(S): 80 TABLET, FILM COATED ORAL at 23:59

## 2020-06-09 NOTE — ED PROVIDER NOTE - CONSTITUTIONAL, MLM
normal... Well appearing, awake, alert, oriented to person, place, time/situation and in no apparent distress; intox

## 2020-06-09 NOTE — ED PROVIDER NOTE - PATIENT PORTAL LINK FT
You can access the FollowMyHealth Patient Portal offered by [facility] by registering at the following website: [email or web address]. By joining [email or web address]’s [email or web address] portal, you will also be able to view your health information using other applications (apps) compatible with our system.

## 2020-06-09 NOTE — H&P ADULT - HISTORY OF PRESENT ILLNESS
Patient is a 55M with a PMH of chronic ETOH abuse with hx of alcohol withdrawal with frequent hospital admissions, GERD, HLD, alcoholic gastritis/esophagitis, PUD, hx of hypoxic respiratory failure requiring intubation due to aspiration PNA, presents for abdominal pain and blood streaked vomiting.  Patient is a poor historian, recently received IV ativan.  ED attending reports hx of abdominal pain, nausea, and vomiting.  Tachycardia resolved with fluid in ED.  .  Significant lactic acidosis.  CT abdomen performed and found no acute pathology.  Evaled by ICU and deemed appropriate for tele.  Will admit to tele, GI eval pending.     abd pain  Lac      91 pig valve, htn  weakness, couldnt get out of chair  cellulitis R hand  maybe fell  sassoon aware  trop 0.04  get a1c      asa  plavix  lip10  carve 25  enal 2.5  imdur full note     Patient is a 55M with a PMH of chronic ETOH abuse with hx of alcohol withdrawal with frequent hospital admissions, GERD, HLD, alcoholic gastritis/esophagitis, PUD, hx of hypoxic respiratory failure requiring intubation due to aspiration PNA, presents for abdominal pain and blood streaked vomiting.  Patient is a poor historian, recently received IV ativan.  ED attending reports hx of abdominal pain, nausea, and vomiting.  Tachycardia resolved with fluid in ED.  .  Significant lactic acidosis.  CT abdomen performed and found no acute pathology.  Evaled by ICU and deemed appropriate for tele.  Will admit to tele, GI eval pending. Patient is a 55M with a PMH of chronic ETOH abuse with hx of alcohol withdrawal with frequent hospital admissions, GERD, HLD, alcoholic gastritis/esophagitis, PUD, hx of hypoxic respiratory failure requiring intubation due to aspiration PNA, presents for abdominal pain and blood streaked vomiting.  Patient is a poor historian, recently received IV ativan.  ED attending reports hx of abdominal pain, nausea, and vomiting.  Tachycardia resolved with fluid in ED.  .  Significant lactic acidosis.  CT abdomen performed and found no acute pathology.  Evaled by ICU and deemed appropriate for tele.  Will admit to tele, GI eval pending. Patient is a 55M with a PMH of chronic ETOH abuse with hx of alcohol withdrawal with frequent hospital admissions, GERD, HLD, alcoholic gastritis/esophagitis, PUD, hx of hypoxic respiratory failure requiring intubation due to aspiration PNA, presents for abdominal pain.  Patient reports his has been having epigastric pain for the last three days with nausea vomiting and decreased PO intake.  Patient reports that he has been drinking vodka as well, last drink prior to arrival.  Labs show lactic acidosis - CT abdomen pending.  Will admit to tele.

## 2020-06-09 NOTE — H&P ADULT - NSHPREVIEWOFSYSTEMS_GEN_ALL_CORE
Constitutional: no fever, chills, night sweats  Ears: no hearing changes or ear pain,   Nose: no nasal congestion, sinus pain, or rhinorrhea  Cardio: no chest pain, orthopnea, edema, or palpitations  Resp: no dyspnea, cough, wheezing  GI: nausea, vomiting positive.  No diarrhea, constipation, hematochezia, or melena  : no dysuria, urinary frequency, hematuria  MSK: no back pain, neck pain  Skin: no rash, pruritis   Neuro: no weakness, dizziness, lightheadedness, syncope   Heme/Lymph: no bruising or bleeding

## 2020-06-09 NOTE — ED PROVIDER NOTE - OBJECTIVE STATEMENT
54 yo male with ETOH abuse presents with abdominal pain, Last drink was yesterday. Patient denies fever, chills, chest pain.

## 2020-06-09 NOTE — ED PROVIDER NOTE - CARE PLAN
Principal Discharge DX:	Abdominal pain Principal Discharge DX:	Abdominal pain  Secondary Diagnosis:	Alcohol intoxication Principal Discharge DX:	Abdominal pain  Secondary Diagnosis:	EtOH dependence

## 2020-06-09 NOTE — H&P ADULT - PROBLEM SELECTOR PLAN 1
blood alcohol level 332  Patient a high risk of alcohol withdrawal - required ICU care a few months  Ativan 2 ivp q6 with slow taper

## 2020-06-09 NOTE — H&P ADULT - PROBLEM SELECTOR PLAN 2
recently had EGD that showed mild chronic gastritis  Tylenol 650 mg PRN pain/fever recently had EGD that showed mild chronic gastritis  Tylenol 650 mg PRN pain/fever  protonix, sucralfrate

## 2020-06-09 NOTE — H&P ADULT - NSHPPHYSICALEXAM_GEN_ALL_CORE
Physical exam:  General: patient in no acute distress, resting comfortably  Head:  Atraumatic, Normocephalic  Eyes: EOMI, PERRLA, clear sclera  Neck: Supple, thyroid nontender, non enlarged  Cardio: S1/S2 +ve, regular rate and rhythm, no M/G/R  Resp: clear to ausculation bilaterally, no rales or wheezes  GI: abdomen soft, with tenderness to palpation, non distended, BS +ve x 4  Ext: no significant pedal edema  Neuro: CN 2-12 intact, no significant motor or sensory deficits.  Skin: No rashes or lesions Physical exam:  General: patient in no acute distress, resting comfortably  Head:  Atraumatic, Normocephalic  Eyes: EOMI, PERRLA, clear sclera  Neck: Supple, thyroid nontender, non enlarged  Cardio: S1/S2 +ve, regular rate and rhythm, no M/G/R  Resp: clear to ausculation bilaterally, no rales or wheezes  GI: abdomen soft, nontender to palpation, non distended, BS +ve x 4  Ext: no significant pedal edema  Neuro: CN 2-12 intact, no significant motor or sensory deficits.  Skin: No rashes or lesions

## 2020-06-09 NOTE — H&P ADULT - ASSESSMENT
Patient is a 55M with a PMH of chronic ETOH abuse with hx of alcohol withdrawal with frequent hospital admissions, GERD, HLD, alcoholic gastritis/esophagitis, PUD, hx of hypoxic respiratory failure requiring intubation due to aspiration PNA, presents for abdominal pain.  Patient reports his has been having epigastric pain for the last three days with nausea vomiting and decreased PO intake.  Patient reports that he has been drinking vodka as well, last drink prior to arrival.  Labs show lactic acidosis - CT abdomen pending.  Will admit to tele.

## 2020-06-09 NOTE — H&P ADULT - NSHPLABSRESULTS_GEN_ALL_CORE
Recent Vitals  T(C): 37.3 (06-09-20 @ 22:08), Max: 37.3 (06-09-20 @ 22:08)  HR: 85 (06-09-20 @ 22:08) (85 - 94)  BP: 120/79 (06-09-20 @ 22:08) (120/79 - 134/94)  RR: 20 (06-09-20 @ 22:08) (18 - 20)  SpO2: 96% (06-09-20 @ 16:47) (96% - 96%)                        12.1   3.56  )-----------( 199      ( 09 Jun 2020 18:05 )             35.4     06-09    133<L>  |  92<L>  |  12  ----------------------------<  112<H>  3.5   |  23  |  0.82    Ca    8.6      09 Jun 2020 18:35    TPro  7.6  /  Alb  3.6  /  TBili  0.5  /  DBili  .12  /  AST  138<H>  /  ALT  81<H>  /  AlkPhos  52  06-09      LIVER FUNCTIONS - ( 09 Jun 2020 21:59 )  Alb: 3.6 g/dL / Pro: 7.6 gm/dL / ALK PHOS: 52 U/L / ALT: 81 U/L / AST: 138 U/L / GGT: x               Home Medications:

## 2020-06-10 LAB
APPEARANCE UR: CLEAR — SIGNIFICANT CHANGE UP
BACTERIA # UR AUTO: ABNORMAL
BILIRUB UR-MCNC: ABNORMAL
COLOR SPEC: YELLOW — SIGNIFICANT CHANGE UP
DIFF PNL FLD: ABNORMAL
EPI CELLS # UR: NEGATIVE — SIGNIFICANT CHANGE UP
GLUCOSE UR QL: NEGATIVE MG/DL — SIGNIFICANT CHANGE UP
KETONES UR-MCNC: ABNORMAL
LACTATE SERPL-SCNC: 3.1 MMOL/L — HIGH (ref 0.7–2)
LEUKOCYTE ESTERASE UR-ACNC: ABNORMAL
NITRITE UR-MCNC: NEGATIVE — SIGNIFICANT CHANGE UP
PH UR: 6 — SIGNIFICANT CHANGE UP (ref 5–8)
PROT UR-MCNC: 30 MG/DL
RBC CASTS # UR COMP ASSIST: SIGNIFICANT CHANGE UP /HPF (ref 0–4)
SARS-COV-2 RNA SPEC QL NAA+PROBE: SIGNIFICANT CHANGE UP
SP GR SPEC: 1.01 — SIGNIFICANT CHANGE UP (ref 1.01–1.02)
UROBILINOGEN FLD QL: 1 MG/DL
WBC UR QL: SIGNIFICANT CHANGE UP

## 2020-06-10 PROCEDURE — 99232 SBSQ HOSP IP/OBS MODERATE 35: CPT

## 2020-06-10 RX ORDER — SUCRALFATE 1 G
1 TABLET ORAL
Refills: 0 | Status: DISCONTINUED | OUTPATIENT
Start: 2020-06-10 | End: 2020-06-13

## 2020-06-10 RX ORDER — PANTOPRAZOLE SODIUM 20 MG/1
40 TABLET, DELAYED RELEASE ORAL
Refills: 0 | Status: DISCONTINUED | OUTPATIENT
Start: 2020-06-10 | End: 2020-06-11

## 2020-06-10 RX ORDER — THIAMINE MONONITRATE (VIT B1) 100 MG
100 TABLET ORAL DAILY
Refills: 0 | Status: DISCONTINUED | OUTPATIENT
Start: 2020-06-10 | End: 2020-06-13

## 2020-06-10 RX ORDER — POLYETHYLENE GLYCOL 3350 17 G/17G
17 POWDER, FOR SOLUTION ORAL DAILY
Refills: 0 | Status: DISCONTINUED | OUTPATIENT
Start: 2020-06-10 | End: 2020-06-13

## 2020-06-10 RX ORDER — KETOROLAC TROMETHAMINE 30 MG/ML
30 SYRINGE (ML) INJECTION ONCE
Refills: 0 | Status: DISCONTINUED | OUTPATIENT
Start: 2020-06-10 | End: 2020-06-10

## 2020-06-10 RX ORDER — SENNA PLUS 8.6 MG/1
2 TABLET ORAL AT BEDTIME
Refills: 0 | Status: DISCONTINUED | OUTPATIENT
Start: 2020-06-10 | End: 2020-06-13

## 2020-06-10 RX ORDER — ACETAMINOPHEN 500 MG
650 TABLET ORAL EVERY 6 HOURS
Refills: 0 | Status: COMPLETED | OUTPATIENT
Start: 2020-06-10 | End: 2020-06-12

## 2020-06-10 RX ORDER — FOLIC ACID 0.8 MG
1 TABLET ORAL DAILY
Refills: 0 | Status: DISCONTINUED | OUTPATIENT
Start: 2020-06-10 | End: 2020-06-13

## 2020-06-10 RX ADMIN — Medication 2 MILLIGRAM(S): at 00:05

## 2020-06-10 RX ADMIN — HEPARIN SODIUM 5000 UNIT(S): 5000 INJECTION INTRAVENOUS; SUBCUTANEOUS at 05:18

## 2020-06-10 RX ADMIN — Medication 30 MILLIGRAM(S): at 01:20

## 2020-06-10 RX ADMIN — Medication 1 MILLIGRAM(S): at 11:21

## 2020-06-10 RX ADMIN — SENNA PLUS 2 TABLET(S): 8.6 TABLET ORAL at 21:27

## 2020-06-10 RX ADMIN — Medication 2 MILLIGRAM(S): at 22:21

## 2020-06-10 RX ADMIN — Medication 1 TABLET(S): at 11:21

## 2020-06-10 RX ADMIN — Medication 2 MILLIGRAM(S): at 17:29

## 2020-06-10 RX ADMIN — Medication 100 MILLIGRAM(S): at 11:21

## 2020-06-10 RX ADMIN — Medication 2 MILLIGRAM(S): at 13:20

## 2020-06-10 RX ADMIN — Medication 2 MILLIGRAM(S): at 05:17

## 2020-06-10 RX ADMIN — Medication 1 GRAM(S): at 17:08

## 2020-06-10 RX ADMIN — Medication 1 GRAM(S): at 11:21

## 2020-06-10 RX ADMIN — Medication 2 MILLIGRAM(S): at 11:21

## 2020-06-10 RX ADMIN — HEPARIN SODIUM 5000 UNIT(S): 5000 INJECTION INTRAVENOUS; SUBCUTANEOUS at 17:30

## 2020-06-10 RX ADMIN — Medication 1 GRAM(S): at 05:18

## 2020-06-10 RX ADMIN — PANTOPRAZOLE SODIUM 40 MILLIGRAM(S): 20 TABLET, DELAYED RELEASE ORAL at 17:29

## 2020-06-10 RX ADMIN — PANTOPRAZOLE SODIUM 40 MILLIGRAM(S): 20 TABLET, DELAYED RELEASE ORAL at 01:20

## 2020-06-10 RX ADMIN — ATORVASTATIN CALCIUM 40 MILLIGRAM(S): 80 TABLET, FILM COATED ORAL at 21:27

## 2020-06-10 NOTE — PROGRESS NOTE ADULT - SUBJECTIVE AND OBJECTIVE BOX
54 yo M with a history of chronic ETOH abuse w/ DTs with frequent hospital admissions, HLD, alcoholic gastritis/esophagitis/GERD/PUD, hx of hypoxic respiratory failure requiring intubation due to aspiration PNA, presents for abdominal pain after recent vodka consumption.  He is lying in bed in NAD.      MEDICATIONS  (STANDING):  atorvastatin 40 milliGRAM(s) Oral at bedtime  folic acid 1 milliGRAM(s) Oral daily  heparin   Injectable 5000 Unit(s) SubCutaneous every 12 hours  multivitamin 1 Tablet(s) Oral daily  pantoprazole  Injectable 40 milliGRAM(s) IV Push two times a day  senna 2 Tablet(s) Oral at bedtime  sucralfate 1 Gram(s) Oral four times a day  thiamine 100 milliGRAM(s) Oral daily    MEDICATIONS  (PRN):  acetaminophen   Tablet .. 650 milliGRAM(s) Oral every 6 hours PRN Mild Pain (1 - 3)  LORazepam   Injectable 2 milliGRAM(s) IV Push every 1 hour PRN alcohol withdrawal  polyethylene glycol 3350 17 Gram(s) Oral daily PRN constipatiom      Allergies    No Known Allergies    Intolerances        Vital Signs Last 24 Hrs  T(C): 37.1 (10 Jovan 2020 16:35), Max: 37.3 (10 Jovan 2020 13:37)  T(F): 98.8 (10 Jovan 2020 16:35), Max: 99.1 (10 Jovan 2020 13:37)  HR: 100 (10 Jovan 2020 16:35) (91 - 104)  BP: 134/74 (10 Jovan 2020 16:35) (115/73 - 134/74)  BP(mean): --  RR: 18 (10 Jovan 2020 16:35) (16 - 18)  SpO2: 98% (10 Jovan 2020 16:35) (96% - 99%)    PHYSICAL EXAM:  GENERAL: NAD, well-groomed, well-developed  HEAD:  Atraumatic, Normocephalic  EYES: EOMI, PERRLA   NECK: Supple  NERVOUS SYSTEM:  Alert, no tremors  CHEST/LUNG: Clear to auscultation bilaterally; No rales, rhonchi, wheezing, or rubs  HEART: Regular rate and rhythm; No murmurs, rubs, or gallops  ABDOMEN: Soft, Nontender, firm, Bowel sounds present  EXTREMITIES:   No clubbing, cyanosis, or edema     LABS:                        12.1   3.56  )-----------( 199      ( 2020 18:05 )             35.4     06-09    133<L>  |  92<L>  |  12  ----------------------------<  112<H>  3.5   |  23  |  0.82    Ca    8.6      2020 18:35  Phos  2.6     06-09  Mg     1.9     -    TPro  7.6  /  Alb  3.6  /  TBili  0.5  /  DBili  .12  /  AST  138<H>  /  ALT  81<H>  /  AlkPhos  52  06-09      Urinalysis Basic - ( 10 Jovan 2020 15:08 )    Color: Yellow / Appearance: Clear / S.015 / pH: x  Gluc: x / Ketone: Moderate  / Bili: Moderate / Urobili: 1 mg/dL   Blood: x / Protein: 30 mg/dL / Nitrite: Negative   Leuk Esterase: Trace / RBC: 0-2 /HPF / WBC 0-2   Sq Epi: x / Non Sq Epi: Negative / Bacteria: Occasional      CAPILLARY BLOOD GLUCOSE          RADIOLOGY & ADDITIONAL TESTS:    06-10-20 @ 07:01  -  06-10-20 @ 22:35  --------------------------------------------------------  IN:    Oral Fluid: 380 mL  Total IN: 380 mL    OUT:  Total OUT: 0 mL    Total NET: 380 mL

## 2020-06-10 NOTE — PROGRESS NOTE ADULT - ASSESSMENT
54 yo M with a history of chronic ETOH abuse w/ DTs with frequent hospital admissions, HLD, alcoholic gastritis/esophagitis/GERD/PUD, hx of hypoxic respiratory failure requiring intubation due to aspiration PNA, presents for abdominal pain after recent vodka consumption.      Alcoholic intoxication with complication  - blood alcohol level 332 on admission  - patient a high risk of alcohol withdrawal - required ICU care a few maleonths  - will watch for withdrawal   - c/w folic acid, MVN and thiamine    Chronic alcoholic gastritis without hemorrhage  - as per H&P, he recently had EGD that showed mild chronic gastritis  - c/w Tylenol 650 mg PRN pain/fever  - c/w Protonix & sucralfate   - CT abd showed no acute abdominal pathology but there was hepatic steatosis    Lactic acid acidosis  - improving     Leukopenia  - likely due to ETOH use    Dyslipidemia  - c/w Lipitor     Prophylaxis:  DVT: heparins  GI: Protonix

## 2020-06-11 LAB
ANION GAP SERPL CALC-SCNC: 6 MMOL/L — SIGNIFICANT CHANGE UP (ref 5–17)
BUN SERPL-MCNC: 13 MG/DL — SIGNIFICANT CHANGE UP (ref 7–23)
CALCIUM SERPL-MCNC: 9.1 MG/DL — SIGNIFICANT CHANGE UP (ref 8.5–10.1)
CHLORIDE SERPL-SCNC: 100 MMOL/L — SIGNIFICANT CHANGE UP (ref 96–108)
CO2 SERPL-SCNC: 28 MMOL/L — SIGNIFICANT CHANGE UP (ref 22–31)
CREAT SERPL-MCNC: 0.95 MG/DL — SIGNIFICANT CHANGE UP (ref 0.5–1.3)
GLUCOSE SERPL-MCNC: 91 MG/DL — SIGNIFICANT CHANGE UP (ref 70–99)
HCT VFR BLD CALC: 33.6 % — LOW (ref 39–50)
HGB BLD-MCNC: 11 G/DL — LOW (ref 13–17)
LACTATE SERPL-SCNC: 1.4 MMOL/L — SIGNIFICANT CHANGE UP (ref 0.7–2)
MAGNESIUM SERPL-MCNC: 1.9 MG/DL — SIGNIFICANT CHANGE UP (ref 1.6–2.6)
MCHC RBC-ENTMCNC: 28.6 PG — SIGNIFICANT CHANGE UP (ref 27–34)
MCHC RBC-ENTMCNC: 32.7 GM/DL — SIGNIFICANT CHANGE UP (ref 32–36)
MCV RBC AUTO: 87.3 FL — SIGNIFICANT CHANGE UP (ref 80–100)
NRBC # BLD: 0 /100 WBCS — SIGNIFICANT CHANGE UP (ref 0–0)
PHOSPHATE SERPL-MCNC: 2.6 MG/DL — SIGNIFICANT CHANGE UP (ref 2.5–4.5)
PLATELET # BLD AUTO: 102 K/UL — LOW (ref 150–400)
POTASSIUM SERPL-MCNC: 3.4 MMOL/L — LOW (ref 3.5–5.3)
POTASSIUM SERPL-SCNC: 3.4 MMOL/L — LOW (ref 3.5–5.3)
RBC # BLD: 3.85 M/UL — LOW (ref 4.2–5.8)
RBC # FLD: 14.9 % — HIGH (ref 10.3–14.5)
SODIUM SERPL-SCNC: 134 MMOL/L — LOW (ref 135–145)
WBC # BLD: 2.56 K/UL — LOW (ref 3.8–10.5)
WBC # FLD AUTO: 2.56 K/UL — LOW (ref 3.8–10.5)

## 2020-06-11 PROCEDURE — 99232 SBSQ HOSP IP/OBS MODERATE 35: CPT

## 2020-06-11 RX ORDER — POTASSIUM CHLORIDE 20 MEQ
40 PACKET (EA) ORAL EVERY 4 HOURS
Refills: 0 | Status: COMPLETED | OUTPATIENT
Start: 2020-06-11 | End: 2020-06-11

## 2020-06-11 RX ORDER — PANTOPRAZOLE SODIUM 20 MG/1
40 TABLET, DELAYED RELEASE ORAL
Refills: 0 | Status: DISCONTINUED | OUTPATIENT
Start: 2020-06-11 | End: 2020-06-13

## 2020-06-11 RX ADMIN — PANTOPRAZOLE SODIUM 40 MILLIGRAM(S): 20 TABLET, DELAYED RELEASE ORAL at 17:13

## 2020-06-11 RX ADMIN — Medication 2 MILLIGRAM(S): at 05:19

## 2020-06-11 RX ADMIN — Medication 1 GRAM(S): at 23:26

## 2020-06-11 RX ADMIN — Medication 40 MILLIEQUIVALENT(S): at 15:23

## 2020-06-11 RX ADMIN — Medication 650 MILLIGRAM(S): at 18:12

## 2020-06-11 RX ADMIN — Medication 2 MILLIGRAM(S): at 19:55

## 2020-06-11 RX ADMIN — Medication 40 MILLIEQUIVALENT(S): at 11:09

## 2020-06-11 RX ADMIN — HEPARIN SODIUM 5000 UNIT(S): 5000 INJECTION INTRAVENOUS; SUBCUTANEOUS at 17:12

## 2020-06-11 RX ADMIN — Medication 1 TABLET(S): at 11:07

## 2020-06-11 RX ADMIN — Medication 1 GRAM(S): at 11:07

## 2020-06-11 RX ADMIN — Medication 1 GRAM(S): at 05:19

## 2020-06-11 RX ADMIN — Medication 1 GRAM(S): at 00:04

## 2020-06-11 RX ADMIN — HEPARIN SODIUM 5000 UNIT(S): 5000 INJECTION INTRAVENOUS; SUBCUTANEOUS at 05:18

## 2020-06-11 RX ADMIN — Medication 1 GRAM(S): at 17:12

## 2020-06-11 RX ADMIN — PANTOPRAZOLE SODIUM 40 MILLIGRAM(S): 20 TABLET, DELAYED RELEASE ORAL at 05:18

## 2020-06-11 RX ADMIN — ATORVASTATIN CALCIUM 40 MILLIGRAM(S): 80 TABLET, FILM COATED ORAL at 21:14

## 2020-06-11 RX ADMIN — Medication 100 MILLIGRAM(S): at 11:07

## 2020-06-11 RX ADMIN — Medication 1 MILLIGRAM(S): at 11:07

## 2020-06-11 NOTE — PROGRESS NOTE ADULT - ASSESSMENT
56 yo M with a history of chronic ETOH abuse w/ DTs with frequent hospital admissions, HLD, alcoholic gastritis/esophagitis/GERD/PUD, hx of hypoxic respiratory failure requiring intubation due to aspiration PNA, presents for abdominal pain after recent vodka consumption.      Alcoholic intoxication with complication  - blood alcohol level 332 on admission  - patient a high risk of alcohol withdrawal - required ICU care a few maleonths  - will watch for withdrawal   - c/w folic acid, MVN and thiamine    Chronic alcoholic gastritis without hemorrhage  - as per H&P, he recently had EGD that showed mild chronic gastritis  - c/w Tylenol 650 mg PRN pain/fever  - c/w Protonix & sucralfate   - CT abd showed no acute abdominal pathology but there was hepatic steatosis    Lactic acid acidosis  - resolved    Hypokalemia  - replace w/ KCl    Leukopenia  - likely due to ETOH use    Dyslipidemia  - c/w Lipitor     Prophylaxis:  DVT: heparins  GI: Protonix

## 2020-06-11 NOTE — PROGRESS NOTE ADULT - SUBJECTIVE AND OBJECTIVE BOX
56 yo M with a history of chronic ETOH abuse w/ DTs with frequent hospital admissions, HLD, alcoholic gastritis/esophagitis/GERD/PUD, hx of hypoxic respiratory failure requiring intubation due to aspiration PNA, presents for abdominal pain after recent vodka consumption.  He is lying in bed in NAD.     MEDICATIONS  (STANDING):  atorvastatin 40 milliGRAM(s) Oral at bedtime  folic acid 1 milliGRAM(s) Oral daily  heparin   Injectable 5000 Unit(s) SubCutaneous every 12 hours  multivitamin 1 Tablet(s) Oral daily  pantoprazole  Injectable 40 milliGRAM(s) IV Push two times a day  potassium chloride    Tablet ER 40 milliEquivalent(s) Oral every 4 hours  senna 2 Tablet(s) Oral at bedtime  sucralfate 1 Gram(s) Oral four times a day  thiamine 100 milliGRAM(s) Oral daily    MEDICATIONS  (PRN):  acetaminophen   Tablet .. 650 milliGRAM(s) Oral every 6 hours PRN Mild Pain (1 - 3)  LORazepam   Injectable 2 milliGRAM(s) IV Push every 1 hour PRN alcohol withdrawal  polyethylene glycol 3350 17 Gram(s) Oral daily PRN constipatiom      Allergies    No Known Allergies    Intolerances        Vital Signs Last 24 Hrs  T(C): 37.2 (2020 11:21), Max: 37.4 (10 Jovan 2020 23:17)  T(F): 99 (2020 11:21), Max: 99.4 (10 Jovan 2020 23:17)  HR: 103 (2020 11:21) (82 - 103)  BP: 109/80 (2020 11:21) (109/80 - 134/74)  BP(mean): --  RR: 16 (2020 11:21) (16 - 18)  SpO2: 97% (2020 11:21) (97% - 98%)    PHYSICAL EXAM:  GENERAL: NAD, well-groomed, well-developed  HEAD:  Atraumatic, Normocephalic  EYES: EOMI, PERRLA   NECK: Supple  NERVOUS SYSTEM:  Alert, mild tremors  CHEST/LUNG: Clear to auscultation bilaterally; No rales, rhonchi, wheezing, or rubs  HEART: Regular rate and rhythm; No murmurs, rubs, or gallops  ABDOMEN: Soft, Nontender, firm, Bowel sounds present  EXTREMITIES:   No clubbing, cyanosis, or edema    LABS:                        11.0   2.56  )-----------( 102      ( 2020 06:57 )             33.6     06-11    134<L>  |  100  |  13  ----------------------------<  91  3.4<L>   |  28  |  0.95    Ca    9.1      2020 06:57  Phos  2.6     -  Mg     1.9         TPro  7.6  /  Alb  3.6  /  TBili  0.5  /  DBili  .12  /  AST  138<H>  /  ALT  81<H>  /  AlkPhos  52  -09      Urinalysis Basic - ( 10 Jovan 2020 15:08 )    Color: Yellow / Appearance: Clear / S.015 / pH: x  Gluc: x / Ketone: Moderate  / Bili: Moderate / Urobili: 1 mg/dL   Blood: x / Protein: 30 mg/dL / Nitrite: Negative   Leuk Esterase: Trace / RBC: 0-2 /HPF / WBC 0-2   Sq Epi: x / Non Sq Epi: Negative / Bacteria: Occasional    RADIOLOGY & ADDITIONAL TESTS:    06-10-20 @ 07:01  -  20 @ 07:00  --------------------------------------------------------  IN:    Oral Fluid: 620 mL  Total IN: 620 mL    OUT:  Total OUT: 0 mL    Total NET: 620 mL

## 2020-06-12 LAB
ALBUMIN SERPL ELPH-MCNC: 3.5 G/DL — SIGNIFICANT CHANGE UP (ref 3.3–5)
ALP SERPL-CCNC: 51 U/L — SIGNIFICANT CHANGE UP (ref 40–120)
ALT FLD-CCNC: 73 U/L — SIGNIFICANT CHANGE UP (ref 12–78)
ANION GAP SERPL CALC-SCNC: 8 MMOL/L — SIGNIFICANT CHANGE UP (ref 5–17)
AST SERPL-CCNC: 82 U/L — HIGH (ref 15–37)
BILIRUB SERPL-MCNC: 0.7 MG/DL — SIGNIFICANT CHANGE UP (ref 0.2–1.2)
BUN SERPL-MCNC: 12 MG/DL — SIGNIFICANT CHANGE UP (ref 7–23)
CALCIUM SERPL-MCNC: 9.6 MG/DL — SIGNIFICANT CHANGE UP (ref 8.5–10.1)
CHLORIDE SERPL-SCNC: 102 MMOL/L — SIGNIFICANT CHANGE UP (ref 96–108)
CO2 SERPL-SCNC: 27 MMOL/L — SIGNIFICANT CHANGE UP (ref 22–31)
CREAT SERPL-MCNC: 0.87 MG/DL — SIGNIFICANT CHANGE UP (ref 0.5–1.3)
GLUCOSE SERPL-MCNC: 84 MG/DL — SIGNIFICANT CHANGE UP (ref 70–99)
HCT VFR BLD CALC: 35.7 % — LOW (ref 39–50)
HGB BLD-MCNC: 11.3 G/DL — LOW (ref 13–17)
INR BLD: 0.99 RATIO — SIGNIFICANT CHANGE UP (ref 0.88–1.16)
MAGNESIUM SERPL-MCNC: 2 MG/DL — SIGNIFICANT CHANGE UP (ref 1.6–2.6)
MCHC RBC-ENTMCNC: 28.5 PG — SIGNIFICANT CHANGE UP (ref 27–34)
MCHC RBC-ENTMCNC: 31.7 GM/DL — LOW (ref 32–36)
MCV RBC AUTO: 90.2 FL — SIGNIFICANT CHANGE UP (ref 80–100)
NRBC # BLD: 0 /100 WBCS — SIGNIFICANT CHANGE UP (ref 0–0)
PHOSPHATE SERPL-MCNC: 2.2 MG/DL — LOW (ref 2.5–4.5)
PLATELET # BLD AUTO: 113 K/UL — LOW (ref 150–400)
POTASSIUM SERPL-MCNC: 4.5 MMOL/L — SIGNIFICANT CHANGE UP (ref 3.5–5.3)
POTASSIUM SERPL-SCNC: 4.5 MMOL/L — SIGNIFICANT CHANGE UP (ref 3.5–5.3)
PROT SERPL-MCNC: 7.9 GM/DL — SIGNIFICANT CHANGE UP (ref 6–8.3)
PROTHROM AB SERPL-ACNC: 11 SEC — SIGNIFICANT CHANGE UP (ref 10–12.9)
RBC # BLD: 3.96 M/UL — LOW (ref 4.2–5.8)
RBC # FLD: 15 % — HIGH (ref 10.3–14.5)
SODIUM SERPL-SCNC: 137 MMOL/L — SIGNIFICANT CHANGE UP (ref 135–145)
WBC # BLD: 2.52 K/UL — LOW (ref 3.8–10.5)
WBC # FLD AUTO: 2.52 K/UL — LOW (ref 3.8–10.5)

## 2020-06-12 PROCEDURE — 99232 SBSQ HOSP IP/OBS MODERATE 35: CPT

## 2020-06-12 RX ORDER — SODIUM,POTASSIUM PHOSPHATES 278-250MG
2 POWDER IN PACKET (EA) ORAL
Refills: 0 | Status: COMPLETED | OUTPATIENT
Start: 2020-06-12 | End: 2020-06-13

## 2020-06-12 RX ADMIN — Medication 1 GRAM(S): at 06:10

## 2020-06-12 RX ADMIN — Medication 2 PACKET(S): at 22:42

## 2020-06-12 RX ADMIN — Medication 2 PACKET(S): at 11:30

## 2020-06-12 RX ADMIN — Medication 30 MILLILITER(S): at 11:27

## 2020-06-12 RX ADMIN — Medication 2 PACKET(S): at 17:17

## 2020-06-12 RX ADMIN — ATORVASTATIN CALCIUM 40 MILLIGRAM(S): 80 TABLET, FILM COATED ORAL at 22:41

## 2020-06-12 RX ADMIN — Medication 1 GRAM(S): at 11:23

## 2020-06-12 RX ADMIN — PANTOPRAZOLE SODIUM 40 MILLIGRAM(S): 20 TABLET, DELAYED RELEASE ORAL at 17:20

## 2020-06-12 RX ADMIN — Medication 650 MILLIGRAM(S): at 11:27

## 2020-06-12 RX ADMIN — HEPARIN SODIUM 5000 UNIT(S): 5000 INJECTION INTRAVENOUS; SUBCUTANEOUS at 06:10

## 2020-06-12 RX ADMIN — SENNA PLUS 2 TABLET(S): 8.6 TABLET ORAL at 22:40

## 2020-06-12 RX ADMIN — Medication 1 TABLET(S): at 11:23

## 2020-06-12 RX ADMIN — HEPARIN SODIUM 5000 UNIT(S): 5000 INJECTION INTRAVENOUS; SUBCUTANEOUS at 17:19

## 2020-06-12 RX ADMIN — Medication 1 MILLIGRAM(S): at 11:23

## 2020-06-12 RX ADMIN — PANTOPRAZOLE SODIUM 40 MILLIGRAM(S): 20 TABLET, DELAYED RELEASE ORAL at 06:10

## 2020-06-12 RX ADMIN — Medication 100 MILLIGRAM(S): at 11:23

## 2020-06-12 RX ADMIN — Medication 1 GRAM(S): at 17:17

## 2020-06-12 NOTE — PROGRESS NOTE ADULT - ASSESSMENT
56 yo M with a history of chronic ETOH abuse w/ DTs with frequent hospital admissions, HLD, alcoholic gastritis/esophagitis/GERD/PUD, hx of hypoxic respiratory failure requiring intubation due to aspiration PNA, presents for abdominal pain after recent vodka consumption.      Alcoholic intoxication with complication  - blood alcohol level 332 on admission  - patient a high risk of alcohol withdrawal - required ICU care a few maleonths  - will watch for withdrawal   - c/w folic acid, MVN and thiamine    Chronic alcoholic gastritis without hemorrhage  - as per H&P, he recently had EGD that showed mild chronic gastritis  - c/w Tylenol 650 mg PRN pain/fever  - c/w Protonix & sucralfate   - CT abd showed no acute abdominal pathology but there was hepatic steatosis    Phos  - replace w/ Phos    Lactic acid acidosis  - resolved    Leukopenia  - likely due to chronic ETOH use    Dyslipidemia  - c/w Lipitor     Prophylaxis:  DVT: heparin   GI: Protonix

## 2020-06-12 NOTE — PROGRESS NOTE ADULT - SUBJECTIVE AND OBJECTIVE BOX
54 yo M with a history of chronic ETOH abuse w/ DTs with frequent hospital admissions, HLD, alcoholic gastritis/esophagitis/GERD/PUD, hx of hypoxic respiratory failure requiring intubation due to aspiration PNA, presents for abdominal pain after recent vodka consumption.  He is lying in bed in NAD.     MEDICATIONS  (STANDING):  atorvastatin 40 milliGRAM(s) Oral at bedtime  folic acid 1 milliGRAM(s) Oral daily  heparin   Injectable 5000 Unit(s) SubCutaneous every 12 hours  multivitamin 1 Tablet(s) Oral daily  pantoprazole    Tablet 40 milliGRAM(s) Oral two times a day  potassium phosphate / sodium phosphate powder 2 Packet(s) Oral four times a day with meals  senna 2 Tablet(s) Oral at bedtime  sucralfate 1 Gram(s) Oral four times a day  thiamine 100 milliGRAM(s) Oral daily    MEDICATIONS  (PRN):  aluminum hydroxide/magnesium hydroxide/simethicone Suspension 30 milliLiter(s) Oral every 6 hours PRN Dyspepsia  chlordiazePOXIDE 25 milliGRAM(s) Oral every 6 hours PRN Anxiety and/or mild Alcohol Withdrawal Symptoms  LORazepam   Injectable 2 milliGRAM(s) IV Push every 1 hour PRN alcohol withdrawal  polyethylene glycol 3350 17 Gram(s) Oral daily PRN constipatiom      Allergies    No Known Allergies    Intolerances        Vital Signs Last 24 Hrs  T(C): 36.9 (12 Jun 2020 16:40), Max: 37 (11 Jun 2020 23:23)  T(F): 98.5 (12 Jun 2020 16:40), Max: 98.6 (11 Jun 2020 23:23)  HR: 70 (12 Jun 2020 16:40) (70 - 92)  BP: 113/76 (12 Jun 2020 16:40) (109/78 - 119/88)  BP(mean): 87 (12 Jun 2020 11:02) (87 - 87)  RR: 18 (12 Jun 2020 16:40) (17 - 18)  SpO2: 99% (12 Jun 2020 16:40) (96% - 99%)    PHYSICAL EXAM:  GENERAL: NAD, well-groomed, well-developed  HEAD:  Atraumatic, Normocephalic  EYES: EOMI, PERRLA   NECK: Supple  NERVOUS SYSTEM:  Alert, mild tremors  CHEST/LUNG: Clear to auscultation bilaterally; No rales, rhonchi, wheezing, or rubs  HEART: Regular rate and rhythm; No murmurs, rubs, or gallops  ABDOMEN: Soft, Nontender, firm, Bowel sounds present  EXTREMITIES:   No clubbing, cyanosis, or edema    LABS:                        11.3   2.52  )-----------( 113      ( 12 Jun 2020 07:44 )             35.7     06-12    137  |  102  |  12  ----------------------------<  84  4.5   |  27  |  0.87    Ca    9.6      12 Jun 2020 07:44  Phos  2.2     06-12  Mg     2.0     06-12    TPro  7.9  /  Alb  3.5  /  TBili  0.7  /  DBili  x   /  AST  82<H>  /  ALT  73  /  AlkPhos  51  06-12    PT/INR - ( 12 Jun 2020 07:44 )   PT: 11.0 sec;   INR: 0.99 ratio             CAPILLARY BLOOD GLUCOSE          RADIOLOGY & ADDITIONAL TESTS:

## 2020-06-13 ENCOUNTER — TRANSCRIPTION ENCOUNTER (OUTPATIENT)
Age: 55
End: 2020-06-13

## 2020-06-13 VITALS
RESPIRATION RATE: 18 BRPM | SYSTOLIC BLOOD PRESSURE: 129 MMHG | HEART RATE: 84 BPM | OXYGEN SATURATION: 99 % | TEMPERATURE: 98 F | DIASTOLIC BLOOD PRESSURE: 82 MMHG

## 2020-06-13 LAB
ANION GAP SERPL CALC-SCNC: 11 MMOL/L — SIGNIFICANT CHANGE UP (ref 5–17)
BUN SERPL-MCNC: 21 MG/DL — SIGNIFICANT CHANGE UP (ref 7–23)
CALCIUM SERPL-MCNC: 8.9 MG/DL — SIGNIFICANT CHANGE UP (ref 8.5–10.1)
CHLORIDE SERPL-SCNC: 104 MMOL/L — SIGNIFICANT CHANGE UP (ref 96–108)
CO2 SERPL-SCNC: 23 MMOL/L — SIGNIFICANT CHANGE UP (ref 22–31)
CREAT SERPL-MCNC: 0.8 MG/DL — SIGNIFICANT CHANGE UP (ref 0.5–1.3)
GLUCOSE SERPL-MCNC: 90 MG/DL — SIGNIFICANT CHANGE UP (ref 70–99)
HCT VFR BLD CALC: 31.4 % — LOW (ref 39–50)
HGB BLD-MCNC: 10.1 G/DL — LOW (ref 13–17)
MAGNESIUM SERPL-MCNC: 1.4 MG/DL — LOW (ref 1.6–2.6)
MCHC RBC-ENTMCNC: 28.2 PG — SIGNIFICANT CHANGE UP (ref 27–34)
MCHC RBC-ENTMCNC: 32.2 GM/DL — SIGNIFICANT CHANGE UP (ref 32–36)
MCV RBC AUTO: 87.7 FL — SIGNIFICANT CHANGE UP (ref 80–100)
NRBC # BLD: 0 /100 WBCS — SIGNIFICANT CHANGE UP (ref 0–0)
PHOSPHATE SERPL-MCNC: 3 MG/DL — SIGNIFICANT CHANGE UP (ref 2.5–4.5)
PLATELET # BLD AUTO: 120 K/UL — LOW (ref 150–400)
POTASSIUM SERPL-MCNC: 3.5 MMOL/L — SIGNIFICANT CHANGE UP (ref 3.5–5.3)
POTASSIUM SERPL-SCNC: 3.5 MMOL/L — SIGNIFICANT CHANGE UP (ref 3.5–5.3)
RBC # BLD: 3.58 M/UL — LOW (ref 4.2–5.8)
RBC # FLD: 15.1 % — HIGH (ref 10.3–14.5)
SODIUM SERPL-SCNC: 138 MMOL/L — SIGNIFICANT CHANGE UP (ref 135–145)
WBC # BLD: 4.3 K/UL — SIGNIFICANT CHANGE UP (ref 3.8–10.5)
WBC # FLD AUTO: 4.3 K/UL — SIGNIFICANT CHANGE UP (ref 3.8–10.5)

## 2020-06-13 PROCEDURE — 99239 HOSP IP/OBS DSCHRG MGMT >30: CPT

## 2020-06-13 RX ORDER — PANTOPRAZOLE SODIUM 20 MG/1
1 TABLET, DELAYED RELEASE ORAL
Qty: 28 | Refills: 0
Start: 2020-06-13 | End: 2020-06-26

## 2020-06-13 RX ORDER — MAGNESIUM SULFATE 500 MG/ML
2 VIAL (ML) INJECTION EVERY 4 HOURS
Refills: 0 | Status: COMPLETED | OUTPATIENT
Start: 2020-06-13 | End: 2020-06-13

## 2020-06-13 RX ADMIN — HEPARIN SODIUM 5000 UNIT(S): 5000 INJECTION INTRAVENOUS; SUBCUTANEOUS at 05:18

## 2020-06-13 RX ADMIN — Medication 1 GRAM(S): at 17:33

## 2020-06-13 RX ADMIN — Medication 1 GRAM(S): at 05:18

## 2020-06-13 RX ADMIN — Medication 100 MILLIGRAM(S): at 11:19

## 2020-06-13 RX ADMIN — PANTOPRAZOLE SODIUM 40 MILLIGRAM(S): 20 TABLET, DELAYED RELEASE ORAL at 05:18

## 2020-06-13 RX ADMIN — Medication 50 GRAM(S): at 11:19

## 2020-06-13 RX ADMIN — Medication 50 GRAM(S): at 16:05

## 2020-06-13 RX ADMIN — HEPARIN SODIUM 5000 UNIT(S): 5000 INJECTION INTRAVENOUS; SUBCUTANEOUS at 17:32

## 2020-06-13 RX ADMIN — Medication 1 GRAM(S): at 11:19

## 2020-06-13 RX ADMIN — POLYETHYLENE GLYCOL 3350 17 GRAM(S): 17 POWDER, FOR SOLUTION ORAL at 12:44

## 2020-06-13 RX ADMIN — Medication 2 PACKET(S): at 08:01

## 2020-06-13 RX ADMIN — PANTOPRAZOLE SODIUM 40 MILLIGRAM(S): 20 TABLET, DELAYED RELEASE ORAL at 17:32

## 2020-06-13 RX ADMIN — Medication 1 TABLET(S): at 11:19

## 2020-06-13 RX ADMIN — Medication 1 GRAM(S): at 00:17

## 2020-06-13 RX ADMIN — Medication 1 MILLIGRAM(S): at 11:19

## 2020-06-13 NOTE — DISCHARGE NOTE NURSING/CASE MANAGEMENT/SOCIAL WORK - PATIENT PORTAL LINK FT
You can access the FollowMyHealth Patient Portal offered by [facility] by registering at the following website: [email or web address]. By joining [employer]’s FollowMyHealth portal, you will also be able to view your health information using other applications (apps) compatible with our system.

## 2020-06-13 NOTE — DISCHARGE NOTE PROVIDER - NSDCCPCAREPLAN_GEN_ALL_CORE_FT
PRINCIPAL DISCHARGE DIAGNOSIS  Diagnosis: Abdominal pain  Assessment and Plan of Treatment:       SECONDARY DISCHARGE DIAGNOSES  Diagnosis: Alcohol intoxication  Assessment and Plan of Treatment:

## 2020-06-13 NOTE — DISCHARGE NOTE PROVIDER - HOSPITAL COURSE
54 yo M with a history of chronic ETOH abuse w/ DTs with frequent hospital admissions, HLD, alcoholic gastritis/ esophagitis /GERD /PUD, hx of hypoxic respiratory failure requiring intubation due to aspiration PNA, presents for abdominal pain after recent vodka consumption. His blood alcohol level was 332 on admission. He also had lactic acidosis that resolved and pancytopenia, likley from ETOH use. He displayed mild symptoms of alcohol withdrawal that resolved. Pt's abd pain was likely due to chronic alcoholic gastritis without hemorrhage w/ recent ETOH use. CT abd showed no acute abdominal pathology but there was hepatic steatosis and patient was informed and advised to stop taking ETOH. His symptoms improved w/ Maalox, Protonix & sucralfate.        Discharge time: 43 minutes

## 2020-06-13 NOTE — DISCHARGE NOTE PROVIDER - NSDCMRMEDTOKEN_GEN_ALL_CORE_FT
atorvastatin 20 mg oral tablet: 1 tab(s) orally once a day (at bedtime)  folic acid 1 mg oral tablet: 1 tab(s) orally once a day  Multiple Vitamins oral tablet: 1 tab(s) orally once a day  pantoprazole 40 mg oral delayed release tablet: 1 tab(s) orally 2 times a day   thiamine 100 mg oral tablet: 1 tab(s) orally once a day

## 2020-06-17 DIAGNOSIS — K70.0 ALCOHOLIC FATTY LIVER: ICD-10-CM

## 2020-06-17 DIAGNOSIS — K29.20 ALCOHOLIC GASTRITIS WITHOUT BLEEDING: ICD-10-CM

## 2020-06-17 DIAGNOSIS — K20.8 OTHER ESOPHAGITIS: ICD-10-CM

## 2020-06-17 DIAGNOSIS — F10.229 ALCOHOL DEPENDENCE WITH INTOXICATION, UNSPECIFIED: ICD-10-CM
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
## 2020-06-17 DIAGNOSIS — E87.2 ACIDOSIS: ICD-10-CM

## 2020-06-17 DIAGNOSIS — F10.239 ALCOHOL DEPENDENCE WITH WITHDRAWAL, UNSPECIFIED: ICD-10-CM

## 2020-06-17 DIAGNOSIS — E78.2 MIXED HYPERLIPIDEMIA: ICD-10-CM

## 2020-06-17 DIAGNOSIS — F10.288 ALCOHOL DEPENDENCE WITH OTHER ALCOHOL-INDUCED DISORDER: ICD-10-CM

## 2020-06-17 DIAGNOSIS — E87.6 HYPOKALEMIA: ICD-10-CM

## 2020-06-17 DIAGNOSIS — R10.9 UNSPECIFIED ABDOMINAL PAIN: ICD-10-CM

## 2020-06-17 DIAGNOSIS — D61.818 OTHER PANCYTOPENIA: ICD-10-CM

## 2020-06-20 ENCOUNTER — EMERGENCY (EMERGENCY)
Facility: HOSPITAL | Age: 55
LOS: 0 days | Discharge: LEFT BEFORE DISCHARGE | End: 2020-06-21
Attending: EMERGENCY MEDICINE
Payer: MEDICAID

## 2020-06-20 VITALS
RESPIRATION RATE: 16 BRPM | DIASTOLIC BLOOD PRESSURE: 70 MMHG | HEIGHT: 68 IN | WEIGHT: 184.97 LBS | SYSTOLIC BLOOD PRESSURE: 119 MMHG | OXYGEN SATURATION: 97 % | HEART RATE: 114 BPM | TEMPERATURE: 98 F

## 2020-06-20 DIAGNOSIS — F10.129 ALCOHOL ABUSE WITH INTOXICATION, UNSPECIFIED: ICD-10-CM

## 2020-06-20 DIAGNOSIS — K29.70 GASTRITIS, UNSPECIFIED, WITHOUT BLEEDING: ICD-10-CM

## 2020-06-20 DIAGNOSIS — R10.9 UNSPECIFIED ABDOMINAL PAIN: ICD-10-CM

## 2020-06-20 DIAGNOSIS — F10.231 ALCOHOL DEPENDENCE WITH WITHDRAWAL DELIRIUM: ICD-10-CM

## 2020-06-20 DIAGNOSIS — E78.5 HYPERLIPIDEMIA, UNSPECIFIED: ICD-10-CM

## 2020-06-20 DIAGNOSIS — K27.9 PEPTIC ULCER, SITE UNSPECIFIED, UNSPECIFIED AS ACUTE OR CHRONIC, WITHOUT HEMORRHAGE OR PERFORATION: ICD-10-CM

## 2020-06-20 LAB
ALBUMIN SERPL ELPH-MCNC: 4.1 G/DL — SIGNIFICANT CHANGE UP (ref 3.3–5)
ALP SERPL-CCNC: 52 U/L — SIGNIFICANT CHANGE UP (ref 40–120)
ALT FLD-CCNC: 144 U/L — HIGH (ref 12–78)
ANION GAP SERPL CALC-SCNC: 16 MMOL/L — SIGNIFICANT CHANGE UP (ref 5–17)
AST SERPL-CCNC: 122 U/L — HIGH (ref 15–37)
BASOPHILS # BLD AUTO: 0.05 K/UL — SIGNIFICANT CHANGE UP (ref 0–0.2)
BASOPHILS NFR BLD AUTO: 1.8 % — SIGNIFICANT CHANGE UP (ref 0–2)
BILIRUB SERPL-MCNC: 0.6 MG/DL — SIGNIFICANT CHANGE UP (ref 0.2–1.2)
BUN SERPL-MCNC: 11 MG/DL — SIGNIFICANT CHANGE UP (ref 7–23)
CALCIUM SERPL-MCNC: 9 MG/DL — SIGNIFICANT CHANGE UP (ref 8.5–10.1)
CHLORIDE SERPL-SCNC: 100 MMOL/L — SIGNIFICANT CHANGE UP (ref 96–108)
CO2 SERPL-SCNC: 22 MMOL/L — SIGNIFICANT CHANGE UP (ref 22–31)
CREAT SERPL-MCNC: 0.85 MG/DL — SIGNIFICANT CHANGE UP (ref 0.5–1.3)
EOSINOPHIL # BLD AUTO: 0.03 K/UL — SIGNIFICANT CHANGE UP (ref 0–0.5)
EOSINOPHIL NFR BLD AUTO: 1.1 % — SIGNIFICANT CHANGE UP (ref 0–6)
ETHANOL SERPL-MCNC: 440 MG/DL — HIGH (ref 0–10)
GLUCOSE SERPL-MCNC: 91 MG/DL — SIGNIFICANT CHANGE UP (ref 70–99)
HCT VFR BLD CALC: 34.8 % — LOW (ref 39–50)
HGB BLD-MCNC: 11.9 G/DL — LOW (ref 13–17)
IMM GRANULOCYTES NFR BLD AUTO: 0 % — SIGNIFICANT CHANGE UP (ref 0–1.5)
LIDOCAIN IGE QN: 382 U/L — SIGNIFICANT CHANGE UP (ref 73–393)
LYMPHOCYTES # BLD AUTO: 1.6 K/UL — SIGNIFICANT CHANGE UP (ref 1–3.3)
LYMPHOCYTES # BLD AUTO: 56.7 % — HIGH (ref 13–44)
MCHC RBC-ENTMCNC: 28.8 PG — SIGNIFICANT CHANGE UP (ref 27–34)
MCHC RBC-ENTMCNC: 34.2 GM/DL — SIGNIFICANT CHANGE UP (ref 32–36)
MCV RBC AUTO: 84.3 FL — SIGNIFICANT CHANGE UP (ref 80–100)
MONOCYTES # BLD AUTO: 0.34 K/UL — SIGNIFICANT CHANGE UP (ref 0–0.9)
MONOCYTES NFR BLD AUTO: 12.1 % — SIGNIFICANT CHANGE UP (ref 2–14)
NEUTROPHILS # BLD AUTO: 0.8 K/UL — LOW (ref 1.8–7.4)
NEUTROPHILS NFR BLD AUTO: 28.3 % — LOW (ref 43–77)
NRBC # BLD: 0 /100 WBCS — SIGNIFICANT CHANGE UP (ref 0–0)
PLATELET # BLD AUTO: 239 K/UL — SIGNIFICANT CHANGE UP (ref 150–400)
POTASSIUM SERPL-MCNC: 3.6 MMOL/L — SIGNIFICANT CHANGE UP (ref 3.5–5.3)
POTASSIUM SERPL-SCNC: 3.6 MMOL/L — SIGNIFICANT CHANGE UP (ref 3.5–5.3)
PROT SERPL-MCNC: 9.1 GM/DL — HIGH (ref 6–8.3)
RBC # BLD: 4.13 M/UL — LOW (ref 4.2–5.8)
RBC # FLD: 15 % — HIGH (ref 10.3–14.5)
SODIUM SERPL-SCNC: 138 MMOL/L — SIGNIFICANT CHANGE UP (ref 135–145)
WBC # BLD: 2.82 K/UL — LOW (ref 3.8–10.5)
WBC # FLD AUTO: 2.82 K/UL — LOW (ref 3.8–10.5)

## 2020-06-20 PROCEDURE — 99284 EMERGENCY DEPT VISIT MOD MDM: CPT

## 2020-06-20 RX ORDER — KETOROLAC TROMETHAMINE 30 MG/ML
30 SYRINGE (ML) INJECTION ONCE
Refills: 0 | Status: COMPLETED | OUTPATIENT
Start: 2020-06-20 | End: 2020-06-20

## 2020-06-20 RX ORDER — SODIUM CHLORIDE 9 MG/ML
1000 INJECTION INTRAMUSCULAR; INTRAVENOUS; SUBCUTANEOUS ONCE
Refills: 0 | Status: COMPLETED | OUTPATIENT
Start: 2020-06-20 | End: 2020-06-20

## 2020-06-20 RX ORDER — ONDANSETRON 8 MG/1
4 TABLET, FILM COATED ORAL ONCE
Refills: 0 | Status: COMPLETED | OUTPATIENT
Start: 2020-06-20 | End: 2020-06-20

## 2020-06-20 RX ORDER — FAMOTIDINE 10 MG/ML
20 INJECTION INTRAVENOUS ONCE
Refills: 0 | Status: COMPLETED | OUTPATIENT
Start: 2020-06-20 | End: 2020-06-20

## 2020-06-20 RX ORDER — LIDOCAINE 4 G/100G
10 CREAM TOPICAL ONCE
Refills: 0 | Status: COMPLETED | OUTPATIENT
Start: 2020-06-20 | End: 2020-06-20

## 2020-06-20 RX ADMIN — LIDOCAINE 10 MILLILITER(S): 4 CREAM TOPICAL at 20:10

## 2020-06-20 RX ADMIN — ONDANSETRON 4 MILLIGRAM(S): 8 TABLET, FILM COATED ORAL at 20:10

## 2020-06-20 RX ADMIN — ONDANSETRON 4 MILLIGRAM(S): 8 TABLET, FILM COATED ORAL at 22:55

## 2020-06-20 RX ADMIN — FAMOTIDINE 20 MILLIGRAM(S): 10 INJECTION INTRAVENOUS at 20:10

## 2020-06-20 RX ADMIN — Medication 30 MILLILITER(S): at 23:43

## 2020-06-20 RX ADMIN — Medication 30 MILLILITER(S): at 20:10

## 2020-06-20 RX ADMIN — SODIUM CHLORIDE 1000 MILLILITER(S): 9 INJECTION INTRAMUSCULAR; INTRAVENOUS; SUBCUTANEOUS at 20:10

## 2020-06-20 RX ADMIN — SODIUM CHLORIDE 1000 MILLILITER(S): 9 INJECTION INTRAMUSCULAR; INTRAVENOUS; SUBCUTANEOUS at 21:55

## 2020-06-20 NOTE — ED PROVIDER NOTE - PROGRESS NOTE DETAILS
Pt walking around ED with steady gait, speaking with clear speech. Stating he wanted to leave. Clinically sober. Prior to planned discharge, patient eloped.

## 2020-06-20 NOTE — ED PROVIDER NOTE - OBJECTIVE STATEMENT
56 y/o M with a PMHx of EtOH abuse and Gastritis presents to the ED c/o abdominal burning s/p drinking alcohol. Patient last drink was yesterday. Patient reports his symptoms are similar to with he had gastritis. In the ED, Patient is asking for Maalox. Denies chest pain, SOB, fever or chills. Patient does state he has slight nausea.

## 2020-06-20 NOTE — ED ADULT NURSE NOTE - OBJECTIVE STATEMENT
received er bed p barillas c/o abdominal pain and n/v after binge drinking past 2 days denies drinking today but appears intoxicated a&ox3 cooperative

## 2020-06-20 NOTE — ED PROVIDER NOTE - CLINICAL SUMMARY MEDICAL DECISION MAKING FREE TEXT BOX
DDx: Alcohol gastritis/pancreatitis/no abdominal tenderness or guarding to suggest surgical abdomen.   Plan: CBC, CMP, Lipase, Fluids, GI cocktail and reassess.

## 2020-06-21 ENCOUNTER — EMERGENCY (EMERGENCY)
Facility: HOSPITAL | Age: 55
LOS: 0 days | Discharge: HOME | End: 2020-06-22
Attending: EMERGENCY MEDICINE
Payer: MEDICAID

## 2020-06-21 VITALS
DIASTOLIC BLOOD PRESSURE: 72 MMHG | TEMPERATURE: 98 F | OXYGEN SATURATION: 99 % | SYSTOLIC BLOOD PRESSURE: 126 MMHG | HEART RATE: 81 BPM | RESPIRATION RATE: 18 BRPM

## 2020-06-21 VITALS
HEART RATE: 104 BPM | RESPIRATION RATE: 18 BRPM | HEIGHT: 68 IN | OXYGEN SATURATION: 98 % | DIASTOLIC BLOOD PRESSURE: 96 MMHG | WEIGHT: 147.05 LBS | TEMPERATURE: 98 F | SYSTOLIC BLOOD PRESSURE: 133 MMHG

## 2020-06-21 DIAGNOSIS — K29.70 GASTRITIS, UNSPECIFIED, WITHOUT BLEEDING: ICD-10-CM
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
## 2020-06-21 DIAGNOSIS — E78.5 HYPERLIPIDEMIA, UNSPECIFIED: ICD-10-CM

## 2020-06-21 DIAGNOSIS — F10.231 ALCOHOL DEPENDENCE WITH WITHDRAWAL DELIRIUM: ICD-10-CM

## 2020-06-21 DIAGNOSIS — K85.20 ALCOHOL INDUCED ACUTE PANCREATITIS WITHOUT NECROSIS OR INFECTION: ICD-10-CM

## 2020-06-21 DIAGNOSIS — R10.13 EPIGASTRIC PAIN: ICD-10-CM

## 2020-06-21 DIAGNOSIS — K27.9 PEPTIC ULCER, SITE UNSPECIFIED, UNSPECIFIED AS ACUTE OR CHRONIC, WITHOUT HEMORRHAGE OR PERFORATION: ICD-10-CM

## 2020-06-21 DIAGNOSIS — F19.19 OTHER PSYCHOACTIVE SUBSTANCE ABUSE WITH UNSPECIFIED PSYCHOACTIVE SUBSTANCE-INDUCED DISORDER: ICD-10-CM

## 2020-06-21 LAB
ALBUMIN SERPL ELPH-MCNC: 4 G/DL — SIGNIFICANT CHANGE UP (ref 3.3–5)
ALP SERPL-CCNC: 48 U/L — SIGNIFICANT CHANGE UP (ref 40–120)
ALT FLD-CCNC: 123 U/L — HIGH (ref 12–78)
ANION GAP SERPL CALC-SCNC: 12 MMOL/L — SIGNIFICANT CHANGE UP (ref 5–17)
AST SERPL-CCNC: 103 U/L — HIGH (ref 15–37)
BASOPHILS # BLD AUTO: 0.06 K/UL — SIGNIFICANT CHANGE UP (ref 0–0.2)
BASOPHILS NFR BLD AUTO: 1.9 % — SIGNIFICANT CHANGE UP (ref 0–2)
BILIRUB SERPL-MCNC: 0.5 MG/DL — SIGNIFICANT CHANGE UP (ref 0.2–1.2)
BUN SERPL-MCNC: 9 MG/DL — SIGNIFICANT CHANGE UP (ref 7–23)
CALCIUM SERPL-MCNC: 9.1 MG/DL — SIGNIFICANT CHANGE UP (ref 8.5–10.1)
CHLORIDE SERPL-SCNC: 101 MMOL/L — SIGNIFICANT CHANGE UP (ref 96–108)
CO2 SERPL-SCNC: 25 MMOL/L — SIGNIFICANT CHANGE UP (ref 22–31)
CREAT SERPL-MCNC: 0.91 MG/DL — SIGNIFICANT CHANGE UP (ref 0.5–1.3)
EOSINOPHIL # BLD AUTO: 0.01 K/UL — SIGNIFICANT CHANGE UP (ref 0–0.5)
EOSINOPHIL NFR BLD AUTO: 0.3 % — SIGNIFICANT CHANGE UP (ref 0–6)
ETHANOL SERPL-MCNC: 303 MG/DL — SIGNIFICANT CHANGE UP (ref 0–10)
GLUCOSE SERPL-MCNC: 89 MG/DL — SIGNIFICANT CHANGE UP (ref 70–99)
HCT VFR BLD CALC: 32.9 % — LOW (ref 39–50)
HGB BLD-MCNC: 11.3 G/DL — LOW (ref 13–17)
IMM GRANULOCYTES NFR BLD AUTO: 0 % — SIGNIFICANT CHANGE UP (ref 0–1.5)
LIDOCAIN IGE QN: 441 U/L — HIGH (ref 73–393)
LYMPHOCYTES # BLD AUTO: 1.86 K/UL — SIGNIFICANT CHANGE UP (ref 1–3.3)
LYMPHOCYTES # BLD AUTO: 59.2 % — HIGH (ref 13–44)
MCHC RBC-ENTMCNC: 29.5 PG — SIGNIFICANT CHANGE UP (ref 27–34)
MCHC RBC-ENTMCNC: 34.3 GM/DL — SIGNIFICANT CHANGE UP (ref 32–36)
MCV RBC AUTO: 85.9 FL — SIGNIFICANT CHANGE UP (ref 80–100)
MONOCYTES # BLD AUTO: 0.34 K/UL — SIGNIFICANT CHANGE UP (ref 0–0.9)
MONOCYTES NFR BLD AUTO: 10.8 % — SIGNIFICANT CHANGE UP (ref 2–14)
NEUTROPHILS # BLD AUTO: 0.87 K/UL — LOW (ref 1.8–7.4)
NEUTROPHILS NFR BLD AUTO: 27.8 % — LOW (ref 43–77)
NRBC # BLD: 0 /100 WBCS — SIGNIFICANT CHANGE UP (ref 0–0)
PLATELET # BLD AUTO: 223 K/UL — SIGNIFICANT CHANGE UP (ref 150–400)
POTASSIUM SERPL-MCNC: 3.7 MMOL/L — SIGNIFICANT CHANGE UP (ref 3.5–5.3)
POTASSIUM SERPL-SCNC: 3.7 MMOL/L — SIGNIFICANT CHANGE UP (ref 3.5–5.3)
PROT SERPL-MCNC: 8.3 GM/DL — SIGNIFICANT CHANGE UP (ref 6–8.3)
RBC # BLD: 3.83 M/UL — LOW (ref 4.2–5.8)
RBC # FLD: 15 % — HIGH (ref 10.3–14.5)
SODIUM SERPL-SCNC: 138 MMOL/L — SIGNIFICANT CHANGE UP (ref 135–145)
WBC # BLD: 3.14 K/UL — LOW (ref 3.8–10.5)
WBC # FLD AUTO: 3.14 K/UL — LOW (ref 3.8–10.5)

## 2020-06-21 PROCEDURE — 99284 EMERGENCY DEPT VISIT MOD MDM: CPT

## 2020-06-21 RX ORDER — SODIUM CHLORIDE 9 MG/ML
1000 INJECTION INTRAMUSCULAR; INTRAVENOUS; SUBCUTANEOUS ONCE
Refills: 0 | Status: COMPLETED | OUTPATIENT
Start: 2020-06-21 | End: 2020-06-21

## 2020-06-21 RX ORDER — ONDANSETRON 8 MG/1
4 TABLET, FILM COATED ORAL ONCE
Refills: 0 | Status: COMPLETED | OUTPATIENT
Start: 2020-06-21 | End: 2020-06-21

## 2020-06-21 RX ORDER — FAMOTIDINE 10 MG/ML
20 INJECTION INTRAVENOUS ONCE
Refills: 0 | Status: COMPLETED | OUTPATIENT
Start: 2020-06-21 | End: 2020-06-21

## 2020-06-21 RX ORDER — KETOROLAC TROMETHAMINE 30 MG/ML
30 SYRINGE (ML) INJECTION ONCE
Refills: 0 | Status: DISCONTINUED | OUTPATIENT
Start: 2020-06-21 | End: 2020-06-21

## 2020-06-21 RX ORDER — LIDOCAINE 4 G/100G
10 CREAM TOPICAL ONCE
Refills: 0 | Status: COMPLETED | OUTPATIENT
Start: 2020-06-21 | End: 2020-06-21

## 2020-06-21 RX ADMIN — ONDANSETRON 4 MILLIGRAM(S): 8 TABLET, FILM COATED ORAL at 22:05

## 2020-06-21 RX ADMIN — LIDOCAINE 10 MILLILITER(S): 4 CREAM TOPICAL at 21:47

## 2020-06-21 RX ADMIN — FAMOTIDINE 20 MILLIGRAM(S): 10 INJECTION INTRAVENOUS at 23:40

## 2020-06-21 RX ADMIN — Medication 30 MILLIGRAM(S): at 22:06

## 2020-06-21 RX ADMIN — Medication 30 MILLILITER(S): at 21:47

## 2020-06-21 RX ADMIN — SODIUM CHLORIDE 1000 MILLILITER(S): 9 INJECTION INTRAMUSCULAR; INTRAVENOUS; SUBCUTANEOUS at 22:06

## 2020-06-21 RX ADMIN — SODIUM CHLORIDE 1000 MILLILITER(S): 9 INJECTION INTRAMUSCULAR; INTRAVENOUS; SUBCUTANEOUS at 23:40

## 2020-06-21 RX ADMIN — Medication 30 MILLIGRAM(S): at 23:40

## 2020-06-21 NOTE — ED PROVIDER NOTE - PATIENT PORTAL LINK FT
You can access the FollowMyHealth Patient Portal offered by [facility] by registering at the following website: [email or web address]. By joining [employer]’s FollowMyHealth portal, you will also be able to view your health information using other applications (apps) compatible with our system.

## 2020-06-21 NOTE — ED PROVIDER NOTE - PROGRESS NOTE DETAILS
Pt lipase elevated, consistent with alcoholic pancreatitis. His pain resolved after gi cocktail and toradol. Pt is requesting discharge, and son is here to pick him up and will assume responsibility for patient. pt is awake and alert.

## 2020-06-21 NOTE — ED PROVIDER NOTE - OBJECTIVE STATEMENT
56 y/o M with a PMHx of EtOH abuse and Gastritis presents to the ED c/o epigastric abdominal pain similar to what he always had. Patient had similar presentation in the ED yesterday and was given GI cocktail with improvement. Patient has admitted to drinking again today. Denies chest pain, SOB or fevers.

## 2020-06-21 NOTE — ED ADULT NURSE REASSESSMENT NOTE - NS ED NURSE REASSESS COMMENT FT1
Patient eloped from ED at 00.17hrswith iv in his left foot prior to discharge planning with MD Dinh. ADM Santi informed , 5th precint  called, patient was found by HAILEY. IV was removed, patient at home with family.

## 2020-06-21 NOTE — ED ADULT TRIAGE NOTE - CHIEF COMPLAINT QUOTE
c/o abdominal pain with n/v hx chronic etoh abuse states drank today seen here yesterday for same c/o

## 2020-06-21 NOTE — ED PROVIDER NOTE - CLINICAL SUMMARY MEDICAL DECISION MAKING FREE TEXT BOX
DDx: Pancreatitis, Alcohol gastritis, Alcohol intoxication  Plan: CBC, CMP, Lipase, GI cocktail and reassess.

## 2020-06-28 ENCOUNTER — INPATIENT (INPATIENT)
Facility: HOSPITAL | Age: 55
LOS: 4 days | Discharge: HOME | End: 2020-07-03
Attending: INTERNAL MEDICINE | Admitting: INTERNAL MEDICINE
Payer: MEDICAID

## 2020-06-28 VITALS
RESPIRATION RATE: 17 BRPM | WEIGHT: 139.99 LBS | HEIGHT: 68 IN | HEART RATE: 110 BPM | DIASTOLIC BLOOD PRESSURE: 98 MMHG | SYSTOLIC BLOOD PRESSURE: 117 MMHG | TEMPERATURE: 98 F | OXYGEN SATURATION: 98 %

## 2020-06-28 LAB
ALBUMIN SERPL ELPH-MCNC: 3.5 G/DL — SIGNIFICANT CHANGE UP (ref 3.3–5)
ALP SERPL-CCNC: 47 U/L — SIGNIFICANT CHANGE UP (ref 40–120)
ALT FLD-CCNC: 70 U/L — SIGNIFICANT CHANGE UP (ref 12–78)
ANION GAP SERPL CALC-SCNC: 11 MMOL/L — SIGNIFICANT CHANGE UP (ref 5–17)
APTT BLD: 22.3 SEC — LOW (ref 27.5–36.3)
AST SERPL-CCNC: 89 U/L — HIGH (ref 15–37)
BILIRUB SERPL-MCNC: 0.2 MG/DL — SIGNIFICANT CHANGE UP (ref 0.2–1.2)
BUN SERPL-MCNC: 8 MG/DL — SIGNIFICANT CHANGE UP (ref 7–23)
CALCIUM SERPL-MCNC: 8.3 MG/DL — LOW (ref 8.5–10.1)
CHLORIDE SERPL-SCNC: 107 MMOL/L — SIGNIFICANT CHANGE UP (ref 96–108)
CO2 SERPL-SCNC: 24 MMOL/L — SIGNIFICANT CHANGE UP (ref 22–31)
CREAT SERPL-MCNC: 0.82 MG/DL — SIGNIFICANT CHANGE UP (ref 0.5–1.3)
ETHANOL SERPL-MCNC: 450 MG/DL — HIGH (ref 0–10)
GLUCOSE SERPL-MCNC: 97 MG/DL — SIGNIFICANT CHANGE UP (ref 70–99)
HCT VFR BLD CALC: 34.1 % — LOW (ref 39–50)
HGB BLD-MCNC: 11.3 G/DL — LOW (ref 13–17)
INR BLD: 0.86 RATIO — LOW (ref 0.88–1.16)
LIDOCAIN IGE QN: 596 U/L — HIGH (ref 73–393)
MCHC RBC-ENTMCNC: 29 PG — SIGNIFICANT CHANGE UP (ref 27–34)
MCHC RBC-ENTMCNC: 33.1 GM/DL — SIGNIFICANT CHANGE UP (ref 32–36)
MCV RBC AUTO: 87.7 FL — SIGNIFICANT CHANGE UP (ref 80–100)
PLATELET # BLD AUTO: 145 K/UL — LOW (ref 150–400)
POTASSIUM SERPL-MCNC: 3.9 MMOL/L — SIGNIFICANT CHANGE UP (ref 3.5–5.3)
POTASSIUM SERPL-SCNC: 3.9 MMOL/L — SIGNIFICANT CHANGE UP (ref 3.5–5.3)
PROT SERPL-MCNC: 8 GM/DL — SIGNIFICANT CHANGE UP (ref 6–8.3)
PROTHROM AB SERPL-ACNC: 9.5 SEC — LOW (ref 10–12.9)
RBC # BLD: 3.89 M/UL — LOW (ref 4.2–5.8)
RBC # FLD: 15 % — HIGH (ref 10.3–14.5)
SARS-COV-2 RNA SPEC QL NAA+PROBE: SIGNIFICANT CHANGE UP
SODIUM SERPL-SCNC: 142 MMOL/L — SIGNIFICANT CHANGE UP (ref 135–145)
TROPONIN I SERPL-MCNC: <.015 NG/ML — SIGNIFICANT CHANGE UP (ref 0.01–0.04)
WBC # BLD: 2.07 K/UL — LOW (ref 3.8–10.5)
WBC # FLD AUTO: 2.07 K/UL — LOW (ref 3.8–10.5)

## 2020-06-28 PROCEDURE — 71045 X-RAY EXAM CHEST 1 VIEW: CPT | Mod: 26

## 2020-06-28 PROCEDURE — 99285 EMERGENCY DEPT VISIT HI MDM: CPT

## 2020-06-28 RX ORDER — SODIUM CHLORIDE 9 MG/ML
1000 INJECTION, SOLUTION INTRAVENOUS ONCE
Refills: 0 | Status: COMPLETED | OUTPATIENT
Start: 2020-06-28 | End: 2020-06-28

## 2020-06-28 RX ORDER — FOLIC ACID 0.8 MG
1 TABLET ORAL ONCE
Refills: 0 | Status: COMPLETED | OUTPATIENT
Start: 2020-06-28 | End: 2020-06-28

## 2020-06-28 RX ORDER — MAGNESIUM SULFATE 500 MG/ML
2 VIAL (ML) INJECTION ONCE
Refills: 0 | Status: COMPLETED | OUTPATIENT
Start: 2020-06-28 | End: 2020-06-28

## 2020-06-28 RX ORDER — PANTOPRAZOLE SODIUM 20 MG/1
80 TABLET, DELAYED RELEASE ORAL ONCE
Refills: 0 | Status: COMPLETED | OUTPATIENT
Start: 2020-06-28 | End: 2020-06-28

## 2020-06-28 RX ORDER — THIAMINE MONONITRATE (VIT B1) 100 MG
100 TABLET ORAL ONCE
Refills: 0 | Status: COMPLETED | OUTPATIENT
Start: 2020-06-28 | End: 2020-06-28

## 2020-06-28 RX ORDER — FAMOTIDINE 10 MG/ML
20 INJECTION INTRAVENOUS ONCE
Refills: 0 | Status: COMPLETED | OUTPATIENT
Start: 2020-06-28 | End: 2020-06-28

## 2020-06-28 RX ADMIN — Medication 50 GRAM(S): at 21:20

## 2020-06-28 RX ADMIN — Medication 1 MILLIGRAM(S): at 21:21

## 2020-06-28 RX ADMIN — SODIUM CHLORIDE 1000 MILLILITER(S): 9 INJECTION, SOLUTION INTRAVENOUS at 21:22

## 2020-06-28 RX ADMIN — Medication 100 MILLIGRAM(S): at 21:21

## 2020-06-28 RX ADMIN — PANTOPRAZOLE SODIUM 80 MILLIGRAM(S): 20 TABLET, DELAYED RELEASE ORAL at 21:22

## 2020-06-28 RX ADMIN — Medication 1 MILLIGRAM(S): at 21:20

## 2020-06-28 RX ADMIN — Medication 1 TABLET(S): at 21:21

## 2020-06-28 RX ADMIN — FAMOTIDINE 20 MILLIGRAM(S): 10 INJECTION INTRAVENOUS at 21:20

## 2020-06-28 NOTE — ED ADULT NURSE NOTE - NSIMPLEMENTINTERV_GEN_ALL_ED
Speaking Coherently
Implemented All Fall Risk Interventions:  [place] to call system. Call bell, personal items and telephone within reach. Instruct patient to call for assistance. Room bathroom lighting operational. Non-slip footwear when patient is off stretcher. Physically safe environment: no spills, clutter or unnecessary equipment. Stretcher in lowest position, wheels locked, appropriate side rails in place. Provide visual cue, wrist band, yellow gown, etc. Monitor gait and stability. Monitor for mental status changes and reorient to person, place, and time. Review medications for side effects contributing to fall risk. Reinforce activity limits and safety measures with patient and family.

## 2020-06-28 NOTE — ED PROVIDER NOTE - OBJECTIVE STATEMENT
55M w/ PMHx as below presenting with epigastric pain, nausea/vomiting x 3-4 days, worsening, exacerbated by eating, alleviated by not eating, states that he also has not drank in 1-2 days, reports feeling shaking like he is going to withdraw, reports multiple episodes of nausea/vomiting, denies any bloody vomit. Unable to tolerate PO. States that he has felt like this before. Patient also reports bright red blood per rectum with defecation, denies pain, denies melena.

## 2020-06-28 NOTE — ED PROVIDER NOTE - PHYSICAL EXAMINATION
PHYSICAL EXAMINATION:  VITALS REVIEWED.  VS tachycardic, otherwise normal  GENERALIZED APPEARANCE:  Appears older than stated age, tremulous male, appears intoxicated  SKIN:  Warm, dry, no cyanosis  HEAD:  No obvious scalp lesions  EYES:  Conjunctiva pink, no icterus  ENMT:  Mucus membranes moist, no stridor  NECK:  Supple, non-tender  CHEST AND RESPIRATORY:  Clear to auscultation B/L, good air entry B/L, equal chest expansion  HEART AND CARDIOVASCULAR:  Regular rate, no obvious murmur  ABDOMEN AND GI:  Soft, mild epigastric tenderness, non-distended.  No rebound, no guarding  EXTREMITIES:  No deformity, edema, or calf tenderness  NEURO: AAOx3, gross motor and sensory intact, tremulous, tongue fasciculations

## 2020-06-28 NOTE — ED ADULT NURSE NOTE - ED STAT RN HANDOFF DETAILS
handoff given to PRETTY Kyle pt medicated per MAR slight tremors noted. no other withdrawal symptoms noted . iv intact endorsed for continued care.

## 2020-06-28 NOTE — ED PROVIDER NOTE - NS ED ROS FT
ROS:  GEN: (-) fevers/chills, (-) weight loss, (-) fatigue/malaise  HEENT: (-) visual change, (-) photophobia, (-) nasal congestion/rhinorrhea  NECK: (-) stiffness, (-) swelling  RESP: (-) shortness of breath, (-) cough, (-) sputum, (-) hemoptysis, (-) ANGEL  CV: (-) chest pain, (-) palpitations, (-) PND/orthopnea  GI: (+) nausea, (+) vomiting, (+) pain, (-) constipation, (-) diarrhea, (-) melena, (+) BRBPR  : (-) frequency/urgency, (-) hematuria, (-) dysuria, (-) incontinence, (-) discharge  EXT: (-) edema, (-) cyanosis  ENDO: (-) heat/cold intolerance, (-) polyuria  NEURO: (-) paresthesias, (-) weakness, (-) headache, (-) dizziness, (-) syncope  MSK: no muscle pain  SKIN: (-) rash

## 2020-06-28 NOTE — ED ADULT NURSE NOTE - OBJECTIVE STATEMENT
known alcoholic PW with ETOH abuse states last drink was Saturday . C/O ABD pain associated with N/V. R hand noted to be edetamous.

## 2020-06-28 NOTE — ED PROVIDER NOTE - CLINICAL SUMMARY MEDICAL DECISION MAKING FREE TEXT BOX
55M w/ ETOH abuse, ETOH gastritis/PUD; with epigastric pain, r/o pancreatitis, r/o ACS; r/o electrolyte abnormalities; labs, imaging, monitoring, CIWA 10, symptom triggered treatment, admit

## 2020-06-29 LAB
ANION GAP SERPL CALC-SCNC: 8 MMOL/L — SIGNIFICANT CHANGE UP (ref 5–17)
ANISOCYTOSIS BLD QL: SLIGHT — SIGNIFICANT CHANGE UP
BASOPHILS # BLD AUTO: 0.04 K/UL — SIGNIFICANT CHANGE UP (ref 0–0.2)
BASOPHILS NFR BLD AUTO: 2 % — SIGNIFICANT CHANGE UP (ref 0–2)
BUN SERPL-MCNC: 9 MG/DL — SIGNIFICANT CHANGE UP (ref 7–23)
CALCIUM SERPL-MCNC: 8.1 MG/DL — LOW (ref 8.5–10.1)
CHLORIDE SERPL-SCNC: 109 MMOL/L — HIGH (ref 96–108)
CO2 SERPL-SCNC: 25 MMOL/L — SIGNIFICANT CHANGE UP (ref 22–31)
CREAT SERPL-MCNC: 0.72 MG/DL — SIGNIFICANT CHANGE UP (ref 0.5–1.3)
EOSINOPHIL # BLD AUTO: 0.04 K/UL — SIGNIFICANT CHANGE UP (ref 0–0.5)
EOSINOPHIL NFR BLD AUTO: 2 % — SIGNIFICANT CHANGE UP (ref 0–6)
GLUCOSE SERPL-MCNC: 69 MG/DL — LOW (ref 70–99)
HYPOCHROMIA BLD QL: SLIGHT — SIGNIFICANT CHANGE UP
LIDOCAIN IGE QN: 552 U/L — HIGH (ref 73–393)
LYMPHOCYTES # BLD AUTO: 1.49 K/UL — SIGNIFICANT CHANGE UP (ref 1–3.3)
LYMPHOCYTES # BLD AUTO: 72 % — HIGH (ref 13–44)
MAGNESIUM SERPL-MCNC: 2.2 MG/DL — SIGNIFICANT CHANGE UP (ref 1.6–2.6)
MANUAL SMEAR VERIFICATION: SIGNIFICANT CHANGE UP
MICROCYTES BLD QL: SLIGHT — SIGNIFICANT CHANGE UP
MONOCYTES # BLD AUTO: 0.08 K/UL — SIGNIFICANT CHANGE UP (ref 0–0.9)
MONOCYTES NFR BLD AUTO: 4 % — SIGNIFICANT CHANGE UP (ref 2–14)
NEUTROPHILS # BLD AUTO: 0.33 K/UL — LOW (ref 1.8–7.4)
NEUTROPHILS NFR BLD AUTO: 20 % — LOW (ref 43–77)
NRBC # BLD: 0 /100 — SIGNIFICANT CHANGE UP (ref 0–0)
NRBC # BLD: SIGNIFICANT CHANGE UP /100 WBCS (ref 0–0)
PLAT MORPH BLD: NORMAL — SIGNIFICANT CHANGE UP
POTASSIUM SERPL-MCNC: 3.6 MMOL/L — SIGNIFICANT CHANGE UP (ref 3.5–5.3)
POTASSIUM SERPL-SCNC: 3.6 MMOL/L — SIGNIFICANT CHANGE UP (ref 3.5–5.3)
RBC BLD AUTO: ABNORMAL
SODIUM SERPL-SCNC: 142 MMOL/L — SIGNIFICANT CHANGE UP (ref 135–145)

## 2020-06-29 PROCEDURE — 93010 ELECTROCARDIOGRAM REPORT: CPT

## 2020-06-29 PROCEDURE — 76700 US EXAM ABDOM COMPLETE: CPT | Mod: 26

## 2020-06-29 PROCEDURE — 99223 1ST HOSP IP/OBS HIGH 75: CPT | Mod: AI

## 2020-06-29 PROCEDURE — 12345: CPT | Mod: NC

## 2020-06-29 RX ORDER — ATORVASTATIN CALCIUM 80 MG/1
20 TABLET, FILM COATED ORAL AT BEDTIME
Refills: 0 | Status: DISCONTINUED | OUTPATIENT
Start: 2020-06-29 | End: 2020-07-03

## 2020-06-29 RX ORDER — FOLIC ACID 0.8 MG
1 TABLET ORAL DAILY
Refills: 0 | Status: DISCONTINUED | OUTPATIENT
Start: 2020-06-29 | End: 2020-07-03

## 2020-06-29 RX ORDER — PANTOPRAZOLE SODIUM 20 MG/1
40 TABLET, DELAYED RELEASE ORAL
Refills: 0 | Status: DISCONTINUED | OUTPATIENT
Start: 2020-06-29 | End: 2020-07-03

## 2020-06-29 RX ORDER — ONDANSETRON 8 MG/1
4 TABLET, FILM COATED ORAL
Refills: 0 | Status: DISCONTINUED | OUTPATIENT
Start: 2020-06-29 | End: 2020-07-03

## 2020-06-29 RX ORDER — FAMOTIDINE 10 MG/ML
20 INJECTION INTRAVENOUS
Refills: 0 | Status: DISCONTINUED | OUTPATIENT
Start: 2020-06-29 | End: 2020-07-03

## 2020-06-29 RX ORDER — SODIUM CHLORIDE 9 MG/ML
1000 INJECTION, SOLUTION INTRAVENOUS
Refills: 0 | Status: DISCONTINUED | OUTPATIENT
Start: 2020-06-29 | End: 2020-06-30

## 2020-06-29 RX ORDER — THIAMINE MONONITRATE (VIT B1) 100 MG
100 TABLET ORAL DAILY
Refills: 0 | Status: DISCONTINUED | OUTPATIENT
Start: 2020-06-29 | End: 2020-07-03

## 2020-06-29 RX ORDER — PHENOBARBITAL 60 MG
130 TABLET ORAL ONCE
Refills: 0 | Status: DISCONTINUED | OUTPATIENT
Start: 2020-06-29 | End: 2020-06-29

## 2020-06-29 RX ADMIN — ATORVASTATIN CALCIUM 20 MILLIGRAM(S): 80 TABLET, FILM COATED ORAL at 21:07

## 2020-06-29 RX ADMIN — Medication 1 TABLET(S): at 11:48

## 2020-06-29 RX ADMIN — Medication 2 MILLIGRAM(S): at 11:48

## 2020-06-29 RX ADMIN — FAMOTIDINE 20 MILLIGRAM(S): 10 INJECTION INTRAVENOUS at 01:36

## 2020-06-29 RX ADMIN — PANTOPRAZOLE SODIUM 40 MILLIGRAM(S): 20 TABLET, DELAYED RELEASE ORAL at 06:28

## 2020-06-29 RX ADMIN — Medication 2 MILLIGRAM(S): at 15:22

## 2020-06-29 RX ADMIN — Medication 1 MILLIGRAM(S): at 11:48

## 2020-06-29 RX ADMIN — FAMOTIDINE 20 MILLIGRAM(S): 10 INJECTION INTRAVENOUS at 17:39

## 2020-06-29 RX ADMIN — Medication 130 MILLIGRAM(S): at 23:21

## 2020-06-29 RX ADMIN — Medication 2 MILLIGRAM(S): at 06:27

## 2020-06-29 RX ADMIN — Medication 2 MILLIGRAM(S): at 21:06

## 2020-06-29 RX ADMIN — SODIUM CHLORIDE 100 MILLILITER(S): 9 INJECTION, SOLUTION INTRAVENOUS at 15:28

## 2020-06-29 RX ADMIN — Medication 2 MILLIGRAM(S): at 15:45

## 2020-06-29 RX ADMIN — ONDANSETRON 4 MILLIGRAM(S): 8 TABLET, FILM COATED ORAL at 15:45

## 2020-06-29 RX ADMIN — Medication 2 MILLIGRAM(S): at 01:36

## 2020-06-29 RX ADMIN — Medication 100 MILLIGRAM(S): at 11:48

## 2020-06-29 NOTE — CHART NOTE - NSCHARTNOTEFT_GEN_A_CORE
56 y/o male w/ ETOH abuse w/frequent admissions,  GERD, HLD, alcoholic gastritis/esophagitis, PUD, hx of hypoxic respiratory failure requiring intubation due to aspiration PNA comes w/abdominal pain was last admitted 3 wks ago for similar.  Reports pain mostly in epigastric area, worse w/eating for last 3-4 days w/decreased po intake and mostly just drinking, no fever, chills, sob, cp, palpitations, +n/+v/-d/-c no travel or sick contacts.    Tolerating diet  Ativan per CIWA protocol

## 2020-06-29 NOTE — H&P ADULT - NSHPPHYSICALEXAM_GEN_ALL_CORE
Vital Signs Last 24 Hrs  T(C): 36.7 (28 Jun 2020 20:30), Max: 36.7 (28 Jun 2020 20:30)  T(F): 98 (28 Jun 2020 20:30), Max: 98 (28 Jun 2020 20:30)  HR: 110 (28 Jun 2020 20:30) (110 - 110)  BP: 117/98 (28 Jun 2020 20:30) (117/98 - 117/98)  BP(mean): --  RR: 17 (28 Jun 2020 20:30) (17 - 17)  SpO2: 98% (28 Jun 2020 20:30) (98% - 98%)    PHYSICAL EXAM:    GENERAL: NAD, well-groomed, well-developed  HEAD:  Atraumatic, Normocephalic  EYES: EOMI, PERRLA, conjunctiva and sclera clear  ENMT: No tonsillar erythema, exudates, or enlargement; Moist mucous membranes, No lesions  NECK: Supple, No JVD, Normal thyroid  NERVOUS SYSTEM:  Alert & Oriented X3, Motor Strength 5/5 B/L upper and lower extremities; DTRs 2+ intact and symmetric slightly tremulous  CHEST/LUNG: Clear to percussion bilaterally; No rales, rhonchi, wheezing, or rubs  HEART: Regular rate and rhythm; No rubs, or gallops, +S1,S2  ABDOMEN: Soft, Nontender, Nondistended; Bowel sounds present  EXTREMITIES:  2+ Peripheral Pulses, No clubbing, cyanosis, or edema  LYMPH: No cervical adenopathy  RECTAL: deferred  BREAST: No palpatble masses, skin no lesions   : deferred  SKIN: No rashes or lesions    IMPROVE VTE Individual Risk Assessment        RISK                                                          Points  [  ] Previous VTE                                                3  [  ] Thrombophilia                                             2  [  ] Lower limb paralysis                                    2        (unable to hold up >15 seconds)    [  ] Current Cancer                                             2         (within 6 months)  [  ] Immobilization > 24 hrs                              1  [  ] ICU/CCU stay > 24 hours                            1  [  ] Age > 60                                                    1  IMPROVE VTE Score ____0_____

## 2020-06-29 NOTE — H&P ADULT - HISTORY OF PRESENT ILLNESS
Pt is a 56 y/o male w/ ETOH abuse w/frequent admissions,  GERD, HLD, alcoholic gastritis/esophagitis, PUD, hx of hypoxic respiratory failure requiring intubation due to aspiration PNA comes w/abdominal pain was last admitted 3 wks ago for similar.  Reports pain mostly in epigastric area, worse w/eating for last 3-4 days w/decreased po intake and mostly just drinking, no fever, chills, sob, cp, palpitaions, +n/+v/-d/-c no travesl or sick contacts

## 2020-06-29 NOTE — H&P ADULT - NSHPLABSRESULTS_GEN_ALL_CORE
LABS:                        11.3   2.07  )-----------( 145      ( 28 Jun 2020 21:45 )             34.1     06-28    142  |  107  |  8   ----------------------------<  97  3.9   |  24  |  0.82    Ca    8.3<L>      28 Jun 2020 21:45    TPro  8.0  /  Alb  3.5  /  TBili  0.2  /  DBili  x   /  AST  89<H>  /  ALT  70  /  AlkPhos  47  06-28    PT/INR - ( 28 Jun 2020 21:45 )   PT: 9.5 sec;   INR: 0.86 ratio         PTT - ( 28 Jun 2020 21:45 )  PTT:22.3 sec    CAPILLARY BLOOD GLUCOSE            RADIOLOGY & ADDITIONAL TESTS:    Imaging Personally Reviewed:  [ X] YES  [ ] NO

## 2020-06-30 LAB
ALBUMIN SERPL ELPH-MCNC: 3.1 G/DL — LOW (ref 3.3–5)
ALP SERPL-CCNC: 50 U/L — SIGNIFICANT CHANGE UP (ref 40–120)
ALT FLD-CCNC: 61 U/L — SIGNIFICANT CHANGE UP (ref 12–78)
ANION GAP SERPL CALC-SCNC: 7 MMOL/L — SIGNIFICANT CHANGE UP (ref 5–17)
AST SERPL-CCNC: 87 U/L — HIGH (ref 15–37)
BILIRUB SERPL-MCNC: 0.6 MG/DL — SIGNIFICANT CHANGE UP (ref 0.2–1.2)
BUN SERPL-MCNC: 9 MG/DL — SIGNIFICANT CHANGE UP (ref 7–23)
CALCIUM SERPL-MCNC: 8.8 MG/DL — SIGNIFICANT CHANGE UP (ref 8.5–10.1)
CHLORIDE SERPL-SCNC: 104 MMOL/L — SIGNIFICANT CHANGE UP (ref 96–108)
CO2 SERPL-SCNC: 27 MMOL/L — SIGNIFICANT CHANGE UP (ref 22–31)
CREAT SERPL-MCNC: 0.89 MG/DL — SIGNIFICANT CHANGE UP (ref 0.5–1.3)
GLUCOSE SERPL-MCNC: 86 MG/DL — SIGNIFICANT CHANGE UP (ref 70–99)
HCT VFR BLD CALC: 30.5 % — LOW (ref 39–50)
HGB BLD-MCNC: 10 G/DL — LOW (ref 13–17)
MCHC RBC-ENTMCNC: 29.6 PG — SIGNIFICANT CHANGE UP (ref 27–34)
MCHC RBC-ENTMCNC: 32.8 GM/DL — SIGNIFICANT CHANGE UP (ref 32–36)
MCV RBC AUTO: 90.2 FL — SIGNIFICANT CHANGE UP (ref 80–100)
NRBC # BLD: 0 /100 WBCS — SIGNIFICANT CHANGE UP (ref 0–0)
PLATELET # BLD AUTO: 100 K/UL — LOW (ref 150–400)
POTASSIUM SERPL-MCNC: 3.6 MMOL/L — SIGNIFICANT CHANGE UP (ref 3.5–5.3)
POTASSIUM SERPL-SCNC: 3.6 MMOL/L — SIGNIFICANT CHANGE UP (ref 3.5–5.3)
PROT SERPL-MCNC: 6.3 GM/DL — SIGNIFICANT CHANGE UP (ref 6–8.3)
RBC # BLD: 3.38 M/UL — LOW (ref 4.2–5.8)
RBC # FLD: 14.6 % — HIGH (ref 10.3–14.5)
SODIUM SERPL-SCNC: 138 MMOL/L — SIGNIFICANT CHANGE UP (ref 135–145)
WBC # BLD: 2.07 K/UL — LOW (ref 3.8–10.5)
WBC # FLD AUTO: 2.07 K/UL — LOW (ref 3.8–10.5)

## 2020-06-30 PROCEDURE — 99233 SBSQ HOSP IP/OBS HIGH 50: CPT

## 2020-06-30 RX ORDER — ACETAMINOPHEN 500 MG
650 TABLET ORAL EVERY 8 HOURS
Refills: 0 | Status: DISCONTINUED | OUTPATIENT
Start: 2020-06-30 | End: 2020-07-03

## 2020-06-30 RX ORDER — ENOXAPARIN SODIUM 100 MG/ML
40 INJECTION SUBCUTANEOUS DAILY
Refills: 0 | Status: DISCONTINUED | OUTPATIENT
Start: 2020-06-30 | End: 2020-07-03

## 2020-06-30 RX ORDER — LACTULOSE 10 G/15ML
10 SOLUTION ORAL ONCE
Refills: 0 | Status: COMPLETED | OUTPATIENT
Start: 2020-06-30 | End: 2020-06-30

## 2020-06-30 RX ADMIN — Medication 1.5 MILLIGRAM(S): at 11:26

## 2020-06-30 RX ADMIN — Medication 1.5 MILLIGRAM(S): at 15:22

## 2020-06-30 RX ADMIN — PANTOPRAZOLE SODIUM 40 MILLIGRAM(S): 20 TABLET, DELAYED RELEASE ORAL at 07:19

## 2020-06-30 RX ADMIN — Medication 1 TABLET(S): at 11:26

## 2020-06-30 RX ADMIN — Medication 1 MILLIGRAM(S): at 22:39

## 2020-06-30 RX ADMIN — Medication 1.5 MILLIGRAM(S): at 05:01

## 2020-06-30 RX ADMIN — Medication 1.5 MILLIGRAM(S): at 07:23

## 2020-06-30 RX ADMIN — Medication 1.5 MILLIGRAM(S): at 20:16

## 2020-06-30 RX ADMIN — Medication 1.5 MILLIGRAM(S): at 00:11

## 2020-06-30 RX ADMIN — Medication 650 MILLIGRAM(S): at 00:53

## 2020-06-30 RX ADMIN — Medication 100 MILLIGRAM(S): at 11:26

## 2020-06-30 RX ADMIN — SODIUM CHLORIDE 100 MILLILITER(S): 9 INJECTION, SOLUTION INTRAVENOUS at 07:19

## 2020-06-30 RX ADMIN — FAMOTIDINE 20 MILLIGRAM(S): 10 INJECTION INTRAVENOUS at 05:02

## 2020-06-30 RX ADMIN — FAMOTIDINE 20 MILLIGRAM(S): 10 INJECTION INTRAVENOUS at 17:06

## 2020-06-30 RX ADMIN — Medication 1 MILLIGRAM(S): at 11:26

## 2020-06-30 RX ADMIN — SODIUM CHLORIDE 100 MILLILITER(S): 9 INJECTION, SOLUTION INTRAVENOUS at 00:02

## 2020-06-30 RX ADMIN — ATORVASTATIN CALCIUM 20 MILLIGRAM(S): 80 TABLET, FILM COATED ORAL at 22:39

## 2020-06-30 RX ADMIN — ENOXAPARIN SODIUM 40 MILLIGRAM(S): 100 INJECTION SUBCUTANEOUS at 22:39

## 2020-06-30 RX ADMIN — LACTULOSE 10 GRAM(S): 10 SOLUTION ORAL at 15:19

## 2020-06-30 NOTE — PHARMACY COMMUNICATION NOTE - COMMENTS
I spoke with Jamia regarding enoxaparin and the patient's decreasing platelet count - likely due to chronic gastritis and will continue with enoxaparin. I spoke with Marlena regarding enoxaparin and the patient's decreasing platelet count - likely due to chronic gastritis and will continue with enoxaparin.

## 2020-07-01 LAB
ALBUMIN SERPL ELPH-MCNC: 3.3 G/DL — SIGNIFICANT CHANGE UP (ref 3.3–5)
ALP SERPL-CCNC: 49 U/L — SIGNIFICANT CHANGE UP (ref 40–120)
ALT FLD-CCNC: 58 U/L — SIGNIFICANT CHANGE UP (ref 12–78)
ANION GAP SERPL CALC-SCNC: 7 MMOL/L — SIGNIFICANT CHANGE UP (ref 5–17)
AST SERPL-CCNC: 58 U/L — HIGH (ref 15–37)
BILIRUB SERPL-MCNC: 0.7 MG/DL — SIGNIFICANT CHANGE UP (ref 0.2–1.2)
BUN SERPL-MCNC: 8 MG/DL — SIGNIFICANT CHANGE UP (ref 7–23)
CALCIUM SERPL-MCNC: 9.2 MG/DL — SIGNIFICANT CHANGE UP (ref 8.5–10.1)
CHLORIDE SERPL-SCNC: 104 MMOL/L — SIGNIFICANT CHANGE UP (ref 96–108)
CO2 SERPL-SCNC: 25 MMOL/L — SIGNIFICANT CHANGE UP (ref 22–31)
CREAT SERPL-MCNC: 0.8 MG/DL — SIGNIFICANT CHANGE UP (ref 0.5–1.3)
GLUCOSE SERPL-MCNC: 86 MG/DL — SIGNIFICANT CHANGE UP (ref 70–99)
HCT VFR BLD CALC: 32.3 % — LOW (ref 39–50)
HGB BLD-MCNC: 10.3 G/DL — LOW (ref 13–17)
MAGNESIUM SERPL-MCNC: 2 MG/DL — SIGNIFICANT CHANGE UP (ref 1.6–2.6)
MCHC RBC-ENTMCNC: 28.9 PG — SIGNIFICANT CHANGE UP (ref 27–34)
MCHC RBC-ENTMCNC: 31.9 GM/DL — LOW (ref 32–36)
MCV RBC AUTO: 90.7 FL — SIGNIFICANT CHANGE UP (ref 80–100)
NRBC # BLD: 0 /100 WBCS — SIGNIFICANT CHANGE UP (ref 0–0)
PHOSPHATE SERPL-MCNC: 3.9 MG/DL — SIGNIFICANT CHANGE UP (ref 2.5–4.5)
PLATELET # BLD AUTO: 106 K/UL — LOW (ref 150–400)
POTASSIUM SERPL-MCNC: 3.7 MMOL/L — SIGNIFICANT CHANGE UP (ref 3.5–5.3)
POTASSIUM SERPL-SCNC: 3.7 MMOL/L — SIGNIFICANT CHANGE UP (ref 3.5–5.3)
PROT SERPL-MCNC: 7.4 GM/DL — SIGNIFICANT CHANGE UP (ref 6–8.3)
RBC # BLD: 3.56 M/UL — LOW (ref 4.2–5.8)
RBC # FLD: 14.1 % — SIGNIFICANT CHANGE UP (ref 10.3–14.5)
SODIUM SERPL-SCNC: 136 MMOL/L — SIGNIFICANT CHANGE UP (ref 135–145)
WBC # BLD: 2.06 K/UL — LOW (ref 3.8–10.5)
WBC # FLD AUTO: 2.06 K/UL — LOW (ref 3.8–10.5)

## 2020-07-01 PROCEDURE — 99233 SBSQ HOSP IP/OBS HIGH 50: CPT

## 2020-07-01 RX ORDER — CHLORHEXIDINE GLUCONATE 213 G/1000ML
1 SOLUTION TOPICAL DAILY
Refills: 0 | Status: DISCONTINUED | OUTPATIENT
Start: 2020-07-01 | End: 2020-07-03

## 2020-07-01 RX ORDER — LANOLIN ALCOHOL/MO/W.PET/CERES
3 CREAM (GRAM) TOPICAL AT BEDTIME
Refills: 0 | Status: DISCONTINUED | OUTPATIENT
Start: 2020-07-01 | End: 2020-07-03

## 2020-07-01 RX ADMIN — FAMOTIDINE 20 MILLIGRAM(S): 10 INJECTION INTRAVENOUS at 18:02

## 2020-07-01 RX ADMIN — Medication 2 MILLIGRAM(S): at 09:09

## 2020-07-01 RX ADMIN — Medication 3 MILLIGRAM(S): at 01:06

## 2020-07-01 RX ADMIN — FAMOTIDINE 20 MILLIGRAM(S): 10 INJECTION INTRAVENOUS at 05:35

## 2020-07-01 RX ADMIN — Medication 1 MILLIGRAM(S): at 02:23

## 2020-07-01 RX ADMIN — ATORVASTATIN CALCIUM 20 MILLIGRAM(S): 80 TABLET, FILM COATED ORAL at 22:22

## 2020-07-01 RX ADMIN — Medication 1 TABLET(S): at 12:05

## 2020-07-01 RX ADMIN — PANTOPRAZOLE SODIUM 40 MILLIGRAM(S): 20 TABLET, DELAYED RELEASE ORAL at 06:58

## 2020-07-01 RX ADMIN — Medication 1 MILLIGRAM(S): at 18:03

## 2020-07-01 RX ADMIN — ENOXAPARIN SODIUM 40 MILLIGRAM(S): 100 INJECTION SUBCUTANEOUS at 12:05

## 2020-07-01 RX ADMIN — Medication 1 MILLIGRAM(S): at 06:21

## 2020-07-01 RX ADMIN — Medication 100 MILLIGRAM(S): at 12:04

## 2020-07-01 RX ADMIN — Medication 3 MILLIGRAM(S): at 22:22

## 2020-07-01 RX ADMIN — Medication 0.5 MILLIGRAM(S): at 22:23

## 2020-07-01 RX ADMIN — Medication 650 MILLIGRAM(S): at 01:06

## 2020-07-01 RX ADMIN — Medication 1 MILLIGRAM(S): at 12:05

## 2020-07-01 RX ADMIN — Medication 1 MILLIGRAM(S): at 11:59

## 2020-07-01 RX ADMIN — Medication 1 MILLIGRAM(S): at 15:17

## 2020-07-02 PROCEDURE — 99233 SBSQ HOSP IP/OBS HIGH 50: CPT

## 2020-07-02 RX ORDER — ACETAMINOPHEN 500 MG
650 TABLET ORAL EVERY 6 HOURS
Refills: 0 | Status: DISCONTINUED | OUTPATIENT
Start: 2020-07-02 | End: 2020-07-03

## 2020-07-02 RX ADMIN — FAMOTIDINE 20 MILLIGRAM(S): 10 INJECTION INTRAVENOUS at 05:53

## 2020-07-02 RX ADMIN — CHLORHEXIDINE GLUCONATE 1 APPLICATION(S): 213 SOLUTION TOPICAL at 11:11

## 2020-07-02 RX ADMIN — Medication 325 MILLIGRAM(S): at 15:58

## 2020-07-02 RX ADMIN — PANTOPRAZOLE SODIUM 40 MILLIGRAM(S): 20 TABLET, DELAYED RELEASE ORAL at 05:53

## 2020-07-02 RX ADMIN — Medication 0.5 MILLIGRAM(S): at 17:38

## 2020-07-02 RX ADMIN — Medication 1 TABLET(S): at 11:16

## 2020-07-02 RX ADMIN — ONDANSETRON 4 MILLIGRAM(S): 8 TABLET, FILM COATED ORAL at 11:24

## 2020-07-02 RX ADMIN — Medication 3 MILLIGRAM(S): at 22:30

## 2020-07-02 RX ADMIN — ENOXAPARIN SODIUM 40 MILLIGRAM(S): 100 INJECTION SUBCUTANEOUS at 11:16

## 2020-07-02 RX ADMIN — Medication 1 MILLIGRAM(S): at 11:16

## 2020-07-02 RX ADMIN — Medication 0.5 MILLIGRAM(S): at 06:54

## 2020-07-02 RX ADMIN — Medication 0.5 MILLIGRAM(S): at 14:36

## 2020-07-02 RX ADMIN — Medication 0.5 MILLIGRAM(S): at 03:16

## 2020-07-02 RX ADMIN — ATORVASTATIN CALCIUM 20 MILLIGRAM(S): 80 TABLET, FILM COATED ORAL at 22:30

## 2020-07-02 RX ADMIN — Medication 100 MILLIGRAM(S): at 11:16

## 2020-07-02 RX ADMIN — FAMOTIDINE 20 MILLIGRAM(S): 10 INJECTION INTRAVENOUS at 17:38

## 2020-07-02 RX ADMIN — Medication 0.5 MILLIGRAM(S): at 11:15

## 2020-07-03 ENCOUNTER — TRANSCRIPTION ENCOUNTER (OUTPATIENT)
Age: 55
End: 2020-07-03

## 2020-07-03 VITALS
TEMPERATURE: 99 F | OXYGEN SATURATION: 98 % | DIASTOLIC BLOOD PRESSURE: 77 MMHG | RESPIRATION RATE: 18 BRPM | SYSTOLIC BLOOD PRESSURE: 120 MMHG | HEART RATE: 81 BPM

## 2020-07-03 PROCEDURE — 99239 HOSP IP/OBS DSCHRG MGMT >30: CPT

## 2020-07-03 RX ORDER — LANOLIN ALCOHOL/MO/W.PET/CERES
1 CREAM (GRAM) TOPICAL
Qty: 0 | Refills: 0 | DISCHARGE
Start: 2020-07-03

## 2020-07-03 RX ADMIN — FAMOTIDINE 20 MILLIGRAM(S): 10 INJECTION INTRAVENOUS at 17:01

## 2020-07-03 RX ADMIN — Medication 0.5 MILLIGRAM(S): at 16:58

## 2020-07-03 RX ADMIN — ENOXAPARIN SODIUM 40 MILLIGRAM(S): 100 INJECTION SUBCUTANEOUS at 11:09

## 2020-07-03 RX ADMIN — Medication 1 MILLIGRAM(S): at 11:09

## 2020-07-03 RX ADMIN — PANTOPRAZOLE SODIUM 40 MILLIGRAM(S): 20 TABLET, DELAYED RELEASE ORAL at 05:36

## 2020-07-03 RX ADMIN — FAMOTIDINE 20 MILLIGRAM(S): 10 INJECTION INTRAVENOUS at 05:37

## 2020-07-03 RX ADMIN — Medication 0.5 MILLIGRAM(S): at 05:36

## 2020-07-03 RX ADMIN — Medication 100 MILLIGRAM(S): at 11:09

## 2020-07-03 RX ADMIN — Medication 1 TABLET(S): at 11:09

## 2020-07-03 NOTE — DISCHARGE NOTE PROVIDER - HOSPITAL COURSE
56 y/o male w/ ETOH abuse w/frequent admissions, GERD, HLD, alcoholic gastritis/esophagitis, PUD, hx of hypoxic respiratory failure requiring intubation due to aspiration PNA comes w/abdominal pain was last admitted 3 wks ago for similar.  Reports pain mostly in epigastric area, worse w/eating for last 3-4 days w/decreased po intake and mostly just drinking, no fever, chills, sob, cp, palpitations, +n/+v/-d/-c no travel or sick contacts.        ETOH intoxication:    - ETOH level 450    - s/p Ativan per CIWA     - Seizure precautions    - MVI/folate/thiamine    - Encouraged cessation.         Chronic alcoholic gastritis:    - Continue PPI        HLD:    - Continue  statin.             Fatty liver:    - LFTs stable        Stable for d/c to alcohol rehab to local [facility] abuse treatment programs

## 2020-07-03 NOTE — PROGRESS NOTE ADULT - SUBJECTIVE AND OBJECTIVE BOX
Patient is a 55y old  Male who presents with a chief complaint of abdominal pain (02 Jul 2020 13:15)      OVERNIGHT EVENTS: none    REVIEW OF SYSTEMS: denies chest pain/SOB, diaphoresis, no F/C, cough, dizziness, headache, blurry vision, nausea, vomiting, abdominal pain. All others review of systems negative     MEDICATIONS  (STANDING):  atorvastatin 20 milliGRAM(s) Oral at bedtime  chlorhexidine 4% Liquid 1 Application(s) Topical daily  enoxaparin Injectable 40 milliGRAM(s) SubCutaneous daily  famotidine Injectable 20 milliGRAM(s) IV Push two times a day  folic acid 1 milliGRAM(s) Oral daily  LORazepam   Injectable 0.5 milliGRAM(s) IV Push every 12 hours  LORazepam   Injectable   IV Push   melatonin 3 milliGRAM(s) Oral at bedtime  multivitamin 1 Tablet(s) Oral daily  pantoprazole    Tablet 40 milliGRAM(s) Oral before breakfast  thiamine 100 milliGRAM(s) Oral daily    MEDICATIONS  (PRN):  acetaminophen   Tablet .. 650 milliGRAM(s) Oral every 8 hours PRN Mild Pain (1 - 3)  acetaminophen   Tablet .. 650 milliGRAM(s) Oral every 6 hours PRN Mild Pain (1 - 3)  LORazepam   Injectable 2 milliGRAM(s) IV Push every 1 hour PRN CIWA-Ar score 8 or greater  ondansetron Injectable 4 milliGRAM(s) IV Push four times a day PRN Nausea and/or Vomiting      Allergies    No Known Allergies    Intolerances        T(F): 98.7 (07-03-20 @ 10:41), Max: 98.7 (07-03-20 @ 10:41)  HR: 81 (07-03-20 @ 10:41) (63 - 83)  BP: 120/77 (07-03-20 @ 10:41) (117/76 - 131/84)  RR: 18 (07-03-20 @ 10:41) (16 - 18)  SpO2: 98% (07-03-20 @ 10:41) (98% - 99%)  Wt(kg): --    PHYSICAL EXAM:  GENERAL: NAD  HEAD:  Atraumatic, Normocephalic  EYES: PERRLA, conjunctiva and sclera clear  ENMT: Moist mucous membranes  NECK: Supple, No JVD, Normal thyroid  NERVOUS SYSTEM:  Alert & Awake  CHEST/LUNG: Clear to percussion bilaterally;   HEART: Regular rate and rhythm;   ABDOMEN: Soft, Nontender, Nondistended; Bowel sounds present  EXTREMITIES:  no edema BL LE  SKIN: moist    LABS:              Cultures;   CAPILLARY BLOOD GLUCOSE        Lipid panel:           RADIOLOGY & ADDITIONAL TESTS:    Imaging Personally Reviewed:  [x ] YES      Consultant(s) Notes Reviewed:  [x ] YES     Care Discussed with [x ] Consultants [X ] Patient [ ] Family  [x ]    [x ]  Other; RN
Patient is a 55y old  Male who presents with a chief complaint of abdominal pain (29 Jun 2020 00:52)    INTERVAL HPI/OVERNIGHT EVENTS:  Pt was seen and examined, no acute events.    MEDICATIONS  (STANDING):  atorvastatin 20 milliGRAM(s) Oral at bedtime  enoxaparin Injectable 40 milliGRAM(s) SubCutaneous daily  famotidine Injectable 20 milliGRAM(s) IV Push two times a day  folic acid 1 milliGRAM(s) Oral daily  lactated ringers. 1000 milliLiter(s) (100 mL/Hr) IV Continuous <Continuous>  LORazepam   Injectable 1.5 milliGRAM(s) IV Push every 4 hours  LORazepam   Injectable 1 milliGRAM(s) IV Push every 4 hours  LORazepam   Injectable   IV Push   multivitamin 1 Tablet(s) Oral daily  pantoprazole    Tablet 40 milliGRAM(s) Oral before breakfast  thiamine 100 milliGRAM(s) Oral daily    MEDICATIONS  (PRN):  acetaminophen   Tablet .. 650 milliGRAM(s) Oral every 8 hours PRN Mild Pain (1 - 3)  LORazepam   Injectable 2 milliGRAM(s) IV Push every 1 hour PRN CIWA-Ar score 8 or greater  ondansetron Injectable 4 milliGRAM(s) IV Push four times a day PRN Nausea and/or Vomiting      Allergies  No Known Allergies      Vital Signs Last 24 Hrs  T(C): 36.9 (30 Jun 2020 18:39), Max: 37.3 (29 Jun 2020 20:59)  T(F): 98.4 (30 Jun 2020 18:39), Max: 99.1 (29 Jun 2020 20:59)  HR: 71 (30 Jun 2020 18:39) (57 - 92)  BP: 138/94 (30 Jun 2020 18:39) (119/86 - 141/92)  BP(mean): --  RR: 17 (30 Jun 2020 18:39) (17 - 19)  SpO2: 98% (30 Jun 2020 18:39) (97% - 100%)    PHYSICAL EXAM:  GENERAL: NAD   HEAD:  Atraumatic  EYES: PERRLA  NERVOUS SYSTEM:  Awake, alert  CHEST/LUNG: Clear  HEART: RRR  ABDOMEN: Soft, non tender  EXTREMITIES:  no edema      LABS:                        10.0   2.07  )-----------( 100      ( 30 Jun 2020 09:13 )             30.5     06-30    138  |  104  |  9   ----------------------------<  86  3.6   |  27  |  0.89    Ca    8.8      30 Jun 2020 09:13  Mg     2.2     06-29    TPro  6.3  /  Alb  3.1<L>  /  TBili  0.6  /  DBili  x   /  AST  87<H>  /  ALT  61  /  AlkPhos  50  06-30    PT/INR - ( 28 Jun 2020 21:45 )   PT: 9.5 sec;   INR: 0.86 ratio         PTT - ( 28 Jun 2020 21:45 )  PTT:22.3 sec    CAPILLARY BLOOD GLUCOSE          RADIOLOGY & ADDITIONAL TESTS:    Imaging Personally Reviewed:  [ ] YES  [ ] NO    Consultant(s) Notes Reviewed:  [ ] YES  [ ] NO    Care Discussed with Consultants/Other Providers [ ] YES  [ ] NO
Patient is a 55y old  Male who presents with a chief complaint of abdominal pain (30 Jun 2020 20:14)      OVERNIGHT EVENTS: ciwa 12    REVIEW OF SYSTEMS: denies chest pain/SOB, diaphoresis, no F/C, cough, dizziness, headache, blurry vision, nausea, vomiting, abdominal pain. All others review of systems negative     MEDICATIONS  (STANDING):  atorvastatin 20 milliGRAM(s) Oral at bedtime  chlorhexidine 4% Liquid 1 Application(s) Topical daily  enoxaparin Injectable 40 milliGRAM(s) SubCutaneous daily  famotidine Injectable 20 milliGRAM(s) IV Push two times a day  folic acid 1 milliGRAM(s) Oral daily  LORazepam   Injectable 0.5 milliGRAM(s) IV Push every 4 hours  LORazepam   Injectable 1 milliGRAM(s) IV Push every 4 hours  LORazepam   Injectable   IV Push   melatonin 3 milliGRAM(s) Oral at bedtime  multivitamin 1 Tablet(s) Oral daily  pantoprazole    Tablet 40 milliGRAM(s) Oral before breakfast  thiamine 100 milliGRAM(s) Oral daily    MEDICATIONS  (PRN):  acetaminophen   Tablet .. 650 milliGRAM(s) Oral every 8 hours PRN Mild Pain (1 - 3)  LORazepam   Injectable 2 milliGRAM(s) IV Push every 1 hour PRN CIWA-Ar score 8 or greater  ondansetron Injectable 4 milliGRAM(s) IV Push four times a day PRN Nausea and/or Vomiting      Allergies    No Known Allergies    Intolerances        T(F): 98.9 (07-01-20 @ 11:00), Max: 98.9 (07-01-20 @ 11:00)  HR: 100 (07-01-20 @ 11:00) (62 - 100)  BP: 113/81 (07-01-20 @ 11:00) (113/81 - 142/86)  RR: 18 (07-01-20 @ 11:00) (17 - 18)  SpO2: 98% (07-01-20 @ 11:00) (97% - 100%)  Wt(kg): --    PHYSICAL EXAM:  GENERAL: NAD  HEAD:  Atraumatic, Normocephalic  EYES: PERRLA, conjunctiva and sclera clear  ENMT: Moist mucous membranes  NECK: Supple, No JVD, Normal thyroid  NERVOUS SYSTEM:  Alert & Awake  CHEST/LUNG: Clear to percussion bilaterally;   HEART: Regular rate and rhythm;   ABDOMEN: Soft, Nontender, Nondistended; Bowel sounds present  EXTREMITIES:  no edema BL LE  SKIN: moist    LABS:                        10.3   2.06  )-----------( 106      ( 01 Jul 2020 08:09 )             32.3     07-01    136  |  104  |  8   ----------------------------<  86  3.7   |  25  |  0.80    Ca    9.2      01 Jul 2020 08:09  Phos  3.9     07-01  Mg     2.0     07-01    TPro  7.4  /  Alb  3.3  /  TBili  0.7  /  DBili  x   /  AST  58<H>  /  ALT  58  /  AlkPhos  49  07-01        Cultures;   CAPILLARY BLOOD GLUCOSE        Lipid panel:           RADIOLOGY & ADDITIONAL TESTS:    Imaging Personally Reviewed:  [x ] YES      Consultant(s) Notes Reviewed:  [x ] YES     Care Discussed with [x ] Consultants [X ] Patient [ ] Family  [x ]    [x ]  Other; RN
Patient is a 55y old  Male who presents with a chief complaint of abdominal pain (01 Jul 2020 15:07)      OVERNIGHT EVENTS: none    REVIEW OF SYSTEMS: denies chest pain/SOB, diaphoresis, no F/C, cough, dizziness, headache, blurry vision, nausea, vomiting, abdominal pain. All others review of systems negative     MEDICATIONS  (STANDING):  atorvastatin 20 milliGRAM(s) Oral at bedtime  chlorhexidine 4% Liquid 1 Application(s) Topical daily  enoxaparin Injectable 40 milliGRAM(s) SubCutaneous daily  famotidine Injectable 20 milliGRAM(s) IV Push two times a day  folic acid 1 milliGRAM(s) Oral daily  LORazepam   Injectable 0.5 milliGRAM(s) IV Push every 4 hours  LORazepam   Injectable   IV Push   melatonin 3 milliGRAM(s) Oral at bedtime  multivitamin 1 Tablet(s) Oral daily  pantoprazole    Tablet 40 milliGRAM(s) Oral before breakfast  thiamine 100 milliGRAM(s) Oral daily    MEDICATIONS  (PRN):  acetaminophen   Tablet .. 650 milliGRAM(s) Oral every 8 hours PRN Mild Pain (1 - 3)  LORazepam   Injectable 2 milliGRAM(s) IV Push every 1 hour PRN CIWA-Ar score 8 or greater  ondansetron Injectable 4 milliGRAM(s) IV Push four times a day PRN Nausea and/or Vomiting      Allergies    No Known Allergies    Intolerances        T(F): 98.1 (07-02-20 @ 04:40), Max: 98.6 (07-01-20 @ 23:30)  HR: 83 (07-02-20 @ 11:22) (61 - 83)  BP: 122/87 (07-02-20 @ 11:22) (122/87 - 136/92)  RR: 16 (07-02-20 @ 11:22) (16 - 18)  SpO2: 96% (07-02-20 @ 11:22) (96% - 100%)  Wt(kg): --    PHYSICAL EXAM:  GENERAL: NAD  HEAD:  Atraumatic, Normocephalic  EYES: PERRLA, conjunctiva and sclera clear  ENMT: Moist mucous membranes  NECK: Supple, No JVD, Normal thyroid  NERVOUS SYSTEM:  Alert & Awake  CHEST/LUNG: Clear to percussion bilaterally;   HEART: Regular rate and rhythm;   ABDOMEN: Soft, Nontender, Nondistended; Bowel sounds present  EXTREMITIES:  no edema BL LE  SKIN: moist    LABS:                        10.3   2.06  )-----------( 106      ( 01 Jul 2020 08:09 )             32.3     07-01    136  |  104  |  8   ----------------------------<  86  3.7   |  25  |  0.80    Ca    9.2      01 Jul 2020 08:09  Phos  3.9     07-01  Mg     2.0     07-01    TPro  7.4  /  Alb  3.3  /  TBili  0.7  /  DBili  x   /  AST  58<H>  /  ALT  58  /  AlkPhos  49  07-01        Cultures;   CAPILLARY BLOOD GLUCOSE        Lipid panel:           RADIOLOGY & ADDITIONAL TESTS:    Imaging Personally Reviewed:  [x ] YES      Consultant(s) Notes Reviewed:  [x ] YES     Care Discussed with [x ] Consultants [X ] Patient [ ] Family  [x ]    [x ]  Other; RN

## 2020-07-03 NOTE — DISCHARGE NOTE PROVIDER - NSDCCPCAREPLAN_GEN_ALL_CORE_FT
PRINCIPAL DISCHARGE DIAGNOSIS  Diagnosis: Alcohol withdrawal  Assessment and Plan of Treatment:       SECONDARY DISCHARGE DIAGNOSES  Diagnosis: Lactic acidosis  Assessment and Plan of Treatment:     Diagnosis: Thiamin deficiency  Assessment and Plan of Treatment:     Diagnosis: Chronic alcoholic gastritis  Assessment and Plan of Treatment:     Diagnosis: Dyslipidemia  Assessment and Plan of Treatment:     Diagnosis: Epigastric pain  Assessment and Plan of Treatment:

## 2020-07-03 NOTE — PROGRESS NOTE ADULT - ASSESSMENT
54 y/o male w/ ETOH abuse w/frequent admissions, GERD, HLD, alcoholic gastritis/esophagitis, PUD, hx of hypoxic respiratory failure requiring intubation due to aspiration PNA comes w/abdominal pain was last admitted 3 wks ago for similar.  Reports pain mostly in epigastric area, worse w/eating for last 3-4 days w/decreased po intake and mostly just drinking, no fever, chills, sob, cp, palpitations, +n/+v/-d/-c no travel or sick contacts.    ETOH intoxication:  - ETOH level 450  - On Ativan per CIWA   - Seizure precautions  - MVI/folate/thiamine  - Encouraged cessation.     Chronic alcoholic gastritis:  - Continue PPI    HLD:  - Continue  statin.       Fatty liver:  - LFTs stable      DVT ppx
56 y/o male w/ ETOH abuse w/frequent admissions, GERD, HLD, alcoholic gastritis/esophagitis, PUD, hx of hypoxic respiratory failure requiring intubation due to aspiration PNA comes w/abdominal pain was last admitted 3 wks ago for similar.  Reports pain mostly in epigastric area, worse w/eating for last 3-4 days w/decreased po intake and mostly just drinking, no fever, chills, sob, cp, palpitations, +n/+v/-d/-c no travel or sick contacts.    ETOH intoxication:  - ETOH level 450  - On Ativan per CIWA   - Seizure precautions  - MVI/folate/thiamine  - Encouraged cessation.     Chronic alcoholic gastritis:  - Continue PPI    HLD:  - Continue  statin.       Fatty liver:  - LFTs stable      DVT ppx
54 y/o male w/ ETOH abuse w/frequent admissions, GERD, HLD, alcoholic gastritis/esophagitis, PUD, hx of hypoxic respiratory failure requiring intubation due to aspiration PNA comes w/abdominal pain was last admitted 3 wks ago for similar.  Reports pain mostly in epigastric area, worse w/eating for last 3-4 days w/decreased po intake and mostly just drinking, no fever, chills, sob, cp, palpitations, +n/+v/-d/-c no travel or sick contacts.    ETOH intoxication:  - ETOH level 450  - On Ativan per CIWA   - Seizure precautions  - MVI/folate/thiamine  - Encouraged cessation.     Chronic alcoholic gastritis:  - Continue PPI    HLD:  - Continue  statin.       Fatty liver:  - LFTs stable      DVT ppx
56 y/o male w/ ETOH abuse w/frequent admissions, GERD, HLD, alcoholic gastritis/esophagitis, PUD, hx of hypoxic respiratory failure requiring intubation due to aspiration PNA comes w/abdominal pain was last admitted 3 wks ago for similar.  Reports pain mostly in epigastric area, worse w/eating for last 3-4 days w/decreased po intake and mostly just drinking, no fever, chills, sob, cp, palpitations, +n/+v/-d/-c no travel or sick contacts.    ETOH intoxication:  - ETOH level 450  - On Ativan per CIWA   - Seizure precautions  - MVI/folate/thiamine  - Encouraged cessation.     Chronic alcoholic gastritis:  - Continue PPI    HLD:  - Continue  statin.       Fatty liver:  - LFTs stable      DVT ppx

## 2020-07-03 NOTE — DISCHARGE NOTE PROVIDER - NSDCMRMEDTOKEN_GEN_ALL_CORE_FT
atorvastatin 20 mg oral tablet: 1 tab(s) orally once a day (at bedtime)  folic acid 1 mg oral tablet: 1 tab(s) orally once a day  melatonin 3 mg oral tablet: 1 tab(s) orally once a day (at bedtime)  Multiple Vitamins oral tablet: 1 tab(s) orally once a day  pantoprazole 40 mg oral delayed release tablet: 1 tab(s) orally 2 times a day   thiamine 100 mg oral tablet: 1 tab(s) orally once a day

## 2020-07-03 NOTE — DISCHARGE NOTE NURSING/CASE MANAGEMENT/SOCIAL WORK - PATIENT PORTAL LINK FT
You can access the FollowMyHealth Patient Portal offered by [facility] by registering at the following website: [email or web address]. By joining [employer]’s FollowMyHealth portal, you will also be able to view your health information using other applications (apps) compatible with our system.

## 2020-07-08 ENCOUNTER — INPATIENT (INPATIENT)
Facility: HOSPITAL | Age: 55
LOS: 3 days | Discharge: HOME | End: 2020-07-12
Attending: HOSPITALIST | Admitting: HOSPITALIST
Payer: MEDICAID

## 2020-07-08 VITALS
WEIGHT: 145.06 LBS | TEMPERATURE: 98 F | HEART RATE: 125 BPM | RESPIRATION RATE: 18 BRPM | SYSTOLIC BLOOD PRESSURE: 154 MMHG | OXYGEN SATURATION: 99 % | HEIGHT: 68 IN | DIASTOLIC BLOOD PRESSURE: 103 MMHG

## 2020-07-08 DIAGNOSIS — K29.20 ALCOHOLIC GASTRITIS WITHOUT BLEEDING: ICD-10-CM

## 2020-07-08 DIAGNOSIS — F10.20 ALCOHOL DEPENDENCE, UNCOMPLICATED: ICD-10-CM

## 2020-07-08 DIAGNOSIS — K21.9 GASTRO-ESOPHAGEAL REFLUX DISEASE WITHOUT ESOPHAGITIS: ICD-10-CM

## 2020-07-08 DIAGNOSIS — E78.2 MIXED HYPERLIPIDEMIA: ICD-10-CM

## 2020-07-08 DIAGNOSIS — E51.9 THIAMINE DEFICIENCY, UNSPECIFIED: ICD-10-CM

## 2020-07-08 DIAGNOSIS — K29.70 GASTRITIS, UNSPECIFIED, WITHOUT BLEEDING: ICD-10-CM

## 2020-07-08 DIAGNOSIS — K27.9 PEPTIC ULCER, SITE UNSPECIFIED, UNSPECIFIED AS ACUTE OR CHRONIC, WITHOUT HEMORRHAGE OR PERFORATION: ICD-10-CM

## 2020-07-08 DIAGNOSIS — K76.0 FATTY (CHANGE OF) LIVER, NOT ELSEWHERE CLASSIFIED: ICD-10-CM

## 2020-07-08 DIAGNOSIS — E87.2 ACIDOSIS: ICD-10-CM

## 2020-07-08 DIAGNOSIS — F10.230 ALCOHOL DEPENDENCE WITH WITHDRAWAL, UNCOMPLICATED: ICD-10-CM

## 2020-07-08 DIAGNOSIS — F10.229 ALCOHOL DEPENDENCE WITH INTOXICATION, UNSPECIFIED: ICD-10-CM

## 2020-07-08 DIAGNOSIS — Z79.899 OTHER LONG TERM (CURRENT) DRUG THERAPY: ICD-10-CM

## 2020-07-08 DIAGNOSIS — R10.9 UNSPECIFIED ABDOMINAL PAIN: ICD-10-CM
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
## 2020-07-08 LAB
ALBUMIN SERPL ELPH-MCNC: 4.1 G/DL — SIGNIFICANT CHANGE UP (ref 3.3–5)
ALP SERPL-CCNC: 42 U/L — SIGNIFICANT CHANGE UP (ref 40–120)
ALT FLD-CCNC: 105 U/L — HIGH (ref 12–78)
ANION GAP SERPL CALC-SCNC: 12 MMOL/L — SIGNIFICANT CHANGE UP (ref 5–17)
ANISOCYTOSIS BLD QL: SLIGHT — SIGNIFICANT CHANGE UP
APPEARANCE UR: CLEAR — SIGNIFICANT CHANGE UP
AST SERPL-CCNC: 87 U/L — HIGH (ref 15–37)
BACTERIA # UR AUTO: ABNORMAL
BASOPHILS # BLD AUTO: 0.06 K/UL — SIGNIFICANT CHANGE UP (ref 0–0.2)
BASOPHILS NFR BLD AUTO: 2 % — SIGNIFICANT CHANGE UP (ref 0–2)
BILIRUB SERPL-MCNC: 0.6 MG/DL — SIGNIFICANT CHANGE UP (ref 0.2–1.2)
BILIRUB UR-MCNC: NEGATIVE — SIGNIFICANT CHANGE UP
BUN SERPL-MCNC: 12 MG/DL — SIGNIFICANT CHANGE UP (ref 7–23)
CALCIUM SERPL-MCNC: 10.2 MG/DL — HIGH (ref 8.5–10.1)
CHLORIDE SERPL-SCNC: 107 MMOL/L — SIGNIFICANT CHANGE UP (ref 96–108)
CO2 SERPL-SCNC: 25 MMOL/L — SIGNIFICANT CHANGE UP (ref 22–31)
COLOR SPEC: YELLOW — SIGNIFICANT CHANGE UP
CREAT SERPL-MCNC: 0.92 MG/DL — SIGNIFICANT CHANGE UP (ref 0.5–1.3)
DIFF PNL FLD: ABNORMAL
EOSINOPHIL # BLD AUTO: 0 K/UL — SIGNIFICANT CHANGE UP (ref 0–0.5)
EOSINOPHIL NFR BLD AUTO: 0 % — SIGNIFICANT CHANGE UP (ref 0–6)
EPI CELLS # UR: SIGNIFICANT CHANGE UP
ETHANOL SERPL-MCNC: <10 MG/DL — SIGNIFICANT CHANGE UP (ref 0–10)
GLUCOSE SERPL-MCNC: 119 MG/DL — HIGH (ref 70–99)
GLUCOSE UR QL: NEGATIVE MG/DL — SIGNIFICANT CHANGE UP
HCT VFR BLD CALC: 34.7 % — LOW (ref 39–50)
HGB BLD-MCNC: 11.3 G/DL — LOW (ref 13–17)
HYALINE CASTS # UR AUTO: ABNORMAL /LPF
KETONES UR-MCNC: ABNORMAL
LEUKOCYTE ESTERASE UR-ACNC: NEGATIVE — SIGNIFICANT CHANGE UP
LIDOCAIN IGE QN: 330 U/L — SIGNIFICANT CHANGE UP (ref 73–393)
LYMPHOCYTES # BLD AUTO: 0.83 K/UL — LOW (ref 1–3.3)
LYMPHOCYTES # BLD AUTO: 30 % — SIGNIFICANT CHANGE UP (ref 13–44)
MANUAL SMEAR VERIFICATION: SIGNIFICANT CHANGE UP
MCHC RBC-ENTMCNC: 29.4 PG — SIGNIFICANT CHANGE UP (ref 27–34)
MCHC RBC-ENTMCNC: 32.6 GM/DL — SIGNIFICANT CHANGE UP (ref 32–36)
MCV RBC AUTO: 90.1 FL — SIGNIFICANT CHANGE UP (ref 80–100)
MICROCYTES BLD QL: SLIGHT — SIGNIFICANT CHANGE UP
MONOCYTES # BLD AUTO: 0.33 K/UL — SIGNIFICANT CHANGE UP (ref 0–0.9)
MONOCYTES NFR BLD AUTO: 12 % — SIGNIFICANT CHANGE UP (ref 2–14)
NEUTROPHILS # BLD AUTO: 1.55 K/UL — LOW (ref 1.8–7.4)
NEUTROPHILS NFR BLD AUTO: 56 % — SIGNIFICANT CHANGE UP (ref 43–77)
NITRITE UR-MCNC: NEGATIVE — SIGNIFICANT CHANGE UP
NRBC # BLD: 0 /100 — SIGNIFICANT CHANGE UP (ref 0–0)
NRBC # BLD: SIGNIFICANT CHANGE UP /100 WBCS (ref 0–0)
NT-PROBNP SERPL-SCNC: 80 PG/ML — SIGNIFICANT CHANGE UP (ref 0–125)
PH UR: 6 — SIGNIFICANT CHANGE UP (ref 5–8)
PLAT MORPH BLD: NORMAL — SIGNIFICANT CHANGE UP
PLATELET # BLD AUTO: 259 K/UL — SIGNIFICANT CHANGE UP (ref 150–400)
POTASSIUM SERPL-MCNC: 4 MMOL/L — SIGNIFICANT CHANGE UP (ref 3.5–5.3)
POTASSIUM SERPL-SCNC: 4 MMOL/L — SIGNIFICANT CHANGE UP (ref 3.5–5.3)
PROT SERPL-MCNC: 8.3 GM/DL — SIGNIFICANT CHANGE UP (ref 6–8.3)
PROT UR-MCNC: 30 MG/DL
RBC # BLD: 3.85 M/UL — LOW (ref 4.2–5.8)
RBC # FLD: 14.5 % — SIGNIFICANT CHANGE UP (ref 10.3–14.5)
RBC BLD AUTO: ABNORMAL
RBC CASTS # UR COMP ASSIST: SIGNIFICANT CHANGE UP /HPF (ref 0–4)
SODIUM SERPL-SCNC: 144 MMOL/L — SIGNIFICANT CHANGE UP (ref 135–145)
SP GR SPEC: 1.02 — SIGNIFICANT CHANGE UP (ref 1.01–1.02)
STOMATOCYTES BLD QL SMEAR: SLIGHT — SIGNIFICANT CHANGE UP
TROPONIN I SERPL-MCNC: <.015 NG/ML — SIGNIFICANT CHANGE UP (ref 0.01–0.04)
UROBILINOGEN FLD QL: NEGATIVE MG/DL — SIGNIFICANT CHANGE UP
WBC # BLD: 2.77 K/UL — LOW (ref 3.8–10.5)
WBC # FLD AUTO: 2.77 K/UL — LOW (ref 3.8–10.5)

## 2020-07-08 PROCEDURE — 36410 VNPNXR 3YR/> PHY/QHP DX/THER: CPT

## 2020-07-08 PROCEDURE — 99223 1ST HOSP IP/OBS HIGH 75: CPT

## 2020-07-08 PROCEDURE — 71045 X-RAY EXAM CHEST 1 VIEW: CPT | Mod: 26

## 2020-07-08 PROCEDURE — 76937 US GUIDE VASCULAR ACCESS: CPT | Mod: 26

## 2020-07-08 PROCEDURE — 93010 ELECTROCARDIOGRAM REPORT: CPT

## 2020-07-08 PROCEDURE — 99285 EMERGENCY DEPT VISIT HI MDM: CPT

## 2020-07-08 RX ORDER — FOLIC ACID 0.8 MG
1 TABLET ORAL DAILY
Refills: 0 | Status: DISCONTINUED | OUTPATIENT
Start: 2020-07-08 | End: 2020-07-12

## 2020-07-08 RX ORDER — ONDANSETRON 8 MG/1
4 TABLET, FILM COATED ORAL ONCE
Refills: 0 | Status: COMPLETED | OUTPATIENT
Start: 2020-07-08 | End: 2020-07-08

## 2020-07-08 RX ORDER — ONDANSETRON 8 MG/1
8 TABLET, FILM COATED ORAL EVERY 6 HOURS
Refills: 0 | Status: DISCONTINUED | OUTPATIENT
Start: 2020-07-08 | End: 2020-07-12

## 2020-07-08 RX ORDER — THIAMINE MONONITRATE (VIT B1) 100 MG
100 TABLET ORAL DAILY
Refills: 0 | Status: DISCONTINUED | OUTPATIENT
Start: 2020-07-08 | End: 2020-07-12

## 2020-07-08 RX ORDER — KETOROLAC TROMETHAMINE 30 MG/ML
15 SYRINGE (ML) INJECTION ONCE
Refills: 0 | Status: DISCONTINUED | OUTPATIENT
Start: 2020-07-08 | End: 2020-07-08

## 2020-07-08 RX ORDER — SODIUM CHLORIDE 9 MG/ML
1000 INJECTION INTRAMUSCULAR; INTRAVENOUS; SUBCUTANEOUS
Refills: 0 | Status: DISCONTINUED | OUTPATIENT
Start: 2020-07-08 | End: 2020-07-10

## 2020-07-08 RX ORDER — ACETAMINOPHEN 500 MG
650 TABLET ORAL EVERY 4 HOURS
Refills: 0 | Status: DISCONTINUED | OUTPATIENT
Start: 2020-07-08 | End: 2020-07-08

## 2020-07-08 RX ORDER — PANTOPRAZOLE SODIUM 20 MG/1
40 TABLET, DELAYED RELEASE ORAL
Refills: 0 | Status: DISCONTINUED | OUTPATIENT
Start: 2020-07-08 | End: 2020-07-12

## 2020-07-08 RX ORDER — LIDOCAINE 4 G/100G
10 CREAM TOPICAL ONCE
Refills: 0 | Status: COMPLETED | OUTPATIENT
Start: 2020-07-08 | End: 2020-07-08

## 2020-07-08 RX ORDER — FAMOTIDINE 10 MG/ML
20 INJECTION INTRAVENOUS ONCE
Refills: 0 | Status: COMPLETED | OUTPATIENT
Start: 2020-07-08 | End: 2020-07-08

## 2020-07-08 RX ORDER — SODIUM CHLORIDE 9 MG/ML
1000 INJECTION INTRAMUSCULAR; INTRAVENOUS; SUBCUTANEOUS ONCE
Refills: 0 | Status: COMPLETED | OUTPATIENT
Start: 2020-07-08 | End: 2020-07-08

## 2020-07-08 RX ADMIN — Medication 15 MILLIGRAM(S): at 22:52

## 2020-07-08 RX ADMIN — SODIUM CHLORIDE 1000 MILLILITER(S): 9 INJECTION INTRAMUSCULAR; INTRAVENOUS; SUBCUTANEOUS at 12:35

## 2020-07-08 RX ADMIN — ONDANSETRON 4 MILLIGRAM(S): 8 TABLET, FILM COATED ORAL at 12:35

## 2020-07-08 RX ADMIN — LIDOCAINE 10 MILLILITER(S): 4 CREAM TOPICAL at 12:35

## 2020-07-08 RX ADMIN — FAMOTIDINE 20 MILLIGRAM(S): 10 INJECTION INTRAVENOUS at 12:35

## 2020-07-08 RX ADMIN — Medication 1 MILLIGRAM(S): at 12:38

## 2020-07-08 RX ADMIN — Medication 30 MILLILITER(S): at 15:30

## 2020-07-08 RX ADMIN — Medication 30 MILLILITER(S): at 12:35

## 2020-07-08 NOTE — ED PROVIDER NOTE - OBJECTIVE STATEMENT
56 y/o M Hx of HLD, Gastritis, Alcohol Abuse, presents with complains of vomiting and abdominal pain. Last drink was four days ago and is currently feeling shaky. No fevers, cp, cough, or sob. Has had some vomiting no noted blood in vomit. Pt reports that symptoms feel similar to the last episode in the past.

## 2020-07-08 NOTE — H&P ADULT - NSHPPHYSICALEXAM_GEN_ALL_CORE
T(F): 98.3 (07-08-20 @ 11:45), Max: 98.3 (07-08-20 @ 11:45)  HR: 89 (07-08-20 @ 14:16) (89 - 125)  BP: 154/103 (07-08-20 @ 11:45) (154/103 - 154/103)  RR: 18 (07-08-20 @ 11:45) (18 - 18)  SpO2: 99% (07-08-20 @ 11:45) (99% - 99%)    PHYSICAL EXAM:  General: NAD, WDWN.   HEENT: +tongue fasiculations  CV: +S1+S2 regular rate and rhythm  Lung: clear to ausculation bilaterally, respirations nonlabored, good inspiratory effort  Abdomen: soft, NTND. Normactive BS  Extremities: no pedal edema or calf tenderness noted. +b/l UE tremors T(F): 98.3 (07-08-20 @ 11:45), Max: 98.3 (07-08-20 @ 11:45)  HR: 89 (07-08-20 @ 14:16) (89 - 125)  BP: 154/103 (07-08-20 @ 11:45) (154/103 - 154/103)  RR: 18 (07-08-20 @ 11:45) (18 - 18)  SpO2: 99% (07-08-20 @ 11:45) (99% - 99%)    PHYSICAL EXAM:  General: NAD, WDWN.   HEENT:  at/  nc  CV: +S1+S2 regular rate and rhythm  Lung: clear to ausculation bilaterally, respirations nonlabored, good inspiratory effort  Abdomen: soft, NTND. Normactive BS  Extremities: no pedal edema or calf tenderness noted.   +b/l UE tremors, on  arm  extension

## 2020-07-08 NOTE — H&P ADULT - NSHPREVIEWOFSYSTEMS_GEN_ALL_CORE
REVIEW OF SYSTEMS:  GEN: no fever,    no chills  RESP: no SOB,   no cough  CVS: no chest pain,   no palpitations  GI:   s/o  abdominal pain,   no nausea,   no vomiting,   no constipation,   no diarrhea  : no dysuria,   no frequency  NEURO: no headache,   no dizziness  PSYCH: no depression,   not anxious  Derm : no rash

## 2020-07-08 NOTE — ED PROVIDER NOTE - CLINICAL SUMMARY MEDICAL DECISION MAKING FREE TEXT BOX
ddx: alcohol withdrawal, alcoholic gastritis, pancreatitis. no abdominal tenderness or guarding to suggest surgical abdomen.   plan: cbc, cmp, lipase, fluids, GI cocktail, ativan, likely admission.

## 2020-07-08 NOTE — H&P ADULT - HISTORY OF PRESENT ILLNESS
56 y/o M w/ PMHX of Gastritis, Alcohol Abuse now presenting w/ shakes, nausea and vomiting x today.  Patient has been admitted on multiple occasions for alcohol withdrawal and gastritis.  Patient states his last drink was this past weekend for 4th of July celebration, patient states he drank 1 bottle of vodka.  Patient now is tremulous w/ nausea and vomiting and intermittent abdominal pain.  No fever, chills, chest pain, SOB. 56 y/o M   w/ PMHX of Gastritis, Alcohol Abuse   now presenting w/ shakes, nausea and vomiting x today.    Patient has been admitted on multiple occasions for alcohol withdrawal    Patient states his last drink was this past weekend for 4th of July celebration, patient states he drank 1 bottle of vodka.    Patient now is tremulous w/ nausea and vomiting and intermittent abdominal pain..  all of which has   since  resolved    No fever, chills, chest pain, SOB.   on chart  review, it appears  that  pt  come s to  this  hospitals  about  6  to  7 times. every  month, for  the  same  problem

## 2020-07-08 NOTE — ED ADULT NURSE REASSESSMENT NOTE - NS ED NURSE REASSESS COMMENT FT1
3rd attempt to give 2 C repot, as per  nurse scott with another patient. Room is not ready waiting to be cleaned.

## 2020-07-08 NOTE — H&P ADULT - NSHPLABSRESULTS_GEN_ALL_CORE
LABS:                        11.3   2.77  )-----------( 259      ( 08 Jul 2020 12:39 )             34.7     07-08    144  |  107  |  12  ----------------------------<  119<H>  4.0   |  25  |  0.92    Ca    10.2<H>      08 Jul 2020 12:39    TPro  8.3  /  Alb  4.1  /  TBili  0.6  /  DBili  x   /  AST  87<H>  /  ALT  105<H>  /  AlkPhos  42  07-08

## 2020-07-08 NOTE — ED ADULT TRIAGE NOTE - CHIEF COMPLAINT QUOTE
generalize body pain with burning with nausea and vomiting for 3 days. Tremors noted at triage, states he last drank 4 days ago. pt denies chest pain at triage

## 2020-07-08 NOTE — H&P ADULT - ASSESSMENT
54 y/o M w/ PMHX of Gastritis, Alcohol Abuse now presenting w/ shakes, nausea and vomiting x today.  Patient has been admitted on multiple occasions for alcohol withdrawal and gastritis.  Patient states his last drink was this past weekend for 4th of July celebration, patient states he drank 1 bottle of vodka.  Patient now is tremulous w/ nausea and vomiting and intermittent abdominal pain.  No fever, chills, chest pain, SOB. 56 y/o M   w/ PMHX of Gastritis, Alcohol Abuse/  h/o  DT'S.  ,  elevated   LFT'S.   c/c  low  wbc     now presenting w/ shakes, nausea and vomiting x today.    Patient has been admitted on multiple occasions for alcohol withdrawal /  pt  in this hospital  about  6  to  7 times,  every  month for  ETOH  abuse,  and  is  very  well  known  to  [facility]  staff    Patient states his last drink was this past weekend for 4th of July celebration, patient states he drank 1 bottle of vodka.   Patient now is tremulous .  s/p   nausea and vomiting and  abdominal pain./  none  at  present  No fever, chills, chest pain, SOB.       h/o  ETOH  abuse,  now  with  tremors/  DT  elevated    LFT'S  from Alcoholic  hepatitis,  and   mild  hypercalcemia,  noted  DT's, ,  on   CIWA   protocol  iv  fluids/  iv  thiamine  recent   cxr,  was  normal  and    abd  u/s,   on  6/29/20,  steatosis   c/c   leukopenia,  from  Etoh abuse    v  fluids/  iv  thiamine  recent   cxr  and  abd  u/s,  steatosis   c/c   leukopenia,  from  Etoh abuse   follow  bp curve  on dvt ppx/  s/q  heparin    pt  counselled   on refraining from  alcohol       < from: US Abdomen Complete (06.29.20 @ 09:42) >  IMPRESSION:   Fatty infiltration of the liver.  Pancreas is poorly visualized due to overlying bowel gas.  < end of copied text >

## 2020-07-08 NOTE — H&P ADULT - PROBLEM SELECTOR PLAN 1
- Admit to Dr. Cronin's service, hospitalist service  - Ativan protocol  - IV fluids  - NPO except meds  - Zofran IV PRN  - Tylenol PRN

## 2020-07-09 LAB
ANION GAP SERPL CALC-SCNC: 8 MMOL/L — SIGNIFICANT CHANGE UP (ref 5–17)
BUN SERPL-MCNC: 12 MG/DL — SIGNIFICANT CHANGE UP (ref 7–23)
CALCIUM SERPL-MCNC: 8.4 MG/DL — LOW (ref 8.5–10.1)
CHLORIDE SERPL-SCNC: 108 MMOL/L — SIGNIFICANT CHANGE UP (ref 96–108)
CO2 SERPL-SCNC: 26 MMOL/L — SIGNIFICANT CHANGE UP (ref 22–31)
CREAT SERPL-MCNC: 0.74 MG/DL — SIGNIFICANT CHANGE UP (ref 0.5–1.3)
CULTURE RESULTS: NO GROWTH — SIGNIFICANT CHANGE UP
GLUCOSE SERPL-MCNC: 82 MG/DL — SIGNIFICANT CHANGE UP (ref 70–99)
HCT VFR BLD CALC: 32.4 % — LOW (ref 39–50)
HGB BLD-MCNC: 10.2 G/DL — LOW (ref 13–17)
MCHC RBC-ENTMCNC: 29 PG — SIGNIFICANT CHANGE UP (ref 27–34)
MCHC RBC-ENTMCNC: 31.5 GM/DL — LOW (ref 32–36)
MCV RBC AUTO: 92 FL — SIGNIFICANT CHANGE UP (ref 80–100)
NRBC # BLD: 0 /100 WBCS — SIGNIFICANT CHANGE UP (ref 0–0)
PLATELET # BLD AUTO: 219 K/UL — SIGNIFICANT CHANGE UP (ref 150–400)
POTASSIUM SERPL-MCNC: 3.5 MMOL/L — SIGNIFICANT CHANGE UP (ref 3.5–5.3)
POTASSIUM SERPL-SCNC: 3.5 MMOL/L — SIGNIFICANT CHANGE UP (ref 3.5–5.3)
RBC # BLD: 3.52 M/UL — LOW (ref 4.2–5.8)
RBC # FLD: 14.4 % — SIGNIFICANT CHANGE UP (ref 10.3–14.5)
SARS-COV-2 IGG SERPL QL IA: NEGATIVE — SIGNIFICANT CHANGE UP
SARS-COV-2 IGM SERPL IA-ACNC: <0.1 INDEX — SIGNIFICANT CHANGE UP
SARS-COV-2 RNA SPEC QL NAA+PROBE: SIGNIFICANT CHANGE UP
SODIUM SERPL-SCNC: 142 MMOL/L — SIGNIFICANT CHANGE UP (ref 135–145)
SPECIMEN SOURCE: SIGNIFICANT CHANGE UP
WBC # BLD: 2.36 K/UL — LOW (ref 3.8–10.5)
WBC # FLD AUTO: 2.36 K/UL — LOW (ref 3.8–10.5)

## 2020-07-09 PROCEDURE — 99233 SBSQ HOSP IP/OBS HIGH 50: CPT

## 2020-07-09 RX ORDER — IBUPROFEN 200 MG
200 TABLET ORAL EVERY 6 HOURS
Refills: 0 | Status: DISCONTINUED | OUTPATIENT
Start: 2020-07-09 | End: 2020-07-12

## 2020-07-09 RX ORDER — ACETAMINOPHEN 500 MG
650 TABLET ORAL EVERY 6 HOURS
Refills: 0 | Status: DISCONTINUED | OUTPATIENT
Start: 2020-07-09 | End: 2020-07-09

## 2020-07-09 RX ORDER — PHENOBARBITAL 60 MG
32.4 TABLET ORAL THREE TIMES A DAY
Refills: 0 | Status: DISCONTINUED | OUTPATIENT
Start: 2020-07-10 | End: 2020-07-11

## 2020-07-09 RX ORDER — PHENOBARBITAL 60 MG
130 TABLET ORAL ONCE
Refills: 0 | Status: DISCONTINUED | OUTPATIENT
Start: 2020-07-09 | End: 2020-07-09

## 2020-07-09 RX ORDER — OXYCODONE HYDROCHLORIDE 5 MG/1
5 TABLET ORAL ONCE
Refills: 0 | Status: DISCONTINUED | OUTPATIENT
Start: 2020-07-09 | End: 2020-07-10

## 2020-07-09 RX ADMIN — Medication 2 MILLIGRAM(S): at 01:09

## 2020-07-09 RX ADMIN — Medication 1 TABLET(S): at 11:13

## 2020-07-09 RX ADMIN — SODIUM CHLORIDE 100 MILLILITER(S): 9 INJECTION INTRAMUSCULAR; INTRAVENOUS; SUBCUTANEOUS at 21:50

## 2020-07-09 RX ADMIN — Medication 200 MILLIGRAM(S): at 15:27

## 2020-07-09 RX ADMIN — Medication 130 MILLIGRAM(S): at 11:13

## 2020-07-09 RX ADMIN — Medication 100 MILLIGRAM(S): at 15:24

## 2020-07-09 RX ADMIN — Medication 200 MILLIGRAM(S): at 21:56

## 2020-07-09 RX ADMIN — PANTOPRAZOLE SODIUM 40 MILLIGRAM(S): 20 TABLET, DELAYED RELEASE ORAL at 06:09

## 2020-07-09 RX ADMIN — Medication 1 MILLIGRAM(S): at 11:13

## 2020-07-09 NOTE — PROGRESS NOTE ADULT - SUBJECTIVE AND OBJECTIVE BOX
CHIEF COMPLAINT: shakiness arms.   + pain back   no fever   no vomiting  at high risk for DTs       PHYSICAL EXAM:    GENERAL: well built, well nourished  CHEST/LUNG: Clear to ausculation bilaterally, no wheezing, no crackles   HEART: Regular rate and rhythm; No murmurs, rubs  ABDOMEN: Soft, Nontender, Nondistended; Bowel sounds present  EXTREMITIES:  Moving all four extremities spontaneously, No clubbing, cyanosis, or edema. + shakiness arms at rest. 	  NERVOUS SYSTEM:  Grossly non focal.  Psychiatry: AAO x 3, mood is appropriate       OBJECTIVE DATA:   Vital Signs Last 24 Hrs  T(C): 37.8 (2020 12:36), Max: 37.8 (2020 12:36)  T(F): 100 (2020 12:36), Max: 100 (2020 12:36)  HR: 76 (2020 12:36) (56 - 89)  BP: 122/72 (2020 12:36) (122/72 - 145/90)  BP(mean): --  RR: 18 (2020 12:36) (17 - 18)  SpO2: 97% (2020 12:36) (97% - 100%)           Daily     Daily Weight in k (2020 04:50)  LABS:                        10.2   2.36  )-----------( 219      ( 2020 07:24 )             32.4             07-09    142  |  108  |  12  ----------------------------<  82  3.5   |  26  |  0.74    Ca    8.4<L>      2020 07:24    TPro  8.3  /  Alb  4.1  /  TBili  0.6  /  DBili  x   /  AST  87<H>  /  ALT  105<H>  /  AlkPhos  42  07-08                Urinalysis Basic - ( 2020 17:32 )    Color: Yellow / Appearance: Clear / S.020 / pH: x  Gluc: x / Ketone: Trace  / Bili: Negative / Urobili: Negative mg/dL   Blood: x / Protein: 30 mg/dL / Nitrite: Negative   Leuk Esterase: Negative / RBC: 0-2 /HPF / WBC x   Sq Epi: x / Non Sq Epi: Occasional / Bacteria: Few        CARDIAC MARKERS ( 2020 15:14 )  <.015 ng/mL / x     / x     / x     / x          Tele reviewed by me showed no significant arrhythmias since admission   	  MEDICATIONS  (STANDING):  folic acid 1 milliGRAM(s) Oral daily  multivitamin 1 Tablet(s) Oral daily  pantoprazole    Tablet 40 milliGRAM(s) Oral before breakfast  sodium chloride 0.9%. 1000 milliLiter(s) (100 mL/Hr) IV Continuous <Continuous>  thiamine Injectable 100 milliGRAM(s) IV Push daily    MEDICATIONS  (PRN):  ibuprofen  Tablet. 200 milliGRAM(s) Oral every 6 hours PRN Temp greater or equal to 38C (100.4F), Mild Pain (1 - 3)  LORazepam   Injectable 2 milliGRAM(s) IV Push every 2 hours PRN Symptom-triggered: each CIWA -Ar score 8 or GREATER  ondansetron Injectable 8 milliGRAM(s) IV Push every 6 hours PRN Nausea and/or Vomiting

## 2020-07-10 PROCEDURE — 99233 SBSQ HOSP IP/OBS HIGH 50: CPT

## 2020-07-10 RX ORDER — OXYCODONE AND ACETAMINOPHEN 5; 325 MG/1; MG/1
2 TABLET ORAL EVERY 4 HOURS
Refills: 0 | Status: DISCONTINUED | OUTPATIENT
Start: 2020-07-10 | End: 2020-07-12

## 2020-07-10 RX ADMIN — OXYCODONE HYDROCHLORIDE 5 MILLIGRAM(S): 5 TABLET ORAL at 00:00

## 2020-07-10 RX ADMIN — Medication 32.4 MILLIGRAM(S): at 13:07

## 2020-07-10 RX ADMIN — Medication 32.4 MILLIGRAM(S): at 05:27

## 2020-07-10 RX ADMIN — Medication 200 MILLIGRAM(S): at 08:17

## 2020-07-10 RX ADMIN — OXYCODONE AND ACETAMINOPHEN 2 TABLET(S): 5; 325 TABLET ORAL at 20:41

## 2020-07-10 RX ADMIN — PANTOPRAZOLE SODIUM 40 MILLIGRAM(S): 20 TABLET, DELAYED RELEASE ORAL at 05:27

## 2020-07-10 RX ADMIN — Medication 1 MILLIGRAM(S): at 13:07

## 2020-07-10 RX ADMIN — Medication 100 MILLIGRAM(S): at 13:07

## 2020-07-10 RX ADMIN — Medication 1 TABLET(S): at 13:07

## 2020-07-10 RX ADMIN — Medication 32.4 MILLIGRAM(S): at 22:23

## 2020-07-10 RX ADMIN — OXYCODONE AND ACETAMINOPHEN 2 TABLET(S): 5; 325 TABLET ORAL at 15:39

## 2020-07-10 NOTE — PROGRESS NOTE ADULT - SUBJECTIVE AND OBJECTIVE BOX
Patient is a 55y old  Male who presents with a chief complaint of Alcohol Withdrawal (2020 13:29)      INTERVAL HPI/OVERNIGHT EVENTS: no acute events overnight     MEDICATIONS  (STANDING):  folic acid 1 milliGRAM(s) Oral daily  multivitamin 1 Tablet(s) Oral daily  pantoprazole    Tablet 40 milliGRAM(s) Oral before breakfast  PHENobarbital 32.4 milliGRAM(s) Oral three times a day  sodium chloride 0.9%. 1000 milliLiter(s) (100 mL/Hr) IV Continuous <Continuous>  thiamine Injectable 100 milliGRAM(s) IV Push daily    MEDICATIONS  (PRN):  ibuprofen  Tablet. 200 milliGRAM(s) Oral every 6 hours PRN Temp greater or equal to 38C (100.4F), Mild Pain (1 - 3)  LORazepam   Injectable 2 milliGRAM(s) IV Push every 2 hours PRN Symptom-triggered: each CIWA -Ar score 8 or GREATER  ondansetron Injectable 8 milliGRAM(s) IV Push every 6 hours PRN Nausea and/or Vomiting      Allergies    No Known Allergies    Intolerances        REVIEW OF SYSTEMS:  CONSTITUTIONAL: No fever, weight loss, or fatigue  EYES: No eye pain, visual disturbances, or discharge  ENMT:  No difficulty hearing, tinnitus, vertigo; No sinus or throat pain  NECK: No pain or stiffness  BREASTS: No pain, masses, or nipple discharge  RESPIRATORY: No cough, wheezing, chills or hemoptysis; No shortness of breath  CARDIOVASCULAR: No chest pain, palpitations, dizziness, or leg swelling  GASTROINTESTINAL: No abdominal or epigastric pain. No nausea, vomiting, or hematemesis; No diarrhea or constipation. No melena or hematochezia.  GENITOURINARY: No dysuria, frequency, hematuria, or incontinence  NEUROLOGICAL: tremors  SKIN: No itching, burning, rashes, or lesions   LYMPH NODES: No enlarged glands  ENDOCRINE: No heat or cold intolerance; No hair loss  MUSCULOSKELETAL: back pain   PSYCHIATRIC: No depression, anxiety, mood swings, or difficulty sleeping  HEME/LYMPH: No easy bruising, or bleeding gums  ALLERGY AND IMMUNOLOGIC: No hives or eczema    Vital Signs Last 24 Hrs  T(C): 36.7 (2020 23:40), Max: 37.8 (2020 12:36)  T(F): 98.1 (2020 23:40), Max: 100 (2020 12:36)  HR: 49 (10 Jul 2020 07:01) (49 - 79)  BP: 150/84 (2020 23:40) (122/72 - 150/84)  BP(mean): --  RR: 18 (2020 23:40) (18 - 18)  SpO2: 97% (2020 23:40) (97% - 97%)    PHYSICAL EXAM:  GENERAL: NAD, well-groomed, well-developed  HEAD:  Atraumatic, Normocephalic  EYES: EOMI, PERRLA, conjunctiva and sclera clear  ENMT: No tonsillar erythema, exudates, or enlargement; Moist mucous membranes, Good dentition, No lesions  NECK: Supple, No JVD, Normal thyroid  NERVOUS SYSTEM:  Alert & Oriented X3, Good concentration; Motor Strength 5/5 B/L upper and lower extremities; DTRs 2+ intact and symmetric  CHEST/LUNG: Clear to percussion bilaterally; No rales, rhonchi, wheezing, or rubs  HEART: Regular rate and rhythm; No murmurs, rubs, or gallops  ABDOMEN: Soft, Nontender, Nondistended; Bowel sounds present  EXTREMITIES:  tremor   LYMPH: No lymphadenopathy noted  SKIN: No rashes or lesions    LABS:                        10.2   2.36  )-----------( 219      ( 2020 07:24 )             32.4     07-09    142  |  108  |  12  ----------------------------<  82  3.5   |  26  |  0.74    Ca    8.4<L>      2020 07:24    TPro  8.3  /  Alb  4.1  /  TBili  0.6  /  DBili  x   /  AST  87<H>  /  ALT  105<H>  /  AlkPhos  42  07-08      Urinalysis Basic - ( 2020 17:32 )    Color: Yellow / Appearance: Clear / S.020 / pH: x  Gluc: x / Ketone: Trace  / Bili: Negative / Urobili: Negative mg/dL   Blood: x / Protein: 30 mg/dL / Nitrite: Negative   Leuk Esterase: Negative / RBC: 0-2 /HPF / WBC x   Sq Epi: x / Non Sq Epi: Occasional / Bacteria: Few      CAPILLARY BLOOD GLUCOSE          RADIOLOGY & ADDITIONAL TESTS:    Imaging Personally Reviewed:  [X ] YES  [ ] NO    Consultant(s) Notes Reviewed:  [X ] YES  [ ] NO    Care Discussed with Consultants/Other Providers [ X] YES  [ ] NO

## 2020-07-10 NOTE — PROGRESS NOTE ADULT - ASSESSMENT
1.	Acute alcohol withdrawal in a patient with hx of chronic alcohol dependence and ongoing alcohol abuse. Multiple hospitalizations. at high risk for DTs and/or severe withdrawal. Given phenobarbitone x 1 and  now on scheduled oral phenobarbitone x 2 days with holding parameters. cont iv ativan prn. cont CIWA protocol. cont aspiration, seizure and fall precautions. follow labs. cont IVF. telemonitoring.   2.	chronic alcoholic hepatitis and leukopenia. advised quitting alcohol.   3.	Chronic low back pain. cont tylenol prn.     DVT Ppx: SCDs  Full Code.

## 2020-07-11 LAB
ALBUMIN SERPL ELPH-MCNC: 3.8 G/DL — SIGNIFICANT CHANGE UP (ref 3.3–5)
ALP SERPL-CCNC: 37 U/L — LOW (ref 40–120)
ALT FLD-CCNC: 73 U/L — SIGNIFICANT CHANGE UP (ref 12–78)
ANION GAP SERPL CALC-SCNC: 7 MMOL/L — SIGNIFICANT CHANGE UP (ref 5–17)
AST SERPL-CCNC: 44 U/L — HIGH (ref 15–37)
BILIRUB SERPL-MCNC: 0.4 MG/DL — SIGNIFICANT CHANGE UP (ref 0.2–1.2)
BUN SERPL-MCNC: 10 MG/DL — SIGNIFICANT CHANGE UP (ref 7–23)
CALCIUM SERPL-MCNC: 9 MG/DL — SIGNIFICANT CHANGE UP (ref 8.5–10.1)
CHLORIDE SERPL-SCNC: 106 MMOL/L — SIGNIFICANT CHANGE UP (ref 96–108)
CO2 SERPL-SCNC: 28 MMOL/L — SIGNIFICANT CHANGE UP (ref 22–31)
CREAT SERPL-MCNC: 0.93 MG/DL — SIGNIFICANT CHANGE UP (ref 0.5–1.3)
GLUCOSE SERPL-MCNC: 95 MG/DL — SIGNIFICANT CHANGE UP (ref 70–99)
HCT VFR BLD CALC: 36.3 % — LOW (ref 39–50)
HGB BLD-MCNC: 11.4 G/DL — LOW (ref 13–17)
MAGNESIUM SERPL-MCNC: 1.9 MG/DL — SIGNIFICANT CHANGE UP (ref 1.6–2.6)
MCHC RBC-ENTMCNC: 29.4 PG — SIGNIFICANT CHANGE UP (ref 27–34)
MCHC RBC-ENTMCNC: 31.4 GM/DL — LOW (ref 32–36)
MCV RBC AUTO: 93.6 FL — SIGNIFICANT CHANGE UP (ref 80–100)
NRBC # BLD: 0 /100 WBCS — SIGNIFICANT CHANGE UP (ref 0–0)
PHOSPHATE SERPL-MCNC: 3 MG/DL — SIGNIFICANT CHANGE UP (ref 2.5–4.5)
PLATELET # BLD AUTO: 272 K/UL — SIGNIFICANT CHANGE UP (ref 150–400)
POTASSIUM SERPL-MCNC: 3.4 MMOL/L — LOW (ref 3.5–5.3)
POTASSIUM SERPL-SCNC: 3.4 MMOL/L — LOW (ref 3.5–5.3)
PROT SERPL-MCNC: 7.9 GM/DL — SIGNIFICANT CHANGE UP (ref 6–8.3)
RBC # BLD: 3.88 M/UL — LOW (ref 4.2–5.8)
RBC # FLD: 13.9 % — SIGNIFICANT CHANGE UP (ref 10.3–14.5)
SODIUM SERPL-SCNC: 141 MMOL/L — SIGNIFICANT CHANGE UP (ref 135–145)
WBC # BLD: 3.85 K/UL — SIGNIFICANT CHANGE UP (ref 3.8–10.5)
WBC # FLD AUTO: 3.85 K/UL — SIGNIFICANT CHANGE UP (ref 3.8–10.5)

## 2020-07-11 PROCEDURE — 99232 SBSQ HOSP IP/OBS MODERATE 35: CPT

## 2020-07-11 RX ORDER — POTASSIUM CHLORIDE 20 MEQ
20 PACKET (EA) ORAL
Refills: 0 | Status: COMPLETED | OUTPATIENT
Start: 2020-07-11 | End: 2020-07-11

## 2020-07-11 RX ORDER — LACTULOSE 10 G/15ML
10 SOLUTION ORAL ONCE
Refills: 0 | Status: COMPLETED | OUTPATIENT
Start: 2020-07-11 | End: 2020-07-11

## 2020-07-11 RX ADMIN — Medication 1 TABLET(S): at 12:42

## 2020-07-11 RX ADMIN — Medication 32.4 MILLIGRAM(S): at 21:16

## 2020-07-11 RX ADMIN — Medication 20 MILLIEQUIVALENT(S): at 12:43

## 2020-07-11 RX ADMIN — Medication 1 MILLIGRAM(S): at 12:42

## 2020-07-11 RX ADMIN — OXYCODONE AND ACETAMINOPHEN 2 TABLET(S): 5; 325 TABLET ORAL at 23:30

## 2020-07-11 RX ADMIN — Medication 20 MILLIEQUIVALENT(S): at 21:12

## 2020-07-11 RX ADMIN — LACTULOSE 10 GRAM(S): 10 SOLUTION ORAL at 06:59

## 2020-07-11 RX ADMIN — OXYCODONE AND ACETAMINOPHEN 2 TABLET(S): 5; 325 TABLET ORAL at 14:46

## 2020-07-11 RX ADMIN — PANTOPRAZOLE SODIUM 40 MILLIGRAM(S): 20 TABLET, DELAYED RELEASE ORAL at 05:20

## 2020-07-11 RX ADMIN — Medication 20 MILLIEQUIVALENT(S): at 18:57

## 2020-07-11 RX ADMIN — Medication 100 MILLIGRAM(S): at 12:42

## 2020-07-11 RX ADMIN — Medication 32.4 MILLIGRAM(S): at 05:21

## 2020-07-11 RX ADMIN — Medication 32.4 MILLIGRAM(S): at 12:43

## 2020-07-11 NOTE — PROGRESS NOTE ADULT - SUBJECTIVE AND OBJECTIVE BOX
HPI:  54 y/o M   w/ PMHX of Gastritis, Alcohol Abuse   now presenting w/ shakes, nausea and vomiting x today.    Patient has been admitted on multiple occasions for alcohol withdrawal    Patient states his last drink was this past weekend for 4th of July celebration, patient states he drank 1 bottle of vodka.    Patient now is tremulous w/ nausea and vomiting and intermittent abdominal pain..  all of which has   since  resolved    No fever, chills, chest pain, SOB.   on chart  review, it appears  that  pt  come s to  this  hospitals  about  6  to  7 times. every  month, for  the  same  problem (08 Jul 2020 15:01)  Patient is a 55y old  Male who presents with a chief complaint of Alcohol Withdrawal (10 Jul 2020 09:25)      INTERVAL HPI/OVERNIGHT EVENTS:    MEDICATIONS  (STANDING):  folic acid 1 milliGRAM(s) Oral daily  multivitamin 1 Tablet(s) Oral daily  pantoprazole    Tablet 40 milliGRAM(s) Oral before breakfast  PHENobarbital 32.4 milliGRAM(s) Oral three times a day  potassium chloride    Tablet ER 20 milliEquivalent(s) Oral every 2 hours  thiamine Injectable 100 milliGRAM(s) IV Push daily    MEDICATIONS  (PRN):  ibuprofen  Tablet. 200 milliGRAM(s) Oral every 6 hours PRN Temp greater or equal to 38C (100.4F), Mild Pain (1 - 3)  LORazepam   Injectable 2 milliGRAM(s) IV Push every 2 hours PRN Symptom-triggered: each CIWA -Ar score 8 or GREATER  ondansetron Injectable 8 milliGRAM(s) IV Push every 6 hours PRN Nausea and/or Vomiting  oxycodone    5 mG/acetaminophen 325 mG 2 Tablet(s) Oral every 4 hours PRN Moderate Pain (4 - 6)      Allergies    No Known Allergies    Intolerances        REVIEW OF SYSTEMS:  CONSTITUTIONAL: No fever, weight loss, or fatigue  EYES: No eye pain, visual disturbances, or discharge  ENMT:  No difficulty hearing, tinnitus, vertigo; No sinus or throat pain  NECK: No pain or stiffness  BREASTS: No pain, masses, or nipple discharge  RESPIRATORY: No cough, wheezing, chills or hemoptysis; No shortness of breath  CARDIOVASCULAR: No chest pain, palpitations, dizziness, or leg swelling  GASTROINTESTINAL: No abdominal or epigastric pain. No nausea, vomiting, or hematemesis; No diarrhea or constipation. No melena or hematochezia.  GENITOURINARY: No dysuria, frequency, hematuria, or incontinence  NEUROLOGICAL: No headaches, memory loss, loss of strength, numbness, or tremors  SKIN: No itching, burning, rashes, or lesions   LYMPH NODES: No enlarged glands  ENDOCRINE: No heat or cold intolerance; No hair loss  MUSCULOSKELETAL: No joint pain or swelling; No muscle, back, or extremity pain  PSYCHIATRIC: No depression, anxiety, mood swings, or difficulty sleeping  HEME/LYMPH: No easy bruising, or bleeding gums  ALLERGY AND IMMUNOLOGIC: No hives or eczema    Vital Signs Last 24 Hrs  T(C): 37.2 (11 Jul 2020 11:45), Max: 37.2 (11 Jul 2020 11:45)  T(F): 98.9 (11 Jul 2020 11:45), Max: 98.9 (11 Jul 2020 11:45)  HR: 78 (11 Jul 2020 11:45) (54 - 78)  BP: 144/88 (11 Jul 2020 11:45) (131/75 - 152/92)  BP(mean): --  RR: 18 (11 Jul 2020 11:45) (18 - 18)  SpO2: 99% (11 Jul 2020 11:45) (99% - 100%)    PHYSICAL EXAM:  GENERAL: NAD, well-groomed, well-developed  HEAD:  Atraumatic, Normocephalic  EYES: EOMI, PERRLA, conjunctiva and sclera clear  ENMT: No tonsillar erythema, exudates, or enlargement; Moist mucous membranes, Good dentition, No lesions  NECK: Supple, No JVD, Normal thyroid  NERVOUS SYSTEM:  Alert & Oriented X3, tremors CHEST/LUNG: Clear to percussion bilaterally; No rales, rhonchi, wheezing, or rubs  HEART: Regular rate and rhythm; No murmurs, rubs, or gallops  ABDOMEN: Soft, Nontender, Nondistended; Bowel sounds present  EXTREMITIES:  2+ Peripheral Pulses, No clubbing, cyanosis, or edema  LYMPH: No lymphadenopathy noted  SKIN: No rashes or lesions    LABS:                        11.4   3.85  )-----------( 272      ( 11 Jul 2020 10:39 )             36.3     07-11    141  |  106  |  10  ----------------------------<  95  3.4<L>   |  28  |  0.93    Ca    9.0      11 Jul 2020 10:39  Phos  3.0     07-11  Mg     1.9     07-11    TPro  7.9  /  Alb  3.8  /  TBili  0.4  /  DBili  x   /  AST  44<H>  /  ALT  73  /  AlkPhos  37<L>  07-11        CAPILLARY BLOOD GLUCOSE          RADIOLOGY & ADDITIONAL TESTS:    Imaging Personally Reviewed:  [X ] YES  [ ] NO    Consultant(s) Notes Reviewed:  [X ] YES  [ ] NO    Care Discussed with Consultants/Other Providers [X ] YES  [ ] NO

## 2020-07-12 ENCOUNTER — TRANSCRIPTION ENCOUNTER (OUTPATIENT)
Age: 55
End: 2020-07-12

## 2020-07-12 VITALS
TEMPERATURE: 98 F | RESPIRATION RATE: 18 BRPM | SYSTOLIC BLOOD PRESSURE: 136 MMHG | DIASTOLIC BLOOD PRESSURE: 93 MMHG | OXYGEN SATURATION: 100 % | HEART RATE: 65 BPM

## 2020-07-12 LAB
ANION GAP SERPL CALC-SCNC: 9 MMOL/L — SIGNIFICANT CHANGE UP (ref 5–17)
BUN SERPL-MCNC: 13 MG/DL — SIGNIFICANT CHANGE UP (ref 7–23)
CALCIUM SERPL-MCNC: 8.7 MG/DL — SIGNIFICANT CHANGE UP (ref 8.5–10.1)
CHLORIDE SERPL-SCNC: 107 MMOL/L — SIGNIFICANT CHANGE UP (ref 96–108)
CO2 SERPL-SCNC: 24 MMOL/L — SIGNIFICANT CHANGE UP (ref 22–31)
CREAT SERPL-MCNC: 0.77 MG/DL — SIGNIFICANT CHANGE UP (ref 0.5–1.3)
GLUCOSE SERPL-MCNC: 79 MG/DL — SIGNIFICANT CHANGE UP (ref 70–99)
HCT VFR BLD CALC: 32.6 % — LOW (ref 39–50)
HGB BLD-MCNC: 10.7 G/DL — LOW (ref 13–17)
MAGNESIUM SERPL-MCNC: 1.9 MG/DL — SIGNIFICANT CHANGE UP (ref 1.6–2.6)
MCHC RBC-ENTMCNC: 29.4 PG — SIGNIFICANT CHANGE UP (ref 27–34)
MCHC RBC-ENTMCNC: 32.8 GM/DL — SIGNIFICANT CHANGE UP (ref 32–36)
MCV RBC AUTO: 89.6 FL — SIGNIFICANT CHANGE UP (ref 80–100)
NRBC # BLD: 0 /100 WBCS — SIGNIFICANT CHANGE UP (ref 0–0)
PHOSPHATE SERPL-MCNC: 3.5 MG/DL — SIGNIFICANT CHANGE UP (ref 2.5–4.5)
PLATELET # BLD AUTO: 298 K/UL — SIGNIFICANT CHANGE UP (ref 150–400)
POTASSIUM SERPL-MCNC: 3.8 MMOL/L — SIGNIFICANT CHANGE UP (ref 3.5–5.3)
POTASSIUM SERPL-SCNC: 3.8 MMOL/L — SIGNIFICANT CHANGE UP (ref 3.5–5.3)
RBC # BLD: 3.64 M/UL — LOW (ref 4.2–5.8)
RBC # FLD: 13.8 % — SIGNIFICANT CHANGE UP (ref 10.3–14.5)
SODIUM SERPL-SCNC: 140 MMOL/L — SIGNIFICANT CHANGE UP (ref 135–145)
WBC # BLD: 3.4 K/UL — LOW (ref 3.8–10.5)
WBC # FLD AUTO: 3.4 K/UL — LOW (ref 3.8–10.5)

## 2020-07-12 PROCEDURE — 99239 HOSP IP/OBS DSCHRG MGMT >30: CPT

## 2020-07-12 RX ORDER — IBUPROFEN 200 MG
3 TABLET ORAL
Qty: 60 | Refills: 0
Start: 2020-07-12 | End: 2020-07-16

## 2020-07-12 RX ORDER — THIAMINE MONONITRATE (VIT B1) 100 MG
100 TABLET ORAL DAILY
Refills: 0 | Status: COMPLETED | OUTPATIENT
Start: 2020-07-12 | End: 2020-07-12

## 2020-07-12 RX ADMIN — Medication 100 MILLIGRAM(S): at 12:01

## 2020-07-12 NOTE — DISCHARGE NOTE NURSING/CASE MANAGEMENT/SOCIAL WORK - PATIENT PORTAL LINK FT
You can access the FollowMyHealth Patient Portal offered by [facility] by registering at the following website: [email or web address]. By joining [employer]’s FollowMyHealth portal, you will also be able to view your health information using other applications (apps) compatible with our system.

## 2020-07-12 NOTE — DISCHARGE NOTE PROVIDER - HOSPITAL COURSE
HPI:    54 y/o M     w/ PMHX of Gastritis, Alcohol Abuse     now presenting w/ shakes, nausea and vomiting x today.      Patient has been admitted on multiple occasions for alcohol withdrawal      Patient states his last drink was this past weekend for 4th of July celebration, patient states he drank 1 bottle of vodka.      Patient now is tremulous w/ nausea and vomiting and intermittent abdominal pain..  all of which has   since  resolved      No fever, chills, chest pain, SOB.     on chart  review, it appears  that  pt  come s to  this  hospitals  about  6  to  7 times. every  month, for  the  same  problem (08 Jul 2020 15:01)    Assessment and Plan:     · Assessment        Acute alcohol withdrawal in a patient with hx of chronic alcohol dependence and ongoing alcohol abuse. Multiple hospitalizations. at high risk for DTs and/or severe withdrawal. Given phenobarbitone x 1 and  now on scheduled oral phenobarbitone x 2 days with holding parameters. cont iv ativan prn. cont CIWA protocol. cont aspiration, seizure and fall precautions. follow labs. cont IVF. telemonitoring.     chronic alcoholic hepatitis and leukopenia. advised quitting alcohol.     Chronic low back pain. cont tylenol prn.         DVT Ppx: SCDs    Full Code.

## 2020-07-12 NOTE — DISCHARGE NOTE PROVIDER - NSDCCPCAREPLAN_GEN_ALL_CORE_FT
PRINCIPAL DISCHARGE DIAGNOSIS  Diagnosis: Alcohol withdrawal syndrome without complication  Assessment and Plan of Treatment: please stop drinking

## 2020-07-12 NOTE — CHART NOTE - NSCHARTNOTEFT_GEN_A_CORE
Patient seen and examined at bedside.  Left arm cleaned with Alcohol swabs prior to removal. Midline catheter removed and pressure applied for 5-10 minutes with gauze. Hemastasis achieved, no signs of active bleeding noted. Tegaderm and gauze used to create pressure dressing to maintain pressure on midline catheter site. Patient tolerated well without complications. Will continue to follow. RN to call/page if any changes.

## 2020-07-16 DIAGNOSIS — E83.52 HYPERCALCEMIA: ICD-10-CM

## 2020-07-16 DIAGNOSIS — F10.231 ALCOHOL DEPENDENCE WITH WITHDRAWAL DELIRIUM: ICD-10-CM

## 2020-07-16 DIAGNOSIS — F10.239 ALCOHOL DEPENDENCE WITH WITHDRAWAL, UNSPECIFIED: ICD-10-CM

## 2020-07-16 DIAGNOSIS — K70.10 ALCOHOLIC HEPATITIS WITHOUT ASCITES: ICD-10-CM

## 2020-07-16 DIAGNOSIS — E78.5 HYPERLIPIDEMIA, UNSPECIFIED: ICD-10-CM

## 2020-08-07 NOTE — ED ADULT NURSE NOTE - PRO INTERPRETER NEED 2
Electrodesiccation And Curettage Text: The wound bed was treated with electrodesiccation and curettage after the biopsy was performed. English

## 2020-09-10 ENCOUNTER — INPATIENT (INPATIENT)
Facility: HOSPITAL | Age: 55
LOS: 9 days | Discharge: HOME | End: 2020-09-20
Attending: GENERAL ACUTE CARE HOSPITAL | Admitting: GENERAL ACUTE CARE HOSPITAL
Payer: MEDICAID

## 2020-09-10 VITALS
RESPIRATION RATE: 18 BRPM | OXYGEN SATURATION: 96 % | DIASTOLIC BLOOD PRESSURE: 101 MMHG | WEIGHT: 162.04 LBS | TEMPERATURE: 99 F | SYSTOLIC BLOOD PRESSURE: 139 MMHG | HEIGHT: 67 IN | HEART RATE: 135 BPM

## 2020-09-10 DIAGNOSIS — K29.50 UNSPECIFIED CHRONIC GASTRITIS WITHOUT BLEEDING: ICD-10-CM

## 2020-09-10 DIAGNOSIS — F10.231 ALCOHOL DEPENDENCE WITH WITHDRAWAL DELIRIUM: ICD-10-CM

## 2020-09-10 LAB
ALBUMIN SERPL ELPH-MCNC: 4.4 G/DL — SIGNIFICANT CHANGE UP (ref 3.3–5)
ALP SERPL-CCNC: 60 U/L — SIGNIFICANT CHANGE UP (ref 40–120)
ALT FLD-CCNC: 32 U/L — SIGNIFICANT CHANGE UP (ref 12–78)
AMYLASE P1 CFR SERPL: 144 U/L — HIGH (ref 25–115)
ANION GAP SERPL CALC-SCNC: 18 MMOL/L — HIGH (ref 5–17)
ANION GAP SERPL CALC-SCNC: 25 MMOL/L — HIGH (ref 5–17)
APPEARANCE UR: CLEAR — SIGNIFICANT CHANGE UP
APTT BLD: 26.5 SEC — LOW (ref 27.5–35.5)
AST SERPL-CCNC: 51 U/L — HIGH (ref 15–37)
BASE EXCESS BLDA CALC-SCNC: -7 MMOL/L — LOW (ref -2–2)
BASOPHILS # BLD AUTO: 0.06 K/UL — SIGNIFICANT CHANGE UP (ref 0–0.2)
BASOPHILS NFR BLD AUTO: 0.6 % — SIGNIFICANT CHANGE UP (ref 0–2)
BILIRUB SERPL-MCNC: 0.7 MG/DL — SIGNIFICANT CHANGE UP (ref 0.2–1.2)
BILIRUB UR-MCNC: NEGATIVE — SIGNIFICANT CHANGE UP
BLD GP AB SCN SERPL QL: SIGNIFICANT CHANGE UP
BLOOD GAS COMMENTS: SIGNIFICANT CHANGE UP
BLOOD GAS COMMENTS: SIGNIFICANT CHANGE UP
BLOOD GAS SOURCE: SIGNIFICANT CHANGE UP
BUN SERPL-MCNC: 16 MG/DL — SIGNIFICANT CHANGE UP (ref 7–23)
BUN SERPL-MCNC: 19 MG/DL — SIGNIFICANT CHANGE UP (ref 7–23)
CALCIUM SERPL-MCNC: 7.5 MG/DL — LOW (ref 8.5–10.1)
CALCIUM SERPL-MCNC: 9 MG/DL — SIGNIFICANT CHANGE UP (ref 8.5–10.1)
CHLORIDE SERPL-SCNC: 105 MMOL/L — SIGNIFICANT CHANGE UP (ref 96–108)
CHLORIDE SERPL-SCNC: 93 MMOL/L — LOW (ref 96–108)
CO2 SERPL-SCNC: 17 MMOL/L — LOW (ref 22–31)
CO2 SERPL-SCNC: 19 MMOL/L — LOW (ref 22–31)
COLOR SPEC: YELLOW — SIGNIFICANT CHANGE UP
CREAT SERPL-MCNC: 1.08 MG/DL — SIGNIFICANT CHANGE UP (ref 0.5–1.3)
CREAT SERPL-MCNC: 1.41 MG/DL — HIGH (ref 0.5–1.3)
D DIMER BLD IA.RAPID-MCNC: 222 NG/ML DDU — SIGNIFICANT CHANGE UP
DIFF PNL FLD: ABNORMAL
EOSINOPHIL # BLD AUTO: 0 K/UL — SIGNIFICANT CHANGE UP (ref 0–0.5)
EOSINOPHIL NFR BLD AUTO: 0 % — SIGNIFICANT CHANGE UP (ref 0–6)
ETHANOL SERPL-MCNC: 223 MG/DL — HIGH (ref 0–10)
GLUCOSE SERPL-MCNC: 101 MG/DL — HIGH (ref 70–99)
GLUCOSE SERPL-MCNC: 89 MG/DL — SIGNIFICANT CHANGE UP (ref 70–99)
GLUCOSE UR QL: NEGATIVE MG/DL — SIGNIFICANT CHANGE UP
HCO3 BLDA-SCNC: 18 MMOL/L — LOW (ref 21–29)
HCT VFR BLD CALC: 42.1 % — SIGNIFICANT CHANGE UP (ref 39–50)
HGB BLD-MCNC: 14.1 G/DL — SIGNIFICANT CHANGE UP (ref 13–17)
HOROWITZ INDEX BLDA+IHG-RTO: 21 — SIGNIFICANT CHANGE UP
IMM GRANULOCYTES NFR BLD AUTO: 0.4 % — SIGNIFICANT CHANGE UP (ref 0–1.5)
INR BLD: 1 RATIO — SIGNIFICANT CHANGE UP (ref 0.88–1.16)
KETONES UR-MCNC: ABNORMAL
LACTATE SERPL-SCNC: 10.9 MMOL/L — CRITICAL HIGH (ref 0.7–2)
LACTATE SERPL-SCNC: 8.4 MMOL/L — CRITICAL HIGH (ref 0.7–2)
LEUKOCYTE ESTERASE UR-ACNC: NEGATIVE — SIGNIFICANT CHANGE UP
LIDOCAIN IGE QN: 125 U/L — SIGNIFICANT CHANGE UP (ref 73–393)
LYMPHOCYTES # BLD AUTO: 1.09 K/UL — SIGNIFICANT CHANGE UP (ref 1–3.3)
LYMPHOCYTES # BLD AUTO: 11.2 % — LOW (ref 13–44)
MAGNESIUM SERPL-MCNC: 1.5 MG/DL — LOW (ref 1.6–2.6)
MAGNESIUM SERPL-MCNC: 2.3 MG/DL — SIGNIFICANT CHANGE UP (ref 1.6–2.6)
MCHC RBC-ENTMCNC: 28.2 PG — SIGNIFICANT CHANGE UP (ref 27–34)
MCHC RBC-ENTMCNC: 33.5 GM/DL — SIGNIFICANT CHANGE UP (ref 32–36)
MCV RBC AUTO: 84.2 FL — SIGNIFICANT CHANGE UP (ref 80–100)
MONOCYTES # BLD AUTO: 0.29 K/UL — SIGNIFICANT CHANGE UP (ref 0–0.9)
MONOCYTES NFR BLD AUTO: 3 % — SIGNIFICANT CHANGE UP (ref 2–14)
NEUTROPHILS # BLD AUTO: 8.29 K/UL — HIGH (ref 1.8–7.4)
NEUTROPHILS NFR BLD AUTO: 84.8 % — HIGH (ref 43–77)
NITRITE UR-MCNC: NEGATIVE — SIGNIFICANT CHANGE UP
NRBC # BLD: 0 /100 WBCS — SIGNIFICANT CHANGE UP (ref 0–0)
NT-PROBNP SERPL-SCNC: 22 PG/ML — SIGNIFICANT CHANGE UP (ref 0–125)
OB PNL STL: NEGATIVE — SIGNIFICANT CHANGE UP
PCO2 BLDA: 36 MMHG — SIGNIFICANT CHANGE UP (ref 32–46)
PH BLD: 7.32 — LOW (ref 7.35–7.45)
PH UR: 5 — SIGNIFICANT CHANGE UP (ref 5–8)
PHOSPHATE SERPL-MCNC: 2.4 MG/DL — LOW (ref 2.5–4.5)
PLATELET # BLD AUTO: 288 K/UL — SIGNIFICANT CHANGE UP (ref 150–400)
PO2 BLDA: 83 MMHG — SIGNIFICANT CHANGE UP (ref 74–108)
POTASSIUM SERPL-MCNC: 3.6 MMOL/L — SIGNIFICANT CHANGE UP (ref 3.5–5.3)
POTASSIUM SERPL-MCNC: 4.3 MMOL/L — SIGNIFICANT CHANGE UP (ref 3.5–5.3)
POTASSIUM SERPL-SCNC: 3.6 MMOL/L — SIGNIFICANT CHANGE UP (ref 3.5–5.3)
POTASSIUM SERPL-SCNC: 4.3 MMOL/L — SIGNIFICANT CHANGE UP (ref 3.5–5.3)
PROCALCITONIN SERPL-MCNC: 0.09 NG/ML — SIGNIFICANT CHANGE UP (ref 0.02–0.1)
PROT SERPL-MCNC: 9.5 GM/DL — HIGH (ref 6–8.3)
PROT UR-MCNC: 30 MG/DL
PROTHROM AB SERPL-ACNC: 11.6 SEC — SIGNIFICANT CHANGE UP (ref 10.6–13.6)
RBC # BLD: 5 M/UL — SIGNIFICANT CHANGE UP (ref 4.2–5.8)
RBC # FLD: 12.6 % — SIGNIFICANT CHANGE UP (ref 10.3–14.5)
SAO2 % BLDA: 94 % — SIGNIFICANT CHANGE UP (ref 92–96)
SARS-COV-2 RNA SPEC QL NAA+PROBE: SIGNIFICANT CHANGE UP
SODIUM SERPL-SCNC: 137 MMOL/L — SIGNIFICANT CHANGE UP (ref 135–145)
SODIUM SERPL-SCNC: 140 MMOL/L — SIGNIFICANT CHANGE UP (ref 135–145)
SP GR SPEC: 1.02 — SIGNIFICANT CHANGE UP (ref 1.01–1.02)
TROPONIN I SERPL-MCNC: <.015 NG/ML — SIGNIFICANT CHANGE UP (ref 0.01–0.04)
UROBILINOGEN FLD QL: NEGATIVE MG/DL — SIGNIFICANT CHANGE UP
WBC # BLD: 9.77 K/UL — SIGNIFICANT CHANGE UP (ref 3.8–10.5)
WBC # FLD AUTO: 9.77 K/UL — SIGNIFICANT CHANGE UP (ref 3.8–10.5)

## 2020-09-10 PROCEDURE — 71045 X-RAY EXAM CHEST 1 VIEW: CPT | Mod: 26

## 2020-09-10 PROCEDURE — 99291 CRITICAL CARE FIRST HOUR: CPT

## 2020-09-10 PROCEDURE — 74176 CT ABD & PELVIS W/O CONTRAST: CPT | Mod: 26

## 2020-09-10 PROCEDURE — 99223 1ST HOSP IP/OBS HIGH 75: CPT

## 2020-09-10 PROCEDURE — 99222 1ST HOSP IP/OBS MODERATE 55: CPT

## 2020-09-10 PROCEDURE — 93010 ELECTROCARDIOGRAM REPORT: CPT

## 2020-09-10 RX ORDER — THIAMINE MONONITRATE (VIT B1) 100 MG
100 TABLET ORAL ONCE
Refills: 0 | Status: COMPLETED | OUTPATIENT
Start: 2020-09-10 | End: 2020-09-10

## 2020-09-10 RX ORDER — MAGNESIUM SULFATE 500 MG/ML
2 VIAL (ML) INJECTION ONCE
Refills: 0 | Status: COMPLETED | OUTPATIENT
Start: 2020-09-10 | End: 2020-09-10

## 2020-09-10 RX ORDER — FOLIC ACID 0.8 MG
1 TABLET ORAL ONCE
Refills: 0 | Status: COMPLETED | OUTPATIENT
Start: 2020-09-10 | End: 2020-09-10

## 2020-09-10 RX ORDER — ACETAMINOPHEN 500 MG
650 TABLET ORAL ONCE
Refills: 0 | Status: COMPLETED | OUTPATIENT
Start: 2020-09-10 | End: 2020-09-10

## 2020-09-10 RX ORDER — ATORVASTATIN CALCIUM 80 MG/1
20 TABLET, FILM COATED ORAL AT BEDTIME
Refills: 0 | Status: DISCONTINUED | OUTPATIENT
Start: 2020-09-10 | End: 2020-09-20

## 2020-09-10 RX ORDER — METOPROLOL TARTRATE 50 MG
25 TABLET ORAL THREE TIMES A DAY
Refills: 0 | Status: DISCONTINUED | OUTPATIENT
Start: 2020-09-10 | End: 2020-09-11

## 2020-09-10 RX ORDER — THIAMINE MONONITRATE (VIT B1) 100 MG
100 TABLET ORAL DAILY
Refills: 0 | Status: DISCONTINUED | OUTPATIENT
Start: 2020-09-10 | End: 2020-09-20

## 2020-09-10 RX ORDER — PANTOPRAZOLE SODIUM 20 MG/1
80 TABLET, DELAYED RELEASE ORAL ONCE
Refills: 0 | Status: COMPLETED | OUTPATIENT
Start: 2020-09-10 | End: 2020-09-10

## 2020-09-10 RX ORDER — SODIUM CHLORIDE 9 MG/ML
2000 INJECTION INTRAMUSCULAR; INTRAVENOUS; SUBCUTANEOUS ONCE
Refills: 0 | Status: COMPLETED | OUTPATIENT
Start: 2020-09-10 | End: 2020-09-10

## 2020-09-10 RX ORDER — FOLIC ACID 0.8 MG
1 TABLET ORAL DAILY
Refills: 0 | Status: DISCONTINUED | OUTPATIENT
Start: 2020-09-10 | End: 2020-09-20

## 2020-09-10 RX ORDER — SODIUM CHLORIDE 9 MG/ML
2500 INJECTION, SOLUTION INTRAVENOUS ONCE
Refills: 0 | Status: COMPLETED | OUTPATIENT
Start: 2020-09-10 | End: 2020-09-10

## 2020-09-10 RX ORDER — ONDANSETRON 8 MG/1
8 TABLET, FILM COATED ORAL ONCE
Refills: 0 | Status: COMPLETED | OUTPATIENT
Start: 2020-09-10 | End: 2020-09-10

## 2020-09-10 RX ORDER — PANTOPRAZOLE SODIUM 20 MG/1
8 TABLET, DELAYED RELEASE ORAL
Qty: 80 | Refills: 0 | Status: DISCONTINUED | OUTPATIENT
Start: 2020-09-10 | End: 2020-09-11

## 2020-09-10 RX ORDER — HYDROMORPHONE HYDROCHLORIDE 2 MG/ML
1 INJECTION INTRAMUSCULAR; INTRAVENOUS; SUBCUTANEOUS ONCE
Refills: 0 | Status: DISCONTINUED | OUTPATIENT
Start: 2020-09-10 | End: 2020-09-10

## 2020-09-10 RX ORDER — SODIUM CHLORIDE 9 MG/ML
1000 INJECTION, SOLUTION INTRAVENOUS
Refills: 0 | Status: DISCONTINUED | OUTPATIENT
Start: 2020-09-10 | End: 2020-09-11

## 2020-09-10 RX ADMIN — PANTOPRAZOLE SODIUM 80 MILLIGRAM(S): 20 TABLET, DELAYED RELEASE ORAL at 15:29

## 2020-09-10 RX ADMIN — HYDROMORPHONE HYDROCHLORIDE 1 MILLIGRAM(S): 2 INJECTION INTRAMUSCULAR; INTRAVENOUS; SUBCUTANEOUS at 15:29

## 2020-09-10 RX ADMIN — Medication 50 GRAM(S): at 20:00

## 2020-09-10 RX ADMIN — Medication 2 MILLIGRAM(S): at 21:46

## 2020-09-10 RX ADMIN — ONDANSETRON 8 MILLIGRAM(S): 8 TABLET, FILM COATED ORAL at 15:27

## 2020-09-10 RX ADMIN — Medication 2 MILLIGRAM(S): at 23:24

## 2020-09-10 RX ADMIN — PANTOPRAZOLE SODIUM 10 MG/HR: 20 TABLET, DELAYED RELEASE ORAL at 16:38

## 2020-09-10 RX ADMIN — SODIUM CHLORIDE 2500 MILLILITER(S): 9 INJECTION, SOLUTION INTRAVENOUS at 15:26

## 2020-09-10 RX ADMIN — Medication 100 MILLIGRAM(S): at 16:40

## 2020-09-10 RX ADMIN — Medication 1 MILLIGRAM(S): at 16:40

## 2020-09-10 RX ADMIN — Medication 650 MILLIGRAM(S): at 15:26

## 2020-09-10 RX ADMIN — SODIUM CHLORIDE 2000 MILLILITER(S): 9 INJECTION INTRAMUSCULAR; INTRAVENOUS; SUBCUTANEOUS at 16:18

## 2020-09-10 RX ADMIN — Medication 50 MILLIGRAM(S): at 18:00

## 2020-09-10 RX ADMIN — Medication 25 MILLIGRAM(S): at 23:36

## 2020-09-10 RX ADMIN — Medication 650 MILLIGRAM(S): at 15:29

## 2020-09-10 RX ADMIN — HYDROMORPHONE HYDROCHLORIDE 1 MILLIGRAM(S): 2 INJECTION INTRAMUSCULAR; INTRAVENOUS; SUBCUTANEOUS at 15:33

## 2020-09-10 RX ADMIN — ATORVASTATIN CALCIUM 20 MILLIGRAM(S): 80 TABLET, FILM COATED ORAL at 23:23

## 2020-09-10 RX ADMIN — PANTOPRAZOLE SODIUM 10 MG/HR: 20 TABLET, DELAYED RELEASE ORAL at 15:44

## 2020-09-10 NOTE — ED ADULT NURSE NOTE - NSIMPLEMENTINTERV_GEN_ALL_ED
Implemented All Fall with Harm Risk Interventions:  [place] to call system. Call bell, personal items and telephone within reach. Instruct patient to call for assistance. Room bathroom lighting operational. Non-slip footwear when patient is off stretcher. Physically safe environment: no spills, clutter or unnecessary equipment. Stretcher in lowest position, wheels locked, appropriate side rails in place. Provide visual cue, wrist band, yellow gown, etc. Monitor gait and stability. Monitor for mental status changes and reorient to person, place, and time. Review medications for side effects contributing to fall risk. Reinforce activity limits and safety measures with patient and family. Provide visual clues: red socks.

## 2020-09-10 NOTE — CONSULT NOTE ADULT - PROBLEM SELECTOR RECOMMENDATION 2
agree with PPI bolus and drip, NPO for now  D/W GI attending at the bedside  HGB is 14. would trend interval CBCs, Q12h seems reasonable

## 2020-09-10 NOTE — ED ADULT NURSE NOTE - CHIEF COMPLAINT QUOTE
Abd pain and vomiting with body aches, flushed skin says he's burning all over, drives a taxi in [place]

## 2020-09-10 NOTE — H&P ADULT - NSHPLABSRESULTS_GEN_ALL_CORE
LABS:                        14.1   9.77  )-----------( 288      ( 10 Sep 2020 15:31 )             42.1     09-10    140  |  105  |  16  ----------------------------<  89  4.3   |  17<L>  |  1.08    Ca    7.5<L>      10 Sep 2020 18:24  Mg     1.5     09-10    TPro  9.5<H>  /  Alb  4.4  /  TBili  0.7  /  DBili  x   /  AST  51<H>  /  ALT  32  /  AlkPhos  60  09-10    PT/INR - ( 10 Sep 2020 15:31 )   PT: 11.6 sec;   INR: 1.00 ratio         PTT - ( 10 Sep 2020 15:31 )  PTT:26.5 sec    CAPILLARY BLOOD GLUCOSE            RADIOLOGY & ADDITIONAL TESTS:    Imaging Personally Reviewed:  [ X] YES  [ ] NO

## 2020-09-10 NOTE — ED ADULT NURSE NOTE - ED STAT RN HANDOFF DETAILS 2
Received report from Lidya/PRETTY and assumed care of pt. Pt A/O x4, NAD noted, VSS, c/o pain, awaiting admit orders, will continue to monitor.

## 2020-09-10 NOTE — ED PROVIDER NOTE - PROGRESS NOTE DETAILS
ICU PAULA Cornejo is notified. Dr. Jones icu attending is here eval pt and sts pt can be admitted to telemetry if labs improved. Dr. Serrano gastroenterologist was here ros pt. Dr. Albert icu attending is notified about the repeat blood test and sts pt can be admitted to telemetry.

## 2020-09-10 NOTE — ED PROVIDER NOTE - OBJECTIVE STATEMENT
55 years old male by ems c/o epigastric and mid abd pain vomiting coffee ground since yesterday and c/o feeling hot all over his body. Pt sts he drinks alcohol three to four times a week and last drink was 4 days ago. Pt denies headache, dizziness, blurred visions, light sensitivities, focal/distal weakness or numbness, cough, sob, chest pain, dysuria, or irregular bowel movements.

## 2020-09-10 NOTE — CONSULT NOTE ADULT - ASSESSMENT
PI:    · Chief Complaint: The patient is a 55y Male complaining of abdominal pain.	  · HPI Objective Statement: 55 years old male by ems c/o epigastric and mid abd pain vomiting coffee ground since yesterday and c/o feeling hot all over his body. Pt sts he drinks alcohol three to four times a week and last drink was 4 days ago. Pt denies headache, dizziness, blurred visions, light sensitivities, focal/distal weakness or numbness, cough, sob, chest pain, dysuria, or irregular bowel movements.	  ----------------- As Above ---------------------- Seen earlier today  Patient is a poor historian. Patient states that he has been having coffee ground emesis over the past 3 - 4 days. No blood but very dark. ETOH abuse (+) Upper abdominal pain. No melena.  Denies NSAIDs.   Patient has been admitted for the same reason multiple times over he past few years. Last EGD was 5/ 2020- gastritis.   See labs  / CT scan      Upper GI bleed - Probably alcoholic gastritis  -1) IV PPI  2) NPO  3) f/u labs 4) ?EGD 5) Zofran PRN patient

## 2020-09-10 NOTE — CONSULT NOTE ADULT - SUBJECTIVE AND OBJECTIVE BOX
55 M ETOH abuse, alcoholic gastritis, multiple admits for DTs and gastritis a/w lactemia 10 N/V, ROSA. CT. Denies hematemesis, Claims last drink 4 days ago denies melena, hamatochezia    HPI:      Allergies    No Known Allergies    Intolerances        MEDICATIONS  (STANDING):  pantoprazole Infusion 8 mG/Hr (10 mL/Hr) IV Continuous <Continuous>    MEDICATIONS  (PRN):      Daily Height in cm: 170.18 (10 Sep 2020 14:40)    Daily     Drug Dosing Weight  Height (cm): 170.18 (10 Sep 2020 14:40)  Weight (kg): 73.5 (10 Sep 2020 14:40)  BMI (kg/m2): 25.4 (10 Sep 2020 14:40)  BSA (m2): 1.85 (10 Sep 2020 14:40)    PAST MEDICAL & SURGICAL HISTORY:  DTs (delirium tremens)  Substance abuse  Gastritis  EtOH dependence  Mixed hyperlipidemia  PUD (peptic ulcer disease)  No significant past surgical history      FAMILY HISTORY:  No pertinent family history in first degree relatives (Father, Mother)      SOCIAL HISTORY:    ADVANCE DIRECTIVES:    REVIEW OF SYSTEMS:    nausea, vomitting, no CP, no palpitation, No Dizziness        ICU Vital Signs Last 24 Hrs  T(C): 36.7 (10 Sep 2020 17:23), Max: 37.2 (10 Sep 2020 14:40)  T(F): 98.1 (10 Sep 2020 17:23), Max: 99 (10 Sep 2020 14:40)  HR: 102 (10 Sep 2020 17:23) (102 - 135)  BP: 138/92 (10 Sep 2020 17:23) (138/92 - 146/89)  BP(mean): --  ABP: --  ABP(mean): --  RR: 19 (10 Sep 2020 17:23) (18 - 19)  SpO2: 96% (10 Sep 2020 17:23) (95% - 97%)      ABG - ( 10 Sep 2020 17:46 )  pH, Arterial: x     pH, Blood: 7.32  /  pCO2: 36    /  pO2: 83    / HCO3: 18    / Base Excess: -7.0  /  SaO2: 94                  I&O's Detail    10 Sep 2020 07:01  -  10 Sep 2020 18:23  --------------------------------------------------------  IN:    Lactated Ringers IV Bolus: 2500 mL    Oral Fluid: 250 mL    pantoprazole Infusion: 40 mL    Sodium Chloride 0.9% IV Bolus: 2000 mL  Total IN: 4790 mL    OUT:  Total OUT: 0 mL    Total NET: 4790 mL          PHYSICAL EXAM:    GENERAL: NAD, well-groomed, well-developed  HEAD:  Atraumatic, Normocephalic  EYES: EOMI, PERRLA, conjunctiva and sclera clear  ENMT: No tonsillar erythema, exudates, or enlargement; Moist mucous membranes, Good dentition, No lesions  NECK: Supple, No JVD, Normal thyroid  NERVOUS SYSTEM:  Alert & Oriented X3, Good concentration; Motor Strength 5/5 B/L upper and lower extremities; DTRs 2+ intact and symmetric  CHEST/LUNG: Clear to percussion bilaterally; No rales, rhonchi, wheezing, or rubs  HEART: Regular rate and rhythm; No murmurs, rubs, or gallops  ABDOMEN: Soft, Nontender, Nondistended; Bowel sounds present,. no anderson's and no reboud or buarding  EXTREMITIES:  2+ Peripheral Pulses, No clubbing, cyanosis, or edema  LYMPH: No lymphadenopathy noted  SKIN: No rashes or lesions    LABS:  CBC Full  -  ( 10 Sep 2020 15:31 )  WBC Count : 9.77 K/uL  RBC Count : 5.00 M/uL  Hemoglobin : 14.1 g/dL  Hematocrit : 42.1 %  Platelet Count - Automated : 288 K/uL  Mean Cell Volume : 84.2 fl  Mean Cell Hemoglobin : 28.2 pg  Mean Cell Hemoglobin Concentration : 33.5 gm/dL  Auto Neutrophil # : 8.29 K/uL  Auto Lymphocyte # : 1.09 K/uL  Auto Monocyte # : 0.29 K/uL  Auto Eosinophil # : 0.00 K/uL  Auto Basophil # : 0.06 K/uL  Auto Neutrophil % : 84.8 %  Auto Lymphocyte % : 11.2 %  Auto Monocyte % : 3.0 %  Auto Eosinophil % : 0.0 %  Auto Basophil % : 0.6 %    09-10    137  |  93<L>  |  19  ----------------------------<  101<H>  3.6   |  19<L>  |  1.41<H>    Ca    9.0      10 Sep 2020 15:31    TPro  9.5<H>  /  Alb  4.4  /  TBili  0.7  /  DBili  x   /  AST  51<H>  /  ALT  32  /  AlkPhos  60  09-10    CAPILLARY BLOOD GLUCOSE        PT/INR - ( 10 Sep 2020 15:31 )   PT: 11.6 sec;   INR: 1.00 ratio         PTT - ( 10 Sep 2020 15:31 )  PTT:26.5 sec    CARDIAC MARKERS ( 10 Sep 2020 15:31 )  <.015 ng/mL / x     / x     / x     / x              EKG:    ECHO, US:    RADIOLOGY:< from: CT Abdomen and Pelvis No Cont (09.10.20 @ 17:31) >  IMPRESSION:    Evaluation of the solid organs, vascular structures and GI tract is limited without oral and IV contrast.    Mildly distended fluid-filled stomach secondary to gastroparesis or partial gastric outlet obstruction.    Nonspecific ascending colitis.    Small sliding hiatus hernia with thickened distal esophagus. Recommendendoscopy.    Distended urinary bladder.    Hepatic steatosis.      < end of copied text >      CRITICAL CARE TIME SPENT:

## 2020-09-10 NOTE — CONSULT NOTE ADULT - PROBLEM SELECTOR RECOMMENDATION 9
I suspect lactemia is either from Sz in setting of DTs or alcoholic ketoacidosis. Hgb 14, probably dry, has been well hydrated would repeat lactate. Would check procalcitonin. afebrile no leucocytosis, could check procalcitonin. librium /ativan for impending withdrawal. repeat lactate after hydration. Seems reasonable to watch off ABX for now.  I expect lactate to clear with hydration, also could check prolactin ? Did He Sz in setting of withdrawal?   Can go to floors.

## 2020-09-10 NOTE — H&P ADULT - HISTORY OF PRESENT ILLNESS
Pt is a 54 y/o male w/ ETOH abuse w/frequent admissions,  GERD, HLD, alcoholic gastritis/esophagitis, PUD, hx of hypoxic respiratory failure requiring intubation due to aspiration PNA comes w/abdominal pain mostly in epigastric area and also reports emesis states coffee ground color.  Pt was last admitted 2 months ago for abdominal pains.  Reports pain mostly in epigastric area, worse w/eating for last 4 days w/decreased po intake and mostly just drinking as a result over this time. Usually drinks vodka almost a bottle on regular basis. no fever, chills, sob, cp, palpitaions, +n/+v/-d/-c no travesl or sick contacts.

## 2020-09-10 NOTE — ED ADULT NURSE NOTE - OBJECTIVE STATEMENT
pt biba awake, alert by ems c/o epigastric and mid abd pain vomiting coffee ground since yesterday and c/o feeling hot all over his body. Pt states he drinks alcohol three to four times a week and last drink was 4 days ago. Pt denies headache, dizziness, blurred visions, light sensitivities, focal/distal weakness or numbness, cough, sob, chest pain, dysuria, or irregular bowel movements.

## 2020-09-10 NOTE — H&P ADULT - NSHPPHYSICALEXAM_GEN_ALL_CORE
Vital Signs Last 24 Hrs  T(C): 36.8 (10 Sep 2020 21:46), Max: 37.2 (10 Sep 2020 14:40)  T(F): 98.3 (10 Sep 2020 21:46), Max: 99 (10 Sep 2020 14:40)  HR: 73 (10 Sep 2020 21:46) (73 - 135)  BP: 116/72 (10 Sep 2020 21:46) (111/67 - 146/89)  BP(mean): --  RR: 17 (10 Sep 2020 21:46) (17 - 19)  SpO2: 96% (10 Sep 2020 21:46) (95% - 97%)    PHYSICAL EXAM:    GENERAL: NAD, well-groomed, well-developed  HEAD:  Atraumatic, Normocephalic  EYES: EOMI, PERRLA, conjunctiva and sclera clear  ENMT: No tonsillar erythema, exudates, or enlargement; Moist mucous membranes, No lesions  NECK: Supple, No JVD, Normal thyroid  NERVOUS SYSTEM:  Alert & Oriented X3, Motor Strength 5/5 B/L upper and lower extremities; DTRs 2+ intact and symmetric slightly tremulous  CHEST/LUNG: Clear to percussion bilaterally; No rales, rhonchi, wheezing, or rubs  HEART: Regular rate and rhythm; No rubs, or gallops, +S1,S2  ABDOMEN: Soft, Nontender, Nondistended; Bowel sounds present  EXTREMITIES:  2+ Peripheral Pulses, No clubbing, cyanosis, or edema  LYMPH: No cervical adenopathy  RECTAL: deferred  BREAST: No palpatble masses, skin no lesions   : deferred  SKIN: No rashes or lesions    IMPROVE VTE Individual Risk Assessment        RISK                                                          Points  [  ] Previous VTE                                                3  [  ] Thrombophilia                                             2  [  ] Lower limb paralysis                                    2        (unable to hold up >15 seconds)    [  ] Current Cancer                                             2         (within 6 months)  [  ] Immobilization > 24 hrs                              1  [  ] ICU/CCU stay > 24 hours                            1  [  ] Age > 60                                                    1  IMPROVE VTE Score ____0_____

## 2020-09-10 NOTE — ED PROVIDER NOTE - EYES, MLM
Clear bilaterally, pupils equal, round and reactive to light. No sclera icterus bilaterally No photophobia bilaterally

## 2020-09-10 NOTE — ED PROVIDER NOTE - CONSTITUTIONAL, MLM
normal... Well appearing, awake, alert, oriented to person, place, time/situation and in no apparent distress. Speaking in clear full sentences + coffee ground vomitus

## 2020-09-10 NOTE — ED ADULT TRIAGE NOTE - CHIEF COMPLAINT QUOTE
Abd pain and vomiting with body aches, flushed skin says he's burning all over Abd pain and vomiting with body aches, flushed skin says he's burning all over, drives a taxi in [place]

## 2020-09-10 NOTE — H&P ADULT - PROBLEM SELECTOR PLAN 1
admit to tele as also ?withdrawal sz and also severity of withdrawals in past  [facility] protocol w/taper and prn as pt usualy has severe withdrawals  seizure precautions  mvi/folate/thaimine  encouraged cessation

## 2020-09-10 NOTE — H&P ADULT - NSHPSOURCEINFORD_GEN_ALL_CORE
Patient I personally performed the service described in the documentation recorded by the scribe in my presence, and it accurately and completely records my words and actions.

## 2020-09-11 DIAGNOSIS — F10.20 ALCOHOL DEPENDENCE, UNCOMPLICATED: ICD-10-CM

## 2020-09-11 DIAGNOSIS — F10.10 ALCOHOL ABUSE, UNCOMPLICATED: ICD-10-CM

## 2020-09-11 DIAGNOSIS — F05 DELIRIUM DUE TO KNOWN PHYSIOLOGICAL CONDITION: ICD-10-CM

## 2020-09-11 LAB
AMMONIA BLD-MCNC: 61 UMOL/L — HIGH (ref 11–32)
ANION GAP SERPL CALC-SCNC: 9 MMOL/L — SIGNIFICANT CHANGE UP (ref 5–17)
BACTERIA # UR AUTO: ABNORMAL
BUN SERPL-MCNC: 11 MG/DL — SIGNIFICANT CHANGE UP (ref 7–23)
CALCIUM SERPL-MCNC: 8.3 MG/DL — LOW (ref 8.5–10.1)
CHLORIDE SERPL-SCNC: 106 MMOL/L — SIGNIFICANT CHANGE UP (ref 96–108)
CO2 SERPL-SCNC: 26 MMOL/L — SIGNIFICANT CHANGE UP (ref 22–31)
CREAT SERPL-MCNC: 0.92 MG/DL — SIGNIFICANT CHANGE UP (ref 0.5–1.3)
CRP SERPL-MCNC: <0.1 MG/DL — SIGNIFICANT CHANGE UP (ref 0–0.4)
EPI CELLS # UR: SIGNIFICANT CHANGE UP
FERRITIN SERPL-MCNC: 139 NG/ML — SIGNIFICANT CHANGE UP (ref 30–400)
GLUCOSE SERPL-MCNC: 94 MG/DL — SIGNIFICANT CHANGE UP (ref 70–99)
LACTATE SERPL-SCNC: 1.7 MMOL/L — SIGNIFICANT CHANGE UP (ref 0.7–2)
MAGNESIUM SERPL-MCNC: 2.3 MG/DL — SIGNIFICANT CHANGE UP (ref 1.6–2.6)
MAGNESIUM SERPL-MCNC: 2.5 MG/DL — SIGNIFICANT CHANGE UP (ref 1.6–2.6)
PHOSPHATE SERPL-MCNC: 1.5 MG/DL — LOW (ref 2.5–4.5)
PHOSPHATE SERPL-MCNC: 2.5 MG/DL — SIGNIFICANT CHANGE UP (ref 2.5–4.5)
POTASSIUM SERPL-MCNC: 3.9 MMOL/L — SIGNIFICANT CHANGE UP (ref 3.5–5.3)
POTASSIUM SERPL-SCNC: 3.9 MMOL/L — SIGNIFICANT CHANGE UP (ref 3.5–5.3)
RBC CASTS # UR COMP ASSIST: SIGNIFICANT CHANGE UP /HPF (ref 0–4)
SODIUM SERPL-SCNC: 141 MMOL/L — SIGNIFICANT CHANGE UP (ref 135–145)
WBC UR QL: SIGNIFICANT CHANGE UP

## 2020-09-11 PROCEDURE — 90792 PSYCH DIAG EVAL W/MED SRVCS: CPT

## 2020-09-11 PROCEDURE — 99233 SBSQ HOSP IP/OBS HIGH 50: CPT

## 2020-09-11 PROCEDURE — 99291 CRITICAL CARE FIRST HOUR: CPT

## 2020-09-11 RX ORDER — ONDANSETRON 8 MG/1
4 TABLET, FILM COATED ORAL ONCE
Refills: 0 | Status: COMPLETED | OUTPATIENT
Start: 2020-09-11 | End: 2020-09-11

## 2020-09-11 RX ORDER — CHLORHEXIDINE GLUCONATE 213 G/1000ML
1 SOLUTION TOPICAL
Refills: 0 | Status: DISCONTINUED | OUTPATIENT
Start: 2020-09-11 | End: 2020-09-17

## 2020-09-11 RX ORDER — PHENOBARBITAL 60 MG
260 TABLET ORAL EVERY 6 HOURS
Refills: 0 | Status: DISCONTINUED | OUTPATIENT
Start: 2020-09-11 | End: 2020-09-15

## 2020-09-11 RX ORDER — HALOPERIDOL DECANOATE 100 MG/ML
5 INJECTION INTRAMUSCULAR ONCE
Refills: 0 | Status: COMPLETED | OUTPATIENT
Start: 2020-09-11 | End: 2020-09-11

## 2020-09-11 RX ORDER — ACETAMINOPHEN 500 MG
1000 TABLET ORAL ONCE
Refills: 0 | Status: COMPLETED | OUTPATIENT
Start: 2020-09-11 | End: 2020-09-11

## 2020-09-11 RX ORDER — MIDAZOLAM HYDROCHLORIDE 1 MG/ML
2 INJECTION, SOLUTION INTRAMUSCULAR; INTRAVENOUS ONCE
Refills: 0 | Status: DISCONTINUED | OUTPATIENT
Start: 2020-09-11 | End: 2020-09-11

## 2020-09-11 RX ORDER — DEXMEDETOMIDINE HYDROCHLORIDE IN 0.9% SODIUM CHLORIDE 4 UG/ML
0.2 INJECTION INTRAVENOUS
Qty: 200 | Refills: 0 | Status: DISCONTINUED | OUTPATIENT
Start: 2020-09-11 | End: 2020-09-12

## 2020-09-11 RX ORDER — SODIUM CHLORIDE 9 MG/ML
1000 INJECTION, SOLUTION INTRAVENOUS
Refills: 0 | Status: ACTIVE | OUTPATIENT
Start: 2020-09-11 | End: 2021-08-10

## 2020-09-11 RX ORDER — PHENOBARBITAL 60 MG
260 TABLET ORAL ONCE
Refills: 0 | Status: DISCONTINUED | OUTPATIENT
Start: 2020-09-11 | End: 2020-09-11

## 2020-09-11 RX ORDER — HALOPERIDOL DECANOATE 100 MG/ML
3 INJECTION INTRAMUSCULAR ONCE
Refills: 0 | Status: COMPLETED | OUTPATIENT
Start: 2020-09-11 | End: 2020-09-11

## 2020-09-11 RX ORDER — ACETAMINOPHEN 500 MG
650 TABLET ORAL EVERY 6 HOURS
Refills: 0 | Status: DISCONTINUED | OUTPATIENT
Start: 2020-09-11 | End: 2020-09-20

## 2020-09-11 RX ORDER — PANTOPRAZOLE SODIUM 20 MG/1
40 TABLET, DELAYED RELEASE ORAL
Refills: 0 | Status: DISCONTINUED | OUTPATIENT
Start: 2020-09-11 | End: 2020-09-20

## 2020-09-11 RX ADMIN — SODIUM CHLORIDE 125 MILLILITER(S): 9 INJECTION, SOLUTION INTRAVENOUS at 02:50

## 2020-09-11 RX ADMIN — Medication 1 TABLET(S): at 11:27

## 2020-09-11 RX ADMIN — Medication 1 MILLIGRAM(S): at 11:27

## 2020-09-11 RX ADMIN — Medication 2 MILLIGRAM(S): at 02:34

## 2020-09-11 RX ADMIN — CHLORHEXIDINE GLUCONATE 1 APPLICATION(S): 213 SOLUTION TOPICAL at 17:30

## 2020-09-11 RX ADMIN — Medication 400 MILLIGRAM(S): at 01:21

## 2020-09-11 RX ADMIN — Medication 2 MILLIGRAM(S): at 09:54

## 2020-09-11 RX ADMIN — ATORVASTATIN CALCIUM 20 MILLIGRAM(S): 80 TABLET, FILM COATED ORAL at 21:07

## 2020-09-11 RX ADMIN — MIDAZOLAM HYDROCHLORIDE 2 MILLIGRAM(S): 1 INJECTION, SOLUTION INTRAMUSCULAR; INTRAVENOUS at 14:10

## 2020-09-11 RX ADMIN — ONDANSETRON 4 MILLIGRAM(S): 8 TABLET, FILM COATED ORAL at 01:21

## 2020-09-11 RX ADMIN — DEXMEDETOMIDINE HYDROCHLORIDE IN 0.9% SODIUM CHLORIDE 3.76 MICROGRAM(S)/KG/HR: 4 INJECTION INTRAVENOUS at 15:52

## 2020-09-11 RX ADMIN — PANTOPRAZOLE SODIUM 40 MILLIGRAM(S): 20 TABLET, DELAYED RELEASE ORAL at 18:11

## 2020-09-11 RX ADMIN — Medication 62.5 MILLIMOLE(S): at 09:55

## 2020-09-11 RX ADMIN — Medication 2 MILLIGRAM(S): at 11:27

## 2020-09-11 RX ADMIN — Medication 100 MILLIGRAM(S): at 11:27

## 2020-09-11 RX ADMIN — Medication 25 MILLIGRAM(S): at 05:52

## 2020-09-11 RX ADMIN — Medication 260 MILLIGRAM(S): at 13:59

## 2020-09-11 RX ADMIN — HALOPERIDOL DECANOATE 5 MILLIGRAM(S): 100 INJECTION INTRAMUSCULAR at 20:01

## 2020-09-11 RX ADMIN — PANTOPRAZOLE SODIUM 10 MG/HR: 20 TABLET, DELAYED RELEASE ORAL at 02:51

## 2020-09-11 RX ADMIN — Medication 2 MILLIGRAM(S): at 05:52

## 2020-09-11 RX ADMIN — Medication 1000 MILLIGRAM(S): at 01:42

## 2020-09-11 RX ADMIN — Medication 260 MILLIGRAM(S): at 19:30

## 2020-09-11 RX ADMIN — Medication 2 MILLIGRAM(S): at 12:41

## 2020-09-11 RX ADMIN — Medication 260 MILLIGRAM(S): at 14:26

## 2020-09-11 RX ADMIN — HALOPERIDOL DECANOATE 3 MILLIGRAM(S): 100 INJECTION INTRAMUSCULAR at 12:20

## 2020-09-11 NOTE — CONSULT NOTE ADULT - ASSESSMENT
54 y/o male w/ ETOH abuse w/frequent admissions for alcohol withdrawal, GERD, HLD, alcoholic gastritis/esophagitis, PUD, hx of hypoxic respiratory failure requiring intubation due to aspiration PNA, presents with epigastric abdominal pain and coffee ground emesis, noted to have elevated lactate of 8.4 upon admission yesterday.  Pt admitted for alcohol withdrawal, UGIB, lactic acidosis.  ICU was consulted on 9/10/2020 for lactic acidosis, since resolved after IVF, was not requiring ICU level of care at that time.  Pt was then admitted to floors where CIWA protocol was started with ativan IVP taper.  Today, noted to have CIWA scores of 13-17.   ICU was consulted for DTs and worsening CIWA scores.    NEURO: will continue with CIWA protocol and monitor scores. continue phenobarb 260mg IVP q6 hours with 130mg IVP PRN CIWA >15. start precedex, goal RASS 0 to -1. continue thiamine, folate, multivitamin.    RESP: tolerating room air, may consider end-tidal monitoring if respirations decrease 2/2 sedatives, continue to monitor for now    CV: cont home meds, metoprolol 25mg TID - monitor BP    GI: per charts with coffee ground emesis PTA, continue with protonix gtt for now, GI consult (Huma) appreciated - possible EGD?, will need to follow up with GI    ID: afebrile, no leukocytosis, continue to monitor    PPX: SCDs given possible GIB    DISPO: ICU 54 y/o male w/ ETOH abuse w/frequent admissions for alcohol withdrawal, GERD, HLD, alcoholic gastritis/esophagitis, PUD, hx of hypoxic respiratory failure requiring intubation due to aspiration PNA, presents with epigastric abdominal pain and coffee ground emesis, noted to have elevated lactate of 10.9 upon admission yesterday.  Pt admitted for alcohol withdrawal, UGIB, lactic acidosis.  ICU was consulted on 9/10/2020 for lactic acidosis, since resolved after IVF, was not requiring ICU level of care at that time.  Pt was then admitted to floors where CIWA protocol was started with ativan IVP taper.  Today, noted to have CIWA scores of 13-17.   ICU was consulted for DTs and worsening CIWA scores.    NEURO: will continue with CIWA protocol and monitor scores. continue phenobarb 260mg IVP q6 hours with 130mg IVP PRN CIWA >15. start precedex, goal RASS 0 to -1. continue thiamine, folate, multivitamin.    RESP: tolerating room air, may consider end-tidal monitoring if respirations decrease 2/2 sedatives, continue to monitor for now    CV: cont home meds, metoprolol 25mg TID - monitor BP    GI: per charts with coffee ground emesis PTA, continue with protonix gtt for now, GI consult (Huma) appreciated - possible EGD?, will need to follow up with GI    RENAL: noted to have ROSA to 1.49 on initial labs upon admission, improved to 1.08 after IVF today, baseline appears to be around 0.8 - continue to monitor BMP    ID: initially with lactic acidosis likely 2/2 alcoholic ketoacidosis, now resolved after IVF. afebrile, no leukocytosis, monitor off abx at this time    GEN: hypophosphatemia, will replete     PPX: SCDs given possible GIB    DISPO: ICU 56 y/o male w/ ETOH abuse w/frequent admissions for alcohol withdrawal, GERD, HLD, alcoholic gastritis/esophagitis, PUD, hx of hypoxic respiratory failure requiring intubation due to aspiration PNA, presents with epigastric abdominal pain and coffee ground emesis, noted to have elevated lactate of 10.9 upon admission yesterday.  Pt admitted for alcohol withdrawal, UGIB, lactic acidosis.  ICU was consulted on 9/10/2020 for lactic acidosis, since resolved after IVF, was not requiring ICU level of care at that time.  Pt was then admitted to floors where CIWA protocol was started with ativan IVP taper.  Today, noted to have CIWA scores of 13-17.   ICU was consulted for DTs and worsening CIWA scores.    NEURO: will continue with CIWA protocol and monitor scores. continue phenobarb 260mg IVP q6 hours with 130mg IVP PRN CIWA >15. start precedex, goal RASS 0 to -1. continue thiamine, folate, multivitamin.    RESP: tolerating room air, may consider end-tidal monitoring if respirations decrease 2/2 sedatives, continue to monitor for now    CV: home med metoprolol 25mg TID, would hold for now given GIB - can give IVP PRN if necessary    GI: per charts with coffee ground emesis PTA, continue with protonix gtt for now, GI consult (Huma) appreciated - possible EGD?, will need to follow up with GI    RENAL: noted to have ROSA to 1.49 on initial labs upon admission, improved to 1.08 after IVF today, baseline appears to be around 0.8 - continue to monitor BMP    ID: initially with lactic acidosis likely 2/2 alcoholic ketoacidosis, now resolved after IVF. afebrile, no leukocytosis, monitor off abx at this time    GEN: hypophosphatemia, will replete     PPX: SCDs given possible GIB    DISPO: ICU

## 2020-09-11 NOTE — BEHAVIORAL HEALTH ASSESSMENT NOTE - NSBHCONSULTMEDS_PSY_A_CORE FT
try PO Librium taper protocol and bridge with PRNs; seems to be confused on IVP Ativan try symptom triggered CIWA protocol and bridge with PRNs; seems to be confused on IVP Ativan and may not need it standing

## 2020-09-11 NOTE — BEHAVIORAL HEALTH ASSESSMENT NOTE - DESCRIPTION
GERD, HLD, alcoholic gastritis/esophagitis, PUD, hx of hypoxic respiratory failure requiring intubation due to aspiration PNA,

## 2020-09-11 NOTE — PROGRESS NOTE ADULT - SUBJECTIVE AND OBJECTIVE BOX
Patient is a 55y old  Male who presents with a chief complaint of abdominal pain (10 Sep 2020 20:33)      OVERNIGHT EVENTS: ciwa 4    REVIEW OF SYSTEMS: denies chest pain/SOB, diaphoresis, no F/C, cough, dizziness, headache, blurry vision, nausea, vomiting, abdominal pain. All others review of systems negative     MEDICATIONS  (STANDING):  atorvastatin 20 milliGRAM(s) Oral at bedtime  chlordiazePOXIDE 25 milliGRAM(s) Oral once  folic acid 1 milliGRAM(s) Oral daily  haloperidol    Injectable 3 milliGRAM(s) IV Push once  lactated ringers. 1000 milliLiter(s) (125 mL/Hr) IV Continuous <Continuous>  LORazepam   Injectable   IV Push   LORazepam   Injectable 2 milliGRAM(s) IV Push every 4 hours  LORazepam   Injectable 1.5 milliGRAM(s) IV Push every 4 hours  metoprolol tartrate 25 milliGRAM(s) Oral three times a day  multivitamin 1 Tablet(s) Oral daily  pantoprazole Infusion 8 mG/Hr (10 mL/Hr) IV Continuous <Continuous>  thiamine 100 milliGRAM(s) Oral daily    MEDICATIONS  (PRN):  LORazepam   Injectable 2 milliGRAM(s) IV Push every 1 hour PRN CIWA-Ar score 8 or greater      Allergies    No Known Allergies    Intolerances        T(F): 97.6 (20 @ 11:14), Max: 99 (09-10-20 @ 14:40)  HR: 87 (20 @ 11:14) (70 - 135)  BP: 142/79 (20 @ 11:14) (111/67 - 146/89)  RR: 18 (20 @ 11:14) (17 - 19)  SpO2: 97% (20 @ 11:14) (95% - 97%)  Wt(kg): --    PHYSICAL EXAM:  GENERAL: NAD  HEAD:  Atraumatic, Normocephalic  EYES: PERRLA, conjunctiva and sclera clear  ENMT: Moist mucous membranes  NECK: Supple, No JVD, Normal thyroid  NERVOUS SYSTEM:  Alert & Awake  CHEST/LUNG: Clear to percussion bilaterally;   HEART: Regular rate and rhythm;   ABDOMEN: Soft, Nontender, Nondistended; Bowel sounds present  EXTREMITIES:  no edema BL LE  SKIN: moist    LABS:                        14.1   9.77  )-----------( 288      ( 10 Sep 2020 15:31 )             42.1     09-10    140  |  105  |  16  ----------------------------<  89  4.3   |  17<L>  |  1.08    Ca    7.5<L>      10 Sep 2020 18:24  Phos  1.5     09-11  Mg     2.3     09-11    TPro  9.5<H>  /  Alb  4.4  /  TBili  0.7  /  DBili  x   /  AST  51<H>  /  ALT  32  /  AlkPhos  60  09-10    PT/INR - ( 10 Sep 2020 15:31 )   PT: 11.6 sec;   INR: 1.00 ratio         PTT - ( 10 Sep 2020 15:31 )  PTT:26.5 sec  Urinalysis Basic - ( 10 Sep 2020 23:53 )    Color: Yellow / Appearance: Clear / S.020 / pH: x  Gluc: x / Ketone: Moderate  / Bili: Negative / Urobili: Negative mg/dL   Blood: x / Protein: 30 mg/dL / Nitrite: Negative   Leuk Esterase: Negative / RBC: 0-2 /HPF / WBC 0-2   Sq Epi: x / Non Sq Epi: Occasional / Bacteria: Occasional      Cultures;   CAPILLARY BLOOD GLUCOSE        Lipid panel:     CARDIAC MARKERS ( 10 Sep 2020 15:31 )  <.015 ng/mL / x     / x     / x     / x            RADIOLOGY & ADDITIONAL TESTS:    Imaging Personally Reviewed:  [x ] YES      Consultant(s) Notes Reviewed:  [x ] YES     Care Discussed with [x ] Consultants [X ] Patient [ ] Family  [x ]    [x ]  Other; RN

## 2020-09-11 NOTE — BEHAVIORAL HEALTH ASSESSMENT NOTE - NSBHCHARTREVIEWIMAGING_PSY_A_CORE FT
CT abdomen Mildly distended fluid-filled stomach secondary to gastroparesis or partial gastric outlet obstruction. Nonspecific ascending colitis. Small sliding hiatus hernia with thickened distal esophagus. Recommendendoscopy. Distended urinary bladder. Hepatic steatosis. CT abdomen Mildly distended fluid-filled stomach secondary to gastroparesis or partial gastric outlet obstruction. Nonspecific ascending colitis. Small sliding hiatus hernia with thickened distal esophagus. Recommend endoscopy. Distended urinary bladder. Hepatic steatosis.

## 2020-09-11 NOTE — BEHAVIORAL HEALTH ASSESSMENT NOTE - RISK ASSESSMENT
Unable to determine Suicide Risk to be determined once able to tolerate full psych eval; chronic risk factors are male gender and alcohol/substance abuse

## 2020-09-11 NOTE — BEHAVIORAL HEALTH ASSESSMENT NOTE - OTHER
superficially cooperative tremors not noted deferred at this time does not verbally engage with Writer does not verbalize looks confused unable to ascertain due to limitation of exam does not seem to be responding ot internal stimuli see HPI factors to be ascertain once Patient is able to tolerate full psych assessment

## 2020-09-11 NOTE — BEHAVIORAL HEALTH ASSESSMENT NOTE - NSBHCONSULTMEDSEVERE_PSY_A_CORE FT
haldol 3mg IVP q6hrs PRN for safety secondary to disorganized behavior (ie trying to leave, stand while fall risk) QTc is within normal limits

## 2020-09-11 NOTE — BEHAVIORAL HEALTH ASSESSMENT NOTE - NSBHCHARTREVIEWVS_PSY_A_CORE FT
T(C): 36.4 (09-11-20 @ 11:14), Max: 37.2 (09-10-20 @ 14:40)  HR: 87 (09-11-20 @ 11:14) (70 - 135)  BP: 142/79 (09-11-20 @ 11:14) (111/67 - 146/89)  RR: 18 (09-11-20 @ 11:14) (17 - 19)  SpO2: 97% (09-11-20 @ 11:14) (95% - 97%)
Yes

## 2020-09-11 NOTE — CONSULT NOTE ADULT - ATTENDING COMMENTS
Pt seen and examined at RRT and on arrival to ICU    55M PMH ETOH abuse/dependence w/frequent admissions for alcohol withdrawal, GERD, HLD, alcoholic gastritis/esophagitis, PUD, hx of hypoxic respiratory failure requiring intubation due to aspiration PNA, most recently intubated April 2020 for hypoxic resp failure secondary to asp PNA with course complicated by septic shock and ROSA presents again for abdominal pain and coffee ground emesis. Active drinker daily drinker (Vodka). admitted to medical floor for alcoholic gastritis with hemorrhage, alcohol intoxication, lactic acidosis (likely due to component of starvation ketoacidosis, which resolved with IVF hydration). Course now complicated by severe agitation, delirium requiring transfer to ICU for sedation and airway monitoring.     - pt unable to provide hx at present time: is restless, climbing out of bed, reverting back to native language (not speaking english - at baseline can communicate in english), talking to and answering someone who is not in the room, ?fromication as he keeps rubbing face and scratching bilateral arms and thighs as well as his head.   - s/p phenobarbital 260 mG IVP x2 and started on precedex drip before agitation resolved  - will cont standing phenobarbital  - MVI/thiamine/folic acid  - IVF hydration  - protonix drip  - monitor H/H, no further reports of coffee ground emesis since admission  - NPO  - GI eval: hx of gastritis on prior EGD  - monitor electrolytes  - will need additional IV access to be established    Critical Care Time: 60 min

## 2020-09-11 NOTE — PROGRESS NOTE ADULT - ASSESSMENT
PI:    · Chief Complaint: The patient is a 55y Male complaining of abdominal pain.	  · HPI Objective Statement: 55 years old male by ems c/o epigastric and mid abd pain vomiting coffee ground since yesterday and c/o feeling hot all over his body. Pt sts he drinks alcohol three to four times a week and last drink was 4 days ago. Pt denies headache, dizziness, blurred visions, light sensitivities, focal/distal weakness or numbness, cough, sob, chest pain, dysuria, or irregular bowel movements.	  ----------------- As Above ---------------------- Seen earlier today  Patient is a poor historian. Patient states that he has been having coffee ground emesis over the past 3 - 4 days. No blood but very dark. ETOH abuse (+) Upper abdominal pain. No melena.  Denies NSAIDs.   Patient has been admitted for the same reason multiple times over he past few years. Last EGD was 5/ 2020- gastritis.   See labs  / CT scan      Upper GI bleed - Probably alcoholic gastritis Stable  -1) IV PPI  2) NPO  3) f/u labs 4) ?EGD 5) Zofran PRN

## 2020-09-11 NOTE — PROGRESS NOTE ADULT - SUBJECTIVE AND OBJECTIVE BOX
Patient is a 55y old  Male who presents with a chief complaint of abdominal pain (11 Sep 2020 14:10)      HPI:  Pt is a 56 y/o male w/ ETOH abuse w/frequent admissions,  GERD, HLD, alcoholic gastritis/esophagitis, PUD, hx of hypoxic respiratory failure requiring intubation due to aspiration PNA comes w/abdominal pain mostly in epigastric area and also reports emesis states coffee ground color.  Pt was last admitted 2 months ago for abdominal pains.  Reports pain mostly in epigastric area, worse w/eating for last 4 days w/decreased po intake and mostly just drinking as a result over this time. Usually drinks vodka almost a bottle on regular basis. no fever, chills, sob, cp, palpitaions, +n/+v/-d/-c no travesl or sick contacts. (10 Sep 2020 20:33)      INTERVAL HPI/OVERNIGHT EVENTS:  Chart reviewed  Now sedated ( was terribly agitated ) The nurse denies melena, hematochezia, hematemesis, nausea, vomiting, abdominal pain, constipation, diarrhea, or change in bowel movements     MEDICATIONS  (STANDING):  atorvastatin 20 milliGRAM(s) Oral at bedtime  chlorhexidine 4% Liquid 1 Application(s) Topical <User Schedule>  dexMEDEtomidine Infusion 0.2 MICROgram(s)/kG/Hr (3.76 mL/Hr) IV Continuous <Continuous>  folic acid 1 milliGRAM(s) Oral daily  lactated ringers. 1000 milliLiter(s) (100 mL/Hr) IV Continuous <Continuous>  multivitamin 1 Tablet(s) Oral daily  pantoprazole  Injectable 40 milliGRAM(s) IV Push two times a day  PHENobarbital Injectable 260 milliGRAM(s) IV Push every 6 hours  thiamine 100 milliGRAM(s) Oral daily    MEDICATIONS  (PRN):  acetaminophen   Tablet .. 650 milliGRAM(s) Oral every 6 hours PRN Temp greater or equal to 38C (100.4F), Mild Pain (1 - 3)      FAMILY HISTORY:  No pertinent family history in first degree relatives (Father, Mother)      Allergies    No Known Allergies    Intolerances        PMH/PSH:  DTs (delirium tremens)  Substance abuse  Gastritis  EtOH dependence  Mixed hyperlipidemia  PUD (peptic ulcer disease)  Alcohol abuse  No significant past surgical history        REVIEW OF SYSTEMS:  CONSTITUTIONAL: No fever, weight loss,   EYES: No eye pain, visual disturbances, or discharge  ENMT:  No difficulty hearing, tinnitus, vertigo; No sinus or throat pain  NECK: No pain or stiffness  BREASTS: No pain, masses, or nipple discharge  RESPIRATORY: No cough, wheezing, chills or hemoptysis; No shortness of breath  CARDIOVASCULAR: No chest pain, palpitations, dizziness, or leg swelling  GASTROINTESTINAL: see above   GENITOURINARY: No dysuria, frequency, hematuria, or incontinence  NEUROLOGICAL: No headaches, , numbness, or tremors  SKIN: No itching, burning, rashes, or lesions   LYMPH NODES: No enlarged glands  ENDOCRINE: No heat or cold intolerance; No hair loss  MUSCULOSKELETAL: No joint pain or swelling; No muscle, back, or extremity pain  PSYCHIATRIC: No depression, anxiety, mood swings, or difficulty sleeping  HEME/LYMPH: No easy bruising, or bleeding gums  ALLERGY AND IMMUNOLOGIC: No hives or eczema    Vital Signs Last 24 Hrs  T(C): 37.1 (11 Sep 2020 14:02), Max: 37.1 (11 Sep 2020 00:05)  T(F): 98.7 (11 Sep 2020 14:02), Max: 98.8 (11 Sep 2020 00:05)  HR: 52 (11 Sep 2020 15:30) (52 - 102)  BP: 110/73 (11 Sep 2020 15:30) (110/73 - 142/79)  BP(mean): 82 (11 Sep 2020 15:30) (81 - 89)  RR: 17 (11 Sep 2020 15:30) (16 - 22)  SpO2: 99% (11 Sep 2020 15:30) (95% - 99%)    PHYSICAL EXAM:  GENERAL: NAD, well-groomed, well-developed  HEAD:  Atraumatic, Normocephalic  EYES: EOMI, PERRLA, conjunctiva and sclera clear  NECK: Supple, No JVD, Normal thyroid  NERVOUS SYSTEM:  Sedated  CHEST/LUNG: Clear to percussion bilaterally; No rales, rhonchi, wheezing, or rubs  HEART: Regular rate and rhythm; No murmurs, rubs, or gallops  ABDOMEN: Soft, Nontender, Nondistended; Bowel sounds present  EXTREMITIES:  2+ Peripheral Pulses, No clubbing, cyanosis, or edema  LYMPH: No lymphadenopathy noted  SKIN: No rashes or lesions    LAB  09-10 @ 15:31  amylase 144   lipase 125                           14.1   9.77  )-----------( 288      ( 10 Sep 2020 15:31 )             42.1       CBC:  09-10 @ 15:31  WBC 9.77   Hgb 14.1   Hct 42.1   Plts 288  MCV 84.2      Chemistry:  09-10 @ 18:24  Na+ 140  K+ 4.3  Cl- 105  CO2 17  BUN 16  Cr 1.08     09-10 @ 15:31  Na+ 137  K+ 3.6  Cl- 93  CO2 19  BUN 19  Cr 1.41         Glucose, Serum: 89 mg/dL (09-10 @ 18:24)  Glucose, Serum: 101 mg/dL (09-10 @ 15:31)      10 Sep 2020 18:24    140    |  105    |  16     ----------------------------<  89     4.3     |  17     |  1.08   10 Sep 2020 15:31    137    |  93     |  19     ----------------------------<  101    3.6     |  19     |  1.41     Ca    7.5        10 Sep 2020 18:24  Ca    9.0        10 Sep 2020 15:31  Phos  1.5       11 Sep 2020 08:14  Phos  2.4       10 Sep 2020 22:19  Mg     2.3       11 Sep 2020 08:14  Mg     2.3       10 Sep 2020 22:19  Mg     1.5       10 Sep 2020 18:24    TPro  9.5    /  Alb  4.4    /  TBili  0.7    /  DBili  x      /  AST  51     /  ALT  32     /  AlkPhos  60     10 Sep 2020 15:31      PT/INR - ( 10 Sep 2020 15:31 )   PT: 11.6 sec;   INR: 1.00 ratio         PTT - ( 10 Sep 2020 15:31 )  PTT:26.5 sec    Urinalysis Basic - ( 10 Sep 2020 23:53 )    Color: Yellow / Appearance: Clear / S.020 / pH: x  Gluc: x / Ketone: Moderate  / Bili: Negative / Urobili: Negative mg/dL   Blood: x / Protein: 30 mg/dL / Nitrite: Negative   Leuk Esterase: Negative / RBC: 0-2 /HPF / WBC 0-2   Sq Epi: x / Non Sq Epi: Occasional / Bacteria: Occasional        CAPILLARY BLOOD GLUCOSE          C-Reactive Protein, Serum: <0.10 mg/dL (09-10 @ 20:28)      RADIOLOGY & ADDITIONAL TESTS:    Imaging Personally Reviewed:  [ ] YES  [ ] NO    Consultant(s) Notes Reviewed:  [ ] YES  [ ] NO    Care Discussed with Consultants/Other Providers [ ] YES  [ ] NO

## 2020-09-11 NOTE — BEHAVIORAL HEALTH ASSESSMENT NOTE - NSBHCHARTREVIEWLAB_PSY_A_CORE FT
09-10    140  |  105  |  16  ----------------------------<  89  4.3   |  17<L>  |  1.08    Ca    7.5<L>      10 Sep 2020 18:24  Phos  1.5     09-11  Mg     2.3     09-11    TPro  9.5<H>  /  Alb  4.4  /  TBili  0.7  /  DBili  x   /  AST  51<H>  /  ALT  32  /  AlkPhos  60  09-10

## 2020-09-11 NOTE — BEHAVIORAL HEALTH ASSESSMENT NOTE - HPI (INCLUDE ILLNESS QUALITY, SEVERITY, DURATION, TIMING, CONTEXT, MODIFYING FACTORS, ASSOCIATED SIGNS AND SYMPTOMS)
56 yo [place]   male, lives with his son in a private home, works as a , with long history fo continuous Alcohol Abuse (at most reports 2-3 bottles of vodka 1-2/day), with associated numerous ED visits and hospital admissions (ie. epigastric pain, vomiting, nausea, hematemesis, aspiration pneumonia), 54 yo [place]   male, lives with his son in a private home, works as a , with long history fo continuous Alcohol Abuse (at most reports 2-3 bottles of vodka 1-2/day), with associated numerous ED visits and hospital admissions (ie. epigastric pain, vomiting, nausea, hematemesis, aspiration pneumonia, esophageal ulcer, UGI bleed) presenting with alcohol intoxication ( yesterday afternoon), and c/o "Abd pain and vomiting with body aches." Patient reported to ED that his "last drink was 4 days ago" yet his BAL was 223.  Patient stated that he has been having coffee ground emesis over the past 3 - 4 days; GI following.     EXAM: Patient is sitting on the side of his bed trying to use the bedside phone to make a phone call. He is disorganized and confused, and keeps pushing the "1, 2, 3" phone buttons over and over again. Not even putting received near his ear and blankly staring at it. Staff at bedside asks if they can assist calling a number which he is not able to cooperate with. Patient does not make eye contact with Writer despite numerous attempts, does not verbally engage and remains preoccupied with the phone buttons. He is heavy lidded with slowed movements consistent with being on IVP Ativan. As per [facility] staff, Patient has been restless, intermittently agitated, trying to get up and leave saying "nothing is wrong with me," he makes illogical statements and looks/acts confused.     ISTOP Reference #:[id number]   04/14/2020 04/15/2020 chlordiazepoxide 25 mg capsule  20 5 Hailey Garrett A, Medicaid [facility] #[id number]

## 2020-09-11 NOTE — BEHAVIORAL HEALTH ASSESSMENT NOTE - NSBHCONSULTRECOMMENDOTHER_PSY_A_CORE FT
recommend daily CBC to follow Hg/Ht  - Pt has esophageal ulcer, hx of UGI bleed and coming in with coffee ground emesis over the past 3 - 4 days

## 2020-09-11 NOTE — BEHAVIORAL HEALTH ASSESSMENT NOTE - SUMMARY
acute delirium acute delirium  - presented with "intoxication" and not withdrawal   - vitals have been stable thus far, no clinical signs of severe withdrawal sxs (granted on IVP Ativan)  - could try PO Librium taper as opposed to IVP Ativan acute delirium  - presented with "intoxication" and not withdrawal   - vitals have been stable thus far, no clinical signs of severe withdrawal sxs (granted on IVP Ativan)  - could try symptom-triggered CIWA protocol as opposed to IVP Ativan  - NO capacity to leave AMA/make his medical decisions acute delirium  - presented with "intoxication" and not withdrawal. He was still intoxicated yesterday at 3:30pm; severe withdrawal symptoms usually occur 2-3 days after last drink   - vitals have been stable thus far, no clinical signs of severe withdrawal sxs (granted on IVP Ativan)  - could try symptom-triggered CIWA protocol as opposed to IVP Ativan  - NO capacity to leave AMA/make his medical decisions

## 2020-09-11 NOTE — PROGRESS NOTE ADULT - ASSESSMENT
55 years old male by ems c/o epigastric and mid abd pain vomiting coffee ground and c/o feeling hot all over his body. Pt sts he drinks alcohol three to four times a week and last drink was 4 days ago. Pt denies headache, dizziness, blurred visions, light sensitivities, focal/distal weakness or numbness, cough, sob, chest pain, dysuria, or irregular bowel movements. Patient has been admitted for the same reason multiple times over he past few years. Last EGD was 5/ 2020- gastritis.     1.	Acute alcohol withdrawal in a patient with hx of chronic alcohol dependence and ongoing alcohol abuse. Multiple hospitalizations. at high risk for DTs and/or severe withdrawal. Given phenobarbitone x 1 and  now on scheduled IV LORazepam with holding parameters. cont iv ativan prn. cont CIWA protocol. cont aspiration, seizure and fall precautions. follow labs. cont IVF. telemonitoring. Psych eval  2.	Upper GI bleed; IV PPI, NPO, f/u labs, Zofran PRN. Azeem Finn (MD)  3.	chronic alcoholic hepatitis. advised quitting alcohol.   4.	Chronic low back pain. cont tylenol prn.     DVT Ppx: SCDs  Full Code. 55 years old male by ems c/o epigastric and mid abd pain vomiting coffee ground and c/o feeling hot all over his body. Pt sts he drinks alcohol three to four times a week and last drink was 4 days ago. Pt denies headache, dizziness, blurred visions, light sensitivities, focal/distal weakness or numbness, cough, sob, chest pain, dysuria, or irregular bowel movements. Patient has been admitted for the same reason multiple times over he past few years. Last EGD was 5/ 2020- gastritis.     1.	Acute alcohol intoxication in a patient with hx of chronic alcohol dependence and ongoing alcohol abuse. Multiple hospitalizations. at high risk for DTs and/or severe withdrawal. Given phenobarbitone x 1 and  now on scheduled IV LORazepam with holding parameters. cont iv ativan prn. cont CIWA protocol. cont aspiration, seizure and fall precautions. follow labs. cont IVF. telemonitoring. Psych eval  2.	Upper GI bleed; IV PPI, NPO, f/u labs, Zofran PRN. Azeem Finn (MD)  3.	hypomagnesemia; lyte replaced, monitor   4.	hypophosphetemia; as above  5.	lactic acidosis from alcohol; now trending down, on IVF.   6.	chronic alcoholic hepatitis. advised quitting alcohol.   7.	Chronic low back pain. cont tylenol prn.     DVT Ppx: SCDs  Full Code. 55 years old male by ems c/o epigastric and mid abd pain vomiting coffee ground and c/o feeling hot all over his body. Pt sts he drinks alcohol three to four times a week and last drink was 4 days ago. Pt denies headache, dizziness, blurred visions, light sensitivities, focal/distal weakness or numbness, cough, sob, chest pain, dysuria, or irregular bowel movements. Patient has been admitted for the same reason multiple times over he past few years. Last EGD was 5/ 2020- gastritis.     1.	Acute alcohol intoxication in a patient with hx of chronic alcohol dependence and ongoing alcohol abuse. Multiple hospitalizations. at high risk for DTs and/or severe withdrawal. Given phenobarbitone x 1 and  now on scheduled IV LORazepam with holding parameters. cont iv ativan prn. cont CIWA protocol. cont aspiration, seizure and fall precautions. follow labs. cont IVF. telemonitoring. Psych eval  2.	acute metabolic encephalopathy due to above; monitor on mental status   3.	Upper GI bleed; IV PPI, NPO, f/u labs, Zofran PRN. Azeem Finn (MD)  4.	hypomagnesemia; lyte replaced, monitor   5.	hypophosphetemia; as above  6.	lactic acidosis from alcohol; now trending down, on IVF.   7.	chronic alcoholic hepatitis. advised quitting alcohol.   8.	Chronic low back pain. cont tylenol prn.     DVT Ppx: SCDs  Full Code.

## 2020-09-11 NOTE — CONSULT NOTE ADULT - SUBJECTIVE AND OBJECTIVE BOX
Patient is a 55y old  Male who presents with a chief complaint of abdominal pain (11 Sep 2020 11:54)    56 y/o male w/ ETOH abuse w/frequent admissions for alcohol withdrawal, GERD, HLD, alcoholic gastritis/esophagitis, PUD, hx of hypoxic respiratory failure requiring intubation due to aspiration PNA, presents with epigastric abdominal pain and coffee ground emesis, noted to have elevated lactate of 8.4 upon admission yesterday.  Pt admitted for alcohol withdrawal, UGIB, lactic acidosis.  ICU was consulted on 9/10/2020 for lactic acidosis, since resolved after IVF, was not requiring ICU level of care at that time.  Pt was then admitted to floors where CIWA protocol was started with ativan IVP taper.  Today, noted to have CIWA scores of 13-17. ICU was consulted for DTs and worsening CIWA scores.    Allergies  No Known Allergies      MEDICATIONS  (STANDING):  atorvastatin 20 milliGRAM(s) Oral at bedtime  chlorhexidine 4% Liquid 1 Application(s) Topical <User Schedule>  dexMEDEtomidine Infusion 0.2 MICROgram(s)/kG/Hr (3.76 mL/Hr) IV Continuous <Continuous>  folic acid 1 milliGRAM(s) Oral daily  lactated ringers. 1000 milliLiter(s) (125 mL/Hr) IV Continuous <Continuous>  metoprolol tartrate 25 milliGRAM(s) Oral three times a day  multivitamin 1 Tablet(s) Oral daily  pantoprazole Infusion 8 mG/Hr (10 mL/Hr) IV Continuous <Continuous>  thiamine 100 milliGRAM(s) Oral daily    MEDICATIONS  (PRN):  acetaminophen   Tablet .. 650 milliGRAM(s) Oral every 6 hours PRN Temp greater or equal to 38C (100.4F), Mild Pain (1 - 3)    Daily Height in cm: 170.18 (11 Sep 2020 00:05)    Daily Weight in k.6 (11 Sep 2020 05:23)    Drug Dosing Weight  Height (cm): 170.2 (11 Sep 2020 00:05)  Weight (kg): 75.2 (11 Sep 2020 00:05)  BMI (kg/m2): 26 (11 Sep 2020 00:05)  BSA (m2): 1.87 (11 Sep 2020 00:05)    PAST MEDICAL & SURGICAL HISTORY:  DTs (delirium tremens)  Substance abuse  Gastritis  EtOH dependence  Mixed hyperlipidemia  PUD (peptic ulcer disease)  No significant past surgical history    FAMILY HISTORY:  No pertinent family history in first degree relatives (Father, Mother)    REVIEW OF SYSTEMS:  CONSTITUTIONAL: No fever, weight loss, or fatigue  EYES: No eye pain, visual disturbances, or discharge  ENMT:  No difficulty hearing, tinnitus, vertigo; No sinus or throat pain  NECK: No pain or stiffness  BREASTS: No pain, masses, or nipple discharge  RESPIRATORY: No cough, wheezing, chills or hemoptysis; No shortness of breath  CARDIOVASCULAR: No chest pain, palpitations, dizziness, or leg swelling  GASTROINTESTINAL: No abdominal or epigastric pain. No nausea, vomiting, or hematemesis; No diarrhea or constipation. No melena or hematochezia.  GENITOURINARY: No dysuria, frequency, hematuria, or incontinence  NEUROLOGICAL: No headaches, memory loss, loss of strength, numbness, or tremors  SKIN: No itching, burning, rashes, or lesions   LYMPH NODES: No enlarged glands  ENDOCRINE: No heat or cold intolerance; No hair loss  MUSCULOSKELETAL: No joint pain or swelling; No muscle, back, or extremity pain  PSYCHIATRIC: No depression, anxiety, mood swings, or difficulty sleeping  HEME/LYMPH: No easy bruising, or bleeding gums  ALLERGY AND IMMUNOLOGIC: No hives or eczema    ICU Vital Signs Last 24 Hrs  T(C): 37.1 (11 Sep 2020 14:02), Max: 37.2 (10 Sep 2020 14:40)  T(F): 98.7 (11 Sep 2020 14:02), Max: 99 (10 Sep 2020 14:40)  HR: 81 (11 Sep 2020 14:02) (70 - 135)  BP: 136/79 (11 Sep 2020 14:02) (111/67 - 146/89)  BP(mean): --  ABP: --  ABP(mean): --  RR: 20 (11 Sep 2020 14:02) (17 - 20)  SpO2: 98% (11 Sep 2020 14:02) (95% - 98%)      ABG - ( 10 Sep 2020 17:46 )  pH, Arterial: x     pH, Blood: 7.32  /  pCO2: 36    /  pO2: 83    / HCO3: 18    / Base Excess: -7.0  /  SaO2: 94        I&O's Detail    10 Sep 2020 07:01  -  11 Sep 2020 07:00  --------------------------------------------------------  IN:    Lactated Ringers IV Bolus: 2500 mL    Oral Fluid: 250 mL    pantoprazole Infusion: 40 mL    Sodium Chloride 0.9% IV Bolus: 2000 mL  Total IN: 4790 mL    OUT:  Total OUT: 0 mL    Total NET: 4790 mL      LABS:  CBC Full  -  ( 10 Sep 2020 15:31 )  WBC Count : 9.77 K/uL  RBC Count : 5.00 M/uL  Hemoglobin : 14.1 g/dL  Hematocrit : 42.1 %  Platelet Count - Automated : 288 K/uL  Mean Cell Volume : 84.2 fl  Mean Cell Hemoglobin : 28.2 pg  Mean Cell Hemoglobin Concentration : 33.5 gm/dL  Auto Neutrophil # : 8.29 K/uL  Auto Lymphocyte # : 1.09 K/uL  Auto Monocyte # : 0.29 K/uL  Auto Eosinophil # : 0.00 K/uL  Auto Basophil # : 0.06 K/uL  Auto Neutrophil % : 84.8 %  Auto Lymphocyte % : 11.2 %  Auto Monocyte % : 3.0 %  Auto Eosinophil % : 0.0 %  Auto Basophil % : 0.6 %    09-10    140  |  105  |  16  ----------------------------<  89  4.3   |  17<L>  |  1.08    Ca    7.5<L>      10 Sep 2020 18:24  Phos  1.5     09-11  Mg     2.3     09-11    TPro  9.5<H>  /  Alb  4.4  /  TBili  0.7  /  DBili  x   /  AST  51<H>  /  ALT  32  /  AlkPhos  60  09-10  PT/INR - ( 10 Sep 2020 15:31 )   PT: 11.6 sec;   INR: 1.00 ratio    PTT - ( 10 Sep 2020 15:31 )  PTT:26.5 sec  Urinalysis Basic - ( 10 Sep 2020 23:53 )    Color: Yellow / Appearance: Clear / S.020 / pH: x  Gluc: x / Ketone: Moderate  / Bili: Negative / Urobili: Negative mg/dL   Blood: x / Protein: 30 mg/dL / Nitrite: Negative   Leuk Esterase: Negative / RBC: 0-2 /HPF / WBC 0-2   Sq Epi: x / Non Sq Epi: Occasional / Bacteria: Occasional      CARDIAC MARKERS ( 10 Sep 2020 15:31 )  <.015 ng/mL / x     / x     / x     / x Patient is a 55y old  Male who presents with a chief complaint of abdominal pain (11 Sep 2020 11:54)    56 y/o male w/ ETOH abuse w/frequent admissions for alcohol withdrawal, GERD, HLD, alcoholic gastritis/esophagitis, PUD, hx of hypoxic respiratory failure requiring intubation due to aspiration PNA, presents with epigastric abdominal pain and coffee ground emesis, noted to have elevated lactate of 10.9 upon admission yesterday.  Pt admitted for alcohol withdrawal, UGIB, lactic acidosis.  ICU was consulted on 9/10/2020 for lactic acidosis, since resolved after IVF, was not requiring ICU level of care at that time.  Pt was then admitted to floors where CIWA protocol was started with ativan IVP taper.  Today, noted to have CIWA scores of 13-17. ICU was consulted for DTs and worsening CIWA scores.    Allergies  No Known Allergies      MEDICATIONS  (STANDING):  atorvastatin 20 milliGRAM(s) Oral at bedtime  chlorhexidine 4% Liquid 1 Application(s) Topical <User Schedule>  dexMEDEtomidine Infusion 0.2 MICROgram(s)/kG/Hr (3.76 mL/Hr) IV Continuous <Continuous>  folic acid 1 milliGRAM(s) Oral daily  lactated ringers. 1000 milliLiter(s) (125 mL/Hr) IV Continuous <Continuous>  metoprolol tartrate 25 milliGRAM(s) Oral three times a day  multivitamin 1 Tablet(s) Oral daily  pantoprazole Infusion 8 mG/Hr (10 mL/Hr) IV Continuous <Continuous>  thiamine 100 milliGRAM(s) Oral daily    MEDICATIONS  (PRN):  acetaminophen   Tablet .. 650 milliGRAM(s) Oral every 6 hours PRN Temp greater or equal to 38C (100.4F), Mild Pain (1 - 3)    Daily Height in cm: 170.18 (11 Sep 2020 00:05)    Daily Weight in k.6 (11 Sep 2020 05:23)    Drug Dosing Weight  Height (cm): 170.2 (11 Sep 2020 00:05)  Weight (kg): 75.2 (11 Sep 2020 00:05)  BMI (kg/m2): 26 (11 Sep 2020 00:05)  BSA (m2): 1.87 (11 Sep 2020 00:05)    PAST MEDICAL & SURGICAL HISTORY:  DTs (delirium tremens)  Substance abuse  Gastritis  EtOH dependence  Mixed hyperlipidemia  PUD (peptic ulcer disease)  No significant past surgical history    FAMILY HISTORY:  No pertinent family history in first degree relatives (Father, Mother)    REVIEW OF SYSTEMS:  CONSTITUTIONAL: No fever, weight loss, or fatigue  EYES: No eye pain, visual disturbances, or discharge  ENMT:  No difficulty hearing, tinnitus, vertigo; No sinus or throat pain  NECK: No pain or stiffness  BREASTS: No pain, masses, or nipple discharge  RESPIRATORY: No cough, wheezing, chills or hemoptysis; No shortness of breath  CARDIOVASCULAR: No chest pain, palpitations, dizziness, or leg swelling  GASTROINTESTINAL: No abdominal or epigastric pain. No nausea, vomiting, or hematemesis; No diarrhea or constipation. No melena or hematochezia.  GENITOURINARY: No dysuria, frequency, hematuria, or incontinence  NEUROLOGICAL: No headaches, memory loss, loss of strength, numbness, or tremors  SKIN: No itching, burning, rashes, or lesions   LYMPH NODES: No enlarged glands  ENDOCRINE: No heat or cold intolerance; No hair loss  MUSCULOSKELETAL: No joint pain or swelling; No muscle, back, or extremity pain  PSYCHIATRIC: No depression, anxiety, mood swings, or difficulty sleeping  HEME/LYMPH: No easy bruising, or bleeding gums  ALLERGY AND IMMUNOLOGIC: No hives or eczema    ICU Vital Signs Last 24 Hrs  T(C): 37.1 (11 Sep 2020 14:02), Max: 37.2 (10 Sep 2020 14:40)  T(F): 98.7 (11 Sep 2020 14:02), Max: 99 (10 Sep 2020 14:40)  HR: 81 (11 Sep 2020 14:02) (70 - 135)  BP: 136/79 (11 Sep 2020 14:02) (111/67 - 146/89)  BP(mean): --  ABP: --  ABP(mean): --  RR: 20 (11 Sep 2020 14:02) (17 - 20)  SpO2: 98% (11 Sep 2020 14:02) (95% - 98%)      ABG - ( 10 Sep 2020 17:46 )  pH, Arterial: x     pH, Blood: 7.32  /  pCO2: 36    /  pO2: 83    / HCO3: 18    / Base Excess: -7.0  /  SaO2: 94        I&O's Detail    10 Sep 2020 07:01  -  11 Sep 2020 07:00  --------------------------------------------------------  IN:    Lactated Ringers IV Bolus: 2500 mL    Oral Fluid: 250 mL    pantoprazole Infusion: 40 mL    Sodium Chloride 0.9% IV Bolus: 2000 mL  Total IN: 4790 mL    OUT:  Total OUT: 0 mL    Total NET: 4790 mL      LABS:  CBC Full  -  ( 10 Sep 2020 15:31 )  WBC Count : 9.77 K/uL  RBC Count : 5.00 M/uL  Hemoglobin : 14.1 g/dL  Hematocrit : 42.1 %  Platelet Count - Automated : 288 K/uL  Mean Cell Volume : 84.2 fl  Mean Cell Hemoglobin : 28.2 pg  Mean Cell Hemoglobin Concentration : 33.5 gm/dL  Auto Neutrophil # : 8.29 K/uL  Auto Lymphocyte # : 1.09 K/uL  Auto Monocyte # : 0.29 K/uL  Auto Eosinophil # : 0.00 K/uL  Auto Basophil # : 0.06 K/uL  Auto Neutrophil % : 84.8 %  Auto Lymphocyte % : 11.2 %  Auto Monocyte % : 3.0 %  Auto Eosinophil % : 0.0 %  Auto Basophil % : 0.6 %    09-10    140  |  105  |  16  ----------------------------<  89  4.3   |  17<L>  |  1.08    Ca    7.5<L>      10 Sep 2020 18:24  Phos  1.5     09-11  Mg     2.3     09-11    TPro  9.5<H>  /  Alb  4.4  /  TBili  0.7  /  DBili  x   /  AST  51<H>  /  ALT  32  /  AlkPhos  60  09-10  PT/INR - ( 10 Sep 2020 15:31 )   PT: 11.6 sec;   INR: 1.00 ratio    PTT - ( 10 Sep 2020 15:31 )  PTT:26.5 sec  Urinalysis Basic - ( 10 Sep 2020 23:53 )    Color: Yellow / Appearance: Clear / S.020 / pH: x  Gluc: x / Ketone: Moderate  / Bili: Negative / Urobili: Negative mg/dL   Blood: x / Protein: 30 mg/dL / Nitrite: Negative   Leuk Esterase: Negative / RBC: 0-2 /HPF / WBC 0-2   Sq Epi: x / Non Sq Epi: Occasional / Bacteria: Occasional      CARDIAC MARKERS ( 10 Sep 2020 15:31 )  <.015 ng/mL / x     / x     / x     / x

## 2020-09-12 LAB
ALBUMIN SERPL ELPH-MCNC: 3.3 G/DL — SIGNIFICANT CHANGE UP (ref 3.3–5)
ALP SERPL-CCNC: 43 U/L — SIGNIFICANT CHANGE UP (ref 40–120)
ALT FLD-CCNC: 27 U/L — SIGNIFICANT CHANGE UP (ref 12–78)
ANION GAP SERPL CALC-SCNC: 7 MMOL/L — SIGNIFICANT CHANGE UP (ref 5–17)
APPEARANCE UR: CLEAR — SIGNIFICANT CHANGE UP
AST SERPL-CCNC: 44 U/L — HIGH (ref 15–37)
BILIRUB SERPL-MCNC: 1.3 MG/DL — HIGH (ref 0.2–1.2)
BILIRUB UR-MCNC: NEGATIVE — SIGNIFICANT CHANGE UP
BUN SERPL-MCNC: 10 MG/DL — SIGNIFICANT CHANGE UP (ref 7–23)
CALCIUM SERPL-MCNC: 8.5 MG/DL — SIGNIFICANT CHANGE UP (ref 8.5–10.1)
CHLORIDE SERPL-SCNC: 107 MMOL/L — SIGNIFICANT CHANGE UP (ref 96–108)
CO2 SERPL-SCNC: 26 MMOL/L — SIGNIFICANT CHANGE UP (ref 22–31)
COLOR SPEC: YELLOW — SIGNIFICANT CHANGE UP
CREAT SERPL-MCNC: 0.84 MG/DL — SIGNIFICANT CHANGE UP (ref 0.5–1.3)
CULTURE RESULTS: SIGNIFICANT CHANGE UP
DIFF PNL FLD: NEGATIVE — SIGNIFICANT CHANGE UP
GLUCOSE SERPL-MCNC: 114 MG/DL — HIGH (ref 70–99)
GLUCOSE UR QL: NEGATIVE MG/DL — SIGNIFICANT CHANGE UP
HCT VFR BLD CALC: 35.2 % — LOW (ref 39–50)
HGB BLD-MCNC: 11.7 G/DL — LOW (ref 13–17)
KETONES UR-MCNC: ABNORMAL
LEUKOCYTE ESTERASE UR-ACNC: NEGATIVE — SIGNIFICANT CHANGE UP
MAGNESIUM SERPL-MCNC: 2.3 MG/DL — SIGNIFICANT CHANGE UP (ref 1.6–2.6)
MCHC RBC-ENTMCNC: 28.3 PG — SIGNIFICANT CHANGE UP (ref 27–34)
MCHC RBC-ENTMCNC: 33.2 GM/DL — SIGNIFICANT CHANGE UP (ref 32–36)
MCV RBC AUTO: 85.2 FL — SIGNIFICANT CHANGE UP (ref 80–100)
NITRITE UR-MCNC: NEGATIVE — SIGNIFICANT CHANGE UP
NRBC # BLD: 0 /100 WBCS — SIGNIFICANT CHANGE UP (ref 0–0)
PH UR: 8 — SIGNIFICANT CHANGE UP (ref 5–8)
PHOSPHATE SERPL-MCNC: 2.2 MG/DL — LOW (ref 2.5–4.5)
PLATELET # BLD AUTO: 164 K/UL — SIGNIFICANT CHANGE UP (ref 150–400)
POTASSIUM SERPL-MCNC: 3.6 MMOL/L — SIGNIFICANT CHANGE UP (ref 3.5–5.3)
POTASSIUM SERPL-SCNC: 3.6 MMOL/L — SIGNIFICANT CHANGE UP (ref 3.5–5.3)
PROT SERPL-MCNC: 7.2 GM/DL — SIGNIFICANT CHANGE UP (ref 6–8.3)
PROT UR-MCNC: NEGATIVE MG/DL — SIGNIFICANT CHANGE UP
RBC # BLD: 4.13 M/UL — LOW (ref 4.2–5.8)
RBC # FLD: 12.1 % — SIGNIFICANT CHANGE UP (ref 10.3–14.5)
SARS-COV-2 IGG SERPL QL IA: NEGATIVE — SIGNIFICANT CHANGE UP
SARS-COV-2 IGM SERPL IA-ACNC: 0.01 INDEX — SIGNIFICANT CHANGE UP
SODIUM SERPL-SCNC: 140 MMOL/L — SIGNIFICANT CHANGE UP (ref 135–145)
SP GR SPEC: 1.01 — SIGNIFICANT CHANGE UP (ref 1.01–1.02)
SPECIMEN SOURCE: SIGNIFICANT CHANGE UP
UROBILINOGEN FLD QL: NEGATIVE MG/DL — SIGNIFICANT CHANGE UP
WBC # BLD: 5.75 K/UL — SIGNIFICANT CHANGE UP (ref 3.8–10.5)
WBC # FLD AUTO: 5.75 K/UL — SIGNIFICANT CHANGE UP (ref 3.8–10.5)

## 2020-09-12 PROCEDURE — 99291 CRITICAL CARE FIRST HOUR: CPT

## 2020-09-12 PROCEDURE — 71045 X-RAY EXAM CHEST 1 VIEW: CPT | Mod: 26

## 2020-09-12 RX ORDER — DEXMEDETOMIDINE HYDROCHLORIDE IN 0.9% SODIUM CHLORIDE 4 UG/ML
0.2 INJECTION INTRAVENOUS
Qty: 200 | Refills: 0 | Status: DISCONTINUED | OUTPATIENT
Start: 2020-09-12 | End: 2020-09-17

## 2020-09-12 RX ORDER — POTASSIUM PHOSPHATE, MONOBASIC POTASSIUM PHOSPHATE, DIBASIC 236; 224 MG/ML; MG/ML
15 INJECTION, SOLUTION INTRAVENOUS ONCE
Refills: 0 | Status: COMPLETED | OUTPATIENT
Start: 2020-09-12 | End: 2020-09-12

## 2020-09-12 RX ORDER — PHENOBARBITAL 60 MG
130 TABLET ORAL
Refills: 0 | Status: DISCONTINUED | OUTPATIENT
Start: 2020-09-12 | End: 2020-09-15

## 2020-09-12 RX ORDER — PHENOBARBITAL 60 MG
130 TABLET ORAL ONCE
Refills: 0 | Status: DISCONTINUED | OUTPATIENT
Start: 2020-09-12 | End: 2020-09-12

## 2020-09-12 RX ADMIN — Medication 1 MILLIGRAM(S): at 14:07

## 2020-09-12 RX ADMIN — DEXMEDETOMIDINE HYDROCHLORIDE IN 0.9% SODIUM CHLORIDE 3.76 MICROGRAM(S)/KG/HR: 4 INJECTION INTRAVENOUS at 19:00

## 2020-09-12 RX ADMIN — SODIUM CHLORIDE 100 MILLILITER(S): 9 INJECTION, SOLUTION INTRAVENOUS at 10:15

## 2020-09-12 RX ADMIN — DEXMEDETOMIDINE HYDROCHLORIDE IN 0.9% SODIUM CHLORIDE 3.76 MICROGRAM(S)/KG/HR: 4 INJECTION INTRAVENOUS at 16:18

## 2020-09-12 RX ADMIN — PANTOPRAZOLE SODIUM 40 MILLIGRAM(S): 20 TABLET, DELAYED RELEASE ORAL at 05:00

## 2020-09-12 RX ADMIN — DEXMEDETOMIDINE HYDROCHLORIDE IN 0.9% SODIUM CHLORIDE 3.76 MICROGRAM(S)/KG/HR: 4 INJECTION INTRAVENOUS at 13:29

## 2020-09-12 RX ADMIN — Medication 130 MILLIGRAM(S): at 07:30

## 2020-09-12 RX ADMIN — ATORVASTATIN CALCIUM 20 MILLIGRAM(S): 80 TABLET, FILM COATED ORAL at 22:38

## 2020-09-12 RX ADMIN — CHLORHEXIDINE GLUCONATE 1 APPLICATION(S): 213 SOLUTION TOPICAL at 06:00

## 2020-09-12 RX ADMIN — PANTOPRAZOLE SODIUM 40 MILLIGRAM(S): 20 TABLET, DELAYED RELEASE ORAL at 17:33

## 2020-09-12 RX ADMIN — Medication 650 MILLIGRAM(S): at 15:47

## 2020-09-12 RX ADMIN — SODIUM CHLORIDE 100 MILLILITER(S): 9 INJECTION, SOLUTION INTRAVENOUS at 19:03

## 2020-09-12 RX ADMIN — Medication 260 MILLIGRAM(S): at 05:00

## 2020-09-12 RX ADMIN — Medication 260 MILLIGRAM(S): at 12:53

## 2020-09-12 RX ADMIN — Medication 100 MILLIGRAM(S): at 14:09

## 2020-09-12 RX ADMIN — Medication 650 MILLIGRAM(S): at 14:09

## 2020-09-12 RX ADMIN — POTASSIUM PHOSPHATE, MONOBASIC POTASSIUM PHOSPHATE, DIBASIC 62.5 MILLIMOLE(S): 236; 224 INJECTION, SOLUTION INTRAVENOUS at 06:45

## 2020-09-12 RX ADMIN — Medication 260 MILLIGRAM(S): at 17:35

## 2020-09-12 RX ADMIN — DEXMEDETOMIDINE HYDROCHLORIDE IN 0.9% SODIUM CHLORIDE 3.76 MICROGRAM(S)/KG/HR: 4 INJECTION INTRAVENOUS at 07:45

## 2020-09-12 RX ADMIN — DEXMEDETOMIDINE HYDROCHLORIDE IN 0.9% SODIUM CHLORIDE 3.76 MICROGRAM(S)/KG/HR: 4 INJECTION INTRAVENOUS at 22:48

## 2020-09-12 RX ADMIN — Medication 1 TABLET(S): at 14:09

## 2020-09-12 RX ADMIN — Medication 260 MILLIGRAM(S): at 00:50

## 2020-09-12 RX ADMIN — DEXMEDETOMIDINE HYDROCHLORIDE IN 0.9% SODIUM CHLORIDE 3.76 MICROGRAM(S)/KG/HR: 4 INJECTION INTRAVENOUS at 20:57

## 2020-09-12 RX ADMIN — DEXMEDETOMIDINE HYDROCHLORIDE IN 0.9% SODIUM CHLORIDE 3.76 MICROGRAM(S)/KG/HR: 4 INJECTION INTRAVENOUS at 10:58

## 2020-09-12 NOTE — PROGRESS NOTE ADULT - ASSESSMENT
55M PMH long standing ETOH abuse w/frequent admissions for alcohol withdrawal, GERD, HLD, alcoholic gastritis/esophagitis, PUD, hx of hypoxic respiratory failure requiring intubation due to aspiration PNA, most recently intubated April 2020 for hypoxic resp failure secondary to asp PNA with course complicated by septic shock and ROSA, and has had multiple admissions since then for abdominal pain secondary to alcoholic gastritis with hemorrhage. Presents again with abdominal pain and coffee ground emesis. Labs initially with elevated lactate of 10.9 which corrected after IVF hydration. Admitted to medical floor for alcohol intoxication with blood alcohol level in the 200s and alcohol gastritis with upper GI bleed. 9/10 pt with worsening agitation on CIWA with ativan taper s/p RRT for agitation requiring 3 security officers to restrain patient. Pt appeared to be having auditory/visual hallucinations talking to and answering someone over his shoulders when no one was in the room. Also was very restless scratching his head/arms/egs and rubbing his face over and over again but denied pruritis. Transferred to ICU for alcohol withdrawal, DTs, alcohol gastritis with upper GI bleed, lactici acidosis (resolved).    1. NEURO  - cont sedation with standing phenobarbital and precedex drip  - if agitation persists can layer in standing librium  - cont with thiamine, MVI, folic acid    2. GI  - has not had any further episodes of coffee ground emesis  - Hb 11.7 which does not require pRBC transfusion  - cont with protonix twice daily, off protonix drip  - can give trial of PO intake when more awake  - supportive care  - IVF if not taking enough po intake  - GI follow up    3. RENAL  - lactic acidosis resolved with IVF  - monitor electrolytes  - supplement hypophosphatemia     4. PULM  - end tidal applied to monitor respiratory status while on sedation  - currently protecting airway    5. GEN  - pt needs alcohol cessation however this has been discussed with pt throughout his multiple admissions in the past and he does not express any willingness to stop drinking     Critical Care Time: 35 min 55M PMH long standing ETOH abuse w/frequent admissions for alcohol withdrawal, GERD, HLD, alcoholic gastritis/esophagitis, PUD, hx of hypoxic respiratory failure requiring intubation due to aspiration PNA, most recently intubated April 2020 for hypoxic resp failure secondary to asp PNA with course complicated by septic shock and ROSA, and has had multiple admissions since then for abdominal pain secondary to alcoholic gastritis with hemorrhage. Presents again with abdominal pain and coffee ground emesis. Labs initially with elevated lactate of 10.9 which corrected after IVF hydration. Admitted to medical floor for alcohol intoxication with blood alcohol level in the 200s and alcohol gastritis with upper GI bleed. 9/10 pt with worsening agitation on CIWA with ativan taper s/p RRT for agitation requiring 3 security officers to restrain patient. Pt appeared to be having auditory/visual hallucinations talking to and answering someone over his shoulders when no one was in the room. Also was very restless scratching his head/arms/egs and rubbing his face over and over again but denied pruritis. Transferred to ICU for delirium (due to intoxication vs pt now starting to withdraw), alcohol gastritis with upper GI bleed, lactic acidosis (resolved).    1. NEURO  - cont sedation with standing phenobarbital and precedex drip  - if agitation persists can layer in standing librium  - cont with thiamine, MVI, folic acid  - pt presented on 9/10, he is in the window for ETOH withdrawal now so will cont to monitor for further symtpoms    2. GI  - has not had any further episodes of coffee ground emesis  - Hb 11.7 which does not require pRBC transfusion  - cont with protonix twice daily, off protonix drip  - can give trial of PO intake when more awake  - supportive care  - IVF if not taking enough po intake  - GI follow up    3. RENAL  - lactic acidosis resolved with IVF  - monitor electrolytes  - supplement hypophosphatemia     4. PULM  - end tidal applied to monitor respiratory status while on sedation  - currently protecting airway    5. GEN  - pt needs alcohol cessation however this has been discussed with pt throughout his multiple admissions in the past and he does not express any willingness to stop drinking     Critical Care Time: 35 min

## 2020-09-12 NOTE — PROGRESS NOTE ADULT - SUBJECTIVE AND OBJECTIVE BOX
24 hr events:  transferred to ICU yesterday afternoon for severe agitation on medical floor  required phenobarbital 260mg x2 and initiation of precedex drip  overnight with agitation on standing phenobarbital 260mg q6 and precedex regimen: received haldol 5mg and break through phenobarbital 130mg x1  sedated this morning    ## ROS:  [x] unable to obtain due to sedation    ## Labs:  CBC:                        11.7   5.75  )-----------( 164      ( 12 Sep 2020 05:41 )             35.2     Chem:  09-12    140  |  107  |  10  ----------------------------<  114<H>  3.6   |  26  |  0.84    Ca    8.5      12 Sep 2020 05:41  Phos  2.2     09-12  Mg     2.3     09-12    TPro  7.2  /  Alb  3.3  /  TBili  1.3<H>  /  DBili  x   /  AST  44<H>  /  ALT  27  /  AlkPhos  43  09-12    Coags:  PT/INR - ( 10 Sep 2020 15:31 )   PT: 11.6 sec;   INR: 1.00 ratio    PTT - ( 10 Sep 2020 15:31 )  PTT:26.5 sec    Lactate, Blood (09.11.20 @ 00:25)    Lactate, Blood: 1.7 mmol/L    Culture - Blood (09.10.20 @ 21:09)    Specimen Source: .Blood Blood-Peripheral    Culture Results: No growth to date.      ## Imaging:  CXR < from: Xray Chest 1 View-PORTABLE IMMEDIATE (09.10.20 @ 16:24) >  Heart is normal in size.  The lung fields and pleural surfaces are unremarkable.    CT abdomen < from: CT Abdomen and Pelvis No Cont (09.10.20 @ 17:31) >  Mildly distended fluid-filled stomach secondary to gastroparesis or partial gastric outlet obstruction.  Nonspecific ascending colitis.  Small sliding hiatus hernia with thickened distal esophagus. Recommendendoscopy.  Distended urinary bladder.  Hepatic steatosis.            ## Medications:  atorvastatin 20 milliGRAM(s) Oral at bedtime      pantoprazole  Injectable 40 milliGRAM(s) IV Push two times a day    acetaminophen   Tablet .. 650 milliGRAM(s) Oral every 6 hours PRN  dexMEDEtomidine Infusion 0.2 MICROgram(s)/kG/Hr IV Continuous <Continuous>  PHENobarbital Injectable 260 milliGRAM(s) IV Push every 6 hours  PHENobarbital Injectable 130 milliGRAM(s) IV Push once      ## Vitals:  T(C): 37.1 (09-11-20 @ 14:45), Max: 37.1 (09-11-20 @ 14:02)  HR: 88 (09-12-20 @ 10:00) (46 - 88)  BP: 116/78 (09-12-20 @ 10:00) (103/71 - 137/88)  BP(mean): 88 (09-12-20 @ 10:00) (78 - 100)  RR: 17 (09-12-20 @ 10:00) (13 - 22)  SpO2: 95% (09-12-20 @ 10:00) (95% - 100%)    ABG: ABG - ( 10 Sep 2020 17:46 )  pH, Arterial: x     pH, Blood: 7.32  /  pCO2: 36    /  pO2: 83    / HCO3: 18    / Base Excess: -7.0  /  SaO2: 94                    09-11 @ 07:01  -  09-12 @ 07:00  --------------------------------------------------------  IN: 851.2 mL / OUT: 600 mL / NET: 251.2 mL    09-12 @ 07:01  -  09-12 @ 11:57  --------------------------------------------------------  IN: 503.4 mL / OUT: 0 mL / NET: 503.4 mL          ## P/E:  Gen: lying comfortably in bed in no apparent distress, sedated  HEENT: PERRL  Resp: CTA B/L   CVS: RRR  Abd: soft NT/ND +BS  Ext: no c/c/e  Neuro: sedated    CENTRAL LINE: [ ] YES [x] NO  LOCATION:   DATE INSERTED:  REMOVE: [ ] YES [ ] NO      DUSTIN: [ ] YES [x] NO    DATE INSERTED:  REMOVE:  [ ] YES [ ] NO      A-LINE:  [ ] YES [x] NO  LOCATION:   DATE INSERTED:  REMOVE:  [ ] YES [ ] NO  EXPLAIN:    GLOBAL ISSUE/BEST PRACTICE:  Analgesia: n/a  Sedation: precedex  HOB elevation: yes  Stress ulcer prophylaxis: protonix  VTE prophylaxis: bilateral compression devices  Oral Care: n/a  Glycemic control: n/a  Nutrition: npo    CODE STATUS: [x] full code  [ ] DNR  [ ] DNI  [ ] MOLST  Goals of care discussion: [ ] yes

## 2020-09-13 LAB
ALBUMIN SERPL ELPH-MCNC: 2.8 G/DL — LOW (ref 3.3–5)
ALP SERPL-CCNC: 43 U/L — SIGNIFICANT CHANGE UP (ref 40–120)
ALT FLD-CCNC: 20 U/L — SIGNIFICANT CHANGE UP (ref 12–78)
ANION GAP SERPL CALC-SCNC: 8 MMOL/L — SIGNIFICANT CHANGE UP (ref 5–17)
AST SERPL-CCNC: 26 U/L — SIGNIFICANT CHANGE UP (ref 15–37)
BASOPHILS # BLD AUTO: 0.02 K/UL — SIGNIFICANT CHANGE UP (ref 0–0.2)
BASOPHILS NFR BLD AUTO: 0.2 % — SIGNIFICANT CHANGE UP (ref 0–2)
BILIRUB SERPL-MCNC: 0.9 MG/DL — SIGNIFICANT CHANGE UP (ref 0.2–1.2)
BUN SERPL-MCNC: 8 MG/DL — SIGNIFICANT CHANGE UP (ref 7–23)
CALCIUM SERPL-MCNC: 8.3 MG/DL — LOW (ref 8.5–10.1)
CHLORIDE SERPL-SCNC: 105 MMOL/L — SIGNIFICANT CHANGE UP (ref 96–108)
CO2 SERPL-SCNC: 25 MMOL/L — SIGNIFICANT CHANGE UP (ref 22–31)
CREAT SERPL-MCNC: 0.71 MG/DL — SIGNIFICANT CHANGE UP (ref 0.5–1.3)
CULTURE RESULTS: NO GROWTH — SIGNIFICANT CHANGE UP
EOSINOPHIL # BLD AUTO: 0.19 K/UL — SIGNIFICANT CHANGE UP (ref 0–0.5)
EOSINOPHIL NFR BLD AUTO: 2.1 % — SIGNIFICANT CHANGE UP (ref 0–6)
GLUCOSE SERPL-MCNC: 108 MG/DL — HIGH (ref 70–99)
HCT VFR BLD CALC: 33.2 % — LOW (ref 39–50)
HGB BLD-MCNC: 11.2 G/DL — LOW (ref 13–17)
IMM GRANULOCYTES NFR BLD AUTO: 0.3 % — SIGNIFICANT CHANGE UP (ref 0–1.5)
LYMPHOCYTES # BLD AUTO: 0.99 K/UL — LOW (ref 1–3.3)
LYMPHOCYTES # BLD AUTO: 11 % — LOW (ref 13–44)
MAGNESIUM SERPL-MCNC: 2.1 MG/DL — SIGNIFICANT CHANGE UP (ref 1.6–2.6)
MCHC RBC-ENTMCNC: 28.6 PG — SIGNIFICANT CHANGE UP (ref 27–34)
MCHC RBC-ENTMCNC: 33.7 GM/DL — SIGNIFICANT CHANGE UP (ref 32–36)
MCV RBC AUTO: 84.7 FL — SIGNIFICANT CHANGE UP (ref 80–100)
MONOCYTES # BLD AUTO: 0.38 K/UL — SIGNIFICANT CHANGE UP (ref 0–0.9)
MONOCYTES NFR BLD AUTO: 4.2 % — SIGNIFICANT CHANGE UP (ref 2–14)
NEUTROPHILS # BLD AUTO: 7.39 K/UL — SIGNIFICANT CHANGE UP (ref 1.8–7.4)
NEUTROPHILS NFR BLD AUTO: 82.2 % — HIGH (ref 43–77)
NRBC # BLD: 0 /100 WBCS — SIGNIFICANT CHANGE UP (ref 0–0)
PHOSPHATE SERPL-MCNC: 2.8 MG/DL — SIGNIFICANT CHANGE UP (ref 2.5–4.5)
PLATELET # BLD AUTO: 139 K/UL — LOW (ref 150–400)
POTASSIUM SERPL-MCNC: 3.1 MMOL/L — LOW (ref 3.5–5.3)
POTASSIUM SERPL-SCNC: 3.1 MMOL/L — LOW (ref 3.5–5.3)
PROCALCITONIN SERPL-MCNC: 0.51 NG/ML — HIGH (ref 0.02–0.1)
PROT SERPL-MCNC: 6.9 GM/DL — SIGNIFICANT CHANGE UP (ref 6–8.3)
RBC # BLD: 3.92 M/UL — LOW (ref 4.2–5.8)
RBC # FLD: 12.1 % — SIGNIFICANT CHANGE UP (ref 10.3–14.5)
SODIUM SERPL-SCNC: 138 MMOL/L — SIGNIFICANT CHANGE UP (ref 135–145)
SPECIMEN SOURCE: SIGNIFICANT CHANGE UP
WBC # BLD: 9 K/UL — SIGNIFICANT CHANGE UP (ref 3.8–10.5)
WBC # FLD AUTO: 9 K/UL — SIGNIFICANT CHANGE UP (ref 3.8–10.5)

## 2020-09-13 PROCEDURE — 99233 SBSQ HOSP IP/OBS HIGH 50: CPT

## 2020-09-13 RX ORDER — POTASSIUM CHLORIDE 20 MEQ
40 PACKET (EA) ORAL ONCE
Refills: 0 | Status: COMPLETED | OUTPATIENT
Start: 2020-09-13 | End: 2020-09-13

## 2020-09-13 RX ADMIN — Medication 260 MILLIGRAM(S): at 11:41

## 2020-09-13 RX ADMIN — Medication 130 MILLIGRAM(S): at 17:42

## 2020-09-13 RX ADMIN — DEXMEDETOMIDINE HYDROCHLORIDE IN 0.9% SODIUM CHLORIDE 3.76 MICROGRAM(S)/KG/HR: 4 INJECTION INTRAVENOUS at 02:12

## 2020-09-13 RX ADMIN — SODIUM CHLORIDE 100 MILLILITER(S): 9 INJECTION, SOLUTION INTRAVENOUS at 05:29

## 2020-09-13 RX ADMIN — Medication 260 MILLIGRAM(S): at 08:05

## 2020-09-13 RX ADMIN — DEXMEDETOMIDINE HYDROCHLORIDE IN 0.9% SODIUM CHLORIDE 3.76 MICROGRAM(S)/KG/HR: 4 INJECTION INTRAVENOUS at 22:38

## 2020-09-13 RX ADMIN — Medication 260 MILLIGRAM(S): at 00:45

## 2020-09-13 RX ADMIN — DEXMEDETOMIDINE HYDROCHLORIDE IN 0.9% SODIUM CHLORIDE 3.76 MICROGRAM(S)/KG/HR: 4 INJECTION INTRAVENOUS at 11:41

## 2020-09-13 RX ADMIN — DEXMEDETOMIDINE HYDROCHLORIDE IN 0.9% SODIUM CHLORIDE 3.76 MICROGRAM(S)/KG/HR: 4 INJECTION INTRAVENOUS at 00:46

## 2020-09-13 RX ADMIN — DEXMEDETOMIDINE HYDROCHLORIDE IN 0.9% SODIUM CHLORIDE 3.76 MICROGRAM(S)/KG/HR: 4 INJECTION INTRAVENOUS at 15:44

## 2020-09-13 RX ADMIN — PANTOPRAZOLE SODIUM 40 MILLIGRAM(S): 20 TABLET, DELAYED RELEASE ORAL at 05:57

## 2020-09-13 RX ADMIN — ATORVASTATIN CALCIUM 20 MILLIGRAM(S): 80 TABLET, FILM COATED ORAL at 22:37

## 2020-09-13 RX ADMIN — Medication 1 TABLET(S): at 11:42

## 2020-09-13 RX ADMIN — DEXMEDETOMIDINE HYDROCHLORIDE IN 0.9% SODIUM CHLORIDE 3.76 MICROGRAM(S)/KG/HR: 4 INJECTION INTRAVENOUS at 20:26

## 2020-09-13 RX ADMIN — DEXMEDETOMIDINE HYDROCHLORIDE IN 0.9% SODIUM CHLORIDE 3.76 MICROGRAM(S)/KG/HR: 4 INJECTION INTRAVENOUS at 17:42

## 2020-09-13 RX ADMIN — Medication 130 MILLIGRAM(S): at 09:58

## 2020-09-13 RX ADMIN — DEXMEDETOMIDINE HYDROCHLORIDE IN 0.9% SODIUM CHLORIDE 3.76 MICROGRAM(S)/KG/HR: 4 INJECTION INTRAVENOUS at 05:49

## 2020-09-13 RX ADMIN — CHLORHEXIDINE GLUCONATE 1 APPLICATION(S): 213 SOLUTION TOPICAL at 06:04

## 2020-09-13 RX ADMIN — Medication 100 MILLIGRAM(S): at 11:42

## 2020-09-13 RX ADMIN — DEXMEDETOMIDINE HYDROCHLORIDE IN 0.9% SODIUM CHLORIDE 3.76 MICROGRAM(S)/KG/HR: 4 INJECTION INTRAVENOUS at 14:02

## 2020-09-13 RX ADMIN — Medication 40 MILLIEQUIVALENT(S): at 05:29

## 2020-09-13 RX ADMIN — Medication 260 MILLIGRAM(S): at 19:39

## 2020-09-13 RX ADMIN — SODIUM CHLORIDE 100 MILLILITER(S): 9 INJECTION, SOLUTION INTRAVENOUS at 15:44

## 2020-09-13 RX ADMIN — Medication 1 MILLIGRAM(S): at 11:41

## 2020-09-13 RX ADMIN — PANTOPRAZOLE SODIUM 40 MILLIGRAM(S): 20 TABLET, DELAYED RELEASE ORAL at 17:15

## 2020-09-13 NOTE — DIETITIAN INITIAL EVALUATION ADULT. - PERTINENT MEDS FT
MEDICATIONS  (STANDING):  atorvastatin 20 milliGRAM(s) Oral at bedtime  chlorhexidine 4% Liquid 1 Application(s) Topical <User Schedule>  dexMEDEtomidine Infusion 0.2 MICROgram(s)/kG/Hr (3.76 mL/Hr) IV Continuous <Continuous>  folic acid 1 milliGRAM(s) Oral daily  lactated ringers. 1000 milliLiter(s) (100 mL/Hr) IV Continuous <Continuous>  multivitamin 1 Tablet(s) Oral daily  pantoprazole  Injectable 40 milliGRAM(s) IV Push two times a day  PHENobarbital Injectable 260 milliGRAM(s) IV Push every 6 hours  thiamine 100 milliGRAM(s) Oral daily    MEDICATIONS  (PRN):  acetaminophen   Tablet .. 650 milliGRAM(s) Oral every 6 hours PRN Temp greater or equal to 38C (100.4F), Mild Pain (1 - 3)  PHENobarbital Injectable 130 milliGRAM(s) IV Push every 2 hours PRN for CIWA>10

## 2020-09-13 NOTE — DIETITIAN INITIAL EVALUATION ADULT. - PERTINENT LABORATORY DATA
09-13 Na138 mmol/L Glu 108 mg/dL<H> K+ 3.1 mmol/L<L> Cr  0.71 mg/dL BUN 8 mg/dL 09-13 Phos 2.8 mg/dL 09-13 Alb 2.8 g/dL<L>

## 2020-09-13 NOTE — DIETITIAN INITIAL EVALUATION ADULT. - OTHER INFO
Pt adm w/abdominal pain. Per chart review PTA pt w/epigastric pain & vomiting; worse after eating, pt w/depressed appetite & PO intake, for same reason, x4 days PTA. Pt was only able to drink liquids. Met w/pt at bedside, pt seems confused/unable to answer questions appropriately. Pt denies N/V/D/C or abdominal pain/discomfort at present. Pt denies difficulty chewing/swallowing. Pt repeatedly states he feels hungry and wants to eat regular food. Per chart review PTA pt lived w/son. Pt unable to state UBW or weight history. Per previous admission record/RD note- pt weighed 68.2 kg/150# 05/2020 and had reported weight loss due to decreased PO intake. Current weight of 165# reflects weight gain of 15#/10% x 4 months. Noted diet advanced to Regular today.

## 2020-09-13 NOTE — PROGRESS NOTE ADULT - SUBJECTIVE AND OBJECTIVE BOX
24 hr events:  sedated overnight with 6AM phenobarbital dose held by nursing staff for lethargy  pt this morning became agitated, screaming, restless, climbing out of bed  IV lost and had to be re-established  breakthrough phenobarbital 130mg IV x2 had to be given today  on precedex drip and standing phenobarbital 260mg q6 hrs  febrile to 101.4 yesterday, afebrile today  +BM      ## ROS:  [x] unable to obtain sedation/ DTs with alcohol withdrawal       ## Labs:  CBC:                        11.2   9.00  )-----------( 139      ( 13 Sep 2020 03:55 )             33.2     Chem:  09-13    138  |  105  |  8   ----------------------------<  108<H>  3.1<L>   |  25  |  0.71    Ca    8.3<L>      13 Sep 2020 03:55  Phos  2.8     09-13  Mg     2.1     09-13    TPro  6.9  /  Alb  2.8<L>  /  TBili  0.9  /  DBili  x   /  AST  26  /  ALT  20  /  AlkPhos  43  09-13        ## Imaging:  CXR < from: Xray Chest 1 View-PORTABLE IMMEDIATE (Xray Chest 1 View-PORTABLE IMMEDIATE .) (09.12.20 @ 14:33) >  NG tube has been placed with the tip overlying the stomach. Lungs are clear.        ## Medications:  atorvastatin 20 milliGRAM(s) Oral at bedtime      pantoprazole  Injectable 40 milliGRAM(s) IV Push two times a day    acetaminophen   Tablet .. 650 milliGRAM(s) Oral every 6 hours PRN  dexMEDEtomidine Infusion 0.2 MICROgram(s)/kG/Hr IV Continuous <Continuous>  PHENobarbital Injectable 260 milliGRAM(s) IV Push every 6 hours  PHENobarbital Injectable 130 milliGRAM(s) IV Push every 2 hours PRN      ## Vitals:  T(C): 35.9 (09-13-20 @ 19:28), Max: 37.1 (09-13-20 @ 00:00)  HR: 70 (09-13-20 @ 19:00) (62 - 86)  BP: 111/80 (09-13-20 @ 19:00) (93/77 - 145/84)  BP(mean): 87 (09-13-20 @ 19:00) (74 - 100)  RR: 14 (09-13-20 @ 19:00) (11 - 19)  SpO2: 100% (09-13-20 @ 19:00) (97% - 100%)        09-12 @ 07:01  -  09-13 @ 07:00  --------------------------------------------------------  IN: 2984.7 mL / OUT: 2000 mL / NET: 984.7 mL    09-13 @ 07:01  -  09-13 @ 19:58  --------------------------------------------------------  IN: 1585.1 mL / OUT: 1700 mL / NET: -114.9 mL          ## P/E:  Gen: lying comfortably in bed in no apparent distress, sedated, was agitated restless earlier in morning  HEENT: EOMI, NGT in place  Resp: CTA B/L   CVS: RRR  Abd: soft NT/ND +BS  Ext: no c/c/e  Neuro: confused, moves all extremities      CENTRAL LINE: [ ] YES [x] NO  LOCATION:   DATE INSERTED:  REMOVE: [ ] YES [ ] NO      CHAN: [ ] YES [x] NO    DATE INSERTED:  REMOVE:  [ ] YES [ ] NO      A-LINE:  [ ] YES [x] NO  LOCATION:   DATE INSERTED:  REMOVE:  [ ] YES [ ] NO  EXPLAIN:    GLOBAL ISSUE/BEST PRACTICE:  Analgesia: n/a  Sedation: precedex  HOB elevation: yes  Stress ulcer prophylaxis: protonix  VTE prophylaxis: lovenox  Oral Care: n/a  Glycemic control: n/a  Nutrition: if awake enough encourage PO intake, otherwise start NGT feeds    CODE STATUS: [x] full code  [ ] DNR  [ ] DNI  [ ] MOLST  Goals of care discussion: [ ] yes

## 2020-09-13 NOTE — PROGRESS NOTE ADULT - ASSESSMENT
55M PMH long standing ETOH abuse w/frequent admissions for alcohol withdrawal, GERD, HLD, alcoholic gastritis/esophagitis, PUD, hx of hypoxic respiratory failure requiring intubation due to aspiration PNA, most recently intubated April 2020 for hypoxic resp failure secondary to asp PNA with course complicated by septic shock and ROSA, and has had multiple admissions since then for abdominal pain secondary to alcoholic gastritis with hemorrhage. Presents again with abdominal pain and coffee ground emesis. Labs initially with elevated lactate of 10.9 which corrected after IVF hydration. Admitted to medical floor for alcohol intoxication with blood alcohol level in the 200s and alcohol gastritis with upper GI bleed. 9/10 pt with worsening agitation on CIWA with ativan taper s/p RRT for agitation requiring 3 security officers to restrain patient. Pt appeared to be having auditory/visual hallucinations talking to and answering someone over his shoulders when no one was in the room. Also was very restless scratching his head/arms/egs and rubbing his face over and over again but denied pruritis. Transferred to ICU for delirium (due to intoxication, but pt now with ETOH withdraw), alcohol gastritis with upper GI bleed, lactic acidosis (resolved). Febrile 9/12    1. NEURO  - cont sedation with standing phenobarbital and precedex drip  - if agitation persists can layer in standing librium  - wean off precedex drip as tolerates  - cont with thiamine, MVI, folic acid  - pt presented on 9/10, he is in the window for ETOH withdrawal now     2. GI  - has not had any further episodes of coffee ground emesis  - Hb stable at 11.2 which does not require pRBC transfusion  - cont with protonix twice daily IV, off protonix drip   - can give trial of PO intake when awake  - supportive care  - tube feeds if pt not taking po intake  - GI follow up    3. RENAL  - lactic acidosis resolved with IVF  - monitor electrolytes  - supplement hypokalemia    4. PULM  - end tidal applied to monitor respiratory status while on sedation  - currently protecting airway    5. ID  - fever can be seen in setting of etoh withdrawal  - will monitor fur fever curve, afebrile today  - observe off antibx  - follow up u cx and blood cx results  - CXR without any infiltrates noted  - follow up procalcitonin level    6. GEN  - pt needs alcohol cessation     Critical Care Time: 35 min

## 2020-09-13 NOTE — DIETITIAN INITIAL EVALUATION ADULT. - PHYSICAL APPEARANCE
well nourished/BMI= 26.0. No edema documented. Per visual observation no muscle wasting/fat loss noted in: temples/clavicles/shoulders/orbital/buccal regions.

## 2020-09-13 NOTE — DIETITIAN INITIAL EVALUATION ADULT. - ENERGY NEEDS
Height (cm): 170.2 (09-11)  Weight (kg): 75.2 (09-11)  BMI (kg/m2): 26 (09-11)  IBW: 67 kg     % IBW: 112%        UBW: 160#       %UBW: 103%

## 2020-09-14 LAB
ALBUMIN SERPL ELPH-MCNC: 2.9 G/DL — LOW (ref 3.3–5)
ALP SERPL-CCNC: 46 U/L — SIGNIFICANT CHANGE UP (ref 40–120)
ALT FLD-CCNC: 21 U/L — SIGNIFICANT CHANGE UP (ref 12–78)
ANION GAP SERPL CALC-SCNC: 9 MMOL/L — SIGNIFICANT CHANGE UP (ref 5–17)
AST SERPL-CCNC: 23 U/L — SIGNIFICANT CHANGE UP (ref 15–37)
BILIRUB SERPL-MCNC: 0.5 MG/DL — SIGNIFICANT CHANGE UP (ref 0.2–1.2)
BUN SERPL-MCNC: 6 MG/DL — LOW (ref 7–23)
CALCIUM SERPL-MCNC: 8.7 MG/DL — SIGNIFICANT CHANGE UP (ref 8.5–10.1)
CHLORIDE SERPL-SCNC: 105 MMOL/L — SIGNIFICANT CHANGE UP (ref 96–108)
CO2 SERPL-SCNC: 27 MMOL/L — SIGNIFICANT CHANGE UP (ref 22–31)
CREAT SERPL-MCNC: 0.9 MG/DL — SIGNIFICANT CHANGE UP (ref 0.5–1.3)
GLUCOSE SERPL-MCNC: 104 MG/DL — HIGH (ref 70–99)
HCT VFR BLD CALC: 34.4 % — LOW (ref 39–50)
HGB BLD-MCNC: 11.2 G/DL — LOW (ref 13–17)
MAGNESIUM SERPL-MCNC: 2 MG/DL — SIGNIFICANT CHANGE UP (ref 1.6–2.6)
MCHC RBC-ENTMCNC: 27.9 PG — SIGNIFICANT CHANGE UP (ref 27–34)
MCHC RBC-ENTMCNC: 32.6 GM/DL — SIGNIFICANT CHANGE UP (ref 32–36)
MCV RBC AUTO: 85.8 FL — SIGNIFICANT CHANGE UP (ref 80–100)
NRBC # BLD: 0 /100 WBCS — SIGNIFICANT CHANGE UP (ref 0–0)
PHOSPHATE SERPL-MCNC: 2.4 MG/DL — LOW (ref 2.5–4.5)
PLATELET # BLD AUTO: 153 K/UL — SIGNIFICANT CHANGE UP (ref 150–400)
POTASSIUM SERPL-MCNC: 3.1 MMOL/L — LOW (ref 3.5–5.3)
POTASSIUM SERPL-SCNC: 3.1 MMOL/L — LOW (ref 3.5–5.3)
PROT SERPL-MCNC: 7.4 GM/DL — SIGNIFICANT CHANGE UP (ref 6–8.3)
RBC # BLD: 4.01 M/UL — LOW (ref 4.2–5.8)
RBC # FLD: 12.1 % — SIGNIFICANT CHANGE UP (ref 10.3–14.5)
SODIUM SERPL-SCNC: 141 MMOL/L — SIGNIFICANT CHANGE UP (ref 135–145)
WBC # BLD: 7.77 K/UL — SIGNIFICANT CHANGE UP (ref 3.8–10.5)
WBC # FLD AUTO: 7.77 K/UL — SIGNIFICANT CHANGE UP (ref 3.8–10.5)

## 2020-09-14 PROCEDURE — 99233 SBSQ HOSP IP/OBS HIGH 50: CPT

## 2020-09-14 PROCEDURE — 99231 SBSQ HOSP IP/OBS SF/LOW 25: CPT

## 2020-09-14 RX ORDER — POTASSIUM PHOSPHATE, MONOBASIC POTASSIUM PHOSPHATE, DIBASIC 236; 224 MG/ML; MG/ML
15 INJECTION, SOLUTION INTRAVENOUS ONCE
Refills: 0 | Status: COMPLETED | OUTPATIENT
Start: 2020-09-14 | End: 2020-09-14

## 2020-09-14 RX ORDER — SODIUM,POTASSIUM PHOSPHATES 278-250MG
2 POWDER IN PACKET (EA) ORAL EVERY 4 HOURS
Refills: 0 | Status: DISCONTINUED | OUTPATIENT
Start: 2020-09-14 | End: 2020-09-14

## 2020-09-14 RX ORDER — ENOXAPARIN SODIUM 100 MG/ML
40 INJECTION SUBCUTANEOUS DAILY
Refills: 0 | Status: DISCONTINUED | OUTPATIENT
Start: 2020-09-14 | End: 2020-09-20

## 2020-09-14 RX ADMIN — POTASSIUM PHOSPHATE, MONOBASIC POTASSIUM PHOSPHATE, DIBASIC 62.5 MILLIMOLE(S): 236; 224 INJECTION, SOLUTION INTRAVENOUS at 08:53

## 2020-09-14 RX ADMIN — Medication 260 MILLIGRAM(S): at 17:16

## 2020-09-14 RX ADMIN — ENOXAPARIN SODIUM 40 MILLIGRAM(S): 100 INJECTION SUBCUTANEOUS at 17:23

## 2020-09-14 RX ADMIN — SODIUM CHLORIDE 100 MILLILITER(S): 9 INJECTION, SOLUTION INTRAVENOUS at 03:32

## 2020-09-14 RX ADMIN — DEXMEDETOMIDINE HYDROCHLORIDE IN 0.9% SODIUM CHLORIDE 3.76 MICROGRAM(S)/KG/HR: 4 INJECTION INTRAVENOUS at 09:25

## 2020-09-14 RX ADMIN — DEXMEDETOMIDINE HYDROCHLORIDE IN 0.9% SODIUM CHLORIDE 3.76 MICROGRAM(S)/KG/HR: 4 INJECTION INTRAVENOUS at 23:35

## 2020-09-14 RX ADMIN — DEXMEDETOMIDINE HYDROCHLORIDE IN 0.9% SODIUM CHLORIDE 3.76 MICROGRAM(S)/KG/HR: 4 INJECTION INTRAVENOUS at 11:53

## 2020-09-14 RX ADMIN — Medication 130 MILLIGRAM(S): at 15:33

## 2020-09-14 RX ADMIN — SODIUM CHLORIDE 100 MILLILITER(S): 9 INJECTION, SOLUTION INTRAVENOUS at 14:13

## 2020-09-14 RX ADMIN — DEXMEDETOMIDINE HYDROCHLORIDE IN 0.9% SODIUM CHLORIDE 3.76 MICROGRAM(S)/KG/HR: 4 INJECTION INTRAVENOUS at 14:14

## 2020-09-14 RX ADMIN — DEXMEDETOMIDINE HYDROCHLORIDE IN 0.9% SODIUM CHLORIDE 3.76 MICROGRAM(S)/KG/HR: 4 INJECTION INTRAVENOUS at 20:21

## 2020-09-14 RX ADMIN — DEXMEDETOMIDINE HYDROCHLORIDE IN 0.9% SODIUM CHLORIDE 3.76 MICROGRAM(S)/KG/HR: 4 INJECTION INTRAVENOUS at 16:35

## 2020-09-14 RX ADMIN — Medication 260 MILLIGRAM(S): at 05:09

## 2020-09-14 RX ADMIN — PANTOPRAZOLE SODIUM 40 MILLIGRAM(S): 20 TABLET, DELAYED RELEASE ORAL at 05:09

## 2020-09-14 RX ADMIN — Medication 1 TABLET(S): at 15:24

## 2020-09-14 RX ADMIN — Medication 260 MILLIGRAM(S): at 23:11

## 2020-09-14 RX ADMIN — DEXMEDETOMIDINE HYDROCHLORIDE IN 0.9% SODIUM CHLORIDE 3.76 MICROGRAM(S)/KG/HR: 4 INJECTION INTRAVENOUS at 05:55

## 2020-09-14 RX ADMIN — Medication 260 MILLIGRAM(S): at 11:54

## 2020-09-14 RX ADMIN — Medication 100 MILLIGRAM(S): at 15:24

## 2020-09-14 RX ADMIN — Medication 130 MILLIGRAM(S): at 03:32

## 2020-09-14 RX ADMIN — Medication 130 MILLIGRAM(S): at 06:13

## 2020-09-14 RX ADMIN — Medication 260 MILLIGRAM(S): at 01:30

## 2020-09-14 RX ADMIN — PANTOPRAZOLE SODIUM 40 MILLIGRAM(S): 20 TABLET, DELAYED RELEASE ORAL at 17:22

## 2020-09-14 RX ADMIN — DEXMEDETOMIDINE HYDROCHLORIDE IN 0.9% SODIUM CHLORIDE 3.76 MICROGRAM(S)/KG/HR: 4 INJECTION INTRAVENOUS at 04:00

## 2020-09-14 RX ADMIN — CHLORHEXIDINE GLUCONATE 1 APPLICATION(S): 213 SOLUTION TOPICAL at 06:14

## 2020-09-14 RX ADMIN — SODIUM CHLORIDE 100 MILLILITER(S): 9 INJECTION, SOLUTION INTRAVENOUS at 23:35

## 2020-09-14 RX ADMIN — DEXMEDETOMIDINE HYDROCHLORIDE IN 0.9% SODIUM CHLORIDE 3.76 MICROGRAM(S)/KG/HR: 4 INJECTION INTRAVENOUS at 21:46

## 2020-09-14 RX ADMIN — DEXMEDETOMIDINE HYDROCHLORIDE IN 0.9% SODIUM CHLORIDE 3.76 MICROGRAM(S)/KG/HR: 4 INJECTION INTRAVENOUS at 07:34

## 2020-09-14 RX ADMIN — Medication 1 MILLIGRAM(S): at 15:24

## 2020-09-14 NOTE — PROGRESS NOTE ADULT - ASSESSMENT
55M PMH long standing ETOH abuse w/frequent admissions for alcohol withdrawal, GERD, HLD, alcoholic gastritis/esophagitis, PUD, hx of hypoxic respiratory failure requiring intubation due to aspiration PNA, most recently intubated April 2020 for hypoxic resp failure secondary to asp PNA with course complicated by septic shock and ROSA, and has had multiple admissions since then for abdominal pain secondary to alcoholic gastritis with hemorrhage. Presents again with abdominal pain and coffee ground emesis. Labs initially with elevated lactate of 10.9 which corrected after IVF hydration. Admitted to medical floor for alcohol intoxication with blood alcohol level in the 200s and alcohol gastritis with upper GI bleed. 9/10 pt with worsening agitation on CIWA with ativan taper s/p RRT for agitation requiring 3 security officers to restrain patient. Pt appeared to be having auditory/visual hallucinations talking to and answering someone over his shoulders when no one was in the room. Also was very restless scratching his head/arms/egs and rubbing his face over and over again but denied pruritis. Transferred to ICU for delirium (due to intoxication, but pt now with ETOH withdraw), alcohol gastritis with upper GI bleed, lactic acidosis (resolved). Febrile 9/12    1. NEURO  - cont sedation with standing phenobarbital and precedex drip  - if agitation persists can layer in standing librium  - wean off precedex drip as tolerates  - cont with thiamine, MVI, folic acid  - pt presented on 9/10, he is in the window for ETOH withdrawal now     2. GI  - has not had any further episodes of coffee ground emesis  - Hb stable at 11.2 which does not require pRBC transfusion  - cont with protonix twice daily IV, off protonix drip   - can give trial of PO intake when awake  - supportive care  - tube feeds if pt not taking po intake  - GI follow up    3. RENAL  - lactic acidosis resolved with IVF  - monitor electrolytes  - supplement hypokalemia    4. PULM  - end tidal applied to monitor respiratory status while on sedation  - currently protecting airway    5. ID  - fever can be seen in setting of etoh withdrawal  - will monitor fur fever curve, afebrile today  - observe off antibx  - blood& urine cultures (9/12) negative  - CXR without any infiltrates noted  - procalcitonin level: 0.51  - unlikely to have underlying infection    6. GEN  - pt needs alcohol cessation    55M PMH long standing ETOH abuse w/frequent admissions for alcohol withdrawal, GERD, HLD, alcoholic gastritis/esophagitis, PUD, hx of hypoxic respiratory failure requiring intubation due to aspiration PNA, most recently intubated April 2020 for hypoxic resp failure secondary to asp PNA with course complicated by septic shock and ROSA, and has had multiple admissions since then for abdominal pain secondary to alcoholic gastritis with hemorrhage. Presents again with abdominal pain and coffee ground emesis. Labs initially with elevated lactate of 10.9 which corrected after IVF hydration. Admitted to medical floor for alcohol intoxication with blood alcohol level in the 200s and alcohol gastritis with upper GI bleed. 9/10 pt with worsening agitation on CIWA with ativan taper s/p RRT for agitation requiring 3 security officers to restrain patient. Pt appeared to be having auditory/visual hallucinations talking to and answering someone over his shoulders when no one was in the room. Also was very restless scratching his head/arms/egs and rubbing his face over and over again but denied pruritis. Transferred to ICU for delirium (due to intoxication, but pt now with ETOH withdraw), alcohol gastritis with upper GI bleed, lactic acidosis (resolved). Febrile 9/12    1. NEURO  - cont sedation with standing phenobarbital and precedex drip  - if agitation persists can layer in standing librium  - wean off precedex drip as tolerates  - cont with thiamine, MVI, folic acid  - pt presented on 9/10, he is still in the window for ETOH withdrawal    2. GI  - has not had any further episodes of coffee ground emesis  - Hb stable at 11.2 which does not require pRBC transfusion  - cont with protonix twice daily IV, off protonix drip   - can give trial of PO intake when awake  - supportive care  - consider tube feeds if pt not taking po intake  - GI follow up    3. RENAL  - lactic acidosis resolved with IVF  - monitor electrolytes  - supplement hypokalemia    4. PULM  - end tidal applied to monitor respiratory status while on sedation  - currently protecting airway    5. ID  - fever resolved  - cont monitor temp  - observe off antibx  - blood& urine cultures (9/12) negative  - CXR without any infiltrates noted  - procalcitonin level: 0.51  - unlikely to have underlying infection considering the absence of fever and negative cultures    6. GEN  - pt needs alcohol cessation

## 2020-09-14 NOTE — PROGRESS NOTE BEHAVIORAL HEALTH - OTHER
superficially cooperative intermittent limited tenuous tremors not noted deferred at this time halting at times slurring not able to describe looks confused confused unable to ascertain due to limitation of exam does not seem to be responding ot internal stimuli

## 2020-09-14 NOTE — PROGRESS NOTE ADULT - ASSESSMENT
PI:    · Chief Complaint: The patient is a 55y Male complaining of abdominal pain.  · HPI Objective Statement: 55 years old male by ems c/o epigastric and mid abd pain vomiting coffee ground since yesterday and c/o feeling hot all over his body. Pt sts he drinks alcohol three to four times a week and last drink was 4 days ago. Pt denies headache, dizziness, blurred visions, light sensitivities, focal/distal weakness or numbness, cough, sob, chest pain, dysuria, or irregular bowel movements.  ----------------- As Above ---------------------- Seen earlier today  Patient is a poor historian. Patient states that he has been having coffee ground emesis over the past 3 - 4 days. No blood but very dark. ETOH abuse (+) Upper abdominal pain. No melena.  Denies NSAIDs.   Patient has been admitted for the same reason multiple times over he past few years. Last EGD was 5/ 2020- gastritis.   See labs  / CT scan      Upper GI bleed - Probably alcoholic gastritis HGB 11.2  Stable  -1) IV PPI  2) Advance diet as tolerated   3) f/u labs 4) ?EGD 5) Zofran PRN

## 2020-09-14 NOTE — PROGRESS NOTE BEHAVIORAL HEALTH - NSBHCONSULTRECOMMENDOTHER_PSY_A_CORE FT
start reducing the phenobarb / taper down daily: Patient is still on phenobarb 260mg IVP q6hrs since 9/11/2020

## 2020-09-14 NOTE — PROGRESS NOTE ADULT - SUBJECTIVE AND OBJECTIVE BOX
Patient is a 55y old  Male who presents with a chief complaint of abdominal pain (13 Sep 2020 19:57)      HPI:  Pt is a 54 y/o male w/ ETOH abuse w/frequent admissions,  GERD, HLD, alcoholic gastritis/esophagitis, PUD, hx of hypoxic respiratory failure requiring intubation due to aspiration PNA comes w/abdominal pain mostly in epigastric area and also reports emesis states coffee ground color.  Pt was last admitted 2 months ago for abdominal pains.  Reports pain mostly in epigastric area, worse w/eating for last 4 days w/decreased po intake and mostly just drinking as a result over this time. Usually drinks vodka almost a bottle on regular basis. no fever, chills, sob, cp, palpitaions, +n/+v/-d/-c no travesl or sick contacts. 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 1000 milliLiter(s) (100 mL/Hr) IV Continuous <Continuous>  multivitamin 1 Tablet(s) Oral daily  pantoprazole  Injectable 40 milliGRAM(s) IV Push two times a day  PHENobarbital Injectable 260 milliGRAM(s) IV Push every 6 hours  potassium phosphate IVPB 15 milliMole(s) IV Intermittent once  thiamine 100 milliGRAM(s) Oral daily    MEDICATIONS  (PRN):  acetaminophen   Tablet .. 650 milliGRAM(s) Oral every 6 hours PRN Temp greater or equal to 38C (100.4F), Mild Pain (1 - 3)  PHENobarbital Injectable 130 milliGRAM(s) IV Push every 2 hours PRN for CIWA>10      FAMILY HISTORY:  No pertinent family history in first degree relatives (Father, Mother)        Allergies    No Known Allergies    Intolerances        PMH/PSH:  DTs (delirium tremens)    Substance abuse    Gastritis    EtOH dependence    Mixed hyperlipidemia    PUD (peptic ulcer disease)    Alcohol abuse    No significant past surgical history          REVIEW OF SYSTEMS:  CONSTITUTIONAL: No fever, weight loss, or fatigue  EYES: No eye pain, visual disturbances, or discharge  ENMT:  No difficulty hearing, tinnitus, vertigo; No sinus or throat pain  NECK: No pain or stiffness  BREASTS: No pain, masses, or nipple discharge  RESPIRATORY: No cough, wheezing, chills or hemoptysis; No shortness of breath  CARDIOVASCULAR: No chest pain, palpitations, dizziness, or leg swelling  GASTROINTESTINAL:  See above   GENITOURINARY: No dysuria, frequency, hematuria, or incontinence  NEUROLOGICAL: No headaches, memory loss, loss of strength, numbness, or tremors  SKIN: No itching, burning, rashes, or lesions   LYMPH NODES: No enlarged glands  ENDOCRINE: No heat or cold intolerance; No hair loss  MUSCULOSKELETAL: No joint pain or swelling; No muscle, back, or extremity pain  PSYCHIATRIC: No depression, anxiety, mood swings, or difficulty sleeping  HEME/LYMPH: No easy bruising, or bleeding gums  ALLERGY AND IMMUNOLOGIC: No hives or eczema    Vital Signs Last 24 Hrs  T(C): 37.4 (14 Sep 2020 07:51), Max: 37.4 (14 Sep 2020 07:51)  T(F): 99.3 (14 Sep 2020 07:51), Max: 99.3 (14 Sep 2020 07:51)  HR: 84 (14 Sep 2020 07:00) (62 - 90)  BP: 115/71 (14 Sep 2020 07:00) (93/77 - 157/95)  BP(mean): 83 (14 Sep 2020 07:00) (72 - 111)  RR: 22 (14 Sep 2020 07:00) (11 - 22)  SpO2: 100% (14 Sep 2020 07:00) (95% - 100%)    PHYSICAL EXAM:  GENERAL: NAD, well-groomed, well-developed  HEAD:  Atraumatic, Normocephalic  EYES: EOMI, PERRLA, conjunctiva and sclera clear  NECK: Supple, No JVD, Normal thyroid  NERVOUS SYSTEM:  Sedated. No asterixis  CHEST/LUNG: Clear to percussion bilaterally; No rales, rhonchi, wheezing, or rubs  HEART: Regular rate and rhythm; No murmurs, rubs, or gallops  ABDOMEN: Soft, Nontender, Nondistended; Bowel sounds present  EXTREMITIES:  2+ Peripheral Pulses, No clubbing, cyanosis, or edema  LYMPH: No lymphadenopathy noted  SKIN: No rashes or lesions    LAB  09-10 @ 15:31  amylase 144   lipase 125                           11.2   7.77  )-----------( 153      ( 14 Sep 2020 05:19 )             34.4       CBC:   05:19  WBC 7.77   Hgb 11.2   Hct 34.4   Plts 153  MCV 85.8   03:55  WBC 9.00   Hgb 11.2   Hct 33.2   Plts 139  MCV 84.7   @ 05:41  WBC 5.75   Hgb 11.7   Hct 35.2   Plts 164  MCV 85.2  09-10 @ 15:31  WBC 9.77   Hgb 14.1   Hct 42.1   Plts 288  MCV 84.2      Chemistry:   @ 05:19  Na+ 141  K+ 3.1  Cl- 105  CO2 27  BUN 6  Cr 0.90      @ 03:55  Na+ 138  K+ 3.1  Cl- 105  CO2 25  BUN 8  Cr 0.71      @ 05:41  Na+ 140  K+ 3.6  Cl- 107  CO2 26  BUN 10  Cr 0.84      @ 18:29  Na+ 141  K+ 3.9  Cl- 106  CO2 26  BUN 11  Cr 0.92     09-10 @ 18:24  Na+ 140  K+ 4.3  Cl- 105  CO2 17  BUN 16  Cr 1.08     09-10 @ 15:31  Na+ 137  K+ 3.6  Cl- 93  CO2 19  BUN 19  Cr 1.41         Glucose, Serum: 104 mg/dL ( @ 05:19)  Glucose, Serum: 108 mg/dL ( 03:55)  Glucose, Serum: 114 mg/dL ( @ 05:41)  Glucose, Serum: 94 mg/dL ( @ 18:29)  Glucose, Serum: 89 mg/dL (09-10 @ 18:24)  Glucose, Serum: 101 mg/dL (09-10 @ 15:31)      14 Sep 2020 05:19    141    |  105    |  6      ----------------------------<  104    3.1     |  27     |  0.90   13 Sep 2020 03:55    138    |  105    |  8      ----------------------------<  108    3.1     |  25     |  0.71   12 Sep 2020 05:41    140    |  107    |  10     ----------------------------<  114    3.6     |  26     |  0.84   11 Sep 2020 18:29    141    |  106    |  11     ----------------------------<  94     3.9     |  26     |  0.92   10 Sep 2020 18:24    140    |  105    |  16     ----------------------------<  89     4.3     |  17     |  1.08   10 Sep 2020 15:31    137    |  93     |  19     ----------------------------<  101    3.6     |  19     |  1.41     Ca    8.7        14 Sep 2020 05:19  Ca    8.3        13 Sep 2020 03:55  Ca    8.5        12 Sep 2020 05:41  Ca    8.3        11 Sep 2020 18:29  Ca    7.5        10 Sep 2020 18:24  Ca    9.0        10 Sep 2020 15:31  Phos  2.4       14 Sep 2020 05:19  Phos  2.8       13 Sep 2020 03:55  Phos  2.2       12 Sep 2020 05:41  Phos  2.5       11 Sep 2020 18:29  Phos  1.5       11 Sep 2020 08:14  Phos  2.4       10 Sep 2020 22:19  Mg     2.0       14 Sep 2020 05:19  Mg     2.1       13 Sep 2020 03:55  Mg     2.3       12 Sep 2020 05:41  Mg     2.5       11 Sep 2020 18:29  Mg     2.3       11 Sep 2020 08:14  Mg     2.3       10 Sep 2020 22:19  Mg     1.5       10 Sep 2020 18:24    TPro  7.4    /  Alb  2.9    /  TBili  0.5    /  DBili  x      /  AST  23     /  ALT  21     /  AlkPhos  46     14 Sep 2020 05:19  TPro  6.9    /  Alb  2.8    /  TBili  0.9    /  DBili  x      /  AST  26     /  ALT  20     /  AlkPhos  43     13 Sep 2020 03:55  TPro  7.2    /  Alb  3.3    /  TBili  1.3    /  DBili  x      /  AST  44     /  ALT  27     /  AlkPhos  43     12 Sep 2020 05:41  TPro  9.5    /  Alb  4.4    /  TBili  0.7    /  DBili  x      /  AST  51     /  ALT  32     /  AlkPhos  60     10 Sep 2020 15:31          Urinalysis Basic - ( 12 Sep 2020 15:29 )    Color: Yellow / Appearance: Clear / S.010 / pH: x  Gluc: x / Ketone: Trace  / Bili: Negative / Urobili: Negative mg/dL   Blood: x / Protein: Negative mg/dL / Nitrite: Negative   Leuk Esterase: Negative / RBC: x / WBC x   Sq Epi: x / Non Sq Epi: x / Bacteria: x        CAPILLARY BLOOD GLUCOSE          C-Reactive Protein, Serum: <0.10 mg/dL (09-10 @ 20:28)      RADIOLOGY & ADDITIONAL TESTS:    Imaging Personally Reviewed:  [ ] YES  [ ] NO    Consultant(s) Notes Reviewed:  [ ] YES  [ ] NO    Care Discussed with Consultants/Other Providers [ ] YES  [ ] NO

## 2020-09-14 NOTE — PROGRESS NOTE ADULT - SUBJECTIVE AND OBJECTIVE BOX
24 hr events:  The patient was seen at bedside. Per nurse, the patient became agitated  tried to get out from the bed in the morning. Phenobarbital and Precedex were given to calm down and sedate the patient. No fever overnight. The patient has not been eating since admission to ICU (9/11/20). He has been adequately producing urine and had BM yesterday.     ## ROS:  [x] unable to obtain sedation/ DTs with alcohol withdrawal       ## Labs:  CBC:                        11.2   7.77  )-----------( 153      ( 14 Sep 2020 05:19 )             34.4     Chem:  09-14    141  |  105  |  6<L>  ----------------------------<  104<H>  3.1<L>   |  27  |  0.90    Ca    8.7      14 Sep 2020 05:19  Phos  2.4     09-14  Mg     2.0     09-14    TPro  7.4  /  Alb  2.9<L>  /  TBili  0.5  /  DBili  x   /  AST  23  /  ALT  21  /  AlkPhos  46  09-14    Procalcitonin, Serum: 0.51    ##Culture:  Culture - Urine (09.13.20 @ 01:01)   Specimen Source: .Urine Clean Catch (Midstream)   Culture Results: No growth Culture - Blood (09.13.20 @ 00:57)   Specimen Source #1: .Blood  Culture Results: No growth to date. Culture - Blood (09.13.20 @ 00:57)   Specimen Source #2: .Blood  Culture Results: No growth to date.       ## Imaging:  CXR < from: Xray Chest 1 View-PORTABLE IMMEDIATE (Xray Chest 1 View-PORTABLE IMMEDIATE .) (09.12.20 @ 14:33) >  NG tube has been placed with the tip overlying the stomach. Lungs are clear.        ## Medications:  MEDICATIONS  (STANDING):  atorvastatin 20 milliGRAM(s) Oral at bedtime  chlorhexidine 4% Liquid 1 Application(s) Topical <User Schedule>  dexMEDEtomidine Infusion 0.2 MICROgram(s)/kG/Hr (3.76 mL/Hr) IV Continuous <Continuous>  folic acid 1 milliGRAM(s) Oral daily  lactated ringers. 1000 milliLiter(s) (100 mL/Hr) IV Continuous <Continuous>  multivitamin 1 Tablet(s) Oral daily  pantoprazole  Injectable 40 milliGRAM(s) IV Push two times a day  PHENobarbital Injectable 260 milliGRAM(s) IV Push every 6 hours  potassium phosphate IVPB 15 milliMole(s) IV Intermittent once  thiamine 100 milliGRAM(s) Oral daily    MEDICATIONS  (PRN):  acetaminophen   Tablet .. 650 milliGRAM(s) Oral every 6 hours PRN Temp greater or equal to 38C (100.4F), Mild Pain (1 - 3)  PHENobarbital Injectable 130 milliGRAM(s) IV Push every 2 hours PRN for CIWA>10      ## Vitals:  ICU Vital Signs Last 24 Hrs  T(C): 37.4 (14 Sep 2020 07:51), Max: 37.4 (14 Sep 2020 07:51)  T(F): 99.3 (14 Sep 2020 07:51), Max: 99.3 (14 Sep 2020 07:51)  HR: 84 (14 Sep 2020 07:00) (62 - 90)  BP: 115/71 (14 Sep 2020 07:00) (93/77 - 157/95)  BP(mean): 83 (14 Sep 2020 07:00) (72 - 111)  RR: 22 (14 Sep 2020 07:00) (11 - 22)  SpO2: 100% (14 Sep 2020 07:00) (95% - 100%)    I&O's Summary    13 Sep 2020 07:01  -  14 Sep 2020 07:00  --------------------------------------------------------  IN: 3123.5 mL / OUT: 4200 mL / NET: -1076.5 mL            ## P/E:  Gen: lying comfortably in bed in no apparent distress, sedated  Neuro: responsive to verbal stimuli but not oriented, moving all extremities  HEENT: PERRL, NGT in place  Resp: clear to auscultation B/L   CVS: RRR, S1S2 with no murmur or gallop  Abd: soft NT/ND +BS  Ext: cold extremities, distal pulses present, no edema    CENTRAL LINE: [ ] YES [x] NO  LOCATION:   DATE INSERTED:  REMOVE: [ ] YES [ ] NO      CHAN: [ ] YES [x] NO    DATE INSERTED:  REMOVE:  [ ] YES [ ] NO      A-LINE:  [ ] YES [x] NO  LOCATION:   DATE INSERTED:  REMOVE:  [ ] YES [ ] NO  EXPLAIN:    GLOBAL ISSUE/BEST PRACTICE:  Analgesia: n/a  Sedation: precedex  HOB elevation: yes  Stress ulcer prophylaxis: protonix  VTE prophylaxis: lovenox  Oral Care: n/a  Glycemic control: n/a  Nutrition: if awake enough encourage PO intake, otherwise start NGT feeds    CODE STATUS: [x] full code  [ ] DNR  [ ] DNI  [ ] MOLST  Goals of care discussion: [ ] yes 24 hr events:  The patient was seen at bedside. Per nurse, the patient became agitated and tried to get out from the bed in the morning. Phenobarbital PRN and Precedex were given to calm down and sedate the patient. No fever overnight. The patient has not been eating since admission to ICU (9/11/20). He has been adequately producing urine and had BM yesterday.     ## ROS:  [x] unable to obtain sedation/ DTs with alcohol withdrawal       ## Labs:  CBC:                        11.2   7.77  )-----------( 153      ( 14 Sep 2020 05:19 )             34.4     Chem:  09-14    141  |  105  |  6<L>  ----------------------------<  104<H>  3.1<L>   |  27  |  0.90    Ca    8.7      14 Sep 2020 05:19  Phos  2.4     09-14  Mg     2.0     09-14    TPro  7.4  /  Alb  2.9<L>  /  TBili  0.5  /  DBili  x   /  AST  23  /  ALT  21  /  AlkPhos  46  09-14    Procalcitonin, Serum: 0.51    ##Culture:  Culture - Urine (09.13.20 @ 01:01)   Specimen Source: .Urine Clean Catch (Midstream)   Culture Results: No growth Culture - Blood (09.13.20 @ 00:57)   Specimen Source #1: .Blood  Culture Results: No growth to date. Culture - Blood (09.13.20 @ 00:57)   Specimen Source #2: .Blood  Culture Results: No growth to date.       ## Imaging:  CXR < from: Xray Chest 1 View-PORTABLE IMMEDIATE (Xray Chest 1 View-PORTABLE IMMEDIATE .) (09.12.20 @ 14:33) >  NG tube has been placed with the tip overlying the stomach. Lungs are clear.        ## Medications:  MEDICATIONS  (STANDING):  atorvastatin 20 milliGRAM(s) Oral at bedtime  chlorhexidine 4% Liquid 1 Application(s) Topical <User Schedule>  dexMEDEtomidine Infusion 0.2 MICROgram(s)/kG/Hr (3.76 mL/Hr) IV Continuous <Continuous>  folic acid 1 milliGRAM(s) Oral daily  lactated ringers. 1000 milliLiter(s) (100 mL/Hr) IV Continuous <Continuous>  multivitamin 1 Tablet(s) Oral daily  pantoprazole  Injectable 40 milliGRAM(s) IV Push two times a day  PHENobarbital Injectable 260 milliGRAM(s) IV Push every 6 hours  potassium phosphate IVPB 15 milliMole(s) IV Intermittent once  thiamine 100 milliGRAM(s) Oral daily    MEDICATIONS  (PRN):  acetaminophen   Tablet .. 650 milliGRAM(s) Oral every 6 hours PRN Temp greater or equal to 38C (100.4F), Mild Pain (1 - 3)  PHENobarbital Injectable 130 milliGRAM(s) IV Push every 2 hours PRN for CIWA>10      ## Vitals:  ICU Vital Signs Last 24 Hrs  T(C): 37.4 (14 Sep 2020 07:51), Max: 37.4 (14 Sep 2020 07:51)  T(F): 99.3 (14 Sep 2020 07:51), Max: 99.3 (14 Sep 2020 07:51)  HR: 84 (14 Sep 2020 07:00) (62 - 90)  BP: 115/71 (14 Sep 2020 07:00) (93/77 - 157/95)  BP(mean): 83 (14 Sep 2020 07:00) (72 - 111)  RR: 22 (14 Sep 2020 07:00) (11 - 22)  SpO2: 100% (14 Sep 2020 07:00) (95% - 100%)    I&O's Summary    13 Sep 2020 07:01  -  14 Sep 2020 07:00  --------------------------------------------------------  IN: 3123.5 mL / OUT: 4200 mL / NET: -1076.5 mL            ## P/E:  Gen: lying comfortably in bed in no apparent distress, sedated  Neuro: responsive to verbal stimuli but not oriented, moving all extremities  HEENT: PERRL, NGT in place  Resp: clear to auscultation B/L   CVS: RRR, S1S2 with no murmur or gallop  Abd: soft NT/ND +BS  Ext: cold extremities, distal pulses present, no edema    CENTRAL LINE: [ ] YES [x] NO  LOCATION:   DATE INSERTED:  REMOVE: [ ] YES [ ] NO      CHAN: [ ] YES [x] NO    DATE INSERTED:  REMOVE:  [ ] YES [ ] NO      A-LINE:  [ ] YES [x] NO  LOCATION:   DATE INSERTED:  REMOVE:  [ ] YES [ ] NO  EXPLAIN:    GLOBAL ISSUE/BEST PRACTICE:  Analgesia: n/a  Sedation: precedex  HOB elevation: yes  Stress ulcer prophylaxis: protonix  VTE prophylaxis: lovenox  Oral Care: n/a  Glycemic control: n/a  Nutrition: if awake enough encourage PO intake, otherwise start NGT feeds    CODE STATUS: [x] full code  [ ] DNR  [ ] DNI  [ ] MOLST  Goals of care discussion: [ ] yes

## 2020-09-15 LAB
ANION GAP SERPL CALC-SCNC: 10 MMOL/L — SIGNIFICANT CHANGE UP (ref 5–17)
BUN SERPL-MCNC: 6 MG/DL — LOW (ref 7–23)
CALCIUM SERPL-MCNC: 8.9 MG/DL — SIGNIFICANT CHANGE UP (ref 8.5–10.1)
CHLORIDE SERPL-SCNC: 105 MMOL/L — SIGNIFICANT CHANGE UP (ref 96–108)
CO2 SERPL-SCNC: 23 MMOL/L — SIGNIFICANT CHANGE UP (ref 22–31)
CREAT SERPL-MCNC: 0.76 MG/DL — SIGNIFICANT CHANGE UP (ref 0.5–1.3)
CULTURE RESULTS: SIGNIFICANT CHANGE UP
CULTURE RESULTS: SIGNIFICANT CHANGE UP
GLUCOSE SERPL-MCNC: 93 MG/DL — SIGNIFICANT CHANGE UP (ref 70–99)
HCT VFR BLD CALC: 33.5 % — LOW (ref 39–50)
HGB BLD-MCNC: 11.3 G/DL — LOW (ref 13–17)
MAGNESIUM SERPL-MCNC: 1.9 MG/DL — SIGNIFICANT CHANGE UP (ref 1.6–2.6)
MCHC RBC-ENTMCNC: 28.2 PG — SIGNIFICANT CHANGE UP (ref 27–34)
MCHC RBC-ENTMCNC: 33.7 GM/DL — SIGNIFICANT CHANGE UP (ref 32–36)
MCV RBC AUTO: 83.5 FL — SIGNIFICANT CHANGE UP (ref 80–100)
NRBC # BLD: 0 /100 WBCS — SIGNIFICANT CHANGE UP (ref 0–0)
PHOSPHATE SERPL-MCNC: 3.2 MG/DL — SIGNIFICANT CHANGE UP (ref 2.5–4.5)
PLATELET # BLD AUTO: 183 K/UL — SIGNIFICANT CHANGE UP (ref 150–400)
POTASSIUM SERPL-MCNC: 3.2 MMOL/L — LOW (ref 3.5–5.3)
POTASSIUM SERPL-SCNC: 3.2 MMOL/L — LOW (ref 3.5–5.3)
RBC # BLD: 4.01 M/UL — LOW (ref 4.2–5.8)
RBC # FLD: 12.2 % — SIGNIFICANT CHANGE UP (ref 10.3–14.5)
SODIUM SERPL-SCNC: 138 MMOL/L — SIGNIFICANT CHANGE UP (ref 135–145)
SPECIMEN SOURCE: SIGNIFICANT CHANGE UP
SPECIMEN SOURCE: SIGNIFICANT CHANGE UP
WBC # BLD: 5.37 K/UL — SIGNIFICANT CHANGE UP (ref 3.8–10.5)
WBC # FLD AUTO: 5.37 K/UL — SIGNIFICANT CHANGE UP (ref 3.8–10.5)

## 2020-09-15 PROCEDURE — 99231 SBSQ HOSP IP/OBS SF/LOW 25: CPT

## 2020-09-15 PROCEDURE — 99291 CRITICAL CARE FIRST HOUR: CPT

## 2020-09-15 RX ORDER — HALOPERIDOL DECANOATE 100 MG/ML
5 INJECTION INTRAMUSCULAR EVERY 6 HOURS
Refills: 0 | Status: DISCONTINUED | OUTPATIENT
Start: 2020-09-15 | End: 2020-09-20

## 2020-09-15 RX ORDER — POTASSIUM CHLORIDE 20 MEQ
10 PACKET (EA) ORAL
Refills: 0 | Status: COMPLETED | OUTPATIENT
Start: 2020-09-15 | End: 2020-09-15

## 2020-09-15 RX ORDER — PHENOBARBITAL 60 MG
260 TABLET ORAL EVERY 8 HOURS
Refills: 0 | Status: DISCONTINUED | OUTPATIENT
Start: 2020-09-15 | End: 2020-09-16

## 2020-09-15 RX ADMIN — DEXMEDETOMIDINE HYDROCHLORIDE IN 0.9% SODIUM CHLORIDE 3.76 MICROGRAM(S)/KG/HR: 4 INJECTION INTRAVENOUS at 14:19

## 2020-09-15 RX ADMIN — Medication 1 MILLIGRAM(S): at 11:25

## 2020-09-15 RX ADMIN — DEXMEDETOMIDINE HYDROCHLORIDE IN 0.9% SODIUM CHLORIDE 3.76 MICROGRAM(S)/KG/HR: 4 INJECTION INTRAVENOUS at 05:10

## 2020-09-15 RX ADMIN — ATORVASTATIN CALCIUM 20 MILLIGRAM(S): 80 TABLET, FILM COATED ORAL at 22:47

## 2020-09-15 RX ADMIN — DEXMEDETOMIDINE HYDROCHLORIDE IN 0.9% SODIUM CHLORIDE 3.76 MICROGRAM(S)/KG/HR: 4 INJECTION INTRAVENOUS at 10:54

## 2020-09-15 RX ADMIN — DEXMEDETOMIDINE HYDROCHLORIDE IN 0.9% SODIUM CHLORIDE 3.76 MICROGRAM(S)/KG/HR: 4 INJECTION INTRAVENOUS at 08:57

## 2020-09-15 RX ADMIN — Medication 1 TABLET(S): at 11:25

## 2020-09-15 RX ADMIN — Medication 100 MILLIEQUIVALENT(S): at 05:37

## 2020-09-15 RX ADMIN — HALOPERIDOL DECANOATE 5 MILLIGRAM(S): 100 INJECTION INTRAMUSCULAR at 15:11

## 2020-09-15 RX ADMIN — Medication 100 MILLIEQUIVALENT(S): at 06:35

## 2020-09-15 RX ADMIN — DEXMEDETOMIDINE HYDROCHLORIDE IN 0.9% SODIUM CHLORIDE 3.76 MICROGRAM(S)/KG/HR: 4 INJECTION INTRAVENOUS at 07:01

## 2020-09-15 RX ADMIN — PANTOPRAZOLE SODIUM 40 MILLIGRAM(S): 20 TABLET, DELAYED RELEASE ORAL at 17:19

## 2020-09-15 RX ADMIN — DEXMEDETOMIDINE HYDROCHLORIDE IN 0.9% SODIUM CHLORIDE 3.76 MICROGRAM(S)/KG/HR: 4 INJECTION INTRAVENOUS at 03:22

## 2020-09-15 RX ADMIN — ENOXAPARIN SODIUM 40 MILLIGRAM(S): 100 INJECTION SUBCUTANEOUS at 11:25

## 2020-09-15 RX ADMIN — DEXMEDETOMIDINE HYDROCHLORIDE IN 0.9% SODIUM CHLORIDE 3.76 MICROGRAM(S)/KG/HR: 4 INJECTION INTRAVENOUS at 01:24

## 2020-09-15 RX ADMIN — Medication 260 MILLIGRAM(S): at 20:58

## 2020-09-15 RX ADMIN — DEXMEDETOMIDINE HYDROCHLORIDE IN 0.9% SODIUM CHLORIDE 3.76 MICROGRAM(S)/KG/HR: 4 INJECTION INTRAVENOUS at 12:46

## 2020-09-15 RX ADMIN — Medication 130 MILLIGRAM(S): at 01:22

## 2020-09-15 RX ADMIN — DEXMEDETOMIDINE HYDROCHLORIDE IN 0.9% SODIUM CHLORIDE 3.76 MICROGRAM(S)/KG/HR: 4 INJECTION INTRAVENOUS at 18:45

## 2020-09-15 RX ADMIN — Medication 100 MILLIGRAM(S): at 11:25

## 2020-09-15 RX ADMIN — DEXMEDETOMIDINE HYDROCHLORIDE IN 0.9% SODIUM CHLORIDE 3.76 MICROGRAM(S)/KG/HR: 4 INJECTION INTRAVENOUS at 21:34

## 2020-09-15 RX ADMIN — PANTOPRAZOLE SODIUM 40 MILLIGRAM(S): 20 TABLET, DELAYED RELEASE ORAL at 05:37

## 2020-09-15 RX ADMIN — CHLORHEXIDINE GLUCONATE 1 APPLICATION(S): 213 SOLUTION TOPICAL at 11:46

## 2020-09-15 RX ADMIN — DEXMEDETOMIDINE HYDROCHLORIDE IN 0.9% SODIUM CHLORIDE 3.76 MICROGRAM(S)/KG/HR: 4 INJECTION INTRAVENOUS at 16:32

## 2020-09-15 RX ADMIN — Medication 260 MILLIGRAM(S): at 05:37

## 2020-09-15 RX ADMIN — Medication 100 MILLIEQUIVALENT(S): at 08:10

## 2020-09-15 RX ADMIN — SODIUM CHLORIDE 100 MILLILITER(S): 9 INJECTION, SOLUTION INTRAVENOUS at 08:58

## 2020-09-15 RX ADMIN — Medication 260 MILLIGRAM(S): at 11:25

## 2020-09-15 RX ADMIN — SODIUM CHLORIDE 100 MILLILITER(S): 9 INJECTION, SOLUTION INTRAVENOUS at 19:23

## 2020-09-15 NOTE — PROGRESS NOTE ADULT - ASSESSMENT
PI:    · Chief Complaint: The patient is a 55y Male complaining of abdominal pain.  · HPI Objective Statement: 55 years old male by ems c/o epigastric and mid abd pain vomiting coffee ground since yesterday and c/o feeling hot all over his body. Pt sts he drinks alcohol three to four times a week and last drink was 4 days ago. Pt denies headache, dizziness, blurred visions, light sensitivities, focal/distal weakness or numbness, cough, sob, chest pain, dysuria, or irregular bowel movements.  ----------------- As Above ---------------------- Seen earlier today  Patient is a poor historian. Patient states that he has been having coffee ground emesis over the past 3 - 4 days. No blood but very dark. ETOH abuse (+) Upper abdominal pain. No melena.  Denies NSAIDs.   Patient has been admitted for the same reason multiple times over he past few years. Last EGD was 5/ 2020- gastritis.   See labs  / CT scan      Upper GI bleed - Probably alcoholic gastritis HGB 11.3  Stable  -1) IV PPI  2) Advance diet as tolerated   3) f/u labs 4) ?EGD 5) Zofran PRN

## 2020-09-15 NOTE — PROGRESS NOTE ADULT - SUBJECTIVE AND OBJECTIVE BOX
Patient is a 55y old  Male who presents with a chief complaint of abdominal pain (15 Sep 2020 11:55)      HPI:  Pt is a 54 y/o male w/ ETOH abuse w/frequent admissions,  GERD, HLD, alcoholic gastritis/esophagitis, PUD, hx of hypoxic respiratory failure requiring intubation due to aspiration PNA comes w/abdominal pain mostly in epigastric area and also reports emesis states coffee ground color.  Pt was last admitted 2 months ago for abdominal pains.  Reports pain mostly in epigastric area, worse w/eating for last 4 days w/decreased po intake and mostly just drinking as a result over this time. Usually drinks vodka almost a bottle on regular basis. no fever, chills, sob, cp, palpitaions, +n/+v/-d/-c no travesl or sick contacts. (10 Sep 2020 20:33)      INTERVAL HPI/OVERNIGHT EVENTS:  Patient seen earlier today  Sedated. No GI symptoms      MEDICATIONS  (STANDING):  atorvastatin 20 milliGRAM(s) Oral at bedtime  chlorhexidine 4% Liquid 1 Application(s) Topical <User Schedule>  dexMEDEtomidine Infusion 0.2 MICROgram(s)/kG/Hr (3.76 mL/Hr) IV Continuous <Continuous>  enoxaparin Injectable 40 milliGRAM(s) SubCutaneous daily  folic acid 1 milliGRAM(s) Oral daily  lactated ringers. 1000 milliLiter(s) (100 mL/Hr) IV Continuous <Continuous>  multivitamin 1 Tablet(s) Oral daily  pantoprazole  Injectable 40 milliGRAM(s) IV Push two times a day  PHENobarbital Injectable 260 milliGRAM(s) IV Push every 8 hours  thiamine 100 milliGRAM(s) Oral daily    MEDICATIONS  (PRN):  acetaminophen   Tablet .. 650 milliGRAM(s) Oral every 6 hours PRN Temp greater or equal to 38C (100.4F), Mild Pain (1 - 3)  haloperidol    Injectable 5 milliGRAM(s) IV Push every 6 hours PRN Agitation      FAMILY HISTORY:  No pertinent family history in first degree relatives (Father, Mother)        Allergies    No Known Allergies    Intolerances        PMH/PSH:  DTs (delirium tremens)    Substance abuse    Gastritis    EtOH dependence    Mixed hyperlipidemia    PUD (peptic ulcer disease)    Alcohol abuse    No significant past surgical history          REVIEW OF SYSTEMS: Sedated  CONSTITUTIONAL: No fever, weight loss,   EYES: No eye pain, visual disturbances, or discharge  ENMT:  No difficulty hearing, tinnitus, vertigo; No sinus or throat pain  NECK: No pain or stiffness  BREASTS: No pain, masses, or nipple discharge  RESPIRATORY: No cough, wheezing, chills or hemoptysis; No shortness of breath  CARDIOVASCULAR: No chest pain, palpitations, dizziness, or leg swelling  GASTROINTESTINAL: See above   GENITOURINARY: No dysuria, frequency, hematuria, or incontinence  NEUROLOGICAL: No headaches, memory loss, loss of strength, numbness, or tremors  SKIN: No itching, burning, rashes, or lesions   LYMPH NODES: No enlarged glands  ENDOCRINE: No heat or cold intolerance; No hair loss  MUSCULOSKELETAL: No joint pain or swelling; No muscle, back, or extremity pain  PSYCHIATRIC: No depression, anxiety, mood swings, or difficulty sleeping  HEME/LYMPH: No easy bruising, or bleeding gums  ALLERGY AND IMMUNOLOGIC: No hives or eczema    Vital Signs Last 24 Hrs  T(C): 35.8 (15 Sep 2020 19:40), Max: 36.7 (15 Sep 2020 07:30)  T(F): 96.5 (15 Sep 2020 19:40), Max: 98.1 (15 Sep 2020 07:30)  HR: 75 (15 Sep 2020 19:00) (61 - 92)  BP: 122/93 (15 Sep 2020 19:00) (90/67 - 166/96)  BP(mean): 100 (15 Sep 2020 19:00) (73 - 135)  RR: 16 (15 Sep 2020 19:00) (12 - 27)  SpO2: 100% (15 Sep 2020 19:00) (95% - 100%)    PHYSICAL EXAM:  GENERAL: NAD, well-groomed, well-developed  HEAD:  Atraumatic, Normocephalic  EYES: EOMI, PERRLA, conjunctiva and sclera clear  NECK: Supple, No JVD, Normal thyroid  NERVOUS SYSTEM:  Alert but sedated  CHEST/LUNG: Clear to percussion bilaterally; No rales, rhonchi, wheezing, or rubs  HEART: Regular rate and rhythm; No murmurs, rubs, or gallops  ABDOMEN: Soft, Nontender, Nondistended; Bowel sounds present  EXTREMITIES:  2+ Peripheral Pulses, No clubbing, cyanosis, or edema  LYMPH: No lymphadenopathy noted  SKIN: No rashes or lesions    LAB                          11.3   5.37  )-----------( 183      ( 15 Sep 2020 04:04 )             33.5       CBC:  09-15 @ 04:04  WBC 5.37   Hgb 11.3   Hct 33.5   Plts 183  MCV 83.5  09-14 @ 05:19  WBC 7.77   Hgb 11.2   Hct 34.4   Plts 153  MCV 85.8  09-13 @ 03:55  WBC 9.00   Hgb 11.2   Hct 33.2   Plts 139  MCV 84.7  09-12 @ 05:41  WBC 5.75   Hgb 11.7   Hct 35.2   Plts 164  MCV 85.2  09-10 @ 15:31  WBC 9.77   Hgb 14.1   Hct 42.1   Plts 288  MCV 84.2      Chemistry:  09-15 @ 04:04  Na+ 138  K+ 3.2  Cl- 105  CO2 23  BUN 6  Cr 0.76     09-14 @ 05:19  Na+ 141  K+ 3.1  Cl- 105  CO2 27  BUN 6  Cr 0.90     09-13 @ 03:55  Na+ 138  K+ 3.1  Cl- 105  CO2 25  BUN 8  Cr 0.71     09-12 @ 05:41  Na+ 140  K+ 3.6  Cl- 107  CO2 26  BUN 10  Cr 0.84     09-11 @ 18:29  Na+ 141  K+ 3.9  Cl- 106  CO2 26  BUN 11  Cr 0.92     09-10 @ 18:24  Na+ 140  K+ 4.3  Cl- 105  CO2 17  BUN 16  Cr 1.08     09-10 @ 15:31  Na+ 137  K+ 3.6  Cl- 93  CO2 19  BUN 19  Cr 1.41         Glucose, Serum: 93 mg/dL (09-15 @ 04:04)  Glucose, Serum: 104 mg/dL (09-14 @ 05:19)  Glucose, Serum: 108 mg/dL (09-13 @ 03:55)  Glucose, Serum: 114 mg/dL (09-12 @ 05:41)  Glucose, Serum: 94 mg/dL (09-11 @ 18:29)  Glucose, Serum: 89 mg/dL (09-10 @ 18:24)  Glucose, Serum: 101 mg/dL (09-10 @ 15:31)      15 Sep 2020 04:04    138    |  105    |  6      ----------------------------<  93     3.2     |  23     |  0.76   14 Sep 2020 05:19    141    |  105    |  6      ----------------------------<  104    3.1     |  27     |  0.90   13 Sep 2020 03:55    138    |  105    |  8      ----------------------------<  108    3.1     |  25     |  0.71   12 Sep 2020 05:41    140    |  107    |  10     ----------------------------<  114    3.6     |  26     |  0.84   11 Sep 2020 18:29    141    |  106    |  11     ----------------------------<  94     3.9     |  26     |  0.92   10 Sep 2020 18:24    140    |  105    |  16     ----------------------------<  89     4.3     |  17     |  1.08   10 Sep 2020 15:31    137    |  93     |  19     ----------------------------<  101    3.6     |  19     |  1.41     Ca    8.9        15 Sep 2020 04:04  Ca    8.7        14 Sep 2020 05:19  Ca    8.3        13 Sep 2020 03:55  Ca    8.5        12 Sep 2020 05:41  Ca    8.3        11 Sep 2020 18:29  Ca    7.5        10 Sep 2020 18:24  Ca    9.0        10 Sep 2020 15:31  Phos  3.2       15 Sep 2020 04:04  Phos  2.4       14 Sep 2020 05:19  Phos  2.8       13 Sep 2020 03:55  Phos  2.2       12 Sep 2020 05:41  Phos  2.5       11 Sep 2020 18:29  Phos  1.5       11 Sep 2020 08:14  Phos  2.4       10 Sep 2020 22:19  Mg     1.9       15 Sep 2020 04:04  Mg     2.0       14 Sep 2020 05:19  Mg     2.1       13 Sep 2020 03:55  Mg     2.3       12 Sep 2020 05:41  Mg     2.5       11 Sep 2020 18:29  Mg     2.3       11 Sep 2020 08:14  Mg     2.3       10 Sep 2020 22:19    TPro  7.4    /  Alb  2.9    /  TBili  0.5    /  DBili  x      /  AST  23     /  ALT  21     /  AlkPhos  46     14 Sep 2020 05:19  TPro  6.9    /  Alb  2.8    /  TBili  0.9    /  DBili  x      /  AST  26     /  ALT  20     /  AlkPhos  43     13 Sep 2020 03:55  TPro  7.2    /  Alb  3.3    /  TBili  1.3    /  DBili  x      /  AST  44     /  ALT  27     /  AlkPhos  43     12 Sep 2020 05:41  TPro  9.5    /  Alb  4.4    /  TBili  0.7    /  DBili  x      /  AST  51     /  ALT  32     /  AlkPhos  60     10 Sep 2020 15:31              CAPILLARY BLOOD GLUCOSE          C-Reactive Protein, Serum: <0.10 mg/dL (09-10 @ 20:28)      RADIOLOGY & ADDITIONAL TESTS:    Imaging Personally Reviewed:  [ ] YES  [ ] NO    Consultant(s) Notes Reviewed:  [ ] YES  [ ] NO    Care Discussed with Consultants/Other Providers [ ] YES  [ ] NO

## 2020-09-15 NOTE — PROGRESS NOTE ADULT - ASSESSMENT
JS is a 55M PMH long standing ETOH abuse w/frequent admissions for alcohol withdrawal, GERD, HLD, alcoholic gastritis/esophagitis, PUD, hx of hypoxic respiratory failure requiring intubation due to aspiration PNA, most recently intubated April 2020 for hypoxic resp failure secondary to asp PNA with course complicated by septic shock and ROSA. He has had multiple admissions since then for abdominal pain secondary to alcoholic gastritis with hemorrhage. Presented again with abdominal pain and coffee ground emesis. Labs initially showed elevated lactate of 10.9 which corrected after IVF hydration. He was admitted to medical floor for alcohol intoxication with blood alcohol level in the 200s and alcohol gastritis with upper GI bleed. On 9/10, the patient was transferred to ICU with worsening agitation on CIWA and delirium. The patient is currently stable and making gradual improvement from alcohol withdrawal.     1. NEURO  - cont sedation with standing phenobarbital and precedex drip  - if agitation persists consider layering in standing librium  - wean off precedex drip as tolerates  - cont with thiamine, MVI, folic acid  - pt presented on 9/10, he is in the window for ETOH withdrawal    2. PULM  - check end tidal to monitor respiratory status while on sedation  - d/c supplemental oxygen  - currently protecting airway    3. GI  - has not had any further episodes of coffee ground emesis  - Hb stable around 11 which does not require pRBC transfusion  - cont with protonix twice daily IV  - can give trial of PO intake when awake  - consider tube feeds if pt not taking po intake  - GI follow up    4. RENAL  - lactic acidosis resolved with IVF  - monitor electrolytes  - supplement hypokalemia    5. ID  - fever resolved  - cont monitor temp  - observe off antibx  - blood& urine cultures (9/12) negative  - CXR without any infiltrates noted  - procalcitonin level (9/13): 0.51  - unlikely to have underlying infection considering the absence of fever and negative cultures    6. GEN  - pt needs alcohol cessation

## 2020-09-15 NOTE — PROGRESS NOTE BEHAVIORAL HEALTH - NSBHCONSULTRECOMMENDOTHER_PSY_A_CORE FT
start reducing the phenobarb / taper down daily: Patient is still on phenobarb 260mg IVP q6hrs since 9/11/2020 so would reduce to 260mg IVP q8hrs today, 260mg IVP BID tomorrow; then 260mg IVP qd split into 130mg IVP bid then the final day 130mg qd and stop.

## 2020-09-15 NOTE — PROGRESS NOTE ADULT - SUBJECTIVE AND OBJECTIVE BOX
24 hr events:  The patient was seen at bedside. The patient was in sleep and minimally responsive to verbal stimuli. Overnight, the patient became agitated and required one Phenobarbital PRN. Otherwise, no other event occurred. No nausea, vomiting, or hemoptysis was reported. The patient is making adequate urine but has not had bowel movement since yesterday. He has not been eating since admission to ICU (9/11/20).     ## ROS:  [x] unable to obtain sedation/ DTs with alcohol withdrawal       ## Labs:  CBC:                        11.3   5.37  )-----------( 183      ( 15 Sep 2020 04:04 )             33.5       Chem:  09-15    138  |  105  |  6<L>  ----------------------------<  93  3.2<L>   |  23  |  0.76    Ca    8.9      15 Sep 2020 04:04  Phos  3.2     09-15  Mg     1.9     09-15    TPro  7.4  /  Alb  2.9<L>  /  TBili  0.5  /  DBili  x   /  AST  23  /  ALT  21  /  AlkPhos  46  09-14        ## Medications:  MEDICATIONS  (STANDING):  atorvastatin 20 milliGRAM(s) Oral at bedtime  chlorhexidine 4% Liquid 1 Application(s) Topical <User Schedule>  dexMEDEtomidine Infusion 0.2 MICROgram(s)/kG/Hr (3.76 mL/Hr) IV Continuous <Continuous>  enoxaparin Injectable 40 milliGRAM(s) SubCutaneous daily  folic acid 1 milliGRAM(s) Oral daily  lactated ringers. 1000 milliLiter(s) (100 mL/Hr) IV Continuous <Continuous>  multivitamin 1 Tablet(s) Oral daily  pantoprazole  Injectable 40 milliGRAM(s) IV Push two times a day  PHENobarbital Injectable 260 milliGRAM(s) IV Push every 6 hours  thiamine 100 milliGRAM(s) Oral daily    MEDICATIONS  (PRN):  acetaminophen   Tablet .. 650 milliGRAM(s) Oral every 6 hours PRN Temp greater or equal to 38C (100.4F), Mild Pain (1 - 3)  PHENobarbital Injectable 130 milliGRAM(s) IV Push every 2 hours PRN for CIWA>10      ## Vitals:  ICU Vital Signs Last 24 Hrs  T(C): 36.7 (15 Sep 2020 07:30), Max: 36.9 (14 Sep 2020 12:30)  T(F): 98.1 (15 Sep 2020 07:30), Max: 98.5 (14 Sep 2020 12:30)  HR: 81 (15 Sep 2020 11:00) (61 - 92)  BP: 139/85 (15 Sep 2020 11:00) (98/66 - 186/95)  BP(mean): 99 (15 Sep 2020 11:00) (71 - 135)  RR: 17 (15 Sep 2020 11:00) (12 - 27)  SpO2: 100% (15 Sep 2020 11:00) (95% - 100%) on supplemental oxygen via nasal canula (2L/min)    I&O's Summary    14 Sep 2020 07:01  -  15 Sep 2020 07:00  --------------------------------------------------------  IN: 3710.6 mL / OUT: 3025 mL / NET: 685.6 mL    15 Sep 2020 07:01  -  15 Sep 2020 12:00  --------------------------------------------------------  IN: 482.7 mL / OUT: 0 mL / NET: 482.7 mL          ## P/E:  Gen: lying comfortably in bed in no apparent distress, sedated  Neuro: responsive to verbal stimuli but not oriented, moving all extremities  HEENT: PERRL, supple neck  Resp: clear to auscultation B/L   CVS: RRR, S1S2 with no murmur or gallop  Abd: soft NT/ND +BS  Ext: cold extremities, distal pulses present, no edema or cyanosis    CENTRAL LINE: [ ] YES [x] NO  LOCATION:   DATE INSERTED:  REMOVE: [ ] YES [ ] NO      CHAN: [ ] YES [x] NO    DATE INSERTED:  REMOVE:  [ ] YES [ ] NO      A-LINE:  [ ] YES [x] NO  LOCATION:   DATE INSERTED:  REMOVE:  [ ] YES [ ] NO  EXPLAIN:    GLOBAL ISSUE/BEST PRACTICE:  Analgesia: n/a  Sedation: precedex  HOB elevation: yes  Stress ulcer prophylaxis: protonix  VTE prophylaxis: lovenox  Oral Care: n/a  Glycemic control: n/a  Nutrition: if awake enough encourage PO intake    CODE STATUS: [x] full code  [ ] DNR  [ ] DNI  [ ] MOLST  Goals of care discussion: [ ] yes 24 hr events:  The patient was seen at bedside. The patient was sleeping and minimally responsive to verbal stimuli. Overnight, the patient became agitated and required one Phenobarbital PRN. Otherwise, no other event occurred. No nausea, vomiting, or hemoptysis was reported. The patient is making adequate urine but has not had bowel movement since yesterday. He has not been eating since admission to ICU (9/11/20).     ## ROS:  [x] unable to obtain sedation/ DTs with alcohol withdrawal       ## Labs:  CBC:                        11.3   5.37  )-----------( 183      ( 15 Sep 2020 04:04 )             33.5       Chem:  09-15    138  |  105  |  6<L>  ----------------------------<  93  3.2<L>   |  23  |  0.76    Ca    8.9      15 Sep 2020 04:04  Phos  3.2     09-15  Mg     1.9     09-15    TPro  7.4  /  Alb  2.9<L>  /  TBili  0.5  /  DBili  x   /  AST  23  /  ALT  21  /  AlkPhos  46  09-14        ## Medications:  MEDICATIONS  (STANDING):  atorvastatin 20 milliGRAM(s) Oral at bedtime  chlorhexidine 4% Liquid 1 Application(s) Topical <User Schedule>  dexMEDEtomidine Infusion 0.2 MICROgram(s)/kG/Hr (3.76 mL/Hr) IV Continuous <Continuous>  enoxaparin Injectable 40 milliGRAM(s) SubCutaneous daily  folic acid 1 milliGRAM(s) Oral daily  lactated ringers. 1000 milliLiter(s) (100 mL/Hr) IV Continuous <Continuous>  multivitamin 1 Tablet(s) Oral daily  pantoprazole  Injectable 40 milliGRAM(s) IV Push two times a day  PHENobarbital Injectable 260 milliGRAM(s) IV Push every 6 hours  thiamine 100 milliGRAM(s) Oral daily    MEDICATIONS  (PRN):  acetaminophen   Tablet .. 650 milliGRAM(s) Oral every 6 hours PRN Temp greater or equal to 38C (100.4F), Mild Pain (1 - 3)  PHENobarbital Injectable 130 milliGRAM(s) IV Push every 2 hours PRN for CIWA>10      ## Vitals:  ICU Vital Signs Last 24 Hrs  T(C): 36.7 (15 Sep 2020 07:30), Max: 36.9 (14 Sep 2020 12:30)  T(F): 98.1 (15 Sep 2020 07:30), Max: 98.5 (14 Sep 2020 12:30)  HR: 81 (15 Sep 2020 11:00) (61 - 92)  BP: 139/85 (15 Sep 2020 11:00) (98/66 - 186/95)  BP(mean): 99 (15 Sep 2020 11:00) (71 - 135)  RR: 17 (15 Sep 2020 11:00) (12 - 27)  SpO2: 100% (15 Sep 2020 11:00) (95% - 100%) on supplemental oxygen via nasal canula (2L/min)    I&O's Summary    14 Sep 2020 07:01  -  15 Sep 2020 07:00  --------------------------------------------------------  IN: 3710.6 mL / OUT: 3025 mL / NET: 685.6 mL    15 Sep 2020 07:01  -  15 Sep 2020 12:00  --------------------------------------------------------  IN: 482.7 mL / OUT: 0 mL / NET: 482.7 mL          ## P/E:  Gen: lying comfortably in bed in no apparent distress, sedated  Neuro: responsive to verbal stimuli but not oriented, moving all extremities  HEENT: PERRL, supple neck  Resp: clear to auscultation B/L   CVS: RRR, S1S2 with no murmur or gallop  Abd: soft NT/ND +BS  Ext: cold extremities, distal pulses present, no edema or cyanosis    CENTRAL LINE: [ ] YES [x] NO  LOCATION:   DATE INSERTED:  REMOVE: [ ] YES [ ] NO      CHAN: [ ] YES [x] NO    DATE INSERTED:  REMOVE:  [ ] YES [ ] NO      A-LINE:  [ ] YES [x] NO  LOCATION:   DATE INSERTED:  REMOVE:  [ ] YES [ ] NO  EXPLAIN:    GLOBAL ISSUE/BEST PRACTICE:  Analgesia: n/a  Sedation: precedex  HOB elevation: yes  Stress ulcer prophylaxis: protonix  VTE prophylaxis: lovenox  Oral Care: n/a  Glycemic control: n/a  Nutrition: if awake enough encourage PO intake    CODE STATUS: [x] full code  [ ] DNR  [ ] DNI  [ ] MOLST  Goals of care discussion: [ ] yes

## 2020-09-16 LAB
ALBUMIN SERPL ELPH-MCNC: 3 G/DL — LOW (ref 3.3–5)
ALP SERPL-CCNC: 48 U/L — SIGNIFICANT CHANGE UP (ref 40–120)
ALT FLD-CCNC: 18 U/L — SIGNIFICANT CHANGE UP (ref 12–78)
ANION GAP SERPL CALC-SCNC: 10 MMOL/L — SIGNIFICANT CHANGE UP (ref 5–17)
AST SERPL-CCNC: 18 U/L — SIGNIFICANT CHANGE UP (ref 15–37)
BILIRUB SERPL-MCNC: 0.5 MG/DL — SIGNIFICANT CHANGE UP (ref 0.2–1.2)
BUN SERPL-MCNC: 6 MG/DL — LOW (ref 7–23)
CALCIUM SERPL-MCNC: 9.4 MG/DL — SIGNIFICANT CHANGE UP (ref 8.5–10.1)
CHLORIDE SERPL-SCNC: 104 MMOL/L — SIGNIFICANT CHANGE UP (ref 96–108)
CO2 SERPL-SCNC: 24 MMOL/L — SIGNIFICANT CHANGE UP (ref 22–31)
CREAT SERPL-MCNC: 0.88 MG/DL — SIGNIFICANT CHANGE UP (ref 0.5–1.3)
GLUCOSE SERPL-MCNC: 72 MG/DL — SIGNIFICANT CHANGE UP (ref 70–99)
HCT VFR BLD CALC: 38.4 % — LOW (ref 39–50)
HGB BLD-MCNC: 12.5 G/DL — LOW (ref 13–17)
MAGNESIUM SERPL-MCNC: 2 MG/DL — SIGNIFICANT CHANGE UP (ref 1.6–2.6)
MCHC RBC-ENTMCNC: 27.7 PG — SIGNIFICANT CHANGE UP (ref 27–34)
MCHC RBC-ENTMCNC: 32.6 GM/DL — SIGNIFICANT CHANGE UP (ref 32–36)
MCV RBC AUTO: 85 FL — SIGNIFICANT CHANGE UP (ref 80–100)
NRBC # BLD: 0 /100 WBCS — SIGNIFICANT CHANGE UP (ref 0–0)
PHOSPHATE SERPL-MCNC: 3 MG/DL — SIGNIFICANT CHANGE UP (ref 2.5–4.5)
PLATELET # BLD AUTO: 183 K/UL — SIGNIFICANT CHANGE UP (ref 150–400)
POTASSIUM SERPL-MCNC: 3.7 MMOL/L — SIGNIFICANT CHANGE UP (ref 3.5–5.3)
POTASSIUM SERPL-SCNC: 3.7 MMOL/L — SIGNIFICANT CHANGE UP (ref 3.5–5.3)
PROT SERPL-MCNC: 8.5 GM/DL — HIGH (ref 6–8.3)
RBC # BLD: 4.52 M/UL — SIGNIFICANT CHANGE UP (ref 4.2–5.8)
RBC # FLD: 12.3 % — SIGNIFICANT CHANGE UP (ref 10.3–14.5)
SODIUM SERPL-SCNC: 138 MMOL/L — SIGNIFICANT CHANGE UP (ref 135–145)
WBC # BLD: 4.86 K/UL — SIGNIFICANT CHANGE UP (ref 3.8–10.5)
WBC # FLD AUTO: 4.86 K/UL — SIGNIFICANT CHANGE UP (ref 3.8–10.5)

## 2020-09-16 PROCEDURE — 99231 SBSQ HOSP IP/OBS SF/LOW 25: CPT

## 2020-09-16 PROCEDURE — 99233 SBSQ HOSP IP/OBS HIGH 50: CPT

## 2020-09-16 RX ORDER — PHENOBARBITAL 60 MG
260 TABLET ORAL ONCE
Refills: 0 | Status: DISCONTINUED | OUTPATIENT
Start: 2020-09-16 | End: 2020-09-16

## 2020-09-16 RX ORDER — PHENOBARBITAL 60 MG
130 TABLET ORAL
Refills: 0 | Status: DISCONTINUED | OUTPATIENT
Start: 2020-09-17 | End: 2020-09-17

## 2020-09-16 RX ORDER — SENNA PLUS 8.6 MG/1
2 TABLET ORAL AT BEDTIME
Refills: 0 | Status: DISCONTINUED | OUTPATIENT
Start: 2020-09-16 | End: 2020-09-20

## 2020-09-16 RX ORDER — HALOPERIDOL DECANOATE 100 MG/ML
5 INJECTION INTRAMUSCULAR ONCE
Refills: 0 | Status: COMPLETED | OUTPATIENT
Start: 2020-09-16 | End: 2020-09-16

## 2020-09-16 RX ADMIN — Medication 260 MILLIGRAM(S): at 05:52

## 2020-09-16 RX ADMIN — DEXMEDETOMIDINE HYDROCHLORIDE IN 0.9% SODIUM CHLORIDE 3.76 MICROGRAM(S)/KG/HR: 4 INJECTION INTRAVENOUS at 16:27

## 2020-09-16 RX ADMIN — DEXMEDETOMIDINE HYDROCHLORIDE IN 0.9% SODIUM CHLORIDE 3.76 MICROGRAM(S)/KG/HR: 4 INJECTION INTRAVENOUS at 19:58

## 2020-09-16 RX ADMIN — SODIUM CHLORIDE 100 MILLILITER(S): 9 INJECTION, SOLUTION INTRAVENOUS at 05:53

## 2020-09-16 RX ADMIN — CHLORHEXIDINE GLUCONATE 1 APPLICATION(S): 213 SOLUTION TOPICAL at 06:01

## 2020-09-16 RX ADMIN — HALOPERIDOL DECANOATE 5 MILLIGRAM(S): 100 INJECTION INTRAMUSCULAR at 22:17

## 2020-09-16 RX ADMIN — PANTOPRAZOLE SODIUM 40 MILLIGRAM(S): 20 TABLET, DELAYED RELEASE ORAL at 17:18

## 2020-09-16 RX ADMIN — HALOPERIDOL DECANOATE 5 MILLIGRAM(S): 100 INJECTION INTRAMUSCULAR at 02:48

## 2020-09-16 RX ADMIN — DEXMEDETOMIDINE HYDROCHLORIDE IN 0.9% SODIUM CHLORIDE 3.76 MICROGRAM(S)/KG/HR: 4 INJECTION INTRAVENOUS at 10:12

## 2020-09-16 RX ADMIN — Medication 100 MILLIGRAM(S): at 11:27

## 2020-09-16 RX ADMIN — DEXMEDETOMIDINE HYDROCHLORIDE IN 0.9% SODIUM CHLORIDE 3.76 MICROGRAM(S)/KG/HR: 4 INJECTION INTRAVENOUS at 02:26

## 2020-09-16 RX ADMIN — PANTOPRAZOLE SODIUM 40 MILLIGRAM(S): 20 TABLET, DELAYED RELEASE ORAL at 05:51

## 2020-09-16 RX ADMIN — Medication 260 MILLIGRAM(S): at 17:18

## 2020-09-16 RX ADMIN — Medication 1 MILLIGRAM(S): at 11:28

## 2020-09-16 RX ADMIN — DEXMEDETOMIDINE HYDROCHLORIDE IN 0.9% SODIUM CHLORIDE 3.76 MICROGRAM(S)/KG/HR: 4 INJECTION INTRAVENOUS at 04:56

## 2020-09-16 RX ADMIN — Medication 1 TABLET(S): at 11:27

## 2020-09-16 RX ADMIN — ATORVASTATIN CALCIUM 20 MILLIGRAM(S): 80 TABLET, FILM COATED ORAL at 22:17

## 2020-09-16 RX ADMIN — SENNA PLUS 2 TABLET(S): 8.6 TABLET ORAL at 22:17

## 2020-09-16 RX ADMIN — HALOPERIDOL DECANOATE 5 MILLIGRAM(S): 100 INJECTION INTRAMUSCULAR at 04:57

## 2020-09-16 RX ADMIN — DEXMEDETOMIDINE HYDROCHLORIDE IN 0.9% SODIUM CHLORIDE 3.76 MICROGRAM(S)/KG/HR: 4 INJECTION INTRAVENOUS at 12:45

## 2020-09-16 RX ADMIN — DEXMEDETOMIDINE HYDROCHLORIDE IN 0.9% SODIUM CHLORIDE 3.76 MICROGRAM(S)/KG/HR: 4 INJECTION INTRAVENOUS at 07:40

## 2020-09-16 RX ADMIN — SODIUM CHLORIDE 100 MILLILITER(S): 9 INJECTION, SOLUTION INTRAVENOUS at 15:31

## 2020-09-16 NOTE — PROGRESS NOTE ADULT - ASSESSMENT
PI:    · Chief Complaint: The patient is a 55y Male complaining of abdominal pain.  · HPI Objective Statement: 55 years old male by ems c/o epigastric and mid abd pain vomiting coffee ground since yesterday and c/o feeling hot all over his body. Pt sts he drinks alcohol three to four times a week and last drink was 4 days ago. Pt denies headache, dizziness, blurred visions, light sensitivities, focal/distal weakness or numbness, cough, sob, chest pain, dysuria, or irregular bowel movements.  ----------------- As Above ---------------------- Seen earlier today  Patient is a poor historian. Patient states that he has been having coffee ground emesis over the past 3 - 4 days. No blood but very dark. ETOH abuse (+) Upper abdominal pain. No melena.  Denies NSAIDs.   Patient has been admitted for the same reason multiple times over he past few years. Last EGD was 5/ 2020- gastritis.   See labs  / CT scan      Upper GI bleed - Probably alcoholic gastritis HGB 12.5  Stable  -1) IV PPI  2) Advance diet as tolerated   3) f/u labs 4) ?EGD 5) Zofran PRN

## 2020-09-16 NOTE — PROGRESS NOTE ADULT - ASSESSMENT
JS is a 55M PMH long standing ETOH abuse w/frequent admissions for alcohol withdrawal, GERD, HLD, alcoholic gastritis/esophagitis, PUD, hx of hypoxic respiratory failure requiring intubation due to aspiration PNA, most recently intubated April 2020 for hypoxic resp failure secondary to asp PNA with course complicated by septic shock and ROSA. He has had multiple admissions since then for abdominal pain secondary to alcoholic gastritis with hemorrhage. Presented again with abdominal pain and coffee ground emesis. Labs initially showed elevated lactate of 10.9 which corrected after IVF hydration. He was admitted to medical floor for alcohol intoxication with blood alcohol level in the 200s and alcohol gastritis with upper GI bleed. On 9/10, the patient was transferred to ICU with worsening agitation on CIWA and delirium. The patient is currently stable and making gradual improvement from alcohol withdrawal.     1. NEURO  - cont sedation with standing phenobarbital and precedex drip  - if agitation persists consider layering in standing librium  - wean off precedex drip as tolerates  - cont with thiamine, MVI, folic acid  - pt presented on 9/10, he is in the window for ETOH withdrawal    2. PULM  - check end tidal to monitor respiratory status while on sedation  - d/c supplemental oxygen  - currently protecting airway    3. GI  - has not had any further episodes of coffee ground emesis  - Hb stable around 11 which does not require pRBC transfusion  - cont with protonix twice daily IV  - can give trial of PO intake when awake  - consider tube feeds if pt not taking po intake  - GI follow up    4. RENAL  - lactic acidosis resolved with IVF  - monitor electrolytes  - supplement hypokalemia    5. ID  - fever resolved  - cont monitor temp  - observe off antibx  - blood& urine cultures (9/12) negative  - CXR without any infiltrates noted  - procalcitonin level (9/13): 0.51  - unlikely to have underlying infection considering the absence of fever and negative cultures    6. GEN  - pt needs alcohol cessation    JS is a 55M PMH long standing ETOH abuse w/frequent admissions for alcohol withdrawal, GERD, HLD, alcoholic gastritis/esophagitis, PUD, hx of hypoxic respiratory failure requiring intubation due to aspiration PNA, most recently intubated April 2020 for hypoxic resp failure secondary to asp PNA with course complicated by septic shock and ROSA. He has had multiple admissions since then for abdominal pain secondary to alcoholic gastritis with hemorrhage. Presented again with abdominal pain and coffee ground emesis. Labs initially showed elevated lactate of 10.9 which corrected after IVF hydration. He was admitted to medical floor for alcohol intoxication with blood alcohol level in the 200s and alcohol gastritis with upper GI bleed. On 9/10, the patient was transferred to ICU with worsening agitation on CIWA and delirium. The patient is currently stable and making gradual improvement from alcohol withdrawal.     1. NEURO  - cont sedation with standing phenobarbital and precedex drip  - wean off precedex drip as tolerates  - cont with thiamine, MVI, folic acid  - pt presented on 9/10, he is in the window for ETOH withdrawal and will be out of the window in couple days.    2. PULM  - currently protecting airway  - no support required    3. GI  - has not had any further episodes of coffee ground emesis  - Hb stable around 11 which does not require pRBC transfusion  - cont with protonix twice daily IV  - if staying awake, start oral feeding  - consider tube feeds if pt not taking po intake  - GI follow up: planning not to do endoscopy    4. RENAL  - lactic acidosis resolved with IVF  - monitor electrolytes  - supplement hypokalemia    5. ID  - fever resolved  - cont monitor temp  - observe off antibx  - blood& urine cultures (9/12) negative  - CXR without any infiltrates noted  - procalcitonin level (9/13): 0.51  - unlikely to have underlying infection considering the absence of fever and negative cultures    6. GEN  - pt needs alcohol cessation   - cont DVT prophylaxis with Lovenox   KIM is a 55M PMH long standing ETOH abuse w/frequent admissions for alcohol withdrawal, GERD, HLD, alcoholic gastritis/esophagitis, PUD, hx of hypoxic respiratory failure requiring intubation due to aspiration PNA, most recently intubated April 2020 for hypoxic resp failure secondary to asp PNA with course complicated by septic shock and ROSA. He has had multiple admissions since then for abdominal pain secondary to alcoholic gastritis with hemorrhage. Presented again with abdominal pain and coffee ground emesis. Labs initially showed elevated lactate of 10.9 which corrected after IVF hydration. He was admitted to medical floor for alcohol intoxication with blood alcohol level in the 200s and alcohol gastritis with upper GI bleed. On 9/10, the patient was transferred to ICU with worsening agitation on CIWA and delirium. The patient is currently stable and making gradual improvement from alcohol withdrawal.     1. NEURO  - cont sedation with standing phenobarbital and precedex drip  - decrease standing phenobarbital q8r to q12r  - wean off precedex drip as tolerates  - cont with thiamine, MVI, folic acid  - pt presented on 9/10, he is in the window for ETOH withdrawal and will be out of the window in couple days.    2. PULM  - currently protecting airway  - no support required    3. GI  - has not had any further episodes of coffee ground emesis  - Hb stable around 11 which does not require pRBC transfusion  - cont with protonix twice daily IV  - if staying awake, start oral feeding  - consider tube feeds if pt not taking po intake  - GI follow up: planning not to do endoscopy    4. RENAL  - lactic acidosis resolved with IVF  - monitor electrolytes  - supplement hypokalemia    5. ID  - fever resolved  - cont monitor temp  - observe off antibx  - blood& urine cultures (9/12) negative  - CXR without any infiltrates noted  - procalcitonin level (9/13): 0.51  - unlikely to have underlying infection considering the absence of fever and negative cultures    6. GEN  - pt needs alcohol cessation   - cont DVT prophylaxis with Lovenox   JS is a 55M PMH long standing ETOH abuse w/frequent admissions for alcohol withdrawal, GERD, HLD, alcoholic gastritis/esophagitis, PUD, hx of hypoxic respiratory failure requiring intubation due to aspiration PNA, most recently intubated April 2020 for hypoxic resp failure secondary to asp PNA with course complicated by septic shock and ROSA. He has had multiple admissions since then for abdominal pain secondary to alcoholic gastritis with hemorrhage. Presented again with abdominal pain and coffee ground emesis. Labs initially showed elevated lactate of 10.9 which corrected after IVF hydration. He was admitted to medical floor for alcohol intoxication with blood alcohol level in the 200s and alcohol gastritis with upper GI bleed. On 9/10, the patient was transferred to ICU with worsening agitation on CIWA and delirium. The patient is currently stable and making gradual improvement from alcohol withdrawal.     1. NEURO  - cont phenobarbital and precedex drip  - wean off precedex drip  - cont with thiamine, MVI, folic acid  - pt presented on 9/10, he is in the window for ETOH withdrawal and will be out of the window in couple days.    2. PULM  - currently protecting airway  - no support required    3. GI  - has not had any further episodes of coffee ground emesis  - Hb stable around 11 which does not require pRBC transfusion  - cont with protonix twice daily IV  - if staying awake, start oral feeding  - consider tube feeds if pt not taking po intake  - GI follow up: planning not to do endoscopy    4. RENAL  - lactic acidosis resolved with IVF  - monitor electrolytes  - supplement hypokalemia    5. ID  - fever resolved  - cont monitor temp  - observe off antibx  - blood& urine cultures (9/12) negative  - CXR without any infiltrates noted  - procalcitonin level (9/13): 0.51  - unlikely to have underlying infection considering the absence of fever and negative cultures    6. GEN  - pt needs alcohol cessation   - cont DVT prophylaxis with Lovenox

## 2020-09-16 NOTE — PROGRESS NOTE ADULT - SUBJECTIVE AND OBJECTIVE BOX
Patient is returning phone call. Ok to leave a detailed message.    24 hr events:  The patient was seen at bedside. The patient was awake and responsive to verbal stimuli. Today, the patient started to speak some English and was able to have a brief conversation. Overnight, the patient became agitated and received two Halodol PRN. Also, the patient had fever (Tmax 100.7) at 5am this morning. He is currently afebrile. No nausea, vomiting, or hemoptysis was reported. The patient is making adequate urine but has not had bowel movement for 2 days. He is still currently not eating since admission to ICU (9/11/20).     ## ROS:  [x] unable to obtain sedation/ DTs with alcohol withdrawal       ## Labs:  CBC:                        12.5   4.86  )-----------( 183      ( 16 Sep 2020 05:22 )             38.4                Chem:  09-16    138  |  104  |  6<L>  ----------------------------<  72  3.7   |  24  |  0.88    Ca    9.4      16 Sep 2020 05:22  Phos  3.0     09-16  Mg     2.0     09-16    TPro  8.5<H>  /  Alb  3.0<L>  /  TBili  0.5  /  DBili  x   /  AST  18  /  ALT  18  /  AlkPhos  48  09-16        ## Medications:  MEDICATIONS  (STANDING):  atorvastatin 20 milliGRAM(s) Oral at bedtime  chlorhexidine 4% Liquid 1 Application(s) Topical <User Schedule>  dexMEDEtomidine Infusion 0.2 MICROgram(s)/kG/Hr (3.76 mL/Hr) IV Continuous <Continuous>  enoxaparin Injectable 40 milliGRAM(s) SubCutaneous daily  folic acid 1 milliGRAM(s) Oral daily  lactated ringers. 1000 milliLiter(s) (100 mL/Hr) IV Continuous <Continuous>  multivitamin 1 Tablet(s) Oral daily  pantoprazole  Injectable 40 milliGRAM(s) IV Push two times a day  PHENobarbital Injectable 260 milliGRAM(s) IV Push every 8 hours  thiamine 100 milliGRAM(s) Oral daily    MEDICATIONS  (PRN):  acetaminophen   Tablet .. 650 milliGRAM(s) Oral every 6 hours PRN Temp greater or equal to 38C (100.4F), Mild Pain (1 - 3)  haloperidol    Injectable 5 milliGRAM(s) IV Push every 6 hours PRN Agitation      ## Vitals:  ICU Vital Signs Last 24 Hrs  T(C): 37.7 (16 Sep 2020 07:26), Max: 38.2 (16 Sep 2020 05:00)  T(F): 99.9 (16 Sep 2020 07:26), Max: 100.7 (16 Sep 2020 05:00)  HR: 89 (16 Sep 2020 07:30) (62 - 108)  BP: 140/94 (16 Sep 2020 07:30) (90/67 - 157/107)  BP(mean): 102 (16 Sep 2020 07:30) (73 - 119)  RR: 19 (16 Sep 2020 07:30) (11 - 27)  SpO2: 100% (16 Sep 2020 07:30) (88% - 100%)      I&O's Summary    15 Sep 2020 07:01  -  16 Sep 2020 07:00  --------------------------------------------------------  IN: 3021.7 mL / OUT: 2250 mL / NET: 771.7 mL        ## P/E:  Gen: lying comfortably in bed in no apparent distress, awake  Neuro: alert and oriented to person but not to place and time  HEENT: PERRL, clear oropharynx, supple neck  Resp: clear to auscultation B/L   CVS: RRR, S1S2 with no murmur or gallop  Abd: soft NT/ND +BS  Ext: warm extremities, distal pulses present, no edema or cyanosis    CENTRAL LINE: [ ] YES [x] NO  LOCATION:   DATE INSERTED:  REMOVE: [ ] YES [ ] NO      CHAN: [ ] YES [x] NO    DATE INSERTED:  REMOVE:  [ ] YES [ ] NO      A-LINE:  [ ] YES [x] NO  LOCATION:   DATE INSERTED:  REMOVE:  [ ] YES [ ] NO  EXPLAIN:    GLOBAL ISSUE/BEST PRACTICE:  Analgesia: n/a  Sedation: precedex  HOB elevation: yes  Stress ulcer prophylaxis: protonix  VTE prophylaxis: lovenox  Oral Care: n/a  Glycemic control: n/a  Nutrition: if awake enough encourage PO intake    CODE STATUS: [x] full code  [ ] DNR  [ ] DNI  [ ] SYEDA  Goals of care discussion: [ ] yes

## 2020-09-16 NOTE — PROGRESS NOTE BEHAVIORAL HEALTH - NSBHCONSULTRECOMMENDOTHER_PSY_A_CORE FT
started reducing the phenobarb / taper down daily: Patient is still on phenobarb 260mg IVP q6hrs since 9/11/2020 so would reduce to 260mg IVP q8hrs today, 260mg IVP BID tomorrow; then 260mg IVP qd split into 130mg IVP bid then the final day 130mg qd and stop. started reducing the phenobarb / taper down daily: started at phenobarb 260mg IVP q6hrs, reduced to 260mg IVP q8hrs, then 260mg IVP BID; followed by 260mg IVP qd split into 130mg IVP bid then the final day 130mg qd and stop.

## 2020-09-16 NOTE — PROGRESS NOTE ADULT - SUBJECTIVE AND OBJECTIVE BOX
Patient is a 55y old  Male who presents with a chief complaint of abdominal pain (16 Sep 2020 08:14)      HPI:  Pt is a 56 y/o male w/ ETOH abuse w/frequent admissions,  GERD, HLD, alcoholic gastritis/esophagitis, PUD, hx of hypoxic respiratory failure requiring intubation due to aspiration PNA comes w/abdominal pain mostly in epigastric area and also reports emesis states coffee ground color.  Pt was last admitted 2 months ago for abdominal pains.  Reports pain mostly in epigastric area, worse w/eating for last 4 days w/decreased po intake and mostly just drinking as a result over this time. Usually drinks vodka almost a bottle on regular basis. no fever, chills, sob, cp, palpitaions, +n/+v/-d/-c no travesl or sick contacts. (10 Sep 2020 20:33)      INTERVAL HPI/OVERNIGHT EVENTS:  CCU #5  Lethargic but better today. The nurse denies melena, hematochezia, hematemesis, nausea, vomiting, abdominal pain, constipation, diarrhea, or change in bowel movements     MEDICATIONS  (STANDING):  atorvastatin 20 milliGRAM(s) Oral at bedtime  chlorhexidine 4% Liquid 1 Application(s) Topical <User Schedule>  dexMEDEtomidine Infusion 0.2 MICROgram(s)/kG/Hr (3.76 mL/Hr) IV Continuous <Continuous>  enoxaparin Injectable 40 milliGRAM(s) SubCutaneous daily  folic acid 1 milliGRAM(s) Oral daily  lactated ringers. 1000 milliLiter(s) (100 mL/Hr) IV Continuous <Continuous>  multivitamin 1 Tablet(s) Oral daily  pantoprazole  Injectable 40 milliGRAM(s) IV Push two times a day  PHENobarbital Injectable 260 milliGRAM(s) IV Push once  thiamine 100 milliGRAM(s) Oral daily    MEDICATIONS  (PRN):  acetaminophen   Tablet .. 650 milliGRAM(s) Oral every 6 hours PRN Temp greater or equal to 38C (100.4F), Mild Pain (1 - 3)  haloperidol    Injectable 5 milliGRAM(s) IV Push every 6 hours PRN Agitation      FAMILY HISTORY:  No pertinent family history in first degree relatives (Father, Mother)        Allergies    No Known Allergies    Intolerances        PMH/PSH:  DTs (delirium tremens)    Substance abuse    Gastritis    EtOH dependence    Mixed hyperlipidemia    PUD (peptic ulcer disease)    Alcohol abuse    No significant past surgical history          REVIEW OF SYSTEMS:  CONSTITUTIONAL: No fever, weight loss,   EYES: No eye pain, visual disturbances, or discharge  ENMT:  No difficulty hearing, tinnitus, vertigo; No sinus or throat pain  NECK: No pain or stiffness  BREASTS: No pain, masses, or nipple discharge  RESPIRATORY: No cough, wheezing, chills or hemoptysis; No shortness of breath  CARDIOVASCULAR: No chest pain, palpitations, dizziness, or leg swelling  GASTROINTESTINAL: See above  GENITOURINARY: No dysuria, frequency, hematuria, or incontinence  NEUROLOGICAL: No headaches, memory loss, loss of strength, numbness, or tremors  SKIN: No itching, burning, rashes, or lesions   LYMPH NODES: No enlarged glands  ENDOCRINE: No heat or cold intolerance; No hair loss  MUSCULOSKELETAL: No joint pain or swelling; No muscle, back, or extremity pain  PSYCHIATRIC: No depression, anxiety, mood swings, or difficulty sleeping  HEME/LYMPH: No easy bruising, or bleeding gums  ALLERGY AND IMMUNOLOGIC: No hives or eczema    Vital Signs Last 24 Hrs  T(C): 37.7 (16 Sep 2020 07:26), Max: 38.2 (16 Sep 2020 05:00)  T(F): 99.9 (16 Sep 2020 07:26), Max: 100.7 (16 Sep 2020 05:00)  HR: 81 (16 Sep 2020 10:00) (62 - 108)  BP: 95/64 (16 Sep 2020 10:00) (90/67 - 157/107)  BP(mean): 72 (16 Sep 2020 10:00) (72 - 119)  RR: 16 (16 Sep 2020 10:00) (11 - 27)  SpO2: 96% (16 Sep 2020 10:00) (88% - 100%)    PHYSICAL EXAM:  GENERAL: NAD, well-groomed, well-developed  HEAD:  Atraumatic, Normocephalic  EYES: EOMI, PERRLA, conjunctiva and sclera clear  NECK: Supple, No JVD, Normal thyroid  NERVOUS SYSTEM:  Lethargic but better. Fair concentration;   CHEST/LUNG: Clear to percussion bilaterally; No rales, rhonchi, wheezing, or rubs  HEART: Regular rate and rhythm; No murmurs, rubs, or gallops  ABDOMEN: Soft, Nontender, Nondistended; Bowel sounds present  EXTREMITIES:  2+ Peripheral Pulses, No clubbing, cyanosis, or edema  LYMPH: No lymphadenopathy noted  SKIN: No rashes or lesions    LAB                          12.5   4.86  )-----------( 183      ( 16 Sep 2020 05:22 )             38.4       CBC:  09-16 @ 05:22  WBC 4.86   Hgb 12.5   Hct 38.4   Plts 183  MCV 85.0  09-15 @ 04:04  WBC 5.37   Hgb 11.3   Hct 33.5   Plts 183  MCV 83.5  09-14 @ 05:19  WBC 7.77   Hgb 11.2   Hct 34.4   Plts 153  MCV 85.8  09-13 @ 03:55  WBC 9.00   Hgb 11.2   Hct 33.2   Plts 139  MCV 84.7  09-12 @ 05:41  WBC 5.75   Hgb 11.7   Hct 35.2   Plts 164  MCV 85.2  09-10 @ 15:31  WBC 9.77   Hgb 14.1   Hct 42.1   Plts 288  MCV 84.2      Chemistry:  09-16 @ 05:22  Na+ 138  K+ 3.7  Cl- 104  CO2 24  BUN 6  Cr 0.88     09-15 @ 04:04  Na+ 138  K+ 3.2  Cl- 105  CO2 23  BUN 6  Cr 0.76     09-14 @ 05:19  Na+ 141  K+ 3.1  Cl- 105  CO2 27  BUN 6  Cr 0.90     09-13 @ 03:55  Na+ 138  K+ 3.1  Cl- 105  CO2 25  BUN 8  Cr 0.71     09-12 @ 05:41  Na+ 140  K+ 3.6  Cl- 107  CO2 26  BUN 10  Cr 0.84     09-11 @ 18:29  Na+ 141  K+ 3.9  Cl- 106  CO2 26  BUN 11  Cr 0.92     09-10 @ 18:24  Na+ 140  K+ 4.3  Cl- 105  CO2 17  BUN 16  Cr 1.08     09-10 @ 15:31  Na+ 137  K+ 3.6  Cl- 93  CO2 19  BUN 19  Cr 1.41         Glucose, Serum: 72 mg/dL (09-16 @ 05:22)  Glucose, Serum: 93 mg/dL (09-15 @ 04:04)  Glucose, Serum: 104 mg/dL (09-14 @ 05:19)  Glucose, Serum: 108 mg/dL (09-13 @ 03:55)  Glucose, Serum: 114 mg/dL (09-12 @ 05:41)  Glucose, Serum: 94 mg/dL (09-11 @ 18:29)  Glucose, Serum: 89 mg/dL (09-10 @ 18:24)  Glucose, Serum: 101 mg/dL (09-10 @ 15:31)      16 Sep 2020 05:22    138    |  104    |  6      ----------------------------<  72     3.7     |  24     |  0.88   15 Sep 2020 04:04    138    |  105    |  6      ----------------------------<  93     3.2     |  23     |  0.76   14 Sep 2020 05:19    141    |  105    |  6      ----------------------------<  104    3.1     |  27     |  0.90   13 Sep 2020 03:55    138    |  105    |  8      ----------------------------<  108    3.1     |  25     |  0.71   12 Sep 2020 05:41    140    |  107    |  10     ----------------------------<  114    3.6     |  26     |  0.84   11 Sep 2020 18:29    141    |  106    |  11     ----------------------------<  94     3.9     |  26     |  0.92   10 Sep 2020 18:24    140    |  105    |  16     ----------------------------<  89     4.3     |  17     |  1.08     Ca    9.4        16 Sep 2020 05:22  Ca    8.9        15 Sep 2020 04:04  Ca    8.7        14 Sep 2020 05:19  Ca    8.3        13 Sep 2020 03:55  Ca    8.5        12 Sep 2020 05:41  Ca    8.3        11 Sep 2020 18:29  Ca    7.5        10 Sep 2020 18:24  Phos  3.0       16 Sep 2020 05:22  Phos  3.2       15 Sep 2020 04:04  Phos  2.4       14 Sep 2020 05:19  Phos  2.8       13 Sep 2020 03:55  Phos  2.2       12 Sep 2020 05:41  Phos  2.5       11 Sep 2020 18:29  Phos  1.5       11 Sep 2020 08:14  Mg     2.0       16 Sep 2020 05:22  Mg     1.9       15 Sep 2020 04:04  Mg     2.0       14 Sep 2020 05:19  Mg     2.1       13 Sep 2020 03:55  Mg     2.3       12 Sep 2020 05:41  Mg     2.5       11 Sep 2020 18:29  Mg     2.3       11 Sep 2020 08:14    TPro  8.5    /  Alb  3.0    /  TBili  0.5    /  DBili  x      /  AST  18     /  ALT  18     /  AlkPhos  48     16 Sep 2020 05:22  TPro  7.4    /  Alb  2.9    /  TBili  0.5    /  DBili  x      /  AST  23     /  ALT  21     /  AlkPhos  46     14 Sep 2020 05:19  TPro  6.9    /  Alb  2.8    /  TBili  0.9    /  DBili  x      /  AST  26     /  ALT  20     /  AlkPhos  43     13 Sep 2020 03:55  TPro  7.2    /  Alb  3.3    /  TBili  1.3    /  DBili  x      /  AST  44     /  ALT  27     /  AlkPhos  43     12 Sep 2020 05:41  TPro  9.5    /  Alb  4.4    /  TBili  0.7    /  DBili  x      /  AST  51     /  ALT  32     /  AlkPhos  60     10 Sep 2020 15:31              CAPILLARY BLOOD GLUCOSE          C-Reactive Protein, Serum: <0.10 mg/dL (09-10 @ 20:28)      RADIOLOGY & ADDITIONAL TESTS:    Imaging Personally Reviewed:  [ ] YES  [ ] NO    Consultant(s) Notes Reviewed:  [ ] YES  [ ] NO    Care Discussed with Consultants/Other Providers [ ] YES  [ ] NO

## 2020-09-17 LAB
ANION GAP SERPL CALC-SCNC: 10 MMOL/L — SIGNIFICANT CHANGE UP (ref 5–17)
BUN SERPL-MCNC: 6 MG/DL — LOW (ref 7–23)
CALCIUM SERPL-MCNC: 8.9 MG/DL — SIGNIFICANT CHANGE UP (ref 8.5–10.1)
CHLORIDE SERPL-SCNC: 105 MMOL/L — SIGNIFICANT CHANGE UP (ref 96–108)
CO2 SERPL-SCNC: 24 MMOL/L — SIGNIFICANT CHANGE UP (ref 22–31)
CREAT SERPL-MCNC: 0.82 MG/DL — SIGNIFICANT CHANGE UP (ref 0.5–1.3)
GLUCOSE SERPL-MCNC: 88 MG/DL — SIGNIFICANT CHANGE UP (ref 70–99)
HCT VFR BLD CALC: 33.8 % — LOW (ref 39–50)
HGB BLD-MCNC: 11.3 G/DL — LOW (ref 13–17)
MAGNESIUM SERPL-MCNC: 2 MG/DL — SIGNIFICANT CHANGE UP (ref 1.6–2.6)
MCHC RBC-ENTMCNC: 28.1 PG — SIGNIFICANT CHANGE UP (ref 27–34)
MCHC RBC-ENTMCNC: 33.4 GM/DL — SIGNIFICANT CHANGE UP (ref 32–36)
MCV RBC AUTO: 84.1 FL — SIGNIFICANT CHANGE UP (ref 80–100)
NRBC # BLD: 0 /100 WBCS — SIGNIFICANT CHANGE UP (ref 0–0)
PHOSPHATE SERPL-MCNC: 3.3 MG/DL — SIGNIFICANT CHANGE UP (ref 2.5–4.5)
PLATELET # BLD AUTO: 203 K/UL — SIGNIFICANT CHANGE UP (ref 150–400)
POTASSIUM SERPL-MCNC: 3.3 MMOL/L — LOW (ref 3.5–5.3)
POTASSIUM SERPL-SCNC: 3.3 MMOL/L — LOW (ref 3.5–5.3)
RBC # BLD: 4.02 M/UL — LOW (ref 4.2–5.8)
RBC # FLD: 12.4 % — SIGNIFICANT CHANGE UP (ref 10.3–14.5)
SODIUM SERPL-SCNC: 139 MMOL/L — SIGNIFICANT CHANGE UP (ref 135–145)
WBC # BLD: 3.62 K/UL — LOW (ref 3.8–10.5)
WBC # FLD AUTO: 3.62 K/UL — LOW (ref 3.8–10.5)

## 2020-09-17 PROCEDURE — 99233 SBSQ HOSP IP/OBS HIGH 50: CPT

## 2020-09-17 PROCEDURE — 99231 SBSQ HOSP IP/OBS SF/LOW 25: CPT

## 2020-09-17 RX ORDER — PHENOBARBITAL 60 MG
32.4 TABLET ORAL
Refills: 0 | Status: DISCONTINUED | OUTPATIENT
Start: 2020-09-18 | End: 2020-09-18

## 2020-09-17 RX ORDER — PHENOBARBITAL 60 MG
32.4 TABLET ORAL ONCE
Refills: 0 | Status: DISCONTINUED | OUTPATIENT
Start: 2020-09-19 | End: 2020-09-19

## 2020-09-17 RX ORDER — POTASSIUM CHLORIDE 20 MEQ
10 PACKET (EA) ORAL
Refills: 0 | Status: DISCONTINUED | OUTPATIENT
Start: 2020-09-17 | End: 2020-09-17

## 2020-09-17 RX ORDER — PHENOBARBITAL 60 MG
64.8 TABLET ORAL ONCE
Refills: 0 | Status: DISCONTINUED | OUTPATIENT
Start: 2020-09-17 | End: 2020-09-17

## 2020-09-17 RX ORDER — POTASSIUM CHLORIDE 20 MEQ
40 PACKET (EA) ORAL ONCE
Refills: 0 | Status: COMPLETED | OUTPATIENT
Start: 2020-09-17 | End: 2020-09-17

## 2020-09-17 RX ADMIN — Medication 1 TABLET(S): at 11:56

## 2020-09-17 RX ADMIN — Medication 650 MILLIGRAM(S): at 17:10

## 2020-09-17 RX ADMIN — Medication 64.8 MILLIGRAM(S): at 17:07

## 2020-09-17 RX ADMIN — Medication 40 MILLIEQUIVALENT(S): at 05:42

## 2020-09-17 RX ADMIN — PANTOPRAZOLE SODIUM 40 MILLIGRAM(S): 20 TABLET, DELAYED RELEASE ORAL at 05:42

## 2020-09-17 RX ADMIN — ATORVASTATIN CALCIUM 20 MILLIGRAM(S): 80 TABLET, FILM COATED ORAL at 21:09

## 2020-09-17 RX ADMIN — Medication 100 MILLIGRAM(S): at 11:56

## 2020-09-17 RX ADMIN — PANTOPRAZOLE SODIUM 40 MILLIGRAM(S): 20 TABLET, DELAYED RELEASE ORAL at 17:07

## 2020-09-17 RX ADMIN — HALOPERIDOL DECANOATE 5 MILLIGRAM(S): 100 INJECTION INTRAMUSCULAR at 23:38

## 2020-09-17 RX ADMIN — DEXMEDETOMIDINE HYDROCHLORIDE IN 0.9% SODIUM CHLORIDE 3.76 MICROGRAM(S)/KG/HR: 4 INJECTION INTRAVENOUS at 06:09

## 2020-09-17 RX ADMIN — SENNA PLUS 2 TABLET(S): 8.6 TABLET ORAL at 21:07

## 2020-09-17 RX ADMIN — Medication 1 MILLIGRAM(S): at 11:56

## 2020-09-17 RX ADMIN — DEXMEDETOMIDINE HYDROCHLORIDE IN 0.9% SODIUM CHLORIDE 3.76 MICROGRAM(S)/KG/HR: 4 INJECTION INTRAVENOUS at 05:41

## 2020-09-17 RX ADMIN — Medication 100 MILLIGRAM(S): at 21:07

## 2020-09-17 RX ADMIN — CHLORHEXIDINE GLUCONATE 1 APPLICATION(S): 213 SOLUTION TOPICAL at 05:42

## 2020-09-17 RX ADMIN — Medication 130 MILLIGRAM(S): at 06:08

## 2020-09-17 NOTE — PROGRESS NOTE ADULT - SUBJECTIVE AND OBJECTIVE BOX
Patient is a 55y old  Male who presents with a chief complaint of abdominal pain (16 Sep 2020 11:57)      HPI:  Pt is a 56 y/o male w/ ETOH abuse w/frequent admissions,  GERD, HLD, alcoholic gastritis/esophagitis, PUD, hx of hypoxic respiratory failure requiring intubation due to aspiration PNA comes w/abdominal pain mostly in epigastric area and also reports emesis states coffee ground color.  Pt was last admitted 2 months ago for abdominal pains.  Reports pain mostly in epigastric area, worse w/eating for last 4 days w/decreased po intake and mostly just drinking as a result over this time. Usually drinks vodka almost a bottle on regular basis. no fever, chills, sob, cp, palpitaions, +n/+v/-d/-c no travesl or sick contacts. (10 Sep 2020 20:33)      INTERVAL HPI/OVERNIGHT EVENTS:  The patient /nurse denies melena, hematochezia, hematemesis, nausea, vomiting, abdominal pain, constipation, diarrhea, or change in bowel movements     MEDICATIONS  (STANDING):  atorvastatin 20 milliGRAM(s) Oral at bedtime  chlorhexidine 4% Liquid 1 Application(s) Topical <User Schedule>  dexMEDEtomidine Infusion 0.2 MICROgram(s)/kG/Hr (3.76 mL/Hr) IV Continuous <Continuous>  enoxaparin Injectable 40 milliGRAM(s) SubCutaneous daily  folic acid 1 milliGRAM(s) Oral daily  multivitamin 1 Tablet(s) Oral daily  pantoprazole  Injectable 40 milliGRAM(s) IV Push two times a day  PHENobarbital Injectable 130 milliGRAM(s) IV Push two times a day  senna 2 Tablet(s) Oral at bedtime  thiamine 100 milliGRAM(s) Oral daily    MEDICATIONS  (PRN):  acetaminophen   Tablet .. 650 milliGRAM(s) Oral every 6 hours PRN Temp greater or equal to 38C (100.4F), Mild Pain (1 - 3)  haloperidol    Injectable 5 milliGRAM(s) IV Push every 6 hours PRN Agitation      FAMILY HISTORY:  No pertinent family history in first degree relatives (Father, Mother)        Allergies    No Known Allergies    Intolerances        PMH/PSH:  DTs (delirium tremens)    Substance abuse    Gastritis    EtOH dependence    Mixed hyperlipidemia    PUD (peptic ulcer disease)    Alcohol abuse    No significant past surgical history          REVIEW OF SYSTEMS:  CONSTITUTIONAL: No fever, weight loss,   EYES: No eye pain, visual disturbances, or discharge  ENMT:  No difficulty hearing, tinnitus, vertigo; No sinus or throat pain  NECK: No pain or stiffness  BREASTS: No pain, masses, or nipple discharge  RESPIRATORY: No cough, wheezing, chills or hemoptysis; No shortness of breath  CARDIOVASCULAR: No chest pain, palpitations, dizziness, or leg swelling  GASTROINTESTINAL: See above  GENITOURINARY: No dysuria, frequency, hematuria, or incontinence  NEUROLOGICAL: No headaches, memory loss, loss of strength, numbness, or tremors  SKIN: No itching, burning, rashes, or lesions   LYMPH NODES: No enlarged glands  ENDOCRINE: No heat or cold intolerance; No hair loss  MUSCULOSKELETAL: No joint pain or swelling; No muscle, back, or extremity pain  PSYCHIATRIC: No depression, anxiety, mood swings, or difficulty sleeping  HEME/LYMPH: No easy bruising, or bleeding gums  ALLERGY AND IMMUNOLOGIC: No hives or eczema    Vital Signs Last 24 Hrs  T(C): 36.8 (17 Sep 2020 12:00), Max: 36.8 (16 Sep 2020 23:25)  T(F): 98.2 (17 Sep 2020 12:00), Max: 98.2 (16 Sep 2020 23:25)  HR: 123 (17 Sep 2020 12:00) (64 - 125)  BP: 156/108 (17 Sep 2020 12:00) (117/79 - 174/94)  BP(mean): 119 (17 Sep 2020 12:00) (89 - 121)  RR: 22 (17 Sep 2020 12:00) (11 - 25)  SpO2: 97% (17 Sep 2020 12:00) (94% - 100%)    PHYSICAL EXAM:  GENERAL: NAD, well-groomed, well-developed  HEAD:  Atraumatic, Normocephalic  EYES: EOMI, PERRLA, conjunctiva and sclera clear  NECK: Supple, No JVD, Normal thyroid  NERVOUS SYSTEM:  Alert & Oriented X2, Fair concentration;   CHEST/LUNG: Clear to percussion bilaterally; No rales, rhonchi, wheezing, or rubs  HEART: Regular rate and rhythm; No murmurs, rubs, or gallops  ABDOMEN: Soft, Nontender, Nondistended; Bowel sounds present  EXTREMITIES:  2+ Peripheral Pulses, No clubbing, cyanosis, or edema  LYMPH: No lymphadenopathy noted  SKIN: No rashes or lesions    LAB                          11.3   3.62  )-----------( 203      ( 17 Sep 2020 03:18 )             33.8       CBC:  09-17 @ 03:18  WBC 3.62   Hgb 11.3   Hct 33.8   Plts 203  MCV 84.1  09-16 @ 05:22  WBC 4.86   Hgb 12.5   Hct 38.4   Plts 183  MCV 85.0  09-15 @ 04:04  WBC 5.37   Hgb 11.3   Hct 33.5   Plts 183  MCV 83.5  09-14 @ 05:19  WBC 7.77   Hgb 11.2   Hct 34.4   Plts 153  MCV 85.8  09-13 @ 03:55  WBC 9.00   Hgb 11.2   Hct 33.2   Plts 139  MCV 84.7  09-12 @ 05:41  WBC 5.75   Hgb 11.7   Hct 35.2   Plts 164  MCV 85.2  09-10 @ 15:31  WBC 9.77   Hgb 14.1   Hct 42.1   Plts 288  MCV 84.2      Chemistry:  09-17 @ 03:18  Na+ 139  K+ 3.3  Cl- 105  CO2 24  BUN 6  Cr 0.82     09-16 @ 05:22  Na+ 138  K+ 3.7  Cl- 104  CO2 24  BUN 6  Cr 0.88     09-15 @ 04:04  Na+ 138  K+ 3.2  Cl- 105  CO2 23  BUN 6  Cr 0.76     09-14 @ 05:19  Na+ 141  K+ 3.1  Cl- 105  CO2 27  BUN 6  Cr 0.90     09-13 @ 03:55  Na+ 138  K+ 3.1  Cl- 105  CO2 25  BUN 8  Cr 0.71     09-12 @ 05:41  Na+ 140  K+ 3.6  Cl- 107  CO2 26  BUN 10  Cr 0.84     09-11 @ 18:29  Na+ 141  K+ 3.9  Cl- 106  CO2 26  BUN 11  Cr 0.92     09-10 @ 18:24  Na+ 140  K+ 4.3  Cl- 105  CO2 17  BUN 16  Cr 1.08     09-10 @ 15:31  Na+ 137  K+ 3.6  Cl- 93  CO2 19  BUN 19  Cr 1.41         Glucose, Serum: 88 mg/dL (09-17 @ 03:18)  Glucose, Serum: 72 mg/dL (09-16 @ 05:22)  Glucose, Serum: 93 mg/dL (09-15 @ 04:04)  Glucose, Serum: 104 mg/dL (09-14 @ 05:19)  Glucose, Serum: 108 mg/dL (09-13 @ 03:55)  Glucose, Serum: 114 mg/dL (09-12 @ 05:41)  Glucose, Serum: 94 mg/dL (09-11 @ 18:29)  Glucose, Serum: 89 mg/dL (09-10 @ 18:24)  Glucose, Serum: 101 mg/dL (09-10 @ 15:31)      17 Sep 2020 03:18    139    |  105    |  6      ----------------------------<  88     3.3     |  24     |  0.82   16 Sep 2020 05:22    138    |  104    |  6      ----------------------------<  72     3.7     |  24     |  0.88   15 Sep 2020 04:04    138    |  105    |  6      ----------------------------<  93     3.2     |  23     |  0.76   14 Sep 2020 05:19    141    |  105    |  6      ----------------------------<  104    3.1     |  27     |  0.90   13 Sep 2020 03:55    138    |  105    |  8      ----------------------------<  108    3.1     |  25     |  0.71   12 Sep 2020 05:41    140    |  107    |  10     ----------------------------<  114    3.6     |  26     |  0.84   11 Sep 2020 18:29    141    |  106    |  11     ----------------------------<  94     3.9     |  26     |  0.92     Ca    8.9        17 Sep 2020 03:18  Ca    9.4        16 Sep 2020 05:22  Ca    8.9        15 Sep 2020 04:04  Ca    8.7        14 Sep 2020 05:19  Ca    8.3        13 Sep 2020 03:55  Ca    8.5        12 Sep 2020 05:41  Ca    8.3        11 Sep 2020 18:29  Phos  3.3       17 Sep 2020 03:18  Phos  3.0       16 Sep 2020 05:22  Phos  3.2       15 Sep 2020 04:04  Phos  2.4       14 Sep 2020 05:19  Phos  2.8       13 Sep 2020 03:55  Phos  2.2       12 Sep 2020 05:41  Phos  2.5       11 Sep 2020 18:29  Mg     2.0       17 Sep 2020 03:18  Mg     2.0       16 Sep 2020 05:22  Mg     1.9       15 Sep 2020 04:04  Mg     2.0       14 Sep 2020 05:19  Mg     2.1       13 Sep 2020 03:55  Mg     2.3       12 Sep 2020 05:41  Mg     2.5       11 Sep 2020 18:29    TPro  8.5    /  Alb  3.0    /  TBili  0.5    /  DBili  x      /  AST  18     /  ALT  18     /  AlkPhos  48     16 Sep 2020 05:22  TPro  7.4    /  Alb  2.9    /  TBili  0.5    /  DBili  x      /  AST  23     /  ALT  21     /  AlkPhos  46     14 Sep 2020 05:19  TPro  6.9    /  Alb  2.8    /  TBili  0.9    /  DBili  x      /  AST  26     /  ALT  20     /  AlkPhos  43     13 Sep 2020 03:55  TPro  7.2    /  Alb  3.3    /  TBili  1.3    /  DBili  x      /  AST  44     /  ALT  27     /  AlkPhos  43     12 Sep 2020 05:41  TPro  9.5    /  Alb  4.4    /  TBili  0.7    /  DBili  x      /  AST  51     /  ALT  32     /  AlkPhos  60     10 Sep 2020 15:31              CAPILLARY BLOOD GLUCOSE          C-Reactive Protein, Serum: <0.10 mg/dL (09-10 @ 20:28)      RADIOLOGY & ADDITIONAL TESTS:    Imaging Personally Reviewed:  [ ] YES  [ ] NO    Consultant(s) Notes Reviewed:  [ ] YES  [ ] NO    Care Discussed with Consultants/Other Providers [ ] YES  [ ] NO

## 2020-09-17 NOTE — PROGRESS NOTE ADULT - SUBJECTIVE AND OBJECTIVE BOX
Interval Events:  Mental status continues to improve  No complaints today    REVIEW OF SYSTEMS:  [x] All other systems negative  [ ] Unable to assess ROS because ________    OBJECTIVE:  ICU Vital Signs Last 24 Hrs  T(C): 36.8 (17 Sep 2020 12:00), Max: 36.8 (16 Sep 2020 23:25)  T(F): 98.2 (17 Sep 2020 12:00), Max: 98.2 (16 Sep 2020 23:25)  HR: 109 (17 Sep 2020 16:35) (64 - 125)  BP: 150/95 (17 Sep 2020 16:00) (117/79 - 174/94)  BP(mean): 106 (17 Sep 2020 16:00) (89 - 121)  ABP: --  ABP(mean): --  RR: 21 (17 Sep 2020 16:35) (12 - 26)  SpO2: 97% (17 Sep 2020 16:35) (94% - 100%)        09-16 @ 07:01  -  09-17 @ 07:00  --------------------------------------------------------  IN: 3208 mL / OUT: 1150 mL / NET: 2058 mL    09-17 @ 07:01  -  09-17 @ 17:10  --------------------------------------------------------  IN: 771.2 mL / OUT: 300 mL / NET: 471.2 mL      CAPILLARY BLOOD GLUCOSE          PHYSICAL EXAM:  General: NAD  HEENT: NC/AT  Neck: Supple  Respiratory: CTAB  Cardiovascular: RRR, no murmur  Abdomen: Soft  Extremities: Warm  Skin: Intact  Neurological: AXOx2, intermittently confused  Psychiatry: [facility] MEDICATIONS:  enoxaparin Injectable 40 milliGRAM(s) SubCutaneous daily        atorvastatin 20 milliGRAM(s) Oral at bedtime    benzonatate 100 milliGRAM(s) Oral three times a day    acetaminophen   Tablet .. 650 milliGRAM(s) Oral every 6 hours PRN  haloperidol    Injectable 5 milliGRAM(s) IV Push every 6 hours PRN    pantoprazole  Injectable 40 milliGRAM(s) IV Push two times a day  senna 2 Tablet(s) Oral at bedtime        folic acid 1 milliGRAM(s) Oral daily  multivitamin 1 Tablet(s) Oral daily  thiamine 100 milliGRAM(s) Oral daily            LABS:                        11.3   3.62  )-----------( 203      ( 17 Sep 2020 03:18 )             33.8     Hgb Trend: 11.3<--, 12.5<--, 11.3<--, 11.2<--, 11.2<--  09-17    139  |  105  |  6<L>  ----------------------------<  88  3.3<L>   |  24  |  0.82    Ca    8.9      17 Sep 2020 03:18  Phos  3.3     09-17  Mg     2.0     09-17    TPro  8.5<H>  /  Alb  3.0<L>  /  TBili  0.5  /  DBili  x   /  AST  18  /  ALT  18  /  AlkPhos  48  09-16    Creatinine Trend: 0.82<--, 0.88<--, 0.76<--, 0.90<--, 0.71<--, 0.84<--            MICROBIOLOGY:     RADIOLOGY:  [ ] Reviewed and interpreted by me    ASSESSMENT AND RECOMMENDATION:

## 2020-09-17 NOTE — PROGRESS NOTE BEHAVIORAL HEALTH - OTHER
superficially cooperative improved improving tenuous tremors not noted deferred at this time halting 'ok" looks less confused residual confusion unable to ascertain due to limitation of exam does not seem to be responding ot internal stimuli

## 2020-09-17 NOTE — CHART NOTE - NSCHARTNOTEFT_GEN_A_CORE
55M PMH long standing ETOH abuse w/frequent admissions {pt was last admitted 2 months ago for abdominal pains) for alcohol withdrawal, GERD, HLD, alcoholic gastritis/esophagitis, PUD, hx of hypoxic respiratory failure requiring intubation due to aspiration PNA, most recently intubated April 2020 for hypoxic resp failure secondary to asp PNA with course complicated by septic shock and ROSA. He has had multiple admissions since then for abdominal pain secondary to alcoholic gastritis with hemorrhage. Presented again with abdominal pain and coffee ground emesis. Labs initially showed elevated lactate of 10.9 which corrected after IVF hydration. He was admitted to medical floor for alcohol intoxication with blood alcohol level in the 200s and alcohol gastritis with upper GI bleed. On 9/10, the patient was transferred to ICU with worsening agitation on CIWA and delirium. Seen by Dr. Hernandez.  The patient is currently stable and making gradual improvement from alcohol withdrawal. Phenobarbital changed to PO and Precedex DC'd  Patient to cont with thiamine, MVI, folic acid.  No further episodes of coffee ground emesis cont with Protonix daily PO. Patient seen and examined by ICU attending and stable for transfer to medicine service.     Report given to Dr. Alvaro Sutton, NP-C  critical care

## 2020-09-17 NOTE — PROGRESS NOTE ADULT - ATTENDING COMMENTS
As above
55 M with HLD, GERD, EtOH use disorder with frequent admissions for EtOH withdrawal c/b gastritis/esophagits presented with epigastric pain, coffee ground emesis, and lactic acidosis (10.9). Course was complicated by worsenign agitation and delirium. Transferred to ICU for airway monitoring.    NEURO: Remains delirious, intermittently responding to verbal and painful stimuli, continue phenobarb, wean precedex  CVS: HD stable  PULM: Saturating well on minimal oxygen  GI: No further episodes of coffee ground emesis, Hgb stable, continue PPI BID, GI follow up  RENAL: Creatinine stable, urine output acceptable  ENDO: FS acceptable  ID: No evidence of active infection  HEME: Hgb stable  PPX: Lovenox  Diet: electrolytes supplemented, thiamine, folate, multivitamin, diet when awake and alert to tolerate
Pt is a 54 yo M with h/o HLD, GERD, ETOH abuse with frequent admissions 2 to alcohol withdrawal, alcoholic gastritis/esophagitis, PUD and hypoxic respiratory failure requiring intubation due to aspiration PNA. Pt presents with epigastric abdominal pain and coffee ground emesis, noted to have elevated lactate of 10.9 upon admission. Pt admitted to [facility] with resolution of lactic acidosis but hospital course complicated by severe agitation, delirium requiring transfer to ICU for sedation and airway monitoring.     Resp: Cont airway monitoring  HEME: Hb stable  FEN: NPO/ Replace K and Phos ; pt hypokalemic and hypophosphatemic/ Daily Thiamine, MVI and Folate   GI: Cont PPI/ GI f/u prn  Neuro/Psych: Cont Phenobarb and may decrease Precedex
55 M with HLD, GERD, EtOH use disorder with frequent admissions for EtOH withdrawal c/b gastritis/esophagits presented with epigastric pain, coffee ground emesis, and lactic acidosis (10.9). Course was complicated by worsening agitation and delirium. Transferred to ICU for airway monitoring.    NEURO: Mental status improved today - conversing - has episodes of agitation responded to haldol - decrease precedex, phenobarb taper  CVS: HD stable  PULM: Saturating well on minimal oxygen  GI: No further episodes of coffee ground emesis, Hgb stable, continue PPI BID  RENAL: Creatinine stable, urine output acceptable  ENDO: FS acceptable  ID: No evidence of active infection  HEME: Hgb stable  PPX: Lovenox  Diet: electrolytes supplemented, thiamine, folate, multivitamin, diet when awake and alert to tolerate.

## 2020-09-17 NOTE — PROGRESS NOTE ADULT - ASSESSMENT
PI:    · Chief Complaint: The patient is a 55y Male complaining of abdominal pain.  · HPI Objective Statement: 55 years old male by ems c/o epigastric and mid abd pain vomiting coffee ground since yesterday and c/o feeling hot all over his body. Pt sts he drinks alcohol three to four times a week and last drink was 4 days ago. Pt denies headache, dizziness, blurred visions, light sensitivities, focal/distal weakness or numbness, cough, sob, chest pain, dysuria, or irregular bowel movements.  ----------------- As Above ---------------------- Seen earlier today  Patient is a poor historian. Patient states that he has been having coffee ground emesis over the past 3 - 4 days. No blood but very dark. ETOH abuse (+) Upper abdominal pain. No melena.  Denies NSAIDs.   Patient has been admitted for the same reason multiple times over he past few years. Last EGD was 5/ 2020- gastritis.   See labs  / CT scan      Upper GI bleed - Probably alcoholic gastritis HGB 11.3  Stable  -1) IV PPI  2) Advance diet as tolerated   3) f/u labs 4) ?EGD 5) Zofran PRN PI:    · Chief Complaint: The patient is a 55y Male complaining of abdominal pain.  · HPI Objective Statement: 55 years old male by ems c/o epigastric and mid abd pain vomiting coffee ground since yesterday and c/o feeling hot all over his body. Pt sts he drinks alcohol three to four times a week and last drink was 4 days ago. Pt denies headache, dizziness, blurred visions, light sensitivities, focal/distal weakness or numbness, cough, sob, chest pain, dysuria, or irregular bowel movements.  ----------------- As Above ---------------------- Seen earlier today  Patient is a poor historian. Patient states that he has been having coffee ground emesis over the past 3 - 4 days. No blood but very dark. ETOH abuse (+) Upper abdominal pain. No melena.  Denies NSAIDs.   Patient has been admitted for the same reason multiple times over he past few years. Last EGD was 5/ 2020- gastritis.   See labs  / CT scan      Upper GI bleed - Probably alcoholic gastritis HGB 11.3  Stable On a regular diet  -Patient doing well. Will follow on a PRN basis

## 2020-09-17 NOTE — PROGRESS NOTE ADULT - ASSESSMENT
55 M with HLD, GERD, EtOH use disorder with frequent admissions for EtOH withdrawal c/b gastritis/esophagits presented with epigastric pain, coffee ground emesis, and lactic acidosis (10.9). Course was complicated by worsening agitation and delirium. Transferred to ICU for airway monitoring. Gradual improvement.    NEURO: D/C precedex gtt. Phenobarb taper.  CVS: HD stable  PULM: Saturating well on minimal oxygen  GI: No further episodes of coffee ground emesis, Hgb stable, continue PPI BID  RENAL: Creatinine stable, urine output acceptable  ENDO: FS acceptable  ID: No evidence of active infection  HEME: Hgb stable  PPX: Lovenox  Diet: potassium supplemented, thiamine, folate, multivitamin, diet when awake and alert to tolerate.

## 2020-09-18 LAB
ANION GAP SERPL CALC-SCNC: 11 MMOL/L — SIGNIFICANT CHANGE UP (ref 5–17)
BUN SERPL-MCNC: 11 MG/DL — SIGNIFICANT CHANGE UP (ref 7–23)
CALCIUM SERPL-MCNC: 8.9 MG/DL — SIGNIFICANT CHANGE UP (ref 8.5–10.1)
CHLORIDE SERPL-SCNC: 107 MMOL/L — SIGNIFICANT CHANGE UP (ref 96–108)
CO2 SERPL-SCNC: 23 MMOL/L — SIGNIFICANT CHANGE UP (ref 22–31)
CREAT SERPL-MCNC: 0.95 MG/DL — SIGNIFICANT CHANGE UP (ref 0.5–1.3)
CULTURE RESULTS: SIGNIFICANT CHANGE UP
CULTURE RESULTS: SIGNIFICANT CHANGE UP
GLUCOSE SERPL-MCNC: 78 MG/DL — SIGNIFICANT CHANGE UP (ref 70–99)
HCT VFR BLD CALC: 33 % — LOW (ref 39–50)
HGB BLD-MCNC: 10.7 G/DL — LOW (ref 13–17)
MAGNESIUM SERPL-MCNC: 2.2 MG/DL — SIGNIFICANT CHANGE UP (ref 1.6–2.6)
MCHC RBC-ENTMCNC: 27.7 PG — SIGNIFICANT CHANGE UP (ref 27–34)
MCHC RBC-ENTMCNC: 32.4 GM/DL — SIGNIFICANT CHANGE UP (ref 32–36)
MCV RBC AUTO: 85.5 FL — SIGNIFICANT CHANGE UP (ref 80–100)
NRBC # BLD: 0 /100 WBCS — SIGNIFICANT CHANGE UP (ref 0–0)
PHOSPHATE SERPL-MCNC: 3.1 MG/DL — SIGNIFICANT CHANGE UP (ref 2.5–4.5)
PLATELET # BLD AUTO: 308 K/UL — SIGNIFICANT CHANGE UP (ref 150–400)
POTASSIUM SERPL-MCNC: 3.3 MMOL/L — LOW (ref 3.5–5.3)
POTASSIUM SERPL-SCNC: 3.3 MMOL/L — LOW (ref 3.5–5.3)
RBC # BLD: 3.86 M/UL — LOW (ref 4.2–5.8)
RBC # FLD: 12.5 % — SIGNIFICANT CHANGE UP (ref 10.3–14.5)
SODIUM SERPL-SCNC: 141 MMOL/L — SIGNIFICANT CHANGE UP (ref 135–145)
SPECIMEN SOURCE: SIGNIFICANT CHANGE UP
SPECIMEN SOURCE: SIGNIFICANT CHANGE UP
WBC # BLD: 4.62 K/UL — SIGNIFICANT CHANGE UP (ref 3.8–10.5)
WBC # FLD AUTO: 4.62 K/UL — SIGNIFICANT CHANGE UP (ref 3.8–10.5)

## 2020-09-18 PROCEDURE — 99231 SBSQ HOSP IP/OBS SF/LOW 25: CPT

## 2020-09-18 PROCEDURE — 99233 SBSQ HOSP IP/OBS HIGH 50: CPT

## 2020-09-18 RX ORDER — POTASSIUM CHLORIDE 20 MEQ
40 PACKET (EA) ORAL ONCE
Refills: 0 | Status: COMPLETED | OUTPATIENT
Start: 2020-09-18 | End: 2020-09-18

## 2020-09-18 RX ADMIN — PANTOPRAZOLE SODIUM 40 MILLIGRAM(S): 20 TABLET, DELAYED RELEASE ORAL at 18:18

## 2020-09-18 RX ADMIN — Medication 40 MILLIEQUIVALENT(S): at 12:15

## 2020-09-18 RX ADMIN — Medication 100 MILLIGRAM(S): at 14:34

## 2020-09-18 RX ADMIN — PANTOPRAZOLE SODIUM 40 MILLIGRAM(S): 20 TABLET, DELAYED RELEASE ORAL at 05:51

## 2020-09-18 RX ADMIN — Medication 100 MILLIGRAM(S): at 05:51

## 2020-09-18 RX ADMIN — Medication 1 MILLIGRAM(S): at 12:15

## 2020-09-18 RX ADMIN — Medication 100 MILLIGRAM(S): at 21:02

## 2020-09-18 RX ADMIN — Medication 100 MILLIGRAM(S): at 12:15

## 2020-09-18 RX ADMIN — Medication 32.4 MILLIGRAM(S): at 18:18

## 2020-09-18 RX ADMIN — ENOXAPARIN SODIUM 40 MILLIGRAM(S): 100 INJECTION SUBCUTANEOUS at 12:15

## 2020-09-18 RX ADMIN — Medication 1 TABLET(S): at 12:15

## 2020-09-18 RX ADMIN — Medication 32.4 MILLIGRAM(S): at 06:09

## 2020-09-18 RX ADMIN — ATORVASTATIN CALCIUM 20 MILLIGRAM(S): 80 TABLET, FILM COATED ORAL at 21:02

## 2020-09-18 NOTE — PROGRESS NOTE BEHAVIORAL HEALTH - NSBHCHARTREVIEWVS_PSY_A_CORE FT
T(C): 36.7 (09-15-20 @ 11:30), Max: 36.7 (09-14-20 @ 15:15)  HR: 76 (09-15-20 @ 12:30) (61 - 92)  BP: 140/92 (09-15-20 @ 12:30) (98/66 - 186/95)  RR: 18 (09-15-20 @ 12:30) (12 - 27)  SpO2: 99% (09-15-20 @ 12:30) (95% - 100%)
T(C): 36.7 (09-16-20 @ 12:00), Max: 38.2 (09-16-20 @ 05:00)  HR: 74 (09-16-20 @ 15:00) (64 - 108)  BP: 133/80 (09-16-20 @ 15:00) (90/67 - 157/107)  RR: 21 (09-16-20 @ 15:00) (11 - 27)  SpO2: 99% (09-16-20 @ 15:00) (88% - 100%)
T(C): 36.8 (09-17-20 @ 12:00), Max: 36.8 (09-16-20 @ 23:25)  HR: 115 (09-17-20 @ 16:00) (64 - 125)  BP: 150/95 (09-17-20 @ 16:00) (117/79 - 174/94)  RR: 22 (09-17-20 @ 16:00) (12 - 26)  SpO2: 96% (09-17-20 @ 16:00) (94% - 100%)
T(C): 36.9 (09-14-20 @ 12:30), Max: 37.4 (09-14-20 @ 07:51)  HR: 71 (09-14-20 @ 13:00) (62 - 94)  BP: 139/83 (09-14-20 @ 13:00) (95/67 - 157/95)  RR: 13 (09-14-20 @ 13:00) (13 - 22)  SpO2: 99% (09-14-20 @ 13:00) (95% - 100%)
T(C): 37.1 (09-18-20 @ 10:35), Max: 38.2 (09-17-20 @ 17:00)  HR: 94 (09-18-20 @ 10:35) (94 - 124)  BP: 135/91 (09-18-20 @ 10:35) (135/91 - 158/90)  RR: 20 (09-18-20 @ 10:35) (16 - 21)  SpO2: 94% (09-18-20 @ 10:35) (94% - 99%)

## 2020-09-18 NOTE — PROGRESS NOTE BEHAVIORAL HEALTH - NSBHCONSULTOBSREASON_PSY_A_CORE FT
fall risk, disorganized

## 2020-09-18 NOTE — PROGRESS NOTE BEHAVIORAL HEALTH - NSBHCHARTREVIEWLAB_PSY_A_CORE FT
09-15    138  |  105  |  6<L>  ----------------------------<  93  3.2<L>   |  23  |  0.76    Ca    8.9      15 Sep 2020 04:04  Phos  3.2     09-15  Mg     1.9     09-15    TPro  7.4  /  Alb  2.9<L>  /  TBili  0.5  /  DBili  x   /  AST  23  /  ALT  21  /  AlkPhos  46  09-14
09-14    141  |  105  |  6<L>  ----------------------------<  104<H>  3.1<L>   |  27  |  0.90    Ca    8.7      14 Sep 2020 05:19  Phos  2.4     09-14  Mg     2.0     09-14    TPro  7.4  /  Alb  2.9<L>  /  TBili  0.5  /  DBili  x   /  AST  23  /  ALT  21  /  AlkPhos  46  09-14
09-16    138  |  104  |  6<L>  ----------------------------<  72  3.7   |  24  |  0.88    Ca    9.4      16 Sep 2020 05:22  Phos  3.0     09-16  Mg     2.0     09-16    TPro  8.5<H>  /  Alb  3.0<L>  /  TBili  0.5  /  DBili  x   /  AST  18  /  ALT  18  /  AlkPhos  48  09-16
09-17    139  |  105  |  6<L>  ----------------------------<  88  3.3<L>   |  24  |  0.82    Ca    8.9      17 Sep 2020 03:18  Phos  3.3     09-17  Mg     2.0     09-17    TPro  8.5<H>  /  Alb  3.0<L>  /  TBili  0.5  /  DBili  x   /  AST  18  /  ALT  18  /  AlkPhos  48  09-16
09-18    141  |  107  |  11  ----------------------------<  78  3.3<L>   |  23  |  0.95    Ca    8.9      18 Sep 2020 07:48  Phos  3.1     09-18  Mg     2.2     09-18

## 2020-09-18 NOTE — PROGRESS NOTE BEHAVIORAL HEALTH - THOUGHT PROCESS
Impaired reasoning/Other
Other/Impaired reasoning
Other/Impaired reasoning

## 2020-09-18 NOTE — PROGRESS NOTE BEHAVIORAL HEALTH - NSBHFUPINTERVALHXFT_PSY_A_CORE
No significant interval events; same clinical presentation: Lying in bed, awake, semi-alert, weak eye contact with difficulty maintaining attention, looks confused and not able to answer in a linear manner to simple questions.
No significant interval events; same clinical presentation: Lying in bed, looks comfortable, no agitation or restlessness. Tolerating phenobarb taper down at this time
Patient continued to maintain his progress; out of ICU and on regular medical floors. + again more alert, makes eye contact, tried to flirt with Writer, improved attention, able to reply to basic questions; + regressing residual confusion present which is expected
Patient is getting better - more alert, makes eye contact, improved attention, able to reply to basic questions; + residual confusion present which is expected
Patient is still in CCU. Lying in bed, awake, semi-alert, weak eye contact with difficulty maintaining attention, looks confused and not able to answer in a linear manner to simple questions. He spends the time moving his legs up and down and kicking off the blanket.

## 2020-09-18 NOTE — PROGRESS NOTE BEHAVIORAL HEALTH - PRIMARY DX
Delirium due to multiple etiologies, acute, hyperactive

## 2020-09-18 NOTE — PROGRESS NOTE BEHAVIORAL HEALTH - SECONDARY DX1
Alcohol use disorder, severe, dependence

## 2020-09-18 NOTE — PROGRESS NOTE ADULT - SUBJECTIVE AND OBJECTIVE BOX
HPI:  Pt is a 54 y/o male w/ ETOH abuse w/frequent admissions,  GERD, HLD, alcoholic gastritis/esophagitis, PUD, hx of hypoxic respiratory failure requiring intubation due to aspiration PNA comes w/abdominal pain mostly in epigastric area and also reports emesis states coffee ground color.  Pt was last admitted 2 months ago for abdominal pains.  Reports pain mostly in epigastric area, worse w/eating for last 4 days w/decreased po intake and mostly just drinking as a result over this time. Usually drinks vodka almost a bottle on regular basis. no fever, chills, sob, cp, palpitaions, +n/+v/-d/-c no travesl or sick contacts. (10 Sep 2020 20:33)      SUBJECTIVE & OBJECTIVE: Pt seen and examined at bedside.     PHYSICAL EXAM:  T(C): 37.2 (09-18-20 @ 23:22), Max: 37.3 (09-18-20 @ 00:11)  HR: 84 (09-18-20 @ 23:22) (84 - 104)  BP: 121/69 (09-18-20 @ 23:22) (121/69 - 152/80)  RR: 18 (09-18-20 @ 23:22) (18 - 20)  SpO2: 94% (09-18-20 @ 23:22) (94% - 96%)  Wt(kg): --   I&O's Detail    17 Sep 2020 07:01  -  18 Sep 2020 07:00  --------------------------------------------------------  IN:    Dexmedetomidine: 11.2 mL    Lactated Ringers: 200 mL    Oral Fluid: 760 mL  Total IN: 971.2 mL    OUT:    Incontinent per Collection Bag (mL): 300 mL  Total OUT: 300 mL    Total NET: 671.2 mL      18 Sep 2020 07:01  -  18 Sep 2020 23:58  --------------------------------------------------------  IN:    Oral Fluid: 420 mL  Total IN: 420 mL    OUT:  Total OUT: 0 mL    Total NET: 420 mL        GENERAL: NAD, well-groomed, well-developed  HEAD:  Atraumatic, Normocephalic  EYES: EOMI, PERRLA, conjunctiva and sclera clear  ENMT: Moist mucous membranes  NECK: Supple, No JVD  NERVOUS SYSTEM:  Alert & Oriented X3, Motor Strength 5/5 B/L upper and lower extremities; DTRs 2+ intact and symmetric  CHEST/LUNG: Clear to auscultation bilaterally; No rales, rhonchi, wheezing, or rubs  HEART: Regular rate and rhythm; No murmurs, rubs, or gallops  ABDOMEN: Soft, Nontender, Nondistended; Bowel sounds present  EXTREMITIES:  2+ Peripheral Pulses, No clubbing, cyanosis, or edema    MEDICATIONS  (STANDING):  atorvastatin 20 milliGRAM(s) Oral at bedtime  benzonatate 100 milliGRAM(s) Oral three times a day  enoxaparin Injectable 40 milliGRAM(s) SubCutaneous daily  folic acid 1 milliGRAM(s) Oral daily  multivitamin 1 Tablet(s) Oral daily  pantoprazole  Injectable 40 milliGRAM(s) IV Push two times a day  senna 2 Tablet(s) Oral at bedtime  thiamine 100 milliGRAM(s) Oral daily    MEDICATIONS  (PRN):  acetaminophen   Tablet .. 650 milliGRAM(s) Oral every 6 hours PRN Temp greater or equal to 38C (100.4F), Mild Pain (1 - 3)  haloperidol    Injectable 5 milliGRAM(s) IV Push every 6 hours PRN Agitation      LABS:                        10.7   4.62  )-----------( 308      ( 18 Sep 2020 07:48 )             33.0     09-18    141  |  107  |  11  ----------------------------<  78  3.3<L>   |  23  |  0.95    Ca    8.9      18 Sep 2020 07:48  Phos  3.1     09-18  Mg     2.2     09-18          Magnesium, Serum: 2.2 mg/dL (09-18 @ 07:48)    CAPILLARY BLOOD GLUCOSE                RECENT CULTURES:      RADIOLOGY & ADDITIONAL TESTS:  Imaging Personally Reviewed:  [ ] YES  [ ] NO    Consultant(s) Notes Reviewed:  [ ] YES  [ ] NO    Care Discussed with Consultants/Other Providers [ ] YES  [ ] NO     HPI:  Pt is a 54 y/o male w/ ETOH abuse w/frequent admissions,  GERD, HLD, alcoholic gastritis/esophagitis, PUD, hx of hypoxic respiratory failure requiring intubation due to aspiration PNA comes w/abdominal pain mostly in epigastric area and also reports emesis states coffee ground color.  Pt was last admitted 2 months ago for abdominal pains.  Reports pain mostly in epigastric area, worse w/eating for last 4 days w/decreased po intake and mostly just drinking as a result over this time. Usually drinks vodka almost a bottle on regular basis. no fever, chills, sob, cp, palpitaions, +n/+v/-d/-c no travesl or sick contacts. (10 Sep 2020 20:33)      SUBJECTIVE & OBJECTIVE: Pt seen and examined at bedside.   no overnight events.     Denies fever, chills, N/V, dizziness, HA, cough, CP, palpitations, SOB, abdominal pain, dysuria, diarrhea, constipation.   PHYSICAL EXAM:  T(C): 37.2 (09-18-20 @ 23:22), Max: 37.3 (09-18-20 @ 00:11)  HR: 84 (09-18-20 @ 23:22) (84 - 104)  BP: 121/69 (09-18-20 @ 23:22) (121/69 - 152/80)  RR: 18 (09-18-20 @ 23:22) (18 - 20)  SpO2: 94% (09-18-20 @ 23:22) (94% - 96%) on RA   Wt(kg): --   I&O's Detail    17 Sep 2020 07:01  -  18 Sep 2020 07:00  --------------------------------------------------------  IN:    Dexmedetomidine: 11.2 mL    Lactated Ringers: 200 mL    Oral Fluid: 760 mL  Total IN: 971.2 mL    OUT:    Incontinent per Collection Bag (mL): 300 mL  Total OUT: 300 mL    Total NET: 671.2 mL      18 Sep 2020 07:01  -  18 Sep 2020 23:58  --------------------------------------------------------  IN:    Oral Fluid: 420 mL  Total IN: 420 mL    OUT:  Total OUT: 0 mL    Total NET: 420 mL        GENERAL: NAD, patient answers appropriately, calm and cooperative   HEAD:  Atraumatic, Normocephalic  EYES: EOMI, PERRLA, conjunctiva and sclera clear  ENMT: Moist mucous membranes  NECK: Supple, No JVD  NERVOUS SYSTEM:  Alert & Oriented X2-3, nonfocal   CHEST/LUNG: Clear to auscultation bilaterally; No rales, rhonchi, wheezing, or rubs  HEART: Regular rate and rhythm; No murmurs, rubs, or gallops  ABDOMEN: Soft, Nontender, Nondistended; Bowel sounds present  EXTREMITIES:  2+ Peripheral Pulses b/l, No clubbing, cyanosis, or edema b/l     MEDICATIONS  (STANDING):  atorvastatin 20 milliGRAM(s) Oral at bedtime  benzonatate 100 milliGRAM(s) Oral three times a day  enoxaparin Injectable 40 milliGRAM(s) SubCutaneous daily  folic acid 1 milliGRAM(s) Oral daily  multivitamin 1 Tablet(s) Oral daily  pantoprazole  Injectable 40 milliGRAM(s) IV Push two times a day  senna 2 Tablet(s) Oral at bedtime  thiamine 100 milliGRAM(s) Oral daily    MEDICATIONS  (PRN):  acetaminophen   Tablet .. 650 milliGRAM(s) Oral every 6 hours PRN Temp greater or equal to 38C (100.4F), Mild Pain (1 - 3)  haloperidol    Injectable 5 milliGRAM(s) IV Push every 6 hours PRN Agitation      LABS:                        10.7   4.62  )-----------( 308      ( 18 Sep 2020 07:48 )             33.0     09-18    141  |  107  |  11  ----------------------------<  78  3.3<L>   |  23  |  0.95    Ca    8.9      18 Sep 2020 07:48  Phos  3.1     09-18  Mg     2.2     09-18          Magnesium, Serum: 2.2 mg/dL (09-18 @ 07:48)    CAPILLARY BLOOD GLUCOSE                RECENT CULTURES:      RADIOLOGY & ADDITIONAL TESTS:    Imaging Personally Reviewed:  [ ] YES  [ ] NO    Consultant(s) Notes Reviewed:  [x ] YES  [ ] NO    Care Discussed with Consultants/Other Providers [x ] YES  [ ] NO  Care discussed in detail with patient.  All questions and concerns addressed

## 2020-09-18 NOTE — PROGRESS NOTE BEHAVIORAL HEALTH - OTHER
superficially cooperative improved improving deferred at this time 'ok" looks comfortable, smiling today getting more linear still some poverty of content which is expected does not seem to be responding ot internal stimuli limited

## 2020-09-18 NOTE — PROGRESS NOTE BEHAVIORAL HEALTH - NSBHCONSULTFOLLOWDETAILS_PSY_A_CORE FT
cannot leave AMA; no capacity

## 2020-09-18 NOTE — PROGRESS NOTE ADULT - ASSESSMENT
55 M with HLD, GERD, EtOH use disorder with frequent admissions for EtOH withdrawal c/b gastritis/esophagits presented with epigastric pain, coffee ground emesis, and lactic acidosis (10.9). Course was complicated by worsening agitation and delirium. Transferred to ICU for airway monitoring. Gradual improvement.    Assessment and Plan:       NEURO: D/C precedex gtt. Phenobarb taper.  CVS: HD stable  PULM: Saturating well on minimal oxygen  GI: No further episodes of coffee ground emesis, Hgb stable, continue PPI BID  RENAL: Creatinine stable, urine output acceptable  ENDO: FS acceptable  ID: No evidence of active infection  HEME: Hgb stable  PPX: Lovenox  Diet: potassium supplemented, thiamine, folate, multivitamin, diet when awake and alert to tolerate. 55 M with HLD, GERD, EtOH, frequently admitted to [facility] for ETOH w/d (at least once monthly for years),  gastritis/esophagits  admitted 9/10 with ETOH intoxication and presented with epigastric pain, coffee ground emesis, and lactic acidosis (10.9). Course was complicated by worsening agitation and delirium. Transferred to ICU for airway monitoring. Gradual improvement on precedex in ICU. Patient did well and transferred back to [facility] floor on phenobarb taper.     Assessment and Plan:   ETOH w/d, s/p ICU stay for DTs.  on phenobarb taper from ICU. CIWA now 0. cont CIWA protocol. cont aspiration, seizure and fall precautions. follow electrolytes. replete as necessary.  continue with thiamine/folic acid/MVI  acute metabolic encephalopathy due to above; MS at baseline   Upper GI bleed; presumes secondary to gastritis/esophagitis due to ETOH abuse, Last EGD was 5/ 2020- gastritis.  seen by GI. episodes resolved. continue with PPI PO. H/H stable.   3. Electrolyte abnormalities, due to ETOH w/d. HYPOPHOSPHATEMIA, HYPOKALEMIA, HYPOMAGNESEMIA --REPLETE AND MONITOR   4. lactic acidosis from alcohol/dehydration  - normalized with IVF   3.	chronic alcoholic hepatitis. hepatic steatosis.  education on ETOH cessation provided. education on high risk of developing liver cirrhosis if patient does not quit ETOH and complications of liver cirrhosis discussed at length.   4.	Chronic low back pain. cont tylenol prn.   5.	Preventative measures , lovenox SQ-dvt ppx    distended urinary baldder on 9/10/20 CT scan noted, however patient is urinating well. no e/o urinary obstruction. Cr stable.  also seen is Thickened ascending colon without pericolonic fat stranding, nonspecific finding. patient does not have abd pain/vomiting or nausea.   +normal BMs.

## 2020-09-18 NOTE — PROGRESS NOTE BEHAVIORAL HEALTH - ORIENTATION OTHER
does not answer MMSE questions, + responds to his name

## 2020-09-18 NOTE — PROGRESS NOTE BEHAVIORAL HEALTH - SUMMARY
acute delirium  - initially presented to ED with "intoxication" and not withdrawal. Severe withdrawal symptoms usually occur 2-3 days after last drink   - may have some further disinhibition from IVP phenobarb (some people are more sensitive to it)   - NO capacity to leave AMA/make his medical decisions
acute delirium that has started to slowly regress   - NO capacity to leave AMA/make his medical decisions
acute delirium that has started to slowly regress; should return to baseline MSE by Monday if he continues this trajectory of progress   - NO capacity to leave AMA/make his medical decisions

## 2020-09-18 NOTE — PROGRESS NOTE BEHAVIORAL HEALTH - NSBHFUPREASONCONS_PSY_A_CORE
agitation/delerium/med management/alcohol
alcohol/other...
med management/alcohol/delerium/agitation
other.../delerium
agitation/capacity/alcohol

## 2020-09-18 NOTE — PROGRESS NOTE BEHAVIORAL HEALTH - ABNORMAL MOVEMENTS
No abnormal movements/Other
No abnormal movements
No abnormal movements/Other
Other/No abnormal movements
Other/No abnormal movements

## 2020-09-19 LAB
ALBUMIN SERPL ELPH-MCNC: 3.2 G/DL — LOW (ref 3.3–5)
ALP SERPL-CCNC: 34 U/L — LOW (ref 40–120)
ALT FLD-CCNC: 14 U/L — SIGNIFICANT CHANGE UP (ref 12–78)
ANION GAP SERPL CALC-SCNC: 9 MMOL/L — SIGNIFICANT CHANGE UP (ref 5–17)
AST SERPL-CCNC: 22 U/L — SIGNIFICANT CHANGE UP (ref 15–37)
BILIRUB SERPL-MCNC: 0.2 MG/DL — SIGNIFICANT CHANGE UP (ref 0.2–1.2)
BUN SERPL-MCNC: 12 MG/DL — SIGNIFICANT CHANGE UP (ref 7–23)
CALCIUM SERPL-MCNC: 9.2 MG/DL — SIGNIFICANT CHANGE UP (ref 8.5–10.1)
CHLORIDE SERPL-SCNC: 106 MMOL/L — SIGNIFICANT CHANGE UP (ref 96–108)
CO2 SERPL-SCNC: 24 MMOL/L — SIGNIFICANT CHANGE UP (ref 22–31)
CREAT SERPL-MCNC: 0.94 MG/DL — SIGNIFICANT CHANGE UP (ref 0.5–1.3)
GLUCOSE SERPL-MCNC: 83 MG/DL — SIGNIFICANT CHANGE UP (ref 70–99)
HCT VFR BLD CALC: 34 % — LOW (ref 39–50)
HGB BLD-MCNC: 10.9 G/DL — LOW (ref 13–17)
MAGNESIUM SERPL-MCNC: 2.1 MG/DL — SIGNIFICANT CHANGE UP (ref 1.6–2.6)
MCHC RBC-ENTMCNC: 27.7 PG — SIGNIFICANT CHANGE UP (ref 27–34)
MCHC RBC-ENTMCNC: 32.1 GM/DL — SIGNIFICANT CHANGE UP (ref 32–36)
MCV RBC AUTO: 86.5 FL — SIGNIFICANT CHANGE UP (ref 80–100)
NRBC # BLD: 0 /100 WBCS — SIGNIFICANT CHANGE UP (ref 0–0)
PHOSPHATE SERPL-MCNC: 2.9 MG/DL — SIGNIFICANT CHANGE UP (ref 2.5–4.5)
PLATELET # BLD AUTO: 300 K/UL — SIGNIFICANT CHANGE UP (ref 150–400)
POTASSIUM SERPL-MCNC: 3.9 MMOL/L — SIGNIFICANT CHANGE UP (ref 3.5–5.3)
POTASSIUM SERPL-SCNC: 3.9 MMOL/L — SIGNIFICANT CHANGE UP (ref 3.5–5.3)
PROT SERPL-MCNC: 8.2 GM/DL — SIGNIFICANT CHANGE UP (ref 6–8.3)
RBC # BLD: 3.93 M/UL — LOW (ref 4.2–5.8)
RBC # FLD: 12.5 % — SIGNIFICANT CHANGE UP (ref 10.3–14.5)
SODIUM SERPL-SCNC: 139 MMOL/L — SIGNIFICANT CHANGE UP (ref 135–145)
WBC # BLD: 3.72 K/UL — LOW (ref 3.8–10.5)
WBC # FLD AUTO: 3.72 K/UL — LOW (ref 3.8–10.5)

## 2020-09-19 PROCEDURE — 99233 SBSQ HOSP IP/OBS HIGH 50: CPT

## 2020-09-19 RX ADMIN — Medication 650 MILLIGRAM(S): at 03:39

## 2020-09-19 RX ADMIN — Medication 100 MILLIGRAM(S): at 11:57

## 2020-09-19 RX ADMIN — Medication 100 MILLIGRAM(S): at 21:53

## 2020-09-19 RX ADMIN — Medication 100 MILLIGRAM(S): at 05:36

## 2020-09-19 RX ADMIN — Medication 1 TABLET(S): at 11:57

## 2020-09-19 RX ADMIN — Medication 100 MILLIGRAM(S): at 18:16

## 2020-09-19 RX ADMIN — PANTOPRAZOLE SODIUM 40 MILLIGRAM(S): 20 TABLET, DELAYED RELEASE ORAL at 18:18

## 2020-09-19 RX ADMIN — ATORVASTATIN CALCIUM 20 MILLIGRAM(S): 80 TABLET, FILM COATED ORAL at 21:53

## 2020-09-19 RX ADMIN — Medication 32.4 MILLIGRAM(S): at 05:36

## 2020-09-19 RX ADMIN — Medication 1 MILLIGRAM(S): at 11:57

## 2020-09-19 RX ADMIN — Medication 650 MILLIGRAM(S): at 04:30

## 2020-09-19 RX ADMIN — ENOXAPARIN SODIUM 40 MILLIGRAM(S): 100 INJECTION SUBCUTANEOUS at 11:57

## 2020-09-19 RX ADMIN — SENNA PLUS 2 TABLET(S): 8.6 TABLET ORAL at 21:53

## 2020-09-19 RX ADMIN — PANTOPRAZOLE SODIUM 40 MILLIGRAM(S): 20 TABLET, DELAYED RELEASE ORAL at 05:36

## 2020-09-19 NOTE — PROGRESS NOTE ADULT - ASSESSMENT
55 M with HLD, GERD, EtOH use disorder with frequent admissions for EtOH withdrawal c/b gastritis/esophagits presented with epigastric pain, coffee ground emesis, and lactic acidosis (10.9). Course was complicated by worsening agitation and delirium. Transferred to ICU for airway monitoring. Gradual improvement.    Assessment and Plan:       NEURO: D/C precedex gtt. Phenobarb taper.  CVS: HD stable  PULM: Saturating well on minimal oxygen  GI: No further episodes of coffee ground emesis, Hgb stable, continue PPI BID  RENAL: Creatinine stable, urine output acceptable  ENDO: FS acceptable  ID: No evidence of active infection  HEME: Hgb stable  PPX: Lovenox  Diet: potassium supplemented, thiamine, folate, multivitamin, diet when awake and alert to tolerate. 55 M with HLD, GERD, EtOH, frequently admitted to [facility] for ETOH w/d (at least once monthly for years),  gastritis/esophagits  admitted 9/10 with ETOH intoxication and presented with epigastric pain, coffee ground emesis, and lactic acidosis (10.9). Course was complicated by worsening agitation and delirium. Transferred to ICU for airway monitoring. Gradual improvement on precedex in ICU. Patient did well and transferred back to [facility] floor on phenobarb taper.     Assessment and Plan:   ETOH w/d, s/p ICU stay for DTs.  on phenobarb taper from ICU. CIWA now 0. cont CIWA protocol. cont aspiration, seizure and fall precautions. follow electrolytes. replete as necessary.  continue with thiamine/folic acid/MVI  acute metabolic encephalopathy due to above; MS at baseline   Upper GI bleed; presumes secondary to gastritis/esophagitis due to ETOH abuse, Last EGD was 5/ 2020- gastritis.  seen by GI. episodes resolved. continue with PPI PO. H/H stable.   3. Electrolyte abnormalities, due to ETOH w/d. HYPOPHOSPHATEMIA, HYPOKALEMIA, HYPOMAGNESEMIA --REPLETE AND MONITOR   4. lactic acidosis from alcohol/dehydration  - normalized with IVF   3.	chronic alcoholic hepatitis. hepatic steatosis.  education on ETOH cessation provided. education on high risk of developing liver cirrhosis if patient does not quit ETOH and complications of liver cirrhosis discussed at length.   4.	Chronic low back pain. cont tylenol prn.   5.	Preventative measures , lovenox SQ-dvt ppx    distended urinary baldder on 9/10/20 CT scan noted, however patient is urinating well. no e/o urinary obstruction. Cr stable.  also seen is Thickened ascending colon without pericolonic fat stranding, nonspecific finding. patient does not have abd pain/vomiting or nausea.   +normal BMs.

## 2020-09-19 NOTE — PROGRESS NOTE ADULT - SUBJECTIVE AND OBJECTIVE BOX
HPI:  Pt is a 56 y/o male w/ ETOH abuse w/frequent admissions,  GERD, HLD, alcoholic gastritis/esophagitis, PUD, hx of hypoxic respiratory failure requiring intubation due to aspiration PNA comes w/abdominal pain mostly in epigastric area and also reports emesis states coffee ground color.  Pt was last admitted 2 months ago for abdominal pains.  Reports pain mostly in epigastric area, worse w/eating for last 4 days w/decreased po intake and mostly just drinking as a result over this time. Usually drinks vodka almost a bottle on regular basis. no fever, chills, sob, cp, palpitaions, +n/+v/-d/-c no travesl or sick contacts. (10 Sep 2020 20:33)      SUBJECTIVE & OBJECTIVE: Pt seen and examined at bedside.   no overnight events.     Denies fever, chills, N/V, dizziness, HA, cough, CP, palpitations, SOB, abdominal pain, dysuria, diarrhea, constipation.     PHYSICAL EXAM:  Vital Signs Last 24 Hrs  T(C): 37.1 (19 Sep 2020 17:00), Max: 37.2 (18 Sep 2020 23:22)  T(F): 98.7 (19 Sep 2020 17:00), Max: 99 (18 Sep 2020 23:22)  HR: 91 (19 Sep 2020 17:00) (74 - 93)  BP: 138/86 (19 Sep 2020 17:00) (121/69 - 157/97)  BP(mean): --  RR: 18 (19 Sep 2020 17:00) (16 - 18)  SpO2: 97% (19 Sep 2020 17:00) (94% - 97%) on RA       GENERAL: NAD, well-groomed, well-developed  HEAD:  Atraumatic, Normocephalic  EYES: EOMI, PERRLA, conjunctiva and sclera clear  ENMT: Moist mucous membranes  NECK: Supple, No JVD  NERVOUS SYSTEM:  Alert & Oriented X1-2, nonfocal   CHEST/LUNG: Clear to auscultation bilaterally; No rales, rhonchi, wheezing, or rubs  HEART: Regular rate and rhythm; No murmurs, rubs, or gallops  ABDOMEN: Soft, Nontender, Nondistended; Bowel sounds present  EXTREMITIES:  2+ Peripheral Pulses b/l, No clubbing, cyanosis, or edema b/l     MEDICATIONS  (STANDING):  atorvastatin 20 milliGRAM(s) Oral at bedtime  benzonatate 100 milliGRAM(s) Oral three times a day  enoxaparin Injectable 40 milliGRAM(s) SubCutaneous daily  folic acid 1 milliGRAM(s) Oral daily  multivitamin 1 Tablet(s) Oral daily  pantoprazole  Injectable 40 milliGRAM(s) IV Push two times a day  senna 2 Tablet(s) Oral at bedtime  thiamine 100 milliGRAM(s) Oral daily    MEDICATIONS  (PRN):  acetaminophen   Tablet .. 650 milliGRAM(s) Oral every 6 hours PRN Temp greater or equal to 38C (100.4F), Mild Pain (1 - 3)  haloperidol    Injectable 5 milliGRAM(s) IV Push every 6 hours PRN Agitation      LABS:                                   10.9   3.72  )-----------( 300      ( 19 Sep 2020 08:25 )             34.0   09-19    139  |  106  |  12  ----------------------------<  83  3.9   |  24  |  0.94    Ca    9.2      19 Sep 2020 08:25  Phos  2.9     09-19  Mg     2.1     09-19    TPro  8.2  /  Alb  3.2<L>  /  TBili  0.2  /  DBili  x   /  AST  22  /  ALT  14  /  AlkPhos  34<L>  09-19          RADIOLOGY & ADDITIONAL TESTS:  Imaging Personally Reviewed:  [ ] YES  [ ] NO    Consultant(s) Notes Reviewed:  [x ] YES  [ ] NO    Care Discussed with Consultants/Other Providers [x ] YES  [ ] NO    Care discussed in detail with patient.  All questions and concerns addressed     HPI:  Pt is a 54 y/o male w/ ETOH abuse w/frequent admissions,  GERD, HLD, alcoholic gastritis/esophagitis, PUD, hx of hypoxic respiratory failure requiring intubation due to aspiration PNA comes w/abdominal pain mostly in epigastric area and also reports emesis states coffee ground color.  Pt was last admitted 2 months ago for abdominal pains.  Reports pain mostly in epigastric area, worse w/eating for last 4 days w/decreased po intake and mostly just drinking as a result over this time. Usually drinks vodka almost a bottle on regular basis. no fever, chills, sob, cp, palpitaions, +n/+v/-d/-c no travesl or sick contacts. (10 Sep 2020 20:33)      SUBJECTIVE & OBJECTIVE: Pt seen and examined at bedside.   no overnight events.     Denies fever, chills, N/V, dizziness, HA, cough, CP, palpitations, SOB, abdominal pain, dysuria, diarrhea, constipation.     PHYSICAL EXAM:  Vital Signs Last 24 Hrs  T(C): 37.1 (19 Sep 2020 17:00), Max: 37.2 (18 Sep 2020 23:22)  T(F): 98.7 (19 Sep 2020 17:00), Max: 99 (18 Sep 2020 23:22)  HR: 91 (19 Sep 2020 17:00) (74 - 93)  BP: 138/86 (19 Sep 2020 17:00) (121/69 - 157/97)  BP(mean): --  RR: 18 (19 Sep 2020 17:00) (16 - 18)  SpO2: 97% (19 Sep 2020 17:00) (94% - 97%) on RA     GENERAL: NAD, patient answers appropriately, calm and cooperative   HEAD:  Atraumatic, Normocephalic  EYES: EOMI, PERRLA, conjunctiva and sclera clear  ENMT: Moist mucous membranes  NECK: Supple, No JVD  NERVOUS SYSTEM:  Alert & Oriented X2-3, nonfocal   CHEST/LUNG: Clear to auscultation bilaterally; No rales, rhonchi, wheezing, or rubs  HEART: Regular rate and rhythm; No murmurs, rubs, or gallops  ABDOMEN: Soft, Nontender, Nondistended; Bowel sounds present  EXTREMITIES:  2+ Peripheral Pulses b/l, No clubbing, cyanosis, or edema b/l       MEDICATIONS  (STANDING):  atorvastatin 20 milliGRAM(s) Oral at bedtime  benzonatate 100 milliGRAM(s) Oral three times a day  enoxaparin Injectable 40 milliGRAM(s) SubCutaneous daily  folic acid 1 milliGRAM(s) Oral daily  multivitamin 1 Tablet(s) Oral daily  pantoprazole  Injectable 40 milliGRAM(s) IV Push two times a day  senna 2 Tablet(s) Oral at bedtime  thiamine 100 milliGRAM(s) Oral daily    MEDICATIONS  (PRN):  acetaminophen   Tablet .. 650 milliGRAM(s) Oral every 6 hours PRN Temp greater or equal to 38C (100.4F), Mild Pain (1 - 3)  haloperidol    Injectable 5 milliGRAM(s) IV Push every 6 hours PRN Agitation      LABS:                                   10.9   3.72  )-----------( 300      ( 19 Sep 2020 08:25 )             34.0   09-19    139  |  106  |  12  ----------------------------<  83  3.9   |  24  |  0.94    Ca    9.2      19 Sep 2020 08:25  Phos  2.9     09-19  Mg     2.1     09-19    TPro  8.2  /  Alb  3.2<L>  /  TBili  0.2  /  DBili  x   /  AST  22  /  ALT  14  /  AlkPhos  34<L>  09-19          RADIOLOGY & ADDITIONAL TESTS:  Imaging Personally Reviewed:  [ ] YES  [ ] NO    Consultant(s) Notes Reviewed:  [x ] YES  [ ] NO    Care Discussed with Consultants/Other Providers [x ] YES  [ ] NO    Care discussed in detail with patient.  All questions and concerns addressed

## 2020-09-19 NOTE — CHART NOTE - NSCHARTNOTEFT_GEN_A_CORE
Pt admitted c gastric pain c coffee ground emesis; admission complicated by delirium & agitated state due to ETOH withdrawal. Pt c frequent admissions for ETOH withdrawal; PMHx also includes HLD, GERD, PNA, PUD, respiratory failure requiring intubation due to aspiration.  Pt remains confused but status is gradually improving    Factors impacting intake: [ X] none [ ] nausea  [ ] vomiting [ ] diarrhea [ ] constipation  [ ]chewing problems [ ] swallowing issues  [ ] other:     Diet Prescription: Diet, Regular (09-13-20 @ 10:13)    Intake:  % most meals; pt eating well    Current Weight:   74.2 kg (9/19); previous wt 75.2 kg (9/11)  % Weight Change: 1.3% wt loss x 8 days    Physical Appearance:  1+ generalized edema noted    Pertinent Medications: MEDICATIONS  (STANDING):  atorvastatin 20 milliGRAM(s) Oral at bedtime  benzonatate 100 milliGRAM(s) Oral three times a day  enoxaparin Injectable 40 milliGRAM(s) SubCutaneous daily  folic acid 1 milliGRAM(s) Oral daily  multivitamin 1 Tablet(s) Oral daily  pantoprazole  Injectable 40 milliGRAM(s) IV Push two times a day  senna 2 Tablet(s) Oral at bedtime  thiamine 100 milliGRAM(s) Oral daily    MEDICATIONS  (PRN):  acetaminophen   Tablet .. 650 milliGRAM(s) Oral every 6 hours PRN Temp greater or equal to 38C (100.4F), Mild Pain (1 - 3)  haloperidol    Injectable 5 milliGRAM(s) IV Push every 6 hours PRN Agitation    Pertinent Labs: 09-19 Na139 mmol/L Glu 83 mg/dL K+ 3.9 mmol/L Cr  0.94 mg/dL BUN 12 mg/dL 09-19 Phos 2.9 mg/dL 09-19 Alb 3.2 g/dL<L>    Skin:   WDL    Estimated Needs:   [x ] no change since previous assessment  (9/13)  [ ] recalculated:     Previous Nutrition Diagnosis:   [X ] Inadequate Energy Intake   Etiology:  Decreased ability to consume sufficient energy  Signs & Symptoms:  Pt consuming <50% PTA due to abdominal pain, emesis, ETOH abuse    GOAL:  Pt to consume >75% nutrition needs via meals/supplements - met at this time    Nutrition Diagnosis is [ ] ongoing  [X ] resolved [ ] not applicable     New Nutrition Diagnosis: NONE    Interventions:   continue current diet rx as noted  Recommend  [ ] Change Diet To:  [ ] Nutrition Supplement  [ ] Nutrition Support  [ ] Other:     Monitoring and Evaluation:   [X ] PO intake [ x ] Tolerance to diet prescription [ x ] weights [ x ] labs[ x ] follow up per protocol  [ ] other:

## 2020-09-19 NOTE — PROGRESS NOTE ADULT - PROVIDER SPECIALTY LIST ADULT
Critical Care
Gastroenterology
Hospitalist

## 2020-09-20 ENCOUNTER — TRANSCRIPTION ENCOUNTER (OUTPATIENT)
Age: 55
End: 2020-09-20

## 2020-09-20 VITALS — HEART RATE: 84 BPM | SYSTOLIC BLOOD PRESSURE: 148 MMHG | DIASTOLIC BLOOD PRESSURE: 86 MMHG

## 2020-09-20 LAB
ALBUMIN SERPL ELPH-MCNC: 3 G/DL — LOW (ref 3.3–5)
ALP SERPL-CCNC: 35 U/L — LOW (ref 40–120)
ALT FLD-CCNC: 19 U/L — SIGNIFICANT CHANGE UP (ref 12–78)
ANION GAP SERPL CALC-SCNC: 9 MMOL/L — SIGNIFICANT CHANGE UP (ref 5–17)
AST SERPL-CCNC: 19 U/L — SIGNIFICANT CHANGE UP (ref 15–37)
BILIRUB SERPL-MCNC: 0.1 MG/DL — LOW (ref 0.2–1.2)
BUN SERPL-MCNC: 13 MG/DL — SIGNIFICANT CHANGE UP (ref 7–23)
CALCIUM SERPL-MCNC: 8.9 MG/DL — SIGNIFICANT CHANGE UP (ref 8.5–10.1)
CHLORIDE SERPL-SCNC: 104 MMOL/L — SIGNIFICANT CHANGE UP (ref 96–108)
CO2 SERPL-SCNC: 24 MMOL/L — SIGNIFICANT CHANGE UP (ref 22–31)
CREAT SERPL-MCNC: 0.81 MG/DL — SIGNIFICANT CHANGE UP (ref 0.5–1.3)
GLUCOSE SERPL-MCNC: 81 MG/DL — SIGNIFICANT CHANGE UP (ref 70–99)
HCT VFR BLD CALC: 33.7 % — LOW (ref 39–50)
HGB BLD-MCNC: 10.8 G/DL — LOW (ref 13–17)
MAGNESIUM SERPL-MCNC: 2.1 MG/DL — SIGNIFICANT CHANGE UP (ref 1.6–2.6)
MCHC RBC-ENTMCNC: 27.7 PG — SIGNIFICANT CHANGE UP (ref 27–34)
MCHC RBC-ENTMCNC: 32 GM/DL — SIGNIFICANT CHANGE UP (ref 32–36)
MCV RBC AUTO: 86.4 FL — SIGNIFICANT CHANGE UP (ref 80–100)
NRBC # BLD: 0 /100 WBCS — SIGNIFICANT CHANGE UP (ref 0–0)
PHOSPHATE SERPL-MCNC: 3.1 MG/DL — SIGNIFICANT CHANGE UP (ref 2.5–4.5)
PLATELET # BLD AUTO: 377 K/UL — SIGNIFICANT CHANGE UP (ref 150–400)
POTASSIUM SERPL-MCNC: 3.4 MMOL/L — LOW (ref 3.5–5.3)
POTASSIUM SERPL-SCNC: 3.4 MMOL/L — LOW (ref 3.5–5.3)
PROT SERPL-MCNC: 7.4 GM/DL — SIGNIFICANT CHANGE UP (ref 6–8.3)
RBC # BLD: 3.9 M/UL — LOW (ref 4.2–5.8)
RBC # FLD: 12.6 % — SIGNIFICANT CHANGE UP (ref 10.3–14.5)
SODIUM SERPL-SCNC: 137 MMOL/L — SIGNIFICANT CHANGE UP (ref 135–145)
WBC # BLD: 3.46 K/UL — LOW (ref 3.8–10.5)
WBC # FLD AUTO: 3.46 K/UL — LOW (ref 3.8–10.5)

## 2020-09-20 PROCEDURE — 99239 HOSP IP/OBS DSCHRG MGMT >30: CPT

## 2020-09-20 RX ORDER — ATORVASTATIN CALCIUM 80 MG/1
1 TABLET, FILM COATED ORAL
Qty: 30 | Refills: 0
Start: 2020-09-20 | End: 2020-10-19

## 2020-09-20 RX ORDER — THIAMINE MONONITRATE (VIT B1) 100 MG
1 TABLET ORAL
Qty: 30 | Refills: 0
Start: 2020-09-20 | End: 2020-10-19

## 2020-09-20 RX ORDER — PANTOPRAZOLE SODIUM 20 MG/1
1 TABLET, DELAYED RELEASE ORAL
Qty: 30 | Refills: 0
Start: 2020-09-20 | End: 2020-10-19

## 2020-09-20 RX ORDER — POTASSIUM CHLORIDE 20 MEQ
40 PACKET (EA) ORAL ONCE
Refills: 0 | Status: DISCONTINUED | OUTPATIENT
Start: 2020-09-20 | End: 2020-09-20

## 2020-09-20 RX ORDER — FAMOTIDINE 10 MG/ML
1 INJECTION INTRAVENOUS
Qty: 0 | Refills: 0 | DISCHARGE

## 2020-09-20 RX ORDER — FOLIC ACID 0.8 MG
1 TABLET ORAL
Qty: 30 | Refills: 0
Start: 2020-09-20 | End: 2020-10-19

## 2020-09-20 RX ORDER — SENNA PLUS 8.6 MG/1
2 TABLET ORAL
Qty: 60 | Refills: 0
Start: 2020-09-20 | End: 2020-10-19

## 2020-09-20 RX ADMIN — Medication 100 MILLIGRAM(S): at 05:59

## 2020-09-20 RX ADMIN — PANTOPRAZOLE SODIUM 40 MILLIGRAM(S): 20 TABLET, DELAYED RELEASE ORAL at 05:59

## 2020-09-20 NOTE — DISCHARGE NOTE NURSING/CASE MANAGEMENT/SOCIAL WORK - PATIENT PORTAL LINK FT
You can access the FollowMyHealth Patient Portal offered by [facility] by registering at the following website: [email or web address]. By joining [facility]’s FollowMyHealth portal, you will also be able to view your health information using other applications (apps) compatible with our system.

## 2020-09-20 NOTE — DISCHARGE NOTE PROVIDER - HOSPITAL COURSE
55 years old male by ems c/o epigastric and mid abd pain vomiting coffee ground and c/o feeling hot all over his body. Pt sts he drinks alcohol three to four times a week and last drink was 4 days ago. Pt denies headache, dizziness, blurred visions, light sensitivities, focal/distal weakness or numbness, cough, sob, chest pain, dysuria, or irregular bowel movements. Patient has been admitted for the same reason multiple times over he past few years. Last EGD was 5/ 2020- gastritis.     Acute alcohol intoxication in a patient with hx of chronic alcohol dependence and ongoing alcohol abuse. Multiple hospitalizations. at high risk for DTs and/or severe withdrawal. Given phenobarbitone x 1 and  now on scheduled IV LORazepam with holding parameters. cont iv ativan prn. cont CIWA protocol. cont aspiration, seizure and fall precautions. follow labs. cont IVF. telemonitoring. Psych eval  acute metabolic encephalopathy due to above; monitor on mental status , now with baseline MS , requests to be d/c home , lives with wife and son   s/p upper GIB , no further episodes   hypomagnesemia; repleted   hypophosphetemia; as above  lactic acidosis from alcohol; now trending down, s/p IVF   chronic alcoholic hepatitis. advised quitting alcohol.   Chronic low back pain. cont tylenol prn.     Discharge time : 40 min     RETURN PARAMETERS DISCUSSED WITH PATIENT, PATIENT EXPRESSED UNDERSTANDING AND IS AGREEABLE. DISCUSSED WITH PATIENT ON REFRAINING FROM DRIVING UNTIL FOLLOW-UP/ CLEARED BY PMD. PATIENT EXPRESSED UNDERSTANDING.    55 M with HLD, GERD, EtOH, frequently admitted to [facility] for ETOH w/d (at least once monthly for years),  gastritis/esophagits  admitted 9/10 with ETOH intoxication and presented with epigastric pain, coffee ground emesis, and lactic acidosis (10.9). Course was complicated by worsening agitation and delirium. Transferred to ICU for airway monitoring. Gradual improvement on precedex in ICU. Patient did well and transferred back to [facility] floor on phenobarb taper.     GENERAL: NAD, patient answers appropriately, calm and cooperative   HEAD:  Atraumatic, Normocephalic  EYES: EOMI, PERRLA, conjunctiva and sclera clear  ENMT: Moist mucous membranes  NECK: Supple, No JVD  NERVOUS SYSTEM:  Alert & Oriented X3, nonfocal   CHEST/LUNG: Clear to auscultation bilaterally; No rales, rhonchi, wheezing, or rubs  HEART: Regular rate and rhythm; No murmurs, rubs, or gallops  ABDOMEN: Soft, Nontender, Nondistended; Bowel sounds present  EXTREMITIES:  2+ Peripheral Pulses b/l, No clubbing, cyanosis, or edema b/l       Assessment and Plan:   ETOH w/d, s/p ICU stay for DTs.  on phenobarb taper from ICU. CIWA now 0. cont CIWA protocol. cont aspiration, seizure and fall precautions. follow electrolytes. replete as necessary.  continue with thiamine/folic acid/MVI  acute metabolic encephalopathy due to above; MS at baseline   Upper GI bleed; presumes secondary to gastritis/esophagitis due to ETOH abuse, Last EGD was 5/ 2020- gastritis.  seen by GI. episodes resolved. continue with PPI PO. H/H stable.   3. Electrolyte abnormalities, due to ETOH w/d. HYPOPHOSPHATEMIA, HYPOKALEMIA, HYPOMAGNESEMIA --REPLETE AND MONITOR   4. lactic acidosis from alcohol/dehydration  - normalized with IVF   chronic alcoholic hepatitis. hepatic steatosis.  education on ETOH cessation provided. education on high risk of developing liver cirrhosis if patient does not quit ETOH and complications of liver cirrhosis discussed at length.   Chronic low back pain. cont tylenol prn.     distended urinary baldder on 9/10/20 CT scan noted, however patient is urinating well. no e/o urinary obstruction. Cr stable.  also seen is Thickened ascending colon without pericolonic fat stranding, nonspecific finding. patient does not have abd pain/vomiting or nausea.   +normal BMs.     patient ambulating without difficulty. MS at baseline. He lives with wife and son.    Discharge time : 40 min     RETURN PARAMETERS DISCUSSED WITH PATIENT, PATIENT EXPRESSED UNDERSTANDING AND IS AGREEABLE. DISCUSSED WITH PATIENT ON REFRAINING FROM DRIVING UNTIL FOLLOW-UP/ CLEARED BY PMD. PATIENT EXPRESSED UNDERSTANDING.

## 2020-09-20 NOTE — DISCHARGE NOTE PROVIDER - CARE PROVIDER_API CALL
Miko Pelyao  INTERNAL MEDICINE  [address], [address]  [place] [address]  Phone: [phone]  Fax: [phone]  Follow Up Time:     [place] [address]  Phone: [phone]  Fax: [phone]  Follow Up Time:

## 2020-09-20 NOTE — DISCHARGE NOTE PROVIDER - NSDCMRMEDTOKEN_GEN_ALL_CORE_FT
atorvastatin 20 mg oral tablet: 1 tab(s) orally once a day (at bedtime)  folic acid 1 mg oral tablet: 1 tab(s) orally once a day  Multiple Vitamins oral tablet: 1 tab(s) orally once a day  pantoprazole 20 mg oral delayed release tablet: 1 tab(s) orally once a day  senna oral tablet: 2 tab(s) orally once a day (at bedtime)  thiamine 100 mg oral tablet: 1 tab(s) orally once a day

## 2020-09-20 NOTE — DISCHARGE NOTE PROVIDER - NSDCCPCAREPLAN_GEN_ALL_CORE_FT
PRINCIPAL DISCHARGE DIAGNOSIS  Diagnosis: GI bleed  Assessment and Plan of Treatment:       SECONDARY DISCHARGE DIAGNOSES  Diagnosis: Hypokalemia  Assessment and Plan of Treatment:     Diagnosis: Severe alcohol withdrawal without perceptual disturbances without complication  Assessment and Plan of Treatment:     Diagnosis: Chronic gastritis, presence of bleeding unspecified, unspecified gastritis type  Assessment and Plan of Treatment: Chronic gastritis, presence of bleeding unspecified, unspecified gastritis type    Diagnosis: Alcohol intoxication  Assessment and Plan of Treatment:

## 2020-09-30 DIAGNOSIS — F10.220 ALCOHOL DEPENDENCE WITH INTOXICATION, UNCOMPLICATED: ICD-10-CM

## 2020-09-30 DIAGNOSIS — E78.5 HYPERLIPIDEMIA, UNSPECIFIED: ICD-10-CM

## 2020-09-30 DIAGNOSIS — G92 TOXIC ENCEPHALOPATHY: ICD-10-CM

## 2020-09-30 DIAGNOSIS — Z87.01 PERSONAL HISTORY OF PNEUMONIA (RECURRENT): ICD-10-CM

## 2020-09-30 DIAGNOSIS — E87.6 HYPOKALEMIA: ICD-10-CM

## 2020-09-30 DIAGNOSIS — K76.0 FATTY (CHANGE OF) LIVER, NOT ELSEWHERE CLASSIFIED: ICD-10-CM

## 2020-09-30 DIAGNOSIS — E83.42 HYPOMAGNESEMIA: ICD-10-CM

## 2020-09-30 DIAGNOSIS — R50.9 FEVER, UNSPECIFIED: ICD-10-CM

## 2020-09-30 DIAGNOSIS — K21.0 GASTRO-ESOPHAGEAL REFLUX DISEASE WITH ESOPHAGITIS: ICD-10-CM

## 2020-09-30 DIAGNOSIS — K70.10 ALCOHOLIC HEPATITIS WITHOUT ASCITES: ICD-10-CM

## 2020-09-30 DIAGNOSIS — Z87.11 PERSONAL HISTORY OF PEPTIC ULCER DISEASE: ICD-10-CM

## 2020-09-30 DIAGNOSIS — K29.21 ALCOHOLIC GASTRITIS WITH BLEEDING: ICD-10-CM

## 2020-09-30 DIAGNOSIS — E86.0 DEHYDRATION: ICD-10-CM

## 2020-09-30 DIAGNOSIS — E83.39 OTHER DISORDERS OF PHOSPHORUS METABOLISM: ICD-10-CM

## 2020-09-30 DIAGNOSIS — F10.231 ALCOHOL DEPENDENCE WITH WITHDRAWAL DELIRIUM: ICD-10-CM
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
## 2020-09-30 DIAGNOSIS — N17.9 ACUTE KIDNEY FAILURE, UNSPECIFIED: ICD-10-CM

## 2020-09-30 DIAGNOSIS — E87.2 ACIDOSIS: ICD-10-CM

## 2020-09-30 NOTE — PROCEDURE NOTE - NSNEEDLEGAUGE_GEN_A_CORE
From: Padmini Pollard  To: Madhia Rodriguez  Sent: 9/30/2020 8:00 AM CDT  Subject: Non-Urgent Medical Question    This message is being sent by Sameera Pollard on behalf of Padmini Pollard    Good morning-  Hope your Hump Day is off to a good start.  We did have an hour long conversation with the [facility] and they seem to be mulling it over - we should hear today if they decide to abide by the 14 day quarantine from my test date or if they are saying the 14 days starts from when I'm clear which is today. Anyway, our school is following their ruling and I plan to fight it - not sure how yet, but awaiting their decision in hopes I won't have to.  With that said, would you mind or have time today to write something up just in case?    Facts-Aurelio positive test 9/17, Terrance/I positive test 9/21. Aurelio had 1 day mild cold symptoms. 4 kids and I all asymptomatic (only say that to bring home point on CDC's 'close contact' criteria that we are not coughing or sneezing over each other). The kids and I have followed the CDC guidelines of 'close contact' at home since Friday 9/18. Last day of school/work for kids/I Friday 9/18. We are requesting return to school 10/5 which is 14 days after most recent positive test (following CDC scenario #2 https://www.cdc.gov/coronavirus/2019-ncov/if-you-are-sick/quarantine.html).    Tried to give you the facts in a nutshell, let me know if you need more details.  Thanks so much Dr Cleary - have a great day!  sameera  
20

## 2020-10-05 ENCOUNTER — INPATIENT (INPATIENT)
Facility: HOSPITAL | Age: 55
LOS: 7 days | Discharge: HOME | End: 2020-10-13
Attending: INTERNAL MEDICINE | Admitting: INTERNAL MEDICINE
Payer: MEDICAID

## 2020-10-05 VITALS
SYSTOLIC BLOOD PRESSURE: 146 MMHG | OXYGEN SATURATION: 95 % | RESPIRATION RATE: 19 BRPM | HEIGHT: 67 IN | DIASTOLIC BLOOD PRESSURE: 102 MMHG | HEART RATE: 154 BPM | TEMPERATURE: 99 F | WEIGHT: 162.04 LBS

## 2020-10-05 DIAGNOSIS — Z29.9 ENCOUNTER FOR PROPHYLACTIC MEASURES, UNSPECIFIED: ICD-10-CM

## 2020-10-05 DIAGNOSIS — K29.20 ALCOHOLIC GASTRITIS WITHOUT BLEEDING: ICD-10-CM

## 2020-10-05 DIAGNOSIS — E78.5 HYPERLIPIDEMIA, UNSPECIFIED: ICD-10-CM

## 2020-10-05 DIAGNOSIS — E87.2 ACIDOSIS: ICD-10-CM

## 2020-10-05 DIAGNOSIS — F10.239 ALCOHOL DEPENDENCE WITH WITHDRAWAL, UNSPECIFIED: ICD-10-CM

## 2020-10-05 LAB
ALBUMIN SERPL ELPH-MCNC: 4.4 G/DL — SIGNIFICANT CHANGE UP (ref 3.3–5)
ALP SERPL-CCNC: 58 U/L — SIGNIFICANT CHANGE UP (ref 40–120)
ALT FLD-CCNC: 28 U/L — SIGNIFICANT CHANGE UP (ref 12–78)
ANION GAP SERPL CALC-SCNC: 28 MMOL/L — HIGH (ref 5–17)
APTT BLD: 28.7 SEC — SIGNIFICANT CHANGE UP (ref 27.5–35.5)
AST SERPL-CCNC: 30 U/L — SIGNIFICANT CHANGE UP (ref 15–37)
BASOPHILS # BLD AUTO: 0.04 K/UL — SIGNIFICANT CHANGE UP (ref 0–0.2)
BASOPHILS NFR BLD AUTO: 0.5 % — SIGNIFICANT CHANGE UP (ref 0–2)
BILIRUB SERPL-MCNC: 0.3 MG/DL — SIGNIFICANT CHANGE UP (ref 0.2–1.2)
BUN SERPL-MCNC: 21 MG/DL — SIGNIFICANT CHANGE UP (ref 7–23)
CALCIUM SERPL-MCNC: 9.5 MG/DL — SIGNIFICANT CHANGE UP (ref 8.5–10.1)
CHLORIDE SERPL-SCNC: 97 MMOL/L — SIGNIFICANT CHANGE UP (ref 96–108)
CO2 SERPL-SCNC: 14 MMOL/L — LOW (ref 22–31)
CREAT SERPL-MCNC: 1.99 MG/DL — HIGH (ref 0.5–1.3)
EOSINOPHIL # BLD AUTO: 0 K/UL — SIGNIFICANT CHANGE UP (ref 0–0.5)
EOSINOPHIL NFR BLD AUTO: 0 % — SIGNIFICANT CHANGE UP (ref 0–6)
ETHANOL SERPL-MCNC: 230 MG/DL — HIGH (ref 0–10)
GLUCOSE SERPL-MCNC: 146 MG/DL — HIGH (ref 70–99)
HCT VFR BLD CALC: 43.7 % — SIGNIFICANT CHANGE UP (ref 39–50)
HGB BLD-MCNC: 14.3 G/DL — SIGNIFICANT CHANGE UP (ref 13–17)
IMM GRANULOCYTES NFR BLD AUTO: 0.5 % — SIGNIFICANT CHANGE UP (ref 0–1.5)
INR BLD: 1.01 RATIO — SIGNIFICANT CHANGE UP (ref 0.88–1.16)
LACTATE SERPL-SCNC: 14.4 MMOL/L — CRITICAL HIGH (ref 0.7–2)
LACTATE SERPL-SCNC: 9 MMOL/L — CRITICAL HIGH (ref 0.7–2)
LIDOCAIN IGE QN: 127 U/L — SIGNIFICANT CHANGE UP (ref 73–393)
LYMPHOCYTES # BLD AUTO: 1.12 K/UL — SIGNIFICANT CHANGE UP (ref 1–3.3)
LYMPHOCYTES # BLD AUTO: 14.3 % — SIGNIFICANT CHANGE UP (ref 13–44)
MAGNESIUM SERPL-MCNC: 2.5 MG/DL — SIGNIFICANT CHANGE UP (ref 1.6–2.6)
MCHC RBC-ENTMCNC: 27.5 PG — SIGNIFICANT CHANGE UP (ref 27–34)
MCHC RBC-ENTMCNC: 32.7 GM/DL — SIGNIFICANT CHANGE UP (ref 32–36)
MCV RBC AUTO: 84 FL — SIGNIFICANT CHANGE UP (ref 80–100)
MONOCYTES # BLD AUTO: 0.29 K/UL — SIGNIFICANT CHANGE UP (ref 0–0.9)
MONOCYTES NFR BLD AUTO: 3.7 % — SIGNIFICANT CHANGE UP (ref 2–14)
NEUTROPHILS # BLD AUTO: 6.33 K/UL — SIGNIFICANT CHANGE UP (ref 1.8–7.4)
NEUTROPHILS NFR BLD AUTO: 81 % — HIGH (ref 43–77)
NRBC # BLD: 0 /100 WBCS — SIGNIFICANT CHANGE UP (ref 0–0)
PHOSPHATE SERPL-MCNC: 4.6 MG/DL — HIGH (ref 2.5–4.5)
PLATELET # BLD AUTO: 411 K/UL — HIGH (ref 150–400)
POTASSIUM SERPL-MCNC: 4 MMOL/L — SIGNIFICANT CHANGE UP (ref 3.5–5.3)
POTASSIUM SERPL-SCNC: 4 MMOL/L — SIGNIFICANT CHANGE UP (ref 3.5–5.3)
PROT SERPL-MCNC: 10.3 GM/DL — HIGH (ref 6–8.3)
PROTHROM AB SERPL-ACNC: 11.7 SEC — SIGNIFICANT CHANGE UP (ref 10.6–13.6)
RBC # BLD: 5.2 M/UL — SIGNIFICANT CHANGE UP (ref 4.2–5.8)
RBC # FLD: 14.5 % — SIGNIFICANT CHANGE UP (ref 10.3–14.5)
SARS-COV-2 RNA SPEC QL NAA+PROBE: SIGNIFICANT CHANGE UP
SODIUM SERPL-SCNC: 139 MMOL/L — SIGNIFICANT CHANGE UP (ref 135–145)
WBC # BLD: 7.82 K/UL — SIGNIFICANT CHANGE UP (ref 3.8–10.5)
WBC # FLD AUTO: 7.82 K/UL — SIGNIFICANT CHANGE UP (ref 3.8–10.5)

## 2020-10-05 PROCEDURE — 99285 EMERGENCY DEPT VISIT HI MDM: CPT

## 2020-10-05 PROCEDURE — 93010 ELECTROCARDIOGRAM REPORT: CPT | Mod: 76

## 2020-10-05 PROCEDURE — 71045 X-RAY EXAM CHEST 1 VIEW: CPT | Mod: 26

## 2020-10-05 PROCEDURE — 99222 1ST HOSP IP/OBS MODERATE 55: CPT

## 2020-10-05 RX ORDER — ONDANSETRON 8 MG/1
8 TABLET, FILM COATED ORAL ONCE
Refills: 0 | Status: COMPLETED | OUTPATIENT
Start: 2020-10-05 | End: 2020-10-05

## 2020-10-05 RX ORDER — MORPHINE SULFATE 50 MG/1
2 CAPSULE, EXTENDED RELEASE ORAL ONCE
Refills: 0 | Status: DISCONTINUED | OUTPATIENT
Start: 2020-10-05 | End: 2020-10-05

## 2020-10-05 RX ORDER — PHENOBARBITAL 60 MG
120 TABLET ORAL
Refills: 0 | Status: DISCONTINUED | OUTPATIENT
Start: 2020-10-05 | End: 2020-10-07

## 2020-10-05 RX ORDER — METOCLOPRAMIDE HCL 10 MG
10 TABLET ORAL ONCE
Refills: 0 | Status: COMPLETED | OUTPATIENT
Start: 2020-10-05 | End: 2020-10-05

## 2020-10-05 RX ORDER — PANTOPRAZOLE SODIUM 20 MG/1
40 TABLET, DELAYED RELEASE ORAL
Refills: 0 | Status: DISCONTINUED | OUTPATIENT
Start: 2020-10-05 | End: 2020-10-05

## 2020-10-05 RX ORDER — SENNA PLUS 8.6 MG/1
2 TABLET ORAL AT BEDTIME
Refills: 0 | Status: DISCONTINUED | OUTPATIENT
Start: 2020-10-05 | End: 2020-10-13

## 2020-10-05 RX ORDER — SODIUM CHLORIDE 9 MG/ML
1000 INJECTION, SOLUTION INTRAVENOUS
Refills: 0 | Status: COMPLETED | OUTPATIENT
Start: 2020-10-05 | End: 2020-10-06

## 2020-10-05 RX ORDER — HEPARIN SODIUM 5000 [USP'U]/ML
5000 INJECTION INTRAVENOUS; SUBCUTANEOUS EVERY 12 HOURS
Refills: 0 | Status: DISCONTINUED | OUTPATIENT
Start: 2020-10-05 | End: 2020-10-13

## 2020-10-05 RX ORDER — FOLIC ACID 0.8 MG
1 TABLET ORAL DAILY
Refills: 0 | Status: DISCONTINUED | OUTPATIENT
Start: 2020-10-05 | End: 2020-10-13

## 2020-10-05 RX ORDER — PANTOPRAZOLE SODIUM 20 MG/1
40 TABLET, DELAYED RELEASE ORAL EVERY 12 HOURS
Refills: 0 | Status: DISCONTINUED | OUTPATIENT
Start: 2020-10-05 | End: 2020-10-13

## 2020-10-05 RX ORDER — FAMOTIDINE 10 MG/ML
20 INJECTION INTRAVENOUS ONCE
Refills: 0 | Status: COMPLETED | OUTPATIENT
Start: 2020-10-05 | End: 2020-10-05

## 2020-10-05 RX ORDER — ATORVASTATIN CALCIUM 80 MG/1
20 TABLET, FILM COATED ORAL AT BEDTIME
Refills: 0 | Status: DISCONTINUED | OUTPATIENT
Start: 2020-10-05 | End: 2020-10-13

## 2020-10-05 RX ORDER — KETOROLAC TROMETHAMINE 30 MG/ML
30 SYRINGE (ML) INJECTION ONCE
Refills: 0 | Status: DISCONTINUED | OUTPATIENT
Start: 2020-10-05 | End: 2020-10-05

## 2020-10-05 RX ORDER — THIAMINE MONONITRATE (VIT B1) 100 MG
100 TABLET ORAL ONCE
Refills: 0 | Status: COMPLETED | OUTPATIENT
Start: 2020-10-05 | End: 2020-10-05

## 2020-10-05 RX ORDER — SODIUM CHLORIDE 9 MG/ML
3000 INJECTION INTRAMUSCULAR; INTRAVENOUS; SUBCUTANEOUS ONCE
Refills: 0 | Status: COMPLETED | OUTPATIENT
Start: 2020-10-05 | End: 2020-10-05

## 2020-10-05 RX ORDER — THIAMINE MONONITRATE (VIT B1) 100 MG
100 TABLET ORAL DAILY
Refills: 0 | Status: DISCONTINUED | OUTPATIENT
Start: 2020-10-05 | End: 2020-10-13

## 2020-10-05 RX ADMIN — SODIUM CHLORIDE 3000 MILLILITER(S): 9 INJECTION INTRAMUSCULAR; INTRAVENOUS; SUBCUTANEOUS at 19:53

## 2020-10-05 RX ADMIN — Medication 2 MILLIGRAM(S): at 19:53

## 2020-10-05 RX ADMIN — Medication 4 MILLIGRAM(S): at 23:53

## 2020-10-05 RX ADMIN — FAMOTIDINE 20 MILLIGRAM(S): 10 INJECTION INTRAVENOUS at 19:53

## 2020-10-05 RX ADMIN — ONDANSETRON 8 MILLIGRAM(S): 8 TABLET, FILM COATED ORAL at 19:53

## 2020-10-05 RX ADMIN — Medication 10 MILLIGRAM(S): at 21:07

## 2020-10-05 RX ADMIN — MORPHINE SULFATE 2 MILLIGRAM(S): 50 CAPSULE, EXTENDED RELEASE ORAL at 23:52

## 2020-10-05 RX ADMIN — Medication 30 MILLIGRAM(S): at 23:52

## 2020-10-05 RX ADMIN — SODIUM CHLORIDE 3000 MILLILITER(S): 9 INJECTION INTRAMUSCULAR; INTRAVENOUS; SUBCUTANEOUS at 22:00

## 2020-10-05 RX ADMIN — Medication 100 MILLIGRAM(S): at 19:53

## 2020-10-05 NOTE — H&P ADULT - NSHPREVIEWOFSYSTEMS_GEN_ALL_CORE
Constitutional: no fever, chills, night sweats  Ears: no hearing changes or ear pain,   Nose: no nasal congestion, sinus pain, or rhinorrhea  Cardio: no chest pain, orthopnea, edema, or palpitations  Resp: no dyspnea, cough, wheezing  GI: abdominal pain, nausea, vomiting positive.  No diarrhea, constipation, hematochezia, or melena  : no dysuria, urinary frequency, hematuria  MSK: no back pain, neck pain  Skin: no rash, pruritis   Neuro: no weakness, dizziness, lightheadedness, syncope   Heme/Lymph: no bruising or bleeding

## 2020-10-05 NOTE — ED PROVIDER NOTE - PROGRESS NOTE DETAILS
pt signed out to me from dr treviño, pt has known h/o alcohol abuse and withdrawal, initial lactate is 14, ivf bolus given, pending repeat lactate repeat lactate is 9

## 2020-10-05 NOTE — H&P ADULT - NSHPPHYSICALEXAM_GEN_ALL_CORE
Physical exam:  General: patient in no acute distress, resting comfortably  Head:  Atraumatic, Normocephalic  Eyes: EOMI, PERRLA, clear sclera  Neck: Supple, thyroid nontender, non enlarged  Cardio: S1/S2 +ve, rapid rate  Resp: clear to ausculation bilaterally, no rales or wheezes  GI: abdomen soft, with tenderness in all areas. non acute, non distended, no guarding, BS +ve x 4  Ext: no significant pedal edema  Neuro: CN 2-12 intact, no significant motor or sensory deficits.  Skin: No rashes or lesions

## 2020-10-05 NOTE — H&P ADULT - HISTORY OF PRESENT ILLNESS
Patient is a 55M with a PMH of chronic ETOH abuse with hx of alcohol withdrawal with frequent hospital admissions, alcoholic gastritis/esophagitis, PUD, HLD, hx of hypoxic respiratory failure requiring intubation due to aspiration PNA who presents to the ED for abdominal pain.  Patient is an unreliable historian, denies recent alcohol use.  Reported to triage a recent alcohol binge with last drink yesterday.  Patient also complains of severe headache.  Has no other complaints.  Tachycardic in ED.  Labs show significant lactic acidosis.  Will admit to tele.

## 2020-10-05 NOTE — H&P ADULT - ASSESSMENT
Patient is a 55M with a PMH of chronic ETOH abuse with hx of alcohol withdrawal with frequent hospital admissions, alcoholic gastritis/esophagitis, PUD, HLD, hx of hypoxic respiratory failure requiring intubation due to aspiration PNA who presents to the ED for abdominal pain.  Patient is an unreliable historian, denies recent alcohol use.  Reported to triage a recent alcohol binge with last drink yesterday.  Patient also complains of severe headache.  Has no other complaints.  Tachycardic in ED.  Labs show significant lactic acidosis.  Will admit to tele.     IMPROVE VTE Individual Risk Assessment          RISK                                                          Points  [  ] Previous VTE                                                3  [  ] Thrombophilia                                             2  [  ] Lower limb paralysis                                    2        (unable to hold up >15 seconds)    [  ] Current Cancer                                             2         (within 6 months)  [  ] Immobilization > 24 hrs                              1  [  ] ICU/CCU stay > 24 hours                            1  [  ] Age > 60                                                    1    IMPROVE VTE Score - 1

## 2020-10-05 NOTE — ED PROVIDER NOTE - CLINICAL SUMMARY MEDICAL DECISION MAKING FREE TEXT BOX
Pending labs and admission. CIWA 17. Patient signed out to incoming physician.  All decisions regarding the progression of care will be made at their discretion.

## 2020-10-05 NOTE — ED PROVIDER NOTE - PHYSICAL EXAMINATION
Gen: Alert, Well appearing. NAD  , ++ tremors  Head: NC, AT, PERRL, normal lids/conjunctiva   ENT: Bilateral TM WNL, patent oropharynx without erythema/exudate, uvula midline  Neck: supple, no tenderness/meningismus  Pulm: Bilateral clear BS, normal resp effort  CV: RRR, no M/R/G, +dist pulses   Abd: soft, +epigastric tender, ND, +BS, no guarding/rebound tenderness  Mskel: no edema/erythema/cyanosis   Skin: no rash, no bruising  Neuro: AAOx3, no sensory/motor deficits, CN 2-12 intact

## 2020-10-05 NOTE — H&P ADULT - PROBLEM SELECTOR PLAN 2
CIWA currently 0, recently received ativan  Will continue withdrawal protocol with ativan 4mg IVP q6 standing  Patient is a HIGH RISK for EtOH withdrawal, notably requiring ICU care last month.  Slow taper of benzos.    Thiamine, Folate, MVI daily

## 2020-10-05 NOTE — ED PROVIDER NOTE - OBJECTIVE STATEMENT
54yo male with pmh HL, GERD/gastritis, EtOH, h/o etoh withdrawal/delirium, presents with abd pain, vomiting, tremors. Last drink 2 days ago.     No fever/chills, No photophobia/eye pain/changes in vision, No ear pain/sore throat/dysphagia, No chest pain/palpitations, no SOB/cough, no wheeze/stridor, +abdominal pain, +N/V, D, no dysuria/frequency/discharge, No neck/back pain, no rash, no changes in neurological status/function.

## 2020-10-05 NOTE — H&P ADULT - NSHPLABSRESULTS_GEN_ALL_CORE
Recent Vitals  T(C): 37.2 (10-05-20 @ 18:01), Max: 37.2 (10-05-20 @ 18:01)  HR: 129 (10-05-20 @ 19:39) (129 - 154)  BP: 143/87 (10-05-20 @ 20:18) (143/87 - 146/102)  RR: 20 (10-05-20 @ 19:39) (19 - 20)  SpO2: 95% (10-05-20 @ 19:39) (95% - 95%)                        14.3   7.82  )-----------( 411      ( 05 Oct 2020 19:42 )             43.7     10-05    139  |  97  |  21  ----------------------------<  146<H>  4.0   |  14<L>  |  1.99<H>    Ca    9.5      05 Oct 2020 19:42  Phos  4.6     10-05  Mg     2.5     10-05    TPro  10.3<H>  /  Alb  4.4  /  TBili  0.3  /  DBili  x   /  AST  30  /  ALT  28  /  AlkPhos  58  10-05    PT/INR - ( 05 Oct 2020 19:43 )   PT: 11.7 sec;   INR: 1.01 ratio         PTT - ( 05 Oct 2020 19:43 )  PTT:28.7 sec  LIVER FUNCTIONS - ( 05 Oct 2020 19:42 )  Alb: 4.4 g/dL / Pro: 10.3 gm/dL / ALK PHOS: 58 U/L / ALT: 28 U/L / AST: 30 U/L / GGT: x               Home Medications:

## 2020-10-06 LAB
ANION GAP SERPL CALC-SCNC: 6 MMOL/L — SIGNIFICANT CHANGE UP (ref 5–17)
APPEARANCE UR: CLEAR — SIGNIFICANT CHANGE UP
BACTERIA # UR AUTO: ABNORMAL
BILIRUB UR-MCNC: NEGATIVE — SIGNIFICANT CHANGE UP
BUN SERPL-MCNC: 22 MG/DL — SIGNIFICANT CHANGE UP (ref 7–23)
CALCIUM SERPL-MCNC: 8.1 MG/DL — LOW (ref 8.5–10.1)
CHLORIDE SERPL-SCNC: 111 MMOL/L — HIGH (ref 96–108)
CO2 SERPL-SCNC: 28 MMOL/L — SIGNIFICANT CHANGE UP (ref 22–31)
COLOR SPEC: YELLOW — SIGNIFICANT CHANGE UP
CREAT SERPL-MCNC: 1.39 MG/DL — HIGH (ref 0.5–1.3)
DIFF PNL FLD: ABNORMAL
EPI CELLS # UR: SIGNIFICANT CHANGE UP
GLUCOSE SERPL-MCNC: 104 MG/DL — HIGH (ref 70–99)
GLUCOSE UR QL: NEGATIVE MG/DL — SIGNIFICANT CHANGE UP
KETONES UR-MCNC: ABNORMAL
LACTATE SERPL-SCNC: 1.6 MMOL/L — SIGNIFICANT CHANGE UP (ref 0.7–2)
LEUKOCYTE ESTERASE UR-ACNC: NEGATIVE — SIGNIFICANT CHANGE UP
NITRITE UR-MCNC: NEGATIVE — SIGNIFICANT CHANGE UP
PH UR: 5 — SIGNIFICANT CHANGE UP (ref 5–8)
POTASSIUM SERPL-MCNC: 4.1 MMOL/L — SIGNIFICANT CHANGE UP (ref 3.5–5.3)
POTASSIUM SERPL-SCNC: 4.1 MMOL/L — SIGNIFICANT CHANGE UP (ref 3.5–5.3)
PROT UR-MCNC: 100 MG/DL
RBC CASTS # UR COMP ASSIST: SIGNIFICANT CHANGE UP /HPF (ref 0–4)
SARS-COV-2 IGG SERPL QL IA: NEGATIVE — SIGNIFICANT CHANGE UP
SARS-COV-2 IGM SERPL IA-ACNC: <3.8 AU/ML — SIGNIFICANT CHANGE UP
SODIUM SERPL-SCNC: 145 MMOL/L — SIGNIFICANT CHANGE UP (ref 135–145)
SP GR SPEC: 1.02 — SIGNIFICANT CHANGE UP (ref 1.01–1.02)
UROBILINOGEN FLD QL: NEGATIVE MG/DL — SIGNIFICANT CHANGE UP
WBC UR QL: SIGNIFICANT CHANGE UP

## 2020-10-06 PROCEDURE — 99233 SBSQ HOSP IP/OBS HIGH 50: CPT

## 2020-10-06 RX ORDER — INFLUENZA VIRUS VACCINE 15; 15; 15; 15 UG/.5ML; UG/.5ML; UG/.5ML; UG/.5ML
0.5 SUSPENSION INTRAMUSCULAR ONCE
Refills: 0 | Status: COMPLETED | OUTPATIENT
Start: 2020-10-06 | End: 2020-10-13

## 2020-10-06 RX ORDER — ACETAMINOPHEN 500 MG
650 TABLET ORAL EVERY 6 HOURS
Refills: 0 | Status: DISCONTINUED | OUTPATIENT
Start: 2020-10-06 | End: 2020-10-13

## 2020-10-06 RX ORDER — OXYCODONE HYDROCHLORIDE 5 MG/1
10 TABLET ORAL EVERY 6 HOURS
Refills: 0 | Status: DISCONTINUED | OUTPATIENT
Start: 2020-10-06 | End: 2020-10-13

## 2020-10-06 RX ADMIN — OXYCODONE HYDROCHLORIDE 10 MILLIGRAM(S): 5 TABLET ORAL at 03:38

## 2020-10-06 RX ADMIN — OXYCODONE HYDROCHLORIDE 10 MILLIGRAM(S): 5 TABLET ORAL at 17:33

## 2020-10-06 RX ADMIN — OXYCODONE HYDROCHLORIDE 10 MILLIGRAM(S): 5 TABLET ORAL at 18:33

## 2020-10-06 RX ADMIN — Medication 1 TABLET(S): at 11:49

## 2020-10-06 RX ADMIN — Medication 120 MILLIGRAM(S): at 19:08

## 2020-10-06 RX ADMIN — Medication 120 MILLIGRAM(S): at 12:37

## 2020-10-06 RX ADMIN — OXYCODONE HYDROCHLORIDE 10 MILLIGRAM(S): 5 TABLET ORAL at 11:05

## 2020-10-06 RX ADMIN — OXYCODONE HYDROCHLORIDE 10 MILLIGRAM(S): 5 TABLET ORAL at 03:01

## 2020-10-06 RX ADMIN — HEPARIN SODIUM 5000 UNIT(S): 5000 INJECTION INTRAVENOUS; SUBCUTANEOUS at 05:43

## 2020-10-06 RX ADMIN — Medication 100 MILLIGRAM(S): at 11:49

## 2020-10-06 RX ADMIN — SODIUM CHLORIDE 1000 MILLILITER(S): 9 INJECTION, SOLUTION INTRAVENOUS at 01:52

## 2020-10-06 RX ADMIN — OXYCODONE HYDROCHLORIDE 10 MILLIGRAM(S): 5 TABLET ORAL at 10:05

## 2020-10-06 RX ADMIN — Medication 30 MILLIGRAM(S): at 00:10

## 2020-10-06 RX ADMIN — Medication 120 MILLIGRAM(S): at 14:45

## 2020-10-06 RX ADMIN — Medication 4 MILLIGRAM(S): at 23:03

## 2020-10-06 RX ADMIN — PANTOPRAZOLE SODIUM 40 MILLIGRAM(S): 20 TABLET, DELAYED RELEASE ORAL at 05:42

## 2020-10-06 RX ADMIN — HEPARIN SODIUM 5000 UNIT(S): 5000 INJECTION INTRAVENOUS; SUBCUTANEOUS at 17:33

## 2020-10-06 RX ADMIN — SODIUM CHLORIDE 1000 MILLILITER(S): 9 INJECTION, SOLUTION INTRAVENOUS at 00:53

## 2020-10-06 RX ADMIN — MORPHINE SULFATE 2 MILLIGRAM(S): 50 CAPSULE, EXTENDED RELEASE ORAL at 00:10

## 2020-10-06 RX ADMIN — PANTOPRAZOLE SODIUM 40 MILLIGRAM(S): 20 TABLET, DELAYED RELEASE ORAL at 17:33

## 2020-10-06 RX ADMIN — ATORVASTATIN CALCIUM 20 MILLIGRAM(S): 80 TABLET, FILM COATED ORAL at 21:30

## 2020-10-06 RX ADMIN — Medication 4 MILLIGRAM(S): at 05:42

## 2020-10-06 RX ADMIN — Medication 1 MILLIGRAM(S): at 11:49

## 2020-10-06 RX ADMIN — Medication 4 MILLIGRAM(S): at 11:50

## 2020-10-06 RX ADMIN — Medication 120 MILLIGRAM(S): at 21:30

## 2020-10-06 RX ADMIN — Medication 120 MILLIGRAM(S): at 09:02

## 2020-10-06 RX ADMIN — SENNA PLUS 2 TABLET(S): 8.6 TABLET ORAL at 21:30

## 2020-10-06 RX ADMIN — Medication 4 MILLIGRAM(S): at 17:33

## 2020-10-06 NOTE — PROGRESS NOTE ADULT - SUBJECTIVE AND OBJECTIVE BOX
Patient is a 55y old  Male who presents with a chief complaint of abdominal pain (05 Oct 2020 23:13)      INTERVAL HPI/OVERNIGHT EVENTS: Patient AOx3, hungry. Denies any pain, no chest pain, sob, palpitations.     MEDICATIONS  (STANDING):  atorvastatin 20 milliGRAM(s) Oral at bedtime  folic acid 1 milliGRAM(s) Oral daily  heparin   Injectable 5000 Unit(s) SubCutaneous every 12 hours  influenza   Vaccine 0.5 milliLiter(s) IntraMuscular once  LORazepam   Injectable 4 milliGRAM(s) IV Push every 6 hours  multivitamin 1 Tablet(s) Oral daily  pantoprazole  Injectable 40 milliGRAM(s) IV Push every 12 hours  senna 2 Tablet(s) Oral at bedtime  thiamine 100 milliGRAM(s) Oral daily    MEDICATIONS  (PRN):  acetaminophen   Tablet .. 650 milliGRAM(s) Oral every 6 hours PRN Temp greater or equal to 38C (100.4F), Mild Pain (1 - 3)  oxyCODONE    IR 10 milliGRAM(s) Oral every 6 hours PRN Moderate Pain (4 - 6)  PHENobarbital Injectable 120 milliGRAM(s) IV Push every 1 hour PRN Breakthrough withdrawal      Allergies    No Known Allergies    Intolerances        REVIEW OF SYSTEMS:  CONSTITUTIONAL: No fever, weight loss, or fatigue  EYES: No eye pain, visual disturbances, or discharge  ENMT:  No difficulty hearing, tinnitus, vertigo; No sinus or throat pain  NECK: No pain or stiffness  BREASTS: No pain, masses, or nipple discharge  RESPIRATORY: No cough, wheezing, chills or hemoptysis; No shortness of breath  CARDIOVASCULAR: No chest pain, palpitations, dizziness, or leg swelling  GASTROINTESTINAL: No abdominal or epigastric pain. No nausea, vomiting, or hematemesis; No diarrhea or constipation. No melena or hematochezia.  GENITOURINARY: No dysuria, frequency, hematuria, or incontinence  NEUROLOGICAL: No headaches, memory loss, loss of strength, numbness, or tremors  SKIN: No itching, burning, rashes, or lesions   LYMPH NODES: No enlarged glands  ENDOCRINE: No heat or cold intolerance; No hair loss  MUSCULOSKELETAL: No joint pain or swelling; No muscle, back, or extremity pain  PSYCHIATRIC: No depression, anxiety, mood swings, or difficulty sleeping  HEME/LYMPH: No easy bruising, or bleeding gums  ALLERGY AND IMMUNOLOGIC: No hives or eczema    Vital Signs Last 24 Hrs  T(C): 36.7 (06 Oct 2020 10:42), Max: 37.3 (05 Oct 2020 20:01)  T(F): 98.1 (06 Oct 2020 10:42), Max: 99.1 (05 Oct 2020 20:01)  HR: 89 (06 Oct 2020 10:42) (89 - 154)  BP: 132/84 (06 Oct 2020 10:42) (112/71 - 147/87)  BP(mean): --  RR: 18 (06 Oct 2020 10:42) (17 - 20)  SpO2: 96% (06 Oct 2020 10:42) (95% - 98%)    PHYSICAL EXAM:  GENERAL: NAD, well-groomed, well-developed  HEAD:  Atraumatic, Normocephalic  EYES: EOMI, PERRLA, conjunctiva and sclera clear  ENMT: No tonsillar erythema, exudates, or enlargement; Moist mucous membranes, Good dentition, No lesions  NECK: Supple, No JVD, Normal thyroid  NERVOUS SYSTEM:  Alert & Oriented X3, Good concentration; Motor Strength 5/5 B/L upper and lower extremities; DTRs 2+ intact and symmetric  CHEST/LUNG: Clear to percussion bilaterally; No rales, rhonchi, wheezing, or rubs  HEART: Regular rate and rhythm; No murmurs, rubs, or gallops  ABDOMEN: Soft, Nontender, Nondistended; Bowel sounds present  EXTREMITIES:  2+ Peripheral Pulses, No clubbing, cyanosis, or edema, no tremors observed   LYMPH: No lymphadenopathy noted  SKIN: No rashes or lesions    LABS:                        14.3   7.82  )-----------( 411      ( 05 Oct 2020 19:42 )             43.7     10-05    139  |  97  |  21  ----------------------------<  146<H>  4.0   |  14<L>  |  1.99<H>    Ca    9.5      05 Oct 2020 19:42  Phos  4.6     10-05  Mg     2.5     10-05    TPro  10.3<H>  /  Alb  4.4  /  TBili  0.3  /  DBili  x   /  AST  30  /  ALT  28  /  AlkPhos  58  10-05    PT/INR - ( 05 Oct 2020 19:43 )   PT: 11.7 sec;   INR: 1.01 ratio         PTT - ( 05 Oct 2020 19:43 )  PTT:28.7 sec  Urinalysis Basic - ( 06 Oct 2020 00:50 )    Color: Yellow / Appearance: Clear / S.025 / pH: x  Gluc: x / Ketone: Moderate  / Bili: Negative / Urobili: Negative mg/dL   Blood: x / Protein: 100 mg/dL / Nitrite: Negative   Leuk Esterase: Negative / RBC: 0-2 /HPF / WBC 0-2   Sq Epi: x / Non Sq Epi: Occasional / Bacteria: Occasional      CAPILLARY BLOOD GLUCOSE          RADIOLOGY & ADDITIONAL TESTS:    Imaging Personally Reviewed:  [ ] YES  [ ] NO    Consultant(s) Notes Reviewed:  [ ] YES  [ ] NO    Care Discussed with Consultants/Other Providers [ ] YES  [ ] NO

## 2020-10-06 NOTE — PROGRESS NOTE ADULT - ASSESSMENT
Problem/Plan - 1:  ·  Problem: Chronic alcoholic gastritis without hemorrhage.   continue ppi Iv bid, oxycodone prn      Problem/Plan - 2:  ·  Problem: Alcohol withdrawal syndrome with complication.   -continue CIWA protocol  -continue Ativan taper    -Phenobarbital prn for severe withdrawal   -HIGH risk for ETOH withdrawal - was in ICU last month   -continue thiamine, folate, MVI  -education provided- pt refused any outside help for alcohol, stated he will be going back to [place] for a year soon and will not drink there.      Problem/Plan - 3:  ·  Problem: Lactic acidosis.  -Lactic acid dec from 14 to 1 now      Problem/Plan - 4:  ·  Problem: Dyslipidemia.  Plan: ppi.      Problem/Plan - 5:  ·  Problem: Preventive measure.  Plan: VTE prop: heparin sc q12 hrs.

## 2020-10-07 LAB
ANION GAP SERPL CALC-SCNC: 6 MMOL/L — SIGNIFICANT CHANGE UP (ref 5–17)
BUN SERPL-MCNC: 10 MG/DL — SIGNIFICANT CHANGE UP (ref 7–23)
CALCIUM SERPL-MCNC: 8.3 MG/DL — LOW (ref 8.5–10.1)
CHLORIDE SERPL-SCNC: 104 MMOL/L — SIGNIFICANT CHANGE UP (ref 96–108)
CO2 SERPL-SCNC: 29 MMOL/L — SIGNIFICANT CHANGE UP (ref 22–31)
CREAT SERPL-MCNC: 0.81 MG/DL — SIGNIFICANT CHANGE UP (ref 0.5–1.3)
CULTURE RESULTS: SIGNIFICANT CHANGE UP
GLUCOSE SERPL-MCNC: 86 MG/DL — SIGNIFICANT CHANGE UP (ref 70–99)
POTASSIUM SERPL-MCNC: 3.5 MMOL/L — SIGNIFICANT CHANGE UP (ref 3.5–5.3)
POTASSIUM SERPL-SCNC: 3.5 MMOL/L — SIGNIFICANT CHANGE UP (ref 3.5–5.3)
SODIUM SERPL-SCNC: 139 MMOL/L — SIGNIFICANT CHANGE UP (ref 135–145)
SPECIMEN SOURCE: SIGNIFICANT CHANGE UP

## 2020-10-07 PROCEDURE — 99233 SBSQ HOSP IP/OBS HIGH 50: CPT

## 2020-10-07 RX ORDER — HALOPERIDOL DECANOATE 100 MG/ML
5 INJECTION INTRAMUSCULAR ONCE
Refills: 0 | Status: COMPLETED | OUTPATIENT
Start: 2020-10-07 | End: 2020-10-07

## 2020-10-07 RX ORDER — PHENOBARBITAL 60 MG
260 TABLET ORAL ONCE
Refills: 0 | Status: DISCONTINUED | OUTPATIENT
Start: 2020-10-07 | End: 2020-10-07

## 2020-10-07 RX ORDER — PHENOBARBITAL 60 MG
130 TABLET ORAL
Refills: 0 | Status: DISCONTINUED | OUTPATIENT
Start: 2020-10-07 | End: 2020-10-09

## 2020-10-07 RX ADMIN — HEPARIN SODIUM 5000 UNIT(S): 5000 INJECTION INTRAVENOUS; SUBCUTANEOUS at 18:16

## 2020-10-07 RX ADMIN — Medication 130 MILLIGRAM(S): at 21:39

## 2020-10-07 RX ADMIN — Medication 4 MILLIGRAM(S): at 11:56

## 2020-10-07 RX ADMIN — PANTOPRAZOLE SODIUM 40 MILLIGRAM(S): 20 TABLET, DELAYED RELEASE ORAL at 18:16

## 2020-10-07 RX ADMIN — HALOPERIDOL DECANOATE 5 MILLIGRAM(S): 100 INJECTION INTRAMUSCULAR at 01:46

## 2020-10-07 RX ADMIN — ATORVASTATIN CALCIUM 20 MILLIGRAM(S): 80 TABLET, FILM COATED ORAL at 21:39

## 2020-10-07 RX ADMIN — Medication 100 MILLIGRAM(S): at 11:56

## 2020-10-07 RX ADMIN — HEPARIN SODIUM 5000 UNIT(S): 5000 INJECTION INTRAVENOUS; SUBCUTANEOUS at 06:07

## 2020-10-07 RX ADMIN — Medication 130 MILLIGRAM(S): at 23:16

## 2020-10-07 RX ADMIN — Medication 120 MILLIGRAM(S): at 00:14

## 2020-10-07 RX ADMIN — PANTOPRAZOLE SODIUM 40 MILLIGRAM(S): 20 TABLET, DELAYED RELEASE ORAL at 06:07

## 2020-10-07 RX ADMIN — Medication 130 MILLIGRAM(S): at 20:49

## 2020-10-07 RX ADMIN — Medication 130 MILLIGRAM(S): at 06:47

## 2020-10-07 RX ADMIN — Medication 130 MILLIGRAM(S): at 22:39

## 2020-10-07 RX ADMIN — Medication 2 MILLIGRAM(S): at 01:47

## 2020-10-07 RX ADMIN — Medication 260 MILLIGRAM(S): at 02:15

## 2020-10-07 RX ADMIN — Medication 1 TABLET(S): at 11:56

## 2020-10-07 RX ADMIN — Medication 4 MILLIGRAM(S): at 17:48

## 2020-10-07 RX ADMIN — Medication 4 MILLIGRAM(S): at 06:07

## 2020-10-07 RX ADMIN — Medication 1 MILLIGRAM(S): at 11:56

## 2020-10-07 NOTE — CHART NOTE - NSCHARTNOTEFT_GEN_A_CORE
Called by medicine PA to assess pt for EtOH withdrawal. Pt seen at bedside, CIWA>10. Awake but disoriented, protecting his airway.  At the time of assessment, pt already given Haldol 5 and Ativan 2 IVP.   Will give phenobarb 260mg IVP x1 now and adjust PRN phenobarbital dosing.   Plan discussed with RN, nursing supervisor and PA.  Should pt decompensate, will be available to re-evaluate pt and place formal ICU consult.

## 2020-10-07 NOTE — PROGRESS NOTE ADULT - SUBJECTIVE AND OBJECTIVE BOX
Patient is a 55y old  Male who presents with a chief complaint of abdominal pain (06 Oct 2020 14:21)      OVERNIGHT EVENTS: ciwa >10    REVIEW OF SYSTEMS: denies chest pain/SOB, diaphoresis, no F/C, cough, dizziness, headache, blurry vision, nausea, vomiting, abdominal pain. All others review of systems negative     MEDICATIONS  (STANDING):  atorvastatin 20 milliGRAM(s) Oral at bedtime  folic acid 1 milliGRAM(s) Oral daily  heparin   Injectable 5000 Unit(s) SubCutaneous every 12 hours  influenza   Vaccine 0.5 milliLiter(s) IntraMuscular once  LORazepam   Injectable 4 milliGRAM(s) IV Push every 6 hours  multivitamin 1 Tablet(s) Oral daily  pantoprazole  Injectable 40 milliGRAM(s) IV Push every 12 hours  senna 2 Tablet(s) Oral at bedtime  thiamine 100 milliGRAM(s) Oral daily    MEDICATIONS  (PRN):  acetaminophen   Tablet .. 650 milliGRAM(s) Oral every 6 hours PRN Temp greater or equal to 38C (100.4F), Mild Pain (1 - 3)  oxyCODONE    IR 10 milliGRAM(s) Oral every 6 hours PRN Moderate Pain (4 - 6)  PHENobarbital Injectable 130 milliGRAM(s) IV Push every 30 minutes PRN CIWA>10      Allergies    No Known Allergies    Intolerances        T(F): 98.5 (10-07-20 @ 10:27), Max: 99.2 (10-06-20 @ 16:33)  HR: 72 (10-07-20 @ 10:27) (72 - 102)  BP: 131/79 (10-07-20 @ 10:27) (122/80 - 134/86)  RR: 18 (10-07-20 @ 10:27) (18 - 18)  SpO2: 97% (10-07-20 @ 10:27) (96% - 98%)  Wt(kg): --    PHYSICAL EXAM:  GENERAL: NAD  HEAD:  Atraumatic, Normocephalic  EYES: PERRLA, conjunctiva and sclera clear  ENMT: Moist mucous membranes  NECK: Supple, No JVD, Normal thyroid  NERVOUS SYSTEM:  Alert & Awake  CHEST/LUNG: Clear to percussion bilaterally;   HEART: Regular rate and rhythm;   ABDOMEN: Soft, Nontender, Nondistended; Bowel sounds present  EXTREMITIES:  no edema BL LE  SKIN: moist    LABS:                        14.3   7.82  )-----------( 411      ( 05 Oct 2020 19:42 )             43.7     10-    139  |  104  |  10  ----------------------------<  86  3.5   |  29  |  0.81    Ca    8.3<L>      07 Oct 2020 07:25  Phos  4.6     10-05  Mg     2.5     10-05    TPro  10.3<H>  /  Alb  4.4  /  TBili  0.3  /  DBili  x   /  AST  30  /  ALT  28  /  AlkPhos  58  10-05    PT/INR - ( 05 Oct 2020 19:43 )   PT: 11.7 sec;   INR: 1.01 ratio         PTT - ( 05 Oct 2020 19:43 )  PTT:28.7 sec  Urinalysis Basic - ( 06 Oct 2020 00:50 )    Color: Yellow / Appearance: Clear / S.025 / pH: x  Gluc: x / Ketone: Moderate  / Bili: Negative / Urobili: Negative mg/dL   Blood: x / Protein: 100 mg/dL / Nitrite: Negative   Leuk Esterase: Negative / RBC: 0-2 /HPF / WBC 0-2   Sq Epi: x / Non Sq Epi: Occasional / Bacteria: Occasional      Cultures;   CAPILLARY BLOOD GLUCOSE        Lipid panel:           RADIOLOGY & ADDITIONAL TESTS:    Imaging Personally Reviewed:  [x ] YES      Consultant(s) Notes Reviewed:  [x ] YES     Care Discussed with [x ] Consultants [X ] Patient [ ] Family  [x ]    [x ]  Other; RN

## 2020-10-07 NOTE — PROGRESS NOTE ADULT - ASSESSMENT
Problem/Plan - 1:  ·  Problem: Chronic alcoholic gastritis without hemorrhage.   continue ppi Iv bid, oxycodone prn      Problem/Plan - 2:  ·  Problem: Alcohol withdrawal syndrome with complication.   -continue CIWA protocol  -continue Ativan   -Phenobarbital prn for severe withdrawal   -HIGH risk for ETOH withdrawal - was in ICU last month   -continue thiamine, folate, MVI  -education provided- pt refused any outside help for alcohol     Problem/Plan - 3:  ·  Problem: Lactic acidosis.  -Lactic acid dec from 14 to 1 now      Problem/Plan - 4:  ·  Problem: Dyslipidemia.  Plan: ppi.      Problem/Plan - 5:  ·  Problem: Preventive measure.  Plan: VTE prop: heparin sc q12 hrs.

## 2020-10-07 NOTE — H&P ADULT - PROBLEM SELECTOR PLAN 5
Progress Note - Dr. Pina Banuelos - Internal Medicine  PCP: Annabella Jennings, [address] [facility] [address] [place] / Kalyn Joaquin [phone]    Hospital Day: 6  Code Status: Full Code  Current Diet: DIET GENERAL;        CC: follow up on medical issues    Subjective:   Meme Sears is a 70 y.o. female. She denies problems    No issues  Case d/w son  Awaiting SNF precert     She denies chest pain, denies shortness of breath, denies nausea,  denies emesis. 10 system Review of Systems is reviewed with patient, and pertinent positives are noted in HPI above . Otherwise, Review of systems is negative. I have reviewed the patient's medical and social history in detail and updated the computerized patient record. To recap: She  has a past medical history of CHF (congestive heart failure) ([facility] [address] CVA (cerebral infarction), History of verrucae (wart) excision, Hyperlipidemia, Hypertension, Leg pain, Obstructive apnea, Unspecified cerebral artery occlusion with cerebral infarction, and Valvular disease. . She  has a past surgical history that includes Cardiac defibrillator placement; Abscess Drainage; Dental surgery; and pacemaker placement. . She  reports that she quit smoking about 39 years ago. She has a 0.50 pack-year smoking history. She has never used smokeless tobacco. She reports that she does not drink alcohol or use drugs. .        Active Hospital Problems    Diagnosis Date Noted    Elevated INR [R79.1] 10/01/2020    Hyperlipidemia [E78.5] 10/28/2015    Hemiparesis affecting left side as late effect of cerebrovascular accident [facility]) [I69.354] 09/05/2014    Depression [F32.9] 01/24/2014    Atrial fibrillation [I48.91] 06/20/2011       Current Facility-Administered Medications: warfarin (COUMADIN) tablet 1.25 mg, 1.25 mg, Oral, Daily  bisacodyl (DULCOLAX) suppository 10 mg, 10 mg, Rectal, Daily PRN  lip balm petroleum free (PHYTOPLEX) stick, , Topical, PRN  aspirin EC tablet 81 mg, 81 mg, Oral, Daily  zolpidem (AMBIEN) tablet 5 mg, 5 mg, Oral, Nightly PRN  hydroCHLOROthiazide (HYDRODIURIL) tablet 25 mg, 25 mg, Oral, QAM  venlafaxine (EFFEXOR) tablet 75 mg, 75 mg, Oral, Daily  sacubitril-valsartan (ENTRESTO) 24-26 MG per tablet 1 tablet, 1 tablet, Oral, BID  digoxin (LANOXIN) tablet 125 mcg, 125 mcg, Oral, Daily  carvedilol (COREG) tablet 3.125 mg, 3.125 mg, Oral, BID WC  divalproex (DEPAKOTE ER) extended release tablet 250 mg, 250 mg, Oral, Daily  sodium chloride flush 0.9 % injection 10 mL, 10 mL, Intravenous, 2 times per day  sodium chloride flush 0.9 % injection 10 mL, 10 mL, Intravenous, PRN  potassium chloride (KLOR-CON M) extended release tablet 40 mEq, 40 mEq, Oral, PRN **OR** potassium bicarb-citric acid (EFFER-K) effervescent tablet 40 mEq, 40 mEq, Oral, PRN **OR** potassium chloride 10 mEq/100 mL IVPB (Peripheral Line), 10 mEq, Intravenous, PRN  acetaminophen (TYLENOL) tablet 650 mg, 650 mg, Oral, Q6H PRN **OR** acetaminophen (TYLENOL) suppository 650 mg, 650 mg, Rectal, Q6H PRN  magnesium hydroxide (MILK OF MAGNESIA) 400 MG/5ML suspension 30 mL, 30 mL, Oral, Daily PRN  promethazine (PHENERGAN) tablet 12.5 mg, 12.5 mg, Oral, Q6H PRN **OR** ondansetron (ZOFRAN) injection 4 mg, 4 mg, Intravenous, Q6H PRN  famotidine (PEPCID) tablet 20 mg, 20 mg, Oral, BID PRN  rosuvastatin (CRESTOR) tablet 5 mg, 5 mg, Oral, Nightly         Objective:  /69   Pulse 79   Temp 98 °F (36.7 °C) (Temporal)   Resp 18   Ht 5' 7\" (1.702 m)   Wt 177 lb 11.1 oz (80.6 kg)   SpO2 93%   BMI 27.83 kg/m²      Patient Vitals for the past 24 hrs:   BP Temp Temp src Pulse Resp SpO2 Weight   10/07/20 0800 100/69 98 °F (36.7 °C) Temporal 79 18 93 % --   10/07/20 0549 -- -- -- -- -- -- 177 lb 11.1 oz (80.6 kg)   10/07/20 0303 94/61 97.9 °F (36.6 °C) Oral 80 16 -- --   10/06/20 2356 101/64 98 °F (36.7 °C) Oral 82 16 -- --   10/06/20 1959 106/62 98.2 °F (36.8 °C) Oral 81 16 93 % --   10/06/20 1822 95/63 98 °F (36.7 °C) Oral 80 18 92 % --   10/06/20 1205 107/70 98.1 °F (36.7 °C) Axillary 91 18 93 % --   10/06/20 0930 109/75 97.4 °F (36.3 °C) Oral 80 18 94 % --     Patient Vitals for the past 96 hrs (Last 3 readings):   Weight   10/07/20 0549 177 lb 11.1 oz (80.6 kg)   10/06/20 0736 178 lb 5 oz (80.9 kg)   10/04/20 0800 179 lb 1 oz (81.2 kg)         No intake or output data in the 24 hours ending 10/07/20 0847      Physical Exam:   /69   Pulse 79   Temp 98 °F (36.7 °C) (Temporal)   Resp 18   Ht 5' 7\" (1.702 m)   Wt 177 lb 11.1 oz (80.6 kg)   SpO2 93%   BMI 27.83 kg/m²   General appearance: alert, appears stated age and cooperative  Head: Normocephalic, without obvious abnormality, atraumatic  Lungs: clear to auscultation bilaterally  Heart: regular rate and rhythm, S1, S2 normal, no murmur, click, rub or gallop  Abdomen: soft, non-tender; bowel sounds normal; no masses,  no organomegaly  Extremities: extremities normal, atraumatic, no cyanosis or edema    Labs:  Lab Results   Component Value Date    WBC 11.6 (H) 10/07/2020    HGB 14.1 10/07/2020    HCT 41.8 10/07/2020     10/07/2020    CHOL 176 11/01/2019    TRIG 146 11/01/2019    HDL 64 (H) 11/01/2019    LDLDIRECT 116 (H) 04/24/2015    ALT 23 10/02/2020    AST 42 (H) 10/02/2020     10/07/2020    K 4.2 10/07/2020    CL 95 (L) 10/07/2020    CREATININE 0.5 (L) 10/07/2020    BUN 12 10/07/2020    CO2 32 10/07/2020    TSH 1.53 10/16/2013    INR 2.72 (H) 10/07/2020    LABMICR YES 10/01/2020     Lab Results   Component Value Date    TROPONINI <0.01 10/01/2020       Recent Imaging Results are Reviewed:  Ct Head Wo Contrast    Result Date: 9/30/2020  EXAMINATION: CT OF THE HEAD WITHOUT CONTRAST  9/30/2020 11:28 pm TECHNIQUE: CT of the head and cervical spine was performed without the administration of intravenous contrast. Dose modulation, iterative reconstruction, and/or weight based adjustment of the mA/kV was utilized to reduce the radiation dose to as low as reasonably achievable.; CT of the cervical spine was performed without the administration of intravenous contrast. Multiplanar reformatted images are provided for review. Dose modulation, iterative reconstruction, and/or weight based adjustment of the mA/kV was utilized to reduce the radiation dose to as low as reasonably achievable. COMPARISON: None. HISTORY: ORDERING SYSTEM PROVIDED HISTORY: fall, inr 13 TECHNOLOGIST PROVIDED HISTORY: If patient is on cardiac monitor and/or pulse ox, they may be taken off cardiac monitor and pulse ox, left on O2 if currently on. All monitors reattached when patient returns to room. Has a \"code stroke\" or \"stroke alert\" been called? ->No Reason for exam:->fall, inr 13 Reason for Exam: fall, inr 13. Acuity: Acute Type of Exam: Initial FINDINGS: Head: The study is somewhat degraded by the patient's motion. BRAIN/VENTRICLES: There is no definite acute intracranial hemorrhage, mass effect or midline shift. Large area of encephalomalacia involving the right frontal temporoparietal lobes, MCA territory. No abnormal extra-axial fluid collection. The gray-white differentiation is maintained without evidence of an acute infarct. There is no evidence of hydrocephalus. ORBITS: The visualized portion of the orbits demonstrate no acute abnormality. SINUSES: The visualized paranasal sinuses and mastoid air cells demonstrate no acute abnormality. SOFT TISSUES/SKULL:  No acute abnormality of the visualized skull or soft tissues. Cervical spine: BONES/ALIGNMENT: There is no acute fracture or traumatic malalignment. DEGENERATIVE CHANGES: Mild to moderate multilevel cervical spondylosis, most prominent C5-C6. SOFT TISSUES: Unremarkable prevertebral soft tissues. Ill-defined thyroid nodules noted measuring up to 1.1 cm in the right lobe. Vascular calcification is seen. No apical pneumothorax. Head CT: Somewhat degraded study by the patient's motion. No definite acute intracranial finding within the limitations of the study. Cervical spine: No acute fracture or subluxation. Ct Cervical Spine Wo Contrast    Result Date: 9/30/2020  EXAMINATION: CT OF THE HEAD WITHOUT CONTRAST  9/30/2020 11:28 pm TECHNIQUE: CT of the head and cervical spine was performed without the administration of intravenous contrast. Dose modulation, iterative reconstruction, and/or weight based adjustment of the mA/kV was utilized to reduce the radiation dose to as low as reasonably achievable.; CT of the cervical spine was performed without the administration of intravenous contrast. Multiplanar reformatted images are provided for review. Dose modulation, iterative reconstruction, and/or weight based adjustment of the mA/kV was utilized to reduce the radiation dose to as low as reasonably achievable. COMPARISON: None. HISTORY: ORDERING SYSTEM PROVIDED HISTORY: fall, inr 13 TECHNOLOGIST PROVIDED HISTORY: If patient is on cardiac monitor and/or pulse ox, they may be taken off cardiac monitor and pulse ox, left on O2 if currently on. All monitors reattached when patient returns to room. Has a \"code stroke\" or \"stroke alert\" been called? ->No Reason for exam:->fall, inr 13 Reason for Exam: fall, inr 13. Acuity: Acute Type of Exam: Initial FINDINGS: Head: The study is somewhat degraded by the patient's motion. BRAIN/VENTRICLES: There is no definite acute intracranial hemorrhage, mass effect or midline shift. Large area of encephalomalacia involving the right frontal temporoparietal lobes, MCA territory. No abnormal extra-axial fluid collection. The gray-white differentiation is maintained without evidence of an acute infarct. There is no evidence of hydrocephalus. ORBITS: The visualized portion of the orbits demonstrate no acute abnormality. SINUSES: The visualized paranasal sinuses and mastoid air cells demonstrate no acute abnormality. SOFT TISSUES/SKULL:  No acute abnormality of the visualized skull or soft tissues.  Cervical spine: BONES/ALIGNMENT: There is no acute fracture or traumatic malalignment. DEGENERATIVE CHANGES: Mild to moderate multilevel cervical spondylosis, most prominent C5-C6. SOFT TISSUES: Unremarkable prevertebral soft tissues. Ill-defined thyroid nodules noted measuring up to 1.1 cm in the right lobe. Vascular calcification is seen. No apical pneumothorax. Head CT: Somewhat degraded study by the patient's motion. No definite acute intracranial finding within the limitations of the study. Cervical spine: No acute fracture or subluxation. Xr Chest Portable    Result Date: 10/1/2020  EXAMINATION: ONE XRAY VIEW OF THE CHEST 10/1/2020 1:19 am COMPARISON: 05/18/2009 HISTORY: ORDERING SYSTEM PROVIDED HISTORY: right sided chest pain TECHNOLOGIST PROVIDED HISTORY: Reason for exam:->right sided chest pain Reason for Exam: right sided chest pain Acuity: Acute Type of Exam: Initial Mechanism of Injury: patient fell Relevant Medical/Surgical History: previous CHF and hypertension FINDINGS: There is a dual lead ICD on the left. Cardiac size at the upper limits of normal.  COPD. Elevated right hemidiaphragm. Calcified lymph nodes related to remote granulomatous process. No pneumothorax is identified. No acute osseous abnormality is identified. Perihilar atelectatic change. Perihilar atelectasis with elevated right hemidiaphragm. Assessment and Plan:  Principal Problem:    Elevated INR -Established problem. Improving. INR now 2.72  Plan: continue on coumadin dosing per pharmacy. To continue followup with anticoagulation clinic  Active Problems:    Atrial fibrillation -Established problem. Stable. In sinus now  Plan: Continue present orders/plan. Depression -Established problem. Stable. Mood ok  Plan: Continue present orders/plan. Hemiparesis affecting left side as late effect of cerebrovascular accident [facility])  Plan: SNF recommended    Hyperlipidemia  Plan: Continue present orders/plan.      Disp - to SNF when set up  Pt has been medically stable and discharged since 10/5            Odalis Cleary  10/7/2020 Protonix BID

## 2020-10-08 LAB
ANION GAP SERPL CALC-SCNC: 7 MMOL/L — SIGNIFICANT CHANGE UP (ref 5–17)
BUN SERPL-MCNC: 9 MG/DL — SIGNIFICANT CHANGE UP (ref 7–23)
CALCIUM SERPL-MCNC: 9.5 MG/DL — SIGNIFICANT CHANGE UP (ref 8.5–10.1)
CHLORIDE SERPL-SCNC: 106 MMOL/L — SIGNIFICANT CHANGE UP (ref 96–108)
CO2 SERPL-SCNC: 27 MMOL/L — SIGNIFICANT CHANGE UP (ref 22–31)
CREAT SERPL-MCNC: 0.87 MG/DL — SIGNIFICANT CHANGE UP (ref 0.5–1.3)
GLUCOSE SERPL-MCNC: 90 MG/DL — SIGNIFICANT CHANGE UP (ref 70–99)
MAGNESIUM SERPL-MCNC: 2.2 MG/DL — SIGNIFICANT CHANGE UP (ref 1.6–2.6)
PHOSPHATE SERPL-MCNC: 2 MG/DL — LOW (ref 2.5–4.5)
POTASSIUM SERPL-MCNC: 3.6 MMOL/L — SIGNIFICANT CHANGE UP (ref 3.5–5.3)
POTASSIUM SERPL-SCNC: 3.6 MMOL/L — SIGNIFICANT CHANGE UP (ref 3.5–5.3)
SODIUM SERPL-SCNC: 140 MMOL/L — SIGNIFICANT CHANGE UP (ref 135–145)

## 2020-10-08 PROCEDURE — 90792 PSYCH DIAG EVAL W/MED SRVCS: CPT

## 2020-10-08 PROCEDURE — 99233 SBSQ HOSP IP/OBS HIGH 50: CPT

## 2020-10-08 RX ORDER — PHENOBARBITAL 60 MG
64.8 TABLET ORAL
Refills: 0 | Status: DISCONTINUED | OUTPATIENT
Start: 2020-10-08 | End: 2020-10-09

## 2020-10-08 RX ADMIN — Medication 130 MILLIGRAM(S): at 21:44

## 2020-10-08 RX ADMIN — ATORVASTATIN CALCIUM 20 MILLIGRAM(S): 80 TABLET, FILM COATED ORAL at 21:23

## 2020-10-08 RX ADMIN — Medication 4 MILLIGRAM(S): at 05:22

## 2020-10-08 RX ADMIN — HEPARIN SODIUM 5000 UNIT(S): 5000 INJECTION INTRAVENOUS; SUBCUTANEOUS at 17:14

## 2020-10-08 RX ADMIN — Medication 130 MILLIGRAM(S): at 14:17

## 2020-10-08 RX ADMIN — Medication 130 MILLIGRAM(S): at 01:36

## 2020-10-08 RX ADMIN — SENNA PLUS 2 TABLET(S): 8.6 TABLET ORAL at 21:23

## 2020-10-08 RX ADMIN — Medication 1 MILLIGRAM(S): at 11:08

## 2020-10-08 RX ADMIN — PANTOPRAZOLE SODIUM 40 MILLIGRAM(S): 20 TABLET, DELAYED RELEASE ORAL at 05:22

## 2020-10-08 RX ADMIN — PANTOPRAZOLE SODIUM 40 MILLIGRAM(S): 20 TABLET, DELAYED RELEASE ORAL at 17:14

## 2020-10-08 RX ADMIN — Medication 64.8 MILLIGRAM(S): at 23:41

## 2020-10-08 RX ADMIN — Medication 4 MILLIGRAM(S): at 11:08

## 2020-10-08 RX ADMIN — Medication 130 MILLIGRAM(S): at 02:43

## 2020-10-08 RX ADMIN — Medication 130 MILLIGRAM(S): at 22:38

## 2020-10-08 RX ADMIN — Medication 4 MILLIGRAM(S): at 00:09

## 2020-10-08 RX ADMIN — Medication 1 TABLET(S): at 11:08

## 2020-10-08 RX ADMIN — Medication 64.8 MILLIGRAM(S): at 17:14

## 2020-10-08 RX ADMIN — Medication 130 MILLIGRAM(S): at 00:33

## 2020-10-08 RX ADMIN — Medication 100 MILLIGRAM(S): at 11:08

## 2020-10-08 RX ADMIN — HEPARIN SODIUM 5000 UNIT(S): 5000 INJECTION INTRAVENOUS; SUBCUTANEOUS at 05:22

## 2020-10-08 RX ADMIN — Medication 130 MILLIGRAM(S): at 23:09

## 2020-10-08 NOTE — PROGRESS NOTE ADULT - SUBJECTIVE AND OBJECTIVE BOX
Patient is a 55y old  Male who presents with a chief complaint of abdominal pain (07 Oct 2020 13:39)      OVERNIGHT EVENTS:    REVIEW OF SYSTEMS: denies chest pain/SOB, diaphoresis, no F/C, cough, dizziness, headache, blurry vision, nausea, vomiting, abdominal pain. All others review of systems negative     MEDICATIONS  (STANDING):  atorvastatin 20 milliGRAM(s) Oral at bedtime  folic acid 1 milliGRAM(s) Oral daily  heparin   Injectable 5000 Unit(s) SubCutaneous every 12 hours  influenza   Vaccine 0.5 milliLiter(s) IntraMuscular once  multivitamin 1 Tablet(s) Oral daily  pantoprazole  Injectable 40 milliGRAM(s) IV Push every 12 hours  senna 2 Tablet(s) Oral at bedtime  thiamine 100 milliGRAM(s) Oral daily    MEDICATIONS  (PRN):  acetaminophen   Tablet .. 650 milliGRAM(s) Oral every 6 hours PRN Temp greater or equal to 38C (100.4F), Mild Pain (1 - 3)  oxyCODONE    IR 10 milliGRAM(s) Oral every 6 hours PRN Moderate Pain (4 - 6)  PHENobarbital Injectable 130 milliGRAM(s) IV Push every 30 minutes PRN CIWA>10      Allergies    No Known Allergies    Intolerances        T(F): 99.3 (10-08-20 @ 11:42), Max: 99.3 (10-08-20 @ 11:42)  HR: 116 (10-08-20 @ 11:42) (68 - 116)  BP: 138/96 (10-08-20 @ 11:42) (126/80 - 148/89)  RR: 17 (10-08-20 @ 11:42) (17 - 18)  SpO2: 97% (10-08-20 @ 11:42) (95% - 98%)  Wt(kg): --    PHYSICAL EXAM:  GENERAL: NAD  HEAD:  Atraumatic, Normocephalic  EYES: PERRLA, conjunctiva and sclera clear  ENMT: Moist mucous membranes  NECK: Supple, No JVD, Normal thyroid  NERVOUS SYSTEM:  Alert & Awake  CHEST/LUNG: Clear to percussion bilaterally;   HEART: Regular rate and rhythm;   ABDOMEN: Soft, Nontender, Nondistended; Bowel sounds present  EXTREMITIES:  no edema BL LE  SKIN: moist    LABS:    10-08    140  |  106  |  9   ----------------------------<  90  3.6   |  27  |  0.87    Ca    9.5      08 Oct 2020 07:17  Phos  2.0     10-08  Mg     2.2     10-08          Cultures;   CAPILLARY BLOOD GLUCOSE        Lipid panel:           RADIOLOGY & ADDITIONAL TESTS:    Imaging Personally Reviewed:  [x ] YES      Consultant(s) Notes Reviewed:  [x ] YES     Care Discussed with [x ] Consultants [X ] Patient [ ] Family  [x ]    [x ]  Other; RN

## 2020-10-08 NOTE — BEHAVIORAL HEALTH ASSESSMENT NOTE - NSBHCHARTREVIEWLAB_PSY_A_CORE FT
10-08    140  |  106  |  9   ----------------------------<  90  3.6   |  27  |  0.87    Ca    9.5      08 Oct 2020 07:17  Phos  2.0     10-08  Mg     2.2     10-08

## 2020-10-08 NOTE — BEHAVIORAL HEALTH ASSESSMENT NOTE - NSBHCONSULTMEDS_PSY_A_CORE FT
try symptom triggered CIWA protocol and bridge with PRNs; seems to be confused on IVP Ativan and may not need it standing TAPER: phenobarb 64.8mg PO 4x/da x 1 day then reduce to TID tomorrow and BID then qd (needs to be manually changed daily due to [facility] order issue)

## 2020-10-08 NOTE — BEHAVIORAL HEALTH ASSESSMENT NOTE - NSBHCHARTREVIEWIMAGING_PSY_A_CORE FT
CT Abdomen and Pelvis No Cont (09.10.20 @ 17:31); Mildly distended fluid-filled stomach secondary to gastroparesis or partial gastric outlet obstruction.  Nonspecific ascending colitis.  Small sliding hiatus hernia with thickened distal esophagus. Recommendendoscopy. Distended urinary bladder.  Hepatic steatosis.

## 2020-10-08 NOTE — BEHAVIORAL HEALTH ASSESSMENT NOTE - HPI (INCLUDE ILLNESS QUALITY, SEVERITY, DURATION, TIMING, CONTEXT, MODIFYING FACTORS, ASSOCIATED SIGNS AND SYMPTOMS)
PATIENT KNOWN TO WRITER WHO LAST SAW HIM 9/2020: 54 yo [place]  [place] male, lives with his son in a private home, works as a , with long history of continuous Alcohol Abuse (at most reports 2-3 bottles of vodka 1-2/day), with associated numerous ED visits and hospital admissions (ie. epigastric pain, vomiting, nausea, hematemesis, aspiration pneumonia, esophageal ulcer, UGI bleed) presenting again with c/o "presents with abd pain, vomiting, tremors. Last drink 2 days ago" yet his BAL was 230 on admission.     EXAM: Patient is sitting on the side of his bed trying to use the bedside phone to make a phone call. He is disorganized and confused, and keeps pushing the "1, 2, 3" phone buttons over and over again. Not even putting received near his ear and blankly staring at it. Staff at bedside asks if they can assist calling a number which he is not able to cooperate with. Patient does not make eye contact with Writer despite numerous attempts, does not verbally engage and remains preoccupied with the phone buttons. He is heavy lidded with slowed movements consistent with being on IVP Ativan. As per [facility] staff, Patient has been restless, intermittently agitated, trying to get up and leave saying "nothing is wrong with me," he makes illogical statements and looks/acts confused.     ISTOP Reference #: [id number]   04/14/2020 04/15/2020 chlordiazepoxide 25 mg capsule  20 5 Hailey Garrett A, Medicaid [facility] #[id number] PATIENT KNOWN TO WRITER WHO LAST SAW HIM 9/2020: 56 yo [place]  [place] male, lives with his son in a private home, works as a , with long history of continuous Alcohol Abuse (at most reports 2-3 bottles of vodka 1-2/day), with associated numerous ED visits and hospital admissions (ie. epigastric pain, vomiting, nausea, hematemesis, aspiration pneumonia, esophageal ulcer, UGI bleed) presenting again with c/o "presents with abd pain, vomiting, tremors. Last drink 2 days ago" yet his BAL was 230 on admission.     EXAM: Patient lying in bed, semi-squirming, disorganized and confused. Talking non-sensically.     ISTOP Reference #: [id number]   04/14/2020 04/15/2020 chlordiazepoxide 25 mg capsule  20 5 Hailey Garrett A, Medicaid [facility] #[id number]

## 2020-10-08 NOTE — BEHAVIORAL HEALTH ASSESSMENT NOTE - NSBHCHARTREVIEWVS_PSY_A_CORE FT
T(C): 37.4 (10-08-20 @ 11:42), Max: 37.4 (10-08-20 @ 11:42)  HR: 116 (10-08-20 @ 11:42) (68 - 116)  BP: 138/96 (10-08-20 @ 11:42) (126/80 - 148/89)  RR: 17 (10-08-20 @ 11:42) (17 - 18)  SpO2: 97% (10-08-20 @ 11:42) (95% - 98%)

## 2020-10-08 NOTE — BEHAVIORAL HEALTH ASSESSMENT NOTE - NSBHCONSULTMEDSEVERE_PSY_A_CORE FT
haldol 3mg IVP q6hrs PRN for safety secondary to disorganized behavior (ie trying to leave, stand while fall risk) QTc is within normal limits QTc prolonged - do not use haldol

## 2020-10-08 NOTE — BEHAVIORAL HEALTH ASSESSMENT NOTE - SUMMARY
acute delirium  - presented with "intoxication" and not withdrawal. He was still intoxicated on the day of ED presentation. Severe withdrawal symptoms usually occur 2-3 days after last drink   - vitals have been stable thus far, no clinical signs of severe withdrawal sxs (granted on IVP Phenobarb which he got 1 dose only today at 2pm); unclear why both Ativan and phenobarb used and Ativan standing taper was discontinued   - could try symptom-triggered CIWA protocol as opposed to IVP Ativan  - NO capacity to leave AMA/make his medical decisions

## 2020-10-09 PROCEDURE — 99233 SBSQ HOSP IP/OBS HIGH 50: CPT

## 2020-10-09 PROCEDURE — 99231 SBSQ HOSP IP/OBS SF/LOW 25: CPT

## 2020-10-09 RX ORDER — PHENOBARBITAL 60 MG
64.8 TABLET ORAL THREE TIMES A DAY
Refills: 0 | Status: DISCONTINUED | OUTPATIENT
Start: 2020-10-09 | End: 2020-10-09

## 2020-10-09 RX ADMIN — Medication 130 MILLIGRAM(S): at 02:54

## 2020-10-09 RX ADMIN — ATORVASTATIN CALCIUM 20 MILLIGRAM(S): 80 TABLET, FILM COATED ORAL at 21:49

## 2020-10-09 RX ADMIN — PANTOPRAZOLE SODIUM 40 MILLIGRAM(S): 20 TABLET, DELAYED RELEASE ORAL at 18:02

## 2020-10-09 RX ADMIN — Medication 1 MILLIGRAM(S): at 11:33

## 2020-10-09 RX ADMIN — SENNA PLUS 2 TABLET(S): 8.6 TABLET ORAL at 21:49

## 2020-10-09 RX ADMIN — PANTOPRAZOLE SODIUM 40 MILLIGRAM(S): 20 TABLET, DELAYED RELEASE ORAL at 05:11

## 2020-10-09 RX ADMIN — Medication 64.8 MILLIGRAM(S): at 05:16

## 2020-10-09 RX ADMIN — Medication 2 MILLIGRAM(S): at 20:18

## 2020-10-09 RX ADMIN — Medication 130 MILLIGRAM(S): at 01:40

## 2020-10-09 RX ADMIN — Medication 130 MILLIGRAM(S): at 04:19

## 2020-10-09 RX ADMIN — Medication 130 MILLIGRAM(S): at 03:26

## 2020-10-09 RX ADMIN — Medication 130 MILLIGRAM(S): at 05:11

## 2020-10-09 RX ADMIN — Medication 1 TABLET(S): at 11:33

## 2020-10-09 RX ADMIN — HEPARIN SODIUM 5000 UNIT(S): 5000 INJECTION INTRAVENOUS; SUBCUTANEOUS at 05:11

## 2020-10-09 RX ADMIN — HEPARIN SODIUM 5000 UNIT(S): 5000 INJECTION INTRAVENOUS; SUBCUTANEOUS at 18:02

## 2020-10-09 RX ADMIN — Medication 100 MILLIGRAM(S): at 11:33

## 2020-10-09 NOTE — PROGRESS NOTE BEHAVIORAL HEALTH - NSBHCHARTREVIEWVS_PSY_A_CORE FT
T(C): 36.9 (10-09-20 @ 04:58), Max: 37.4 (10-08-20 @ 11:42)  HR: 90 (10-09-20 @ 04:58) (90 - 116)  BP: 126/84 (10-09-20 @ 06:36) (102/74 - 139/95)  RR: 18 (10-09-20 @ 04:58) (17 - 18)  SpO2: 97% (10-09-20 @ 04:58) (95% - 97%)

## 2020-10-09 NOTE — PROGRESS NOTE BEHAVIORAL HEALTH - OTHER
superficially cooperative tremors not noted deferred at this time does not verbally engage with Writer does not verbalize looks confused unable to ascertain due to limitation of exam does not seem to be responding ot internal stimuli

## 2020-10-09 NOTE — PROGRESS NOTE ADULT - SUBJECTIVE AND OBJECTIVE BOX
Patient is a 55y old  Male who presents with a chief complaint of abdominal pain (08 Oct 2020 14:48)      OVERNIGHT EVENTS: ciwa 8    REVIEW OF SYSTEMS: denies chest pain/SOB, diaphoresis, no F/C, cough, dizziness, headache, blurry vision, nausea, vomiting, abdominal pain. All others review of systems negative     MEDICATIONS  (STANDING):  atorvastatin 20 milliGRAM(s) Oral at bedtime  folic acid 1 milliGRAM(s) Oral daily  heparin   Injectable 5000 Unit(s) SubCutaneous every 12 hours  influenza   Vaccine 0.5 milliLiter(s) IntraMuscular once  multivitamin 1 Tablet(s) Oral daily  pantoprazole  Injectable 40 milliGRAM(s) IV Push every 12 hours  senna 2 Tablet(s) Oral at bedtime  thiamine 100 milliGRAM(s) Oral daily    MEDICATIONS  (PRN):  acetaminophen   Tablet .. 650 milliGRAM(s) Oral every 6 hours PRN Temp greater or equal to 38C (100.4F), Mild Pain (1 - 3)  oxyCODONE    IR 10 milliGRAM(s) Oral every 6 hours PRN Moderate Pain (4 - 6)      Allergies    No Known Allergies    Intolerances        T(F): 98.3 (10-09-20 @ 10:23), Max: 98.8 (10-08-20 @ 23:50)  HR: 109 (10-09-20 @ 10:23) (90 - 109)  BP: 131/95 (10-09-20 @ 10:23) (102/74 - 139/95)  RR: 18 (10-09-20 @ 10:23) (18 - 18)  SpO2: 96% (10-09-20 @ 10:23) (95% - 97%)  Wt(kg): --    PHYSICAL EXAM:  GENERAL: NAD  HEAD:  Atraumatic, Normocephalic  EYES: PERRLA, conjunctiva and sclera clear  ENMT: Moist mucous membranes  NECK: Supple, No JVD, Normal thyroid  NERVOUS SYSTEM:  Alert & Awake  CHEST/LUNG: Clear to percussion bilaterally;   HEART: Regular rate and rhythm;   ABDOMEN: Soft, Nontender, Nondistended; Bowel sounds present  EXTREMITIES:  no edema BL LE  SKIN: moist    LABS:    10-08    140  |  106  |  9   ----------------------------<  90  3.6   |  27  |  0.87    Ca    9.5      08 Oct 2020 07:17  Phos  2.0     10-08  Mg     2.2     10-08          Cultures;   CAPILLARY BLOOD GLUCOSE        Lipid panel:           RADIOLOGY & ADDITIONAL TESTS:    Imaging Personally Reviewed:  [x ] YES      Consultant(s) Notes Reviewed:  [x ] YES     Care Discussed with [x ] Consultants [X ] Patient [ ] Family  [x ]    [x ]  Other; RN

## 2020-10-09 NOTE — ED ADULT NURSE NOTE - NSFALLRSKUNASSIST_ED_ALL_ED
Cycle 27 day 1 Maintenance  Bortezamab  Nursing Assessment Note      Date:  Aug 28, 2018    Name:  Mela Momin   :  1933    Diagnosis: Primary C90.00 - Multiple myeloma, without mention of having achieved remission,  Diagnosed   (Active)    Treatment Regimen:     Bortezomib (Maint)    Last Treatment Date:  Aug 15, 2018    Toxicities:  There are no reported toxicities.    Vital Signs:  Performed on Aug 28, 2018 13:16  Height - 149.90 cms   Weight - 41.6 kg (HIGH)   BSA - 1.32 sq.m   BMI - 18.5100   Temperature - 97.0 F   Pulse - 70 /min   Respiration - 16 /min   BP - 99/61 mm(hg)   Pain - 0      Allergies:  No Known Allergies.  Allergies were reviewed.  No new allergies.    Medications:   The medication list was reviewed. No changes noted.    Labs reviewed with the patient:  yes - copy provided.    Contraceptive Review:  NA: Patient is post menopausal     Note:   s/o:  Pt. here for sister Cycle 27 day 1 Bortezamab,   Pt. was seen and examined by  Dr. Dr. Segovia and okay to proceed., pt. states she is feeling okay   Pt. aware to call if diarrhea occurs.    Administered the injection per pt. request, as she did the last time.  Side effects reviewed.   Labs reviewed and okay to treat. Velcade given as ordered without incidence in  abdomen per Katherine RODRIGUES  .  Discharge instructions reviewed, refer to note.   Calendar reviewed.    a:  Cycle  27  day 1 Maintenance  Bortezamab  p:  RTC per calendar, pt. instructed to call with any problems ,questions or concerns.  Pt. verbalized understanding.  Electronically signed by,    Eun Delgado        no

## 2020-10-10 PROCEDURE — 99233 SBSQ HOSP IP/OBS HIGH 50: CPT

## 2020-10-10 RX ORDER — POTASSIUM PHOSPHATE, MONOBASIC POTASSIUM PHOSPHATE, DIBASIC 236; 224 MG/ML; MG/ML
15 INJECTION, SOLUTION INTRAVENOUS ONCE
Refills: 0 | Status: DISCONTINUED | OUTPATIENT
Start: 2020-10-10 | End: 2020-10-13

## 2020-10-10 RX ADMIN — HEPARIN SODIUM 5000 UNIT(S): 5000 INJECTION INTRAVENOUS; SUBCUTANEOUS at 05:58

## 2020-10-10 RX ADMIN — ATORVASTATIN CALCIUM 20 MILLIGRAM(S): 80 TABLET, FILM COATED ORAL at 21:06

## 2020-10-10 RX ADMIN — Medication 1 MILLIGRAM(S): at 11:24

## 2020-10-10 RX ADMIN — Medication 1 TABLET(S): at 11:25

## 2020-10-10 RX ADMIN — SENNA PLUS 2 TABLET(S): 8.6 TABLET ORAL at 21:02

## 2020-10-10 RX ADMIN — Medication 2 MILLIGRAM(S): at 21:01

## 2020-10-10 RX ADMIN — HEPARIN SODIUM 5000 UNIT(S): 5000 INJECTION INTRAVENOUS; SUBCUTANEOUS at 18:19

## 2020-10-10 RX ADMIN — Medication 100 MILLIGRAM(S): at 11:24

## 2020-10-10 RX ADMIN — Medication 2 MILLIGRAM(S): at 04:48

## 2020-10-10 NOTE — PROGRESS NOTE ADULT - SUBJECTIVE AND OBJECTIVE BOX
Patient is a 55y old  Male who presents with a chief complaint of abdominal pain (09 Oct 2020 13:08)      OVERNIGHT EVENTS:    REVIEW OF SYSTEMS: denies chest pain/SOB, diaphoresis, no F/C, cough, dizziness, headache, blurry vision, nausea, vomiting, abdominal pain. All others review of systems negative     MEDICATIONS  (STANDING):  atorvastatin 20 milliGRAM(s) Oral at bedtime  folic acid 1 milliGRAM(s) Oral daily  heparin   Injectable 5000 Unit(s) SubCutaneous every 12 hours  influenza   Vaccine 0.5 milliLiter(s) IntraMuscular once  multivitamin 1 Tablet(s) Oral daily  pantoprazole  Injectable 40 milliGRAM(s) IV Push every 12 hours  senna 2 Tablet(s) Oral at bedtime  thiamine 100 milliGRAM(s) Oral daily    MEDICATIONS  (PRN):  acetaminophen   Tablet .. 650 milliGRAM(s) Oral every 6 hours PRN Temp greater or equal to 38C (100.4F), Mild Pain (1 - 3)  guaiFENesin   Syrup  (Sugar-Free) 100 milliGRAM(s) Oral every 6 hours PRN Cough  LORazepam   Injectable 2 milliGRAM(s) IV Push every 6 hours PRN Agitation  oxyCODONE    IR 10 milliGRAM(s) Oral every 6 hours PRN Moderate Pain (4 - 6)      Allergies    No Known Allergies    Intolerances        T(F): 97.8 (10-10-20 @ 05:46), Max: 99.5 (10-09-20 @ 17:24)  HR: 103 (10-10-20 @ 05:46) (100 - 109)  BP: 131/90 (10-10-20 @ 05:46) (116/83 - 135/98)  RR: 18 (10-10-20 @ 05:46) (18 - 18)  SpO2: 96% (10-10-20 @ 05:46) (94% - 96%)  Wt(kg): --    PHYSICAL EXAM:  GENERAL: NAD  HEAD:  Atraumatic, Normocephalic  EYES: PERRLA, conjunctiva and sclera clear  ENMT: Moist mucous membranes  NECK: Supple, No JVD, Normal thyroid  NERVOUS SYSTEM:  Alert & Awake  CHEST/LUNG: Clear to percussion bilaterally;   HEART: Regular rate and rhythm;   ABDOMEN: Soft, Nontender, Nondistended; Bowel sounds present  EXTREMITIES:  no edema BL LE  SKIN: moist    LABS:              Cultures;   CAPILLARY BLOOD GLUCOSE        Lipid panel:           RADIOLOGY & ADDITIONAL TESTS:    Imaging Personally Reviewed:  [x ] YES      Consultant(s) Notes Reviewed:  [x ] YES     Care Discussed with [x ] Consultants [X ] Patient [ ] Family  [x ]    [x ]  Other; RN

## 2020-10-10 NOTE — PROGRESS NOTE ADULT - ASSESSMENT
55 M with HLD, GERD, EtOH, frequently admitted to [facility] for ETOH w/d (at least once monthly for years), alcoholic gastritis/esophagitis, PUD, HLD, hx of hypoxic respiratory failure requiring intubation due to aspiration PNA admitted 10/5 with ETOH intoxication and presented with epigastric pain and lactic acidosis (14.4). Course was complicated by worsening agitation and delirium. Patient is an unreliable historian, denies recent alcohol use.  Reported to triage a recent alcohol binge.      Alcohol withdrawal syndrome with complication.   -continue CIWA protocol  -continue Ativan   -Phenobarbital prn for severe withdrawal   -HIGH risk for ETOH withdrawal - was in ICU last month   -continue thiamine, folate, MVI  -education provided- pt refused any outside help for alcohol    acute metabolic encephalopathy due to above    Lactic acidosis.  -Lactic acid dec from 14 to 1 now     Chronic alcoholic gastritis without hemorrhage.   -continue ppi, oxycodone prn  Dyslipidemia.     chronic alcoholic hepatitis. hepatic steatosis.  education on ETOH cessation provided. education on high risk of developing liver cirrhosis if patient does not quit ETOH and complications of liver cirrhosis discussed at length.     Chronic low back pain. cont tylenol prn.     Preventive measure.  Plan: VTE prop: heparin sc q12 hrs.    55 M with HLD, GERD, EtOH, frequently admitted to [facility] for ETOH w/d (at least once monthly for years), alcoholic gastritis/esophagitis, PUD, HLD, hx of hypoxic respiratory failure requiring intubation due to aspiration PNA admitted 10/5 with ETOH intoxication and presented with epigastric pain and lactic acidosis (14.4). Course was complicated by worsening agitation and delirium. Patient is an unreliable historian, denies recent alcohol use.  Reported to triage a recent alcohol binge.      Alcohol withdrawal syndrome with complication.   -continue CIWA protocol  -continue Ativan   -Phenobarbital prn for severe withdrawal   -HIGH risk for ETOH withdrawal - was in ICU last month   -continue thiamine, folate, MVI  -education provided- pt refused any outside help for alcohol    acute metabolic encephalopathy due to above    Hypophosphatemia  -replace electrolytes and monitor    Lactic acidosis.  -Lactic acid dec from 14 to 1 now     Chronic alcoholic gastritis without hemorrhage.   -continue ppi, oxycodone prn      Dyslipidemia.   -Lipitor    chronic alcoholic hepatitis. hepatic steatosis.  education on ETOH cessation provided. education on high risk of developing liver cirrhosis if patient does not quit ETOH and complications of liver cirrhosis discussed at length.     Chronic low back pain. cont tylenol prn.     Preventive measure.  Plan: VTE prop: heparin sc q12 hrs.

## 2020-10-11 LAB
ANION GAP SERPL CALC-SCNC: 9 MMOL/L — SIGNIFICANT CHANGE UP (ref 5–17)
BUN SERPL-MCNC: 18 MG/DL — SIGNIFICANT CHANGE UP (ref 7–23)
CALCIUM SERPL-MCNC: 9.2 MG/DL — SIGNIFICANT CHANGE UP (ref 8.5–10.1)
CHLORIDE SERPL-SCNC: 107 MMOL/L — SIGNIFICANT CHANGE UP (ref 96–108)
CO2 SERPL-SCNC: 24 MMOL/L — SIGNIFICANT CHANGE UP (ref 22–31)
CREAT SERPL-MCNC: 0.95 MG/DL — SIGNIFICANT CHANGE UP (ref 0.5–1.3)
GLUCOSE SERPL-MCNC: 84 MG/DL — SIGNIFICANT CHANGE UP (ref 70–99)
MAGNESIUM SERPL-MCNC: 2.5 MG/DL — SIGNIFICANT CHANGE UP (ref 1.6–2.6)
PHOSPHATE SERPL-MCNC: 3.1 MG/DL — SIGNIFICANT CHANGE UP (ref 2.5–4.5)
POTASSIUM SERPL-MCNC: 3.4 MMOL/L — LOW (ref 3.5–5.3)
POTASSIUM SERPL-SCNC: 3.4 MMOL/L — LOW (ref 3.5–5.3)
SODIUM SERPL-SCNC: 140 MMOL/L — SIGNIFICANT CHANGE UP (ref 135–145)

## 2020-10-11 PROCEDURE — 76937 US GUIDE VASCULAR ACCESS: CPT | Mod: 26,59

## 2020-10-11 PROCEDURE — 99233 SBSQ HOSP IP/OBS HIGH 50: CPT

## 2020-10-11 PROCEDURE — 36569 INSJ PICC 5 YR+ W/O IMAGING: CPT

## 2020-10-11 RX ORDER — POTASSIUM CHLORIDE 20 MEQ
20 PACKET (EA) ORAL ONCE
Refills: 0 | Status: COMPLETED | OUTPATIENT
Start: 2020-10-11 | End: 2020-10-11

## 2020-10-11 RX ADMIN — HEPARIN SODIUM 5000 UNIT(S): 5000 INJECTION INTRAVENOUS; SUBCUTANEOUS at 06:09

## 2020-10-11 RX ADMIN — Medication 100 MILLIGRAM(S): at 11:20

## 2020-10-11 RX ADMIN — ATORVASTATIN CALCIUM 20 MILLIGRAM(S): 80 TABLET, FILM COATED ORAL at 21:46

## 2020-10-11 RX ADMIN — Medication 2 MILLIGRAM(S): at 19:53

## 2020-10-11 RX ADMIN — HEPARIN SODIUM 5000 UNIT(S): 5000 INJECTION INTRAVENOUS; SUBCUTANEOUS at 17:17

## 2020-10-11 RX ADMIN — SENNA PLUS 2 TABLET(S): 8.6 TABLET ORAL at 21:46

## 2020-10-11 RX ADMIN — Medication 1 TABLET(S): at 11:20

## 2020-10-11 RX ADMIN — Medication 1 MILLIGRAM(S): at 11:20

## 2020-10-11 RX ADMIN — Medication 20 MILLIEQUIVALENT(S): at 14:44

## 2020-10-11 RX ADMIN — Medication 2 MILLIGRAM(S): at 04:13

## 2020-10-11 NOTE — PROCEDURE NOTE - NSPROCDETAILS_GEN_ALL_CORE
location identified, draped/prepped, sterile technique used/ultrasound guidance/supine position/sterile dressing applied/ultrasound assessment

## 2020-10-11 NOTE — PROGRESS NOTE ADULT - SUBJECTIVE AND OBJECTIVE BOX
Patient is a 55y old  Male who presents with a chief complaint of abdominal pain (10 Oct 2020 10:08)      OVERNIGHT EVENTS: none     REVIEW OF SYSTEMS: denies chest pain/SOB, diaphoresis, no F/C, cough, dizziness, headache, blurry vision, nausea, vomiting, abdominal pain. All others review of systems negative     MEDICATIONS  (STANDING):  atorvastatin 20 milliGRAM(s) Oral at bedtime  folic acid 1 milliGRAM(s) Oral daily  heparin   Injectable 5000 Unit(s) SubCutaneous every 12 hours  influenza   Vaccine 0.5 milliLiter(s) IntraMuscular once  multivitamin 1 Tablet(s) Oral daily  pantoprazole  Injectable 40 milliGRAM(s) IV Push every 12 hours  potassium phosphate IVPB 15 milliMole(s) IV Intermittent once  senna 2 Tablet(s) Oral at bedtime  thiamine 100 milliGRAM(s) Oral daily    MEDICATIONS  (PRN):  acetaminophen   Tablet .. 650 milliGRAM(s) Oral every 6 hours PRN Temp greater or equal to 38C (100.4F), Mild Pain (1 - 3)  guaiFENesin   Syrup  (Sugar-Free) 100 milliGRAM(s) Oral every 6 hours PRN Cough  LORazepam   Injectable 2 milliGRAM(s) IV Push every 6 hours PRN Agitation  oxyCODONE    IR 10 milliGRAM(s) Oral every 6 hours PRN Moderate Pain (4 - 6)      Allergies    No Known Allergies    Intolerances        T(F): 98.9 (10-11-20 @ 10:35), Max: 98.9 (10-11-20 @ 10:35)  HR: 79 (10-11-20 @ 10:35) (76 - 97)  BP: 123/93 (10-11-20 @ 10:35) (106/67 - 125/87)  RR: 16 (10-11-20 @ 10:35) (16 - 19)  SpO2: 98% (10-11-20 @ 10:35) (95% - 98%)  Wt(kg): --    PHYSICAL EXAM:  GENERAL: NAD  HEAD:  Atraumatic, Normocephalic  EYES: PERRLA, conjunctiva and sclera clear  ENMT: Moist mucous membranes  NECK: Supple, No JVD, Normal thyroid  NERVOUS SYSTEM:  Alert & Awake  CHEST/LUNG: Clear to percussion bilaterally;   HEART: Regular rate and rhythm;   ABDOMEN: Soft, Nontender, Nondistended; Bowel sounds present  EXTREMITIES:  no edema BL LE  SKIN: moist    LABS:    10-11    140  |  107  |  18  ----------------------------<  84  3.4<L>   |  24  |  0.95    Ca    9.2      11 Oct 2020 07:13  Phos  3.1     10-11  Mg     2.5     10-11          Cultures;   CAPILLARY BLOOD GLUCOSE        Lipid panel:           RADIOLOGY & ADDITIONAL TESTS:    Imaging Personally Reviewed:  [x ] YES      Consultant(s) Notes Reviewed:  [x ] YES     Care Discussed with [x ] Consultants [X ] Patient [ ] Family  [x ]    [x ]  Other; RN

## 2020-10-11 NOTE — PROGRESS NOTE ADULT - ASSESSMENT
55 M with HLD, GERD, EtOH, frequently admitted to [facility] for ETOH w/d (at least once monthly for years), alcoholic gastritis/esophagitis, PUD, HLD, hx of hypoxic respiratory failure requiring intubation due to aspiration PNA admitted 10/5 with ETOH intoxication and presented with epigastric pain and lactic acidosis (14.4). Course was complicated by worsening agitation and delirium. Patient is an unreliable historian, denies recent alcohol use.  Reported to triage a recent alcohol binge.      Alcohol withdrawal syndrome with complication.   -continue CIWA protocol  -continue Ativan   -Phenobarbital prn for severe withdrawal   -HIGH risk for ETOH withdrawal - was in ICU last month   -continue thiamine, folate, MVI  -education provided- pt refused any outside help for alcohol    acute metabolic encephalopathy due to above    Hypophosphatemia  -replace electrolytes and monitor    Lactic acidosis.  -Lactic acid dec from 14 to 1 now     Chronic alcoholic gastritis without hemorrhage.   -continue ppi, oxycodone prn      Dyslipidemia.   -Lipitor    chronic alcoholic hepatitis. hepatic steatosis.  education on ETOH cessation provided. education on high risk of developing liver cirrhosis if patient does not quit ETOH and complications of liver cirrhosis discussed at length.     Chronic low back pain. cont tylenol prn.     Preventive measure.  Plan: VTE prop: heparin sc q12 hrs.

## 2020-10-11 NOTE — PROCEDURE NOTE - NSINFORMCONSENT_GEN_A_CORE
patient's son-NOK/Benefits, risks, and possible complications of procedure explained to patient/caregiver who verbalized understanding and gave verbal consent.

## 2020-10-11 NOTE — PROCEDURE NOTE - ADDITIONAL PROCEDURE DETAILS
Hospitalist Medicine NP    Procedure: Midline Placement    Patient seen at bedside for midline catheter placement.  Consent obtained from family after risks/benefits/alternatives explained.     Upper extremity prepped and draped in usual sterile fashion. Time out performed with RN. 20G  15 cm single lumen midline catheter placed using ultrasound guided Seldinger technique,  L  basilic vein. Flushes well, with good venous return. Patient tolerated procedure well without complication and was left in no acute distress.

## 2020-10-12 LAB
ANION GAP SERPL CALC-SCNC: 8 MMOL/L — SIGNIFICANT CHANGE UP (ref 5–17)
BUN SERPL-MCNC: 19 MG/DL — SIGNIFICANT CHANGE UP (ref 7–23)
CALCIUM SERPL-MCNC: 9 MG/DL — SIGNIFICANT CHANGE UP (ref 8.5–10.1)
CHLORIDE SERPL-SCNC: 105 MMOL/L — SIGNIFICANT CHANGE UP (ref 96–108)
CO2 SERPL-SCNC: 25 MMOL/L — SIGNIFICANT CHANGE UP (ref 22–31)
CREAT SERPL-MCNC: 1.12 MG/DL — SIGNIFICANT CHANGE UP (ref 0.5–1.3)
GLUCOSE SERPL-MCNC: 80 MG/DL — SIGNIFICANT CHANGE UP (ref 70–99)
MAGNESIUM SERPL-MCNC: 2.5 MG/DL — SIGNIFICANT CHANGE UP (ref 1.6–2.6)
PHOSPHATE SERPL-MCNC: 3.2 MG/DL — SIGNIFICANT CHANGE UP (ref 2.5–4.5)
POTASSIUM SERPL-MCNC: 3.5 MMOL/L — SIGNIFICANT CHANGE UP (ref 3.5–5.3)
POTASSIUM SERPL-SCNC: 3.5 MMOL/L — SIGNIFICANT CHANGE UP (ref 3.5–5.3)
SODIUM SERPL-SCNC: 138 MMOL/L — SIGNIFICANT CHANGE UP (ref 135–145)

## 2020-10-12 PROCEDURE — 99231 SBSQ HOSP IP/OBS SF/LOW 25: CPT

## 2020-10-12 PROCEDURE — 99233 SBSQ HOSP IP/OBS HIGH 50: CPT

## 2020-10-12 RX ADMIN — PANTOPRAZOLE SODIUM 40 MILLIGRAM(S): 20 TABLET, DELAYED RELEASE ORAL at 17:47

## 2020-10-12 RX ADMIN — Medication 1 MILLIGRAM(S): at 11:12

## 2020-10-12 RX ADMIN — SENNA PLUS 2 TABLET(S): 8.6 TABLET ORAL at 21:58

## 2020-10-12 RX ADMIN — PANTOPRAZOLE SODIUM 40 MILLIGRAM(S): 20 TABLET, DELAYED RELEASE ORAL at 05:59

## 2020-10-12 RX ADMIN — HEPARIN SODIUM 5000 UNIT(S): 5000 INJECTION INTRAVENOUS; SUBCUTANEOUS at 05:59

## 2020-10-12 RX ADMIN — Medication 100 MILLIGRAM(S): at 11:12

## 2020-10-12 RX ADMIN — Medication 2 MILLIGRAM(S): at 09:27

## 2020-10-12 RX ADMIN — HEPARIN SODIUM 5000 UNIT(S): 5000 INJECTION INTRAVENOUS; SUBCUTANEOUS at 17:47

## 2020-10-12 RX ADMIN — Medication 1 TABLET(S): at 11:12

## 2020-10-12 RX ADMIN — ATORVASTATIN CALCIUM 20 MILLIGRAM(S): 80 TABLET, FILM COATED ORAL at 21:58

## 2020-10-12 NOTE — PROGRESS NOTE BEHAVIORAL HEALTH - NSBHCHARTREVIEWVS_PSY_A_CORE FT
T(C): 36.6 (10-12-20 @ 10:20), Max: 37 (10-11-20 @ 23:50)  HR: 83 (10-12-20 @ 10:20) (79 - 96)  BP: 106/65 (10-12-20 @ 10:20) (106/65 - 114/77)  RR: 16 (10-12-20 @ 10:20) (16 - 18)  SpO2: 98% (10-12-20 @ 10:20) (95% - 98%)

## 2020-10-12 NOTE — DIETITIAN INITIAL EVALUATION ADULT. - PHYSICAL APPEARANCE
BMI= 25.7. 1+ generalized edema (10/8), no edema documented since 10/8. Pt declined nutrition-focused physical exam. Per limited visual observation no signs of muscle/fat loss noted in- temples, orbitals/buccals./well nourished

## 2020-10-12 NOTE — PROGRESS NOTE BEHAVIORAL HEALTH - OTHER
improved - lower end of fair improving deferred at this time soft but audible "tired" looks less confused

## 2020-10-12 NOTE — PROGRESS NOTE BEHAVIORAL HEALTH - NSBHCHARTREVIEWLAB_PSY_A_CORE FT
10-12    138  |  105  |  19  ----------------------------<  80  3.5   |  25  |  1.12    Ca    9.0      12 Oct 2020 07:23  Phos  3.2     10-12  Mg     2.5     10-12

## 2020-10-12 NOTE — PROGRESS NOTE ADULT - SUBJECTIVE AND OBJECTIVE BOX
Patient is a 55y old  Male who presents with a chief complaint of abdominal pain (11 Oct 2020 12:25)      OVERNIGHT EVENTS: ciwa 9    REVIEW OF SYSTEMS: denies chest pain/SOB, diaphoresis, no F/C, cough, dizziness, headache, blurry vision, nausea, vomiting, abdominal pain. All others review of systems negative     MEDICATIONS  (STANDING):  atorvastatin 20 milliGRAM(s) Oral at bedtime  folic acid 1 milliGRAM(s) Oral daily  heparin   Injectable 5000 Unit(s) SubCutaneous every 12 hours  influenza   Vaccine 0.5 milliLiter(s) IntraMuscular once  multivitamin 1 Tablet(s) Oral daily  pantoprazole  Injectable 40 milliGRAM(s) IV Push every 12 hours  potassium phosphate IVPB 15 milliMole(s) IV Intermittent once  senna 2 Tablet(s) Oral at bedtime  thiamine 100 milliGRAM(s) Oral daily    MEDICATIONS  (PRN):  acetaminophen   Tablet .. 650 milliGRAM(s) Oral every 6 hours PRN Temp greater or equal to 38C (100.4F), Mild Pain (1 - 3)  guaiFENesin   Syrup  (Sugar-Free) 100 milliGRAM(s) Oral every 6 hours PRN Cough  LORazepam   Injectable 2 milliGRAM(s) IV Push every 6 hours PRN Agitation  oxyCODONE    IR 10 milliGRAM(s) Oral every 6 hours PRN Moderate Pain (4 - 6)      Allergies    No Known Allergies    Intolerances        T(F): 97.9 (10-12-20 @ 05:10), Max: 98.6 (10-11-20 @ 23:50)  HR: 83 (10-12-20 @ 05:10) (79 - 96)  BP: 114/77 (10-12-20 @ 05:10) (108/81 - 114/77)  RR: 18 (10-12-20 @ 05:10) (18 - 18)  SpO2: 96% (10-12-20 @ 05:10) (95% - 98%)  Wt(kg): --    PHYSICAL EXAM:  GENERAL: NAD  HEAD:  Atraumatic, Normocephalic  EYES: PERRLA, conjunctiva and sclera clear  ENMT: Moist mucous membranes  NECK: Supple, No JVD, Normal thyroid  NERVOUS SYSTEM:  Alert & Awake  CHEST/LUNG: Clear to percussion bilaterally;   HEART: Regular rate and rhythm;   ABDOMEN: Soft, Nontender, Nondistended; Bowel sounds present  EXTREMITIES:  no edema BL LE  SKIN: moist    LABS:    10-12    138  |  105  |  19  ----------------------------<  80  3.5   |  25  |  1.12    Ca    9.0      12 Oct 2020 07:23  Phos  3.2     10-12  Mg     2.5     10-12          Cultures;   CAPILLARY BLOOD GLUCOSE        Lipid panel:           RADIOLOGY & ADDITIONAL TESTS:    Imaging Personally Reviewed:  [x ] YES      Consultant(s) Notes Reviewed:  [x ] YES     Care Discussed with [x ] Consultants [X ] Patient [ ] Family  [x ]    [x ]  Other; RN

## 2020-10-12 NOTE — DIETITIAN INITIAL EVALUATION ADULT. - ENERGY NEEDS
Height (cm): 170.2 (10-06)  Weight (kg): 74.5 (10-06)  BMI (kg/m2): 25.7 (10-06)  IBW: 67 kg     % IBW: 111%      UBW: 75 kg    %UBW: 99%

## 2020-10-12 NOTE — PROGRESS NOTE ADULT - ASSESSMENT
55 M with HLD, GERD, EtOH, frequently admitted to [facility] for ETOH w/d (at least once monthly for years), alcoholic gastritis/esophagitis, PUD, HLD, hx of hypoxic respiratory failure requiring intubation due to aspiration PNA admitted 10/5 with ETOH intoxication and presented with epigastric pain and lactic acidosis (14.4). Course was complicated by worsening agitation and delirium. Patient is an unreliable historian, denies recent alcohol use.  Reported to triage a recent alcohol binge.      Alcohol withdrawal syndrome with complication.   -continue CIWA protocol  -continue Ativan   -Phenobarbital prn for severe withdrawal   -HIGH risk for ETOH withdrawal - was in ICU last month   -continue thiamine, folate, MVI  -education provided- pt refused any outside help for alcohol    acute metabolic encephalopathy due to above    Hypophosphatemia  -replace electrolytes and monitor    Lactic acidosis.  -Lactic acid dec from 14 to 1 now     Chronic alcoholic gastritis without hemorrhage.   -continue ppi, oxycodone prn      Dyslipidemia.   -Lipitor    chronic alcoholic hepatitis. hepatic steatosis.  education on ETOH cessation provided. education on high risk of developing liver cirrhosis if patient does not quit ETOH and complications of liver cirrhosis discussed at length.     Chronic low back pain. cont tylenol prn.     Preventive measure.  Plan: VTE prop: heparin sc q12 hrs.

## 2020-10-12 NOTE — DIETITIAN INITIAL EVALUATION ADULT. - PERTINENT LABORATORY DATA
10-12 Na138 mmol/L Glu 80 mg/dL K+ 3.5 mmol/L Cr  1.12 mg/dL BUN 19 mg/dL 10-12 Phos 3.2 mg/dL 10-05 Alb 4.4 g/dL

## 2020-10-12 NOTE — PROGRESS NOTE BEHAVIORAL HEALTH - NSBHCONSULTMEDS_PSY_A_CORE FT
given excessive amount of IVP phenobarb used overnight, Patient NEEDS TO CLEAR OUT/do a wash out and use symptom-triggered CIWA only
Patient has been at [facility] for > 1 week now and is past acute alcohol withdrawal phase; moreover, he was already effectively treated for it during the first several days. He needs to "clear out" of all substance at this time to have his MSE return to baseline so he can be discharged.

## 2020-10-12 NOTE — PROGRESS NOTE BEHAVIORAL HEALTH - NSBHCONSULTFOLLOWDETAILS_PSY_A_CORE FT
cannot leave AMA; no capacity to leave AMA at this time
cannot leave AMA; no capacity to leave AMA at this time

## 2020-10-12 NOTE — DIETITIAN INITIAL EVALUATION ADULT. - PERTINENT MEDS FT
MEDICATIONS  (STANDING):  atorvastatin 20 milliGRAM(s) Oral at bedtime  folic acid 1 milliGRAM(s) Oral daily  heparin   Injectable 5000 Unit(s) SubCutaneous every 12 hours  influenza   Vaccine 0.5 milliLiter(s) IntraMuscular once  multivitamin 1 Tablet(s) Oral daily  pantoprazole  Injectable 40 milliGRAM(s) IV Push every 12 hours  potassium phosphate IVPB 15 milliMole(s) IV Intermittent once  senna 2 Tablet(s) Oral at bedtime  thiamine 100 milliGRAM(s) Oral daily    MEDICATIONS  (PRN):  acetaminophen   Tablet .. 650 milliGRAM(s) Oral every 6 hours PRN Temp greater or equal to 38C (100.4F), Mild Pain (1 - 3)  guaiFENesin   Syrup  (Sugar-Free) 100 milliGRAM(s) Oral every 6 hours PRN Cough  LORazepam   Injectable 2 milliGRAM(s) IV Push every 6 hours PRN Agitation  oxyCODONE    IR 10 milliGRAM(s) Oral every 6 hours PRN Moderate Pain (4 - 6)

## 2020-10-12 NOTE — DIETITIAN INITIAL EVALUATION ADULT. - OTHER INFO
Pt adm w/abdominal pain. Pt known to this RD due to multiple recent adm for same dx. Met w/pt at bedside, pt seems forgetful/confused, able to provide some answers. Pt reports good appetite and PO intake, unable to recall breakfast meal. Pt denies N/V/D/C or abdominal pain/discomfort. Pt denies difficulty chewing/swallowing. Per chart PTA pt lived w/son. Pt unable to recall UBW, per previous adm record wt (9/11/20)= 75.2 kg. Encouraged pt to increase/maintain good PO intake.

## 2020-10-13 ENCOUNTER — TRANSCRIPTION ENCOUNTER (OUTPATIENT)
Age: 55
End: 2020-10-13

## 2020-10-13 VITALS
RESPIRATION RATE: 17 BRPM | OXYGEN SATURATION: 97 % | HEART RATE: 77 BPM | SYSTOLIC BLOOD PRESSURE: 124 MMHG | TEMPERATURE: 98 F | DIASTOLIC BLOOD PRESSURE: 75 MMHG

## 2020-10-13 DIAGNOSIS — R41.0 DISORIENTATION, UNSPECIFIED: ICD-10-CM

## 2020-10-13 DIAGNOSIS — F10.20 ALCOHOL DEPENDENCE, UNCOMPLICATED: ICD-10-CM

## 2020-10-13 LAB
ANION GAP SERPL CALC-SCNC: 9 MMOL/L — SIGNIFICANT CHANGE UP (ref 5–17)
BUN SERPL-MCNC: 15 MG/DL — SIGNIFICANT CHANGE UP (ref 7–23)
CALCIUM SERPL-MCNC: 8.5 MG/DL — SIGNIFICANT CHANGE UP (ref 8.5–10.1)
CHLORIDE SERPL-SCNC: 107 MMOL/L — SIGNIFICANT CHANGE UP (ref 96–108)
CO2 SERPL-SCNC: 23 MMOL/L — SIGNIFICANT CHANGE UP (ref 22–31)
CREAT SERPL-MCNC: 0.92 MG/DL — SIGNIFICANT CHANGE UP (ref 0.5–1.3)
GLUCOSE SERPL-MCNC: 67 MG/DL — LOW (ref 70–99)
MAGNESIUM SERPL-MCNC: 2.3 MG/DL — SIGNIFICANT CHANGE UP (ref 1.6–2.6)
PHOSPHATE SERPL-MCNC: 3.1 MG/DL — SIGNIFICANT CHANGE UP (ref 2.5–4.5)
POTASSIUM SERPL-MCNC: 3.7 MMOL/L — SIGNIFICANT CHANGE UP (ref 3.5–5.3)
POTASSIUM SERPL-SCNC: 3.7 MMOL/L — SIGNIFICANT CHANGE UP (ref 3.5–5.3)
SODIUM SERPL-SCNC: 139 MMOL/L — SIGNIFICANT CHANGE UP (ref 135–145)

## 2020-10-13 PROCEDURE — 99231 SBSQ HOSP IP/OBS SF/LOW 25: CPT

## 2020-10-13 PROCEDURE — 99239 HOSP IP/OBS DSCHRG MGMT >30: CPT

## 2020-10-13 RX ORDER — PETROLATUM,WHITE
1 JELLY (GRAM) TOPICAL DAILY
Refills: 0 | Status: DISCONTINUED | OUTPATIENT
Start: 2020-10-13 | End: 2020-10-13

## 2020-10-13 RX ADMIN — PANTOPRAZOLE SODIUM 40 MILLIGRAM(S): 20 TABLET, DELAYED RELEASE ORAL at 05:02

## 2020-10-13 RX ADMIN — HEPARIN SODIUM 5000 UNIT(S): 5000 INJECTION INTRAVENOUS; SUBCUTANEOUS at 05:01

## 2020-10-13 RX ADMIN — Medication 1 TABLET(S): at 11:19

## 2020-10-13 RX ADMIN — Medication 100 MILLIGRAM(S): at 11:19

## 2020-10-13 RX ADMIN — INFLUENZA VIRUS VACCINE 0.5 MILLILITER(S): 15; 15; 15; 15 SUSPENSION INTRAMUSCULAR at 11:56

## 2020-10-13 RX ADMIN — Medication 1 MILLIGRAM(S): at 11:19

## 2020-10-13 NOTE — PHYSICAL THERAPY INITIAL EVALUATION ADULT - TINETTI BALANCE TEST, REHAB EVAL
Sitting Balance -1 /1 , Rises From Chair - 2/2, Attempts to rise - 2/2 , Immediate Standing Balance -2 /2,  Standing Balance -2 /2, Nudged - 2 /2, Eyes Closed - 1/1,  Turning 360 Deg - 1 /2, Sitting down -2 /2, Balance Score -15 /16

## 2020-10-13 NOTE — PROGRESS NOTE BEHAVIORAL HEALTH - NSBHCHARTREVIEWLAB_PSY_A_CORE FT
10-13    139  |  107  |  15  ----------------------------<  67<L>  3.7   |  23  |  0.92    Ca    8.5      13 Oct 2020 06:26  Phos  3.1     10-13  Mg     2.3     10-13

## 2020-10-13 NOTE — PROGRESS NOTE BEHAVIORAL HEALTH - RISK ASSESSMENT
Chronic risk factors: single, male gender, continuous/chronic heavy alcohol abuse with limited insight and lack of motivation to address it. Protective factors: young; no formal psych hx; no known hx of suicide attempts or self-injurious behavior; no hx of aggression/violence; stable domicile, engaged with his job; denies illicit drug use; denies access to guns; access to health services. No acute risk factors identified

## 2020-10-13 NOTE — DISCHARGE NOTE PROVIDER - NSDCCPCAREPLAN_GEN_ALL_CORE_FT
PRINCIPAL DISCHARGE DIAGNOSIS  Diagnosis: Alcohol withdrawal  Assessment and Plan of Treatment:       SECONDARY DISCHARGE DIAGNOSES  Diagnosis: Dyslipidemia  Assessment and Plan of Treatment: Dyslipidemia    Diagnosis: Alcohol withdrawal syndrome with complication  Assessment and Plan of Treatment: Alcohol withdrawal syndrome with complication    Diagnosis: Gastritis  Assessment and Plan of Treatment:

## 2020-10-13 NOTE — DISCHARGE NOTE NURSING/CASE MANAGEMENT/SOCIAL WORK - PATIENT PORTAL LINK FT
You can access the FollowMyHealth Patient Portal offered by [facility] by registering at the following website: [email or web address]. By joining [facility] [employer]’s FollowMyHealth portal, you will also be able to view your health information using other applications (apps) compatible with our system.

## 2020-10-13 NOTE — PHYSICAL THERAPY INITIAL EVALUATION ADULT - TINETTI GAIT TEST, REHAB EVAL
Indication of gait -1/1,   Step Length and height -2/2,   Foot Clearance -2/2,   Step Symmetry - 1/1,  Step Continuity -1/1,   Path -2/ 2,   Trunk -1/2,   Walking Time -1/1,   Total Score - 11/12

## 2020-10-13 NOTE — PROGRESS NOTE BEHAVIORAL HEALTH - PRIMARY DX
Delirium due to multiple etiologies, acute, hyperactive
Delirium in remission
Delirium due to multiple etiologies, acute, hyperactive

## 2020-10-13 NOTE — PHYSICAL THERAPY INITIAL EVALUATION ADULT - CRITERIA FOR SKILLED THERAPEUTIC INTERVENTIONS
predicted duration of therapy intervention/impairments found/risk reduction/prevention/functional limitations in following categories/therapy frequency/anticipated discharge recommendation

## 2020-10-13 NOTE — PROGRESS NOTE BEHAVIORAL HEALTH - SUMMARY
acute delirium which is likely due to overuse of PRN benzos leading to AMS as clinical signs/symptoms do not support severe ETOH withdrawal / DTs  - presented with "intoxication" and not withdrawal. He was still intoxicated on the day of ED presentation. Severe withdrawal symptoms usually occur 2-3 days after last drink   - vitals have been stable thus far, no clinical signs of severe withdrawal sxs   - discontinued IVP PRN phenobarb ordered by Primary team as the perimeters and frequency was too high   - NO capacity to leave AMA/make his medical decisions
initial delirium was is likely due to overuse of PRN benzos leading to AMS as clinical signs/symptoms do not support severe ETOH withdrawal / DTs...AMS improved after IVP phenobarb was discontinued   - presented with "intoxication" and not withdrawal. He was still intoxicated on the day of ED presentation. Severe withdrawal symptoms usually occur 2-3 days after last drink   - vitals have been stable thus far, no clinical signs of severe withdrawal sxs   - MSE returned to baseline on 10/13/20 and Patient regained capacity
initial delirium was is likely due to overuse of PRN benzos leading to AMS as clinical signs/symptoms do not support severe ETOH withdrawal / DTs...AMS improved after IVP phenobarb was discontinued   - presented with "intoxication" and not withdrawal. He was still intoxicated on the day of ED presentation. Severe withdrawal symptoms usually occur 2-3 days after last drink   - vitals have been stable thus far, no clinical signs of severe withdrawal sxs   - NO capacity to leave AMA/make his medical decisions

## 2020-10-13 NOTE — DISCHARGE NOTE PROVIDER - HOSPITAL COURSE
55 M with HLD, GERD, EtOH, frequently admitted to [facility] for ETOH w/d (at least once monthly for years), alcoholic gastritis/esophagitis, PUD, HLD, hx of hypoxic respiratory failure requiring intubation due to aspiration PNA admitted 10/5 with ETOH intoxication and presented with epigastric pain and lactic acidosis (14.4). Course was complicated by worsening agitation and delirium. Patient is an unreliable historian, denies recent alcohol use.  Reported to triage a recent alcohol binge. For severe withdrawal, pt received ativan and phenobarbital. Pt with acute metabolic encephalopathy which has now resolved and pt is A+Ox3. Lactic acidosis has cleared. Education on ETOH cessation provided. Education on high risk of developing liver cirrhosis if patient does not quit ETOH and complications of liver cirrhosis discussed at length. Pt is now medically stable for discharge home with outpatient PT.

## 2020-10-13 NOTE — PROGRESS NOTE BEHAVIORAL HEALTH - OTHER
much improved deferred at this time "much better" returned to baseline - fair to low end of fair returned to baseline -  low end of fair

## 2020-10-13 NOTE — PROGRESS NOTE BEHAVIORAL HEALTH - SECONDARY DX1
Alcohol use disorder, severe, dependence

## 2020-10-13 NOTE — PROGRESS NOTE BEHAVIORAL HEALTH - NSBHCHARTREVIEWVS_PSY_A_CORE FT
T(C): 36.6 (10-13-20 @ 10:45), Max: 36.7 (10-12-20 @ 16:25)  HR: 77 (10-13-20 @ 10:45) (75 - 88)  BP: 124/75 (10-13-20 @ 10:45) (91/52 - 133/83)  RR: 17 (10-13-20 @ 10:45) (17 - 18)  SpO2: 97% (10-13-20 @ 10:45) (96% - 100%)

## 2020-10-13 NOTE — PHYSICAL THERAPY INITIAL EVALUATION ADULT - GENERAL OBSERVATIONS, REHAB EVAL
Pt was seen in supine, L arm c central line intact, alert and Ox4. Pt was comfortable and motivated.

## 2020-10-13 NOTE — DISCHARGE NOTE NURSING/CASE MANAGEMENT/SOCIAL WORK - NSDCVIVACCINE_GEN_ALL_CORE_FT
Influenza , 2019/1/31 15:53 , Ayaka Bynum (RN)  Influenza , 2019/11/10 14:13 , Laverne Lane (RN)  Influenza , 2020/10/13 11:56 , Kimberli Cronin (RN)  Td , 2020/1/18 20:04 , Yulia Vance (RN)

## 2020-10-19 DIAGNOSIS — T42.4X1A POISONING BY BENZODIAZEPINES, ACCIDENTAL (UNINTENTIONAL), INITIAL ENCOUNTER: ICD-10-CM

## 2020-10-19 DIAGNOSIS — T42.4X5A ADVERSE EFFECT OF BENZODIAZEPINES, INITIAL ENCOUNTER: ICD-10-CM

## 2020-10-19 DIAGNOSIS — G93.41 METABOLIC ENCEPHALOPATHY: ICD-10-CM

## 2020-10-19 DIAGNOSIS — F10.239 ALCOHOL DEPENDENCE WITH WITHDRAWAL, UNSPECIFIED: ICD-10-CM

## 2020-10-19 DIAGNOSIS — E83.39 OTHER DISORDERS OF PHOSPHORUS METABOLISM: ICD-10-CM

## 2020-10-19 DIAGNOSIS — E78.5 HYPERLIPIDEMIA, UNSPECIFIED: ICD-10-CM

## 2020-10-19 DIAGNOSIS — R78.0 FINDING OF ALCOHOL IN BLOOD: ICD-10-CM

## 2020-10-19 DIAGNOSIS — K29.20 ALCOHOLIC GASTRITIS WITHOUT BLEEDING: ICD-10-CM

## 2020-10-19 DIAGNOSIS — K70.10 ALCOHOLIC HEPATITIS WITHOUT ASCITES: ICD-10-CM

## 2020-10-19 DIAGNOSIS — F13.231 SEDATIVE, HYPNOTIC OR ANXIOLYTIC DEPENDENCE WITH WITHDRAWAL DELIRIUM: ICD-10-CM

## 2020-10-19 DIAGNOSIS — K76.0 FATTY (CHANGE OF) LIVER, NOT ELSEWHERE CLASSIFIED: ICD-10-CM

## 2020-10-19 DIAGNOSIS — F10.120 ALCOHOL ABUSE WITH INTOXICATION, UNCOMPLICATED: ICD-10-CM

## 2020-10-19 DIAGNOSIS — Z87.11 PERSONAL HISTORY OF PEPTIC ULCER DISEASE: ICD-10-CM

## 2020-10-19 DIAGNOSIS — F10.231 ALCOHOL DEPENDENCE WITH WITHDRAWAL DELIRIUM: ICD-10-CM
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
## 2020-10-19 DIAGNOSIS — E87.2 ACIDOSIS: ICD-10-CM

## 2020-10-19 DIAGNOSIS — R10.13 EPIGASTRIC PAIN: ICD-10-CM

## 2020-10-19 DIAGNOSIS — R45.1 RESTLESSNESS AND AGITATION: ICD-10-CM

## 2020-10-21 ENCOUNTER — INPATIENT (INPATIENT)
Facility: HOSPITAL | Age: 55
LOS: 4 days | Discharge: HOME | End: 2020-10-26
Attending: INTERNAL MEDICINE | Admitting: INTERNAL MEDICINE
Payer: MEDICAID

## 2020-10-21 VITALS
HEART RATE: 146 BPM | HEIGHT: 67 IN | SYSTOLIC BLOOD PRESSURE: 158 MMHG | TEMPERATURE: 98 F | RESPIRATION RATE: 17 BRPM | OXYGEN SATURATION: 98 % | DIASTOLIC BLOOD PRESSURE: 115 MMHG | WEIGHT: 145.06 LBS

## 2020-10-21 LAB
ALBUMIN SERPL ELPH-MCNC: 3.6 G/DL — SIGNIFICANT CHANGE UP (ref 3.3–5)
ALP SERPL-CCNC: 61 U/L — SIGNIFICANT CHANGE UP (ref 40–120)
ALT FLD-CCNC: 29 U/L — SIGNIFICANT CHANGE UP (ref 12–78)
AMYLASE P1 CFR SERPL: 129 U/L — HIGH (ref 25–115)
ANION GAP SERPL CALC-SCNC: 17 MMOL/L — SIGNIFICANT CHANGE UP (ref 5–17)
APTT BLD: 28.2 SEC — SIGNIFICANT CHANGE UP (ref 27.5–35.5)
AST SERPL-CCNC: 41 U/L — HIGH (ref 15–37)
BASOPHILS # BLD AUTO: 0.09 K/UL — SIGNIFICANT CHANGE UP (ref 0–0.2)
BASOPHILS NFR BLD AUTO: 1.8 % — SIGNIFICANT CHANGE UP (ref 0–2)
BILIRUB SERPL-MCNC: 0.2 MG/DL — SIGNIFICANT CHANGE UP (ref 0.2–1.2)
BLD GP AB SCN SERPL QL: SIGNIFICANT CHANGE UP
BUN SERPL-MCNC: 11 MG/DL — SIGNIFICANT CHANGE UP (ref 7–23)
CALCIUM SERPL-MCNC: 8.4 MG/DL — LOW (ref 8.5–10.1)
CHLORIDE SERPL-SCNC: 101 MMOL/L — SIGNIFICANT CHANGE UP (ref 96–108)
CO2 SERPL-SCNC: 22 MMOL/L — SIGNIFICANT CHANGE UP (ref 22–31)
CREAT SERPL-MCNC: 1.09 MG/DL — SIGNIFICANT CHANGE UP (ref 0.5–1.3)
EOSINOPHIL # BLD AUTO: 0.06 K/UL — SIGNIFICANT CHANGE UP (ref 0–0.5)
EOSINOPHIL NFR BLD AUTO: 1.2 % — SIGNIFICANT CHANGE UP (ref 0–6)
ETHANOL SERPL-MCNC: 324 MG/DL — HIGH (ref 0–10)
GLUCOSE SERPL-MCNC: 88 MG/DL — SIGNIFICANT CHANGE UP (ref 70–99)
HCT VFR BLD CALC: 39.9 % — SIGNIFICANT CHANGE UP (ref 39–50)
HGB BLD-MCNC: 13.4 G/DL — SIGNIFICANT CHANGE UP (ref 13–17)
IMM GRANULOCYTES NFR BLD AUTO: 0.2 % — SIGNIFICANT CHANGE UP (ref 0–1.5)
INR BLD: 1 RATIO — SIGNIFICANT CHANGE UP (ref 0.88–1.16)
LACTATE SERPL-SCNC: 6.3 MMOL/L — CRITICAL HIGH (ref 0.7–2)
LIDOCAIN IGE QN: 265 U/L — SIGNIFICANT CHANGE UP (ref 73–393)
LYMPHOCYTES # BLD AUTO: 2.27 K/UL — SIGNIFICANT CHANGE UP (ref 1–3.3)
LYMPHOCYTES # BLD AUTO: 46.6 % — HIGH (ref 13–44)
MAGNESIUM SERPL-MCNC: 2 MG/DL — SIGNIFICANT CHANGE UP (ref 1.6–2.6)
MCHC RBC-ENTMCNC: 27.9 PG — SIGNIFICANT CHANGE UP (ref 27–34)
MCHC RBC-ENTMCNC: 33.6 GM/DL — SIGNIFICANT CHANGE UP (ref 32–36)
MCV RBC AUTO: 83.1 FL — SIGNIFICANT CHANGE UP (ref 80–100)
MONOCYTES # BLD AUTO: 0.21 K/UL — SIGNIFICANT CHANGE UP (ref 0–0.9)
MONOCYTES NFR BLD AUTO: 4.3 % — SIGNIFICANT CHANGE UP (ref 2–14)
NEUTROPHILS # BLD AUTO: 2.23 K/UL — SIGNIFICANT CHANGE UP (ref 1.8–7.4)
NEUTROPHILS NFR BLD AUTO: 45.9 % — SIGNIFICANT CHANGE UP (ref 43–77)
NRBC # BLD: 0 /100 WBCS — SIGNIFICANT CHANGE UP (ref 0–0)
PLATELET # BLD AUTO: 413 K/UL — HIGH (ref 150–400)
POTASSIUM SERPL-MCNC: 3.6 MMOL/L — SIGNIFICANT CHANGE UP (ref 3.5–5.3)
POTASSIUM SERPL-SCNC: 3.6 MMOL/L — SIGNIFICANT CHANGE UP (ref 3.5–5.3)
PROT SERPL-MCNC: 8.5 GM/DL — HIGH (ref 6–8.3)
PROTHROM AB SERPL-ACNC: 11.6 SEC — SIGNIFICANT CHANGE UP (ref 10.6–13.6)
RBC # BLD: 4.8 M/UL — SIGNIFICANT CHANGE UP (ref 4.2–5.8)
RBC # FLD: 14.9 % — HIGH (ref 10.3–14.5)
SARS-COV-2 RNA SPEC QL NAA+PROBE: SIGNIFICANT CHANGE UP
SODIUM SERPL-SCNC: 140 MMOL/L — SIGNIFICANT CHANGE UP (ref 135–145)
WBC # BLD: 4.87 K/UL — SIGNIFICANT CHANGE UP (ref 3.8–10.5)
WBC # FLD AUTO: 4.87 K/UL — SIGNIFICANT CHANGE UP (ref 3.8–10.5)

## 2020-10-21 PROCEDURE — 93010 ELECTROCARDIOGRAM REPORT: CPT

## 2020-10-21 PROCEDURE — 99285 EMERGENCY DEPT VISIT HI MDM: CPT

## 2020-10-21 PROCEDURE — 99222 1ST HOSP IP/OBS MODERATE 55: CPT

## 2020-10-21 PROCEDURE — 71045 X-RAY EXAM CHEST 1 VIEW: CPT | Mod: 26

## 2020-10-21 PROCEDURE — 74176 CT ABD & PELVIS W/O CONTRAST: CPT | Mod: 26,MA

## 2020-10-21 RX ORDER — FOLIC ACID 0.8 MG
1 TABLET ORAL ONCE
Refills: 0 | Status: COMPLETED | OUTPATIENT
Start: 2020-10-21 | End: 2020-10-21

## 2020-10-21 RX ORDER — MORPHINE SULFATE 50 MG/1
4 CAPSULE, EXTENDED RELEASE ORAL ONCE
Refills: 0 | Status: DISCONTINUED | OUTPATIENT
Start: 2020-10-21 | End: 2020-10-21

## 2020-10-21 RX ORDER — THIAMINE MONONITRATE (VIT B1) 100 MG
100 TABLET ORAL ONCE
Refills: 0 | Status: COMPLETED | OUTPATIENT
Start: 2020-10-21 | End: 2020-10-21

## 2020-10-21 RX ORDER — SODIUM CHLORIDE 9 MG/ML
2000 INJECTION INTRAMUSCULAR; INTRAVENOUS; SUBCUTANEOUS ONCE
Refills: 0 | Status: COMPLETED | OUTPATIENT
Start: 2020-10-21 | End: 2020-10-21

## 2020-10-21 RX ORDER — ONDANSETRON 8 MG/1
4 TABLET, FILM COATED ORAL ONCE
Refills: 0 | Status: COMPLETED | OUTPATIENT
Start: 2020-10-21 | End: 2020-10-21

## 2020-10-21 RX ORDER — PANTOPRAZOLE SODIUM 20 MG/1
8 TABLET, DELAYED RELEASE ORAL
Qty: 80 | Refills: 0 | Status: DISCONTINUED | OUTPATIENT
Start: 2020-10-21 | End: 2020-10-22

## 2020-10-21 RX ORDER — METOCLOPRAMIDE HCL 10 MG
10 TABLET ORAL ONCE
Refills: 0 | Status: COMPLETED | OUTPATIENT
Start: 2020-10-21 | End: 2020-10-21

## 2020-10-21 RX ADMIN — Medication 1 MILLIGRAM(S): at 20:48

## 2020-10-21 RX ADMIN — MORPHINE SULFATE 4 MILLIGRAM(S): 50 CAPSULE, EXTENDED RELEASE ORAL at 21:12

## 2020-10-21 RX ADMIN — SODIUM CHLORIDE 1000 MILLILITER(S): 9 INJECTION INTRAMUSCULAR; INTRAVENOUS; SUBCUTANEOUS at 20:47

## 2020-10-21 RX ADMIN — Medication 10 MILLIGRAM(S): at 21:49

## 2020-10-21 RX ADMIN — PANTOPRAZOLE SODIUM 10 MG/HR: 20 TABLET, DELAYED RELEASE ORAL at 21:50

## 2020-10-21 RX ADMIN — Medication 100 MILLIGRAM(S): at 20:48

## 2020-10-21 RX ADMIN — SODIUM CHLORIDE 2000 MILLILITER(S): 9 INJECTION INTRAMUSCULAR; INTRAVENOUS; SUBCUTANEOUS at 21:30

## 2020-10-21 RX ADMIN — Medication 2 MILLIGRAM(S): at 20:48

## 2020-10-21 RX ADMIN — Medication 1 TABLET(S): at 20:48

## 2020-10-21 RX ADMIN — ONDANSETRON 4 MILLIGRAM(S): 8 TABLET, FILM COATED ORAL at 20:48

## 2020-10-21 NOTE — ED ADULT NURSE NOTE - NS ED NURSE DISCH DISPOSITION
Jeannette Sterling  : 1951  [place]  Secondary[facility] [address], [place] [address]  [phone]   [phone]        OUTPATIENT PHYSICAL THERAPY: Daily Treatment Note 2019     ICD-10: Treatment Diagnosis: Pain in left shoulder (M25.512); Other sprain of left shoulder joint, sequela (H15.890D)  Precautions/Allergies:   Codeine; Cenestin [synthetic conj estrogens a]; Doxycycline; Levaquin [levofloxacin]; Pcn [penicillins]; and Sulfa dyne   MD Orders: evaluate and treat MEDICAL/REFERRING DIAGNOSIS:  Pain in left shoulder [M25.512]   DATE OF ONSET: 2018  REFERRING PHYSICIAN: Leander Davis MD  RETURN PHYSICIAN APPOINTMENT: 6-10-19       Pre-treatment Symptoms/Complaints:  Patient reports she is doing well today, and has no specific issues stated at the shoulder. Feels independent with her rehab and is ready for discharge from formal PT at this time. Pain: Initial: Pain Intensity 1: 2  Pain Location 1: Shoulder  Pain Orientation 1: Left  Post Session:  2/10   Medications Last Reviewed:  2019    Updated Objective Findings:  See discharge note from today   TREATMENT:     THERAPEUTIC EXERCISE: (30 minutes):  Exercises per grid below to improve mobility and strength. Required minimal verbal cues to promote proper body alignment, promote proper body posture and promote proper body mechanics. Progressed complexity of movement as indicated. MANUAL THERAPY: (10 minutes): Joint mobilization and Soft tissue mobilization was utilized and necessary because of the patient's restricted joint motion, painful spasm, loss of articular motion and restricted motion of soft tissue.    Patient received standing AAROM in FE and abduction, x10 each with 5 second holds at end range of motion, clinician providing over-pressure at end range of active movement     Date:  19 Date:  5/29/19 Date  6-10-19 Date  6-18-19 Date  6-25-19   Activity/Exercise Parameters Parameters      UBE 4/4 1.0  1/1 2.0  3/3  Level 1 4/4 - 8 minutes  Level 1 3/3  Level 1   Finger ladder      Forward and abduction   x5 each  Forward and abduction   x5 each    Table slide flexion  x10 swiss ball on mat table to tolerance x10 swiss ball on mat table to tolerance      Wand AAROM x10 abduction in stand       Pulley FE, abduction, IR to tolerance x10  FE, abduction, IR to tolerance x10  FE, abduction, IR to tolerance x10  FE to tolerance  x20  scaption to tolerance  x20   Rows RTB 2x10 RTB  2x10 GTB  2x10 7 lb cables  2x10 7 lb cables  2x10   Ms RTB 2x10  RTB 2x10  GTB  2x10     IR/ER  RTB 2x10 each   3 lb cables  2x10 ea   Scaption  1# 2x10 B        MODALITIES: (5 mins) moist heat to L upper trap    Treatment/Session Summary:    · Response to Treatment:  Pt is reaching near full PROM in all planes, and AAROM is also steadily improving.    · Communication/Consultation:  Continue HEP  · Equipment provided today:  None today    Treatment Plan of Care Effective Dates:  5/6/2019 TO 6/25/2019   Total Treatment Billable Duration: 40 minutes - 30 mins therapeutic exercise and 10 minutes manual therapy  PT Patient Time In/Time Out  Time In: 0730  Time Out: 0815  Patsy Chu PT, DPT    Future Appointments   Date Time Provider Ayla Wills   7/1/2019  7:30 AM Glen Gonzalez PT, DPT [facility]   7/2/2019  7:30 AM Mercy Hubbard PT [facility]   7/5/2019  1:45 PM Colton Manzano DO [facility] DG D   7/8/2019  7:30 AM Glen Gonzalez PT, DPT [facility]   7/8/2019  9:50 AM [facility]   7/9/2019  7:30 AM [facility]   7/15/2019  8:30 AM Aura Valentin DO BSUG BSUG   10/21/2019  8:15 AM Edilia Gonzales MD [facility]   10/30/2019  8:30 AM LAURENCE ECHO 26 [facility] UCDG UCD   11/5/2019  9:00 AM Colton Manzano DO [facility] UCDG UCD Admitted

## 2020-10-21 NOTE — ED PROVIDER NOTE - PHYSICAL EXAMINATION
Gen: Alert, distressed, ill appearing  Head: NC, AT, EOMI, normal lids/conjunctiva  ENT: normal hearing, patent oropharynx without erythema/exudate, uvula midline, dry mucosa  Neck: +supple, +Trachea midline  Pulm: Bilateral BS, normal resp effort, no wheeze/stridor/retractions  CV: tachycardia, no M/R/G, +dist pulses  Abd: soft, +epigastric ttp, ND, +BS, no palpable masses  Mskel: no edema/erythema/cyanosis  Skin: no rash, warm/dry  Neuro: AAOx3, no apparent sensory/motor deficits, coordination intact

## 2020-10-21 NOTE — ED PROVIDER NOTE - OBJECTIVE STATEMENT
Triage Nurse Note: "c/o vomitting he etoh"  Pertinent PMH/PSH/FHx/SHx and Review of Systems contained within:   Pt is a 55 year old male w/PMH of delirium tremens, PUD, mixed HLD, EtOH abuse presents to the ED today for vomiting. Pt states he has been having vomiting since yesterday w/blood on occasion. States his last drink was x3 days ago. Denies fever/chills, cough, focal pain, diarrhea, CP or SOB. C/o pain all over. Pt is slightly tremulous. Pt is non smoker, denies other drug use. Pt has had multiple admissions for EtOH related illness in past. Triage Nurse Note: "c/o vomitting he etoh"  Pertinent PMH/PSH/FHx/SHx and Review of Systems contained within:   Pt is a 55 year old male w/PMH of delirium tremens, PUD, mixed HLD, EtOH abuse presents to the ED today for vomiting. Pt states he has been having vomiting since yesterday w/blood and coffee grinds on occasion. States his last drink was x3 days ago. Denies fever/chills, cough, focal pain, diarrhea, CP or SOB. C/o pain all over. Pt is tremulous and vomiting. Pt is non smoker, denies other drug use. Pt has had multiple admissions for EtOH related illness in past.

## 2020-10-21 NOTE — ED ADULT TRIAGE NOTE - NS ED NURSE BANDS TYPE
Symptoms of Retinal tear and detachment reviewed. Patient understands to call immediately with any such symptoms. Name band;

## 2020-10-21 NOTE — ED PROVIDER NOTE - CLINICAL SUMMARY MEDICAL DECISION MAKING FREE TEXT BOX
Patient presents again for epigastric pain, vomiting, coffee ground emesis, elevated etoh, patient reports he has been withdrawing.  Currently improved with GI, withdrawal meds.  Patient is to be admitted to the hospital and the case was discussed with the admitting physician.  Any changes in plan, additional imaging/labs, and further work up will be at the discretion of the admitting physician. Per discussion with admitting physician, all necessary consults for admitted patients to be obtained by morning team unless patient requires emergent intervention or medical advice by specialist overnight.

## 2020-10-21 NOTE — ED ADULT NURSE NOTE - OBJECTIVE STATEMENT
Pt with a hx that includes Gastritis, ETOH/substance  abuse, HLD, PUD, and DTs brought by family to the ED with a c/o nausea and vomiting (1 episode with blood) that started 2 days ago. Pt's last drink was 3 days ago and denies fever, chest pain, SOB, /neurological complaints or any other complaint.

## 2020-10-21 NOTE — ED PROVIDER NOTE - NS ED ROS FT
No fever/chills, No photophobia/eye pain/changes in vision, No ear pain/sore throat/dysphagia, No chest pain/palpitations, no SOB/cough/wheeze/stridor, No abdominal pain, +Vomit, No N/D, no dysuria/frequency/discharge, No neck/back pain, no rash, no changes in neurological status/function. No fever/chills, No photophobia/eye pain/changes in vision, No ear pain/sore throat/dysphagia, No chest pain/palpitations, no SOB/cough/wheeze/stridor, +Vomit, No N/D, no dysuria/frequency/discharge, no rash, no changes in neurological status/function.

## 2020-10-21 NOTE — ED PROVIDER NOTE - CARE PLAN
Principal Discharge DX:	PUD (peptic ulcer disease)  Secondary Diagnosis:	Alcohol poisoning  Secondary Diagnosis:	Alcohol withdrawal syndrome without complication

## 2020-10-22 DIAGNOSIS — Z29.9 ENCOUNTER FOR PROPHYLACTIC MEASURES, UNSPECIFIED: ICD-10-CM

## 2020-10-22 DIAGNOSIS — E87.2 ACIDOSIS: ICD-10-CM

## 2020-10-22 DIAGNOSIS — E78.5 HYPERLIPIDEMIA, UNSPECIFIED: ICD-10-CM

## 2020-10-22 DIAGNOSIS — F10.230 ALCOHOL DEPENDENCE WITH WITHDRAWAL, UNCOMPLICATED: ICD-10-CM

## 2020-10-22 DIAGNOSIS — K29.20 ALCOHOLIC GASTRITIS WITHOUT BLEEDING: ICD-10-CM

## 2020-10-22 LAB
LACTATE SERPL-SCNC: 3 MMOL/L — HIGH (ref 0.7–2)
LACTATE SERPL-SCNC: 3.3 MMOL/L — HIGH (ref 0.7–2)
LACTATE SERPL-SCNC: 5.6 MMOL/L — CRITICAL HIGH (ref 0.7–2)
SARS-COV-2 IGG SERPL QL IA: NEGATIVE — SIGNIFICANT CHANGE UP
SARS-COV-2 IGM SERPL IA-ACNC: <0.1 INDEX — SIGNIFICANT CHANGE UP

## 2020-10-22 PROCEDURE — 99233 SBSQ HOSP IP/OBS HIGH 50: CPT

## 2020-10-22 RX ORDER — THIAMINE MONONITRATE (VIT B1) 100 MG
100 TABLET ORAL DAILY
Refills: 0 | Status: DISCONTINUED | OUTPATIENT
Start: 2020-10-22 | End: 2020-10-26

## 2020-10-22 RX ORDER — HEPARIN SODIUM 5000 [USP'U]/ML
5000 INJECTION INTRAVENOUS; SUBCUTANEOUS EVERY 12 HOURS
Refills: 0 | Status: DISCONTINUED | OUTPATIENT
Start: 2020-10-22 | End: 2020-10-26

## 2020-10-22 RX ORDER — ATORVASTATIN CALCIUM 80 MG/1
20 TABLET, FILM COATED ORAL AT BEDTIME
Refills: 0 | Status: DISCONTINUED | OUTPATIENT
Start: 2020-10-22 | End: 2020-10-26

## 2020-10-22 RX ORDER — PANTOPRAZOLE SODIUM 20 MG/1
40 TABLET, DELAYED RELEASE ORAL
Refills: 0 | Status: DISCONTINUED | OUTPATIENT
Start: 2020-10-22 | End: 2020-10-26

## 2020-10-22 RX ORDER — FOLIC ACID 0.8 MG
1 TABLET ORAL DAILY
Refills: 0 | Status: DISCONTINUED | OUTPATIENT
Start: 2020-10-22 | End: 2020-10-26

## 2020-10-22 RX ORDER — PHENOBARBITAL 60 MG
260 TABLET ORAL
Refills: 0 | Status: DISCONTINUED | OUTPATIENT
Start: 2020-10-22 | End: 2020-10-22

## 2020-10-22 RX ADMIN — Medication 1 MILLIGRAM(S): at 11:04

## 2020-10-22 RX ADMIN — Medication 100 MILLIGRAM(S): at 11:04

## 2020-10-22 RX ADMIN — PANTOPRAZOLE SODIUM 10 MG/HR: 20 TABLET, DELAYED RELEASE ORAL at 07:44

## 2020-10-22 RX ADMIN — Medication 2 MILLIGRAM(S): at 22:42

## 2020-10-22 RX ADMIN — Medication 50 MILLIGRAM(S): at 23:29

## 2020-10-22 RX ADMIN — Medication 260 MILLIGRAM(S): at 05:01

## 2020-10-22 RX ADMIN — Medication 50 MILLIGRAM(S): at 17:00

## 2020-10-22 RX ADMIN — HEPARIN SODIUM 5000 UNIT(S): 5000 INJECTION INTRAVENOUS; SUBCUTANEOUS at 17:00

## 2020-10-22 RX ADMIN — PANTOPRAZOLE SODIUM 40 MILLIGRAM(S): 20 TABLET, DELAYED RELEASE ORAL at 17:00

## 2020-10-22 RX ADMIN — Medication 1 TABLET(S): at 11:04

## 2020-10-22 RX ADMIN — Medication 4 MILLIGRAM(S): at 05:30

## 2020-10-22 RX ADMIN — HEPARIN SODIUM 5000 UNIT(S): 5000 INJECTION INTRAVENOUS; SUBCUTANEOUS at 05:01

## 2020-10-22 RX ADMIN — Medication 260 MILLIGRAM(S): at 02:37

## 2020-10-22 RX ADMIN — ATORVASTATIN CALCIUM 20 MILLIGRAM(S): 80 TABLET, FILM COATED ORAL at 21:23

## 2020-10-22 RX ADMIN — Medication 4 MILLIGRAM(S): at 11:05

## 2020-10-22 NOTE — H&P ADULT - PROBLEM SELECTOR PLAN 2
CIWA currently 0, recently received ativan  Will continue withdrawal protocol with ativan 4mg IVP q6 standing.  Phenobarbital for breakthrough withdrawal  Patient is a HIGH RISK for EtOH withdrawal, notably requiring ICU care last month.  Slow taper of benzos.    Thiamine, Folate, MVI daily.

## 2020-10-22 NOTE — PATIENT PROFILE ADULT - NSPROPTRIGHTSUPPORTPERSON_GEN_A_NUR
----- Message from Hoang Irving NP sent at 10/27/2017  3:28 PM CDT -----  EGD:  Gastritis with focal intestinal metaplasia. Intestinal cells in the stomach.   Continue antacid medication as prescribed    If symptoms persist, follow up   Egd in 3 yrs to follow up   Continue ppi  
Letters sent.  
Reminder, send patient letter.    Pt history updated.  Pt reminder set up done.  
declines

## 2020-10-22 NOTE — H&P ADULT - NSHPREVIEWOFSYSTEMS_GEN_ALL_CORE
Constitutional: no fever, chills, night sweats  Ears: no hearing changes or ear pain,   Nose: no nasal congestion, sinus pain, or rhinorrhea  Cardio: no chest pain, orthopnea, edema, or palpitations  Resp: no dyspnea, cough, wheezing  GI: nausea, vomiting, abdominal pain positive.  No diarrhea, constipation, hematochezia, or melena  : no dysuria, urinary frequency, hematuria  MSK: no back pain, neck pain  Skin: no rash, pruritis   Neuro: no weakness, dizziness, lightheadedness, syncope   Heme/Lymph: no bruising or bleeding

## 2020-10-22 NOTE — H&P ADULT - NSHPLABSRESULTS_GEN_ALL_CORE
Recent Vitals  T(C): 36.7 (10-21-20 @ 23:33), Max: 36.8 (10-21-20 @ 20:25)  HR: 90 (10-21-20 @ 23:33) (88 - 146)  BP: 119/81 (10-21-20 @ 23:33) (117/76 - 158/115)  RR: 18 (10-21-20 @ 23:33) (15 - 20)  SpO2: 96% (10-21-20 @ 23:33) (94% - 98%)                        13.4   4.87  )-----------( 413      ( 21 Oct 2020 20:41 )             39.9     10-21    140  |  101  |  11  ----------------------------<  88  3.6   |  22  |  1.09    Ca    8.4<L>      21 Oct 2020 20:41  Mg     2.0     10-21    TPro  8.5<H>  /  Alb  3.6  /  TBili  0.2  /  DBili  x   /  AST  41<H>  /  ALT  29  /  AlkPhos  61  10-21    PT/INR - ( 21 Oct 2020 20:41 )   PT: 11.6 sec;   INR: 1.00 ratio         PTT - ( 21 Oct 2020 20:41 )  PTT:28.2 sec  LIVER FUNCTIONS - ( 21 Oct 2020 20:41 )  Alb: 3.6 g/dL / Pro: 8.5 gm/dL / ALK PHOS: 61 U/L / ALT: 29 U/L / AST: 41 U/L / GGT: x               Home Medications:

## 2020-10-22 NOTE — H&P ADULT - HISTORY OF PRESENT ILLNESS
Patient is a 55M with a PMH of chronic ETOH abuse with hx of alcohol withdrawal with frequent hospital admissions, alcoholic gastritis/esophagitis, PUD, HLD, hx of hypoxic respiratory failure requiring intubation due to aspiration PNA who presents to the ED for abdominal pain.  Patient is an unreliable historian, denies recent alcohol use.  Patient complains of multiple episodes of nausea and NBNB vomiting.  Patient complains of epigastric and abdominal pain.  No other complaints.    Labs show significant lactic acidosis.  Will admit to tele.      alc  lac  ppi  preve Patient is a 55M with a PMH of chronic ETOH abuse with hx of alcohol withdrawal with frequent hospital admissions, alcoholic gastritis/esophagitis, PUD, HLD, hx of hypoxic respiratory failure requiring intubation due to aspiration PNA who presents to the ED for abdominal pain.  Patient is an unreliable historian, denies recent alcohol use.  Patient complains of multiple episodes of nausea and NBNB vomiting.  Patient complains of epigastric and abdominal pain.  No other complaints.    Labs show significant lactic acidosis.  Will admit to tele.

## 2020-10-22 NOTE — PROGRESS NOTE ADULT - ASSESSMENT
55M with a PMH of chronic ETOH abuse with hx of alcohol withdrawal with frequent hospital admissions, alcoholic gastritis/esophagitis, PUD, HLD, presented with abdominal pain, nausea, and vomiting.        ·  Problem: Chronic alcoholic gastritis without hemorrhage.  Plan: change to po PPI.  No s/s of bleeding.  ·  Problem: Alcohol withdrawal syndrome without complication.  Pt with extensive h/o of significant withdrawal.  Observe on CIWA protocol.  Start on Librium 50mg q6h.  Ativan prn.  Counselled on cessation.   ·  Problem: Lactic acid acidosis.  Plan: Lactate trending down.  Monitor labs.   ·  Problem: Dyslipidemia.  Plan: simvastatin.   ·  Problem: Preventive measure.  Plan: VTE prop: heparin sc q12 hrs.

## 2020-10-22 NOTE — PROGRESS NOTE ADULT - SUBJECTIVE AND OBJECTIVE BOX
Patient: VANDANA VILLA [id number] 55y Male                           Internal Medicine Hospitalist Progress Note    Seen with RN.  CECILIA score ~ 2.  No complaints.  No Abdominal pain, nausea, vomiting.  No bleeding.      ____________________PHYSICAL EXAM:  GENERAL:  NAD Alert and Oriented x 3   HEENT: NCAT  CARDIOVASCULAR:  S1, S2  LUNGS: CTAB  ABDOMEN:  soft, (-) tenderness, (-) distension, (+) bowel sounds, (-) guarding, (-) rebound (-) rigidity  EXTREMITIES:  no cyanosis / clubbing / edema.   ____________________     VITALS:  Vital Signs Last 24 Hrs  T(C): 36.8 (22 Oct 2020 10:52), Max: 37.2 (22 Oct 2020 01:07)  T(F): 98.3 (22 Oct 2020 10:52), Max: 99 (22 Oct 2020 01:07)  HR: 93 (22 Oct 2020 10:52) (86 - 146)  BP: 141/78 (22 Oct 2020 10:52) (117/76 - 158/115)  BP(mean): --  RR: 18 (22 Oct 2020 10:52) (15 - 20)  SpO2: 97% (22 Oct 2020 10:52) (94% - 98%) Daily Height in cm: 170.18 (21 Oct 2020 19:13)    Daily Weight in k.2 (22 Oct 2020 05:10)  CAPILLARY BLOOD GLUCOSE        I&O's Summary    21 Oct 2020 07:  -  22 Oct 2020 07:00  --------------------------------------------------------  IN: 0 mL / OUT: 900 mL / NET: -900 mL    22 Oct 2020 07:  -  22 Oct 2020 13:39  --------------------------------------------------------  IN: 360 mL / OUT: 0 mL / NET: 360 mL          BACKGROUND:  HEALTH ISSUES - PROBLEM Dx:  Preventive measure  Preventive measure    Dyslipidemia  Dyslipidemia    Lactic acid acidosis  Lactic acid acidosis    Alcohol withdrawal syndrome without complication  Alcohol withdrawal syndrome without complication    Chronic alcoholic gastritis without hemorrhage  Chronic alcoholic gastritis without hemorrhage          Allergies    No Known Allergies    Intolerances      PAST MEDICAL & SURGICAL HISTORY:  DTs (delirium tremens)    Substance abuse    Gastritis    EtOH dependence    Mixed hyperlipidemia    PUD (peptic ulcer disease)    No significant past surgical history          LABS:                        13.4   4.87  )-----------( 413      ( 21 Oct 2020 20:41 )             39.9     10-    140  |  101  |  11  ----------------------------<  88  3.6   |  22  |  1.09    Ca    8.4<L>      21 Oct 2020 20:41  Mg     2.0     10-21    TPro  8.5<H>  /  Alb  3.6  /  TBili  0.2  /  DBili  x   /  AST  41<H>  /  ALT  29  /  AlkPhos  61  10-    PT/INR - ( 21 Oct 2020 20:41 )   PT: 11.6 sec;   INR: 1.00 ratio         PTT - ( 21 Oct 2020 20:41 )  PTT:28.2 sec  LIVER FUNCTIONS - ( 21 Oct 2020 20:41 )  Alb: 3.6 g/dL / Pro: 8.5 gm/dL / ALK PHOS: 61 U/L / ALT: 29 U/L / AST: 41 U/L / GGT: x                     MEDICATIONS:  MEDICATIONS  (STANDING):  atorvastatin 20 milliGRAM(s) Oral at bedtime  chlordiazePOXIDE 50 milliGRAM(s) Oral every 6 hours  folic acid 1 milliGRAM(s) Oral daily  heparin   Injectable 5000 Unit(s) SubCutaneous every 12 hours  multivitamin 1 Tablet(s) Oral daily  pantoprazole    Tablet 40 milliGRAM(s) Oral two times a day  thiamine 100 milliGRAM(s) Oral daily    MEDICATIONS  (PRN):  LORazepam   Injectable 2 milliGRAM(s) IV Push every 1 hour PRN Symptom-triggered: each CIWA -Ar score 8 or GREATER

## 2020-10-22 NOTE — H&P ADULT - ASSESSMENT
Patient is a 55M with a PMH of chronic ETOH abuse with hx of alcohol withdrawal with frequent hospital admissions, alcoholic gastritis/esophagitis, PUD, HLD, hx of hypoxic respiratory failure requiring intubation due to aspiration PNA who presents to the ED for abdominal pain.  Patient is an unreliable historian, denies recent alcohol use.  Patient complains of multiple episodes of nausea and NBNB vomiting.  Patient complains of epigastric and abdominal pain.  No other complaints.    Labs show significant lactic acidosis.  Will admit to tele.

## 2020-10-23 PROCEDURE — 99233 SBSQ HOSP IP/OBS HIGH 50: CPT

## 2020-10-23 RX ORDER — OXYCODONE HYDROCHLORIDE 5 MG/1
5 TABLET ORAL ONCE
Refills: 0 | Status: DISCONTINUED | OUTPATIENT
Start: 2020-10-23 | End: 2020-10-23

## 2020-10-23 RX ORDER — SODIUM CHLORIDE 9 MG/ML
1000 INJECTION INTRAMUSCULAR; INTRAVENOUS; SUBCUTANEOUS
Refills: 0 | Status: DISCONTINUED | OUTPATIENT
Start: 2020-10-23 | End: 2020-10-26

## 2020-10-23 RX ADMIN — OXYCODONE HYDROCHLORIDE 5 MILLIGRAM(S): 5 TABLET ORAL at 08:02

## 2020-10-23 RX ADMIN — SODIUM CHLORIDE 125 MILLILITER(S): 9 INJECTION INTRAMUSCULAR; INTRAVENOUS; SUBCUTANEOUS at 22:29

## 2020-10-23 RX ADMIN — SODIUM CHLORIDE 125 MILLILITER(S): 9 INJECTION INTRAMUSCULAR; INTRAVENOUS; SUBCUTANEOUS at 11:29

## 2020-10-23 RX ADMIN — Medication 100 MILLIGRAM(S): at 11:29

## 2020-10-23 RX ADMIN — Medication 2 MILLIGRAM(S): at 22:14

## 2020-10-23 RX ADMIN — Medication 50 MILLIGRAM(S): at 23:40

## 2020-10-23 RX ADMIN — Medication 2 MILLIGRAM(S): at 14:01

## 2020-10-23 RX ADMIN — OXYCODONE HYDROCHLORIDE 5 MILLIGRAM(S): 5 TABLET ORAL at 09:00

## 2020-10-23 RX ADMIN — Medication 50 MILLIGRAM(S): at 11:28

## 2020-10-23 RX ADMIN — PANTOPRAZOLE SODIUM 40 MILLIGRAM(S): 20 TABLET, DELAYED RELEASE ORAL at 18:23

## 2020-10-23 RX ADMIN — Medication 50 MILLIGRAM(S): at 05:22

## 2020-10-23 RX ADMIN — HEPARIN SODIUM 5000 UNIT(S): 5000 INJECTION INTRAVENOUS; SUBCUTANEOUS at 18:23

## 2020-10-23 RX ADMIN — PANTOPRAZOLE SODIUM 40 MILLIGRAM(S): 20 TABLET, DELAYED RELEASE ORAL at 05:20

## 2020-10-23 RX ADMIN — ATORVASTATIN CALCIUM 20 MILLIGRAM(S): 80 TABLET, FILM COATED ORAL at 22:08

## 2020-10-23 RX ADMIN — HEPARIN SODIUM 5000 UNIT(S): 5000 INJECTION INTRAVENOUS; SUBCUTANEOUS at 05:20

## 2020-10-23 RX ADMIN — Medication 1 TABLET(S): at 11:28

## 2020-10-23 RX ADMIN — Medication 1 MILLIGRAM(S): at 11:29

## 2020-10-23 RX ADMIN — Medication 50 MILLIGRAM(S): at 18:23

## 2020-10-23 NOTE — PROGRESS NOTE ADULT - SUBJECTIVE AND OBJECTIVE BOX
Patient: VANDANA VILLA [id number] 55y Male                           Internal Medicine Hospitalist Progress Note    C/o mild lower abdominal pain.  No nv/d/c.  CIWA score 2-5.   ____________________PHYSICAL EXAM:  GENERAL:  NAD Alert and Oriented x 3   HEENT: NCAT  CARDIOVASCULAR:  S1, S2  LUNGS: CTAB  ABDOMEN:  soft, (-) tenderness, (-) distension, (+) bowel sounds, (-) guarding, (-) rebound (-) rigidity  EXTREMITIES:  no cyanosis / clubbing / edema.   ____________________    VITALS:  Vital Signs Last 24 Hrs  T(C): 36.5 (23 Oct 2020 10:25), Max: 36.8 (22 Oct 2020 16:12)  T(F): 97.7 (23 Oct 2020 10:25), Max: 98.3 (22 Oct 2020 23:35)  HR: 80 (23 Oct 2020 10:25) (68 - 94)  BP: 101/69 (23 Oct 2020 10:25) (101/69 - 129/75)  BP(mean): --  RR: 17 (23 Oct 2020 10:25) (16 - 20)  SpO2: 96% (23 Oct 2020 10:25) (96% - 98%) Daily     Daily Weight in k.8 (23 Oct 2020 05:18)  CAPILLARY BLOOD GLUCOSE        I&O's Summary    22 Oct 2020 07:01  -  23 Oct 2020 07:00  --------------------------------------------------------  IN: 360 mL / OUT: 0 mL / NET: 360 mL    23 Oct 2020 07:  -  23 Oct 2020 13:54  --------------------------------------------------------  IN: 240 mL / OUT: 0 mL / NET: 240 mL        LABS:                        13.4   4.87  )-----------( 413      ( 21 Oct 2020 20:41 )             39.9     10-    140  |  101  |  11  ----------------------------<  88  3.6   |  22  |  1.09    Ca    8.4<L>      21 Oct 2020 20:41  Mg     2.0     10-    TPro  8.5<H>  /  Alb  3.6  /  TBili  0.2  /  DBili  x   /  AST  41<H>  /  ALT  29  /  AlkPhos  61  10-    PT/INR - ( 21 Oct 2020 20:41 )   PT: 11.6 sec;   INR: 1.00 ratio         PTT - ( 21 Oct 2020 20:41 )  PTT:28.2 sec  LIVER FUNCTIONS - ( 21 Oct 2020 20:41 )  Alb: 3.6 g/dL / Pro: 8.5 gm/dL / ALK PHOS: 61 U/L / ALT: 29 U/L / AST: 41 U/L / GGT: x                     MEDICATIONS:  atorvastatin 20 milliGRAM(s) Oral at bedtime  chlordiazePOXIDE 50 milliGRAM(s) Oral every 6 hours  folic acid 1 milliGRAM(s) Oral daily  heparin   Injectable 5000 Unit(s) SubCutaneous every 12 hours  LORazepam   Injectable 2 milliGRAM(s) IV Push every 1 hour PRN  multivitamin 1 Tablet(s) Oral daily  pantoprazole    Tablet 40 milliGRAM(s) Oral two times a day  sodium chloride 0.9%. 1000 milliLiter(s) IV Continuous <Continuous>  thiamine 100 milliGRAM(s) Oral daily

## 2020-10-23 NOTE — PROGRESS NOTE ADULT - ASSESSMENT
55M with a PMH of chronic ETOH abuse with hx of alcohol withdrawal with frequent hospital admissions, alcoholic gastritis/esophagitis, PUD, HLD, presented with abdominal pain, nausea, and vomiting.        ·  Problem: Chronic alcoholic gastritis without hemorrhage.  Plan: Continue PPI.  No s/s of bleeding.  Amylase and Lipase were minimally elevated on admission.  Will recheck.   ·  Problem: Alcohol withdrawal syndrome without complication.  Pt with extensive h/o of significant withdrawal.  Observe on CIWA protocol.  Start on Librium 50mg q6h.  Ativan prn.  Counselled on cessation. Responding to therapy.   ·  Problem: Lactic acid acidosis.  Plan: Lactate trending down.  Repeat Lactate this afternoon.  IVF.   ·  Problem: Dyslipidemia.  Plan: simvastatin.   ·  Problem: Preventive measure.  Plan: VTE prop: heparin sc q12 hrs.

## 2020-10-24 LAB
AMYLASE P1 CFR SERPL: 94 U/L — SIGNIFICANT CHANGE UP (ref 25–115)
ANION GAP SERPL CALC-SCNC: 5 MMOL/L — SIGNIFICANT CHANGE UP (ref 5–17)
BUN SERPL-MCNC: 5 MG/DL — LOW (ref 7–23)
CALCIUM SERPL-MCNC: 8.6 MG/DL — SIGNIFICANT CHANGE UP (ref 8.5–10.1)
CHLORIDE SERPL-SCNC: 111 MMOL/L — HIGH (ref 96–108)
CO2 SERPL-SCNC: 25 MMOL/L — SIGNIFICANT CHANGE UP (ref 22–31)
CREAT SERPL-MCNC: 0.79 MG/DL — SIGNIFICANT CHANGE UP (ref 0.5–1.3)
GLUCOSE SERPL-MCNC: 90 MG/DL — SIGNIFICANT CHANGE UP (ref 70–99)
HCT VFR BLD CALC: 34.4 % — LOW (ref 39–50)
HGB BLD-MCNC: 11 G/DL — LOW (ref 13–17)
LACTATE SERPL-SCNC: 0.8 MMOL/L — SIGNIFICANT CHANGE UP (ref 0.7–2)
LIDOCAIN IGE QN: 292 U/L — SIGNIFICANT CHANGE UP (ref 73–393)
MCHC RBC-ENTMCNC: 27.8 PG — SIGNIFICANT CHANGE UP (ref 27–34)
MCHC RBC-ENTMCNC: 32 GM/DL — SIGNIFICANT CHANGE UP (ref 32–36)
MCV RBC AUTO: 87.1 FL — SIGNIFICANT CHANGE UP (ref 80–100)
NRBC # BLD: 0 /100 WBCS — SIGNIFICANT CHANGE UP (ref 0–0)
PLATELET # BLD AUTO: 241 K/UL — SIGNIFICANT CHANGE UP (ref 150–400)
POTASSIUM SERPL-MCNC: 3.6 MMOL/L — SIGNIFICANT CHANGE UP (ref 3.5–5.3)
POTASSIUM SERPL-SCNC: 3.6 MMOL/L — SIGNIFICANT CHANGE UP (ref 3.5–5.3)
RBC # BLD: 3.95 M/UL — LOW (ref 4.2–5.8)
RBC # FLD: 14.5 % — SIGNIFICANT CHANGE UP (ref 10.3–14.5)
SODIUM SERPL-SCNC: 141 MMOL/L — SIGNIFICANT CHANGE UP (ref 135–145)
WBC # BLD: 3.11 K/UL — LOW (ref 3.8–10.5)
WBC # FLD AUTO: 3.11 K/UL — LOW (ref 3.8–10.5)

## 2020-10-24 PROCEDURE — 99232 SBSQ HOSP IP/OBS MODERATE 35: CPT

## 2020-10-24 RX ADMIN — Medication 50 MILLIGRAM(S): at 11:15

## 2020-10-24 RX ADMIN — Medication 50 MILLIGRAM(S): at 17:02

## 2020-10-24 RX ADMIN — SODIUM CHLORIDE 125 MILLILITER(S): 9 INJECTION INTRAMUSCULAR; INTRAVENOUS; SUBCUTANEOUS at 21:47

## 2020-10-24 RX ADMIN — Medication 1 MILLIGRAM(S): at 11:15

## 2020-10-24 RX ADMIN — SODIUM CHLORIDE 125 MILLILITER(S): 9 INJECTION INTRAMUSCULAR; INTRAVENOUS; SUBCUTANEOUS at 14:24

## 2020-10-24 RX ADMIN — Medication 50 MILLIGRAM(S): at 05:55

## 2020-10-24 RX ADMIN — HEPARIN SODIUM 5000 UNIT(S): 5000 INJECTION INTRAVENOUS; SUBCUTANEOUS at 05:56

## 2020-10-24 RX ADMIN — HEPARIN SODIUM 5000 UNIT(S): 5000 INJECTION INTRAVENOUS; SUBCUTANEOUS at 17:02

## 2020-10-24 RX ADMIN — PANTOPRAZOLE SODIUM 40 MILLIGRAM(S): 20 TABLET, DELAYED RELEASE ORAL at 17:02

## 2020-10-24 RX ADMIN — Medication 2 MILLIGRAM(S): at 18:06

## 2020-10-24 RX ADMIN — ATORVASTATIN CALCIUM 20 MILLIGRAM(S): 80 TABLET, FILM COATED ORAL at 21:45

## 2020-10-24 RX ADMIN — Medication 100 MILLIGRAM(S): at 11:15

## 2020-10-24 RX ADMIN — PANTOPRAZOLE SODIUM 40 MILLIGRAM(S): 20 TABLET, DELAYED RELEASE ORAL at 05:55

## 2020-10-24 RX ADMIN — Medication 1 TABLET(S): at 11:15

## 2020-10-24 RX ADMIN — SODIUM CHLORIDE 125 MILLILITER(S): 9 INJECTION INTRAMUSCULAR; INTRAVENOUS; SUBCUTANEOUS at 06:30

## 2020-10-24 NOTE — PROGRESS NOTE ADULT - SUBJECTIVE AND OBJECTIVE BOX
CIWA score 2-5.   ____________________PHYSICAL EXAM:  GENERAL:  NAD Alert and Oriented x 3   HEENT: NCAT  CARDIOVASCULAR:  S1, S2  LUNGS: CTAB  ABDOMEN:  soft, (-) tenderness, (-) distension, (+) bowel sounds, (-) guarding, (-) rebound (-) rigidity  EXTREMITIES:  no cyanosis / clubbing / edema.   ____________________    T(C): 36.9 (10-24-20 @ 10:26), Max: 36.9 (10-24-20 @ 10:26)  HR: 77 (10-24-20 @ 10:26) (72 - 77)  BP: 105/67 (10-24-20 @ 10:26) (105/67 - 136/85)  RR: 18 (10-24-20 @ 10:26) (17 - 18)  SpO2: 97% (10-24-20 @ 10:26) (94% - 97%)    MEDICATIONS  (STANDING):  atorvastatin 20 milliGRAM(s) Oral at bedtime  chlordiazePOXIDE 50 milliGRAM(s) Oral every 6 hours  folic acid 1 milliGRAM(s) Oral daily  heparin   Injectable 5000 Unit(s) SubCutaneous every 12 hours  multivitamin 1 Tablet(s) Oral daily  pantoprazole    Tablet 40 milliGRAM(s) Oral two times a day  sodium chloride 0.9%. 1000 milliLiter(s) (125 mL/Hr) IV Continuous <Continuous>  thiamine 100 milliGRAM(s) Oral daily    MEDICATIONS  (PRN):  LORazepam   Injectable 2 milliGRAM(s) IV Push every 1 hour PRN Symptom-triggered: each CIWA -Ar score 8 or GREATER                            11.0   3.11  )-----------( 241      ( 24 Oct 2020 08:05 )             34.4               141|111|5<90  3.6|25|0.79  8.6,--,--  10-24 @ 08:05

## 2020-10-24 NOTE — PROGRESS NOTE ADULT - ASSESSMENT
55M with a PMH of chronic ETOH abuse with hx of alcohol withdrawal with frequent hospital admissions, alcoholic gastritis/esophagitis, PUD, HLD, presented with abdominal pain, nausea, and vomiting.        ·  Problem: Chronic alcoholic gastritis without hemorrhage.  Plan: Continue PPI.  No s/s of bleeding.  Amylase and Lipase were minimally elevated on admission.     ·  Problem: Alcohol withdrawal syndrome without complication.  Pt with extensive h/o of significant withdrawal.  Observe on CIWA protocol.  Librium 50mg q6h.  Ativan prn.  Counselled on cessation. Responding to therapy.   ·  Problem: Lactic acid acidosis.  Plan: Lactate trending down.  Repeat Lactate this afternoon.  IVF.   ·  Problem: Dyslipidemia.  Plan: simvastatin.   ·  Problem: Preventive measure.  Plan: VTE prop: heparin sc q12 hrs.

## 2020-10-25 PROCEDURE — 99232 SBSQ HOSP IP/OBS MODERATE 35: CPT

## 2020-10-25 RX ADMIN — ATORVASTATIN CALCIUM 20 MILLIGRAM(S): 80 TABLET, FILM COATED ORAL at 22:09

## 2020-10-25 RX ADMIN — Medication 1 MILLIGRAM(S): at 13:15

## 2020-10-25 RX ADMIN — Medication 50 MILLIGRAM(S): at 05:24

## 2020-10-25 RX ADMIN — SODIUM CHLORIDE 125 MILLILITER(S): 9 INJECTION INTRAMUSCULAR; INTRAVENOUS; SUBCUTANEOUS at 05:19

## 2020-10-25 RX ADMIN — Medication 50 MILLIGRAM(S): at 00:18

## 2020-10-25 RX ADMIN — HEPARIN SODIUM 5000 UNIT(S): 5000 INJECTION INTRAVENOUS; SUBCUTANEOUS at 17:33

## 2020-10-25 RX ADMIN — Medication 100 MILLIGRAM(S): at 13:15

## 2020-10-25 RX ADMIN — SODIUM CHLORIDE 125 MILLILITER(S): 9 INJECTION INTRAMUSCULAR; INTRAVENOUS; SUBCUTANEOUS at 14:14

## 2020-10-25 RX ADMIN — PANTOPRAZOLE SODIUM 40 MILLIGRAM(S): 20 TABLET, DELAYED RELEASE ORAL at 05:24

## 2020-10-25 RX ADMIN — HEPARIN SODIUM 5000 UNIT(S): 5000 INJECTION INTRAVENOUS; SUBCUTANEOUS at 05:18

## 2020-10-25 RX ADMIN — Medication 2 MILLIGRAM(S): at 14:13

## 2020-10-25 RX ADMIN — Medication 50 MILLIGRAM(S): at 13:14

## 2020-10-25 RX ADMIN — Medication 50 MILLIGRAM(S): at 22:09

## 2020-10-25 RX ADMIN — Medication 2 MILLIGRAM(S): at 08:25

## 2020-10-25 RX ADMIN — Medication 1 TABLET(S): at 13:15

## 2020-10-25 RX ADMIN — PANTOPRAZOLE SODIUM 40 MILLIGRAM(S): 20 TABLET, DELAYED RELEASE ORAL at 17:33

## 2020-10-25 RX ADMIN — SODIUM CHLORIDE 125 MILLILITER(S): 9 INJECTION INTRAMUSCULAR; INTRAVENOUS; SUBCUTANEOUS at 22:09

## 2020-10-25 NOTE — PROGRESS NOTE ADULT - SUBJECTIVE AND OBJECTIVE BOX
Patient seen and examined.   No events over night.   CIWA score 2-5.   ____________________PHYSICAL EXAM:  GENERAL:  NAD Alert and Oriented x 3   HEENT: NCAT  CARDIOVASCULAR:  S1, S2  LUNGS: CTAB  ABDOMEN:  soft, (-) tenderness, (-) distension, (+) bowel sounds, (-) guarding, (-) rebound (-) rigidity  EXTREMITIES:  no cyanosis / clubbing / edema.   ____________________    T(C): 36.7 (10-25-20 @ 04:32), Max: 36.7 (10-25-20 @ 04:32)  HR: 61 (10-25-20 @ 04:32) (54 - 61)  BP: 124/81 (10-25-20 @ 04:32) (124/81 - 127/74)  RR: 18 (10-25-20 @ 04:32) (18 - 18)  SpO2: 98% (10-25-20 @ 04:32) (96% - 98%)      MEDICATIONS  (STANDING):  atorvastatin 20 milliGRAM(s) Oral at bedtime  chlordiazePOXIDE 50 milliGRAM(s) Oral every 6 hours  folic acid 1 milliGRAM(s) Oral daily  heparin   Injectable 5000 Unit(s) SubCutaneous every 12 hours  multivitamin 1 Tablet(s) Oral daily  pantoprazole    Tablet 40 milliGRAM(s) Oral two times a day  sodium chloride 0.9%. 1000 milliLiter(s) (125 mL/Hr) IV Continuous <Continuous>  thiamine 100 milliGRAM(s) Oral daily    MEDICATIONS  (PRN):  LORazepam   Injectable 2 milliGRAM(s) IV Push every 1 hour PRN Symptom-triggered: each CIWA -Ar score 8 or GREATER                          11.0   3.11  )-----------( 241      ( 24 Oct 2020 08:05 )             34.4

## 2020-10-25 NOTE — PROGRESS NOTE ADULT - ASSESSMENT
55M with a PMH of chronic ETOH abuse with hx of alcohol withdrawal with frequent hospital admissions, alcoholic gastritis/esophagitis, PUD, HLD, presented with abdominal pain, nausea, and vomiting.        ·  Problem: Chronic alcoholic gastritis without hemorrhage.  Plan: Continue PPI.  No s/s of bleeding.  Amylase and Lipase were minimally elevated on admission.     ·  Problem: Alcohol withdrawal syndrome without complication.  Pt with extensive h/o of significant withdrawal.  Observe on CIWA protocol.  Taper change Librium 50mg q8h. -10/25 .    Ativan prn.  Counselled on cessation. Responding to therapy.   ·  Problem: Lactic acid acidosis.  Plan: Lactate trending down.  Repeat Lactate this afternoon.  IVF.   ·  Problem: Dyslipidemia.  Plan: simvastatin.   ·  Problem: Preventive measure.  Plan: VTE prop: heparin sc q12 hrs.

## 2020-10-26 ENCOUNTER — TRANSCRIPTION ENCOUNTER (OUTPATIENT)
Age: 55
End: 2020-10-26

## 2020-10-26 VITALS
SYSTOLIC BLOOD PRESSURE: 124 MMHG | DIASTOLIC BLOOD PRESSURE: 79 MMHG | RESPIRATION RATE: 17 BRPM | TEMPERATURE: 98 F | HEART RATE: 58 BPM

## 2020-10-26 LAB
ALBUMIN SERPL ELPH-MCNC: 3 G/DL — LOW (ref 3.3–5)
ALP SERPL-CCNC: 43 U/L — SIGNIFICANT CHANGE UP (ref 40–120)
ALT FLD-CCNC: 17 U/L — SIGNIFICANT CHANGE UP (ref 12–78)
ANION GAP SERPL CALC-SCNC: 7 MMOL/L — SIGNIFICANT CHANGE UP (ref 5–17)
AST SERPL-CCNC: 18 U/L — SIGNIFICANT CHANGE UP (ref 15–37)
BILIRUB SERPL-MCNC: 0.2 MG/DL — SIGNIFICANT CHANGE UP (ref 0.2–1.2)
BUN SERPL-MCNC: 12 MG/DL — SIGNIFICANT CHANGE UP (ref 7–23)
CALCIUM SERPL-MCNC: 8.7 MG/DL — SIGNIFICANT CHANGE UP (ref 8.5–10.1)
CHLORIDE SERPL-SCNC: 111 MMOL/L — HIGH (ref 96–108)
CO2 SERPL-SCNC: 23 MMOL/L — SIGNIFICANT CHANGE UP (ref 22–31)
CREAT SERPL-MCNC: 1.05 MG/DL — SIGNIFICANT CHANGE UP (ref 0.5–1.3)
GLUCOSE SERPL-MCNC: 141 MG/DL — HIGH (ref 70–99)
HCT VFR BLD CALC: 33.6 % — LOW (ref 39–50)
HGB BLD-MCNC: 11 G/DL — LOW (ref 13–17)
MCHC RBC-ENTMCNC: 28.3 PG — SIGNIFICANT CHANGE UP (ref 27–34)
MCHC RBC-ENTMCNC: 32.7 GM/DL — SIGNIFICANT CHANGE UP (ref 32–36)
MCV RBC AUTO: 86.4 FL — SIGNIFICANT CHANGE UP (ref 80–100)
NRBC # BLD: 0 /100 WBCS — SIGNIFICANT CHANGE UP (ref 0–0)
PLATELET # BLD AUTO: 227 K/UL — SIGNIFICANT CHANGE UP (ref 150–400)
POTASSIUM SERPL-MCNC: 3.5 MMOL/L — SIGNIFICANT CHANGE UP (ref 3.5–5.3)
POTASSIUM SERPL-SCNC: 3.5 MMOL/L — SIGNIFICANT CHANGE UP (ref 3.5–5.3)
PROT SERPL-MCNC: 6.8 GM/DL — SIGNIFICANT CHANGE UP (ref 6–8.3)
RBC # BLD: 3.89 M/UL — LOW (ref 4.2–5.8)
RBC # FLD: 15.1 % — HIGH (ref 10.3–14.5)
SODIUM SERPL-SCNC: 141 MMOL/L — SIGNIFICANT CHANGE UP (ref 135–145)
WBC # BLD: 3.28 K/UL — LOW (ref 3.8–10.5)
WBC # FLD AUTO: 3.28 K/UL — LOW (ref 3.8–10.5)

## 2020-10-26 PROCEDURE — 99239 HOSP IP/OBS DSCHRG MGMT >30: CPT

## 2020-10-26 RX ORDER — CHLORHEXIDINE GLUCONATE 213 G/1000ML
1 SOLUTION TOPICAL
Refills: 0 | Status: DISCONTINUED | OUTPATIENT
Start: 2020-10-26 | End: 2020-10-26

## 2020-10-26 RX ADMIN — Medication 2 MILLIGRAM(S): at 10:29

## 2020-10-26 RX ADMIN — Medication 1 TABLET(S): at 11:40

## 2020-10-26 RX ADMIN — Medication 100 MILLIGRAM(S): at 11:40

## 2020-10-26 RX ADMIN — Medication 50 MILLIGRAM(S): at 05:35

## 2020-10-26 RX ADMIN — PANTOPRAZOLE SODIUM 40 MILLIGRAM(S): 20 TABLET, DELAYED RELEASE ORAL at 17:04

## 2020-10-26 RX ADMIN — PANTOPRAZOLE SODIUM 40 MILLIGRAM(S): 20 TABLET, DELAYED RELEASE ORAL at 05:35

## 2020-10-26 RX ADMIN — CHLORHEXIDINE GLUCONATE 1 APPLICATION(S): 213 SOLUTION TOPICAL at 10:29

## 2020-10-26 RX ADMIN — Medication 100 MILLIGRAM(S): at 02:37

## 2020-10-26 RX ADMIN — SODIUM CHLORIDE 125 MILLILITER(S): 9 INJECTION INTRAMUSCULAR; INTRAVENOUS; SUBCUTANEOUS at 11:41

## 2020-10-26 RX ADMIN — Medication 1 MILLIGRAM(S): at 11:40

## 2020-10-26 RX ADMIN — HEPARIN SODIUM 5000 UNIT(S): 5000 INJECTION INTRAVENOUS; SUBCUTANEOUS at 17:04

## 2020-10-26 RX ADMIN — Medication 25 MILLIGRAM(S): at 14:00

## 2020-10-26 RX ADMIN — HEPARIN SODIUM 5000 UNIT(S): 5000 INJECTION INTRAVENOUS; SUBCUTANEOUS at 05:35

## 2020-10-26 NOTE — DISCHARGE NOTE PROVIDER - HOSPITAL COURSE
55M with a PMH of chronic ETOH abuse with hx of alcohol withdrawal with frequent hospital admissions, alcoholic gastritis/esophagitis, PUD, HLD, presented with abdominal pain, nausea, and vomiting.        ·  Problem: Chronic alcoholic gastritis without hemorrhage.  Plan: Continue PPI.  No s/s of bleeding.  Amylase and Lipase wnl.  Asymptomatic.  H/H stable  ·  Problem: Alcohol withdrawal syndrome without complication.  Pt with extensive h/o of significant withdrawal.  Observe on CIWA protocol.  Taper po Librium.   Ativan prn.  Counselled on cessation. Responding to therapy.   ·  Problem: Lactic acid acidosis.  Plan: Lactate normalized.  ·  Problem: Dyslipidemia.  Plan: simvastatin.   ·  Problem: Preventive measure.  Plan: VTE prop: heparin sc q12 hrs.     Stable for d/c per pt's wishes.   Disposition: Stable for discharge.  Outpatient followup discussed.  Total time spent on discharge is  35  minutes.

## 2020-10-26 NOTE — PROGRESS NOTE ADULT - SUBJECTIVE AND OBJECTIVE BOX
Patient: VANDANA VILLA [id number] 55y Male                           Internal Medicine Hospitalist Progress Note    No complaints.  No Abdominal pain, nausea, vomiting, diarrhea, nor constipation.   Not tremulous  Wishing d/c.     ____________________PHYSICAL EXAM:  GENERAL:  NAD Alert and Oriented x 3   HEENT: NCAT  CARDIOVASCULAR:  S1, S2  LUNGS: CTAB  ABDOMEN:  soft, (-) tenderness, (-) distension, (+) bowel sounds, (-) guarding, (-) rebound (-) rigidity  EXTREMITIES:  no cyanosis / clubbing / edema.   ____________________     VITALS:  Vital Signs Last 24 Hrs  T(C): 36.7 (26 Oct 2020 10:30), Max: 36.9 (25 Oct 2020 15:58)  T(F): 98 (26 Oct 2020 10:30), Max: 98.4 (25 Oct 2020 15:58)  HR: 64 (26 Oct 2020 10:30) (53 - 64)  BP: 129/85 (26 Oct 2020 10:30) (119/54 - 129/85)  BP(mean): --  RR: 18 (26 Oct 2020 10:30) (17 - 18)  SpO2: 99% (26 Oct 2020 10:30) (96% - 99%) Daily     Daily Weight in k.5 (26 Oct 2020 05:26)  CAPILLARY BLOOD GLUCOSE        I&O's Summary    25 Oct 2020 07:01  -  26 Oct 2020 07:00  --------------------------------------------------------  IN: 3540 mL / OUT: 0 mL / NET: 3540 mL          BACKGROUND:  HEALTH ISSUES - PROBLEM Dx:  Preventive measure  Preventive measure    Dyslipidemia  Dyslipidemia    Lactic acid acidosis  Lactic acid acidosis    Alcohol withdrawal syndrome without complication  Alcohol withdrawal syndrome without complication    Chronic alcoholic gastritis without hemorrhage  Chronic alcoholic gastritis without hemorrhage          Allergies    No Known Allergies    Intolerances      PAST MEDICAL & SURGICAL HISTORY:  DTs (delirium tremens)    Substance abuse    Gastritis    EtOH dependence    Mixed hyperlipidemia    PUD (peptic ulcer disease)    No significant past surgical history          LABS:                        11.0   3.28  )-----------( 227      ( 26 Oct 2020 10:16 )             33.6     10-26    141  |  111<H>  |  12  ----------------------------<  141<H>  3.5   |  23  |  1.05    Ca    8.7      26 Oct 2020 10:16    TPro  6.8  /  Alb  3.0<L>  /  TBili  0.2  /  DBili  x   /  AST  18  /  ALT  17  /  AlkPhos  43  10-26      LIVER FUNCTIONS - ( 26 Oct 2020 10:16 )  Alb: 3.0 g/dL / Pro: 6.8 gm/dL / ALK PHOS: 43 U/L / ALT: 17 U/L / AST: 18 U/L / GGT: x                     MEDICATIONS:  MEDICATIONS  (STANDING):  atorvastatin 20 milliGRAM(s) Oral at bedtime  chlordiazePOXIDE 25 milliGRAM(s) Oral every 8 hours  chlorhexidine 4% Liquid 1 Application(s) Topical <User Schedule>  folic acid 1 milliGRAM(s) Oral daily  heparin   Injectable 5000 Unit(s) SubCutaneous every 12 hours  multivitamin 1 Tablet(s) Oral daily  pantoprazole    Tablet 40 milliGRAM(s) Oral two times a day  sodium chloride 0.9%. 1000 milliLiter(s) (125 mL/Hr) IV Continuous <Continuous>  thiamine 100 milliGRAM(s) Oral daily    MEDICATIONS  (PRN):  LORazepam   Injectable 2 milliGRAM(s) IV Push every 1 hour PRN Symptom-triggered: each CIWA -Ar score 8 or GREATER   Patient: VANDANA VILLA [id number] 55y Male                           Internal Medicine Hospitalist Progress Note    No complaints.  No Abdominal pain, nausea, vomiting, diarrhea, nor constipation.   Not tremulous  Wishing d/c.     ____________________PHYSICAL EXAM:  GENERAL:  NAD Alert and Oriented x 3   HEENT: NCAT  CARDIOVASCULAR:  S1, S2  LUNGS: CTAB  ABDOMEN:  soft, (-) tenderness, (-) distension, (+) bowel sounds, (-) guarding, (-) rebound (-) rigidity  EXTREMITIES:  no cyanosis / clubbing / edema.   NEURO: not tremulous.  Tandem gait.   ____________________     VITALS:  Vital Signs Last 24 Hrs  T(C): 36.7 (26 Oct 2020 10:30), Max: 36.9 (25 Oct 2020 15:58)  T(F): 98 (26 Oct 2020 10:30), Max: 98.4 (25 Oct 2020 15:58)  HR: 64 (26 Oct 2020 10:30) (53 - 64)  BP: 129/85 (26 Oct 2020 10:30) (119/54 - 129/85)  BP(mean): --  RR: 18 (26 Oct 2020 10:30) (17 - 18)  SpO2: 99% (26 Oct 2020 10:30) (96% - 99%) Daily     Daily Weight in k.5 (26 Oct 2020 05:26)  CAPILLARY BLOOD GLUCOSE        I&O's Summary    25 Oct 2020 07:01  -  26 Oct 2020 07:00  --------------------------------------------------------  IN: 3540 mL / OUT: 0 mL / NET: 3540 mL          BACKGROUND:  HEALTH ISSUES - PROBLEM Dx:  Preventive measure  Preventive measure    Dyslipidemia  Dyslipidemia    Lactic acid acidosis  Lactic acid acidosis    Alcohol withdrawal syndrome without complication  Alcohol withdrawal syndrome without complication    Chronic alcoholic gastritis without hemorrhage  Chronic alcoholic gastritis without hemorrhage          Allergies    No Known Allergies    Intolerances      PAST MEDICAL & SURGICAL HISTORY:  DTs (delirium tremens)    Substance abuse    Gastritis    EtOH dependence    Mixed hyperlipidemia    PUD (peptic ulcer disease)    No significant past surgical history          LABS:                        11.0   3.28  )-----------( 227      ( 26 Oct 2020 10:16 )             33.6     10-26    141  |  111<H>  |  12  ----------------------------<  141<H>  3.5   |  23  |  1.05    Ca    8.7      26 Oct 2020 10:16    TPro  6.8  /  Alb  3.0<L>  /  TBili  0.2  /  DBili  x   /  AST  18  /  ALT  17  /  AlkPhos  43  10-26      LIVER FUNCTIONS - ( 26 Oct 2020 10:16 )  Alb: 3.0 g/dL / Pro: 6.8 gm/dL / ALK PHOS: 43 U/L / ALT: 17 U/L / AST: 18 U/L / GGT: x                     MEDICATIONS:  MEDICATIONS  (STANDING):  atorvastatin 20 milliGRAM(s) Oral at bedtime  chlordiazePOXIDE 25 milliGRAM(s) Oral every 8 hours  chlorhexidine 4% Liquid 1 Application(s) Topical <User Schedule>  folic acid 1 milliGRAM(s) Oral daily  heparin   Injectable 5000 Unit(s) SubCutaneous every 12 hours  multivitamin 1 Tablet(s) Oral daily  pantoprazole    Tablet 40 milliGRAM(s) Oral two times a day  sodium chloride 0.9%. 1000 milliLiter(s) (125 mL/Hr) IV Continuous <Continuous>  thiamine 100 milliGRAM(s) Oral daily    MEDICATIONS  (PRN):  LORazepam   Injectable 2 milliGRAM(s) IV Push every 1 hour PRN Symptom-triggered: each CIWA -Ar score 8 or GREATER

## 2020-10-26 NOTE — DISCHARGE NOTE PROVIDER - NSDCCPCAREPLAN_GEN_ALL_CORE_FT
PRINCIPAL DISCHARGE DIAGNOSIS  Diagnosis: Alcohol withdrawal syndrome without complication  Assessment and Plan of Treatment:       SECONDARY DISCHARGE DIAGNOSES  Diagnosis: Acute alcoholic gastritis without hemorrhage  Assessment and Plan of Treatment:     Diagnosis: Dyslipidemia  Assessment and Plan of Treatment: Dyslipidemia    Diagnosis: Alcohol poisoning  Assessment and Plan of Treatment:     Diagnosis: Alcohol withdrawal syndrome without complication  Assessment and Plan of Treatment:

## 2020-10-26 NOTE — DISCHARGE NOTE PROVIDER - NSDCMRMEDTOKEN_GEN_ALL_CORE_FT
atorvastatin 20 mg oral tablet: 1 tab(s) orally once a day (at bedtime)  chlordiazePOXIDE 25 mg oral capsule: 1 cap(s) orally once a day MDD:1  folic acid 1 mg oral tablet: 1 tab(s) orally once a day  Multiple Vitamins oral tablet: 1 tab(s) orally once a day  pantoprazole 20 mg oral delayed release tablet: 1 tab(s) orally once a day  thiamine 100 mg oral tablet: 1 tab(s) orally once a day

## 2020-10-26 NOTE — DISCHARGE NOTE NURSING/CASE MANAGEMENT/SOCIAL WORK - PATIENT PORTAL LINK FT
You can access the FollowMyHealth Patient Portal offered by [facility] by registering at the following website: [email or web address]. By joining [employer]’s FollowMyHealth portal, you will also be able to view your health information using other applications (apps) compatible with our system.

## 2020-10-26 NOTE — PROGRESS NOTE ADULT - ASSESSMENT
55M with a PMH of chronic ETOH abuse with hx of alcohol withdrawal with frequent hospital admissions, alcoholic gastritis/esophagitis, PUD, HLD, presented with abdominal pain, nausea, and vomiting.        ·  Problem: Chronic alcoholic gastritis without hemorrhage.  Plan: Continue PPI.  No s/s of bleeding.  Amylase and Lipase wnl.  Asymptomatic.  H/H stable  ·  Problem: Alcohol withdrawal syndrome without complication.  Pt with extensive h/o of significant withdrawal.  Observe on CIWA protocol.  Taper po Librium.   Ativan prn.  Counselled on cessation. Responding to therapy.   ·  Problem: Lactic acid acidosis.  Plan: Lactate normalized.  ·  Problem: Dyslipidemia.  Plan: simvastatin.   ·  Problem: Preventive measure.  Plan: VTE prop: heparin sc q12 hrs.     Stable for d/c per pt's wishes.

## 2020-10-30 DIAGNOSIS — F10.239 ALCOHOL DEPENDENCE WITH WITHDRAWAL, UNSPECIFIED: ICD-10-CM

## 2020-10-30 DIAGNOSIS — K29.20 ALCOHOLIC GASTRITIS WITHOUT BLEEDING: ICD-10-CM

## 2020-10-30 DIAGNOSIS — T51.0X1A TOXIC EFFECT OF ETHANOL, ACCIDENTAL (UNINTENTIONAL), INITIAL ENCOUNTER: ICD-10-CM
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
## 2020-10-30 DIAGNOSIS — R78.0 FINDING OF ALCOHOL IN BLOOD: ICD-10-CM

## 2020-10-30 DIAGNOSIS — K27.9 PEPTIC ULCER, SITE UNSPECIFIED, UNSPECIFIED AS ACUTE OR CHRONIC, WITHOUT HEMORRHAGE OR PERFORATION: ICD-10-CM
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
## 2020-10-30 DIAGNOSIS — E87.2 ACIDOSIS: ICD-10-CM

## 2020-10-30 DIAGNOSIS — E78.5 HYPERLIPIDEMIA, UNSPECIFIED: ICD-10-CM

## 2020-11-02 ENCOUNTER — INPATIENT (INPATIENT)
Facility: HOSPITAL | Age: 55
LOS: 1 days | Discharge: HOME | End: 2020-11-04
Attending: INTERNAL MEDICINE | Admitting: INTERNAL MEDICINE
Payer: MEDICAID

## 2020-11-02 VITALS
SYSTOLIC BLOOD PRESSURE: 165 MMHG | HEART RATE: 148 BPM | DIASTOLIC BLOOD PRESSURE: 101 MMHG | RESPIRATION RATE: 20 BRPM | WEIGHT: 156.97 LBS | OXYGEN SATURATION: 98 % | TEMPERATURE: 98 F | HEIGHT: 67 IN

## 2020-11-02 LAB
ALBUMIN SERPL ELPH-MCNC: 4 G/DL — SIGNIFICANT CHANGE UP (ref 3.3–5)
ALP SERPL-CCNC: 58 U/L — SIGNIFICANT CHANGE UP (ref 40–120)
ALT FLD-CCNC: 30 U/L — SIGNIFICANT CHANGE UP (ref 12–78)
ANION GAP SERPL CALC-SCNC: 22 MMOL/L — HIGH (ref 5–17)
AST SERPL-CCNC: 40 U/L — HIGH (ref 15–37)
BASOPHILS # BLD AUTO: 0.07 K/UL — SIGNIFICANT CHANGE UP (ref 0–0.2)
BASOPHILS NFR BLD AUTO: 1 % — SIGNIFICANT CHANGE UP (ref 0–2)
BILIRUB SERPL-MCNC: 0.4 MG/DL — SIGNIFICANT CHANGE UP (ref 0.2–1.2)
BUN SERPL-MCNC: 19 MG/DL — SIGNIFICANT CHANGE UP (ref 7–23)
CALCIUM SERPL-MCNC: 9 MG/DL — SIGNIFICANT CHANGE UP (ref 8.5–10.1)
CHLORIDE SERPL-SCNC: 97 MMOL/L — SIGNIFICANT CHANGE UP (ref 96–108)
CK MB CFR SERPL CALC: 1 NG/ML — SIGNIFICANT CHANGE UP (ref 0.5–3.6)
CO2 SERPL-SCNC: 21 MMOL/L — LOW (ref 22–31)
CREAT SERPL-MCNC: 1.54 MG/DL — HIGH (ref 0.5–1.3)
EOSINOPHIL # BLD AUTO: 0 K/UL — SIGNIFICANT CHANGE UP (ref 0–0.5)
EOSINOPHIL NFR BLD AUTO: 0 % — SIGNIFICANT CHANGE UP (ref 0–6)
ETHANOL SERPL-MCNC: 305 MG/DL — HIGH (ref 0–10)
GLUCOSE SERPL-MCNC: 131 MG/DL — HIGH (ref 70–99)
HCT VFR BLD CALC: 45.2 % — SIGNIFICANT CHANGE UP (ref 39–50)
HGB BLD-MCNC: 14.5 G/DL — SIGNIFICANT CHANGE UP (ref 13–17)
IMM GRANULOCYTES NFR BLD AUTO: 0.3 % — SIGNIFICANT CHANGE UP (ref 0–1.5)
LYMPHOCYTES # BLD AUTO: 1.24 K/UL — SIGNIFICANT CHANGE UP (ref 1–3.3)
LYMPHOCYTES # BLD AUTO: 18.3 % — SIGNIFICANT CHANGE UP (ref 13–44)
MAGNESIUM SERPL-MCNC: 2.2 MG/DL — SIGNIFICANT CHANGE UP (ref 1.6–2.6)
MCHC RBC-ENTMCNC: 27.5 PG — SIGNIFICANT CHANGE UP (ref 27–34)
MCHC RBC-ENTMCNC: 32.1 GM/DL — SIGNIFICANT CHANGE UP (ref 32–36)
MCV RBC AUTO: 85.6 FL — SIGNIFICANT CHANGE UP (ref 80–100)
MONOCYTES # BLD AUTO: 0.42 K/UL — SIGNIFICANT CHANGE UP (ref 0–0.9)
MONOCYTES NFR BLD AUTO: 6.2 % — SIGNIFICANT CHANGE UP (ref 2–14)
NEUTROPHILS # BLD AUTO: 5.01 K/UL — SIGNIFICANT CHANGE UP (ref 1.8–7.4)
NEUTROPHILS NFR BLD AUTO: 74.2 % — SIGNIFICANT CHANGE UP (ref 43–77)
NRBC # BLD: 0 /100 WBCS — SIGNIFICANT CHANGE UP (ref 0–0)
PLATELET # BLD AUTO: 301 K/UL — SIGNIFICANT CHANGE UP (ref 150–400)
POTASSIUM SERPL-MCNC: 4.4 MMOL/L — SIGNIFICANT CHANGE UP (ref 3.5–5.3)
POTASSIUM SERPL-SCNC: 4.4 MMOL/L — SIGNIFICANT CHANGE UP (ref 3.5–5.3)
PROT SERPL-MCNC: 9.2 GM/DL — HIGH (ref 6–8.3)
RBC # BLD: 5.28 M/UL — SIGNIFICANT CHANGE UP (ref 4.2–5.8)
RBC # FLD: 15.7 % — HIGH (ref 10.3–14.5)
SARS-COV-2 RNA SPEC QL NAA+PROBE: SIGNIFICANT CHANGE UP
SODIUM SERPL-SCNC: 140 MMOL/L — SIGNIFICANT CHANGE UP (ref 135–145)
TROPONIN I SERPL-MCNC: <.015 NG/ML — SIGNIFICANT CHANGE UP (ref 0.01–0.04)
WBC # BLD: 6.76 K/UL — SIGNIFICANT CHANGE UP (ref 3.8–10.5)
WBC # FLD AUTO: 6.76 K/UL — SIGNIFICANT CHANGE UP (ref 3.8–10.5)

## 2020-11-02 PROCEDURE — 99223 1ST HOSP IP/OBS HIGH 75: CPT | Mod: AI

## 2020-11-02 PROCEDURE — 99285 EMERGENCY DEPT VISIT HI MDM: CPT

## 2020-11-02 PROCEDURE — 71045 X-RAY EXAM CHEST 1 VIEW: CPT | Mod: 26

## 2020-11-02 PROCEDURE — 93010 ELECTROCARDIOGRAM REPORT: CPT

## 2020-11-02 RX ORDER — MORPHINE SULFATE 50 MG/1
4 CAPSULE, EXTENDED RELEASE ORAL ONCE
Refills: 0 | Status: DISCONTINUED | OUTPATIENT
Start: 2020-11-02 | End: 2020-11-02

## 2020-11-02 RX ORDER — FOLIC ACID 0.8 MG
1 TABLET ORAL DAILY
Refills: 0 | Status: DISCONTINUED | OUTPATIENT
Start: 2020-11-02 | End: 2020-11-04

## 2020-11-02 RX ORDER — SODIUM CHLORIDE 9 MG/ML
1000 INJECTION, SOLUTION INTRAVENOUS ONCE
Refills: 0 | Status: COMPLETED | OUTPATIENT
Start: 2020-11-02 | End: 2020-11-02

## 2020-11-02 RX ORDER — THIAMINE MONONITRATE (VIT B1) 100 MG
500 TABLET ORAL ONCE
Refills: 0 | Status: COMPLETED | OUTPATIENT
Start: 2020-11-02 | End: 2020-11-02

## 2020-11-02 RX ORDER — FAMOTIDINE 10 MG/ML
20 INJECTION INTRAVENOUS
Refills: 0 | Status: DISCONTINUED | OUTPATIENT
Start: 2020-11-02 | End: 2020-11-03

## 2020-11-02 RX ORDER — FAMOTIDINE 10 MG/ML
20 INJECTION INTRAVENOUS ONCE
Refills: 0 | Status: COMPLETED | OUTPATIENT
Start: 2020-11-02 | End: 2020-11-02

## 2020-11-02 RX ORDER — SODIUM CHLORIDE 9 MG/ML
2000 INJECTION INTRAMUSCULAR; INTRAVENOUS; SUBCUTANEOUS ONCE
Refills: 0 | Status: COMPLETED | OUTPATIENT
Start: 2020-11-02 | End: 2020-11-02

## 2020-11-02 RX ORDER — ATORVASTATIN CALCIUM 80 MG/1
20 TABLET, FILM COATED ORAL AT BEDTIME
Refills: 0 | Status: DISCONTINUED | OUTPATIENT
Start: 2020-11-02 | End: 2020-11-04

## 2020-11-02 RX ORDER — THIAMINE MONONITRATE (VIT B1) 100 MG
100 TABLET ORAL DAILY
Refills: 0 | Status: DISCONTINUED | OUTPATIENT
Start: 2020-11-02 | End: 2020-11-04

## 2020-11-02 RX ORDER — PHENOBARBITAL 60 MG
64.8 TABLET ORAL ONCE
Refills: 0 | Status: DISCONTINUED | OUTPATIENT
Start: 2020-11-02 | End: 2020-11-02

## 2020-11-02 RX ORDER — SUCRALFATE 1 G
1 TABLET ORAL
Refills: 0 | Status: DISCONTINUED | OUTPATIENT
Start: 2020-11-02 | End: 2020-11-04

## 2020-11-02 RX ORDER — ONDANSETRON 8 MG/1
4 TABLET, FILM COATED ORAL ONCE
Refills: 0 | Status: COMPLETED | OUTPATIENT
Start: 2020-11-02 | End: 2020-11-02

## 2020-11-02 RX ORDER — METOCLOPRAMIDE HCL 10 MG
10 TABLET ORAL ONCE
Refills: 0 | Status: COMPLETED | OUTPATIENT
Start: 2020-11-02 | End: 2020-11-02

## 2020-11-02 RX ADMIN — MORPHINE SULFATE 4 MILLIGRAM(S): 50 CAPSULE, EXTENDED RELEASE ORAL at 16:49

## 2020-11-02 RX ADMIN — MORPHINE SULFATE 4 MILLIGRAM(S): 50 CAPSULE, EXTENDED RELEASE ORAL at 16:23

## 2020-11-02 RX ADMIN — Medication 64.8 MILLIGRAM(S): at 18:34

## 2020-11-02 RX ADMIN — MORPHINE SULFATE 4 MILLIGRAM(S): 50 CAPSULE, EXTENDED RELEASE ORAL at 19:15

## 2020-11-02 RX ADMIN — FAMOTIDINE 20 MILLIGRAM(S): 10 INJECTION INTRAVENOUS at 16:23

## 2020-11-02 RX ADMIN — MORPHINE SULFATE 4 MILLIGRAM(S): 50 CAPSULE, EXTENDED RELEASE ORAL at 20:22

## 2020-11-02 RX ADMIN — Medication 1 GRAM(S): at 23:57

## 2020-11-02 RX ADMIN — SODIUM CHLORIDE 2000 MILLILITER(S): 9 INJECTION INTRAMUSCULAR; INTRAVENOUS; SUBCUTANEOUS at 18:36

## 2020-11-02 RX ADMIN — SODIUM CHLORIDE 1000 MILLILITER(S): 9 INJECTION, SOLUTION INTRAVENOUS at 17:58

## 2020-11-02 RX ADMIN — MORPHINE SULFATE 4 MILLIGRAM(S): 50 CAPSULE, EXTENDED RELEASE ORAL at 18:23

## 2020-11-02 RX ADMIN — SODIUM CHLORIDE 2000 MILLILITER(S): 9 INJECTION INTRAMUSCULAR; INTRAVENOUS; SUBCUTANEOUS at 16:23

## 2020-11-02 RX ADMIN — Medication 10 MILLIGRAM(S): at 18:24

## 2020-11-02 RX ADMIN — Medication 500 MILLIGRAM(S): at 19:32

## 2020-11-02 RX ADMIN — ONDANSETRON 4 MILLIGRAM(S): 8 TABLET, FILM COATED ORAL at 16:23

## 2020-11-02 RX ADMIN — Medication 2 MILLIGRAM(S): at 22:31

## 2020-11-02 RX ADMIN — SODIUM CHLORIDE 1000 MILLILITER(S): 9 INJECTION, SOLUTION INTRAVENOUS at 18:36

## 2020-11-02 RX ADMIN — Medication 105 MILLIGRAM(S): at 18:32

## 2020-11-02 RX ADMIN — MORPHINE SULFATE 4 MILLIGRAM(S): 50 CAPSULE, EXTENDED RELEASE ORAL at 20:40

## 2020-11-02 RX ADMIN — ATORVASTATIN CALCIUM 20 MILLIGRAM(S): 80 TABLET, FILM COATED ORAL at 23:55

## 2020-11-02 NOTE — ED ADULT NURSE NOTE - HOW OFTEN DO YOU HAVE A DRINK CONTAINING ALCOHOL?
[FreeTextEntry1] : Right lung adenocarcinoma\par Stage IV (pleural effusion, celiac lymph node)\par MRI joyce without brain metastases\par Ho renal transplant\par Hepatitic C\par \par Discussed about the diagnosis, treatment options, prognosis\par Discussed about the likely incurable nature of his disease\par Foundation 1 panel requested on the biopsy specimen\par He has seen Dr Valdez for Hep C- who recommends him to complete Hep C treatment prior to starting chemotherapy which is ~12 weeks\par I have explained to the patient that given the stage and his symptomatology, we may not be able to wait 12 weeks for the treatment\par Await foundation panel- targeted therapy PRN. If no targetable mutations, will consider chemotherapy. There is retrospective data from [facility] which suggested ~41% rate of Allograft rejection with median time to rejection was 21?days. \par \par Family ho pancreatic cancer\par Elevated \par No findings on pancreas on the PET \par Consider MRI at some point\par Genetic testing today\par \par Follow up in 3 weeks\par No labs\par 
Four or more times a week

## 2020-11-02 NOTE — ED PROVIDER NOTE - CARE PLAN
Principal Discharge DX:	Alcoholic intoxication without complication  Secondary Diagnosis:	Acute alcoholic gastritis without hemorrhage

## 2020-11-02 NOTE — ED ADULT NURSE NOTE - NSIMPLEMENTINTERV_GEN_ALL_ED
Implemented All Fall with Harm Risk Interventions:  Section to call system. Call bell, personal items and telephone within reach. Instruct patient to call for assistance. Room bathroom lighting operational. Non-slip footwear when patient is off stretcher. Physically safe environment: no spills, clutter or unnecessary equipment. Stretcher in lowest position, wheels locked, appropriate side rails in place. Provide visual cue, wrist band, yellow gown, etc. Monitor gait and stability. Monitor for mental status changes and reorient to person, place, and time. Review medications for side effects contributing to fall risk. Reinforce activity limits and safety measures with patient and family. Provide visual clues: red socks.

## 2020-11-02 NOTE — ED ADULT NURSE NOTE - OBJECTIVE STATEMENT
received er bed 22 brought to er by family for n/v abdominal pain and headache pt known etoh abuse seen frequently in er for same c/o ciwa =11 states drinks  1 bottle of vodka daily and hasn't drank x 3 days due to stomach pain and vomiting a&ox3 gastric contents brownish abdomen soft no distention noted

## 2020-11-02 NOTE — ED PROVIDER NOTE - PHYSICAL EXAMINATION
Gen: Patient is somnolence but arousable. Seems to be intoxicated.   Head: NC, AT, PERRL, EOMI, normal lids/conjunctiva  ENT: normal hearing, patent oropharynx without erythema/exudate, uvula midline  Neck: +supple, no tenderness/meningismus/JVD, +Trachea midline  Pulm: Bilateral BS, normal resp effort, no wheeze/stridor/retractions  CV: Tachycardiac, no M/R/G, +dist pulses  Abd: soft, NT/ND, Negative [place] signs, +BS, no palpable masses  Mskel: no edema/erythema/cyanosis  Skin: no rash, warm/dry  Neuro: AAOx3, no apparent sensory/motor deficits, coordination intact. Intentional tremor

## 2020-11-02 NOTE — ED PROVIDER NOTE - OBJECTIVE STATEMENT
53 y/o M with a PMHx of Delirium tremen, HLD, EtOH dependence, Gastritis, PUD and Substance abuse presents to the ED c/o abdominal pain associated with nausea and emesis x3-4 days. Patient states he is unable to keep food down. Patient states his last drink was on Saturday since then was unable to keep anything down. Patient admits to drinking 1 bottle of vodka everyday. 56 y/o M with a PMHx of Delirium tremen, HLD, EtOH dependence, Gastritis, PUD and Substance abuse presents to the ED c/o abdominal pain associated with nausea and emesis x3-4 days. Patient states he is unable to keep food down. Patient states his last drink was on Saturday since then was unable to keep anything down. Patient admits to drinking 1 bottle of vodka everyday.

## 2020-11-02 NOTE — H&P ADULT - HISTORY OF PRESENT ILLNESS
Pt is a 54 y/o male w/ ETOH abuse w/frequent admissions,  GERD, HLD, alcoholic gastritis/esophagitis, PUD, hx of hypoxic respiratory failure requiring intubation due to aspiration PNA comes w/abdominal pain mostly in epigastric area and also reports emesis states had 1 episdoe w/blood tinge. Pt states that he had some guests and no longer working so been drinking more for the past four days.  and today started to get more abdominal pain and n/v and feels generalized fatigue.   Usually drinks vodka almost a bottle on regular basis and more over the last 4 days.  pt denies any fever, chills, sob, cp, palpitaions, +n/+v/-d/-c no travels or sick contacts.

## 2020-11-02 NOTE — H&P ADULT - NSHPLABSRESULTS_GEN_ALL_CORE
LABS:                        14.5   6.76  )-----------( 301      ( 02 Nov 2020 16:16 )             45.2     11-02    140  |  97  |  19  ----------------------------<  131<H>  4.4   |  21<L>  |  1.54<H>    Ca    9.0      02 Nov 2020 16:16  Mg     2.2     11-02    TPro  9.2<H>  /  Alb  4.0  /  TBili  0.4  /  DBili  x   /  AST  40<H>  /  ALT  30  /  AlkPhos  58  11-02        CAPILLARY BLOOD GLUCOSE            RADIOLOGY & ADDITIONAL TESTS:    Imaging Personally Reviewed:  [ X] YES  [ ] NO

## 2020-11-02 NOTE — H&P ADULT - NSHPPHYSICALEXAM_GEN_ALL_CORE
Vital Signs Last 24 Hrs  T(C): 36.6 (02 Nov 2020 15:05), Max: 36.6 (02 Nov 2020 15:05)  T(F): 97.8 (02 Nov 2020 15:05), Max: 97.8 (02 Nov 2020 15:05)  HR: 126 (02 Nov 2020 19:05) (126 - 148)  BP: 145/78 (02 Nov 2020 19:05) (145/78 - 165/101)  BP(mean): --  RR: 20 (02 Nov 2020 19:05) (20 - 20)  SpO2: 98% (02 Nov 2020 19:05) (98% - 98%)  PHYSICAL EXAM:    GENERAL: NAD, well-groomed, well-developed  HEAD:  Atraumatic, Normocephalic  EYES: EOMI, PERRLA, conjunctiva and sclera clear  ENMT: No tonsillar erythema, exudates, or enlargement; Moist mucous membranes, No lesions  NECK: Supple, No JVD, Normal thyroid  NERVOUS SYSTEM:  Alert & Oriented X3, Motor Strength 5/5 B/L upper and lower extremities; DTRs 2+ intact and symmetric slightly tremulous  CHEST/LUNG: Clear to percussion bilaterally; No rales, rhonchi, wheezing, or rubs  HEART: Regular rate and rhythm; No rubs, or gallops, +S1,S2  ABDOMEN: Soft, Nontender, Nondistended; Bowel sounds present  EXTREMITIES:  2+ Peripheral Pulses, No clubbing, cyanosis, or edema  LYMPH: No cervical adenopathy  RECTAL: deferred  BREAST: No palpatble masses, skin no lesions   : deferred  SKIN: No rashes or lesions    IMPROVE VTE Individual Risk Assessment        RISK                                                          Points  [  ] Previous VTE                                                3  [  ] Thrombophilia                                             2  [  ] Lower limb paralysis                                    2        (unable to hold up >15 seconds)    [  ] Current Cancer                                             2         (within 6 months)  [  ] Immobilization > 24 hrs                              1  [  ] ICU/CCU stay > 24 hours                            1  [  ] Age > 60                                                    1  IMPROVE VTE Score ____0_____

## 2020-11-03 LAB
HCT VFR BLD CALC: 31.9 % — LOW (ref 39–50)
HGB BLD-MCNC: 10.4 G/DL — LOW (ref 13–17)
MCHC RBC-ENTMCNC: 27.9 PG — SIGNIFICANT CHANGE UP (ref 27–34)
MCHC RBC-ENTMCNC: 32.6 GM/DL — SIGNIFICANT CHANGE UP (ref 32–36)
MCV RBC AUTO: 85.5 FL — SIGNIFICANT CHANGE UP (ref 80–100)
NRBC # BLD: 0 /100 WBCS — SIGNIFICANT CHANGE UP (ref 0–0)
PLATELET # BLD AUTO: 187 K/UL — SIGNIFICANT CHANGE UP (ref 150–400)
RBC # BLD: 3.73 M/UL — LOW (ref 4.2–5.8)
RBC # FLD: 15.6 % — HIGH (ref 10.3–14.5)
SARS-COV-2 IGG SERPL QL IA: NEGATIVE — SIGNIFICANT CHANGE UP
SARS-COV-2 IGM SERPL IA-ACNC: <0.1 INDEX — SIGNIFICANT CHANGE UP
WBC # BLD: 4.94 K/UL — SIGNIFICANT CHANGE UP (ref 3.8–10.5)
WBC # FLD AUTO: 4.94 K/UL — SIGNIFICANT CHANGE UP (ref 3.8–10.5)

## 2020-11-03 PROCEDURE — 99232 SBSQ HOSP IP/OBS MODERATE 35: CPT

## 2020-11-03 RX ORDER — SODIUM CHLORIDE 9 MG/ML
1000 INJECTION INTRAMUSCULAR; INTRAVENOUS; SUBCUTANEOUS
Refills: 0 | Status: DISCONTINUED | OUTPATIENT
Start: 2020-11-03 | End: 2020-11-04

## 2020-11-03 RX ORDER — HEPARIN SODIUM 5000 [USP'U]/ML
5000 INJECTION INTRAVENOUS; SUBCUTANEOUS EVERY 12 HOURS
Refills: 0 | Status: DISCONTINUED | OUTPATIENT
Start: 2020-11-03 | End: 2020-11-04

## 2020-11-03 RX ORDER — MORPHINE SULFATE 50 MG/1
2 CAPSULE, EXTENDED RELEASE ORAL EVERY 8 HOURS
Refills: 0 | Status: DISCONTINUED | OUTPATIENT
Start: 2020-11-03 | End: 2020-11-04

## 2020-11-03 RX ORDER — FAMOTIDINE 10 MG/ML
20 INJECTION INTRAVENOUS
Refills: 0 | Status: DISCONTINUED | OUTPATIENT
Start: 2020-11-03 | End: 2020-11-04

## 2020-11-03 RX ADMIN — MORPHINE SULFATE 2 MILLIGRAM(S): 50 CAPSULE, EXTENDED RELEASE ORAL at 00:28

## 2020-11-03 RX ADMIN — Medication 2 MILLIGRAM(S): at 10:25

## 2020-11-03 RX ADMIN — Medication 1 GRAM(S): at 05:32

## 2020-11-03 RX ADMIN — Medication 1 GRAM(S): at 11:36

## 2020-11-03 RX ADMIN — Medication 1 MILLIGRAM(S): at 11:36

## 2020-11-03 RX ADMIN — Medication 1.5 MILLIGRAM(S): at 22:19

## 2020-11-03 RX ADMIN — MORPHINE SULFATE 2 MILLIGRAM(S): 50 CAPSULE, EXTENDED RELEASE ORAL at 00:45

## 2020-11-03 RX ADMIN — MORPHINE SULFATE 2 MILLIGRAM(S): 50 CAPSULE, EXTENDED RELEASE ORAL at 16:25

## 2020-11-03 RX ADMIN — Medication 2 MILLIGRAM(S): at 05:32

## 2020-11-03 RX ADMIN — Medication 100 MILLIGRAM(S): at 11:41

## 2020-11-03 RX ADMIN — FAMOTIDINE 20 MILLIGRAM(S): 10 INJECTION INTRAVENOUS at 17:50

## 2020-11-03 RX ADMIN — Medication 1 TABLET(S): at 11:36

## 2020-11-03 RX ADMIN — Medication 2 MILLIGRAM(S): at 14:06

## 2020-11-03 RX ADMIN — ATORVASTATIN CALCIUM 20 MILLIGRAM(S): 80 TABLET, FILM COATED ORAL at 21:42

## 2020-11-03 RX ADMIN — SODIUM CHLORIDE 75 MILLILITER(S): 9 INJECTION INTRAMUSCULAR; INTRAVENOUS; SUBCUTANEOUS at 21:50

## 2020-11-03 RX ADMIN — Medication 2 MILLIGRAM(S): at 17:50

## 2020-11-03 RX ADMIN — Medication 1 GRAM(S): at 17:50

## 2020-11-03 RX ADMIN — Medication 2 MILLIGRAM(S): at 02:13

## 2020-11-03 RX ADMIN — FAMOTIDINE 20 MILLIGRAM(S): 10 INJECTION INTRAVENOUS at 05:31

## 2020-11-03 NOTE — PROGRESS NOTE ADULT - SUBJECTIVE AND OBJECTIVE BOX
Patient is a 55y old  Male who presents with a chief complaint of abdominal pain (02 Nov 2020 22:11)    INTERVAL HPI/OVERNIGHT EVENTS:  Pt was seen and examined, no acute events.    MEDICATIONS  (STANDING):  atorvastatin 20 milliGRAM(s) Oral at bedtime  famotidine    Tablet 20 milliGRAM(s) Oral two times a day  folic acid 1 milliGRAM(s) Oral daily  LORazepam   Injectable   IV Push   LORazepam   Injectable 1.5 milliGRAM(s) IV Push every 4 hours  multivitamin 1 Tablet(s) Oral daily  sucralfate suspension 1 Gram(s) Oral four times a day  thiamine 100 milliGRAM(s) Oral daily    MEDICATIONS  (PRN):  LORazepam   Injectable 2 milliGRAM(s) IV Push every 1 hour PRN CIWA-Ar score 8 or greater  morphine  - Injectable 2 milliGRAM(s) IV Push every 8 hours PRN Severe Pain (7 - 10)      Allergies  No Known Allergies      Vital Signs Last 24 Hrs  T(C): 37.3 (03 Nov 2020 17:06), Max: 37.3 (03 Nov 2020 12:55)  T(F): 99.1 (03 Nov 2020 17:06), Max: 99.1 (03 Nov 2020 12:55)  HR: 92 (03 Nov 2020 17:06) (85 - 126)  BP: 146/93 (03 Nov 2020 17:06) (127/70 - 146/93)  BP(mean): --  RR: 18 (03 Nov 2020 17:06) (17 - 20)  SpO2: 100% (03 Nov 2020 17:06) (96% - 100%)      PHYSICAL EXAM:  GENERAL: NAD  HEAD:  Atraumatic  EYES: PERRLA  NERVOUS SYSTEM:  Awake, alert  CHEST/LUNG: Clear  HEART: RRR  ABDOMEN: Soft, non tedner  EXTREMITIES:  no edema      LABS:                        10.4   4.94  )-----------( 187      ( 03 Nov 2020 06:31 )             31.9     11-02    140  |  97  |  19  ----------------------------<  131<H>  4.4   |  21<L>  |  1.54<H>    Ca    9.0      02 Nov 2020 16:16  Mg     2.2     11-02    TPro  9.2<H>  /  Alb  4.0  /  TBili  0.4  /  DBili  x   /  AST  40<H>  /  ALT  30  /  AlkPhos  58  11-02        CAPILLARY BLOOD GLUCOSE          RADIOLOGY & ADDITIONAL TESTS:    Imaging Personally Reviewed:  [ ] YES  [ ] NO    Consultant(s) Notes Reviewed:  [ ] YES  [ ] NO    Care Discussed with Consultants/Other Providers [ ] YES  [ ] NO

## 2020-11-03 NOTE — PROGRESS NOTE ADULT - ASSESSMENT
pt w/recurrent admission for etoh abuse and gastritis comes w/likely gastritis.    Alcoholic intoxication with complication:  - On Ativan per CIWA protocol  - mvi/folate/thaimine  - Encouraged cessation.     Chronic alcoholic gastritis:  - On clear liquid diet, advance as tolerated  - Continue Pepcid/ Carafate     Dyslipidemia:  - Continue statin.   - LFTs ok    ROSA:  - Likely pre renal  - Trial of IVF  - Follow up repeat    Anemia:  - Hb drop since last admission  - Normocytic  - Follow up repeat CBC    DVT ppx:  - Start S/Q heparin

## 2020-11-04 ENCOUNTER — TRANSCRIPTION ENCOUNTER (OUTPATIENT)
Age: 55
End: 2020-11-04

## 2020-11-04 VITALS — WEIGHT: 165.79 LBS

## 2020-11-04 DIAGNOSIS — F10.20 ALCOHOL DEPENDENCE, UNCOMPLICATED: ICD-10-CM

## 2020-11-04 LAB
ALBUMIN SERPL ELPH-MCNC: 3.2 G/DL — LOW (ref 3.3–5)
ALP SERPL-CCNC: 48 U/L — SIGNIFICANT CHANGE UP (ref 40–120)
ALT FLD-CCNC: 23 U/L — SIGNIFICANT CHANGE UP (ref 12–78)
ANION GAP SERPL CALC-SCNC: 6 MMOL/L — SIGNIFICANT CHANGE UP (ref 5–17)
AST SERPL-CCNC: 29 U/L — SIGNIFICANT CHANGE UP (ref 15–37)
BILIRUB SERPL-MCNC: 0.9 MG/DL — SIGNIFICANT CHANGE UP (ref 0.2–1.2)
BUN SERPL-MCNC: 5 MG/DL — LOW (ref 7–23)
CALCIUM SERPL-MCNC: 8.8 MG/DL — SIGNIFICANT CHANGE UP (ref 8.5–10.1)
CHLORIDE SERPL-SCNC: 102 MMOL/L — SIGNIFICANT CHANGE UP (ref 96–108)
CO2 SERPL-SCNC: 28 MMOL/L — SIGNIFICANT CHANGE UP (ref 22–31)
CREAT SERPL-MCNC: 0.74 MG/DL — SIGNIFICANT CHANGE UP (ref 0.5–1.3)
GLUCOSE SERPL-MCNC: 108 MG/DL — HIGH (ref 70–99)
HCT VFR BLD CALC: 32.3 % — LOW (ref 39–50)
HGB BLD-MCNC: 10.5 G/DL — LOW (ref 13–17)
MCHC RBC-ENTMCNC: 27.7 PG — SIGNIFICANT CHANGE UP (ref 27–34)
MCHC RBC-ENTMCNC: 32.5 GM/DL — SIGNIFICANT CHANGE UP (ref 32–36)
MCV RBC AUTO: 85.2 FL — SIGNIFICANT CHANGE UP (ref 80–100)
NRBC # BLD: 0 /100 WBCS — SIGNIFICANT CHANGE UP (ref 0–0)
PLATELET # BLD AUTO: 179 K/UL — SIGNIFICANT CHANGE UP (ref 150–400)
POTASSIUM SERPL-MCNC: 3.5 MMOL/L — SIGNIFICANT CHANGE UP (ref 3.5–5.3)
POTASSIUM SERPL-SCNC: 3.5 MMOL/L — SIGNIFICANT CHANGE UP (ref 3.5–5.3)
PROT SERPL-MCNC: 7.1 GM/DL — SIGNIFICANT CHANGE UP (ref 6–8.3)
RBC # BLD: 3.79 M/UL — LOW (ref 4.2–5.8)
RBC # FLD: 14.8 % — HIGH (ref 10.3–14.5)
SODIUM SERPL-SCNC: 136 MMOL/L — SIGNIFICANT CHANGE UP (ref 135–145)
WBC # BLD: 3.89 K/UL — SIGNIFICANT CHANGE UP (ref 3.8–10.5)
WBC # FLD AUTO: 3.89 K/UL — SIGNIFICANT CHANGE UP (ref 3.8–10.5)

## 2020-11-04 PROCEDURE — 99239 HOSP IP/OBS DSCHRG MGMT >30: CPT

## 2020-11-04 PROCEDURE — 90792 PSYCH DIAG EVAL W/MED SRVCS: CPT

## 2020-11-04 RX ORDER — POTASSIUM CHLORIDE 20 MEQ
10 PACKET (EA) ORAL DAILY
Refills: 0 | Status: DISCONTINUED | OUTPATIENT
Start: 2020-11-04 | End: 2020-11-04

## 2020-11-04 RX ORDER — PHENOBARBITAL 60 MG
130 TABLET ORAL
Refills: 0 | Status: DISCONTINUED | OUTPATIENT
Start: 2020-11-04 | End: 2020-11-04

## 2020-11-04 RX ORDER — HALOPERIDOL DECANOATE 100 MG/ML
4 INJECTION INTRAMUSCULAR EVERY 6 HOURS
Refills: 0 | Status: DISCONTINUED | OUTPATIENT
Start: 2020-11-04 | End: 2020-11-04

## 2020-11-04 RX ADMIN — Medication 1 GRAM(S): at 06:28

## 2020-11-04 RX ADMIN — Medication 10 MILLIEQUIVALENT(S): at 11:50

## 2020-11-04 RX ADMIN — Medication 1.5 MILLIGRAM(S): at 01:44

## 2020-11-04 RX ADMIN — Medication 1 GRAM(S): at 11:42

## 2020-11-04 RX ADMIN — Medication 1 TABLET(S): at 11:43

## 2020-11-04 RX ADMIN — Medication 1 MILLIGRAM(S): at 11:42

## 2020-11-04 RX ADMIN — Medication 100 MILLIGRAM(S): at 11:42

## 2020-11-04 RX ADMIN — SODIUM CHLORIDE 75 MILLILITER(S): 9 INJECTION INTRAMUSCULAR; INTRAVENOUS; SUBCUTANEOUS at 07:43

## 2020-11-04 RX ADMIN — Medication 1.5 MILLIGRAM(S): at 09:16

## 2020-11-04 RX ADMIN — FAMOTIDINE 20 MILLIGRAM(S): 10 INJECTION INTRAVENOUS at 06:28

## 2020-11-04 RX ADMIN — HEPARIN SODIUM 5000 UNIT(S): 5000 INJECTION INTRAVENOUS; SUBCUTANEOUS at 06:28

## 2020-11-04 RX ADMIN — Medication 1 GRAM(S): at 00:44

## 2020-11-04 RX ADMIN — Medication 2 MILLIGRAM(S): at 11:42

## 2020-11-04 RX ADMIN — Medication 1.5 MILLIGRAM(S): at 06:29

## 2020-11-04 NOTE — BEHAVIORAL HEALTH ASSESSMENT NOTE - OTHER
superficially cooperative tremors not noted deferred at this time does not verbally engage with Writer does not verbalize looks confused unable to ascertain due to limitation of exam does not seem to be responding ot internal stimuli see HPI

## 2020-11-04 NOTE — BEHAVIORAL HEALTH ASSESSMENT NOTE - NSBHCHARTREVIEWLAB_PSY_A_CORE FT
11-04    136  |  102  |  5<L>  ----------------------------<  108<H>  3.5   |  28  |  0.74    Ca    8.8      04 Nov 2020 06:38  Mg     2.2     11-02    TPro  7.1  /  Alb  3.2<L>  /  TBili  0.9  /  DBili  x   /  AST  29  /  ALT  23  /  AlkPhos  48  11-04

## 2020-11-04 NOTE — BEHAVIORAL HEALTH ASSESSMENT NOTE - HPI (INCLUDE ILLNESS QUALITY, SEVERITY, DURATION, TIMING, CONTEXT, MODIFYING FACTORS, ASSOCIATED SIGNS AND SYMPTOMS)
PATIENT KNOWN TO WRITER WHO LAST SAW HIM 10/8/2020: 56 yo [place]  [place] male, lives with his son in a private home, works as a , with long history of continuous Alcohol Abuse spanning decades (at most reports 2-3 bottles of vodka 1-2/day), with associated numerous ED visits and hospital admissions (ie. epigastric pain, vomiting, nausea, hematemesis, aspiration pneumonia, esophageal ulcer, UGI bleed) presenting again with c/o "presents with abd pain, vomiting with BAL was 305 on admission.     EXAM: Patient sitting in bed and presenting at his well known baseline known to Writer. He is calm, cooperative, redirectable and asking about his clothing and "good shoes." He would like to go home and says his son can pick him up. As before, he minimizes his drinking after he was reminded to start decreased his use as he has a fatty liver already and GI issues - he states that he had "no problem since 1993" (ie. no medical issues stemming from drinking in his younger years). Substance abuse education and impact on health provided. Patient strongly encouraged to enlist in substance abuse services. Patient at this time endorses stable euthymic mood, regular sleep / appetite / energy level / concentration / bathroom habits. Denies any symptoms of hypomania/alma/psychosis/major depression/ anxiety/panic. Denies any active or passive suicidal or homicidal ideation. Names protective factors (uma; family; hope for future, his work). Denies access to guns and denies illicit drug use.  PHYSICAL INSPECTION: Patient does not manifest any alcohol withdrawal signs and denies symptoms. No tremors et al     ISTOP Reference #:[id number]   04/14/2020 04/15/2020 chlordiazepoxide 25 mg capsule  20 5 Hailey Garrett A, Medicaid [facility] #[id number]

## 2020-11-04 NOTE — PROGRESS NOTE ADULT - ASSESSMENT
pt w/recurrent admission for etoh abuse and gastritis comes w/likely gastritis.    Alcoholic intoxication with complication:  - On Ativan per CIWA protocol  - haldol/phenobarb PRN   - mvi/folate/thaimine  - Encouraged cessation.     Chronic alcoholic gastritis:  - On clear liquid diet, advance as tolerated  - Continue Pepcid/ Carafate     Dyslipidemia:  - Continue statin.   - LFTs ok    ROSA:  - Likely pre renal  - Trial of IVF  - Follow up repeat    Anemia due to poor Po intake:  - Hb drop since last admission  - Normocytic  - Follow up repeat CBC    DVT ppx:  - Start S/Q heparin pt w/recurrent admission for etoh abuse and gastritis comes w/likely gastritis.    Alcoholic intoxication with complication:  - On Ativan per CIWA protocol  - haldol/phenobarb PRN   - mvi/folate/thaimine  - Encouraged cessation.     Acute metabolic encephalopathy  - monitor mental status    Chronic alcoholic gastritis:  - On clear liquid diet, advance as tolerated  - Continue Pepcid/ Carafate     Dyslipidemia:  - Continue statin.   - LFTs ok    ROSA:  - Likely pre renal  - Trial of IVF  - Follow up repeat    Anemia due to poor Po intake:  - Hb drop since last admission  - Normocytic  - Follow up repeat CBC    DVT ppx:  - Start S/Q heparin

## 2020-11-04 NOTE — DISCHARGE NOTE PROVIDER - NSDCCPCAREPLAN_GEN_ALL_CORE_FT
PRINCIPAL DISCHARGE DIAGNOSIS  Diagnosis: Alcoholic intoxication without complication  Assessment and Plan of Treatment:       SECONDARY DISCHARGE DIAGNOSES  Diagnosis: Dyslipidemia  Assessment and Plan of Treatment:     Diagnosis: Lactic acidosis  Assessment and Plan of Treatment:     Diagnosis: Hypokalemia  Assessment and Plan of Treatment:     Diagnosis: Acute metabolic encephalopathy  Assessment and Plan of Treatment:     Diagnosis: Acute alcoholic gastritis without hemorrhage  Assessment and Plan of Treatment:

## 2020-11-04 NOTE — DIETITIAN INITIAL EVALUATION ADULT. - PERTINENT MEDS FT
Lactated Ringers Bolus, Heparin, Lorazepam, Haloperidol, Phenobarbital, Famotidine, Potassium Chloride

## 2020-11-04 NOTE — PROGRESS NOTE ADULT - SUBJECTIVE AND OBJECTIVE BOX
Patient is a 55y old  Male who presents with a chief complaint of abdominal pain (03 Nov 2020 18:10)      OVERNIGHT EVENTS: agitated, code [place] called. spoke w/his son, poc discussed    REVIEW OF SYSTEMS: denies chest pain/SOB, diaphoresis, no F/C, cough, dizziness, headache, blurry vision, nausea, vomiting, abdominal pain. All others review of systems negative     MEDICATIONS  (STANDING):  atorvastatin 20 milliGRAM(s) Oral at bedtime  famotidine    Tablet 20 milliGRAM(s) Oral two times a day  folic acid 1 milliGRAM(s) Oral daily  heparin   Injectable 5000 Unit(s) SubCutaneous every 12 hours  LORazepam   Injectable   IV Push   LORazepam   Injectable 1.5 milliGRAM(s) IV Push every 4 hours  LORazepam   Injectable 1 milliGRAM(s) IV Push every 4 hours  multivitamin 1 Tablet(s) Oral daily  potassium chloride    Tablet ER 10 milliEquivalent(s) Oral daily  sodium chloride 0.9%. 1000 milliLiter(s) (75 mL/Hr) IV Continuous <Continuous>  sucralfate suspension 1 Gram(s) Oral four times a day  thiamine 100 milliGRAM(s) Oral daily    MEDICATIONS  (PRN):  haloperidol    Injectable 4 milliGRAM(s) IV Push every 6 hours PRN Agitation  LORazepam   Injectable 2 milliGRAM(s) IV Push every 1 hour PRN CIWA-Ar score 8 or greater  morphine  - Injectable 2 milliGRAM(s) IV Push every 8 hours PRN Severe Pain (7 - 10)  PHENobarbital Injectable 130 milliGRAM(s) IV Push two times a day PRN ciwa >10      Allergies    No Known Allergies    Intolerances        T(F): 98 (11-04-20 @ 10:53), Max: 99.1 (11-03-20 @ 12:55)  HR: 98 (11-04-20 @ 10:53) (89 - 103)  BP: 153/- (11-04-20 @ 10:53) (127/70 - 153/-)  RR: 17 (11-04-20 @ 10:53) (17 - 18)  SpO2: 96% (11-04-20 @ 10:53) (96% - 100%)  Wt(kg): --    PHYSICAL EXAM:  GENERAL: NAD  HEAD:  Atraumatic, Normocephalic  EYES: PERRLA, conjunctiva and sclera clear  ENMT: Moist mucous membranes  NECK: Supple, No JVD, Normal thyroid  NERVOUS SYSTEM:  Alert & Awake  CHEST/LUNG: Clear to percussion bilaterally;   HEART: Regular rate and rhythm;   ABDOMEN: Soft, Nontender, Nondistended; Bowel sounds present  EXTREMITIES:  no edema BL LE  SKIN: moist    LABS:                        10.5   3.89  )-----------( 179      ( 04 Nov 2020 06:38 )             32.3     11-04    136  |  102  |  5<L>  ----------------------------<  108<H>  3.5   |  28  |  0.74    Ca    8.8      04 Nov 2020 06:38  Mg     2.2     11-02    TPro  7.1  /  Alb  3.2<L>  /  TBili  0.9  /  DBili  x   /  AST  29  /  ALT  23  /  AlkPhos  48  11-04        Cultures;   CAPILLARY BLOOD GLUCOSE        Lipid panel:     CARDIAC MARKERS ( 02 Nov 2020 16:16 )  <.015 ng/mL / x     / x     / x     / 1.0 ng/mL        RADIOLOGY & ADDITIONAL TESTS:    Imaging Personally Reviewed:  [x ] YES      Consultant(s) Notes Reviewed:  [x ] YES     Care Discussed with [x ] Consultants [X ] Patient [ ] Family  [x ]    [x ]  Other; RN

## 2020-11-04 NOTE — BEHAVIORAL HEALTH ASSESSMENT NOTE - RISK ASSESSMENT
Low Acute Suicide Risk Chronic risk factors: single, male gender, continuous/chronic heavy alcohol abuse with limited insight and lack of motivation to address it. Protective factors: young; no formal psych hx; no known hx of suicide attempts or self-injurious behavior; no hx of aggression/violence; stable domicile, engaged with his job; denies illicit drug use; denies access to guns; access to health services. No acute risk factors identified.

## 2020-11-04 NOTE — DISCHARGE NOTE PROVIDER - NSDCMRMEDTOKEN_GEN_ALL_CORE_FT
atorvastatin 20 mg oral tablet: 1 tab(s) orally once a day (at bedtime)  folic acid 1 mg oral tablet: 1 tab(s) orally once a day  Multiple Vitamins oral tablet: 1 tab(s) orally once a day  pantoprazole 20 mg oral delayed release tablet: 1 tab(s) orally once a day  thiamine 100 mg oral tablet: 1 tab(s) orally once a day

## 2020-11-04 NOTE — BEHAVIORAL HEALTH ASSESSMENT NOTE - SUMMARY
- presented with "intoxication" and not withdrawal. He was still intoxicated on the day of ED presentation. Severe withdrawal symptoms usually occur 2-3 days after last drink   - vitals have been stable thus far, no clinical signs of severe withdrawal sxs   - MSE is at baseline at this time   - Patient has capacity to leave AMA

## 2020-11-04 NOTE — DIETITIAN INITIAL EVALUATION ADULT. - CALCULATED FROM (ML/KG)
Writer attempted to contact [facility] regarding Percocet prescription, writer was able to speak with patient's daughter Kellie.  Kellie reports patient is taking the Percocet 1 tablet every 2-3 hours and occasionally takes 2 tabs at a time.  Kellie reports patient has less than 3 days left of the Percocet.  Provider please address patient's Percocet refill request.   2013

## 2020-11-04 NOTE — BEHAVIORAL HEALTH ASSESSMENT NOTE - NSBHCONSULTFOLLOWAFTERCARE_PSY_A_CORE FT
Patient declined outpatient referrals for alcohol abuse services Patient declined outpatient referrals for alcohol abuse services  - does not need RX for home; Patient resumes alcohol consumption the day of or very soon post discharge thereby self-medicating for any potential withdrawal issues   - risks/benefits/symtoms of withdrawal to look out for discussed with Patient including returning to ED/calling 911 If feeling worst

## 2020-11-04 NOTE — BEHAVIORAL HEALTH ASSESSMENT NOTE - NSBHCHARTREVIEWVS_PSY_A_CORE FT
T(C): 36.7 (11-04-20 @ 10:53), Max: 37.3 (11-03-20 @ 17:06)  HR: 98 (11-04-20 @ 10:53) (89 - 103)  BP: 153/- (11-04-20 @ 10:53) (137/77 - 153/-)  RR: 17 (11-04-20 @ 10:53) (17 - 18)  SpO2: 96% (11-04-20 @ 10:53) (96% - 100%)

## 2020-11-04 NOTE — BEHAVIORAL HEALTH ASSESSMENT NOTE - SUICIDE PROTECTIVE FACTORS
Responsibility to family and others/Identifies reasons for living/Has future plans/Supportive social network of family or friends/Restoration beliefs/Fear of death or the actual act of killing self/Cultural, spiritual and/or moral attitudes against suicide/Engaged in work or school

## 2020-11-04 NOTE — DIETITIAN INITIAL EVALUATION ADULT. - OTHER INFO
Pt adm c alcoholic intoxication; vomiting/pain upon adm. Pt c hx of previous adm due to the same dx. Pt seemed confused; denied RD's assessment and answered questions in a language other than English until he confirmed he did not want to be seen at this time. Per previous RD notes, pt UBW= 75.2kg (9/11/20); wt remains stable. Pt seen for Food Insecurity consult; pt does not qualify as he is returning home and being cared for by family. Pt adm c alcoholic intoxication; vomiting/pain upon adm. Pt c hx of numerous previous adm due to the same dx. Pt seemed confused; denied RD's assessment and answered questions in a language other than English until he confirmed he did not want to be seen at this time. Per previous RD notes, pt UBW= 75.2kg (9/11/20); wt remains stable. Pt seen for Food Insecurity consult; pt does not have qualifying dx for food is health program, returning home to family.

## 2020-11-04 NOTE — DISCHARGE NOTE NURSING/CASE MANAGEMENT/SOCIAL WORK - PATIENT PORTAL LINK FT
You can access the FollowMyHealth Patient Portal offered by [facility] by registering at the following website: [email or web address]. By joining [employer]’s FollowMyHealth portal, you will also be able to view your health information using other applications (apps) compatible with our system.

## 2020-11-04 NOTE — DIETITIAN INITIAL EVALUATION ADULT. - PROBLEM SELECTOR PROBLEM 3
Dyslipidemia Location Indication Override (Is Already Calculated Based On Selected Body Location): Area M

## 2020-11-04 NOTE — DISCHARGE NOTE PROVIDER - HOSPITAL COURSE
55M with a PMH of chronic ETOH abuse with hx of alcohol withdrawal with frequent hospital admissions, alcoholic gastritis/esophagitis, PUD, HLD, presented with abdominal pain, nausea, and vomiting.        ·  Problem: Chronic alcoholic gastritis without hemorrhage.  Plan: Continue PPI.  No s/s of bleeding.  Amylase and Lipase wnl.  Asymptomatic.  H/H stable  ·  Problem: Alcohol withdrawal syndrome without complication.  Pt with extensive h/o of significant withdrawal.  Observe on CIWA protocol. Counselled on cessation. Responding to therapy.   ·  Problem: Lactic acid acidosis.  Plan: Lactate normalized.  ·  Problem: Dyslipidemia.  Plan: simvastatin.   Stable for d/c and follow up w/pcp

## 2020-11-06 NOTE — ED ADULT NURSE NOTE - ED STAT RN HANDOFF DETAILS
Geoff Benjamin (Self) [phone] (H)     Pt is requesting a call back regarding my chart mess she has been having regarding her meds. She is now very confused and would like to talk to someone. receiving report from covering RN brijesh. no acute distress noted, will continue to monitor. pt. receiving fluids at this time, repeat lactate needed. no complaints from pt. at this time. pt presents with one 20 gauge IV to the left foot receiving NS. receiving report from covering RN brijesh. no acute distress noted, will continue to monitor. pt. receiving fluids at this time, repeat lactate needed. no complaints from pt. at this time. pt presents with one 20 gauge IV to the Right foot receiving NS.

## 2020-11-10 ENCOUNTER — EMERGENCY (EMERGENCY)
Facility: HOSPITAL | Age: 55
LOS: 0 days | Discharge: HOME | End: 2020-11-11
Attending: EMERGENCY MEDICINE
Payer: MEDICAID

## 2020-11-10 VITALS
WEIGHT: 154.1 LBS | HEART RATE: 108 BPM | OXYGEN SATURATION: 97 % | SYSTOLIC BLOOD PRESSURE: 135 MMHG | DIASTOLIC BLOOD PRESSURE: 91 MMHG | RESPIRATION RATE: 16 BRPM | TEMPERATURE: 98 F | HEIGHT: 67 IN

## 2020-11-10 DIAGNOSIS — E78.2 MIXED HYPERLIPIDEMIA: ICD-10-CM

## 2020-11-10 DIAGNOSIS — F10.10 ALCOHOL ABUSE, UNCOMPLICATED: ICD-10-CM

## 2020-11-10 DIAGNOSIS — K29.20 ALCOHOLIC GASTRITIS WITHOUT BLEEDING: ICD-10-CM

## 2020-11-10 DIAGNOSIS — F10.231 ALCOHOL DEPENDENCE WITH WITHDRAWAL DELIRIUM: ICD-10-CM

## 2020-11-10 DIAGNOSIS — F19.10 OTHER PSYCHOACTIVE SUBSTANCE ABUSE, UNCOMPLICATED: ICD-10-CM

## 2020-11-10 DIAGNOSIS — F10.20 ALCOHOL DEPENDENCE, UNCOMPLICATED: ICD-10-CM

## 2020-11-10 DIAGNOSIS — R10.9 UNSPECIFIED ABDOMINAL PAIN: ICD-10-CM

## 2020-11-10 LAB
ALBUMIN SERPL ELPH-MCNC: 3.3 G/DL — SIGNIFICANT CHANGE UP (ref 3.3–5)
ALP SERPL-CCNC: 44 U/L — SIGNIFICANT CHANGE UP (ref 40–120)
ALT FLD-CCNC: 84 U/L — HIGH (ref 12–78)
ANION GAP SERPL CALC-SCNC: 10 MMOL/L — SIGNIFICANT CHANGE UP (ref 5–17)
ANISOCYTOSIS BLD QL: SLIGHT — SIGNIFICANT CHANGE UP
AST SERPL-CCNC: 126 U/L — HIGH (ref 15–37)
BASOPHILS # BLD AUTO: 0.04 K/UL — SIGNIFICANT CHANGE UP (ref 0–0.2)
BASOPHILS NFR BLD AUTO: 1.3 % — SIGNIFICANT CHANGE UP (ref 0–2)
BILIRUB SERPL-MCNC: 0.2 MG/DL — SIGNIFICANT CHANGE UP (ref 0.2–1.2)
BUN SERPL-MCNC: 13 MG/DL — SIGNIFICANT CHANGE UP (ref 7–23)
CALCIUM SERPL-MCNC: 8.4 MG/DL — LOW (ref 8.5–10.1)
CHLORIDE SERPL-SCNC: 106 MMOL/L — SIGNIFICANT CHANGE UP (ref 96–108)
CO2 SERPL-SCNC: 25 MMOL/L — SIGNIFICANT CHANGE UP (ref 22–31)
CREAT SERPL-MCNC: 0.76 MG/DL — SIGNIFICANT CHANGE UP (ref 0.5–1.3)
EOSINOPHIL # BLD AUTO: 0.06 K/UL — SIGNIFICANT CHANGE UP (ref 0–0.5)
EOSINOPHIL NFR BLD AUTO: 1.9 % — SIGNIFICANT CHANGE UP (ref 0–6)
ETHANOL SERPL-MCNC: 288 MG/DL — HIGH (ref 0–10)
GLUCOSE SERPL-MCNC: 77 MG/DL — SIGNIFICANT CHANGE UP (ref 70–99)
HCT VFR BLD CALC: 37.2 % — LOW (ref 39–50)
HGB BLD-MCNC: 12.1 G/DL — LOW (ref 13–17)
IMM GRANULOCYTES NFR BLD AUTO: 0.3 % — SIGNIFICANT CHANGE UP (ref 0–1.5)
LIDOCAIN IGE QN: 330 U/L — SIGNIFICANT CHANGE UP (ref 73–393)
LYMPHOCYTES # BLD AUTO: 1.69 K/UL — SIGNIFICANT CHANGE UP (ref 1–3.3)
LYMPHOCYTES # BLD AUTO: 53 % — HIGH (ref 13–44)
MANUAL SMEAR VERIFICATION: YES — SIGNIFICANT CHANGE UP
MCHC RBC-ENTMCNC: 27.7 PG — SIGNIFICANT CHANGE UP (ref 27–34)
MCHC RBC-ENTMCNC: 32.5 GM/DL — SIGNIFICANT CHANGE UP (ref 32–36)
MCV RBC AUTO: 85.1 FL — SIGNIFICANT CHANGE UP (ref 80–100)
MONOCYTES # BLD AUTO: 0.29 K/UL — SIGNIFICANT CHANGE UP (ref 0–0.9)
MONOCYTES NFR BLD AUTO: 9.1 % — SIGNIFICANT CHANGE UP (ref 2–14)
NEUTROPHILS # BLD AUTO: 1.1 K/UL — LOW (ref 1.8–7.4)
NEUTROPHILS NFR BLD AUTO: 34.4 % — LOW (ref 43–77)
NRBC # BLD: 0 /100 WBCS — SIGNIFICANT CHANGE UP (ref 0–0)
PLAT MORPH BLD: NORMAL — SIGNIFICANT CHANGE UP
PLATELET # BLD AUTO: 271 K/UL — SIGNIFICANT CHANGE UP (ref 150–400)
POTASSIUM SERPL-MCNC: 3.6 MMOL/L — SIGNIFICANT CHANGE UP (ref 3.5–5.3)
POTASSIUM SERPL-SCNC: 3.6 MMOL/L — SIGNIFICANT CHANGE UP (ref 3.5–5.3)
PROT SERPL-MCNC: 7.1 GM/DL — SIGNIFICANT CHANGE UP (ref 6–8.3)
RBC # BLD: 4.37 M/UL — SIGNIFICANT CHANGE UP (ref 4.2–5.8)
RBC # FLD: 15.7 % — HIGH (ref 10.3–14.5)
RBC BLD AUTO: NORMAL — SIGNIFICANT CHANGE UP
SODIUM SERPL-SCNC: 141 MMOL/L — SIGNIFICANT CHANGE UP (ref 135–145)
WBC # BLD: 3.19 K/UL — LOW (ref 3.8–10.5)
WBC # FLD AUTO: 3.19 K/UL — LOW (ref 3.8–10.5)

## 2020-11-10 PROCEDURE — 73130 X-RAY EXAM OF HAND: CPT | Mod: 26,LT,RT

## 2020-11-10 PROCEDURE — 99284 EMERGENCY DEPT VISIT MOD MDM: CPT

## 2020-11-10 PROCEDURE — 93010 ELECTROCARDIOGRAM REPORT: CPT

## 2020-11-10 RX ORDER — KETOROLAC TROMETHAMINE 30 MG/ML
15 SYRINGE (ML) INJECTION ONCE
Refills: 0 | Status: DISCONTINUED | OUTPATIENT
Start: 2020-11-10 | End: 2020-11-10

## 2020-11-10 RX ORDER — FAMOTIDINE 10 MG/ML
20 INJECTION INTRAVENOUS ONCE
Refills: 0 | Status: COMPLETED | OUTPATIENT
Start: 2020-11-10 | End: 2020-11-10

## 2020-11-10 RX ORDER — SODIUM CHLORIDE 9 MG/ML
1000 INJECTION INTRAMUSCULAR; INTRAVENOUS; SUBCUTANEOUS ONCE
Refills: 0 | Status: COMPLETED | OUTPATIENT
Start: 2020-11-10 | End: 2020-11-10

## 2020-11-10 RX ORDER — ONDANSETRON 8 MG/1
4 TABLET, FILM COATED ORAL ONCE
Refills: 0 | Status: COMPLETED | OUTPATIENT
Start: 2020-11-10 | End: 2020-11-10

## 2020-11-10 RX ADMIN — ONDANSETRON 4 MILLIGRAM(S): 8 TABLET, FILM COATED ORAL at 18:05

## 2020-11-10 RX ADMIN — SODIUM CHLORIDE 1000 MILLILITER(S): 9 INJECTION INTRAMUSCULAR; INTRAVENOUS; SUBCUTANEOUS at 18:05

## 2020-11-10 RX ADMIN — FAMOTIDINE 20 MILLIGRAM(S): 10 INJECTION INTRAVENOUS at 18:09

## 2020-11-10 RX ADMIN — Medication 15 MILLIGRAM(S): at 20:40

## 2020-11-10 RX ADMIN — Medication 10 MILLILITER(S): at 22:16

## 2020-11-10 RX ADMIN — Medication 15 MILLIGRAM(S): at 22:40

## 2020-11-10 NOTE — ED PROVIDER NOTE - CLINICAL SUMMARY MEDICAL DECISION MAKING FREE TEXT BOX
nausea and vomiting after drinking alcohol; hx alcohol abuse; labs, IVF  also with hand swelling, no signs of infection; will xray

## 2020-11-10 NOTE — ED ADULT TRIAGE NOTE - CHIEF COMPLAINT QUOTE
per EMS family states pt have been drinking all day, patient denies. Pt c/o abdominal pain with nausea and vomiting. pt c/o pain to hands with swelling noted

## 2020-11-10 NOTE — ED PROVIDER NOTE - CARE PLAN
Principal Discharge DX:	Alcohol abuse   Principal Discharge DX:	Alcohol abuse  Secondary Diagnosis:	Chronic alcoholic gastritis without hemorrhage

## 2020-11-10 NOTE — ED PROVIDER NOTE - PROGRESS NOTE DETAILS
patient walking around ED asking for pain meds. abd soft nd nt no rigidity or rebound. will CT ap and re-eval. s/p 100mg librium for likely etoh withdrawal. now improved, no tremors. The patient is clinically sober. The patient is alert and oriented x 3, is clear and coherent in conversation and has a normal gait and shows no signs of acute intoxication. The patient is safe for discharge. ct neg, to be [place] home.

## 2020-11-10 NOTE — ED PROVIDER NOTE - OBJECTIVE STATEMENT
56 yo male with hx alcohol abuse, gastritis, c/o upper abdominal pain and vomiting after drinking alcohol today  denies chest pain or sob  denies fever or chills  patient also c/o swelling to b/l hands  denies fall or trauma

## 2020-11-10 NOTE — ED PROVIDER NOTE - PATIENT PORTAL LINK FT
You can access the FollowMyHealth Patient Portal offered by [facility] by registering at the following website: [email or web address]. By joining [employer]’s FollowMyHealth portal, you will also be able to view your health information using other applications (apps) compatible with our system.

## 2020-11-11 VITALS
TEMPERATURE: 97 F | OXYGEN SATURATION: 96 % | SYSTOLIC BLOOD PRESSURE: 117 MMHG | DIASTOLIC BLOOD PRESSURE: 72 MMHG | RESPIRATION RATE: 18 BRPM | HEART RATE: 87 BPM
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
## 2020-11-11 DIAGNOSIS — E87.2 ACIDOSIS: ICD-10-CM

## 2020-11-11 DIAGNOSIS — E78.2 MIXED HYPERLIPIDEMIA: ICD-10-CM

## 2020-11-11 DIAGNOSIS — R10.9 UNSPECIFIED ABDOMINAL PAIN: ICD-10-CM

## 2020-11-11 DIAGNOSIS — F10.20 ALCOHOL DEPENDENCE, UNCOMPLICATED: ICD-10-CM

## 2020-11-11 DIAGNOSIS — K29.20 ALCOHOLIC GASTRITIS WITHOUT BLEEDING: ICD-10-CM

## 2020-11-11 DIAGNOSIS — D64.9 ANEMIA, UNSPECIFIED: ICD-10-CM

## 2020-11-11 DIAGNOSIS — E87.6 HYPOKALEMIA: ICD-10-CM

## 2020-11-11 DIAGNOSIS — N17.9 ACUTE KIDNEY FAILURE, UNSPECIFIED: ICD-10-CM

## 2020-11-11 DIAGNOSIS — E44.0 MODERATE PROTEIN-CALORIE MALNUTRITION: ICD-10-CM

## 2020-11-11 DIAGNOSIS — G93.41 METABOLIC ENCEPHALOPATHY: ICD-10-CM

## 2020-11-11 PROCEDURE — 74177 CT ABD & PELVIS W/CONTRAST: CPT | Mod: 26,MA

## 2020-11-11 RX ORDER — SUCRALFATE 1 G
1 TABLET ORAL ONCE
Refills: 0 | Status: COMPLETED | OUTPATIENT
Start: 2020-11-11 | End: 2020-11-11

## 2020-11-11 RX ADMIN — Medication 10 MILLILITER(S): at 05:38

## 2020-11-11 RX ADMIN — Medication 15 MILLIGRAM(S): at 05:38

## 2020-11-11 RX ADMIN — Medication 30 MILLILITER(S): at 03:50

## 2020-11-11 RX ADMIN — Medication 50 MILLIGRAM(S): at 03:50

## 2020-11-11 RX ADMIN — Medication 1 GRAM(S): at 05:36

## 2020-11-11 RX ADMIN — Medication 50 MILLIGRAM(S): at 02:53

## 2020-11-15 ENCOUNTER — INPATIENT (INPATIENT)
Facility: HOSPITAL | Age: 55
LOS: 3 days | Discharge: HOME | End: 2020-11-19
Attending: INTERNAL MEDICINE | Admitting: INTERNAL MEDICINE
Payer: MEDICAID

## 2020-11-15 VITALS
TEMPERATURE: 98 F | DIASTOLIC BLOOD PRESSURE: 85 MMHG | SYSTOLIC BLOOD PRESSURE: 135 MMHG | WEIGHT: 169.98 LBS | OXYGEN SATURATION: 98 % | HEIGHT: 67 IN | RESPIRATION RATE: 18 BRPM | HEART RATE: 112 BPM

## 2020-11-15 DIAGNOSIS — F10.231 ALCOHOL DEPENDENCE WITH WITHDRAWAL DELIRIUM: ICD-10-CM
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
## 2020-11-15 DIAGNOSIS — F10.230 ALCOHOL DEPENDENCE WITH WITHDRAWAL, UNCOMPLICATED: ICD-10-CM

## 2020-11-15 DIAGNOSIS — K29.20 ALCOHOLIC GASTRITIS WITHOUT BLEEDING: ICD-10-CM

## 2020-11-15 DIAGNOSIS — K27.9 PEPTIC ULCER, SITE UNSPECIFIED, UNSPECIFIED AS ACUTE OR CHRONIC, WITHOUT HEMORRHAGE OR PERFORATION: ICD-10-CM

## 2020-11-15 DIAGNOSIS — E78.2 MIXED HYPERLIPIDEMIA: ICD-10-CM

## 2020-11-15 LAB
ALBUMIN SERPL ELPH-MCNC: 3.8 G/DL — SIGNIFICANT CHANGE UP (ref 3.3–5)
ALP SERPL-CCNC: 52 U/L — SIGNIFICANT CHANGE UP (ref 40–120)
ALT FLD-CCNC: 89 U/L — HIGH (ref 12–78)
ANION GAP SERPL CALC-SCNC: 17 MMOL/L — SIGNIFICANT CHANGE UP (ref 5–17)
AST SERPL-CCNC: 107 U/L — HIGH (ref 15–37)
BASOPHILS # BLD AUTO: 0.06 K/UL — SIGNIFICANT CHANGE UP (ref 0–0.2)
BASOPHILS NFR BLD AUTO: 1 % — SIGNIFICANT CHANGE UP (ref 0–2)
BILIRUB SERPL-MCNC: 0.3 MG/DL — SIGNIFICANT CHANGE UP (ref 0.2–1.2)
BUN SERPL-MCNC: 17 MG/DL — SIGNIFICANT CHANGE UP (ref 7–23)
CALCIUM SERPL-MCNC: 9.2 MG/DL — SIGNIFICANT CHANGE UP (ref 8.5–10.1)
CHLORIDE SERPL-SCNC: 98 MMOL/L — SIGNIFICANT CHANGE UP (ref 96–108)
CO2 SERPL-SCNC: 25 MMOL/L — SIGNIFICANT CHANGE UP (ref 22–31)
CREAT SERPL-MCNC: 0.92 MG/DL — SIGNIFICANT CHANGE UP (ref 0.5–1.3)
EOSINOPHIL # BLD AUTO: 0.01 K/UL — SIGNIFICANT CHANGE UP (ref 0–0.5)
EOSINOPHIL NFR BLD AUTO: 0.2 % — SIGNIFICANT CHANGE UP (ref 0–6)
GLUCOSE SERPL-MCNC: 82 MG/DL — SIGNIFICANT CHANGE UP (ref 70–99)
HCT VFR BLD CALC: 39.4 % — SIGNIFICANT CHANGE UP (ref 39–50)
HGB BLD-MCNC: 13.1 G/DL — SIGNIFICANT CHANGE UP (ref 13–17)
IMM GRANULOCYTES NFR BLD AUTO: 0.2 % — SIGNIFICANT CHANGE UP (ref 0–1.5)
LYMPHOCYTES # BLD AUTO: 1.47 K/UL — SIGNIFICANT CHANGE UP (ref 1–3.3)
LYMPHOCYTES # BLD AUTO: 25.2 % — SIGNIFICANT CHANGE UP (ref 13–44)
MAGNESIUM SERPL-MCNC: 2.1 MG/DL — SIGNIFICANT CHANGE UP (ref 1.6–2.6)
MCHC RBC-ENTMCNC: 27.5 PG — SIGNIFICANT CHANGE UP (ref 27–34)
MCHC RBC-ENTMCNC: 33.2 GM/DL — SIGNIFICANT CHANGE UP (ref 32–36)
MCV RBC AUTO: 82.8 FL — SIGNIFICANT CHANGE UP (ref 80–100)
MONOCYTES # BLD AUTO: 0.22 K/UL — SIGNIFICANT CHANGE UP (ref 0–0.9)
MONOCYTES NFR BLD AUTO: 3.8 % — SIGNIFICANT CHANGE UP (ref 2–14)
NEUTROPHILS # BLD AUTO: 4.07 K/UL — SIGNIFICANT CHANGE UP (ref 1.8–7.4)
NEUTROPHILS NFR BLD AUTO: 69.6 % — SIGNIFICANT CHANGE UP (ref 43–77)
NRBC # BLD: 0 /100 WBCS — SIGNIFICANT CHANGE UP (ref 0–0)
PHOSPHATE SERPL-MCNC: 3.6 MG/DL — SIGNIFICANT CHANGE UP (ref 2.5–4.5)
PLATELET # BLD AUTO: 222 K/UL — SIGNIFICANT CHANGE UP (ref 150–400)
POTASSIUM SERPL-MCNC: 3.8 MMOL/L — SIGNIFICANT CHANGE UP (ref 3.5–5.3)
POTASSIUM SERPL-SCNC: 3.8 MMOL/L — SIGNIFICANT CHANGE UP (ref 3.5–5.3)
PROT SERPL-MCNC: 8.2 GM/DL — SIGNIFICANT CHANGE UP (ref 6–8.3)
RBC # BLD: 4.76 M/UL — SIGNIFICANT CHANGE UP (ref 4.2–5.8)
RBC # FLD: 15.6 % — HIGH (ref 10.3–14.5)
SODIUM SERPL-SCNC: 140 MMOL/L — SIGNIFICANT CHANGE UP (ref 135–145)
WBC # BLD: 5.84 K/UL — SIGNIFICANT CHANGE UP (ref 3.8–10.5)
WBC # FLD AUTO: 5.84 K/UL — SIGNIFICANT CHANGE UP (ref 3.8–10.5)

## 2020-11-15 PROCEDURE — 99285 EMERGENCY DEPT VISIT HI MDM: CPT

## 2020-11-15 RX ORDER — FAMOTIDINE 10 MG/ML
20 INJECTION INTRAVENOUS ONCE
Refills: 0 | Status: COMPLETED | OUTPATIENT
Start: 2020-11-15 | End: 2020-11-15

## 2020-11-15 RX ORDER — FOLIC ACID 0.8 MG
1 TABLET ORAL ONCE
Refills: 0 | Status: COMPLETED | OUTPATIENT
Start: 2020-11-15 | End: 2020-11-15

## 2020-11-15 RX ORDER — LIDOCAINE 4 G/100G
15 CREAM TOPICAL ONCE
Refills: 0 | Status: COMPLETED | OUTPATIENT
Start: 2020-11-15 | End: 2020-11-15

## 2020-11-15 RX ORDER — THIAMINE MONONITRATE (VIT B1) 100 MG
100 TABLET ORAL ONCE
Refills: 0 | Status: COMPLETED | OUTPATIENT
Start: 2020-11-15 | End: 2020-11-15

## 2020-11-15 RX ORDER — SODIUM CHLORIDE 9 MG/ML
1000 INJECTION, SOLUTION INTRAVENOUS
Refills: 0 | Status: DISCONTINUED | OUTPATIENT
Start: 2020-11-15 | End: 2020-11-16

## 2020-11-15 RX ORDER — ONDANSETRON 8 MG/1
4 TABLET, FILM COATED ORAL ONCE
Refills: 0 | Status: COMPLETED | OUTPATIENT
Start: 2020-11-15 | End: 2020-11-15

## 2020-11-15 RX ORDER — HALOPERIDOL DECANOATE 100 MG/ML
2.5 INJECTION INTRAMUSCULAR ONCE
Refills: 0 | Status: COMPLETED | OUTPATIENT
Start: 2020-11-15 | End: 2020-11-15

## 2020-11-15 RX ADMIN — Medication 20 MILLIGRAM(S): at 22:45

## 2020-11-15 RX ADMIN — Medication 30 MILLILITER(S): at 21:09

## 2020-11-15 RX ADMIN — Medication 100 MILLIGRAM(S): at 21:09

## 2020-11-15 RX ADMIN — SODIUM CHLORIDE 150 MILLILITER(S): 9 INJECTION, SOLUTION INTRAVENOUS at 20:28

## 2020-11-15 RX ADMIN — ONDANSETRON 4 MILLIGRAM(S): 8 TABLET, FILM COATED ORAL at 20:32

## 2020-11-15 RX ADMIN — LIDOCAINE 15 MILLILITER(S): 4 CREAM TOPICAL at 22:47

## 2020-11-15 RX ADMIN — FAMOTIDINE 20 MILLIGRAM(S): 10 INJECTION INTRAVENOUS at 20:33

## 2020-11-15 NOTE — ED ADULT NURSE REASSESSMENT NOTE - ED CARDIAC HEART SOUNDS
normal S1, S2 heard
Pt instructed to hold metformin 5/9 and 5/10.  OR booking notified of pt's diabetes status via fax.

## 2020-11-15 NOTE — ED ADULT NURSE NOTE - OBJECTIVE STATEMENT
A&Ox3. pt came to ED to detox. pt c/o generalized abdominal pain, vomiting. pt denies falls/injuries. pt denies headache/dizziness/sob/cp.

## 2020-11-15 NOTE — ED PROVIDER NOTE - CLINICAL SUMMARY MEDICAL DECISION MAKING FREE TEXT BOX
55M well known to the department for presenting for etoh detox, uncomfortable and upset appearing, + slight tremors in b/l UEs. Slight tongue fasiculations, no signs of trauma. Pt AO x3 with slight tachycardia on triage vitals. Librium for withdrawal ppx. Check basics, rehydrate, reassess. No focal abdominal ttp, pt s/p recent negative CTAP.

## 2020-11-15 NOTE — ED PROVIDER NOTE - OBJECTIVE STATEMENT
55M hx etoh abuse c/o "im shaking" and "I have too much pain" while pointing to his abdomen. Pt denies having had any etoh today but endorses drinking "a large bottle" yesterday. Pt not elaborating on what the large bottle contained, even when prompted with "wine? beer? liquor" by me during interview. Also endorses nausea but denies cp, sob, fevers, cough, illicit drug use, SI, HI.

## 2020-11-16 DIAGNOSIS — F10.230 ALCOHOL DEPENDENCE WITH WITHDRAWAL, UNCOMPLICATED: ICD-10-CM

## 2020-11-16 DIAGNOSIS — K29.20 ALCOHOLIC GASTRITIS WITHOUT BLEEDING: ICD-10-CM

## 2020-11-16 DIAGNOSIS — E78.2 MIXED HYPERLIPIDEMIA: ICD-10-CM

## 2020-11-16 DIAGNOSIS — Z29.9 ENCOUNTER FOR PROPHYLACTIC MEASURES, UNSPECIFIED: ICD-10-CM

## 2020-11-16 LAB
ALBUMIN SERPL ELPH-MCNC: 3.1 G/DL — LOW (ref 3.3–5)
ALP SERPL-CCNC: 44 U/L — SIGNIFICANT CHANGE UP (ref 40–120)
ALT FLD-CCNC: 71 U/L — SIGNIFICANT CHANGE UP (ref 12–78)
ANION GAP SERPL CALC-SCNC: 11 MMOL/L — SIGNIFICANT CHANGE UP (ref 5–17)
AST SERPL-CCNC: 86 U/L — HIGH (ref 15–37)
BILIRUB DIRECT SERPL-MCNC: 0.17 MG/DL — SIGNIFICANT CHANGE UP (ref 0.05–0.2)
BILIRUB INDIRECT FLD-MCNC: 0.4 MG/DL — SIGNIFICANT CHANGE UP (ref 0.2–1)
BILIRUB SERPL-MCNC: 0.6 MG/DL — SIGNIFICANT CHANGE UP (ref 0.2–1.2)
BUN SERPL-MCNC: 15 MG/DL — SIGNIFICANT CHANGE UP (ref 7–23)
CALCIUM SERPL-MCNC: 8.1 MG/DL — LOW (ref 8.5–10.1)
CHLORIDE SERPL-SCNC: 103 MMOL/L — SIGNIFICANT CHANGE UP (ref 96–108)
CO2 SERPL-SCNC: 29 MMOL/L — SIGNIFICANT CHANGE UP (ref 22–31)
CREAT SERPL-MCNC: 0.88 MG/DL — SIGNIFICANT CHANGE UP (ref 0.5–1.3)
ETHANOL SERPL-MCNC: 329 MG/DL — HIGH (ref 0–10)
GLUCOSE SERPL-MCNC: 58 MG/DL — LOW (ref 70–99)
HCT VFR BLD CALC: 32.7 % — LOW (ref 39–50)
HGB BLD-MCNC: 10.6 G/DL — LOW (ref 13–17)
LIDOCAIN IGE QN: 255 U/L — SIGNIFICANT CHANGE UP (ref 73–393)
MAGNESIUM SERPL-MCNC: 1.8 MG/DL — SIGNIFICANT CHANGE UP (ref 1.6–2.6)
MCHC RBC-ENTMCNC: 27.5 PG — SIGNIFICANT CHANGE UP (ref 27–34)
MCHC RBC-ENTMCNC: 32.4 GM/DL — SIGNIFICANT CHANGE UP (ref 32–36)
MCV RBC AUTO: 84.9 FL — SIGNIFICANT CHANGE UP (ref 80–100)
NRBC # BLD: 0 /100 WBCS — SIGNIFICANT CHANGE UP (ref 0–0)
PHOSPHATE SERPL-MCNC: 2.4 MG/DL — LOW (ref 2.5–4.5)
PLATELET # BLD AUTO: 156 K/UL — SIGNIFICANT CHANGE UP (ref 150–400)
POTASSIUM SERPL-MCNC: 3.4 MMOL/L — LOW (ref 3.5–5.3)
POTASSIUM SERPL-SCNC: 3.4 MMOL/L — LOW (ref 3.5–5.3)
PROT SERPL-MCNC: 6.7 GM/DL — SIGNIFICANT CHANGE UP (ref 6–8.3)
RBC # BLD: 3.85 M/UL — LOW (ref 4.2–5.8)
RBC # FLD: 15.6 % — HIGH (ref 10.3–14.5)
SARS-COV-2 IGG SERPL QL IA: NEGATIVE — SIGNIFICANT CHANGE UP
SARS-COV-2 IGM SERPL IA-ACNC: <0.1 INDEX — SIGNIFICANT CHANGE UP
SARS-COV-2 RNA SPEC QL NAA+PROBE: SIGNIFICANT CHANGE UP
SODIUM SERPL-SCNC: 143 MMOL/L — SIGNIFICANT CHANGE UP (ref 135–145)
WBC # BLD: 7.52 K/UL — SIGNIFICANT CHANGE UP (ref 3.8–10.5)
WBC # FLD AUTO: 7.52 K/UL — SIGNIFICANT CHANGE UP (ref 3.8–10.5)

## 2020-11-16 PROCEDURE — 99222 1ST HOSP IP/OBS MODERATE 55: CPT

## 2020-11-16 PROCEDURE — 36556 INSERT NON-TUNNEL CV CATH: CPT

## 2020-11-16 PROCEDURE — 93010 ELECTROCARDIOGRAM REPORT: CPT

## 2020-11-16 RX ORDER — ONDANSETRON 8 MG/1
4 TABLET, FILM COATED ORAL EVERY 6 HOURS
Refills: 0 | Status: DISCONTINUED | OUTPATIENT
Start: 2020-11-16 | End: 2020-11-19

## 2020-11-16 RX ORDER — FOLIC ACID 0.8 MG
1 TABLET ORAL DAILY
Refills: 0 | Status: DISCONTINUED | OUTPATIENT
Start: 2020-11-16 | End: 2020-11-19

## 2020-11-16 RX ORDER — ATORVASTATIN CALCIUM 80 MG/1
20 TABLET, FILM COATED ORAL AT BEDTIME
Refills: 0 | Status: DISCONTINUED | OUTPATIENT
Start: 2020-11-16 | End: 2020-11-19

## 2020-11-16 RX ORDER — ENOXAPARIN SODIUM 100 MG/ML
40 INJECTION SUBCUTANEOUS DAILY
Refills: 0 | Status: DISCONTINUED | OUTPATIENT
Start: 2020-11-16 | End: 2020-11-16

## 2020-11-16 RX ORDER — PANTOPRAZOLE SODIUM 20 MG/1
40 TABLET, DELAYED RELEASE ORAL
Refills: 0 | Status: DISCONTINUED | OUTPATIENT
Start: 2020-11-16 | End: 2020-11-16

## 2020-11-16 RX ORDER — PANTOPRAZOLE SODIUM 20 MG/1
40 TABLET, DELAYED RELEASE ORAL
Refills: 0 | Status: DISCONTINUED | OUTPATIENT
Start: 2020-11-16 | End: 2020-11-18

## 2020-11-16 RX ORDER — THIAMINE MONONITRATE (VIT B1) 100 MG
100 TABLET ORAL DAILY
Refills: 0 | Status: DISCONTINUED | OUTPATIENT
Start: 2020-11-16 | End: 2020-11-19

## 2020-11-16 RX ORDER — SODIUM CHLORIDE 9 MG/ML
1000 INJECTION INTRAMUSCULAR; INTRAVENOUS; SUBCUTANEOUS
Refills: 0 | Status: DISCONTINUED | OUTPATIENT
Start: 2020-11-16 | End: 2020-11-19

## 2020-11-16 RX ADMIN — Medication 50 MILLIGRAM(S): at 17:51

## 2020-11-16 RX ADMIN — Medication 1 MILLIGRAM(S): at 23:57

## 2020-11-16 RX ADMIN — ONDANSETRON 4 MILLIGRAM(S): 8 TABLET, FILM COATED ORAL at 09:36

## 2020-11-16 RX ADMIN — Medication 30 MILLILITER(S): at 21:41

## 2020-11-16 RX ADMIN — SODIUM CHLORIDE 125 MILLILITER(S): 9 INJECTION INTRAMUSCULAR; INTRAVENOUS; SUBCUTANEOUS at 22:13

## 2020-11-16 RX ADMIN — Medication 30 MILLILITER(S): at 13:47

## 2020-11-16 RX ADMIN — Medication 50 MILLIGRAM(S): at 23:56

## 2020-11-16 RX ADMIN — Medication 50 MILLIGRAM(S): at 05:53

## 2020-11-16 RX ADMIN — Medication 2 MILLIGRAM(S): at 04:09

## 2020-11-16 RX ADMIN — Medication 1 MILLIGRAM(S): at 00:47

## 2020-11-16 RX ADMIN — Medication 50 MILLIGRAM(S): at 11:38

## 2020-11-16 RX ADMIN — HALOPERIDOL DECANOATE 2.5 MILLIGRAM(S): 100 INJECTION INTRAMUSCULAR at 00:09

## 2020-11-16 RX ADMIN — ATORVASTATIN CALCIUM 20 MILLIGRAM(S): 80 TABLET, FILM COATED ORAL at 21:41

## 2020-11-16 RX ADMIN — PANTOPRAZOLE SODIUM 40 MILLIGRAM(S): 20 TABLET, DELAYED RELEASE ORAL at 05:52

## 2020-11-16 RX ADMIN — SODIUM CHLORIDE 125 MILLILITER(S): 9 INJECTION INTRAMUSCULAR; INTRAVENOUS; SUBCUTANEOUS at 01:22

## 2020-11-16 RX ADMIN — Medication 100 MILLIGRAM(S): at 11:38

## 2020-11-16 RX ADMIN — Medication 1 MILLIGRAM(S): at 11:38

## 2020-11-16 RX ADMIN — SODIUM CHLORIDE 125 MILLILITER(S): 9 INJECTION INTRAMUSCULAR; INTRAVENOUS; SUBCUTANEOUS at 09:36

## 2020-11-16 RX ADMIN — ONDANSETRON 4 MILLIGRAM(S): 8 TABLET, FILM COATED ORAL at 15:23

## 2020-11-16 RX ADMIN — Medication 30 MILLILITER(S): at 17:51

## 2020-11-16 RX ADMIN — PANTOPRAZOLE SODIUM 40 MILLIGRAM(S): 20 TABLET, DELAYED RELEASE ORAL at 17:51

## 2020-11-16 NOTE — H&P ADULT - NSHPLABSRESULTS_GEN_ALL_CORE
T(C): 37.3 (11-15-20 @ 23:02), Max: 37.3 (11-15-20 @ 23:02)  HR: 80 (11-16-20 @ 00:46) (80 - 112)  BP: 108/68 (11-16-20 @ 00:46) (108/68 - 135/85)  RR: 16 (11-16-20 @ 00:46) (16 - 18)  SpO2: 94% (11-16-20 @ 00:46) (94% - 98%)                        13.1   5.84  )-----------( 222      ( 15 Nov 2020 20:29 )             39.4     11-15    140  |  98  |  17  ----------------------------<  82  3.8   |  25  |  0.92    Ca    9.2      15 Nov 2020 20:29  Phos  3.6     11-15  Mg     2.1     11-15    TPro  8.2  /  Alb  3.8  /  TBili  0.3  /  DBili  x   /  AST  107<H>  /  ALT  89<H>  /  AlkPhos  52  11-15    LIVER FUNCTIONS - ( 15 Nov 2020 20:29 )  Alb: 3.8 g/dL / Pro: 8.2 gm/dL / ALK PHOS: 52 U/L / ALT: 89 U/L / AST: 107 U/L / GGT: x                   atorvastatin 20 milliGRAM(s) Oral at bedtime  chlordiazePOXIDE 50 milliGRAM(s) Oral every 6 hours  chlordiazePOXIDE   Oral   folic acid 1 milliGRAM(s) Oral daily  LORazepam   Injectable 1 milliGRAM(s) IV Push every 1 hour PRN  LORazepam   Injectable 2 milliGRAM(s) IV Push every 2 hours PRN  ondansetron Injectable 4 milliGRAM(s) IV Push every 6 hours PRN  pantoprazole  Injectable 40 milliGRAM(s) IV Push two times a day  sodium chloride 0.9%. 1000 milliLiter(s) IV Continuous <Continuous>  thiamine 100 milliGRAM(s) Oral daily

## 2020-11-16 NOTE — CHART NOTE - NSCHARTNOTEFT_GEN_A_CORE
House- Medicine NP:    Asked by RN for IV insertion. Multiple RNs unsuccesful after several attempts.   Patient is a 55y old  Male who presents with a chief complaint of Alcohol Withdrawal (16 Nov 2020 12:38)      T(F): 99 (11-16-20 @ 12:39)  HR: 92 (11-16-20 @ 12:39)  BP: 146/83 (11-16-20 @ 12:39)  RR: 20 (11-16-20 @ 12:39)  SpO2: 95% (11-16-20 @ 12:39)  Wt(kg): --    IV inserted to R ac guage #20 and L hand guage #22. + blood return, flushes well. Pt tolerated procedure well.

## 2020-11-16 NOTE — H&P ADULT - ASSESSMENT
56 y/o male w/ ETOH abuse, alcoholic gastritis/esophagitis, PUD w/ recurrent admissions for alcohol withdrawal, abdominal pain/gastritis, last admx 1 week ago, presents to the ED c/o abdominal pain, and shakiness. Pt being admitted for gastritis and alcohol withdrawal.     IMPROVE VTE Individual Risk Assessment    RISK                                                                Points    [  ] Previous VTE                                                  3    [  ] Thrombophilia                                               2    [  ] Lower limb paralysis                                      2        (unable to hold up >15 seconds)      [  ] Current Cancer                                              2         (within 6 months)    [  ] Immobilization > 24 hrs                                1    [  ] ICU/CCU stay > 24 hours                              1    [  ] Age > 60                                                      1    IMPROVE VTE Score _________    IMPROVE Score 0-1: Low Risk, No VTE prophylaxis required for most patients, encourage ambulation.   IMPROVE Score 2-3: At risk, pharmacologic VTE prophylaxis is indicated for most patients (in the absence of a contraindication)  IMPROVE Score > or = 4: High Risk, pharmacologic VTE prophylaxis is indicated for most patients (in the absence of a contraindication)

## 2020-11-16 NOTE — H&P ADULT - PROBLEM SELECTOR PLAN 2
- pt w/ 1 episode of coffee ground emesis  - place on PPI IV bid for now  - NPO for now, re-eval in am, if not further episode start clears adv diet as tolerated.

## 2020-11-16 NOTE — H&P ADULT - PROBLEM SELECTOR PLAN 1
admit to medicine  [facility] protocol w/taper and prn as pt usualy has severe withdrawals  seizure precautions  mvi/folate/thaimine  encouraged cessation normal...

## 2020-11-16 NOTE — PROGRESS NOTE ADULT - SUBJECTIVE AND OBJECTIVE BOX
Subjective: Complaining of burning pain in chest and throat.     MEDICATIONS  (STANDING):  atorvastatin 20 milliGRAM(s) Oral at bedtime  chlordiazePOXIDE   Oral   chlordiazePOXIDE 50 milliGRAM(s) Oral every 6 hours  folic acid 1 milliGRAM(s) Oral daily  pantoprazole  Injectable 40 milliGRAM(s) IV Push two times a day  sodium chloride 0.9%. 1000 milliLiter(s) (125 mL/Hr) IV Continuous <Continuous>  thiamine 100 milliGRAM(s) Oral daily    MEDICATIONS  (PRN):  LORazepam   Injectable 1 milliGRAM(s) IV Push every 1 hour PRN CIWA < 7  LORazepam   Injectable 2 milliGRAM(s) IV Push every 2 hours PRN CIWA > 8  ondansetron Injectable 4 milliGRAM(s) IV Push every 6 hours PRN Nausea and/or Vomiting      Allergies    No Known Allergies    Intolerances        Vital Signs Last 24 Hrs  T(C): 37.1 (16 Nov 2020 03:55), Max: 37.3 (15 Nov 2020 23:02)  T(F): 98.7 (16 Nov 2020 03:55), Max: 99.2 (15 Nov 2020 23:02)  HR: 102 (16 Nov 2020 03:55) (80 - 112)  BP: 145/84 (16 Nov 2020 03:55) (108/68 - 145/84)  BP(mean): --  RR: 22 (16 Nov 2020 03:55) (16 - 22)  SpO2: 97% (16 Nov 2020 03:55) (94% - 98%)    PHYSICAL EXAM:  GENERAL: NAD, well-groomed, well-developed  HEAD:  Atraumatic, Normocephalic  ENMT: Moist mucous membranes,   NECK: Supple, No JVD, Normal thyroid  NERVOUS SYSTEM:  All 4 extremities mobile, no gross sensory deficits.   CHEST/LUNG: Clear to auscultation bilaterally; No rales, rhonchi, wheezing, or rubs  HEART: Regular rate and rhythm; No murmurs, rubs, or gallops  ABDOMEN: Soft, Nontender, Nondistended; Bowel sounds present  EXTREMITIES:  Tremors of Outstretched Hands      LABS:                        10.6   7.52  )-----------( 156      ( 16 Nov 2020 07:45 )             32.7     16 Nov 2020 07:45    143    |  103    |  15     ----------------------------<  58     3.4     |  29     |  0.88     Ca    8.1        16 Nov 2020 07:45  Phos  2.4       16 Nov 2020 07:45  Mg     1.8       16 Nov 2020 07:45    TPro  6.7    /  Alb  3.1    /  TBili  0.6    /  DBili  .17    /  AST  86     /  ALT  71     /  AlkPhos  44     16 Nov 2020 07:45        CAPILLARY BLOOD GLUCOSE          RADIOLOGY & ADDITIONAL TESTS:    Imaging Personally Reviewed:  [ ] YES     Consultant(s) Notes Reviewed:      Care Discussed with Consultants/Other Providers:    Advanced Directives: [ ] DNR  [ ] No feeding tube  [ ] MOLST in chart  [ ] MOLST completed today  [ ] Unknown

## 2020-11-16 NOTE — H&P ADULT - NSHPPHYSICALEXAM_GEN_ALL_CORE
PHYSICAL EXAM:    Vital Signs Last 24 Hrs  T(C): 37.3 (15 Nov 2020 23:02), Max: 37.3 (15 Nov 2020 23:02)  T(F): 99.2 (15 Nov 2020 23:02), Max: 99.2 (15 Nov 2020 23:02)  HR: 80 (16 Nov 2020 00:46) (80 - 112)  BP: 108/68 (16 Nov 2020 00:46) (108/68 - 135/85)  BP(mean): --  RR: 16 (16 Nov 2020 00:46) (16 - 18)  SpO2: 94% (16 Nov 2020 00:46) (94% - 98%)    GENERAL: Pt lying in bed comfortably in NAD  HEENT:  Atraumatic, PERRL, MMM  NECK: Supple, No JVD  CHEST/LUNG: Clear to auscultation bilaterally; Unlabored respirations  HEART: Regular rate and rhythm; No murmurs, rubs, or gallops  ABDOMEN: Bowel sounds present; Soft, tender in epigastrium, Nondistended.   EXTREMITIES:  2+ Peripheral Pulses, brisk capillary refill. No clubbing, cyanosis, or edema  NEUROLOGICAL:  somnolent but rousable, moved all extremites  MSK: FROM x 4 extremities   SKIN: No rashes or lesions

## 2020-11-16 NOTE — H&P ADULT - HISTORY OF PRESENT ILLNESS
56 y/o male w/ ETOH abuse, alcoholic gastritis/esophagitis, PUD w/ recurrent admissions for alcohol withdrawal, abdominal pain/gastritis, last admx 1 week ago, presents to the ED c/o abdominal pain. Pt's last drink per ED was a day ago, he was noted to have a CIWA score of 13, given Librium and Ativan. Pt current somnolent unable to provide history. History obtained from ED staff. Per ED staff pt  stating, "im shaking" and "I have too much pain" while pointing to his epigastric region. In ED pt had 1 episode of emesis looked like coffee grounds. Unable to obtain further history.    In ED pt initially tachy to 112, rest of vitals stable. Labs stable. Pt given GI cocktail, librium and 1mg IV ativan

## 2020-11-17 LAB
ALBUMIN SERPL ELPH-MCNC: 3 G/DL — LOW (ref 3.3–5)
ALP SERPL-CCNC: 38 U/L — LOW (ref 40–120)
ALT FLD-CCNC: 65 U/L — SIGNIFICANT CHANGE UP (ref 12–78)
AMYLASE P1 CFR SERPL: 113 U/L — SIGNIFICANT CHANGE UP (ref 25–115)
ANION GAP SERPL CALC-SCNC: 5 MMOL/L — SIGNIFICANT CHANGE UP (ref 5–17)
AST SERPL-CCNC: 75 U/L — HIGH (ref 15–37)
BILIRUB SERPL-MCNC: 1.2 MG/DL — SIGNIFICANT CHANGE UP (ref 0.2–1.2)
BUN SERPL-MCNC: 12 MG/DL — SIGNIFICANT CHANGE UP (ref 7–23)
CALCIUM SERPL-MCNC: 7.8 MG/DL — LOW (ref 8.5–10.1)
CHLORIDE SERPL-SCNC: 108 MMOL/L — SIGNIFICANT CHANGE UP (ref 96–108)
CO2 SERPL-SCNC: 27 MMOL/L — SIGNIFICANT CHANGE UP (ref 22–31)
CREAT SERPL-MCNC: 0.8 MG/DL — SIGNIFICANT CHANGE UP (ref 0.5–1.3)
GLUCOSE SERPL-MCNC: 70 MG/DL — SIGNIFICANT CHANGE UP (ref 70–99)
HCT VFR BLD CALC: 30.6 % — LOW (ref 39–50)
HGB BLD-MCNC: 9.9 G/DL — LOW (ref 13–17)
LIDOCAIN IGE QN: 356 U/L — SIGNIFICANT CHANGE UP (ref 73–393)
MCHC RBC-ENTMCNC: 28 PG — SIGNIFICANT CHANGE UP (ref 27–34)
MCHC RBC-ENTMCNC: 32.4 GM/DL — SIGNIFICANT CHANGE UP (ref 32–36)
MCV RBC AUTO: 86.4 FL — SIGNIFICANT CHANGE UP (ref 80–100)
NRBC # BLD: 0 /100 WBCS — SIGNIFICANT CHANGE UP (ref 0–0)
PLATELET # BLD AUTO: 119 K/UL — LOW (ref 150–400)
POTASSIUM SERPL-MCNC: 3.7 MMOL/L — SIGNIFICANT CHANGE UP (ref 3.5–5.3)
POTASSIUM SERPL-SCNC: 3.7 MMOL/L — SIGNIFICANT CHANGE UP (ref 3.5–5.3)
PROT SERPL-MCNC: 6.9 GM/DL — SIGNIFICANT CHANGE UP (ref 6–8.3)
RBC # BLD: 3.54 M/UL — LOW (ref 4.2–5.8)
RBC # FLD: 15.6 % — HIGH (ref 10.3–14.5)
SODIUM SERPL-SCNC: 140 MMOL/L — SIGNIFICANT CHANGE UP (ref 135–145)
WBC # BLD: 3.13 K/UL — LOW (ref 3.8–10.5)
WBC # FLD AUTO: 3.13 K/UL — LOW (ref 3.8–10.5)

## 2020-11-17 PROCEDURE — 74176 CT ABD & PELVIS W/O CONTRAST: CPT | Mod: 26

## 2020-11-17 PROCEDURE — 99233 SBSQ HOSP IP/OBS HIGH 50: CPT

## 2020-11-17 RX ADMIN — Medication 1 MILLIGRAM(S): at 18:03

## 2020-11-17 RX ADMIN — Medication 1 MILLIGRAM(S): at 09:17

## 2020-11-17 RX ADMIN — PANTOPRAZOLE SODIUM 40 MILLIGRAM(S): 20 TABLET, DELAYED RELEASE ORAL at 18:02

## 2020-11-17 RX ADMIN — Medication 1 MILLIGRAM(S): at 12:22

## 2020-11-17 RX ADMIN — Medication 1 MILLIGRAM(S): at 14:02

## 2020-11-17 RX ADMIN — Medication 100 MILLIGRAM(S): at 12:22

## 2020-11-17 RX ADMIN — SODIUM CHLORIDE 125 MILLILITER(S): 9 INJECTION INTRAMUSCULAR; INTRAVENOUS; SUBCUTANEOUS at 07:22

## 2020-11-17 RX ADMIN — Medication 50 MILLIGRAM(S): at 21:25

## 2020-11-17 RX ADMIN — PANTOPRAZOLE SODIUM 40 MILLIGRAM(S): 20 TABLET, DELAYED RELEASE ORAL at 05:15

## 2020-11-17 RX ADMIN — ATORVASTATIN CALCIUM 20 MILLIGRAM(S): 80 TABLET, FILM COATED ORAL at 21:25

## 2020-11-17 RX ADMIN — SODIUM CHLORIDE 125 MILLILITER(S): 9 INJECTION INTRAMUSCULAR; INTRAVENOUS; SUBCUTANEOUS at 05:15

## 2020-11-17 RX ADMIN — Medication 50 MILLIGRAM(S): at 14:01

## 2020-11-17 RX ADMIN — Medication 1 MILLIGRAM(S): at 12:21

## 2020-11-17 RX ADMIN — Medication 30 MILLILITER(S): at 12:21

## 2020-11-17 NOTE — PROGRESS NOTE ADULT - SUBJECTIVE AND OBJECTIVE BOX
Subjective: Patient complaining of abdominal pain.      MEDICATIONS  (STANDING):  atorvastatin 20 milliGRAM(s) Oral at bedtime  chlordiazePOXIDE   Oral   chlordiazePOXIDE 50 milliGRAM(s) Oral every 8 hours  folic acid 1 milliGRAM(s) Oral daily  pantoprazole  Injectable 40 milliGRAM(s) IV Push two times a day  sodium chloride 0.9%. 1000 milliLiter(s) (125 mL/Hr) IV Continuous <Continuous>  thiamine 100 milliGRAM(s) Oral daily    MEDICATIONS  (PRN):  aluminum hydroxide/magnesium hydroxide/simethicone Suspension 30 milliLiter(s) Oral every 4 hours PRN Dyspepsia  LORazepam   Injectable 1 milliGRAM(s) IV Push every 1 hour PRN CIWA < 7  LORazepam   Injectable 2 milliGRAM(s) IV Push every 2 hours PRN CIWA > 8  ondansetron Injectable 4 milliGRAM(s) IV Push every 6 hours PRN Nausea and/or Vomiting      Allergies    No Known Allergies    Intolerances        Vital Signs Last 24 Hrs  T(C): 36.8 (17 Nov 2020 12:25), Max: 37.3 (16 Nov 2020 23:45)  T(F): 98.3 (17 Nov 2020 12:25), Max: 99.2 (16 Nov 2020 23:45)  HR: 77 (17 Nov 2020 12:25) (74 - 86)  BP: 135/79 (17 Nov 2020 12:25) (113/63 - 135/79)  BP(mean): --  RR: 18 (17 Nov 2020 12:25) (18 - 20)  SpO2: 98% (17 Nov 2020 12:25) (96% - 98%)    PHYSICAL EXAM:  GENERAL: NAD, well-groomed, well-developed  HEAD:  Atraumatic, Normocephalic  ENMT: Moist mucous membranes,   NECK: Supple, No JVD, Normal thyroid  NERVOUS SYSTEM:  All 4 extremities mobile, no gross sensory deficits.   CHEST/LUNG: Clear to auscultation bilaterally; No rales, rhonchi, wheezing, or rubs  HEART: Regular rate and rhythm; No murmurs, rubs, or gallops  ABDOMEN: Soft, Nontender, Nondistended; Bowel sounds present  EXTREMITIES:  2+ Peripheral Pulses, No clubbing, cyanosis, or edema      LABS:                        9.9    3.13  )-----------( 119      ( 17 Nov 2020 06:29 )             30.6     17 Nov 2020 06:29    140    |  108    |  12     ----------------------------<  70     3.7     |  27     |  0.80     Ca    7.8        17 Nov 2020 06:29    TPro  6.9    /  Alb  3.0    /  TBili  1.2    /  DBili  x      /  AST  75     /  ALT  65     /  AlkPhos  38     17 Nov 2020 06:29        CAPILLARY BLOOD GLUCOSE          RADIOLOGY & ADDITIONAL TESTS:    Imaging Personally Reviewed:  [ ] YES     Consultant(s) Notes Reviewed:      Care Discussed with Consultants/Other Providers:    Advanced Directives: [ ] DNR  [ ] No feeding tube  [ ] MOLST in chart  [ ] MOLST completed today  [ ] Unknown

## 2020-11-17 NOTE — SBIRT NOTE ADULT - NSSBIRTSCREENAVAIL_GEN_A_CORE
No [facility]  Neurocritical Care  History & Physical    Admit Date: 5/14/2020  Service Date: 05/14/2020  Length of Stay: 0    Subjective:     Chief Complaint: <principal problem not specified>    History of Present Illness: 74 yo male with no PMHX and no contact with health care his entire adult life transferred from [facility] after he presented there for new onset of dense right hemiparesis and marked dysarthria. Onset unclear but likely around 3am this morning. No recent illness. Legally blind. BP on arrival to outside facility 247/129. On Cardene ggt. CTH remarkable for left caudate tail/body / thalamic intracerebral hemorrhage w/ intraventricular extension. Telemedicine-stroke done by Dr Looney.  Currently pt awake, alert, oriented, following commands. No movement against gravity on the right side. On Cardene ggt. STAT consult to NSGY. Awaiting recs.     No past medical history on file.  No past surgical history on file.   Current Facility-Administered Medications on File Prior to Encounter   Medication Dose Route Frequency Provider Last Rate Last Dose    [DISCONTINUED] niCARdipine 40 mg/200 mL infusion  5 mg/hr Intravenous Continuous Brett Alcazar MD        [DISCONTINUED] niCARdipine 40 mg/200 mL infusion  5 mg/hr Intravenous Continuous Brett Alcazar MD 37.5 mL/hr at 05/14/20 0920 7.5 mg/hr at 05/14/20 0920     No current outpatient medications on file prior to encounter.      Allergies: Patient has no known allergies.    No family history on file.  Social History     Tobacco Use    Smoking status: Not on file   Substance Use Topics    Alcohol use: Not on file    Drug use: Not on file     Review of Systems   Constitutional: Negative for chills, diaphoresis and fever.   HENT: Negative for congestion.    Eyes: Positive for visual disturbance.   Respiratory: Negative for wheezing.    Cardiovascular: Negative for chest pain and palpitations.   Gastrointestinal: Negative  for abdominal distention and abdominal pain.   Neurological: Positive for facial asymmetry, speech difficulty and weakness. Negative for seizures and headaches.   Psychiatric/Behavioral: Negative for agitation and behavioral problems.        Objective:     Vitals:         Temp  Min: 97.8 °F (36.6 °C)  Max: 97.8 °F (36.6 °C)  Pulse  Min: 94  Max: 105  BP  Min: 108/76  Max: 181/96  MAP (mmHg)  Min: 87  Max: 131  Resp  Min: 20  Max: 27  SpO2  Min: 90 %  Max: 93 %    No intake/output data recorded.           Physical Exam   Constitutional: He appears well-developed and well-nourished.   HENT:   Head: Normocephalic and atraumatic.   Eyes: Pupils are equal, round, and reactive to light. Conjunctivae and EOM are normal.   Neck: Normal range of motion. Neck supple.   Cardiovascular: Normal rate, regular rhythm, normal heart sounds and intact distal pulses.   Pulmonary/Chest: Breath sounds normal.   Abdominal: Soft. Bowel sounds are normal. He exhibits no distension.   Skin: Skin is warm and dry.   Psychiatric: He has a normal mood and affect.     Neurologic exam  Awake, alert, following commands  Dysarthric  PERRL, EOMI  R facial droop, V1-V3 intact  Unable to resist gravity on RUE/RLE, minimal movements  5/5 strength LUE/LLE  Decreased light touch RUE/RLE   Normal FN on the left    Today I personally reviewed pertinent medications, imaging, laboratory results.   CTH:  Evidence of intraparenchymal hemorrhage centered in the region of the posterior limb of the left internal capsule with intraventricular extension.    Assessment/Plan:     Neuro  Nontraumatic intracerebral hemorrhage  -New onset of RSW and dysarthria in setting of /129 on presentation, no meds at home.  -CTH remarkable for left caudate tail/body / thalamic intracerebral hemorrhage w/ intraventricular extension.   -Telemedicine-stroke done by Dr Looney  -Admitted to [facility]  -q1hr neurocheck  -SBP goal <140 9 On Cardene ggt, currently at goal  -Vascular  neurology and STAT NSGY consult. Appreciate recs.  -PT/INR, PTT wnl     Cardiac/Vascular  Essential hypertension  -BP on arrival to outside facility 247/129, required Cardene ggt  -SBP goal <140, currently on cardene ggt, at goal.            The patient is being Prophylaxed for:  Venous Thromboembolism with: Mechanical  Stress Ulcer with: Not Applicable   Ventilator Pneumonia with: not applicable    Activity Orders          Diet NPO: NPO starting at 05/14 1056    Commode at bedside starting at 05/14 1056        Full Code    Krystal Alex MD  Neurocritical Care  [facility]

## 2020-11-17 NOTE — PROGRESS NOTE ADULT - SUBJECTIVE AND OBJECTIVE BOX
[facility] NEPHROLOGY SERVICES, [facility]  NEPHROLOGY AND HYPERTENSION  [address]  [address]  [place] [address]  [phone]    MD JACEY BUSTAMANTE, MD TOMMY LEWIS, MD TANNER RAO, MD ADELFO HOSKINS, MD CIARA SIMMONS MD          Patient events noted no distress    MEDICATIONS  (STANDING):  atorvastatin 20 milliGRAM(s) Oral at bedtime  chlordiazePOXIDE   Oral   chlordiazePOXIDE 50 milliGRAM(s) Oral every 8 hours  folic acid 1 milliGRAM(s) Oral daily  pantoprazole  Injectable 40 milliGRAM(s) IV Push two times a day  sodium chloride 0.9%. 1000 milliLiter(s) (125 mL/Hr) IV Continuous <Continuous>  thiamine 100 milliGRAM(s) Oral daily    MEDICATIONS  (PRN):  aluminum hydroxide/magnesium hydroxide/simethicone Suspension 30 milliLiter(s) Oral every 4 hours PRN Dyspepsia  LORazepam   Injectable 1 milliGRAM(s) IV Push every 1 hour PRN CIWA < 7  LORazepam   Injectable 2 milliGRAM(s) IV Push every 2 hours PRN CIWA > 8  ondansetron Injectable 4 milliGRAM(s) IV Push every 6 hours PRN Nausea and/or Vomiting      11-16-20 @ 07:01  -  11-17-20 @ 07:00  --------------------------------------------------------  IN: 1500 mL / OUT: 800 mL / NET: 700 mL    11-17-20 @ 07:01  -  11-17-20 @ 20:32  --------------------------------------------------------  IN: 1500 mL / OUT: 650 mL / NET: 850 mL      PHYSICAL EXAM:      T(C): 36.8 (11-17-20 @ 17:11), Max: 37.3 (11-16-20 @ 23:45)  HR: 77 (11-17-20 @ 17:11) (74 - 82)  BP: 137/85 (11-17-20 @ 17:11) (130/77 - 137/85)  RR: 18 (11-17-20 @ 17:11) (18 - 20)  SpO2: 98% (11-17-20 @ 17:11) (96% - 98%)  Wt(kg): --  Lungs clear  Heart S1S2  Abd soft NT ND  Extremities:   tr edema                                    9.9    3.13  )-----------( 119      ( 17 Nov 2020 06:29 )             30.6     11-17    140  |  108  |  12  ----------------------------<  70  3.7   |  27  |  0.80    Ca    7.8<L>      17 Nov 2020 06:29  Phos  2.4     11-16  Mg     1.8     11-16    TPro  6.9  /  Alb  3.0<L>  /  TBili  1.2  /  DBili  x   /  AST  75<H>  /  ALT  65  /  AlkPhos  38<L>  11-17      LIVER FUNCTIONS - ( 17 Nov 2020 06:29 )  Alb: 3.0 g/dL / Pro: 6.9 gm/dL / ALK PHOS: 38 U/L / ALT: 65 U/L / AST: 75 U/L / GGT: x           Creatinine Trend: 0.80<--, 0.88<--, 0.92<--, 0.76<--, 0.74<--, 1.54<--      Assessment   ESRD; fluid overload; COVID+ presumed PNA  Improving    Plan:  Maintenance HD today  UF as tolerated  Will follow course   Discharge planning Covid testing armand Abebe MD

## 2020-11-18 LAB — SARS-COV-2 RNA SPEC QL NAA+PROBE: SIGNIFICANT CHANGE UP

## 2020-11-18 PROCEDURE — 99232 SBSQ HOSP IP/OBS MODERATE 35: CPT

## 2020-11-18 RX ORDER — PANTOPRAZOLE SODIUM 20 MG/1
40 TABLET, DELAYED RELEASE ORAL
Refills: 0 | Status: DISCONTINUED | OUTPATIENT
Start: 2020-11-18 | End: 2020-11-19

## 2020-11-18 RX ADMIN — Medication 100 MILLIGRAM(S): at 11:34

## 2020-11-18 RX ADMIN — ATORVASTATIN CALCIUM 20 MILLIGRAM(S): 80 TABLET, FILM COATED ORAL at 21:32

## 2020-11-18 RX ADMIN — Medication 1 MILLIGRAM(S): at 21:13

## 2020-11-18 RX ADMIN — Medication 2 MILLIGRAM(S): at 01:45

## 2020-11-18 RX ADMIN — Medication 1 MILLIGRAM(S): at 20:21

## 2020-11-18 RX ADMIN — Medication 2 MILLIGRAM(S): at 00:11

## 2020-11-18 RX ADMIN — PANTOPRAZOLE SODIUM 40 MILLIGRAM(S): 20 TABLET, DELAYED RELEASE ORAL at 17:51

## 2020-11-18 RX ADMIN — PANTOPRAZOLE SODIUM 40 MILLIGRAM(S): 20 TABLET, DELAYED RELEASE ORAL at 05:24

## 2020-11-18 RX ADMIN — Medication 1 MILLIGRAM(S): at 11:34

## 2020-11-18 RX ADMIN — Medication 50 MILLIGRAM(S): at 05:23

## 2020-11-18 RX ADMIN — Medication 50 MILLIGRAM(S): at 17:51

## 2020-11-18 NOTE — PROGRESS NOTE ADULT - SUBJECTIVE AND OBJECTIVE BOX
Patient is a 55y old  Male who presents with a chief complaint of Alcohol Withdrawal (17 Nov 2020 12:54)      INTERVAL HPI/OVERNIGHT EVENTS: no events     MEDICATIONS  (STANDING):  atorvastatin 20 milliGRAM(s) Oral at bedtime  chlordiazePOXIDE   Oral   chlordiazePOXIDE 50 milliGRAM(s) Oral every 12 hours  folic acid 1 milliGRAM(s) Oral daily  pantoprazole  Injectable 40 milliGRAM(s) IV Push two times a day  sodium chloride 0.9%. 1000 milliLiter(s) (125 mL/Hr) IV Continuous <Continuous>  thiamine 100 milliGRAM(s) Oral daily    MEDICATIONS  (PRN):  aluminum hydroxide/magnesium hydroxide/simethicone Suspension 30 milliLiter(s) Oral every 4 hours PRN Dyspepsia  LORazepam   Injectable 1 milliGRAM(s) IV Push every 1 hour PRN CIWA < 7  LORazepam   Injectable 2 milliGRAM(s) IV Push every 2 hours PRN CIWA > 8  ondansetron Injectable 4 milliGRAM(s) IV Push every 6 hours PRN Nausea and/or Vomiting      Allergies    No Known Allergies    Intolerances       Vital Signs Last 24 Hrs  T(C): 36.8 (18 Nov 2020 12:12), Max: 37 (17 Nov 2020 23:55)  T(F): 98.2 (18 Nov 2020 12:12), Max: 98.6 (17 Nov 2020 23:55)  HR: 77 (18 Nov 2020 12:12) (68 - 77)  BP: 144/90 (18 Nov 2020 12:12) (125/78 - 144/90)  BP(mean): --  RR: 18 (18 Nov 2020 12:12) (17 - 18)  SpO2: 99% (18 Nov 2020 12:12) (98% - 99%)    PHYSICAL EXAM:  GENERAL: NAD, well-groomed, well-developed  HEAD:  Atraumatic, Normocephalic  EYES: EOMI, PERRLA, conjunctiva and sclera clear  ENMT: No tonsillar erythema, exudates, or enlargement; Moist mucous membranes, Good dentition, No lesions  NECK: Supple, No JVD, Normal thyroid  NERVOUS SYSTEM:  Alert & Oriented X3, Good concentration; Motor Strength 5/5 B/L upper and lower extremities; DTRs 2+ intact and symmetric  CHEST/LUNG: Clear to percussion bilaterally; No rales, rhonchi, wheezing, or rubs  HEART: Regular rate and rhythm; No murmurs, rubs, or gallops  ABDOMEN: Soft, Nontender, Nondistended; Bowel sounds present  EXTREMITIES:  2+ Peripheral Pulses, No clubbing, cyanosis, or edema  LYMPH: No lymphadenopathy noted  SKIN: No rashes or lesions    LABS:                        9.9    3.13  )-----------( 119      ( 17 Nov 2020 06:29 )             30.6     11-17    140  |  108  |  12  ----------------------------<  70  3.7   |  27  |  0.80    Ca    7.8<L>      17 Nov 2020 06:29    TPro  6.9  /  Alb  3.0<L>  /  TBili  1.2  /  DBili  x   /  AST  75<H>  /  ALT  65  /  AlkPhos  38<L>  11-17        CAPILLARY BLOOD GLUCOSE          RADIOLOGY & ADDITIONAL TESTS:    Imaging Personally Reviewed:  [ X] YES  [ ] NO    Consultant(s) Notes Reviewed:  [ X] YES  [ ] NO    Care Discussed with Consultants/Other Providers [X ] YES  [ ] NO

## 2020-11-19 ENCOUNTER — TRANSCRIPTION ENCOUNTER (OUTPATIENT)
Age: 55
End: 2020-11-19

## 2020-11-19 VITALS
WEIGHT: 166.23 LBS | RESPIRATION RATE: 17 BRPM | OXYGEN SATURATION: 100 % | DIASTOLIC BLOOD PRESSURE: 95 MMHG | TEMPERATURE: 98 F | SYSTOLIC BLOOD PRESSURE: 144 MMHG | HEART RATE: 71 BPM

## 2020-11-19 PROCEDURE — 99239 HOSP IP/OBS DSCHRG MGMT >30: CPT

## 2020-11-19 RX ADMIN — Medication 1 MILLIGRAM(S): at 11:13

## 2020-11-19 RX ADMIN — Medication 50 MILLIGRAM(S): at 05:26

## 2020-11-19 RX ADMIN — Medication 100 MILLIGRAM(S): at 11:13

## 2020-11-19 RX ADMIN — PANTOPRAZOLE SODIUM 40 MILLIGRAM(S): 20 TABLET, DELAYED RELEASE ORAL at 05:26

## 2020-11-19 RX ADMIN — Medication 50 MILLIGRAM(S): at 11:12

## 2020-11-19 NOTE — DISCHARGE NOTE NURSING/CASE MANAGEMENT/SOCIAL WORK - PATIENT PORTAL LINK FT
You can access the FollowMyHealth Patient Portal offered by [facility] by registering at the following website: [email or web address]. By joining [employer]’s FollowMyHealth portal, you will also be able to view your health information using other applications (apps) compatible with our system.

## 2020-11-19 NOTE — DISCHARGE NOTE PROVIDER - HOSPITAL COURSE
55M with ETOH Abuse, PUD, and recently discharged from [facility] for Pyelonephritis and ETOH Withdrawal readmitted for ETOH Withdrawal    ETOH Withdrawal  pt feels well dc home   States he wants to quit but doesnt want help and wants to do it on his own  MVI + Folic Acid + Thiamine supplementation     Abdominal Pain  Has PUD and on PPI   no pancreatitis   Diet  Regular    DVT Prophylaxis  Lovenox    Disposition  Full Code/Inpatient       Attending Attestation:   34 minutes spent on total dc encounter; more than 50% of the visit was spent counseling and/or coordinating care by the attending physician.

## 2020-11-19 NOTE — DISCHARGE NOTE PROVIDER - NSDCCPCAREPLAN_GEN_ALL_CORE_FT
PRINCIPAL DISCHARGE DIAGNOSIS  Diagnosis: Alcohol withdrawal  Assessment and Plan of Treatment: no more drinking alcohol

## 2020-12-01 ENCOUNTER — OUTPATIENT (OUTPATIENT)
Dept: OUTPATIENT SERVICES | Facility: HOSPITAL | Age: 55
LOS: 1 days | End: 2020-12-01
Payer: MEDICAID

## 2020-12-10 ENCOUNTER — INPATIENT (INPATIENT)
Facility: HOSPITAL | Age: 55
LOS: 0 days | Discharge: HOME | End: 2020-12-11
Attending: HOSPITALIST | Admitting: HOSPITALIST
Payer: MEDICAID

## 2020-12-10 VITALS
HEIGHT: 67 IN | DIASTOLIC BLOOD PRESSURE: 112 MMHG | RESPIRATION RATE: 20 BRPM | WEIGHT: 149.91 LBS | TEMPERATURE: 98 F | SYSTOLIC BLOOD PRESSURE: 159 MMHG | HEART RATE: 110 BPM | OXYGEN SATURATION: 98 %

## 2020-12-10 LAB
ALBUMIN SERPL ELPH-MCNC: 4.2 G/DL — SIGNIFICANT CHANGE UP (ref 3.3–5)
ALP SERPL-CCNC: 45 U/L — SIGNIFICANT CHANGE UP (ref 40–120)
ALT FLD-CCNC: 43 U/L — SIGNIFICANT CHANGE UP (ref 12–78)
ANION GAP SERPL CALC-SCNC: 11 MMOL/L — SIGNIFICANT CHANGE UP (ref 5–17)
APPEARANCE UR: CLEAR — SIGNIFICANT CHANGE UP
AST SERPL-CCNC: 45 U/L — HIGH (ref 15–37)
BASOPHILS # BLD AUTO: 0 K/UL — SIGNIFICANT CHANGE UP (ref 0–0.2)
BASOPHILS NFR BLD AUTO: 0 % — SIGNIFICANT CHANGE UP (ref 0–2)
BILIRUB SERPL-MCNC: 0.6 MG/DL — SIGNIFICANT CHANGE UP (ref 0.2–1.2)
BILIRUB UR-MCNC: NEGATIVE — SIGNIFICANT CHANGE UP
BUN SERPL-MCNC: 13 MG/DL — SIGNIFICANT CHANGE UP (ref 7–23)
CALCIUM SERPL-MCNC: 9.6 MG/DL — SIGNIFICANT CHANGE UP (ref 8.5–10.1)
CHLORIDE SERPL-SCNC: 102 MMOL/L — SIGNIFICANT CHANGE UP (ref 96–108)
CO2 SERPL-SCNC: 27 MMOL/L — SIGNIFICANT CHANGE UP (ref 22–31)
COLOR SPEC: YELLOW — SIGNIFICANT CHANGE UP
CREAT SERPL-MCNC: 0.98 MG/DL — SIGNIFICANT CHANGE UP (ref 0.5–1.3)
D DIMER BLD IA.RAPID-MCNC: 228 NG/ML DDU — SIGNIFICANT CHANGE UP
DIFF PNL FLD: NEGATIVE — SIGNIFICANT CHANGE UP
EOSINOPHIL # BLD AUTO: 0.05 K/UL — SIGNIFICANT CHANGE UP (ref 0–0.5)
EOSINOPHIL NFR BLD AUTO: 2 % — SIGNIFICANT CHANGE UP (ref 0–6)
ETHANOL SERPL-MCNC: 10 MG/DL — SIGNIFICANT CHANGE UP (ref 0–10)
FLUAV AG NPH QL: SIGNIFICANT CHANGE UP COUNTS
FLUBV AG NPH QL: SIGNIFICANT CHANGE UP COUNTS
GLUCOSE SERPL-MCNC: 96 MG/DL — SIGNIFICANT CHANGE UP (ref 70–99)
GLUCOSE UR QL: NEGATIVE MG/DL — SIGNIFICANT CHANGE UP
HCT VFR BLD CALC: 36.4 % — LOW (ref 39–50)
HGB BLD-MCNC: 11.9 G/DL — LOW (ref 13–17)
KETONES UR-MCNC: ABNORMAL
LEUKOCYTE ESTERASE UR-ACNC: ABNORMAL
LYMPHOCYTES # BLD AUTO: 0.8 K/UL — LOW (ref 1–3.3)
LYMPHOCYTES # BLD AUTO: 32 % — SIGNIFICANT CHANGE UP (ref 13–44)
MANUAL SMEAR VERIFICATION: SIGNIFICANT CHANGE UP
MCHC RBC-ENTMCNC: 27.2 PG — SIGNIFICANT CHANGE UP (ref 27–34)
MCHC RBC-ENTMCNC: 32.7 GM/DL — SIGNIFICANT CHANGE UP (ref 32–36)
MCV RBC AUTO: 83.3 FL — SIGNIFICANT CHANGE UP (ref 80–100)
MONOCYTES # BLD AUTO: 0.2 K/UL — SIGNIFICANT CHANGE UP (ref 0–0.9)
MONOCYTES NFR BLD AUTO: 8 % — SIGNIFICANT CHANGE UP (ref 2–14)
NEUTROPHILS # BLD AUTO: 1.45 K/UL — LOW (ref 1.8–7.4)
NEUTROPHILS NFR BLD AUTO: 56 % — SIGNIFICANT CHANGE UP (ref 43–77)
NEUTS BAND # BLD: 2 % — SIGNIFICANT CHANGE UP (ref 0–8)
NITRITE UR-MCNC: NEGATIVE — SIGNIFICANT CHANGE UP
NRBC # BLD: 0 /100 — SIGNIFICANT CHANGE UP (ref 0–0)
NRBC # BLD: SIGNIFICANT CHANGE UP /100 WBCS (ref 0–0)
PH UR: 8 — SIGNIFICANT CHANGE UP (ref 5–8)
PLAT MORPH BLD: NORMAL — SIGNIFICANT CHANGE UP
PLATELET # BLD AUTO: 290 K/UL — SIGNIFICANT CHANGE UP (ref 150–400)
POTASSIUM SERPL-MCNC: 4.2 MMOL/L — SIGNIFICANT CHANGE UP (ref 3.5–5.3)
POTASSIUM SERPL-SCNC: 4.2 MMOL/L — SIGNIFICANT CHANGE UP (ref 3.5–5.3)
PROT SERPL-MCNC: 8.5 GM/DL — HIGH (ref 6–8.3)
PROT UR-MCNC: 15 MG/DL
RBC # BLD: 4.37 M/UL — SIGNIFICANT CHANGE UP (ref 4.2–5.8)
RBC # FLD: 15.6 % — HIGH (ref 10.3–14.5)
RBC BLD AUTO: NORMAL — SIGNIFICANT CHANGE UP
RBC CASTS # UR COMP ASSIST: SIGNIFICANT CHANGE UP /HPF (ref 0–4)
RSV RNA NPH QL NAA+NON-PROBE: SIGNIFICANT CHANGE UP COUNTS
SARS-COV-2 RNA SPEC QL NAA+PROBE: SIGNIFICANT CHANGE UP COUNTS
SODIUM SERPL-SCNC: 140 MMOL/L — SIGNIFICANT CHANGE UP (ref 135–145)
SP GR SPEC: 1.01 — SIGNIFICANT CHANGE UP (ref 1.01–1.02)
UROBILINOGEN FLD QL: NEGATIVE MG/DL — SIGNIFICANT CHANGE UP
WBC # BLD: 2.5 K/UL — LOW (ref 3.8–10.5)
WBC # FLD AUTO: 2.5 K/UL — LOW (ref 3.8–10.5)
WBC UR QL: SIGNIFICANT CHANGE UP

## 2020-12-10 PROCEDURE — 99285 EMERGENCY DEPT VISIT HI MDM: CPT

## 2020-12-10 PROCEDURE — 99222 1ST HOSP IP/OBS MODERATE 55: CPT

## 2020-12-10 PROCEDURE — 71045 X-RAY EXAM CHEST 1 VIEW: CPT | Mod: 26

## 2020-12-10 PROCEDURE — 93010 ELECTROCARDIOGRAM REPORT: CPT

## 2020-12-10 RX ORDER — SODIUM CHLORIDE 9 MG/ML
1000 INJECTION INTRAMUSCULAR; INTRAVENOUS; SUBCUTANEOUS ONCE
Refills: 0 | Status: COMPLETED | OUTPATIENT
Start: 2020-12-10 | End: 2020-12-10

## 2020-12-10 RX ORDER — ATORVASTATIN CALCIUM 80 MG/1
1 TABLET, FILM COATED ORAL
Qty: 0 | Refills: 0 | DISCHARGE

## 2020-12-10 RX ORDER — ATORVASTATIN CALCIUM 80 MG/1
1 TABLET, FILM COATED ORAL
Qty: 30 | Refills: 0

## 2020-12-10 RX ORDER — THIAMINE MONONITRATE (VIT B1) 100 MG
100 TABLET ORAL DAILY
Refills: 0 | Status: DISCONTINUED | OUTPATIENT
Start: 2020-12-10 | End: 2020-12-11

## 2020-12-10 RX ORDER — ACETAMINOPHEN 500 MG
650 TABLET ORAL ONCE
Refills: 0 | Status: COMPLETED | OUTPATIENT
Start: 2020-12-10 | End: 2020-12-10

## 2020-12-10 RX ORDER — ATORVASTATIN CALCIUM 80 MG/1
20 TABLET, FILM COATED ORAL AT BEDTIME
Refills: 0 | Status: DISCONTINUED | OUTPATIENT
Start: 2020-12-10 | End: 2020-12-11

## 2020-12-10 RX ORDER — PANTOPRAZOLE SODIUM 20 MG/1
40 TABLET, DELAYED RELEASE ORAL
Refills: 0 | Status: DISCONTINUED | OUTPATIENT
Start: 2020-12-10 | End: 2020-12-11

## 2020-12-10 RX ORDER — FOLIC ACID 0.8 MG
1 TABLET ORAL DAILY
Refills: 0 | Status: DISCONTINUED | OUTPATIENT
Start: 2020-12-10 | End: 2020-12-11

## 2020-12-10 RX ORDER — METOPROLOL TARTRATE 50 MG
1 TABLET ORAL
Qty: 0 | Refills: 0 | DISCHARGE

## 2020-12-10 RX ORDER — KETOROLAC TROMETHAMINE 30 MG/ML
15 SYRINGE (ML) INJECTION ONCE
Refills: 0 | Status: DISCONTINUED | OUTPATIENT
Start: 2020-12-10 | End: 2020-12-10

## 2020-12-10 RX ORDER — MORPHINE SULFATE 50 MG/1
2 CAPSULE, EXTENDED RELEASE ORAL EVERY 6 HOURS
Refills: 0 | Status: DISCONTINUED | OUTPATIENT
Start: 2020-12-10 | End: 2020-12-11

## 2020-12-10 RX ORDER — METOPROLOL TARTRATE 50 MG
25 TABLET ORAL
Qty: 0 | Refills: 0 | DISCHARGE

## 2020-12-10 RX ORDER — SODIUM CHLORIDE 9 MG/ML
1000 INJECTION INTRAMUSCULAR; INTRAVENOUS; SUBCUTANEOUS
Refills: 0 | Status: DISCONTINUED | OUTPATIENT
Start: 2020-12-10 | End: 2020-12-11

## 2020-12-10 RX ORDER — PANTOPRAZOLE SODIUM 20 MG/1
1 TABLET, DELAYED RELEASE ORAL
Qty: 0 | Refills: 0 | DISCHARGE

## 2020-12-10 RX ADMIN — SODIUM CHLORIDE 1000 MILLILITER(S): 9 INJECTION INTRAMUSCULAR; INTRAVENOUS; SUBCUTANEOUS at 15:00

## 2020-12-10 RX ADMIN — SODIUM CHLORIDE 100 MILLILITER(S): 9 INJECTION INTRAMUSCULAR; INTRAVENOUS; SUBCUTANEOUS at 20:09

## 2020-12-10 RX ADMIN — MORPHINE SULFATE 2 MILLIGRAM(S): 50 CAPSULE, EXTENDED RELEASE ORAL at 20:28

## 2020-12-10 RX ADMIN — Medication 25 MILLIGRAM(S): at 14:41

## 2020-12-10 RX ADMIN — Medication 2 MILLIGRAM(S): at 21:28

## 2020-12-10 RX ADMIN — SODIUM CHLORIDE 1000 MILLILITER(S): 9 INJECTION INTRAMUSCULAR; INTRAVENOUS; SUBCUTANEOUS at 14:05

## 2020-12-10 RX ADMIN — Medication 2 MILLIGRAM(S): at 17:11

## 2020-12-10 RX ADMIN — Medication 2 MILLIGRAM(S): at 14:05

## 2020-12-10 RX ADMIN — Medication 15 MILLIGRAM(S): at 14:40

## 2020-12-10 RX ADMIN — ATORVASTATIN CALCIUM 20 MILLIGRAM(S): 80 TABLET, FILM COATED ORAL at 21:28

## 2020-12-10 RX ADMIN — Medication 15 MILLIGRAM(S): at 15:10

## 2020-12-10 RX ADMIN — MORPHINE SULFATE 2 MILLIGRAM(S): 50 CAPSULE, EXTENDED RELEASE ORAL at 20:08

## 2020-12-10 NOTE — SBIRT NOTE ADULT - NSSBIRTBRIEFINTDET_GEN_A_CORE
Provided SBIRT services: Full screen positive. Referral to Treatment attempted. Screening results were reviewed with the patient and patient was provided information about healthy guidelines and potential negative consequences associated with level of risk. Motivation and readiness to reduce or stop use was discussed and goals and activities to make changes were suggested/offered. Options discussed for further evaluation and treatment, but referral to treatment was not completed because: Patient does not believe use is an issue. Patient reported that PCP is aware of alcohol use and that no other treatment is needed. If patient changes his mind and would like support to reduce/stop alcohol use, please reach out to SBIRT: [phone].

## 2020-12-10 NOTE — ED ADULT TRIAGE NOTE - NS ED TRIAGE AVPU SCALE
Alert-The patient is alert, awake and responds to voice. The patient is oriented to time, place, and person. The triage nurse is able to obtain subjective information. DISCHARGE

## 2020-12-10 NOTE — H&P ADULT - ASSESSMENT
55 year old male patient with findings consistent with alcohol withdrawal      -Admit to Medsurg          # Alcohol Withdrawal   -start ciwa protocol  -IVF hydration  -give folic acid, multivitamin  -low threshold for higher level of care if pt scoring despite abobe interventions 55 year old male patient with findings consistent with alcohol withdrawal      -Admit to Medsurg          # Alcohol Withdrawal   -start ciwa protocol  -IVF hydration  -give folic acid, multivitamin  -low threshold for higher level of care if pt scoring despite abobe interventions    #Viral syndrome  -Covid test negative  -tylenol prn  -IVF hydration  -supportive care    #Hx of Gastritis / PUD  -on protonix    #Anemia  -likely secondary to alcohol abuse  -H&H stable  -periodically trend cbc    #Advanced Directives  -Full code    #DVT ppx  -encourage early ambulation

## 2020-12-10 NOTE — ED PROVIDER NOTE - CARE PLAN
Principal Discharge DX:	Alcohol withdrawal syndrome without complication  Secondary Diagnosis:	Viral syndrome

## 2020-12-10 NOTE — H&P ADULT - NSHPPHYSICALEXAM_GEN_ALL_CORE
Vital Signs Last 24 Hrs  T(C): 36.7 (10 Dec 2020 15:50), Max: 36.8 (10 Dec 2020 11:46)  T(F): 98 (10 Dec 2020 15:50), Max: 98.3 (10 Dec 2020 11:46)  HR: 89 (10 Dec 2020 15:50) (89 - 110)  BP: 135/66 (10 Dec 2020 15:50) (135/66 - 159/112)  BP(mean): --  RR: 19 (10 Dec 2020 15:50) (19 - 20)  SpO2: 98% (10 Dec 2020 15:50) (98% - 98%) Vital Signs Last 24 Hrs  T(C): 36.7 (10 Dec 2020 15:50), Max: 36.8 (10 Dec 2020 11:46)  T(F): 98 (10 Dec 2020 15:50), Max: 98.3 (10 Dec 2020 11:46)  HR: 89 (10 Dec 2020 15:50) (89 - 110)  BP: 135/66 (10 Dec 2020 15:50) (135/66 - 159/112)  BP(mean): --  RR: 19 (10 Dec 2020 15:50) (19 - 20)  SpO2: 98% (10 Dec 2020 15:50) (98% - 98%)    GENERAL: NAD well-developed  HEAD:  Atraumatic, Normocephalic  EYES: EOMI, PERRLA, conjunctiva and sclera clear  ENMT: No tonsillar erythema, exudates, or enlargement; Moist mucous membranes, Good dentition, No lesions  NECK: Supple, No JVD, Normal thyroid  NERVOUS SYSTEM:  Alert & Oriented X3, Good concentration; Motor Strength 5/5 B/L upper and lower extremities; DTRs 2+ intact and symmetric  CHEST/LUNG: Clear to percussion bilaterally; No rales, rhonchi, wheezing, or rubs  HEART: Regular rate and rhythm; No murmurs, rubs, or gallops  ABDOMEN: Soft, Nontender, Nondistended; Bowel sounds present  EXTREMITIES:  2+ Peripheral Pulses, No clubbing, cyanosis, or edema  LYMPH: No lymphadenopathy   SKIN: No rashes or lesions

## 2020-12-10 NOTE — H&P ADULT - HISTORY OF PRESENT ILLNESS
55 year old male patient with pertinent history of Alcohol abuse 55 year old male patient with pertinent history of Alcohol abuse( Endorses binge drinking two a week) presented to the ED complaining of fevers( Tmax 101), myalgias, dry cough, nausea, vomiting and diffuse abdominal pain. Patient last drank on 12/ 5/2020- Drinks varying quantity of Whiskey. Denies any sick contacts, sob, diarrhea, headache, hematemesis.      In the ED patient with negative Covid test and CXR without infiltrates or effusions . Patient with symptoms consistent with withdrawal

## 2020-12-10 NOTE — ED ADULT TRIAGE NOTE - CHIEF COMPLAINT QUOTE
c/o fever since morning feeling shaky yesterday denies cough c/o shortness of breath c/o body aches hx etoh abuse last drink 3-4 days ago b/l hand tremors slight n/v

## 2020-12-10 NOTE — ED PROVIDER NOTE - CLINICAL SUMMARY MEDICAL DECISION MAKING FREE TEXT BOX
VS wnl. patient clinically in etoh withdrawal. Ativan 2mg, librium ordered. will evlauate for viral infection, bacterial causews of potential infection. non septic in ED. will require admission for etoh withdrawal

## 2020-12-10 NOTE — ED PROVIDER NOTE - OBJECTIVE STATEMENT
55m pmhx ETOH abuse, last drink 4 days ago. 1 day of fever, body aches and sob. lewis reports fever of 101 at home.

## 2020-12-10 NOTE — ED PROVIDER NOTE - ENMT, MLM
Airway patent, Nasal mucosa clear. Mouth with normal mucosa. tongue with high frequency fasciculations

## 2020-12-11 ENCOUNTER — TRANSCRIPTION ENCOUNTER (OUTPATIENT)
Age: 55
End: 2020-12-11

## 2020-12-11 VITALS
OXYGEN SATURATION: 97 % | RESPIRATION RATE: 17 BRPM | SYSTOLIC BLOOD PRESSURE: 136 MMHG | DIASTOLIC BLOOD PRESSURE: 85 MMHG | TEMPERATURE: 99 F | HEART RATE: 97 BPM

## 2020-12-11 LAB
ALBUMIN SERPL ELPH-MCNC: 3.4 G/DL — SIGNIFICANT CHANGE UP (ref 3.3–5)
ALP SERPL-CCNC: 35 U/L — LOW (ref 40–120)
ALT FLD-CCNC: 41 U/L — SIGNIFICANT CHANGE UP (ref 12–78)
ANION GAP SERPL CALC-SCNC: 6 MMOL/L — SIGNIFICANT CHANGE UP (ref 5–17)
AST SERPL-CCNC: 50 U/L — HIGH (ref 15–37)
BILIRUB SERPL-MCNC: 1 MG/DL — SIGNIFICANT CHANGE UP (ref 0.2–1.2)
BUN SERPL-MCNC: 14 MG/DL — SIGNIFICANT CHANGE UP (ref 7–23)
CALCIUM SERPL-MCNC: 8 MG/DL — LOW (ref 8.5–10.1)
CHLORIDE SERPL-SCNC: 108 MMOL/L — SIGNIFICANT CHANGE UP (ref 96–108)
CO2 SERPL-SCNC: 26 MMOL/L — SIGNIFICANT CHANGE UP (ref 22–31)
CREAT SERPL-MCNC: 0.77 MG/DL — SIGNIFICANT CHANGE UP (ref 0.5–1.3)
CRP SERPL-MCNC: <0.1 MG/DL — SIGNIFICANT CHANGE UP (ref 0–0.4)
FERRITIN SERPL-MCNC: 46 NG/ML — SIGNIFICANT CHANGE UP (ref 30–400)
GLUCOSE SERPL-MCNC: 86 MG/DL — SIGNIFICANT CHANGE UP (ref 70–99)
HCT VFR BLD CALC: 33.9 % — LOW (ref 39–50)
HGB BLD-MCNC: 10.7 G/DL — LOW (ref 13–17)
INR BLD: 1.05 RATIO — SIGNIFICANT CHANGE UP (ref 0.88–1.16)
MAGNESIUM SERPL-MCNC: 1.7 MG/DL — SIGNIFICANT CHANGE UP (ref 1.6–2.6)
MCHC RBC-ENTMCNC: 27.4 PG — SIGNIFICANT CHANGE UP (ref 27–34)
MCHC RBC-ENTMCNC: 31.6 GM/DL — LOW (ref 32–36)
MCV RBC AUTO: 86.9 FL — SIGNIFICANT CHANGE UP (ref 80–100)
NRBC # BLD: 0 /100 WBCS — SIGNIFICANT CHANGE UP (ref 0–0)
PHOSPHATE SERPL-MCNC: 2.8 MG/DL — SIGNIFICANT CHANGE UP (ref 2.5–4.5)
PLATELET # BLD AUTO: 228 K/UL — SIGNIFICANT CHANGE UP (ref 150–400)
POTASSIUM SERPL-MCNC: 3.7 MMOL/L — SIGNIFICANT CHANGE UP (ref 3.5–5.3)
POTASSIUM SERPL-SCNC: 3.7 MMOL/L — SIGNIFICANT CHANGE UP (ref 3.5–5.3)
PROCALCITONIN SERPL-MCNC: 0.03 NG/ML — SIGNIFICANT CHANGE UP (ref 0.02–0.1)
PROT SERPL-MCNC: 6.9 GM/DL — SIGNIFICANT CHANGE UP (ref 6–8.3)
PROTHROM AB SERPL-ACNC: 12.1 SEC — SIGNIFICANT CHANGE UP (ref 10.6–13.6)
RBC # BLD: 3.9 M/UL — LOW (ref 4.2–5.8)
RBC # FLD: 15.3 % — HIGH (ref 10.3–14.5)
SODIUM SERPL-SCNC: 140 MMOL/L — SIGNIFICANT CHANGE UP (ref 135–145)
WBC # BLD: 2.93 K/UL — LOW (ref 3.8–10.5)
WBC # FLD AUTO: 2.93 K/UL — LOW (ref 3.8–10.5)

## 2020-12-11 PROCEDURE — 99239 HOSP IP/OBS DSCHRG MGMT >30: CPT

## 2020-12-11 RX ORDER — SIMVASTATIN 20 MG/1
1 TABLET, FILM COATED ORAL
Qty: 0 | Refills: 0 | DISCHARGE

## 2020-12-11 RX ADMIN — Medication 2 MILLIGRAM(S): at 06:11

## 2020-12-11 RX ADMIN — Medication 1 TABLET(S): at 11:09

## 2020-12-11 RX ADMIN — SODIUM CHLORIDE 100 MILLILITER(S): 9 INJECTION INTRAMUSCULAR; INTRAVENOUS; SUBCUTANEOUS at 11:10

## 2020-12-11 RX ADMIN — MORPHINE SULFATE 2 MILLIGRAM(S): 50 CAPSULE, EXTENDED RELEASE ORAL at 13:39

## 2020-12-11 RX ADMIN — Medication 2 MILLIGRAM(S): at 10:00

## 2020-12-11 RX ADMIN — Medication 2 MILLIGRAM(S): at 01:22

## 2020-12-11 RX ADMIN — SODIUM CHLORIDE 100 MILLILITER(S): 9 INJECTION INTRAMUSCULAR; INTRAVENOUS; SUBCUTANEOUS at 06:11

## 2020-12-11 RX ADMIN — Medication 100 MILLIGRAM(S): at 11:09

## 2020-12-11 RX ADMIN — Medication 1 MILLIGRAM(S): at 11:09

## 2020-12-11 RX ADMIN — Medication 1.5 MILLIGRAM(S): at 14:34

## 2020-12-11 RX ADMIN — PANTOPRAZOLE SODIUM 40 MILLIGRAM(S): 20 TABLET, DELAYED RELEASE ORAL at 06:11

## 2020-12-11 RX ADMIN — MORPHINE SULFATE 2 MILLIGRAM(S): 50 CAPSULE, EXTENDED RELEASE ORAL at 14:18

## 2020-12-11 NOTE — DISCHARGE NOTE PROVIDER - HOSPITAL COURSE
55 year old male patient with pertinent history of Alcohol abuse( Endorses binge drinking two a week) presented to the ED complaining of fevers( Tmax 101), myalgias, dry cough, nausea, vomiting and diffuse abdominal pain. Patient last drank on 12/ 5/2020- Drinks varying quantity of Whiskey. Denies any sick contacts, sob, diarrhea, headache, hematemesis.    Patient remains afebrile during admission, patient can discharge home today   
hard copy, drawn during this pregnancy

## 2020-12-11 NOTE — DISCHARGE NOTE PROVIDER - NSDCMRMEDTOKEN_GEN_ALL_CORE_FT
atorvastatin 20 mg oral tablet: 1 tab(s) orally once a day (at bedtime)  folic acid 1 mg oral tablet: 1 tab(s) orally once a day  magnesium oxide 500 mg oral tablet: 1 tab(s) orally once a day  Multiple Vitamins oral tablet: 1 tab(s) orally once a day  omeprazole 40 mg oral delayed release capsule: 1 cap(s) orally once a day  pantoprazole 20 mg oral delayed release tablet: 1 tab(s) orally once a day  thiamine 100 mg oral tablet: 1 tab(s) orally once a day

## 2020-12-11 NOTE — DISCHARGE NOTE PROVIDER - CARE PROVIDER_API CALL
Miko Pelayo  INTERNAL MEDICINE  [address] [place] [address], [address]  [place] [address]  Phone: [phone]  Fax: [phone]  Follow Up Time:

## 2020-12-11 NOTE — DISCHARGE NOTE NURSING/CASE MANAGEMENT/SOCIAL WORK - PATIENT PORTAL LINK FT
You can access the FollowMyHealth Patient Portal offered by [facility] by registering at the following website: [email or web address]. By joining [facility]’s FollowMyHealth portal, you will also be able to view your health information using other applications (apps) compatible with our system.

## 2020-12-12 LAB
SARS-COV-2 IGG SERPL QL IA: NEGATIVE — SIGNIFICANT CHANGE UP
SARS-COV-2 IGM SERPL IA-ACNC: <0.1 INDEX — SIGNIFICANT CHANGE UP

## 2020-12-15 DIAGNOSIS — F10.230 ALCOHOL DEPENDENCE WITH WITHDRAWAL, UNCOMPLICATED: ICD-10-CM

## 2020-12-15 DIAGNOSIS — E78.2 MIXED HYPERLIPIDEMIA: ICD-10-CM
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
## 2020-12-15 DIAGNOSIS — D63.8 ANEMIA IN OTHER CHRONIC DISEASES CLASSIFIED ELSEWHERE: ICD-10-CM

## 2020-12-15 DIAGNOSIS — K27.9 PEPTIC ULCER, SITE UNSPECIFIED, UNSPECIFIED AS ACUTE OR CHRONIC, WITHOUT HEMORRHAGE OR PERFORATION: ICD-10-CM

## 2020-12-15 DIAGNOSIS — B34.9 VIRAL INFECTION, UNSPECIFIED: ICD-10-CM

## 2020-12-21 ENCOUNTER — INPATIENT (INPATIENT)
Facility: HOSPITAL | Age: 55
LOS: 5 days | Discharge: HOME | End: 2020-12-27
Attending: HOSPITALIST | Admitting: HOSPITALIST
Payer: MEDICAID

## 2020-12-21 VITALS
HEART RATE: 121 BPM | SYSTOLIC BLOOD PRESSURE: 151 MMHG | RESPIRATION RATE: 20 BRPM | WEIGHT: 156.97 LBS | DIASTOLIC BLOOD PRESSURE: 94 MMHG | OXYGEN SATURATION: 97 % | HEIGHT: 67 IN | TEMPERATURE: 98 F

## 2020-12-21 DIAGNOSIS — F10.230 ALCOHOL DEPENDENCE WITH WITHDRAWAL, UNCOMPLICATED: ICD-10-CM

## 2020-12-21 DIAGNOSIS — U07.1 COVID-19: ICD-10-CM

## 2020-12-21 DIAGNOSIS — R11.2 NAUSEA WITH VOMITING, UNSPECIFIED: ICD-10-CM

## 2020-12-21 DIAGNOSIS — Z29.9 ENCOUNTER FOR PROPHYLACTIC MEASURES, UNSPECIFIED: ICD-10-CM

## 2020-12-21 LAB
ALBUMIN SERPL ELPH-MCNC: 4.7 G/DL — SIGNIFICANT CHANGE UP (ref 3.3–5)
ALP SERPL-CCNC: 56 U/L — SIGNIFICANT CHANGE UP (ref 40–120)
ALT FLD-CCNC: 100 U/L — HIGH (ref 12–78)
AMMONIA BLD-MCNC: 71 UMOL/L — HIGH (ref 11–32)
ANION GAP SERPL CALC-SCNC: 19 MMOL/L — HIGH (ref 5–17)
ANISOCYTOSIS BLD QL: SLIGHT — SIGNIFICANT CHANGE UP
APAP SERPL-MCNC: < 2 UG/ML (ref 10–30)
APTT BLD: 26.3 SEC — LOW (ref 27.5–35.5)
AST SERPL-CCNC: 118 U/L — HIGH (ref 15–37)
BASE EXCESS BLDA CALC-SCNC: 1.4 MMOL/L — SIGNIFICANT CHANGE UP (ref -2–2)
BASOPHILS # BLD AUTO: 0 K/UL — SIGNIFICANT CHANGE UP (ref 0–0.2)
BASOPHILS NFR BLD AUTO: 0 % — SIGNIFICANT CHANGE UP (ref 0–2)
BILIRUB DIRECT SERPL-MCNC: 0.15 MG/DL — SIGNIFICANT CHANGE UP (ref 0.05–0.2)
BILIRUB INDIRECT FLD-MCNC: 0.4 MG/DL — SIGNIFICANT CHANGE UP (ref 0.2–1)
BILIRUB SERPL-MCNC: 0.5 MG/DL — SIGNIFICANT CHANGE UP (ref 0.2–1.2)
BLD GP AB SCN SERPL QL: SIGNIFICANT CHANGE UP
BLOOD GAS COMMENTS: SIGNIFICANT CHANGE UP
BLOOD GAS COMMENTS: SIGNIFICANT CHANGE UP
BLOOD GAS SOURCE: SIGNIFICANT CHANGE UP
BUN SERPL-MCNC: 14 MG/DL — SIGNIFICANT CHANGE UP (ref 7–23)
CALCIUM SERPL-MCNC: 9.8 MG/DL — SIGNIFICANT CHANGE UP (ref 8.5–10.1)
CHLORIDE SERPL-SCNC: 94 MMOL/L — LOW (ref 96–108)
CO2 SERPL-SCNC: 26 MMOL/L — SIGNIFICANT CHANGE UP (ref 22–31)
CREAT SERPL-MCNC: 1.21 MG/DL — SIGNIFICANT CHANGE UP (ref 0.5–1.3)
EOSINOPHIL # BLD AUTO: 0 K/UL — SIGNIFICANT CHANGE UP (ref 0–0.5)
EOSINOPHIL NFR BLD AUTO: 0 % — SIGNIFICANT CHANGE UP (ref 0–6)
ETHANOL SERPL-MCNC: 202 MG/DL — HIGH (ref 0–10)
FLUAV AG NPH QL: SIGNIFICANT CHANGE UP COUNTS
FLUBV AG NPH QL: SIGNIFICANT CHANGE UP COUNTS
GLUCOSE BLDC GLUCOMTR-MCNC: 73 MG/DL — SIGNIFICANT CHANGE UP (ref 70–99)
GLUCOSE SERPL-MCNC: 80 MG/DL — SIGNIFICANT CHANGE UP (ref 70–99)
HCO3 BLDA-SCNC: 24 MMOL/L — SIGNIFICANT CHANGE UP (ref 21–29)
HCT VFR BLD CALC: 42.2 % — SIGNIFICANT CHANGE UP (ref 39–50)
HGB BLD-MCNC: 13.9 G/DL — SIGNIFICANT CHANGE UP (ref 13–17)
HOROWITZ INDEX BLDA+IHG-RTO: 21 — SIGNIFICANT CHANGE UP
INR BLD: 0.97 RATIO — SIGNIFICANT CHANGE UP (ref 0.88–1.16)
LACTATE SERPL-SCNC: 3.2 MMOL/L — HIGH (ref 0.7–2)
LACTATE SERPL-SCNC: 3.9 MMOL/L — HIGH (ref 0.7–2)
LACTATE SERPL-SCNC: 7.9 MMOL/L — CRITICAL HIGH (ref 0.7–2)
LIDOCAIN IGE QN: 244 U/L — SIGNIFICANT CHANGE UP (ref 73–393)
LYMPHOCYTES # BLD AUTO: 1.31 K/UL — SIGNIFICANT CHANGE UP (ref 1–3.3)
LYMPHOCYTES # BLD AUTO: 54 % — HIGH (ref 13–44)
MAGNESIUM SERPL-MCNC: 1.9 MG/DL — SIGNIFICANT CHANGE UP (ref 1.6–2.6)
MANUAL SMEAR VERIFICATION: SIGNIFICANT CHANGE UP
MCHC RBC-ENTMCNC: 27.1 PG — SIGNIFICANT CHANGE UP (ref 27–34)
MCHC RBC-ENTMCNC: 32.9 GM/DL — SIGNIFICANT CHANGE UP (ref 32–36)
MCV RBC AUTO: 82.3 FL — SIGNIFICANT CHANGE UP (ref 80–100)
MONOCYTES # BLD AUTO: 0.17 K/UL — SIGNIFICANT CHANGE UP (ref 0–0.9)
MONOCYTES NFR BLD AUTO: 7 % — SIGNIFICANT CHANGE UP (ref 2–14)
NEUTROPHILS # BLD AUTO: 0.85 K/UL — LOW (ref 1.8–7.4)
NEUTROPHILS NFR BLD AUTO: 32 % — LOW (ref 43–77)
NEUTS BAND # BLD: 3 % — SIGNIFICANT CHANGE UP (ref 0–8)
NRBC # BLD: 0 /100 — SIGNIFICANT CHANGE UP (ref 0–0)
NRBC # BLD: SIGNIFICANT CHANGE UP /100 WBCS (ref 0–0)
PCO2 BLDA: 32 MMHG — SIGNIFICANT CHANGE UP (ref 32–46)
PH BLD: 7.49 — HIGH (ref 7.35–7.45)
PHOSPHATE SERPL-MCNC: 2.2 MG/DL — LOW (ref 2.5–4.5)
PLAT MORPH BLD: NORMAL — SIGNIFICANT CHANGE UP
PLATELET # BLD AUTO: 238 K/UL — SIGNIFICANT CHANGE UP (ref 150–400)
PO2 BLDA: 96 MMHG — SIGNIFICANT CHANGE UP (ref 74–108)
POTASSIUM SERPL-MCNC: 3.4 MMOL/L — LOW (ref 3.5–5.3)
POTASSIUM SERPL-SCNC: 3.4 MMOL/L — LOW (ref 3.5–5.3)
PROT SERPL-MCNC: 9.8 GM/DL — HIGH (ref 6–8.3)
PROTHROM AB SERPL-ACNC: 11.3 SEC — SIGNIFICANT CHANGE UP (ref 10.6–13.6)
RBC # BLD: 5.13 M/UL — SIGNIFICANT CHANGE UP (ref 4.2–5.8)
RBC # FLD: 15.3 % — HIGH (ref 10.3–14.5)
RBC BLD AUTO: ABNORMAL
RSV RNA NPH QL NAA+NON-PROBE: SIGNIFICANT CHANGE UP COUNTS
SAO2 % BLDA: 98 % — HIGH (ref 92–96)
SARS-COV-2 RNA SPEC QL NAA+PROBE: DETECTED COUNTS
SODIUM SERPL-SCNC: 139 MMOL/L — SIGNIFICANT CHANGE UP (ref 135–145)
VARIANT LYMPHS # BLD: 4 % — SIGNIFICANT CHANGE UP (ref 0–6)
WBC # BLD: 2.43 K/UL — LOW (ref 3.8–10.5)
WBC # FLD AUTO: 2.43 K/UL — LOW (ref 3.8–10.5)

## 2020-12-21 PROCEDURE — 70450 CT HEAD/BRAIN W/O DYE: CPT | Mod: 26

## 2020-12-21 PROCEDURE — 74177 CT ABD & PELVIS W/CONTRAST: CPT | Mod: 26,MH

## 2020-12-21 PROCEDURE — 99285 EMERGENCY DEPT VISIT HI MDM: CPT

## 2020-12-21 PROCEDURE — 71045 X-RAY EXAM CHEST 1 VIEW: CPT | Mod: 26

## 2020-12-21 PROCEDURE — 99222 1ST HOSP IP/OBS MODERATE 55: CPT

## 2020-12-21 PROCEDURE — 72125 CT NECK SPINE W/O DYE: CPT | Mod: 26

## 2020-12-21 PROCEDURE — 93010 ELECTROCARDIOGRAM REPORT: CPT

## 2020-12-21 RX ORDER — ATORVASTATIN CALCIUM 80 MG/1
20 TABLET, FILM COATED ORAL AT BEDTIME
Refills: 0 | Status: DISCONTINUED | OUTPATIENT
Start: 2020-12-21 | End: 2020-12-27

## 2020-12-21 RX ORDER — FOLIC ACID 0.8 MG
1 TABLET ORAL DAILY
Refills: 0 | Status: DISCONTINUED | OUTPATIENT
Start: 2020-12-21 | End: 2020-12-27

## 2020-12-21 RX ORDER — SODIUM CHLORIDE 9 MG/ML
1000 INJECTION INTRAMUSCULAR; INTRAVENOUS; SUBCUTANEOUS
Refills: 0 | Status: DISCONTINUED | OUTPATIENT
Start: 2020-12-21 | End: 2020-12-22

## 2020-12-21 RX ORDER — POTASSIUM CHLORIDE 20 MEQ
20 PACKET (EA) ORAL ONCE
Refills: 0 | Status: COMPLETED | OUTPATIENT
Start: 2020-12-21 | End: 2020-12-21

## 2020-12-21 RX ORDER — SODIUM CHLORIDE 9 MG/ML
1000 INJECTION INTRAMUSCULAR; INTRAVENOUS; SUBCUTANEOUS ONCE
Refills: 0 | Status: COMPLETED | OUTPATIENT
Start: 2020-12-21 | End: 2020-12-21

## 2020-12-21 RX ORDER — SODIUM CHLORIDE 9 MG/ML
1000 INJECTION, SOLUTION INTRAVENOUS
Refills: 0 | Status: DISCONTINUED | OUTPATIENT
Start: 2020-12-21 | End: 2020-12-22

## 2020-12-21 RX ORDER — ONDANSETRON 8 MG/1
4 TABLET, FILM COATED ORAL EVERY 6 HOURS
Refills: 0 | Status: DISCONTINUED | OUTPATIENT
Start: 2020-12-21 | End: 2020-12-27

## 2020-12-21 RX ORDER — PANTOPRAZOLE SODIUM 20 MG/1
40 TABLET, DELAYED RELEASE ORAL
Refills: 0 | Status: DISCONTINUED | OUTPATIENT
Start: 2020-12-22 | End: 2020-12-23

## 2020-12-21 RX ORDER — METOCLOPRAMIDE HCL 10 MG
10 TABLET ORAL ONCE
Refills: 0 | Status: COMPLETED | OUTPATIENT
Start: 2020-12-21 | End: 2020-12-21

## 2020-12-21 RX ORDER — KETOROLAC TROMETHAMINE 30 MG/ML
30 SYRINGE (ML) INJECTION ONCE
Refills: 0 | Status: DISCONTINUED | OUTPATIENT
Start: 2020-12-21 | End: 2020-12-21

## 2020-12-21 RX ORDER — THIAMINE MONONITRATE (VIT B1) 100 MG
500 TABLET ORAL ONCE
Refills: 0 | Status: COMPLETED | OUTPATIENT
Start: 2020-12-21 | End: 2020-12-21

## 2020-12-21 RX ORDER — THIAMINE MONONITRATE (VIT B1) 100 MG
100 TABLET ORAL DAILY
Refills: 0 | Status: DISCONTINUED | OUTPATIENT
Start: 2020-12-21 | End: 2020-12-27

## 2020-12-21 RX ORDER — PANTOPRAZOLE SODIUM 20 MG/1
8 TABLET, DELAYED RELEASE ORAL
Qty: 80 | Refills: 0 | Status: DISCONTINUED | OUTPATIENT
Start: 2020-12-21 | End: 2020-12-21

## 2020-12-21 RX ORDER — ONDANSETRON 8 MG/1
4 TABLET, FILM COATED ORAL ONCE
Refills: 0 | Status: COMPLETED | OUTPATIENT
Start: 2020-12-21 | End: 2020-12-21

## 2020-12-21 RX ADMIN — SODIUM CHLORIDE 1000 MILLILITER(S): 9 INJECTION INTRAMUSCULAR; INTRAVENOUS; SUBCUTANEOUS at 18:57

## 2020-12-21 RX ADMIN — SODIUM CHLORIDE 112 MILLILITER(S): 9 INJECTION, SOLUTION INTRAVENOUS at 17:30

## 2020-12-21 RX ADMIN — ONDANSETRON 4 MILLIGRAM(S): 8 TABLET, FILM COATED ORAL at 16:41

## 2020-12-21 RX ADMIN — Medication 50 MILLIGRAM(S): at 21:41

## 2020-12-21 RX ADMIN — Medication 30 MILLIGRAM(S): at 20:42

## 2020-12-21 RX ADMIN — SODIUM CHLORIDE 1000 MILLILITER(S): 9 INJECTION INTRAMUSCULAR; INTRAVENOUS; SUBCUTANEOUS at 19:22

## 2020-12-21 RX ADMIN — SODIUM CHLORIDE 1000 MILLILITER(S): 9 INJECTION INTRAMUSCULAR; INTRAVENOUS; SUBCUTANEOUS at 16:41

## 2020-12-21 RX ADMIN — SODIUM CHLORIDE 1000 MILLILITER(S): 9 INJECTION INTRAMUSCULAR; INTRAVENOUS; SUBCUTANEOUS at 17:58

## 2020-12-21 RX ADMIN — Medication 2 MILLIGRAM(S): at 21:43

## 2020-12-21 RX ADMIN — PANTOPRAZOLE SODIUM 10 MG/HR: 20 TABLET, DELAYED RELEASE ORAL at 17:30

## 2020-12-21 RX ADMIN — Medication 105 MILLIGRAM(S): at 17:30

## 2020-12-21 RX ADMIN — SODIUM CHLORIDE 1000 MILLILITER(S): 9 INJECTION INTRAMUSCULAR; INTRAVENOUS; SUBCUTANEOUS at 17:40

## 2020-12-21 RX ADMIN — Medication 2 MILLIGRAM(S): at 16:41

## 2020-12-21 RX ADMIN — Medication 10 MILLIGRAM(S): at 19:32

## 2020-12-21 RX ADMIN — Medication 500 MILLIGRAM(S): at 18:30

## 2020-12-21 NOTE — H&P ADULT - PROBLEM SELECTOR PLAN 1
- pt coffee ground emesis likely due to alcohol induced gastritis  - PPI IV BID  - NPO  - GI eval in am  - IVF  - monitor H/H  - LA likely due to vomiting, dehydration, c/w IVF, trending down.

## 2020-12-21 NOTE — H&P ADULT - NSHPPHYSICALEXAM_GEN_ALL_CORE
PHYSICAL EXAM:    Vital Signs Last 24 Hrs  T(C): 37.1 (21 Dec 2020 20:40), Max: 37.1 (21 Dec 2020 20:40)  T(F): 98.7 (21 Dec 2020 20:40), Max: 98.7 (21 Dec 2020 20:40)  HR: 90 (21 Dec 2020 20:40) (87 - 121)  BP: 118/63 (21 Dec 2020 20:40) (118/63 - 151/94)  BP(mean): --  RR: 18 (21 Dec 2020 20:40) (15 - 22)  SpO2: 97% (21 Dec 2020 20:40) (94% - 97%)    GENERAL: Pt lying in bed comfortably in NAD  HEENT:  Atraumatic, EOMI, PERRL, conjunctiva and sclera clear, MMM  NECK: Supple, No JVD  CHEST/LUNG: Clear to auscultation bilaterally; No rales, rhonchi, wheezing or rubs. Unlabored respirations  HEART: Regular rate and rhythm; No murmurs, rubs, or gallops  ABDOMEN: Bowel sounds present; Soft, Nontender, Nondistended. No guarding or rigidity    EXTREMITIES:  2+ Peripheral Pulses, brisk capillary refill. No clubbing, cyanosis, or edema  NEUROLOGICAL:  Alert & Oriented X3, speech clear. Answers questions appropriately. Full and equal strength B/L upper and lower extremities. No deficits   MSK: FROM x 4 extremities   SKIN: No rashes or lesions PHYSICAL EXAM:    Vital Signs Last 24 Hrs  T(C): 37.1 (21 Dec 2020 20:40), Max: 37.1 (21 Dec 2020 20:40)  T(F): 98.7 (21 Dec 2020 20:40), Max: 98.7 (21 Dec 2020 20:40)  HR: 90 (21 Dec 2020 20:40) (87 - 121)  BP: 118/63 (21 Dec 2020 20:40) (118/63 - 151/94)  BP(mean): --  RR: 18 (21 Dec 2020 20:40) (15 - 22)  SpO2: 97% (21 Dec 2020 20:40) (94% - 97%)    GENERAL: Pt lying in bed comfortably in NAD  HEENT:  Atraumatic, EOMI, PERRL, conjunctiva and sclera clear, MMM  NECK: Supple  CHEST/LUNG: Clear to auscultation bilaterally Unlabored respirations  HEART: Regular rate and rhythm  ABDOMEN: Bowel sounds present Soft, diffusely tender, Nondistended.   EXTREMITIES:  2+ Peripheral Pulses, brisk capillary refill. No edema  NEUROLOGICAL:  Alert & Oriented X3,. No focal deficits   MSK: FROM x 4 extremities   SKIN: No rashes or lesions

## 2020-12-21 NOTE — ED PROVIDER NOTE - PHYSICAL EXAMINATION
VITALS: reviewed  GEN: +discomfort secondary to abdominal pain, A & O x 4  HEAD/EYES: NCAT, PERRL, EOMI, anicteric sclerae, no conjunctival pallor  ENT: mucus membranes moist, oropharynx WNL, trachea midline, no JVD, +tongue fixation   RESP: lungs CTA with equal breath sounds bilaterally, chest wall nontender and atraumatic  CV: heart with reg rhythm S1, S2, no murmur; distal pulses intact and symmetric bilaterally  ABDOMEN: normoactive bowel sounds, soft, nondistended, nontender, no palpable masses  : no CVAT, +Dark red emesis   MSK: extremities atraumatic and nontender, no edema, no asymmetry. the back is without midline or lateral tenderness, there is no spinal deformity or stepoff and the back is ranged painlessly. the neck has no midline tenderness, deformity, or stepoff, and is ranged painlessly.  SKIN: warm, dry, no rash, no bruising, no cyanosis. color appropriate for ethnicity  NEURO: alert, mentating appropriately, no facial asymmetry. gross sensation, motor, coordination are intact. +Bilateral hand tremors   PSYCH: Affect appropriate

## 2020-12-21 NOTE — ED ADULT NURSE NOTE - NSIMPLEMENTINTERV_GEN_ALL_ED
Implemented All Fall Risk Interventions:  [place] to call system. Call bell, personal items and telephone within reach. Instruct patient to call for assistance. Room bathroom lighting operational. Non-slip footwear when patient is off stretcher. Physically safe environment: no spills, clutter or unnecessary equipment. Stretcher in lowest position, wheels locked, appropriate side rails in place. Provide visual cue, wrist band, yellow gown, etc. Monitor gait and stability. Monitor for mental status changes and reorient to person, place, and time. Review medications for side effects contributing to fall risk. Reinforce activity limits and safety measures with patient and family.

## 2020-12-21 NOTE — ED PROVIDER NOTE - PROGRESS NOTE DETAILS
Eileen: no updated CIWA score, however patient with improved vital signs, improved tremors, well appearing. 2nd dose of ativan written for expected worsening sx Eileen: no updated CIWA score, however patient with improved vital signs, improved tremors, well appearing. reassessed now- normal vital signs, well appearing, c/o pain-tx toradol

## 2020-12-21 NOTE — ED ADULT NURSE REASSESSMENT NOTE - NS ED NURSE REASSESS COMMENT FT1
received report from PRETTY Thorne. pt on the bed alert and oriented pt identified self introduced. hooked to cardiac monitor. safety measures checked.

## 2020-12-21 NOTE — H&P ADULT - ASSESSMENT
56 y/o M with PMHx of EtOH abuse, Gastritis and DT's presents to the ED nausea and vomiting x 1 week. Pt being admitted w/ gastritis associated w/ coffee ground emesis, COVID +ve.     IMPROVE VTE Individual Risk Assessment    RISK                                                                Points    [  ] Previous VTE                                                  3    [  ] Thrombophilia                                               2    [  ] Lower limb paralysis                                      2        (unable to hold up >15 seconds)      [  ] Current Cancer                                              2         (within 6 months)    [  ] Immobilization > 24 hrs                                1    [  ] ICU/CCU stay > 24 hours                              1    [  ] Age > 60                                                      1    IMPROVE VTE Score ___0______    IMPROVE Score 0-1: Low Risk, No VTE prophylaxis required for most patients, encourage ambulation.   IMPROVE Score 2-3: At risk, pharmacologic VTE prophylaxis is indicated for most patients (in the absence of a contraindication)  IMPROVE Score > or = 4: High Risk, pharmacologic VTE prophylaxis is indicated for most patients (in the absence of a contraindication)

## 2020-12-21 NOTE — H&P ADULT - NSHPLABSRESULTS_GEN_ALL_CORE
T(C): 37.1 (12-21-20 @ 20:40), Max: 37.1 (12-21-20 @ 20:40)  HR: 90 (12-21-20 @ 20:40) (87 - 121)  BP: 118/63 (12-21-20 @ 20:40) (118/63 - 151/94)  RR: 18 (12-21-20 @ 20:40) (15 - 22)  SpO2: 97% (12-21-20 @ 20:40) (94% - 97%)                        13.9   2.43  )-----------( 238      ( 21 Dec 2020 16:56 )             42.2     12-21    139  |  94<L>  |  14  ----------------------------<  80  3.4<L>   |  26  |  1.21    Ca    9.8      21 Dec 2020 16:56  Phos  2.2     12-21  Mg     1.9     12-21    TPro  9.8<H>  /  Alb  4.7  /  TBili  0.5  /  DBili  .15  /  AST  118<H>  /  ALT  100<H>  /  AlkPhos  56  12-21    LIVER FUNCTIONS - ( 21 Dec 2020 16:56 )  Alb: 4.7 g/dL / Pro: 9.8 gm/dL / ALK PHOS: 56 U/L / ALT: 100 U/L / AST: 118 U/L / GGT: x           PT/INR - ( 21 Dec 2020 16:56 )   PT: 11.3 sec;   INR: 0.97 ratio         PTT - ( 21 Dec 2020 16:56 )  PTT:26.3 sec        chlordiazePOXIDE 50 milliGRAM(s) Oral every 6 hours  chlordiazePOXIDE   Oral   dextrose 5% + sodium chloride 0.9%. 1000 milliLiter(s) IV Continuous <Continuous>  LORazepam   Injectable 1 milliGRAM(s) IV Push every 1 hour PRN  LORazepam   Injectable 2 milliGRAM(s) IV Push every 1 hour PRN  ondansetron Injectable 4 milliGRAM(s) IV Push every 6 hours PRN  sodium chloride 0.9%. 1000 milliLiter(s) IV Continuous <Continuous> T(C): 37.1 (12-21-20 @ 20:40), Max: 37.1 (12-21-20 @ 20:40)  HR: 90 (12-21-20 @ 20:40) (87 - 121)  BP: 118/63 (12-21-20 @ 20:40) (118/63 - 151/94)  RR: 18 (12-21-20 @ 20:40) (15 - 22)  SpO2: 97% (12-21-20 @ 20:40) (94% - 97%)                        13.9   2.43  )-----------( 238      ( 21 Dec 2020 16:56 )             42.2     12-21    139  |  94<L>  |  14  ----------------------------<  80  3.4<L>   |  26  |  1.21    Ca    9.8      21 Dec 2020 16:56  Phos  2.2     12-21  Mg     1.9     12-21    TPro  9.8<H>  /  Alb  4.7  /  TBili  0.5  /  DBili  .15  /  AST  118<H>  /  ALT  100<H>  /  AlkPhos  56  12-21    LIVER FUNCTIONS - ( 21 Dec 2020 16:56 )  Alb: 4.7 g/dL / Pro: 9.8 gm/dL / ALK PHOS: 56 U/L / ALT: 100 U/L / AST: 118 U/L / GGT: x           PT/INR - ( 21 Dec 2020 16:56 )   PT: 11.3 sec;   INR: 0.97 ratio         PTT - ( 21 Dec 2020 16:56 )  PTT:26.3 sec    < from: CT Head No Cont (12.21.20 @ 19:05) >    IMPRESSION:    CT head: No acute intracranial hemorrhage or mass effect.    CT cervical spine: No evidence for acute displaced fracture or malalignment.  4 mm left upper lobe nodule.    < end of copied text >    < from: CT Abdomen and Pelvis w/ IV Cont (12.21.20 @ 19:05) >    IMPRESSION:  1. No bowel obstruction, significant ascites orfree air.  2. Severe hepatic steatosis.  3. Colonic diverticulosis without acute diverticulitis.  4. The stomach is underdistended for adequate evaluation, and gastric wall thickening cannot be excluded. There is clinical concern, further evaluation with endoscopy can be obtained.    < end of copied text >        chlordiazePOXIDE 50 milliGRAM(s) Oral every 6 hours  chlordiazePOXIDE   Oral   dextrose 5% + sodium chloride 0.9%. 1000 milliLiter(s) IV Continuous <Continuous>  LORazepam   Injectable 1 milliGRAM(s) IV Push every 1 hour PRN  LORazepam   Injectable 2 milliGRAM(s) IV Push every 1 hour PRN  ondansetron Injectable 4 milliGRAM(s) IV Push every 6 hours PRN  sodium chloride 0.9%. 1000 milliLiter(s) IV Continuous <Continuous>

## 2020-12-21 NOTE — ED PROVIDER NOTE - CARE PLAN
Principal Discharge DX:	Nausea & vomiting  Secondary Diagnosis:	Alcohol withdrawal  Secondary Diagnosis:	COVID-19

## 2020-12-21 NOTE — ED PROVIDER NOTE - NS ED ROS FT
CONST: no fevers, no chills, no trauma  EYES: no pain, no visual disturbances  ENT: no sore throat, no epistaxis, no rhinorrhea, no hearing changes  CV: no chest pain, no palpitations, no orthopnea, no extremity pain or swelling  RESP: no shortness of breath, no cough, no sputum, no pleurisy, no wheezing  ABD: +abdominal pain, +nausea, +vomiting, no diarrhea, no black or bloody stool  : no dysuria, no hematuria, no frequency, no urgency  MSK: no back pain, no neck pain, no extremity pain, +leg pain  NEURO: no headache, no sensory disturbances, no focal weakness, no dizziness, +paresthesia in finger tips  HEME: no easy bleeding or bruising  SKIN: no diaphoresis, no rash

## 2020-12-21 NOTE — ED PROVIDER NOTE - OBJECTIVE STATEMENT
56 y/o M with a PMHx of known EtOH abuse, Gastritis and DT's presents to the ED c/o emesis w/ blood x2 days after having nausea and vomiting x1 week. Patient also reports having diffuse abdominal pain, leg pain and paresthesia in his finger tips. Patient states he has been barely able to tolerate PO intake. Denies any chest pain. Patient notes he has a family member who is COVID sick. Patient also states he fell 2 weeks ago while in the bathroom. 56 y/o M with a PMHx of known EtOH abuse, Gastritis and DT's presents to the ED c/o emesis w/ blood x2 days after having nausea and vomiting x1 week. Patient also reports having diffuse abdominal pain, leg pain and paresthesia in his finger tips. Patient states he has been barely able to tolerate PO intake. Denies any chest pain. Patient notes he has a family member who has COVID symptoms. Patient also states he fell 2 weeks ago while in the bathroom, complaining of HA. Denies AH/VH. Denies seizures.

## 2020-12-21 NOTE — H&P ADULT - HISTORY OF PRESENT ILLNESS
54 y/o M with PMHx of EtOH abuse, Gastritis and DT's presents to the ED nausea and vomiting x 1 week. Patient reports emesis was initially yellow then noted some dark blood in the last 2 days. Pt also c/o difuse diffuse abdominal pain, joint pains and myalgias. Patient notes he has a family member who has COVID symptoms. Pt denies fever, chills, cough, chest pain, dizziness, diarrhea or dysuria. Patient also states he fell 2 weeks ago while in the bathroom, complaining of HA. Pt reports last drink 3 days ago as he has not been able to keep anything down      54 y/o M with PMHx of EtOH abuse, Gastritis and DT's presents to the ED nausea and vomiting x 1 week. Patient reports emesis was initially yellow then noted some dark blood in the last 2 days. Pt also c/o difuse diffuse abdominal pain, joint pains and myalgias. Patient notes he has a family member who has COVID symptoms. Pt denies fever, chills, cough, chest pain, dizziness, diarrhea or dysuria. Patient also states he fell 2 weeks ago while in the bathroom, complaining of HA. Pt reports last drink 3 days ago as he has not been able to keep anything down.    In ED initial vitals show , BP mildly elevated, rest of vitals stable. For sig labs see below. Pan CT neg for acute pathology. COVID+ ve CXR neg. Pt given PPI, K, IVF and ativan.       56 y/o M with PMHx of EtOH abuse, Gastritis and DT's recurrent admissions for abdominal pain, etoh w/drawal; last admitted earlier this month presents to the ED nausea and vomiting x 1 week. Patient reports emesis was initially yellow then noted some dark blood in the last 2 days. Pt also c/o difuse diffuse abdominal pain, joint pains and myalgias. Patient notes he has a family member who has COVID symptoms. Pt denies fever, chills, cough, chest pain, dizziness, diarrhea or dysuria. Patient also states he fell 2 weeks ago while in the bathroom, complaining of HA. Pt reports last drink 3 days ago as he has not been able to keep anything down. Unknown when last scoped.     In ED initial vitals show , BP mildly elevated, rest of vitals stable. For sig labs see below. Pan CT neg for acute pathology. COVID+ ve CXR neg. Pt given PPI, K, IVF and ativan.

## 2020-12-21 NOTE — ED ADULT NURSE NOTE - OBJECTIVE STATEMENT
Patient presents to the ED complaining of weakness and tremors to b/l hands.  Per ,  last alcoholic drink was one week. Pt complaining of weakness and severe headache. States that he fell 2 weeks ago in the bathroom and was so weak he could not get himself up. 4-5days no bowel movement. While in ED, patient threw up coffee ground emesis about 300cc

## 2020-12-21 NOTE — ED PROVIDER NOTE - CLINICAL SUMMARY MEDICAL DECISION MAKING FREE TEXT BOX
54 y/o M with nausea and vomiting. CIWA 15. Patient was given ativan with improvement. Concerned for alcoholic ketoacidosis as Patient has not been eating or drinking. Will r/o COVID. Treat with metoprolol. Rule out perforated ulcer versus low diverticula bleed. GI consulted. To be admitted.

## 2020-12-22 LAB
ALBUMIN SERPL ELPH-MCNC: 3.2 G/DL — LOW (ref 3.3–5)
ALP SERPL-CCNC: 33 U/L — LOW (ref 40–120)
ALT FLD-CCNC: 65 U/L — SIGNIFICANT CHANGE UP (ref 12–78)
AMPHET UR-MCNC: NEGATIVE — SIGNIFICANT CHANGE UP
ANION GAP SERPL CALC-SCNC: 9 MMOL/L — SIGNIFICANT CHANGE UP (ref 5–17)
ANION GAP SERPL CALC-SCNC: 9 MMOL/L — SIGNIFICANT CHANGE UP (ref 5–17)
AST SERPL-CCNC: 68 U/L — HIGH (ref 15–37)
BARBITURATES UR SCN-MCNC: NEGATIVE — SIGNIFICANT CHANGE UP
BASOPHILS # BLD AUTO: 0.01 K/UL — SIGNIFICANT CHANGE UP (ref 0–0.2)
BASOPHILS NFR BLD AUTO: 0.4 % — SIGNIFICANT CHANGE UP (ref 0–2)
BENZODIAZ UR-MCNC: POSITIVE — SIGNIFICANT CHANGE UP
BILIRUB SERPL-MCNC: 0.8 MG/DL — SIGNIFICANT CHANGE UP (ref 0.2–1.2)
BUN SERPL-MCNC: 11 MG/DL — SIGNIFICANT CHANGE UP (ref 7–23)
BUN SERPL-MCNC: 12 MG/DL — SIGNIFICANT CHANGE UP (ref 7–23)
CALCIUM SERPL-MCNC: 7.5 MG/DL — LOW (ref 8.5–10.1)
CALCIUM SERPL-MCNC: 7.5 MG/DL — LOW (ref 8.5–10.1)
CHLORIDE SERPL-SCNC: 109 MMOL/L — HIGH (ref 96–108)
CHLORIDE SERPL-SCNC: 109 MMOL/L — HIGH (ref 96–108)
CO2 SERPL-SCNC: 24 MMOL/L — SIGNIFICANT CHANGE UP (ref 22–31)
CO2 SERPL-SCNC: 25 MMOL/L — SIGNIFICANT CHANGE UP (ref 22–31)
COCAINE METAB.OTHER UR-MCNC: NEGATIVE — SIGNIFICANT CHANGE UP
CREAT SERPL-MCNC: 0.72 MG/DL — SIGNIFICANT CHANGE UP (ref 0.5–1.3)
CREAT SERPL-MCNC: 0.78 MG/DL — SIGNIFICANT CHANGE UP (ref 0.5–1.3)
EOSINOPHIL # BLD AUTO: 0.04 K/UL — SIGNIFICANT CHANGE UP (ref 0–0.5)
EOSINOPHIL NFR BLD AUTO: 1.7 % — SIGNIFICANT CHANGE UP (ref 0–6)
GLUCOSE BLDC GLUCOMTR-MCNC: 116 MG/DL — HIGH (ref 70–99)
GLUCOSE BLDC GLUCOMTR-MCNC: 86 MG/DL — SIGNIFICANT CHANGE UP (ref 70–99)
GLUCOSE SERPL-MCNC: 64 MG/DL — LOW (ref 70–99)
GLUCOSE SERPL-MCNC: 66 MG/DL — LOW (ref 70–99)
HCT VFR BLD CALC: 32.4 % — LOW (ref 39–50)
HGB BLD-MCNC: 10.2 G/DL — LOW (ref 13–17)
IMM GRANULOCYTES NFR BLD AUTO: 0 % — SIGNIFICANT CHANGE UP (ref 0–1.5)
LYMPHOCYTES # BLD AUTO: 1.3 K/UL — SIGNIFICANT CHANGE UP (ref 1–3.3)
LYMPHOCYTES # BLD AUTO: 55.1 % — HIGH (ref 13–44)
MAGNESIUM SERPL-MCNC: 1.5 MG/DL — LOW (ref 1.6–2.6)
MAGNESIUM SERPL-MCNC: 1.5 MG/DL — LOW (ref 1.6–2.6)
MCHC RBC-ENTMCNC: 26.8 PG — LOW (ref 27–34)
MCHC RBC-ENTMCNC: 31.5 GM/DL — LOW (ref 32–36)
MCV RBC AUTO: 85.3 FL — SIGNIFICANT CHANGE UP (ref 80–100)
METHADONE UR-MCNC: NEGATIVE — SIGNIFICANT CHANGE UP
MONOCYTES # BLD AUTO: 0.27 K/UL — SIGNIFICANT CHANGE UP (ref 0–0.9)
MONOCYTES NFR BLD AUTO: 11.4 % — SIGNIFICANT CHANGE UP (ref 2–14)
NEUTROPHILS # BLD AUTO: 0.74 K/UL — LOW (ref 1.8–7.4)
NEUTROPHILS NFR BLD AUTO: 31.4 % — LOW (ref 43–77)
NRBC # BLD: 0 /100 WBCS — SIGNIFICANT CHANGE UP (ref 0–0)
OPIATES UR-MCNC: NEGATIVE — SIGNIFICANT CHANGE UP
PCP SPEC-MCNC: SIGNIFICANT CHANGE UP
PCP UR-MCNC: NEGATIVE — SIGNIFICANT CHANGE UP
PHOSPHATE SERPL-MCNC: 1.5 MG/DL — LOW (ref 2.5–4.5)
PHOSPHATE SERPL-MCNC: 1.6 MG/DL — LOW (ref 2.5–4.5)
PLATELET # BLD AUTO: 146 K/UL — LOW (ref 150–400)
POTASSIUM SERPL-MCNC: 3.7 MMOL/L — SIGNIFICANT CHANGE UP (ref 3.5–5.3)
POTASSIUM SERPL-MCNC: 3.7 MMOL/L — SIGNIFICANT CHANGE UP (ref 3.5–5.3)
POTASSIUM SERPL-SCNC: 3.7 MMOL/L — SIGNIFICANT CHANGE UP (ref 3.5–5.3)
POTASSIUM SERPL-SCNC: 3.7 MMOL/L — SIGNIFICANT CHANGE UP (ref 3.5–5.3)
PROT SERPL-MCNC: 6.5 GM/DL — SIGNIFICANT CHANGE UP (ref 6–8.3)
RBC # BLD: 3.8 M/UL — LOW (ref 4.2–5.8)
RBC # FLD: 15.3 % — HIGH (ref 10.3–14.5)
SARS-COV-2 IGG SERPL QL IA: NEGATIVE — SIGNIFICANT CHANGE UP
SARS-COV-2 IGM SERPL IA-ACNC: <0.1 INDEX — SIGNIFICANT CHANGE UP
SODIUM SERPL-SCNC: 142 MMOL/L — SIGNIFICANT CHANGE UP (ref 135–145)
SODIUM SERPL-SCNC: 143 MMOL/L — SIGNIFICANT CHANGE UP (ref 135–145)
THC UR QL: NEGATIVE — SIGNIFICANT CHANGE UP
WBC # BLD: 2.36 K/UL — LOW (ref 3.8–10.5)
WBC # FLD AUTO: 2.36 K/UL — LOW (ref 3.8–10.5)

## 2020-12-22 PROCEDURE — 99232 SBSQ HOSP IP/OBS MODERATE 35: CPT

## 2020-12-22 RX ORDER — POTASSIUM CHLORIDE 20 MEQ
10 PACKET (EA) ORAL
Refills: 0 | Status: DISCONTINUED | OUTPATIENT
Start: 2020-12-22 | End: 2020-12-22

## 2020-12-22 RX ORDER — SODIUM CHLORIDE 9 MG/ML
1000 INJECTION INTRAMUSCULAR; INTRAVENOUS; SUBCUTANEOUS
Refills: 0 | Status: DISCONTINUED | OUTPATIENT
Start: 2020-12-22 | End: 2020-12-22

## 2020-12-22 RX ORDER — POTASSIUM CHLORIDE 20 MEQ
10 PACKET (EA) ORAL
Refills: 0 | Status: COMPLETED | OUTPATIENT
Start: 2020-12-22 | End: 2020-12-22

## 2020-12-22 RX ORDER — MAGNESIUM SULFATE 500 MG/ML
1 VIAL (ML) INJECTION ONCE
Refills: 0 | Status: COMPLETED | OUTPATIENT
Start: 2020-12-22 | End: 2020-12-22

## 2020-12-22 RX ADMIN — Medication 2 MILLIGRAM(S): at 17:20

## 2020-12-22 RX ADMIN — SODIUM CHLORIDE 125 MILLILITER(S): 9 INJECTION INTRAMUSCULAR; INTRAVENOUS; SUBCUTANEOUS at 00:01

## 2020-12-22 RX ADMIN — Medication 100 MILLIEQUIVALENT(S): at 01:40

## 2020-12-22 RX ADMIN — Medication 100 MILLIEQUIVALENT(S): at 04:11

## 2020-12-22 RX ADMIN — PANTOPRAZOLE SODIUM 40 MILLIGRAM(S): 20 TABLET, DELAYED RELEASE ORAL at 17:12

## 2020-12-22 RX ADMIN — Medication 1 MILLIGRAM(S): at 11:01

## 2020-12-22 RX ADMIN — Medication 100 MILLIGRAM(S): at 11:01

## 2020-12-22 RX ADMIN — ATORVASTATIN CALCIUM 20 MILLIGRAM(S): 80 TABLET, FILM COATED ORAL at 00:35

## 2020-12-22 RX ADMIN — Medication 50 MILLIGRAM(S): at 00:34

## 2020-12-22 RX ADMIN — Medication 1 MILLIGRAM(S): at 00:34

## 2020-12-22 RX ADMIN — Medication 50 MILLIGRAM(S): at 11:01

## 2020-12-22 RX ADMIN — Medication 100 GRAM(S): at 11:01

## 2020-12-22 RX ADMIN — ATORVASTATIN CALCIUM 20 MILLIGRAM(S): 80 TABLET, FILM COATED ORAL at 21:24

## 2020-12-22 RX ADMIN — Medication 50 MILLIGRAM(S): at 21:24

## 2020-12-22 RX ADMIN — Medication 100 MILLIEQUIVALENT(S): at 00:34

## 2020-12-22 RX ADMIN — ONDANSETRON 4 MILLIGRAM(S): 8 TABLET, FILM COATED ORAL at 22:34

## 2020-12-22 RX ADMIN — Medication 50 MILLIGRAM(S): at 05:11

## 2020-12-22 RX ADMIN — Medication 1 TABLET(S): at 11:01

## 2020-12-22 RX ADMIN — PANTOPRAZOLE SODIUM 40 MILLIGRAM(S): 20 TABLET, DELAYED RELEASE ORAL at 05:11

## 2020-12-22 RX ADMIN — Medication 2 MILLIGRAM(S): at 22:34

## 2020-12-22 NOTE — CONSULT NOTE ADULT - ASSESSMENT
HPI:       56 y/o M with PMHx of EtOH abuse, Gastritis and DT's recurrent admissions for abdominal pain, etoh w/drawal; last admitted earlier this month presents to the ED nausea and vomiting x 1 week. Patient reports emesis was initially yellow then noted some dark blood in the last 2 days. Pt also c/o difuse diffuse abdominal pain, joint pains and myalgias. Patient notes he has a family member who has COVID symptoms. Pt denies fever, chills, cough, chest pain, dizziness, diarrhea or dysuria. Patient also states he fell 2 weeks ago while in the bathroom, complaining of HA. Pt reports last drink 3 days ago as he has not been able to keep anything down. Unknown when last scoped.     In ED initial vitals show , BP mildly elevated, rest of vitals stable. For sig labs see below. Pan CT neg for acute pathology. COVID+ ve CXR neg. Pt given PPI, K, IVF and ativan.  (21 Dec 2020 20:15)  ------- As Above -------------------------  Patient is a poor historian. Patient known to my service. Presented with coffee ground emesis and abdominal pain. N/V started 3 days ago. Melena (?). ETOH abuse (+). Denies NSAIDs.   See labs / CT Scans    Coffee ground emesis - Work up in the past revealed gastritis. No signs of active bleeding.  HPI:       54 y/o M with PMHx of EtOH abuse, Gastritis and DT's recurrent admissions for abdominal pain, etoh w/drawal; last admitted earlier this month presents to the ED nausea and vomiting x 1 week. Patient reports emesis was initially yellow then noted some dark blood in the last 2 days. Pt also c/o difuse diffuse abdominal pain, joint pains and myalgias. Patient notes he has a family member who has COVID symptoms. Pt denies fever, chills, cough, chest pain, dizziness, diarrhea or dysuria. Patient also states he fell 2 weeks ago while in the bathroom, complaining of HA. Pt reports last drink 3 days ago as he has not been able to keep anything down. Unknown when last scoped.     In ED initial vitals show , BP mildly elevated, rest of vitals stable. For sig labs see below. Pan CT neg for acute pathology. COVID+ ve CXR neg. Pt given PPI, K, IVF and ativan.  (21 Dec 2020 20:15)  ------- As Above -------------------------  Patient is a poor historian. Patient known to my service. Presented with coffee ground emesis and abdominal pain. N/V started 3 days ago. Melena (?). ETOH abuse (+). Denies NSAIDs.   See labs / CT Scans    Coffee ground emesis - Work up in the past revealed gastritis. No signs of active bleeding. Patient on solid diet. 1) IV PPI 2) Patient is not a candidate for EGD ( Covid ) -- Supportive care for now HPI:       56 y/o M with PMHx of EtOH abuse, Gastritis and DT's recurrent admissions for abdominal pain, etoh w/drawal; last admitted earlier this month presents to the ED nausea and vomiting x 1 week. Patient reports emesis was initially yellow then noted some dark blood in the last 2 days. Pt also c/o difuse diffuse abdominal pain, joint pains and myalgias. Patient notes he has a family member who has COVID symptoms. Pt denies fever, chills, cough, chest pain, dizziness, diarrhea or dysuria. Patient also states he fell 2 weeks ago while in the bathroom, complaining of HA. Pt reports last drink 3 days ago as he has not been able to keep anything down. Unknown when last scoped.     In ED initial vitals show , BP mildly elevated, rest of vitals stable. For sig labs see below. Pan CT neg for acute pathology. COVID+ ve CXR neg. Pt given PPI, K, IVF and ativan.  (21 Dec 2020 20:15)  ------- As Above -------------------------  Patient is a poor historian. Patient known to my service. Presented with coffee ground emesis and abdominal pain. N/V started 3 days ago. Melena (?). ETOH abuse (+). Denies NSAIDs.   See labs / CT Scans    Coffee ground emesis - Work up in the past revealed gastritis. No signs of active bleeding. Patient on solid diet. 1) IV PPI 2) Patient is not a candidate for EGD ( Covid ) -- Supportive care for now  Elevated LFTs - improving, c/w alcoholic hepatitis  - 0.5/118/100/56 --> 0.8/68/65/33

## 2020-12-22 NOTE — CONSULT NOTE ADULT - SUBJECTIVE AND OBJECTIVE BOX
HPI:       56 y/o M with PMHx of EtOH abuse, Gastritis and DT's recurrent admissions for abdominal pain, etoh w/drawal; last admitted earlier this month presents to the ED nausea and vomiting x 1 week. Patient reports emesis was initially yellow then noted some dark blood in the last 2 days. Pt also c/o difuse diffuse abdominal pain, joint pains and myalgias. Patient notes he has a family member who has COVID symptoms. Pt denies fever, chills, cough, chest pain, dizziness, diarrhea or dysuria. Patient also states he fell 2 weeks ago while in the bathroom, complaining of HA. Pt reports last drink 3 days ago as he has not been able to keep anything down. Unknown when last scoped.     In ED initial vitals show , BP mildly elevated, rest of vitals stable. For sig labs see below. Pan CT neg for acute pathology. COVID+ ve CXR neg. Pt given PPI, K, IVF and ativan.  (21 Dec 2020 20:15)  ------- As Above -------------------------  Patient is a poor historian. Patient known to my service. Presented with coffee ground emesis and abdominal pain. N/V started 3 days ago. Melena (?). ETOH abuse (+). Denies NSAIDs.   See labs / CT Scans      PAST MEDICAL & SURGICAL HISTORY:  DTs (delirium tremens)    Substance abuse    Gastritis    EtOH dependence    Mixed hyperlipidemia    PUD (peptic ulcer disease)    No significant past surgical history        MEDICATIONS  (STANDING):  atorvastatin 20 milliGRAM(s) Oral at bedtime  chlordiazePOXIDE 50 milliGRAM(s) Oral every 8 hours  chlordiazePOXIDE   Oral   folic acid 1 milliGRAM(s) Oral daily  multivitamin 1 Tablet(s) Oral daily  pantoprazole  Injectable 40 milliGRAM(s) IV Push two times a day  thiamine 100 milliGRAM(s) Oral daily    MEDICATIONS  (PRN):  LORazepam   Injectable 1 milliGRAM(s) IV Push every 1 hour PRN CIWA 7 or less  LORazepam   Injectable 2 milliGRAM(s) IV Push every 1 hour PRN CIWA 8 or greater  ondansetron Injectable 4 milliGRAM(s) IV Push every 6 hours PRN Nausea and/or Vomiting      Allergies    No Known Allergies    Intolerances        FAMILY HISTORY:  No pertinent family history in first degree relatives        REVIEW OF SYSTEMS:    CONSTITUTIONAL: No fever, weight loss,   EYES: No eye pain, visual disturbances, or discharge  ENMT:  No difficulty hearing, tinnitus, vertigo; No sinus or throat pain  NECK: No pain or stiffness  BREASTS: No pain, masses, or nipple discharge  RESPIRATORY: No cough, wheezing, chills or hemoptysis; No shortness of breath  CARDIOVASCULAR: No chest pain, palpitations, dizziness, or leg swelling  GASTROINTESTINAL:  See above.  GENITOURINARY: No dysuria, frequency, hematuria, or incontinence  NEUROLOGICAL: No headaches, memory loss, loss of strength, numbness, or tremors  SKIN: No itching, burning, rashes, or lesions   LYMPH NODES: No enlarged glands  ENDOCRINE: No heat or cold intolerance; No hair loss  MUSCULOSKELETAL: No joint pain or swelling; No muscle, back, or extremity pain  PSYCHIATRIC: No depression, anxiety, mood swings, or difficulty sleeping  HEME/LYMPH: No easy bruising, or bleeding gums  ALLERGY AND IMMUNOLOGIC: No hives or eczema          SOCIAL HISTORY:    FAMILY HISTORY:  No pertinent family history in first degree relatives        Vital Signs Last 24 Hrs  T(C): 36.4 (22 Dec 2020 11:09), Max: 37.1 (21 Dec 2020 20:40)  T(F): 97.6 (22 Dec 2020 11:09), Max: 98.7 (21 Dec 2020 20:40)  HR: 82 (22 Dec 2020 11:09) (68 - 90)  BP: 127/83 (22 Dec 2020 11:09) (113/70 - 141/89)  BP(mean): --  RR: 18 (22 Dec 2020 11:09) (17 - 22)  SpO2: 97% (22 Dec 2020 11:09) (94% - 98%)    PHYSICAL EXAM:    GENERAL: NAD, well-groomed, well-developed  HEAD:  Atraumatic, Normocephalic  EYES: EOMI, PERRLA, conjunctiva and sclera clear  NECK: Supple, No JVD, Normal thyroid  NERVOUS SYSTEM:  Alert & Oriented X 2, Good concentration; Motor Strength 5/5 B/L upper and lower extremities;   CHEST/LUNG: Clear to percussion bilaterally; No rales, rhonchi, wheezing, or rubs  HEART: Regular rate and rhythm; No murmurs, rubs, or gallops  ABDOMEN: Soft, Nontender, Nondistended; Bowel sounds present  EXTREMITIES:  2+ Peripheral Pulses, No clubbing, cyanosis, or edema  LYMPH: No lymphadenopathy noted   RECTAL: Deferred  SKIN: No rashes or lesions    LABS:                        10.2   2.36  )-----------( 146      ( 22 Dec 2020 08:47 )             32.4       CBC:  12-22 @ 08:47  WBC  2.36  HGB 10.2  HCT 32.4 Plate 146  MCV 85.3  12-21 @ 16:56  WBC  2.43  HGB 13.9  HCT 42.2 Plate 238  MCV 82.3           22 Dec 2020 08:47    143    |  109    |  11     ----------------------------<  66     3.7     |  25     |  0.78   21 Dec 2020 16:56    139    |  94     |  14     ----------------------------<  80     3.4     |  26     |  1.21     Ca    7.5        22 Dec 2020 08:47  Ca    9.8        21 Dec 2020 16:56  Phos  1.6       22 Dec 2020 08:47  Phos  2.2       21 Dec 2020 16:56  Mg     1.5       22 Dec 2020 08:47  Mg     1.9       21 Dec 2020 16:56    TPro  6.5    /  Alb  3.2    /  TBili  0.8    /  DBili  x      /  AST  68     /  ALT  65     /  AlkPhos  33     22 Dec 2020 08:47  TPro  9.8    /  Alb  4.7    /  TBili  0.5    /  DBili  .15    /  AST  118    /  ALT  100    /  AlkPhos  56     21 Dec 2020 16:56    PT/INR - ( 21 Dec 2020 16:56 )   PT: 11.3 sec;   INR: 0.97 ratio         PTT - ( 21 Dec 2020 16:56 )  PTT:26.3 sec        RADIOLOGY & ADDITIONAL STUDIES:  < from: CT Abdomen and Pelvis w/ IV Cont (12.21.20 @ 19:05) >    EXAM:  CT ABDOMEN AND PELVIS IC                            PROCEDURE DATE:  12/21/2020          INTERPRETATION:  CLINICAL INFORMATION:  bloody emesis    COMPARISON: CT abdomen and pelvis 11/17/2020.    PROCEDURE:  Multiple contiguous axial CT images of the abdomen and pelvis obtained following the administration of 90 mL's of Omnipaque 350. Coronal and sagittal reformatted images provided.    FINDINGS:  LUNG BASES: Stable tiny lingular pulmonary nodule, 2:7. New small pleural-based nodular opacity of the right middle lobe, 2:11 may be vascular atelectasis. Minimal atelectasis left lung base, 2:11.  LIVER: Severe hepatic steatosis.  BILE DUCTS: Normal caliber.  GALLBLADDER: No calcified gallstone..  SPLEEN: Within normal limits.  PANCREAS: Within normal limits.  ADRENALS: Tiny focus of fat within the right adrenal gland suggestive of an adrenal gland myelolipoma.  KIDNEYS/URETERS: Kidneys enhance symmetrically without hydronephrosis. Ureters are not dilated..    BLADDER: Collapsed.  REPRODUCTIVE ORGANS: Prostate gland not grossly enlarged.    BOWEL: Evaluation of bowel limited without distention with oral contrast, however there is no bowel obstruction. Colonic diverticulosis without acute diverticulitis. Appendix is within normal limits. There is fatty infiltration of the submucosal layer of the right colon. The stomach is underdistended for adequate evaluation, and gastric wall thickening cannot be excluded.. Small bowel loops are not dilated.  PERITONEUM: No ascites or free air.  VESSELS:  No abdominal aortic aneurysm or dissection..  RETROPERITONEUM: No lymphadenopathy.  ABDOMINAL WALL: Tiny fat-containing right inguinal hernia..  BONES: Within normal limits.    IMPRESSION:  1. No bowel obstruction, significant ascites orfree air.  2. Severe hepatic steatosis.  3. Colonic diverticulosis without acute diverticulitis.  4. The stomach is underdistended for adequate evaluation, and gastric wall thickening cannot be excluded. There is clinical concern, further evaluation with endoscopy can be obtained.            REY FOSTER MD; Attending Radiologist  This document has been electronically signed. Dec 21 2020  7:46PM    < end of copied text >

## 2020-12-23 DIAGNOSIS — E83.39 OTHER DISORDERS OF PHOSPHORUS METABOLISM: ICD-10-CM

## 2020-12-23 DIAGNOSIS — E83.42 HYPOMAGNESEMIA: ICD-10-CM

## 2020-12-23 DIAGNOSIS — F10.20 ALCOHOL DEPENDENCE, UNCOMPLICATED: ICD-10-CM

## 2020-12-23 LAB
ANION GAP SERPL CALC-SCNC: 9 MMOL/L — SIGNIFICANT CHANGE UP (ref 5–17)
BUN SERPL-MCNC: 10 MG/DL — SIGNIFICANT CHANGE UP (ref 7–23)
CALCIUM SERPL-MCNC: 8.9 MG/DL — SIGNIFICANT CHANGE UP (ref 8.5–10.1)
CHLORIDE SERPL-SCNC: 105 MMOL/L — SIGNIFICANT CHANGE UP (ref 96–108)
CO2 SERPL-SCNC: 26 MMOL/L — SIGNIFICANT CHANGE UP (ref 22–31)
CREAT SERPL-MCNC: 1 MG/DL — SIGNIFICANT CHANGE UP (ref 0.5–1.3)
GLUCOSE BLDC GLUCOMTR-MCNC: 122 MG/DL — HIGH (ref 70–99)
GLUCOSE SERPL-MCNC: 140 MG/DL — HIGH (ref 70–99)
HCT VFR BLD CALC: 36.1 % — LOW (ref 39–50)
HGB BLD-MCNC: 11.7 G/DL — LOW (ref 13–17)
MCHC RBC-ENTMCNC: 27.3 PG — SIGNIFICANT CHANGE UP (ref 27–34)
MCHC RBC-ENTMCNC: 32.4 GM/DL — SIGNIFICANT CHANGE UP (ref 32–36)
MCV RBC AUTO: 84.3 FL — SIGNIFICANT CHANGE UP (ref 80–100)
NRBC # BLD: 0 /100 WBCS — SIGNIFICANT CHANGE UP (ref 0–0)
PLATELET # BLD AUTO: 150 K/UL — SIGNIFICANT CHANGE UP (ref 150–400)
POTASSIUM SERPL-MCNC: 3.3 MMOL/L — LOW (ref 3.5–5.3)
POTASSIUM SERPL-SCNC: 3.3 MMOL/L — LOW (ref 3.5–5.3)
RBC # BLD: 4.28 M/UL — SIGNIFICANT CHANGE UP (ref 4.2–5.8)
RBC # FLD: 14.9 % — HIGH (ref 10.3–14.5)
SODIUM SERPL-SCNC: 140 MMOL/L — SIGNIFICANT CHANGE UP (ref 135–145)
WBC # BLD: 2.54 K/UL — LOW (ref 3.8–10.5)
WBC # FLD AUTO: 2.54 K/UL — LOW (ref 3.8–10.5)

## 2020-12-23 PROCEDURE — 99232 SBSQ HOSP IP/OBS MODERATE 35: CPT

## 2020-12-23 RX ORDER — POTASSIUM PHOSPHATE, MONOBASIC POTASSIUM PHOSPHATE, DIBASIC 236; 224 MG/ML; MG/ML
30 INJECTION, SOLUTION INTRAVENOUS ONCE
Refills: 0 | Status: DISCONTINUED | OUTPATIENT
Start: 2020-12-23 | End: 2020-12-23

## 2020-12-23 RX ORDER — SODIUM,POTASSIUM PHOSPHATES 278-250MG
1 POWDER IN PACKET (EA) ORAL ONCE
Refills: 0 | Status: COMPLETED | OUTPATIENT
Start: 2020-12-23 | End: 2020-12-23

## 2020-12-23 RX ORDER — PANTOPRAZOLE SODIUM 20 MG/1
40 TABLET, DELAYED RELEASE ORAL
Refills: 0 | Status: DISCONTINUED | OUTPATIENT
Start: 2020-12-23 | End: 2020-12-27

## 2020-12-23 RX ADMIN — Medication 100 MILLIGRAM(S): at 21:06

## 2020-12-23 RX ADMIN — Medication 1 MILLIGRAM(S): at 11:43

## 2020-12-23 RX ADMIN — ATORVASTATIN CALCIUM 20 MILLIGRAM(S): 80 TABLET, FILM COATED ORAL at 21:06

## 2020-12-23 RX ADMIN — Medication 200 MILLIGRAM(S): at 17:16

## 2020-12-23 RX ADMIN — PANTOPRAZOLE SODIUM 40 MILLIGRAM(S): 20 TABLET, DELAYED RELEASE ORAL at 05:34

## 2020-12-23 RX ADMIN — Medication 100 MILLIGRAM(S): at 11:43

## 2020-12-23 RX ADMIN — Medication 50 MILLIGRAM(S): at 11:43

## 2020-12-23 RX ADMIN — Medication 1 PACKET(S): at 17:16

## 2020-12-23 RX ADMIN — Medication 50 MILLIGRAM(S): at 05:34

## 2020-12-23 RX ADMIN — PANTOPRAZOLE SODIUM 40 MILLIGRAM(S): 20 TABLET, DELAYED RELEASE ORAL at 17:16

## 2020-12-23 RX ADMIN — Medication 1 TABLET(S): at 11:43

## 2020-12-23 RX ADMIN — Medication 2 MILLIGRAM(S): at 17:16

## 2020-12-24 LAB
ALBUMIN SERPL ELPH-MCNC: 3.8 G/DL — SIGNIFICANT CHANGE UP (ref 3.3–5)
ALP SERPL-CCNC: 47 U/L — SIGNIFICANT CHANGE UP (ref 40–120)
ALT FLD-CCNC: 92 U/L — HIGH (ref 12–78)
ANION GAP SERPL CALC-SCNC: 10 MMOL/L — SIGNIFICANT CHANGE UP (ref 5–17)
AST SERPL-CCNC: 90 U/L — HIGH (ref 15–37)
BILIRUB SERPL-MCNC: 0.6 MG/DL — SIGNIFICANT CHANGE UP (ref 0.2–1.2)
BUN SERPL-MCNC: 13 MG/DL — SIGNIFICANT CHANGE UP (ref 7–23)
CALCIUM SERPL-MCNC: 9.3 MG/DL — SIGNIFICANT CHANGE UP (ref 8.5–10.1)
CHLORIDE SERPL-SCNC: 105 MMOL/L — SIGNIFICANT CHANGE UP (ref 96–108)
CO2 SERPL-SCNC: 23 MMOL/L — SIGNIFICANT CHANGE UP (ref 22–31)
CREAT SERPL-MCNC: 1.13 MG/DL — SIGNIFICANT CHANGE UP (ref 0.5–1.3)
CRP SERPL-MCNC: 0.74 MG/DL — HIGH (ref 0–0.4)
FERRITIN SERPL-MCNC: 204 NG/ML — SIGNIFICANT CHANGE UP (ref 30–400)
GLUCOSE SERPL-MCNC: 85 MG/DL — SIGNIFICANT CHANGE UP (ref 70–99)
HCT VFR BLD CALC: 39.2 % — SIGNIFICANT CHANGE UP (ref 39–50)
HGB BLD-MCNC: 12.4 G/DL — LOW (ref 13–17)
LDH SERPL L TO P-CCNC: 245 U/L — HIGH (ref 50–242)
MAGNESIUM SERPL-MCNC: 1.6 MG/DL — SIGNIFICANT CHANGE UP (ref 1.6–2.6)
MCHC RBC-ENTMCNC: 27.1 PG — SIGNIFICANT CHANGE UP (ref 27–34)
MCHC RBC-ENTMCNC: 31.6 GM/DL — LOW (ref 32–36)
MCV RBC AUTO: 85.6 FL — SIGNIFICANT CHANGE UP (ref 80–100)
NRBC # BLD: 0 /100 WBCS — SIGNIFICANT CHANGE UP (ref 0–0)
PHOSPHATE SERPL-MCNC: 3.7 MG/DL — SIGNIFICANT CHANGE UP (ref 2.5–4.5)
PLATELET # BLD AUTO: 107 K/UL — LOW (ref 150–400)
POTASSIUM SERPL-MCNC: 3.6 MMOL/L — SIGNIFICANT CHANGE UP (ref 3.5–5.3)
POTASSIUM SERPL-SCNC: 3.6 MMOL/L — SIGNIFICANT CHANGE UP (ref 3.5–5.3)
PROT SERPL-MCNC: 8.2 GM/DL — SIGNIFICANT CHANGE UP (ref 6–8.3)
RBC # BLD: 4.58 M/UL — SIGNIFICANT CHANGE UP (ref 4.2–5.8)
RBC # FLD: 15 % — HIGH (ref 10.3–14.5)
SODIUM SERPL-SCNC: 138 MMOL/L — SIGNIFICANT CHANGE UP (ref 135–145)
WBC # BLD: 3.44 K/UL — LOW (ref 3.8–10.5)
WBC # FLD AUTO: 3.44 K/UL — LOW (ref 3.8–10.5)

## 2020-12-24 PROCEDURE — 99232 SBSQ HOSP IP/OBS MODERATE 35: CPT

## 2020-12-24 RX ADMIN — PANTOPRAZOLE SODIUM 40 MILLIGRAM(S): 20 TABLET, DELAYED RELEASE ORAL at 17:24

## 2020-12-24 RX ADMIN — Medication 100 MILLIGRAM(S): at 05:05

## 2020-12-24 RX ADMIN — Medication 1 TABLET(S): at 11:29

## 2020-12-24 RX ADMIN — Medication 100 MILLIGRAM(S): at 11:29

## 2020-12-24 RX ADMIN — Medication 1 MILLIGRAM(S): at 11:29

## 2020-12-24 RX ADMIN — ATORVASTATIN CALCIUM 20 MILLIGRAM(S): 80 TABLET, FILM COATED ORAL at 22:09

## 2020-12-24 RX ADMIN — PANTOPRAZOLE SODIUM 40 MILLIGRAM(S): 20 TABLET, DELAYED RELEASE ORAL at 05:05

## 2020-12-24 RX ADMIN — Medication 2 MILLIGRAM(S): at 00:01

## 2020-12-24 RX ADMIN — Medication 50 MILLIGRAM(S): at 05:05

## 2020-12-24 RX ADMIN — Medication 1 MILLIGRAM(S): at 12:10

## 2020-12-24 RX ADMIN — Medication 100 MILLIGRAM(S): at 22:09

## 2020-12-24 RX ADMIN — Medication 1 MILLIGRAM(S): at 22:09

## 2020-12-24 RX ADMIN — Medication 2 MILLIGRAM(S): at 16:23

## 2020-12-24 RX ADMIN — Medication 100 MILLIGRAM(S): at 14:47

## 2020-12-24 RX ADMIN — Medication 50 MILLIGRAM(S): at 18:22

## 2020-12-25 LAB
ALBUMIN SERPL ELPH-MCNC: 3.8 G/DL — SIGNIFICANT CHANGE UP (ref 3.3–5)
ALP SERPL-CCNC: 49 U/L — SIGNIFICANT CHANGE UP (ref 40–120)
ALT FLD-CCNC: 100 U/L — HIGH (ref 12–78)
ANION GAP SERPL CALC-SCNC: 9 MMOL/L — SIGNIFICANT CHANGE UP (ref 5–17)
AST SERPL-CCNC: 95 U/L — HIGH (ref 15–37)
BILIRUB SERPL-MCNC: 0.4 MG/DL — SIGNIFICANT CHANGE UP (ref 0.2–1.2)
BUN SERPL-MCNC: 17 MG/DL — SIGNIFICANT CHANGE UP (ref 7–23)
CALCIUM SERPL-MCNC: 9.4 MG/DL — SIGNIFICANT CHANGE UP (ref 8.5–10.1)
CHLORIDE SERPL-SCNC: 105 MMOL/L — SIGNIFICANT CHANGE UP (ref 96–108)
CO2 SERPL-SCNC: 26 MMOL/L — SIGNIFICANT CHANGE UP (ref 22–31)
CREAT SERPL-MCNC: 1.09 MG/DL — SIGNIFICANT CHANGE UP (ref 0.5–1.3)
CRP SERPL-MCNC: 0.46 MG/DL — HIGH (ref 0–0.4)
FERRITIN SERPL-MCNC: 140 NG/ML — SIGNIFICANT CHANGE UP (ref 30–400)
GLUCOSE BLDC GLUCOMTR-MCNC: 103 MG/DL — HIGH (ref 70–99)
GLUCOSE BLDC GLUCOMTR-MCNC: 95 MG/DL — SIGNIFICANT CHANGE UP (ref 70–99)
GLUCOSE SERPL-MCNC: 87 MG/DL — SIGNIFICANT CHANGE UP (ref 70–99)
MAGNESIUM SERPL-MCNC: 1.6 MG/DL — SIGNIFICANT CHANGE UP (ref 1.6–2.6)
PHOSPHATE SERPL-MCNC: 3.4 MG/DL — SIGNIFICANT CHANGE UP (ref 2.5–4.5)
POTASSIUM SERPL-MCNC: 3.4 MMOL/L — LOW (ref 3.5–5.3)
POTASSIUM SERPL-SCNC: 3.4 MMOL/L — LOW (ref 3.5–5.3)
PROT SERPL-MCNC: 8.2 GM/DL — SIGNIFICANT CHANGE UP (ref 6–8.3)
SODIUM SERPL-SCNC: 140 MMOL/L — SIGNIFICANT CHANGE UP (ref 135–145)

## 2020-12-25 PROCEDURE — 99232 SBSQ HOSP IP/OBS MODERATE 35: CPT

## 2020-12-25 RX ADMIN — Medication 100 MILLIGRAM(S): at 13:15

## 2020-12-25 RX ADMIN — ATORVASTATIN CALCIUM 20 MILLIGRAM(S): 80 TABLET, FILM COATED ORAL at 22:06

## 2020-12-25 RX ADMIN — Medication 100 MILLIGRAM(S): at 12:07

## 2020-12-25 RX ADMIN — Medication 200 MILLIGRAM(S): at 03:45

## 2020-12-25 RX ADMIN — Medication 100 MILLIGRAM(S): at 22:06

## 2020-12-25 RX ADMIN — Medication 100 MILLIGRAM(S): at 05:04

## 2020-12-25 RX ADMIN — Medication 1 TABLET(S): at 12:07

## 2020-12-25 RX ADMIN — PANTOPRAZOLE SODIUM 40 MILLIGRAM(S): 20 TABLET, DELAYED RELEASE ORAL at 05:05

## 2020-12-25 RX ADMIN — PANTOPRAZOLE SODIUM 40 MILLIGRAM(S): 20 TABLET, DELAYED RELEASE ORAL at 17:05

## 2020-12-25 RX ADMIN — Medication 200 MILLIGRAM(S): at 22:06

## 2020-12-25 RX ADMIN — Medication 2 MILLIGRAM(S): at 22:06

## 2020-12-25 RX ADMIN — Medication 2 MILLIGRAM(S): at 13:16

## 2020-12-25 RX ADMIN — Medication 1 MILLIGRAM(S): at 12:07

## 2020-12-25 NOTE — CHART NOTE - NSCHARTNOTEFT_GEN_A_CORE
Medicine Hospitalist PA    Requested by RN to place an IV heplock in patient. As per RN pt is a difficult stick. Placed IV heplock 20 gauge in left forearm.  IV flushed and secured.

## 2020-12-26 LAB — SARS-COV-2 RNA SPEC QL NAA+PROBE: DETECTED

## 2020-12-26 PROCEDURE — 99233 SBSQ HOSP IP/OBS HIGH 50: CPT

## 2020-12-26 RX ADMIN — Medication 1 TABLET(S): at 11:52

## 2020-12-26 RX ADMIN — Medication 100 MILLIGRAM(S): at 21:16

## 2020-12-26 RX ADMIN — Medication 100 MILLIGRAM(S): at 15:04

## 2020-12-26 RX ADMIN — ATORVASTATIN CALCIUM 20 MILLIGRAM(S): 80 TABLET, FILM COATED ORAL at 21:16

## 2020-12-26 RX ADMIN — PANTOPRAZOLE SODIUM 40 MILLIGRAM(S): 20 TABLET, DELAYED RELEASE ORAL at 17:22

## 2020-12-26 RX ADMIN — Medication 100 MILLIGRAM(S): at 05:28

## 2020-12-26 RX ADMIN — Medication 100 MILLIGRAM(S): at 11:52

## 2020-12-26 RX ADMIN — Medication 1 MILLIGRAM(S): at 11:52

## 2020-12-26 RX ADMIN — PANTOPRAZOLE SODIUM 40 MILLIGRAM(S): 20 TABLET, DELAYED RELEASE ORAL at 05:28

## 2020-12-26 NOTE — PROGRESS NOTE ADULT - PROBLEM SELECTOR PLAN 3
- CXR neg  - supportive care, no indication for steroids yet
- CXR neg  - supportive care, no indication for steroids yet
- CXR neg  - supportive care, no indication for steroids yet 3
- CXR neg  - supportive care, no indication for steroids yet 3
- CXR neg  - supportive care, no indication for steroids yet

## 2020-12-26 NOTE — PROGRESS NOTE ADULT - REASON FOR ADMISSION
Alcohol withdrawal, coffee ground emesis, COVID +ve

## 2020-12-26 NOTE — PROGRESS NOTE ADULT - ASSESSMENT
54 y/o M with PMHx of EtOH abuse, Gastritis and DT's presents to the ED nausea and vomiting x 1 week. Pt being admitted w/ gastritis associated w/ coffee ground emesis, COVID +ve.    
HPI:       56 y/o M with PMHx of EtOH abuse, Gastritis and DT's recurrent admissions for abdominal pain, etoh w/drawal; last admitted earlier this month presents to the ED nausea and vomiting x 1 week. Patient reports emesis was initially yellow then noted some dark blood in the last 2 days. Pt also c/o difuse diffuse abdominal pain, joint pains and myalgias. Patient notes he has a family member who has COVID symptoms. Pt denies fever, chills, cough, chest pain, dizziness, diarrhea or dysuria. Patient also states he fell 2 weeks ago while in the bathroom, complaining of HA. Pt reports last drink 3 days ago as he has not been able to keep anything down. Unknown when last scoped.     In ED initial vitals show , BP mildly elevated, rest of vitals stable. For sig labs see below. Pan CT neg for acute pathology. COVID+ ve CXR neg. Pt given PPI, K, IVF and ativan.  (21 Dec 2020 20:15)  ------- As Above -------------------------  Patient is a poor historian. Patient known to my service. Presented with coffee ground emesis and abdominal pain. N/V started 3 days ago. Melena (?). ETOH abuse (+). Denies NSAIDs.   See labs / CT Scans    Coffee ground emesis - Work up in the past revealed gastritis. No signs of active bleeding. Patient on solid diet. Asymptomatic 1) PO PPI 2) Patient is not a candidate for EGD ( Covid ) 3) Stable from GI point of view for discharge
 54 y/o M with PMHx of EtOH abuse, Gastritis and DT's presents to the ED nausea and vomiting x 1 week. Pt being admitted w/ gastritis associated w/ coffee ground emesis, COVID +ve.     Pt is medically stable to be discharged and CBC remains stable. He is a known alcoholic well known to this hospital and covid ++. Although he is asymptomatic he could be infectious still and will almost certainly not comply with quarantining at  home and go back to drinking. WIll repeat covid remains positive 
HPI:       56 y/o M with PMHx of EtOH abuse, Gastritis and DT's recurrent admissions for abdominal pain, etoh w/drawal; last admitted earlier this month presents to the ED nausea and vomiting x 1 week. Patient reports emesis was initially yellow then noted some dark blood in the last 2 days. Pt also c/o difuse diffuse abdominal pain, joint pains and myalgias. Patient notes he has a family member who has COVID symptoms. Pt denies fever, chills, cough, chest pain, dizziness, diarrhea or dysuria. Patient also states he fell 2 weeks ago while in the bathroom, complaining of HA. Pt reports last drink 3 days ago as he has not been able to keep anything down. Unknown when last scoped.     In ED initial vitals show , BP mildly elevated, rest of vitals stable. For sig labs see below. Pan CT neg for acute pathology. COVID+ ve CXR neg. Pt given PPI, K, IVF and ativan.  (21 Dec 2020 20:15)  ------- As Above -------------------------  Patient is a poor historian. Patient known to my service. Presented with coffee ground emesis and abdominal pain. N/V started 3 days ago. Melena (?). ETOH abuse (+). Denies NSAIDs.   See labs / CT Scans    Coffee ground emesis - Work up in the past revealed gastritis. No signs of active bleeding. Patient on solid diet. Asymptomatic 1) PO PPI 2) Patient is not a candidate for EGD ( Covid ) 3) Stable from GI point of view for discharge  ==== Will follow on a PRN basis
 56 y/o M with PMHx of EtOH abuse, Gastritis and DT's presents to the ED nausea and vomiting x 1 week. Pt being admitted w/ gastritis associated w/ coffee ground emesis, COVID +ve.    
 54 y/o M with PMHx of EtOH abuse, Gastritis and DT's presents to the ED nausea and vomiting x 1 week. Pt being admitted w/ gastritis associated w/ coffee ground emesis, COVID +ve.    
 54 y/o M with PMHx of EtOH abuse, Gastritis and DT's presents to the ED nausea and vomiting x 1 week. Pt being admitted w/ gastritis associated w/ coffee ground emesis, COVID +ve.     Pt is medically stable to be discharged and CBC remains stable. He is a known alcoholic well known to this hospital and covid ++. Although he is asymptomatic he could be infectious still and will almost certainly not comply with quarantining at  home and go back to drinking. WIll repeat covid test today.

## 2020-12-26 NOTE — PROGRESS NOTE ADULT - PROBLEM SELECTOR PLAN 2
- CIWA  - thiamine and folate
- CIWA  - thiamine and folate
- start CIWA  - IVF  - thiamine and folate    replete hypomagnesemia
- start CIWA  - IVF  - thiamine and folate    replete hypomagnesemia
- CIWA  - thiamine and folate

## 2020-12-26 NOTE — PROGRESS NOTE ADULT - PROBLEM SELECTOR PLAN 1
- pt coffee ground emesis likely due to alcohol induced gastritis  - PPI po BID  diet GI is signing off   - GI eval in am  - IVF  - monitor H/H at baseline now   - LA likely due to vomiting, dehydration, c/w IVF, trending down.
- pt coffee ground emesis likely due to alcohol induced gastritis  - PPI po BID  diet GI is signing off   - GI eval in am
- pt coffee ground emesis likely due to alcohol induced gastritis  - PPI IV BID  - NPO  - GI eval in am  - IVF  - monitor H/H at baseline now   - LA likely due to vomiting, dehydration, c/w IVF, trending down.
- pt coffee ground emesis likely due to alcohol induced gastritis  - PPI IV BID  diet per GI   - GI eval in am  - IVF  - monitor H/H at baseline now   - LA likely due to vomiting, dehydration, c/w IVF, trending down.
- pt coffee ground emesis likely due to alcohol induced gastritis  - PPI po BID  diet GI is signing off   - GI eval in am

## 2020-12-26 NOTE — PROGRESS NOTE ADULT - PROBLEM SELECTOR PROBLEM 1
Non-intractable vomiting with nausea

## 2020-12-26 NOTE — PROGRESS NOTE ADULT - SUBJECTIVE AND OBJECTIVE BOX
Patient is a 55y old  Male who presents with a chief complaint of Alcohol withdrawal, coffee ground emesis, COVID +ve (23 Dec 2020 14:12)      HPI:       56 y/o M with PMHx of EtOH abuse, Gastritis and DT's recurrent admissions for abdominal pain, etoh w/drawal; last admitted earlier this month presents to the ED nausea and vomiting x 1 week. Patient reports emesis was initially yellow then noted some dark blood in the last 2 days. Pt also c/o difuse diffuse abdominal pain, joint pains and myalgias. Patient notes he has a family member who has COVID symptoms. Pt denies fever, chills, cough, chest pain, dizziness, diarrhea or dysuria. Patient also states he fell 2 weeks ago while in the bathroom, complaining of HA. Pt reports last drink 3 days ago as he has not been able to keep anything down. Unknown when last scoped.     In ED initial vitals show , BP mildly elevated, rest of vitals stable. For sig labs see below. Pan CT neg for acute pathology. COVID+ ve CXR neg. Pt given PPI, K, IVF and ativan.  (21 Dec 2020 20:15)      INTERVAL HPI/OVERNIGHT EVENTS:  generalized whole body pain. The patient denies melena, hematochezia, hematemesis, nausea, vomiting, constipation, diarrhea, or change in bowel movements     MEDICATIONS  (STANDING):  atorvastatin 20 milliGRAM(s) Oral at bedtime  benzonatate 100 milliGRAM(s) Oral three times a day  chlordiazePOXIDE 50 milliGRAM(s) Oral every 12 hours  chlordiazePOXIDE 50 milliGRAM(s) Oral once  chlordiazePOXIDE   Oral   folic acid 1 milliGRAM(s) Oral daily  multivitamin 1 Tablet(s) Oral daily  pantoprazole    Tablet 40 milliGRAM(s) Oral two times a day  thiamine 100 milliGRAM(s) Oral daily    MEDICATIONS  (PRN):  guaiFENesin   Syrup  (Sugar-Free) 200 milliGRAM(s) Oral every 6 hours PRN Cough  LORazepam   Injectable 1 milliGRAM(s) IV Push every 1 hour PRN CIWA 7 or less  LORazepam   Injectable 2 milliGRAM(s) IV Push every 1 hour PRN CIWA 8 or greater  ondansetron Injectable 4 milliGRAM(s) IV Push every 6 hours PRN Nausea and/or Vomiting      FAMILY HISTORY:  No pertinent family history in first degree relatives        Allergies    No Known Allergies    Intolerances        PMH/PSH:  DTs (delirium tremens)    Substance abuse    Gastritis    EtOH dependence    Mixed hyperlipidemia    PUD (peptic ulcer disease)    Alcohol abuse    No significant past surgical history          REVIEW OF SYSTEMS:  CONSTITUTIONAL: No fever, weight loss, or fatigue  EYES: No eye pain, visual disturbances, or discharge  ENMT:  No difficulty hearing, tinnitus, vertigo; No sinus or throat pain  NECK: No pain or stiffness  BREASTS: No pain, masses, or nipple discharge  RESPIRATORY: No cough, wheezing, chills or hemoptysis; No shortness of breath  CARDIOVASCULAR: No chest pain, palpitations, dizziness, or leg swelling  GASTROINTESTINAL: See above  GENITOURINARY: No dysuria, frequency, hematuria, or incontinence  NEUROLOGICAL: No headaches, memory loss, loss of strength, numbness, or tremors  SKIN: No itching, burning, rashes, or lesions   LYMPH NODES: No enlarged glands  ENDOCRINE: No heat or cold intolerance; No hair loss  MUSCULOSKELETAL: No joint pain or swelling; No muscle, back, or extremity pain  PSYCHIATRIC: No depression, anxiety, mood swings, or difficulty sleeping  HEME/LYMPH: No easy bruising, or bleeding gums  ALLERGY AND IMMUNOLOGIC: No hives or eczema    Vital Signs Last 24 Hrs  T(C): 36.2 (24 Dec 2020 10:48), Max: 36.5 (24 Dec 2020 00:01)  T(F): 97.2 (24 Dec 2020 10:48), Max: 97.7 (24 Dec 2020 00:01)  HR: 79 (24 Dec 2020 10:48) (60 - 79)  BP: 96/60 (24 Dec 2020 10:48) (96/60 - 147/64)  BP(mean): --  RR: 18 (24 Dec 2020 10:48) (17 - 18)  SpO2: 96% (24 Dec 2020 10:48) (96% - 97%)    PHYSICAL EXAM:  GENERAL: NAD, well-groomed, well-developed  HEAD:  Atraumatic, Normocephalic  EYES: EOMI, PERRLA, conjunctiva and sclera clear  NECK: Supple, No JVD, Normal thyroid  NERVOUS SYSTEM:  Alert & Oriented X3, Good concentration; Motor Strength 5/5 B/L upper and lower extremities;  CHEST/LUNG: Clear to percussion bilaterally; No rales, rhonchi, wheezing, or rubs  HEART: Regular rate and rhythm; No murmurs, rubs, or gallops  ABDOMEN: Soft, Nontender, Nondistended; Bowel sounds present  EXTREMITIES:  2+ Peripheral Pulses, No clubbing, cyanosis, or edema  LYMPH: No lymphadenopathy noted  SKIN: No rashes or lesions    LAB  12-21 @ 16:56  amylase --   lipase 244                           12.4   3.44  )-----------( 107      ( 24 Dec 2020 08:25 )             39.2       CBC:  12-24 @ 08:25  WBC 3.44   Hgb 12.4   Hct 39.2   Plts 107  MCV 85.6  12-23 @ 10:08  WBC 2.54   Hgb 11.7   Hct 36.1   Plts 150  MCV 84.3  12-22 @ 08:47  WBC 2.36   Hgb 10.2   Hct 32.4   Plts 146  MCV 85.3  12-21 @ 16:56  WBC 2.43   Hgb 13.9   Hct 42.2   Plts 238  MCV 82.3      Chemistry:  12-24 @ 08:25  Na+ 138  K+ 3.6  Cl- 105  CO2 23  BUN 13  Cr 1.13     12-23 @ 10:08  Na+ 140  K+ 3.3  Cl- 105  CO2 26  BUN 10  Cr 1.00     12-22 @ 08:47  Na+ 143  K+ 3.7  Cl- 109  CO2 25  BUN 11  Cr 0.78     12-21 @ 16:56  Na+ 139  K+ 3.4  Cl- 94  CO2 26  BUN 14  Cr 1.21         Glucose, Serum: 85 mg/dL (12-24 @ 08:25)  Glucose, Serum: 140 mg/dL (12-23 @ 10:08)  Glucose, Serum: 66 mg/dL (12-22 @ 08:47)  Glucose, Serum: 64 mg/dL (12-22 @ 08:47)  Glucose, Serum: 80 mg/dL (12-21 @ 16:56)      24 Dec 2020 08:25    138    |  105    |  13     ----------------------------<  85     3.6     |  23     |  1.13   23 Dec 2020 10:08    140    |  105    |  10     ----------------------------<  140    3.3     |  26     |  1.00   22 Dec 2020 08:47    143    |  109    |  11     ----------------------------<  66     3.7     |  25     |  0.78   21 Dec 2020 16:56    139    |  94     |  14     ----------------------------<  80     3.4     |  26     |  1.21     Ca    9.3        24 Dec 2020 08:25  Ca    8.9        23 Dec 2020 10:08  Ca    7.5        22 Dec 2020 08:47  Ca    9.8        21 Dec 2020 16:56  Phos  3.7       24 Dec 2020 08:25  Phos  1.6       22 Dec 2020 08:47  Phos  2.2       21 Dec 2020 16:56  Mg     1.6       24 Dec 2020 08:25  Mg     1.5       22 Dec 2020 08:47  Mg     1.9       21 Dec 2020 16:56    TPro  8.2    /  Alb  3.8    /  TBili  0.6    /  DBili  x      /  AST  90     /  ALT  92     /  AlkPhos  47     24 Dec 2020 08:25  TPro  6.5    /  Alb  3.2    /  TBili  0.8    /  DBili  x      /  AST  68     /  ALT  65     /  AlkPhos  33     22 Dec 2020 08:47  TPro  9.8    /  Alb  4.7    /  TBili  0.5    /  DBili  .15    /  AST  118    /  ALT  100    /  AlkPhos  56     21 Dec 2020 16:56              CAPILLARY BLOOD GLUCOSE          C-Reactive Protein, Serum: 0.74 mg/dL (12-24 @ 10:02)      RADIOLOGY & ADDITIONAL TESTS:    Imaging Personally Reviewed:  [ ] YES  [ ] NO    Consultant(s) Notes Reviewed:  [ ] YES  [ ] NO    Care Discussed with Consultants/Other Providers [ ] YES  [ ] NO
Patient is a 55y old  Male who presents with a chief complaint of Alcohol withdrawal, coffee ground emesis, COVID +ve (22 Dec 2020 16:28)      HPI:       54 y/o M with PMHx of EtOH abuse, Gastritis and DT's recurrent admissions for abdominal pain, etoh w/drawal; last admitted earlier this month presents to the ED nausea and vomiting x 1 week. Patient reports emesis was initially yellow then noted some dark blood in the last 2 days. Pt also c/o difuse diffuse abdominal pain, joint pains and myalgias. Patient notes he has a family member who has COVID symptoms. Pt denies fever, chills, cough, chest pain, dizziness, diarrhea or dysuria. Patient also states he fell 2 weeks ago while in the bathroom, complaining of HA. Pt reports last drink 3 days ago as he has not been able to keep anything down. Unknown when last scoped.     In ED initial vitals show , BP mildly elevated, rest of vitals stable. For sig labs see below. Pan CT neg for acute pathology. COVID+ ve CXR neg. Pt given PPI, K, IVF and ativan.  (21 Dec 2020 20:15)      INTERVAL HPI/OVERNIGHT EVENTS:  The patient denies melena, hematochezia, hematemesis, nausea, vomiting, abdominal pain, constipation, diarrhea, or change in bowel movements Tolerating regular diet    MEDICATIONS  (STANDING):  atorvastatin 20 milliGRAM(s) Oral at bedtime  chlordiazePOXIDE   Oral   folic acid 1 milliGRAM(s) Oral daily  multivitamin 1 Tablet(s) Oral daily  pantoprazole  Injectable 40 milliGRAM(s) IV Push two times a day  potassium phosphate / sodium phosphate Powder (PHOS-NaK) 1 Packet(s) Oral once  thiamine 100 milliGRAM(s) Oral daily    MEDICATIONS  (PRN):  LORazepam   Injectable 1 milliGRAM(s) IV Push every 1 hour PRN CIWA 7 or less  LORazepam   Injectable 2 milliGRAM(s) IV Push every 1 hour PRN CIWA 8 or greater  ondansetron Injectable 4 milliGRAM(s) IV Push every 6 hours PRN Nausea and/or Vomiting      FAMILY HISTORY:  No pertinent family history in first degree relatives        Allergies    No Known Allergies    Intolerances        PMH/PSH:  DTs (delirium tremens)    Substance abuse    Gastritis    EtOH dependence    Mixed hyperlipidemia    PUD (peptic ulcer disease)    Alcohol abuse    No significant past surgical history          REVIEW OF SYSTEMS:  CONSTITUTIONAL: No fever, weight loss,   EYES: No eye pain, visual disturbances, or discharge  ENMT:  No difficulty hearing, tinnitus, vertigo; No sinus or throat pain  NECK: No pain or stiffness  BREASTS: No pain, masses, or nipple discharge  RESPIRATORY: No cough, wheezing, chills or hemoptysis; No shortness of breath  CARDIOVASCULAR: No chest pain, palpitations, dizziness, or leg swelling  GASTROINTESTINAL:  See above  GENITOURINARY: No dysuria, frequency, hematuria, or incontinence  NEUROLOGICAL: No headaches, memory loss, loss of strength, numbness, or tremors  SKIN: No itching, burning, rashes, or lesions   LYMPH NODES: No enlarged glands  ENDOCRINE: No heat or cold intolerance; No hair loss  MUSCULOSKELETAL: No joint pain or swelling; No muscle, back, or extremity pain  PSYCHIATRIC: No depression, anxiety, mood swings, or difficulty sleeping  HEME/LYMPH: No easy bruising, or bleeding gums  ALLERGY AND IMMUNOLOGIC: No hives or eczema    Vital Signs Last 24 Hrs  T(C): 36.9 (23 Dec 2020 10:54), Max: 37.1 (22 Dec 2020 17:13)  T(F): 98.5 (23 Dec 2020 10:54), Max: 98.7 (22 Dec 2020 17:13)  HR: 88 (23 Dec 2020 10:54) (67 - 88)  BP: 100/65 (23 Dec 2020 10:54) (100/65 - 124/79)  BP(mean): --  RR: 18 (23 Dec 2020 10:54) (18 - 18)  SpO2: 98% (23 Dec 2020 10:54) (97% - 98%)    PHYSICAL EXAM:  GENERAL: NAD, well-groomed, well-developed  HEAD:  Atraumatic, Normocephalic  EYES: EOMI, PERRLA, conjunctiva and sclera clear  NECK: Supple, No JVD, Normal thyroid  NERVOUS SYSTEM:  Alert & Oriented X3, Good concentration; Motor Strength 5/5 B/L upper and lower extremities;  CHEST/LUNG: Clear to percussion bilaterally; No rales, rhonchi, wheezing, or rubs  HEART: Regular rate and rhythm; No murmurs, rubs, or gallops  ABDOMEN: Soft, Nontender, Nondistended; Bowel sounds present  EXTREMITIES:  2+ Peripheral Pulses, No clubbing, cyanosis, or edema  LYMPH: No lymphadenopathy noted  SKIN: No rashes or lesions    LAB  12-21 @ 16:56  amylase --   lipase 244                           11.7   2.54  )-----------( 150      ( 23 Dec 2020 10:08 )             36.1       CBC:  12-23 @ 10:08  WBC 2.54   Hgb 11.7   Hct 36.1   Plts 150  MCV 84.3  12-22 @ 08:47  WBC 2.36   Hgb 10.2   Hct 32.4   Plts 146  MCV 85.3  12-21 @ 16:56  WBC 2.43   Hgb 13.9   Hct 42.2   Plts 238  MCV 82.3      Chemistry:  12-23 @ 10:08  Na+ 140  K+ 3.3  Cl- 105  CO2 26  BUN 10  Cr 1.00     12-22 @ 08:47  Na+ 143  K+ 3.7  Cl- 109  CO2 25  BUN 11  Cr 0.78     12-21 @ 16:56  Na+ 139  K+ 3.4  Cl- 94  CO2 26  BUN 14  Cr 1.21         Glucose, Serum: 140 mg/dL (12-23 @ 10:08)  Glucose, Serum: 66 mg/dL (12-22 @ 08:47)  Glucose, Serum: 64 mg/dL (12-22 @ 08:47)  Glucose, Serum: 80 mg/dL (12-21 @ 16:56)      23 Dec 2020 10:08    140    |  105    |  10     ----------------------------<  140    3.3     |  26     |  1.00   22 Dec 2020 08:47    143    |  109    |  11     ----------------------------<  66     3.7     |  25     |  0.78   21 Dec 2020 16:56    139    |  94     |  14     ----------------------------<  80     3.4     |  26     |  1.21     Ca    8.9        23 Dec 2020 10:08  Ca    7.5        22 Dec 2020 08:47  Ca    9.8        21 Dec 2020 16:56  Phos  1.6       22 Dec 2020 08:47  Phos  2.2       21 Dec 2020 16:56  Mg     1.5       22 Dec 2020 08:47  Mg     1.9       21 Dec 2020 16:56    TPro  6.5    /  Alb  3.2    /  TBili  0.8    /  DBili  x      /  AST  68     /  ALT  65     /  AlkPhos  33     22 Dec 2020 08:47  TPro  9.8    /  Alb  4.7    /  TBili  0.5    /  DBili  .15    /  AST  118    /  ALT  100    /  AlkPhos  56     21 Dec 2020 16:56      PT/INR - ( 21 Dec 2020 16:56 )   PT: 11.3 sec;   INR: 0.97 ratio         PTT - ( 21 Dec 2020 16:56 )  PTT:26.3 sec        CAPILLARY BLOOD GLUCOSE      POCT Blood Glucose.: 122 mg/dL (23 Dec 2020 11:38)          RADIOLOGY & ADDITIONAL TESTS:    Imaging Personally Reviewed:  [ ] YES  [ ] NO    Consultant(s) Notes Reviewed:  [ ] YES  [ ] NO    Care Discussed with Consultants/Other Providers [ ] YES  [ ] NO
Patient is a 55y old  Male who presents with a chief complaint of Alcohol withdrawal, coffee ground emesis, COVID +ve (21 Dec 2020 20:15)      INTERVAL HPI/OVERNIGHT EVENTS: no events     MEDICATIONS  (STANDING):  atorvastatin 20 milliGRAM(s) Oral at bedtime  chlordiazePOXIDE 50 milliGRAM(s) Oral every 6 hours  chlordiazePOXIDE 50 milliGRAM(s) Oral every 8 hours  chlordiazePOXIDE   Oral   folic acid 1 milliGRAM(s) Oral daily  magnesium sulfate  IVPB 1 Gram(s) IV Intermittent once  multivitamin 1 Tablet(s) Oral daily  pantoprazole  Injectable 40 milliGRAM(s) IV Push two times a day  sodium chloride 0.9%. 1000 milliLiter(s) (125 mL/Hr) IV Continuous <Continuous>  thiamine 100 milliGRAM(s) Oral daily    MEDICATIONS  (PRN):  LORazepam   Injectable 1 milliGRAM(s) IV Push every 1 hour PRN CIWA 7 or less  LORazepam   Injectable 2 milliGRAM(s) IV Push every 1 hour PRN CIWA 8 or greater  ondansetron Injectable 4 milliGRAM(s) IV Push every 6 hours PRN Nausea and/or Vomiting      Allergies    No Known Allergies    Intolerances           Vital Signs Last 24 Hrs  T(C): 37.1 (22 Dec 2020 06:42), Max: 37.1 (21 Dec 2020 20:40)  T(F): 98.7 (22 Dec 2020 06:42), Max: 98.7 (21 Dec 2020 20:40)  HR: 69 (22 Dec 2020 06:42) (68 - 121)  BP: 120/76 (22 Dec 2020 06:42) (113/70 - 151/94)  BP(mean): --  RR: 18 (22 Dec 2020 06:42) (15 - 22)  SpO2: 98% (22 Dec 2020 06:42) (94% - 98%)    PHYSICAL EXAM:  GENERAL: NAD, well-groomed, well-developed  HEAD:  Atraumatic, Normocephalic  EYES: EOMI, PERRLA, conjunctiva and sclera clear  ENMT: No tonsillar erythema, exudates, or enlargement; Moist mucous membranes, Good dentition, No lesions  NECK: Supple, No JVD, Normal thyroid  NERVOUS SYSTEM:  Alert & Oriented X3, Good concentration; Motor Strength 5/5 B/L upper and lower extremities; DTRs 2+ intact and symmetric  CHEST/LUNG: Clear to percussion bilaterally; No rales, rhonchi, wheezing, or rubs  HEART: Regular rate and rhythm; No murmurs, rubs, or gallops  ABDOMEN: Soft, Nontender, Nondistended; Bowel sounds present  EXTREMITIES:  2+ Peripheral Pulses, No clubbing, cyanosis, or edema  LYMPH: No lymphadenopathy noted  SKIN: No rashes or lesions    LABS:                        10.2   2.36  )-----------( 146      ( 22 Dec 2020 08:47 )             32.4     12-22    143  |  109<H>  |  11  ----------------------------<  66<L>  3.7   |  25  |  0.78    Ca    7.5<L>      22 Dec 2020 08:47  Phos  1.6     12-22  Mg     1.5     12-22    TPro  6.5  /  Alb  3.2<L>  /  TBili  0.8  /  DBili  x   /  AST  68<H>  /  ALT  65  /  AlkPhos  33<L>  12-22    PT/INR - ( 21 Dec 2020 16:56 )   PT: 11.3 sec;   INR: 0.97 ratio         PTT - ( 21 Dec 2020 16:56 )  PTT:26.3 sec    CAPILLARY BLOOD GLUCOSE      POCT Blood Glucose.: 116 mg/dL (22 Dec 2020 09:53)  POCT Blood Glucose.: 86 mg/dL (22 Dec 2020 07:48)  POCT Blood Glucose.: 73 mg/dL (21 Dec 2020 17:34)      RADIOLOGY & ADDITIONAL TESTS:    Imaging Personally Reviewed:  [ X] YES  [ ] NO    Consultant(s) Notes Reviewed:  [ X] YES  [ ] NO    Care Discussed with Consultants/Other Providers [X ] YES  [ ] NO
HPI:       54 y/o M with PMHx of EtOH abuse, Gastritis and DT's recurrent admissions for abdominal pain, etoh w/drawal; last admitted earlier this month presents to the ED nausea and vomiting x 1 week. Patient reports emesis was initially yellow then noted some dark blood in the last 2 days. Pt also c/o difuse diffuse abdominal pain, joint pains and myalgias. Patient notes he has a family member who has COVID symptoms. Pt denies fever, chills, cough, chest pain, dizziness, diarrhea or dysuria. Patient also states he fell 2 weeks ago while in the bathroom, complaining of HA. Pt reports last drink 3 days ago as he has not been able to keep anything down. Unknown when last scoped.     In ED initial vitals show , BP mildly elevated, rest of vitals stable. For sig labs see below. Pan CT neg for acute pathology. COVID+ ve CXR neg. Pt given PPI, K, IVF and ativan.  (21 Dec 2020 20:15)    Patient is a 55y old  Male who presents with a chief complaint of Alcohol withdrawal, coffee ground emesis, COVID +ve (23 Dec 2020 12:01)      INTERVAL HPI/OVERNIGHT EVENTS: ciwa 3-9 no acute events overnight      MEDICATIONS  (STANDING):  atorvastatin 20 milliGRAM(s) Oral at bedtime  benzonatate 100 milliGRAM(s) Oral three times a day  chlordiazePOXIDE 50 milliGRAM(s) Oral every 12 hours  chlordiazePOXIDE 50 milliGRAM(s) Oral once  chlordiazePOXIDE   Oral   folic acid 1 milliGRAM(s) Oral daily  multivitamin 1 Tablet(s) Oral daily  pantoprazole    Tablet 40 milliGRAM(s) Oral two times a day  thiamine 100 milliGRAM(s) Oral daily    MEDICATIONS  (PRN):  guaiFENesin   Syrup  (Sugar-Free) 200 milliGRAM(s) Oral every 6 hours PRN Cough  LORazepam   Injectable 2 milliGRAM(s) IV Push every 1 hour PRN CIWA 8 or greater  LORazepam   Injectable 1 milliGRAM(s) IV Push every 1 hour PRN CIWA 7 or less  ondansetron Injectable 4 milliGRAM(s) IV Push every 6 hours PRN Nausea and/or Vomiting      Allergies    No Known Allergies    Intolerances        REVIEW OF SYSTEMS:  CONSTITUTIONAL: No fever, weight loss, or fatigue  EYES: No eye pain, visual disturbances, or discharge  ENMT:  No difficulty hearing, tinnitus, vertigo; No sinus or throat pain  NECK: No pain or stiffness  RESPIRATORY: No cough, wheezing, chills or hemoptysis; No shortness of breath  CARDIOVASCULAR: No chest pain, palpitations, dizziness, or leg swelling  GASTROINTESTINAL: mild abdominal pain   GENITOURINARY: No dysuria, frequency, hematuria, or incontinence  NEUROLOGICAL: No headaches, memory loss, loss of strength, numbness, or tremors  SKIN: No itching, burning, rashes, or lesions   LYMPH NODES: No enlarged glands  ENDOCRINE: No heat or cold intolerance; No hair loss  MUSCULOSKELETAL: No joint pain or swelling; No muscle, back, or extremity pain  PSYCHIATRIC: No depression, anxiety, mood swings, or difficulty sleeping  HEME/LYMPH: No easy bruising, or bleeding gums  ALLERGY AND IMMUNOLOGIC: No hives or eczema    Vital Signs Last 24 Hrs  T(C): 36.9 (23 Dec 2020 10:54), Max: 37.1 (22 Dec 2020 17:13)  T(F): 98.5 (23 Dec 2020 10:54), Max: 98.7 (22 Dec 2020 17:13)  HR: 88 (23 Dec 2020 10:54) (67 - 88)  BP: 100/65 (23 Dec 2020 10:54) (100/65 - 124/79)  RR: 18 (23 Dec 2020 10:54) (18 - 18)  SpO2: 98% (23 Dec 2020 10:54) (97% - 98%)    PHYSICAL EXAM:  GENERAL: NAD, well-groomed, well-developed  HEAD:  Atraumatic, Normocephalic  EYES: EOMI, PERRLA, conjunctiva and sclera clear  ENMT: No tonsillar erythema, exudates, or enlargement; Moist mucous membranes, Good dentition, No lesions  NECK: Supple, No JVD, Normal thyroid  NERVOUS SYSTEM:  Alert & Oriented X3, Good concentration; Motor Strength 5/5 B/L upper and lower extremities; DTRs 2+ intact and symmetric  CHEST/LUNG: Clear to percussion bilaterally; No rales, rhonchi, wheezing, or rubs  HEART: Regular rate and rhythm; No murmurs, rubs, or gallops  ABDOMEN: Soft, Nontender, Nondistended; Bowel sounds present  EXTREMITIES:  2+ Peripheral Pulses, No clubbing, cyanosis, or edema  LYMPH: No lymphadenopathy noted  SKIN: No rashes or lesions    LABS:                        11.7   2.54  )-----------( 150      ( 23 Dec 2020 10:08 )             36.1     12-23    140  |  105  |  10  ----------------------------<  140<H>  3.3<L>   |  26  |  1.00    Ca    8.9      23 Dec 2020 10:08  Phos  1.6     12-22  Mg     1.5     12-22    TPro  6.5  /  Alb  3.2<L>  /  TBili  0.8  /  DBili  x   /  AST  68<H>  /  ALT  65  /  AlkPhos  33<L>  12-22    PT/INR - ( 21 Dec 2020 16:56 )   PT: 11.3 sec;   INR: 0.97 ratio         PTT - ( 21 Dec 2020 16:56 )  PTT:26.3 sec    CAPILLARY BLOOD GLUCOSE      POCT Blood Glucose.: 122 mg/dL (23 Dec 2020 11:38)      RADIOLOGY & ADDITIONAL TESTS:    Imaging Personally Reviewed:  [X ] YES  [ ] NO    Consultant(s) Notes Reviewed:  [X] YES  [ ] NO    Care Discussed with Consultants/Other Providers [ X] YES  [ ] NO
HPI:       54 y/o M with PMHx of EtOH abuse, Gastritis and DT's recurrent admissions for abdominal pain, etoh w/drawal; last admitted earlier this month presents to the ED nausea and vomiting x 1 week. Patient reports emesis was initially yellow then noted some dark blood in the last 2 days. Pt also c/o difuse diffuse abdominal pain, joint pains and myalgias. Patient notes he has a family member who has COVID symptoms. Pt denies fever, chills, cough, chest pain, dizziness, diarrhea or dysuria. Patient also states he fell 2 weeks ago while in the bathroom, complaining of HA. Pt reports last drink 3 days ago as he has not been able to keep anything down. Unknown when last scoped.     In ED initial vitals show , BP mildly elevated, rest of vitals stable. For sig labs see below. Pan CT neg for acute pathology. COVID+ ve CXR neg. Pt given PPI, K, IVF and ativan.  (21 Dec 2020 20:15)    Patient is a 55y old  Male who presents with a chief complaint of Alcohol withdrawal, coffee ground emesis, COVID +ve (23 Dec 2020 12:01)      INTERVAL HPI/OVERNIGHT EVENTS: ciwa 4     MEDICATIONS  (STANDING):  atorvastatin 20 milliGRAM(s) Oral at bedtime  chlordiazePOXIDE   Oral   folic acid 1 milliGRAM(s) Oral daily  multivitamin 1 Tablet(s) Oral daily  pantoprazole    Tablet 40 milliGRAM(s) Oral two times a day  potassium phosphate / sodium phosphate Powder (PHOS-NaK) 1 Packet(s) Oral once  thiamine 100 milliGRAM(s) Oral daily    MEDICATIONS  (PRN):  LORazepam   Injectable 1 milliGRAM(s) IV Push every 1 hour PRN CIWA 7 or less  LORazepam   Injectable 2 milliGRAM(s) IV Push every 1 hour PRN CIWA 8 or greater  ondansetron Injectable 4 milliGRAM(s) IV Push every 6 hours PRN Nausea and/or Vomiting      Allergies    No Known Allergies    Intolerances        REVIEW OF SYSTEMS:  CONSTITUTIONAL: No fever, weight loss, or fatigue  EYES: No eye pain, visual disturbances, or discharge  ENMT:  No difficulty hearing, tinnitus, vertigo; No sinus or throat pain  NECK: No pain or stiffness  BREASTS: No pain, masses, or nipple discharge  RESPIRATORY: No cough, wheezing, chills or hemoptysis; No shortness of breath  CARDIOVASCULAR: No chest pain, palpitations, dizziness, or leg swelling  GASTROINTESTINAL: No abdominal or epigastric pain. No nausea, vomiting, or hematemesis; No diarrhea or constipation. No melena or hematochezia.  GENITOURINARY: No dysuria, frequency, hematuria, or incontinence  NEUROLOGICAL: No headaches, memory loss, loss of strength, numbness, or tremors  SKIN: No itching, burning, rashes, or lesions   LYMPH NODES: No enlarged glands  ENDOCRINE: No heat or cold intolerance; No hair loss  MUSCULOSKELETAL: No joint pain or swelling; No muscle, back, or extremity pain  PSYCHIATRIC: No depression, anxiety, mood swings, or difficulty sleeping  HEME/LYMPH: No easy bruising, or bleeding gums  ALLERGY AND IMMUNOLOGIC: No hives or eczema    Vital Signs Last 24 Hrs  T(C): 36.9 (23 Dec 2020 10:54), Max: 37.1 (22 Dec 2020 17:13)  T(F): 98.5 (23 Dec 2020 10:54), Max: 98.7 (22 Dec 2020 17:13)  HR: 88 (23 Dec 2020 10:54) (67 - 88)  BP: 100/65 (23 Dec 2020 10:54) (100/65 - 124/79)  BP(mean): --  RR: 18 (23 Dec 2020 10:54) (18 - 18)  SpO2: 98% (23 Dec 2020 10:54) (97% - 98%)    PHYSICAL EXAM:  GENERAL: NAD, well-groomed, well-developed  HEAD:  Atraumatic, Normocephalic  EYES: EOMI, PERRLA, conjunctiva and sclera clear  ENMT: No tonsillar erythema, exudates, or enlargement; Moist mucous membranes, Good dentition, No lesions  NECK: Supple, No JVD, Normal thyroid  NERVOUS SYSTEM:  Alert & Oriented X3, Good concentration; Motor Strength 5/5 B/L upper and lower extremities; DTRs 2+ intact and symmetric  CHEST/LUNG: Clear to percussion bilaterally; No rales, rhonchi, wheezing, or rubs  HEART: Regular rate and rhythm; No murmurs, rubs, or gallops  ABDOMEN: Soft, Nontender, Nondistended; Bowel sounds present  EXTREMITIES:  2+ Peripheral Pulses, No clubbing, cyanosis, or edema  LYMPH: No lymphadenopathy noted  SKIN: No rashes or lesions    LABS:                        11.7   2.54  )-----------( 150      ( 23 Dec 2020 10:08 )             36.1     12-23    140  |  105  |  10  ----------------------------<  140<H>  3.3<L>   |  26  |  1.00    Ca    8.9      23 Dec 2020 10:08  Phos  1.6     12-22  Mg     1.5     12-22    TPro  6.5  /  Alb  3.2<L>  /  TBili  0.8  /  DBili  x   /  AST  68<H>  /  ALT  65  /  AlkPhos  33<L>  12-22    PT/INR - ( 21 Dec 2020 16:56 )   PT: 11.3 sec;   INR: 0.97 ratio         PTT - ( 21 Dec 2020 16:56 )  PTT:26.3 sec    CAPILLARY BLOOD GLUCOSE      POCT Blood Glucose.: 122 mg/dL (23 Dec 2020 11:38)      RADIOLOGY & ADDITIONAL TESTS:    Imaging Personally Reviewed:  [X ] YES  [ ] NO    Consultant(s) Notes Reviewed:  [X] YES  [ ] NO    Care Discussed with Consultants/Other Providers [ X] YES  [ ] NO
HPI:       54 y/o M with PMHx of EtOH abuse, Gastritis and DT's recurrent admissions for abdominal pain, etoh w/drawal; last admitted earlier this month presents to the ED nausea and vomiting x 1 week. Patient reports emesis was initially yellow then noted some dark blood in the last 2 days. Pt also c/o difuse diffuse abdominal pain, joint pains and myalgias. Patient notes he has a family member who has COVID symptoms. Pt denies fever, chills, cough, chest pain, dizziness, diarrhea or dysuria. Patient also states he fell 2 weeks ago while in the bathroom, complaining of HA. Pt reports last drink 3 days ago as he has not been able to keep anything down. Unknown when last scoped.     In ED initial vitals show , BP mildly elevated, rest of vitals stable. For sig labs see below. Pan CT neg for acute pathology. COVID+ ve CXR neg. Pt given PPI, K, IVF and ativan.  (21 Dec 2020 20:15)    Patient is a 55y old  Male who presents with a chief complaint of Alcohol withdrawal, coffee ground emesis, COVID +ve (23 Dec 2020 12:01)      INTERVAL HPI/OVERNIGHT EVENTS: none. afebrile     MEDICATIONS  (STANDING):  atorvastatin 20 milliGRAM(s) Oral at bedtime  benzonatate 100 milliGRAM(s) Oral three times a day  chlordiazePOXIDE 50 milliGRAM(s) Oral once  chlordiazePOXIDE   Oral   folic acid 1 milliGRAM(s) Oral daily  multivitamin 1 Tablet(s) Oral daily  pantoprazole    Tablet 40 milliGRAM(s) Oral two times a day  thiamine 100 milliGRAM(s) Oral daily    MEDICATIONS  (PRN):  guaiFENesin   Syrup  (Sugar-Free) 200 milliGRAM(s) Oral every 6 hours PRN Cough  LORazepam   Injectable 1 milliGRAM(s) IV Push every 1 hour PRN CIWA 7 or less  LORazepam   Injectable 2 milliGRAM(s) IV Push every 1 hour PRN CIWA 8 or greater  ondansetron Injectable 4 milliGRAM(s) IV Push every 6 hours PRN Nausea and/or Vomiting        Allergies    No Known Allergies    Intolerances        REVIEW OF SYSTEMS:  CONSTITUTIONAL: No fever, weight loss, or fatigue  EYES: No eye pain, visual disturbances, or discharge  ENMT:  No difficulty hearing, tinnitus, vertigo; No sinus or throat pain  NECK: No pain or stiffness  RESPIRATORY: No cough, wheezing, chills or hemoptysis; No shortness of breath  CARDIOVASCULAR: No chest pain, palpitations, dizziness, or leg swelling  GASTROINTESTINAL: mild abdominal pain   GENITOURINARY: No dysuria, frequency, hematuria, or incontinence  NEUROLOGICAL: No headaches, memory loss, loss of strength, numbness, or tremors  SKIN: No itching, burning, rashes, or lesions   LYMPH NODES: No enlarged glands  ENDOCRINE: No heat or cold intolerance; No hair loss  MUSCULOSKELETAL: No joint pain or swelling; No muscle, back, or extremity pain  PSYCHIATRIC: No depression, anxiety, mood swings, or difficulty sleeping  HEME/LYMPH: No easy bruising, or bleeding gums  ALLERGY AND IMMUNOLOGIC: No hives or eczema    Vital Signs Last 24 Hrs  T(C): 36.7 (25 Dec 2020 10:46), Max: 36.7 (24 Dec 2020 16:27)  T(F): 98 (25 Dec 2020 10:46), Max: 98 (24 Dec 2020 16:27)  HR: 72 (25 Dec 2020 10:46) (61 - 72)  BP: 158/78 (25 Dec 2020 10:46) (117/77 - 158/78)  BP(mean): 91 (24 Dec 2020 16:27) (91 - 91)  RR: 18 (25 Dec 2020 10:46) (18 - 18)  SpO2: 96% (25 Dec 2020 10:46) (90% - 97%)      PHYSICAL EXAM:  GENERAL: NAD, well-groomed, well-developed  HEAD:  Atraumatic, Normocephalic  EYES: EOMI, PERRLA, conjunctiva and sclera clear  ENMT: No tonsillar erythema, exudates, or enlargement; Moist mucous membranes, Good dentition, No lesions  NECK: Supple, No JVD, Normal thyroid  NERVOUS SYSTEM:  Alert & Oriented X3, Good concentration; Motor Strength 5/5 B/L upper and lower extremities; DTRs 2+ intact and symmetric  CHEST/LUNG: Clear to percussion bilaterally; No rales, rhonchi, wheezing, or rubs  HEART: Regular rate and rhythm; No murmurs, rubs, or gallops  ABDOMEN: Soft, Nontender, Nondistended; Bowel sounds present  EXTREMITIES:  2+ Peripheral Pulses, No clubbing, cyanosis, or edema  LYMPH: No lymphadenopathy noted  SKIN: No rashes or lesions    LABS:                                         12.4   3.44  )-----------( 107      ( 24 Dec 2020 08:25 )             39.2     12-25    140  |  105  |  17  ----------------------------<  87  3.4<L>   |  26  |  1.09    Ca    9.4      25 Dec 2020 07:33  Phos  3.4     12-25  Mg     1.6     12-25    TPro  8.2  /  Alb  3.8  /  TBili  0.4  /  DBili  x   /  AST  95<H>  /  ALT  100<H>  /  AlkPhos  49  12-25            CAPILLARY BLOOD GLUCOSE      POCT Blood Glucose.: 122 mg/dL (23 Dec 2020 11:38)      RADIOLOGY & ADDITIONAL TESTS:    Imaging Personally Reviewed:  [X ] YES  [ ] NO    Consultant(s) Notes Reviewed:  [X] YES  [ ] NO    Care Discussed with Consultants/Other Providers [ X] YES  [ ] NO
HPI:       54 y/o M with PMHx of EtOH abuse, Gastritis and DT's recurrent admissions for abdominal pain, etoh w/drawal; last admitted earlier this month presents to the ED nausea and vomiting x 1 week. Patient reports emesis was initially yellow then noted some dark blood in the last 2 days. Pt also c/o difuse diffuse abdominal pain, joint pains and myalgias. Patient notes he has a family member who has COVID symptoms. Pt denies fever, chills, cough, chest pain, dizziness, diarrhea or dysuria. Patient also states he fell 2 weeks ago while in the bathroom, complaining of HA. Pt reports last drink 3 days ago as he has not been able to keep anything down. Unknown when last scoped.     In ED initial vitals show , BP mildly elevated, rest of vitals stable. For sig labs see below. Pan CT neg for acute pathology. COVID+ ve CXR neg. Pt given PPI, K, IVF and ativan.  (21 Dec 2020 20:15)    Patient is a 55y old  Male who presents with a chief complaint of Alcohol withdrawal, coffee ground emesis, COVID +ve (25 Dec 2020 12:50)      INTERVAL HPI/OVERNIGHT EVENTS: n o acute events overnight     MEDICATIONS  (STANDING):  atorvastatin 20 milliGRAM(s) Oral at bedtime  benzonatate 100 milliGRAM(s) Oral three times a day  chlordiazePOXIDE 50 milliGRAM(s) Oral once  chlordiazePOXIDE   Oral   folic acid 1 milliGRAM(s) Oral daily  multivitamin 1 Tablet(s) Oral daily  pantoprazole    Tablet 40 milliGRAM(s) Oral two times a day  thiamine 100 milliGRAM(s) Oral daily    MEDICATIONS  (PRN):  guaiFENesin   Syrup  (Sugar-Free) 200 milliGRAM(s) Oral every 6 hours PRN Cough  LORazepam   Injectable 2 milliGRAM(s) IntraMuscular every 6 hours PRN elevated CIWA agiation  ondansetron Injectable 4 milliGRAM(s) IV Push every 6 hours PRN Nausea and/or Vomiting      Allergies    No Known Allergies    Intolerances        REVIEW OF SYSTEMS:  CONSTITUTIONAL: No fever, weight loss, or fatigue  EYES: No eye pain, visual disturbances, or discharge  ENMT:  No difficulty hearing, tinnitus, vertigo; No sinus or throat pain  NECK: No pain or stiffness  BREASTS: No pain, masses, or nipple discharge  RESPIRATORY: No cough, wheezing, chills or hemoptysis; No shortness of breath  CARDIOVASCULAR: No chest pain, palpitations, dizziness, or leg swelling  GASTROINTESTINAL: No abdominal or epigastric pain. No nausea, vomiting, or hematemesis; No diarrhea or constipation. No melena or hematochezia.  GENITOURINARY: No dysuria, frequency, hematuria, or incontinence  NEUROLOGICAL: No headaches, memory loss, loss of strength, numbness, or tremors  SKIN: No itching, burning, rashes, or lesions   LYMPH NODES: No enlarged glands  ENDOCRINE: No heat or cold intolerance; No hair loss  MUSCULOSKELETAL: No joint pain or swelling; No muscle, back, or extremity pain  PSYCHIATRIC: No depression, anxiety, mood swings, or difficulty sleeping  HEME/LYMPH: No easy bruising, or bleeding gums  ALLERGY AND IMMUNOLOGIC: No hives or eczema    Vital Signs Last 24 Hrs  T(C): 36.7 (26 Dec 2020 16:36), Max: 36.7 (26 Dec 2020 16:36)  T(F): 98.1 (26 Dec 2020 16:36), Max: 98.1 (26 Dec 2020 16:36)  HR: 63 (26 Dec 2020 16:36) (55 - 66)  BP: 113/68 (26 Dec 2020 16:36) (109/73 - 131/78)  BP(mean): --  RR: 17 (26 Dec 2020 16:36) (17 - 18)  SpO2: 97% (26 Dec 2020 16:36) (97% - 99%)    PHYSICAL EXAM:  GENERAL: NAD, well-groomed, well-developed  HEAD:  Atraumatic, Normocephalic  EYES: EOMI, PERRLA, conjunctiva and sclera clear  ENMT: No tonsillar erythema, exudates, or enlargement; Moist mucous membranes, Good dentition, No lesions  NECK: Supple, No JVD, Normal thyroid  NERVOUS SYSTEM:  Alert & Oriented X3, Good concentration; Motor Strength 5/5 B/L upper and lower extremities; DTRs 2+ intact and symmetric  CHEST/LUNG: Clear to percussion bilaterally; No rales, rhonchi, wheezing, or rubs  HEART: Regular rate and rhythm; No murmurs, rubs, or gallops  ABDOMEN: Soft, Nontender, Nondistended; Bowel sounds present  EXTREMITIES:  2+ Peripheral Pulses, No clubbing, cyanosis, or edema  LYMPH: No lymphadenopathy noted  SKIN: No rashes or lesions    LABS:    12-25    140  |  105  |  17  ----------------------------<  87  3.4<L>   |  26  |  1.09    Ca    9.4      25 Dec 2020 07:33  Phos  3.4     12-25  Mg     1.6     12-25    TPro  8.2  /  Alb  3.8  /  TBili  0.4  /  DBili  x   /  AST  95<H>  /  ALT  100<H>  /  AlkPhos  49  12-25        CAPILLARY BLOOD GLUCOSE          RADIOLOGY & ADDITIONAL TESTS:    Imaging Personally Reviewed:  [X ] YES  [ ] NO    Consultant(s) Notes Reviewed:  [ X] YES  [ ] NO    Care Discussed with Consultants/Other Providers [X] YES  [ ] NO

## 2020-12-27 ENCOUNTER — TRANSCRIPTION ENCOUNTER (OUTPATIENT)
Age: 55
End: 2020-12-27

## 2020-12-27 VITALS
HEART RATE: 68 BPM | SYSTOLIC BLOOD PRESSURE: 140 MMHG | OXYGEN SATURATION: 98 % | DIASTOLIC BLOOD PRESSURE: 85 MMHG | TEMPERATURE: 98 F | RESPIRATION RATE: 18 BRPM

## 2020-12-27 LAB
ALBUMIN SERPL ELPH-MCNC: 3.5 G/DL — SIGNIFICANT CHANGE UP (ref 3.3–5)
ALP SERPL-CCNC: 44 U/L — SIGNIFICANT CHANGE UP (ref 40–120)
ALT FLD-CCNC: 103 U/L — HIGH (ref 12–78)
ANION GAP SERPL CALC-SCNC: 7 MMOL/L — SIGNIFICANT CHANGE UP (ref 5–17)
AST SERPL-CCNC: 68 U/L — HIGH (ref 15–37)
BILIRUB DIRECT SERPL-MCNC: 0.11 MG/DL — SIGNIFICANT CHANGE UP (ref 0.05–0.2)
BILIRUB INDIRECT FLD-MCNC: 0.1 MG/DL — LOW (ref 0.2–1)
BILIRUB SERPL-MCNC: 0.2 MG/DL — SIGNIFICANT CHANGE UP (ref 0.2–1.2)
BUN SERPL-MCNC: 17 MG/DL — SIGNIFICANT CHANGE UP (ref 7–23)
CALCIUM SERPL-MCNC: 8.9 MG/DL — SIGNIFICANT CHANGE UP (ref 8.5–10.1)
CHLORIDE SERPL-SCNC: 108 MMOL/L — SIGNIFICANT CHANGE UP (ref 96–108)
CO2 SERPL-SCNC: 25 MMOL/L — SIGNIFICANT CHANGE UP (ref 22–31)
CREAT SERPL-MCNC: 1.19 MG/DL — SIGNIFICANT CHANGE UP (ref 0.5–1.3)
GLUCOSE SERPL-MCNC: 115 MG/DL — HIGH (ref 70–99)
HCT VFR BLD CALC: 32.7 % — LOW (ref 39–50)
HGB BLD-MCNC: 10.6 G/DL — LOW (ref 13–17)
MAGNESIUM SERPL-MCNC: 1.6 MG/DL — SIGNIFICANT CHANGE UP (ref 1.6–2.6)
MCHC RBC-ENTMCNC: 27.4 PG — SIGNIFICANT CHANGE UP (ref 27–34)
MCHC RBC-ENTMCNC: 32.4 GM/DL — SIGNIFICANT CHANGE UP (ref 32–36)
MCV RBC AUTO: 84.5 FL — SIGNIFICANT CHANGE UP (ref 80–100)
NRBC # BLD: 0 /100 WBCS — SIGNIFICANT CHANGE UP (ref 0–0)
PHOSPHATE SERPL-MCNC: 2.8 MG/DL — SIGNIFICANT CHANGE UP (ref 2.5–4.5)
PLATELET # BLD AUTO: 225 K/UL — SIGNIFICANT CHANGE UP (ref 150–400)
POTASSIUM SERPL-MCNC: 3.7 MMOL/L — SIGNIFICANT CHANGE UP (ref 3.5–5.3)
POTASSIUM SERPL-SCNC: 3.7 MMOL/L — SIGNIFICANT CHANGE UP (ref 3.5–5.3)
PROT SERPL-MCNC: 7.2 GM/DL — SIGNIFICANT CHANGE UP (ref 6–8.3)
RBC # BLD: 3.87 M/UL — LOW (ref 4.2–5.8)
RBC # FLD: 15.6 % — HIGH (ref 10.3–14.5)
SODIUM SERPL-SCNC: 140 MMOL/L — SIGNIFICANT CHANGE UP (ref 135–145)
WBC # BLD: 2.99 K/UL — LOW (ref 3.8–10.5)
WBC # FLD AUTO: 2.99 K/UL — LOW (ref 3.8–10.5)

## 2020-12-27 PROCEDURE — 99239 HOSP IP/OBS DSCHRG MGMT >30: CPT

## 2020-12-27 RX ORDER — ACETAMINOPHEN 500 MG
650 TABLET ORAL EVERY 6 HOURS
Refills: 0 | Status: DISCONTINUED | OUTPATIENT
Start: 2020-12-27 | End: 2020-12-27

## 2020-12-27 RX ORDER — ASPIRIN/CALCIUM CARB/MAGNESIUM 324 MG
1 TABLET ORAL
Qty: 30 | Refills: 0
Start: 2020-12-27 | End: 2021-01-25

## 2020-12-27 RX ADMIN — PANTOPRAZOLE SODIUM 40 MILLIGRAM(S): 20 TABLET, DELAYED RELEASE ORAL at 06:20

## 2020-12-27 RX ADMIN — Medication 100 MILLIGRAM(S): at 06:20

## 2020-12-27 RX ADMIN — Medication 100 MILLIGRAM(S): at 14:43

## 2020-12-27 RX ADMIN — Medication 1 TABLET(S): at 12:47

## 2020-12-27 RX ADMIN — Medication 100 MILLIGRAM(S): at 12:47

## 2020-12-27 RX ADMIN — Medication 1 MILLIGRAM(S): at 12:47

## 2020-12-27 RX ADMIN — Medication 650 MILLIGRAM(S): at 02:00

## 2020-12-27 RX ADMIN — Medication 650 MILLIGRAM(S): at 01:13

## 2020-12-27 NOTE — DISCHARGE NOTE NURSING/CASE MANAGEMENT/SOCIAL WORK - PATIENT PORTAL LINK FT
You can access the FollowMyHealth Patient Portal offered by [facility] by registering at the following website: [email or web address]. By joining [employer]’s FollowMyHealth portal, you will also be able to view your health information using other applications (apps) compatible with our system.

## 2020-12-27 NOTE — DISCHARGE NOTE PROVIDER - HOSPITAL COURSE
HPI:       56 y/o M with PMHx of EtOH abuse, Gastritis and DT's recurrent admissions for abdominal pain, etoh w/drawal; last admitted earlier this month presents to the ED nausea and vomiting x 1 week. Patient reports emesis was initially yellow then noted some dark blood in the last 2 days. Pt also c/o difuse diffuse abdominal pain, joint pains and myalgias. Patient notes he has a family member who has COVID symptoms. Pt denies fever, chills, cough, chest pain, dizziness, diarrhea or dysuria. Patient also states he fell 2 weeks ago while in the bathroom, complaining of HA. Pt reports last drink 3 days ago as he has not been able to keep anything down. Unknown when last scoped.     In ED initial vitals show , BP mildly elevated, rest of vitals stable. For sig labs see below. Pan CT neg for acute pathology. COVID+ ve CXR neg. Pt given PPI, K, IVF and ativan.  (21 Dec 2020 20:15)      Pt is medically stable to be discharged and CBC remains stable. He is a known alcoholic well known to this hospital and covid ++. Although he is asymptomatic he could be infectious still and will almost certainly not comply with quarantining at  home and go back to drinking. repeat covid remains positive

## 2020-12-27 NOTE — DISCHARGE NOTE PROVIDER - NSDCCPCAREPLAN_GEN_ALL_CORE_FT
PRINCIPAL DISCHARGE DIAGNOSIS  Diagnosis: Alcohol withdrawal  Assessment and Plan of Treatment: please refrain from drinking      SECONDARY DISCHARGE DIAGNOSES  Diagnosis: COVID-19  Assessment and Plan of Treatment: please quarentine for 14 days please take an asprin wear a mask    Diagnosis: Alcohol withdrawal  Assessment and Plan of Treatment:

## 2020-12-27 NOTE — DISCHARGE NOTE PROVIDER - NSDCMRMEDTOKEN_GEN_ALL_CORE_FT
Aspirin Low Dose 81 mg oral delayed release tablet: 1 tab(s) orally once a day   atorvastatin 20 mg oral tablet: 1 tab(s) orally once a day (at bedtime)  folic acid 1 mg oral tablet: 1 tab(s) orally once a day  magnesium oxide 500 mg oral tablet: 1 tab(s) orally once a day  Multiple Vitamins oral tablet: 1 tab(s) orally once a day  omeprazole 40 mg oral delayed release capsule: 1 cap(s) orally once a day  thiamine 100 mg oral tablet: 1 tab(s) orally once a day

## 2020-12-29 DIAGNOSIS — Z71.89 OTHER SPECIFIED COUNSELING: ICD-10-CM

## 2020-12-30 DIAGNOSIS — F10.239 ALCOHOL DEPENDENCE WITH WITHDRAWAL, UNSPECIFIED: ICD-10-CM

## 2020-12-30 DIAGNOSIS — E83.42 HYPOMAGNESEMIA: ICD-10-CM

## 2020-12-30 DIAGNOSIS — K92.0 HEMATEMESIS: ICD-10-CM

## 2020-12-30 DIAGNOSIS — E83.39 OTHER DISORDERS OF PHOSPHORUS METABOLISM: ICD-10-CM

## 2020-12-30 DIAGNOSIS — K70.10 ALCOHOLIC HEPATITIS WITHOUT ASCITES: ICD-10-CM

## 2020-12-30 DIAGNOSIS — E78.2 MIXED HYPERLIPIDEMIA: ICD-10-CM

## 2020-12-30 DIAGNOSIS — U07.1 COVID-19: ICD-10-CM

## 2020-12-30 DIAGNOSIS — E86.0 DEHYDRATION: ICD-10-CM

## 2020-12-30 DIAGNOSIS — K29.71 GASTRITIS, UNSPECIFIED, WITH BLEEDING: ICD-10-CM

## 2020-12-30 DIAGNOSIS — Z91.81 HISTORY OF FALLING: ICD-10-CM

## 2021-01-01 NOTE — ED PROVIDER NOTE - CPE EDP NEURO NORM
Problem:  Care  Goal: [place] assessment and vital signs within acceptable range  Outcome: Outcome Met, Continue evaluating goal progress toward completion      normal...

## 2021-01-06 ENCOUNTER — EMERGENCY (EMERGENCY)
Facility: HOSPITAL | Age: 56
LOS: 0 days | Discharge: HOME | End: 2021-01-07
Attending: STUDENT IN AN ORGANIZED HEALTH CARE EDUCATION/TRAINING PROGRAM
Payer: MEDICAID

## 2021-01-06 VITALS
RESPIRATION RATE: 18 BRPM | WEIGHT: 166.89 LBS | HEART RATE: 112 BPM | HEIGHT: 67 IN | DIASTOLIC BLOOD PRESSURE: 80 MMHG | TEMPERATURE: 98 F | SYSTOLIC BLOOD PRESSURE: 146 MMHG | OXYGEN SATURATION: 96 %

## 2021-01-06 DIAGNOSIS — R00.0 TACHYCARDIA, UNSPECIFIED: ICD-10-CM

## 2021-01-06 DIAGNOSIS — E11.9 TYPE 2 DIABETES MELLITUS WITHOUT COMPLICATIONS: ICD-10-CM

## 2021-01-06 DIAGNOSIS — R11.10 VOMITING, UNSPECIFIED: ICD-10-CM

## 2021-01-06 DIAGNOSIS — E78.5 HYPERLIPIDEMIA, UNSPECIFIED: ICD-10-CM

## 2021-01-06 DIAGNOSIS — R52 PAIN, UNSPECIFIED: ICD-10-CM

## 2021-01-06 DIAGNOSIS — R11.2 NAUSEA WITH VOMITING, UNSPECIFIED: ICD-10-CM

## 2021-01-06 DIAGNOSIS — K29.70 GASTRITIS, UNSPECIFIED, WITHOUT BLEEDING: ICD-10-CM

## 2021-01-06 DIAGNOSIS — Z79.82 LONG TERM (CURRENT) USE OF ASPIRIN: ICD-10-CM

## 2021-01-06 DIAGNOSIS — K27.9 PEPTIC ULCER, SITE UNSPECIFIED, UNSPECIFIED AS ACUTE OR CHRONIC, WITHOUT HEMORRHAGE OR PERFORATION: ICD-10-CM

## 2021-01-06 DIAGNOSIS — F10.10 ALCOHOL ABUSE, UNCOMPLICATED: ICD-10-CM

## 2021-01-06 PROCEDURE — 99285 EMERGENCY DEPT VISIT HI MDM: CPT

## 2021-01-06 RX ORDER — PANTOPRAZOLE SODIUM 20 MG/1
80 TABLET, DELAYED RELEASE ORAL ONCE
Refills: 0 | Status: DISCONTINUED | OUTPATIENT
Start: 2021-01-06 | End: 2021-01-07

## 2021-01-06 RX ORDER — ONDANSETRON 8 MG/1
4 TABLET, FILM COATED ORAL ONCE
Refills: 0 | Status: DISCONTINUED | OUTPATIENT
Start: 2021-01-06 | End: 2021-01-07

## 2021-01-06 RX ORDER — THIAMINE MONONITRATE (VIT B1) 100 MG
500 TABLET ORAL ONCE
Refills: 0 | Status: DISCONTINUED | OUTPATIENT
Start: 2021-01-06 | End: 2021-01-07

## 2021-01-06 RX ORDER — SODIUM CHLORIDE 9 MG/ML
1000 INJECTION INTRAMUSCULAR; INTRAVENOUS; SUBCUTANEOUS ONCE
Refills: 0 | Status: DISCONTINUED | OUTPATIENT
Start: 2021-01-06 | End: 2021-01-07

## 2021-01-06 RX ADMIN — Medication 50 MILLIGRAM(S): at 23:31

## 2021-01-06 NOTE — ED ADULT NURSE NOTE - IS THE PATIENT ABLE TO BE SCREENED?
OFFICE VISIT        Patient: Thomas Bailey Date of Service: 2020   : 1977 MRN: [id number]     SUBJECTIVE:     HISTORY OF PRESENT ILLNESS:  Thomas Bailey is a 42 year old male who presents today for follow up.    Patient has had some urethral discomfort and discharge -- patient was tested at [facility] for gonorrhea/chlamydia -- patient is having discharge    Patient had testing at [facility] yesterday that was negative for gonorrhea/chlamydia -- is having testing for mycoplasma genitalium          Review of Systems   Constitutional: Negative for chills and fever.   Gastrointestinal:        Anal lesion   Genitourinary:        Dysuria and discharge       MEDICATIONS:  Current Outpatient Medications   Medication Sig   • DESCOVY 200-25 MG per tablet TK 1 T PO  QD     No current facility-administered medications for this visit.        ALLERGIES:  ALLERGIES:  No Known Allergies      HISTORIES:   PAST MEDICAL HISTORY:  Past Medical History:   Diagnosis Date   • Prostatitis        PAST SURGICAL HISTORY:  Past Surgical History:   Procedure Laterality Date   • No past surgeries         FAMILY HISTORY:  Family History   Problem Relation Age of Onset   • Heart disease Other        SOCIAL HISTORY:  Social History     Occupational History   • Not on file   Tobacco Use   • Smoking status: Never Smoker   • Smokeless tobacco: Never Used   Substance and Sexual Activity   • Alcohol use: Yes     Frequency: 2-3 times a week     Drinks per session: 1 or 2     Binge frequency: Never   • Drug use: Never   • Sexual activity: Yes     Partners: Male         OBJECTIVE:     Visit Vitals  /69 (BP Location: LUE - Left upper extremity, Patient Position: Sitting, Cuff Size: Regular)   Pulse 61   Temp 96.7 °F (35.9 °C) (Tympanic)   Resp 18   Ht 5' 11\" (1.803 m)   Wt 71.6 kg (157 lb 15.4 oz)   SpO2 98%   BMI 22.03 kg/m²     Physical Exam   Constitutional: He is oriented to person, place, and time. He appears well-developed and well-nourished.    HENT:   Head: Normocephalic and atraumatic.   Neck: Normal range of motion.   Cardiovascular: Normal rate.   Pulmonary/Chest: Effort normal. No respiratory distress.   Abdominal: He exhibits no distension.   Genitourinary:    Genitourinary Comments: Question internal hemorrhoid versus HPV   Musculoskeletal: Normal range of motion.         General: No tenderness, deformity or edema.     Neurological: He is alert and oriented to person, place, and time. He has normal reflexes.   Skin: Skin is warm and dry.   Psychiatric: He has a normal mood and affect. His behavior is normal. Judgment and thought content normal.       Assessment AND PLAN:     Problem List Items Addressed This Visit        Nervous    Dysuria - Primary       Digestive    Anal lesion    Relevant Orders    SERVICE TO SURGERY COLORECTAL       Urinary    Penile discharge       Other    Exposure to HIV    Needs flu shot    Relevant Orders    INFLUENZA QUADRIVALENT SPLIT PRES FREE 0.5 ML VACC, IM (FLULAVAL,FLUARIX,FLUZONE) (Completed)    Need for vaccination    Relevant Orders    TETANUS DIPHTHERIA ACELLULAR PERTUSSIS VACC, 11+ YRS (ADACEL) (Completed)         Patient is here for follow-up.    Patient has some discharge so he is following up with [facility] that is checking him for mycoplasma genitalia.    Patient continues on HIV prevention.    Patient has an anal lesion so he will be referred to colorectal surgery.    Patient to contact me with any problems or concerns but otherwise will have further plans based on the lab test on the recommendation of consultants.    Patient will also have immunizations    Vital signs and nursing notes reviewed.     Patient's allergies, medications, problem list, medical history, surgical history, tobacco history and family history were all reviewed in conjunction with this visit.     Return as needed.  · Instructions provided as documented in the AVS.  · The patient indicated understanding of the  diagnosis and agreed with the plan of care.    Sly Meneses MD           Yes

## 2021-01-06 NOTE — ED ADULT NURSE NOTE - ED STAT RN HANDOFF DETAILS 2
Report given by Saw salazar. Patient alert and awake in bed with complaints of shaking and head and general pain 10/10. Awaiting pickup. Report given by Saw salazar. Patient alert and awake in bed with complaints of shaking which was not observed and head and general pain 10/10. Awaiting pickup.

## 2021-01-06 NOTE — ED ADULT NURSE NOTE - ED STAT RN HANDOFF DETAILS
Report endorsed to oncoming RN Zeina. Safety checks completed this shift. Safety rounds completed hourly.  Awaiting DC.

## 2021-01-06 NOTE — ED ADULT NURSE NOTE - OBJECTIVE STATEMENT
pt c/o abd pain, vomiting today.  pt daily drinker, last drink at 10am.  no noted vomiting while in ED at this time.

## 2021-01-06 NOTE — ED ADULT NURSE NOTE - NSIMPLEMENTINTERV_GEN_ALL_ED
Implemented All Fall Risk Interventions:  [place] to call system. Call bell, personal items and telephone within reach. Instruct patient to call for assistance. Room bathroom lighting operational. Non-slip footwear when patient is off stretcher. Physically safe environment: no spills, clutter or unnecessary equipment. Stretcher in lowest position, wheels locked, appropriate side rails in place. Provide visual cue, wrist band, yellow gown, etc. Monitor gait and stability. Monitor for mental status changes and reorient to person, place, and time. Review medications for side effects contributing to fall risk. Reinforce activity limits and safety measures with patient and family.

## 2021-01-07 VITALS
OXYGEN SATURATION: 96 % | SYSTOLIC BLOOD PRESSURE: 139 MMHG | TEMPERATURE: 98 F | HEART RATE: 105 BPM | RESPIRATION RATE: 20 BRPM | DIASTOLIC BLOOD PRESSURE: 88 MMHG

## 2021-01-07 LAB
ALBUMIN SERPL ELPH-MCNC: 4 G/DL — SIGNIFICANT CHANGE UP (ref 3.3–5)
ALP SERPL-CCNC: 45 U/L — SIGNIFICANT CHANGE UP (ref 40–120)
ALT FLD-CCNC: 55 U/L — SIGNIFICANT CHANGE UP (ref 12–78)
ANION GAP SERPL CALC-SCNC: 16 MMOL/L — SIGNIFICANT CHANGE UP (ref 5–17)
APAP SERPL-MCNC: < 2 UG/ML (ref 10–30)
AST SERPL-CCNC: 59 U/L — HIGH (ref 15–37)
BASOPHILS # BLD AUTO: 0.07 K/UL — SIGNIFICANT CHANGE UP (ref 0–0.2)
BASOPHILS NFR BLD AUTO: 1.4 % — SIGNIFICANT CHANGE UP (ref 0–2)
BILIRUB SERPL-MCNC: 0.4 MG/DL — SIGNIFICANT CHANGE UP (ref 0.2–1.2)
BLD GP AB SCN SERPL QL: SIGNIFICANT CHANGE UP
BUN SERPL-MCNC: 11 MG/DL — SIGNIFICANT CHANGE UP (ref 7–23)
CALCIUM SERPL-MCNC: 8.7 MG/DL — SIGNIFICANT CHANGE UP (ref 8.5–10.1)
CHLORIDE SERPL-SCNC: 105 MMOL/L — SIGNIFICANT CHANGE UP (ref 96–108)
CO2 SERPL-SCNC: 23 MMOL/L — SIGNIFICANT CHANGE UP (ref 22–31)
CREAT SERPL-MCNC: 0.95 MG/DL — SIGNIFICANT CHANGE UP (ref 0.5–1.3)
EOSINOPHIL # BLD AUTO: 0.07 K/UL — SIGNIFICANT CHANGE UP (ref 0–0.5)
EOSINOPHIL NFR BLD AUTO: 1.4 % — SIGNIFICANT CHANGE UP (ref 0–6)
ETHANOL SERPL-MCNC: 287 MG/DL — HIGH (ref 0–10)
GLUCOSE SERPL-MCNC: 82 MG/DL — SIGNIFICANT CHANGE UP (ref 70–99)
HCT VFR BLD CALC: 39 % — SIGNIFICANT CHANGE UP (ref 39–50)
HGB BLD-MCNC: 12.5 G/DL — LOW (ref 13–17)
IMM GRANULOCYTES NFR BLD AUTO: 0 % — SIGNIFICANT CHANGE UP (ref 0–1.5)
LIDOCAIN IGE QN: 309 U/L — SIGNIFICANT CHANGE UP (ref 73–393)
LYMPHOCYTES # BLD AUTO: 3.45 K/UL — HIGH (ref 1–3.3)
LYMPHOCYTES # BLD AUTO: 69.8 % — HIGH (ref 13–44)
MCHC RBC-ENTMCNC: 26.2 PG — LOW (ref 27–34)
MCHC RBC-ENTMCNC: 32.1 GM/DL — SIGNIFICANT CHANGE UP (ref 32–36)
MCV RBC AUTO: 81.6 FL — SIGNIFICANT CHANGE UP (ref 80–100)
MONOCYTES # BLD AUTO: 0.3 K/UL — SIGNIFICANT CHANGE UP (ref 0–0.9)
MONOCYTES NFR BLD AUTO: 6.1 % — SIGNIFICANT CHANGE UP (ref 2–14)
NEUTROPHILS # BLD AUTO: 1.05 K/UL — LOW (ref 1.8–7.4)
NEUTROPHILS NFR BLD AUTO: 21.3 % — LOW (ref 43–77)
NRBC # BLD: 0 /100 WBCS — SIGNIFICANT CHANGE UP (ref 0–0)
PLATELET # BLD AUTO: 294 K/UL — SIGNIFICANT CHANGE UP (ref 150–400)
POTASSIUM SERPL-MCNC: 3.5 MMOL/L — SIGNIFICANT CHANGE UP (ref 3.5–5.3)
POTASSIUM SERPL-SCNC: 3.5 MMOL/L — SIGNIFICANT CHANGE UP (ref 3.5–5.3)
PROT SERPL-MCNC: 8.7 GM/DL — HIGH (ref 6–8.3)
RAPID RVP RESULT: DETECTED
RBC # BLD: 4.78 M/UL — SIGNIFICANT CHANGE UP (ref 4.2–5.8)
RBC # FLD: 16 % — HIGH (ref 10.3–14.5)
SARS-COV-2 RNA SPEC QL NAA+PROBE: DETECTED
SODIUM SERPL-SCNC: 144 MMOL/L — SIGNIFICANT CHANGE UP (ref 135–145)
WBC # BLD: 4.94 K/UL — SIGNIFICANT CHANGE UP (ref 3.8–10.5)
WBC # FLD AUTO: 4.94 K/UL — SIGNIFICANT CHANGE UP (ref 3.8–10.5)

## 2021-01-07 PROCEDURE — 93010 ELECTROCARDIOGRAM REPORT: CPT

## 2021-01-07 PROCEDURE — 71045 X-RAY EXAM CHEST 1 VIEW: CPT | Mod: 26

## 2021-01-07 RX ORDER — FAMOTIDINE 10 MG/ML
1 INJECTION INTRAVENOUS
Qty: 10 | Refills: 0
Start: 2021-01-07 | End: 2021-01-16

## 2021-01-07 RX ORDER — ONDANSETRON 8 MG/1
4 TABLET, FILM COATED ORAL ONCE
Refills: 0 | Status: COMPLETED | OUTPATIENT
Start: 2021-01-07 | End: 2021-01-07

## 2021-01-07 RX ORDER — SUCRALFATE 1 G
1 TABLET ORAL ONCE
Refills: 0 | Status: DISCONTINUED | OUTPATIENT
Start: 2021-01-07 | End: 2021-01-07

## 2021-01-07 RX ORDER — THIAMINE MONONITRATE (VIT B1) 100 MG
500 TABLET ORAL ONCE
Refills: 0 | Status: COMPLETED | OUTPATIENT
Start: 2021-01-07 | End: 2021-01-07

## 2021-01-07 RX ORDER — SUCRALFATE 1 G
1 TABLET ORAL ONCE
Refills: 0 | Status: COMPLETED | OUTPATIENT
Start: 2021-01-07 | End: 2021-01-07

## 2021-01-07 RX ORDER — ONDANSETRON 8 MG/1
4 TABLET, FILM COATED ORAL ONCE
Refills: 0 | Status: DISCONTINUED | OUTPATIENT
Start: 2021-01-07 | End: 2021-01-07

## 2021-01-07 RX ORDER — THIAMINE MONONITRATE (VIT B1) 100 MG
100 TABLET ORAL ONCE
Refills: 0 | Status: DISCONTINUED | OUTPATIENT
Start: 2021-01-07 | End: 2021-01-07

## 2021-01-07 RX ORDER — SODIUM CHLORIDE 9 MG/ML
1000 INJECTION INTRAMUSCULAR; INTRAVENOUS; SUBCUTANEOUS ONCE
Refills: 0 | Status: COMPLETED | OUTPATIENT
Start: 2021-01-07 | End: 2021-01-07

## 2021-01-07 RX ORDER — SUCRALFATE 1 G
1 TABLET ORAL
Qty: 20 | Refills: 0
Start: 2021-01-07 | End: 2021-01-16

## 2021-01-07 RX ORDER — MORPHINE SULFATE 50 MG/1
4 CAPSULE, EXTENDED RELEASE ORAL ONCE
Refills: 0 | Status: DISCONTINUED | OUTPATIENT
Start: 2021-01-07 | End: 2021-01-07

## 2021-01-07 RX ADMIN — SODIUM CHLORIDE 1000 MILLILITER(S): 9 INJECTION INTRAMUSCULAR; INTRAVENOUS; SUBCUTANEOUS at 02:38

## 2021-01-07 RX ADMIN — Medication 500 MILLIGRAM(S): at 02:30

## 2021-01-07 RX ADMIN — Medication 1 GRAM(S): at 03:32

## 2021-01-07 RX ADMIN — Medication 2 MILLIGRAM(S): at 05:58

## 2021-01-07 RX ADMIN — ONDANSETRON 4 MILLIGRAM(S): 8 TABLET, FILM COATED ORAL at 01:38

## 2021-01-07 RX ADMIN — MORPHINE SULFATE 4 MILLIGRAM(S): 50 CAPSULE, EXTENDED RELEASE ORAL at 01:54

## 2021-01-07 RX ADMIN — SODIUM CHLORIDE 1000 MILLILITER(S): 9 INJECTION INTRAMUSCULAR; INTRAVENOUS; SUBCUTANEOUS at 01:38

## 2021-01-07 RX ADMIN — Medication 105 MILLIGRAM(S): at 01:30

## 2021-01-07 NOTE — ED PROVIDER NOTE - PHYSICAL EXAMINATION
VITAL SIGNS: I have reviewed nursing notes and confirm.   GEN: Well-developed; well-nourished; in no acute distress. Speaking full sentences.  SKIN: Warm, pink, no rash, no diaphoresis, no cyanosis, well perfused.   HEAD: Normocephalic; atraumatic. No scalp lacerations, no abrasions.  NECK: Supple; non tender.   EYES: Pupils 3mm equal, round, reactive to light and accomodation, conjunctiva and sclera clear. Extra-ocular movements intact bilaterally.  ENT: No nasal discharge; airway clear. Trachea is midline. ORAL: No oropharyngeal exudates or erythema. Normal dentition.  CV: Regular rate and rhythm. S1, S2 normal; no murmurs, gallops, or rubs. No lower extremity pitting edema bilaterally. Capillary refill < 2 seconds throughout. Distal pulses intact 2+ throughout.  RESP: CTA bilaterally. No wheezes, rales, or rhonchi.   ABD: Normal bowel sounds, soft, non-distended, non-tender, no hepatosplenomegaly. No CVA tenderness bilaterally.  MSK: Normal range of motion and movement of all 4 extremities. No joint or muscular pain throughout. No clubbing.   BACK: No thoracolumbar midline or paravertebral tenderness. No step-offs or obvious deformities.  NEURO: Alert & oriented x 3, Grossly unremarkable. Sensory and motor intact throughout. No focal deficits. Gait: Fluid. Normal speech and coordination.   PSYCH: Cooperative, appropriate. VITAL SIGNS: I have reviewed nursing notes and confirm.   GEN: Well-developed; mal-nourished; in no acute distress. Speaking full sentences. (+) holding up hands and trembling. (+) tongue fasciculations.  SKIN: Warm, pink, no rash, no diaphoresis, no cyanosis, well perfused.   HEAD: Normocephalic; atraumatic. No scalp lacerations, no abrasions.  NECK: Supple; non tender.   EYES: Pupils 3mm equal, round, reactive to light and accomodation, conjunctiva and sclera clear. Extra-ocular movements intact bilaterally.  ENT: No nasal discharge; airway clear. Trachea is midline. ORAL: No oropharyngeal exudates or erythema. Normal dentition.  CV: (+) tachycardic. S1, S2 normal; no murmurs, gallops, or rubs. No lower extremity pitting edema bilaterally. Capillary refill < 2 seconds throughout. Distal pulses intact 2+ throughout.  RESP: CTA bilaterally. No wheezes, rales, or rhonchi.   ABD: Normal bowel sounds, soft, non-distended, non-tender, no hepatosplenomegaly. No CVA tenderness bilaterally.  MSK: Normal range of motion and movement of all 4 extremities. No joint or muscular pain throughout. No clubbing.   BACK: No thoracolumbar midline or paravertebral tenderness. No step-offs or obvious deformities.  NEURO: Alert & oriented x 1, name, Grossly unremarkable. Sensory and motor intact throughout. No focal deficits. Normal speech and coordination.

## 2021-01-07 NOTE — ED PROVIDER NOTE - CARE PLAN
Principal Discharge DX:	Vomiting, intractability of vomiting not specified, presence of nausea not specified, unspecified vomiting type

## 2021-01-07 NOTE — ED PROVIDER NOTE - PATIENT PORTAL LINK FT
You can access the FollowMyHealth Patient Portal offered by [facility] by registering at the following website: [email or web address]. By joining [employer]’s FollowMyHealth portal, you will also be able to view your health information using other applications (apps) compatible with our system.

## 2021-01-07 NOTE — ED PROVIDER NOTE - CLINICAL SUMMARY MEDICAL DECISION MAKING FREE TEXT BOX
well known to ED, hx of substance abuse, hx of etoh abuse, etoh w/d, DTs, gastritis presenting with vomiting today. Was evaluated 12/21 w/ normal pan-CT and d/c'd on 12/28th for similar presentation w/ stable hb and no EGD due to (+) covid status.   - zofran, IVF, labs, re-eval  - pain control as needed.   - re-eval

## 2021-01-07 NOTE — ED PROVIDER NOTE - PROGRESS NOTE DETAILS
labs unremarkable, hb 12 from 10.6 since d/c'd from [facility] inpatient 12/27. currently in no acute ETOH w/d. likely gastritis since patient already had negative workup as inpatient recently. The patient is hemodynamically stable, clinically well-appearing, mentating at baseline, and ambulatory for discharge home. The patient is clinically sober. The patient is alert and oriented x 3, is clear and coherent in conversation and has a normal gait and shows no signs of acute intoxication. The patient is safe for discharge.

## 2021-01-07 NOTE — ED PROVIDER NOTE - NSFOLLOWUPINSTRUCTIONS_ED_ALL_ED_FT
Gastritis    Gastritis is soreness and swelling (inflammation) of the lining of the stomach. Gastritis can develop as a sudden onset (acute) or long-term (chronic) condition. If gastritis is not treated, it can lead to stomach bleeding and ulcers. Causes include viral and bacterial infections, excessive alcohol consumption, tobacco use, or certain medications. Symptoms include nausea, vomiting, or abdominal pain or burning especially after eating. Avoid foods or drinks that make your symptoms worse such as caffeine, chocolate, spicy foods, acidic foods, or alcohol.    SEEK IMMEDIATE MEDICAL CARE IF YOU HAVE ANY OF THE FOLLOWING SYMPTOMS: black or bloody stools, blood or coffee-ground-colored vomitus, worsening abdominal pain, fever, or inability to keep fluids down.     Take acetaminophen 650 mg orally every 6-8 hours for pain control as needed. Please do not exceed 4,000 mg of acetaminophen during a 24 hours period. Acetaminophen can be found in many over-the-counter cold medications as well as opioid medications that may be given for pain.    Take ibuprofen (also known as MOTRIN or ADVIL) 400 mg orally every 6-8 hours for pain control as needed with food to avoid an upset stomach. Ibuprofen can be found in many over-the-counter medications. Please do not take ibuprofen if you have a bleeding disorder, stomach or gastrointestinal ulcer, or liver disease.    If needed, you can alternate these medications so that you can take one medication every 3 hours. For example, at noon take ibuprofen, then at 3PM take acetaminophen, then at 6PM take ibuprofen.     Rest, drink plenty of fluids.  Advance activity as tolerated.  Continue all previously prescribed medications as directed.  Follow up with your PMD 2-3 days and bring copies of your results.

## 2021-01-07 NOTE — ED PROVIDER NOTE - OBJECTIVE STATEMENT
55M PMHx of alcohol abuse (hx of DTs, ETOH w/d, multiple ED/admits visits), gastritis presenting with nausea/vomiting, and shakes today. Patient is a poor historian, but is well known to the ED. 55M PMHx of alcohol abuse (hx of DTs, ETOH w/d, multiple ED/admits visits), gastritis presenting with nausea/vomiting, and shakes today. Patient is a poor historian, but is well known to the ED. Appears tremulous in the ED, complaining of typical epigastric abdominal pain x few days. (+) 1 episode of vomiting in the ED. Denies any fevers, chills, chest pain, shortness of breath, headaches, syncope, dysuria/hematuria. Of note was recently admitted for ETOH w/d, hematemesis but had a stable Hb, but did not get a EGD due to covid (+).

## 2021-01-12 ENCOUNTER — EMERGENCY (EMERGENCY)
Facility: HOSPITAL | Age: 56
LOS: 0 days | Discharge: HOME | End: 2021-01-12
Attending: STUDENT IN AN ORGANIZED HEALTH CARE EDUCATION/TRAINING PROGRAM
Payer: MEDICAID

## 2021-01-12 VITALS
SYSTOLIC BLOOD PRESSURE: 134 MMHG | HEIGHT: 67 IN | TEMPERATURE: 99 F | HEART RATE: 108 BPM | WEIGHT: 169.98 LBS | OXYGEN SATURATION: 100 % | RESPIRATION RATE: 17 BRPM | DIASTOLIC BLOOD PRESSURE: 92 MMHG

## 2021-01-12 DIAGNOSIS — R10.9 UNSPECIFIED ABDOMINAL PAIN: ICD-10-CM

## 2021-01-12 DIAGNOSIS — R11.2 NAUSEA WITH VOMITING, UNSPECIFIED: ICD-10-CM

## 2021-01-12 DIAGNOSIS — E78.2 MIXED HYPERLIPIDEMIA: ICD-10-CM
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
## 2021-01-12 DIAGNOSIS — Z79.82 LONG TERM (CURRENT) USE OF ASPIRIN: ICD-10-CM

## 2021-01-12 DIAGNOSIS — K27.9 PEPTIC ULCER, SITE UNSPECIFIED, UNSPECIFIED AS ACUTE OR CHRONIC, WITHOUT HEMORRHAGE OR PERFORATION: ICD-10-CM

## 2021-01-12 DIAGNOSIS — U07.1 COVID-19: ICD-10-CM

## 2021-01-12 DIAGNOSIS — R11.11 VOMITING WITHOUT NAUSEA: ICD-10-CM

## 2021-01-12 DIAGNOSIS — F19.10 OTHER PSYCHOACTIVE SUBSTANCE ABUSE, UNCOMPLICATED: ICD-10-CM

## 2021-01-12 DIAGNOSIS — K29.70 GASTRITIS, UNSPECIFIED, WITHOUT BLEEDING: ICD-10-CM

## 2021-01-12 DIAGNOSIS — F10.10 ALCOHOL ABUSE, UNCOMPLICATED: ICD-10-CM

## 2021-01-12 LAB — ETHANOL SERPL-MCNC: 160 MG/DL — HIGH (ref 0–10)

## 2021-01-12 PROCEDURE — 99284 EMERGENCY DEPT VISIT MOD MDM: CPT

## 2021-01-12 RX ORDER — FAMOTIDINE 10 MG/ML
40 INJECTION INTRAVENOUS ONCE
Refills: 0 | Status: COMPLETED | OUTPATIENT
Start: 2021-01-12 | End: 2021-01-12

## 2021-01-12 RX ORDER — METOCLOPRAMIDE HCL 10 MG
10 TABLET ORAL ONCE
Refills: 0 | Status: COMPLETED | OUTPATIENT
Start: 2021-01-12 | End: 2021-01-12

## 2021-01-12 RX ORDER — SUCRALFATE 1 G
1 TABLET ORAL ONCE
Refills: 0 | Status: COMPLETED | OUTPATIENT
Start: 2021-01-12 | End: 2021-01-12

## 2021-01-12 RX ORDER — FOLIC ACID 0.8 MG
1 TABLET ORAL ONCE
Refills: 0 | Status: COMPLETED | OUTPATIENT
Start: 2021-01-12 | End: 2021-01-12

## 2021-01-12 RX ORDER — THIAMINE MONONITRATE (VIT B1) 100 MG
100 TABLET ORAL ONCE
Refills: 0 | Status: COMPLETED | OUTPATIENT
Start: 2021-01-12 | End: 2021-01-12

## 2021-01-12 RX ADMIN — Medication 1 MILLIGRAM(S): at 02:17

## 2021-01-12 RX ADMIN — Medication 100 MILLIGRAM(S): at 02:17

## 2021-01-12 RX ADMIN — FAMOTIDINE 40 MILLIGRAM(S): 10 INJECTION INTRAVENOUS at 02:17

## 2021-01-12 RX ADMIN — Medication 1 GRAM(S): at 02:17

## 2021-01-12 RX ADMIN — Medication 10 MILLIGRAM(S): at 02:17

## 2021-01-12 RX ADMIN — Medication 30 MILLILITER(S): at 02:17

## 2021-01-12 NOTE — ED ADULT NURSE NOTE - NSIMPLEMENTINTERV_GEN_ALL_ED
Implemented All Fall Risk Interventions:  [place] to call system. Call bell, personal items and telephone within reach. Instruct patient to call for assistance. Room bathroom lighting operational. Non-slip footwear when patient is off stretcher. Physically safe environment: no spills, clutter or unnecessary equipment. Stretcher in lowest position, wheels locked, appropriate side rails in place. Provide visual cue, wrist band, yellow gown, etc. Monitor gait and stability. Monitor for mental status changes and reorient to person, place, and time. Review medications for side effects contributing to fall risk. Reinforce activity limits and safety measures with patient and family.

## 2021-01-12 NOTE — ED PROVIDER NOTE - PATIENT PORTAL LINK FT
You can access the FollowMyHealth Patient Portal offered by [facility] by registering at the following website: [email or web address]. By joining [facility]’s FollowMyHealth portal, you will also be able to view your health information using other applications (apps) compatible with our system.

## 2021-01-12 NOTE — ED ADULT NURSE NOTE - OBJECTIVE STATEMENT
Hx of alcohol abuse, recent diagnosis of covid-19, c/o vomiting x 7 days and inability to keep food down. Endorses last drink was 7 days ago. Pt agitated, shaking. No vomiting in ED. Requesting to be retested for covid and a "pain shot". ETOH blood level 160.

## 2021-01-12 NOTE — ED PROVIDER NOTE - PHYSICAL EXAMINATION
VITAL SIGNS: I have reviewed nursing notes and confirm.   GEN: Well-developed; (+) disheveled, malnourished; in no acute distress. Speaking full sentences.  SKIN: Warm, pink, no rash, no diaphoresis, no cyanosis, well perfused.   HEAD: Normocephalic; atraumatic. No scalp lacerations, no abrasions.  NECK: Supple; non tender.   EYES: Pupils 3mm equal, round, reactive to light and accomodation, conjunctiva and sclera clear. Extra-ocular movements intact bilaterally.  ENT: No nasal discharge; airway clear. Trachea is midline. ORAL: No oropharyngeal exudates or erythema. Normal dentition.  CV: Regular rate and rhythm. S1, S2 normal; no murmurs, gallops, or rubs. No lower extremity pitting edema bilaterally. Capillary refill < 2 seconds throughout. Distal pulses intact 2+ throughout.  RESP: CTA bilaterally. No wheezes, rales, or rhonchi.   ABD: Normal bowel sounds, soft, non-distended, non-tender, no rigidity, no guarding, no rebound, no hepatosplenomegaly. No CVA tenderness bilaterally.  MSK: Normal range of motion and movement of all 4 extremities. No joint or muscular pain throughout. No clubbing.   BACK: No thoracolumbar midline or paravertebral tenderness. No step-offs or obvious deformities.  NEURO: Alert & oriented x 3, Grossly unremarkable. Sensory and motor intact throughout. No focal deficits. Gait: Fluid. Normal speech and coordination.   PSYCH: Cooperative, appropriate.

## 2021-01-12 NOTE — ED PROVIDER NOTE - PROGRESS NOTE DETAILS
patient in no etoh w/d at the moment. denies any recent ETOH. reporting that he is taking ASA and "pain medication" for his abdominal pain. Re-educated that since he has gastritis, he needs to avoid NSAIDS/ASA/ETOH and take PPIs recommended by GI on his previous admission. patient w/ no vomiting episodes in the ED. Patient abd soft nd nt (+) BS, asking for IV pain medication. Patient wants to leave the hospital. The patient is hemodynamically stable, clinically well-appearing, mentating well, and ambulatory for discharge home. The patient is exhibiting the following behaviors known to be associated with addiction and pseudo-addiction:   XXX Inability to restrict medications or take them on an agreed-upon schedule.  XXX Doctor shopping  XXX Noncompliance with recommended non-opioid treatments or evaluations.  XXX A Pre-occupation with opioids  XXX Insistence on rapid-onset formulations and routes of administrations  XXX Reports of allergy or no relief whatsoever by any non-opioid treatments patient w/ no vomiting episodes in the ED. Patient abd soft nd nt (+) BS, asking for IV pain medication. Patient wants to leave the hospital. The patient is hemodynamically stable, clinically well-appearing, mentating well, and ambulatory for discharge home. The patient is exhibiting the following behaviors known to be associated with addiction and pseudo-addiction: Inability to restrict medications or take them on an agreed-upon schedule, Doctor shopping, Noncompliance with recommended non-opioid treatments or evaluations, A Pre-occupation with opioids, Insistence on rapid-onset formulations and routes of administrations, Reports of allergy or no relief whatsoever by any non-opioid treatments

## 2021-01-12 NOTE — ED PROVIDER NOTE - OBJECTIVE STATEMENT
55M PMHx of alcohol abuse (hx of DTs, ETOH w/d, multiple ED/admits visits), gastritis presenting with nausea/vomiting x 2 days. Patient is a poor historian, but is well known to the ED. Reports that "he was eating normally for 2 days and then vomited everything up today". Otherwise, in the ED patient is not tremulous with steady gait. Denies any fevers, chills, chest pain, shortness of breath, headaches, syncope, dysuria/hematuria, lightheadedness, hematemesis, cough, traumatic injury. Of note was recently admitted for ETOH w/d, hematemesis but had a stable Hb, but did not get a EGD due to covid (+).

## 2021-01-12 NOTE — ED PROVIDER NOTE - CLINICAL SUMMARY MEDICAL DECISION MAKING FREE TEXT BOX
Pt is well known frequent patient to ED, hx of substance abuse, hx of etoh abuse, etoh w/d, DTs, gastritis presenting with typical gastritis and vomiting today. Was last seen in ED on 1/7 for similar complaint. Unremarkable cbc/cmp and dc home. Had an inpatient evaluation on 12/21 w/ normal pan-CT and d/c'd on 12/28th for similar presentation w/ stable hb and no EGD due to (+) covid status.   - currently not in alcohol w/d  - typical gastritis nausea/vomiting today.  - symptomatic care and dc home w/ GI follow up

## 2021-02-10 ENCOUNTER — INPATIENT (INPATIENT)
Facility: HOSPITAL | Age: 56
LOS: 0 days | Discharge: HOME | End: 2021-02-11
Attending: INTERNAL MEDICINE | Admitting: INTERNAL MEDICINE
Payer: MEDICAID

## 2021-02-10 VITALS
DIASTOLIC BLOOD PRESSURE: 99 MMHG | TEMPERATURE: 98 F | HEIGHT: 67 IN | SYSTOLIC BLOOD PRESSURE: 154 MMHG | RESPIRATION RATE: 21 BRPM | WEIGHT: 166.89 LBS | OXYGEN SATURATION: 99 % | HEART RATE: 134 BPM

## 2021-02-10 DIAGNOSIS — E78.5 HYPERLIPIDEMIA, UNSPECIFIED: ICD-10-CM

## 2021-02-10 DIAGNOSIS — K29.21 ALCOHOLIC GASTRITIS WITH BLEEDING: ICD-10-CM

## 2021-02-10 DIAGNOSIS — K27.9 PEPTIC ULCER, SITE UNSPECIFIED, UNSPECIFIED AS ACUTE OR CHRONIC, WITHOUT HEMORRHAGE OR PERFORATION: ICD-10-CM

## 2021-02-10 DIAGNOSIS — K92.0 HEMATEMESIS: ICD-10-CM

## 2021-02-10 DIAGNOSIS — F10.230 ALCOHOL DEPENDENCE WITH WITHDRAWAL, UNCOMPLICATED: ICD-10-CM

## 2021-02-10 LAB
ALBUMIN SERPL ELPH-MCNC: 4.4 G/DL — SIGNIFICANT CHANGE UP (ref 3.3–5)
ALP SERPL-CCNC: 52 U/L — SIGNIFICANT CHANGE UP (ref 40–120)
ALT FLD-CCNC: 41 U/L — SIGNIFICANT CHANGE UP (ref 12–78)
ANION GAP SERPL CALC-SCNC: 24 MMOL/L — HIGH (ref 5–17)
APTT BLD: 26.4 SEC — LOW (ref 27.5–35.5)
AST SERPL-CCNC: 31 U/L — SIGNIFICANT CHANGE UP (ref 15–37)
BASOPHILS # BLD AUTO: 0.05 K/UL — SIGNIFICANT CHANGE UP (ref 0–0.2)
BASOPHILS NFR BLD AUTO: 0.4 % — SIGNIFICANT CHANGE UP (ref 0–2)
BILIRUB SERPL-MCNC: 0.4 MG/DL — SIGNIFICANT CHANGE UP (ref 0.2–1.2)
BUN SERPL-MCNC: 24 MG/DL — HIGH (ref 7–23)
CALCIUM SERPL-MCNC: 10 MG/DL — SIGNIFICANT CHANGE UP (ref 8.5–10.1)
CHLORIDE SERPL-SCNC: 91 MMOL/L — LOW (ref 96–108)
CK MB CFR SERPL CALC: <1 NG/ML — SIGNIFICANT CHANGE UP (ref 0.5–3.6)
CO2 SERPL-SCNC: 22 MMOL/L — SIGNIFICANT CHANGE UP (ref 22–31)
CREAT SERPL-MCNC: 1.78 MG/DL — HIGH (ref 0.5–1.3)
EOSINOPHIL # BLD AUTO: 0 K/UL — SIGNIFICANT CHANGE UP (ref 0–0.5)
EOSINOPHIL NFR BLD AUTO: 0 % — SIGNIFICANT CHANGE UP (ref 0–6)
GLUCOSE SERPL-MCNC: 141 MG/DL — HIGH (ref 70–99)
HCT VFR BLD CALC: 44.8 % — SIGNIFICANT CHANGE UP (ref 39–50)
HGB BLD-MCNC: 14.4 G/DL — SIGNIFICANT CHANGE UP (ref 13–17)
IMM GRANULOCYTES NFR BLD AUTO: 0.3 % — SIGNIFICANT CHANGE UP (ref 0–1.5)
INR BLD: 1 RATIO — SIGNIFICANT CHANGE UP (ref 0.88–1.16)
LACTATE SERPL-SCNC: 11.3 MMOL/L — CRITICAL HIGH (ref 0.7–2)
LACTATE SERPL-SCNC: 2.2 MMOL/L — HIGH (ref 0.7–2)
LIDOCAIN IGE QN: 106 U/L — SIGNIFICANT CHANGE UP (ref 73–393)
LYMPHOCYTES # BLD AUTO: 1.1 K/UL — SIGNIFICANT CHANGE UP (ref 1–3.3)
LYMPHOCYTES # BLD AUTO: 9.4 % — LOW (ref 13–44)
MCHC RBC-ENTMCNC: 25.6 PG — LOW (ref 27–34)
MCHC RBC-ENTMCNC: 32.1 GM/DL — SIGNIFICANT CHANGE UP (ref 32–36)
MCV RBC AUTO: 79.6 FL — LOW (ref 80–100)
MONOCYTES # BLD AUTO: 0.72 K/UL — SIGNIFICANT CHANGE UP (ref 0–0.9)
MONOCYTES NFR BLD AUTO: 6.2 % — SIGNIFICANT CHANGE UP (ref 2–14)
NEUTROPHILS # BLD AUTO: 9.8 K/UL — HIGH (ref 1.8–7.4)
NEUTROPHILS NFR BLD AUTO: 83.7 % — HIGH (ref 43–77)
NRBC # BLD: 0 /100 WBCS — SIGNIFICANT CHANGE UP (ref 0–0)
PLATELET # BLD AUTO: 359 K/UL — SIGNIFICANT CHANGE UP (ref 150–400)
POTASSIUM SERPL-MCNC: 3.7 MMOL/L — SIGNIFICANT CHANGE UP (ref 3.5–5.3)
POTASSIUM SERPL-SCNC: 3.7 MMOL/L — SIGNIFICANT CHANGE UP (ref 3.5–5.3)
PROT SERPL-MCNC: 9.2 GM/DL — HIGH (ref 6–8.3)
PROTHROM AB SERPL-ACNC: 11.6 SEC — SIGNIFICANT CHANGE UP (ref 10.6–13.6)
RAPID RVP RESULT: SIGNIFICANT CHANGE UP
RBC # BLD: 5.63 M/UL — SIGNIFICANT CHANGE UP (ref 4.2–5.8)
RBC # FLD: 16.7 % — HIGH (ref 10.3–14.5)
SARS-COV-2 RNA SPEC QL NAA+PROBE: SIGNIFICANT CHANGE UP
SODIUM SERPL-SCNC: 137 MMOL/L — SIGNIFICANT CHANGE UP (ref 135–145)
TROPONIN I SERPL-MCNC: <.015 NG/ML — SIGNIFICANT CHANGE UP (ref 0.01–0.04)
WBC # BLD: 11.7 K/UL — HIGH (ref 3.8–10.5)
WBC # FLD AUTO: 11.7 K/UL — HIGH (ref 3.8–10.5)

## 2021-02-10 PROCEDURE — 93010 ELECTROCARDIOGRAM REPORT: CPT

## 2021-02-10 PROCEDURE — 99285 EMERGENCY DEPT VISIT HI MDM: CPT

## 2021-02-10 PROCEDURE — 74174 CTA ABD&PLVS W/CONTRAST: CPT | Mod: 26,MA

## 2021-02-10 PROCEDURE — 99222 1ST HOSP IP/OBS MODERATE 55: CPT

## 2021-02-10 RX ORDER — SODIUM CHLORIDE 9 MG/ML
2000 INJECTION INTRAMUSCULAR; INTRAVENOUS; SUBCUTANEOUS ONCE
Refills: 0 | Status: COMPLETED | OUTPATIENT
Start: 2021-02-10 | End: 2021-02-10

## 2021-02-10 RX ORDER — FOLIC ACID 0.8 MG
1 TABLET ORAL DAILY
Refills: 0 | Status: DISCONTINUED | OUTPATIENT
Start: 2021-02-10 | End: 2021-02-11

## 2021-02-10 RX ORDER — ONDANSETRON 8 MG/1
4 TABLET, FILM COATED ORAL EVERY 6 HOURS
Refills: 0 | Status: DISCONTINUED | OUTPATIENT
Start: 2021-02-10 | End: 2021-02-11

## 2021-02-10 RX ORDER — ATORVASTATIN CALCIUM 80 MG/1
20 TABLET, FILM COATED ORAL AT BEDTIME
Refills: 0 | Status: DISCONTINUED | OUTPATIENT
Start: 2021-02-10 | End: 2021-02-11

## 2021-02-10 RX ORDER — SODIUM CHLORIDE 9 MG/ML
1000 INJECTION INTRAMUSCULAR; INTRAVENOUS; SUBCUTANEOUS
Refills: 0 | Status: DISCONTINUED | OUTPATIENT
Start: 2021-02-10 | End: 2021-02-11

## 2021-02-10 RX ORDER — OMEPRAZOLE 10 MG/1
1 CAPSULE, DELAYED RELEASE ORAL
Qty: 0 | Refills: 0 | DISCHARGE

## 2021-02-10 RX ORDER — PANTOPRAZOLE SODIUM 20 MG/1
40 TABLET, DELAYED RELEASE ORAL
Refills: 0 | Status: DISCONTINUED | OUTPATIENT
Start: 2021-02-10 | End: 2021-02-11

## 2021-02-10 RX ORDER — METOCLOPRAMIDE HCL 10 MG
10 TABLET ORAL ONCE
Refills: 0 | Status: COMPLETED | OUTPATIENT
Start: 2021-02-10 | End: 2021-02-10

## 2021-02-10 RX ORDER — SODIUM CHLORIDE 9 MG/ML
2000 INJECTION INTRAMUSCULAR; INTRAVENOUS; SUBCUTANEOUS
Refills: 0 | Status: COMPLETED | OUTPATIENT
Start: 2021-02-10 | End: 2021-02-10

## 2021-02-10 RX ORDER — ASPIRIN/CALCIUM CARB/MAGNESIUM 324 MG
81 TABLET ORAL DAILY
Refills: 0 | Status: DISCONTINUED | OUTPATIENT
Start: 2021-02-10 | End: 2021-02-11

## 2021-02-10 RX ORDER — CALCIUM CARBONATE 500(1250)
1 TABLET ORAL ONCE
Refills: 0 | Status: COMPLETED | OUTPATIENT
Start: 2021-02-10 | End: 2021-02-10

## 2021-02-10 RX ORDER — PANTOPRAZOLE SODIUM 20 MG/1
40 TABLET, DELAYED RELEASE ORAL
Refills: 0 | Status: DISCONTINUED | OUTPATIENT
Start: 2021-02-10 | End: 2021-02-10

## 2021-02-10 RX ORDER — FAMOTIDINE 10 MG/ML
20 INJECTION INTRAVENOUS ONCE
Refills: 0 | Status: COMPLETED | OUTPATIENT
Start: 2021-02-10 | End: 2021-02-10

## 2021-02-10 RX ORDER — THIAMINE MONONITRATE (VIT B1) 100 MG
100 TABLET ORAL DAILY
Refills: 0 | Status: DISCONTINUED | OUTPATIENT
Start: 2021-02-10 | End: 2021-02-11

## 2021-02-10 RX ADMIN — SODIUM CHLORIDE 2000 MILLILITER(S): 9 INJECTION INTRAMUSCULAR; INTRAVENOUS; SUBCUTANEOUS at 06:50

## 2021-02-10 RX ADMIN — Medication 50 MILLIGRAM(S): at 03:18

## 2021-02-10 RX ADMIN — SODIUM CHLORIDE 2000 MILLILITER(S): 9 INJECTION INTRAMUSCULAR; INTRAVENOUS; SUBCUTANEOUS at 02:30

## 2021-02-10 RX ADMIN — PANTOPRAZOLE SODIUM 40 MILLIGRAM(S): 20 TABLET, DELAYED RELEASE ORAL at 18:01

## 2021-02-10 RX ADMIN — Medication 2 MILLIGRAM(S): at 21:55

## 2021-02-10 RX ADMIN — SODIUM CHLORIDE 2000 MILLILITER(S): 9 INJECTION INTRAMUSCULAR; INTRAVENOUS; SUBCUTANEOUS at 04:33

## 2021-02-10 RX ADMIN — FAMOTIDINE 20 MILLIGRAM(S): 10 INJECTION INTRAVENOUS at 02:29

## 2021-02-10 RX ADMIN — ATORVASTATIN CALCIUM 20 MILLIGRAM(S): 80 TABLET, FILM COATED ORAL at 21:55

## 2021-02-10 RX ADMIN — ONDANSETRON 4 MILLIGRAM(S): 8 TABLET, FILM COATED ORAL at 18:12

## 2021-02-10 RX ADMIN — PANTOPRAZOLE SODIUM 40 MILLIGRAM(S): 20 TABLET, DELAYED RELEASE ORAL at 06:46

## 2021-02-10 RX ADMIN — Medication 2 MILLIGRAM(S): at 03:19

## 2021-02-10 RX ADMIN — Medication 2 MILLIGRAM(S): at 23:59

## 2021-02-10 RX ADMIN — SODIUM CHLORIDE 75 MILLILITER(S): 9 INJECTION INTRAMUSCULAR; INTRAVENOUS; SUBCUTANEOUS at 09:39

## 2021-02-10 RX ADMIN — Medication 30 MILLILITER(S): at 18:51

## 2021-02-10 RX ADMIN — Medication 1 TABLET(S): at 11:08

## 2021-02-10 RX ADMIN — Medication 2 MILLIGRAM(S): at 20:54

## 2021-02-10 RX ADMIN — Medication 2 MILLIGRAM(S): at 11:09

## 2021-02-10 RX ADMIN — Medication 2 MILLIGRAM(S): at 15:22

## 2021-02-10 RX ADMIN — Medication 1 MILLIGRAM(S): at 11:08

## 2021-02-10 RX ADMIN — Medication 10 MILLIGRAM(S): at 02:29

## 2021-02-10 RX ADMIN — SODIUM CHLORIDE 125 MILLILITER(S): 9 INJECTION INTRAMUSCULAR; INTRAVENOUS; SUBCUTANEOUS at 06:47

## 2021-02-10 RX ADMIN — Medication 100 MILLIGRAM(S): at 11:08

## 2021-02-10 RX ADMIN — Medication 2 MILLIGRAM(S): at 17:29

## 2021-02-10 RX ADMIN — Medication 1 TABLET(S): at 20:54

## 2021-02-10 RX ADMIN — Medication 81 MILLIGRAM(S): at 11:08

## 2021-02-10 NOTE — ED ADULT NURSE NOTE - TEMPLATE
Internal Medicine Discharge Summary  Note Author: Grady Hahn M.D. / Naty Lilly      Name Rafia Shaikh 1931   Age/Sex 87 y.o. male   MRN [id number]         Admit Date:  3/8/2019       Discharge Date:   3/18/2019    Service:   [facility] Internal Medicine Gray Team  Attending Physician(s):   Dr. Kusum Clemente       Senior Resident(s):   Dr. Lilly  Sae Resident(s):   Dr. Hahn  PCP: Halie Dominique M.D.      Primary Diagnosis:   Ischemic colitits    Secondary Diagnoses:                Active Problems:    Hypokalemia POA: Yes    Cognitive impairment POA: Yes    PVD (peripheral vascular disease) (CMS-HCC) POA: Yes      Overview: Parra- stent-n LLE.    PAF (paroxysmal atrial fibrillation) ([facility]) POA: Yes    CAD (coronary artery disease) of artery bypass graft POA: Yes    Falls POA: Yes    Hypothyroidism POA: Yes    Hypertension POA: Yes  Resolved Problems:    Lower GI bleed POA: Yes      Overview: 3/17- embolization of ileocecal branch of SMA- Smart    Diverticulitis POA: Yes    Sepsis (HCC) POA: Yes    Elevated INR POA: Yes    Altered mental status POA: Yes    Leukocytosis POA: Yes      Hospital Summary (Brief Narrative):       Rafia Shaikh is a 87 y.o. male with past medical history of Afib (previously on Xarelto but recently DC'd due to frequent falls), CAD s/p CABG, LUKE, AS s/p TAVR, HLD, gout, HTN, hypothyroidism, PAD, TIA, MONTRELL (noncompliant with CPAP), GI bleed in 2016 2/2 AVM s/p cauterization presented to the ED following an episode of hematochezia. In the ED, rectal showed BRBPR, Hgb was slightly low and pt was noted to have slight thrombocytopenia and a leukocytosis. CMP showed considerable hypokalemia. UA was not suggestive of infxn. CT head w/o showed no acute pathology. He was 2/4 SIRS (RR and WBC) but hypertensive on arrival. CT a/p showed colitis vs diverticulitis. Patient was evaluated by surgery, who offered surgical intervention which the family declines. Patient was managed  conservatively in collaboration with surgery and gastroenterology. Patient improved with conservative treatment; bleeding resolved and Hb remained stable. Currently the patient is medically cleared for discharge. Both him and his wife would like to return home with home health.    Patient /Hospital Summary (Details -- Problem Oriented) :          Sepsis (HCC)-resolved as of 3/16/2019   Assessment & Plan    Met sepsis criteria on admission: SIRS 2/4 (leukocytosis, tachypnea) + suspected source of infection (diverticulitis).  Go aggressive fluid management and ABT with good response.  Received course of ABT for diverticulitis.  Finish Omnicef and Flagyl 3/18/19.         Diverticulitis-resolved as of 3/16/2019   Assessment & Plan    CT ab / pelv with contrast showed severe colitis from the splenic flexure through the proximal sigmoid, possible diverticulitis, though colitis more severe than expected for diverticulitis.  h/o abdominal pain, diarrhea, nausea, vomiting, fever, etc., per wife, abdominal exam benign - however, given patient altered, SIRS criteria (leukocytosis, tachypnea), CT findings, bleed, will treat for diverticulitis.  Continue ceftriaxone, Flagyl x10 days; changed to oral cefdinir/flagyl end date 3/18/19.     Lower GI bleed-resolved as of 3/16/2019   Assessment & Plan    BRBPR on admission. Had a voluminous passage of blood this AM and has continuous small dripping from rectum.  Hx GI bleeding with embolization of ileocecal branch by IR Dr. Hart (03/21/17), subsequent colonoscopy in May 2017 (follwed by GI consultants - Dr. Smart) had endoclip of AVMs in distal ileum, removal of polyps.  Vitals stable, Hb at baseline, active small volume bleeding per rectum.  Etiology ischemic colitis vs diverticular bleed in setting of diverticulitis with known ASA use and falls, possible AVM, hemorrhoids, less likely upper GI bleed.  -LDH, CPK, amylase wnl  -C diff, crypto/giardia antigen negative  -Surgery,  no surgery at this time after discussing with patient  -GI, no endoscopy considering inflammation unless requested by surg  -Spoke with IR, no role for embolization at this time.  -Transfuse if Hb <7.  -IVF     Cognitive impairment   Assessment & Plan    Progressive decline in cognition per wife, though acutely more confused the past two weeks.  Likely in setting of progressive dementia and current acute presentation.     Hypokalemia   Assessment & Plan    Monitor and replete as appropriate.     Altered mental status-resolved as of 3/16/2019   Assessment & Plan    Has had progressive decline in mental status per his wife.  Evaluated by psych in March 2018 - had no capacity at that time.  Alert and responsive on evaluation today.  CTM.     Elevated INR-resolved as of 3/16/2019   Assessment & Plan    Hold AC in the setting of GIB.  Monitor.     Hypertension   Assessment & Plan    Continue home Lisinopril.  Restart other home medications as per PCP.       Hypothyroidism   Assessment & Plan    Continue home dose of Levothyroxine.     Falls   Assessment & Plan    PT as outpatient.     CAD (coronary artery disease) of artery bypass graft   Assessment & Plan    H/o 3-vessel coronary bypass graft with left internal mammary artery graft to left anterior descending artery, saphenous vein graft to obtuse marginal branch and saphenous vein graft to posterior descending artery by Dr. Coates in March 2018.  No cardiac symptoms on this admission, EKG stable compared to prior.  On home atorvastatin, hold ASA in setting of GI bleed.  CTM.     Leukocytosis-resolved as of 3/16/2019   Assessment & Plan    RESOLVED.  CTM.     PAF (paroxysmal atrial fibrillation) ([facility])   Assessment & Plan    Xarelto discontinued by cards in August 2018.  Continue home metoprolol.     PVD (peripheral vascular disease) (CMS-[facility])   Assessment & Plan    H/o prior left superficial femoral artery stent, known high grade stenosis of right common femoral artery  closed with Starclose closure by Dr. Amaya on 03/22/17.  CTM.         Consultants:     Surgery.  Gastroenterology.    Procedures:        None.    Imaging/ Testing:      IR-MIDLINE CATHETER INSERTION WO GUIDANCE > AGE 3   Final Result                  Ultrasound-guided midline placement performed by qualified nursing staff    as above.          CT-HEAD WITH   Final Result      1.  No acute intracranial findings.      2.  Diffuse atrophy and periventricular white matter change, consistent with chronic small vessel disease.      DX-CHEST-LIMITED (1 VIEW)   Final Result         1.  No acute cardiopulmonary disease.   2.  Atherosclerosis      CT-ABDOMEN-PELVIS WITH   Final Result         1.  Severe colitis from the splenic flexure through the proximal sigmoid. Diverticula are seen in component of diverticulitis cannot be excluded, however degree of involvement would not be expected for diverticulitis alone.   2.  Bilateral nephrolithiasis, mild left hydronephrosis and hydroureter without visualized obstructing stone.   3.  Atherosclerosis and atherosclerotic coronary artery disease.   4.  Low-density hepatic lesions, similar in size and number compared to prior study, appearance favors cyst.      CT-HEAD W/O   Final Result         1.  No acute intracranial abnormality is identified, there are nonspecific white matter changes, commonly associated with small vessel ischemic disease.  Associated mild cerebral atrophy is noted.   2.  Atherosclerosis.        Discharge Medications:         Medication Reconciliation: Completed       Medication List      START taking these medications      Instructions   cefdinir 300 MG Caps  Commonly known as:  OMNICEF   Take 1 Cap by mouth every 12 hours for 5 doses.  Dose:  300 mg     metroNIDAZOLE 500 MG Tabs  Commonly known as:  FLAGYL   Take 1 Tab by mouth every 8 hours for 5 doses.  Dose:  500 mg     potassium chloride SA 20 MEQ Tbcr  Commonly known as:  Kdur   Take 2 Tabs by mouth every  day.  Dose:  40 mEq        CONTINUE taking these medications      Instructions   allopurinol 300 MG Tabs  Commonly known as:  ZYLOPRIM   Take 300 mg by mouth every day.  Dose:  300 mg     aspirin EC 81 MG Tbec  Commonly known as:  ECOTRIN   Take 81 mg by mouth every evening.  Dose:  81 mg     atorvastatin 20 MG Tabs  Commonly known as:  LIPITOR   Take 20 mg by mouth every evening.  Dose:  20 mg     levothyroxine 50 MCG Tabs  Commonly known as:  SYNTHROID   Take 50 mcg by mouth every day.  Dose:  50 mcg     lisinopril 5 MG Tabs  Commonly known as:  PRINIVIL   Take 5 mg by mouth every day.  Dose:  5 mg        STOP taking these medications    amLODIPine 5 MG Tabs  Commonly known as:  NORVASC     metoprolol 25 MG Tabs  Commonly known as:  LOPRESSOR            Disposition:   Home with HH.    Diet:   Regular.    Activity:   As tolerated.    Instructions:      Finish course of antibiotic therapy with omnicef and metronidazole (last does 3/18).  Follow up with PCP.    The patient was instructed to return to the ER in the event of worsening symptoms. I have counseled the patient on the importance of compliance and the patient has agreed to proceed with all medical recommendations and follow up plan indicated above.   The patient understands that all medications come with benefits and risks. Risks may include permanent injury or death and these risks can be minimized with close reassessment and monitoring.        Primary Care Provider:    Halie Dominique M.D.    Discharge summary faxed to primary care provider:  Completed  Copy of discharge summary given to the patient: Completed      Follow up appointment details :      Future Appointments  Date Time Provider Department Center   4/4/2019 3:15 PM Halie Dominique M.D. [facility] None     Halie Dominique M.D.  [address] [address]  [phone]    Schedule an appointment as soon as possible for a visit  As needed      Pending Studies:        None.    Time spent  on discharge day patient visit, preparing discharge paperwork and arranging for patient follow up.    Summary of follow up issues:   Follow up for recurrence of hematochezia.  F/u cognitive impairment.    Discharge Time (Minutes) :    45 mins  Hospital Course Type:  Inpatient Stay >2 midnights      Condition on Discharge    ______________________________________________________________________    Interval history/exam for day of discharge:    -EFRAIN o/n; has no acute complaints, no abd tenderness/pain     -working with SW, pending placement and trying to coordinate with patient's wife's placement as she is also inpatient  -PT/OT rec post acute placement at inpatient transitional care facility  -patient and wife deferring SNF, requesting to go home with home health; arranging     -will try to discharge with home health once can be arranged    Review of Systems   Constitutional: Negative.    HENT: Negative.    Eyes: Negative.    Respiratory: Negative.    Cardiovascular: Negative.    Gastrointestinal: Negative.    Musculoskeletal: Negative.    Skin: Negative.    Neurological: Negative.    Endo/Heme/Allergies: Negative.      Physical Exam   Constitutional: He appears well-developed and well-nourished. No distress.   HENT:   Head: Normocephalic and atraumatic.   Right Ear: External ear normal.   Left Ear: External ear normal.   Mouth/Throat: No oropharyngeal exudate.   Eyes: Pupils are equal, round, and reactive to light. Conjunctivae and EOM are normal. Right eye exhibits no discharge. Left eye exhibits no discharge.   Neck: Normal range of motion. Neck supple. No JVD present.   Cardiovascular: Normal rate, regular rhythm and normal heart sounds.  Exam reveals no gallop and no friction rub.    No murmur heard.  Pulmonary/Chest: Effort normal and breath sounds normal. He has no wheezes. He has no rales.   Abdominal: Soft. Bowel sounds are normal. He exhibits no mass. There is no tenderness. There is no guarding.    Musculoskeletal: Normal range of motion. He exhibits no edema.   Neurological: He is alert.   Skin: Skin is warm and dry. He is not diaphoretic. No erythema.       Most Recent Labs:    Lab Results   Component Value Date/Time    WBC 7.2 03/12/2019 02:15 AM    WBC 7.2 04/05/2013 10:05 AM    RBC 4.62 (L) 03/12/2019 02:15 AM    RBC 3.96 (L) 04/05/2013 10:05 AM    HEMOGLOBIN 14.8 03/12/2019 02:15 AM    HEMATOCRIT 45.2 03/12/2019 02:15 AM    MCV 97.8 03/12/2019 02:15 AM     (H) 04/05/2013 10:05 AM    MCH 32.0 03/12/2019 02:15 AM    MCH 33.3 (H) 04/05/2013 10:05 AM    MCHC 32.7 (L) 03/12/2019 02:15 AM    MPV 10.6 03/12/2019 02:15 AM    NEUTSPOLYS 59.80 03/12/2019 02:15 AM    LYMPHOCYTES 24.10 03/12/2019 02:15 AM    MONOCYTES 8.70 03/12/2019 02:15 AM    EOSINOPHILS 5.30 03/12/2019 02:15 AM    BASOPHILS 1.00 03/12/2019 02:15 AM    ANISOCYTOSIS 1+ 03/01/2018 03:22 AM      Lab Results   Component Value Date/Time    SODIUM 137 03/12/2019 02:15 AM    POTASSIUM 3.7 03/12/2019 02:15 AM    CHLORIDE 104 03/12/2019 02:15 AM    CO2 25 03/12/2019 02:15 AM    GLUCOSE 86 03/12/2019 02:15 AM    BUN 10 03/12/2019 02:15 AM    CREATININE 0.87 03/12/2019 02:15 AM    CREATININE 1.80 (H) 04/05/2013 10:05 AM    BUNCREATRAT 15 09/12/2017 11:47 AM    BUNCREATRAT 16 04/05/2013 10:05 AM      Lab Results   Component Value Date/Time    ALTSGPT 8 03/11/2019 02:17 AM    ASTSGOT 18 03/11/2019 02:17 AM    ALKPHOSPHAT 79 03/11/2019 02:17 AM    TBILIRUBIN 0.9 03/11/2019 02:17 AM    LIPASE 59 02/17/2018 05:15 AM    ALBUMIN 3.7 03/11/2019 02:17 AM    GLOBULIN 2.6 03/11/2019 02:17 AM    PREALBUMIN 20.0 03/09/2019 02:55 AM    INR 1.16 (H) 03/08/2019 05:54 PM    MACROCYTOSIS 1+ 03/01/2018 03:22 AM     Lab Results   Component Value Date/Time    PROTHROMBTM 15.0 (H) 03/08/2019 05:54 PM    INR 1.16 (H) 03/08/2019 05:54 PM           Abdominal Pain, N/V/D

## 2021-02-10 NOTE — H&P ADULT - ASSESSMENT
Patient is a 55M with a PMH of chronic ETOH abuse with hx of alcohol withdrawal with frequent hospital admissions, alcoholic gastritis/esophagitis, PUD, HLD, hx of hypoxic respiratory failure requiring intubation due to aspiration PNA who presents to the ED for abdominal pain. Patient reports acute onset of abd pain with bloody vomiting earlier today.  Vomitus with scant bloody material visualized in the ED.  Patient also tremulous, states he last had a drink yesterday.  No other complaints.  Mild tachycardia in ED, labs show significant lactic acidosis.  Will admit to tele.

## 2021-02-10 NOTE — ED PROVIDER NOTE - CARE PLAN
Principal Discharge DX:	Hematemesis with nausea   Principal Discharge DX:	Hematemesis with nausea  Secondary Diagnosis:	Alcohol withdrawal syndrome without complication

## 2021-02-10 NOTE — H&P ADULT - PROBLEM SELECTOR PLAN 2
CECILIA currently 2, recently received ativan  Will continue withdrawal protocol with Ativan q6 standing  Ativan 2 mg IVP PRN for acute withdrawal symptoms  Thiamine, Folate, MVI daily

## 2021-02-10 NOTE — ED PROVIDER NOTE - OBJECTIVE STATEMENT
56 yo M with hematemesis.  Pt. complains of acute on chronic epigastric pain and vomiting red blood intermixed with clear vomitus.  Pt. admits he has this frequently.  No other complaints/inciting event.    ROS: negative for fever, cough, headache, chest pain, shortness of breath, diarrhea, rash, paresthesia, and weakness--all other systems reviewed are negative.   PMH: alcohol abuse (hx of DTs, ETOH w/d, multiple ED/admits visits), gastritis; Meds: See EMR for list; SH: Denies smoking/drinking/drug use

## 2021-02-10 NOTE — ED PROVIDER NOTE - CLINICAL SUMMARY MEDICAL DECISION MAKING FREE TEXT BOX
56 yo M with vomiting blood, likely 2/2 chronic gastritis, doubt acute brisk GIB, pancreatitis, ischemia  -basic labs, coags, trop, ckmb, lactate, lipase, rvp, CTA ab/pel, keg, iv, monitor, ns hydration bolus, pepcid/reglan  -f/u results, reeval

## 2021-02-10 NOTE — ED PROVIDER NOTE - PROGRESS NOTE DETAILS
pt. reports improvement after meds, CTA shows no acute hemorrhage, pt. no longer vomiting after meds  will admit for etoh wd, treated with ativan and librium with improvement in ER

## 2021-02-10 NOTE — ED ADULT NURSE NOTE - ED STAT RN HAND OFF
Blocked Bed Documentation:    Room number: 592  Type: Behavior  Rationale: Poor Self-Control  Anticipated duration: Will monitor qshift  Additional comments:Patient with history of aggression during previous admissions. Handoff

## 2021-02-10 NOTE — H&P ADULT - NSHPLABSRESULTS_GEN_ALL_CORE
Recent Vitals  T(C): 36.9 (02-10-21 @ 05:05), Max: 36.9 (02-10-21 @ 05:05)  HR: 112 (02-10-21 @ 05:05) (112 - 134)  BP: 139/86 (02-10-21 @ 05:05) (139/86 - 154/99)  RR: 20 (02-10-21 @ 05:05) (20 - 21)  SpO2: 97% (02-10-21 @ 05:05) (97% - 99%)                        14.4   11.70 )-----------( 359      ( 10 Feb 2021 02:16 )             44.8     02-10    137  |  91<L>  |  24<H>  ----------------------------<  141<H>  3.7   |  22  |  1.78<H>    Ca    10.0      10 Feb 2021 03:23    TPro  9.2<H>  /  Alb  4.4  /  TBili  0.4  /  DBili  x   /  AST  31  /  ALT  41  /  AlkPhos  52  02-10    PT/INR - ( 10 Feb 2021 03:23 )   PT: 11.6 sec;   INR: 1.00 ratio         PTT - ( 10 Feb 2021 03:23 )  PTT:26.4 sec  LIVER FUNCTIONS - ( 10 Feb 2021 03:23 )  Alb: 4.4 g/dL / Pro: 9.2 gm/dL / ALK PHOS: 52 U/L / ALT: 41 U/L / AST: 31 U/L / GGT: x               Home Medications:  magnesium oxide 500 mg oral tablet: 1 tab(s) orally once a day (10 Dec 2020 19:32)  omeprazole 40 mg oral delayed release capsule: 1 cap(s) orally once a day (10 Dec 2020 19:32)

## 2021-02-10 NOTE — ED ADULT TRIAGE NOTE - NSWEIGHTCALCTOOLDRUG_GEN_A_CORE
I am placing the link to the hand out from [facility] [place][note 2] that discusses how to treat constipation. It reviews what we talked about today.    [email or web address]      
 used

## 2021-02-10 NOTE — ED CLERICAL - DIVISION
[facility]...
No testicular tenderness, around L. testes, on scrotum, has slight 2 mm induration in the skin, nontender, no erythemat

## 2021-02-10 NOTE — ED ADULT NURSE NOTE - CHIEF COMPLAINT QUOTE
pt c/o abd pain and vomiting blood this evening.  pt has had abd pain since this am.  states his last drink was on sunday at midnight.  +tremors,   +covid x 5 days per patient

## 2021-02-10 NOTE — ED ADULT NURSE NOTE - OBJECTIVE STATEMENT
Pt received vomitting blood. Pain 10/10. Pt reports being positive for covid hi Pt received vomiting blood. Pain 10/10. Pt reports being positive for covid. History of etoh, pt refused to tell me when last drink.

## 2021-02-10 NOTE — ED ADULT NURSE REASSESSMENT NOTE - NS ED NURSE REASSESS COMMENT FT1
Lactate sent, Maintainance fluids hung, protonix given, pt on cardiac monitor, continues to rest comfortably

## 2021-02-10 NOTE — ED PROVIDER NOTE - PHYSICAL EXAMINATION
Vitals: HTN at 154/99, tachy at 134, otherwise WNL  Gen: AAOx3, NAD, sitting uncomfortably in stretcher, non-toxic, tremulous   Head: ncat, perrla, eomi b/l  Neck: supple, no lymphadenopathy, no midline deviation  Heart: rrr, no m/r/g  Lungs: CTA b/l, no rales/ronchi/wheezes  Abd: soft, tender in epigastrium, non-distended, no rebound or guarding  Ext: no clubbing/cyanosis/edema  Neuro: sensation and muscle strength intact b/l

## 2021-02-10 NOTE — ED ADULT TRIAGE NOTE - CHIEF COMPLAINT QUOTE
pt c/o abd pain and vomiting blood this evening.  pt has had abd pain since this am.  states his last drink was on sunday at midnight. pt c/o abd pain and vomiting blood this evening.  pt has had abd pain since this am.  states his last drink was on sunday at midnight.  +tremors,   +covid x 5 days per patient

## 2021-02-11 ENCOUNTER — TRANSCRIPTION ENCOUNTER (OUTPATIENT)
Age: 56
End: 2021-02-11

## 2021-02-11 VITALS
OXYGEN SATURATION: 99 % | DIASTOLIC BLOOD PRESSURE: 83 MMHG | RESPIRATION RATE: 17 BRPM | SYSTOLIC BLOOD PRESSURE: 148 MMHG | TEMPERATURE: 98 F | HEART RATE: 80 BPM

## 2021-02-11 LAB
ANION GAP SERPL CALC-SCNC: 6 MMOL/L — SIGNIFICANT CHANGE UP (ref 5–17)
BUN SERPL-MCNC: 12 MG/DL — SIGNIFICANT CHANGE UP (ref 7–23)
CALCIUM SERPL-MCNC: 8.4 MG/DL — LOW (ref 8.5–10.1)
CHLORIDE SERPL-SCNC: 109 MMOL/L — HIGH (ref 96–108)
CO2 SERPL-SCNC: 24 MMOL/L — SIGNIFICANT CHANGE UP (ref 22–31)
CREAT SERPL-MCNC: 1.07 MG/DL — SIGNIFICANT CHANGE UP (ref 0.5–1.3)
GLUCOSE SERPL-MCNC: 146 MG/DL — HIGH (ref 70–99)
POTASSIUM SERPL-MCNC: 3.6 MMOL/L — SIGNIFICANT CHANGE UP (ref 3.5–5.3)
POTASSIUM SERPL-SCNC: 3.6 MMOL/L — SIGNIFICANT CHANGE UP (ref 3.5–5.3)
SARS-COV-2 IGG SERPL QL IA: POSITIVE
SARS-COV-2 IGM SERPL IA-ACNC: 6.26 INDEX — HIGH
SODIUM SERPL-SCNC: 139 MMOL/L — SIGNIFICANT CHANGE UP (ref 135–145)

## 2021-02-11 PROCEDURE — 99239 HOSP IP/OBS DSCHRG MGMT >30: CPT

## 2021-02-11 RX ORDER — PANTOPRAZOLE SODIUM 20 MG/1
40 TABLET, DELAYED RELEASE ORAL
Refills: 0 | Status: DISCONTINUED | OUTPATIENT
Start: 2021-02-11 | End: 2021-02-11

## 2021-02-11 RX ADMIN — Medication 2 MILLIGRAM(S): at 01:48

## 2021-02-11 RX ADMIN — Medication 2 MILLIGRAM(S): at 09:34

## 2021-02-11 RX ADMIN — Medication 81 MILLIGRAM(S): at 12:14

## 2021-02-11 RX ADMIN — Medication 1 MILLIGRAM(S): at 12:14

## 2021-02-11 RX ADMIN — Medication 100 MILLIGRAM(S): at 12:14

## 2021-02-11 RX ADMIN — Medication 1.5 MILLIGRAM(S): at 13:54

## 2021-02-11 RX ADMIN — Medication 2 MILLIGRAM(S): at 06:08

## 2021-02-11 RX ADMIN — Medication 1 TABLET(S): at 12:14

## 2021-02-11 RX ADMIN — Medication 1.5 MILLIGRAM(S): at 18:01

## 2021-02-11 RX ADMIN — PANTOPRAZOLE SODIUM 40 MILLIGRAM(S): 20 TABLET, DELAYED RELEASE ORAL at 06:08

## 2021-02-11 NOTE — PROGRESS NOTE ADULT - ASSESSMENT
Assessment and plan    nasuea/vomtiing abdominal pain  likely due to gastritis  no obvious hematemesis  no vomiting since admission  CT abdomen pelvis no acute finding  symptoms better  advance diet  c/w PPI, change to PO daily  Hb stable  symptomatic treatment     ROSA  likely pre renal   c/w IVF  check BMP today  nephro following    alcohol abuse/ withdrawal  On CIWA  no active sign of withdrawal currently  folic acid, thiamine   abstinence advised     HLD  on aspirin and statin      DVT ppx: SCD

## 2021-02-11 NOTE — PROGRESS NOTE ADULT - SUBJECTIVE AND OBJECTIVE BOX
Patient is a 55y old  Male who presents with a chief complaint of abd pain, vomiting (11 Feb 2021 12:56)      INTERVAL HPI/OVERNIGHT EVENTS: nausea, vomiting improved. no sign of withdrawal currently noticed.     MEDICATIONS  (STANDING):  aspirin enteric coated 81 milliGRAM(s) Oral daily  atorvastatin 20 milliGRAM(s) Oral at bedtime  folic acid 1 milliGRAM(s) Oral daily  LORazepam     Tablet   Oral   LORazepam     Tablet 1.5 milliGRAM(s) Oral every 4 hours  multivitamin 1 Tablet(s) Oral daily  pantoprazole  Injectable 40 milliGRAM(s) IV Push two times a day  sodium chloride 0.9%. 1000 milliLiter(s) (75 mL/Hr) IV Continuous <Continuous>  thiamine 100 milliGRAM(s) Oral daily    MEDICATIONS  (PRN):  aluminum hydroxide/magnesium hydroxide/simethicone Suspension 30 milliLiter(s) Oral every 8 hours PRN Dyspepsia  LORazepam   Injectable 2 milliGRAM(s) IV Push every 1 hour PRN CIWA-Ar score 8 or greater  ondansetron Injectable 4 milliGRAM(s) IV Push every 6 hours PRN Nausea and/or Vomiting      Allergies    No Known Allergies    Intolerances        REVIEW OF SYSTEMS:  CONSTITUTIONAL: No fever, weight loss, or fatigue  EYES: No eye pain, visual disturbances, or discharge  ENMT:  No difficulty hearing, tinnitus, vertigo; No sinus or throat pain  NECK: No pain or stiffness  BREASTS: No pain, masses, or nipple discharge  RESPIRATORY: No cough, wheezing, chills or hemoptysis; No shortness of breath  CARDIOVASCULAR: No chest pain, palpitations, dizziness, or leg swelling  GASTROINTESTINAL: No abdominal or epigastric pain. No nausea, vomiting, or hematemesis; No diarrhea or constipation. No melena or hematochezia.  GENITOURINARY: No dysuria, frequency, hematuria, or incontinence  NEUROLOGICAL: No headaches, memory loss, loss of strength, numbness, or tremors  SKIN: No itching, burning, rashes, or lesions   LYMPH NODES: No enlarged glands  ENDOCRINE: No heat or cold intolerance; No hair loss  MUSCULOSKELETAL: No joint pain or swelling; No muscle, back, or extremity pain  PSYCHIATRIC: No depression, anxiety, mood swings, or difficulty sleeping  HEME/LYMPH: No easy bruising, or bleeding gums  ALLERGY AND IMMUNOLOGIC: No hives or eczema    Vital Signs Last 24 Hrs  T(C): 36.8 (11 Feb 2021 10:45), Max: 37 (10 Feb 2021 16:33)  T(F): 98.3 (11 Feb 2021 10:45), Max: 98.6 (10 Feb 2021 16:33)  HR: 72 (11 Feb 2021 10:45) (63 - 98)  BP: 113/69 (11 Feb 2021 10:45) (113/69 - 138/85)  BP(mean): --  RR: 18 (11 Feb 2021 10:45) (18 - 18)  SpO2: 98% (11 Feb 2021 10:45) (96% - 99%)    PHYSICAL EXAM:  GENERAL: NAD, well-groomed, well-developed  HEAD:  Atraumatic, Normocephalic  EYES: EOMI, PERRLA, conjunctiva and sclera clear  ENMT: No tonsillar erythema, exudates, or enlargement; Moist mucous membranes, Good dentition, No lesions  NECK: Supple, No JVD, Normal thyroid  NERVOUS SYSTEM:  Alert & Oriented X3, Good concentration; Motor Strength 5/5 B/L upper and lower extremities; DTRs 2+ intact and symmetric  CHEST/LUNG: Clear to percussion bilaterally; No rales, rhonchi, wheezing, or rubs  HEART: Regular rate and rhythm; No murmurs, rubs, or gallops  ABDOMEN: Soft, Nontender, Nondistended; Bowel sounds present  EXTREMITIES:  2+ Peripheral Pulses, No clubbing, cyanosis, or edema  LYMPH: No lymphadenopathy noted  SKIN: No rashes or lesions    LABS:                        14.4   11.70 )-----------( 359      ( 10 Feb 2021 02:16 )             44.8     02-10    137  |  91<L>  |  24<H>  ----------------------------<  141<H>  3.7   |  22  |  1.78<H>    Ca    10.0      10 Feb 2021 03:23    TPro  9.2<H>  /  Alb  4.4  /  TBili  0.4  /  DBili  x   /  AST  31  /  ALT  41  /  AlkPhos  52  02-10    PT/INR - ( 10 Feb 2021 03:23 )   PT: 11.6 sec;   INR: 1.00 ratio         PTT - ( 10 Feb 2021 03:23 )  PTT:26.4 sec    CAPILLARY BLOOD GLUCOSE          RADIOLOGY & ADDITIONAL TESTS:    Imaging Personally Reviewed:  [ ] YES  [ ] NO    Consultant(s) Notes Reviewed:  [ ] YES  [ ] NO    Care Discussed with Consultants/Other Providers [ ] YES  [ ] NO

## 2021-02-11 NOTE — DISCHARGE NOTE NURSING/CASE MANAGEMENT/SOCIAL WORK - PATIENT PORTAL LINK FT
You can access the FollowMyHealth Patient Portal offered by [facility] by registering at the following website: [email or web address]. By joining [facility]’s FollowMyHealth portal, you will also be able to view your health information using other applications (apps) compatible with our system.

## 2021-02-11 NOTE — DISCHARGE NOTE PROVIDER - NSDCCPCAREPLAN_GEN_ALL_CORE_FT
PRINCIPAL DISCHARGE DIAGNOSIS  Diagnosis: Hematemesis with nausea  Assessment and Plan of Treatment:       SECONDARY DISCHARGE DIAGNOSES  Diagnosis: Alcohol withdrawal syndrome without complication  Assessment and Plan of Treatment:

## 2021-02-11 NOTE — CONSULT NOTE ADULT - SUBJECTIVE AND OBJECTIVE BOX
HPI:  Patient is a 55y old  Male who presented to ED last night with abd pain, vomiting, hematemesis. Found to be in ROSA. Hence renal evaluation requested.  Long standing hx of alcohol abuse, alcoholic gastritis, PUD.   Initial BW with Cr 1.78, lactate 11.3.   No hypotension recorded since presentation to ED.   He is awake, agitated while providing limited hx due to less than fluent English.           PAST MEDICAL & SURGICAL HISTORY:  DTs (delirium tremens)    Substance abuse    Gastritis    EtOH dependence    Mixed hyperlipidemia    PUD (peptic ulcer disease)    No significant past surgical history        Social Hx: alcohol abuse        Allergies    No Known Allergies            MEDICATIONS  (STANDING):  aspirin enteric coated 81 milliGRAM(s) Oral daily  atorvastatin 20 milliGRAM(s) Oral at bedtime  folic acid 1 milliGRAM(s) Oral daily  LORazepam     Tablet   Oral   LORazepam     Tablet 1.5 milliGRAM(s) Oral every 4 hours  multivitamin 1 Tablet(s) Oral daily  pantoprazole  Injectable 40 milliGRAM(s) IV Push two times a day  sodium chloride 0.9%. 1000 milliLiter(s) (75 mL/Hr) IV Continuous <Continuous>  thiamine 100 milliGRAM(s) Oral daily    MEDICATIONS  (PRN):  aluminum hydroxide/magnesium hydroxide/simethicone Suspension 30 milliLiter(s) Oral every 8 hours PRN Dyspepsia  LORazepam   Injectable 2 milliGRAM(s) IV Push every 1 hour PRN CIWA-Ar score 8 or greater  ondansetron Injectable 4 milliGRAM(s) IV Push every 6 hours PRN Nausea and/or Vomiting      Daily     Daily Weight in k.1 (2021 04:23)    Drug Dosing Weight  Height (cm): 170.2 (10 Feb 2021 00:59)  Weight (kg): 75.7 (10 Feb 2021 00:59)  BMI (kg/m2): 26.1 (10 Feb 2021 00:59)  BSA (m2): 1.87 (10 Feb 2021 00:59)      REVIEW OF SYSTEMS:    Per HPI            I&O's Detail    10 Feb 2021 07:01  -  2021 07:00  --------------------------------------------------------  IN:  Total IN: 0 mL    OUT:    Voided (mL): 500 mL  Total OUT: 500 mL    Total NET: -500 mL          02-10 @ 07:01  -   @ 07:00  --------------------------------------------------------  IN: 0 mL / OUT: 500 mL / NET: -500 mL        PHYSICAL EXAM:    GENERAL: NAD  HEAD:  Atraumatic, normocephalic  EYES: EOMI, PERRLA. Conjunctiva and sclera clear  ENMT: moist mucous membranes.   CHEST/LUNG: Clear to auscultation bilaterally  HEART: Regular rate and rhythm. No murmurs, rubs, or gallops  ABDOMEN: Soft, Nontender, Nondistended. POS BS  EXTREMITIES:  no edema      LABS:  CBC Full  -  ( 10 Feb 2021 02:16 )  WBC Count : 11.70 K/uL  RBC Count : 5.63 M/uL  Hemoglobin : 14.4 g/dL  Hematocrit : 44.8 %  Platelet Count - Automated : 359 K/uL  Mean Cell Volume : 79.6 fl  Mean Cell Hemoglobin : 25.6 pg  Mean Cell Hemoglobin Concentration : 32.1 gm/dL  Auto Neutrophil # : 9.80 K/uL  Auto Lymphocyte # : 1.10 K/uL  Auto Monocyte # : 0.72 K/uL  Auto Eosinophil # : 0.00 K/uL  Auto Basophil # : 0.05 K/uL  Auto Neutrophil % : 83.7 %  Auto Lymphocyte % : 9.4 %  Auto Monocyte % : 6.2 %  Auto Eosinophil % : 0.0 %  Auto Basophil % : 0.4 %    02-10    137  |  91<L>  |  24<H>  ----------------------------<  141<H>  3.7   |  22  |  1.78<H>    Ca    10.0      10 Feb 2021 03:23    TPro  9.2<H>  /  Alb  4.4  /  TBili  0.4  /  DBili  x   /  AST  31  /  ALT  41  /  AlkPhos  52  02-10    CAPILLARY BLOOD GLUCOSE        PT/INR - ( 10 Feb 2021 03:23 )   PT: 11.6 sec;   INR: 1.00 ratio         PTT - ( 10 Feb 2021 03:23 )  PTT:26.4 sec    CARDIAC MARKERS ( 10 Feb 2021 03:23 )  <.015 ng/mL / x     / x     / x     / <1.0 ng/mL          Impression:  * ROSA -- pre-renal vs ischemic ATN ( elevated lactate on presentation )  * Alcohol intoxication  * Hematemesis    Recommendations:   * Follow response to IVFs with daily BMP  * UA, FeNa  * If Cr fails to improve next 48h, obtain Renal imaging.     Thank you!

## 2021-02-11 NOTE — DISCHARGE NOTE PROVIDER - HOSPITAL COURSE
Patient is a 55y old  Male who presents with a chief complaint of abd pain, vomiting.   INTERVAL HPI/OVERNIGHT EVENTS: nausea, vomiting improved. no sign of withdrawal currently noticed.     Assessment and plan    nasuea/vomtiing abdominal pain  likely due to gastritis  no obvious hematemesis  no vomiting since admission  CT abdomen pelvis no acute finding  symptoms better  tolerating   c/w PPI  Hb stable  symptomatic treatment     ROSA  s/p IVF  improved    alcohol abuse/ withdrawal  On CIWA  no active sign of withdrawal currently  folic acid, thiamine   abstinence advised     HLD  on aspirin and statin Patient is a 55y old  Male who presents with a chief complaint of abd pain, vomiting.   INTERVAL HPI/OVERNIGHT EVENTS: nausea, vomiting improved. no sign of withdrawal currently noticed.     Assessment and plan    nasuea/vomtiing abdominal pain  likely due to gastritis  no obvious hematemesis  no vomiting since admission  CT abdomen pelvis no acute finding  symptoms better  tolerating regular diet  c/w PPI  Hb stable  symptomatic treatment     ROSA  s/p IVF  improved    alcohol abuse/ withdrawal  monitored on CIWA  no active sign of withdrawal currently  folic acid, thiamine   abstinence advised     HLD  on aspirin and statin     pt feels better  and wants to go home. stable for discharge

## 2021-02-11 NOTE — DISCHARGE NOTE PROVIDER - NSDCMRMEDTOKEN_GEN_ALL_CORE_FT
aluminum hydroxide-magnesium hydroxide 200 mg-200 mg/5 mL oral suspension: 30 milliliter(s) orally every 8 hours, As needed, Dyspepsia  Aspirin Low Dose 81 mg oral delayed release tablet: 1 tab(s) orally once a day   atorvastatin 20 mg oral tablet: 1 tab(s) orally once a day (at bedtime)  folic acid 1 mg oral tablet: 1 tab(s) orally once a day  magnesium oxide 500 mg oral tablet: 1 tab(s) orally once a day  Multiple Vitamins oral tablet: 1 tab(s) orally once a day  Protonix 40 mg oral delayed release tablet: 1 tab(s) orally once a day  thiamine 100 mg oral tablet: 1 tab(s) orally once a day  Vitamin D2 50,000 intl units (1.25 mg) oral capsule: 1 cap(s) orally once a week

## 2021-02-17 DIAGNOSIS — I10 ESSENTIAL (PRIMARY) HYPERTENSION: ICD-10-CM

## 2021-02-17 DIAGNOSIS — Z86.16 PERSONAL HISTORY OF COVID-19: ICD-10-CM

## 2021-02-17 DIAGNOSIS — R76.0 RAISED ANTIBODY TITER: ICD-10-CM

## 2021-02-17 DIAGNOSIS — K27.9 PEPTIC ULCER, SITE UNSPECIFIED, UNSPECIFIED AS ACUTE OR CHRONIC, WITHOUT HEMORRHAGE OR PERFORATION: ICD-10-CM

## 2021-02-17 DIAGNOSIS — E87.2 ACIDOSIS: ICD-10-CM

## 2021-02-17 DIAGNOSIS — N17.9 ACUTE KIDNEY FAILURE, UNSPECIFIED: ICD-10-CM

## 2021-02-17 DIAGNOSIS — K29.21 ALCOHOLIC GASTRITIS WITH BLEEDING: ICD-10-CM

## 2021-02-17 DIAGNOSIS — K92.0 HEMATEMESIS: ICD-10-CM

## 2021-02-17 DIAGNOSIS — F10.239 ALCOHOL DEPENDENCE WITH WITHDRAWAL, UNSPECIFIED: ICD-10-CM

## 2021-02-23 NOTE — H&P ADULT - NSHPPOAURINARYCATHETER_GEN_ALL_CORE
no
Products Recommended: Heliocare tablets
General Sunscreen Counseling: I recommended a broad spectrum sunscreen with a SPF of 30 or higher.  I explained that SPF 30 sunscreens block approximately 97 percent of the sun's harmful rays.  Sunscreens should be applied at least 15 minutes prior to expected sun exposure and then every 2 hours after that as long as sun exposure continues. If swimming or exercising sunscreen should be reapplied every 45 minutes to an hour after getting wet or sweating.  One ounce, or the equivalent of a shot glass full of sunscreen, is adequate to protect the skin not covered by a bathing suit. I also recommended a lip balm with a sunscreen as well. Sun protective clothing can be used in lieu of sunscreen but must be worn the entire time you are exposed to the sun's rays.
Detail Level: Detailed

## 2021-03-01 NOTE — ED ADULT NURSE NOTE - EXTENSIONS OF SELF_ADULT
Punch Size In Mm: 3 Epidermal Sutures: 3-0 Ethilon Render In Bullet Format When Appropriate: No Biopsy Type: H and E Anesthesia Volume In Cc (Will Not Render If 0): 0.5 Wound Care: Petrolatum Information: Selecting Yes will display possible errors in your note based on the variables you have selected. This validation is only offered as a suggestion for you. PLEASE NOTE THAT THE VALIDATION TEXT WILL BE REMOVED WHEN YOU FINALIZE YOUR NOTE. IF YOU WANT TO FAX A PRELIMINARY NOTE YOU WILL NEED TO TOGGLE THIS TO 'NO' IF YOU DO NOT WANT IT IN YOUR FAXED NOTE. Billing Type: Third-Party Bill Was A Bandage Applied: Yes Size Of Lesion In Cm (Optional): 0 Anesthesia Type: 1% lidocaine with epinephrine Detail Level: Detailed Dressing: bandage Hemostasis: None Suture Removal: 14 days None

## 2021-03-03 ENCOUNTER — INPATIENT (INPATIENT)
Facility: HOSPITAL | Age: 56
LOS: 20 days | Discharge: HOME | End: 2021-03-24
Attending: INTERNAL MEDICINE | Admitting: INTERNAL MEDICINE
Payer: MEDICAID

## 2021-03-03 VITALS
OXYGEN SATURATION: 98 % | TEMPERATURE: 99 F | HEART RATE: 143 BPM | RESPIRATION RATE: 19 BRPM | WEIGHT: 149.91 LBS | HEIGHT: 67 IN | SYSTOLIC BLOOD PRESSURE: 135 MMHG | DIASTOLIC BLOOD PRESSURE: 98 MMHG

## 2021-03-03 LAB
ALBUMIN SERPL ELPH-MCNC: 4.5 G/DL — SIGNIFICANT CHANGE UP (ref 3.3–5)
ALP SERPL-CCNC: 62 U/L — SIGNIFICANT CHANGE UP (ref 40–120)
ALT FLD-CCNC: 35 U/L — SIGNIFICANT CHANGE UP (ref 12–78)
ANION GAP SERPL CALC-SCNC: 28 MMOL/L — HIGH (ref 5–17)
AST SERPL-CCNC: 31 U/L — SIGNIFICANT CHANGE UP (ref 15–37)
BASOPHILS # BLD AUTO: 0.03 K/UL — SIGNIFICANT CHANGE UP (ref 0–0.2)
BASOPHILS NFR BLD AUTO: 0.4 % — SIGNIFICANT CHANGE UP (ref 0–2)
BILIRUB SERPL-MCNC: 0.3 MG/DL — SIGNIFICANT CHANGE UP (ref 0.2–1.2)
BLD GP AB SCN SERPL QL: SIGNIFICANT CHANGE UP
BUN SERPL-MCNC: 22 MG/DL — SIGNIFICANT CHANGE UP (ref 7–23)
CALCIUM SERPL-MCNC: 9.5 MG/DL — SIGNIFICANT CHANGE UP (ref 8.5–10.1)
CHLORIDE SERPL-SCNC: 105 MMOL/L — SIGNIFICANT CHANGE UP (ref 96–108)
CK SERPL-CCNC: 239 U/L — SIGNIFICANT CHANGE UP (ref 26–308)
CO2 SERPL-SCNC: 12 MMOL/L — LOW (ref 22–31)
CREAT SERPL-MCNC: 2 MG/DL — HIGH (ref 0.5–1.3)
EOSINOPHIL # BLD AUTO: 0 K/UL — SIGNIFICANT CHANGE UP (ref 0–0.5)
EOSINOPHIL NFR BLD AUTO: 0 % — SIGNIFICANT CHANGE UP (ref 0–6)
GLUCOSE BLDC GLUCOMTR-MCNC: 160 MG/DL — HIGH (ref 70–99)
GLUCOSE SERPL-MCNC: 149 MG/DL — HIGH (ref 70–99)
HCT VFR BLD CALC: 47.3 % — SIGNIFICANT CHANGE UP (ref 39–50)
HGB BLD-MCNC: 14.8 G/DL — SIGNIFICANT CHANGE UP (ref 13–17)
IMM GRANULOCYTES NFR BLD AUTO: 0.4 % — SIGNIFICANT CHANGE UP (ref 0–1.5)
INR BLD: 1.01 RATIO — SIGNIFICANT CHANGE UP (ref 0.88–1.16)
LACTATE SERPL-SCNC: 15.5 MMOL/L — CRITICAL HIGH (ref 0.7–2)
LACTATE SERPL-SCNC: 7.2 MMOL/L — CRITICAL HIGH (ref 0.7–2)
LIDOCAIN IGE QN: 142 U/L — SIGNIFICANT CHANGE UP (ref 73–393)
LYMPHOCYTES # BLD AUTO: 0.94 K/UL — LOW (ref 1–3.3)
LYMPHOCYTES # BLD AUTO: 12.1 % — LOW (ref 13–44)
MCHC RBC-ENTMCNC: 25.1 PG — LOW (ref 27–34)
MCHC RBC-ENTMCNC: 31.3 GM/DL — LOW (ref 32–36)
MCV RBC AUTO: 80.3 FL — SIGNIFICANT CHANGE UP (ref 80–100)
MONOCYTES # BLD AUTO: 0.5 K/UL — SIGNIFICANT CHANGE UP (ref 0–0.9)
MONOCYTES NFR BLD AUTO: 6.4 % — SIGNIFICANT CHANGE UP (ref 2–14)
NEUTROPHILS # BLD AUTO: 6.3 K/UL — SIGNIFICANT CHANGE UP (ref 1.8–7.4)
NEUTROPHILS NFR BLD AUTO: 80.7 % — HIGH (ref 43–77)
NRBC # BLD: 0 /100 WBCS — SIGNIFICANT CHANGE UP (ref 0–0)
PLATELET # BLD AUTO: 386 K/UL — SIGNIFICANT CHANGE UP (ref 150–400)
POTASSIUM SERPL-MCNC: 4.4 MMOL/L — SIGNIFICANT CHANGE UP (ref 3.5–5.3)
POTASSIUM SERPL-SCNC: 4.4 MMOL/L — SIGNIFICANT CHANGE UP (ref 3.5–5.3)
PROT SERPL-MCNC: 9.6 GM/DL — HIGH (ref 6–8.3)
PROTHROM AB SERPL-ACNC: 11.7 SEC — SIGNIFICANT CHANGE UP (ref 10.6–13.6)
RAPID RVP RESULT: SIGNIFICANT CHANGE UP
RBC # BLD: 5.89 M/UL — HIGH (ref 4.2–5.8)
RBC # FLD: 17.1 % — HIGH (ref 10.3–14.5)
SARS-COV-2 RNA SPEC QL NAA+PROBE: SIGNIFICANT CHANGE UP
SODIUM SERPL-SCNC: 145 MMOL/L — SIGNIFICANT CHANGE UP (ref 135–145)
TROPONIN I SERPL-MCNC: 0.03 NG/ML — SIGNIFICANT CHANGE UP (ref 0.01–0.04)
WBC # BLD: 7.8 K/UL — SIGNIFICANT CHANGE UP (ref 3.8–10.5)
WBC # FLD AUTO: 7.8 K/UL — SIGNIFICANT CHANGE UP (ref 3.8–10.5)

## 2021-03-03 PROCEDURE — 93010 ELECTROCARDIOGRAM REPORT: CPT

## 2021-03-03 PROCEDURE — 71045 X-RAY EXAM CHEST 1 VIEW: CPT | Mod: 26

## 2021-03-03 PROCEDURE — 99223 1ST HOSP IP/OBS HIGH 75: CPT

## 2021-03-03 PROCEDURE — 99285 EMERGENCY DEPT VISIT HI MDM: CPT

## 2021-03-03 RX ORDER — SODIUM CHLORIDE 9 MG/ML
2000 INJECTION INTRAMUSCULAR; INTRAVENOUS; SUBCUTANEOUS ONCE
Refills: 0 | Status: COMPLETED | OUTPATIENT
Start: 2021-03-03 | End: 2021-03-03

## 2021-03-03 RX ORDER — ONDANSETRON 8 MG/1
4 TABLET, FILM COATED ORAL ONCE
Refills: 0 | Status: COMPLETED | OUTPATIENT
Start: 2021-03-03 | End: 2021-03-03

## 2021-03-03 RX ORDER — FOLIC ACID 0.8 MG
1 TABLET ORAL DAILY
Refills: 0 | Status: DISCONTINUED | OUTPATIENT
Start: 2021-03-03 | End: 2021-03-10

## 2021-03-03 RX ORDER — FAMOTIDINE 10 MG/ML
20 INJECTION INTRAVENOUS ONCE
Refills: 0 | Status: COMPLETED | OUTPATIENT
Start: 2021-03-03 | End: 2021-03-03

## 2021-03-03 RX ORDER — THIAMINE MONONITRATE (VIT B1) 100 MG
100 TABLET ORAL DAILY
Refills: 0 | Status: DISCONTINUED | OUTPATIENT
Start: 2021-03-03 | End: 2021-03-03

## 2021-03-03 RX ORDER — METOPROLOL TARTRATE 50 MG
25 TABLET ORAL
Refills: 0 | Status: DISCONTINUED | OUTPATIENT
Start: 2021-03-03 | End: 2021-03-04

## 2021-03-03 RX ORDER — ATORVASTATIN CALCIUM 80 MG/1
20 TABLET, FILM COATED ORAL AT BEDTIME
Refills: 0 | Status: DISCONTINUED | OUTPATIENT
Start: 2021-03-03 | End: 2021-03-24

## 2021-03-03 RX ORDER — SODIUM CHLORIDE 9 MG/ML
1000 INJECTION INTRAMUSCULAR; INTRAVENOUS; SUBCUTANEOUS ONCE
Refills: 0 | Status: COMPLETED | OUTPATIENT
Start: 2021-03-03 | End: 2021-03-03

## 2021-03-03 RX ORDER — SODIUM CHLORIDE 9 MG/ML
1000 INJECTION INTRAMUSCULAR; INTRAVENOUS; SUBCUTANEOUS
Refills: 0 | Status: DISCONTINUED | OUTPATIENT
Start: 2021-03-03 | End: 2021-03-04

## 2021-03-03 RX ORDER — THIAMINE MONONITRATE (VIT B1) 100 MG
100 TABLET ORAL DAILY
Refills: 0 | Status: DISCONTINUED | OUTPATIENT
Start: 2021-03-03 | End: 2021-03-04

## 2021-03-03 RX ORDER — MORPHINE SULFATE 50 MG/1
4 CAPSULE, EXTENDED RELEASE ORAL ONCE
Refills: 0 | Status: DISCONTINUED | OUTPATIENT
Start: 2021-03-03 | End: 2021-03-03

## 2021-03-03 RX ORDER — PANTOPRAZOLE SODIUM 20 MG/1
40 TABLET, DELAYED RELEASE ORAL
Refills: 0 | Status: DISCONTINUED | OUTPATIENT
Start: 2021-03-03 | End: 2021-03-10

## 2021-03-03 RX ORDER — ONDANSETRON 8 MG/1
4 TABLET, FILM COATED ORAL EVERY 6 HOURS
Refills: 0 | Status: DISCONTINUED | OUTPATIENT
Start: 2021-03-03 | End: 2021-03-24

## 2021-03-03 RX ADMIN — Medication 2 MILLIGRAM(S): at 19:21

## 2021-03-03 RX ADMIN — Medication 2 MILLIGRAM(S): at 20:21

## 2021-03-03 RX ADMIN — PANTOPRAZOLE SODIUM 40 MILLIGRAM(S): 20 TABLET, DELAYED RELEASE ORAL at 18:03

## 2021-03-03 RX ADMIN — FAMOTIDINE 20 MILLIGRAM(S): 10 INJECTION INTRAVENOUS at 11:03

## 2021-03-03 RX ADMIN — SODIUM CHLORIDE 1000 MILLILITER(S): 9 INJECTION INTRAMUSCULAR; INTRAVENOUS; SUBCUTANEOUS at 10:46

## 2021-03-03 RX ADMIN — Medication 1 MILLIGRAM(S): at 16:04

## 2021-03-03 RX ADMIN — SODIUM CHLORIDE 1000 MILLILITER(S): 9 INJECTION INTRAMUSCULAR; INTRAVENOUS; SUBCUTANEOUS at 11:55

## 2021-03-03 RX ADMIN — Medication 100 MILLIGRAM(S): at 22:35

## 2021-03-03 RX ADMIN — Medication 1 MILLIGRAM(S): at 10:44

## 2021-03-03 RX ADMIN — SODIUM CHLORIDE 2000 MILLILITER(S): 9 INJECTION INTRAMUSCULAR; INTRAVENOUS; SUBCUTANEOUS at 15:54

## 2021-03-03 RX ADMIN — SODIUM CHLORIDE 1000 MILLILITER(S): 9 INJECTION INTRAMUSCULAR; INTRAVENOUS; SUBCUTANEOUS at 11:03

## 2021-03-03 RX ADMIN — SODIUM CHLORIDE 1000 MILLILITER(S): 9 INJECTION INTRAMUSCULAR; INTRAVENOUS; SUBCUTANEOUS at 12:01

## 2021-03-03 RX ADMIN — Medication 25 MILLIGRAM(S): at 18:03

## 2021-03-03 RX ADMIN — Medication 1 MILLIGRAM(S): at 18:03

## 2021-03-03 RX ADMIN — ONDANSETRON 4 MILLIGRAM(S): 8 TABLET, FILM COATED ORAL at 10:47

## 2021-03-03 RX ADMIN — ATORVASTATIN CALCIUM 20 MILLIGRAM(S): 80 TABLET, FILM COATED ORAL at 22:34

## 2021-03-03 RX ADMIN — Medication 2 MILLIGRAM(S): at 12:30

## 2021-03-03 RX ADMIN — SODIUM CHLORIDE 125 MILLILITER(S): 9 INJECTION INTRAMUSCULAR; INTRAVENOUS; SUBCUTANEOUS at 18:51

## 2021-03-03 NOTE — ED ADULT NURSE NOTE - NURSING GU BLADDER
Subjective:          Chief Complaint: Mildred Jain is a 54 y.o. female who had concerns including Injections of the Left Knee and Injections of the Right Knee.    HPI   Patient presents to clinic for bilateral Synvisc One injections.  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 She has had left knee pain x 2 years  She has a left knee steroid injection by myself on 2016 which provided relief of pain for 6 months. She states that she has been having some swelling in her left knee with increased activity. She states that she notices that her left lower leg also swells as the day goes on. Pain in her knee is 3/10 and is a constant pain. Pain at its worst is 5/10. + mechanical symptoms. - instability. States that she has been elevating her leg at night. She has also been taking Mobic 15mg once a day. Feels a catching in her knee like it doesn't completely extend at times especially when going up and down stairs She wears a visco skin on days when she is on her feet a lot. She has been doing her HEP occasionally. She is requesting a steroid injection today because she is going on vacaton to [place] in one week.      Hx of Left knee ACL reconstruction and lateral meniscus repair on 2011 by Dr. Alonso Larsen     Review of Systems   Constitution: Negative. Negative for chills, fever, weight gain and weight loss.   HENT: Negative for congestion, headaches and sore throat.    Eyes: Negative for blurred vision and double vision.   Cardiovascular: Negative for chest pain, leg swelling and palpitations.   Respiratory: Negative for cough and shortness of breath.    Hematologic/Lymphatic: Does not bruise/bleed easily.   Skin: Negative for itching, poor wound healing and rash.   Musculoskeletal: Positive for joint pain, joint swelling and stiffness. Negative for back pain, muscle weakness and myalgias.   Gastrointestinal: Negative for abdominal pain, constipation, diarrhea, nausea  and vomiting.   Genitourinary: Negative.  Negative for frequency and hematuria.   Neurological: Negative for dizziness, numbness, paresthesias and sensory change.   Psychiatric/Behavioral: Negative for altered mental status and depression. The patient is not nervous/anxious.    Allergic/Immunologic: Negative for hives.       Pain Related Questions  Over the past 3 days, what was your average pain during activity? (I.e. running, jogging, walking, climbing stairs, getting dressed, ect.): 4  Over the past 3 days, what was your highest pain level?: 5  Over the past 3 days, what was your lowest pain level? : 3    Other  How many nights a week are you awakened by your affected body part?: 0  Was the patient's HEIGHT measured or patient reported?: Patient Reported  Was the patient's WEIGHT measured or patient reported?: Measured      Objective:        General: Mildred is well-developed, well-nourished, appears stated age, in no acute distress, alert and oriented to time, place and person.     General    Vitals reviewed.  Constitutional: She is oriented to person, place, and time. She appears well-developed and well-nourished. No distress.   Neck: Normal range of motion.   Cardiovascular: Normal rate and regular rhythm.    Pulmonary/Chest: Effort normal. No respiratory distress.   Neurological: She is alert and oriented to person, place, and time.   Psychiatric: She has a normal mood and affect. Her behavior is normal. Thought content normal.     General Musculoskeletal Exam   Gait: normal       Right Knee Exam     Inspection   Erythema: absent  Scars: absent  Swelling: absent  Effusion: effusion  Deformity: deformity  Bruising: absent    Tenderness   The patient is experiencing no tenderness.         Range of Motion   Extension: 0   Flexion: 140     Tests   Meniscus   Chen:  Medial - negative Lateral - negative  Ligament Examination Lachman: normal (-1 to 2mm) PCL-Posterior Drawer: normal (0 to 2mm)     MCL - Valgus:  normal (0 to 2mm)  LCL - Varus: normalPivot Shift: normal (Equal)Reverse Pivot Shift: normal (Equal)Dial Test at 30 degrees: normal (< 5 degrees)Dial Test at 90 degrees: normal (< 5 degrees)  Posterior Sag Test: negative  Patella   Patellar Glide (quadrants): Lateral - 1   Medial - 2  Patellar Grind: negative  J-Sign: none    Other   Meniscal Cyst: absent  Popliteal (Baker's) Cyst: absent  Sensation: normal    Left Knee Exam     Inspection   Erythema: absent  Scars: present  Swelling: absent (lower leg)  Effusion: absent  Deformity: deformity  Bruising: absent    Tenderness   The patient tender to palpation of the medial joint line and lateral joint line.    Range of Motion   Extension: -5   Left knee flexion: 135.     Tests   Meniscus   Chen:  Medial - positive Lateral - positive  Stability Lachman: normal (-1 to 2mm) PCL-Posterior Drawer: normal (0 to 2mm)  MCL - Valgus: normal (0 to 2mm)  LCL - Varus: normal (0 to 2mm)Pivot Shift: normal (Equal)Reverse Pivot Shift: normal (Equal)Dial Test at 30 degrees: normal (< 5 degrees)Dial Test at 90 degrees: normal (< 5 degrees)  Posterior Sag Test: negative  Patella   Patellar Glide (Quadrants): Lateral - 1 Medial - 2  Patellar Grind: negative  J-Sign: J sign absent    Other   Meniscal Cyst: absent  Popliteal (Baker's) Cyst: absent  Sensation: normal    Right Hip Exam     Tests   Tiffani: negative  Left Hip Exam     Tests   Tiffani: negative          Muscle Strength   Right Lower Extremity   Hip Abduction: 5/5   Quadriceps:  5/5   Hamstrin/5   Left Lower Extremity   Hip Abduction: 5/5   Quadriceps:  5/5   Hamstrin/5     Vascular Exam     Right Pulses  Dorsalis Pedis:      2+  Posterior Tibial:      2+        Left Pulses  Dorsalis Pedis:      2+  Posterior Tibial:      2+        Edema  Right Lower Leg: absent  Left Lower Leg: absent        RADIOGRAPHS:  Findings:    As observed on the earlier study the patient is status post left cruciate ligament reconstruction.   Small tibiofemoral osteophytes noted.  There may be a small amount of fluid in the left suprapatellar bursa, no more than on 3/23/2060.    I detect no fracture, dislocation, unusual radiopaque retained foreign body, lytic or blastic lesion, erosion or chondrocalcinosis.    MRI Left Knee:  Results:     Postsurgical changes compatible with a previous ACL reconstruction and left meniscal repair are identified. The ACL graft appears intact. The PCL is also intact. The lateral meniscus demonstrates no evidence of any new tear. The medial meniscus appears normal.    The medial and lateral collateral ligaments are intact.    Focal full-thickness fissure of the patellar cartilage at the mid level apex without associated subchondral edema. The cartilage of the medial and lateral knee compartments is intact.    No tear is seen in the extensor mechanism.No signal abnormality is present to suggest a marrow replacement process.There are no fractures.There is no effusion or synovial abnormality.      Assessment:       Encounter Diagnoses   Name Primary?    Primary osteoarthritis of left knee Yes    Primary osteoarthritis of right knee           Plan:       1. Continue Mobic 15mg once a day  2. IKDC, SF-12 and KOOS was not filled out today in clinic.      RTC in 3 months with Kaylee Pretty PA-C for follow up left knee. Patient will not fill out IKDC, SF-12 and KOOS on return.    3. Continue Patellofemoral and Core HEP.   4. Continue Visco skin.  5. Possible arthroscopy if no improvement  6. Injection Procedure  A time out was performed, including verification of patient ID, procedure, site and side, availability of information and equipment, review of safety issues, and agreement with consent, the procedure site was marked.    After time out was performed, the patient was prepped aseptically with povidone-iodine swabsticks. A diagnostic and therapeutic injection of 6cc SynviscOne was given under sterile technique using a 22g x  1.5 needle from the Superolateral  aspect of the bilateral Knee Joint in the supine position.      Mildred Jain had no adverse reactions to the medication. Pain decreased. She was instructed to apply ice to the joint for 20 minutes and avoid strenuous activities for 24-36 hours following the injection. She was warned of possible blood sugar and/or blood pressure changes during that time. Following that time, she can resume regular activities.    She was reminded to call the clinic immediately for any adverse side effects as explained in clinic today.                                 Patient questionnaires may have been collected.     non-distended

## 2021-03-03 NOTE — ED PROVIDER NOTE - CLINICAL SUMMARY MEDICAL DECISION MAKING FREE TEXT BOX
Patient with ETOH abuse no alcohol for about 1-2 days now with vomiting, pain and tremors.  Fasciculations and tremors noted.  CIWA score 21.  Patient given ativan.  Will check labs, EKG, order antiemetics and given IVF.  Will re-eval if no significant improvement will admit.

## 2021-03-03 NOTE — ED ADULT NURSE REASSESSMENT NOTE - NS ED NURSE REASSESS COMMENT FT1
pt very hard IV stick multiple attempts have been made. Dr. keen has attempted with USIV with no success. Pt has 22g IV in R foot. Attempt for EJ and/or central line will be made by provider. Pt sleeping in bed.

## 2021-03-03 NOTE — ED PROVIDER NOTE - OBJECTIVE STATEMENT
This patient is a 55 year old man hx of alcohol abuse who presents to the ER c/o nausea, vomiting and abdominal pain x 2-3 days after drinking alcohol.  He denies fever, sick contacts, and recent travel.  Patient reports diffuse abdominal pain 9/10 in severity as well as tremors.

## 2021-03-03 NOTE — H&P ADULT - NSHPPHYSICALEXAM_GEN_ALL_CORE
PHYSICAL EXAMINATION:  Vital Signs Last 24 Hrs  T(C): 36.8 (03 Mar 2021 15:52), Max: 37.2 (03 Mar 2021 11:10)  T(F): 98.2 (03 Mar 2021 15:52), Max: 98.9 (03 Mar 2021 11:10)  HR: 122 (03 Mar 2021 15:52) (118 - 143)  BP: 144/88 (03 Mar 2021 15:52) (135/98 - 155/97)  BP(mean): --  RR: 13 (03 Mar 2021 15:52) (13 - 24)  SpO2: 99% (03 Mar 2021 15:52) (98% - 99%)  CAPILLARY BLOOD GLUCOSE      POCT Blood Glucose.: 160 mg/dL (03 Mar 2021 10:03)      GENERAL: NAD, well-groomed, well-developed  HEAD:  atraumatic, normocephalic  EYES: sclera anicteric  ENMT: mucous membranes moist  NECK: supple, No JVD  CHEST/LUNG: clear to auscultation bilaterally; no rales, rhonchi, or wheezing b/l  HEART: normal S1, S2  ABDOMEN: BS+, soft, ND, NT   EXTREMITIES:  pulses palpable; no clubbing, cyanosis, or edema b/l LEs  NEURO: awake, alert, interactive; moves all extremities  SKIN: no rashes or lesions

## 2021-03-03 NOTE — ED ADULT TRIAGE NOTE - CHIEF COMPLAINT QUOTE
56 y/o male with PMH of HTN, HLD & Gerd. BIBA with c/c of nausea, vomiting and abdominal pain that came on this morning after drinking alcohol. PT mary ellen morales in triage.

## 2021-03-03 NOTE — H&P ADULT - ASSESSMENT
55 year old male admitted with nausea and vomiting for past few days. Long history of ETOH abuse and DT. Denies ETOH use for past  24 hour. No CP, fevers, HA, abdominal pain, melena, BRBPR or hematuria.  On arrival had ROSA from dehydration.       Renal: ROSA from dehydration. IVF normal saline at 125/h. Lactate better on repeat to 7.2 from 16 on arrival.   Increased anion gap from dehydration. Zofran prn nausea.     Start CIWA protocol with IV Ativan.     CV: Continue Lipitor 20 mg/day, add Lopressor 25 mg bid for HR and BP control. Stop ASA given history of PUD.

## 2021-03-03 NOTE — H&P ADULT - HISTORY OF PRESENT ILLNESS
55 year old male admitted with nausea and vomiting for past few days. Long history of ETOH abuse and DT. Denies ETOH use for past  24 hour. No CP, fevers, HA, abdominal pain, melena, BRBPR or hematuria.     On arrival had ROSA from dehydration.

## 2021-03-03 NOTE — ED PROVIDER NOTE - ENMT, MLM
Airway patent, Nasal mucosa clear. Mouth with normal mucosa. Throat has no vesicles, no oropharyngeal exudates and uvula is midline.  + tongue fasciculations

## 2021-03-03 NOTE — SBIRT NOTE ADULT - NSSBIRTBRIEFINTDET_GEN_A_CORE
Provided SBIRT services: Full screen positive. Brief Intervention Performed. Screening results were reviewed with the patient and patient was provided information about healthy guidelines and potential negative consequences associated with level of risk. Motivation and readiness to reduce or stop use was discussed and goals and activities to make changes were suggested/offered. Pt reports drinking a pint each occasion but does not feel like his drinking is an issue. Pt kept talking about wife and son abstaining from alcohol and that his drinking is 'okay' because he only drinks on the weekend. Pt not receptive to discussion about healthy guidelines/tx.

## 2021-03-03 NOTE — ED PROVIDER NOTE - SECONDARY DIAGNOSIS.
[General Appearance - Well Developed] : well developed [General Appearance - Well Nourished] : well nourished [Normal Appearance] : normal appearance [Well Groomed] : well groomed [General Appearance - In No Acute Distress] : no acute distress [Abdomen Soft] : soft [Abdomen Tenderness] : non-tender [Abdomen Mass (___ Cm)] : no abdominal mass palpated [Abdomen Hernia] : no hernia was discovered [Costovertebral Angle Tenderness] : no ~M costovertebral angle tenderness [Urethral Meatus] : meatus normal [Penis Abnormality] : normal circumcised penis [Urinary Bladder Findings] : the bladder was normal on palpation [Scrotum] : the scrotum was normal [Testes Tenderness] : no tenderness of the testes [Testes Mass (___cm)] : there were no testicular masses [Prostate Tenderness] : the prostate was not tender [No Prostate Nodules] : no prostate nodules [Skin Color & Pigmentation] : normal skin color and pigmentation [Heart Rate And Rhythm] : Heart rate and rhythm were normal [Edema] : no peripheral edema [] : no respiratory distress [Respiration, Rhythm And Depth] : normal respiratory rhythm and effort [Exaggerated Use Of Accessory Muscles For Inspiration] : no accessory muscle use [Oriented To Time, Place, And Person] : oriented to person, place, and time [Affect] : the affect was normal [Mood] : the mood was normal [Not Anxious] : not anxious [Normal Station and Gait] : the gait and station were normal for the patient's age Abdominal pain [No Focal Deficits] : no focal deficits [No Palpable Adenopathy] : no palpable adenopathy [FreeTextEntry1] : Left caput cyst (2cm). Glanular hypospadias

## 2021-03-03 NOTE — ED ADULT NURSE NOTE - OBJECTIVE STATEMENT
pt c/o of coffee ground emesis that started today along with body tremors and aches. Pt has hx of alcohol abuse, pt last drink was 24 hours ago. Pt tachycardic  upon arrival, tremulous and vomiting. Pt a&0x3 Pt has bilateral hand edema as well.

## 2021-03-03 NOTE — H&P ADULT - NSHPLABSRESULTS_GEN_ALL_CORE
LABS:                        14.8   7.80  )-----------( 386      ( 03 Mar 2021 10:42 )             47.3     03-03    145  |  105  |  22  ----------------------------<  149<H>  4.4   |  12<L>  |  2.00<H>    Ca    9.5      03 Mar 2021 10:42    TPro  9.6<H>  /  Alb  4.5  /  TBili  0.3  /  DBili  x   /  AST  31  /  ALT  35  /  AlkPhos  62  03-03    PT/INR - ( 03 Mar 2021 10:54 )   PT: 11.7 sec;   INR: 1.01 ratio                 RADIOLOGY & ADDITIONAL TESTS:

## 2021-03-04 LAB
ANION GAP SERPL CALC-SCNC: 8 MMOL/L — SIGNIFICANT CHANGE UP (ref 5–17)
BUN SERPL-MCNC: 17 MG/DL — SIGNIFICANT CHANGE UP (ref 7–23)
CALCIUM SERPL-MCNC: 8.1 MG/DL — LOW (ref 8.5–10.1)
CHLORIDE SERPL-SCNC: 117 MMOL/L — HIGH (ref 96–108)
CO2 SERPL-SCNC: 23 MMOL/L — SIGNIFICANT CHANGE UP (ref 22–31)
CREAT SERPL-MCNC: 1.18 MG/DL — SIGNIFICANT CHANGE UP (ref 0.5–1.3)
GLUCOSE SERPL-MCNC: 94 MG/DL — SIGNIFICANT CHANGE UP (ref 70–99)
LACTATE SERPL-SCNC: 1 MMOL/L — SIGNIFICANT CHANGE UP (ref 0.7–2)
POTASSIUM SERPL-MCNC: 4 MMOL/L — SIGNIFICANT CHANGE UP (ref 3.5–5.3)
POTASSIUM SERPL-SCNC: 4 MMOL/L — SIGNIFICANT CHANGE UP (ref 3.5–5.3)
SODIUM SERPL-SCNC: 148 MMOL/L — HIGH (ref 135–145)

## 2021-03-04 PROCEDURE — 99233 SBSQ HOSP IP/OBS HIGH 50: CPT

## 2021-03-04 RX ORDER — METOPROLOL TARTRATE 50 MG
25 TABLET ORAL
Refills: 0 | Status: DISCONTINUED | OUTPATIENT
Start: 2021-03-04 | End: 2021-03-18

## 2021-03-04 RX ORDER — THIAMINE MONONITRATE (VIT B1) 100 MG
100 TABLET ORAL DAILY
Refills: 0 | Status: DISCONTINUED | OUTPATIENT
Start: 2021-03-04 | End: 2021-03-10

## 2021-03-04 RX ADMIN — Medication 2 MILLIGRAM(S): at 11:19

## 2021-03-04 RX ADMIN — Medication 2 MILLIGRAM(S): at 23:37

## 2021-03-04 RX ADMIN — Medication 25 MILLIGRAM(S): at 05:33

## 2021-03-04 RX ADMIN — Medication 2 MILLIGRAM(S): at 19:58

## 2021-03-04 RX ADMIN — Medication 2 MILLIGRAM(S): at 05:55

## 2021-03-04 RX ADMIN — Medication 100 MILLIGRAM(S): at 11:04

## 2021-03-04 RX ADMIN — ATORVASTATIN CALCIUM 20 MILLIGRAM(S): 80 TABLET, FILM COATED ORAL at 21:04

## 2021-03-04 RX ADMIN — Medication 1 MILLIGRAM(S): at 11:04

## 2021-03-04 RX ADMIN — Medication 2 MILLIGRAM(S): at 00:45

## 2021-03-04 RX ADMIN — Medication 2 MILLIGRAM(S): at 04:19

## 2021-03-04 RX ADMIN — SODIUM CHLORIDE 125 MILLILITER(S): 9 INJECTION INTRAMUSCULAR; INTRAVENOUS; SUBCUTANEOUS at 05:32

## 2021-03-04 RX ADMIN — PANTOPRAZOLE SODIUM 40 MILLIGRAM(S): 20 TABLET, DELAYED RELEASE ORAL at 05:55

## 2021-03-04 RX ADMIN — Medication 2 MILLIGRAM(S): at 14:42

## 2021-03-04 RX ADMIN — ONDANSETRON 4 MILLIGRAM(S): 8 TABLET, FILM COATED ORAL at 11:17

## 2021-03-04 RX ADMIN — Medication 2 MILLIGRAM(S): at 02:53

## 2021-03-04 NOTE — PROGRESS NOTE ADULT - SUBJECTIVE AND OBJECTIVE BOX
HPI:  55 year old male admitted with nausea and vomiting for past few days. Long history of ETOH abuse and DT. Denies ETOH use for past  24 hour. No CP, fevers, HA, abdominal pain, melena, BRBPR or hematuria.     On arrival had ROSA from dehydration.    (03 Mar 2021 16:37)    Patient is a 55y old  Male who presents with a chief complaint of ETOH withdrawal and ROSA from dehydration. (03 Mar 2021 16:37)      INTERVAL HPI/OVERNIGHT EVENTS: CIWA 7     MEDICATIONS  (STANDING):  atorvastatin 20 milliGRAM(s) Oral at bedtime  folic acid 1 milliGRAM(s) Oral daily  metoprolol tartrate 25 milliGRAM(s) Oral two times a day  pantoprazole    Tablet 40 milliGRAM(s) Oral before breakfast  PHENobarbital Injectable 130 milliGRAM(s) IV Push every 6 hours  thiamine 100 milliGRAM(s) Oral daily    MEDICATIONS  (PRN):  ondansetron Injectable 4 milliGRAM(s) IV Push every 6 hours PRN Nausea and/or Vomiting  PHENobarbital Injectable 130 milliGRAM(s) IV Push every 2 hours PRN ciwa >8      Allergies    No Known Allergies    Intolerances        REVIEW OF SYSTEMS:  CONSTITUTIONAL: No fever, weight loss, or fatigue  EYES: No eye pain, visual disturbances, or discharge  ENMT:  No difficulty hearing, tinnitus, vertigo; No sinus or throat pain  NECK: No pain or stiffness  BREASTS: No pain, masses, or nipple discharge  RESPIRATORY: No cough, wheezing, chills or hemoptysis; No shortness of breath  CARDIOVASCULAR: No chest pain, palpitations, dizziness, or leg swelling  GASTROINTESTINAL: No abdominal or epigastric pain. No nausea, vomiting, or hematemesis; No diarrhea or constipation. No melena or hematochezia.  GENITOURINARY: No dysuria, frequency, hematuria, or incontinence  NEUROLOGICAL:  tremors  SKIN: No itching, burning, rashes, or lesions   LYMPH NODES: No enlarged glands  ENDOCRINE: No heat or cold intolerance; No hair loss  MUSCULOSKELETAL: No joint pain or swelling; No muscle, back, or extremity pain  PSYCHIATRIC: anxiety,   HEME/LYMPH: No easy bruising, or bleeding gums  ALLERGY AND IMMUNOLOGIC: No hives or eczema    Vital Signs Last 24 Hrs  T(C): 36.8 (05 Mar 2021 05:14), Max: 36.8 (04 Mar 2021 15:45)  T(F): 98.2 (05 Mar 2021 05:14), Max: 98.3 (04 Mar 2021 15:45)  HR: 58 (05 Mar 2021 05:14) (52 - 76)  BP: 155/96 (05 Mar 2021 05:14) (136/89 - 155/96)  RR: 18 (05 Mar 2021 05:14) (16 - 18)  SpO2: 99% (05 Mar 2021 05:14) (98% - 99%)    PHYSICAL EXAM:  GENERAL: NAD, well-groomed, well-developed  HEAD:  Atraumatic, Normocephalic  EYES: EOMI, PERRLA, conjunctiva and sclera clear  ENMT: No tonsillar erythema, exudates, or enlargement; Moist mucous membranes, Good dentition, No lesions  NECK: Supple, No JVD, Normal thyroid  NERVOUS SYSTEM:  Alert & Oriented X3, poor concentration; Motor Strength 5/5 B/L upper and lower extremities; DTRs 2+ intact and symmetric  CHEST/LUNG: Clear to percussion bilaterally; No rales, rhonchi, wheezing, or rubs  HEART: Regular rate and rhythm; No murmurs, rubs, or gallops  ABDOMEN: Soft, Nontender, Nondistended; Bowel sounds present  EXTREMITIES:  2+ Peripheral Pulses, No clubbing, cyanosis, or edema  LYMPH: No lymphadenopathy noted  SKIN: No rashes or lesions    LABS:                        14.8   7.80  )-----------( 386      ( 03 Mar 2021 10:42 )             47.3     03-04    148<H>  |  117<H>  |  17  ----------------------------<  94  4.0   |  23  |  1.18    Ca    8.1<L>      04 Mar 2021 08:08    TPro  9.6<H>  /  Alb  4.5  /  TBili  0.3  /  DBili  x   /  AST  31  /  ALT  35  /  AlkPhos  62  03-03    PT/INR - ( 03 Mar 2021 10:54 )   PT: 11.7 sec;   INR: 1.01 ratio             CAPILLARY BLOOD GLUCOSE      POCT Blood Glucose.: 92 mg/dL (05 Mar 2021 05:40)  POCT Blood Glucose.: 94 mg/dL (05 Mar 2021 03:53)      RADIOLOGY & ADDITIONAL TESTS:    Imaging Personally Reviewed:  [ ] YES  [ ] NO    Consultant(s) Notes Reviewed:  [ ] YES  [ ] NO    Care Discussed with Consultants/Other Providers [ ] YES  [ ] NO

## 2021-03-05 LAB
GLUCOSE BLDC GLUCOMTR-MCNC: 92 MG/DL — SIGNIFICANT CHANGE UP (ref 70–99)
GLUCOSE BLDC GLUCOMTR-MCNC: 94 MG/DL — SIGNIFICANT CHANGE UP (ref 70–99)
SARS-COV-2 IGG SERPL QL IA: POSITIVE
SARS-COV-2 IGM SERPL IA-ACNC: 4.42 INDEX — HIGH

## 2021-03-05 PROCEDURE — 76937 US GUIDE VASCULAR ACCESS: CPT | Mod: 26

## 2021-03-05 PROCEDURE — 36410 VNPNXR 3YR/> PHY/QHP DX/THER: CPT

## 2021-03-05 PROCEDURE — 99291 CRITICAL CARE FIRST HOUR: CPT

## 2021-03-05 PROCEDURE — 99233 SBSQ HOSP IP/OBS HIGH 50: CPT

## 2021-03-05 RX ORDER — PHENOBARBITAL 60 MG
260 TABLET ORAL ONCE
Refills: 0 | Status: DISCONTINUED | OUTPATIENT
Start: 2021-03-05 | End: 2021-03-05

## 2021-03-05 RX ORDER — ENOXAPARIN SODIUM 100 MG/ML
40 INJECTION SUBCUTANEOUS DAILY
Refills: 0 | Status: DISCONTINUED | OUTPATIENT
Start: 2021-03-05 | End: 2021-03-24

## 2021-03-05 RX ORDER — PHENOBARBITAL 60 MG
130 TABLET ORAL EVERY 6 HOURS
Refills: 0 | Status: DISCONTINUED | OUTPATIENT
Start: 2021-03-05 | End: 2021-03-09

## 2021-03-05 RX ORDER — CHLORHEXIDINE GLUCONATE 213 G/1000ML
1 SOLUTION TOPICAL
Refills: 0 | Status: DISCONTINUED | OUTPATIENT
Start: 2021-03-05 | End: 2021-03-12

## 2021-03-05 RX ORDER — PHENOBARBITAL 60 MG
130 TABLET ORAL ONCE
Refills: 0 | Status: DISCONTINUED | OUTPATIENT
Start: 2021-03-05 | End: 2021-03-05

## 2021-03-05 RX ORDER — PHENOBARBITAL 60 MG
260 TABLET ORAL EVERY 6 HOURS
Refills: 0 | Status: DISCONTINUED | OUTPATIENT
Start: 2021-03-05 | End: 2021-03-05

## 2021-03-05 RX ORDER — DIPHENHYDRAMINE HCL 50 MG
50 CAPSULE ORAL ONCE
Refills: 0 | Status: COMPLETED | OUTPATIENT
Start: 2021-03-05 | End: 2021-03-05

## 2021-03-05 RX ORDER — DEXMEDETOMIDINE HYDROCHLORIDE IN 0.9% SODIUM CHLORIDE 4 UG/ML
0.7 INJECTION INTRAVENOUS
Qty: 200 | Refills: 0 | Status: DISCONTINUED | OUTPATIENT
Start: 2021-03-05 | End: 2021-03-15

## 2021-03-05 RX ORDER — PHENOBARBITAL 60 MG
130 TABLET ORAL
Refills: 0 | Status: DISCONTINUED | OUTPATIENT
Start: 2021-03-05 | End: 2021-03-15

## 2021-03-05 RX ADMIN — Medication 2 MILLIGRAM(S): at 03:29

## 2021-03-05 RX ADMIN — Medication 130 MILLIGRAM(S): at 23:22

## 2021-03-05 RX ADMIN — Medication 100 MILLIGRAM(S): at 12:56

## 2021-03-05 RX ADMIN — Medication 130 MILLIGRAM(S): at 18:21

## 2021-03-05 RX ADMIN — Medication 2 MILLIGRAM(S): at 05:00

## 2021-03-05 RX ADMIN — Medication 50 MILLIGRAM(S): at 01:51

## 2021-03-05 RX ADMIN — Medication 130 MILLIGRAM(S): at 12:56

## 2021-03-05 RX ADMIN — DEXMEDETOMIDINE HYDROCHLORIDE IN 0.9% SODIUM CHLORIDE 13.2 MICROGRAM(S)/KG/HR: 4 INJECTION INTRAVENOUS at 23:22

## 2021-03-05 RX ADMIN — Medication 130 MILLIGRAM(S): at 04:01

## 2021-03-05 RX ADMIN — Medication 2 MILLIGRAM(S): at 01:51

## 2021-03-05 RX ADMIN — Medication 130 MILLIGRAM(S): at 04:07

## 2021-03-05 RX ADMIN — DEXMEDETOMIDINE HYDROCHLORIDE IN 0.9% SODIUM CHLORIDE 13.2 MICROGRAM(S)/KG/HR: 4 INJECTION INTRAVENOUS at 08:43

## 2021-03-05 RX ADMIN — Medication 1 MILLIGRAM(S): at 12:56

## 2021-03-05 RX ADMIN — Medication 4 MILLIGRAM(S): at 06:38

## 2021-03-05 RX ADMIN — ENOXAPARIN SODIUM 40 MILLIGRAM(S): 100 INJECTION SUBCUTANEOUS at 12:56

## 2021-03-05 RX ADMIN — Medication 130 MILLIGRAM(S): at 07:49

## 2021-03-05 RX ADMIN — DEXMEDETOMIDINE HYDROCHLORIDE IN 0.9% SODIUM CHLORIDE 13.2 MICROGRAM(S)/KG/HR: 4 INJECTION INTRAVENOUS at 10:27

## 2021-03-05 RX ADMIN — Medication 260 MILLIGRAM(S): at 06:02

## 2021-03-05 RX ADMIN — CHLORHEXIDINE GLUCONATE 1 APPLICATION(S): 213 SOLUTION TOPICAL at 12:56

## 2021-03-05 RX ADMIN — Medication 130 MILLIGRAM(S): at 21:00

## 2021-03-05 NOTE — CHART NOTE - NSCHARTNOTEFT_GEN_A_CORE
Called by medicine PA to see pt after RRT for agitation 2/2 EtOH withdrawal. Pt now s/p Ativan 2mg and phenobarb 260mg.   54yo M with longstanding hx of EtOH abuse, with frequent hospitalizations for treatment of withdrawal. Admitted on 3/3 for nausea and vomiting, with ROSA.   Pt seen and examined at bedside; currently disoriented but redirectable, not agitated. Vitals stable, pt in NAD, no complaints at present other than that he is hungry and has not eaten. Physical exam benign. Labs reviewed, wnl; lactic acidosis and ROSA resolved.   Spoke to medicine PA and hospitalist Dr. Salazar. Recommend more frequent dosing of Ativan as 2mg q6h will likely not be adequate.   Pt does not require ICU level of care at this time; should pt's EtOH withdrawal worsen/DTs occus, please reconsult ICU team for closer monitoring.

## 2021-03-05 NOTE — PROCEDURE NOTE - ADDITIONAL PROCEDURE DETAILS
Midline placed into left basilic vein under ultrasound guidance. 4fr by 20cm, single lumen. No intraoperative complications. Prior to start of procedure patient had indurated infiltrate adjacent to site of midline placement. Midline can be access. Midline placed into left basilic vein under ultrasound guidance. 4fr by 20cm, single lumen. No intraoperative complications. Of note, prior to start of procedure patient had indurated infiltrate adjacent to site of midline placement. Midline can be access.

## 2021-03-05 NOTE — DIETITIAN INITIAL EVALUATION ADULT. - OTHER INFO
Pt c is known to RD from previous adm.    Pt was fast asleep when seen, confusion noted.  Pt lived at home PTA.  Previous referral for food insecurity noted on 11/04/20.

## 2021-03-05 NOTE — DIETITIAN INITIAL EVALUATION ADULT. - PERTINENT MEDS FT
MEDICATIONS  (STANDING):  atorvastatin 20 milliGRAM(s) Oral at bedtime  chlorhexidine 4% Liquid 1 Application(s) Topical <User Schedule>  dexMEDEtomidine Infusion 0.7 MICROgram(s)/kG/Hr (13.2 mL/Hr) IV Continuous <Continuous>  enoxaparin Injectable 40 milliGRAM(s) SubCutaneous daily  folic acid 1 milliGRAM(s) Oral daily  metoprolol tartrate 25 milliGRAM(s) Oral two times a day  pantoprazole    Tablet 40 milliGRAM(s) Oral before breakfast  PHENobarbital Injectable 130 milliGRAM(s) IV Push every 6 hours  thiamine 100 milliGRAM(s) Oral daily    MEDICATIONS  (PRN):  ondansetron Injectable 4 milliGRAM(s) IV Push every 6 hours PRN Nausea and/or Vomiting  PHENobarbital Injectable 130 milliGRAM(s) IV Push every 2 hours PRN ciwa >8

## 2021-03-05 NOTE — PHARMACY COMMUNICATION NOTE - REASON FOR NOTE
s/w PA wants lorazepam IM til they get IV access-wants phenobarb then-aware of combination-they are taking BP and HR

## 2021-03-05 NOTE — PROGRESS NOTE ADULT - ASSESSMENT
55 year old male admitted with nausea and vomiting for past few days. Long history of ETOH abuse and DT. Denies ETOH use for past  24 hour. No CP, fevers, HA, abdominal pain, melena, BRBPR or hematuria.  On arrival had ROSA from dehydration.       critical care for increased CIWA continue care per ICU

## 2021-03-05 NOTE — DIETITIAN INITIAL EVALUATION ADULT. - PERTINENT LABORATORY DATA
03-04 Na148 mmol/L<H> Glu 94 mg/dL K+ 4.0 mmol/L Cr  1.18 mg/dL BUN 17 mg/dL 03-03 Alb 4.5 g/dL03-03 ALT 35 U/L AST 31 U/L Alkaline Phosphatase 62 U/L  03/03, Glucose 149 mg/dL <H>

## 2021-03-05 NOTE — DIETITIAN INITIAL EVALUATION ADULT. - NS FNS REASON FOR WEIGHT CHANG
As per current adm wt. of 75.6 kg/165.7 LBS, pt c wt. loss of 20 lbs from 04/06/18 wt. 84.7 kg/186.7 LBS & reported usual wt. 186 LBS/other (specify)

## 2021-03-05 NOTE — PROGRESS NOTE ADULT - ATTENDING COMMENTS
55M PMH long standing ETOH abuse w/frequent admissions for alcohol intoxication and withdrawal/DTs, GERD, HLD, alcoholic gastritis/esophagitis, PUD, hx of hypoxic respiratory failure requiring intubation due to aspiration PNA, most recently intubated April 2020 for hypoxic resp failure secondary to asp PNA with course complicated by septic shock and ROSA, and has had multiple admissions since then for abdominal pain secondary to alcoholic gastritis with hemorrhage with most recent admission one month ago 2/10-2/11, COVID-19 infection Dec 2020 presents now on 3/3 for nausea, abdominal pain. Found to have elevated Cr, lactate. Admitted for ROSA due to dehydration, alcoholic gastritis. Course complicated by DTs with CIWA 27. transferred to ICU today 3/5 for DTs and sedation protocol for severe agitation.    1. NEURO  - start precedex drip  - cont with standing phenobarbital 130mg IV q6 hrs  - pt with long standing hx of alcohol abuse and dependence with multiple admissions throughout the years and has not shown interest in alcohol cessation.  - cont MVI/Thamine/folic acid    2. PULM  - protecting airway  - end tidal Co2 for monitoring while being sedated    3. CV  - hemodynamically stable    4. GI  - hx of alcoholic gastritis  - cont with protonix  - no active bleeding at present time  - needs alcohol cessation    5. RENAL  - monitor and supplement electrolytes as needed  - initial lactate cleared: elevation likely due to underlying alcoholism    6. GEN  - will monitor in ICU during acute DT phase and sedated    Critical Care Time: 35 min  D/W CC NP

## 2021-03-05 NOTE — PROGRESS NOTE ADULT - SUBJECTIVE AND OBJECTIVE BOX
HPI:  55 year old male admitted with nausea and vomiting for past few days. Long history of ETOH abuse and DT. Denies ETOH use for past  24 hour. No CP, fevers, HA, abdominal pain, melena, BRBPR or hematuria.     On arrival had ROSA from dehydration.    (03 Mar 2021 16:37)    Patient is a 55y old  Male who presents with a chief complaint of ETOH withdrawal and ROSA from dehydration. (05 Mar 2021 11:16)      INTERVAL HPI/OVERNIGHT EVENTS: transfered to ICU for elevated CIWA     MEDICATIONS  (STANDING):  atorvastatin 20 milliGRAM(s) Oral at bedtime  chlorhexidine 4% Liquid 1 Application(s) Topical <User Schedule>  dexMEDEtomidine Infusion 0.7 MICROgram(s)/kG/Hr (13.2 mL/Hr) IV Continuous <Continuous>  enoxaparin Injectable 40 milliGRAM(s) SubCutaneous daily  folic acid 1 milliGRAM(s) Oral daily  metoprolol tartrate 25 milliGRAM(s) Oral two times a day  pantoprazole    Tablet 40 milliGRAM(s) Oral before breakfast  PHENobarbital Injectable 130 milliGRAM(s) IV Push every 6 hours  thiamine 100 milliGRAM(s) Oral daily    MEDICATIONS  (PRN):  ondansetron Injectable 4 milliGRAM(s) IV Push every 6 hours PRN Nausea and/or Vomiting  PHENobarbital Injectable 130 milliGRAM(s) IV Push every 2 hours PRN ciwa >8      Allergies    No Known Allergies    Intolerances        REVIEW OF SYSTEMS:  Somulant unable to provide     Vital Signs Last 24 Hrs  T(C): 36.1 (05 Mar 2021 15:06), Max: 37 (05 Mar 2021 08:45)  T(F): 97 (05 Mar 2021 15:06), Max: 98.6 (05 Mar 2021 08:45)  HR: 59 (05 Mar 2021 17:31) (54 - 102)  BP: 134/84 (05 Mar 2021 17:00) (104/87 - 155/96)  BP(mean): 95 (05 Mar 2021 17:00) (87 - 105)  RR: 17 (05 Mar 2021 17:31) (15 - 24)  SpO2: 98% (05 Mar 2021 17:31) (90% - 99%)    PHYSICAL EXAM:  GENERAL: NAD, HEAD:  Atraumatic, Normocephalic  EYES: EOMI, PERRLA, conjunctiva and sclera clear  ENMT: No tonsillar erythema, exudates, or enlargement; Moist mucous membranes, Good dentition, No lesions  NECK: Supple, No JVD, Normal thyroid  NERVOUS SYSTEM:  asleep CHEST/LUNG: Clear to percussion bilaterally; No rales, rhonchi, wheezing, or rubs  HEART: Regular rate and rhythm; No murmurs, rubs, or gallops  ABDOMEN: Soft, Nontender, Nondistended; Bowel sounds present  EXTREMITIES:  2+ Peripheral Pulses, No clubbing, cyanosis, or edema  LYMPH: No lymphadenopathy noted  SKIN: No rashes or lesions    LABS:    03-04    148<H>  |  117<H>  |  17  ----------------------------<  94  4.0   |  23  |  1.18    Ca    8.1<L>      04 Mar 2021 08:08          CAPILLARY BLOOD GLUCOSE      POCT Blood Glucose.: 92 mg/dL (05 Mar 2021 05:40)  POCT Blood Glucose.: 94 mg/dL (05 Mar 2021 03:53)      RADIOLOGY & ADDITIONAL TESTS:    Imaging Personally Reviewed:  [ X] YES  [ ] NO    Consultant(s) Notes Reviewed:  [X ] YES  [ ] NO    Care Discussed with Consultants/Other Providers [X ] YES  [ ] NO

## 2021-03-05 NOTE — DIETITIAN INITIAL EVALUATION ADULT. - PHYSCIAL ASSESSMENT
BMI=25.4(03/05/21), 03/05, 2+ edema of wrists, arms and hands noted, ? dry wt./overweight/other (specify)

## 2021-03-05 NOTE — PROGRESS NOTE ADULT - SUBJECTIVE AND OBJECTIVE BOX
Transfer Note    Transfer from:  2C    Transfer to: (  ) Medicine    (  ) Telemetry    (  ) RCU                               (  ) Palliative    (  ) Stroke Unit    (  ) MICU    ( x ) CCU      ----------------------------------------------------------------------------------------------------------  HPI / ICU COURSE:  56yo M with longstanding hx of EtOH abuse, with frequent hospitalizations for treatment of withdrawal. Admitted on 3/3 for nausea and vomiting, with ROSA.   Called by medicine PA to see pt after 2 RRT for agitation 2/2 EtOH withdrawal last ;night. Patient was seen by ICU last night albeit remained on telemetry. Now patient continues to be agitated  pulling at lines and calling out. Patient received phenobarb 560mg. Pt seen and examined at bedside; currently disoriented but redirectable, not agitated. Vitals stable, pt in NAD, Physical exam benign. Labs reviewed, wnl; lactic acidosis and ROSA resolved.   Spoke to medicine PA and marisu Dr. Lofton . Recommend Precedex drip and transfer to ICU for closer monitoring       --------------------------------------------------------------------------------------------------------  PMH/PSH:  PAST MEDICAL & SURGICAL HISTORY:  DTs (delirium tremens)  Substance abuse  Gastritis  EtOH dependence  Mixed hyperlipidemia  PUD (peptic ulcer disease)  No significant past surgical history        -------------------------------------------------------------------------------------------------------  PHYSICAL EXAM:  General: NAD, agitated   HEENT: Atraumatic  Respiratory: CTAB  Cardiac: RRR S1 S2   Abdomen: soft, non-tender, non-distended  Extremities: warm and well-perfused + tremors   Neuro: A+O 2 .     Vital Signs Last 24 Hrs  T(C): 36.8 (05 Mar 2021 05:14), Max: 36.8 (04 Mar 2021 15:45)  T(F): 98.2 (05 Mar 2021 05:14), Max: 98.3 (04 Mar 2021 15:45)  HR: 58 (05 Mar 2021 05:14) (52 - 76)  BP: 155/96 (05 Mar 2021 05:14) (136/89 - 155/96)  RR: 18 (05 Mar 2021 05:14) (16 - 18)  SpO2: 99% (05 Mar 2021 05:14) (98% - 99%)    I&O's Summary    04 Mar 2021 07:01  -  05 Mar 2021 07:00  --------------------------------------------------------  IN: 400 mL / OUT: 0 mL / NET: 400 mL    --------------------------------------------------------------------------------------------------------  LABS:   CARDIAC MARKERS ( 03 Mar 2021 10:42 )  .034 ng/mL / x     / 239 U/L / x     / x                             14.8   7.80  )-----------( 386      ( 03 Mar 2021 10:42 )             47.3       03-04    148<H>  |  117<H>  |  17  ----------------------------<  94  4.0   |  23  |  1.18    Ca    8.1<L>      04 Mar 2021 08:08    TPro  9.6<H>  /  Alb  4.5  /  TBili  0.3  /  DBili  x   /  AST  31  /  ALT  35  /  AlkPhos  62  03-03      PT/INR - ( 03 Mar 2021 10:54 )   PT: 11.7 sec;   INR: 1.01 ratio        -------------------------------------------------------------------------------------------------  RADIOLOGY:  < from: Xray Chest 1 View- PORTABLE-Urgent (Xray Chest 1 View- PORTABLE-Urgent .) (03.03.21 @ 11:36) >  INTERPRETATION:  AP chest on March 3, 2021 at 10:37 AM. Patient has abdominal pain and vomiting.    Heart magnified by technique.    The lung fields and pleural surfaces are unremarkable.    Chest is similar to January 7 of this year.    IMPRESSION: No acute finding or change.    < end of copied text >        ---------------------------------------------------------------------------------------------------  ASSESSMENT & PLAN:     ETOH withdrawal -  Delirium tremors   1- will transfer to ICU - start Precedex gtt  2- continue phenobarb standing and prn   3 - folic acid and thiamin, and MVI  4 - advance diet as tolerated   5- monitor QTc   6- follow up labs   7- psych consult

## 2021-03-06 LAB
ALBUMIN SERPL ELPH-MCNC: 3.4 G/DL — SIGNIFICANT CHANGE UP (ref 3.3–5)
ALP SERPL-CCNC: 50 U/L — SIGNIFICANT CHANGE UP (ref 40–120)
ALT FLD-CCNC: 35 U/L — SIGNIFICANT CHANGE UP (ref 12–78)
ANION GAP SERPL CALC-SCNC: 5 MMOL/L — SIGNIFICANT CHANGE UP (ref 5–17)
AST SERPL-CCNC: 38 U/L — HIGH (ref 15–37)
BILIRUB SERPL-MCNC: 1 MG/DL — SIGNIFICANT CHANGE UP (ref 0.2–1.2)
BUN SERPL-MCNC: 12 MG/DL — SIGNIFICANT CHANGE UP (ref 7–23)
CALCIUM SERPL-MCNC: 8.7 MG/DL — SIGNIFICANT CHANGE UP (ref 8.5–10.1)
CHLORIDE SERPL-SCNC: 111 MMOL/L — HIGH (ref 96–108)
CO2 SERPL-SCNC: 27 MMOL/L — SIGNIFICANT CHANGE UP (ref 22–31)
CREAT SERPL-MCNC: 1.04 MG/DL — SIGNIFICANT CHANGE UP (ref 0.5–1.3)
GLUCOSE SERPL-MCNC: 97 MG/DL — SIGNIFICANT CHANGE UP (ref 70–99)
HCT VFR BLD CALC: 36.1 % — LOW (ref 39–50)
HGB BLD-MCNC: 11.5 G/DL — LOW (ref 13–17)
MAGNESIUM SERPL-MCNC: 2.5 MG/DL — SIGNIFICANT CHANGE UP (ref 1.6–2.6)
MCHC RBC-ENTMCNC: 25.7 PG — LOW (ref 27–34)
MCHC RBC-ENTMCNC: 31.9 GM/DL — LOW (ref 32–36)
MCV RBC AUTO: 80.8 FL — SIGNIFICANT CHANGE UP (ref 80–100)
NRBC # BLD: 0 /100 WBCS — SIGNIFICANT CHANGE UP (ref 0–0)
PHOSPHATE SERPL-MCNC: 3.3 MG/DL — SIGNIFICANT CHANGE UP (ref 2.5–4.5)
PLATELET # BLD AUTO: 213 K/UL — SIGNIFICANT CHANGE UP (ref 150–400)
POTASSIUM SERPL-MCNC: 3.7 MMOL/L — SIGNIFICANT CHANGE UP (ref 3.5–5.3)
POTASSIUM SERPL-SCNC: 3.7 MMOL/L — SIGNIFICANT CHANGE UP (ref 3.5–5.3)
PROT SERPL-MCNC: 7.2 GM/DL — SIGNIFICANT CHANGE UP (ref 6–8.3)
RBC # BLD: 4.47 M/UL — SIGNIFICANT CHANGE UP (ref 4.2–5.8)
RBC # FLD: 15.9 % — HIGH (ref 10.3–14.5)
SODIUM SERPL-SCNC: 143 MMOL/L — SIGNIFICANT CHANGE UP (ref 135–145)
WBC # BLD: 3.63 K/UL — LOW (ref 3.8–10.5)
WBC # FLD AUTO: 3.63 K/UL — LOW (ref 3.8–10.5)

## 2021-03-06 PROCEDURE — 99233 SBSQ HOSP IP/OBS HIGH 50: CPT

## 2021-03-06 PROCEDURE — 99291 CRITICAL CARE FIRST HOUR: CPT

## 2021-03-06 RX ORDER — SODIUM CHLORIDE 9 MG/ML
1000 INJECTION, SOLUTION INTRAVENOUS ONCE
Refills: 0 | Status: COMPLETED | OUTPATIENT
Start: 2021-03-06 | End: 2021-03-06

## 2021-03-06 RX ORDER — PHENOBARBITAL 60 MG
130 TABLET ORAL ONCE
Refills: 0 | Status: DISCONTINUED | OUTPATIENT
Start: 2021-03-06 | End: 2021-03-06

## 2021-03-06 RX ADMIN — DEXMEDETOMIDINE HYDROCHLORIDE IN 0.9% SODIUM CHLORIDE 13.2 MICROGRAM(S)/KG/HR: 4 INJECTION INTRAVENOUS at 17:50

## 2021-03-06 RX ADMIN — DEXMEDETOMIDINE HYDROCHLORIDE IN 0.9% SODIUM CHLORIDE 13.2 MICROGRAM(S)/KG/HR: 4 INJECTION INTRAVENOUS at 21:39

## 2021-03-06 RX ADMIN — CHLORHEXIDINE GLUCONATE 1 APPLICATION(S): 213 SOLUTION TOPICAL at 06:10

## 2021-03-06 RX ADMIN — Medication 130 MILLIGRAM(S): at 10:24

## 2021-03-06 RX ADMIN — Medication 130 MILLIGRAM(S): at 06:10

## 2021-03-06 RX ADMIN — PANTOPRAZOLE SODIUM 40 MILLIGRAM(S): 20 TABLET, DELAYED RELEASE ORAL at 10:13

## 2021-03-06 RX ADMIN — DEXMEDETOMIDINE HYDROCHLORIDE IN 0.9% SODIUM CHLORIDE 13.2 MICROGRAM(S)/KG/HR: 4 INJECTION INTRAVENOUS at 14:23

## 2021-03-06 RX ADMIN — DEXMEDETOMIDINE HYDROCHLORIDE IN 0.9% SODIUM CHLORIDE 13.2 MICROGRAM(S)/KG/HR: 4 INJECTION INTRAVENOUS at 04:43

## 2021-03-06 RX ADMIN — DEXMEDETOMIDINE HYDROCHLORIDE IN 0.9% SODIUM CHLORIDE 13.2 MICROGRAM(S)/KG/HR: 4 INJECTION INTRAVENOUS at 10:05

## 2021-03-06 RX ADMIN — Medication 130 MILLIGRAM(S): at 21:09

## 2021-03-06 RX ADMIN — Medication 100 MILLIGRAM(S): at 12:40

## 2021-03-06 RX ADMIN — ENOXAPARIN SODIUM 40 MILLIGRAM(S): 100 INJECTION SUBCUTANEOUS at 12:36

## 2021-03-06 RX ADMIN — Medication 130 MILLIGRAM(S): at 12:36

## 2021-03-06 RX ADMIN — Medication 130 MILLIGRAM(S): at 17:50

## 2021-03-06 RX ADMIN — Medication 1 MILLIGRAM(S): at 12:37

## 2021-03-06 RX ADMIN — SODIUM CHLORIDE 1000 MILLILITER(S): 9 INJECTION, SOLUTION INTRAVENOUS at 21:29

## 2021-03-06 NOTE — PROGRESS NOTE ADULT - SUBJECTIVE AND OBJECTIVE BOX
CHIEF COMPLAINT:    Interval Events:    REVIEW OF SYSTEMS:  Constitutional: [ ] fevers [ ] chills [ ] weight loss [ ] weight gain  HEENT: [ ] dry eyes [ ] eye irritation [ ] postnasal drip [ ] nasal congestion  CV: [ ] chest pain [ ] orthopnea [ ] palpitations [ ] murmur  Resp: [ ] cough [ ] shortness of breath [ ] dyspnea [ ] wheezing [ ] sputum [ ] hemoptysis  GI: [ ] nausea [ ] vomiting [ ] diarrhea [ ] constipation [ ] abd pain [ ] dysphagia   : [ ] dysuria [ ] nocturia [ ] hematuria [ ] increased urinary frequency  Musculoskeletal: [ ] back pain [ ] myalgias [ ] arthralgias [ ] fracture  Skin: [ ] rash [ ] itch  Neurological: [ ] headache [ ] dizziness [ ] syncope [ ] weakness [ ] numbness  Hematologic/Lymphatic: [ ] anemia [ ] bleeding problem  Allergic/Immunologic: [ ] itchy eyes [ ] nasal discharge [ ] hives [ ] angioedema  [ ] All other systems negative  [ ] Unable to assess ROS because ________    OBJECTIVE:  ICU Vital Signs Last 24 Hrs  T(C): 36.1 (06 Mar 2021 04:06), Max: 37 (05 Mar 2021 08:45)  T(F): 97 (06 Mar 2021 04:06), Max: 98.6 (05 Mar 2021 08:45)  HR: 74 (06 Mar 2021 07:00) (54 - 102)  BP: 129/81 (06 Mar 2021 07:00) (104/87 - 146/87)  BP(mean): 94 (06 Mar 2021 07:00) (76 - 105)  ABP: --  ABP(mean): --  RR: 18 (06 Mar 2021 07:00) (12 - 24)  SpO2: 96% (06 Mar 2021 07:00) (90% - 100%)        03-05 @ 07:01  -  03-06 @ 07:00  --------------------------------------------------------  IN: 220.8 mL / OUT: 750 mL / NET: -529.2 mL      CAPILLARY BLOOD GLUCOSE      POCT Blood Glucose.: 92 mg/dL (05 Mar 2021 05:40)      PHYSICAL EXAM:  General:   HEENT:   Neck:   Respiratory:   Cardiovascular:   Abdomen:   Extremities:   Skin:   Neurological:  Psychiatry:    LINES:    HOSPITAL MEDICATIONS:  MEDICATIONS  (STANDING):  atorvastatin 20 milliGRAM(s) Oral at bedtime  chlorhexidine 4% Liquid 1 Application(s) Topical <User Schedule>  dexMEDEtomidine Infusion 0.7 MICROgram(s)/kG/Hr (13.2 mL/Hr) IV Continuous <Continuous>  enoxaparin Injectable 40 milliGRAM(s) SubCutaneous daily  folic acid 1 milliGRAM(s) Oral daily  metoprolol tartrate 25 milliGRAM(s) Oral two times a day  pantoprazole    Tablet 40 milliGRAM(s) Oral before breakfast  PHENobarbital Injectable 130 milliGRAM(s) IV Push every 6 hours  thiamine 100 milliGRAM(s) Oral daily    MEDICATIONS  (PRN):  ondansetron Injectable 4 milliGRAM(s) IV Push every 6 hours PRN Nausea and/or Vomiting  PHENobarbital Injectable 130 milliGRAM(s) IV Push every 2 hours PRN ciwa >8      LABS:                        11.5   3.63  )-----------( 213      ( 06 Mar 2021 02:29 )             36.1     Hgb Trend: 11.5<--, 14.8<--  03-06    143  |  111<H>  |  12  ----------------------------<  97  3.7   |  27  |  1.04    Ca    8.7      06 Mar 2021 02:29  Phos  3.3     03-06  Mg     2.5     03-06    TPro  7.2  /  Alb  3.4  /  TBili  1.0  /  DBili  x   /  AST  38<H>  /  ALT  35  /  AlkPhos  50  03-06              MICROBIOLOGY:     RADIOLOGY:  [ ] Reviewed and interpreted by me CHIEF COMPLAINT:    Interval Events:  received    REVIEW OF SYSTEMS:  Constitutional: [ ] fevers [ ] chills [ ] weight loss [ ] weight gain  HEENT: [ ] dry eyes [ ] eye irritation [ ] postnasal drip [ ] nasal congestion  CV: [ ] chest pain [ ] orthopnea [ ] palpitations [ ] murmur  Resp: [ ] cough [ ] shortness of breath [ ] dyspnea [ ] wheezing [ ] sputum [ ] hemoptysis  GI: [ ] nausea [ ] vomiting [ ] diarrhea [ ] constipation [ ] abd pain [ ] dysphagia   : [ ] dysuria [ ] nocturia [ ] hematuria [ ] increased urinary frequency  Musculoskeletal: [ ] back pain [ ] myalgias [ ] arthralgias [ ] fracture  Skin: [ ] rash [ ] itch  Neurological: [ ] headache [ ] dizziness [ ] syncope [ ] weakness [ ] numbness  Hematologic/Lymphatic: [ ] anemia [ ] bleeding problem  Allergic/Immunologic: [ ] itchy eyes [ ] nasal discharge [ ] hives [ ] angioedema  [ ] All other systems negative  [ ] Unable to assess ROS because ________    OBJECTIVE:  ICU Vital Signs Last 24 Hrs  T(C): 36.1 (06 Mar 2021 04:06), Max: 37 (05 Mar 2021 08:45)  T(F): 97 (06 Mar 2021 04:06), Max: 98.6 (05 Mar 2021 08:45)  HR: 74 (06 Mar 2021 07:00) (54 - 102)  BP: 129/81 (06 Mar 2021 07:00) (104/87 - 146/87)  BP(mean): 94 (06 Mar 2021 07:00) (76 - 105)  ABP: --  ABP(mean): --  RR: 18 (06 Mar 2021 07:00) (12 - 24)  SpO2: 96% (06 Mar 2021 07:00) (90% - 100%)        03-05 @ 07:01  -  03-06 @ 07:00  --------------------------------------------------------  IN: 220.8 mL / OUT: 750 mL / NET: -529.2 mL      CAPILLARY BLOOD GLUCOSE      POCT Blood Glucose.: 92 mg/dL (05 Mar 2021 05:40)      PHYSICAL EXAM:  General:   HEENT:   Neck:   Respiratory:   Cardiovascular:   Abdomen:   Extremities:   Skin:   Neurological:  Psychiatry:    LINES:    HOSPITAL MEDICATIONS:  MEDICATIONS  (STANDING):  atorvastatin 20 milliGRAM(s) Oral at bedtime  chlorhexidine 4% Liquid 1 Application(s) Topical <User Schedule>  dexMEDEtomidine Infusion 0.7 MICROgram(s)/kG/Hr (13.2 mL/Hr) IV Continuous <Continuous>  enoxaparin Injectable 40 milliGRAM(s) SubCutaneous daily  folic acid 1 milliGRAM(s) Oral daily  metoprolol tartrate 25 milliGRAM(s) Oral two times a day  pantoprazole    Tablet 40 milliGRAM(s) Oral before breakfast  PHENobarbital Injectable 130 milliGRAM(s) IV Push every 6 hours  thiamine 100 milliGRAM(s) Oral daily    MEDICATIONS  (PRN):  ondansetron Injectable 4 milliGRAM(s) IV Push every 6 hours PRN Nausea and/or Vomiting  PHENobarbital Injectable 130 milliGRAM(s) IV Push every 2 hours PRN ciwa >8      LABS:                        11.5   3.63  )-----------( 213      ( 06 Mar 2021 02:29 )             36.1     Hgb Trend: 11.5<--, 14.8<--  03-06    143  |  111<H>  |  12  ----------------------------<  97  3.7   |  27  |  1.04    Ca    8.7      06 Mar 2021 02:29  Phos  3.3     03-06  Mg     2.5     03-06    TPro  7.2  /  Alb  3.4  /  TBili  1.0  /  DBili  x   /  AST  38<H>  /  ALT  35  /  AlkPhos  50  03-06              MICROBIOLOGY:     RADIOLOGY:  [ ] Reviewed and interpreted by me CHIEF COMPLAINT:    Interval Events:  received 1 extra phenobarbital o/n and then 1 additional dose this morning    REVIEW OF SYSTEMS:  [ x] Unable to assess ROS because sedated    OBJECTIVE:  ICU Vital Signs Last 24 Hrs  T(C): 36.1 (06 Mar 2021 04:06), Max: 37 (05 Mar 2021 08:45)  T(F): 97 (06 Mar 2021 04:06), Max: 98.6 (05 Mar 2021 08:45)  HR: 74 (06 Mar 2021 07:00) (54 - 102)  BP: 129/81 (06 Mar 2021 07:00) (104/87 - 146/87)  BP(mean): 94 (06 Mar 2021 07:00) (76 - 105)  ABP: --  ABP(mean): --  RR: 18 (06 Mar 2021 07:00) (12 - 24)  SpO2: 96% (06 Mar 2021 07:00) (90% - 100%)        03-05 @ 07:01  -  03-06 @ 07:00  --------------------------------------------------------  IN: 220.8 mL / OUT: 750 mL / NET: -529.2 mL      CAPILLARY BLOOD GLUCOSE      POCT Blood Glucose.: 92 mg/dL (05 Mar 2021 05:40)      PHYSICAL EXAM:  General: NAD, non-toxic appearing  HEENT: MMM, EOMI  Neck: supple  Respiratory: CTA b/l  Cardiovascular: s1s2 RRR  Abdomen: soft, non distended, non tender  Extremities: warm, no edema or clubbing  Skin: intact  Neurological: moves all extremities; no focal deficits  Psychiatry: sleepy/sedated     LINES:    HOSPITAL MEDICATIONS:  MEDICATIONS  (STANDING):  atorvastatin 20 milliGRAM(s) Oral at bedtime  chlorhexidine 4% Liquid 1 Application(s) Topical <User Schedule>  dexMEDEtomidine Infusion 0.7 MICROgram(s)/kG/Hr (13.2 mL/Hr) IV Continuous <Continuous>  enoxaparin Injectable 40 milliGRAM(s) SubCutaneous daily  folic acid 1 milliGRAM(s) Oral daily  metoprolol tartrate 25 milliGRAM(s) Oral two times a day  pantoprazole    Tablet 40 milliGRAM(s) Oral before breakfast  PHENobarbital Injectable 130 milliGRAM(s) IV Push every 6 hours  thiamine 100 milliGRAM(s) Oral daily    MEDICATIONS  (PRN):  ondansetron Injectable 4 milliGRAM(s) IV Push every 6 hours PRN Nausea and/or Vomiting  PHENobarbital Injectable 130 milliGRAM(s) IV Push every 2 hours PRN ciwa >8      LABS:                        11.5   3.63  )-----------( 213      ( 06 Mar 2021 02:29 )             36.1     Hgb Trend: 11.5<--, 14.8<--  03-06    143  |  111<H>  |  12  ----------------------------<  97  3.7   |  27  |  1.04    Ca    8.7      06 Mar 2021 02:29  Phos  3.3     03-06  Mg     2.5     03-06    TPro  7.2  /  Alb  3.4  /  TBili  1.0  /  DBili  x   /  AST  38<H>  /  ALT  35  /  AlkPhos  50  03-06              MICROBIOLOGY:     RADIOLOGY:  [ ] Reviewed and interpreted by me

## 2021-03-06 NOTE — PROGRESS NOTE ADULT - SUBJECTIVE AND OBJECTIVE BOX
HPI:  55 year old male admitted with nausea and vomiting for past few days. Long history of ETOH abuse and DT. Denies ETOH use for past  24 hour. No CP, fevers, HA, abdominal pain, melena, BRBPR or hematuria.     On arrival had ROSA from dehydration.    (03 Mar 2021 16:37)    Patient is a 55y old  Male who presents with a chief complaint of ETOH withdrawal and ROSA from dehydration. (06 Mar 2021 07:50)      INTERVAL HPI/OVERNIGHT EVENTS: remains in ICU     MEDICATIONS  (STANDING):  atorvastatin 20 milliGRAM(s) Oral at bedtime  chlorhexidine 4% Liquid 1 Application(s) Topical <User Schedule>  dexMEDEtomidine Infusion 0.7 MICROgram(s)/kG/Hr (13.2 mL/Hr) IV Continuous <Continuous>  enoxaparin Injectable 40 milliGRAM(s) SubCutaneous daily  folic acid 1 milliGRAM(s) Oral daily  metoprolol tartrate 25 milliGRAM(s) Oral two times a day  pantoprazole    Tablet 40 milliGRAM(s) Oral before breakfast  PHENobarbital Injectable 130 milliGRAM(s) IV Push every 6 hours  PHENobarbital Injectable 130 milliGRAM(s) IV Push once  thiamine 100 milliGRAM(s) Oral daily    MEDICATIONS  (PRN):  ondansetron Injectable 4 milliGRAM(s) IV Push every 6 hours PRN Nausea and/or Vomiting  PHENobarbital Injectable 130 milliGRAM(s) IV Push every 2 hours PRN ciwa >8      Allergies    No Known Allergies    Intolerances        REVIEW OF SYSTEMS:  Asleep unable to provide     Vital Signs Last 24 Hrs  T(C): 37.2 (06 Mar 2021 08:00), Max: 37.2 (06 Mar 2021 08:00)  T(F): 98.9 (06 Mar 2021 08:00), Max: 98.9 (06 Mar 2021 08:00)  HR: 100 (06 Mar 2021 11:30) (54 - 119)  BP: 86/55 (06 Mar 2021 11:30) (86/55 - 146/87)  BP(mean): 61 (06 Mar 2021 11:30) (57 - 103)  RR: 21 (06 Mar 2021 11:30) (12 - 22)  SpO2: 95% (06 Mar 2021 11:30) (91% - 100%)    PHYSICAL EXAM:  GENERAL: NAD, well-groomed, well-developed  HEAD:  Atraumatic, Normocephalic  EYES: EOMI, PERRLA, conjunctiva and sclera clear  ENMT: No tonsillar erythema, exudates, or enlargement; Moist mucous membranes, Good dentition, No lesions  NECK: Supple, No JVD, Normal thyroid  NERVOUS SYSTEM:  Alert & Oriented X3, Good concentration; Motor Strength 5/5 B/L upper and lower extremities; DTRs 2+ intact and symmetric  CHEST/LUNG: Clear to percussion bilaterally; No rales, rhonchi, wheezing, or rubs  HEART: Regular rate and rhythm; No murmurs, rubs, or gallops  ABDOMEN: Soft, Nontender, Nondistended; Bowel sounds present  EXTREMITIES:  2+ Peripheral Pulses, No clubbing, cyanosis, or edema  LYMPH: No lymphadenopathy noted  SKIN: No rashes or lesions    LABS:                        11.5   3.63  )-----------( 213      ( 06 Mar 2021 02:29 )             36.1     03-06    143  |  111<H>  |  12  ----------------------------<  97  3.7   |  27  |  1.04    Ca    8.7      06 Mar 2021 02:29  Phos  3.3     03-06  Mg     2.5     03-06    TPro  7.2  /  Alb  3.4  /  TBili  1.0  /  DBili  x   /  AST  38<H>  /  ALT  35  /  AlkPhos  50  03-06        CAPILLARY BLOOD GLUCOSE          RADIOLOGY & ADDITIONAL TESTS:  < from: IR Procedure (03.05.21 @ 12:52) >  EXAM:  IR PROCEDURE NON PICC                          EXAM:  SP US GUID VASC ACCESS                            PROCEDURE DATE:  03/05/2021          INTERPRETATION:  PROCEDURE: Ultrasound guided midline catheter placement.    : PAULA Vizcaino    CLINICAL INDICATION: 55-year-old male in alcohol withdrawal, requiring IV access. Midline placement was requested.    ANESTHESIA: 1% lidocaine was used for local anesthesia.    TECHNIQUE:  The patient remained supine on the stretcher and connectedto physiologic monitoring. Ultrasound examination of the left arm was performed, which demonstrated a patent basilic vein. Ultrasound images were captured and transmitted to PACS. The left upper arm was prepped and draped in usual sterile fashion. Using sonographic guidance, the left basilic vein was accessed with a 21 gauge micropuncture needle, which was then exchanged for a 4.5 [place] peel-away sheath over a Mandril wire. A 4 [place] single-lumen midline catheter (cut to 20 cm) was advanced through the peel-away sheath. The peel-away sheath was then removed. The midline catheter aspirated and flushed freely and was then locked. The catheter was secured to skin. A dry sterile dressing was then applied.    The patient tolerated the procedure well and was transferred from the radiology department in stable condition. There were no immediate complications.    IMPRESSION: SUCCESSFUL ULTRASOUND GUIDED MIDLINE CATHETER PLACEMENT VIA LEFT BASILIC VEIN. THE CATHETER ASPIRATED AND FLUSHED FREELY UPON COMPLETION OF THE PROCEDURE.    < end of copied text >    Imaging Personally Reviewed:  [X ] YES  [ ] NO    Consultant(s) Notes Reviewed:  [X ] YES  [ ] NO    Care Discussed with Consultants/Other Providers [ X] YES  [ ] NO

## 2021-03-06 NOTE — CHART NOTE - NSCHARTNOTEFT_GEN_A_CORE
phenobarb 130mg IVP x1 additional dose ordered at 1025 NOT given. Ordered modified by pharmacist Jessica Bull. ( complete task pending). Unable to DC order in computer.     DESTINI AndrewsC

## 2021-03-06 NOTE — PROGRESS NOTE ADULT - ASSESSMENT
55M w/ EtOH use disorder. Admitted w/ EtOh withdrawal, transferred to ICU for sever symptoms/DTs.     #Neuro - remains calm on phenobarb and precedex; can give additional phenobarb PRN  #CV - HD stable  #Pulm - not requiring O2; EtCO2 monitoring  #ID- no active issues  #Renal/metabolic - admitted w/ ROSA, that has now resolved; electrolytes and UOP acceptable  #GI- NPO while very sedated  #Heme - no active issues  #Endo - BG has been acceptable  #PPx - lovenox  #Dispo- remains in ICU on precedex for severe agitation

## 2021-03-07 LAB
ALBUMIN SERPL ELPH-MCNC: 3 G/DL — LOW (ref 3.3–5)
ALP SERPL-CCNC: 49 U/L — SIGNIFICANT CHANGE UP (ref 40–120)
ALT FLD-CCNC: 26 U/L — SIGNIFICANT CHANGE UP (ref 12–78)
ANION GAP SERPL CALC-SCNC: 8 MMOL/L — SIGNIFICANT CHANGE UP (ref 5–17)
AST SERPL-CCNC: 19 U/L — SIGNIFICANT CHANGE UP (ref 15–37)
BILIRUB SERPL-MCNC: 0.9 MG/DL — SIGNIFICANT CHANGE UP (ref 0.2–1.2)
BUN SERPL-MCNC: 15 MG/DL — SIGNIFICANT CHANGE UP (ref 7–23)
CALCIUM SERPL-MCNC: 8.5 MG/DL — SIGNIFICANT CHANGE UP (ref 8.5–10.1)
CHLORIDE SERPL-SCNC: 111 MMOL/L — HIGH (ref 96–108)
CO2 SERPL-SCNC: 24 MMOL/L — SIGNIFICANT CHANGE UP (ref 22–31)
CREAT SERPL-MCNC: 0.9 MG/DL — SIGNIFICANT CHANGE UP (ref 0.5–1.3)
GLUCOSE SERPL-MCNC: 91 MG/DL — SIGNIFICANT CHANGE UP (ref 70–99)
HCT VFR BLD CALC: 36.7 % — LOW (ref 39–50)
HGB BLD-MCNC: 11.6 G/DL — LOW (ref 13–17)
MAGNESIUM SERPL-MCNC: 2.1 MG/DL — SIGNIFICANT CHANGE UP (ref 1.6–2.6)
MCHC RBC-ENTMCNC: 25.4 PG — LOW (ref 27–34)
MCHC RBC-ENTMCNC: 31.6 GM/DL — LOW (ref 32–36)
MCV RBC AUTO: 80.5 FL — SIGNIFICANT CHANGE UP (ref 80–100)
NRBC # BLD: 0 /100 WBCS — SIGNIFICANT CHANGE UP (ref 0–0)
PHOSPHATE SERPL-MCNC: 3.4 MG/DL — SIGNIFICANT CHANGE UP (ref 2.5–4.5)
PLATELET # BLD AUTO: 218 K/UL — SIGNIFICANT CHANGE UP (ref 150–400)
POTASSIUM SERPL-MCNC: 3.2 MMOL/L — LOW (ref 3.5–5.3)
POTASSIUM SERPL-SCNC: 3.2 MMOL/L — LOW (ref 3.5–5.3)
PROT SERPL-MCNC: 7.1 GM/DL — SIGNIFICANT CHANGE UP (ref 6–8.3)
RBC # BLD: 4.56 M/UL — SIGNIFICANT CHANGE UP (ref 4.2–5.8)
RBC # FLD: 15.9 % — HIGH (ref 10.3–14.5)
SODIUM SERPL-SCNC: 143 MMOL/L — SIGNIFICANT CHANGE UP (ref 135–145)
WBC # BLD: 7.87 K/UL — SIGNIFICANT CHANGE UP (ref 3.8–10.5)
WBC # FLD AUTO: 7.87 K/UL — SIGNIFICANT CHANGE UP (ref 3.8–10.5)

## 2021-03-07 PROCEDURE — 99233 SBSQ HOSP IP/OBS HIGH 50: CPT

## 2021-03-07 PROCEDURE — 99291 CRITICAL CARE FIRST HOUR: CPT

## 2021-03-07 RX ORDER — POTASSIUM CHLORIDE 20 MEQ
40 PACKET (EA) ORAL EVERY 4 HOURS
Refills: 0 | Status: COMPLETED | OUTPATIENT
Start: 2021-03-07 | End: 2021-03-07

## 2021-03-07 RX ADMIN — Medication 40 MILLIEQUIVALENT(S): at 10:07

## 2021-03-07 RX ADMIN — Medication 130 MILLIGRAM(S): at 06:42

## 2021-03-07 RX ADMIN — DEXMEDETOMIDINE HYDROCHLORIDE IN 0.9% SODIUM CHLORIDE 13.2 MICROGRAM(S)/KG/HR: 4 INJECTION INTRAVENOUS at 23:22

## 2021-03-07 RX ADMIN — DEXMEDETOMIDINE HYDROCHLORIDE IN 0.9% SODIUM CHLORIDE 13.2 MICROGRAM(S)/KG/HR: 4 INJECTION INTRAVENOUS at 13:57

## 2021-03-07 RX ADMIN — Medication 130 MILLIGRAM(S): at 17:22

## 2021-03-07 RX ADMIN — Medication 100 MILLIGRAM(S): at 11:59

## 2021-03-07 RX ADMIN — CHLORHEXIDINE GLUCONATE 1 APPLICATION(S): 213 SOLUTION TOPICAL at 06:42

## 2021-03-07 RX ADMIN — Medication 130 MILLIGRAM(S): at 11:59

## 2021-03-07 RX ADMIN — Medication 1 TABLET(S): at 11:59

## 2021-03-07 RX ADMIN — DEXMEDETOMIDINE HYDROCHLORIDE IN 0.9% SODIUM CHLORIDE 13.2 MICROGRAM(S)/KG/HR: 4 INJECTION INTRAVENOUS at 17:30

## 2021-03-07 RX ADMIN — Medication 130 MILLIGRAM(S): at 22:12

## 2021-03-07 RX ADMIN — ENOXAPARIN SODIUM 40 MILLIGRAM(S): 100 INJECTION SUBCUTANEOUS at 11:59

## 2021-03-07 RX ADMIN — Medication 1 MILLIGRAM(S): at 11:59

## 2021-03-07 RX ADMIN — PANTOPRAZOLE SODIUM 40 MILLIGRAM(S): 20 TABLET, DELAYED RELEASE ORAL at 07:57

## 2021-03-07 RX ADMIN — Medication 40 MILLIEQUIVALENT(S): at 06:42

## 2021-03-07 RX ADMIN — Medication 130 MILLIGRAM(S): at 02:52

## 2021-03-07 RX ADMIN — Medication 130 MILLIGRAM(S): at 00:40

## 2021-03-07 RX ADMIN — DEXMEDETOMIDINE HYDROCHLORIDE IN 0.9% SODIUM CHLORIDE 13.2 MICROGRAM(S)/KG/HR: 4 INJECTION INTRAVENOUS at 10:07

## 2021-03-07 RX ADMIN — DEXMEDETOMIDINE HYDROCHLORIDE IN 0.9% SODIUM CHLORIDE 13.2 MICROGRAM(S)/KG/HR: 4 INJECTION INTRAVENOUS at 01:51

## 2021-03-07 RX ADMIN — Medication 130 MILLIGRAM(S): at 13:24

## 2021-03-07 NOTE — PROGRESS NOTE ADULT - ASSESSMENT
55M w/ EtOH use disorder. Admitted w/ EtOh withdrawal, transferred to ICU for sever symptoms/DTs.     #Neuro - remains calm on phenobarb and precedex; can give additional phenobarb PRN  #CV - HD stable  #Pulm - not requiring O2; EtCO2 monitoring  #ID- no active issues  #Renal/metabolic - admitted w/ ROSA, that has now resolved; K repleted; has had intermittent urinary retention requiring straight catheterization, continue as needed  #GI- NPO while very sedated but can eat if awake  #Heme - no active issues  #Endo - BG has been acceptable  #PPx - lovenox  #Dispo- remains in ICU on precedex for severe agitation

## 2021-03-07 NOTE — PROGRESS NOTE ADULT - SUBJECTIVE AND OBJECTIVE BOX
CHIEF COMPLAINT:    Interval Events:    REVIEW OF SYSTEMS:  Constitutional: [ ] fevers [ ] chills [ ] weight loss [ ] weight gain  HEENT: [ ] dry eyes [ ] eye irritation [ ] postnasal drip [ ] nasal congestion  CV: [ ] chest pain [ ] orthopnea [ ] palpitations [ ] murmur  Resp: [ ] cough [ ] shortness of breath [ ] dyspnea [ ] wheezing [ ] sputum [ ] hemoptysis  GI: [ ] nausea [ ] vomiting [ ] diarrhea [ ] constipation [ ] abd pain [ ] dysphagia   : [ ] dysuria [ ] nocturia [ ] hematuria [ ] increased urinary frequency  Musculoskeletal: [ ] back pain [ ] myalgias [ ] arthralgias [ ] fracture  Skin: [ ] rash [ ] itch  Neurological: [ ] headache [ ] dizziness [ ] syncope [ ] weakness [ ] numbness  Hematologic/Lymphatic: [ ] anemia [ ] bleeding problem  Allergic/Immunologic: [ ] itchy eyes [ ] nasal discharge [ ] hives [ ] angioedema  [ ] All other systems negative  [ ] Unable to assess ROS because ________    OBJECTIVE:  ICU Vital Signs Last 24 Hrs  T(C): 36.6 (07 Mar 2021 04:28), Max: 37.2 (06 Mar 2021 08:00)  T(F): 97.9 (07 Mar 2021 04:28), Max: 99 (06 Mar 2021 16:17)  HR: 68 (07 Mar 2021 07:23) (54 - 119)  BP: 107/82 (07 Mar 2021 07:00) (81/54 - 147/81)  BP(mean): 87 (07 Mar 2021 07:00) (56 - 100)  ABP: --  ABP(mean): --  RR: 15 (07 Mar 2021 07:23) (12 - 24)  SpO2: 97% (07 Mar 2021 07:23) (91% - 99%)        03-06 @ 07:01  -  03-07 @ 07:00  --------------------------------------------------------  IN: 1765.9 mL / OUT: 1575 mL / NET: 190.9 mL      CAPILLARY BLOOD GLUCOSE          PHYSICAL EXAM:  General:   HEENT:   Neck:   Respiratory:   Cardiovascular:   Abdomen:   Extremities:   Skin:   Neurological:  Psychiatry:    LINES:    HOSPITAL MEDICATIONS:  MEDICATIONS  (STANDING):  atorvastatin 20 milliGRAM(s) Oral at bedtime  chlorhexidine 4% Liquid 1 Application(s) Topical <User Schedule>  dexMEDEtomidine Infusion 0.7 MICROgram(s)/kG/Hr (13.2 mL/Hr) IV Continuous <Continuous>  enoxaparin Injectable 40 milliGRAM(s) SubCutaneous daily  folic acid 1 milliGRAM(s) Oral daily  metoprolol tartrate 25 milliGRAM(s) Oral two times a day  pantoprazole    Tablet 40 milliGRAM(s) Oral before breakfast  PHENobarbital Injectable 130 milliGRAM(s) IV Push every 6 hours  potassium chloride   Powder 40 milliEquivalent(s) Oral every 4 hours  thiamine 100 milliGRAM(s) Oral daily    MEDICATIONS  (PRN):  ondansetron Injectable 4 milliGRAM(s) IV Push every 6 hours PRN Nausea and/or Vomiting  PHENobarbital Injectable 130 milliGRAM(s) IV Push every 2 hours PRN ciwa >8      LABS:                        11.6   7.87  )-----------( 218      ( 07 Mar 2021 02:50 )             36.7     Hgb Trend: 11.6<--, 11.5<--, 14.8<--  03-07    143  |  111<H>  |  15  ----------------------------<  91  3.2<L>   |  24  |  0.90    Ca    8.5      07 Mar 2021 02:50  Phos  3.4     03-07  Mg     2.1     03-07    TPro  7.1  /  Alb  3.0<L>  /  TBili  0.9  /  DBili  x   /  AST  19  /  ALT  26  /  AlkPhos  49  03-07              MICROBIOLOGY:     RADIOLOGY:  [ ] Reviewed and interpreted by me CHIEF COMPLAINT:    Interval Events:  received phenobarb PRN x2 overnight  received 1 L LR overnight   intermittent urinary retention requiring straight cath    REVIEW OF SYSTEMS:  [ x] Unable to assess ROS because confused    OBJECTIVE:  ICU Vital Signs Last 24 Hrs  T(C): 36.6 (07 Mar 2021 04:28), Max: 37.2 (06 Mar 2021 08:00)  T(F): 97.9 (07 Mar 2021 04:28), Max: 99 (06 Mar 2021 16:17)  HR: 68 (07 Mar 2021 07:23) (54 - 119)  BP: 107/82 (07 Mar 2021 07:00) (81/54 - 147/81)  BP(mean): 87 (07 Mar 2021 07:00) (56 - 100)  ABP: --  ABP(mean): --  RR: 15 (07 Mar 2021 07:23) (12 - 24)  SpO2: 97% (07 Mar 2021 07:23) (91% - 99%)        03-06 @ 07:01  -  03-07 @ 07:00  --------------------------------------------------------  IN: 1765.9 mL / OUT: 1575 mL / NET: 190.9 mL      CAPILLARY BLOOD GLUCOSE          PHYSICAL EXAM:  General: NAD, non-toxic appearing  HEENT: MMM, EOMI  Neck: supple  Respiratory: CTA b/l  Cardiovascular: s1s2 RRR  Abdomen: soft, non distended, non tender  Extremities: warm, no edema or clubbing  Skin: intact  Neurological: moves all extremities; no focal deficits  Psychiatry: sleepy/sedated     LINES:    HOSPITAL MEDICATIONS:  MEDICATIONS  (STANDING):  atorvastatin 20 milliGRAM(s) Oral at bedtime  chlorhexidine 4% Liquid 1 Application(s) Topical <User Schedule>  dexMEDEtomidine Infusion 0.7 MICROgram(s)/kG/Hr (13.2 mL/Hr) IV Continuous <Continuous>  enoxaparin Injectable 40 milliGRAM(s) SubCutaneous daily  folic acid 1 milliGRAM(s) Oral daily  metoprolol tartrate 25 milliGRAM(s) Oral two times a day  pantoprazole    Tablet 40 milliGRAM(s) Oral before breakfast  PHENobarbital Injectable 130 milliGRAM(s) IV Push every 6 hours  potassium chloride   Powder 40 milliEquivalent(s) Oral every 4 hours  thiamine 100 milliGRAM(s) Oral daily    MEDICATIONS  (PRN):  ondansetron Injectable 4 milliGRAM(s) IV Push every 6 hours PRN Nausea and/or Vomiting  PHENobarbital Injectable 130 milliGRAM(s) IV Push every 2 hours PRN ciwa >8      LABS:                        11.6   7.87  )-----------( 218      ( 07 Mar 2021 02:50 )             36.7     Hgb Trend: 11.6<--, 11.5<--, 14.8<--  03-07    143  |  111<H>  |  15  ----------------------------<  91  3.2<L>   |  24  |  0.90    Ca    8.5      07 Mar 2021 02:50  Phos  3.4     03-07  Mg     2.1     03-07    TPro  7.1  /  Alb  3.0<L>  /  TBili  0.9  /  DBili  x   /  AST  19  /  ALT  26  /  AlkPhos  49  03-07              MICROBIOLOGY:     RADIOLOGY:  [ ] Reviewed and interpreted by me

## 2021-03-07 NOTE — PROGRESS NOTE ADULT - SUBJECTIVE AND OBJECTIVE BOX
HPI:  55 year old male admitted with nausea and vomiting for past few days. Long history of ETOH abuse and DT. Denies ETOH use for past  24 hour. No CP, fevers, HA, abdominal pain, melena, BRBPR or hematuria.     On arrival had ROSA from dehydration.    (03 Mar 2021 16:37)  Patient is a 55y old  Male who presents with a chief complaint of ETOH withdrawal and ROSA from dehydration. (07 Mar 2021 07:49)      INTERVAL HPI/OVERNIGHT EVENTS: no acute events remains in ICU     MEDICATIONS  (STANDING):  atorvastatin 20 milliGRAM(s) Oral at bedtime  chlorhexidine 4% Liquid 1 Application(s) Topical <User Schedule>  dexMEDEtomidine Infusion 0.7 MICROgram(s)/kG/Hr (13.2 mL/Hr) IV Continuous <Continuous>  enoxaparin Injectable 40 milliGRAM(s) SubCutaneous daily  folic acid 1 milliGRAM(s) Oral daily  metoprolol tartrate 25 milliGRAM(s) Oral two times a day  multivitamin 1 Tablet(s) Oral daily  pantoprazole    Tablet 40 milliGRAM(s) Oral before breakfast  PHENobarbital Injectable 130 milliGRAM(s) IV Push every 6 hours  thiamine 100 milliGRAM(s) Oral daily    MEDICATIONS  (PRN):  ondansetron Injectable 4 milliGRAM(s) IV Push every 6 hours PRN Nausea and/or Vomiting  PHENobarbital Injectable 130 milliGRAM(s) IV Push every 2 hours PRN ciwa >8      Allergies    No Known Allergies    Intolerances        REVIEW OF SYSTEMS:  sedated  Vital Signs Last 24 Hrs  T(C): 37.1 (07 Mar 2021 12:00), Max: 37.2 (06 Mar 2021 16:17)  T(F): 98.7 (07 Mar 2021 12:00), Max: 99 (06 Mar 2021 16:17)  HR: 99 (07 Mar 2021 13:00) (54 - 99)  BP: 107/85 (07 Mar 2021 13:00) (84/52 - 147/103)  BP(mean): 90 (07 Mar 2021 13:00) (56 - 110)  RR: 19 (07 Mar 2021 13:00) (10 - 24)  SpO2: 96% (07 Mar 2021 13:00) (91% - 100%)    PHYSICAL EXAM:  GENERAL: NAD, well-groomed, well-developed  HEAD:  Atraumatic, Normocephalic  EYES: EOMI, PERRLA, conjunctiva and sclera clear  ENMT: No tonsillar erythema, exudates, or enlargement; Moist mucous membranes, Good dentition, No lesions  NECK: Supple, No JVD, Normal thyroid  NERVOUS SYSTEM:  sedated  CHEST/LUNG: Clear to percussion bilaterally; No rales, rhonchi, wheezing, or rubs  HEART: Regular rate and rhythm; No murmurs, rubs, or gallops  ABDOMEN: Soft, Nontender, Nondistended; Bowel sounds present  EXTREMITIES:  2+ Peripheral Pulses, No clubbing, cyanosis, or edema  LYMPH: No lymphadenopathy noted  SKIN: No rashes or lesions    LABS:                        11.6   7.87  )-----------( 218      ( 07 Mar 2021 02:50 )             36.7     03-07    143  |  111<H>  |  15  ----------------------------<  91  3.2<L>   |  24  |  0.90    Ca    8.5      07 Mar 2021 02:50  Phos  3.4     03-07  Mg     2.1     03-07    TPro  7.1  /  Alb  3.0<L>  /  TBili  0.9  /  DBili  x   /  AST  19  /  ALT  26  /  AlkPhos  49  03-07        CAPILLARY BLOOD GLUCOSE          RADIOLOGY & ADDITIONAL TESTS:    Imaging Personally Reviewed:  [ ] YES  [ ] NO    Consultant(s) Notes Reviewed:  [ ] YES  [ ] NO    Care Discussed with Consultants/Other Providers [ ] YES  [ ] NO

## 2021-03-08 LAB
ALBUMIN SERPL ELPH-MCNC: 3.1 G/DL — LOW (ref 3.3–5)
ALP SERPL-CCNC: 46 U/L — SIGNIFICANT CHANGE UP (ref 40–120)
ALT FLD-CCNC: 25 U/L — SIGNIFICANT CHANGE UP (ref 12–78)
ANION GAP SERPL CALC-SCNC: 8 MMOL/L — SIGNIFICANT CHANGE UP (ref 5–17)
AST SERPL-CCNC: 21 U/L — SIGNIFICANT CHANGE UP (ref 15–37)
BILIRUB SERPL-MCNC: 0.5 MG/DL — SIGNIFICANT CHANGE UP (ref 0.2–1.2)
BUN SERPL-MCNC: 12 MG/DL — SIGNIFICANT CHANGE UP (ref 7–23)
CALCIUM SERPL-MCNC: 8.9 MG/DL — SIGNIFICANT CHANGE UP (ref 8.5–10.1)
CHLORIDE SERPL-SCNC: 111 MMOL/L — HIGH (ref 96–108)
CO2 SERPL-SCNC: 22 MMOL/L — SIGNIFICANT CHANGE UP (ref 22–31)
CREAT SERPL-MCNC: 0.92 MG/DL — SIGNIFICANT CHANGE UP (ref 0.5–1.3)
GLUCOSE SERPL-MCNC: 96 MG/DL — SIGNIFICANT CHANGE UP (ref 70–99)
HCT VFR BLD CALC: 35.6 % — LOW (ref 39–50)
HGB BLD-MCNC: 11.2 G/DL — LOW (ref 13–17)
MAGNESIUM SERPL-MCNC: 2.3 MG/DL — SIGNIFICANT CHANGE UP (ref 1.6–2.6)
MCHC RBC-ENTMCNC: 24.8 PG — LOW (ref 27–34)
MCHC RBC-ENTMCNC: 31.5 GM/DL — LOW (ref 32–36)
MCV RBC AUTO: 78.8 FL — LOW (ref 80–100)
NRBC # BLD: 0 /100 WBCS — SIGNIFICANT CHANGE UP (ref 0–0)
PHOSPHATE SERPL-MCNC: 3 MG/DL — SIGNIFICANT CHANGE UP (ref 2.5–4.5)
PLATELET # BLD AUTO: 225 K/UL — SIGNIFICANT CHANGE UP (ref 150–400)
POTASSIUM SERPL-MCNC: 3.6 MMOL/L — SIGNIFICANT CHANGE UP (ref 3.5–5.3)
POTASSIUM SERPL-SCNC: 3.6 MMOL/L — SIGNIFICANT CHANGE UP (ref 3.5–5.3)
PROT SERPL-MCNC: 7.4 GM/DL — SIGNIFICANT CHANGE UP (ref 6–8.3)
RBC # BLD: 4.52 M/UL — SIGNIFICANT CHANGE UP (ref 4.2–5.8)
RBC # FLD: 15.9 % — HIGH (ref 10.3–14.5)
SODIUM SERPL-SCNC: 141 MMOL/L — SIGNIFICANT CHANGE UP (ref 135–145)
WBC # BLD: 7.25 K/UL — SIGNIFICANT CHANGE UP (ref 3.8–10.5)
WBC # FLD AUTO: 7.25 K/UL — SIGNIFICANT CHANGE UP (ref 3.8–10.5)

## 2021-03-08 PROCEDURE — 99291 CRITICAL CARE FIRST HOUR: CPT

## 2021-03-08 PROCEDURE — 71045 X-RAY EXAM CHEST 1 VIEW: CPT | Mod: 26

## 2021-03-08 RX ORDER — ACETAMINOPHEN 500 MG
1000 TABLET ORAL ONCE
Refills: 0 | Status: COMPLETED | OUTPATIENT
Start: 2021-03-08 | End: 2021-03-08

## 2021-03-08 RX ORDER — SODIUM CHLORIDE 9 MG/ML
1000 INJECTION, SOLUTION INTRAVENOUS
Refills: 0 | Status: DISCONTINUED | OUTPATIENT
Start: 2021-03-08 | End: 2021-03-10

## 2021-03-08 RX ORDER — PHENOBARBITAL 60 MG
130 TABLET ORAL ONCE
Refills: 0 | Status: DISCONTINUED | OUTPATIENT
Start: 2021-03-08 | End: 2021-03-08

## 2021-03-08 RX ADMIN — Medication 130 MILLIGRAM(S): at 10:07

## 2021-03-08 RX ADMIN — DEXMEDETOMIDINE HYDROCHLORIDE IN 0.9% SODIUM CHLORIDE 13.2 MICROGRAM(S)/KG/HR: 4 INJECTION INTRAVENOUS at 11:56

## 2021-03-08 RX ADMIN — Medication 130 MILLIGRAM(S): at 01:15

## 2021-03-08 RX ADMIN — PANTOPRAZOLE SODIUM 40 MILLIGRAM(S): 20 TABLET, DELAYED RELEASE ORAL at 09:08

## 2021-03-08 RX ADMIN — DEXMEDETOMIDINE HYDROCHLORIDE IN 0.9% SODIUM CHLORIDE 13.2 MICROGRAM(S)/KG/HR: 4 INJECTION INTRAVENOUS at 02:57

## 2021-03-08 RX ADMIN — DEXMEDETOMIDINE HYDROCHLORIDE IN 0.9% SODIUM CHLORIDE 13.2 MICROGRAM(S)/KG/HR: 4 INJECTION INTRAVENOUS at 19:42

## 2021-03-08 RX ADMIN — DEXMEDETOMIDINE HYDROCHLORIDE IN 0.9% SODIUM CHLORIDE 13.2 MICROGRAM(S)/KG/HR: 4 INJECTION INTRAVENOUS at 22:37

## 2021-03-08 RX ADMIN — ENOXAPARIN SODIUM 40 MILLIGRAM(S): 100 INJECTION SUBCUTANEOUS at 11:56

## 2021-03-08 RX ADMIN — Medication 1000 MILLIGRAM(S): at 18:03

## 2021-03-08 RX ADMIN — SODIUM CHLORIDE 75 MILLILITER(S): 9 INJECTION, SOLUTION INTRAVENOUS at 19:01

## 2021-03-08 RX ADMIN — DEXMEDETOMIDINE HYDROCHLORIDE IN 0.9% SODIUM CHLORIDE 13.2 MICROGRAM(S)/KG/HR: 4 INJECTION INTRAVENOUS at 17:08

## 2021-03-08 RX ADMIN — DEXMEDETOMIDINE HYDROCHLORIDE IN 0.9% SODIUM CHLORIDE 13.2 MICROGRAM(S)/KG/HR: 4 INJECTION INTRAVENOUS at 09:32

## 2021-03-08 RX ADMIN — Medication 400 MILLIGRAM(S): at 16:07

## 2021-03-08 RX ADMIN — Medication 130 MILLIGRAM(S): at 09:30

## 2021-03-08 RX ADMIN — Medication 130 MILLIGRAM(S): at 03:27

## 2021-03-08 RX ADMIN — Medication 130 MILLIGRAM(S): at 18:04

## 2021-03-08 RX ADMIN — CHLORHEXIDINE GLUCONATE 1 APPLICATION(S): 213 SOLUTION TOPICAL at 06:38

## 2021-03-08 RX ADMIN — Medication 130 MILLIGRAM(S): at 06:38

## 2021-03-08 RX ADMIN — DEXMEDETOMIDINE HYDROCHLORIDE IN 0.9% SODIUM CHLORIDE 13.2 MICROGRAM(S)/KG/HR: 4 INJECTION INTRAVENOUS at 06:38

## 2021-03-08 RX ADMIN — Medication 130 MILLIGRAM(S): at 22:04

## 2021-03-08 RX ADMIN — DEXMEDETOMIDINE HYDROCHLORIDE IN 0.9% SODIUM CHLORIDE 13.2 MICROGRAM(S)/KG/HR: 4 INJECTION INTRAVENOUS at 14:31

## 2021-03-08 RX ADMIN — Medication 130 MILLIGRAM(S): at 12:50

## 2021-03-08 RX ADMIN — Medication 400 MILLIGRAM(S): at 22:29

## 2021-03-08 NOTE — PROGRESS NOTE ADULT - ASSESSMENT
55M w/ EtOH use disorder. Admitted w/ EtOh withdrawal, transferred to ICU for sever symptoms/DTs.     #Neuro - remains calm on phenobarb and precedex; can give additional phenobarb PRN  #CV - HD stable  #Pulm - not requiring O2; EtCO2 monitoring  #ID- no active issues  #Renal/metabolic - admitted w/ ROSA, that has now resolved; electrolytes and UOP acceptable  #GI- NPO while very sedated but can eat if awake  #Heme - no active issues  #Endo - BG has been acceptable; thiamine, folic acid and MVI  #PPx - lovenox  #Dispo- remains in ICU on precedex for severe agitation

## 2021-03-08 NOTE — PROGRESS NOTE ADULT - SUBJECTIVE AND OBJECTIVE BOX
CHIEF COMPLAINT:    Interval Events:    REVIEW OF SYSTEMS:  Constitutional: [ ] fevers [ ] chills [ ] weight loss [ ] weight gain  HEENT: [ ] dry eyes [ ] eye irritation [ ] postnasal drip [ ] nasal congestion  CV: [ ] chest pain [ ] orthopnea [ ] palpitations [ ] murmur  Resp: [ ] cough [ ] shortness of breath [ ] dyspnea [ ] wheezing [ ] sputum [ ] hemoptysis  GI: [ ] nausea [ ] vomiting [ ] diarrhea [ ] constipation [ ] abd pain [ ] dysphagia   : [ ] dysuria [ ] nocturia [ ] hematuria [ ] increased urinary frequency  Musculoskeletal: [ ] back pain [ ] myalgias [ ] arthralgias [ ] fracture  Skin: [ ] rash [ ] itch  Neurological: [ ] headache [ ] dizziness [ ] syncope [ ] weakness [ ] numbness  Hematologic/Lymphatic: [ ] anemia [ ] bleeding problem  Allergic/Immunologic: [ ] itchy eyes [ ] nasal discharge [ ] hives [ ] angioedema  [ ] All other systems negative  [ ] Unable to assess ROS because ________    OBJECTIVE:  ICU Vital Signs Last 24 Hrs  T(C): 36.6 (08 Mar 2021 05:00), Max: 37.2 (07 Mar 2021 22:30)  T(F): 97.9 (08 Mar 2021 05:00), Max: 98.9 (07 Mar 2021 22:30)  HR: 85 (08 Mar 2021 07:28) (54 - 99)  BP: 101/64 (08 Mar 2021 07:00) (101/64 - 162/96)  BP(mean): 73 (08 Mar 2021 07:00) (73 - 136)  ABP: --  ABP(mean): --  RR: 19 (08 Mar 2021 07:28) (10 - 22)  SpO2: 97% (08 Mar 2021 07:28) (93% - 100%)        03-07 @ 07:01  -  03-08 @ 07:00  --------------------------------------------------------  IN: 1115.1 mL / OUT: 775 mL / NET: 340.1 mL      CAPILLARY BLOOD GLUCOSE          PHYSICAL EXAM:  General:   HEENT:   Neck:   Respiratory:   Cardiovascular:   Abdomen:   Extremities:   Skin:   Neurological:  Psychiatry:    LINES:    HOSPITAL MEDICATIONS:  MEDICATIONS  (STANDING):  atorvastatin 20 milliGRAM(s) Oral at bedtime  chlorhexidine 4% Liquid 1 Application(s) Topical <User Schedule>  dexMEDEtomidine Infusion 0.7 MICROgram(s)/kG/Hr (13.2 mL/Hr) IV Continuous <Continuous>  enoxaparin Injectable 40 milliGRAM(s) SubCutaneous daily  folic acid 1 milliGRAM(s) Oral daily  metoprolol tartrate 25 milliGRAM(s) Oral two times a day  multivitamin 1 Tablet(s) Oral daily  pantoprazole    Tablet 40 milliGRAM(s) Oral before breakfast  PHENobarbital Injectable 130 milliGRAM(s) IV Push every 6 hours  thiamine 100 milliGRAM(s) Oral daily    MEDICATIONS  (PRN):  ondansetron Injectable 4 milliGRAM(s) IV Push every 6 hours PRN Nausea and/or Vomiting  PHENobarbital Injectable 130 milliGRAM(s) IV Push every 2 hours PRN ciwa >8      LABS:                        11.2   7.25  )-----------( 225      ( 08 Mar 2021 04:38 )             35.6     Hgb Trend: 11.2<--, 11.6<--, 11.5<--, 14.8<--  03-08    141  |  111<H>  |  12  ----------------------------<  96  3.6   |  22  |  0.92    Ca    8.9      08 Mar 2021 04:38  Phos  3.0     03-08  Mg     2.3     03-08    TPro  7.4  /  Alb  3.1<L>  /  TBili  0.5  /  DBili  x   /  AST  21  /  ALT  25  /  AlkPhos  46  03-08              MICROBIOLOGY:     RADIOLOGY:  [ ] Reviewed and interpreted by me CHIEF COMPLAINT:    Interval Events:  received 2 additional phenobarb overnight and then 1 additional this morning    REVIEW OF SYSTEMS:  [ x] Unable to assess ROS because pt is not making sense    OBJECTIVE:  ICU Vital Signs Last 24 Hrs  T(C): 36.6 (08 Mar 2021 05:00), Max: 37.2 (07 Mar 2021 22:30)  T(F): 97.9 (08 Mar 2021 05:00), Max: 98.9 (07 Mar 2021 22:30)  HR: 85 (08 Mar 2021 07:28) (54 - 99)  BP: 101/64 (08 Mar 2021 07:00) (101/64 - 162/96)  BP(mean): 73 (08 Mar 2021 07:00) (73 - 136)  ABP: --  ABP(mean): --  RR: 19 (08 Mar 2021 07:28) (10 - 22)  SpO2: 97% (08 Mar 2021 07:28) (93% - 100%)        03-07 @ 07:01  -  03-08 @ 07:00  --------------------------------------------------------  IN: 1115.1 mL / OUT: 775 mL / NET: 340.1 mL      CAPILLARY BLOOD GLUCOSE          PHYSICAL EXAM:  General: NAD, non-toxic appearing  HEENT: MMM, EOMI  Neck: supple  Respiratory: CTA b/l  Cardiovascular: s1s2 RRR  Abdomen: soft, non distended, non tender  Extremities: warm, no edema or clubbing  Skin: intact  Neurological: moves all extremities; no focal deficits  Psychiatry: intermittently sleepy/sedated and then agitated    LINES:    HOSPITAL MEDICATIONS:  MEDICATIONS  (STANDING):  atorvastatin 20 milliGRAM(s) Oral at bedtime  chlorhexidine 4% Liquid 1 Application(s) Topical <User Schedule>  dexMEDEtomidine Infusion 0.7 MICROgram(s)/kG/Hr (13.2 mL/Hr) IV Continuous <Continuous>  enoxaparin Injectable 40 milliGRAM(s) SubCutaneous daily  folic acid 1 milliGRAM(s) Oral daily  metoprolol tartrate 25 milliGRAM(s) Oral two times a day  multivitamin 1 Tablet(s) Oral daily  pantoprazole    Tablet 40 milliGRAM(s) Oral before breakfast  PHENobarbital Injectable 130 milliGRAM(s) IV Push every 6 hours  thiamine 100 milliGRAM(s) Oral daily    MEDICATIONS  (PRN):  ondansetron Injectable 4 milliGRAM(s) IV Push every 6 hours PRN Nausea and/or Vomiting  PHENobarbital Injectable 130 milliGRAM(s) IV Push every 2 hours PRN ciwa >8      LABS:                        11.2   7.25  )-----------( 225      ( 08 Mar 2021 04:38 )             35.6     Hgb Trend: 11.2<--, 11.6<--, 11.5<--, 14.8<--  03-08    141  |  111<H>  |  12  ----------------------------<  96  3.6   |  22  |  0.92    Ca    8.9      08 Mar 2021 04:38  Phos  3.0     03-08  Mg     2.3     03-08    TPro  7.4  /  Alb  3.1<L>  /  TBili  0.5  /  DBili  x   /  AST  21  /  ALT  25  /  AlkPhos  46  03-08              MICROBIOLOGY:     RADIOLOGY:  [ ] Reviewed and interpreted by me

## 2021-03-09 LAB
ANION GAP SERPL CALC-SCNC: 11 MMOL/L — SIGNIFICANT CHANGE UP (ref 5–17)
BUN SERPL-MCNC: 13 MG/DL — SIGNIFICANT CHANGE UP (ref 7–23)
CALCIUM SERPL-MCNC: 8.6 MG/DL — SIGNIFICANT CHANGE UP (ref 8.5–10.1)
CHLORIDE SERPL-SCNC: 108 MMOL/L — SIGNIFICANT CHANGE UP (ref 96–108)
CO2 SERPL-SCNC: 19 MMOL/L — LOW (ref 22–31)
CREAT SERPL-MCNC: 1.09 MG/DL — SIGNIFICANT CHANGE UP (ref 0.5–1.3)
GLUCOSE SERPL-MCNC: 72 MG/DL — SIGNIFICANT CHANGE UP (ref 70–99)
GRAM STN FLD: SIGNIFICANT CHANGE UP
HCT VFR BLD CALC: 31.6 % — LOW (ref 39–50)
HGB BLD-MCNC: 10.3 G/DL — LOW (ref 13–17)
MAGNESIUM SERPL-MCNC: 2.1 MG/DL — SIGNIFICANT CHANGE UP (ref 1.6–2.6)
MCHC RBC-ENTMCNC: 25.7 PG — LOW (ref 27–34)
MCHC RBC-ENTMCNC: 32.6 GM/DL — SIGNIFICANT CHANGE UP (ref 32–36)
MCV RBC AUTO: 78.8 FL — LOW (ref 80–100)
NRBC # BLD: 0 /100 WBCS — SIGNIFICANT CHANGE UP (ref 0–0)
PHOSPHATE SERPL-MCNC: 3.1 MG/DL — SIGNIFICANT CHANGE UP (ref 2.5–4.5)
PLATELET # BLD AUTO: 176 K/UL — SIGNIFICANT CHANGE UP (ref 150–400)
POTASSIUM SERPL-MCNC: 4.4 MMOL/L — SIGNIFICANT CHANGE UP (ref 3.5–5.3)
POTASSIUM SERPL-SCNC: 4.4 MMOL/L — SIGNIFICANT CHANGE UP (ref 3.5–5.3)
RBC # BLD: 4.01 M/UL — LOW (ref 4.2–5.8)
RBC # FLD: 16.7 % — HIGH (ref 10.3–14.5)
SODIUM SERPL-SCNC: 138 MMOL/L — SIGNIFICANT CHANGE UP (ref 135–145)
SPECIMEN SOURCE: SIGNIFICANT CHANGE UP
WBC # BLD: 7.45 K/UL — SIGNIFICANT CHANGE UP (ref 3.8–10.5)
WBC # FLD AUTO: 7.45 K/UL — SIGNIFICANT CHANGE UP (ref 3.8–10.5)

## 2021-03-09 PROCEDURE — 99291 CRITICAL CARE FIRST HOUR: CPT

## 2021-03-09 RX ORDER — AMPICILLIN SODIUM AND SULBACTAM SODIUM 250; 125 MG/ML; MG/ML
3 INJECTION, POWDER, FOR SUSPENSION INTRAMUSCULAR; INTRAVENOUS EVERY 6 HOURS
Refills: 0 | Status: DISCONTINUED | OUTPATIENT
Start: 2021-03-09 | End: 2021-03-10

## 2021-03-09 RX ORDER — SODIUM CHLORIDE 9 MG/ML
1000 INJECTION, SOLUTION INTRAVENOUS ONCE
Refills: 0 | Status: COMPLETED | OUTPATIENT
Start: 2021-03-09 | End: 2021-03-09

## 2021-03-09 RX ORDER — AMPICILLIN SODIUM AND SULBACTAM SODIUM 250; 125 MG/ML; MG/ML
INJECTION, POWDER, FOR SUSPENSION INTRAMUSCULAR; INTRAVENOUS
Refills: 0 | Status: DISCONTINUED | OUTPATIENT
Start: 2021-03-09 | End: 2021-03-10

## 2021-03-09 RX ORDER — ACETAMINOPHEN 500 MG
1000 TABLET ORAL ONCE
Refills: 0 | Status: COMPLETED | OUTPATIENT
Start: 2021-03-09 | End: 2021-03-09

## 2021-03-09 RX ORDER — PHENOBARBITAL 60 MG
130 TABLET ORAL EVERY 8 HOURS
Refills: 0 | Status: DISCONTINUED | OUTPATIENT
Start: 2021-03-09 | End: 2021-03-11

## 2021-03-09 RX ORDER — AMPICILLIN SODIUM AND SULBACTAM SODIUM 250; 125 MG/ML; MG/ML
3 INJECTION, POWDER, FOR SUSPENSION INTRAMUSCULAR; INTRAVENOUS ONCE
Refills: 0 | Status: COMPLETED | OUTPATIENT
Start: 2021-03-09 | End: 2021-03-09

## 2021-03-09 RX ADMIN — Medication 130 MILLIGRAM(S): at 05:05

## 2021-03-09 RX ADMIN — Medication 130 MILLIGRAM(S): at 13:53

## 2021-03-09 RX ADMIN — SODIUM CHLORIDE 1000 MILLILITER(S): 9 INJECTION, SOLUTION INTRAVENOUS at 00:14

## 2021-03-09 RX ADMIN — DEXMEDETOMIDINE HYDROCHLORIDE IN 0.9% SODIUM CHLORIDE 13.2 MICROGRAM(S)/KG/HR: 4 INJECTION INTRAVENOUS at 09:11

## 2021-03-09 RX ADMIN — Medication 130 MILLIGRAM(S): at 02:35

## 2021-03-09 RX ADMIN — DEXMEDETOMIDINE HYDROCHLORIDE IN 0.9% SODIUM CHLORIDE 13.2 MICROGRAM(S)/KG/HR: 4 INJECTION INTRAVENOUS at 23:13

## 2021-03-09 RX ADMIN — AMPICILLIN SODIUM AND SULBACTAM SODIUM 200 GRAM(S): 250; 125 INJECTION, POWDER, FOR SUSPENSION INTRAMUSCULAR; INTRAVENOUS at 12:41

## 2021-03-09 RX ADMIN — SODIUM CHLORIDE 75 MILLILITER(S): 9 INJECTION, SOLUTION INTRAVENOUS at 21:41

## 2021-03-09 RX ADMIN — AMPICILLIN SODIUM AND SULBACTAM SODIUM 200 GRAM(S): 250; 125 INJECTION, POWDER, FOR SUSPENSION INTRAMUSCULAR; INTRAVENOUS at 17:40

## 2021-03-09 RX ADMIN — CHLORHEXIDINE GLUCONATE 1 APPLICATION(S): 213 SOLUTION TOPICAL at 06:08

## 2021-03-09 RX ADMIN — Medication 400 MILLIGRAM(S): at 15:29

## 2021-03-09 RX ADMIN — DEXMEDETOMIDINE HYDROCHLORIDE IN 0.9% SODIUM CHLORIDE 13.2 MICROGRAM(S)/KG/HR: 4 INJECTION INTRAVENOUS at 17:40

## 2021-03-09 RX ADMIN — SODIUM CHLORIDE 75 MILLILITER(S): 9 INJECTION, SOLUTION INTRAVENOUS at 09:11

## 2021-03-09 RX ADMIN — Medication 130 MILLIGRAM(S): at 21:37

## 2021-03-09 RX ADMIN — ATORVASTATIN CALCIUM 20 MILLIGRAM(S): 80 TABLET, FILM COATED ORAL at 22:41

## 2021-03-09 RX ADMIN — ENOXAPARIN SODIUM 40 MILLIGRAM(S): 100 INJECTION SUBCUTANEOUS at 12:05

## 2021-03-09 RX ADMIN — DEXMEDETOMIDINE HYDROCHLORIDE IN 0.9% SODIUM CHLORIDE 13.2 MICROGRAM(S)/KG/HR: 4 INJECTION INTRAVENOUS at 04:20

## 2021-03-09 RX ADMIN — Medication 1000 MILLIGRAM(S): at 16:00

## 2021-03-09 RX ADMIN — DEXMEDETOMIDINE HYDROCHLORIDE IN 0.9% SODIUM CHLORIDE 13.2 MICROGRAM(S)/KG/HR: 4 INJECTION INTRAVENOUS at 21:41

## 2021-03-09 RX ADMIN — Medication 1000 MILLIGRAM(S): at 00:00

## 2021-03-09 RX ADMIN — DEXMEDETOMIDINE HYDROCHLORIDE IN 0.9% SODIUM CHLORIDE 13.2 MICROGRAM(S)/KG/HR: 4 INJECTION INTRAVENOUS at 12:05

## 2021-03-09 NOTE — PROGRESS NOTE ADULT - ASSESSMENT
55M w/ EtOH use disorder. Admitted w/ EtOh withdrawal, transferred to ICU for severe symptoms/DTs. Started spiking fevers.     #Neuro -  will start lowering precedex and decrease phenobarb frequency continue w/ phenobarb PRN  #CV - HD stable  #Pulm - on 2L NC; EtCO2 monitoring  #ID- spiking temps as of yesterday; infectious workup pending; considering he continues to spike fever and new O2 requirements, will start unasyn for possible aspiration pneumonia; f/u blood and sputum cx; send procal  #Renal/metabolic - admitted w/ ROSA, that has now resolved; electrolytes and UOP acceptable  #GI- NPO while very sedated but can eat if awake  #Heme - no active issues  #Endo - BG has been acceptable; thiamine, folic acid and MVI  #PPx - lovenox  #Dispo- remains in ICU on precedex for severe agitation

## 2021-03-09 NOTE — PROGRESS NOTE ADULT - SUBJECTIVE AND OBJECTIVE BOX
CHIEF COMPLAINT:    Interval Events:    REVIEW OF SYSTEMS:  Constitutional: [ ] fevers [ ] chills [ ] weight loss [ ] weight gain  HEENT: [ ] dry eyes [ ] eye irritation [ ] postnasal drip [ ] nasal congestion  CV: [ ] chest pain [ ] orthopnea [ ] palpitations [ ] murmur  Resp: [ ] cough [ ] shortness of breath [ ] dyspnea [ ] wheezing [ ] sputum [ ] hemoptysis  GI: [ ] nausea [ ] vomiting [ ] diarrhea [ ] constipation [ ] abd pain [ ] dysphagia   : [ ] dysuria [ ] nocturia [ ] hematuria [ ] increased urinary frequency  Musculoskeletal: [ ] back pain [ ] myalgias [ ] arthralgias [ ] fracture  Skin: [ ] rash [ ] itch  Neurological: [ ] headache [ ] dizziness [ ] syncope [ ] weakness [ ] numbness  Hematologic/Lymphatic: [ ] anemia [ ] bleeding problem  Allergic/Immunologic: [ ] itchy eyes [ ] nasal discharge [ ] hives [ ] angioedema  [ ] All other systems negative  [ ] Unable to assess ROS because ________    OBJECTIVE:  ICU Vital Signs Last 24 Hrs  T(C): 37.8 (09 Mar 2021 07:00), Max: 40.2 (08 Mar 2021 16:00)  T(F): 100.1 (09 Mar 2021 07:00), Max: 104.3 (08 Mar 2021 16:00)  HR: 91 (09 Mar 2021 07:00) (72 - 121)  BP: 124/81 (09 Mar 2021 07:00) (74/49 - 146/89)  BP(mean): 91 (09 Mar 2021 07:00) (48 - 103)  ABP: --  ABP(mean): --  RR: 14 (09 Mar 2021 07:00) (13 - 25)  SpO2: 100% (09 Mar 2021 07:00) (97% - 100%)        03-08 @ 07:01  -  03-09 @ 07:00  --------------------------------------------------------  IN: 2930.5 mL / OUT: 790 mL / NET: 2140.5 mL      CAPILLARY BLOOD GLUCOSE          PHYSICAL EXAM:  General:   HEENT:   Neck:   Respiratory:   Cardiovascular:   Abdomen:   Extremities:   Skin:   Neurological:  Psychiatry:    LINES:    HOSPITAL MEDICATIONS:  MEDICATIONS  (STANDING):  atorvastatin 20 milliGRAM(s) Oral at bedtime  chlorhexidine 4% Liquid 1 Application(s) Topical <User Schedule>  dexMEDEtomidine Infusion 0.7 MICROgram(s)/kG/Hr (13.2 mL/Hr) IV Continuous <Continuous>  dextrose 5% + lactated ringers. 1000 milliLiter(s) (75 mL/Hr) IV Continuous <Continuous>  enoxaparin Injectable 40 milliGRAM(s) SubCutaneous daily  folic acid 1 milliGRAM(s) Oral daily  metoprolol tartrate 25 milliGRAM(s) Oral two times a day  multivitamin 1 Tablet(s) Oral daily  pantoprazole    Tablet 40 milliGRAM(s) Oral before breakfast  PHENobarbital Injectable 130 milliGRAM(s) IV Push every 6 hours  thiamine 100 milliGRAM(s) Oral daily    MEDICATIONS  (PRN):  ondansetron Injectable 4 milliGRAM(s) IV Push every 6 hours PRN Nausea and/or Vomiting  PHENobarbital Injectable 130 milliGRAM(s) IV Push every 2 hours PRN ciwa >8      LABS:                        10.3   7.45  )-----------( 176      ( 09 Mar 2021 06:48 )             31.6     Hgb Trend: 10.3<--, 11.2<--, 11.6<--, 11.5<--, 14.8<--  03-08    141  |  111<H>  |  12  ----------------------------<  96  3.6   |  22  |  0.92    Ca    8.9      08 Mar 2021 04:38  Phos  3.0     03-08  Mg     2.3     03-08    TPro  7.4  /  Alb  3.1<L>  /  TBili  0.5  /  DBili  x   /  AST  21  /  ALT  25  /  AlkPhos  46  03-08              MICROBIOLOGY:     RADIOLOGY:  [ ] Reviewed and interpreted by me CHIEF COMPLAINT:    Interval Events:  Tmax 102.4  received 1 additional phenobarb  received 1 L LR    REVIEW OF SYSTEMS:  [x ] Unable to assess ROS because ________    OBJECTIVE:  ICU Vital Signs Last 24 Hrs  T(C): 37.8 (09 Mar 2021 07:00), Max: 40.2 (08 Mar 2021 16:00)  T(F): 100.1 (09 Mar 2021 07:00), Max: 104.3 (08 Mar 2021 16:00)  HR: 91 (09 Mar 2021 07:00) (72 - 121)  BP: 124/81 (09 Mar 2021 07:00) (74/49 - 146/89)  BP(mean): 91 (09 Mar 2021 07:00) (48 - 103)  ABP: --  ABP(mean): --  RR: 14 (09 Mar 2021 07:00) (13 - 25)  SpO2: 100% (09 Mar 2021 07:00) (97% - 100%)        03-08 @ 07:01  -  03-09 @ 07:00  --------------------------------------------------------  IN: 2930.5 mL / OUT: 790 mL / NET: 2140.5 mL      CAPILLARY BLOOD GLUCOSE          PHYSICAL EXAM:  General: NAD, non-toxic appearing  HEENT: MMM, EOMI  Neck: supple  Respiratory: CTA b/l  Cardiovascular: s1s2 RRR  Abdomen: soft, non distended, non tender  Extremities: warm, no edema or clubbing  Skin: intact  Neurological: moves all extremities; no focal deficits  Psychiatry: intermittently sleepy/sedated and then agitated    LINES:    HOSPITAL MEDICATIONS:  MEDICATIONS  (STANDING):  atorvastatin 20 milliGRAM(s) Oral at bedtime  chlorhexidine 4% Liquid 1 Application(s) Topical <User Schedule>  dexMEDEtomidine Infusion 0.7 MICROgram(s)/kG/Hr (13.2 mL/Hr) IV Continuous <Continuous>  dextrose 5% + lactated ringers. 1000 milliLiter(s) (75 mL/Hr) IV Continuous <Continuous>  enoxaparin Injectable 40 milliGRAM(s) SubCutaneous daily  folic acid 1 milliGRAM(s) Oral daily  metoprolol tartrate 25 milliGRAM(s) Oral two times a day  multivitamin 1 Tablet(s) Oral daily  pantoprazole    Tablet 40 milliGRAM(s) Oral before breakfast  PHENobarbital Injectable 130 milliGRAM(s) IV Push every 6 hours  thiamine 100 milliGRAM(s) Oral daily    MEDICATIONS  (PRN):  ondansetron Injectable 4 milliGRAM(s) IV Push every 6 hours PRN Nausea and/or Vomiting  PHENobarbital Injectable 130 milliGRAM(s) IV Push every 2 hours PRN ciwa >8      LABS:                        10.3   7.45  )-----------( 176      ( 09 Mar 2021 06:48 )             31.6     Hgb Trend: 10.3<--, 11.2<--, 11.6<--, 11.5<--, 14.8<--  03-08    141  |  111<H>  |  12  ----------------------------<  96  3.6   |  22  |  0.92    Ca    8.9      08 Mar 2021 04:38  Phos  3.0     03-08  Mg     2.3     03-08    TPro  7.4  /  Alb  3.1<L>  /  TBili  0.5  /  DBili  x   /  AST  21  /  ALT  25  /  AlkPhos  46  03-08              MICROBIOLOGY:     RADIOLOGY:  [ ] Reviewed and interpreted by me

## 2021-03-10 LAB
ANION GAP SERPL CALC-SCNC: 5 MMOL/L — SIGNIFICANT CHANGE UP (ref 5–17)
ANION GAP SERPL CALC-SCNC: 7 MMOL/L — SIGNIFICANT CHANGE UP (ref 5–17)
BUN SERPL-MCNC: 10 MG/DL — SIGNIFICANT CHANGE UP (ref 7–23)
BUN SERPL-MCNC: 7 MG/DL — SIGNIFICANT CHANGE UP (ref 7–23)
CALCIUM SERPL-MCNC: 8 MG/DL — LOW (ref 8.5–10.1)
CALCIUM SERPL-MCNC: 8.2 MG/DL — LOW (ref 8.5–10.1)
CHLORIDE SERPL-SCNC: 107 MMOL/L — SIGNIFICANT CHANGE UP (ref 96–108)
CHLORIDE SERPL-SCNC: 113 MMOL/L — HIGH (ref 96–108)
CO2 SERPL-SCNC: 27 MMOL/L — SIGNIFICANT CHANGE UP (ref 22–31)
CO2 SERPL-SCNC: 27 MMOL/L — SIGNIFICANT CHANGE UP (ref 22–31)
CREAT SERPL-MCNC: 0.96 MG/DL — SIGNIFICANT CHANGE UP (ref 0.5–1.3)
CREAT SERPL-MCNC: 1.05 MG/DL — SIGNIFICANT CHANGE UP (ref 0.5–1.3)
GLUCOSE SERPL-MCNC: 126 MG/DL — HIGH (ref 70–99)
GLUCOSE SERPL-MCNC: 96 MG/DL — SIGNIFICANT CHANGE UP (ref 70–99)
HCT VFR BLD CALC: 27.5 % — LOW (ref 39–50)
HCT VFR BLD CALC: 28.8 % — LOW (ref 39–50)
HGB BLD-MCNC: 9 G/DL — LOW (ref 13–17)
HGB BLD-MCNC: 9.1 G/DL — LOW (ref 13–17)
MAGNESIUM SERPL-MCNC: 1.7 MG/DL — SIGNIFICANT CHANGE UP (ref 1.6–2.6)
MAGNESIUM SERPL-MCNC: 2.1 MG/DL — SIGNIFICANT CHANGE UP (ref 1.6–2.6)
MCHC RBC-ENTMCNC: 25.6 PG — LOW (ref 27–34)
MCHC RBC-ENTMCNC: 26.1 PG — LOW (ref 27–34)
MCHC RBC-ENTMCNC: 31.6 GM/DL — LOW (ref 32–36)
MCHC RBC-ENTMCNC: 32.7 GM/DL — SIGNIFICANT CHANGE UP (ref 32–36)
MCV RBC AUTO: 79.7 FL — LOW (ref 80–100)
MCV RBC AUTO: 80.9 FL — SIGNIFICANT CHANGE UP (ref 80–100)
MRSA PCR RESULT.: SIGNIFICANT CHANGE UP
NRBC # BLD: 0 /100 WBCS — SIGNIFICANT CHANGE UP (ref 0–0)
NRBC # BLD: 0 /100 WBCS — SIGNIFICANT CHANGE UP (ref 0–0)
PHOSPHATE SERPL-MCNC: 2.1 MG/DL — LOW (ref 2.5–4.5)
PHOSPHATE SERPL-MCNC: 3 MG/DL — SIGNIFICANT CHANGE UP (ref 2.5–4.5)
PLATELET # BLD AUTO: 162 K/UL — SIGNIFICANT CHANGE UP (ref 150–400)
PLATELET # BLD AUTO: 166 K/UL — SIGNIFICANT CHANGE UP (ref 150–400)
POTASSIUM SERPL-MCNC: 2.8 MMOL/L — CRITICAL LOW (ref 3.5–5.3)
POTASSIUM SERPL-MCNC: 3.5 MMOL/L — SIGNIFICANT CHANGE UP (ref 3.5–5.3)
POTASSIUM SERPL-SCNC: 2.8 MMOL/L — CRITICAL LOW (ref 3.5–5.3)
POTASSIUM SERPL-SCNC: 3.5 MMOL/L — SIGNIFICANT CHANGE UP (ref 3.5–5.3)
PROCALCITONIN SERPL-MCNC: 1.69 NG/ML — HIGH (ref 0.02–0.1)
RBC # BLD: 3.45 M/UL — LOW (ref 4.2–5.8)
RBC # BLD: 3.56 M/UL — LOW (ref 4.2–5.8)
RBC # FLD: 16.5 % — HIGH (ref 10.3–14.5)
RBC # FLD: 16.6 % — HIGH (ref 10.3–14.5)
S AUREUS DNA NOSE QL NAA+PROBE: DETECTED
SODIUM SERPL-SCNC: 141 MMOL/L — SIGNIFICANT CHANGE UP (ref 135–145)
SODIUM SERPL-SCNC: 145 MMOL/L — SIGNIFICANT CHANGE UP (ref 135–145)
WBC # BLD: 4.84 K/UL — SIGNIFICANT CHANGE UP (ref 3.8–10.5)
WBC # BLD: 6.51 K/UL — SIGNIFICANT CHANGE UP (ref 3.8–10.5)
WBC # FLD AUTO: 4.84 K/UL — SIGNIFICANT CHANGE UP (ref 3.8–10.5)
WBC # FLD AUTO: 6.51 K/UL — SIGNIFICANT CHANGE UP (ref 3.8–10.5)

## 2021-03-10 PROCEDURE — 99291 CRITICAL CARE FIRST HOUR: CPT

## 2021-03-10 RX ORDER — POTASSIUM CHLORIDE 20 MEQ
10 PACKET (EA) ORAL
Refills: 0 | Status: COMPLETED | OUTPATIENT
Start: 2021-03-10 | End: 2021-03-10

## 2021-03-10 RX ORDER — POTASSIUM PHOSPHATE, MONOBASIC POTASSIUM PHOSPHATE, DIBASIC 236; 224 MG/ML; MG/ML
30 INJECTION, SOLUTION INTRAVENOUS ONCE
Refills: 0 | Status: COMPLETED | OUTPATIENT
Start: 2021-03-10 | End: 2021-03-10

## 2021-03-10 RX ORDER — MAGNESIUM SULFATE 500 MG/ML
2 VIAL (ML) INJECTION ONCE
Refills: 0 | Status: COMPLETED | OUTPATIENT
Start: 2021-03-10 | End: 2021-03-10

## 2021-03-10 RX ORDER — SODIUM CHLORIDE 9 MG/ML
1000 INJECTION, SOLUTION INTRAVENOUS
Refills: 0 | Status: DISCONTINUED | OUTPATIENT
Start: 2021-03-10 | End: 2021-03-10

## 2021-03-10 RX ORDER — FOLIC ACID 0.8 MG
1 TABLET ORAL DAILY
Refills: 0 | Status: DISCONTINUED | OUTPATIENT
Start: 2021-03-10 | End: 2021-03-24

## 2021-03-10 RX ORDER — PANTOPRAZOLE SODIUM 20 MG/1
40 TABLET, DELAYED RELEASE ORAL DAILY
Refills: 0 | Status: DISCONTINUED | OUTPATIENT
Start: 2021-03-10 | End: 2021-03-14

## 2021-03-10 RX ORDER — DEXTROSE MONOHYDRATE, SODIUM CHLORIDE, AND POTASSIUM CHLORIDE 50; .745; 4.5 G/1000ML; G/1000ML; G/1000ML
1000 INJECTION, SOLUTION INTRAVENOUS
Refills: 0 | Status: DISCONTINUED | OUTPATIENT
Start: 2021-03-10 | End: 2021-03-12

## 2021-03-10 RX ORDER — THIAMINE MONONITRATE (VIT B1) 100 MG
100 TABLET ORAL DAILY
Refills: 0 | Status: DISCONTINUED | OUTPATIENT
Start: 2021-03-10 | End: 2021-03-24

## 2021-03-10 RX ORDER — PHENOBARBITAL 60 MG
130 TABLET ORAL ONCE
Refills: 0 | Status: DISCONTINUED | OUTPATIENT
Start: 2021-03-10 | End: 2021-03-10

## 2021-03-10 RX ORDER — VANCOMYCIN HCL 1 G
1250 VIAL (EA) INTRAVENOUS EVERY 12 HOURS
Refills: 0 | Status: DISCONTINUED | OUTPATIENT
Start: 2021-03-10 | End: 2021-03-10

## 2021-03-10 RX ORDER — VANCOMYCIN HCL 1 G
1000 VIAL (EA) INTRAVENOUS EVERY 12 HOURS
Refills: 0 | Status: DISCONTINUED | OUTPATIENT
Start: 2021-03-10 | End: 2021-03-10

## 2021-03-10 RX ORDER — SODIUM CHLORIDE 9 MG/ML
1000 INJECTION, SOLUTION INTRAVENOUS ONCE
Refills: 0 | Status: COMPLETED | OUTPATIENT
Start: 2021-03-10 | End: 2021-03-10

## 2021-03-10 RX ORDER — AMPICILLIN SODIUM AND SULBACTAM SODIUM 250; 125 MG/ML; MG/ML
3 INJECTION, POWDER, FOR SUSPENSION INTRAMUSCULAR; INTRAVENOUS EVERY 6 HOURS
Refills: 0 | Status: COMPLETED | OUTPATIENT
Start: 2021-03-10 | End: 2021-03-15

## 2021-03-10 RX ADMIN — Medication 130 MILLIGRAM(S): at 05:31

## 2021-03-10 RX ADMIN — DEXMEDETOMIDINE HYDROCHLORIDE IN 0.9% SODIUM CHLORIDE 13.2 MICROGRAM(S)/KG/HR: 4 INJECTION INTRAVENOUS at 19:58

## 2021-03-10 RX ADMIN — Medication 130 MILLIGRAM(S): at 15:16

## 2021-03-10 RX ADMIN — DEXMEDETOMIDINE HYDROCHLORIDE IN 0.9% SODIUM CHLORIDE 13.2 MICROGRAM(S)/KG/HR: 4 INJECTION INTRAVENOUS at 15:16

## 2021-03-10 RX ADMIN — AMPICILLIN SODIUM AND SULBACTAM SODIUM 200 GRAM(S): 250; 125 INJECTION, POWDER, FOR SUSPENSION INTRAMUSCULAR; INTRAVENOUS at 05:54

## 2021-03-10 RX ADMIN — SODIUM CHLORIDE 1000 MILLILITER(S): 9 INJECTION, SOLUTION INTRAVENOUS at 06:39

## 2021-03-10 RX ADMIN — Medication 1 MILLIGRAM(S): at 12:58

## 2021-03-10 RX ADMIN — Medication 130 MILLIGRAM(S): at 22:25

## 2021-03-10 RX ADMIN — DEXMEDETOMIDINE HYDROCHLORIDE IN 0.9% SODIUM CHLORIDE 13.2 MICROGRAM(S)/KG/HR: 4 INJECTION INTRAVENOUS at 04:11

## 2021-03-10 RX ADMIN — Medication 130 MILLIGRAM(S): at 14:26

## 2021-03-10 RX ADMIN — DEXMEDETOMIDINE HYDROCHLORIDE IN 0.9% SODIUM CHLORIDE 13.2 MICROGRAM(S)/KG/HR: 4 INJECTION INTRAVENOUS at 17:27

## 2021-03-10 RX ADMIN — ENOXAPARIN SODIUM 40 MILLIGRAM(S): 100 INJECTION SUBCUTANEOUS at 12:57

## 2021-03-10 RX ADMIN — CHLORHEXIDINE GLUCONATE 1 APPLICATION(S): 213 SOLUTION TOPICAL at 06:02

## 2021-03-10 RX ADMIN — DEXTROSE MONOHYDRATE, SODIUM CHLORIDE, AND POTASSIUM CHLORIDE 100 MILLILITER(S): 50; .745; 4.5 INJECTION, SOLUTION INTRAVENOUS at 14:26

## 2021-03-10 RX ADMIN — Medication 25 MILLIGRAM(S): at 17:31

## 2021-03-10 RX ADMIN — PANTOPRAZOLE SODIUM 40 MILLIGRAM(S): 20 TABLET, DELAYED RELEASE ORAL at 12:58

## 2021-03-10 RX ADMIN — DEXMEDETOMIDINE HYDROCHLORIDE IN 0.9% SODIUM CHLORIDE 13.2 MICROGRAM(S)/KG/HR: 4 INJECTION INTRAVENOUS at 23:03

## 2021-03-10 RX ADMIN — AMPICILLIN SODIUM AND SULBACTAM SODIUM 200 GRAM(S): 250; 125 INJECTION, POWDER, FOR SUSPENSION INTRAMUSCULAR; INTRAVENOUS at 12:46

## 2021-03-10 RX ADMIN — Medication 130 MILLIGRAM(S): at 18:47

## 2021-03-10 RX ADMIN — AMPICILLIN SODIUM AND SULBACTAM SODIUM 200 GRAM(S): 250; 125 INJECTION, POWDER, FOR SUSPENSION INTRAMUSCULAR; INTRAVENOUS at 00:32

## 2021-03-10 RX ADMIN — POTASSIUM PHOSPHATE, MONOBASIC POTASSIUM PHOSPHATE, DIBASIC 83.33 MILLIMOLE(S): 236; 224 INJECTION, SOLUTION INTRAVENOUS at 06:05

## 2021-03-10 RX ADMIN — Medication 130 MILLIGRAM(S): at 22:43

## 2021-03-10 RX ADMIN — Medication 130 MILLIGRAM(S): at 21:35

## 2021-03-10 RX ADMIN — AMPICILLIN SODIUM AND SULBACTAM SODIUM 200 GRAM(S): 250; 125 INJECTION, POWDER, FOR SUSPENSION INTRAMUSCULAR; INTRAVENOUS at 17:27

## 2021-03-10 RX ADMIN — DEXTROSE MONOHYDRATE, SODIUM CHLORIDE, AND POTASSIUM CHLORIDE 100 MILLILITER(S): 50; .745; 4.5 INJECTION, SOLUTION INTRAVENOUS at 05:54

## 2021-03-10 RX ADMIN — Medication 166.67 MILLIGRAM(S): at 12:59

## 2021-03-10 RX ADMIN — Medication 100 MILLIEQUIVALENT(S): at 07:48

## 2021-03-10 RX ADMIN — Medication 50 GRAM(S): at 18:47

## 2021-03-10 RX ADMIN — Medication 100 MILLIEQUIVALENT(S): at 18:46

## 2021-03-10 RX ADMIN — DEXMEDETOMIDINE HYDROCHLORIDE IN 0.9% SODIUM CHLORIDE 13.2 MICROGRAM(S)/KG/HR: 4 INJECTION INTRAVENOUS at 18:47

## 2021-03-10 RX ADMIN — Medication 100 MILLIGRAM(S): at 12:58

## 2021-03-10 RX ADMIN — Medication 100 MILLIEQUIVALENT(S): at 19:58

## 2021-03-10 RX ADMIN — Medication 100 MILLIEQUIVALENT(S): at 17:32

## 2021-03-10 RX ADMIN — AMPICILLIN SODIUM AND SULBACTAM SODIUM 200 GRAM(S): 250; 125 INJECTION, POWDER, FOR SUSPENSION INTRAMUSCULAR; INTRAVENOUS at 23:04

## 2021-03-10 RX ADMIN — ATORVASTATIN CALCIUM 20 MILLIGRAM(S): 80 TABLET, FILM COATED ORAL at 21:35

## 2021-03-10 RX ADMIN — DEXMEDETOMIDINE HYDROCHLORIDE IN 0.9% SODIUM CHLORIDE 13.2 MICROGRAM(S)/KG/HR: 4 INJECTION INTRAVENOUS at 11:55

## 2021-03-10 RX ADMIN — Medication 100 MILLIEQUIVALENT(S): at 06:48

## 2021-03-10 RX ADMIN — Medication 100 MILLIEQUIVALENT(S): at 05:39

## 2021-03-10 RX ADMIN — Medication 1 TABLET(S): at 12:58

## 2021-03-10 NOTE — PROGRESS NOTE ADULT - ASSESSMENT
55M w/ EtOH use disorder. Admitted w/ EtOh withdrawal, transferred to ICU for severe symptoms/DTs. Started spiking fevers and sputum growing staph    #Neuro - continue w/ precedex and phenobarb and decreasd frequency; can give phenobarb PRN  #CV - HD stable  #Pulm - on 2L NC; EtCO2 monitoring  #ID- started on unasyn for fevers, possible asp pna; sputum growing staph aureus so will give vancomycin until MRSA swab / sensitivities result; blodo cx NGTD  #Renal/metabolic - admitted w/ ROSA, that has now resolved; K, Phos repleted; repeat BMP this afternoon to ensure electrolytes repleted sufficiently  #GI- NPO while very sedated but can eat if awake  #Heme - hgb dropped today, will repeat CBC this afternoon; no active bleeding  #Endo - BG has been acceptable; thiamine, folic acid and MVI  #PPx - lovenox  #Dispo- remains in ICU on precedex for severe agitation

## 2021-03-10 NOTE — PROGRESS NOTE ADULT - SUBJECTIVE AND OBJECTIVE BOX
CHIEF COMPLAINT:    Interval Events:    REVIEW OF SYSTEMS:  Constitutional: [ ] fevers [ ] chills [ ] weight loss [ ] weight gain  HEENT: [ ] dry eyes [ ] eye irritation [ ] postnasal drip [ ] nasal congestion  CV: [ ] chest pain [ ] orthopnea [ ] palpitations [ ] murmur  Resp: [ ] cough [ ] shortness of breath [ ] dyspnea [ ] wheezing [ ] sputum [ ] hemoptysis  GI: [ ] nausea [ ] vomiting [ ] diarrhea [ ] constipation [ ] abd pain [ ] dysphagia   : [ ] dysuria [ ] nocturia [ ] hematuria [ ] increased urinary frequency  Musculoskeletal: [ ] back pain [ ] myalgias [ ] arthralgias [ ] fracture  Skin: [ ] rash [ ] itch  Neurological: [ ] headache [ ] dizziness [ ] syncope [ ] weakness [ ] numbness  Hematologic/Lymphatic: [ ] anemia [ ] bleeding problem  Allergic/Immunologic: [ ] itchy eyes [ ] nasal discharge [ ] hives [ ] angioedema  [ ] All other systems negative  [ ] Unable to assess ROS because ________    OBJECTIVE:  ICU Vital Signs Last 24 Hrs  T(C): 36.9 (10 Mar 2021 07:12), Max: 39.3 (09 Mar 2021 15:00)  T(F): 98.4 (10 Mar 2021 07:12), Max: 102.8 (09 Mar 2021 15:00)  HR: 73 (10 Mar 2021 07:00) (73 - 120)  BP: 91/54 (10 Mar 2021 07:00) (82/46 - 142/85)  BP(mean): 59 (10 Mar 2021 05:00) (52 - 102)  ABP: --  ABP(mean): --  RR: 16 (10 Mar 2021 07:00) (15 - 27)  SpO2: 98% (10 Mar 2021 07:00) (95% - 100%)        03-09 @ 07:01  -  03-10 @ 07:00  --------------------------------------------------------  IN: 3342.6 mL / OUT: 600 mL / NET: 2742.6 mL      CAPILLARY BLOOD GLUCOSE          PHYSICAL EXAM:  General:   HEENT:   Neck:   Respiratory:   Cardiovascular:   Abdomen:   Extremities:   Skin:   Neurological:  Psychiatry:    LINES:    HOSPITAL MEDICATIONS:  MEDICATIONS  (STANDING):  ampicillin/sulbactam  IVPB      ampicillin/sulbactam  IVPB 3 Gram(s) IV Intermittent every 6 hours  atorvastatin 20 milliGRAM(s) Oral at bedtime  chlorhexidine 4% Liquid 1 Application(s) Topical <User Schedule>  dexMEDEtomidine Infusion 0.7 MICROgram(s)/kG/Hr (13.2 mL/Hr) IV Continuous <Continuous>  dextrose 5% + lactated ringers with potassium chloride 20 mEq/L 1000 milliLiter(s) (100 mL/Hr) IV Continuous <Continuous>  enoxaparin Injectable 40 milliGRAM(s) SubCutaneous daily  folic acid Injectable 1 milliGRAM(s) IV Push daily  metoprolol tartrate 25 milliGRAM(s) Oral two times a day  multivitamin 1 Tablet(s) Oral daily  pantoprazole  Injectable 40 milliGRAM(s) IV Push daily  PHENobarbital Injectable 130 milliGRAM(s) IV Push every 8 hours  potassium chloride  10 mEq/100 mL IVPB 10 milliEquivalent(s) IV Intermittent every 1 hour  thiamine Injectable 100 milliGRAM(s) IV Push daily    MEDICATIONS  (PRN):  ondansetron Injectable 4 milliGRAM(s) IV Push every 6 hours PRN Nausea and/or Vomiting  PHENobarbital Injectable 130 milliGRAM(s) IV Push every 2 hours PRN ciwa >8      LABS:                        9.0    6.51  )-----------( 162      ( 10 Mar 2021 05:00 )             27.5     Hgb Trend: 9.0<--, 10.3<--, 11.2<--, 11.6<--, 11.5<--  03-10    141  |  107  |  10  ----------------------------<  96  2.8<LL>   |  27  |  0.96    Ca    8.0<L>      10 Mar 2021 05:00  Phos  2.1     03-10  Mg     2.1     03-10                MICROBIOLOGY:     RADIOLOGY:  [ ] Reviewed and interpreted by me CHIEF COMPLAINT:    Interval Events:  increased precedex this morning  afebrile    REVIEW OF SYSTEMS:  [x ] Unable to assess ROS because sleepy    OBJECTIVE:  ICU Vital Signs Last 24 Hrs  T(C): 36.9 (10 Mar 2021 07:12), Max: 39.3 (09 Mar 2021 15:00)  T(F): 98.4 (10 Mar 2021 07:12), Max: 102.8 (09 Mar 2021 15:00)  HR: 73 (10 Mar 2021 07:00) (73 - 120)  BP: 91/54 (10 Mar 2021 07:00) (82/46 - 142/85)  BP(mean): 59 (10 Mar 2021 05:00) (52 - 102)  ABP: --  ABP(mean): --  RR: 16 (10 Mar 2021 07:00) (15 - 27)  SpO2: 98% (10 Mar 2021 07:00) (95% - 100%)        03-09 @ 07:01  -  03-10 @ 07:00  --------------------------------------------------------  IN: 3342.6 mL / OUT: 600 mL / NET: 2742.6 mL      CAPILLARY BLOOD GLUCOSE          PHYSICAL EXAM:  General: NAD, non-toxic appearing  HEENT: MMM, EOMI  Neck: supple  Respiratory: CTA b/l  Cardiovascular: s1s2 RRR  Abdomen: soft, non distended, non tender  Extremities: warm, no edema or clubbing  Skin: intact  Neurological: moves all extremities; no focal deficits  Psychiatry: intermittently sleepy/sedated and then agitated    LINES:    HOSPITAL MEDICATIONS:  MEDICATIONS  (STANDING):  ampicillin/sulbactam  IVPB      ampicillin/sulbactam  IVPB 3 Gram(s) IV Intermittent every 6 hours  atorvastatin 20 milliGRAM(s) Oral at bedtime  chlorhexidine 4% Liquid 1 Application(s) Topical <User Schedule>  dexMEDEtomidine Infusion 0.7 MICROgram(s)/kG/Hr (13.2 mL/Hr) IV Continuous <Continuous>  dextrose 5% + lactated ringers with potassium chloride 20 mEq/L 1000 milliLiter(s) (100 mL/Hr) IV Continuous <Continuous>  enoxaparin Injectable 40 milliGRAM(s) SubCutaneous daily  folic acid Injectable 1 milliGRAM(s) IV Push daily  metoprolol tartrate 25 milliGRAM(s) Oral two times a day  multivitamin 1 Tablet(s) Oral daily  pantoprazole  Injectable 40 milliGRAM(s) IV Push daily  PHENobarbital Injectable 130 milliGRAM(s) IV Push every 8 hours  potassium chloride  10 mEq/100 mL IVPB 10 milliEquivalent(s) IV Intermittent every 1 hour  thiamine Injectable 100 milliGRAM(s) IV Push daily    MEDICATIONS  (PRN):  ondansetron Injectable 4 milliGRAM(s) IV Push every 6 hours PRN Nausea and/or Vomiting  PHENobarbital Injectable 130 milliGRAM(s) IV Push every 2 hours PRN ciwa >8      LABS:                        9.0    6.51  )-----------( 162      ( 10 Mar 2021 05:00 )             27.5     Hgb Trend: 9.0<--, 10.3<--, 11.2<--, 11.6<--, 11.5<--  03-10    141  |  107  |  10  ----------------------------<  96  2.8<LL>   |  27  |  0.96    Ca    8.0<L>      10 Mar 2021 05:00  Phos  2.1     03-10  Mg     2.1     03-10                MICROBIOLOGY:     RADIOLOGY:  [ ] Reviewed and interpreted by me

## 2021-03-11 LAB
-  AMPICILLIN/SULBACTAM: SIGNIFICANT CHANGE UP
-  CEFAZOLIN: SIGNIFICANT CHANGE UP
-  CLINDAMYCIN: SIGNIFICANT CHANGE UP
-  ERYTHROMYCIN: SIGNIFICANT CHANGE UP
-  GENTAMICIN: SIGNIFICANT CHANGE UP
-  OXACILLIN: SIGNIFICANT CHANGE UP
-  PENICILLIN: SIGNIFICANT CHANGE UP
-  RIFAMPIN: SIGNIFICANT CHANGE UP
-  TETRACYCLINE: SIGNIFICANT CHANGE UP
-  TRIMETHOPRIM/SULFAMETHOXAZOLE: SIGNIFICANT CHANGE UP
-  VANCOMYCIN: SIGNIFICANT CHANGE UP
ANION GAP SERPL CALC-SCNC: 8 MMOL/L — SIGNIFICANT CHANGE UP (ref 5–17)
BUN SERPL-MCNC: 3 MG/DL — LOW (ref 7–23)
CALCIUM SERPL-MCNC: 8.6 MG/DL — SIGNIFICANT CHANGE UP (ref 8.5–10.1)
CHLORIDE SERPL-SCNC: 111 MMOL/L — HIGH (ref 96–108)
CO2 SERPL-SCNC: 22 MMOL/L — SIGNIFICANT CHANGE UP (ref 22–31)
CREAT SERPL-MCNC: 0.91 MG/DL — SIGNIFICANT CHANGE UP (ref 0.5–1.3)
CULTURE RESULTS: SIGNIFICANT CHANGE UP
GLUCOSE SERPL-MCNC: 132 MG/DL — HIGH (ref 70–99)
GRAM STN FLD: SIGNIFICANT CHANGE UP
HCT VFR BLD CALC: 29.6 % — LOW (ref 39–50)
HGB BLD-MCNC: 9.5 G/DL — LOW (ref 13–17)
MAGNESIUM SERPL-MCNC: 1.9 MG/DL — SIGNIFICANT CHANGE UP (ref 1.6–2.6)
MCHC RBC-ENTMCNC: 25.5 PG — LOW (ref 27–34)
MCHC RBC-ENTMCNC: 32.1 GM/DL — SIGNIFICANT CHANGE UP (ref 32–36)
MCV RBC AUTO: 79.4 FL — LOW (ref 80–100)
METHOD TYPE: SIGNIFICANT CHANGE UP
MRSA PCR RESULT.: SIGNIFICANT CHANGE UP
NRBC # BLD: 0 /100 WBCS — SIGNIFICANT CHANGE UP (ref 0–0)
ORGANISM # SPEC MICROSCOPIC CNT: SIGNIFICANT CHANGE UP
ORGANISM # SPEC MICROSCOPIC CNT: SIGNIFICANT CHANGE UP
PHOSPHATE SERPL-MCNC: 2 MG/DL — LOW (ref 2.5–4.5)
PLATELET # BLD AUTO: 188 K/UL — SIGNIFICANT CHANGE UP (ref 150–400)
POTASSIUM SERPL-MCNC: 3.4 MMOL/L — LOW (ref 3.5–5.3)
POTASSIUM SERPL-SCNC: 3.4 MMOL/L — LOW (ref 3.5–5.3)
RBC # BLD: 3.73 M/UL — LOW (ref 4.2–5.8)
RBC # FLD: 16.3 % — HIGH (ref 10.3–14.5)
S AUREUS DNA NOSE QL NAA+PROBE: DETECTED
SODIUM SERPL-SCNC: 141 MMOL/L — SIGNIFICANT CHANGE UP (ref 135–145)
SPECIMEN SOURCE: SIGNIFICANT CHANGE UP
WBC # BLD: 3.89 K/UL — SIGNIFICANT CHANGE UP (ref 3.8–10.5)
WBC # FLD AUTO: 3.89 K/UL — SIGNIFICANT CHANGE UP (ref 3.8–10.5)

## 2021-03-11 PROCEDURE — 36000 PLACE NEEDLE IN VEIN: CPT

## 2021-03-11 PROCEDURE — 99291 CRITICAL CARE FIRST HOUR: CPT

## 2021-03-11 RX ORDER — PHENOBARBITAL 60 MG
130 TABLET ORAL EVERY 6 HOURS
Refills: 0 | Status: DISCONTINUED | OUTPATIENT
Start: 2021-03-11 | End: 2021-03-15

## 2021-03-11 RX ORDER — POTASSIUM CHLORIDE 20 MEQ
20 PACKET (EA) ORAL ONCE
Refills: 0 | Status: COMPLETED | OUTPATIENT
Start: 2021-03-11 | End: 2021-03-11

## 2021-03-11 RX ORDER — POTASSIUM CHLORIDE 20 MEQ
10 PACKET (EA) ORAL
Refills: 0 | Status: COMPLETED | OUTPATIENT
Start: 2021-03-11 | End: 2021-03-11

## 2021-03-11 RX ORDER — POTASSIUM PHOSPHATE, MONOBASIC POTASSIUM PHOSPHATE, DIBASIC 236; 224 MG/ML; MG/ML
15 INJECTION, SOLUTION INTRAVENOUS ONCE
Refills: 0 | Status: COMPLETED | OUTPATIENT
Start: 2021-03-11 | End: 2021-03-11

## 2021-03-11 RX ORDER — MAGNESIUM SULFATE 500 MG/ML
2 VIAL (ML) INJECTION ONCE
Refills: 0 | Status: COMPLETED | OUTPATIENT
Start: 2021-03-11 | End: 2021-03-11

## 2021-03-11 RX ADMIN — Medication 2 MILLIGRAM(S): at 01:55

## 2021-03-11 RX ADMIN — Medication 1 TABLET(S): at 18:40

## 2021-03-11 RX ADMIN — AMPICILLIN SODIUM AND SULBACTAM SODIUM 200 GRAM(S): 250; 125 INJECTION, POWDER, FOR SUSPENSION INTRAMUSCULAR; INTRAVENOUS at 23:42

## 2021-03-11 RX ADMIN — Medication 1 MILLIGRAM(S): at 13:22

## 2021-03-11 RX ADMIN — DEXMEDETOMIDINE HYDROCHLORIDE IN 0.9% SODIUM CHLORIDE 13.2 MICROGRAM(S)/KG/HR: 4 INJECTION INTRAVENOUS at 18:32

## 2021-03-11 RX ADMIN — Medication 100 MILLIEQUIVALENT(S): at 06:19

## 2021-03-11 RX ADMIN — CHLORHEXIDINE GLUCONATE 1 APPLICATION(S): 213 SOLUTION TOPICAL at 08:59

## 2021-03-11 RX ADMIN — Medication 130 MILLIGRAM(S): at 06:20

## 2021-03-11 RX ADMIN — PANTOPRAZOLE SODIUM 40 MILLIGRAM(S): 20 TABLET, DELAYED RELEASE ORAL at 13:02

## 2021-03-11 RX ADMIN — DEXMEDETOMIDINE HYDROCHLORIDE IN 0.9% SODIUM CHLORIDE 13.2 MICROGRAM(S)/KG/HR: 4 INJECTION INTRAVENOUS at 20:26

## 2021-03-11 RX ADMIN — AMPICILLIN SODIUM AND SULBACTAM SODIUM 200 GRAM(S): 250; 125 INJECTION, POWDER, FOR SUSPENSION INTRAMUSCULAR; INTRAVENOUS at 06:19

## 2021-03-11 RX ADMIN — Medication 50 GRAM(S): at 10:30

## 2021-03-11 RX ADMIN — DEXMEDETOMIDINE HYDROCHLORIDE IN 0.9% SODIUM CHLORIDE 13.2 MICROGRAM(S)/KG/HR: 4 INJECTION INTRAVENOUS at 13:20

## 2021-03-11 RX ADMIN — DEXMEDETOMIDINE HYDROCHLORIDE IN 0.9% SODIUM CHLORIDE 13.2 MICROGRAM(S)/KG/HR: 4 INJECTION INTRAVENOUS at 22:46

## 2021-03-11 RX ADMIN — DEXTROSE MONOHYDRATE, SODIUM CHLORIDE, AND POTASSIUM CHLORIDE 100 MILLILITER(S): 50; .745; 4.5 INJECTION, SOLUTION INTRAVENOUS at 20:55

## 2021-03-11 RX ADMIN — Medication 130 MILLIGRAM(S): at 23:49

## 2021-03-11 RX ADMIN — DEXTROSE MONOHYDRATE, SODIUM CHLORIDE, AND POTASSIUM CHLORIDE 100 MILLILITER(S): 50; .745; 4.5 INJECTION, SOLUTION INTRAVENOUS at 11:00

## 2021-03-11 RX ADMIN — Medication 100 MILLIGRAM(S): at 15:01

## 2021-03-11 RX ADMIN — Medication 100 MILLIEQUIVALENT(S): at 10:48

## 2021-03-11 RX ADMIN — DEXMEDETOMIDINE HYDROCHLORIDE IN 0.9% SODIUM CHLORIDE 13.2 MICROGRAM(S)/KG/HR: 4 INJECTION INTRAVENOUS at 08:59

## 2021-03-11 RX ADMIN — DEXMEDETOMIDINE HYDROCHLORIDE IN 0.9% SODIUM CHLORIDE 13.2 MICROGRAM(S)/KG/HR: 4 INJECTION INTRAVENOUS at 01:02

## 2021-03-11 RX ADMIN — Medication 100 MILLIEQUIVALENT(S): at 09:03

## 2021-03-11 RX ADMIN — DEXMEDETOMIDINE HYDROCHLORIDE IN 0.9% SODIUM CHLORIDE 13.2 MICROGRAM(S)/KG/HR: 4 INJECTION INTRAVENOUS at 10:31

## 2021-03-11 RX ADMIN — AMPICILLIN SODIUM AND SULBACTAM SODIUM 200 GRAM(S): 250; 125 INJECTION, POWDER, FOR SUSPENSION INTRAMUSCULAR; INTRAVENOUS at 18:31

## 2021-03-11 RX ADMIN — ENOXAPARIN SODIUM 40 MILLIGRAM(S): 100 INJECTION SUBCUTANEOUS at 13:01

## 2021-03-11 RX ADMIN — Medication 130 MILLIGRAM(S): at 13:04

## 2021-03-11 RX ADMIN — DEXMEDETOMIDINE HYDROCHLORIDE IN 0.9% SODIUM CHLORIDE 13.2 MICROGRAM(S)/KG/HR: 4 INJECTION INTRAVENOUS at 06:32

## 2021-03-11 RX ADMIN — POTASSIUM PHOSPHATE, MONOBASIC POTASSIUM PHOSPHATE, DIBASIC 62.5 MILLIMOLE(S): 236; 224 INJECTION, SOLUTION INTRAVENOUS at 14:56

## 2021-03-11 RX ADMIN — DEXTROSE MONOHYDRATE, SODIUM CHLORIDE, AND POTASSIUM CHLORIDE 100 MILLILITER(S): 50; .745; 4.5 INJECTION, SOLUTION INTRAVENOUS at 01:42

## 2021-03-11 RX ADMIN — Medication 130 MILLIGRAM(S): at 18:31

## 2021-03-11 RX ADMIN — DEXMEDETOMIDINE HYDROCHLORIDE IN 0.9% SODIUM CHLORIDE 13.2 MICROGRAM(S)/KG/HR: 4 INJECTION INTRAVENOUS at 04:53

## 2021-03-11 RX ADMIN — DEXMEDETOMIDINE HYDROCHLORIDE IN 0.9% SODIUM CHLORIDE 13.2 MICROGRAM(S)/KG/HR: 4 INJECTION INTRAVENOUS at 15:57

## 2021-03-11 RX ADMIN — DEXMEDETOMIDINE HYDROCHLORIDE IN 0.9% SODIUM CHLORIDE 13.2 MICROGRAM(S)/KG/HR: 4 INJECTION INTRAVENOUS at 03:40

## 2021-03-11 RX ADMIN — AMPICILLIN SODIUM AND SULBACTAM SODIUM 200 GRAM(S): 250; 125 INJECTION, POWDER, FOR SUSPENSION INTRAMUSCULAR; INTRAVENOUS at 12:30

## 2021-03-11 NOTE — PROGRESS NOTE ADULT - ASSESSMENT
55M PMH long standing ETOH abuse w/frequent admissions for alcohol intoxication and withdrawal/DTs, GERD, HLD, alcoholic gastritis/esophagitis, PUD, hx of hypoxic respiratory failure requiring intubation due to aspiration PNA, most recently intubated April 2020 for hypoxic resp failure secondary to asp PNA with course complicated by septic shock and ROSA, and has had multiple admissions since then for abdominal pain secondary to alcoholic gastritis with hemorrhage with most recent admission one month ago 2/10-2/11, COVID-19 infection Dec 2020 presents now on 3/3 for nausea, abdominal pain. Found to have elevated Cr, lactate. Admitted for ROSA due to dehydration, alcoholic gastritis. Course complicated by DTs with CIWA 27. transferred to ICU 3/5 for DTs and sedation protocol for severe agitation. ICU course complicated by fevers, found to have staph PNA    1. NEURO  - remains on precedex drip  - required 2 prn phenobarbital and ativan for breakthrough agitation  - phenobarbital remains at 130mg q8 hrs will change and increased back to 130mg q6 hrs  - hope to wean off precedex drip  - pt with long standing hx of alcohol abuse and dependence with multiple admissions throughout the years and has not shown interest in alcohol cessation.  - cont MVI/Thamine/folic acid    2. PULM  - protecting airway  - end tidal Co2 for monitoring while being sedated    3. CV  - hemodynamically stable    4. GI  - hx of alcoholic gastritis  - cont with protonix  - no active bleeding at present time  - needs alcohol cessation    5. RENAL  - monitor and supplement electrolytes as needed  - initial lactate cleared: elevation likely due to underlying alcoholism    6. ID  - staph in sputum cx: cont with Unasyn (day #3)     7. GEN  - monitor and supplement electrolytes as needed: hypokalemia, hypophosphatemia     Critical Care Time: 35 min

## 2021-03-11 NOTE — PROGRESS NOTE ADULT - SUBJECTIVE AND OBJECTIVE BOX
24 hr events:  remains on precedex drip  overnight with breakthrough agitation requiring prn phenobarbital 130mg IVP x2 doses and ativan 2mg IVP x1  confused, delirious       ## ROS:  [x] unable to obtain due to sedation      ## Labs:  CBC:                        9.5    3.89  )-----------( 188      ( 11 Mar 2021 03:32 )             29.6     Chem:  03-11    141  |  111<H>  |  3<L>  ----------------------------<  132<H>  3.4<L>   |  22  |  0.91    Ca    8.6      11 Mar 2021 03:32  Phos  2.0     03-11  Mg     1.9     03-11      Culture - Sputum . (03.09.21 @ 08:59)    Specimen Source: .Sputum Sputum    Culture Results:     Numerous Staphylococcus aureus    Normal Respiratory Melanie present       Culture - Blood (03.08.21 @ 22:05)    Specimen Source: .Blood Blood    Culture Results: No growth to date.      ## Medications:  ampicillin/sulbactam  IVPB 3 Gram(s) IV Intermittent every 6 hours    metoprolol tartrate 25 milliGRAM(s) Oral two times a day      atorvastatin 20 milliGRAM(s) Oral at bedtime    enoxaparin Injectable 40 milliGRAM(s) SubCutaneous daily    pantoprazole  Injectable 40 milliGRAM(s) IV Push daily    dexMEDEtomidine Infusion 0.7 MICROgram(s)/kG/Hr IV Continuous <Continuous>  ondansetron Injectable 4 milliGRAM(s) IV Push every 6 hours PRN  PHENobarbital Injectable 130 milliGRAM(s) IV Push every 2 hours PRN  PHENobarbital Injectable 130 milliGRAM(s) IV Push every 6 hours      ## Vitals:  T(C): 37 (03-11-21 @ 19:45), Max: 37.9 (03-10-21 @ 23:09)  HR: 74 (03-11-21 @ 22:00) (67 - 92)  BP: 145/86 (03-11-21 @ 22:00) (103/70 - 145/86)  BP(mean): 100 (03-11-21 @ 22:00) (70 - 100)  RR: 17 (03-11-21 @ 22:00) (12 - 29)  SpO2: 97% (03-11-21 @ 22:00) (92% - 100%)        03-10 @ 07:01  -  03-11 @ 07:00  --------------------------------------------------------  IN: 3986.2 mL / OUT: 3625 mL / NET: 361.2 mL    03-11 @ 07:01 - 03-11 @ 23:03  --------------------------------------------------------  IN: 2254.4 mL / OUT: 1400 mL / NET: 854.4 mL          ## P/E:  Gen: lying comfortably in bed in no apparent distress, sedated  HEENT: PERRL  Resp: CTA B/L   CVS: RRR  Abd: soft NT/ND +BS  Ext: no c/c/e  Neuro: lethargic in the morning, awoke in afternoon confused, disoriented    CENTRAL LINE: [ ] YES [x ] NO  LOCATION:   DATE INSERTED:  REMOVE: [ ] YES [ ] NO      DUSTNI: [ ] YES [ ] NO    DATE INSERTED:  REMOVE:  [ ] YES [ ] NO      A-LINE:  [ ] YES [x ] NO  LOCATION:   DATE INSERTED:  REMOVE:  [ ] YES [ ] NO  EXPLAIN:    GLOBAL ISSUE/BEST PRACTICE:  Analgesia: n/a  Sedation: precedex  HOB elevation: yes  Stress ulcer prophylaxis: n/a  VTE prophylaxis: y  Oral Care: n/a  Glycemic control:  Nutrition: po diet    CODE STATUS: [ x] full code  [ ] DNR  [ ] DNI  [ ] [facility]  Goals of care discussion: [ ] yes

## 2021-03-12 LAB
ANION GAP SERPL CALC-SCNC: 6 MMOL/L — SIGNIFICANT CHANGE UP (ref 5–17)
BUN SERPL-MCNC: 4 MG/DL — LOW (ref 7–23)
CALCIUM SERPL-MCNC: 8.6 MG/DL — SIGNIFICANT CHANGE UP (ref 8.5–10.1)
CHLORIDE SERPL-SCNC: 110 MMOL/L — HIGH (ref 96–108)
CO2 SERPL-SCNC: 25 MMOL/L — SIGNIFICANT CHANGE UP (ref 22–31)
CREAT SERPL-MCNC: 0.99 MG/DL — SIGNIFICANT CHANGE UP (ref 0.5–1.3)
GLUCOSE SERPL-MCNC: 125 MG/DL — HIGH (ref 70–99)
HCT VFR BLD CALC: 32.2 % — LOW (ref 39–50)
HGB BLD-MCNC: 10.1 G/DL — LOW (ref 13–17)
MAGNESIUM SERPL-MCNC: 2.3 MG/DL — SIGNIFICANT CHANGE UP (ref 1.6–2.6)
MCHC RBC-ENTMCNC: 25.3 PG — LOW (ref 27–34)
MCHC RBC-ENTMCNC: 31.4 GM/DL — LOW (ref 32–36)
MCV RBC AUTO: 80.7 FL — SIGNIFICANT CHANGE UP (ref 80–100)
NRBC # BLD: 0 /100 WBCS — SIGNIFICANT CHANGE UP (ref 0–0)
PHOSPHATE SERPL-MCNC: 3.5 MG/DL — SIGNIFICANT CHANGE UP (ref 2.5–4.5)
PLATELET # BLD AUTO: 242 K/UL — SIGNIFICANT CHANGE UP (ref 150–400)
POTASSIUM SERPL-MCNC: 3.7 MMOL/L — SIGNIFICANT CHANGE UP (ref 3.5–5.3)
POTASSIUM SERPL-SCNC: 3.7 MMOL/L — SIGNIFICANT CHANGE UP (ref 3.5–5.3)
RBC # BLD: 3.99 M/UL — LOW (ref 4.2–5.8)
RBC # FLD: 16.3 % — HIGH (ref 10.3–14.5)
SODIUM SERPL-SCNC: 141 MMOL/L — SIGNIFICANT CHANGE UP (ref 135–145)
WBC # BLD: 2.91 K/UL — LOW (ref 3.8–10.5)
WBC # FLD AUTO: 2.91 K/UL — LOW (ref 3.8–10.5)

## 2021-03-12 PROCEDURE — 99291 CRITICAL CARE FIRST HOUR: CPT

## 2021-03-12 RX ORDER — CHLORHEXIDINE GLUCONATE 213 G/1000ML
1 SOLUTION TOPICAL DAILY
Refills: 0 | Status: DISCONTINUED | OUTPATIENT
Start: 2021-03-12 | End: 2021-03-14

## 2021-03-12 RX ORDER — HALOPERIDOL DECANOATE 100 MG/ML
5 INJECTION INTRAMUSCULAR ONCE
Refills: 0 | Status: COMPLETED | OUTPATIENT
Start: 2021-03-12 | End: 2021-03-12

## 2021-03-12 RX ORDER — POTASSIUM CHLORIDE 20 MEQ
40 PACKET (EA) ORAL ONCE
Refills: 0 | Status: COMPLETED | OUTPATIENT
Start: 2021-03-12 | End: 2021-03-12

## 2021-03-12 RX ORDER — PHENOBARBITAL 60 MG
130 TABLET ORAL ONCE
Refills: 0 | Status: DISCONTINUED | OUTPATIENT
Start: 2021-03-12 | End: 2021-03-12

## 2021-03-12 RX ORDER — QUETIAPINE FUMARATE 200 MG/1
25 TABLET, FILM COATED ORAL AT BEDTIME
Refills: 0 | Status: DISCONTINUED | OUTPATIENT
Start: 2021-03-12 | End: 2021-03-23

## 2021-03-12 RX ORDER — MIDAZOLAM HYDROCHLORIDE 1 MG/ML
2 INJECTION, SOLUTION INTRAMUSCULAR; INTRAVENOUS ONCE
Refills: 0 | Status: DISCONTINUED | OUTPATIENT
Start: 2021-03-12 | End: 2021-03-12

## 2021-03-12 RX ADMIN — Medication 130 MILLIGRAM(S): at 11:32

## 2021-03-12 RX ADMIN — HALOPERIDOL DECANOATE 5 MILLIGRAM(S): 100 INJECTION INTRAMUSCULAR at 18:34

## 2021-03-12 RX ADMIN — CHLORHEXIDINE GLUCONATE 1 APPLICATION(S): 213 SOLUTION TOPICAL at 08:27

## 2021-03-12 RX ADMIN — Medication 100 MILLIGRAM(S): at 11:33

## 2021-03-12 RX ADMIN — DEXMEDETOMIDINE HYDROCHLORIDE IN 0.9% SODIUM CHLORIDE 13.2 MICROGRAM(S)/KG/HR: 4 INJECTION INTRAVENOUS at 00:15

## 2021-03-12 RX ADMIN — AMPICILLIN SODIUM AND SULBACTAM SODIUM 200 GRAM(S): 250; 125 INJECTION, POWDER, FOR SUSPENSION INTRAMUSCULAR; INTRAVENOUS at 11:34

## 2021-03-12 RX ADMIN — Medication 130 MILLIGRAM(S): at 23:12

## 2021-03-12 RX ADMIN — Medication 40 MILLIEQUIVALENT(S): at 12:56

## 2021-03-12 RX ADMIN — Medication 1 TABLET(S): at 11:32

## 2021-03-12 RX ADMIN — DEXTROSE MONOHYDRATE, SODIUM CHLORIDE, AND POTASSIUM CHLORIDE 100 MILLILITER(S): 50; .745; 4.5 INJECTION, SOLUTION INTRAVENOUS at 06:49

## 2021-03-12 RX ADMIN — DEXMEDETOMIDINE HYDROCHLORIDE IN 0.9% SODIUM CHLORIDE 13.2 MICROGRAM(S)/KG/HR: 4 INJECTION INTRAVENOUS at 21:23

## 2021-03-12 RX ADMIN — ENOXAPARIN SODIUM 40 MILLIGRAM(S): 100 INJECTION SUBCUTANEOUS at 11:32

## 2021-03-12 RX ADMIN — Medication 130 MILLIGRAM(S): at 16:17

## 2021-03-12 RX ADMIN — Medication 1 MILLIGRAM(S): at 11:33

## 2021-03-12 RX ADMIN — AMPICILLIN SODIUM AND SULBACTAM SODIUM 200 GRAM(S): 250; 125 INJECTION, POWDER, FOR SUSPENSION INTRAMUSCULAR; INTRAVENOUS at 18:34

## 2021-03-12 RX ADMIN — DEXMEDETOMIDINE HYDROCHLORIDE IN 0.9% SODIUM CHLORIDE 13.2 MICROGRAM(S)/KG/HR: 4 INJECTION INTRAVENOUS at 18:33

## 2021-03-12 RX ADMIN — DEXMEDETOMIDINE HYDROCHLORIDE IN 0.9% SODIUM CHLORIDE 13.2 MICROGRAM(S)/KG/HR: 4 INJECTION INTRAVENOUS at 13:59

## 2021-03-12 RX ADMIN — AMPICILLIN SODIUM AND SULBACTAM SODIUM 200 GRAM(S): 250; 125 INJECTION, POWDER, FOR SUSPENSION INTRAMUSCULAR; INTRAVENOUS at 05:38

## 2021-03-12 RX ADMIN — PANTOPRAZOLE SODIUM 40 MILLIGRAM(S): 20 TABLET, DELAYED RELEASE ORAL at 11:32

## 2021-03-12 RX ADMIN — MIDAZOLAM HYDROCHLORIDE 2 MILLIGRAM(S): 1 INJECTION, SOLUTION INTRAMUSCULAR; INTRAVENOUS at 17:16

## 2021-03-12 RX ADMIN — DEXMEDETOMIDINE HYDROCHLORIDE IN 0.9% SODIUM CHLORIDE 13.2 MICROGRAM(S)/KG/HR: 4 INJECTION INTRAVENOUS at 10:06

## 2021-03-12 RX ADMIN — AMPICILLIN SODIUM AND SULBACTAM SODIUM 200 GRAM(S): 250; 125 INJECTION, POWDER, FOR SUSPENSION INTRAMUSCULAR; INTRAVENOUS at 23:12

## 2021-03-12 RX ADMIN — DEXMEDETOMIDINE HYDROCHLORIDE IN 0.9% SODIUM CHLORIDE 13.2 MICROGRAM(S)/KG/HR: 4 INJECTION INTRAVENOUS at 06:14

## 2021-03-12 RX ADMIN — Medication 130 MILLIGRAM(S): at 18:25

## 2021-03-12 RX ADMIN — DEXMEDETOMIDINE HYDROCHLORIDE IN 0.9% SODIUM CHLORIDE 13.2 MICROGRAM(S)/KG/HR: 4 INJECTION INTRAVENOUS at 03:10

## 2021-03-12 RX ADMIN — CHLORHEXIDINE GLUCONATE 1 APPLICATION(S): 213 SOLUTION TOPICAL at 12:57

## 2021-03-12 RX ADMIN — Medication 130 MILLIGRAM(S): at 05:38

## 2021-03-12 NOTE — PROCEDURE NOTE - NSPROCDETAILS_GEN_ALL_CORE
location identified, draped/prepped, sterile technique used
location identified, draped/prepped, sterile technique used/blood seen on insertion/dressing applied/flushes easily/secured in place
location identified, draped/prepped, sterile technique used/sterile dressing applied/sterile technique, catheter placed/supine position/ultrasound guidance

## 2021-03-12 NOTE — DIETITIAN NUTRITION RISK NOTIFICATION - TREATMENT: THE FOLLOWING DIET HAS BEEN RECOMMENDED
Diet, Soft:   Low Sodium  Supplement Feeding Modality:  Oral  Ensure Pudding Cans or Servings Per Day:  3       Frequency:  Daily (03-12-21 @ 11:21) [Pending Verification By Attending]  + sending Magic Cup 4 oz 3 x [id number] calories, 27 grams protein   Diet, Regular:   Low Sodium  Supplement Feeding Modality:  Oral  Ensure Clear Cans or Servings Per Day:  3       Frequency:  Daily (03-05-21 @ 12:00) [Active]

## 2021-03-12 NOTE — CHART NOTE - NSCHARTNOTEFT_GEN_A_CORE
Assessment:  Pt is alert, confused, fast asleep when seen.  Pt adm c dx of alcohol withdrawal, abdominal pain, s/p ROSA, alcoholic gastritis, DTs, fevers, staph pneumonia.   PMH include long standing ETOH abuse c frequent adm for alcohol intoxication & withdrawal/DTs, GERD, alcoholic gastritis, PUD, hypoxic respiratory failure requiring intubation due to aspiration pneumonia, septic shock, ROSA, COVID-19 infection 12/20,     Factors impacting intake: [ ] none [ ] nausea  [ ] vomiting [ ] diarrhea [ ] constipation  [ ]chewing problems [ x] swallowing issues? coughing c food due to AMS reported by RN [x ] other: acute illness, alcohol withdrawal, AMS    Diet Prescription: Diet, Regular:   Low Sodium  Supplement Feeding Modality:  Oral  Ensure Clear Cans or Servings Per Day:  3       Frequency:  Daily (03-05-21 @ 12:00)    Intake: ~50%, refusing Ensure clear, requested juice as per RN    Current Weight: 03/12, 77.4 kg, 03/04, 75.2 kg, c wt. gain of 2.2 kg   % Weight Change: 2.9%  03/12, 1+ generalized edema, 2+(mild) edema of arms, wrists & hands noted    Nutrition focused physical exam conducted; Subcutaneous fat Exam;  [ Mild  ]  Orbital fat pads region,  [ WNL ]Buccal fat region,  [ Unable  ]triceps region, [ Unable ]ribs region.  Muscle Exam; [ WNL  ]temples region, [ Mild  ]clavicle region, [ Mild   ]shoulder region, [ Unable  ]Scapula region, [  Unable, edema noted ]Interosseous region, [ Unable, appeared to have edema ]thigh region, [ Unable, appeared to have edema  ]Calf region    Pertinent Medications: MEDICATIONS  (STANDING):  ampicillin/sulbactam  IVPB 3 Gram(s) IV Intermittent every 6 hours  atorvastatin 20 milliGRAM(s) Oral at bedtime  chlorhexidine 2% Cloths 1 Application(s) Topical daily  dexMEDEtomidine Infusion 0.7 MICROgram(s)/kG/Hr (13.2 mL/Hr) IV Continuous <Continuous>  dextrose 5% + lactated ringers with potassium chloride 20 mEq/L 1000 milliLiter(s) (100 mL/Hr) IV Continuous <Continuous>  enoxaparin Injectable 40 milliGRAM(s) SubCutaneous daily  folic acid Injectable 1 milliGRAM(s) IV Push daily  metoprolol tartrate 25 milliGRAM(s) Oral two times a day  multivitamin 1 Tablet(s) Oral daily  pantoprazole  Injectable 40 milliGRAM(s) IV Push daily  PHENobarbital Injectable 130 milliGRAM(s) IV Push every 6 hours  thiamine Injectable 100 milliGRAM(s) IV Push daily    MEDICATIONS  (PRN):  ondansetron Injectable 4 milliGRAM(s) IV Push every 6 hours PRN Nausea and/or Vomiting  PHENobarbital Injectable 130 milliGRAM(s) IV Push every 2 hours PRN ciwa >8    Pertinent Labs: 03-12 Na141 mmol/L Glu 125 mg/dL<H> K+ 3.7 mmol/L Cr  0.99 mg/dL BUN 4 mg/dL<L> 03-12 Phos 3.5 mg/dL 03-08 Alb 3.1 g/dL<L>03-08 ALT 25 U/L AST 21 U/L Alkaline Phosphatase 46 U/L   CAPILLARY BLOOD GLUCOSE        Skin: WNL except WDL except ecchymosis     Estimated Needs:   [ x] no change since previous assessment(03/05/21)  [ ] recalculated:     Previous Nutrition Diagnosis:   Inadequate Oral Intake.     Etiology alcohol withdrawal.     Signs/Symptoms <75% nutrition needs > 3 days.     Goal/Expected Outcome pt to consume >75% of meals & supplement; not met   Nutrition Diagnosis is [x ] ongoing (see new related dx below) [ ] resolved [ ] not applicable     New Nutrition Diagnosis: (03/12/21)  [x ] Malnutrition; moderate malnutrition in context of acute illness     Related to: inadequate protein-energy intake in setting of alcohol withdrawal, ROSA, gastritis, staph pneumonia    As evidenced by: <75% nutrition needs > 7 days, 2+/mild edema of arms, hands     Goal: pt to consume >75% of meals & supplement       Interventions:   Recommend  [x] Change Diet To: Low sodium, soft  [x ] Nutrition Supplement; Ensure Pudding 4 oz 3x/day (510 lonnie, 12 gm pro) , will add Magic Cup 4 oz 3 x [id number] calories, 27 grams protein   [ ] Nutrition Support  [x ] Other: aspiration precautions, may benefit from swallow evaluation    Monitoring and Evaluation:   [x ] PO intake [ x ] Tolerance to diet prescription [ x ] weights [ x ] labs; Glucose [ x ] follow up per protocol  [ ] other: Assessment:  Pt is alert, confused, fast asleep when seen.  Pt adm c dx of alcohol withdrawal, abdominal pain, s/p ROSA, alcoholic gastritis, DTs, fevers, staph pneumonia.   PMH include long standing ETOH abuse c frequent adm for alcohol intoxication & withdrawal/DTs, GERD, alcoholic gastritis, PUD, hypoxic respiratory failure requiring intubation due to aspiration pneumonia, septic shock, ROSA, COVID-19 infection 12/20,     Factors impacting intake: [ ] none [ ] nausea  [ ] vomiting [ ] diarrhea [ ] constipation  [ ]chewing problems [ x] swallowing issues? coughing c food due to AMS reported by RN [x ] other: acute illness, alcohol withdrawal, AMS    Diet Prescription: Diet, Regular:   Low Sodium  Supplement Feeding Modality:  Oral  Ensure Clear Cans or Servings Per Day:  3       Frequency:  Daily (03-05-21 @ 12:00)    Intake: ~50%, refusing Ensure clear, requested juice as per RN    Current Weight: 03/12, 77.4 kg, 03/04, 75.2 kg, c wt. gain of 2.2 kg   % Weight Change: 2.9%  03/12, 1+ generalized edema, 2+(mild) edema of arms, wrists & hands noted    Nutrition focused physical exam conducted; Subcutaneous fat Exam;  [ Mild  ]  Orbital fat pads region,  [ WNL ]Buccal fat region,  [ Unable  ]triceps region, [ Unable ]ribs region.  Muscle Exam; [ WNL  ]temples region, [ Mild  ]clavicle region, [ Mild   ]shoulder region, [ Unable  ]Scapula region, [  Unable, edema noted ]Interosseous region, [ Unable, appeared to have edema ]thigh region, [ Unable, appeared to have edema  ]Calf region    Pertinent Medications: MEDICATIONS  (STANDING):  ampicillin/sulbactam  IVPB 3 Gram(s) IV Intermittent every 6 hours  atorvastatin 20 milliGRAM(s) Oral at bedtime  chlorhexidine 2% Cloths 1 Application(s) Topical daily  dexMEDEtomidine Infusion 0.7 MICROgram(s)/kG/Hr (13.2 mL/Hr) IV Continuous <Continuous>  dextrose 5% + lactated ringers with potassium chloride 20 mEq/L 1000 milliLiter(s) (100 mL/Hr) IV Continuous <Continuous>  enoxaparin Injectable 40 milliGRAM(s) SubCutaneous daily  folic acid Injectable 1 milliGRAM(s) IV Push daily  metoprolol tartrate 25 milliGRAM(s) Oral two times a day  multivitamin 1 Tablet(s) Oral daily  pantoprazole  Injectable 40 milliGRAM(s) IV Push daily  PHENobarbital Injectable 130 milliGRAM(s) IV Push every 6 hours  thiamine Injectable 100 milliGRAM(s) IV Push daily    MEDICATIONS  (PRN):  ondansetron Injectable 4 milliGRAM(s) IV Push every 6 hours PRN Nausea and/or Vomiting  PHENobarbital Injectable 130 milliGRAM(s) IV Push every 2 hours PRN ciwa >8    Pertinent Labs: 03-12 Na141 mmol/L Glu 125 mg/dL<H> K+ 3.7 mmol/L Cr  0.99 mg/dL BUN 4 mg/dL<L> 03-12 Phos 3.5 mg/dL 03-08 Alb 3.1 g/dL<L>03-08 ALT 25 U/L AST 21 U/L Alkaline Phosphatase 46 U/L   CAPILLARY BLOOD GLUCOSE        Skin: WNL except WDL except ecchymosis     Estimated Needs:   [ x] no change since previous assessment(03/05/21)  [ ] recalculated:     Previous Nutrition Diagnosis:   Inadequate Oral Intake.     Etiology alcohol withdrawal.     Signs/Symptoms <75% nutrition needs > 3 days.     Goal/Expected Outcome pt to consume >75% of meals & supplement; not met   Nutrition Diagnosis is [x ] ongoing (see new related dx below) [ ] resolved [ ] not applicable     New Nutrition Diagnosis: (03/12/21)  [x ] Malnutrition; moderate malnutrition in context of acute illness     Related to: inadequate protein-energy intake in setting of alcohol withdrawal, ROSA, gastritis, staph pneumonia    As evidenced by: <75% nutrition needs > 7 days, 2+/mild edema of arms, hands, mild fat/muscle loss    Goal: pt to consume >75% of meals & supplement       Interventions:   Recommend  [x] Change Diet To: Low sodium, soft  [x ] Nutrition Supplement; Ensure Pudding 4 oz 3x/day (510 lonnie, 12 gm pro) , will add Magic Cup 4 oz 3 x [id number] calories, 27 grams protein   [ ] Nutrition Support  [x ] Other: aspiration precautions, may benefit from swallow evaluation    Monitoring and Evaluation:   [x ] PO intake [ x ] Tolerance to diet prescription [ x ] weights [ x ] labs; Glucose [ x ] follow up per protocol  [ ] other:

## 2021-03-12 NOTE — PROGRESS NOTE ADULT - SUBJECTIVE AND OBJECTIVE BOX
55M PMH long standing ETOH abuse w/frequent admissions for alcohol intoxication and withdrawal/DTs, GERD, HLD, alcoholic gastritis/esophagitis, PUD, hx of hypoxic respiratory failure requiring intubation due to aspiration PNA, most recently intubated April 2020 for hypoxic resp failure secondary to asp PNA with course complicated by septic shock and ROSA, and has had multiple admissions since then for abdominal pain secondary to alcoholic gastritis with hemorrhage with most recent admission one month ago 2/10-2/11, COVID-19 infection Dec 2020 presents now on 3/3 for nausea, abdominal pain. Found to have elevated Cr, lactate. Admitted for ROSA due to dehydration, alcoholic gastritis. Course complicated by DTs with CIWA 27. transferred to ICU 3/5 for DTs and sedation protocol for severe agitation. ICU course complicated by fevers, found to have staph PNA    24 hr events  no acute events  remains on precedex: weaning down sedation  afebrile    [x] MEDICATION reviewed in EMR  [x] LABS reviewed    GEN: NAD, sedated but arousable  HEENT: PERRL, sclera white  CV: RRR  PULM: scatterred rhonchi bilaterally  ABD: soft  EXT: no edema, cyanosis      PLAN  1. NEURO  - remains on precedex drip  - cont to wean down precedex as tolerates  - less agitated since increase of phenobarb 130mg from q8 hrs back to q6 hrs  - cont MVI/Thamine/folic acid    2. PULM  - protecting airway  - end tidal Co2 for monitoring while being sedated    3. CV  - hemodynamically stable    4. GI  - hx of alcoholic gastritis  - cont with protonix  - needs alcohol cessation    5. RENAL  - monitor and supplement electrolytes as needed  - IVF    6. ID  - staph in sputum cx: cont with Unasyn     7. GEN  - full code    Critical Care Time: 32 min

## 2021-03-12 NOTE — PROCEDURE NOTE - NSSITEPREP_SKIN_A_CORE
chlorhexidine
chlorhexidine
alcohol/Adherence to aseptic technique: hand hygiene prior to donning barriers (gown, gloves), don cap and mask, sterile drape over patient

## 2021-03-13 LAB
ANION GAP SERPL CALC-SCNC: 9 MMOL/L — SIGNIFICANT CHANGE UP (ref 5–17)
BUN SERPL-MCNC: 5 MG/DL — LOW (ref 7–23)
CALCIUM SERPL-MCNC: 9 MG/DL — SIGNIFICANT CHANGE UP (ref 8.5–10.1)
CHLORIDE SERPL-SCNC: 107 MMOL/L — SIGNIFICANT CHANGE UP (ref 96–108)
CO2 SERPL-SCNC: 24 MMOL/L — SIGNIFICANT CHANGE UP (ref 22–31)
CREAT SERPL-MCNC: 0.98 MG/DL — SIGNIFICANT CHANGE UP (ref 0.5–1.3)
CULTURE RESULTS: SIGNIFICANT CHANGE UP
GLUCOSE SERPL-MCNC: 111 MG/DL — HIGH (ref 70–99)
HCT VFR BLD CALC: 33.1 % — LOW (ref 39–50)
HGB BLD-MCNC: 10.4 G/DL — LOW (ref 13–17)
MAGNESIUM SERPL-MCNC: 2.2 MG/DL — SIGNIFICANT CHANGE UP (ref 1.6–2.6)
MCHC RBC-ENTMCNC: 25.2 PG — LOW (ref 27–34)
MCHC RBC-ENTMCNC: 31.4 GM/DL — LOW (ref 32–36)
MCV RBC AUTO: 80.1 FL — SIGNIFICANT CHANGE UP (ref 80–100)
NRBC # BLD: 0 /100 WBCS — SIGNIFICANT CHANGE UP (ref 0–0)
PHOSPHATE SERPL-MCNC: 3.6 MG/DL — SIGNIFICANT CHANGE UP (ref 2.5–4.5)
PLATELET # BLD AUTO: 277 K/UL — SIGNIFICANT CHANGE UP (ref 150–400)
POTASSIUM SERPL-MCNC: 3.7 MMOL/L — SIGNIFICANT CHANGE UP (ref 3.5–5.3)
POTASSIUM SERPL-SCNC: 3.7 MMOL/L — SIGNIFICANT CHANGE UP (ref 3.5–5.3)
RBC # BLD: 4.13 M/UL — LOW (ref 4.2–5.8)
RBC # FLD: 16.1 % — HIGH (ref 10.3–14.5)
SODIUM SERPL-SCNC: 140 MMOL/L — SIGNIFICANT CHANGE UP (ref 135–145)
SPECIMEN SOURCE: SIGNIFICANT CHANGE UP
WBC # BLD: 3.97 K/UL — SIGNIFICANT CHANGE UP (ref 3.8–10.5)
WBC # FLD AUTO: 3.97 K/UL — SIGNIFICANT CHANGE UP (ref 3.8–10.5)

## 2021-03-13 PROCEDURE — 99291 CRITICAL CARE FIRST HOUR: CPT

## 2021-03-13 RX ORDER — SODIUM CHLORIDE 9 MG/ML
1000 INJECTION, SOLUTION INTRAVENOUS ONCE
Refills: 0 | Status: COMPLETED | OUTPATIENT
Start: 2021-03-13 | End: 2021-03-13

## 2021-03-13 RX ADMIN — DEXMEDETOMIDINE HYDROCHLORIDE IN 0.9% SODIUM CHLORIDE 13.2 MICROGRAM(S)/KG/HR: 4 INJECTION INTRAVENOUS at 13:28

## 2021-03-13 RX ADMIN — Medication 1 MILLIGRAM(S): at 11:20

## 2021-03-13 RX ADMIN — PANTOPRAZOLE SODIUM 40 MILLIGRAM(S): 20 TABLET, DELAYED RELEASE ORAL at 11:20

## 2021-03-13 RX ADMIN — CHLORHEXIDINE GLUCONATE 1 APPLICATION(S): 213 SOLUTION TOPICAL at 11:21

## 2021-03-13 RX ADMIN — ENOXAPARIN SODIUM 40 MILLIGRAM(S): 100 INJECTION SUBCUTANEOUS at 11:20

## 2021-03-13 RX ADMIN — QUETIAPINE FUMARATE 25 MILLIGRAM(S): 200 TABLET, FILM COATED ORAL at 23:00

## 2021-03-13 RX ADMIN — DEXMEDETOMIDINE HYDROCHLORIDE IN 0.9% SODIUM CHLORIDE 13.2 MICROGRAM(S)/KG/HR: 4 INJECTION INTRAVENOUS at 10:07

## 2021-03-13 RX ADMIN — AMPICILLIN SODIUM AND SULBACTAM SODIUM 200 GRAM(S): 250; 125 INJECTION, POWDER, FOR SUSPENSION INTRAMUSCULAR; INTRAVENOUS at 17:14

## 2021-03-13 RX ADMIN — Medication 130 MILLIGRAM(S): at 20:20

## 2021-03-13 RX ADMIN — Medication 130 MILLIGRAM(S): at 23:49

## 2021-03-13 RX ADMIN — Medication 1 TABLET(S): at 11:21

## 2021-03-13 RX ADMIN — AMPICILLIN SODIUM AND SULBACTAM SODIUM 200 GRAM(S): 250; 125 INJECTION, POWDER, FOR SUSPENSION INTRAMUSCULAR; INTRAVENOUS at 11:19

## 2021-03-13 RX ADMIN — SODIUM CHLORIDE 1000 MILLILITER(S): 9 INJECTION, SOLUTION INTRAVENOUS at 09:32

## 2021-03-13 RX ADMIN — Medication 100 MILLIGRAM(S): at 11:20

## 2021-03-13 RX ADMIN — Medication 130 MILLIGRAM(S): at 05:31

## 2021-03-13 RX ADMIN — Medication 130 MILLIGRAM(S): at 01:18

## 2021-03-13 RX ADMIN — DEXMEDETOMIDINE HYDROCHLORIDE IN 0.9% SODIUM CHLORIDE 13.2 MICROGRAM(S)/KG/HR: 4 INJECTION INTRAVENOUS at 21:30

## 2021-03-13 RX ADMIN — DEXMEDETOMIDINE HYDROCHLORIDE IN 0.9% SODIUM CHLORIDE 13.2 MICROGRAM(S)/KG/HR: 4 INJECTION INTRAVENOUS at 08:06

## 2021-03-13 RX ADMIN — AMPICILLIN SODIUM AND SULBACTAM SODIUM 200 GRAM(S): 250; 125 INJECTION, POWDER, FOR SUSPENSION INTRAMUSCULAR; INTRAVENOUS at 23:49

## 2021-03-13 RX ADMIN — DEXMEDETOMIDINE HYDROCHLORIDE IN 0.9% SODIUM CHLORIDE 13.2 MICROGRAM(S)/KG/HR: 4 INJECTION INTRAVENOUS at 03:47

## 2021-03-13 RX ADMIN — AMPICILLIN SODIUM AND SULBACTAM SODIUM 200 GRAM(S): 250; 125 INJECTION, POWDER, FOR SUSPENSION INTRAMUSCULAR; INTRAVENOUS at 05:21

## 2021-03-13 RX ADMIN — ATORVASTATIN CALCIUM 20 MILLIGRAM(S): 80 TABLET, FILM COATED ORAL at 23:00

## 2021-03-13 RX ADMIN — DEXMEDETOMIDINE HYDROCHLORIDE IN 0.9% SODIUM CHLORIDE 13.2 MICROGRAM(S)/KG/HR: 4 INJECTION INTRAVENOUS at 17:13

## 2021-03-13 RX ADMIN — DEXMEDETOMIDINE HYDROCHLORIDE IN 0.9% SODIUM CHLORIDE 13.2 MICROGRAM(S)/KG/HR: 4 INJECTION INTRAVENOUS at 05:32

## 2021-03-13 RX ADMIN — Medication 130 MILLIGRAM(S): at 11:21

## 2021-03-13 RX ADMIN — Medication 130 MILLIGRAM(S): at 17:13

## 2021-03-13 RX ADMIN — DEXMEDETOMIDINE HYDROCHLORIDE IN 0.9% SODIUM CHLORIDE 13.2 MICROGRAM(S)/KG/HR: 4 INJECTION INTRAVENOUS at 01:19

## 2021-03-13 NOTE — PROGRESS NOTE ADULT - SUBJECTIVE AND OBJECTIVE BOX
55M PMH long standing ETOH abuse w/frequent admissions for alcohol intoxication and withdrawal/DTs, GERD, HLD, alcoholic gastritis/esophagitis, PUD, hx of hypoxic respiratory failure requiring intubation due to aspiration PNA, most recently intubated April 2020 for hypoxic resp failure secondary to asp PNA with course complicated by septic shock and ROSA, and has had multiple admissions since then for abdominal pain secondary to alcoholic gastritis with hemorrhage with most recent admission one month ago 2/10-2/11, COVID-19 infection Dec 2020 presents now on 3/3 for nausea, abdominal pain. Found to have elevated Cr, lactate. Admitted for ROSA due to dehydration, alcoholic gastritis. Course complicated by DTs with CIWA 27. transferred to ICU 3/5 for DTs and sedation protocol for severe agitation. ICU course complicated by fevers, found to have staph PNA    24 hr events  poor peripheral IV access  new RUE midline inserted yesterday  remains confused  still with periods of agitation  required 1 prn breakthrough phenobarbital overnight  s/p 1L IVF bolus for hypotension with response in BP  remains on precedex      [x] MEDICATION reviewed in EMR  [x] LABS reviewed  [x] VITALS reviewed    GEN: NAD, sedated but arousable  HEENT: PERRL, sclera white  CV: RRR  PULM: CTA bilaterally  ABD: soft, + BS  EXT: no edema, cyanosis      PLAN  1. NEURO  - remains on precedex drip  - able to wean down on precedex throughout the day, cont titrating to off if possible  - less agitated since increase of phenobarb 130mg from q8 hrs to q6 hrs, will cont  - cont MVI/Thamine/folic acid      2. PULM  - end tidal Co2 for monitoring while being sedated  - cont unasyn (started on 3/10) for asp PNA / staph PNA    3. CV  - BP responded to IVF bolus  - monitor for further episodes of hypotension  - BP stable at present post fluid resuscitation    4. GI  - hx of alcoholic gastritis  - cont with protonix  - needs alcohol cessation    5. RENAL  - monitor and supplement electrolytes as needed      6. ID  - staph in sputum cx: cont with Unasyn     7. GEN  - full code    Critical Care Time: 35 min

## 2021-03-14 LAB
ALBUMIN SERPL ELPH-MCNC: 2.8 G/DL — LOW (ref 3.3–5)
ALP SERPL-CCNC: 44 U/L — SIGNIFICANT CHANGE UP (ref 40–120)
ALT FLD-CCNC: 39 U/L — SIGNIFICANT CHANGE UP (ref 12–78)
ANION GAP SERPL CALC-SCNC: 8 MMOL/L — SIGNIFICANT CHANGE UP (ref 5–17)
ANISOCYTOSIS BLD QL: SLIGHT — SIGNIFICANT CHANGE UP
AST SERPL-CCNC: 38 U/L — HIGH (ref 15–37)
BASOPHILS # BLD AUTO: 0 K/UL — SIGNIFICANT CHANGE UP (ref 0–0.2)
BASOPHILS NFR BLD AUTO: 0 % — SIGNIFICANT CHANGE UP (ref 0–2)
BILIRUB SERPL-MCNC: 0.2 MG/DL — SIGNIFICANT CHANGE UP (ref 0.2–1.2)
BUN SERPL-MCNC: 11 MG/DL — SIGNIFICANT CHANGE UP (ref 7–23)
CALCIUM SERPL-MCNC: 8.8 MG/DL — SIGNIFICANT CHANGE UP (ref 8.5–10.1)
CHLORIDE SERPL-SCNC: 107 MMOL/L — SIGNIFICANT CHANGE UP (ref 96–108)
CO2 SERPL-SCNC: 24 MMOL/L — SIGNIFICANT CHANGE UP (ref 22–31)
CREAT SERPL-MCNC: 1.12 MG/DL — SIGNIFICANT CHANGE UP (ref 0.5–1.3)
CULTURE RESULTS: SIGNIFICANT CHANGE UP
ELLIPTOCYTES BLD QL SMEAR: SLIGHT — SIGNIFICANT CHANGE UP
EOSINOPHIL # BLD AUTO: 0.13 K/UL — SIGNIFICANT CHANGE UP (ref 0–0.5)
EOSINOPHIL NFR BLD AUTO: 3 % — SIGNIFICANT CHANGE UP (ref 0–6)
GLUCOSE SERPL-MCNC: 91 MG/DL — SIGNIFICANT CHANGE UP (ref 70–99)
HCT VFR BLD CALC: 33.9 % — LOW (ref 39–50)
HGB BLD-MCNC: 10.9 G/DL — LOW (ref 13–17)
LYMPHOCYTES # BLD AUTO: 1.72 K/UL — SIGNIFICANT CHANGE UP (ref 1–3.3)
LYMPHOCYTES # BLD AUTO: 39 % — SIGNIFICANT CHANGE UP (ref 13–44)
MACROCYTES BLD QL: SLIGHT — SIGNIFICANT CHANGE UP
MAGNESIUM SERPL-MCNC: 2.2 MG/DL — SIGNIFICANT CHANGE UP (ref 1.6–2.6)
MANUAL SMEAR VERIFICATION: SIGNIFICANT CHANGE UP
MCHC RBC-ENTMCNC: 25.2 PG — LOW (ref 27–34)
MCHC RBC-ENTMCNC: 32.2 GM/DL — SIGNIFICANT CHANGE UP (ref 32–36)
MCV RBC AUTO: 78.3 FL — LOW (ref 80–100)
MICROCYTES BLD QL: SLIGHT — SIGNIFICANT CHANGE UP
MONOCYTES # BLD AUTO: 0.53 K/UL — SIGNIFICANT CHANGE UP (ref 0–0.9)
MONOCYTES NFR BLD AUTO: 12 % — SIGNIFICANT CHANGE UP (ref 2–14)
NEUTROPHILS # BLD AUTO: 1.63 K/UL — LOW (ref 1.8–7.4)
NEUTROPHILS NFR BLD AUTO: 37 % — LOW (ref 43–77)
NRBC # BLD: 0 /100 — SIGNIFICANT CHANGE UP (ref 0–0)
NRBC # BLD: SIGNIFICANT CHANGE UP /100 WBCS (ref 0–0)
PHOSPHATE SERPL-MCNC: 4.3 MG/DL — SIGNIFICANT CHANGE UP (ref 2.5–4.5)
PLAT MORPH BLD: NORMAL — SIGNIFICANT CHANGE UP
PLATELET # BLD AUTO: 344 K/UL — SIGNIFICANT CHANGE UP (ref 150–400)
POTASSIUM SERPL-MCNC: 4 MMOL/L — SIGNIFICANT CHANGE UP (ref 3.5–5.3)
POTASSIUM SERPL-SCNC: 4 MMOL/L — SIGNIFICANT CHANGE UP (ref 3.5–5.3)
PROT SERPL-MCNC: 7.7 GM/DL — SIGNIFICANT CHANGE UP (ref 6–8.3)
RBC # BLD: 4.33 M/UL — SIGNIFICANT CHANGE UP (ref 4.2–5.8)
RBC # FLD: 16.4 % — HIGH (ref 10.3–14.5)
RBC BLD AUTO: ABNORMAL
SCHISTOCYTES BLD QL AUTO: SLIGHT — SIGNIFICANT CHANGE UP
SODIUM SERPL-SCNC: 139 MMOL/L — SIGNIFICANT CHANGE UP (ref 135–145)
SPECIMEN SOURCE: SIGNIFICANT CHANGE UP
TARGETS BLD QL SMEAR: SLIGHT — SIGNIFICANT CHANGE UP
TOXIC GRANULES BLD QL SMEAR: PRESENT — SIGNIFICANT CHANGE UP
VARIANT LYMPHS # BLD: 9 % — HIGH (ref 0–6)
WBC # BLD: 4.4 K/UL — SIGNIFICANT CHANGE UP (ref 3.8–10.5)
WBC # FLD AUTO: 4.4 K/UL — SIGNIFICANT CHANGE UP (ref 3.8–10.5)

## 2021-03-14 PROCEDURE — 99291 CRITICAL CARE FIRST HOUR: CPT

## 2021-03-14 RX ORDER — CHLORHEXIDINE GLUCONATE 213 G/1000ML
1 SOLUTION TOPICAL DAILY
Refills: 0 | Status: DISCONTINUED | OUTPATIENT
Start: 2021-03-14 | End: 2021-03-15

## 2021-03-14 RX ADMIN — DEXMEDETOMIDINE HYDROCHLORIDE IN 0.9% SODIUM CHLORIDE 13.2 MICROGRAM(S)/KG/HR: 4 INJECTION INTRAVENOUS at 14:16

## 2021-03-14 RX ADMIN — Medication 130 MILLIGRAM(S): at 11:38

## 2021-03-14 RX ADMIN — DEXMEDETOMIDINE HYDROCHLORIDE IN 0.9% SODIUM CHLORIDE 13.2 MICROGRAM(S)/KG/HR: 4 INJECTION INTRAVENOUS at 09:14

## 2021-03-14 RX ADMIN — AMPICILLIN SODIUM AND SULBACTAM SODIUM 200 GRAM(S): 250; 125 INJECTION, POWDER, FOR SUSPENSION INTRAMUSCULAR; INTRAVENOUS at 17:14

## 2021-03-14 RX ADMIN — Medication 130 MILLIGRAM(S): at 05:52

## 2021-03-14 RX ADMIN — Medication 1 TABLET(S): at 11:37

## 2021-03-14 RX ADMIN — DEXMEDETOMIDINE HYDROCHLORIDE IN 0.9% SODIUM CHLORIDE 13.2 MICROGRAM(S)/KG/HR: 4 INJECTION INTRAVENOUS at 05:51

## 2021-03-14 RX ADMIN — Medication 130 MILLIGRAM(S): at 04:18

## 2021-03-14 RX ADMIN — PANTOPRAZOLE SODIUM 40 MILLIGRAM(S): 20 TABLET, DELAYED RELEASE ORAL at 11:40

## 2021-03-14 RX ADMIN — ENOXAPARIN SODIUM 40 MILLIGRAM(S): 100 INJECTION SUBCUTANEOUS at 11:37

## 2021-03-14 RX ADMIN — AMPICILLIN SODIUM AND SULBACTAM SODIUM 200 GRAM(S): 250; 125 INJECTION, POWDER, FOR SUSPENSION INTRAMUSCULAR; INTRAVENOUS at 05:51

## 2021-03-14 RX ADMIN — DEXMEDETOMIDINE HYDROCHLORIDE IN 0.9% SODIUM CHLORIDE 13.2 MICROGRAM(S)/KG/HR: 4 INJECTION INTRAVENOUS at 01:37

## 2021-03-14 RX ADMIN — CHLORHEXIDINE GLUCONATE 1 APPLICATION(S): 213 SOLUTION TOPICAL at 15:09

## 2021-03-14 RX ADMIN — AMPICILLIN SODIUM AND SULBACTAM SODIUM 200 GRAM(S): 250; 125 INJECTION, POWDER, FOR SUSPENSION INTRAMUSCULAR; INTRAVENOUS at 11:37

## 2021-03-14 RX ADMIN — Medication 1 MILLIGRAM(S): at 13:03

## 2021-03-14 RX ADMIN — QUETIAPINE FUMARATE 25 MILLIGRAM(S): 200 TABLET, FILM COATED ORAL at 21:58

## 2021-03-14 RX ADMIN — Medication 130 MILLIGRAM(S): at 17:15

## 2021-03-14 RX ADMIN — Medication 100 MILLIGRAM(S): at 11:38

## 2021-03-14 RX ADMIN — DEXMEDETOMIDINE HYDROCHLORIDE IN 0.9% SODIUM CHLORIDE 13.2 MICROGRAM(S)/KG/HR: 4 INJECTION INTRAVENOUS at 21:58

## 2021-03-14 RX ADMIN — ATORVASTATIN CALCIUM 20 MILLIGRAM(S): 80 TABLET, FILM COATED ORAL at 21:58

## 2021-03-14 NOTE — PHYSICAL THERAPY INITIAL EVALUATION ADULT - ADDITIONAL COMMENTS
Per chart review, pt lives with family in a house and was independent prior to admission. 3 steps to enter house.

## 2021-03-14 NOTE — PROGRESS NOTE ADULT - SUBJECTIVE AND OBJECTIVE BOX
55M PMH long standing ETOH abuse w/frequent admissions for alcohol intoxication and withdrawal/DTs, GERD, HLD, alcoholic gastritis/esophagitis, PUD, hx of hypoxic respiratory failure requiring intubation due to aspiration PNA, most recently intubated April 2020 for hypoxic resp failure secondary to asp PNA with course complicated by septic shock and ROSA, and has had multiple admissions since then for abdominal pain secondary to alcoholic gastritis with hemorrhage with most recent admission one month ago 2/10-2/11, COVID-19 infection Dec 2020 presents now on 3/3 for nausea, abdominal pain. Found to have elevated Cr, lactate. Admitted for ROSA due to dehydration, alcoholic gastritis. Course complicated by DTs with CIWA 27. transferred to ICU 3/5 for DTs and sedation protocol for severe agitation. ICU course complicated by fevers, found to have staph PNA    24 hr events  remains on precedex drip but lower dosages  s/p 2 prn phenobarbital breakthrough doses for agitation  on seroquel  eating with assistance  no other acute events      [x] MEDICATION reviewed in EMR  [x] LABS reviewed  [x] VITALS reviewed    GEN: NAD, calm on sedation, arousable  HEENT: PERRL, sclera white, mucosa moist  CV: RRR  PULM: CTA bilaterally  ABD: soft, + BS, ND  EXT: no edema, cyanosis; RUE midline in place- site clean and intact      PLAN  1. NEURO  - remains on precedex drip, cont titrating down   - cont standing phenobarbital 130mg q6 hrs  - cont with seroquel for underlying delirium  - cont MVI/Thamine/folic acid      2. PULM  - cont unasyn (started on 3/10) for asp PNA / staph PNA, to complete a 5 day course    3. CV  - hemodynamically stable    4. GI  - hx of alcoholic gastritis  - cont with protonix  - needs alcohol cessation    5. RENAL  - monitor and supplement electrolytes as needed    6. ID  - staph in sputum cx: cont with Unasyn     7. GEN  - full code    Critical Care Time: 32 min

## 2021-03-15 LAB
ALBUMIN SERPL ELPH-MCNC: 2.8 G/DL — LOW (ref 3.3–5)
ALP SERPL-CCNC: 41 U/L — SIGNIFICANT CHANGE UP (ref 40–120)
ALT FLD-CCNC: 35 U/L — SIGNIFICANT CHANGE UP (ref 12–78)
ANION GAP SERPL CALC-SCNC: 8 MMOL/L — SIGNIFICANT CHANGE UP (ref 5–17)
AST SERPL-CCNC: 23 U/L — SIGNIFICANT CHANGE UP (ref 15–37)
BILIRUB SERPL-MCNC: 0.1 MG/DL — LOW (ref 0.2–1.2)
BUN SERPL-MCNC: 11 MG/DL — SIGNIFICANT CHANGE UP (ref 7–23)
CALCIUM SERPL-MCNC: 8.7 MG/DL — SIGNIFICANT CHANGE UP (ref 8.5–10.1)
CHLORIDE SERPL-SCNC: 106 MMOL/L — SIGNIFICANT CHANGE UP (ref 96–108)
CO2 SERPL-SCNC: 25 MMOL/L — SIGNIFICANT CHANGE UP (ref 22–31)
CREAT SERPL-MCNC: 1 MG/DL — SIGNIFICANT CHANGE UP (ref 0.5–1.3)
GLUCOSE SERPL-MCNC: 81 MG/DL — SIGNIFICANT CHANGE UP (ref 70–99)
HCT VFR BLD CALC: 32.6 % — LOW (ref 39–50)
HGB BLD-MCNC: 10.2 G/DL — LOW (ref 13–17)
MAGNESIUM SERPL-MCNC: 2.3 MG/DL — SIGNIFICANT CHANGE UP (ref 1.6–2.6)
MCHC RBC-ENTMCNC: 25.3 PG — LOW (ref 27–34)
MCHC RBC-ENTMCNC: 31.3 GM/DL — LOW (ref 32–36)
MCV RBC AUTO: 80.9 FL — SIGNIFICANT CHANGE UP (ref 80–100)
NRBC # BLD: 0 /100 WBCS — SIGNIFICANT CHANGE UP (ref 0–0)
PHOSPHATE SERPL-MCNC: 3.4 MG/DL — SIGNIFICANT CHANGE UP (ref 2.5–4.5)
PLATELET # BLD AUTO: 380 K/UL — SIGNIFICANT CHANGE UP (ref 150–400)
POTASSIUM SERPL-MCNC: 4 MMOL/L — SIGNIFICANT CHANGE UP (ref 3.5–5.3)
POTASSIUM SERPL-SCNC: 4 MMOL/L — SIGNIFICANT CHANGE UP (ref 3.5–5.3)
PROT SERPL-MCNC: 7.4 GM/DL — SIGNIFICANT CHANGE UP (ref 6–8.3)
RBC # BLD: 4.03 M/UL — LOW (ref 4.2–5.8)
RBC # FLD: 16.5 % — HIGH (ref 10.3–14.5)
SODIUM SERPL-SCNC: 139 MMOL/L — SIGNIFICANT CHANGE UP (ref 135–145)
WBC # BLD: 5.43 K/UL — SIGNIFICANT CHANGE UP (ref 3.8–10.5)
WBC # FLD AUTO: 5.43 K/UL — SIGNIFICANT CHANGE UP (ref 3.8–10.5)

## 2021-03-15 PROCEDURE — 99291 CRITICAL CARE FIRST HOUR: CPT

## 2021-03-15 RX ADMIN — Medication 25 MILLIGRAM(S): at 18:21

## 2021-03-15 RX ADMIN — AMPICILLIN SODIUM AND SULBACTAM SODIUM 200 GRAM(S): 250; 125 INJECTION, POWDER, FOR SUSPENSION INTRAMUSCULAR; INTRAVENOUS at 12:44

## 2021-03-15 RX ADMIN — Medication 1 MILLIGRAM(S): at 15:29

## 2021-03-15 RX ADMIN — Medication 75 MILLIGRAM(S): at 13:34

## 2021-03-15 RX ADMIN — Medication 100 MILLIGRAM(S): at 13:33

## 2021-03-15 RX ADMIN — DEXMEDETOMIDINE HYDROCHLORIDE IN 0.9% SODIUM CHLORIDE 13.2 MICROGRAM(S)/KG/HR: 4 INJECTION INTRAVENOUS at 15:08

## 2021-03-15 RX ADMIN — Medication 75 MILLIGRAM(S): at 21:27

## 2021-03-15 RX ADMIN — DEXMEDETOMIDINE HYDROCHLORIDE IN 0.9% SODIUM CHLORIDE 13.2 MICROGRAM(S)/KG/HR: 4 INJECTION INTRAVENOUS at 03:00

## 2021-03-15 RX ADMIN — AMPICILLIN SODIUM AND SULBACTAM SODIUM 200 GRAM(S): 250; 125 INJECTION, POWDER, FOR SUSPENSION INTRAMUSCULAR; INTRAVENOUS at 18:32

## 2021-03-15 RX ADMIN — QUETIAPINE FUMARATE 25 MILLIGRAM(S): 200 TABLET, FILM COATED ORAL at 21:27

## 2021-03-15 RX ADMIN — AMPICILLIN SODIUM AND SULBACTAM SODIUM 200 GRAM(S): 250; 125 INJECTION, POWDER, FOR SUSPENSION INTRAMUSCULAR; INTRAVENOUS at 00:16

## 2021-03-15 RX ADMIN — Medication 130 MILLIGRAM(S): at 06:37

## 2021-03-15 RX ADMIN — Medication 130 MILLIGRAM(S): at 00:16

## 2021-03-15 RX ADMIN — AMPICILLIN SODIUM AND SULBACTAM SODIUM 200 GRAM(S): 250; 125 INJECTION, POWDER, FOR SUSPENSION INTRAMUSCULAR; INTRAVENOUS at 06:37

## 2021-03-15 RX ADMIN — CHLORHEXIDINE GLUCONATE 1 APPLICATION(S): 213 SOLUTION TOPICAL at 10:00

## 2021-03-15 RX ADMIN — Medication 1 TABLET(S): at 12:44

## 2021-03-15 RX ADMIN — ENOXAPARIN SODIUM 40 MILLIGRAM(S): 100 INJECTION SUBCUTANEOUS at 12:44

## 2021-03-15 RX ADMIN — DEXMEDETOMIDINE HYDROCHLORIDE IN 0.9% SODIUM CHLORIDE 13.2 MICROGRAM(S)/KG/HR: 4 INJECTION INTRAVENOUS at 08:08

## 2021-03-15 RX ADMIN — ATORVASTATIN CALCIUM 20 MILLIGRAM(S): 80 TABLET, FILM COATED ORAL at 21:27

## 2021-03-15 NOTE — PROGRESS NOTE ADULT - SUBJECTIVE AND OBJECTIVE BOX
HPI:  55 year old male admitted with nausea and vomiting for past few days. Long history of ETOH abuse and DT. Denies ETOH use for past  24 hour. No CP, fevers, HA, abdominal pain, melena, BRBPR or hematuria.     On arrival had ROSA from dehydration.    (03 Mar 2021 16:37)      24 hr events:    ## ROS:  [ ] unable to obtain  CONSTITUTIONAL: No fever, weight loss, or fatigue  EYES: No eye pain, visual disturbances, or discharge  ENMT:  No difficulty hearing, tinnitus, vertigo; No sinus or throat pain  NECK: No pain or stiffness  RESPIRATORY: No cough, wheezing, chills or hemoptysis; No shortness of breath  CARDIOVASCULAR: No chest pain, palpitations, dizziness, or leg swelling  GASTROINTESTINAL: No abdominal or epigastric pain. No nausea, vomiting, or hematemesis; No diarrhea or constipation. No melena or hematochezia.  GENITOURINARY: No dysuria, frequency, hematuria, or incontinence  NEUROLOGICAL: No headaches, memory loss, loss of strength, numbness, or tremors  SKIN: No itching, burning, rashes, or lesions   LYMPH NODES: No enlarged glands  ENDOCRINE: No heat or cold intolerance; No hair loss  MUSCULOSKELETAL: No joint pain or swelling; No muscle, back, or extremity pain  PSYCHIATRIC: No depression, anxiety, mood swings, or difficulty sleeping  HEME/LYMPH: No easy bruising, or bleeding gums  ALLERGY AND IMMUNOLOGIC: No hives or eczema    ## Vitals  ICU Vital Signs Last 24 Hrs  T(C): 36.9 (15 Mar 2021 01:00), Max: 36.9 (14 Mar 2021 19:08)  T(F): 98.4 (15 Mar 2021 01:00), Max: 98.5 (14 Mar 2021 19:08)  HR: 61 (15 Mar 2021 07:20) (52 - 75)  BP: 119/66 (15 Mar 2021 06:00) (87/31 - 122/63)  BP(mean): 77 (15 Mar 2021 06:00) (46 - 77)  ABP: --  ABP(mean): --  RR: 15 (15 Mar 2021 07:20) (7 - 21)  SpO2: 82% (15 Mar 2021 07:20) (81% - 100%)      ## Physical Exam:  Gen:  HEENT:  Resp:  CV:  Abd:  Ext:  Neuro:    ## Vent Data      ## Labs:  Chem:  03-15    139  |  106  |  11  ----------------------------<  81  4.0   |  25  |  1.00    Ca    8.7      15 Mar 2021 04:59  Phos  3.4     03-15  Mg     2.3     03-15    TPro  7.4  /  Alb  2.8<L>  /  TBili  0.1<L>  /  DBili  x   /  AST  23  /  ALT  35  /  AlkPhos  41  03-15    LIVER FUNCTIONS - ( 15 Mar 2021 04:59 )  Alb: 2.8 g/dL / Pro: 7.4 gm/dL / ALK PHOS: 41 U/L / ALT: 35 U/L / AST: 23 U/L / GGT: x           CBC:                        10.2   5.43  )-----------( 380      ( 15 Mar 2021 04:59 )             32.6     Coags:          ## Cardiac        ## Blood Gas      #I/Os  I&O's Detail    14 Mar 2021 07:01  -  15 Mar 2021 07:00  --------------------------------------------------------  IN:    Dexmedetomidine: 216 mL    IV PiggyBack: 100 mL    Oral Fluid: 340 mL  Total IN: 656 mL    OUT:    Incontinent per Condom Catheter (mL): 2600 mL  Total OUT: 2600 mL    Total NET: -1944 mL          ## Imaging:    ## Medications:  MEDICATIONS  (STANDING):  ampicillin/sulbactam  IVPB 3 Gram(s) IV Intermittent every 6 hours  atorvastatin 20 milliGRAM(s) Oral at bedtime  chlorhexidine 4% Liquid 1 Application(s) Topical daily  dexMEDEtomidine Infusion 0.7 MICROgram(s)/kG/Hr (13.2 mL/Hr) IV Continuous <Continuous>  enoxaparin Injectable 40 milliGRAM(s) SubCutaneous daily  folic acid Injectable 1 milliGRAM(s) IV Push daily  metoprolol tartrate 25 milliGRAM(s) Oral two times a day  multivitamin 1 Tablet(s) Oral daily  PHENobarbital Injectable 130 milliGRAM(s) IV Push every 6 hours  QUEtiapine 25 milliGRAM(s) Oral at bedtime  thiamine Injectable 100 milliGRAM(s) IV Push daily    MEDICATIONS  (PRN):  ondansetron Injectable 4 milliGRAM(s) IV Push every 6 hours PRN Nausea and/or Vomiting  PHENobarbital Injectable 130 milliGRAM(s) IV Push every 2 hours PRN ciwa >8       HPI:  55 year old male admitted with nausea and vomiting for past few days. Long history of ETOH abuse and DT. Denies ETOH use for past  24 hour. No CP, fevers, HA, abdominal pain, melena, BRBPR or hematuria.     On arrival had ROSA from dehydration.    (03 Mar 2021 16:37)      24 hr events: No acute events. Remains on precedex gtt. Reports no pain, dyspnea, anxiety, fevers, chills, nausea, emesis, or diarrhea.    ## ROS:  See above. ROS otherwise negative.    ## Vitals  ICU Vital Signs Last 24 Hrs  T(C): 36.9 (15 Mar 2021 01:00), Max: 36.9 (14 Mar 2021 19:08)  T(F): 98.4 (15 Mar 2021 01:00), Max: 98.5 (14 Mar 2021 19:08)  HR: 61 (15 Mar 2021 07:20) (52 - 75)  BP: 119/66 (15 Mar 2021 06:00) (87/31 - 122/63)  BP(mean): 77 (15 Mar 2021 06:00) (46 - 77)  ABP: --  ABP(mean): --  RR: 15 (15 Mar 2021 07:20) (7 - 21)  SpO2: 82% (15 Mar 2021 07:20) (81% - 100%)      ## Physical Exam:  Gen: Adult male lying in bed, NAD  HEENT: NC/AT sclerae anictreric  Resp: No increased WOB, CTAB  CV: S1, S2  Abd: Soft, + BS  Ext: WWP  Neuro: sedated, arousable to voice, responds to questions, moves all extremities    ## Vent Data      ## Labs:  Chem:  03-15    139  |  106  |  11  ----------------------------<  81  4.0   |  25  |  1.00    Ca    8.7      15 Mar 2021 04:59  Phos  3.4     03-15  Mg     2.3     03-15    TPro  7.4  /  Alb  2.8<L>  /  TBili  0.1<L>  /  DBili  x   /  AST  23  /  ALT  35  /  AlkPhos  41  03-15    LIVER FUNCTIONS - ( 15 Mar 2021 04:59 )  Alb: 2.8 g/dL / Pro: 7.4 gm/dL / ALK PHOS: 41 U/L / ALT: 35 U/L / AST: 23 U/L / GGT: x           CBC:                        10.2   5.43  )-----------( 380      ( 15 Mar 2021 04:59 )             32.6     Coags:          ## Cardiac        ## Blood Gas      #I/Os  I&O's Detail    14 Mar 2021 07:01  -  15 Mar 2021 07:00  --------------------------------------------------------  IN:    Dexmedetomidine: 216 mL    IV PiggyBack: 100 mL    Oral Fluid: 340 mL  Total IN: 656 mL    OUT:    Incontinent per Condom Catheter (mL): 2600 mL  Total OUT: 2600 mL    Total NET: -1944 mL          ## Imaging:    ## Medications:  MEDICATIONS  (STANDING):  ampicillin/sulbactam  IVPB 3 Gram(s) IV Intermittent every 6 hours  atorvastatin 20 milliGRAM(s) Oral at bedtime  chlorhexidine 4% Liquid 1 Application(s) Topical daily  dexMEDEtomidine Infusion 0.7 MICROgram(s)/kG/Hr (13.2 mL/Hr) IV Continuous <Continuous>  enoxaparin Injectable 40 milliGRAM(s) SubCutaneous daily  folic acid Injectable 1 milliGRAM(s) IV Push daily  metoprolol tartrate 25 milliGRAM(s) Oral two times a day  multivitamin 1 Tablet(s) Oral daily  PHENobarbital Injectable 130 milliGRAM(s) IV Push every 6 hours  QUEtiapine 25 milliGRAM(s) Oral at bedtime  thiamine Injectable 100 milliGRAM(s) IV Push daily    MEDICATIONS  (PRN):  ondansetron Injectable 4 milliGRAM(s) IV Push every 6 hours PRN Nausea and/or Vomiting  PHENobarbital Injectable 130 milliGRAM(s) IV Push every 2 hours PRN ciwa >8

## 2021-03-15 NOTE — CHART NOTE - NSCHARTNOTEFT_GEN_A_CORE
54 y/o M w/ETOH abuse, numerous admissions for alcohol withdrawal admitted for ROSA with hospital course complicated by alcohol withdrawal with DTs and MSSA PNA.  Pt initially admitted to ICU from floors after RRT for severe agitation 2/2 ETOH withdrawal and DTs for precedex gtt.  Received ATC phenobarb with precedex gtt.   Course c/b fevers, sputum culture growing MSSA s/p course of unasyn.   Pt transitioned from phenobarb IVP to seroquen and librium, tolerating.  Awake, alert, cooperative at this time.  Weaned from precedex.  Stable for transfer to medicine. DW attending Naomie, signed out to attending Marie.

## 2021-03-15 NOTE — PROGRESS NOTE ADULT - ASSESSMENT
56 y/o M w/ETOH abuse, numerous admissions for alcohol withdrawal admitted for ROAS with hospital course complicated by alcohol withdrawal with DTs and MSSA PNA.     - Librium, phenobarb  - Wean precedex as tolerated  - Complete course of abx  - MVI/thiamine/folate    I have personally provided 35 minutes of attending critical care time excluding procedures. 54 y/o M w/ETOH abuse, numerous admissions for alcohol withdrawal admitted for ROSA with hospital course complicated by alcohol withdrawal with DTs and MSSA PNA.     - Librium, ativan PRN  - Wean precedex as tolerated  - Complete course of abx  - MVI/thiamine/folate    I have personally provided 35 minutes of attending critical care time excluding procedures.

## 2021-03-16 LAB
ANION GAP SERPL CALC-SCNC: 5 MMOL/L — SIGNIFICANT CHANGE UP (ref 5–17)
BUN SERPL-MCNC: 15 MG/DL — SIGNIFICANT CHANGE UP (ref 7–23)
CALCIUM SERPL-MCNC: 9.1 MG/DL — SIGNIFICANT CHANGE UP (ref 8.5–10.1)
CHLORIDE SERPL-SCNC: 106 MMOL/L — SIGNIFICANT CHANGE UP (ref 96–108)
CO2 SERPL-SCNC: 28 MMOL/L — SIGNIFICANT CHANGE UP (ref 22–31)
CREAT SERPL-MCNC: 1.05 MG/DL — SIGNIFICANT CHANGE UP (ref 0.5–1.3)
GLUCOSE SERPL-MCNC: 81 MG/DL — SIGNIFICANT CHANGE UP (ref 70–99)
HCT VFR BLD CALC: 35.2 % — LOW (ref 39–50)
HGB BLD-MCNC: 11.1 G/DL — LOW (ref 13–17)
MAGNESIUM SERPL-MCNC: 2.4 MG/DL — SIGNIFICANT CHANGE UP (ref 1.6–2.6)
MCHC RBC-ENTMCNC: 24.9 PG — LOW (ref 27–34)
MCHC RBC-ENTMCNC: 31.5 GM/DL — LOW (ref 32–36)
MCV RBC AUTO: 78.9 FL — LOW (ref 80–100)
NRBC # BLD: 0 /100 WBCS — SIGNIFICANT CHANGE UP (ref 0–0)
PHOSPHATE SERPL-MCNC: 3.1 MG/DL — SIGNIFICANT CHANGE UP (ref 2.5–4.5)
PLATELET # BLD AUTO: 523 K/UL — HIGH (ref 150–400)
POTASSIUM SERPL-MCNC: 3.8 MMOL/L — SIGNIFICANT CHANGE UP (ref 3.5–5.3)
POTASSIUM SERPL-SCNC: 3.8 MMOL/L — SIGNIFICANT CHANGE UP (ref 3.5–5.3)
RBC # BLD: 4.46 M/UL — SIGNIFICANT CHANGE UP (ref 4.2–5.8)
RBC # FLD: 16.8 % — HIGH (ref 10.3–14.5)
SODIUM SERPL-SCNC: 139 MMOL/L — SIGNIFICANT CHANGE UP (ref 135–145)
WBC # BLD: 4.79 K/UL — SIGNIFICANT CHANGE UP (ref 3.8–10.5)
WBC # FLD AUTO: 4.79 K/UL — SIGNIFICANT CHANGE UP (ref 3.8–10.5)

## 2021-03-16 PROCEDURE — 99233 SBSQ HOSP IP/OBS HIGH 50: CPT

## 2021-03-16 RX ORDER — ACETAMINOPHEN 500 MG
650 TABLET ORAL EVERY 6 HOURS
Refills: 0 | Status: DISCONTINUED | OUTPATIENT
Start: 2021-03-16 | End: 2021-03-24

## 2021-03-16 RX ADMIN — Medication 25 MILLIGRAM(S): at 17:04

## 2021-03-16 RX ADMIN — QUETIAPINE FUMARATE 25 MILLIGRAM(S): 200 TABLET, FILM COATED ORAL at 21:02

## 2021-03-16 RX ADMIN — Medication 25 MILLIGRAM(S): at 06:23

## 2021-03-16 RX ADMIN — Medication 50 MILLIGRAM(S): at 21:01

## 2021-03-16 RX ADMIN — Medication 2 MILLIGRAM(S): at 01:25

## 2021-03-16 RX ADMIN — Medication 1 MILLIGRAM(S): at 12:06

## 2021-03-16 RX ADMIN — Medication 75 MILLIGRAM(S): at 14:48

## 2021-03-16 RX ADMIN — Medication 75 MILLIGRAM(S): at 06:23

## 2021-03-16 RX ADMIN — ENOXAPARIN SODIUM 40 MILLIGRAM(S): 100 INJECTION SUBCUTANEOUS at 11:27

## 2021-03-16 RX ADMIN — Medication 2 MILLIGRAM(S): at 23:51

## 2021-03-16 RX ADMIN — Medication 650 MILLIGRAM(S): at 06:23

## 2021-03-16 RX ADMIN — Medication 1 TABLET(S): at 11:27

## 2021-03-16 RX ADMIN — Medication 650 MILLIGRAM(S): at 07:15

## 2021-03-16 RX ADMIN — ATORVASTATIN CALCIUM 20 MILLIGRAM(S): 80 TABLET, FILM COATED ORAL at 21:01

## 2021-03-16 RX ADMIN — Medication 100 MILLIGRAM(S): at 11:27

## 2021-03-16 RX ADMIN — Medication 200 MILLIGRAM(S): at 01:25

## 2021-03-16 NOTE — PROGRESS NOTE ADULT - SUBJECTIVE AND OBJECTIVE BOX
Patient is a 55y old  Male who presents with a chief complaint of ETOH withdrawal and ROSA from dehydration. (15 Mar 2021 09:02)      INTERVAL HPI/OVERNIGHT EVENTS:    MEDICATIONS  (STANDING):  atorvastatin 20 milliGRAM(s) Oral at bedtime  chlordiazePOXIDE 75 milliGRAM(s) Oral every 8 hours  enoxaparin Injectable 40 milliGRAM(s) SubCutaneous daily  folic acid Injectable 1 milliGRAM(s) IV Push daily  metoprolol tartrate 25 milliGRAM(s) Oral two times a day  multivitamin 1 Tablet(s) Oral daily  QUEtiapine 25 milliGRAM(s) Oral at bedtime  thiamine Injectable 100 milliGRAM(s) IV Push daily    MEDICATIONS  (PRN):  acetaminophen   Tablet .. 650 milliGRAM(s) Oral every 6 hours PRN Mild Pain (1 - 3)  guaiFENesin   Syrup  (Sugar-Free) 200 milliGRAM(s) Oral every 6 hours PRN Cough  LORazepam   Injectable 2 milliGRAM(s) IV Push every 6 hours PRN Agitation  ondansetron Injectable 4 milliGRAM(s) IV Push every 6 hours PRN Nausea and/or Vomiting      Allergies    No Known Allergies    Intolerances        Vital Signs Last 24 Hrs  T(C): 36.7 (16 Mar 2021 05:53), Max: 37.3 (15 Mar 2021 19:00)  T(F): 98 (16 Mar 2021 05:53), Max: 99.1 (15 Mar 2021 19:00)  HR: 80 (16 Mar 2021 05:53) (61 - 97)  BP: 126/68 (16 Mar 2021 05:53) (101/60 - 157/85)  BP(mean): 96 (15 Mar 2021 23:00) (60 - 103)  RR: 18 (16 Mar 2021 05:53) (11 - 21)  SpO2: 94% (16 Mar 2021 05:53) (94% - 100%)    PHYSICAL EXAM:  GENERAL: NAD, well-groomed, well-developed  HEAD:  Atraumatic, Normocephalic  EYES: EOMI, PERRLA, conjunctiva and sclera clear  ENMT: No tonsillar erythema, exudates, or enlargement; Moist mucous membranes, Good dentition, No lesions  NECK: Supple, No JVD, Normal thyroid  NERVOUS SYSTEM:  Alert & Oriented X3, Good concentration; Motor Strength 5/5 B/L upper and lower extremities; DTRs 2+ intact and symmetric  CHEST/LUNG: Clear to auscultation bilaterally; No rales, rhonchi, wheezing, or rubs  HEART: Regular rate and rhythm; No murmurs, rubs, or gallops  ABDOMEN: Soft, Nontender, Nondistended; Bowel sounds present  EXTREMITIES:  2+ Peripheral Pulses, No clubbing, cyanosis, or edema  LYMPH: No lymphadenopathy noted  SKIN: No rashes or lesions    LABS:                        10.2   5.43  )-----------( 380      ( 15 Mar 2021 04:59 )             32.6     03-15    139  |  106  |  11  ----------------------------<  81  4.0   |  25  |  1.00    Ca    8.7      15 Mar 2021 04:59  Phos  3.4     03-15  Mg     2.3     03-15    TPro  7.4  /  Alb  2.8<L>  /  TBili  0.1<L>  /  DBili  x   /  AST  23  /  ALT  35  /  AlkPhos  41  03-15        RADIOLOGY & ADDITIONAL TESTS:    03-15-21 @ 07:01  -  03-16-21 @ 07:00  --------------------------------------------------------  IN:    Dexmedetomidine: 65.7 mL    IV PiggyBack: 100 mL    Oral Fluid: 1080 mL  Total IN: 1245.7 mL    OUT:    Incontinent per Condom Catheter (mL): 600 mL    Voided (mL): 300 mL  Total OUT: 900 mL    Total NET: 345.7 mL       56 yo M with a history of chronic ETOH abuse w/ DTs with frequent hospital admissions, HLD, alcoholic gastritis/esophagitis/GERD/PUD & hx of hypoxic respiratory failure requiring intubation due to aspiration PNA, presented w/ nausea and vomiting and was admitted for ROSA & lactic acidosis due to dehydration. His hospital course was complicated by MSSA PNA & alcohol withdrawal with DTs requiring admission to the ICU on a Precedex gtt and phenobarb on 3/5. Pt improved and was transferred to medical floor on 3/15. He is lying in bed in NAD.     MEDICATIONS  (STANDING):  atorvastatin 20 milliGRAM(s) Oral at bedtime  chlordiazePOXIDE 75 milliGRAM(s) Oral every 8 hours  enoxaparin Injectable 40 milliGRAM(s) SubCutaneous daily  folic acid Injectable 1 milliGRAM(s) IV Push daily  metoprolol tartrate 25 milliGRAM(s) Oral two times a day  multivitamin 1 Tablet(s) Oral daily  QUEtiapine 25 milliGRAM(s) Oral at bedtime  thiamine Injectable 100 milliGRAM(s) IV Push daily    MEDICATIONS  (PRN):  acetaminophen   Tablet .. 650 milliGRAM(s) Oral every 6 hours PRN Mild Pain (1 - 3)  guaiFENesin   Syrup  (Sugar-Free) 200 milliGRAM(s) Oral every 6 hours PRN Cough  LORazepam   Injectable 2 milliGRAM(s) IV Push every 6 hours PRN Agitation  ondansetron Injectable 4 milliGRAM(s) IV Push every 6 hours PRN Nausea and/or Vomiting      Allergies    No Known Allergies    Intolerances        Vital Signs Last 24 Hrs  T(C): 36.7 (16 Mar 2021 05:53), Max: 37.3 (15 Mar 2021 19:00)  T(F): 98 (16 Mar 2021 05:53), Max: 99.1 (15 Mar 2021 19:00)  HR: 80 (16 Mar 2021 05:53) (61 - 97)  BP: 126/68 (16 Mar 2021 05:53) (101/60 - 157/85)  BP(mean): 96 (15 Mar 2021 23:00) (60 - 103)  RR: 18 (16 Mar 2021 05:53) (11 - 21)  SpO2: 94% (16 Mar 2021 05:53) (94% - 100%)    PHYSICAL EXAM:  GENERAL: NAD, well-groomed, well-developed  HEAD:  Atraumatic, Normocephalic  EYES: EOMI, PERRLA,   NECK: Supple   NERVOUS SYSTEM:  Alert & Oriented to self and time, but still confused otherwise  CHEST/LUNG: Clear to auscultation bilaterally; No rales, rhonchi, wheezing, or rubs  HEART: Regular rate and rhythm; No murmurs, rubs, or gallops  ABDOMEN: Soft, Nontender, Nondistended; Bowel sounds present  EXTREMITIES: No clubbing, cyanosis, or edema     LABS:                        10.2   5.43  )-----------( 380      ( 15 Mar 2021 04:59 )             32.6     03-15    139  |  106  |  11  ----------------------------<  81  4.0   |  25  |  1.00    Ca    8.7      15 Mar 2021 04:59  Phos  3.4     03-15  Mg     2.3     03-15    TPro  7.4  /  Alb  2.8<L>  /  TBili  0.1<L>  /  DBili  x   /  AST  23  /  ALT  35  /  AlkPhos  41  03-15        RADIOLOGY & ADDITIONAL TESTS:    03-15-21 @ 07:01  -  03-16-21 @ 07:00  --------------------------------------------------------  IN:    Dexmedetomidine: 65.7 mL    IV PiggyBack: 100 mL    Oral Fluid: 1080 mL  Total IN: 1245.7 mL    OUT:    Incontinent per Condom Catheter (mL): 600 mL    Voided (mL): 300 mL  Total OUT: 900 mL    Total NET: 345.7 mL

## 2021-03-17 LAB
ANION GAP SERPL CALC-SCNC: 8 MMOL/L — SIGNIFICANT CHANGE UP (ref 5–17)
BASE EXCESS BLDA CALC-SCNC: -1 MMOL/L — SIGNIFICANT CHANGE UP (ref -2–2)
BLOOD GAS COMMENTS: SIGNIFICANT CHANGE UP
BLOOD GAS COMMENTS: SIGNIFICANT CHANGE UP
BLOOD GAS SOURCE: SIGNIFICANT CHANGE UP
BUN SERPL-MCNC: 16 MG/DL — SIGNIFICANT CHANGE UP (ref 7–23)
CALCIUM SERPL-MCNC: 9.6 MG/DL — SIGNIFICANT CHANGE UP (ref 8.5–10.1)
CHLORIDE SERPL-SCNC: 104 MMOL/L — SIGNIFICANT CHANGE UP (ref 96–108)
CO2 SERPL-SCNC: 22 MMOL/L — SIGNIFICANT CHANGE UP (ref 22–31)
CREAT SERPL-MCNC: 1.13 MG/DL — SIGNIFICANT CHANGE UP (ref 0.5–1.3)
GLUCOSE SERPL-MCNC: 87 MG/DL — SIGNIFICANT CHANGE UP (ref 70–99)
HCO3 BLDA-SCNC: 28 MMOL/L — SIGNIFICANT CHANGE UP (ref 21–29)
HCT VFR BLD CALC: 36.5 % — LOW (ref 39–50)
HCT VFR BLD CALC: 41.5 % — SIGNIFICANT CHANGE UP (ref 39–50)
HGB BLD-MCNC: 11.5 G/DL — LOW (ref 13–17)
HGB BLD-MCNC: 12.2 G/DL — LOW (ref 13–17)
HOROWITZ INDEX BLDA+IHG-RTO: 100 — SIGNIFICANT CHANGE UP
MAGNESIUM SERPL-MCNC: 2.6 MG/DL — SIGNIFICANT CHANGE UP (ref 1.6–2.6)
MCHC RBC-ENTMCNC: 24.5 PG — LOW (ref 27–34)
MCHC RBC-ENTMCNC: 25 PG — LOW (ref 27–34)
MCHC RBC-ENTMCNC: 29.4 GM/DL — LOW (ref 32–36)
MCHC RBC-ENTMCNC: 31.5 GM/DL — LOW (ref 32–36)
MCV RBC AUTO: 79.3 FL — LOW (ref 80–100)
MCV RBC AUTO: 83.3 FL — SIGNIFICANT CHANGE UP (ref 80–100)
NRBC # BLD: 0 /100 WBCS — SIGNIFICANT CHANGE UP (ref 0–0)
NRBC # BLD: 0 /100 WBCS — SIGNIFICANT CHANGE UP (ref 0–0)
PCO2 BLDA: 43 MMHG — SIGNIFICANT CHANGE UP (ref 32–46)
PH BLD: 7.36 — SIGNIFICANT CHANGE UP (ref 7.35–7.45)
PHOSPHATE SERPL-MCNC: 4.1 MG/DL — SIGNIFICANT CHANGE UP (ref 2.5–4.5)
PLATELET # BLD AUTO: 671 K/UL — HIGH (ref 150–400)
PLATELET # BLD AUTO: SIGNIFICANT CHANGE UP K/UL (ref 150–400)
PO2 BLDA: 140 MMHG — HIGH (ref 74–108)
POTASSIUM SERPL-MCNC: 4.4 MMOL/L — SIGNIFICANT CHANGE UP (ref 3.5–5.3)
POTASSIUM SERPL-SCNC: 4.4 MMOL/L — SIGNIFICANT CHANGE UP (ref 3.5–5.3)
RBC # BLD: 4.6 M/UL — SIGNIFICANT CHANGE UP (ref 4.2–5.8)
RBC # BLD: 4.98 M/UL — SIGNIFICANT CHANGE UP (ref 4.2–5.8)
RBC # FLD: 17.2 % — HIGH (ref 10.3–14.5)
RBC # FLD: 17.2 % — HIGH (ref 10.3–14.5)
SAO2 % BLDA: 99 % — HIGH (ref 92–96)
SODIUM SERPL-SCNC: 134 MMOL/L — LOW (ref 135–145)
WBC # BLD: 10.29 K/UL — SIGNIFICANT CHANGE UP (ref 3.8–10.5)
WBC # BLD: 8.86 K/UL — SIGNIFICANT CHANGE UP (ref 3.8–10.5)
WBC # FLD AUTO: 10.29 K/UL — SIGNIFICANT CHANGE UP (ref 3.8–10.5)
WBC # FLD AUTO: 8.86 K/UL — SIGNIFICANT CHANGE UP (ref 3.8–10.5)

## 2021-03-17 PROCEDURE — 99233 SBSQ HOSP IP/OBS HIGH 50: CPT

## 2021-03-17 PROCEDURE — 71045 X-RAY EXAM CHEST 1 VIEW: CPT | Mod: 26

## 2021-03-17 RX ORDER — AMPICILLIN SODIUM AND SULBACTAM SODIUM 250; 125 MG/ML; MG/ML
3 INJECTION, POWDER, FOR SUSPENSION INTRAMUSCULAR; INTRAVENOUS ONCE
Refills: 0 | Status: COMPLETED | OUTPATIENT
Start: 2021-03-17 | End: 2021-03-17

## 2021-03-17 RX ORDER — AMPICILLIN SODIUM AND SULBACTAM SODIUM 250; 125 MG/ML; MG/ML
3 INJECTION, POWDER, FOR SUSPENSION INTRAMUSCULAR; INTRAVENOUS EVERY 6 HOURS
Refills: 0 | Status: DISCONTINUED | OUTPATIENT
Start: 2021-03-18 | End: 2021-03-23

## 2021-03-17 RX ORDER — SODIUM CHLORIDE 9 MG/ML
1000 INJECTION INTRAMUSCULAR; INTRAVENOUS; SUBCUTANEOUS
Refills: 0 | Status: DISCONTINUED | OUTPATIENT
Start: 2021-03-17 | End: 2021-03-23

## 2021-03-17 RX ORDER — AMPICILLIN SODIUM AND SULBACTAM SODIUM 250; 125 MG/ML; MG/ML
INJECTION, POWDER, FOR SUSPENSION INTRAMUSCULAR; INTRAVENOUS
Refills: 0 | Status: DISCONTINUED | OUTPATIENT
Start: 2021-03-17 | End: 2021-03-23

## 2021-03-17 RX ADMIN — ATORVASTATIN CALCIUM 20 MILLIGRAM(S): 80 TABLET, FILM COATED ORAL at 21:27

## 2021-03-17 RX ADMIN — Medication 2 MILLIGRAM(S): at 23:42

## 2021-03-17 RX ADMIN — SODIUM CHLORIDE 75 MILLILITER(S): 9 INJECTION INTRAMUSCULAR; INTRAVENOUS; SUBCUTANEOUS at 18:44

## 2021-03-17 RX ADMIN — Medication 25 MILLIGRAM(S): at 05:47

## 2021-03-17 RX ADMIN — AMPICILLIN SODIUM AND SULBACTAM SODIUM 200 GRAM(S): 250; 125 INJECTION, POWDER, FOR SUSPENSION INTRAMUSCULAR; INTRAVENOUS at 19:57

## 2021-03-17 RX ADMIN — Medication 50 MILLIGRAM(S): at 13:03

## 2021-03-17 RX ADMIN — Medication 1 MILLIGRAM(S): at 13:02

## 2021-03-17 RX ADMIN — Medication 1 TABLET(S): at 11:27

## 2021-03-17 RX ADMIN — Medication 100 MILLIGRAM(S): at 11:27

## 2021-03-17 RX ADMIN — ENOXAPARIN SODIUM 40 MILLIGRAM(S): 100 INJECTION SUBCUTANEOUS at 11:27

## 2021-03-17 RX ADMIN — QUETIAPINE FUMARATE 25 MILLIGRAM(S): 200 TABLET, FILM COATED ORAL at 21:27

## 2021-03-17 RX ADMIN — Medication 50 MILLIGRAM(S): at 05:47

## 2021-03-17 NOTE — CONSULT NOTE ADULT - ATTENDING COMMENTS
54 yo M with a long hx chronic ETOH abuse w/ DTs with frequent hospital admissions as above admitted w/ hypoxic respiratory failure requiring intubation due to aspiration PNA & then DTs. Now ICU consulted after he has poss aspiration event on floors. Pt currently awake and following commands. All vital signs stable, RR 22 and no accessory muscle use. Sat 99% on O2. Has new PNA on LLL on chest xray and would recommend start abx. Keep pt NPO. Would carefully monitor benzo for etoh withdrawal to avoid over sedation. Does not need ICU at this time

## 2021-03-17 NOTE — PROGRESS NOTE ADULT - SUBJECTIVE AND OBJECTIVE BOX
54 yo M with a history of chronic ETOH abuse w/ DTs with frequent hospital admissions, HLD, alcoholic gastritis/esophagitis/GERD/PUD & hx of hypoxic respiratory failure requiring intubation due to aspiration PNA, presented w/ nausea and vomiting and was admitted for ROSA & lactic acidosis due to dehydration. His hospital course was complicated by MSSA PNA & alcohol withdrawal with DTs requiring admission to the ICU on a Precedex gtt and phenobarb on 3/5. Pt improved and was transferred to medical floor on 3/15. He is lying in bed in NAD.       MEDICATIONS  (STANDING):  atorvastatin 20 milliGRAM(s) Oral at bedtime  chlordiazePOXIDE 50 milliGRAM(s) Oral two times a day  enoxaparin Injectable 40 milliGRAM(s) SubCutaneous daily  folic acid Injectable 1 milliGRAM(s) IV Push daily  metoprolol tartrate 25 milliGRAM(s) Oral two times a day  multivitamin 1 Tablet(s) Oral daily  QUEtiapine 25 milliGRAM(s) Oral at bedtime  thiamine Injectable 100 milliGRAM(s) IV Push daily    MEDICATIONS  (PRN):  acetaminophen   Tablet .. 650 milliGRAM(s) Oral every 6 hours PRN Mild Pain (1 - 3)  guaiFENesin   Syrup  (Sugar-Free) 200 milliGRAM(s) Oral every 6 hours PRN Cough  LORazepam   Injectable 2 milliGRAM(s) IV Push every 6 hours PRN Agitation  ondansetron Injectable 4 milliGRAM(s) IV Push every 6 hours PRN Nausea and/or Vomiting      Allergies    No Known Allergies    Intolerances        Vital Signs Last 24 Hrs  T(C): 36.9 (17 Mar 2021 17:20), Max: 36.9 (17 Mar 2021 11:20)  T(F): 98.4 (17 Mar 2021 17:20), Max: 98.4 (17 Mar 2021 11:20)  HR: 100 (17 Mar 2021 17:20) (85 - 100)  BP: 137/89 (17 Mar 2021 17:20) (126/87 - 137/89)  BP(mean): --  RR: 22 (17 Mar 2021 17:20) (18 - 28)  SpO2: 98% (17 Mar 2021 17:20) (92% - 98%)    PHYSICAL EXAM:  GENERAL: NAD, well-groomed, well-developed  HEAD:  Atraumatic, Normocephalic  EYES: EOMI, PERRLA,   NECK: Supple   NERVOUS SYSTEM:  Alert & Oriented to self and time, but still confused otherwise  CHEST/LUNG: Clear to auscultation bilaterally; No rales, rhonchi, wheezing, or rubs  HEART: Regular rate and rhythm; No murmurs, rubs, or gallops  ABDOMEN: Soft, Nontender, Nondistended; Bowel sounds present  EXTREMITIES: No clubbing, cyanosis, or edema    LABS:                        11.5   10.29 )-----------( 671      ( 17 Mar 2021 10:32 )             36.5     03-17    134<L>  |  104  |  16  ----------------------------<  87  4.4   |  22  |  1.13    Ca    9.6      17 Mar 2021 09:00  Phos  4.1     03-17  Mg     2.6     03-17          CAPILLARY BLOOD GLUCOSE          RADIOLOGY & ADDITIONAL TESTS:    03-16-21 @ 07:01  -  03-17-21 @ 07:00  --------------------------------------------------------  IN:    Oral Fluid: 240 mL  Total IN: 240 mL    OUT:  Total OUT: 0 mL    Total NET: 240 mL      03-17-21 @ 07:01  -  03-17-21 @ 18:02  --------------------------------------------------------  IN:  Total IN: 0 mL    OUT:    Voided (mL): 500 mL  Total OUT: 500 mL    Total NET: -500 mL

## 2021-03-17 NOTE — CONSULT NOTE ADULT - SUBJECTIVE AND OBJECTIVE BOX
56 yo M with a history of chronic ETOH abuse w/ DTs with frequent hospital admissions, HLD, alcoholic gastritis/esophagitis/GERD/PUD & hx of hypoxic respiratory failure requiring intubation due to aspiration PNA, presented w/ nausea and vomiting and was admitted for ROSA & lactic acidosis due to dehydration 3/3. His hospital course was complicated by MSSA PNA & alcohol withdrawal with DTs requiring admission to the ICU on a Precedex gtt and phenobarb on 3/5. Pt improved and was transferred to medical floor on 3/15. Pt has been managed with librium taper, Seroquel, thiamine, folate and MVN. Pt is s/p unasyn for MSSA PNA in sputum. ICU consulted for aspiration.    Vital Signs Last 24 Hrs  T(C): 36.9 (17 Mar 2021 11:20), Max: 36.9 (17 Mar 2021 11:20)  T(F): 98.4 (17 Mar 2021 11:20), Max: 98.4 (17 Mar 2021 11:20)  HR: 100 (17 Mar 2021 16:34) (85 - 100)  BP: 131/79 (17 Mar 2021 11:20) (126/87 - 149/76)  BP(mean): --  RR: 28 (17 Mar 2021 16:34) (18 - 28)  SpO2: 95% (17 Mar 2021 16:34) (92% - 97%)    PHYSICAL EXAM:  GENERAL: very lethargic  HEAD:  NC/AC  EYES: EOMI, PERRLA, conjunctiva and sclera clear  NECK: Supple, No JVD  CHEST/LUNG: CTAB. No cough, wheeze, rales.   HEART: Regular rate and rhythm. No murmurs, rubs, or gallops.   ABDOMEN: Soft, Nontender, Nondistended. Bowel sounds present  EXTREMITIES:  2+ Peripheral Pulses, No clubbing, cyanosis, or edema  MS: AAOxo  NEUROLOGY: mild gag, mildly withdraws from painful stimuli  SKIN: No rashes or lesions

## 2021-03-17 NOTE — CONSULT NOTE ADULT - ASSESSMENT
56 yo M with a history of chronic ETOH abuse w/ DTs with frequent hospital admissions, HLD, alcoholic gastritis/esophagitis/GERD/PUD & hx of hypoxic respiratory failure requiring intubation due to aspiration PNA, presented w/ nausea and vomiting and was admitted for ROSA & lactic acidosis due to dehydration 3/3. His hospital course was complicated by MSSA PNA & alcohol withdrawal with DTs requiring admission to the ICU on a Precedex gtt and phenobarb on 3/5. Pt improved and was transferred to medical floor on 3/15. ICU consulted for aspiration.    - Patient on NRB mask with stable vitals at this time O2 sat 95-98%  - Recommend initiating antibiotics for possible aspiration PNA  - Decrease sedating agents as patient is very lethargic  - NPO    Pt does not require ICU level of care at this time. Should patients mental status worsen or should patient decompensate further, please reconsult ICU    Seen and d.josé miguel Akbar

## 2021-03-17 NOTE — PROGRESS NOTE ADULT - ASSESSMENT
54 yo M with a history of chronic ETOH abuse w/ DTs with frequent hospital admissions, HLD, alcoholic gastritis/esophagitis/GERD/PUD & hx of hypoxic respiratory failure requiring intubation due to aspiration PNA, presented w/ nausea and vomiting and was admitted for ROSA & lactic acidosis due to dehydration. His hospital course was complicated by MSSA PNA & alcohol withdrawal with DTs requiring admission to the ICU on a Precedex gtt and phenobarb on 3/5. Pt improved and was transferred to medical floor on 3/15.     Alcohol withdrawal  - patient is off Precedex and phenobarb  - c/w librium taper  - c/w Seroquel  - c/w thiamin, folate and MVN    MSSA PNA  - s/p course of Unasyn  - CXR was negative but sputum grew MSSA    HTN  - c/w Metoprolol    HLD  - c/w Lipitor    Prophylaxis:  DVT: Lovenox  GI: PO diet    i called his son, Lavelle, but he did not  54 yo M with a history of chronic ETOH abuse w/ DTs with frequent hospital admissions, HLD, alcoholic gastritis/esophagitis/GERD/PUD & hx of hypoxic respiratory failure requiring intubation due to aspiration PNA, presented w/ nausea and vomiting and was admitted for ROSA & lactic acidosis due to dehydration. His hospital course was complicated by MSSA PNA & alcohol withdrawal with DTs requiring admission to the ICU on a Precedex gtt and phenobarb on 3/5. Pt improved and was transferred to medical floor on 3/15.     Aspiration PNA on 3/17 w/ acute hypoxic respiratory failure   - CXR showed new left lower lung field infiltrate  - make patient NPO & suction  - start patient on oxygen and check ABG  - start Unasyn  - critical care note read and appreciated     Alcohol withdrawal  - patient is off Precedex and phenobarb  - c/w librium taper - psych adjusted taper  - c/w Seroquel  - c/w thiamin, folate and MVN    MSSA PNA  - s/p course of Unasyn  - CXR was negative but sputum grew MSSA    HTN  - c/w Metoprolol    HLD  - c/w Lipitor    Prophylaxis:  DVT: Lovenox  GI: PO diet    I called his son, Lavelle, but he did not  56 yo M with a history of chronic ETOH abuse w/ DTs with frequent hospital admissions, HLD, alcoholic gastritis/esophagitis/GERD/PUD & hx of hypoxic respiratory failure requiring intubation due to aspiration PNA, presented w/ nausea and vomiting and was admitted for ROSA & lactic acidosis due to dehydration. His hospital course was complicated by MSSA PNA & alcohol withdrawal with DTs requiring admission to the ICU on a Precedex gtt and phenobarb on 3/5. Pt improved and was transferred to medical floor on 3/15.     Aspiration PNA on 3/17 w/ acute hypoxic respiratory failure   - CXR showed new left lower lung field infiltrate  - make patient NPO & suction  - start patient on oxygen and check ABG  - start Unasyn  - critical care note read and appreciated     Alcohol withdrawal  - patient is off Precedex and phenobarb  - c/w librium taper - psych adjusted taper  - c/w Seroquel  - c/w thiamin, folate and MVN    MSSA PNA  - s/p course of Unasyn  - CXR was negative but sputum grew MSSA    HTN  - c/w Metoprolol    HLD  - c/w Lipitor    Prophylaxis:  DVT: Lovenox  GI: PO diet    I called & updated his son, Lavelle, [phone].

## 2021-03-18 DIAGNOSIS — F05 DELIRIUM DUE TO KNOWN PHYSIOLOGICAL CONDITION: ICD-10-CM

## 2021-03-18 DIAGNOSIS — F10.20 ALCOHOL DEPENDENCE, UNCOMPLICATED: ICD-10-CM

## 2021-03-18 LAB
ANION GAP SERPL CALC-SCNC: 8 MMOL/L — SIGNIFICANT CHANGE UP (ref 5–17)
BUN SERPL-MCNC: 17 MG/DL — SIGNIFICANT CHANGE UP (ref 7–23)
CALCIUM SERPL-MCNC: 9.2 MG/DL — SIGNIFICANT CHANGE UP (ref 8.5–10.1)
CHLORIDE SERPL-SCNC: 109 MMOL/L — HIGH (ref 96–108)
CO2 SERPL-SCNC: 23 MMOL/L — SIGNIFICANT CHANGE UP (ref 22–31)
CREAT SERPL-MCNC: 1.14 MG/DL — SIGNIFICANT CHANGE UP (ref 0.5–1.3)
GLUCOSE SERPL-MCNC: 91 MG/DL — SIGNIFICANT CHANGE UP (ref 70–99)
HCT VFR BLD CALC: 35.8 % — LOW (ref 39–50)
HGB BLD-MCNC: 10.8 G/DL — LOW (ref 13–17)
MAGNESIUM SERPL-MCNC: 2.3 MG/DL — SIGNIFICANT CHANGE UP (ref 1.6–2.6)
MCHC RBC-ENTMCNC: 25.5 PG — LOW (ref 27–34)
MCHC RBC-ENTMCNC: 30.2 GM/DL — LOW (ref 32–36)
MCV RBC AUTO: 84.4 FL — SIGNIFICANT CHANGE UP (ref 80–100)
NRBC # BLD: 0 /100 WBCS — SIGNIFICANT CHANGE UP (ref 0–0)
PHOSPHATE SERPL-MCNC: 2.9 MG/DL — SIGNIFICANT CHANGE UP (ref 2.5–4.5)
PLATELET # BLD AUTO: 603 K/UL — HIGH (ref 150–400)
POTASSIUM SERPL-MCNC: 4.1 MMOL/L — SIGNIFICANT CHANGE UP (ref 3.5–5.3)
POTASSIUM SERPL-SCNC: 4.1 MMOL/L — SIGNIFICANT CHANGE UP (ref 3.5–5.3)
RBC # BLD: 4.24 M/UL — SIGNIFICANT CHANGE UP (ref 4.2–5.8)
RBC # FLD: 17.4 % — HIGH (ref 10.3–14.5)
SODIUM SERPL-SCNC: 140 MMOL/L — SIGNIFICANT CHANGE UP (ref 135–145)
WBC # BLD: 15.85 K/UL — HIGH (ref 3.8–10.5)
WBC # FLD AUTO: 15.85 K/UL — HIGH (ref 3.8–10.5)

## 2021-03-18 PROCEDURE — 90792 PSYCH DIAG EVAL W/MED SRVCS: CPT

## 2021-03-18 PROCEDURE — 99233 SBSQ HOSP IP/OBS HIGH 50: CPT

## 2021-03-18 RX ORDER — HALOPERIDOL DECANOATE 100 MG/ML
3 INJECTION INTRAMUSCULAR ONCE
Refills: 0 | Status: COMPLETED | OUTPATIENT
Start: 2021-03-18 | End: 2021-03-18

## 2021-03-18 RX ORDER — METOPROLOL TARTRATE 50 MG
5 TABLET ORAL EVERY 12 HOURS
Refills: 0 | Status: DISCONTINUED | OUTPATIENT
Start: 2021-03-18 | End: 2021-03-23

## 2021-03-18 RX ADMIN — ENOXAPARIN SODIUM 40 MILLIGRAM(S): 100 INJECTION SUBCUTANEOUS at 11:05

## 2021-03-18 RX ADMIN — AMPICILLIN SODIUM AND SULBACTAM SODIUM 200 GRAM(S): 250; 125 INJECTION, POWDER, FOR SUSPENSION INTRAMUSCULAR; INTRAVENOUS at 05:56

## 2021-03-18 RX ADMIN — AMPICILLIN SODIUM AND SULBACTAM SODIUM 200 GRAM(S): 250; 125 INJECTION, POWDER, FOR SUSPENSION INTRAMUSCULAR; INTRAVENOUS at 11:05

## 2021-03-18 RX ADMIN — Medication 5 MILLIGRAM(S): at 18:48

## 2021-03-18 RX ADMIN — SODIUM CHLORIDE 75 MILLILITER(S): 9 INJECTION INTRAMUSCULAR; INTRAVENOUS; SUBCUTANEOUS at 08:39

## 2021-03-18 RX ADMIN — AMPICILLIN SODIUM AND SULBACTAM SODIUM 200 GRAM(S): 250; 125 INJECTION, POWDER, FOR SUSPENSION INTRAMUSCULAR; INTRAVENOUS at 17:42

## 2021-03-18 RX ADMIN — Medication 100 MILLIGRAM(S): at 11:06

## 2021-03-18 RX ADMIN — Medication 2 MILLIGRAM(S): at 09:18

## 2021-03-18 RX ADMIN — AMPICILLIN SODIUM AND SULBACTAM SODIUM 200 GRAM(S): 250; 125 INJECTION, POWDER, FOR SUSPENSION INTRAMUSCULAR; INTRAVENOUS at 23:50

## 2021-03-18 RX ADMIN — Medication 1 TABLET(S): at 11:07

## 2021-03-18 RX ADMIN — ATORVASTATIN CALCIUM 20 MILLIGRAM(S): 80 TABLET, FILM COATED ORAL at 21:54

## 2021-03-18 RX ADMIN — HALOPERIDOL DECANOATE 3 MILLIGRAM(S): 100 INJECTION INTRAMUSCULAR at 11:40

## 2021-03-18 RX ADMIN — QUETIAPINE FUMARATE 25 MILLIGRAM(S): 200 TABLET, FILM COATED ORAL at 21:54

## 2021-03-18 RX ADMIN — Medication 2 MILLIGRAM(S): at 21:53

## 2021-03-18 RX ADMIN — Medication 1 MILLIGRAM(S): at 12:28

## 2021-03-18 RX ADMIN — SODIUM CHLORIDE 75 MILLILITER(S): 9 INJECTION INTRAMUSCULAR; INTRAVENOUS; SUBCUTANEOUS at 05:56

## 2021-03-18 RX ADMIN — AMPICILLIN SODIUM AND SULBACTAM SODIUM 200 GRAM(S): 250; 125 INJECTION, POWDER, FOR SUSPENSION INTRAMUSCULAR; INTRAVENOUS at 00:28

## 2021-03-18 RX ADMIN — Medication 2 MILLIGRAM(S): at 15:38

## 2021-03-18 RX ADMIN — SODIUM CHLORIDE 75 MILLILITER(S): 9 INJECTION INTRAMUSCULAR; INTRAVENOUS; SUBCUTANEOUS at 21:53

## 2021-03-18 NOTE — PROGRESS NOTE ADULT - SUBJECTIVE AND OBJECTIVE BOX
56 yo M with a history of chronic ETOH abuse w/ DTs with frequent hospital admissions, HLD, alcoholic gastritis/esophagitis/GERD/PUD & hx of hypoxic respiratory failure requiring intubation due to aspiration PNA, presented w/ nausea and vomiting and was admitted for ROSA & lactic acidosis due to dehydration. His hospital course was complicated by MSSA PNA & alcohol withdrawal with DTs requiring admission to the ICU on a Precedex gtt and phenobarb on 3/5. Pt improved and was transferred to medical floor on 3/15. He is lying in bed in NAD.        MEDICATIONS  (STANDING):  ampicillin/sulbactam  IVPB 3 Gram(s) IV Intermittent every 6 hours  ampicillin/sulbactam  IVPB      atorvastatin 20 milliGRAM(s) Oral at bedtime  enoxaparin Injectable 40 milliGRAM(s) SubCutaneous daily  folic acid Injectable 1 milliGRAM(s) IV Push daily  metoprolol tartrate Injectable 5 milliGRAM(s) IV Push every 12 hours  multivitamin 1 Tablet(s) Oral daily  QUEtiapine 25 milliGRAM(s) Oral at bedtime  sodium chloride 0.9%. 1000 milliLiter(s) (75 mL/Hr) IV Continuous <Continuous>  thiamine Injectable 100 milliGRAM(s) IV Push daily    MEDICATIONS  (PRN):  acetaminophen   Tablet .. 650 milliGRAM(s) Oral every 6 hours PRN Mild Pain (1 - 3)  guaiFENesin   Syrup  (Sugar-Free) 200 milliGRAM(s) Oral every 6 hours PRN Cough  LORazepam   Injectable 2 milliGRAM(s) IV Push every 6 hours PRN Agitation  ondansetron Injectable 4 milliGRAM(s) IV Push every 6 hours PRN Nausea and/or Vomiting      Allergies    No Known Allergies    Intolerances        Vital Signs Last 24 Hrs  T(C): 37.6 (18 Mar 2021 17:12), Max: 37.6 (18 Mar 2021 17:12)  T(F): 99.6 (18 Mar 2021 17:12), Max: 99.6 (18 Mar 2021 17:12)  HR: 108 (18 Mar 2021 17:12) (105 - 121)  BP: 138/78 (18 Mar 2021 17:12) (111/65 - 138/78)  BP(mean): --  RR: 18 (18 Mar 2021 17:12) (18 - 19)  SpO2: 95% (18 Mar 2021 17:12) (95% - 100%)    PHYSICAL EXAM:  GENERAL: NAD, well-groomed, well-developed  HEAD:  Atraumatic, Normocephalic  EYES: EOMI, PERRLA,   NECK: Supple   NERVOUS SYSTEM:  Alert & Oriented to self and time, but still confused otherwise  CHEST/LUNG: Clear to auscultation bilaterally; No rales, rhonchi, wheezing, or rubs  HEART: Regular rate and rhythm; No murmurs, rubs, or gallops  ABDOMEN: Soft, Nontender, Nondistended; Bowel sounds present  EXTREMITIES: No clubbing, cyanosis, or edema    LABS:                        10.8   15.85 )-----------( 603      ( 18 Mar 2021 10:22 )             35.8     03-18    140  |  109<H>  |  17  ----------------------------<  91  4.1   |  23  |  1.14    Ca    9.2      18 Mar 2021 10:22  Phos  2.9     03-18  Mg     2.3     03-18       RADIOLOGY & ADDITIONAL TESTS:    03-17-21 @ 07:01  -  03-18-21 @ 07:00  --------------------------------------------------------  IN:    IV PiggyBack: 150 mL    sodium chloride 0.9%: 750 mL  Total IN: 900 mL    OUT:    Incontinent per Condom Catheter (mL): 3 mL    Voided (mL): 500 mL  Total OUT: 503 mL    Total NET: 397 mL      03-18-21 @ 07:01  -  03-18-21 @ 20:41  --------------------------------------------------------  IN:  Total IN: 0 mL    OUT:    Voided (mL): 2 mL  Total OUT: 2 mL    Total NET: -2 mL

## 2021-03-18 NOTE — BEHAVIORAL HEALTH ASSESSMENT NOTE - NSBHCONSULTMEDS_PSY_A_CORE FT
Patient has been at [facility] for 15 days; not in alcohol withdrawal anymore. Librium thus being tapered off with perimeter to :"hold" for sedation  can discontinue Librium today as he has not gotten it in > 36 hrs given level of sedation.

## 2021-03-18 NOTE — BEHAVIORAL HEALTH ASSESSMENT NOTE - NSBHCONSULTRECOMMENDOTHER_PSY_A_CORE FT
elevate head, respiratory PT, medical optimization, discontinue unnecessary medications and simplify regimen

## 2021-03-18 NOTE — BEHAVIORAL HEALTH ASSESSMENT NOTE - OTHER
does not verbalize unable to ascertain due to limitation of exam looks confused PMA does not verbally engage with Writer deferred at this time see HPI as per hx does not seem to be responding ot internal stimuli n/a

## 2021-03-18 NOTE — BEHAVIORAL HEALTH ASSESSMENT NOTE - SUMMARY
- acute hypersctive multifactorial delirium at this time secondary to aspiration pneumonia, residual medications in system et al   - no capacity to make his medical decisions

## 2021-03-18 NOTE — BEHAVIORAL HEALTH ASSESSMENT NOTE - RISK ASSESSMENT
Chronic risk factors: single, male gender, continuous/chronic heavy alcohol abuse with limited insight and lack of motivation to address it. Protective factors: young; no formal psych hx; no known hx of suicide attempts or self-injurious behavior; no hx of aggression/violence; stable domicile, engaged with his job; denies illicit drug use; denies access to guns; access to health services. No acute risk factors identified. Low Acute Suicide Risk

## 2021-03-18 NOTE — BEHAVIORAL HEALTH ASSESSMENT NOTE - SUICIDE PROTECTIVE FACTORS
Responsibility to family and others/Identifies reasons for living/Has future plans/Supportive social network of family or friends/Fear of death or the actual act of killing self/Cultural, spiritual and/or moral attitudes against suicide/Engaged in work or school/Yarsanism beliefs

## 2021-03-18 NOTE — BEHAVIORAL HEALTH ASSESSMENT NOTE - NSBHCHARTREVIEWVS_PSY_A_CORE FT
T(C): 37.4 (03-18-21 @ 05:06), Max: 37.4 (03-17-21 @ 22:28)  HR: 105 (03-18-21 @ 05:06) (100 - 121)  BP: 111/65 (03-18-21 @ 05:06) (111/65 - 137/89)  RR: 18 (03-18-21 @ 05:06) (18 - 28)  SpO2: 100% (03-18-21 @ 05:06) (95% - 100%)

## 2021-03-18 NOTE — BEHAVIORAL HEALTH ASSESSMENT NOTE - HPI (INCLUDE ILLNESS QUALITY, SEVERITY, DURATION, TIMING, CONTEXT, MODIFYING FACTORS, ASSOCIATED SIGNS AND SYMPTOMS)
PATIENT KNOWN TO WRITER FROM PRIOR ADMISSIONS: 56 yo [place]   male, lives with his son in a private home, works as a , with long history of continuous Alcohol Abuse spanning decades (at most reports 2-3 bottles of vodka 1-2/day), with associated numerous ED visits and hospital admissions (ie. epigastric pain, vomiting, nausea, hematemesis, aspiration pneumonia, esophageal ulcer, UGI bleed) admitted for ROSA with hospital course complicated by alcohol withdrawal with DTs and MSSA PNA. ICU admission and transferred to floors on 3/15/21. Seen on 3/17/21 when noted to have copious secretions which needed suctioning, productive frequent cough so STAT chest xray ordered, respiratory PT, elevated head, medications reviewed. Chest xray showed new left lower lung field infiltrate; now on Unasyn for aspiration pneumonia.     EXAM: confused, psychomotor agitation, squirming around the bed, legs over side rail. Semi-somnolent, mumbling, not able to engage in meaningful manner.     PHYSICAL INSPECTION: Patient does not manifest any alcohol withdrawal signs    ISTOP Reference #:[id number]   04/14/2020 04/15/2020 chlordiazepoxide 25 mg capsule  20 5 Hailey Garrett A, Medicaid [facility] #[id number] PATIENT KNOWN TO WRITER FROM PRIOR ADMISSIONS: 54 yo [place]   male, lives with his son in a private home, works as a , with long history of continuous Alcohol Abuse spanning decades (at most reports 2-3 bottles of vodka 1-2/day), with associated numerous ED visits and hospital admissions (ie. epigastric pain, vomiting, nausea, hematemesis, aspiration pneumonia, esophageal ulcer, UGI bleed) admitted for ROSA with hospital course complicated by alcohol withdrawal with DTs and MSSA PNA. ICU admission and transferred to floors on 3/15/21. Seen on 3/17/21 when noted to have copious secretions which needed suctioning, productive frequent cough so STAT chest xray ordered, respiratory PT, elevated head, medications reviewed. Chest xray showed new left lower lung field infiltrate; now on Unasyn for aspiration pneumonia.     EXAM: confused, psychomotor agitation, squirming around the bed, legs over side rail. Semi-somnolent, mumbling, not able to engage in meaningful manner. As per nursing staff, Pt at times distressed and has perceptual distortions. Patient not able ot be redirected, all environmental interventions failed so was ordered haldol 3mg IVP x 1 dose for severe agitation treatment with potential harm to self (trying to climb out bed and pull lines)     PHYSICAL INSPECTION: Patient does not manifest any alcohol withdrawal signs    ISTOP Reference #:[id number]   04/14/2020 04/15/2020 chlordiazepoxide 25 mg capsule  20 5 Hailey Garrett A, Medicaid [facility] #[id number]

## 2021-03-18 NOTE — PROGRESS NOTE ADULT - ASSESSMENT
54 yo M with a history of chronic ETOH abuse w/ DTs with frequent hospital admissions, HLD, alcoholic gastritis/esophagitis/GERD/PUD & hx of hypoxic respiratory failure requiring intubation due to aspiration PNA, presented w/ nausea and vomiting and was admitted for ROSA & lactic acidosis due to dehydration. His hospital course was complicated by MSSA PNA & alcohol withdrawal with DTs requiring admission to the ICU on a Precedex gtt and phenobarb on 3/5. Pt improved and was transferred to medical floor on 3/15.     Aspiration PNA on 3/17 w/ acute hypoxic respiratory failure   - CXR showed new left lower lung field infiltrate  - make patient NPO & suction  - taper off oxygen   - c/w Unasyn  - critical care note read and appreciated     Alcohol withdrawal  - patient is off Precedex and phenobarb  - c/w librium taper - psych adjusted taper  - c/w Seroquel  - c/w thiamin, folate and MVN    MSSA PNA  - s/p course of Unasyn  - CXR was negative but sputum grew MSSA    HTN  - c/w Metoprolol    HLD  - c/w Lipitor    Prophylaxis:  DVT: Lovenox  GI: PO diet    I called & updated his son, Lavelle, [phone] on 3/17.

## 2021-03-19 LAB
ANION GAP SERPL CALC-SCNC: 9 MMOL/L — SIGNIFICANT CHANGE UP (ref 5–17)
BUN SERPL-MCNC: 14 MG/DL — SIGNIFICANT CHANGE UP (ref 7–23)
CALCIUM SERPL-MCNC: 9.2 MG/DL — SIGNIFICANT CHANGE UP (ref 8.5–10.1)
CHLORIDE SERPL-SCNC: 109 MMOL/L — HIGH (ref 96–108)
CO2 SERPL-SCNC: 21 MMOL/L — LOW (ref 22–31)
CREAT SERPL-MCNC: 0.91 MG/DL — SIGNIFICANT CHANGE UP (ref 0.5–1.3)
GLUCOSE SERPL-MCNC: 81 MG/DL — SIGNIFICANT CHANGE UP (ref 70–99)
HCT VFR BLD CALC: 33.9 % — LOW (ref 39–50)
HGB BLD-MCNC: 10.4 G/DL — LOW (ref 13–17)
MAGNESIUM SERPL-MCNC: 2.3 MG/DL — SIGNIFICANT CHANGE UP (ref 1.6–2.6)
MCHC RBC-ENTMCNC: 25.1 PG — LOW (ref 27–34)
MCHC RBC-ENTMCNC: 30.7 GM/DL — LOW (ref 32–36)
MCV RBC AUTO: 81.9 FL — SIGNIFICANT CHANGE UP (ref 80–100)
NRBC # BLD: 0 /100 WBCS — SIGNIFICANT CHANGE UP (ref 0–0)
PHOSPHATE SERPL-MCNC: 2.9 MG/DL — SIGNIFICANT CHANGE UP (ref 2.5–4.5)
PLATELET # BLD AUTO: 559 K/UL — HIGH (ref 150–400)
POTASSIUM SERPL-MCNC: 4 MMOL/L — SIGNIFICANT CHANGE UP (ref 3.5–5.3)
POTASSIUM SERPL-SCNC: 4 MMOL/L — SIGNIFICANT CHANGE UP (ref 3.5–5.3)
RBC # BLD: 4.14 M/UL — LOW (ref 4.2–5.8)
RBC # FLD: 17.2 % — HIGH (ref 10.3–14.5)
SODIUM SERPL-SCNC: 139 MMOL/L — SIGNIFICANT CHANGE UP (ref 135–145)
WBC # BLD: 6.02 K/UL — SIGNIFICANT CHANGE UP (ref 3.8–10.5)
WBC # FLD AUTO: 6.02 K/UL — SIGNIFICANT CHANGE UP (ref 3.8–10.5)

## 2021-03-19 PROCEDURE — 99231 SBSQ HOSP IP/OBS SF/LOW 25: CPT

## 2021-03-19 PROCEDURE — 99232 SBSQ HOSP IP/OBS MODERATE 35: CPT

## 2021-03-19 RX ORDER — DIPHENHYDRAMINE HCL 50 MG
50 CAPSULE ORAL ONCE
Refills: 0 | Status: COMPLETED | OUTPATIENT
Start: 2021-03-19 | End: 2021-03-19

## 2021-03-19 RX ORDER — HALOPERIDOL DECANOATE 100 MG/ML
5 INJECTION INTRAMUSCULAR ONCE
Refills: 0 | Status: COMPLETED | OUTPATIENT
Start: 2021-03-19 | End: 2021-03-19

## 2021-03-19 RX ADMIN — Medication 50 MILLIGRAM(S): at 11:47

## 2021-03-19 RX ADMIN — SODIUM CHLORIDE 75 MILLILITER(S): 9 INJECTION INTRAMUSCULAR; INTRAVENOUS; SUBCUTANEOUS at 17:15

## 2021-03-19 RX ADMIN — HALOPERIDOL DECANOATE 5 MILLIGRAM(S): 100 INJECTION INTRAMUSCULAR at 19:30

## 2021-03-19 RX ADMIN — Medication 2 MILLIGRAM(S): at 05:05

## 2021-03-19 RX ADMIN — Medication 2 MILLIGRAM(S): at 10:00

## 2021-03-19 RX ADMIN — Medication 100 MILLIGRAM(S): at 11:08

## 2021-03-19 RX ADMIN — ENOXAPARIN SODIUM 40 MILLIGRAM(S): 100 INJECTION SUBCUTANEOUS at 11:09

## 2021-03-19 RX ADMIN — AMPICILLIN SODIUM AND SULBACTAM SODIUM 200 GRAM(S): 250; 125 INJECTION, POWDER, FOR SUSPENSION INTRAMUSCULAR; INTRAVENOUS at 11:10

## 2021-03-19 RX ADMIN — Medication 2 MILLIGRAM(S): at 17:22

## 2021-03-19 RX ADMIN — HALOPERIDOL DECANOATE 5 MILLIGRAM(S): 100 INJECTION INTRAMUSCULAR at 11:46

## 2021-03-19 RX ADMIN — AMPICILLIN SODIUM AND SULBACTAM SODIUM 200 GRAM(S): 250; 125 INJECTION, POWDER, FOR SUSPENSION INTRAMUSCULAR; INTRAVENOUS at 17:14

## 2021-03-19 RX ADMIN — Medication 5 MILLIGRAM(S): at 05:06

## 2021-03-19 RX ADMIN — QUETIAPINE FUMARATE 25 MILLIGRAM(S): 200 TABLET, FILM COATED ORAL at 22:05

## 2021-03-19 RX ADMIN — AMPICILLIN SODIUM AND SULBACTAM SODIUM 200 GRAM(S): 250; 125 INJECTION, POWDER, FOR SUSPENSION INTRAMUSCULAR; INTRAVENOUS at 23:12

## 2021-03-19 RX ADMIN — AMPICILLIN SODIUM AND SULBACTAM SODIUM 200 GRAM(S): 250; 125 INJECTION, POWDER, FOR SUSPENSION INTRAMUSCULAR; INTRAVENOUS at 05:05

## 2021-03-19 RX ADMIN — Medication 1 TABLET(S): at 11:09

## 2021-03-19 RX ADMIN — Medication 5 MILLIGRAM(S): at 17:15

## 2021-03-19 RX ADMIN — Medication 1 MILLIGRAM(S): at 11:09

## 2021-03-19 RX ADMIN — ATORVASTATIN CALCIUM 20 MILLIGRAM(S): 80 TABLET, FILM COATED ORAL at 22:05

## 2021-03-19 RX ADMIN — Medication 50 MILLIGRAM(S): at 18:17

## 2021-03-19 NOTE — PROGRESS NOTE ADULT - SUBJECTIVE AND OBJECTIVE BOX
56 yo M with a history of chronic ETOH abuse w/ DTs with frequent hospital admissions, HLD, alcoholic gastritis/esophagitis/GERD/PUD & hx of hypoxic respiratory failure requiring intubation due to aspiration PNA, presented w/ nausea and vomiting and was admitted for ROSA & lactic acidosis due to dehydration. His hospital course was complicated by MSSA PNA & alcohol withdrawal with DTs requiring admission to the ICU on a Precedex gtt and phenobarb on 3/5. Pt improved and was transferred to medical floor on 3/15. He is lying in bed in NAD, still very confused.           MEDICATIONS  (STANDING):  ampicillin/sulbactam  IVPB 3 Gram(s) IV Intermittent every 6 hours  ampicillin/sulbactam  IVPB      atorvastatin 20 milliGRAM(s) Oral at bedtime  enoxaparin Injectable 40 milliGRAM(s) SubCutaneous daily  folic acid Injectable 1 milliGRAM(s) IV Push daily  metoprolol tartrate Injectable 5 milliGRAM(s) IV Push every 12 hours  multivitamin 1 Tablet(s) Oral daily  QUEtiapine 25 milliGRAM(s) Oral at bedtime  sodium chloride 0.9%. 1000 milliLiter(s) (75 mL/Hr) IV Continuous <Continuous>  thiamine Injectable 100 milliGRAM(s) IV Push daily    MEDICATIONS  (PRN):  acetaminophen   Tablet .. 650 milliGRAM(s) Oral every 6 hours PRN Mild Pain (1 - 3)  guaiFENesin   Syrup  (Sugar-Free) 200 milliGRAM(s) Oral every 6 hours PRN Cough  LORazepam   Injectable 2 milliGRAM(s) IV Push every 6 hours PRN Agitation  ondansetron Injectable 4 milliGRAM(s) IV Push every 6 hours PRN Nausea and/or Vomiting      Allergies    No Known Allergies    Intolerances        Vital Signs Last 24 Hrs  T(C): 36.9 (19 Mar 2021 16:36), Max: 36.9 (18 Mar 2021 22:32)  T(F): 98.4 (19 Mar 2021 16:36), Max: 98.4 (18 Mar 2021 22:32)  HR: 90 (19 Mar 2021 16:36) (83 - 97)  BP: 171/108 (19 Mar 2021 16:36) (143/76 - 171/108)   RR: 17 (19 Mar 2021 16:36) (17 - 18)  SpO2: 96% (19 Mar 2021 16:36) (94% - 96%)    PHYSICAL EXAM:  GENERAL: NAD, well-groomed, well-developed  HEAD:  Atraumatic, Normocephalic  EYES: EOMI, PERRLA,   NECK: Supple   NERVOUS SYSTEM:  Alert & Oriented to self and time, but still confused otherwise  CHEST/LUNG: Clear to auscultation bilaterally; No rales, rhonchi, wheezing, or rubs  HEART: Regular rate and rhythm; No murmurs, rubs, or gallops  ABDOMEN: Soft, Nontender, Nondistended; Bowel sounds present  EXTREMITIES: No clubbing, cyanosis, or edema    LABS:                        10.4   6.02  )-----------( 559      ( 19 Mar 2021 15:43 )             33.9     03-19    139  |  109<H>  |  14  ----------------------------<  81  4.0   |  21<L>  |  0.91    Ca    9.2      19 Mar 2021 15:43  Phos  2.9     03-19  Mg     2.3     03-19         RADIOLOGY & ADDITIONAL TESTS:    03-18-21 @ 07:01  -  03-19-21 @ 07:00  --------------------------------------------------------  IN:    IV PiggyBack: 100 mL    sodium chloride 0.9%: 825 mL  Total IN: 925 mL    OUT:    Voided (mL): 2 mL  Total OUT: 2 mL    Total NET: 923 mL      03-19-21 @ 07:01  -  03-19-21 @ 22:24  --------------------------------------------------------  IN:  Total IN: 0 mL    OUT:    Voided (mL): 400 mL  Total OUT: 400 mL    Total NET: -400 mL

## 2021-03-19 NOTE — PROGRESS NOTE ADULT - ASSESSMENT
56 yo M with a history of chronic ETOH abuse w/ DTs with frequent hospital admissions, HLD, alcoholic gastritis/esophagitis/GERD/PUD & hx of hypoxic respiratory failure requiring intubation due to aspiration PNA, presented w/ nausea and vomiting and was admitted for ROSA & lactic acidosis due to dehydration. His hospital course was complicated by MSSA PNA & alcohol withdrawal with DTs requiring admission to the ICU on a Precedex gtt and phenobarb on 3/5. Pt improved and was transferred to medical floor on 3/15.     Aspiration PNA on 3/17 w/ acute hypoxic respiratory failure   - CXR showed new left lower lung field infiltrate  - make patient NPO & suction  - taper off oxygen   - c/w Unasyn  - critical care note read and appreciated     Metabolic encephalopathy  - multifactorial delirium due to aspiration pneumonia, residual medications in system & prolonged hospital stay involving intubation  - c/w Seroquel w/ PRN ativan    Hx of Alcohol use w/ withdrawal  - patient is off Precedex and phenobarb  - finished librium taper   - c/w thiamin, folate and MVN    MSSA PNA  - s/p course of Unasyn  - CXR was negative but sputum grew MSSA    HTN  - c/w Metoprolol    HLD  - c/w Lipitor    Prophylaxis:  DVT: Lovenox  GI: PO diet    I called & updated his son, Lavelle, [phone] on 3/17.

## 2021-03-19 NOTE — PROGRESS NOTE BEHAVIORAL HEALTH - OTHER
PMA looks confused deferred at this time does not verbalize does not seem to be responding ot internal stimuli does not verbally engage with Writer unable to ascertain due to limitation of exam

## 2021-03-20 LAB
ANION GAP SERPL CALC-SCNC: 8 MMOL/L — SIGNIFICANT CHANGE UP (ref 5–17)
BUN SERPL-MCNC: 13 MG/DL — SIGNIFICANT CHANGE UP (ref 7–23)
CALCIUM SERPL-MCNC: 9 MG/DL — SIGNIFICANT CHANGE UP (ref 8.5–10.1)
CHLORIDE SERPL-SCNC: 110 MMOL/L — HIGH (ref 96–108)
CO2 SERPL-SCNC: 22 MMOL/L — SIGNIFICANT CHANGE UP (ref 22–31)
CREAT SERPL-MCNC: 1.03 MG/DL — SIGNIFICANT CHANGE UP (ref 0.5–1.3)
GLUCOSE SERPL-MCNC: 76 MG/DL — SIGNIFICANT CHANGE UP (ref 70–99)
HCT VFR BLD CALC: 31.4 % — LOW (ref 39–50)
HGB BLD-MCNC: 9.9 G/DL — LOW (ref 13–17)
MAGNESIUM SERPL-MCNC: 2 MG/DL — SIGNIFICANT CHANGE UP (ref 1.6–2.6)
MCHC RBC-ENTMCNC: 25.3 PG — LOW (ref 27–34)
MCHC RBC-ENTMCNC: 31.5 GM/DL — LOW (ref 32–36)
MCV RBC AUTO: 80.3 FL — SIGNIFICANT CHANGE UP (ref 80–100)
NRBC # BLD: 0 /100 WBCS — SIGNIFICANT CHANGE UP (ref 0–0)
PHOSPHATE SERPL-MCNC: 3.2 MG/DL — SIGNIFICANT CHANGE UP (ref 2.5–4.5)
PLATELET # BLD AUTO: 522 K/UL — HIGH (ref 150–400)
POTASSIUM SERPL-MCNC: 3.6 MMOL/L — SIGNIFICANT CHANGE UP (ref 3.5–5.3)
POTASSIUM SERPL-SCNC: 3.6 MMOL/L — SIGNIFICANT CHANGE UP (ref 3.5–5.3)
RBC # BLD: 3.91 M/UL — LOW (ref 4.2–5.8)
RBC # FLD: 17 % — HIGH (ref 10.3–14.5)
SODIUM SERPL-SCNC: 140 MMOL/L — SIGNIFICANT CHANGE UP (ref 135–145)
WBC # BLD: 6 K/UL — SIGNIFICANT CHANGE UP (ref 3.8–10.5)
WBC # FLD AUTO: 6 K/UL — SIGNIFICANT CHANGE UP (ref 3.8–10.5)

## 2021-03-20 PROCEDURE — 99232 SBSQ HOSP IP/OBS MODERATE 35: CPT

## 2021-03-20 RX ORDER — POTASSIUM CHLORIDE 20 MEQ
40 PACKET (EA) ORAL ONCE
Refills: 0 | Status: COMPLETED | OUTPATIENT
Start: 2021-03-20 | End: 2021-03-20

## 2021-03-20 RX ADMIN — Medication 40 MILLIEQUIVALENT(S): at 21:33

## 2021-03-20 RX ADMIN — SODIUM CHLORIDE 75 MILLILITER(S): 9 INJECTION INTRAMUSCULAR; INTRAVENOUS; SUBCUTANEOUS at 05:11

## 2021-03-20 RX ADMIN — AMPICILLIN SODIUM AND SULBACTAM SODIUM 200 GRAM(S): 250; 125 INJECTION, POWDER, FOR SUSPENSION INTRAMUSCULAR; INTRAVENOUS at 12:56

## 2021-03-20 RX ADMIN — QUETIAPINE FUMARATE 25 MILLIGRAM(S): 200 TABLET, FILM COATED ORAL at 21:23

## 2021-03-20 RX ADMIN — AMPICILLIN SODIUM AND SULBACTAM SODIUM 200 GRAM(S): 250; 125 INJECTION, POWDER, FOR SUSPENSION INTRAMUSCULAR; INTRAVENOUS at 05:11

## 2021-03-20 RX ADMIN — AMPICILLIN SODIUM AND SULBACTAM SODIUM 200 GRAM(S): 250; 125 INJECTION, POWDER, FOR SUSPENSION INTRAMUSCULAR; INTRAVENOUS at 17:34

## 2021-03-20 RX ADMIN — Medication 2 MILLIGRAM(S): at 00:48

## 2021-03-20 RX ADMIN — Medication 5 MILLIGRAM(S): at 05:11

## 2021-03-20 RX ADMIN — Medication 1 MILLIGRAM(S): at 13:00

## 2021-03-20 RX ADMIN — ENOXAPARIN SODIUM 40 MILLIGRAM(S): 100 INJECTION SUBCUTANEOUS at 13:01

## 2021-03-20 RX ADMIN — Medication 2 MILLIGRAM(S): at 08:36

## 2021-03-20 RX ADMIN — Medication 2 MILLIGRAM(S): at 18:16

## 2021-03-20 RX ADMIN — Medication 2 MILLIGRAM(S): at 02:01

## 2021-03-20 RX ADMIN — ATORVASTATIN CALCIUM 20 MILLIGRAM(S): 80 TABLET, FILM COATED ORAL at 21:23

## 2021-03-20 RX ADMIN — Medication 100 MILLIGRAM(S): at 13:00

## 2021-03-20 RX ADMIN — Medication 5 MILLIGRAM(S): at 17:34

## 2021-03-20 NOTE — PROGRESS NOTE ADULT - SUBJECTIVE AND OBJECTIVE BOX
54 yo M with a history of chronic ETOH abuse w/ DTs with frequent hospital admissions, HLD, alcoholic gastritis/esophagitis/GERD/PUD & hx of hypoxic respiratory failure requiring intubation due to aspiration PNA, presented w/ nausea and vomiting and was admitted for ROSA & lactic acidosis due to dehydration. His hospital course was complicated by MSSA PNA & alcohol withdrawal with DTs requiring admission to the ICU on a Precedex gtt and phenobarb on 3/5. Pt improved and was transferred to medical floor on 3/15. He is lying in bed in NAD, still very confused.      MEDICATIONS  (STANDING):  ampicillin/sulbactam  IVPB 3 Gram(s) IV Intermittent every 6 hours  ampicillin/sulbactam  IVPB      atorvastatin 20 milliGRAM(s) Oral at bedtime  enoxaparin Injectable 40 milliGRAM(s) SubCutaneous daily  folic acid Injectable 1 milliGRAM(s) IV Push daily  metoprolol tartrate Injectable 5 milliGRAM(s) IV Push every 12 hours  multivitamin 1 Tablet(s) Oral daily  QUEtiapine 25 milliGRAM(s) Oral at bedtime  sodium chloride 0.9%. 1000 milliLiter(s) (75 mL/Hr) IV Continuous <Continuous>  thiamine Injectable 100 milliGRAM(s) IV Push daily    MEDICATIONS  (PRN):  acetaminophen   Tablet .. 650 milliGRAM(s) Oral every 6 hours PRN Mild Pain (1 - 3)  guaiFENesin   Syrup  (Sugar-Free) 200 milliGRAM(s) Oral every 6 hours PRN Cough  LORazepam   Injectable 2 milliGRAM(s) IV Push every 6 hours PRN Agitation  ondansetron Injectable 4 milliGRAM(s) IV Push every 6 hours PRN Nausea and/or Vomiting      Allergies    No Known Allergies    Intolerances        Vital Signs Last 24 Hrs  T(C): 36.4 (20 Mar 2021 17:00), Max: 37.1 (20 Mar 2021 01:00)  T(F): 97.5 (20 Mar 2021 17:00), Max: 98.7 (20 Mar 2021 01:00)  HR: 94 (20 Mar 2021 17:00) (83 - 98)  BP: 159/73 (20 Mar 2021 17:00) (122/71 - 159/73)   RR: 18 (20 Mar 2021 17:00) (18 - 18)  SpO2: 95% (20 Mar 2021 17:00) (94% - 95%)    PHYSICAL EXAM:  GENERAL: NAD, well-groomed, well-developed  HEAD:  Atraumatic, Normocephalic  EYES: EOMI, PERRLA,   NECK: Supple   NERVOUS SYSTEM:  Alert & confused    CHEST/LUNG: Clear to auscultation bilaterally; No rales, rhonchi, wheezing, or rubs  HEART: Regular rate and rhythm; No murmurs, rubs, or gallops  ABDOMEN: Soft, Nontender, Nondistended; Bowel sounds present  EXTREMITIES: No clubbing, cyanosis, or edema      LABS:                        9.9    6.00  )-----------( 522      ( 20 Mar 2021 11:10 )             31.4     03-20    140  |  110<H>  |  13  ----------------------------<  76  3.6   |  22  |  1.03    Ca    9.0      20 Mar 2021 11:10  Phos  3.2     03-20  Mg     2.0     03-20          CAPILLARY BLOOD GLUCOSE          RADIOLOGY & ADDITIONAL TESTS:    03-19-21 @ 07:01  -  03-20-21 @ 07:00  --------------------------------------------------------  IN:    IV PiggyBack: 200 mL    sodium chloride 0.9%: 900 mL  Total IN: 1100 mL    OUT:    Oral Fluid: 0 mL    Voided (mL): 400 mL  Total OUT: 400 mL    Total NET: 700 mL

## 2021-03-20 NOTE — CHART NOTE - NSCHARTNOTEFT_GEN_A_CORE
Nutrition follow-up note-    Pt adm w/ETOH withdrawal & ROSA from dehydration. PMHx includes- frequent hospitalizations due to ETOH abuse, HLD, alcoholic gastritis/esophagitis, GERD, PUD & hypoxic respiratory failure -->intubation 2/2 aspiration pna. Per chart pt is confused, disoriented, restless, and agitated. Pt unable to engage in conversation, seems lethargic, sleepy. Per chart pt w/aspiration pna on 3/17, made NPO (currently NPO x 3 days).     Factors impacting intake: [ ] none [ ] nausea  [ ] vomiting [ ] diarrhea [ ] constipation  [ ]chewing problems [ ] swallowing issues  [x ] other: s/p aspiration pna, Pt currently NPO.    Diet Prescription: Diet, NPO:   Except Medications (03-17-21 @ 15:44)    Intake: Zero    Current Weight: (3/19) 73.7 kg, (3/4 adm) 75.2 kg.  % Weight Change: 1.9% loss (1.5 kg) x 17 days/since admission.    Physical appearance: no edema documented since (3/15). Noted pt w/fluctuating edema previously. Weight change likely edema-related.  Nutrition focused physical exam conducted:  Subcutaneous fat loss: [mild ] Orbital fat pads region, [WDL ]Buccal fat region, [unable ]Triceps region,  [unable ]Ribs region.  Muscle wasting: [WDL ]Temples region, [ mild]Clavicle region, [unable ]Shoulder region, [unable ]Scapula region, [unable ]Interosseous region,  [unable ]thigh region, [unable ]Calf region.    Pertinent Medications: MEDICATIONS  (STANDING):  ampicillin/sulbactam  IVPB 3 Gram(s) IV Intermittent every 6 hours  ampicillin/sulbactam  IVPB      atorvastatin 20 milliGRAM(s) Oral at bedtime  enoxaparin Injectable 40 milliGRAM(s) SubCutaneous daily  folic acid Injectable 1 milliGRAM(s) IV Push daily  metoprolol tartrate Injectable 5 milliGRAM(s) IV Push every 12 hours  multivitamin 1 Tablet(s) Oral daily  QUEtiapine 25 milliGRAM(s) Oral at bedtime  sodium chloride 0.9%. 1000 milliLiter(s) (75 mL/Hr) IV Continuous <Continuous>  thiamine Injectable 100 milliGRAM(s) IV Push daily    MEDICATIONS  (PRN):  acetaminophen   Tablet .. 650 milliGRAM(s) Oral every 6 hours PRN Mild Pain (1 - 3)  guaiFENesin   Syrup  (Sugar-Free) 200 milliGRAM(s) Oral every 6 hours PRN Cough  LORazepam   Injectable 2 milliGRAM(s) IV Push every 6 hours PRN Agitation  ondansetron Injectable 4 milliGRAM(s) IV Push every 6 hours PRN Nausea and/or Vomiting    Pertinent Labs: 03-19 Na139 mmol/L Glu 81 mg/dL K+ 4.0 mmol/L Cr  0.91 mg/dL BUN 14 mg/dL 03-19 Phos 2.9 mg/dL 03-15 Alb 2.8 g/dL<L>      Skin: no pressure ulcers.    Estimated Needs:   [x ] no change since previous assessment (3/5)  [ ] recalculated:     Previous Nutrition Diagnosis:   [x ] Malnutrition; moderate malnutrition in context of acute illness     Related to: inadequate protein-energy intake in setting of alcohol withdrawal, ROSA, gastritis, staph pneumonia    As evidenced by: <75% nutrition needs > 7 days, 2+/mild edema of arms, hands, mild fat/muscle loss    Goal: pt to consume >75% of meals & supplement (GOAL not being met, pt is NPO).    Nutrition Diagnosis is [x ] ongoing  [ ] resolved [ ] not applicable     New Nutrition Diagnosis: [x ] not applicable      Interventions:   Recommend:  [x] Continue NPO as medically indicated.   [x ] Change Diet To: Consider Swallow evaluation for appropriate diet/fluid consistency prior to re-starting PO diet. If/when medically feasible (& as per SLP recommendation) initiate Clear liquids. As tolerated, advance diet to- Low Sodium, Soft. Once diet is advanced add PO supplement- Ensure Enlive x 2/day (provides 700 kcal, 40 g protein).  [ ] Nutrition Supplement  [ ] Nutrition Support  [ ] Other:     Monitoring and Evaluation:   [ ] PO intake [ x ] Tolerance to diet prescription [ x ] weights [ x ] labs[ x ] follow up per protocol  [ ] other:

## 2021-03-20 NOTE — PROGRESS NOTE ADULT - ASSESSMENT
56 yo M with a history of chronic ETOH abuse w/ DTs with frequent hospital admissions, HLD, alcoholic gastritis/esophagitis/GERD/PUD & hx of hypoxic respiratory failure requiring intubation due to aspiration PNA, presented w/ nausea and vomiting and was admitted for ROSA & lactic acidosis due to dehydration. His hospital course was complicated by MSSA PNA & alcohol withdrawal with DTs requiring admission to the ICU on a Precedex gtt and phenobarb on 3/5. Pt improved and was transferred to medical floor on 3/15.     Aspiration PNA on 3/17 w/ acute hypoxic respiratory failure   - CXR showed new left lower lung field infiltrate  - make patient NPO & suction  - taper off oxygen   - c/w Unasyn  - critical care note read and appreciated     Metabolic encephalopathy  - multifactorial delirium due to aspiration pneumonia, residual medications in system & prolonged hospital stay involving intubation  - c/w Seroquel w/ PRN ativan    Hx of Alcohol use w/ withdrawal  - patient is off Precedex and phenobarb  - finished librium taper   - c/w thiamin, folate and MVN    MSSA PNA  - s/p course of Unasyn  - CXR was negative but sputum grew MSSA    HTN  - c/w Metoprolol    HLD  - c/w Lipitor    Prophylaxis:  DVT: Lovenox  GI: PO diet    NOK: Lavelle Whitney, [phone]

## 2021-03-21 LAB
ANION GAP SERPL CALC-SCNC: 9 MMOL/L — SIGNIFICANT CHANGE UP (ref 5–17)
BUN SERPL-MCNC: 10 MG/DL — SIGNIFICANT CHANGE UP (ref 7–23)
CALCIUM SERPL-MCNC: 9.2 MG/DL — SIGNIFICANT CHANGE UP (ref 8.5–10.1)
CHLORIDE SERPL-SCNC: 109 MMOL/L — HIGH (ref 96–108)
CO2 SERPL-SCNC: 22 MMOL/L — SIGNIFICANT CHANGE UP (ref 22–31)
CREAT SERPL-MCNC: 0.93 MG/DL — SIGNIFICANT CHANGE UP (ref 0.5–1.3)
GLUCOSE SERPL-MCNC: 61 MG/DL — LOW (ref 70–99)
HCT VFR BLD CALC: 32.1 % — LOW (ref 39–50)
HGB BLD-MCNC: 10 G/DL — LOW (ref 13–17)
MAGNESIUM SERPL-MCNC: 2.1 MG/DL — SIGNIFICANT CHANGE UP (ref 1.6–2.6)
MCHC RBC-ENTMCNC: 24.9 PG — LOW (ref 27–34)
MCHC RBC-ENTMCNC: 31.2 GM/DL — LOW (ref 32–36)
MCV RBC AUTO: 80 FL — SIGNIFICANT CHANGE UP (ref 80–100)
NRBC # BLD: 0 /100 WBCS — SIGNIFICANT CHANGE UP (ref 0–0)
PHOSPHATE SERPL-MCNC: 3.2 MG/DL — SIGNIFICANT CHANGE UP (ref 2.5–4.5)
PLATELET # BLD AUTO: 681 K/UL — HIGH (ref 150–400)
POTASSIUM SERPL-MCNC: 3.7 MMOL/L — SIGNIFICANT CHANGE UP (ref 3.5–5.3)
POTASSIUM SERPL-SCNC: 3.7 MMOL/L — SIGNIFICANT CHANGE UP (ref 3.5–5.3)
RBC # BLD: 4.01 M/UL — LOW (ref 4.2–5.8)
RBC # FLD: 16.6 % — HIGH (ref 10.3–14.5)
SODIUM SERPL-SCNC: 140 MMOL/L — SIGNIFICANT CHANGE UP (ref 135–145)
WBC # BLD: 4.68 K/UL — SIGNIFICANT CHANGE UP (ref 3.8–10.5)
WBC # FLD AUTO: 4.68 K/UL — SIGNIFICANT CHANGE UP (ref 3.8–10.5)

## 2021-03-21 PROCEDURE — 99232 SBSQ HOSP IP/OBS MODERATE 35: CPT

## 2021-03-21 RX ORDER — DIPHENHYDRAMINE HCL 50 MG
50 CAPSULE ORAL ONCE
Refills: 0 | Status: COMPLETED | OUTPATIENT
Start: 2021-03-21 | End: 2021-03-21

## 2021-03-21 RX ORDER — HALOPERIDOL DECANOATE 100 MG/ML
5 INJECTION INTRAMUSCULAR ONCE
Refills: 0 | Status: COMPLETED | OUTPATIENT
Start: 2021-03-21 | End: 2021-03-21

## 2021-03-21 RX ORDER — HALOPERIDOL DECANOATE 100 MG/ML
5 INJECTION INTRAMUSCULAR EVERY 4 HOURS
Refills: 0 | Status: DISCONTINUED | OUTPATIENT
Start: 2021-03-21 | End: 2021-03-24

## 2021-03-21 RX ADMIN — Medication 1 TABLET(S): at 13:27

## 2021-03-21 RX ADMIN — Medication 2 MILLIGRAM(S): at 13:32

## 2021-03-21 RX ADMIN — Medication 2 MILLIGRAM(S): at 03:27

## 2021-03-21 RX ADMIN — AMPICILLIN SODIUM AND SULBACTAM SODIUM 200 GRAM(S): 250; 125 INJECTION, POWDER, FOR SUSPENSION INTRAMUSCULAR; INTRAVENOUS at 13:22

## 2021-03-21 RX ADMIN — HALOPERIDOL DECANOATE 5 MILLIGRAM(S): 100 INJECTION INTRAMUSCULAR at 01:45

## 2021-03-21 RX ADMIN — QUETIAPINE FUMARATE 25 MILLIGRAM(S): 200 TABLET, FILM COATED ORAL at 22:19

## 2021-03-21 RX ADMIN — Medication 2 MILLIGRAM(S): at 07:39

## 2021-03-21 RX ADMIN — Medication 5 MILLIGRAM(S): at 05:48

## 2021-03-21 RX ADMIN — Medication 5 MILLIGRAM(S): at 18:39

## 2021-03-21 RX ADMIN — ENOXAPARIN SODIUM 40 MILLIGRAM(S): 100 INJECTION SUBCUTANEOUS at 13:27

## 2021-03-21 RX ADMIN — Medication 2 MILLIGRAM(S): at 00:35

## 2021-03-21 RX ADMIN — ATORVASTATIN CALCIUM 20 MILLIGRAM(S): 80 TABLET, FILM COATED ORAL at 22:19

## 2021-03-21 RX ADMIN — Medication 100 MILLIGRAM(S): at 13:27

## 2021-03-21 RX ADMIN — Medication 1 MILLIGRAM(S): at 13:26

## 2021-03-21 RX ADMIN — AMPICILLIN SODIUM AND SULBACTAM SODIUM 200 GRAM(S): 250; 125 INJECTION, POWDER, FOR SUSPENSION INTRAMUSCULAR; INTRAVENOUS at 00:39

## 2021-03-21 RX ADMIN — Medication 50 MILLIGRAM(S): at 01:45

## 2021-03-21 RX ADMIN — AMPICILLIN SODIUM AND SULBACTAM SODIUM 200 GRAM(S): 250; 125 INJECTION, POWDER, FOR SUSPENSION INTRAMUSCULAR; INTRAVENOUS at 05:50

## 2021-03-21 RX ADMIN — AMPICILLIN SODIUM AND SULBACTAM SODIUM 200 GRAM(S): 250; 125 INJECTION, POWDER, FOR SUSPENSION INTRAMUSCULAR; INTRAVENOUS at 18:39

## 2021-03-21 RX ADMIN — SODIUM CHLORIDE 75 MILLILITER(S): 9 INJECTION INTRAMUSCULAR; INTRAVENOUS; SUBCUTANEOUS at 02:17

## 2021-03-21 NOTE — PROGRESS NOTE ADULT - SUBJECTIVE AND OBJECTIVE BOX
54 yo M with a history of chronic ETOH abuse w/ DTs with frequent hospital admissions, HLD, alcoholic gastritis/esophagitis/GERD/PUD & hx of hypoxic respiratory failure requiring intubation due to aspiration PNA, presented w/ nausea and vomiting and was admitted for ROSA & lactic acidosis due to dehydration. His hospital course was complicated by MSSA PNA & alcohol withdrawal with DTs requiring admission to the ICU on a Precedex gtt and phenobarb on 3/5. Pt improved and was transferred to medical floor on 3/15. He is lying in bed in NAD, still very confused.      MEDICATIONS  (STANDING):  ampicillin/sulbactam  IVPB      ampicillin/sulbactam  IVPB 3 Gram(s) IV Intermittent every 6 hours  atorvastatin 20 milliGRAM(s) Oral at bedtime  enoxaparin Injectable 40 milliGRAM(s) SubCutaneous daily  folic acid Injectable 1 milliGRAM(s) IV Push daily  metoprolol tartrate Injectable 5 milliGRAM(s) IV Push every 12 hours  multivitamin 1 Tablet(s) Oral daily  QUEtiapine 25 milliGRAM(s) Oral at bedtime  sodium chloride 0.9%. 1000 milliLiter(s) (75 mL/Hr) IV Continuous <Continuous>  thiamine Injectable 100 milliGRAM(s) IV Push daily    MEDICATIONS  (PRN):  acetaminophen   Tablet .. 650 milliGRAM(s) Oral every 6 hours PRN Mild Pain (1 - 3)  guaiFENesin   Syrup  (Sugar-Free) 200 milliGRAM(s) Oral every 6 hours PRN Cough  LORazepam   Injectable 2 milliGRAM(s) IV Push every 6 hours PRN Agitation  ondansetron Injectable 4 milliGRAM(s) IV Push every 6 hours PRN Nausea and/or Vomiting      Allergies    No Known Allergies       Vital Signs Last 24 Hrs  T(C): 37.4 (21 Mar 2021 16:50), Max: 37.4 (21 Mar 2021 16:50)  T(F): 99.4 (21 Mar 2021 16:50), Max: 99.4 (21 Mar 2021 16:50)  HR: 80 (21 Mar 2021 16:50) (73 - 88)  BP: 136/70 (21 Mar 2021 16:50) (136/70 - 158/97)   RR: 18 (21 Mar 2021 16:50) (18 - 18)  SpO2: 94% (21 Mar 2021 16:50) (94% - 97%)    PHYSICAL EXAM:  GENERAL: NAD, well-groomed, well-developed  HEAD:  Atraumatic, Normocephalic  EYES: EOMI, PERRLA,   NECK: Supple   NERVOUS SYSTEM:  Sedated   CHEST/LUNG: Clear to auscultation bilaterally; No rales, rhonchi, wheezing, or rubs  HEART: Regular rate and rhythm; No murmurs, rubs, or gallops  ABDOMEN: Soft, Nontender, Nondistended; Bowel sounds present  EXTREMITIES: No clubbing, cyanosis, or edema      LABS:                        10.0   4.68  )-----------( 681      ( 21 Mar 2021 07:47 )             32.1     03-21    140  |  109<H>  |  10  ----------------------------<  61<L>  3.7   |  22  |  0.93    Ca    9.2      21 Mar 2021 07:47  Phos  3.2     03-21  Mg     2.1     03-21

## 2021-03-21 NOTE — PROGRESS NOTE ADULT - ASSESSMENT
54 yo M with a history of chronic ETOH abuse w/ DTs with frequent hospital admissions, HLD, alcoholic gastritis/esophagitis/GERD/PUD & hx of hypoxic respiratory failure requiring intubation due to aspiration PNA, presented w/ nausea and vomiting and was admitted for ROSA & lactic acidosis due to dehydration. His hospital course was complicated by MSSA PNA & alcohol withdrawal with DTs requiring admission to the ICU on a Precedex gtt and phenobarb on 3/5. Pt improved and was transferred to medical floor on 3/15.     Aspiration PNA on 3/17 w/ acute hypoxic respiratory failure   - CXR showed new left lower lung field infiltrate  - c/w NPO - will get swallow eval once mentation improves  - tapered off oxygen   - c/w Unasyn  - critical care note read and appreciated     Metabolic encephalopathy  - multifactorial delirium due to aspiration pneumonia, residual medications in system & prolonged hospital stay involving intubation  - c/w Seroquel   - as per psych, will switch PRN ativan to PRN haldol as the ativan may be contributing to his encephalopathy     Hx of Alcohol use w/ withdrawal  - patient is off Precedex and phenobarb  - finished librium taper   - c/w thiamin, folate and MVN    MSSA PNA  - s/p course of Unasyn  - CXR was negative but sputum grew MSSA    HTN  - c/w Metoprolol    HLD  - c/w Lipitor    Prophylaxis:  DVT: Lovenox  GI: PO diet    NOK: Lavelle Whitney, [phone]

## 2021-03-22 LAB
ANION GAP SERPL CALC-SCNC: 11 MMOL/L — SIGNIFICANT CHANGE UP (ref 5–17)
BUN SERPL-MCNC: 11 MG/DL — SIGNIFICANT CHANGE UP (ref 7–23)
CALCIUM SERPL-MCNC: 8.8 MG/DL — SIGNIFICANT CHANGE UP (ref 8.5–10.1)
CHLORIDE SERPL-SCNC: 106 MMOL/L — SIGNIFICANT CHANGE UP (ref 96–108)
CO2 SERPL-SCNC: 21 MMOL/L — LOW (ref 22–31)
CREAT SERPL-MCNC: 0.96 MG/DL — SIGNIFICANT CHANGE UP (ref 0.5–1.3)
GLUCOSE SERPL-MCNC: 57 MG/DL — LOW (ref 70–99)
HCT VFR BLD CALC: 33.7 % — LOW (ref 39–50)
HGB BLD-MCNC: 10.9 G/DL — LOW (ref 13–17)
MAGNESIUM SERPL-MCNC: 1.8 MG/DL — SIGNIFICANT CHANGE UP (ref 1.6–2.6)
MCHC RBC-ENTMCNC: 25.8 PG — LOW (ref 27–34)
MCHC RBC-ENTMCNC: 32.3 GM/DL — SIGNIFICANT CHANGE UP (ref 32–36)
MCV RBC AUTO: 79.9 FL — LOW (ref 80–100)
NRBC # BLD: 0 /100 WBCS — SIGNIFICANT CHANGE UP (ref 0–0)
PHOSPHATE SERPL-MCNC: 2.7 MG/DL — SIGNIFICANT CHANGE UP (ref 2.5–4.5)
PLATELET # BLD AUTO: 681 K/UL — HIGH (ref 150–400)
POTASSIUM SERPL-MCNC: 3.3 MMOL/L — LOW (ref 3.5–5.3)
POTASSIUM SERPL-SCNC: 3.3 MMOL/L — LOW (ref 3.5–5.3)
RBC # BLD: 4.22 M/UL — SIGNIFICANT CHANGE UP (ref 4.2–5.8)
RBC # FLD: 16.3 % — HIGH (ref 10.3–14.5)
SODIUM SERPL-SCNC: 138 MMOL/L — SIGNIFICANT CHANGE UP (ref 135–145)
WBC # BLD: 7.04 K/UL — SIGNIFICANT CHANGE UP (ref 3.8–10.5)
WBC # FLD AUTO: 7.04 K/UL — SIGNIFICANT CHANGE UP (ref 3.8–10.5)

## 2021-03-22 PROCEDURE — 93010 ELECTROCARDIOGRAM REPORT: CPT

## 2021-03-22 PROCEDURE — 99231 SBSQ HOSP IP/OBS SF/LOW 25: CPT

## 2021-03-22 PROCEDURE — 99232 SBSQ HOSP IP/OBS MODERATE 35: CPT

## 2021-03-22 RX ORDER — POTASSIUM CHLORIDE 20 MEQ
10 PACKET (EA) ORAL
Refills: 0 | Status: COMPLETED | OUTPATIENT
Start: 2021-03-22 | End: 2021-03-22

## 2021-03-22 RX ORDER — IBUPROFEN 200 MG
600 TABLET ORAL ONCE
Refills: 0 | Status: COMPLETED | OUTPATIENT
Start: 2021-03-22 | End: 2021-03-22

## 2021-03-22 RX ADMIN — AMPICILLIN SODIUM AND SULBACTAM SODIUM 200 GRAM(S): 250; 125 INJECTION, POWDER, FOR SUSPENSION INTRAMUSCULAR; INTRAVENOUS at 11:38

## 2021-03-22 RX ADMIN — Medication 600 MILLIGRAM(S): at 21:23

## 2021-03-22 RX ADMIN — Medication 100 MILLIGRAM(S): at 11:39

## 2021-03-22 RX ADMIN — Medication 600 MILLIGRAM(S): at 22:32

## 2021-03-22 RX ADMIN — Medication 1 MILLIGRAM(S): at 11:39

## 2021-03-22 RX ADMIN — QUETIAPINE FUMARATE 25 MILLIGRAM(S): 200 TABLET, FILM COATED ORAL at 21:23

## 2021-03-22 RX ADMIN — AMPICILLIN SODIUM AND SULBACTAM SODIUM 200 GRAM(S): 250; 125 INJECTION, POWDER, FOR SUSPENSION INTRAMUSCULAR; INTRAVENOUS at 01:20

## 2021-03-22 RX ADMIN — Medication 100 MILLIEQUIVALENT(S): at 20:46

## 2021-03-22 RX ADMIN — Medication 1 TABLET(S): at 11:39

## 2021-03-22 RX ADMIN — ENOXAPARIN SODIUM 40 MILLIGRAM(S): 100 INJECTION SUBCUTANEOUS at 11:39

## 2021-03-22 RX ADMIN — Medication 650 MILLIGRAM(S): at 17:06

## 2021-03-22 RX ADMIN — HALOPERIDOL DECANOATE 5 MILLIGRAM(S): 100 INJECTION INTRAMUSCULAR at 01:20

## 2021-03-22 RX ADMIN — Medication 100 MILLIEQUIVALENT(S): at 21:44

## 2021-03-22 RX ADMIN — AMPICILLIN SODIUM AND SULBACTAM SODIUM 200 GRAM(S): 250; 125 INJECTION, POWDER, FOR SUSPENSION INTRAMUSCULAR; INTRAVENOUS at 05:32

## 2021-03-22 RX ADMIN — ATORVASTATIN CALCIUM 20 MILLIGRAM(S): 80 TABLET, FILM COATED ORAL at 21:23

## 2021-03-22 RX ADMIN — AMPICILLIN SODIUM AND SULBACTAM SODIUM 200 GRAM(S): 250; 125 INJECTION, POWDER, FOR SUSPENSION INTRAMUSCULAR; INTRAVENOUS at 17:06

## 2021-03-22 RX ADMIN — Medication 5 MILLIGRAM(S): at 05:31

## 2021-03-22 RX ADMIN — Medication 100 MILLIEQUIVALENT(S): at 19:43

## 2021-03-22 RX ADMIN — Medication 5 MILLIGRAM(S): at 17:06

## 2021-03-22 NOTE — PROGRESS NOTE ADULT - SUBJECTIVE AND OBJECTIVE BOX
54 yo M with a history of chronic ETOH abuse w/ DTs with frequent hospital admissions, HLD, alcoholic gastritis/esophagitis/GERD/PUD & hx of hypoxic respiratory failure requiring intubation due to aspiration PNA, presented w/ nausea and vomiting and was admitted for ROSA & lactic acidosis due to dehydration. His hospital course was complicated by MSSA PNA & alcohol withdrawal with DTs requiring admission to the ICU on a Precedex gtt and phenobarb on 3/5. Pt improved and was transferred to medical floor on 3/15. He is lying in bed in NAD, confusion improving.     MEDICATIONS  (STANDING):  ampicillin/sulbactam  IVPB      ampicillin/sulbactam  IVPB 3 Gram(s) IV Intermittent every 6 hours  atorvastatin 20 milliGRAM(s) Oral at bedtime  enoxaparin Injectable 40 milliGRAM(s) SubCutaneous daily  folic acid Injectable 1 milliGRAM(s) IV Push daily  metoprolol tartrate Injectable 5 milliGRAM(s) IV Push every 12 hours  multivitamin 1 Tablet(s) Oral daily  QUEtiapine 25 milliGRAM(s) Oral at bedtime  sodium chloride 0.9%. 1000 milliLiter(s) (75 mL/Hr) IV Continuous <Continuous>  thiamine Injectable 100 milliGRAM(s) IV Push daily    MEDICATIONS  (PRN):  acetaminophen   Tablet .. 650 milliGRAM(s) Oral every 6 hours PRN Mild Pain (1 - 3)  guaiFENesin   Syrup  (Sugar-Free) 200 milliGRAM(s) Oral every 6 hours PRN Cough  haloperidol    Injectable 5 milliGRAM(s) IntraMuscular every 4 hours PRN Agitation  ondansetron Injectable 4 milliGRAM(s) IV Push every 6 hours PRN Nausea and/or Vomiting      Allergies    No Known Allergies    Intolerances        Vital Signs Last 24 Hrs  T(C): 36.8 (22 Mar 2021 17:00), Max: 37 (22 Mar 2021 00:00)  T(F): 98.3 (22 Mar 2021 17:00), Max: 98.6 (22 Mar 2021 00:00)  HR: 59 (22 Mar 2021 17:00) (59 - 87)  BP: 163/87 (22 Mar 2021 17:00) (145/73 - 163/87)   RR: 20 (22 Mar 2021 17:00) (18 - 20)  SpO2: 91% (22 Mar 2021 17:00) (91% - 96%)    PHYSICAL EXAM:  GENERAL: NAD, well-groomed, well-developed  HEAD:  Atraumatic, Normocephalic  EYES: EOMI, PERRLA,   NECK: Supple   NERVOUS SYSTEM: more alert and awake  CHEST/LUNG: Clear to auscultation bilaterally; No rales, rhonchi, wheezing, or rubs  HEART: Regular rate and rhythm; No murmurs, rubs, or gallops  ABDOMEN: Soft, Nontender, Nondistended; Bowel sounds present  EXTREMITIES: No clubbing, cyanosis, or edema    LABS:                        10.9   7.04  )-----------( 681      ( 22 Mar 2021 09:58 )             33.7     03-22    138  |  106  |  11  ----------------------------<  57<L>  3.3<L>   |  21<L>  |  0.96    Ca    8.8      22 Mar 2021 09:58  Phos  2.7     03-22  Mg     1.8     03-22          CAPILLARY BLOOD GLUCOSE          RADIOLOGY & ADDITIONAL TESTS:

## 2021-03-22 NOTE — PROGRESS NOTE BEHAVIORAL HEALTH - OTHER
deferred at this time improving "ok" improved getting more linear slightly slowed low end of fair - improved varying - on the louder side of normal at times

## 2021-03-22 NOTE — PROGRESS NOTE ADULT - ASSESSMENT
54 yo M with a history of chronic ETOH abuse w/ DTs with frequent hospital admissions, HLD, alcoholic gastritis/esophagitis/GERD/PUD & hx of hypoxic respiratory failure requiring intubation due to aspiration PNA, presented w/ nausea and vomiting and was admitted for ROSA & lactic acidosis due to dehydration. His hospital course was complicated by MSSA PNA & alcohol withdrawal with DTs requiring admission to the ICU on a Precedex gtt and phenobarb on 3/5. Pt improved and was transferred to medical floor on 3/15.     Aspiration PNA on 3/17 w/ acute hypoxic respiratory failure   - CXR showed new left lower lung field infiltrate  - will get swallow eval   - tapered off oxygen   - c/w Unasyn  - critical care note read and appreciated     Metabolic encephalopathy  - improved  - multifactorial delirium due to aspiration pneumonia, residual medications in system & prolonged hospital stay involving intubation  - c/w Seroquel   - c/w PRN haldol      Hx of Alcohol use w/ withdrawal  - patient is off Precedex and phenobarb  - finished librium taper   - c/w thiamin, folate and MVN    MSSA PNA  - s/p course of Unasyn  - CXR was negative but sputum grew MSSA    Hypokalemia  - replace w/ KCl    HTN  - c/w Metoprolol    HLD  - c/w Lipitor    Prophylaxis:  DVT: Lovenox  GI: PO diet    NOK: Lavelle Whitney, [phone]

## 2021-03-23 DIAGNOSIS — R41.0 DISORIENTATION, UNSPECIFIED: ICD-10-CM

## 2021-03-23 LAB
ANION GAP SERPL CALC-SCNC: 15 MMOL/L — SIGNIFICANT CHANGE UP (ref 5–17)
BUN SERPL-MCNC: 8 MG/DL — SIGNIFICANT CHANGE UP (ref 7–23)
CALCIUM SERPL-MCNC: 8.8 MG/DL — SIGNIFICANT CHANGE UP (ref 8.5–10.1)
CHLORIDE SERPL-SCNC: 103 MMOL/L — SIGNIFICANT CHANGE UP (ref 96–108)
CO2 SERPL-SCNC: 16 MMOL/L — LOW (ref 22–31)
CREAT SERPL-MCNC: 0.87 MG/DL — SIGNIFICANT CHANGE UP (ref 0.5–1.3)
GLUCOSE BLDC GLUCOMTR-MCNC: 114 MG/DL — HIGH (ref 70–99)
GLUCOSE SERPL-MCNC: 51 MG/DL — CRITICAL LOW (ref 70–99)
MAGNESIUM SERPL-MCNC: 1.8 MG/DL — SIGNIFICANT CHANGE UP (ref 1.6–2.6)
PHOSPHATE SERPL-MCNC: 2.5 MG/DL — SIGNIFICANT CHANGE UP (ref 2.5–4.5)
POTASSIUM SERPL-MCNC: 3.6 MMOL/L — SIGNIFICANT CHANGE UP (ref 3.5–5.3)
POTASSIUM SERPL-SCNC: 3.6 MMOL/L — SIGNIFICANT CHANGE UP (ref 3.5–5.3)
SODIUM SERPL-SCNC: 134 MMOL/L — LOW (ref 135–145)

## 2021-03-23 PROCEDURE — 99231 SBSQ HOSP IP/OBS SF/LOW 25: CPT

## 2021-03-23 PROCEDURE — 99232 SBSQ HOSP IP/OBS MODERATE 35: CPT

## 2021-03-23 RX ORDER — AMLODIPINE BESYLATE 2.5 MG/1
5 TABLET ORAL DAILY
Refills: 0 | Status: DISCONTINUED | OUTPATIENT
Start: 2021-03-23 | End: 2021-03-23

## 2021-03-23 RX ORDER — SIMETHICONE 80 MG/1
80 TABLET, CHEWABLE ORAL ONCE
Refills: 0 | Status: COMPLETED | OUTPATIENT
Start: 2021-03-23 | End: 2021-03-23

## 2021-03-23 RX ORDER — AMLODIPINE BESYLATE 2.5 MG/1
5 TABLET ORAL ONCE
Refills: 0 | Status: COMPLETED | OUTPATIENT
Start: 2021-03-23 | End: 2021-03-23

## 2021-03-23 RX ORDER — AMLODIPINE BESYLATE 2.5 MG/1
10 TABLET ORAL DAILY
Refills: 0 | Status: DISCONTINUED | OUTPATIENT
Start: 2021-03-24 | End: 2021-03-24

## 2021-03-23 RX ORDER — IBUPROFEN 200 MG
600 TABLET ORAL ONCE
Refills: 0 | Status: COMPLETED | OUTPATIENT
Start: 2021-03-23 | End: 2021-03-24

## 2021-03-23 RX ADMIN — SIMETHICONE 80 MILLIGRAM(S): 80 TABLET, CHEWABLE ORAL at 13:45

## 2021-03-23 RX ADMIN — Medication 1 MILLIGRAM(S): at 12:21

## 2021-03-23 RX ADMIN — AMLODIPINE BESYLATE 5 MILLIGRAM(S): 2.5 TABLET ORAL at 12:20

## 2021-03-23 RX ADMIN — SODIUM CHLORIDE 75 MILLILITER(S): 9 INJECTION INTRAMUSCULAR; INTRAVENOUS; SUBCUTANEOUS at 05:16

## 2021-03-23 RX ADMIN — AMPICILLIN SODIUM AND SULBACTAM SODIUM 200 GRAM(S): 250; 125 INJECTION, POWDER, FOR SUSPENSION INTRAMUSCULAR; INTRAVENOUS at 05:16

## 2021-03-23 RX ADMIN — Medication 650 MILLIGRAM(S): at 16:54

## 2021-03-23 RX ADMIN — AMLODIPINE BESYLATE 5 MILLIGRAM(S): 2.5 TABLET ORAL at 19:41

## 2021-03-23 RX ADMIN — ENOXAPARIN SODIUM 40 MILLIGRAM(S): 100 INJECTION SUBCUTANEOUS at 12:21

## 2021-03-23 RX ADMIN — ATORVASTATIN CALCIUM 20 MILLIGRAM(S): 80 TABLET, FILM COATED ORAL at 21:05

## 2021-03-23 RX ADMIN — Medication 5 MILLIGRAM(S): at 05:16

## 2021-03-23 RX ADMIN — Medication 100 MILLIGRAM(S): at 12:20

## 2021-03-23 RX ADMIN — Medication 1 TABLET(S): at 12:20

## 2021-03-23 RX ADMIN — Medication 650 MILLIGRAM(S): at 04:47

## 2021-03-23 RX ADMIN — Medication 650 MILLIGRAM(S): at 03:47

## 2021-03-23 RX ADMIN — AMPICILLIN SODIUM AND SULBACTAM SODIUM 200 GRAM(S): 250; 125 INJECTION, POWDER, FOR SUSPENSION INTRAMUSCULAR; INTRAVENOUS at 00:00

## 2021-03-23 NOTE — PROGRESS NOTE BEHAVIORAL HEALTH - NSBHCHARTREVIEWVS_PSY_A_CORE FT
T(C): 36.7 (03-22-21 @ 05:27), Max: 37.4 (03-21-21 @ 16:50)  HR: 66 (03-22-21 @ 05:27) (66 - 87)  BP: 153/84 (03-22-21 @ 05:27) (136/70 - 153/84)  RR: 18 (03-22-21 @ 05:27) (18 - 20)  SpO2: 94% (03-22-21 @ 05:27) (94% - 96%)
T(C): 36.8 (03-19-21 @ 11:16), Max: 37.6 (03-18-21 @ 17:12)  HR: 83 (03-19-21 @ 11:16) (83 - 108)  BP: 152/84 (03-19-21 @ 11:16) (138/78 - 152/84)  RR: 18 (03-19-21 @ 11:16) (18 - 18)  SpO2: 95% (03-19-21 @ 11:16) (94% - 95%)
T(C): 37.4 (03-23-21 @ 11:39), Max: 37.4 (03-23-21 @ 11:39)  HR: 78 (03-23-21 @ 11:39) (59 - 78)  BP: 166/99 (03-23-21 @ 11:39) (127/82 - 172/83)  RR: 18 (03-23-21 @ 11:39) (18 - 20)  SpO2: 97% (03-23-21 @ 11:39) (91% - 97%)

## 2021-03-23 NOTE — SWALLOW BEDSIDE ASSESSMENT ADULT - H & P REVIEW
55 year old male admitted with nausea and vomiting for past few days. Long history of ETOH abuse and DT. Denies ETOH use for past. 24 hour. No CP, fevers, HA, abdominal pain, melena, BRBPR or hematuria. On arrival had ROSA from dehydration./yes

## 2021-03-23 NOTE — PROGRESS NOTE BEHAVIORAL HEALTH - RISK ASSESSMENT
Chronic risk factors: single, male gender, continuous/chronic heavy alcohol abuse with limited insight and lack of motivation to address it. Protective factors: young; no formal psych hx; no known hx of suicide attempts or self-injurious behavior; no hx of aggression/violence; stable domicile, engaged with his job; denies illicit drug use; denies access to guns; access to health services. No acute risk factors identified.

## 2021-03-23 NOTE — SWALLOW BEDSIDE ASSESSMENT ADULT - SLP GENERAL OBSERVATIONS
Pt seen bedside alert and oriented x3-4. Pt answered simple questions for assessment and followed 1-step directions for oral [facility] exam. Speech intelligibility good for conversational speech. Pt seen bedside alert and oriented x3-4. Pt answered simple questions for assessment, he verbalized wants and followed 1-step directions for oral [facility] exam. Speech intelligibility good for conversational speech.

## 2021-03-23 NOTE — PROGRESS NOTE ADULT - NUTRITIONAL ASSESSMENT
This patient has been assessed with a concern for Malnutrition and has been determined to have a diagnosis/diagnoses of Moderate protein-calorie malnutrition.    This patient is being managed with:   Diet NPO-  Except Medications  Entered: Mar 17 2021  3:44PM    
This patient has been assessed with a concern for Malnutrition and has been determined to have a diagnosis/diagnoses of Moderate protein-calorie malnutrition.    This patient is being managed with:   Diet DASH/TLC-  Sodium & Cholesterol Restricted  Entered: Mar 23 2021  9:11AM    
This patient has been assessed with a concern for Malnutrition and has been determined to have a diagnosis/diagnoses of Moderate protein-calorie malnutrition.    This patient is being managed with:   Diet NPO-  Except Medications  Entered: Mar 17 2021  3:44PM    
This patient has been assessed with a concern for Malnutrition and has been determined to have a diagnosis/diagnoses of Moderate protein-calorie malnutrition.    This patient is being managed with:   Diet NPO-  Except Medications  Entered: Mar 17 2021  3:44PM      This patient has been assessed with a concern for Malnutrition and has been determined to have a diagnosis/diagnoses of Moderate protein-calorie malnutrition.    This patient is being managed with:   Diet NPO-  Except Medications  Entered: Mar 17 2021  3:44PM    
This patient has been assessed with a concern for Malnutrition and has been determined to have a diagnosis/diagnoses of Moderate protein-calorie malnutrition.    This patient is being managed with:   Diet Soft-  Low Sodium  Supplement Feeding Modality:  Oral  Ensure Pudding Cans or Servings Per Day:  3       Frequency:  Daily  Entered: Mar 12 2021 11:20AM    
This patient has been assessed with a concern for Malnutrition and has been determined to have a diagnosis/diagnoses of Moderate protein-calorie malnutrition.    This patient is being managed with:   Diet Soft-  Low Sodium  Supplement Feeding Modality:  Oral  Ensure Pudding Cans or Servings Per Day:  3       Frequency:  Daily  Entered: Mar 12 2021 11:20AM

## 2021-03-23 NOTE — PROGRESS NOTE ADULT - PROVIDER SPECIALTY LIST ADULT
Hospitalist
Critical Care
Hospitalist
Hospitalist
Critical Care
Hospitalist
Critical Care
Hospitalist

## 2021-03-23 NOTE — PROGRESS NOTE BEHAVIORAL HEALTH - OTHER
has reactivity improved deferred at this time "ok" low end of fair - improved slightly slowed getting more linear

## 2021-03-23 NOTE — PROGRESS NOTE ADULT - REASON FOR ADMISSION
ETOH withdrawal and ROSA from dehydration.

## 2021-03-23 NOTE — PROGRESS NOTE BEHAVIORAL HEALTH - NSBHCONSULTMEDS_PSY_A_CORE FT
Patient has been at [facility] for 15 days; not in alcohol withdrawal anymore. Librium thus being tapered off with perimeter to :"hold" for sedation  3/18/21: discontinued Librium today as he has not gotten it in > 36 hrs given level of sedation.  3/19/21: haldol 3mg IVP with Ativan 2mg IVP q6hrs PRN for agitation; elevate head; redirect, reorient, hydrate; medically optimize  DC Ativan  3/22/21: MSE improving; less need for PRNs. WASH OUT needed - limit any PRN use to severe agitation only as Pt gets easily confused on PRN medications and prolongs his AMS
Patient has been at VS for 15 days; not in alcohol withdrawal anymore. Librium thus being tapered off with perimeter to :"hold" for sedation  3/18/21: discontinued Librium today as he has not gotten it in > 36 hrs given level of sedation.  3/19/21: haldol 3mg IVP with Ativan 2mg IVP q6hrs PRN for agitation; elevate head; redirect, reorient, hydrate; medically optimize
Patient has been at VS for 15 days; not in alcohol withdrawal anymore. Librium thus being tapered off with perimeter to :"hold" for sedation  3/18/21: discontinued Librium today as he has not gotten it in > 36 hrs given level of sedation.  3/19/21: haldol 3mg IVP with Ativan 2mg IVP q6hrs PRN for agitation; elevate head; redirect, reorient, hydrate; medically optimize  DC Ativan  3/22/21: MSE improving; less need for PRNs. WASH OUT needed - limit any PRN use to severe agitation only as Pt gets easily confused on PRN medications and prolongs his AMS  3/23/21: nearing baseline, no PRNs in > 36 hrs; discontinue HS Seroquel as he does not need it on discharge

## 2021-03-23 NOTE — PROGRESS NOTE BEHAVIORAL HEALTH - SUMMARY
- improving acute hyperactive multifactorial delirium at this time secondary to aspiration pneumonia, residual medications in system et al   - limit PRN use and have a "wash out"   - if improvement continues on this trajectory, may be able to go home tomorrow
- acute hypersctive multifactorial delirium at this time secondary to aspiration pneumonia, residual medications in system et al   - no capacity to make his medical decisions
- improved enough initial acute hyperactive multifactorial delirium in that Patient is nearing his baseline and is able to be discharged home where he can recuperate   - Patient would be best served in addressing his chronic alcohol abuse issue. However, he has chronic poor motivation to do so and continues to minimize the extent of his use and refuse substance abuse services.

## 2021-03-23 NOTE — PROGRESS NOTE ADULT - SUBJECTIVE AND OBJECTIVE BOX
56 yo M with a history of chronic ETOH abuse w/ DTs with frequent hospital admissions, HLD, alcoholic gastritis/esophagitis/GERD/PUD & hx of hypoxic respiratory failure requiring intubation due to aspiration PNA, presented w/ nausea and vomiting and was admitted for ROSA & lactic acidosis due to dehydration. His hospital course was complicated by MSSA PNA & alcohol withdrawal with DTs requiring admission to the ICU on a Precedex gtt and phenobarb on 3/5. Pt improved and was transferred to medical floor on 3/15. He is lying in bed in NAD, confusion improving.      MEDICATIONS  (STANDING):  amLODIPine   Tablet 5 milliGRAM(s) Oral daily  atorvastatin 20 milliGRAM(s) Oral at bedtime  enoxaparin Injectable 40 milliGRAM(s) SubCutaneous daily  folic acid Injectable 1 milliGRAM(s) IV Push daily  multivitamin 1 Tablet(s) Oral daily  thiamine Injectable 100 milliGRAM(s) IV Push daily    MEDICATIONS  (PRN):  acetaminophen   Tablet .. 650 milliGRAM(s) Oral every 6 hours PRN Mild Pain (1 - 3)  guaiFENesin   Syrup  (Sugar-Free) 200 milliGRAM(s) Oral every 6 hours PRN Cough  haloperidol    Injectable 5 milliGRAM(s) IntraMuscular every 4 hours PRN Agitation  ondansetron Injectable 4 milliGRAM(s) IV Push every 6 hours PRN Nausea and/or Vomiting      Allergies    No Known Allergies    Intolerances        Vital Signs Last 24 Hrs  T(C): 36.7 (23 Mar 2021 17:00), Max: 37.4 (23 Mar 2021 11:39)  T(F): 98 (23 Mar 2021 17:00), Max: 99.4 (23 Mar 2021 11:39)  HR: 74 (23 Mar 2021 17:00) (60 - 78)  BP: 169/84 (23 Mar 2021 18:05) (127/82 - 172/83)  BP(mean): --  RR: 20 (23 Mar 2021 17:00) (18 - 20)  SpO2: 97% (23 Mar 2021 17:00) (96% - 97%)    PHYSICAL EXAM:  GENERAL: NAD, well-groomed, well-developed  HEAD:  Atraumatic, Normocephalic  EYES: EOMI, PERRLA,   NECK: Supple   NERVOUS SYSTEM: more alert and awake  CHEST/LUNG: Clear to auscultation bilaterally; No rales, rhonchi, wheezing, or rubs  HEART: Regular rate and rhythm; No murmurs, rubs, or gallops  ABDOMEN: Soft, Nontender, Nondistended; Bowel sounds present  EXTREMITIES: No clubbing, cyanosis, or edema    LABS:                        10.9   7.04  )-----------( 681      ( 22 Mar 2021 09:58 )             33.7     03-23    134<L>  |  103  |  8   ----------------------------<  51<LL>  3.6   |  16<L>  |  0.87    Ca    8.8      23 Mar 2021 08:29  Phos  2.5     03-23  Mg     1.8     03-23          CAPILLARY BLOOD GLUCOSE      POCT Blood Glucose.: 114 mg/dL (23 Mar 2021 09:48)      RADIOLOGY & ADDITIONAL TESTS:    03-22-21 @ 07:01  -  03-23-21 @ 07:00  --------------------------------------------------------  IN:    IV PiggyBack: 200 mL    IV PiggyBack: 300 mL    sodium chloride 0.9%: 900 mL  Total IN: 1400 mL    OUT:    Oral Fluid: 0 mL  Total OUT: 0 mL    Total NET: 1400 mL

## 2021-03-23 NOTE — PROGRESS NOTE BEHAVIORAL HEALTH - ORIENTATION OTHER
needs cuing which is expected
does not answer MMSE questions, + responds to his name
needs cuing which is expected

## 2021-03-23 NOTE — PROGRESS NOTE ADULT - ASSESSMENT
56 yo M with a history of chronic ETOH abuse w/ DTs with frequent hospital admissions, HLD, alcoholic gastritis/esophagitis/GERD/PUD & hx of hypoxic respiratory failure requiring intubation due to aspiration PNA, presented w/ nausea and vomiting and was admitted for ROSA & lactic acidosis due to dehydration. His hospital course was complicated by MSSA PNA & alcohol withdrawal with DTs requiring admission to the ICU on a Precedex gtt and phenobarb on 3/5. Pt improved and was transferred to medical floor on 3/15.     Aspiration PNA on 3/17 w/ acute hypoxic respiratory failure   - CXR showed new left lower lung field infiltrate  - will get swallow eval   - tapered off oxygen   - c/w Unasyn  - critical care note read and appreciated     Metabolic encephalopathy  - improving  - multifactorial delirium due to aspiration pneumonia, residual medications in system & prolonged hospital stay involving intubation  - stop Seroquel as per psych  - c/w PRN haldol      Hx of Alcohol use w/ withdrawal  - patient is off Precedex and phenobarb  - finished librium taper   - c/w thiamin, folate and MVN    MSSA PNA  - s/p course of Unasyn  - CXR was negative but sputum grew MSSA     HTN  - c/w Metoprolol    HLD  - c/w Lipitor    Prophylaxis:  DVT: Lovenox  GI: PO diet    NOK: SonLavelle, [phone]     Will discharge pending PT eval.  56 yo M with a history of chronic ETOH abuse w/ DTs with frequent hospital admissions, HLD, alcoholic gastritis/esophagitis/GERD/PUD & hx of hypoxic respiratory failure requiring intubation due to aspiration PNA, presented w/ nausea and vomiting and was admitted for ROSA & lactic acidosis due to dehydration. His hospital course was complicated by MSSA PNA & alcohol withdrawal with DTs requiring admission to the ICU on a Precedex gtt and phenobarb on 3/5. Pt improved and was transferred to medical floor on 3/15, where he had significant  aspiration on 3/17 that worsened his Metabolic encephalopathy.    Metabolic encephalopathy  - improving, almost resolved   - multifactorial delirium due to aspiration pneumonia, residual medications in system & prolonged hospital stay involving intubation  - stop Seroquel as per psych  - c/w PRN haldol      Aspiration PNA on 3/17 w/ acute hypoxic respiratory failure   - CXR showed new left lower lung field infiltrate  - will get swallow eval   - tapered off oxygen   - [place] Unasyn  - critical care note read and appreciated     Hx of Alcohol use w/ withdrawal  - patient is off Precedex and phenobarb  - finished librium taper   - c/w thiamin, folate and MVN    MSSA PNA  - s/p course of Unasyn  - CXR was negative but sputum grew MSSA      HTN  - increase Norvasc to 10mg    HLD  - c/w Lipitor    Prophylaxis:  DVT: Lovenox  GI: PO diet    NOK: Lavelle Whitney, [phone]     Will discharge pending PT eval.

## 2021-03-23 NOTE — PROGRESS NOTE BEHAVIORAL HEALTH - NSBHFUPINTERVALHXFT_PSY_A_CORE
Patient needed only 1 dose of IM PRN haldol today. He is more organized and linear then when last seen indicating improved MSE. Patient was looking for his cell phone - his son brought his cell and a  and he can only find the  at this time. He wants to go home and feels better overall. Still wobbly and on fall risk protocol. Denies suicidality, names protective factors and continues to minimize his alcohol abuse problem and express no interest in referrals to rehab, counseling, AA etc
Patient continues to have intermittent psychomotor agitation, restlessness including trying to climb out of bed consistent with hyperactive delirium which Patient has a LONG history of during hospitalizations.  EXAM: confused; not able to be interviewed. received several PRNs since last seen.
Patient has not needed any haldol PRNs for agitation in > 36 hrs. He passed Swallow test and is on a regular diet. Patient continues to maintain clinical progress and his mental status exam has improved. He again wants to go home and feels better overall. Denies mood/psychotic sxs; denies suicidality, names protective factors and continues to minimize his alcohol abuse problem and express no interest in referrals to rehab, counseling, AA etc

## 2021-03-23 NOTE — SWALLOW BEDSIDE ASSESSMENT ADULT - SWALLOW EVAL: DIAGNOSIS
Oropharyngeal phases of swallow within normal limits. Noted inconstant throat clear with thin liquid, no other overt signs of aspiration with consistencies trialed. Oropharyngeal phases of swallow within normal limits except inconstant throat clear with thin liquid. Suspect functional. Oropharyngeal phases of swallow within normal limits except inconsistent throat clear with thin liquid, suspect functional.

## 2021-03-23 NOTE — SWALLOW BEDSIDE ASSESSMENT ADULT - PHARYNGEAL PHASE
Within functional limits Throat clear post oral intake 1/4 trials, ?intermittent/Throat clear post oral intake throat clear 1/4 trials

## 2021-03-23 NOTE — PROGRESS NOTE BEHAVIORAL HEALTH - BODY HABITUS
Patient contacted and informed that her prescriptions request were sent to Dr. Mcgraw.  
Average build
Average build

## 2021-03-23 NOTE — PROGRESS NOTE BEHAVIORAL HEALTH - NSBHCHARTREVIEWLAB_PSY_A_CORE FT
03-22    138  |  106  |  11  ----------------------------<  57<L>  3.3<L>   |  21<L>  |  0.96    Ca    8.8      22 Mar 2021 09:58  Phos  2.7     03-22  Mg     1.8     03-22
03-18    140  |  109<H>  |  17  ----------------------------<  91  4.1   |  23  |  1.14    Ca    9.2      18 Mar 2021 10:22  Phos  2.9     03-18  Mg     2.3     03-18
03-23    134<L>  |  103  |  8   ----------------------------<  51<LL>  3.6   |  16<L>  |  0.87    Ca    8.8      23 Mar 2021 08:29  Phos  2.5     03-23  Mg     1.8     03-23

## 2021-03-23 NOTE — PHARMACOTHERAPY INTERVENTION NOTE - COMMENTS
Recommended d/cing ampicillin/sulbactam; aspiration event --> pneumonitis; treatment from 3/17-3/23
Recommended thiamine IV to PO switch- patient tolerating PO meds.

## 2021-03-23 NOTE — PROGRESS NOTE BEHAVIORAL HEALTH - NSBHCONSULTFOLLOWAFTERCARE_PSY_A_CORE FT
once he is able to ambulate independently and MSE returns to baseline, psych cleared for discharge home. Declined alcohol abuse referrals which he always does
psych cleared for discharge. Declined referrals to substance abuse services (chronic)

## 2021-03-24 ENCOUNTER — TRANSCRIPTION ENCOUNTER (OUTPATIENT)
Age: 56
End: 2021-03-24

## 2021-03-24 VITALS
OXYGEN SATURATION: 97 % | TEMPERATURE: 98 F | DIASTOLIC BLOOD PRESSURE: 80 MMHG | RESPIRATION RATE: 20 BRPM | HEART RATE: 94 BPM | SYSTOLIC BLOOD PRESSURE: 143 MMHG

## 2021-03-24 LAB
ANION GAP SERPL CALC-SCNC: 11 MMOL/L — SIGNIFICANT CHANGE UP (ref 5–17)
BUN SERPL-MCNC: 8 MG/DL — SIGNIFICANT CHANGE UP (ref 7–23)
CALCIUM SERPL-MCNC: 8.8 MG/DL — SIGNIFICANT CHANGE UP (ref 8.5–10.1)
CHLORIDE SERPL-SCNC: 107 MMOL/L — SIGNIFICANT CHANGE UP (ref 96–108)
CO2 SERPL-SCNC: 20 MMOL/L — LOW (ref 22–31)
CREAT SERPL-MCNC: 0.98 MG/DL — SIGNIFICANT CHANGE UP (ref 0.5–1.3)
GLUCOSE SERPL-MCNC: 99 MG/DL — SIGNIFICANT CHANGE UP (ref 70–99)
HCT VFR BLD CALC: 34.9 % — LOW (ref 39–50)
HGB BLD-MCNC: 10.8 G/DL — LOW (ref 13–17)
MAGNESIUM SERPL-MCNC: 1.9 MG/DL — SIGNIFICANT CHANGE UP (ref 1.6–2.6)
MCHC RBC-ENTMCNC: 24.8 PG — LOW (ref 27–34)
MCHC RBC-ENTMCNC: 30.9 GM/DL — LOW (ref 32–36)
MCV RBC AUTO: 80.2 FL — SIGNIFICANT CHANGE UP (ref 80–100)
NRBC # BLD: 0 /100 WBCS — SIGNIFICANT CHANGE UP (ref 0–0)
PHOSPHATE SERPL-MCNC: 2.4 MG/DL — LOW (ref 2.5–4.5)
PLATELET # BLD AUTO: 691 K/UL — HIGH (ref 150–400)
POTASSIUM SERPL-MCNC: 3.1 MMOL/L — LOW (ref 3.5–5.3)
POTASSIUM SERPL-SCNC: 3.1 MMOL/L — LOW (ref 3.5–5.3)
RBC # BLD: 4.35 M/UL — SIGNIFICANT CHANGE UP (ref 4.2–5.8)
RBC # FLD: 16.6 % — HIGH (ref 10.3–14.5)
SODIUM SERPL-SCNC: 138 MMOL/L — SIGNIFICANT CHANGE UP (ref 135–145)
WBC # BLD: 3.57 K/UL — LOW (ref 3.8–10.5)
WBC # FLD AUTO: 3.57 K/UL — LOW (ref 3.8–10.5)

## 2021-03-24 PROCEDURE — 99239 HOSP IP/OBS DSCHRG MGMT >30: CPT

## 2021-03-24 RX ORDER — POTASSIUM CHLORIDE 20 MEQ
1 PACKET (EA) ORAL
Qty: 3 | Refills: 0
Start: 2021-03-24 | End: 2021-03-26

## 2021-03-24 RX ORDER — AMLODIPINE BESYLATE 2.5 MG/1
1 TABLET ORAL
Qty: 30 | Refills: 0
Start: 2021-03-24 | End: 2021-04-22

## 2021-03-24 RX ORDER — SODIUM,POTASSIUM PHOSPHATES 278-250MG
1 POWDER IN PACKET (EA) ORAL ONCE
Refills: 0 | Status: COMPLETED | OUTPATIENT
Start: 2021-03-24 | End: 2021-03-24

## 2021-03-24 RX ORDER — ATORVASTATIN CALCIUM 80 MG/1
1 TABLET, FILM COATED ORAL
Qty: 0 | Refills: 0 | DISCHARGE
Start: 2021-03-24

## 2021-03-24 RX ORDER — ERGOCALCIFEROL 1.25 MG/1
1 CAPSULE ORAL
Qty: 0 | Refills: 0 | DISCHARGE

## 2021-03-24 RX ORDER — POTASSIUM CHLORIDE 20 MEQ
40 PACKET (EA) ORAL EVERY 4 HOURS
Refills: 0 | Status: COMPLETED | OUTPATIENT
Start: 2021-03-24 | End: 2021-03-24

## 2021-03-24 RX ADMIN — Medication 600 MILLIGRAM(S): at 06:41

## 2021-03-24 RX ADMIN — Medication 1 PACKET(S): at 13:25

## 2021-03-24 RX ADMIN — Medication 40 MILLIEQUIVALENT(S): at 11:19

## 2021-03-24 RX ADMIN — Medication 40 MILLIEQUIVALENT(S): at 13:25

## 2021-03-24 RX ADMIN — Medication 600 MILLIGRAM(S): at 05:33

## 2021-03-24 RX ADMIN — AMLODIPINE BESYLATE 10 MILLIGRAM(S): 2.5 TABLET ORAL at 05:35

## 2021-03-24 NOTE — DISCHARGE NOTE PROVIDER - NSDCMRMEDTOKEN_GEN_ALL_CORE_FT
aluminum hydroxide-magnesium hydroxide 200 mg-200 mg/5 mL oral suspension: 30 milliliter(s) orally every 8 hours, As needed, Dyspepsia  amLODIPine 10 mg oral tablet: 1 tab(s) orally once a day  Aspirin Low Dose 81 mg oral delayed release tablet: 1 tab(s) orally once a day   atorvastatin 20 mg oral tablet: 1 tab(s) orally once a day (at bedtime)  folic acid 1 mg oral tablet: 1 tab(s) orally once a day  magnesium oxide 500 mg oral tablet: 1 tab(s) orally once a day  Multiple Vitamins oral tablet: 1 tab(s) orally once a day  Protonix 40 mg oral delayed release tablet: 1 tab(s) orally once a day  thiamine 100 mg oral tablet: 1 tab(s) orally once a day   aluminum hydroxide-magnesium hydroxide 200 mg-200 mg/5 mL oral suspension: 30 milliliter(s) orally every 8 hours, As needed, Dyspepsia  amLODIPine 10 mg oral tablet: 1 tab(s) orally once a day  Aspirin Low Dose 81 mg oral delayed release tablet: 1 tab(s) orally once a day   atorvastatin 20 mg oral tablet: 1 tab(s) orally once a day (at bedtime)  folic acid 1 mg oral tablet: 1 tab(s) orally once a day  K-Tab 20 mEq oral tablet, extended release: 1 tab(s) orally once a day   magnesium oxide 500 mg oral tablet: 1 tab(s) orally once a day  Multiple Vitamins oral tablet: 1 tab(s) orally once a day  Protonix 40 mg oral delayed release tablet: 1 tab(s) orally once a day  thiamine 100 mg oral tablet: 1 tab(s) orally once a day

## 2021-03-24 NOTE — DISCHARGE NOTE PROVIDER - NSCORESITESY/N_GEN_A_CORE_RD
----- Message from Cecil Hernandez MD sent at 6/9/2018  9:11 AM CDT -----  Please notify patient of normal results    
Component      Latest Ref Rng & Units 6/8/2018   PROTIME      9.7 - 11.8 sec 10.1   INR       1.0   PTT      22 - 30 sec 28   PROLACTIN      2.8 - 29.2 ng/mL 16.5     Contacted and conveyed MD message to patient; verbalized understanding.    
Yes

## 2021-03-24 NOTE — DISCHARGE NOTE PROVIDER - NSDCCPCAREPLAN_GEN_ALL_CORE_FT
PRINCIPAL DISCHARGE DIAGNOSIS  Diagnosis: Delirium due to multiple etiologies, acute, hyperactive  Assessment and Plan of Treatment:       SECONDARY DISCHARGE DIAGNOSES  Diagnosis: MSSA (methicillin susceptible Staphylococcus aureus) pneumonia  Assessment and Plan of Treatment:     Diagnosis: Benign essential HTN  Assessment and Plan of Treatment:     Diagnosis: HLD (hyperlipidemia)  Assessment and Plan of Treatment:     Diagnosis: Alcohol use disorder, severe, dependence  Assessment and Plan of Treatment:     Diagnosis: Abdominal pain  Assessment and Plan of Treatment:      PRINCIPAL DISCHARGE DIAGNOSIS  Diagnosis: Delirium due to multiple etiologies, acute, hyperactive  Assessment and Plan of Treatment:       SECONDARY DISCHARGE DIAGNOSES  Diagnosis: Hypophosphatemia  Assessment and Plan of Treatment:     Diagnosis: Hypomagnesemia  Assessment and Plan of Treatment:     Diagnosis: Hypokalemia  Assessment and Plan of Treatment:     Diagnosis: MSSA (methicillin susceptible Staphylococcus aureus) pneumonia  Assessment and Plan of Treatment:     Diagnosis: Benign essential HTN  Assessment and Plan of Treatment:     Diagnosis: HLD (hyperlipidemia)  Assessment and Plan of Treatment:     Diagnosis: Alcohol use disorder, severe, dependence  Assessment and Plan of Treatment:     Diagnosis: Abdominal pain  Assessment and Plan of Treatment:

## 2021-03-24 NOTE — DISCHARGE NOTE NURSING/CASE MANAGEMENT/SOCIAL WORK - NSDCPNINST_GEN_ALL_CORE
Pt verbalized understanding of discharge instructions, medications, follow up appt with PMD, s/s to return to ER. Pt instructed on Covid Vaccine/declined.

## 2021-03-24 NOTE — DISCHARGE NOTE PROVIDER - DETAILS OF MALNUTRITION DIAGNOSIS/DIAGNOSES
This patient has been assessed with a concern for Malnutrition and was treated during this hospitalization for the following Nutrition diagnosis/diagnoses:     -  03/12/2021: Moderate protein-calorie malnutrition   This patient has been assessed with a concern for Malnutrition and was treated during this hospitalization for the following Nutrition diagnosis/diagnoses:     -  03/12/2021: Moderate protein-calorie malnutrition    This patient has been assessed with a concern for Malnutrition and was treated during this hospitalization for the following Nutrition diagnosis/diagnoses:     -  03/12/2021: Moderate protein-calorie malnutrition

## 2021-03-24 NOTE — CHART NOTE - NSCHARTNOTEFT_GEN_A_CORE
Midline catheters removed from left upper extremity and right upper extremity. Patient was explained about procedure and tolerated procedure well. Midline catheters removed with no purulence or drainage noted on catheter tips. No blood or pus noted at sites. Pressure held at both sites for >3 minutes, no bleeding noted. Occlusive dressing placed at site.

## 2021-03-24 NOTE — DISCHARGE NOTE NURSING/CASE MANAGEMENT/SOCIAL WORK - PATIENT PORTAL LINK FT
You can access the FollowMyHealth Patient Portal offered by [facility] by registering at the following website: [email or web address]. By joining [facility]’s FollowMyHealth portal, you will also be able to view your health information using other applications (apps) compatible with our system.

## 2021-03-24 NOTE — DISCHARGE NOTE PROVIDER - HOSPITAL COURSE
56 yo M with a history of chronic ETOH abuse w/ DTs with frequent hospital admissions, HLD, alcoholic gastritis/esophagitis/GERD/PUD & hx of hypoxic respiratory failure requiring intubation due to aspiration PNA, presented w/ nausea and vomiting and was admitted for ROSA & lactic acidosis due to dehydration. His hospital course was complicated by MSSA PNA & alcohol withdrawal with DTs requiring admission to the ICU on a Precedex gtt and phenobarb on 3/5. Pt improved and was transferred to medical floor on 3/15, where he had significant  aspiration on 3/17 that worsened his Metabolic encephalopathy.    Metabolic encephalopathy  - improving, almost resolved   - multifactorial delirium due to aspiration pneumonia, residual medications in system & prolonged hospital stay involving intubation  - stop Seroquel as per psych  - c/w PRN haldol      Aspiration PNA on 3/17 w/ acute hypoxic respiratory failure   - CXR showed new left lower lung field infiltrate  - tapered off oxygen   - finished Unasyn  - critical care note read and appreciated     Hx of Alcohol use w/ withdrawal  - patient is off Precedex and phenobarb  - finished librium taper   - c/w thiamin, folate and MVN    MSSA PNA  - s/p course of Unasyn  - CXR was negative but sputum grew MSSA      HTN  - Norvasc    HLD  - c/w Lipitor    stable for d/c pending PT eval 54 yo M with a history of chronic ETOH abuse w/ DTs with frequent hospital admissions, HLD, alcoholic gastritis/esophagitis/GERD/PUD & hx of hypoxic respiratory failure requiring intubation due to aspiration PNA, presented w/ nausea and vomiting and was admitted for ROSA & lactic acidosis due to dehydration. His hospital course was complicated by MSSA PNA & alcohol withdrawal with DTs requiring admission to the ICU on a Precedex gtt and phenobarb on 3/5. Pt improved and was transferred to medical floor on 3/15, where he had significant  aspiration on 3/17 that worsened his Metabolic encephalopathy.    Metabolic encephalopathy  - improving, almost resolved   - multifactorial delirium due to aspiration pneumonia, residual medications in system & prolonged hospital stay involving intubation  - stop Seroquel as per psych  - c/w PRN haldol      Aspiration PNA on 3/17 w/ acute hypoxic respiratory failure   - CXR showed new left lower lung field infiltrate  - tapered off oxygen   - finished Unasyn  - critical care note read and appreciated     Hx of Alcohol use w/ withdrawal  - patient is off Precedex and phenobarb  - finished librium taper   - c/w thiamin, folate and MVN    MSSA PNA  - s/p course of Unasyn  - CXR was negative but sputum grew MSSA      Hypokalemia, hypomagnesemia, hypophosphatemia  -lyte replaced, f/up with pcp    HTN  - Norvasc    HLD  - c/w Lipitor    stable for d/c pending PT eval

## 2021-03-29 DIAGNOSIS — F10.239 ALCOHOL DEPENDENCE WITH WITHDRAWAL, UNSPECIFIED: ICD-10-CM

## 2021-03-29 DIAGNOSIS — I95.9 HYPOTENSION, UNSPECIFIED: ICD-10-CM

## 2021-03-29 DIAGNOSIS — K21.9 GASTRO-ESOPHAGEAL REFLUX DISEASE WITHOUT ESOPHAGITIS: ICD-10-CM

## 2021-03-29 DIAGNOSIS — E83.39 OTHER DISORDERS OF PHOSPHORUS METABOLISM: ICD-10-CM

## 2021-03-29 DIAGNOSIS — I10 ESSENTIAL (PRIMARY) HYPERTENSION: ICD-10-CM

## 2021-03-29 DIAGNOSIS — Z78.1 PHYSICAL RESTRAINT STATUS: ICD-10-CM

## 2021-03-29 DIAGNOSIS — E87.2 ACIDOSIS: ICD-10-CM

## 2021-03-29 DIAGNOSIS — E44.0 MODERATE PROTEIN-CALORIE MALNUTRITION: ICD-10-CM

## 2021-03-29 DIAGNOSIS — R45.1 RESTLESSNESS AND AGITATION: ICD-10-CM

## 2021-03-29 DIAGNOSIS — R71.0 PRECIPITOUS DROP IN HEMATOCRIT: ICD-10-CM

## 2021-03-29 DIAGNOSIS — J96.01 ACUTE RESPIRATORY FAILURE WITH HYPOXIA: ICD-10-CM

## 2021-03-29 DIAGNOSIS — J69.0 PNEUMONITIS DUE TO INHALATION OF FOOD AND VOMIT: ICD-10-CM

## 2021-03-29 DIAGNOSIS — F05 DELIRIUM DUE TO KNOWN PHYSIOLOGICAL CONDITION: ICD-10-CM

## 2021-03-29 DIAGNOSIS — E87.6 HYPOKALEMIA: ICD-10-CM

## 2021-03-29 DIAGNOSIS — E78.2 MIXED HYPERLIPIDEMIA: ICD-10-CM

## 2021-03-29 DIAGNOSIS — E83.42 HYPOMAGNESEMIA: ICD-10-CM

## 2021-03-29 DIAGNOSIS — Z86.16 PERSONAL HISTORY OF COVID-19: ICD-10-CM

## 2021-03-29 DIAGNOSIS — Z79.82 LONG TERM (CURRENT) USE OF ASPIRIN: ICD-10-CM

## 2021-03-29 DIAGNOSIS — B95.61 METHICILLIN SUSCEPTIBLE STAPHYLOCOCCUS AUREUS INFECTION AS THE CAUSE OF DISEASES CLASSIFIED ELSEWHERE: ICD-10-CM

## 2021-03-29 DIAGNOSIS — G93.41 METABOLIC ENCEPHALOPATHY: ICD-10-CM

## 2021-03-29 DIAGNOSIS — E86.0 DEHYDRATION: ICD-10-CM

## 2021-03-29 DIAGNOSIS — R33.9 RETENTION OF URINE, UNSPECIFIED: ICD-10-CM

## 2021-03-29 DIAGNOSIS — F10.231 ALCOHOL DEPENDENCE WITH WITHDRAWAL DELIRIUM: ICD-10-CM

## 2021-03-29 DIAGNOSIS — N17.9 ACUTE KIDNEY FAILURE, UNSPECIFIED: ICD-10-CM

## 2021-03-29 DIAGNOSIS — K27.9 PEPTIC ULCER, SITE UNSPECIFIED, UNSPECIFIED AS ACUTE OR CHRONIC, WITHOUT HEMORRHAGE OR PERFORATION: ICD-10-CM

## 2021-03-29 NOTE — PROGRESS NOTE BEHAVIORAL HEALTH - NSBHFUPINTERVALHXFT_PSY_A_CORE
Patient is back to his baseline and his mental status exam has much improved. He is calm, cooperative, polite, asks for a flu shot and cream for a buttock rash. He minimizes his drinking after he was reminded to start decreased his use as he has a fatty liver already and GI issues - he states that he had "no problem since 1993" (ie. no medical issues stemming from drinking in his younger years). Substance abuse education and impact on health provided. Patient strongly encouraged to enlist in substance abuse services. Patient at this time endorses stable euthymic mood, regular sleep / appetite / energy level / concentration / bathroom habits. Denies any symptoms of hypomania/alma/psychosis/major depression/ anxiety/panic. Denies any active or passive suicidal or homicidal ideation. Names protective factors (uma; family; hope for future, his work). Denies access to guns and denies illicit drug use.
Patient more alert, awake since IVP phenobarb was discontinued. Primary team put him on Ativan 2mg IVP PRN which he has been receiving - last dose 9am today. Patient has been at [facility] for > 1 week now and is past acute alcohol withdrawal phase; moreover, he was already effectively treated for it during the first several days. He needs to "clear out" of all substance at this time to have his MSE return to baseline so he can be discharged.
Patient received 8 doses of IVP phenobarb overnight for 'agitation" which was likely need-based such as wanting to get out of bed etc. and should not be counted in a CIWA score. Vitals are at baseline and not indicating significant alcohol withdrawal symptoms.
General

## 2021-03-31 ENCOUNTER — INPATIENT (INPATIENT)
Facility: HOSPITAL | Age: 56
LOS: 4 days | Discharge: HOME | End: 2021-04-05
Attending: INTERNAL MEDICINE | Admitting: INTERNAL MEDICINE
Payer: MEDICAID

## 2021-03-31 VITALS
WEIGHT: 179.9 LBS | RESPIRATION RATE: 20 BRPM | DIASTOLIC BLOOD PRESSURE: 96 MMHG | TEMPERATURE: 98 F | HEART RATE: 124 BPM | HEIGHT: 67 IN | SYSTOLIC BLOOD PRESSURE: 143 MMHG | OXYGEN SATURATION: 98 %

## 2021-03-31 LAB
BASOPHILS # BLD AUTO: 0.08 K/UL — SIGNIFICANT CHANGE UP (ref 0–0.2)
BASOPHILS NFR BLD AUTO: 1.1 % — SIGNIFICANT CHANGE UP (ref 0–2)
EOSINOPHIL # BLD AUTO: 0.06 K/UL — SIGNIFICANT CHANGE UP (ref 0–0.5)
EOSINOPHIL NFR BLD AUTO: 0.8 % — SIGNIFICANT CHANGE UP (ref 0–6)
GLUCOSE BLDC GLUCOMTR-MCNC: 85 MG/DL — SIGNIFICANT CHANGE UP (ref 70–99)
HCT VFR BLD CALC: 39.5 % — SIGNIFICANT CHANGE UP (ref 39–50)
HGB BLD-MCNC: 12.7 G/DL — LOW (ref 13–17)
IMM GRANULOCYTES NFR BLD AUTO: 0.3 % — SIGNIFICANT CHANGE UP (ref 0–1.5)
LYMPHOCYTES # BLD AUTO: 2.72 K/UL — SIGNIFICANT CHANGE UP (ref 1–3.3)
LYMPHOCYTES # BLD AUTO: 37.9 % — SIGNIFICANT CHANGE UP (ref 13–44)
MCHC RBC-ENTMCNC: 25.2 PG — LOW (ref 27–34)
MCHC RBC-ENTMCNC: 32.2 GM/DL — SIGNIFICANT CHANGE UP (ref 32–36)
MCV RBC AUTO: 78.4 FL — LOW (ref 80–100)
MONOCYTES # BLD AUTO: 0.27 K/UL — SIGNIFICANT CHANGE UP (ref 0–0.9)
MONOCYTES NFR BLD AUTO: 3.8 % — SIGNIFICANT CHANGE UP (ref 2–14)
NEUTROPHILS # BLD AUTO: 4.02 K/UL — SIGNIFICANT CHANGE UP (ref 1.8–7.4)
NEUTROPHILS NFR BLD AUTO: 56.1 % — SIGNIFICANT CHANGE UP (ref 43–77)
NRBC # BLD: 0 /100 WBCS — SIGNIFICANT CHANGE UP (ref 0–0)
PLATELET # BLD AUTO: 448 K/UL — HIGH (ref 150–400)
RBC # BLD: 5.04 M/UL — SIGNIFICANT CHANGE UP (ref 4.2–5.8)
RBC # FLD: 18.2 % — HIGH (ref 10.3–14.5)
WBC # BLD: 7.17 K/UL — SIGNIFICANT CHANGE UP (ref 3.8–10.5)
WBC # FLD AUTO: 7.17 K/UL — SIGNIFICANT CHANGE UP (ref 3.8–10.5)

## 2021-03-31 PROCEDURE — 99285 EMERGENCY DEPT VISIT HI MDM: CPT

## 2021-03-31 PROCEDURE — 93010 ELECTROCARDIOGRAM REPORT: CPT

## 2021-03-31 RX ORDER — ACETAMINOPHEN 500 MG
975 TABLET ORAL ONCE
Refills: 0 | Status: COMPLETED | OUTPATIENT
Start: 2021-03-31 | End: 2021-03-31

## 2021-03-31 RX ORDER — SODIUM CHLORIDE 9 MG/ML
2000 INJECTION INTRAMUSCULAR; INTRAVENOUS; SUBCUTANEOUS ONCE
Refills: 0 | Status: COMPLETED | OUTPATIENT
Start: 2021-03-31 | End: 2021-03-31

## 2021-03-31 RX ORDER — PANTOPRAZOLE SODIUM 20 MG/1
40 TABLET, DELAYED RELEASE ORAL ONCE
Refills: 0 | Status: COMPLETED | OUTPATIENT
Start: 2021-03-31 | End: 2021-03-31

## 2021-03-31 RX ADMIN — PANTOPRAZOLE SODIUM 40 MILLIGRAM(S): 20 TABLET, DELAYED RELEASE ORAL at 23:36

## 2021-03-31 RX ADMIN — SODIUM CHLORIDE 2000 MILLILITER(S): 9 INJECTION INTRAMUSCULAR; INTRAVENOUS; SUBCUTANEOUS at 23:38

## 2021-03-31 NOTE — ED PROVIDER NOTE - OBJECTIVE STATEMENT
54 yo M with chronic abd pain 54 yo M with chronic abd pain, 2/2 alcoholic gastritis.  Pt. admits to drinking.  No other complaints.   ROS: negative for fever, cough, headache, chest pain, shortness of breath, nausea, vomiting, diarrhea, rash, paresthesia, and weakness--all other systems reviewed are negative.   PMH: ETOH abuse w/ DTs with frequent hospital admissions, HLD, alcoholic gastritis/esophagitis/GERD/PUD & hx of hypoxic respiratory failure requiring intubation due to aspiration PNA; Meds: See EMR for list; SH: Denies smoking/drinking/drug use 56 yo M with acute on chronic abd pain, 2/2 alcoholic gastritis.  + associated n/v.  Pt. admits to drinking.  No other complaints.   ROS: negative for fever, cough, headache, chest pain, shortness of breath, nausea, vomiting, diarrhea, rash, paresthesia, and weakness--all other systems reviewed are negative.   PMH: ETOH abuse w/ DTs with frequent hospital admissions, HLD, alcoholic gastritis/esophagitis/GERD/PUD & hx of hypoxic respiratory failure requiring intubation due to aspiration PNA; Meds: See EMR for list; SH: Denies smoking/drinking/drug use

## 2021-03-31 NOTE — ED PROVIDER NOTE - PROGRESS NOTE DETAILS
pt signed out to me from dr rodney, pt presented  today intoxicated, difficult IV line, frequently blowing, pt was not able to get ivf, initial lactate is 10, repeat lactate pending after ivf pt's line infiltrated again, iv line placed by the nurse in the foot ivf pending

## 2021-03-31 NOTE — ED ADULT NURSE NOTE - OBJECTIVE STATEMENT
hx of ETOH abuse C/O genrealized ABD pain assoicated with jabari cummings . pt admits to drinking today , however doesn't elaborate last drink and type. denies headache, tremors, AH/VH/SI/HI

## 2021-03-31 NOTE — ED ADULT NURSE NOTE - ED STAT RN HANDOFF DETAILS
handoff given to RN monserrat pt is uncooperative , pulled out COVID swab when attempted RN aware. RN made aware of rpt lactate post fluids. morphine and potassium peending endorsed to nurse for continued care. MD placed R

## 2021-03-31 NOTE — ED PROVIDER NOTE - CLINICAL SUMMARY MEDICAL DECISION MAKING FREE TEXT BOX
56 yo M alcoholic presents with abd pain, acute on chronic, pt. reports drinking, intoxicated.  Vital signs are concerning for possible bleed, pt. is an unreliable historian, doubt acs, possible pancreatitis  -basic labs, coags, trop, ckmb, etoh, type and screen, lipase, lactate, rvp/covid, ekg, CTA abd/pel, monitor, ns hydration bolus, protonix, tylenol for pain  -f/u results, reeval 56 yo M alcoholic presents with abd pain, acute on chronic, pt. reports drinking, intoxicated.  Vital signs are concerning, pt. is an unreliable historian, doubt acs, possible pancreatitis  -basic labs, coags, trop, ckmb, etoh, type and screen, lipase, lactate, rvp/covid, ekg, CT abd/pel, monitor, ns hydration bolus, Protonix, Tylenol/morphine for pain  -f/u results, reeval

## 2021-03-31 NOTE — ED PROVIDER NOTE - CARE PLAN
Principal Discharge DX:	Epigastric pain  Secondary Diagnosis:	Alcoholic intoxication without complication

## 2021-03-31 NOTE — ED ADULT NURSE NOTE - NSIMPLEMENTINTERV_GEN_ALL_ED
Implemented All Fall Risk Interventions:  [place] to call system. Call bell, personal items and telephone within reach. Instruct patient to call for assistance. Room bathroom lighting operational. Non-slip footwear when patient is off stretcher. Physically safe environment: no spills, clutter or unnecessary equipment. Stretcher in lowest position, wheels locked, appropriate side rails in place. Provide visual cue, wrist band, yellow gown, etc. Monitor gait and stability. Monitor for mental status changes and reorient to person, place, and time. Review medications for side effects contributing to fall risk. Reinforce activity limits and safety measures with patient and family.

## 2021-04-01 LAB
ALBUMIN SERPL ELPH-MCNC: 3.4 G/DL — SIGNIFICANT CHANGE UP (ref 3.3–5)
ALBUMIN SERPL ELPH-MCNC: 3.8 G/DL — SIGNIFICANT CHANGE UP (ref 3.3–5)
ALP SERPL-CCNC: 44 U/L — SIGNIFICANT CHANGE UP (ref 40–120)
ALP SERPL-CCNC: 58 U/L — SIGNIFICANT CHANGE UP (ref 40–120)
ALT FLD-CCNC: 29 U/L — SIGNIFICANT CHANGE UP (ref 12–78)
ALT FLD-CCNC: 31 U/L — SIGNIFICANT CHANGE UP (ref 12–78)
ANION GAP SERPL CALC-SCNC: 21 MMOL/L — HIGH (ref 5–17)
ANION GAP SERPL CALC-SCNC: 8 MMOL/L — SIGNIFICANT CHANGE UP (ref 5–17)
APTT BLD: 25.1 SEC — LOW (ref 27.5–35.5)
AST SERPL-CCNC: 30 U/L — SIGNIFICANT CHANGE UP (ref 15–37)
AST SERPL-CCNC: 35 U/L — SIGNIFICANT CHANGE UP (ref 15–37)
BILIRUB DIRECT SERPL-MCNC: 0.15 MG/DL — SIGNIFICANT CHANGE UP (ref 0.05–0.2)
BILIRUB INDIRECT FLD-MCNC: 0.4 MG/DL — SIGNIFICANT CHANGE UP (ref 0.2–1)
BILIRUB SERPL-MCNC: 0.2 MG/DL — SIGNIFICANT CHANGE UP (ref 0.2–1.2)
BILIRUB SERPL-MCNC: 0.5 MG/DL — SIGNIFICANT CHANGE UP (ref 0.2–1.2)
BLD GP AB SCN SERPL QL: SIGNIFICANT CHANGE UP
BUN SERPL-MCNC: 13 MG/DL — SIGNIFICANT CHANGE UP (ref 7–23)
BUN SERPL-MCNC: 14 MG/DL — SIGNIFICANT CHANGE UP (ref 7–23)
CALCIUM SERPL-MCNC: 8.9 MG/DL — SIGNIFICANT CHANGE UP (ref 8.5–10.1)
CALCIUM SERPL-MCNC: 9.2 MG/DL — SIGNIFICANT CHANGE UP (ref 8.5–10.1)
CHLORIDE SERPL-SCNC: 102 MMOL/L — SIGNIFICANT CHANGE UP (ref 96–108)
CHLORIDE SERPL-SCNC: 104 MMOL/L — SIGNIFICANT CHANGE UP (ref 96–108)
CK MB CFR SERPL CALC: <1 NG/ML — SIGNIFICANT CHANGE UP (ref 0.5–3.6)
CO2 SERPL-SCNC: 19 MMOL/L — LOW (ref 22–31)
CO2 SERPL-SCNC: 28 MMOL/L — SIGNIFICANT CHANGE UP (ref 22–31)
CREAT SERPL-MCNC: 0.88 MG/DL — SIGNIFICANT CHANGE UP (ref 0.5–1.3)
CREAT SERPL-MCNC: 1.08 MG/DL — SIGNIFICANT CHANGE UP (ref 0.5–1.3)
ETHANOL SERPL-MCNC: 273 MG/DL — HIGH (ref 0–10)
GLUCOSE SERPL-MCNC: 100 MG/DL — HIGH (ref 70–99)
GLUCOSE SERPL-MCNC: 100 MG/DL — HIGH (ref 70–99)
INR BLD: 1.04 RATIO — SIGNIFICANT CHANGE UP (ref 0.88–1.16)
LACTATE SERPL-SCNC: 1.9 MMOL/L — SIGNIFICANT CHANGE UP (ref 0.7–2)
LACTATE SERPL-SCNC: 10.6 MMOL/L — CRITICAL HIGH (ref 0.7–2)
LACTATE SERPL-SCNC: 10.9 MMOL/L — CRITICAL HIGH (ref 0.7–2)
LIDOCAIN IGE QN: 304 U/L — SIGNIFICANT CHANGE UP (ref 73–393)
MAGNESIUM SERPL-MCNC: 1.5 MG/DL — LOW (ref 1.6–2.6)
PHOSPHATE SERPL-MCNC: 1.9 MG/DL — LOW (ref 2.5–4.5)
POTASSIUM SERPL-MCNC: 3.3 MMOL/L — LOW (ref 3.5–5.3)
POTASSIUM SERPL-MCNC: 3.5 MMOL/L — SIGNIFICANT CHANGE UP (ref 3.5–5.3)
POTASSIUM SERPL-SCNC: 3.3 MMOL/L — LOW (ref 3.5–5.3)
POTASSIUM SERPL-SCNC: 3.5 MMOL/L — SIGNIFICANT CHANGE UP (ref 3.5–5.3)
PROT SERPL-MCNC: 7.8 GM/DL — SIGNIFICANT CHANGE UP (ref 6–8.3)
PROT SERPL-MCNC: 9 GM/DL — HIGH (ref 6–8.3)
PROTHROM AB SERPL-ACNC: 12 SEC — SIGNIFICANT CHANGE UP (ref 10.6–13.6)
SODIUM SERPL-SCNC: 140 MMOL/L — SIGNIFICANT CHANGE UP (ref 135–145)
SODIUM SERPL-SCNC: 142 MMOL/L — SIGNIFICANT CHANGE UP (ref 135–145)
TROPONIN I SERPL-MCNC: <.015 NG/ML — SIGNIFICANT CHANGE UP (ref 0.01–0.04)

## 2021-04-01 PROCEDURE — 71045 X-RAY EXAM CHEST 1 VIEW: CPT | Mod: 26

## 2021-04-01 PROCEDURE — 76937 US GUIDE VASCULAR ACCESS: CPT | Mod: 26

## 2021-04-01 PROCEDURE — 74176 CT ABD & PELVIS W/O CONTRAST: CPT | Mod: 26,MA

## 2021-04-01 PROCEDURE — G9005: CPT

## 2021-04-01 PROCEDURE — 36410 VNPNXR 3YR/> PHY/QHP DX/THER: CPT

## 2021-04-01 PROCEDURE — 99223 1ST HOSP IP/OBS HIGH 75: CPT

## 2021-04-01 RX ORDER — AMLODIPINE BESYLATE 2.5 MG/1
10 TABLET ORAL DAILY
Refills: 0 | Status: DISCONTINUED | OUTPATIENT
Start: 2021-04-01 | End: 2021-04-05

## 2021-04-01 RX ORDER — SODIUM CHLORIDE 9 MG/ML
2000 INJECTION INTRAMUSCULAR; INTRAVENOUS; SUBCUTANEOUS ONCE
Refills: 0 | Status: COMPLETED | OUTPATIENT
Start: 2021-04-01 | End: 2021-04-01

## 2021-04-01 RX ORDER — THIAMINE MONONITRATE (VIT B1) 100 MG
100 TABLET ORAL DAILY
Refills: 0 | Status: DISCONTINUED | OUTPATIENT
Start: 2021-04-01 | End: 2021-04-05

## 2021-04-01 RX ORDER — MORPHINE SULFATE 50 MG/1
2 CAPSULE, EXTENDED RELEASE ORAL ONCE
Refills: 0 | Status: DISCONTINUED | OUTPATIENT
Start: 2021-04-01 | End: 2021-04-01

## 2021-04-01 RX ORDER — ACETAMINOPHEN 500 MG
650 TABLET ORAL EVERY 6 HOURS
Refills: 0 | Status: DISCONTINUED | OUTPATIENT
Start: 2021-04-01 | End: 2021-04-05

## 2021-04-01 RX ORDER — POTASSIUM PHOSPHATE, MONOBASIC POTASSIUM PHOSPHATE, DIBASIC 236; 224 MG/ML; MG/ML
15 INJECTION, SOLUTION INTRAVENOUS ONCE
Refills: 0 | Status: COMPLETED | OUTPATIENT
Start: 2021-04-01 | End: 2021-04-01

## 2021-04-01 RX ORDER — FOLIC ACID 0.8 MG
1 TABLET ORAL DAILY
Refills: 0 | Status: DISCONTINUED | OUTPATIENT
Start: 2021-04-01 | End: 2021-04-05

## 2021-04-01 RX ORDER — ASPIRIN/CALCIUM CARB/MAGNESIUM 324 MG
81 TABLET ORAL DAILY
Refills: 0 | Status: DISCONTINUED | OUTPATIENT
Start: 2021-04-01 | End: 2021-04-01

## 2021-04-01 RX ORDER — POTASSIUM CHLORIDE 20 MEQ
40 PACKET (EA) ORAL EVERY 4 HOURS
Refills: 0 | Status: COMPLETED | OUTPATIENT
Start: 2021-04-01 | End: 2021-04-02

## 2021-04-01 RX ORDER — ENOXAPARIN SODIUM 100 MG/ML
40 INJECTION SUBCUTANEOUS DAILY
Refills: 0 | Status: DISCONTINUED | OUTPATIENT
Start: 2021-04-01 | End: 2021-04-05

## 2021-04-01 RX ORDER — SODIUM CHLORIDE 9 MG/ML
1000 INJECTION, SOLUTION INTRAVENOUS
Refills: 0 | Status: DISCONTINUED | OUTPATIENT
Start: 2021-04-01 | End: 2021-04-03

## 2021-04-01 RX ORDER — PANTOPRAZOLE SODIUM 20 MG/1
40 TABLET, DELAYED RELEASE ORAL
Refills: 0 | Status: DISCONTINUED | OUTPATIENT
Start: 2021-04-01 | End: 2021-04-05

## 2021-04-01 RX ORDER — METOCLOPRAMIDE HCL 10 MG
10 TABLET ORAL ONCE
Refills: 0 | Status: COMPLETED | OUTPATIENT
Start: 2021-03-31 | End: 2021-03-31

## 2021-04-01 RX ORDER — ATORVASTATIN CALCIUM 80 MG/1
20 TABLET, FILM COATED ORAL AT BEDTIME
Refills: 0 | Status: DISCONTINUED | OUTPATIENT
Start: 2021-04-01 | End: 2021-04-05

## 2021-04-01 RX ORDER — POTASSIUM CHLORIDE 20 MEQ
40 PACKET (EA) ORAL ONCE
Refills: 0 | Status: COMPLETED | OUTPATIENT
Start: 2021-04-01 | End: 2021-04-01

## 2021-04-01 RX ORDER — FAMOTIDINE 10 MG/ML
20 INJECTION INTRAVENOUS ONCE
Refills: 0 | Status: COMPLETED | OUTPATIENT
Start: 2021-03-31 | End: 2021-03-31

## 2021-04-01 RX ORDER — ONDANSETRON 8 MG/1
4 TABLET, FILM COATED ORAL EVERY 6 HOURS
Refills: 0 | Status: DISCONTINUED | OUTPATIENT
Start: 2021-04-01 | End: 2021-04-05

## 2021-04-01 RX ORDER — MAGNESIUM SULFATE 500 MG/ML
2 VIAL (ML) INJECTION ONCE
Refills: 0 | Status: COMPLETED | OUTPATIENT
Start: 2021-04-01 | End: 2021-04-01

## 2021-04-01 RX ORDER — CEFEPIME 1 G/1
1000 INJECTION, POWDER, FOR SOLUTION INTRAMUSCULAR; INTRAVENOUS EVERY 8 HOURS
Refills: 0 | Status: DISCONTINUED | OUTPATIENT
Start: 2021-04-01 | End: 2021-04-03

## 2021-04-01 RX ORDER — ATORVASTATIN CALCIUM 80 MG/1
20 TABLET, FILM COATED ORAL AT BEDTIME
Refills: 0 | Status: DISCONTINUED | OUTPATIENT
Start: 2021-04-01 | End: 2021-04-01

## 2021-04-01 RX ADMIN — Medication 650 MILLIGRAM(S): at 22:04

## 2021-04-01 RX ADMIN — MORPHINE SULFATE 2 MILLIGRAM(S): 50 CAPSULE, EXTENDED RELEASE ORAL at 08:02

## 2021-04-01 RX ADMIN — MORPHINE SULFATE 2 MILLIGRAM(S): 50 CAPSULE, EXTENDED RELEASE ORAL at 01:45

## 2021-04-01 RX ADMIN — MORPHINE SULFATE 2 MILLIGRAM(S): 50 CAPSULE, EXTENDED RELEASE ORAL at 11:49

## 2021-04-01 RX ADMIN — SODIUM CHLORIDE 100 MILLILITER(S): 9 INJECTION, SOLUTION INTRAVENOUS at 19:34

## 2021-04-01 RX ADMIN — SODIUM CHLORIDE 2000 MILLILITER(S): 9 INJECTION INTRAMUSCULAR; INTRAVENOUS; SUBCUTANEOUS at 01:45

## 2021-04-01 RX ADMIN — Medication 2 MILLIGRAM(S): at 21:36

## 2021-04-01 RX ADMIN — PANTOPRAZOLE SODIUM 40 MILLIGRAM(S): 20 TABLET, DELAYED RELEASE ORAL at 12:48

## 2021-04-01 RX ADMIN — CEFEPIME 100 MILLIGRAM(S): 1 INJECTION, POWDER, FOR SOLUTION INTRAMUSCULAR; INTRAVENOUS at 17:40

## 2021-04-01 RX ADMIN — ONDANSETRON 4 MILLIGRAM(S): 8 TABLET, FILM COATED ORAL at 21:36

## 2021-04-01 RX ADMIN — ATORVASTATIN CALCIUM 20 MILLIGRAM(S): 80 TABLET, FILM COATED ORAL at 21:35

## 2021-04-01 RX ADMIN — Medication 10 MILLIGRAM(S): at 00:17

## 2021-04-01 RX ADMIN — Medication 40 MILLIEQUIVALENT(S): at 08:20

## 2021-04-01 RX ADMIN — Medication 100 MILLIGRAM(S): at 12:48

## 2021-04-01 RX ADMIN — Medication 50 GRAM(S): at 21:37

## 2021-04-01 RX ADMIN — FAMOTIDINE 20 MILLIGRAM(S): 10 INJECTION INTRAVENOUS at 00:17

## 2021-04-01 RX ADMIN — POTASSIUM PHOSPHATE, MONOBASIC POTASSIUM PHOSPHATE, DIBASIC 62.5 MILLIMOLE(S): 236; 224 INJECTION, SOLUTION INTRAVENOUS at 21:37

## 2021-04-01 RX ADMIN — Medication 50 MILLIGRAM(S): at 07:09

## 2021-04-01 RX ADMIN — Medication 650 MILLIGRAM(S): at 21:34

## 2021-04-01 RX ADMIN — Medication 40 MILLIEQUIVALENT(S): at 21:34

## 2021-04-01 RX ADMIN — MORPHINE SULFATE 2 MILLIGRAM(S): 50 CAPSULE, EXTENDED RELEASE ORAL at 12:30

## 2021-04-01 RX ADMIN — MORPHINE SULFATE 2 MILLIGRAM(S): 50 CAPSULE, EXTENDED RELEASE ORAL at 08:20

## 2021-04-01 NOTE — PROCEDURE NOTE - NSPROCDETAILS_GEN_ALL_CORE
location identified, draped/prepped, sterile technique used/sterile dressing applied/sterile technique, catheter placed

## 2021-04-01 NOTE — CONSULT NOTE ADULT - ATTENDING COMMENTS
Agree with above.  Patient is well known to system for recurrent etOH.  Has had elevated lactate on several occasions in the past (as high as 15), and it is high again today.  Patient has tremors at baseline, but has a clear sensorium and is *not* withdrawing from alcohol at this time.  Would not put patient on standing benzos, but rather can be placed on a symptom-triggered PRN treatment if necessary. Please see prior notes by behavioral health (Dr Hernandez) regarding regimens that have worked for him in the past.  Lactate elevation is not due to sepsis. Would D/C 4L NS, as it is not needed. Patient shows no signs of hypotension or any perfusion imbalance at this time. His BP is actually somewhat elevated.  Differential for elevated lactate includes:  - ?occult seizure prior to admission: may be reason, but clearance of lactate should be relatively quick in this case, and does not require any intervention at this time.  - Type B1 lactic acidosis due to chronic low-grade liver damage, which can cause perturbations in Cori cycle and inability to clear lactate.    Patient is awake, alert, hemodynamically stable, and protecting airway well. He shows no signs of active withdrawal.  Therefore, there is no need for ICU level of care at this time.  Thank you for the consult. Please feel free to re-consult as needed.

## 2021-04-01 NOTE — H&P ADULT - NSHPLABSRESULTS_GEN_ALL_CORE
LABS:                        12.7   7.17  )-----------( 448      ( 31 Mar 2021 23:54 )             39.5     03-31    142  |  102  |  14  ----------------------------<  100<H>  3.3<L>   |  19<L>  |  1.08    Ca    9.2      31 Mar 2021 23:54    TPro  9.0<H>  /  Alb  3.8  /  TBili  0.2  /  DBili  x   /  AST  35  /  ALT  31  /  AlkPhos  58  03-31    PT/INR - ( 31 Mar 2021 23:54 )   PT: 12.0 sec;   INR: 1.04 ratio         PTT - ( 31 Mar 2021 23:54 )  PTT:25.1 sec  CARDIAC MARKERS ( 31 Mar 2021 23:54 )  <.015 ng/mL / x     / x     / x     / <1.0 ng/mL        Lactate, Blood: 10.6 mmol/L (04-01 @ 06:04)  Lactate, Blood: 10.9 mmol/L (03-31 @ 23:54)

## 2021-04-01 NOTE — ED ADULT NURSE REASSESSMENT NOTE - NS ED NURSE REASSESS COMMENT FT1
Report received from PRETTY Restrepo in ED at 7am. Patient states in pain at this time. Meds as per order. IV c/d/i. Safety measures in place. Purposeful rounding done x1 hour. Will continue to monitor. Report received from PRETTY Restrepo in ED at 7am. Patient states in pain at this time. Meds as per order. Given as per MAR. IV c/d/i. Safety measures in place. Pt placed on bedside cardiac monitor. Purposeful rounding done x1 hour. Will continue to monitor. Report received from PRETTY Restrepo in ED at 7am. Patient states in pain at this time. Meds as per order. Given as per MAR. Foot IV c/d/i. Safety measures in place. Pt placed on bedside cardiac monitor. Purposeful rounding done x1 hour. Will continue to monitor. Report received from PRETTY Restrepo in ED at 7am. Patient states in pain at this time. Meds as per order. Given as per MAR. Foot IV c/d/i. Right EJ infiltrated at change of shift and taken out at this time. Safety measures in place. Pt placed on bedside cardiac monitor. Purposeful rounding done x1 hour. Will continue to monitor.

## 2021-04-01 NOTE — SBIRT NOTE ADULT - NSSBIRTBRIEFINTDET_GEN_A_CORE
Provided SBIRT services: Full screen positive. Referral to Treatment attempted. Screening results were reviewed with the patient and patient was provided information about healthy guidelines and potential negative consequences associated with level of risk. Motivation and readiness to reduce or stop use was discussed and goals and activities to make changes were suggested/offered. Options discussed for further evaluation and treatment, but referral to treatment was not completed because: Patient refused- stated he is "only drinking 2-3 times a week". When offered support/tx referrals, pt stated he did not need them and will do it on his own. Provided SBIRT flyer and encouraged to call in for referrals.

## 2021-04-01 NOTE — ED ADULT NURSE REASSESSMENT NOTE - NS ED NURSE REASSESS COMMENT FT1
Attempted to give report. Floor refused due needing orders for CIWA score. Dr Cronin made aware. States needing rpt lactate.

## 2021-04-01 NOTE — PROCEDURE NOTE - ADDITIONAL PROCEDURE DETAILS
s/p midline placement under US guidance in the left basilic vein. 4fr x 20cm, single lumen. DSD applied. Patient tolerated procedure well. No complications.    Midline may be accessed.

## 2021-04-01 NOTE — CONSULT NOTE ADULT - ASSESSMENT
56 YO male with PMH ETOH abuse w/ DTs with frequent hospital admissions, HLD, alcoholic gastritis/esophagitis/GERD/PUD & hx of hypoxic respiratory failure requiring intubation due to aspiration PNA, presents with abdominal pain.  CT A/P negative for acute pathology. Noted lactate of 10.9. Alcohol level in the 200s.  Admitted to medicine, ICU consulted for lactic acidosis.    Pt was seen, alert, conversing, answer questions appropriately.  Noted to have lactate of 10.9, but non-toxic appearing. Afebrile, no leukocytosis.  Chronic tremors of B/L hands noted.      PLAN:  Lactic acidosis likely attributed to chronic alcohol abuse.  Does not require ICU level of care at this time.  Please reconsult as necessary.

## 2021-04-01 NOTE — H&P ADULT - ASSESSMENT
54 yo M       h/o  chronic ETOH abuse w/ DTs with frequent hospital admissions, HLD, alcoholic gastritis/esophagitis        & hx of hypoxic respiratory failure requiring intubation due to aspiration PNA,             admitted with   etoh intoxication  weakness  and lactic acidosis due to dehydration/  daily  etoh abuse.     his  last   visit was  in  the ICU on a Precedex gtt and phenobarb on 3/5./21     per son, pt  drinks whiskey on a regular basis,  about half a bottle,  for past 25 years   HTN/  HLD,    pt  is non compliant  with his meds,  norvasc, lipitor   on CIWA  protocol   iv  fluids    on dvt ppx    If  er  is unable to   get  iv acess, then will  need IR   eval   pt  with high lactate.  needs  f/p lactate level     Son, Lavelle, [phone]    56 yo M       h/o  chronic ETOH abuse ith h/o / DTs with frequent hospital admissions, HLD, alcoholic gastritis.         & hx of hypoxic respiratory failure requiring intubation due to aspiration PNA,. on 3/21      his  last   visit was  in  the ICU on a Precedex gtt and phenobarb on 3/5./21    pt states he  drinks  vodka  and  whiskey              ---  admitted with   etoh intoxication/  and  impending DT's      lactic acidosis due to dehydration  and  etoh  toxicity      pt has / AALA.  alcohol associated   lactic acidosis      on    CIWA   protocol      in fluids.  thiamine,  protonix. dvt ppx      per son, pt  drinks whiskey on a regular basis,  about half  to a  full  bottle,  for past 25 years          ---HTN/  HLD,    pt  is non compliant  with his meds,  norvasc, lipitor.  on  hold   for  now        on dvt ppx     ER  wa s unable to get  a good  iv access,  currently  has   iv access,  on  right foot        IR   eval  for  iv access        labs,  f/p  lactate,  blood   c/s ordered/  empiric  cefepime       pt is  ill  and, therfore    ICU  consult  obtained     SonLavelle, [phone]    56 yo M       h/o  chronic ETOH abuse ith h/o / DTs with frequent hospital admissions, HLD, alcoholic gastritis.         & hx of hypoxic respiratory failure requiring intubation due to aspiration PNA,. on 3/21      his  last   visit was  in  the ICU on a Precedex gtt and phenobarb on 3/5./21    pt states he  drinks  vodka  and  whiskey              ---  admitted with   etoh intoxication/  and  impending DT's      lactic acidosis due to dehydration  and  etoh  toxicity      pt has / AALA.  alcohol associated   lactic acidosis     pt  states,   he  ha s pain  all over  his  body,, which can occur  with high lactate      on    CIWA   protocol      in fluids.  thiamine,  protonix. dvt ppx      per son, pt  drinks whiskey on a regular basis,  about half  to a  full  bottle,  for past 25 years          ---HTN/  HLD,    pt  is non compliant  with his meds,  norvasc, lipitor.  on  hold   for  now             ER  wa s unable to get  a good  iv access,  currently  has   iv access,  on  right foot        IR   eval  for  iv access        labs,  f/p  lactate,  blood   c/s ordered/  empiric  cefepime       pt is  ill  and, therefore     ICU/  dr rojas,  consult  obtained     SonLavelle, [phone]    56 yo M       h/o  chronic ETOH abuse ith h/o / DTs with frequent hospital admissions, HLD, alcoholic gastritis.         & hx of hypoxic respiratory failure requiring intubation due to aspiration PNA,. on 3/21      his  last   visit was  in  the ICU on a Precedex gtt and phenobarb on 3/5./21  seen by [facility] on prior visit    pt  is  unemployed ,  and,  states he  drinks  vodka  and  whiskey              ---  admitted with   etoh intoxication/  and  impending DT's      lactic acidosis due to dehydration  and  etoh  toxicity      pt has / AALA.  alcohol associated   lactic acidosis     pt  states,   he  ha s pain  all over  his  body,, which can occur  with high lactate      on    CIWA   protocol      in fluids.  thiamine,  protonix. dvt ppx          ---HTN/  HLD,    pt  is non compliant  with his meds,  norvasc, lipitor.  on  hold   for  now             ER  wa s unable to get  a good  iv access,  currently  has   iv access,  on  right foot        IR   eval  for  iv access        labs,  f/p  lactate,  blood   c/s ,  cxr , ordered/      on   empiric  cefepime /  h/o  aspiration  in the  past      pt is  ill  and, therefore     ICU/  dr rojas,  consult  obtained      per son, pt  drinks whiskey on a regular basis,  about half  to a  full  bottle,  for past 25 years  Son, Lavelle, [phone]

## 2021-04-01 NOTE — ED ADULT NURSE REASSESSMENT NOTE - NS ED NURSE REASSESS COMMENT FT1
pt is a hard stick MD placed 20 G R ac, @ 1:30, @ 200 pt  uncooperative moving around line infiltrated IV removed.   RN placed 22 G R lat foot bilat @2354 and 3:50 wrapped with kerlix pt managed to displace IV. fluids unable to be completed Dr. Shields made aware. throughout process. will continue to monitor.

## 2021-04-01 NOTE — H&P ADULT - HISTORY OF PRESENT ILLNESS
56 yo M    h/o etoh abuse/  DT'S.  frequent visits/  h/o aspiration jing,  reuiring intubation     with acute on chronic abd pain, 2/2 alcoholic gastritis.  + associated n/v.       Pt. admits to  daily  drinking.  	ROS: negative for fever, cough, headache, chest pain, shortness of breath, nausea, vomiting, diarrhea,     pt is  afbrile.  has  been in er overnight,  . and has  difficult   situation with iv access 56 yo M    h/o etoh abuse/  DT'S.  frequent visits/  h/o aspiration jing,  reuiring intubation/ phenobarb  , on  recent  visit    just   d/c pn 3/24/21.  after  an  almost 15 day stay      admitted  with   etoh toxicity ,with acute on chronic abd pain, 2/2 alcoholic gastritis.  + associated n/v.       Pt. admits to  daily  drinking.  	ROS: negative for fever, cough, headache, chest pain, shortness of breath, nausea, vomiting, diarrhea,     pt is  afebrile   has  been in er overnight,  . and has  difficult   situation with iv access    currently in Er,  has  a peripheral  iv access  on right foot

## 2021-04-01 NOTE — ED ADULT NURSE REASSESSMENT NOTE - NS ED NURSE REASSESS COMMENT FT1
Covering for primary RN Kalyani. Pt in bed, requesting pain meds. Dr Mckay made aware. Awaiting new orders.

## 2021-04-01 NOTE — H&P ADULT - NSHPPHYSICALEXAM_GEN_ALL_CORE
PHYSICAL EXAMINATION:  Vital Signs Last 24 Hrs  T(C): 37.1 (01 Apr 2021 11:38), Max: 37.2 (01 Apr 2021 07:46)  T(F): 98.7 (01 Apr 2021 11:38), Max: 98.9 (01 Apr 2021 07:46)  HR: 86 (01 Apr 2021 11:38) (86 - 124)  BP: 155/88 (01 Apr 2021 11:38) (143/96 - 158/83)  BP(mean): --  RR: 185 (01 Apr 2021 11:38) (18 - 185)  SpO2: 95% (01 Apr 2021 11:38) (95% - 99%)  CAPILLARY BLOOD GLUCOSE      POCT Blood Glucose.: 85 mg/dL (31 Mar 2021 21:56)        GENERAL: NAD, well-groomed,  HEAD:  atraumatic, normocephalic  EYES: sclera anicteric  ENMT: mucous membranes moist  NECK: supple, No JVD  CHEST/LUNG: clear to auscultation bilaterally;    no      rales   ,   no rhonchi,   HEART: normal S1, S2  ABDOMEN: BS+, soft, ND, NT   EXTREMITIES:    no    edema    b/l LEs  NEURO: awake, ,     moves all extremities  SKIN: no     rash PHYSICAL EXAMINATION:  Vital Signs Last 24 Hrs  T(C): 37.1 (01 Apr 2021 11:38), Max: 37.2 (01 Apr 2021 07:46)  T(F): 98.7 (01 Apr 2021 11:38), Max: 98.9 (01 Apr 2021 07:46)  HR: 86 (01 Apr 2021 11:38) (86 - 124)  BP: 155/88 (01 Apr 2021 11:38) (143/96 - 158/83)  BP(mean): --  RR: 185 (01 Apr 2021 11:38) (18 - 185)  SpO2: 95% (01 Apr 2021 11:38) (95% - 99%)  CAPILLARY BLOOD GLUCOSE      POCT Blood Glucose.: 85 mg/dL (31 Mar 2021 21:56)        GENERAL: NAD, well-groomed,  HEAD:  atraumatic, normocephalic  EYES: sclera anicteric  ENMT: mucous membranes moist  NECK: supple, No JVD  CHEST/LUNG: clear to auscultation bilaterally;    no      rales   ,   no rhonchi,   HEART: normal S1, S2  ABDOMEN: BS+, soft, ND, NT .  no  rebound  /  guarding  EXTREMITIES:    no    edema    b/l LEs  NEURO: awake, ,   alert ,    moves all extremities  SKIN: no     rash      tremor   of   b/l arms

## 2021-04-02 LAB
ALBUMIN SERPL ELPH-MCNC: 3.3 G/DL — SIGNIFICANT CHANGE UP (ref 3.3–5)
ALP SERPL-CCNC: 46 U/L — SIGNIFICANT CHANGE UP (ref 40–120)
ALT FLD-CCNC: 31 U/L — SIGNIFICANT CHANGE UP (ref 12–78)
ANION GAP SERPL CALC-SCNC: 7 MMOL/L — SIGNIFICANT CHANGE UP (ref 5–17)
AST SERPL-CCNC: 32 U/L — SIGNIFICANT CHANGE UP (ref 15–37)
B-OH-BUTYR SERPL-SCNC: 0.5 MMOL/L — HIGH
BILIRUB SERPL-MCNC: 0.9 MG/DL — SIGNIFICANT CHANGE UP (ref 0.2–1.2)
BUN SERPL-MCNC: 10 MG/DL — SIGNIFICANT CHANGE UP (ref 7–23)
CALCIUM SERPL-MCNC: 9 MG/DL — SIGNIFICANT CHANGE UP (ref 8.5–10.1)
CHLORIDE SERPL-SCNC: 102 MMOL/L — SIGNIFICANT CHANGE UP (ref 96–108)
CO2 SERPL-SCNC: 27 MMOL/L — SIGNIFICANT CHANGE UP (ref 22–31)
COVID-19 SPIKE DOMAIN AB INTERP: POSITIVE
COVID-19 SPIKE DOMAIN ANTIBODY RESULT: 55 U/ML — HIGH
CREAT SERPL-MCNC: 0.88 MG/DL — SIGNIFICANT CHANGE UP (ref 0.5–1.3)
GLUCOSE SERPL-MCNC: 90 MG/DL — SIGNIFICANT CHANGE UP (ref 70–99)
HCT VFR BLD CALC: 33.6 % — LOW (ref 39–50)
HGB BLD-MCNC: 10.8 G/DL — LOW (ref 13–17)
MAGNESIUM SERPL-MCNC: 2.2 MG/DL — SIGNIFICANT CHANGE UP (ref 1.6–2.6)
MCHC RBC-ENTMCNC: 25.4 PG — LOW (ref 27–34)
MCHC RBC-ENTMCNC: 32.1 GM/DL — SIGNIFICANT CHANGE UP (ref 32–36)
MCV RBC AUTO: 78.9 FL — LOW (ref 80–100)
NRBC # BLD: 0 /100 WBCS — SIGNIFICANT CHANGE UP (ref 0–0)
OSMOLALITY SERPL: 288 MOSMOL/KG — SIGNIFICANT CHANGE UP (ref 275–300)
PHOSPHATE SERPL-MCNC: 3.3 MG/DL — SIGNIFICANT CHANGE UP (ref 2.5–4.5)
PLATELET # BLD AUTO: 265 K/UL — SIGNIFICANT CHANGE UP (ref 150–400)
POTASSIUM SERPL-MCNC: 3.8 MMOL/L — SIGNIFICANT CHANGE UP (ref 3.5–5.3)
POTASSIUM SERPL-SCNC: 3.8 MMOL/L — SIGNIFICANT CHANGE UP (ref 3.5–5.3)
PROT SERPL-MCNC: 7.7 GM/DL — SIGNIFICANT CHANGE UP (ref 6–8.3)
RBC # BLD: 4.26 M/UL — SIGNIFICANT CHANGE UP (ref 4.2–5.8)
RBC # FLD: 18 % — HIGH (ref 10.3–14.5)
SARS-COV-2 IGG+IGM SERPL QL IA: 55 U/ML — HIGH
SARS-COV-2 IGG+IGM SERPL QL IA: POSITIVE
SODIUM SERPL-SCNC: 136 MMOL/L — SIGNIFICANT CHANGE UP (ref 135–145)
WBC # BLD: 3.76 K/UL — LOW (ref 3.8–10.5)
WBC # FLD AUTO: 3.76 K/UL — LOW (ref 3.8–10.5)

## 2021-04-02 PROCEDURE — 99232 SBSQ HOSP IP/OBS MODERATE 35: CPT

## 2021-04-02 PROCEDURE — 93010 ELECTROCARDIOGRAM REPORT: CPT

## 2021-04-02 RX ORDER — FAMOTIDINE 10 MG/ML
20 INJECTION INTRAVENOUS ONCE
Refills: 0 | Status: COMPLETED | OUTPATIENT
Start: 2021-04-02 | End: 2021-04-02

## 2021-04-02 RX ADMIN — Medication 2 MILLIGRAM(S): at 05:43

## 2021-04-02 RX ADMIN — Medication 1 TABLET(S): at 11:03

## 2021-04-02 RX ADMIN — Medication 2 MILLIGRAM(S): at 17:59

## 2021-04-02 RX ADMIN — CEFEPIME 100 MILLIGRAM(S): 1 INJECTION, POWDER, FOR SOLUTION INTRAMUSCULAR; INTRAVENOUS at 05:43

## 2021-04-02 RX ADMIN — Medication 40 MILLIEQUIVALENT(S): at 01:49

## 2021-04-02 RX ADMIN — Medication 1 MILLIGRAM(S): at 11:03

## 2021-04-02 RX ADMIN — ENOXAPARIN SODIUM 40 MILLIGRAM(S): 100 INJECTION SUBCUTANEOUS at 11:03

## 2021-04-02 RX ADMIN — ATORVASTATIN CALCIUM 20 MILLIGRAM(S): 80 TABLET, FILM COATED ORAL at 22:14

## 2021-04-02 RX ADMIN — Medication 2 MILLIGRAM(S): at 01:49

## 2021-04-02 RX ADMIN — CEFEPIME 100 MILLIGRAM(S): 1 INJECTION, POWDER, FOR SOLUTION INTRAMUSCULAR; INTRAVENOUS at 14:10

## 2021-04-02 RX ADMIN — CEFEPIME 100 MILLIGRAM(S): 1 INJECTION, POWDER, FOR SOLUTION INTRAMUSCULAR; INTRAVENOUS at 00:56

## 2021-04-02 RX ADMIN — Medication 30 MILLILITER(S): at 20:32

## 2021-04-02 RX ADMIN — PANTOPRAZOLE SODIUM 40 MILLIGRAM(S): 20 TABLET, DELAYED RELEASE ORAL at 05:43

## 2021-04-02 RX ADMIN — Medication 2 MILLIGRAM(S): at 11:00

## 2021-04-02 RX ADMIN — CEFEPIME 100 MILLIGRAM(S): 1 INJECTION, POWDER, FOR SOLUTION INTRAMUSCULAR; INTRAVENOUS at 22:06

## 2021-04-02 RX ADMIN — FAMOTIDINE 20 MILLIGRAM(S): 10 INJECTION INTRAVENOUS at 21:05

## 2021-04-02 RX ADMIN — Medication 2 MILLIGRAM(S): at 14:06

## 2021-04-02 RX ADMIN — AMLODIPINE BESYLATE 10 MILLIGRAM(S): 2.5 TABLET ORAL at 05:43

## 2021-04-02 RX ADMIN — Medication 1.5 MILLIGRAM(S): at 22:14

## 2021-04-02 RX ADMIN — Medication 100 MILLIGRAM(S): at 11:03

## 2021-04-02 NOTE — CONSULT NOTE ADULT - PROBLEM SELECTOR RECOMMENDATION 3
Alcohol abstinence discussed at length with patient  Thiamine, MVI and folic acid Retinoid Dermatitis Normal Treatment: I recommended more frequent application of Cetaphil or CeraVe to the areas of dermatitis.

## 2021-04-03 LAB
ANION GAP SERPL CALC-SCNC: 6 MMOL/L — SIGNIFICANT CHANGE UP (ref 5–17)
APPEARANCE UR: CLEAR — SIGNIFICANT CHANGE UP
BACTERIA # UR AUTO: ABNORMAL
BILIRUB UR-MCNC: NEGATIVE — SIGNIFICANT CHANGE UP
BUN SERPL-MCNC: 6 MG/DL — LOW (ref 7–23)
CALCIUM SERPL-MCNC: 9.3 MG/DL — SIGNIFICANT CHANGE UP (ref 8.5–10.1)
CHLORIDE SERPL-SCNC: 103 MMOL/L — SIGNIFICANT CHANGE UP (ref 96–108)
CO2 SERPL-SCNC: 29 MMOL/L — SIGNIFICANT CHANGE UP (ref 22–31)
COLOR SPEC: YELLOW — SIGNIFICANT CHANGE UP
CREAT SERPL-MCNC: 1.01 MG/DL — SIGNIFICANT CHANGE UP (ref 0.5–1.3)
DIFF PNL FLD: NEGATIVE — SIGNIFICANT CHANGE UP
EPI CELLS # UR: SIGNIFICANT CHANGE UP
GLUCOSE SERPL-MCNC: 85 MG/DL — SIGNIFICANT CHANGE UP (ref 70–99)
GLUCOSE UR QL: NEGATIVE MG/DL — SIGNIFICANT CHANGE UP
HCT VFR BLD CALC: 35.3 % — LOW (ref 39–50)
HGB BLD-MCNC: 11.1 G/DL — LOW (ref 13–17)
KETONES UR-MCNC: NEGATIVE — SIGNIFICANT CHANGE UP
LEUKOCYTE ESTERASE UR-ACNC: ABNORMAL
MAGNESIUM SERPL-MCNC: 1.9 MG/DL — SIGNIFICANT CHANGE UP (ref 1.6–2.6)
MCHC RBC-ENTMCNC: 25.1 PG — LOW (ref 27–34)
MCHC RBC-ENTMCNC: 31.4 GM/DL — LOW (ref 32–36)
MCV RBC AUTO: 79.7 FL — LOW (ref 80–100)
NITRITE UR-MCNC: NEGATIVE — SIGNIFICANT CHANGE UP
NRBC # BLD: 0 /100 WBCS — SIGNIFICANT CHANGE UP (ref 0–0)
PH UR: 8 — SIGNIFICANT CHANGE UP (ref 5–8)
PHOSPHATE SERPL-MCNC: 3.1 MG/DL — SIGNIFICANT CHANGE UP (ref 2.5–4.5)
PLATELET # BLD AUTO: 244 K/UL — SIGNIFICANT CHANGE UP (ref 150–400)
POTASSIUM SERPL-MCNC: 3.7 MMOL/L — SIGNIFICANT CHANGE UP (ref 3.5–5.3)
POTASSIUM SERPL-SCNC: 3.7 MMOL/L — SIGNIFICANT CHANGE UP (ref 3.5–5.3)
PROT UR-MCNC: NEGATIVE MG/DL — SIGNIFICANT CHANGE UP
RBC # BLD: 4.43 M/UL — SIGNIFICANT CHANGE UP (ref 4.2–5.8)
RBC # FLD: 17.7 % — HIGH (ref 10.3–14.5)
RBC CASTS # UR COMP ASSIST: NEGATIVE /HPF — SIGNIFICANT CHANGE UP (ref 0–4)
SODIUM SERPL-SCNC: 138 MMOL/L — SIGNIFICANT CHANGE UP (ref 135–145)
SP GR SPEC: 1.01 — SIGNIFICANT CHANGE UP (ref 1.01–1.02)
UROBILINOGEN FLD QL: NEGATIVE MG/DL — SIGNIFICANT CHANGE UP
WBC # BLD: 3.15 K/UL — LOW (ref 3.8–10.5)
WBC # FLD AUTO: 3.15 K/UL — LOW (ref 3.8–10.5)
WBC UR QL: SIGNIFICANT CHANGE UP

## 2021-04-03 PROCEDURE — 99232 SBSQ HOSP IP/OBS MODERATE 35: CPT

## 2021-04-03 RX ADMIN — AMLODIPINE BESYLATE 10 MILLIGRAM(S): 2.5 TABLET ORAL at 06:11

## 2021-04-03 RX ADMIN — Medication 1 TABLET(S): at 11:45

## 2021-04-03 RX ADMIN — SODIUM CHLORIDE 100 MILLILITER(S): 9 INJECTION, SOLUTION INTRAVENOUS at 14:01

## 2021-04-03 RX ADMIN — Medication 1 MILLIGRAM(S): at 21:33

## 2021-04-03 RX ADMIN — ATORVASTATIN CALCIUM 20 MILLIGRAM(S): 80 TABLET, FILM COATED ORAL at 21:33

## 2021-04-03 RX ADMIN — Medication 650 MILLIGRAM(S): at 23:51

## 2021-04-03 RX ADMIN — Medication 650 MILLIGRAM(S): at 22:46

## 2021-04-03 RX ADMIN — CEFEPIME 100 MILLIGRAM(S): 1 INJECTION, POWDER, FOR SOLUTION INTRAMUSCULAR; INTRAVENOUS at 06:11

## 2021-04-03 RX ADMIN — PANTOPRAZOLE SODIUM 40 MILLIGRAM(S): 20 TABLET, DELAYED RELEASE ORAL at 06:11

## 2021-04-03 RX ADMIN — Medication 1.5 MILLIGRAM(S): at 10:53

## 2021-04-03 RX ADMIN — Medication 1.5 MILLIGRAM(S): at 06:11

## 2021-04-03 RX ADMIN — ENOXAPARIN SODIUM 40 MILLIGRAM(S): 100 INJECTION SUBCUTANEOUS at 11:45

## 2021-04-03 RX ADMIN — SODIUM CHLORIDE 100 MILLILITER(S): 9 INJECTION, SOLUTION INTRAVENOUS at 03:43

## 2021-04-03 RX ADMIN — Medication 1.5 MILLIGRAM(S): at 18:01

## 2021-04-03 RX ADMIN — Medication 1.5 MILLIGRAM(S): at 14:01

## 2021-04-03 RX ADMIN — Medication 1.5 MILLIGRAM(S): at 02:43

## 2021-04-03 RX ADMIN — Medication 1 MILLIGRAM(S): at 11:45

## 2021-04-03 RX ADMIN — Medication 100 MILLIGRAM(S): at 11:45

## 2021-04-03 RX ADMIN — CEFEPIME 100 MILLIGRAM(S): 1 INJECTION, POWDER, FOR SOLUTION INTRAMUSCULAR; INTRAVENOUS at 14:00

## 2021-04-04 LAB
ANION GAP SERPL CALC-SCNC: 8 MMOL/L — SIGNIFICANT CHANGE UP (ref 5–17)
BUN SERPL-MCNC: 11 MG/DL — SIGNIFICANT CHANGE UP (ref 7–23)
CALCIUM SERPL-MCNC: 9.5 MG/DL — SIGNIFICANT CHANGE UP (ref 8.5–10.1)
CHLORIDE SERPL-SCNC: 102 MMOL/L — SIGNIFICANT CHANGE UP (ref 96–108)
CO2 SERPL-SCNC: 28 MMOL/L — SIGNIFICANT CHANGE UP (ref 22–31)
CREAT SERPL-MCNC: 1.15 MG/DL — SIGNIFICANT CHANGE UP (ref 0.5–1.3)
GLUCOSE SERPL-MCNC: 85 MG/DL — SIGNIFICANT CHANGE UP (ref 70–99)
HCT VFR BLD CALC: 38.6 % — LOW (ref 39–50)
HGB BLD-MCNC: 12 G/DL — LOW (ref 13–17)
MAGNESIUM SERPL-MCNC: 2 MG/DL — SIGNIFICANT CHANGE UP (ref 1.6–2.6)
MCHC RBC-ENTMCNC: 25.2 PG — LOW (ref 27–34)
MCHC RBC-ENTMCNC: 31.1 GM/DL — LOW (ref 32–36)
MCV RBC AUTO: 80.9 FL — SIGNIFICANT CHANGE UP (ref 80–100)
NRBC # BLD: 0 /100 WBCS — SIGNIFICANT CHANGE UP (ref 0–0)
PHOSPHATE SERPL-MCNC: 3.8 MG/DL — SIGNIFICANT CHANGE UP (ref 2.5–4.5)
PLATELET # BLD AUTO: 254 K/UL — SIGNIFICANT CHANGE UP (ref 150–400)
POTASSIUM SERPL-MCNC: 3.5 MMOL/L — SIGNIFICANT CHANGE UP (ref 3.5–5.3)
POTASSIUM SERPL-SCNC: 3.5 MMOL/L — SIGNIFICANT CHANGE UP (ref 3.5–5.3)
RBC # BLD: 4.77 M/UL — SIGNIFICANT CHANGE UP (ref 4.2–5.8)
RBC # FLD: 17.6 % — HIGH (ref 10.3–14.5)
SODIUM SERPL-SCNC: 138 MMOL/L — SIGNIFICANT CHANGE UP (ref 135–145)
WBC # BLD: 3.57 K/UL — LOW (ref 3.8–10.5)
WBC # FLD AUTO: 3.57 K/UL — LOW (ref 3.8–10.5)

## 2021-04-04 PROCEDURE — 99232 SBSQ HOSP IP/OBS MODERATE 35: CPT

## 2021-04-04 RX ORDER — MAGNESIUM OXIDE 400 MG ORAL TABLET 241.3 MG
1 TABLET ORAL
Qty: 0 | Refills: 0 | DISCHARGE

## 2021-04-04 RX ADMIN — ENOXAPARIN SODIUM 40 MILLIGRAM(S): 100 INJECTION SUBCUTANEOUS at 11:17

## 2021-04-04 RX ADMIN — Medication 1 MILLIGRAM(S): at 02:08

## 2021-04-04 RX ADMIN — ATORVASTATIN CALCIUM 20 MILLIGRAM(S): 80 TABLET, FILM COATED ORAL at 21:27

## 2021-04-04 RX ADMIN — Medication 1 MILLIGRAM(S): at 07:00

## 2021-04-04 RX ADMIN — PANTOPRAZOLE SODIUM 40 MILLIGRAM(S): 20 TABLET, DELAYED RELEASE ORAL at 07:00

## 2021-04-04 RX ADMIN — Medication 1 MILLIGRAM(S): at 11:17

## 2021-04-04 RX ADMIN — Medication 100 MILLIGRAM(S): at 11:17

## 2021-04-04 RX ADMIN — Medication 650 MILLIGRAM(S): at 21:27

## 2021-04-04 RX ADMIN — Medication 1 TABLET(S): at 11:17

## 2021-04-04 RX ADMIN — Medication 25 MILLIGRAM(S): at 17:33

## 2021-04-05 ENCOUNTER — TRANSCRIPTION ENCOUNTER (OUTPATIENT)
Age: 56
End: 2021-04-05

## 2021-04-05 VITALS
RESPIRATION RATE: 18 BRPM | HEART RATE: 75 BPM | OXYGEN SATURATION: 97 % | DIASTOLIC BLOOD PRESSURE: 81 MMHG | SYSTOLIC BLOOD PRESSURE: 120 MMHG | TEMPERATURE: 98 F

## 2021-04-05 LAB
HCT VFR BLD CALC: 35.1 % — LOW (ref 39–50)
HGB BLD-MCNC: 11.2 G/DL — LOW (ref 13–17)
MCHC RBC-ENTMCNC: 25.4 PG — LOW (ref 27–34)
MCHC RBC-ENTMCNC: 31.9 GM/DL — LOW (ref 32–36)
MCV RBC AUTO: 79.6 FL — LOW (ref 80–100)
NRBC # BLD: 0 /100 WBCS — SIGNIFICANT CHANGE UP (ref 0–0)
PLATELET # BLD AUTO: 224 K/UL — SIGNIFICANT CHANGE UP (ref 150–400)
RBC # BLD: 4.41 M/UL — SIGNIFICANT CHANGE UP (ref 4.2–5.8)
RBC # FLD: 17.7 % — HIGH (ref 10.3–14.5)
WBC # BLD: 3.5 K/UL — LOW (ref 3.8–10.5)
WBC # FLD AUTO: 3.5 K/UL — LOW (ref 3.8–10.5)

## 2021-04-05 PROCEDURE — 99239 HOSP IP/OBS DSCHRG MGMT >30: CPT

## 2021-04-05 RX ADMIN — Medication 100 MILLIGRAM(S): at 11:33

## 2021-04-05 RX ADMIN — ENOXAPARIN SODIUM 40 MILLIGRAM(S): 100 INJECTION SUBCUTANEOUS at 11:33

## 2021-04-05 RX ADMIN — Medication 1 MILLIGRAM(S): at 11:33

## 2021-04-05 RX ADMIN — Medication 1 TABLET(S): at 11:33

## 2021-04-05 RX ADMIN — Medication 25 MILLIGRAM(S): at 05:48

## 2021-04-05 RX ADMIN — AMLODIPINE BESYLATE 10 MILLIGRAM(S): 2.5 TABLET ORAL at 05:51

## 2021-04-05 NOTE — PROGRESS NOTE ADULT - ASSESSMENT
56 yo M with a history of chronic ETOH abuse w/ DTs with frequent hospital admissions, HLD, alcoholic gastritis/esophagitis/GERD/PUD admitted for alcohol abuse and withdrawal.    # Hx of Alcohol use w/ withdrawal - CIWA protocol.  Counselled on cessation.  CIWA 2.  Symptoms controlled.  D/c home.   # Essential HTN - Monitor BP.  Continue oral antihypertensives.  # Hyperlipidemia - diet modification.  Continue Statin.  # Lactic Acidosis - Resolved.  UA unimpressive.  CXR unimpressive.  No fever nor s/s of infection.  d/c antibiotics and observe.   # GERD - PPI  # DVT Prophylaxis - Lovenox subcut    Stable for d/c home.  
54 yo M with a history of chronic ETOH abuse w/ DTs with frequent hospital admissions, HLD, alcoholic gastritis/esophagitis/GERD/PUD admitted for alcohol abuse and withdrawal.    # Hx of Alcohol use w/ withdrawal - continue Ativan taper, CIWA protocol.  Counselled on cessation.  CIWA 3-6  # Essential HTN - Monitor BP.  Continue oral antihypertensives.  # Hyperlipidemia - diet modification.  Continue Statin.  # Lactic Acidosis - Resolved.  UA unimpressive.  CXR unimpressive.  No fever nor s/s of infection.  d/c antibiotics and observe.   # GERD - PPI  # DVT Prophylaxis - Lovenox subcut  
54 yo M with a history of chronic ETOH abuse w/ DTs with frequent hospital admissions, HLD, alcoholic gastritis/esophagitis/GERD/PUD admitted for alcohol abuse and withdrawal.    # Hx of Alcohol use w/ withdrawal - continue Ativan taper, CIWA protocol.  Counselled on cessation.  # Essential HTN - Monitor BP.  Continue oral antihypertensives.  # Hyperlipidemia - diet modification.  Continue Statin.  # Lactic Acidosis - Started empirically on Cefepime.  CXR unremarkable.  Continue for now.  Repeat CXR, urinalysis.   # GERD - PPI  # DVT Prophylaxis - Lovenox subcut  
56 yo M with a history of chronic ETOH abuse w/ DTs with frequent hospital admissions, HLD, alcoholic gastritis/esophagitis/GERD/PUD admitted for alcohol abuse and withdrawal.    # Hx of Alcohol use w/ withdrawal - CIWA protocol.  Counselled on cessation.  CIWA 2.  Change to po Librium.    # Essential HTN - Monitor BP.  Continue oral antihypertensives.  # Hyperlipidemia - diet modification.  Continue Statin.  # Lactic Acidosis - Resolved.  UA unimpressive.  CXR unimpressive.  No fever nor s/s of infection.  d/c antibiotics and observe.   # GERD - PPI  # DVT Prophylaxis - Lovenox subcut    Likely d/c 4/5 if symptoms controlled.

## 2021-04-05 NOTE — DISCHARGE NOTE NURSING/CASE MANAGEMENT/SOCIAL WORK - PATIENT PORTAL LINK FT
You can access the FollowMyHealth Patient Portal offered by [facility] by registering at the following website: [email or web address]. By joining [email or web address]’s FollowMyHealth portal, you will also be able to view your health information using other applications (apps) compatible with our system.

## 2021-04-05 NOTE — DISCHARGE NOTE PROVIDER - HOSPITAL COURSE
54 yo M with a history of chronic ETOH abuse w/ DTs with frequent hospital admissions, HLD, alcoholic gastritis/esophagitis/GERD/PUD admitted for alcohol abuse and withdrawal.    # Hx of Alcohol use w/ withdrawal - CIWA protocol.  Counselled on cessation.  CIWA 2.  Symptoms controlled.  Tandem gait.  D/c home.   # Essential HTN - Monitor BP.  Continue oral antihypertensives.  # Hyperlipidemia - diet modification.  Continue Statin.  # Lactic Acidosis - Resolved.  UA unimpressive.  CXR unimpressive.  No fever nor s/s of infection.  d/c antibiotics and observe.   # GERD - PPI  Stable for d/c home.    Disposition: Stable for discharge.  Outpatient followup discussed.  Total time spent on discharge is  35  minutes.

## 2021-04-05 NOTE — DISCHARGE NOTE PROVIDER - NSDCFUADDINST_GEN_ALL_CORE_FT
It is important to see your primary physician as well as the physicians noted below within the next week to perform a comprehensive medical review.  Call their offices for an appointment as soon as you leave the hospital.  If you do not have a primary physician, contact the [facility] Physician Referral Service at [phone].  To obtain your results, you can access the [facility] at [email or web address].  Your medical issues appear to be stable at this time, but if your symptoms recur or worsen, contact your physicians and/or return to the hospital if necessary.  If you encounter any issues or questions with your medication, call your physicians before stopping the medication.  Do not drive.

## 2021-04-06 LAB
CULTURE RESULTS: SIGNIFICANT CHANGE UP
SPECIMEN SOURCE: SIGNIFICANT CHANGE UP

## 2021-04-11 DIAGNOSIS — F10.229 ALCOHOL DEPENDENCE WITH INTOXICATION, UNSPECIFIED: ICD-10-CM

## 2021-04-11 DIAGNOSIS — F10.239 ALCOHOL DEPENDENCE WITH WITHDRAWAL, UNSPECIFIED: ICD-10-CM

## 2021-04-11 DIAGNOSIS — E86.0 DEHYDRATION: ICD-10-CM

## 2021-04-11 DIAGNOSIS — E78.5 HYPERLIPIDEMIA, UNSPECIFIED: ICD-10-CM

## 2021-04-11 DIAGNOSIS — K21.9 GASTRO-ESOPHAGEAL REFLUX DISEASE WITHOUT ESOPHAGITIS: ICD-10-CM

## 2021-04-11 DIAGNOSIS — I10 ESSENTIAL (PRIMARY) HYPERTENSION: ICD-10-CM

## 2021-04-11 DIAGNOSIS — E44.0 MODERATE PROTEIN-CALORIE MALNUTRITION: ICD-10-CM

## 2021-04-11 DIAGNOSIS — K20.90 ESOPHAGITIS, UNSPECIFIED WITHOUT BLEEDING: ICD-10-CM

## 2021-04-11 DIAGNOSIS — G89.29 OTHER CHRONIC PAIN: ICD-10-CM

## 2021-04-11 DIAGNOSIS — Z91.14 PATIENT'S OTHER NONCOMPLIANCE WITH MEDICATION REGIMEN: ICD-10-CM

## 2021-04-11 DIAGNOSIS — K29.20 ALCOHOLIC GASTRITIS WITHOUT BLEEDING: ICD-10-CM

## 2021-04-11 DIAGNOSIS — E87.2 ACIDOSIS: ICD-10-CM

## 2021-04-12 NOTE — ED PROVIDER NOTE - ADMIT DISPOSITION PRESENT ON ADMISSION SEPSIS
Group Therapy Progress Notes     Client Initial Individualized Goals for Treatment: Will report on symptoms and identify skills to use to manage anxiety and Will learn and practice 1-2 coping skills to help improve the symptoms and report back daily in group how you are practicing the skills    Area of Treatment Focus:  Symptom Stabilization and Management    Therapeutic Interventions/Treatment Strategies:  Engage in safety planning when indicated  Facilitate increased self awareness  Teach adaptive coping skills and communication skills    Response to Treatment Strategies:  Accepted Feedback, Listened , Focused on Goals and Accepted Support    Name of Groups:  Psychtherapy Wrap Up - Time: 2:00-3:00    Description and Therapeutic Outcome:   In psychotherapy wrap up group, Ayesha reviewed her take away from today - reviewed brain function and default/focused mode - knows she is in default mode all the time - discussed liking the concept of common humanity - good discussion around this.  Also discussed the first step being awareness and needing to be patient with herself as she starts to learn the new skills to interrupt herself from the default mode and start to learn to stay in focused mode.  Good first day for Ayesha - she engaged well, gave support and feedback to peers - will need to remember to focus on herself and not to default to helper mode in the program.  She did note that she was somewhat distracted today with increasing tooth pain - has an appointment on Wednesday morning with the dentist and is hoping she can make it until that time.  Is going to practice present moment tonight.  No safety issues noted.  Ayesha remains appropriate for PHP.      Is this a Weekly Review of the Progress on the Treatment Plan?  NO    Are Treatment Plan Goals being addressed?  YES      Are Treatment Plan Strategies to Address Goals Effective?  YES      Are there any current contracts in place?  YES              No

## 2021-04-12 NOTE — PROGRESS NOTE ADULT - PROBLEM SELECTOR PROBLEM 2
Problem: Fluid Volume Excess, Risk for  Goal: # Absence of Rapid Weight Gain (no more than 2kg in 24 hours)  Description: FVE Risk Patients may gain weight (but not more than 2 kg) but may not require aggressive treatment if in the absence of dyspnea; FVE (actual) patients should be monitored to achieve no weight gain.   Outcome: Outcome Met, Continue evaluating goal progress toward completion  Goal: # Absence of New Onset Dyspnea  Description: Dyspnea greater than SOB with Activity may be indicator of fluid volume excess  Outcome: Outcome Met, Continue evaluating goal progress toward completion  Goal: # Verbalizes understanding of FVE prevention plan  Description: Document on Patient Education Activity  Outcome: Outcome Met, Continue evaluating goal progress toward completion     Problem: Fluid Volume Excess  Goal: # Fluid Volume Excess Symptoms Resolved  Description: Treatment often consists of oxygen and respiratory support with diuretic therapy at doses that exceed usual dose (typically doubled).  Monitor patient response to treatment.    Acute dyspnea should resolve quickly if dose is adequate and kidney function is adequate. Dyspnea/SOB should only be observed with Activity after effective treatment. Patient should be able to lie down comfortably, without SOB.  Outcome: Outcome Met, Continue evaluating goal progress toward completion  Goal: # Oxygenation is maintained (SpO2 greater than or equal to 90% or as ordered)  Outcome: Outcome Met, Continue evaluating goal progress toward completion  Goal: # Verbalizes understanding of FVE management plan  Description: Document on Patient Education Activity  Outcome: Outcome Met, Continue evaluating goal progress toward completion     
Discussed with Dr. Rosado and he requested for SW to follow up with pt's daughter RE obtaining existing POA documents.    Spoke with pt's daughter, Danny ([phone]), via telephone. Introductions were made and SW role explained. Discussing obtaining HCPOA document and Danny ([email or web address]) reported she can email SW POA document tomorrow, as document is on her desktop. Emailed Danny and Danny confirmed she rec'd contact information to provide POA document.     Updated Dr. Rosado.    Tamie Cruz, [facility]  Medical Social Worker  [phone]  
Alcohol dependence with uncomplicated withdrawal
Aspiration pneumonia, unspecified aspiration pneumonia type, unspecified laterality, unspecified part of lung
Aspiration pneumonia, unspecified aspiration pneumonia type, unspecified laterality, unspecified part of lung
Alcohol dependence with withdrawal delirium

## 2021-04-14 NOTE — DISCHARGE NOTE PROVIDER - PROVIDER RX CONTACT NUMBER
[Risk of tobacco use and health benefits of smoking cessation discussed] : Risk of tobacco use and health benefits of smoking cessation discussed [Cessation strategies including cessation program discussed] : Cessation strategies including cessation program discussed [Use of nicotine replacement therapies and other medications discussed] : Use of nicotine replacement therapies and other medications discussed [Willing to Quit Smoking] : Willing to quit smoking [Tobacco Use Cessation Intermediate Greater Than 3 Minutes Up to 10 Minutes] : Tobacco Use Cessation Intermediate Greater Than 3 Minutes Up to 10 Minutes [FreeTextEntry1] : 5 [None] : None [Good understanding] : Patient has a good understanding of lifestyle changes and steps needed to achieve self management goal [phone]

## 2021-04-15 NOTE — ED PROVIDER NOTE - PROGRESS NOTE
Patient arrives via [place] with SOB that began this morning. COVID-19 shot last Friday, patient reports feeling \"sick\". Per medic, wheezing, 1 duo neb was given per medics. Hard of hearing, History of afib with RVR, HF, and COPD. Improved.

## 2021-04-23 ENCOUNTER — INPATIENT (INPATIENT)
Facility: HOSPITAL | Age: 56
LOS: 3 days | Discharge: ANOTHER HOSPITAL | End: 2021-04-27
Attending: STUDENT IN AN ORGANIZED HEALTH CARE EDUCATION/TRAINING PROGRAM | Admitting: FAMILY MEDICINE
Payer: MEDICAID

## 2021-04-23 VITALS
HEART RATE: 127 BPM | TEMPERATURE: 98 F | HEIGHT: 67 IN | WEIGHT: 160.06 LBS | SYSTOLIC BLOOD PRESSURE: 154 MMHG | RESPIRATION RATE: 16 BRPM | OXYGEN SATURATION: 98 % | DIASTOLIC BLOOD PRESSURE: 108 MMHG

## 2021-04-23 DIAGNOSIS — F10.230 ALCOHOL DEPENDENCE WITH WITHDRAWAL, UNCOMPLICATED: ICD-10-CM

## 2021-04-23 DIAGNOSIS — E87.2 ACIDOSIS: ICD-10-CM

## 2021-04-23 DIAGNOSIS — K29.20 ALCOHOLIC GASTRITIS WITHOUT BLEEDING: ICD-10-CM

## 2021-04-23 LAB
ALBUMIN SERPL ELPH-MCNC: 4.4 G/DL — SIGNIFICANT CHANGE UP (ref 3.3–5)
ALP SERPL-CCNC: 58 U/L — SIGNIFICANT CHANGE UP (ref 40–120)
ALT FLD-CCNC: 30 U/L — SIGNIFICANT CHANGE UP (ref 12–78)
ANION GAP SERPL CALC-SCNC: 17 MMOL/L — SIGNIFICANT CHANGE UP (ref 5–17)
AST SERPL-CCNC: 41 U/L — HIGH (ref 15–37)
BILIRUB SERPL-MCNC: 0.6 MG/DL — SIGNIFICANT CHANGE UP (ref 0.2–1.2)
BLD GP AB SCN SERPL QL: SIGNIFICANT CHANGE UP
BUN SERPL-MCNC: 16 MG/DL — SIGNIFICANT CHANGE UP (ref 7–23)
CALCIUM SERPL-MCNC: 9.9 MG/DL — SIGNIFICANT CHANGE UP (ref 8.5–10.1)
CHLORIDE SERPL-SCNC: 96 MMOL/L — SIGNIFICANT CHANGE UP (ref 96–108)
CO2 SERPL-SCNC: 26 MMOL/L — SIGNIFICANT CHANGE UP (ref 22–31)
CREAT SERPL-MCNC: 1.16 MG/DL — SIGNIFICANT CHANGE UP (ref 0.5–1.3)
ETHANOL SERPL-MCNC: 296 MG/DL — HIGH (ref 0–10)
GLUCOSE BLDC GLUCOMTR-MCNC: 117 MG/DL — HIGH (ref 70–99)
GLUCOSE SERPL-MCNC: 110 MG/DL — HIGH (ref 70–99)
HCT VFR BLD CALC: 40.7 % — SIGNIFICANT CHANGE UP (ref 39–50)
HGB BLD-MCNC: 13 G/DL — SIGNIFICANT CHANGE UP (ref 13–17)
LACTATE SERPL-SCNC: 6.6 MMOL/L — CRITICAL HIGH (ref 0.7–2)
LACTATE SERPL-SCNC: 9 MMOL/L — CRITICAL HIGH (ref 0.7–2)
LIDOCAIN IGE QN: 153 U/L — SIGNIFICANT CHANGE UP (ref 73–393)
MCHC RBC-ENTMCNC: 24.8 PG — LOW (ref 27–34)
MCHC RBC-ENTMCNC: 31.9 GM/DL — LOW (ref 32–36)
MCV RBC AUTO: 77.5 FL — LOW (ref 80–100)
NRBC # BLD: 0 /100 WBCS — SIGNIFICANT CHANGE UP (ref 0–0)
PLATELET # BLD AUTO: 365 K/UL — SIGNIFICANT CHANGE UP (ref 150–400)
POTASSIUM SERPL-MCNC: 3.6 MMOL/L — SIGNIFICANT CHANGE UP (ref 3.5–5.3)
POTASSIUM SERPL-SCNC: 3.6 MMOL/L — SIGNIFICANT CHANGE UP (ref 3.5–5.3)
PROT SERPL-MCNC: 9.7 GM/DL — HIGH (ref 6–8.3)
RBC # BLD: 5.25 M/UL — SIGNIFICANT CHANGE UP (ref 4.2–5.8)
RBC # FLD: 17.8 % — HIGH (ref 10.3–14.5)
SODIUM SERPL-SCNC: 139 MMOL/L — SIGNIFICANT CHANGE UP (ref 135–145)
WBC # BLD: 7.21 K/UL — SIGNIFICANT CHANGE UP (ref 3.8–10.5)
WBC # FLD AUTO: 7.21 K/UL — SIGNIFICANT CHANGE UP (ref 3.8–10.5)

## 2021-04-23 PROCEDURE — 74177 CT ABD & PELVIS W/CONTRAST: CPT | Mod: 26,MA

## 2021-04-23 PROCEDURE — 99222 1ST HOSP IP/OBS MODERATE 55: CPT

## 2021-04-23 PROCEDURE — 99285 EMERGENCY DEPT VISIT HI MDM: CPT

## 2021-04-23 PROCEDURE — 71045 X-RAY EXAM CHEST 1 VIEW: CPT | Mod: 26

## 2021-04-23 PROCEDURE — 93010 ELECTROCARDIOGRAM REPORT: CPT

## 2021-04-23 RX ORDER — HALOPERIDOL DECANOATE 100 MG/ML
2.5 INJECTION INTRAMUSCULAR ONCE
Refills: 0 | Status: COMPLETED | OUTPATIENT
Start: 2021-04-23 | End: 2021-04-23

## 2021-04-23 RX ORDER — ATORVASTATIN CALCIUM 80 MG/1
20 TABLET, FILM COATED ORAL AT BEDTIME
Refills: 0 | Status: DISCONTINUED | OUTPATIENT
Start: 2021-04-23 | End: 2021-04-27

## 2021-04-23 RX ORDER — OCTREOTIDE ACETATE 200 UG/ML
50 INJECTION, SOLUTION INTRAVENOUS; SUBCUTANEOUS ONCE
Refills: 0 | Status: COMPLETED | OUTPATIENT
Start: 2021-04-23 | End: 2021-04-23

## 2021-04-23 RX ORDER — SODIUM CHLORIDE 9 MG/ML
2000 INJECTION, SOLUTION INTRAVENOUS
Refills: 0 | Status: DISCONTINUED | OUTPATIENT
Start: 2021-04-23 | End: 2021-04-26

## 2021-04-23 RX ORDER — ONDANSETRON 8 MG/1
8 TABLET, FILM COATED ORAL ONCE
Refills: 0 | Status: COMPLETED | OUTPATIENT
Start: 2021-04-23 | End: 2021-04-23

## 2021-04-23 RX ORDER — PANTOPRAZOLE SODIUM 20 MG/1
40 TABLET, DELAYED RELEASE ORAL
Refills: 0 | Status: DISCONTINUED | OUTPATIENT
Start: 2021-04-23 | End: 2021-04-26

## 2021-04-23 RX ORDER — SODIUM CHLORIDE 9 MG/ML
2000 INJECTION INTRAMUSCULAR; INTRAVENOUS; SUBCUTANEOUS ONCE
Refills: 0 | Status: COMPLETED | OUTPATIENT
Start: 2021-04-23 | End: 2021-04-23

## 2021-04-23 RX ORDER — THIAMINE MONONITRATE (VIT B1) 100 MG
100 TABLET ORAL DAILY
Refills: 0 | Status: DISCONTINUED | OUTPATIENT
Start: 2021-04-23 | End: 2021-04-26

## 2021-04-23 RX ORDER — KETOROLAC TROMETHAMINE 30 MG/ML
30 SYRINGE (ML) INJECTION ONCE
Refills: 0 | Status: DISCONTINUED | OUTPATIENT
Start: 2021-04-23 | End: 2021-04-23

## 2021-04-23 RX ORDER — SODIUM CHLORIDE 9 MG/ML
1000 INJECTION INTRAMUSCULAR; INTRAVENOUS; SUBCUTANEOUS ONCE
Refills: 0 | Status: COMPLETED | OUTPATIENT
Start: 2021-04-23 | End: 2021-04-23

## 2021-04-23 RX ORDER — FOLIC ACID 0.8 MG
1 TABLET ORAL DAILY
Refills: 0 | Status: DISCONTINUED | OUTPATIENT
Start: 2021-04-23 | End: 2021-04-26

## 2021-04-23 RX ORDER — CEFTRIAXONE 500 MG/1
1000 INJECTION, POWDER, FOR SOLUTION INTRAMUSCULAR; INTRAVENOUS ONCE
Refills: 0 | Status: COMPLETED | OUTPATIENT
Start: 2021-04-23 | End: 2021-04-23

## 2021-04-23 RX ORDER — FAMOTIDINE 10 MG/ML
20 INJECTION INTRAVENOUS ONCE
Refills: 0 | Status: COMPLETED | OUTPATIENT
Start: 2021-04-23 | End: 2021-04-23

## 2021-04-23 RX ADMIN — Medication 4 MILLIGRAM(S): at 23:59

## 2021-04-23 RX ADMIN — SODIUM CHLORIDE 1000 MILLILITER(S): 9 INJECTION INTRAMUSCULAR; INTRAVENOUS; SUBCUTANEOUS at 22:07

## 2021-04-23 RX ADMIN — SODIUM CHLORIDE 125 MILLILITER(S): 9 INJECTION, SOLUTION INTRAVENOUS at 23:31

## 2021-04-23 RX ADMIN — Medication 30 MILLIGRAM(S): at 22:35

## 2021-04-23 RX ADMIN — HALOPERIDOL DECANOATE 2.5 MILLIGRAM(S): 100 INJECTION INTRAMUSCULAR at 17:26

## 2021-04-23 RX ADMIN — FAMOTIDINE 20 MILLIGRAM(S): 10 INJECTION INTRAVENOUS at 16:18

## 2021-04-23 RX ADMIN — Medication 30 MILLILITER(S): at 18:24

## 2021-04-23 RX ADMIN — SODIUM CHLORIDE 2000 MILLILITER(S): 9 INJECTION INTRAMUSCULAR; INTRAVENOUS; SUBCUTANEOUS at 16:19

## 2021-04-23 RX ADMIN — CEFTRIAXONE 100 MILLIGRAM(S): 500 INJECTION, POWDER, FOR SOLUTION INTRAMUSCULAR; INTRAVENOUS at 16:35

## 2021-04-23 RX ADMIN — Medication 2 MILLIGRAM(S): at 18:19

## 2021-04-23 RX ADMIN — Medication 30 MILLIGRAM(S): at 23:46

## 2021-04-23 RX ADMIN — OCTREOTIDE ACETATE 50 MICROGRAM(S): 200 INJECTION, SOLUTION INTRAVENOUS; SUBCUTANEOUS at 17:02

## 2021-04-23 RX ADMIN — Medication 50 MILLIGRAM(S): at 22:05

## 2021-04-23 RX ADMIN — ONDANSETRON 8 MILLIGRAM(S): 8 TABLET, FILM COATED ORAL at 16:18

## 2021-04-23 NOTE — ED ADULT NURSE NOTE - CHIEF COMPLAINT QUOTE
57 y/o male with PMH of HTN & GERD. BIBA with c/c of black, coffee ground emesis after drinking 1.5 bottles of vodka. Last drink 1 hour ago.

## 2021-04-23 NOTE — H&P ADULT - PROBLEM SELECTOR PLAN 1
CECILIA currently 2, recently received ativan  Will continue withdrawal protocol with Ativan 4mg IVP standing  Ativan 4 mg IVP PRN for acute withdrawal symptoms  Please taper slowly as patient has significant history of DTs  Thiamine, Folate, MVI daily

## 2021-04-23 NOTE — H&P ADULT - NSHPLABSRESULTS_GEN_ALL_CORE
Recent Vitals  T(C): 37.2 (04-23-21 @ 19:20), Max: 37.2 (04-23-21 @ 19:20)  HR: 106 (04-23-21 @ 19:20) (100 - 127)  BP: 110/71 (04-23-21 @ 19:20) (107/67 - 154/108)  RR: 18 (04-23-21 @ 19:20) (16 - 18)  SpO2: 95% (04-23-21 @ 19:20) (95% - 98%)                        13.0   7.21  )-----------( 365      ( 23 Apr 2021 16:23 )             40.7     04-23    139  |  96  |  16  ----------------------------<  110<H>  3.6   |  26  |  1.16    Ca    9.9      23 Apr 2021 16:23    TPro  9.7<H>  /  Alb  4.4  /  TBili  0.6  /  DBili  x   /  AST  41<H>  /  ALT  30  /  AlkPhos  58  04-23      LIVER FUNCTIONS - ( 23 Apr 2021 16:23 )  Alb: 4.4 g/dL / Pro: 9.7 gm/dL / ALK PHOS: 58 U/L / ALT: 30 U/L / AST: 41 U/L / GGT: x               Home Medications:  atorvastatin 20 mg oral tablet: 1 tab(s) orally once a day (at bedtime) (24 Mar 2021 11:58)  Protonix 40 mg oral delayed release tablet: 1 tab(s) orally once a day (03 Mar 2021 17:32)

## 2021-04-23 NOTE — ED PROVIDER NOTE - MUSCULOSKELETAL, MLM
Spine appears normal, range of motion is not limited, no muscle or joint tenderness Spine appears normal, range of motion is not limited, no muscle or joint tenderness. intention tremor on bilateral hands

## 2021-04-23 NOTE — ED PROVIDER NOTE - ENMT, MLM
Airway patent, Nasal mucosa clear. Mouth with normal mucosa Airway patent, Nasal mucosa clear. Mouth with normal mucosa other than tongue fasciculations

## 2021-04-23 NOTE — H&P ADULT - ASSESSMENT
Patient is a 56M with a PMH of chronic ETOH abuse with hx of alcohol withdrawal and DTs with frequent hospital admissions, alcoholic gastritis/esophagitis, PUD, HLD, hx of hypoxic respiratory failure requiring intubation due to aspiration PNA who presents to the ED for abdominal pain and vomiting.  Patient reports severe abdominal pain for the last three days with vomiting.  Noted to have brown vomitus in ED with concern for variceal bleeding.  Patient states that his last drink was three days ago.  Tachycardic in ED to 127.  Labs show significant lactic acidosis.  Blood alcohol level of 296.  Patient reportedly withdrawing in the ED and was started on benzodiazepines.  CIWA currently 0.  Will admit patient to tele (recent prolonged hospital stay with ICU visit due to uncontrolled DTs).

## 2021-04-23 NOTE — ED ADULT TRIAGE NOTE - CHIEF COMPLAINT QUOTE
55 y/o male with PMH of HTN & GERD. BIBA with c/c of vomiting after drinking 1.5 bottles of vodka. Last drink 1 hour ago. 57 y/o male with PMH of HTN & GERD. BIBA with c/c of black, coffee ground emesis after drinking 1.5 bottles of vodka. Last drink 1 hour ago.

## 2021-04-23 NOTE — SBIRT NOTE ADULT - NSSBIRTUNABLESCROTHER_GEN_A_CORE
Pt stated his throat was in too much pain to talk. HC will reattempt.  2nd attempt made. Pt insistent that he is only drinking one day a week and that his pain/symptoms aren't related to his alcohol intake. States it is from not taking his medications. Not receptive to conversation about alcohol intake/tx

## 2021-04-23 NOTE — ED PROVIDER NOTE - CONSTITUTIONAL, MLM
normal... uncomfortable appearing, retching, awake, alert, oriented to person, place, time/situation

## 2021-04-23 NOTE — ED PROVIDER NOTE - OBJECTIVE STATEMENT
ETOH abuse w/ DTs with frequent hospital admissions, HLD, alcoholic gastritis/esophagitis/GERD/PUD & hx of hypoxic respiratory failure requiring intubation due to aspiration PNA; ETOH abuse w/ DTs with frequent hospital admissions, HLD, alcoholic gastritis/esophagitis/GERD/PUD & hx of hypoxic respiratory failure requiring intubation due to aspiration PNA presenting for abdo pain and vomiting for 1 day. lewis antonio historian, appears intoxicated, but denies drinking.

## 2021-04-23 NOTE — H&P ADULT - PROBLEM/PLAN-1
DISPLAY PLAN FREE TEXT Closure 2 Information: This tab is for additional flaps and grafts, including complex repair and grafts and complex repair and flaps. You can also specify a different location for the additional defect, if the location is the same you do not need to select a new one. We will insert the automated text for the repair you select below just as we do for solitary flaps and grafts. Please note that at this time if you select a location with a different insurance zone you will need to override the ICD10 and CPT if appropriate.

## 2021-04-23 NOTE — ED PROVIDER NOTE - CLINICAL SUMMARY MEDICAL DECISION MAKING FREE TEXT BOX
patinet vomiting in ED with bron vomitus without bright red blood. patinet with history of gastritis. empirically treted for variceal bleed though unlikely as no red blood. lacta elevated; CT scan obtained to eval for intrabdominal phathology, CXR without free air indicative of perforation. haldol amdinsitered for additional nausea control with improvement. etoh elevated, mild withdrawal symptoms, valium administered for relief. patinet vomiting in ED with bron vomitus without bright red blood. patinet with history of gastritis. empirically treated for variceal bleed though unlikely as no red blood. lacta elevated; CT scan obtained to eval for intrabdominal phathology, CXR without free air indicative of perforation. haldol administered for additional nausea control with improvement. etoh elevated, patient with tachycardia and psychomotor agitation indicative of etoh withdrawal ativan and librium admisnitered fo relief. lactate downtrending

## 2021-04-24 LAB
ALBUMIN SERPL ELPH-MCNC: 3.3 G/DL — SIGNIFICANT CHANGE UP (ref 3.3–5)
ALP SERPL-CCNC: 42 U/L — SIGNIFICANT CHANGE UP (ref 40–120)
ALT FLD-CCNC: 26 U/L — SIGNIFICANT CHANGE UP (ref 12–78)
ANION GAP SERPL CALC-SCNC: 6 MMOL/L — SIGNIFICANT CHANGE UP (ref 5–17)
AST SERPL-CCNC: 41 U/L — HIGH (ref 15–37)
BILIRUB SERPL-MCNC: 1 MG/DL — SIGNIFICANT CHANGE UP (ref 0.2–1.2)
BUN SERPL-MCNC: 17 MG/DL — SIGNIFICANT CHANGE UP (ref 7–23)
CALCIUM SERPL-MCNC: 8.3 MG/DL — LOW (ref 8.5–10.1)
CHLORIDE SERPL-SCNC: 105 MMOL/L — SIGNIFICANT CHANGE UP (ref 96–108)
CO2 SERPL-SCNC: 29 MMOL/L — SIGNIFICANT CHANGE UP (ref 22–31)
CREAT SERPL-MCNC: 1.09 MG/DL — SIGNIFICANT CHANGE UP (ref 0.5–1.3)
FLUAV AG NPH QL: SIGNIFICANT CHANGE UP
FLUBV AG NPH QL: SIGNIFICANT CHANGE UP
GLUCOSE SERPL-MCNC: 126 MG/DL — HIGH (ref 70–99)
HCT VFR BLD CALC: 32.6 % — LOW (ref 39–50)
HGB BLD-MCNC: 10.2 G/DL — LOW (ref 13–17)
MCHC RBC-ENTMCNC: 24.7 PG — LOW (ref 27–34)
MCHC RBC-ENTMCNC: 31.3 GM/DL — LOW (ref 32–36)
MCV RBC AUTO: 78.9 FL — LOW (ref 80–100)
NRBC # BLD: 0 /100 WBCS — SIGNIFICANT CHANGE UP (ref 0–0)
PLATELET # BLD AUTO: 214 K/UL — SIGNIFICANT CHANGE UP (ref 150–400)
POTASSIUM SERPL-MCNC: 3.8 MMOL/L — SIGNIFICANT CHANGE UP (ref 3.5–5.3)
POTASSIUM SERPL-SCNC: 3.8 MMOL/L — SIGNIFICANT CHANGE UP (ref 3.5–5.3)
PROT SERPL-MCNC: 7.3 GM/DL — SIGNIFICANT CHANGE UP (ref 6–8.3)
RBC # BLD: 4.13 M/UL — LOW (ref 4.2–5.8)
RBC # FLD: 17.4 % — HIGH (ref 10.3–14.5)
SARS-COV-2 RNA SPEC QL NAA+PROBE: SIGNIFICANT CHANGE UP
SODIUM SERPL-SCNC: 140 MMOL/L — SIGNIFICANT CHANGE UP (ref 135–145)
WBC # BLD: 3.27 K/UL — LOW (ref 3.8–10.5)
WBC # FLD AUTO: 3.27 K/UL — LOW (ref 3.8–10.5)

## 2021-04-24 PROCEDURE — 36000 PLACE NEEDLE IN VEIN: CPT

## 2021-04-24 PROCEDURE — 76937 US GUIDE VASCULAR ACCESS: CPT | Mod: 26

## 2021-04-24 PROCEDURE — 99233 SBSQ HOSP IP/OBS HIGH 50: CPT

## 2021-04-24 RX ORDER — SUCRALFATE 1 G
1 TABLET ORAL
Refills: 0 | Status: DISCONTINUED | OUTPATIENT
Start: 2021-04-24 | End: 2021-04-27

## 2021-04-24 RX ADMIN — PANTOPRAZOLE SODIUM 40 MILLIGRAM(S): 20 TABLET, DELAYED RELEASE ORAL at 06:27

## 2021-04-24 RX ADMIN — Medication 4 MILLIGRAM(S): at 19:57

## 2021-04-24 RX ADMIN — Medication 4 MILLIGRAM(S): at 22:57

## 2021-04-24 RX ADMIN — Medication 1 TABLET(S): at 11:17

## 2021-04-24 RX ADMIN — SODIUM CHLORIDE 125 MILLILITER(S): 9 INJECTION, SOLUTION INTRAVENOUS at 21:15

## 2021-04-24 RX ADMIN — Medication 4 MILLIGRAM(S): at 21:15

## 2021-04-24 RX ADMIN — Medication 100 MILLIGRAM(S): at 11:17

## 2021-04-24 RX ADMIN — Medication 4 MILLIGRAM(S): at 17:59

## 2021-04-24 RX ADMIN — Medication 4 MILLIGRAM(S): at 11:55

## 2021-04-24 RX ADMIN — Medication 1 MILLIGRAM(S): at 11:17

## 2021-04-24 RX ADMIN — Medication 4 MILLIGRAM(S): at 03:43

## 2021-04-24 RX ADMIN — ATORVASTATIN CALCIUM 20 MILLIGRAM(S): 80 TABLET, FILM COATED ORAL at 21:14

## 2021-04-24 RX ADMIN — Medication 4 MILLIGRAM(S): at 06:26

## 2021-04-24 RX ADMIN — Medication 1 GRAM(S): at 17:59

## 2021-04-24 NOTE — CHART NOTE - NSCHARTNOTEFT_GEN_A_CORE
Medicine Hospitalist PA    Pt had IV placed earlier which blew. Placed peripheral IV 20g in Right EJ using ultrasound guidance. Blood return noted, flushes well, no complications. RN aware.

## 2021-04-24 NOTE — CHART NOTE - NSCHARTNOTEFT_GEN_A_CORE
Medicine Hospitalist PA    Requested by RN to place an IV heplock in patient. As per RN pt is a difficult stick. Placed IV heplock in left antecub. #20g using ultrasound guidance. IV flushed and secured. RN aware.

## 2021-04-24 NOTE — PROGRESS NOTE ADULT - ASSESSMENT
Patient is a 56M with a PMH of chronic ETOH abuse with hx of alcohol withdrawal and DTs with frequent hospital admissions, alcoholic gastritis/esophagitis, PUD, HLD, hx of hypoxic respiratory failure requiring intubation due to aspiration PNA who presents to the ED for abdominal pain and vomiting.  Patient reports severe abdominal pain for the last three days with vomiting.  Noted to have brown vomitus in ED with concern for variceal bleeding.  Patient states that his last drink was three days ago.  Tachycardic in ED to 127.  Labs show significant lactic acidosis.  Blood alcohol level of 296.  Patient reportedly withdrawing in the ED and was started on benzodiazepines.  CIWA currently 0.  Will admit patient to tele (recent prolonged hospital stay with ICU visit due to uncontrolled DTs).     Alcohol withdrawal syndrome without complication.    -continue CIWA protocol  Ativan 4 mg IVP PRN for acute withdrawal symptoms  Please taper slowly as patient has significant history of DTs  Thiamine, Folate, MVI daily.      Chronic alcoholic gastritis without hemorrhage.  Plan: toradol prn, ppi.     Lactic acidosis.  Plan: Will hydrate, monitor lactate.   :   R/O Hematemesis without nausea.  Plan: H/H stable, hematemesis unlikely  will continue to monitor.     DVT ppx        Patient is a 56M with a PMH of chronic ETOH abuse with hx of alcohol withdrawal and DTs with frequent hospital admissions, alcoholic gastritis/esophagitis, PUD, HLD, hx of hypoxic respiratory failure requiring intubation due to aspiration PNA who presents to the ED for abdominal pain and vomiting.  Patient reports severe abdominal pain for the last three days with vomiting.  Noted to have brown vomitus in ED with concern for variceal bleeding.  Patient states that his last drink was three days ago.  Tachycardic in ED to 127.  Labs show significant lactic acidosis.  Blood alcohol level of 296.  Patient reportedly withdrawing in the ED and was started on benzodiazepines.  CIWA currently 0.  Will admit patient to tele (recent prolonged hospital stay with ICU visit due to uncontrolled DTs).     Alcohol withdrawal syndrome without complication.    -continue CIWA protocol  Ativan 4 mg IVP PRN for acute withdrawal symptoms  Please taper slowly as patient has significant history of DTs  Thiamine, Folate, MVI daily.      Chronic alcoholic gastritis without hemorrhage.  Plan: toradol prn, ppi.     Lactic acidosis.  Plan: Will hydrate, monitor lactate.   :   R/O Hematemesis without nausea.  Plan: H/H stable, hematemesis unlikely  will continue to monitor.     Swallowing difficulties- changed diet to soft, continue to monitor  Official speech and swallow eval pending    DVT ppx

## 2021-04-25 LAB
ANION GAP SERPL CALC-SCNC: 7 MMOL/L — SIGNIFICANT CHANGE UP (ref 5–17)
BUN SERPL-MCNC: 7 MG/DL — SIGNIFICANT CHANGE UP (ref 7–23)
CALCIUM SERPL-MCNC: 8.9 MG/DL — SIGNIFICANT CHANGE UP (ref 8.5–10.1)
CHLORIDE SERPL-SCNC: 106 MMOL/L — SIGNIFICANT CHANGE UP (ref 96–108)
CO2 SERPL-SCNC: 28 MMOL/L — SIGNIFICANT CHANGE UP (ref 22–31)
COVID-19 SPIKE DOMAIN AB INTERP: POSITIVE
COVID-19 SPIKE DOMAIN ANTIBODY RESULT: 38.4 U/ML — HIGH
CREAT SERPL-MCNC: 0.81 MG/DL — SIGNIFICANT CHANGE UP (ref 0.5–1.3)
GLUCOSE BLDC GLUCOMTR-MCNC: 92 MG/DL — SIGNIFICANT CHANGE UP (ref 70–99)
GLUCOSE SERPL-MCNC: 94 MG/DL — SIGNIFICANT CHANGE UP (ref 70–99)
HCT VFR BLD CALC: 32 % — LOW (ref 39–50)
HGB BLD-MCNC: 10 G/DL — LOW (ref 13–17)
MAGNESIUM SERPL-MCNC: 2.1 MG/DL — SIGNIFICANT CHANGE UP (ref 1.6–2.6)
MCHC RBC-ENTMCNC: 24.9 PG — LOW (ref 27–34)
MCHC RBC-ENTMCNC: 31.3 GM/DL — LOW (ref 32–36)
MCV RBC AUTO: 79.8 FL — LOW (ref 80–100)
NRBC # BLD: 0 /100 WBCS — SIGNIFICANT CHANGE UP (ref 0–0)
PLATELET # BLD AUTO: 213 K/UL — SIGNIFICANT CHANGE UP (ref 150–400)
POTASSIUM SERPL-MCNC: 3.4 MMOL/L — LOW (ref 3.5–5.3)
POTASSIUM SERPL-SCNC: 3.4 MMOL/L — LOW (ref 3.5–5.3)
RBC # BLD: 4.01 M/UL — LOW (ref 4.2–5.8)
RBC # FLD: 17.1 % — HIGH (ref 10.3–14.5)
SARS-COV-2 IGG+IGM SERPL QL IA: 38.4 U/ML — HIGH
SARS-COV-2 IGG+IGM SERPL QL IA: POSITIVE
SODIUM SERPL-SCNC: 141 MMOL/L — SIGNIFICANT CHANGE UP (ref 135–145)
WBC # BLD: 3.89 K/UL — SIGNIFICANT CHANGE UP (ref 3.8–10.5)
WBC # FLD AUTO: 3.89 K/UL — SIGNIFICANT CHANGE UP (ref 3.8–10.5)

## 2021-04-25 PROCEDURE — 99233 SBSQ HOSP IP/OBS HIGH 50: CPT

## 2021-04-25 RX ORDER — CHLORHEXIDINE GLUCONATE 213 G/1000ML
1 SOLUTION TOPICAL
Refills: 0 | Status: DISCONTINUED | OUTPATIENT
Start: 2021-04-25 | End: 2021-04-27

## 2021-04-25 RX ORDER — PHENOBARBITAL 60 MG
130 TABLET ORAL THREE TIMES A DAY
Refills: 0 | Status: DISCONTINUED | OUTPATIENT
Start: 2021-04-25 | End: 2021-04-25

## 2021-04-25 RX ORDER — DEXMEDETOMIDINE HYDROCHLORIDE IN 0.9% SODIUM CHLORIDE 4 UG/ML
0.2 INJECTION INTRAVENOUS
Qty: 200 | Refills: 0 | Status: DISCONTINUED | OUTPATIENT
Start: 2021-04-25 | End: 2021-04-27

## 2021-04-25 RX ORDER — PHENOBARBITAL 60 MG
130 TABLET ORAL ONCE
Refills: 0 | Status: DISCONTINUED | OUTPATIENT
Start: 2021-04-25 | End: 2021-04-25

## 2021-04-25 RX ORDER — PHENOBARBITAL 60 MG
130 TABLET ORAL THREE TIMES A DAY
Refills: 0 | Status: DISCONTINUED | OUTPATIENT
Start: 2021-04-25 | End: 2021-04-27

## 2021-04-25 RX ORDER — ENOXAPARIN SODIUM 100 MG/ML
40 INJECTION SUBCUTANEOUS DAILY
Refills: 0 | Status: DISCONTINUED | OUTPATIENT
Start: 2021-04-25 | End: 2021-04-27

## 2021-04-25 RX ORDER — PHENOBARBITAL 60 MG
130 TABLET ORAL EVERY 6 HOURS
Refills: 0 | Status: DISCONTINUED | OUTPATIENT
Start: 2021-04-25 | End: 2021-04-26

## 2021-04-25 RX ADMIN — Medication 4 MILLIGRAM(S): at 15:52

## 2021-04-25 RX ADMIN — Medication 4 MILLIGRAM(S): at 18:05

## 2021-04-25 RX ADMIN — Medication 4 MILLIGRAM(S): at 14:50

## 2021-04-25 RX ADMIN — Medication 130 MILLIGRAM(S): at 09:39

## 2021-04-25 RX ADMIN — Medication 130 MILLIGRAM(S): at 21:28

## 2021-04-25 RX ADMIN — CHLORHEXIDINE GLUCONATE 1 APPLICATION(S): 213 SOLUTION TOPICAL at 23:21

## 2021-04-25 RX ADMIN — PANTOPRAZOLE SODIUM 40 MILLIGRAM(S): 20 TABLET, DELAYED RELEASE ORAL at 06:00

## 2021-04-25 RX ADMIN — Medication 4 MILLIGRAM(S): at 01:36

## 2021-04-25 RX ADMIN — Medication 1 TABLET(S): at 11:29

## 2021-04-25 RX ADMIN — Medication 130 MILLIGRAM(S): at 11:29

## 2021-04-25 RX ADMIN — Medication 4 MILLIGRAM(S): at 10:36

## 2021-04-25 RX ADMIN — Medication 130 MILLIGRAM(S): at 04:29

## 2021-04-25 RX ADMIN — Medication 130 MILLIGRAM(S): at 17:05

## 2021-04-25 RX ADMIN — DEXMEDETOMIDINE HYDROCHLORIDE IN 0.9% SODIUM CHLORIDE 3.63 MICROGRAM(S)/KG/HR: 4 INJECTION INTRAVENOUS at 23:00

## 2021-04-25 RX ADMIN — Medication 4 MILLIGRAM(S): at 20:27

## 2021-04-25 RX ADMIN — Medication 4 MILLIGRAM(S): at 07:46

## 2021-04-25 RX ADMIN — Medication 4 MILLIGRAM(S): at 03:01

## 2021-04-25 RX ADMIN — Medication 130 MILLIGRAM(S): at 23:25

## 2021-04-25 RX ADMIN — Medication 4 MILLIGRAM(S): at 05:55

## 2021-04-25 RX ADMIN — Medication 50 MILLIGRAM(S): at 11:59

## 2021-04-25 RX ADMIN — Medication 1 GRAM(S): at 17:04

## 2021-04-25 RX ADMIN — Medication 50 MILLIGRAM(S): at 19:47

## 2021-04-25 RX ADMIN — Medication 130 MILLIGRAM(S): at 13:15

## 2021-04-25 RX ADMIN — Medication 4 MILLIGRAM(S): at 00:18

## 2021-04-25 RX ADMIN — SODIUM CHLORIDE 125 MILLILITER(S): 9 INJECTION, SOLUTION INTRAVENOUS at 19:50

## 2021-04-25 RX ADMIN — Medication 100 MILLIGRAM(S): at 11:29

## 2021-04-25 RX ADMIN — ENOXAPARIN SODIUM 40 MILLIGRAM(S): 100 INJECTION SUBCUTANEOUS at 17:05

## 2021-04-25 RX ADMIN — Medication 1 GRAM(S): at 11:29

## 2021-04-25 RX ADMIN — Medication 1 MILLIGRAM(S): at 11:29

## 2021-04-25 RX ADMIN — Medication 1 GRAM(S): at 06:00

## 2021-04-25 RX ADMIN — Medication 1 GRAM(S): at 00:18

## 2021-04-25 NOTE — CONSULT NOTE ADULT - ASSESSMENT
57yo M with a PMH of chronic ETOH abuse with hx of alcohol withdrawal and DTs with frequent hospital admissions, alcoholic gastritis/esophagitis, PUD, HLD, hx of hypoxic respiratory failure requiring intubation due to aspiration PNA who presented to ED  on 4/23 for abdominal pain and vomiting.  Noted to have brown vomitus in ED. Admitted to tele for management of EtOH withdrawal. ICU re-consulted today s/p RRT for agitation and increasing CIWA.     -Neuro: Continue phenobarb regimen, will add precedex gtt.  -CV: Hemodynamically stable, BP slightly elevated; continue to monitor, may need antihypertensives.   -Pulm: Copious oropharyngeal secretions, managing secretions poorly; maintain aspiration precautions, frequent oral/NT suctioning, elevate HOB. NPO for now until formal speech & swallow eval.  -GI: NPO for now.  CT A/P shows diverticulosis w/o diverticulitis. No further coffee-ground emesis noted.   -Renal: Normal renal function; trend BUN/Cr, monitor UO.  -ID: No issues at present. Monitor temp curve, WBC count.  -Heme: No active issues, Hgb appears to be around baseline ~10, trend H/H.  -Endo: Monitor serum glucose.  -Dispo: Transfer to ICU  
56 year old with multiple admission for etoh abuse    -ciwa currently acceptable  -add librium  -monitor for further withdrawal  -does not require ICU level of care at this time  -discussed with bedside provider

## 2021-04-25 NOTE — PROGRESS NOTE ADULT - ASSESSMENT
Patient is a 56M with a PMH of chronic ETOH abuse with hx of alcohol withdrawal and DTs with frequent hospital admissions, alcoholic gastritis/esophagitis, PUD, HLD, hx of hypoxic respiratory failure requiring intubation due to aspiration PNA who presents to the ED for abdominal pain and vomiting.  Patient reports severe abdominal pain for the last three days with vomiting.  Noted to have brown vomitus in ED with concern for variceal bleeding.  Patient states that his last drink was three days ago.  Tachycardic in ED to 127.  Labs show significant lactic acidosis.  Blood alcohol level of 296.  Patient reportedly withdrawing in the ED and was started on benzodiazepines.  CIWA currently 0.  Will admit patient to tele (recent prolonged hospital stay with ICU visit due to uncontrolled DTs).     Alcohol withdrawal syndrome without complication.    -continue CIWA protocol-CIWA 16 today  -ICU consult appreciated  -on Phenobarbitol , with Librium and Ativan prn   Please taper slowly as patient has significant history of DTs  Thiamine, Folate, MVI daily.      Chronic alcoholic gastritis without hemorrhage.  Plan: toradol prn, ppi.     Lactic acidosis.  Plan: Will hydrate, monitor lactate.   :   R/O Hematemesis without nausea.  Plan: H/H stable, hematemesis unlikely  will continue to monitor.     Swallowing difficulties- changed diet to soft, continue to monitor  Official speech and swallow eval pending    DVT ppx -Lovenox

## 2021-04-25 NOTE — PHARMACY COMMUNICATION NOTE - COMMENTS
Spoke with Juan MITCHELL and she stated that ICU recommended leaving both Chlordiazepoxide and Phenobarbital for prn dosing

## 2021-04-25 NOTE — CONSULT NOTE ADULT - SUBJECTIVE AND OBJECTIVE BOX
Patient is a 56y old  Male who presents with a chief complaint of EtOH withdrawal (24 Apr 2021 16:00)      HPI:  Patient is a 56M with a PMH of chronic ETOH abuse with hx of alcohol withdrawal and DTs with frequent hospital admissions, alcoholic gastritis/esophagitis, PUD, HLD, hx of hypoxic respiratory failure requiring intubation due to aspiration PNA who presents to the ED for abdominal pain and vomiting.  Patient reports severe abdominal pain for the last three days with vomiting.  Noted to have brown vomitus in ED with concern for variceal bleeding.  Patient states that his last drink was three days ago.  Tachycardic in ED to 127.  Labs show significant lactic acidosis.  Blood alcohol level of 296.  Patient reportedly withdrawing in the ED and was started on benzodiazepines.  CIWA currently 0.  Will admit patient to tele (recent prolonged hospital stay with ICU visit due to uncontrolled DTs).   (23 Apr 2021 22:08)  Consulted for etoh withdrawal    Allergies    No Known Allergies    Intolerances        MEDICATIONS  (STANDING):  atorvastatin 20 milliGRAM(s) Oral at bedtime  folic acid 1 milliGRAM(s) Oral daily  lactated ringers. 2000 milliLiter(s) (125 mL/Hr) IV Continuous <Continuous>  multivitamin 1 Tablet(s) Oral daily  pantoprazole    Tablet 40 milliGRAM(s) Oral before breakfast  PHENobarbital Injectable 130 milliGRAM(s) IV Push every 6 hours  sucralfate 1 Gram(s) Oral four times a day  thiamine 100 milliGRAM(s) Oral daily    MEDICATIONS  (PRN):  chlordiazePOXIDE 50 milliGRAM(s) Oral every 6 hours PRN Anxiety and/or Alcohol Withdrawal Symptoms  LORazepam   Injectable 4 milliGRAM(s) IV Push every 1 hour PRN Agitation      Daily     Daily     Drug Dosing Weight  Height (cm): 170.2 (23 Apr 2021 15:36)  Weight (kg): 72.6 (23 Apr 2021 15:36)  BMI (kg/m2): 25.1 (23 Apr 2021 15:36)  BSA (m2): 1.84 (23 Apr 2021 15:36)    PAST MEDICAL & SURGICAL HISTORY:  PUD (peptic ulcer disease)    Mixed hyperlipidemia    EtOH dependence    Gastritis    Substance abuse    DTs (delirium tremens)    No significant past surgical history        FAMILY HISTORY:      SOCIAL HISTORY: + etoh abuse no ivdu    ADVANCE DIRECTIVES: full code    REVIEW OF SYSTEMS:    CONSTITUTIONAL:  no weight loss or night sweats  HEENT:  Eyes:  No diplopia or blurred vision. ENT:  No earache, sore throat or runny nose.  CARDIOVASCULAR:  No pressure, squeezing, tightness, heaviness or aching about the chest, neck, axilla or epigastrium.  RESPIRATORY:  No cough, shortness of breath, PND or orthopnea.  GASTROINTESTINAL:  No nausea, vomiting or diarrhea.  GENITOURINARY:  No dysuria, frequency or urgency.  MUSCULOSKELETAL:  No joint pain  SKIN:  No change in skin, hair or nails.  NEUROLOGIC:  No paresthesias, fasciculations, seizures or weakness.  PSYCHIATRIC:  No disorder of thought or mood.  HEMATOLOGICAL:  No easy bruising or bleeding.       ICU Vital Signs Last 24 Hrs  T(C): 36.3 (25 Apr 2021 10:36), Max: 36.9 (25 Apr 2021 04:25)  T(F): 97.4 (25 Apr 2021 10:36), Max: 98.4 (25 Apr 2021 04:25)  HR: 117 (25 Apr 2021 10:36) (65 - 117)  BP: 145/90 (25 Apr 2021 10:36) (122/77 - 154/98)  BP(mean): 107 (25 Apr 2021 10:36) (107 - 116)  ABP: --  ABP(mean): --  RR: 18 (25 Apr 2021 10:36) (17 - 18)  SpO2: 98% (25 Apr 2021 10:36) (94% - 98%)          I&O's Detail    24 Apr 2021 07:01  -  25 Apr 2021 07:00  --------------------------------------------------------  IN:    Lactated Ringers: 1000 mL    Oral Fluid: 540 mL  Total IN: 1540 mL    OUT:    Voided (mL): 2675 mL  Total OUT: 2675 mL    Total NET: -1135 mL      25 Apr 2021 07:01  -  25 Apr 2021 11:22  --------------------------------------------------------  IN:    Oral Fluid: 240 mL  Total IN: 240 mL    OUT:  Total OUT: 0 mL    Total NET: 240 mL          PHYSICAL EXAM:    GENERAL: NAD, well-groomed, well-developed  HEAD:  Atraumatic, Normocephalic  EYES: EOMI, PERRLA, conjunctiva and sclera clear  ENMT: No tonsillar erythema, exudates, or enlargement; Moist mucous membranes, Good dentition, No lesions  NECK: Supple, No JVD, Normal thyroid  NERVOUS SYSTEM:  Alert & Oriented X2, Good concentration; Motor Strength 5/5 B/L upper and lower extremities; DTRs 2+ intact and symmetric  CHEST/LUNG: Clear to percussion bilaterally; No rales, rhonchi, wheezing, or rubs  HEART: Regular rate and rhythm; No murmurs, rubs, or gallops  ABDOMEN: Soft, Nontender, Nondistended; Bowel sounds present  EXTREMITIES:  2+ Peripheral Pulses, No clubbing, cyanosis, or edema  LYMPH: No lymphadenopathy noted  SKIN: No rashes or lesions    LABS:  CBC Full  -  ( 25 Apr 2021 06:35 )  WBC Count : 3.89 K/uL  RBC Count : 4.01 M/uL  Hemoglobin : 10.0 g/dL  Hematocrit : 32.0 %  Platelet Count - Automated : 213 K/uL  Mean Cell Volume : 79.8 fl  Mean Cell Hemoglobin : 24.9 pg  Mean Cell Hemoglobin Concentration : 31.3 gm/dL  Auto Neutrophil # : x  Auto Lymphocyte # : x  Auto Monocyte # : x  Auto Eosinophil # : x  Auto Basophil # : x  Auto Neutrophil % : x  Auto Lymphocyte % : x  Auto Monocyte % : x  Auto Eosinophil % : x  Auto Basophil % : x    04-25    141  |  106  |  7   ----------------------------<  94  3.4<L>   |  28  |  0.81    Ca    8.9      25 Apr 2021 06:35  Mg     2.1     04-25    TPro  7.3  /  Alb  3.3  /  TBili  1.0  /  DBili  x   /  AST  41<H>  /  ALT  26  /  AlkPhos  42  04-24    CAPILLARY BLOOD GLUCOSE      POCT Blood Glucose.: 117 mg/dL (23 Apr 2021 15:46)        Lactate, Blood: 6.6 mmol/L (04-23 @ 20:13)  Lactate, Blood: 9.0 mmol/L (04-23 @ 16:23)    CRITICAL CARE TIME SPENT:  30 min  
Patient is a 56y old  Male who presents with a chief complaint of EtOH withdrawal (25 Apr 2021 15:20)    HPI:  Patient is a 56M with a PMH of chronic ETOH abuse with hx of alcohol withdrawal and DTs with frequent hospital admissions, alcoholic gastritis/esophagitis, PUD, HLD, hx of hypoxic respiratory failure requiring intubation due to aspiration PNA who presents to the ED for abdominal pain and vomiting.  Patient reports severe abdominal pain for the last three days with vomiting.  Noted to have brown vomitus in ED with concern for variceal bleeding.  Patient states that his last drink was three days ago.  Tachycardic in ED to 127.  Labs show significant lactic acidosis.  Blood alcohol level of 296.  Patient reportedly withdrawing in the ED and was started on benzodiazepines.  CIWA currently 0.  Will admit patient to tele (recent prolonged hospital stay with ICU visit due to uncontrolled DTs).   (23 Apr 2021 22:08)    Allergies:  No Known Allergies    Medications:  chlordiazePOXIDE 50 milliGRAM(s) Oral every 6 hours PRN  dexMEDEtomidine Infusion 0.2 MICROgram(s)/kG/Hr IV Continuous <Continuous>  enoxaparin Injectable 40 milliGRAM(s) SubCutaneous daily  LORazepam   Injectable 4 milliGRAM(s) IV Push every 1 hour PRN  pantoprazole    Tablet 40 milliGRAM(s) Oral before breakfast  PHENobarbital Injectable 130 milliGRAM(s) IV Push three times a day PRN  PHENobarbital Injectable 130 milliGRAM(s) IV Push every 6 hours  sucralfate 1 Gram(s) Oral four times a day    Drug Dosing Weight  Height (cm): 170.2 (23 Apr 2021 15:36)  Weight (kg): 72.6 (23 Apr 2021 15:36)  BMI (kg/m2): 25.1 (23 Apr 2021 15:36)  BSA (m2): 1.84 (23 Apr 2021 15:36)    PAST MEDICAL & SURGICAL HISTORY:  PUD (peptic ulcer disease)  Mixed hyperlipidemia  EtOH dependence  Gastritis  Substance abuse  DTs (delirium tremens)  No significant past surgical history    REVIEW OF SYSTEMS: Unable to obtain due to patient's cognitive status.    Vital Signs Last 24 Hrs  T(C): 36.8 (25 Apr 2021 18:02), Max: 36.9 (25 Apr 2021 04:25)  T(F): 98.3 (25 Apr 2021 18:02), Max: 98.4 (25 Apr 2021 04:25)  HR: 75 (25 Apr 2021 20:32) (68 - 117)  BP: 154/103 (25 Apr 2021 18:02) (127/79 - 156/93)  BP(mean): 120 (25 Apr 2021 18:02) (107 - 120)  RR: 17 (25 Apr 2021 18:02) (17 - 18)  SpO2: 97% (25 Apr 2021 18:02) (95% - 98%)    I&O's Detail    24 Apr 2021 07:01  -  25 Apr 2021 07:00  --------------------------------------------------------  IN:    Lactated Ringers: 1000 mL    Oral Fluid: 540 mL  Total IN: 1540 mL    OUT:    Voided (mL): 2675 mL  Total OUT: 2675 mL    Total NET: -1135 mL      25 Apr 2021 07:01  -  25 Apr 2021 22:48  --------------------------------------------------------  IN:    Oral Fluid: 358 mL  Total IN: 358 mL    OUT:  Total OUT: 0 mL    Total NET: 358 mL    PHYSICAL EXAM  GENERAL: NAD, well-groomed, well-developed. Sitting up in bed.   HEENT: NCAT, EOMI, PERRL, moist mucous membranes. ++oral secretions.  CHEST/LUNG: Scattered rhonchi, no wheezes/rales.   HEART: RRR, +S1S2, no m/r/g  ABDOMEN: soft, nontender, nondistended; +BS   EXTREMITIES:  2+ peripheral pulses; no clubbing/cyanosis/edema    LABS:                        10.0   3.89  )-----------( 213      ( 25 Apr 2021 06:35 )             32.0   04-25    141  |  106  |  7   ----------------------------<  94  3.4<L>   |  28  |  0.81    Ca    8.9      25 Apr 2021 06:35  Mg     2.1     04-25    TPro  7.3  /  Alb  3.3  /  TBili  1.0  /  DBili  x   /  AST  41<H>  /  ALT  26  /  AlkPhos  42  04-24    CAPILLARY BLOOD GLUCOSE  POCT Blood Glucose.: 92 mg/dL (25 Apr 2021 21:24)    RADIOLOGY:    < from: CT Abdomen and Pelvis w/ IV Cont (04.23.21 @ 18:39) >    IMPRESSION:  Hepatic steatosis  Diverticulosis without diverticulitis. Mild wall thickening of the distal esophagus. Submucosal fat deposition in the cecum and ascending colon unchanged. Possibly sequela from prior inflammation.  Subcentimeter right lung nodule poorly evaluated secondary to motion appears unchanged from the previous exam. If there is clinical concern recommend nonemergent noncontrast chest CT.    < end of copied text >      CRITICAL CARE TIME SPENT:

## 2021-04-25 NOTE — RAPID RESPONSE TEAM SUMMARY - NSSITUATIONBACKGROUNDRRT_GEN_ALL_CORE
56y old  Male who presents with a chief complaint of ETOH withdrawal (23 Apr 2021 22:08) . Pt is admitted frequently to hospital for ETOH abuse/withdrawl  with multiple upgrades to ICU. RRT  called due to CIWA of 24. ICU notified and will reevaluate the patient. /98, 76, 24, 97.6 , 98% RA  FS 92  .

## 2021-04-25 NOTE — CONSULT NOTE ADULT - ATTENDING COMMENTS
pt seen and examined on rounds with ICU team 4/26  pt admitted to ICU overnight    56M PMH chronic ETOH abuse with long standing hx of alcohol withdrawal and DTs with frequent hospital admissions, alcoholic gastritis/esophagitis, PUD, HLD, hx of hypoxic respiratory failure requiring intubation due to aspiration PNA, COVID-19 who presents to the ED for abdominal pain and vomiting, coffee ground emesis. Course complicated by ETOH/DTs with transfer to ICU    hypothermic today, bradycardic, transient hypotension improved with fluids and rewarming    Dx: ETOH withdrawal with DTs and agitation, abdominal pain, gastritis, upper GI bleed    - on off precedex drip  - will cont phenobarbital 130mg q8 hrs  - bradycardia with subsequent hypotension may have stemmed from hypothermia  - will do infectious work up for hypothermia: check blood cx, sputum cx, CXR, UA and culture  - pt with hx of aspiration/staph PNA: will monitor  - antibx on hold pending culture results  - monitor electrolytes and supplement as needed  - protonix and carafate for gastritis  - multiple discussions in past regarding alcohol cessation however pt has not stopped alcohol intake    Critical Care Time: 35 min

## 2021-04-26 LAB
ALBUMIN SERPL ELPH-MCNC: 3.4 G/DL — SIGNIFICANT CHANGE UP (ref 3.3–5)
ALP SERPL-CCNC: 50 U/L — SIGNIFICANT CHANGE UP (ref 40–120)
ALT FLD-CCNC: 32 U/L — SIGNIFICANT CHANGE UP (ref 12–78)
ANION GAP SERPL CALC-SCNC: 5 MMOL/L — SIGNIFICANT CHANGE UP (ref 5–17)
AST SERPL-CCNC: 40 U/L — HIGH (ref 15–37)
BASE EXCESS BLDA CALC-SCNC: 1.2 MMOL/L — SIGNIFICANT CHANGE UP (ref -2–2)
BASOPHILS # BLD AUTO: 0.02 K/UL — SIGNIFICANT CHANGE UP (ref 0–0.2)
BASOPHILS NFR BLD AUTO: 0.6 % — SIGNIFICANT CHANGE UP (ref 0–2)
BILIRUB SERPL-MCNC: 0.9 MG/DL — SIGNIFICANT CHANGE UP (ref 0.2–1.2)
BLOOD GAS COMMENTS: SIGNIFICANT CHANGE UP
BLOOD GAS COMMENTS: SIGNIFICANT CHANGE UP
BLOOD GAS SOURCE: SIGNIFICANT CHANGE UP
BUN SERPL-MCNC: 5 MG/DL — LOW (ref 7–23)
CALCIUM SERPL-MCNC: 9.1 MG/DL — SIGNIFICANT CHANGE UP (ref 8.5–10.1)
CHLORIDE SERPL-SCNC: 107 MMOL/L — SIGNIFICANT CHANGE UP (ref 96–108)
CO2 SERPL-SCNC: 27 MMOL/L — SIGNIFICANT CHANGE UP (ref 22–31)
CREAT SERPL-MCNC: 0.74 MG/DL — SIGNIFICANT CHANGE UP (ref 0.5–1.3)
EOSINOPHIL # BLD AUTO: 0.36 K/UL — SIGNIFICANT CHANGE UP (ref 0–0.5)
EOSINOPHIL NFR BLD AUTO: 10.4 % — HIGH (ref 0–6)
GLUCOSE SERPL-MCNC: 123 MG/DL — HIGH (ref 70–99)
HCO3 BLDA-SCNC: 28 MMOL/L — SIGNIFICANT CHANGE UP (ref 21–29)
HCT VFR BLD CALC: 33.8 % — LOW (ref 39–50)
HGB BLD-MCNC: 10.4 G/DL — LOW (ref 13–17)
HOROWITZ INDEX BLDA+IHG-RTO: 28 — SIGNIFICANT CHANGE UP
IMM GRANULOCYTES NFR BLD AUTO: 0.3 % — SIGNIFICANT CHANGE UP (ref 0–1.5)
LACTATE SERPL-SCNC: 0.7 MMOL/L — SIGNIFICANT CHANGE UP (ref 0.7–2)
LYMPHOCYTES # BLD AUTO: 1.23 K/UL — SIGNIFICANT CHANGE UP (ref 1–3.3)
LYMPHOCYTES # BLD AUTO: 35.7 % — SIGNIFICANT CHANGE UP (ref 13–44)
MAGNESIUM SERPL-MCNC: 2.1 MG/DL — SIGNIFICANT CHANGE UP (ref 1.6–2.6)
MCHC RBC-ENTMCNC: 24.8 PG — LOW (ref 27–34)
MCHC RBC-ENTMCNC: 30.8 GM/DL — LOW (ref 32–36)
MCV RBC AUTO: 80.7 FL — SIGNIFICANT CHANGE UP (ref 80–100)
MONOCYTES # BLD AUTO: 0.26 K/UL — SIGNIFICANT CHANGE UP (ref 0–0.9)
MONOCYTES NFR BLD AUTO: 7.5 % — SIGNIFICANT CHANGE UP (ref 2–14)
NEUTROPHILS # BLD AUTO: 1.57 K/UL — LOW (ref 1.8–7.4)
NEUTROPHILS NFR BLD AUTO: 45.5 % — SIGNIFICANT CHANGE UP (ref 43–77)
NRBC # BLD: 0 /100 WBCS — SIGNIFICANT CHANGE UP (ref 0–0)
PCO2 BLDA: 43 MMHG — SIGNIFICANT CHANGE UP (ref 32–46)
PH BLD: 7.4 — SIGNIFICANT CHANGE UP (ref 7.35–7.45)
PHOSPHATE SERPL-MCNC: 4.1 MG/DL — SIGNIFICANT CHANGE UP (ref 2.5–4.5)
PLATELET # BLD AUTO: 204 K/UL — SIGNIFICANT CHANGE UP (ref 150–400)
PO2 BLDA: 111 MMHG — HIGH (ref 74–108)
POTASSIUM SERPL-MCNC: 3.4 MMOL/L — LOW (ref 3.5–5.3)
POTASSIUM SERPL-SCNC: 3.4 MMOL/L — LOW (ref 3.5–5.3)
PROT SERPL-MCNC: 7.5 GM/DL — SIGNIFICANT CHANGE UP (ref 6–8.3)
RBC # BLD: 4.19 M/UL — LOW (ref 4.2–5.8)
RBC # FLD: 17.1 % — HIGH (ref 10.3–14.5)
SAO2 % BLDA: 98 % — HIGH (ref 92–96)
SODIUM SERPL-SCNC: 139 MMOL/L — SIGNIFICANT CHANGE UP (ref 135–145)
WBC # BLD: 3.45 K/UL — LOW (ref 3.8–10.5)
WBC # FLD AUTO: 3.45 K/UL — LOW (ref 3.8–10.5)

## 2021-04-26 PROCEDURE — 99291 CRITICAL CARE FIRST HOUR: CPT

## 2021-04-26 PROCEDURE — 99233 SBSQ HOSP IP/OBS HIGH 50: CPT

## 2021-04-26 PROCEDURE — 71045 X-RAY EXAM CHEST 1 VIEW: CPT | Mod: 26

## 2021-04-26 RX ORDER — NOREPINEPHRINE BITARTRATE/D5W 8 MG/250ML
0.05 PLASTIC BAG, INJECTION (ML) INTRAVENOUS
Qty: 8 | Refills: 0 | Status: DISCONTINUED | OUTPATIENT
Start: 2021-04-26 | End: 2021-04-26

## 2021-04-26 RX ORDER — PHENOBARBITAL 60 MG
130 TABLET ORAL EVERY 8 HOURS
Refills: 0 | Status: DISCONTINUED | OUTPATIENT
Start: 2021-04-26 | End: 2021-04-27

## 2021-04-26 RX ORDER — SODIUM CHLORIDE 9 MG/ML
1000 INJECTION, SOLUTION INTRAVENOUS ONCE
Refills: 0 | Status: COMPLETED | OUTPATIENT
Start: 2021-04-26 | End: 2021-04-26

## 2021-04-26 RX ORDER — THIAMINE MONONITRATE (VIT B1) 100 MG
100 TABLET ORAL DAILY
Refills: 0 | Status: DISCONTINUED | OUTPATIENT
Start: 2021-04-26 | End: 2021-04-27

## 2021-04-26 RX ORDER — POTASSIUM CHLORIDE 20 MEQ
10 PACKET (EA) ORAL
Refills: 0 | Status: COMPLETED | OUTPATIENT
Start: 2021-04-26 | End: 2021-04-26

## 2021-04-26 RX ORDER — POTASSIUM CHLORIDE 20 MEQ
10 PACKET (EA) ORAL
Refills: 0 | Status: DISCONTINUED | OUTPATIENT
Start: 2021-04-26 | End: 2021-04-26

## 2021-04-26 RX ORDER — FOLIC ACID 0.8 MG
1 TABLET ORAL DAILY
Refills: 0 | Status: DISCONTINUED | OUTPATIENT
Start: 2021-04-26 | End: 2021-04-27

## 2021-04-26 RX ORDER — PANTOPRAZOLE SODIUM 20 MG/1
40 TABLET, DELAYED RELEASE ORAL DAILY
Refills: 0 | Status: DISCONTINUED | OUTPATIENT
Start: 2021-04-26 | End: 2021-04-27

## 2021-04-26 RX ADMIN — Medication 100 MILLIEQUIVALENT(S): at 09:23

## 2021-04-26 RX ADMIN — Medication 130 MILLIGRAM(S): at 21:27

## 2021-04-26 RX ADMIN — Medication 100 MILLIEQUIVALENT(S): at 08:19

## 2021-04-26 RX ADMIN — Medication 1 MILLIGRAM(S): at 21:32

## 2021-04-26 RX ADMIN — SODIUM CHLORIDE 1000 MILLILITER(S): 9 INJECTION, SOLUTION INTRAVENOUS at 08:00

## 2021-04-26 RX ADMIN — DEXMEDETOMIDINE HYDROCHLORIDE IN 0.9% SODIUM CHLORIDE 3.63 MICROGRAM(S)/KG/HR: 4 INJECTION INTRAVENOUS at 00:16

## 2021-04-26 RX ADMIN — Medication 100 MILLIEQUIVALENT(S): at 05:49

## 2021-04-26 RX ADMIN — PANTOPRAZOLE SODIUM 40 MILLIGRAM(S): 20 TABLET, DELAYED RELEASE ORAL at 21:27

## 2021-04-26 RX ADMIN — ENOXAPARIN SODIUM 40 MILLIGRAM(S): 100 INJECTION SUBCUTANEOUS at 12:51

## 2021-04-26 RX ADMIN — Medication 130 MILLIGRAM(S): at 16:54

## 2021-04-26 RX ADMIN — Medication 130 MILLIGRAM(S): at 12:51

## 2021-04-26 RX ADMIN — Medication 130 MILLIGRAM(S): at 05:49

## 2021-04-26 RX ADMIN — CHLORHEXIDINE GLUCONATE 1 APPLICATION(S): 213 SOLUTION TOPICAL at 05:50

## 2021-04-26 RX ADMIN — Medication 100 MILLIGRAM(S): at 21:32

## 2021-04-26 RX ADMIN — DEXMEDETOMIDINE HYDROCHLORIDE IN 0.9% SODIUM CHLORIDE 3.63 MICROGRAM(S)/KG/HR: 4 INJECTION INTRAVENOUS at 05:49

## 2021-04-26 NOTE — DIETITIAN INITIAL EVALUATION ADULT. - NS FNS REASON FOR WEIGHT CHANG
as per previous adm records/RD note on 04/06/18, reported usual wt. of 186 LBS noted/other (specify)

## 2021-04-26 NOTE — DIETITIAN INITIAL EVALUATION ADULT. - PERTINENT LABORATORY DATA
04-26 Na139 mmol/L Glu 123 mg/dL<H> K+ 3.4 mmol/L<L> Cr  0.74 mg/dL BUN 5 mg/dL<L> 04-26 Phos 4.1 mg/dL 04-26 Alb 3.4 g/dL04-26 ALT 32 U/L AST 40 U/L<H> Alkaline Phosphatase 50 U/L

## 2021-04-26 NOTE — DIETITIAN INITIAL EVALUATION ADULT. - OTHER INFO
Pt c agitation as per RN, currently asleep when seen.  Pt is known to department from multiple previous adm.

## 2021-04-26 NOTE — DIETITIAN INITIAL EVALUATION ADULT. - FACTORS AFF FOOD INTAKE
alcohol withdrawal, swallow evaluation ordered/change in mental status/difficulty swallowing/other (specify)

## 2021-04-26 NOTE — CHART NOTE - NSCHARTNOTEFT_GEN_A_CORE
pt admitted overnight to ICU for ETOH withdrawal/DTs agitated with elevated CIWA    refer to consult sheet for assessment and plan

## 2021-04-26 NOTE — DIETITIAN INITIAL EVALUATION ADULT. - PERTINENT MEDS FT
MEDICATIONS  (STANDING):  atorvastatin 20 milliGRAM(s) Oral at bedtime  chlorhexidine 2% Cloths 1 Application(s) Topical <User Schedule>  dexMEDEtomidine Infusion 0.2 MICROgram(s)/kG/Hr (3.63 mL/Hr) IV Continuous <Continuous>  enoxaparin Injectable 40 milliGRAM(s) SubCutaneous daily  folic acid Injectable 1 milliGRAM(s) IV Push daily  multivitamin 1 Tablet(s) Oral daily  pantoprazole  Injectable 40 milliGRAM(s) IV Push daily  PHENobarbital Injectable 130 milliGRAM(s) IV Push every 8 hours  sucralfate 1 Gram(s) Oral four times a day  thiamine Injectable 100 milliGRAM(s) IV Push daily    MEDICATIONS  (PRN):  LORazepam   Injectable 4 milliGRAM(s) IV Push every 1 hour PRN Agitation  PHENobarbital Injectable 130 milliGRAM(s) IV Push three times a day PRN CIWA >16

## 2021-04-27 ENCOUNTER — INPATIENT (INPATIENT)
Facility: HOSPITAL | Age: 56
LOS: 4 days | Discharge: LEFT BEFORE DISCHARGE | DRG: 770 | End: 2021-05-02
Attending: INTERNAL MEDICINE | Admitting: SURGERY
Payer: MEDICAID

## 2021-04-27 VITALS
HEART RATE: 65 BPM | SYSTOLIC BLOOD PRESSURE: 127 MMHG | RESPIRATION RATE: 15 BRPM | DIASTOLIC BLOOD PRESSURE: 85 MMHG | WEIGHT: 151.9 LBS | OXYGEN SATURATION: 100 % | TEMPERATURE: 98 F

## 2021-04-27 VITALS
OXYGEN SATURATION: 96 % | SYSTOLIC BLOOD PRESSURE: 111 MMHG | DIASTOLIC BLOOD PRESSURE: 78 MMHG | HEART RATE: 72 BPM | RESPIRATION RATE: 20 BRPM

## 2021-04-27 DIAGNOSIS — F10.139 ALCOHOL ABUSE WITH WITHDRAWAL, UNSPECIFIED: ICD-10-CM

## 2021-04-27 LAB
ANION GAP SERPL CALC-SCNC: 5 MMOL/L — SIGNIFICANT CHANGE UP (ref 5–17)
BUN SERPL-MCNC: 12 MG/DL — SIGNIFICANT CHANGE UP (ref 7–23)
CALCIUM SERPL-MCNC: 9.6 MG/DL — SIGNIFICANT CHANGE UP (ref 8.5–10.1)
CHLORIDE SERPL-SCNC: 108 MMOL/L — SIGNIFICANT CHANGE UP (ref 96–108)
CO2 SERPL-SCNC: 27 MMOL/L — SIGNIFICANT CHANGE UP (ref 22–31)
CREAT SERPL-MCNC: 1.08 MG/DL — SIGNIFICANT CHANGE UP (ref 0.5–1.3)
GLUCOSE SERPL-MCNC: 74 MG/DL — SIGNIFICANT CHANGE UP (ref 70–99)
GRAM STN FLD: SIGNIFICANT CHANGE UP
HCT VFR BLD CALC: 39.2 % — SIGNIFICANT CHANGE UP (ref 39–50)
HGB BLD-MCNC: 12 G/DL — LOW (ref 13–17)
MAGNESIUM SERPL-MCNC: 2.2 MG/DL — SIGNIFICANT CHANGE UP (ref 1.6–2.6)
MCHC RBC-ENTMCNC: 24.9 PG — LOW (ref 27–34)
MCHC RBC-ENTMCNC: 30.6 GM/DL — LOW (ref 32–36)
MCV RBC AUTO: 81.5 FL — SIGNIFICANT CHANGE UP (ref 80–100)
NRBC # BLD: 0 /100 WBCS — SIGNIFICANT CHANGE UP (ref 0–0)
PHOSPHATE SERPL-MCNC: 3.1 MG/DL — SIGNIFICANT CHANGE UP (ref 2.5–4.5)
PLATELET # BLD AUTO: 264 K/UL — SIGNIFICANT CHANGE UP (ref 150–400)
POTASSIUM SERPL-MCNC: 3.8 MMOL/L — SIGNIFICANT CHANGE UP (ref 3.5–5.3)
POTASSIUM SERPL-SCNC: 3.8 MMOL/L — SIGNIFICANT CHANGE UP (ref 3.5–5.3)
PROCALCITONIN SERPL-MCNC: 0.07 NG/ML — SIGNIFICANT CHANGE UP (ref 0.02–0.1)
RBC # BLD: 4.81 M/UL — SIGNIFICANT CHANGE UP (ref 4.2–5.8)
RBC # FLD: 17.4 % — HIGH (ref 10.3–14.5)
SODIUM SERPL-SCNC: 140 MMOL/L — SIGNIFICANT CHANGE UP (ref 135–145)
SPECIMEN SOURCE: SIGNIFICANT CHANGE UP
WBC # BLD: 9.16 K/UL — SIGNIFICANT CHANGE UP (ref 3.8–10.5)
WBC # FLD AUTO: 9.16 K/UL — SIGNIFICANT CHANGE UP (ref 3.8–10.5)

## 2021-04-27 PROCEDURE — 99291 CRITICAL CARE FIRST HOUR: CPT

## 2021-04-27 PROCEDURE — 80053 COMPREHEN METABOLIC PANEL: CPT

## 2021-04-27 PROCEDURE — 99232 SBSQ HOSP IP/OBS MODERATE 35: CPT

## 2021-04-27 PROCEDURE — C9113: CPT

## 2021-04-27 PROCEDURE — 36415 COLL VENOUS BLD VENIPUNCTURE: CPT

## 2021-04-27 PROCEDURE — 86769 SARS-COV-2 COVID-19 ANTIBODY: CPT

## 2021-04-27 PROCEDURE — 85027 COMPLETE CBC AUTOMATED: CPT

## 2021-04-27 PROCEDURE — 82140 ASSAY OF AMMONIA: CPT

## 2021-04-27 RX ORDER — PANTOPRAZOLE SODIUM 20 MG/1
1 TABLET, DELAYED RELEASE ORAL
Qty: 0 | Refills: 0 | DISCHARGE

## 2021-04-27 RX ORDER — DEXMEDETOMIDINE HYDROCHLORIDE IN 0.9% SODIUM CHLORIDE 4 UG/ML
0.5 INJECTION INTRAVENOUS
Qty: 400 | Refills: 0 | Status: DISCONTINUED | OUTPATIENT
Start: 2021-04-27 | End: 2021-05-01

## 2021-04-27 RX ORDER — HEPARIN SODIUM 5000 [USP'U]/ML
5000 INJECTION INTRAVENOUS; SUBCUTANEOUS EVERY 8 HOURS
Refills: 0 | Status: DISCONTINUED | OUTPATIENT
Start: 2021-04-27 | End: 2021-05-02

## 2021-04-27 RX ORDER — FOLIC ACID 0.8 MG
1 TABLET ORAL DAILY
Refills: 0 | Status: DISCONTINUED | OUTPATIENT
Start: 2021-04-27 | End: 2021-05-02

## 2021-04-27 RX ORDER — SODIUM CHLORIDE 9 MG/ML
1000 INJECTION, SOLUTION INTRAVENOUS
Refills: 0 | Status: DISCONTINUED | OUTPATIENT
Start: 2021-04-27 | End: 2021-04-28

## 2021-04-27 RX ORDER — THIAMINE MONONITRATE (VIT B1) 100 MG
100 TABLET ORAL DAILY
Refills: 0 | Status: DISCONTINUED | OUTPATIENT
Start: 2021-04-27 | End: 2021-05-02

## 2021-04-27 RX ORDER — ATORVASTATIN CALCIUM 80 MG/1
1 TABLET, FILM COATED ORAL
Qty: 0 | Refills: 0 | DISCHARGE

## 2021-04-27 RX ORDER — AMLODIPINE BESYLATE 2.5 MG/1
1 TABLET ORAL
Qty: 0 | Refills: 0 | DISCHARGE

## 2021-04-27 RX ORDER — PANTOPRAZOLE SODIUM 20 MG/1
40 TABLET, DELAYED RELEASE ORAL DAILY
Refills: 0 | Status: DISCONTINUED | OUTPATIENT
Start: 2021-04-27 | End: 2021-05-01

## 2021-04-27 RX ORDER — SODIUM CHLORIDE 9 MG/ML
1000 INJECTION, SOLUTION INTRAVENOUS ONCE
Refills: 0 | Status: COMPLETED | OUTPATIENT
Start: 2021-04-27 | End: 2021-04-27

## 2021-04-27 RX ORDER — ASPIRIN/CALCIUM CARB/MAGNESIUM 324 MG
1 TABLET ORAL
Qty: 0 | Refills: 0 | DISCHARGE

## 2021-04-27 RX ADMIN — Medication 130 MILLIGRAM(S): at 13:57

## 2021-04-27 RX ADMIN — Medication 130 MILLIGRAM(S): at 05:44

## 2021-04-27 RX ADMIN — DEXMEDETOMIDINE HYDROCHLORIDE IN 0.9% SODIUM CHLORIDE 8.25 MICROGRAM(S)/KG/HR: 4 INJECTION INTRAVENOUS at 19:15

## 2021-04-27 RX ADMIN — HEPARIN SODIUM 5000 UNIT(S): 5000 INJECTION INTRAVENOUS; SUBCUTANEOUS at 22:17

## 2021-04-27 RX ADMIN — Medication 100 MILLIGRAM(S): at 12:41

## 2021-04-27 RX ADMIN — CHLORHEXIDINE GLUCONATE 1 APPLICATION(S): 213 SOLUTION TOPICAL at 05:44

## 2021-04-27 RX ADMIN — SODIUM CHLORIDE 100 MILLILITER(S): 9 INJECTION, SOLUTION INTRAVENOUS at 16:48

## 2021-04-27 RX ADMIN — DEXMEDETOMIDINE HYDROCHLORIDE IN 0.9% SODIUM CHLORIDE 3.63 MICROGRAM(S)/KG/HR: 4 INJECTION INTRAVENOUS at 08:46

## 2021-04-27 RX ADMIN — Medication 1 MILLIGRAM(S): at 13:57

## 2021-04-27 RX ADMIN — SODIUM CHLORIDE 1000 MILLILITER(S): 9 INJECTION, SOLUTION INTRAVENOUS at 08:46

## 2021-04-27 RX ADMIN — ENOXAPARIN SODIUM 40 MILLIGRAM(S): 100 INJECTION SUBCUTANEOUS at 11:37

## 2021-04-27 RX ADMIN — DEXMEDETOMIDINE HYDROCHLORIDE IN 0.9% SODIUM CHLORIDE 3.63 MICROGRAM(S)/KG/HR: 4 INJECTION INTRAVENOUS at 02:50

## 2021-04-27 RX ADMIN — DEXMEDETOMIDINE HYDROCHLORIDE IN 0.9% SODIUM CHLORIDE 3.63 MICROGRAM(S)/KG/HR: 4 INJECTION INTRAVENOUS at 03:13

## 2021-04-27 RX ADMIN — PANTOPRAZOLE SODIUM 40 MILLIGRAM(S): 20 TABLET, DELAYED RELEASE ORAL at 11:37

## 2021-04-27 NOTE — H&P ADULT - HISTORY OF PRESENT ILLNESS
see progress note    Patient transferred from [place], for load balancing  Patient with hx. of dts in past, and binge drinking  was on phenobarb and precedex at time of transfer  ? hematemesis on initial presentation

## 2021-04-27 NOTE — ACUTE INTERFACILITY TRANSFER NOTE - HOSPITAL COURSE
56 year old man with a PMHx of chronic ETOH abuse with long standing hx of alcohol withdrawal and DTs with frequent hospital admissions, alcoholic gastritis/esophagitis, PUD, HLD, history of hypoxic respiratory failure requiring intubation due to aspiration PNA, COVID-19. HE presented to the ED on 4/23/21 for abdominal pain and vomiting, coffee ground emesis. Course complicated by ETOH/DTs with transfer to ICU for further management. While in the ICU, he had hypothermic, bradycardic, transient hypotension improved with fluids and rewarming. Has been on and off precedex and phenobarbital 130mg q8 hrs. Infectious workup done. Please follow up cultures. Antibx on hold pending culture results. On protonix and carafate for gastritis. Hgb stable.  Due to level loading , patient will be transferred to the [facility] for further care. Family agreed.     Dx: ETOH withdrawal with DTs and agitation, abdominal pain, gastritis and GI bleed.           56 year old man with a PMHx of chronic ETOH abuse with long standing hx of alcohol withdrawal and DTs with frequent hospital admissions, alcoholic gastritis/esophagitis, PUD, HLD, history of hypoxic respiratory failure requiring intubation due to aspiration PNA, COVID-19. HE presented to the ED on 4/23/21 for abdominal pain and vomiting, coffee ground emesis. Course complicated by ETOH/DTs with transfer to ICU for further management. While in the ICU, he had hypothermic, bradycardic, transient hypotension improved with fluids and rewarming. Has been on and off precedex and phenobarbital 130mg q8 hrs. Infectious workup done. Please follow up cultures. Antibx on hold pending culture results. On protonix and carafate for gastritis. Hgb stable.  Due to census load balancing, patient will be transferred to [facility] for further care. Family agreed.     Dx: ETOH withdrawal with DTs and agitation, abdominal pain, gastritis and GI bleed.

## 2021-04-27 NOTE — PROGRESS NOTE ADULT - ASSESSMENT
56M PMH chronic ETOH abuse with long standing hx of alcohol withdrawal and DTs with frequent hospital admissions, alcoholic gastritis/esophagitis, PUD, HLD, hx of hypoxic respiratory failure requiring intubation due to aspiration PNA (most recent intubation Aril 2020, recent admission March 2021 with DTs and staph PNA), COVID-19 infection Dec 2020 - did not require intubation presents to the ED for abdominal pain and vomiting, coffee ground emesis. Admitted to medical service 4/23 for gastritis with GI bleed due to chronic alcohol abuse. Arrives intoxicated with blood alcohol level 296 and lactate 9. Course complicated by ETOH withdrawal/DTs severe agitation requiring transfer to ICU for sedation protocol. Found to be hypothermic and bradycardic with transient hypotension.      Dx: ETOH withdrawal with DTs and agitation, abdominal pain, gastritis, upper GI bleed      NEURO  cont sedation with precedex and standing phenobarbital  wean off precedex as tolerates  pt needs alcohol cessation but has not exhibited any willingness to stop drinking    CV  bradycardia resolved with temperature rewarming  had transient hypotension this morning which responded will to IVF bolus  infectious work up in progress however hypotension can also be partly in setting of underlying sedation effects  cont to monitor hemodynamics    GI  cont protonix and carafate for suspected alcoholic gastritis  no further coffee ground emesis, self resolved  has not required pRBC transfusion  Hb stable 10-12    RENAL  initial elevated lactate now normalized with IVF  likely in setting of alcohol use  supplement electrolytes as needed    ID  holding on antibx  follow up blood cx  procalcitonin low 0.07  follow up sputum cx  unclear why pt with initial transient hypothermia which resolved    GEN  spoke to pt's son Lavelle who spoke to pt's wife  family agreeable for transfer to [facility] due to ICU over census here at [facility] for continued care   sign out given to [facility] ICU PA and attending    Critical Care Time: 35 min

## 2021-04-27 NOTE — PROGRESS NOTE ADULT - ASSESSMENT
Patient is a 56M with a PMH of chronic ETOH abuse with hx of alcohol withdrawal and DTs with frequent hospital admissions, alcoholic gastritis/esophagitis, PUD, HLD, hx of hypoxic respiratory failure requiring intubation due to aspiration PNA who presents to the ED for abdominal pain and vomiting.  Patient reports severe abdominal pain for the last three days with vomiting.  Noted to have brown vomitus in ED with concern for variceal bleeding.  Patient states that his last drink was three days ago.  Tachycardic in ED to 127.  Labs show significant lactic acidosis.  Blood alcohol level of 296.  Patient reportedly withdrawing in the ED and was started on benzodiazepines.  CIWA currently 0.  Will admit patient to tele (recent prolonged hospital stay with ICU visit due to uncontrolled DTs).     Alcohol withdrawal syndrome without complication.    -in ICU currently   -continue Precedex gtt, phenobarbital prn, ativan prn  Please taper slowly as patient has significant history of DTs  Thiamine, Folate, MVI daily.     Chronic alcoholic gastritis without hemorrhage.  Plan: toradol prn, ppi.     Lactic acidosis.  Plan: Will hydrate, monitor lactate.   :   R/O Hematemesis without nausea.  Plan: H/H stable, hematemesis unlikely  will continue to monitor.     Swallowing difficulties-NPO currently  Official speech and swallow eval pending    DVT ppx -Lovenox    Spoke with son and made his aware that father is in ICU currently.

## 2021-04-27 NOTE — H&P ADULT - NSHPLABSRESULTS_GEN_ALL_CORE
ABG - ( 26 Apr 2021 09:05 )  pH, Arterial: x     pH, Blood: 7.40  /  pCO2: 43    /  pO2: 111   / HCO3: 28    / Base Excess: 1.2   /  SaO2: 98                  CBC Full  -  ( 27 Apr 2021 05:04 )  WBC Count : 9.16 K/uL  RBC Count : 4.81 M/uL  Hemoglobin : 12.0 g/dL  Hematocrit : 39.2 %  Platelet Count - Automated : 264 K/uL  Mean Cell Volume : 81.5 fl  Mean Cell Hemoglobin : 24.9 pg  Mean Cell Hemoglobin Concentration : 30.6 gm/dL  Auto Neutrophil # : x  Auto Lymphocyte # : x  Auto Monocyte # : x  Auto Eosinophil # : x  Auto Basophil # : x  Auto Neutrophil % : x  Auto Lymphocyte % : x  Auto Monocyte % : x  Auto Eosinophil % : x  Auto Basophil % : x      04-27    140  |  108  |  12  ----------------------------<  74  3.8   |  27  |  1.08    Ca    9.6      27 Apr 2021 05:04  Phos  3.1     04-27  Mg     2.2     04-27    TPro  7.5  /  Alb  3.4  /  TBili  0.9  /  DBili  x   /  AST  40<H>  /  ALT  32  /  AlkPhos  50  04-26

## 2021-04-27 NOTE — PROGRESS NOTE ADULT - SUBJECTIVE AND OBJECTIVE BOX
24 hr events:  on precedex and pehnobarbital  currently calm, sleeping  normothermic today: no further hypothermia or hyperthermia  SBP 80s this morning responded well with 1L fluid bolus SBP currently 120s    ## ROS:  [x] unable to obtain due to sedation      ## Labs:  CBC:                        12.0   9.16  )-----------( 264      ( 27 Apr 2021 05:04 )             39.2     Chem:  04-27    140  |  108  |  12  ----------------------------<  74  3.8   |  27  |  1.08    Ca    9.6      27 Apr 2021 05:04  Phos  3.1     04-27  Mg     2.2     04-27    TPro  7.5  /  Alb  3.4  /  TBili  0.9  /  DBili  x   /  AST  40<H>  /  ALT  32  /  AlkPhos  50  04-26    Procalcitonin, Serum (04.27.21 @ 08:39)    Procalcitonin, Serum: 0.07 ng/mL    Culture - Sputum . (04.27.21 @ 00:29)    Gram Stain:     Few polymorphonuclear leukocytes per low power field    Few Squamous epithelial cells per low power field    Moderate Gram Positive Cocci in Clusters seen per oil power field    Moderate Gram Positive Cocci in Pairs and Chains seen per oil power field    Moderate Gram Variable Rods seen per oil power field    Specimen Source: .Sputum Sputum    Culture -  blood: pending resullts    ## Imaging:  CXR < from: Xray Chest 1 View- PORTABLE-Urgent (Xray Chest 1 View- PORTABLE-Urgent .) (04.26.21 @ 11:14) >  Frontal expiratory view of the chest shows the heart to be normal in size. The lungs show questionable lower right lung infiltrate and there is no evidence of pneumothorax nor pleural effusion.      ## Medications:  atorvastatin 20 milliGRAM(s) Oral at bedtime    enoxaparin Injectable 40 milliGRAM(s) SubCutaneous daily    pantoprazole  Injectable 40 milliGRAM(s) IV Push daily  sucralfate 1 Gram(s) Oral four times a day    dexMEDEtomidine Infusion 0.2 MICROgram(s)/kG/Hr IV Continuous <Continuous>  LORazepam   Injectable 4 milliGRAM(s) IV Push every 1 hour PRN  PHENobarbital Injectable 130 milliGRAM(s) IV Push every 8 hours  PHENobarbital Injectable 130 milliGRAM(s) IV Push three times a day PRN      ## Vitals:  T(C): 36.3 (04-27-21 @ 11:35), Max: 38.3 (04-26-21 @ 19:06)  HR: 64 (04-27-21 @ 13:30) (63 - 98)  BP: 137/84 (04-27-21 @ 13:00) (83/58 - 137/84)  BP(mean): 98 (04-27-21 @ 13:00) (62 - 98)  RR: 14 (04-27-21 @ 13:30) (13 - 27)  SpO2: 97% (04-27-21 @ 13:30) (91% - 98%)    ABG: ABG - ( 26 Apr 2021 09:05 )  pH, Arterial: x     pH, Blood: 7.40  /  pCO2: 43    /  pO2: 111   / HCO3: 28    / Base Excess: 1.2   /  SaO2: 98            04-26 @ 07:01  -  04-27 @ 07:00  --------------------------------------------------------  IN: 106.8 mL / OUT: 1325 mL / NET: -1218.2 mL    04-27 @ 07:01  -  04-27 @ 14:22  --------------------------------------------------------  IN: 1031.7 mL / OUT: 0 mL / NET: 1031.7 mL          ## P/E:  Gen: lying comfortably in bed in no apparent distress, sedated  HEENT: PERRL, Nc/AT  Resp: CTA B/L , no rhonchi, no rales, no wheeze  CVS: RRR  Abd: soft NT/ND +BS  Ext: no c/c/e  Neuro: sedated, arouses to verbal stimuli    CENTRAL LINE: [ ] YES [x ] NO  LOCATION:   DATE INSERTED:  REMOVE: [ ] YES [ ] NO      CHAN: [ ] YES [x ] NO    DATE INSERTED:  REMOVE:  [ ] YES [ ] NO      A-LINE:  [ ] YES [x ] NO  LOCATION:   DATE INSERTED:  REMOVE:  [ ] YES [ ] NO  EXPLAIN:    GLOBAL ISSUE/BEST PRACTICE:  Analgesia: n/a  Sedation: precedex  HOB elevation: yes  Stress ulcer prophylaxis: protonix, carafate  VTE prophylaxis: lovenox  Oral Care: n/a  Glycemic control: n/a  Nutrition: po diet    CODE STATUS: [x ] full code  [ ] DNR  [ ] DNI  [ ] MOLST  Goals of care discussion: [ ] yes 
Patient is a 56y old  Male who presents with a chief complaint of EtOH withdrawal (23 Apr 2021 22:08)      INTERVAL HPI/OVERNIGHT EVENTS: Pt seen at bedside, AOx1 today, much more confused today, getting out of bed, does not make any sense when talking to.    MEDICATIONS  (STANDING):  atorvastatin 20 milliGRAM(s) Oral at bedtime  folic acid 1 milliGRAM(s) Oral daily  lactated ringers. 2000 milliLiter(s) (125 mL/Hr) IV Continuous <Continuous>  LORazepam   Injectable 4 milliGRAM(s) IV Push every 6 hours  multivitamin 1 Tablet(s) Oral daily  pantoprazole    Tablet 40 milliGRAM(s) Oral before breakfast  thiamine 100 milliGRAM(s) Oral daily    MEDICATIONS  (PRN):  LORazepam   Injectable 4 milliGRAM(s) IV Push every 1 hour PRN Agitation      Allergies    No Known Allergies    Intolerances        REVIEW OF SYSTEMS:  unable to provide due to ams    Vital Signs Last 24 Hrs  T(C): 37.1 (24 Apr 2021 10:29), Max: 37.2 (23 Apr 2021 19:20)  T(F): 98.7 (24 Apr 2021 10:29), Max: 98.9 (23 Apr 2021 19:20)  HR: 105 (24 Apr 2021 10:29) (95 - 106)  BP: 129/82 (24 Apr 2021 10:29) (107/67 - 136/81)  BP(mean): --  RR: 19 (24 Apr 2021 10:29) (18 - 20)  SpO2: 95% (24 Apr 2021 10:29) (92% - 97%)    PHYSICAL EXAM:  GENERAL: NAD, well-groomed, well-developed  HEAD:  Atraumatic, Normocephalic  EYES: EOMI, PERRLA, conjunctiva and sclera clear  ENMT: No tonsillar erythema, exudates, or enlargement; Moist mucous membranes, Good dentition, No lesions  NECK: Supple, No JVD, Normal thyroid  NERVOUS SYSTEM:  Alert & Oriented X1, CONFUSED  CHEST/LUNG: Clear to percussion bilaterally; No rales, rhonchi, wheezing, or rubs  HEART: Regular rate and rhythm; No murmurs, rubs, or gallops  ABDOMEN: Soft, Nontender, Nondistended; Bowel sounds present  EXTREMITIES:  2+ Peripheral Pulses, No clubbing, cyanosis, or edema  LYMPH: No lymphadenopathy noted  SKIN: No rashes or lesions                            10.0   3.89  )-----------( 213      ( 25 Apr 2021 06:35 )             32.0     04-25    141  |  106  |  7   ----------------------------<  94  3.4<L>   |  28  |  0.81    Ca    8.9      25 Apr 2021 06:35  Mg     2.1     04-25    TPro  7.3  /  Alb  3.3  /  TBili  1.0  /  DBili  x   /  AST  41<H>  /  ALT  26  /  AlkPhos  42  04-24        
Patient is a 56y old  Male who presents with a chief complaint of EtOH withdrawal (23 Apr 2021 22:08)      INTERVAL HPI/OVERNIGHT EVENTS: Pt seen in ICU, sleeping on Precedex gtt, bit more awake today comparing to yesterday.   Was unable to get Speech and Swallow eval performed due to agitation     MEDICATIONS  (STANDING):  atorvastatin 20 milliGRAM(s) Oral at bedtime  chlorhexidine 2% Cloths 1 Application(s) Topical <User Schedule>  dexMEDEtomidine Infusion 0.2 MICROgram(s)/kG/Hr (3.63 mL/Hr) IV Continuous <Continuous>  enoxaparin Injectable 40 milliGRAM(s) SubCutaneous daily  folic acid Injectable 1 milliGRAM(s) IV Push daily  multivitamin 1 Tablet(s) Oral daily  pantoprazole  Injectable 40 milliGRAM(s) IV Push daily  PHENobarbital Injectable 130 milliGRAM(s) IV Push every 8 hours  sucralfate 1 Gram(s) Oral four times a day  thiamine Injectable 100 milliGRAM(s) IV Push daily    MEDICATIONS  (PRN):  LORazepam   Injectable 4 milliGRAM(s) IV Push every 1 hour PRN Agitation  PHENobarbital Injectable 130 milliGRAM(s) IV Push three times a day PRN CIWA >16          Allergies    No Known Allergies    Intolerances        REVIEW OF SYSTEMS:  unable to provide due to ams    ICU Vital Signs Last 24 Hrs  T(C): 36.3 (27 Apr 2021 11:35), Max: 38.3 (26 Apr 2021 19:06)  T(F): 97.4 (27 Apr 2021 11:35), Max: 101 (26 Apr 2021 19:06)  HR: 64 (27 Apr 2021 13:30) (63 - 98)  BP: 137/84 (27 Apr 2021 13:00) (83/58 - 137/84)  BP(mean): 98 (27 Apr 2021 13:00) (62 - 98)  ABP: --  ABP(mean): --  RR: 14 (27 Apr 2021 13:30) (13 - 27)  SpO2: 97% (27 Apr 2021 13:30) (91% - 100%)      PHYSICAL EXAM:  GENERAL: NAD, well-groomed, well-developed  HEAD:  Atraumatic, Normocephalic  EYES: EOMI, PERRLA, conjunctiva and sclera clear  ENMT: No tonsillar erythema, exudates, or enlargement; Moist mucous membranes, Good dentition, No lesions  NECK: Supple, No JVD, Normal thyroid  NERVOUS SYSTEM:  Alert & Oriented X1, CONFUSED  CHEST/LUNG: Clear to percussion bilaterally; No rales, rhonchi, wheezing, or rubs  HEART: Regular rate and rhythm; No murmurs, rubs, or gallops  ABDOMEN: Soft, Nontender, Nondistended; Bowel sounds present  EXTREMITIES:  2+ Peripheral Pulses, No clubbing, cyanosis, or edema  LYMPH: No lymphadenopathy noted  SKIN: No rashes or lesions                                         12.0   9.16  )-----------( 264      ( 27 Apr 2021 05:04 )             39.2     04-27    140  |  108  |  12  ----------------------------<  74  3.8   |  27  |  1.08    Ca    9.6      27 Apr 2021 05:04  Phos  3.1     04-27  Mg     2.2     04-27    TPro  7.5  /  Alb  3.4  /  TBili  0.9  /  DBili  x   /  AST  40<H>  /  ALT  32  /  AlkPhos  50  04-26                        
Patient is a 56y old  Male who presents with a chief complaint of EtOH withdrawal (23 Apr 2021 22:08)      INTERVAL HPI/OVERNIGHT EVENTS: Pt seen in ICU, sleeping on Precedex gtt.   CIWA was 24 last nigt    MEDICATIONS  (STANDING):  atorvastatin 20 milliGRAM(s) Oral at bedtime  chlorhexidine 2% Cloths 1 Application(s) Topical <User Schedule>  dexMEDEtomidine Infusion 0.2 MICROgram(s)/kG/Hr (3.63 mL/Hr) IV Continuous <Continuous>  enoxaparin Injectable 40 milliGRAM(s) SubCutaneous daily  folic acid 1 milliGRAM(s) Oral daily  multivitamin 1 Tablet(s) Oral daily  pantoprazole    Tablet 40 milliGRAM(s) Oral before breakfast  sucralfate 1 Gram(s) Oral four times a day  thiamine 100 milliGRAM(s) Oral daily    MEDICATIONS  (PRN):  LORazepam   Injectable 4 milliGRAM(s) IV Push every 1 hour PRN Agitation  PHENobarbital Injectable 130 milliGRAM(s) IV Push three times a day PRN CIWA >16        Allergies    No Known Allergies    Intolerances        REVIEW OF SYSTEMS:  unable to provide due to ams    ICU Vital Signs Last 24 Hrs  T(C): 34.9 (26 Apr 2021 09:23), Max: 37.1 (25 Apr 2021 22:51)  T(F): 94.9 (26 Apr 2021 09:23), Max: 98.7 (25 Apr 2021 22:51)  HR: 55 (26 Apr 2021 09:00) (45 - 104)  BP: 117/73 (26 Apr 2021 09:00) (59/35 - 158/87)  BP(mean): 82 (26 Apr 2021 09:00) (41 - 120)  ABP: --  ABP(mean): --  RR: 13 (26 Apr 2021 09:00) (13 - 24)  SpO2: 100% (26 Apr 2021 09:00) (96% - 100%)      PHYSICAL EXAM:  GENERAL: NAD, well-groomed, well-developed  HEAD:  Atraumatic, Normocephalic  EYES: EOMI, PERRLA, conjunctiva and sclera clear  ENMT: No tonsillar erythema, exudates, or enlargement; Moist mucous membranes, Good dentition, No lesions  NECK: Supple, No JVD, Normal thyroid  NERVOUS SYSTEM:  Alert & Oriented X1, CONFUSED  CHEST/LUNG: Clear to percussion bilaterally; No rales, rhonchi, wheezing, or rubs  HEART: Regular rate and rhythm; No murmurs, rubs, or gallops  ABDOMEN: Soft, Nontender, Nondistended; Bowel sounds present  EXTREMITIES:  2+ Peripheral Pulses, No clubbing, cyanosis, or edema  LYMPH: No lymphadenopathy noted  SKIN: No rashes or lesions                            10.4   3.45  )-----------( 204      ( 26 Apr 2021 04:31 )             33.8     04-26    139  |  107  |  5<L>  ----------------------------<  123<H>  3.4<L>   |  27  |  0.74    Ca    9.1      26 Apr 2021 04:31  Phos  4.1     04-26  Mg     2.1     04-26    TPro  7.5  /  Alb  3.4  /  TBili  0.9  /  DBili  x   /  AST  40<H>  /  ALT  32  /  AlkPhos  50  04-26                
Patient is a 56y old  Male who presents with a chief complaint of EtOH withdrawal (23 Apr 2021 22:08)      INTERVAL HPI/OVERNIGHT EVENTS: Pt seen at bedside, AOx2, bit confused about location but knows the date today. Sleepy. As per nursing having difficulties swallowing     MEDICATIONS  (STANDING):  atorvastatin 20 milliGRAM(s) Oral at bedtime  folic acid 1 milliGRAM(s) Oral daily  lactated ringers. 2000 milliLiter(s) (125 mL/Hr) IV Continuous <Continuous>  LORazepam   Injectable 4 milliGRAM(s) IV Push every 6 hours  multivitamin 1 Tablet(s) Oral daily  pantoprazole    Tablet 40 milliGRAM(s) Oral before breakfast  thiamine 100 milliGRAM(s) Oral daily    MEDICATIONS  (PRN):  LORazepam   Injectable 4 milliGRAM(s) IV Push every 1 hour PRN Agitation      Allergies    No Known Allergies    Intolerances        REVIEW OF SYSTEMS:  CONSTITUTIONAL: No fever, weight loss, or fatigue  EYES: No eye pain, visual disturbances, or discharge  ENMT:  No difficulty hearing, tinnitus, vertigo; No sinus or throat pain  NECK: No pain or stiffness  BREASTS: No pain, masses, or nipple discharge  RESPIRATORY: No cough, wheezing, chills or hemoptysis; No shortness of breath  CARDIOVASCULAR: No chest pain, palpitations, dizziness, or leg swelling  GASTROINTESTINAL: No abdominal or epigastric pain. No nausea, vomiting, or hematemesis; No diarrhea or constipation. No melena or hematochezia.  GENITOURINARY: No dysuria, frequency, hematuria, or incontinence  NEUROLOGICAL: No headaches, memory loss, loss of strength, numbness, or tremors  SKIN: No itching, burning, rashes, or lesions   LYMPH NODES: No enlarged glands  ENDOCRINE: No heat or cold intolerance; No hair loss  MUSCULOSKELETAL: No joint pain or swelling; No muscle, back, or extremity pain  PSYCHIATRIC: No depression, anxiety, mood swings, or difficulty sleeping  HEME/LYMPH: No easy bruising, or bleeding gums  ALLERGY AND IMMUNOLOGIC: No hives or eczema    Vital Signs Last 24 Hrs  T(C): 37.1 (24 Apr 2021 10:29), Max: 37.2 (23 Apr 2021 19:20)  T(F): 98.7 (24 Apr 2021 10:29), Max: 98.9 (23 Apr 2021 19:20)  HR: 105 (24 Apr 2021 10:29) (95 - 106)  BP: 129/82 (24 Apr 2021 10:29) (107/67 - 136/81)  BP(mean): --  RR: 19 (24 Apr 2021 10:29) (18 - 20)  SpO2: 95% (24 Apr 2021 10:29) (92% - 97%)    PHYSICAL EXAM:  GENERAL: NAD, well-groomed, well-developed  HEAD:  Atraumatic, Normocephalic  EYES: EOMI, PERRLA, conjunctiva and sclera clear  ENMT: No tonsillar erythema, exudates, or enlargement; Moist mucous membranes, Good dentition, No lesions  NECK: Supple, No JVD, Normal thyroid  NERVOUS SYSTEM:  Alert & Oriented X3, Good concentration; Motor Strength 5/5 B/L upper and lower extremities; DTRs 2+ intact and symmetric  CHEST/LUNG: Clear to percussion bilaterally; No rales, rhonchi, wheezing, or rubs  HEART: Regular rate and rhythm; No murmurs, rubs, or gallops  ABDOMEN: Soft, Nontender, Nondistended; Bowel sounds present  EXTREMITIES:  2+ Peripheral Pulses, No clubbing, cyanosis, or edema  LYMPH: No lymphadenopathy noted  SKIN: No rashes or lesions    LABS:                        10.2   3.27  )-----------( 214      ( 24 Apr 2021 09:39 )             32.6     04-24    140  |  105  |  17  ----------------------------<  126<H>  3.8   |  29  |  1.09    Ca    8.3<L>      24 Apr 2021 09:39    TPro  7.3  /  Alb  3.3  /  TBili  1.0  /  DBili  x   /  AST  41<H>  /  ALT  26  /  AlkPhos  42  04-24        CAPILLARY BLOOD GLUCOSE          RADIOLOGY & ADDITIONAL TESTS:    Imaging Personally Reviewed:  [ ] YES  [ ] NO    Consultant(s) Notes Reviewed:  [ ] YES  [ ] NO    Care Discussed with Consultants/Other Providers [ ] YES  [ ] NO

## 2021-04-28 LAB
ALBUMIN SERPL ELPH-MCNC: 2.8 G/DL — LOW (ref 3.3–5)
ALP SERPL-CCNC: 47 U/L — SIGNIFICANT CHANGE UP (ref 40–120)
ALT FLD-CCNC: 27 U/L — SIGNIFICANT CHANGE UP (ref 12–78)
AMMONIA BLD-MCNC: 45 UMOL/L — HIGH (ref 11–32)
ANION GAP SERPL CALC-SCNC: 5 MMOL/L — SIGNIFICANT CHANGE UP (ref 5–17)
AST SERPL-CCNC: 19 U/L — SIGNIFICANT CHANGE UP (ref 15–37)
BILIRUB SERPL-MCNC: 0.4 MG/DL — SIGNIFICANT CHANGE UP (ref 0.2–1.2)
BUN SERPL-MCNC: 7 MG/DL — SIGNIFICANT CHANGE UP (ref 7–23)
CALCIUM SERPL-MCNC: 8.5 MG/DL — SIGNIFICANT CHANGE UP (ref 8.5–10.1)
CHLORIDE SERPL-SCNC: 110 MMOL/L — HIGH (ref 96–108)
CO2 SERPL-SCNC: 25 MMOL/L — SIGNIFICANT CHANGE UP (ref 22–31)
COVID-19 SPIKE DOMAIN AB INTERP: POSITIVE
COVID-19 SPIKE DOMAIN ANTIBODY RESULT: 32.5 U/ML — HIGH
CREAT SERPL-MCNC: 0.67 MG/DL — SIGNIFICANT CHANGE UP (ref 0.5–1.3)
CULTURE RESULTS: SIGNIFICANT CHANGE UP
GLUCOSE SERPL-MCNC: 123 MG/DL — HIGH (ref 70–99)
GRAM STN FLD: SIGNIFICANT CHANGE UP
HCT VFR BLD CALC: 31.9 % — LOW (ref 39–50)
HGB BLD-MCNC: 9.9 G/DL — LOW (ref 13–17)
MCHC RBC-ENTMCNC: 24.9 PG — LOW (ref 27–34)
MCHC RBC-ENTMCNC: 31 GM/DL — LOW (ref 32–36)
MCV RBC AUTO: 80.2 FL — SIGNIFICANT CHANGE UP (ref 80–100)
PLATELET # BLD AUTO: 199 K/UL — SIGNIFICANT CHANGE UP (ref 150–400)
POTASSIUM SERPL-MCNC: 3.1 MMOL/L — LOW (ref 3.5–5.3)
POTASSIUM SERPL-SCNC: 3.1 MMOL/L — LOW (ref 3.5–5.3)
PROT SERPL-MCNC: 7 GM/DL — SIGNIFICANT CHANGE UP (ref 6–8.3)
RBC # BLD: 3.98 M/UL — LOW (ref 4.2–5.8)
RBC # FLD: 17.2 % — HIGH (ref 10.3–14.5)
SARS-COV-2 IGG+IGM SERPL QL IA: 32.5 U/ML — HIGH
SARS-COV-2 IGG+IGM SERPL QL IA: POSITIVE
SODIUM SERPL-SCNC: 140 MMOL/L — SIGNIFICANT CHANGE UP (ref 135–145)
SPECIMEN SOURCE: SIGNIFICANT CHANGE UP
WBC # BLD: 3.22 K/UL — LOW (ref 3.8–10.5)
WBC # FLD AUTO: 3.22 K/UL — LOW (ref 3.8–10.5)

## 2021-04-28 PROCEDURE — 99291 CRITICAL CARE FIRST HOUR: CPT

## 2021-04-28 RX ORDER — POTASSIUM CHLORIDE 20 MEQ
10 PACKET (EA) ORAL
Refills: 0 | Status: DISCONTINUED | OUTPATIENT
Start: 2021-04-28 | End: 2021-04-28

## 2021-04-28 RX ORDER — POTASSIUM CHLORIDE 20 MEQ
10 PACKET (EA) ORAL
Refills: 0 | Status: COMPLETED | OUTPATIENT
Start: 2021-04-28 | End: 2021-04-28

## 2021-04-28 RX ORDER — DEXTROSE MONOHYDRATE, SODIUM CHLORIDE, AND POTASSIUM CHLORIDE 50; .745; 4.5 G/1000ML; G/1000ML; G/1000ML
1000 INJECTION, SOLUTION INTRAVENOUS
Refills: 0 | Status: DISCONTINUED | OUTPATIENT
Start: 2021-04-28 | End: 2021-04-30

## 2021-04-28 RX ORDER — LACTULOSE 10 G/15ML
10 SOLUTION ORAL DAILY
Refills: 0 | Status: DISCONTINUED | OUTPATIENT
Start: 2021-04-28 | End: 2021-04-29

## 2021-04-28 RX ORDER — POTASSIUM CHLORIDE 20 MEQ
10 PACKET (EA) ORAL ONCE
Refills: 0 | Status: DISCONTINUED | OUTPATIENT
Start: 2021-04-28 | End: 2021-04-28

## 2021-04-28 RX ORDER — PHENOBARBITAL 60 MG
130 TABLET ORAL ONCE
Refills: 0 | Status: DISCONTINUED | OUTPATIENT
Start: 2021-04-28 | End: 2021-04-28

## 2021-04-28 RX ADMIN — DEXTROSE MONOHYDRATE, SODIUM CHLORIDE, AND POTASSIUM CHLORIDE 100 MILLILITER(S): 50; .745; 4.5 INJECTION, SOLUTION INTRAVENOUS at 22:33

## 2021-04-28 RX ADMIN — DEXTROSE MONOHYDRATE, SODIUM CHLORIDE, AND POTASSIUM CHLORIDE 100 MILLILITER(S): 50; .745; 4.5 INJECTION, SOLUTION INTRAVENOUS at 12:19

## 2021-04-28 RX ADMIN — LACTULOSE 10 GRAM(S): 10 SOLUTION ORAL at 13:20

## 2021-04-28 RX ADMIN — DEXMEDETOMIDINE HYDROCHLORIDE IN 0.9% SODIUM CHLORIDE 8.25 MICROGRAM(S)/KG/HR: 4 INJECTION INTRAVENOUS at 07:25

## 2021-04-28 RX ADMIN — SODIUM CHLORIDE 100 MILLILITER(S): 9 INJECTION, SOLUTION INTRAVENOUS at 00:26

## 2021-04-28 RX ADMIN — SODIUM CHLORIDE 100 MILLILITER(S): 9 INJECTION, SOLUTION INTRAVENOUS at 07:25

## 2021-04-28 RX ADMIN — DEXTROSE MONOHYDRATE, SODIUM CHLORIDE, AND POTASSIUM CHLORIDE 100 MILLILITER(S): 50; .745; 4.5 INJECTION, SOLUTION INTRAVENOUS at 17:34

## 2021-04-28 RX ADMIN — Medication 100 MILLIEQUIVALENT(S): at 11:41

## 2021-04-28 RX ADMIN — HEPARIN SODIUM 5000 UNIT(S): 5000 INJECTION INTRAVENOUS; SUBCUTANEOUS at 06:14

## 2021-04-28 RX ADMIN — DEXMEDETOMIDINE HYDROCHLORIDE IN 0.9% SODIUM CHLORIDE 8.25 MICROGRAM(S)/KG/HR: 4 INJECTION INTRAVENOUS at 13:20

## 2021-04-28 RX ADMIN — Medication 100 MILLIEQUIVALENT(S): at 10:37

## 2021-04-28 RX ADMIN — DEXMEDETOMIDINE HYDROCHLORIDE IN 0.9% SODIUM CHLORIDE 8.25 MICROGRAM(S)/KG/HR: 4 INJECTION INTRAVENOUS at 21:05

## 2021-04-28 RX ADMIN — Medication 100 MILLIEQUIVALENT(S): at 09:43

## 2021-04-28 RX ADMIN — HEPARIN SODIUM 5000 UNIT(S): 5000 INJECTION INTRAVENOUS; SUBCUTANEOUS at 13:20

## 2021-04-28 RX ADMIN — PANTOPRAZOLE SODIUM 40 MILLIGRAM(S): 20 TABLET, DELAYED RELEASE ORAL at 09:48

## 2021-04-28 RX ADMIN — HEPARIN SODIUM 5000 UNIT(S): 5000 INJECTION INTRAVENOUS; SUBCUTANEOUS at 21:05

## 2021-04-28 RX ADMIN — Medication 130 MILLIGRAM(S): at 13:42

## 2021-04-28 RX ADMIN — DEXMEDETOMIDINE HYDROCHLORIDE IN 0.9% SODIUM CHLORIDE 8.25 MICROGRAM(S)/KG/HR: 4 INJECTION INTRAVENOUS at 17:33

## 2021-04-28 NOTE — DIETITIAN INITIAL EVALUATION ADULT. - OTHER INFO
55yo male with PMH significant for chronic ETOH abuse with h/o ETOH withdrawal and DT's, alcoholic gastritis/esophagitis, PUD, HLD, hypoxic resp failure requiring intubation 2/2 aspiration PNA p/w abd pain and vomiting x 3 days.  Pt initially admitted for ETOH withdrawal syndrome without complication, chronic alcoholic gastritis without hemorrhage.  Pt transferred to [facility] with DT's with gastritis.

## 2021-04-28 NOTE — DIETITIAN INITIAL EVALUATION ADULT. - ORAL INTAKE PTA/DIET HISTORY
pt transferred from [facility]; pt has been admitted since 4/23, minimal PO 2/2 mental status x 5 days, meeting <50% of estimated nutr needs. Pt likely with meeting <50% of nutr needs x 8 days due to vomiting. RN reported concerns of aspiration.

## 2021-04-28 NOTE — DIETITIAN NUTRITION RISK NOTIFICATION - ADDITIONAL COMMENTS/DIETITIAN RECOMMENDATIONS
1) if pt unable to take adequate PO intake, consider corpak placement and start of TF; re-consult for rec's prn   2) in v/o h/o aspiration, consider SLP eval prn (was pending prior to transfer)   3) change thiamine and folic acid to IV as pt is unable to take PO meds at this time   4) monitor BM; add bowel regimen prn   5) daily wt checks to track/trend changes 1) if pt unable to take adequate PO intake, consider EN vs PN; re-consult for rec's prn   2) in v/o h/o aspiration, consider SLP eval prn (was pending prior to transfer)   3) change thiamine and folic acid to IV as pt is unable to take PO meds at this time   4) monitor BM; add bowel regimen prn   5) daily wt checks to track/trend changes

## 2021-04-28 NOTE — DIETITIAN INITIAL EVALUATION ADULT. - PHYSCIAL ASSESSMENT
ale score of 14; no PU documented  no BM documented x 6 days; not on bowel regimen; add bowel regimen. overweight

## 2021-04-28 NOTE — DIETITIAN INITIAL EVALUATION ADULT. - PERTINENT LABORATORY DATA
04-28    140  |  110<H>  |  7   ----------------------------<  123<H>  3.1<L>   |  25  |  0.67    Ca    8.5      28 Apr 2021 06:01  Phos  3.1     04-27  Mg     2.2     04-27    TPro  7.0  /  Alb  2.8<L>  /  TBili  0.4  /  DBili  x   /  AST  19  /  ALT  27  /  AlkPhos  47  04-28

## 2021-04-28 NOTE — DIETITIAN INITIAL EVALUATION ADULT. - ADD RECOMMEND
1) if pt unable to take adequate PO intake, consider corpak placement and start of TF; re-consult for rec's prn 2) in v/o h/o aspiration, consider SLP eval prn (was pending prior to transfer) 3) change thiamine and folic acid to IV as pt is unable to take PO meds at this time 4) monitor BM; add bowel regimen prn 5) daily wt checks to track/trend changes 1) if pt unable to take adequate PO intake, consider nutr support, PN vs EN; re-consult for rec's prn 2) in v/o h/o aspiration, consider SLP eval prn (was pending prior to transfer) 3) change thiamine and folic acid to IV as pt is unable to take PO meds at this time 4) monitor BM; add bowel regimen prn 5) daily wt checks to track/trend changes

## 2021-04-28 NOTE — DIETITIAN INITIAL EVALUATION ADULT. - MALNUTRITION
severe malnutrition in acute illness severe malnutrition in acute illness r/t decreased PO intake 2/2 DT's 2/2 ETOH abuse AEB 5% wt loss x 5 days; meeting <50% of ENN x 8 days; mild edema

## 2021-04-28 NOTE — DIETITIAN INITIAL EVALUATION ADULT. - PERTINENT MEDS FT
MEDICATIONS  (STANDING):  dexMEDEtomidine Infusion 0.5 MICROgram(s)/kG/Hr (8.25 mL/Hr) IV Continuous <Continuous>  dextrose 5% + sodium chloride 0.9%. 1000 milliLiter(s) (100 mL/Hr) IV Continuous <Continuous>  folic acid 1 milliGRAM(s) Oral daily  heparin   Injectable 5000 Unit(s) SubCutaneous every 8 hours  pantoprazole  Injectable 40 milliGRAM(s) IV Push daily  potassium chloride  10 mEq/100 mL IVPB 10 milliEquivalent(s) IV Intermittent every 1 hour  thiamine 100 milliGRAM(s) Oral daily    MEDICATIONS  (PRN):

## 2021-04-29 LAB
ALBUMIN SERPL ELPH-MCNC: 3.2 G/DL — LOW (ref 3.3–5)
ALP SERPL-CCNC: 51 U/L — SIGNIFICANT CHANGE UP (ref 40–120)
ALT FLD-CCNC: 21 U/L — SIGNIFICANT CHANGE UP (ref 12–78)
AMMONIA BLD-MCNC: 28 UMOL/L — SIGNIFICANT CHANGE UP (ref 11–32)
ANION GAP SERPL CALC-SCNC: 7 MMOL/L — SIGNIFICANT CHANGE UP (ref 5–17)
AST SERPL-CCNC: 20 U/L — SIGNIFICANT CHANGE UP (ref 15–37)
BILIRUB SERPL-MCNC: 0.5 MG/DL — SIGNIFICANT CHANGE UP (ref 0.2–1.2)
BUN SERPL-MCNC: 5 MG/DL — LOW (ref 7–23)
CALCIUM SERPL-MCNC: 9.7 MG/DL — SIGNIFICANT CHANGE UP (ref 8.5–10.1)
CHLORIDE SERPL-SCNC: 110 MMOL/L — HIGH (ref 96–108)
CO2 SERPL-SCNC: 21 MMOL/L — LOW (ref 22–31)
CREAT SERPL-MCNC: 0.83 MG/DL — SIGNIFICANT CHANGE UP (ref 0.5–1.3)
GLUCOSE SERPL-MCNC: 118 MG/DL — HIGH (ref 70–99)
HCT VFR BLD CALC: 34.5 % — LOW (ref 39–50)
HGB BLD-MCNC: 10.7 G/DL — LOW (ref 13–17)
MCHC RBC-ENTMCNC: 24.8 PG — LOW (ref 27–34)
MCHC RBC-ENTMCNC: 31 GM/DL — LOW (ref 32–36)
MCV RBC AUTO: 79.9 FL — LOW (ref 80–100)
PLATELET # BLD AUTO: 204 K/UL — SIGNIFICANT CHANGE UP (ref 150–400)
POTASSIUM SERPL-MCNC: 3.8 MMOL/L — SIGNIFICANT CHANGE UP (ref 3.5–5.3)
POTASSIUM SERPL-SCNC: 3.8 MMOL/L — SIGNIFICANT CHANGE UP (ref 3.5–5.3)
PROT SERPL-MCNC: 7.9 GM/DL — SIGNIFICANT CHANGE UP (ref 6–8.3)
RBC # BLD: 4.32 M/UL — SIGNIFICANT CHANGE UP (ref 4.2–5.8)
RBC # FLD: 17.5 % — HIGH (ref 10.3–14.5)
SODIUM SERPL-SCNC: 138 MMOL/L — SIGNIFICANT CHANGE UP (ref 135–145)
WBC # BLD: 4.6 K/UL — SIGNIFICANT CHANGE UP (ref 3.8–10.5)
WBC # FLD AUTO: 4.6 K/UL — SIGNIFICANT CHANGE UP (ref 3.8–10.5)

## 2021-04-29 PROCEDURE — 99291 CRITICAL CARE FIRST HOUR: CPT

## 2021-04-29 RX ORDER — PHENOBARBITAL 60 MG
130 TABLET ORAL ONCE
Refills: 0 | Status: DISCONTINUED | OUTPATIENT
Start: 2021-04-29 | End: 2021-04-29

## 2021-04-29 RX ADMIN — HEPARIN SODIUM 5000 UNIT(S): 5000 INJECTION INTRAVENOUS; SUBCUTANEOUS at 13:26

## 2021-04-29 RX ADMIN — DEXMEDETOMIDINE HYDROCHLORIDE IN 0.9% SODIUM CHLORIDE 8.25 MICROGRAM(S)/KG/HR: 4 INJECTION INTRAVENOUS at 19:44

## 2021-04-29 RX ADMIN — DEXTROSE MONOHYDRATE, SODIUM CHLORIDE, AND POTASSIUM CHLORIDE 100 MILLILITER(S): 50; .745; 4.5 INJECTION, SOLUTION INTRAVENOUS at 13:32

## 2021-04-29 RX ADMIN — DEXMEDETOMIDINE HYDROCHLORIDE IN 0.9% SODIUM CHLORIDE 8.25 MICROGRAM(S)/KG/HR: 4 INJECTION INTRAVENOUS at 05:12

## 2021-04-29 RX ADMIN — Medication 2 MILLIGRAM(S): at 14:47

## 2021-04-29 RX ADMIN — DEXMEDETOMIDINE HYDROCHLORIDE IN 0.9% SODIUM CHLORIDE 8.25 MICROGRAM(S)/KG/HR: 4 INJECTION INTRAVENOUS at 13:28

## 2021-04-29 RX ADMIN — DEXMEDETOMIDINE HYDROCHLORIDE IN 0.9% SODIUM CHLORIDE 8.25 MICROGRAM(S)/KG/HR: 4 INJECTION INTRAVENOUS at 17:19

## 2021-04-29 RX ADMIN — PANTOPRAZOLE SODIUM 40 MILLIGRAM(S): 20 TABLET, DELAYED RELEASE ORAL at 09:13

## 2021-04-29 RX ADMIN — DEXMEDETOMIDINE HYDROCHLORIDE IN 0.9% SODIUM CHLORIDE 8.25 MICROGRAM(S)/KG/HR: 4 INJECTION INTRAVENOUS at 23:34

## 2021-04-29 RX ADMIN — DEXTROSE MONOHYDRATE, SODIUM CHLORIDE, AND POTASSIUM CHLORIDE 100 MILLILITER(S): 50; .745; 4.5 INJECTION, SOLUTION INTRAVENOUS at 06:48

## 2021-04-29 RX ADMIN — DEXMEDETOMIDINE HYDROCHLORIDE IN 0.9% SODIUM CHLORIDE 8.25 MICROGRAM(S)/KG/HR: 4 INJECTION INTRAVENOUS at 01:32

## 2021-04-29 RX ADMIN — HEPARIN SODIUM 5000 UNIT(S): 5000 INJECTION INTRAVENOUS; SUBCUTANEOUS at 21:38

## 2021-04-29 RX ADMIN — HEPARIN SODIUM 5000 UNIT(S): 5000 INJECTION INTRAVENOUS; SUBCUTANEOUS at 05:12

## 2021-04-29 RX ADMIN — Medication 130 MILLIGRAM(S): at 08:26

## 2021-04-29 RX ADMIN — DEXMEDETOMIDINE HYDROCHLORIDE IN 0.9% SODIUM CHLORIDE 8.25 MICROGRAM(S)/KG/HR: 4 INJECTION INTRAVENOUS at 07:50

## 2021-04-29 RX ADMIN — DEXMEDETOMIDINE HYDROCHLORIDE IN 0.9% SODIUM CHLORIDE 8.25 MICROGRAM(S)/KG/HR: 4 INJECTION INTRAVENOUS at 09:13

## 2021-04-29 NOTE — CHART NOTE - NSCHARTNOTEFT_GEN_A_CORE
Clinical Nutrition BRIEF NOTE    *55yo male admitted with abd pain and increased ETOH level; transferred to  for DT's.  Also, being managed for gastritis.    *labs reviewed; 04-29    138  |  110<H>  |  5<L>  ----------------------------<  118<H>  3.8   |  21<L>  |  0.83    Ca    9.7      29 Apr 2021 06:23    TPro  7.9  /  Alb  3.2<L>  /  TBili  0.5  /  DBili  x   /  AST  20  /  ALT  21  /  AlkPhos  51  04-29    *lytes WNL.  LFT and bilirubin WNL.    *I&O's Detail    28 Apr 2021 07:01  -  29 Apr 2021 07:00  --------------------------------------------------------  IN:    Dexmedetomidine: 509 mL    dextrose 5% + sodium chloride 0.45% w/ Additives: 1571 mL    IV PiggyBack: 300 mL    Oral Fluid: 720 mL  Total IN: 3100 mL    OUT:    Incontinent per Condom Catheter (mL): 3000 mL  Total OUT: 3000 mL    Total NET: 100 mL    *positive fluid balance; pt with very limited PO intake, per RN attempted to give him juice, however, pt unable to follow commands.  pt with h/o aspiration PNA, rec'd no PO intake until SLP eval can be completed.    *ale score of 11; no PU documented. (+2) generalized, (+3) Rt wrist and hand edema documented. no BM x 8 days.    *pt with minimal PO intake x 3 days PTA 2/2 vomiting.  Pt with minimal intake since admission to [facility] due to mental status.  Pt meeting <50% of estimated nutr needs x 10 days.  d/w Dr. Skinner to start nutrition support (EN vs PN).  Pt's mental status remains poor with increased agitation and inability to follow commands.  continue to recommend nutrition support until pt's mental status is improved, SLP is able to eval pt for safe PO intake (pt with h/o aspiration PNA), and pt is meeting >50% of estimated nutr needs by PO diet.    *pt continues to meet criteria for severe malnutrition in acute illness*    RECOMMENDATIONS:  1) start nutrition support; re-consult RD for recommendations  2) monitor length NPO, advancement/tolerance nutr source  3) keep NPO until SLP eval can be completed (pt with h/o aspiration PNA)  4) obtain new wt daily to track/trend changes    ESTIMATED NUTR NEEDS: based on admit wt of 69Kg  1725-2070Kcal (25-30Kcal/Kg)  97-110g protein (1.4-1.6g/Kg)  1725-2070mL (25-30mL/Kg)    Wt Hx:  72.5Kg (4/24/21)  Height (cm): 170.2 (04-23)  Weight (kg): 68.9 (04-27), 72.6 (04-23)  BMI (kg/m2): 23.8 (04-27), 25.1 (04-23)  Ideal Body Weight: 67Kg  % Ideal Body Weight: 103%    *will continue to monitor and adjust treatment plan prn*  Altagracia Chavez MA, RDN, CDN, CNSC: [phone] Clinical Nutrition BRIEF NOTE    *57yo male admitted with abd pain and increased ETOH level; transferred to  for DT's.  Also, being managed for gastritis.    *labs reviewed; 04-29    138  |  110<H>  |  5<L>  ----------------------------<  118<H>  3.8   |  21<L>  |  0.83    Ca    9.7      29 Apr 2021 06:23    TPro  7.9  /  Alb  3.2<L>  /  TBili  0.5  /  DBili  x   /  AST  20  /  ALT  21  /  AlkPhos  51  04-29    *lytes WNL.  LFT and bilirubin WNL.    *I&O's Detail    28 Apr 2021 07:01  -  29 Apr 2021 07:00  --------------------------------------------------------  IN:    Dexmedetomidine: 509 mL    dextrose 5% + sodium chloride 0.45% w/ Additives: 1571 mL    IV PiggyBack: 300 mL    Oral Fluid: 720 mL  Total IN: 3100 mL    OUT:    Incontinent per Condom Catheter (mL): 3000 mL  Total OUT: 3000 mL    Total NET: 100 mL    *positive fluid balance; pt with very limited PO intake, per RN attempted to give him juice, however, pt unable to follow commands.  pt with h/o aspiration PNA, rec'd no PO intake until SLP eval can be completed.    *ale score of 11; no PU documented. (+2) generalized, (+3) Rt wrist and hand edema documented. no BM x 8 days.    *pt with minimal PO intake x 3 days PTA 2/2 vomiting.  Pt with minimal intake since admission to [facility] due to mental status.  Pt meeting <50% of estimated nutr needs x 10 days.  d/w Dr. Skinner to start nutrition support (EN vs PN).  Pt's mental status remains poor with increased agitation and inability to follow commands.  continue to recommend nutrition support until pt's mental status is improved, SLP is able to eval pt for safe PO intake (pt with h/o aspiration PNA), and pt is meeting >50% of estimated nutr needs by PO diet.    *pt continues to meet criteria for severe malnutrition in acute illness*  Pt meeting <50% of estimated nutr needs x 10 days.  d/w MD x 2 days nutrition recommendations.  will continue to monitor status and adjust recommendations prn.    RECOMMENDATIONS:  1) start nutrition support; re-consult RD for recommendations  2) monitor length NPO, advancement/tolerance nutr source  3) keep NPO until SLP eval can be completed (pt with h/o aspiration PNA)  4) obtain new wt daily to track/trend changes    ESTIMATED NUTR NEEDS: based on admit wt of 69Kg  1725-2070Kcal (25-30Kcal/Kg)  97-110g protein (1.4-1.6g/Kg)  1725-2070mL (25-30mL/Kg)    Wt Hx:  72.5Kg (4/24/21)  Height (cm): 170.2 (04-23)  Weight (kg): 68.9 (04-27), 72.6 (04-23)  BMI (kg/m2): 23.8 (04-27), 25.1 (04-23)  Ideal Body Weight: 67Kg  % Ideal Body Weight: 103%    *will continue to monitor and adjust treatment plan prn*  Altagracia Chavez MA, RDN, CDN, CNSC: [phone]

## 2021-04-30 LAB
ALBUMIN SERPL ELPH-MCNC: 2.5 G/DL — LOW (ref 3.3–5)
ALP SERPL-CCNC: 46 U/L — SIGNIFICANT CHANGE UP (ref 40–120)
ALT FLD-CCNC: 21 U/L — SIGNIFICANT CHANGE UP (ref 12–78)
ANION GAP SERPL CALC-SCNC: 7 MMOL/L — SIGNIFICANT CHANGE UP (ref 5–17)
AST SERPL-CCNC: 21 U/L — SIGNIFICANT CHANGE UP (ref 15–37)
BILIRUB SERPL-MCNC: 0.6 MG/DL — SIGNIFICANT CHANGE UP (ref 0.2–1.2)
BUN SERPL-MCNC: 5 MG/DL — LOW (ref 7–23)
CALCIUM SERPL-MCNC: 9 MG/DL — SIGNIFICANT CHANGE UP (ref 8.5–10.1)
CHLORIDE SERPL-SCNC: 110 MMOL/L — HIGH (ref 96–108)
CO2 SERPL-SCNC: 16 MMOL/L — LOW (ref 22–31)
CREAT SERPL-MCNC: 0.97 MG/DL — SIGNIFICANT CHANGE UP (ref 0.5–1.3)
GLUCOSE SERPL-MCNC: 89 MG/DL — SIGNIFICANT CHANGE UP (ref 70–99)
POTASSIUM SERPL-MCNC: 3.7 MMOL/L — SIGNIFICANT CHANGE UP (ref 3.5–5.3)
POTASSIUM SERPL-SCNC: 3.7 MMOL/L — SIGNIFICANT CHANGE UP (ref 3.5–5.3)
PROT SERPL-MCNC: 7.3 GM/DL — SIGNIFICANT CHANGE UP (ref 6–8.3)
SODIUM SERPL-SCNC: 133 MMOL/L — LOW (ref 135–145)

## 2021-04-30 PROCEDURE — 99233 SBSQ HOSP IP/OBS HIGH 50: CPT

## 2021-04-30 RX ORDER — MIDODRINE HYDROCHLORIDE 2.5 MG/1
10 TABLET ORAL THREE TIMES A DAY
Refills: 0 | Status: DISCONTINUED | OUTPATIENT
Start: 2021-04-30 | End: 2021-05-01

## 2021-04-30 RX ORDER — SODIUM CHLORIDE 9 MG/ML
500 INJECTION INTRAMUSCULAR; INTRAVENOUS; SUBCUTANEOUS ONCE
Refills: 0 | Status: COMPLETED | OUTPATIENT
Start: 2021-04-30 | End: 2021-04-30

## 2021-04-30 RX ORDER — ACETAMINOPHEN 500 MG
650 TABLET ORAL EVERY 6 HOURS
Refills: 0 | Status: DISCONTINUED | OUTPATIENT
Start: 2021-04-30 | End: 2021-05-02

## 2021-04-30 RX ADMIN — DEXTROSE MONOHYDRATE, SODIUM CHLORIDE, AND POTASSIUM CHLORIDE 100 MILLILITER(S): 50; .745; 4.5 INJECTION, SOLUTION INTRAVENOUS at 02:13

## 2021-04-30 RX ADMIN — MIDODRINE HYDROCHLORIDE 10 MILLIGRAM(S): 2.5 TABLET ORAL at 22:13

## 2021-04-30 RX ADMIN — Medication 1 MILLIGRAM(S): at 12:50

## 2021-04-30 RX ADMIN — Medication 650 MILLIGRAM(S): at 08:45

## 2021-04-30 RX ADMIN — SODIUM CHLORIDE 1000 MILLILITER(S): 9 INJECTION INTRAMUSCULAR; INTRAVENOUS; SUBCUTANEOUS at 13:16

## 2021-04-30 RX ADMIN — HEPARIN SODIUM 5000 UNIT(S): 5000 INJECTION INTRAVENOUS; SUBCUTANEOUS at 06:35

## 2021-04-30 RX ADMIN — Medication 650 MILLIGRAM(S): at 08:15

## 2021-04-30 RX ADMIN — DEXTROSE MONOHYDRATE, SODIUM CHLORIDE, AND POTASSIUM CHLORIDE 100 MILLILITER(S): 50; .745; 4.5 INJECTION, SOLUTION INTRAVENOUS at 12:50

## 2021-04-30 RX ADMIN — DEXMEDETOMIDINE HYDROCHLORIDE IN 0.9% SODIUM CHLORIDE 8.25 MICROGRAM(S)/KG/HR: 4 INJECTION INTRAVENOUS at 06:35

## 2021-04-30 RX ADMIN — PANTOPRAZOLE SODIUM 40 MILLIGRAM(S): 20 TABLET, DELAYED RELEASE ORAL at 12:50

## 2021-04-30 RX ADMIN — MIDODRINE HYDROCHLORIDE 10 MILLIGRAM(S): 2.5 TABLET ORAL at 13:40

## 2021-04-30 RX ADMIN — DEXMEDETOMIDINE HYDROCHLORIDE IN 0.9% SODIUM CHLORIDE 8.25 MICROGRAM(S)/KG/HR: 4 INJECTION INTRAVENOUS at 03:34

## 2021-04-30 RX ADMIN — HEPARIN SODIUM 5000 UNIT(S): 5000 INJECTION INTRAVENOUS; SUBCUTANEOUS at 22:13

## 2021-04-30 RX ADMIN — Medication 100 MILLIGRAM(S): at 12:50

## 2021-04-30 RX ADMIN — HEPARIN SODIUM 5000 UNIT(S): 5000 INJECTION INTRAVENOUS; SUBCUTANEOUS at 13:17

## 2021-05-01 PROCEDURE — 99233 SBSQ HOSP IP/OBS HIGH 50: CPT

## 2021-05-01 RX ORDER — KETOROLAC TROMETHAMINE 30 MG/ML
15 SYRINGE (ML) INJECTION ONCE
Refills: 0 | Status: DISCONTINUED | OUTPATIENT
Start: 2021-05-01 | End: 2021-05-01

## 2021-05-01 RX ORDER — LACTULOSE 10 G/15ML
10 SOLUTION ORAL ONCE
Refills: 0 | Status: COMPLETED | OUTPATIENT
Start: 2021-05-01 | End: 2021-05-01

## 2021-05-01 RX ADMIN — HEPARIN SODIUM 5000 UNIT(S): 5000 INJECTION INTRAVENOUS; SUBCUTANEOUS at 06:13

## 2021-05-01 RX ADMIN — Medication 1 MILLIGRAM(S): at 10:24

## 2021-05-01 RX ADMIN — Medication 650 MILLIGRAM(S): at 17:28

## 2021-05-01 RX ADMIN — Medication 100 MILLIGRAM(S): at 10:24

## 2021-05-01 RX ADMIN — Medication 650 MILLIGRAM(S): at 19:44

## 2021-05-01 RX ADMIN — DEXMEDETOMIDINE HYDROCHLORIDE IN 0.9% SODIUM CHLORIDE 8.25 MICROGRAM(S)/KG/HR: 4 INJECTION INTRAVENOUS at 01:14

## 2021-05-01 RX ADMIN — HEPARIN SODIUM 5000 UNIT(S): 5000 INJECTION INTRAVENOUS; SUBCUTANEOUS at 22:33

## 2021-05-01 RX ADMIN — Medication 15 MILLIGRAM(S): at 23:11

## 2021-05-01 RX ADMIN — MIDODRINE HYDROCHLORIDE 10 MILLIGRAM(S): 2.5 TABLET ORAL at 06:13

## 2021-05-01 RX ADMIN — HEPARIN SODIUM 5000 UNIT(S): 5000 INJECTION INTRAVENOUS; SUBCUTANEOUS at 13:48

## 2021-05-01 RX ADMIN — LACTULOSE 10 GRAM(S): 10 SOLUTION ORAL at 12:18

## 2021-05-01 NOTE — PROGRESS NOTE ADULT - MENTAL STATUS
non focal, stated in name, slightly confused
agitated, yelling
much improved, slightly confused
non focal , confused
confused, conversant, non focal moves all extremities

## 2021-05-01 NOTE — PROGRESS NOTE ADULT - CARDIOVASCULAR
Regular rate & rhythm, normal S1, S2; no murmurs, gallops or rubs; no S3, S4
detailed exam
Regular rate & rhythm, normal S1, S2; no murmurs, gallops or rubs; no S3, S4

## 2021-05-01 NOTE — PROGRESS NOTE ADULT - GASTROINTESTINAL
Soft, non-tender, no hepatosplenomegaly, normal bowel sounds
detailed exam
detailed exam

## 2021-05-01 NOTE — PROGRESS NOTE ADULT - NUTRITIONAL ASSESSMENT
This patient has been assessed with a concern for Malnutrition and has been determined to have a diagnosis/diagnoses of Severe protein-calorie malnutrition.    This patient is being managed with:   Diet Regular-  Entered: Apr 27 2021  4:29PM    

## 2021-05-01 NOTE — PROGRESS NOTE ADULT - SUBJECTIVE AND OBJECTIVE BOX
Patient received in transfer from [place] for load rebalancing    HPI:      Patient is a 56M with a PMH of chronic ETOH abuse with hx of alcohol withdrawal and DTs with frequent hospital admissions, alcoholic gastritis/esophagitis, PUD, HLD,    requiring intubation due to aspiration PNA who presents to the ED for abdominal pain and vomiting.     Noted to have brown vomitus in ED In ed he had ct which showed hepatosteatosis.  Tachycardic in ED to 127.  Labs show significant lactic acidosis.  Blood alcohol level of 296.    Patient was admitted and  was transferred to ICU and started on phenobarbital and precedex.  Patient transferred to  today for load rebalancing  On arrival awake, slightly confused, but answering question on precedex trip, slightly agitated    Review of Systems:  Review of Systems: unable to obtain       MEDICATIONS  (PRN):    ICU Vital Signs Last 24 Hrs  T(C): 36.3 (27 Apr 2021 11:35), Max: 38.3 (26 Apr 2021 19:06)  T(F): 97.4 (27 Apr 2021 11:35), Max: 101 (26 Apr 2021 19:06)  HR: 72 (27 Apr 2021 14:00) (63 - 98)  BP: 111/78 (27 Apr 2021 14:00) (83/58 - 137/84)  BP(mean): 85 (27 Apr 2021 14:00) (62 - 98)  ABP: --  ABP(mean): --  RR: 20 (27 Apr 2021 14:00) (13 - 27)  SpO2: 96% (27 Apr 2021 14:00) (92% - 98%)    I&O's Summary                        12.0   9.16  )-----------( 264      ( 27 Apr 2021 05:04 )             39.2       04-27    140  |  108  |  12  ----------------------------<  74  3.8   |  27  |  1.08    Ca    9.6      27 Apr 2021 05:04  Phos  3.1     04-27  Mg     2.2     04-27    TPro  7.5  /  Alb  3.4  /  TBili  0.9  /  DBili  x   /  AST  40<H>  /  ALT  32  /  AlkPhos  50  04-26    ABG - ( 26 Apr 2021 09:05 )  pH, Arterial: x     pH, Blood: 7.40  /  pCO2: 43    /  pO2: 111   / HCO3: 28    / Base Excess: 1.2   /  SaO2: 98        DVT Prophylaxis:  venodynes, heparin subq                                                               Advanced Directives: Full Code                     
Events Overnight: patient still confused with episodes of agitation    HPI:        Patient is a 56M with a PMH of chronic ETOH abuse with hx of alcohol withdrawal and DTs with frequent hospital admissions, alcoholic gastritis/esophagitis, PUD, HLD,    requiring intubation due to aspiration PNA who presents to the ED for abdominal pain and vomiting.     Noted to have brown vomitus in ED In ed he had ct which showed hepatosteatosis.  Tachycardic in ED to 127.  Labs show significant lactic acidosis.  Blood alcohol level of 296.    Patient was admitted and  was transferred to ICU and started on phenobarbital and precedex.  Patient transferred to  9/27 for load rebalancing  On arrival awake, slightly confused, but answering question on precedex dtrip, slightly agitated  4/29 - given additional dose of phenobarbitol, for agitation    Review of Systems:  Review of Systems: unable to obtain     MEDICATIONS  (STANDING):  dexMEDEtomidine Infusion 0.5 MICROgram(s)/kG/Hr (8.25 mL/Hr) IV Continuous <Continuous>  dextrose 5% + sodium chloride 0.45% with potassium chloride 20 mEq/L 1000 milliLiter(s) (100 mL/Hr) IV Continuous <Continuous>  folic acid 1 milliGRAM(s) Oral daily  heparin   Injectable 5000 Unit(s) SubCutaneous every 8 hours  lactulose Syrup 10 Gram(s) Oral daily  pantoprazole  Injectable 40 milliGRAM(s) IV Push daily  thiamine 100 milliGRAM(s) Oral daily    MEDICATIONS  (PRN):    ICU Vital Signs Last 24 Hrs  T(C): 36.7 (29 Apr 2021 04:00), Max: 36.8 (28 Apr 2021 14:00)  T(F): 98.1 (29 Apr 2021 04:00), Max: 98.2 (28 Apr 2021 14:00)  HR: 71 (29 Apr 2021 08:30) (60 - 81)  BP: 141/87 (29 Apr 2021 08:30) (90/62 - 144/87)  BP(mean): 100 (29 Apr 2021 08:30) (69 - 100)  ABP: --  ABP(mean): --  RR: 18 (29 Apr 2021 08:30) (14 - 22)  SpO2: 98% (29 Apr 2021 08:30) (94% - 100%)      I&O's Summary    28 Apr 2021 07:01 - 29 Apr 2021 07:00  --------------------------------------------------------  IN: 3100 mL / OUT: 3000 mL / NET: 100 mL                        10.7   4.60  )-----------( 204      ( 29 Apr 2021 06:23 )             34.5       04-29    138  |  110<H>  |  5<L>  ----------------------------<  118<H>  3.8   |  21<L>  |  0.83    Ca    9.7      29 Apr 2021 06:23    TPro  7.9  /  Alb  3.2<L>  /  TBili  0.5  /  DBili  x   /  AST  20  /  ALT  21  /  AlkPhos  51  04-29    DVT Prophylaxis:  Heparin subq                                                               Advanced Directives: Full code                     
Events Overnight: patient on precedex, but more cooperative, has not required additional phenobarbitol    HPI:      Patient is a 56M with a PMH of chronic ETOH abuse with hx of alcohol withdrawal and DTs with frequent hospital admissions, alcoholic gastritis/esophagitis, PUD, HLD,    requiring intubation due to aspiration PNA who presents to the ED for abdominal pain and vomiting.   Noted to have brown vomitus in ED In ed he had ct which showed hepatosteatosis.   Labs s  Blood alcohol level of 296.    Patient was admitted and  was transferred to ICU and started on phenobarbital and precedex.  Patient transferred to  9/27 for load rebalancing  On arrival awake, slightly confused, but answering question on precedex trip, slightly agitated  over past few days has required increased doses of phenobarb, last 24 hours seems to be improveing    Review of Systems:  Review of Systems: unable to obtain     MEDICATIONS  (STANDING):  dexMEDEtomidine Infusion 0.5 MICROgram(s)/kG/Hr (8.25 mL/Hr) IV Continuous <Continuous>  dextrose 5% + sodium chloride 0.45% with potassium chloride 20 mEq/L 1000 milliLiter(s) (100 mL/Hr) IV Continuous <Continuous>  folic acid 1 milliGRAM(s) Oral daily  heparin   Injectable 5000 Unit(s) SubCutaneous every 8 hours  pantoprazole  Injectable 40 milliGRAM(s) IV Push daily  thiamine 100 milliGRAM(s) Oral daily    MEDICATIONS  (PRN):  acetaminophen   Tablet .. 650 milliGRAM(s) Oral every 6 hours PRN Temp greater or equal to 38C (100.4F), Mild Pain (1 - 3)    ICU Vital Signs Last 24 Hrs  T(C): 36.9 (30 Apr 2021 05:00), Max: 37.7 (29 Apr 2021 14:00)  T(F): 98.4 (30 Apr 2021 05:00), Max: 99.9 (29 Apr 2021 14:00)  HR: 82 (30 Apr 2021 10:00) (75 - 105)  BP: 78/49 (30 Apr 2021 10:00) (78/49 - 165/90)  BP(mean): 56 (30 Apr 2021 10:00) (56 - 109)  ABP: --  ABP(mean): --  RR: 20 (30 Apr 2021 10:00) (17 - 27)  SpO2: 97% (30 Apr 2021 10:00) (96% - 100%)    I&O's Summary    29 Apr 2021 07:01  -  30 Apr 2021 07:00  --------------------------------------------------------  IN: 3288 mL / OUT: 2054 mL / NET: 1234 mL                       9.8    7.07  )-----------( 206      ( 30 Apr 2021 09:38 )             30.5     04-30    133<L>  |  110<H>  |  5<L>  ----------------------------<  89  3.7   |  16<L>  |  0.97    Ca    9.0      30 Apr 2021 07:04    TPro  7.3  /  Alb  2.5<L>  /  TBili  0.6  /  DBili  x   /  AST  21  /  ALT  21  /  AlkPhos  46  04-30    DVT Prophylaxis:  Heparin subq                                                                Advanced Directives: Full Code                     
Events Overnight: on precedex, still confused, but alert, ammonia slightly [id number]    HPI:        Patient is a 56M with a PMH of chronic ETOH abuse with hx of alcohol withdrawal and DTs with frequent hospital admissions, alcoholic gastritis/esophagitis, PUD, HLD,    requiring intubation due to aspiration PNA who presents to the ED for abdominal pain and vomiting.     Noted to have brown vomitus in ED In ed he had ct which showed hepatosteatosis.  Tachycardic in ED to 127.  Labs show significant lactic acidosis.  Blood alcohol level of 296.    Patient was admitted and  was transferred to ICU and started on phenobarbital and precedex.  Patient transferred to  9/27 for load rebalancing  On arrival awake, slightly confused, but answering question on precedex trip, slightly agitated    Review of Systems:  Review of Systems: unable to obtain       MEDICATIONS  (STANDING):  dexMEDEtomidine Infusion 0.5 MICROgram(s)/kG/Hr (8.25 mL/Hr) IV Continuous <Continuous>  dextrose 5% + sodium chloride 0.9%. 1000 milliLiter(s) (100 mL/Hr) IV Continuous <Continuous>  folic acid 1 milliGRAM(s) Oral daily  heparin   Injectable 5000 Unit(s) SubCutaneous every 8 hours  lactulose Syrup 1 Gram(s) Oral daily  pantoprazole  Injectable 40 milliGRAM(s) IV Push daily  thiamine 100 milliGRAM(s) Oral daily    MEDICATIONS  (PRN):    Weight (kg): 68.9 (04-27 @ 16:00)    ICU Vital Signs Last 24 Hrs  T(C): 36.8 (28 Apr 2021 00:15), Max: 36.8 (27 Apr 2021 16:00)  T(F): 98.2 (28 Apr 2021 00:15), Max: 98.3 (27 Apr 2021 16:00)  HR: 75 (28 Apr 2021 10:00) (50 - 77)  BP: 97/68 (28 Apr 2021 10:00) (97/68 - 155/79)  BP(mean): 72 (28 Apr 2021 10:00) (72 - 100)  ABP: --  ABP(mean): --  RR: 22 (28 Apr 2021 10:00) (11 - 22)  SpO2: 98% (28 Apr 2021 10:00) (96% - 100%)    I&O's Summary    27 Apr 2021 07:01  -  28 Apr 2021 07:00  --------------------------------------------------------  IN: 261 mL / OUT: 0 mL / NET: 261 mL                        9.9    3.22  )-----------( 199      ( 28 Apr 2021 06:01 )             31.9       04-28    140  |  110<H>  |  7   ----------------------------<  123<H>  3.1<L>   |  25  |  0.67    Ca    8.5      28 Apr 2021 06:01  Phos  3.1     04-27  Mg     2.2     04-27    TPro  7.0  /  Alb  2.8<L>  /  TBili  0.4  /  DBili  x   /  AST  19  /  ALT  27  /  AlkPhos  47  04-28    DVT Prophylaxis:  heparin subq                                                               Advanced Directives: Full code                     
Events Overnight:: Patient off precedex this am    HPI:       Patient is a 56M with a PMH of chronic ETOH abuse with hx of alcohol withdrawal and DTs with frequent hospital admissions, alcoholic gastritis/esophagitis, PUD, HLD,    requiring intubation due to aspiration PNA who presents to the ED for abdominal pain and vomiting.   Noted to have brown vomitus in ED In ed he had ct which showed hepatosteatosis.   Labs s  Blood alcohol level of 296.    Patient was admitted and  was transferred to ICU and started on phenobarbital and precedex.  Patient transferred to  9/27 for load rebalancing  On arrival awake, slightly confused, but answering question on precedex trip, slightly agitated   In last 24 hours much more appropriate off precedex this am    Review of Systems:  Review of Systems: no fevers, no chills, no chest pain, no shortness of breathe, no abdominal pain, no extremity pain,  ros otherwise negative    PMH:        multiple admissions for alcoholism       MEDICATIONS  (STANDING):  folic acid 1 milliGRAM(s) Oral daily  heparin   Injectable 5000 Unit(s) SubCutaneous every 8 hours  thiamine 100 milliGRAM(s) Oral daily    MEDICATIONS  (PRN):  acetaminophen   Tablet .. 650 milliGRAM(s) Oral every 6 hours PRN Temp greater or equal to 38C (100.4F), Mild Pain (1 - 3)    ICU Vital Signs Last 24 Hrs  T(C): 37 (01 May 2021 04:00), Max: 37.6 (30 Apr 2021 16:00)  T(F): 98.6 (01 May 2021 04:00), Max: 99.6 (30 Apr 2021 16:00)  HR: 98 (01 May 2021 08:00) (62 - 98)  BP: 110/74 (01 May 2021 08:00) (76/33 - 149/86)  BP(mean): 82 (01 May 2021 08:00) (44 - 99)  ABP: --  ABP(mean): --  RR: 22 (01 May 2021 08:00) (16 - 25)  SpO2: 100% (01 May 2021 08:00) (96% - 100%)    I&O's Summary    30 Apr 2021 07:01  -  01 May 2021 07:00  --------------------------------------------------------  IN: 2864 mL / OUT: 1550 mL / NET: 1314 mL    01 May 2021 07:01  -  01 May 2021 08:28  --------------------------------------------------------  IN: 0 mL / OUT: 300 mL / NET: -300 mL                       9.8    7.07  )-----------( 206      ( 30 Apr 2021 09:38 )             30.5     04-30    133<L>  |  110<H>  |  5<L>  ----------------------------<  89  3.7   |  16<L>  |  0.97    Ca    9.0      30 Apr 2021 07:04    TPro  7.3  /  Alb  2.5<L>  /  TBili  0.6  /  DBili  x   /  AST  21  /  ALT  21  /  AlkPhos  46  04-30    DVT Prophylaxis:  Heparin subq                                                               Advanced Directives: Full Code

## 2021-05-01 NOTE — PROGRESS NOTE ADULT - ASSESSMENT
Patient with history of etoh withdrawal with multiple episodes in past  hx. intubation for aspiratory pneumonia from one episode  admitted with abdominal pain and inc etoh level  subsequently developed dts  and placed on phenobarb, and precedex  at this point receved phenobarbital, will titrate precedex, if becomes  agitated can always receive another dose of phenobarb if need  protonix for gastritis  hydration  diet when awake    
Patient with history of etoh withdrawal with multiple episodes in past  hx. intubation for aspiratory pneumonia from one episode of dts  admitted with abdominal pain and inc etoh level  subsequently developed dts  was  placed on phenobarb, and precedex  precedex off  has not required additional phenobarbital in last 24 hours. improving  protonix for gastritis  diet , as tolerated  May transfer to floor  
Patient with history of etoh withdrawal with multiple episodes in past  hx. intubation for aspiratory pneumonia from one episode of dts  admitted with abdominal pain and inc etoh level  subsequently developed dts  and placed on phenobarb, and precedex  at this point receved phenobarbital, will titrate precedex, given additional dose of phenobarbital  agitated can always receive another dose of phenobarb if need  protonix for gastritis  hydration  diet when awake, as tolerated  hypokalemia will replace  will had lactulose daily, slightly increased ammonia level    
Patient with history of etoh withdrawal with multiple episodes in past  hx. intubation for aspiratory pneumonia from one episode of dts  admitted with abdominal pain and inc etoh level  subsequently developed dts  and placed on phenobarb, and precedex  has not required additional phenobarbital in last 24 hours.  protonix for gastritis  hydration  diet when awake, as tolerated       
Patient with history of etoh withdrawal with multiple episodes in past  hx. intubation for aspiratory pneumonia from one episode  admitted with abdominal pain and inc etoh level  subsequently developed dts  and placed on phenobarb, and precedex  at this point receved phenobarbital, will titrate precedex,   agitated can always receive another dose of phenobarb if need  protonix for gastritis  hydration  diet when awake, as tolerated  hypokalemia will replace  will had lactulose daily, slightly increased ammonia level

## 2021-05-02 VITALS
TEMPERATURE: 98 F | RESPIRATION RATE: 16 BRPM | HEART RATE: 74 BPM | OXYGEN SATURATION: 100 % | SYSTOLIC BLOOD PRESSURE: 136 MMHG | DIASTOLIC BLOOD PRESSURE: 79 MMHG

## 2021-05-02 DIAGNOSIS — E87.2 ACIDOSIS: ICD-10-CM

## 2021-05-02 DIAGNOSIS — F10.231 ALCOHOL DEPENDENCE WITH WITHDRAWAL DELIRIUM: ICD-10-CM

## 2021-05-02 DIAGNOSIS — R13.0 APHAGIA: ICD-10-CM

## 2021-05-02 DIAGNOSIS — R00.1 BRADYCARDIA, UNSPECIFIED: ICD-10-CM

## 2021-05-02 DIAGNOSIS — K29.21 ALCOHOLIC GASTRITIS WITH BLEEDING: ICD-10-CM

## 2021-05-02 DIAGNOSIS — R45.1 RESTLESSNESS AND AGITATION: ICD-10-CM

## 2021-05-02 DIAGNOSIS — K20.80 OTHER ESOPHAGITIS WITHOUT BLEEDING: ICD-10-CM

## 2021-05-02 DIAGNOSIS — Z86.16 PERSONAL HISTORY OF COVID-19: ICD-10-CM
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
## 2021-05-02 DIAGNOSIS — I95.2 HYPOTENSION DUE TO DRUGS: ICD-10-CM

## 2021-05-02 DIAGNOSIS — R68.0 HYPOTHERMIA, NOT ASSOCIATED WITH LOW ENVIRONMENTAL TEMPERATURE: ICD-10-CM

## 2021-05-02 DIAGNOSIS — K57.90 DIVERTICULOSIS OF INTESTINE, PART UNSPECIFIED, WITHOUT PERFORATION OR ABSCESS WITHOUT BLEEDING: ICD-10-CM

## 2021-05-02 DIAGNOSIS — F10.229 ALCOHOL DEPENDENCE WITH INTOXICATION, UNSPECIFIED: ICD-10-CM

## 2021-05-02 DIAGNOSIS — T42.75XA ADVERSE EFFECT OF UNSPECIFIED ANTIEPILEPTIC AND SEDATIVE-HYPNOTIC DRUGS, INITIAL ENCOUNTER: ICD-10-CM

## 2021-05-02 RX ORDER — BENZOCAINE AND MENTHOL 5; 1 G/100ML; G/100ML
1 LIQUID ORAL ONCE
Refills: 0 | Status: COMPLETED | OUTPATIENT
Start: 2021-05-02 | End: 2021-05-02

## 2021-05-02 RX ADMIN — Medication 1 MILLIGRAM(S): at 09:48

## 2021-05-02 RX ADMIN — HEPARIN SODIUM 5000 UNIT(S): 5000 INJECTION INTRAVENOUS; SUBCUTANEOUS at 05:47

## 2021-05-02 RX ADMIN — Medication 100 MILLIGRAM(S): at 09:48

## 2021-05-02 RX ADMIN — BENZOCAINE AND MENTHOL 1 LOZENGE: 5; 1 LIQUID ORAL at 06:03

## 2021-05-02 NOTE — PROVIDER CONTACT NOTE (OTHER) - ASSESSMENT
Pt complaining of R. shoulder pain; rated 6-7.
Pt verbalized understanding of counseling provided on risks assoc w/leaving against medical advice. Pt awaiting son to pick him up, midline, saline lock removed.

## 2021-05-02 NOTE — PROVIDER CONTACT NOTE (OTHER) - BACKGROUND
Pt transferred to 5E from ICU stabilized s/p alcohol withdrawal treatment.
Pt stated his son will pick him up.

## 2021-05-02 NOTE — PROVIDER CONTACT NOTE (OTHER) - SITUATION
Pt requesting to be discharged immediately, advised hospital doctor will be in to examine him to evaluate for dc. Pt refusing to wait stating "yesterday the doctor told me I could leave this morning"
Pt complaining of R. shoulder pain not relieved by tylenol

## 2021-05-02 NOTE — PROVIDER CONTACT NOTE (OTHER) - NAME OF MD/NP/PA/DO NOTIFIED:
Symptoms today seemed to be due to COPD. Patient reports breathing/chest tightness resovled with duo-neb provided by EMS. Will continue duo-nebs and cautious steroids given diabetes. Patient with few wheezing on exam.  
Dr Bryant
PAULA Mock

## 2021-05-06 DIAGNOSIS — E78.2 MIXED HYPERLIPIDEMIA: ICD-10-CM

## 2021-05-06 DIAGNOSIS — K76.0 FATTY (CHANGE OF) LIVER, NOT ELSEWHERE CLASSIFIED: ICD-10-CM

## 2021-05-06 DIAGNOSIS — Z78.1 PHYSICAL RESTRAINT STATUS: ICD-10-CM

## 2021-05-06 DIAGNOSIS — E87.2 ACIDOSIS: ICD-10-CM

## 2021-05-06 DIAGNOSIS — F10.231 ALCOHOL DEPENDENCE WITH WITHDRAWAL DELIRIUM: ICD-10-CM

## 2021-05-06 DIAGNOSIS — F10.229 ALCOHOL DEPENDENCE WITH INTOXICATION, UNSPECIFIED: ICD-10-CM

## 2021-05-06 DIAGNOSIS — R00.0 TACHYCARDIA, UNSPECIFIED: ICD-10-CM

## 2021-05-06 DIAGNOSIS — Z87.11 PERSONAL HISTORY OF PEPTIC ULCER DISEASE: ICD-10-CM

## 2021-05-06 DIAGNOSIS — K20.80 OTHER ESOPHAGITIS WITHOUT BLEEDING: ICD-10-CM

## 2021-05-06 DIAGNOSIS — K29.20 ALCOHOLIC GASTRITIS WITHOUT BLEEDING: ICD-10-CM

## 2021-05-06 DIAGNOSIS — E87.6 HYPOKALEMIA: ICD-10-CM

## 2021-05-06 DIAGNOSIS — E43 UNSPECIFIED SEVERE PROTEIN-CALORIE MALNUTRITION: ICD-10-CM
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
## 2021-06-04 ENCOUNTER — INPATIENT (INPATIENT)
Facility: HOSPITAL | Age: 56
LOS: 5 days | Discharge: HOME | End: 2021-06-10
Attending: STUDENT IN AN ORGANIZED HEALTH CARE EDUCATION/TRAINING PROGRAM | Admitting: STUDENT IN AN ORGANIZED HEALTH CARE EDUCATION/TRAINING PROGRAM
Payer: MEDICAID

## 2021-06-04 VITALS
OXYGEN SATURATION: 98 % | TEMPERATURE: 97 F | HEART RATE: 157 BPM | WEIGHT: 167.99 LBS | SYSTOLIC BLOOD PRESSURE: 114 MMHG | HEIGHT: 67 IN | RESPIRATION RATE: 20 BRPM | DIASTOLIC BLOOD PRESSURE: 84 MMHG

## 2021-06-04 LAB
ALBUMIN SERPL ELPH-MCNC: 3.7 G/DL — SIGNIFICANT CHANGE UP (ref 3.3–5)
ALBUMIN SERPL ELPH-MCNC: 4.5 G/DL — SIGNIFICANT CHANGE UP (ref 3.3–5)
ALP SERPL-CCNC: 54 U/L — SIGNIFICANT CHANGE UP (ref 40–120)
ALP SERPL-CCNC: 59 U/L — SIGNIFICANT CHANGE UP (ref 40–120)
ALT FLD-CCNC: 23 U/L — SIGNIFICANT CHANGE UP (ref 12–78)
ALT FLD-CCNC: 30 U/L — SIGNIFICANT CHANGE UP (ref 12–78)
ANION GAP SERPL CALC-SCNC: 28 MMOL/L — HIGH (ref 5–17)
ANION GAP SERPL CALC-SCNC: 7 MMOL/L — SIGNIFICANT CHANGE UP (ref 5–17)
AST SERPL-CCNC: 31 U/L — SIGNIFICANT CHANGE UP (ref 15–37)
AST SERPL-CCNC: 40 U/L — HIGH (ref 15–37)
BASE EXCESS BLDV CALC-SCNC: -2.2 MMOL/L — LOW (ref -2–2)
BASOPHILS # BLD AUTO: 0.06 K/UL — SIGNIFICANT CHANGE UP (ref 0–0.2)
BASOPHILS NFR BLD AUTO: 0.7 % — SIGNIFICANT CHANGE UP (ref 0–2)
BILIRUB DIRECT SERPL-MCNC: 0.18 MG/DL — SIGNIFICANT CHANGE UP (ref 0.05–0.2)
BILIRUB INDIRECT FLD-MCNC: 0.6 MG/DL — SIGNIFICANT CHANGE UP (ref 0.2–1)
BILIRUB SERPL-MCNC: 0.7 MG/DL — SIGNIFICANT CHANGE UP (ref 0.2–1.2)
BILIRUB SERPL-MCNC: 0.8 MG/DL — SIGNIFICANT CHANGE UP (ref 0.2–1.2)
BLOOD GAS COMMENTS, VENOUS: SIGNIFICANT CHANGE UP
BUN SERPL-MCNC: 18 MG/DL — SIGNIFICANT CHANGE UP (ref 7–23)
BUN SERPL-MCNC: 19 MG/DL — SIGNIFICANT CHANGE UP (ref 7–23)
CALCIUM SERPL-MCNC: 10 MG/DL — SIGNIFICANT CHANGE UP (ref 8.5–10.1)
CALCIUM SERPL-MCNC: 8.7 MG/DL — SIGNIFICANT CHANGE UP (ref 8.5–10.1)
CHLORIDE SERPL-SCNC: 104 MMOL/L — SIGNIFICANT CHANGE UP (ref 96–108)
CHLORIDE SERPL-SCNC: 99 MMOL/L — SIGNIFICANT CHANGE UP (ref 96–108)
CO2 SERPL-SCNC: 14 MMOL/L — LOW (ref 22–31)
CO2 SERPL-SCNC: 30 MMOL/L — SIGNIFICANT CHANGE UP (ref 22–31)
CREAT SERPL-MCNC: 1.15 MG/DL — SIGNIFICANT CHANGE UP (ref 0.5–1.3)
CREAT SERPL-MCNC: 1.55 MG/DL — HIGH (ref 0.5–1.3)
EOSINOPHIL # BLD AUTO: 0 K/UL — SIGNIFICANT CHANGE UP (ref 0–0.5)
EOSINOPHIL NFR BLD AUTO: 0 % — SIGNIFICANT CHANGE UP (ref 0–6)
ETHANOL SERPL-MCNC: 19 MG/DL — HIGH (ref 0–10)
GAS PNL BLDV: SIGNIFICANT CHANGE UP
GLUCOSE SERPL-MCNC: 102 MG/DL — HIGH (ref 70–99)
GLUCOSE SERPL-MCNC: 120 MG/DL — HIGH (ref 70–99)
HCO3 BLDV-SCNC: 20 MMOL/L — LOW (ref 21–29)
HCT VFR BLD CALC: 40.8 % — SIGNIFICANT CHANGE UP (ref 39–50)
HGB BLD-MCNC: 12.8 G/DL — LOW (ref 13–17)
HOROWITZ INDEX BLDV+IHG-RTO: 21 — SIGNIFICANT CHANGE UP
IMM GRANULOCYTES NFR BLD AUTO: 0.2 % — SIGNIFICANT CHANGE UP (ref 0–1.5)
LACTATE SERPL-SCNC: 14.5 MMOL/L — CRITICAL HIGH (ref 0.7–2)
LACTATE SERPL-SCNC: 3 MMOL/L — HIGH (ref 0.7–2)
LIDOCAIN IGE QN: 71 U/L — LOW (ref 73–393)
LYMPHOCYTES # BLD AUTO: 0.9 K/UL — LOW (ref 1–3.3)
LYMPHOCYTES # BLD AUTO: 9.9 % — LOW (ref 13–44)
MAGNESIUM SERPL-MCNC: 1.8 MG/DL — SIGNIFICANT CHANGE UP (ref 1.6–2.6)
MAGNESIUM SERPL-MCNC: 1.8 MG/DL — SIGNIFICANT CHANGE UP (ref 1.6–2.6)
MCHC RBC-ENTMCNC: 24 PG — LOW (ref 27–34)
MCHC RBC-ENTMCNC: 31.4 GM/DL — LOW (ref 32–36)
MCV RBC AUTO: 76.4 FL — LOW (ref 80–100)
MONOCYTES # BLD AUTO: 0.45 K/UL — SIGNIFICANT CHANGE UP (ref 0–0.9)
MONOCYTES NFR BLD AUTO: 5 % — SIGNIFICANT CHANGE UP (ref 2–14)
NEUTROPHILS # BLD AUTO: 7.65 K/UL — HIGH (ref 1.8–7.4)
NEUTROPHILS NFR BLD AUTO: 84.2 % — HIGH (ref 43–77)
NRBC # BLD: 0 /100 WBCS — SIGNIFICANT CHANGE UP (ref 0–0)
PCO2 BLDV: 27 MMHG — LOW (ref 35–50)
PH BLDV: 7.48 — HIGH (ref 7.35–7.45)
PHOSPHATE SERPL-MCNC: 2.4 MG/DL — LOW (ref 2.5–4.5)
PHOSPHATE SERPL-MCNC: 5.3 MG/DL — HIGH (ref 2.5–4.5)
PLATELET # BLD AUTO: 349 K/UL — SIGNIFICANT CHANGE UP (ref 150–400)
PO2 BLDV: <46 MMHG — HIGH (ref 25–45)
POTASSIUM SERPL-MCNC: 3.8 MMOL/L — SIGNIFICANT CHANGE UP (ref 3.5–5.3)
POTASSIUM SERPL-MCNC: 5.1 MMOL/L — SIGNIFICANT CHANGE UP (ref 3.5–5.3)
POTASSIUM SERPL-SCNC: 3.8 MMOL/L — SIGNIFICANT CHANGE UP (ref 3.5–5.3)
POTASSIUM SERPL-SCNC: 5.1 MMOL/L — SIGNIFICANT CHANGE UP (ref 3.5–5.3)
PROT SERPL-MCNC: 7.7 GM/DL — SIGNIFICANT CHANGE UP (ref 6–8.3)
PROT SERPL-MCNC: 9.5 GM/DL — HIGH (ref 6–8.3)
RAPID RVP RESULT: SIGNIFICANT CHANGE UP
RBC # BLD: 5.34 M/UL — SIGNIFICANT CHANGE UP (ref 4.2–5.8)
RBC # FLD: 17.1 % — HIGH (ref 10.3–14.5)
SAO2 % BLDV: 80 % — SIGNIFICANT CHANGE UP (ref 67–88)
SARS-COV-2 RNA SPEC QL NAA+PROBE: SIGNIFICANT CHANGE UP
SODIUM SERPL-SCNC: 141 MMOL/L — SIGNIFICANT CHANGE UP (ref 135–145)
SODIUM SERPL-SCNC: 141 MMOL/L — SIGNIFICANT CHANGE UP (ref 135–145)
WBC # BLD: 9.08 K/UL — SIGNIFICANT CHANGE UP (ref 3.8–10.5)
WBC # FLD AUTO: 9.08 K/UL — SIGNIFICANT CHANGE UP (ref 3.8–10.5)

## 2021-06-04 PROCEDURE — 71045 X-RAY EXAM CHEST 1 VIEW: CPT | Mod: 26

## 2021-06-04 PROCEDURE — 99222 1ST HOSP IP/OBS MODERATE 55: CPT

## 2021-06-04 PROCEDURE — 99285 EMERGENCY DEPT VISIT HI MDM: CPT

## 2021-06-04 PROCEDURE — 74177 CT ABD & PELVIS W/CONTRAST: CPT | Mod: 26,MA

## 2021-06-04 RX ORDER — MORPHINE SULFATE 50 MG/1
4 CAPSULE, EXTENDED RELEASE ORAL ONCE
Refills: 0 | Status: DISCONTINUED | OUTPATIENT
Start: 2021-06-04 | End: 2021-06-04

## 2021-06-04 RX ORDER — MORPHINE SULFATE 50 MG/1
2 CAPSULE, EXTENDED RELEASE ORAL ONCE
Refills: 0 | Status: DISCONTINUED | OUTPATIENT
Start: 2021-06-04 | End: 2021-06-04

## 2021-06-04 RX ORDER — PANTOPRAZOLE SODIUM 20 MG/1
40 TABLET, DELAYED RELEASE ORAL ONCE
Refills: 0 | Status: COMPLETED | OUTPATIENT
Start: 2021-06-04 | End: 2021-06-04

## 2021-06-04 RX ORDER — METOPROLOL TARTRATE 50 MG
2.5 TABLET ORAL EVERY 8 HOURS
Refills: 0 | Status: DISCONTINUED | OUTPATIENT
Start: 2021-06-04 | End: 2021-06-06

## 2021-06-04 RX ORDER — PANTOPRAZOLE SODIUM 20 MG/1
1 TABLET, DELAYED RELEASE ORAL
Qty: 0 | Refills: 0 | DISCHARGE

## 2021-06-04 RX ORDER — SODIUM CHLORIDE 9 MG/ML
1000 INJECTION, SOLUTION INTRAVENOUS
Refills: 0 | Status: DISCONTINUED | OUTPATIENT
Start: 2021-06-04 | End: 2021-06-06

## 2021-06-04 RX ORDER — SODIUM CHLORIDE 9 MG/ML
2000 INJECTION, SOLUTION INTRAVENOUS ONCE
Refills: 0 | Status: COMPLETED | OUTPATIENT
Start: 2021-06-04 | End: 2021-06-04

## 2021-06-04 RX ORDER — PANTOPRAZOLE SODIUM 20 MG/1
40 TABLET, DELAYED RELEASE ORAL DAILY
Refills: 0 | Status: DISCONTINUED | OUTPATIENT
Start: 2021-06-04 | End: 2021-06-05

## 2021-06-04 RX ORDER — HEPARIN SODIUM 5000 [USP'U]/ML
5000 INJECTION INTRAVENOUS; SUBCUTANEOUS EVERY 12 HOURS
Refills: 0 | Status: DISCONTINUED | OUTPATIENT
Start: 2021-06-04 | End: 2021-06-06

## 2021-06-04 RX ORDER — ONDANSETRON 8 MG/1
4 TABLET, FILM COATED ORAL ONCE
Refills: 0 | Status: COMPLETED | OUTPATIENT
Start: 2021-06-04 | End: 2021-06-04

## 2021-06-04 RX ORDER — PANTOPRAZOLE SODIUM 20 MG/1
40 TABLET, DELAYED RELEASE ORAL
Refills: 0 | Status: DISCONTINUED | OUTPATIENT
Start: 2021-06-04 | End: 2021-06-04

## 2021-06-04 RX ADMIN — MORPHINE SULFATE 4 MILLIGRAM(S): 50 CAPSULE, EXTENDED RELEASE ORAL at 08:40

## 2021-06-04 RX ADMIN — SODIUM CHLORIDE 2000 MILLILITER(S): 9 INJECTION, SOLUTION INTRAVENOUS at 07:35

## 2021-06-04 RX ADMIN — MORPHINE SULFATE 2 MILLIGRAM(S): 50 CAPSULE, EXTENDED RELEASE ORAL at 12:29

## 2021-06-04 RX ADMIN — MORPHINE SULFATE 4 MILLIGRAM(S): 50 CAPSULE, EXTENDED RELEASE ORAL at 08:10

## 2021-06-04 RX ADMIN — Medication 4 MILLIGRAM(S): at 15:08

## 2021-06-04 RX ADMIN — Medication 2 MILLIGRAM(S): at 08:25

## 2021-06-04 RX ADMIN — HEPARIN SODIUM 5000 UNIT(S): 5000 INJECTION INTRAVENOUS; SUBCUTANEOUS at 18:45

## 2021-06-04 RX ADMIN — PANTOPRAZOLE SODIUM 40 MILLIGRAM(S): 20 TABLET, DELAYED RELEASE ORAL at 08:11

## 2021-06-04 RX ADMIN — Medication 4 MILLIGRAM(S): at 18:45

## 2021-06-04 RX ADMIN — Medication 4 MILLIGRAM(S): at 21:13

## 2021-06-04 RX ADMIN — ONDANSETRON 4 MILLIGRAM(S): 8 TABLET, FILM COATED ORAL at 07:34

## 2021-06-04 RX ADMIN — SODIUM CHLORIDE 125 MILLILITER(S): 9 INJECTION, SOLUTION INTRAVENOUS at 15:33

## 2021-06-04 RX ADMIN — MORPHINE SULFATE 2 MILLIGRAM(S): 50 CAPSULE, EXTENDED RELEASE ORAL at 11:59

## 2021-06-04 RX ADMIN — Medication 2.5 MILLIGRAM(S): at 21:13

## 2021-06-04 RX ADMIN — SODIUM CHLORIDE 1000 MILLILITER(S): 9 INJECTION, SOLUTION INTRAVENOUS at 08:26

## 2021-06-04 NOTE — H&P ADULT - HISTORY OF PRESENT ILLNESS
: 56 year old male          with h/o HLD, gastritis, alcohol dependence, DT's and PUD         presents today c/o abdominal pain and vomiting which started this morning,           pt has h/o alcohol abuse, last drank vodka two days ago,         pt presents  with   vomiting and tremulous and c/o   abominal pain        (-) chest pain (-) Fevers (-) diarrhea        : 56 year old male          with h/o HLD, gastritis, alcohol dependence, DT's and PUD           presents today c/o abdominal pain and ,   vomiting which started this morning,           pt has h/o alcohol abuse, last drank vodka two days ago,         pt presents  with   vomiting and t abominal pain          has  stoppe d vomiting now/  has mild retching        (-) chest pain (-) Fevers (-) diarrhea       pt has had  mlple   visits, including icu admisssions

## 2021-06-04 NOTE — ED PROVIDER NOTE - OBJECTIVE STATEMENT
56 year old male with h/o HLD, gastritis, alcohol dependence, DT's and PUD presents today c/o abdominal pain and vomiting which started this morning, pt has h/o alcohol abuse, last drank vodka two days ago, pt presents vomiting and tremulous and c/o severe abominal pain (-) chest pain (-) Fevers (-) diarrhea

## 2021-06-04 NOTE — H&P ADULT - NSHPLABSRESULTS_GEN_ALL_CORE
LABS:                        12.8   9.08  )-----------( 349      ( 04 Jun 2021 07:34 )             40.8     06-04    141  |  99  |  19  ----------------------------<  120<H>  5.1   |  14<L>  |  1.55<H>    Ca    10.0      04 Jun 2021 07:34  Phos  5.3     06-04  Mg     1.8     06-04    TPro  9.5<H>  /  Alb  4.5  /  TBili  0.7  /  DBili  x   /  AST  40<H>  /  ALT  30  /  AlkPhos  59  06-04            Lactate, Blood: 3.0 mmol/L (06-04 @ 11:39)  Lactate, Blood: 14.5 mmol/L (06-04 @ 07:34)      06-04 @ 08:44  -2.2  <46

## 2021-06-04 NOTE — H&P ADULT - NSHPREVIEWOFSYSTEMS_GEN_ALL_CORE
REVIEW OF SYSTEMS:  GEN: no fever,    no chills  RESP: no SOB,   no cough  CVS: no chest pain,   no palpitations  GI:   abdominal pain,   no nausea,   no vomiting,   no constipation,   no diarrhea  : no dysuria,   no frequency  NEURO: no headache,   no dizziness  PSYCH: no depression,   not anxious  Derm : no rash REVIEW OF SYSTEMS:  GEN: no fever,    no chills  RESP: no SOB,   no cough  CVS: no chest pain,   no palpitations  GI:  no abdominal pain, now,    no nausea,   no vomiting,   no constipation,   no diarrhea  : no dysuria,   no frequency  NEURO: no headache,   no dizziness  PSYCH: no depression,   not anxious  Derm : no rash

## 2021-06-04 NOTE — H&P ADULT - ASSESSMENT
55 yo M       h/o  chronic ETOH abuse ,  with   h/o / DTs with frequent hospital admissions, HLD, alcoholic gastritis.    h/o poor peripheral access       & hx of hypoxic respiratory failure requiring intubation due to aspiration PNA,. on 3/21        h/o   c/c  tachycardia      h/o mlple icu visits,  needing   Precedex gtt and phenobarb on 3/5./21. 4/25/21  and 4/29/ /21      pt  is  unemployed ,  and,  states he  drinks  vodka  and  whiskey              ---  admitted with   etoh intoxication/  and  impending DT's                Etoh level is  19/   last drink was  2  days ago per  pt       *    Lactic acidosis due to dehydration  and  etoh  toxicity, was  14  on arrival , now  lactate is  3                 pt has ,  AALA.  alcohol associated   lactic acidosis                  pt  states,   he  has pain  all over  his  body,, which can occur  with high lactate                 on    CIWA   protocol                  on iv  fluids.  thiamine,  protonix.               CT  A/P, with esophagitis, hepatic  steatosis       *    elevated AST  of  40             And  Lipase  of  70    *     c/c  tachycardia               from etoh abuse/  withdrawal symptoms                Echo ordered    *     ROSA                Crt is  1.5        *     HTN/  HLD,              pt  is non compliant  with his meds,          on dvt ppx         Given  h/o mlple icu  admissions,  low threshold to call icu, if  pt  deteriorates    per  family, , pt  drinks whiskey on a regular basis,  about half  to a  full  bottle,  for past 25 years  Son[place]avelle, [phone]      ra< from: CT Abdomen and Pelvis w/ IV Cont (06.04.21 @ 13:11) >  IMPRESSION:  No CT evidence of acute pancreatitis.  Hepatic steatosis.  Distal esophageal wall thickening, question esophagiti  < end of copied text >     55 yo M       h/o  chronic ETOH abuse ,  with   h/o / DTs with frequent hospital admissions, HLD, alcoholic gastritis.    h/o poor peripheral access       & hx of hypoxic respiratory failure requiring intubation due to aspiration PNA,. on 3/21        h/o   c/c  tachycardia      h/o mlple icu visits,  needing   Precedex gtt and phenobarb on 3/5./21. 4/25/21  and 4/29/ /21      pt  is  unemployed ,  and,  states he  drinks  vodka  and  whiskey              ---  admitted with   etoh intoxication/  and  impending DT's                Etoh level is  19/   last drink was  2  days ago per  pt       *    Lactic acidosis due to dehydration  and  etoh  toxicity, was  14  on arrival , now  lactate is  3                 pt has ,  AALA.  alcohol associated   lactic acidosis                  pt  states,   he  has pain  all over  his  body,, which can occur  with high lactate                 on    CIWA   protocol                  on iv  fluids.  thiamine,  protonix.               CT  A/P, with esophagitis, hepatic  steatosis       *    elevated AST  of  40             And  Lipase  of  70    *     c/c  Tachycardia               from etoh abuse/  withdrawal symptoms                Echo ordered    *     ROSA                Crt is  1.5        *     HTN/  HLD,              pt  is non compliant  with his meds,             on dvt ppx         Given  h/o mlple icu  admissions,  low threshold to call icu, if  pt  deteriorates    per  family, , pt  drinks whiskey on a regular basis,  about half  to a  full  bottle,  for past 25 years  Son[place]avelle, [phone]      ra< from: CT Abdomen and Pelvis w/ IV Cont (06.04.21 @ 13:11) >  IMPRESSION:  No CT evidence of acute pancreatitis.  Hepatic steatosis.  Distal esophageal wall thickening, question esophagiti  < end of copied text >     57 yo M       h/o  chronic ETOH abuse ,  with   h/o / DTs with frequent hospital admissions, HLD, alcoholic gastritis.    h/o poor peripheral access       & hx of hypoxic respiratory failure requiring intubation due to aspiration PNA,. on 3/21        h/o   c/c  tachycardia      h/o mlple icu visits,  needing   Precedex gtt and phenobarb on 3/5./21. 4/25/21  and 4/29/ /21      pt  is  unemployed ,  and,  states he  drinks  vodka  and  whiskey              ---  admitted with   etoh intoxication/  and  impending DT's                Etoh level is  19/   last drink was  2  days ago per  pt       *    Lactic acidosis due to dehydration  and  etoh  toxicity, was  14  on arrival , now  lactate is  3                 pt has ,  AALA.  alcohol associated   lactic acidosis                  pt  states,   he  has pain  all over  his  body,, which can occur  with high lactate                 on    CIWA   protocol                  on iv  fluids.  thiamine,  protonix.               CT  A/P, with esophagitis, hepatic  steatosis       *    elevated AST  of  40             And  Lipase  of  70    *     c/c  Tachycardia               from etoh abuse/  withdrawal symptoms               on iv Lopressor                Echo ordered    *     ROSA                Crt is  1.5        *     HTN/  HLD,              pt  is non compliant  with his meds,             on dvt ppx         Given  h/o mlple icu  admissions,  low threshold to call icu, if  pt  deteriorates    per  family, , pt  drinks whiskey on a regular basis,  about half  to a  full  bottle,  for past 25 years  Son[place]av[place], [phone]      ra< from: CT Abdomen and Pelvis w/ IV Cont (06.04.21 @ 13:11) >  IMPRESSION:  No CT evidence of acute pancreatitis.  Hepatic steatosis.  Distal esophageal wall thickening, question esophagiti  < end of copied text >

## 2021-06-04 NOTE — ED ADULT NURSE NOTE - OBJECTIVE STATEMENT
Pt c/o of abdomina l pain amd vomiting for the last 3 days.   denies any  diarrhea. Pt is a known alcoholic and reported last drink 2 days ago.

## 2021-06-04 NOTE — ED ADULT TRIAGE NOTE - BSA (M2)
The patient is cleared for discharge per Emergency Department physician. Discharge instructions were reviewed with patient including when and how to follow up with healthcare providers and when to seek emergency care.  Medication use was reviewed and pre s 1.88

## 2021-06-04 NOTE — H&P ADULT - NSHPPHYSICALEXAM_GEN_ALL_CORE
PHYSICAL EXAMINATION:  Vital Signs Last 24 Hrs  T(C): 36.2 (04 Jun 2021 06:57), Max: 36.2 (04 Jun 2021 06:57)  T(F): 97.2 (04 Jun 2021 06:57), Max: 97.2 (04 Jun 2021 06:57)  HR: 157 (04 Jun 2021 06:57) (157 - 157)  BP: 114/84 (04 Jun 2021 06:57) (114/84 - 114/84)  BP(mean): --  RR: 20 (04 Jun 2021 06:57) (20 - 20)  SpO2: 98% (04 Jun 2021 06:57) (98% - 98%)  CAPILLARY BLOOD GLUCOSE            GENERAL: NAD, well-groomed,  HEAD:  atraumatic, normocephalic  EYES: sclera anicteric  ENMT: mucous membranes moist  NECK: supple, No JVD  CHEST/LUNG: clear to auscultation bilaterally;    no      rales   ,   no rhonchi,   HEART: normal S1, S2  ABDOMEN: BS+, soft, ND, NT   EXTREMITIES:    no    edema    b/l LEs  NEURO: awake, ,     moves all extremities  SKIN: no     rash PHYSICAL EXAMINATION:  Vital Signs Last 24 Hrs  T(C): 36.2 (04 Jun 2021 06:57), Max: 36.2 (04 Jun 2021 06:57)  T(F): 97.2 (04 Jun 2021 06:57), Max: 97.2 (04 Jun 2021 06:57)  HR: 157 (04 Jun 2021 06:57) (157 - 157)  BP: 114/84 (04 Jun 2021 06:57) (114/84 - 114/84)  BP(mean): --  RR: 20 (04 Jun 2021 06:57) (20 - 20)  SpO2: 98% (04 Jun 2021 06:57) (98% - 98%)  CAPILLARY BLOOD GLUCOSE            GENERAL: NAD, well-groomed,  HEAD:  atraumatic, normocephalic  EYES: sclera anicteric  ENMT: mucous membranes moist  NECK: supple, No JVD  CHEST/LUNG: clear to auscultation bilaterally;    no      rales   ,   no rhonchi,   HEART: normal S1, S2  ABDOMEN: BS+, soft, ND, NT   EXTREMITIES:    no    edema    b/l LEs  NEURO: awake, ,     moves all extremities  SKIN: no     rash    very alert/ keeps  c/p of  his vomiting at home

## 2021-06-04 NOTE — ED PROVIDER NOTE - CLINICAL SUMMARY MEDICAL DECISION MAKING FREE TEXT BOX
pt presented with diffuse abdominal pain and vomiting, known alcohol history abuse, ivf/pain control,

## 2021-06-05 LAB
ANION GAP SERPL CALC-SCNC: 7 MMOL/L — SIGNIFICANT CHANGE UP (ref 5–17)
APPEARANCE UR: CLEAR — SIGNIFICANT CHANGE UP
BACTERIA # UR AUTO: NEGATIVE — SIGNIFICANT CHANGE UP
BILIRUB UR-MCNC: NEGATIVE — SIGNIFICANT CHANGE UP
BUN SERPL-MCNC: 16 MG/DL — SIGNIFICANT CHANGE UP (ref 7–23)
CALCIUM SERPL-MCNC: 8.9 MG/DL — SIGNIFICANT CHANGE UP (ref 8.5–10.1)
CHLORIDE SERPL-SCNC: 106 MMOL/L — SIGNIFICANT CHANGE UP (ref 96–108)
CO2 SERPL-SCNC: 28 MMOL/L — SIGNIFICANT CHANGE UP (ref 22–31)
COLOR SPEC: YELLOW — SIGNIFICANT CHANGE UP
COVID-19 SPIKE DOMAIN AB INTERP: POSITIVE
COVID-19 SPIKE DOMAIN ANTIBODY RESULT: >250 U/ML — HIGH
CREAT SERPL-MCNC: 1.07 MG/DL — SIGNIFICANT CHANGE UP (ref 0.5–1.3)
DIFF PNL FLD: ABNORMAL
EPI CELLS # UR: SIGNIFICANT CHANGE UP
GLUCOSE SERPL-MCNC: 133 MG/DL — HIGH (ref 70–99)
GLUCOSE UR QL: NEGATIVE MG/DL — SIGNIFICANT CHANGE UP
HCT VFR BLD CALC: 36.5 % — LOW (ref 39–50)
HGB BLD-MCNC: 11.2 G/DL — LOW (ref 13–17)
KETONES UR-MCNC: ABNORMAL
LEUKOCYTE ESTERASE UR-ACNC: ABNORMAL
MAGNESIUM SERPL-MCNC: 2.1 MG/DL — SIGNIFICANT CHANGE UP (ref 1.6–2.6)
MCHC RBC-ENTMCNC: 24.1 PG — LOW (ref 27–34)
MCHC RBC-ENTMCNC: 30.7 GM/DL — LOW (ref 32–36)
MCV RBC AUTO: 78.5 FL — LOW (ref 80–100)
NITRITE UR-MCNC: NEGATIVE — SIGNIFICANT CHANGE UP
NRBC # BLD: 0 /100 WBCS — SIGNIFICANT CHANGE UP (ref 0–0)
PH UR: 8 — SIGNIFICANT CHANGE UP (ref 5–8)
PHOSPHATE SERPL-MCNC: 2.2 MG/DL — LOW (ref 2.5–4.5)
PLATELET # BLD AUTO: 257 K/UL — SIGNIFICANT CHANGE UP (ref 150–400)
POTASSIUM SERPL-MCNC: 3.5 MMOL/L — SIGNIFICANT CHANGE UP (ref 3.5–5.3)
POTASSIUM SERPL-SCNC: 3.5 MMOL/L — SIGNIFICANT CHANGE UP (ref 3.5–5.3)
PROT UR-MCNC: 30 MG/DL
RBC # BLD: 4.65 M/UL — SIGNIFICANT CHANGE UP (ref 4.2–5.8)
RBC # FLD: 16.6 % — HIGH (ref 10.3–14.5)
RBC CASTS # UR COMP ASSIST: SIGNIFICANT CHANGE UP /HPF (ref 0–4)
SARS-COV-2 IGG+IGM SERPL QL IA: >250 U/ML — HIGH
SARS-COV-2 IGG+IGM SERPL QL IA: POSITIVE
SODIUM SERPL-SCNC: 141 MMOL/L — SIGNIFICANT CHANGE UP (ref 135–145)
SP GR SPEC: 1.01 — SIGNIFICANT CHANGE UP (ref 1.01–1.02)
UROBILINOGEN FLD QL: NEGATIVE MG/DL — SIGNIFICANT CHANGE UP
WBC # BLD: 5.83 K/UL — SIGNIFICANT CHANGE UP (ref 3.8–10.5)
WBC # FLD AUTO: 5.83 K/UL — SIGNIFICANT CHANGE UP (ref 3.8–10.5)
WBC UR QL: SIGNIFICANT CHANGE UP

## 2021-06-05 PROCEDURE — 93306 TTE W/DOPPLER COMPLETE: CPT | Mod: 26

## 2021-06-05 PROCEDURE — 93010 ELECTROCARDIOGRAM REPORT: CPT

## 2021-06-05 PROCEDURE — 99232 SBSQ HOSP IP/OBS MODERATE 35: CPT

## 2021-06-05 RX ORDER — METOPROLOL TARTRATE 50 MG
25 TABLET ORAL ONCE
Refills: 0 | Status: COMPLETED | OUTPATIENT
Start: 2021-06-05 | End: 2021-06-05

## 2021-06-05 RX ORDER — PHENOBARBITAL 60 MG
130 TABLET ORAL ONCE
Refills: 0 | Status: DISCONTINUED | OUTPATIENT
Start: 2021-06-05 | End: 2021-06-05

## 2021-06-05 RX ORDER — PANTOPRAZOLE SODIUM 20 MG/1
40 TABLET, DELAYED RELEASE ORAL
Refills: 0 | Status: DISCONTINUED | OUTPATIENT
Start: 2021-06-05 | End: 2021-06-10

## 2021-06-05 RX ADMIN — Medication 2.5 MILLIGRAM(S): at 21:05

## 2021-06-05 RX ADMIN — Medication 3 MILLIGRAM(S): at 21:05

## 2021-06-05 RX ADMIN — Medication 2 MILLIGRAM(S): at 19:06

## 2021-06-05 RX ADMIN — Medication 3 MILLIGRAM(S): at 14:57

## 2021-06-05 RX ADMIN — Medication 4 MILLIGRAM(S): at 06:51

## 2021-06-05 RX ADMIN — Medication 130 MILLIGRAM(S): at 14:18

## 2021-06-05 RX ADMIN — Medication 25 MILLIGRAM(S): at 06:52

## 2021-06-05 RX ADMIN — Medication 2.5 MILLIGRAM(S): at 14:57

## 2021-06-05 RX ADMIN — Medication 4 MILLIGRAM(S): at 11:57

## 2021-06-05 RX ADMIN — HEPARIN SODIUM 5000 UNIT(S): 5000 INJECTION INTRAVENOUS; SUBCUTANEOUS at 06:52

## 2021-06-05 RX ADMIN — Medication 4 MILLIGRAM(S): at 03:02

## 2021-06-05 RX ADMIN — HEPARIN SODIUM 5000 UNIT(S): 5000 INJECTION INTRAVENOUS; SUBCUTANEOUS at 17:12

## 2021-06-05 RX ADMIN — SODIUM CHLORIDE 125 MILLILITER(S): 9 INJECTION, SOLUTION INTRAVENOUS at 17:11

## 2021-06-05 RX ADMIN — Medication 2 MILLIGRAM(S): at 23:04

## 2021-06-05 RX ADMIN — PANTOPRAZOLE SODIUM 40 MILLIGRAM(S): 20 TABLET, DELAYED RELEASE ORAL at 11:54

## 2021-06-05 RX ADMIN — Medication 3 MILLIGRAM(S): at 17:11

## 2021-06-05 NOTE — PROGRESS NOTE ADULT - SUBJECTIVE AND OBJECTIVE BOX
CHIEF COMPLAINT/INTERVAL HISTORY:    Patient is a 56y old  Male who presents with a chief complaint of etoh abuse/  withdrawal (04 Jun 2021 14:03)      HPI:     : 56 year old male          with h/o HLD, gastritis, alcohol dependence, DT's and PUD           presents today c/o abdominal pain and ,   vomiting which started this morning,           pt has h/o alcohol abuse, last drank vodka two days ago,         pt presents  with   vomiting and t abominal pain          has  stoppe d vomiting now/  has mild retching        (-) chest pain (-) Fevers (-) diarrhea       pt has had  mlple   visits, including icu admisssions     (04 Jun 2021 14:03)    Overnight issues  Patient feeling some what better  Denies chest pain SOB  Last CIWA 3          SUBJECTIVE & OBJECTIVE: Pt seen and examined at bedside.   ROS:  CONSTITUTIONAL: No fever, weight loss, or fatigue  EYES: No eye pain, visual disturbances, or discharge  ENMT:  No difficulty hearing, tinnitus, vertigo; No sinus or throat pain  NECK: No pain or stiffness  RESPIRATORY: No cough, wheezing, chills or hemoptysis; No shortness of breath  CARDIOVASCULAR: No chest pain, palpitations,  GASTROINTESTINAL: No abdominal or epigastric pain. No nausea, vomiting,   GENITOURINARY: No dysuria, frequency, hematuria, or incontinence  NEUROLOGICAL: No headaches, memory loss, loss of strength, numbness,  SKIN: No itching, burning, rashes, or lesions   LYMPH NODES: No enlarged glands  ENDOCRINE: No heat or cold intolerance; No hair loss    ICU Vital Signs Last 24 Hrs  T(C): 36.6 (05 Jun 2021 10:03), Max: 37 (05 Jun 2021 05:32)  T(F): 97.9 (05 Jun 2021 10:03), Max: 98.6 (05 Jun 2021 05:32)  HR: 84 (05 Jun 2021 10:03) (79 - 99)  BP: 110/70 (05 Jun 2021 10:03) (110/70 - 144/83)  BP(mean): --  ABP: --  ABP(mean): --  RR: 18 (05 Jun 2021 10:03) (18 - 19)  SpO2: 96% (05 Jun 2021 10:03) (96% - 97%)        MEDICATIONS  (STANDING):  heparin   Injectable 5000 Unit(s) SubCutaneous every 12 hours  lactated ringers. 1000 milliLiter(s) (125 mL/Hr) IV Continuous <Continuous>  LORazepam   Injectable   IV Push   LORazepam   Injectable 3 milliGRAM(s) IV Push every 4 hours  metoprolol tartrate Injectable 2.5 milliGRAM(s) IV Push every 8 hours  pantoprazole    Tablet 40 milliGRAM(s) Oral before breakfast    MEDICATIONS  (PRN):  LORazepam   Injectable 2 milliGRAM(s) IV Push every 3 hours PRN CIWA-Ar score increase by 2 points and a total score of 7 or less        PHYSICAL EXAM:    GENERAL: NAD, well-groomed, well-developed  HEAD:  Atraumatic, Normocephalic  EYES: EOMI, PERRLA, conjunctiva and sclera clear  ENMT: Moist mucous membranes  NECK: Supple, No JVD  NERVOUS SYSTEM:  Awake and moving all 4 limbs   CHEST/LUNG: Clear to auscultation bilaterally; No rales, rhonchi, wheezing, or rubs  HEART: Regular rate and rhythm; No murmurs, rubs, or gallops  ABDOMEN: Soft, Nontender, Nondistended; Bowel sounds present  EXTREMITIES:  2+ Peripheral Pulses, No clubbing, cyanosis, or edema    LABS:                        11.2   5.83  )-----------( 257      ( 05 Jun 2021 10:37 )             36.5     06-05    141  |  106  |  16  ----------------------------<  133<H>  3.5   |  28  |  1.07    Ca    8.9      05 Jun 2021 10:37  Phos  2.2     06-05  Mg     2.1     06-05    TPro  7.7  /  Alb  3.7  /  TBili  0.8  /  DBili  .18  /  AST  31  /  ALT  23  /  AlkPhos  54  06-04          CAPILLARY BLOOD GLUCOSE          RECENT CULTURES:      RADIOLOGY & ADDITIONAL TESTS:  Imaging Personally Reviewed:  [ ] YES      Consultant(s) Notes Reviewed:  [ ] YES     Care Discussed with [ ] Consultants [X ] Patient [ ] Family  [ ]    [x ]  Other; RN  HEALTH ISSUES - PROBLEM Dx:        DVT/GI ppx  Discussed with pt @ bedside

## 2021-06-05 NOTE — PROGRESS NOTE ADULT - ASSESSMENT
55 yo M       h/o  chronic ETOH abuse ,  with   h/o / DTs with frequent hospital admissions, HLD, alcoholic gastritis.    h/o poor peripheral access       & hx of hypoxic respiratory failure requiring intubation due to aspiration PNA,. on 3/21        h/o   c/c  tachycardia      h/o mlple icu visits,  needing   Precedex gtt and phenobarb on 3/5./21. 4/25/21  and 4/29/ /21      pt  is  unemployed ,  and,  states he  drinks  vodka  and  whiskey              ---  admitted with   etoh intoxication/  and  impending DT's                Etoh level is  19/   last drink was  2  days ago per  pt       *    Lactic acidosis due to dehydration  and  etoh  toxicity, was  14  on arrival , last  lactate is  3                 pt has ,  AALA.  alcohol associated   lactic acidosis                  pt  states,   he  has pain  all over  his  body,, which can occur  with high lactate                 on    CIWA   protocol                  on iv  fluids.  thiamine,  protonix.               CT  A/P- no pancreatitis ,       *    elevated AST  of  40             And  Lipase  of  70    *     c/c  Tachycardia               from etoh abuse/  withdrawal symptoms               on iv Lopressor                Echo follow up     *     ROSA                Crt   1.5-- improved to 1.07 now   ROSA resolved         *     HTN/  HLD,              pt  is non compliant  with his meds,             on dvt ppx         Given  h/o mlple icu  admissions,  low threshold to call icu, if  pt  deteriorates    per  family, , pt  drinks whiskey on a regular basis,  about half  to a  full  bottle,  for past 25 years  [place], [phone]       < end of copied text >

## 2021-06-06 LAB
ALBUMIN SERPL ELPH-MCNC: 3.4 G/DL — SIGNIFICANT CHANGE UP (ref 3.3–5)
ALP SERPL-CCNC: 49 U/L — SIGNIFICANT CHANGE UP (ref 40–120)
ALT FLD-CCNC: 27 U/L — SIGNIFICANT CHANGE UP (ref 12–78)
ANION GAP SERPL CALC-SCNC: 5 MMOL/L — SIGNIFICANT CHANGE UP (ref 5–17)
AST SERPL-CCNC: 44 U/L — HIGH (ref 15–37)
BASOPHILS # BLD AUTO: 0.04 K/UL — SIGNIFICANT CHANGE UP (ref 0–0.2)
BASOPHILS NFR BLD AUTO: 1.3 % — SIGNIFICANT CHANGE UP (ref 0–2)
BILIRUB DIRECT SERPL-MCNC: 0.12 MG/DL — SIGNIFICANT CHANGE UP (ref 0.05–0.2)
BILIRUB SERPL-MCNC: 0.6 MG/DL — SIGNIFICANT CHANGE UP (ref 0.2–1.2)
BUN SERPL-MCNC: 10 MG/DL — SIGNIFICANT CHANGE UP (ref 7–23)
CALCIUM SERPL-MCNC: 9 MG/DL — SIGNIFICANT CHANGE UP (ref 8.5–10.1)
CHLORIDE SERPL-SCNC: 109 MMOL/L — HIGH (ref 96–108)
CO2 SERPL-SCNC: 27 MMOL/L — SIGNIFICANT CHANGE UP (ref 22–31)
CREAT SERPL-MCNC: 0.81 MG/DL — SIGNIFICANT CHANGE UP (ref 0.5–1.3)
EOSINOPHIL # BLD AUTO: 0.28 K/UL — SIGNIFICANT CHANGE UP (ref 0–0.5)
EOSINOPHIL NFR BLD AUTO: 8.8 % — HIGH (ref 0–6)
GLUCOSE SERPL-MCNC: 105 MG/DL — HIGH (ref 70–99)
HCT VFR BLD CALC: 36.6 % — LOW (ref 39–50)
HGB BLD-MCNC: 11.2 G/DL — LOW (ref 13–17)
IMM GRANULOCYTES NFR BLD AUTO: 0.3 % — SIGNIFICANT CHANGE UP (ref 0–1.5)
LYMPHOCYTES # BLD AUTO: 1.51 K/UL — SIGNIFICANT CHANGE UP (ref 1–3.3)
LYMPHOCYTES # BLD AUTO: 47.6 % — HIGH (ref 13–44)
MAGNESIUM SERPL-MCNC: 2.1 MG/DL — SIGNIFICANT CHANGE UP (ref 1.6–2.6)
MCHC RBC-ENTMCNC: 24 PG — LOW (ref 27–34)
MCHC RBC-ENTMCNC: 30.6 GM/DL — LOW (ref 32–36)
MCV RBC AUTO: 78.4 FL — LOW (ref 80–100)
MONOCYTES # BLD AUTO: 0.3 K/UL — SIGNIFICANT CHANGE UP (ref 0–0.9)
MONOCYTES NFR BLD AUTO: 9.5 % — SIGNIFICANT CHANGE UP (ref 2–14)
NEUTROPHILS # BLD AUTO: 1.03 K/UL — LOW (ref 1.8–7.4)
NEUTROPHILS NFR BLD AUTO: 32.5 % — LOW (ref 43–77)
NRBC # BLD: 0 /100 WBCS — SIGNIFICANT CHANGE UP (ref 0–0)
PHOSPHATE SERPL-MCNC: 4 MG/DL — SIGNIFICANT CHANGE UP (ref 2.5–4.5)
PLATELET # BLD AUTO: 239 K/UL — SIGNIFICANT CHANGE UP (ref 150–400)
POTASSIUM SERPL-MCNC: 3.8 MMOL/L — SIGNIFICANT CHANGE UP (ref 3.5–5.3)
POTASSIUM SERPL-SCNC: 3.8 MMOL/L — SIGNIFICANT CHANGE UP (ref 3.5–5.3)
PROT SERPL-MCNC: 7.5 GM/DL — SIGNIFICANT CHANGE UP (ref 6–8.3)
RBC # BLD: 4.67 M/UL — SIGNIFICANT CHANGE UP (ref 4.2–5.8)
RBC # FLD: 16.3 % — HIGH (ref 10.3–14.5)
SODIUM SERPL-SCNC: 141 MMOL/L — SIGNIFICANT CHANGE UP (ref 135–145)
WBC # BLD: 3.17 K/UL — LOW (ref 3.8–10.5)
WBC # FLD AUTO: 3.17 K/UL — LOW (ref 3.8–10.5)

## 2021-06-06 PROCEDURE — 99233 SBSQ HOSP IP/OBS HIGH 50: CPT

## 2021-06-06 RX ORDER — METOPROLOL TARTRATE 50 MG
2.5 TABLET ORAL EVERY 8 HOURS
Refills: 0 | Status: DISCONTINUED | OUTPATIENT
Start: 2021-06-06 | End: 2021-06-09

## 2021-06-06 RX ORDER — THIAMINE MONONITRATE (VIT B1) 100 MG
100 TABLET ORAL DAILY
Refills: 0 | Status: DISCONTINUED | OUTPATIENT
Start: 2021-06-06 | End: 2021-06-08

## 2021-06-06 RX ORDER — ENOXAPARIN SODIUM 100 MG/ML
40 INJECTION SUBCUTANEOUS EVERY 24 HOURS
Refills: 0 | Status: DISCONTINUED | OUTPATIENT
Start: 2021-06-06 | End: 2021-06-10

## 2021-06-06 RX ORDER — POTASSIUM CHLORIDE 20 MEQ
10 PACKET (EA) ORAL
Refills: 0 | Status: DISCONTINUED | OUTPATIENT
Start: 2021-06-06 | End: 2021-06-06

## 2021-06-06 RX ORDER — FOLIC ACID 0.8 MG
1 TABLET ORAL DAILY
Refills: 0 | Status: DISCONTINUED | OUTPATIENT
Start: 2021-06-06 | End: 2021-06-08

## 2021-06-06 RX ORDER — DEXMEDETOMIDINE HYDROCHLORIDE IN 0.9% SODIUM CHLORIDE 4 UG/ML
0.2 INJECTION INTRAVENOUS
Qty: 200 | Refills: 0 | Status: DISCONTINUED | OUTPATIENT
Start: 2021-06-06 | End: 2021-06-08

## 2021-06-06 RX ORDER — PHENOBARBITAL 60 MG
130 TABLET ORAL ONCE
Refills: 0 | Status: DISCONTINUED | OUTPATIENT
Start: 2021-06-06 | End: 2021-06-06

## 2021-06-06 RX ORDER — CHLORHEXIDINE GLUCONATE 213 G/1000ML
1 SOLUTION TOPICAL
Refills: 0 | Status: DISCONTINUED | OUTPATIENT
Start: 2021-06-06 | End: 2021-06-08

## 2021-06-06 RX ORDER — PHENOBARBITAL 60 MG
130 TABLET ORAL
Refills: 0 | Status: DISCONTINUED | OUTPATIENT
Start: 2021-06-06 | End: 2021-06-07

## 2021-06-06 RX ORDER — DEXTROSE MONOHYDRATE, SODIUM CHLORIDE, AND POTASSIUM CHLORIDE 50; .745; 4.5 G/1000ML; G/1000ML; G/1000ML
1000 INJECTION, SOLUTION INTRAVENOUS
Refills: 0 | Status: DISCONTINUED | OUTPATIENT
Start: 2021-06-06 | End: 2021-06-10

## 2021-06-06 RX ORDER — PHENOBARBITAL 60 MG
260 TABLET ORAL ONCE
Refills: 0 | Status: DISCONTINUED | OUTPATIENT
Start: 2021-06-06 | End: 2021-06-06

## 2021-06-06 RX ORDER — PHENOBARBITAL 60 MG
130 TABLET ORAL EVERY 6 HOURS
Refills: 0 | Status: DISCONTINUED | OUTPATIENT
Start: 2021-06-06 | End: 2021-06-07

## 2021-06-06 RX ADMIN — Medication 100 MILLIGRAM(S): at 13:23

## 2021-06-06 RX ADMIN — Medication 130 MILLIGRAM(S): at 03:05

## 2021-06-06 RX ADMIN — Medication 130 MILLIGRAM(S): at 02:28

## 2021-06-06 RX ADMIN — DEXMEDETOMIDINE HYDROCHLORIDE IN 0.9% SODIUM CHLORIDE 3.58 MICROGRAM(S)/KG/HR: 4 INJECTION INTRAVENOUS at 05:50

## 2021-06-06 RX ADMIN — DEXMEDETOMIDINE HYDROCHLORIDE IN 0.9% SODIUM CHLORIDE 3.58 MICROGRAM(S)/KG/HR: 4 INJECTION INTRAVENOUS at 03:34

## 2021-06-06 RX ADMIN — DEXMEDETOMIDINE HYDROCHLORIDE IN 0.9% SODIUM CHLORIDE 3.58 MICROGRAM(S)/KG/HR: 4 INJECTION INTRAVENOUS at 13:23

## 2021-06-06 RX ADMIN — HEPARIN SODIUM 5000 UNIT(S): 5000 INJECTION INTRAVENOUS; SUBCUTANEOUS at 05:55

## 2021-06-06 RX ADMIN — DEXMEDETOMIDINE HYDROCHLORIDE IN 0.9% SODIUM CHLORIDE 3.58 MICROGRAM(S)/KG/HR: 4 INJECTION INTRAVENOUS at 21:22

## 2021-06-06 RX ADMIN — Medication 130 MILLIGRAM(S): at 17:22

## 2021-06-06 RX ADMIN — Medication 2.5 MILLIGRAM(S): at 05:55

## 2021-06-06 RX ADMIN — Medication 130 MILLIGRAM(S): at 05:55

## 2021-06-06 RX ADMIN — Medication 130 MILLIGRAM(S): at 13:22

## 2021-06-06 RX ADMIN — SODIUM CHLORIDE 125 MILLILITER(S): 9 INJECTION, SOLUTION INTRAVENOUS at 01:38

## 2021-06-06 RX ADMIN — Medication 1 MILLIGRAM(S): at 13:23

## 2021-06-06 RX ADMIN — ENOXAPARIN SODIUM 40 MILLIGRAM(S): 100 INJECTION SUBCUTANEOUS at 13:23

## 2021-06-06 RX ADMIN — Medication 3 MILLIGRAM(S): at 01:38

## 2021-06-06 RX ADMIN — CHLORHEXIDINE GLUCONATE 1 APPLICATION(S): 213 SOLUTION TOPICAL at 05:51

## 2021-06-06 RX ADMIN — Medication 2.5 MILLIGRAM(S): at 21:25

## 2021-06-06 RX ADMIN — DEXMEDETOMIDINE HYDROCHLORIDE IN 0.9% SODIUM CHLORIDE 3.58 MICROGRAM(S)/KG/HR: 4 INJECTION INTRAVENOUS at 22:59

## 2021-06-06 RX ADMIN — DEXTROSE MONOHYDRATE, SODIUM CHLORIDE, AND POTASSIUM CHLORIDE 110 MILLILITER(S): 50; .745; 4.5 INJECTION, SOLUTION INTRAVENOUS at 21:19

## 2021-06-06 RX ADMIN — Medication 260 MILLIGRAM(S): at 03:00

## 2021-06-06 NOTE — CHART NOTE - NSCHARTNOTEFT_GEN_A_CORE
RRT / CODE Perez    Patient is a 56y old  Male who presents with a chief complaint of etoh abuse/  withdrawal (2021 14:31)      HPI:     : 56 year old male with h/o HLD, gastritis, alcohol dependence, DT's and PUD presents today c/o abdominal pain and ,   vomiting which started this morning,           pt has h/o alcohol abuse, last drank vodka two days ago,         pt presents  with   vomiting and  abdominal pain          has  stopped vomiting now/  has mild retching        (-) chest pain (-) Fevers (-) diarrhea   Pt has had  multiple visits, including icu admisssions     (2021 14:03)      Allergies/ Intolerances   No Known Allergies    MEDICATIONS  (STANDING):  chlorhexidine 2% Cloths 1 Application(s) Topical <User Schedule>  dexMEDEtomidine Infusion 0.2 MICROgram(s)/kG/Hr (3.58 mL/Hr) IV Continuous <Continuous>  heparin   Injectable 5000 Unit(s) SubCutaneous every 12 hours  lactated ringers. 1000 milliLiter(s) (125 mL/Hr) IV Continuous <Continuous>  metoprolol tartrate Injectable 2.5 milliGRAM(s) IV Push every 8 hours  pantoprazole    Tablet 40 milliGRAM(s) Oral before breakfast  PHENobarbital Injectable 130 milliGRAM(s) IV Push every 6 hours    MEDICATIONS  (PRN):  PHENobarbital Injectable 130 milliGRAM(s) IV Push every 2 hours PRN agitation, ciwa>8      Daily     Drug Dosing Weight  Height (cm): 170.2 (2021 06:57)  Weight (kg): 71.6 (2021 15:37)  BMI (kg/m2): 24.7 (2021 15:37)  BSA (m2): 1.83 (2021 15:37)    PAST MEDICAL & SURGICAL HISTORY:  PUD (peptic ulcer disease)    Mixed hyperlipidemia  EtOH dependence  Gastritis  Substance abuse  DTs (delirium tremens)  No significant past surgical history    FAMILY HISTORY:    SOCIAL HISTORY:    ADVANCE DIRECTIVES:    REVIEW OF SYSTEMS:    Review of systems within normal limits except for above       PHYSICAL EXAM:    GENERAL: NAD, well-groomed, well-developed  HEAD:  Atraumatic, Normocephalic  EYES: EOMI, PERRLA, conjunctiva and sclera clear  ENMT: No tonsillar erythema, exudates, or enlargement; Moist mucous membranes, Good dentition, No lesions  NECK: Supple, No JVD, Normal thyroid  NERVOUS SYSTEM:  Alert & Oriented X3, Good concentration; Motor Strength 5/5 B/L upper and lower extremities; DTRs 2+ intact and symmetric  CHEST/LUNG: Clear to percussion bilaterally; No rales, rhonchi, wheezing, or rubs  HEART: Regular rate and rhythm; No murmurs, rubs, or gallops  ABDOMEN: Soft, Nontender, Nondistended; Bowel sounds present  EXTREMITIES:  2+ Peripheral Pulses, No clubbing, cyanosis, or edema  LYMPH: No lymphadenopathy noted  SKIN: No rashes or lesions      LABS:  CBC Full  -  ( 2021 10:37 )  WBC Count : 5.83 K/uL  RBC Count : 4.65 M/uL  Hemoglobin : 11.2 g/dL  Hematocrit : 36.5 %  Platelet Count - Automated : 257 K/uL  Mean Cell Volume : 78.5 fl  Mean Cell Hemoglobin : 24.1 pg  Mean Cell Hemoglobin Concentration : 30.7 gm/dL      06-    141  |  106  |  16  ----------------------------<  133<H>  3.5   |  28  |  1.07    Ca    8.9      2021 10:37  Phos  2.2     06-05  Mg     2.1     06-05    TPro  7.7  /  Alb  3.7  /  TBili  0.8  /  DBili  .18  /  AST  31  /  ALT  23  /  AlkPhos  54  06-04    CAPILLARY BLOOD GLUCOSE      Urinalysis Basic - ( 2021 14:59 )    Color: Yellow / Appearance: Clear / S.010 / pH: x  Gluc: x / Ketone: Small  / Bili: Negative / Urobili: Negative mg/dL   Blood: x / Protein: 30 mg/dL / Nitrite: Negative   Leuk Esterase: Trace / RBC: 0-2 /HPF / WBC 0-2   Sq Epi: x / Non Sq Epi: Occasional / Bacteria: Negative      ICU Vital Signs Last 24 Hrs  T(C): 36.7 (2021 00:13), Max: 37 (2021 05:32)  T(F): 98 (2021 00:13), Max: 98.6 (2021 05:32)  HR: 84 (2021 04:00) (71 - 86)  BP: 125/81 (2021 04:00) (110/70 - 151/90)  BP(mean): 88 (2021 04:00) (88 - 105)  ABP: --  ABP(mean): --  RR: 22 (2021 04:00) (18 - 25)  SpO2: 98% (2021 04:00) (93% - 99%)      ASSESSMENT :   ==============    Patient is a 56y old  Male who presents with a chief complaint of etoh abuse/  withdrawal (2021 14:31)        PLAN:  ========    NEURO:  -neuro checks per routine   - continue dexMEDEtomidine Infusion 0.2 MICROgram(s)/kG/Hr   - continue PHENobarbital Injectable 130 milliGRAM(s)    PULM:  -maintain O2 greater than 92    CV:  -continue metoprolol tartrate Injectable 2.5 milliGRAM(s)  -Maintain MAP > 65    GI:  -continue pantoprazole    Tablet 40 milliGRAM(s)  lactated ringers. 1000 milliLiter(s) IV Continuous      RENAL:  -maintain urine output > 0.5cc/kg/hr     :  CHAN yes [  ] NO [ X ] insertion date     ID:  -monitor for S/S of infection       ENDO:  -maintain blood glucose between 140-180    HEME:  heparin   Injectable 5000 Unit(s)    IMPROVE VTE Individual Risk Assessment  IMPROVE VTE Score ___0______ RRT / CODE Perez    Patient is a 56y old  Male who presents with a chief complaint of etoh abuse/  withdrawal (2021 14:31)      HPI:     : 56 year old male with h/o HLD, gastritis, alcohol dependence, DT's and PUD presents today c/o abdominal pain and ,   vomiting which started this morning,           pt has h/o alcohol abuse, last drank vodka two days ago,         pt presents  with   vomiting and  abdominal pain          has  stopped vomiting now/  has mild retching        (-) chest pain (-) Fevers (-) diarrhea   Pt has had  multiple visits, including icu admisssions     (2021 14:03)      Allergies/ Intolerances   No Known Allergies    MEDICATIONS  (STANDING):  chlorhexidine 2% Cloths 1 Application(s) Topical <User Schedule>  dexMEDEtomidine Infusion 0.2 MICROgram(s)/kG/Hr (3.58 mL/Hr) IV Continuous <Continuous>  heparin   Injectable 5000 Unit(s) SubCutaneous every 12 hours  lactated ringers. 1000 milliLiter(s) (125 mL/Hr) IV Continuous <Continuous>  metoprolol tartrate Injectable 2.5 milliGRAM(s) IV Push every 8 hours  pantoprazole    Tablet 40 milliGRAM(s) Oral before breakfast  PHENobarbital Injectable 130 milliGRAM(s) IV Push every 6 hours    MEDICATIONS  (PRN):  PHENobarbital Injectable 130 milliGRAM(s) IV Push every 2 hours PRN agitation, ciwa>8      Daily     Drug Dosing Weight  Height (cm): 170.2 (2021 06:57)  Weight (kg): 71.6 (2021 15:37)  BMI (kg/m2): 24.7 (2021 15:37)  BSA (m2): 1.83 (2021 15:37)    PAST MEDICAL & SURGICAL HISTORY:  PUD (peptic ulcer disease)    Mixed hyperlipidemia  EtOH dependence  Gastritis  Substance abuse  DTs (delirium tremens)  No significant past surgical history    FAMILY HISTORY:    SOCIAL HISTORY:    ADVANCE DIRECTIVES:    REVIEW OF SYSTEMS:    Review of systems within normal limits except for above       PHYSICAL EXAM:    GENERAL: NAD, well-groomed, well-developed  HEAD:  Atraumatic, Normocephalic  EYES: EOMI, PERRLA, conjunctiva and sclera clear  ENMT: No tonsillar erythema, exudates, or enlargement; Moist mucous membranes, Good dentition, No lesions  NECK: Supple, No JVD, Normal thyroid  NERVOUS SYSTEM:  Alert & Oriented X3, Good concentration; Motor Strength 5/5 B/L upper and lower extremities; DTRs 2+ intact and symmetric  CHEST/LUNG: Clear to percussion bilaterally; No rales, rhonchi, wheezing, or rubs  HEART: Regular rate and rhythm; No murmurs, rubs, or gallops  ABDOMEN: Soft, Nontender, Nondistended; Bowel sounds present  EXTREMITIES:  2+ Peripheral Pulses, No clubbing, cyanosis, or edema  LYMPH: No lymphadenopathy noted  SKIN: No rashes or lesions      LABS:  CBC Full  -  ( 2021 10:37 )  WBC Count : 5.83 K/uL  RBC Count : 4.65 M/uL  Hemoglobin : 11.2 g/dL  Hematocrit : 36.5 %  Platelet Count - Automated : 257 K/uL  Mean Cell Volume : 78.5 fl  Mean Cell Hemoglobin : 24.1 pg  Mean Cell Hemoglobin Concentration : 30.7 gm/dL      06-    141  |  106  |  16  ----------------------------<  133<H>  3.5   |  28  |  1.07    Ca    8.9      2021 10:37  Phos  2.2     06-05  Mg     2.1     06-05    TPro  7.7  /  Alb  3.7  /  TBili  0.8  /  DBili  .18  /  AST  31  /  ALT  23  /  AlkPhos  54  06-04    CAPILLARY BLOOD GLUCOSE      Urinalysis Basic - ( 2021 14:59 )    Color: Yellow / Appearance: Clear / S.010 / pH: x  Gluc: x / Ketone: Small  / Bili: Negative / Urobili: Negative mg/dL   Blood: x / Protein: 30 mg/dL / Nitrite: Negative   Leuk Esterase: Trace / RBC: 0-2 /HPF / WBC 0-2   Sq Epi: x / Non Sq Epi: Occasional / Bacteria: Negative      ICU Vital Signs Last 24 Hrs  T(C): 36.7 (2021 00:13), Max: 37 (2021 05:32)  T(F): 98 (2021 00:13), Max: 98.6 (2021 05:32)  HR: 84 (2021 04:00) (71 - 86)  BP: 125/81 (2021 04:00) (110/70 - 151/90)  BP(mean): 88 (2021 04:00) (88 - 105)  ABP: --  ABP(mean): --  RR: 22 (2021 04:00) (18 - 25)  SpO2: 98% (2021 04:00) (93% - 99%)      ASSESSMENT :   ==============    Patient is a 56y old  Male who presents with a chief complaint of etoh abuse/  withdrawal (2021 14:31)        PLAN:  ========    NEURO:  -neuro checks per routine   - continue dexMEDEtomidine Infusion 0.2 MICROgram(s)/kG/Hr   - continue PHENobarbital Injectable 130 milliGRAM(s)    PULM:  -maintain O2 greater than 92    CV:  -continue metoprolol tartrate Injectable 2.5 milliGRAM(s)  -Maintain MAP > 65    GI:  -continue pantoprazole    Tablet 40 milliGRAM(s)  lactated ringers. 1000 milliLiter(s) IV Continuous      RENAL:  -maintain urine output > 0.5cc/kg/hr     :  CHAN yes [  ] NO [ X ] insertion date     ID:  -monitor for S/S of infection       ENDO:  -maintain blood glucose between 140-180    HEME:  heparin   Injectable 5000 Unit(s)    IMPROVE VTE Individual Risk Assessment  IMPROVE VTE Score ___0______      Pt is a 57 yo M with h/o HLD, COVID-19 infection Dec 2020, chronic ETOH abuse with multiple hospitalizations/ICU admissions 2 to ETOH withdrawal, DTs, alcoholic gastritis/esophagitis, PUD, GIB and hypoxic respiratory failure 2 to aspiration PNA. Pt presented 6/ 2 to vomiting and tremulous and c/o severe abdominal pain. Admitted to F 2 to ETOH withdrawal and gastritis. Early this am pt transferred to the ICU 2 to ETOH withdrawal/DTs    Resp: Elevate HOB  CVS: Pt on Precedex + BB and bradycardic; may need to change to other antiHTN med  HEME: DVT prophylaxis with Lovenox  FEN: NPO/ Daily Thiamine, MVI and Folate    Endo: Follow Glu  Neuro/Psych: Standing Phenobarb and titrate to pt's symptoms and may cont Precedex RRT / CODE Perez    Patient is a 56y old  Male who presents with a chief complaint of etoh abuse/  withdrawal (2021 14:31)      HPI:     : 56 year old male with h/o HLD, gastritis, alcohol dependence, DT's and PUD presents today c/o abdominal pain and ,   vomiting which started this morning,           pt has h/o alcohol abuse, last drank vodka two days ago,         pt presents  with   vomiting and  abdominal pain          has  stopped vomiting now/  has mild retching        (-) chest pain (-) Fevers (-) diarrhea   Pt has had  multiple visits, including icu admisssions     (2021 14:03)      Allergies/ Intolerances   No Known Allergies    MEDICATIONS  (STANDING):  chlorhexidine 2% Cloths 1 Application(s) Topical <User Schedule>  dexMEDEtomidine Infusion 0.2 MICROgram(s)/kG/Hr (3.58 mL/Hr) IV Continuous <Continuous>  heparin   Injectable 5000 Unit(s) SubCutaneous every 12 hours  lactated ringers. 1000 milliLiter(s) (125 mL/Hr) IV Continuous <Continuous>  metoprolol tartrate Injectable 2.5 milliGRAM(s) IV Push every 8 hours  pantoprazole    Tablet 40 milliGRAM(s) Oral before breakfast  PHENobarbital Injectable 130 milliGRAM(s) IV Push every 6 hours    MEDICATIONS  (PRN):  PHENobarbital Injectable 130 milliGRAM(s) IV Push every 2 hours PRN agitation, ciwa>8      Daily     Drug Dosing Weight  Height (cm): 170.2 (2021 06:57)  Weight (kg): 71.6 (2021 15:37)  BMI (kg/m2): 24.7 (2021 15:37)  BSA (m2): 1.83 (2021 15:37)    PAST MEDICAL & SURGICAL HISTORY:  PUD (peptic ulcer disease)    Mixed hyperlipidemia  EtOH dependence  Gastritis  Substance abuse  DTs (delirium tremens)  No significant past surgical history    FAMILY HISTORY:    SOCIAL HISTORY:    ADVANCE DIRECTIVES:    REVIEW OF SYSTEMS:    Review of systems within normal limits except for above       PHYSICAL EXAM:    GENERAL: NAD, well-groomed, well-developed  HEAD:  Atraumatic, Normocephalic  EYES: EOMI, PERRLA, conjunctiva and sclera clear  ENMT: No tonsillar erythema, exudates, or enlargement; Moist mucous membranes, Good dentition, No lesions  NECK: Supple, No JVD, Normal thyroid  NERVOUS SYSTEM:  Alert & Oriented X3, Good concentration; Motor Strength 5/5 B/L upper and lower extremities; DTRs 2+ intact and symmetric  CHEST/LUNG: Clear to percussion bilaterally; No rales, rhonchi, wheezing, or rubs  HEART: Regular rate and rhythm; No murmurs, rubs, or gallops  ABDOMEN: Soft, Nontender, Nondistended; Bowel sounds present  EXTREMITIES:  2+ Peripheral Pulses, No clubbing, cyanosis, or edema  LYMPH: No lymphadenopathy noted  SKIN: No rashes or lesions      LABS:  CBC Full  -  ( 2021 10:37 )  WBC Count : 5.83 K/uL  RBC Count : 4.65 M/uL  Hemoglobin : 11.2 g/dL  Hematocrit : 36.5 %  Platelet Count - Automated : 257 K/uL  Mean Cell Volume : 78.5 fl  Mean Cell Hemoglobin : 24.1 pg  Mean Cell Hemoglobin Concentration : 30.7 gm/dL      06-    141  |  106  |  16  ----------------------------<  133<H>  3.5   |  28  |  1.07    Ca    8.9      2021 10:37  Phos  2.2     06-05  Mg     2.1     06-05    TPro  7.7  /  Alb  3.7  /  TBili  0.8  /  DBili  .18  /  AST  31  /  ALT  23  /  AlkPhos  54  06-04    CAPILLARY BLOOD GLUCOSE      Urinalysis Basic - ( 2021 14:59 )    Color: Yellow / Appearance: Clear / S.010 / pH: x  Gluc: x / Ketone: Small  / Bili: Negative / Urobili: Negative mg/dL   Blood: x / Protein: 30 mg/dL / Nitrite: Negative   Leuk Esterase: Trace / RBC: 0-2 /HPF / WBC 0-2   Sq Epi: x / Non Sq Epi: Occasional / Bacteria: Negative      ICU Vital Signs Last 24 Hrs  T(C): 36.7 (2021 00:13), Max: 37 (2021 05:32)  T(F): 98 (2021 00:13), Max: 98.6 (2021 05:32)  HR: 84 (2021 04:00) (71 - 86)  BP: 125/81 (2021 04:00) (110/70 - 151/90)  BP(mean): 88 (2021 04:00) (88 - 105)  ABP: --  ABP(mean): --  RR: 22 (2021 04:00) (18 - 25)  SpO2: 98% (2021 04:00) (93% - 99%)      ASSESSMENT :   ==============    Patient is a 56y old  Male who presents with a chief complaint of etoh abuse/  withdrawal (2021 14:31)        PLAN:  ========    NEURO:  -neuro checks per routine   - continue dexMEDEtomidine Infusion 0.2 MICROgram(s)/kG/Hr   - continue PHENobarbital Injectable 130 milliGRAM(s)    PULM:  -maintain O2 greater than 92    CV:  -continue metoprolol tartrate Injectable 2.5 milliGRAM(s)  -Maintain MAP > 65    GI:  -continue pantoprazole    Tablet 40 milliGRAM(s)  lactated ringers. 1000 milliLiter(s) IV Continuous      RENAL:  -maintain urine output > 0.5cc/kg/hr     :  CHAN yes [  ] NO [ X ] insertion date     ID:  -monitor for S/S of infection       ENDO:  -maintain blood glucose between 140-180    HEME:  heparin   Injectable 5000 Unit(s)    IMPROVE VTE Individual Risk Assessment  IMPROVE VTE Score ___0______      Pt is a 57 yo M with h/o HLD, COVID-19 infection Dec 2020, chronic ETOH abuse with multiple hospitalizations/ICU admissions 2 to ETOH withdrawal, DTs, alcoholic gastritis/esophagitis, PUD, GIB and hypoxic respiratory failure 2 to aspiration PNA. Pt presented 6/ 2 to vomiting and tremulous and c/o severe abdominal pain. Admitted to F 2 to ETOH withdrawal and gastritis. Early this am pt transferred to the ICU 2 to ETOH withdrawal/DTs    Resp: Elevate HOB  CVS: Pt on Precedex + BB and bradycardic; may need to change to other antiHTN med  HEME: DVT prophylaxis with Lovenox  FEN: NPO/ Daily Thiamine, MVI and Folate/ Replace Phos; pt hypophosphatemic  Endo: Follow Glu  Neuro/Psych: Standing Phenobarb and titrate to pt's symptoms and may cont Precedex

## 2021-06-07 LAB
ANION GAP SERPL CALC-SCNC: 6 MMOL/L — SIGNIFICANT CHANGE UP (ref 5–17)
BUN SERPL-MCNC: 11 MG/DL — SIGNIFICANT CHANGE UP (ref 7–23)
CALCIUM SERPL-MCNC: 8.6 MG/DL — SIGNIFICANT CHANGE UP (ref 8.5–10.1)
CHLORIDE SERPL-SCNC: 111 MMOL/L — HIGH (ref 96–108)
CO2 SERPL-SCNC: 23 MMOL/L — SIGNIFICANT CHANGE UP (ref 22–31)
CREAT SERPL-MCNC: 0.88 MG/DL — SIGNIFICANT CHANGE UP (ref 0.5–1.3)
GLUCOSE SERPL-MCNC: 124 MG/DL — HIGH (ref 70–99)
HCT VFR BLD CALC: 36.2 % — LOW (ref 39–50)
HGB BLD-MCNC: 11.1 G/DL — LOW (ref 13–17)
MAGNESIUM SERPL-MCNC: 2 MG/DL — SIGNIFICANT CHANGE UP (ref 1.6–2.6)
MCHC RBC-ENTMCNC: 24.2 PG — LOW (ref 27–34)
MCHC RBC-ENTMCNC: 30.7 GM/DL — LOW (ref 32–36)
MCV RBC AUTO: 79 FL — LOW (ref 80–100)
NRBC # BLD: 0 /100 WBCS — SIGNIFICANT CHANGE UP (ref 0–0)
PHOSPHATE SERPL-MCNC: 2.6 MG/DL — SIGNIFICANT CHANGE UP (ref 2.5–4.5)
PLATELET # BLD AUTO: SIGNIFICANT CHANGE UP K/UL (ref 150–400)
POTASSIUM SERPL-MCNC: 3.8 MMOL/L — SIGNIFICANT CHANGE UP (ref 3.5–5.3)
POTASSIUM SERPL-SCNC: 3.8 MMOL/L — SIGNIFICANT CHANGE UP (ref 3.5–5.3)
RBC # BLD: 4.58 M/UL — SIGNIFICANT CHANGE UP (ref 4.2–5.8)
RBC # FLD: 16.3 % — HIGH (ref 10.3–14.5)
SODIUM SERPL-SCNC: 140 MMOL/L — SIGNIFICANT CHANGE UP (ref 135–145)
WBC # BLD: 6.36 K/UL — SIGNIFICANT CHANGE UP (ref 3.8–10.5)
WBC # FLD AUTO: 6.36 K/UL — SIGNIFICANT CHANGE UP (ref 3.8–10.5)

## 2021-06-07 PROCEDURE — 99223 1ST HOSP IP/OBS HIGH 75: CPT

## 2021-06-07 PROCEDURE — 99291 CRITICAL CARE FIRST HOUR: CPT

## 2021-06-07 RX ORDER — POTASSIUM CHLORIDE 20 MEQ
10 PACKET (EA) ORAL
Refills: 0 | Status: COMPLETED | OUTPATIENT
Start: 2021-06-07 | End: 2021-06-07

## 2021-06-07 RX ORDER — POTASSIUM PHOSPHATE, MONOBASIC POTASSIUM PHOSPHATE, DIBASIC 236; 224 MG/ML; MG/ML
15 INJECTION, SOLUTION INTRAVENOUS ONCE
Refills: 0 | Status: COMPLETED | OUTPATIENT
Start: 2021-06-07 | End: 2021-06-07

## 2021-06-07 RX ADMIN — Medication 100 MILLIGRAM(S): at 15:21

## 2021-06-07 RX ADMIN — Medication 100 MILLIEQUIVALENT(S): at 08:53

## 2021-06-07 RX ADMIN — Medication 130 MILLIGRAM(S): at 00:10

## 2021-06-07 RX ADMIN — Medication 130 MILLIGRAM(S): at 11:24

## 2021-06-07 RX ADMIN — DEXMEDETOMIDINE HYDROCHLORIDE IN 0.9% SODIUM CHLORIDE 3.58 MICROGRAM(S)/KG/HR: 4 INJECTION INTRAVENOUS at 17:26

## 2021-06-07 RX ADMIN — Medication 1 MILLIGRAM(S): at 11:24

## 2021-06-07 RX ADMIN — Medication 2.5 MILLIGRAM(S): at 14:06

## 2021-06-07 RX ADMIN — ENOXAPARIN SODIUM 40 MILLIGRAM(S): 100 INJECTION SUBCUTANEOUS at 12:44

## 2021-06-07 RX ADMIN — Medication 100 MILLIGRAM(S): at 12:44

## 2021-06-07 RX ADMIN — DEXMEDETOMIDINE HYDROCHLORIDE IN 0.9% SODIUM CHLORIDE 3.58 MICROGRAM(S)/KG/HR: 4 INJECTION INTRAVENOUS at 01:00

## 2021-06-07 RX ADMIN — DEXMEDETOMIDINE HYDROCHLORIDE IN 0.9% SODIUM CHLORIDE 3.58 MICROGRAM(S)/KG/HR: 4 INJECTION INTRAVENOUS at 22:02

## 2021-06-07 RX ADMIN — DEXTROSE MONOHYDRATE, SODIUM CHLORIDE, AND POTASSIUM CHLORIDE 110 MILLILITER(S): 50; .745; 4.5 INJECTION, SOLUTION INTRAVENOUS at 06:46

## 2021-06-07 RX ADMIN — Medication 100 MILLIGRAM(S): at 21:20

## 2021-06-07 RX ADMIN — DEXTROSE MONOHYDRATE, SODIUM CHLORIDE, AND POTASSIUM CHLORIDE 110 MILLILITER(S): 50; .745; 4.5 INJECTION, SOLUTION INTRAVENOUS at 15:46

## 2021-06-07 RX ADMIN — DEXTROSE MONOHYDRATE, SODIUM CHLORIDE, AND POTASSIUM CHLORIDE 110 MILLILITER(S): 50; .745; 4.5 INJECTION, SOLUTION INTRAVENOUS at 23:45

## 2021-06-07 RX ADMIN — Medication 130 MILLIGRAM(S): at 05:30

## 2021-06-07 RX ADMIN — CHLORHEXIDINE GLUCONATE 1 APPLICATION(S): 213 SOLUTION TOPICAL at 05:31

## 2021-06-07 RX ADMIN — Medication 100 MILLIEQUIVALENT(S): at 06:49

## 2021-06-07 RX ADMIN — DEXMEDETOMIDINE HYDROCHLORIDE IN 0.9% SODIUM CHLORIDE 3.58 MICROGRAM(S)/KG/HR: 4 INJECTION INTRAVENOUS at 03:00

## 2021-06-07 RX ADMIN — Medication 2.5 MILLIGRAM(S): at 05:30

## 2021-06-07 RX ADMIN — POTASSIUM PHOSPHATE, MONOBASIC POTASSIUM PHOSPHATE, DIBASIC 62.5 MILLIMOLE(S): 236; 224 INJECTION, SOLUTION INTRAVENOUS at 14:21

## 2021-06-07 RX ADMIN — DEXMEDETOMIDINE HYDROCHLORIDE IN 0.9% SODIUM CHLORIDE 3.58 MICROGRAM(S)/KG/HR: 4 INJECTION INTRAVENOUS at 10:05

## 2021-06-07 RX ADMIN — DEXMEDETOMIDINE HYDROCHLORIDE IN 0.9% SODIUM CHLORIDE 3.58 MICROGRAM(S)/KG/HR: 4 INJECTION INTRAVENOUS at 12:14

## 2021-06-07 RX ADMIN — Medication 100 MILLIEQUIVALENT(S): at 05:30

## 2021-06-07 RX ADMIN — DEXMEDETOMIDINE HYDROCHLORIDE IN 0.9% SODIUM CHLORIDE 3.58 MICROGRAM(S)/KG/HR: 4 INJECTION INTRAVENOUS at 06:40

## 2021-06-07 NOTE — PROCEDURE NOTE - NSPROCDETAILS_GEN_ALL_CORE
location identified, draped/prepped, sterile technique used/sterile dressing applied/sterile technique, catheter placed/supine position/Trendelenburg position/ultrasound guidance

## 2021-06-07 NOTE — PROGRESS NOTE ADULT - SUBJECTIVE AND OBJECTIVE BOX
HPI:     : 56 year old male          with h/o HLD, gastritis, alcohol dependence, DT's and PUD           presents today c/o abdominal pain and ,   vomiting which started this morning,           pt has h/o alcohol abuse, last drank vodka two days ago,         pt presents  with   vomiting and t abominal pain          has  stoppe d vomiting now/  has mild retching        (-) chest pain (-) Fevers (-) diarrhea       pt has had  mlple   visits, including icu admisssions     (2021 14:03)      24 hr events:    ## ROS:  [ ] unable to obtain  CONSTITUTIONAL: No fever, weight loss, or fatigue  EYES: No eye pain, visual disturbances, or discharge  ENMT:  No difficulty hearing, tinnitus, vertigo; No sinus or throat pain  NECK: No pain or stiffness  RESPIRATORY: No cough, wheezing, chills or hemoptysis; No shortness of breath  CARDIOVASCULAR: No chest pain, palpitations, dizziness, or leg swelling  GASTROINTESTINAL: No abdominal or epigastric pain. No nausea, vomiting, or hematemesis; No diarrhea or constipation. No melena or hematochezia.  GENITOURINARY: No dysuria, frequency, hematuria, or incontinence  NEUROLOGICAL: No headaches, memory loss, loss of strength, numbness, or tremors  SKIN: No itching, burning, rashes, or lesions   LYMPH NODES: No enlarged glands  ENDOCRINE: No heat or cold intolerance; No hair loss  MUSCULOSKELETAL: No joint pain or swelling; No muscle, back, or extremity pain  PSYCHIATRIC: No depression, anxiety, mood swings, or difficulty sleeping  HEME/LYMPH: No easy bruising, or bleeding gums  ALLERGY AND IMMUNOLOGIC: No hives or eczema    ## Vitals  ICU Vital Signs Last 24 Hrs  T(C): 37 (2021 04:00), Max: 37 (2021 04:00)  T(F): 98.6 (2021 04:00), Max: 98.6 (2021 04:00)  HR: 70 (2021 07:24) (52 - 77)  BP: 118/78 (2021 07:00) (98/65 - 154/86)  BP(mean): 86 (2021 07:00) (73 - 104)  ABP: --  ABP(mean): --  RR: 18 (2021 07:24) (13 - 22)  SpO2: 97% (2021 07:24) (90% - 100%)      ## Physical Exam:  Gen:  HEENT:  Resp:  CV:  Abd:  Ext:  Neuro:    ## Vent Data      ## Labs:  Chem:      140  |  111<H>  |  11  ----------------------------<  124<H>  3.8   |  23  |  0.88    Ca    8.6      2021 03:12  Phos  2.6     -  Mg     2.0         TPro  7.5  /  Alb  3.4  /  TBili  0.6  /  DBili  .12  /  AST  44<H>  /  ALT  27  /  AlkPhos  49      LIVER FUNCTIONS - ( 2021 16:10 )  Alb: 3.4 g/dL / Pro: 7.5 gm/dL / ALK PHOS: 49 U/L / ALT: 27 U/L / AST: 44 U/L / GGT: x           CBC:                        11.1   6.36  )-----------( Clotted    ( 2021 03:12 )             36.2     Coags:      Urinalysis Basic - ( 2021 14:59 )    Color: Yellow / Appearance: Clear / S.010 / pH: x  Gluc: x / Ketone: Small  / Bili: Negative / Urobili: Negative mg/dL   Blood: x / Protein: 30 mg/dL / Nitrite: Negative   Leuk Esterase: Trace / RBC: 0-2 /HPF / WBC 0-2   Sq Epi: x / Non Sq Epi: Occasional / Bacteria: Negative        ## Cardiac        ## Blood Gas      #I/Os  I&O's Detail    2021 07:01  -  2021 07:00  --------------------------------------------------------  IN:    Dexmedetomidine: 437 mL    dextrose 5% + sodium chloride 0.45% w/ Additives: 1320 mL    IV PiggyBack: 100 mL    Lactated Ringers: 625 mL  Total IN: 2482 mL    OUT:    Voided (mL): 950 mL  Total OUT: 950 mL    Total NET: 1532 mL          ## Imaging:    ## Medications:  MEDICATIONS  (STANDING):  chlorhexidine 2% Cloths 1 Application(s) Topical <User Schedule>  dexMEDEtomidine Infusion 0.2 MICROgram(s)/kG/Hr (3.58 mL/Hr) IV Continuous <Continuous>  dextrose 5% + sodium chloride 0.45% with potassium chloride 20 mEq/L 1000 milliLiter(s) (110 mL/Hr) IV Continuous <Continuous>  enoxaparin Injectable 40 milliGRAM(s) SubCutaneous every 24 hours  folic acid Injectable 1 milliGRAM(s) IV Push daily  metoprolol tartrate Injectable 2.5 milliGRAM(s) IV Push every 8 hours  pantoprazole    Tablet 40 milliGRAM(s) Oral before breakfast  PHENobarbital Injectable 130 milliGRAM(s) IV Push every 6 hours  potassium chloride  10 mEq/100 mL IVPB 10 milliEquivalent(s) IV Intermittent every 1 hour  thiamine Injectable 100 milliGRAM(s) IV Push daily    MEDICATIONS  (PRN):  PHENobarbital Injectable 130 milliGRAM(s) IV Push every 2 hours PRN agitation, ciwa>8       HPI:     : 56 year old male          with h/o HLD, gastritis, alcohol dependence, DT's and PUD           presents today c/o abdominal pain and ,   vomiting which started this morning,           pt has h/o alcohol abuse, last drank vodka two days ago,         pt presents  with   vomiting and t abominal pain          has  stoppe d vomiting now/  has mild retching        (-) chest pain (-) Fevers (-) diarrhea       pt has had  mlple   visits, including icu admisssions     (2021 14:03)      24 hr events: No acute events. Sedated, unable to provide history.    ## ROS:  [x ] unable to obtain      ## Vitals  ICU Vital Signs Last 24 Hrs  T(C): 37 (2021 04:00), Max: 37 (2021 04:00)  T(F): 98.6 (2021 04:00), Max: 98.6 (2021 04:00)  HR: 70 (2021 07:24) (52 - 77)  BP: 118/78 (2021 07:00) (98/65 - 154/86)  BP(mean): 86 (2021 07:00) (73 - 104)  ABP: --  ABP(mean): --  RR: 18 (2021 07:24) (13 - 22)  SpO2: 97% (2021 07:24) (90% - 100%)      ## Physical Exam:  Gen: Adult male lying in bed, NAD  HEENT: NC/AT sclerae anicteric  Resp: No increased WOB, CTAB  CV: S1, S2  Abd: Soft, + BS  Ext: WWP  Neuro: Sedated, unarousable     ## Vent Data      ## Labs:  Chem:  -    140  |  111<H>  |  11  ----------------------------<  124<H>  3.8   |  23  |  0.88    Ca    8.6      2021 03:12  Phos  2.6     06-07  Mg     2.0     06-07    TPro  7.5  /  Alb  3.4  /  TBili  0.6  /  DBili  .12  /  AST  44<H>  /  ALT  27  /  AlkPhos  49  06-06    LIVER FUNCTIONS - ( 2021 16:10 )  Alb: 3.4 g/dL / Pro: 7.5 gm/dL / ALK PHOS: 49 U/L / ALT: 27 U/L / AST: 44 U/L / GGT: x           CBC:                        11.1   6.36  )-----------( Clotted    ( 2021 03:12 )             36.2     Coags:      Urinalysis Basic - ( 2021 14:59 )    Color: Yellow / Appearance: Clear / S.010 / pH: x  Gluc: x / Ketone: Small  / Bili: Negative / Urobili: Negative mg/dL   Blood: x / Protein: 30 mg/dL / Nitrite: Negative   Leuk Esterase: Trace / RBC: 0-2 /HPF / WBC 0-2   Sq Epi: x / Non Sq Epi: Occasional / Bacteria: Negative        ## Cardiac        ## Blood Gas      #I/Os  I&O's Detail    2021 07:01  -  2021 07:00  --------------------------------------------------------  IN:    Dexmedetomidine: 437 mL    dextrose 5% + sodium chloride 0.45% w/ Additives: 1320 mL    IV PiggyBack: 100 mL    Lactated Ringers: 625 mL  Total IN: 2482 mL    OUT:    Voided (mL): 950 mL  Total OUT: 950 mL    Total NET: 1532 mL          ## Imaging:    ## Medications:  MEDICATIONS  (STANDING):  chlorhexidine 2% Cloths 1 Application(s) Topical <User Schedule>  dexMEDEtomidine Infusion 0.2 MICROgram(s)/kG/Hr (3.58 mL/Hr) IV Continuous <Continuous>  dextrose 5% + sodium chloride 0.45% with potassium chloride 20 mEq/L 1000 milliLiter(s) (110 mL/Hr) IV Continuous <Continuous>  enoxaparin Injectable 40 milliGRAM(s) SubCutaneous every 24 hours  folic acid Injectable 1 milliGRAM(s) IV Push daily  metoprolol tartrate Injectable 2.5 milliGRAM(s) IV Push every 8 hours  pantoprazole    Tablet 40 milliGRAM(s) Oral before breakfast  PHENobarbital Injectable 130 milliGRAM(s) IV Push every 6 hours  potassium chloride  10 mEq/100 mL IVPB 10 milliEquivalent(s) IV Intermittent every 1 hour  thiamine Injectable 100 milliGRAM(s) IV Push daily    MEDICATIONS  (PRN):  PHENobarbital Injectable 130 milliGRAM(s) IV Push every 2 hours PRN agitation, ciwa>8

## 2021-06-07 NOTE — PROCEDURAL SAFETY CHECKLIST WITH OR WITHOUT SEDATION - NSPRESEDATIONFT_GEN_ALL_CORE
KUB:

 

HISTORY: 

Renal calculus.

 

COMPARISON: 

11/4/16 study.

 

FINDINGS: 

The bowel gas patter is nonobstructive.  A lower pole left renal calculus is noted.  Gallstones are i
dentified.

 

IMPRESSION: 

1.  Approximately 5-6 mm lower pole left renal calculus.  On the prior exam, at least 2 calculi were 
identified where I only see 1 on the current study.

2.  Gallstones.

 

POS: STEVE Physician confirms case reviewed for anesthesia consultation requirements.

## 2021-06-07 NOTE — PROGRESS NOTE ADULT - ASSESSMENT
57 y/o M w/ETOH abuse, numerous admissions for ETOH withdrawal admitted again for ETOH withdrawal with delerium and behavioral disturbances.  57 y/o M w/ETOH abuse, numerous admissions for ETOH withdrawal admitted again for ETOH withdrawal with delerium and behavioral disturbances.     - Wean precedex as tolerated  - Librium 100mg q8hrs, Ativan PRN  - MVI, thiamine, folate    I have personally provided 35 minutes of attending critical care time excluding procedures. 55 y/o M w/ETOH abuse, numerous admissions for ETOH withdrawal admitted again for ETOH withdrawal with delerium and behavioral disturbances.     - Wean precedex as tolerated  - Librium 100mg q8hrs, Ativan PRN, d/c phenobarb  - MVI, thiamine, folate    I have personally provided 35 minutes of attending critical care time excluding procedures.

## 2021-06-08 LAB
ANION GAP SERPL CALC-SCNC: 6 MMOL/L — SIGNIFICANT CHANGE UP (ref 5–17)
BUN SERPL-MCNC: 7 MG/DL — SIGNIFICANT CHANGE UP (ref 7–23)
CALCIUM SERPL-MCNC: 8.3 MG/DL — LOW (ref 8.5–10.1)
CHLORIDE SERPL-SCNC: 111 MMOL/L — HIGH (ref 96–108)
CO2 SERPL-SCNC: 23 MMOL/L — SIGNIFICANT CHANGE UP (ref 22–31)
CREAT SERPL-MCNC: 0.9 MG/DL — SIGNIFICANT CHANGE UP (ref 0.5–1.3)
GLUCOSE SERPL-MCNC: 92 MG/DL — SIGNIFICANT CHANGE UP (ref 70–99)
HCT VFR BLD CALC: 32.5 % — LOW (ref 39–50)
HGB BLD-MCNC: 10.2 G/DL — LOW (ref 13–17)
MAGNESIUM SERPL-MCNC: 1.8 MG/DL — SIGNIFICANT CHANGE UP (ref 1.6–2.6)
MCHC RBC-ENTMCNC: 24.1 PG — LOW (ref 27–34)
MCHC RBC-ENTMCNC: 31.4 GM/DL — LOW (ref 32–36)
MCV RBC AUTO: 76.8 FL — LOW (ref 80–100)
NRBC # BLD: 0 /100 WBCS — SIGNIFICANT CHANGE UP (ref 0–0)
PHOSPHATE SERPL-MCNC: 2.6 MG/DL — SIGNIFICANT CHANGE UP (ref 2.5–4.5)
PLATELET # BLD AUTO: 220 K/UL — SIGNIFICANT CHANGE UP (ref 150–400)
POTASSIUM SERPL-MCNC: 3.2 MMOL/L — LOW (ref 3.5–5.3)
POTASSIUM SERPL-SCNC: 3.2 MMOL/L — LOW (ref 3.5–5.3)
RBC # BLD: 4.23 M/UL — SIGNIFICANT CHANGE UP (ref 4.2–5.8)
RBC # FLD: 16.4 % — HIGH (ref 10.3–14.5)
SODIUM SERPL-SCNC: 140 MMOL/L — SIGNIFICANT CHANGE UP (ref 135–145)
WBC # BLD: 6.68 K/UL — SIGNIFICANT CHANGE UP (ref 3.8–10.5)
WBC # FLD AUTO: 6.68 K/UL — SIGNIFICANT CHANGE UP (ref 3.8–10.5)

## 2021-06-08 PROCEDURE — 99291 CRITICAL CARE FIRST HOUR: CPT

## 2021-06-08 RX ORDER — THIAMINE MONONITRATE (VIT B1) 100 MG
100 TABLET ORAL DAILY
Refills: 0 | Status: DISCONTINUED | OUTPATIENT
Start: 2021-06-09 | End: 2021-06-10

## 2021-06-08 RX ORDER — FOLIC ACID 0.8 MG
1 TABLET ORAL DAILY
Refills: 0 | Status: DISCONTINUED | OUTPATIENT
Start: 2021-06-09 | End: 2021-06-10

## 2021-06-08 RX ORDER — SUCRALFATE 1 G
1 TABLET ORAL
Refills: 0 | Status: DISCONTINUED | OUTPATIENT
Start: 2021-06-08 | End: 2021-06-10

## 2021-06-08 RX ORDER — POTASSIUM CHLORIDE 20 MEQ
40 PACKET (EA) ORAL ONCE
Refills: 0 | Status: COMPLETED | OUTPATIENT
Start: 2021-06-08 | End: 2021-06-08

## 2021-06-08 RX ORDER — MAGNESIUM OXIDE 400 MG ORAL TABLET 241.3 MG
400 TABLET ORAL ONCE
Refills: 0 | Status: COMPLETED | OUTPATIENT
Start: 2021-06-08 | End: 2021-06-08

## 2021-06-08 RX ORDER — ONDANSETRON 8 MG/1
4 TABLET, FILM COATED ORAL ONCE
Refills: 0 | Status: COMPLETED | OUTPATIENT
Start: 2021-06-08 | End: 2021-06-08

## 2021-06-08 RX ADMIN — Medication 100 MILLIGRAM(S): at 11:22

## 2021-06-08 RX ADMIN — Medication 1 MILLIGRAM(S): at 11:22

## 2021-06-08 RX ADMIN — MAGNESIUM OXIDE 400 MG ORAL TABLET 400 MILLIGRAM(S): 241.3 TABLET ORAL at 05:16

## 2021-06-08 RX ADMIN — DEXMEDETOMIDINE HYDROCHLORIDE IN 0.9% SODIUM CHLORIDE 3.58 MICROGRAM(S)/KG/HR: 4 INJECTION INTRAVENOUS at 03:03

## 2021-06-08 RX ADMIN — Medication 1 GRAM(S): at 17:02

## 2021-06-08 RX ADMIN — Medication 1 GRAM(S): at 11:01

## 2021-06-08 RX ADMIN — DEXTROSE MONOHYDRATE, SODIUM CHLORIDE, AND POTASSIUM CHLORIDE 110 MILLILITER(S): 50; .745; 4.5 INJECTION, SOLUTION INTRAVENOUS at 06:04

## 2021-06-08 RX ADMIN — ONDANSETRON 4 MILLIGRAM(S): 8 TABLET, FILM COATED ORAL at 11:01

## 2021-06-08 RX ADMIN — ENOXAPARIN SODIUM 40 MILLIGRAM(S): 100 INJECTION SUBCUTANEOUS at 13:01

## 2021-06-08 RX ADMIN — Medication 2.5 MILLIGRAM(S): at 14:27

## 2021-06-08 RX ADMIN — Medication 1 GRAM(S): at 23:55

## 2021-06-08 RX ADMIN — Medication 2.5 MILLIGRAM(S): at 06:04

## 2021-06-08 RX ADMIN — Medication 2.5 MILLIGRAM(S): at 21:57

## 2021-06-08 RX ADMIN — Medication 100 MILLIGRAM(S): at 22:00

## 2021-06-08 RX ADMIN — Medication 100 MILLIGRAM(S): at 05:07

## 2021-06-08 RX ADMIN — DEXTROSE MONOHYDRATE, SODIUM CHLORIDE, AND POTASSIUM CHLORIDE 110 MILLILITER(S): 50; .745; 4.5 INJECTION, SOLUTION INTRAVENOUS at 17:00

## 2021-06-08 RX ADMIN — CHLORHEXIDINE GLUCONATE 1 APPLICATION(S): 213 SOLUTION TOPICAL at 05:16

## 2021-06-08 RX ADMIN — Medication 40 MILLIEQUIVALENT(S): at 05:07

## 2021-06-08 RX ADMIN — Medication 100 MILLIGRAM(S): at 14:27

## 2021-06-08 RX ADMIN — PANTOPRAZOLE SODIUM 40 MILLIGRAM(S): 20 TABLET, DELAYED RELEASE ORAL at 06:04

## 2021-06-08 NOTE — DIETITIAN INITIAL EVALUATION ADULT. - PERTINENT LABORATORY DATA
06-08 Na140 mmol/L Glu 92 mg/dL K+ 3.2 mmol/L<L> Cr  0.90 mg/dL BUN 7 mg/dL 06-08 Phos 2.6 mg/dL 06-06 Alb 3.4 g/dL

## 2021-06-08 NOTE — PROGRESS NOTE ADULT - ASSESSMENT
55 y/o M w/ETOH abuse, numerous admissions for ETOH withdrawal admitted again for ETOH withdrawal with delerium and behavioral disturbances.     - Wean precedex as tolerated  - Librium 100mg q8hrs, Ativan PRN  - MVI, thiamine, folate    I have personally provided 35 minutes of attending critical care time excluding procedures.

## 2021-06-08 NOTE — CHART NOTE - NSCHARTNOTEFT_GEN_A_CORE
56 year old man with a past medical history of HLD, gastritis, PUD and ETOH abuse with numerous admissions for ETOH withdrawals. He was admitted to the hospital on 6/4/21 for ETOH withdrawal and gastritis. Transferred to the ICU on 6/6 for ETOH withdrawal with delirium and behavioral disturbances. Patient was placed on precedex drip with improvement in delirium. Phenobarb was changed to librium scheduled and prn ativan for ETOH withdrawal. Patient has improved. Currently off Precedex drip and calm. No signs of DTs at this time. Patient is awake and alert, following commands, able to tolerate diet. On MVI, thiamine and folate. Electrolytes replaced. Patient seen and examined by ICU attending and deemed stable for transfer to the medical floor. Patient signed out to . ***  Will go under Dr. ** 56 year old man with a past medical history of HLD, gastritis, PUD and ETOH abuse with numerous admissions for ETOH withdrawals. He was admitted to the hospital on 6/4/21 for ETOH withdrawal and gastritis. Transferred to the ICU on 6/6 for ETOH withdrawal with delirium and behavioral disturbances. Patient was placed on precedex drip with improvement in delirium. Phenobarb was changed to librium scheduled and prn ativan for ETOH withdrawal. Patient has improved. Currently off Precedex drip and calm. No signs of DTs at this time. Patient is awake and alert, following commands, able to tolerate diet. On MVI, thiamine and folate. Electrolytes replaced. Patient seen and examined by ICU attending and deemed stable for transfer to the medical floor. Patient signed out to Dr. Johnson. Will go under Dr. Oviedo.

## 2021-06-08 NOTE — PROGRESS NOTE ADULT - SUBJECTIVE AND OBJECTIVE BOX
HPI:     : 56 year old male          with h/o HLD, gastritis, alcohol dependence, DT's and PUD           presents today c/o abdominal pain and ,   vomiting which started this morning,           pt has h/o alcohol abuse, last drank vodka two days ago,         pt presents  with   vomiting and t abominal pain          has  stoppe d vomiting now/  has mild retching        (-) chest pain (-) Fevers (-) diarrhea       pt has had  mlple   visits, including icu admisssions     (04 Jun 2021 14:03)      24 hr events: No acute events. Unable to provide history.     ## ROS:  [x ] unable to obtain      ## Vitals  ICU Vital Signs Last 24 Hrs  T(C): 36.6 (08 Jun 2021 07:06), Max: 37.2 (07 Jun 2021 08:00)  T(F): 97.8 (08 Jun 2021 07:06), Max: 99 (07 Jun 2021 08:00)  HR: 69 (08 Jun 2021 07:00) (53 - 77)  BP: 128/86 (08 Jun 2021 07:00) (99/73 - 156/95)  BP(mean): 97 (08 Jun 2021 07:00) (75 - 111)  ABP: --  ABP(mean): --  RR: 20 (08 Jun 2021 07:00) (15 - 25)  SpO2: 100% (08 Jun 2021 07:00) (94% - 100%)      ## Physical Exam:  Gen: Adult male lying in bed, NAD  HEENT: NC/AT sclerae anicteric  Resp: No increased WOB, CTAB  CV: S1, S2  Abd: Soft, + BS  Ext: WWP  Neuro: Sedated, unarousable     ## Vent Data      ## Labs:  Chem:  06-08    140  |  111<H>  |  7   ----------------------------<  92  3.2<L>   |  23  |  0.90    Ca    8.3<L>      08 Jun 2021 03:10  Phos  2.6     06-08  Mg     1.8     06-08    TPro  7.5  /  Alb  3.4  /  TBili  0.6  /  DBili  .12  /  AST  44<H>  /  ALT  27  /  AlkPhos  49  06-06    LIVER FUNCTIONS - ( 06 Jun 2021 16:10 )  Alb: 3.4 g/dL / Pro: 7.5 gm/dL / ALK PHOS: 49 U/L / ALT: 27 U/L / AST: 44 U/L / GGT: x           CBC:                        10.2   6.68  )-----------( 220      ( 08 Jun 2021 03:10 )             32.5     Coags:          ## Cardiac        ## Blood Gas      #I/Os  I&O's Detail    07 Jun 2021 07:01  -  08 Jun 2021 07:00  --------------------------------------------------------  IN:    Dexmedetomidine: 299.6 mL    dextrose 5% + sodium chloride 0.45% w/ Additives: 2530 mL    IV PiggyBack: 100 mL  Total IN: 2929.6 mL    OUT:    Incontinent per Condom Catheter (mL): 1100 mL  Total OUT: 1100 mL    Total NET: 1829.6 mL          ## Imaging:    ## Medications:  MEDICATIONS  (STANDING):  chlordiazePOXIDE 100 milliGRAM(s) Oral every 8 hours  chlorhexidine 2% Cloths 1 Application(s) Topical <User Schedule>  dexMEDEtomidine Infusion 0.2 MICROgram(s)/kG/Hr (3.58 mL/Hr) IV Continuous <Continuous>  dextrose 5% + sodium chloride 0.45% with potassium chloride 20 mEq/L 1000 milliLiter(s) (110 mL/Hr) IV Continuous <Continuous>  enoxaparin Injectable 40 milliGRAM(s) SubCutaneous every 24 hours  folic acid Injectable 1 milliGRAM(s) IV Push daily  metoprolol tartrate Injectable 2.5 milliGRAM(s) IV Push every 8 hours  pantoprazole    Tablet 40 milliGRAM(s) Oral before breakfast  thiamine Injectable 100 milliGRAM(s) IV Push daily    MEDICATIONS  (PRN):  LORazepam   Injectable 2 milliGRAM(s) IV Push every 4 hours PRN ciwa  >10       HPI:     : 56 year old male          with h/o HLD, gastritis, alcohol dependence, DT's and PUD           presents today c/o abdominal pain and ,   vomiting which started this morning,           pt has h/o alcohol abuse, last drank vodka two days ago,         pt presents  with   vomiting and t abominal pain          has  stoppe d vomiting now/  has mild retching        (-) chest pain (-) Fevers (-) diarrhea       pt has had  mlple   visits, including icu admisssions     (04 Jun 2021 14:03)      24 hr events: No acute events. Reports abdominal pain that is chornic. No chest pain, dyspnea, nausea, emesis, or diarrhea.     ## ROS:  See above. ROS otherwise negative.       ## Vitals  ICU Vital Signs Last 24 Hrs  T(C): 36.6 (08 Jun 2021 07:06), Max: 37.2 (07 Jun 2021 08:00)  T(F): 97.8 (08 Jun 2021 07:06), Max: 99 (07 Jun 2021 08:00)  HR: 69 (08 Jun 2021 07:00) (53 - 77)  BP: 128/86 (08 Jun 2021 07:00) (99/73 - 156/95)  BP(mean): 97 (08 Jun 2021 07:00) (75 - 111)  ABP: --  ABP(mean): --  RR: 20 (08 Jun 2021 07:00) (15 - 25)  SpO2: 100% (08 Jun 2021 07:00) (94% - 100%)      ## Physical Exam:  Gen: Adult male lying in bed, NAD  HEENT: NC/AT sclerae anicteric  Resp: No increased WOB, CTAB  CV: S1, S2  Abd: Soft, + BS  Ext: WWP  Neuro: Awake, calm, follows commands, responds appropriately to questions. Moves all extremities    ## Vent Data      ## Labs:  Chem:  06-08    140  |  111<H>  |  7   ----------------------------<  92  3.2<L>   |  23  |  0.90    Ca    8.3<L>      08 Jun 2021 03:10  Phos  2.6     06-08  Mg     1.8     06-08    TPro  7.5  /  Alb  3.4  /  TBili  0.6  /  DBili  .12  /  AST  44<H>  /  ALT  27  /  AlkPhos  49  06-06    LIVER FUNCTIONS - ( 06 Jun 2021 16:10 )  Alb: 3.4 g/dL / Pro: 7.5 gm/dL / ALK PHOS: 49 U/L / ALT: 27 U/L / AST: 44 U/L / GGT: x           CBC:                        10.2   6.68  )-----------( 220      ( 08 Jun 2021 03:10 )             32.5     Coags:          ## Cardiac        ## Blood Gas      #I/Os  I&O's Detail    07 Jun 2021 07:01  -  08 Jun 2021 07:00  --------------------------------------------------------  IN:    Dexmedetomidine: 299.6 mL    dextrose 5% + sodium chloride 0.45% w/ Additives: 2530 mL    IV PiggyBack: 100 mL  Total IN: 2929.6 mL    OUT:    Incontinent per Condom Catheter (mL): 1100 mL  Total OUT: 1100 mL    Total NET: 1829.6 mL          ## Imaging:    ## Medications:  MEDICATIONS  (STANDING):  chlordiazePOXIDE 100 milliGRAM(s) Oral every 8 hours  chlorhexidine 2% Cloths 1 Application(s) Topical <User Schedule>  dexMEDEtomidine Infusion 0.2 MICROgram(s)/kG/Hr (3.58 mL/Hr) IV Continuous <Continuous>  dextrose 5% + sodium chloride 0.45% with potassium chloride 20 mEq/L 1000 milliLiter(s) (110 mL/Hr) IV Continuous <Continuous>  enoxaparin Injectable 40 milliGRAM(s) SubCutaneous every 24 hours  folic acid Injectable 1 milliGRAM(s) IV Push daily  metoprolol tartrate Injectable 2.5 milliGRAM(s) IV Push every 8 hours  pantoprazole    Tablet 40 milliGRAM(s) Oral before breakfast  thiamine Injectable 100 milliGRAM(s) IV Push daily    MEDICATIONS  (PRN):  LORazepam   Injectable 2 milliGRAM(s) IV Push every 4 hours PRN ciwa  >10

## 2021-06-08 NOTE — DIETITIAN INITIAL EVALUATION ADULT. - WEIGHT IN KG
68.9
32 yo female with scalp laceration s/p fall, CT scan negative, lac repaired, tetanus UTD, neurologically intact, d/c home in a stable condition.

## 2021-06-08 NOTE — DIETITIAN INITIAL EVALUATION ADULT. - PERTINENT MEDS FT
MEDICATIONS  (STANDING):  chlordiazePOXIDE 100 milliGRAM(s) Oral every 8 hours  chlorhexidine 2% Cloths 1 Application(s) Topical <User Schedule>  dexMEDEtomidine Infusion 0.2 MICROgram(s)/kG/Hr (3.58 mL/Hr) IV Continuous <Continuous>  dextrose 5% + sodium chloride 0.45% with potassium chloride 20 mEq/L 1000 milliLiter(s) (110 mL/Hr) IV Continuous <Continuous>  enoxaparin Injectable 40 milliGRAM(s) SubCutaneous every 24 hours  folic acid Injectable 1 milliGRAM(s) IV Push daily  metoprolol tartrate Injectable 2.5 milliGRAM(s) IV Push every 8 hours  ondansetron Injectable 4 milliGRAM(s) IV Push once  pantoprazole    Tablet 40 milliGRAM(s) Oral before breakfast  sucralfate 1 Gram(s) Oral four times a day  thiamine Injectable 100 milliGRAM(s) IV Push daily    MEDICATIONS  (PRN):  LORazepam   Injectable 2 milliGRAM(s) IV Push every 4 hours PRN ciwa  >10

## 2021-06-08 NOTE — DIETITIAN INITIAL EVALUATION ADULT. - OTHER INFO
Pt adm w/ETOH abuse/withdrawal. Per chart pt is sedated and disoriented. Pt has been NPO x 2 days, diet advanced this am. Prior to NPO pt noted w/% intake of meals on Regular diet. Pt w/multiple past admissions due to same dx. Per chart pt was vomiting and had abdominal pain PTA, vomiting now resolved. No N/V/D/C reported. Per previous adm records pt Hx of difficulty swallowing and aspiration pna. Suggest obtain Swallow evaluation for appropriate diet/fluid consistency. Weight per prev ad (04/28/21)= 68.9 kg w/1+ generalized and 3+ hand edema. Wt gain, as below, likely due to edema fluctuations.

## 2021-06-09 LAB
ANION GAP SERPL CALC-SCNC: 8 MMOL/L — SIGNIFICANT CHANGE UP (ref 5–17)
BUN SERPL-MCNC: 8 MG/DL — SIGNIFICANT CHANGE UP (ref 7–23)
CALCIUM SERPL-MCNC: 8.4 MG/DL — LOW (ref 8.5–10.1)
CHLORIDE SERPL-SCNC: 110 MMOL/L — HIGH (ref 96–108)
CO2 SERPL-SCNC: 21 MMOL/L — LOW (ref 22–31)
CREAT SERPL-MCNC: 0.95 MG/DL — SIGNIFICANT CHANGE UP (ref 0.5–1.3)
GLUCOSE SERPL-MCNC: 83 MG/DL — SIGNIFICANT CHANGE UP (ref 70–99)
HCT VFR BLD CALC: 29.8 % — LOW (ref 39–50)
HGB BLD-MCNC: 9.6 G/DL — LOW (ref 13–17)
MAGNESIUM SERPL-MCNC: 1.9 MG/DL — SIGNIFICANT CHANGE UP (ref 1.6–2.6)
MCHC RBC-ENTMCNC: 24.6 PG — LOW (ref 27–34)
MCHC RBC-ENTMCNC: 32.2 GM/DL — SIGNIFICANT CHANGE UP (ref 32–36)
MCV RBC AUTO: 76.2 FL — LOW (ref 80–100)
NRBC # BLD: 0 /100 WBCS — SIGNIFICANT CHANGE UP (ref 0–0)
PHOSPHATE SERPL-MCNC: 3.5 MG/DL — SIGNIFICANT CHANGE UP (ref 2.5–4.5)
PLATELET # BLD AUTO: 249 K/UL — SIGNIFICANT CHANGE UP (ref 150–400)
POTASSIUM SERPL-MCNC: 3.2 MMOL/L — LOW (ref 3.5–5.3)
POTASSIUM SERPL-SCNC: 3.2 MMOL/L — LOW (ref 3.5–5.3)
RBC # BLD: 3.91 M/UL — LOW (ref 4.2–5.8)
RBC # FLD: 16.6 % — HIGH (ref 10.3–14.5)
SODIUM SERPL-SCNC: 139 MMOL/L — SIGNIFICANT CHANGE UP (ref 135–145)
WBC # BLD: 3.71 K/UL — LOW (ref 3.8–10.5)
WBC # FLD AUTO: 3.71 K/UL — LOW (ref 3.8–10.5)

## 2021-06-09 PROCEDURE — 99232 SBSQ HOSP IP/OBS MODERATE 35: CPT

## 2021-06-09 RX ORDER — LIDOCAINE 4 G/100G
1 CREAM TOPICAL DAILY
Refills: 0 | Status: DISCONTINUED | OUTPATIENT
Start: 2021-06-09 | End: 2021-06-10

## 2021-06-09 RX ORDER — POTASSIUM CHLORIDE 20 MEQ
10 PACKET (EA) ORAL
Refills: 0 | Status: COMPLETED | OUTPATIENT
Start: 2021-06-09 | End: 2021-06-09

## 2021-06-09 RX ORDER — POTASSIUM CHLORIDE 20 MEQ
40 PACKET (EA) ORAL ONCE
Refills: 0 | Status: COMPLETED | OUTPATIENT
Start: 2021-06-09 | End: 2021-06-09

## 2021-06-09 RX ORDER — METOPROLOL TARTRATE 50 MG
25 TABLET ORAL DAILY
Refills: 0 | Status: DISCONTINUED | OUTPATIENT
Start: 2021-06-09 | End: 2021-06-10

## 2021-06-09 RX ORDER — ACETAMINOPHEN 500 MG
650 TABLET ORAL ONCE
Refills: 0 | Status: COMPLETED | OUTPATIENT
Start: 2021-06-09 | End: 2021-06-09

## 2021-06-09 RX ADMIN — Medication 1 MILLIGRAM(S): at 12:37

## 2021-06-09 RX ADMIN — LIDOCAINE 1 PATCH: 4 CREAM TOPICAL at 23:59

## 2021-06-09 RX ADMIN — Medication 1 GRAM(S): at 12:37

## 2021-06-09 RX ADMIN — Medication 40 MILLIEQUIVALENT(S): at 09:58

## 2021-06-09 RX ADMIN — Medication 100 MILLIEQUIVALENT(S): at 11:11

## 2021-06-09 RX ADMIN — Medication 1 GRAM(S): at 23:07

## 2021-06-09 RX ADMIN — DEXTROSE MONOHYDRATE, SODIUM CHLORIDE, AND POTASSIUM CHLORIDE 75 MILLILITER(S): 50; .745; 4.5 INJECTION, SOLUTION INTRAVENOUS at 06:04

## 2021-06-09 RX ADMIN — PANTOPRAZOLE SODIUM 40 MILLIGRAM(S): 20 TABLET, DELAYED RELEASE ORAL at 08:19

## 2021-06-09 RX ADMIN — Medication 1 GRAM(S): at 17:40

## 2021-06-09 RX ADMIN — Medication 100 MILLIEQUIVALENT(S): at 12:08

## 2021-06-09 RX ADMIN — Medication 100 MILLIEQUIVALENT(S): at 09:58

## 2021-06-09 RX ADMIN — Medication 100 MILLIGRAM(S): at 12:37

## 2021-06-09 RX ADMIN — Medication 2.5 MILLIGRAM(S): at 06:06

## 2021-06-09 RX ADMIN — Medication 650 MILLIGRAM(S): at 23:54

## 2021-06-09 RX ADMIN — Medication 1 GRAM(S): at 06:05

## 2021-06-09 RX ADMIN — Medication 100 MILLIGRAM(S): at 06:04

## 2021-06-09 RX ADMIN — Medication 100 MILLIGRAM(S): at 21:38

## 2021-06-09 RX ADMIN — Medication 100 MILLIGRAM(S): at 13:53

## 2021-06-09 RX ADMIN — ENOXAPARIN SODIUM 40 MILLIGRAM(S): 100 INJECTION SUBCUTANEOUS at 12:37

## 2021-06-09 NOTE — PROGRESS NOTE ADULT - SUBJECTIVE AND OBJECTIVE BOX
INTERVAL HPI/OVERNIGHT EVENTS: Pt seen and examined.   56y  Vital Signs Last 24 Hrs  T(C): 36.9 (09 Jun 2021 11:52), Max: 37.1 (09 Jun 2021 05:39)  T(F): 98.5 (09 Jun 2021 11:52), Max: 98.8 (09 Jun 2021 05:39)  HR: 74 (09 Jun 2021 11:52) (72 - 99)  BP: 111/73 (09 Jun 2021 11:52) (111/73 - 123/83)  BP(mean): 90 (08 Jun 2021 17:00) (90 - 90)  RR: 18 (09 Jun 2021 11:52) (16 - 18)  SpO2: 97% (09 Jun 2021 11:52) (97% - 100%)  I&O's Summary    08 Jun 2021 07:01  -  09 Jun 2021 07:00  --------------------------------------------------------  IN: 2004.6 mL / OUT: 2150 mL / NET: -145.4 mL      MEDICATIONS  (STANDING):  chlordiazePOXIDE 100 milliGRAM(s) Oral every 8 hours  dextrose 5% + sodium chloride 0.45% with potassium chloride 20 mEq/L 1000 milliLiter(s) (75 mL/Hr) IV Continuous <Continuous>  enoxaparin Injectable 40 milliGRAM(s) SubCutaneous every 24 hours  folic acid 1 milliGRAM(s) Oral daily  metoprolol succinate ER 25 milliGRAM(s) Oral daily  pantoprazole    Tablet 40 milliGRAM(s) Oral before breakfast  sucralfate 1 Gram(s) Oral four times a day  thiamine 100 milliGRAM(s) Oral daily    MEDICATIONS  (PRN):  LORazepam   Injectable 2 milliGRAM(s) IV Push every 4 hours PRN ciwa  >10    LABS:    trop                        9.6    3.71  )-----------( 249      ( 09 Jun 2021 07:45 )             29.8     06-09    139  |  110<H>  |  8   ----------------------------<  83  3.2<L>   |  21<L>  |  0.95    Ca    8.4<L>      09 Jun 2021 07:45  Phos  3.5     06-09  Mg     1.9     06-09          CAPILLARY BLOOD GLUCOSE              REVIEW OF SYSTEMS:  CONSTITUTIONAL: No fever, weight loss, or fatigue  RESPIRATORY: No cough, wheezing, chills or hemoptysis; No shortness of breath  CARDIOVASCULAR: No chest pain, palpitations, dizziness, or leg swelling  GASTROINTESTINAL: No abdominal or epigastric pain. No nausea, vomiting, or hematemesis; No diarrhea or constipation. No melena or hematochezia.  GENITOURINARY: No dysuria, frequency, hematuria, or incontinence  NEUROLOGICAL: No headaches, memory loss, loss of strength, numbness, or tremors  MUSCULOSKELETAL: No joint pain or swelling; No muscle, back, or extremity pain      RADIOLOGY & ADDITIONAL TESTS:    Imaging Personally Reviewed:  [ ] YES  [ ] NO    Consultant(s) Notes Reviewed:  [x ] YES  [ ] NO    PHYSICAL EXAM:  GENERAL: NAD  NERVOUS SYSTEM:  No focal deficits  CHEST/LUNG: b/l air entry  HEART: Regular rate and rhythm  ABDOMEN: Soft, Nontender, Nondistended; Bowel sounds present  EXTREMITIES:  2+ Peripheral Pulses      A & P:        Care Discussed with Consultants/Other Providers [ x] YES  [ ] NO

## 2021-06-09 NOTE — PROGRESS NOTE ADULT - ASSESSMENT
56 year old man with a past medical history of HTN, gastritis, PUD and ETOH abuse with numerous admissions for ETOH withdrawals. He was admitted to the hospital on 6/4/21 for ETOH withdrawal and gastritis. Transferred to the ICU on 6/6 for ETOH withdrawal with delirium and behavioral disturbances. Patient was placed on precedex drip with improvement in delirium.     1. ETOH withdrawal   c/w librium protocol   ativan prn   c/w thiamine, folate, MVI    2. HTN   c/w metoprolol    DVT ppx  on lovenox

## 2021-06-09 NOTE — PATIENT PROFILE ADULT - OVER THE PAST TWO WEEKS, HAVE YOU FELT LITTLE INTEREST OR PLEASURE IN DOING THINGS?
no Alternatives Discussed Intro (Do Not Add Period): I discussed alternative treatments to Mohs surgery and specifically discussed the risks and benefits of

## 2021-06-10 ENCOUNTER — TRANSCRIPTION ENCOUNTER (OUTPATIENT)
Age: 56
End: 2021-06-10

## 2021-06-10 VITALS
DIASTOLIC BLOOD PRESSURE: 88 MMHG | RESPIRATION RATE: 17 BRPM | OXYGEN SATURATION: 98 % | SYSTOLIC BLOOD PRESSURE: 148 MMHG | TEMPERATURE: 98 F | HEART RATE: 64 BPM

## 2021-06-10 LAB
ANION GAP SERPL CALC-SCNC: 8 MMOL/L — SIGNIFICANT CHANGE UP (ref 5–17)
BUN SERPL-MCNC: 13 MG/DL — SIGNIFICANT CHANGE UP (ref 7–23)
CALCIUM SERPL-MCNC: 8.8 MG/DL — SIGNIFICANT CHANGE UP (ref 8.5–10.1)
CHLORIDE SERPL-SCNC: 112 MMOL/L — HIGH (ref 96–108)
CO2 SERPL-SCNC: 21 MMOL/L — LOW (ref 22–31)
CREAT SERPL-MCNC: 0.99 MG/DL — SIGNIFICANT CHANGE UP (ref 0.5–1.3)
GLUCOSE SERPL-MCNC: 92 MG/DL — SIGNIFICANT CHANGE UP (ref 70–99)
HCT VFR BLD CALC: 35.3 % — LOW (ref 39–50)
HGB BLD-MCNC: 10.8 G/DL — LOW (ref 13–17)
MCHC RBC-ENTMCNC: 23.9 PG — LOW (ref 27–34)
MCHC RBC-ENTMCNC: 30.6 GM/DL — LOW (ref 32–36)
MCV RBC AUTO: 78.3 FL — LOW (ref 80–100)
NRBC # BLD: 0 /100 WBCS — SIGNIFICANT CHANGE UP (ref 0–0)
PLATELET # BLD AUTO: 281 K/UL — SIGNIFICANT CHANGE UP (ref 150–400)
POTASSIUM SERPL-MCNC: 4.1 MMOL/L — SIGNIFICANT CHANGE UP (ref 3.5–5.3)
POTASSIUM SERPL-SCNC: 4.1 MMOL/L — SIGNIFICANT CHANGE UP (ref 3.5–5.3)
RBC # BLD: 4.51 M/UL — SIGNIFICANT CHANGE UP (ref 4.2–5.8)
RBC # FLD: 16.9 % — HIGH (ref 10.3–14.5)
SODIUM SERPL-SCNC: 141 MMOL/L — SIGNIFICANT CHANGE UP (ref 135–145)
WBC # BLD: 3.09 K/UL — LOW (ref 3.8–10.5)
WBC # FLD AUTO: 3.09 K/UL — LOW (ref 3.8–10.5)

## 2021-06-10 PROCEDURE — 99239 HOSP IP/OBS DSCHRG MGMT >30: CPT

## 2021-06-10 RX ORDER — CHOLECALCIFEROL (VITAMIN D3) 125 MCG
1 CAPSULE ORAL
Qty: 0 | Refills: 0 | DISCHARGE

## 2021-06-10 RX ORDER — MAGNESIUM OXIDE 400 MG ORAL TABLET 241.3 MG
1 TABLET ORAL
Qty: 0 | Refills: 0 | DISCHARGE

## 2021-06-10 RX ORDER — ACETAMINOPHEN 500 MG
650 TABLET ORAL EVERY 4 HOURS
Refills: 0 | Status: DISCONTINUED | OUTPATIENT
Start: 2021-06-10 | End: 2021-06-10

## 2021-06-10 RX ORDER — CX-024414 0.2 MG/ML
0.5 INJECTION, SUSPENSION INTRAMUSCULAR ONCE
Refills: 0 | Status: COMPLETED | OUTPATIENT
Start: 2021-06-10 | End: 2021-06-10

## 2021-06-10 RX ORDER — POTASSIUM CHLORIDE 20 MEQ
40 PACKET (EA) ORAL EVERY 4 HOURS
Refills: 0 | Status: DISCONTINUED | OUTPATIENT
Start: 2021-06-10 | End: 2021-06-10

## 2021-06-10 RX ADMIN — LIDOCAINE 1 PATCH: 4 CREAM TOPICAL at 08:25

## 2021-06-10 RX ADMIN — Medication 100 MILLIGRAM(S): at 05:28

## 2021-06-10 RX ADMIN — ENOXAPARIN SODIUM 40 MILLIGRAM(S): 100 INJECTION SUBCUTANEOUS at 15:38

## 2021-06-10 RX ADMIN — Medication 1 MILLIGRAM(S): at 11:44

## 2021-06-10 RX ADMIN — Medication 100 MILLIGRAM(S): at 15:38

## 2021-06-10 RX ADMIN — LIDOCAINE 1 PATCH: 4 CREAM TOPICAL at 11:45

## 2021-06-10 RX ADMIN — LIDOCAINE 1 PATCH: 4 CREAM TOPICAL at 11:44

## 2021-06-10 RX ADMIN — DEXTROSE MONOHYDRATE, SODIUM CHLORIDE, AND POTASSIUM CHLORIDE 75 MILLILITER(S): 50; .745; 4.5 INJECTION, SOLUTION INTRAVENOUS at 05:30

## 2021-06-10 RX ADMIN — Medication 650 MILLIGRAM(S): at 15:38

## 2021-06-10 RX ADMIN — Medication 25 MILLIGRAM(S): at 05:28

## 2021-06-10 RX ADMIN — PANTOPRAZOLE SODIUM 40 MILLIGRAM(S): 20 TABLET, DELAYED RELEASE ORAL at 09:58

## 2021-06-10 RX ADMIN — Medication 1 GRAM(S): at 05:28

## 2021-06-10 RX ADMIN — Medication 1 GRAM(S): at 11:44

## 2021-06-10 RX ADMIN — CX-024414 0.5 MILLILITER(S): 0.2 INJECTION, SUSPENSION INTRAMUSCULAR at 15:38

## 2021-06-10 RX ADMIN — Medication 650 MILLIGRAM(S): at 00:24

## 2021-06-10 RX ADMIN — Medication 40 MILLIEQUIVALENT(S): at 09:58

## 2021-06-10 RX ADMIN — Medication 100 MILLIGRAM(S): at 11:44

## 2021-06-10 NOTE — DISCHARGE NOTE PROVIDER - NSDCMRMEDTOKEN_GEN_ALL_CORE_FT
aspirin 81 mg oral delayed release tablet: 1 tab(s) orally once a day  atorvastatin 20 mg oral tablet: 1 tab(s) orally once a day  famotidine 40 mg oral tablet: 1 tab(s) orally 2 times a day with food  folic acid 1 mg oral tablet: 1 tab(s) orally once a day  Metoprolol Tartrate 25 mg oral tablet: 1 tab(s) orally 2 times a day  Multiple Vitamins oral tablet: 1 tab(s) orally once a day  Protonix 40 mg oral delayed release tablet: 1 tab(s) orally once a day  telmisartan 40 mg oral tablet: orally 2 times a day  Vitamin B1 100 mg oral tablet: 1 tab(s) orally once a day

## 2021-06-10 NOTE — DISCHARGE NOTE PROVIDER - PROVIDER TOKENS
FREE:[LAST:[PCP],PHONE:[(   )    -],FAX:[(   )    -],ADDRESS:[known to patient],FOLLOWUP:[1-3 days],ESTABLISHEDPATIENT:[T]]

## 2021-06-10 NOTE — DISCHARGE NOTE PROVIDER - NSDCCPCAREPLAN_GEN_ALL_CORE_FT
PRINCIPAL DISCHARGE DIAGNOSIS  Diagnosis: Alcohol withdrawal  Assessment and Plan of Treatment: continue to take medications, please follow up with PCP for out patient detox

## 2021-06-10 NOTE — DISCHARGE NOTE NURSING/CASE MANAGEMENT/SOCIAL WORK - PATIENT PORTAL LINK FT
You can access the FollowMyHealth Patient Portal offered by [facility] by registering at the following website: [email or web address]. By joining [facility]’s FollowMyHealth portal, you will also be able to view your health information using other applications (apps) compatible with our system.

## 2021-06-10 NOTE — DISCHARGE NOTE PROVIDER - CARE PROVIDER_API CALL
PCP,   known to patient  Phone: (   )    -  Fax: (   )    -  Established Patient  Follow Up Time: 1-3 days

## 2021-06-10 NOTE — DISCHARGE NOTE NURSING/CASE MANAGEMENT/SOCIAL WORK - NSDCVIVACCINE_GEN_ALL_CORE_FT
Severe acute respiratory syndrome coronavirus 2 (SARS-CoV-2) (Coronavirus disease [COVID-19]) vaccine , 2021/6/10 15:38 , Reji Hernandez (RN)  Influenza , 2019/1/31 15:53 , Ayaka Bynum (RN)  Influenza , 2019/11/10 14:13 , Laverne Lane (RN)  Influenza , 2020/10/13 11:56 , Kimberli Cronin (RN)  Td , 2020/1/18 20:04 , Yulia Vance (RN)

## 2021-06-10 NOTE — PHARMACY COMMUNICATION NOTE - COMMENTS
Spoke to PA and patient received first dose a few weeks ago and confirmed with RN. RN has paperwork to confirm.

## 2021-06-15 DIAGNOSIS — E78.2 MIXED HYPERLIPIDEMIA: ICD-10-CM

## 2021-06-15 DIAGNOSIS — F10.239 ALCOHOL DEPENDENCE WITH WITHDRAWAL, UNSPECIFIED: ICD-10-CM

## 2021-06-15 DIAGNOSIS — N17.9 ACUTE KIDNEY FAILURE, UNSPECIFIED: ICD-10-CM

## 2021-06-15 DIAGNOSIS — K76.0 FATTY (CHANGE OF) LIVER, NOT ELSEWHERE CLASSIFIED: ICD-10-CM

## 2021-06-15 DIAGNOSIS — E87.2 ACIDOSIS: ICD-10-CM

## 2021-06-15 DIAGNOSIS — Z23 ENCOUNTER FOR IMMUNIZATION: ICD-10-CM
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
## 2021-06-15 DIAGNOSIS — F10.231 ALCOHOL DEPENDENCE WITH WITHDRAWAL DELIRIUM: ICD-10-CM

## 2021-06-15 DIAGNOSIS — E86.0 DEHYDRATION: ICD-10-CM

## 2021-06-15 DIAGNOSIS — R00.1 BRADYCARDIA, UNSPECIFIED: ICD-10-CM

## 2021-06-15 DIAGNOSIS — K20.90 ESOPHAGITIS, UNSPECIFIED WITHOUT BLEEDING: ICD-10-CM

## 2021-06-15 DIAGNOSIS — K29.20 ALCOHOLIC GASTRITIS WITHOUT BLEEDING: ICD-10-CM

## 2021-06-15 DIAGNOSIS — F10.229 ALCOHOL DEPENDENCE WITH INTOXICATION, UNSPECIFIED: ICD-10-CM

## 2021-06-15 DIAGNOSIS — Z20.822 CONTACT WITH AND (SUSPECTED) EXPOSURE TO COVID-19: ICD-10-CM

## 2021-07-07 ENCOUNTER — INPATIENT (INPATIENT)
Facility: HOSPITAL | Age: 56
LOS: 4 days | Discharge: HOME | End: 2021-07-12
Attending: STUDENT IN AN ORGANIZED HEALTH CARE EDUCATION/TRAINING PROGRAM | Admitting: STUDENT IN AN ORGANIZED HEALTH CARE EDUCATION/TRAINING PROGRAM
Payer: MEDICAID

## 2021-07-07 VITALS
DIASTOLIC BLOOD PRESSURE: 90 MMHG | WEIGHT: 169.98 LBS | SYSTOLIC BLOOD PRESSURE: 142 MMHG | HEIGHT: 67 IN | TEMPERATURE: 99 F | OXYGEN SATURATION: 96 % | HEART RATE: 140 BPM | RESPIRATION RATE: 22 BRPM

## 2021-07-07 LAB
ACETONE SERPL-MCNC: NEGATIVE — SIGNIFICANT CHANGE UP
ALBUMIN SERPL ELPH-MCNC: 4.3 G/DL — SIGNIFICANT CHANGE UP (ref 3.3–5)
ALP SERPL-CCNC: 71 U/L — SIGNIFICANT CHANGE UP (ref 40–120)
ALT FLD-CCNC: 29 U/L — SIGNIFICANT CHANGE UP (ref 12–78)
ANION GAP SERPL CALC-SCNC: 23 MMOL/L — HIGH (ref 5–17)
APAP SERPL-MCNC: < 2 UG/ML (ref 10–30)
AST SERPL-CCNC: 26 U/L — SIGNIFICANT CHANGE UP (ref 15–37)
BASE EXCESS BLDA CALC-SCNC: -9.9 MMOL/L — LOW (ref -2–2)
BASOPHILS # BLD AUTO: 0.06 K/UL — SIGNIFICANT CHANGE UP (ref 0–0.2)
BASOPHILS NFR BLD AUTO: 1.2 % — SIGNIFICANT CHANGE UP (ref 0–2)
BILIRUB SERPL-MCNC: 0.3 MG/DL — SIGNIFICANT CHANGE UP (ref 0.2–1.2)
BLOOD GAS COMMENTS: SIGNIFICANT CHANGE UP
BLOOD GAS COMMENTS: SIGNIFICANT CHANGE UP
BLOOD GAS SOURCE: SIGNIFICANT CHANGE UP
BUN SERPL-MCNC: 20 MG/DL — SIGNIFICANT CHANGE UP (ref 7–23)
CALCIUM SERPL-MCNC: 9 MG/DL — SIGNIFICANT CHANGE UP (ref 8.5–10.1)
CHLORIDE SERPL-SCNC: 101 MMOL/L — SIGNIFICANT CHANGE UP (ref 96–108)
CO2 SERPL-SCNC: 15 MMOL/L — LOW (ref 22–31)
CREAT SERPL-MCNC: 1.57 MG/DL — HIGH (ref 0.5–1.3)
EOSINOPHIL # BLD AUTO: 0 K/UL — SIGNIFICANT CHANGE UP (ref 0–0.5)
EOSINOPHIL NFR BLD AUTO: 0 % — SIGNIFICANT CHANGE UP (ref 0–6)
ETHANOL SERPL-MCNC: 340 MG/DL — HIGH (ref 0–10)
FLUAV AG NPH QL: SIGNIFICANT CHANGE UP
FLUBV AG NPH QL: SIGNIFICANT CHANGE UP
GLUCOSE SERPL-MCNC: 150 MG/DL — HIGH (ref 70–99)
HCO3 BLDA-SCNC: 14 MMOL/L — LOW (ref 21–29)
HCT VFR BLD CALC: 42.5 % — SIGNIFICANT CHANGE UP (ref 39–50)
HGB BLD-MCNC: 13.5 G/DL — SIGNIFICANT CHANGE UP (ref 13–17)
HOROWITZ INDEX BLDA+IHG-RTO: 21 — SIGNIFICANT CHANGE UP
IMM GRANULOCYTES NFR BLD AUTO: 0.2 % — SIGNIFICANT CHANGE UP (ref 0–1.5)
LACTATE SERPL-SCNC: 14.3 MMOL/L — CRITICAL HIGH (ref 0.7–2)
LIDOCAIN IGE QN: 132 U/L — SIGNIFICANT CHANGE UP (ref 73–393)
LYMPHOCYTES # BLD AUTO: 1.72 K/UL — SIGNIFICANT CHANGE UP (ref 1–3.3)
LYMPHOCYTES # BLD AUTO: 35.2 % — SIGNIFICANT CHANGE UP (ref 13–44)
MAGNESIUM SERPL-MCNC: 2.5 MG/DL — SIGNIFICANT CHANGE UP (ref 1.6–2.6)
MCHC RBC-ENTMCNC: 23.3 PG — LOW (ref 27–34)
MCHC RBC-ENTMCNC: 31.8 GM/DL — LOW (ref 32–36)
MCV RBC AUTO: 73.3 FL — LOW (ref 80–100)
MONOCYTES # BLD AUTO: 0.27 K/UL — SIGNIFICANT CHANGE UP (ref 0–0.9)
MONOCYTES NFR BLD AUTO: 5.5 % — SIGNIFICANT CHANGE UP (ref 2–14)
NEUTROPHILS # BLD AUTO: 2.82 K/UL — SIGNIFICANT CHANGE UP (ref 1.8–7.4)
NEUTROPHILS NFR BLD AUTO: 57.9 % — SIGNIFICANT CHANGE UP (ref 43–77)
NRBC # BLD: 0 /100 WBCS — SIGNIFICANT CHANGE UP (ref 0–0)
PCO2 BLDA: 28 MMHG — LOW (ref 32–46)
PH BLD: 7.33 — LOW (ref 7.35–7.45)
PHOSPHATE SERPL-MCNC: 3.1 MG/DL — SIGNIFICANT CHANGE UP (ref 2.5–4.5)
PLATELET # BLD AUTO: 316 K/UL — SIGNIFICANT CHANGE UP (ref 150–400)
PO2 BLDA: 105 MMHG — SIGNIFICANT CHANGE UP (ref 74–108)
POTASSIUM SERPL-MCNC: 3.7 MMOL/L — SIGNIFICANT CHANGE UP (ref 3.5–5.3)
POTASSIUM SERPL-SCNC: 3.7 MMOL/L — SIGNIFICANT CHANGE UP (ref 3.5–5.3)
PROT SERPL-MCNC: 9.8 GM/DL — HIGH (ref 6–8.3)
RBC # BLD: 5.8 M/UL — SIGNIFICANT CHANGE UP (ref 4.2–5.8)
RBC # FLD: 18.6 % — HIGH (ref 10.3–14.5)
SALICYLATES SERPL-MCNC: <1.7 MG/DL — LOW (ref 2.8–20)
SAO2 % BLDA: 96 % — SIGNIFICANT CHANGE UP (ref 92–96)
SARS-COV-2 RNA SPEC QL NAA+PROBE: SIGNIFICANT CHANGE UP
SODIUM SERPL-SCNC: 139 MMOL/L — SIGNIFICANT CHANGE UP (ref 135–145)
WBC # BLD: 4.88 K/UL — SIGNIFICANT CHANGE UP (ref 3.8–10.5)
WBC # FLD AUTO: 4.88 K/UL — SIGNIFICANT CHANGE UP (ref 3.8–10.5)

## 2021-07-07 PROCEDURE — 93010 ELECTROCARDIOGRAM REPORT: CPT

## 2021-07-07 PROCEDURE — 73120 X-RAY EXAM OF HAND: CPT | Mod: 26,LT,RT

## 2021-07-07 PROCEDURE — 99285 EMERGENCY DEPT VISIT HI MDM: CPT

## 2021-07-07 RX ORDER — MORPHINE SULFATE 50 MG/1
4 CAPSULE, EXTENDED RELEASE ORAL ONCE
Refills: 0 | Status: DISCONTINUED | OUTPATIENT
Start: 2021-07-07 | End: 2021-07-07

## 2021-07-07 RX ORDER — SODIUM CHLORIDE 9 MG/ML
1000 INJECTION, SOLUTION INTRAVENOUS ONCE
Refills: 0 | Status: COMPLETED | OUTPATIENT
Start: 2021-07-07 | End: 2021-07-07

## 2021-07-07 RX ORDER — FOLIC ACID 0.8 MG
1 TABLET ORAL ONCE
Refills: 0 | Status: COMPLETED | OUTPATIENT
Start: 2021-07-07 | End: 2021-07-07

## 2021-07-07 RX ORDER — FAMOTIDINE 10 MG/ML
20 INJECTION INTRAVENOUS ONCE
Refills: 0 | Status: DISCONTINUED | OUTPATIENT
Start: 2021-07-07 | End: 2021-07-07

## 2021-07-07 RX ORDER — ONDANSETRON 8 MG/1
4 TABLET, FILM COATED ORAL ONCE
Refills: 0 | Status: COMPLETED | OUTPATIENT
Start: 2021-07-07 | End: 2021-07-07

## 2021-07-07 RX ORDER — SODIUM CHLORIDE 9 MG/ML
1000 INJECTION, SOLUTION INTRAVENOUS ONCE
Refills: 0 | Status: COMPLETED | OUTPATIENT
Start: 2021-07-07 | End: 2021-07-08

## 2021-07-07 RX ORDER — THIAMINE MONONITRATE (VIT B1) 100 MG
100 TABLET ORAL ONCE
Refills: 0 | Status: COMPLETED | OUTPATIENT
Start: 2021-07-07 | End: 2021-07-07

## 2021-07-07 RX ADMIN — Medication 100 MILLIGRAM(S): at 21:47

## 2021-07-07 RX ADMIN — Medication 1 MILLIGRAM(S): at 21:47

## 2021-07-07 RX ADMIN — MORPHINE SULFATE 4 MILLIGRAM(S): 50 CAPSULE, EXTENDED RELEASE ORAL at 22:20

## 2021-07-07 RX ADMIN — MORPHINE SULFATE 4 MILLIGRAM(S): 50 CAPSULE, EXTENDED RELEASE ORAL at 23:52

## 2021-07-07 RX ADMIN — MORPHINE SULFATE 4 MILLIGRAM(S): 50 CAPSULE, EXTENDED RELEASE ORAL at 22:50

## 2021-07-07 RX ADMIN — MORPHINE SULFATE 4 MILLIGRAM(S): 50 CAPSULE, EXTENDED RELEASE ORAL at 23:22

## 2021-07-07 RX ADMIN — Medication 2 MILLIGRAM(S): at 23:22

## 2021-07-07 NOTE — ED ADULT TRIAGE NOTE - CHIEF COMPLAINT QUOTE
Pt biba with c/o ETOH intox, Vomiting,  B/L hand swelling, redness, and  warmth. as per ems , wife stated pt had 1L OF VODKA TODAY

## 2021-07-07 NOTE — ED PROVIDER NOTE - PROGRESS NOTE DETAILS
Nurses tried to place IV line x 5, failed, ED physician tried x 4, failed. Will give IM medications as needed for now. Patient is most likely in AKA with elevated AG and low bicarb, with ph 7.33, however, treatment is supportive care/dextrose. IV access by nurse, sp 500 ml fluids lactate improved from 14 to 10. likely secondary to ETOH and component of mild alcoholic ketoacidosis ((+) BHB. (+) high AG, (+) low bicarb, ph 7.33, in setting of binge drinking, abdominal pain and nausea/vomiting). CT AP negative. Labs otherwise with mild ROSA. Will need hydration/dextrose, and supportive care.

## 2021-07-07 NOTE — ED ADULT NURSE NOTE - OBJECTIVE STATEMENT
pt received to bed 16 c/o nausea, vomiting, alcohol intox. c/o bilateral hand swelling, redness and warmth. pt is A&Ox2 and is a poor historian. pt is lethargic, not very talkative and is not cooperative in responding to questions. on cardiac monitor at bedside

## 2021-07-07 NOTE — ED PROVIDER NOTE - PHYSICAL EXAMINATION
VITAL SIGNS: I have reviewed nursing notes and confirm.   GEN: mal-nourished, ETOH on breath, disheveled, intoxicated. Follows commands. somnolent  SKIN: Warm, pink, no rash, no diaphoresis, no cyanosis, well perfused.   HEAD: Normocephalic; atraumatic. No scalp lacerations, no abrasions.  NECK: Supple; non tender.   EYES: Pupils 3mm equal, round, reactive to light and accomodation, conjunctiva and sclera clear. Extra-ocular movements intact bilaterally.  ENT: No nasal discharge; airway clear. Trachea is midline. ORAL: No oropharyngeal exudates or erythema. Normal dentition.  CV: (+) tachycardia. S1, S2 normal; no murmurs, gallops, or rubs. No lower extremity pitting edema bilaterally. Capillary refill < 2 seconds throughout. Distal pulses intact 2+ throughout.  RESP: CTA bilaterally. No wheezes, rales, or rhonchi.   ABD: Normal bowel sounds, soft, non-distended, non-tender, no hepatosplenomegaly. No CVA tenderness bilaterally.  MSK: Normal range of motion and movement of all 4 extremities. No joint or muscular pain throughout. No clubbing. HANDS: B/L: distal to wrist swelling of hand with mild erythema and warmth. cap refill < 2sec. (+) active/passive full rom of hands/wrist/fingers without difficulty or pain. No deformities. Neurovascularly intact.   BACK: No thoracolumbar midline or paravertebral tenderness. No step-offs or obvious deformities.  NEURO: Alert & oriented x name and place, Grossly unremarkable. Sensory and motor intact throughout. No focal deficits.

## 2021-07-07 NOTE — ED ADULT NURSE NOTE - ED STAT RN HANDOFF DETAILS
Report endorsed to hold RN Tere. Safety checks completed this shift. Safety rounds completed hourly.  IV sites checked Q2+remains WDL. Medications administered as ordered with no signs/symptoms of adverse reactions. Fall & skin precautions in place. Any issues endorsed to hold RN for follow up. Awaiting bed assignment. on cardiac monitor at bedside. awaiting morning labs and medications Report endorsed to hold RN Tere. Safety checks completed this shift. Safety rounds completed hourly.  IV sites checked Q2+remains WDL. Medications administered as ordered with no signs/symptoms of adverse reactions. Fall & skin precautions in place. Any issues endorsed to hold RN for follow up. Awaiting bed assignment. on cardiac monitor at bedside. awaiting morning labs, medications, and LR. LR infusing at this time

## 2021-07-07 NOTE — ED PROVIDER NOTE - OBJECTIVE STATEMENT
56M PMHx of HTN, gastritis/PUD, ETOH abuse (prior seizures/admissions/ICU w/ DTs), multiple ED visits for ETOH intoxication presenting with ETOH intoxication and b/l hand pain. Patient is a poor historian, also intoxicated. 56M PMHx of HTN, gastritis/PUD, ETOH abuse (prior seizures/admissions/ICU w/ DTs), multiple ED visits for ETOH intoxication presenting with ETOH intoxication and b/l hand pain. Patient is a poor historian, also intoxicated. Reporting chronic abdominal pain associated w/ nausea/vomiting. Admitted to drinking 1 bottle of vodka today.

## 2021-07-07 NOTE — ED PROVIDER NOTE - CLINICAL SUMMARY MEDICAL DECISION MAKING FREE TEXT BOX
ETOH intoxication, B/L chronic hand swelling but erythematous/warmth ddx: cellulitis, no fluctuance or lymphangitis.   - CIWA, labs rule out AKA  - ABD SOFT ND NT but (+) N/V likely secondary to binge drinking.  - re-eval for dispo

## 2021-07-07 NOTE — ED ADULT NURSE REASSESSMENT NOTE - NS ED NURSE REASSESS COMMENT FT1
IV line placed by Dr. Ware in left upper arm is infiltrated. pt has no IV access. unable to administer fluids or medications. Dr. Ware made aware. pt is on cardiac monitor at bedside IV line placed by Dr. Ware in left upper arm. upon flushing IV line, prior to administering medications and IV, found IV to be infiltrated. IV therefore removed. pt has no IV access. unable to administer fluids or medications. Dr. Ware made aware. pt is on cardiac monitor at bedside

## 2021-07-07 NOTE — ED PROVIDER NOTE - CARE PLAN
Principal Discharge DX:	Alcoholic ketoacidosis  Secondary Diagnosis:	Non-intractable vomiting with nausea

## 2021-07-08 LAB
ANION GAP SERPL CALC-SCNC: 11 MMOL/L — SIGNIFICANT CHANGE UP (ref 5–17)
APPEARANCE UR: CLEAR — SIGNIFICANT CHANGE UP
B-OH-BUTYR SERPL-SCNC: 0.3 MMOL/L — SIGNIFICANT CHANGE UP
B-OH-BUTYR SERPL-SCNC: 0.7 MMOL/L — HIGH
BACTERIA # UR AUTO: ABNORMAL
BILIRUB UR-MCNC: NEGATIVE — SIGNIFICANT CHANGE UP
BUN SERPL-MCNC: 24 MG/DL — HIGH (ref 7–23)
CALCIUM SERPL-MCNC: 8.7 MG/DL — SIGNIFICANT CHANGE UP (ref 8.5–10.1)
CHLORIDE SERPL-SCNC: 103 MMOL/L — SIGNIFICANT CHANGE UP (ref 96–108)
CO2 SERPL-SCNC: 24 MMOL/L — SIGNIFICANT CHANGE UP (ref 22–31)
COLOR SPEC: YELLOW — SIGNIFICANT CHANGE UP
CREAT SERPL-MCNC: 1.19 MG/DL — SIGNIFICANT CHANGE UP (ref 0.5–1.3)
DIFF PNL FLD: ABNORMAL
EPI CELLS # UR: SIGNIFICANT CHANGE UP
GLUCOSE SERPL-MCNC: 161 MG/DL — HIGH (ref 70–99)
GLUCOSE UR QL: NEGATIVE MG/DL — SIGNIFICANT CHANGE UP
HYALINE CASTS # UR AUTO: ABNORMAL /LPF
KETONES UR-MCNC: ABNORMAL
LACTATE SERPL-SCNC: 10.2 MMOL/L — CRITICAL HIGH (ref 0.7–2)
LACTATE SERPL-SCNC: 4.1 MMOL/L — CRITICAL HIGH (ref 0.7–2)
LEUKOCYTE ESTERASE UR-ACNC: NEGATIVE — SIGNIFICANT CHANGE UP
MAGNESIUM SERPL-MCNC: 2.2 MG/DL — SIGNIFICANT CHANGE UP (ref 1.6–2.6)
NITRITE UR-MCNC: NEGATIVE — SIGNIFICANT CHANGE UP
PH UR: 5 — SIGNIFICANT CHANGE UP (ref 5–8)
POTASSIUM SERPL-MCNC: 4.5 MMOL/L — SIGNIFICANT CHANGE UP (ref 3.5–5.3)
POTASSIUM SERPL-SCNC: 4.5 MMOL/L — SIGNIFICANT CHANGE UP (ref 3.5–5.3)
PROT UR-MCNC: 100 MG/DL
RBC CASTS # UR COMP ASSIST: SIGNIFICANT CHANGE UP /HPF (ref 0–4)
SODIUM SERPL-SCNC: 138 MMOL/L — SIGNIFICANT CHANGE UP (ref 135–145)
SP GR SPEC: 1.02 — SIGNIFICANT CHANGE UP (ref 1.01–1.02)
UROBILINOGEN FLD QL: NEGATIVE MG/DL — SIGNIFICANT CHANGE UP
WBC UR QL: SIGNIFICANT CHANGE UP

## 2021-07-08 PROCEDURE — 99223 1ST HOSP IP/OBS HIGH 75: CPT

## 2021-07-08 PROCEDURE — 74176 CT ABD & PELVIS W/O CONTRAST: CPT | Mod: 26,MA

## 2021-07-08 RX ORDER — METOPROLOL TARTRATE 50 MG
25 TABLET ORAL DAILY
Refills: 0 | Status: DISCONTINUED | OUTPATIENT
Start: 2021-07-08 | End: 2021-07-09

## 2021-07-08 RX ORDER — THIAMINE MONONITRATE (VIT B1) 100 MG
100 TABLET ORAL DAILY
Refills: 0 | Status: DISCONTINUED | OUTPATIENT
Start: 2021-07-08 | End: 2021-07-09

## 2021-07-08 RX ORDER — SODIUM CHLORIDE 9 MG/ML
1000 INJECTION, SOLUTION INTRAVENOUS
Refills: 0 | Status: DISCONTINUED | OUTPATIENT
Start: 2021-07-08 | End: 2021-07-11

## 2021-07-08 RX ORDER — FOLIC ACID 0.8 MG
1 TABLET ORAL DAILY
Refills: 0 | Status: DISCONTINUED | OUTPATIENT
Start: 2021-07-08 | End: 2021-07-09

## 2021-07-08 RX ORDER — SODIUM CHLORIDE 9 MG/ML
1000 INJECTION, SOLUTION INTRAVENOUS ONCE
Refills: 0 | Status: COMPLETED | OUTPATIENT
Start: 2021-07-08 | End: 2021-07-08

## 2021-07-08 RX ORDER — SODIUM CHLORIDE 9 MG/ML
1000 INJECTION, SOLUTION INTRAVENOUS
Refills: 0 | Status: COMPLETED | OUTPATIENT
Start: 2021-07-08 | End: 2021-07-08

## 2021-07-08 RX ORDER — ENOXAPARIN SODIUM 100 MG/ML
40 INJECTION SUBCUTANEOUS DAILY
Refills: 0 | Status: DISCONTINUED | OUTPATIENT
Start: 2021-07-08 | End: 2021-07-12

## 2021-07-08 RX ORDER — PANTOPRAZOLE SODIUM 20 MG/1
40 TABLET, DELAYED RELEASE ORAL
Refills: 0 | Status: DISCONTINUED | OUTPATIENT
Start: 2021-07-08 | End: 2021-07-09

## 2021-07-08 RX ORDER — MORPHINE SULFATE 50 MG/1
2 CAPSULE, EXTENDED RELEASE ORAL EVERY 6 HOURS
Refills: 0 | Status: DISCONTINUED | OUTPATIENT
Start: 2021-07-08 | End: 2021-07-09

## 2021-07-08 RX ADMIN — SODIUM CHLORIDE 125 MILLILITER(S): 9 INJECTION, SOLUTION INTRAVENOUS at 08:17

## 2021-07-08 RX ADMIN — Medication 1 MILLIGRAM(S): at 11:13

## 2021-07-08 RX ADMIN — MORPHINE SULFATE 4 MILLIGRAM(S): 50 CAPSULE, EXTENDED RELEASE ORAL at 02:25

## 2021-07-08 RX ADMIN — SODIUM CHLORIDE 1000 MILLILITER(S): 9 INJECTION, SOLUTION INTRAVENOUS at 14:23

## 2021-07-08 RX ADMIN — Medication 2 MILLIGRAM(S): at 06:25

## 2021-07-08 RX ADMIN — ENOXAPARIN SODIUM 40 MILLIGRAM(S): 100 INJECTION SUBCUTANEOUS at 11:13

## 2021-07-08 RX ADMIN — MORPHINE SULFATE 2 MILLIGRAM(S): 50 CAPSULE, EXTENDED RELEASE ORAL at 07:05

## 2021-07-08 RX ADMIN — MORPHINE SULFATE 4 MILLIGRAM(S): 50 CAPSULE, EXTENDED RELEASE ORAL at 02:10

## 2021-07-08 RX ADMIN — SODIUM CHLORIDE 125 MILLILITER(S): 9 INJECTION, SOLUTION INTRAVENOUS at 16:43

## 2021-07-08 RX ADMIN — Medication 25 MILLIGRAM(S): at 06:25

## 2021-07-08 RX ADMIN — PANTOPRAZOLE SODIUM 40 MILLIGRAM(S): 20 TABLET, DELAYED RELEASE ORAL at 06:25

## 2021-07-08 RX ADMIN — Medication 100 MILLIGRAM(S): at 11:13

## 2021-07-08 RX ADMIN — MORPHINE SULFATE 2 MILLIGRAM(S): 50 CAPSULE, EXTENDED RELEASE ORAL at 06:25

## 2021-07-08 RX ADMIN — SODIUM CHLORIDE 1000 MILLILITER(S): 9 INJECTION, SOLUTION INTRAVENOUS at 05:23

## 2021-07-08 RX ADMIN — ONDANSETRON 4 MILLIGRAM(S): 8 TABLET, FILM COATED ORAL at 02:05

## 2021-07-08 RX ADMIN — MORPHINE SULFATE 2 MILLIGRAM(S): 50 CAPSULE, EXTENDED RELEASE ORAL at 17:00

## 2021-07-08 RX ADMIN — Medication 2 MILLIGRAM(S): at 19:24

## 2021-07-08 RX ADMIN — Medication 2 MILLIGRAM(S): at 21:39

## 2021-07-08 RX ADMIN — Medication 2 MILLIGRAM(S): at 17:26

## 2021-07-08 RX ADMIN — Medication 2 MILLIGRAM(S): at 13:46

## 2021-07-08 RX ADMIN — Medication 2 MILLIGRAM(S): at 09:37

## 2021-07-08 RX ADMIN — SODIUM CHLORIDE 1000 MILLILITER(S): 9 INJECTION, SOLUTION INTRAVENOUS at 07:45

## 2021-07-08 RX ADMIN — MORPHINE SULFATE 2 MILLIGRAM(S): 50 CAPSULE, EXTENDED RELEASE ORAL at 16:45

## 2021-07-08 RX ADMIN — MORPHINE SULFATE 2 MILLIGRAM(S): 50 CAPSULE, EXTENDED RELEASE ORAL at 23:25

## 2021-07-08 RX ADMIN — SODIUM CHLORIDE 1000 MILLILITER(S): 9 INJECTION, SOLUTION INTRAVENOUS at 02:05

## 2021-07-08 NOTE — ED ADULT NURSE REASSESSMENT NOTE - NS ED NURSE REASSESS COMMENT FT1
Dr. Ware aware of limited IV access and being unable to administer additional fluids at this time. 1L LR infusing at this time. pt is on cardiac monitor at bedside. no acute distress noted. will continue to monitor

## 2021-07-08 NOTE — H&P ADULT - HISTORY OF PRESENT ILLNESS
56M PMHx of HTN, gastritis/PUD, ETOH abuse (prior seizures/admissions/ICU w/ DTs), multiple ED visits for ETOH intoxication presenting with ETOH intoxication and b/l hand pain(x2days). Patient is a poor historian, also intoxicated. Reporting chronic abdominal pain associated w/ nausea/vomiting. Admitted to drinking 1 bottle of vodka today.

## 2021-07-08 NOTE — H&P ADULT - ASSESSMENT
56M PMHx of HTN, gastritis/PUD, ETOH abuse (prior seizures/admissions/ICU w/ DTs), multiple ED visits for ETOH intoxication presenting with ETOH intoxication and b/l hand pain(x2days). Patient is a poor historian, also intoxicated. Reporting chronic abdominal pain associated w/ nausea/vomiting. Admitted to drinking 1 bottle of vodka today.    EtoH withdrawal  -ciwa protocol  -folate/thiamine    Etoh Ketoacidosis  -s/p 3L of LR/thiamine  -c/w ivf, trend lactate, vbg, bhydroxybut    ROSA  -c/w ivf    B/L hand pain  -exam benign, xrays done in ed  -etoh neuropathy?  -c/w pain meds

## 2021-07-08 NOTE — H&P ADULT - NSHPREVIEWOFSYSTEMS_GEN_ALL_CORE
CONSTITUTIONAL: No fever, weight loss, or fatigue  EYES: No eye pain, visual disturbances, or discharge  ENMT:  No difficulty hearing, tinnitus, vertigo; No sinus or throat pain  NECK: No pain or stiffness  BREASTS: No pain, masses, or nipple discharge  RESPIRATORY: No cough, wheezing, chills or hemoptysis; No shortness of breath  CARDIOVASCULAR: No chest pain, palpitations, dizziness, or leg swelling  GASTROINTESTINAL: seehpi  GENITOURINARY: No dysuria, frequency, hematuria, or incontinence  NEUROLOGICAL: No headaches, memory loss, loss of strength, numbness, or tremors  SKIN: No itching, burning, rashes, or lesions   LYMPH NODES: No enlarged glands  ENDOCRINE: No heat or cold intolerance; No hair loss  MUSCULOSKELETAL: No joint pain or swelling; No muscle, back, or extremity pain  PSYCHIATRIC: No depression, anxiety, mood swings, or difficulty sleeping  HEME/LYMPH: No easy bruising, or bleeding gums  ALLERGY AND IMMUNOLOGIC: No hives or eczema

## 2021-07-08 NOTE — H&P ADULT - NSHPPHYSICALEXAM_GEN_ALL_CORE
Vital Signs Last 24 Hrs  T(C): 36.8 (08 Jul 2021 02:05), Max: 37.1 (07 Jul 2021 19:19)  T(F): 98.3 (08 Jul 2021 02:05), Max: 98.8 (07 Jul 2021 19:19)  HR: 121 (08 Jul 2021 06:00) (117 - 140)  BP: 151/95 (08 Jul 2021 06:00) (134/90 - 151/97)  BP(mean): --  RR: 18 (08 Jul 2021 06:00) (18 - 24)  SpO2: 93% (08 Jul 2021 06:00) (93% - 96%)    GENERAL: NAD, shaky disheveled   HEAD:  Atraumatic, Normocephalic  EYES: EOMI, PERRLA, conjunctiva and sclera clear  ENMT: No tonsillar erythema, exudates, or enlargement; Moist mucous membranes, Good dentition, No lesions  NECK: Supple, No JVD, Normal thyroid  NERVOUS SYSTEM:  Alert & Oriented X3, Good concentration; Motor Strength 5/5 B/L upper and lower extremities; DTRs 2+ intact and symmetric  CHEST/LUNG: Clear to percussion bilaterally; No rales, rhonchi, wheezing, or rubs  HEART: Regular rate and rhythm; No murmurs, rubs, or gallops  ABDOMEN: Soft, Nontender, Nondistended; Bowel sounds present  EXTREMITIES:  2+ Peripheral Pulses, No clubbing, cyanosis, or edema  LYMPH: No lymphadenopathy   SKIN: No rashes or lesions

## 2021-07-08 NOTE — H&P ADULT - TIME BILLING
Lab test review, Radiology Review, Vitals review, Consultant review and discussion, Physical examination,  Assessment and plan; Plan discussion with patient and family

## 2021-07-08 NOTE — PROGRESS NOTE ADULT - SUBJECTIVE AND OBJECTIVE BOX
Patient is a 56y old  Male who presents with a chief complaint of etoh/ ketoacidosis (2021 06:12)      OVERNIGHT EVENTS:    REVIEW OF SYSTEMS: denies chest pain/SOB, diaphoresis, no F/C, cough, dizziness, headache, blurry vision, nausea, vomiting, abdominal pain. All others review of systems negative     MEDICATIONS  (STANDING):  enoxaparin Injectable 40 milliGRAM(s) SubCutaneous daily  folic acid 1 milliGRAM(s) Oral daily  lactated ringers. 1000 milliLiter(s) (125 mL/Hr) IV Continuous <Continuous>  LORazepam   Injectable 2 milliGRAM(s) IV Push every 4 hours  LORazepam   Injectable   IV Push   metoprolol succinate ER 25 milliGRAM(s) Oral daily  pantoprazole    Tablet 40 milliGRAM(s) Oral before breakfast  thiamine 100 milliGRAM(s) Oral daily    MEDICATIONS  (PRN):  LORazepam   Injectable 2 milliGRAM(s) IV Push every 2 hours PRN Symptom-triggered: 2 point increase in CIWA -Ar score and a total score of 7 or LESS  LORazepam   Injectable 2 milliGRAM(s) IV Push every 1 hour PRN Symptom-triggered: each CIWA -Ar score 8 or GREATER  morphine  - Injectable 2 milliGRAM(s) IV Push every 6 hours PRN Severe Pain (7 - 10)      Allergies    No Known Allergies    Intolerances        T(F): 99 (21 @ 15:21), Max: 99.4 (21 @ 14:08)  HR: 72 (21 @ 15:21) (65 - 140)  BP: 144/79 (21 @ 15:21) (134/82 - 151/97)  RR: 18 (21 @ 15:21) (13 - 24)  SpO2: 96% (21 @ 15:21) (93% - 96%)  Wt(kg): --    PHYSICAL EXAM:  GENERAL: NAD  HEAD:  Atraumatic, Normocephalic  EYES: PERRLA, conjunctiva and sclera clear  ENMT: Moist mucous membranes  NECK: Supple, No JVD, Normal thyroid  NERVOUS SYSTEM:  Alert & Awake  CHEST/LUNG: Clear to percussion bilaterally;   HEART: Regular rate and rhythm;   ABDOMEN: Soft, Nontender, Nondistended; Bowel sounds present  EXTREMITIES:  no edema BL LE  SKIN: moist    LABS:                        13.5   4.88  )-----------( 316      ( 2021 20:00 )             42.5     07-08    138  |  103  |  24<H>  ----------------------------<  161<H>  4.5   |  24  |  1.19    Ca    8.7      2021 08:40  Phos  1.8     07-  Mg     2.2     07-08    TPro  9.8<H>  /  Alb  4.3  /  TBili  0.3  /  DBili  x   /  AST  26  /  ALT  29  /  AlkPhos  71  07-07      Urinalysis Basic - ( 2021 06:59 )    Color: Yellow / Appearance: Clear / S.025 / pH: x  Gluc: x / Ketone: Moderate  / Bili: Negative / Urobili: Negative mg/dL   Blood: x / Protein: 100 mg/dL / Nitrite: Negative   Leuk Esterase: Negative / RBC: 0-2 /HPF / WBC 0-2   Sq Epi: x / Non Sq Epi: Occasional / Bacteria: Few      Cultures;   CAPILLARY BLOOD GLUCOSE        Lipid panel:           RADIOLOGY & ADDITIONAL TESTS:    Imaging Personally Reviewed:  [x ] YES      Consultant(s) Notes Reviewed:  [x ] YES     Care Discussed with [x ] Consultants [X ] Patient [ ] Family  [x ]    [x ]  Other; RN
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
## 2021-07-08 NOTE — H&P ADULT - NSHPLABSRESULTS_GEN_ALL_CORE
13.5   4.88  )-----------( 316      ( 07 Jul 2021 20:00 )             42.5     07-07    139  |  101  |  20  ----------------------------<  150<H>  3.7   |  15<L>  |  1.57<H>    Ca    9.0      07 Jul 2021 20:00  Phos  3.1     07-07  Mg     2.5     07-07    TPro  9.8<H>  /  Alb  4.3  /  TBili  0.3  /  DBili  x   /  AST  26  /  ALT  29  /  AlkPhos  71  07-07        < from: CT Abdomen and Pelvis No Cont (07.08.21 @ 00:46) >    IMPRESSION:    No radiodense calculus. No hydroureteronephrosis. No acute noncontrast CT findings in the abdomen or pelvis.        --- End of Report ---            STANISLAV ZAVALA MD; Attending Radiologist  This document has been electronically signed. Jul 8 2021  1:08AM    < end of copied text >

## 2021-07-08 NOTE — PROGRESS NOTE ADULT - ASSESSMENT
56 year old man with a past medical history of HTN, gastritis, PUD and ETOH abuse with numerous admissions for ETOH withdrawals. He was admitted to the hospital on 6/4/21 for ETOH withdrawal and gastritis. Transferred to the ICU on 6/6 for ETOH withdrawal with delirium and behavioral disturbances. Patient was placed on precedex drip with improvement in delirium.     ETOH withdrawal delirium, thiamin deficiency, acute metabolic encephalopathy   -ciwa protocol   ativan prn   c/w thiamine, folate, MVI    Chronic alcoholic gastritis without hemorrhage.  Plan: toradol prn, ppi.   HTN- c/w metoprolol    DVT ppx-on lovenox         56 year old man with a past medical history of HTN, gastritis, PUD and ETOH abuse with numerous admissions for ETOH withdrawals. He was admitted to the hospital on 6/4/21 for ETOH withdrawal and gastritis. Transferred to the ICU on 6/6 for ETOH withdrawal with delirium and behavioral disturbances. Patient was placed on precedex drip with improvement in delirium.     ETOH withdrawal delirium, thiamin deficiency, acute metabolic encephalopathy   -ciwa protocol   ativan prn   c/w thiamine, folate, MVI    Hypophosphatemia- suppl. monitor  Lactic acidosis- s/p IVF, trend lactate level  Chronic alcoholic gastritis without hemorrhage.  Plan: toradol prn, ppi.   HTN- c/w metoprolol    DVT ppx-on lovenox

## 2021-07-09 LAB
ANION GAP SERPL CALC-SCNC: 5 MMOL/L — SIGNIFICANT CHANGE UP (ref 5–17)
ANION GAP SERPL CALC-SCNC: 5 MMOL/L — SIGNIFICANT CHANGE UP (ref 5–17)
ANISOCYTOSIS BLD QL: SLIGHT — SIGNIFICANT CHANGE UP
BASOPHILS # BLD AUTO: 0.03 K/UL — SIGNIFICANT CHANGE UP (ref 0–0.2)
BASOPHILS NFR BLD AUTO: 0.9 % — SIGNIFICANT CHANGE UP (ref 0–2)
BUN SERPL-MCNC: 13 MG/DL — SIGNIFICANT CHANGE UP (ref 7–23)
BUN SERPL-MCNC: 14 MG/DL — SIGNIFICANT CHANGE UP (ref 7–23)
CALCIUM SERPL-MCNC: 8.3 MG/DL — LOW (ref 8.5–10.1)
CALCIUM SERPL-MCNC: 8.5 MG/DL — SIGNIFICANT CHANGE UP (ref 8.5–10.1)
CHLORIDE SERPL-SCNC: 104 MMOL/L — SIGNIFICANT CHANGE UP (ref 96–108)
CHLORIDE SERPL-SCNC: 106 MMOL/L — SIGNIFICANT CHANGE UP (ref 96–108)
CO2 SERPL-SCNC: 28 MMOL/L — SIGNIFICANT CHANGE UP (ref 22–31)
CO2 SERPL-SCNC: 32 MMOL/L — HIGH (ref 22–31)
COVID-19 SPIKE DOMAIN AB INTERP: POSITIVE
COVID-19 SPIKE DOMAIN ANTIBODY RESULT: >250 U/ML — HIGH
CREAT SERPL-MCNC: 0.98 MG/DL — SIGNIFICANT CHANGE UP (ref 0.5–1.3)
CREAT SERPL-MCNC: 1.03 MG/DL — SIGNIFICANT CHANGE UP (ref 0.5–1.3)
CULTURE RESULTS: SIGNIFICANT CHANGE UP
ELLIPTOCYTES BLD QL SMEAR: SLIGHT — SIGNIFICANT CHANGE UP
EOSINOPHIL # BLD AUTO: 0.03 K/UL — SIGNIFICANT CHANGE UP (ref 0–0.5)
EOSINOPHIL NFR BLD AUTO: 0.9 % — SIGNIFICANT CHANGE UP (ref 0–6)
GLUCOSE SERPL-MCNC: 109 MG/DL — HIGH (ref 70–99)
GLUCOSE SERPL-MCNC: 119 MG/DL — HIGH (ref 70–99)
HCT VFR BLD CALC: 29.1 % — LOW (ref 39–50)
HCT VFR BLD CALC: 32.5 % — LOW (ref 39–50)
HGB BLD-MCNC: 10.2 G/DL — LOW (ref 13–17)
HGB BLD-MCNC: 9.2 G/DL — LOW (ref 13–17)
HYPOCHROMIA BLD QL: SLIGHT — SIGNIFICANT CHANGE UP
IMM GRANULOCYTES NFR BLD AUTO: 0.6 % — SIGNIFICANT CHANGE UP (ref 0–1.5)
LACTATE SERPL-SCNC: 1.1 MMOL/L — SIGNIFICANT CHANGE UP (ref 0.7–2)
LYMPHOCYTES # BLD AUTO: 1.26 K/UL — SIGNIFICANT CHANGE UP (ref 1–3.3)
LYMPHOCYTES # BLD AUTO: 36.5 % — SIGNIFICANT CHANGE UP (ref 13–44)
MAGNESIUM SERPL-MCNC: 1.9 MG/DL — SIGNIFICANT CHANGE UP (ref 1.6–2.6)
MAGNESIUM SERPL-MCNC: 2.2 MG/DL — SIGNIFICANT CHANGE UP (ref 1.6–2.6)
MANUAL SMEAR VERIFICATION: SIGNIFICANT CHANGE UP
MCHC RBC-ENTMCNC: 23.2 PG — LOW (ref 27–34)
MCHC RBC-ENTMCNC: 23.4 PG — LOW (ref 27–34)
MCHC RBC-ENTMCNC: 31.4 GM/DL — LOW (ref 32–36)
MCHC RBC-ENTMCNC: 31.6 GM/DL — LOW (ref 32–36)
MCV RBC AUTO: 73.9 FL — LOW (ref 80–100)
MCV RBC AUTO: 74 FL — LOW (ref 80–100)
MICROCYTES BLD QL: SLIGHT — SIGNIFICANT CHANGE UP
MONOCYTES # BLD AUTO: 0.35 K/UL — SIGNIFICANT CHANGE UP (ref 0–0.9)
MONOCYTES NFR BLD AUTO: 10.1 % — SIGNIFICANT CHANGE UP (ref 2–14)
NEUTROPHILS # BLD AUTO: 1.76 K/UL — LOW (ref 1.8–7.4)
NEUTROPHILS NFR BLD AUTO: 51 % — SIGNIFICANT CHANGE UP (ref 43–77)
NRBC # BLD: 0 /100 WBCS — SIGNIFICANT CHANGE UP (ref 0–0)
NRBC # BLD: 0 /100 WBCS — SIGNIFICANT CHANGE UP (ref 0–0)
PHOSPHATE SERPL-MCNC: 1.6 MG/DL — LOW (ref 2.5–4.5)
PHOSPHATE SERPL-MCNC: 1.9 MG/DL — LOW (ref 2.5–4.5)
PLAT MORPH BLD: NORMAL — SIGNIFICANT CHANGE UP
PLATELET # BLD AUTO: 163 K/UL — SIGNIFICANT CHANGE UP (ref 150–400)
PLATELET # BLD AUTO: 172 K/UL — SIGNIFICANT CHANGE UP (ref 150–400)
POIKILOCYTOSIS BLD QL AUTO: SLIGHT — SIGNIFICANT CHANGE UP
POTASSIUM SERPL-MCNC: 3.9 MMOL/L — SIGNIFICANT CHANGE UP (ref 3.5–5.3)
POTASSIUM SERPL-MCNC: 3.9 MMOL/L — SIGNIFICANT CHANGE UP (ref 3.5–5.3)
POTASSIUM SERPL-SCNC: 3.9 MMOL/L — SIGNIFICANT CHANGE UP (ref 3.5–5.3)
POTASSIUM SERPL-SCNC: 3.9 MMOL/L — SIGNIFICANT CHANGE UP (ref 3.5–5.3)
RBC # BLD: 3.93 M/UL — LOW (ref 4.2–5.8)
RBC # BLD: 4.4 M/UL — SIGNIFICANT CHANGE UP (ref 4.2–5.8)
RBC # FLD: 17.1 % — HIGH (ref 10.3–14.5)
RBC # FLD: 17.2 % — HIGH (ref 10.3–14.5)
RBC BLD AUTO: ABNORMAL
SARS-COV-2 IGG+IGM SERPL QL IA: >250 U/ML — HIGH
SARS-COV-2 IGG+IGM SERPL QL IA: POSITIVE
SODIUM SERPL-SCNC: 139 MMOL/L — SIGNIFICANT CHANGE UP (ref 135–145)
SODIUM SERPL-SCNC: 141 MMOL/L — SIGNIFICANT CHANGE UP (ref 135–145)
SPECIMEN SOURCE: SIGNIFICANT CHANGE UP
TARGETS BLD QL SMEAR: SLIGHT — SIGNIFICANT CHANGE UP
WBC # BLD: 3.45 K/UL — LOW (ref 3.8–10.5)
WBC # BLD: 5.2 K/UL — SIGNIFICANT CHANGE UP (ref 3.8–10.5)
WBC # FLD AUTO: 3.45 K/UL — LOW (ref 3.8–10.5)
WBC # FLD AUTO: 5.2 K/UL — SIGNIFICANT CHANGE UP (ref 3.8–10.5)

## 2021-07-09 PROCEDURE — 99291 CRITICAL CARE FIRST HOUR: CPT

## 2021-07-09 RX ORDER — SODIUM CHLORIDE 9 MG/ML
500 INJECTION, SOLUTION INTRAVENOUS ONCE
Refills: 0 | Status: COMPLETED | OUTPATIENT
Start: 2021-07-09 | End: 2021-07-09

## 2021-07-09 RX ORDER — PHENOBARBITAL 60 MG
260 TABLET ORAL
Refills: 0 | Status: DISCONTINUED | OUTPATIENT
Start: 2021-07-09 | End: 2021-07-11

## 2021-07-09 RX ORDER — PANTOPRAZOLE SODIUM 20 MG/1
40 TABLET, DELAYED RELEASE ORAL DAILY
Refills: 0 | Status: DISCONTINUED | OUTPATIENT
Start: 2021-07-09 | End: 2021-07-12

## 2021-07-09 RX ORDER — PHENOBARBITAL 60 MG
130 TABLET ORAL ONCE
Refills: 0 | Status: DISCONTINUED | OUTPATIENT
Start: 2021-07-09 | End: 2021-07-09

## 2021-07-09 RX ORDER — FOLIC ACID 0.8 MG
1 TABLET ORAL DAILY
Refills: 0 | Status: DISCONTINUED | OUTPATIENT
Start: 2021-07-09 | End: 2021-07-12

## 2021-07-09 RX ORDER — DEXMEDETOMIDINE HYDROCHLORIDE IN 0.9% SODIUM CHLORIDE 4 UG/ML
0.2 INJECTION INTRAVENOUS
Qty: 200 | Refills: 0 | Status: DISCONTINUED | OUTPATIENT
Start: 2021-07-09 | End: 2021-07-11

## 2021-07-09 RX ORDER — POTASSIUM PHOSPHATE, MONOBASIC POTASSIUM PHOSPHATE, DIBASIC 236; 224 MG/ML; MG/ML
30 INJECTION, SOLUTION INTRAVENOUS ONCE
Refills: 0 | Status: COMPLETED | OUTPATIENT
Start: 2021-07-09 | End: 2021-07-09

## 2021-07-09 RX ORDER — CHLORHEXIDINE GLUCONATE 213 G/1000ML
1 SOLUTION TOPICAL
Refills: 0 | Status: DISCONTINUED | OUTPATIENT
Start: 2021-07-09 | End: 2021-07-12

## 2021-07-09 RX ORDER — ACETAMINOPHEN 500 MG
1000 TABLET ORAL ONCE
Refills: 0 | Status: COMPLETED | OUTPATIENT
Start: 2021-07-09 | End: 2021-07-09

## 2021-07-09 RX ORDER — PHENOBARBITAL 60 MG
130 TABLET ORAL EVERY 4 HOURS
Refills: 0 | Status: DISCONTINUED | OUTPATIENT
Start: 2021-07-09 | End: 2021-07-10

## 2021-07-09 RX ORDER — PHENOBARBITAL 60 MG
130 TABLET ORAL
Refills: 0 | Status: DISCONTINUED | OUTPATIENT
Start: 2021-07-09 | End: 2021-07-11

## 2021-07-09 RX ORDER — POTASSIUM PHOSPHATE, MONOBASIC POTASSIUM PHOSPHATE, DIBASIC 236; 224 MG/ML; MG/ML
15 INJECTION, SOLUTION INTRAVENOUS ONCE
Refills: 0 | Status: COMPLETED | OUTPATIENT
Start: 2021-07-09 | End: 2021-07-09

## 2021-07-09 RX ORDER — THIAMINE MONONITRATE (VIT B1) 100 MG
100 TABLET ORAL DAILY
Refills: 0 | Status: DISCONTINUED | OUTPATIENT
Start: 2021-07-09 | End: 2021-07-12

## 2021-07-09 RX ADMIN — DEXMEDETOMIDINE HYDROCHLORIDE IN 0.9% SODIUM CHLORIDE 3.75 MICROGRAM(S)/KG/HR: 4 INJECTION INTRAVENOUS at 07:31

## 2021-07-09 RX ADMIN — ENOXAPARIN SODIUM 40 MILLIGRAM(S): 100 INJECTION SUBCUTANEOUS at 11:19

## 2021-07-09 RX ADMIN — DEXMEDETOMIDINE HYDROCHLORIDE IN 0.9% SODIUM CHLORIDE 3.75 MICROGRAM(S)/KG/HR: 4 INJECTION INTRAVENOUS at 15:48

## 2021-07-09 RX ADMIN — SODIUM CHLORIDE 500 MILLILITER(S): 9 INJECTION, SOLUTION INTRAVENOUS at 06:59

## 2021-07-09 RX ADMIN — SODIUM CHLORIDE 125 MILLILITER(S): 9 INJECTION, SOLUTION INTRAVENOUS at 18:23

## 2021-07-09 RX ADMIN — SODIUM CHLORIDE 125 MILLILITER(S): 9 INJECTION, SOLUTION INTRAVENOUS at 11:20

## 2021-07-09 RX ADMIN — Medication 130 MILLIGRAM(S): at 04:14

## 2021-07-09 RX ADMIN — POTASSIUM PHOSPHATE, MONOBASIC POTASSIUM PHOSPHATE, DIBASIC 62.5 MILLIMOLE(S): 236; 224 INJECTION, SOLUTION INTRAVENOUS at 10:29

## 2021-07-09 RX ADMIN — DEXMEDETOMIDINE HYDROCHLORIDE IN 0.9% SODIUM CHLORIDE 3.75 MICROGRAM(S)/KG/HR: 4 INJECTION INTRAVENOUS at 18:23

## 2021-07-09 RX ADMIN — Medication 130 MILLIGRAM(S): at 11:58

## 2021-07-09 RX ADMIN — CHLORHEXIDINE GLUCONATE 1 APPLICATION(S): 213 SOLUTION TOPICAL at 08:41

## 2021-07-09 RX ADMIN — Medication 2 MILLIGRAM(S): at 00:36

## 2021-07-09 RX ADMIN — Medication 130 MILLIGRAM(S): at 03:53

## 2021-07-09 RX ADMIN — Medication 1000 MILLIGRAM(S): at 16:15

## 2021-07-09 RX ADMIN — PANTOPRAZOLE SODIUM 40 MILLIGRAM(S): 20 TABLET, DELAYED RELEASE ORAL at 11:19

## 2021-07-09 RX ADMIN — Medication 130 MILLIGRAM(S): at 08:41

## 2021-07-09 RX ADMIN — DEXMEDETOMIDINE HYDROCHLORIDE IN 0.9% SODIUM CHLORIDE 3.75 MICROGRAM(S)/KG/HR: 4 INJECTION INTRAVENOUS at 11:19

## 2021-07-09 RX ADMIN — Medication 130 MILLIGRAM(S): at 20:21

## 2021-07-09 RX ADMIN — Medication 130 MILLIGRAM(S): at 04:25

## 2021-07-09 RX ADMIN — Medication 100 MILLIGRAM(S): at 11:19

## 2021-07-09 RX ADMIN — MORPHINE SULFATE 2 MILLIGRAM(S): 50 CAPSULE, EXTENDED RELEASE ORAL at 00:23

## 2021-07-09 RX ADMIN — Medication 2 MILLIGRAM(S): at 03:23

## 2021-07-09 RX ADMIN — Medication 1 MILLIGRAM(S): at 11:19

## 2021-07-09 RX ADMIN — DEXMEDETOMIDINE HYDROCHLORIDE IN 0.9% SODIUM CHLORIDE 3.75 MICROGRAM(S)/KG/HR: 4 INJECTION INTRAVENOUS at 21:23

## 2021-07-09 RX ADMIN — POTASSIUM PHOSPHATE, MONOBASIC POTASSIUM PHOSPHATE, DIBASIC 83.33 MILLIMOLE(S): 236; 224 INJECTION, SOLUTION INTRAVENOUS at 20:20

## 2021-07-09 RX ADMIN — Medication 2 MILLIGRAM(S): at 01:27

## 2021-07-09 RX ADMIN — DEXMEDETOMIDINE HYDROCHLORIDE IN 0.9% SODIUM CHLORIDE 3.75 MICROGRAM(S)/KG/HR: 4 INJECTION INTRAVENOUS at 04:50

## 2021-07-09 RX ADMIN — Medication 2 MILLIGRAM(S): at 02:43

## 2021-07-09 RX ADMIN — Medication 2 MILLIGRAM(S): at 02:21

## 2021-07-09 RX ADMIN — Medication 400 MILLIGRAM(S): at 15:48

## 2021-07-09 RX ADMIN — Medication 130 MILLIGRAM(S): at 15:35

## 2021-07-09 NOTE — CONSULT NOTE ADULT - SUBJECTIVE AND OBJECTIVE BOX
HPI:  56M PMHx of HTN, gastritis/PUD, ETOH abuse (prior seizures/admissions/ICU w/ DTs), multiple ED visits for ETOH intoxication presenting with ETOH intoxication and b/l hand pain(x2days). Patient is a poor historian, also intoxicated. Reporting chronic abdominal pain associated w/ nausea/vomiting. Admitted to drinking 1 bottle of vodka today. (08 Jul 2021 06:12)    Pt actively in ETOH withdrawal s/p multiple ativan IVP and phenobarb 130x1, 260x1. ICU consulted for ETOH management for elevated CIWA, increased seizure risk    Vital Signs Last 24 Hrs  T(C): 36.4 (08 Jul 2021 23:44), Max: 37.4 (08 Jul 2021 08:06)  T(F): 97.6 (08 Jul 2021 23:44), Max: 99.4 (08 Jul 2021 14:08)  HR: 105 (09 Jul 2021 04:12) (65 - 121)  BP: 143/94 (09 Jul 2021 04:12) (118/68 - 151/95)  BP(mean): --  RR: 17 (09 Jul 2021 03:20) (13 - 18)  SpO2: 96% (09 Jul 2021 03:20) (93% - 96%)    PHYSICAL EXAM:  GENERAL: delirious   HEAD:  NC/AC  EYES: EOMI, PERRLA, conjunctiva and sclera clear  NECK: Supple, No JVD  CHEST/LUNG: CTAB. No cough, wheeze, rales.   HEART: Regular rate and rhythm. No murmurs, rubs, or gallops.   ABDOMEN: Soft, Nontender, Nondistended. Bowel sounds present  EXTREMITIES:  2+ Peripheral Pulses, No clubbing, cyanosis, or edema  MS: AAOxO  NEUROLOGY: non-focal  SKIN: No rashes or lesions
REASON FOR CONSULT: Alcohol withdrawal  Chief Complaint    HPI: 56M PMHx of HTN, gastritis/PUD, ETOH abuse (prior seizures/admissions/ICU w/ DTs), multiple ED visits for ETOH intoxication presenting with ETOH intoxication and b/l hand pain(x2days). Patient is a poor historian, also intoxicated. Reporting chronic abdominal pain associated w/ nausea/vomiting. Admitted to drinking 1 bottle of vodka today. (2021 06:12)    Pt actively in ETOH withdrawal s/p multiple ativan IVP and phenobarb 130x1, 260x1. ICU consulted for ETOH management for elevated CIWA, increased seizure risk  Case discussed with the ICU PA.    PAST MEDICAL & SURGICAL HISTORY:  PUD (peptic ulcer disease)  Mixed hyperlipidemia  EtOH dependence  Gastritis  Substance abuse  DTs (delirium tremens)    No significant past surgical history      Allergies  No Known Allergies      Medications:  metoprolol succinate ER 25 milliGRAM(s) Oral daily  dexMEDEtomidine Infusion 0.2 MICROgram(s)/kG/Hr IV Continuous <Continuous>  PHENobarbital Injectable 130 milliGRAM(s) IV Push every 20 minutes PRN  PHENobarbital Injectable 130 milliGRAM(s) IV Push every 4 hours  PHENobarbital Injectable 260 milliGRAM(s) IV Push every 1 hour PRN  enoxaparin Injectable 40 milliGRAM(s) SubCutaneous daily  pantoprazole  Injectable 40 milliGRAM(s) IV Push daily  folic acid Injectable 1 milliGRAM(s) IV Push daily  lactated ringers. 1000 milliLiter(s) IV Continuous <Continuous>  thiamine Injectable 100 milliGRAM(s) IV Push daily  chlorhexidine 2% Cloths 1 Application(s) Topical <User Schedule>          Vital Signs Last 24 Hrs  T(C): 36.4 (2021 23:44), Max: 37.4 (2021 08:06)  T(F): 97.6 (2021 23:44), Max: 99.4 (2021 14:08)  HR: 71 (2021 05:13) (65 - 121)  BP: 112/76 (2021 05:13) (112/76 - 151/95)  BP(mean): 84 (2021 05:13) (84 - 84)  RR: 17 (2021 05:13) (13 - 18)  SpO2: 96% (2021 05:13) (93% - 96%)    ABG - ( 2021 20:36 )  pH, Arterial: x     pH, Blood: 7.33  /  pCO2: 28    /  pO2: 105   / HCO3: 14    / Base Excess: -9.9  /  SaO2: 96            2021 07:01  -  2021 05:54  --------------------------------------------------------  IN:    Oral Fluid: 720 mL  Total IN: 720 mL    OUT:  Total OUT: 0 mL    Total NET: 720 mL        LABS:                        13.5   4.88  )-----------( 316      ( 2021 20:00 )             42.5     07-08    138  |  103  |  24<H>  ----------------------------<  161<H>  4.5   |  24  |  1.19    Ca    8.7      2021 08:40  Phos  1.8     07-08  Mg     2.2     07-08    TPro  9.8<H>  /  Alb  4.3  /  TBili  0.3  /  DBili  x   /  AST  26  /  ALT  29  /  AlkPhos  71  07-07          Urinalysis Basic - ( 2021 06:59 )    Color: Yellow / Appearance: Clear / S.025 / pH: x  Gluc: x / Ketone: Moderate  / Bili: Negative / Urobili: Negative mg/dL   Blood: x / Protein: 100 mg/dL / Nitrite: Negative   Leuk Esterase: Negative / RBC: 0-2 /HPF / WBC 0-2   Sq Epi: x / Non Sq Epi: Occasional / Bacteria: Few      Physical Examination:  Physical exam as per bedside MD (direct physical examination could not be performed because the visit was provided via Telemedicine):       IMP-  Alcohol withdrawal  encephalopathy    Plan-  Admit to ICU  Precedex  Phenobarbital  Hydration  monitor lytes    CRITICAL CARE TIME SPENT: 45    This visit was provided via telemedicine. Patient was located at     [facility]    Provider was located at [employer] [place] for the visit.

## 2021-07-09 NOTE — CONSULT NOTE ADULT - ASSESSMENT
56M PMHx of HTN, gastritis/PUD, ETOH abuse (prior seizures/admissions/ICU w/ DTs), multiple ED visits for ETOH intoxication presenting with ETOH intoxication and b/l hand pain(x2days), now transferred to ICU for active ETOH withdrawal, elevated CIWA w/ hallucinations     Neuro-  active ETOH withdrawal  -ciwa protocol  -folate/thiamine  - Phenobarb 130 Q4 and symptom triggered phenobarb dosing   - precedex gtt, wean as tolerated    ETOH Ketoacidosis  -s/p 3L of LR/thiamine  -c/w ivf, trend lactate, vbg, bhydroxybut    Renal -   ROSA  -c/w IVF, improving    Ortho-  B/L hand pain  -exam benign, xrays done in ed  -etoh neuropathy?  -c/w pain meds as needed    Transfer to ICU     56M PMHx of HTN, gastritis/PUD, ETOH abuse (prior seizures/admissions/ICU w/ DTs), multiple ED visits for ETOH intoxication presenting with ETOH intoxication and b/l hand pain(x2days), now transferred to ICU for active ETOH withdrawal, elevated CIWA w/ hallucinations     Neuro-  active ETOH withdrawal  -ciwa protocol  -folate/thiamine  - Phenobarb 130 Q4 and symptom triggered phenobarb dosing   - precedex gtt, wean as tolerated    ETOH Ketoacidosis  -s/p 3L of LR/thiamine  -c/w ivf, trend lactate, vbg, bhydroxybut    Renal -   ROSA  -c/w IVF, improving    Ortho-  B/L hand pain  -exam benign, xrays done in ed  -etoh neuropathy?  -c/w pain meds as needed    Transfer to ICU  D/w EICU DR Jiménez

## 2021-07-10 LAB
ALBUMIN SERPL ELPH-MCNC: 3 G/DL — LOW (ref 3.3–5)
ALBUMIN SERPL ELPH-MCNC: 3 G/DL — LOW (ref 3.3–5)
ALP SERPL-CCNC: 47 U/L — SIGNIFICANT CHANGE UP (ref 40–120)
ALP SERPL-CCNC: 48 U/L — SIGNIFICANT CHANGE UP (ref 40–120)
ALT FLD-CCNC: 14 U/L — SIGNIFICANT CHANGE UP (ref 12–78)
ALT FLD-CCNC: 22 U/L — SIGNIFICANT CHANGE UP (ref 12–78)
ANION GAP SERPL CALC-SCNC: 6 MMOL/L — SIGNIFICANT CHANGE UP (ref 5–17)
ANION GAP SERPL CALC-SCNC: 8 MMOL/L — SIGNIFICANT CHANGE UP (ref 5–17)
AST SERPL-CCNC: 20 U/L — SIGNIFICANT CHANGE UP (ref 15–37)
AST SERPL-CCNC: 22 U/L — SIGNIFICANT CHANGE UP (ref 15–37)
BILIRUB DIRECT SERPL-MCNC: 0.24 MG/DL — HIGH (ref 0.05–0.2)
BILIRUB INDIRECT FLD-MCNC: 0.6 MG/DL — SIGNIFICANT CHANGE UP (ref 0.2–1)
BILIRUB SERPL-MCNC: 0.8 MG/DL — SIGNIFICANT CHANGE UP (ref 0.2–1.2)
BILIRUB SERPL-MCNC: 1 MG/DL — SIGNIFICANT CHANGE UP (ref 0.2–1.2)
BUN SERPL-MCNC: 11 MG/DL — SIGNIFICANT CHANGE UP (ref 7–23)
BUN SERPL-MCNC: 9 MG/DL — SIGNIFICANT CHANGE UP (ref 7–23)
CALCIUM SERPL-MCNC: 8.5 MG/DL — SIGNIFICANT CHANGE UP (ref 8.5–10.1)
CALCIUM SERPL-MCNC: 8.6 MG/DL — SIGNIFICANT CHANGE UP (ref 8.5–10.1)
CHLORIDE SERPL-SCNC: 105 MMOL/L — SIGNIFICANT CHANGE UP (ref 96–108)
CHLORIDE SERPL-SCNC: 106 MMOL/L — SIGNIFICANT CHANGE UP (ref 96–108)
CO2 SERPL-SCNC: 27 MMOL/L — SIGNIFICANT CHANGE UP (ref 22–31)
CO2 SERPL-SCNC: 28 MMOL/L — SIGNIFICANT CHANGE UP (ref 22–31)
CREAT SERPL-MCNC: 0.85 MG/DL — SIGNIFICANT CHANGE UP (ref 0.5–1.3)
CREAT SERPL-MCNC: 0.89 MG/DL — SIGNIFICANT CHANGE UP (ref 0.5–1.3)
GLUCOSE SERPL-MCNC: 100 MG/DL — HIGH (ref 70–99)
GLUCOSE SERPL-MCNC: 113 MG/DL — HIGH (ref 70–99)
HCT VFR BLD CALC: 33.2 % — LOW (ref 39–50)
HGB BLD-MCNC: 10.4 G/DL — LOW (ref 13–17)
MAGNESIUM SERPL-MCNC: 2.1 MG/DL — SIGNIFICANT CHANGE UP (ref 1.6–2.6)
MAGNESIUM SERPL-MCNC: 2.1 MG/DL — SIGNIFICANT CHANGE UP (ref 1.6–2.6)
MCHC RBC-ENTMCNC: 23.3 PG — LOW (ref 27–34)
MCHC RBC-ENTMCNC: 31.3 GM/DL — LOW (ref 32–36)
MCV RBC AUTO: 74.4 FL — LOW (ref 80–100)
NRBC # BLD: 0 /100 WBCS — SIGNIFICANT CHANGE UP (ref 0–0)
PHOSPHATE SERPL-MCNC: 2.3 MG/DL — LOW (ref 2.5–4.5)
PHOSPHATE SERPL-MCNC: 2.8 MG/DL — SIGNIFICANT CHANGE UP (ref 2.5–4.5)
PLATELET # BLD AUTO: 169 K/UL — SIGNIFICANT CHANGE UP (ref 150–400)
POTASSIUM SERPL-MCNC: 3.3 MMOL/L — LOW (ref 3.5–5.3)
POTASSIUM SERPL-MCNC: 3.6 MMOL/L — SIGNIFICANT CHANGE UP (ref 3.5–5.3)
POTASSIUM SERPL-SCNC: 3.3 MMOL/L — LOW (ref 3.5–5.3)
POTASSIUM SERPL-SCNC: 3.6 MMOL/L — SIGNIFICANT CHANGE UP (ref 3.5–5.3)
PROT SERPL-MCNC: 6.8 GM/DL — SIGNIFICANT CHANGE UP (ref 6–8.3)
PROT SERPL-MCNC: 7.3 GM/DL — SIGNIFICANT CHANGE UP (ref 6–8.3)
RBC # BLD: 4.46 M/UL — SIGNIFICANT CHANGE UP (ref 4.2–5.8)
RBC # FLD: 17.2 % — HIGH (ref 10.3–14.5)
SODIUM SERPL-SCNC: 139 MMOL/L — SIGNIFICANT CHANGE UP (ref 135–145)
SODIUM SERPL-SCNC: 141 MMOL/L — SIGNIFICANT CHANGE UP (ref 135–145)
WBC # BLD: 9.12 K/UL — SIGNIFICANT CHANGE UP (ref 3.8–10.5)
WBC # FLD AUTO: 9.12 K/UL — SIGNIFICANT CHANGE UP (ref 3.8–10.5)

## 2021-07-10 PROCEDURE — 71045 X-RAY EXAM CHEST 1 VIEW: CPT | Mod: 26

## 2021-07-10 PROCEDURE — 99291 CRITICAL CARE FIRST HOUR: CPT

## 2021-07-10 RX ORDER — ACETAMINOPHEN 500 MG
650 TABLET ORAL EVERY 6 HOURS
Refills: 0 | Status: DISCONTINUED | OUTPATIENT
Start: 2021-07-10 | End: 2021-07-11

## 2021-07-10 RX ORDER — POTASSIUM CHLORIDE 20 MEQ
10 PACKET (EA) ORAL
Refills: 0 | Status: COMPLETED | OUTPATIENT
Start: 2021-07-10 | End: 2021-07-10

## 2021-07-10 RX ORDER — SODIUM,POTASSIUM PHOSPHATES 278-250MG
1 POWDER IN PACKET (EA) ORAL EVERY 6 HOURS
Refills: 0 | Status: COMPLETED | OUTPATIENT
Start: 2021-07-10 | End: 2021-07-10

## 2021-07-10 RX ADMIN — ENOXAPARIN SODIUM 40 MILLIGRAM(S): 100 INJECTION SUBCUTANEOUS at 12:20

## 2021-07-10 RX ADMIN — Medication 650 MILLIGRAM(S): at 12:45

## 2021-07-10 RX ADMIN — SODIUM CHLORIDE 125 MILLILITER(S): 9 INJECTION, SOLUTION INTRAVENOUS at 04:54

## 2021-07-10 RX ADMIN — CHLORHEXIDINE GLUCONATE 1 APPLICATION(S): 213 SOLUTION TOPICAL at 12:21

## 2021-07-10 RX ADMIN — Medication 1 TABLET(S): at 17:34

## 2021-07-10 RX ADMIN — Medication 100 MILLIEQUIVALENT(S): at 16:50

## 2021-07-10 RX ADMIN — DEXMEDETOMIDINE HYDROCHLORIDE IN 0.9% SODIUM CHLORIDE 3.75 MICROGRAM(S)/KG/HR: 4 INJECTION INTRAVENOUS at 21:11

## 2021-07-10 RX ADMIN — Medication 100 MILLIGRAM(S): at 21:01

## 2021-07-10 RX ADMIN — PANTOPRAZOLE SODIUM 40 MILLIGRAM(S): 20 TABLET, DELAYED RELEASE ORAL at 12:20

## 2021-07-10 RX ADMIN — Medication 650 MILLIGRAM(S): at 12:20

## 2021-07-10 RX ADMIN — DEXMEDETOMIDINE HYDROCHLORIDE IN 0.9% SODIUM CHLORIDE 3.75 MICROGRAM(S)/KG/HR: 4 INJECTION INTRAVENOUS at 12:20

## 2021-07-10 RX ADMIN — Medication 100 MILLIGRAM(S): at 12:20

## 2021-07-10 RX ADMIN — DEXMEDETOMIDINE HYDROCHLORIDE IN 0.9% SODIUM CHLORIDE 3.75 MICROGRAM(S)/KG/HR: 4 INJECTION INTRAVENOUS at 01:45

## 2021-07-10 RX ADMIN — SODIUM CHLORIDE 125 MILLILITER(S): 9 INJECTION, SOLUTION INTRAVENOUS at 21:00

## 2021-07-10 RX ADMIN — Medication 130 MILLIGRAM(S): at 03:14

## 2021-07-10 RX ADMIN — Medication 100 MILLIEQUIVALENT(S): at 17:48

## 2021-07-10 RX ADMIN — Medication 1 MILLIGRAM(S): at 13:35

## 2021-07-10 RX ADMIN — Medication 100 MILLIEQUIVALENT(S): at 18:53

## 2021-07-10 RX ADMIN — Medication 130 MILLIGRAM(S): at 00:30

## 2021-07-10 RX ADMIN — Medication 100 MILLIGRAM(S): at 13:34

## 2021-07-10 RX ADMIN — Medication 130 MILLIGRAM(S): at 08:19

## 2021-07-10 RX ADMIN — Medication 1 TABLET(S): at 23:14

## 2021-07-10 RX ADMIN — DEXMEDETOMIDINE HYDROCHLORIDE IN 0.9% SODIUM CHLORIDE 3.75 MICROGRAM(S)/KG/HR: 4 INJECTION INTRAVENOUS at 06:31

## 2021-07-10 RX ADMIN — SODIUM CHLORIDE 125 MILLILITER(S): 9 INJECTION, SOLUTION INTRAVENOUS at 11:38

## 2021-07-10 NOTE — PROGRESS NOTE ADULT - SUBJECTIVE AND OBJECTIVE BOX
24 hr events:  weaning down precedex drip  more awake today but remains confused  able to take po intake today  did not require any extra prn doses of phenobarbital overnight  febrile 102.9 yesterday afternoon      ## ROS:  [x ] unable to obtain due to pt's acute metabolic encephalopathy, delirium      ## Labs:  CBC:                        10.4   9.12  )-----------( 169      ( 10 Jul 2021 03:38 )             33.2     Chem:  07-10    141  |  106  |  9   ----------------------------<  113<H>  3.3<L>   |  27  |  0.85    Ca    8.6      10 Jul 2021 13:38  Phos  2.3     07-10  Mg     2.1     07-10    TPro  6.8  /  Alb  3.0<L>  /  TBili  0.8  /  DBili  .24<H>  /  AST  20  /  ALT  22  /  AlkPhos  48  07-10      Culture - Urine (07.08.21 @ 09:10)    Specimen Source: .Urine Clean Catch (Midstream)    Culture Results: <10,000 CFU/mL Normal Urogenital Melanie      ## Imaging:  CXR < from: Xray Chest 1 View- PORTABLE-Urgent (Xray Chest 1 View- PORTABLE-Urgent .) (07.10.21 @ 12:49) >  No active parenchymal disease in the chest.      ## Medications:  enoxaparin Injectable 40 milliGRAM(s) SubCutaneous daily    pantoprazole  Injectable 40 milliGRAM(s) IV Push daily    acetaminophen   Tablet .. 650 milliGRAM(s) Oral every 6 hours PRN  chlordiazePOXIDE   Oral   chlordiazePOXIDE 100 milliGRAM(s) Oral every 8 hours  dexMEDEtomidine Infusion 0.2 MICROgram(s)/kG/Hr IV Continuous <Continuous>  PHENobarbital Injectable 260 milliGRAM(s) IV Push every 1 hour PRN  PHENobarbital Injectable 130 milliGRAM(s) IV Push every 20 minutes PRN      ## Vitals:  T(C): 36.7 (07-10-21 @ 19:15), Max: 37.4 (07-10-21 @ 15:23)  HR: 81 (07-10-21 @ 19:15) (54 - 105)  BP: 121/71 (07-10-21 @ 19:00) (97/69 - 157/81)  BP(mean): 84 (07-10-21 @ 19:00) (73 - 109)  RR: 16 (07-10-21 @ 19:15) (14 - 25)  SpO2: 100% (07-10-21 @ 19:15) (95% - 100%)      07-09 @ 07:01  -  07-10 @ 07:00  --------------------------------------------------------  IN: 4124.9 mL / OUT: 3300 mL / NET: 824.9 mL    07-10 @ 07:01  -  07-10 @ 19:53  --------------------------------------------------------  IN: 2018.3 mL / OUT: 1050 mL / NET: 968.3 mL          ## P/E:  Gen: lying comfortably in bed in no apparent distress  HEENT: EOMI, sclera white  Resp: CTA B/L , no rhonchi, no wheeze  CVS: RRR  Abd: soft NT/ND +BS  Ext: no c/c/e  Neuro: confused and disoriented, moves all extremities equally     CENTRAL LINE: [ ] YES [ x] NO  LOCATION:   DATE INSERTED:  REMOVE: [ ] YES [ ] NO      CHAN: [ ] YES [x ] NO    DATE INSERTED:  REMOVE:  [ ] YES [ ] NO      A-LINE:  [ ] YES [x ] NO  LOCATION:   DATE INSERTED:  REMOVE:  [ ] YES [ ] NO  EXPLAIN:    GLOBAL ISSUE/BEST PRACTICE:  Analgesia: n/a  Sedation: precedex  HOB elevation: yes  Stress ulcer prophylaxis: protonix  VTE prophylaxis: lovenox  Oral Care: n/a  Glycemic control: n/a  Nutrition: po diet when tolerates    CODE STATUS: [x ] full code  [ ] DNR  [ ] DNI  [ ] MOLST  Goals of care discussion: [x ] yes

## 2021-07-10 NOTE — PROGRESS NOTE ADULT - ASSESSMENT
56M PMH chronic ETOH abuse and dependence (1 bottle of Vodka a day) with long standing hx of alcohol withdrawal and DTs with frequent hospital/ICU admissions, alcoholic gastritis/esophagitis, PUD, HLD, hx of hypoxic respiratory failure requiring intubation due to aspiration PNA (most recent intubation Aril 2020),  staph PNA, COVID-19 infection Dec 2020 - did not require intubation presents to ED 7/7 by ambulance for alcohol intoxication , tachycardic, tachypneic, lactate 14.3, Cr 1.57, c/o bilateral hand pain and abdominal pain. Last drink was day of ED presentation. Admitted to medical floor 7/8. 7/9 with agitation CIWA 18 s/p multiple pushes of ativan, phenobarbital. Transferred to ICU for severe DTs requiring sedation protocol    Dx: ETOH withdrawal with DTs, dehydration, lactic acidosis-resolved with IVF, ROSA    - on precedex and standing phenobarbital  - weaning down precedex drip  - more awake today, able to tolerate PO  - will d/c phenobarbital and change to standing librium 100mg q8 hours with librium taper  - will cont with prn phenobarbital for breakthrough agitation  - currently calm and confused  - cont IVF hydration  - MVI/thiamine/folic acid  - Cr normalized, good urine output  - potonix for alcohol gastritis  - lactate cleared  - monitor and supplement electrolytes : hypokalemia  - febrile yesterday: CXR clear, check procalcitonin, if respikes temp will check blood cx  - fevers can be withdrawal fevers, monitor off antibx   - DVT prophylaxis with lovenox  - full code  - PT eval    Critical Care Time: 32 min .

## 2021-07-11 LAB
ALBUMIN SERPL ELPH-MCNC: 2.7 G/DL — LOW (ref 3.3–5)
ALP SERPL-CCNC: 51 U/L — SIGNIFICANT CHANGE UP (ref 40–120)
ALT FLD-CCNC: 22 U/L — SIGNIFICANT CHANGE UP (ref 12–78)
ANION GAP SERPL CALC-SCNC: 8 MMOL/L — SIGNIFICANT CHANGE UP (ref 5–17)
AST SERPL-CCNC: 11 U/L — LOW (ref 15–37)
BASOPHILS # BLD AUTO: 0.04 K/UL — SIGNIFICANT CHANGE UP (ref 0–0.2)
BASOPHILS NFR BLD AUTO: 0.6 % — SIGNIFICANT CHANGE UP (ref 0–2)
BILIRUB SERPL-MCNC: 0.5 MG/DL — SIGNIFICANT CHANGE UP (ref 0.2–1.2)
BUN SERPL-MCNC: 8 MG/DL — SIGNIFICANT CHANGE UP (ref 7–23)
CALCIUM SERPL-MCNC: 8.4 MG/DL — LOW (ref 8.5–10.1)
CHLORIDE SERPL-SCNC: 107 MMOL/L — SIGNIFICANT CHANGE UP (ref 96–108)
CO2 SERPL-SCNC: 26 MMOL/L — SIGNIFICANT CHANGE UP (ref 22–31)
CREAT SERPL-MCNC: 0.79 MG/DL — SIGNIFICANT CHANGE UP (ref 0.5–1.3)
EOSINOPHIL # BLD AUTO: 0.23 K/UL — SIGNIFICANT CHANGE UP (ref 0–0.5)
EOSINOPHIL NFR BLD AUTO: 3.4 % — SIGNIFICANT CHANGE UP (ref 0–6)
GLUCOSE SERPL-MCNC: 89 MG/DL — SIGNIFICANT CHANGE UP (ref 70–99)
HCT VFR BLD CALC: 31.6 % — LOW (ref 39–50)
HGB BLD-MCNC: 9.8 G/DL — LOW (ref 13–17)
IMM GRANULOCYTES NFR BLD AUTO: 0.3 % — SIGNIFICANT CHANGE UP (ref 0–1.5)
LYMPHOCYTES # BLD AUTO: 1.43 K/UL — SIGNIFICANT CHANGE UP (ref 1–3.3)
LYMPHOCYTES # BLD AUTO: 21.4 % — SIGNIFICANT CHANGE UP (ref 13–44)
MAGNESIUM SERPL-MCNC: 2 MG/DL — SIGNIFICANT CHANGE UP (ref 1.6–2.6)
MCHC RBC-ENTMCNC: 23.2 PG — LOW (ref 27–34)
MCHC RBC-ENTMCNC: 31 GM/DL — LOW (ref 32–36)
MCV RBC AUTO: 74.9 FL — LOW (ref 80–100)
MONOCYTES # BLD AUTO: 0.3 K/UL — SIGNIFICANT CHANGE UP (ref 0–0.9)
MONOCYTES NFR BLD AUTO: 4.5 % — SIGNIFICANT CHANGE UP (ref 2–14)
NEUTROPHILS # BLD AUTO: 4.65 K/UL — SIGNIFICANT CHANGE UP (ref 1.8–7.4)
NEUTROPHILS NFR BLD AUTO: 69.8 % — SIGNIFICANT CHANGE UP (ref 43–77)
NRBC # BLD: 0 /100 WBCS — SIGNIFICANT CHANGE UP (ref 0–0)
PHOSPHATE SERPL-MCNC: 3.6 MG/DL — SIGNIFICANT CHANGE UP (ref 2.5–4.5)
PLATELET # BLD AUTO: 173 K/UL — SIGNIFICANT CHANGE UP (ref 150–400)
POTASSIUM SERPL-MCNC: 3.6 MMOL/L — SIGNIFICANT CHANGE UP (ref 3.5–5.3)
POTASSIUM SERPL-SCNC: 3.6 MMOL/L — SIGNIFICANT CHANGE UP (ref 3.5–5.3)
PROCALCITONIN SERPL-MCNC: 0.22 NG/ML — HIGH (ref 0.02–0.1)
PROT SERPL-MCNC: 6.9 GM/DL — SIGNIFICANT CHANGE UP (ref 6–8.3)
RBC # BLD: 4.22 M/UL — SIGNIFICANT CHANGE UP (ref 4.2–5.8)
RBC # FLD: 17.2 % — HIGH (ref 10.3–14.5)
SODIUM SERPL-SCNC: 141 MMOL/L — SIGNIFICANT CHANGE UP (ref 135–145)
WBC # BLD: 6.67 K/UL — SIGNIFICANT CHANGE UP (ref 3.8–10.5)
WBC # FLD AUTO: 6.67 K/UL — SIGNIFICANT CHANGE UP (ref 3.8–10.5)

## 2021-07-11 PROCEDURE — 99233 SBSQ HOSP IP/OBS HIGH 50: CPT

## 2021-07-11 RX ORDER — POTASSIUM CHLORIDE 20 MEQ
10 PACKET (EA) ORAL
Refills: 0 | Status: COMPLETED | OUTPATIENT
Start: 2021-07-11 | End: 2021-07-11

## 2021-07-11 RX ORDER — DIPHENHYDRAMINE HCL 50 MG
50 CAPSULE ORAL ONCE
Refills: 0 | Status: COMPLETED | OUTPATIENT
Start: 2021-07-11 | End: 2021-07-11

## 2021-07-11 RX ORDER — LANOLIN ALCOHOL/MO/W.PET/CERES
3 CREAM (GRAM) TOPICAL ONCE
Refills: 0 | Status: COMPLETED | OUTPATIENT
Start: 2021-07-11 | End: 2021-07-11

## 2021-07-11 RX ORDER — LIDOCAINE 4 G/100G
1 CREAM TOPICAL ONCE
Refills: 0 | Status: COMPLETED | OUTPATIENT
Start: 2021-07-11 | End: 2021-07-11

## 2021-07-11 RX ORDER — KETOROLAC TROMETHAMINE 30 MG/ML
15 SYRINGE (ML) INJECTION ONCE
Refills: 0 | Status: DISCONTINUED | OUTPATIENT
Start: 2021-07-11 | End: 2021-07-11

## 2021-07-11 RX ADMIN — PANTOPRAZOLE SODIUM 40 MILLIGRAM(S): 20 TABLET, DELAYED RELEASE ORAL at 12:34

## 2021-07-11 RX ADMIN — Medication 100 MILLIEQUIVALENT(S): at 07:09

## 2021-07-11 RX ADMIN — LIDOCAINE 1 PATCH: 4 CREAM TOPICAL at 21:31

## 2021-07-11 RX ADMIN — Medication 15 MILLIGRAM(S): at 20:00

## 2021-07-11 RX ADMIN — Medication 50 MILLIGRAM(S): at 23:45

## 2021-07-11 RX ADMIN — SODIUM CHLORIDE 125 MILLILITER(S): 9 INJECTION, SOLUTION INTRAVENOUS at 05:03

## 2021-07-11 RX ADMIN — Medication 3 MILLIGRAM(S): at 21:31

## 2021-07-11 RX ADMIN — Medication 650 MILLIGRAM(S): at 15:50

## 2021-07-11 RX ADMIN — CHLORHEXIDINE GLUCONATE 1 APPLICATION(S): 213 SOLUTION TOPICAL at 05:02

## 2021-07-11 RX ADMIN — Medication 100 MILLIGRAM(S): at 21:31

## 2021-07-11 RX ADMIN — Medication 100 MILLIEQUIVALENT(S): at 05:02

## 2021-07-11 RX ADMIN — Medication 100 MILLIGRAM(S): at 12:34

## 2021-07-11 RX ADMIN — Medication 1 MILLIGRAM(S): at 12:35

## 2021-07-11 RX ADMIN — Medication 100 MILLIGRAM(S): at 05:03

## 2021-07-11 RX ADMIN — Medication 100 MILLIGRAM(S): at 13:47

## 2021-07-11 RX ADMIN — Medication 3 MILLIGRAM(S): at 23:31

## 2021-07-11 RX ADMIN — Medication 15 MILLIGRAM(S): at 19:13

## 2021-07-11 RX ADMIN — Medication 100 MILLIEQUIVALENT(S): at 06:26

## 2021-07-11 RX ADMIN — Medication 650 MILLIGRAM(S): at 14:45

## 2021-07-11 RX ADMIN — ENOXAPARIN SODIUM 40 MILLIGRAM(S): 100 INJECTION SUBCUTANEOUS at 12:34

## 2021-07-11 NOTE — PROGRESS NOTE ADULT - SUBJECTIVE AND OBJECTIVE BOX
24 hr events:  weaned off precedex drip this morning  awake, alert, responsive  calm and cooperative  ambulated with staff within the unit  has not required any prn doses of meds for agitation    ## ROS:  "i am better, i feel ok, i can go home now"  no fever, no chills  no HA, no dizziness  no visual disturbances or changes  no sob, no cough  no cp  no abdominal pain, no nausea, no emesis  + back pain (chronic per pt)  no radiculopathy, no leg pain  wants "power injection to make me stronger"      ## Labs:  CBC:                        9.8    6.67  )-----------( 173      ( 11 Jul 2021 03:22 )             31.6     Chem:  07-11    141  |  107  |  8   ----------------------------<  89  3.6   |  26  |  0.79    Ca    8.4<L>      11 Jul 2021 03:22  Phos  3.6     07-11  Mg     2.0     07-11    TPro  6.9  /  Alb  2.7<L>  /  TBili  0.5  /  AST  11<L>  /  ALT  22  /  AlkPhos  51  07-11    Culture - Urine (07.08.21 @ 09:10)    Specimen Source: .Urine Clean Catch (Midstream)    Culture Results: <10,000 CFU/mL Normal Urogenital Melanie      ## Medications:  enoxaparin Injectable 40 milliGRAM(s) SubCutaneous daily    pantoprazole  Injectable 40 milliGRAM(s) IV Push daily    chlordiazePOXIDE   Oral       ## Vitals:  T(C): 37.3 (07-11-21 @ 20:00), Max: 37.3 (07-11-21 @ 05:00)  HR: 63 (07-11-21 @ 22:00) (57 - 115)  BP: 120/71 (07-11-21 @ 21:00) (111/56 - 153/96)  BP(mean): 83 (07-11-21 @ 21:00) (67 - 110)  RR: 14 (07-11-21 @ 22:00) (14 - 25)  SpO2: 100% (07-11-21 @ 22:00) (97% - 100%)        07-10 @ 07:01 - 07-11 @ 07:00  --------------------------------------------------------  IN: 3752.8 mL / OUT: 2850 mL / NET: 902.8 mL    07-11 @ 07:01 - 07-11 @ 22:17  --------------------------------------------------------  IN: 1455.8 mL / OUT: 1600 mL / NET: -144.2 mL          ## P/E:  Gen: lying comfortably in bed in no apparent distress  HEENT: PERRL, EOMI  Resp: CTA B/L  CVS: RRR  Abd: soft NT/ND +BS  Ext: no c/c/e  Neuro: A&Ox3, follows commands, no tremors of hands    CENTRAL LINE: [ ] YES [x ] NO  LOCATION:   DATE INSERTED:  REMOVE: [ ] YES [ ] NO      CHAN: [x ] YES [ ] NO    DATE INSERTED:  REMOVE:  [x ] YES [ ] NO      A-LINE:  [ ] YES [x ] NO  LOCATION:   DATE INSERTED:  REMOVE:  [ ] YES [ ] NO  EXPLAIN:    GLOBAL ISSUE/BEST PRACTICE:  Analgesia: typenol, toradol as needed  Sedation: n/a  HOB elevation: yes  Stress ulcer prophylaxis: protonix  VTE prophylaxis: lovenox  Oral Care: n/a  Glycemic control: n/a  Nutrition: po diet - eating and feeding meals by self    CODE STATUS: [x ] full code  [ ] DNR  [ ] DNI  [ ] MOLST  Goals of care discussion: [x ] yes

## 2021-07-11 NOTE — PROGRESS NOTE ADULT - ASSESSMENT
56M PMH chronic ETOH abuse and dependence (1 bottle of Vodka a day) with long standing hx of alcohol withdrawal and DTs with frequent hospital/ICU admissions, alcoholic gastritis/esophagitis, PUD, HLD, hx of hypoxic respiratory failure requiring intubation due to aspiration PNA (most recent intubation Aril 2020),  staph PNA, COVID-19 infection Dec 2020 - did not require intubation presents to ED 7/7 by ambulance for alcohol intoxication , tachycardic, tachypneic, lactate 14.3, Cr 1.57, c/o bilateral hand pain and abdominal pain. Last drink was day of ED presentation. Admitted to medical floor 7/8. 7/9 with agitation CIWA 18 s/p multiple pushes of ativan, phenobarbital. Transferred to ICU for severe DTs requiring sedation protocol    Dx: ETOH withdrawal with DTs, dehydration, lactic acidosis-resolved with IVF, ROSA    - weaned off precedex drip  - cont librium 100mg q8 hours with librium taper  - will cont with prn phenobarbital for breakthrough agitation  - currently calm, awake, alert, oriented x3  - ambulated in unit with staff  - no tremors  - CIWA 1-2  - D/C IVF as pt tolerating PO intake  - MVI/thiamine/folic acid  - Cr normalized, good urine output  - potonix for alcohol gastritis  - lactate cleared  - monitor and supplement electrolytes  - afebrile  - DVT prophylaxis with lovenox  - full code  - stable for discharge home if stable in next 24 hrs. spoke with son Lavelle who is off from work tomorrow and can come pick pt up if pt remains stable and calm. Son will be available to pick pt up after 10/11AM on 7/12.     56M PMH chronic ETOH abuse and dependence (1 bottle of Vodka a day) with long standing hx of alcohol withdrawal and DTs with frequent hospital/ICU admissions, alcoholic gastritis/esophagitis, PUD, HLD, hx of hypoxic respiratory failure requiring intubation due to aspiration PNA (most recent intubation Aril 2020),  staph PNA, COVID-19 infection Dec 2020 - did not require intubation presents to ED 7/7 by ambulance for alcohol intoxication , tachycardic, tachypneic, lactate 14.3, Cr 1.57, c/o bilateral hand pain and abdominal pain. Last drink was day of ED presentation. Admitted to medical floor 7/8. 7/9 with agitation CIWA 18 s/p multiple pushes of ativan, phenobarbital. Transferred to ICU for severe DTs requiring sedation protocol    Dx: ETOH withdrawal with DTs, dehydration, lactic acidosis-resolved with IVF, ROSA    - weaned off precedex drip  - cont librium 100mg q8 hours with librium taper  - will cont with prn phenobarbital for breakthrough agitation  - currently calm, awake, alert, oriented x3  - ambulated in unit with staff  - no tremors  - CIWA 1-2  - D/C IVF as pt tolerating PO intake  - MVI/thiamine/folic acid  - Cr normalized, good urine output  - potonix for alcohol gastritis  - lactate cleared  - monitor and supplement electrolytes  - afebrile  - chronic back pain: s/p tylenol x1, lidocaine patch: no motor or sensory deficits   - DVT prophylaxis with lovenox  - full code  - stable for discharge home if stable in next 24 hrs. spoke with son Lavelle who is off from work tomorrow and can come pick pt up if pt remains stable and calm. Son will be available to pick pt up after 10/11AM on 7/12.

## 2021-07-12 ENCOUNTER — TRANSCRIPTION ENCOUNTER (OUTPATIENT)
Age: 56
End: 2021-07-12

## 2021-07-12 VITALS — TEMPERATURE: 99 F

## 2021-07-12 PROCEDURE — 99233 SBSQ HOSP IP/OBS HIGH 50: CPT | Mod: GC

## 2021-07-12 RX ORDER — FOLIC ACID 0.8 MG
1 TABLET ORAL DAILY
Refills: 0 | Status: DISCONTINUED | OUTPATIENT
Start: 2021-07-12 | End: 2021-07-12

## 2021-07-12 RX ORDER — ACETAMINOPHEN 500 MG
650 TABLET ORAL ONCE
Refills: 0 | Status: COMPLETED | OUTPATIENT
Start: 2021-07-12 | End: 2021-07-12

## 2021-07-12 RX ORDER — THIAMINE MONONITRATE (VIT B1) 100 MG
100 TABLET ORAL ONCE
Refills: 0 | Status: COMPLETED | OUTPATIENT
Start: 2021-07-12 | End: 2021-07-12

## 2021-07-12 RX ORDER — FAMOTIDINE 10 MG/ML
1 INJECTION INTRAVENOUS
Qty: 0 | Refills: 0 | DISCHARGE

## 2021-07-12 RX ADMIN — LIDOCAINE 1 PATCH: 4 CREAM TOPICAL at 06:59

## 2021-07-12 RX ADMIN — LIDOCAINE 1 PATCH: 4 CREAM TOPICAL at 09:54

## 2021-07-12 RX ADMIN — CHLORHEXIDINE GLUCONATE 1 APPLICATION(S): 213 SOLUTION TOPICAL at 06:59

## 2021-07-12 RX ADMIN — Medication 650 MILLIGRAM(S): at 14:50

## 2021-07-12 RX ADMIN — ENOXAPARIN SODIUM 40 MILLIGRAM(S): 100 INJECTION SUBCUTANEOUS at 11:27

## 2021-07-12 RX ADMIN — PANTOPRAZOLE SODIUM 40 MILLIGRAM(S): 20 TABLET, DELAYED RELEASE ORAL at 11:27

## 2021-07-12 RX ADMIN — Medication 650 MILLIGRAM(S): at 15:50

## 2021-07-12 RX ADMIN — Medication 100 MILLIGRAM(S): at 11:27

## 2021-07-12 RX ADMIN — Medication 1 MILLIGRAM(S): at 11:27

## 2021-07-12 NOTE — DISCHARGE NOTE NURSING/CASE MANAGEMENT/SOCIAL WORK - PATIENT PORTAL LINK FT
You can access the FollowMyHealth Patient Portal offered by [facility] by registering at the following website: [email or web address]. By joining [employer]’s FollowMyHealth portal, you will also be able to view your health information using other applications (apps) compatible with our system.

## 2021-07-12 NOTE — PROGRESS NOTE ADULT - ASSESSMENT
56M w/ chronic EtOH abuse w/ long standing hx of alcohol withdrawal and DTs and multiple hospitalizations, COVID19. Admitted to medical floors w/ alcohol withdrawal symptoms and transferred to ICU for worsening symptoms and DTs.     - off precedex and all additional meds for DTs- has not received any additional phenobarb for breakthrough agitation   - continue w/ librium taper  - on folic acid and thiamine  - continue w/ protonix for hx of gastritis  - PO diet  - pt is calm and stable for discharge home today; pt's son called and will pick him up

## 2021-07-12 NOTE — DISCHARGE NOTE PROVIDER - NSDCMRMEDTOKEN_GEN_ALL_CORE_FT
aspirin 81 mg oral delayed release tablet: 1 tab(s) orally once a day  atorvastatin 20 mg oral tablet: 1 tab(s) orally once a day  folic acid 1 mg oral tablet: 1 tab(s) orally once a day  Metoprolol Tartrate 25 mg oral tablet: 1 tab(s) orally 2 times a day  Multiple Vitamins oral tablet: 1 tab(s) orally once a day  Protonix 40 mg oral delayed release tablet: 1 tab(s) orally once a day  telmisartan 40 mg oral tablet: orally 2 times a day  Vitamin B1 100 mg oral tablet: 1 tab(s) orally once a day   aspirin 81 mg oral delayed release tablet: 1 tab(s) orally once a day  atorvastatin 20 mg oral tablet: 1 tab(s) orally once a day  chlordiazePOXIDE 10 mg oral capsule: 2 caps BID X 2 doses, 1 cap TID X 3 doses, 1 cap BID X 2 doses, 1 cap daily X 1 dose MDD:4  folic acid 1 mg oral tablet: 1 tab(s) orally once a day  Metoprolol Tartrate 25 mg oral tablet: 1 tab(s) orally 2 times a day  Multiple Vitamins oral tablet: 1 tab(s) orally once a day  Protonix 40 mg oral delayed release tablet: 1 tab(s) orally once a day  telmisartan 40 mg oral tablet: orally 2 times a day  Vitamin B1 100 mg oral tablet: 1 tab(s) orally once a day

## 2021-07-12 NOTE — PROGRESS NOTE ADULT - SUBJECTIVE AND OBJECTIVE BOX
CHIEF COMPLAINT:    Interval Events:    REVIEW OF SYSTEMS:  Constitutional: [ ] fevers [ ] chills [ ] weight loss [ ] weight gain  HEENT: [ ] dry eyes [ ] eye irritation [ ] postnasal drip [ ] nasal congestion  CV: [ ] chest pain [ ] orthopnea [ ] palpitations [ ] murmur  Resp: [ ] cough [ ] shortness of breath [ ] dyspnea [ ] wheezing [ ] sputum [ ] hemoptysis  GI: [ ] nausea [ ] vomiting [ ] diarrhea [ ] constipation [ ] abd pain [ ] dysphagia   : [ ] dysuria [ ] nocturia [ ] hematuria [ ] increased urinary frequency  Musculoskeletal: [ ] back pain [ ] myalgias [ ] arthralgias [ ] fracture  Skin: [ ] rash [ ] itch  Neurological: [ ] headache [ ] dizziness [ ] syncope [ ] weakness [ ] numbness  Hematologic/Lymphatic: [ ] anemia [ ] bleeding problem  Allergic/Immunologic: [ ] itchy eyes [ ] nasal discharge [ ] hives [ ] angioedema  [ ] All other systems negative  [ ] Unable to assess ROS because ________    OBJECTIVE:  ICU Vital Signs Last 24 Hrs  T(C): 36.6 (12 Jul 2021 08:00), Max: 37.3 (11 Jul 2021 20:00)  T(F): 97.8 (12 Jul 2021 08:00), Max: 99.2 (11 Jul 2021 20:00)  HR: 82 (12 Jul 2021 04:00) (60 - 115)  BP: 120/74 (12 Jul 2021 04:00) (111/56 - 142/77)  BP(mean): 84 (12 Jul 2021 04:00) (67 - 108)  ABP: --  ABP(mean): --  RR: 20 (12 Jul 2021 04:00) (14 - 25)  SpO2: 98% (12 Jul 2021 04:00) (97% - 100%)        07-11 @ 07:01  -  07-12 @ 07:00  --------------------------------------------------------  IN: 1455.8 mL / OUT: 1600 mL / NET: -144.2 mL      CAPILLARY BLOOD GLUCOSE          PHYSICAL EXAM:  General:   HEENT:   Neck:   Respiratory:   Cardiovascular:   Abdomen:   Extremities:   Skin:   Neurological:  Psychiatry:    LINES:    HOSPITAL MEDICATIONS:  MEDICATIONS  (STANDING):  chlordiazePOXIDE   Oral   chlordiazePOXIDE 100 milliGRAM(s) Oral every 12 hours  chlorhexidine 2% Cloths 1 Application(s) Topical <User Schedule>  enoxaparin Injectable 40 milliGRAM(s) SubCutaneous daily  folic acid Injectable 1 milliGRAM(s) IV Push daily  pantoprazole  Injectable 40 milliGRAM(s) IV Push daily  thiamine Injectable 100 milliGRAM(s) IV Push daily    MEDICATIONS  (PRN):      LABS:                        9.8    6.67  )-----------( 173      ( 11 Jul 2021 03:22 )             31.6     Hgb Trend: 9.8<--, 10.4<--, 10.2<--, 9.2<--, 13.5<--  07-11    141  |  107  |  8   ----------------------------<  89  3.6   |  26  |  0.79    Ca    8.4<L>      11 Jul 2021 03:22  Phos  3.6     07-11  Mg     2.0     07-11    TPro  6.9  /  Alb  2.7<L>  /  TBili  0.5  /  DBili  x   /  AST  11<L>  /  ALT  22  /  AlkPhos  51  07-11              MICROBIOLOGY:     RADIOLOGY:  [ ] Reviewed and interpreted by me CHIEF COMPLAINT:    Interval Events:  no overnight events    REVIEW OF SYSTEMS:  Constitutional: [- ] fevers [- ] chills [ ] weight loss [ ] weight gain  HEENT: [- ] dry eyes [- ] eye irritation [ ] postnasal drip [ ] nasal congestion  CV: [- ] chest pain [- ] orthopnea [ -] palpitations [ ] murmur  Resp: [- ] cough [ -] shortness of breath [- ] dyspnea [ -] wheezing [ ] sputum [ ] hemoptysis  GI: [ -] nausea [-] vomiting [- ] diarrhea [ -] constipation [ ] abd pain [ ] dysphagia   : [ ] dysuria [ ] nocturia [ ] hematuria [ ] increased urinary frequency  Musculoskeletal: [- ] back pain [ ] myalgias [ ] arthralgias [ ] fracture  Skin: [ ] rash [ ] itch  Neurological: [ -] headache [ -] dizziness [ ] syncope [ ] weakness [ ] numbness  Hematologic/Lymphatic: [ ] anemia [ ] bleeding problem  Allergic/Immunologic: [ ] itchy eyes [ ] nasal discharge [ ] hives [ ] angioedema  [x ] All other systems negative      OBJECTIVE:  ICU Vital Signs Last 24 Hrs  T(C): 36.6 (12 Jul 2021 08:00), Max: 37.3 (11 Jul 2021 20:00)  T(F): 97.8 (12 Jul 2021 08:00), Max: 99.2 (11 Jul 2021 20:00)  HR: 82 (12 Jul 2021 04:00) (60 - 115)  BP: 120/74 (12 Jul 2021 04:00) (111/56 - 142/77)  BP(mean): 84 (12 Jul 2021 04:00) (67 - 108)  ABP: --  ABP(mean): --  RR: 20 (12 Jul 2021 04:00) (14 - 25)  SpO2: 98% (12 Jul 2021 04:00) (97% - 100%)        07-11 @ 07:01  -  07-12 @ 07:00  --------------------------------------------------------  IN: 1455.8 mL / OUT: 1600 mL / NET: -144.2 mL      CAPILLARY BLOOD GLUCOSE          PHYSICAL EXAM:  General: NAD, non toxic appearing  HEENT: MMM, EOMI  Neck: supple  Respiratory: CTA b/l, no wheezing  Cardiovascular: s1s2 RRR  Abdomen: soft, non tender, non distended  Extremities: warm, no edema or clubbing  Skin: intact  Neurological: no focal deficits  Psychiatry: calm, pleasant, AAOx3    LINES:    HOSPITAL MEDICATIONS:  MEDICATIONS  (STANDING):  chlordiazePOXIDE   Oral   chlordiazePOXIDE 100 milliGRAM(s) Oral every 12 hours  chlorhexidine 2% Cloths 1 Application(s) Topical <User Schedule>  enoxaparin Injectable 40 milliGRAM(s) SubCutaneous daily  folic acid Injectable 1 milliGRAM(s) IV Push daily  pantoprazole  Injectable 40 milliGRAM(s) IV Push daily  thiamine Injectable 100 milliGRAM(s) IV Push daily    MEDICATIONS  (PRN):      LABS:                        9.8    6.67  )-----------( 173      ( 11 Jul 2021 03:22 )             31.6     Hgb Trend: 9.8<--, 10.4<--, 10.2<--, 9.2<--, 13.5<--  07-11    141  |  107  |  8   ----------------------------<  89  3.6   |  26  |  0.79    Ca    8.4<L>      11 Jul 2021 03:22  Phos  3.6     07-11  Mg     2.0     07-11    TPro  6.9  /  Alb  2.7<L>  /  TBili  0.5  /  DBili  x   /  AST  11<L>  /  ALT  22  /  AlkPhos  51  07-11              MICROBIOLOGY:     RADIOLOGY:  [ ] Reviewed and interpreted by me

## 2021-07-12 NOTE — DISCHARGE NOTE PROVIDER - NSDCCPCAREPLAN_GEN_ALL_CORE_FT
PRINCIPAL DISCHARGE DIAGNOSIS  Diagnosis: Alcoholic ketoacidosis  Assessment and Plan of Treatment: continue librium taper      SECONDARY DISCHARGE DIAGNOSES  Diagnosis: Non-intractable vomiting with nausea  Assessment and Plan of Treatment:     Diagnosis: HTN (hypertension)  Assessment and Plan of Treatment: continue metoprolol, telmesartan    Diagnosis: PUD (peptic ulcer disease)  Assessment and Plan of Treatment: continue protonix

## 2021-07-12 NOTE — DISCHARGE NOTE NURSING/CASE MANAGEMENT/SOCIAL WORK - NSDCVIVACCINE_GEN_ALL_CORE_FT
COVID-19, mRNA, LNP-S, PF, 100 mcg/ 0.5 mL dose (Moderna); 10-Jovan-2021 15:38; Reji Hernandez (RN); [employer] [id number] (Exp. Date: 13-Jul-2021); IntraMuscular; Deltoid Right.; 0.5 milliLiter(s);   influenza, injectable, quadrivalent, preservative free; 31-Jan-2019 15:53; Ayaka Bynum (RN); [employer]; [id number] (Exp. Date: 30-Jun-2019); IntraMuscular; Deltoid Right.; 0.5 milliLiter(s); VIS (VIS Published: 07-Aug-2015, VIS Presented: 31-Jan-2019);   influenza, injectable, quadrivalent, preservative free; 10-Nov-2019 14:13; Laverne Lane (RN); [employer]; [id number] (Exp. Date: 30-Jun-2020); IntraMuscular; Deltoid Left.; 0.5 milliLiter(s); VIS (VIS Published: 15-Aug-2019, VIS Presented: 10-Nov-2019);   influenza, injectable, quadrivalent, preservative free; 13-Oct-2020 11:56; Kimberli Cronin (RN); [employer]; [id number] (Exp. Date: 30-Jun-2021); IntraMuscular; Deltoid Right.; 0.5 milliLiter(s); VIS (VIS Published: 15-Aug-2019, VIS Presented: 13-Oct-2020);   Td (adult) preservative free; 18-Jan-2020 20:04; Yulia Vance (RN); [employer]; [id number] (Exp. Date: 19-Jan-2021); IntraMuscular; Deltoid Right.; 0.5 milliLiter(s); VIS (VIS Published: 18-Jan-2020, VIS Presented: 18-Jan-2020);

## 2021-07-12 NOTE — DISCHARGE NOTE PROVIDER - HOSPITAL COURSE
56M PMH chronic ETOH abuse and dependence (1 bottle of Vodka a day) with long standing hx of alcohol withdrawal and DTs with frequent hospital/ICU admissions, alcoholic gastritis/esophagitis, PUD, HLD, hx of hypoxic respiratory failure requiring intubation due to aspiration PNA (most recent intubation Aril 2020),  staph PNA, COVID-19 infection Dec 2020 - presents to ED 7/7 by ambulance for alcohol intoxication , tachycardic, tachypneic, lactate 14.3, Cr 1.57, c/o bilateral hand pain and abdominal pain. Last drink was day of ED presentation. Admitted to medical floor 7/8. 7/9 with agitation CIWA 18 s/p multiple pushes of ativan, phenobarbital. Transferred to ICU for severe DTs requiring sedation protocol    Patient successfully weaned from Precedex, transitioned to librium taper, will discharge home with librium

## 2021-07-15 DIAGNOSIS — N17.9 ACUTE KIDNEY FAILURE, UNSPECIFIED: ICD-10-CM

## 2021-07-15 DIAGNOSIS — E87.2 ACIDOSIS: ICD-10-CM

## 2021-07-15 DIAGNOSIS — E51.9 THIAMINE DEFICIENCY, UNSPECIFIED: ICD-10-CM
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
## 2021-07-15 DIAGNOSIS — G31.2 DEGENERATION OF NERVOUS SYSTEM DUE TO ALCOHOL: ICD-10-CM

## 2021-07-15 DIAGNOSIS — Z86.16 PERSONAL HISTORY OF COVID-19: ICD-10-CM

## 2021-07-15 DIAGNOSIS — Z79.82 LONG TERM (CURRENT) USE OF ASPIRIN: ICD-10-CM

## 2021-07-15 DIAGNOSIS — E83.39 OTHER DISORDERS OF PHOSPHORUS METABOLISM: ICD-10-CM

## 2021-07-15 DIAGNOSIS — F10.229 ALCOHOL DEPENDENCE WITH INTOXICATION, UNSPECIFIED: ICD-10-CM

## 2021-07-15 DIAGNOSIS — I10 ESSENTIAL (PRIMARY) HYPERTENSION: ICD-10-CM

## 2021-07-15 DIAGNOSIS — E86.0 DEHYDRATION: ICD-10-CM

## 2021-07-15 DIAGNOSIS — K29.20 ALCOHOLIC GASTRITIS WITHOUT BLEEDING: ICD-10-CM

## 2021-07-15 DIAGNOSIS — F10.231 ALCOHOL DEPENDENCE WITH WITHDRAWAL DELIRIUM: ICD-10-CM

## 2021-07-15 DIAGNOSIS — E78.2 MIXED HYPERLIPIDEMIA: ICD-10-CM

## 2021-07-15 DIAGNOSIS — E87.6 HYPOKALEMIA: ICD-10-CM

## 2021-07-16 NOTE — ED ADULT NURSE NOTE - PAIN RATING/NUMBER SCALE (0-10): REST
[place] [address]  coding opportunities             Chart reviewed, (number of) suggestions sent to provider: 1                  Patients insurance company: [employer] (Medicare and [employer] for [employer] and [employer])           K01 16 Factor V Leiden mutation     If this is correct, please document and assess at your next visit, July 23  [place] [address]  coding opportunities             Chart Reviewed * (Number of) Inbasket suggestions sent to Provider: 1     Problem listed updated   Provider Accepted, (number of) suggestions accepted: 1               Patients insurance company: [employer] (Medicare and Commercial for [employer] and [employer])     Visit status: Patient arrived for their scheduled appointment
7

## 2021-07-23 NOTE — ED ADULT NURSE NOTE - NS ED NOTE  TALK SOMEONE YN
Sling and swathe applied to left arm. Pt ambulated to and from er cart qureshi F to bathroom about , gait slow and steady using minimal assist holding onto nurse hand during ambulating. Pt did voice little dizziness upon getting out of er cart. Pt voided and urine sample obtained and sent at this time. Er Dr aware of ambulating.       Porsha Andrews, QUINTEN  29/60/14 1910 No

## 2021-08-07 ENCOUNTER — INPATIENT (INPATIENT)
Facility: HOSPITAL | Age: 56
LOS: 5 days | Discharge: HOME | End: 2021-08-13
Attending: INTERNAL MEDICINE | Admitting: INTERNAL MEDICINE
Payer: MEDICAID

## 2021-08-07 VITALS
HEART RATE: 133 BPM | TEMPERATURE: 98 F | OXYGEN SATURATION: 96 % | HEIGHT: 67 IN | SYSTOLIC BLOOD PRESSURE: 164 MMHG | DIASTOLIC BLOOD PRESSURE: 110 MMHG | WEIGHT: 158.95 LBS | RESPIRATION RATE: 20 BRPM

## 2021-08-07 LAB
ALBUMIN SERPL ELPH-MCNC: 3.7 G/DL — SIGNIFICANT CHANGE UP (ref 3.3–5)
ALBUMIN SERPL ELPH-MCNC: 4.3 G/DL — SIGNIFICANT CHANGE UP (ref 3.3–5)
ALP SERPL-CCNC: 63 U/L — SIGNIFICANT CHANGE UP (ref 40–120)
ALP SERPL-CCNC: 69 U/L — SIGNIFICANT CHANGE UP (ref 40–120)
ALT FLD-CCNC: 21 U/L — SIGNIFICANT CHANGE UP (ref 12–78)
ALT FLD-CCNC: 30 U/L — SIGNIFICANT CHANGE UP (ref 12–78)
AMYLASE P1 CFR SERPL: 123 U/L — HIGH (ref 25–115)
ANION GAP SERPL CALC-SCNC: 26 MMOL/L — HIGH (ref 5–17)
ANION GAP SERPL CALC-SCNC: 9 MMOL/L — SIGNIFICANT CHANGE UP (ref 5–17)
AST SERPL-CCNC: 36 U/L — SIGNIFICANT CHANGE UP (ref 15–37)
AST SERPL-CCNC: 47 U/L — HIGH (ref 15–37)
BASOPHILS # BLD AUTO: 0.05 K/UL — SIGNIFICANT CHANGE UP (ref 0–0.2)
BASOPHILS NFR BLD AUTO: 0.5 % — SIGNIFICANT CHANGE UP (ref 0–2)
BILIRUB DIRECT SERPL-MCNC: 0.18 MG/DL — SIGNIFICANT CHANGE UP (ref 0.05–0.2)
BILIRUB INDIRECT FLD-MCNC: 0.7 MG/DL — SIGNIFICANT CHANGE UP (ref 0.2–1)
BILIRUB SERPL-MCNC: 0.5 MG/DL — SIGNIFICANT CHANGE UP (ref 0.2–1.2)
BILIRUB SERPL-MCNC: 0.9 MG/DL — SIGNIFICANT CHANGE UP (ref 0.2–1.2)
BUN SERPL-MCNC: 22 MG/DL — SIGNIFICANT CHANGE UP (ref 7–23)
BUN SERPL-MCNC: 22 MG/DL — SIGNIFICANT CHANGE UP (ref 7–23)
CALCIUM SERPL-MCNC: 8.3 MG/DL — LOW (ref 8.5–10.1)
CALCIUM SERPL-MCNC: 9.3 MG/DL — SIGNIFICANT CHANGE UP (ref 8.5–10.1)
CHLORIDE SERPL-SCNC: 102 MMOL/L — SIGNIFICANT CHANGE UP (ref 96–108)
CHLORIDE SERPL-SCNC: 104 MMOL/L — SIGNIFICANT CHANGE UP (ref 96–108)
CO2 SERPL-SCNC: 12 MMOL/L — LOW (ref 22–31)
CO2 SERPL-SCNC: 26 MMOL/L — SIGNIFICANT CHANGE UP (ref 22–31)
CREAT SERPL-MCNC: 1.16 MG/DL — SIGNIFICANT CHANGE UP (ref 0.5–1.3)
CREAT SERPL-MCNC: 1.45 MG/DL — HIGH (ref 0.5–1.3)
EOSINOPHIL # BLD AUTO: 0 K/UL — SIGNIFICANT CHANGE UP (ref 0–0.5)
EOSINOPHIL NFR BLD AUTO: 0 % — SIGNIFICANT CHANGE UP (ref 0–6)
ETHANOL SERPL-MCNC: 106 MG/DL — HIGH (ref 0–10)
FLUAV AG NPH QL: SIGNIFICANT CHANGE UP
FLUBV AG NPH QL: SIGNIFICANT CHANGE UP
GLUCOSE SERPL-MCNC: 121 MG/DL — HIGH (ref 70–99)
GLUCOSE SERPL-MCNC: 126 MG/DL — HIGH (ref 70–99)
HCT VFR BLD CALC: 40.9 % — SIGNIFICANT CHANGE UP (ref 39–50)
HGB BLD-MCNC: 12.9 G/DL — LOW (ref 13–17)
IMM GRANULOCYTES NFR BLD AUTO: 0.5 % — SIGNIFICANT CHANGE UP (ref 0–1.5)
LIDOCAIN IGE QN: 75 U/L — SIGNIFICANT CHANGE UP (ref 73–393)
LYMPHOCYTES # BLD AUTO: 0.93 K/UL — LOW (ref 1–3.3)
LYMPHOCYTES # BLD AUTO: 9.5 % — LOW (ref 13–44)
MAGNESIUM SERPL-MCNC: 2.1 MG/DL — SIGNIFICANT CHANGE UP (ref 1.6–2.6)
MAGNESIUM SERPL-MCNC: 2.2 MG/DL — SIGNIFICANT CHANGE UP (ref 1.6–2.6)
MCHC RBC-ENTMCNC: 23 PG — LOW (ref 27–34)
MCHC RBC-ENTMCNC: 31.5 GM/DL — LOW (ref 32–36)
MCV RBC AUTO: 72.8 FL — LOW (ref 80–100)
MONOCYTES # BLD AUTO: 0.67 K/UL — SIGNIFICANT CHANGE UP (ref 0–0.9)
MONOCYTES NFR BLD AUTO: 6.8 % — SIGNIFICANT CHANGE UP (ref 2–14)
NEUTROPHILS # BLD AUTO: 8.09 K/UL — HIGH (ref 1.8–7.4)
NEUTROPHILS NFR BLD AUTO: 82.7 % — HIGH (ref 43–77)
NRBC # BLD: 0 /100 WBCS — SIGNIFICANT CHANGE UP (ref 0–0)
PHOSPHATE SERPL-MCNC: 2.1 MG/DL — LOW (ref 2.5–4.5)
PLATELET # BLD AUTO: 278 K/UL — SIGNIFICANT CHANGE UP (ref 150–400)
POTASSIUM SERPL-MCNC: 3.9 MMOL/L — SIGNIFICANT CHANGE UP (ref 3.5–5.3)
POTASSIUM SERPL-MCNC: 4.1 MMOL/L — SIGNIFICANT CHANGE UP (ref 3.5–5.3)
POTASSIUM SERPL-SCNC: 3.9 MMOL/L — SIGNIFICANT CHANGE UP (ref 3.5–5.3)
POTASSIUM SERPL-SCNC: 4.1 MMOL/L — SIGNIFICANT CHANGE UP (ref 3.5–5.3)
PROT SERPL-MCNC: 8.1 GM/DL — SIGNIFICANT CHANGE UP (ref 6–8.3)
PROT SERPL-MCNC: 9.5 GM/DL — HIGH (ref 6–8.3)
RBC # BLD: 5.62 M/UL — SIGNIFICANT CHANGE UP (ref 4.2–5.8)
RBC # FLD: 20.2 % — HIGH (ref 10.3–14.5)
SARS-COV-2 RNA SPEC QL NAA+PROBE: SIGNIFICANT CHANGE UP
SODIUM SERPL-SCNC: 139 MMOL/L — SIGNIFICANT CHANGE UP (ref 135–145)
SODIUM SERPL-SCNC: 140 MMOL/L — SIGNIFICANT CHANGE UP (ref 135–145)
WBC # BLD: 9.79 K/UL — SIGNIFICANT CHANGE UP (ref 3.8–10.5)
WBC # FLD AUTO: 9.79 K/UL — SIGNIFICANT CHANGE UP (ref 3.8–10.5)

## 2021-08-07 PROCEDURE — 71045 X-RAY EXAM CHEST 1 VIEW: CPT | Mod: 26

## 2021-08-07 PROCEDURE — 99223 1ST HOSP IP/OBS HIGH 75: CPT

## 2021-08-07 PROCEDURE — 74176 CT ABD & PELVIS W/O CONTRAST: CPT | Mod: 26,MA

## 2021-08-07 PROCEDURE — 93010 ELECTROCARDIOGRAM REPORT: CPT

## 2021-08-07 PROCEDURE — 99285 EMERGENCY DEPT VISIT HI MDM: CPT

## 2021-08-07 RX ORDER — METOPROLOL TARTRATE 50 MG
25 TABLET ORAL
Refills: 0 | Status: DISCONTINUED | OUTPATIENT
Start: 2021-08-07 | End: 2021-08-11

## 2021-08-07 RX ORDER — ENOXAPARIN SODIUM 100 MG/ML
40 INJECTION SUBCUTANEOUS DAILY
Refills: 0 | Status: DISCONTINUED | OUTPATIENT
Start: 2021-08-07 | End: 2021-08-13

## 2021-08-07 RX ORDER — FOLIC ACID 0.8 MG
1 TABLET ORAL ONCE
Refills: 0 | Status: COMPLETED | OUTPATIENT
Start: 2021-08-07 | End: 2021-08-07

## 2021-08-07 RX ORDER — ONDANSETRON 8 MG/1
4 TABLET, FILM COATED ORAL ONCE
Refills: 0 | Status: COMPLETED | OUTPATIENT
Start: 2021-08-07 | End: 2021-08-07

## 2021-08-07 RX ORDER — PANTOPRAZOLE SODIUM 20 MG/1
40 TABLET, DELAYED RELEASE ORAL
Refills: 0 | Status: DISCONTINUED | OUTPATIENT
Start: 2021-08-08 | End: 2021-08-13

## 2021-08-07 RX ORDER — FOLIC ACID 0.8 MG
1 TABLET ORAL DAILY
Refills: 0 | Status: DISCONTINUED | OUTPATIENT
Start: 2021-08-08 | End: 2021-08-13

## 2021-08-07 RX ORDER — THIAMINE MONONITRATE (VIT B1) 100 MG
100 TABLET ORAL ONCE
Refills: 0 | Status: COMPLETED | OUTPATIENT
Start: 2021-08-07 | End: 2021-08-07

## 2021-08-07 RX ORDER — SODIUM CHLORIDE 9 MG/ML
1000 INJECTION INTRAMUSCULAR; INTRAVENOUS; SUBCUTANEOUS ONCE
Refills: 0 | Status: COMPLETED | OUTPATIENT
Start: 2021-08-07 | End: 2021-08-07

## 2021-08-07 RX ORDER — ASPIRIN/CALCIUM CARB/MAGNESIUM 324 MG
81 TABLET ORAL DAILY
Refills: 0 | Status: DISCONTINUED | OUTPATIENT
Start: 2021-08-07 | End: 2021-08-07

## 2021-08-07 RX ORDER — HYDROMORPHONE HYDROCHLORIDE 2 MG/ML
0.5 INJECTION INTRAMUSCULAR; INTRAVENOUS; SUBCUTANEOUS ONCE
Refills: 0 | Status: DISCONTINUED | OUTPATIENT
Start: 2021-08-07 | End: 2021-08-07

## 2021-08-07 RX ORDER — SODIUM CHLORIDE 9 MG/ML
1000 INJECTION INTRAMUSCULAR; INTRAVENOUS; SUBCUTANEOUS
Refills: 0 | Status: DISCONTINUED | OUTPATIENT
Start: 2021-08-07 | End: 2021-08-08

## 2021-08-07 RX ORDER — ATORVASTATIN CALCIUM 80 MG/1
20 TABLET, FILM COATED ORAL AT BEDTIME
Refills: 0 | Status: DISCONTINUED | OUTPATIENT
Start: 2021-08-07 | End: 2021-08-13

## 2021-08-07 RX ORDER — HYDROMORPHONE HYDROCHLORIDE 2 MG/ML
1 INJECTION INTRAMUSCULAR; INTRAVENOUS; SUBCUTANEOUS ONCE
Refills: 0 | Status: DISCONTINUED | OUTPATIENT
Start: 2021-08-07 | End: 2021-08-07

## 2021-08-07 RX ORDER — THIAMINE MONONITRATE (VIT B1) 100 MG
100 TABLET ORAL DAILY
Refills: 0 | Status: DISCONTINUED | OUTPATIENT
Start: 2021-08-08 | End: 2021-08-13

## 2021-08-07 RX ORDER — PANTOPRAZOLE SODIUM 20 MG/1
40 TABLET, DELAYED RELEASE ORAL ONCE
Refills: 0 | Status: COMPLETED | OUTPATIENT
Start: 2021-08-07 | End: 2021-08-07

## 2021-08-07 RX ADMIN — SODIUM CHLORIDE 75 MILLILITER(S): 9 INJECTION INTRAMUSCULAR; INTRAVENOUS; SUBCUTANEOUS at 18:40

## 2021-08-07 RX ADMIN — Medication 100 MILLIGRAM(S): at 11:43

## 2021-08-07 RX ADMIN — Medication 1 TABLET(S): at 18:40

## 2021-08-07 RX ADMIN — Medication 2 MILLIGRAM(S): at 22:41

## 2021-08-07 RX ADMIN — Medication 2 MILLIGRAM(S): at 18:40

## 2021-08-07 RX ADMIN — ATORVASTATIN CALCIUM 20 MILLIGRAM(S): 80 TABLET, FILM COATED ORAL at 22:40

## 2021-08-07 RX ADMIN — ONDANSETRON 4 MILLIGRAM(S): 8 TABLET, FILM COATED ORAL at 09:41

## 2021-08-07 RX ADMIN — HYDROMORPHONE HYDROCHLORIDE 0.5 MILLIGRAM(S): 2 INJECTION INTRAMUSCULAR; INTRAVENOUS; SUBCUTANEOUS at 18:35

## 2021-08-07 RX ADMIN — ENOXAPARIN SODIUM 40 MILLIGRAM(S): 100 INJECTION SUBCUTANEOUS at 18:40

## 2021-08-07 RX ADMIN — HYDROMORPHONE HYDROCHLORIDE 1 MILLIGRAM(S): 2 INJECTION INTRAMUSCULAR; INTRAVENOUS; SUBCUTANEOUS at 11:25

## 2021-08-07 RX ADMIN — HYDROMORPHONE HYDROCHLORIDE 0.5 MILLIGRAM(S): 2 INJECTION INTRAMUSCULAR; INTRAVENOUS; SUBCUTANEOUS at 17:51

## 2021-08-07 RX ADMIN — Medication 2 MILLIGRAM(S): at 11:44

## 2021-08-07 RX ADMIN — HYDROMORPHONE HYDROCHLORIDE 1 MILLIGRAM(S): 2 INJECTION INTRAMUSCULAR; INTRAVENOUS; SUBCUTANEOUS at 11:43

## 2021-08-07 RX ADMIN — SODIUM CHLORIDE 1000 MILLILITER(S): 9 INJECTION INTRAMUSCULAR; INTRAVENOUS; SUBCUTANEOUS at 09:41

## 2021-08-07 RX ADMIN — HYDROMORPHONE HYDROCHLORIDE 1 MILLIGRAM(S): 2 INJECTION INTRAMUSCULAR; INTRAVENOUS; SUBCUTANEOUS at 09:42

## 2021-08-07 RX ADMIN — HYDROMORPHONE HYDROCHLORIDE 1 MILLIGRAM(S): 2 INJECTION INTRAMUSCULAR; INTRAVENOUS; SUBCUTANEOUS at 16:26

## 2021-08-07 RX ADMIN — Medication 1 MILLIGRAM(S): at 11:43

## 2021-08-07 RX ADMIN — PANTOPRAZOLE SODIUM 40 MILLIGRAM(S): 20 TABLET, DELAYED RELEASE ORAL at 09:41

## 2021-08-07 RX ADMIN — Medication 25 MILLIGRAM(S): at 18:40

## 2021-08-07 NOTE — ED PROVIDER NOTE - CROS ED RESP ALL NEG
Detail Level: Detailed Quality 47: Advance Care Plan: Advance Care Planning discussed and documented in the medical record; patient did not wish or was not able to name a surrogate decision maker or provide an advance care plan. Quality 130: Documentation Of Current Medications In The Medical Record: Current Medications Documented Quality 131: Pain Assessment And Follow-Up: Pain assessment using a standardized tool is documented as negative, no follow-up plan required Quality 402: Tobacco Use And Help With Quitting Among Adolescents: Patient screened for tobacco and is a smoker AND received Cessation Counseling Quality 110: Preventive Care And Screening: Influenza Immunization: Influenza Immunization previously received during influenza season Quality 111:Pneumonia Vaccination Status For Older Adults: Pneumococcal Vaccination not Administered or Previously Received, Reason not Otherwise Specified negative...

## 2021-08-07 NOTE — H&P ADULT - NSICDXPASTSURGICALHX_GEN_ALL_CORE_FT
PAST SURGICAL HISTORY:  No significant past surgical history     
Patient/Caregiver provided printed discharge information.

## 2021-08-07 NOTE — H&P ADULT - HISTORY OF PRESENT ILLNESS
56 years old male with HTN, PUD/gastritis, ETOH abuse c/o severe abd pain nausea vomiting Pt sts he had drunk alcohol vodka large bottle last night. Pt admits alcohol drinking daily. Pt had covid vaccines. Pt denies headache, recent hx of trauma, dizziness, blurred visions, light sensitivities, focal/distal weakness or numbness, neck/back/calfs pain, cough, sob, chest pain, fever, chills, dysuria, hematuria, abnormal stool color, or irregular bowel movements.  Pt has many previous admissions with ETOH withdrawal and gastritis.

## 2021-08-07 NOTE — H&P ADULT - NSHPLABSRESULTS_GEN_ALL_CORE
12.9   9.79  )-----------( 278      ( 07 Aug 2021 09:16 )             40.9     08-07    140  |  102  |  22  ----------------------------<  121<H>  3.9   |  12<L>  |  1.45<H>    Ca    9.3      07 Aug 2021 09:16  Mg     2.2     08-07    TPro  9.5<H>  /  Alb  4.3  /  TBili  0.5  /  DBili  x   /  AST  36  /  ALT  30  /  AlkPhos  69  08-07

## 2021-08-07 NOTE — ED ADULT TRIAGE NOTE - CHIEF COMPLAINT QUOTE
Vomiting since 12mn, last drank alcohol at 8p, abd pain with tremors noted, vomiting several times upon entry to ED

## 2021-08-07 NOTE — ED ADULT NURSE NOTE - NSIMPLEMENTINTERV_GEN_ALL_ED
Implemented All Universal Safety Interventions:  [place] to call system. Call bell, personal items and telephone within reach. Instruct patient to call for assistance. Room bathroom lighting operational. Non-slip footwear when patient is off stretcher. Physically safe environment: no spills, clutter or unnecessary equipment. Stretcher in lowest position, wheels locked, appropriate side rails in place.

## 2021-08-07 NOTE — ED PROVIDER NOTE - NONTENDER LOCATION
right upper quadrant/left lower quadrant/right lower quadrant/periumbilical/umbilical/suprapubic/left costovertebral angle/right costovertebral angle

## 2021-08-07 NOTE — H&P ADULT - NSHPREVIEWOFSYSTEMS_GEN_ALL_CORE
CONSTITUTIONAL: No fever, weight loss, or fatigue  EYES: No eye pain, visual disturbances, or discharge  ENMT:  No difficulty hearing, tinnitus, vertigo; No sinus or throat pain  NECK: No pain or stiffness  BREASTS: No pain, masses, or nipple discharge  RESPIRATORY: No cough, wheezing, chills or hemoptysis; No shortness of breath  CARDIOVASCULAR: No chest pain, palpitations, dizziness, or leg swelling  GASTROINTESTINAL: + abdominal pain. + nausea, vomiting, no hematemesis; No diarrhea or constipation. No melena or hematochezia.  GENITOURINARY: No dysuria, frequency, hematuria, or incontinence  NEUROLOGICAL: No headaches, memory loss, loss of strength, numbness, + tremors  SKIN: No itching, burning, rashes, or lesions   LYMPH NODES: No enlarged glands  ENDOCRINE: No heat or cold intolerance; No hair loss  MUSCULOSKELETAL: No joint pain or swelling; No muscle, back, or extremity pain  PSYCHIATRIC: No depression, anxiety, mood swings, or difficulty sleeping  HEME/LYMPH: No easy bruising, or bleeding gums  ALLERGY AND IMMUNOLOGIC: No hives

## 2021-08-07 NOTE — H&P ADULT - NSHPPHYSICALEXAM_GEN_ALL_CORE
GENERAL: NAD, well-groomed, well-developed  HEAD:  Atraumatic, Normocephalic  EYES: EOMI, PERRLA, conjunctiva and sclera clear  ENMT: No tonsillar erythema, exudates, or enlargement; Moist mucous membranes, Good dentition, No lesions  NECK: Supple, No JVD, Normal thyroid  NERVOUS SYSTEM:  Alert & Oriented X3, non focal +tremor  CHEST/LUNG: Clear to percussion bilaterally; No rales, rhonchi, wheezing, or rubs  HEART: Regular , tachycardic,  No murmurs, rubs, or gallops  ABDOMEN: Soft, Nontender, Nondistended; Bowel sounds present  EXTREMITIES:  2+ Peripheral Pulses, No clubbing, cyanosis, or edema  LYMPH: No lymphadenopathy noted  SKIN: No rashes or lesions

## 2021-08-07 NOTE — ED ADULT NURSE NOTE - OBJECTIVE STATEMENT
ETOH Hx last drink 8pm yesterday c/o generalized abdominal pain, nausea, vomiting. Sinus tachy on monitor. Tremulous, agitated, yelling, asking for pain medication. CIWA 18 - given anxiolytics. CT done. Pt more calm and pain controlled.

## 2021-08-07 NOTE — H&P ADULT - ASSESSMENT
56 years old male with HTN, PUD/gastritis, ETOH abuse c/o severe abd pain nausea vomiting Pt sts he had drunk alcohol vodka large bottle last night. Pt admits alcohol drinking daily. Pt had covid vaccines. Pt denies headache, recent hx of trauma, dizziness, blurred visions, light sensitivities, focal/distal weakness or numbness, neck/back/calfs pain, cough, sob, chest pain, fever, chills, dysuria, hematuria, abnormal stool color, or irregular bowel movements.  Pt has many previous admissions with ETOH withdrawal and gastritis.    ETOH withdrawal:      Abdominal pain/ nausea, vomiting:  - 2/2 Alcoholic gastritis:  - CT with No evidence of bowel obstruction or appendicitis. No evidence of diverticulitis.  Hepatic steatosis.    Lung nodule:  Stable 5 mm subpleural nodule in the RIGHT middle lobe.    HTN:      ROSA:      DVT ppx:       56 years old male with HTN, PUD/gastritis, ETOH abuse c/o severe abd pain nausea vomiting Pt sts he had drunk alcohol vodka large bottle last night. Pt admits alcohol drinking daily. Pt had covid vaccines. Pt denies headache, recent hx of trauma, dizziness, blurred visions, light sensitivities, focal/distal weakness or numbness, neck/back/calfs pain, cough, sob, chest pain, fever, chills, dysuria, hematuria, abnormal stool color, or irregular bowel movements.  Pt has many previous admissions with ETOH withdrawal and gastritis.    ETOH withdrawal:  - Continue Ativan per CIWA protocol  - Multivitamin, folate and multivitamin  - If CIWA > will need 15 ICU eval     Abdominal pain/ nausea, vomiting:  - 2/2 Alcoholic gastritis:  - CT with No evidence of bowel obstruction or appendicitis. No evidence of diverticulitis.  Hepatic steatosis.  - If continues to have pain, nausea keep NPO  - PRN Zofran  - Pt is on ASA, no indication, will stop    Lung nodule:  - Stable 5 mm subpleural nodule in the RIGHT middle lobe.    HTN:  - Continue metoprolol   - Hold ARB for ROSA    ROSA:  - Pre renal  - IVF  - CT with no hydro    Anemia:  - Microcytic  - Check iron profile, stool guaiac     DVT ppx:

## 2021-08-07 NOTE — ED PROVIDER NOTE - OBJECTIVE STATEMENT
56 years old male c/o severe abd pain nausea vomiting Pt sts he had drunk alcohol vodka large bottle last night. Pt admits alcohol drinking daily. Pt had covid vaccines. Pt denies headache, recent hx of trauma, dizziness, blurred visions, light sensitivities, focal/distal weakness or numbness, neck/back/calfs pain, cough, sob, chest pain, fever, chills, dysuria, hematuria, abnormal stool color, or irregular bowel movements.

## 2021-08-08 LAB
ALBUMIN SERPL ELPH-MCNC: 3.3 G/DL — SIGNIFICANT CHANGE UP (ref 3.3–5)
ALP SERPL-CCNC: 57 U/L — SIGNIFICANT CHANGE UP (ref 40–120)
ALT FLD-CCNC: 29 U/L — SIGNIFICANT CHANGE UP (ref 12–78)
ANION GAP SERPL CALC-SCNC: 8 MMOL/L — SIGNIFICANT CHANGE UP (ref 5–17)
AST SERPL-CCNC: 26 U/L — SIGNIFICANT CHANGE UP (ref 15–37)
BILIRUB SERPL-MCNC: 1 MG/DL — SIGNIFICANT CHANGE UP (ref 0.2–1.2)
BUN SERPL-MCNC: 19 MG/DL — SIGNIFICANT CHANGE UP (ref 7–23)
CALCIUM SERPL-MCNC: 8.3 MG/DL — LOW (ref 8.5–10.1)
CHLORIDE SERPL-SCNC: 105 MMOL/L — SIGNIFICANT CHANGE UP (ref 96–108)
CO2 SERPL-SCNC: 27 MMOL/L — SIGNIFICANT CHANGE UP (ref 22–31)
COVID-19 SPIKE DOMAIN AB INTERP: POSITIVE
COVID-19 SPIKE DOMAIN ANTIBODY RESULT: >250 U/ML — HIGH
CREAT SERPL-MCNC: 0.98 MG/DL — SIGNIFICANT CHANGE UP (ref 0.5–1.3)
FERRITIN SERPL-MCNC: 41 NG/ML — SIGNIFICANT CHANGE UP (ref 30–400)
FOLATE SERPL-MCNC: >20 NG/ML — SIGNIFICANT CHANGE UP
GLUCOSE SERPL-MCNC: 95 MG/DL — SIGNIFICANT CHANGE UP (ref 70–99)
HCT VFR BLD CALC: 32.5 % — LOW (ref 39–50)
HGB BLD-MCNC: 10.2 G/DL — LOW (ref 13–17)
IRON SATN MFR SERPL: 381 UG/DL — HIGH (ref 45–165)
IRON SATN MFR SERPL: 95 % — HIGH (ref 16–55)
MAGNESIUM SERPL-MCNC: 2.4 MG/DL — SIGNIFICANT CHANGE UP (ref 1.6–2.6)
MCHC RBC-ENTMCNC: 22.8 PG — LOW (ref 27–34)
MCHC RBC-ENTMCNC: 31.4 GM/DL — LOW (ref 32–36)
MCV RBC AUTO: 72.7 FL — LOW (ref 80–100)
NRBC # BLD: 0 /100 WBCS — SIGNIFICANT CHANGE UP (ref 0–0)
PHOSPHATE SERPL-MCNC: 1.4 MG/DL — LOW (ref 2.5–4.5)
PLATELET # BLD AUTO: 220 K/UL — SIGNIFICANT CHANGE UP (ref 150–400)
POTASSIUM SERPL-MCNC: 3.7 MMOL/L — SIGNIFICANT CHANGE UP (ref 3.5–5.3)
POTASSIUM SERPL-SCNC: 3.7 MMOL/L — SIGNIFICANT CHANGE UP (ref 3.5–5.3)
PROT SERPL-MCNC: 7.4 GM/DL — SIGNIFICANT CHANGE UP (ref 6–8.3)
RBC # BLD: 4.47 M/UL — SIGNIFICANT CHANGE UP (ref 4.2–5.8)
RBC # FLD: 19.1 % — HIGH (ref 10.3–14.5)
SARS-COV-2 IGG+IGM SERPL QL IA: >250 U/ML — HIGH
SARS-COV-2 IGG+IGM SERPL QL IA: POSITIVE
SODIUM SERPL-SCNC: 140 MMOL/L — SIGNIFICANT CHANGE UP (ref 135–145)
TIBC SERPL-MCNC: 402 UG/DL — SIGNIFICANT CHANGE UP (ref 220–430)
TRANSFERRIN SERPL-MCNC: 340 MG/DL — SIGNIFICANT CHANGE UP (ref 200–360)
UIBC SERPL-MCNC: 21 UG/DL — LOW (ref 110–370)
VIT B12 SERPL-MCNC: 570 PG/ML — SIGNIFICANT CHANGE UP (ref 232–1245)
WBC # BLD: 3.91 K/UL — SIGNIFICANT CHANGE UP (ref 3.8–10.5)
WBC # FLD AUTO: 3.91 K/UL — SIGNIFICANT CHANGE UP (ref 3.8–10.5)

## 2021-08-08 PROCEDURE — 99232 SBSQ HOSP IP/OBS MODERATE 35: CPT

## 2021-08-08 RX ORDER — SODIUM,POTASSIUM PHOSPHATES 278-250MG
2 POWDER IN PACKET (EA) ORAL ONCE
Refills: 0 | Status: COMPLETED | OUTPATIENT
Start: 2021-08-08 | End: 2021-08-08

## 2021-08-08 RX ADMIN — ATORVASTATIN CALCIUM 20 MILLIGRAM(S): 80 TABLET, FILM COATED ORAL at 22:02

## 2021-08-08 RX ADMIN — Medication 25 MILLIGRAM(S): at 05:27

## 2021-08-08 RX ADMIN — PANTOPRAZOLE SODIUM 40 MILLIGRAM(S): 20 TABLET, DELAYED RELEASE ORAL at 07:42

## 2021-08-08 RX ADMIN — Medication 100 MILLIGRAM(S): at 12:25

## 2021-08-08 RX ADMIN — Medication 1.5 MILLIGRAM(S): at 21:51

## 2021-08-08 RX ADMIN — Medication 2 MILLIGRAM(S): at 01:27

## 2021-08-08 RX ADMIN — Medication 1 MILLIGRAM(S): at 12:25

## 2021-08-08 RX ADMIN — Medication 2 PACKET(S): at 09:58

## 2021-08-08 RX ADMIN — SODIUM CHLORIDE 75 MILLILITER(S): 9 INJECTION INTRAMUSCULAR; INTRAVENOUS; SUBCUTANEOUS at 07:42

## 2021-08-08 RX ADMIN — SODIUM CHLORIDE 75 MILLILITER(S): 9 INJECTION INTRAMUSCULAR; INTRAVENOUS; SUBCUTANEOUS at 05:28

## 2021-08-08 RX ADMIN — Medication 2 MILLIGRAM(S): at 09:57

## 2021-08-08 RX ADMIN — SODIUM CHLORIDE 75 MILLILITER(S): 9 INJECTION INTRAMUSCULAR; INTRAVENOUS; SUBCUTANEOUS at 12:28

## 2021-08-08 RX ADMIN — Medication 2 MILLIGRAM(S): at 13:52

## 2021-08-08 RX ADMIN — Medication 1 TABLET(S): at 12:25

## 2021-08-08 RX ADMIN — ENOXAPARIN SODIUM 40 MILLIGRAM(S): 100 INJECTION SUBCUTANEOUS at 12:25

## 2021-08-08 RX ADMIN — Medication 2 MILLIGRAM(S): at 05:28

## 2021-08-09 LAB
ANION GAP SERPL CALC-SCNC: 7 MMOL/L — SIGNIFICANT CHANGE UP (ref 5–17)
BUN SERPL-MCNC: 13 MG/DL — SIGNIFICANT CHANGE UP (ref 7–23)
CALCIUM SERPL-MCNC: 8.7 MG/DL — SIGNIFICANT CHANGE UP (ref 8.5–10.1)
CHLORIDE SERPL-SCNC: 108 MMOL/L — SIGNIFICANT CHANGE UP (ref 96–108)
CO2 SERPL-SCNC: 25 MMOL/L — SIGNIFICANT CHANGE UP (ref 22–31)
CREAT SERPL-MCNC: 0.95 MG/DL — SIGNIFICANT CHANGE UP (ref 0.5–1.3)
GLUCOSE BLDC GLUCOMTR-MCNC: 108 MG/DL — HIGH (ref 70–99)
GLUCOSE SERPL-MCNC: 103 MG/DL — HIGH (ref 70–99)
PHOSPHATE SERPL-MCNC: 1.7 MG/DL — LOW (ref 2.5–4.5)
POTASSIUM SERPL-MCNC: 3.6 MMOL/L — SIGNIFICANT CHANGE UP (ref 3.5–5.3)
POTASSIUM SERPL-SCNC: 3.6 MMOL/L — SIGNIFICANT CHANGE UP (ref 3.5–5.3)
SODIUM SERPL-SCNC: 140 MMOL/L — SIGNIFICANT CHANGE UP (ref 135–145)

## 2021-08-09 PROCEDURE — 90792 PSYCH DIAG EVAL W/MED SRVCS: CPT

## 2021-08-09 PROCEDURE — 36410 VNPNXR 3YR/> PHY/QHP DX/THER: CPT

## 2021-08-09 PROCEDURE — 76937 US GUIDE VASCULAR ACCESS: CPT | Mod: 26

## 2021-08-09 PROCEDURE — 99233 SBSQ HOSP IP/OBS HIGH 50: CPT

## 2021-08-09 RX ORDER — PHENOBARBITAL 60 MG
10 TABLET ORAL ONCE
Refills: 0 | Status: DISCONTINUED | OUTPATIENT
Start: 2021-08-09 | End: 2021-08-09

## 2021-08-09 RX ORDER — MIDAZOLAM HYDROCHLORIDE 1 MG/ML
4 INJECTION, SOLUTION INTRAMUSCULAR; INTRAVENOUS ONCE
Refills: 0 | Status: DISCONTINUED | OUTPATIENT
Start: 2021-08-09 | End: 2021-08-09

## 2021-08-09 RX ORDER — PHENOBARBITAL 60 MG
260 TABLET ORAL ONCE
Refills: 0 | Status: DISCONTINUED | OUTPATIENT
Start: 2021-08-09 | End: 2021-08-09

## 2021-08-09 RX ORDER — PHENOBARBITAL 60 MG
130 TABLET ORAL ONCE
Refills: 0 | Status: DISCONTINUED | OUTPATIENT
Start: 2021-08-09 | End: 2021-08-09

## 2021-08-09 RX ADMIN — Medication 25 MILLIGRAM(S): at 05:09

## 2021-08-09 RX ADMIN — MIDAZOLAM HYDROCHLORIDE 4 MILLIGRAM(S): 1 INJECTION, SOLUTION INTRAMUSCULAR; INTRAVENOUS at 20:20

## 2021-08-09 RX ADMIN — Medication 2 MILLIGRAM(S): at 13:04

## 2021-08-09 RX ADMIN — Medication 1.5 MILLIGRAM(S): at 02:12

## 2021-08-09 RX ADMIN — Medication 2 MILLIGRAM(S): at 04:17

## 2021-08-09 RX ADMIN — Medication 50 MILLIGRAM(S): at 17:33

## 2021-08-09 RX ADMIN — Medication 100 MILLIGRAM(S): at 20:08

## 2021-08-09 RX ADMIN — Medication 2 MILLIGRAM(S): at 19:12

## 2021-08-09 RX ADMIN — Medication 2 MILLIGRAM(S): at 15:52

## 2021-08-09 RX ADMIN — PANTOPRAZOLE SODIUM 40 MILLIGRAM(S): 20 TABLET, DELAYED RELEASE ORAL at 08:06

## 2021-08-09 RX ADMIN — Medication 1 TABLET(S): at 11:13

## 2021-08-09 RX ADMIN — Medication 100 MILLIGRAM(S): at 11:13

## 2021-08-09 RX ADMIN — Medication 130 MILLIGRAM(S): at 19:56

## 2021-08-09 RX ADMIN — Medication 2 MILLIGRAM(S): at 18:12

## 2021-08-09 RX ADMIN — Medication 1.5 MILLIGRAM(S): at 13:12

## 2021-08-09 RX ADMIN — Medication 2 MILLIGRAM(S): at 19:54

## 2021-08-09 RX ADMIN — Medication 1 MILLIGRAM(S): at 11:13

## 2021-08-09 RX ADMIN — Medication 260 MILLIGRAM(S): at 23:26

## 2021-08-09 RX ADMIN — Medication 2 MILLIGRAM(S): at 11:28

## 2021-08-09 RX ADMIN — Medication 2 MILLIGRAM(S): at 22:15

## 2021-08-09 RX ADMIN — Medication 25 MILLIGRAM(S): at 17:33

## 2021-08-09 RX ADMIN — ATORVASTATIN CALCIUM 20 MILLIGRAM(S): 80 TABLET, FILM COATED ORAL at 21:36

## 2021-08-09 RX ADMIN — Medication 260 MILLIGRAM(S): at 20:20

## 2021-08-09 RX ADMIN — Medication 25 MILLIGRAM(S): at 05:10

## 2021-08-09 RX ADMIN — MIDAZOLAM HYDROCHLORIDE 4 MILLIGRAM(S): 1 INJECTION, SOLUTION INTRAMUSCULAR; INTRAVENOUS at 22:53

## 2021-08-09 RX ADMIN — Medication 1.5 MILLIGRAM(S): at 10:03

## 2021-08-09 RX ADMIN — Medication 1.5 MILLIGRAM(S): at 05:36

## 2021-08-09 RX ADMIN — ENOXAPARIN SODIUM 40 MILLIGRAM(S): 100 INJECTION SUBCUTANEOUS at 11:13

## 2021-08-09 NOTE — BH CONSULTATION LIAISON ASSESSMENT NOTE - OTHER
full with some lability  wearing make-shift turban from bedsheets  tenuous  low end of fair  deferred at this time  "good!"

## 2021-08-09 NOTE — BH CONSULTATION LIAISON ASSESSMENT NOTE - NSSUICPROTFACT_PSY_ALL_CORE
Identifies reasons for living/Supportive social network of family or friends/Fear of death or the actual act of killing self/Cultural, spiritual and/or moral attitudes against suicide/Engaged in work or school

## 2021-08-09 NOTE — BH CONSULTATION LIAISON ASSESSMENT NOTE - NSBHCHARTREVIEWLAB_PSY_A_CORE FT
08-09    140  |  108  |  13  ----------------------------<  103<H>  3.6   |  25  |  0.95    Ca    8.7      09 Aug 2021 06:45  Phos  1.7     08-09  Mg     2.4     08-08    TPro  7.4  /  Alb  3.3  /  TBili  1.0  /  DBili  x   /  AST  26  /  ALT  29  /  AlkPhos  57  08-08

## 2021-08-09 NOTE — BH CONSULTATION LIAISON ASSESSMENT NOTE - HPI (INCLUDE ILLNESS QUALITY, SEVERITY, DURATION, TIMING, CONTEXT, MODIFYING FACTORS, ASSOCIATED SIGNS AND SYMPTOMS)
PATIENT KNOWN TO WRITER FROM PRIOR ADMISSIONS: 57 yo [place]   male, lives with his son in a private home, works as a , with long history of continuous Alcohol Abuse spanning decades (at most reports 2-3 bottles of vodka 1-2/day), with associated numerous ED visits and hospital admissions (ie. epigastric pain, vomiting, nausea, hematemesis, aspiration pneumonia, esophageal ulcer, UGI bleed, MSSA aspiration PNA, ICU admissions), self-presenting c/o abd pain, nausea, vomiting, stating he had drunk large bottle of vodka last night. BAl 106 on the ED.     EXAM: confused, psychomotor agitation, squirming around the bed, legs over side rail. Semi-somnolent, mumbling, not able to engage in meaningful manner. As per nursing staff, Pt at times distressed and has perceptual distortions. Patient not able ot be redirected, all environmental interventions failed so was ordered haldol 3mg IVP x 1 dose for severe agitation treatment with potential harm to self (trying to climb out bed and pull lines)     PHYSICAL INSPECTION: Patient does not manifest any alcohol withdrawal signs    ISTOP Reference #:[id number]   07/12/2021 07/12/2021 chlordiazepoxide 10 mg capsule  10 4 Mildred Conley E [id number] Medicaid [facility] #[id number]  PATIENT KNOWN TO WRITER FROM PRIOR ADMISSIONS: 55 yo [place]   male, lives with his son in a private home, works as a , with long history of continuous Alcohol Abuse spanning decades (at most reports 2-3 bottles of vodka 1-2/day), with associated numerous ED visits and hospital admissions (ie. epigastric pain, vomiting, nausea, hematemesis, aspiration pneumonia, esophageal ulcer, UGI bleed, MSSA aspiration PNA, ICU admissions), self-presenting c/o abd pain, nausea, vomiting, stating he had drunk large bottle of vodka last night.  on the ED.     EXAM: calm, cooperative, confused but not agitated. Vitals stable; EKG on monitor stable. No overt physical manifestation of alcohol withdrawal sxs. Reports euthymic mood, no complaints. No suicidality. As per Leah speaking nurse, Patient earlier thought [place] was [place].     ISTOP Reference #:[id number]   07/12/2021 07/12/2021 chlordiazepoxide 10 mg capsule  10 4 Mildred Conley E [id number] Medicaid [facility] #[id number]

## 2021-08-09 NOTE — BH CONSULTATION LIAISON ASSESSMENT NOTE - CURRENT MEDICATION
MEDICATIONS  (STANDING):  atorvastatin 20 milliGRAM(s) Oral at bedtime  chlordiazePOXIDE   Oral   chlordiazePOXIDE 50 milliGRAM(s) Oral every 6 hours  enoxaparin Injectable 40 milliGRAM(s) SubCutaneous daily  folic acid 1 milliGRAM(s) Oral daily  metoprolol tartrate 25 milliGRAM(s) Oral two times a day  multivitamin 1 Tablet(s) Oral daily  pantoprazole    Tablet 40 milliGRAM(s) Oral before breakfast  thiamine 100 milliGRAM(s) Oral daily    MEDICATIONS  (PRN):  LORazepam   Injectable 2 milliGRAM(s) IV Push every 1 hour PRN Symptom-triggered: each CIWA -Ar score 8 or GREATER

## 2021-08-09 NOTE — BH CONSULTATION LIAISON ASSESSMENT NOTE - SUMMARY
- acute hypersctive multifactorial delirium at this time secondary to aspiration pneumonia, residual medications in system et al   - no capacity to make his medical decisions - acute delirium which patient always manifests when admitted for alcohol withdrawal management, albeit he is presenting clinically better than usual.   - no capacity to make his medical decisions  - cannot leave AMA

## 2021-08-09 NOTE — BH CONSULTATION LIAISON ASSESSMENT NOTE - NSBHCONSULTRECOMMENDOTHER_PSY_A_CORE FT
elevate head, respiratory PT, medical optimization, discontinue unnecessary medications and simplify regimen agree with PO Librium taper with Ativan PRNs for breakthrough sxs

## 2021-08-09 NOTE — PROCEDURE NOTE - ADDITIONAL PROCEDURE DETAILS
s/p US guided midline placement inserted into the right brachial vein.  4fr x 20cm SL.  No complications  -midline can be accessed

## 2021-08-09 NOTE — BH CONSULTATION LIAISON ASSESSMENT NOTE - NSBHCHARTREVIEWVS_PSY_A_CORE FT
Vital Signs Last 24 Hrs  T(C): 37.1 (09 Aug 2021 11:07), Max: 37.1 (09 Aug 2021 11:07)  T(F): 98.7 (09 Aug 2021 11:07), Max: 98.7 (09 Aug 2021 11:07)  HR: 71 (09 Aug 2021 11:07) (64 - 81)  BP: 131/84 (09 Aug 2021 11:07) (113/85 - 131/84)  BP(mean): --  RR: 19 (09 Aug 2021 11:07) (18 - 19)  SpO2: 100% (09 Aug 2021 11:07) (94% - 100%)

## 2021-08-10 LAB
ANION GAP SERPL CALC-SCNC: 6 MMOL/L — SIGNIFICANT CHANGE UP (ref 5–17)
BUN SERPL-MCNC: 13 MG/DL — SIGNIFICANT CHANGE UP (ref 7–23)
CALCIUM SERPL-MCNC: 8.7 MG/DL — SIGNIFICANT CHANGE UP (ref 8.5–10.1)
CHLORIDE SERPL-SCNC: 110 MMOL/L — HIGH (ref 96–108)
CO2 SERPL-SCNC: 25 MMOL/L — SIGNIFICANT CHANGE UP (ref 22–31)
CREAT SERPL-MCNC: 0.93 MG/DL — SIGNIFICANT CHANGE UP (ref 0.5–1.3)
GLUCOSE SERPL-MCNC: 82 MG/DL — SIGNIFICANT CHANGE UP (ref 70–99)
HCT VFR BLD CALC: 33.9 % — LOW (ref 39–50)
HGB BLD-MCNC: 10.3 G/DL — LOW (ref 13–17)
MAGNESIUM SERPL-MCNC: 2.2 MG/DL — SIGNIFICANT CHANGE UP (ref 1.6–2.6)
MCHC RBC-ENTMCNC: 22.8 PG — LOW (ref 27–34)
MCHC RBC-ENTMCNC: 30.4 GM/DL — LOW (ref 32–36)
MCV RBC AUTO: 75.2 FL — LOW (ref 80–100)
NRBC # BLD: 0 /100 WBCS — SIGNIFICANT CHANGE UP (ref 0–0)
PHOSPHATE SERPL-MCNC: 3.6 MG/DL — SIGNIFICANT CHANGE UP (ref 2.5–4.5)
PLATELET # BLD AUTO: 180 K/UL — SIGNIFICANT CHANGE UP (ref 150–400)
POTASSIUM SERPL-MCNC: 3.5 MMOL/L — SIGNIFICANT CHANGE UP (ref 3.5–5.3)
POTASSIUM SERPL-SCNC: 3.5 MMOL/L — SIGNIFICANT CHANGE UP (ref 3.5–5.3)
RBC # BLD: 4.51 M/UL — SIGNIFICANT CHANGE UP (ref 4.2–5.8)
RBC # FLD: 19.2 % — HIGH (ref 10.3–14.5)
SODIUM SERPL-SCNC: 141 MMOL/L — SIGNIFICANT CHANGE UP (ref 135–145)
WBC # BLD: 5.49 K/UL — SIGNIFICANT CHANGE UP (ref 3.8–10.5)
WBC # FLD AUTO: 5.49 K/UL — SIGNIFICANT CHANGE UP (ref 3.8–10.5)

## 2021-08-10 PROCEDURE — 99232 SBSQ HOSP IP/OBS MODERATE 35: CPT

## 2021-08-10 PROCEDURE — 99223 1ST HOSP IP/OBS HIGH 75: CPT

## 2021-08-10 PROCEDURE — 99231 SBSQ HOSP IP/OBS SF/LOW 25: CPT

## 2021-08-10 RX ADMIN — Medication 100 MILLIGRAM(S): at 11:21

## 2021-08-10 RX ADMIN — Medication 25 MILLIGRAM(S): at 09:59

## 2021-08-10 RX ADMIN — Medication 50 MILLIGRAM(S): at 09:59

## 2021-08-10 RX ADMIN — Medication 50 MILLIGRAM(S): at 23:57

## 2021-08-10 RX ADMIN — Medication 25 MILLIGRAM(S): at 17:10

## 2021-08-10 RX ADMIN — ENOXAPARIN SODIUM 40 MILLIGRAM(S): 100 INJECTION SUBCUTANEOUS at 11:21

## 2021-08-10 RX ADMIN — PANTOPRAZOLE SODIUM 40 MILLIGRAM(S): 20 TABLET, DELAYED RELEASE ORAL at 09:59

## 2021-08-10 RX ADMIN — Medication 2 MILLIGRAM(S): at 20:58

## 2021-08-10 RX ADMIN — Medication 50 MILLIGRAM(S): at 13:40

## 2021-08-10 RX ADMIN — ATORVASTATIN CALCIUM 20 MILLIGRAM(S): 80 TABLET, FILM COATED ORAL at 21:00

## 2021-08-10 RX ADMIN — Medication 1 MILLIGRAM(S): at 11:21

## 2021-08-10 RX ADMIN — Medication 50 MILLIGRAM(S): at 17:10

## 2021-08-10 RX ADMIN — Medication 2 MILLIGRAM(S): at 06:09

## 2021-08-10 RX ADMIN — Medication 1 TABLET(S): at 11:21

## 2021-08-10 NOTE — SWALLOW BEDSIDE ASSESSMENT ADULT - SLP GENERAL OBSERVATIONS
pt seen bedside, alert and oriented x4. pt responded to autobiographical questions with good accuracy, verbalized needs and was able to follow one step directions  noted good speech intelligibility. Repair Performed By Another Provider Text (Leave Blank If You Do Not Want): After obtaining clear surgical margins the defect was repaired by another provider.

## 2021-08-10 NOTE — BH CONSULTATION LIAISON PROGRESS NOTE - OTHER
deferred at this time  tenuous  low end of fair though better then yesterday  full with some lability  higher end of fair  MMSE to be done later  "good!"  mpaired - showed some improvement since admission  impaired - showed some improvement since admission

## 2021-08-10 NOTE — CONSULT NOTE ADULT - SUBJECTIVE AND OBJECTIVE BOX
Patient is a 56y old  Male who presents with a chief complaint of ETOH withdrawal, gastritis (09 Aug 2021 13:27)      HPI:  56 years old male with HTN, PUD/gastritis, ETOH abuse c/o severe abd pain nausea vomiting Pt sts he had drunk alcohol vodka large bottle last night. Pt admits alcohol drinking daily. Pt had covid vaccines. Pt denies headache, recent hx of trauma, dizziness, blurred visions, light sensitivities, focal/distal weakness or numbness, neck/back/calfs pain, cough, sob, chest pain, fever, chills, dysuria, hematuria, abnormal stool color, or irregular bowel movements.  Pt has many previous admissions with ETOH withdrawal and gastritis. (07 Aug 2021 18:02)      Allergies  No Known Allergies        MEDICATIONS  (STANDING):  atorvastatin 20 milliGRAM(s) Oral at bedtime  chlordiazePOXIDE   Oral   chlordiazePOXIDE 50 milliGRAM(s) Oral every 6 hours  enoxaparin Injectable 40 milliGRAM(s) SubCutaneous daily  folic acid 1 milliGRAM(s) Oral daily  metoprolol tartrate 25 milliGRAM(s) Oral two times a day  multivitamin 1 Tablet(s) Oral daily  pantoprazole    Tablet 40 milliGRAM(s) Oral before breakfast  thiamine 100 milliGRAM(s) Oral daily    MEDICATIONS  (PRN):  LORazepam   Injectable 2 milliGRAM(s) IV Push every 1 hour PRN Symptom-triggered: each CIWA -Ar score 8 or GREATER      Daily     Daily Weight in k.2 (09 Aug 2021 15:34)    Drug Dosing Weight  Height (cm): 170.2 (07 Aug 2021 07:49)  Weight (kg): 72.1 (07 Aug 2021 07:49)  BMI (kg/m2): 24.9 (07 Aug 2021 07:49)  BSA (m2): 1.83 (07 Aug 2021 07:49)    PAST MEDICAL & SURGICAL HISTORY:  PUD (peptic ulcer disease)    Mixed hyperlipidemia    EtOH dependence    Gastritis    Substance abuse    DTs (delirium tremens)    No significant past surgical history        FAMILY HISTORY:  non contributory    SOCIAL HISTORY:  , , active drinker    ADVANCE DIRECTIVES:  Full code    REVIEW OF SYSTEMS:  [x] unable to obtain due to agitation/delirium/confusion      ICU Vital Signs Last 24 Hrs  T(C): 36.7 (10 Aug 2021 05:01), Max: 37.1 (09 Aug 2021 11:07)  T(F): 98.1 (10 Aug 2021 05:01), Max: 98.7 (09 Aug 2021 11:07)  HR: 63 (10 Aug 2021 05:01) (63 - 103)  BP: 120/83 (10 Aug 2021 05:01) (120/83 - 160/99)  RR: 16 (10 Aug 2021 05:01) (16 - 20)  SpO2: 100% (10 Aug 2021 05:) (97% - 100%)      PHYSICAL EXAM:    GENERAL: well-groomed, well-developed, moderately agitated screaming and talking to self with noted visual hallucination  HEAD:  Atraumatic, Normocephalic  EYES: EOMI, PERRLA, conjunctiva and sclera clear  ENMT: Moist mucous membranes  NECK: Supple, No JVD  NERVOUS SYSTEM:  awake, moving all extremities, follows some simple commands but yelling and talking to self regarding cars   CHEST/LUNG: CTA bilaterally, no wheeze  HEART: RRR  ABDOMEN: Soft, Nontender, Nondistended; Bowel sounds present  EXTREMITIES:  2+ Peripheral Pulses, No clubbing, cyanosis, or edema; RUE midline      LABS:  CBC Full  -  ( 08 Aug 2021 08:28 )  WBC Count : 3.91 K/uL  RBC Count : 4.47 M/uL  Hemoglobin : 10.2 g/dL  Hematocrit : 32.5 %  Platelet Count - Automated : 220 K/uL  Mean Cell Volume : 72.7 fl  Mean Cell Hemoglobin : 22.8 pg  Mean Cell Hemoglobin Concentration : 31.4 gm/dL  Auto Neutrophil # : x  Auto Lymphocyte # : x  Auto Monocyte # : x  Auto Eosinophil # : x  Auto Basophil # : x  Auto Neutrophil % : x  Auto Lymphocyte % : x  Auto Monocyte % : x  Auto Eosinophil % : x  Auto Basophil % : x        140  |  108  |  13  ----------------------------<  103<H>  3.6   |  25  |  0.95    Ca    8.7      09 Aug 2021 06:45  Phos  1.7       Mg     2.4         TPro  7.4  /  Alb  3.3  /  TBili  1.0  /  DBili  x   /  AST  26  /  ALT  29  /  AlkPhos  57      CAPILLARY BLOOD GLUCOSE  POCT Blood Glucose.: 108 mg/dL (09 Aug 2021 19:53)    Alcohol, Blood (21 @ 09:16)    Alcohol, Blood: 106 mg/dL        CRITICAL CARE TIME SPENT: 60 min

## 2021-08-10 NOTE — BH CONSULTATION LIAISON PROGRESS NOTE - NSBHFUPINTERVALHXFT_PSY_A_CORE
Noted interval RRT and chart notes. Agree with Critical Care recommendations to cont with librium, prn phenobarbital as needed.  Noted interval RRT and chart notes. Agree with Critical Care recommendations to cont with librium, prn phenobarbital as needed. EXAM: Patient is awake, alert, cooperative, calm and says that he only drinks 3 days a week, he is aware that his stomach cannot handle it as well anymore and promises never to drink again, He states that he works hard, sends money to his family. Intermittent disorientation - says earlier today, he went to work and then came home and ate some Roti. No suicidality, denies mood sxs.

## 2021-08-10 NOTE — CONSULT NOTE ADULT - ASSESSMENT
56M PMH chronic ETOH abuse and dependence (1 bottle of Vodka a day) with long standing hx of alcohol withdrawal and DTs with frequent hospital/ICU admissions, alcoholic gastritis/esophagitis, PUD, HLD, hx of hypoxic respiratory failure requiring intubation due to aspiration PNA (most recent intubation Aril 2020),  staph PNA, COVID-19 infection Dec 2020 - did not require intubation presents, recently admitted one month ago for alcohol intoxication, abdominal pain, N/V, gastritis with hospital course complicated by DTs/ETOH withdrawal. Presents again for emesis after drinking 1 bottled of vodka.  on admission 8/7. Now hospital day #2 with increasing agitation, visual hallucinations. now with ETOH withdrawal/DTs.     - at RRT given librium 100mg po x1, phenobarbital 130mg x1, followed by versed 4mg IVP x1, phenobarbital 230mg IVP x1  - pt slowly started to calm down with medication and environmental changes (lights turned off, cool towel over head)  - allowed to take sips of water and ate some fruit  - no diaphoretic, no significant tremors of hands  - cont with librium, prn phenobarbital as needed  - currently calming down, somewhat re-directable  - will remain in current room      ---------------------------------------  pt followed up after RRT around 12:30AM  pt sleeping, calm  will remain in current room

## 2021-08-11 LAB
ANION GAP SERPL CALC-SCNC: 6 MMOL/L — SIGNIFICANT CHANGE UP (ref 5–17)
BUN SERPL-MCNC: 13 MG/DL — SIGNIFICANT CHANGE UP (ref 7–23)
CALCIUM SERPL-MCNC: 8.4 MG/DL — LOW (ref 8.5–10.1)
CHLORIDE SERPL-SCNC: 110 MMOL/L — HIGH (ref 96–108)
CO2 SERPL-SCNC: 24 MMOL/L — SIGNIFICANT CHANGE UP (ref 22–31)
CREAT SERPL-MCNC: 0.94 MG/DL — SIGNIFICANT CHANGE UP (ref 0.5–1.3)
GLUCOSE SERPL-MCNC: 89 MG/DL — SIGNIFICANT CHANGE UP (ref 70–99)
HCT VFR BLD CALC: 33.5 % — LOW (ref 39–50)
HGB BLD-MCNC: 10.4 G/DL — LOW (ref 13–17)
MAGNESIUM SERPL-MCNC: 2.1 MG/DL — SIGNIFICANT CHANGE UP (ref 1.6–2.6)
MCHC RBC-ENTMCNC: 23.3 PG — LOW (ref 27–34)
MCHC RBC-ENTMCNC: 31 GM/DL — LOW (ref 32–36)
MCV RBC AUTO: 74.9 FL — LOW (ref 80–100)
NRBC # BLD: 0 /100 WBCS — SIGNIFICANT CHANGE UP (ref 0–0)
PHOSPHATE SERPL-MCNC: 3.6 MG/DL — SIGNIFICANT CHANGE UP (ref 2.5–4.5)
PLATELET # BLD AUTO: 185 K/UL — SIGNIFICANT CHANGE UP (ref 150–400)
POTASSIUM SERPL-MCNC: 3.6 MMOL/L — SIGNIFICANT CHANGE UP (ref 3.5–5.3)
POTASSIUM SERPL-SCNC: 3.6 MMOL/L — SIGNIFICANT CHANGE UP (ref 3.5–5.3)
RBC # BLD: 4.47 M/UL — SIGNIFICANT CHANGE UP (ref 4.2–5.8)
RBC # FLD: 19.7 % — HIGH (ref 10.3–14.5)
SODIUM SERPL-SCNC: 140 MMOL/L — SIGNIFICANT CHANGE UP (ref 135–145)
WBC # BLD: 3.86 K/UL — SIGNIFICANT CHANGE UP (ref 3.8–10.5)
WBC # FLD AUTO: 3.86 K/UL — SIGNIFICANT CHANGE UP (ref 3.8–10.5)

## 2021-08-11 PROCEDURE — 99232 SBSQ HOSP IP/OBS MODERATE 35: CPT

## 2021-08-11 PROCEDURE — 99231 SBSQ HOSP IP/OBS SF/LOW 25: CPT

## 2021-08-11 RX ORDER — KETOROLAC TROMETHAMINE 30 MG/ML
15 SYRINGE (ML) INJECTION ONCE
Refills: 0 | Status: DISCONTINUED | OUTPATIENT
Start: 2021-08-11 | End: 2021-08-11

## 2021-08-11 RX ADMIN — Medication 1 TABLET(S): at 11:56

## 2021-08-11 RX ADMIN — Medication 50 MILLIGRAM(S): at 23:09

## 2021-08-11 RX ADMIN — Medication 1 MILLIGRAM(S): at 11:56

## 2021-08-11 RX ADMIN — Medication 15 MILLIGRAM(S): at 23:43

## 2021-08-11 RX ADMIN — PANTOPRAZOLE SODIUM 40 MILLIGRAM(S): 20 TABLET, DELAYED RELEASE ORAL at 06:08

## 2021-08-11 RX ADMIN — ENOXAPARIN SODIUM 40 MILLIGRAM(S): 100 INJECTION SUBCUTANEOUS at 11:57

## 2021-08-11 RX ADMIN — ATORVASTATIN CALCIUM 20 MILLIGRAM(S): 80 TABLET, FILM COATED ORAL at 23:09

## 2021-08-11 RX ADMIN — Medication 100 MILLIGRAM(S): at 11:56

## 2021-08-11 RX ADMIN — Medication 25 MILLIGRAM(S): at 05:37

## 2021-08-11 RX ADMIN — Medication 15 MILLIGRAM(S): at 23:09

## 2021-08-11 RX ADMIN — Medication 50 MILLIGRAM(S): at 13:36

## 2021-08-11 NOTE — BH CONSULTATION LIAISON PROGRESS NOTE - OTHER
impaired - showed some improvement since admission  "good!"  low end of fair though better then yesterday  MMSE to be done later  tenuous  full with some lability  higher end of fair  deferred at this time  mpaired - showed some improvement since admission

## 2021-08-11 NOTE — BH CONSULTATION LIAISON PROGRESS NOTE - NSBHFUPINTERVALHXFT_PSY_A_CORE
Patient needed only 2 doses of PRN Ativan IVP for breakthrough sxs since last seen; seemingly doing swell on the oral Librium taper. EXAM: Patient is awake, alert, cooperative, calm and says that he only drinks 3 days a week, he is aware that his stomach cannot handle it as well anymore and promises never to drink again, He states that he works hard, sends money to his family. Intermittent disorientation - says earlier today, he went to work and then came home and ate some Roti. No suicidality, denies mood sxs.

## 2021-08-12 LAB
ANION GAP SERPL CALC-SCNC: 8 MMOL/L — SIGNIFICANT CHANGE UP (ref 5–17)
BUN SERPL-MCNC: 16 MG/DL — SIGNIFICANT CHANGE UP (ref 7–23)
CALCIUM SERPL-MCNC: 8.7 MG/DL — SIGNIFICANT CHANGE UP (ref 8.5–10.1)
CHLORIDE SERPL-SCNC: 110 MMOL/L — HIGH (ref 96–108)
CO2 SERPL-SCNC: 20 MMOL/L — LOW (ref 22–31)
CREAT SERPL-MCNC: 1.02 MG/DL — SIGNIFICANT CHANGE UP (ref 0.5–1.3)
GLUCOSE SERPL-MCNC: 94 MG/DL — SIGNIFICANT CHANGE UP (ref 70–99)
HCT VFR BLD CALC: 36 % — LOW (ref 39–50)
HGB BLD-MCNC: 10.9 G/DL — LOW (ref 13–17)
MAGNESIUM SERPL-MCNC: 2.1 MG/DL — SIGNIFICANT CHANGE UP (ref 1.6–2.6)
MCHC RBC-ENTMCNC: 22.8 PG — LOW (ref 27–34)
MCHC RBC-ENTMCNC: 30.3 GM/DL — LOW (ref 32–36)
MCV RBC AUTO: 75.3 FL — LOW (ref 80–100)
PHOSPHATE SERPL-MCNC: 3.2 MG/DL — SIGNIFICANT CHANGE UP (ref 2.5–4.5)
PLATELET # BLD AUTO: SIGNIFICANT CHANGE UP K/UL (ref 150–400)
POTASSIUM SERPL-MCNC: 3.6 MMOL/L — SIGNIFICANT CHANGE UP (ref 3.5–5.3)
POTASSIUM SERPL-SCNC: 3.6 MMOL/L — SIGNIFICANT CHANGE UP (ref 3.5–5.3)
RBC # BLD: 4.78 M/UL — SIGNIFICANT CHANGE UP (ref 4.2–5.8)
RBC # FLD: 20.4 % — HIGH (ref 10.3–14.5)
SODIUM SERPL-SCNC: 138 MMOL/L — SIGNIFICANT CHANGE UP (ref 135–145)
WBC # BLD: 5.54 K/UL — SIGNIFICANT CHANGE UP (ref 3.8–10.5)
WBC # FLD AUTO: 5.54 K/UL — SIGNIFICANT CHANGE UP (ref 3.8–10.5)

## 2021-08-12 PROCEDURE — 99231 SBSQ HOSP IP/OBS SF/LOW 25: CPT

## 2021-08-12 PROCEDURE — 99232 SBSQ HOSP IP/OBS MODERATE 35: CPT

## 2021-08-12 RX ADMIN — Medication 1 TABLET(S): at 12:37

## 2021-08-12 RX ADMIN — Medication 50 MILLIGRAM(S): at 14:58

## 2021-08-12 RX ADMIN — Medication 100 MILLIGRAM(S): at 12:37

## 2021-08-12 RX ADMIN — ATORVASTATIN CALCIUM 20 MILLIGRAM(S): 80 TABLET, FILM COATED ORAL at 22:01

## 2021-08-12 RX ADMIN — PANTOPRAZOLE SODIUM 40 MILLIGRAM(S): 20 TABLET, DELAYED RELEASE ORAL at 05:57

## 2021-08-12 RX ADMIN — ENOXAPARIN SODIUM 40 MILLIGRAM(S): 100 INJECTION SUBCUTANEOUS at 12:38

## 2021-08-12 RX ADMIN — Medication 50 MILLIGRAM(S): at 05:57

## 2021-08-12 RX ADMIN — Medication 1 MILLIGRAM(S): at 12:37

## 2021-08-12 NOTE — PROGRESS NOTE ADULT - REASON FOR ADMISSION
ETOH withdrawal, gastritis

## 2021-08-12 NOTE — BH CONSULTATION LIAISON PROGRESS NOTE - NSBHCONSULTRECOMMENDOTHER_PSY_A_CORE FT
agree with PO Librium taper with Ativan PRNs for breakthrough sxs; continue taper 
agree with PO Librium taper with Ativan PRNs for breakthrough sxs; continue taper   - getting better 
agree with PO Librium taper with Ativan PRNs for breakthrough sxs

## 2021-08-12 NOTE — PROGRESS NOTE ADULT - ASSESSMENT
56 years old male with HTN, PUD/gastritis, ETOH abuse admitted with alcohol withdrawal.      # ETOH withdrawal:  - Continue Ativan per CIWA protocol - Multivitamin, folate and multivitamin - If CIWA > will need 15 ICU eval.  Librium taper.    # Metabolic Encephalopathy, Agitation - due to above.  Mental status improving.  # Abdominal pain/ nausea, vomiting:  - 2/2 Alcoholic gastritis - CT with No evidence of bowel obstruction or appendicitis. No evidence of diverticulitis.Hepatic steatosis. - Neg lipase - Advance diet - PRN Zofran  # Lung nodule: - Stable 5 mm subpleural nodule in the RIGHT middle lobe.  He will need outpatient f/u.   # HTN: - BP is low normal.  HOld antihypertensives.    # ROSA: - Pre renal - IVF - CT with no hydro.  Cr improved.    # Anemia: - Microcytic.  Hgb stable.    # Hypophosphatemia: - resolved.   DVT ppx:      
56 years old male with HTN, PUD/gastritis, ETOH abuse admitted with alcohol withdrawal.      # ETOH withdrawal:  - Continue Ativan per CIWA protocol - Multivitamin, folate and multivitamin - If CIWA > will need 15 ICU eval.  Librium taper.    # Metabolic Encephalopathy, Agitation - due to above.  Mental status improving.  # Abdominal pain/ nausea, vomiting:  - 2/2 Alcoholic gastritis - CT with No evidence of bowel obstruction or appendicitis. No evidence of diverticulitis.Hepatic steatosis. - Neg lipase - Advance diet - PRN Zofran  # Lung nodule: - Stable 5 mm subpleural nodule in the RIGHT middle lobe.  Outpatient f/u.   # HTN: - Continue metoprolol  - Hold ARB for ROSA  # ROSA: - Pre renal - IVF - CT with no hydro.  Cr improved.    # Anemia: - Microcytic.  Hgb stable.    # Hypophosphatemia: - Replaced yesterday, stable.   DVT ppx:      
56 years old male with HTN, PUD/gastritis, ETOH abuse and frequent admissions here for withdrawal; admitted with alcohol withdrawal.      # ETOH withdrawal:    - Continue Ativan per CIWA protocol - Multivitamin, folate and multivitamin - If CIWA > will need 15 ICU eval.    Librium taper.  -- TID today; BID on 8/13; one final dose 8/14    # Metabolic Encephalopathy, Agitation - due to above.    Mental status improving.    # Abdominal pain/ nausea, vomiting:  - 2/2 Alcoholic gastritis   - CT with No evidence of bowel obstruction or appendicitis. No evidence of diverticulitis.Hepatic steatosis.   - Neg lipase   - Advance diet   - PRN Zofran    # Lung nodule:   - Stable 5 mm subpleural nodule in the RIGHT middle lobe.  He will need outpatient f/u.     # HTN:   - BP is low normal.  Hold antihypertensives.      # ROSA:   - Pre renal   - IVF   - CT with no hydro.  Cr improved.      # Anemia:   - Microcytic.  Hgb stable.      # Hypophosphatemia:   - resolved.     DVT ppx:   Lovenox    Disp:   Home once his librium tapers down further   would benefit from inpatient rehab, but unlikely he'd accept.
56 years old male with HTN, PUD/gastritis, ETOH abuse c/o severe abd pain nausea vomiting Pt sts he had drunk alcohol vodka large bottle last night. Pt admits alcohol drinking daily. Pt had covid vaccines. Pt denies headache, recent hx of trauma, dizziness, blurred visions, light sensitivities, focal/distal weakness or numbness, neck/back/calfs pain, cough, sob, chest pain, fever, chills, dysuria, hematuria, abnormal stool color, or irregular bowel movements.  Pt has many previous admissions with ETOH withdrawal and gastritis.    ETOH withdrawal:  - Continue Ativan per CIWA protocol  - Multivitamin, folate and multivitamin  - If CIWA > will need 15 ICU eval     Abdominal pain/ nausea, vomiting:  - 2/2 Alcoholic gastritis  - CT with No evidence of bowel obstruction or appendicitis. No evidence of diverticulitis.  Hepatic steatosis.  - Neg lipase  - Advance diet  - PRN Zofran  - Pt is on ASA, no indication, will stop    Lung nodule:  - Stable 5 mm subpleural nodule in the RIGHT middle lobe.    HTN:  - Continue metoprolol   - Hold ARB for ROSA    ROSA:  - Pre renal  - IVF  - CT with no hydro    Anemia:  - Microcytic  - Check iron profile, stool guaiac     Hypophosphatemia:  - Replaced    DVT ppx:      
56 years old male with HTN, PUD/gastritis, ETOH abuse admitted with alcohol withdrawal.      # ETOH withdrawal:  - Continue Ativan per CIWA protocol - Multivitamin, folate and multivitamin - If CIWA > will need 15 ICU eval.  Current CIWA 11, responded with IV Ativan.  Change taper to po Librium.  Pt redirectable.  Transfer to monitored bed.  Constant observation.   # Metabolic Encephalopathy, Agitation - due to above.  Confirmed with  that pt is confused.  Similar prior episodes in previous hospitalizations.  Plan as above.   # Abdominal pain/ nausea, vomiting:  - 2/2 Alcoholic gastritis - CT with No evidence of bowel obstruction or appendicitis. No evidence of diverticulitis.Hepatic steatosis. - Neg lipase - Advance diet - PRN Zofran  # Lung nodule: - Stable 5 mm subpleural nodule in the RIGHT middle lobe.  Outpatient f/u.   # HTN: - Continue metoprolol  - Hold ARB for ROSA  # ROSA: - Pre renal - IVF - CT with no hydro.  Cr improved.    # Anemia: - Microcytic.  Hgb stable.    # Hypophosphatemia: - Replaced  DVT ppx:

## 2021-08-12 NOTE — BH CONSULTATION LIAISON PROGRESS NOTE - NSBHFUPINTERVALHXFT_PSY_A_CORE
Patient needed not need any PRN Ativan IVP for breakthrough sxs since last seen; continues to do well on the oral Librium taper. EXAM: Patient is awake, alert, cooperative, calm and reports that his stomach feels much better, he is having normal BMs, he is eating without any GI issues. More linear and oriented.

## 2021-08-12 NOTE — BH CONSULTATION LIAISON PROGRESS NOTE - OTHER
higher end of fair  MMSE to be done later  mpaired - showed some improvement since admission  "good!"  tenuous  impaired - showed some improvement since admission  low end of fair though better then yesterday  full with some lability  deferred at this time

## 2021-08-12 NOTE — PROGRESS NOTE ADULT - SUBJECTIVE AND OBJECTIVE BOX
Patient: VANDANA VILLA [id number] 56y Male                            Hospitalist Attending Note    Pt agitated, seen with RN, security at bedside.  Seen with Leah  via device.   Denies specific complaints.  Denies pain.   CIWA 11    ____________________PHYSICAL EXAM:  GENERAL:  Agitated, Alert, confused.    HEENT: NCAT  CARDIOVASCULAR:  S1, S2  LUNGS: CTAB  ABDOMEN:  soft, (-) tenderness, (-) distension, (+) bowel sounds, (-) guarding, (-) rebound (-) rigidity  EXTREMITIES:  no cyanosis / clubbing / edema.   ____________________     VITALS:  Vital Signs Last 24 Hrs  T(C): 37.1 (09 Aug 2021 11:07), Max: 37.1 (09 Aug 2021 11:07)  T(F): 98.7 (09 Aug 2021 11:07), Max: 98.7 (09 Aug 2021 11:07)  HR: 71 (09 Aug 2021 11:07) (64 - 81)  BP: 131/84 (09 Aug 2021 11:07) (113/85 - 131/84)  BP(mean): --  RR: 19 (09 Aug 2021 11:07) (18 - 19)  SpO2: 100% (09 Aug 2021 11:07) (94% - 100%) Daily     Daily   CAPILLARY BLOOD GLUCOSE        I&O's Summary    08 Aug 2021 07:01  -  09 Aug 2021 07:00  --------------------------------------------------------  IN: 900 mL / OUT: 0 mL / NET: 900 mL        HISTORY:  PAST MEDICAL & SURGICAL HISTORY:  PUD (peptic ulcer disease)    Mixed hyperlipidemia    EtOH dependence    Gastritis    Substance abuse    DTs (delirium tremens)    No significant past surgical history    Allergies    No Known Allergies    Intolerances       LABS:                        10.2   3.91  )-----------( 220      ( 08 Aug 2021 08:28 )             32.5     08-09    140  |  108  |  13  ----------------------------<  103<H>  3.6   |  25  |  0.95    Ca    8.7      09 Aug 2021 06:45  Phos  1.7     08-09  Mg     2.4     08-08    TPro  7.4  /  Alb  3.3  /  TBili  1.0  /  DBili  x   /  AST  26  /  ALT  29  /  AlkPhos  57  08-08      LIVER FUNCTIONS - ( 08 Aug 2021 08:28 )  Alb: 3.3 g/dL / Pro: 7.4 gm/dL / ALK PHOS: 57 U/L / ALT: 29 U/L / AST: 26 U/L / GGT: x                     MEDICATIONS:  MEDICATIONS  (STANDING):  atorvastatin 20 milliGRAM(s) Oral at bedtime  chlordiazePOXIDE   Oral   enoxaparin Injectable 40 milliGRAM(s) SubCutaneous daily  folic acid 1 milliGRAM(s) Oral daily  metoprolol tartrate 25 milliGRAM(s) Oral two times a day  multivitamin 1 Tablet(s) Oral daily  pantoprazole    Tablet 40 milliGRAM(s) Oral before breakfast  thiamine 100 milliGRAM(s) Oral daily    MEDICATIONS  (PRN):  LORazepam   Injectable 2 milliGRAM(s) IV Push every 1 hour PRN Symptom-triggered: each CIWA -Ar score 8 or GREATER  
                          Patient: VANDANA VILLA [id number] 56y Male                            Hospitalist Attending Note    More alert.  Speaking English.  Denies complaints.    ____________________PHYSICAL EXAM:  GENERAL:  NAD, Awake, confused.  HEENT: NCAT  CARDIOVASCULAR:  S1, S2  LUNGS: CTAB  ABDOMEN:  soft, (-) tenderness, (-) distension, (+) bowel sounds, (-) guarding, (-) rebound (-) rigidity  EXTREMITIES:  no cyanosis / clubbing / edema.   ____________________    VITALS:  Vital Signs Last 24 Hrs  T(C): 36.7 (10 Aug 2021 09:32), Max: 36.8 (09 Aug 2021 14:57)  T(F): 98 (10 Aug 2021 09:32), Max: 98.3 (09 Aug 2021 14:57)  HR: 65 (10 Aug 2021 09:32) (63 - 103)  BP: 112/69 (10 Aug 2021 09:32) (112/69 - 160/99)  BP(mean): --  RR: 18 (10 Aug 2021 09:32) (16 - 20)  SpO2: 100% (10 Aug 2021 09:32) (97% - 100%) Daily     Daily Weight in k.2 (09 Aug 2021 15:34)  CAPILLARY BLOOD GLUCOSE      POCT Blood Glucose.: 108 mg/dL (09 Aug 2021 19:53)    I&O's Summary      LABS:                        10.3   5.49  )-----------( 180      ( 10 Aug 2021 07:25 )             33.9     08-10    141  |  110<H>  |  13  ----------------------------<  82  3.5   |  25  |  0.93    Ca    8.7      10 Aug 2021 07:25  Phos  3.6     08-10  Mg     2.2     08-10                    MEDICATIONS:  atorvastatin 20 milliGRAM(s) Oral at bedtime  chlordiazePOXIDE   Oral   chlordiazePOXIDE 50 milliGRAM(s) Oral every 6 hours  enoxaparin Injectable 40 milliGRAM(s) SubCutaneous daily  folic acid 1 milliGRAM(s) Oral daily  LORazepam   Injectable 2 milliGRAM(s) IV Push every 1 hour PRN  metoprolol tartrate 25 milliGRAM(s) Oral two times a day  multivitamin 1 Tablet(s) Oral daily  pantoprazole    Tablet 40 milliGRAM(s) Oral before breakfast  thiamine 100 milliGRAM(s) Oral daily    
                          Patient: VANDANA VILLA [id number] 56y Male                            Hospitalist Attending Note    CIWA was 12 this am, currently 1.  Arousable, denies complaints.    ____________________PHYSICAL EXAM:  GENERAL:  NAD, Arousable, confused.  HEENT: NCAT  CARDIOVASCULAR:  S1, S2  LUNGS: CTAB  ABDOMEN:  soft, (-) tenderness, (-) distension, (+) bowel sounds, (-) guarding, (-) rebound (-) rigidity  EXTREMITIES:  no cyanosis / clubbing / edema.   ____________________    VITALS:  Vital Signs Last 24 Hrs  T(C): 36.7 (11 Aug 2021 11:37), Max: 36.8 (11 Aug 2021 05:27)  T(F): 98.1 (11 Aug 2021 11:37), Max: 98.2 (11 Aug 2021 05:27)  HR: 75 (11 Aug 2021 11:37) (58 - 75)  BP: 98/66 (11 Aug 2021 11:37) (98/66 - 124/82)  BP(mean): --  RR: 18 (11 Aug 2021 11:37) (18 - 18)  SpO2: 99% (11 Aug 2021 11:37) (99% - 100%) Daily     Daily Weight in k.2 (11 Aug 2021 05:27)  CAPILLARY BLOOD GLUCOSE        I&O's Summary    11 Aug 2021 07:01  -  11 Aug 2021 15:05  --------------------------------------------------------  IN: 480 mL / OUT: 0 mL / NET: 480 mL        LABS:                        10.4   3.86  )-----------( 185      ( 11 Aug 2021 06:57 )             33.5     08-11    140  |  110<H>  |  13  ----------------------------<  89  3.6   |  24  |  0.94    Ca    8.4<L>      11 Aug 2021 06:57  Phos  3.6     08-  Mg     2.1     -                    MEDICATIONS:  atorvastatin 20 milliGRAM(s) Oral at bedtime  chlordiazePOXIDE   Oral   chlordiazePOXIDE 50 milliGRAM(s) Oral every 8 hours  enoxaparin Injectable 40 milliGRAM(s) SubCutaneous daily  folic acid 1 milliGRAM(s) Oral daily  LORazepam   Injectable 2 milliGRAM(s) IV Push every 1 hour PRN  multivitamin 1 Tablet(s) Oral daily  pantoprazole    Tablet 40 milliGRAM(s) Oral before breakfast  thiamine 100 milliGRAM(s) Oral daily    
Patient is a 56y old  Male who presents with a chief complaint of ETOH withdrawal, gastritis (07 Aug 2021 18:02)    INTERVAL HPI/OVERNIGHT EVENTS:  Pt was seen and examined, no acute events.    MEDICATIONS  (STANDING):  atorvastatin 20 milliGRAM(s) Oral at bedtime  enoxaparin Injectable 40 milliGRAM(s) SubCutaneous daily  folic acid 1 milliGRAM(s) Oral daily  LORazepam   Injectable 1.5 milliGRAM(s) IV Push every 4 hours  LORazepam   Injectable   IV Push   metoprolol tartrate 25 milliGRAM(s) Oral two times a day  multivitamin 1 Tablet(s) Oral daily  pantoprazole    Tablet 40 milliGRAM(s) Oral before breakfast  thiamine 100 milliGRAM(s) Oral daily    MEDICATIONS  (PRN):  LORazepam   Injectable 2 milliGRAM(s) IV Push every 1 hour PRN Symptom-triggered: each CIWA -Ar score 8 or GREATER      Allergies  No Known Allergies      Vital Signs Last 24 Hrs  T(C): 36.4 (08 Aug 2021 17:22), Max: 37.2 (08 Aug 2021 06:22)  T(F): 97.6 (08 Aug 2021 17:22), Max: 99 (08 Aug 2021 06:22)  HR: 81 (08 Aug 2021 17:22) (72 - 86)  BP: 131/83 (08 Aug 2021 17:22) (131/83 - 143/93)  BP(mean): --  RR: 18 (08 Aug 2021 17:22) (18 - 18)  SpO2: 96% (08 Aug 2021 17:22) (95% - 97%)    PHYSICAL EXAM:  GENERAL: NAD, well-groomed, well-developed  HEAD:  Atraumatic, Normocephalic  EYES: EOMI, PERRLA, conjunctiva and sclera clear  ENMT: No tonsillar erythema, exudates, or enlargement; Moist mucous membranes, Good dentition, No lesions  NECK: Supple, No JVD, Normal thyroid  NERVOUS SYSTEM:  Alert & Oriented X3, non focal +tremor  CHEST/LUNG: Clear to percussion bilaterally; No rales, rhonchi, wheezing, or rubs  HEART: Regular , tachycardic,  No murmurs, rubs, or gallops  ABDOMEN: Soft, Nontender, Nondistended; Bowel sounds present  EXTREMITIES:  2+ Peripheral Pulses, No clubbing, cyanosis, or edema  LYMPH: No lymphadenopathy noted  SKIN: No rashes or lesions    LABS:                        10.2   3.91  )-----------( 220      ( 08 Aug 2021 08:28 )             32.5     08-08    140  |  105  |  19  ----------------------------<  95  3.7   |  27  |  0.98    Ca    8.3<L>      08 Aug 2021 08:28  Phos  1.4     08-08  Mg     2.4     08-08    TPro  7.4  /  Alb  3.3  /  TBili  1.0  /  DBili  x   /  AST  26  /  ALT  29  /  AlkPhos  57  08-08        CAPILLARY BLOOD GLUCOSE          RADIOLOGY & ADDITIONAL TESTS:    Imaging Personally Reviewed:  [ ] YES  [ ] NO    Consultant(s) Notes Reviewed:  [ ] YES  [ ] NO    Care Discussed with Consultants/Other Providers [ ] YES  [ ] NO
Patient is a 56y old  Male who presents with a chief complaint of ETOH withdrawal, gastritis (11 Aug 2021 15:03)      INTERVAL HPI/OVERNIGHT EVENTS:    Was not my patient yesterday, but I passed by several times to see his roommate. Pt appears much less confused today.    REVIEW OF SYSTEMS:   Remaining ROS negative    MEDICATIONS  (STANDING):  atorvastatin 20 milliGRAM(s) Oral at bedtime  chlordiazePOXIDE   Oral   enoxaparin Injectable 40 milliGRAM(s) SubCutaneous daily  folic acid 1 milliGRAM(s) Oral daily  multivitamin 1 Tablet(s) Oral daily  pantoprazole    Tablet 40 milliGRAM(s) Oral before breakfast  thiamine 100 milliGRAM(s) Oral daily    MEDICATIONS  (PRN):  LORazepam   Injectable 2 milliGRAM(s) IV Push every 1 hour PRN Symptom-triggered: each CIWA -Ar score 8 or GREATER      Allergies    No Known Allergies    Intolerances        Vital Signs Last 24 Hrs  T(C): 37 (12 Aug 2021 11:36), Max: 37 (12 Aug 2021 11:36)  T(F): 98.6 (12 Aug 2021 11:36), Max: 98.6 (12 Aug 2021 11:36)  HR: 74 (12 Aug 2021 11:36) (73 - 87)  BP: 102/67 (12 Aug 2021 11:36) (102/67 - 124/81)  BP(mean): --  RR: 16 (12 Aug 2021 11:36) (16 - 18)  SpO2: 99% (12 Aug 2021 11:36) (97% - 99%)    PHYSICAL EXAM:  GENERAL: NAD  HEAD:  Atraumatic, Normocephalic  EYES: EOMI, PERRLA, conjunctiva and sclera clear  ENT: O/P Clear  NECK: Supple, No JVD  NERVOUS SYSTEM:  No focal deficits  CHEST/LUNG: Clear to percussion bilaterally; No rales, rhonchi, wheezing  HEART: Regular rate and rhythm; No murmurs, rubs, or gallops  ABDOMEN: Soft, Nontender, Nondistended; Bowel sounds present  EXTREMITIES:  2+ Peripheral Pulses, No clubbing, cyanosis, or edema  SKIN: No rashes or lesions    LABS:                        See note  See Note )-----------( Clotted    ( 12 Aug 2021 08:32 )             See note    08-12    138  |  110<H>  |  16  ----------------------------<  94  3.6   |  20<L>  |  1.02    Ca    8.7      12 Aug 2021 08:32  Phos  3.2     08-12  Mg     2.1     08-12          CAPILLARY BLOOD GLUCOSE          RADIOLOGY & ADDITIONAL TESTS:    Imaging Personally Reviewed:  [ ] YES  [ ] NO    Consultant(s) Notes Reviewed:  [ ] YES  [ ] NO    Care Discussed with Consultants/Other Providers [ ] YES  [ ] NO

## 2021-08-13 ENCOUNTER — TRANSCRIPTION ENCOUNTER (OUTPATIENT)
Age: 56
End: 2021-08-13

## 2021-08-13 VITALS
DIASTOLIC BLOOD PRESSURE: 79 MMHG | SYSTOLIC BLOOD PRESSURE: 125 MMHG | OXYGEN SATURATION: 100 % | HEART RATE: 71 BPM | TEMPERATURE: 98 F | RESPIRATION RATE: 18 BRPM

## 2021-08-13 PROCEDURE — 99231 SBSQ HOSP IP/OBS SF/LOW 25: CPT

## 2021-08-13 PROCEDURE — 99238 HOSP IP/OBS DSCHRG MGMT 30/<: CPT

## 2021-08-13 RX ORDER — TELMISARTAN 20 MG/1
0 TABLET ORAL
Qty: 0 | Refills: 0 | DISCHARGE

## 2021-08-13 RX ORDER — METOPROLOL TARTRATE 50 MG
1 TABLET ORAL
Qty: 0 | Refills: 0 | DISCHARGE

## 2021-08-13 RX ADMIN — Medication 50 MILLIGRAM(S): at 17:11

## 2021-08-13 RX ADMIN — PANTOPRAZOLE SODIUM 40 MILLIGRAM(S): 20 TABLET, DELAYED RELEASE ORAL at 05:39

## 2021-08-13 RX ADMIN — Medication 1 TABLET(S): at 11:49

## 2021-08-13 RX ADMIN — Medication 1 MILLIGRAM(S): at 11:49

## 2021-08-13 RX ADMIN — ENOXAPARIN SODIUM 40 MILLIGRAM(S): 100 INJECTION SUBCUTANEOUS at 11:49

## 2021-08-13 RX ADMIN — Medication 50 MILLIGRAM(S): at 05:38

## 2021-08-13 RX ADMIN — Medication 100 MILLIGRAM(S): at 11:49

## 2021-08-13 NOTE — DISCHARGE NOTE PROVIDER - PROVIDER TOKENS
FREE:[LAST:[Your primary physician -- Dr. Lopez (?)],PHONE:[(   )    -],FAX:[(   )    -],FOLLOWUP:[1 week]]

## 2021-08-13 NOTE — BH CONSULTATION LIAISON PROGRESS NOTE - NSICDXBHPRIMARYDX_PSY_ALL_CORE
Alcohol abuse with withdrawal   F10.139  

## 2021-08-13 NOTE — BH CONSULTATION LIAISON PROGRESS NOTE - NSBHCHARTREVIEWLAB_PSY_A_CORE FT
08-10    141  |  110<H>  |  13  ----------------------------<  82  3.5   |  25  |  0.93    Ca    8.7      10 Aug 2021 07:25  Phos  3.6     08-10  Mg     2.2     08-10    
08-12    138  |  110<H>  |  16  ----------------------------<  94  3.6   |  20<L>  |  1.02    Ca    8.7      12 Aug 2021 08:32  Phos  3.2     08-12  Mg     2.1     08-12    
08-11    140  |  110<H>  |  13  ----------------------------<  89  3.6   |  24  |  0.94    Ca    8.4<L>      11 Aug 2021 06:57  Phos  3.6     08-11  Mg     2.1     08-11    
08-12    138  |  110<H>  |  16  ----------------------------<  94  3.6   |  20<L>  |  1.02    Ca    8.7      12 Aug 2021 08:32  Phos  3.2     08-12  Mg     2.1     08-12

## 2021-08-13 NOTE — BH CONSULTATION LIAISON PROGRESS NOTE - NSBHMSESPEECH_PSY_A_CORE
Normal volume, rate, productivity, spontaneity and articulation

## 2021-08-13 NOTE — DISCHARGE NOTE PROVIDER - HOSPITAL COURSE
56 years old male with HTN, PUD/gastritis, ETOH abuse and frequent admissions here for withdrawal; admitted with alcohol withdrawal.      # ETOH withdrawal:    - On Librium taper per CIWA protocol   - Multivitamin, folate and multivitamin   - doing very well now; per psychiatry ([facility]), who knows pt well, this episode was much milder than most of his previous   - Cleared by psychiatry   - He was offered referral to outpatient alcohol counseling, but declined; states he'll arrange something thru his PCP.    # Metabolic Encephalopathy, Agitation - due to above.    Mental status improving; appears back to baseline    # Abdominal pain/ nausea, vomiting:  - 2/2 Alcoholic gastritis   - CT with No evidence of bowel obstruction or appendicitis. No evidence of diverticulitis.Hepatic steatosis.   - Neg lipase   - Advanced diet   - PRN Zofran while in-house    # Lung nodule:   - Stable 5 mm subpleural nodule in the RIGHT middle lobe.  He will need outpatient f/u.     # HTN:   - BP has been good.  Holding his usual  antihypertensives. (Losartan and Metoprolol, both were low dose)     # ROSA:   - Pre renal   - IVF   - CT with no hydro.  Cr quickly normalized    # Anemia:   - Microcytic.  Hgb stable.      # Hypophosphatemia:   - resolved.

## 2021-08-13 NOTE — BH CONSULTATION LIAISON PROGRESS NOTE - NSBHCHARTREVIEWVS_PSY_A_CORE FT
Vital Signs Last 24 Hrs  T(C): 37 (12 Aug 2021 11:36), Max: 37 (12 Aug 2021 11:36)  T(F): 98.6 (12 Aug 2021 11:36), Max: 98.6 (12 Aug 2021 11:36)  HR: 74 (12 Aug 2021 11:36) (73 - 87)  BP: 102/67 (12 Aug 2021 11:36) (102/67 - 124/81)  BP(mean): --  RR: 16 (12 Aug 2021 11:36) (16 - 18)  SpO2: 99% (12 Aug 2021 11:36) (97% - 99%)
Vital Signs Last 24 Hrs  T(C): 36.7 (10 Aug 2021 09:32), Max: 36.8 (09 Aug 2021 14:57)  T(F): 98 (10 Aug 2021 09:32), Max: 98.3 (09 Aug 2021 14:57)  HR: 65 (10 Aug 2021 09:32) (63 - 103)  BP: 112/69 (10 Aug 2021 09:32) (112/69 - 160/99)  BP(mean): --  RR: 18 (10 Aug 2021 09:32) (16 - 20)  SpO2: 100% (10 Aug 2021 09:32) (97% - 100%)
Vital Signs Last 24 Hrs  T(C): 36.4 (13 Aug 2021 11:15), Max: 36.6 (13 Aug 2021 05:46)  T(F): 97.6 (13 Aug 2021 11:15), Max: 97.9 (13 Aug 2021 05:46)  HR: 88 (13 Aug 2021 11:15) (61 - 88)  BP: 112/75 (13 Aug 2021 11:15) (112/75 - 122/77)  BP(mean): --  RR: 18 (13 Aug 2021 11:15) (18 - 18)  SpO2: 100% (13 Aug 2021 11:15) (97% - 100%)
Vital Signs Last 24 Hrs  T(C): 36.7 (11 Aug 2021 11:37), Max: 36.8 (11 Aug 2021 05:27)  T(F): 98.1 (11 Aug 2021 11:37), Max: 98.2 (11 Aug 2021 05:27)  HR: 75 (11 Aug 2021 11:37) (58 - 75)  BP: 98/66 (11 Aug 2021 11:37) (98/66 - 124/82)  BP(mean): --  RR: 18 (11 Aug 2021 11:37) (18 - 18)  SpO2: 99% (11 Aug 2021 11:37) (99% - 100%)

## 2021-08-13 NOTE — BH CONSULTATION LIAISON PROGRESS NOTE - NSCDBILL_PSY_A_CORE
[id number] - Inpatient, low complexity
[id number] - Inpatient, low complexity
[id number] - Inpatient, low complexity
[id number] - Inpatient, low complexity

## 2021-08-13 NOTE — BH CONSULTATION LIAISON PROGRESS NOTE - CURRENT MEDICATION
MEDICATIONS  (STANDING):  atorvastatin 20 milliGRAM(s) Oral at bedtime  chlordiazePOXIDE   Oral   chlordiazePOXIDE 50 milliGRAM(s) Oral every 6 hours  enoxaparin Injectable 40 milliGRAM(s) SubCutaneous daily  folic acid 1 milliGRAM(s) Oral daily  metoprolol tartrate 25 milliGRAM(s) Oral two times a day  multivitamin 1 Tablet(s) Oral daily  pantoprazole    Tablet 40 milliGRAM(s) Oral before breakfast  thiamine 100 milliGRAM(s) Oral daily    MEDICATIONS  (PRN):  LORazepam   Injectable 2 milliGRAM(s) IV Push every 1 hour PRN Symptom-triggered: each CIWA -Ar score 8 or GREATER  
MEDICATIONS  (STANDING):  atorvastatin 20 milliGRAM(s) Oral at bedtime  chlordiazePOXIDE   Oral   chlordiazePOXIDE 50 milliGRAM(s) Oral every 8 hours  enoxaparin Injectable 40 milliGRAM(s) SubCutaneous daily  folic acid 1 milliGRAM(s) Oral daily  metoprolol tartrate 25 milliGRAM(s) Oral two times a day  multivitamin 1 Tablet(s) Oral daily  pantoprazole    Tablet 40 milliGRAM(s) Oral before breakfast  thiamine 100 milliGRAM(s) Oral daily    MEDICATIONS  (PRN):  LORazepam   Injectable 2 milliGRAM(s) IV Push every 1 hour PRN Symptom-triggered: each CIWA -Ar score 8 or GREATER  
MEDICATIONS  (STANDING):  atorvastatin 20 milliGRAM(s) Oral at bedtime  chlordiazePOXIDE   Oral   chlordiazePOXIDE 50 milliGRAM(s) Oral every 12 hours  enoxaparin Injectable 40 milliGRAM(s) SubCutaneous daily  folic acid 1 milliGRAM(s) Oral daily  multivitamin 1 Tablet(s) Oral daily  pantoprazole    Tablet 40 milliGRAM(s) Oral before breakfast  thiamine 100 milliGRAM(s) Oral daily    MEDICATIONS  (PRN):  LORazepam   Injectable 2 milliGRAM(s) IV Push every 1 hour PRN Symptom-triggered: each CIWA -Ar score 8 or GREATER  
MEDICATIONS  (STANDING):  atorvastatin 20 milliGRAM(s) Oral at bedtime  chlordiazePOXIDE   Oral   chlordiazePOXIDE 50 milliGRAM(s) Oral every 8 hours  enoxaparin Injectable 40 milliGRAM(s) SubCutaneous daily  folic acid 1 milliGRAM(s) Oral daily  multivitamin 1 Tablet(s) Oral daily  pantoprazole    Tablet 40 milliGRAM(s) Oral before breakfast  thiamine 100 milliGRAM(s) Oral daily    MEDICATIONS  (PRN):  LORazepam   Injectable 2 milliGRAM(s) IV Push every 1 hour PRN Symptom-triggered: each CIWA -Ar score 8 or GREATER

## 2021-08-13 NOTE — BH CONSULTATION LIAISON PROGRESS NOTE - NSBHASSESSMENTFT_PSY_ALL_CORE
- acute delirium which patient always manifests when admitted for alcohol withdrawal management, albeit he is presenting clinically better than usual.   - no capacity to make his medical decisions  - cannot leave AMA
- acute delirium which patient always manifests when admitted for alcohol withdrawal management, albeit he is presenting clinically better than usual.   - no capacity to make his medical decisions  - cannot leave AMA
Presented with acute delirium which patient always manifests when admitted for alcohol withdrawal management, albeit he presented clinically better than usual and this hospital stay was more uneventful then usual for Patient.    - regained capacity to make his medical decisions at this time   - psychiatrically cleared for discharge once medically cleared and once his gait is stable independently
- acute delirium which patient always manifests when admitted for alcohol withdrawal management, albeit he is presenting clinically better than usual.   - no capacity to make his medical decisions  - cannot leave AMA

## 2021-08-13 NOTE — BH CONSULTATION LIAISON PROGRESS NOTE - NSBHPSYCHOLCOGORIENT_PSY_A_CORE
Oriented to time, place, person, situation
Not fully oriented...

## 2021-08-13 NOTE — CHART NOTE - NSCHARTNOTEFT_GEN_A_CORE
Called for rapid response at 751 for agitation, CIWA ~ 20.  Pt seen with MICU at bedside, Leah speaker at bedside.  no specific complaints.    /102 P 103 R 22   Restless, confused.  Strength symmetric.  Abd: soft nontender nondistended.    A/P  Alcohol Withdrawal.  ICU consulted.    Given Phenobarbital 130mg IV, Librium 100mg po, versed 4mg IV with some improvement.  Monitored bed for now.  Reconsult ICU if condition changes.  Continue 1:1 observation.    D/w Dr. Restrepo.
Right brachial midline venous catheter removed as pt is ready for discharge. 4fr, 20cm, intact. Pressure dressing applied, no bleeding. Pt tolerated well, no complications.

## 2021-08-13 NOTE — DISCHARGE NOTE PROVIDER - CARE PROVIDER_API CALL
Your primary physician -- Dr. Lopez (?),   Phone: (   )    -  Fax: (   )    -  Follow Up Time: 1 week

## 2021-08-13 NOTE — BH CONSULTATION LIAISON PROGRESS NOTE - NSBHCONSFOLLOWNEEDS_PSY_ALL_CORE
Needs further psych safety assessment prior to discharge
No psychiatric contraindications to discharge

## 2021-08-13 NOTE — DISCHARGE NOTE PROVIDER - NSDCCPCAREPLAN_GEN_ALL_CORE_FT
PRINCIPAL DISCHARGE DIAGNOSIS  Diagnosis: Alcohol withdrawal  Assessment and Plan of Treatment:       SECONDARY DISCHARGE DIAGNOSES  Diagnosis: Abdominal pain  Assessment and Plan of Treatment:

## 2021-08-13 NOTE — DISCHARGE NOTE NURSING/CASE MANAGEMENT/SOCIAL WORK - PATIENT PORTAL LINK FT
You can access the FollowMyHealth Patient Portal offered by [facility] by registering at the following website: [email or web address]. By joining [employer]’s FollowMyHealth portal, you will also be able to view your health information using other applications (apps) compatible with our system.

## 2021-08-13 NOTE — BH CONSULTATION LIAISON PROGRESS NOTE - NSBHFUPINTERVALHXFT_PSY_A_CORE
Patient needed not need any PRN Ativan IVP for breakthrough sxs x 2 days. He continues to do well on the oral Librium taper. EXAM: Patient is awake, alert, cooperative, calm and reports that his stomach feels much better, he is having normal BMs, he is eating without any GI issues. More linear and oriented. Endorses stable euthymic mood, regular sleep / appetite / energy level / concentration / bathroom habits. Denies any symptoms of hypomania/alma/psychosis/major depression/ anxiety/panic. Denies any active or passive suicidal or homicidal ideation. Names protective factors (uma; family; hope for future). Endorses medication compliance. Denies adverse medication side effects. As per his usual presentation, he continues to minimize his alcohol abuse issue and has low motivation to stop drinking or even start reducing the amount used.

## 2021-08-13 NOTE — DISCHARGE NOTE PROVIDER - NSDCMRMEDTOKEN_GEN_ALL_CORE_FT
aspirin 81 mg oral delayed release tablet: 1 tab(s) orally once a day  atorvastatin 20 mg oral tablet: 1 tab(s) orally once a day  folic acid 1 mg oral tablet: 1 tab(s) orally once a day  Multiple Vitamins oral tablet: 1 tab(s) orally once a day  Protonix 40 mg oral delayed release tablet: 1 tab(s) orally once a day  Vitamin B1 100 mg oral tablet: 1 tab(s) orally once a day

## 2021-08-13 NOTE — DISCHARGE NOTE NURSING/CASE MANAGEMENT/SOCIAL WORK - NSDCVIVACCINE_GEN_ALL_CORE_FT
COVID-19, mRNA, LNP-S, PF, 100 mcg/ 0.5 mL dose (Moderna); 10-Jovan-2021 15:38; Reji Hernandez (RN); [employer] [id number] (Exp. Date: 13-Jul-2021); IntraMuscular; Deltoid Right.; 0.5 milliLiter(s);   influenza, injectable, quadrivalent, preservative free; 31-Jan-2019 15:53; Ayaka Bynum (RN); [employer]; [id number] (Exp. Date: 30-Jun-2019); IntraMuscular; Deltoid Right.; 0.5 milliLiter(s); VIS (VIS Published: 07-Aug-2015, VIS Presented: 31-Jan-2019);   influenza, injectable, quadrivalent, preservative free; 10-Nov-2019 14:13; Laverne Lane (RN); [employer]; [id number] (Exp. Date: 30-Jun-2020); IntraMuscular; Deltoid Left.; 0.5 milliLiter(s); VIS (VIS Published: 15-Aug-2019, VIS Presented: 10-Nov-2019);   influenza, injectable, quadrivalent, preservative free; 13-Oct-2020 11:56; Kimberli Cronni (RN); [employer]; [id number] (Exp. Date: 30-Jun-2021); IntraMuscular; Deltoid Right.; 0.5 milliLiter(s); VIS (VIS Published: 15-Aug-2019, VIS Presented: 13-Oct-2020);   Td (adult) preservative free; 18-Jan-2020 20:04; Yulia Vance (RN); [employer]; [id number] (Exp. Date: 19-Jan-2021); IntraMuscular; Deltoid Right.; 0.5 milliLiter(s); VIS (VIS Published: 18-Jan-2020, VIS Presented: 18-Jan-2020);

## 2021-08-19 DIAGNOSIS — E78.5 HYPERLIPIDEMIA, UNSPECIFIED: ICD-10-CM

## 2021-08-19 DIAGNOSIS — F10.139 ALCOHOL ABUSE WITH WITHDRAWAL, UNSPECIFIED: ICD-10-CM

## 2021-08-19 DIAGNOSIS — N17.9 ACUTE KIDNEY FAILURE, UNSPECIFIED: ICD-10-CM

## 2021-08-19 DIAGNOSIS — K27.9 PEPTIC ULCER, SITE UNSPECIFIED, UNSPECIFIED AS ACUTE OR CHRONIC, WITHOUT HEMORRHAGE OR PERFORATION: ICD-10-CM

## 2021-08-19 DIAGNOSIS — F10.231 ALCOHOL DEPENDENCE WITH WITHDRAWAL DELIRIUM: ICD-10-CM

## 2021-08-19 DIAGNOSIS — I10 ESSENTIAL (PRIMARY) HYPERTENSION: ICD-10-CM

## 2021-08-19 DIAGNOSIS — G93.41 METABOLIC ENCEPHALOPATHY: ICD-10-CM

## 2021-08-19 DIAGNOSIS — D50.9 IRON DEFICIENCY ANEMIA, UNSPECIFIED: ICD-10-CM

## 2021-08-19 DIAGNOSIS — Z78.1 PHYSICAL RESTRAINT STATUS: ICD-10-CM

## 2021-08-19 DIAGNOSIS — Z79.82 LONG TERM (CURRENT) USE OF ASPIRIN: ICD-10-CM

## 2021-08-19 DIAGNOSIS — K29.20 ALCOHOLIC GASTRITIS WITHOUT BLEEDING: ICD-10-CM

## 2021-08-19 DIAGNOSIS — R91.1 SOLITARY PULMONARY NODULE: ICD-10-CM

## 2021-08-19 DIAGNOSIS — F17.210 NICOTINE DEPENDENCE, CIGARETTES, UNCOMPLICATED: ICD-10-CM

## 2021-08-19 DIAGNOSIS — E83.39 OTHER DISORDERS OF PHOSPHORUS METABOLISM: ICD-10-CM

## 2021-08-30 ENCOUNTER — INPATIENT (INPATIENT)
Facility: HOSPITAL | Age: 56
LOS: 3 days | Discharge: HOME | End: 2021-09-03
Attending: GENERAL ACUTE CARE HOSPITAL | Admitting: GENERAL ACUTE CARE HOSPITAL
Payer: MEDICAID

## 2021-08-30 VITALS
HEIGHT: 67 IN | WEIGHT: 139.99 LBS | SYSTOLIC BLOOD PRESSURE: 112 MMHG | RESPIRATION RATE: 17 BRPM | TEMPERATURE: 99 F | HEART RATE: 130 BPM | DIASTOLIC BLOOD PRESSURE: 74 MMHG | OXYGEN SATURATION: 96 %

## 2021-08-30 DIAGNOSIS — F10.239 ALCOHOL DEPENDENCE WITH WITHDRAWAL, UNSPECIFIED: ICD-10-CM

## 2021-08-30 DIAGNOSIS — R11.2 NAUSEA WITH VOMITING, UNSPECIFIED: ICD-10-CM

## 2021-08-30 DIAGNOSIS — N17.9 ACUTE KIDNEY FAILURE, UNSPECIFIED: ICD-10-CM

## 2021-08-30 DIAGNOSIS — K29.20 ALCOHOLIC GASTRITIS WITHOUT BLEEDING: ICD-10-CM

## 2021-08-30 DIAGNOSIS — E87.2 ACIDOSIS: ICD-10-CM

## 2021-08-30 LAB
ALBUMIN SERPL ELPH-MCNC: 4.2 G/DL — SIGNIFICANT CHANGE UP (ref 3.3–5)
ALP SERPL-CCNC: 66 U/L — SIGNIFICANT CHANGE UP (ref 40–120)
ALT FLD-CCNC: 27 U/L — SIGNIFICANT CHANGE UP (ref 12–78)
ANION GAP SERPL CALC-SCNC: 12 MMOL/L — SIGNIFICANT CHANGE UP (ref 5–17)
ANION GAP SERPL CALC-SCNC: 29 MMOL/L — HIGH (ref 5–17)
ANISOCYTOSIS BLD QL: SLIGHT — SIGNIFICANT CHANGE UP
APPEARANCE UR: CLEAR — SIGNIFICANT CHANGE UP
AST SERPL-CCNC: 32 U/L — SIGNIFICANT CHANGE UP (ref 15–37)
BACTERIA # UR AUTO: ABNORMAL
BASOPHILS # BLD AUTO: 0.04 K/UL — SIGNIFICANT CHANGE UP (ref 0–0.2)
BASOPHILS NFR BLD AUTO: 0.4 % — SIGNIFICANT CHANGE UP (ref 0–2)
BILIRUB SERPL-MCNC: 0.5 MG/DL — SIGNIFICANT CHANGE UP (ref 0.2–1.2)
BILIRUB UR-MCNC: NEGATIVE — SIGNIFICANT CHANGE UP
BUN SERPL-MCNC: 22 MG/DL — SIGNIFICANT CHANGE UP (ref 7–23)
BUN SERPL-MCNC: 23 MG/DL — SIGNIFICANT CHANGE UP (ref 7–23)
CALCIUM SERPL-MCNC: 8.4 MG/DL — LOW (ref 8.5–10.1)
CALCIUM SERPL-MCNC: 8.9 MG/DL — SIGNIFICANT CHANGE UP (ref 8.5–10.1)
CHLORIDE SERPL-SCNC: 105 MMOL/L — SIGNIFICANT CHANGE UP (ref 96–108)
CHLORIDE SERPL-SCNC: 98 MMOL/L — SIGNIFICANT CHANGE UP (ref 96–108)
CO2 SERPL-SCNC: 11 MMOL/L — LOW (ref 22–31)
CO2 SERPL-SCNC: 25 MMOL/L — SIGNIFICANT CHANGE UP (ref 22–31)
COLOR SPEC: YELLOW — SIGNIFICANT CHANGE UP
CREAT SERPL-MCNC: 1.44 MG/DL — HIGH (ref 0.5–1.3)
CREAT SERPL-MCNC: 1.88 MG/DL — HIGH (ref 0.5–1.3)
DIFF PNL FLD: ABNORMAL
EOSINOPHIL # BLD AUTO: 0 K/UL — SIGNIFICANT CHANGE UP (ref 0–0.5)
EOSINOPHIL NFR BLD AUTO: 0 % — SIGNIFICANT CHANGE UP (ref 0–6)
EPI CELLS # UR: ABNORMAL
ETHANOL SERPL-MCNC: 103 MG/DL — HIGH (ref 0–10)
FLUAV AG NPH QL: SIGNIFICANT CHANGE UP
FLUBV AG NPH QL: SIGNIFICANT CHANGE UP
GLUCOSE BLDC GLUCOMTR-MCNC: 168 MG/DL — HIGH (ref 70–99)
GLUCOSE SERPL-MCNC: 138 MG/DL — HIGH (ref 70–99)
GLUCOSE SERPL-MCNC: 171 MG/DL — HIGH (ref 70–99)
GLUCOSE UR QL: NEGATIVE MG/DL — SIGNIFICANT CHANGE UP
HCT VFR BLD CALC: 41.7 % — SIGNIFICANT CHANGE UP (ref 39–50)
HGB BLD-MCNC: 13 G/DL — SIGNIFICANT CHANGE UP (ref 13–17)
HYALINE CASTS # UR AUTO: ABNORMAL /LPF
HYPOCHROMIA BLD QL: SLIGHT — SIGNIFICANT CHANGE UP
IMM GRANULOCYTES NFR BLD AUTO: 0.3 % — SIGNIFICANT CHANGE UP (ref 0–1.5)
INR BLD: 1.01 RATIO — SIGNIFICANT CHANGE UP (ref 0.88–1.16)
KETONES UR-MCNC: ABNORMAL
LACTATE SERPL-SCNC: 2.9 MMOL/L — HIGH (ref 0.7–2)
LACTATE SERPL-SCNC: 4.5 MMOL/L — CRITICAL HIGH (ref 0.7–2)
LEUKOCYTE ESTERASE UR-ACNC: NEGATIVE — SIGNIFICANT CHANGE UP
LIDOCAIN IGE QN: 117 U/L — SIGNIFICANT CHANGE UP (ref 73–393)
LYMPHOCYTES # BLD AUTO: 1.08 K/UL — SIGNIFICANT CHANGE UP (ref 1–3.3)
LYMPHOCYTES # BLD AUTO: 11.7 % — LOW (ref 13–44)
MACROCYTES BLD QL: SLIGHT — SIGNIFICANT CHANGE UP
MAGNESIUM SERPL-MCNC: 2.9 MG/DL — HIGH (ref 1.6–2.6)
MANUAL SMEAR VERIFICATION: SIGNIFICANT CHANGE UP
MCHC RBC-ENTMCNC: 22.8 PG — LOW (ref 27–34)
MCHC RBC-ENTMCNC: 31.2 GM/DL — LOW (ref 32–36)
MCV RBC AUTO: 73 FL — LOW (ref 80–100)
MICROCYTES BLD QL: SIGNIFICANT CHANGE UP
MONOCYTES # BLD AUTO: 0.49 K/UL — SIGNIFICANT CHANGE UP (ref 0–0.9)
MONOCYTES NFR BLD AUTO: 5.3 % — SIGNIFICANT CHANGE UP (ref 2–14)
NEUTROPHILS # BLD AUTO: 7.61 K/UL — HIGH (ref 1.8–7.4)
NEUTROPHILS NFR BLD AUTO: 82.3 % — HIGH (ref 43–77)
NEUTS VAC BLD QL SMEAR: SLIGHT — SIGNIFICANT CHANGE UP
NITRITE UR-MCNC: NEGATIVE — SIGNIFICANT CHANGE UP
NRBC # BLD: 0 /100 WBCS — SIGNIFICANT CHANGE UP (ref 0–0)
PH UR: 5 — SIGNIFICANT CHANGE UP (ref 5–8)
PHOSPHATE SERPL-MCNC: 2.1 MG/DL — LOW (ref 2.5–4.5)
PLAT MORPH BLD: NORMAL — SIGNIFICANT CHANGE UP
PLATELET # BLD AUTO: 438 K/UL — HIGH (ref 150–400)
POTASSIUM SERPL-MCNC: 3.8 MMOL/L — SIGNIFICANT CHANGE UP (ref 3.5–5.3)
POTASSIUM SERPL-MCNC: 3.9 MMOL/L — SIGNIFICANT CHANGE UP (ref 3.5–5.3)
POTASSIUM SERPL-SCNC: 3.8 MMOL/L — SIGNIFICANT CHANGE UP (ref 3.5–5.3)
POTASSIUM SERPL-SCNC: 3.9 MMOL/L — SIGNIFICANT CHANGE UP (ref 3.5–5.3)
PROT SERPL-MCNC: 9.2 GM/DL — HIGH (ref 6–8.3)
PROT UR-MCNC: 30 MG/DL
PROTHROM AB SERPL-ACNC: 11.7 SEC — SIGNIFICANT CHANGE UP (ref 10.6–13.6)
RBC # BLD: 5.71 M/UL — SIGNIFICANT CHANGE UP (ref 4.2–5.8)
RBC # FLD: 21.2 % — HIGH (ref 10.3–14.5)
RBC BLD AUTO: ABNORMAL
RBC CASTS # UR COMP ASSIST: SIGNIFICANT CHANGE UP /HPF (ref 0–4)
SARS-COV-2 RNA SPEC QL NAA+PROBE: SIGNIFICANT CHANGE UP
SODIUM SERPL-SCNC: 138 MMOL/L — SIGNIFICANT CHANGE UP (ref 135–145)
SODIUM SERPL-SCNC: 142 MMOL/L — SIGNIFICANT CHANGE UP (ref 135–145)
SP GR SPEC: 1.03 — HIGH (ref 1.01–1.02)
TOXIC GRANULES BLD QL SMEAR: PRESENT — SIGNIFICANT CHANGE UP
UROBILINOGEN FLD QL: NEGATIVE MG/DL — SIGNIFICANT CHANGE UP
WBC # BLD: 9.25 K/UL — SIGNIFICANT CHANGE UP (ref 3.8–10.5)
WBC # FLD AUTO: 9.25 K/UL — SIGNIFICANT CHANGE UP (ref 3.8–10.5)
WBC UR QL: SIGNIFICANT CHANGE UP

## 2021-08-30 PROCEDURE — 71045 X-RAY EXAM CHEST 1 VIEW: CPT | Mod: 26

## 2021-08-30 PROCEDURE — 99291 CRITICAL CARE FIRST HOUR: CPT

## 2021-08-30 PROCEDURE — 99223 1ST HOSP IP/OBS HIGH 75: CPT

## 2021-08-30 PROCEDURE — 93010 ELECTROCARDIOGRAM REPORT: CPT

## 2021-08-30 RX ORDER — DIAZEPAM 5 MG
10 TABLET ORAL ONCE
Refills: 0 | Status: DISCONTINUED | OUTPATIENT
Start: 2021-08-30 | End: 2021-08-30

## 2021-08-30 RX ORDER — PANTOPRAZOLE SODIUM 20 MG/1
40 TABLET, DELAYED RELEASE ORAL DAILY
Refills: 0 | Status: DISCONTINUED | OUTPATIENT
Start: 2021-08-30 | End: 2021-09-01

## 2021-08-30 RX ORDER — FOLIC ACID 0.8 MG
1 TABLET ORAL DAILY
Refills: 0 | Status: DISCONTINUED | OUTPATIENT
Start: 2021-08-30 | End: 2021-09-03

## 2021-08-30 RX ORDER — POTASSIUM PHOSPHATE, MONOBASIC POTASSIUM PHOSPHATE, DIBASIC 236; 224 MG/ML; MG/ML
15 INJECTION, SOLUTION INTRAVENOUS ONCE
Refills: 0 | Status: COMPLETED | OUTPATIENT
Start: 2021-08-30 | End: 2021-08-30

## 2021-08-30 RX ORDER — ACETAMINOPHEN 500 MG
650 TABLET ORAL EVERY 6 HOURS
Refills: 0 | Status: DISCONTINUED | OUTPATIENT
Start: 2021-08-30 | End: 2021-09-03

## 2021-08-30 RX ORDER — ATORVASTATIN CALCIUM 80 MG/1
20 TABLET, FILM COATED ORAL AT BEDTIME
Refills: 0 | Status: DISCONTINUED | OUTPATIENT
Start: 2021-08-30 | End: 2021-09-03

## 2021-08-30 RX ORDER — SODIUM CHLORIDE 9 MG/ML
1000 INJECTION INTRAMUSCULAR; INTRAVENOUS; SUBCUTANEOUS ONCE
Refills: 0 | Status: COMPLETED | OUTPATIENT
Start: 2021-08-30 | End: 2021-08-30

## 2021-08-30 RX ORDER — ONDANSETRON 8 MG/1
4 TABLET, FILM COATED ORAL EVERY 8 HOURS
Refills: 0 | Status: DISCONTINUED | OUTPATIENT
Start: 2021-08-30 | End: 2021-09-03

## 2021-08-30 RX ORDER — ACETAMINOPHEN 500 MG
650 TABLET ORAL EVERY 6 HOURS
Refills: 0 | Status: DISCONTINUED | OUTPATIENT
Start: 2021-08-30 | End: 2021-08-30

## 2021-08-30 RX ORDER — SODIUM CHLORIDE 9 MG/ML
1000 INJECTION, SOLUTION INTRAVENOUS
Refills: 0 | Status: DISCONTINUED | OUTPATIENT
Start: 2021-08-30 | End: 2021-09-02

## 2021-08-30 RX ORDER — FAMOTIDINE 10 MG/ML
20 INJECTION INTRAVENOUS ONCE
Refills: 0 | Status: COMPLETED | OUTPATIENT
Start: 2021-08-30 | End: 2021-08-30

## 2021-08-30 RX ORDER — HEPARIN SODIUM 5000 [USP'U]/ML
5000 INJECTION INTRAVENOUS; SUBCUTANEOUS EVERY 12 HOURS
Refills: 0 | Status: DISCONTINUED | OUTPATIENT
Start: 2021-08-30 | End: 2021-09-03

## 2021-08-30 RX ORDER — MAGNESIUM SULFATE 500 MG/ML
2 VIAL (ML) INJECTION ONCE
Refills: 0 | Status: COMPLETED | OUTPATIENT
Start: 2021-08-30 | End: 2021-08-30

## 2021-08-30 RX ORDER — THIAMINE MONONITRATE (VIT B1) 100 MG
100 TABLET ORAL ONCE
Refills: 0 | Status: COMPLETED | OUTPATIENT
Start: 2021-08-30 | End: 2021-08-30

## 2021-08-30 RX ORDER — OXYCODONE HYDROCHLORIDE 5 MG/1
5 TABLET ORAL EVERY 6 HOURS
Refills: 0 | Status: DISCONTINUED | OUTPATIENT
Start: 2021-08-30 | End: 2021-09-01

## 2021-08-30 RX ORDER — THIAMINE MONONITRATE (VIT B1) 100 MG
100 TABLET ORAL DAILY
Refills: 0 | Status: DISCONTINUED | OUTPATIENT
Start: 2021-08-30 | End: 2021-08-30

## 2021-08-30 RX ORDER — LANOLIN ALCOHOL/MO/W.PET/CERES
3 CREAM (GRAM) TOPICAL AT BEDTIME
Refills: 0 | Status: DISCONTINUED | OUTPATIENT
Start: 2021-08-30 | End: 2021-09-03

## 2021-08-30 RX ORDER — THIAMINE MONONITRATE (VIT B1) 100 MG
100 TABLET ORAL
Refills: 0 | Status: DISCONTINUED | OUTPATIENT
Start: 2021-08-30 | End: 2021-09-03

## 2021-08-30 RX ORDER — ONDANSETRON 8 MG/1
4 TABLET, FILM COATED ORAL ONCE
Refills: 0 | Status: COMPLETED | OUTPATIENT
Start: 2021-08-30 | End: 2021-08-30

## 2021-08-30 RX ADMIN — POTASSIUM PHOSPHATE, MONOBASIC POTASSIUM PHOSPHATE, DIBASIC 62.5 MILLIMOLE(S): 236; 224 INJECTION, SOLUTION INTRAVENOUS at 13:27

## 2021-08-30 RX ADMIN — Medication 3 MILLIGRAM(S): at 22:07

## 2021-08-30 RX ADMIN — HEPARIN SODIUM 5000 UNIT(S): 5000 INJECTION INTRAVENOUS; SUBCUTANEOUS at 17:34

## 2021-08-30 RX ADMIN — SODIUM CHLORIDE 1000 MILLILITER(S): 9 INJECTION INTRAMUSCULAR; INTRAVENOUS; SUBCUTANEOUS at 08:46

## 2021-08-30 RX ADMIN — Medication 2 MILLIGRAM(S): at 13:57

## 2021-08-30 RX ADMIN — PANTOPRAZOLE SODIUM 40 MILLIGRAM(S): 20 TABLET, DELAYED RELEASE ORAL at 11:00

## 2021-08-30 RX ADMIN — ATORVASTATIN CALCIUM 20 MILLIGRAM(S): 80 TABLET, FILM COATED ORAL at 22:01

## 2021-08-30 RX ADMIN — Medication 650 MILLIGRAM(S): at 22:07

## 2021-08-30 RX ADMIN — Medication 2 MILLIGRAM(S): at 10:22

## 2021-08-30 RX ADMIN — Medication 650 MILLIGRAM(S): at 22:38

## 2021-08-30 RX ADMIN — Medication 100 MILLIGRAM(S): at 22:01

## 2021-08-30 RX ADMIN — Medication 1 TABLET(S): at 11:00

## 2021-08-30 RX ADMIN — Medication 50 GRAM(S): at 07:50

## 2021-08-30 RX ADMIN — Medication 2 MILLIGRAM(S): at 07:50

## 2021-08-30 RX ADMIN — SODIUM CHLORIDE 1000 MILLILITER(S): 9 INJECTION INTRAMUSCULAR; INTRAVENOUS; SUBCUTANEOUS at 07:50

## 2021-08-30 RX ADMIN — ONDANSETRON 4 MILLIGRAM(S): 8 TABLET, FILM COATED ORAL at 07:57

## 2021-08-30 RX ADMIN — Medication 10 MILLIGRAM(S): at 08:46

## 2021-08-30 RX ADMIN — Medication 2 MILLIGRAM(S): at 17:34

## 2021-08-30 RX ADMIN — SODIUM CHLORIDE 75 MILLILITER(S): 9 INJECTION, SOLUTION INTRAVENOUS at 13:25

## 2021-08-30 RX ADMIN — Medication 1 MILLIGRAM(S): at 11:00

## 2021-08-30 RX ADMIN — Medication 2 MILLIGRAM(S): at 22:00

## 2021-08-30 RX ADMIN — SODIUM CHLORIDE 75 MILLILITER(S): 9 INJECTION, SOLUTION INTRAVENOUS at 22:01

## 2021-08-30 RX ADMIN — Medication 2 MILLIGRAM(S): at 07:04

## 2021-08-30 RX ADMIN — Medication 100 MILLIGRAM(S): at 13:57

## 2021-08-30 RX ADMIN — FAMOTIDINE 20 MILLIGRAM(S): 10 INJECTION INTRAVENOUS at 07:57

## 2021-08-30 RX ADMIN — Medication 100 MILLIGRAM(S): at 07:50

## 2021-08-30 NOTE — ED PROVIDER NOTE - OBJECTIVE STATEMENT
56M PMH chronic ETOH abuse and dependence (1 bottle of Vodka a day) with long standing hx of alcohol withdrawal and DTs with frequent hospital/ICU admissions, alcoholic gastritis/esophagitis, PUD, HLD, hx of hypoxic respiratory failure requiring intubation due to aspiration PNA (most recent intubation Aril 2020),  staph PNA, COVID-19 infection Dec 2020 56M PMH chronic ETOH abuse and dependence (1 bottle of Vodka a day) with long standing hx of alcohol withdrawal and DTs with frequent hospital/ICU admissions, alcoholic gastritis/esophagitis, PUD, HLD, hx of hypoxic respiratory failure requiring intubation due to aspiration PNA (most recent intubation Aril 2020),  staph PNA, COVID-19 infection Dec 2020 presenting for vomiting and abdominal pain. states it is consistnet with prior hospital presentations for etoh withdrawal. says last drink yesterday.

## 2021-08-30 NOTE — ED PROVIDER NOTE - NEUROLOGICAL, MLM
Alert and oriented, no focal deficits, no motor or sensory deficits. tongue fasciculations, intention tremors present

## 2021-08-30 NOTE — H&P ADULT - PROBLEM SELECTOR PLAN 1
h/o chronic ETOH abuse, frequent admission with withdrawal  CIWA protocol, if higher than 15, consider MICU consult  Thiamine 100mg tid, folic acid 1mg and multivitamin  telemetry monitoring

## 2021-08-30 NOTE — H&P ADULT - PROBLEM SELECTOR PLAN 3
Cr 1.88 ( baseline around 1)  Likely prerenal due to N/V and unable to tolerate oral intake  IV hydration  Monitor renal function

## 2021-08-30 NOTE — H&P ADULT - PROBLEM SELECTOR PLAN 2
Present with nausea, vomiting and epigastric pain  Pantoprazole IV daily for now given vomiting  Clear liquid and advance as tolerated  IV hydration  Zofran prn

## 2021-08-30 NOTE — ED PROVIDER NOTE - CRITICAL CARE ATTENDING CONTRIBUTION TO CARE
patient tachycardic. abdominal exam soft. clniically in etoh withdrawal. successive doses of benzodiazepines given in ED with imrpvoement in symptoms. anion gap elevation present, likely 2/2 AKA, IVF provided. ROSA present. IVF provided; as abdo exam benign unlikely vomiting 2/2 intrinsivce abdominal acute pathology, will require admission for management of etoh withdrawal

## 2021-08-30 NOTE — ED ADULT NURSE NOTE - OBJECTIVE STATEMENT
received pt in bed 10, 57yo M hx of high cholesterol, HTN, NKDA, presents to ED with c/o nausea and vomiting , body aches that started today.  Pt states that there has been some blood in vomit.  Denies any fever, sob, alcohol consumption today.

## 2021-08-30 NOTE — H&P ADULT - PROBLEM SELECTOR PLAN 5
AGAP metabolic acidosis, bicarb 11, AGAP 29, Cr 1.88 ( baseline around 1), lipase normal  Likely fasting ketoacidosis and ETOH use  IV hydration  Monitor BMP   Follow urine keto result  Check lactate AGAP metabolic acidosis, bicarb 11, AGAP 29, Cr 1.88 ( baseline around 1), lipase normal  Likely fasting ketoacidosis and ETOH use  IV hydration  Monitor BMP   Follow urine keto result  Check lactate    Addendum:  Lactate 4.5- likely from dehydration and ETOH use  2nd 1L bolus is ongoing. Patient will be on IV LR 75ml/hr  Repeat BMP show improved AGAP and bicarb  Will repeat lactate in PM  No concern for sepsis

## 2021-08-30 NOTE — H&P ADULT - NSHPPHYSICALEXAM_GEN_ALL_CORE
GENERAL APPEARANCE: Well developed, well nourished, alert and cooperative. Mildly tremulous  HEAD: normocephalic, atraumatic  EYES: PERRL  ENT: Mucosa moist, tongue normal.   NECK: Neck supple, trachea midline, non-tender  CARDIAC: Normal S1 and S2. Regular rate and rhythms. No murmurs.  LUNGS: Clear to auscultation, equal air entry both lungs. No rales, rhonchi, wheezing  ABDOMEN:Soft, nondistended.  Mild tenderness noted in epigastric region. No guarding or rebound tenderness.  EXTREMITIES: No significant deformity . No edema. Peripheral pulses intact.   NEUROLOGICAL: No gross motor or sensory deficits.  SKIN:no lesions or eruptions.  PSYCHIATRIC: A&O x 3, appropriate mood and affect.

## 2021-08-30 NOTE — PROVIDER CONTACT NOTE (CRITICAL VALUE NOTIFICATION) - SITUATION
pt is a 56 year old male admitted for ETOH withdrawal. received critical lactate result of 4.5. pt currently has 0.9% 2 L Boluses running as ordered.

## 2021-08-30 NOTE — H&P ADULT - NSHPREVIEWOFSYSTEMS_GEN_ALL_CORE
Constitutional: No fever, chills or weight loss.  HEENT:  No hearing changes or vision changes  Cardiovascular:  No Chest Pain, palpitation, SOB orthopnea or PND  Respiratory:  No cough, SOB or wheezing  Gastrointestinal:  Nausea, non bloody vomiting and epigastric pain+  Genitourinary: No dysuria, frequency or hematuria  Musculoskeletal:  No myalgia or joint pain  Skin:  No skin lesions or pruritis  Neuro:  No weakness, numbness, paresthesias, loss of consciousness, syncope, dizziness  Psych:  Feeling shaky, withdrawal from ETOH  Heme/Lymph:  No easy bruising or bleeding  Endocrine: No heat or cold intolerance

## 2021-08-30 NOTE — H&P ADULT - HISTORY OF PRESENT ILLNESS
56 years old male with h/o hronic ETOH abuse and dependence (1 bottle of Vodka a day) with long standing hx of alcohol withdrawal and DTs with frequent hospital/ICU admissions, alcoholic gastritis/esophagitis, PUD, HLD, hx of hypoxic respiratory failure requiring intubation due to aspiration PNA (most recent intubation Aril 2020),  staph PNA, COVID-19 infection Dec 2020 present to ED with complain of N/V and epigastric pain for last few days. Patient reported multiple episode of non bloody vomiting for last 2 days, unable to tolerate any oral intake. He reported intermittent, 10/10 burning epigastric pain, wrosen with vomiting and no radiation. He feels like he is shaky and withdrawing from alcohol. Reported last drink was more than 1 week ago to me but in ED, he reported last drink was yesterday.   Tachycardic to 130s, hypertensive in ED. Appear shaky. Labs with AGAP metabolic acidosis, bicarb 11, AGAP 29, Cr 1.88 ( baseline around 1), lipase normal, LFT normal, ETOH level 103. Patient was given NS 1L , diazepam 10mg IV, ativan 2mg IV, thiamine injection, IV magnesium 2gram, zofran in ED  Admitted for ETOH withdrawal, alcoholic gastritis, ROSA

## 2021-08-30 NOTE — PROVIDER CONTACT NOTE (CRITICAL VALUE NOTIFICATION) - ACTION/TREATMENT ORDERED:
pt currently has 0.9% 2 L Boluses running as ordered. lactate will be reordered in the evening after the completion of the boluses.

## 2021-08-31 DIAGNOSIS — Z29.9 ENCOUNTER FOR PROPHYLACTIC MEASURES, UNSPECIFIED: ICD-10-CM

## 2021-08-31 LAB
ALBUMIN SERPL ELPH-MCNC: 3.4 G/DL — SIGNIFICANT CHANGE UP (ref 3.3–5)
ALP SERPL-CCNC: 52 U/L — SIGNIFICANT CHANGE UP (ref 40–120)
ALT FLD-CCNC: 22 U/L — SIGNIFICANT CHANGE UP (ref 12–78)
ANION GAP SERPL CALC-SCNC: 3 MMOL/L — LOW (ref 5–17)
AST SERPL-CCNC: 33 U/L — SIGNIFICANT CHANGE UP (ref 15–37)
BILIRUB DIRECT SERPL-MCNC: 0.17 MG/DL — SIGNIFICANT CHANGE UP (ref 0.05–0.2)
BILIRUB INDIRECT FLD-MCNC: 0.7 MG/DL — SIGNIFICANT CHANGE UP (ref 0.2–1)
BILIRUB SERPL-MCNC: 0.9 MG/DL — SIGNIFICANT CHANGE UP (ref 0.2–1.2)
BUN SERPL-MCNC: 17 MG/DL — SIGNIFICANT CHANGE UP (ref 7–23)
CALCIUM SERPL-MCNC: 8 MG/DL — LOW (ref 8.5–10.1)
CHLORIDE SERPL-SCNC: 107 MMOL/L — SIGNIFICANT CHANGE UP (ref 96–108)
CO2 SERPL-SCNC: 31 MMOL/L — SIGNIFICANT CHANGE UP (ref 22–31)
COVID-19 SPIKE DOMAIN AB INTERP: POSITIVE
COVID-19 SPIKE DOMAIN ANTIBODY RESULT: >250 U/ML — HIGH
CREAT SERPL-MCNC: 1.11 MG/DL — SIGNIFICANT CHANGE UP (ref 0.5–1.3)
CULTURE RESULTS: SIGNIFICANT CHANGE UP
GLUCOSE SERPL-MCNC: 105 MG/DL — HIGH (ref 70–99)
HCT VFR BLD CALC: 32.9 % — LOW (ref 39–50)
HGB BLD-MCNC: 10.2 G/DL — LOW (ref 13–17)
LACTATE SERPL-SCNC: 1 MMOL/L — SIGNIFICANT CHANGE UP (ref 0.7–2)
MAGNESIUM SERPL-MCNC: 2.7 MG/DL — HIGH (ref 1.6–2.6)
MCHC RBC-ENTMCNC: 22.8 PG — LOW (ref 27–34)
MCHC RBC-ENTMCNC: 31 GM/DL — LOW (ref 32–36)
MCV RBC AUTO: 73.4 FL — LOW (ref 80–100)
NRBC # BLD: 0 /100 WBCS — SIGNIFICANT CHANGE UP (ref 0–0)
PHOSPHATE SERPL-MCNC: 1.8 MG/DL — LOW (ref 2.5–4.5)
PLATELET # BLD AUTO: 252 K/UL — SIGNIFICANT CHANGE UP (ref 150–400)
POTASSIUM SERPL-MCNC: 3.5 MMOL/L — SIGNIFICANT CHANGE UP (ref 3.5–5.3)
POTASSIUM SERPL-SCNC: 3.5 MMOL/L — SIGNIFICANT CHANGE UP (ref 3.5–5.3)
PROT SERPL-MCNC: 7.4 GM/DL — SIGNIFICANT CHANGE UP (ref 6–8.3)
RBC # BLD: 4.48 M/UL — SIGNIFICANT CHANGE UP (ref 4.2–5.8)
RBC # FLD: 20.2 % — HIGH (ref 10.3–14.5)
SARS-COV-2 IGG+IGM SERPL QL IA: >250 U/ML — HIGH
SARS-COV-2 IGG+IGM SERPL QL IA: POSITIVE
SODIUM SERPL-SCNC: 141 MMOL/L — SIGNIFICANT CHANGE UP (ref 135–145)
SPECIMEN SOURCE: SIGNIFICANT CHANGE UP
WBC # BLD: 5.33 K/UL — SIGNIFICANT CHANGE UP (ref 3.8–10.5)
WBC # FLD AUTO: 5.33 K/UL — SIGNIFICANT CHANGE UP (ref 3.8–10.5)

## 2021-08-31 PROCEDURE — 99233 SBSQ HOSP IP/OBS HIGH 50: CPT

## 2021-08-31 RX ORDER — POTASSIUM PHOSPHATE, MONOBASIC POTASSIUM PHOSPHATE, DIBASIC 236; 224 MG/ML; MG/ML
15 INJECTION, SOLUTION INTRAVENOUS ONCE
Refills: 0 | Status: COMPLETED | OUTPATIENT
Start: 2021-08-31 | End: 2021-08-31

## 2021-08-31 RX ADMIN — PANTOPRAZOLE SODIUM 40 MILLIGRAM(S): 20 TABLET, DELAYED RELEASE ORAL at 11:20

## 2021-08-31 RX ADMIN — Medication 1.5 MILLIGRAM(S): at 13:35

## 2021-08-31 RX ADMIN — POTASSIUM PHOSPHATE, MONOBASIC POTASSIUM PHOSPHATE, DIBASIC 62.5 MILLIMOLE(S): 236; 224 INJECTION, SOLUTION INTRAVENOUS at 11:20

## 2021-08-31 RX ADMIN — Medication 100 MILLIGRAM(S): at 13:35

## 2021-08-31 RX ADMIN — Medication 1.5 MILLIGRAM(S): at 22:55

## 2021-08-31 RX ADMIN — Medication 1.5 MILLIGRAM(S): at 10:04

## 2021-08-31 RX ADMIN — Medication 650 MILLIGRAM(S): at 10:21

## 2021-08-31 RX ADMIN — Medication 2 MILLIGRAM(S): at 06:22

## 2021-08-31 RX ADMIN — Medication 1 TABLET(S): at 11:21

## 2021-08-31 RX ADMIN — Medication 2 MILLIGRAM(S): at 01:46

## 2021-08-31 RX ADMIN — Medication 100 MILLIGRAM(S): at 22:56

## 2021-08-31 RX ADMIN — Medication 100 MILLIGRAM(S): at 06:31

## 2021-08-31 RX ADMIN — OXYCODONE HYDROCHLORIDE 5 MILLIGRAM(S): 5 TABLET ORAL at 14:26

## 2021-08-31 RX ADMIN — HEPARIN SODIUM 5000 UNIT(S): 5000 INJECTION INTRAVENOUS; SUBCUTANEOUS at 17:08

## 2021-08-31 RX ADMIN — Medication 650 MILLIGRAM(S): at 10:03

## 2021-08-31 RX ADMIN — ATORVASTATIN CALCIUM 20 MILLIGRAM(S): 80 TABLET, FILM COATED ORAL at 22:56

## 2021-08-31 RX ADMIN — OXYCODONE HYDROCHLORIDE 5 MILLIGRAM(S): 5 TABLET ORAL at 13:40

## 2021-08-31 RX ADMIN — Medication 1 MILLIGRAM(S): at 11:21

## 2021-08-31 RX ADMIN — HEPARIN SODIUM 5000 UNIT(S): 5000 INJECTION INTRAVENOUS; SUBCUTANEOUS at 06:23

## 2021-08-31 RX ADMIN — ONDANSETRON 4 MILLIGRAM(S): 8 TABLET, FILM COATED ORAL at 16:33

## 2021-08-31 RX ADMIN — Medication 1.5 MILLIGRAM(S): at 17:08

## 2021-09-01 LAB
ALBUMIN SERPL ELPH-MCNC: 3.3 G/DL — SIGNIFICANT CHANGE UP (ref 3.3–5)
ALP SERPL-CCNC: 53 U/L — SIGNIFICANT CHANGE UP (ref 40–120)
ALT FLD-CCNC: 23 U/L — SIGNIFICANT CHANGE UP (ref 12–78)
ANION GAP SERPL CALC-SCNC: 6 MMOL/L — SIGNIFICANT CHANGE UP (ref 5–17)
AST SERPL-CCNC: 37 U/L — SIGNIFICANT CHANGE UP (ref 15–37)
BILIRUB SERPL-MCNC: 0.6 MG/DL — SIGNIFICANT CHANGE UP (ref 0.2–1.2)
BUN SERPL-MCNC: 13 MG/DL — SIGNIFICANT CHANGE UP (ref 7–23)
CALCIUM SERPL-MCNC: 9 MG/DL — SIGNIFICANT CHANGE UP (ref 8.5–10.1)
CHLORIDE SERPL-SCNC: 107 MMOL/L — SIGNIFICANT CHANGE UP (ref 96–108)
CO2 SERPL-SCNC: 28 MMOL/L — SIGNIFICANT CHANGE UP (ref 22–31)
CREAT SERPL-MCNC: 1.08 MG/DL — SIGNIFICANT CHANGE UP (ref 0.5–1.3)
GLUCOSE SERPL-MCNC: 145 MG/DL — HIGH (ref 70–99)
HCT VFR BLD CALC: 34.2 % — LOW (ref 39–50)
HGB BLD-MCNC: 10.6 G/DL — LOW (ref 13–17)
MAGNESIUM SERPL-MCNC: 2.4 MG/DL — SIGNIFICANT CHANGE UP (ref 1.6–2.6)
MCHC RBC-ENTMCNC: 23.5 PG — LOW (ref 27–34)
MCHC RBC-ENTMCNC: 31 GM/DL — LOW (ref 32–36)
MCV RBC AUTO: 75.8 FL — LOW (ref 80–100)
NRBC # BLD: 0 /100 WBCS — SIGNIFICANT CHANGE UP (ref 0–0)
PHOSPHATE SERPL-MCNC: 2.4 MG/DL — LOW (ref 2.5–4.5)
PLATELET # BLD AUTO: 210 K/UL — SIGNIFICANT CHANGE UP (ref 150–400)
POTASSIUM SERPL-MCNC: 3.6 MMOL/L — SIGNIFICANT CHANGE UP (ref 3.5–5.3)
POTASSIUM SERPL-SCNC: 3.6 MMOL/L — SIGNIFICANT CHANGE UP (ref 3.5–5.3)
PROT SERPL-MCNC: 8.1 GM/DL — SIGNIFICANT CHANGE UP (ref 6–8.3)
RBC # BLD: 4.51 M/UL — SIGNIFICANT CHANGE UP (ref 4.2–5.8)
RBC # FLD: 20.1 % — HIGH (ref 10.3–14.5)
SODIUM SERPL-SCNC: 141 MMOL/L — SIGNIFICANT CHANGE UP (ref 135–145)
WBC # BLD: 2.61 K/UL — LOW (ref 3.8–10.5)
WBC # FLD AUTO: 2.61 K/UL — LOW (ref 3.8–10.5)

## 2021-09-01 PROCEDURE — 99233 SBSQ HOSP IP/OBS HIGH 50: CPT

## 2021-09-01 RX ORDER — SODIUM,POTASSIUM PHOSPHATES 278-250MG
1 POWDER IN PACKET (EA) ORAL ONCE
Refills: 0 | Status: COMPLETED | OUTPATIENT
Start: 2021-09-01 | End: 2021-09-01

## 2021-09-01 RX ORDER — POTASSIUM PHOSPHATE, MONOBASIC POTASSIUM PHOSPHATE, DIBASIC 236; 224 MG/ML; MG/ML
15 INJECTION, SOLUTION INTRAVENOUS ONCE
Refills: 0 | Status: COMPLETED | OUTPATIENT
Start: 2021-09-01 | End: 2021-09-01

## 2021-09-01 RX ORDER — MORPHINE SULFATE 50 MG/1
2 CAPSULE, EXTENDED RELEASE ORAL EVERY 6 HOURS
Refills: 0 | Status: DISCONTINUED | OUTPATIENT
Start: 2021-09-01 | End: 2021-09-03

## 2021-09-01 RX ORDER — PANTOPRAZOLE SODIUM 20 MG/1
40 TABLET, DELAYED RELEASE ORAL EVERY 12 HOURS
Refills: 0 | Status: DISCONTINUED | OUTPATIENT
Start: 2021-09-01 | End: 2021-09-02

## 2021-09-01 RX ADMIN — Medication 1.5 MILLIGRAM(S): at 01:50

## 2021-09-01 RX ADMIN — Medication 100 MILLIGRAM(S): at 20:17

## 2021-09-01 RX ADMIN — OXYCODONE HYDROCHLORIDE 5 MILLIGRAM(S): 5 TABLET ORAL at 20:16

## 2021-09-01 RX ADMIN — Medication 1 MILLIGRAM(S): at 17:51

## 2021-09-01 RX ADMIN — Medication 1 MILLIGRAM(S): at 21:26

## 2021-09-01 RX ADMIN — Medication 100 MILLIGRAM(S): at 15:02

## 2021-09-01 RX ADMIN — Medication 650 MILLIGRAM(S): at 14:42

## 2021-09-01 RX ADMIN — Medication 1 PACKET(S): at 21:26

## 2021-09-01 RX ADMIN — Medication 1.5 MILLIGRAM(S): at 05:57

## 2021-09-01 RX ADMIN — POTASSIUM PHOSPHATE, MONOBASIC POTASSIUM PHOSPHATE, DIBASIC 62.5 MILLIMOLE(S): 236; 224 INJECTION, SOLUTION INTRAVENOUS at 17:52

## 2021-09-01 RX ADMIN — HEPARIN SODIUM 5000 UNIT(S): 5000 INJECTION INTRAVENOUS; SUBCUTANEOUS at 18:05

## 2021-09-01 RX ADMIN — HEPARIN SODIUM 5000 UNIT(S): 5000 INJECTION INTRAVENOUS; SUBCUTANEOUS at 05:57

## 2021-09-01 RX ADMIN — Medication 1 TABLET(S): at 09:48

## 2021-09-01 RX ADMIN — Medication 650 MILLIGRAM(S): at 09:49

## 2021-09-01 RX ADMIN — Medication 100 MILLIGRAM(S): at 05:58

## 2021-09-01 RX ADMIN — OXYCODONE HYDROCHLORIDE 5 MILLIGRAM(S): 5 TABLET ORAL at 21:20

## 2021-09-01 RX ADMIN — Medication 1 MILLIGRAM(S): at 09:49

## 2021-09-01 RX ADMIN — Medication 1 MILLIGRAM(S): at 09:46

## 2021-09-01 RX ADMIN — PANTOPRAZOLE SODIUM 40 MILLIGRAM(S): 20 TABLET, DELAYED RELEASE ORAL at 09:49

## 2021-09-01 RX ADMIN — ATORVASTATIN CALCIUM 20 MILLIGRAM(S): 80 TABLET, FILM COATED ORAL at 21:27

## 2021-09-02 LAB
ALBUMIN SERPL ELPH-MCNC: 3.3 G/DL — SIGNIFICANT CHANGE UP (ref 3.3–5)
ALP SERPL-CCNC: 53 U/L — SIGNIFICANT CHANGE UP (ref 40–120)
ALT FLD-CCNC: 29 U/L — SIGNIFICANT CHANGE UP (ref 12–78)
ANION GAP SERPL CALC-SCNC: 5 MMOL/L — SIGNIFICANT CHANGE UP (ref 5–17)
AST SERPL-CCNC: 45 U/L — HIGH (ref 15–37)
BILIRUB SERPL-MCNC: 0.8 MG/DL — SIGNIFICANT CHANGE UP (ref 0.2–1.2)
BUN SERPL-MCNC: 12 MG/DL — SIGNIFICANT CHANGE UP (ref 7–23)
CALCIUM SERPL-MCNC: 8.8 MG/DL — SIGNIFICANT CHANGE UP (ref 8.5–10.1)
CHLORIDE SERPL-SCNC: 108 MMOL/L — SIGNIFICANT CHANGE UP (ref 96–108)
CO2 SERPL-SCNC: 25 MMOL/L — SIGNIFICANT CHANGE UP (ref 22–31)
CREAT SERPL-MCNC: 0.82 MG/DL — SIGNIFICANT CHANGE UP (ref 0.5–1.3)
GLUCOSE SERPL-MCNC: 94 MG/DL — SIGNIFICANT CHANGE UP (ref 70–99)
HCT VFR BLD CALC: 35 % — LOW (ref 39–50)
HGB BLD-MCNC: 10.6 G/DL — LOW (ref 13–17)
MAGNESIUM SERPL-MCNC: 2.2 MG/DL — SIGNIFICANT CHANGE UP (ref 1.6–2.6)
MCHC RBC-ENTMCNC: 22.7 PG — LOW (ref 27–34)
MCHC RBC-ENTMCNC: 30.3 GM/DL — LOW (ref 32–36)
MCV RBC AUTO: 74.9 FL — LOW (ref 80–100)
NRBC # BLD: 0 /100 WBCS — SIGNIFICANT CHANGE UP (ref 0–0)
PHOSPHATE SERPL-MCNC: 3.1 MG/DL — SIGNIFICANT CHANGE UP (ref 2.5–4.5)
PLATELET # BLD AUTO: 209 K/UL — SIGNIFICANT CHANGE UP (ref 150–400)
POTASSIUM SERPL-MCNC: 4.1 MMOL/L — SIGNIFICANT CHANGE UP (ref 3.5–5.3)
POTASSIUM SERPL-SCNC: 4.1 MMOL/L — SIGNIFICANT CHANGE UP (ref 3.5–5.3)
PROT SERPL-MCNC: 7.9 GM/DL — SIGNIFICANT CHANGE UP (ref 6–8.3)
RBC # BLD: 4.67 M/UL — SIGNIFICANT CHANGE UP (ref 4.2–5.8)
RBC # FLD: 19.9 % — HIGH (ref 10.3–14.5)
SODIUM SERPL-SCNC: 138 MMOL/L — SIGNIFICANT CHANGE UP (ref 135–145)
WBC # BLD: 3.44 K/UL — LOW (ref 3.8–10.5)
WBC # FLD AUTO: 3.44 K/UL — LOW (ref 3.8–10.5)

## 2021-09-02 PROCEDURE — 71260 CT THORAX DX C+: CPT | Mod: 26

## 2021-09-02 PROCEDURE — 74177 CT ABD & PELVIS W/CONTRAST: CPT | Mod: 26

## 2021-09-02 PROCEDURE — 99232 SBSQ HOSP IP/OBS MODERATE 35: CPT

## 2021-09-02 RX ORDER — PANTOPRAZOLE SODIUM 20 MG/1
40 TABLET, DELAYED RELEASE ORAL
Refills: 0 | Status: DISCONTINUED | OUTPATIENT
Start: 2021-09-02 | End: 2021-09-03

## 2021-09-02 RX ORDER — SODIUM CHLORIDE 9 MG/ML
1000 INJECTION, SOLUTION INTRAVENOUS
Refills: 0 | Status: DISCONTINUED | OUTPATIENT
Start: 2021-09-02 | End: 2021-09-03

## 2021-09-02 RX ORDER — IOHEXOL 300 MG/ML
30 INJECTION, SOLUTION INTRAVENOUS ONCE
Refills: 0 | Status: COMPLETED | OUTPATIENT
Start: 2021-09-02 | End: 2021-09-02

## 2021-09-02 RX ADMIN — Medication 0.5 MILLIGRAM(S): at 20:42

## 2021-09-02 RX ADMIN — PANTOPRAZOLE SODIUM 40 MILLIGRAM(S): 20 TABLET, DELAYED RELEASE ORAL at 05:34

## 2021-09-02 RX ADMIN — Medication 100 MILLIGRAM(S): at 05:34

## 2021-09-02 RX ADMIN — Medication 0.5 MILLIGRAM(S): at 16:35

## 2021-09-02 RX ADMIN — HEPARIN SODIUM 5000 UNIT(S): 5000 INJECTION INTRAVENOUS; SUBCUTANEOUS at 17:51

## 2021-09-02 RX ADMIN — Medication 100 MILLIGRAM(S): at 16:39

## 2021-09-02 RX ADMIN — Medication 1 MILLIGRAM(S): at 01:49

## 2021-09-02 RX ADMIN — ATORVASTATIN CALCIUM 20 MILLIGRAM(S): 80 TABLET, FILM COATED ORAL at 22:45

## 2021-09-02 RX ADMIN — SODIUM CHLORIDE 75 MILLILITER(S): 9 INJECTION, SOLUTION INTRAVENOUS at 17:49

## 2021-09-02 RX ADMIN — Medication 1 MILLIGRAM(S): at 05:32

## 2021-09-02 RX ADMIN — IOHEXOL 30 MILLILITER(S): 300 INJECTION, SOLUTION INTRAVENOUS at 12:26

## 2021-09-02 RX ADMIN — Medication 100 MILLIGRAM(S): at 20:48

## 2021-09-02 RX ADMIN — PANTOPRAZOLE SODIUM 40 MILLIGRAM(S): 20 TABLET, DELAYED RELEASE ORAL at 17:53

## 2021-09-02 RX ADMIN — Medication 1 TABLET(S): at 11:02

## 2021-09-02 RX ADMIN — HEPARIN SODIUM 5000 UNIT(S): 5000 INJECTION INTRAVENOUS; SUBCUTANEOUS at 05:34

## 2021-09-02 RX ADMIN — Medication 1 MILLIGRAM(S): at 11:02

## 2021-09-02 RX ADMIN — Medication 0.5 MILLIGRAM(S): at 11:02

## 2021-09-02 NOTE — PROGRESS NOTE ADULT - PROBLEM SELECTOR PLAN 5
Hypophosphatemia: replaced.     DVTp: heparin
Hypophosphatemia: replaced.     DVTp: heparin
Hypophosphatemia: replaced.     DVTp: heparin SQ-dvt ppx     general precautions   no e/o ETOH w/d at this time

## 2021-09-02 NOTE — CONSULT NOTE ADULT - ASSESSMENT
HPI:  56 years old male with h/o hronic ETOH abuse and dependence (1 bottle of Vodka a day) with long standing hx of alcohol withdrawal and DTs with frequent hospital/ICU admissions, alcoholic gastritis/esophagitis, PUD, HLD, hx of hypoxic respiratory failure requiring intubation due to aspiration PNA (most recent intubation Aril 2020),  staph PNA, COVID-19 infection Dec 2020 present to ED with complain of N/V and epigastric pain for last few days. Patient reported multiple episode of non bloody vomiting for last 2 days, unable to tolerate any oral intake. He reported intermittent, 10/10 burning epigastric pain, wrosen with vomiting and no radiation. He feels like he is shaky and withdrawing from alcohol. Reported last drink was more than 1 week ago to me but in ED, he reported last drink was yesterday.   Tachycardic to 130s, hypertensive in ED. Appear shaky. Labs with AGAP metabolic acidosis, bicarb 11, AGAP 29, Cr 1.88 ( baseline around 1), lipase normal, LFT normal, ETOH level 103. Patient was given NS 1L , diazepam 10mg IV, ativan 2mg IV, thiamine injection, IV magnesium 2gram, zofran in ED  Admitted for ETOH withdrawal, alcoholic gastritis, ROSA (30 Aug 2021 09:44)  -------- As Above ----------- Patient is a poor historian  Consult called for N/V,  abdominal pain. Patient known to my service. History of ETOH abuse. Occasionally presents with N/V and abdominal pain when drinking. Work ups in the past have been negative.   Patient feeling much better today. Tolerating liquids Abdominal pain almost completely gone. ( Now a vague discomfort ) Denies NSAIDs.   Has a microcytic anemia. The patient denies melena, hematochezia, hematemesis, nausea, vomiting, abdominal pain, constipation, diarrhea, or change in bowel movements prior to his last binge drinking.   Last colonoscopy ?    N/V, abdominal pain - probably from alcoholic gastritis , r/o obstruction - 1) KUB  2) Advance diet as tolerated as long as KUB is OK  Microcytic anemia - 1) Fe/TIBC/Ferritin 2) Hemoglobin electrophoresis 3) Patient refusing GI w/u at the present time    HPI:  56 years old male with h/o hronic ETOH abuse and dependence (1 bottle of Vodka a day) with long standing hx of alcohol withdrawal and DTs with frequent hospital/ICU admissions, alcoholic gastritis/esophagitis, PUD, HLD, hx of hypoxic respiratory failure requiring intubation due to aspiration PNA (most recent intubation Aril 2020),  staph PNA, COVID-19 infection Dec 2020 present to ED with complain of N/V and epigastric pain for last few days. Patient reported multiple episode of non bloody vomiting for last 2 days, unable to tolerate any oral intake. He reported intermittent, 10/10 burning epigastric pain, wrosen with vomiting and no radiation. He feels like he is shaky and withdrawing from alcohol. Reported last drink was more than 1 week ago to me but in ED, he reported last drink was yesterday.   Tachycardic to 130s, hypertensive in ED. Appear shaky. Labs with AGAP metabolic acidosis, bicarb 11, AGAP 29, Cr 1.88 ( baseline around 1), lipase normal, LFT normal, ETOH level 103. Patient was given NS 1L , diazepam 10mg IV, ativan 2mg IV, thiamine injection, IV magnesium 2gram, zofran in ED  Admitted for ETOH withdrawal, alcoholic gastritis, ROSA (30 Aug 2021 09:44)  -------- As Above ----------- Patient is a poor historian  Consult called for N/V,  abdominal pain. Patient known to my service. History of ETOH abuse. Occasionally presents with N/V and abdominal pain when drinking. Work ups in the past have been negative.   Patient feeling much better today. Tolerating liquids Abdominal pain almost completely gone. ( Now a vague discomfort ) Denies NSAIDs.   Patient now also complaining of difficulty swallowing pills. It needs to be chopped  Has a microcytic anemia. The patient denies melena, hematochezia, hematemesis, nausea, vomiting, abdominal pain, constipation, diarrhea, or change in bowel movements prior to his last binge drinking.   Last colonoscopy ?    N/V, abdominal pain - probably from alcoholic gastritis , r/o obstruction - 1) KUB  2) Advance diet as tolerated as long as KUB is OK  Microcytic anemia - 1) Fe/TIBC/Ferritin 2) Hemoglobin electrophoresis 3) Patient refusing GI w/u at the present time   Dysphagia - Chest CT / speech evaluation

## 2021-09-02 NOTE — PROGRESS NOTE ADULT - PROBLEM SELECTOR PLAN 1
h/o chronic ETOH abuse, frequent admission with withdrawal  CIWA protocol, if higher than 15, consider MICU consult  Thiamine 100mg tid, folic acid 1mg and multivitamin  telemetry monitoring
h/o chronic ETOH abuse, frequent admission with withdrawal  CIWA protocol, if higher than 15, consider MICU consult  Thiamine 100mg tid, folic acid 1mg and multivitamin  CIWA 2-3
h/o chronic ETOH abuse, frequent admission with withdrawal  CIWA protocol, if higher than 15, consider MICU consult  Thiamine 100mg tid, folic acid 1mg and multivitamin  telemetry monitoring

## 2021-09-02 NOTE — PROGRESS NOTE ADULT - PROBLEM SELECTOR PLAN 3
Cr 1.88 ( baseline around 1)  Likely prerenal due to N/V and unable to tolerate oral intake  improving renal function, cont w/ IV hydration  Monitor renal function
Cr 1.88 ( baseline around 1)  Likely prerenal due to N/V and unable to tolerate oral intake  improving renal function, cont w/ IV hydration  Monitor renal function
Cr 1.88 ( baseline around 1)  Likely prerenal   improved with IVF   Monitor renal function

## 2021-09-02 NOTE — PROGRESS NOTE ADULT - PROBLEM SELECTOR PLAN 4
resolved with IV hydration   continue to monitor labs.

## 2021-09-02 NOTE — PROGRESS NOTE ADULT - PROBLEM SELECTOR PLAN 2
Present with nausea, vomiting and epigastric pain  Clear liquid and advance as tolerated  cont w/ IV hydration and Zofran prn
Present with nausea, vomiting and epigastric pain  Clear liquid and advance as tolerated  cont w/ IV hydration and Zofran prn
most likely ETOH related gastritis and esophagitis   Present with nausea, vomiting and epigastric pain  cont w/ IV hydration and Zofran prn  follow CT C/A/P with contrast   GI consult appreciated   swallow eval   continue with full liquid diet

## 2021-09-02 NOTE — CONSULT NOTE ADULT - SUBJECTIVE AND OBJECTIVE BOX
HPI:  56 years old male with h/o hronic ETOH abuse and dependence (1 bottle of Vodka a day) with long standing hx of alcohol withdrawal and DTs with frequent hospital/ICU admissions, alcoholic gastritis/esophagitis, PUD, HLD, hx of hypoxic respiratory failure requiring intubation due to aspiration PNA (most recent intubation Aril 2020),  staph PNA, COVID-19 infection Dec 2020 present to ED with complain of N/V and epigastric pain for last few days. Patient reported multiple episode of non bloody vomiting for last 2 days, unable to tolerate any oral intake. He reported intermittent, 10/10 burning epigastric pain, wrosen with vomiting and no radiation. He feels like he is shaky and withdrawing from alcohol. Reported last drink was more than 1 week ago to me but in ED, he reported last drink was yesterday.   Tachycardic to 130s, hypertensive in ED. Appear shaky. Labs with AGAP metabolic acidosis, bicarb 11, AGAP 29, Cr 1.88 ( baseline around 1), lipase normal, LFT normal, ETOH level 103. Patient was given NS 1L , diazepam 10mg IV, ativan 2mg IV, thiamine injection, IV magnesium 2gram, zofran in ED  Admitted for ETOH withdrawal, alcoholic gastritis, ROSA (30 Aug 2021 09:44)  -------- As Above ----------- Patient is a poor historian  Consult called for N/V,  abdominal pain. Patient known to my service. History of ETOH abuse. Occasionally presents with N/V and abdominal pain when drinking. Work ups in the past have been negative.   Patient feeling much better today. Tolerating liquids Abdominal pain almost completely gone. ( Now a vague discomfort ) Denies NSAIDs.   Has a microcytic anemia. The patient denies melena, hematochezia, hematemesis, nausea, vomiting, abdominal pain, constipation, diarrhea, or change in bowel movements prior to his last binge drinking.   Last colonoscopy ?      PAST MEDICAL & SURGICAL HISTORY:  PUD (peptic ulcer disease)    Mixed hyperlipidemia    EtOH dependence    Gastritis    Substance abuse    DTs (delirium tremens)    No significant past surgical history        MEDICATIONS  (STANDING):  atorvastatin 20 milliGRAM(s) Oral at bedtime  folic acid 1 milliGRAM(s) Oral daily  heparin   Injectable 5000 Unit(s) SubCutaneous every 12 hours  lactated ringers. 1000 milliLiter(s) (75 mL/Hr) IV Continuous <Continuous>  LORazepam   Injectable 0.5 milliGRAM(s) IV Push every 4 hours  LORazepam   Injectable   IV Push   multivitamin 1 Tablet(s) Oral daily  pantoprazole    Tablet 40 milliGRAM(s) Oral two times a day  thiamine 100 milliGRAM(s) Oral <User Schedule>    MEDICATIONS  (PRN):  acetaminophen   Tablet .. 650 milliGRAM(s) Oral every 6 hours PRN Temp greater or equal to 38.5C (101.3F), Mild Pain (1 - 3), Moderate Pain (4 - 6)  melatonin 3 milliGRAM(s) Oral at bedtime PRN Insomnia  morphine  - Injectable 2 milliGRAM(s) IV Push every 6 hours PRN Severe Pain (7 - 10)  ondansetron Injectable 4 milliGRAM(s) IV Push every 8 hours PRN Nausea and/or Vomiting      Allergies    No Known Allergies    Intolerances        FAMILY HISTORY:  No pertinent family history in first degree relatives        REVIEW OF SYSTEMS:    CONSTITUTIONAL: No fever, weight loss, or fatigue  EYES: No eye pain, visual disturbances, or discharge  ENMT:  No difficulty hearing, tinnitus, vertigo; No sinus or throat pain  NECK: No pain or stiffness  BREASTS: No pain, masses, or nipple discharge  RESPIRATORY: No cough, wheezing, chills or hemoptysis; No shortness of breath  CARDIOVASCULAR: No chest pain, palpitations, dizziness, or leg swelling  GASTROINTESTINAL: See above   GENITOURINARY: No dysuria, frequency, hematuria, or incontinence  NEUROLOGICAL: No headaches, , numbness, or tremors  SKIN: No itching, burning, rashes, or lesions   LYMPH NODES: No enlarged glands  ENDOCRINE: No heat or cold intolerance; No hair loss  MUSCULOSKELETAL: No joint pain or swelling; No muscle, back, or extremity pain  PSYCHIATRIC: No depression, anxiety, mood swings, or difficulty sleeping  HEME/LYMPH: No easy bruising, or bleeding gums  ALLERGY AND IMMUNOLOGIC: No hives or eczema          SOCIAL HISTORY:    FAMILY HISTORY:  No pertinent family history in first degree relatives        Vital Signs Last 24 Hrs  T(C): 36.7 (02 Sep 2021 11:19), Max: 36.8 (01 Sep 2021 17:34)  T(F): 98.1 (02 Sep 2021 11:19), Max: 98.2 (01 Sep 2021 17:34)  HR: 98 (02 Sep 2021 11:19) (54 - 98)  BP: 128/90 (02 Sep 2021 11:19) (119/71 - 134/89)  BP(mean): --  RR: 19 (02 Sep 2021 11:19) (17 - 19)  SpO2: 98% (02 Sep 2021 11:19) (96% - 99%)    PHYSICAL EXAM:    GENERAL: NAD, well-groomed, well-developed  HEAD:  Atraumatic, Normocephalic  EYES: EOMI, PERRLA, conjunctiva and sclera clear  NECK: Supple, No JVD, Normal thyroid  NERVOUS SYSTEM:  Alert & Oriented X3, Good concentration; Motor Strength 5/5 B/L upper and lower extremities;   CHEST/LUNG: Clear to percussion bilaterally; No rales, rhonchi, wheezing, or rubs  HEART: Regular rate and rhythm; No murmurs, rubs, or gallops  ABDOMEN: Soft, Nontender, Nondistended; Bowel sounds present  EXTREMITIES:  2+ Peripheral Pulses, No clubbing, cyanosis, or edema  LYMPH: No lymphadenopathy noted   RECTAL:  Refused  SKIN: No rashes or lesions    LABS:                        10.6   3.44  )-----------( 209      ( 02 Sep 2021 06:21 )             35.0       CBC:  09-02 @ 06:21  WBC  3.44  HGB 10.6  HCT 35.0 Plate 209  MCV 74.9  09-01 @ 10:33  WBC  2.61  HGB 10.6  HCT 34.2 Plate 210  MCV 75.8  08-31 @ 06:58  WBC  5.33  HGB 10.2  HCT 32.9 Plate 252  MCV 73.4  08-30 @ 07:20  WBC  9.25  HGB 13.0  HCT 41.7 Plate 438  MCV 73.0           02 Sep 2021 06:21    138    |  108    |  12     ----------------------------<  94     4.1     |  25     |  0.82   01 Sep 2021 10:33    141    |  107    |  13     ----------------------------<  145    3.6     |  28     |  1.08   31 Aug 2021 06:58    141    |  107    |  17     ----------------------------<  105    3.5     |  31     |  1.11   30 Aug 2021 11:13    142    |  105    |  23     ----------------------------<  138    3.9     |  25     |  1.44   30 Aug 2021 07:20    138    |  98     |  22     ----------------------------<  171    3.8     |  11     |  1.88     Ca    8.8        02 Sep 2021 06:21  Ca    9.0        01 Sep 2021 10:33  Ca    8.0        31 Aug 2021 06:58  Ca    8.4        30 Aug 2021 11:13  Ca    8.9        30 Aug 2021 07:20  Phos  3.1       02 Sep 2021 06:21  Phos  2.4       01 Sep 2021 10:33  Phos  1.8       31 Aug 2021 06:58  Phos  2.1       30 Aug 2021 11:13  Mg     2.2       02 Sep 2021 06:21  Mg     2.4       01 Sep 2021 10:33  Mg     2.7       31 Aug 2021 06:58  Mg     2.9       30 Aug 2021 11:13    TPro  7.9    /  Alb  3.3    /  TBili  0.8    /  DBili  x      /  AST  45     /  ALT  29     /  AlkPhos  53     02 Sep 2021 06:21  TPro  8.1    /  Alb  3.3    /  TBili  0.6    /  DBili  x      /  AST  37     /  ALT  23     /  AlkPhos  53     01 Sep 2021 10:33  TPro  7.4    /  Alb  3.4    /  TBili  0.9    /  DBili  .17    /  AST  33     /  ALT  22     /  AlkPhos  52     31 Aug 2021 06:58  TPro  9.2    /  Alb  4.2    /  TBili  0.5    /  DBili  x      /  AST  32     /  ALT  27     /  AlkPhos  66     30 Aug 2021 07:20            RADIOLOGY & ADDITIONAL STUDIES: HPI:  56 years old male with h/o hronic ETOH abuse and dependence (1 bottle of Vodka a day) with long standing hx of alcohol withdrawal and DTs with frequent hospital/ICU admissions, alcoholic gastritis/esophagitis, PUD, HLD, hx of hypoxic respiratory failure requiring intubation due to aspiration PNA (most recent intubation Aril 2020),  staph PNA, COVID-19 infection Dec 2020 present to ED with complain of N/V and epigastric pain for last few days. Patient reported multiple episode of non bloody vomiting for last 2 days, unable to tolerate any oral intake. He reported intermittent, 10/10 burning epigastric pain, wrosen with vomiting and no radiation. He feels like he is shaky and withdrawing from alcohol. Reported last drink was more than 1 week ago to me but in ED, he reported last drink was yesterday.   Tachycardic to 130s, hypertensive in ED. Appear shaky. Labs with AGAP metabolic acidosis, bicarb 11, AGAP 29, Cr 1.88 ( baseline around 1), lipase normal, LFT normal, ETOH level 103. Patient was given NS 1L , diazepam 10mg IV, ativan 2mg IV, thiamine injection, IV magnesium 2gram, zofran in ED  Admitted for ETOH withdrawal, alcoholic gastritis, ROSA (30 Aug 2021 09:44)  -------- As Above ----------- Patient is a poor historian  Consult called for N/V,  abdominal pain. Patient known to my service. History of ETOH abuse. Occasionally presents with N/V and abdominal pain when drinking. Work ups in the past have been negative.   Patient feeling much better today. Tolerating liquids Abdominal pain almost completely gone. ( Now a vague discomfort ) Denies NSAIDs.   Patient now also complaining of difficulty swallowing pills. It needs to be chopped  Has a microcytic anemia. The patient denies melena, hematochezia, hematemesis, nausea, vomiting, abdominal pain, constipation, diarrhea, or change in bowel movements prior to his last binge drinking.   Last colonoscopy ?      PAST MEDICAL & SURGICAL HISTORY:  PUD (peptic ulcer disease)    Mixed hyperlipidemia    EtOH dependence    Gastritis    Substance abuse    DTs (delirium tremens)    No significant past surgical history        MEDICATIONS  (STANDING):  atorvastatin 20 milliGRAM(s) Oral at bedtime  folic acid 1 milliGRAM(s) Oral daily  heparin   Injectable 5000 Unit(s) SubCutaneous every 12 hours  lactated ringers. 1000 milliLiter(s) (75 mL/Hr) IV Continuous <Continuous>  LORazepam   Injectable 0.5 milliGRAM(s) IV Push every 4 hours  LORazepam   Injectable   IV Push   multivitamin 1 Tablet(s) Oral daily  pantoprazole    Tablet 40 milliGRAM(s) Oral two times a day  thiamine 100 milliGRAM(s) Oral <User Schedule>    MEDICATIONS  (PRN):  acetaminophen   Tablet .. 650 milliGRAM(s) Oral every 6 hours PRN Temp greater or equal to 38.5C (101.3F), Mild Pain (1 - 3), Moderate Pain (4 - 6)  melatonin 3 milliGRAM(s) Oral at bedtime PRN Insomnia  morphine  - Injectable 2 milliGRAM(s) IV Push every 6 hours PRN Severe Pain (7 - 10)  ondansetron Injectable 4 milliGRAM(s) IV Push every 8 hours PRN Nausea and/or Vomiting      Allergies    No Known Allergies    Intolerances        FAMILY HISTORY:  No pertinent family history in first degree relatives        REVIEW OF SYSTEMS:    CONSTITUTIONAL: No fever, weight loss, or fatigue  EYES: No eye pain, visual disturbances, or discharge  ENMT:  No difficulty hearing, tinnitus, vertigo; No sinus or throat pain  NECK: No pain or stiffness  BREASTS: No pain, masses, or nipple discharge  RESPIRATORY: No cough, wheezing, chills or hemoptysis; No shortness of breath  CARDIOVASCULAR: No chest pain, palpitations, dizziness, or leg swelling  GASTROINTESTINAL: See above   GENITOURINARY: No dysuria, frequency, hematuria, or incontinence  NEUROLOGICAL: No headaches, , numbness, or tremors  SKIN: No itching, burning, rashes, or lesions   LYMPH NODES: No enlarged glands  ENDOCRINE: No heat or cold intolerance; No hair loss  MUSCULOSKELETAL: No joint pain or swelling; No muscle, back, or extremity pain  PSYCHIATRIC: No depression, anxiety, mood swings, or difficulty sleeping  HEME/LYMPH: No easy bruising, or bleeding gums  ALLERGY AND IMMUNOLOGIC: No hives or eczema          SOCIAL HISTORY:    FAMILY HISTORY:  No pertinent family history in first degree relatives        Vital Signs Last 24 Hrs  T(C): 36.7 (02 Sep 2021 11:19), Max: 36.8 (01 Sep 2021 17:34)  T(F): 98.1 (02 Sep 2021 11:19), Max: 98.2 (01 Sep 2021 17:34)  HR: 98 (02 Sep 2021 11:19) (54 - 98)  BP: 128/90 (02 Sep 2021 11:19) (119/71 - 134/89)  BP(mean): --  RR: 19 (02 Sep 2021 11:19) (17 - 19)  SpO2: 98% (02 Sep 2021 11:19) (96% - 99%)    PHYSICAL EXAM:    GENERAL: NAD, well-groomed, well-developed  HEAD:  Atraumatic, Normocephalic  EYES: EOMI, PERRLA, conjunctiva and sclera clear  NECK: Supple, No JVD, Normal thyroid  NERVOUS SYSTEM:  Alert & Oriented X3, Good concentration; Motor Strength 5/5 B/L upper and lower extremities;   CHEST/LUNG: Clear to percussion bilaterally; No rales, rhonchi, wheezing, or rubs  HEART: Regular rate and rhythm; No murmurs, rubs, or gallops  ABDOMEN: Soft, Nontender, Nondistended; Bowel sounds present  EXTREMITIES:  2+ Peripheral Pulses, No clubbing, cyanosis, or edema  LYMPH: No lymphadenopathy noted   RECTAL:  Refused  SKIN: No rashes or lesions    LABS:                        10.6   3.44  )-----------( 209      ( 02 Sep 2021 06:21 )             35.0       CBC:  09-02 @ 06:21  WBC  3.44  HGB 10.6  HCT 35.0 Plate 209  MCV 74.9  09-01 @ 10:33  WBC  2.61  HGB 10.6  HCT 34.2 Plate 210  MCV 75.8  08-31 @ 06:58  WBC  5.33  HGB 10.2  HCT 32.9 Plate 252  MCV 73.4  08-30 @ 07:20  WBC  9.25  HGB 13.0  HCT 41.7 Plate 438  MCV 73.0           02 Sep 2021 06:21    138    |  108    |  12     ----------------------------<  94     4.1     |  25     |  0.82   01 Sep 2021 10:33    141    |  107    |  13     ----------------------------<  145    3.6     |  28     |  1.08   31 Aug 2021 06:58    141    |  107    |  17     ----------------------------<  105    3.5     |  31     |  1.11   30 Aug 2021 11:13    142    |  105    |  23     ----------------------------<  138    3.9     |  25     |  1.44   30 Aug 2021 07:20    138    |  98     |  22     ----------------------------<  171    3.8     |  11     |  1.88     Ca    8.8        02 Sep 2021 06:21  Ca    9.0        01 Sep 2021 10:33  Ca    8.0        31 Aug 2021 06:58  Ca    8.4        30 Aug 2021 11:13  Ca    8.9        30 Aug 2021 07:20  Phos  3.1       02 Sep 2021 06:21  Phos  2.4       01 Sep 2021 10:33  Phos  1.8       31 Aug 2021 06:58  Phos  2.1       30 Aug 2021 11:13  Mg     2.2       02 Sep 2021 06:21  Mg     2.4       01 Sep 2021 10:33  Mg     2.7       31 Aug 2021 06:58  Mg     2.9       30 Aug 2021 11:13    TPro  7.9    /  Alb  3.3    /  TBili  0.8    /  DBili  x      /  AST  45     /  ALT  29     /  AlkPhos  53     02 Sep 2021 06:21  TPro  8.1    /  Alb  3.3    /  TBili  0.6    /  DBili  x      /  AST  37     /  ALT  23     /  AlkPhos  53     01 Sep 2021 10:33  TPro  7.4    /  Alb  3.4    /  TBili  0.9    /  DBili  .17    /  AST  33     /  ALT  22     /  AlkPhos  52     31 Aug 2021 06:58  TPro  9.2    /  Alb  4.2    /  TBili  0.5    /  DBili  x      /  AST  32     /  ALT  27     /  AlkPhos  66     30 Aug 2021 07:20            RADIOLOGY & ADDITIONAL STUDIES:

## 2021-09-02 NOTE — PROGRESS NOTE ADULT - PROBLEM SELECTOR PROBLEM 1
Moderate alcohol withdrawal without perceptual disturbances

## 2021-09-03 ENCOUNTER — TRANSCRIPTION ENCOUNTER (OUTPATIENT)
Age: 56
End: 2021-09-03

## 2021-09-03 VITALS
TEMPERATURE: 98 F | SYSTOLIC BLOOD PRESSURE: 114 MMHG | OXYGEN SATURATION: 99 % | RESPIRATION RATE: 18 BRPM | DIASTOLIC BLOOD PRESSURE: 74 MMHG | HEART RATE: 80 BPM

## 2021-09-03 PROCEDURE — 99239 HOSP IP/OBS DSCHRG MGMT >30: CPT

## 2021-09-03 RX ORDER — ATORVASTATIN CALCIUM 80 MG/1
1 TABLET, FILM COATED ORAL
Qty: 0 | Refills: 0 | DISCHARGE

## 2021-09-03 RX ORDER — MAGNESIUM OXIDE 400 MG ORAL TABLET 241.3 MG
1 TABLET ORAL
Qty: 0 | Refills: 0 | DISCHARGE

## 2021-09-03 RX ORDER — ACETAMINOPHEN 500 MG
2 TABLET ORAL
Qty: 0 | Refills: 0 | DISCHARGE
Start: 2021-09-03

## 2021-09-03 RX ORDER — THIAMINE MONONITRATE (VIT B1) 100 MG
1 TABLET ORAL
Qty: 30 | Refills: 0
Start: 2021-09-03 | End: 2021-10-02

## 2021-09-03 RX ORDER — THIAMINE MONONITRATE (VIT B1) 100 MG
1 TABLET ORAL
Qty: 0 | Refills: 0 | DISCHARGE

## 2021-09-03 RX ORDER — PANTOPRAZOLE SODIUM 20 MG/1
1 TABLET, DELAYED RELEASE ORAL
Qty: 60 | Refills: 0
Start: 2021-09-03 | End: 2021-10-02

## 2021-09-03 RX ORDER — FOLIC ACID 0.8 MG
1 TABLET ORAL
Qty: 0 | Refills: 0 | DISCHARGE

## 2021-09-03 RX ORDER — FOLIC ACID 0.8 MG
1 TABLET ORAL
Qty: 30 | Refills: 0
Start: 2021-09-03 | End: 2021-10-02

## 2021-09-03 RX ORDER — PANTOPRAZOLE SODIUM 20 MG/1
1 TABLET, DELAYED RELEASE ORAL
Qty: 0 | Refills: 0 | DISCHARGE

## 2021-09-03 RX ORDER — ATORVASTATIN CALCIUM 80 MG/1
1 TABLET, FILM COATED ORAL
Qty: 30 | Refills: 0
Start: 2021-09-03 | End: 2021-10-02

## 2021-09-03 RX ADMIN — Medication 100 MILLIGRAM(S): at 17:02

## 2021-09-03 RX ADMIN — Medication 0.5 MILLIGRAM(S): at 06:12

## 2021-09-03 RX ADMIN — Medication 1 MILLIGRAM(S): at 11:43

## 2021-09-03 RX ADMIN — Medication 0.5 MILLIGRAM(S): at 11:39

## 2021-09-03 RX ADMIN — Medication 5 MILLIGRAM(S): at 17:03

## 2021-09-03 RX ADMIN — Medication 1 TABLET(S): at 11:43

## 2021-09-03 RX ADMIN — Medication 0.5 MILLIGRAM(S): at 00:14

## 2021-09-03 RX ADMIN — SODIUM CHLORIDE 75 MILLILITER(S): 9 INJECTION, SOLUTION INTRAVENOUS at 11:44

## 2021-09-03 RX ADMIN — HEPARIN SODIUM 5000 UNIT(S): 5000 INJECTION INTRAVENOUS; SUBCUTANEOUS at 06:02

## 2021-09-03 RX ADMIN — SODIUM CHLORIDE 75 MILLILITER(S): 9 INJECTION, SOLUTION INTRAVENOUS at 00:15

## 2021-09-03 RX ADMIN — PANTOPRAZOLE SODIUM 40 MILLIGRAM(S): 20 TABLET, DELAYED RELEASE ORAL at 17:04

## 2021-09-03 RX ADMIN — Medication 100 MILLIGRAM(S): at 06:12

## 2021-09-03 RX ADMIN — PANTOPRAZOLE SODIUM 40 MILLIGRAM(S): 20 TABLET, DELAYED RELEASE ORAL at 06:11

## 2021-09-03 NOTE — DISCHARGE NOTE PROVIDER - NSDCCPCAREPLAN_GEN_ALL_CORE_FT
PRINCIPAL DISCHARGE DIAGNOSIS  Diagnosis: Alcohol withdrawal  Assessment and Plan of Treatment:       SECONDARY DISCHARGE DIAGNOSES  Diagnosis: Alcoholic gastritis  Assessment and Plan of Treatment:     Diagnosis: High anion gap metabolic acidosis  Assessment and Plan of Treatment:     Diagnosis: ROSA (acute kidney injury)  Assessment and Plan of Treatment:     Diagnosis: Esophagitis  Assessment and Plan of Treatment:

## 2021-09-03 NOTE — PROGRESS NOTE ADULT - REASON FOR ADMISSION
alcohol withdrawal, alcoholic gastritic, ROSA

## 2021-09-03 NOTE — PROGRESS NOTE ADULT - TIME BILLING
GI
min spent reviewing chart, examining patient, discussing plan with patient and family
min spent reviewing chart, examining patient, discussing plan with patient and family
Lab test review, Radiology Review, Vitals review, Consultant review and discussion, Physical examination, IDR, Assessment and plan; Plan discussion with patient

## 2021-09-03 NOTE — DISCHARGE NOTE PROVIDER - CARE PROVIDER_API CALL
[place] [address]  Phone: [phone]  Fax: [phone]  Follow Up Time: 2 weeks    your primary care doctor,   [facility] - Primary Care Clinic  Medical office in [place], [place]  Address: [address] [place] [address]  Phone: (   )    -  Fax: (   )    -  Follow Up Time: 1 week

## 2021-09-03 NOTE — DISCHARGE NOTE PROVIDER - HOSPITAL COURSE
56 years old male with h/o hronic ETOH abuse and dependence (1 bottle of Vodka a day) with long standing hx of alcohol withdrawal and DTs with frequent hospital/ICU admissions, alcoholic gastritis/esophagitis, PUD, HLD, hx of hypoxic respiratory failure requiring intubation due to aspiration PNA (most recent intubation Aril 2020),  staph PNA, COVID-19 infection Dec 2020 present to ED with complain of N/V and epigastric pain for last few days  Admitted for ETOH withdrawal, alcoholic gastritis, ROSA, AGAP metabolic acidosis  EKG: sinus tachy     DISCHARGE DIAGNOSES:     # Moderate alcohol withdrawal without perceptual disturbances, Not in ETOH w/d   Thiamine 100mg tid, folic acid 1mg and multivitamin  CIWA 0     # Alcoholic gastritis/esophagitis    - most likely ETOH related gastritis and esophagitis   hepatic steatosis   Present with nausea, vomiting and epigastric pain  CT C/A/P with contrast : No bowel obstruction. Appendix normal. Mural thickening of the gastric wall along the greater curvature which may be secondary to gastritis. Mural thickening of the first duodenum also suggestive of inflammatory disease.  tolerated regular diet. denies pain or difficulty swallowing   GI consult appreciated   no need for swallow eval   no oral thrush on exam   copy of report provided to patient , strongly advised to see outpatient GI for further work-up /EGD   (last EGD/colonoscopy per patient was 9 years ago and was normal)       ROSA (acute kidney injury) POA, resolved with IVF      High anion gap metabolic acidosis -- resolved with IV hydration   elevated lactate POA --now normalized     Right lower lobe subpleural nodules similar to 10/20/2019. Unchanged right middle lobe subpleural nodule  discussed findings with patient, he is aware and agreeable to follow-up     Hypophosphatemia: replaced.   leukopenia --in setting of  ETOH dependence . stable. monitor outpatient     Discharge time : 40 min       RETURN PARAMETERS DISCUSSED WITH PATIENT, PATIENT EXPRESSED UNDERSTANDING AND IS AGREEABLE. DISCUSSED WITH PATIENT ON REFRAINING FROM DRIVING UNTIL FOLLOW-UP/ CLEARED BY PMD. PATIENT EXPRESSED UNDERSTANDING.

## 2021-09-03 NOTE — DISCHARGE NOTE PROVIDER - NSDCMRMEDTOKEN_GEN_ALL_CORE_FT
acetaminophen 325 mg oral tablet: 2 tab(s) orally every 6 hours, As needed,   aspirin 81 mg oral delayed release tablet: 1 tab(s) orally once a day  atorvastatin 20 mg oral tablet: 1 tab(s) orally once a day  folic acid 1 mg oral tablet: 1 tab(s) orally once a day  Multiple Vitamins oral tablet: 1 tab(s) orally once a day  Protonix 40 mg oral delayed release tablet: 1 tab(s) orally 2 times a day   thiamine 100 mg oral tablet: 1 tab(s) orally once a day

## 2021-09-03 NOTE — DISCHARGE NOTE NURSING/CASE MANAGEMENT/SOCIAL WORK - PATIENT PORTAL LINK FT
You can access the FollowMyHealth Patient Portal offered by [facility] by registering at the following website: [email or web address]. By joining [facility]’s FollowMyHealth portal, you will also be able to view your health information using other applications (apps) compatible with our system.

## 2021-09-03 NOTE — DISCHARGE NOTE PROVIDER - PROVIDER TOKENS
PROVIDER:[TOKEN:[[id number]],FOLLOWUP:[2 weeks]],FREE:[LAST:[your primary care doctor],PHONE:[(   )    -],FAX:[(   )    -],ADDRESS:[[facility] - Primary Care Clinic  Medical office in [place], [place]  Address: [address] [place] [address]],FOLLOWUP:[1 week]]

## 2021-09-03 NOTE — DISCHARGE NOTE NURSING/CASE MANAGEMENT/SOCIAL WORK - NSDCVIVACCINE_GEN_ALL_CORE_FT
COVID-19, mRNA, LNP-S, PF, 100 mcg/ 0.5 mL dose (Moderna); 10-Jovan-2021 15:38; Reji Hernandez (RN); [employer] [id number] (Exp. Date: 13-Jul-2021); IntraMuscular; Deltoid Right.; 0.5 milliLiter(s);   influenza, injectable, quadrivalent, preservative free; 31-Jan-2019 15:53; Ayaka Bynum (RN); [employer]; [id number] (Exp. Date: 30-Jun-2019); IntraMuscular; Deltoid Right.; 0.5 milliLiter(s); VIS (VIS Published: 07-Aug-2015, VIS Presented: 31-Jan-2019);   influenza, injectable, quadrivalent, preservative free; 10-Nov-2019 14:13; Laverne Lane (RN); [employer]; [id number] (Exp. Date: 30-Jun-2020); IntraMuscular; Deltoid Left.; 0.5 milliLiter(s); VIS (VIS Published: 15-Aug-2019, VIS Presented: 10-Nov-2019);   influenza, injectable, quadrivalent, preservative free; 13-Oct-2020 11:56; Kimberli Cronin (RN); [employer]; [id number] (Exp. Date: 30-Jun-2021); IntraMuscular; Deltoid Right.; 0.5 milliLiter(s); VIS (VIS Published: 15-Aug-2019, VIS Presented: 13-Oct-2020);   Td (adult) preservative free; 18-Jan-2020 20:04; Yulia Vance (RN); "[employer] [id number] (Exp. Date: 19-Jan-2021); IntraMuscular; Deltoid Right.; 0.5 milliLiter(s); VIS (VIS Published: 18-Jan-2020, VIS Presented: 18-Jan-2020);

## 2021-09-03 NOTE — PROGRESS NOTE ADULT - PROVIDER SPECIALTY LIST ADULT
Critical Care Pt moved back to bed using gray transfer pad. Pt returned to unit with same equipment etc.  Resp notified of need for transport.

## 2021-09-03 NOTE — PROGRESS NOTE ADULT - SUBJECTIVE AND OBJECTIVE BOX
Patient is a 56y old  Male who presents with a chief complaint of alcohol withdrawal, alcoholic gastritic, ROSA (02 Sep 2021 14:35)      HPI:  56 years old male with h/o hronic ETOH abuse and dependence (1 bottle of Vodka a day) with long standing hx of alcohol withdrawal and DTs with frequent hospital/ICU admissions, alcoholic gastritis/esophagitis, PUD, HLD, hx of hypoxic respiratory failure requiring intubation due to aspiration PNA (most recent intubation Aril 2020),  staph PNA, COVID-19 infection Dec 2020 present to ED with complain of N/V and epigastric pain for last few days. Patient reported multiple episode of non bloody vomiting for last 2 days, unable to tolerate any oral intake. He reported intermittent, 10/10 burning epigastric pain, wrosen with vomiting and no radiation. He feels like he is shaky and withdrawing from alcohol. Reported last drink was more than 1 week ago to me but in ED, he reported last drink was yesterday.   Tachycardic to 130s, hypertensive in ED. Appear shaky. Labs with AGAP metabolic acidosis, bicarb 11, AGAP 29, Cr 1.88 ( baseline around 1), lipase normal, LFT normal, ETOH level 103. Patient was given NS 1L , diazepam 10mg IV, ativan 2mg IV, thiamine injection, IV magnesium 2gram, zofran in ED  Admitted for ETOH withdrawal, alcoholic gastritis, ROSA (30 Aug 2021 09:44)      INTERVAL HPI/OVERNIGHT EVENTS: Patient seen earlier today  Tolerated full liquid diet. The patient denies melena, dysphagia,  hematochezia, hematemesis, nausea, vomiting, abdominal pain, constipation, diarrhea, or change in bowel movements     MEDICATIONS  (STANDING):  atorvastatin 20 milliGRAM(s) Oral at bedtime  folic acid 1 milliGRAM(s) Oral daily  heparin   Injectable 5000 Unit(s) SubCutaneous every 12 hours  lactated ringers. 1000 milliLiter(s) (75 mL/Hr) IV Continuous <Continuous>  LORazepam   Injectable 0.5 milliGRAM(s) IV Push every 12 hours  LORazepam   Injectable   IV Push   multivitamin 1 Tablet(s) Oral daily  pantoprazole    Tablet 40 milliGRAM(s) Oral two times a day  thiamine 100 milliGRAM(s) Oral <User Schedule>    MEDICATIONS  (PRN):  acetaminophen   Tablet .. 650 milliGRAM(s) Oral every 6 hours PRN Temp greater or equal to 38.5C (101.3F), Mild Pain (1 - 3), Moderate Pain (4 - 6)  melatonin 3 milliGRAM(s) Oral at bedtime PRN Insomnia  morphine  - Injectable 2 milliGRAM(s) IV Push every 6 hours PRN Severe Pain (7 - 10)  ondansetron Injectable 4 milliGRAM(s) IV Push every 8 hours PRN Nausea and/or Vomiting      FAMILY HISTORY:  No pertinent family history in first degree relatives        Allergies    No Known Allergies    Intolerances        PMH/PSH:  Alcohol abuse    PUD (peptic ulcer disease)    Mixed hyperlipidemia    EtOH dependence    Gastritis    Substance abuse    DTs (delirium tremens)    No significant past surgical history          REVIEW OF SYSTEMS:  CONSTITUTIONAL: No fever, weight loss, or fatigue  EYES: No eye pain, visual disturbances, or discharge  ENMT:  No difficulty hearing, tinnitus, vertigo; No sinus or throat pain  NECK: No pain or stiffness  BREASTS: No pain, masses, or nipple discharge  RESPIRATORY: No cough, wheezing, chills or hemoptysis; No shortness of breath  CARDIOVASCULAR: No chest pain, palpitations, dizziness, or leg swelling  GASTROINTESTINAL: See above   GENITOURINARY: No dysuria, frequency, hematuria, or incontinence  NEUROLOGICAL: No headaches, memory loss, loss of strength, numbness, or tremors  SKIN: No itching, burning, rashes, or lesions   LYMPH NODES: No enlarged glands  ENDOCRINE: No heat or cold intolerance; No hair loss  MUSCULOSKELETAL: No joint pain or swelling; No muscle, back, or extremity pain  PSYCHIATRIC: No depression, anxiety, mood swings, or difficulty sleeping  HEME/LYMPH: No easy bruising, or bleeding gums  ALLERGY AND IMMUNOLOGIC: No hives or eczema    Vital Signs Last 24 Hrs  T(C): 36.8 (03 Sep 2021 10:39), Max: 36.8 (03 Sep 2021 06:13)  T(F): 98.2 (03 Sep 2021 10:39), Max: 98.2 (03 Sep 2021 06:13)  HR: 81 (03 Sep 2021 10:39) (81 - 98)  BP: 115/78 (03 Sep 2021 10:39) (115/78 - 129/87)  BP(mean): --  RR: 17 (03 Sep 2021 10:39) (17 - 18)  SpO2: 99% (03 Sep 2021 10:39) (98% - 99%)    PHYSICAL EXAM:  GENERAL: NAD, well-groomed, well-developed  HEAD:  Atraumatic, Normocephalic  EYES: EOMI, PERRLA, conjunctiva and sclera clear  NECK: Supple, No JVD, Normal thyroid  NERVOUS SYSTEM:  Alert & Oriented X3, Good concentration; Motor Strength 5/5 B/L upper and lower extremities;  CHEST/LUNG: Clear to percussion bilaterally; No rales, rhonchi, wheezing, or rubs  HEART: Regular rate and rhythm; No murmurs, rubs, or gallops  ABDOMEN: Soft, Nontender, Nondistended; Bowel sounds present  EXTREMITIES:  2+ Peripheral Pulses, No clubbing, cyanosis, or edema  LYMPH: No lymphadenopathy noted  SKIN: No rashes or lesions    LAB  08-30 @ 07:20  amylase --   lipase 117                           10.6   3.44  )-----------( 209      ( 02 Sep 2021 06:21 )             35.0       CBC:  09-02 @ 06:21  WBC 3.44   Hgb 10.6   Hct 35.0   Plts 209  MCV 74.9  09-01 @ 10:33  WBC 2.61   Hgb 10.6   Hct 34.2   Plts 210  MCV 75.8  08-31 @ 06:58  WBC 5.33   Hgb 10.2   Hct 32.9   Plts 252  MCV 73.4  08-30 @ 07:20  WBC 9.25   Hgb 13.0   Hct 41.7   Plts 438  MCV 73.0      Chemistry:  09-02 @ 06:21  Na+ 138  K+ 4.1  Cl- 108  CO2 25  BUN 12  Cr 0.82     09-01 @ 10:33  Na+ 141  K+ 3.6  Cl- 107  CO2 28  BUN 13  Cr 1.08     08-31 @ 06:58  Na+ 141  K+ 3.5  Cl- 107  CO2 31  BUN 17  Cr 1.11     08-30 @ 11:13  Na+ 142  K+ 3.9  Cl- 105  CO2 25  BUN 23  Cr 1.44     08-30 @ 07:20  Na+ 138  K+ 3.8  Cl- 98  CO2 11  BUN 22  Cr 1.88         Glucose, Serum: 94 mg/dL (09-02 @ 06:21)  Glucose, Serum: 145 mg/dL (09-01 @ 10:33)  Glucose, Serum: 105 mg/dL (08-31 @ 06:58)  Glucose, Serum: 138 mg/dL (08-30 @ 11:13)  Glucose, Serum: 171 mg/dL (08-30 @ 07:20)      02 Sep 2021 06:21    138    |  108    |  12     ----------------------------<  94     4.1     |  25     |  0.82   01 Sep 2021 10:33    141    |  107    |  13     ----------------------------<  145    3.6     |  28     |  1.08   31 Aug 2021 06:58    141    |  107    |  17     ----------------------------<  105    3.5     |  31     |  1.11   30 Aug 2021 11:13    142    |  105    |  23     ----------------------------<  138    3.9     |  25     |  1.44   30 Aug 2021 07:20    138    |  98     |  22     ----------------------------<  171    3.8     |  11     |  1.88     Ca    8.8        02 Sep 2021 06:21  Ca    9.0        01 Sep 2021 10:33  Ca    8.0        31 Aug 2021 06:58  Ca    8.4        30 Aug 2021 11:13  Ca    8.9        30 Aug 2021 07:20  Phos  3.1       02 Sep 2021 06:21  Phos  2.4       01 Sep 2021 10:33  Phos  1.8       31 Aug 2021 06:58  Phos  2.1       30 Aug 2021 11:13  Mg     2.2       02 Sep 2021 06:21  Mg     2.4       01 Sep 2021 10:33  Mg     2.7       31 Aug 2021 06:58  Mg     2.9       30 Aug 2021 11:13    TPro  7.9    /  Alb  3.3    /  TBili  0.8    /  DBili  x      /  AST  45     /  ALT  29     /  AlkPhos  53     02 Sep 2021 06:21  TPro  8.1    /  Alb  3.3    /  TBili  0.6    /  DBili  x      /  AST  37     /  ALT  23     /  AlkPhos  53     01 Sep 2021 10:33  TPro  7.4    /  Alb  3.4    /  TBili  0.9    /  DBili  .17    /  AST  33     /  ALT  22     /  AlkPhos  52     31 Aug 2021 06:58  TPro  9.2    /  Alb  4.2    /  TBili  0.5    /  DBili  x      /  AST  32     /  ALT  27     /  AlkPhos  66     30 Aug 2021 07:20              CAPILLARY BLOOD GLUCOSE              RADIOLOGY & ADDITIONAL TESTS:      < from: CT Abdomen and Pelvis w/ Oral Cont and w/ IV Cont (09.02.21 @ 16:25) >    EXAM:  CT ABDOMEN AND PELVIS OC IC                          EXAM:  CT CHEST IC                            PROCEDURE DATE:  09/02/2021          INTERPRETATION:  CLINICAL INFORMATION: 56-year-old man with epigastric pain. History of ethanol abuse andDTs, alcohol  gastritis and esophagitis, pyloric ulcer disease, and hypoxic respiratory failure requiring intubation    COMPARISON: None.    CONTRAST/COMPLICATIONS:  IV Contrast: Omnipaque 350  90 cc administered   10 cc discarded  Oral Contrast: Omnipaque 300  Complications: None reported    PROCEDURE:  CT of the Chest, Abdomen and Pelvis was performed.  Sagittal and coronal reformats were performed.    FINDINGS:  CHEST:  LUNGS AND LARGE AIRWAYS: Patent central airways. Right lower lobe subpleural nodules similar to 10/20/2019. Unchanged right middle lobe subpleural nodule (6:54)  PLEURA: No pleural effusion.  VESSELS: Within normal limits.  HEART: Heart size is normal. No pericardial effusion.  MEDIASTINUM AND DANN: No lymphadenopathy.  CHEST WALL AND LOWER NECK: Within normal limits.    ABDOMEN AND PELVIS:  LIVER: Steatosis.  BILE DUCTS: Common duct dilated to 10 mm unchanged from 04/23/2021  GALLBLADDER: Within normal limits.  SPLEEN: Within normal limits.  PANCREAS: Within normal limits.  ADRENALS: Within normal limits.  KIDNEYS/URETERS: Within normal limits.    BLADDER: Within normal limits.  REPRODUCTIVE ORGANS: Normal size prostate    BOWEL: No bowel obstruction. Appendix normal. Mural thickening of the gastric wall along the greater curvature which may be secondary to gastritis. Mural thickening of the first duodenum also suggestive of inflammatory disease.  PERITONEUM: No ascites.  VESSELS: Within normal limits.  RETROPERITONEUM/LYMPH NODES: No lymphadenopathy.  ABDOMINAL WALL: Within normal limits.  BONES: Within normal limits.    IMPRESSION:  Mural thickening gastric wall and first duodenum suggestive of inflammation.  Chronic mild dilatation common duct.  Hepatic steatosis.        --- End of Report ---            RILEY CORTEZ MD; Attending Radiologist  This document has been electronically signed. Sep  2 2021  5:12PM    < end of copied text >  Imaging Personally Reviewed:  [ ] YES  [ ] NO    Consultant(s) Notes Reviewed:  [ ] YES  [ ] NO    Care Discussed with Consultants/Other Providers [ ] YES  [ ] NO
Patient is a 56y old  Male who presents with a chief complaint of alcohol withdrawal, alcoholic gastritic, ROSA (30 Aug 2021 09:44)      INTERVAL HPI/ OVERNIGHT EVENTS:   Patient seen and examined bedside.   no overnight events   states unable to swallow pills or eat regular food due to throat and stomach pain     Denies fever, chills, N/V, dizziness, HA, cough, CP, palpitations, SOB, abdominal pain, dysuria, diarrhea, constipation.     MEDICATIONS  (STANDING):  atorvastatin 20 milliGRAM(s) Oral at bedtime  folic acid 1 milliGRAM(s) Oral daily  heparin   Injectable 5000 Unit(s) SubCutaneous every 12 hours  lactated ringers. 1000 milliLiter(s) (75 mL/Hr) IV Continuous <Continuous>  LORazepam   Injectable 1.5 milliGRAM(s) IV Push every 4 hours  LORazepam   Injectable   IV Push   multivitamin 1 Tablet(s) Oral daily  pantoprazole  Injectable 40 milliGRAM(s) IV Push daily  potassium phosphate IVPB 15 milliMole(s) IV Intermittent once  thiamine 100 milliGRAM(s) Oral <User Schedule>    MEDICATIONS  (PRN):  acetaminophen   Tablet .. 650 milliGRAM(s) Oral every 6 hours PRN Temp greater or equal to 38.5C (101.3F), Mild Pain (1 - 3), Moderate Pain (4 - 6)  melatonin 3 milliGRAM(s) Oral at bedtime PRN Insomnia  ondansetron Injectable 4 milliGRAM(s) IV Push every 8 hours PRN Nausea and/or Vomiting  oxyCODONE    IR 5 milliGRAM(s) Oral every 6 hours PRN Severe Pain (7 - 10)      Allergies    No Known Allergies    Intolerances            Vital Signs Last 24 Hrs  T(C): 36.7 (02 Sep 2021 11:19), Max: 36.8 (01 Sep 2021 17:34)  T(F): 98.1 (02 Sep 2021 11:19), Max: 98.2 (01 Sep 2021 17:34)  HR: 98 (02 Sep 2021 11:19) (54 - 98)  BP: 128/90 (02 Sep 2021 11:19) (119/71 - 134/89)  BP(mean): --  RR: 19 (02 Sep 2021 11:19) (17 - 19)  SpO2: 98% (02 Sep 2021 11:19) (96% - 99%)    PHYSICAL EXAM:  GENERAL: NAD, no increased WOB   HEAD:  Atraumatic, Normocephalic  EYES: conjunctiva and sclera clear  ENMT: Moist mucous membranes  NECK: Supple, No JVD  CHEST/LUNG: Clear to Auscultation bilaterally; No rales, rhonchi, wheezing, or rubs  HEART: Regular rate and rhythm; No murmurs, rubs, or gallops  ABDOMEN: Soft, tender to palpation of epigastric area; , Nondistended; Bowel sounds present  EXTREMITIES:  2+ Peripheral Pulses b/l, No clubbing, cyanosis, calf tenderness or edema b/l   SKIN: No rashes or lesions  NERVOUS SYSTEM:  Alert & Oriented X3, no tremors, no focal neuro deficits     LABS:                                              10.6   3.44  )-----------( 209      ( 02 Sep 2021 06:21 )             35.0   09-02    138  |  108  |  12  ----------------------------<  94  4.1   |  25  |  0.82    Ca    8.8      02 Sep 2021 06:21  Phos  3.1     09-  Mg     2.2     09-02    TPro  7.9  /  Alb  3.3  /  TBili  0.8  /  DBili  x   /  AST  45<H>  /  ALT  29  /  AlkPhos  53  09-02           Urinalysis Basic - ( 30 Aug 2021 10:58 )    Color: Yellow / Appearance: Clear / S.030 / pH: x  Gluc: x / Ketone: Moderate  / Bili: Negative / Urobili: Negative mg/dL   Blood: x / Protein: 30 mg/dL / Nitrite: Negative   Leuk Esterase: Negative / RBC: 0-2 /HPF / WBC 0-2   Sq Epi: x / Non Sq Epi: Moderate / Bacteria: Few      CAPILLARY BLOOD GLUCOSE              RADIOLOGY & ADDITIONAL TESTS:          Imaging Personally Reviewed:  [ ] YES  [ ] NO    Consultant(s) Notes Reviewed:  [x ] YES  [ ] NO    Care Discussed with Consultants/Other Providers [x ] YES  [ ] NO  Care discussed in detail with patient.  All questions and concerns addressed
Patient is a 56y old  Male who presents with a chief complaint of alcohol withdrawal, alcoholic gastritic, ROSA (30 Aug 2021 09:44)      INTERVAL HPI/ OVERNIGHT EVENTS:   Patient seen and examined bedside.   no overnight events     MEDICATIONS  (STANDING):  atorvastatin 20 milliGRAM(s) Oral at bedtime  folic acid 1 milliGRAM(s) Oral daily  heparin   Injectable 5000 Unit(s) SubCutaneous every 12 hours  lactated ringers. 1000 milliLiter(s) (75 mL/Hr) IV Continuous <Continuous>  LORazepam   Injectable 1.5 milliGRAM(s) IV Push every 4 hours  LORazepam   Injectable   IV Push   multivitamin 1 Tablet(s) Oral daily  pantoprazole  Injectable 40 milliGRAM(s) IV Push daily  potassium phosphate IVPB 15 milliMole(s) IV Intermittent once  thiamine 100 milliGRAM(s) Oral <User Schedule>    MEDICATIONS  (PRN):  acetaminophen   Tablet .. 650 milliGRAM(s) Oral every 6 hours PRN Temp greater or equal to 38.5C (101.3F), Mild Pain (1 - 3), Moderate Pain (4 - 6)  melatonin 3 milliGRAM(s) Oral at bedtime PRN Insomnia  ondansetron Injectable 4 milliGRAM(s) IV Push every 8 hours PRN Nausea and/or Vomiting  oxyCODONE    IR 5 milliGRAM(s) Oral every 6 hours PRN Severe Pain (7 - 10)      Allergies    No Known Allergies    Intolerances            Vital Signs Last 24 Hrs  T(C): 36.8 (01 Sep 2021 17:34), Max: 37 (31 Aug 2021 23:52)  T(F): 98.2 (01 Sep 2021 17:34), Max: 98.6 (31 Aug 2021 23:52)  HR: 76 (01 Sep 2021 17:34) (71 - 90)  BP: 132/88 (01 Sep 2021 17:34) (116/69 - 132/88)  BP(mean): --  RR: 17 (01 Sep 2021 17:34) (17 - 19)  SpO2: 96% (01 Sep 2021 17:34) (96% - 98%)    PHYSICAL EXAM:  GENERAL: NAD, appears Nausea  HEAD:  Atraumatic, Normocephalic  EYES: conjunctiva and sclera clear  ENMT: Moist mucous membranes  NECK: Supple, No JVD, Normal thyroid  CHEST/LUNG: Clear to Auscultation bilaterally; No rales, rhonchi, wheezing, or rubs  HEART: Regular rate and rhythm; No murmurs, rubs, or gallops  ABDOMEN: Soft, Nontender, Nondistended; Bowel sounds present  EXTREMITIES:  2+ Peripheral Pulses, No clubbing, cyanosis, or edema  SKIN: No rashes or lesions  NERVOUS SYSTEM:  Alert & Oriented X3, +tremulous,  Motor Strength 5/5 B/L upper and lower extremities    LABS:                                   10.6   2.61  )-----------( 210      ( 01 Sep 2021 10:33 )             34.2       141  |  107  |  13  ----------------------------<  145<H>  3.6   |  28  |  1.08    Ca    9.0      01 Sep 2021 10:33  Phos  2.4       Mg     2.4         TPro  8.1  /  Alb  3.3  /  TBili  0.6  /  DBili  x   /  AST  37  /  ALT  23  /  AlkPhos  53  -           Urinalysis Basic - ( 30 Aug 2021 10:58 )    Color: Yellow / Appearance: Clear / S.030 / pH: x  Gluc: x / Ketone: Moderate  / Bili: Negative / Urobili: Negative mg/dL   Blood: x / Protein: 30 mg/dL / Nitrite: Negative   Leuk Esterase: Negative / RBC: 0-2 /HPF / WBC 0-2   Sq Epi: x / Non Sq Epi: Moderate / Bacteria: Few      CAPILLARY BLOOD GLUCOSE              RADIOLOGY & ADDITIONAL TESTS:          Imaging Personally Reviewed:  [ ] YES  [ ] NO    Consultant(s) Notes Reviewed:  [x ] YES  [ ] NO    Care Discussed with Consultants/Other Providers [x ] YES  [ ] NO  Care discussed in detail with patient.  All questions and concerns addressed
Patient is a 56y old  Male who presents with a chief complaint of alcohol withdrawal, alcoholic gastritic, ROSA (30 Aug 2021 09:44)      INTERVAL HPI/ OVERNIGHT EVENTS: Pt was seen and examined at bedside today, No significant overnight events, pt continues to have nausea/vomiting, pt admits to tremors      MEDICATIONS  (STANDING):  atorvastatin 20 milliGRAM(s) Oral at bedtime  folic acid 1 milliGRAM(s) Oral daily  heparin   Injectable 5000 Unit(s) SubCutaneous every 12 hours  lactated ringers. 1000 milliLiter(s) (75 mL/Hr) IV Continuous <Continuous>  LORazepam   Injectable 1.5 milliGRAM(s) IV Push every 4 hours  LORazepam   Injectable   IV Push   multivitamin 1 Tablet(s) Oral daily  pantoprazole  Injectable 40 milliGRAM(s) IV Push daily  potassium phosphate IVPB 15 milliMole(s) IV Intermittent once  thiamine 100 milliGRAM(s) Oral <User Schedule>    MEDICATIONS  (PRN):  acetaminophen   Tablet .. 650 milliGRAM(s) Oral every 6 hours PRN Temp greater or equal to 38.5C (101.3F), Mild Pain (1 - 3), Moderate Pain (4 - 6)  melatonin 3 milliGRAM(s) Oral at bedtime PRN Insomnia  ondansetron Injectable 4 milliGRAM(s) IV Push every 8 hours PRN Nausea and/or Vomiting  oxyCODONE    IR 5 milliGRAM(s) Oral every 6 hours PRN Severe Pain (7 - 10)      Allergies    No Known Allergies    Intolerances        REVIEW OF SYSTEMS:    Unable to examine due to [ ] Encephalopathy [ ] Advanced Dementia [ ] Expressive Aphasia [ ] Non-verbal patient    CONSTITUTIONAL: No fever, NO generalized weakness/Fatigue, No weight loss  EYES: No eye pain, visual disturbances, or discharge  ENMT:  No difficulty hearing, tinnitus, vertigo; No sinus or throat pain  NECK: No pain or stiffness  RESPIRATORY: No shortness of breath,  cough, wheezing, sputum or hemoptysis   CARDIOVASCULAR: No chest pain, palpitations, or leg swelling  GASTROINTESTINAL: positive Nausea/vomiting, No abdominal pain. diarrhea or constipation. No melena or hematochezia.  GENITOURINARY: No dysuria, frequency, hematuria, or incontinence  NEUROLOGICAL: No headaches, Dizziness, memory loss, loss of strength, numbness, or tremors  SKIN: No itching, burning, rashes, or lesions   MUSCULOSKELETAL: No joint pain or swelling; No muscle, back, or extremity pain  PSYCHIATRIC: No depression, anxiety, mood swings, or difficulty sleeping  HEME/LYMPH: No easy bruising, or bleeding gums      Vital Signs Last 24 Hrs  T(C): 36.8 (31 Aug 2021 05:17), Max: 37.7 (30 Aug 2021 16:42)  T(F): 98.2 (31 Aug 2021 05:17), Max: 99.9 (30 Aug 2021 16:42)  HR: 92 (31 Aug 2021 05:17) (92 - 123)  BP: 128/81 (31 Aug 2021 05:17) (128/81 - 146/88)  BP(mean): --  RR: 18 (31 Aug 2021 05:17) (15 - 18)  SpO2: 97% (31 Aug 2021 05:17) (96% - 97%)    PHYSICAL EXAM:  GENERAL: NAD, appears Nausea  HEAD:  Atraumatic, Normocephalic  EYES: conjunctiva and sclera clear  ENMT: Moist mucous membranes  NECK: Supple, No JVD, Normal thyroid  CHEST/LUNG: Clear to Auscultation bilaterally; No rales, rhonchi, wheezing, or rubs  HEART: Regular rate and rhythm; No murmurs, rubs, or gallops  ABDOMEN: Soft, Nontender, Nondistended; Bowel sounds present  EXTREMITIES:  2+ Peripheral Pulses, No clubbing, cyanosis, or edema  SKIN: No rashes or lesions  NERVOUS SYSTEM:  Alert & Oriented X3, +tremulous,  Motor Strength 5/5 B/L upper and lower extremities    LABS:                        10.2   5.33  )-----------( 252      ( 31 Aug 2021 06:58 )             32.9     08    141  |  107  |  17  ----------------------------<  105<H>  3.5   |  31  |  1.11    Ca    8.0<L>      31 Aug 2021 06:58  Phos  1.8       Mg     2.7         TPro  7.4  /  Alb  3.4  /  TBili  0.9  /  DBili  .17  /  AST  33  /  ALT  22  /  AlkPhos  52      PT/INR - ( 30 Aug 2021 11:13 )   PT: 11.7 sec;   INR: 1.01 ratio           Urinalysis Basic - ( 30 Aug 2021 10:58 )    Color: Yellow / Appearance: Clear / S.030 / pH: x  Gluc: x / Ketone: Moderate  / Bili: Negative / Urobili: Negative mg/dL   Blood: x / Protein: 30 mg/dL / Nitrite: Negative   Leuk Esterase: Negative / RBC: 0-2 /HPF / WBC 0-2   Sq Epi: x / Non Sq Epi: Moderate / Bacteria: Few      CAPILLARY BLOOD GLUCOSE              RADIOLOGY & ADDITIONAL TESTS:          Imaging Personally Reviewed:  [ ] YES  [ ] NO    Consultant(s) Notes Reviewed:  [ ] YES  [ ] NO    Care Discussed with Consultants/Other Providers [x ] YES  [ ] NO

## 2021-09-03 NOTE — CHART NOTE - NSCHARTNOTEFT_GEN_A_CORE
Patient seen and examined. Full note to follow===   1) Tolerating full liquid diet. The patient denies melena, hematochezia, hematemesis, nausea, vomiting, abdominal pain, constipation, diarrhea, or change in bowel movements  Will advance diet and observe  2) Discussed with speech therapist - Patient has a history of difficulty swallowing pills in the past. Waxes and wanes. Probably related to ETOH abuse, She will reconsult patient.

## 2021-09-03 NOTE — PROGRESS NOTE ADULT - ASSESSMENT
56 years old male with h/o hronic ETOH abuse and dependence (1 bottle of Vodka a day) with long standing hx of alcohol withdrawal and DTs with frequent hospital/ICU admissions, alcoholic gastritis/esophagitis, PUD, HLD, hx of hypoxic respiratory failure requiring intubation due to aspiration PNA (most recent intubation Aril 2020),  staph PNA, COVID-19 infection Dec 2020 present to ED with complain of N/V and epigastric pain for last few days  Admitted for ETOH withdrawal, alcoholic gastritis, ROSA, AGAP metabolic acidosis
HPI:  56 years old male with h/o hronic ETOH abuse and dependence (1 bottle of Vodka a day) with long standing hx of alcohol withdrawal and DTs with frequent hospital/ICU admissions, alcoholic gastritis/esophagitis, PUD, HLD, hx of hypoxic respiratory failure requiring intubation due to aspiration PNA (most recent intubation Aril 2020),  staph PNA, COVID-19 infection Dec 2020 present to ED with complain of N/V and epigastric pain for last few days. Patient reported multiple episode of non bloody vomiting for last 2 days, unable to tolerate any oral intake. He reported intermittent, 10/10 burning epigastric pain, wrosen with vomiting and no radiation. He feels like he is shaky and withdrawing from alcohol. Reported last drink was more than 1 week ago to me but in ED, he reported last drink was yesterday.   Tachycardic to 130s, hypertensive in ED. Appear shaky. Labs with AGAP metabolic acidosis, bicarb 11, AGAP 29, Cr 1.88 ( baseline around 1), lipase normal, LFT normal, ETOH level 103. Patient was given NS 1L , diazepam 10mg IV, ativan 2mg IV, thiamine injection, IV magnesium 2gram, zofran in ED  Admitted for ETOH withdrawal, alcoholic gastritis, ROSA (30 Aug 2021 09:44)  -------- As Above ----------- Patient is a poor historian  Consult called for N/V,  abdominal pain. Patient known to my service. History of ETOH abuse. Occasionally presents with N/V and abdominal pain when drinking. Work ups in the past have been negative.   Patient feeling much better today. Tolerating liquids Abdominal pain almost completely gone. ( Now a vague discomfort ) Denies NSAIDs.   Patient now also complaining of difficulty swallowing pills. It needs to be chopped  Has a microcytic anemia. The patient denies melena, hematochezia, hematemesis, nausea, vomiting, abdominal pain, constipation, diarrhea, or change in bowel movements prior to his last binge drinking.   Last colonoscopy ?    N/V, abdominal pain - Significantly better. Probably from alcoholic gastritis , See CT scan - 1)Advance diet as tolerated  Microcytic anemia - 1) Fe/TIBC/Ferritin 2) Hemoglobin electrophoresis 3) Patient refusing GI w/u at the present time   Dysphagia, pill - See Chest CT / speech evaluation pending  
56 years old male with h/o hronic ETOH abuse and dependence (1 bottle of Vodka a day) with long standing hx of alcohol withdrawal and DTs with frequent hospital/ICU admissions, alcoholic gastritis/esophagitis, PUD, HLD, hx of hypoxic respiratory failure requiring intubation due to aspiration PNA (most recent intubation Aril 2020),  staph PNA, COVID-19 infection Dec 2020 present to ED with complain of N/V and epigastric pain for last few days  Admitted for ETOH withdrawal, alcoholic gastritis, ROSA, AGAP metabolic acidosis
56 years old male with h/o hronic ETOH abuse and dependence (1 bottle of Vodka a day) with long standing hx of alcohol withdrawal and DTs with frequent hospital/ICU admissions, alcoholic gastritis/esophagitis, PUD, HLD, hx of hypoxic respiratory failure requiring intubation due to aspiration PNA (most recent intubation Aril 2020),  staph PNA, COVID-19 infection Dec 2020 present to ED with complain of N/V and epigastric pain for last few days  Admitted for ETOH withdrawal, alcoholic gastritis, ROSA, AGAP metabolic acidosis

## 2021-09-03 NOTE — DISCHARGE NOTE PROVIDER - NSDCFUADDINST_GEN_ALL_CORE_FT
It is important to see your primary physician as well as other necessary consultants within the next week to perform a comprehensive medical review.  Call their offices for an appointment as soon as you leave the hospital.  If you do not have a primary physician or cant reach him/her, contact the [facility] Physician Referral Service at [phone].  Your medical issues appear to be stable at this time, but if your symptoms recur or worsen, contact your physicians and/or return to the hospital if necessary.  If you encounter any issues or questions with your medication, call your physicians before stopping the medication.

## 2021-09-06 DIAGNOSIS — Z86.16 PERSONAL HISTORY OF COVID-19: ICD-10-CM

## 2021-09-06 DIAGNOSIS — F19.19 OTHER PSYCHOACTIVE SUBSTANCE ABUSE WITH UNSPECIFIED PSYCHOACTIVE SUBSTANCE-INDUCED DISORDER: ICD-10-CM

## 2021-09-06 DIAGNOSIS — K29.20 ALCOHOLIC GASTRITIS WITHOUT BLEEDING: ICD-10-CM

## 2021-09-06 DIAGNOSIS — K27.9 PEPTIC ULCER, SITE UNSPECIFIED, UNSPECIFIED AS ACUTE OR CHRONIC, WITHOUT HEMORRHAGE OR PERFORATION: ICD-10-CM

## 2021-09-06 DIAGNOSIS — Z87.01 PERSONAL HISTORY OF PNEUMONIA (RECURRENT): ICD-10-CM

## 2021-09-06 DIAGNOSIS — Z29.9 ENCOUNTER FOR PROPHYLACTIC MEASURES, UNSPECIFIED: ICD-10-CM
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
## 2021-09-06 DIAGNOSIS — E78.2 MIXED HYPERLIPIDEMIA: ICD-10-CM

## 2021-09-06 DIAGNOSIS — R11.10 VOMITING, UNSPECIFIED: ICD-10-CM

## 2021-09-06 DIAGNOSIS — F10.231 ALCOHOL DEPENDENCE WITH WITHDRAWAL DELIRIUM: ICD-10-CM

## 2021-09-06 DIAGNOSIS — R11.2 NAUSEA WITH VOMITING, UNSPECIFIED: ICD-10-CM

## 2021-09-06 DIAGNOSIS — N17.9 ACUTE KIDNEY FAILURE, UNSPECIFIED: ICD-10-CM

## 2021-09-06 DIAGNOSIS — E87.2 ACIDOSIS: ICD-10-CM

## 2021-09-06 DIAGNOSIS — Z87.09 PERSONAL HISTORY OF OTHER DISEASES OF THE RESPIRATORY SYSTEM: ICD-10-CM

## 2021-09-11 ENCOUNTER — EMERGENCY (EMERGENCY)
Facility: HOSPITAL | Age: 56
LOS: 0 days | Discharge: HOME | End: 2021-09-11
Attending: STUDENT IN AN ORGANIZED HEALTH CARE EDUCATION/TRAINING PROGRAM
Payer: MEDICAID

## 2021-09-11 VITALS
WEIGHT: 149.91 LBS | DIASTOLIC BLOOD PRESSURE: 88 MMHG | TEMPERATURE: 99 F | HEART RATE: 86 BPM | SYSTOLIC BLOOD PRESSURE: 126 MMHG | HEIGHT: 67 IN | OXYGEN SATURATION: 98 % | RESPIRATION RATE: 16 BRPM

## 2021-09-11 DIAGNOSIS — Z87.11 PERSONAL HISTORY OF PEPTIC ULCER DISEASE: ICD-10-CM
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
## 2021-09-11 DIAGNOSIS — E78.2 MIXED HYPERLIPIDEMIA: ICD-10-CM

## 2021-09-11 DIAGNOSIS — Z86.59 PERSONAL HISTORY OF OTHER MENTAL AND BEHAVIORAL DISORDERS: ICD-10-CM

## 2021-09-11 DIAGNOSIS — R10.10 UPPER ABDOMINAL PAIN, UNSPECIFIED: ICD-10-CM

## 2021-09-11 DIAGNOSIS — F10.229 ALCOHOL DEPENDENCE WITH INTOXICATION, UNSPECIFIED: ICD-10-CM

## 2021-09-11 PROCEDURE — 99284 EMERGENCY DEPT VISIT MOD MDM: CPT

## 2021-09-11 RX ORDER — ONDANSETRON 8 MG/1
4 TABLET, FILM COATED ORAL ONCE
Refills: 0 | Status: DISCONTINUED | OUTPATIENT
Start: 2021-09-11 | End: 2021-09-11

## 2021-09-11 RX ORDER — ONDANSETRON 8 MG/1
8 TABLET, FILM COATED ORAL ONCE
Refills: 0 | Status: DISCONTINUED | OUTPATIENT
Start: 2021-09-11 | End: 2021-09-11

## 2021-09-11 RX ORDER — FAMOTIDINE 10 MG/ML
20 INJECTION INTRAVENOUS ONCE
Refills: 0 | Status: COMPLETED | OUTPATIENT
Start: 2021-09-11 | End: 2021-09-11

## 2021-09-11 RX ORDER — ONDANSETRON 8 MG/1
8 TABLET, FILM COATED ORAL ONCE
Refills: 0 | Status: COMPLETED | OUTPATIENT
Start: 2021-09-11 | End: 2021-09-11

## 2021-09-11 RX ADMIN — ONDANSETRON 8 MILLIGRAM(S): 8 TABLET, FILM COATED ORAL at 20:45

## 2021-09-11 NOTE — ED PROVIDER NOTE - PSYCHIATRIC, MLM
Alert and oriented to person, place, time/situation. normal mood and affect. no apparent risk to self or others. intoxicated

## 2021-09-11 NOTE — ED PROVIDER NOTE - CLINICAL SUMMARY MEDICAL DECISION MAKING FREE TEXT BOX
patient well known to me from multiple prior ed visits. appears at his baseline. abdominal pain is chronic with many ED evaluations. symptomatic care provided with relief of symptoms. patient observed for relative sobriety to being a safe discharge. prophylactic librium given as aptient has a history of quick eto withdrawal and severe symptoms.

## 2021-09-11 NOTE — ED PROVIDER NOTE - OBJECTIVE STATEMENT
56 years old male with h/o hronic ETOH abuse and dependence (1 bottle of Vodka a day) with long standing hx of alcohol withdrawal and DTs with frequent hospital/ICU admissions, alcoholic gastritis/esophagitis, PUD, HLD, hx of hypoxic respiratory failure requiring intubation due to aspiration PNA presenting for upper abdominal pain, nausea and vomiting today. drank etoh today.

## 2021-09-11 NOTE — ED PROVIDER NOTE - PATIENT PORTAL LINK FT
You can access the FollowMyHealth Patient Portal offered by [facility] by registering at the following website: [email or web address]. By joining [employer]’s FollowMyHealth portal, you will also be able to view your health information using other applications (apps) compatible with our system.

## 2021-09-11 NOTE — ED PROVIDER NOTE - CONSTITUTIONAL, MLM
normal... mildly intoxicated appearing, awake, alert, oriented to person, place, time/situation and in no apparent distress.

## 2021-09-11 NOTE — ED ADULT NURSE NOTE - OBJECTIVE STATEMENT
Pt BIBA c/o generalized abdominal pain and alcohol intoxication. States drank alcohol all day. Denies chest pain, sob, and headaches.

## 2021-09-18 ENCOUNTER — INPATIENT (INPATIENT)
Facility: HOSPITAL | Age: 56
LOS: 0 days | Discharge: HOME | End: 2021-09-19
Attending: HOSPITALIST | Admitting: HOSPITALIST
Payer: MEDICAID

## 2021-09-18 VITALS
SYSTOLIC BLOOD PRESSURE: 164 MMHG | TEMPERATURE: 98 F | RESPIRATION RATE: 17 BRPM | OXYGEN SATURATION: 96 % | HEART RATE: 129 BPM | DIASTOLIC BLOOD PRESSURE: 99 MMHG | WEIGHT: 160.06 LBS | HEIGHT: 67 IN

## 2021-09-18 LAB
ALBUMIN SERPL ELPH-MCNC: 3.3 G/DL — SIGNIFICANT CHANGE UP (ref 3.3–5)
ALBUMIN SERPL ELPH-MCNC: 4.1 G/DL — SIGNIFICANT CHANGE UP (ref 3.3–5)
ALP SERPL-CCNC: 46 U/L — SIGNIFICANT CHANGE UP (ref 40–120)
ALP SERPL-CCNC: 55 U/L — SIGNIFICANT CHANGE UP (ref 40–120)
ALT FLD-CCNC: 63 U/L — SIGNIFICANT CHANGE UP (ref 12–78)
ALT FLD-CCNC: 79 U/L — HIGH (ref 12–78)
ANION GAP SERPL CALC-SCNC: 10 MMOL/L — SIGNIFICANT CHANGE UP (ref 5–17)
ANION GAP SERPL CALC-SCNC: 21 MMOL/L — HIGH (ref 5–17)
ANISOCYTOSIS BLD QL: SLIGHT — SIGNIFICANT CHANGE UP
AST SERPL-CCNC: 51 U/L — HIGH (ref 15–37)
AST SERPL-CCNC: 78 U/L — HIGH (ref 15–37)
BASE EXCESS BLDV CALC-SCNC: -6.2 MMOL/L — LOW (ref -2–2)
BASOPHILS # BLD AUTO: 0 K/UL — SIGNIFICANT CHANGE UP (ref 0–0.2)
BASOPHILS NFR BLD AUTO: 0 % — SIGNIFICANT CHANGE UP (ref 0–2)
BILIRUB DIRECT SERPL-MCNC: 0.08 MG/DL — SIGNIFICANT CHANGE UP (ref 0.05–0.2)
BILIRUB DIRECT SERPL-MCNC: 0.11 MG/DL — SIGNIFICANT CHANGE UP (ref 0.05–0.2)
BILIRUB INDIRECT FLD-MCNC: 0.2 MG/DL — SIGNIFICANT CHANGE UP (ref 0.2–1)
BILIRUB INDIRECT FLD-MCNC: 0.2 MG/DL — SIGNIFICANT CHANGE UP (ref 0.2–1)
BILIRUB SERPL-MCNC: 0.3 MG/DL — SIGNIFICANT CHANGE UP (ref 0.2–1.2)
BILIRUB SERPL-MCNC: 0.3 MG/DL — SIGNIFICANT CHANGE UP (ref 0.2–1.2)
BLOOD GAS COMMENTS, VENOUS: SIGNIFICANT CHANGE UP
BUN SERPL-MCNC: 10 MG/DL — SIGNIFICANT CHANGE UP (ref 7–23)
BUN SERPL-MCNC: 13 MG/DL — SIGNIFICANT CHANGE UP (ref 7–23)
CALCIUM SERPL-MCNC: 7.6 MG/DL — LOW (ref 8.5–10.1)
CALCIUM SERPL-MCNC: 8.9 MG/DL — SIGNIFICANT CHANGE UP (ref 8.5–10.1)
CHLORIDE SERPL-SCNC: 105 MMOL/L — SIGNIFICANT CHANGE UP (ref 96–108)
CHLORIDE SERPL-SCNC: 112 MMOL/L — HIGH (ref 96–108)
CO2 SERPL-SCNC: 18 MMOL/L — LOW (ref 22–31)
CO2 SERPL-SCNC: 22 MMOL/L — SIGNIFICANT CHANGE UP (ref 22–31)
CREAT SERPL-MCNC: 0.86 MG/DL — SIGNIFICANT CHANGE UP (ref 0.5–1.3)
CREAT SERPL-MCNC: 0.88 MG/DL — SIGNIFICANT CHANGE UP (ref 0.5–1.3)
DACRYOCYTES BLD QL SMEAR: SLIGHT — SIGNIFICANT CHANGE UP
ELLIPTOCYTES BLD QL SMEAR: SLIGHT — SIGNIFICANT CHANGE UP
EOSINOPHIL # BLD AUTO: 0 K/UL — SIGNIFICANT CHANGE UP (ref 0–0.5)
EOSINOPHIL NFR BLD AUTO: 0 % — SIGNIFICANT CHANGE UP (ref 0–6)
FLUAV AG NPH QL: SIGNIFICANT CHANGE UP
FLUBV AG NPH QL: SIGNIFICANT CHANGE UP
GAS PNL BLDV: SIGNIFICANT CHANGE UP
GLUCOSE SERPL-MCNC: 130 MG/DL — HIGH (ref 70–99)
GLUCOSE SERPL-MCNC: 83 MG/DL — SIGNIFICANT CHANGE UP (ref 70–99)
HCO3 BLDV-SCNC: 18 MMOL/L — LOW (ref 21–29)
HCT VFR BLD CALC: 38.7 % — LOW (ref 39–50)
HGB BLD-MCNC: 12 G/DL — LOW (ref 13–17)
HOROWITZ INDEX BLDV+IHG-RTO: 21 — SIGNIFICANT CHANGE UP
LACTATE SERPL-SCNC: 3.9 MMOL/L — HIGH (ref 0.7–2)
LACTATE SERPL-SCNC: 4.5 MMOL/L — CRITICAL HIGH (ref 0.7–2)
LACTATE SERPL-SCNC: 8 MMOL/L — CRITICAL HIGH (ref 0.7–2)
LIDOCAIN IGE QN: 186 U/L — SIGNIFICANT CHANGE UP (ref 73–393)
LYMPHOCYTES # BLD AUTO: 0.99 K/UL — LOW (ref 1–3.3)
LYMPHOCYTES # BLD AUTO: 49 % — HIGH (ref 13–44)
MACROCYTES BLD QL: SLIGHT — SIGNIFICANT CHANGE UP
MAGNESIUM SERPL-MCNC: 1.8 MG/DL — SIGNIFICANT CHANGE UP (ref 1.6–2.6)
MAGNESIUM SERPL-MCNC: 2.2 MG/DL — SIGNIFICANT CHANGE UP (ref 1.6–2.6)
MANUAL SMEAR VERIFICATION: SIGNIFICANT CHANGE UP
MCHC RBC-ENTMCNC: 23.4 PG — LOW (ref 27–34)
MCHC RBC-ENTMCNC: 31 GM/DL — LOW (ref 32–36)
MCV RBC AUTO: 75.6 FL — LOW (ref 80–100)
MICROCYTES BLD QL: SLIGHT — SIGNIFICANT CHANGE UP
MONOCYTES # BLD AUTO: 0.1 K/UL — SIGNIFICANT CHANGE UP (ref 0–0.9)
MONOCYTES NFR BLD AUTO: 5 % — SIGNIFICANT CHANGE UP (ref 2–14)
NEUTROPHILS # BLD AUTO: 0.93 K/UL — LOW (ref 1.8–7.4)
NEUTROPHILS NFR BLD AUTO: 46 % — SIGNIFICANT CHANGE UP (ref 43–77)
NRBC # BLD: 0 /100 — SIGNIFICANT CHANGE UP (ref 0–0)
NRBC # BLD: SIGNIFICANT CHANGE UP /100 WBCS (ref 0–0)
OSMOLALITY SERPL: 366 MOSMOL/KG — HIGH (ref 275–300)
PCO2 BLDV: 35 MMHG — SIGNIFICANT CHANGE UP (ref 35–50)
PH BLDV: 7.34 — LOW (ref 7.35–7.45)
PHOSPHATE SERPL-MCNC: 1.8 MG/DL — LOW (ref 2.5–4.5)
PHOSPHATE SERPL-MCNC: 3.3 MG/DL — SIGNIFICANT CHANGE UP (ref 2.5–4.5)
PLAT MORPH BLD: NORMAL — SIGNIFICANT CHANGE UP
PLATELET # BLD AUTO: 280 K/UL — SIGNIFICANT CHANGE UP (ref 150–400)
PO2 BLDV: 63 MMHG — HIGH (ref 25–45)
POIKILOCYTOSIS BLD QL AUTO: SLIGHT — SIGNIFICANT CHANGE UP
POTASSIUM SERPL-MCNC: 3.8 MMOL/L — SIGNIFICANT CHANGE UP (ref 3.5–5.3)
POTASSIUM SERPL-MCNC: 4.1 MMOL/L — SIGNIFICANT CHANGE UP (ref 3.5–5.3)
POTASSIUM SERPL-SCNC: 3.8 MMOL/L — SIGNIFICANT CHANGE UP (ref 3.5–5.3)
POTASSIUM SERPL-SCNC: 4.1 MMOL/L — SIGNIFICANT CHANGE UP (ref 3.5–5.3)
PROT SERPL-MCNC: 6.9 GM/DL — SIGNIFICANT CHANGE UP (ref 6–8.3)
PROT SERPL-MCNC: 8.1 GM/DL — SIGNIFICANT CHANGE UP (ref 6–8.3)
RBC # BLD: 5.12 M/UL — SIGNIFICANT CHANGE UP (ref 4.2–5.8)
RBC # FLD: 22.4 % — HIGH (ref 10.3–14.5)
RBC BLD AUTO: ABNORMAL
SAO2 % BLDV: 87 % — SIGNIFICANT CHANGE UP (ref 67–88)
SARS-COV-2 RNA SPEC QL NAA+PROBE: SIGNIFICANT CHANGE UP
SODIUM SERPL-SCNC: 144 MMOL/L — SIGNIFICANT CHANGE UP (ref 135–145)
SODIUM SERPL-SCNC: 144 MMOL/L — SIGNIFICANT CHANGE UP (ref 135–145)
TARGETS BLD QL SMEAR: SLIGHT — SIGNIFICANT CHANGE UP
TROPONIN I SERPL-MCNC: <.015 NG/ML — SIGNIFICANT CHANGE UP (ref 0.01–0.04)
WBC # BLD: 2.02 K/UL — LOW (ref 3.8–10.5)
WBC # FLD AUTO: 2.02 K/UL — LOW (ref 3.8–10.5)

## 2021-09-18 PROCEDURE — 93010 ELECTROCARDIOGRAM REPORT: CPT

## 2021-09-18 PROCEDURE — 99285 EMERGENCY DEPT VISIT HI MDM: CPT

## 2021-09-18 PROCEDURE — 74174 CTA ABD&PLVS W/CONTRAST: CPT | Mod: 26,MA

## 2021-09-18 PROCEDURE — 99222 1ST HOSP IP/OBS MODERATE 55: CPT

## 2021-09-18 PROCEDURE — 71045 X-RAY EXAM CHEST 1 VIEW: CPT | Mod: 26

## 2021-09-18 RX ORDER — CALCIUM CARBONATE 500(1250)
1 TABLET ORAL ONCE
Refills: 0 | Status: COMPLETED | OUTPATIENT
Start: 2021-09-18 | End: 2021-09-18

## 2021-09-18 RX ORDER — SODIUM CHLORIDE 9 MG/ML
1000 INJECTION INTRAMUSCULAR; INTRAVENOUS; SUBCUTANEOUS ONCE
Refills: 0 | Status: COMPLETED | OUTPATIENT
Start: 2021-09-18 | End: 2021-09-18

## 2021-09-18 RX ORDER — LIDOCAINE 4 G/100G
10 CREAM TOPICAL ONCE
Refills: 0 | Status: DISCONTINUED | OUTPATIENT
Start: 2021-09-18 | End: 2021-09-18

## 2021-09-18 RX ORDER — HEPARIN SODIUM 5000 [USP'U]/ML
5000 INJECTION INTRAVENOUS; SUBCUTANEOUS EVERY 12 HOURS
Refills: 0 | Status: DISCONTINUED | OUTPATIENT
Start: 2021-09-18 | End: 2021-09-19

## 2021-09-18 RX ORDER — ONDANSETRON 8 MG/1
8 TABLET, FILM COATED ORAL EVERY 6 HOURS
Refills: 0 | Status: DISCONTINUED | OUTPATIENT
Start: 2021-09-18 | End: 2021-09-19

## 2021-09-18 RX ORDER — SUCRALFATE 1 G
1 TABLET ORAL
Refills: 0 | Status: DISCONTINUED | OUTPATIENT
Start: 2021-09-18 | End: 2021-09-19

## 2021-09-18 RX ORDER — POTASSIUM PHOSPHATE, MONOBASIC POTASSIUM PHOSPHATE, DIBASIC 236; 224 MG/ML; MG/ML
15 INJECTION, SOLUTION INTRAVENOUS ONCE
Refills: 0 | Status: COMPLETED | OUTPATIENT
Start: 2021-09-18 | End: 2021-09-18

## 2021-09-18 RX ORDER — MORPHINE SULFATE 50 MG/1
4 CAPSULE, EXTENDED RELEASE ORAL ONCE
Refills: 0 | Status: DISCONTINUED | OUTPATIENT
Start: 2021-09-18 | End: 2021-09-18

## 2021-09-18 RX ORDER — SIMETHICONE 80 MG/1
80 TABLET, CHEWABLE ORAL THREE TIMES A DAY
Refills: 0 | Status: DISCONTINUED | OUTPATIENT
Start: 2021-09-18 | End: 2021-09-19

## 2021-09-18 RX ORDER — PANTOPRAZOLE SODIUM 20 MG/1
40 TABLET, DELAYED RELEASE ORAL
Refills: 0 | Status: DISCONTINUED | OUTPATIENT
Start: 2021-09-18 | End: 2021-09-19

## 2021-09-18 RX ORDER — ONDANSETRON 8 MG/1
4 TABLET, FILM COATED ORAL ONCE
Refills: 0 | Status: COMPLETED | OUTPATIENT
Start: 2021-09-18 | End: 2021-09-18

## 2021-09-18 RX ORDER — THIAMINE MONONITRATE (VIT B1) 100 MG
100 TABLET ORAL ONCE
Refills: 0 | Status: COMPLETED | OUTPATIENT
Start: 2021-09-18 | End: 2021-09-18

## 2021-09-18 RX ORDER — ATORVASTATIN CALCIUM 80 MG/1
20 TABLET, FILM COATED ORAL AT BEDTIME
Refills: 0 | Status: DISCONTINUED | OUTPATIENT
Start: 2021-09-18 | End: 2021-09-18

## 2021-09-18 RX ORDER — MORPHINE SULFATE 50 MG/1
2 CAPSULE, EXTENDED RELEASE ORAL ONCE
Refills: 0 | Status: DISCONTINUED | OUTPATIENT
Start: 2021-09-18 | End: 2021-09-18

## 2021-09-18 RX ORDER — SCOPALAMINE 1 MG/3D
1 PATCH, EXTENDED RELEASE TRANSDERMAL ONCE
Refills: 0 | Status: COMPLETED | OUTPATIENT
Start: 2021-09-18 | End: 2021-09-18

## 2021-09-18 RX ORDER — FAMOTIDINE 10 MG/ML
20 INJECTION INTRAVENOUS ONCE
Refills: 0 | Status: COMPLETED | OUTPATIENT
Start: 2021-09-18 | End: 2021-09-18

## 2021-09-18 RX ORDER — THIAMINE MONONITRATE (VIT B1) 100 MG
100 TABLET ORAL DAILY
Refills: 0 | Status: DISCONTINUED | OUTPATIENT
Start: 2021-09-18 | End: 2021-09-19

## 2021-09-18 RX ORDER — INFLUENZA VIRUS VACCINE 15; 15; 15; 15 UG/.5ML; UG/.5ML; UG/.5ML; UG/.5ML
0.5 SUSPENSION INTRAMUSCULAR ONCE
Refills: 0 | Status: DISCONTINUED | OUTPATIENT
Start: 2021-09-18 | End: 2021-09-19

## 2021-09-18 RX ORDER — SODIUM CHLORIDE 9 MG/ML
1000 INJECTION INTRAMUSCULAR; INTRAVENOUS; SUBCUTANEOUS
Refills: 0 | Status: DISCONTINUED | OUTPATIENT
Start: 2021-09-18 | End: 2021-09-19

## 2021-09-18 RX ORDER — KETOROLAC TROMETHAMINE 30 MG/ML
15 SYRINGE (ML) INJECTION ONCE
Refills: 0 | Status: DISCONTINUED | OUTPATIENT
Start: 2021-09-18 | End: 2021-09-18

## 2021-09-18 RX ADMIN — PANTOPRAZOLE SODIUM 40 MILLIGRAM(S): 20 TABLET, DELAYED RELEASE ORAL at 12:14

## 2021-09-18 RX ADMIN — SODIUM CHLORIDE 1000 MILLILITER(S): 9 INJECTION INTRAMUSCULAR; INTRAVENOUS; SUBCUTANEOUS at 03:10

## 2021-09-18 RX ADMIN — SODIUM CHLORIDE 80 MILLILITER(S): 9 INJECTION INTRAMUSCULAR; INTRAVENOUS; SUBCUTANEOUS at 18:31

## 2021-09-18 RX ADMIN — MORPHINE SULFATE 4 MILLIGRAM(S): 50 CAPSULE, EXTENDED RELEASE ORAL at 05:39

## 2021-09-18 RX ADMIN — Medication 100 MILLIGRAM(S): at 05:41

## 2021-09-18 RX ADMIN — Medication 30 MILLILITER(S): at 03:26

## 2021-09-18 RX ADMIN — SODIUM CHLORIDE 1000 MILLILITER(S): 9 INJECTION INTRAMUSCULAR; INTRAVENOUS; SUBCUTANEOUS at 05:00

## 2021-09-18 RX ADMIN — Medication 1 GRAM(S): at 23:39

## 2021-09-18 RX ADMIN — Medication 100 MILLIGRAM(S): at 12:14

## 2021-09-18 RX ADMIN — MORPHINE SULFATE 4 MILLIGRAM(S): 50 CAPSULE, EXTENDED RELEASE ORAL at 07:17

## 2021-09-18 RX ADMIN — Medication 1 TABLET(S): at 05:45

## 2021-09-18 RX ADMIN — ONDANSETRON 4 MILLIGRAM(S): 8 TABLET, FILM COATED ORAL at 03:09

## 2021-09-18 RX ADMIN — Medication 1 TABLET(S): at 23:39

## 2021-09-18 RX ADMIN — Medication 1 MILLIGRAM(S): at 02:47

## 2021-09-18 RX ADMIN — Medication 4 MILLIGRAM(S): at 18:29

## 2021-09-18 RX ADMIN — HEPARIN SODIUM 5000 UNIT(S): 5000 INJECTION INTRAVENOUS; SUBCUTANEOUS at 18:29

## 2021-09-18 RX ADMIN — FAMOTIDINE 20 MILLIGRAM(S): 10 INJECTION INTRAVENOUS at 03:10

## 2021-09-18 RX ADMIN — MORPHINE SULFATE 2 MILLIGRAM(S): 50 CAPSULE, EXTENDED RELEASE ORAL at 23:38

## 2021-09-18 RX ADMIN — SODIUM CHLORIDE 1000 MILLILITER(S): 9 INJECTION INTRAMUSCULAR; INTRAVENOUS; SUBCUTANEOUS at 04:34

## 2021-09-18 RX ADMIN — Medication 4 MILLIGRAM(S): at 21:59

## 2021-09-18 RX ADMIN — ONDANSETRON 8 MILLIGRAM(S): 8 TABLET, FILM COATED ORAL at 23:38

## 2021-09-18 RX ADMIN — POTASSIUM PHOSPHATE, MONOBASIC POTASSIUM PHOSPHATE, DIBASIC 62.5 MILLIMOLE(S): 236; 224 INJECTION, SOLUTION INTRAVENOUS at 18:29

## 2021-09-18 RX ADMIN — SODIUM CHLORIDE 80 MILLILITER(S): 9 INJECTION INTRAMUSCULAR; INTRAVENOUS; SUBCUTANEOUS at 12:14

## 2021-09-18 RX ADMIN — Medication 30 MILLILITER(S): at 18:30

## 2021-09-18 RX ADMIN — SCOPALAMINE 1 PATCH: 1 PATCH, EXTENDED RELEASE TRANSDERMAL at 23:39

## 2021-09-18 RX ADMIN — Medication 4 MILLIGRAM(S): at 09:27

## 2021-09-18 RX ADMIN — Medication 15 MILLIGRAM(S): at 02:47

## 2021-09-18 NOTE — H&P ADULT - NSHPLABSRESULTS_GEN_ALL_CORE
LABS:                        12.0   2.02  )-----------( 280      ( 18 Sep 2021 03:16 )             38.7     09-18    144  |  105  |  13  ----------------------------<  83  4.1   |  18<L>  |  0.88    Ca    8.9      18 Sep 2021 03:16  Phos  3.3     09-18  Mg     2.2     09-18    TPro  8.1  /  Alb  4.1  /  TBili  0.3  /  DBili  .11  /  AST  78<H>  /  ALT  79<H>  /  AlkPhos  55  09-18      CARDIAC MARKERS ( 18 Sep 2021 03:16 )  <.015 ng/mL / x     / x     / x     / x            Lactate, Blood: 4.5 mmol/L (09-18 @ 07:15)  Lactate, Blood: 8.0 mmol/L (09-18 @ 04:09)      09-18 @ 03:32  -6.2  63

## 2021-09-18 NOTE — ED ADULT NURSE NOTE - OBJECTIVE STATEMENT
received a year 56 year old patient A&Ox3. c/o lower abd pain nausea, vomiting no signs of acute distress. non distended. abd is sofrt saftey matianed. patient placed on cardiac monitor and pulse ox

## 2021-09-18 NOTE — H&P ADULT - NSHPREVIEWOFSYSTEMS_GEN_ALL_CORE
REVIEW OF SYSTEMS:  GEN: no fever,    no chills  RESP: no SOB,   no cough  CVS: no chest pain,   no palpitations  GI:  s/ p  abdominal pain,   no nausea,   no vomiting,   no constipation,   no diarrhea  : no dysuria,   no frequency  NEURO: no headache,   no dizziness  PSYCH: no depression,   not anxious  Derm : no rash

## 2021-09-18 NOTE — ED PROVIDER NOTE - OBJECTIVE STATEMENT
55 yo M hx ETOH use disorder presenting with complaints of 8 days of abdominal pain with nausea/vomiting. States last drink was 3 days ago. Denies fevers/chills.

## 2021-09-18 NOTE — ED ADULT NURSE NOTE - ED STAT RN HANDOFF DETAILS
Report received from RN at this time. Assessment available on [facility]. will continue to monitor

## 2021-09-18 NOTE — H&P ADULT - NSHPPHYSICALEXAM_GEN_ALL_CORE
PHYSICAL EXAMINATION:  Vital Signs Last 24 Hrs  T(C): 36.7 (18 Sep 2021 06:27), Max: 36.9 (18 Sep 2021 00:38)  T(F): 98 (18 Sep 2021 06:27), Max: 98.5 (18 Sep 2021 02:33)  HR: 89 (18 Sep 2021 06:27) (78 - 129)  BP: 146/96 (18 Sep 2021 06:27) (146/96 - 167/97)  BP(mean): --  RR: 15 (18 Sep 2021 06:27) (15 - 18)  SpO2: 95% (18 Sep 2021 06:27) (95% - 98%)  CAPILLARY BLOOD GLUCOSE            GENERAL: NAD, well-groomed,  HEAD:  atraumatic, normocephalic  EYES: sclera anicteric  ENMT: mucous membranes moist  NECK: supple, No JVD  CHEST/LUNG: clear to auscultation bilaterally;    no      rales   ,   no rhonchi,   HEART: normal S1, S2  ABDOMEN: BS+, soft, ND, NT   EXTREMITIES:    no    edema    b/l LEs  NEURO: awake, ,     moves all extremities  SKIN: no     rash PHYSICAL EXAMINATION:  Vital Signs Last 24 Hrs  T(C): 36.7 (18 Sep 2021 06:27), Max: 36.9 (18 Sep 2021 00:38)  T(F): 98 (18 Sep 2021 06:27), Max: 98.5 (18 Sep 2021 02:33)  HR: 89 (18 Sep 2021 06:27) (78 - 129)  BP: 146/96 (18 Sep 2021 06:27) (146/96 - 167/97)  BP(mean): --  RR: 15 (18 Sep 2021 06:27) (15 - 18)  SpO2: 95% (18 Sep 2021 06:27) (95% - 98%)  CAPILLARY BLOOD GLUCOSE          pt apperas  comfortbale  GENERAL: NAD, well-groomed,  HEAD:  atraumatic, normocephalic  EYES: sclera anicteric  ENMT: mucous membranes moist  NECK: supple, No JVD  CHEST/LUNG: clear to auscultation bilaterally;    no      rales   ,   no rhonchi,   HEART: normal S1, S2  ABDOMEN: BS+, soft, ND, NT   EXTREMITIES:    no    edema    b/l LEs  NEURO: awake, ,     moves all extremities  SKIN: no     rash

## 2021-09-18 NOTE — ED PROVIDER NOTE - CLINICAL SUMMARY MEDICAL DECISION MAKING FREE TEXT BOX
55 yo M presenting with n/v abdominal pain. labs, fluids, analgesia, ativan for withdrawal sx, dispo per findings

## 2021-09-18 NOTE — PATIENT PROFILE ADULT - BILL PAYMENT
21        Georgia Bejarano   [facility] [id number]          Patient:  Georgia Bejarano : 1976  MRN#: [id number]      Dear Georgia:    We have been unable to reach you by phone.  Please contact the office at [phone] at your earliest convenience to discuss.      Sincerely,          Vannessa Harrell PA-C  [address] [address] [phone]  
no

## 2021-09-18 NOTE — ED PROVIDER NOTE - PHYSICAL EXAMINATION
VITALS: reviewed  GEN: distress 2/2 pain, A & O x 4  HEAD/EYES: NCAT, PERRL, EOMI, anicteric sclerae, no conjunctival pallor  ENT: mucus membranes moist, oropharynx WNL, trachea midline, no JVD  RESP: lungs CTA with equal breath sounds bilaterally, chest wall nontender and atraumatic  CV: heart with reg rhythm S1, S2, distal pulses intact and symmetric bilaterally  ABDOMEN: soft, nondistended, nontender, no palpable masses  : no CVAT  MSK: extremities atraumatic and nontender, no edema, no asymmetry.  SKIN: warm, dry, no rash, no bruising, no cyanosis. color appropriate for ethnicity  NEURO: alert, mentating appropriately, no facial asymmetry. tongue fasciculations, hand tremors  PSYCH: Affect appropriate

## 2021-09-18 NOTE — ED ADULT NURSE NOTE - NSIMPLEMENTINTERV_GEN_ALL_ED
Implemented All Fall Risk Interventions:  [place] to call system. Call bell, personal items and telephone within reach. Instruct patient to call for assistance. Room bathroom lighting operational. Non-slip footwear when patient is off stretcher. Physically safe environment: no spills, clutter or unnecessary equipment. Stretcher in lowest position, wheels locked, appropriate side rails in place. Provide visual cue, wrist band, yellow gown, etc. Monitor gait and stability. Monitor for mental status changes and reorient to person, place, and time. Review medications for side effects contributing to fall risk. Reinforce activity limits and safety measures with patient and family.

## 2021-09-18 NOTE — H&P ADULT - ASSESSMENT
55 yo M       h/o  chronic ETOH abuse ,  with   h/o / DTs with frequent hospital admissions, HLD, alcoholic gastritis.    h/o poor peripheral access       & hx of hypoxic respiratory failure requiring intubation due to aspiration PNA,. on 3/21        h/o   c/c  tachycardia      h/o mlple icu visits,  needing   Precedex gtt and phenobarb on 3/5./21. 4/25/21  and 4/29/ /21      pt  is  unemployed ,  and,  states he  drinks  vodka  and  whiskey                admitted with   etoh intoxication/  and  impending DT's,  aith abd  pain            last drink was  2  days ago per  pt       *    Lactic acidosis due to dehydration  and  etoh  toxicity           lactate  was  8,  now  is  4,  on iv  fluids                 pt has ,  AALA.  alcohol associated   lactic acidosis                  pt  states,   he  has pain  all over  his  body,, which can occur  with high lactate                 on    CIWA   protocol                  on iv  fluids.  thiamine,  protonix.               CT  A/P, with esophagitis, hepatic  steatosis      *    elevated AST / ALT, from etoh hepatitis   *       c/c  leukopenia, suspect  from etoh  induced  bone  marrow suppression     *      h/o   c/c  Tachycardia.  hr  is  89 now               from etoh abuse/  withdrawal symptoms     *     HTN/  HLD,              pt  is non compliant  with his meds,             on dvt ppx         Given  h/o mlple icu  admissions,  low threshold to call icu, if  pt  deteriorates    per  family, , pt  drinks whiskey on a regular basis,  about half  to a  full  bottle,  for past 25 years  [place], [phone]      ra< from: CT Abdomen and Pelvis w/ IV Cont (06.04.21 @ 13:11) >  IMPRESSION:  No CT evidence of acute pancreatitis.  Hepatic steatosis.  Distal esophageal wall thickening, question esophagiti  < end of copied text >     57 yo M       h/o  chronic ETOH abuse ,  with   h/o / DTs with frequent hospital admissions, HLD, alcoholic gastritis.    h/o poor peripheral access       & hx of hypoxic respiratory failure requiring intubation due to aspiration PNA,. on 3/21        h/o   c/c  tachycardia      h/o mlple icu visits,  needing   Precedex gtt and phenobarb on 3/5./21. 4/25/21  and 4/29/ /21      pt  is  unemployed ,  and,  states he  drinks  vodka  and  whiskey                admitted with   etoh intoxication/  and  impending DT's,    had  abd  pain            last drink was  2  days ago per  pt       *    Lactic acidosis due to dehydration  and  etoh  toxicity           lactate  was  8,  now  is  4,  on iv  fluids                 pt has ,  AALA.  alcohol associated   lactic acidosis                  pt  states,   he  has pain  all over  his  body,, which can occur  with high lactate                 on    CIWA   protocol                  on iv  fluids.  thiamine,  protonix.               CT  A/P  on 6/21 , with esophagitis, hepatic  steatosis                CT  A/P  9/18/21, gastritis/  was  seen by  gi  dr dumont in past    *    elevated AST / ALT, from etoh hepatitis   *      c/c  leukopenia, suspect  from etoh  induced  bone  marrow suppression    *      h/o   c/c  Tachycardia.  hr  is  89 now               from etoh abuse/  withdrawal symptoms    *     HTN/  HLD, ,  pt  is non compliant  with his meds,    on dvt ppx      ra< from: CT Angio Abdomen and Pelvis w/ IV Cont (09.18.21 @ 06:01) >  MPRESSION:  Redemonstration of gastric wall thickening, concerning for gastritis.  Small area of ill-defined high attenuation along the greater curvature, uncertain whether this represents hemorrhage or other ingested material. Correlate with clinical examination, laboratory findings, and endoscopic evaluation.  < end of copied text >     Given  h/o mlple icu  admissions,  low threshold to call icu, if  pt  deteriorates    per  family, , pt  drinks whiskey on a regular basis,  about half  to a  full  bottle,  for past 25 years  [place], [phone]      ra< from: CT Abdomen and Pelvis w/ IV Cont (06.04.21 @ 13:11) >  IMPRESSION:  No CT evidence of acute pancreatitis.  Hepatic steatosis.  Distal esophageal wall thickening, question esophagiti  < end of copied text >     57 yo M       h/o  chronic ETOH abuse ,  with   h/o / DTs with frequent hospital admissions, HLD, alcoholic gastritis.        h/o poor peripheral access        &  prior  hx of hypoxic respiratory failure requiring intubation due to aspiration PNA,. on 3/21        h/o   c/c  tachycardia       h/o mlple icu visits,  needing   Precedex gtt and phenobarb on 3/5./21. 4/25/21  and 4/29/ /21        pt  is  unemployed ,  and,  states he  drinks  vodka  and  whiskey / [place]  [employer], per pt               admitted with        *    ETOH  intoxication/  and  impending DT's,               had  abd  pain,  which has   since resolved            last drink was  2  days ago per  pt/ drinks   vodka  and  [place]  [employer]       *   Lactic acidosis due to dehydration  and  etoh  toxicity            lactate  was  8,  now  is  4,  on iv  fluids              pt has ,  AALA.  alcohol associated   lactic acidosis             on    CIWA   protocol              on iv  fluids.  thiamine,  protonix.             CT  A/P  on 6/21 , with esophagitis, hepatic  steatosis              CT  A/P  9/18/21, gastritis/  was  seen by  gi  dr dumont in past    *    elevated AST / ALT, from etoh hepatitis   *      c/c  leukopenia, suspect  from etoh  induced  bone  marrow suppression    *       prior  h/o   c/c   tachycardia ,  from etoh abuse/  withdrawal symptoms          hr is 89 now    *     HLD, ,  pt  is non compliant  with his meds,    on dvt ppx      ra< from: CT Angio Abdomen and Pelvis w/ IV Cont (09.18.21 @ 06:01) >  MPRESSION:  Redemonstration of gastric wall thickening, concerning for gastritis.  Small area of ill-defined high attenuation along the greater curvature, uncertain whether this represents hemorrhage or other ingested material. Correlate with clinical examination, laboratory findings, and endoscopic evaluation.  < end of copied text >     Given  h/o mlple icu  admissions,  low threshold to call icu, if  pt  deteriorates    per  family, , pt  drinks whiskey on a regular basis,  about half  to a  full  bottle,  for past 25 years  [place], [phone]      ra< from: CT Abdomen and Pelvis w/ IV Cont (06.04.21 @ 13:11) >  IMPRESSION:  No CT evidence of acute pancreatitis.  Hepatic steatosis.  Distal esophageal wall thickening, question esophagiti  < end of copied text >

## 2021-09-18 NOTE — PATIENT PROFILE ADULT - MONEY FOR FOOD
Initial Anesthesia Post-op Note    Patient: Prerna Austin  Procedure(s) Performed: EXCISION PALATE MASS, ALLODERM GRAFT   PALLETAL RECONSTRUCTION  Anesthesia type: General    Vital Last Value   Temperature 36.5 °C (97.7 °F) (03/16/18 1349)   Pulse 71 (03/16/18 1349)   Respiratory Rate 16 (03/16/18 1349)   Non-Invasive   Blood Pressure 151/65 (03/16/18 1349)   Arterial  Blood Pressure     Pulse Oximetry 100 % (03/16/18 1349)     Last 24 I/O:   Intake/Output Summary (Last 24 hours) at 03/16/18 1350  Last data filed at 03/16/18 1338   Gross per 24 hour   Intake             2300 ml   Output              200 ml   Net             2100 ml       PATIENT LOCATION: PACU Phase 1  POST-OP VITAL SIGNS: stable  LEVEL OF CONSCIOUSNESS: participates in exam, awake, oriented, answers questions appropriately and alert  RESPIRATORY STATUS: spontaneous ventilation, face mask and unassisted  CARDIOVASCULAR: stable and blood pressure returned to baseline  HYDRATION: euvolemic    PAIN MANAGEMENT: adequately controlled  NAUSEA: None  AIRWAY PATENCY: patent  POST-OP ASSESSMENT: no complications, patient tolerated procedure well with no complications and sufficiently recovered from acute administration of anesthesia effects and able to participate in evaluation  COMPLICATIONS: none  HANDOFF:  Handoff to receiving nurse was performed and questions were answered      
no

## 2021-09-18 NOTE — H&P ADULT - HISTORY OF PRESENT ILLNESS
: 57 yo M        hx ETOH use disorder     pt  is  a daily drinker         presenting with complaints of 8 days of abdominal pain with nausea/vomiting.     denies  cp/sob/ fevers  rectal  bleed   has  prior  h/o abd  pain  after etoh binging   currently ha s improved     elevated lactate  on  arrival   last     : 55 yo M        hx ETOH use disorder     pt  is  a daily drinker   ha s had  mlple  admissions to  this hosp          presenting with complaints of 8 days of abdominal pain with nausea/vomiting.     denies  cp/sob/ fevers  rectal  bleed     has  prior  h/o abd  pain  after etoh binging,  and  ha s been  seen by  gi  dr charles in the past    currently has  improved      elevated lactate  on  arrival    last

## 2021-09-19 ENCOUNTER — TRANSCRIPTION ENCOUNTER (OUTPATIENT)
Age: 56
End: 2021-09-19

## 2021-09-19 VITALS
TEMPERATURE: 98 F | RESPIRATION RATE: 17 BRPM | HEART RATE: 89 BPM | OXYGEN SATURATION: 98 % | SYSTOLIC BLOOD PRESSURE: 113 MMHG | DIASTOLIC BLOOD PRESSURE: 68 MMHG

## 2021-09-19 LAB
ANION GAP SERPL CALC-SCNC: 8 MMOL/L — SIGNIFICANT CHANGE UP (ref 5–17)
BUN SERPL-MCNC: 9 MG/DL — SIGNIFICANT CHANGE UP (ref 7–23)
CALCIUM SERPL-MCNC: 8.7 MG/DL — SIGNIFICANT CHANGE UP (ref 8.5–10.1)
CHLORIDE SERPL-SCNC: 108 MMOL/L — SIGNIFICANT CHANGE UP (ref 96–108)
CO2 SERPL-SCNC: 26 MMOL/L — SIGNIFICANT CHANGE UP (ref 22–31)
COVID-19 SPIKE DOMAIN AB INTERP: POSITIVE
COVID-19 SPIKE DOMAIN ANTIBODY RESULT: >250 U/ML — HIGH
CREAT SERPL-MCNC: 0.87 MG/DL — SIGNIFICANT CHANGE UP (ref 0.5–1.3)
GLUCOSE SERPL-MCNC: 73 MG/DL — SIGNIFICANT CHANGE UP (ref 70–99)
MAGNESIUM SERPL-MCNC: 2 MG/DL — SIGNIFICANT CHANGE UP (ref 1.6–2.6)
PHOSPHATE SERPL-MCNC: 2 MG/DL — LOW (ref 2.5–4.5)
POTASSIUM SERPL-MCNC: 4 MMOL/L — SIGNIFICANT CHANGE UP (ref 3.5–5.3)
POTASSIUM SERPL-SCNC: 4 MMOL/L — SIGNIFICANT CHANGE UP (ref 3.5–5.3)
SARS-COV-2 IGG+IGM SERPL QL IA: >250 U/ML — HIGH
SARS-COV-2 IGG+IGM SERPL QL IA: POSITIVE
SODIUM SERPL-SCNC: 142 MMOL/L — SIGNIFICANT CHANGE UP (ref 135–145)

## 2021-09-19 PROCEDURE — 99238 HOSP IP/OBS DSCHRG MGMT 30/<: CPT

## 2021-09-19 RX ADMIN — Medication 1 GRAM(S): at 12:55

## 2021-09-19 RX ADMIN — Medication 1 GRAM(S): at 05:58

## 2021-09-19 RX ADMIN — Medication 2 MILLIGRAM(S): at 13:26

## 2021-09-19 RX ADMIN — HEPARIN SODIUM 5000 UNIT(S): 5000 INJECTION INTRAVENOUS; SUBCUTANEOUS at 05:58

## 2021-09-19 RX ADMIN — PANTOPRAZOLE SODIUM 40 MILLIGRAM(S): 20 TABLET, DELAYED RELEASE ORAL at 05:58

## 2021-09-19 RX ADMIN — Medication 3 MILLIGRAM(S): at 10:32

## 2021-09-19 RX ADMIN — Medication 4 MILLIGRAM(S): at 02:41

## 2021-09-19 RX ADMIN — MORPHINE SULFATE 2 MILLIGRAM(S): 50 CAPSULE, EXTENDED RELEASE ORAL at 00:22

## 2021-09-19 RX ADMIN — Medication 4 MILLIGRAM(S): at 05:56

## 2021-09-19 NOTE — DISCHARGE NOTE NURSING/CASE MANAGEMENT/SOCIAL WORK - PATIENT PORTAL LINK FT
You can access the FollowMyHealth Patient Portal offered by [facility] by registering at the following website: [email or web address]. By joining [employer]’s FollowMyHealth portal, you will also be able to view your health information using other applications (apps) compatible with our system.

## 2021-09-19 NOTE — DISCHARGE NOTE PROVIDER - NSDCCPCAREPLAN_GEN_ALL_CORE_FT
PRINCIPAL DISCHARGE DIAGNOSIS  Diagnosis: Abdominal pain  Assessment and Plan of Treatment: resolved likely secondary to ETOH

## 2021-09-19 NOTE — DISCHARGE NOTE PROVIDER - HOSPITAL COURSE
HPI:  : 55 yo M        hx ETOH use disorder     pt  is  a daily drinker   ha s had  mlple  admissions to  this hosp          presenting with complaints of 8 days of abdominal pain with nausea/vomiting.     denies  cp/sob/ fevers  rectal  bleed     has  prior  h/o abd  pain  after etoh binging,  and  ha s been  seen by  gi  dr charles in the past    currently has  improved      elevated lactate  on  arrival        resolved patient requested Discharge

## 2021-09-19 NOTE — DISCHARGE NOTE NURSING/CASE MANAGEMENT/SOCIAL WORK - NSDCVIVACCINE_GEN_ALL_CORE_FT
COVID-19, mRNA, LNP-S, PF, 100 mcg/ 0.5 mL dose (Moderna); 10-Jovan-2021 15:38; Reji Hernandez (RN); [employer] [id number] (Exp. Date: 13-Jul-2021); IntraMuscular; Deltoid Right.; 0.5 milliLiter(s);   influenza, injectable, quadrivalent, preservative free; 31-Jan-2019 15:53; Ayaka Bynum (RN); [employer]; [id number] (Exp. Date: 30-Jun-2019); IntraMuscular; Deltoid Right.; 0.5 milliLiter(s); VIS (VIS Published: 07-Aug-2015, VIS Presented: 31-Jan-2019);   influenza, injectable, quadrivalent, preservative free; 10-Nov-2019 14:13; Laverne Lane (RN); [employer]; [id number] (Exp. Date: 30-Jun-2020); IntraMuscular; Deltoid Left.; 0.5 milliLiter(s); VIS (VIS Published: 15-Aug-2019, VIS Presented: 10-Nov-2019);   influenza, injectable, quadrivalent, preservative free; 13-Oct-2020 11:56; Kimberli Cronin (RN); [employer]; [id number] (Exp. Date: 30-Jun-2021); IntraMuscular; Deltoid Right.; 0.5 milliLiter(s); VIS (VIS Published: 15-Aug-2019, VIS Presented: 13-Oct-2020);   Td (adult) preservative free; 18-Jan-2020 20:04; Yulia Vance (RN); [employer]; [id number] (Exp. Date: 19-Jan-2021); IntraMuscular; Deltoid Right.; 0.5 milliLiter(s); VIS (VIS Published: 18-Jan-2020, VIS Presented: 18-Jan-2020);

## 2021-09-20 ENCOUNTER — EMERGENCY (EMERGENCY)
Facility: HOSPITAL | Age: 56
LOS: 0 days | Discharge: HOME | End: 2021-09-21
Attending: STUDENT IN AN ORGANIZED HEALTH CARE EDUCATION/TRAINING PROGRAM
Payer: MEDICAID

## 2021-09-20 ENCOUNTER — EMERGENCY (EMERGENCY)
Facility: HOSPITAL | Age: 56
LOS: 0 days | Discharge: HOME | End: 2021-09-20
Attending: EMERGENCY MEDICINE
Payer: MEDICAID

## 2021-09-20 VITALS
SYSTOLIC BLOOD PRESSURE: 137 MMHG | DIASTOLIC BLOOD PRESSURE: 92 MMHG | HEART RATE: 90 BPM | HEIGHT: 67 IN | RESPIRATION RATE: 18 BRPM | WEIGHT: 184.97 LBS | OXYGEN SATURATION: 98 % | TEMPERATURE: 98 F

## 2021-09-20 VITALS
OXYGEN SATURATION: 100 % | HEART RATE: 100 BPM | DIASTOLIC BLOOD PRESSURE: 90 MMHG | HEIGHT: 67 IN | WEIGHT: 186.07 LBS | TEMPERATURE: 98 F | RESPIRATION RATE: 18 BRPM | SYSTOLIC BLOOD PRESSURE: 127 MMHG
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
## 2021-09-20 DIAGNOSIS — E78.5 HYPERLIPIDEMIA, UNSPECIFIED: ICD-10-CM

## 2021-09-20 DIAGNOSIS — F10.231 ALCOHOL DEPENDENCE WITH WITHDRAWAL DELIRIUM: ICD-10-CM

## 2021-09-20 DIAGNOSIS — E78.2 MIXED HYPERLIPIDEMIA: ICD-10-CM

## 2021-09-20 DIAGNOSIS — F10.129 ALCOHOL ABUSE WITH INTOXICATION, UNSPECIFIED: ICD-10-CM

## 2021-09-20 DIAGNOSIS — Z79.82 LONG TERM (CURRENT) USE OF ASPIRIN: ICD-10-CM
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
## 2021-09-20 DIAGNOSIS — Z72.0 TOBACCO USE: ICD-10-CM

## 2021-09-20 DIAGNOSIS — Z87.11 PERSONAL HISTORY OF PEPTIC ULCER DISEASE: ICD-10-CM
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
## 2021-09-20 DIAGNOSIS — Z20.822 CONTACT WITH AND (SUSPECTED) EXPOSURE TO COVID-19: ICD-10-CM

## 2021-09-20 DIAGNOSIS — K27.9 PEPTIC ULCER, SITE UNSPECIFIED, UNSPECIFIED AS ACUTE OR CHRONIC, WITHOUT HEMORRHAGE OR PERFORATION: ICD-10-CM

## 2021-09-20 DIAGNOSIS — F10.10 ALCOHOL ABUSE, UNCOMPLICATED: ICD-10-CM

## 2021-09-20 DIAGNOSIS — K29.70 GASTRITIS, UNSPECIFIED, WITHOUT BLEEDING: ICD-10-CM

## 2021-09-20 LAB
ALBUMIN SERPL ELPH-MCNC: 3.8 G/DL — SIGNIFICANT CHANGE UP (ref 3.3–5)
ALP SERPL-CCNC: 49 U/L — SIGNIFICANT CHANGE UP (ref 40–120)
ALT FLD-CCNC: 62 U/L — SIGNIFICANT CHANGE UP (ref 12–78)
AMYLASE P1 CFR SERPL: 62 U/L — SIGNIFICANT CHANGE UP (ref 25–115)
ANION GAP SERPL CALC-SCNC: 7 MMOL/L — SIGNIFICANT CHANGE UP (ref 5–17)
AST SERPL-CCNC: 84 U/L — HIGH (ref 15–37)
BASOPHILS # BLD AUTO: 0.02 K/UL — SIGNIFICANT CHANGE UP (ref 0–0.2)
BASOPHILS NFR BLD AUTO: 0.6 % — SIGNIFICANT CHANGE UP (ref 0–2)
BILIRUB DIRECT SERPL-MCNC: 0.16 MG/DL — SIGNIFICANT CHANGE UP (ref 0.05–0.2)
BILIRUB INDIRECT FLD-MCNC: 0.7 MG/DL — SIGNIFICANT CHANGE UP (ref 0.2–1)
BILIRUB SERPL-MCNC: 0.9 MG/DL — SIGNIFICANT CHANGE UP (ref 0.2–1.2)
BUN SERPL-MCNC: 7 MG/DL — SIGNIFICANT CHANGE UP (ref 7–23)
CALCIUM SERPL-MCNC: 10 MG/DL — SIGNIFICANT CHANGE UP (ref 8.5–10.1)
CHLORIDE SERPL-SCNC: 108 MMOL/L — SIGNIFICANT CHANGE UP (ref 96–108)
CO2 SERPL-SCNC: 25 MMOL/L — SIGNIFICANT CHANGE UP (ref 22–31)
CREAT SERPL-MCNC: 0.91 MG/DL — SIGNIFICANT CHANGE UP (ref 0.5–1.3)
EOSINOPHIL # BLD AUTO: 0.18 K/UL — SIGNIFICANT CHANGE UP (ref 0–0.5)
EOSINOPHIL NFR BLD AUTO: 5.7 % — SIGNIFICANT CHANGE UP (ref 0–6)
ETHANOL SERPL-MCNC: <10 MG/DL — SIGNIFICANT CHANGE UP (ref 0–10)
FLUAV AG NPH QL: SIGNIFICANT CHANGE UP
FLUBV AG NPH QL: SIGNIFICANT CHANGE UP
GLUCOSE BLDC GLUCOMTR-MCNC: 101 MG/DL — HIGH (ref 70–99)
GLUCOSE SERPL-MCNC: 82 MG/DL — SIGNIFICANT CHANGE UP (ref 70–99)
HCT VFR BLD CALC: 33.3 % — LOW (ref 39–50)
HGB BLD-MCNC: 10.2 G/DL — LOW (ref 13–17)
IMM GRANULOCYTES NFR BLD AUTO: 0.3 % — SIGNIFICANT CHANGE UP (ref 0–1.5)
LIDOCAIN IGE QN: 183 U/L — SIGNIFICANT CHANGE UP (ref 73–393)
LYMPHOCYTES # BLD AUTO: 1.5 K/UL — SIGNIFICANT CHANGE UP (ref 1–3.3)
LYMPHOCYTES # BLD AUTO: 47.3 % — HIGH (ref 13–44)
MAGNESIUM SERPL-MCNC: 2.1 MG/DL — SIGNIFICANT CHANGE UP (ref 1.6–2.6)
MCHC RBC-ENTMCNC: 23.8 PG — LOW (ref 27–34)
MCHC RBC-ENTMCNC: 30.6 GM/DL — LOW (ref 32–36)
MCV RBC AUTO: 77.8 FL — LOW (ref 80–100)
MONOCYTES # BLD AUTO: 0.44 K/UL — SIGNIFICANT CHANGE UP (ref 0–0.9)
MONOCYTES NFR BLD AUTO: 13.9 % — SIGNIFICANT CHANGE UP (ref 2–14)
NEUTROPHILS # BLD AUTO: 1.02 K/UL — LOW (ref 1.8–7.4)
NEUTROPHILS NFR BLD AUTO: 32.2 % — LOW (ref 43–77)
NRBC # BLD: 0 /100 WBCS — SIGNIFICANT CHANGE UP (ref 0–0)
PHOSPHATE SERPL-MCNC: 2.9 MG/DL — SIGNIFICANT CHANGE UP (ref 2.5–4.5)
PLATELET # BLD AUTO: 169 K/UL — SIGNIFICANT CHANGE UP (ref 150–400)
POTASSIUM SERPL-MCNC: 4 MMOL/L — SIGNIFICANT CHANGE UP (ref 3.5–5.3)
POTASSIUM SERPL-SCNC: 4 MMOL/L — SIGNIFICANT CHANGE UP (ref 3.5–5.3)
PROT SERPL-MCNC: 7.4 GM/DL — SIGNIFICANT CHANGE UP (ref 6–8.3)
RBC # BLD: 4.28 M/UL — SIGNIFICANT CHANGE UP (ref 4.2–5.8)
RBC # FLD: 21.7 % — HIGH (ref 10.3–14.5)
SARS-COV-2 RNA SPEC QL NAA+PROBE: SIGNIFICANT CHANGE UP
SODIUM SERPL-SCNC: 140 MMOL/L — SIGNIFICANT CHANGE UP (ref 135–145)
WBC # BLD: 3.17 K/UL — LOW (ref 3.8–10.5)
WBC # FLD AUTO: 3.17 K/UL — LOW (ref 3.8–10.5)

## 2021-09-20 PROCEDURE — 71045 X-RAY EXAM CHEST 1 VIEW: CPT | Mod: 26

## 2021-09-20 PROCEDURE — 99283 EMERGENCY DEPT VISIT LOW MDM: CPT

## 2021-09-20 PROCEDURE — 72125 CT NECK SPINE W/O DYE: CPT | Mod: 26,MA

## 2021-09-20 PROCEDURE — 70450 CT HEAD/BRAIN W/O DYE: CPT | Mod: 26,MA

## 2021-09-20 PROCEDURE — 99285 EMERGENCY DEPT VISIT HI MDM: CPT

## 2021-09-20 RX ORDER — SODIUM CHLORIDE 9 MG/ML
1000 INJECTION INTRAMUSCULAR; INTRAVENOUS; SUBCUTANEOUS ONCE
Refills: 0 | Status: COMPLETED | OUTPATIENT
Start: 2021-09-20 | End: 2021-09-20

## 2021-09-20 NOTE — ED ADULT TRIAGE NOTE - CHIEF COMPLAINT QUOTE
biba from home per son pt acting erractically hx etoh abuse last drank few days ago a&ox3 denies pain no tremors noted at present denies nausea

## 2021-09-20 NOTE — ED PROVIDER NOTE - CLINICAL SUMMARY MEDICAL DECISION MAKING FREE TEXT BOX
hx, exam, Pt is very cooperative pleasant no suicial /homocidal ideation ciwa is one. Pt is given the list of out pt alcohol treatment

## 2021-09-20 NOTE — ED ADULT TRIAGE NOTE - RESPIRATORY RATE (BREATHS/MIN)
18 Airway patent, Nasal mucosa clear. Mouth with normal mucosa. Throat has no vesicles, no oropharyngeal exudates and uvula is midline.

## 2021-09-20 NOTE — ED PROVIDER NOTE - PATIENT PORTAL LINK FT
You can access the FollowMyHealth Patient Portal offered by [facility] by registering at the following website: [email or web address]. By joining [employer]’s FollowMyHealth portal, you will also be able to view your health information using other applications (apps) compatible with our system.

## 2021-09-20 NOTE — ED ADULT TRIAGE NOTE - CHIEF COMPLAINT QUOTE
vesna smith s/p d/c'd from here earlier this evening per ems/soha pd pt had 1/2 pint of vodka with him was wandering around people's yards

## 2021-09-20 NOTE — ED ADULT NURSE NOTE - COVID-19 ORDERING FACILITY
Pt. returned call stating he is not going to continue care with Dr. Yepez, but will continue care with RT MD.  Pt. states awareness that RT Onc MD does not cover med onc MD in all areas.     [facility]

## 2021-09-20 NOTE — ED ADULT NURSE NOTE - OBJECTIVE STATEMENT
56 year old male with hx hld recently discharged 56 year old male with hx hld recently discharged, curently unable to answer screening questions

## 2021-09-20 NOTE — ED PROVIDER NOTE - PROGRESS NOTE DETAILS
sleeping pt is awake, able to ambulate with steady gait, able to ambulate to the bathroom, pt's son called for , message left

## 2021-09-20 NOTE — ED ADULT NURSE NOTE - NSIMPLEMENTINTERV_GEN_ALL_ED
Implemented All Universal Safety Interventions:  [place] to call system. Call bell, personal items and telephone within reach. Instruct patient to call for assistance. Room bathroom lighting operational. Non-slip footwear when patient is off stretcher. Physically safe environment: no spills, clutter or unnecessary equipment. Stretcher in lowest position, wheels locked, appropriate side rails in place.

## 2021-09-20 NOTE — ED PROVIDER NOTE - CONSTITUTIONAL, MLM
normal... Pt alert and awake, however confused as per son pt speaking his language and not making sense, states he has to go to court which son states is not true. No visible signs of trauma. Intoxicated.

## 2021-09-20 NOTE — ED PROVIDER NOTE - PATIENT PORTAL LINK FT
You can access the FollowMyHealth Patient Portal offered by [facility] by registering at the following website: [email or web address]. By joining [employer]s FollowMyHealth portal, you will also be able to view your health information using other applications (apps) compatible with our system.

## 2021-09-20 NOTE — ED PROVIDER NOTE - CONSTITUTIONAL, MLM
normal... Well appearing, awake, alert, oriented to person, place, time/situation and in no apparent distress. Speaking in clear full sentences no nasal flaring no shoulders retractions no diaphoresis smiling cooperative pleasant appears very comfortable

## 2021-09-20 NOTE — ED ADULT NURSE NOTE - OBJECTIVE STATEMENT
biba from home per son pt acting erratically hx etoh abuse last drank few days ago a&ox3 denies pain no tremors noted at present denies nausea. pt a&O x3 ambulatory request ti call son, upset and anxious holding DMV papers stating I have to get my car. pt denies pain. states last drink x 3 days ago. biba from home per son pt acting erratically hx etoh abuse last drank few days ago a&ox3 denies pain no tremors noted at present denies nausea. pt a&O x3 ambulatory request ti call son, upset and anxious holding DMV papers stating I have to get my car. pt denies pain. states last drink x 3 days ago      . no headache no tremors no noted, no symptoms of alcohol withdrawl, no hallucinations.

## 2021-09-20 NOTE — ED ADULT NURSE NOTE - PAIN RATING/NUMBER SCALE (0-10): ACTIVITY
0 H Plasty Text: Given the location of the defect, shape of the defect and the proximity to free margins a H-plasty was deemed most appropriate for repair.  Using a sterile surgical marker, the appropriate advancement arms of the H-plasty were drawn incorporating the defect and placing the expected incisions within the relaxed skin tension lines where possible. The area thus outlined was incised deep to adipose tissue with a #15 scalpel blade. The skin margins were undermined to an appropriate distance in all directions utilizing iris scissors.  The opposing advancement arms were then advanced into place in opposite direction and anchored with interrupted buried subcutaneous sutures.

## 2021-09-20 NOTE — ED ADULT NURSE NOTE - CAS ELECT INFOMATION PROVIDED
pt visualized ambualting , fully a7O x4, son updated at home. pt deneis alcohol use for 3 days/DC instructions

## 2021-09-20 NOTE — ED PROVIDER NOTE - OBJECTIVE STATEMENT
56 years old male brought in by police because pt ran out from the hospital this afternoon while he alcohol withdraw and had iv lock on him. Pt's son [phone] was called and notified. Pt here is alert and oriented x 3 has no iv lock noted now CIWA score is one. Pt denies headache, dizziness, blurred visions, light sensitivities, focal/distal weakness or numbness, neck/back pain, cough, sob, chest pain, nausea, vomiting, fever, chills, abd pain. Pt also denies auditory and visual hallucinations or harmful thoughts to himself or others. Pt sts he was admitted here for abd pain but he fell better so he felt the hospital.

## 2021-09-20 NOTE — ED PROVIDER NOTE - OBJECTIVE STATEMENT
Pt is a 56 year old male w/PMH of DTs, EtOH abuse, PUD, presents to the ED BIBEMS for alcohol intoxication. Per EMS pt had 1/2 pint of vodka with him and was wandering around people's yards. Son who lives with pt states he has never trespassed prior and that pt is acting more confused than usual. Pt was just discharged from this hospital earlier today. Son reports pt will drink x3-4 flasks of vodka a day. Son reports pt with fall in the bathroom and took down a shelf yesterday night, unknown head-strike or LOC. +Chewing tobacco, denies other illicit substance use. Pt is vaccinated against COVID with Moderna.

## 2021-09-21 VITALS
DIASTOLIC BLOOD PRESSURE: 88 MMHG | RESPIRATION RATE: 18 BRPM | TEMPERATURE: 98 F | SYSTOLIC BLOOD PRESSURE: 125 MMHG | HEART RATE: 72 BPM | OXYGEN SATURATION: 99 %

## 2021-09-21 PROCEDURE — 99282 EMERGENCY DEPT VISIT SF MDM: CPT

## 2021-09-21 RX ADMIN — SODIUM CHLORIDE 1000 MILLILITER(S): 9 INJECTION INTRAMUSCULAR; INTRAVENOUS; SUBCUTANEOUS at 00:50

## 2021-09-22 DIAGNOSIS — K29.20 ALCOHOLIC GASTRITIS WITHOUT BLEEDING: ICD-10-CM

## 2021-09-22 DIAGNOSIS — Z79.82 LONG TERM (CURRENT) USE OF ASPIRIN: ICD-10-CM

## 2021-09-22 DIAGNOSIS — E86.0 DEHYDRATION: ICD-10-CM

## 2021-09-22 DIAGNOSIS — E87.2 ACIDOSIS: ICD-10-CM

## 2021-09-22 DIAGNOSIS — R10.9 UNSPECIFIED ABDOMINAL PAIN: ICD-10-CM

## 2021-09-22 DIAGNOSIS — E78.2 MIXED HYPERLIPIDEMIA: ICD-10-CM
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
## 2021-09-22 DIAGNOSIS — Z20.822 CONTACT WITH AND (SUSPECTED) EXPOSURE TO COVID-19: ICD-10-CM

## 2021-09-22 DIAGNOSIS — Z91.14 PATIENT'S OTHER NONCOMPLIANCE WITH MEDICATION REGIMEN: ICD-10-CM

## 2021-09-22 DIAGNOSIS — F10.129 ALCOHOL ABUSE WITH INTOXICATION, UNSPECIFIED: ICD-10-CM

## 2021-09-27 ENCOUNTER — EMERGENCY (EMERGENCY)
Facility: HOSPITAL | Age: 56
LOS: 0 days | Discharge: HOME | End: 2021-09-27
Attending: EMERGENCY MEDICINE
Payer: MEDICAID

## 2021-09-27 VITALS
RESPIRATION RATE: 18 BRPM | OXYGEN SATURATION: 99 % | SYSTOLIC BLOOD PRESSURE: 120 MMHG | DIASTOLIC BLOOD PRESSURE: 87 MMHG | HEART RATE: 95 BPM

## 2021-09-27 VITALS
DIASTOLIC BLOOD PRESSURE: 91 MMHG | HEART RATE: 99 BPM | WEIGHT: 190.04 LBS | OXYGEN SATURATION: 99 % | HEIGHT: 67 IN | SYSTOLIC BLOOD PRESSURE: 125 MMHG | TEMPERATURE: 99 F | RESPIRATION RATE: 20 BRPM

## 2021-09-27 DIAGNOSIS — E78.2 MIXED HYPERLIPIDEMIA: ICD-10-CM
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
## 2021-09-27 DIAGNOSIS — R25.1 TREMOR, UNSPECIFIED: ICD-10-CM

## 2021-09-27 DIAGNOSIS — Z87.19 PERSONAL HISTORY OF OTHER DISEASES OF THE DIGESTIVE SYSTEM: ICD-10-CM

## 2021-09-27 DIAGNOSIS — R11.10 VOMITING, UNSPECIFIED: ICD-10-CM

## 2021-09-27 DIAGNOSIS — F10.230 ALCOHOL DEPENDENCE WITH WITHDRAWAL, UNCOMPLICATED: ICD-10-CM

## 2021-09-27 PROCEDURE — 99284 EMERGENCY DEPT VISIT MOD MDM: CPT

## 2021-09-27 RX ORDER — ONDANSETRON 8 MG/1
4 TABLET, FILM COATED ORAL ONCE
Refills: 0 | Status: DISCONTINUED | OUTPATIENT
Start: 2021-09-27 | End: 2021-09-27

## 2021-09-27 RX ORDER — SODIUM CHLORIDE 9 MG/ML
1000 INJECTION INTRAMUSCULAR; INTRAVENOUS; SUBCUTANEOUS ONCE
Refills: 0 | Status: COMPLETED | OUTPATIENT
Start: 2021-09-27 | End: 2021-09-27

## 2021-09-27 RX ORDER — FAMOTIDINE 10 MG/ML
20 INJECTION INTRAVENOUS ONCE
Refills: 0 | Status: COMPLETED | OUTPATIENT
Start: 2021-09-27 | End: 2021-09-27

## 2021-09-27 RX ORDER — ONDANSETRON 8 MG/1
4 TABLET, FILM COATED ORAL ONCE
Refills: 0 | Status: COMPLETED | OUTPATIENT
Start: 2021-09-27 | End: 2021-09-27

## 2021-09-27 RX ADMIN — SODIUM CHLORIDE 1000 MILLILITER(S): 9 INJECTION INTRAMUSCULAR; INTRAVENOUS; SUBCUTANEOUS at 18:08

## 2021-09-27 RX ADMIN — Medication 25 MILLIGRAM(S): at 18:37

## 2021-09-27 RX ADMIN — FAMOTIDINE 20 MILLIGRAM(S): 10 INJECTION INTRAVENOUS at 20:31

## 2021-09-27 RX ADMIN — Medication 30 MILLILITER(S): at 20:31

## 2021-09-27 RX ADMIN — Medication 25 MILLIGRAM(S): at 20:31

## 2021-09-27 RX ADMIN — Medication 1 MILLIGRAM(S): at 15:51

## 2021-09-27 RX ADMIN — ONDANSETRON 4 MILLIGRAM(S): 8 TABLET, FILM COATED ORAL at 18:37

## 2021-09-27 NOTE — ED ADULT NURSE NOTE - NSFALLRSKINDICATORS_ED_ALL_ED
Dear Dr. Hooks,     I saw our mutual patient today for routine f/u and management of his pacemaker along with AF.  He is doing well from an EP perspective.      He updated me about his C-diff infection in November and reports he continues to experience frequent BMs and diarrhea.  I offered to reach out to you given he has grave concerns over his symptoms as this may warrant further eval.     Please let me know if you have any questions. Thank you for your time    no

## 2021-09-27 NOTE — ED PROVIDER NOTE - OBJECTIVE STATEMENT
56 year old male w/PMH of EtOh abuse, EtOH withdrawal, EtOH gastritis presents to the ED for shakiness. Pt reports he has not drank in x3 days. No fever/chills, cough, CP or SOB. Does endorse some vomiting

## 2021-09-27 NOTE — ED ADULT NURSE NOTE - OBJECTIVE STATEMENT
pt upon arrival was hold his abdomen stated that he was having ADB pain for the past 3 days-pt denies ETOH use for the past 3 days

## 2021-09-27 NOTE — ED ADULT NURSE NOTE - ED STAT RN HANDOFF DETAILS
Report received from Teresa OLSEN. Patient in no acute distress. Patient denies pain and discomfort. Patient vitals are within normal limits. Spoke with Dr. Dinh about labwork not being sent- as per Dr. Dinh bloodwork not needed because patient is in no acute distress, patient was given 1L of fluids and patient tremors have subsided. CIWA score is per EMR. Patient to be discharged. Will continue to monitor.

## 2021-09-27 NOTE — ED ADULT NURSE NOTE - PAIN RATING/NUMBER SCALE (0-10): ACTIVITY
DATE: 19    PATIENT: Olivia Nguyen  MRN:  [id number]  :  1950      REASON FOR VISIT:  Olivia Nguyen is an established patient her today for   Chief Complaint   Patient presents with   • Follow-up     bump on L wrist, knee pain bilateral, urine freq and odor.          HISTORY OF PRESENT ILLNESS:  GENERAL:  Mrs. Nguyen is a very nice 69-year-old lady with past medical history of hypertension, and diabetes mellitus type II.  The patient is in the office today for her scheduled followup appointment, and the following complaints:     FREQUENCY:  The patient complained of frequency, and urgency, started a couple of months ago, denied any burning, pain, or hematuria     HYPERTENSION:  The patient has a history of hypertension, has not been taking her medicine regularly     DIABETES MELLITUS:  The patient has a history of type II diabetes mellitus, admitted not taking her medication, and lack of adherence to healthy diet  Most recent hemoglobin A1c was  Hemoglobin A1C (%)   Date Value   2018 6.4 (H)     REVIEW OF SYSTEM:  Const: No fever, no chills, not feeling tired and no anorexia.   Allergy & Immunology: no angioedema and no urticaria.   Eyes: No eye pain, no red eyes, no blurred vision and no change in vision.   ENT: No nasal passage blockage, no nasal discharge, no earache, no discharge from the ears, no sore throat, no hoarsens, and no dysphagia.   CV: No chest pain, no palpitations, no lightheadedness, no dyspnea on exertion, or orthopnea.   Resp: No cough, no snoring, no short of breath, and no wheezing.   Breast: No pain in breast, no breast lump and no nipple discharge.   GI: No abdominal pain, no nausea, no vomiting, no diarrhea, no constipation, no bright red blood per rectum, no melena and no bowel habit changes.   : Positive for: Frequency, and urgency  No dysuria, no hematuria, and no vaginal discharge.  Endo: No hot flashes, no polyuria, no polydipsia and no intolerance to cold.   Heme/Lymph:  No anemia, no tendency for easy bleeding and no tendency for easy bruising.   Musc:Positive for: Bilateral Knee Pain   No joint swelling, no joint stiffness, no back pain, no muscle aches and no muscle swelling.   Neuro: No confusion, no difficulty walking, no dizziness, no fainting, no headache, no memory lapses or loss, no numbness and no tremors.   Psych: No anxiety, no depression, and not feeling sad, or unhappy.   Skin: No bruising, no pruritus, no skin lesions, or rash.     ALLERGIES:  No Known Allergies    Current Outpatient Medications   Medication Sig Dispense Refill   • losartan-hydrochlorothiazide (HYZAAR) 100-25 MG per tablet Take 1 tablet by mouth daily. 90 tablet 1   • amLODIPine (NORVASC) 2.5 MG tablet Take 1 tablet by mouth daily. 90 tablet 1   • metformin (GLUCOPHAGE) 1000 MG tablet Take 1 tablet by mouth 2 times daily (with meals). 180 tablet 1   • oxybutynin (DITROPAN-XL) 10 MG 24 hr tablet Take 1 tablet by mouth daily. 90 tablet 1   • atorvastatin (LIPITOR) 20 MG tablet Take 1 tablet by mouth daily. 90 tablet 1     No current facility-administered medications for this visit.      PAST MEDICAL HISTORY:  Benign essential hypertension  Diabetes mellitus type 2  Noncompliance with treatment plan  Multinodular goiter    PAST SURGICAL HISTORY:  Aspiration of Ovarian Cyst: 1974  Cornea Transplant, Left Eye:   Cornea Transplant, Right Eye:   Fine Needle Aspiration of Thyroid: 2018  Bilateral Cataract Extraction: 2018    FAMILY HISTORY:  Father: , Age 70, Hepatitis C  Mother: , Age 76, Diabetes Mellitus  Brother: , Unknown  Sister: , Age 68, Cancer  Sister, , Age 64, Cancer, CVA    SOCIAL HISTORY:  Tobacco: Former Smoker, Quit   Alcohol: No alcohol consumption  Caffeine: Occasional caffeine consumption  Drugs: Does not use illicit drugs  Marital status:   Children: Has no children  Work: Retired  Residence: Lives independently  with   Exercise: Limited by physical condition  Assistive device: None  Durable Power of : N/A    Visit Vitals  BP (!) 166/100 (BP Location: LUE - Left upper extremity, Patient Position: Sitting)   Pulse 64   Temp 96.8 °F (36 °C)   Resp 18   Ht 5' 4\" (1.626 m)   Wt 110.6 kg (243 lb 13.3 oz)   BMI 41.85 kg/m²     PHYSICAL EXAM:  Constitutional: Alert and in no acute distress.   Head and Face: Atraumatic, normocephalic.   Eyes: No discharge, normal conjunctiva, no eyelid swelling, sclerae were normal, and extraocular movements were intact.   ENT:  Normal appearing nose. no nasal discharge. Normal appearing outer ear,normal lips. oral mucosa pink and moist, no oral lesions, normal appearing pharynx, normal appearing tongue.   Neck: Normal appearing neck and supple neck. thyroid not enlarged.   Lymphatic: No lymphadenopathy.   Chest: Normal chest appearance.   Pulmonary: No respiratory distress. breath sounds clear to auscultation bilaterally, without rales or wheezing.  Cardiovascular: Normal rate, and regular rhythm. No murmurs were heard  No carotid bruit bilaterally  Right posterior tibialis 2+ right dorsalis pedis 2+   Left posterior tibialis 2+ left dorsalis pedis 2+   Edema was not present in the lower extremities.   Abdomen: Soft, nontender, nondistended, normal bowel sounds and no abdominal mass. no hepatomegaly and no splenomegaly.   Musculoskeletal: No joint swelling seen and no joint tenderness was elicited.   Neurologic: No sensory deficits noted. no coordination deficits. normal gait.   Psychiatric: Alert and awake, interactive and mood/affect were appropriate.   Dermatology: Normal skin color and pigmentation. no skin lesions.       ASSESSMENT AND PLAN:  I reviewed and discussed current active medical problems, findings of physical examination, explained treatment options, and plan of care. The patient was given the opportunity to ask questions.  The following diagnoses were addressed  during this visit:    1. Frequency of urination  Probably secondary to overactive bladder showed no evidence of any significant abnormality  - POCT URINE DIP AUTO  -Start oxybutynin (DITROPAN-XL) 10 MG 24 hr tablet; Take 1 tablet by mouth daily.  Dispense: 90 tablet; Refill: 1    2. Benign essential hypertension  Blood pressure is uncontrolled, and patient was noncompliant with taking medications.  Will start amlodipine 5 mg daily, and resume losartan-hydrochlorothiazide 100- 25  The patient was advised to take medications regularly.  I explained the benefit of blood pressure control in preventing end organ damage especially to the brain, heart, and kidneys that can be potentially fatal.  Discussed heart-healthy diet, limiting alcohol consumption, limiting salt intake to less than 1500 mg per day, exercising regularly, reducing stress, and achieving healthy body weight and their benefit on controlling blood pressure, and overall health.  The patient was advised to monitor blood pressure regularly at home and bring numbers to the office with every visit.      3. Type 2 diabetes mellitus without complication, without long-term current use of insulin (CMS/[facility])  Noncompliant with taking medications  -Repeat GLYCOHEMOGLOBIN; Future  -Repeat MICROALBUMIN URINE RANDOM; Future  Resume metformin 1000 mg twice a day    4. Pure hypercholesterolemia  Discussed the importance of following healthy diet, and weight loss  -Check COMPREHENSIVE METABOLIC PANEL; Future  -Check LIPID PANEL WITHOUT REFLEX; Future  -Start atorvastatin (LIPITOR) 20 MG tablet; Take 1 tablet by mouth daily.  Dispense: 90 tablet; Refill: 1    5. Encounter for screening mammogram for breast cancer  - MAMMO SCREENING BILATERAL; Future    6. Encounter for osteoporosis screening in asymptomatic postmenopausal patient  - BD DEXA AXIAL SKELETON; Future    7. Need for hepatitis C screening test  - HEPATITIS C ANTIBODY WITH REFLEX; Future    8. Pneumococcal  vaccination administered at current visit  - PNEUMOCOCCAL POLYSACCHARIDE ADULT VACC, IM/SQ (PNEUMOVAX 23)    9. Influenza vaccination administered at current visit  - INFLUENZA ENHANCED HIGH DOSE SPLIT PRES FREE VACC, IM (FLUZONE-HIGH DOSE)        Return in about 4 weeks (around 12/17/2019).    Emmanuel Wallace MD   8

## 2021-09-27 NOTE — ED PROVIDER NOTE - CLINICAL SUMMARY MEDICAL DECISION MAKING FREE TEXT BOX
DDx: EtOH withdrawal /EtOH gastritis, no abdominal tenderness or guarding to suggest surgical abdomen.   Plan: CBC, CMP, fluids, ativan and reassess.

## 2021-09-27 NOTE — ED ADULT NURSE REASSESSMENT NOTE - NS ED NURSE REASSESS COMMENT FT1
Patient walks with a steady gait with vitals within normal limits. CIWA documented and patient is safe for discharge. Dr. Angeles aware of inability to obtain labs- patient has no medical reason to be admitted into the hospital. Patient in no acute distress.

## 2021-09-27 NOTE — ED PROVIDER NOTE - PATIENT PORTAL LINK FT
You can access the FollowMyHealth Patient Portal offered by [facility] by registering at the following website: [email or web address]. By joining [employer]’s FollowMyHealth portal, you will also be able to view your health information using other applications (apps) compatible with our system.

## 2021-10-11 NOTE — DISCHARGE NOTE NURSING/CASE MANAGEMENT/SOCIAL WORK - NSDCPEFALRISK_GEN_ALL_CORE
Patient information on fall and injury prevention Testosterone given per physician order. Patient supplied the medication. If any signs of redness, rash, swelling or unusual symptoms occur please call the office.   UlMoncho Pizarrohadleyjanette [address] [id number]  Lot [id number]  Exp 10/2023    Given in left hip

## 2021-10-15 NOTE — ED ADULT TRIAGE NOTE - AS TEMP SITE
Outpatient Progress Note    Chief Complaint:   Chief Complaint   Patient presents with   • Office Visit     foot swelling, tingling in toe        Narrative summary:   Black is a 65 year old male who is seen for foot swelling. He is currently under the care of Ancelmo Rowe MD. Reports past medical history of hypertension, hyperlipidemia, paroxsymal A. Fib, diabetes mellitus type II, obesity, testicular cancer, gout, and OA. Complains of foot swelling. Onset 8 months ago. Location foot, top front. Constant. Associated symptoms of tingling (onset 6 weeks ago, tingling, intermittent). No recent trauma or injury. Denies back pain or issues. No other related complaints. See ROS for all pertinent negatives.    PAST MEDICAL HISTORY:   Past Medical History:   Diagnosis Date   • AF (paroxysmal atrial fibrillation) (dx 9/2018) 3/13/2019   • Atrial fibrillation (CMS/HCC)     20's   • Constipation    • Diabetes mellitus, type 2 (CMS/HCC)    • Effusion of knee 1/23/2015   • Hemorrhoids 08/27/2018    small internal   • Hx of complete eye exam 09/06/2018    [employer]   • Malignant neoplasm of testis (CMS/HCC) 1/25/2016    S/p surgical removal x2 and radiation therapy x1  In remission >5 years   • Mumps     childhood   • Seminoma (CMS/HCC)     bilateral, radiation therapy   • Special screening for malignant neoplasms, colon 08/27/2018    normal   • Unspecified essential hypertension      Past Surgical History:   Procedure Laterality Date   • Colonoscopy diagnostic  08/27/2018    Dr. Clayton wnl f/u 8/2028   • Dexa bone density axial skeleton  5/14/08   • Femur/knee surg unlisted  1970's and 1980's    bilateral knee fracture repair and removal of hardware   • Open access colonoscopy  2008    Colonoscopy, Screen-normal   • Radical orchiectomy      bilateral-separate surgeries   • Repair ing hernia,5+y/o,reducibl      Hernia, inguinal-left     MEDICATIONS:   Current Outpatient Medications   Medication Sig Dispense  Refill   • blood glucose (Contour Test) test strip Test once per day     • terazosin (HYTRIN) 5 MG capsule Take 2 capsules by mouth nightly. 60 capsule 5   • metoPROLOL tartrate (LOPRESSOR) 50 MG tablet Take 1 tablet by mouth 2 times daily. 60 tablet 5   • metFORMIN (GLUCOPHAGE) 500 MG tablet Take 2 tablets by mouth 2 times daily (with meals). 120 tablet 5   • lisinopril (ZESTRIL) 40 MG tablet Take 1 tablet by mouth daily. 30 tablet 5   • digoxin (LANOXIN) 0.25 MG tablet Take 1 tablet by mouth daily. 30 tablet 5   • apixaBAN (Eliquis) 5 MG Tab Take 1 tablet by mouth 2 times daily. 60 tablet 11   • testosterone 20.25 MG/ACT (1.62%) gel APPLY 3 PUMPS TO DRY, INTACT SKIN OF THE UPPER ARMS DAILY 150 g 4     No current facility-administered medications for this visit.     ALLERGIES:   ALLERGIES:   Allergen Reactions   • Contrast Media    • Iodides Other (See Comments)     Review Of Systems:  Review of Systems   Musculoskeletal: Positive for joint swelling. Negative for back pain, joint pain and muscle weakness.   Neurological: Positive for paresthesias. Negative for numbness.      OBJECTIVE:  Vital signs:   Visit Vitals  BP (!) 148/82 (Patient Position: Sitting, Cuff Size: Regular)   Pulse 62   Temp 98.3 °F (36.8 °C) (Oral)   Resp 14   Ht 6' 2\" (1.88 m)   Wt 104.6 kg (230 lb 9.6 oz)   SpO2 99%   BMI 29.61 kg/m²      Exam:  Physical Exam  Vitals and nursing note reviewed.   Constitutional:       General: He is awake.      Appearance: Normal appearance. He is well-developed, well-groomed and overweight.      Comments: Elevated blood pressure   Cardiovascular:      Pulses:           Dorsalis pedis pulses are 2+ on the left side.        Posterior tibial pulses are 2+ on the left side.   Musculoskeletal:      Left foot: Normal range of motion and normal capillary refill. Swelling, deformity and bunion present. No tenderness, bony tenderness or crepitus. Normal pulse.        Feet:    Feet:      Left foot:      Skin integrity:  Skin integrity normal.      Toenail Condition: Left toenails are normal.   Skin:     General: Skin is warm and moist.      Findings: No rash.   Neurological:      Mental Status: He is alert and oriented to person, place, and time.      Sensory: Sensation is intact. No sensory deficit.      Motor: Motor function is intact. No weakness.      Gait: Gait is intact. Gait normal.   Psychiatric:         Behavior: Behavior is cooperative.       SCREENING:  PHQ-9 Screening:  Over the last 2 weeks, how often have you been bothered by the following problems?          PHQ2 Score: 0  PHQ2 Score Interpretation: No further screening needed  1. Little interest or pleasure in activity?: 0  2. Feeling down, depressed, or hopeless?: 0       DEPRESSION ASSESSMENT/PLAN:  Depression screening is negative no further plan needed.    ASSESSMENT/PLAN:  1. Foot swelling - unknown etiology of swelling of 2nd/3rd metatarsal region of left foot, possibly neuroma, ganglion cyst, bone spur, or localized edema secondary to venous insufficiency. No pain. Doubtful gout given duration. Noted to have significant lateral deviation of left greater toe, consistent with hallux valgus. Intermittent paresthesia possibly neuropathy given hx of diabetes. Multiple other diagnoses considered. Ordered x-ray of foot to r/o any abnormalities, will call with results. Placed referral to podiatry. Advised to elevate foot. Advised to return if no improvement, sooner if worsening. Patient agreeable with plan.  -     XR Foot 3 + View Left  -     SERVICE TO PODIATRY  2. Paresthesia of left foot - see above.  -     SERVICE TO PODIATRY  3. Hallux valgus of left foot - see above.  -     SERVICE TO PODIATRY  4. Elevated blood pressure reading - incidentally noted to have elevated blood pressure(s) on exam. Patient has history of hypertension. Elevated blood pressure(s) likely secondary to uncertain etiology. Hypertensive symptoms: no cardiac symptoms. Treatment plan: Will  defer further adjustments of anti-hypertensive management as needed per PCP.. Advised to monitor blood pressure at home, and return if above goals or symptomatic. Patient agreeable with plan.  5. Screening for depression - Depression Screening performed and normal screening, continue to monitor for symptoms over time. Screening time with patient was 15 minutes. Reviewed and discussed with patient. Advised to return as needed. Patient agreeable with plan.    No problem-specific Assessment & Plan notes found for this encounter.    Health Maintenance Due   Topic Date Due   • Shingles Vaccine (1 of 2) Never done   • Diabetes Eye Exam  03/01/2020   • Influenza Vaccine (1) 09/01/2021     Follow up:   Return if symptoms worsen or fail to improve.    Patient will be notified of results when they become available.    LEROY Granados  10/15/2021  9:12 AM   oral

## 2021-10-28 ENCOUNTER — EMERGENCY (EMERGENCY)
Facility: HOSPITAL | Age: 56
LOS: 0 days | Discharge: HOME | End: 2021-10-28
Attending: EMERGENCY MEDICINE
Payer: MEDICAID

## 2021-10-28 VITALS
TEMPERATURE: 98 F | HEART RATE: 90 BPM | SYSTOLIC BLOOD PRESSURE: 135 MMHG | RESPIRATION RATE: 17 BRPM | WEIGHT: 167.99 LBS | DIASTOLIC BLOOD PRESSURE: 88 MMHG | HEIGHT: 67 IN | OXYGEN SATURATION: 98 %

## 2021-10-28 VITALS
TEMPERATURE: 98 F | SYSTOLIC BLOOD PRESSURE: 138 MMHG | HEART RATE: 64 BPM | RESPIRATION RATE: 17 BRPM | OXYGEN SATURATION: 99 % | DIASTOLIC BLOOD PRESSURE: 64 MMHG

## 2021-10-28 DIAGNOSIS — R10.84 GENERALIZED ABDOMINAL PAIN: ICD-10-CM

## 2021-10-28 DIAGNOSIS — R07.9 CHEST PAIN, UNSPECIFIED: ICD-10-CM

## 2021-10-28 DIAGNOSIS — Z87.11 PERSONAL HISTORY OF PEPTIC ULCER DISEASE: ICD-10-CM
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
## 2021-10-28 DIAGNOSIS — E78.2 MIXED HYPERLIPIDEMIA: ICD-10-CM

## 2021-10-28 DIAGNOSIS — R11.2 NAUSEA WITH VOMITING, UNSPECIFIED: ICD-10-CM

## 2021-10-28 DIAGNOSIS — Z79.82 LONG TERM (CURRENT) USE OF ASPIRIN: ICD-10-CM

## 2021-10-28 LAB
ALBUMIN SERPL ELPH-MCNC: 4 G/DL — SIGNIFICANT CHANGE UP (ref 3.3–5)
ALP SERPL-CCNC: 57 U/L — SIGNIFICANT CHANGE UP (ref 40–120)
ALT FLD-CCNC: 22 U/L — SIGNIFICANT CHANGE UP (ref 12–78)
ANION GAP SERPL CALC-SCNC: 6 MMOL/L — SIGNIFICANT CHANGE UP (ref 5–17)
AST SERPL-CCNC: 29 U/L — SIGNIFICANT CHANGE UP (ref 15–37)
BASOPHILS # BLD AUTO: 0.05 K/UL — SIGNIFICANT CHANGE UP (ref 0–0.2)
BASOPHILS NFR BLD AUTO: 1.3 % — SIGNIFICANT CHANGE UP (ref 0–2)
BILIRUB SERPL-MCNC: 0.4 MG/DL — SIGNIFICANT CHANGE UP (ref 0.2–1.2)
BUN SERPL-MCNC: 13 MG/DL — SIGNIFICANT CHANGE UP (ref 7–23)
CALCIUM SERPL-MCNC: 8.8 MG/DL — SIGNIFICANT CHANGE UP (ref 8.5–10.1)
CHLORIDE SERPL-SCNC: 111 MMOL/L — HIGH (ref 96–108)
CK SERPL-CCNC: 201 U/L — SIGNIFICANT CHANGE UP (ref 26–308)
CO2 SERPL-SCNC: 25 MMOL/L — SIGNIFICANT CHANGE UP (ref 22–31)
CREAT SERPL-MCNC: 1.06 MG/DL — SIGNIFICANT CHANGE UP (ref 0.5–1.3)
EOSINOPHIL # BLD AUTO: 0.13 K/UL — SIGNIFICANT CHANGE UP (ref 0–0.5)
EOSINOPHIL NFR BLD AUTO: 3.4 % — SIGNIFICANT CHANGE UP (ref 0–6)
ETHANOL SERPL-MCNC: 108 MG/DL — HIGH (ref 0–10)
GLUCOSE SERPL-MCNC: 101 MG/DL — HIGH (ref 70–99)
HCT VFR BLD CALC: 39 % — SIGNIFICANT CHANGE UP (ref 39–50)
HGB BLD-MCNC: 12.4 G/DL — LOW (ref 13–17)
IMM GRANULOCYTES NFR BLD AUTO: 0.5 % — SIGNIFICANT CHANGE UP (ref 0–1.5)
LIDOCAIN IGE QN: 201 U/L — SIGNIFICANT CHANGE UP (ref 73–393)
LYMPHOCYTES # BLD AUTO: 1.42 K/UL — SIGNIFICANT CHANGE UP (ref 1–3.3)
LYMPHOCYTES # BLD AUTO: 37.7 % — SIGNIFICANT CHANGE UP (ref 13–44)
MANUAL SMEAR VERIFICATION: SIGNIFICANT CHANGE UP
MCHC RBC-ENTMCNC: 23.6 PG — LOW (ref 27–34)
MCHC RBC-ENTMCNC: 31.8 GM/DL — LOW (ref 32–36)
MCV RBC AUTO: 74.1 FL — LOW (ref 80–100)
MICROCYTES BLD QL: SLIGHT — SIGNIFICANT CHANGE UP
MONOCYTES # BLD AUTO: 0.32 K/UL — SIGNIFICANT CHANGE UP (ref 0–0.9)
MONOCYTES NFR BLD AUTO: 8.5 % — SIGNIFICANT CHANGE UP (ref 2–14)
NEUTROPHILS # BLD AUTO: 1.83 K/UL — SIGNIFICANT CHANGE UP (ref 1.8–7.4)
NEUTROPHILS NFR BLD AUTO: 48.6 % — SIGNIFICANT CHANGE UP (ref 43–77)
NRBC # BLD: 0 /100 WBCS — SIGNIFICANT CHANGE UP (ref 0–0)
PLAT MORPH BLD: NORMAL — SIGNIFICANT CHANGE UP
PLATELET # BLD AUTO: 342 K/UL — SIGNIFICANT CHANGE UP (ref 150–400)
POTASSIUM SERPL-MCNC: 3.9 MMOL/L — SIGNIFICANT CHANGE UP (ref 3.5–5.3)
POTASSIUM SERPL-SCNC: 3.9 MMOL/L — SIGNIFICANT CHANGE UP (ref 3.5–5.3)
PROT SERPL-MCNC: 8.6 GM/DL — HIGH (ref 6–8.3)
RBC # BLD: 5.26 M/UL — SIGNIFICANT CHANGE UP (ref 4.2–5.8)
RBC # FLD: 21.3 % — HIGH (ref 10.3–14.5)
RBC BLD AUTO: SIGNIFICANT CHANGE UP
SODIUM SERPL-SCNC: 142 MMOL/L — SIGNIFICANT CHANGE UP (ref 135–145)
TROPONIN I, HIGH SENSITIVITY RESULT: 8.2 NG/L — SIGNIFICANT CHANGE UP
WBC # BLD: 3.77 K/UL — LOW (ref 3.8–10.5)
WBC # FLD AUTO: 3.77 K/UL — LOW (ref 3.8–10.5)

## 2021-10-28 PROCEDURE — 74176 CT ABD & PELVIS W/O CONTRAST: CPT | Mod: 26

## 2021-10-28 PROCEDURE — 99285 EMERGENCY DEPT VISIT HI MDM: CPT

## 2021-10-28 PROCEDURE — 93010 ELECTROCARDIOGRAM REPORT: CPT

## 2021-10-28 PROCEDURE — 71045 X-RAY EXAM CHEST 1 VIEW: CPT | Mod: 26

## 2021-10-28 RX ORDER — FAMOTIDINE 10 MG/ML
20 INJECTION INTRAVENOUS DAILY
Refills: 0 | Status: DISCONTINUED | OUTPATIENT
Start: 2021-10-28 | End: 2021-10-28

## 2021-10-28 RX ORDER — MORPHINE SULFATE 50 MG/1
4 CAPSULE, EXTENDED RELEASE ORAL ONCE
Refills: 0 | Status: DISCONTINUED | OUTPATIENT
Start: 2021-10-28 | End: 2021-10-28

## 2021-10-28 RX ORDER — PANTOPRAZOLE SODIUM 20 MG/1
1 TABLET, DELAYED RELEASE ORAL
Qty: 30 | Refills: 0
Start: 2021-10-28 | End: 2021-11-26

## 2021-10-28 RX ADMIN — MORPHINE SULFATE 4 MILLIGRAM(S): 50 CAPSULE, EXTENDED RELEASE ORAL at 08:05

## 2021-10-28 RX ADMIN — Medication 25 MILLIGRAM(S): at 14:09

## 2021-10-28 RX ADMIN — MORPHINE SULFATE 4 MILLIGRAM(S): 50 CAPSULE, EXTENDED RELEASE ORAL at 12:22

## 2021-10-28 RX ADMIN — Medication 25 MILLIGRAM(S): at 09:14

## 2021-10-28 RX ADMIN — Medication 30 MILLILITER(S): at 14:09

## 2021-10-28 RX ADMIN — MORPHINE SULFATE 4 MILLIGRAM(S): 50 CAPSULE, EXTENDED RELEASE ORAL at 12:37

## 2021-10-28 RX ADMIN — MORPHINE SULFATE 4 MILLIGRAM(S): 50 CAPSULE, EXTENDED RELEASE ORAL at 08:20

## 2021-10-28 RX ADMIN — FAMOTIDINE 20 MILLIGRAM(S): 10 INJECTION INTRAVENOUS at 14:09

## 2021-10-28 NOTE — ED PROVIDER NOTE - CONSTITUTIONAL, MLM
normal... Well appearing, awake, alert, oriented to person, place, time/situation and writhing in pain

## 2021-10-28 NOTE — ED PROVIDER NOTE - NSFOLLOWUPINSTRUCTIONS_ED_ALL_ED_FT
1) DO NOT ABUSE ALCOHOL!  2) Follow up with your primary care doctor.  3) Follow-up with GI doctor.  4) Take prescription mediation as instructed.  5) Return to the ER for worsening or concerning symptoms

## 2021-10-28 NOTE — ED ADULT NURSE NOTE - OBJECTIVE STATEMENT
received pt in bed 9, 57yo M AOx4, hx of ETOH abuse, nkda, presents to ED for chest pain x 2-3 weeks, last drink yesterday morning , per EMS refuse nitro and ASA.  No other complaints at this time.

## 2021-10-28 NOTE — ED PROVIDER NOTE - CLINICAL SUMMARY MEDICAL DECISION MAKING FREE TEXT BOX
Patient c/o abdominal pain documented history of alcohol abuse diffusely tender and uncomfortable in the ER no rigid abdomen.  Possible gastritis r/o perforated peptic ulcer, colitis, bowel obstruction.  Will check labs, CXR, CT give medication for pain.  Patient also possibly in mild alcohol withdrawal will check CIWA score and Re-eval.

## 2021-10-28 NOTE — ED PROVIDER NOTE - CARE PROVIDERS DIRECT ADDRESSES
[email or web address]

## 2021-10-28 NOTE — ED PROVIDER NOTE - OBJECTIVE STATEMENT
This patient is a 56 year old man hx of alcohol dependence who presents to the ER c/o abdominal pain and chest pain x 2-3 weeks.  He states that the pain is 10/10 in severity and improves with alcohol.  Last drink of alcohol was yesterday.  Patient denies daily alcohol use.  He reports nausea and vomiting.  The pain is diffuse in the abdomen and radiates to the chest.  Patient states that he tried drinking juice this morning and the symptoms worsened.  He reports that he called his PMD yesterday and was given a prescription but cannot recall the name of the medication.

## 2021-10-28 NOTE — ED PROVIDER NOTE - ABDOMINAL TENDER
left upper quadrant/right upper quadrant/left lower quadrant/right lower quadrant/periumbilical/umbilical/suprapubic/epigastric

## 2021-10-28 NOTE — ED PROVIDER NOTE - CARE PROVIDER_API CALL
Jarrod Rossi  [facility]  [address], [address]  [place] [address]  Phone: [phone]  Fax: [phone]  Follow Up Time:

## 2021-10-28 NOTE — ED PROVIDER NOTE - PATIENT PORTAL LINK FT
You can access the FollowMyHealth Patient Portal offered by [facility] by registering at the following website: [email or web address]. By joining [facility]’s FollowMyHealth portal, you will also be able to view your health information using other applications (apps) compatible with our system.

## 2021-10-28 NOTE — ED ADULT NURSE REASSESSMENT NOTE - NS ED NURSE REASSESS COMMENT FT1
patient CIWA 7, complains of 10/10 stomach pain, pt states he drinks liquor. Dr. Pisano made aware, pt VS stable.
patient a&ox4, ambulatory self steady gait, absence of pain, no tremors noted, VS stable. ok to be discharged

## 2021-11-02 NOTE — DISCHARGE NOTE PROVIDER - NSDCDCMDCOMP_GEN_ALL_CORE
Subjective: no complaints, denies CP, palpitations, SOB, cough, fever, chills, itchiness/rash, diaphoresis, vision changes, HA, dizziness/lightheadedness, numbness/tingling, abd pain, N/V     T(C): 36.4 (11-02-21 @ 14:30), Max: 37.1 (11-02-21 @ 04:33)  HR: 83 (11-02-21 @ 16:00) (83 - 98)  BP: 124/82 (11-02-21 @ 16:00) (98/65 - 157/101)  RR: 17 (11-02-21 @ 16:00) (16 - 21)  SpO2: 98% (11-02-21 @ 16:00) (90% - 99%)    11-02    136  |  98  |  25.3<H>  ----------------------------<  158<H>  4.4   |  23.0  |  0.75    Ca    8.1<L>      02 Nov 2021 07:31                                 10.3   12.40 )-----------( 357      ( 02 Nov 2021 07:31 )             30.4        PTT - ( 01 Nov 2021 12:14 )  PTT:34.1 sec            CAPILLARY BLOOD GLUCOSE      POCT Blood Glucose.: 128 mg/dL (02 Nov 2021 16:37)  POCT Blood Glucose.: 220 mg/dL (02 Nov 2021 11:33)  POCT Blood Glucose.: 203 mg/dL (02 Nov 2021 07:41)  POCT Blood Glucose.: 203 mg/dL (01 Nov 2021 21:50    I&O's Detail    01 Nov 2021 07:01  -  02 Nov 2021 07:00  --------------------------------------------------------  IN:  Total IN: 0 mL    OUT:    Open/Penrose (mL): 100 mL  Total OUT: 100 mL  Total NET: -100 mL    02 Nov 2021 07:01  -  02 Nov 2021 17:36  --------------------------------------------------------  IN:  Total IN: 0 mL    OUT:    Voided (mL): 500 mL  Total OUT: 500 mL  Total NET: -500 mL  Drug Dosing Weight  Height (cm): 177.8 (01 Nov 2021 14:05)  Weight (kg): 72.575 (01 Nov 2021 14:05)  BMI (kg/m2): 23 (01 Nov 2021 14:05)  BSA (m2): 1.9 (01 Nov 2021 14:05)    MEDICATIONS  (STANDING):  dextrose 40% Gel 15 Gram(s) Oral once  dextrose 5%. 1000 milliLiter(s) (50 mL/Hr) IV Continuous <Continuous>  dextrose 5%. 1000 milliLiter(s) (100 mL/Hr) IV Continuous <Continuous>  dextrose 50% Injectable 25 Gram(s) IV Push once  dextrose 50% Injectable 12.5 Gram(s) IV Push once  dextrose 50% Injectable 25 Gram(s) IV Push once  glucagon  Injectable 1 milliGRAM(s) IntraMuscular once  insulin lispro (ADMELOG) corrective regimen sliding scale   SubCutaneous Before meals and at bedtime  lactated ringers. 1000 milliLiter(s) (75 mL/Hr) IV Continuous <Continuous>  pantoprazole  Injectable 40 milliGRAM(s) IV Push two times a day  piperacillin/tazobactam IVPB.. 3.375 Gram(s) IV Intermittent every 8 hours    MEDICATIONS  (PRN):  acetaminophen     Tablet .. 650 milliGRAM(s) Oral every 6 hours PRN Temp greater or equal to 38C (100.4F), Mild Pain (1 - 3)  aluminum hydroxide/magnesium hydroxide/simethicone Suspension 30 milliLiter(s) Oral every 4 hours PRN Dyspepsia  melatonin 3 milliGRAM(s) Oral at bedtime PRN Insomnia  ondansetron Injectable 4 milliGRAM(s) IV Push every 8 hours PRN Nausea and/or Vomiting    Physical Exam  Gen: NAD  Neuro: A&Ox3 non focal speech clear and intact  HEENT: L forehead lac with sutures, steris to nose  Pulm: CTA b/l no wheezing  CV: S1S2 RRR  Abd: +BS soft NT ND  Extrem/MS: no edema or cyanosis, WWP WYATT  pericardial tube to gravity drainage, serous drainage       This document is complete and the patient is ready for discharge.

## 2021-11-17 ENCOUNTER — INPATIENT (INPATIENT)
Facility: HOSPITAL | Age: 56
LOS: 0 days | Discharge: LEFT BEFORE DISCHARGE | End: 2021-11-18
Attending: INTERNAL MEDICINE | Admitting: INTERNAL MEDICINE
Payer: MEDICAID

## 2021-11-17 VITALS
TEMPERATURE: 99 F | SYSTOLIC BLOOD PRESSURE: 134 MMHG | HEIGHT: 67 IN | OXYGEN SATURATION: 98 % | DIASTOLIC BLOOD PRESSURE: 84 MMHG | HEART RATE: 136 BPM | WEIGHT: 190.04 LBS | RESPIRATION RATE: 20 BRPM

## 2021-11-17 DIAGNOSIS — K29.20 ALCOHOLIC GASTRITIS WITHOUT BLEEDING: ICD-10-CM

## 2021-11-17 DIAGNOSIS — E87.2 ACIDOSIS: ICD-10-CM

## 2021-11-17 DIAGNOSIS — F10.230 ALCOHOL DEPENDENCE WITH WITHDRAWAL, UNCOMPLICATED: ICD-10-CM

## 2021-11-17 DIAGNOSIS — E87.6 HYPOKALEMIA: ICD-10-CM

## 2021-11-17 LAB
ALBUMIN SERPL ELPH-MCNC: 4.1 G/DL — SIGNIFICANT CHANGE UP (ref 3.3–5)
ALP SERPL-CCNC: 56 U/L — SIGNIFICANT CHANGE UP (ref 40–120)
ALT FLD-CCNC: 24 U/L — SIGNIFICANT CHANGE UP (ref 12–78)
ANION GAP SERPL CALC-SCNC: 21 MMOL/L — HIGH (ref 5–17)
ANISOCYTOSIS BLD QL: SLIGHT — SIGNIFICANT CHANGE UP
APTT BLD: 25.1 SEC — LOW (ref 27.5–35.5)
AST SERPL-CCNC: 26 U/L — SIGNIFICANT CHANGE UP (ref 15–37)
BASE EXCESS BLDA CALC-SCNC: -4.5 MMOL/L — LOW (ref -2–3)
BASOPHILS # BLD AUTO: 0.07 K/UL — SIGNIFICANT CHANGE UP (ref 0–0.2)
BASOPHILS NFR BLD AUTO: 0.7 % — SIGNIFICANT CHANGE UP (ref 0–2)
BILIRUB SERPL-MCNC: 0.4 MG/DL — SIGNIFICANT CHANGE UP (ref 0.2–1.2)
BLOOD GAS COMMENTS: SIGNIFICANT CHANGE UP
BLOOD GAS COMMENTS: SIGNIFICANT CHANGE UP
BUN SERPL-MCNC: 20 MG/DL — SIGNIFICANT CHANGE UP (ref 7–23)
CALCIUM SERPL-MCNC: 9.3 MG/DL — SIGNIFICANT CHANGE UP (ref 8.5–10.1)
CHLORIDE SERPL-SCNC: 108 MMOL/L — SIGNIFICANT CHANGE UP (ref 96–108)
CO2 BLDA-SCNC: 21 MMOL/L — SIGNIFICANT CHANGE UP (ref 19–24)
CO2 SERPL-SCNC: 16 MMOL/L — LOW (ref 22–31)
CREAT SERPL-MCNC: 1.32 MG/DL — HIGH (ref 0.5–1.3)
DACRYOCYTES BLD QL SMEAR: SLIGHT — SIGNIFICANT CHANGE UP
ELLIPTOCYTES BLD QL SMEAR: SLIGHT — SIGNIFICANT CHANGE UP
EOSINOPHIL # BLD AUTO: 0.01 K/UL — SIGNIFICANT CHANGE UP (ref 0–0.5)
EOSINOPHIL NFR BLD AUTO: 0.1 % — SIGNIFICANT CHANGE UP (ref 0–6)
ETHANOL SERPL-MCNC: 95 MG/DL — HIGH (ref 0–10)
GLUCOSE SERPL-MCNC: 112 MG/DL — HIGH (ref 70–99)
HCO3 BLDA-SCNC: 20 MMOL/L — LOW (ref 21–28)
HCT VFR BLD CALC: 40.4 % — SIGNIFICANT CHANGE UP (ref 39–50)
HGB BLD-MCNC: 12.6 G/DL — LOW (ref 13–17)
HOROWITZ INDEX BLDA+IHG-RTO: 0.21 — SIGNIFICANT CHANGE UP
IMM GRANULOCYTES NFR BLD AUTO: 0.5 % — SIGNIFICANT CHANGE UP (ref 0–1.5)
INR BLD: 1.05 RATIO — SIGNIFICANT CHANGE UP (ref 0.88–1.16)
LACTATE SERPL-SCNC: 11.9 MMOL/L — CRITICAL HIGH (ref 0.7–2)
LACTATE SERPL-SCNC: 3.6 MMOL/L — HIGH (ref 0.7–2)
LYMPHOCYTES # BLD AUTO: 1.19 K/UL — SIGNIFICANT CHANGE UP (ref 1–3.3)
LYMPHOCYTES # BLD AUTO: 11.6 % — LOW (ref 13–44)
MACROCYTES BLD QL: SLIGHT — SIGNIFICANT CHANGE UP
MAGNESIUM SERPL-MCNC: 2 MG/DL — SIGNIFICANT CHANGE UP (ref 1.6–2.6)
MANUAL SMEAR VERIFICATION: SIGNIFICANT CHANGE UP
MCHC RBC-ENTMCNC: 23 PG — LOW (ref 27–34)
MCHC RBC-ENTMCNC: 31.2 GM/DL — LOW (ref 32–36)
MCV RBC AUTO: 73.6 FL — LOW (ref 80–100)
MICROCYTES BLD QL: SLIGHT — SIGNIFICANT CHANGE UP
MONOCYTES # BLD AUTO: 0.61 K/UL — SIGNIFICANT CHANGE UP (ref 0–0.9)
MONOCYTES NFR BLD AUTO: 5.9 % — SIGNIFICANT CHANGE UP (ref 2–14)
NEUTROPHILS # BLD AUTO: 8.36 K/UL — HIGH (ref 1.8–7.4)
NEUTROPHILS NFR BLD AUTO: 81.2 % — HIGH (ref 43–77)
NRBC # BLD: 0 /100 WBCS — SIGNIFICANT CHANGE UP (ref 0–0)
PCO2 BLDA: 36 MMHG — SIGNIFICANT CHANGE UP (ref 32–46)
PH BLD: 7.36 — SIGNIFICANT CHANGE UP (ref 7.35–7.45)
PHOSPHATE SERPL-MCNC: 2.8 MG/DL — SIGNIFICANT CHANGE UP (ref 2.5–4.5)
PLAT MORPH BLD: NORMAL — SIGNIFICANT CHANGE UP
PLATELET # BLD AUTO: 379 K/UL — SIGNIFICANT CHANGE UP (ref 150–400)
PO2 BLDA: 78 MMHG — LOW (ref 83–108)
POTASSIUM SERPL-MCNC: 3.3 MMOL/L — LOW (ref 3.5–5.3)
POTASSIUM SERPL-SCNC: 3.3 MMOL/L — LOW (ref 3.5–5.3)
PROT SERPL-MCNC: 8.9 GM/DL — HIGH (ref 6–8.3)
PROTHROM AB SERPL-ACNC: 12.1 SEC — SIGNIFICANT CHANGE UP (ref 10.6–13.6)
RAPID RVP RESULT: SIGNIFICANT CHANGE UP
RBC # BLD: 5.49 M/UL — SIGNIFICANT CHANGE UP (ref 4.2–5.8)
RBC # FLD: 21.4 % — HIGH (ref 10.3–14.5)
RBC BLD AUTO: ABNORMAL
ROULEAUX BLD QL SMEAR: PRESENT
SAO2 % BLDA: 96.8 % — SIGNIFICANT CHANGE UP (ref 94–98)
SARS-COV-2 RNA SPEC QL NAA+PROBE: SIGNIFICANT CHANGE UP
SCHISTOCYTES BLD QL AUTO: SLIGHT — SIGNIFICANT CHANGE UP
SODIUM SERPL-SCNC: 145 MMOL/L — SIGNIFICANT CHANGE UP (ref 135–145)
TARGETS BLD QL SMEAR: SLIGHT — SIGNIFICANT CHANGE UP
WBC # BLD: 10.29 K/UL — SIGNIFICANT CHANGE UP (ref 3.8–10.5)
WBC # FLD AUTO: 10.29 K/UL — SIGNIFICANT CHANGE UP (ref 3.8–10.5)

## 2021-11-17 PROCEDURE — 99291 CRITICAL CARE FIRST HOUR: CPT

## 2021-11-17 PROCEDURE — 99222 1ST HOSP IP/OBS MODERATE 55: CPT

## 2021-11-17 PROCEDURE — 93010 ELECTROCARDIOGRAM REPORT: CPT

## 2021-11-17 RX ORDER — PHENOBARBITAL 60 MG
130 TABLET ORAL
Refills: 0 | Status: DISCONTINUED | OUTPATIENT
Start: 2021-11-17 | End: 2021-11-18

## 2021-11-17 RX ORDER — ATORVASTATIN CALCIUM 80 MG/1
20 TABLET, FILM COATED ORAL AT BEDTIME
Refills: 0 | Status: DISCONTINUED | OUTPATIENT
Start: 2021-11-17 | End: 2021-11-18

## 2021-11-17 RX ORDER — PANTOPRAZOLE SODIUM 20 MG/1
40 TABLET, DELAYED RELEASE ORAL
Refills: 0 | Status: DISCONTINUED | OUTPATIENT
Start: 2021-11-17 | End: 2021-11-18

## 2021-11-17 RX ORDER — POTASSIUM CHLORIDE 20 MEQ
40 PACKET (EA) ORAL ONCE
Refills: 0 | Status: COMPLETED | OUTPATIENT
Start: 2021-11-17 | End: 2021-11-17

## 2021-11-17 RX ORDER — SODIUM CHLORIDE 9 MG/ML
1000 INJECTION INTRAMUSCULAR; INTRAVENOUS; SUBCUTANEOUS
Refills: 0 | Status: DISCONTINUED | OUTPATIENT
Start: 2021-11-17 | End: 2021-11-17

## 2021-11-17 RX ORDER — THIAMINE MONONITRATE (VIT B1) 100 MG
100 TABLET ORAL DAILY
Refills: 0 | Status: DISCONTINUED | OUTPATIENT
Start: 2021-11-17 | End: 2021-11-18

## 2021-11-17 RX ORDER — POTASSIUM CHLORIDE 20 MEQ
10 PACKET (EA) ORAL ONCE
Refills: 0 | Status: COMPLETED | OUTPATIENT
Start: 2021-11-17 | End: 2021-11-17

## 2021-11-17 RX ORDER — KETOROLAC TROMETHAMINE 30 MG/ML
30 SYRINGE (ML) INJECTION ONCE
Refills: 0 | Status: DISCONTINUED | OUTPATIENT
Start: 2021-11-17 | End: 2021-11-17

## 2021-11-17 RX ORDER — PHENOBARBITAL 60 MG
130 TABLET ORAL
Refills: 0 | Status: DISCONTINUED | OUTPATIENT
Start: 2021-11-17 | End: 2021-11-17

## 2021-11-17 RX ORDER — SODIUM CHLORIDE 9 MG/ML
2000 INJECTION, SOLUTION INTRAVENOUS
Refills: 0 | Status: COMPLETED | OUTPATIENT
Start: 2021-11-17 | End: 2021-11-17

## 2021-11-17 RX ORDER — INFLUENZA VIRUS VACCINE 15; 15; 15; 15 UG/.5ML; UG/.5ML; UG/.5ML; UG/.5ML
0.5 SUSPENSION INTRAMUSCULAR ONCE
Refills: 0 | Status: DISCONTINUED | OUTPATIENT
Start: 2021-11-17 | End: 2021-11-18

## 2021-11-17 RX ORDER — FOLIC ACID 0.8 MG
1 TABLET ORAL DAILY
Refills: 0 | Status: DISCONTINUED | OUTPATIENT
Start: 2021-11-17 | End: 2021-11-18

## 2021-11-17 RX ORDER — SODIUM CHLORIDE 9 MG/ML
1000 INJECTION INTRAMUSCULAR; INTRAVENOUS; SUBCUTANEOUS ONCE
Refills: 0 | Status: COMPLETED | OUTPATIENT
Start: 2021-11-17 | End: 2021-11-17

## 2021-11-17 RX ORDER — ACETAMINOPHEN 500 MG
650 TABLET ORAL EVERY 6 HOURS
Refills: 0 | Status: DISCONTINUED | OUTPATIENT
Start: 2021-11-17 | End: 2021-11-18

## 2021-11-17 RX ORDER — HEPARIN SODIUM 5000 [USP'U]/ML
5000 INJECTION INTRAVENOUS; SUBCUTANEOUS EVERY 12 HOURS
Refills: 0 | Status: DISCONTINUED | OUTPATIENT
Start: 2021-11-17 | End: 2021-11-18

## 2021-11-17 RX ORDER — ONDANSETRON 8 MG/1
4 TABLET, FILM COATED ORAL ONCE
Refills: 0 | Status: COMPLETED | OUTPATIENT
Start: 2021-11-17 | End: 2021-11-17

## 2021-11-17 RX ORDER — ASPIRIN/CALCIUM CARB/MAGNESIUM 324 MG
81 TABLET ORAL DAILY
Refills: 0 | Status: DISCONTINUED | OUTPATIENT
Start: 2021-11-17 | End: 2021-11-18

## 2021-11-17 RX ADMIN — Medication 30 MILLIGRAM(S): at 07:50

## 2021-11-17 RX ADMIN — ONDANSETRON 4 MILLIGRAM(S): 8 TABLET, FILM COATED ORAL at 04:34

## 2021-11-17 RX ADMIN — Medication 81 MILLIGRAM(S): at 11:39

## 2021-11-17 RX ADMIN — Medication 2 MILLIGRAM(S): at 05:36

## 2021-11-17 RX ADMIN — Medication 1 TABLET(S): at 04:37

## 2021-11-17 RX ADMIN — SODIUM CHLORIDE 100 MILLILITER(S): 9 INJECTION, SOLUTION INTRAVENOUS at 07:07

## 2021-11-17 RX ADMIN — Medication 4 MILLIGRAM(S): at 21:11

## 2021-11-17 RX ADMIN — Medication 30 MILLIGRAM(S): at 07:37

## 2021-11-17 RX ADMIN — PANTOPRAZOLE SODIUM 40 MILLIGRAM(S): 20 TABLET, DELAYED RELEASE ORAL at 17:07

## 2021-11-17 RX ADMIN — Medication 100 MILLIEQUIVALENT(S): at 06:04

## 2021-11-17 RX ADMIN — Medication 4 MILLIGRAM(S): at 09:53

## 2021-11-17 RX ADMIN — Medication 40 MILLIEQUIVALENT(S): at 13:47

## 2021-11-17 RX ADMIN — Medication 4 MILLIGRAM(S): at 13:47

## 2021-11-17 RX ADMIN — SODIUM CHLORIDE 1000 MILLILITER(S): 9 INJECTION INTRAMUSCULAR; INTRAVENOUS; SUBCUTANEOUS at 04:15

## 2021-11-17 RX ADMIN — ATORVASTATIN CALCIUM 20 MILLIGRAM(S): 80 TABLET, FILM COATED ORAL at 21:11

## 2021-11-17 RX ADMIN — Medication 1 MILLIGRAM(S): at 04:37

## 2021-11-17 RX ADMIN — SODIUM CHLORIDE 1000 MILLILITER(S): 9 INJECTION INTRAMUSCULAR; INTRAVENOUS; SUBCUTANEOUS at 05:18

## 2021-11-17 RX ADMIN — PANTOPRAZOLE SODIUM 40 MILLIGRAM(S): 20 TABLET, DELAYED RELEASE ORAL at 06:31

## 2021-11-17 RX ADMIN — Medication 100 MILLIGRAM(S): at 04:37

## 2021-11-17 RX ADMIN — Medication 2 MILLIGRAM(S): at 04:14

## 2021-11-17 NOTE — ED PROVIDER NOTE - CLINICAL SUMMARY MEDICAL DECISION MAKING FREE TEXT BOX
frequent visitor to [facility] ED for etoh withdrawal. Todays presentation c/w repeat withdrawal episode. No focal abdominal tenderness. Pt placed on CIWA with prn ativan orders. lactate 11+, will admit pt to hospital.

## 2021-11-17 NOTE — ED ADULT TRIAGE NOTE - CHIEF COMPLAINT QUOTE
c/o abdominal pain, N/V X2 today, refusing to answer question in triage, pt appears restless in triage.

## 2021-11-17 NOTE — H&P ADULT - HISTORY OF PRESENT ILLNESS
Patient is a 56M with a PMH of chronic ETOH abuse with hx of alcohol withdrawal and DTs with frequent hospital admissions, alcoholic gastritis/esophagitis, PUD, HLD, hx of hypoxic respiratory failure requiring intubation due to aspiration PNA who presents to the ED for abdominal pain and vomiting.  Patient states that he has had severe vomiting and pain for the last two days, unable to tolerate PO intake.  Patient insists that he has not had alcohol to drink, instead has only been drinking water and juice.  Patient has no other complaints.  Tachycardic in ED to the 130s, labs show significant lactic acidosis.  Will admit to tele.

## 2021-11-17 NOTE — ED PROVIDER NOTE - OBJECTIVE STATEMENT
56M pmhx etoh abuse, pt is a known daily drinker and a frequent visitor to this ED. On exam pt is repeatedly yelling "too much pain doctor" and is unwilling to elaborate further. Vomiting on exam. Pt denies fever/chills, recent etoh use and denies illicit drug use.

## 2021-11-17 NOTE — SBIRT NOTE ADULT - NSSBIRTBRIEFINTDET_GEN_A_CORE
Provided SBIRT services: Full screen positive. Pt known to ED. Brief Intervention Performed. Screening results were reviewed with the patient and patient was provided information about healthy guidelines and potential negative consequences associated with level of risk. Motivation and readiness to reduce or stop use was discussed and goals and activities to make changes were suggested/offered. Patient insists he doesn't drink much and that it isn't an issue. Tried to discuss chronic abdominal pain and alcohol, but pt repeatedly stated it is not related and that he is in pain. Not receptive to change.

## 2021-11-17 NOTE — H&P ADULT - PROBLEM SELECTOR PLAN 1
CECILIA currently 2, recently received ativan  Will continue withdrawal protocol with Ativan 4 mg IVP standing  Phenobarbital PRN for acute withdrawal symptoms (patient extremely high risk for acute withdrawal/ DTs)  Thiamine, Folate, MVI daily

## 2021-11-17 NOTE — ED ADULT NURSE NOTE - NSIMPLEMENTINTERV_GEN_ALL_ED
Implemented All Fall with Harm Risk Interventions:  [place] to call system. Call bell, personal items and telephone within reach. Instruct patient to call for assistance. Room bathroom lighting operational. Non-slip footwear when patient is off stretcher. Physically safe environment: no spills, clutter or unnecessary equipment. Stretcher in lowest position, wheels locked, appropriate side rails in place. Provide visual cue, wrist band, yellow gown, etc. Monitor gait and stability. Monitor for mental status changes and reorient to person, place, and time. Review medications for side effects contributing to fall risk. Reinforce activity limits and safety measures with patient and family. Provide visual clues: red socks.

## 2021-11-17 NOTE — ED ADULT NURSE NOTE - NSFALLRSKINDICATORS_ED_ALL_ED
yes Doxepin Pregnancy And Lactation Text: This medication is Pregnancy Category C and it isn't known if it is safe during pregnancy. It is also excreted in breast milk and breast feeding isn't recommended.

## 2021-11-17 NOTE — H&P ADULT - ASSESSMENT
Patient is a 56M with a PMH of chronic ETOH abuse with hx of alcohol withdrawal and DTs with frequent hospital admissions, alcoholic gastritis/esophagitis, PUD, HLD, hx of hypoxic respiratory failure requiring intubation due to aspiration PNA who presents to the ED for abdominal pain and vomiting.  Patient states that he has had severe vomiting and pain for the last two days, unable to tolerate PO intake.  Patient insists that he has not had alcohol to drink, instead has only been drinking water and juice.  Patient has no other complaints.  Tachycardic in ED to the 130s, labs show significant lactic acidosis.  Will admit to tele.     IMPROVE VTE Individual Risk Assessment          RISK                                                          Points  [  ] Previous VTE                                                3  [  ] Thrombophilia                                             2  [  ] Lower limb paralysis                                    2        (unable to hold up >15 seconds)    [  ] Current Cancer                                             2         (within 6 months)  [  ] Immobilization > 24 hrs                              1  [  ] ICU/CCU stay > 24 hours                            1  [  ] Age > 60                                                    1    IMPROVE VTE Score - 1

## 2021-11-17 NOTE — PATIENT PROFILE ADULT - NSPRESCRALCFREQ_GEN_A_NUR
4 or more times a week [Fatigue] : no fatigue [Recent Weight Gain (___ Lbs)] : no recent weight gain [Recent Weight Loss (___ Lbs)] : no recent weight loss [Blurred Vision] : blurred vision [Nausea] : no nausea [Constipation] : constipation [Vomiting] : no vomiting [Diarrhea] : diarrhea [Polyuria] : no polyuria [Headaches] : no headaches [Dizziness] : no dizziness [Pain/Numbness of Digits] : no pain/numbness of digits [Polydipsia] : no polydipsia [Cold Intolerance] : cold intolerance [Negative] : Heme/Lymph

## 2021-11-17 NOTE — H&P ADULT - NSHPLABSRESULTS_GEN_ALL_CORE
Recent Vitals  T(C): 37.1 (11-17-21 @ 03:01), Max: 37.1 (11-17-21 @ 03:01)  HR: 113 (11-17-21 @ 05:09) (113 - 136)  BP: 131/85 (11-17-21 @ 05:09) (131/85 - 134/84)  RR: 13 (11-17-21 @ 05:09) (13 - 20)  SpO2: 97% (11-17-21 @ 05:09) (97% - 98%)                        12.6   10.29 )-----------( 379      ( 17 Nov 2021 04:30 )             40.4     11-17    145  |  108  |  20  ----------------------------<  112<H>  3.3<L>   |  16<L>  |  1.32<H>    Ca    9.3      17 Nov 2021 04:30  Phos  2.8     11-17  Mg     2.0     11-17    TPro  8.9<H>  /  Alb  4.1  /  TBili  0.4  /  DBili  x   /  AST  26  /  ALT  24  /  AlkPhos  56  11-17    PT/INR - ( 17 Nov 2021 04:30 )   PT: 12.1 sec;   INR: 1.05 ratio         PTT - ( 17 Nov 2021 04:30 )  PTT:25.1 sec  LIVER FUNCTIONS - ( 17 Nov 2021 04:30 )  Alb: 4.1 g/dL / Pro: 8.9 gm/dL / ALK PHOS: 56 U/L / ALT: 24 U/L / AST: 26 U/L / GGT: x               Home Medications:  acetaminophen 325 mg oral tablet: 2 tab(s) orally every 6 hours, As needed,  (18 Sep 2021 02:34)  aspirin 81 mg oral delayed release tablet: 1 tab(s) orally once a day (18 Sep 2021 02:34)

## 2021-11-17 NOTE — ED PROVIDER NOTE - PHYSICAL EXAMINATION
Constitutional: Discheveled, uncomfortable appearing. Frequent yelling.  ENMT: Airway patent.  Eyes: Clear bilaterally, pupils equal, round and reactive to light.  Cardiac: Tachycardic, regular rhythm.  Respiratory: Breath sounds clear and equal bilaterally.  Gastrointestinal: Abdomen soft, non-tender, no guarding.  Neurological: No focal deficits.   Skin: No evidence of rash.

## 2021-11-18 ENCOUNTER — TRANSCRIPTION ENCOUNTER (OUTPATIENT)
Age: 56
End: 2021-11-18

## 2021-11-18 VITALS
DIASTOLIC BLOOD PRESSURE: 83 MMHG | TEMPERATURE: 98 F | HEART RATE: 70 BPM | RESPIRATION RATE: 18 BRPM | OXYGEN SATURATION: 97 % | SYSTOLIC BLOOD PRESSURE: 140 MMHG

## 2021-11-18 LAB
ANION GAP SERPL CALC-SCNC: 6 MMOL/L — SIGNIFICANT CHANGE UP (ref 5–17)
BUN SERPL-MCNC: 19 MG/DL — SIGNIFICANT CHANGE UP (ref 7–23)
CALCIUM SERPL-MCNC: 8.6 MG/DL — SIGNIFICANT CHANGE UP (ref 8.5–10.1)
CHLORIDE SERPL-SCNC: 110 MMOL/L — HIGH (ref 96–108)
CO2 SERPL-SCNC: 28 MMOL/L — SIGNIFICANT CHANGE UP (ref 22–31)
COVID-19 NUCLEOCAPSID GAM AB INTERP: POSITIVE
COVID-19 NUCLEOCAPSID TOTAL GAM ANTIBODY RESULT: 10.3 INDEX — HIGH
COVID-19 SPIKE DOMAIN AB INTERP: POSITIVE
COVID-19 SPIKE DOMAIN ANTIBODY RESULT: >250 U/ML — HIGH
CREAT SERPL-MCNC: 1.22 MG/DL — SIGNIFICANT CHANGE UP (ref 0.5–1.3)
GLUCOSE SERPL-MCNC: 104 MG/DL — HIGH (ref 70–99)
HCT VFR BLD CALC: 34.7 % — LOW (ref 39–50)
HGB BLD-MCNC: 10.9 G/DL — LOW (ref 13–17)
MCHC RBC-ENTMCNC: 23.3 PG — LOW (ref 27–34)
MCHC RBC-ENTMCNC: 31.4 GM/DL — LOW (ref 32–36)
MCV RBC AUTO: 74.1 FL — LOW (ref 80–100)
NRBC # BLD: 0 /100 WBCS — SIGNIFICANT CHANGE UP (ref 0–0)
PLATELET # BLD AUTO: 276 K/UL — SIGNIFICANT CHANGE UP (ref 150–400)
POTASSIUM SERPL-MCNC: 3.9 MMOL/L — SIGNIFICANT CHANGE UP (ref 3.5–5.3)
POTASSIUM SERPL-SCNC: 3.9 MMOL/L — SIGNIFICANT CHANGE UP (ref 3.5–5.3)
RBC # BLD: 4.68 M/UL — SIGNIFICANT CHANGE UP (ref 4.2–5.8)
RBC # FLD: 20.5 % — HIGH (ref 10.3–14.5)
SARS-COV-2 IGG+IGM SERPL QL IA: 10.3 INDEX — HIGH
SARS-COV-2 IGG+IGM SERPL QL IA: >250 U/ML — HIGH
SARS-COV-2 IGG+IGM SERPL QL IA: POSITIVE
SARS-COV-2 IGG+IGM SERPL QL IA: POSITIVE
SODIUM SERPL-SCNC: 144 MMOL/L — SIGNIFICANT CHANGE UP (ref 135–145)
WBC # BLD: 3.24 K/UL — LOW (ref 3.8–10.5)
WBC # FLD AUTO: 3.24 K/UL — LOW (ref 3.8–10.5)

## 2021-11-18 PROCEDURE — 99233 SBSQ HOSP IP/OBS HIGH 50: CPT

## 2021-11-18 RX ORDER — PANTOPRAZOLE SODIUM 20 MG/1
40 TABLET, DELAYED RELEASE ORAL EVERY 12 HOURS
Refills: 0 | Status: DISCONTINUED | OUTPATIENT
Start: 2021-11-18 | End: 2021-11-18

## 2021-11-18 RX ORDER — ACETAMINOPHEN 500 MG
650 TABLET ORAL EVERY 6 HOURS
Refills: 0 | Status: DISCONTINUED | OUTPATIENT
Start: 2021-11-18 | End: 2021-11-18

## 2021-11-18 RX ADMIN — Medication 1 MILLIGRAM(S): at 11:52

## 2021-11-18 RX ADMIN — HEPARIN SODIUM 5000 UNIT(S): 5000 INJECTION INTRAVENOUS; SUBCUTANEOUS at 05:45

## 2021-11-18 RX ADMIN — Medication 650 MILLIGRAM(S): at 00:28

## 2021-11-18 RX ADMIN — Medication 650 MILLIGRAM(S): at 01:15

## 2021-11-18 RX ADMIN — Medication 2 MILLIGRAM(S): at 14:03

## 2021-11-18 RX ADMIN — Medication 81 MILLIGRAM(S): at 11:51

## 2021-11-18 RX ADMIN — PANTOPRAZOLE SODIUM 40 MILLIGRAM(S): 20 TABLET, DELAYED RELEASE ORAL at 17:49

## 2021-11-18 RX ADMIN — HEPARIN SODIUM 5000 UNIT(S): 5000 INJECTION INTRAVENOUS; SUBCUTANEOUS at 17:33

## 2021-11-18 RX ADMIN — PANTOPRAZOLE SODIUM 40 MILLIGRAM(S): 20 TABLET, DELAYED RELEASE ORAL at 17:33

## 2021-11-18 RX ADMIN — PANTOPRAZOLE SODIUM 40 MILLIGRAM(S): 20 TABLET, DELAYED RELEASE ORAL at 05:45

## 2021-11-18 RX ADMIN — Medication 100 MILLIGRAM(S): at 11:52

## 2021-11-18 RX ADMIN — Medication 650 MILLIGRAM(S): at 09:40

## 2021-11-18 RX ADMIN — Medication 4 MILLIGRAM(S): at 01:34

## 2021-11-18 RX ADMIN — Medication 4 MILLIGRAM(S): at 08:25

## 2021-11-18 RX ADMIN — Medication 650 MILLIGRAM(S): at 08:33

## 2021-11-18 RX ADMIN — Medication 650 MILLIGRAM(S): at 17:30

## 2021-11-18 RX ADMIN — Medication 1 TABLET(S): at 11:51

## 2021-11-18 RX ADMIN — Medication 650 MILLIGRAM(S): at 16:52

## 2021-11-18 NOTE — CHART NOTE - NSCHARTNOTEFT_GEN_A_CORE
Patient is a 56y old  Male who presents with a chief complaint of EtOH withdrawal (18 Nov 2021 21:09)    Patient is a 56M with a PMH of chronic ETOH abuse with hx of alcohol withdrawal and DTs with frequent hospital admissions, alcoholic gastritis/esophagitis, PUD, HLD, hx of hypoxic respiratory failure requiring intubation due to aspiration PNA who presents to the ED for abdominal pain and vomiting.  Patient states that he has had severe vomiting and pain for the last two days, unable to tolerate PO intake.  Patient insists that he has not had alcohol to drink, instead has only been drinking water and juice.  Patient has no other complaints.  Tachycardic in ED to the 130s, labs show significant lactic acidosis.  Admitted to telemetry    Subjective/Observations: Pt. seen and examined and evaluated. Pt. sitting comfortably at bedside in bed in NAD, with no respiratory distress, no chest pain, dyspnea, palpitations, PND, or orthopnea. Son at bedside .     Called by RN to facilitate patient requesting to sign out AMA   Pt. seen and evaluated at bedside in now acute distress CIWA score 3 son at bedside         T(C): 36.5 (18 Nov 2021 16:35), Max: 37.1 (17 Nov 2021 23:21)  T(F): 97.7 (18 Nov 2021 16:35), Max: 98.8 (17 Nov 2021 23:21)  HR: 70 (18 Nov 2021 16:35) (70 - 93)  BP: 140/83 (18 Nov 2021 16:35) (117/73 - 140/83)  BP(mean): --  RR: 18 (18 Nov 2021 16:35) (16 - 18)  SpO2: 97% (18 Nov 2021 16:35) (95% - 99%)      PT/INR - ( 17 Nov 2021 04:30 )   PT: 12.1 sec;   INR: 1.05 ratio         PTT - ( 17 Nov 2021 04:30 )  PTT:25.1 sec                        10.9   3.24  )-----------( 276      ( 18 Nov 2021 08:37 )             34.7     11-18    144  |  110<H>  |  19  ----------------------------<  104<H>  3.9   |  28  |  1.22    Ca    8.6      18 Nov 2021 08:37  Phos  2.8     11-17  Mg     2.0     11-17    TPro  8.9<H>  /  Alb  4.1  /  TBili  0.4  /  DBili  x   /  AST  26  /  ALT  24  /  AlkPhos  56  11-17    LIVER FUNCTIONS - ( 17 Nov 2021 04:30 )  Alb: 4.1 g/dL / Pro: 8.9 gm/dL / ALK PHOS: 56 U/L / ALT: 24 U/L / AST: 26 U/L / GGT: x                                   CAPILLARY BLOOD GLUCOSE       56M with a PMH of chronic ETOH abuse with hx of alcohol withdrawal and DTs with frequent hospital admissions, alcoholic gastritis/esophagitis, PUD, HLD, hx of hypoxic respiratory failure requiring intubation due to aspiration PNA who presents to the ED for abdominal pain and vomiting.  Pt. mow requesting to sign out AMA CIWA score 3; son at bedside     Pt. signed out AMA discharged with son Yes Detail Level: Detailed

## 2021-11-18 NOTE — DISCHARGE NOTE PROVIDER - NSDCCPCAREPLAN_GEN_ALL_CORE_FT
PRINCIPAL DISCHARGE DIAGNOSIS  Diagnosis: Alcohol withdrawal  Assessment and Plan of Treatment:

## 2021-11-18 NOTE — DISCHARGE NOTE PROVIDER - CARE PROVIDERS DIRECT ADDRESSES
LM on VM as a reminder for pt to check my chart message to fill out questionnaires.    Martha KONG, ALLISON,DEANGELO     ,DirectAddress_Unknown

## 2021-11-18 NOTE — DISCHARGE NOTE PROVIDER - HOSPITAL COURSE
HPI:  Patient is a 56M with a PMH of chronic ETOH abuse with hx of alcohol withdrawal and DTs with frequent hospital admissions, alcoholic gastritis/esophagitis, PUD, HLD, hx of hypoxic respiratory failure requiring intubation due to aspiration PNA who presents to the ED for abdominal pain and vomiting.  Patient states that he has had severe vomiting and pain for the last two days, unable to tolerate PO intake.  Patient insists that he has not had alcohol to drink, instead has only been drinking water and juice.  Patient has no other complaints.  Tachycardic in ED to the 130s, labs show significant lactic acidosis.  Admitted to telemetry    Subjective/Observations: Pt. seen and examined and evaluated. Pt. sitting comfortably at bedside in bed in NAD, with no respiratory distress, no chest pain, dyspnea, palpitations, PND, or orthopnea. Son at bedside .   Pt. now requesting to sign out AMA CIWA score 3    REVIEW OF SYSTEMS: All other review of systems is negative unless indicated above    PAST MEDICAL & SURGICAL HISTORY:  PUD (peptic ulcer disease)    Mixed hyperlipidemia    EtOH dependence    Gastritis    Substance abuse    DTs (delirium tremens)    No significant past surgical history    MEDICATIONS  (STANDING):  aspirin enteric coated 81 milliGRAM(s) Oral daily  atorvastatin 20 milliGRAM(s) Oral at bedtime  folic acid 1 milliGRAM(s) Oral daily  heparin   Injectable 5000 Unit(s) SubCutaneous every 12 hours  influenza   Vaccine 0.5 milliLiter(s) IntraMuscular once  LORazepam     Tablet 2 milliGRAM(s) Oral three times a day  multivitamin 1 Tablet(s) Oral daily  pantoprazole    Tablet 40 milliGRAM(s) Oral every 12 hours  thiamine 100 milliGRAM(s) Oral daily    MEDICATIONS  (PRN):  acetaminophen     Tablet .. 650 milliGRAM(s) Oral every 6 hours PRN Temp greater or equal to 38C (100.4F), Mild Pain (1 - 3)  PHENobarbital Injectable 130 milliGRAM(s) IV Push every 20 minutes PRN For Alcohol Withdrawl      Allergies: No Known Allergies    Vital Signs Last 24 Hrs  T(C): 36.5 (18 Nov 2021 16:35), Max: 37.1 (17 Nov 2021 23:21)  T(F): 97.7 (18 Nov 2021 16:35), Max: 98.8 (17 Nov 2021 23:21)  HR: 70 (18 Nov 2021 16:35) (70 - 93)  BP: 140/83 (18 Nov 2021 16:35) (117/73 - 140/83)  BP(mean): --  RR: 18 (18 Nov 2021 16:35) (16 - 18)  SpO2: 97% (18 Nov 2021 16:35) (95% - 99%)    I&O's Summary    17 Nov 2021 07:01  -  18 Nov 2021 07:00  --------------------------------------------------------  IN: 0 mL / OUT: 1050 mL / NET: -1050 mL    18 Nov 2021 07:01  -  18 Nov 2021 21:10  --------------------------------------------------------  IN: 450 mL / OUT: 0 mL / NET: 450 mL              LABS: All Labs Reviewed:                        10.9   3.24  )-----------( 276      ( 18 Nov 2021 08:37 )             34.7              11-18    144  |  110<H>  |  19  ----------------------------<  104<H>  3.9   |  28  |  1.22    Ca    8.6      18 Nov 2021 08:37  Phos  2.8     11-17  Mg     2.0     11-17    TPro  8.9<H>  /  Alb  4.1  /  TBili  0.4  /  DBili  x   /  AST  26  /  ALT  24  /  AlkPhos  56  11-17

## 2021-11-18 NOTE — PROGRESS NOTE ADULT - SUBJECTIVE AND OBJECTIVE BOX
Patient is a 56y old  Male who presents with a chief complaint of EtOH withdrawal (17 Nov 2021 05:58)       INTERVAL HPI/OVERNIGHT EVENTS: CIWA 8 this AM. feeling better.       REVIEW OF SYSTEMS:   Remaining ROS negative    Home Medications:  acetaminophen 325 mg oral tablet: 2 tab(s) orally every 6 hours, As needed,  (18 Sep 2021 02:34)  aspirin 81 mg oral delayed release tablet: 1 tab(s) orally once a day (18 Sep 2021 02:34)        MEDICATIONS  (STANDING):  aspirin enteric coated 81 milliGRAM(s) Oral daily  atorvastatin 20 milliGRAM(s) Oral at bedtime  folic acid 1 milliGRAM(s) Oral daily  heparin   Injectable 5000 Unit(s) SubCutaneous every 12 hours  influenza   Vaccine 0.5 milliLiter(s) IntraMuscular once  LORazepam     Tablet 2 milliGRAM(s) Oral three times a day  multivitamin 1 Tablet(s) Oral daily  pantoprazole  Injectable 40 milliGRAM(s) IV Push two times a day  thiamine 100 milliGRAM(s) Oral daily    MEDICATIONS  (PRN):  acetaminophen     Tablet .. 650 milliGRAM(s) Oral every 6 hours PRN Temp greater or equal to 38C (100.4F), Mild Pain (1 - 3)  PHENobarbital Injectable 130 milliGRAM(s) IV Push every 20 minutes PRN For Alcohol Withdrawl      Allergies    No Known Allergies    Intolerances        Vital Signs Last 24 Hrs  T(C): 36.8 (18 Nov 2021 04:46), Max: 37.2 (17 Nov 2021 11:32)  T(F): 98.2 (18 Nov 2021 04:46), Max: 98.9 (17 Nov 2021 11:32)  HR: 81 (18 Nov 2021 04:46) (79 - 105)  BP: 134/83 (18 Nov 2021 04:46) (119/80 - 134/85)  BP(mean): --  RR: 16 (18 Nov 2021 04:46) (16 - 18)  SpO2: 99% (18 Nov 2021 04:46) (97% - 99%)    PHYSICAL EXAM:  GENERAL: NAD  HEAD:  Atraumatic, Normocephalic  EYES: EOMI, PERRLA, conjunctiva and sclera clear  ENT: O/P Clear  NECK: Supple, No JVD  NERVOUS SYSTEM:  No focal deficits  CHEST/LUNG: Clear to percussion bilaterally; No rales, rhonchi, wheezing  HEART: Regular rate and rhythm; No murmurs, rubs, or gallops  ABDOMEN: Soft, Nontender, Nondistended; Bowel sounds present  EXTREMITIES:  2+ Peripheral Pulses, No clubbing, cyanosis, or edema  SKIN: No rashes or lesions    LABS:                        10.9   3.24  )-----------( 276      ( 18 Nov 2021 08:37 )             34.7     11-18    144  |  110<H>  |  19  ----------------------------<  104<H>  3.9   |  28  |  1.22    Ca    8.6      18 Nov 2021 08:37  Phos  2.8     11-17  Mg     2.0     11-17    TPro  8.9<H>  /  Alb  4.1  /  TBili  0.4  /  DBili  x   /  AST  26  /  ALT  24  /  AlkPhos  56  11-17    PT/INR - ( 17 Nov 2021 04:30 )   PT: 12.1 sec;   INR: 1.05 ratio         PTT - ( 17 Nov 2021 04:30 )  PTT:25.1 sec    CAPILLARY BLOOD GLUCOSE          RADIOLOGY & ADDITIONAL TESTS:    Imaging Personally Reviewed:  [ ] YES  [ ] NO    Consultant(s) Notes Reviewed:  [ ] YES  [ ] NO    Care Discussed with Consultants/Other Providers [ ] YES  [ ] NO

## 2021-11-18 NOTE — PROGRESS NOTE ADULT - PROBLEM SELECTOR PLAN 1
TIKAWA currently 8  Will continue withdrawal protocol with Ativan 2 mg PO TID standing  Phenobarbital PRN for acute withdrawal symptoms (patient extremely high risk for acute withdrawal/ DTs)  Thiamine, Folate, MVI daily

## 2021-11-23 DIAGNOSIS — F10.239 ALCOHOL DEPENDENCE WITH WITHDRAWAL, UNSPECIFIED: ICD-10-CM

## 2021-11-23 DIAGNOSIS — E87.6 HYPOKALEMIA: ICD-10-CM

## 2021-11-23 DIAGNOSIS — E78.2 MIXED HYPERLIPIDEMIA: ICD-10-CM

## 2021-11-23 DIAGNOSIS — K29.20 ALCOHOLIC GASTRITIS WITHOUT BLEEDING: ICD-10-CM

## 2021-11-23 DIAGNOSIS — E87.2 ACIDOSIS: ICD-10-CM

## 2021-11-25 ENCOUNTER — EMERGENCY (EMERGENCY)
Facility: HOSPITAL | Age: 56
LOS: 0 days | Discharge: HOME | End: 2021-11-26
Attending: EMERGENCY MEDICINE
Payer: MEDICAID

## 2021-11-25 VITALS
WEIGHT: 190.04 LBS | SYSTOLIC BLOOD PRESSURE: 137 MMHG | DIASTOLIC BLOOD PRESSURE: 91 MMHG | TEMPERATURE: 98 F | OXYGEN SATURATION: 95 % | HEART RATE: 102 BPM | HEIGHT: 67 IN | RESPIRATION RATE: 16 BRPM
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
## 2021-11-25 DIAGNOSIS — R11.2 NAUSEA WITH VOMITING, UNSPECIFIED: ICD-10-CM

## 2021-11-25 DIAGNOSIS — E78.5 HYPERLIPIDEMIA, UNSPECIFIED: ICD-10-CM

## 2021-11-25 DIAGNOSIS — R10.9 UNSPECIFIED ABDOMINAL PAIN: ICD-10-CM

## 2021-11-25 DIAGNOSIS — K29.20 ALCOHOLIC GASTRITIS WITHOUT BLEEDING: ICD-10-CM

## 2021-11-25 DIAGNOSIS — F10.10 ALCOHOL ABUSE, UNCOMPLICATED: ICD-10-CM

## 2021-11-25 DIAGNOSIS — Z79.82 LONG TERM (CURRENT) USE OF ASPIRIN: ICD-10-CM

## 2021-11-25 DIAGNOSIS — R11.10 VOMITING, UNSPECIFIED: ICD-10-CM

## 2021-11-25 DIAGNOSIS — Z87.898 PERSONAL HISTORY OF OTHER SPECIFIED CONDITIONS: ICD-10-CM

## 2021-11-25 LAB
ALBUMIN SERPL ELPH-MCNC: 3.5 G/DL — SIGNIFICANT CHANGE UP (ref 3.3–5)
ALBUMIN SERPL ELPH-MCNC: 3.9 G/DL — SIGNIFICANT CHANGE UP (ref 3.3–5)
ALP SERPL-CCNC: 50 U/L — SIGNIFICANT CHANGE UP (ref 40–120)
ALP SERPL-CCNC: 55 U/L — SIGNIFICANT CHANGE UP (ref 40–120)
ALT FLD-CCNC: 17 U/L — SIGNIFICANT CHANGE UP (ref 12–78)
ALT FLD-CCNC: 19 U/L — SIGNIFICANT CHANGE UP (ref 12–78)
ANION GAP SERPL CALC-SCNC: 15 MMOL/L — SIGNIFICANT CHANGE UP (ref 5–17)
ANION GAP SERPL CALC-SCNC: 19 MMOL/L — HIGH (ref 5–17)
ANISOCYTOSIS BLD QL: SLIGHT — SIGNIFICANT CHANGE UP
AST SERPL-CCNC: 20 U/L — SIGNIFICANT CHANGE UP (ref 15–37)
AST SERPL-CCNC: 20 U/L — SIGNIFICANT CHANGE UP (ref 15–37)
BASOPHILS # BLD AUTO: 0.08 K/UL — SIGNIFICANT CHANGE UP (ref 0–0.2)
BASOPHILS NFR BLD AUTO: 1 % — SIGNIFICANT CHANGE UP (ref 0–2)
BILIRUB SERPL-MCNC: 0.3 MG/DL — SIGNIFICANT CHANGE UP (ref 0.2–1.2)
BILIRUB SERPL-MCNC: 0.3 MG/DL — SIGNIFICANT CHANGE UP (ref 0.2–1.2)
BUN SERPL-MCNC: 17 MG/DL — SIGNIFICANT CHANGE UP (ref 7–23)
BUN SERPL-MCNC: 19 MG/DL — SIGNIFICANT CHANGE UP (ref 7–23)
CALCIUM SERPL-MCNC: 8.7 MG/DL — SIGNIFICANT CHANGE UP (ref 8.5–10.1)
CALCIUM SERPL-MCNC: 8.9 MG/DL — SIGNIFICANT CHANGE UP (ref 8.5–10.1)
CHLORIDE SERPL-SCNC: 104 MMOL/L — SIGNIFICANT CHANGE UP (ref 96–108)
CHLORIDE SERPL-SCNC: 105 MMOL/L — SIGNIFICANT CHANGE UP (ref 96–108)
CO2 SERPL-SCNC: 21 MMOL/L — LOW (ref 22–31)
CO2 SERPL-SCNC: 23 MMOL/L — SIGNIFICANT CHANGE UP (ref 22–31)
CREAT SERPL-MCNC: 0.99 MG/DL — SIGNIFICANT CHANGE UP (ref 0.5–1.3)
CREAT SERPL-MCNC: 1.13 MG/DL — SIGNIFICANT CHANGE UP (ref 0.5–1.3)
ELLIPTOCYTES BLD QL SMEAR: SLIGHT — SIGNIFICANT CHANGE UP
EOSINOPHIL # BLD AUTO: 0.26 K/UL — SIGNIFICANT CHANGE UP (ref 0–0.5)
EOSINOPHIL NFR BLD AUTO: 3.3 % — SIGNIFICANT CHANGE UP (ref 0–6)
ETHANOL SERPL-MCNC: 261 MG/DL — HIGH (ref 0–10)
GLUCOSE SERPL-MCNC: 89 MG/DL — SIGNIFICANT CHANGE UP (ref 70–99)
GLUCOSE SERPL-MCNC: 94 MG/DL — SIGNIFICANT CHANGE UP (ref 70–99)
HCT VFR BLD CALC: 45.5 % — SIGNIFICANT CHANGE UP (ref 39–50)
HGB BLD-MCNC: 14.1 G/DL — SIGNIFICANT CHANGE UP (ref 13–17)
HYPOCHROMIA BLD QL: SLIGHT — SIGNIFICANT CHANGE UP
IMM GRANULOCYTES NFR BLD AUTO: 0.3 % — SIGNIFICANT CHANGE UP (ref 0–1.5)
LYMPHOCYTES # BLD AUTO: 3.78 K/UL — HIGH (ref 1–3.3)
LYMPHOCYTES # BLD AUTO: 48.3 % — HIGH (ref 13–44)
MAGNESIUM SERPL-MCNC: 2.2 MG/DL — SIGNIFICANT CHANGE UP (ref 1.6–2.6)
MANUAL SMEAR VERIFICATION: YES — SIGNIFICANT CHANGE UP
MCHC RBC-ENTMCNC: 22.8 PG — LOW (ref 27–34)
MCHC RBC-ENTMCNC: 31 GM/DL — LOW (ref 32–36)
MCV RBC AUTO: 73.5 FL — LOW (ref 80–100)
MICROCYTES BLD QL: SLIGHT — SIGNIFICANT CHANGE UP
MONOCYTES # BLD AUTO: 0.43 K/UL — SIGNIFICANT CHANGE UP (ref 0–0.9)
MONOCYTES NFR BLD AUTO: 5.5 % — SIGNIFICANT CHANGE UP (ref 2–14)
NEUTROPHILS # BLD AUTO: 3.26 K/UL — SIGNIFICANT CHANGE UP (ref 1.8–7.4)
NEUTROPHILS NFR BLD AUTO: 41.6 % — LOW (ref 43–77)
NRBC # BLD: 0 /100 WBCS — SIGNIFICANT CHANGE UP (ref 0–0)
OVALOCYTES BLD QL SMEAR: SLIGHT — SIGNIFICANT CHANGE UP
PHOSPHATE SERPL-MCNC: 3.7 MG/DL — SIGNIFICANT CHANGE UP (ref 2.5–4.5)
PLAT MORPH BLD: NORMAL — SIGNIFICANT CHANGE UP
PLATELET # BLD AUTO: 377 K/UL — SIGNIFICANT CHANGE UP (ref 150–400)
POIKILOCYTOSIS BLD QL AUTO: SLIGHT — SIGNIFICANT CHANGE UP
POLYCHROMASIA BLD QL SMEAR: SLIGHT — SIGNIFICANT CHANGE UP
POTASSIUM SERPL-MCNC: 3.3 MMOL/L — LOW (ref 3.5–5.3)
POTASSIUM SERPL-MCNC: 3.6 MMOL/L — SIGNIFICANT CHANGE UP (ref 3.5–5.3)
POTASSIUM SERPL-SCNC: 3.3 MMOL/L — LOW (ref 3.5–5.3)
POTASSIUM SERPL-SCNC: 3.6 MMOL/L — SIGNIFICANT CHANGE UP (ref 3.5–5.3)
PROT SERPL-MCNC: 8 GM/DL — SIGNIFICANT CHANGE UP (ref 6–8.3)
PROT SERPL-MCNC: 8.2 GM/DL — SIGNIFICANT CHANGE UP (ref 6–8.3)
RBC # BLD: 6.19 M/UL — HIGH (ref 4.2–5.8)
RBC # FLD: 22.5 % — HIGH (ref 10.3–14.5)
RBC BLD AUTO: ABNORMAL
ROULEAUX BLD QL SMEAR: PRESENT
SODIUM SERPL-SCNC: 143 MMOL/L — SIGNIFICANT CHANGE UP (ref 135–145)
SODIUM SERPL-SCNC: 144 MMOL/L — SIGNIFICANT CHANGE UP (ref 135–145)
STOMATOCYTES BLD QL SMEAR: SLIGHT — SIGNIFICANT CHANGE UP
TARGETS BLD QL SMEAR: SLIGHT — SIGNIFICANT CHANGE UP
WBC # BLD: 7.83 K/UL — SIGNIFICANT CHANGE UP (ref 3.8–10.5)
WBC # FLD AUTO: 7.83 K/UL — SIGNIFICANT CHANGE UP (ref 3.8–10.5)

## 2021-11-25 PROCEDURE — 99284 EMERGENCY DEPT VISIT MOD MDM: CPT

## 2021-11-25 RX ORDER — PHENOBARBITAL 60 MG
130 TABLET ORAL ONCE
Refills: 0 | Status: DISCONTINUED | OUTPATIENT
Start: 2021-11-25 | End: 2021-11-25

## 2021-11-25 RX ORDER — ONDANSETRON 8 MG/1
8 TABLET, FILM COATED ORAL ONCE
Refills: 0 | Status: COMPLETED | OUTPATIENT
Start: 2021-11-25 | End: 2021-11-25

## 2021-11-25 RX ORDER — FAMOTIDINE 10 MG/ML
20 INJECTION INTRAVENOUS ONCE
Refills: 0 | Status: COMPLETED | OUTPATIENT
Start: 2021-11-25 | End: 2021-11-25

## 2021-11-25 RX ORDER — SODIUM CHLORIDE 9 MG/ML
1000 INJECTION, SOLUTION INTRAVENOUS ONCE
Refills: 0 | Status: COMPLETED | OUTPATIENT
Start: 2021-11-25 | End: 2021-11-25

## 2021-11-25 RX ORDER — MORPHINE SULFATE 50 MG/1
4 CAPSULE, EXTENDED RELEASE ORAL ONCE
Refills: 0 | Status: DISCONTINUED | OUTPATIENT
Start: 2021-11-25 | End: 2021-11-25

## 2021-11-25 RX ORDER — PHENOBARBITAL 60 MG
240 TABLET ORAL ONCE
Refills: 0 | Status: DISCONTINUED | OUTPATIENT
Start: 2021-11-25 | End: 2021-11-25

## 2021-11-25 RX ORDER — KETOROLAC TROMETHAMINE 30 MG/ML
30 SYRINGE (ML) INJECTION ONCE
Refills: 0 | Status: DISCONTINUED | OUTPATIENT
Start: 2021-11-25 | End: 2021-11-25

## 2021-11-25 RX ORDER — MORPHINE SULFATE 50 MG/1
2 CAPSULE, EXTENDED RELEASE ORAL ONCE
Refills: 0 | Status: DISCONTINUED | OUTPATIENT
Start: 2021-11-25 | End: 2021-11-25

## 2021-11-25 RX ORDER — POTASSIUM CHLORIDE 20 MEQ
10 PACKET (EA) ORAL
Refills: 0 | Status: COMPLETED | OUTPATIENT
Start: 2021-11-25 | End: 2021-11-25

## 2021-11-25 RX ORDER — THIAMINE MONONITRATE (VIT B1) 100 MG
500 TABLET ORAL ONCE
Refills: 0 | Status: COMPLETED | OUTPATIENT
Start: 2021-11-25 | End: 2021-11-25

## 2021-11-25 RX ORDER — METOCLOPRAMIDE HCL 10 MG
10 TABLET ORAL ONCE
Refills: 0 | Status: COMPLETED | OUTPATIENT
Start: 2021-11-25 | End: 2021-11-25

## 2021-11-25 RX ADMIN — Medication 240 MILLIGRAM(S): at 15:47

## 2021-11-25 RX ADMIN — MORPHINE SULFATE 4 MILLIGRAM(S): 50 CAPSULE, EXTENDED RELEASE ORAL at 16:17

## 2021-11-25 RX ADMIN — Medication 105 MILLIGRAM(S): at 15:47

## 2021-11-25 RX ADMIN — MORPHINE SULFATE 2 MILLIGRAM(S): 50 CAPSULE, EXTENDED RELEASE ORAL at 19:27

## 2021-11-25 RX ADMIN — Medication 100 MILLIEQUIVALENT(S): at 20:36

## 2021-11-25 RX ADMIN — Medication 30 MILLIGRAM(S): at 23:55

## 2021-11-25 RX ADMIN — SODIUM CHLORIDE 1000 MILLILITER(S): 9 INJECTION, SOLUTION INTRAVENOUS at 15:47

## 2021-11-25 RX ADMIN — FAMOTIDINE 20 MILLIGRAM(S): 10 INJECTION INTRAVENOUS at 23:57

## 2021-11-25 RX ADMIN — SODIUM CHLORIDE 1000 MILLILITER(S): 9 INJECTION, SOLUTION INTRAVENOUS at 15:46

## 2021-11-25 RX ADMIN — Medication 30 MILLILITER(S): at 23:56

## 2021-11-25 RX ADMIN — Medication 100 MILLIEQUIVALENT(S): at 17:43

## 2021-11-25 RX ADMIN — ONDANSETRON 8 MILLIGRAM(S): 8 TABLET, FILM COATED ORAL at 16:17

## 2021-11-25 RX ADMIN — Medication 10 MILLIGRAM(S): at 17:42

## 2021-11-25 RX ADMIN — Medication 130 MILLIGRAM(S): at 17:42

## 2021-11-25 NOTE — ED ADULT TRIAGE NOTE - CHIEF COMPLAINT QUOTE
been on a drinking f Vodka last 3 days c/o abd pain hx pancreatitis Pt called ambulance when pain got too bad

## 2021-11-25 NOTE — ED ADULT NURSE NOTE - OBJECTIVE STATEMENT
57 YO M here for abd pain, nausea / vomiting, alcohol intox / withdrawal.  per pt he drinks vodka daily.    provider placed US PIV and sent labs, administered meds per mar.  pt vomiting clear brown emesis.

## 2021-11-25 NOTE — ED ADULT TRIAGE NOTE - HEART RATE (BEATS/MIN)
Patient stated that in order to avail with the [employer]/transportation for her appointment 03/16 to ortho, MD has to complete form that will be or being faxed to the clinic yesterday/today.  This needs md to say can use the paralift and fax back to [facility]. Pls check/watch out for this form. I didn't receive any. She said if [facility] ins doesn't receive it, she cannot go to her appt.   102

## 2021-11-25 NOTE — ED ADULT NURSE NOTE - CHPI ED NUR SYMPTOMS POS
If you are a smoker, it is important for your health to stop smoking. Please be aware that second hand smoke is also harmful. NAUSEA/PAIN/VOMITING

## 2021-11-25 NOTE — ED PROVIDER NOTE - PHYSICAL EXAMINATION
Gen: Alert, looks uncomfortable    Head: NC, AT   Eyes: PERRL, EOMI, normal lids/conjunctiva  ENT: normal hearing, patent oropharynx without erythema/exudate, uvula midline  Neck: supple, no tenderness, Trachea midline  Pulm: Bilateral BS, normal resp effort, no wheeze/stridor/retractions  CV: RRR, no M/R/G, 2+ radial and dp pulses bl, no edema  Abd: soft, actively vomiting   Mskel: extremities x4 with normal ROM and no joint effusions. no ctl spine ttp.   Skin: no rash, no bruising   Neuro: AAOx3, no sensory/motor deficits, CN 2-12 intact

## 2021-11-25 NOTE — ED PROVIDER NOTE - OBJECTIVE STATEMENT
56M PMH of chronic ETOH abuse with hx of alcohol withdrawal and DTs with frequent hospital admissions, alcoholic gastritis/esophagitis, PUD, HLD, hx of hypoxic respiratory failure requiring intubation due to aspiration PNA who presents to the ED for abdominal pain and vomiting. has been binge drinking. no fever. no cp. no sob. pt notes that he cannot stop drinking. ros neg for ha, vision loss, rhinorrhea, rash, bleeding, numbness, testicular pain, si.

## 2021-11-25 NOTE — ED PROVIDER NOTE - PATIENT PORTAL LINK FT
You can access the FollowMyHealth Patient Portal offered by [facility] by registering at the following website: [email or web address]. By joining [employer]’s FollowMyHealth portal, you will also be able to view your health information using other applications (apps) compatible with our system.

## 2021-11-25 NOTE — ED PROVIDER NOTE - CLINICAL SUMMARY MEDICAL DECISION MAKING FREE TEXT BOX
alcohol gastritis. nausea and vomiting. give fluids. thiamine. anti-emetics. analgesia. phenobarb for withdrawal prevention.

## 2021-11-26 VITALS
DIASTOLIC BLOOD PRESSURE: 93 MMHG | OXYGEN SATURATION: 98 % | HEART RATE: 90 BPM | TEMPERATURE: 98 F | SYSTOLIC BLOOD PRESSURE: 147 MMHG | RESPIRATION RATE: 18 BRPM

## 2021-11-26 PROCEDURE — 93010 ELECTROCARDIOGRAM REPORT: CPT

## 2021-11-26 RX ORDER — ACETAMINOPHEN 500 MG
650 TABLET ORAL ONCE
Refills: 0 | Status: COMPLETED | OUTPATIENT
Start: 2021-11-26 | End: 2021-11-26

## 2021-11-26 RX ADMIN — Medication 100 MILLIEQUIVALENT(S): at 01:12

## 2021-11-26 RX ADMIN — Medication 650 MILLIGRAM(S): at 02:15

## 2021-11-26 NOTE — ED ADULT NURSE REASSESSMENT NOTE - NS ED NURSE REASSESS COMMENT FT1
Patient walks without assistance and with steady gait. Patient tolerating PO intake with no vomiting. Patient denies pain and discomfort at this time. Patient in no acute distress with vitals within normal limits. Patient has no orders pending. Patient has no other symptoms at this time.

## 2021-11-29 NOTE — ED PROVIDER NOTE - CPE EDP GASTRO NORM
-- DO NOT REPLY / DO NOT REPLY ALL --  -- Message is from the [facility]--    General Patient Message      Reason for Call: Patient is needing the soonest possible appointment due to severe nosebleeds and is bleeding full clots. The paramedics have come to her house and been to the ER. They came in for a follow up with Dr. Novak and he said he was going to send a message to you for an earlier appointment, please call as soon as possible. Over the weekend her nose was packed at immediate care.     Caller Information       Type Contact Phone    11/29/2021 10:32 AM CST Phone (Incoming) ROBERT CORTEZ (Emergency Contact) [phone]          Alternative phone number: none     Turnaround time given to caller:   \"This message will be sent to [state Provider's name]. The clinical team will fulfill your request as soon as they review your message.\"    
normal...

## 2021-12-12 ENCOUNTER — EMERGENCY (EMERGENCY)
Facility: HOSPITAL | Age: 56
LOS: 0 days | Discharge: HOME | End: 2021-12-12
Payer: MEDICAID

## 2021-12-12 VITALS
OXYGEN SATURATION: 98 % | TEMPERATURE: 98 F | SYSTOLIC BLOOD PRESSURE: 154 MMHG | WEIGHT: 179.9 LBS | DIASTOLIC BLOOD PRESSURE: 99 MMHG | HEART RATE: 123 BPM | HEIGHT: 67 IN | RESPIRATION RATE: 19 BRPM

## 2021-12-12 VITALS
OXYGEN SATURATION: 98 % | DIASTOLIC BLOOD PRESSURE: 85 MMHG | RESPIRATION RATE: 18 BRPM | SYSTOLIC BLOOD PRESSURE: 125 MMHG | TEMPERATURE: 98 F | HEART RATE: 113 BPM

## 2021-12-12 DIAGNOSIS — Z79.82 LONG TERM (CURRENT) USE OF ASPIRIN: ICD-10-CM

## 2021-12-12 DIAGNOSIS — R11.10 VOMITING, UNSPECIFIED: ICD-10-CM

## 2021-12-12 DIAGNOSIS — R10.9 UNSPECIFIED ABDOMINAL PAIN: ICD-10-CM

## 2021-12-12 DIAGNOSIS — F10.10 ALCOHOL ABUSE, UNCOMPLICATED: ICD-10-CM

## 2021-12-12 LAB
ALBUMIN SERPL ELPH-MCNC: 4.2 G/DL — SIGNIFICANT CHANGE UP (ref 3.3–5)
ALP SERPL-CCNC: 55 U/L — SIGNIFICANT CHANGE UP (ref 40–120)
ALT FLD-CCNC: 22 U/L — SIGNIFICANT CHANGE UP (ref 12–78)
ANION GAP SERPL CALC-SCNC: 15 MMOL/L — SIGNIFICANT CHANGE UP (ref 5–17)
AST SERPL-CCNC: 21 U/L — SIGNIFICANT CHANGE UP (ref 15–37)
BASOPHILS # BLD AUTO: 0.07 K/UL — SIGNIFICANT CHANGE UP (ref 0–0.2)
BASOPHILS NFR BLD AUTO: 1.6 % — SIGNIFICANT CHANGE UP (ref 0–2)
BILIRUB DIRECT SERPL-MCNC: 0.1 MG/DL — SIGNIFICANT CHANGE UP (ref 0–0.3)
BILIRUB INDIRECT FLD-MCNC: 0.2 MG/DL — SIGNIFICANT CHANGE UP (ref 0.2–1)
BILIRUB SERPL-MCNC: 0.3 MG/DL — SIGNIFICANT CHANGE UP (ref 0.2–1.2)
BUN SERPL-MCNC: 15 MG/DL — SIGNIFICANT CHANGE UP (ref 7–23)
CALCIUM SERPL-MCNC: 9.5 MG/DL — SIGNIFICANT CHANGE UP (ref 8.5–10.1)
CHLORIDE SERPL-SCNC: 109 MMOL/L — HIGH (ref 96–108)
CO2 SERPL-SCNC: 22 MMOL/L — SIGNIFICANT CHANGE UP (ref 22–31)
CREAT SERPL-MCNC: 1.3 MG/DL — SIGNIFICANT CHANGE UP (ref 0.5–1.3)
EOSINOPHIL # BLD AUTO: 0.11 K/UL — SIGNIFICANT CHANGE UP (ref 0–0.5)
EOSINOPHIL NFR BLD AUTO: 2.5 % — SIGNIFICANT CHANGE UP (ref 0–6)
ETHANOL SERPL-MCNC: 282 MG/DL — HIGH (ref 0–10)
GLUCOSE BLDC GLUCOMTR-MCNC: 122 MG/DL — HIGH (ref 70–99)
GLUCOSE SERPL-MCNC: 127 MG/DL — HIGH (ref 70–99)
HCT VFR BLD CALC: 42.3 % — SIGNIFICANT CHANGE UP (ref 39–50)
HGB BLD-MCNC: 13.4 G/DL — SIGNIFICANT CHANGE UP (ref 13–17)
IMM GRANULOCYTES NFR BLD AUTO: 0.2 % — SIGNIFICANT CHANGE UP (ref 0–1.5)
LIDOCAIN IGE QN: 189 U/L — SIGNIFICANT CHANGE UP (ref 73–393)
LYMPHOCYTES # BLD AUTO: 2.58 K/UL — SIGNIFICANT CHANGE UP (ref 1–3.3)
LYMPHOCYTES # BLD AUTO: 58.9 % — HIGH (ref 13–44)
MCHC RBC-ENTMCNC: 23.5 PG — LOW (ref 27–34)
MCHC RBC-ENTMCNC: 31.7 GM/DL — LOW (ref 32–36)
MCV RBC AUTO: 74.1 FL — LOW (ref 80–100)
MONOCYTES # BLD AUTO: 0.27 K/UL — SIGNIFICANT CHANGE UP (ref 0–0.9)
MONOCYTES NFR BLD AUTO: 6.2 % — SIGNIFICANT CHANGE UP (ref 2–14)
NEUTROPHILS # BLD AUTO: 1.34 K/UL — LOW (ref 1.8–7.4)
NEUTROPHILS NFR BLD AUTO: 30.6 % — LOW (ref 43–77)
NRBC # BLD: 0 /100 WBCS — SIGNIFICANT CHANGE UP (ref 0–0)
PLATELET # BLD AUTO: 377 K/UL — SIGNIFICANT CHANGE UP (ref 150–400)
POTASSIUM SERPL-MCNC: 3.3 MMOL/L — LOW (ref 3.5–5.3)
POTASSIUM SERPL-SCNC: 3.3 MMOL/L — LOW (ref 3.5–5.3)
PROT SERPL-MCNC: 9.2 GM/DL — HIGH (ref 6–8.3)
RBC # BLD: 5.71 M/UL — SIGNIFICANT CHANGE UP (ref 4.2–5.8)
RBC # FLD: 22.9 % — HIGH (ref 10.3–14.5)
SODIUM SERPL-SCNC: 146 MMOL/L — HIGH (ref 135–145)
WBC # BLD: 4.38 K/UL — SIGNIFICANT CHANGE UP (ref 3.8–10.5)
WBC # FLD AUTO: 4.38 K/UL — SIGNIFICANT CHANGE UP (ref 3.8–10.5)

## 2021-12-12 PROCEDURE — 99285 EMERGENCY DEPT VISIT HI MDM: CPT

## 2021-12-12 PROCEDURE — 93010 ELECTROCARDIOGRAM REPORT: CPT

## 2021-12-12 RX ORDER — THIAMINE MONONITRATE (VIT B1) 100 MG
500 TABLET ORAL ONCE
Refills: 0 | Status: COMPLETED | OUTPATIENT
Start: 2021-12-12 | End: 2021-12-12

## 2021-12-12 RX ORDER — ONDANSETRON 8 MG/1
4 TABLET, FILM COATED ORAL ONCE
Refills: 0 | Status: COMPLETED | OUTPATIENT
Start: 2021-12-12 | End: 2021-12-12

## 2021-12-12 RX ORDER — METOCLOPRAMIDE HCL 10 MG
10 TABLET ORAL ONCE
Refills: 0 | Status: COMPLETED | OUTPATIENT
Start: 2021-12-12 | End: 2021-12-12

## 2021-12-12 RX ORDER — SODIUM CHLORIDE 9 MG/ML
1000 INJECTION INTRAMUSCULAR; INTRAVENOUS; SUBCUTANEOUS ONCE
Refills: 0 | Status: COMPLETED | OUTPATIENT
Start: 2021-12-12 | End: 2021-12-12

## 2021-12-12 RX ORDER — PHENOBARBITAL 60 MG
260 TABLET ORAL ONCE
Refills: 0 | Status: DISCONTINUED | OUTPATIENT
Start: 2021-12-12 | End: 2021-12-12

## 2021-12-12 RX ORDER — SODIUM CHLORIDE 9 MG/ML
1000 INJECTION, SOLUTION INTRAVENOUS ONCE
Refills: 0 | Status: COMPLETED | OUTPATIENT
Start: 2021-12-12 | End: 2021-12-12

## 2021-12-12 RX ORDER — ONDANSETRON 8 MG/1
4 TABLET, FILM COATED ORAL ONCE
Refills: 0 | Status: DISCONTINUED | OUTPATIENT
Start: 2021-12-12 | End: 2021-12-12

## 2021-12-12 RX ADMIN — Medication 260 MILLIGRAM(S): at 20:11

## 2021-12-12 RX ADMIN — SODIUM CHLORIDE 1000 MILLILITER(S): 9 INJECTION INTRAMUSCULAR; INTRAVENOUS; SUBCUTANEOUS at 19:02

## 2021-12-12 RX ADMIN — SODIUM CHLORIDE 1000 MILLILITER(S): 9 INJECTION, SOLUTION INTRAVENOUS at 20:24

## 2021-12-12 RX ADMIN — SODIUM CHLORIDE 1000 MILLILITER(S): 9 INJECTION INTRAMUSCULAR; INTRAVENOUS; SUBCUTANEOUS at 20:12

## 2021-12-12 RX ADMIN — Medication 105 MILLIGRAM(S): at 20:25

## 2021-12-12 RX ADMIN — Medication 10 MILLIGRAM(S): at 19:34

## 2021-12-12 RX ADMIN — ONDANSETRON 4 MILLIGRAM(S): 8 TABLET, FILM COATED ORAL at 19:02

## 2021-12-12 NOTE — ED PROVIDER NOTE - ASSOCIATED SYMPTOMS
[address] [facility] at [address] [place] [address]  [phone]    Hematology / Oncology Follow Up    Reason for Visit:   Veronique Puckett is a 78 y.o. male who is seen for follow up of lymphoma. Treatment History:   · CTA Chest 11/11/2016: PE in left lower lobe pulmonary arteries, extensive adneopathy  · CT A/P 11/12/2016: Cirrhosis, massive splenomegaly, ascites, massive lymphadenopathy  · US guided axillary node biopsy 11/14/2016: CD5+ B-cell non-hodgkin lymphoma, phenotype most consistent with mantle cell lymphoma. FISH with t(11;14)  · PET-CT 11/23/2016: Marked splenomegaly with increased metabolic activity consistent with lymphoma. Bilateral cervical and axillary adenopathy. Mediastinal and right hilar and left diaphragmatic adenopathy. Bilateral pelvic and inguinal adenopathy. Multiple small mesenteric and retroperitoneal nodes with low grade or no activity. · BMBx 11/29/2016: Hypercellular marrow involved by mantle cell lymphoma  · Stage IV Mantle Cell Lymphoma  · Palliative chemotherapy with Rituximab and Bendamustine x6 cycles from 12/8/2016 to 5/12/17  · Bone marrow biopsy 5/23/2017: no evidence of lymphoma  · PET/CT 6/7/2017: There has been marked improvement in the adenopathy in the cervical region, axilla, mediastinum, abdomen and pelvis which now demonstrate no increased metabolic activity. There has been marked decrease in the spleen which now demonstrates no increased metabolic activity. History of Present Illness:   Here today for follow up. He reports he continues to feel well. Denies fatigue, pain, infections. Denies fevers, chills, night sweats, weight loss, adenopathy. No complaints today. He is unaccompanied today. PAST HISTORY: The following sections were reviewed and updated in the EMR as appropriate: PMH, SH, FH, Medications, Allergies. No Known Allergies     Review of Systems: A complete review of systems was obtained, reviewed, and scanned into the EMR. Pertinent findings reviewed above. Physical Exam:     Visit Vitals    /87 (BP 1 Location: Left arm, BP Patient Position: Sitting)    Pulse 68    Temp 96.3 °F (35.7 °C) (Oral)    Resp 18    Ht 5' 11\" (1.803 m)    Wt 237 lb 9.6 oz (107.8 kg)    SpO2 96%    BMI 33.14 kg/m2     ECOG PS: 0  General: No distress  Eyes: PERRLA, anicteric sclerae  HENT: Atraumatic, OP clear  Neck: Supple  Lymphatic: No cervical, supraclavicular, or inguinal adenopathy  Respiratory: CTAB, normal respiratory effort  CV: Normal rate, regular rhythm, no murmurs. Bilateral LE edema, 1+, compression stockings in place  GI: Soft, nontender, nondistended, no masses, no hepatomegaly, no splenomegaly  MS: Normal gait and station. Digits without clubbing or cyanosis. Skin: No rashes, ecchymoses, or petechiae. Normal temperature, turgor, and texture. Psych: Alert, oriented, appropriate affect, normal judgment/insight    Results:     Lab Results   Component Value Date/Time    WBC 4.6 08/31/2017 09:04 AM    HGB 11.9 08/31/2017 09:04 AM    HCT 35.9 08/31/2017 09:04 AM    PLATELET 990 26/16/3335 09:04 AM    MCV 91.8 08/31/2017 09:04 AM    ABS.  NEUTROPHILS 3.2 08/31/2017 09:04 AM    Hemoglobin (POC) 10.9 01/12/2017 08:02 AM    Hematocrit (POC) 32 01/12/2017 08:02 AM     Lab Results   Component Value Date/Time    Sodium 143 08/31/2017 09:04 AM    Potassium 4.4 08/31/2017 09:04 AM    Chloride 106 08/31/2017 09:04 AM    CO2 30 08/31/2017 09:04 AM    Glucose 98 08/31/2017 09:04 AM    BUN 27 08/31/2017 09:04 AM    Creatinine 1.22 08/31/2017 09:04 AM    GFR est AA >60 08/31/2017 09:04 AM    GFR est non-AA 58 08/31/2017 09:04 AM    Calcium 9.5 08/31/2017 09:04 AM    Sodium (POC) 143 01/12/2017 08:02 AM    Potassium (POC) 4.0 01/12/2017 08:02 AM    Chloride (POC) 104 01/12/2017 08:02 AM    Glucose (POC) 156 01/12/2017 08:02 AM    BUN (POC) 23 01/12/2017 08:02 AM    Creatinine (POC) 0.8 01/12/2017 08:02 AM    Calcium, ionized (POC) 1.19 01/12/2017 08:02 AM     Lab Results   Component Value Date/Time    Bilirubin, total 0.5 08/31/2017 09:04 AM    ALT (SGPT) 24 08/31/2017 09:04 AM    AST (SGOT) 20 08/31/2017 09:04 AM    Alk. phosphatase 95 08/31/2017 09:04 AM    Protein, total 7.4 08/31/2017 09:04 AM    Albumin 3.8 08/31/2017 09:04 AM    Globulin 3.6 08/31/2017 09:04 AM     Lab Results   Component Value Date/Time    Reticulocyte count 1.8 11/10/2016 02:00 PM    Iron % saturation 11 11/10/2016 02:00 PM    TIBC 240 11/10/2016 02:00 PM    Ferritin 100 11/10/2016 02:00 PM    Vitamin B12 415 11/10/2016 02:00 PM    Folate 15.6 11/10/2016 02:00 PM    Haptoglobin 186 11/11/2016 12:04 PM     08/31/2017 09:04 AM    Beta-2 Microglobulin, serum 4.4 11/15/2016 05:21 AM    TSH 4.06 11/10/2016 11:21 AM    Hep C  virus Ab Interp. NONREACTIVE 08/02/2016 08:54 AM     Lab Results   Component Value Date/Time    INR 1.1 11/11/2016 12:04 PM    aPTT 31.3 11/11/2016 12:04 PM    Fibrinogen 215 11/11/2016 12:04 PM       HepB/C negative  TTE 11/10/2016: EF 60%    Bone marrow biopsy 11/29/2016: Hypercellular marrow involved by mantle cell lymphoma    Bone marrow biopsy 5/23/17: no evidence of lymphoma    PET 6/7/17: no abnormal hypermetabolism    Assessment:   1) Mantle cell lymphoma  Stage IV  His MIPI score was 6.24, which is High Risk and cooresponds to a median survival of 37 months and 5-year OS of 20%. He has completed chemotherapy with R-Bendamustine for 6 cycles. He had an excellent response to therapy based on repeat bone marrow biopsy and PET/CT. He has decided against transplant or maintenance rituximab. He is feeling well. We will proceed with surveillance and see him back in about 12 weeks. Scans every 6 months, due 12/2017.    2) DVT, PE  Diagnosed 11/11/2016. Malignancy associated. Previously on lovenox. Now on apixaban BID, continue. 3) Anemia  Mild on last check. Repeat today. 4) Thrombocytopenia  Mild. Secondary to splenomegaly/cirrhosis. Monitor, recheck today. 5) Port care  Continue port flushes every 4-6 weeks.      Plan:     · Continue apixiban 5mg PO BID  · Port flush and labs every 12 weeks: CBC, CMP, LD  · CT every 6 months, due 12/2017  · Return to clinic in 12 weeks       Signed By: Hermila Minor MD     August 31, 2017 vomiting

## 2021-12-12 NOTE — ED ADULT NURSE NOTE - OBJECTIVE STATEMENT
57 YO M here for abd pain, nausea / vomiting, alcohol intox / withdrawal.  per pt he drinks vodka daily.    provider placed US PIV and sent labs, administered meds per mar.  pt vomiting clear brown emesis. Assisting primary RN. 56M PMH of chronic ETOH abuse with hx of alcohol withdrawal and DTs with frequent hospital admissions, alcoholic gastritis/esophagitis, PUD, HLD, hx of hypoxic respiratory failure requiring intubation due to aspiration PNA who presents to the ED for abdominal pain and vomiting. has been binge drinking. no fever. no cp. no sob. pt notes that he cannot stop drinking. ros neg for ha, vision loss, rhinorrhea, rash, bleeding, numbness, testicular pain, si per pt he drinks vodka daily.

## 2021-12-12 NOTE — ED ADULT NURSE NOTE - NS ED PATIENT SAFETY CONCERN
Myra Mesa currently taking Buspar 5 mg bid. Having an increased problem with anxiety, keeping him from social acitivities and sleeping. Asking to have this increased to 1-1/2 tablets daily.      [employer]

## 2021-12-12 NOTE — ED PROVIDER NOTE - CLINICAL SUMMARY MEDICAL DECISION MAKING FREE TEXT BOX
55 y/o m with alcohol abuse, treated with meds, labs shows  at 19:01 pt admits to feeling better ambulated with normal gait in the ER while pt was getting discharged he decided to walk out without signing dc paper, Dr. Cronin is aware 57 y/o m with alcohol abuse, treated with meds, labs shows  at 19:01 pt admits to feeling better ambulated with normal gait in the ER while pt was getting discharged he decided to walk out without signing dc paper, Dr. Cronin is aware pt left at 22:21

## 2021-12-12 NOTE — ED PROVIDER NOTE - PATIENT PORTAL LINK FT
You can access the FollowMyHealth Patient Portal offered by [facility] by registering at the following website: [email or web address]. By joining [employer]’s FollowMyHealth portal, you will also be able to view your health information using other applications (apps) compatible with our system.

## 2021-12-12 NOTE — ED ADULT NURSE REASSESSMENT NOTE - NS ED NURSE REASSESS COMMENT FT1
Pt removed his IV, wanting to be discharged, " states my son is waiting for me, he's going to pick me up". Provider made aware.

## 2021-12-12 NOTE — ED PROVIDER NOTE - OBJECTIVE STATEMENT
56M PMH of chronic ETOH abuse with hx of alcohol withdrawal and DTs with frequent hospital admissions, alcoholic gastritis/esophagitis, PUD, HLD, hx of hypoxic respiratory failure requiring intubation due to aspiration PNA who presents to the ED for abdominal pain and vomiting. has been binge drinking. no fever. no cp. no sob. pt notes that he cannot stop drinking. ros neg for ha, vision loss, rhinorrhea, rash, bleeding, numbness, testicular pain, si. 56M PMH of chronic ETOH abuse with hx of alcohol withdrawal and DTs with frequent hospital admissions, alcoholic gastritis/esophagitis, PUD, HLD, hx of hypoxic respiratory failure requiring intubation due to aspiration PNA who presents to the ED for abdominal pain and vomiting. has been binge drinking. no fever. no cp. no sob. pt notes that he cannot stop drinking, denies SI.

## 2021-12-12 NOTE — ED ADULT NURSE REASSESSMENT NOTE - NS ED NURSE REASSESS COMMENT FT1
Pt due to be discharged to ED for chest pain. Elevated D-dimer. Troponin negative, CT chest shows Negative for pulmonary embolism. Lower lobe opacities, which may represent atelectasis and/or pneumonia.  Small right pleural effusion. Respirations even unlaboured. Skin warm, dry and intact. Vitals stable. Pending completion of antibiotics then discharge.

## 2021-12-15 ENCOUNTER — INPATIENT (INPATIENT)
Facility: HOSPITAL | Age: 56
LOS: 1 days | Discharge: HOME | End: 2021-12-17
Attending: INTERNAL MEDICINE | Admitting: INTERNAL MEDICINE
Payer: MEDICAID

## 2021-12-15 VITALS
SYSTOLIC BLOOD PRESSURE: 157 MMHG | OXYGEN SATURATION: 98 % | WEIGHT: 175.05 LBS | HEART RATE: 123 BPM | HEIGHT: 67 IN | TEMPERATURE: 98 F | DIASTOLIC BLOOD PRESSURE: 87 MMHG | RESPIRATION RATE: 22 BRPM

## 2021-12-15 LAB
ALBUMIN SERPL ELPH-MCNC: 3.6 G/DL — SIGNIFICANT CHANGE UP (ref 3.3–5)
ALP SERPL-CCNC: 55 U/L — SIGNIFICANT CHANGE UP (ref 40–120)
ALT FLD-CCNC: 21 U/L — SIGNIFICANT CHANGE UP (ref 12–78)
ANION GAP SERPL CALC-SCNC: 12 MMOL/L — SIGNIFICANT CHANGE UP (ref 5–17)
AST SERPL-CCNC: 34 U/L — SIGNIFICANT CHANGE UP (ref 15–37)
BASOPHILS # BLD AUTO: 0.08 K/UL — SIGNIFICANT CHANGE UP (ref 0–0.2)
BASOPHILS NFR BLD AUTO: 1.3 % — SIGNIFICANT CHANGE UP (ref 0–2)
BILIRUB DIRECT SERPL-MCNC: 0.1 MG/DL — SIGNIFICANT CHANGE UP (ref 0–0.3)
BILIRUB INDIRECT FLD-MCNC: 0.2 MG/DL — SIGNIFICANT CHANGE UP (ref 0.2–1)
BILIRUB SERPL-MCNC: 0.3 MG/DL — SIGNIFICANT CHANGE UP (ref 0.2–1.2)
BUN SERPL-MCNC: 11 MG/DL — SIGNIFICANT CHANGE UP (ref 7–23)
CALCIUM SERPL-MCNC: 8.6 MG/DL — SIGNIFICANT CHANGE UP (ref 8.5–10.1)
CHLORIDE SERPL-SCNC: 105 MMOL/L — SIGNIFICANT CHANGE UP (ref 96–108)
CO2 SERPL-SCNC: 25 MMOL/L — SIGNIFICANT CHANGE UP (ref 22–31)
CREAT SERPL-MCNC: 1.1 MG/DL — SIGNIFICANT CHANGE UP (ref 0.5–1.3)
EOSINOPHIL # BLD AUTO: 0.04 K/UL — SIGNIFICANT CHANGE UP (ref 0–0.5)
EOSINOPHIL NFR BLD AUTO: 0.6 % — SIGNIFICANT CHANGE UP (ref 0–6)
GLUCOSE SERPL-MCNC: 107 MG/DL — HIGH (ref 70–99)
HCT VFR BLD CALC: 38.9 % — LOW (ref 39–50)
HGB BLD-MCNC: 12.3 G/DL — LOW (ref 13–17)
IMM GRANULOCYTES NFR BLD AUTO: 0.3 % — SIGNIFICANT CHANGE UP (ref 0–1.5)
LACTATE SERPL-SCNC: 3.9 MMOL/L — HIGH (ref 0.7–2)
LIDOCAIN IGE QN: 195 U/L — SIGNIFICANT CHANGE UP (ref 73–393)
LYMPHOCYTES # BLD AUTO: 1.89 K/UL — SIGNIFICANT CHANGE UP (ref 1–3.3)
LYMPHOCYTES # BLD AUTO: 29.8 % — SIGNIFICANT CHANGE UP (ref 13–44)
MAGNESIUM SERPL-MCNC: 2.6 MG/DL — SIGNIFICANT CHANGE UP (ref 1.6–2.6)
MCHC RBC-ENTMCNC: 23.2 PG — LOW (ref 27–34)
MCHC RBC-ENTMCNC: 31.6 GM/DL — LOW (ref 32–36)
MCV RBC AUTO: 73.3 FL — LOW (ref 80–100)
MONOCYTES # BLD AUTO: 0.3 K/UL — SIGNIFICANT CHANGE UP (ref 0–0.9)
MONOCYTES NFR BLD AUTO: 4.7 % — SIGNIFICANT CHANGE UP (ref 2–14)
NEUTROPHILS # BLD AUTO: 4.02 K/UL — SIGNIFICANT CHANGE UP (ref 1.8–7.4)
NEUTROPHILS NFR BLD AUTO: 63.3 % — SIGNIFICANT CHANGE UP (ref 43–77)
NRBC # BLD: 0 /100 WBCS — SIGNIFICANT CHANGE UP (ref 0–0)
PHOSPHATE SERPL-MCNC: 3.5 MG/DL — SIGNIFICANT CHANGE UP (ref 2.5–4.5)
PLATELET # BLD AUTO: 343 K/UL — SIGNIFICANT CHANGE UP (ref 150–400)
POTASSIUM SERPL-MCNC: 3.9 MMOL/L — SIGNIFICANT CHANGE UP (ref 3.5–5.3)
POTASSIUM SERPL-SCNC: 3.9 MMOL/L — SIGNIFICANT CHANGE UP (ref 3.5–5.3)
PROT SERPL-MCNC: 8.2 GM/DL — SIGNIFICANT CHANGE UP (ref 6–8.3)
RBC # BLD: 5.31 M/UL — SIGNIFICANT CHANGE UP (ref 4.2–5.8)
RBC # FLD: 22.4 % — HIGH (ref 10.3–14.5)
SODIUM SERPL-SCNC: 142 MMOL/L — SIGNIFICANT CHANGE UP (ref 135–145)
TROPONIN I, HIGH SENSITIVITY RESULT: 9.5 NG/L — SIGNIFICANT CHANGE UP
WBC # BLD: 6.35 K/UL — SIGNIFICANT CHANGE UP (ref 3.8–10.5)
WBC # FLD AUTO: 6.35 K/UL — SIGNIFICANT CHANGE UP (ref 3.8–10.5)

## 2021-12-15 PROCEDURE — 99285 EMERGENCY DEPT VISIT HI MDM: CPT

## 2021-12-15 PROCEDURE — 93010 ELECTROCARDIOGRAM REPORT: CPT

## 2021-12-15 PROCEDURE — 71045 X-RAY EXAM CHEST 1 VIEW: CPT | Mod: 26

## 2021-12-15 RX ORDER — THIAMINE MONONITRATE (VIT B1) 100 MG
100 TABLET ORAL ONCE
Refills: 0 | Status: COMPLETED | OUTPATIENT
Start: 2021-12-15 | End: 2021-12-15

## 2021-12-15 RX ORDER — SODIUM CHLORIDE 9 MG/ML
1000 INJECTION INTRAMUSCULAR; INTRAVENOUS; SUBCUTANEOUS ONCE
Refills: 0 | Status: COMPLETED | OUTPATIENT
Start: 2021-12-15 | End: 2021-12-15

## 2021-12-15 RX ORDER — SODIUM CHLORIDE 9 MG/ML
1000 INJECTION, SOLUTION INTRAVENOUS
Refills: 0 | Status: DISCONTINUED | OUTPATIENT
Start: 2021-12-15 | End: 2021-12-17

## 2021-12-15 RX ORDER — FAMOTIDINE 10 MG/ML
20 INJECTION INTRAVENOUS ONCE
Refills: 0 | Status: COMPLETED | OUTPATIENT
Start: 2021-12-15 | End: 2021-12-15

## 2021-12-15 RX ADMIN — SODIUM CHLORIDE 1000 MILLILITER(S): 9 INJECTION INTRAMUSCULAR; INTRAVENOUS; SUBCUTANEOUS at 18:17

## 2021-12-15 RX ADMIN — Medication 1 TABLET(S): at 21:48

## 2021-12-15 RX ADMIN — SODIUM CHLORIDE 1000 MILLILITER(S): 9 INJECTION INTRAMUSCULAR; INTRAVENOUS; SUBCUTANEOUS at 20:52

## 2021-12-15 RX ADMIN — Medication 100 MILLIGRAM(S): at 21:48

## 2021-12-15 RX ADMIN — Medication 2 MILLIGRAM(S): at 18:17

## 2021-12-15 RX ADMIN — FAMOTIDINE 20 MILLIGRAM(S): 10 INJECTION INTRAVENOUS at 20:51

## 2021-12-15 NOTE — ED ADULT NURSE NOTE - NSIMPLEMENTINTERV_GEN_ALL_ED
Implemented All Fall Risk Interventions:  [place] to call system. Call bell, personal items and telephone within reach. Instruct patient to call for assistance. Room bathroom lighting operational. Non-slip footwear when patient is off stretcher. Physically safe environment: no spills, clutter or unnecessary equipment. Stretcher in lowest position, wheels locked, appropriate side rails in place. Provide visual cue, wrist band, yellow gown, etc. Monitor gait and stability. Monitor for mental status changes and reorient to person, place, and time. Review medications for side effects contributing to fall risk. Reinforce activity limits and safety measures with patient and family.

## 2021-12-15 NOTE — ED ADULT NURSE NOTE - OBJECTIVE STATEMENT
Patient A&Ox2. Pt poor historian. Pt states last drink was 3 days. Pt complains of headache, ABD pain, and body aches. Denies chest pain, SOB.

## 2021-12-15 NOTE — ED ADULT NURSE REASSESSMENT NOTE - NS ED NURSE REASSESS COMMENT FT1
Patient was received in bed calm and relaxed sleeping . Patient has a an IV to left foot functioning well

## 2021-12-16 DIAGNOSIS — F10.239 ALCOHOL DEPENDENCE WITH WITHDRAWAL, UNSPECIFIED: ICD-10-CM

## 2021-12-16 DIAGNOSIS — K29.20 ALCOHOLIC GASTRITIS WITHOUT BLEEDING: ICD-10-CM

## 2021-12-16 DIAGNOSIS — E87.2 ACIDOSIS: ICD-10-CM

## 2021-12-16 LAB
ANION GAP SERPL CALC-SCNC: 8 MMOL/L — SIGNIFICANT CHANGE UP (ref 5–17)
APPEARANCE UR: CLEAR — SIGNIFICANT CHANGE UP
BACTERIA # UR AUTO: ABNORMAL
BILIRUB UR-MCNC: NEGATIVE — SIGNIFICANT CHANGE UP
BUN SERPL-MCNC: 12 MG/DL — SIGNIFICANT CHANGE UP (ref 7–23)
CALCIUM SERPL-MCNC: 7.8 MG/DL — LOW (ref 8.5–10.1)
CHLORIDE SERPL-SCNC: 111 MMOL/L — HIGH (ref 96–108)
CO2 SERPL-SCNC: 25 MMOL/L — SIGNIFICANT CHANGE UP (ref 22–31)
COLOR SPEC: YELLOW — SIGNIFICANT CHANGE UP
COMMENT - URINE: SIGNIFICANT CHANGE UP
CREAT SERPL-MCNC: 0.92 MG/DL — SIGNIFICANT CHANGE UP (ref 0.5–1.3)
DIFF PNL FLD: NEGATIVE — SIGNIFICANT CHANGE UP
EPI CELLS # UR: NEGATIVE — SIGNIFICANT CHANGE UP
FLUAV AG NPH QL: SIGNIFICANT CHANGE UP
FLUBV AG NPH QL: SIGNIFICANT CHANGE UP
GLUCOSE SERPL-MCNC: 90 MG/DL — SIGNIFICANT CHANGE UP (ref 70–99)
GLUCOSE UR QL: NEGATIVE MG/DL — SIGNIFICANT CHANGE UP
HCT VFR BLD CALC: 32.9 % — LOW (ref 39–50)
HGB BLD-MCNC: 10 G/DL — LOW (ref 13–17)
KETONES UR-MCNC: NEGATIVE — SIGNIFICANT CHANGE UP
LACTATE SERPL-SCNC: 2.7 MMOL/L — HIGH (ref 0.7–2)
LACTATE SERPL-SCNC: 3.8 MMOL/L — HIGH (ref 0.7–2)
LEUKOCYTE ESTERASE UR-ACNC: NEGATIVE — SIGNIFICANT CHANGE UP
MAGNESIUM SERPL-MCNC: 2.4 MG/DL — SIGNIFICANT CHANGE UP (ref 1.6–2.6)
MCHC RBC-ENTMCNC: 22.9 PG — LOW (ref 27–34)
MCHC RBC-ENTMCNC: 30.4 GM/DL — LOW (ref 32–36)
MCV RBC AUTO: 75.5 FL — LOW (ref 80–100)
NITRITE UR-MCNC: NEGATIVE — SIGNIFICANT CHANGE UP
NRBC # BLD: 0 /100 WBCS — SIGNIFICANT CHANGE UP (ref 0–0)
PH UR: 6 — SIGNIFICANT CHANGE UP (ref 5–8)
PHOSPHATE SERPL-MCNC: 2 MG/DL — LOW (ref 2.5–4.5)
PLATELET # BLD AUTO: 282 K/UL — SIGNIFICANT CHANGE UP (ref 150–400)
POTASSIUM SERPL-MCNC: 3.5 MMOL/L — SIGNIFICANT CHANGE UP (ref 3.5–5.3)
POTASSIUM SERPL-SCNC: 3.5 MMOL/L — SIGNIFICANT CHANGE UP (ref 3.5–5.3)
PROT UR-MCNC: 15 MG/DL
RBC # BLD: 4.36 M/UL — SIGNIFICANT CHANGE UP (ref 4.2–5.8)
RBC # FLD: 22.3 % — HIGH (ref 10.3–14.5)
RBC CASTS # UR COMP ASSIST: NEGATIVE /HPF — SIGNIFICANT CHANGE UP (ref 0–4)
SARS-COV-2 RNA SPEC QL NAA+PROBE: SIGNIFICANT CHANGE UP
SODIUM SERPL-SCNC: 144 MMOL/L — SIGNIFICANT CHANGE UP (ref 135–145)
SP GR SPEC: 1.02 — SIGNIFICANT CHANGE UP (ref 1.01–1.02)
UROBILINOGEN FLD QL: NEGATIVE MG/DL — SIGNIFICANT CHANGE UP
WBC # BLD: 4.32 K/UL — SIGNIFICANT CHANGE UP (ref 3.8–10.5)
WBC # FLD AUTO: 4.32 K/UL — SIGNIFICANT CHANGE UP (ref 3.8–10.5)
WBC UR QL: SIGNIFICANT CHANGE UP

## 2021-12-16 PROCEDURE — 93010 ELECTROCARDIOGRAM REPORT: CPT

## 2021-12-16 PROCEDURE — 99222 1ST HOSP IP/OBS MODERATE 55: CPT

## 2021-12-16 RX ORDER — THIAMINE MONONITRATE (VIT B1) 100 MG
200 TABLET ORAL DAILY
Refills: 0 | Status: DISCONTINUED | OUTPATIENT
Start: 2021-12-16 | End: 2021-12-16

## 2021-12-16 RX ORDER — SODIUM CHLORIDE 9 MG/ML
1000 INJECTION, SOLUTION INTRAVENOUS
Refills: 0 | Status: DISCONTINUED | OUTPATIENT
Start: 2021-12-16 | End: 2021-12-16

## 2021-12-16 RX ORDER — THIAMINE MONONITRATE (VIT B1) 100 MG
100 TABLET ORAL DAILY
Refills: 0 | Status: DISCONTINUED | OUTPATIENT
Start: 2021-12-16 | End: 2021-12-16

## 2021-12-16 RX ORDER — SUCRALFATE 1 G
1 TABLET ORAL
Refills: 0 | Status: DISCONTINUED | OUTPATIENT
Start: 2021-12-16 | End: 2021-12-17

## 2021-12-16 RX ORDER — DIAZEPAM 5 MG
10 TABLET ORAL
Refills: 0 | Status: DISCONTINUED | OUTPATIENT
Start: 2021-12-16 | End: 2021-12-17

## 2021-12-16 RX ORDER — ACETAMINOPHEN 500 MG
650 TABLET ORAL EVERY 6 HOURS
Refills: 0 | Status: DISCONTINUED | OUTPATIENT
Start: 2021-12-16 | End: 2021-12-17

## 2021-12-16 RX ORDER — ASPIRIN/CALCIUM CARB/MAGNESIUM 324 MG
81 TABLET ORAL DAILY
Refills: 0 | Status: DISCONTINUED | OUTPATIENT
Start: 2021-12-16 | End: 2021-12-17

## 2021-12-16 RX ORDER — THIAMINE MONONITRATE (VIT B1) 100 MG
100 TABLET ORAL DAILY
Refills: 0 | Status: DISCONTINUED | OUTPATIENT
Start: 2021-12-16 | End: 2021-12-17

## 2021-12-16 RX ORDER — FOLIC ACID 0.8 MG
1 TABLET ORAL DAILY
Refills: 0 | Status: DISCONTINUED | OUTPATIENT
Start: 2021-12-16 | End: 2021-12-17

## 2021-12-16 RX ORDER — PANTOPRAZOLE SODIUM 20 MG/1
40 TABLET, DELAYED RELEASE ORAL
Refills: 0 | Status: DISCONTINUED | OUTPATIENT
Start: 2021-12-16 | End: 2021-12-17

## 2021-12-16 RX ORDER — DIAZEPAM 5 MG
10 TABLET ORAL EVERY 8 HOURS
Refills: 0 | Status: DISCONTINUED | OUTPATIENT
Start: 2021-12-17 | End: 2021-12-17

## 2021-12-16 RX ORDER — POTASSIUM PHOSPHATE, MONOBASIC POTASSIUM PHOSPHATE, DIBASIC 236; 224 MG/ML; MG/ML
30 INJECTION, SOLUTION INTRAVENOUS ONCE
Refills: 0 | Status: COMPLETED | OUTPATIENT
Start: 2021-12-16 | End: 2021-12-16

## 2021-12-16 RX ORDER — ATORVASTATIN CALCIUM 80 MG/1
20 TABLET, FILM COATED ORAL AT BEDTIME
Refills: 0 | Status: DISCONTINUED | OUTPATIENT
Start: 2021-12-16 | End: 2021-12-17

## 2021-12-16 RX ORDER — DIAZEPAM 5 MG
20 TABLET ORAL EVERY 8 HOURS
Refills: 0 | Status: DISCONTINUED | OUTPATIENT
Start: 2021-12-16 | End: 2021-12-17

## 2021-12-16 RX ADMIN — Medication 650 MILLIGRAM(S): at 20:39

## 2021-12-16 RX ADMIN — Medication 650 MILLIGRAM(S): at 06:23

## 2021-12-16 RX ADMIN — Medication 100 MILLIGRAM(S): at 13:53

## 2021-12-16 RX ADMIN — Medication 650 MILLIGRAM(S): at 07:00

## 2021-12-16 RX ADMIN — Medication 81 MILLIGRAM(S): at 12:24

## 2021-12-16 RX ADMIN — Medication 20 MILLIGRAM(S): at 13:53

## 2021-12-16 RX ADMIN — ATORVASTATIN CALCIUM 20 MILLIGRAM(S): 80 TABLET, FILM COATED ORAL at 21:25

## 2021-12-16 RX ADMIN — SODIUM CHLORIDE 110 MILLILITER(S): 9 INJECTION, SOLUTION INTRAVENOUS at 06:23

## 2021-12-16 RX ADMIN — SODIUM CHLORIDE 110 MILLILITER(S): 9 INJECTION, SOLUTION INTRAVENOUS at 09:39

## 2021-12-16 RX ADMIN — Medication 1 TABLET(S): at 12:26

## 2021-12-16 RX ADMIN — PANTOPRAZOLE SODIUM 40 MILLIGRAM(S): 20 TABLET, DELAYED RELEASE ORAL at 06:23

## 2021-12-16 RX ADMIN — Medication 1 GRAM(S): at 17:10

## 2021-12-16 RX ADMIN — Medication 650 MILLIGRAM(S): at 21:30

## 2021-12-16 RX ADMIN — Medication 2 MILLIGRAM(S): at 03:25

## 2021-12-16 RX ADMIN — Medication 2 MILLIGRAM(S): at 09:39

## 2021-12-16 RX ADMIN — Medication 1 MILLIGRAM(S): at 12:26

## 2021-12-16 RX ADMIN — SODIUM CHLORIDE 110 MILLILITER(S): 9 INJECTION, SOLUTION INTRAVENOUS at 03:57

## 2021-12-16 NOTE — H&P ADULT - ASSESSMENT
Patient is a 56M with a PMH of chronic ETOH abuse with hx of alcohol withdrawal and DTs with frequent hospital admissions, alcoholic gastritis/esophagitis, PUD, HLD, hx of hypoxic respiratory failure requiring intubation due to aspiration PNA who presents to the ED for abdominal pain and vomiting.  Reports severe, diffuse abdominal pain.  States that he is unable to tolerate PO intake.  Reports his last drink was yesterday.   Patient has no other complaints.  Tachycardic in ED to 123, labs show lactic acidosis.  Will admit to tele.      IMPROVE VTE Individual Risk Assessment          RISK                                                          Points  [  ] Previous VTE                                                3  [  ] Thrombophilia                                             2  [  ] Lower limb paralysis                                    2        (unable to hold up >15 seconds)    [  ] Current Cancer                                             2         (within 6 months)  [  ] Immobilization > 24 hrs                              1  [  ] ICU/CCU stay > 24 hours                            1  [  ] Age > 60                                                    1    IMPROVE VTE Score - 1

## 2021-12-16 NOTE — PHARMACY COMMUNICATION NOTE - COMMENTS
Spoke to MD and clarified diazepam dose of 10mg iv prn q1h for CIWA score>8. Patient seizes with ativan per MD.

## 2021-12-16 NOTE — ED PROVIDER NOTE - OBJECTIVE STATEMENT
55 yo m hx ETOH use disorder, alcohol withdrawal with DT's, frequent hospitalizations, presenting with nausea, vomiting, abdominal pain. Last drink one day ago.

## 2021-12-16 NOTE — PATIENT PROFILE ADULT - FUNCTIONAL SCREEN CURRENT LEVEL: EATING, MLM
BHI Shift Note     Shift: Day    Meals: 100% of breakfast,   Sleep: Reports sleep as \"really good\"     Is the Patient Nabil for Safety: Yes If no, what intervention?: n/a    Thought Process: Circumstantial  Thought Content: Normal, WDL    Hallucinations: None Explain: n/a    Delusions: None Explain: n/a    Groups Attended: Celsa Blas encouraged to attend unit programming. Pt attended community/goals group and psych education group. Medication Compliant: Yes. PRNs Given: Tylenol body aches, administration was effective. Narrative: Celsa Blas was was calm and cooperative, she denied any issues with sleep and was alert and oriented x4.  She denies SI/HI and AVH 0 = independent

## 2021-12-16 NOTE — ED PROVIDER NOTE - PHYSICAL EXAMINATION
VITALS: reviewed  GEN: NAD, A & O x 4  HEAD/EYES: NCAT, EOMI, anicteric sclerae,   ENT: mucus membranes moist, oropharynx WNL, trachea midline,   RESP: lungs CTA with equal breath sounds bilaterally, chest wall nontender and atraumatic  CV: tachycardic with reg rhythm S1, S2, distal pulses intact and symmetric bilaterally  ABDOMEN:  soft, nondistended, nontender, no palpable masses  : no CVAT  MSK: extremities atraumatic and nontender, no edema, no asymmetry.   SKIN: warm, dry, no rash, no bruising, no cyanosis. color appropriate for ethnicity  NEURO: alert, mentating appropriately, no facial asymmetry. no tongue fasciculations, no tremors  PSYCH: Affect appropriate

## 2021-12-16 NOTE — H&P ADULT - NSHPLABSRESULTS_GEN_ALL_CORE
Recent Vitals  T(C): 36.6 (12-15-21 @ 16:57), Max: 36.6 (12-15-21 @ 16:57)  HR: 87 (12-15-21 @ 18:15) (87 - 123)  BP: 109/61 (12-15-21 @ 18:15) (109/61 - 157/87)  RR: 15 (12-15-21 @ 18:15) (15 - 22)  SpO2: 100% (12-15-21 @ 18:15) (98% - 100%)                        12.3   6.35  )-----------( 343      ( 15 Dec 2021 19:18 )             38.9     12-15    142  |  105  |  11  ----------------------------<  107<H>  3.9   |  25  |  1.10    Ca    8.6      15 Dec 2021 19:18  Phos  3.5     12-15  Mg     2.6     12-15    TPro  8.2  /  Alb  3.6  /  TBili  0.3  /  DBili  0.1  /  AST  34  /  ALT  21  /  AlkPhos  55  12-15      LIVER FUNCTIONS - ( 15 Dec 2021 19:18 )  Alb: 3.6 g/dL / Pro: 8.2 gm/dL / ALK PHOS: 55 U/L / ALT: 21 U/L / AST: 34 U/L / GGT: x               Home Medications:  acetaminophen 325 mg oral tablet: 2 tab(s) orally every 6 hours, As needed,  (18 Sep 2021 02:34)  aspirin 81 mg oral delayed release tablet: 1 tab(s) orally once a day (18 Sep 2021 02:34)

## 2021-12-16 NOTE — PHARMACOTHERAPY INTERVENTION NOTE - COMMENTS
Spoke to MD and recommended potassium phoshate iv due to borderline high sodium levels.  MD agreed to d/C sodium phoshate iv and change to potassium phophate iv. Potassium level is 3.5

## 2021-12-16 NOTE — H&P ADULT - HISTORY OF PRESENT ILLNESS
Patient is a 56M with a PMH of chronic ETOH abuse with hx of alcohol withdrawal and DTs with frequent hospital admissions, alcoholic gastritis/esophagitis, PUD, HLD, hx of hypoxic respiratory failure requiring intubation due to aspiration PNA who presents to the ED for abdominal pain and vomiting.  Reports severe, diffuse abdominal pain.  States that he is unable to tolerate PO intake.  Reports his last drink was yesterday.   Patient has no other complaints.  Tachycardic in ED to 123, labs show lactic acidosis.  Will admit to tele.

## 2021-12-16 NOTE — ED PROVIDER NOTE - CLINICAL SUMMARY MEDICAL DECISION MAKING FREE TEXT BOX
55 yo M presenting with alcohol withdrawal, abdominal pain n/v. abdominal exam non-focal. tba withdrawal

## 2021-12-16 NOTE — CHART NOTE - NSCHARTNOTEFT_GEN_A_CORE
pt taken over this AM.  long history, recurrent admissions for EtOH complications including withdrawal, frequently requiring ICU admission.  Changed etoh w/d treatment plan to tapering dosing of IV diazepam rather than ativan to see if this strategy proves more successful for the patient.  Changed thiamine to IV considering guidelines.  Added carafate to see if helps his epigastric pain.  would avoid narcotics while on high doses of benzodiazepines for pain, use tylenol.

## 2021-12-17 ENCOUNTER — TRANSCRIPTION ENCOUNTER (OUTPATIENT)
Age: 56
End: 2021-12-17

## 2021-12-17 VITALS
RESPIRATION RATE: 18 BRPM | TEMPERATURE: 98 F | DIASTOLIC BLOOD PRESSURE: 87 MMHG | OXYGEN SATURATION: 99 % | HEART RATE: 84 BPM | SYSTOLIC BLOOD PRESSURE: 129 MMHG

## 2021-12-17 LAB
ALBUMIN SERPL ELPH-MCNC: 2.9 G/DL — LOW (ref 3.3–5)
ALP SERPL-CCNC: 56 U/L — SIGNIFICANT CHANGE UP (ref 40–120)
ALT FLD-CCNC: 17 U/L — SIGNIFICANT CHANGE UP (ref 12–78)
ANION GAP SERPL CALC-SCNC: 9 MMOL/L — SIGNIFICANT CHANGE UP (ref 5–17)
AST SERPL-CCNC: 21 U/L — SIGNIFICANT CHANGE UP (ref 15–37)
BILIRUB SERPL-MCNC: 1.2 MG/DL — SIGNIFICANT CHANGE UP (ref 0.2–1.2)
BUN SERPL-MCNC: 6 MG/DL — LOW (ref 7–23)
CALCIUM SERPL-MCNC: 8.3 MG/DL — LOW (ref 8.5–10.1)
CHLORIDE SERPL-SCNC: 106 MMOL/L — SIGNIFICANT CHANGE UP (ref 96–108)
CO2 SERPL-SCNC: 23 MMOL/L — SIGNIFICANT CHANGE UP (ref 22–31)
CREAT SERPL-MCNC: 0.95 MG/DL — SIGNIFICANT CHANGE UP (ref 0.5–1.3)
GLUCOSE BLDC GLUCOMTR-MCNC: 99 MG/DL — SIGNIFICANT CHANGE UP (ref 70–99)
GLUCOSE SERPL-MCNC: 89 MG/DL — SIGNIFICANT CHANGE UP (ref 70–99)
HCT VFR BLD CALC: 33.3 % — LOW (ref 39–50)
HGB BLD-MCNC: 10.3 G/DL — LOW (ref 13–17)
MAGNESIUM SERPL-MCNC: 2.1 MG/DL — SIGNIFICANT CHANGE UP (ref 1.6–2.6)
MCHC RBC-ENTMCNC: 23.2 PG — LOW (ref 27–34)
MCHC RBC-ENTMCNC: 30.9 GM/DL — LOW (ref 32–36)
MCV RBC AUTO: 75 FL — LOW (ref 80–100)
NRBC # BLD: 0 /100 WBCS — SIGNIFICANT CHANGE UP (ref 0–0)
PHOSPHATE SERPL-MCNC: 2.4 MG/DL — LOW (ref 2.5–4.5)
PLATELET # BLD AUTO: 258 K/UL — SIGNIFICANT CHANGE UP (ref 150–400)
POTASSIUM SERPL-MCNC: 3.6 MMOL/L — SIGNIFICANT CHANGE UP (ref 3.5–5.3)
POTASSIUM SERPL-SCNC: 3.6 MMOL/L — SIGNIFICANT CHANGE UP (ref 3.5–5.3)
PROT SERPL-MCNC: 7 GM/DL — SIGNIFICANT CHANGE UP (ref 6–8.3)
RBC # BLD: 4.44 M/UL — SIGNIFICANT CHANGE UP (ref 4.2–5.8)
RBC # FLD: 21.6 % — HIGH (ref 10.3–14.5)
SODIUM SERPL-SCNC: 138 MMOL/L — SIGNIFICANT CHANGE UP (ref 135–145)
WBC # BLD: 3.49 K/UL — LOW (ref 3.8–10.5)
WBC # FLD AUTO: 3.49 K/UL — LOW (ref 3.8–10.5)

## 2021-12-17 PROCEDURE — 99239 HOSP IP/OBS DSCHRG MGMT >30: CPT

## 2021-12-17 RX ORDER — POTASSIUM CHLORIDE 20 MEQ
40 PACKET (EA) ORAL ONCE
Refills: 0 | Status: DISCONTINUED | OUTPATIENT
Start: 2021-12-17 | End: 2021-12-17

## 2021-12-17 RX ORDER — ASPIRIN/CALCIUM CARB/MAGNESIUM 324 MG
1 TABLET ORAL
Qty: 0 | Refills: 0 | DISCHARGE

## 2021-12-17 RX ORDER — ENOXAPARIN SODIUM 100 MG/ML
40 INJECTION SUBCUTANEOUS DAILY
Refills: 0 | Status: DISCONTINUED | OUTPATIENT
Start: 2021-12-17 | End: 2021-12-17

## 2021-12-17 RX ORDER — FERROUS SULFATE 325(65) MG
300 TABLET ORAL DAILY
Refills: 0 | Status: DISCONTINUED | OUTPATIENT
Start: 2021-12-18 | End: 2021-12-17

## 2021-12-17 RX ORDER — DIAZEPAM 5 MG
5 TABLET ORAL THREE TIMES A DAY
Refills: 0 | Status: CANCELLED | OUTPATIENT
Start: 2021-12-19 | End: 2021-12-17

## 2021-12-17 RX ORDER — THIAMINE MONONITRATE (VIT B1) 100 MG
100 TABLET ORAL DAILY
Refills: 0 | Status: DISCONTINUED | OUTPATIENT
Start: 2021-12-17 | End: 2021-12-17

## 2021-12-17 RX ORDER — DIAZEPAM 5 MG
10 TABLET ORAL THREE TIMES A DAY
Refills: 0 | Status: DISCONTINUED | OUTPATIENT
Start: 2021-12-18 | End: 2021-12-17

## 2021-12-17 RX ORDER — SUCRALFATE 1 G
1 TABLET ORAL
Qty: 56 | Refills: 0
Start: 2021-12-17 | End: 2021-12-30

## 2021-12-17 RX ORDER — DIAZEPAM 5 MG
20 TABLET ORAL
Refills: 0 | Status: COMPLETED | OUTPATIENT
Start: 2021-12-17 | End: 2021-12-17

## 2021-12-17 RX ORDER — FERROUS SULFATE 325(65) MG
TABLET ORAL
Refills: 0 | Status: DISCONTINUED | OUTPATIENT
Start: 2021-12-17 | End: 2021-12-17

## 2021-12-17 RX ORDER — SODIUM,POTASSIUM PHOSPHATES 278-250MG
2 POWDER IN PACKET (EA) ORAL
Refills: 0 | Status: COMPLETED | OUTPATIENT
Start: 2021-12-17 | End: 2021-12-17

## 2021-12-17 RX ORDER — DIAZEPAM 5 MG
10 TABLET ORAL EVERY 4 HOURS
Refills: 0 | Status: DISCONTINUED | OUTPATIENT
Start: 2021-12-17 | End: 2021-12-17

## 2021-12-17 RX ORDER — FERROUS SULFATE 325(65) MG
300 TABLET ORAL ONCE
Refills: 0 | Status: COMPLETED | OUTPATIENT
Start: 2021-12-17 | End: 2021-12-17

## 2021-12-17 RX ADMIN — ENOXAPARIN SODIUM 40 MILLIGRAM(S): 100 INJECTION SUBCUTANEOUS at 12:40

## 2021-12-17 RX ADMIN — Medication 300 MILLIGRAM(S): at 17:02

## 2021-12-17 RX ADMIN — PANTOPRAZOLE SODIUM 40 MILLIGRAM(S): 20 TABLET, DELAYED RELEASE ORAL at 05:37

## 2021-12-17 RX ADMIN — Medication 1 GRAM(S): at 05:36

## 2021-12-17 RX ADMIN — Medication 1 TABLET(S): at 12:40

## 2021-12-17 RX ADMIN — Medication 2 TABLET(S): at 12:40

## 2021-12-17 RX ADMIN — Medication 1 GRAM(S): at 01:08

## 2021-12-17 RX ADMIN — Medication 650 MILLIGRAM(S): at 10:04

## 2021-12-17 RX ADMIN — Medication 2 TABLET(S): at 10:10

## 2021-12-17 RX ADMIN — Medication 1 GRAM(S): at 12:49

## 2021-12-17 RX ADMIN — Medication 20 MILLIGRAM(S): at 10:03

## 2021-12-17 RX ADMIN — Medication 1 GRAM(S): at 17:02

## 2021-12-17 RX ADMIN — Medication 1 MILLIGRAM(S): at 12:40

## 2021-12-17 RX ADMIN — Medication 100 MILLIGRAM(S): at 12:41

## 2021-12-17 NOTE — PROGRESS NOTE ADULT - ASSESSMENT
56 YOM w/ long standing Etoh abuse with multiple presentations with severe w/d including ICU admission who presents with n/v/abd pain, likely due to alcohol.    alcohol dependence with abuse: at high risk for withdrawal.  benzo taper with diazepam, with PRN diazepam for breakthrough CIWA of 8 or greater.  right now CIWAs sitting below this.    likely alcohol gastritis: trouble tolerating PO.  continue IVF, protonix, carafate.  add maalox, send h.pylori stool ag.  tylenol only for pain.    anemia, likely due to iron deficiency / malnutrition.  order iron qod by mouth.  occult blood.    hypophosphatemia: replete    dvt prophylaxis: lovenox    dispo: DC once able to tolerate PO, depending on status of etoh w/d

## 2021-12-17 NOTE — DISCHARGE NOTE NURSING/CASE MANAGEMENT/SOCIAL WORK - NSDCPEFALRISK_GEN_ALL_CORE
For information on Fall & Injury Prevention, visit: [email or web address] OR  [email or web address] OR  https://www.cdc.gov/steadi/patient.html

## 2021-12-17 NOTE — DISCHARGE NOTE NURSING/CASE MANAGEMENT/SOCIAL WORK - NSDCVIVACCINE_GEN_ALL_CORE_FT
COVID-19, mRNA, LNP-S, PF, 100 mcg/ 0.5 mL dose (Moderna); 10-Jovan-2021 15:38; Reji Hernandez (RN); [employer] [id number] (Exp. Date: 13-Jul-2021); IntraMuscular; Deltoid Right.; 0.5 milliLiter(s);   influenza, injectable, quadrivalent, preservative free; 31-Jan-2019 15:53; Ayaka Bynum (RN); [employer]; [id number] (Exp. Date: 30-Jun-2019); IntraMuscular; Deltoid Right.; 0.5 milliLiter(s); VIS (VIS Published: 07-Aug-2015, VIS Presented: 31-Jan-2019);   influenza, injectable, quadrivalent, preservative free; 10-Nov-2019 14:13; Laverne Lane (RN); [employer]; [id number] (Exp. Date: 30-Jun-2020); IntraMuscular; Deltoid Left.; 0.5 milliLiter(s); VIS (VIS Published: 15-Aug-2019, VIS Presented: 10-Nov-2019);   influenza, injectable, quadrivalent, preservative free; 13-Oct-2020 11:56; Kimberli Cronin (RN); [employer]; [id number] (Exp. Date: 30-Jun-2021); IntraMuscular; Deltoid Right.; 0.5 milliLiter(s); VIS (VIS Published: 15-Aug-2019, VIS Presented: 13-Oct-2020);   Td (adult) preservative free; 18-Jan-2020 20:04; Yulia Vance (RN); [employer]; [id number] (Exp. Date: 19-Jan-2021); IntraMuscular; Deltoid Right.; 0.5 milliLiter(s); VIS (VIS Published: 18-Jan-2020, VIS Presented: 18-Jan-2020);

## 2021-12-17 NOTE — DISCHARGE NOTE PROVIDER - NSDCMRMEDTOKEN_GEN_ALL_CORE_FT
acetaminophen 325 mg oral tablet: 2 tab(s) orally every 6 hours, As needed,   atorvastatin 20 mg oral tablet: 1 tab(s) orally once a day  folic acid 1 mg oral tablet: 1 tab(s) orally once a day  Multiple Vitamins oral tablet: 1 tab(s) orally once a day  Protonix 40 mg oral delayed release tablet: 1 tab(s) orally 2 times a day   sucralfate 1 g oral tablet: 1 tab(s) orally 4 times a day  thiamine 100 mg oral tablet: 1 tab(s) orally once a day

## 2021-12-17 NOTE — DISCHARGE NOTE NURSING/CASE MANAGEMENT/SOCIAL WORK - PATIENT PORTAL LINK FT
You can access the FollowMyHealth Patient Portal offered by [facility] by registering at the following website: [email or web address]. By joining [employer]’s FollowMyHealth portal, you will also be able to view your health information using other applications (apps) compatible with our system.

## 2021-12-17 NOTE — PROGRESS NOTE ADULT - SUBJECTIVE AND OBJECTIVE BOX
pt notes that he has ongoing abdominal pain, worse with eating.  causing him to take minimal PO.  We again discussed that the likely etiology of his symptoms is related to his alcohol use.    o.  Vital Signs Last 24 Hrs  T(C): 37.1 (17 Dec 2021 11:00), Max: 37.1 (17 Dec 2021 11:00)  T(F): 98.8 (17 Dec 2021 11:00), Max: 98.8 (17 Dec 2021 11:00)  HR: 85 (17 Dec 2021 11:00) (67 - 85)  BP: 131/87 (17 Dec 2021 11:00) (130/80 - 159/95)  BP(mean): --  RR: 18 (17 Dec 2021 11:00) (17 - 18)  SpO2: 94% (17 Dec 2021 11:00) (94% - 99%)    gen nad  lungs clear  cv rrr no mrg, ext warm well perfused  abd epigastric tenderness, no rebound nor guarding  skin no breakdown  psyc aaox3    labs reviewed, stable anemia, low phos

## 2021-12-17 NOTE — DISCHARGE NOTE PROVIDER - HOSPITAL COURSE
56 YOM w/ long standing Etoh abuse with multiple presentations with severe w/d including ICU admission who presents with n/v/abd pain, likely due to alcohol gastritis.  alcohol level on presentation >200.  conservatively managed with diazepam for withdrawal prevention, tylenol, carafate, and protonix for the gastritis.  he was able to tolerate a diet.  he asked to go home, which is reasonable considering the improvement in his abdominal symptoms with no further nausea, vomiting, and improvement in pain.

## 2021-12-17 NOTE — DISCHARGE NOTE PROVIDER - NSDCCPCAREPLAN_GEN_ALL_CORE_FT
PRINCIPAL DISCHARGE DIAGNOSIS  Diagnosis: Alcoholic gastritis  Assessment and Plan of Treatment: your abdominal pain is very likely related to alcohol use.  please stop drinking.  be sure to take your protonix, as well as take the prescribed carafate which will help your stomach feel better.  do not use apritin, ibuprofen, or other NSAIDs for pain, use tylenol at safe doses.  see your doctor in 1 week for follow up of this issue.      SECONDARY DISCHARGE DIAGNOSES  Diagnosis: Alcohol abuse  Assessment and Plan of Treatment: you have a longstanding history of alcohol abuse.  this is significantly hurting your health.  we recommend obtaining alcohol counseling and follow up.  call your doctor or return to a hospital if you develop worsening symptoms of withdrawal.

## 2021-12-23 ENCOUNTER — INPATIENT (INPATIENT)
Facility: HOSPITAL | Age: 56
LOS: 0 days | Discharge: HOME | End: 2021-12-24
Attending: HOSPITALIST | Admitting: HOSPITALIST
Payer: MEDICAID

## 2021-12-23 VITALS
HEIGHT: 67 IN | DIASTOLIC BLOOD PRESSURE: 99 MMHG | WEIGHT: 164.91 LBS | OXYGEN SATURATION: 100 % | SYSTOLIC BLOOD PRESSURE: 148 MMHG | HEART RATE: 101 BPM | RESPIRATION RATE: 19 BRPM | TEMPERATURE: 98 F

## 2021-12-23 DIAGNOSIS — R10.9 UNSPECIFIED ABDOMINAL PAIN: ICD-10-CM

## 2021-12-23 DIAGNOSIS — D50.8 OTHER IRON DEFICIENCY ANEMIAS: ICD-10-CM

## 2021-12-23 DIAGNOSIS — E83.39 OTHER DISORDERS OF PHOSPHORUS METABOLISM: ICD-10-CM

## 2021-12-23 DIAGNOSIS — K29.20 ALCOHOLIC GASTRITIS WITHOUT BLEEDING: ICD-10-CM

## 2021-12-23 DIAGNOSIS — F10.10 ALCOHOL ABUSE, UNCOMPLICATED: ICD-10-CM

## 2021-12-23 DIAGNOSIS — E87.2 ACIDOSIS: ICD-10-CM

## 2021-12-23 LAB
ALBUMIN SERPL ELPH-MCNC: 3.8 G/DL — SIGNIFICANT CHANGE UP (ref 3.3–5)
ALP SERPL-CCNC: 64 U/L — SIGNIFICANT CHANGE UP (ref 40–120)
ALT FLD-CCNC: 19 U/L — SIGNIFICANT CHANGE UP (ref 12–78)
ANION GAP SERPL CALC-SCNC: 16 MMOL/L — SIGNIFICANT CHANGE UP (ref 5–17)
ANISOCYTOSIS BLD QL: SLIGHT — SIGNIFICANT CHANGE UP
AST SERPL-CCNC: 35 U/L — SIGNIFICANT CHANGE UP (ref 15–37)
BASOPHILS # BLD AUTO: 0.05 K/UL — SIGNIFICANT CHANGE UP (ref 0–0.2)
BASOPHILS NFR BLD AUTO: 1.4 % — SIGNIFICANT CHANGE UP (ref 0–2)
BILIRUB DIRECT SERPL-MCNC: 0.1 MG/DL — SIGNIFICANT CHANGE UP (ref 0–0.3)
BILIRUB SERPL-MCNC: 0.4 MG/DL — SIGNIFICANT CHANGE UP (ref 0.2–1.2)
BUN SERPL-MCNC: 12 MG/DL — SIGNIFICANT CHANGE UP (ref 7–23)
CALCIUM SERPL-MCNC: 8.9 MG/DL — SIGNIFICANT CHANGE UP (ref 8.5–10.1)
CHLORIDE SERPL-SCNC: 102 MMOL/L — SIGNIFICANT CHANGE UP (ref 96–108)
CO2 SERPL-SCNC: 21 MMOL/L — LOW (ref 22–31)
CREAT SERPL-MCNC: 1.06 MG/DL — SIGNIFICANT CHANGE UP (ref 0.5–1.3)
EOSINOPHIL # BLD AUTO: 0.14 K/UL — SIGNIFICANT CHANGE UP (ref 0–0.5)
EOSINOPHIL NFR BLD AUTO: 3.8 % — SIGNIFICANT CHANGE UP (ref 0–6)
ETHANOL SERPL-MCNC: 336 MG/DL — HIGH (ref 0–10)
GLUCOSE SERPL-MCNC: 78 MG/DL — SIGNIFICANT CHANGE UP (ref 70–99)
HCT VFR BLD CALC: 38.8 % — LOW (ref 39–50)
HGB BLD-MCNC: 12.3 G/DL — LOW (ref 13–17)
IMM GRANULOCYTES NFR BLD AUTO: 0.3 % — SIGNIFICANT CHANGE UP (ref 0–1.5)
LYMPHOCYTES # BLD AUTO: 2.15 K/UL — SIGNIFICANT CHANGE UP (ref 1–3.3)
LYMPHOCYTES # BLD AUTO: 58.4 % — HIGH (ref 13–44)
MAGNESIUM SERPL-MCNC: 2.6 MG/DL — SIGNIFICANT CHANGE UP (ref 1.6–2.6)
MANUAL SMEAR VERIFICATION: SIGNIFICANT CHANGE UP
MCHC RBC-ENTMCNC: 23.4 PG — LOW (ref 27–34)
MCHC RBC-ENTMCNC: 31.7 GM/DL — LOW (ref 32–36)
MCV RBC AUTO: 73.9 FL — LOW (ref 80–100)
MICROCYTES BLD QL: SLIGHT — SIGNIFICANT CHANGE UP
MONOCYTES # BLD AUTO: 0.28 K/UL — SIGNIFICANT CHANGE UP (ref 0–0.9)
MONOCYTES NFR BLD AUTO: 7.6 % — SIGNIFICANT CHANGE UP (ref 2–14)
NEUTROPHILS # BLD AUTO: 1.05 K/UL — LOW (ref 1.8–7.4)
NEUTROPHILS NFR BLD AUTO: 28.5 % — LOW (ref 43–77)
NRBC # BLD: 0 /100 WBCS — SIGNIFICANT CHANGE UP (ref 0–0)
PHOSPHATE SERPL-MCNC: 2.3 MG/DL — LOW (ref 2.5–4.5)
PLAT MORPH BLD: NORMAL — SIGNIFICANT CHANGE UP
PLATELET # BLD AUTO: 226 K/UL — SIGNIFICANT CHANGE UP (ref 150–400)
POTASSIUM SERPL-MCNC: 3.6 MMOL/L — SIGNIFICANT CHANGE UP (ref 3.5–5.3)
POTASSIUM SERPL-SCNC: 3.6 MMOL/L — SIGNIFICANT CHANGE UP (ref 3.5–5.3)
PROT SERPL-MCNC: 8.6 GM/DL — HIGH (ref 6–8.3)
RBC # BLD: 5.25 M/UL — SIGNIFICANT CHANGE UP (ref 4.2–5.8)
RBC # FLD: 23.4 % — HIGH (ref 10.3–14.5)
RBC BLD AUTO: ABNORMAL
SODIUM SERPL-SCNC: 139 MMOL/L — SIGNIFICANT CHANGE UP (ref 135–145)
WBC # BLD: 3.68 K/UL — LOW (ref 3.8–10.5)
WBC # FLD AUTO: 3.68 K/UL — LOW (ref 3.8–10.5)

## 2021-12-23 PROCEDURE — 99285 EMERGENCY DEPT VISIT HI MDM: CPT

## 2021-12-23 RX ORDER — FAMOTIDINE 10 MG/ML
20 INJECTION INTRAVENOUS ONCE
Refills: 0 | Status: COMPLETED | OUTPATIENT
Start: 2021-12-23 | End: 2021-12-23

## 2021-12-23 RX ORDER — SODIUM CHLORIDE 9 MG/ML
1000 INJECTION INTRAMUSCULAR; INTRAVENOUS; SUBCUTANEOUS ONCE
Refills: 0 | Status: COMPLETED | OUTPATIENT
Start: 2021-12-23 | End: 2021-12-23

## 2021-12-23 RX ORDER — HYDROMORPHONE HYDROCHLORIDE 2 MG/ML
0.5 INJECTION INTRAMUSCULAR; INTRAVENOUS; SUBCUTANEOUS ONCE
Refills: 0 | Status: DISCONTINUED | OUTPATIENT
Start: 2021-12-23 | End: 2021-12-23

## 2021-12-23 RX ADMIN — Medication 30 MILLILITER(S): at 20:29

## 2021-12-23 RX ADMIN — Medication 2 MILLIGRAM(S): at 19:54

## 2021-12-23 RX ADMIN — Medication 50 MILLIGRAM(S): at 20:29

## 2021-12-23 RX ADMIN — SODIUM CHLORIDE 1000 MILLILITER(S): 9 INJECTION INTRAMUSCULAR; INTRAVENOUS; SUBCUTANEOUS at 21:28

## 2021-12-23 RX ADMIN — HYDROMORPHONE HYDROCHLORIDE 0.5 MILLIGRAM(S): 2 INJECTION INTRAMUSCULAR; INTRAVENOUS; SUBCUTANEOUS at 23:48

## 2021-12-23 RX ADMIN — SODIUM CHLORIDE 1000 MILLILITER(S): 9 INJECTION INTRAMUSCULAR; INTRAVENOUS; SUBCUTANEOUS at 19:54

## 2021-12-23 RX ADMIN — FAMOTIDINE 20 MILLIGRAM(S): 10 INJECTION INTRAVENOUS at 20:29

## 2021-12-23 NOTE — ED ADULT NURSE NOTE - NSIMPLEMENTINTERV_GEN_ALL_ED
Implemented All Universal Safety Interventions:  [place] to call system. Call bell, personal items and telephone within reach. Instruct patient to call for assistance. Room bathroom lighting operational. Non-slip footwear when patient is off stretcher. Physically safe environment: no spills, clutter or unnecessary equipment. Stretcher in lowest position, wheels locked, appropriate side rails in place.

## 2021-12-23 NOTE — ED PROVIDER NOTE - OBJECTIVE STATEMENT
56M w/ long standing Etoh abuse s/p multiple presentations with severe w/d including ICU admission who presents with complaint of "too much pain", points to abdomen and states "whole body". Pt a poor historian who has difficulty further articulating his chief complaint. denies recent etoh use (known to frequently deny etoh abuse however).

## 2021-12-23 NOTE — ED PROVIDER NOTE - PHYSICAL EXAMINATION
Constitutional: Discheveled appearing, awake, alert, oriented to person, place, time/situation and in mild/moderate distress.  ENMT: Airway patent.  Eyes: Clear bilaterally, pupils equal, round and reactive to light.  Cardiac: Tachycardic, regular rhythm.  Heart sounds S1, S2.  Respiratory: Breath sounds clear and equal bilaterally.  Gastrointestinal: Abdomen soft, non-tender, no guarding.  Neurological: Alert and oriented, no focal deficits, no motor or sensory deficits.  Skin: No evidence of rash.

## 2021-12-23 NOTE — ED PROVIDER NOTE - CLINICAL SUMMARY MEDICAL DECISION MAKING FREE TEXT BOX
frequent presentation for withdrawal back with usual host of complaints including "too much pain" in his body. No tremors but pt anxious appearing and slightly tachycardic. Will attempt to control symptoms in ED tho pt may require admission for etoh withdrawal as is often the case.

## 2021-12-23 NOTE — ED ADULT TRIAGE NOTE - CHIEF COMPLAINT QUOTE
Medicare Wellness Visit  Plan for Preventive Care    A good way for you to stay healthy is to use preventive care.  Medicare covers many services that can help you stay healthy.* The goal of these services is to find any health problems as quickly as possible. Finding problems early can help make them easier to treat.  Your personal plan below lists the services you may need and when they are due.     Health Maintenance Summary     DTaP/Tdap/Td Vaccine (1 - Tdap)  Overdue since 5/3/1962    Shingles Vaccine (1 of 2)  Overdue since 5/3/1993    Influenza Vaccine (1)  Overdue since 9/1/2020    Medicare Wellness Visit (Yearly)  Due soon on 1/20/2021    Lung Cancer Screening (Yearly)  Due soon on 2/3/2021    Depression Screening (Yearly)  Next due on 1/14/2022    Pneumococcal Vaccine 65+   Completed    Meningococcal Vaccine   Aged Out    HPV Vaccine   Aged Out           Preventive Care for Women and Men    Heart Screenings (Cardiovascular):  · Blood tests are used to check your cholesterol, lipid and triglyceride levels. High levels can increase your risk for heart disease and stroke. High levels can be treated with medications, diet and exercise. Lowering your levels can help keep your heart and blood vessels healthy.  Your provider will order these tests if they are needed.    · An ultrasound is done to see if you have an abdominal aortic aneurysm (AAA).  This is an enlargement of one of the main blood vessels that delivers blood to the body.   In the [place], 9,000 deaths are caused by AAA.  You may not even know you have this problem and as many as 1 in 3 people will have a serious problem if it is not treated.  Early diagnosis allows for more effective treatment and cure.  If you have a family history of AAA or are a male age 65-75 who has smoked, you are at higher risk of an AAA.  Your provider can order this test, if needed.    Colorectal Screening:  · There are many tests that are used to check for cancer  of your colon and rectum. You and your provider should discuss what test is best for you and when to have it done.  Options include:  · Screening Colonoscopy: exam of the entire colon, seen through a flexible lighted tube.  · Flexible Sigmoidoscopy: exam of the last third (sigmoid portion) of the colon and rectum, seen through a flexible lighted tube.  · Cologuard DNA stool test: a sample of your stool is used to screen for cancer and unseen blood in your stool.  · Fecal Occult Blood Test: a sample of your stool is studied to find any unseen blood    Flu Shot:  · An immunization that helps to prevent influenza (the flu). You should get this every year. The best time to get the shot is in the fall.    Pneumococcal Shot:  • Vaccines are available that can help prevent pneumococcal disease, which is any type of infection caused by Streptococcus pneumoniae bacteria.   Their use can prevent some cases of pneumonia, meningitis, and sepsis. There are two types of pneumococcal vaccines:   o Conjugate vaccines (PCV-13 or Prevnar 13®) - helps protect against the 13 types of pneumococcal bacteria that are the most common causes of serious infections in children and adults.    o Polysaccharide vaccine (PPSV23 or Hgfxjfign03®) - helps protect against 23 types of pneumococcal bacteria for patients who are recommended to get it.  These vaccines should be given at least 12 months apart.  A booster is usually not needed.     Hepatitis B Shot:  · An immunization that helps to protect people from getting Hepatitis B. Hepatitis B is a virus that spreads through contact with infected blood or body fluids. Many people with the virus do not have symptoms.  The virus can lead to serious problems, such as liver disease. Some people are at higher risk than others. Your doctor will tell you if you need this shot.     Diabetes Screening:  · A test to measure sugar (glucose) in your blood is called a fasting blood sugar. Fasting means you  cannot have food or drink for at least 8 hours before the test. This test can detect diabetes long before you may notice symptoms.    Glaucoma Screening:  · Glaucoma screening is performed by your eye doctor. The test measures the fluid pressure inside your eyes to determine if you have glaucoma.     Hepatitis C Screening:  · A blood test to see if you have the hepatitis C virus.  Hepatitis C attacks the liver and is a major cause of chronic liver disease.  Medicare will cover a single screening for all adults born between 1945 & 1965, or high risk patients (people who have injected illegal drugs or people who have had blood transfusions).  High risk patients who continue to inject illegal drugs can be screened for Hepatitis C every year.    Smoking and Tobacco-Use Cessation Counseling:  · Tobacco is the single greatest cause of disease and early death in our country today. Medication and counseling together can increase a person’s chance of quitting for good.   · Medicare covers two quitting attempts per year, with four counseling sessions per attempt (eight sessions in a 12 month period)    Preventive Screening tests for Women    Screening Mammograms and Breast Exams:  · An x-ray of your breasts to check for breast cancer before you or your doctor may be able to feel it.  If breast cancer is found early it can usually be treated with success.    Pelvic Exams and Pap Tests:  · An exam to check for cervical and vaginal cancer. A Pap test is a lab test in which cells are taken from your cervix and sent to the lab to look for signs of cervical cancer. If cancer of the cervix is found early, chances for a cure are good. Testing can generally end at age 65, or if a woman has a hysterectomy for a benign condition. Your provider may recommend more frequent testing if certain abnormal results are found.    Bone Mass Measurements:  · A painless x-ray of your bone density to see if you are at risk for a broken bone. Bone  density refers to the thickness of bones or how tightly the bone tissue is packed.    Preventive Screening tests for Men    Prostate Screening:  · Should you have a prostate cancer test (PSA)?  It is up to you to decide if you want a prostate cancer test. Talk to your clinician to find out if the test is right for you.  Things for you to consider and talk about should include:  · Benefits and harms of the test  · Your family history  · How your race/ethnicity may influence the test  · If the test may impact other medical conditions you have  · Your values on screenings and treatments    *Medicare pays for many preventive services to keep you healthy. For some of these services, you might have to pay a deductible, coinsurance, and / or copayment.  The amounts vary depending on the type of services you need and the kind of Medicare health plan you have.               Kamran malagon from home for ETOH  vodka

## 2021-12-23 NOTE — ED ADULT NURSE NOTE - OBJECTIVE STATEMENT
57 y/o male PMHx etoh abuse presents to the ED with abd pain, n/d, h/a last drink 3-4 days ago, + tremors

## 2021-12-23 NOTE — SBIRT NOTE ADULT - NSSBIRTUNABLESCR_GEN_A_CORE
Insists he drink very little and the alcohol isn't the reason he comes in with abdominal pain often. States it is his medication that he takes daily that has negative impact on his stomach. Not receptive to conversation about alcohol cessation, as he is not admitting it is an issue./Patient uncooperative

## 2021-12-24 ENCOUNTER — TRANSCRIPTION ENCOUNTER (OUTPATIENT)
Age: 56
End: 2021-12-24

## 2021-12-24 VITALS
RESPIRATION RATE: 18 BRPM | SYSTOLIC BLOOD PRESSURE: 120 MMHG | HEART RATE: 101 BPM | OXYGEN SATURATION: 98 % | TEMPERATURE: 98 F | DIASTOLIC BLOOD PRESSURE: 82 MMHG

## 2021-12-24 LAB
ALBUMIN SERPL ELPH-MCNC: 3.5 G/DL — SIGNIFICANT CHANGE UP (ref 3.3–5)
ALP SERPL-CCNC: 56 U/L — SIGNIFICANT CHANGE UP (ref 40–120)
ALT FLD-CCNC: 23 U/L — SIGNIFICANT CHANGE UP (ref 12–78)
ANION GAP SERPL CALC-SCNC: 11 MMOL/L — SIGNIFICANT CHANGE UP (ref 5–17)
AST SERPL-CCNC: 45 U/L — HIGH (ref 15–37)
BILIRUB SERPL-MCNC: 0.7 MG/DL — SIGNIFICANT CHANGE UP (ref 0.2–1.2)
BUN SERPL-MCNC: 12 MG/DL — SIGNIFICANT CHANGE UP (ref 7–23)
CALCIUM SERPL-MCNC: 8.3 MG/DL — LOW (ref 8.5–10.1)
CHLORIDE SERPL-SCNC: 110 MMOL/L — HIGH (ref 96–108)
CO2 SERPL-SCNC: 21 MMOL/L — LOW (ref 22–31)
CREAT SERPL-MCNC: 0.91 MG/DL — SIGNIFICANT CHANGE UP (ref 0.5–1.3)
FLUAV AG NPH QL: SIGNIFICANT CHANGE UP
FLUBV AG NPH QL: SIGNIFICANT CHANGE UP
GLUCOSE SERPL-MCNC: 87 MG/DL — SIGNIFICANT CHANGE UP (ref 70–99)
HCT VFR BLD CALC: 37.9 % — LOW (ref 39–50)
HGB BLD-MCNC: 11.8 G/DL — LOW (ref 13–17)
MAGNESIUM SERPL-MCNC: 1.8 MG/DL — SIGNIFICANT CHANGE UP (ref 1.6–2.6)
MCHC RBC-ENTMCNC: 23.4 PG — LOW (ref 27–34)
MCHC RBC-ENTMCNC: 31.1 GM/DL — LOW (ref 32–36)
MCV RBC AUTO: 75.2 FL — LOW (ref 80–100)
NRBC # BLD: 0 /100 WBCS — SIGNIFICANT CHANGE UP (ref 0–0)
PHOSPHATE SERPL-MCNC: 1.8 MG/DL — LOW (ref 2.5–4.5)
PLATELET # BLD AUTO: 171 K/UL — SIGNIFICANT CHANGE UP (ref 150–400)
POTASSIUM SERPL-MCNC: 4.1 MMOL/L — SIGNIFICANT CHANGE UP (ref 3.5–5.3)
POTASSIUM SERPL-SCNC: 4.1 MMOL/L — SIGNIFICANT CHANGE UP (ref 3.5–5.3)
PROT SERPL-MCNC: 7.8 GM/DL — SIGNIFICANT CHANGE UP (ref 6–8.3)
RBC # BLD: 5.04 M/UL — SIGNIFICANT CHANGE UP (ref 4.2–5.8)
RBC # FLD: 23.5 % — HIGH (ref 10.3–14.5)
SARS-COV-2 RNA SPEC QL NAA+PROBE: SIGNIFICANT CHANGE UP
SODIUM SERPL-SCNC: 142 MMOL/L — SIGNIFICANT CHANGE UP (ref 135–145)
WBC # BLD: 2.47 K/UL — LOW (ref 3.8–10.5)
WBC # FLD AUTO: 2.47 K/UL — LOW (ref 3.8–10.5)

## 2021-12-24 PROCEDURE — 99239 HOSP IP/OBS DSCHRG MGMT >30: CPT

## 2021-12-24 PROCEDURE — 99222 1ST HOSP IP/OBS MODERATE 55: CPT

## 2021-12-24 RX ORDER — ATORVASTATIN CALCIUM 80 MG/1
1 TABLET, FILM COATED ORAL
Qty: 0 | Refills: 0 | DISCHARGE
Start: 2021-12-24

## 2021-12-24 RX ORDER — ACETAMINOPHEN 500 MG
650 TABLET ORAL EVERY 6 HOURS
Refills: 0 | Status: DISCONTINUED | OUTPATIENT
Start: 2021-12-24 | End: 2021-12-24

## 2021-12-24 RX ORDER — ATORVASTATIN CALCIUM 80 MG/1
20 TABLET, FILM COATED ORAL AT BEDTIME
Refills: 0 | Status: DISCONTINUED | OUTPATIENT
Start: 2021-12-24 | End: 2021-12-24

## 2021-12-24 RX ORDER — SODIUM CHLORIDE 9 MG/ML
1000 INJECTION INTRAMUSCULAR; INTRAVENOUS; SUBCUTANEOUS
Refills: 0 | Status: DISCONTINUED | OUTPATIENT
Start: 2021-12-24 | End: 2021-12-24

## 2021-12-24 RX ORDER — THIAMINE MONONITRATE (VIT B1) 100 MG
100 TABLET ORAL DAILY
Refills: 0 | Status: DISCONTINUED | OUTPATIENT
Start: 2021-12-24 | End: 2021-12-24

## 2021-12-24 RX ORDER — FOLIC ACID 0.8 MG
1 TABLET ORAL DAILY
Refills: 0 | Status: DISCONTINUED | OUTPATIENT
Start: 2021-12-24 | End: 2021-12-24

## 2021-12-24 RX ORDER — POTASSIUM PHOSPHATE, MONOBASIC POTASSIUM PHOSPHATE, DIBASIC 236; 224 MG/ML; MG/ML
15 INJECTION, SOLUTION INTRAVENOUS ONCE
Refills: 0 | Status: COMPLETED | OUTPATIENT
Start: 2021-12-24 | End: 2021-12-24

## 2021-12-24 RX ORDER — SUCRALFATE 1 G
1 TABLET ORAL
Refills: 0 | Status: DISCONTINUED | OUTPATIENT
Start: 2021-12-24 | End: 2021-12-24

## 2021-12-24 RX ORDER — ONDANSETRON 8 MG/1
4 TABLET, FILM COATED ORAL EVERY 8 HOURS
Refills: 0 | Status: DISCONTINUED | OUTPATIENT
Start: 2021-12-24 | End: 2021-12-24

## 2021-12-24 RX ORDER — ACETAMINOPHEN 500 MG
2 TABLET ORAL
Qty: 0 | Refills: 0 | DISCHARGE
Start: 2021-12-24

## 2021-12-24 RX ORDER — MAGNESIUM SULFATE 500 MG/ML
1 VIAL (ML) INJECTION ONCE
Refills: 0 | Status: COMPLETED | OUTPATIENT
Start: 2021-12-24 | End: 2021-12-24

## 2021-12-24 RX ORDER — PANTOPRAZOLE SODIUM 20 MG/1
40 TABLET, DELAYED RELEASE ORAL EVERY 12 HOURS
Refills: 0 | Status: DISCONTINUED | OUTPATIENT
Start: 2021-12-24 | End: 2021-12-24

## 2021-12-24 RX ADMIN — HYDROMORPHONE HYDROCHLORIDE 0.5 MILLIGRAM(S): 2 INJECTION INTRAMUSCULAR; INTRAVENOUS; SUBCUTANEOUS at 00:18

## 2021-12-24 RX ADMIN — Medication 1 GRAM(S): at 11:12

## 2021-12-24 RX ADMIN — POTASSIUM PHOSPHATE, MONOBASIC POTASSIUM PHOSPHATE, DIBASIC 62.5 MILLIMOLE(S): 236; 224 INJECTION, SOLUTION INTRAVENOUS at 11:38

## 2021-12-24 RX ADMIN — Medication 100 MILLIGRAM(S): at 11:12

## 2021-12-24 RX ADMIN — Medication 100 GRAM(S): at 10:45

## 2021-12-24 RX ADMIN — Medication 2 MILLIGRAM(S): at 05:58

## 2021-12-24 RX ADMIN — SODIUM CHLORIDE 100 MILLILITER(S): 9 INJECTION INTRAMUSCULAR; INTRAVENOUS; SUBCUTANEOUS at 03:40

## 2021-12-24 RX ADMIN — Medication 1 TABLET(S): at 11:12

## 2021-12-24 RX ADMIN — Medication 1 MILLIGRAM(S): at 11:12

## 2021-12-24 NOTE — DISCHARGE NOTE PROVIDER - NSDCCPCAREPLAN_GEN_ALL_CORE_FT
PRINCIPAL DISCHARGE DIAGNOSIS  Diagnosis: Gastritis  Assessment and Plan of Treatment: refrain from alcohol

## 2021-12-24 NOTE — DISCHARGE NOTE PROVIDER - HOSPITAL COURSE
55 y/o male w/ long standing Alcohol abuse with multiple/frequent admissions for severe w/d and alcoholic gastritis. His pain quickly resolved and is tolerating diet. He was counseled on refraining from etoh. Hes aaox4.

## 2021-12-24 NOTE — H&P ADULT - ASSESSMENT
55 y/o male w/ long standing Alcohol abuse with multiple/frequent admissions for severe w/d and alcoholic gastritis including ICU admissions in the past who presents with n/v/abd pain. Pt being admitted for alcohol gastritis    1) Acute on Chronic Gastritis due to alcohol abuse  - NPO  - IVF  - Zofran

## 2021-12-24 NOTE — DISCHARGE NOTE NURSING/CASE MANAGEMENT/SOCIAL WORK - PATIENT PORTAL LINK FT
You can access the FollowMyHealth Patient Portal offered by [facility] by registering at the following website: [email or web address]. By joining [employer]s FollowMyHealth portal, you will also be able to view your health information using other applications (apps) compatible with our system.

## 2021-12-24 NOTE — DISCHARGE NOTE PROVIDER - NSDCMRMEDTOKEN_GEN_ALL_CORE_FT
acetaminophen 325 mg oral tablet: 2 tab(s) orally every 6 hours, As needed, Temp greater or equal to 38C (100.4F), Mild Pain (1 - 3)  atorvastatin 20 mg oral tablet: 1 tab(s) orally once a day (at bedtime)  folic acid 1 mg oral tablet: 1 tab(s) orally once a day  Multiple Vitamins oral tablet: 1 tab(s) orally once a day  Protonix 40 mg oral delayed release tablet: 1 tab(s) orally 2 times a day   sucralfate 1 g oral tablet: 1 tab(s) orally 4 times a day  thiamine 100 mg oral tablet: 1 tab(s) orally once a day

## 2021-12-24 NOTE — DISCHARGE NOTE NURSING/CASE MANAGEMENT/SOCIAL WORK - NSDCVIVACCINE_GEN_ALL_CORE_FT
COVID-19, mRNA, LNP-S, PF, 100 mcg/ 0.5 mL dose (Moderna); 10-Jovan-2021 15:38; Reji Hernandez (RN); [employer] [id number] (Exp. Date: 13-Jul-2021); IntraMuscular; Deltoid Right.; 0.5 milliLiter(s);   influenza, injectable, quadrivalent, preservative free; 31-Jan-2019 15:53; Ayaka Bynum (RN); [employer]; [id number] (Exp. Date: 30-Jun-2019); IntraMuscular; Deltoid Right.; 0.5 milliLiter(s); VIS (VIS Published: 07-Aug-2015, VIS Presented: 31-Jan-2019);   influenza, injectable, quadrivalent, preservative free; 10-Nov-2019 14:13; Laverne Lane (RN); [employer]; [id number] (Exp. Date: 30-Jun-2020); IntraMuscular; Deltoid Left.; 0.5 milliLiter(s); VIS (VIS Published: 15-Aug-2019, VIS Presented: 10-Nov-2019);   influenza, injectable, quadrivalent, preservative free; 13-Oct-2020 11:56; Kimberli Cronin (RN); [employer]; [id number] (Exp. Date: 30-Jun-2021); IntraMuscular; Deltoid Right.; 0.5 milliLiter(s); VIS (VIS Published: 15-Aug-2019, VIS Presented: 13-Oct-2020);   Td (adult) preservative free; 18-Jan-2020 20:04; Yulia Vance (RN); [employer]; [id number] (Exp. Date: 19-Jan-2021); IntraMuscular; Deltoid Right.; 0.5 milliLiter(s); VIS (VIS Published: 18-Jan-2020, VIS Presented: 18-Jan-2020);

## 2021-12-24 NOTE — H&P ADULT - NSHPLABSRESULTS_GEN_ALL_CORE
T(C): 36.6 (12-24-21 @ 06:53), Max: 36.9 (12-23-21 @ 17:57)  HR: 73 (12-24-21 @ 06:53) (73 - 101)  BP: 142/94 (12-24-21 @ 06:53) (102/89 - 148/99)  RR: 17 (12-24-21 @ 06:53) (15 - 19)  SpO2: 97% (12-24-21 @ 06:53) (96% - 100%)                        12.3   3.68  )-----------( 226      ( 23 Dec 2021 20:16 )             38.8     12-23    139  |  102  |  12  ----------------------------<  78  3.6   |  21<L>  |  1.06    Ca    8.9      23 Dec 2021 20:16  Phos  2.3     12-23  Mg     2.6     12-23    TPro  8.6<H>  /  Alb  3.8  /  TBili  0.4  /  DBili  0.1  /  AST  35  /  ALT  19  /  AlkPhos  64  12-23    LIVER FUNCTIONS - ( 23 Dec 2021 20:16 )  Alb: 3.8 g/dL / Pro: 8.6 gm/dL / ALK PHOS: 64 U/L / ALT: 19 U/L / AST: 35 U/L / GGT: x               acetaminophen     Tablet .. 650 milliGRAM(s) Oral every 6 hours PRN  atorvastatin 20 milliGRAM(s) Oral at bedtime  folic acid 1 milliGRAM(s) Oral daily  LORazepam   Injectable 1 milliGRAM(s) IV Push every 1 hour PRN  LORazepam   Injectable 2 milliGRAM(s) IV Push every 1 hour PRN  multivitamin 1 Tablet(s) Oral daily  ondansetron Injectable 4 milliGRAM(s) IV Push every 8 hours PRN  pantoprazole    Tablet 40 milliGRAM(s) Oral every 12 hours  sodium chloride 0.9%. 1000 milliLiter(s) IV Continuous <Continuous>  sucralfate 1 Gram(s) Oral four times a day  thiamine 100 milliGRAM(s) Oral daily

## 2021-12-24 NOTE — H&P ADULT - HISTORY OF PRESENT ILLNESS
57 y/o male w/ long standing Alcohol abuse with multiple/frequent admissions with severe w/d including ICU admissions in the past who presents with n/v/abd pain

## 2021-12-24 NOTE — H&P ADULT - NSHPPHYSICALEXAM_GEN_ALL_CORE
PHYSICAL EXAM:    Vital Signs Last 24 Hrs  T(C): 36.6 (24 Dec 2021 06:53), Max: 36.9 (23 Dec 2021 17:57)  T(F): 97.8 (24 Dec 2021 06:53), Max: 98.4 (23 Dec 2021 17:57)  HR: 73 (24 Dec 2021 06:53) (73 - 101)  BP: 142/94 (24 Dec 2021 06:53) (102/89 - 148/99)  BP(mean): --  RR: 17 (24 Dec 2021 06:53) (15 - 19)  SpO2: 97% (24 Dec 2021 06:53) (96% - 100%)    GENERAL: Pt lying in bed comfortably in NAD  HEENT:  Atraumatic, EOMI, PERRL, conjunctiva and sclera clear, MMM  NECK: Supple, No JVD  CHEST/LUNG: Clear to auscultation bilaterally; No rales, rhonchi, wheezing or rubs. Unlabored respirations  HEART: Regular rate and rhythm; No murmurs, rubs, or gallops  ABDOMEN: Bowel sounds present; Soft, Nontender, Nondistended. No guarding or rigidity    EXTREMITIES:  2+ Peripheral Pulses, brisk capillary refill. No clubbing, cyanosis, or edema  NEUROLOGICAL:  Alert & Oriented X3, speech clear. Answers questions appropriately. Full and equal strength B/L upper and lower extremities. No deficits   MSK: FROM x 4 extremities   SKIN: No rashes or lesions

## 2021-12-24 NOTE — PATIENT PROFILE ADULT - FALL HARM RISK - HARM RISK INTERVENTIONS
Assistance with ambulation/Assistance OOB with selected safe patient handling equipment/Communicate Risk of Fall with Harm to all staff/Monitor for mental status changes/Monitor gait and stability/Reinforce activity limits and safety measures with patient and family/Tailored Fall Risk Interventions/Toileting schedule using arm’s reach rule for commode and bathroom/Use of alarms - bed, chair and/or voice tab/Visual Cue: Yellow wristband and red socks/Bed in lowest position, wheels locked, appropriate side rails in place/Call bell, personal items and telephone in reach/Instruct patient to call for assistance before getting out of bed or chair/Non-slip footwear when patient is out of bed/[place] to call system/Physically safe environment - no spills, clutter or unnecessary equipment/Purposeful Proactive Rounding/Room/bathroom lighting operational, light cord in reach

## 2021-12-24 NOTE — PATIENT PROFILE ADULT - NSTRANSFERBELONGINGSDISPO_GEN_A_NUR
Detail Level: Detailed Comment: She wore studs and this tore through.  They were not heavy studs. Would like this repaired again with patient

## 2022-01-01 DIAGNOSIS — E78.2 MIXED HYPERLIPIDEMIA: ICD-10-CM

## 2022-01-01 DIAGNOSIS — F10.10 ALCOHOL ABUSE, UNCOMPLICATED: ICD-10-CM
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
## 2022-01-01 DIAGNOSIS — K29.20 ALCOHOLIC GASTRITIS WITHOUT BLEEDING: ICD-10-CM

## 2022-01-01 DIAGNOSIS — K27.9 PEPTIC ULCER, SITE UNSPECIFIED, UNSPECIFIED AS ACUTE OR CHRONIC, WITHOUT HEMORRHAGE OR PERFORATION: ICD-10-CM

## 2022-01-03 ENCOUNTER — INPATIENT (INPATIENT)
Facility: HOSPITAL | Age: 57
LOS: 1 days | Discharge: HOME | End: 2022-01-05
Attending: INTERNAL MEDICINE | Admitting: INTERNAL MEDICINE
Payer: MEDICAID

## 2022-01-03 VITALS
RESPIRATION RATE: 18 BRPM | HEART RATE: 144 BPM | WEIGHT: 175.05 LBS | TEMPERATURE: 98 F | HEIGHT: 67 IN | OXYGEN SATURATION: 98 % | DIASTOLIC BLOOD PRESSURE: 98 MMHG | SYSTOLIC BLOOD PRESSURE: 152 MMHG

## 2022-01-03 DIAGNOSIS — E87.6 HYPOKALEMIA: ICD-10-CM

## 2022-01-03 DIAGNOSIS — R11.2 NAUSEA WITH VOMITING, UNSPECIFIED: ICD-10-CM

## 2022-01-03 DIAGNOSIS — F10.239 ALCOHOL DEPENDENCE WITH WITHDRAWAL, UNSPECIFIED: ICD-10-CM

## 2022-01-03 DIAGNOSIS — R00.0 TACHYCARDIA, UNSPECIFIED: ICD-10-CM

## 2022-01-03 DIAGNOSIS — R25.1 TREMOR, UNSPECIFIED: ICD-10-CM

## 2022-01-03 DIAGNOSIS — K29.20 ALCOHOLIC GASTRITIS WITHOUT BLEEDING: ICD-10-CM

## 2022-01-03 DIAGNOSIS — F10.231 ALCOHOL DEPENDENCE WITH WITHDRAWAL DELIRIUM: ICD-10-CM

## 2022-01-03 DIAGNOSIS — E78.2 MIXED HYPERLIPIDEMIA: ICD-10-CM

## 2022-01-03 DIAGNOSIS — K29.00 ACUTE GASTRITIS WITHOUT BLEEDING: ICD-10-CM

## 2022-01-03 LAB
ALBUMIN SERPL ELPH-MCNC: 4 G/DL — SIGNIFICANT CHANGE UP (ref 3.3–5)
ALP SERPL-CCNC: 52 U/L — SIGNIFICANT CHANGE UP (ref 40–120)
ALT FLD-CCNC: 29 U/L — SIGNIFICANT CHANGE UP (ref 12–78)
ANION GAP SERPL CALC-SCNC: 7 MMOL/L — SIGNIFICANT CHANGE UP (ref 5–17)
ANION GAP SERPL CALC-SCNC: 8 MMOL/L — SIGNIFICANT CHANGE UP (ref 5–17)
AST SERPL-CCNC: 33 U/L — SIGNIFICANT CHANGE UP (ref 15–37)
BASOPHILS # BLD AUTO: 0.04 K/UL — SIGNIFICANT CHANGE UP (ref 0–0.2)
BASOPHILS NFR BLD AUTO: 1 % — SIGNIFICANT CHANGE UP (ref 0–2)
BILIRUB SERPL-MCNC: 0.7 MG/DL — SIGNIFICANT CHANGE UP (ref 0.2–1.2)
BUN SERPL-MCNC: 16 MG/DL — SIGNIFICANT CHANGE UP (ref 7–23)
BUN SERPL-MCNC: 19 MG/DL — SIGNIFICANT CHANGE UP (ref 7–23)
CALCIUM SERPL-MCNC: 8.2 MG/DL — LOW (ref 8.5–10.1)
CALCIUM SERPL-MCNC: 9.5 MG/DL — SIGNIFICANT CHANGE UP (ref 8.5–10.1)
CHLORIDE SERPL-SCNC: 104 MMOL/L — SIGNIFICANT CHANGE UP (ref 96–108)
CHLORIDE SERPL-SCNC: 108 MMOL/L — SIGNIFICANT CHANGE UP (ref 96–108)
CO2 SERPL-SCNC: 27 MMOL/L — SIGNIFICANT CHANGE UP (ref 22–31)
CO2 SERPL-SCNC: 29 MMOL/L — SIGNIFICANT CHANGE UP (ref 22–31)
CREAT SERPL-MCNC: 1.05 MG/DL — SIGNIFICANT CHANGE UP (ref 0.5–1.3)
CREAT SERPL-MCNC: 1.2 MG/DL — SIGNIFICANT CHANGE UP (ref 0.5–1.3)
EOSINOPHIL # BLD AUTO: 0.01 K/UL — SIGNIFICANT CHANGE UP (ref 0–0.5)
EOSINOPHIL NFR BLD AUTO: 0.2 % — SIGNIFICANT CHANGE UP (ref 0–6)
ETHANOL SERPL-MCNC: <10 MG/DL — SIGNIFICANT CHANGE UP (ref 0–10)
FLUAV AG NPH QL: SIGNIFICANT CHANGE UP
FLUBV AG NPH QL: SIGNIFICANT CHANGE UP
GLUCOSE SERPL-MCNC: 110 MG/DL — HIGH (ref 70–99)
GLUCOSE SERPL-MCNC: 117 MG/DL — HIGH (ref 70–99)
HCT VFR BLD CALC: 38.8 % — LOW (ref 39–50)
HGB BLD-MCNC: 12.2 G/DL — LOW (ref 13–17)
IMM GRANULOCYTES NFR BLD AUTO: 0.2 % — SIGNIFICANT CHANGE UP (ref 0–1.5)
LIDOCAIN IGE QN: 262 U/L — SIGNIFICANT CHANGE UP (ref 73–393)
LYMPHOCYTES # BLD AUTO: 0.55 K/UL — LOW (ref 1–3.3)
LYMPHOCYTES # BLD AUTO: 13.2 % — SIGNIFICANT CHANGE UP (ref 13–44)
MAGNESIUM SERPL-MCNC: 2 MG/DL — SIGNIFICANT CHANGE UP (ref 1.6–2.6)
MCHC RBC-ENTMCNC: 23.7 PG — LOW (ref 27–34)
MCHC RBC-ENTMCNC: 31.4 GM/DL — LOW (ref 32–36)
MCV RBC AUTO: 75.5 FL — LOW (ref 80–100)
MONOCYTES # BLD AUTO: 0.5 K/UL — SIGNIFICANT CHANGE UP (ref 0–0.9)
MONOCYTES NFR BLD AUTO: 12 % — SIGNIFICANT CHANGE UP (ref 2–14)
NEUTROPHILS # BLD AUTO: 3.06 K/UL — SIGNIFICANT CHANGE UP (ref 1.8–7.4)
NEUTROPHILS NFR BLD AUTO: 73.4 % — SIGNIFICANT CHANGE UP (ref 43–77)
NRBC # BLD: 0 /100 WBCS — SIGNIFICANT CHANGE UP (ref 0–0)
PLATELET # BLD AUTO: 254 K/UL — SIGNIFICANT CHANGE UP (ref 150–400)
POTASSIUM SERPL-MCNC: 3.3 MMOL/L — LOW (ref 3.5–5.3)
POTASSIUM SERPL-MCNC: 4.6 MMOL/L — SIGNIFICANT CHANGE UP (ref 3.5–5.3)
POTASSIUM SERPL-SCNC: 3.3 MMOL/L — LOW (ref 3.5–5.3)
POTASSIUM SERPL-SCNC: 4.6 MMOL/L — SIGNIFICANT CHANGE UP (ref 3.5–5.3)
PROT SERPL-MCNC: 8.3 GM/DL — SIGNIFICANT CHANGE UP (ref 6–8.3)
RBC # BLD: 5.14 M/UL — SIGNIFICANT CHANGE UP (ref 4.2–5.8)
RBC # FLD: 24.4 % — HIGH (ref 10.3–14.5)
SARS-COV-2 RNA SPEC QL NAA+PROBE: SIGNIFICANT CHANGE UP
SODIUM SERPL-SCNC: 141 MMOL/L — SIGNIFICANT CHANGE UP (ref 135–145)
SODIUM SERPL-SCNC: 142 MMOL/L — SIGNIFICANT CHANGE UP (ref 135–145)
WBC # BLD: 4.17 K/UL — SIGNIFICANT CHANGE UP (ref 3.8–10.5)
WBC # FLD AUTO: 4.17 K/UL — SIGNIFICANT CHANGE UP (ref 3.8–10.5)

## 2022-01-03 PROCEDURE — 74177 CT ABD & PELVIS W/CONTRAST: CPT | Mod: 26,MG

## 2022-01-03 PROCEDURE — G1004: CPT

## 2022-01-03 PROCEDURE — 99285 EMERGENCY DEPT VISIT HI MDM: CPT

## 2022-01-03 PROCEDURE — 93010 ELECTROCARDIOGRAM REPORT: CPT

## 2022-01-03 PROCEDURE — 99223 1ST HOSP IP/OBS HIGH 75: CPT

## 2022-01-03 RX ORDER — SODIUM CHLORIDE 9 MG/ML
1000 INJECTION, SOLUTION INTRAVENOUS
Refills: 0 | Status: DISCONTINUED | OUTPATIENT
Start: 2022-01-03 | End: 2022-01-05

## 2022-01-03 RX ORDER — MORPHINE SULFATE 50 MG/1
2 CAPSULE, EXTENDED RELEASE ORAL EVERY 8 HOURS
Refills: 0 | Status: DISCONTINUED | OUTPATIENT
Start: 2022-01-03 | End: 2022-01-04

## 2022-01-03 RX ORDER — FOLIC ACID 0.8 MG
1 TABLET ORAL DAILY
Refills: 0 | Status: DISCONTINUED | OUTPATIENT
Start: 2022-01-03 | End: 2022-01-05

## 2022-01-03 RX ORDER — LANOLIN ALCOHOL/MO/W.PET/CERES
3 CREAM (GRAM) TOPICAL AT BEDTIME
Refills: 0 | Status: DISCONTINUED | OUTPATIENT
Start: 2022-01-03 | End: 2022-01-05

## 2022-01-03 RX ORDER — POTASSIUM CHLORIDE 20 MEQ
20 PACKET (EA) ORAL ONCE
Refills: 0 | Status: COMPLETED | OUTPATIENT
Start: 2022-01-03 | End: 2022-01-03

## 2022-01-03 RX ORDER — FAMOTIDINE 10 MG/ML
20 INJECTION INTRAVENOUS ONCE
Refills: 0 | Status: COMPLETED | OUTPATIENT
Start: 2022-01-03 | End: 2022-01-03

## 2022-01-03 RX ORDER — THIAMINE MONONITRATE (VIT B1) 100 MG
100 TABLET ORAL DAILY
Refills: 0 | Status: DISCONTINUED | OUTPATIENT
Start: 2022-01-03 | End: 2022-01-05

## 2022-01-03 RX ORDER — ATORVASTATIN CALCIUM 80 MG/1
20 TABLET, FILM COATED ORAL AT BEDTIME
Refills: 0 | Status: DISCONTINUED | OUTPATIENT
Start: 2022-01-03 | End: 2022-01-05

## 2022-01-03 RX ORDER — ONDANSETRON 8 MG/1
4 TABLET, FILM COATED ORAL ONCE
Refills: 0 | Status: COMPLETED | OUTPATIENT
Start: 2022-01-03 | End: 2022-01-03

## 2022-01-03 RX ORDER — PANTOPRAZOLE SODIUM 20 MG/1
40 TABLET, DELAYED RELEASE ORAL ONCE
Refills: 0 | Status: COMPLETED | OUTPATIENT
Start: 2022-01-03 | End: 2022-01-03

## 2022-01-03 RX ORDER — SODIUM CHLORIDE 9 MG/ML
1000 INJECTION INTRAMUSCULAR; INTRAVENOUS; SUBCUTANEOUS ONCE
Refills: 0 | Status: COMPLETED | OUTPATIENT
Start: 2022-01-03 | End: 2022-01-03

## 2022-01-03 RX ORDER — SUCRALFATE 1 G
1 TABLET ORAL
Refills: 0 | Status: DISCONTINUED | OUTPATIENT
Start: 2022-01-03 | End: 2022-01-05

## 2022-01-03 RX ORDER — PANTOPRAZOLE SODIUM 20 MG/1
40 TABLET, DELAYED RELEASE ORAL
Refills: 0 | Status: DISCONTINUED | OUTPATIENT
Start: 2022-01-03 | End: 2022-01-05

## 2022-01-03 RX ORDER — ACETAMINOPHEN 500 MG
650 TABLET ORAL EVERY 6 HOURS
Refills: 0 | Status: DISCONTINUED | OUTPATIENT
Start: 2022-01-03 | End: 2022-01-05

## 2022-01-03 RX ORDER — ONDANSETRON 8 MG/1
4 TABLET, FILM COATED ORAL EVERY 6 HOURS
Refills: 0 | Status: DISCONTINUED | OUTPATIENT
Start: 2022-01-03 | End: 2022-01-05

## 2022-01-03 RX ADMIN — Medication 4 MILLIGRAM(S): at 23:38

## 2022-01-03 RX ADMIN — FAMOTIDINE 20 MILLIGRAM(S): 10 INJECTION INTRAVENOUS at 10:30

## 2022-01-03 RX ADMIN — PANTOPRAZOLE SODIUM 40 MILLIGRAM(S): 20 TABLET, DELAYED RELEASE ORAL at 15:20

## 2022-01-03 RX ADMIN — MORPHINE SULFATE 2 MILLIGRAM(S): 50 CAPSULE, EXTENDED RELEASE ORAL at 15:19

## 2022-01-03 RX ADMIN — Medication 30 MILLILITER(S): at 15:19

## 2022-01-03 RX ADMIN — SODIUM CHLORIDE 2000 MILLILITER(S): 9 INJECTION INTRAMUSCULAR; INTRAVENOUS; SUBCUTANEOUS at 10:30

## 2022-01-03 RX ADMIN — Medication 2 MILLIGRAM(S): at 09:44

## 2022-01-03 RX ADMIN — Medication 650 MILLIGRAM(S): at 22:39

## 2022-01-03 RX ADMIN — ATORVASTATIN CALCIUM 20 MILLIGRAM(S): 80 TABLET, FILM COATED ORAL at 21:46

## 2022-01-03 RX ADMIN — MORPHINE SULFATE 2 MILLIGRAM(S): 50 CAPSULE, EXTENDED RELEASE ORAL at 17:23

## 2022-01-03 RX ADMIN — SODIUM CHLORIDE 100 MILLILITER(S): 9 INJECTION, SOLUTION INTRAVENOUS at 18:36

## 2022-01-03 RX ADMIN — Medication 650 MILLIGRAM(S): at 21:42

## 2022-01-03 RX ADMIN — ONDANSETRON 4 MILLIGRAM(S): 8 TABLET, FILM COATED ORAL at 09:44

## 2022-01-03 RX ADMIN — Medication 50 MILLIEQUIVALENT(S): at 15:29

## 2022-01-03 RX ADMIN — MORPHINE SULFATE 2 MILLIGRAM(S): 50 CAPSULE, EXTENDED RELEASE ORAL at 23:38

## 2022-01-03 RX ADMIN — PANTOPRAZOLE SODIUM 40 MILLIGRAM(S): 20 TABLET, DELAYED RELEASE ORAL at 23:37

## 2022-01-03 NOTE — ED PROVIDER NOTE - ATTENDING CONTRIBUTION TO CARE
56 year old male that is an alcoholic in acute alcohol withdrawal. On exam: vitals noted, rrr, lungs cta, abd soft, nt, pt requires admission for parenteral meds for alcohol withdrawal.

## 2022-01-03 NOTE — H&P ADULT - NSHPPHYSICALEXAM_GEN_ALL_CORE
CONSTITUTIONAL: Well developed, well nourished, alert and cooperative, mildly tremulous   EYES: PERRL, EOMI, no scleral icterus  ENT: Mucosa moist, tongue normal.   NECK: Neck supple, trachea midline, non-tender, no masses or thyromegaly.  CARDIAC: Normal S1 and S2. Regular rate and rhythms. No murmurs. No Pedal edema. Peripheral pulses intact  LUNGS: Clear to auscultation, equal air entry both lungs. No rales, rhonchi, wheezing. Normal respiratory effort.   ABDOMEN: Soft, nondistended. Epigastric tenderness noted. No guarding or rebound tenderness. No hepatomegaly or splenomegaly. Bowel sound normal  MUSCULOSKELETAL: Normocephalic, atraumatic. Spine normal without deformity or tenderness. No significant deformity or joint abnormality  NEUROLOGICAL: No gross motor or sensory deficits. CN II-XII grossly intact  SKIN: no lesions or eruptions. Normal turgor  PSYCHIATRIC: A&O x 3, mildly tremulous

## 2022-01-03 NOTE — H&P ADULT - HISTORY OF PRESENT ILLNESS
56y years old male with h/o Alcohol abuse with multiple/frequent admissions with severe w/d including ICU admissions in the past present to ED with complain of nausea, vomiting and abd pain for 3-4 days. Pain is contant, 9/10, sharp buring pain, not relieved with medications he took at home. He drink 1 bottle of vodka daily with last drink being 2-3 days ago  Tachycardic in ED. CT abd/pelvis ( image reviewed) with no acute pathology. No leukocytosis, Plt 254, K 3.3, Cr 1.05, lipase 262. Tremulous in ED, improve with IV ativan.     SH: drink 1 liter of vodka daily

## 2022-01-03 NOTE — ED PROVIDER NOTE - CLINICAL SUMMARY MEDICAL DECISION MAKING FREE TEXT BOX
55 yo m with PMH ETOH abuse, DTs, PUD, gastritis, HLD presents with tremors, body aches and nausea. On exam, tachycardic, intermittent emesis, tremors to both arms, diffuse abd tenderness. Likely admission for ETOH withdrawal. CT r/o bowel perf. Labs. Ativan, zofran, fluids to manage sx

## 2022-01-03 NOTE — PATIENT PROFILE ADULT - FALL HARM RISK - HARM RISK INTERVENTIONS
Assistance with ambulation/Assistance OOB with selected safe patient handling equipment/Communicate Risk of Fall with Harm to all staff/Monitor for mental status changes/Monitor gait and stability/Reinforce activity limits and safety measures with patient and family/Tailored Fall Risk Interventions/Toileting schedule using arm’s reach rule for commode and bathroom/Use of alarms - bed, chair and/or voice tab/Visual Cue: Yellow wristband and red socks/Bed in lowest position, wheels locked, appropriate side rails in place/Call bell, personal items and telephone in reach/Instruct patient to call for assistance before getting out of bed or chair/Non-slip footwear when patient is out of bed/[place] to call system/Physically safe environment - no spills, clutter or unnecessary equipment/Purposeful Proactive Rounding/Room/bathroom lighting operational, light cord in reach

## 2022-01-03 NOTE — H&P ADULT - PROBLEM SELECTOR PLAN 1
Tremulous in ED, tachycardic, improve with IV ativan  Reported last drink was around 3 days ago  Now mildly tremulous  [facility] protocol  thiamine, folic acid and multivitamin

## 2022-01-03 NOTE — ED PROVIDER NOTE - OBJECTIVE STATEMENT
55 yo m with PMH ETOH abuse, DTs, PUD, gastritis, HLD presents with tremors, body aches and nausea. Reports not drinking in past 2 days and started having full body shaking, nausea with emesis, and body aches. Reports drinking about 1 liter of vodka daily at baseline, but was havign some abd pain, thus stopped drinking. Covid vaccinated x2. Denies

## 2022-01-03 NOTE — H&P ADULT - ASSESSMENT
56y years old male with h/o Alcohol abuse with multiple/frequent admissions with severe w/d including ICU admissions in the past present to ED with complain of nausea, vomiting and abd pain for 3-4 days  Tachycardic in ED. CT abd/pelvis (image reviewed) with no acute pathology. No leukocytosis, Plt 254, K 3.3, Cr 1.05, lipase 262. Tremulous in ED, improve with IV ativan.  Tachycardia improved with ativan and IV fluid      Admitted with alcoholic gastritis, alcohol withdrawal

## 2022-01-03 NOTE — ED ADULT TRIAGE NOTE - ARRIVAL FROM
Call 911 for stroke/Need for follow up after discharge/Prescribed medications/Risk factors for stroke/Stroke education booklet/Stroke support groups for patients, families, and friends/Stroke warning signs and symptoms/Signs and symptoms of stroke Home

## 2022-01-03 NOTE — H&P ADULT - PROBLEM SELECTOR PLAN 2
Recent admission for the same  CT abd with no acute pathology  IV PPI bid for now  Maalox prn  Continue Carafate  Clear liquid diet and advance as tolerated

## 2022-01-04 LAB
ALBUMIN SERPL ELPH-MCNC: 3.1 G/DL — LOW (ref 3.3–5)
ALP SERPL-CCNC: 42 U/L — SIGNIFICANT CHANGE UP (ref 40–120)
ALT FLD-CCNC: 23 U/L — SIGNIFICANT CHANGE UP (ref 12–78)
ANION GAP SERPL CALC-SCNC: 6 MMOL/L — SIGNIFICANT CHANGE UP (ref 5–17)
ANION GAP SERPL CALC-SCNC: 7 MMOL/L — SIGNIFICANT CHANGE UP (ref 5–17)
AST SERPL-CCNC: 31 U/L — SIGNIFICANT CHANGE UP (ref 15–37)
BILIRUB SERPL-MCNC: 1 MG/DL — SIGNIFICANT CHANGE UP (ref 0.2–1.2)
BUN SERPL-MCNC: 13 MG/DL — SIGNIFICANT CHANGE UP (ref 7–23)
BUN SERPL-MCNC: 13 MG/DL — SIGNIFICANT CHANGE UP (ref 7–23)
CALCIUM SERPL-MCNC: 8.1 MG/DL — LOW (ref 8.5–10.1)
CALCIUM SERPL-MCNC: 8.1 MG/DL — LOW (ref 8.5–10.1)
CHLORIDE SERPL-SCNC: 106 MMOL/L — SIGNIFICANT CHANGE UP (ref 96–108)
CHLORIDE SERPL-SCNC: 106 MMOL/L — SIGNIFICANT CHANGE UP (ref 96–108)
CO2 SERPL-SCNC: 26 MMOL/L — SIGNIFICANT CHANGE UP (ref 22–31)
CO2 SERPL-SCNC: 27 MMOL/L — SIGNIFICANT CHANGE UP (ref 22–31)
CREAT SERPL-MCNC: 0.85 MG/DL — SIGNIFICANT CHANGE UP (ref 0.5–1.3)
CREAT SERPL-MCNC: 0.97 MG/DL — SIGNIFICANT CHANGE UP (ref 0.5–1.3)
GLUCOSE SERPL-MCNC: 91 MG/DL — SIGNIFICANT CHANGE UP (ref 70–99)
GLUCOSE SERPL-MCNC: 92 MG/DL — SIGNIFICANT CHANGE UP (ref 70–99)
HCT VFR BLD CALC: 33.5 % — LOW (ref 39–50)
HGB BLD-MCNC: 10.2 G/DL — LOW (ref 13–17)
MAGNESIUM SERPL-MCNC: 1.9 MG/DL — SIGNIFICANT CHANGE UP (ref 1.6–2.6)
MCHC RBC-ENTMCNC: 23.8 PG — LOW (ref 27–34)
MCHC RBC-ENTMCNC: 30.4 GM/DL — LOW (ref 32–36)
MCV RBC AUTO: 78.3 FL — LOW (ref 80–100)
NRBC # BLD: 0 /100 WBCS — SIGNIFICANT CHANGE UP (ref 0–0)
PHOSPHATE SERPL-MCNC: 2.1 MG/DL — LOW (ref 2.5–4.5)
PLATELET # BLD AUTO: 211 K/UL — SIGNIFICANT CHANGE UP (ref 150–400)
POTASSIUM SERPL-MCNC: 3.2 MMOL/L — LOW (ref 3.5–5.3)
POTASSIUM SERPL-MCNC: 3.2 MMOL/L — LOW (ref 3.5–5.3)
POTASSIUM SERPL-SCNC: 3.2 MMOL/L — LOW (ref 3.5–5.3)
POTASSIUM SERPL-SCNC: 3.2 MMOL/L — LOW (ref 3.5–5.3)
PROT SERPL-MCNC: 6.7 GM/DL — SIGNIFICANT CHANGE UP (ref 6–8.3)
RBC # BLD: 4.28 M/UL — SIGNIFICANT CHANGE UP (ref 4.2–5.8)
RBC # FLD: 24.3 % — HIGH (ref 10.3–14.5)
SODIUM SERPL-SCNC: 139 MMOL/L — SIGNIFICANT CHANGE UP (ref 135–145)
SODIUM SERPL-SCNC: 139 MMOL/L — SIGNIFICANT CHANGE UP (ref 135–145)
WBC # BLD: 2.53 K/UL — LOW (ref 3.8–10.5)
WBC # FLD AUTO: 2.53 K/UL — LOW (ref 3.8–10.5)

## 2022-01-04 PROCEDURE — 99232 SBSQ HOSP IP/OBS MODERATE 35: CPT

## 2022-01-04 PROCEDURE — 36569 INSJ PICC 5 YR+ W/O IMAGING: CPT

## 2022-01-04 RX ORDER — POTASSIUM CHLORIDE 20 MEQ
40 PACKET (EA) ORAL EVERY 4 HOURS
Refills: 0 | Status: COMPLETED | OUTPATIENT
Start: 2022-01-04 | End: 2022-01-04

## 2022-01-04 RX ORDER — CHLORHEXIDINE GLUCONATE 213 G/1000ML
1 SOLUTION TOPICAL DAILY
Refills: 0 | Status: DISCONTINUED | OUTPATIENT
Start: 2022-01-04 | End: 2022-01-05

## 2022-01-04 RX ADMIN — Medication 1 GRAM(S): at 01:17

## 2022-01-04 RX ADMIN — Medication 1 GRAM(S): at 17:12

## 2022-01-04 RX ADMIN — Medication 650 MILLIGRAM(S): at 21:41

## 2022-01-04 RX ADMIN — PANTOPRAZOLE SODIUM 40 MILLIGRAM(S): 20 TABLET, DELAYED RELEASE ORAL at 05:03

## 2022-01-04 RX ADMIN — Medication 4 MILLIGRAM(S): at 20:00

## 2022-01-04 RX ADMIN — MORPHINE SULFATE 2 MILLIGRAM(S): 50 CAPSULE, EXTENDED RELEASE ORAL at 00:37

## 2022-01-04 RX ADMIN — Medication 4 MILLIGRAM(S): at 16:12

## 2022-01-04 RX ADMIN — Medication 4 MILLIGRAM(S): at 11:49

## 2022-01-04 RX ADMIN — Medication 1 MILLIGRAM(S): at 11:37

## 2022-01-04 RX ADMIN — Medication 4 MILLIGRAM(S): at 08:00

## 2022-01-04 RX ADMIN — Medication 650 MILLIGRAM(S): at 22:11

## 2022-01-04 RX ADMIN — Medication 40 MILLIEQUIVALENT(S): at 11:37

## 2022-01-04 RX ADMIN — MORPHINE SULFATE 2 MILLIGRAM(S): 50 CAPSULE, EXTENDED RELEASE ORAL at 16:12

## 2022-01-04 RX ADMIN — Medication 1 GRAM(S): at 11:37

## 2022-01-04 RX ADMIN — Medication 40 MILLIEQUIVALENT(S): at 16:13

## 2022-01-04 RX ADMIN — Medication 4 MILLIGRAM(S): at 03:09

## 2022-01-04 RX ADMIN — Medication 100 MILLIGRAM(S): at 11:37

## 2022-01-04 RX ADMIN — Medication 1 GRAM(S): at 05:04

## 2022-01-04 RX ADMIN — ATORVASTATIN CALCIUM 20 MILLIGRAM(S): 80 TABLET, FILM COATED ORAL at 21:41

## 2022-01-04 RX ADMIN — Medication 1 TABLET(S): at 11:37

## 2022-01-04 RX ADMIN — PANTOPRAZOLE SODIUM 40 MILLIGRAM(S): 20 TABLET, DELAYED RELEASE ORAL at 17:15

## 2022-01-04 NOTE — PROGRESS NOTE ADULT - PROBLEM SELECTOR PLAN 1
Tremulous in ED, tachycardic, improve with IV ativan  Reported last drink was around 3 days ago  CIWA protocol with ativan taper  IVF  thiamine, folic acid and multivitamin

## 2022-01-04 NOTE — PROCEDURE NOTE - ADDITIONAL PROCEDURE DETAILS
Patient seen at bedside for midline catheter placement.  Consent obtained from patient after risks/benefits/alternatives explained.   Upper extremity prepped and draped in usual sterile fashion. Time out performed with RN. 4Fr 1 cm single lumen midline catheter placed using ultrasound guided Seldinger technique, L Brachial vein. Flushes well, with good venous return. Patient tolerated procedure well without complication and was left in no acute distress.

## 2022-01-04 NOTE — PROCEDURE NOTE - NSPROCDETAILS_GEN_ALL_CORE
location identified, draped/prepped, sterile technique used/blood seen on insertion/dressing applied/flushes easily/secured in place/sterile technique, catheter placed
location identified, draped/prepped, sterile technique used/sterile dressing applied/sterile technique, catheter placed/supine position/ultrasound guidance
No

## 2022-01-04 NOTE — PROGRESS NOTE ADULT - SUBJECTIVE AND OBJECTIVE BOX
Patient is a 56y old  Male who presents with a chief complaint of acute gastritis, alcohol withdrawal (03 Jan 2022 14:46)       INTERVAL HPI/OVERNIGHT EVENTS: Doing ok today. CIWA 2 this AM.      REVIEW OF SYSTEMS:   Remaining ROS negative    Home Medications:  acetaminophen 325 mg oral tablet: 2 tab(s) orally every 6 hours, As needed, Temp greater or equal to 38C (100.4F), Mild Pain (1 - 3) (24 Dec 2021 14:10)  atorvastatin 20 mg oral tablet: 1 tab(s) orally once a day (at bedtime) (24 Dec 2021 14:10)        MEDICATIONS  (STANDING):  atorvastatin 20 milliGRAM(s) Oral at bedtime  folic acid 1 milliGRAM(s) Oral daily  lactated ringers. 1000 milliLiter(s) (100 mL/Hr) IV Continuous <Continuous>  LORazepam   Injectable   IV Push   LORazepam   Injectable 4 milliGRAM(s) IV Push every 4 hours  LORazepam   Injectable 3 milliGRAM(s) IV Push every 4 hours  multivitamin 1 Tablet(s) Oral daily  pantoprazole  Injectable 40 milliGRAM(s) IV Push two times a day  potassium chloride    Tablet ER 40 milliEquivalent(s) Oral every 4 hours  sucralfate 1 Gram(s) Oral four times a day  thiamine 100 milliGRAM(s) Oral daily    MEDICATIONS  (PRN):  acetaminophen     Tablet .. 650 milliGRAM(s) Oral every 6 hours PRN Mild Pain (1 - 3), Moderate Pain (4 - 6)  aluminum hydroxide/magnesium hydroxide/simethicone Suspension 30 milliLiter(s) Oral every 4 hours PRN Dyspepsia  LORazepam   Injectable 2 milliGRAM(s) IV Push every 1 hour PRN Symptom-triggered: each CIWA -Ar score 8 or GREATER  melatonin 3 milliGRAM(s) Oral at bedtime PRN Insomnia  morphine  - Injectable 2 milliGRAM(s) IV Push every 8 hours PRN Severe Pain (7 - 10)  ondansetron Injectable 4 milliGRAM(s) IV Push every 6 hours PRN Nausea and/or Vomiting      Allergies    No Known Allergies    Intolerances        Vital Signs Last 24 Hrs  T(C): 36.6 (04 Jan 2022 06:42), Max: 37 (03 Jan 2022 15:38)  T(F): 97.8 (04 Jan 2022 06:42), Max: 98.6 (03 Jan 2022 15:38)  HR: 72 (04 Jan 2022 06:42) (72 - 89)  BP: 139/84 (04 Jan 2022 06:42) (139/84 - 162/70)  BP(mean): --  RR: 18 (04 Jan 2022 06:42) (17 - 18)  SpO2: 99% (04 Jan 2022 06:42) (96% - 99%)    PHYSICAL EXAM:  GENERAL: NAD  HEAD:  Atraumatic, Normocephalic  EYES: EOMI, PERRLA, conjunctiva and sclera clear  ENT: O/P Clear  NECK: Supple, No JVD  NERVOUS SYSTEM:  No focal deficits  CHEST/LUNG: Clear to percussion bilaterally; No rales, rhonchi, wheezing  HEART: Regular rate and rhythm; No murmurs, rubs, or gallops  ABDOMEN: Soft, Nontender, Nondistended; Bowel sounds present  EXTREMITIES:  2+ Peripheral Pulses, No clubbing, cyanosis, or edema  SKIN: No rashes or lesions    LABS:                        10.2   2.53  )-----------( 211      ( 04 Jan 2022 06:18 )             33.5     01-04    139  |  106  |  13  ----------------------------<  91  3.2<L>   |  27  |  0.85    Ca    8.1<L>      04 Jan 2022 06:18  Phos  2.1     01-04  Mg     1.9     01-04    TPro  6.7  /  Alb  3.1<L>  /  TBili  1.0  /  DBili  x   /  AST  31  /  ALT  23  /  AlkPhos  42  01-04        CAPILLARY BLOOD GLUCOSE          RADIOLOGY & ADDITIONAL TESTS:    Imaging Personally Reviewed:  [ ] YES  [ ] NO    Consultant(s) Notes Reviewed:  [ ] YES  [ ] NO    Care Discussed with Consultants/Other Providers [ ] YES  [ ] NO

## 2022-01-05 ENCOUNTER — TRANSCRIPTION ENCOUNTER (OUTPATIENT)
Age: 57
End: 2022-01-05

## 2022-01-05 VITALS
TEMPERATURE: 98 F | HEART RATE: 89 BPM | RESPIRATION RATE: 18 BRPM | DIASTOLIC BLOOD PRESSURE: 83 MMHG | OXYGEN SATURATION: 99 % | SYSTOLIC BLOOD PRESSURE: 121 MMHG

## 2022-01-05 LAB
ALBUMIN SERPL ELPH-MCNC: 3.2 G/DL — LOW (ref 3.3–5)
ALP SERPL-CCNC: 47 U/L — SIGNIFICANT CHANGE UP (ref 40–120)
ALT FLD-CCNC: 25 U/L — SIGNIFICANT CHANGE UP (ref 12–78)
ANION GAP SERPL CALC-SCNC: 7 MMOL/L — SIGNIFICANT CHANGE UP (ref 5–17)
AST SERPL-CCNC: 31 U/L — SIGNIFICANT CHANGE UP (ref 15–37)
BILIRUB SERPL-MCNC: 0.7 MG/DL — SIGNIFICANT CHANGE UP (ref 0.2–1.2)
BUN SERPL-MCNC: 6 MG/DL — LOW (ref 7–23)
CALCIUM SERPL-MCNC: 8.7 MG/DL — SIGNIFICANT CHANGE UP (ref 8.5–10.1)
CHLORIDE SERPL-SCNC: 104 MMOL/L — SIGNIFICANT CHANGE UP (ref 96–108)
CO2 SERPL-SCNC: 25 MMOL/L — SIGNIFICANT CHANGE UP (ref 22–31)
CREAT SERPL-MCNC: 0.91 MG/DL — SIGNIFICANT CHANGE UP (ref 0.5–1.3)
GLUCOSE SERPL-MCNC: 87 MG/DL — SIGNIFICANT CHANGE UP (ref 70–99)
HCT VFR BLD CALC: 35.7 % — LOW (ref 39–50)
HGB BLD-MCNC: 10.8 G/DL — LOW (ref 13–17)
MCHC RBC-ENTMCNC: 23.9 PG — LOW (ref 27–34)
MCHC RBC-ENTMCNC: 30.3 GM/DL — LOW (ref 32–36)
MCV RBC AUTO: 79.2 FL — LOW (ref 80–100)
NRBC # BLD: 0 /100 WBCS — SIGNIFICANT CHANGE UP (ref 0–0)
PHOSPHATE SERPL-MCNC: 3 MG/DL — SIGNIFICANT CHANGE UP (ref 2.5–4.5)
PLATELET # BLD AUTO: 212 K/UL — SIGNIFICANT CHANGE UP (ref 150–400)
POTASSIUM SERPL-MCNC: 3.5 MMOL/L — SIGNIFICANT CHANGE UP (ref 3.5–5.3)
POTASSIUM SERPL-SCNC: 3.5 MMOL/L — SIGNIFICANT CHANGE UP (ref 3.5–5.3)
PROT SERPL-MCNC: 7.3 GM/DL — SIGNIFICANT CHANGE UP (ref 6–8.3)
RBC # BLD: 4.51 M/UL — SIGNIFICANT CHANGE UP (ref 4.2–5.8)
RBC # FLD: 23.5 % — HIGH (ref 10.3–14.5)
SODIUM SERPL-SCNC: 136 MMOL/L — SIGNIFICANT CHANGE UP (ref 135–145)
WBC # BLD: 3.34 K/UL — LOW (ref 3.8–10.5)
WBC # FLD AUTO: 3.34 K/UL — LOW (ref 3.8–10.5)

## 2022-01-05 PROCEDURE — 99239 HOSP IP/OBS DSCHRG MGMT >30: CPT

## 2022-01-05 RX ADMIN — Medication 100 MILLIGRAM(S): at 11:45

## 2022-01-05 RX ADMIN — Medication 1 GRAM(S): at 17:57

## 2022-01-05 RX ADMIN — Medication 3 MILLIGRAM(S): at 00:21

## 2022-01-05 RX ADMIN — Medication 1 TABLET(S): at 11:45

## 2022-01-05 RX ADMIN — Medication 1 MILLIGRAM(S): at 11:45

## 2022-01-05 RX ADMIN — Medication 3 MILLIGRAM(S): at 09:55

## 2022-01-05 RX ADMIN — PANTOPRAZOLE SODIUM 40 MILLIGRAM(S): 20 TABLET, DELAYED RELEASE ORAL at 17:57

## 2022-01-05 RX ADMIN — Medication 3 MILLIGRAM(S): at 06:00

## 2022-01-05 RX ADMIN — Medication 1 GRAM(S): at 11:46

## 2022-01-05 RX ADMIN — Medication 1 GRAM(S): at 01:57

## 2022-01-05 RX ADMIN — PANTOPRAZOLE SODIUM 40 MILLIGRAM(S): 20 TABLET, DELAYED RELEASE ORAL at 05:48

## 2022-01-05 RX ADMIN — Medication 1 GRAM(S): at 05:48

## 2022-01-05 NOTE — DISCHARGE NOTE NURSING/CASE MANAGEMENT/SOCIAL WORK - NSDCPEFALRISK_GEN_ALL_CORE
For information on Fall & Injury Prevention, visit: [email or web address] OR  [email or web address] OR  https://www.cdc.gov/steadi/patient.html

## 2022-01-05 NOTE — DISCHARGE NOTE PROVIDER - NSDCMRMEDTOKEN_GEN_ALL_CORE_FT
atorvastatin 20 mg oral tablet: 1 tab(s) orally once a day (at bedtime)  folic acid 1 mg oral tablet: 1 tab(s) orally once a day  Multiple Vitamins oral tablet: 1 tab(s) orally once a day  Protonix 40 mg oral delayed release tablet: 1 tab(s) orally 2 times a day   sucralfate 1 g oral tablet: 1 tab(s) orally 4 times a day  thiamine 100 mg oral tablet: 1 tab(s) orally once a day

## 2022-01-05 NOTE — DISCHARGE NOTE PROVIDER - HOSPITAL COURSE
55 yo man with history of Alcohol abuse with multiple/frequent admissions with severe w/d including ICU admissions in the past present to ED with complain of nausea, vomiting and abd pain for 3-4 days. CT     Tachycardic in ED. CT abd/pelvis (image reviewed) with no acute pathology. No leukocytosis, Plt 254, K 3.3, Cr 1.05, lipase 262. Tremulous in ED, improve with IV ativan.  Tachycardia improved with ativan and IV fluid      Admitted with alcoholic gastritis, alcohol withdrawal     Problem/Plan - 1:  ·  Problem: Alcohol withdrawal.   ·  Plan: Tremulous in ED, tachycardic, improve with IV ativan  Reported last drink was around 3 days ago  CIWA protocol with ativan taper  IVF  thiamine, folic acid and multivitamin.     Problem/Plan - 2:  ·  Problem: Acute alcoholic gastritis.   ·  Plan: Recent admission for the same  CT abd with no acute pathology  IV PPI bid for now  Maalox prn  Continue Carafate  Clear liquid diet and advance as tolerated.     Problem/Plan - 3:  ·  Problem: Intractable nausea and vomiting.   ·  Plan: Likely due to alcoholic gastritis  IV fluid  Zofran prn.     Problem/Plan - 4:  ·  Problem: Hypokalemia.   ·  Plan: K 3.3  Replace with IV KCl 20Eq  Monitor and replace serum electrolytes.   57 yo man with history of Alcohol abuse with multiple/frequent admissions with severe w/d including ICU admissions in the past presented to ED with complain of nausea, vomiting and abd pain and was admitted for alcohol withdrawal and alcoholic gastritis. CT abd was only significant for fatty infiltration of the liver. Pt was informed of this and advised to stop alcohol use and follow up w/ PMD for further work up. He was tremulous and tachycardic in ED and improve with IV ativan and IV fluid. The patient was also given PPO and carafate. His nausea and vomiting, as well as alcohol withdrawal, resolved.      Discharge time: 43 minutes

## 2022-01-05 NOTE — DISCHARGE NOTE NURSING/CASE MANAGEMENT/SOCIAL WORK - PATIENT PORTAL LINK FT
You can access the FollowMyHealth Patient Portal offered by [facility] by registering at the following website: [email or web address]. By joining [facility]’s FollowMyHealth portal, you will also be able to view your health information using other applications (apps) compatible with our system.

## 2022-01-05 NOTE — DISCHARGE NOTE PROVIDER - ATTENDING DISCHARGE PHYSICAL EXAMINATION:
Vital Signs Last 24 Hrs  T(C): 37.1 (05 Jan 2022 12:19), Max: 37.1 (05 Jan 2022 12:19)  T(F): 98.7 (05 Jan 2022 12:19), Max: 98.7 (05 Jan 2022 12:19)  HR: 85 (05 Jan 2022 12:19) (68 - 85)  BP: 120/81 (05 Jan 2022 12:19) (111/76 - 121/74)   RR: 18 (05 Jan 2022 12:19) (18 - 18)  SpO2: 99% (05 Jan 2022 12:19) (98% - 99%)    PHYSICAL EXAM:  GENERAL: NAD, well-groomed, well-developed  HEAD:  Atraumatic, Normocephalic  EYES: EOMI   NECK: Supple   NERVOUS SYSTEM: Alert & Oriented X3, Good concentration; no tremors  CHEST/LUNG: Clear to auscultation bilaterally; No rales, rhonchi, wheezing, or rubs  HEART: Regular rate and rhythm; No murmurs, rubs, or gallops  ABDOMEN: Soft, Nontender, Nondistended; Bowel sounds present  EXTREMITIES: No clubbing, cyanosis, or edema

## 2022-01-05 NOTE — DISCHARGE NOTE NURSING/CASE MANAGEMENT/SOCIAL WORK - NSDCVIVACCINE_GEN_ALL_CORE_FT
COVID-19, mRNA, LNP-S, PF, 100 mcg/ 0.5 mL dose (Moderna); 10-Jovan-2021 15:38; Reji Hernandez (RN); [email or web address] [id number] (Exp. Date: 13-Jul-2021); IntraMuscular; Deltoid Right.; 0.5 milliLiter(s);   influenza, injectable, quadrivalent, preservative free; 31-Jan-2019 15:53; Ayaka Bynum (RN); [employer]; [id number] (Exp. Date: 30-Jun-2019); IntraMuscular; Deltoid Right.; 0.5 milliLiter(s); VIS (VIS Published: 07-Aug-2015, VIS Presented: 31-Jan-2019);   influenza, injectable, quadrivalent, preservative free; 10-Nov-2019 14:13; Laverne Lane (RN); [employer]; [id number] (Exp. Date: 30-Jun-2020); IntraMuscular; Deltoid Left.; 0.5 milliLiter(s); VIS (VIS Published: 15-Aug-2019, VIS Presented: 10-Nov-2019);   influenza, injectable, quadrivalent, preservative free; 13-Oct-2020 11:56; Kimberli Cronin (RN); [employer]; [id number] (Exp. Date: 30-Jun-2021); IntraMuscular; Deltoid Right.; 0.5 milliLiter(s); VIS (VIS Published: 15-Aug-2019, VIS Presented: 13-Oct-2020);   Td (adult) preservative free; 18-Jan-2020 20:04; Yulia Vance (RN); [employer]; [id number] (Exp. Date: 19-Jan-2021); IntraMuscular; Deltoid Right.; 0.5 milliLiter(s); VIS (VIS Published: 18-Jan-2020, VIS Presented: 18-Jan-2020);

## 2022-01-11 NOTE — ED PROVIDER NOTE - CCCP TRG CHIEF CMPLNT
Subjective:      Patient ID: Geoffrey Patel is a 78 y.o. male. CC: Patient presents for re-evaluation of chronic health problems including diabetes mellitus, hypertension, osteoarthritis of the left knee and BPH symptoms. Rometta Favre HPI Patient presents today for a follow-up on chronic medications and medical conditions. Patient feels orthopedic issues especially involving his left knee and is affecting his right back. He never did seek orthopedic intervention but thinks he is going to reach out to the orthopedic specialist soon. He is not doing as many as activities as the knee issues are restricting some of his activities. He is more compliant with his diet now with his wife's assistance. He denies any chest pain or shortness of breath with activities. Eye exam current (within one year): Yes    Checks sugars at home: no  Home blood sugar records: patient does not test  Any episodes of hypoglycemia?  No    Current medication use: taking as prescribed  Medication side effects: none     Current diet: working on diet   Current exercise:not active    Review of Systems     Patient Active Problem List   Diagnosis    Essential hypertension    Osteoarthritis of left knee    Type 2 diabetes mellitus without complication, without long-term current use of insulin (HCC)    Erectile dysfunction    Benign prostatic hyperplasia with lower urinary tract symptoms       Outpatient Medications Marked as Taking for the 1/11/22 encounter (Office Visit) with Koko Patel MD   Medication Sig Dispense Refill    sildenafil (REVATIO) 20 MG tablet TAKE TWO TO THREE TABLETS DAILY AS NEEDED 90 tablet 0    lisinopril-hydroCHLOROthiazide (PRINZIDE;ZESTORETIC) 20-12.5 MG per tablet TAKE ONE TABLET BY MOUTH DAILY 90 tablet 1    tamsulosin (FLOMAX) 0.4 MG capsule TAKE ONE CAPSULE BY MOUTH DAILY 90 capsule 1    metFORMIN (GLUCOPHAGE-XR) 500 MG extended release tablet Take 2 tablets by mouth daily 180 tablet 1    atorvastatin (LIPITOR) 40 MG tablet Take 1 tablet by mouth daily 90 tablet 1    Fish Oil-Cholecalciferol (FISH OIL + D3 PO) Take  by mouth.  aspirin 81 MG EC tablet Take 81 mg by mouth daily. No Known Allergies    Social History     Tobacco Use    Smoking status: Never Smoker    Smokeless tobacco: Never Used   Substance Use Topics    Alcohol use: No       BP (!) 152/84 (Site: Left Upper Arm, Position: Sitting, Cuff Size: Medium Adult)   Pulse 72   Temp 97.3 °F (36.3 °C) (Infrared)   Wt 240 lb (108.9 kg)   SpO2 98%   BMI 35.19 kg/m²       Objective:   Physical Exam  Constitutional:       Appearance: He is well-developed. Neck:      Vascular: No carotid bruit. Cardiovascular:      Rate and Rhythm: Normal rate and regular rhythm. Pulses:           Dorsalis pedis pulses are 2+ on the right side and 2+ on the left side. Posterior tibial pulses are 2+ on the right side and 2+ on the left side. Heart sounds: Normal heart sounds. No murmur heard. Pulmonary:      Effort: Pulmonary effort is normal.      Breath sounds: Normal breath sounds. Musculoskeletal:         General: Normal range of motion. Right hip: Tenderness (Greater trochanteric bursa) present. Normal range of motion. Left hip: Normal.      Right knee: Deformity (crepitus) present. Normal range of motion. No tenderness. Left knee: Deformity (Gross crepitus medially) present. Normal range of motion. No tenderness. No LCL laxity or MCL laxity. Normal patellar mobility. Right foot: Normal.      Left foot: Normal.   Neurological:      Mental Status: He is alert. Sensory: No sensory deficit. Deep Tendon Reflexes: Reflexes are normal and symmetric. Comments: 12 point monofilament test normal    Psychiatric:         Behavior: Behavior is cooperative. Assessment:      Aimee Nelson was seen today for follow-up and diabetes.     Diagnoses and all orders for this visit:    Type 2 diabetes mellitus without complication, without long-term current use of insulin ([facility] [address]  -      DIABETES FOOT EXAM  -     Basic Metabolic Panel; Future  -     Hemoglobin A1C; Future    Essential hypertension    Primary osteoarthritis of left knee    Benign prostatic hyperplasia with urinary frequency            Plan:      Reviewed labs and test results with patient. Diabetes mellitus much better controlled now with the better diet. Maintain current medications. For the osteoarthritis recommended patient reach out to orthopedic specialist and he may take Tylenol or anti-inflammatory medication on a as needed basis. we did discuss knee brace as well. Patient received counseling on the following healthy behaviors: nutrition and exercise     Patient given educational materials     Health maintenance updated    Discussed use, benefit, and side effects of prescribed medications. Barriers to medication compliance addressed. All patient questions answered. Pt voiced understanding. Patient needs RTC in 4 months. Medical decision making of moderate complexity. Please note that this chart was generated using Dragon dictation software. Although every effort was made to ensure the accuracy of this automated transcription, some errors in transcription may have occurred. ABD pain/alcohol intoxication

## 2022-01-18 NOTE — ED PROVIDER NOTE - NS ED MD DISPO DIVISION
No respiratory distress. No stridor, Lungs sounds clear with good aeration bilaterally. [facility] 18-Jan-2022 02:36

## 2022-01-19 NOTE — ED ADULT NURSE NOTE - ED STAT RN HANDOFF DETAILS
No Report endorsed to oncoming RN for my break coverage. Report given to covering RN PO and patient informed during rounding Safety checks compld this shift/Safety rounds completed hourly.  Fall +skin precs in place. Any issues endorsed to oncoming RN for follow up. RN Assumed patient's care for coverage. Awaiting to be seen

## 2022-01-27 NOTE — PATIENT PROFILE ADULT - NSPROGENOTHERPROVIDER_GEN_A_NUR
The medication list included in this document is our record of what you are currently taking, including any changes that were made at today's visit.  If you find any differences when compared to your medications at home, or have any questions that were not answered at your visit, please contact the office. none

## 2022-01-31 NOTE — PROGRESS NOTE ADULT - PROBLEM SELECTOR PLAN 1
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 No new lesions.\par Stable cirrhosis and splenomegaly/varices.\par - Other CLD w/u neg besides ESTELLE (1:640), Anti-Smooth muscle  Ab (1:20)\par - Send for Hep C PCR, Hep Panel, AFP, Alpha 1 AT, CMP, Bilirubin\par - pt will need repeat EGD\par - No need CT Chest but will order MRI, \par \par # Hep C\par - pt unaware of diagnosis, no prior Rx before\par - Hep A neg, Hep B Core IgG Pos w/ Neg Ab and Ag from 2016 indicating prior exposure\par \par # Colon polyps\par - Sigmoid colon: tubular adenoma, fu colonoscopy this year\par - Rectal polyp: hyperplastic polyp\par - repeat  colonoscopy this year\par \par f/u in 2 -4 weeks\par \par  Stable today  A, O x 3 today  if remains stable will dc in am

## 2022-02-01 ENCOUNTER — INPATIENT (INPATIENT)
Facility: HOSPITAL | Age: 57
LOS: 2 days | Discharge: HOME | End: 2022-02-04
Attending: INTERNAL MEDICINE | Admitting: INTERNAL MEDICINE
Payer: MEDICAID

## 2022-02-01 VITALS
SYSTOLIC BLOOD PRESSURE: 127 MMHG | HEART RATE: 140 BPM | DIASTOLIC BLOOD PRESSURE: 87 MMHG | RESPIRATION RATE: 20 BRPM | OXYGEN SATURATION: 100 % | TEMPERATURE: 98 F | HEIGHT: 67 IN | WEIGHT: 149.91 LBS

## 2022-02-01 LAB
ACETONE SERPL-MCNC: NEGATIVE — SIGNIFICANT CHANGE UP
ALBUMIN SERPL ELPH-MCNC: 3.7 G/DL — SIGNIFICANT CHANGE UP (ref 3.3–5)
ALBUMIN SERPL ELPH-MCNC: 4.8 G/DL — SIGNIFICANT CHANGE UP (ref 3.3–5)
ALP SERPL-CCNC: 55 U/L — SIGNIFICANT CHANGE UP (ref 40–120)
ALP SERPL-CCNC: 75 U/L — SIGNIFICANT CHANGE UP (ref 40–120)
ALT FLD-CCNC: 26 U/L — SIGNIFICANT CHANGE UP (ref 12–78)
ALT FLD-CCNC: 33 U/L — SIGNIFICANT CHANGE UP (ref 12–78)
ANION GAP SERPL CALC-SCNC: 24 MMOL/L — HIGH (ref 5–17)
ANION GAP SERPL CALC-SCNC: 7 MMOL/L — SIGNIFICANT CHANGE UP (ref 5–17)
ANISOCYTOSIS BLD QL: SIGNIFICANT CHANGE UP
APPEARANCE UR: CLEAR — SIGNIFICANT CHANGE UP
AST SERPL-CCNC: 30 U/L — SIGNIFICANT CHANGE UP (ref 15–37)
AST SERPL-CCNC: 41 U/L — HIGH (ref 15–37)
B-OH-BUTYR SERPL-SCNC: 0.7 MMOL/L — HIGH
BASE EXCESS BLDV CALC-SCNC: -16.7 MMOL/L — LOW (ref -2–3)
BASOPHILS # BLD AUTO: 0 K/UL — SIGNIFICANT CHANGE UP (ref 0–0.2)
BASOPHILS NFR BLD AUTO: 0 % — SIGNIFICANT CHANGE UP (ref 0–2)
BILIRUB DIRECT SERPL-MCNC: 0.1 MG/DL — SIGNIFICANT CHANGE UP (ref 0–0.3)
BILIRUB INDIRECT FLD-MCNC: 0.4 MG/DL — SIGNIFICANT CHANGE UP (ref 0.2–1)
BILIRUB SERPL-MCNC: 0.3 MG/DL — SIGNIFICANT CHANGE UP (ref 0.2–1.2)
BILIRUB SERPL-MCNC: 0.5 MG/DL — SIGNIFICANT CHANGE UP (ref 0.2–1.2)
BILIRUB UR-MCNC: NEGATIVE — SIGNIFICANT CHANGE UP
BLOOD GAS COMMENTS, VENOUS: SIGNIFICANT CHANGE UP
BUN SERPL-MCNC: 21 MG/DL — SIGNIFICANT CHANGE UP (ref 7–23)
BUN SERPL-MCNC: 24 MG/DL — HIGH (ref 7–23)
BURR CELLS BLD QL SMEAR: SLIGHT — SIGNIFICANT CHANGE UP
CALCIUM SERPL-MCNC: 8.8 MG/DL — SIGNIFICANT CHANGE UP (ref 8.5–10.1)
CALCIUM SERPL-MCNC: 9.3 MG/DL — SIGNIFICANT CHANGE UP (ref 8.5–10.1)
CHLORIDE BLDV-SCNC: 105 MMOL/L — SIGNIFICANT CHANGE UP (ref 98–107)
CHLORIDE SERPL-SCNC: 108 MMOL/L — SIGNIFICANT CHANGE UP (ref 96–108)
CHLORIDE SERPL-SCNC: 108 MMOL/L — SIGNIFICANT CHANGE UP (ref 96–108)
CO2 BLDV-SCNC: 13 MMOL/L — LOW (ref 22–26)
CO2 SERPL-SCNC: 10 MMOL/L — CRITICAL LOW (ref 22–31)
CO2 SERPL-SCNC: 24 MMOL/L — SIGNIFICANT CHANGE UP (ref 22–31)
COLOR SPEC: YELLOW — SIGNIFICANT CHANGE UP
CREAT SERPL-MCNC: 1.32 MG/DL — HIGH (ref 0.5–1.3)
CREAT SERPL-MCNC: 1.93 MG/DL — HIGH (ref 0.5–1.3)
DACRYOCYTES BLD QL SMEAR: SLIGHT — SIGNIFICANT CHANGE UP
DIFF PNL FLD: ABNORMAL
EOSINOPHIL # BLD AUTO: 0 K/UL — SIGNIFICANT CHANGE UP (ref 0–0.5)
EOSINOPHIL NFR BLD AUTO: 0 % — SIGNIFICANT CHANGE UP (ref 0–6)
EPI CELLS # UR: SIGNIFICANT CHANGE UP
ETHANOL SERPL-MCNC: 160 MG/DL — HIGH (ref 0–10)
FLUAV AG NPH QL: SIGNIFICANT CHANGE UP
FLUBV AG NPH QL: SIGNIFICANT CHANGE UP
GAS PNL BLDV: 145 MMOL/L — SIGNIFICANT CHANGE UP (ref 136–145)
GAS PNL BLDV: SIGNIFICANT CHANGE UP
GAS PNL BLDV: SIGNIFICANT CHANGE UP
GLUCOSE BLDV-MCNC: 126 MG/DL — HIGH (ref 65–95)
GLUCOSE SERPL-MCNC: 113 MG/DL — HIGH (ref 70–99)
GLUCOSE SERPL-MCNC: 115 MG/DL — HIGH (ref 70–99)
GLUCOSE UR QL: NEGATIVE MG/DL — SIGNIFICANT CHANGE UP
HCO3 BLDV-SCNC: 12 MMOL/L — LOW (ref 22–28)
HCT VFR BLD CALC: 51.5 % — HIGH (ref 39–50)
HCT VFR BLDA CALC: 43 % — SIGNIFICANT CHANGE UP (ref 37–47)
HGB BLD CALC-MCNC: 14.4 G/DL — SIGNIFICANT CHANGE UP (ref 12.6–17.4)
HGB BLD-MCNC: 15 G/DL — SIGNIFICANT CHANGE UP (ref 13–17)
HOROWITZ INDEX BLDV+IHG-RTO: 21 — SIGNIFICANT CHANGE UP
KETONES UR-MCNC: ABNORMAL
LACTATE BLDV-MCNC: >17 MMOL/L — SIGNIFICANT CHANGE UP (ref 0.56–1.39)
LACTATE SERPL-SCNC: 15.9 MMOL/L — CRITICAL HIGH (ref 0.7–2)
LACTATE SERPL-SCNC: 7.7 MMOL/L — CRITICAL HIGH (ref 0.7–2)
LEUKOCYTE ESTERASE UR-ACNC: NEGATIVE — SIGNIFICANT CHANGE UP
LIDOCAIN IGE QN: 133 U/L — SIGNIFICANT CHANGE UP (ref 73–393)
LYMPHOCYTES # BLD AUTO: 1.56 K/UL — SIGNIFICANT CHANGE UP (ref 1–3.3)
LYMPHOCYTES # BLD AUTO: 15 % — SIGNIFICANT CHANGE UP (ref 13–44)
MAGNESIUM SERPL-MCNC: 2.1 MG/DL — SIGNIFICANT CHANGE UP (ref 1.6–2.6)
MAGNESIUM SERPL-MCNC: 2.3 MG/DL — SIGNIFICANT CHANGE UP (ref 1.6–2.6)
MANUAL SMEAR VERIFICATION: YES — SIGNIFICANT CHANGE UP
MCHC RBC-ENTMCNC: 22.9 PG — LOW (ref 27–34)
MCHC RBC-ENTMCNC: 29.1 G/DL — LOW (ref 32–36)
MCV RBC AUTO: 78.7 FL — LOW (ref 80–100)
MICROCYTES BLD QL: SLIGHT — SIGNIFICANT CHANGE UP
MONOCYTES # BLD AUTO: 0.52 K/UL — SIGNIFICANT CHANGE UP (ref 0–0.9)
MONOCYTES NFR BLD AUTO: 5 % — SIGNIFICANT CHANGE UP (ref 2–14)
NEUTROPHILS # BLD AUTO: 8.34 K/UL — HIGH (ref 1.8–7.4)
NEUTROPHILS NFR BLD AUTO: 80 % — HIGH (ref 43–77)
NITRITE UR-MCNC: NEGATIVE — SIGNIFICANT CHANGE UP
NRBC # BLD: 0 /100 — SIGNIFICANT CHANGE UP (ref 0–0)
NRBC # BLD: SIGNIFICANT CHANGE UP /100 WBCS (ref 0–0)
PCO2 BLDV: 38 MMHG — LOW (ref 42–55)
PH BLDV: 7.11 — CRITICAL LOW (ref 7.32–7.43)
PH UR: 5 — SIGNIFICANT CHANGE UP (ref 5–8)
PHOSPHATE SERPL-MCNC: 1.8 MG/DL — LOW (ref 2.5–4.5)
PLAT MORPH BLD: NORMAL — SIGNIFICANT CHANGE UP
PLATELET # BLD AUTO: 377 K/UL — SIGNIFICANT CHANGE UP (ref 150–400)
PLATELET COUNT - ESTIMATE: NORMAL — SIGNIFICANT CHANGE UP
PO2 BLDV: SIGNIFICANT CHANGE UP MMHG (ref 25–45)
POTASSIUM BLDV-SCNC: 6.1 MMOL/L — HIGH (ref 3.5–5.1)
POTASSIUM SERPL-MCNC: 4.2 MMOL/L — SIGNIFICANT CHANGE UP (ref 3.5–5.3)
POTASSIUM SERPL-MCNC: 4.7 MMOL/L — SIGNIFICANT CHANGE UP (ref 3.5–5.3)
POTASSIUM SERPL-SCNC: 4.2 MMOL/L — SIGNIFICANT CHANGE UP (ref 3.5–5.3)
POTASSIUM SERPL-SCNC: 4.7 MMOL/L — SIGNIFICANT CHANGE UP (ref 3.5–5.3)
PROT SERPL-MCNC: 10.4 GM/DL — HIGH (ref 6–8.3)
PROT SERPL-MCNC: 7.8 GM/DL — SIGNIFICANT CHANGE UP (ref 6–8.3)
PROT UR-MCNC: 30 MG/DL
RBC # BLD: 6.54 M/UL — HIGH (ref 4.2–5.8)
RBC # FLD: 22.8 % — HIGH (ref 10.3–14.5)
RBC BLD AUTO: NORMAL — SIGNIFICANT CHANGE UP
RBC CASTS # UR COMP ASSIST: NEGATIVE /HPF — SIGNIFICANT CHANGE UP (ref 0–4)
SAO2 % BLDV: 38.9 % — LOW (ref 94–98)
SARS-COV-2 RNA SPEC QL NAA+PROBE: SIGNIFICANT CHANGE UP
SODIUM SERPL-SCNC: 139 MMOL/L — SIGNIFICANT CHANGE UP (ref 135–145)
SODIUM SERPL-SCNC: 142 MMOL/L — SIGNIFICANT CHANGE UP (ref 135–145)
SP GR SPEC: 1.02 — SIGNIFICANT CHANGE UP (ref 1.01–1.02)
TROPONIN I, HIGH SENSITIVITY RESULT: 49.2 NG/L — SIGNIFICANT CHANGE UP
UROBILINOGEN FLD QL: NEGATIVE MG/DL — SIGNIFICANT CHANGE UP
WBC # BLD: 10.43 K/UL — SIGNIFICANT CHANGE UP (ref 3.8–10.5)
WBC # FLD AUTO: 10.43 K/UL — SIGNIFICANT CHANGE UP (ref 3.8–10.5)
WBC UR QL: SIGNIFICANT CHANGE UP

## 2022-02-01 PROCEDURE — 99291 CRITICAL CARE FIRST HOUR: CPT

## 2022-02-01 PROCEDURE — 71045 X-RAY EXAM CHEST 1 VIEW: CPT | Mod: 26

## 2022-02-01 PROCEDURE — 74177 CT ABD & PELVIS W/CONTRAST: CPT | Mod: 26,MA

## 2022-02-01 PROCEDURE — 99223 1ST HOSP IP/OBS HIGH 75: CPT

## 2022-02-01 RX ORDER — LANOLIN ALCOHOL/MO/W.PET/CERES
3 CREAM (GRAM) TOPICAL AT BEDTIME
Refills: 0 | Status: DISCONTINUED | OUTPATIENT
Start: 2022-02-01 | End: 2022-02-04

## 2022-02-01 RX ORDER — SODIUM CHLORIDE 9 MG/ML
1000 INJECTION, SOLUTION INTRAVENOUS ONCE
Refills: 0 | Status: COMPLETED | OUTPATIENT
Start: 2022-02-01 | End: 2022-02-01

## 2022-02-01 RX ORDER — THIAMINE MONONITRATE (VIT B1) 100 MG
100 TABLET ORAL DAILY
Refills: 0 | Status: DISCONTINUED | OUTPATIENT
Start: 2022-02-01 | End: 2022-02-04

## 2022-02-01 RX ORDER — SODIUM CHLORIDE 9 MG/ML
1000 INJECTION, SOLUTION INTRAVENOUS
Refills: 0 | Status: DISCONTINUED | OUTPATIENT
Start: 2022-02-01 | End: 2022-02-04

## 2022-02-01 RX ORDER — KETOROLAC TROMETHAMINE 30 MG/ML
15 SYRINGE (ML) INJECTION ONCE
Refills: 0 | Status: DISCONTINUED | OUTPATIENT
Start: 2022-02-01 | End: 2022-02-01

## 2022-02-01 RX ORDER — FOLIC ACID 0.8 MG
1 TABLET ORAL DAILY
Refills: 0 | Status: DISCONTINUED | OUTPATIENT
Start: 2022-02-01 | End: 2022-02-04

## 2022-02-01 RX ORDER — FOLIC ACID 0.8 MG
1 TABLET ORAL ONCE
Refills: 0 | Status: COMPLETED | OUTPATIENT
Start: 2022-02-01 | End: 2022-02-01

## 2022-02-01 RX ORDER — PANTOPRAZOLE SODIUM 20 MG/1
40 TABLET, DELAYED RELEASE ORAL EVERY 12 HOURS
Refills: 0 | Status: DISCONTINUED | OUTPATIENT
Start: 2022-02-01 | End: 2022-02-04

## 2022-02-01 RX ORDER — THIAMINE MONONITRATE (VIT B1) 100 MG
100 TABLET ORAL ONCE
Refills: 0 | Status: COMPLETED | OUTPATIENT
Start: 2022-02-01 | End: 2022-02-01

## 2022-02-01 RX ORDER — MORPHINE SULFATE 50 MG/1
4 CAPSULE, EXTENDED RELEASE ORAL ONCE
Refills: 0 | Status: DISCONTINUED | OUTPATIENT
Start: 2022-02-01 | End: 2022-02-01

## 2022-02-01 RX ORDER — FAMOTIDINE 10 MG/ML
20 INJECTION INTRAVENOUS ONCE
Refills: 0 | Status: COMPLETED | OUTPATIENT
Start: 2022-02-01 | End: 2022-02-01

## 2022-02-01 RX ORDER — ONDANSETRON 8 MG/1
4 TABLET, FILM COATED ORAL EVERY 8 HOURS
Refills: 0 | Status: DISCONTINUED | OUTPATIENT
Start: 2022-02-01 | End: 2022-02-04

## 2022-02-01 RX ORDER — SUCRALFATE 1 G
1 TABLET ORAL EVERY 6 HOURS
Refills: 0 | Status: DISCONTINUED | OUTPATIENT
Start: 2022-02-01 | End: 2022-02-04

## 2022-02-01 RX ORDER — ACETAMINOPHEN 500 MG
650 TABLET ORAL EVERY 6 HOURS
Refills: 0 | Status: DISCONTINUED | OUTPATIENT
Start: 2022-02-01 | End: 2022-02-04

## 2022-02-01 RX ORDER — METOCLOPRAMIDE HCL 10 MG
10 TABLET ORAL ONCE
Refills: 0 | Status: COMPLETED | OUTPATIENT
Start: 2022-02-01 | End: 2022-02-01

## 2022-02-01 RX ORDER — SODIUM BICARBONATE 1 MEQ/ML
650 SYRINGE (ML) INTRAVENOUS DAILY
Refills: 0 | Status: DISCONTINUED | OUTPATIENT
Start: 2022-02-01 | End: 2022-02-01

## 2022-02-01 RX ORDER — DIAZEPAM 5 MG
5 TABLET ORAL ONCE
Refills: 0 | Status: DISCONTINUED | OUTPATIENT
Start: 2022-02-01 | End: 2022-02-01

## 2022-02-01 RX ADMIN — Medication 100 MILLIGRAM(S): at 07:39

## 2022-02-01 RX ADMIN — Medication 2 MILLIGRAM(S): at 18:26

## 2022-02-01 RX ADMIN — SODIUM CHLORIDE 125 MILLILITER(S): 9 INJECTION, SOLUTION INTRAVENOUS at 14:32

## 2022-02-01 RX ADMIN — Medication 30 MILLILITER(S): at 22:01

## 2022-02-01 RX ADMIN — Medication 10 MILLIGRAM(S): at 06:48

## 2022-02-01 RX ADMIN — Medication 2 MILLIGRAM(S): at 22:01

## 2022-02-01 RX ADMIN — Medication 1 GRAM(S): at 22:01

## 2022-02-01 RX ADMIN — SODIUM CHLORIDE 1000 MILLILITER(S): 9 INJECTION, SOLUTION INTRAVENOUS at 11:18

## 2022-02-01 RX ADMIN — Medication 5 MILLIGRAM(S): at 07:39

## 2022-02-01 RX ADMIN — Medication 1 TABLET(S): at 14:30

## 2022-02-01 RX ADMIN — MORPHINE SULFATE 4 MILLIGRAM(S): 50 CAPSULE, EXTENDED RELEASE ORAL at 09:55

## 2022-02-01 RX ADMIN — Medication 650 MILLIGRAM(S): at 14:31

## 2022-02-01 RX ADMIN — Medication 650 MILLIGRAM(S): at 22:30

## 2022-02-01 RX ADMIN — Medication 650 MILLIGRAM(S): at 22:08

## 2022-02-01 RX ADMIN — Medication 1 MILLIGRAM(S): at 08:39

## 2022-02-01 RX ADMIN — Medication 100 MILLIGRAM(S): at 14:31

## 2022-02-01 RX ADMIN — MORPHINE SULFATE 4 MILLIGRAM(S): 50 CAPSULE, EXTENDED RELEASE ORAL at 10:25

## 2022-02-01 RX ADMIN — Medication 15 MILLIGRAM(S): at 12:20

## 2022-02-01 RX ADMIN — Medication 2 MILLIGRAM(S): at 13:20

## 2022-02-01 RX ADMIN — SODIUM CHLORIDE 1000 MILLILITER(S): 9 INJECTION, SOLUTION INTRAVENOUS at 06:47

## 2022-02-01 RX ADMIN — Medication 1 MILLIGRAM(S): at 14:30

## 2022-02-01 RX ADMIN — SODIUM CHLORIDE 1000 MILLILITER(S): 9 INJECTION, SOLUTION INTRAVENOUS at 09:52

## 2022-02-01 RX ADMIN — SODIUM CHLORIDE 125 MILLILITER(S): 9 INJECTION, SOLUTION INTRAVENOUS at 22:00

## 2022-02-01 RX ADMIN — PANTOPRAZOLE SODIUM 40 MILLIGRAM(S): 20 TABLET, DELAYED RELEASE ORAL at 18:25

## 2022-02-01 RX ADMIN — Medication 15 MILLIGRAM(S): at 11:20

## 2022-02-01 RX ADMIN — FAMOTIDINE 20 MILLIGRAM(S): 10 INJECTION INTRAVENOUS at 11:17

## 2022-02-01 RX ADMIN — SODIUM CHLORIDE 1000 MILLILITER(S): 9 INJECTION, SOLUTION INTRAVENOUS at 11:22

## 2022-02-01 NOTE — H&P ADULT - HISTORY OF PRESENT ILLNESS
56M PMHx of ETOH abuse (hx of w/d, seizures/ DTs), gastritis, PUD, HLD, substance abuse presenting with "pain all over body" today. Last drink was 2 days ago. Describes associated nausea/vomiting in the ED. Denies any fevers, chills, headaches, chest pain, shortness of breath, dysuria/hematuria, other drug use, hematemesis, hematochezia    ER course:  vitals: patient normotensive tachy at 138  labs: hb 15, lactate of 14, Bicarb of 10, cr of 1.93  imaging: ct abdomen; benign  Interventions: patient received valium 5, two doses of pepcid and IV toradol for pain

## 2022-02-01 NOTE — H&P ADULT - NSHPLABSRESULTS_GEN_ALL_CORE
Labs:                          15.0   10.43 )-----------( 377      ( 01 Feb 2022 07:47 )             51.5     02-01    139  |  108  |  24<H>  ----------------------------<  115<H>  4.2   |  24  |  1.32<H>    Ca    8.8      01 Feb 2022 14:44  Phos  1.8     02-01  Mg     2.3     02-01    TPro  7.8  /  Alb  3.7  /  TBili  0.5  /  DBili  0.1  /  AST  30  /  ALT  26  /  AlkPhos  55  02-01    LIVER FUNCTIONS - ( 01 Feb 2022 14:44 )  Alb: 3.7 g/dL / Pro: 7.8 gm/dL / ALK PHOS: 55 U/L / ALT: 26 U/L / AST: 30 U/L / GGT: x                 Active Medications  MEDICATIONS  (STANDING):  folic acid 1 milliGRAM(s) Oral daily  lactated ringers. 1000 milliLiter(s) (125 mL/Hr) IV Continuous <Continuous>  multivitamin 1 Tablet(s) Oral daily  pantoprazole    Tablet 40 milliGRAM(s) Oral every 12 hours  sodium bicarbonate 650 milliGRAM(s) Oral daily  sucralfate 1 Gram(s) Oral every 6 hours  thiamine 100 milliGRAM(s) Oral daily    MEDICATIONS  (PRN):  acetaminophen     Tablet .. 650 milliGRAM(s) Oral every 6 hours PRN Temp greater or equal to 38C (100.4F), Mild Pain (1 - 3)  aluminum hydroxide/magnesium hydroxide/simethicone Suspension 30 milliLiter(s) Oral every 4 hours PRN Dyspepsia  LORazepam   Injectable 2 milliGRAM(s) IV Push every 4 hours PRN Symptom-triggered: 2 point increase in CIWA -Ar score and a total score of 7 or LESS  melatonin 3 milliGRAM(s) Oral at bedtime PRN Insomnia  ondansetron Injectable 4 milliGRAM(s) IV Push every 8 hours PRN Nausea and/or Vomiting

## 2022-02-01 NOTE — PROVIDER CONTACT NOTE (CRITICAL VALUE NOTIFICATION) - PERSON GIVING RESULT:
"Coming in for S.O.B and hernia issues     Chief Complaint   Patient presents with     Breathing Problem     worse at night, blood pressure, with deep breath, / hernia       Initial BP (!) 154/92   Pulse 92   Temp 98.4  F (36.9  C) (Tympanic)   Resp 16   Wt 127 kg (280 lb)   SpO2 93%   BMI 35.00 kg/m   Estimated body mass index is 35 kg/m  as calculated from the following:    Height as of 7/26/18: 1.905 m (6' 3\").    Weight as of this encounter: 127 kg (280 lb).  Medication Reconciliation: complete    Jailyn Baker LPN  " Lab, [facility]

## 2022-02-01 NOTE — H&P ADULT - NSHPREVIEWOFSYSTEMS_GEN_ALL_CORE
Review of Systems:  General:denies fever chills, headache, weakness  HEENT: denies blurry vision,diffculty swallowing, difficulty hearing, tinnitus  Cardiovascular: denies chest pain  ,palpitations  Pulmonary:denies shortness of breath, cough, wheezing, hemoptysis  Gastrointestinal: ++abdominal pain, constipation, diarrhea,nausea , vomiting, hematochezia  : denies hematuria, dysuria, or incontinence  Neurological: denies weakness, numbness , tingling, dizziness, tremors  MSK: denies muscle pain, difficulty ambulating, swelling, back pain  skin: denies skin rash, itching, burning, or  skin lesions  Psychiatrical: denies mood disturbances, anxierty, feeling depressed, depression , or difficulty sleeping

## 2022-02-01 NOTE — H&P ADULT - NSHPPHYSICALEXAM_GEN_ALL_CORE
Objective:    Vitals:  T(C): 36.6 (02-01-22 @ 11:01), Max: 36.8 (02-01-22 @ 05:52)  HR: 53 (02-01-22 @ 11:01) (53 - 140)  BP: 132/70 (02-01-22 @ 11:01) (118/74 - 136/75)  RR: 18 (02-01-22 @ 11:01) (15 - 20)  SpO2: 99% (02-01-22 @ 11:01) (95% - 100%)    Physical Exam:  General: comfortable, no acute distress, well nourished  HEENT: Atraumatic, no LAD, trachea midline, PERRLA  Cardiovascular: normal s1s2, no murmurs, gallops or fricition rubs  Pulmonary: clear to ausculation Bilaterally, no wheezing , rhonchi  Gastrointestinal: soft non tender non distended, no masses felt, no organomegally  Muscloskeletal: no lower extremity edema, intact bilateral lower extremity pulses  Neurological: CN II-12 intact. No focal weakness  Psychiatrical: normal mood, cooperative  SKIN: no rash, lesions or ulcers

## 2022-02-01 NOTE — ED PROVIDER NOTE - CLINICAL SUMMARY MEDICAL DECISION MAKING FREE TEXT BOX
Patient with hx of withdrawal seizures, delirium tremens/prior ICU admits presentation of mild-moderate ETOH withdrawal vs gastritis/PUD/chronic abdominal pain vs AKA. Currently awake, alert, (+) Tachycardic, nausea/vomiting.    DDX: Wernicke's encephalopathy 2/2 thiamine deficiency  (i.e. ophthalmoplegia/nystagmus, ataxia, confusion/memory impairment), Korsakoff's Psychosis 2/2 thiamine deficiency, Wernicke-Korsakoff's syndrome, magnesium deficiency, delirium tremens (i.e. delirium/AMS, global confusion, agitation, autonomic hyperactivity: diaphoresis, tachycardia, tachypnea, hypertension, hyperthermia), alcoholic ketoacidosis; but unlikely given the HPI and exam. FSG normal.    - CBC, CMP,  Lipase,  APAP, ASA, ETOH; Cardiac monitoring for autonomic hyperactivity.  -   IVPB 1-2g MgSO4 PRN hypomagnesemic state, electrolyte repletion PRN  - CIWA Symptom Triggered: For Mild ETOH W/D: Escalating doses of Diazepam 10-40 mg IVP q5-10min (fast onset, w/ rapid peak effect 2-3 min, more lipophilic) until desired reduction in psychomotor agitation or RASS 0 to -1 or per CIWA protocol.   - For Severe BDZ-resistant ETOH W/D w/o hepatic ecephalopathy, HAART medications, hx of acute intermittent porphyria: Consider Phenobarbital 130-260mg [id number] PRN and intubation/propofol infusion.  - Disposition: likely admit

## 2022-02-01 NOTE — SBIRT NOTE ADULT - NSSBIRTALCPASSREFTXDET_GEN_A_CORE
Audit score was discussed with pt. He is minimizing problems assoc with etoh ; adding that when he drinks red wine "it's ok". He says he can stop when he wants and declined referral to programs.  Questions on how this strategy has been working for him lead to the comments about red wine. Pt was offered additional assistance should he change his mind.

## 2022-02-01 NOTE — ED PROVIDER NOTE - CRITICAL CARE ATTENDING CONTRIBUTION TO CARE
Upon my evaluation, this patient had a high probability of imminent or life-threatening deterioration due to EtOH withdrawal, which required my direct attention, intervention, and personal management.  The patient has a medical condition that impairs on or more vital organ systems.  Frequent personal assessment and adjustment of medical interventions was performed.     I have personally provided 35 minutes of critical care time exclusive of time spent on separately billable procedures.  Time includes review of laboratory data, radiology results, discussion with consultants, patient and family; monitoring for potential decompensation, as well as time spent retrieving data and reviewing the chart and documenting the visit.  Interventions were performed as documented above.

## 2022-02-01 NOTE — ED ADULT NURSE NOTE - OBJECTIVE STATEMENT
BIBA c/o of generalize ABD pain with nausea and vomiting. Pt has a hx of ETOH, however pt denies  any alcohol or drug use today.

## 2022-02-01 NOTE — H&P ADULT - ASSESSMENT
56y years old male with h/o Alcohol abuse with multiple/frequent admissions with severe w/d including ICU admissions in the past present to ED with complain of nausea, vomiting and abd pain for 3-4 days. CT abd/pelvis (image reviewed) with no acute pathology. No leukocytosis, Plt 254, K 3.3, Cr 1.05. admitted for recurrent alcoholic gastritis and detoxification for withdrawal    Epigastric discomfort likely alcoholic gastritis  CT abd with no acute pathology  IV PPI bid for now  Continue Carafate  Clear liquid diet and advance as tolerated.    Metabolic acidosis high lactic acid due to dehydration  bicarb repeated,resolved    Conrado likely prerenal due to dehydration  cr repeated improving    Alcohol withdrawal.   Reported last drink was around 2 days ago  CIWA protocol  IVF  thiamine, folic acid and multivitamin.    dvt ppx: lovenox

## 2022-02-01 NOTE — H&P ADULT - NSHPRISKHIVSCREEN_GEN_ALL_CORE
Patrient would like to know if she can get refill on her Medrol? It was prescribed by a different Dr but patient can't get ahold of him.  Also Dr. Alejo Chavez referred patient to a neurologist, patient was going to make appointment at [facility] but they don't have Unable to offer due to clinical condition

## 2022-02-01 NOTE — ED PROVIDER NOTE - PHYSICAL EXAMINATION
VITAL SIGNS: I have reviewed nursing notes and confirm.   GEN: Well-developed; well-nourished; in mild acute painful distress. Speaking full sentences. (+) vomiting in bag. (+) curled up in stretcher in fetal position.  SKIN: Warm, pink, no rash, no diaphoresis, no cyanosis, well perfused.   HEAD: Normocephalic; atraumatic. No scalp lacerations, no abrasions.  NECK: Supple; non tender.   EYES: Pupils 3mm equal, round, reactive to light and accomodation, conjunctiva and sclera clear. Extra-ocular movements intact bilaterally.  ENT: No nasal discharge; airway clear. Trachea is midline. ORAL: No oropharyngeal exudates or erythema. Normal dentition.  CV: (+) tachycardia, S1, S2 normal; no murmurs, gallops, or rubs. No lower extremity pitting edema bilaterally. Capillary refill < 2 seconds throughout. Distal pulses intact 2+ throughout.  RESP: CTA bilaterally. No wheezes, rales, or rhonchi.   ABD: Normal bowel sounds, soft, non-distended, non-tender, no rebound, no guarding, no rigidity, no hepatosplenomegaly. No CVA tenderness bilaterally.  MSK: Normal range of motion and movement of all 4 extremities. No joint or muscular pain throughout. No clubbing.   BACK: No thoracolumbar midline or paravertebral tenderness. No step-offs or obvious deformities.  NEURO: Alert & oriented x 3, Grossly unremarkable. Sensory and motor intact throughout. No focal deficits. Normal speech and coordination.   PSYCH: Cooperative, appropriate. VITAL SIGNS: I have reviewed nursing notes and confirm.   GEN: Well-developed; well-nourished; in mild acute painful distress. Speaking full sentences. (+) vomiting in bag. (+) curled up in stretcher in fetal position.  SKIN: Warm, pink, no rash, no diaphoresis, no cyanosis, well perfused.   HEAD: Normocephalic; atraumatic. No scalp lacerations, no abrasions.  NECK: Supple; non tender.   EYES: Pupils 3mm equal, round, reactive to light and accomodation, conjunctiva and sclera clear. Extra-ocular movements intact bilaterally.  ENT: No nasal discharge; airway clear. Trachea is midline. ORAL: No oropharyngeal exudates or erythema. Normal dentition.  CV: (+) tachycardia, S1, S2 normal; no murmurs, gallops, or rubs. No lower extremity pitting edema bilaterally. Capillary refill < 2 seconds throughout. Distal pulses intact 2+ throughout.  RESP: CTA bilaterally. No wheezes, rales, or rhonchi.   ABD: Normal bowel sounds, soft, non-distended, (+) TTP epigastric mild, no rebound, no guarding, no rigidity, no hepatosplenomegaly. No CVA tenderness bilaterally.  MSK: Normal range of motion and movement of all 4 extremities. No joint or muscular pain throughout. No clubbing.   BACK: No thoracolumbar midline or paravertebral tenderness. No step-offs or obvious deformities.  NEURO: Alert & oriented x 3, Grossly unremarkable. Sensory and motor intact throughout. No focal deficits. Normal speech and coordination.   PSYCH: Cooperative, appropriate.

## 2022-02-01 NOTE — ED PROVIDER NOTE - OBJECTIVE STATEMENT
56M PMHx of ETOH abuse (hx of w/d, seizures/ DTs), gastritis, PUD, HLD, substance abuse presenting with "pain all over body" today. Last drink was 2 days ago. Describes associated nausea/vomiting in the ED. Denies any fevers, chills, headaches, chest pain, shortness of breath, dysuria/hematuria, other drug use, hematemesis, hematochezia, melena.

## 2022-02-01 NOTE — ED PROVIDER NOTE - PROGRESS NOTE DETAILS
chart(s) Nadeem: Pt care signed out to me at change of shift. Pending CT AP. CT w/o acute pathology. CIWA 9. Rpt lactate 7 (down trending from 15), VS improved / WNL. Will admit to medicine (d/w Dr Singh) for EtOH withdrawal. Pt updated, understands / agrees w/ this plan.

## 2022-02-02 LAB
ANION GAP SERPL CALC-SCNC: 6 MMOL/L — SIGNIFICANT CHANGE UP (ref 5–17)
BUN SERPL-MCNC: 24 MG/DL — HIGH (ref 7–23)
CALCIUM SERPL-MCNC: 8.8 MG/DL — SIGNIFICANT CHANGE UP (ref 8.5–10.1)
CHLORIDE SERPL-SCNC: 111 MMOL/L — HIGH (ref 96–108)
CO2 SERPL-SCNC: 24 MMOL/L — SIGNIFICANT CHANGE UP (ref 22–31)
CREAT SERPL-MCNC: 1.25 MG/DL — SIGNIFICANT CHANGE UP (ref 0.5–1.3)
CULTURE RESULTS: SIGNIFICANT CHANGE UP
GLUCOSE SERPL-MCNC: 106 MG/DL — HIGH (ref 70–99)
LACTATE SERPL-SCNC: 2.1 MMOL/L — HIGH (ref 0.7–2)
MAGNESIUM SERPL-MCNC: 2.2 MG/DL — SIGNIFICANT CHANGE UP (ref 1.6–2.6)
PHOSPHATE SERPL-MCNC: 2.2 MG/DL — LOW (ref 2.5–4.5)
POTASSIUM SERPL-MCNC: 4.1 MMOL/L — SIGNIFICANT CHANGE UP (ref 3.5–5.3)
POTASSIUM SERPL-SCNC: 4.1 MMOL/L — SIGNIFICANT CHANGE UP (ref 3.5–5.3)
SODIUM SERPL-SCNC: 141 MMOL/L — SIGNIFICANT CHANGE UP (ref 135–145)
SPECIMEN SOURCE: SIGNIFICANT CHANGE UP

## 2022-02-02 PROCEDURE — 99233 SBSQ HOSP IP/OBS HIGH 50: CPT

## 2022-02-02 RX ORDER — POTASSIUM PHOSPHATE, MONOBASIC POTASSIUM PHOSPHATE, DIBASIC 236; 224 MG/ML; MG/ML
15 INJECTION, SOLUTION INTRAVENOUS ONCE
Refills: 0 | Status: COMPLETED | OUTPATIENT
Start: 2022-02-02 | End: 2022-02-02

## 2022-02-02 RX ADMIN — Medication 2 MILLIGRAM(S): at 17:37

## 2022-02-02 RX ADMIN — Medication 2 MILLIGRAM(S): at 23:57

## 2022-02-02 RX ADMIN — PANTOPRAZOLE SODIUM 40 MILLIGRAM(S): 20 TABLET, DELAYED RELEASE ORAL at 05:37

## 2022-02-02 RX ADMIN — Medication 1 TABLET(S): at 11:59

## 2022-02-02 RX ADMIN — Medication 1 GRAM(S): at 12:01

## 2022-02-02 RX ADMIN — Medication 1 GRAM(S): at 17:37

## 2022-02-02 RX ADMIN — Medication 3 MILLIGRAM(S): at 23:56

## 2022-02-02 RX ADMIN — Medication 650 MILLIGRAM(S): at 12:35

## 2022-02-02 RX ADMIN — PANTOPRAZOLE SODIUM 40 MILLIGRAM(S): 20 TABLET, DELAYED RELEASE ORAL at 17:37

## 2022-02-02 RX ADMIN — Medication 1 GRAM(S): at 01:30

## 2022-02-02 RX ADMIN — Medication 2 MILLIGRAM(S): at 05:38

## 2022-02-02 RX ADMIN — Medication 650 MILLIGRAM(S): at 11:35

## 2022-02-02 RX ADMIN — Medication 1 MILLIGRAM(S): at 11:58

## 2022-02-02 RX ADMIN — Medication 2 MILLIGRAM(S): at 13:43

## 2022-02-02 RX ADMIN — Medication 1 GRAM(S): at 05:38

## 2022-02-02 RX ADMIN — Medication 100 MILLIGRAM(S): at 12:00

## 2022-02-02 RX ADMIN — Medication 1 GRAM(S): at 23:57

## 2022-02-02 RX ADMIN — Medication 650 MILLIGRAM(S): at 05:46

## 2022-02-02 RX ADMIN — POTASSIUM PHOSPHATE, MONOBASIC POTASSIUM PHOSPHATE, DIBASIC 62.5 MILLIMOLE(S): 236; 224 INJECTION, SOLUTION INTRAVENOUS at 13:43

## 2022-02-02 RX ADMIN — Medication 650 MILLIGRAM(S): at 05:37

## 2022-02-02 NOTE — PATIENT PROFILE ADULT - PATIENT'S SEXUAL ORIENTATION
Oriented - self; Oriented - place; Oriented - time
24 year old woman with PMH cyclic vomiting for many years - s/p 3 endoscopies, presents with abdominal pain, and n/v since yesterday.  Patient will require admission for at least 2 midnights as detailed below:    IMPROVE VTE Individual Risk Assessment          RISK                                                          Points    [  ] Previous VTE                                                3    [  ] Thrombophilia                                             2    [  ] Lower limb paralysis                                    2        (unable to hold up >15 seconds)      [  ] Current Cancer                                             2         (within 6 months)    [ x ] Immobilization > 24 hrs                              1    [  ] ICU/CCU stay > 24 hours                            1    [  ] Age > 60                                                    1    IMPROVE VTE Score _____1____
Heterosexual

## 2022-02-02 NOTE — PATIENT PROFILE ADULT - FALL HARM RISK - FACTORS
CIWA protocol initiation less than 96 hours/Impaired gait/Other medical problems/Poor balance/Weakness/Other

## 2022-02-02 NOTE — PATIENT PROFILE ADULT - FALL HARM RISK - RISK INTERVENTIONS
Assistance OOB with selected safe patient handling equipment/Assistance with ambulation/Communicate Fall Risk and Risk Factors to all staff, patient, and family/Discuss with provider need for PT consult/Monitor for mental status changes/Monitor gait and stability/Provide patient with walking aids - walker, cane, crutches/Reinforce activity limits and safety measures with patient and family/Toileting schedule using arm’s reach rule for commode and bathroom/Use of alarms - bed, chair and/or voice tab/Visual Cue: Yellow wristband/Bed in lowest position, wheels locked, appropriate side rails in place/Call bell, personal items and telephone in reach/Instruct patient to call for assistance before getting out of bed or chair/Non-slip footwear when patient is out of bed/[place] to call system/Physically safe environment - no spills, clutter or unnecessary equipment/Purposeful Proactive Rounding/Room/bathroom lighting operational, light cord in reach

## 2022-02-03 LAB
ANION GAP SERPL CALC-SCNC: 9 MMOL/L — SIGNIFICANT CHANGE UP (ref 5–17)
BUN SERPL-MCNC: 12 MG/DL — SIGNIFICANT CHANGE UP (ref 7–23)
CALCIUM SERPL-MCNC: 9.2 MG/DL — SIGNIFICANT CHANGE UP (ref 8.5–10.1)
CHLORIDE SERPL-SCNC: 108 MMOL/L — SIGNIFICANT CHANGE UP (ref 96–108)
CO2 SERPL-SCNC: 22 MMOL/L — SIGNIFICANT CHANGE UP (ref 22–31)
CREAT SERPL-MCNC: 1.35 MG/DL — HIGH (ref 0.5–1.3)
GLUCOSE SERPL-MCNC: 152 MG/DL — HIGH (ref 70–99)
HCT VFR BLD CALC: 34.9 % — LOW (ref 39–50)
HGB BLD-MCNC: 10.8 G/DL — LOW (ref 13–17)
LACTATE SERPL-SCNC: 3.5 MMOL/L — HIGH (ref 0.7–2)
MAGNESIUM SERPL-MCNC: 2.8 MG/DL — HIGH (ref 1.6–2.6)
MCHC RBC-ENTMCNC: 23 PG — LOW (ref 27–34)
MCHC RBC-ENTMCNC: 30.9 G/DL — LOW (ref 32–36)
MCV RBC AUTO: 74.4 FL — LOW (ref 80–100)
NRBC # BLD: 0 /100 WBCS — SIGNIFICANT CHANGE UP (ref 0–0)
PHOSPHATE SERPL-MCNC: 2 MG/DL — LOW (ref 2.5–4.5)
PLATELET # BLD AUTO: 224 K/UL — SIGNIFICANT CHANGE UP (ref 150–400)
POTASSIUM SERPL-MCNC: 3.8 MMOL/L — SIGNIFICANT CHANGE UP (ref 3.5–5.3)
POTASSIUM SERPL-SCNC: 3.8 MMOL/L — SIGNIFICANT CHANGE UP (ref 3.5–5.3)
RBC # BLD: 4.69 M/UL — SIGNIFICANT CHANGE UP (ref 4.2–5.8)
RBC # FLD: 21.3 % — HIGH (ref 10.3–14.5)
SODIUM SERPL-SCNC: 139 MMOL/L — SIGNIFICANT CHANGE UP (ref 135–145)
WBC # BLD: 2.87 K/UL — LOW (ref 3.8–10.5)
WBC # FLD AUTO: 2.87 K/UL — LOW (ref 3.8–10.5)

## 2022-02-03 PROCEDURE — 99232 SBSQ HOSP IP/OBS MODERATE 35: CPT

## 2022-02-03 RX ORDER — POTASSIUM PHOSPHATE, MONOBASIC POTASSIUM PHOSPHATE, DIBASIC 236; 224 MG/ML; MG/ML
15 INJECTION, SOLUTION INTRAVENOUS ONCE
Refills: 0 | Status: COMPLETED | OUTPATIENT
Start: 2022-02-03 | End: 2022-02-03

## 2022-02-03 RX ORDER — FLUTICASONE PROPIONATE 50 MCG
1 SPRAY, SUSPENSION NASAL
Refills: 0 | Status: DISCONTINUED | OUTPATIENT
Start: 2022-02-03 | End: 2022-02-04

## 2022-02-03 RX ORDER — SODIUM CHLORIDE 9 MG/ML
1000 INJECTION, SOLUTION INTRAVENOUS ONCE
Refills: 0 | Status: COMPLETED | OUTPATIENT
Start: 2022-02-03 | End: 2022-02-03

## 2022-02-03 RX ADMIN — Medication 650 MILLIGRAM(S): at 23:34

## 2022-02-03 RX ADMIN — Medication 1 GRAM(S): at 05:45

## 2022-02-03 RX ADMIN — Medication 1 MILLIGRAM(S): at 11:27

## 2022-02-03 RX ADMIN — Medication 100 MILLIGRAM(S): at 11:27

## 2022-02-03 RX ADMIN — SODIUM CHLORIDE 1000 MILLILITER(S): 9 INJECTION, SOLUTION INTRAVENOUS at 14:21

## 2022-02-03 RX ADMIN — Medication 1 GRAM(S): at 18:35

## 2022-02-03 RX ADMIN — Medication 2 MILLIGRAM(S): at 05:45

## 2022-02-03 RX ADMIN — Medication 1 GRAM(S): at 23:34

## 2022-02-03 RX ADMIN — SODIUM CHLORIDE 125 MILLILITER(S): 9 INJECTION, SOLUTION INTRAVENOUS at 14:21

## 2022-02-03 RX ADMIN — POTASSIUM PHOSPHATE, MONOBASIC POTASSIUM PHOSPHATE, DIBASIC 62.5 MILLIMOLE(S): 236; 224 INJECTION, SOLUTION INTRAVENOUS at 18:34

## 2022-02-03 RX ADMIN — Medication 1 GRAM(S): at 11:27

## 2022-02-03 RX ADMIN — Medication 1 SPRAY(S): at 18:35

## 2022-02-03 RX ADMIN — Medication 2 MILLIGRAM(S): at 11:26

## 2022-02-03 RX ADMIN — PANTOPRAZOLE SODIUM 40 MILLIGRAM(S): 20 TABLET, DELAYED RELEASE ORAL at 18:35

## 2022-02-03 RX ADMIN — PANTOPRAZOLE SODIUM 40 MILLIGRAM(S): 20 TABLET, DELAYED RELEASE ORAL at 05:45

## 2022-02-03 RX ADMIN — Medication 1 TABLET(S): at 11:27

## 2022-02-04 ENCOUNTER — TRANSCRIPTION ENCOUNTER (OUTPATIENT)
Age: 57
End: 2022-02-04

## 2022-02-04 VITALS
HEART RATE: 84 BPM | OXYGEN SATURATION: 98 % | DIASTOLIC BLOOD PRESSURE: 81 MMHG | TEMPERATURE: 97 F | RESPIRATION RATE: 18 BRPM | SYSTOLIC BLOOD PRESSURE: 121 MMHG

## 2022-02-04 LAB
ANION GAP SERPL CALC-SCNC: 8 MMOL/L — SIGNIFICANT CHANGE UP (ref 5–17)
BUN SERPL-MCNC: 13 MG/DL — SIGNIFICANT CHANGE UP (ref 7–23)
CALCIUM SERPL-MCNC: 9 MG/DL — SIGNIFICANT CHANGE UP (ref 8.5–10.1)
CHLORIDE SERPL-SCNC: 109 MMOL/L — HIGH (ref 96–108)
CO2 SERPL-SCNC: 22 MMOL/L — SIGNIFICANT CHANGE UP (ref 22–31)
CREAT SERPL-MCNC: 1.21 MG/DL — SIGNIFICANT CHANGE UP (ref 0.5–1.3)
GLUCOSE SERPL-MCNC: 103 MG/DL — HIGH (ref 70–99)
LACTATE SERPL-SCNC: 1.8 MMOL/L — SIGNIFICANT CHANGE UP (ref 0.7–2)
PHOSPHATE SERPL-MCNC: 3.3 MG/DL — SIGNIFICANT CHANGE UP (ref 2.5–4.5)
POTASSIUM SERPL-MCNC: 3.4 MMOL/L — LOW (ref 3.5–5.3)
POTASSIUM SERPL-SCNC: 3.4 MMOL/L — LOW (ref 3.5–5.3)
SODIUM SERPL-SCNC: 139 MMOL/L — SIGNIFICANT CHANGE UP (ref 135–145)

## 2022-02-04 PROCEDURE — 99239 HOSP IP/OBS DSCHRG MGMT >30: CPT

## 2022-02-04 RX ORDER — POTASSIUM CHLORIDE 20 MEQ
40 PACKET (EA) ORAL ONCE
Refills: 0 | Status: COMPLETED | OUTPATIENT
Start: 2022-02-04 | End: 2022-02-04

## 2022-02-04 RX ADMIN — Medication 1 SPRAY(S): at 05:45

## 2022-02-04 RX ADMIN — Medication 1 GRAM(S): at 13:47

## 2022-02-04 RX ADMIN — Medication 1 MILLIGRAM(S): at 13:47

## 2022-02-04 RX ADMIN — Medication 650 MILLIGRAM(S): at 00:10

## 2022-02-04 RX ADMIN — Medication 1 GRAM(S): at 05:46

## 2022-02-04 RX ADMIN — Medication 40 MILLIEQUIVALENT(S): at 13:50

## 2022-02-04 RX ADMIN — Medication 1 TABLET(S): at 13:47

## 2022-02-04 RX ADMIN — Medication 100 MILLIGRAM(S): at 13:47

## 2022-02-04 RX ADMIN — PANTOPRAZOLE SODIUM 40 MILLIGRAM(S): 20 TABLET, DELAYED RELEASE ORAL at 05:45

## 2022-02-04 NOTE — PROGRESS NOTE ADULT - REASON FOR ADMISSION
Alcoholic gastritis and withdrawal

## 2022-02-04 NOTE — PROGRESS NOTE ADULT - PROBLEM SELECTOR PLAN 2
Some improvement in symptoms.   CT abd with no acute pathology  IV PPI bid, Maalox prn  Continue Carafate  Clear liquid diet and advance as tolerated
Some improvement in symptoms.   CT abd with no acute pathology  IV PPI bid, Maalox prn  Continue Carafate  Diet
Some improvement in symptoms.   CT abd with no acute pathology  IV PPI bid, Maalox prn  Continue Carafate  Clear liquid diet and advance as tolerated

## 2022-02-04 NOTE — PROGRESS NOTE ADULT - ASSESSMENT
56y years old male with h/o Alcohol abuse with multiple/frequent admissions with severe w/d including ICU admissions in the past present to ED with complain of nausea, vomiting and abd pain for 3-4 days  Tachycardic in ED. CT abd/pelvis (image reviewed) with no acute pathology. No leukocytosis, Plt 254, K 3.3, Cr 1.05, lipase 262. Tremulous in ED, improve with IV ativan.  Tachycardia improved with ativan and IV fluid.  Admitted with alcoholic gastritis, alcohol withdrawal    See below.  # Lactic acidosis - repeat lactate.  
56y years old male with h/o Alcohol abuse with multiple/frequent admissions with severe w/d including ICU admissions in the past present to ED with complain of nausea, vomiting and abd pain for 3-4 days  Tachycardic in ED. CT abd/pelvis (image reviewed) with no acute pathology. No leukocytosis, Plt 254, K 3.3, Cr 1.05, lipase 262. Tremulous in ED, improve with IV ativan.  Tachycardia improved with ativan and IV fluid.  Admitted with alcoholic gastritis, alcohol withdrawal    See below.  # Lactic acidosis - repeat lactate elevated.  IVF.   
56y years old male with h/o Alcohol abuse with multiple/frequent admissions with severe w/d including ICU admissions in the past present to ED with complain of nausea, vomiting and abd pain for 3-4 days  Tachycardic in ED. CT abd/pelvis (image reviewed) with no acute pathology. No leukocytosis, Plt 254, K 3.3, Cr 1.05, lipase 262. Tremulous in ED, improve with IV ativan.  Tachycardia improved with ativan and IV fluid.  Admitted with alcoholic gastritis, alcohol withdrawal    See below.  # Lactic acidosis - repeat lactate elevated.  IVF.

## 2022-02-04 NOTE — DISCHARGE NOTE PROVIDER - NSDCQMPCI_CARD_ALL_CORE
July 28, 2021     Ana Rinaldi   [address]  [place] [address]    Dear Ana Rinaldi:  We have received a referral from your primary care provider and or our records indicate that it is time for you to be scheduled for a colonoscopy and have attempted to contact you by telephone three times.  Please contact our office at [phone] to schedule this outpatient procedure.     These procedures are done on Monday,Tuesday, Wednesday and Friday mornings and you will need a responsible adult to drive you home after the procedure.     Covid testing is now required 2 to 3 days prior to these procedures. After Covid testing, we encourage you to self-quarantine to minimize risk of exposure, but if you can't, we recommend you wear a mask, social distance and practice good hand-washing hygiene prior to your procedure. If you have had both of the covid vaccines and are not having any symptoms you do not need the pre procedure covid test.  If you would prefer not to schedule at this time, please notify our office so we may remove you from our call list.  If you have chosen to complete your screening colonoscopy outside of the [facility] system, please contact our office so we can update your health records.    We look forward to hear from you soon.    Sign up for MY ADVOCATE  YANCY / LIVE WELL now. Manage your health care for you and your family anytime, anywhere with Radha. My Yancy is your free, online resource for quick and easy access to personal health information, scheduling appointments, refilling prescriptions, viewing test results, paying bills and more. Sign up at [email or web address]  [employer] Support line [phone]       Esequiel Woodard MD, FASCMarshfield Clinic [facility], [facility]  General/[place]-Rectal Surgery Dept  [address] [place]  [address]  Phone:  [phone]  
    May 13, 2021     Ana Rinaldi   [facility] [place] [address]    Dear Ana Rinaldi:  This letter is to inform you it is time to schedule your follow up Colonoscopy with  Dr. Esequiel Woodard, [place]-Rectal Surgeon at [facility].    Your last colonoscopy was 7/22/16 with recommendations to follow up in 5 years due to your colon polyp history.    Please note that a Covid 19 test will need to be done 2 to 3 days prior to your scheduled procedure. After Covid testing, we encourage you to self-quarantine to minimize risk of exposure, but if you can't, we recommend you wear a mask, social distance and practice good hand-washing hygiene prior to your procedure.     If you have chosen to have your testing at another facility or with another provider please give our office a call so we can update our records or to schedule your follow up colonoscopy appointment.      Dr. Woodard does these procedures on Monday,Tuesday, Wednesday, Thursday and Friday mornings. You will need to have a  to and from the surgery center and recommend an adult to stay with you 24 hours after this procedure due to the anesthesia.    We look forward to hearing from you soon.    Sign up for MY ADVOCATE  Dale/ LIVE WELL now. Manage your health care for you and your family anytime, anywhere with Radha. My Yancy is your free, online resource for quick and easy access to personal health information, scheduling appointments, refilling prescriptions, viewing test results, paying bills and more. Sign up at [email or web address]       Esequiel Woodard MD, [facility], [facility]  General/[place]-Rectal Surgery Dept  [address] [place] [address] [address] [place]  [address]  Phone:  [phone]  
No

## 2022-02-04 NOTE — PROGRESS NOTE ADULT - PROBLEM SELECTOR PLAN 1
Tremulous in ED, tachycardic, improve with IV ativan  Reported last drink was around 3 days PTA  CIWA protocol with ativan taper  IVF  thiamine, folic acid and multivitamin

## 2022-02-04 NOTE — DISCHARGE NOTE PROVIDER - NSDCCPCAREPLAN_GEN_ALL_CORE_FT
PRINCIPAL DISCHARGE DIAGNOSIS  Diagnosis: Alcohol withdrawal with inpatient treatment, uncomplicated  Assessment and Plan of Treatment:       SECONDARY DISCHARGE DIAGNOSES  Diagnosis: Alcoholic gastritis  Assessment and Plan of Treatment:     Diagnosis: Hypokalemia  Assessment and Plan of Treatment:     Diagnosis: Hypophosphatemia  Assessment and Plan of Treatment:

## 2022-02-04 NOTE — DISCHARGE NOTE PROVIDER - HOSPITAL COURSE
56y years old male with h/o Alcohol abuse with multiple/frequent admissions with severe w/d including ICU admissions in the past present to ED with complain of nausea, vomiting and abd pain for 3-4 days  Tachycardic in ED. CT abd/pelvis (image reviewed) with no acute pathology. No leukocytosis, Plt 254, K 3.3, Cr 1.05, lipase 262. Tremulous in ED, improve with IV ativan.  Tachycardia improved with ativan and IV fluid.  Admitted with alcoholic gastritis, alcohol withdrawal    See below.  # Lactic acidosis - repeat lactate wnl.  IVF.       Problem/Plan - 1:  ·  Problem: Alcohol withdrawal.   ·  Plan: Tremulous in ED, tachycardic, improve with IV ativan  Reported last drink was around 3 days PTA  CIWA protocol with ativan taper  IVF  thiamine, folic acid and multivitamin.    Problem/Plan - 2:  ·  Problem: Acute alcoholic gastritis.   ·  Plan: Some improvement in symptoms.   CT abd with no acute pathology, PPI on discharge.   Continue Carafate  Diet.    Problem/Plan - 3:  ·  Problem: Intractable nausea and vomiting.   ·  Plan: Likely due to alcoholic gastritis.  Symptoms controlled.    IV fluid  Zofran prn.    Problem/Plan - 4:  ·  Problem: Hypokalemia.   ·  Plan: Supplement lytes, monitor BMP.      stable for discharge.

## 2022-02-04 NOTE — DISCHARGE NOTE NURSING/CASE MANAGEMENT/SOCIAL WORK - NSDCPEFALRISK_GEN_ALL_CORE
For information on Fall & Injury Prevention, visit: [email or web address] OR  [email or web address] OR  https://www.cdc.gov/steadi/patient.html

## 2022-02-04 NOTE — DISCHARGE NOTE NURSING/CASE MANAGEMENT/SOCIAL WORK - PATIENT PORTAL LINK FT
You can access the FollowMyHealth Patient Portal offered by [facility] by registering at the following website: [email or web address]. By joining [employer]’[facility] FollowMyHealth portal, you will also be able to view your health information using other applications (apps) compatible with our system.

## 2022-02-04 NOTE — DISCHARGE NOTE PROVIDER - NSDCFUADDINST_GEN_ALL_CORE_FT
It is important to see your primary physician as well as the physicians noted below within the next week to perform a comprehensive medical review.  Call their offices for an appointment as soon as you leave the hospital.  You will also need to see them for renewal of your medications.  If you do not have a primary physician, contact the [facility] Physician Referral Service at [phone].  To obtain your results, you can access the [employer] Patient Portal at [email or web address].  Your medical issues appear to be stable at this time, but if your symptoms recur or worsen, contact your physicians and/or return to the hospital if necessary.  If you encounter any issues or questions with your medication, call your physicians before stopping the medication.  Do not drive.  Limit your diet to 2 grams of sodium daily.

## 2022-02-04 NOTE — PROGRESS NOTE ADULT - PROBLEM SELECTOR PLAN 3
Likely due to alcoholic gastritis  IV fluid  Zofran prn
Likely due to alcoholic gastritis.  Symptoms controlled.    IV fluid  Zofran prn
Likely due to alcoholic gastritis.  Symptoms controlled.    IV fluid  Zofran prn

## 2022-02-04 NOTE — DISCHARGE NOTE PROVIDER - NSDCMRMEDTOKEN_GEN_ALL_CORE_FT
folic acid 1 mg oral tablet: 1 tab(s) orally once a day  Multiple Vitamins oral tablet: 1 tab(s) orally once a day  Multiple Vitamins oral tablet: 1 tab(s) orally once a day  Protonix 40 mg oral delayed release tablet: 1 tab(s) orally 2 times a day   sucralfate 1 g oral tablet: 1 tab(s) orally 4 times a day  thiamine 100 mg oral tablet: 1 tab(s) orally once a day   thiamine 100 mg oral tablet: 1 tab(s) orally once a day

## 2022-02-07 NOTE — PATIENT PROFILE ADULT - ABILITY TO HEAR (WITH HEARING AID OR HEARING APPLIANCE IF NORMALLY USED):
Rick Rice--    Patient is overdue for colonoscopy 3 year recall. Attached previous operative and pathology report below for review. Reconciled medications, preferred pharmacy and allergies. No current GI related symptoms. Last Procedure, Date, MD: Colonoscopy with Dr. Tanisha Bass 06/02/2018   Last Diagnosis: Colon polyps; Diverticulosis left colon     Recalled for (mth/yrs): 3 year   Sedation used previously: IV     Last Prep Used (if known): Suprep    Quality of prep (if known): Good    Anticoagulants: No   Diabetic Meds: No    BP meds(Ace inhibitors/ARB's): Losartan-hydrochlorothiazide   Weight loss meds (phentermine/vyvanse): No   Iron supplement (RX/OTC): No   Height & Weight/BMI: 5'5\"; 220 LB; BMI: 36.6   Hx of Cardiac/CVA issues/(MI/Stroke): No   Devices Pacemaker/Defibrillator/Stents: No   Resp. Issues/Oxygen Use/TANYA/COPD: No   Issues w/Anesthesia: No     Symptoms (Y/N): No   Symptoms Details: N/A     Special comments/notes: None     Please advise on orders and prep. Thank you! Adequate: hears normal conversation without difficulty

## 2022-02-07 NOTE — BEHAVIORAL HEALTH ASSESSMENT NOTE - DETAILS
charted hx does not indicate any suicidality; Pt denies hx of suicidal attempts/gestures/ideation 5-Fu Counseling: 5-Fluorouracil Counseling:  I discussed with the patient the risks of 5-fluorouracil including but not limited to erythema, scaling, itching, weeping, crusting, and pain.

## 2022-02-08 DIAGNOSIS — K27.9 PEPTIC ULCER, SITE UNSPECIFIED, UNSPECIFIED AS ACUTE OR CHRONIC, WITHOUT HEMORRHAGE OR PERFORATION: ICD-10-CM

## 2022-02-08 DIAGNOSIS — E87.6 HYPOKALEMIA: ICD-10-CM

## 2022-02-08 DIAGNOSIS — E78.2 MIXED HYPERLIPIDEMIA: ICD-10-CM

## 2022-02-08 DIAGNOSIS — E86.0 DEHYDRATION: ICD-10-CM

## 2022-02-08 DIAGNOSIS — R00.0 TACHYCARDIA, UNSPECIFIED: ICD-10-CM

## 2022-02-08 DIAGNOSIS — E87.2 ACIDOSIS: ICD-10-CM

## 2022-02-08 DIAGNOSIS — E51.9 THIAMINE DEFICIENCY, UNSPECIFIED: ICD-10-CM

## 2022-02-08 DIAGNOSIS — N17.9 ACUTE KIDNEY FAILURE, UNSPECIFIED: ICD-10-CM

## 2022-02-08 DIAGNOSIS — F19.19 OTHER PSYCHOACTIVE SUBSTANCE ABUSE WITH UNSPECIFIED PSYCHOACTIVE SUBSTANCE-INDUCED DISORDER: ICD-10-CM

## 2022-02-08 DIAGNOSIS — K29.20 ALCOHOLIC GASTRITIS WITHOUT BLEEDING: ICD-10-CM

## 2022-02-08 DIAGNOSIS — F10.239 ALCOHOL DEPENDENCE WITH WITHDRAWAL, UNSPECIFIED: ICD-10-CM

## 2022-02-08 DIAGNOSIS — R11.10 VOMITING, UNSPECIFIED: ICD-10-CM

## 2022-02-28 ENCOUNTER — INPATIENT (INPATIENT)
Facility: HOSPITAL | Age: 57
LOS: 1 days | Discharge: HOME | End: 2022-03-02
Attending: FAMILY MEDICINE | Admitting: FAMILY MEDICINE
Payer: MEDICAID

## 2022-02-28 VITALS
HEIGHT: 67 IN | DIASTOLIC BLOOD PRESSURE: 92 MMHG | WEIGHT: 149.91 LBS | OXYGEN SATURATION: 99 % | HEART RATE: 122 BPM | RESPIRATION RATE: 20 BRPM | SYSTOLIC BLOOD PRESSURE: 132 MMHG | TEMPERATURE: 99 F

## 2022-02-28 DIAGNOSIS — K29.20 ALCOHOLIC GASTRITIS WITHOUT BLEEDING: ICD-10-CM

## 2022-02-28 DIAGNOSIS — F10.230 ALCOHOL DEPENDENCE WITH WITHDRAWAL, UNCOMPLICATED: ICD-10-CM

## 2022-02-28 LAB
ACANTHOCYTES BLD QL SMEAR: SLIGHT — SIGNIFICANT CHANGE UP
ALBUMIN SERPL ELPH-MCNC: 4.2 G/DL — SIGNIFICANT CHANGE UP (ref 3.3–5)
ALP SERPL-CCNC: 59 U/L — SIGNIFICANT CHANGE UP (ref 40–120)
ALT FLD-CCNC: 28 U/L — SIGNIFICANT CHANGE UP (ref 12–78)
ANION GAP SERPL CALC-SCNC: 14 MMOL/L — SIGNIFICANT CHANGE UP (ref 5–17)
AST SERPL-CCNC: 30 U/L — SIGNIFICANT CHANGE UP (ref 15–37)
BASOPHILS # BLD AUTO: 0.06 K/UL — SIGNIFICANT CHANGE UP (ref 0–0.2)
BASOPHILS NFR BLD AUTO: 1.1 % — SIGNIFICANT CHANGE UP (ref 0–2)
BILIRUB SERPL-MCNC: 0.4 MG/DL — SIGNIFICANT CHANGE UP (ref 0.2–1.2)
BUN SERPL-MCNC: 18 MG/DL — SIGNIFICANT CHANGE UP (ref 7–23)
CALCIUM SERPL-MCNC: 9.1 MG/DL — SIGNIFICANT CHANGE UP (ref 8.5–10.1)
CHLORIDE SERPL-SCNC: 108 MMOL/L — SIGNIFICANT CHANGE UP (ref 96–108)
CO2 SERPL-SCNC: 21 MMOL/L — LOW (ref 22–31)
CREAT SERPL-MCNC: 1.6 MG/DL — HIGH (ref 0.5–1.3)
EGFR: 51 ML/MIN/1.73M2 — LOW
ELLIPTOCYTES BLD QL SMEAR: SLIGHT — SIGNIFICANT CHANGE UP
EOSINOPHIL # BLD AUTO: 0.1 K/UL — SIGNIFICANT CHANGE UP (ref 0–0.5)
EOSINOPHIL NFR BLD AUTO: 1.8 % — SIGNIFICANT CHANGE UP (ref 0–6)
ETHANOL SERPL-MCNC: 217 MG/DL — HIGH (ref 0–10)
FLUAV AG NPH QL: SIGNIFICANT CHANGE UP
FLUBV AG NPH QL: SIGNIFICANT CHANGE UP
GLUCOSE SERPL-MCNC: 124 MG/DL — HIGH (ref 70–99)
HCT VFR BLD CALC: 42.2 % — SIGNIFICANT CHANGE UP (ref 39–50)
HGB BLD-MCNC: 13.3 G/DL — SIGNIFICANT CHANGE UP (ref 13–17)
IMM GRANULOCYTES NFR BLD AUTO: 0.4 % — SIGNIFICANT CHANGE UP (ref 0–1.5)
LIDOCAIN IGE QN: 155 U/L — SIGNIFICANT CHANGE UP (ref 73–393)
LYMPHOCYTES # BLD AUTO: 2.81 K/UL — SIGNIFICANT CHANGE UP (ref 1–3.3)
LYMPHOCYTES # BLD AUTO: 49.7 % — HIGH (ref 13–44)
MAGNESIUM SERPL-MCNC: 2.1 MG/DL — SIGNIFICANT CHANGE UP (ref 1.6–2.6)
MANUAL SMEAR VERIFICATION: YES — SIGNIFICANT CHANGE UP
MCHC RBC-ENTMCNC: 23.1 PG — LOW (ref 27–34)
MCHC RBC-ENTMCNC: 31.5 G/DL — LOW (ref 32–36)
MCV RBC AUTO: 73.1 FL — LOW (ref 80–100)
MICROCYTES BLD QL: SLIGHT — SIGNIFICANT CHANGE UP
MONOCYTES # BLD AUTO: 0.36 K/UL — SIGNIFICANT CHANGE UP (ref 0–0.9)
MONOCYTES NFR BLD AUTO: 6.4 % — SIGNIFICANT CHANGE UP (ref 2–14)
NEUTROPHILS # BLD AUTO: 2.3 K/UL — SIGNIFICANT CHANGE UP (ref 1.8–7.4)
NEUTROPHILS NFR BLD AUTO: 40.6 % — LOW (ref 43–77)
NRBC # BLD: 0 /100 WBCS — SIGNIFICANT CHANGE UP (ref 0–0)
OVALOCYTES BLD QL SMEAR: SIGNIFICANT CHANGE UP
PLAT MORPH BLD: NORMAL — SIGNIFICANT CHANGE UP
PLATELET # BLD AUTO: 399 K/UL — SIGNIFICANT CHANGE UP (ref 150–400)
PLATELET COUNT - ESTIMATE: ABNORMAL
POLYCHROMASIA BLD QL SMEAR: SLIGHT — SIGNIFICANT CHANGE UP
POTASSIUM SERPL-MCNC: 3.5 MMOL/L — SIGNIFICANT CHANGE UP (ref 3.5–5.3)
POTASSIUM SERPL-SCNC: 3.5 MMOL/L — SIGNIFICANT CHANGE UP (ref 3.5–5.3)
PROT SERPL-MCNC: 9.2 GM/DL — HIGH (ref 6–8.3)
RBC # BLD: 5.77 M/UL — SIGNIFICANT CHANGE UP (ref 4.2–5.8)
RBC # FLD: 21.2 % — HIGH (ref 10.3–14.5)
RBC BLD AUTO: ABNORMAL
SARS-COV-2 RNA SPEC QL NAA+PROBE: SIGNIFICANT CHANGE UP
SODIUM SERPL-SCNC: 143 MMOL/L — SIGNIFICANT CHANGE UP (ref 135–145)
TARGETS BLD QL SMEAR: SLIGHT — SIGNIFICANT CHANGE UP
WBC # BLD: 5.65 K/UL — SIGNIFICANT CHANGE UP (ref 3.8–10.5)
WBC # FLD AUTO: 5.65 K/UL — SIGNIFICANT CHANGE UP (ref 3.8–10.5)

## 2022-02-28 PROCEDURE — 99222 1ST HOSP IP/OBS MODERATE 55: CPT

## 2022-02-28 PROCEDURE — 71045 X-RAY EXAM CHEST 1 VIEW: CPT | Mod: 26

## 2022-02-28 PROCEDURE — 99291 CRITICAL CARE FIRST HOUR: CPT

## 2022-02-28 PROCEDURE — 74177 CT ABD & PELVIS W/CONTRAST: CPT | Mod: 26,MA

## 2022-02-28 PROCEDURE — 93010 ELECTROCARDIOGRAM REPORT: CPT

## 2022-02-28 RX ORDER — FOLIC ACID 0.8 MG
1 TABLET ORAL ONCE
Refills: 0 | Status: COMPLETED | OUTPATIENT
Start: 2022-02-28 | End: 2022-02-28

## 2022-02-28 RX ORDER — THIAMINE MONONITRATE (VIT B1) 100 MG
100 TABLET ORAL ONCE
Refills: 0 | Status: COMPLETED | OUTPATIENT
Start: 2022-02-28 | End: 2022-02-28

## 2022-02-28 RX ORDER — SODIUM CHLORIDE 9 MG/ML
1000 INJECTION, SOLUTION INTRAVENOUS ONCE
Refills: 0 | Status: COMPLETED | OUTPATIENT
Start: 2022-02-28 | End: 2022-02-28

## 2022-02-28 RX ORDER — PHENOBARBITAL 60 MG
130 TABLET ORAL
Refills: 0 | Status: DISCONTINUED | OUTPATIENT
Start: 2022-02-28 | End: 2022-03-02

## 2022-02-28 RX ORDER — MORPHINE SULFATE 50 MG/1
4 CAPSULE, EXTENDED RELEASE ORAL ONCE
Refills: 0 | Status: DISCONTINUED | OUTPATIENT
Start: 2022-02-28 | End: 2022-02-28

## 2022-02-28 RX ORDER — ONDANSETRON 8 MG/1
4 TABLET, FILM COATED ORAL ONCE
Refills: 0 | Status: COMPLETED | OUTPATIENT
Start: 2022-02-28 | End: 2022-02-28

## 2022-02-28 RX ORDER — FOLIC ACID 0.8 MG
1 TABLET ORAL DAILY
Refills: 0 | Status: DISCONTINUED | OUTPATIENT
Start: 2022-02-28 | End: 2022-03-02

## 2022-02-28 RX ORDER — ONDANSETRON 8 MG/1
4 TABLET, FILM COATED ORAL EVERY 6 HOURS
Refills: 0 | Status: DISCONTINUED | OUTPATIENT
Start: 2022-02-28 | End: 2022-03-02

## 2022-02-28 RX ORDER — SODIUM CHLORIDE 9 MG/ML
1000 INJECTION, SOLUTION INTRAVENOUS ONCE
Refills: 0 | Status: COMPLETED | OUTPATIENT
Start: 2022-02-28 | End: 2022-03-01

## 2022-02-28 RX ORDER — THIAMINE MONONITRATE (VIT B1) 100 MG
100 TABLET ORAL DAILY
Refills: 0 | Status: DISCONTINUED | OUTPATIENT
Start: 2022-02-28 | End: 2022-03-02

## 2022-02-28 RX ORDER — SODIUM CHLORIDE 9 MG/ML
1000 INJECTION INTRAMUSCULAR; INTRAVENOUS; SUBCUTANEOUS ONCE
Refills: 0 | Status: COMPLETED | OUTPATIENT
Start: 2022-02-28 | End: 2022-02-28

## 2022-02-28 RX ORDER — SUCRALFATE 1 G
1 TABLET ORAL
Refills: 0 | Status: DISCONTINUED | OUTPATIENT
Start: 2022-02-28 | End: 2022-03-02

## 2022-02-28 RX ORDER — MORPHINE SULFATE 50 MG/1
2 CAPSULE, EXTENDED RELEASE ORAL EVERY 6 HOURS
Refills: 0 | Status: DISCONTINUED | OUTPATIENT
Start: 2022-02-28 | End: 2022-03-02

## 2022-02-28 RX ORDER — HEPARIN SODIUM 5000 [USP'U]/ML
5000 INJECTION INTRAVENOUS; SUBCUTANEOUS EVERY 12 HOURS
Refills: 0 | Status: DISCONTINUED | OUTPATIENT
Start: 2022-02-28 | End: 2022-03-02

## 2022-02-28 RX ORDER — PANTOPRAZOLE SODIUM 20 MG/1
40 TABLET, DELAYED RELEASE ORAL ONCE
Refills: 0 | Status: COMPLETED | OUTPATIENT
Start: 2022-02-28 | End: 2022-02-28

## 2022-02-28 RX ADMIN — SODIUM CHLORIDE 1000 MILLILITER(S): 9 INJECTION INTRAMUSCULAR; INTRAVENOUS; SUBCUTANEOUS at 18:21

## 2022-02-28 RX ADMIN — Medication 1 MILLIGRAM(S): at 18:28

## 2022-02-28 RX ADMIN — Medication 100 MILLIGRAM(S): at 18:28

## 2022-02-28 RX ADMIN — MORPHINE SULFATE 4 MILLIGRAM(S): 50 CAPSULE, EXTENDED RELEASE ORAL at 22:59

## 2022-02-28 RX ADMIN — ONDANSETRON 4 MILLIGRAM(S): 8 TABLET, FILM COATED ORAL at 18:16

## 2022-02-28 RX ADMIN — PANTOPRAZOLE SODIUM 40 MILLIGRAM(S): 20 TABLET, DELAYED RELEASE ORAL at 18:15

## 2022-02-28 RX ADMIN — Medication 2 MILLIGRAM(S): at 18:17

## 2022-02-28 RX ADMIN — MORPHINE SULFATE 4 MILLIGRAM(S): 50 CAPSULE, EXTENDED RELEASE ORAL at 18:28

## 2022-02-28 RX ADMIN — Medication 50 MILLIGRAM(S): at 18:28

## 2022-02-28 RX ADMIN — MORPHINE SULFATE 4 MILLIGRAM(S): 50 CAPSULE, EXTENDED RELEASE ORAL at 19:41

## 2022-02-28 RX ADMIN — SODIUM CHLORIDE 1000 MILLILITER(S): 9 INJECTION, SOLUTION INTRAVENOUS at 22:24

## 2022-02-28 RX ADMIN — MORPHINE SULFATE 4 MILLIGRAM(S): 50 CAPSULE, EXTENDED RELEASE ORAL at 20:11

## 2022-02-28 NOTE — H&P ADULT - HISTORY OF PRESENT ILLNESS
Patient is a 56M with a PMH of chronic ETOH abuse with hx of alcohol withdrawal and DTs with frequent hospital admissions, alcoholic gastritis/esophagitis, PUD, HLD, hx of hypoxic respiratory failure requiring intubation due to aspiration PNA who presents to the ED for abdominal pain and vomiting.  Reports moderate, diffuse abdominal pain.  States that he is unable to tolerate PO intake.  Reports his last drink was two days ago.  Patient has no other complaints.  Tachycardic in ED to 128, labs benign.  Will admit to tele.

## 2022-02-28 NOTE — H&P ADULT - ASSESSMENT
Patient is a 56M with a PMH of chronic ETOH abuse with hx of alcohol withdrawal and DTs with frequent hospital admissions, alcoholic gastritis/esophagitis, PUD, HLD, hx of hypoxic respiratory failure requiring intubation due to aspiration PNA who presents to the ED for abdominal pain and vomiting.  Reports moderate, diffuse abdominal pain.  States that he is unable to tolerate PO intake.  Reports his last drink was two days ago.  Patient has no other complaints.  Tachycardic in ED to 128, labs benign.  Will admit to tele.       IMPROVE VTE Individual Risk Assessment          RISK                                                          Points  [  ] Previous VTE                                                3  [  ] Thrombophilia                                             2  [  ] Lower limb paralysis                                    2        (unable to hold up >15 seconds)    [  ] Current Cancer                                             2         (within 6 months)  [  ] Immobilization > 24 hrs                              1  [  ] ICU/CCU stay > 24 hours                            1  [  ] Age > 60                                                    1    IMPROVE VTE Score - 1

## 2022-02-28 NOTE — H&P ADULT - PROBLEM SELECTOR PLAN 1
CECILIA currently 7, recently received ativan  Will continue withdrawal protocol with Librium 50 q8 standing  phenobarbital PRN for acute withdrawal symptoms  Thiamine, Folate, MVI daily

## 2022-02-28 NOTE — ED ADULT NURSE NOTE - OBJECTIVE STATEMENT
Patient received with pain to abdomen, vomiting dark brown emesis, pt  with HX of ETOH, pt voiced last drink was yesterday. pt screaming, throwing self on the floor crying yelling for pain medication. Staff reassure pt pain meds will given but needs to remain in bed for safety.

## 2022-02-28 NOTE — ED ADULT NURSE REASSESSMENT NOTE - NS ED NURSE REASSESS COMMENT FT1
Pt returned from CT, No acute distress noted. R hand IV access infiltrated, Dr. Sood made aware. As per Dr. Sood ok to obtain IV access on foot. IV access placed by ANM Franco on L foot. Pt tolerated well. No infiltration, redness, swelling noted at IV site. Pt denies pain at IV site. Will continue to monitor.

## 2022-02-28 NOTE — ED PROVIDER NOTE - CONSTITUTIONAL, MLM
normal... Well appearing, awake, alert, oriented to person, place, time/situation and in no apparent distress. Speaking in clear full sentences spitting up nonbloody mucus, no nasal flaring no shoulders retractions no diaphoresis

## 2022-02-28 NOTE — H&P ADULT - NSHPPHYSICALEXAM_GEN_ALL_CORE
Physical exam:  General: patient in no acute distress, resting comfortably  Head:  Atraumatic, Normocephalic  Eyes: EOMI, PERRLA, clear sclera  Neck: Supple, thyroid nontender, non enlarged  Cardio: S1/S2 +ve, regular rate and rhythm, no M/G/R  Resp: clear to ausculation bilaterally, no rales or wheezes  GI: abdomen soft, nontender, non distended, no guarding, BS +ve x 4  Ext: no significant pedal edema  Neuro: CN 2-12 intact, no significant motor or sensory deficits. CIWA 7 currently   Skin: No rashes or lesions

## 2022-02-28 NOTE — ED ADULT NURSE NOTE - ED STAT RN HANDOFF DETAILS 2
Report endorsed to ED Hold PRETTY Ralph. Safety checks completed this shift. Safety rounds completed hourly.  IV sites checked Q2+remains WDL. Medications administered as ordered with no signs/symptoms of adverse reactions. Fall & skin precautions in place. Any issues endorsed to ED Hold PRETTY Ralph for follow up.

## 2022-02-28 NOTE — ED PROVIDER NOTE - OBJECTIVE STATEMENT
56 years old male here c/o vomiting bilious vomitus all day  Pt admits drinks more than one pine per day last drink was yesterday. Pt denies headache, dizziness, blurred visions, light sensitivities, focal/distal weakness or numbness, neck/back/calfs pain, cough, sob, chest pain, fever, chills, dysuria or irregular bowel movements.

## 2022-02-28 NOTE — ED ADULT NURSE NOTE - NSIMPLEMENTINTERV_GEN_ALL_ED
Implemented All Fall with Harm Risk Interventions:  [place] to call system. Call bell, personal items and telephone within reach. Instruct patient to call for assistance. Room bathroom lighting operational. Non-slip footwear when patient is off stretcher. Physically safe environment: no spills, clutter or unnecessary equipment. Stretcher in lowest position, wheels locked, appropriate side rails in place. Provide visual cue, wrist band, yellow gown, etc. Monitor gait and stability. Monitor for mental status changes and reorient to person, place, and time. Review medications for side effects contributing to fall risk. Reinforce activity limits and safety measures with patient and family. Provide visual clues: red socks.

## 2022-02-28 NOTE — ED ADULT NURSE REASSESSMENT NOTE - NS ED NURSE REASSESS COMMENT FT1
Pt pending CT abdomen and pelvis with IV contrast. Dr. Sood made aware of Creatinine 1.60. As per Dr. Sood ok for pt to have CT with IV contrast.

## 2022-02-28 NOTE — ED PROVIDER NOTE - CLINICAL SUMMARY MEDICAL DECISION MAKING FREE TEXT BOX
hx, exam, ciwa 13 pt's labs and ct of abd/pelvis and care are signed out to the next team hx, exam, ciwa 13 pt's labs and ct of abd/pelvis and care are signed out to the next team    AA- Patient signed out at 1900, gentleman with hx of alcohol abuse presents with vomiting and alcohol withdrawal CIWA of 13, labs and CT abdomen and pelvis pending. At bedside, patient is resting comfortably, O2 sat above 93%, tachycardic to 125. Will give more fluids and continue to monitor hx, exam, ciwa 13 pt's labs and ct of abd/pelvis and care are signed out to the next team    AA- Patient signed out at 1900, gentleman with hx of alcohol abuse presents with vomiting and alcohol withdrawal CIWA of 13, labs and CT abdomen and pelvis pending. At bedside, patient is resting comfortably, O2 sat above 93%, tachycardic to 125. Will give more fluids and continue to monitor    Patient remains tachycardic s/p fluids in the ED, requiring morphine for pain control, vomiting improved, tachycardia to low 100s, worse with any movement up brings it up to the 120s, still complaining of withdrawal symptoms. Spoke to hospitalist Dr. Salazar who will accept patient for admission for alcohol withdrawal with ROSA.

## 2022-02-28 NOTE — H&P ADULT - NSHPLABSRESULTS_GEN_ALL_CORE
Recent Vitals  T(C): 37.4 (02-28-22 @ 19:23), Max: 37.4 (02-28-22 @ 19:23)  HR: 100 (02-28-22 @ 22:58) (100 - 128)  BP: 141/86 (02-28-22 @ 22:58) (118/80 - 141/86)  RR: 18 (02-28-22 @ 22:58) (17 - 20)  SpO2: 98% (02-28-22 @ 22:58) (96% - 99%)                        13.3   5.65  )-----------( 399      ( 28 Feb 2022 18:50 )             42.2     02-28    143  |  108  |  18  ----------------------------<  124<H>  3.5   |  21<L>  |  1.60<H>    Ca    9.1      28 Feb 2022 18:50  Mg     2.1     02-28    TPro  9.2<H>  /  Alb  4.2  /  TBili  0.4  /  DBili  x   /  AST  30  /  ALT  28  /  AlkPhos  59  02-28      LIVER FUNCTIONS - ( 28 Feb 2022 18:50 )  Alb: 4.2 g/dL / Pro: 9.2 gm/dL / ALK PHOS: 59 U/L / ALT: 28 U/L / AST: 30 U/L / GGT: x               Home Medications:  Multiple Vitamins oral tablet: 1 tab(s) orally once a day (04 Feb 2022 13:48)  thiamine 100 mg oral tablet: 1 tab(s) orally once a day (04 Feb 2022 13:48)

## 2022-02-28 NOTE — ED PROVIDER NOTE - CARE PLAN
1 Principal Discharge DX:	Alcohol withdrawal   Principal Discharge DX:	Alcohol withdrawal  Secondary Diagnosis:	ROSA (acute kidney injury)

## 2022-02-28 NOTE — H&P ADULT - NSHPREVIEWOFSYSTEMS_GEN_ALL_CORE
Constitutional: no fever, chills, night sweats  Ears: no hearing changes or ear pain,   Nose: no nasal congestion, sinus pain, or rhinorrhea  Cardio: no chest pain, orthopnea, edema, or palpitations  Resp: no dyspnea, cough, wheezing  GI: nausea, vomiting, positive.  No diarrhea, constipation, hematochezia, or melena  : no dysuria, urinary frequency, hematuria  MSK: no back pain, neck pain  Skin: no rash, pruritis   Neuro: no weakness, dizziness, lightheadedness, syncope   Heme/Lymph: no bruising or bleeding

## 2022-02-28 NOTE — ED ADULT NURSE NOTE - ED STAT RN HANDOFF DETAILS
Report received from PRETTY jamil., Safety checks completed this shift. Safety rounds completed hourly.  IV sites checked Q2+remains WDL.  Fall & skin precautions in place. Will continue to monitor.

## 2022-03-01 LAB
ALBUMIN SERPL ELPH-MCNC: 3.3 G/DL — SIGNIFICANT CHANGE UP (ref 3.3–5)
ALP SERPL-CCNC: 55 U/L — SIGNIFICANT CHANGE UP (ref 40–120)
ALT FLD-CCNC: 21 U/L — SIGNIFICANT CHANGE UP (ref 12–78)
ANION GAP SERPL CALC-SCNC: 5 MMOL/L — SIGNIFICANT CHANGE UP (ref 5–17)
AST SERPL-CCNC: 21 U/L — SIGNIFICANT CHANGE UP (ref 15–37)
BILIRUB SERPL-MCNC: 0.7 MG/DL — SIGNIFICANT CHANGE UP (ref 0.2–1.2)
BUN SERPL-MCNC: 17 MG/DL — SIGNIFICANT CHANGE UP (ref 7–23)
CALCIUM SERPL-MCNC: 8.7 MG/DL — SIGNIFICANT CHANGE UP (ref 8.5–10.1)
CHLORIDE SERPL-SCNC: 107 MMOL/L — SIGNIFICANT CHANGE UP (ref 96–108)
CO2 SERPL-SCNC: 28 MMOL/L — SIGNIFICANT CHANGE UP (ref 22–31)
CREAT SERPL-MCNC: 1.18 MG/DL — SIGNIFICANT CHANGE UP (ref 0.5–1.3)
EGFR: 73 ML/MIN/1.73M2 — SIGNIFICANT CHANGE UP
GLUCOSE SERPL-MCNC: 118 MG/DL — HIGH (ref 70–99)
HCT VFR BLD CALC: 34.8 % — LOW (ref 39–50)
HGB BLD-MCNC: 10.9 G/DL — LOW (ref 13–17)
MCHC RBC-ENTMCNC: 23.1 PG — LOW (ref 27–34)
MCHC RBC-ENTMCNC: 31.3 G/DL — LOW (ref 32–36)
MCV RBC AUTO: 73.9 FL — LOW (ref 80–100)
NRBC # BLD: 0 /100 WBCS — SIGNIFICANT CHANGE UP (ref 0–0)
PLATELET # BLD AUTO: 285 K/UL — SIGNIFICANT CHANGE UP (ref 150–400)
POTASSIUM SERPL-MCNC: 3.4 MMOL/L — LOW (ref 3.5–5.3)
POTASSIUM SERPL-SCNC: 3.4 MMOL/L — LOW (ref 3.5–5.3)
PROT SERPL-MCNC: 7.4 GM/DL — SIGNIFICANT CHANGE UP (ref 6–8.3)
RBC # BLD: 4.71 M/UL — SIGNIFICANT CHANGE UP (ref 4.2–5.8)
RBC # FLD: 19.9 % — HIGH (ref 10.3–14.5)
SODIUM SERPL-SCNC: 140 MMOL/L — SIGNIFICANT CHANGE UP (ref 135–145)
WBC # BLD: 4.68 K/UL — SIGNIFICANT CHANGE UP (ref 3.8–10.5)
WBC # FLD AUTO: 4.68 K/UL — SIGNIFICANT CHANGE UP (ref 3.8–10.5)

## 2022-03-01 PROCEDURE — 99232 SBSQ HOSP IP/OBS MODERATE 35: CPT

## 2022-03-01 RX ORDER — POTASSIUM CHLORIDE 20 MEQ
20 PACKET (EA) ORAL ONCE
Refills: 0 | Status: COMPLETED | OUTPATIENT
Start: 2022-03-01 | End: 2022-03-01

## 2022-03-01 RX ADMIN — MORPHINE SULFATE 2 MILLIGRAM(S): 50 CAPSULE, EXTENDED RELEASE ORAL at 19:13

## 2022-03-01 RX ADMIN — Medication 1 TABLET(S): at 11:31

## 2022-03-01 RX ADMIN — Medication 2 MILLIGRAM(S): at 00:56

## 2022-03-01 RX ADMIN — Medication 2 MILLIGRAM(S): at 17:16

## 2022-03-01 RX ADMIN — Medication 2 MILLIGRAM(S): at 23:41

## 2022-03-01 RX ADMIN — Medication 1 MILLIGRAM(S): at 11:31

## 2022-03-01 RX ADMIN — HEPARIN SODIUM 5000 UNIT(S): 5000 INJECTION INTRAVENOUS; SUBCUTANEOUS at 06:28

## 2022-03-01 RX ADMIN — Medication 100 MILLIGRAM(S): at 11:31

## 2022-03-01 RX ADMIN — SODIUM CHLORIDE 1000 MILLILITER(S): 9 INJECTION, SOLUTION INTRAVENOUS at 00:39

## 2022-03-01 RX ADMIN — MORPHINE SULFATE 2 MILLIGRAM(S): 50 CAPSULE, EXTENDED RELEASE ORAL at 17:16

## 2022-03-01 RX ADMIN — Medication 2 MILLIGRAM(S): at 06:28

## 2022-03-01 RX ADMIN — Medication 1 GRAM(S): at 11:31

## 2022-03-01 RX ADMIN — Medication 1 GRAM(S): at 23:39

## 2022-03-01 RX ADMIN — Medication 20 MILLIEQUIVALENT(S): at 17:16

## 2022-03-01 RX ADMIN — Medication 2 MILLIGRAM(S): at 11:31

## 2022-03-01 RX ADMIN — Medication 1 GRAM(S): at 17:16

## 2022-03-01 RX ADMIN — HEPARIN SODIUM 5000 UNIT(S): 5000 INJECTION INTRAVENOUS; SUBCUTANEOUS at 17:16

## 2022-03-01 RX ADMIN — Medication 1 GRAM(S): at 06:28

## 2022-03-01 RX ADMIN — Medication 1 GRAM(S): at 00:39

## 2022-03-01 NOTE — PROGRESS NOTE ADULT - SUBJECTIVE AND OBJECTIVE BOX
Patient is a 56y old  Male who presents with a chief complaint of alc gastritis, withdrawal (28 Feb 2022 23:48)      INTERVAL HPI/OVERNIGHT EVENTS: Pt seen and examined at bedside. admits continued abd discomfort.     MEDICATIONS  (STANDING):  folic acid 1 milliGRAM(s) Oral daily  heparin   Injectable 5000 Unit(s) SubCutaneous every 12 hours  LORazepam     Tablet 2 milliGRAM(s) Oral every 6 hours  multivitamin 1 Tablet(s) Oral daily  sucralfate 1 Gram(s) Oral four times a day  thiamine 100 milliGRAM(s) Oral daily    MEDICATIONS  (PRN):  morphine  - Injectable 2 milliGRAM(s) IV Push every 6 hours PRN Moderate Pain (4 - 6)  ondansetron Injectable 4 milliGRAM(s) IV Push every 6 hours PRN Nausea and/or Vomiting  PHENobarbital Injectable 130 milliGRAM(s) IV Push every 1 hour PRN acute withdrawal      Allergies    No Known Allergies    Intolerances        REVIEW OF SYSTEMS:  CONSTITUTIONAL: No fever or chills  HEENT:  No headache, no sore throat  RESPIRATORY: No cough, wheezing, or shortness of breath  CARDIOVASCULAR: No chest pain, palpitations, or leg swelling  GASTROINTESTINAL: (+) mild diffuse abd pain, nausea, vomiting, or diarrhea  GENITOURINARY: No dysuria, frequency, or hematuria  NEUROLOGICAL: no focal weakness or dizziness  MUSCULOSKELETAL: no myalgias     Vital Signs Last 24 Hrs  T(C): 36.8 (01 Mar 2022 17:05), Max: 37.7 (01 Mar 2022 11:35)  T(F): 98.3 (01 Mar 2022 17:05), Max: 99.8 (01 Mar 2022 11:35)  HR: 97 (01 Mar 2022 17:05) (90 - 128)  BP: 130/85 (01 Mar 2022 17:05) (118/80 - 156/96)  BP(mean): 89 (28 Feb 2022 19:23) (89 - 89)  RR: 18 (01 Mar 2022 17:05) (17 - 20)  SpO2: 94% (01 Mar 2022 17:05) (94% - 99%)    PHYSICAL EXAM:  GENERAL: M in NAD  HEENT:  NC/AT, EOMI, moist mucous membranes  CHEST/LUNG:  CTA b/l, no rales, wheezes, or rhonchi  HEART:  RRR, S1, S2  ABDOMEN:  BS+, soft, nontender, nondistended  EXTREMITIES: no edema, cyanosis, or calf tenderness  NERVOUS SYSTEM: AA&Ox3    LABS:                        10.9   4.68  )-----------( 285      ( 01 Mar 2022 12:41 )             34.8     CBC Full  -  ( 01 Mar 2022 12:41 )  WBC Count : 4.68 K/uL  Hemoglobin : 10.9 g/dL  Hematocrit : 34.8 %  Platelet Count - Automated : 285 K/uL  Mean Cell Volume : 73.9 fl  Mean Cell Hemoglobin : 23.1 pg  Mean Cell Hemoglobin Concentration : 31.3 g/dL  Auto Neutrophil # : x  Auto Lymphocyte # : x  Auto Monocyte # : x  Auto Eosinophil # : x  Auto Basophil # : x  Auto Neutrophil % : x  Auto Lymphocyte % : x  Auto Monocyte % : x  Auto Eosinophil % : x  Auto Basophil % : x    01 Mar 2022 12:41    140    |  107    |  17     ----------------------------<  118    3.4     |  28     |  1.18     Ca    8.7        01 Mar 2022 12:41  Mg     2.1       28 Feb 2022 18:50    TPro  7.4    /  Alb  3.3    /  TBili  0.7    /  DBili  x      /  AST  21     /  ALT  21     /  AlkPhos  55     01 Mar 2022 12:41        CAPILLARY BLOOD GLUCOSE              RADIOLOGY & ADDITIONAL TESTS:    Personally reviewed.     Consultant(s) Notes Reviewed:  [x] YES  [ ] NO    Care Discussed with [x] Consultants  [x] Patient  [ ] Family  [ ]      [ ] Other; RN  DVT ppx

## 2022-03-01 NOTE — PATIENT PROFILE ADULT - FUNCTIONAL ASSESSMENT - DAILY ACTIVITY 1.
[0] : no pain reported [Clean/Dry/Intact] : clean, dry and intact [Healed] : healed [Swelling] : swollen [Neuro Intact] : an unremarkable neurological exam [Vascular Intact] : ~T peripheral vascular exam normal [Doing Well] : is doing well [Excellent Pain Control] : has excellent pain control [No Sign of Infection] : is showing no signs of infection [Chills] : no chills [Fever] : no fever [Nausea] : no nausea [Erythema] : not erythematous [Vomiting] : no vomiting [Discharge] : absent of discharge [Dehiscence] : not dehisced [de-identified] : 65-year-old female status post left knee ACL reconstruction (BTB allo) 4.19.19 [de-identified] : Left knee exam\par \par Skin: Incision(s) clean, dry, intact, no drainage, healed\par Inspection: Mild swelling, min residual effusion \par Pulses: 2+ DP/PT pulses\par ROM: 5-130\par Tenderness: Mild throughout\par Stability: Stable, IA lachman\par Strength: 4/5 Q/H 5/5 TA/GS/EHL quad atrophy\par Neuro: In tact to light touch throughout [de-identified] : 65-year-old female status post left knee ACL reconstruction (BTB allo) \par \par Continues progressions with range of motion and function left knee. Transitioned into a functional brace at this time. Discussion was had with patient regarding continued physical therapy, and gradual return to light activity within the protocol.\par \par Recommendations:\par \par 1. Continue PT per protocol; Progress to terminal ROM as tolerated. Continue hamstring/quad strengthening, advance closed chain exercises, proprioception exercise. Begin running/impact loading program. Progression of sport specific endurance/strengthening/sport specific drills. Goal of return to sport at 9-12mos following confirmation of strength, confidence and agility with completion of a RTP program.\par 2. Brace: OTC knee sleeve as needed\par 3. Meds: PRN use NSAIDs/Tylenol\par 4. Continue symptommatic Ice/elevate as needed\par 5. Restrictions: None\par \par Followup in 3 mos. \par  [de-identified] : 65-year-old female status post left knee ACL reconstruction (BTB allo) 4.19.19. She is doing well. She transitioned into a functional brace. Denies post op complication. Attending PT 2 x per week. Is having some trouble walking down steps still. No pain reported. 3 = A little assistance

## 2022-03-01 NOTE — PROGRESS NOTE ADULT - ASSESSMENT
Patient is a 56M with a PMH of chronic ETOH abuse with hx of alcohol withdrawal and DTs with frequent hospital admissions, alcoholic gastritis/esophagitis, PUD, HLD, hx of hypoxic respiratory failure requiring intubation due to aspiration PNA who presents to the ED for abdominal pain and vomiting.  Reports moderate, diffuse abdominal pain.  States that he is unable to tolerate PO intake.  Reports his last drink was two days ago.  Patient has no other complaints.  Tachycardic in ED to 128, labs benign.     IMPROVE VTE Individual Risk Assessment          RISK                                                          Points  [  ] Previous VTE                                                3  [  ] Thrombophilia                                             2  [  ] Lower limb paralysis                                    2        (unable to hold up >15 seconds)    [  ] Current Cancer                                             2         (within 6 months)  [  ] Immobilization > 24 hrs                              1  [  ] ICU/CCU stay > 24 hours                            1  [  ] Age > 60                                                    1    IMPROVE VTE Score - 1

## 2022-03-01 NOTE — PROGRESS NOTE ADULT - PROBLEM SELECTOR PLAN 1
CIWA currently 7 -->3, recently received ativan  Will continue withdrawal protocol with Librium 50 q8 standing  phenobarbital PRN for acute withdrawal symptoms  Thiamine, Folate, MVI daily  lipase WNL, doubt pancreatitis -- CT showed hepatic steatosis and R inguinal hernia containing fat, advised pt of this finding, however his pain is more generalized, likely from alcoholic gastritis -- c/w supportive mgmt  no evidence of DTs currently

## 2022-03-01 NOTE — PATIENT PROFILE ADULT - FALL HARM RISK - RISK INTERVENTIONS
Assistance OOB with selected safe patient handling equipment/Assistance with ambulation/Communicate Fall Risk and Risk Factors to all staff, patient, and family/Discuss with provider need for PT consult/Monitor for mental status changes/Monitor gait and stability/Provide patient with walking aids - walker, cane, crutches/Reinforce activity limits and safety measures with patient and family/Toileting schedule using arm’s reach rule for commode and bathroom/Use of alarms - bed, chair and/or voice tab/Visual Cue: Yellow wristband/Bed in lowest position, wheels locked, appropriate side rails in place/Call bell, personal items and telephone in reach/Instruct patient to call for assistance before getting out of bed or chair/Non-slip footwear when patient is out of bed/[place] to call system/Physically safe environment - no spills, clutter or unnecessary equipment/Purposeful Proactive Rounding/Room/bathroom lighting operational, light cord in reach

## 2022-03-01 NOTE — PHARMACOTHERAPY INTERVENTION NOTE - COMMENTS
Pharmacy: [facility]      Phone Number: [phone]    Information Source: PharmacistDesirae    Current Meds:    Simvastatin 20 mg, one tab by mouth once daily   Last Filled: It’s ready for pickup (a new prescription)    Folic acid 1 mg, one tab by mouth once daily   Last Filled: 2/5, picked up 30 days     Thiamine 100 mg, one tab by mouth once daily   Last Filled: 2/5, picked up 30 days     Sucralfate 1 G, one tab by mouth four times daily   Last Filled: 1/24, picked up 30 days     Pantoprazole 40 mg, one tab by mouth once daily   Last Filled: 1/01, picked up 30 days     Multivitamin OTC?

## 2022-03-02 ENCOUNTER — TRANSCRIPTION ENCOUNTER (OUTPATIENT)
Age: 57
End: 2022-03-02

## 2022-03-02 VITALS
TEMPERATURE: 98 F | SYSTOLIC BLOOD PRESSURE: 124 MMHG | OXYGEN SATURATION: 95 % | DIASTOLIC BLOOD PRESSURE: 82 MMHG | HEART RATE: 77 BPM | RESPIRATION RATE: 18 BRPM

## 2022-03-02 DIAGNOSIS — E87.6 HYPOKALEMIA: ICD-10-CM

## 2022-03-02 DIAGNOSIS — D63.8 ANEMIA IN OTHER CHRONIC DISEASES CLASSIFIED ELSEWHERE: ICD-10-CM

## 2022-03-02 LAB
ALBUMIN SERPL ELPH-MCNC: 2.8 G/DL — LOW (ref 3.3–5)
ALP SERPL-CCNC: 47 U/L — SIGNIFICANT CHANGE UP (ref 40–120)
ALT FLD-CCNC: 17 U/L — SIGNIFICANT CHANGE UP (ref 12–78)
ANION GAP SERPL CALC-SCNC: 6 MMOL/L — SIGNIFICANT CHANGE UP (ref 5–17)
ANISOCYTOSIS BLD QL: SIGNIFICANT CHANGE UP
AST SERPL-CCNC: 27 U/L — SIGNIFICANT CHANGE UP (ref 15–37)
BASOPHILS # BLD AUTO: 0 K/UL — SIGNIFICANT CHANGE UP (ref 0–0.2)
BASOPHILS NFR BLD AUTO: 0 % — SIGNIFICANT CHANGE UP (ref 0–2)
BILIRUB SERPL-MCNC: 0.4 MG/DL — SIGNIFICANT CHANGE UP (ref 0.2–1.2)
BUN SERPL-MCNC: 13 MG/DL — SIGNIFICANT CHANGE UP (ref 7–23)
CALCIUM SERPL-MCNC: 8.7 MG/DL — SIGNIFICANT CHANGE UP (ref 8.5–10.1)
CHLORIDE SERPL-SCNC: 106 MMOL/L — SIGNIFICANT CHANGE UP (ref 96–108)
CO2 SERPL-SCNC: 26 MMOL/L — SIGNIFICANT CHANGE UP (ref 22–31)
CREAT SERPL-MCNC: 1.04 MG/DL — SIGNIFICANT CHANGE UP (ref 0.5–1.3)
EGFR: 85 ML/MIN/1.73M2 — SIGNIFICANT CHANGE UP
ELLIPTOCYTES BLD QL SMEAR: SLIGHT — SIGNIFICANT CHANGE UP
EOSINOPHIL # BLD AUTO: 0.19 K/UL — SIGNIFICANT CHANGE UP (ref 0–0.5)
EOSINOPHIL NFR BLD AUTO: 6 % — SIGNIFICANT CHANGE UP (ref 0–6)
GLUCOSE SERPL-MCNC: 92 MG/DL — SIGNIFICANT CHANGE UP (ref 70–99)
HCT VFR BLD CALC: 33.4 % — LOW (ref 39–50)
HGB BLD-MCNC: 10.3 G/DL — LOW (ref 13–17)
LYMPHOCYTES # BLD AUTO: 1.61 K/UL — SIGNIFICANT CHANGE UP (ref 1–3.3)
LYMPHOCYTES # BLD AUTO: 51 % — HIGH (ref 13–44)
MACROCYTES BLD QL: SIGNIFICANT CHANGE UP
MANUAL SMEAR VERIFICATION: SIGNIFICANT CHANGE UP
MCHC RBC-ENTMCNC: 22.9 PG — LOW (ref 27–34)
MCHC RBC-ENTMCNC: 30.8 G/DL — LOW (ref 32–36)
MCV RBC AUTO: 74.4 FL — LOW (ref 80–100)
MONOCYTES # BLD AUTO: 0.25 K/UL — SIGNIFICANT CHANGE UP (ref 0–0.9)
MONOCYTES NFR BLD AUTO: 8 % — SIGNIFICANT CHANGE UP (ref 2–14)
NEUTROPHILS # BLD AUTO: 1.01 K/UL — LOW (ref 1.8–7.4)
NEUTROPHILS NFR BLD AUTO: 32 % — LOW (ref 43–77)
NRBC # BLD: 0 /100 — SIGNIFICANT CHANGE UP (ref 0–0)
NRBC # BLD: SIGNIFICANT CHANGE UP /100 WBCS (ref 0–0)
OVALOCYTES BLD QL SMEAR: SLIGHT — SIGNIFICANT CHANGE UP
PLAT MORPH BLD: NORMAL — SIGNIFICANT CHANGE UP
PLATELET # BLD AUTO: 251 K/UL — SIGNIFICANT CHANGE UP (ref 150–400)
POTASSIUM SERPL-MCNC: 3.8 MMOL/L — SIGNIFICANT CHANGE UP (ref 3.5–5.3)
POTASSIUM SERPL-SCNC: 3.8 MMOL/L — SIGNIFICANT CHANGE UP (ref 3.5–5.3)
PROT SERPL-MCNC: 6.7 GM/DL — SIGNIFICANT CHANGE UP (ref 6–8.3)
RBC # BLD: 4.49 M/UL — SIGNIFICANT CHANGE UP (ref 4.2–5.8)
RBC # FLD: 19.7 % — HIGH (ref 10.3–14.5)
RBC BLD AUTO: ABNORMAL
SODIUM SERPL-SCNC: 138 MMOL/L — SIGNIFICANT CHANGE UP (ref 135–145)
VARIANT LYMPHS # BLD: 3 % — SIGNIFICANT CHANGE UP (ref 0–6)
WBC # BLD: 3.16 K/UL — LOW (ref 3.8–10.5)
WBC # FLD AUTO: 3.16 K/UL — LOW (ref 3.8–10.5)

## 2022-03-02 PROCEDURE — 99238 HOSP IP/OBS DSCHRG MGMT 30/<: CPT

## 2022-03-02 RX ORDER — MORPHINE SULFATE 50 MG/1
2 CAPSULE, EXTENDED RELEASE ORAL ONCE
Refills: 0 | Status: DISCONTINUED | OUTPATIENT
Start: 2022-03-02 | End: 2022-03-02

## 2022-03-02 RX ORDER — ONDANSETRON 8 MG/1
1 TABLET, FILM COATED ORAL
Qty: 15 | Refills: 0
Start: 2022-03-02 | End: 2022-03-06

## 2022-03-02 RX ORDER — ACETAMINOPHEN 500 MG
650 TABLET ORAL EVERY 6 HOURS
Refills: 0 | Status: DISCONTINUED | OUTPATIENT
Start: 2022-03-02 | End: 2022-03-02

## 2022-03-02 RX ADMIN — MORPHINE SULFATE 2 MILLIGRAM(S): 50 CAPSULE, EXTENDED RELEASE ORAL at 07:54

## 2022-03-02 RX ADMIN — Medication 1 TABLET(S): at 11:16

## 2022-03-02 RX ADMIN — Medication 1 GRAM(S): at 05:21

## 2022-03-02 RX ADMIN — Medication 1 GRAM(S): at 11:16

## 2022-03-02 RX ADMIN — Medication 100 MILLIGRAM(S): at 11:16

## 2022-03-02 RX ADMIN — Medication 2 MILLIGRAM(S): at 05:21

## 2022-03-02 RX ADMIN — Medication 650 MILLIGRAM(S): at 08:49

## 2022-03-02 RX ADMIN — Medication 2 MILLIGRAM(S): at 11:16

## 2022-03-02 RX ADMIN — Medication 1 MILLIGRAM(S): at 11:16

## 2022-03-02 RX ADMIN — MORPHINE SULFATE 2 MILLIGRAM(S): 50 CAPSULE, EXTENDED RELEASE ORAL at 06:16

## 2022-03-02 RX ADMIN — HEPARIN SODIUM 5000 UNIT(S): 5000 INJECTION INTRAVENOUS; SUBCUTANEOUS at 05:21

## 2022-03-02 RX ADMIN — Medication 650 MILLIGRAM(S): at 09:21

## 2022-03-02 NOTE — DISCHARGE NOTE NURSING/CASE MANAGEMENT/SOCIAL WORK - PATIENT PORTAL LINK FT
You can access the FollowMyHealth Patient Portal offered by [facility] by registering at the following website: [email or web address]. By joining [employer]’s FollowMyHealth portal, you will also be able to view your health information using other applications (apps) compatible with our system.

## 2022-03-02 NOTE — DISCHARGE NOTE PROVIDER - NSDCCPCAREPLAN_GEN_ALL_CORE_FT
PRINCIPAL DISCHARGE DIAGNOSIS  Diagnosis: Alcohol withdrawal  Assessment and Plan of Treatment:       SECONDARY DISCHARGE DIAGNOSES  Diagnosis: ROSA (acute kidney injury)  Assessment and Plan of Treatment:

## 2022-03-02 NOTE — DISCHARGE NOTE PROVIDER - HOSPITAL COURSE
Patient is a 56y old  Male who presents with a chief complaint of alc gastritis, withdrawal (01 Mar 2022 18:31)    HPI:  Patient is a 56M with a PMH of chronic ETOH abuse with hx of alcohol withdrawal and DTs with frequent hospital admissions, alcoholic gastritis/esophagitis, PUD, HLD, hx of hypoxic respiratory failure requiring intubation due to aspiration PNA who presents to the ED for abdominal pain and vomiting.  Reports moderate, diffuse abdominal pain.  States that he is unable to tolerate PO intake.  Reports his last drink was two days ago.  Patient has no other complaints.  Tachycardic in ED to 128, labs benign.  Will admit to tele.    (2022 23:48)  Chart reviewed, overnight events noted. Case discussed with nurse at bedside.  Patient denies, nausea, vomiting or abdominal pain, tolerating po well.    SUBJECTIVE & OBJECTIVE: Pt seen and examined at bedside.   PHYSICAL EXAM:  Vital Signs Last 24 Hrs  T(C): 36.4 (02 Mar 2022 11:22), Max: 36.9 (02 Mar 2022 04:50)  T(F): 97.6 (02 Mar 2022 11:22), Max: 98.5 (02 Mar 2022 04:50)  HR: 68 (02 Mar 2022 11:22) (66 - 97)  BP: 128/81 (02 Mar 2022 11:22) (118/77 - 135/86)  BP(mean): --  RR: 18 (02 Mar 2022 11:22) (18 - 18)  SpO2: 96% (02 Mar 2022 11:22) (94% - 98%)   Daily     Daily Weight in k.5 (02 Mar 2022 04:50)I&O's Detail    01 Mar 2022 07:01  -  02 Mar 2022 07:00  --------------------------------------------------------  IN:  Total IN: 0 mL    OUT:    Voided (mL): 770 mL  Total OUT: 770 mL    Total NET: -770 mL    Patient is calm, comfortable and pleasant.    GENERAL: NAD, well-groomed, well-developed  HEAD:  Atraumatic, Normocephalic  EYES: EOMI, PERRLA, conjunctiva and sclera clear  ENMT: Moist mucous membranes  NECK: Supple, No JVD  NERVOUS SYSTEM:  Alert & Oriented X3, Motor Strength 5/5 B/L upper and lower extremities; DTRs 2+ intact and symmetric  CHEST/LUNG: Clear to auscultation bilaterally; No rales, rhonchi, wheezing, or rubs  HEART: Regular rate and rhythm; No murmurs, rubs, or gallops  ABDOMEN: Soft, Nontender, Nondistended; Bowel sounds present  EXTREMITIES:  2+ Peripheral Pulses, No clubbing, cyanosis, or edema  LABS:                        10.3   3.16  )-----------( 251      ( 02 Mar 2022 06:24 )             33.4     03-    138  |  106  |  13  ----------------------------<  92  3.8   |  26  |  1.04    Ca    8.7      02 Mar 2022 06:24  Mg     2.1     -    TPro  6.7  /  Alb  2.8<L>  /  TBili  0.4  /  DBili  x   /  AST  27  /  ALT  17  /  AlkPhos  47  03-02  MEDICATIONS  (STANDING):  folic acid 1 milliGRAM(s) Oral daily  heparin   Injectable 5000 Unit(s) SubCutaneous every 12 hours  LORazepam     Tablet 2 milliGRAM(s) Oral every 6 hours  multivitamin 1 Tablet(s) Oral daily  sucralfate 1 Gram(s) Oral four times a day  thiamine 100 milliGRAM(s) Oral daily    MEDICATIONS  (PRN):  acetaminophen     Tablet .. 650 milliGRAM(s) Oral every 6 hours PRN Temp greater or equal to 38C (100.4F), Mild Pain (1 - 3)  morphine  - Injectable 2 milliGRAM(s) IV Push every 6 hours PRN Moderate Pain (4 - 6)  ondansetron Injectable 4 milliGRAM(s) IV Push every 6 hours PRN Nausea and/or Vomiting  PHENobarbital Injectable 130 milliGRAM(s) IV Push every 1 hour PRN acute withdrawal

## 2022-03-02 NOTE — DISCHARGE NOTE NURSING/CASE MANAGEMENT/SOCIAL WORK - NSDCPEFALRISK_GEN_ALL_CORE
For information on Fall & Injury Prevention, visit: [email or web address] OR  [email or web address] OR  https://www.cdc.gov/steadi/patient.html

## 2022-03-02 NOTE — DISCHARGE NOTE PROVIDER - NPI NUMBER (FOR SYSADMIN USE ONLY) :
Pt to ED a&ox3 with hx of vascular dementia. here today with her daughter. The patient gets dialysis T Th Sat, has not missed any sessions. She is here today because she went for dialysis and they sent her here to the ED. The patient reports that at dialysis it is very uncomfortable for her to sit in the chair for long periods due to her bed sores. Dialysis did not start or complete her session today, they came straight here. No CP, sob, or swelling. Fistula in R arm. No other missed sessions other than today.   [UNKNOWN]

## 2022-03-02 NOTE — DISCHARGE NOTE PROVIDER - NSDCMRMEDTOKEN_GEN_ALL_CORE_FT
folic acid 1 mg oral tablet: 1 tab(s) orally once a day  pantoprazole 40 mg oral delayed release tablet: 1 tab(s) orally once a day  simvastatin 20 mg oral tablet: 1 tab(s) orally once a day (at bedtime)  sucralfate 1 g oral tablet: 1 tab(s) orally 4 times a day  thiamine 100 mg oral tablet: 1 tab(s) orally once a day  Zofran 4 mg oral tablet: 1 tab(s) orally every 8 hours, As Needed -for nausea

## 2022-03-05 DIAGNOSIS — D63.8 ANEMIA IN OTHER CHRONIC DISEASES CLASSIFIED ELSEWHERE: ICD-10-CM

## 2022-03-05 DIAGNOSIS — K29.20 ALCOHOLIC GASTRITIS WITHOUT BLEEDING: ICD-10-CM

## 2022-03-05 DIAGNOSIS — E78.2 MIXED HYPERLIPIDEMIA: ICD-10-CM

## 2022-03-05 DIAGNOSIS — Z79.899 OTHER LONG TERM (CURRENT) DRUG THERAPY: ICD-10-CM
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
## 2022-03-05 DIAGNOSIS — F10.230 ALCOHOL DEPENDENCE WITH WITHDRAWAL, UNCOMPLICATED: ICD-10-CM

## 2022-03-05 DIAGNOSIS — N17.9 ACUTE KIDNEY FAILURE, UNSPECIFIED: ICD-10-CM

## 2022-03-07 NOTE — DIETITIAN INITIAL EVALUATION ADULT. - POUNDS LOST/GAINED
[facility] (Hospital)  Discharge Reassessment    Primary Care Provider: Dannielle Merino DO    Expected Discharge Date: 3/7/2022     Patient is not medically ready for discharge.  Patient rec is Rehab.  CM to follow up on Rehab referrals for placement once medically ready.    Reassessment (most recent)     Discharge Reassessment - 03/07/22 1033        Discharge Reassessment    Did the patient's condition or plan change since previous assessment? No     Discharge Plan discussed with: Patient     Communicated SHIRA with patient/caregiver Yes     Discharge Plan A Rehab     DME Needed Upon Discharge  none     Discharge Barriers Identified None     Why the patient remains in the hospital Requires continued medical care        Post-Acute Status    Post-Acute Authorization Placement     Post-Acute Placement Status Pending medical clearance/testing     Discharge Delays None known at this time                  11

## 2022-03-15 NOTE — PATIENT PROFILE ADULT - NSPRESCRUSEDDRG_GEN_A_NUR
Lena Malhotra is here today for Office Visit (Rm 3-[facility] mental health)        Medications: medications verified, no change  Refills needed today? Yes, Medication Pended, Pharmacy verified     Tobacco history: verified     Advanced Directives: No not on file, not interested.    BP >140/90? No    Care Teams Updated? Yes    Patient Preference for result Communication via:  Yes, Family Comments updated w/ Method and Details in Snapshot      Health Maintenance Due   Topic Date Due   • Influenza Vaccine (1) 09/01/2021      Patient is up to date, no discussion needed..    Immunization History   Administered Date(s) Administered   • COVID-19 12Y+ Pfizer-[employer]Ntech - Requires Dilution 01/23/2021, 02/13/2021, 12/03/2021   • Influenza, Split 10/01/2017   • Tdap 08/11/2015         PHQ 2:  Date Adult PHQ 2 Score Adult PHQ 2 Interpretation   10/22/2021 0 No further screening needed       PHQ 9:          No

## 2022-03-26 ENCOUNTER — EMERGENCY (EMERGENCY)
Facility: HOSPITAL | Age: 57
LOS: 0 days | Discharge: HOME | End: 2022-03-26
Attending: EMERGENCY MEDICINE
Payer: MEDICAID

## 2022-03-26 VITALS
HEART RATE: 89 BPM | TEMPERATURE: 99 F | DIASTOLIC BLOOD PRESSURE: 90 MMHG | RESPIRATION RATE: 17 BRPM | OXYGEN SATURATION: 98 % | SYSTOLIC BLOOD PRESSURE: 136 MMHG

## 2022-03-26 VITALS
HEIGHT: 67 IN | HEART RATE: 100 BPM | DIASTOLIC BLOOD PRESSURE: 97 MMHG | WEIGHT: 176.37 LBS | OXYGEN SATURATION: 100 % | TEMPERATURE: 99 F | SYSTOLIC BLOOD PRESSURE: 154 MMHG | RESPIRATION RATE: 17 BRPM

## 2022-03-26 DIAGNOSIS — R10.9 UNSPECIFIED ABDOMINAL PAIN: ICD-10-CM

## 2022-03-26 DIAGNOSIS — R10.815 PERIUMBILIC ABDOMINAL TENDERNESS: ICD-10-CM

## 2022-03-26 DIAGNOSIS — F10.10 ALCOHOL ABUSE, UNCOMPLICATED: ICD-10-CM

## 2022-03-26 DIAGNOSIS — Z72.89 OTHER PROBLEMS RELATED TO LIFESTYLE: ICD-10-CM

## 2022-03-26 DIAGNOSIS — R11.2 NAUSEA WITH VOMITING, UNSPECIFIED: ICD-10-CM

## 2022-03-26 LAB
ALBUMIN SERPL ELPH-MCNC: 3.9 G/DL — SIGNIFICANT CHANGE UP (ref 3.3–5)
ALP SERPL-CCNC: 63 U/L — SIGNIFICANT CHANGE UP (ref 40–120)
ALT FLD-CCNC: 31 U/L — SIGNIFICANT CHANGE UP (ref 12–78)
ANION GAP SERPL CALC-SCNC: 13 MMOL/L — SIGNIFICANT CHANGE UP (ref 5–17)
ANISOCYTOSIS BLD QL: SLIGHT — SIGNIFICANT CHANGE UP
AST SERPL-CCNC: 30 U/L — SIGNIFICANT CHANGE UP (ref 15–37)
BASOPHILS # BLD AUTO: 0.05 K/UL — SIGNIFICANT CHANGE UP (ref 0–0.2)
BASOPHILS NFR BLD AUTO: 1.1 % — SIGNIFICANT CHANGE UP (ref 0–2)
BILIRUB SERPL-MCNC: 0.5 MG/DL — SIGNIFICANT CHANGE UP (ref 0.2–1.2)
BUN SERPL-MCNC: 18 MG/DL — SIGNIFICANT CHANGE UP (ref 7–23)
CALCIUM SERPL-MCNC: 10.1 MG/DL — SIGNIFICANT CHANGE UP (ref 8.5–10.1)
CHLORIDE SERPL-SCNC: 106 MMOL/L — SIGNIFICANT CHANGE UP (ref 96–108)
CO2 SERPL-SCNC: 20 MMOL/L — LOW (ref 22–31)
CREAT SERPL-MCNC: 1.2 MG/DL — SIGNIFICANT CHANGE UP (ref 0.5–1.3)
EGFR: 72 ML/MIN/1.73M2 — SIGNIFICANT CHANGE UP
ELLIPTOCYTES BLD QL SMEAR: SLIGHT — SIGNIFICANT CHANGE UP
EOSINOPHIL # BLD AUTO: 0.06 K/UL — SIGNIFICANT CHANGE UP (ref 0–0.5)
EOSINOPHIL NFR BLD AUTO: 1.3 % — SIGNIFICANT CHANGE UP (ref 0–6)
ETHANOL SERPL-MCNC: 79 MG/DL — HIGH (ref 0–10)
GLUCOSE SERPL-MCNC: 132 MG/DL — HIGH (ref 70–99)
HCT VFR BLD CALC: 42.1 % — SIGNIFICANT CHANGE UP (ref 39–50)
HGB BLD-MCNC: 13.3 G/DL — SIGNIFICANT CHANGE UP (ref 13–17)
HYPERCHROMIA BLD QL AUTO: SLIGHT — SIGNIFICANT CHANGE UP
HYPOCHROMIA BLD QL: SLIGHT — SIGNIFICANT CHANGE UP
IMM GRANULOCYTES NFR BLD AUTO: 0.2 % — SIGNIFICANT CHANGE UP (ref 0–1.5)
LIDOCAIN IGE QN: 132 U/L — SIGNIFICANT CHANGE UP (ref 73–393)
LYMPHOCYTES # BLD AUTO: 1.51 K/UL — SIGNIFICANT CHANGE UP (ref 1–3.3)
LYMPHOCYTES # BLD AUTO: 33.2 % — SIGNIFICANT CHANGE UP (ref 13–44)
MACROCYTES BLD QL: SLIGHT — SIGNIFICANT CHANGE UP
MACROCYTES OVAL BLD QL SMEAR: SLIGHT — SIGNIFICANT CHANGE UP
MAGNESIUM SERPL-MCNC: 2.2 MG/DL — SIGNIFICANT CHANGE UP (ref 1.6–2.6)
MCHC RBC-ENTMCNC: 22.9 PG — LOW (ref 27–34)
MCHC RBC-ENTMCNC: 31.6 G/DL — LOW (ref 32–36)
MCV RBC AUTO: 72.6 FL — LOW (ref 80–100)
MICROCYTES BLD QL: SLIGHT — SIGNIFICANT CHANGE UP
MONOCYTES # BLD AUTO: 0.3 K/UL — SIGNIFICANT CHANGE UP (ref 0–0.9)
MONOCYTES NFR BLD AUTO: 6.6 % — SIGNIFICANT CHANGE UP (ref 2–14)
NEUTROPHILS # BLD AUTO: 2.62 K/UL — SIGNIFICANT CHANGE UP (ref 1.8–7.4)
NEUTROPHILS NFR BLD AUTO: 57.6 % — SIGNIFICANT CHANGE UP (ref 43–77)
NRBC # BLD: 0 /100 WBCS — SIGNIFICANT CHANGE UP (ref 0–0)
OVALOCYTES BLD QL SMEAR: SLIGHT — SIGNIFICANT CHANGE UP
PLAT MORPH BLD: NORMAL — SIGNIFICANT CHANGE UP
PLATELET # BLD AUTO: 332 K/UL — SIGNIFICANT CHANGE UP (ref 150–400)
POIKILOCYTOSIS BLD QL AUTO: SLIGHT — SIGNIFICANT CHANGE UP
POLYCHROMASIA BLD QL SMEAR: SLIGHT — SIGNIFICANT CHANGE UP
POTASSIUM SERPL-MCNC: 3.6 MMOL/L — SIGNIFICANT CHANGE UP (ref 3.5–5.3)
POTASSIUM SERPL-SCNC: 3.6 MMOL/L — SIGNIFICANT CHANGE UP (ref 3.5–5.3)
PROT SERPL-MCNC: 9.2 GM/DL — HIGH (ref 6–8.3)
RBC # BLD: 5.8 M/UL — SIGNIFICANT CHANGE UP (ref 4.2–5.8)
RBC # FLD: 20.7 % — HIGH (ref 10.3–14.5)
RBC BLD AUTO: ABNORMAL
SODIUM SERPL-SCNC: 139 MMOL/L — SIGNIFICANT CHANGE UP (ref 135–145)
STOMATOCYTES BLD QL SMEAR: PRESENT — SIGNIFICANT CHANGE UP
TARGETS BLD QL SMEAR: SLIGHT — SIGNIFICANT CHANGE UP
WBC # BLD: 4.55 K/UL — SIGNIFICANT CHANGE UP (ref 3.8–10.5)
WBC # FLD AUTO: 4.55 K/UL — SIGNIFICANT CHANGE UP (ref 3.8–10.5)

## 2022-03-26 PROCEDURE — 74177 CT ABD & PELVIS W/CONTRAST: CPT | Mod: 26,MA

## 2022-03-26 PROCEDURE — 99285 EMERGENCY DEPT VISIT HI MDM: CPT

## 2022-03-26 RX ORDER — OXYCODONE AND ACETAMINOPHEN 5; 325 MG/1; MG/1
1 TABLET ORAL ONCE
Refills: 0 | Status: DISCONTINUED | OUTPATIENT
Start: 2022-03-26 | End: 2022-03-26

## 2022-03-26 RX ORDER — THIAMINE MONONITRATE (VIT B1) 100 MG
500 TABLET ORAL ONCE
Refills: 0 | Status: COMPLETED | OUTPATIENT
Start: 2022-03-26 | End: 2022-03-26

## 2022-03-26 RX ORDER — ONDANSETRON 8 MG/1
8 TABLET, FILM COATED ORAL ONCE
Refills: 0 | Status: COMPLETED | OUTPATIENT
Start: 2022-03-26 | End: 2022-03-26

## 2022-03-26 RX ORDER — PHENOBARBITAL 60 MG
260 TABLET ORAL ONCE
Refills: 0 | Status: DISCONTINUED | OUTPATIENT
Start: 2022-03-26 | End: 2022-03-26

## 2022-03-26 RX ORDER — SODIUM CHLORIDE 9 MG/ML
1000 INJECTION, SOLUTION INTRAVENOUS ONCE
Refills: 0 | Status: COMPLETED | OUTPATIENT
Start: 2022-03-26 | End: 2022-03-26

## 2022-03-26 RX ORDER — MORPHINE SULFATE 50 MG/1
4 CAPSULE, EXTENDED RELEASE ORAL ONCE
Refills: 0 | Status: DISCONTINUED | OUTPATIENT
Start: 2022-03-26 | End: 2022-03-26

## 2022-03-26 RX ADMIN — Medication 260 MILLIGRAM(S): at 10:09

## 2022-03-26 RX ADMIN — Medication 105 MILLIGRAM(S): at 11:00

## 2022-03-26 RX ADMIN — MORPHINE SULFATE 4 MILLIGRAM(S): 50 CAPSULE, EXTENDED RELEASE ORAL at 10:20

## 2022-03-26 RX ADMIN — OXYCODONE AND ACETAMINOPHEN 1 TABLET(S): 5; 325 TABLET ORAL at 13:28

## 2022-03-26 RX ADMIN — ONDANSETRON 8 MILLIGRAM(S): 8 TABLET, FILM COATED ORAL at 10:03

## 2022-03-26 RX ADMIN — Medication 30 MILLILITER(S): at 13:28

## 2022-03-26 RX ADMIN — MORPHINE SULFATE 4 MILLIGRAM(S): 50 CAPSULE, EXTENDED RELEASE ORAL at 11:15

## 2022-03-26 RX ADMIN — MORPHINE SULFATE 4 MILLIGRAM(S): 50 CAPSULE, EXTENDED RELEASE ORAL at 10:03

## 2022-03-26 RX ADMIN — Medication 100 MILLIGRAM(S): at 11:06

## 2022-03-26 RX ADMIN — SODIUM CHLORIDE 1000 MILLILITER(S): 9 INJECTION, SOLUTION INTRAVENOUS at 10:09

## 2022-03-26 RX ADMIN — MORPHINE SULFATE 4 MILLIGRAM(S): 50 CAPSULE, EXTENDED RELEASE ORAL at 11:01

## 2022-03-26 NOTE — ED ADULT NURSE NOTE - NSIMPLEMENTINTERV_GEN_ALL_ED
Implemented All Fall Risk Interventions:  [place] to call system. Call bell, personal items and telephone within reach. Instruct patient to call for assistance. Room bathroom lighting operational. Non-slip footwear when patient is off stretcher. Physically safe environment: no spills, clutter or unnecessary equipment. Stretcher in lowest position, wheels locked, appropriate side rails in place. Provide visual cue, wrist band, yellow gown, etc. Monitor gait and stability. Monitor for mental status changes and reorient to person, place, and time. Review medications for side effects contributing to fall risk. Reinforce activity limits and safety measures with patient and family.

## 2022-03-26 NOTE — ED PROVIDER NOTE - PHYSICAL EXAMINATION
Gen: Alert, NAD  Head: NC, AT   Eyes: PERRL, EOMI, normal lids/conjunctiva  ENT: normal hearing, patent oropharynx without erythema/exudate, uvula midline  Neck: supple, no tenderness, Trachea midline  Pulm: Bilateral BS, normal resp effort, no wheeze/stridor/retractions  CV: RRR, no M/R/G, 2+ radial and dp pulses bl, no edema  Abd: diffuse paraumbilical tenderness   Mskel: extremities x4 with normal ROM and no joint effusions. no ctl spine ttp.   Skin: no rash, no bruising, bekah appearing   Neuro: AAOx3, no sensory/motor deficits, CN 2-12 intact

## 2022-03-26 NOTE — ED ADULT NURSE NOTE - OBJECTIVE STATEMENT
Pt 57 y/o male c/c of nausea and abdominal pain. no abd distention noted. pt denies visual and auditory disturbances. pt denies SI/HI. pt states he drank 2 bottles of vodka yesterday. pt AAOX4, remains on cardiac monitor and cont pulse ox.

## 2022-03-26 NOTE — ED PROVIDER NOTE - OBJECTIVE STATEMENT
55 yo M wPMHx of EtOH abuse, gastritis and PUD presents to the ED for abdominal pain with associated symptoms of N/V since last night. Pt admits to drinking alcohol x2 days ago. Denies h/a, lightheadedness, SOB, cough, fever/chills, back pain, dysuria or leg swelling.

## 2022-03-26 NOTE — ED PROVIDER NOTE - PATIENT PORTAL LINK FT
You can access the FollowMyHealth Patient Portal offered by [facility] by registering at the following website: [email or web address]. By joining [employer]’s FollowMyHealth portal, you will also be able to view your health information using other applications (apps) compatible with our system.

## 2022-03-26 NOTE — ED PROVIDER NOTE - CLINICAL SUMMARY MEDICAL DECISION MAKING FREE TEXT BOX
Alcohol gastritis, will give antiemetics, analgesia, prevent withdrawals. Alcohol gastritis, will give antiemetics, analgesia, prevent withdrawals.  tolerating po after treatment. labs and ct reassuring .dc home.

## 2022-04-05 NOTE — PATIENT PROFILE ADULT - NSPROMEDSBROUGHTTOHOSP_GEN_A_NUR
59 yo F with RUQ abd pain, concerning for renal stone, oralia, uti, doubt pregnancy, covid, pna unlikely, doubt pancreatitis   -basic labs, ua, cx, hcg, lactate, lipase, rvp/covid, trop, ekg, CXR, us abd ruq, CT ab/pel, monitor  -f/u results, reeval no

## 2022-04-11 ENCOUNTER — EMERGENCY (EMERGENCY)
Facility: HOSPITAL | Age: 57
LOS: 0 days | Discharge: HOME | End: 2022-04-12
Attending: EMERGENCY MEDICINE
Payer: MEDICAID

## 2022-04-11 VITALS
HEIGHT: 67 IN | OXYGEN SATURATION: 97 % | SYSTOLIC BLOOD PRESSURE: 144 MMHG | HEART RATE: 112 BPM | RESPIRATION RATE: 19 BRPM | DIASTOLIC BLOOD PRESSURE: 99 MMHG | WEIGHT: 179.9 LBS | TEMPERATURE: 98 F

## 2022-04-11 DIAGNOSIS — F10.129 ALCOHOL ABUSE WITH INTOXICATION, UNSPECIFIED: ICD-10-CM

## 2022-04-11 DIAGNOSIS — K29.20 ALCOHOLIC GASTRITIS WITHOUT BLEEDING: ICD-10-CM

## 2022-04-11 DIAGNOSIS — R11.2 NAUSEA WITH VOMITING, UNSPECIFIED: ICD-10-CM

## 2022-04-11 DIAGNOSIS — R10.9 UNSPECIFIED ABDOMINAL PAIN: ICD-10-CM

## 2022-04-11 LAB
ALBUMIN SERPL ELPH-MCNC: 3.8 G/DL — SIGNIFICANT CHANGE UP (ref 3.3–5)
ALP SERPL-CCNC: 59 U/L — SIGNIFICANT CHANGE UP (ref 40–120)
ALT FLD-CCNC: 30 U/L — SIGNIFICANT CHANGE UP (ref 12–78)
ANION GAP SERPL CALC-SCNC: 12 MMOL/L — SIGNIFICANT CHANGE UP (ref 5–17)
AST SERPL-CCNC: 43 U/L — HIGH (ref 15–37)
BILIRUB SERPL-MCNC: 0.4 MG/DL — SIGNIFICANT CHANGE UP (ref 0.2–1.2)
BUN SERPL-MCNC: 13 MG/DL — SIGNIFICANT CHANGE UP (ref 7–23)
CALCIUM SERPL-MCNC: 8.7 MG/DL — SIGNIFICANT CHANGE UP (ref 8.5–10.1)
CHLORIDE SERPL-SCNC: 109 MMOL/L — HIGH (ref 96–108)
CO2 SERPL-SCNC: 20 MMOL/L — LOW (ref 22–31)
CREAT SERPL-MCNC: 1.19 MG/DL — SIGNIFICANT CHANGE UP (ref 0.5–1.3)
EGFR: 73 ML/MIN/1.73M2 — SIGNIFICANT CHANGE UP
GLUCOSE SERPL-MCNC: 87 MG/DL — SIGNIFICANT CHANGE UP (ref 70–99)
HCT VFR BLD CALC: 43.5 % — SIGNIFICANT CHANGE UP (ref 39–50)
HGB BLD-MCNC: 13.3 G/DL — SIGNIFICANT CHANGE UP (ref 13–17)
LACTATE SERPL-SCNC: 5.4 MMOL/L — CRITICAL HIGH (ref 0.7–2)
LIDOCAIN IGE QN: 137 U/L — SIGNIFICANT CHANGE UP (ref 73–393)
MCHC RBC-ENTMCNC: 22.7 PG — LOW (ref 27–34)
MCHC RBC-ENTMCNC: 30.6 G/DL — LOW (ref 32–36)
MCV RBC AUTO: 74.1 FL — LOW (ref 80–100)
PLATELET # BLD AUTO: 315 K/UL — SIGNIFICANT CHANGE UP (ref 150–400)
POTASSIUM SERPL-MCNC: 4.3 MMOL/L — SIGNIFICANT CHANGE UP (ref 3.5–5.3)
POTASSIUM SERPL-SCNC: 4.3 MMOL/L — SIGNIFICANT CHANGE UP (ref 3.5–5.3)
PROT SERPL-MCNC: 8.6 GM/DL — HIGH (ref 6–8.3)
RBC # BLD: 5.87 M/UL — HIGH (ref 4.2–5.8)
RBC # FLD: 21 % — HIGH (ref 10.3–14.5)
SODIUM SERPL-SCNC: 141 MMOL/L — SIGNIFICANT CHANGE UP (ref 135–145)
WBC # BLD: 3.76 K/UL — LOW (ref 3.8–10.5)
WBC # FLD AUTO: 3.76 K/UL — LOW (ref 3.8–10.5)

## 2022-04-11 PROCEDURE — 99285 EMERGENCY DEPT VISIT HI MDM: CPT

## 2022-04-11 RX ORDER — IOHEXOL 300 MG/ML
30 INJECTION, SOLUTION INTRAVENOUS ONCE
Refills: 0 | Status: COMPLETED | OUTPATIENT
Start: 2022-04-11 | End: 2022-04-11

## 2022-04-11 RX ORDER — SODIUM CHLORIDE 9 MG/ML
1000 INJECTION INTRAMUSCULAR; INTRAVENOUS; SUBCUTANEOUS ONCE
Refills: 0 | Status: COMPLETED | OUTPATIENT
Start: 2022-04-11 | End: 2022-04-11

## 2022-04-11 RX ORDER — FAMOTIDINE 10 MG/ML
20 INJECTION INTRAVENOUS ONCE
Refills: 0 | Status: COMPLETED | OUTPATIENT
Start: 2022-04-11 | End: 2022-04-11

## 2022-04-11 RX ORDER — METOCLOPRAMIDE HCL 10 MG
10 TABLET ORAL ONCE
Refills: 0 | Status: COMPLETED | OUTPATIENT
Start: 2022-04-11 | End: 2022-04-11

## 2022-04-11 RX ORDER — ACETAMINOPHEN 500 MG
975 TABLET ORAL ONCE
Refills: 0 | Status: COMPLETED | OUTPATIENT
Start: 2022-04-11 | End: 2022-04-11

## 2022-04-11 RX ADMIN — Medication 10 MILLIGRAM(S): at 21:48

## 2022-04-11 RX ADMIN — SODIUM CHLORIDE 1000 MILLILITER(S): 9 INJECTION INTRAMUSCULAR; INTRAVENOUS; SUBCUTANEOUS at 21:48

## 2022-04-11 RX ADMIN — IOHEXOL 30 MILLILITER(S): 300 INJECTION, SOLUTION INTRAVENOUS at 23:01

## 2022-04-11 RX ADMIN — FAMOTIDINE 20 MILLIGRAM(S): 10 INJECTION INTRAVENOUS at 21:48

## 2022-04-11 NOTE — ED PROVIDER NOTE - CLINICAL SUMMARY MEDICAL DECISION MAKING FREE TEXT BOX
57 yo M with likely alcoholic gastritis  -basic labs, etoh, lactate, lipase, ekg, iv, hydration, pepcid/reglan  -f/u results, reeval

## 2022-04-11 NOTE — ED PROVIDER NOTE - OBJECTIVE STATEMENT
57 yo M presents to the ED for abdominal pain with associated symptoms of N/V since last night. Pt admits to drinking alcohol x2 days ago.  Pt. has no other complaints, states that pain is typical for his gastritis.   ROS: negative for fever, cough, headache, chest pain, shortness of breath, diarrhea, rash, paresthesia, and weakness--all other systems reviewed are negative.   PMH: gastritis and PUD Meds: See EMR for list; SH: Denies smoking/drug use, + EtOH abuse

## 2022-04-11 NOTE — ED ADULT NURSE NOTE - OBJECTIVE STATEMENT
Pt presents to the ED complaining of abdominal pain with n/v since last night. Pt states he was drinking about 2 days ago which aggravates his gastritis. Denies chest pain, diff breathing, sob. Vomiting clear fluid.

## 2022-04-11 NOTE — ED ADULT NURSE NOTE - MODE OF DISCHARGE
Transition Communication Hand-off for Care Transitions to Next Level of Care Provider    Hand-off for Care Transitions to Next Level of Care Provider  Name: Shana Horne  : 1950  MRN #: [id number]  Reason for Hospitalization:  Confusion [R41.0]  Generalized muscle weakness [M62.81]  Chronic bronchitis, unspecified chronic bronchitis type (H) [J42]  Pneumonia of right lung due to infectious organism, unspecified part of lung [J18.9]  Admit Date/Time: 2018  8:28 PM  Discharge Date: 2018    Reason for Communication Hand-off Referral: Admission diagnoses: PN  Admission diagnoses: COPD    Discharge Plan:  Discharged to:  [facility]                   Patient agreeable to post-hospital support suggestions:  Yes    Patient is on new medications:   Yes    MTM follow up recommended: No    Tel-Assurance program:  Ineligible    Follow-up specialty is recommended: Continued care at [facility].     Follow-up plan:  No future appointments.    Any outstanding tests or procedures:  No.       Key Recommendations:  Patient is usually on 4L oxygen at home. She has required significantly higher flow during her hospital stay at [facility]. At discharge, she was on 12L via oxymizer. She also was on tube feeds and has a pain pump. Please follow as able for discharge planning from [facility].     Communicated handoff via EPIC Comm Mgt to Dr. Otoniel Capellan's CC at [phone].     Gail Knox RN, BSN, CTS  [facility]  [phone]      AVS/Discharge Summary is the source of truth; this is a helpful guide for improved communication of patient story          
Ambulatory

## 2022-04-11 NOTE — ED PROVIDER NOTE - PATIENT PORTAL LINK FT
You can access the FollowMyHealth Patient Portal offered by [facility] by registering at the following website: [email or web address]. By joining [employer]’s FollowMyHealth portal, you will also be able to view your health information using other applications (apps) compatible with our system.

## 2022-04-11 NOTE — ED PROVIDER NOTE - PHYSICAL EXAMINATION
Vitals: HTN at 144/99, tachy at 112, otherwise WNL  Gen: AAOx3, NAD, sitting uncomfortably in stretcher, non-toxic  Head: ncat, perrla, eomi b/l  Neck: supple, no lymphadenopathy, no midline deviation  Heart: rrr, no m/r/g  Lungs: CTA b/l, no rales/ronchi/wheezes  Abd: soft, nontender, non-distended, no rebound or guarding  Ext: no clubbing/cyanosis/edema  Neuro: sensation and muscle strength intact b/l, steady gait, walking about ER

## 2022-04-12 VITALS
TEMPERATURE: 98 F | OXYGEN SATURATION: 98 % | SYSTOLIC BLOOD PRESSURE: 124 MMHG | DIASTOLIC BLOOD PRESSURE: 88 MMHG | RESPIRATION RATE: 17 BRPM | HEART RATE: 98 BPM

## 2022-04-12 LAB
ANISOCYTOSIS BLD QL: SLIGHT — SIGNIFICANT CHANGE UP
BASOPHILS # BLD AUTO: 0.08 K/UL — SIGNIFICANT CHANGE UP (ref 0–0.2)
BASOPHILS NFR BLD AUTO: 2 % — SIGNIFICANT CHANGE UP (ref 0–2)
ELLIPTOCYTES BLD QL SMEAR: SLIGHT — SIGNIFICANT CHANGE UP
EOSINOPHIL # BLD AUTO: 0.08 K/UL — SIGNIFICANT CHANGE UP (ref 0–0.5)
EOSINOPHIL NFR BLD AUTO: 2 % — SIGNIFICANT CHANGE UP (ref 0–6)
ETHANOL SERPL-MCNC: 346 MG/DL — HIGH (ref 0–10)
LACTATE SERPL-SCNC: 4.6 MMOL/L — CRITICAL HIGH (ref 0.7–2)
LYMPHOCYTES # BLD AUTO: 2.78 K/UL — SIGNIFICANT CHANGE UP (ref 1–3.3)
LYMPHOCYTES # BLD AUTO: 74 % — HIGH (ref 13–44)
MANUAL SMEAR VERIFICATION: SIGNIFICANT CHANGE UP
MICROCYTES BLD QL: SLIGHT — SIGNIFICANT CHANGE UP
MONOCYTES # BLD AUTO: 0.08 K/UL — SIGNIFICANT CHANGE UP (ref 0–0.9)
MONOCYTES NFR BLD AUTO: 2 % — SIGNIFICANT CHANGE UP (ref 2–14)
NEUTROPHILS # BLD AUTO: 0.75 K/UL — LOW (ref 1.8–7.4)
NEUTROPHILS NFR BLD AUTO: 20 % — LOW (ref 43–77)
NRBC # BLD: 0 /100 — SIGNIFICANT CHANGE UP (ref 0–0)
NRBC # BLD: SIGNIFICANT CHANGE UP /100 WBCS (ref 0–0)
OVALOCYTES BLD QL SMEAR: SLIGHT — SIGNIFICANT CHANGE UP
PLAT MORPH BLD: NORMAL — SIGNIFICANT CHANGE UP
POIKILOCYTOSIS BLD QL AUTO: SLIGHT — SIGNIFICANT CHANGE UP
RBC BLD AUTO: ABNORMAL
TARGETS BLD QL SMEAR: SLIGHT — SIGNIFICANT CHANGE UP

## 2022-04-12 PROCEDURE — 93010 ELECTROCARDIOGRAM REPORT: CPT

## 2022-04-12 PROCEDURE — 76705 ECHO EXAM OF ABDOMEN: CPT | Mod: 26

## 2022-04-12 PROCEDURE — 74177 CT ABD & PELVIS W/CONTRAST: CPT | Mod: 26,MA

## 2022-04-12 RX ORDER — FAMOTIDINE 10 MG/ML
1 INJECTION INTRAVENOUS
Qty: 10 | Refills: 0
Start: 2022-04-12 | End: 2022-04-16

## 2022-04-12 RX ORDER — SODIUM CHLORIDE 9 MG/ML
2000 INJECTION INTRAMUSCULAR; INTRAVENOUS; SUBCUTANEOUS ONCE
Refills: 0 | Status: COMPLETED | OUTPATIENT
Start: 2022-04-12 | End: 2022-04-12

## 2022-04-12 RX ORDER — MORPHINE SULFATE 50 MG/1
2 CAPSULE, EXTENDED RELEASE ORAL ONCE
Refills: 0 | Status: DISCONTINUED | OUTPATIENT
Start: 2022-04-12 | End: 2022-04-12

## 2022-04-12 RX ADMIN — MORPHINE SULFATE 2 MILLIGRAM(S): 50 CAPSULE, EXTENDED RELEASE ORAL at 04:18

## 2022-04-12 RX ADMIN — SODIUM CHLORIDE 2000 MILLILITER(S): 9 INJECTION INTRAMUSCULAR; INTRAVENOUS; SUBCUTANEOUS at 04:19

## 2022-04-12 NOTE — ED ADULT NURSE REASSESSMENT NOTE - NS ED NURSE REASSESS COMMENT FT1
Patient refuses repeat blood work without more pain medications. Dr. Shields made aware- patient has no other stat orders pending. Dr. Shields okay with discharging the patient without repeat lactate as previous lactate was trending down. Urine microalbumin order entered as patient took specimen cup home and will return it.

## 2022-04-12 NOTE — ED ADULT NURSE REASSESSMENT NOTE - NS ED NURSE REASSESS COMMENT FT1
Patient yelling and screaming at nursing staff outside the room. Patient sitting on the floor. Security called to bedside to remove IV. Patient discharged home. As per Dr. Shields, patient medically cleared and can wait in the waiting room for family to pick him up. Family called, no answer.

## 2022-04-12 NOTE — ED ADULT NURSE REASSESSMENT NOTE - NS ED NURSE REASSESS COMMENT FT1
Patient is A&Ox4. Breathing unlabored on RA. Complaining of abdominal pain at this time. denies radiation of the pain. Complaining bloody stools for the past 2-3 days. Dr. Shields notified.

## 2022-04-13 ENCOUNTER — EMERGENCY (EMERGENCY)
Facility: HOSPITAL | Age: 57
LOS: 0 days | Discharge: HOME | End: 2022-04-14
Attending: EMERGENCY MEDICINE
Payer: MEDICAID

## 2022-04-13 VITALS
OXYGEN SATURATION: 96 % | HEART RATE: 100 BPM | TEMPERATURE: 98 F | HEIGHT: 67 IN | DIASTOLIC BLOOD PRESSURE: 88 MMHG | WEIGHT: 190.04 LBS | SYSTOLIC BLOOD PRESSURE: 146 MMHG | RESPIRATION RATE: 19 BRPM

## 2022-04-13 DIAGNOSIS — F10.10 ALCOHOL ABUSE, UNCOMPLICATED: ICD-10-CM

## 2022-04-13 DIAGNOSIS — E78.5 HYPERLIPIDEMIA, UNSPECIFIED: ICD-10-CM

## 2022-04-13 DIAGNOSIS — R10.84 GENERALIZED ABDOMINAL PAIN: ICD-10-CM

## 2022-04-13 DIAGNOSIS — R11.2 NAUSEA WITH VOMITING, UNSPECIFIED: ICD-10-CM

## 2022-04-13 DIAGNOSIS — M79.10 MYALGIA, UNSPECIFIED SITE: ICD-10-CM

## 2022-04-13 DIAGNOSIS — K82.8 OTHER SPECIFIED DISEASES OF GALLBLADDER: ICD-10-CM

## 2022-04-13 DIAGNOSIS — K27.9 PEPTIC ULCER, SITE UNSPECIFIED, UNSPECIFIED AS ACUTE OR CHRONIC, WITHOUT HEMORRHAGE OR PERFORATION: ICD-10-CM

## 2022-04-13 LAB
ALBUMIN SERPL ELPH-MCNC: 3.8 G/DL — SIGNIFICANT CHANGE UP (ref 3.3–5)
ALP SERPL-CCNC: 58 U/L — SIGNIFICANT CHANGE UP (ref 40–120)
ALT FLD-CCNC: 28 U/L — SIGNIFICANT CHANGE UP (ref 12–78)
ANION GAP SERPL CALC-SCNC: 14 MMOL/L — SIGNIFICANT CHANGE UP (ref 5–17)
APTT BLD: 28.2 SEC — SIGNIFICANT CHANGE UP (ref 27.5–35.5)
AST SERPL-CCNC: 41 U/L — HIGH (ref 15–37)
BASOPHILS # BLD AUTO: 0.05 K/UL — SIGNIFICANT CHANGE UP (ref 0–0.2)
BASOPHILS NFR BLD AUTO: 1.1 % — SIGNIFICANT CHANGE UP (ref 0–2)
BILIRUB SERPL-MCNC: 0.5 MG/DL — SIGNIFICANT CHANGE UP (ref 0.2–1.2)
BUN SERPL-MCNC: 11 MG/DL — SIGNIFICANT CHANGE UP (ref 7–23)
CALCIUM SERPL-MCNC: 8.9 MG/DL — SIGNIFICANT CHANGE UP (ref 8.5–10.1)
CHLORIDE SERPL-SCNC: 105 MMOL/L — SIGNIFICANT CHANGE UP (ref 96–108)
CO2 SERPL-SCNC: 24 MMOL/L — SIGNIFICANT CHANGE UP (ref 22–31)
CREAT SERPL-MCNC: 1 MG/DL — SIGNIFICANT CHANGE UP (ref 0.5–1.3)
EGFR: 89 ML/MIN/1.73M2 — SIGNIFICANT CHANGE UP
EOSINOPHIL # BLD AUTO: 0.06 K/UL — SIGNIFICANT CHANGE UP (ref 0–0.5)
EOSINOPHIL NFR BLD AUTO: 1.4 % — SIGNIFICANT CHANGE UP (ref 0–6)
GLUCOSE BLDC GLUCOMTR-MCNC: 84 MG/DL — SIGNIFICANT CHANGE UP (ref 70–99)
GLUCOSE SERPL-MCNC: 81 MG/DL — SIGNIFICANT CHANGE UP (ref 70–99)
HCT VFR BLD CALC: 37.1 % — LOW (ref 39–50)
HGB BLD-MCNC: 11.6 G/DL — LOW (ref 13–17)
IMM GRANULOCYTES NFR BLD AUTO: 0.2 % — SIGNIFICANT CHANGE UP (ref 0–1.5)
INR BLD: 1.02 RATIO — SIGNIFICANT CHANGE UP (ref 0.88–1.16)
LACTATE SERPL-SCNC: 5.4 MMOL/L — CRITICAL HIGH (ref 0.7–2)
LIDOCAIN IGE QN: 170 U/L — SIGNIFICANT CHANGE UP (ref 73–393)
LYMPHOCYTES # BLD AUTO: 2.33 K/UL — SIGNIFICANT CHANGE UP (ref 1–3.3)
LYMPHOCYTES # BLD AUTO: 53.6 % — HIGH (ref 13–44)
MCHC RBC-ENTMCNC: 22.5 PG — LOW (ref 27–34)
MCHC RBC-ENTMCNC: 31.3 G/DL — LOW (ref 32–36)
MCV RBC AUTO: 71.9 FL — LOW (ref 80–100)
MONOCYTES # BLD AUTO: 0.28 K/UL — SIGNIFICANT CHANGE UP (ref 0–0.9)
MONOCYTES NFR BLD AUTO: 6.4 % — SIGNIFICANT CHANGE UP (ref 2–14)
NEUTROPHILS # BLD AUTO: 1.62 K/UL — LOW (ref 1.8–7.4)
NEUTROPHILS NFR BLD AUTO: 37.3 % — LOW (ref 43–77)
NRBC # BLD: 0 /100 WBCS — SIGNIFICANT CHANGE UP (ref 0–0)
PLATELET # BLD AUTO: 282 K/UL — SIGNIFICANT CHANGE UP (ref 150–400)
POTASSIUM SERPL-MCNC: 3.4 MMOL/L — LOW (ref 3.5–5.3)
POTASSIUM SERPL-SCNC: 3.4 MMOL/L — LOW (ref 3.5–5.3)
PROT SERPL-MCNC: 8 GM/DL — SIGNIFICANT CHANGE UP (ref 6–8.3)
PROTHROM AB SERPL-ACNC: 12.3 SEC — SIGNIFICANT CHANGE UP (ref 10.5–13.4)
RBC # BLD: 5.16 M/UL — SIGNIFICANT CHANGE UP (ref 4.2–5.8)
RBC # FLD: 20.5 % — HIGH (ref 10.3–14.5)
SODIUM SERPL-SCNC: 143 MMOL/L — SIGNIFICANT CHANGE UP (ref 135–145)
WBC # BLD: 4.35 K/UL — SIGNIFICANT CHANGE UP (ref 3.8–10.5)
WBC # FLD AUTO: 4.35 K/UL — SIGNIFICANT CHANGE UP (ref 3.8–10.5)

## 2022-04-13 PROCEDURE — 99285 EMERGENCY DEPT VISIT HI MDM: CPT

## 2022-04-13 RX ORDER — METHOCARBAMOL 500 MG/1
750 TABLET, FILM COATED ORAL ONCE
Refills: 0 | Status: COMPLETED | OUTPATIENT
Start: 2022-04-13 | End: 2022-04-13

## 2022-04-13 RX ORDER — SODIUM CHLORIDE 9 MG/ML
1000 INJECTION INTRAMUSCULAR; INTRAVENOUS; SUBCUTANEOUS ONCE
Refills: 0 | Status: COMPLETED | OUTPATIENT
Start: 2022-04-13 | End: 2022-04-13

## 2022-04-13 RX ORDER — ONDANSETRON 8 MG/1
4 TABLET, FILM COATED ORAL ONCE
Refills: 0 | Status: COMPLETED | OUTPATIENT
Start: 2022-04-13 | End: 2022-04-13

## 2022-04-13 RX ORDER — KETOROLAC TROMETHAMINE 30 MG/ML
15 SYRINGE (ML) INJECTION ONCE
Refills: 0 | Status: DISCONTINUED | OUTPATIENT
Start: 2022-04-13 | End: 2022-04-13

## 2022-04-13 RX ORDER — MORPHINE SULFATE 50 MG/1
4 CAPSULE, EXTENDED RELEASE ORAL ONCE
Refills: 0 | Status: DISCONTINUED | OUTPATIENT
Start: 2022-04-13 | End: 2022-04-13

## 2022-04-13 RX ORDER — FAMOTIDINE 10 MG/ML
20 INJECTION INTRAVENOUS ONCE
Refills: 0 | Status: COMPLETED | OUTPATIENT
Start: 2022-04-13 | End: 2022-04-13

## 2022-04-13 RX ORDER — MORPHINE SULFATE 50 MG/1
2 CAPSULE, EXTENDED RELEASE ORAL ONCE
Refills: 0 | Status: DISCONTINUED | OUTPATIENT
Start: 2022-04-13 | End: 2022-04-13

## 2022-04-13 RX ADMIN — SODIUM CHLORIDE 1000 MILLILITER(S): 9 INJECTION INTRAMUSCULAR; INTRAVENOUS; SUBCUTANEOUS at 22:54

## 2022-04-13 RX ADMIN — MORPHINE SULFATE 4 MILLIGRAM(S): 50 CAPSULE, EXTENDED RELEASE ORAL at 21:21

## 2022-04-13 RX ADMIN — Medication 30 MILLILITER(S): at 21:23

## 2022-04-13 RX ADMIN — SODIUM CHLORIDE 1000 MILLILITER(S): 9 INJECTION INTRAMUSCULAR; INTRAVENOUS; SUBCUTANEOUS at 20:27

## 2022-04-13 RX ADMIN — MORPHINE SULFATE 2 MILLIGRAM(S): 50 CAPSULE, EXTENDED RELEASE ORAL at 22:31

## 2022-04-13 RX ADMIN — MORPHINE SULFATE 2 MILLIGRAM(S): 50 CAPSULE, EXTENDED RELEASE ORAL at 21:56

## 2022-04-13 RX ADMIN — ONDANSETRON 4 MILLIGRAM(S): 8 TABLET, FILM COATED ORAL at 20:28

## 2022-04-13 RX ADMIN — SODIUM CHLORIDE 1000 MILLILITER(S): 9 INJECTION INTRAMUSCULAR; INTRAVENOUS; SUBCUTANEOUS at 21:57

## 2022-04-13 RX ADMIN — FAMOTIDINE 20 MILLIGRAM(S): 10 INJECTION INTRAVENOUS at 20:28

## 2022-04-13 RX ADMIN — SODIUM CHLORIDE 1000 MILLILITER(S): 9 INJECTION INTRAMUSCULAR; INTRAVENOUS; SUBCUTANEOUS at 21:22

## 2022-04-13 RX ADMIN — MORPHINE SULFATE 4 MILLIGRAM(S): 50 CAPSULE, EXTENDED RELEASE ORAL at 20:29

## 2022-04-13 NOTE — ED PROVIDER NOTE - PATIENT PORTAL LINK FT
Yes You can access the FollowMyHealth Patient Portal offered by [facility] by registering at the following website: [email or web address]. By joining [facility]’s FollowMyHealth portal, you will also be able to view your health information using other applications (apps) compatible with our system.

## 2022-04-13 NOTE — ED PROVIDER NOTE - NS ED MD DISPO DIVISION
[facility] [Normal Rate] : normal rate  [Regular Rhythm] : with a regular rhythm [Normal S1, S2] : normal S1 and S2 [No Carotid Bruits] : no carotid bruits [No Abdominal Bruit] : a ~M bruit was not heard ~T in the abdomen [Pedal Pulses Present] : the pedal pulses are present [No Edema] : there was no peripheral edema [Normal Posterior Cervical Nodes] : no posterior cervical lymphadenopathy [Normal Anterior Cervical Nodes] : no anterior cervical lymphadenopathy [Normal] : normal gait, coordination grossly intact, no focal deficits and deep tendon reflexes were 2+ and symmetric [Speech Grossly Normal] : speech grossly normal [Memory Grossly Normal] : memory grossly normal [Normal Affect] : the affect was normal [Alert and Oriented x3] : oriented to person, place, and time [Normal Mood] : the mood was normal [Normal Insight/Judgement] : insight and judgment were intact [de-identified] : + 1/6 systolic murmur at the apex

## 2022-04-13 NOTE — ED PROVIDER NOTE - ATTENDING CONTRIBUTION TO CARE
Patient known to ER for frequent etoh, being seen with PAULA Johnson, admits to drinking all day today, wants to stop but unable, endorses abd pain, CIWA 1-2.  Will obtain labs, give fluids, reassess, had US abd yesterday. Patient known to ER for frequent etoh, being seen with PAULA Johnson, admits to drinking all day today, wants to stop but unable, endorses abd pain, CIWA 1-2, not in willie withdrawal.  Will obtain labs, give fluids, reassess, had US abd yesterday and also CT with PO/oral contrast, denies chest pain, patient having his typical presentation.

## 2022-04-13 NOTE — ED ADULT NURSE NOTE - ED STAT RN HANDOFF DETAILS
Report received from PRETTY Lovell. Safety checks completed this shift. Safety rounds completed hourly.  IV sites checked Q2+remains WDL. Fall & skin precautions in place. Pending labs and US.

## 2022-04-13 NOTE — ED PROVIDER NOTE - PROGRESS NOTE DETAILS
Patient tolerating and drinking PO, lactate trending down, keeps c/o generalized body pains, will check CK.  Patient not in withdrawal, declines SW in am for help in placement in rehab, wants to go home. Patient tolerating and drinking PO, lactate trending down, keeps c/o generalized body pains, will check CK.  Patient not in withdrawal, declines SW in am for help in placement in rehab, wants to go home. Denies SI. Patient  resting comfortably, drinking fluids and juice.  labs and US without interval changes.  Does not want rehab.  Will dc with outpatient resources.  CK normal. Patient discharged but no ride for  until am, wants to sleep in ER, PO ativan given to help sleep and prevent withdrawal. Patient discharged but no ride for  until am, wants to sleep in ER, ativan to prevent withdrawal, more IVF, pain medication for diffuse body aches.

## 2022-04-13 NOTE — ED PROVIDER NOTE - CLINICAL SUMMARY MEDICAL DECISION MAKING FREE TEXT BOX
56y Male with PMHx of etoh abuse, recurrent ER visits presents to the ER for abdominal pain. Generalized abdominal pain, nausea and nonbloody vomiting in the setting of etoh use. Seen in ER the last 2 days. Had CT with mild gall bladder wall thickening and RUQ US without evidence of acute cholecystitis or gall stones. Vital sign stable, will get labs, IVF, meds, PO chall, dc

## 2022-04-13 NOTE — ED PROVIDER NOTE - OBJECTIVE STATEMENT
56y Male with PMHx of etoh abuse, recurrent ER visits presents to the ER for abdominal pain. Patient reports drinking unknown amount of alcohol today, having generalized abdominal pain, nausea and nonbloody vomiting. States son called EMS. Denies chest pain, SOB or difficulty breathing. Reports same pain as the last 2 days.     Of note, patient seen in ER the last 2 days. Had CT with mild gall bladder wall thickening and RUQ US without evidence of acute cholecystitis or gall stones.

## 2022-04-13 NOTE — ED ADULT NURSE NOTE - ED STAT RN HANDOFF DETAILS 2
Report endorsed to PRETTY Torres. Safety checks completed this shift. Safety rounds completed hourly.  IV sites checked Q2+remains WDL. Medications administered as ordered with no signs/symptoms of adverse reactions. Fall & skin precautions in place. Any issues endorsed to PRETTY Torres for follow up. Pending Discharge.

## 2022-04-13 NOTE — ED PROVIDER NOTE - NS ED ATTENDING STATEMENT MOD
This was a shared visit with the MARIA E. I reviewed and verified the documentation and independently performed the documented:

## 2022-04-13 NOTE — ED PROVIDER NOTE - NS ED ROS FT
Constitutional: (-) Fever, (-) Chills  Skin: (-) Rashes  Eyes: (-) Visual changes, (-) Discharge, (-) Redness  Ears: (-) Hearing loss, (-)Tinnitus, (-) Ear pain  Nose: (-) Nasal congestion, (-) Runny nose  Mouth/Throat: (-) Sore throat  CV: (-) Chest pain, (-) Palpitations  Resp: (-) Cough, (-) Shortness of breath  GI: (+) Abdominal pain, (+) Nausea, (+) Vomiting, (-) Diarrhea  : (-) Dysuria, (-) Hematuria, (-) Increased frequency  MSK: (-) Myalgias, (-) Back pain  Neuro: (-) Headache

## 2022-04-14 VITALS — TEMPERATURE: 98 F | HEART RATE: 87 BPM | DIASTOLIC BLOOD PRESSURE: 80 MMHG | SYSTOLIC BLOOD PRESSURE: 132 MMHG

## 2022-04-14 LAB
CK SERPL-CCNC: 394 U/L — HIGH (ref 26–308)
LACTATE SERPL-SCNC: 4.3 MMOL/L — CRITICAL HIGH (ref 0.7–2)

## 2022-04-14 PROCEDURE — 76700 US EXAM ABDOM COMPLETE: CPT | Mod: 26

## 2022-04-14 RX ORDER — MORPHINE SULFATE 50 MG/1
2 CAPSULE, EXTENDED RELEASE ORAL ONCE
Refills: 0 | Status: DISCONTINUED | OUTPATIENT
Start: 2022-04-14 | End: 2022-04-14

## 2022-04-14 RX ORDER — SODIUM CHLORIDE 9 MG/ML
1000 INJECTION INTRAMUSCULAR; INTRAVENOUS; SUBCUTANEOUS ONCE
Refills: 0 | Status: COMPLETED | OUTPATIENT
Start: 2022-04-14 | End: 2022-04-14

## 2022-04-14 RX ADMIN — Medication 1 MILLIGRAM(S): at 04:47

## 2022-04-14 RX ADMIN — SODIUM CHLORIDE 1000 MILLILITER(S): 9 INJECTION INTRAMUSCULAR; INTRAVENOUS; SUBCUTANEOUS at 04:50

## 2022-04-14 RX ADMIN — MORPHINE SULFATE 2 MILLIGRAM(S): 50 CAPSULE, EXTENDED RELEASE ORAL at 04:48

## 2022-04-14 RX ADMIN — MORPHINE SULFATE 2 MILLIGRAM(S): 50 CAPSULE, EXTENDED RELEASE ORAL at 05:30

## 2022-04-14 RX ADMIN — Medication 2 MILLIGRAM(S): at 03:45

## 2022-04-14 RX ADMIN — METHOCARBAMOL 750 MILLIGRAM(S): 500 TABLET, FILM COATED ORAL at 00:12

## 2022-04-14 NOTE — ED ADULT NURSE REASSESSMENT NOTE - NS ED NURSE REASSESS COMMENT FT1
Dr. tafoya made aware of lactate. As per Dr. Tafoya, ok to give pt PO fluids.
Pt requesting apple juice. Dr. tafoya made aware. As per Dr. Tafoya, ok to give pt apple juice.
Pt sleeping in stretcher, no acute distress noted. Respirations even and unlabored. Safety checks completed this shift. Safety rounds completed hourly. Fall & skin precautions in place. Pending US results.
Pt to be discharged, Dr. Capone made aware of Lactate . As per Dr. Capone, ok to discharge pt home with Lactate level 4.3. No intervention needed at this time. As per Dr. Capone, pt instructed to continue with fluids at home. Plan of care discussed with pt. Pt verbalized understanding and agreement.
Pt tolerated PO intake. Denies: nausea, vomiting, No acute distress noted. Dr. Capone made aware.
Received patient awake in stretcher; says he has a mild headache. Does not appear to be in any acute distress. VS stable. Discharge papers given.

## 2022-04-17 NOTE — ED ADULT TRIAGE NOTE - AS HEIGHT TYPE
ASSESSMENT/PLAN:  Pt is a 80 y/o male with pmhx of HTN, DM2, COVID coagulopathy (hx of PE) on eliquis, Carotid artery disease, BPH, b/l LE venous insufficiency presents to [facility] as a transfer from [facility] for evaluation of frequent bigminy PVCs  CKD stage 3 a- creatinine improving 1.33 today from 1.38  1 Renal - S/p CTA, trend renal function, continue Lasix 40mg po qd  2 CVS-EF normal.  BP acceptable. EP consulted no plans for ablation  3 Pulm-CT chest dose not appear to be in failure. Cont Nebs     RECOMMENDATIONS  -Continue Lasix 40 gm PO QD  -Continue to trend renal function daily.  -A/W resume bp meds except lisinopril for now, bmp fisrt  -Avoid nephrotoxins as able    Nighat Mei NP-C  [facility]  [phone]  stated

## 2022-04-26 NOTE — ED PROVIDER NOTE - NSTIMEPROVIDERCAREINITIATE_GEN_ER
10-Feb-2021 01:10 Doxycycline Counseling:  Patient counseled regarding possible photosensitivity and increased risk for sunburn.  Patient instructed to avoid sunlight, if possible.  When exposed to sunlight, patients should wear protective clothing, sunglasses, and sunscreen.  The patient was instructed to call the office immediately if the following severe adverse effects occur:  hearing changes, easy bruising/bleeding, severe headache, or vision changes.  The patient verbalized understanding of the proper use and possible adverse effects of doxycycline.  All of the patient's questions and concerns were addressed.

## 2022-04-30 ENCOUNTER — EMERGENCY (EMERGENCY)
Facility: HOSPITAL | Age: 57
LOS: 0 days | Discharge: HOME | End: 2022-05-01
Attending: EMERGENCY MEDICINE
Payer: MEDICAID

## 2022-04-30 VITALS
HEIGHT: 67 IN | WEIGHT: 154.98 LBS | DIASTOLIC BLOOD PRESSURE: 92 MMHG | HEART RATE: 110 BPM | SYSTOLIC BLOOD PRESSURE: 137 MMHG | OXYGEN SATURATION: 98 % | TEMPERATURE: 99 F | RESPIRATION RATE: 16 BRPM

## 2022-04-30 DIAGNOSIS — K29.20 ALCOHOLIC GASTRITIS WITHOUT BLEEDING: ICD-10-CM

## 2022-04-30 DIAGNOSIS — Z87.11 PERSONAL HISTORY OF PEPTIC ULCER DISEASE: ICD-10-CM

## 2022-04-30 DIAGNOSIS — E78.2 MIXED HYPERLIPIDEMIA: ICD-10-CM

## 2022-04-30 DIAGNOSIS — R10.84 GENERALIZED ABDOMINAL PAIN: ICD-10-CM

## 2022-04-30 DIAGNOSIS — F10.129 ALCOHOL ABUSE WITH INTOXICATION, UNSPECIFIED: ICD-10-CM

## 2022-04-30 DIAGNOSIS — R11.2 NAUSEA WITH VOMITING, UNSPECIFIED: ICD-10-CM

## 2022-04-30 DIAGNOSIS — F10.229 ALCOHOL DEPENDENCE WITH INTOXICATION, UNSPECIFIED: ICD-10-CM

## 2022-04-30 LAB
ALBUMIN SERPL ELPH-MCNC: 4.5 G/DL — SIGNIFICANT CHANGE UP (ref 3.3–5)
ALP SERPL-CCNC: 62 U/L — SIGNIFICANT CHANGE UP (ref 40–120)
ALT FLD-CCNC: 30 U/L — SIGNIFICANT CHANGE UP (ref 12–78)
ANION GAP SERPL CALC-SCNC: 20 MMOL/L — HIGH (ref 5–17)
ANISOCYTOSIS BLD QL: SLIGHT — SIGNIFICANT CHANGE UP
AST SERPL-CCNC: 35 U/L — SIGNIFICANT CHANGE UP (ref 15–37)
BASOPHILS # BLD AUTO: 0.08 K/UL — SIGNIFICANT CHANGE UP (ref 0–0.2)
BASOPHILS NFR BLD AUTO: 0.8 % — SIGNIFICANT CHANGE UP (ref 0–2)
BILIRUB SERPL-MCNC: 0.3 MG/DL — SIGNIFICANT CHANGE UP (ref 0.2–1.2)
BUN SERPL-MCNC: 15 MG/DL — SIGNIFICANT CHANGE UP (ref 7–23)
CALCIUM SERPL-MCNC: 10.2 MG/DL — HIGH (ref 8.5–10.1)
CHLORIDE SERPL-SCNC: 104 MMOL/L — SIGNIFICANT CHANGE UP (ref 96–108)
CO2 SERPL-SCNC: 21 MMOL/L — LOW (ref 22–31)
CREAT SERPL-MCNC: 1.49 MG/DL — HIGH (ref 0.5–1.3)
EGFR: 55 ML/MIN/1.73M2 — LOW
EOSINOPHIL # BLD AUTO: 0.06 K/UL — SIGNIFICANT CHANGE UP (ref 0–0.5)
EOSINOPHIL NFR BLD AUTO: 0.6 % — SIGNIFICANT CHANGE UP (ref 0–6)
ETHANOL SERPL-MCNC: 199 MG/DL — HIGH (ref 0–10)
GLUCOSE BLDC GLUCOMTR-MCNC: 108 MG/DL — HIGH (ref 70–99)
GLUCOSE SERPL-MCNC: 118 MG/DL — HIGH (ref 70–99)
HCT VFR BLD CALC: 49 % — SIGNIFICANT CHANGE UP (ref 39–50)
HGB BLD-MCNC: 15 G/DL — SIGNIFICANT CHANGE UP (ref 13–17)
IMM GRANULOCYTES NFR BLD AUTO: 0.5 % — SIGNIFICANT CHANGE UP (ref 0–1.5)
LIDOCAIN IGE QN: 137 U/L — SIGNIFICANT CHANGE UP (ref 73–393)
LYMPHOCYTES # BLD AUTO: 1.84 K/UL — SIGNIFICANT CHANGE UP (ref 1–3.3)
LYMPHOCYTES # BLD AUTO: 18.6 % — SIGNIFICANT CHANGE UP (ref 13–44)
MANUAL SMEAR VERIFICATION: SIGNIFICANT CHANGE UP
MCHC RBC-ENTMCNC: 22.7 PG — LOW (ref 27–34)
MCHC RBC-ENTMCNC: 30.6 G/DL — LOW (ref 32–36)
MCV RBC AUTO: 74.2 FL — LOW (ref 80–100)
MICROCYTES BLD QL: SLIGHT — SIGNIFICANT CHANGE UP
MONOCYTES # BLD AUTO: 0.36 K/UL — SIGNIFICANT CHANGE UP (ref 0–0.9)
MONOCYTES NFR BLD AUTO: 3.6 % — SIGNIFICANT CHANGE UP (ref 2–14)
NEUTROPHILS # BLD AUTO: 7.51 K/UL — HIGH (ref 1.8–7.4)
NEUTROPHILS NFR BLD AUTO: 75.9 % — SIGNIFICANT CHANGE UP (ref 43–77)
NRBC # BLD: 0 /100 WBCS — SIGNIFICANT CHANGE UP (ref 0–0)
PLAT MORPH BLD: NORMAL — SIGNIFICANT CHANGE UP
PLATELET # BLD AUTO: 458 K/UL — HIGH (ref 150–400)
POTASSIUM SERPL-MCNC: 3.8 MMOL/L — SIGNIFICANT CHANGE UP (ref 3.5–5.3)
POTASSIUM SERPL-SCNC: 3.8 MMOL/L — SIGNIFICANT CHANGE UP (ref 3.5–5.3)
PROT SERPL-MCNC: 9.6 GM/DL — HIGH (ref 6–8.3)
RBC # BLD: 6.6 M/UL — HIGH (ref 4.2–5.8)
RBC # FLD: 23 % — HIGH (ref 10.3–14.5)
RBC BLD AUTO: ABNORMAL
SODIUM SERPL-SCNC: 145 MMOL/L — SIGNIFICANT CHANGE UP (ref 135–145)
WBC # BLD: 9.9 K/UL — SIGNIFICANT CHANGE UP (ref 3.8–10.5)
WBC # FLD AUTO: 9.9 K/UL — SIGNIFICANT CHANGE UP (ref 3.8–10.5)

## 2022-04-30 PROCEDURE — 99285 EMERGENCY DEPT VISIT HI MDM: CPT

## 2022-04-30 PROCEDURE — 74176 CT ABD & PELVIS W/O CONTRAST: CPT | Mod: 26,MA

## 2022-04-30 RX ORDER — FAMOTIDINE 10 MG/ML
20 INJECTION INTRAVENOUS ONCE
Refills: 0 | Status: COMPLETED | OUTPATIENT
Start: 2022-04-30 | End: 2022-04-30

## 2022-04-30 RX ORDER — HALOPERIDOL DECANOATE 100 MG/ML
2.5 INJECTION INTRAMUSCULAR ONCE
Refills: 0 | Status: DISCONTINUED | OUTPATIENT
Start: 2022-04-30 | End: 2022-04-30

## 2022-04-30 RX ORDER — ACETAMINOPHEN 500 MG
650 TABLET ORAL ONCE
Refills: 0 | Status: COMPLETED | OUTPATIENT
Start: 2022-04-30 | End: 2022-04-30

## 2022-04-30 RX ORDER — FAMOTIDINE 10 MG/ML
20 INJECTION INTRAVENOUS ONCE
Refills: 0 | Status: DISCONTINUED | OUTPATIENT
Start: 2022-04-30 | End: 2022-04-30

## 2022-04-30 RX ORDER — HALOPERIDOL DECANOATE 100 MG/ML
2.5 INJECTION INTRAMUSCULAR ONCE
Refills: 0 | Status: COMPLETED | OUTPATIENT
Start: 2022-04-30 | End: 2022-04-30

## 2022-04-30 RX ADMIN — FAMOTIDINE 20 MILLIGRAM(S): 10 INJECTION INTRAVENOUS at 20:27

## 2022-04-30 RX ADMIN — HALOPERIDOL DECANOATE 2.5 MILLIGRAM(S): 100 INJECTION INTRAMUSCULAR at 20:26

## 2022-04-30 NOTE — ED PROVIDER NOTE - CARE PROVIDER_API CALL
[place] [address]  Phone: [phone]  Fax: [phone]  Follow Up Time: 4-6 Days

## 2022-04-30 NOTE — ED PROVIDER NOTE - PROGRESS NOTE DETAILS
Results reported to patient--grossly benign, labs shows etoh intox, otherwise normal results/imaging   Pt. reports feeling better after meds, clinically sober, ambulating with steady gait, demonstrates decision-making capacity   pt. agrees to f/u with primary care outpt. asap, referred to GI for additional f/u  pt. understands to return to ED if symptoms worsen; will d/c

## 2022-04-30 NOTE — ED PROVIDER NOTE - PATIENT PORTAL LINK FT
You can access the FollowMyHealth Patient Portal offered by [facility] by registering at the following website: [email or web address]. By joining [employer]’s FollowMyHealth portal, you will also be able to view your health information using other applications (apps) compatible with our system.

## 2022-04-30 NOTE — ED ADULT NURSE NOTE - NSIMPLEMENTINTERV_GEN_ALL_ED
Implemented All Fall Risk Interventions:  [place] to call system. Call bell, personal items and telephone within reach. Instruct patient to call for assistance. Room bathroom lighting operational. Non-slip footwear when patient is off stretcher. Physically safe environment: no spills, clutter or unnecessary equipment. Stretcher in lowest position, wheels locked, appropriate side rails in place. Provide visual cue, wrist band, yellow gown, etc. Monitor gait and stability. Monitor for mental status changes and reorient to person, place, and time. Review medications for side effects contributing to fall risk. Reinforce activity limits and safety measures with patient and family.

## 2022-04-30 NOTE — ED ADULT NURSE NOTE - OBJECTIVE STATEMENT
Pt currently intoxicated and c/o pain and when asked where he pointed to his stomach. Pt is currently vomiting in ED. When asked when his last drink was he stated today and that he drinks 2-3 times a day.

## 2022-04-30 NOTE — ED PROVIDER NOTE - PHYSICAL EXAMINATION
Vitals: tachy at 110, htn at 137/92, otherwise WNL  Gen: AAOx3, yelling out loud in ER creating a scene, screaming at staff and other patients, demands morphine for pain  Head: ncat, perrla, eomi b/l  Neck: supple, no lymphadenopathy, no midline deviation  Heart: rrr, no m/r/g  Lungs: CTA b/l, no rales/ronchi/wheezes  Abd: soft, nontender, non-distended, no rebound or guarding  Ext: no clubbing/cyanosis/edema  Neuro: sensation and muscle strength intact b/l, steady gait

## 2022-04-30 NOTE — ED PROVIDER NOTE - OBJECTIVE STATEMENT
56 yo M  presents to the ER for abdominal pain. Patient reports drinking unknown amount of alcohol today, having generalized abdominal pain, nausea and nonbloody vomiting. States son called EMS. Denies chest pain, SOB or difficulty breathing. Reports same pain as the last 2 days.   ROS: negative for fever, cough, headache, chest pain, shortness of breath, nausea, vomiting, diarrhea, rash, paresthesia, and weakness--all other systems reviewed are negative.   PMH: chronic alcoholic gastritis, PUD; Meds: Denies; SH: + chronic alcoholism

## 2022-04-30 NOTE — ED ADULT TRIAGE NOTE - CADM TRG TX PRIOR TO ARRIVAL
Patient called back. Weight was 246 lbs today. Last month was 257 lbs.  No increased swelling to BLE other than swelling to LLE-she has a broken toe.  No abdominal bloating or swelling. She is nauseated due to antibiotics.  No SOB at rest. No MEDRANO, orthopnea or PND. Sleeping in a recliner due to back pain.     Patient has been advised to record weights on a daily basis.  Patient to call with a weight gain greater than 3 pounds in one day or 5 pounds in one week. Patient advised to follow a 2 gram sodium (2000mg)/2 liter (64 ounces) fluid restricted diet. Patient counseled to avoid the use of NSAID including but not limited to Ibuprofen, Advil and Aleve. Encouraged to check with community pharmacist with all OTC (over the counter) medications to ensure product does not contain NSAIDs.      none

## 2022-04-30 NOTE — ED PROVIDER NOTE - CLINICAL SUMMARY MEDICAL DECISION MAKING FREE TEXT BOX
58 yo M with alcohol intox, likely gastritis, doubt pancreatitis, ischemia  -recent imaging (about 2 weeks prior US shows GB wall thickening without acute yanick or other pathology  -basic labs, lipase, lactate, etoh, finger stick, ekg, iv, hydration bolus  -pt.'s level of pain is unusual for physical exam, will obtain CTA abd/pel to r/o bowel ischemia, tylenol for pain, pepcid  -f/u results, reeval 56 yo M with alcohol intox, likely gastritis, doubt pancreatitis; chart review shows multiple prior visits for similar complaints  -recent imaging (about 2 weeks prior US shows GB wall thickening without acute yanick or other pathology  -basic labs, lipase, lactate, etoh, finger stick, ekg, iv, hydration bolus  -non-con CT ab/pel, Tylenol for pain, pepcid  -f/u results, reeval

## 2022-05-01 VITALS
SYSTOLIC BLOOD PRESSURE: 137 MMHG | RESPIRATION RATE: 18 BRPM | HEART RATE: 115 BPM | DIASTOLIC BLOOD PRESSURE: 91 MMHG | TEMPERATURE: 99 F | OXYGEN SATURATION: 96 %

## 2022-05-04 ENCOUNTER — INPATIENT (INPATIENT)
Facility: HOSPITAL | Age: 57
LOS: 2 days | Discharge: HOME | End: 2022-05-07
Attending: INTERNAL MEDICINE | Admitting: INTERNAL MEDICINE
Payer: MEDICAID

## 2022-05-04 VITALS
RESPIRATION RATE: 18 BRPM | TEMPERATURE: 98 F | SYSTOLIC BLOOD PRESSURE: 125 MMHG | HEART RATE: 101 BPM | WEIGHT: 179.9 LBS | DIASTOLIC BLOOD PRESSURE: 84 MMHG | OXYGEN SATURATION: 96 % | HEIGHT: 67 IN

## 2022-05-04 DIAGNOSIS — F10.20 ALCOHOL DEPENDENCE, UNCOMPLICATED: ICD-10-CM

## 2022-05-04 DIAGNOSIS — E78.2 MIXED HYPERLIPIDEMIA: ICD-10-CM

## 2022-05-04 DIAGNOSIS — E87.6 HYPOKALEMIA: ICD-10-CM

## 2022-05-04 DIAGNOSIS — E78.5 HYPERLIPIDEMIA, UNSPECIFIED: ICD-10-CM

## 2022-05-04 DIAGNOSIS — K92.2 GASTROINTESTINAL HEMORRHAGE, UNSPECIFIED: ICD-10-CM

## 2022-05-04 DIAGNOSIS — K29.71 GASTRITIS, UNSPECIFIED, WITH BLEEDING: ICD-10-CM

## 2022-05-04 DIAGNOSIS — I10 ESSENTIAL (PRIMARY) HYPERTENSION: ICD-10-CM

## 2022-05-04 LAB
ALBUMIN SERPL ELPH-MCNC: 3.8 G/DL — SIGNIFICANT CHANGE UP (ref 3.3–5)
ALP SERPL-CCNC: 55 U/L — SIGNIFICANT CHANGE UP (ref 40–120)
ALT FLD-CCNC: 28 U/L — SIGNIFICANT CHANGE UP (ref 12–78)
ANION GAP SERPL CALC-SCNC: 11 MMOL/L — SIGNIFICANT CHANGE UP (ref 5–17)
ANISOCYTOSIS BLD QL: SLIGHT — SIGNIFICANT CHANGE UP
APTT BLD: 30.3 SEC — SIGNIFICANT CHANGE UP (ref 27.5–35.5)
AST SERPL-CCNC: 33 U/L — SIGNIFICANT CHANGE UP (ref 15–37)
BASOPHILS # BLD AUTO: 0.07 K/UL — SIGNIFICANT CHANGE UP (ref 0–0.2)
BASOPHILS NFR BLD AUTO: 1 % — SIGNIFICANT CHANGE UP (ref 0–2)
BILIRUB SERPL-MCNC: 0.4 MG/DL — SIGNIFICANT CHANGE UP (ref 0.2–1.2)
BUN SERPL-MCNC: 6 MG/DL — LOW (ref 7–23)
CALCIUM SERPL-MCNC: 9.1 MG/DL — SIGNIFICANT CHANGE UP (ref 8.5–10.1)
CHLORIDE SERPL-SCNC: 97 MMOL/L — SIGNIFICANT CHANGE UP (ref 96–108)
CO2 SERPL-SCNC: 26 MMOL/L — SIGNIFICANT CHANGE UP (ref 22–31)
CREAT SERPL-MCNC: 1.13 MG/DL — SIGNIFICANT CHANGE UP (ref 0.5–1.3)
EGFR: 77 ML/MIN/1.73M2 — SIGNIFICANT CHANGE UP
EOSINOPHIL # BLD AUTO: 0.01 K/UL — SIGNIFICANT CHANGE UP (ref 0–0.5)
EOSINOPHIL NFR BLD AUTO: 0.1 % — SIGNIFICANT CHANGE UP (ref 0–6)
ETHANOL SERPL-MCNC: 181 MG/DL — HIGH (ref 0–10)
FLUAV AG NPH QL: SIGNIFICANT CHANGE UP
FLUBV AG NPH QL: SIGNIFICANT CHANGE UP
GLUCOSE SERPL-MCNC: 110 MG/DL — HIGH (ref 70–99)
HCT VFR BLD CALC: 38.1 % — LOW (ref 39–50)
HGB BLD-MCNC: 11.9 G/DL — LOW (ref 13–17)
IMM GRANULOCYTES NFR BLD AUTO: 0.7 % — SIGNIFICANT CHANGE UP (ref 0–1.5)
INR BLD: 0.99 RATIO — SIGNIFICANT CHANGE UP (ref 0.88–1.16)
LIDOCAIN IGE QN: 240 U/L — SIGNIFICANT CHANGE UP (ref 73–393)
LYMPHOCYTES # BLD AUTO: 1.76 K/UL — SIGNIFICANT CHANGE UP (ref 1–3.3)
LYMPHOCYTES # BLD AUTO: 24.2 % — SIGNIFICANT CHANGE UP (ref 13–44)
MAGNESIUM SERPL-MCNC: 2.2 MG/DL — SIGNIFICANT CHANGE UP (ref 1.6–2.6)
MANUAL SMEAR VERIFICATION: SIGNIFICANT CHANGE UP
MCHC RBC-ENTMCNC: 23 PG — LOW (ref 27–34)
MCHC RBC-ENTMCNC: 31.2 G/DL — LOW (ref 32–36)
MCV RBC AUTO: 73.7 FL — LOW (ref 80–100)
MICROCYTES BLD QL: SLIGHT — SIGNIFICANT CHANGE UP
MONOCYTES # BLD AUTO: 0.59 K/UL — SIGNIFICANT CHANGE UP (ref 0–0.9)
MONOCYTES NFR BLD AUTO: 8.1 % — SIGNIFICANT CHANGE UP (ref 2–14)
NEUTROPHILS # BLD AUTO: 4.8 K/UL — SIGNIFICANT CHANGE UP (ref 1.8–7.4)
NEUTROPHILS NFR BLD AUTO: 65.9 % — SIGNIFICANT CHANGE UP (ref 43–77)
NRBC # BLD: 0 /100 WBCS — SIGNIFICANT CHANGE UP (ref 0–0)
OB PNL STL: POSITIVE
PLAT MORPH BLD: NORMAL — SIGNIFICANT CHANGE UP
PLATELET # BLD AUTO: 386 K/UL — SIGNIFICANT CHANGE UP (ref 150–400)
POTASSIUM SERPL-MCNC: 3.2 MMOL/L — LOW (ref 3.5–5.3)
POTASSIUM SERPL-SCNC: 3.2 MMOL/L — LOW (ref 3.5–5.3)
PROT SERPL-MCNC: 8.5 GM/DL — HIGH (ref 6–8.3)
PROTHROM AB SERPL-ACNC: 11.8 SEC — SIGNIFICANT CHANGE UP (ref 10.5–13.4)
RBC # BLD: 5.17 M/UL — SIGNIFICANT CHANGE UP (ref 4.2–5.8)
RBC # FLD: 21.6 % — HIGH (ref 10.3–14.5)
RBC BLD AUTO: ABNORMAL
SARS-COV-2 RNA SPEC QL NAA+PROBE: SIGNIFICANT CHANGE UP
SODIUM SERPL-SCNC: 134 MMOL/L — LOW (ref 135–145)
TROPONIN I, HIGH SENSITIVITY RESULT: 8.2 NG/L — SIGNIFICANT CHANGE UP
WBC # BLD: 7.28 K/UL — SIGNIFICANT CHANGE UP (ref 3.8–10.5)
WBC # FLD AUTO: 7.28 K/UL — SIGNIFICANT CHANGE UP (ref 3.8–10.5)

## 2022-05-04 PROCEDURE — 99223 1ST HOSP IP/OBS HIGH 75: CPT

## 2022-05-04 PROCEDURE — 99284 EMERGENCY DEPT VISIT MOD MDM: CPT

## 2022-05-04 PROCEDURE — 71045 X-RAY EXAM CHEST 1 VIEW: CPT | Mod: 26

## 2022-05-04 RX ORDER — LANOLIN ALCOHOL/MO/W.PET/CERES
3 CREAM (GRAM) TOPICAL AT BEDTIME
Refills: 0 | Status: DISCONTINUED | OUTPATIENT
Start: 2022-05-04 | End: 2022-05-07

## 2022-05-04 RX ORDER — POTASSIUM CHLORIDE 20 MEQ
10 PACKET (EA) ORAL
Refills: 0 | Status: COMPLETED | OUTPATIENT
Start: 2022-05-04 | End: 2022-05-04

## 2022-05-04 RX ORDER — MORPHINE SULFATE 50 MG/1
4 CAPSULE, EXTENDED RELEASE ORAL ONCE
Refills: 0 | Status: DISCONTINUED | OUTPATIENT
Start: 2022-05-04 | End: 2022-05-04

## 2022-05-04 RX ORDER — DEXTROSE 50 % IN WATER 50 %
15 SYRINGE (ML) INTRAVENOUS ONCE
Refills: 0 | Status: DISCONTINUED | OUTPATIENT
Start: 2022-05-04 | End: 2022-05-07

## 2022-05-04 RX ORDER — DEXTROSE 50 % IN WATER 50 %
12.5 SYRINGE (ML) INTRAVENOUS ONCE
Refills: 0 | Status: DISCONTINUED | OUTPATIENT
Start: 2022-05-04 | End: 2022-05-07

## 2022-05-04 RX ORDER — FOLIC ACID 0.8 MG
1 TABLET ORAL DAILY
Refills: 0 | Status: DISCONTINUED | OUTPATIENT
Start: 2022-05-04 | End: 2022-05-07

## 2022-05-04 RX ORDER — SODIUM CHLORIDE 9 MG/ML
1000 INJECTION, SOLUTION INTRAVENOUS ONCE
Refills: 0 | Status: COMPLETED | OUTPATIENT
Start: 2022-05-04 | End: 2022-05-04

## 2022-05-04 RX ORDER — ACETAMINOPHEN 500 MG
650 TABLET ORAL EVERY 6 HOURS
Refills: 0 | Status: DISCONTINUED | OUTPATIENT
Start: 2022-05-04 | End: 2022-05-07

## 2022-05-04 RX ORDER — SODIUM CHLORIDE 9 MG/ML
1000 INJECTION INTRAMUSCULAR; INTRAVENOUS; SUBCUTANEOUS
Refills: 0 | Status: DISCONTINUED | OUTPATIENT
Start: 2022-05-04 | End: 2022-05-07

## 2022-05-04 RX ORDER — SIMVASTATIN 20 MG/1
20 TABLET, FILM COATED ORAL AT BEDTIME
Refills: 0 | Status: DISCONTINUED | OUTPATIENT
Start: 2022-05-04 | End: 2022-05-07

## 2022-05-04 RX ORDER — ONDANSETRON 8 MG/1
4 TABLET, FILM COATED ORAL EVERY 6 HOURS
Refills: 0 | Status: DISCONTINUED | OUTPATIENT
Start: 2022-05-04 | End: 2022-05-07

## 2022-05-04 RX ORDER — SUCRALFATE 1 G
1 TABLET ORAL
Refills: 0 | Status: DISCONTINUED | OUTPATIENT
Start: 2022-05-04 | End: 2022-05-07

## 2022-05-04 RX ORDER — DEXTROSE 50 % IN WATER 50 %
25 SYRINGE (ML) INTRAVENOUS ONCE
Refills: 0 | Status: DISCONTINUED | OUTPATIENT
Start: 2022-05-04 | End: 2022-05-07

## 2022-05-04 RX ORDER — THIAMINE MONONITRATE (VIT B1) 100 MG
100 TABLET ORAL DAILY
Refills: 0 | Status: DISCONTINUED | OUTPATIENT
Start: 2022-05-04 | End: 2022-05-07

## 2022-05-04 RX ORDER — THIAMINE MONONITRATE (VIT B1) 100 MG
500 TABLET ORAL ONCE
Refills: 0 | Status: COMPLETED | OUTPATIENT
Start: 2022-05-04 | End: 2022-05-04

## 2022-05-04 RX ORDER — PANTOPRAZOLE SODIUM 20 MG/1
40 TABLET, DELAYED RELEASE ORAL ONCE
Refills: 0 | Status: COMPLETED | OUTPATIENT
Start: 2022-05-04 | End: 2022-05-04

## 2022-05-04 RX ORDER — PHENOBARBITAL 60 MG
260 TABLET ORAL ONCE
Refills: 0 | Status: DISCONTINUED | OUTPATIENT
Start: 2022-05-04 | End: 2022-05-04

## 2022-05-04 RX ORDER — ONDANSETRON 8 MG/1
8 TABLET, FILM COATED ORAL ONCE
Refills: 0 | Status: COMPLETED | OUTPATIENT
Start: 2022-05-04 | End: 2022-05-04

## 2022-05-04 RX ORDER — GLUCAGON INJECTION, SOLUTION 0.5 MG/.1ML
1 INJECTION, SOLUTION SUBCUTANEOUS ONCE
Refills: 0 | Status: DISCONTINUED | OUTPATIENT
Start: 2022-05-04 | End: 2022-05-07

## 2022-05-04 RX ORDER — PANTOPRAZOLE SODIUM 20 MG/1
40 TABLET, DELAYED RELEASE ORAL
Refills: 0 | Status: DISCONTINUED | OUTPATIENT
Start: 2022-05-04 | End: 2022-05-07

## 2022-05-04 RX ORDER — OXYCODONE HYDROCHLORIDE 5 MG/1
5 TABLET ORAL EVERY 8 HOURS
Refills: 0 | Status: DISCONTINUED | OUTPATIENT
Start: 2022-05-04 | End: 2022-05-07

## 2022-05-04 RX ADMIN — Medication 100 MILLIEQUIVALENT(S): at 17:41

## 2022-05-04 RX ADMIN — Medication 3 MILLIGRAM(S): at 20:55

## 2022-05-04 RX ADMIN — MORPHINE SULFATE 4 MILLIGRAM(S): 50 CAPSULE, EXTENDED RELEASE ORAL at 12:58

## 2022-05-04 RX ADMIN — Medication 1 GRAM(S): at 18:59

## 2022-05-04 RX ADMIN — Medication 260 MILLIGRAM(S): at 13:11

## 2022-05-04 RX ADMIN — OXYCODONE HYDROCHLORIDE 5 MILLIGRAM(S): 5 TABLET ORAL at 20:53

## 2022-05-04 RX ADMIN — SODIUM CHLORIDE 1000 MILLILITER(S): 9 INJECTION, SOLUTION INTRAVENOUS at 13:41

## 2022-05-04 RX ADMIN — Medication 105 MILLIGRAM(S): at 13:19

## 2022-05-04 RX ADMIN — PANTOPRAZOLE SODIUM 40 MILLIGRAM(S): 20 TABLET, DELAYED RELEASE ORAL at 18:59

## 2022-05-04 RX ADMIN — SODIUM CHLORIDE 125 MILLILITER(S): 9 INJECTION INTRAMUSCULAR; INTRAVENOUS; SUBCUTANEOUS at 20:55

## 2022-05-04 RX ADMIN — Medication 30 MILLILITER(S): at 20:18

## 2022-05-04 RX ADMIN — PANTOPRAZOLE SODIUM 40 MILLIGRAM(S): 20 TABLET, DELAYED RELEASE ORAL at 12:58

## 2022-05-04 RX ADMIN — SIMVASTATIN 20 MILLIGRAM(S): 20 TABLET, FILM COATED ORAL at 20:54

## 2022-05-04 RX ADMIN — Medication 100 MILLIEQUIVALENT(S): at 15:58

## 2022-05-04 RX ADMIN — OXYCODONE HYDROCHLORIDE 5 MILLIGRAM(S): 5 TABLET ORAL at 23:06

## 2022-05-04 RX ADMIN — ONDANSETRON 8 MILLIGRAM(S): 8 TABLET, FILM COATED ORAL at 12:57

## 2022-05-04 RX ADMIN — Medication 100 MILLIEQUIVALENT(S): at 18:52

## 2022-05-04 NOTE — ED PROVIDER NOTE - GASTROINTESTINAL, MLM
Abdomen soft, ttp in the epigastric area, no guarding. Abdomen soft, ttp in the epigastric area, no guarding. +brown/greenish stool in the rectal vault, no BRBPR. chaperone: Dr. Maya

## 2022-05-04 NOTE — ED ADULT TRIAGE NOTE - CHIEF COMPLAINT QUOTE
pt c/o epigastric pain radiating to back and nausea x 1 week. pt states took rx famotidine with no relief. pt states drinks alcohol daily. pt awake, alert, steady gait in triage. pt c/o epigastric pain radiating to back, nausea, vomiting x 1 week. pt states took rx famotidine with no relief. pt states drinks alcohol daily. pt awake, alert, steady gait in triage.

## 2022-05-04 NOTE — ED PROVIDER NOTE - CLINICAL SUMMARY MEDICAL DECISION MAKING FREE TEXT BOX
58 yo M, pmhx gastritis, alcohol abuse disorder c/b DTs, PUD, presenting to the ED c/o epigastric pain radiating to the back w/ nausea and vomiting for the past week. Patient has known h/o alcohol abuse c/b bouts of epigastric pain and UGIB. Pt's symptoms are likely d/t continued etoh abuse. +epigastric ttp on exam and rectal exam shows brown/greenish stool w/o BRBPR or melena.   -will send guaiac and medical labs  -will obtain CXR to r/o free air  -IV PPI, fluids, morphine, zofran, thiamine  -CIWA protocol placed and will cover w/ IV phenobarb  -reassess

## 2022-05-04 NOTE — ED ADULT NURSE NOTE - CHIEF COMPLAINT QUOTE
pt c/o epigastric pain radiating to back, nausea, vomiting x 1 week. pt states took rx famotidine with no relief. pt states drinks alcohol daily. pt awake, alert, steady gait in triage.

## 2022-05-04 NOTE — ED ADULT NURSE NOTE - GENITOURINARY ASSESSMENT
----- Message from Karen Clayton DO sent at 2/5/2021  2:19 PM CST -----  No sign of diabetes  LDL cholesterol is a little high - goal is less than 130  Blood sugar is a little elevated  Normal kidney and liver function  No concerns.    
Per snapshot, left detailed message with results.  Advise patient to call clinic back with any questions.    
- - -

## 2022-05-04 NOTE — H&P ADULT - HISTORY OF PRESENT ILLNESS
57 years old male with h/o HTN, HLD, ETOH use with h/o withdrawal and DTs present to ED with complain of coffee ground emesis and abdominal pain. Patient has been having nausea vomiting for last 8 days. He noted coffee ground emesis for last 4-5 days and black stool for last 2 days. Reported severe sharp epigastric pain, constant, 9/10 pain radiate to the back  Hemodynamically stable, afebrile, sat well at RA. No leukocytosis, Hb 11.9, Plt 386, Na 134, K 3.2, Cr 1.13, lipase 240, alcohol 181, FOBT +. Received 1L LR, IV morhpine, IV thiamine, Zofran, phenobarbital IV 260mg, IV potassium 10mEq x3.     SH: active alcohol use

## 2022-05-04 NOTE — ED ADULT NURSE NOTE - NSIMPLEMENTINTERV_GEN_ALL_ED
Implemented All Fall with Harm Risk Interventions:  [place] to call system. Call bell, personal items and telephone within reach. Instruct patient to call for assistance. Room bathroom lighting operational. Non-slip footwear when patient is off stretcher. Physically safe environment: no spills, clutter or unnecessary equipment. Stretcher in lowest position, wheels locked, appropriate side rails in place. Provide visual cue, wrist band, yellow gown, etc. Monitor gait and stability. Monitor for mental status changes and reorient to person, place, and time. Review medications for side effects contributing to fall risk. Reinforce activity limits and safety measures with patient and family. Provide visual clues: red socks.

## 2022-05-04 NOTE — H&P ADULT - ASSESSMENT
57 years old male with h/o HTN, HLD, ETOH use with h/o withdrawal and DTs present to ED with complain of coffee ground emesis and abdominal pain. Patient has been having nausea vomiting for last 8 days. He noted coffee ground emesis for last 4-5 days and black stool for last 2 days. Reported severe sharp epigastric pain, constant, 9/10 pain radiate to the back  Hemodynamically stable, afebrile, sat well at RA. No leukocytosis, Hb 11.9, Plt 386, Na 134, K 3.2, Cr 1.13, lipase 240, alcohol 181, FOBT +. Received 1L LR, IV morhpine, IV thiamine, Zofran, phenobarbital IV 260mg, IV potassium 10mEq x3.       Admitted with coffee ground emesis

## 2022-05-04 NOTE — CONSULT NOTE ADULT - SUBJECTIVE AND OBJECTIVE BOX
full consult dictated    Plan:  Pt with h/o etoh abuse admitted with alcohol intox. Pt with coffee ground emesis and melena x 2 days.  H/H stable.  Will follow CBC; Start pt on protonix and carafate. Will follow.

## 2022-05-04 NOTE — ED PROVIDER NOTE - OBJECTIVE STATEMENT
58 yo M, pmhx gastritis, alcohol abuse disorder c/b DTs, PUD, presenting to the ED c/o epigastric pain radiating to the back w/ nausea and vomiting for the past week. Pt has been unable to tolerate PO and states he has a burning like sensation in his abdominal, radiating up his chest, especially noted after eating. Pt has been taking pepcid 40mg QD w/o relief. He endorses that he still drinks alcohol, primarily at night to help himself sleep. Pt has noted coffee ground emesis w/ occasional bright red blood as well as small dark stools.  He has not seen GI recently.

## 2022-05-04 NOTE — H&P ADULT - PROBLEM SELECTOR PLAN 1
present with Nausea, coffee ground emesis, epigastric pain and black stool  Hb stable, FOBT +  IV pantoprazole 40mg bid  Continue sucralfate 1g qid  Monitor H/H  IV fluid  NPO  GI consulted- Dr Sy  Low MCV- check iron study with AM labs

## 2022-05-04 NOTE — ED ADULT NURSE NOTE - OBJECTIVE STATEMENT
pt c/o epigastric pain radiating to back, nausea, vomiting x 1 week. pt states took rx famotidine with no relief. pt states drinks alcohol daily. pt awake, alert, steady gait in triage. no tremor no headahce ;last drink yesterday. burning sensation stomach

## 2022-05-04 NOTE — H&P ADULT - PROBLEM SELECTOR PLAN 3
ETOH use with h/o withdrawal and DTs  Reportedly last use was 2 days ago but serum alcohol is 181  Received phenobarbital IV 260mg, IV thiamine  Continue thiamine, folate and multivitamin  Monitor for alcohol withdrawal  If signs of withdrawal, will then place patient on CIWA protocol

## 2022-05-04 NOTE — H&P ADULT - NSHPPHYSICALEXAM_GEN_ALL_CORE
CONSTITUTIONAL: Well developed, well nourished, alert and cooperative, mild distress due to pain   EYES: PERRL, no scleral icterus  ENT: Mucosa moist, tongue normal.  NECK: Neck supple, trachea midline, non-tender.  CARDIAC: Normal S1 and S2. Regular rate and rhythms. No murmurs.  No Pedal edema. Peripheral pulses intact  LUNGS: Clear to auscultation, equal air entry both lungs. No rales, rhonchi, wheezing. Normal respiratory effort.   ABDOMEN: Mild epigastric tenderness +. No guarding or rebound tenderness. No hepatomegaly or splenomegaly. Bowel sound normal.   MUSCULOSKELETAL: Normocephalic, atraumatic. No significant deformity or joint abnormality  NEUROLOGICAL: No gross motor or sensory deficits  PSYCHIATRIC: A&O x 3, appropriate mood and affect

## 2022-05-05 LAB
ALBUMIN SERPL ELPH-MCNC: 3.2 G/DL — LOW (ref 3.3–5)
ALP SERPL-CCNC: 49 U/L — SIGNIFICANT CHANGE UP (ref 40–120)
ALT FLD-CCNC: 23 U/L — SIGNIFICANT CHANGE UP (ref 12–78)
ANION GAP SERPL CALC-SCNC: 7 MMOL/L — SIGNIFICANT CHANGE UP (ref 5–17)
AST SERPL-CCNC: 22 U/L — SIGNIFICANT CHANGE UP (ref 15–37)
BILIRUB SERPL-MCNC: 0.7 MG/DL — SIGNIFICANT CHANGE UP (ref 0.2–1.2)
BLD GP AB SCN SERPL QL: SIGNIFICANT CHANGE UP
BUN SERPL-MCNC: 8 MG/DL — SIGNIFICANT CHANGE UP (ref 7–23)
CALCIUM SERPL-MCNC: 8.4 MG/DL — LOW (ref 8.5–10.1)
CHLORIDE SERPL-SCNC: 103 MMOL/L — SIGNIFICANT CHANGE UP (ref 96–108)
CO2 SERPL-SCNC: 28 MMOL/L — SIGNIFICANT CHANGE UP (ref 22–31)
CREAT SERPL-MCNC: 1.13 MG/DL — SIGNIFICANT CHANGE UP (ref 0.5–1.3)
EGFR: 77 ML/MIN/1.73M2 — SIGNIFICANT CHANGE UP
FERRITIN SERPL-MCNC: 22 NG/ML — LOW (ref 30–400)
GLUCOSE BLDC GLUCOMTR-MCNC: 104 MG/DL — HIGH (ref 70–99)
GLUCOSE BLDC GLUCOMTR-MCNC: 109 MG/DL — HIGH (ref 70–99)
GLUCOSE BLDC GLUCOMTR-MCNC: 98 MG/DL — SIGNIFICANT CHANGE UP (ref 70–99)
GLUCOSE SERPL-MCNC: 100 MG/DL — HIGH (ref 70–99)
HCT VFR BLD CALC: 34.7 % — LOW (ref 39–50)
HGB BLD-MCNC: 10.7 G/DL — LOW (ref 13–17)
IRON SATN MFR SERPL: 116 UG/DL — SIGNIFICANT CHANGE UP (ref 45–165)
IRON SATN MFR SERPL: 31 % — SIGNIFICANT CHANGE UP (ref 16–55)
MAGNESIUM SERPL-MCNC: 2.1 MG/DL — SIGNIFICANT CHANGE UP (ref 1.6–2.6)
MCHC RBC-ENTMCNC: 23.3 PG — LOW (ref 27–34)
MCHC RBC-ENTMCNC: 30.8 G/DL — LOW (ref 32–36)
MCV RBC AUTO: 75.4 FL — LOW (ref 80–100)
NRBC # BLD: 0 /100 WBCS — SIGNIFICANT CHANGE UP (ref 0–0)
PHOSPHATE SERPL-MCNC: 2.3 MG/DL — LOW (ref 2.5–4.5)
PLATELET # BLD AUTO: 261 K/UL — SIGNIFICANT CHANGE UP (ref 150–400)
POTASSIUM SERPL-MCNC: 3.3 MMOL/L — LOW (ref 3.5–5.3)
POTASSIUM SERPL-SCNC: 3.3 MMOL/L — LOW (ref 3.5–5.3)
PROT SERPL-MCNC: 6.9 GM/DL — SIGNIFICANT CHANGE UP (ref 6–8.3)
RBC # BLD: 4.6 M/UL — SIGNIFICANT CHANGE UP (ref 4.2–5.8)
RBC # FLD: 21.6 % — HIGH (ref 10.3–14.5)
SODIUM SERPL-SCNC: 138 MMOL/L — SIGNIFICANT CHANGE UP (ref 135–145)
TIBC SERPL-MCNC: 373 UG/DL — SIGNIFICANT CHANGE UP (ref 220–430)
UIBC SERPL-MCNC: 257 UG/DL — SIGNIFICANT CHANGE UP (ref 110–370)
WBC # BLD: 4.55 K/UL — SIGNIFICANT CHANGE UP (ref 3.8–10.5)
WBC # FLD AUTO: 4.55 K/UL — SIGNIFICANT CHANGE UP (ref 3.8–10.5)

## 2022-05-05 PROCEDURE — 99233 SBSQ HOSP IP/OBS HIGH 50: CPT

## 2022-05-05 PROCEDURE — 93010 ELECTROCARDIOGRAM REPORT: CPT

## 2022-05-05 RX ORDER — POTASSIUM CHLORIDE 20 MEQ
40 PACKET (EA) ORAL ONCE
Refills: 0 | Status: COMPLETED | OUTPATIENT
Start: 2022-05-05 | End: 2022-05-05

## 2022-05-05 RX ADMIN — Medication 1 GRAM(S): at 00:11

## 2022-05-05 RX ADMIN — Medication 4 MILLIGRAM(S): at 07:11

## 2022-05-05 RX ADMIN — Medication 1 GRAM(S): at 05:41

## 2022-05-05 RX ADMIN — Medication 1 MILLIGRAM(S): at 11:30

## 2022-05-05 RX ADMIN — Medication 4 MILLIGRAM(S): at 11:30

## 2022-05-05 RX ADMIN — Medication 1 GRAM(S): at 17:15

## 2022-05-05 RX ADMIN — Medication 100 MILLIGRAM(S): at 11:29

## 2022-05-05 RX ADMIN — Medication 1 TABLET(S): at 11:29

## 2022-05-05 RX ADMIN — Medication 3 MILLIGRAM(S): at 21:34

## 2022-05-05 RX ADMIN — Medication 4 MILLIGRAM(S): at 13:06

## 2022-05-05 RX ADMIN — Medication 4 MILLIGRAM(S): at 00:09

## 2022-05-05 RX ADMIN — Medication 4 MILLIGRAM(S): at 03:09

## 2022-05-05 RX ADMIN — SODIUM CHLORIDE 125 MILLILITER(S): 9 INJECTION INTRAMUSCULAR; INTRAVENOUS; SUBCUTANEOUS at 21:34

## 2022-05-05 RX ADMIN — Medication 4 MILLIGRAM(S): at 17:15

## 2022-05-05 RX ADMIN — Medication 1 GRAM(S): at 11:29

## 2022-05-05 RX ADMIN — Medication 40 MILLIEQUIVALENT(S): at 09:05

## 2022-05-05 RX ADMIN — SIMVASTATIN 20 MILLIGRAM(S): 20 TABLET, FILM COATED ORAL at 21:34

## 2022-05-05 NOTE — PROGRESS NOTE ADULT - PROBLEM SELECTOR PLAN 1
present with Nausea, coffee ground emesis, epigastric pain and black stool  Hb stable, FOBT +  IV pantoprazole 40mg bid  Continue sucralfate 1g qid  Monitor H/H  IV fluid  advance diet to clears   GI consulted- Dr Sy    Suspect aspect of chronic alcoholism, gastritis

## 2022-05-05 NOTE — PROGRESS NOTE ADULT - PROBLEM SELECTOR PLAN 3
ETOH use with h/o withdrawal and DTs  Reportedly last use was 2 days ago but serum alcohol is 181  Received phenobarbital IV 260mg, IV thiamine  Continue thiamine, folate and multivitamin  Monitor for alcohol withdrawal    C/w [facility] protocol

## 2022-05-06 ENCOUNTER — TRANSCRIPTION ENCOUNTER (OUTPATIENT)
Age: 57
End: 2022-05-06

## 2022-05-06 LAB
ALBUMIN SERPL ELPH-MCNC: 3.2 G/DL — LOW (ref 3.3–5)
ALP SERPL-CCNC: 51 U/L — SIGNIFICANT CHANGE UP (ref 40–120)
ALT FLD-CCNC: 20 U/L — SIGNIFICANT CHANGE UP (ref 12–78)
ANION GAP SERPL CALC-SCNC: 7 MMOL/L — SIGNIFICANT CHANGE UP (ref 5–17)
AST SERPL-CCNC: 23 U/L — SIGNIFICANT CHANGE UP (ref 15–37)
BILIRUB SERPL-MCNC: 0.6 MG/DL — SIGNIFICANT CHANGE UP (ref 0.2–1.2)
BUN SERPL-MCNC: 8 MG/DL — SIGNIFICANT CHANGE UP (ref 7–23)
CALCIUM SERPL-MCNC: 8.8 MG/DL — SIGNIFICANT CHANGE UP (ref 8.5–10.1)
CHLORIDE SERPL-SCNC: 108 MMOL/L — SIGNIFICANT CHANGE UP (ref 96–108)
CO2 SERPL-SCNC: 25 MMOL/L — SIGNIFICANT CHANGE UP (ref 22–31)
CREAT SERPL-MCNC: 1.05 MG/DL — SIGNIFICANT CHANGE UP (ref 0.5–1.3)
EGFR: 84 ML/MIN/1.73M2 — SIGNIFICANT CHANGE UP
GLUCOSE SERPL-MCNC: 96 MG/DL — SIGNIFICANT CHANGE UP (ref 70–99)
HCT VFR BLD CALC: 34.3 % — LOW (ref 39–50)
HGB BLD-MCNC: 10.5 G/DL — LOW (ref 13–17)
MCHC RBC-ENTMCNC: 23.4 PG — LOW (ref 27–34)
MCHC RBC-ENTMCNC: 30.6 G/DL — LOW (ref 32–36)
MCV RBC AUTO: 76.4 FL — LOW (ref 80–100)
NRBC # BLD: 0 /100 WBCS — SIGNIFICANT CHANGE UP (ref 0–0)
PLATELET # BLD AUTO: 247 K/UL — SIGNIFICANT CHANGE UP (ref 150–400)
POTASSIUM SERPL-MCNC: 3.7 MMOL/L — SIGNIFICANT CHANGE UP (ref 3.5–5.3)
POTASSIUM SERPL-SCNC: 3.7 MMOL/L — SIGNIFICANT CHANGE UP (ref 3.5–5.3)
PROT SERPL-MCNC: 6.8 GM/DL — SIGNIFICANT CHANGE UP (ref 6–8.3)
RBC # BLD: 4.49 M/UL — SIGNIFICANT CHANGE UP (ref 4.2–5.8)
RBC # FLD: 21.2 % — HIGH (ref 10.3–14.5)
SODIUM SERPL-SCNC: 140 MMOL/L — SIGNIFICANT CHANGE UP (ref 135–145)
WBC # BLD: 3.66 K/UL — LOW (ref 3.8–10.5)
WBC # FLD AUTO: 3.66 K/UL — LOW (ref 3.8–10.5)

## 2022-05-06 PROCEDURE — 99239 HOSP IP/OBS DSCHRG MGMT >30: CPT

## 2022-05-06 RX ORDER — SUCRALFATE 1 G
1 TABLET ORAL
Qty: 56 | Refills: 0
Start: 2022-05-06 | End: 2022-05-19

## 2022-05-06 RX ORDER — PANTOPRAZOLE SODIUM 20 MG/1
1 TABLET, DELAYED RELEASE ORAL
Qty: 0 | Refills: 0 | DISCHARGE

## 2022-05-06 RX ORDER — PANTOPRAZOLE SODIUM 20 MG/1
1 TABLET, DELAYED RELEASE ORAL
Qty: 60 | Refills: 0
Start: 2022-05-06 | End: 2022-06-04

## 2022-05-06 RX ADMIN — Medication 1 GRAM(S): at 13:06

## 2022-05-06 RX ADMIN — Medication 3 MILLIGRAM(S): at 19:04

## 2022-05-06 RX ADMIN — PANTOPRAZOLE SODIUM 40 MILLIGRAM(S): 20 TABLET, DELAYED RELEASE ORAL at 18:52

## 2022-05-06 RX ADMIN — OXYCODONE HYDROCHLORIDE 5 MILLIGRAM(S): 5 TABLET ORAL at 04:25

## 2022-05-06 RX ADMIN — Medication 1 MILLIGRAM(S): at 13:05

## 2022-05-06 RX ADMIN — SIMVASTATIN 20 MILLIGRAM(S): 20 TABLET, FILM COATED ORAL at 21:36

## 2022-05-06 RX ADMIN — Medication 3 MILLIGRAM(S): at 16:15

## 2022-05-06 RX ADMIN — Medication 100 MILLIGRAM(S): at 13:06

## 2022-05-06 RX ADMIN — Medication 3 MILLIGRAM(S): at 02:20

## 2022-05-06 RX ADMIN — OXYCODONE HYDROCHLORIDE 5 MILLIGRAM(S): 5 TABLET ORAL at 06:03

## 2022-05-06 RX ADMIN — Medication 2 MILLIGRAM(S): at 21:36

## 2022-05-06 RX ADMIN — Medication 3 MILLIGRAM(S): at 06:14

## 2022-05-06 RX ADMIN — Medication 3 MILLIGRAM(S): at 21:36

## 2022-05-06 RX ADMIN — Medication 3 MILLIGRAM(S): at 11:12

## 2022-05-06 RX ADMIN — Medication 1 GRAM(S): at 18:52

## 2022-05-06 RX ADMIN — Medication 1 GRAM(S): at 01:49

## 2022-05-06 RX ADMIN — Medication 1 GRAM(S): at 06:14

## 2022-05-06 RX ADMIN — Medication 1 TABLET(S): at 13:06

## 2022-05-06 RX ADMIN — SODIUM CHLORIDE 125 MILLILITER(S): 9 INJECTION INTRAMUSCULAR; INTRAVENOUS; SUBCUTANEOUS at 19:02

## 2022-05-06 RX ADMIN — SODIUM CHLORIDE 125 MILLILITER(S): 9 INJECTION INTRAMUSCULAR; INTRAVENOUS; SUBCUTANEOUS at 06:14

## 2022-05-06 RX ADMIN — PANTOPRAZOLE SODIUM 40 MILLIGRAM(S): 20 TABLET, DELAYED RELEASE ORAL at 06:13

## 2022-05-06 NOTE — DISCHARGE NOTE PROVIDER - NSDCMRMEDTOKEN_GEN_ALL_CORE_FT
folic acid 1 mg oral tablet: 1 tab(s) orally once a day  pantoprazole 40 mg oral delayed release tablet: 1 tab(s) orally 2 times a day  simvastatin 20 mg oral tablet: 1 tab(s) orally once a day (at bedtime)  sucralfate 1 g oral tablet: 1 tab(s) orally 4 times a day  thiamine 100 mg oral tablet: 1 tab(s) orally once a day  Zofran 4 mg oral tablet: 1 tab(s) orally every 8 hours, As Needed -for nausea

## 2022-05-06 NOTE — PATIENT PROFILE ADULT - FALL HARM RISK - RISK INTERVENTIONS
Assistance OOB with selected safe patient handling equipment/Assistance with ambulation/Communicate Fall Risk and Risk Factors to all staff, patient, and family/Discuss with provider need for PT consult/Monitor for mental status changes/Monitor gait and stability/Provide patient with walking aids - walker, cane, crutches/Reinforce activity limits and safety measures with patient and family/Toileting schedule using arm’s reach rule for commode and bathroom/Use of alarms - bed, chair and/or voice tab/Visual Cue: Yellow wristband/Bed in lowest position, wheels locked, appropriate side rails in place/Call bell, personal items and telephone in reach/Instruct patient to call for assistance before getting out of bed or chair/Non-slip footwear when patient is out of bed/[place] to call system/Physically safe environment - no spills, clutter or unnecessary equipment/Purposeful Proactive Rounding/Room/bathroom lighting operational, light cord in reach

## 2022-05-06 NOTE — DISCHARGE NOTE PROVIDER - HOSPITAL COURSE
57 years old male with h/o HTN, HLD, ETOH use with h/o withdrawal and DTs present to ED with complain of coffee ground emesis and abdominal pain. Patient has been having nausea vomiting for last 8 days. He noted coffee ground emesis for last 4-5 days and black stool for last 2 days. Reported severe sharp epigastric pain, constant, 9/10 pain radiate to the back  Hemodynamically stable, afebrile, sat well at RA. No leukocytosis, Hb 11.9, Plt 386, Na 134, K 3.2, Cr 1.13, lipase 240, alcohol 181, FOBT +. Received 1L LR, IV morhpine, IV thiamine, Zofran, phenobarbital IV 260mg, IV potassium 10mEq x3.       Admitted with coffee ground emesis    Problem/Plan - 1:  ·  Problem: Upper GI bleed.   ·  Plan: present with Nausea, coffee ground emesis, epigastric pain and black stool  Hb stable, FOBT +  continue protonix   Continue sucralfate 1g qid  tolerating diet, hgb stable no further bleeding   GI consulted- Dr Sy    Suspect aspect of chronic alcoholism, gastritis.    Problem/Plan - 2:  ·  Problem: Hypokalemia.   ·  Plan: replaced     Problem/Plan - 3:  ·  Problem: Alcohol use disorder, severe, dependence.   ·  Plan: ciwa low, pt out of withdrawal          Problem/Plan - 4:  ·  Problem: Benign essential HTN.   ·  Plan: Monitor BP and titrate BP meds.    Problem/Plan - 5:  ·  Problem: Hyperlipidemia, unspecified.   ·  Plan: continue simvastatin 20mg hs.          pt seen and examined 45 min spent on dc planning     Lab test review, Radiology Review, Vitals review, Consultant review and discussion, Physical examination, IDR, Assessment and plan; Plan discussion with patient and family

## 2022-05-06 NOTE — PROGRESS NOTE ADULT - SUBJECTIVE AND OBJECTIVE BOX
Pt stable - feeling better  +ETOH abuse  H/H stable - no bleeding      MEDICATIONS  (STANDING):  dextrose 50% Injectable 25 Gram(s) IV Push once  dextrose 50% Injectable 12.5 Gram(s) IV Push once  dextrose 50% Injectable 25 Gram(s) IV Push once  dextrose Oral Gel 15 Gram(s) Oral once  folic acid 1 milliGRAM(s) Oral daily  glucagon  Injectable 1 milliGRAM(s) IntraMuscular once  LORazepam   Injectable 3 milliGRAM(s) IV Push every 4 hours  LORazepam   Injectable 2 milliGRAM(s) IV Push every 4 hours  LORazepam   Injectable   IV Push   multivitamin 1 Tablet(s) Oral daily  pantoprazole  Injectable 40 milliGRAM(s) IV Push two times a day  simvastatin 20 milliGRAM(s) Oral at bedtime  sodium chloride 0.9%. 1000 milliLiter(s) (125 mL/Hr) IV Continuous <Continuous>  sucralfate 1 Gram(s) Oral four times a day  thiamine 100 milliGRAM(s) Oral daily    MEDICATIONS  (PRN):  acetaminophen     Tablet .. 650 milliGRAM(s) Oral every 6 hours PRN Mild Pain (1 - 3), Moderate Pain (4 - 6)  aluminum hydroxide/magnesium hydroxide/simethicone Suspension 30 milliLiter(s) Oral every 4 hours PRN Dyspepsia  melatonin 3 milliGRAM(s) Oral at bedtime PRN Insomnia  ondansetron Injectable 4 milliGRAM(s) IV Push every 6 hours PRN Nausea and/or Vomiting  oxyCODONE    IR 5 milliGRAM(s) Oral every 8 hours PRN Severe Pain (7 - 10)      Allergies    No Known Allergies    Intolerances        Vital Signs Last 24 Hrs  T(C): 36.7 (06 May 2022 05:19), Max: 36.7 (05 May 2022 12:07)  T(F): 98.1 (06 May 2022 05:19), Max: 98.1 (05 May 2022 17:30)  HR: 72 (06 May 2022 05:19) (72 - 82)  BP: 128/81 (06 May 2022 05:19) (116/81 - 147/80)  BP(mean): --  RR: 18 (06 May 2022 05:19) (16 - 18)  SpO2: 98% (06 May 2022 05:19) (96% - 98%)    PHYSICAL EXAM:  General: NAD.  CVS: S1, S2  Chest: air entry bilaterally present  Abd: BS present, soft, non-tender      LABS:                        10.5   3.66  )-----------( 247      ( 06 May 2022 07:46 )             34.3     05-06    140  |  108  |  8   ----------------------------<  96  3.7   |  25  |  1.05    Ca    8.8      06 May 2022 07:46  Phos  2.3     05-05  Mg     2.1     05-05    TPro  6.8  /  Alb  3.2<L>  /  TBili  0.6  /  DBili  x   /  AST  23  /  ALT  20  /  AlkPhos  51  05-06    PT/INR - ( 04 May 2022 13:48 )   PT: 11.8 sec;   INR: 0.99 ratio         PTT - ( 04 May 2022 13:48 )  PTT:30.3 sec    on alcohol detox protocol  continue protonix and carafate  labs stable  diet as tolerated      
No further bleeding  +/- nausea  +alcohol abuse      MEDICATIONS  (STANDING):  dextrose 50% Injectable 25 Gram(s) IV Push once  dextrose 50% Injectable 12.5 Gram(s) IV Push once  dextrose 50% Injectable 25 Gram(s) IV Push once  dextrose Oral Gel 15 Gram(s) Oral once  folic acid 1 milliGRAM(s) Oral daily  glucagon  Injectable 1 milliGRAM(s) IntraMuscular once  LORazepam   Injectable 4 milliGRAM(s) IV Push every 4 hours  LORazepam   Injectable 3 milliGRAM(s) IV Push every 4 hours  LORazepam   Injectable   IV Push   multivitamin 1 Tablet(s) Oral daily  pantoprazole  Injectable 40 milliGRAM(s) IV Push two times a day  simvastatin 20 milliGRAM(s) Oral at bedtime  sodium chloride 0.9%. 1000 milliLiter(s) (125 mL/Hr) IV Continuous <Continuous>  sucralfate 1 Gram(s) Oral four times a day  thiamine 100 milliGRAM(s) Oral daily    MEDICATIONS  (PRN):  acetaminophen     Tablet .. 650 milliGRAM(s) Oral every 6 hours PRN Mild Pain (1 - 3), Moderate Pain (4 - 6)  aluminum hydroxide/magnesium hydroxide/simethicone Suspension 30 milliLiter(s) Oral every 4 hours PRN Dyspepsia  melatonin 3 milliGRAM(s) Oral at bedtime PRN Insomnia  ondansetron Injectable 4 milliGRAM(s) IV Push every 6 hours PRN Nausea and/or Vomiting  oxyCODONE    IR 5 milliGRAM(s) Oral every 8 hours PRN Severe Pain (7 - 10)      Allergies    No Known Allergies    Intolerances        Vital Signs Last 24 Hrs  T(C): 36.7 (05 May 2022 12:07), Max: 37.1 (04 May 2022 15:41)  T(F): 98 (05 May 2022 12:07), Max: 98.7 (04 May 2022 15:41)  HR: 82 (05 May 2022 12:07) (74 - 88)  BP: 139/96 (05 May 2022 12:07) (114/69 - 143/80)  BP(mean): --  RR: 16 (05 May 2022 12:07) (16 - 18)  SpO2: 97% (05 May 2022 12:07) (95% - 98%)    PHYSICAL EXAM:  General: NAD.  CVS: S1, S2  Chest: air entry bilaterally present  Abd: BS present, soft, non-tender      LABS:                        10.7   4.55  )-----------( 261      ( 05 May 2022 07:34 )             34.7     05-05    138  |  103  |  8   ----------------------------<  100<H>  3.3<L>   |  28  |  1.13    Ca    8.4<L>      05 May 2022 07:34  Phos  2.3     05-05  Mg     2.1     05-05    TPro  6.9  /  Alb  3.2<L>  /  TBili  0.7  /  DBili  x   /  AST  22  /  ALT  23  /  AlkPhos  49  05-05    PT/INR - ( 04 May 2022 13:48 )   PT: 11.8 sec;   INR: 0.99 ratio         PTT - ( 04 May 2022 13:48 )  PTT:30.3 sec    continue protonix  follow labs  alcohol detox        
Patient is a 57y old  Male who presents with a chief complaint of coffee ground emesis (04 May 2022 17:40)    INTERVAL HPI/OVERNIGHT EVENTS: no events     MEDICATIONS  (STANDING):  dextrose 50% Injectable 25 Gram(s) IV Push once  dextrose 50% Injectable 12.5 Gram(s) IV Push once  dextrose 50% Injectable 25 Gram(s) IV Push once  dextrose Oral Gel 15 Gram(s) Oral once  folic acid 1 milliGRAM(s) Oral daily  glucagon  Injectable 1 milliGRAM(s) IntraMuscular once  LORazepam   Injectable 4 milliGRAM(s) IV Push every 4 hours  LORazepam   Injectable 3 milliGRAM(s) IV Push every 4 hours  LORazepam   Injectable   IV Push   multivitamin 1 Tablet(s) Oral daily  pantoprazole  Injectable 40 milliGRAM(s) IV Push two times a day  simvastatin 20 milliGRAM(s) Oral at bedtime  sodium chloride 0.9%. 1000 milliLiter(s) (125 mL/Hr) IV Continuous <Continuous>  sucralfate 1 Gram(s) Oral four times a day  thiamine 100 milliGRAM(s) Oral daily    MEDICATIONS  (PRN):  acetaminophen     Tablet .. 650 milliGRAM(s) Oral every 6 hours PRN Mild Pain (1 - 3), Moderate Pain (4 - 6)  aluminum hydroxide/magnesium hydroxide/simethicone Suspension 30 milliLiter(s) Oral every 4 hours PRN Dyspepsia  melatonin 3 milliGRAM(s) Oral at bedtime PRN Insomnia  ondansetron Injectable 4 milliGRAM(s) IV Push every 6 hours PRN Nausea and/or Vomiting  oxyCODONE    IR 5 milliGRAM(s) Oral every 8 hours PRN Severe Pain (7 - 10)    Allergies    No Known Allergies    Intolerances      REVIEW OF SYSTEMS:  All other systems reviewed and are negative    Vital Signs Last 24 Hrs  T(C): 36.7 (05 May 2022 12:07), Max: 37.1 (04 May 2022 15:41)  T(F): 98 (05 May 2022 12:07), Max: 98.7 (04 May 2022 15:41)  HR: 82 (05 May 2022 12:07) (74 - 88)  BP: 139/96 (05 May 2022 12:07) (114/69 - 143/80)  BP(mean): --  RR: 16 (05 May 2022 12:07) (16 - 18)  SpO2: 97% (05 May 2022 12:07) (95% - 98%)  Daily     Daily   I&O's Summary    04 May 2022 07:01  -  05 May 2022 07:00  --------------------------------------------------------  IN: 1170 mL / OUT: 325 mL / NET: 845 mL      CAPILLARY BLOOD GLUCOSE      POCT Blood Glucose.: 113 mg/dL (05 May 2022 11:21)  POCT Blood Glucose.: 109 mg/dL (05 May 2022 07:58)  POCT Blood Glucose.: 98 mg/dL (05 May 2022 06:50)  POCT Blood Glucose.: 104 mg/dL (05 May 2022 00:08)    PHYSICAL EXAM:  GENERAL: NAD,    HEAD:  Atraumatic, Normocephalic  EYES: EOMI, PERRLA, conjunctiva and sclera clear  ENMT: No tonsillar erythema, exudates, or enlargement; Moist mucous membranes, Good dentition, No lesions  NECK: Supple, No JVD, Normal thyroid  NERVOUS SYSTEM:  Alert & Oriented X3, Good concentration; Motor Strength 5/5 B/L upper and lower extremities; DTRs 2+ intact and symmetric  CHEST/LUNG: Clear to percussion bilaterally; No rales, rhonchi, wheezing, or rubs  HEART: Regular rate and rhythm; No murmurs, rubs, or gallops  ABDOMEN: Soft, Nontender, Nondistended; Bowel sounds present  EXTREMITIES:  2+ Peripheral Pulses, No clubbing, cyanosis, or edema  LYMPH: No lymphadenopathy noted  SKIN: No rashes or lesions    Labs                          10.7   4.55  )-----------( 261      ( 05 May 2022 07:34 )             34.7     05-05    138  |  103  |  8   ----------------------------<  100<H>  3.3<L>   |  28  |  1.13    Ca    8.4<L>      05 May 2022 07:34  Phos  2.3     05-05  Mg     2.1     05-05    TPro  6.9  /  Alb  3.2<L>  /  TBili  0.7  /  DBili  x   /  AST  22  /  ALT  23  /  AlkPhos  49  05-05    PT/INR - ( 04 May 2022 13:48 )   PT: 11.8 sec;   INR: 0.99 ratio         PTT - ( 04 May 2022 13:48 )  PTT:30.3 sec                    DVT prophylaxis: > Lovenox 40mg SQ daily  > Heparin   > SCD's

## 2022-05-06 NOTE — DISCHARGE NOTE PROVIDER - NSDCCPCAREPLAN_GEN_ALL_CORE_FT
PRINCIPAL DISCHARGE DIAGNOSIS  Diagnosis: Severe alcohol use disorder  Assessment and Plan of Treatment: continue prescribed medications  Stop drinking , it is giving you the abdominal pain  follow up with gi      SECONDARY DISCHARGE DIAGNOSES  Diagnosis: Melena  Assessment and Plan of Treatment:

## 2022-05-07 ENCOUNTER — TRANSCRIPTION ENCOUNTER (OUTPATIENT)
Age: 57
End: 2022-05-07

## 2022-05-07 VITALS
RESPIRATION RATE: 18 BRPM | OXYGEN SATURATION: 98 % | HEART RATE: 94 BPM | TEMPERATURE: 98 F | SYSTOLIC BLOOD PRESSURE: 137 MMHG | DIASTOLIC BLOOD PRESSURE: 91 MMHG

## 2022-05-07 RX ADMIN — Medication 2 MILLIGRAM(S): at 06:17

## 2022-05-07 RX ADMIN — Medication 1 GRAM(S): at 06:15

## 2022-05-07 RX ADMIN — Medication 2 MILLIGRAM(S): at 01:33

## 2022-05-07 RX ADMIN — Medication 1 GRAM(S): at 00:59

## 2022-05-07 RX ADMIN — PANTOPRAZOLE SODIUM 40 MILLIGRAM(S): 20 TABLET, DELAYED RELEASE ORAL at 06:16

## 2022-05-07 NOTE — DISCHARGE NOTE NURSING/CASE MANAGEMENT/SOCIAL WORK - NSDCVIVACCINE_GEN_ALL_CORE_FT
COVID-19, mRNA, LNP-S, PF, 100 mcg/ 0.5 mL dose (Moderna); 10-Jovan-2021 15:38; Reji Hernandez (RN); [employer] [id number] (Exp. Date: 13-Jul-2021); IntraMuscular; Deltoid Right.; 0.5 milliLiter(s);   influenza, injectable, quadrivalent, preservative free; 31-Jan-2019 15:53; Ayaka Bynum (RN); [employer]; [id number] (Exp. Date: 30-Jun-2019); IntraMuscular; Deltoid Right.; 0.5 milliLiter(s); VIS (VIS Published: 07-Aug-2015, VIS Presented: 31-Jan-2019);   influenza, injectable, quadrivalent, preservative free; 10-Nov-2019 14:13; Laverne Lane (RN); [employer]; [id number] (Exp. Date: 30-Jun-2020); IntraMuscular; Deltoid Left.; 0.5 milliLiter(s); VIS (VIS Published: 15-Aug-2019, VIS Presented: 10-Nov-2019);   influenza, injectable, quadrivalent, preservative free; 13-Oct-2020 11:56; Kimberli Cronin (RN); [employer]; [id number] (Exp. Date: 30-Jun-2021); IntraMuscular; Deltoid Right.; 0.5 milliLiter(s); VIS (VIS Published: 15-Aug-2019, VIS Presented: 13-Oct-2020);   Td (adult) preservative free; 18-Jan-2020 20:04; Yulia Vance (RN); [place]; [id number] (Exp. Date: 19-Jan-2021); IntraMuscular; Deltoid Right.; 0.5 milliLiter(s); VIS (VIS Published: 18-Jan-2020, VIS Presented: 18-Jan-2020);

## 2022-05-07 NOTE — DISCHARGE NOTE NURSING/CASE MANAGEMENT/SOCIAL WORK - PATIENT PORTAL LINK FT
You can access the FollowMyHealth Patient Portal offered by [facility] by registering at the following website: [email or web address]. By joining [facility]’s FollowMyHealth portal, you will also be able to view your health information using other applications (apps) compatible with our system.

## 2022-05-07 NOTE — DISCHARGE NOTE NURSING/CASE MANAGEMENT/SOCIAL WORK - NSDCPEFALRISK_GEN_ALL_CORE
For information on Fall & Injury Prevention, visit: [email or web address] OR  [email or web address] OR  https://www.cdc.gov/steadi/patient.html

## 2022-05-09 NOTE — ED PROVIDER NOTE - TEMPLATE, MLM
VIEW ALL Opioid Pregnancy And Lactation Text: These medications can lead to premature delivery and should be avoided during pregnancy. These medications are also present in breast milk in small amounts.

## 2022-05-12 DIAGNOSIS — F10.229 ALCOHOL DEPENDENCE WITH INTOXICATION, UNSPECIFIED: ICD-10-CM

## 2022-05-12 DIAGNOSIS — K92.0 HEMATEMESIS: ICD-10-CM

## 2022-05-12 DIAGNOSIS — E78.5 HYPERLIPIDEMIA, UNSPECIFIED: ICD-10-CM

## 2022-05-12 DIAGNOSIS — K92.1 MELENA: ICD-10-CM

## 2022-05-12 DIAGNOSIS — I10 ESSENTIAL (PRIMARY) HYPERTENSION: ICD-10-CM

## 2022-05-12 DIAGNOSIS — E87.6 HYPOKALEMIA: ICD-10-CM
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
## 2022-05-12 DIAGNOSIS — K27.9 PEPTIC ULCER, SITE UNSPECIFIED, UNSPECIFIED AS ACUTE OR CHRONIC, WITHOUT HEMORRHAGE OR PERFORATION: ICD-10-CM

## 2022-05-28 NOTE — ED ADULT NURSE NOTE - ED CARDIAC CAPILLARY REFILL
Neuro: PERRL, opens eyes to to voice, withdraws with BLE and RUE, purposeful and follows commands with LUE, propofol infusing     CV: tele SR, levo infusing to maintain MAP > 65     Resp: LS clear, vent settings unchanged     GI: BS active, no BM this shift, TF infusing via PEG tube at goal rate     : aaron with adequate urine output      Skin: coccyx with blanchable erythema - mepilex CDI     Additional: afebrile, K+ 2.1 - replacement infusing, mom updated via telephone in evening   2 seconds or less

## 2022-06-15 NOTE — ED ADULT NURSE NOTE - NSSEPSISSUSPECTED_ED_A_ED
Recent potassium level still normal without potassium supplementation.  She is currently on enalapril-hydrochlorothiazide for blood pressure control.  Plan to recheck potassium level before upcoming 8/18/2022 appointment with me.  
No

## 2022-06-16 ENCOUNTER — EMERGENCY (EMERGENCY)
Facility: HOSPITAL | Age: 57
LOS: 1 days | Discharge: HOME | End: 2022-06-16
Attending: EMERGENCY MEDICINE
Payer: MEDICAID

## 2022-06-16 VITALS
RESPIRATION RATE: 17 BRPM | TEMPERATURE: 99 F | DIASTOLIC BLOOD PRESSURE: 92 MMHG | SYSTOLIC BLOOD PRESSURE: 133 MMHG | OXYGEN SATURATION: 99 % | HEART RATE: 94 BPM

## 2022-06-16 VITALS
HEIGHT: 67 IN | SYSTOLIC BLOOD PRESSURE: 127 MMHG | OXYGEN SATURATION: 97 % | TEMPERATURE: 98 F | RESPIRATION RATE: 17 BRPM | WEIGHT: 149.91 LBS | DIASTOLIC BLOOD PRESSURE: 80 MMHG | HEART RATE: 114 BPM

## 2022-06-16 DIAGNOSIS — R11.2 NAUSEA WITH VOMITING, UNSPECIFIED: ICD-10-CM

## 2022-06-16 DIAGNOSIS — E78.5 HYPERLIPIDEMIA, UNSPECIFIED: ICD-10-CM

## 2022-06-16 DIAGNOSIS — R10.9 UNSPECIFIED ABDOMINAL PAIN: ICD-10-CM

## 2022-06-16 DIAGNOSIS — Z87.19 PERSONAL HISTORY OF OTHER DISEASES OF THE DIGESTIVE SYSTEM: ICD-10-CM

## 2022-06-16 DIAGNOSIS — K29.20 ALCOHOLIC GASTRITIS WITHOUT BLEEDING: ICD-10-CM

## 2022-06-16 LAB
ALBUMIN SERPL ELPH-MCNC: 4.3 G/DL — SIGNIFICANT CHANGE UP (ref 3.3–5)
ALP SERPL-CCNC: 54 U/L — SIGNIFICANT CHANGE UP (ref 40–120)
ALT FLD-CCNC: 37 U/L — SIGNIFICANT CHANGE UP (ref 12–78)
AMYLASE P1 CFR SERPL: 110 U/L — SIGNIFICANT CHANGE UP (ref 25–115)
ANION GAP SERPL CALC-SCNC: 18 MMOL/L — HIGH (ref 5–17)
AST SERPL-CCNC: 43 U/L — HIGH (ref 15–37)
BASOPHILS # BLD AUTO: 0.07 K/UL — SIGNIFICANT CHANGE UP (ref 0–0.2)
BASOPHILS NFR BLD AUTO: 0.8 % — SIGNIFICANT CHANGE UP (ref 0–2)
BILIRUB SERPL-MCNC: 0.5 MG/DL — SIGNIFICANT CHANGE UP (ref 0.2–1.2)
BUN SERPL-MCNC: 18 MG/DL — SIGNIFICANT CHANGE UP (ref 7–23)
CALCIUM SERPL-MCNC: 9.7 MG/DL — SIGNIFICANT CHANGE UP (ref 8.5–10.1)
CHLORIDE SERPL-SCNC: 101 MMOL/L — SIGNIFICANT CHANGE UP (ref 96–108)
CO2 SERPL-SCNC: 21 MMOL/L — LOW (ref 22–31)
CREAT SERPL-MCNC: 1.35 MG/DL — HIGH (ref 0.5–1.3)
EGFR: 62 ML/MIN/1.73M2 — SIGNIFICANT CHANGE UP
EOSINOPHIL # BLD AUTO: 0.08 K/UL — SIGNIFICANT CHANGE UP (ref 0–0.5)
EOSINOPHIL NFR BLD AUTO: 0.9 % — SIGNIFICANT CHANGE UP (ref 0–6)
ETHANOL SERPL-MCNC: 39 MG/DL — HIGH (ref 0–10)
GLUCOSE SERPL-MCNC: 101 MG/DL — HIGH (ref 70–99)
HCT VFR BLD CALC: 42 % — SIGNIFICANT CHANGE UP (ref 39–50)
HGB BLD-MCNC: 13.5 G/DL — SIGNIFICANT CHANGE UP (ref 13–17)
IMM GRANULOCYTES NFR BLD AUTO: 0.4 % — SIGNIFICANT CHANGE UP (ref 0–1.5)
LG PLATELETS BLD QL AUTO: SLIGHT — SIGNIFICANT CHANGE UP
LIDOCAIN IGE QN: 139 U/L — SIGNIFICANT CHANGE UP (ref 73–393)
LYMPHOCYTES # BLD AUTO: 1.57 K/UL — SIGNIFICANT CHANGE UP (ref 1–3.3)
LYMPHOCYTES # BLD AUTO: 16.8 % — SIGNIFICANT CHANGE UP (ref 13–44)
MACROCYTES BLD QL: SLIGHT — SIGNIFICANT CHANGE UP
MAGNESIUM SERPL-MCNC: 2.2 MG/DL — SIGNIFICANT CHANGE UP (ref 1.6–2.6)
MANUAL SMEAR VERIFICATION: SIGNIFICANT CHANGE UP
MCHC RBC-ENTMCNC: 23.3 PG — LOW (ref 27–34)
MCHC RBC-ENTMCNC: 32.1 G/DL — SIGNIFICANT CHANGE UP (ref 32–36)
MCV RBC AUTO: 72.5 FL — LOW (ref 80–100)
MONOCYTES # BLD AUTO: 0.44 K/UL — SIGNIFICANT CHANGE UP (ref 0–0.9)
MONOCYTES NFR BLD AUTO: 4.7 % — SIGNIFICANT CHANGE UP (ref 2–14)
NEUTROPHILS # BLD AUTO: 7.12 K/UL — SIGNIFICANT CHANGE UP (ref 1.8–7.4)
NEUTROPHILS NFR BLD AUTO: 76.4 % — SIGNIFICANT CHANGE UP (ref 43–77)
NRBC # BLD: 0 /100 WBCS — SIGNIFICANT CHANGE UP (ref 0–0)
PLAT MORPH BLD: NORMAL — SIGNIFICANT CHANGE UP
PLATELET # BLD AUTO: 336 K/UL — SIGNIFICANT CHANGE UP (ref 150–400)
POTASSIUM SERPL-MCNC: 4.1 MMOL/L — SIGNIFICANT CHANGE UP (ref 3.5–5.3)
POTASSIUM SERPL-SCNC: 4.1 MMOL/L — SIGNIFICANT CHANGE UP (ref 3.5–5.3)
PROT SERPL-MCNC: 9 GM/DL — HIGH (ref 6–8.3)
RBC # BLD: 5.79 M/UL — SIGNIFICANT CHANGE UP (ref 4.2–5.8)
RBC # FLD: 20.7 % — HIGH (ref 10.3–14.5)
RBC BLD AUTO: NORMAL — SIGNIFICANT CHANGE UP
SODIUM SERPL-SCNC: 140 MMOL/L — SIGNIFICANT CHANGE UP (ref 135–145)
TROPONIN I, HIGH SENSITIVITY RESULT: 5.8 NG/L — SIGNIFICANT CHANGE UP
WBC # BLD: 9.32 K/UL — SIGNIFICANT CHANGE UP (ref 3.8–10.5)
WBC # FLD AUTO: 9.32 K/UL — SIGNIFICANT CHANGE UP (ref 3.8–10.5)

## 2022-06-16 PROCEDURE — 74176 CT ABD & PELVIS W/O CONTRAST: CPT | Mod: 26,MA

## 2022-06-16 PROCEDURE — 99285 EMERGENCY DEPT VISIT HI MDM: CPT

## 2022-06-16 PROCEDURE — 71045 X-RAY EXAM CHEST 1 VIEW: CPT | Mod: 26

## 2022-06-16 RX ORDER — ONDANSETRON 8 MG/1
4 TABLET, FILM COATED ORAL ONCE
Refills: 0 | Status: COMPLETED | OUTPATIENT
Start: 2022-06-16 | End: 2022-06-16

## 2022-06-16 RX ORDER — MORPHINE SULFATE 50 MG/1
4 CAPSULE, EXTENDED RELEASE ORAL ONCE
Refills: 0 | Status: DISCONTINUED | OUTPATIENT
Start: 2022-06-16 | End: 2022-06-16

## 2022-06-16 RX ORDER — PANTOPRAZOLE SODIUM 20 MG/1
1 TABLET, DELAYED RELEASE ORAL
Qty: 30 | Refills: 0
Start: 2022-06-16 | End: 2022-07-15

## 2022-06-16 RX ORDER — HYDROMORPHONE HYDROCHLORIDE 2 MG/ML
1 INJECTION INTRAMUSCULAR; INTRAVENOUS; SUBCUTANEOUS ONCE
Refills: 0 | Status: DISCONTINUED | OUTPATIENT
Start: 2022-06-16 | End: 2022-06-16

## 2022-06-16 RX ORDER — SODIUM CHLORIDE 9 MG/ML
1000 INJECTION INTRAMUSCULAR; INTRAVENOUS; SUBCUTANEOUS ONCE
Refills: 0 | Status: COMPLETED | OUTPATIENT
Start: 2022-06-16 | End: 2022-06-16

## 2022-06-16 RX ORDER — ONDANSETRON 8 MG/1
1 TABLET, FILM COATED ORAL
Qty: 9 | Refills: 0
Start: 2022-06-16 | End: 2022-06-18

## 2022-06-16 RX ORDER — PANTOPRAZOLE SODIUM 20 MG/1
40 TABLET, DELAYED RELEASE ORAL ONCE
Refills: 0 | Status: COMPLETED | OUTPATIENT
Start: 2022-06-16 | End: 2022-06-16

## 2022-06-16 RX ADMIN — PANTOPRAZOLE SODIUM 40 MILLIGRAM(S): 20 TABLET, DELAYED RELEASE ORAL at 03:23

## 2022-06-16 RX ADMIN — MORPHINE SULFATE 4 MILLIGRAM(S): 50 CAPSULE, EXTENDED RELEASE ORAL at 03:22

## 2022-06-16 RX ADMIN — HYDROMORPHONE HYDROCHLORIDE 1 MILLIGRAM(S): 2 INJECTION INTRAMUSCULAR; INTRAVENOUS; SUBCUTANEOUS at 06:14

## 2022-06-16 RX ADMIN — ONDANSETRON 4 MILLIGRAM(S): 8 TABLET, FILM COATED ORAL at 03:16

## 2022-06-16 RX ADMIN — ONDANSETRON 4 MILLIGRAM(S): 8 TABLET, FILM COATED ORAL at 10:48

## 2022-06-16 RX ADMIN — SODIUM CHLORIDE 1000 MILLILITER(S): 9 INJECTION INTRAMUSCULAR; INTRAVENOUS; SUBCUTANEOUS at 03:16

## 2022-06-16 RX ADMIN — MORPHINE SULFATE 4 MILLIGRAM(S): 50 CAPSULE, EXTENDED RELEASE ORAL at 03:52

## 2022-06-16 RX ADMIN — HYDROMORPHONE HYDROCHLORIDE 1 MILLIGRAM(S): 2 INJECTION INTRAMUSCULAR; INTRAVENOUS; SUBCUTANEOUS at 04:57

## 2022-06-16 RX ADMIN — Medication 2 MILLIGRAM(S): at 02:45

## 2022-06-16 NOTE — ED PROVIDER NOTE - CLINICAL SUMMARY MEDICAL DECISION MAKING FREE TEXT BOX
Patient with abdominal pains, vomiting, well known to this ER for frequent alcohol related illness.  VSS.  Patient currently resting, labs, CT imaging reviewed.  Pending sobriety and improvement of symptoms, would reassess in a few hours to consider discharge vs admission.  CIWA 0.  Patient signed out to incoming physician Dr. Mendez.  All decisions regarding the progression of care will be made at their discretion. Patient with abdominal pains, vomiting, well known to this ER for frequent alcohol related illness.  VSS.  Patient currently resting, labs, CT imaging reviewed.  CXR with slight mediastinal widening, clinically no concern for dissection.  Pending sobriety and improvement of symptoms, would reassess in a few hours to consider discharge vs admission.  CIWA 0.  Patient signed out to incoming physician Dr. Mendez.  All decisions regarding the progression of care will be made at their discretion.

## 2022-06-16 NOTE — ED ADULT NURSE REASSESSMENT NOTE - NS ED NURSE REASSESS COMMENT FT1
0730- Pt received AAOx4, came to ED for abdominal pain. Pt was also vomiting. Pt expressed feeling better. No more vomiting. Pt verbalized that his pain was feeling so bad earlier that he felt like he was dying. Pt denies any suicidal ideation or intent during RN assessment. No s/s acute distress, will cont' to monitor.

## 2022-06-16 NOTE — ED PROVIDER NOTE - PHYSICAL EXAMINATION
Gen: Alert, distressed, gagging and dry heaving  Head: NC, AT, EOMI, normal lids/conjunctiva  ENT: normal hearing, patent oropharynx without erythema/exudate, uvula midline  Neck: +supple, no tenderness, +Trachea midline  Pulm: Bilateral BS, normal resp effort, no wheeze/stridor/retractions  CV: RRR, no M/R/G, +dist pulses  Abd: soft, +generalized abdominal pain, ND, Negative [place] signs, +BS, no palpable masses  Mskel: no edema/erythema/cyanosis  Skin: no rash, warm/dry  Neuro: AAOx3, no apparent sensory/motor deficits, coordination intact

## 2022-06-16 NOTE — ED ADULT NURSE NOTE - NSICDXPASTMEDICALHX_GEN_ALL_CORE_FT
negative... PAST MEDICAL HISTORY:  DTs (delirium tremens)     EtOH dependence     Gastritis     Mixed hyperlipidemia     PUD (peptic ulcer disease)     Substance abuse

## 2022-06-16 NOTE — ED ADULT NURSE NOTE - ED STAT RN HANDOFF DETAILS
Report received from [facility] Safety checks compld this shift/Safety rounds completed hourly.  IV sites checked Q2+remains WDL. Meds given as ord with no s/s of adverse RXNs. Fall +skin precs in place. on cardiac monitor, denies pain, no acute distress noted.

## 2022-06-16 NOTE — ED PROVIDER NOTE - PATIENT PORTAL LINK FT
You can access the FollowMyHealth Patient Portal offered by [facility] by registering at the following website: [email or web address]. By joining [employer]’s FollowMyHealth portal, you will also be able to view your health information using other applications (apps) compatible with our system.

## 2022-06-16 NOTE — ED ADULT NURSE NOTE - OBJECTIVE STATEMENT
Patient was received in ED c/o abdominal pain with nausea , Patient has a past medical history of pancreatitis and has been drinking .

## 2022-06-16 NOTE — ED PROVIDER NOTE - PROGRESS NOTE DETAILS
Pt was seen and treated by Dr. Capone pt is alert and oriented x 3 feeling nauseous but tolerated water orally abd is soft nontender to palp x 4. Pt is ambulating with normal gaits without assists. CIWA score is 3.

## 2022-06-16 NOTE — ED PROVIDER NOTE - OBJECTIVE STATEMENT
Pertinent PMH/PSH/FHx/SHx and Review of Systems contained within:  Patient presents to the ED for abdominal pain.  Patient well known to ER for alcohol induced symptoms, comes in today screaming of abdominal pain, nausea, denies drinking although per EMS and wife patient was drinking.  He endorses generalized pain, has been having nonbloody vomiting, and keeps stating he wants to die.

## 2022-06-29 NOTE — PATIENT PROFILE ADULT - FUNCTIONAL SCREEN CURRENT LEVEL: COMMUNICATION, MLM
PCO symptoms reviewed: blur, glare, difficulty with reading, driving, ADL. 0 = understands/communicates without difficulty

## 2022-07-12 ENCOUNTER — EMERGENCY (EMERGENCY)
Facility: HOSPITAL | Age: 57
LOS: 0 days | Discharge: HOME | End: 2022-07-12
Attending: STUDENT IN AN ORGANIZED HEALTH CARE EDUCATION/TRAINING PROGRAM

## 2022-07-12 VITALS
TEMPERATURE: 98 F | WEIGHT: 188.94 LBS | HEART RATE: 110 BPM | DIASTOLIC BLOOD PRESSURE: 100 MMHG | HEIGHT: 67 IN | RESPIRATION RATE: 20 BRPM | SYSTOLIC BLOOD PRESSURE: 140 MMHG | OXYGEN SATURATION: 97 %

## 2022-07-12 VITALS
DIASTOLIC BLOOD PRESSURE: 87 MMHG | TEMPERATURE: 98 F | HEART RATE: 95 BPM | SYSTOLIC BLOOD PRESSURE: 144 MMHG | OXYGEN SATURATION: 98 % | RESPIRATION RATE: 16 BRPM

## 2022-07-12 DIAGNOSIS — F10.231 ALCOHOL DEPENDENCE WITH WITHDRAWAL DELIRIUM: ICD-10-CM

## 2022-07-12 DIAGNOSIS — R10.10 UPPER ABDOMINAL PAIN, UNSPECIFIED: ICD-10-CM

## 2022-07-12 DIAGNOSIS — K27.9 PEPTIC ULCER, SITE UNSPECIFIED, UNSPECIFIED AS ACUTE OR CHRONIC, WITHOUT HEMORRHAGE OR PERFORATION: ICD-10-CM

## 2022-07-12 DIAGNOSIS — R10.13 EPIGASTRIC PAIN: ICD-10-CM

## 2022-07-12 DIAGNOSIS — R11.10 VOMITING, UNSPECIFIED: ICD-10-CM

## 2022-07-12 DIAGNOSIS — E78.5 HYPERLIPIDEMIA, UNSPECIFIED: ICD-10-CM

## 2022-07-12 DIAGNOSIS — I10 ESSENTIAL (PRIMARY) HYPERTENSION: ICD-10-CM

## 2022-07-12 DIAGNOSIS — Z20.822 CONTACT WITH AND (SUSPECTED) EXPOSURE TO COVID-19: ICD-10-CM

## 2022-07-12 LAB
ALBUMIN SERPL ELPH-MCNC: 3.7 G/DL — SIGNIFICANT CHANGE UP (ref 3.3–5)
ALP SERPL-CCNC: 54 U/L — SIGNIFICANT CHANGE UP (ref 40–120)
ALT FLD-CCNC: 28 U/L — SIGNIFICANT CHANGE UP (ref 12–78)
ANION GAP SERPL CALC-SCNC: 16 MMOL/L — SIGNIFICANT CHANGE UP (ref 5–17)
AST SERPL-CCNC: 32 U/L — SIGNIFICANT CHANGE UP (ref 15–37)
BASE EXCESS BLDV CALC-SCNC: -3 MMOL/L — LOW (ref -2–3)
BASOPHILS # BLD AUTO: 0.04 K/UL — SIGNIFICANT CHANGE UP (ref 0–0.2)
BASOPHILS NFR BLD AUTO: 0.8 % — SIGNIFICANT CHANGE UP (ref 0–2)
BILIRUB SERPL-MCNC: 0.4 MG/DL — SIGNIFICANT CHANGE UP (ref 0.2–1.2)
BLOOD GAS COMMENTS, VENOUS: SIGNIFICANT CHANGE UP
BUN SERPL-MCNC: 20 MG/DL — SIGNIFICANT CHANGE UP (ref 7–23)
CALCIUM SERPL-MCNC: 9.3 MG/DL — SIGNIFICANT CHANGE UP (ref 8.5–10.1)
CHLORIDE SERPL-SCNC: 102 MMOL/L — SIGNIFICANT CHANGE UP (ref 96–108)
CO2 BLDV-SCNC: 24 MMOL/L — SIGNIFICANT CHANGE UP (ref 22–26)
CO2 SERPL-SCNC: 22 MMOL/L — SIGNIFICANT CHANGE UP (ref 22–31)
CREAT SERPL-MCNC: 1.11 MG/DL — SIGNIFICANT CHANGE UP (ref 0.5–1.3)
EGFR: 78 ML/MIN/1.73M2 — SIGNIFICANT CHANGE UP
EOSINOPHIL # BLD AUTO: 0.09 K/UL — SIGNIFICANT CHANGE UP (ref 0–0.5)
EOSINOPHIL NFR BLD AUTO: 1.7 % — SIGNIFICANT CHANGE UP (ref 0–6)
ETHANOL SERPL-MCNC: 110 MG/DL — HIGH (ref 0–10)
FLUAV AG NPH QL: SIGNIFICANT CHANGE UP
FLUBV AG NPH QL: SIGNIFICANT CHANGE UP
GAS PNL BLDV: SIGNIFICANT CHANGE UP
GLUCOSE SERPL-MCNC: 93 MG/DL — SIGNIFICANT CHANGE UP (ref 70–99)
HCO3 BLDV-SCNC: 23 MMOL/L — SIGNIFICANT CHANGE UP (ref 22–28)
HCT VFR BLD CALC: 37.9 % — LOW (ref 39–50)
HGB BLD-MCNC: 11.9 G/DL — LOW (ref 13–17)
HOROWITZ INDEX BLDV+IHG-RTO: 21 — SIGNIFICANT CHANGE UP
IMM GRANULOCYTES NFR BLD AUTO: 0.4 % — SIGNIFICANT CHANGE UP (ref 0–1.5)
LACTATE SERPL-SCNC: 4.1 MMOL/L — CRITICAL HIGH (ref 0.7–2)
LACTATE SERPL-SCNC: 6.2 MMOL/L — CRITICAL HIGH (ref 0.7–2)
LIDOCAIN IGE QN: 138 U/L — SIGNIFICANT CHANGE UP (ref 73–393)
LYMPHOCYTES # BLD AUTO: 1.26 K/UL — SIGNIFICANT CHANGE UP (ref 1–3.3)
LYMPHOCYTES # BLD AUTO: 24.2 % — SIGNIFICANT CHANGE UP (ref 13–44)
MAGNESIUM SERPL-MCNC: 2 MG/DL — SIGNIFICANT CHANGE UP (ref 1.6–2.6)
MCHC RBC-ENTMCNC: 23 PG — LOW (ref 27–34)
MCHC RBC-ENTMCNC: 31.4 G/DL — LOW (ref 32–36)
MCV RBC AUTO: 73.2 FL — LOW (ref 80–100)
MONOCYTES # BLD AUTO: 0.45 K/UL — SIGNIFICANT CHANGE UP (ref 0–0.9)
MONOCYTES NFR BLD AUTO: 8.7 % — SIGNIFICANT CHANGE UP (ref 2–14)
NEUTROPHILS # BLD AUTO: 3.34 K/UL — SIGNIFICANT CHANGE UP (ref 1.8–7.4)
NEUTROPHILS NFR BLD AUTO: 64.2 % — SIGNIFICANT CHANGE UP (ref 43–77)
NRBC # BLD: 0 /100 WBCS — SIGNIFICANT CHANGE UP (ref 0–0)
PCO2 BLDV: 42 MMHG — SIGNIFICANT CHANGE UP (ref 42–55)
PH BLDV: 7.34 — SIGNIFICANT CHANGE UP (ref 7.32–7.43)
PLATELET # BLD AUTO: 174 K/UL — SIGNIFICANT CHANGE UP (ref 150–400)
PO2 BLDV: 35 MMHG — SIGNIFICANT CHANGE UP (ref 25–45)
POTASSIUM SERPL-MCNC: 3.8 MMOL/L — SIGNIFICANT CHANGE UP (ref 3.5–5.3)
POTASSIUM SERPL-SCNC: 3.8 MMOL/L — SIGNIFICANT CHANGE UP (ref 3.5–5.3)
PROT SERPL-MCNC: 8 GM/DL — SIGNIFICANT CHANGE UP (ref 6–8.3)
RBC # BLD: 5.18 M/UL — SIGNIFICANT CHANGE UP (ref 4.2–5.8)
RBC # FLD: 19.4 % — HIGH (ref 10.3–14.5)
SAO2 % BLDV: 54.5 % — LOW (ref 94–98)
SARS-COV-2 RNA SPEC QL NAA+PROBE: SIGNIFICANT CHANGE UP
SODIUM SERPL-SCNC: 140 MMOL/L — SIGNIFICANT CHANGE UP (ref 135–145)
WBC # BLD: 5.12 K/UL — SIGNIFICANT CHANGE UP (ref 3.8–10.5)
WBC # FLD AUTO: 5.12 K/UL — SIGNIFICANT CHANGE UP (ref 3.8–10.5)

## 2022-07-12 PROCEDURE — 99285 EMERGENCY DEPT VISIT HI MDM: CPT

## 2022-07-12 PROCEDURE — 93010 ELECTROCARDIOGRAM REPORT: CPT

## 2022-07-12 RX ORDER — SODIUM CHLORIDE 9 MG/ML
1000 INJECTION, SOLUTION INTRAVENOUS
Refills: 0 | Status: COMPLETED | OUTPATIENT
Start: 2022-07-12 | End: 2022-07-12

## 2022-07-12 RX ORDER — FAMOTIDINE 10 MG/ML
20 INJECTION INTRAVENOUS ONCE
Refills: 0 | Status: COMPLETED | OUTPATIENT
Start: 2022-07-12 | End: 2022-07-12

## 2022-07-12 RX ORDER — KETOROLAC TROMETHAMINE 30 MG/ML
15 SYRINGE (ML) INJECTION ONCE
Refills: 0 | Status: DISCONTINUED | OUTPATIENT
Start: 2022-07-12 | End: 2022-07-12

## 2022-07-12 RX ORDER — SODIUM CHLORIDE 9 MG/ML
1000 INJECTION INTRAMUSCULAR; INTRAVENOUS; SUBCUTANEOUS ONCE
Refills: 0 | Status: COMPLETED | OUTPATIENT
Start: 2022-07-12 | End: 2022-07-12

## 2022-07-12 RX ORDER — MORPHINE SULFATE 50 MG/1
4 CAPSULE, EXTENDED RELEASE ORAL ONCE
Refills: 0 | Status: DISCONTINUED | OUTPATIENT
Start: 2022-07-12 | End: 2022-07-12

## 2022-07-12 RX ORDER — ONDANSETRON 8 MG/1
4 TABLET, FILM COATED ORAL ONCE
Refills: 0 | Status: COMPLETED | OUTPATIENT
Start: 2022-07-12 | End: 2022-07-12

## 2022-07-12 RX ADMIN — MORPHINE SULFATE 4 MILLIGRAM(S): 50 CAPSULE, EXTENDED RELEASE ORAL at 08:45

## 2022-07-12 RX ADMIN — Medication 1 MILLIGRAM(S): at 08:50

## 2022-07-12 RX ADMIN — Medication 30 MILLILITER(S): at 11:08

## 2022-07-12 RX ADMIN — SODIUM CHLORIDE 1000 MILLILITER(S): 9 INJECTION INTRAMUSCULAR; INTRAVENOUS; SUBCUTANEOUS at 10:00

## 2022-07-12 RX ADMIN — ONDANSETRON 4 MILLIGRAM(S): 8 TABLET, FILM COATED ORAL at 08:11

## 2022-07-12 RX ADMIN — FAMOTIDINE 20 MILLIGRAM(S): 10 INJECTION INTRAVENOUS at 08:11

## 2022-07-12 RX ADMIN — MORPHINE SULFATE 4 MILLIGRAM(S): 50 CAPSULE, EXTENDED RELEASE ORAL at 08:26

## 2022-07-12 RX ADMIN — SODIUM CHLORIDE 1000 MILLILITER(S): 9 INJECTION, SOLUTION INTRAVENOUS at 08:51

## 2022-07-12 RX ADMIN — SODIUM CHLORIDE 1000 MILLILITER(S): 9 INJECTION, SOLUTION INTRAVENOUS at 10:00

## 2022-07-12 RX ADMIN — SODIUM CHLORIDE 1000 MILLILITER(S): 9 INJECTION INTRAMUSCULAR; INTRAVENOUS; SUBCUTANEOUS at 08:12

## 2022-07-12 RX ADMIN — Medication 15 MILLIGRAM(S): at 11:08

## 2022-07-12 NOTE — ED PROVIDER NOTE - PATIENT PORTAL LINK FT
You can access the FollowMyHealth Patient Portal offered by [facility] by registering at the following website: [email or web address]. By joining [employer]’s FollowMyHealth portal, you will also be able to view your health information using other applications (apps) compatible with our system.

## 2022-07-12 NOTE — ED PROVIDER NOTE - CLINICAL SUMMARY MEDICAL DECISION MAKING FREE TEXT BOX
patient appears at his baseline, only less intoxicated, abdominal labs unremarkable, patient withmultiple recent normal CTs. treated for gastritis with improvement in symptoms. lactate downtrending. tolerating po. will dc with retunr precuations. no clinical signs of withdrawal, prophnylactic ativan given as aptient with long history of easily provoked withdrawal

## 2022-07-12 NOTE — ED ADULT NURSE NOTE - NSIMPLEMENTINTERV_GEN_ALL_ED
Implemented All Fall Risk Interventions:  [place] to call system. Call bell, personal items and telephone within reach. Instruct patient to call for assistance. Room bathroom lighting operational. Non-slip footwear when patient is off stretcher. Physically safe environment: no spills, clutter or unnecessary equipment. Stretcher in lowest position, wheels locked, appropriate side rails in place. Provide visual cue, wrist band, yellow gown, etc. Monitor gait and stability. Monitor for mental status changes and reorient to person, place, and time. Review medications for side effects contributing to fall risk. Reinforce activity limits and safety measures with patient and family.

## 2022-07-12 NOTE — ED ADULT NURSE NOTE - OBJECTIVE STATEMENT
A&Ox4, c/o abdominal pain. pt states "my stomach hurts, pain all over" Pt states "I have not eaten anything for 2 days. A&Ox4, c/o abdominal pain. pt states "my stomach hurts, pain all over" Pt states "I have not eaten anything for 2 days, and I have been vomiting all night" Pt denies: sob, weakness, blurry vision, headache, chest pain.

## 2022-07-12 NOTE — ED ADULT NURSE NOTE - ED STAT RN HANDOFF DETAILS
Report endorsed to PRETTY Prado. Safety checks completed this shift. Safety rounds completed hourly.  No IV. On cardiac monitor. Fall & skin precautions in place. Pending provider evaluation. Any issues endorsed to PRETTY Prado for follow up.

## 2022-07-12 NOTE — ED ADULT TRIAGE NOTE - CHIEF COMPLAINT QUOTE
BIBA complaining of abdominal pain  started 2 days ago, vomiting "all night" as per pt. denies cp, diarrhea, fever, constipation.

## 2022-07-15 RX ADMIN — SODIUM CHLORIDE 2000 MILLILITER(S): 9 INJECTION INTRAMUSCULAR; INTRAVENOUS; SUBCUTANEOUS at 23:00

## 2022-07-16 ENCOUNTER — EMERGENCY (EMERGENCY)
Facility: HOSPITAL | Age: 57
LOS: 0 days | Discharge: HOME | End: 2022-07-17
Attending: STUDENT IN AN ORGANIZED HEALTH CARE EDUCATION/TRAINING PROGRAM

## 2022-07-16 VITALS
HEIGHT: 67 IN | OXYGEN SATURATION: 98 % | RESPIRATION RATE: 19 BRPM | HEART RATE: 102 BPM | WEIGHT: 210.1 LBS | SYSTOLIC BLOOD PRESSURE: 150 MMHG | DIASTOLIC BLOOD PRESSURE: 100 MMHG | TEMPERATURE: 100 F

## 2022-07-16 DIAGNOSIS — R10.9 UNSPECIFIED ABDOMINAL PAIN: ICD-10-CM

## 2022-07-16 DIAGNOSIS — M79.89 OTHER SPECIFIED SOFT TISSUE DISORDERS: ICD-10-CM

## 2022-07-16 DIAGNOSIS — F10.929 ALCOHOL USE, UNSPECIFIED WITH INTOXICATION, UNSPECIFIED: ICD-10-CM

## 2022-07-16 DIAGNOSIS — I10 ESSENTIAL (PRIMARY) HYPERTENSION: ICD-10-CM

## 2022-07-16 DIAGNOSIS — R74.02 ELEVATION OF LEVELS OF LACTIC ACID DEHYDROGENASE [LDH]: ICD-10-CM

## 2022-07-16 DIAGNOSIS — Z20.822 CONTACT WITH AND (SUSPECTED) EXPOSURE TO COVID-19: ICD-10-CM

## 2022-07-16 DIAGNOSIS — E78.5 HYPERLIPIDEMIA, UNSPECIFIED: ICD-10-CM

## 2022-07-16 DIAGNOSIS — Z87.19 PERSONAL HISTORY OF OTHER DISEASES OF THE DIGESTIVE SYSTEM: ICD-10-CM

## 2022-07-16 LAB
ALBUMIN SERPL ELPH-MCNC: 3.9 G/DL — SIGNIFICANT CHANGE UP (ref 3.3–5)
ALP SERPL-CCNC: 60 U/L — SIGNIFICANT CHANGE UP (ref 40–120)
ALT FLD-CCNC: 33 U/L — SIGNIFICANT CHANGE UP (ref 12–78)
AMMONIA BLD-MCNC: 38 UMOL/L — HIGH (ref 11–32)
ANION GAP SERPL CALC-SCNC: 15 MMOL/L — SIGNIFICANT CHANGE UP (ref 5–17)
APPEARANCE UR: CLEAR — SIGNIFICANT CHANGE UP
APTT BLD: 28.5 SEC — SIGNIFICANT CHANGE UP (ref 27.5–35.5)
AST SERPL-CCNC: 37 U/L — SIGNIFICANT CHANGE UP (ref 15–37)
BACTERIA # UR AUTO: ABNORMAL
BASOPHILS # BLD AUTO: 0.04 K/UL — SIGNIFICANT CHANGE UP (ref 0–0.2)
BASOPHILS NFR BLD AUTO: 1.2 % — SIGNIFICANT CHANGE UP (ref 0–2)
BILIRUB SERPL-MCNC: 0.3 MG/DL — SIGNIFICANT CHANGE UP (ref 0.2–1.2)
BILIRUB UR-MCNC: NEGATIVE — SIGNIFICANT CHANGE UP
BUN SERPL-MCNC: 11 MG/DL — SIGNIFICANT CHANGE UP (ref 7–23)
CALCIUM SERPL-MCNC: 8.9 MG/DL — SIGNIFICANT CHANGE UP (ref 8.5–10.1)
CHLORIDE SERPL-SCNC: 107 MMOL/L — SIGNIFICANT CHANGE UP (ref 96–108)
CK SERPL-CCNC: 351 U/L — HIGH (ref 26–308)
CO2 SERPL-SCNC: 23 MMOL/L — SIGNIFICANT CHANGE UP (ref 22–31)
COLOR SPEC: YELLOW — SIGNIFICANT CHANGE UP
CREAT SERPL-MCNC: 1.08 MG/DL — SIGNIFICANT CHANGE UP (ref 0.5–1.3)
DIFF PNL FLD: ABNORMAL
EGFR: 81 ML/MIN/1.73M2 — SIGNIFICANT CHANGE UP
EOSINOPHIL # BLD AUTO: 0.04 K/UL — SIGNIFICANT CHANGE UP (ref 0–0.5)
EOSINOPHIL NFR BLD AUTO: 1.2 % — SIGNIFICANT CHANGE UP (ref 0–6)
EPI CELLS # UR: SIGNIFICANT CHANGE UP
ETHANOL SERPL-MCNC: 335 MG/DL — HIGH (ref 0–10)
FLUAV AG NPH QL: SIGNIFICANT CHANGE UP
FLUBV AG NPH QL: SIGNIFICANT CHANGE UP
GLUCOSE SERPL-MCNC: 83 MG/DL — SIGNIFICANT CHANGE UP (ref 70–99)
GLUCOSE UR QL: NEGATIVE MG/DL — SIGNIFICANT CHANGE UP
HCT VFR BLD CALC: 38.5 % — LOW (ref 39–50)
HGB BLD-MCNC: 12.3 G/DL — LOW (ref 13–17)
IMM GRANULOCYTES NFR BLD AUTO: 0 % — SIGNIFICANT CHANGE UP (ref 0–1.5)
INR BLD: 0.94 RATIO — SIGNIFICANT CHANGE UP (ref 0.88–1.16)
KETONES UR-MCNC: ABNORMAL
LACTATE SERPL-SCNC: 5.3 MMOL/L — CRITICAL HIGH (ref 0.7–2)
LEUKOCYTE ESTERASE UR-ACNC: NEGATIVE — SIGNIFICANT CHANGE UP
LIDOCAIN IGE QN: 203 U/L — SIGNIFICANT CHANGE UP (ref 73–393)
LYMPHOCYTES # BLD AUTO: 2.04 K/UL — SIGNIFICANT CHANGE UP (ref 1–3.3)
LYMPHOCYTES # BLD AUTO: 63.2 % — HIGH (ref 13–44)
MAGNESIUM SERPL-MCNC: 2.1 MG/DL — SIGNIFICANT CHANGE UP (ref 1.6–2.6)
MCHC RBC-ENTMCNC: 23.3 PG — LOW (ref 27–34)
MCHC RBC-ENTMCNC: 31.9 G/DL — LOW (ref 32–36)
MCV RBC AUTO: 72.8 FL — LOW (ref 80–100)
MONOCYTES # BLD AUTO: 0.23 K/UL — SIGNIFICANT CHANGE UP (ref 0–0.9)
MONOCYTES NFR BLD AUTO: 7.1 % — SIGNIFICANT CHANGE UP (ref 2–14)
NEUTROPHILS # BLD AUTO: 0.88 K/UL — LOW (ref 1.8–7.4)
NEUTROPHILS NFR BLD AUTO: 27.3 % — LOW (ref 43–77)
NITRITE UR-MCNC: NEGATIVE — SIGNIFICANT CHANGE UP
NRBC # BLD: 0 /100 WBCS — SIGNIFICANT CHANGE UP (ref 0–0)
PH UR: 6 — SIGNIFICANT CHANGE UP (ref 5–8)
PLATELET # BLD AUTO: 251 K/UL — SIGNIFICANT CHANGE UP (ref 150–400)
POTASSIUM SERPL-MCNC: 3.7 MMOL/L — SIGNIFICANT CHANGE UP (ref 3.5–5.3)
POTASSIUM SERPL-SCNC: 3.7 MMOL/L — SIGNIFICANT CHANGE UP (ref 3.5–5.3)
PROT SERPL-MCNC: 8.1 GM/DL — SIGNIFICANT CHANGE UP (ref 6–8.3)
PROT UR-MCNC: NEGATIVE MG/DL — SIGNIFICANT CHANGE UP
PROTHROM AB SERPL-ACNC: 11.2 SEC — SIGNIFICANT CHANGE UP (ref 10.5–13.4)
RBC # BLD: 5.29 M/UL — SIGNIFICANT CHANGE UP (ref 4.2–5.8)
RBC # FLD: 19.9 % — HIGH (ref 10.3–14.5)
RBC CASTS # UR COMP ASSIST: ABNORMAL /HPF (ref 0–4)
SARS-COV-2 RNA SPEC QL NAA+PROBE: SIGNIFICANT CHANGE UP
SODIUM SERPL-SCNC: 145 MMOL/L — SIGNIFICANT CHANGE UP (ref 135–145)
SP GR SPEC: 1.01 — SIGNIFICANT CHANGE UP (ref 1.01–1.02)
UROBILINOGEN FLD QL: NEGATIVE MG/DL — SIGNIFICANT CHANGE UP
WBC # BLD: 3.23 K/UL — LOW (ref 3.8–10.5)
WBC # FLD AUTO: 3.23 K/UL — LOW (ref 3.8–10.5)
WBC UR QL: SIGNIFICANT CHANGE UP

## 2022-07-16 PROCEDURE — 93010 ELECTROCARDIOGRAM REPORT: CPT

## 2022-07-16 PROCEDURE — 71045 X-RAY EXAM CHEST 1 VIEW: CPT | Mod: 26

## 2022-07-16 PROCEDURE — 99285 EMERGENCY DEPT VISIT HI MDM: CPT

## 2022-07-16 RX ORDER — DIAZEPAM 5 MG
10 TABLET ORAL ONCE
Refills: 0 | Status: DISCONTINUED | OUTPATIENT
Start: 2022-07-16 | End: 2022-07-16

## 2022-07-16 RX ORDER — ONDANSETRON 8 MG/1
4 TABLET, FILM COATED ORAL ONCE
Refills: 0 | Status: COMPLETED | OUTPATIENT
Start: 2022-07-16 | End: 2022-07-16

## 2022-07-16 RX ORDER — MORPHINE SULFATE 50 MG/1
4 CAPSULE, EXTENDED RELEASE ORAL ONCE
Refills: 0 | Status: DISCONTINUED | OUTPATIENT
Start: 2022-07-16 | End: 2022-07-16

## 2022-07-16 RX ORDER — SODIUM CHLORIDE 9 MG/ML
2000 INJECTION INTRAMUSCULAR; INTRAVENOUS; SUBCUTANEOUS ONCE
Refills: 0 | Status: COMPLETED | OUTPATIENT
Start: 2022-07-16 | End: 2022-07-16

## 2022-07-16 RX ADMIN — ONDANSETRON 4 MILLIGRAM(S): 8 TABLET, FILM COATED ORAL at 21:15

## 2022-07-16 RX ADMIN — SODIUM CHLORIDE 2000 MILLILITER(S): 9 INJECTION INTRAMUSCULAR; INTRAVENOUS; SUBCUTANEOUS at 20:59

## 2022-07-16 RX ADMIN — ONDANSETRON 4 MILLIGRAM(S): 8 TABLET, FILM COATED ORAL at 23:16

## 2022-07-16 RX ADMIN — Medication 10 MILLIGRAM(S): at 20:59

## 2022-07-16 RX ADMIN — MORPHINE SULFATE 4 MILLIGRAM(S): 50 CAPSULE, EXTENDED RELEASE ORAL at 22:28

## 2022-07-16 NOTE — ED ADULT NURSE NOTE - OBJECTIVE STATEMENT
reiceved patine tin 39W. pt admits to madalyn davey in triage. noted to have bilateral hand swelling. c/o generalized body pain. pt poor hx at this time.

## 2022-07-16 NOTE — ED PROVIDER NOTE - CLINICAL SUMMARY MEDICAL DECISION MAKING FREE TEXT BOX
patient well known to me, evalauted for possible new causes of his likely chronic abdominal pain, lactate elevated, CT obtained which is normal, no clinical concern for aortic pathology. lactate elevated however downtrending in ED, prophylactice benzodiazepens given as patient with miltiple admissions for withdrawal, mild hand swelilng rpesnet-  no clinical signs of infection, xray unremrkable other than mild soft tissue swelling. no signs of dvt, pulses and distal cap refill intact, unclear cause of swelling however no clinical signs of dangerous cause. safe for dc

## 2022-07-16 NOTE — ED PROVIDER NOTE - IV ALTEPLASE EXCL ABS HIDDEN
ROOM # 2    Chest pain: no  Shortness of breath: no  Edema: no  Palpitations, Skipped beats, Rapid heartbeat: off and on  Dizziness: no    New diagnosis/Surgeries: no    ER/Hospitalizations: 11/4/2020: [facility] ED- PAF    Refills: No    Visit Vitals  /84 (BP 1 Location: Left arm, BP Patient Position: Sitting)   Pulse (!) 56   Resp 16   Ht 5' 11.5\" (1.816 m)   Wt 185 lb 9.6 oz (84.2 kg)   SpO2 96%   BMI 25.53 kg/m² show

## 2022-07-16 NOTE — ED PROVIDER NOTE - OBJECTIVE STATEMENT
57 years old male with h/o HTN, HLD, ETOH multipl ed presentations for alcoholic gastritis and etoh withdrawal presenting for abdominal pain, elcohol intxocation. also states his hands are swollen. no trauma. no pain to the hands

## 2022-07-16 NOTE — ED PROVIDER NOTE - PHYSICAL EXAMINATION
General: Awake, alert and oriented. No acute distress. Well developed, hydrated and nourished. Appears stated age.   Skin: Skin in warm, dry and intact without rashes or lesions. Appropriate color for ethnicity  HENMT: head normocephalic and atraumatic; bilateral external ears without swelling. no nasal discharge. moist oral mucosa. supple neck, trachea midline  EYES: Conjunctiva clear. nonicteric sclera. EOM intact, Eyelids are normal in appearance without swelling or lesions.  Cardiac: well perfused  Respiratory: breathing comfortably on room air. no audible wheezing or stridor  Abdominal: nondistended, soft, nontender  MSK: Neck and back are without deformity, visible external skin changes, or signs of trauma. Curvature of the cervical, thoracic, and lumbar spine are within normal limits. moderate swelling with mild redness to both hands. 2+ radial pulses bilaterally, brisk cap refill of bilateral hands, no tedneress of either hands,  strength full nd intact bilaterally, no swelling, erythema or tenderness to either arm proximally to the wrist. no external signs of trauma. no apparent deficits in ROM of any extremity  Neurological: The patient is awake, alert and oriented to person, place, and time with normal speech. CN 2-12 grossly intact. no apparent deficits. Memory is normal and thought process is intact.   Psychiatric: Appropriate mood and affect. Good judgement and insight. No visual or auditory hallucinations.

## 2022-07-16 NOTE — ED PROVIDER NOTE - PATIENT PORTAL LINK FT
You can access the FollowMyHealth Patient Portal offered by [facility] by registering at the following website: [email or web address]. By joining [employer]’s FollowMyHealth portal, you will also be able to view your health information using other applications (apps) compatible with our system.

## 2022-07-17 VITALS
HEART RATE: 66 BPM | OXYGEN SATURATION: 94 % | DIASTOLIC BLOOD PRESSURE: 82 MMHG | SYSTOLIC BLOOD PRESSURE: 152 MMHG | RESPIRATION RATE: 18 BRPM

## 2022-07-17 LAB
LACTATE SERPL-SCNC: 5.8 MMOL/L — CRITICAL HIGH (ref 0.7–2)
LACTATE SERPL-SCNC: 6.4 MMOL/L — CRITICAL HIGH (ref 0.7–2)

## 2022-07-17 PROCEDURE — 73120 X-RAY EXAM OF HAND: CPT | Mod: 26,LT,RT

## 2022-07-17 PROCEDURE — 74177 CT ABD & PELVIS W/CONTRAST: CPT | Mod: 26,MA

## 2022-07-17 RX ORDER — ONDANSETRON 8 MG/1
4 TABLET, FILM COATED ORAL ONCE
Refills: 0 | Status: COMPLETED | OUTPATIENT
Start: 2022-07-17 | End: 2022-07-17

## 2022-07-17 RX ORDER — MORPHINE SULFATE 50 MG/1
4 CAPSULE, EXTENDED RELEASE ORAL ONCE
Refills: 0 | Status: DISCONTINUED | OUTPATIENT
Start: 2022-07-17 | End: 2022-07-17

## 2022-07-17 RX ORDER — SODIUM CHLORIDE 9 MG/ML
1000 INJECTION INTRAMUSCULAR; INTRAVENOUS; SUBCUTANEOUS ONCE
Refills: 0 | Status: COMPLETED | OUTPATIENT
Start: 2022-07-17 | End: 2022-07-17

## 2022-07-17 RX ORDER — METOCLOPRAMIDE HCL 10 MG
10 TABLET ORAL ONCE
Refills: 0 | Status: COMPLETED | OUTPATIENT
Start: 2022-07-17 | End: 2022-07-17

## 2022-07-17 RX ORDER — FAMOTIDINE 10 MG/ML
20 INJECTION INTRAVENOUS ONCE
Refills: 0 | Status: COMPLETED | OUTPATIENT
Start: 2022-07-17 | End: 2022-07-17

## 2022-07-17 RX ADMIN — SODIUM CHLORIDE 1000 MILLILITER(S): 9 INJECTION INTRAMUSCULAR; INTRAVENOUS; SUBCUTANEOUS at 03:24

## 2022-07-17 RX ADMIN — Medication 25 MILLIGRAM(S): at 04:49

## 2022-07-17 RX ADMIN — Medication 10 MILLIGRAM(S): at 05:02

## 2022-07-17 RX ADMIN — MORPHINE SULFATE 4 MILLIGRAM(S): 50 CAPSULE, EXTENDED RELEASE ORAL at 01:51

## 2022-07-17 RX ADMIN — FAMOTIDINE 20 MILLIGRAM(S): 10 INJECTION INTRAVENOUS at 01:50

## 2022-07-17 RX ADMIN — MORPHINE SULFATE 4 MILLIGRAM(S): 50 CAPSULE, EXTENDED RELEASE ORAL at 05:00

## 2022-07-17 RX ADMIN — MORPHINE SULFATE 4 MILLIGRAM(S): 50 CAPSULE, EXTENDED RELEASE ORAL at 00:00

## 2022-07-17 RX ADMIN — Medication 30 MILLILITER(S): at 03:39

## 2022-07-17 RX ADMIN — ONDANSETRON 4 MILLIGRAM(S): 8 TABLET, FILM COATED ORAL at 01:49

## 2022-07-17 RX ADMIN — MORPHINE SULFATE 4 MILLIGRAM(S): 50 CAPSULE, EXTENDED RELEASE ORAL at 03:24

## 2022-07-17 RX ADMIN — Medication 25 MILLIGRAM(S): at 01:49

## 2022-07-17 RX ADMIN — MORPHINE SULFATE 4 MILLIGRAM(S): 50 CAPSULE, EXTENDED RELEASE ORAL at 03:39

## 2022-07-17 RX ADMIN — SODIUM CHLORIDE 1000 MILLILITER(S): 9 INJECTION INTRAMUSCULAR; INTRAVENOUS; SUBCUTANEOUS at 01:49

## 2022-07-17 NOTE — ED ADULT NURSE REASSESSMENT NOTE - NS ED NURSE REASSESS COMMENT FT1
pt ambulates independently with steady gait. NAD. IV removed. contacted wife for p/u. pt to be p/u by wife. pt ready to be d/c.

## 2022-07-18 LAB
CULTURE RESULTS: SIGNIFICANT CHANGE UP
SPECIMEN SOURCE: SIGNIFICANT CHANGE UP

## 2022-07-19 ENCOUNTER — INPATIENT (INPATIENT)
Facility: HOSPITAL | Age: 57
LOS: 1 days | Discharge: HOME | End: 2022-07-21
Attending: INTERNAL MEDICINE | Admitting: INTERNAL MEDICINE

## 2022-07-19 VITALS
SYSTOLIC BLOOD PRESSURE: 136 MMHG | HEIGHT: 67 IN | HEART RATE: 110 BPM | RESPIRATION RATE: 16 BRPM | WEIGHT: 179.9 LBS | DIASTOLIC BLOOD PRESSURE: 97 MMHG | TEMPERATURE: 99 F | OXYGEN SATURATION: 94 %

## 2022-07-19 LAB
ALBUMIN SERPL ELPH-MCNC: 3.9 G/DL — SIGNIFICANT CHANGE UP (ref 3.3–5)
ALP SERPL-CCNC: 61 U/L — SIGNIFICANT CHANGE UP (ref 40–120)
ALT FLD-CCNC: 47 U/L — SIGNIFICANT CHANGE UP (ref 12–78)
AMMONIA BLD-MCNC: 36 UMOL/L — HIGH (ref 11–32)
ANION GAP SERPL CALC-SCNC: 12 MMOL/L — SIGNIFICANT CHANGE UP (ref 5–17)
APTT BLD: 30 SEC — SIGNIFICANT CHANGE UP (ref 27.5–35.5)
AST SERPL-CCNC: 71 U/L — HIGH (ref 15–37)
BASOPHILS # BLD AUTO: 0.03 K/UL — SIGNIFICANT CHANGE UP (ref 0–0.2)
BASOPHILS NFR BLD AUTO: 0.9 % — SIGNIFICANT CHANGE UP (ref 0–2)
BILIRUB SERPL-MCNC: 0.5 MG/DL — SIGNIFICANT CHANGE UP (ref 0.2–1.2)
BUN SERPL-MCNC: 7 MG/DL — SIGNIFICANT CHANGE UP (ref 7–23)
CALCIUM SERPL-MCNC: 8.8 MG/DL — SIGNIFICANT CHANGE UP (ref 8.5–10.1)
CHLORIDE SERPL-SCNC: 109 MMOL/L — HIGH (ref 96–108)
CO2 SERPL-SCNC: 27 MMOL/L — SIGNIFICANT CHANGE UP (ref 22–31)
CREAT SERPL-MCNC: 1.1 MG/DL — SIGNIFICANT CHANGE UP (ref 0.5–1.3)
EGFR: 79 ML/MIN/1.73M2 — SIGNIFICANT CHANGE UP
EOSINOPHIL # BLD AUTO: 0.09 K/UL — SIGNIFICANT CHANGE UP (ref 0–0.5)
EOSINOPHIL NFR BLD AUTO: 2.6 % — SIGNIFICANT CHANGE UP (ref 0–6)
ETHANOL SERPL-MCNC: 411 MG/DL — HIGH (ref 0–10)
FLUAV AG NPH QL: SIGNIFICANT CHANGE UP
FLUBV AG NPH QL: SIGNIFICANT CHANGE UP
GLUCOSE BLDC GLUCOMTR-MCNC: 98 MG/DL — SIGNIFICANT CHANGE UP (ref 70–99)
GLUCOSE SERPL-MCNC: 102 MG/DL — HIGH (ref 70–99)
HCT VFR BLD CALC: 38.7 % — LOW (ref 39–50)
HGB BLD-MCNC: 12.3 G/DL — LOW (ref 13–17)
IMM GRANULOCYTES NFR BLD AUTO: 0.3 % — SIGNIFICANT CHANGE UP (ref 0–1.5)
INR BLD: 0.95 RATIO — SIGNIFICANT CHANGE UP (ref 0.88–1.16)
LIDOCAIN IGE QN: 269 U/L — SIGNIFICANT CHANGE UP (ref 73–393)
LYMPHOCYTES # BLD AUTO: 2.25 K/UL — SIGNIFICANT CHANGE UP (ref 1–3.3)
LYMPHOCYTES # BLD AUTO: 64.8 % — HIGH (ref 13–44)
MCHC RBC-ENTMCNC: 23.3 PG — LOW (ref 27–34)
MCHC RBC-ENTMCNC: 31.8 G/DL — LOW (ref 32–36)
MCV RBC AUTO: 73.4 FL — LOW (ref 80–100)
MONOCYTES # BLD AUTO: 0.24 K/UL — SIGNIFICANT CHANGE UP (ref 0–0.9)
MONOCYTES NFR BLD AUTO: 6.9 % — SIGNIFICANT CHANGE UP (ref 2–14)
NEUTROPHILS # BLD AUTO: 0.85 K/UL — LOW (ref 1.8–7.4)
NEUTROPHILS NFR BLD AUTO: 24.5 % — LOW (ref 43–77)
NRBC # BLD: 0 /100 WBCS — SIGNIFICANT CHANGE UP (ref 0–0)
PLATELET # BLD AUTO: 216 K/UL — SIGNIFICANT CHANGE UP (ref 150–400)
POTASSIUM SERPL-MCNC: 3.7 MMOL/L — SIGNIFICANT CHANGE UP (ref 3.5–5.3)
POTASSIUM SERPL-SCNC: 3.7 MMOL/L — SIGNIFICANT CHANGE UP (ref 3.5–5.3)
PROT SERPL-MCNC: 8.1 GM/DL — SIGNIFICANT CHANGE UP (ref 6–8.3)
PROTHROM AB SERPL-ACNC: 11.4 SEC — SIGNIFICANT CHANGE UP (ref 10.5–13.4)
RBC # BLD: 5.27 M/UL — SIGNIFICANT CHANGE UP (ref 4.2–5.8)
RBC # FLD: 20.4 % — HIGH (ref 10.3–14.5)
SARS-COV-2 RNA SPEC QL NAA+PROBE: SIGNIFICANT CHANGE UP
SODIUM SERPL-SCNC: 148 MMOL/L — HIGH (ref 135–145)
TROPONIN I, HIGH SENSITIVITY RESULT: 8.6 NG/L — SIGNIFICANT CHANGE UP
WBC # BLD: 3.47 K/UL — LOW (ref 3.8–10.5)
WBC # FLD AUTO: 3.47 K/UL — LOW (ref 3.8–10.5)

## 2022-07-19 PROCEDURE — 36000 PLACE NEEDLE IN VEIN: CPT

## 2022-07-19 PROCEDURE — 71045 X-RAY EXAM CHEST 1 VIEW: CPT | Mod: 26

## 2022-07-19 PROCEDURE — 70496 CT ANGIOGRAPHY HEAD: CPT | Mod: 26,MA

## 2022-07-19 PROCEDURE — 99285 EMERGENCY DEPT VISIT HI MDM: CPT | Mod: 25

## 2022-07-19 PROCEDURE — 93010 ELECTROCARDIOGRAM REPORT: CPT

## 2022-07-19 PROCEDURE — 70498 CT ANGIOGRAPHY NECK: CPT | Mod: 26,MA

## 2022-07-19 PROCEDURE — 0042T: CPT | Mod: MA

## 2022-07-19 RX ORDER — ONDANSETRON 8 MG/1
4 TABLET, FILM COATED ORAL ONCE
Refills: 0 | Status: COMPLETED | OUTPATIENT
Start: 2022-07-19 | End: 2022-07-19

## 2022-07-19 RX ORDER — DEXTROSE 50 % IN WATER 50 %
50 SYRINGE (ML) INTRAVENOUS ONCE
Refills: 0 | Status: DISCONTINUED | OUTPATIENT
Start: 2022-07-19 | End: 2022-07-19

## 2022-07-19 RX ORDER — METOCLOPRAMIDE HCL 10 MG
10 TABLET ORAL ONCE
Refills: 0 | Status: COMPLETED | OUTPATIENT
Start: 2022-07-19 | End: 2022-07-19

## 2022-07-19 RX ORDER — SODIUM CHLORIDE 9 MG/ML
1000 INJECTION INTRAMUSCULAR; INTRAVENOUS; SUBCUTANEOUS ONCE
Refills: 0 | Status: COMPLETED | OUTPATIENT
Start: 2022-07-19 | End: 2022-07-19

## 2022-07-19 RX ADMIN — SODIUM CHLORIDE 1000 MILLILITER(S): 9 INJECTION INTRAMUSCULAR; INTRAVENOUS; SUBCUTANEOUS at 19:30

## 2022-07-19 RX ADMIN — SODIUM CHLORIDE 1000 MILLILITER(S): 9 INJECTION INTRAMUSCULAR; INTRAVENOUS; SUBCUTANEOUS at 17:52

## 2022-07-19 RX ADMIN — ONDANSETRON 4 MILLIGRAM(S): 8 TABLET, FILM COATED ORAL at 23:00

## 2022-07-19 RX ADMIN — Medication 10 MILLIGRAM(S): at 23:55

## 2022-07-19 RX ADMIN — ONDANSETRON 4 MILLIGRAM(S): 8 TABLET, FILM COATED ORAL at 17:49

## 2022-07-19 NOTE — ED PROVIDER NOTE - ATTENDING APP SHARED VISIT CONTRIBUTION OF CARE
57 year old alcoholic here altered, code stroke initially called, but pt intoxicated and cannot provide history. imaging, labs, meds, reassess.

## 2022-07-19 NOTE — ED PROVIDER NOTE - OBJECTIVE STATEMENT
58 y/o M pmhx HLD, PUD, alcohol use disorder with hx of withdrawal DTs brought to ER today from home AMS. patient is with eyes open following commands however aphasic and not following commands. patient is known to [facility] for multiple visits for intoxication. patient seemingly more altered from known baseline. as per EMS noone was in the home where he was picked up from. stroke code activated. patient vomited once in ED.

## 2022-07-19 NOTE — ED PROVIDER NOTE - CLINICAL SUMMARY MEDICAL DECISION MAKING FREE TEXT BOX
58 y/o M pmhx HLD, PUD, alcohol use disorder with hx of withdrawal DTs brought to ER today from home AMS. patient is with eyes open following commands however aphasic and not following commands. patient is known to [facility] for multiple visits for intoxication. patient seemingly more altered from known baseline. as per EMS noone was in the home where he was picked up from. stroke code activated. patient vomited once in ED. -- unable to complete neuro exam as patient is not following commands. -- stroke protocol as well as labs, ammonia and lipase, alcohol level

## 2022-07-19 NOTE — ED PROVIDER NOTE - PATIENT PORTAL LINK FT
You can access the FollowMyHealth Patient Portal offered by [facility] by registering at the following website: [email or web address]. By joining [employer]’s FollowMyHealth portal, you will also be able to view your health information using other applications (apps) compatible with our system.

## 2022-07-19 NOTE — ED ADULT NURSE NOTE - CHPI ED NUR SYMPTOMS POS
PAST MEDICAL HISTORY:  No pertinent past medical history      CONFUSION PAST MEDICAL HISTORY:  H/O cholecystitis     History of cholestasis during pregnancy

## 2022-07-19 NOTE — ED PROVIDER NOTE - PROGRESS NOTE DETAILS
NP Arnulfo: CT scan negative for acute stroke or bleed. alcohol level 411. patient more responsive to verbal stimuli and following commands moving all extremities. RIVKA Arredondo: patient was discharged from [facility] 2 days ago after treatment for alcoholic acidosis.    CT scan negative for acute stroke or bleed. alcohol level 411. patient more responsive to verbal stimuli and following commands moving all extremities. will continue with hydration. Patient is ambulatory in the ED, alert and oriented, requesting discharge. Vitals stable. Will discharge at this time. Sanjay Power DO Patient unable to tolerate PO despite multiple doses of zofran and reglan, refusing to leave the ED secondary to unmanaged pain, states he will just check back in if discharge. Will admit for N/V and inability to tolerate PO. Sanjay Power, DO

## 2022-07-19 NOTE — ED PROVIDER NOTE - NS_BEDUNITTYPES_ED_ALL_ED
I have reviewed and confirmed nurses' notes for patient's medications, allergies, medical history, and surgical history. MED/SURG

## 2022-07-19 NOTE — ED ADULT NURSE NOTE - OBJECTIVE STATEMENT
Patient A&OX2, BIBA expressive aphasia and noted with B/L edematous UE, unknown onset pmhx HLD, HTN, ETOH abuse. Patient is poor historian. Code stroke called. CT done. Labs and medication given as ordered

## 2022-07-19 NOTE — ED ADULT TRIAGE NOTE - CHIEF COMPLAINT QUOTE
Patient unable to answer any questions. as per EMS patient called EMS was able to open door but had unsteady gait, non verbal, with expressive aphasia and noted with B/L edematous UE, unknown onset  hx HLD, HTN, ETOH abuse

## 2022-07-20 DIAGNOSIS — K29.20 ALCOHOLIC GASTRITIS WITHOUT BLEEDING: ICD-10-CM

## 2022-07-20 DIAGNOSIS — I63.9 CEREBRAL INFARCTION, UNSPECIFIED: ICD-10-CM

## 2022-07-20 DIAGNOSIS — E87.0 HYPEROSMOLALITY AND HYPERNATREMIA: ICD-10-CM

## 2022-07-20 DIAGNOSIS — F10.230 ALCOHOL DEPENDENCE WITH WITHDRAWAL, UNCOMPLICATED: ICD-10-CM

## 2022-07-20 LAB
ANION GAP SERPL CALC-SCNC: 10 MMOL/L — SIGNIFICANT CHANGE UP (ref 5–17)
BUN SERPL-MCNC: 10 MG/DL — SIGNIFICANT CHANGE UP (ref 7–23)
CALCIUM SERPL-MCNC: 8.8 MG/DL — SIGNIFICANT CHANGE UP (ref 8.5–10.1)
CHLORIDE SERPL-SCNC: 104 MMOL/L — SIGNIFICANT CHANGE UP (ref 96–108)
CO2 SERPL-SCNC: 28 MMOL/L — SIGNIFICANT CHANGE UP (ref 22–31)
CREAT SERPL-MCNC: 0.99 MG/DL — SIGNIFICANT CHANGE UP (ref 0.5–1.3)
EGFR: 90 ML/MIN/1.73M2 — SIGNIFICANT CHANGE UP
GLUCOSE BLDC GLUCOMTR-MCNC: 96 MG/DL — SIGNIFICANT CHANGE UP (ref 70–99)
GLUCOSE SERPL-MCNC: 96 MG/DL — SIGNIFICANT CHANGE UP (ref 70–99)
MAGNESIUM SERPL-MCNC: 1.8 MG/DL — SIGNIFICANT CHANGE UP (ref 1.6–2.6)
PHOSPHATE SERPL-MCNC: 2.1 MG/DL — LOW (ref 2.5–4.5)
POTASSIUM SERPL-MCNC: 3.5 MMOL/L — SIGNIFICANT CHANGE UP (ref 3.5–5.3)
POTASSIUM SERPL-SCNC: 3.5 MMOL/L — SIGNIFICANT CHANGE UP (ref 3.5–5.3)
SODIUM SERPL-SCNC: 142 MMOL/L — SIGNIFICANT CHANGE UP (ref 135–145)

## 2022-07-20 PROCEDURE — 99222 1ST HOSP IP/OBS MODERATE 55: CPT

## 2022-07-20 RX ORDER — PHENOBARBITAL 60 MG
TABLET ORAL
Refills: 0 | Status: DISCONTINUED | OUTPATIENT
Start: 2022-07-20 | End: 2022-07-20

## 2022-07-20 RX ORDER — PHENOBARBITAL 60 MG
130 TABLET ORAL EVERY 6 HOURS
Refills: 0 | Status: DISCONTINUED | OUTPATIENT
Start: 2022-07-20 | End: 2022-07-20

## 2022-07-20 RX ORDER — FAMOTIDINE 10 MG/ML
20 INJECTION INTRAVENOUS ONCE
Refills: 0 | Status: COMPLETED | OUTPATIENT
Start: 2022-07-20 | End: 2022-07-20

## 2022-07-20 RX ORDER — ONDANSETRON 8 MG/1
4 TABLET, FILM COATED ORAL ONCE
Refills: 0 | Status: COMPLETED | OUTPATIENT
Start: 2022-07-20 | End: 2022-07-20

## 2022-07-20 RX ORDER — DIAZEPAM 5 MG
5 TABLET ORAL ONCE
Refills: 0 | Status: DISCONTINUED | OUTPATIENT
Start: 2022-07-20 | End: 2022-07-20

## 2022-07-20 RX ORDER — PHENOBARBITAL 60 MG
130 TABLET ORAL
Refills: 0 | Status: DISCONTINUED | OUTPATIENT
Start: 2022-07-20 | End: 2022-07-20

## 2022-07-20 RX ORDER — SODIUM,POTASSIUM PHOSPHATES 278-250MG
2 POWDER IN PACKET (EA) ORAL ONCE
Refills: 0 | Status: COMPLETED | OUTPATIENT
Start: 2022-07-20 | End: 2022-07-20

## 2022-07-20 RX ORDER — FOLIC ACID 0.8 MG
1 TABLET ORAL DAILY
Refills: 0 | Status: DISCONTINUED | OUTPATIENT
Start: 2022-07-20 | End: 2022-07-21

## 2022-07-20 RX ORDER — PANTOPRAZOLE SODIUM 20 MG/1
40 TABLET, DELAYED RELEASE ORAL
Refills: 0 | Status: DISCONTINUED | OUTPATIENT
Start: 2022-07-20 | End: 2022-07-21

## 2022-07-20 RX ORDER — SODIUM CHLORIDE 9 MG/ML
1000 INJECTION, SOLUTION INTRAVENOUS
Refills: 0 | Status: COMPLETED | OUTPATIENT
Start: 2022-07-20 | End: 2022-07-20

## 2022-07-20 RX ORDER — THIAMINE MONONITRATE (VIT B1) 100 MG
100 TABLET ORAL DAILY
Refills: 0 | Status: DISCONTINUED | OUTPATIENT
Start: 2022-07-20 | End: 2022-07-21

## 2022-07-20 RX ORDER — SIMVASTATIN 20 MG/1
20 TABLET, FILM COATED ORAL AT BEDTIME
Refills: 0 | Status: DISCONTINUED | OUTPATIENT
Start: 2022-07-20 | End: 2022-07-21

## 2022-07-20 RX ADMIN — SODIUM CHLORIDE 125 MILLILITER(S): 9 INJECTION, SOLUTION INTRAVENOUS at 04:26

## 2022-07-20 RX ADMIN — ONDANSETRON 4 MILLIGRAM(S): 8 TABLET, FILM COATED ORAL at 05:24

## 2022-07-20 RX ADMIN — Medication 2 MILLIGRAM(S): at 08:00

## 2022-07-20 RX ADMIN — Medication 1 MILLIGRAM(S): at 11:46

## 2022-07-20 RX ADMIN — PANTOPRAZOLE SODIUM 40 MILLIGRAM(S): 20 TABLET, DELAYED RELEASE ORAL at 06:35

## 2022-07-20 RX ADMIN — SODIUM CHLORIDE 125 MILLILITER(S): 9 INJECTION, SOLUTION INTRAVENOUS at 15:41

## 2022-07-20 RX ADMIN — PANTOPRAZOLE SODIUM 40 MILLIGRAM(S): 20 TABLET, DELAYED RELEASE ORAL at 18:10

## 2022-07-20 RX ADMIN — SIMVASTATIN 20 MILLIGRAM(S): 20 TABLET, FILM COATED ORAL at 22:55

## 2022-07-20 RX ADMIN — FAMOTIDINE 20 MILLIGRAM(S): 10 INJECTION INTRAVENOUS at 01:27

## 2022-07-20 RX ADMIN — Medication 130 MILLIGRAM(S): at 09:32

## 2022-07-20 RX ADMIN — Medication 130 MILLIGRAM(S): at 11:46

## 2022-07-20 RX ADMIN — Medication 2 TABLET(S): at 20:00

## 2022-07-20 RX ADMIN — Medication 5 MILLIGRAM(S): at 01:27

## 2022-07-20 RX ADMIN — Medication 2 MILLIGRAM(S): at 05:25

## 2022-07-20 RX ADMIN — Medication 50 MILLIGRAM(S): at 18:09

## 2022-07-20 RX ADMIN — Medication 100 MILLIGRAM(S): at 11:46

## 2022-07-20 RX ADMIN — Medication 50 MILLIGRAM(S): at 13:47

## 2022-07-20 NOTE — PATIENT PROFILE ADULT - FALL HARM RISK - RISK INTERVENTIONS
Assistance OOB with selected safe patient handling equipment/Assistance with ambulation/Communicate Fall Risk and Risk Factors to all staff, patient, and family/Discuss with provider need for PT consult/Monitor for mental status changes/Monitor gait and stability/Provide patient with walking aids - walker, cane, crutches/Reinforce activity limits and safety measures with patient and family/Toileting schedule using arm’s reach rule for commode and bathroom/Use of alarms - bed, chair and/or voice tab/Visual Cue: Yellow wristband/Bed in lowest position, wheels locked, appropriate side rails in place/Call bell, personal items and telephone in reach/Instruct patient to call for assistance before getting out of bed or chair/Non-slip footwear when patient is out of bed/[place] to call system/Physically safe environment - no spills, clutter or unnecessary equipment/Purposeful Proactive Rounding/Room/bathroom lighting operational, light cord in reach

## 2022-07-20 NOTE — H&P ADULT - NSHPPHYSICALEXAM_GEN_ALL_CORE
Physical exam:  General: patient in no acute distress, resting comfortably  Head:  Atraumatic, Normocephalic  Eyes: EOMI, PERRLA, clear sclera  Neck: Supple, thyroid nontender, non enlarged  Cardio: S1/S2 +ve, regular rate and rhythm, no M/G/R  Resp: clear to ausculation bilaterally, no rales or wheezes  GI: abdomen soft, nontender, non distended, no guarding, BS +ve x 4  Ext: no significant pedal edema  Neuro: could not complete due to patient currently being sedated.  Gross mvmt in all extremities  Skin: No rashes or lesions

## 2022-07-20 NOTE — ED ADULT NURSE REASSESSMENT NOTE - NS ED NURSE REASSESS COMMENT FT1
Dr. Salazar made aware of latest Lactate: 5.8 on 7/17. Dr. Salazar states he does not want to repeat Lactate at this time, does not want to order Bolus fluids and just wants to continue maintenance fluids at this time. Dr. Salazar states pt is OK for admission to Med/Surg unit. Receiving PRETTY gutiérrez, report given.

## 2022-07-20 NOTE — H&P ADULT - PROBLEM SELECTOR PLAN 1
CIWA currently 0, recently received vallium  Will continue withdrawal protocol with ativan q8 standing  Ativan 2 mg IVP PRN for acute withdrawal symptoms  Thiamine, Folate, MVI daily

## 2022-07-20 NOTE — CHART NOTE - NSCHARTNOTEFT_GEN_A_CORE
Pt admitted after midnight.  Presented intoxicated, altered.  Today complaining of abdominal pain.  Able to eat some foot.  No nausea / vomiting when I saw him.  Transitioned his alcohol withdrawal medications to librium with PRN lorazepam, all PO, would not proceed with IV phenobarbital currently on unmonitored med/surg unit, discussed with ordering ACP.  Should the patient require additional IV medications would utilize IV diazepam 10mg in consideration of lorazepam IV shortage.    Discussed with CM/social work, patient has had more frequent presentations than usual for him, need to evaluate for home stressors or other issues exacerbating his chronic substance use issues.    please note patient is RULED OUT for acute ischemic stroke.

## 2022-07-20 NOTE — H&P ADULT - NSHPLABSRESULTS_GEN_ALL_CORE
Recent Vitals  T(C): 36.7 (07-20-22 @ 02:06), Max: 37.1 (07-19-22 @ 16:57)  HR: 74 (07-20-22 @ 02:06) (74 - 110)  BP: 135/84 (07-20-22 @ 02:06) (127/39 - 145/92)  RR: 18 (07-20-22 @ 02:06) (16 - 18)  SpO2: 97% (07-20-22 @ 02:06) (94% - 100%)                        12.3   3.47  )-----------( 216      ( 19 Jul 2022 17:15 )             38.7     07-19    148<H>  |  109<H>  |  7   ----------------------------<  102<H>  3.7   |  27  |  1.10    Ca    8.8      19 Jul 2022 17:15    TPro  8.1  /  Alb  3.9  /  TBili  0.5  /  DBili  x   /  AST  71<H>  /  ALT  47  /  AlkPhos  61  07-19    PT/INR - ( 19 Jul 2022 17:15 )   PT: 11.4 sec;   INR: 0.95 ratio         PTT - ( 19 Jul 2022 17:15 )  PTT:30.0 sec  LIVER FUNCTIONS - ( 19 Jul 2022 17:15 )  Alb: 3.9 g/dL / Pro: 8.1 gm/dL / ALK PHOS: 61 U/L / ALT: 47 U/L / AST: 71 U/L / GGT: x               Home Medications:  simvastatin 20 mg oral tablet: 1 tab(s) orally once a day (at bedtime) (01 Mar 2022 14:06)  thiamine 100 mg oral tablet: 1 tab(s) orally once a day (01 Mar 2022 14:06)

## 2022-07-20 NOTE — H&P ADULT - ASSESSMENT
Patient is a 56M with a PMH of chronic ETOH abuse with hx of alcohol withdrawal and DTs with frequent hospital admissions, alcoholic gastritis/esophagitis, PUD, HLD, hx of hypoxic respiratory failure requiring intubation due to aspiration PNA who presents to the ED for abdominal pain and vomiting.  Patient recently received sedation in ED, unable to provide history.  Per ED staff, patient called EMS to his home - when they arrived he had altered mental status, unsteady gait, and slurred speech.  Reportedly more altered in the ED - presented as a code stroke.  Initial CT imaging negative.  Patient unable to tolerate PO in ED, noted to have signs of alcohol withdrawal and was given valium.  Vitals stable, labs benign.  Will admit to med surg.

## 2022-07-20 NOTE — H&P ADULT - PROBLEM SELECTOR PLAN 2
Presented as a code stroke  Consider full neuro exam when patient is awake  Patient with significantly high blood alcohol levels - stroke is less likely

## 2022-07-21 ENCOUNTER — TRANSCRIPTION ENCOUNTER (OUTPATIENT)
Age: 57
End: 2022-07-21

## 2022-07-21 VITALS
OXYGEN SATURATION: 98 % | HEART RATE: 77 BPM | TEMPERATURE: 98 F | SYSTOLIC BLOOD PRESSURE: 135 MMHG | DIASTOLIC BLOOD PRESSURE: 78 MMHG | RESPIRATION RATE: 18 BRPM

## 2022-07-21 LAB
ALBUMIN SERPL ELPH-MCNC: 3.4 G/DL — SIGNIFICANT CHANGE UP (ref 3.3–5)
ALP SERPL-CCNC: 53 U/L — SIGNIFICANT CHANGE UP (ref 40–120)
ALT FLD-CCNC: 34 U/L — SIGNIFICANT CHANGE UP (ref 12–78)
ANION GAP SERPL CALC-SCNC: 8 MMOL/L — SIGNIFICANT CHANGE UP (ref 5–17)
AST SERPL-CCNC: 44 U/L — HIGH (ref 15–37)
BILIRUB SERPL-MCNC: 0.9 MG/DL — SIGNIFICANT CHANGE UP (ref 0.2–1.2)
BUN SERPL-MCNC: 9 MG/DL — SIGNIFICANT CHANGE UP (ref 7–23)
CALCIUM SERPL-MCNC: 9 MG/DL — SIGNIFICANT CHANGE UP (ref 8.5–10.1)
CHLORIDE SERPL-SCNC: 103 MMOL/L — SIGNIFICANT CHANGE UP (ref 96–108)
CO2 SERPL-SCNC: 27 MMOL/L — SIGNIFICANT CHANGE UP (ref 22–31)
CREAT SERPL-MCNC: 1.09 MG/DL — SIGNIFICANT CHANGE UP (ref 0.5–1.3)
EGFR: 80 ML/MIN/1.73M2 — SIGNIFICANT CHANGE UP
GLUCOSE SERPL-MCNC: 91 MG/DL — SIGNIFICANT CHANGE UP (ref 70–99)
HCT VFR BLD CALC: 36.8 % — LOW (ref 39–50)
HGB BLD-MCNC: 11.6 G/DL — LOW (ref 13–17)
MAGNESIUM SERPL-MCNC: 1.7 MG/DL — SIGNIFICANT CHANGE UP (ref 1.6–2.6)
MCHC RBC-ENTMCNC: 23.3 PG — LOW (ref 27–34)
MCHC RBC-ENTMCNC: 31.5 G/DL — LOW (ref 32–36)
MCV RBC AUTO: 74 FL — LOW (ref 80–100)
NRBC # BLD: 0 /100 WBCS — SIGNIFICANT CHANGE UP (ref 0–0)
PHOSPHATE SERPL-MCNC: 2.5 MG/DL — SIGNIFICANT CHANGE UP (ref 2.5–4.5)
PLATELET # BLD AUTO: 171 K/UL — SIGNIFICANT CHANGE UP (ref 150–400)
POTASSIUM SERPL-MCNC: 3.4 MMOL/L — LOW (ref 3.5–5.3)
POTASSIUM SERPL-SCNC: 3.4 MMOL/L — LOW (ref 3.5–5.3)
PROT SERPL-MCNC: 7.2 GM/DL — SIGNIFICANT CHANGE UP (ref 6–8.3)
RBC # BLD: 4.97 M/UL — SIGNIFICANT CHANGE UP (ref 4.2–5.8)
RBC # FLD: 20.6 % — HIGH (ref 10.3–14.5)
SODIUM SERPL-SCNC: 138 MMOL/L — SIGNIFICANT CHANGE UP (ref 135–145)
WBC # BLD: 3.51 K/UL — LOW (ref 3.8–10.5)
WBC # FLD AUTO: 3.51 K/UL — LOW (ref 3.8–10.5)

## 2022-07-21 PROCEDURE — 99239 HOSP IP/OBS DSCHRG MGMT >30: CPT

## 2022-07-21 RX ORDER — SUCRALFATE 1 G
1 TABLET ORAL
Refills: 0 | Status: DISCONTINUED | OUTPATIENT
Start: 2022-07-21 | End: 2022-07-21

## 2022-07-21 RX ORDER — ACETAMINOPHEN 500 MG
650 TABLET ORAL EVERY 6 HOURS
Refills: 0 | Status: DISCONTINUED | OUTPATIENT
Start: 2022-07-21 | End: 2022-07-21

## 2022-07-21 RX ORDER — ONDANSETRON 8 MG/1
4 TABLET, FILM COATED ORAL EVERY 4 HOURS
Refills: 0 | Status: DISCONTINUED | OUTPATIENT
Start: 2022-07-21 | End: 2022-07-21

## 2022-07-21 RX ORDER — SIMVASTATIN 20 MG/1
1 TABLET, FILM COATED ORAL
Qty: 0 | Refills: 0 | DISCHARGE

## 2022-07-21 RX ORDER — PANTOPRAZOLE SODIUM 20 MG/1
40 TABLET, DELAYED RELEASE ORAL
Refills: 0 | Status: DISCONTINUED | OUTPATIENT
Start: 2022-07-21 | End: 2022-07-21

## 2022-07-21 RX ADMIN — Medication 1 GRAM(S): at 14:15

## 2022-07-21 RX ADMIN — Medication 650 MILLIGRAM(S): at 12:24

## 2022-07-21 RX ADMIN — PANTOPRAZOLE SODIUM 40 MILLIGRAM(S): 20 TABLET, DELAYED RELEASE ORAL at 17:22

## 2022-07-21 RX ADMIN — Medication 50 MILLIGRAM(S): at 01:06

## 2022-07-21 RX ADMIN — Medication 650 MILLIGRAM(S): at 13:16

## 2022-07-21 RX ADMIN — Medication 50 MILLIGRAM(S): at 14:15

## 2022-07-21 RX ADMIN — Medication 1 GRAM(S): at 17:18

## 2022-07-21 RX ADMIN — Medication 50 MILLIGRAM(S): at 06:04

## 2022-07-21 RX ADMIN — PANTOPRAZOLE SODIUM 40 MILLIGRAM(S): 20 TABLET, DELAYED RELEASE ORAL at 06:03

## 2022-07-21 RX ADMIN — Medication 100 MILLIGRAM(S): at 11:15

## 2022-07-21 RX ADMIN — Medication 1 MILLIGRAM(S): at 11:15

## 2022-07-21 NOTE — ED ADULT NURSE NOTE - AS SC BRADEN ACTIVITY
Spoke with mom and advised of the notes from Dr. Garcia Render- she will give the pt some benadryl and call back with an update (4) walks frequently

## 2022-07-21 NOTE — DISCHARGE NOTE NURSING/CASE MANAGEMENT/SOCIAL WORK - NSDCPEFALRISK_GEN_ALL_CORE
For information on Fall & Injury Prevention, visit: [email or web address] OR  [email or web address] OR  https://www.cdc.gov/steadi/patient.html

## 2022-07-21 NOTE — DISCHARGE NOTE NURSING/CASE MANAGEMENT/SOCIAL WORK - PATIENT PORTAL LINK FT
You can access the FollowMyHealth Patient Portal offered by [facility] by registering at the following website: [email or web address]. By joining [employer]’s FollowMyHealth portal, you will also be able to view your health information using other applications (apps) compatible with our system.

## 2022-07-21 NOTE — DISCHARGE NOTE NURSING/CASE MANAGEMENT/SOCIAL WORK - NSDCVIVACCINE_GEN_ALL_CORE_FT
COVID-19, mRNA, LNP-S, PF, 100 mcg/ 0.5 mL dose (Moderna); 10-Jovan-2021 15:38; Reji Hernandez (RN); [employer] [id number] (Exp. Date: 13-Jul-2021); IntraMuscular; Deltoid Right.; 0.5 milliLiter(s);   influenza, injectable, quadrivalent, preservative free; 31-Jan-2019 15:53; Ayaka Bynum (RN); [employer]; [id number] (Exp. Date: 30-Jun-2019); IntraMuscular; Deltoid Right.; 0.5 milliLiter(s); VIS (VIS Published: 07-Aug-2015, VIS Presented: 31-Jan-2019);   influenza, injectable, quadrivalent, preservative free; 10-Nov-2019 14:13; Laverne Lane (RN); [employer]; [id number] (Exp. Date: 30-Jun-2020); IntraMuscular; Deltoid Left.; 0.5 milliLiter(s); VIS (VIS Published: 15-Aug-2019, VIS Presented: 10-Nov-2019);   influenza, injectable, quadrivalent, preservative free; 13-Oct-2020 11:56; Kimberli Cronin (RN); [employer]; [id number] (Exp. Date: 30-Jun-2021); IntraMuscular; Deltoid Right.; 0.5 milliLiter(s); VIS (VIS Published: 15-Aug-2019, VIS Presented: 13-Oct-2020);   Td (adult) preservative free; 18-Jan-2020 20:04; Yulia Vance (RN); [place]; [id number] (Exp. Date: 19-Jan-2021); IntraMuscular; Deltoid Right.; 0.5 milliLiter(s); VIS (VIS Published: 18-Jan-2020, VIS Presented: 18-Jan-2020);

## 2022-07-21 NOTE — DISCHARGE NOTE PROVIDER - HOSPITAL COURSE
56M with a PMH of chronic ETOH abuse with hx of alcohol withdrawal and DTs with frequent hospital admissions, alcoholic gastritis/esophagitis, PUD, HLD, hx of hypoxic respiratory failure requiring intubation due to aspiration PNA who presents to the ED for abdominal pain vomiting, altered mental status, and concern for stroke.  Stroke protocol CT was performed:    CT PERFUSION demonstrated: No core infarct. 54 mL of active ischemia   suggested by RAPID AI, however there is extensive motion artifact.  If symptoms persist consider follow up head CT or MRI, MRA  if no   contraindication.    CTA COW:  Patent intracranial circulation without flow limiting stenosis.   Slightly decreased visualization of distal right MCA branches.    CTA NECK: Patent, ECAs, ICAs, no  hemodynamically significant stenosis at    ICA origins by NASCET criteria.  Bilateral vertebral arteries are patent without flow limiting stenosis.    Stroke was ruled out after presentation clarified, alcohol level of 411.  Neuro exam normalized after his intoxication resolved.  Started on librium taper.  The day after admission, patient noted his GI symptoms were feeling better, and wished to go home.  He was discharged with medication for alcoholic gastritis. He has received resources in the past should he wish to discontinue alcohol use.

## 2022-07-21 NOTE — DISCHARGE NOTE PROVIDER - ATTENDING DISCHARGE PHYSICAL EXAMINATION:
Vital Signs Last 24 Hrs  T(C): 36.8 (21 Jul 2022 11:50), Max: 37.2 (20 Jul 2022 23:42)  T(F): 98.2 (21 Jul 2022 11:50), Max: 98.9 (20 Jul 2022 23:42)  HR: 77 (21 Jul 2022 11:50) (67 - 77)  BP: 135/78 (21 Jul 2022 11:50) (132/90 - 135/78)  BP(mean): --  RR: 18 (21 Jul 2022 11:50) (18 - 18)  SpO2: 98% (21 Jul 2022 11:50) (97% - 98%)    Parameters below as of 21 Jul 2022 05:14  Patient On (Oxygen Delivery Method): room air    gen comfortable  neuro no tremor  abd mild pain to palpation, no rebound, +BS

## 2022-07-21 NOTE — DISCHARGE NOTE PROVIDER - NSDCMRMEDTOKEN_GEN_ALL_CORE_FT
pantoprazole 40 mg oral delayed release tablet: 1 tab(s) orally 2 times a day  Protonix 40 mg oral delayed release tablet: 1 tab(s) orally once a day   sucralfate 1 g oral tablet: 1 tab(s) orally 4 times a day  thiamine 100 mg oral tablet: 1 tab(s) orally once a day

## 2022-07-21 NOTE — DISCHARGE NOTE PROVIDER - NSDCCPCAREPLAN_GEN_ALL_CORE_FT
PRINCIPAL DISCHARGE DIAGNOSIS  Diagnosis: Alcoholic gastritis  Assessment and Plan of Treatment: take your carafate and protonix to help with the symptoms of abdominal discomfort      SECONDARY DISCHARGE DIAGNOSES  Diagnosis: Alcohol dependence with uncomplicated withdrawal  Assessment and Plan of Treatment: we discussed risks of ongoing alcohol use.  monitor for signs of alcohol withdrawal worsening and return to the hospital if these worsen

## 2022-07-26 DIAGNOSIS — F10.230 ALCOHOL DEPENDENCE WITH WITHDRAWAL, UNCOMPLICATED: ICD-10-CM

## 2022-07-26 DIAGNOSIS — E87.0 HYPEROSMOLALITY AND HYPERNATREMIA: ICD-10-CM

## 2022-07-26 DIAGNOSIS — K29.20 ALCOHOLIC GASTRITIS WITHOUT BLEEDING: ICD-10-CM
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
## 2022-07-27 NOTE — ED ADULT NURSE NOTE - NS ED NOTE ABUSE RESPONSE YN
86 y.o. M with PMH of thyroid ca s/p thyroidectomy in 2018, hyperthyroid, HTN, TIA (15-20 years ago), alzheimer's disease, and depression presented to [facility] ED due to L facial droop, L sided weakness and slurring of speech. LKN 22:00 hr on 7/15. Neuro exam fluctuating but remains with L hemiparesis and severe dysarthria. NIHSS 11 -> NIHSS 6 (does not know month, age, dysarthric, LUE drift, LUE ataxic). mRS 0. tPA administered 5.4 mg IV x1 @ 23:41-42 hr on 7/15. Bolus 49 mg started at 23:43  hr on 7/15. tPA administered > 30 min due to aggressive HTN management with IV medications. He was not a thrombectomy candidate due to no LVO    Impression: L hemiparesis and dysarthria due to acute ischemic stroke R MCA . Mechanism is unclear, Embolic secondary to ESUS vs. hypercoagulable state secondary to COVID 19+     NEURO: No obvious aphasia appears apathetic, Neurologically without change, will d/c Seroquel PRN dose due to increased lethargy and will start ativan 0.5mg PRN for agitation.  CTH on 7/22 without change, Continue close monitoring for neurologic deterioration, permissive HTN followed by gradual normotension, HgA1C 6.1 %, - continue high intensity statin,  Lidocaine patches PRN for back pain. Physical therapy/OT appreciated and recommended AR. SLP gerda appreciated.     ANTITHROMBOTIC THERAPY:  Eliquis given elevated D-Dimer (2086) hypercoagulable state secondary to Covid infection, switch to ASA on 08/15    PULMONARY:  CXR (07/18): No focal infiltrates. No pleural effusion or pneumothorax, will obtain repeat CXR and encourage incentive spirometer protecting airway, saturating well     CARDIOVASCULAR: TTE shows EF70%, unremarkable, cardiac monitoring: no events, continue Amlodipine to 10 mg daily and Metoprolol ER 50mg daily for BP management. Will need ILR to screen for occult arrythmia prior to discharge.                              SBP goal: 110-160mmHg    GASTROINTESTINAL: passed dysphagia screen , aspiration precautions, s/p MBS on 7/18 with recommendations for minced and moist, seen on 7/21 and diet upgraded, tolerating well      Diet: regular solids with thin liquids    RENAL: s/p straight cath on 07/17 due limited urine output. good urine output, Hyponatremia Will continue to monitor closely.      Na Goal: Greater than 135     Rawls: No    HEMATOLOGY: Will likely need anticoagulation for 1 month due to elevated D-Dimer. LE doppler on 07/19 and on 07/24: negative for DVT     DVT ppx: no need as pt is on Eliquis    ENDO: h/o thyroid CA s/p thyroidectomy, postsurgical hypothyroidism: will continue Levothyroxine 100 mcg daily    ID: Covid +, afebrile, ID following: if fever will send blood cultures, Pt is on airborne/contact precautions, no indication for antivirals.    OTHER:  Depression, Dementia with sundowning at baseline. Restarted home meds including Seroquel 25 mg q hs, Mirtazepine 7.5 mg qhs, Donepezil 10 mg q hs, Namenda 10 mg BID. Agitated on 07/19/22 better with current medication regimen, no longer on restraints. Psych consult appreciated, Plan/goals  of care discussed with pt's daughter, Jordyn.     DISPOSITION: Acute Rehab once completed covid quarantine    CORE MEASURES:        Admission NIHSS: 11     TPA: YES       LDL/HDL: 108/61     Depression Screen:  P     Statin Therapy: Atorvastatin     Dysphagia Screen: PASS     Smoking  NO      Afib  NO     Stroke Education YES    Obtain screening lower extremity venous ultrasound in patients who meet 1 or more of the following criteria as patient is high risk for DVT/PE on admission:   [] History of DVT/PE  []Hypercoagulable states (Factor V Leiden, Cancer, OCP, etc. )  []Prolonged immobility (hemiplegia/hemiparesis/post operative or any other extended immobilization)  [] Transferred from outside facility (Rehab or Long term care)  [] Age </= to 50 86 y.o. M with PMH of thyroid ca s/p thyroidectomy in 2018, hyperthyroid, HTN, TIA (15-20 years ago), alzheimer's disease, and depression presented to [facility] ED due to L facial droop, L sided weakness and slurring of speech. LKN 22:00 hr on 7/15. Neuro exam fluctuating but remains with L hemiparesis and severe dysarthria. NIHSS 11 -> NIHSS 6 (does not know month, age, dysarthric, LUE drift, LUE ataxic). mRS 0. tPA administered 5.4 mg IV x1 @ 23:41-42 hr on 7/15. Bolus 49 mg started at 23:43  hr on 7/15. tPA administered > 30 min due to aggressive HTN management with IV medications. He was not a thrombectomy candidate due to no LVO    Impression: L hemiparesis and dysarthria due to acute ischemic stroke R MCA . Mechanism is unclear, Embolic secondary to ESUS vs. hypercoagulable state secondary to COVID 19+     NEURO: Neuro exam unchanged, No obvious aphasia appears apathetic, Pt on ativan 0.5mg PRN for agitation.  Continue close monitoring for neurologic deterioration, permissive HTN followed by gradual normotension, HgA1C 6.1 %, - continue high intensity statin,  Lidocaine patches PRN for back pain. Physical therapy/OT appreciated and recommended AR. SLP gerda appreciated.     ANTITHROMBOTIC THERAPY:  Eliquis given elevated D-Dimer (2086) hypercoagulable state secondary to Covid infection, switch to ASA on 08/15    PULMONARY:  CXR (07/18): No focal infiltrates. No pleural effusion or pneumothorax, will obtain repeat CXR and encourage incentive spirometer protecting airway, saturating well     CARDIOVASCULAR: TTE shows EF70%, unremarkable, cardiac monitoring: no events, continue Amlodipine to 10 mg daily and Metoprolol ER 50mg daily for BP management. Will need ILR to screen for occult arrythmia prior to discharge.                              SBP goal: 110-160mmHg    GASTROINTESTINAL: passed dysphagia screen , aspiration precautions, s/p MBS on 7/18 with recommendations for minced and moist, seen on 7/71 and diet upgraded, to regular      Diet: regular solids with thin liquids    RENAL: s/p straight cath on 07/17 due limited urine output. good urine output, Hyponatremia Will continue to monitor closely.      Na Goal: Greater than 135     Rawls: No    HEMATOLOGY: Will likely need anticoagulation for 1 month due to elevated D-Dimer. LE doppler on 07/19 and on 07/24: negative for DVT     DVT ppx: no need as pt is on Eliquis    ENDO: h/o thyroid CA s/p thyroidectomy, postsurgical hypothyroidism: will continue Levothyroxine 100 mcg daily    ID: Covid-19 +, afebrile, ID following    OTHER:  Depression, Dementia with sundowning at baseline. Restarted home meds including Seroquel 25 mg q hs, Mirtazepine 7.5 mg qhs, Donepezil 10 mg q hs, Namenda 10 mg BID. Agitated on 07/19/22 better with current medication regimen, no longer on restraints. Psych consult appreciated, Plan/goals  of care discussed with pt's daughter, Jordyn.     DISPOSITION: Acute Rehab today    CORE MEASURES:        Admission NIHSS: 11     TPA: YES       LDL/HDL: 108/61     Depression Screen:  P     Statin Therapy: Atorvastatin     Dysphagia Screen: PASS     Smoking  NO      Afib  NO     Stroke Education YES    Obtain screening lower extremity venous ultrasound in patients who meet 1 or more of the following criteria as patient is high risk for DVT/PE on admission:   [] History of DVT/PE  []Hypercoagulable states (Factor V Leiden, Cancer, OCP, etc. )  []Prolonged immobility (hemiplegia/hemiparesis/post operative or any other extended immobilization)  [] Transferred from outside facility (Rehab or Long term care)  [] Age </= to 50 86 y.o. M with PMH of thyroid ca s/p thyroidectomy in 2018, hyperthyroid, HTN, TIA (15-20 years ago), alzheimer's disease, and depression presented to [facility] ED due to L facial droop, L sided weakness and slurring of speech. LKN 22:00 hr on 7/15. Neuro exam fluctuating but remains with L hemiparesis and severe dysarthria. NIHSS 11 -> NIHSS 6 (does not know month, age, dysarthric, LUE drift, LUE ataxic). mRS 0. tPA administered 5.4 mg IV x1 @ 23:41-42 hr on 7/15. Bolus 49 mg started at 23:43  hr on 7/15. tPA administered > 30 min due to aggressive HTN management with IV medications. He was not a thrombectomy candidate due to no LVO    Impression: L hemiparesis and dysarthria due to acute ischemic stroke R MCA . Mechanism is unclear, Embolic secondary to ESUS vs. hypercoagulable state secondary to COVID 19+     NEURO: Neuro exam unchanged, No obvious aphasia but appears apathetic, Pt on ativan 0.5mg PRN for agitation.  Continue close monitoring for neurologic deterioration, permissive HTN followed by gradual normotension, HgA1C 6.1 %, - continue high intensity statin,  Lidocaine patches PRN for back pain. Physical therapy/OT appreciated and recommended AR. SLP gerda appreciated.     ANTITHROMBOTIC THERAPY:  Eliquis given elevated D-Dimer (2086) hypercoagulable state secondary to Covid infection, switch to ASA on 08/15    PULMONARY:  CXR (07/18): No focal infiltrates. No pleural effusion or pneumothorax, will obtain repeat CXR and encourage incentive spirometer protecting airway, saturating well     CARDIOVASCULAR: TTE shows EF70%, unremarkable, cardiac monitoring: no events, continue Amlodipine to 10 mg daily and Metoprolol ER 50mg daily for BP management. Will need ILR to screen for occult arrythmia prior to discharge.                              SBP goal: 110-160mmHg    GASTROINTESTINAL: passed dysphagia screen , aspiration precautions, s/p MBS on 7/18 with recommendations for minced and moist, seen on 7/71 and diet upgraded, to regular      Diet: regular solids with thin liquids    RENAL: s/p straight cath on 07/17 due limited urine output. good urine output, Hyponatremia Will continue to monitor closely.      Na Goal: Greater than 135     Rawls: No    HEMATOLOGY: Will likely need anticoagulation for 1 month due to elevated D-Dimer. LE doppler on 07/19 and on 07/24: negative for DVT     DVT ppx: no need as pt is on Eliquis    ENDO: h/o thyroid CA s/p thyroidectomy, postsurgical hypothyroidism: will continue Levothyroxine 100 mcg daily    ID: Covid-19 +, afebrile, ID following    OTHER:  Depression, Dementia with sundowning at baseline. Restarted home meds including Seroquel 25 mg q hs, Mirtazepine 7.5 mg qhs, Donepezil 10 mg q hs, Namenda 10 mg BID. Agitated on 07/19/22 better with current medication regimen, no longer on restraints. Psych consult appreciated, Plan/goals  of care discussed with pt's daughter, Jordyn.     DISPOSITION: Acute Rehab today    CORE MEASURES:        Admission NIHSS: 11     TPA: YES       LDL/HDL: 108/61     Depression Screen:  P     Statin Therapy: Atorvastatin     Dysphagia Screen: PASS     Smoking  NO      Afib  NO     Stroke Education YES    Obtain screening lower extremity venous ultrasound in patients who meet 1 or more of the following criteria as patient is high risk for DVT/PE on admission:   [] History of DVT/PE  []Hypercoagulable states (Factor V Leiden, Cancer, OCP, etc. )  []Prolonged immobility (hemiplegia/hemiparesis/post operative or any other extended immobilization)  [] Transferred from outside facility (Rehab or Long term care)  [] Age </= to 50 Yes

## 2022-08-09 NOTE — PATIENT PROFILE ADULT - NSPROMEDSADMININFO_GEN_A_NUR
no concerns Propranolol Pregnancy And Lactation Text: This medication is Pregnancy Category C and it isn't known if it is safe during pregnancy. It is excreted in breast milk.

## 2022-08-12 NOTE — ED ADULT TRIAGE NOTE - MEANS OF ARRIVAL
Last Appointment:  6/6/2022  Future Appointments   Date Time Provider Aba Landa   12/5/2022  8:20 AM Cristina Dumont  [address]
ambulatory
COUGH

## 2022-08-13 NOTE — H&P ADULT - NSHPRISKHIVSCREEN_GEN_ALL_CORE
Instill antibiotic drops as prescribed.  Avoid getting water into ear.  Take ibuprofen as prescribed for pain.  Complete antibiotics as prescribed by mouth.  Follow up with PCP or ENT if symptoms persist or worsen.     Not applicable (known HIV negative status in last year)

## 2022-08-19 NOTE — ED PROVIDER NOTE - NS PRO PASSIVE SMOKE EXP
Pediatric Medications for Fever and Pain Relief:    Acetaminophen (Tylenol and other brands):   Appropriate acetaminophen dose for your child's weight.   Doses may be given every 4 hours as needed no more than 5 times per day.       Your child's weight   Children's Liquid Solution and Suspension (160 mg per 5 mL) Children's Chewable and Disintegrating Tablets (1 tablet = 80 mg) Jr. Strength Chewable and Disintegrating Tablets (1 tablet = 160 mg)   6-11 pounds 1.25 mL Not recommended Not recommended   12-17 pounds 2.5 mL Not recommended Not recommended   18-23 pounds 3.75 mL Not recommended Not recommended   24-35 pounds 5 mL 2 tablets 1 tablet   36-47 pounds 7.5 mL 3 tablets 1-1/2 tablets   48-59 pounds 10 mL 4 tablets 2 tablets   60-71 pounds 12.5 mL 5 tablets 2-1/2 tablets   72-95 pounds 15 mL 6 tablets 3 tablets   More than 96 pounds Not recommended Not recommended 4 tablets       Pediatric Medications for Fever and Pain Relief:    Ibuprofen (Motrin, Advil and other brands):   Appropriate ibuprofen dose for your child's weight.   Not recommended for infants under 6 months of age.  Doses may be given every 6 hours as needed.       Your child's weight Infant Oral Suspension (50 mg per 1.25 mL)   12-17 pounds 50 mg (1.25 ml)   18-23 pounds 75 mg (1.875 ml)         Your child's weight Children's Oral Suspension (100 mg per 5 mL)   50 mg Chewable Tablet   100 mg Chewable Tablet   12-17 pounds 2.5 mL Not recommended Not recommended   18-23 pounds 3.75 mL Not recommended Not recommended   24-35 pounds 5 mL 2 tablets 1 tablet   36-47 pounds 7.5 mL 3 tablets 1-1/2 tablets   48-59 pounds 10 mL 4 tablets 2 tablets   60-71 pounds 12.5 mL 5 tablets 2-1/2 tablets   72-95 pounds 15 mL 6 tablets 3 tablets        Unknown
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
## 2022-08-24 NOTE — ED ADULT TRIAGE NOTE - TEMPERATURE IN CELSIUS (DEGREES C)
37.1 Additional Notes: Hibiclens and mupirocin sent to pharmacy due to history of MRSA infection of site on back in the past and is concerned about surgical site infection Render Risk Assessment In Note?: no Detail Level: Simple

## 2022-08-29 NOTE — DISCHARGE NOTE ADULT - MEDICATION SUMMARY - MEDICATIONS TO CHANGE
Applied
I will SWITCH the dose or number of times a day I take the medications listed below when I get home from the hospital:  None
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
## 2022-09-07 NOTE — DISCHARGE NOTE PROVIDER - REASON FOR ADMISSION
Intubation    Date/Time: 9/7/2022 4:05 PM  Performed by: Anthony Delgadillo CRNA  Authorized by: Anthony Delgadillo CRNA     Intubation:     Induction:  Intravenous    Intubated:  Postinduction    Mask Ventilation:  Easy mask    Attempts:  1    Attempted By:  CRNA    Method of Intubation:  Video laryngoscopy    Blade:  Arevalo 3    Laryngeal View Grade: Grade I - full view of cords      Difficult Airway Encountered?: No      Complications:  None    Airway Device:  Oral endotracheal tube    Airway Device Size:  7.5    Style/Cuff Inflation:  Cuffed (inflated to minimal occlusive pressure)    Tube secured:  22    Secured at:  The lips    Placement Verified By:  Capnometry and Revisualization with laryngoscopy    Complicating Factors:  None    Findings Post-Intubation:  BS equal bilateral     Alcohol Withdrawal

## 2022-09-16 NOTE — ED ADULT TRIAGE NOTE - HEART RATE (BEATS/MIN)
Patient : Becky Doss Age: 62 year old Sex: female   MRN: [id number] Encounter Date: 9/16/2022      History     Chief Complaint   Patient presents with   • Musculoskeletal Problem     HPI limited due to patient being nonverbal   HPI per EMS and Pt's caregiver   HPI   Becky Doss is a 62 year old female with past medical history of autistic disorder, nonverbal, and seizure disorder who presents with episode of non-responsive today.  Patient's caregiver reports at roughly 0800 while the patient was eating she had a episode where she was described as being slumped over and acting abnormal.  Patient's living facility reports concerned the patient had a brief seizure.  Patient's caregiver reports concerned the patient possibly was choking.  Patient's caregiver reports prior to today's episode, the patient has been acting normal and not having any symptoms.  Patient's caregiver reports the patient currently appears at her baseline and is acting normal for her.  The patient has not had any fevers, vomiting, diarrhea, coughing, difficulty breathing, or any other symptoms.       No Known Allergies    Current Discharge Medication List      Prior to Admission Medications    Details   budesonide-formoterol (Symbicort) 80-4.5 MCG/ACT inhaler Inhale 2 puffs into the lungs in the morning and 2 puffs in the evening.  Qty: 10.2 each, Refills: 1      cetirizine (ZyrTEC) 10 MG tablet Take 1 tablet by mouth at bedtime  Qty: 28 tablet, Refills: 3      Calcium Carb-Cholecalciferol (Calcium-Vitamin D3) 250-125 MG-UNIT Tab Take 1 tablet by mouth in the morning  Qty: 28 tablet, Refills: 3      omeprazole (PriLOSEC) 40 MG capsule Take 1 capsule by mouth in the morning  Qty: 28 capsule, Refills: 3      oxybutynin (DITROPAN-XL) 10 MG 24 hr tablet Take 2 tablets (20mg) by mouth in the morning  Qty: 56 tablet, Refills: 3      montelukast (SINGULAIR) 10 MG tablet Take 1 tablet by mouth at bedtime  Qty: 28 tablet, Refills: 3      docusate  sodium (COLACE) 100 MG capsule Take 1 capsule by mouth in the morning  Qty: 28 capsule, Refills: 3      Multiple Vitamins-Iron (Tab-A-Edith/Iron) Tab Take 1 tablet by mouth in the morning  Qty: 28 tablet, Refills: 3      clonazePAM (KlonoPIN) 1 MG tablet Take 3 tablets by mouth at bedtime.  Qty: 90 tablet, Refills: 5      benztropine (COGENTIN) 1 MG tablet Take 1 tablet by mouth 2 times a day  Qty: 56 tablet, Refills: 3      Calcium Antacid 500 MG chewable tablet Chew & swallow 1 tablet by mouth 3 times a day in the morning, noon and evening  Qty: 84 tablet, Refills: 3      propRANolol (INDERAL) 10 MG tablet Take 1 tablet by mouth in the morning  Qty: 28 tablet, Refills: 3      clonazePAM (KlonoPIN) 0.5 MG tablet TAKE ONE TABLET BY MOUTH 3 TIMES A DAY AT 8AM NOON AND 4PM  Qty: 90 tablet, Refills: 5      omeprazole 20 MG tablet Take 1 tablet by mouth nightly.  Qty: 28 tablet, Refills: 11      alendronate (FOSAMAX) 35 MG tablet Take 1 tablet by mouth in the morning before meal(s) on mondays  Qty: 4 tablet, Refills: 8      QUEtiapine (SEROquel) 100 MG tablet Take 1 tablet by mouth 3 times daily as needed (agitation/restlessness/insomnia).  Qty: 30 tablet, Refills: 2      Spacer/Aero-Holding Chambers Device USE WITH ALBUTEROL INHALER  Qty: 1 each, Refills: 1      QUEtiapine (SEROquel) 200 MG tablet Take 0.5 tab by mouth daily at 4pm and 1.5 tabs at bedtime  Qty: 60 tablet, Refills: 11      hydroCORTisone (Proctozone-HC) 2.5 % rectal cream Place 1 application rectally 2 times daily as needed for Hemorrhoids.  Qty: 30 g, Refills: 3      White Petrolatum-Mineral Oil (Systane Nighttime) Ointment Apply to left eye 4 times a day as needed  Qty: 3.5 g, Refills: 3      risperiDONE (RisperDAL) 4 MG tablet Take 1 tablet by mouth nightly.  Qty: 30 tablet, Refills: 11      doxepin (SINEquan) 50 MG capsule Take 1 capsule by mouth at bedtime.  Qty: 28 capsule, Refills: 11      LORazepam (ATIVAN) 1 MG tablet 1-2 tabs prn 30 minutes  prior to dental appointments for anxiety  Qty: 2 tablet, Refills: 1      carbamide peroxide (DEBROX) 6.5 % otic solution Place 5 drops into both ears 3 days a week.  Qty: 15 mL, Refills: 3      Mouthwashes (Listerine) Liquid Use with toothette to clean mouth daily prn  Qty: 1000 mL, Refills: 11      albuterol (ProAir HFA) 108 (90 Base) MCG/ACT inhaler Take two puffs q4 hr prn for wheezing or shortness of breath at group home/day program. Also to be used prior to walks at day program.  Qty: 18 g, Refills: 2      ibuprofen (MOTRIN) 600 MG tablet Take 1 tablet by mouth every 6 hours as needed for Pain or Fever.  Qty: 30 tablet, Refills: 11      hydroCORTisone (Procto-Med HC) 2.5 % rectal cream Apply rectally twice daily as needed.  Qty: 30 g, Refills: 11      polyethylene glycol (MIRALAX) 17 GM/SCOOP powder Take 17 g by mouth every other day. Stir and dissolve powder in any 8 ounces of beverage, then drink.  Qty: 238 g, Refills: 11      acetaminophen (TYLENOL) 325 MG tablet Take 2 tablets by mouth every 6 hours as needed for Pain.  Qty: 60 tablet, Refills: 3      witch hazel-glycerin (Medi Pads) pad Place rectally as needed for Pain.  Qty: 100 each, Refills: 3      carBAMazepine (TEGretol) 200 MG tablet Take 2 tablets (400mg) by mouth in the morning and take 3 tablets (600mg) at bedtime  Qty: 140 tablet, Refills: 11      Myrbetriq 50 MG 24 hr tablet Take 1 tablet by mouth in the morning  Qty: 90 tablet, Refills: 3      nitrofurantoin, macrocrystal-monohydrate, (MACROBID) 100 MG capsule Take 1 capsule by mouth in the morning  Qty: 90 capsule, Refills: 3      hydroCORTisone (CORTIZONE) 1 % cream APPLY RECTALLY TWICE A DAY AS NEEDED  Qty: 28.4 g, Refills: 1             Past Medical History:   Diagnosis Date   • Autistic disorder, current or active state    • Dysphagia    • GERD (gastroesophageal reflux disease)    • Hemorrhoids    • Malignant neoplasm of other specified sites of female breast 09/01/2002    Well  differentiated Infiltrating ductal carcinoma of the right breast, upper uter quadrant. s/p modified radical mastectomy 1/20/2003.   • Post-menopausal bleeding    • RAD (reactive airway disease)    • SEIZURE DISORDER, NOS    • Severe intellectual disabilities        Past Surgical History:   Procedure Laterality Date   • CYSTOSTOMY  03/2017   • EYE SURGERY Left 04/04/2017   • EYE SURGERY Right 10/04/2018   • HYSTEROSCOPY,DIAGNOSTIC  2009    [facility]   • MAMMOGRAM SCREEN UNILATERAL  05/09/2009    Left breast benign findings.   • MASTEC,MOD RADICAL  01/20/2003     right Mastectomy (modified radical)   • SERVICE TO GASTROENTEROLOGY  10/10/2012    colonoscopy       Family History   Problem Relation Age of Onset   • Other Other         family medical hx unknown   • Patient is unaware of any medical problems Mother        Social History     Tobacco Use   • Smoking status: Never Smoker   • Smokeless tobacco: Never Used   Vaping Use   • Vaping Use: never used   Substance Use Topics   • Alcohol use: No   • Drug use: Never       E-cigarette/Vaping   • E-Cigarette/Vaping Use Never Used      E-Cigarette/Vaping Substances & Devices       Review of Systems   Unable to perform ROS: Patient nonverbal   Constitutional:        Episode of non-responsive       Physical Exam     ED Triage Vitals [09/16/22 0852]   ED Triage Vitals Group      Temp 98 °F (36.7 °C)      Heart Rate 89      Resp 16      BP (!) 149/90      SpO2 92 %      EtCO2 mmHg       Height       Weight 202 lb (91.6 kg)      Weight Scale Used       BMI (Calculated)       IBW/kg (Calculated)        Physical Exam  Vitals and nursing note reviewed.   Constitutional:       General: She is not in acute distress.     Appearance: Normal appearance. She is not ill-appearing or toxic-appearing.   HENT:      Head: Normocephalic and atraumatic.      Right Ear: Tympanic membrane, ear canal and external ear normal.      Left Ear: Tympanic membrane, ear canal and external ear normal.       Nose: Nose normal.      Mouth/Throat:      Mouth: Mucous membranes are dry.      Pharynx: Oropharynx is clear. No oropharyngeal exudate or posterior oropharyngeal erythema.   Eyes:      General:         Right eye: No discharge.         Left eye: No discharge.      Extraocular Movements: Extraocular movements intact.      Conjunctiva/sclera: Conjunctivae normal.      Pupils: Pupils are equal, round, and reactive to light.   Cardiovascular:      Rate and Rhythm: Normal rate and regular rhythm.      Pulses: Normal pulses.      Heart sounds: Normal heart sounds. No murmur heard.    No friction rub. No gallop.   Pulmonary:      Effort: Pulmonary effort is normal. No respiratory distress.      Breath sounds: Normal breath sounds. No wheezing, rhonchi or rales.   Chest:      Chest wall: No tenderness.   Abdominal:      General: Abdomen is flat. Bowel sounds are normal. There is no distension.      Palpations: Abdomen is soft.      Tenderness: There is no abdominal tenderness. There is no right CVA tenderness, left CVA tenderness, guarding or rebound.   Musculoskeletal:         General: No tenderness or deformity. Normal range of motion.      Cervical back: Normal range of motion and neck supple. No rigidity or tenderness.      Right lower leg: No edema.      Left lower leg: No edema.   Lymphadenopathy:      Cervical: No cervical adenopathy.   Skin:     General: Skin is warm and dry.      Capillary Refill: Capillary refill takes less than 2 seconds.      Findings: No rash.   Neurological:      Mental Status: She is alert.      Cranial Nerves: No dysarthria or facial asymmetry.      Sensory: Sensation is intact. No sensory deficit.      Motor: Motor function is intact. No weakness, tremor or seizure activity.      Comments: Nonverbal         ED Course     Procedures    Lab Results     Results for orders placed or performed during the hospital encounter of 09/16/22   Comprehensive Metabolic Panel   Result Value Ref Range     Fasting Status      Sodium 136 135 - 145 mmol/L    Potassium 3.7 3.4 - 5.1 mmol/L    Chloride 101 97 - 110 mmol/L    Carbon Dioxide 29 21 - 32 mmol/L    Anion Gap 10 7 - 19 mmol/L    Glucose 151 (H) 70 - 99 mg/dL    BUN 29 (H) 6 - 20 mg/dL    Creatinine 0.49 (L) 0.51 - 0.95 mg/dL    Glomerular Filtration Rate >90 >=60    BUN/ Creatinine Ratio 59 (H) 7 - 25    Calcium 9.8 8.4 - 10.2 mg/dL    Bilirubin, Total 0.2 0.2 - 1.0 mg/dL    GOT/AST 19 <=37 Units/L    GPT/ALT 23 <64 Units/L    Alkaline Phosphatase 120 (H) 45 - 117 Units/L    Albumin 3.6 3.6 - 5.1 g/dL    Protein, Total 7.7 6.4 - 8.2 g/dL    Globulin 4.1 (H) 2.0 - 4.0 g/dL    A/G Ratio 0.9 (L) 1.0 - 2.4   Magnesium   Result Value Ref Range    Magnesium 1.8 1.7 - 2.4 mg/dL   Lactic Acid, Venous   Result Value Ref Range    Lactate, Venous 2.5 (HH) 0.0 - 2.0 mmol/L   Creatine Kinase   Result Value Ref Range    CK 61 26 - 192 Units/L   Carbamazepine   Result Value Ref Range    Carbamazepine 3.3 (L) 4.0 - 12.0 mcg/mL   CBC with Automated Differential (performable only)   Result Value Ref Range    WBC 8.0 4.2 - 11.0 K/mcL    RBC 4.26 4.00 - 5.20 mil/mcL    HGB 13.4 12.0 - 15.5 g/dL    HCT 40.3 36.0 - 46.5 %    MCV 94.6 78.0 - 100.0 fl    MCH 31.5 26.0 - 34.0 pg    MCHC 33.3 32.0 - 36.5 g/dL    RDW-CV 11.7 11.0 - 15.0 %    RDW-SD 41.0 39.0 - 50.0 fL     140 - 450 K/mcL    NRBC 0 <=0 /100 WBC    Neutrophil, Percent 73 %    Lymphocytes, Percent 18 %    Mono, Percent 6 %    Eosinophils, Percent 1 %    Basophils, Percent 1 %    Immature Granulocytes 1 %    Absolute Neutrophils 5.9 1.8 - 7.7 K/mcL    Absolute Lymphocytes 1.5 1.0 - 4.0 K/mcL    Absolute Monocytes 0.5 0.3 - 0.9 K/mcL    Absolute Eosinophils  0.1 0.0 - 0.5 K/mcL    Absolute Basophils 0.0 0.0 - 0.3 K/mcL    Absolute Immmature Granulocytes 0.1 0.0 - 0.2 K/mcL       EKG Results     EKG Interpretation  Rate: 86  Rhythm: normal sinus rhythm   Abnormality: yes; nonspecific T-wave abnormality    EKG tracing  interpreted by ED physician    Radiology Results     Imaging Results          XR CHEST AP OR PA - PORTABLE (Final result)  Result time 09/16/22 10:43:27    Final result                 Impression:    IMPRESSION:    No significant acute change.                   Narrative:    EXAM: XR CHEST AP OR PA    CLINICAL HISTORY: ?aspiration    COMPARISON: 10/3/2021    FINDINGS: The cardiac silhouette remains at the upper limits of normal in  size to mildly enlarged.    Vascularity is normal.    Minimal interstitial prominence of the left base which could be due to poor  inspiration.    No acute consolidations or effusions are present.                                ED Medication Orders (From admission, onward)    Ordered Start     Status Ordering Provider    09/16/22 1041 09/16/22 1042  sodium chloride (NORMAL SALINE) 0.9 % bolus 1,000 mL  ONCE         Last MAR action: ELVA Meyers               [facility]   62 year old female with past medical history of autistic disorder, nonverbal, and seizure disorder who presents with episode of non-responsive today. PEx per above.    Chest XRay negative for pneumonia, pulmonary edema, pneumothorax, wide mediastinum, or other acute abnormalities.  CBC and metabolic panel unremarkable.  Magnesium level normal.  CK normal at 61.  Lactate elevated at 2.5.  Patient afebrile, normal 8.0; no evidence of infection based on patient's presentation, exam, and workup.   Pt Tx in ED with 1L NS.  Patient observed in ED without having any seizure-like activity or evidence of difficulty breathing/choking.    Patient reassessed - Patient well-appearing and vital signs reassuring. Discussed with Patient's caregiver ED findings and plan for discharge. Patient's caregiver was given ED warnings, discharge instructions, and follow up information to go home with. Patient's caregiver understands and agrees with plan for discharge. Any questions have been answered. Patient discharged in good  condition.    Clinical Impression     ED Diagnosis   1. Transient alteration of awareness     2. Dehydration         Disposition        Discharge after Treatment 9/16/2022 11:30 AM  There is no comment                     Nasim Griffith,   09/16/22 1229     110

## 2022-09-22 ENCOUNTER — INPATIENT (INPATIENT)
Facility: HOSPITAL | Age: 57
LOS: 0 days | Discharge: HOME | End: 2022-09-23
Attending: STUDENT IN AN ORGANIZED HEALTH CARE EDUCATION/TRAINING PROGRAM | Admitting: STUDENT IN AN ORGANIZED HEALTH CARE EDUCATION/TRAINING PROGRAM

## 2022-09-22 VITALS
SYSTOLIC BLOOD PRESSURE: 132 MMHG | TEMPERATURE: 98 F | WEIGHT: 169.98 LBS | RESPIRATION RATE: 18 BRPM | HEIGHT: 67 IN | HEART RATE: 100 BPM | OXYGEN SATURATION: 98 % | DIASTOLIC BLOOD PRESSURE: 89 MMHG

## 2022-09-22 DIAGNOSIS — F10.20 ALCOHOL DEPENDENCE, UNCOMPLICATED: ICD-10-CM

## 2022-09-22 DIAGNOSIS — E87.6 HYPOKALEMIA: ICD-10-CM

## 2022-09-22 DIAGNOSIS — R11.2 NAUSEA WITH VOMITING, UNSPECIFIED: ICD-10-CM

## 2022-09-22 DIAGNOSIS — K29.00 ACUTE GASTRITIS WITHOUT BLEEDING: ICD-10-CM

## 2022-09-22 LAB
ALBUMIN SERPL ELPH-MCNC: 3.1 G/DL — LOW (ref 3.3–5)
ALBUMIN SERPL ELPH-MCNC: 3.7 G/DL — SIGNIFICANT CHANGE UP (ref 3.3–5)
ALP SERPL-CCNC: 56 U/L — SIGNIFICANT CHANGE UP (ref 40–120)
ALP SERPL-CCNC: 60 U/L — SIGNIFICANT CHANGE UP (ref 40–120)
ALT FLD-CCNC: 26 U/L — SIGNIFICANT CHANGE UP (ref 12–78)
ALT FLD-CCNC: 33 U/L — SIGNIFICANT CHANGE UP (ref 12–78)
ANION GAP SERPL CALC-SCNC: 12 MMOL/L — SIGNIFICANT CHANGE UP (ref 5–17)
ANION GAP SERPL CALC-SCNC: 9 MMOL/L — SIGNIFICANT CHANGE UP (ref 5–17)
APTT BLD: 26.8 SEC — LOW (ref 27.5–35.5)
AST SERPL-CCNC: 26 U/L — SIGNIFICANT CHANGE UP (ref 15–37)
AST SERPL-CCNC: 32 U/L — SIGNIFICANT CHANGE UP (ref 15–37)
BASOPHILS # BLD AUTO: 0.05 K/UL — SIGNIFICANT CHANGE UP (ref 0–0.2)
BASOPHILS NFR BLD AUTO: 0.8 % — SIGNIFICANT CHANGE UP (ref 0–2)
BILIRUB DIRECT SERPL-MCNC: 0.2 MG/DL — SIGNIFICANT CHANGE UP (ref 0–0.3)
BILIRUB INDIRECT FLD-MCNC: 0.5 MG/DL — SIGNIFICANT CHANGE UP (ref 0.2–1)
BILIRUB SERPL-MCNC: 0.4 MG/DL — SIGNIFICANT CHANGE UP (ref 0.2–1.2)
BILIRUB SERPL-MCNC: 0.7 MG/DL — SIGNIFICANT CHANGE UP (ref 0.2–1.2)
BUN SERPL-MCNC: 14 MG/DL — SIGNIFICANT CHANGE UP (ref 7–23)
BUN SERPL-MCNC: 15 MG/DL — SIGNIFICANT CHANGE UP (ref 7–23)
CALCIUM SERPL-MCNC: 8.2 MG/DL — LOW (ref 8.5–10.1)
CALCIUM SERPL-MCNC: 8.8 MG/DL — SIGNIFICANT CHANGE UP (ref 8.5–10.1)
CHLORIDE SERPL-SCNC: 108 MMOL/L — SIGNIFICANT CHANGE UP (ref 96–108)
CHLORIDE SERPL-SCNC: 109 MMOL/L — HIGH (ref 96–108)
CO2 SERPL-SCNC: 22 MMOL/L — SIGNIFICANT CHANGE UP (ref 22–31)
CO2 SERPL-SCNC: 24 MMOL/L — SIGNIFICANT CHANGE UP (ref 22–31)
CREAT SERPL-MCNC: 0.91 MG/DL — SIGNIFICANT CHANGE UP (ref 0.5–1.3)
CREAT SERPL-MCNC: 1.04 MG/DL — SIGNIFICANT CHANGE UP (ref 0.5–1.3)
EGFR: 100 ML/MIN/1.73M2 — SIGNIFICANT CHANGE UP
EGFR: 85 ML/MIN/1.73M2 — SIGNIFICANT CHANGE UP
EOSINOPHIL # BLD AUTO: 0.04 K/UL — SIGNIFICANT CHANGE UP (ref 0–0.5)
EOSINOPHIL NFR BLD AUTO: 0.6 % — SIGNIFICANT CHANGE UP (ref 0–6)
ETHANOL SERPL-MCNC: 179 MG/DL — HIGH (ref 0–10)
FLUAV AG NPH QL: SIGNIFICANT CHANGE UP
FLUBV AG NPH QL: SIGNIFICANT CHANGE UP
GLUCOSE SERPL-MCNC: 82 MG/DL — SIGNIFICANT CHANGE UP (ref 70–99)
GLUCOSE SERPL-MCNC: 87 MG/DL — SIGNIFICANT CHANGE UP (ref 70–99)
HCT VFR BLD CALC: 41.4 % — SIGNIFICANT CHANGE UP (ref 39–50)
HGB BLD-MCNC: 12.9 G/DL — LOW (ref 13–17)
IMM GRANULOCYTES NFR BLD AUTO: 0.2 % — SIGNIFICANT CHANGE UP (ref 0–0.9)
INR BLD: 1.05 RATIO — SIGNIFICANT CHANGE UP (ref 0.88–1.16)
LIDOCAIN IGE QN: 126 U/L — SIGNIFICANT CHANGE UP (ref 73–393)
LYMPHOCYTES # BLD AUTO: 1.69 K/UL — SIGNIFICANT CHANGE UP (ref 1–3.3)
LYMPHOCYTES # BLD AUTO: 27.4 % — SIGNIFICANT CHANGE UP (ref 13–44)
MAGNESIUM SERPL-MCNC: 1.7 MG/DL — SIGNIFICANT CHANGE UP (ref 1.6–2.6)
MAGNESIUM SERPL-MCNC: 2.1 MG/DL — SIGNIFICANT CHANGE UP (ref 1.6–2.6)
MANUAL SMEAR VERIFICATION: SIGNIFICANT CHANGE UP
MCHC RBC-ENTMCNC: 22.8 PG — LOW (ref 27–34)
MCHC RBC-ENTMCNC: 31.2 G/DL — LOW (ref 32–36)
MCV RBC AUTO: 73.3 FL — LOW (ref 80–100)
MONOCYTES # BLD AUTO: 0.33 K/UL — SIGNIFICANT CHANGE UP (ref 0–0.9)
MONOCYTES NFR BLD AUTO: 5.3 % — SIGNIFICANT CHANGE UP (ref 2–14)
NEUTROPHILS # BLD AUTO: 4.05 K/UL — SIGNIFICANT CHANGE UP (ref 1.8–7.4)
NEUTROPHILS NFR BLD AUTO: 65.7 % — SIGNIFICANT CHANGE UP (ref 43–77)
NRBC # BLD: 0 /100 WBCS — SIGNIFICANT CHANGE UP (ref 0–0)
PHOSPHATE SERPL-MCNC: 2.1 MG/DL — LOW (ref 2.5–4.5)
PLAT MORPH BLD: NORMAL — SIGNIFICANT CHANGE UP
PLATELET # BLD AUTO: 306 K/UL — SIGNIFICANT CHANGE UP (ref 150–400)
POTASSIUM SERPL-MCNC: 3.4 MMOL/L — LOW (ref 3.5–5.3)
POTASSIUM SERPL-MCNC: 3.5 MMOL/L — SIGNIFICANT CHANGE UP (ref 3.5–5.3)
POTASSIUM SERPL-SCNC: 3.4 MMOL/L — LOW (ref 3.5–5.3)
POTASSIUM SERPL-SCNC: 3.5 MMOL/L — SIGNIFICANT CHANGE UP (ref 3.5–5.3)
PROT SERPL-MCNC: 6.8 GM/DL — SIGNIFICANT CHANGE UP (ref 6–8.3)
PROT SERPL-MCNC: 8.3 GM/DL — SIGNIFICANT CHANGE UP (ref 6–8.3)
PROTHROM AB SERPL-ACNC: 12.5 SEC — SIGNIFICANT CHANGE UP (ref 10.5–13.4)
RBC # BLD: 5.65 M/UL — SIGNIFICANT CHANGE UP (ref 4.2–5.8)
RBC # FLD: 19.2 % — HIGH (ref 10.3–14.5)
RBC BLD AUTO: NORMAL — SIGNIFICANT CHANGE UP
SARS-COV-2 RNA SPEC QL NAA+PROBE: SIGNIFICANT CHANGE UP
SODIUM SERPL-SCNC: 142 MMOL/L — SIGNIFICANT CHANGE UP (ref 135–145)
SODIUM SERPL-SCNC: 142 MMOL/L — SIGNIFICANT CHANGE UP (ref 135–145)
WBC # BLD: 6.17 K/UL — SIGNIFICANT CHANGE UP (ref 3.8–10.5)
WBC # FLD AUTO: 6.17 K/UL — SIGNIFICANT CHANGE UP (ref 3.8–10.5)

## 2022-09-22 PROCEDURE — 93010 ELECTROCARDIOGRAM REPORT: CPT

## 2022-09-22 PROCEDURE — 74176 CT ABD & PELVIS W/O CONTRAST: CPT | Mod: 26,MA

## 2022-09-22 PROCEDURE — 71045 X-RAY EXAM CHEST 1 VIEW: CPT | Mod: 26

## 2022-09-22 PROCEDURE — 99285 EMERGENCY DEPT VISIT HI MDM: CPT

## 2022-09-22 PROCEDURE — 99222 1ST HOSP IP/OBS MODERATE 55: CPT

## 2022-09-22 RX ORDER — OXYCODONE HYDROCHLORIDE 5 MG/1
5 TABLET ORAL EVERY 6 HOURS
Refills: 0 | Status: DISCONTINUED | OUTPATIENT
Start: 2022-09-22 | End: 2022-09-23

## 2022-09-22 RX ORDER — SODIUM CHLORIDE 9 MG/ML
1000 INJECTION INTRAMUSCULAR; INTRAVENOUS; SUBCUTANEOUS ONCE
Refills: 0 | Status: COMPLETED | OUTPATIENT
Start: 2022-09-22 | End: 2022-09-22

## 2022-09-22 RX ORDER — THIAMINE MONONITRATE (VIT B1) 100 MG
100 TABLET ORAL DAILY
Refills: 0 | Status: DISCONTINUED | OUTPATIENT
Start: 2022-09-23 | End: 2022-09-23

## 2022-09-22 RX ORDER — POTASSIUM CHLORIDE 20 MEQ
40 PACKET (EA) ORAL ONCE
Refills: 0 | Status: COMPLETED | OUTPATIENT
Start: 2022-09-22 | End: 2022-09-22

## 2022-09-22 RX ORDER — ONDANSETRON 8 MG/1
4 TABLET, FILM COATED ORAL ONCE
Refills: 0 | Status: COMPLETED | OUTPATIENT
Start: 2022-09-22 | End: 2022-09-22

## 2022-09-22 RX ORDER — THIAMINE MONONITRATE (VIT B1) 100 MG
100 TABLET ORAL ONCE
Refills: 0 | Status: COMPLETED | OUTPATIENT
Start: 2022-09-22 | End: 2022-09-22

## 2022-09-22 RX ORDER — PANTOPRAZOLE SODIUM 20 MG/1
40 TABLET, DELAYED RELEASE ORAL ONCE
Refills: 0 | Status: COMPLETED | OUTPATIENT
Start: 2022-09-22 | End: 2022-09-22

## 2022-09-22 RX ORDER — INFLUENZA VIRUS VACCINE 15; 15; 15; 15 UG/.5ML; UG/.5ML; UG/.5ML; UG/.5ML
0.5 SUSPENSION INTRAMUSCULAR ONCE
Refills: 0 | Status: DISCONTINUED | OUTPATIENT
Start: 2022-09-22 | End: 2022-09-23

## 2022-09-22 RX ORDER — PANTOPRAZOLE SODIUM 20 MG/1
40 TABLET, DELAYED RELEASE ORAL
Refills: 0 | Status: DISCONTINUED | OUTPATIENT
Start: 2022-09-22 | End: 2022-09-23

## 2022-09-22 RX ORDER — ONDANSETRON 8 MG/1
4 TABLET, FILM COATED ORAL EVERY 6 HOURS
Refills: 0 | Status: DISCONTINUED | OUTPATIENT
Start: 2022-09-22 | End: 2022-09-23

## 2022-09-22 RX ORDER — ACETAMINOPHEN 500 MG
650 TABLET ORAL EVERY 6 HOURS
Refills: 0 | Status: DISCONTINUED | OUTPATIENT
Start: 2022-09-22 | End: 2022-09-23

## 2022-09-22 RX ORDER — FOLIC ACID 0.8 MG
1 TABLET ORAL ONCE
Refills: 0 | Status: COMPLETED | OUTPATIENT
Start: 2022-09-22 | End: 2022-09-22

## 2022-09-22 RX ORDER — SUCRALFATE 1 G
1 TABLET ORAL
Refills: 0 | Status: DISCONTINUED | OUTPATIENT
Start: 2022-09-22 | End: 2022-09-23

## 2022-09-22 RX ORDER — SODIUM CHLORIDE 9 MG/ML
1000 INJECTION, SOLUTION INTRAVENOUS
Refills: 0 | Status: DISCONTINUED | OUTPATIENT
Start: 2022-09-22 | End: 2022-09-23

## 2022-09-22 RX ORDER — FOLIC ACID 0.8 MG
1 TABLET ORAL DAILY
Refills: 0 | Status: DISCONTINUED | OUTPATIENT
Start: 2022-09-23 | End: 2022-09-23

## 2022-09-22 RX ORDER — HYDROMORPHONE HYDROCHLORIDE 2 MG/ML
1 INJECTION INTRAMUSCULAR; INTRAVENOUS; SUBCUTANEOUS ONCE
Refills: 0 | Status: DISCONTINUED | OUTPATIENT
Start: 2022-09-22 | End: 2022-09-22

## 2022-09-22 RX ADMIN — PANTOPRAZOLE SODIUM 40 MILLIGRAM(S): 20 TABLET, DELAYED RELEASE ORAL at 08:10

## 2022-09-22 RX ADMIN — OXYCODONE HYDROCHLORIDE 5 MILLIGRAM(S): 5 TABLET ORAL at 22:50

## 2022-09-22 RX ADMIN — SODIUM CHLORIDE 75 MILLILITER(S): 9 INJECTION, SOLUTION INTRAVENOUS at 14:45

## 2022-09-22 RX ADMIN — Medication 30 MILLILITER(S): at 14:45

## 2022-09-22 RX ADMIN — OXYCODONE HYDROCHLORIDE 5 MILLIGRAM(S): 5 TABLET ORAL at 21:50

## 2022-09-22 RX ADMIN — Medication 100 MILLIGRAM(S): at 08:26

## 2022-09-22 RX ADMIN — Medication 1 MILLIGRAM(S): at 08:26

## 2022-09-22 RX ADMIN — OXYCODONE HYDROCHLORIDE 5 MILLIGRAM(S): 5 TABLET ORAL at 15:11

## 2022-09-22 RX ADMIN — HYDROMORPHONE HYDROCHLORIDE 1 MILLIGRAM(S): 2 INJECTION INTRAMUSCULAR; INTRAVENOUS; SUBCUTANEOUS at 08:09

## 2022-09-22 RX ADMIN — Medication 2 MILLIGRAM(S): at 17:12

## 2022-09-22 RX ADMIN — SODIUM CHLORIDE 1000 MILLILITER(S): 9 INJECTION INTRAMUSCULAR; INTRAVENOUS; SUBCUTANEOUS at 12:26

## 2022-09-22 RX ADMIN — SODIUM CHLORIDE 1000 MILLILITER(S): 9 INJECTION INTRAMUSCULAR; INTRAVENOUS; SUBCUTANEOUS at 08:09

## 2022-09-22 RX ADMIN — Medication 2 MILLIGRAM(S): at 18:19

## 2022-09-22 RX ADMIN — Medication 2 MILLIGRAM(S): at 11:52

## 2022-09-22 RX ADMIN — Medication 40 MILLIEQUIVALENT(S): at 12:37

## 2022-09-22 RX ADMIN — ONDANSETRON 4 MILLIGRAM(S): 8 TABLET, FILM COATED ORAL at 08:11

## 2022-09-22 RX ADMIN — Medication 2 MILLIGRAM(S): at 21:45

## 2022-09-22 RX ADMIN — SODIUM CHLORIDE 75 MILLILITER(S): 9 INJECTION, SOLUTION INTRAVENOUS at 18:28

## 2022-09-22 RX ADMIN — HYDROMORPHONE HYDROCHLORIDE 1 MILLIGRAM(S): 2 INJECTION INTRAMUSCULAR; INTRAVENOUS; SUBCUTANEOUS at 12:26

## 2022-09-22 NOTE — ED ADULT NURSE NOTE - OBJECTIVE STATEMENT
Patient was BIB [facility] c/o abdominal pain, n/v x 2 days. Last drink 2 days ago and tremulous. pt with ciwa of 11 md notified

## 2022-09-22 NOTE — ED ADULT NURSE NOTE - CHIEF COMPLAINT QUOTE
Patient was BIB [facility] c/o abdominal pain, n/v x 2 days. Last drink 2 days ago and tremulous.

## 2022-09-22 NOTE — H&P ADULT - NSHPPHYSICALEXAM_GEN_ALL_CORE
CONSTITUTIONAL: Well developed, well nourished, alert and cooperative,   EYES: PERRL, EOMI, no scleral icterus  ENT: Mucosa moist, tongue normal.   NECK: Neck supple, trachea midline, non-tender.  CARDIAC: Normal S1 and S2. Regular rate and rhythms. No murmurs.  No Pedal edema. Peripheral pulses intact  LUNGS: Equal air entry both lungs. No rales, rhonchi, wheezing. Normal respiratory effort.   ABDOMEN: Epigastric tenderness +. No guarding or rebound tenderness. No hepatomegaly or splenomegaly. Bowel sound normal.  MUSCULOSKELETAL: Normocephalic, atraumatic.  No significant deformity or joint abnormality  NEUROLOGICAL: No gross motor or sensory deficits  SKIN: no lesions or eruptions. Normal turgor  PSYCHIATRIC: A&O x 3, appropriate mood and affect.

## 2022-09-22 NOTE — ED PROVIDER NOTE - OBJECTIVE STATEMENT
57 years old male by ems c/o severe diffused abdominal pain nausea vomiting and nonbloody diarrhea for a long time. Pt has a hx of alcohol abuse and last drink was two weeks ago. Pt denies recent hx of trauma, headache, dizziness, blurred visions, light sensitivities, focal/distal weakness or numbness, neck/back/hips/calfs pain, cough, sob, chest pain, fever, chills, dysuria, hematuria or abnormal stool color.

## 2022-09-22 NOTE — H&P ADULT - PROBLEM SELECTOR PLAN 4
Patient given inconsistent history of current alcohol use. Initially reported to me last use was more than 2 months ago, but later said 2 days ago  Serum alcohol level is elevated at 179  Clinically not in apparent withdrawal at this moment  Thiamine, folic acid, multivitamin  CIWA protocol  Librium prn for alcohol withdrawal symptoms

## 2022-09-22 NOTE — ED PROVIDER NOTE - CONSTITUTIONAL, MLM
Well appearing, awake, alert, oriented to person, place, time/situation and in no apparent distress. Speaking in clear full sentences no nasal flaring no shoulders retractions no diaphoresis no active vomiting, not holding his head/chest/abdmne normal...

## 2022-09-22 NOTE — H&P ADULT - PROBLEM SELECTOR PLAN 1
epigastric pain radiate to upper chest  CT abd/pelvis reported Suspect a combination of chronic and acute inflammatory process involving the ascending colon. Possible distal esophageal mural thickening  Lipase normal  Abd exam is more consistent with gastritis  Likely alcoholic gastritis  Oral PPI  Full liquid diet and advance as tolerate  Alcohol cessation education

## 2022-09-22 NOTE — H&P ADULT - HISTORY OF PRESENT ILLNESS
57 years old male with h/o alcohol abuse present to ED with complain of nausea, vomiting and epigastric pain for last few days. Patient reported buring severe epigastric pain, radiate to upper chest, associated with multiple episode of nonbloody vomiting.  Hemodynamically stable, afebrile, sat well at RA. No leukocytosis, Plt 306, K 3.4, Cr 1.04, lipase 126, serum alcohol 179. CT abd/pelvis reported Suspect a combination of chronic and acute inflammatory process involving the ascending colon. Possible distal esophageal mural thickening. CXR image reviewed, no focal consolidation.     SH: alcohol use  FH: mother- HTN

## 2022-09-22 NOTE — ED ADULT TRIAGE NOTE - CHIEF COMPLAINT QUOTE
Patient was BIB [facility] EMS c/o abdominal pain, n/v x 2 days. Last drink 2 days ago and tremulous.

## 2022-09-22 NOTE — PATIENT PROFILE ADULT - FALL HARM RISK - HARM RISK INTERVENTIONS

## 2022-09-22 NOTE — PATIENT PROFILE ADULT - NSPROPTRIGHTCAREGIVER_GEN_A_NUR
Pt p/w adequate oral & pharyngeal phases of swallow e/b good labial seal, functional bolus manipulation/propulsion, & timely pharyngeal swallow. Solid PO trials were not administered 2/2 to pt not having dentures. No overt s/s of laryngeal penetration or aspiration at this exam.
no

## 2022-09-22 NOTE — H&P ADULT - ASSESSMENT
57 years old male with h/o alcohol abuse present to ED with complain of nausea, vomiting and epigastric pain for last few days. Patient reported buring severe epigastric pain, radiate to upper chest, associated with multiple episode of nonbloody vomiting.  Hemodynamically stable, afebrile, sat well at RA. No leukocytosis, Plt 306, K 3.4, Cr 1.04, lipase 126, serum alcohol 179. CT abd/pelvis reported Suspect a combination of chronic and acute inflammatory process involving the ascending colon. Possible distal esophageal mural thickening. CXR image reviewed, no focal consolidation.     Admitted with acute gastritis, nausea and vomiting

## 2022-09-22 NOTE — ED PROVIDER NOTE - ABDOMINAL TENDER
No Gibson;s sign/left upper quadrant/right upper quadrant/left lower quadrant/right lower quadrant/epigastric

## 2022-09-22 NOTE — ED PROVIDER NOTE - MUSCULOSKELETAL, MLM
Spine appears normal, range of motion is not limited, no muscle or joint tenderness no edema non tender to palp bilateral legs

## 2022-09-23 ENCOUNTER — TRANSCRIPTION ENCOUNTER (OUTPATIENT)
Age: 57
End: 2022-09-23

## 2022-09-23 VITALS
OXYGEN SATURATION: 98 % | RESPIRATION RATE: 18 BRPM | TEMPERATURE: 97 F | DIASTOLIC BLOOD PRESSURE: 87 MMHG | HEART RATE: 68 BPM | SYSTOLIC BLOOD PRESSURE: 147 MMHG

## 2022-09-23 LAB
ALBUMIN SERPL ELPH-MCNC: 3.3 G/DL — SIGNIFICANT CHANGE UP (ref 3.3–5)
ALP SERPL-CCNC: 61 U/L — SIGNIFICANT CHANGE UP (ref 40–120)
ALT FLD-CCNC: 25 U/L — SIGNIFICANT CHANGE UP (ref 12–78)
ANION GAP SERPL CALC-SCNC: 6 MMOL/L — SIGNIFICANT CHANGE UP (ref 5–17)
ANION GAP SERPL CALC-SCNC: 8 MMOL/L — SIGNIFICANT CHANGE UP (ref 5–17)
AST SERPL-CCNC: 32 U/L — SIGNIFICANT CHANGE UP (ref 15–37)
BILIRUB SERPL-MCNC: 1 MG/DL — SIGNIFICANT CHANGE UP (ref 0.2–1.2)
BUN SERPL-MCNC: 14 MG/DL — SIGNIFICANT CHANGE UP (ref 7–23)
BUN SERPL-MCNC: 15 MG/DL — SIGNIFICANT CHANGE UP (ref 7–23)
CALCIUM SERPL-MCNC: 8.5 MG/DL — SIGNIFICANT CHANGE UP (ref 8.5–10.1)
CALCIUM SERPL-MCNC: 8.6 MG/DL — SIGNIFICANT CHANGE UP (ref 8.5–10.1)
CHLORIDE SERPL-SCNC: 106 MMOL/L — SIGNIFICANT CHANGE UP (ref 96–108)
CHLORIDE SERPL-SCNC: 109 MMOL/L — HIGH (ref 96–108)
CO2 SERPL-SCNC: 25 MMOL/L — SIGNIFICANT CHANGE UP (ref 22–31)
CO2 SERPL-SCNC: 25 MMOL/L — SIGNIFICANT CHANGE UP (ref 22–31)
CREAT SERPL-MCNC: 0.94 MG/DL — SIGNIFICANT CHANGE UP (ref 0.5–1.3)
CREAT SERPL-MCNC: 0.96 MG/DL — SIGNIFICANT CHANGE UP (ref 0.5–1.3)
EGFR: 93 ML/MIN/1.73M2 — SIGNIFICANT CHANGE UP
EGFR: 96 ML/MIN/1.73M2 — SIGNIFICANT CHANGE UP
GLUCOSE SERPL-MCNC: 91 MG/DL — SIGNIFICANT CHANGE UP (ref 70–99)
GLUCOSE SERPL-MCNC: 94 MG/DL — SIGNIFICANT CHANGE UP (ref 70–99)
HCT VFR BLD CALC: 37.8 % — LOW (ref 39–50)
HGB BLD-MCNC: 11.6 G/DL — LOW (ref 13–17)
MAGNESIUM SERPL-MCNC: 1.7 MG/DL — SIGNIFICANT CHANGE UP (ref 1.6–2.6)
MAGNESIUM SERPL-MCNC: 1.9 MG/DL — SIGNIFICANT CHANGE UP (ref 1.6–2.6)
MCHC RBC-ENTMCNC: 22.7 PG — LOW (ref 27–34)
MCHC RBC-ENTMCNC: 30.7 G/DL — LOW (ref 32–36)
MCV RBC AUTO: 74.1 FL — LOW (ref 80–100)
NRBC # BLD: 0 /100 WBCS — SIGNIFICANT CHANGE UP (ref 0–0)
PHOSPHATE SERPL-MCNC: 2 MG/DL — LOW (ref 2.5–4.5)
PHOSPHATE SERPL-MCNC: 2 MG/DL — LOW (ref 2.5–4.5)
PLATELET # BLD AUTO: 221 K/UL — SIGNIFICANT CHANGE UP (ref 150–400)
POTASSIUM SERPL-MCNC: 3.6 MMOL/L — SIGNIFICANT CHANGE UP (ref 3.5–5.3)
POTASSIUM SERPL-MCNC: 3.7 MMOL/L — SIGNIFICANT CHANGE UP (ref 3.5–5.3)
POTASSIUM SERPL-SCNC: 3.6 MMOL/L — SIGNIFICANT CHANGE UP (ref 3.5–5.3)
POTASSIUM SERPL-SCNC: 3.7 MMOL/L — SIGNIFICANT CHANGE UP (ref 3.5–5.3)
PROT SERPL-MCNC: 6.9 GM/DL — SIGNIFICANT CHANGE UP (ref 6–8.3)
RBC # BLD: 5.1 M/UL — SIGNIFICANT CHANGE UP (ref 4.2–5.8)
RBC # FLD: 17.8 % — HIGH (ref 10.3–14.5)
SODIUM SERPL-SCNC: 139 MMOL/L — SIGNIFICANT CHANGE UP (ref 135–145)
SODIUM SERPL-SCNC: 140 MMOL/L — SIGNIFICANT CHANGE UP (ref 135–145)
WBC # BLD: 3.56 K/UL — LOW (ref 3.8–10.5)
WBC # FLD AUTO: 3.56 K/UL — LOW (ref 3.8–10.5)

## 2022-09-23 PROCEDURE — 99239 HOSP IP/OBS DSCHRG MGMT >30: CPT

## 2022-09-23 RX ORDER — SUCRALFATE 1 G
1 TABLET ORAL
Qty: 0 | Refills: 0 | DISCHARGE
Start: 2022-09-23

## 2022-09-23 RX ORDER — THIAMINE MONONITRATE (VIT B1) 100 MG
1 TABLET ORAL
Qty: 0 | Refills: 0 | DISCHARGE

## 2022-09-23 RX ORDER — FOLIC ACID 0.8 MG
1 TABLET ORAL
Qty: 30 | Refills: 0
Start: 2022-09-23 | End: 2022-10-22

## 2022-09-23 RX ORDER — PANTOPRAZOLE SODIUM 20 MG/1
1 TABLET, DELAYED RELEASE ORAL
Qty: 0 | Refills: 0 | DISCHARGE
Start: 2022-09-23

## 2022-09-23 RX ADMIN — Medication 1 GRAM(S): at 06:25

## 2022-09-23 RX ADMIN — Medication 2 MILLIGRAM(S): at 02:03

## 2022-09-23 RX ADMIN — Medication 1 GRAM(S): at 00:20

## 2022-09-23 RX ADMIN — PANTOPRAZOLE SODIUM 40 MILLIGRAM(S): 20 TABLET, DELAYED RELEASE ORAL at 06:26

## 2022-09-23 RX ADMIN — Medication 2 MILLIGRAM(S): at 06:25

## 2022-09-23 RX ADMIN — Medication 1 MILLIGRAM(S): at 11:19

## 2022-09-23 RX ADMIN — Medication 1 GRAM(S): at 11:19

## 2022-09-23 RX ADMIN — Medication 100 MILLIGRAM(S): at 11:19

## 2022-09-23 RX ADMIN — Medication 2 MILLIGRAM(S): at 10:04

## 2022-09-23 RX ADMIN — SODIUM CHLORIDE 75 MILLILITER(S): 9 INJECTION, SOLUTION INTRAVENOUS at 06:28

## 2022-09-23 NOTE — DISCHARGE NOTE PROVIDER - ATTENDING DISCHARGE PHYSICAL EXAMINATION:
GENERAL: NAD, well-groomed, well-developed  HEAD:  Atraumatic, Normocephalic  EYES: EOMI, PERRLA, conjunctiva and sclera clear  ENMT: No tonsillar erythema, exudates, or enlargement; Moist mucous membranes, Good dentition, No lesions  NECK: Supple, No JVD, Normal thyroid  NERVOUS SYSTEM:  Alert & Oriented X3, Poor concentration; Motor Strength 5/5 B/L upper and lower extremities; DTRs 2+ intact and symmetric  CHEST/LUNG: Clear to percussion bilaterally; No rales, rhonchi, wheezing, or rubs  HEART: Regular rate and rhythm; No murmurs, rubs, or gallops  ABDOMEN: Soft, Nontender, Nondistended; Bowel sounds present  EXTREMITIES:  2+ Peripheral Pulses, No clubbing, cyanosis, or edema  LYMPH: No lymphadenopathy noted  SKIN: No rashes or lesions

## 2022-09-23 NOTE — DISCHARGE NOTE PROVIDER - NSDCCPCAREPLAN_GEN_ALL_CORE_FT
PRINCIPAL DISCHARGE DIAGNOSIS  Diagnosis: Alcohol dependence with withdrawal  Assessment and Plan of Treatment:       SECONDARY DISCHARGE DIAGNOSES  Diagnosis: Gastritis  Assessment and Plan of Treatment:

## 2022-09-23 NOTE — DISCHARGE NOTE NURSING/CASE MANAGEMENT/SOCIAL WORK - PATIENT PORTAL LINK FT
You can access the FollowMyHealth Patient Portal offered by [facility] by registering at the following website: [email or web address]. By joining [employer]’s FollowMyHealth portal, you will also be able to view your health information using other applications (apps) compatible with our system.

## 2022-09-23 NOTE — DISCHARGE NOTE PROVIDER - HOSPITAL COURSE
57 years old male with h/o alcohol abuse present to ED with complain of nausea, vomiting and epigastric pain for last few days. Patient reported buring severe epigastric pain, radiate to upper chest, associated with multiple episode of nonbloody vomiting.  Hemodynamically stable, afebrile, sat well at RA. No leukocytosis, Plt 306, K 3.4, Cr 1.04, lipase 126, serum alcohol 179. CT abd/pelvis reported Suspect a combination of chronic and acute inflammatory process involving the ascending colon. Possible distal esophageal mural thickening. CXR image reviewed, no focal consolidation.     Admitted with acute gastritis, nausea and vomiting now medically stable for discharge home with outpatient follow up with PCP.     Problem/Plan - 1:  ·  Problem: Acute gastritis.   ·  Plan: epigastric pain radiate to upper chest  CT abd/pelvis reported Suspect a combination of chronic and acute inflammatory process involving the ascending colon. Possible distal esophageal mural thickening  Lipase normal  Abd exam is more consistent with gastritis  Alcoholic gastritis  Oral PPI  Full liquid diet and advance as tolerated  Alcohol cessation education.    Problem/Plan - 2:  ·  Problem: Nausea and vomiting.   ·  Plan: due to alcoholic gastritis  Oral PPI  Zofran prn  liquid diet and advance as tolerate.    Problem/Plan - 3:  ·  Problem: Hypokalemia.   ·  Plan: K 3.4  replace with oral KCL  monitor and replace serum electrolytes.    Problem/Plan - 4:  ·  Problem: Severe alcohol use disorder.   ·  Plan: Patient given inconsistent history of current alcohol use. Initially reported to me last use was more than 2 months ago, but later said 2 days ago  Serum alcohol level is elevated at 179  Clinically not in apparent withdrawal at this moment  Thiamine, folic acid, multivitamin  CIWA protocol  Librium prn for alcohol withdrawal symptoms.

## 2022-09-23 NOTE — PROGRESS NOTE ADULT - ASSESSMENT
57 years old male with h/o alcohol abuse present to ED with complain of nausea, vomiting and epigastric pain for last few days. Patient reported buring severe epigastric pain, radiate to upper chest, associated with multiple episode of nonbloody vomiting.  Hemodynamically stable, afebrile, sat well at RA. No leukocytosis, Plt 306, K 3.4, Cr 1.04, lipase 126, serum alcohol 179. CT abd/pelvis reported Suspect a combination of chronic and acute inflammatory process involving the ascending colon. Possible distal esophageal mural thickening. CXR image reviewed, no focal consolidation.     Admitted with acute gastritis, nausea and vomiting    Problem/Plan - 1:  ·  Problem: Acute gastritis.   ·  Plan: epigastric pain radiate to upper chest  CT abd/pelvis reported Suspect a combination of chronic and acute inflammatory process involving the ascending colon. Possible distal esophageal mural thickening  Lipase normal  Abd exam is more consistent with gastritis  Likely alcoholic gastritis  Oral PPI  Full liquid diet and advance as tolerate  Alcohol cessation education.    Problem/Plan - 2:  ·  Problem: Nausea and vomiting.   ·  Plan: due to alcoholic gastritis  Oral PPI  Zofran prn  liquid diet and advance as tolerate.    Problem/Plan - 3:  ·  Problem: Hypokalemia.   ·  Plan: K 3.4  replace with oral KCL  monitor and replace serum electrolytes.    Problem/Plan - 4:  ·  Problem: Severe alcohol use disorder.   ·  Plan: Patient given inconsistent history of current alcohol use. Initially reported to me last use was more than 2 months ago, but later said 2 days ago  Serum alcohol level is elevated at 179  Clinically not in apparent withdrawal at this moment  Thiamine, folic acid, multivitamin  CIWA protocol  Librium prn for alcohol withdrawal symptoms.

## 2022-09-23 NOTE — DISCHARGE NOTE NURSING/CASE MANAGEMENT/SOCIAL WORK - NSDCVIVACCINE_GEN_ALL_CORE_FT
COVID-19, mRNA, LNP-S, PF, 100 mcg/ 0.5 mL dose (Moderna); 10-Jovan-2021 15:38; Reji Hernandez (RN); [employer] [id number] (Exp. Date: 13-Jul-2021); IntraMuscular; Deltoid Right.; 0.5 milliLiter(s);   influenza, injectable, quadrivalent, preservative free; 31-Jan-2019 15:53; Ayaka Bynum (RN); [employer]; [id number] (Exp. Date: 30-Jun-2019); IntraMuscular; Deltoid Right.; 0.5 milliLiter(s); VIS (VIS Published: 07-Aug-2015, VIS Presented: 31-Jan-2019);   influenza, injectable, quadrivalent, preservative free; 10-Nov-2019 14:13; Laverne Lane (RN); [employer]; [id number] (Exp. Date: 30-Jun-2020); IntraMuscular; Deltoid Left.; 0.5 milliLiter(s); VIS (VIS Published: 15-Aug-2019, VIS Presented: 10-Nov-2019);   influenza, injectable, quadrivalent, preservative free; 13-Oct-2020 11:56; Kimberli Cronin (RN); [employer]; [id number] (Exp. Date: 30-Jun-2021); IntraMuscular; Deltoid Right.; 0.5 milliLiter(s); VIS (VIS Published: 15-Aug-2019, VIS Presented: 13-Oct-2020);   Td (adult) preservative free; 18-Jan-2020 20:04; Yulia Vance (RN); [employer]; [id number] (Exp. Date: 19-Jan-2021); IntraMuscular; Deltoid Right.; 0.5 milliLiter(s); VIS (VIS Published: 18-Jan-2020, VIS Presented: 18-Jan-2020);

## 2022-09-23 NOTE — PROGRESS NOTE ADULT - SUBJECTIVE AND OBJECTIVE BOX
Patient is a 57y old  Male who presents with a chief complaint of acute gastritis (22 Sep 2022 15:26)    INTERVAL HPI/OVERNIGHT EVENTS: Patients seen and examined at bedside this morning. No acute events overnight. Pt reports    MEDICATIONS  (STANDING):  folic acid 1 milliGRAM(s) Oral daily  influenza   Vaccine 0.5 milliLiter(s) IntraMuscular once  lactated ringers. 1000 milliLiter(s) (75 mL/Hr) IV Continuous <Continuous>  LORazepam   Injectable   IV Push   LORazepam   Injectable 2 milliGRAM(s) IV Push every 4 hours  LORazepam   Injectable 1.5 milliGRAM(s) IV Push every 4 hours  pantoprazole    Tablet 40 milliGRAM(s) Oral before breakfast  sucralfate 1 Gram(s) Oral four times a day  thiamine 100 milliGRAM(s) Oral daily    MEDICATIONS  (PRN):  acetaminophen     Tablet .. 650 milliGRAM(s) Oral every 6 hours PRN Mild Pain (1 - 3), Moderate Pain (4 - 6)  aluminum hydroxide/magnesium hydroxide/simethicone Suspension 30 milliLiter(s) Oral every 4 hours PRN Dyspepsia  ondansetron Injectable 4 milliGRAM(s) IV Push every 6 hours PRN Nausea and/or Vomiting  oxyCODONE    IR 5 milliGRAM(s) Oral every 6 hours PRN Severe Pain (7 - 10)    Allergies    No Known Allergies    Intolerances      REVIEW OF SYSTEMS:  All other systems reviewed and are negative    Vital Signs Last 24 Hrs  T(C): 36.4 (23 Sep 2022 05:31), Max: 36.8 (22 Sep 2022 13:30)  T(F): 97.5 (23 Sep 2022 05:31), Max: 98.3 (22 Sep 2022 14:35)  HR: 61 (23 Sep 2022 05:31) (61 - 90)  BP: 132/66 (23 Sep 2022 05:31) (115/66 - 150/79)  BP(mean): --  RR: 18 (23 Sep 2022 05:31) (16 - 19)  SpO2: 97% (23 Sep 2022 05:31) (95% - 99%)    Parameters below as of 23 Sep 2022 05:31  Patient On (Oxygen Delivery Method): room air      Daily     Daily   I&O's Summary    22 Sep 2022 07:01  -  23 Sep 2022 07:00  --------------------------------------------------------  IN: 1020 mL / OUT: 0 mL / NET: 1020 mL      CAPILLARY BLOOD GLUCOSE        PHYSICAL EXAM:  GENERAL: NAD, well-groomed, well-developed  HEAD:  Atraumatic, Normocephalic  EYES: EOMI, PERRLA, conjunctiva and sclera clear  ENMT: No tonsillar erythema, exudates, or enlargement; Moist mucous membranes, Good dentition, No lesions  NECK: Supple, No JVD, Normal thyroid  NERVOUS SYSTEM:  Alert & Oriented X3, Poor concentration; Motor Strength 5/5 B/L upper and lower extremities; DTRs 2+ intact and symmetric  CHEST/LUNG: Clear to percussion bilaterally; No rales, rhonchi, wheezing, or rubs  HEART: Regular rate and rhythm; No murmurs, rubs, or gallops  ABDOMEN: Soft, Nontender, Nondistended; Bowel sounds present  EXTREMITIES:  2+ Peripheral Pulses, No clubbing, cyanosis, or edema  LYMPH: No lymphadenopathy noted  SKIN: No rashes or lesions    Labs                          11.6   3.56  )-----------( 221      ( 23 Sep 2022 07:51 )             37.8     09-22    142  |  109<H>  |  14  ----------------------------<  82  3.5   |  24  |  0.91    Ca    8.2<L>      22 Sep 2022 17:58  Phos  2.1     09-22  Mg     1.7     09-22    TPro  6.8  /  Alb  3.1<L>  /  TBili  0.7  /  DBili  0.2  /  AST  26  /  ALT  26  /  AlkPhos  56  09-22    PT/INR - ( 22 Sep 2022 07:56 )   PT: 12.5 sec;   INR: 1.05 ratio         PTT - ( 22 Sep 2022 07:56 )  PTT:26.8 sec

## 2022-09-27 DIAGNOSIS — E87.6 HYPOKALEMIA: ICD-10-CM

## 2022-09-27 DIAGNOSIS — Z82.49 FAMILY HISTORY OF ISCHEMIC HEART DISEASE AND OTHER DISEASES OF THE CIRCULATORY SYSTEM: ICD-10-CM

## 2022-09-27 DIAGNOSIS — F10.20 ALCOHOL DEPENDENCE, UNCOMPLICATED: ICD-10-CM

## 2022-09-27 DIAGNOSIS — R10.9 UNSPECIFIED ABDOMINAL PAIN: ICD-10-CM

## 2022-09-27 DIAGNOSIS — K29.20 ALCOHOLIC GASTRITIS WITHOUT BLEEDING: ICD-10-CM

## 2022-09-29 ENCOUNTER — INPATIENT (INPATIENT)
Facility: HOSPITAL | Age: 57
LOS: 17 days | Discharge: HOME | End: 2022-10-17
Attending: FAMILY MEDICINE | Admitting: FAMILY MEDICINE

## 2022-09-29 VITALS
OXYGEN SATURATION: 98 % | TEMPERATURE: 98 F | HEART RATE: 93 BPM | SYSTOLIC BLOOD PRESSURE: 93 MMHG | DIASTOLIC BLOOD PRESSURE: 68 MMHG | HEIGHT: 67 IN | WEIGHT: 179.9 LBS | RESPIRATION RATE: 16 BRPM

## 2022-09-29 LAB
ALBUMIN SERPL ELPH-MCNC: 3.9 G/DL — SIGNIFICANT CHANGE UP (ref 3.3–5)
ALP SERPL-CCNC: 72 U/L — SIGNIFICANT CHANGE UP (ref 40–120)
ALT FLD-CCNC: 45 U/L — SIGNIFICANT CHANGE UP (ref 12–78)
ANION GAP SERPL CALC-SCNC: 15 MMOL/L — SIGNIFICANT CHANGE UP (ref 5–17)
AST SERPL-CCNC: 42 U/L — HIGH (ref 15–37)
BASOPHILS # BLD AUTO: 0.02 K/UL — SIGNIFICANT CHANGE UP (ref 0–0.2)
BASOPHILS NFR BLD AUTO: 0.6 % — SIGNIFICANT CHANGE UP (ref 0–2)
BILIRUB SERPL-MCNC: 0.4 MG/DL — SIGNIFICANT CHANGE UP (ref 0.2–1.2)
BUN SERPL-MCNC: 13 MG/DL — SIGNIFICANT CHANGE UP (ref 7–23)
CALCIUM SERPL-MCNC: 8.7 MG/DL — SIGNIFICANT CHANGE UP (ref 8.5–10.1)
CHLORIDE SERPL-SCNC: 106 MMOL/L — SIGNIFICANT CHANGE UP (ref 96–108)
CO2 SERPL-SCNC: 22 MMOL/L — SIGNIFICANT CHANGE UP (ref 22–31)
CREAT SERPL-MCNC: 1.07 MG/DL — SIGNIFICANT CHANGE UP (ref 0.5–1.3)
EGFR: 82 ML/MIN/1.73M2 — SIGNIFICANT CHANGE UP
EOSINOPHIL # BLD AUTO: 0.01 K/UL — SIGNIFICANT CHANGE UP (ref 0–0.5)
EOSINOPHIL NFR BLD AUTO: 0.3 % — SIGNIFICANT CHANGE UP (ref 0–6)
FLUAV AG NPH QL: SIGNIFICANT CHANGE UP
FLUBV AG NPH QL: SIGNIFICANT CHANGE UP
GLUCOSE SERPL-MCNC: 98 MG/DL — SIGNIFICANT CHANGE UP (ref 70–99)
HCT VFR BLD CALC: 48.9 % — SIGNIFICANT CHANGE UP (ref 39–50)
HGB BLD-MCNC: 14.8 G/DL — SIGNIFICANT CHANGE UP (ref 13–17)
IMM GRANULOCYTES NFR BLD AUTO: 0.3 % — SIGNIFICANT CHANGE UP (ref 0–0.9)
LACTATE SERPL-SCNC: 5.7 MMOL/L — CRITICAL HIGH (ref 0.7–2)
LIDOCAIN IGE QN: 254 U/L — SIGNIFICANT CHANGE UP (ref 73–393)
LYMPHOCYTES # BLD AUTO: 1.71 K/UL — SIGNIFICANT CHANGE UP (ref 1–3.3)
LYMPHOCYTES # BLD AUTO: 52.5 % — HIGH (ref 13–44)
MCHC RBC-ENTMCNC: 22.8 PG — LOW (ref 27–34)
MCHC RBC-ENTMCNC: 30.3 G/DL — LOW (ref 32–36)
MCV RBC AUTO: 75.3 FL — LOW (ref 80–100)
MONOCYTES # BLD AUTO: 0.18 K/UL — SIGNIFICANT CHANGE UP (ref 0–0.9)
MONOCYTES NFR BLD AUTO: 5.5 % — SIGNIFICANT CHANGE UP (ref 2–14)
NEUTROPHILS # BLD AUTO: 1.33 K/UL — LOW (ref 1.8–7.4)
NEUTROPHILS NFR BLD AUTO: 40.8 % — LOW (ref 43–77)
NRBC # BLD: 0 /100 WBCS — SIGNIFICANT CHANGE UP (ref 0–0)
PLATELET # BLD AUTO: 212 K/UL — SIGNIFICANT CHANGE UP (ref 150–400)
POTASSIUM SERPL-MCNC: 3.8 MMOL/L — SIGNIFICANT CHANGE UP (ref 3.5–5.3)
POTASSIUM SERPL-SCNC: 3.8 MMOL/L — SIGNIFICANT CHANGE UP (ref 3.5–5.3)
PROT SERPL-MCNC: 9 GM/DL — HIGH (ref 6–8.3)
RBC # BLD: 6.49 M/UL — HIGH (ref 4.2–5.8)
RBC # FLD: 20.8 % — HIGH (ref 10.3–14.5)
SARS-COV-2 RNA SPEC QL NAA+PROBE: SIGNIFICANT CHANGE UP
SODIUM SERPL-SCNC: 143 MMOL/L — SIGNIFICANT CHANGE UP (ref 135–145)
WBC # BLD: 3.26 K/UL — LOW (ref 3.8–10.5)
WBC # FLD AUTO: 3.26 K/UL — LOW (ref 3.8–10.5)

## 2022-09-29 PROCEDURE — 99285 EMERGENCY DEPT VISIT HI MDM: CPT

## 2022-09-29 RX ORDER — SODIUM CHLORIDE 9 MG/ML
2500 INJECTION INTRAMUSCULAR; INTRAVENOUS; SUBCUTANEOUS ONCE
Refills: 0 | Status: COMPLETED | OUTPATIENT
Start: 2022-09-29 | End: 2022-09-29

## 2022-09-29 RX ORDER — MORPHINE SULFATE 50 MG/1
2 CAPSULE, EXTENDED RELEASE ORAL ONCE
Refills: 0 | Status: DISCONTINUED | OUTPATIENT
Start: 2022-09-29 | End: 2022-09-29

## 2022-09-29 RX ORDER — ONDANSETRON 8 MG/1
4 TABLET, FILM COATED ORAL ONCE
Refills: 0 | Status: COMPLETED | OUTPATIENT
Start: 2022-09-29 | End: 2022-09-29

## 2022-09-29 RX ADMIN — SODIUM CHLORIDE 2500 MILLILITER(S): 9 INJECTION INTRAMUSCULAR; INTRAVENOUS; SUBCUTANEOUS at 21:21

## 2022-09-29 RX ADMIN — Medication 2 MILLIGRAM(S): at 21:22

## 2022-09-29 RX ADMIN — MORPHINE SULFATE 2 MILLIGRAM(S): 50 CAPSULE, EXTENDED RELEASE ORAL at 23:44

## 2022-09-29 RX ADMIN — ONDANSETRON 4 MILLIGRAM(S): 8 TABLET, FILM COATED ORAL at 22:12

## 2022-09-29 NOTE — ED PROVIDER NOTE - CARE PLAN
Principal Discharge DX:	EtOH dependence   1 Principal Discharge DX:	EtOH dependence  Secondary Diagnosis:	Alcohol dependence with withdrawal

## 2022-09-29 NOTE — ED ADULT NURSE NOTE - OBJECTIVE STATEMENT
Patient A&OX3, breathing even and unlabored on room air, no acute respiratory distress noted. pmhx ETOH presented to ED s/p alcohol intoxication. Patient stating he is shaking and have a headache

## 2022-09-29 NOTE — ED PROVIDER NOTE - OBJECTIVE STATEMENT
here with abd pain known to ED for ETOH intox    pt is requesting pain meds today all other complaints non-specific

## 2022-09-29 NOTE — ED PROVIDER NOTE - PHYSICAL EXAMINATION
[place]:  General: No distress.  Mentation at baseline.   HEENT: WNL  Chest/Lungs: CTAB, No wheeze, No retractions, No increased work of breathing, Normal rate  Heart: S1S2 RRR, No M/R/G, Pules equal Bilaterally in upper and lower extremities distally  Abd: soft, NT/ND, No guarding, No rebound.  No hernias, no palpable masses.  Extrem: FROM in all joints, no significant edema noted, No ulcers.  Cap refil < 2sec.  Skin: No rash noted, warm dry.  Neuro:  Grossly normal.  No difficulty ambulating. No focal deficits.  Psychiatric: No evidence of delusions. No SI/HI.

## 2022-09-29 NOTE — ED ADULT NURSE NOTE - ED STAT RN HANDOFF DETAILS 2
Assessment/Plan:    No problem-specific Assessment & Plan notes found for this encounter  Diagnoses and all orders for this visit:    Type 2 diabetes mellitus without complication, without long-term current use of insulin (HCC)  -     Lancets (ONETOUCH ULTRASOFT) lancets; by Other route daily    Pain of right upper extremity  -     XR shoulder 2+ vw right  -     Methylprednisolone 4 MG TBPK; Use as directed on package  -     metaxalone (SKELAXIN) 800 mg tablet; Take 1 tablet (800 mg total) by mouth 3 (three) times a day          Subjective:      Patient ID: Jourdan Dennis is a 74 y o  male  Arm Pain    The incident occurred more than 1 week ago  The incident occurred at the gym  The injury mechanism is unknown  The pain is present in the right shoulder  The quality of the pain is described as cramping  The pain does not radiate  The pain is at a severity of 7/10  The pain is moderate  The pain has been fluctuating since the incident  Pertinent negatives include no chest pain, muscle weakness or numbness  The symptoms are aggravated by movement  He has tried rest for the symptoms  The treatment provided mild relief  2  The following portions of the patient's history were reviewed and updated as appropriate: allergies, current medications, past family history, past medical history, past social history, past surgical history and problem list     Review of Systems   Constitutional: Negative for unexpected weight change  Respiratory: Negative for shortness of breath  Cardiovascular: Negative for chest pain  Musculoskeletal: Positive for arthralgias  Arm pain   Neurological: Negative for numbness  Hematological: Negative for adenopathy           Objective:      /84 (BP Location: Left arm, Patient Position: Sitting, Cuff Size: Adult)   Pulse 78   Temp 97 6 °F (36 4 °C) (Temporal)   Resp 16   Ht 5' 4 8" (1 646 m)   Wt 73 6 kg (162 lb 4 oz)   BMI 27 17 kg/m²          Physical Exam Constitutional: He appears well-developed and well-nourished  Cardiovascular: Normal rate and regular rhythm  Pulmonary/Chest: Breath sounds normal    Musculoskeletal: He exhibits tenderness  Right shoulder: He exhibits decreased range of motion and tenderness  Report endorsed to PRETTY Amado. Safety checks completed this shift. Safety rounds completed hourly.  IV sites checked Q2+remains WDL. Fall & skin precautions in place. Any issues endorsed to PRETTY Amado for follow up.

## 2022-09-29 NOTE — ED PROVIDER NOTE - PROGRESS NOTE DETAILS
Patient well known to ER for multiple presentations for abdominal pain and alcohol dependence. Lactate still elevated and patient uncomfortable.  CT negative for acute pathology, patient now shaking, CIWA 8, feels himself withdrawing and requesting admission.

## 2022-09-30 DIAGNOSIS — R22.0 LOCALIZED SWELLING, MASS AND LUMP, HEAD: ICD-10-CM

## 2022-09-30 DIAGNOSIS — F10.230 ALCOHOL DEPENDENCE WITH WITHDRAWAL, UNCOMPLICATED: ICD-10-CM

## 2022-09-30 DIAGNOSIS — K29.20 ALCOHOLIC GASTRITIS WITHOUT BLEEDING: ICD-10-CM

## 2022-09-30 DIAGNOSIS — E87.2 ACIDOSIS: ICD-10-CM

## 2022-09-30 DIAGNOSIS — Z29.9 ENCOUNTER FOR PROPHYLACTIC MEASURES, UNSPECIFIED: ICD-10-CM

## 2022-09-30 LAB
BASOPHILS # BLD AUTO: 0.03 K/UL — SIGNIFICANT CHANGE UP (ref 0–0.2)
BASOPHILS NFR BLD AUTO: 0.8 % — SIGNIFICANT CHANGE UP (ref 0–2)
EOSINOPHIL # BLD AUTO: 0.06 K/UL — SIGNIFICANT CHANGE UP (ref 0–0.5)
EOSINOPHIL NFR BLD AUTO: 1.7 % — SIGNIFICANT CHANGE UP (ref 0–6)
HCT VFR BLD CALC: 35.5 % — LOW (ref 39–50)
HGB BLD-MCNC: 11 G/DL — LOW (ref 13–17)
IMM GRANULOCYTES NFR BLD AUTO: 0.3 % — SIGNIFICANT CHANGE UP (ref 0–0.9)
LACTATE SERPL-SCNC: 1.1 MMOL/L — SIGNIFICANT CHANGE UP (ref 0.7–2)
LACTATE SERPL-SCNC: 4.5 MMOL/L — CRITICAL HIGH (ref 0.7–2)
LACTATE SERPL-SCNC: 4.8 MMOL/L — CRITICAL HIGH (ref 0.7–2)
LACTATE SERPL-SCNC: 5.1 MMOL/L — CRITICAL HIGH (ref 0.7–2)
LYMPHOCYTES # BLD AUTO: 1.08 K/UL — SIGNIFICANT CHANGE UP (ref 1–3.3)
LYMPHOCYTES # BLD AUTO: 29.8 % — SIGNIFICANT CHANGE UP (ref 13–44)
MCHC RBC-ENTMCNC: 23.4 PG — LOW (ref 27–34)
MCHC RBC-ENTMCNC: 31 G/DL — LOW (ref 32–36)
MCV RBC AUTO: 75.4 FL — LOW (ref 80–100)
MONOCYTES # BLD AUTO: 0.32 K/UL — SIGNIFICANT CHANGE UP (ref 0–0.9)
MONOCYTES NFR BLD AUTO: 8.8 % — SIGNIFICANT CHANGE UP (ref 2–14)
NEUTROPHILS # BLD AUTO: 2.13 K/UL — SIGNIFICANT CHANGE UP (ref 1.8–7.4)
NEUTROPHILS NFR BLD AUTO: 58.6 % — SIGNIFICANT CHANGE UP (ref 43–77)
NRBC # BLD: 0 /100 WBCS — SIGNIFICANT CHANGE UP (ref 0–0)
PHOSPHATE SERPL-MCNC: 1.8 MG/DL — LOW (ref 2.5–4.5)
PLATELET # BLD AUTO: 170 K/UL — SIGNIFICANT CHANGE UP (ref 150–400)
RBC # BLD: 4.71 M/UL — SIGNIFICANT CHANGE UP (ref 4.2–5.8)
RBC # FLD: 19.6 % — HIGH (ref 10.3–14.5)
WBC # BLD: 3.63 K/UL — LOW (ref 3.8–10.5)
WBC # FLD AUTO: 3.63 K/UL — LOW (ref 3.8–10.5)

## 2022-09-30 PROCEDURE — 99222 1ST HOSP IP/OBS MODERATE 55: CPT

## 2022-09-30 PROCEDURE — 74176 CT ABD & PELVIS W/O CONTRAST: CPT | Mod: 26,MA

## 2022-09-30 PROCEDURE — 93010 ELECTROCARDIOGRAM REPORT: CPT

## 2022-09-30 PROCEDURE — 12345: CPT | Mod: NC

## 2022-09-30 PROCEDURE — 70487 CT MAXILLOFACIAL W/DYE: CPT | Mod: 26

## 2022-09-30 RX ORDER — FOLIC ACID 0.8 MG
1 TABLET ORAL ONCE
Refills: 0 | Status: COMPLETED | OUTPATIENT
Start: 2022-09-30 | End: 2022-09-30

## 2022-09-30 RX ORDER — MORPHINE SULFATE 50 MG/1
2 CAPSULE, EXTENDED RELEASE ORAL EVERY 4 HOURS
Refills: 0 | Status: DISCONTINUED | OUTPATIENT
Start: 2022-09-30 | End: 2022-10-01

## 2022-09-30 RX ORDER — SODIUM,POTASSIUM PHOSPHATES 278-250MG
2 POWDER IN PACKET (EA) ORAL
Refills: 0 | Status: DISCONTINUED | OUTPATIENT
Start: 2022-09-30 | End: 2022-10-01

## 2022-09-30 RX ORDER — PANTOPRAZOLE SODIUM 20 MG/1
40 TABLET, DELAYED RELEASE ORAL DAILY
Refills: 0 | Status: DISCONTINUED | OUTPATIENT
Start: 2022-09-30 | End: 2022-10-14

## 2022-09-30 RX ORDER — MORPHINE SULFATE 50 MG/1
4 CAPSULE, EXTENDED RELEASE ORAL ONCE
Refills: 0 | Status: DISCONTINUED | OUTPATIENT
Start: 2022-09-30 | End: 2022-09-30

## 2022-09-30 RX ORDER — SODIUM CHLORIDE 9 MG/ML
1000 INJECTION INTRAMUSCULAR; INTRAVENOUS; SUBCUTANEOUS ONCE
Refills: 0 | Status: COMPLETED | OUTPATIENT
Start: 2022-09-30 | End: 2022-09-30

## 2022-09-30 RX ORDER — THIAMINE MONONITRATE (VIT B1) 100 MG
100 TABLET ORAL ONCE
Refills: 0 | Status: COMPLETED | OUTPATIENT
Start: 2022-09-30 | End: 2022-09-30

## 2022-09-30 RX ORDER — THIAMINE MONONITRATE (VIT B1) 100 MG
100 TABLET ORAL DAILY
Refills: 0 | Status: DISCONTINUED | OUTPATIENT
Start: 2022-09-30 | End: 2022-10-01

## 2022-09-30 RX ORDER — ONDANSETRON 8 MG/1
4 TABLET, FILM COATED ORAL EVERY 6 HOURS
Refills: 0 | Status: DISCONTINUED | OUTPATIENT
Start: 2022-09-30 | End: 2022-10-14

## 2022-09-30 RX ORDER — FOLIC ACID 0.8 MG
1 TABLET ORAL DAILY
Refills: 0 | Status: DISCONTINUED | OUTPATIENT
Start: 2022-09-30 | End: 2022-10-01

## 2022-09-30 RX ORDER — VANCOMYCIN HCL 1 G
1000 VIAL (EA) INTRAVENOUS ONCE
Refills: 0 | Status: COMPLETED | OUTPATIENT
Start: 2022-09-30 | End: 2022-09-30

## 2022-09-30 RX ORDER — AMPICILLIN SODIUM AND SULBACTAM SODIUM 250; 125 MG/ML; MG/ML
3 INJECTION, POWDER, FOR SUSPENSION INTRAMUSCULAR; INTRAVENOUS ONCE
Refills: 0 | Status: COMPLETED | OUTPATIENT
Start: 2022-09-30 | End: 2022-09-30

## 2022-09-30 RX ORDER — AMPICILLIN SODIUM AND SULBACTAM SODIUM 250; 125 MG/ML; MG/ML
3 INJECTION, POWDER, FOR SUSPENSION INTRAMUSCULAR; INTRAVENOUS EVERY 6 HOURS
Refills: 0 | Status: DISCONTINUED | OUTPATIENT
Start: 2022-09-30 | End: 2022-10-01

## 2022-09-30 RX ORDER — INFLUENZA VIRUS VACCINE 15; 15; 15; 15 UG/.5ML; UG/.5ML; UG/.5ML; UG/.5ML
0.5 SUSPENSION INTRAMUSCULAR ONCE
Refills: 0 | Status: DISCONTINUED | OUTPATIENT
Start: 2022-09-30 | End: 2022-10-17

## 2022-09-30 RX ORDER — ONDANSETRON 8 MG/1
4 TABLET, FILM COATED ORAL ONCE
Refills: 0 | Status: COMPLETED | OUTPATIENT
Start: 2022-09-30 | End: 2022-09-30

## 2022-09-30 RX ORDER — SODIUM CHLORIDE 9 MG/ML
2000 INJECTION, SOLUTION INTRAVENOUS ONCE
Refills: 0 | Status: COMPLETED | OUTPATIENT
Start: 2022-09-30 | End: 2022-09-30

## 2022-09-30 RX ADMIN — Medication 62.5 MILLIMOLE(S): at 20:16

## 2022-09-30 RX ADMIN — MORPHINE SULFATE 4 MILLIGRAM(S): 50 CAPSULE, EXTENDED RELEASE ORAL at 01:07

## 2022-09-30 RX ADMIN — Medication 1 TABLET(S): at 12:35

## 2022-09-30 RX ADMIN — PANTOPRAZOLE SODIUM 40 MILLIGRAM(S): 20 TABLET, DELAYED RELEASE ORAL at 12:34

## 2022-09-30 RX ADMIN — Medication 4 MILLIGRAM(S): at 23:53

## 2022-09-30 RX ADMIN — MORPHINE SULFATE 4 MILLIGRAM(S): 50 CAPSULE, EXTENDED RELEASE ORAL at 01:20

## 2022-09-30 RX ADMIN — SODIUM CHLORIDE 1000 MILLILITER(S): 9 INJECTION INTRAMUSCULAR; INTRAVENOUS; SUBCUTANEOUS at 01:08

## 2022-09-30 RX ADMIN — Medication 100 MILLIGRAM(S): at 12:35

## 2022-09-30 RX ADMIN — Medication 2 MILLIGRAM(S): at 04:57

## 2022-09-30 RX ADMIN — Medication 4 MILLIGRAM(S): at 12:35

## 2022-09-30 RX ADMIN — SODIUM CHLORIDE 2000 MILLILITER(S): 9 INJECTION, SOLUTION INTRAVENOUS at 05:48

## 2022-09-30 RX ADMIN — MORPHINE SULFATE 4 MILLIGRAM(S): 50 CAPSULE, EXTENDED RELEASE ORAL at 12:40

## 2022-09-30 RX ADMIN — Medication 4 MILLIGRAM(S): at 18:28

## 2022-09-30 RX ADMIN — Medication 4 MILLIGRAM(S): at 05:52

## 2022-09-30 RX ADMIN — Medication 1 TABLET(S): at 04:56

## 2022-09-30 RX ADMIN — MORPHINE SULFATE 2 MILLIGRAM(S): 50 CAPSULE, EXTENDED RELEASE ORAL at 00:44

## 2022-09-30 RX ADMIN — Medication 1 MILLIGRAM(S): at 04:56

## 2022-09-30 RX ADMIN — ONDANSETRON 4 MILLIGRAM(S): 8 TABLET, FILM COATED ORAL at 05:52

## 2022-09-30 RX ADMIN — MORPHINE SULFATE 4 MILLIGRAM(S): 50 CAPSULE, EXTENDED RELEASE ORAL at 03:29

## 2022-09-30 RX ADMIN — Medication 100 MILLIGRAM(S): at 04:56

## 2022-09-30 RX ADMIN — Medication 1 MILLIGRAM(S): at 12:35

## 2022-09-30 RX ADMIN — AMPICILLIN SODIUM AND SULBACTAM SODIUM 200 GRAM(S): 250; 125 INJECTION, POWDER, FOR SUSPENSION INTRAMUSCULAR; INTRAVENOUS at 23:51

## 2022-09-30 RX ADMIN — AMPICILLIN SODIUM AND SULBACTAM SODIUM 200 GRAM(S): 250; 125 INJECTION, POWDER, FOR SUSPENSION INTRAMUSCULAR; INTRAVENOUS at 13:59

## 2022-09-30 RX ADMIN — Medication 250 MILLIGRAM(S): at 14:55

## 2022-09-30 NOTE — CHART NOTE - NSCHARTNOTEFT_GEN_A_CORE
Patient seen and examined bedside.     Vital Signs Last 24 Hrs  T(C): 36.7 (30 Sep 2022 19:16), Max: 36.8 (30 Sep 2022 11:00)  T(F): 98 (30 Sep 2022 19:16), Max: 98.2 (30 Sep 2022 11:00)  HR: 66 (30 Sep 2022 19:16) (66 - 99)  BP: 129/75 (30 Sep 2022 19:16) (126/69 - 151/93)  BP(mean): 86 (30 Sep 2022 19:16) (86 - 94)  RR: 18 (30 Sep 2022 19:16) (17 - 18)  SpO2: 97% (30 Sep 2022 19:16) (95% - 99%)    Parameters below as of 30 Sep 2022 11:00  Patient On (Oxygen Delivery Method): room air Patient seen and examined bedside.   complaining of b/l neck pain     Vital Signs Last 24 Hrs  T(C): 36.7 (30 Sep 2022 19:16), Max: 36.8 (30 Sep 2022 11:00)  T(F): 98 (30 Sep 2022 19:16), Max: 98.2 (30 Sep 2022 11:00)  HR: 66 (30 Sep 2022 19:16) (66 - 99)  BP: 129/75 (30 Sep 2022 19:16) (126/69 - 151/93)  BP(mean): 86 (30 Sep 2022 19:16) (86 - 94)  RR: 18 (30 Sep 2022 19:16) (17 - 18)  SpO2: 97% (30 Sep 2022 19:16) (95% - 99%)    Parameters below as of 30 Sep 2022 11:00  Patient On (Oxygen Delivery Method): room air    GENERAL: NAD well-developed, no increased WOB, mild tremors   HEAD:  Atraumatic, Normocephalic  EYES: EOMI, PERRLA, conjunctiva and sclera clear  ENMT: No tonsillar erythema, exudates, or enlargement; dry mucous membranes , b/l parotid enlargement with lymphadenoapthy anterior and posterior neck   NECK: Supple, No JVD   NERVOUS SYSTEM:  Alert & Oriented X3, Good concentration;  moving all extremities   CHEST/LUNG: CTAB;  No rales, rhonchi, wheezing, or rubs  HEART: Regular rate and rhythm; No murmurs, rubs, or gallops  ABDOMEN: Soft, Nontender, Nondistended; Bowel sounds present  EXTREMITIES:  2+ Peripheral Pulses b/l, No clubbing, cyanosis, or edema b/l   LYMPH: No lymphadenopathy   SKIN: No rashes or lesions    obtain CT maxillofacial, blood Cx   ID consult   Vancomycin x 1   continue with unasyn   CIWA protocol   ativan

## 2022-09-30 NOTE — CONSULT NOTE ADULT - SUBJECTIVE AND OBJECTIVE BOX
[facility]  INFECTIOUS DISEASES   [address] [place] [address]  Tel: [phone]     Fax: [phone]  ======================================================  Douglas Geronimo,MD Jimi, DO Calli Mercedes, RIVKA   ======================================================    VANDANA VILLA  MRN-[id number]        Patient is a 57y old  Male who presents with a chief complaint of alcohol gastritis, withdrawal (30 Sep 2022 05:21)      HPI:  Patient is a 57M with a PMH of chronic ETOH abuse with hx of alcohol withdrawal and DTs with frequent hospital admissions, alcoholic gastritis/esophagitis, PUD, HLD, hx of hypoxic respiratory failure requiring intubation due to aspiration PNA who presents to the ED for abdominal pain and vomiting.  Patient reports significant abdominal pain after drinking alcohol.  Patient unspecific about the amount of drinks per day or when his last drink was.  Noted to be withdrawn in ED.  Vitals stable, labs show lactic acidosis.  Will admit to tele.  (30 Sep 2022 05:21)      ID consulted for workup and antibiotic management     PAST MEDICAL & SURGICAL HISTORY:  PUD (peptic ulcer disease)      Mixed hyperlipidemia      EtOH dependence      Gastritis      Substance abuse      DTs (delirium tremens)      No significant past surgical history          Allergies  No Known Allergies        ANTIMICROBIALS:      MEDICATIONS  (STANDING):  ampicillin/sulbactam  IVPB   200 mL/Hr IV Intermittent (09-30-22 @ 13:59)    vancomycin  IVPB   250 mL/Hr IV Intermittent (09-30-22 @ 14:55)        OTHER MEDS: MEDICATIONS  (STANDING):  LORazepam   Injectable 4 every 6 hours  LORazepam   Injectable 4 every 1 hour PRN  morphine  - Injectable 2 every 4 hours PRN  ondansetron Injectable 4 every 6 hours PRN  pantoprazole  Injectable 40 daily      SOCIAL HISTORY:       FAMILY HISTORY:  FH: HTN (hypertension)        REVIEW OF SYSTEMS  [  ] ROS unobtainable because:    [  ] All other systems negative except as noted below:	    Constitutional:  [ ] fever [ ] chills  [ ] weight loss  [ ] weakness  Skin:  [ ] rash [ ] phlebitis	  Eyes: [ ] icterus [ ] pain  [ ] discharge	  ENMT: [ ] sore throat  [ ] thrush [ ] ulcers [ ] exudates  Respiratory: [ ] dyspnea [ ] hemoptysis [ ] cough [ ] sputum	  Cardiovascular:  [ ] chest pain [ ] palpitations [ ] edema	  Gastrointestinal:  [ ] nausea [ ] vomiting [ ] diarrhea [ ] constipation [ ] pain	  Genitourinary:  [ ] dysuria [ ] frequency [ ] hematuria [ ] discharge [ ] flank pain  [ ] incontinence  Musculoskeletal:  [ ] myalgias [ ] arthralgias [ ] arthritis  [ ] back pain  Neurological:  [ ] headache [ ] seizures  [ ] confusion/altered mental status  Psychiatric:  [ ] anxiety [ ] depression	  Hematology/Lymphatics:  [ ] lymphadenopathy  Endocrine:  [ ] adrenal [ ] thyroid  Allergic/Immunologic:	 [ ] transplant [ ] seasonal    Vital Signs Last 24 Hrs  T(F): 98.2 (09-30-22 @ 11:00), Max: 98.5 (09-29-22 @ 18:15)    Vital Signs Last 24 Hrs  HR: 75 (09-30-22 @ 11:00) (71 - 100)  BP: 137/72 (09-30-22 @ 11:00) (93/68 - 156/93)  RR: 18 (09-30-22 @ 11:00)  SpO2: 97% (09-30-22 @ 11:00) (96% - 99%)  Wt(kg): --    PHYSICAL EXAM:  Constitutional: non-toxic, no distress  HEAD/EYES: anicteric, no conjunctival injection  ENT:  supple, no thrush  Cardiovascular:   normal S1, S2, no murmur, no edema  Respiratory:  clear BS bilaterally, no wheezes, no rales  GI:  soft, non-tender, normal bowel sounds  :  no saxena, no CVA tenderness  Musculoskeletal:  no synovitis, normal ROM  Neurologic: awake and alert, normal strength, no focal findings  Skin:  no rash, no erythema, no phlebitis  Heme/Onc: no lymphadenopathy   Psychiatric:  awake, alert, appropriate mood          WBC Count: 3.63 K/uL (09-30 @ 14:25)  WBC Count: 3.26 K/uL (09-29 @ 20:07)      Auto Neutrophil %: 58.6 % (09-30-22 @ 14:25)  Auto Neutrophil #: 2.13 K/uL (09-30-22 @ 14:25)  Auto Neutrophil %: 40.8 % *L* (09-29-22 @ 20:07)  Auto Neutrophil #: 1.33 K/uL *L* (09-29-22 @ 20:07)                            11.0   3.63  )-----------( 170      ( 30 Sep 2022 14:25 )             35.5       09-29    143  |  106  |  13  ----------------------------<  98  3.8   |  22  |  1.07    Ca    8.7      29 Sep 2022 20:07  Phos  1.8     09-30    TPro  9.0<H>  /  Alb  3.9  /  TBili  0.4  /  DBili  x   /  AST  42<H>  /  ALT  45  /  AlkPhos  72  09-29      Creatinine Trend: 1.07<--, 0.94<--, 0.96<--, 0.91<--, 1.04<--        Lactate, Blood: 1.1 mmol/L (09-30-22 @ 14:30)  Lactate, Blood: 4.5 mmol/L (09-30-22 @ 10:35)  Lactate, Blood: 5.1 mmol/L (09-30-22 @ 02:19)  Lactate, Blood: 4.8 mmol/L (09-29-22 @ 23:53)  Lactate, Blood: 5.7 mmol/L (09-29-22 @ 20:07)      MICROBIOLOGY:          v                      SARS-CoV-2 Result: NotDetec (09-29-22 @ 20:07)      RADIOLOGY:   [facility]  INFECTIOUS DISEASES   [address] [place] [address]  Tel: [phone]     Fax: [phone]  ======================================================  Humphrey MD Jonathan G[place]lek, DO Calli Mercedes, RIVKA   ======================================================    VANDANA VILLA  [id number]        Patient is a 57y old  Male who presents with a chief complaint of alcohol gastritis, withdrawal (30 Sep 2022 05:21)      HPI:  Patient is a 57M with a PMH of chronic ETOH abuse with hx of alcohol withdrawal and DTs with frequent hospital admissions, alcoholic gastritis/esophagitis, PUD, HLD, hx of hypoxic respiratory failure requiring intubation due to aspiration PNA who presents to the ED for abdominal pain and vomiting.  Patient reports significant abdominal pain after drinking alcohol.  Patient unspecific about the amount of drinks per day or when his last drink was.  Noted to be withdrawn in ED.  Vitals stable, labs show lactic acidosis.  Will admit to tele.  (30 Sep 2022 05:21)    poor historian. reports no drinking in 2 months to me,. reports pain in his right cheek, neck and back of head. Denies rest of ROS   ID consulted for workup and antibiotic management     PAST MEDICAL & SURGICAL HISTORY:  PUD (peptic ulcer disease)      Mixed hyperlipidemia      EtOH dependence      Gastritis      Substance abuse      DTs (delirium tremens)      No significant past surgical history          Allergies  No Known Allergies        ANTIMICROBIALS:      MEDICATIONS  (STANDING):  ampicillin/sulbactam  IVPB   200 mL/Hr IV Intermittent (09-30-22 @ 13:59)    vancomycin  IVPB   250 mL/Hr IV Intermittent (09-30-22 @ 14:55)        OTHER MEDS: MEDICATIONS  (STANDING):  LORazepam   Injectable 4 every 6 hours  LORazepam   Injectable 4 every 1 hour PRN  morphine  - Injectable 2 every 4 hours PRN  ondansetron Injectable 4 every 6 hours PRN  pantoprazole  Injectable 40 daily      SOCIAL HISTORY:     +ETOH  FAMILY HISTORY:  FH: HTN (hypertension)        REVIEW OF SYSTEMS  [  ] ROS unobtainable because:    [X  ] All other systems negative except as noted below:	    Constitutional:  [ ] fever [ ] chills  [ ] weight loss  [ ] weakness  Skin:  [ ] rash [ ] phlebitis	  Eyes: [ ] icterus [ ] pain  [ ] discharge	  ENMT: [ ] sore throat  [ ] thrush [ ] ulcers [ ] exudates[x] painful neck and right  face  Respiratory: [ ] dyspnea [ ] hemoptysis [ ] cough [ ] sputum	  Cardiovascular:  [ ] chest pain [ ] palpitations [ ] edema	  Gastrointestinal:  [ ] nausea [ ] vomiting [ ] diarrhea [ ] constipation [ ] pain	  Genitourinary:  [ ] dysuria [ ] frequency [ ] hematuria [ ] discharge [ ] flank pain  [ ] incontinence  Musculoskeletal:  [ ] myalgias [ ] arthralgias [ ] arthritis  [ ] back pain  Neurological:  [X ] headache [ ] seizures  [ ] confusion/altered mental status  Psychiatric:  [ ] anxiety [ ] depression	  Hematology/Lymphatics:  [ ] lymphadenopathy  Endocrine:  [ ] adrenal [ ] thyroid  Allergic/Immunologic:	 [ ] transplant [ ] seasonal    Vital Signs Last 24 Hrs  T(F): 98.2 (09-30-22 @ 11:00), Max: 98.5 (09-29-22 @ 18:15)    Vital Signs Last 24 Hrs  HR: 75 (09-30-22 @ 11:00) (71 - 100)  BP: 137/72 (09-30-22 @ 11:00) (93/68 - 156/93)  RR: 18 (09-30-22 @ 11:00)  SpO2: 97% (09-30-22 @ 11:00) (96% - 99%)  Wt(kg): --    PHYSICAL EXAM:  Constitutional: non-toxic, no distress  HEAD/EYES: anicteric, no conjunctival injection  ENT:  supple, no thrush, facial fullness on the right   Cardiovascular:   normal S1, S2, no murmur, no edema  Respiratory:  clear BS bilaterally, no wheezes, no rales  GI:  soft, non-tender, normal bowel sounds  :  no saxena, no CVA tenderness  Musculoskeletal:  no synovitis, normal ROM  Neurologic: awake and alert, normal strength, no focal findings  Skin:  no rash, no erythema, no phlebitis  Heme/Onc: + cervical lymphadenopathy   Psychiatric:  awake, alert, appropriate mood          WBC Count: 3.63 K/uL (09-30 @ 14:25)  WBC Count: 3.26 K/uL (09-29 @ 20:07)      Auto Neutrophil %: 58.6 % (09-30-22 @ 14:25)  Auto Neutrophil #: 2.13 K/uL (09-30-22 @ 14:25)  Auto Neutrophil %: 40.8 % *L* (09-29-22 @ 20:07)  Auto Neutrophil #: 1.33 K/uL *L* (09-29-22 @ 20:07)                            11.0   3.63  )-----------( 170      ( 30 Sep 2022 14:25 )             35.5       09-29    143  |  106  |  13  ----------------------------<  98  3.8   |  22  |  1.07    Ca    8.7      29 Sep 2022 20:07  Phos  1.8     09-30    TPro  9.0<H>  /  Alb  3.9  /  TBili  0.4  /  DBili  x   /  AST  42<H>  /  ALT  45  /  AlkPhos  72  09-29      Creatinine Trend: 1.07<--, 0.94<--, 0.96<--, 0.91<--, 1.04<--        Lactate, Blood: 1.1 mmol/L (09-30-22 @ 14:30)  Lactate, Blood: 4.5 mmol/L (09-30-22 @ 10:35)  Lactate, Blood: 5.1 mmol/L (09-30-22 @ 02:19)  Lactate, Blood: 4.8 mmol/L (09-29-22 @ 23:53)  Lactate, Blood: 5.7 mmol/L (09-29-22 @ 20:07)      MICROBIOLOGY:    SARS-CoV-2 Result: NotDetec (09-29-22 @ 20:07)      RADIOLOGY:

## 2022-09-30 NOTE — PATIENT PROFILE ADULT - FALL HARM RISK - HARM RISK INTERVENTIONS
Assistance with ambulation/Assistance OOB with selected safe patient handling equipment/Communicate Risk of Fall with Harm to all staff/Monitor for mental status changes/Reinforce activity limits and safety measures with patient and family/Reorient to person, place and time as needed/Sit up slowly, dangle for a short time, stand at bedside before walking/Tailored Fall Risk Interventions/Use of alarms - bed, chair and/or voice tab/Visual Cue: Yellow wristband and red socks/Bed in lowest position, wheels locked, appropriate side rails in place/Call bell, personal items and telephone in reach/Instruct patient to call for assistance before getting out of bed or chair/Non-slip footwear when patient is out of bed/[place] to call system/Physically safe environment - no spills, clutter or unnecessary equipment/Purposeful Proactive Rounding/Room/bathroom lighting operational, light cord in reach

## 2022-09-30 NOTE — ED ADULT NURSE REASSESSMENT NOTE - NS ED NURSE REASSESS COMMENT FT1
Patient received on stretcher sitting up awake and alert breathing unlabored on room air in no acute distress. No tremors noted, pt denies any discomforts. IVF NS infusing as per order, continuous monitoring in place.

## 2022-09-30 NOTE — H&P ADULT - NSHPPHYSICALEXAM_GEN_ALL_CORE
Physical exam:  General: patient in no acute distress, resting comfortably  Head:  Atraumatic, Normocephalic  Eyes: EOMI, PERRLA, clear sclera  Neck: Supple, thyroid nontender, non enlarged  Cardio: S1/S2 +ve, regular rate and rhythm, no M/G/R  Resp: clear to ausculation bilaterally, no rales or wheezes  GI: abdomen soft, minimal tenderness to palpation, non distended, no guarding, BS +ve x 4  Ext: no significant pedal edema  Neuro: CN 2-12 intact, no significant motor or sensory deficits.  Skin: No rashes or lesions

## 2022-09-30 NOTE — H&P ADULT - NSHPLABSRESULTS_GEN_ALL_CORE
Recent Vitals  T(C): 36.7 (09-30-22 @ 05:07), Max: 36.9 (09-29-22 @ 18:15)  HR: 88 (09-30-22 @ 05:07) (88 - 100)  BP: 150/77 (09-30-22 @ 05:07) (93/68 - 156/93)  RR: 18 (09-30-22 @ 05:07) (16 - 20)  SpO2: 97% (09-30-22 @ 05:07) (97% - 99%)                        14.8   3.26  )-----------( 212      ( 29 Sep 2022 20:07 )             48.9     09-29    143  |  106  |  13  ----------------------------<  98  3.8   |  22  |  1.07    Ca    8.7      29 Sep 2022 20:07    TPro  9.0<H>  /  Alb  3.9  /  TBili  0.4  /  DBili  x   /  AST  42<H>  /  ALT  45  /  AlkPhos  72  09-29      LIVER FUNCTIONS - ( 29 Sep 2022 20:07 )  Alb: 3.9 g/dL / Pro: 9.0 gm/dL / ALK PHOS: 72 U/L / ALT: 45 U/L / AST: 42 U/L / GGT: x               Home Medications:  pantoprazole 40 mg oral delayed release tablet: 1 tab(s) orally once a day (before a meal) (23 Sep 2022 10:46)  sucralfate 1 g oral tablet: 1 tab(s) orally 4 times a day (23 Sep 2022 10:46)

## 2022-09-30 NOTE — H&P ADULT - PROBLEM SELECTOR PLAN 1
CECILIA currently 5, recently received ativan  Will continue withdrawal protocol with Ativan 4 q6 standing  Ativan 4 mg IVP PRN for acute withdrawal symptoms  Thiamine, Folate, MVI daily

## 2022-09-30 NOTE — H&P ADULT - ASSESSMENT
Patient is a 57M with a PMH of chronic ETOH abuse with hx of alcohol withdrawal and DTs with frequent hospital admissions, alcoholic gastritis/esophagitis, PUD, HLD, hx of hypoxic respiratory failure requiring intubation due to aspiration PNA who presents to the ED for abdominal pain and vomiting.  Patient reports significant abdominal pain after drinking alcohol.  Patient unspecific about the amount of drinks per day or when his last drink was.  Noted to be withdrawin in ED.  Vitals stable, labs show lactic acidosis.  Will admit to tele.     IMPROVE VTE Individual Risk Assessment          RISK                                                          Points  [  ] Previous VTE                                                3  [  ] Thrombophilia                                             2  [  ] Lower limb paralysis                                    2        (unable to hold up >15 seconds)    [  ] Current Cancer                                             2         (within 6 months)  [  ] Immobilization > 24 hrs                              1  [  ] ICU/CCU stay > 24 hours                            1  [  ] Age > 60                                                    1    IMPROVE VTE Score - 1

## 2022-09-30 NOTE — H&P ADULT - HISTORY OF PRESENT ILLNESS
Patient is a 57M with a PMH of chronic ETOH abuse with hx of alcohol withdrawal and DTs with frequent hospital admissions, alcoholic gastritis/esophagitis, PUD, HLD, hx of hypoxic respiratory failure requiring intubation due to aspiration PNA who presents to the ED for abdominal pain and vomiting.  Patient reports significant abdominal pain after drinking alcohol.  Patient unspecific about the amount of drinks per day or when his last drink was.  Noted to be withdrawn in ED.  Vitals stable, labs show lactic acidosis.  Will admit to tele.

## 2022-10-01 LAB
ANION GAP SERPL CALC-SCNC: 6 MMOL/L — SIGNIFICANT CHANGE UP (ref 5–17)
BASOPHILS # BLD AUTO: 0.01 K/UL — SIGNIFICANT CHANGE UP (ref 0–0.2)
BASOPHILS NFR BLD AUTO: 0.4 % — SIGNIFICANT CHANGE UP (ref 0–2)
BUN SERPL-MCNC: 8 MG/DL — SIGNIFICANT CHANGE UP (ref 7–23)
CALCIUM SERPL-MCNC: 8.6 MG/DL — SIGNIFICANT CHANGE UP (ref 8.5–10.1)
CHLORIDE SERPL-SCNC: 105 MMOL/L — SIGNIFICANT CHANGE UP (ref 96–108)
CO2 SERPL-SCNC: 27 MMOL/L — SIGNIFICANT CHANGE UP (ref 22–31)
CREAT SERPL-MCNC: 0.92 MG/DL — SIGNIFICANT CHANGE UP (ref 0.5–1.3)
EGFR: 98 ML/MIN/1.73M2 — SIGNIFICANT CHANGE UP
EOSINOPHIL # BLD AUTO: 0.13 K/UL — SIGNIFICANT CHANGE UP (ref 0–0.5)
EOSINOPHIL NFR BLD AUTO: 4.6 % — SIGNIFICANT CHANGE UP (ref 0–6)
GLUCOSE SERPL-MCNC: 85 MG/DL — SIGNIFICANT CHANGE UP (ref 70–99)
HCT VFR BLD CALC: 32.7 % — LOW (ref 39–50)
HGB BLD-MCNC: 9.9 G/DL — LOW (ref 13–17)
IMM GRANULOCYTES NFR BLD AUTO: 0.4 % — SIGNIFICANT CHANGE UP (ref 0–0.9)
LYMPHOCYTES # BLD AUTO: 0.87 K/UL — LOW (ref 1–3.3)
LYMPHOCYTES # BLD AUTO: 31 % — SIGNIFICANT CHANGE UP (ref 13–44)
MAGNESIUM SERPL-MCNC: 1.6 MG/DL — SIGNIFICANT CHANGE UP (ref 1.6–2.6)
MCHC RBC-ENTMCNC: 22.5 PG — LOW (ref 27–34)
MCHC RBC-ENTMCNC: 30.3 G/DL — LOW (ref 32–36)
MCV RBC AUTO: 74.3 FL — LOW (ref 80–100)
MONOCYTES # BLD AUTO: 0.29 K/UL — SIGNIFICANT CHANGE UP (ref 0–0.9)
MONOCYTES NFR BLD AUTO: 10.3 % — SIGNIFICANT CHANGE UP (ref 2–14)
NEUTROPHILS # BLD AUTO: 1.5 K/UL — LOW (ref 1.8–7.4)
NEUTROPHILS NFR BLD AUTO: 53.3 % — SIGNIFICANT CHANGE UP (ref 43–77)
NRBC # BLD: 0 /100 WBCS — SIGNIFICANT CHANGE UP (ref 0–0)
PHOSPHATE SERPL-MCNC: 2.1 MG/DL — LOW (ref 2.5–4.5)
PLATELET # BLD AUTO: 176 K/UL — SIGNIFICANT CHANGE UP (ref 150–400)
POTASSIUM SERPL-MCNC: 3.6 MMOL/L — SIGNIFICANT CHANGE UP (ref 3.5–5.3)
POTASSIUM SERPL-SCNC: 3.6 MMOL/L — SIGNIFICANT CHANGE UP (ref 3.5–5.3)
RBC # BLD: 4.4 M/UL — SIGNIFICANT CHANGE UP (ref 4.2–5.8)
RBC # FLD: 19.1 % — HIGH (ref 10.3–14.5)
SODIUM SERPL-SCNC: 138 MMOL/L — SIGNIFICANT CHANGE UP (ref 135–145)
WBC # BLD: 2.81 K/UL — LOW (ref 3.8–10.5)
WBC # FLD AUTO: 2.81 K/UL — LOW (ref 3.8–10.5)

## 2022-10-01 PROCEDURE — 99291 CRITICAL CARE FIRST HOUR: CPT

## 2022-10-01 RX ORDER — FOLIC ACID 0.8 MG
1 TABLET ORAL DAILY
Refills: 0 | Status: DISCONTINUED | OUTPATIENT
Start: 2022-10-01 | End: 2022-10-14

## 2022-10-01 RX ORDER — DEXMEDETOMIDINE HYDROCHLORIDE IN 0.9% SODIUM CHLORIDE 4 UG/ML
0.69 INJECTION INTRAVENOUS
Qty: 200 | Refills: 0 | Status: DISCONTINUED | OUTPATIENT
Start: 2022-10-01 | End: 2022-10-03

## 2022-10-01 RX ORDER — HALOPERIDOL DECANOATE 100 MG/ML
5 INJECTION INTRAMUSCULAR ONCE
Refills: 0 | Status: COMPLETED | OUTPATIENT
Start: 2022-10-01 | End: 2022-10-01

## 2022-10-01 RX ORDER — MAGNESIUM SULFATE 500 MG/ML
2 VIAL (ML) INJECTION ONCE
Refills: 0 | Status: COMPLETED | OUTPATIENT
Start: 2022-10-01 | End: 2022-10-01

## 2022-10-01 RX ORDER — PHENOBARBITAL 60 MG
260 TABLET ORAL ONCE
Refills: 0 | Status: DISCONTINUED | OUTPATIENT
Start: 2022-10-01 | End: 2022-10-01

## 2022-10-01 RX ORDER — THIAMINE MONONITRATE (VIT B1) 100 MG
100 TABLET ORAL DAILY
Refills: 0 | Status: COMPLETED | OUTPATIENT
Start: 2022-10-01 | End: 2022-10-04

## 2022-10-01 RX ORDER — ENOXAPARIN SODIUM 100 MG/ML
40 INJECTION SUBCUTANEOUS EVERY 24 HOURS
Refills: 0 | Status: DISCONTINUED | OUTPATIENT
Start: 2022-10-01 | End: 2022-10-17

## 2022-10-01 RX ORDER — PHENOBARBITAL 60 MG
130 TABLET ORAL ONCE
Refills: 0 | Status: DISCONTINUED | OUTPATIENT
Start: 2022-10-01 | End: 2022-10-01

## 2022-10-01 RX ORDER — PHENOBARBITAL 60 MG
130 TABLET ORAL EVERY 6 HOURS
Refills: 0 | Status: DISCONTINUED | OUTPATIENT
Start: 2022-10-01 | End: 2022-10-05

## 2022-10-01 RX ORDER — DEXMEDETOMIDINE HYDROCHLORIDE IN 0.9% SODIUM CHLORIDE 4 UG/ML
0.7 INJECTION INTRAVENOUS
Qty: 200 | Refills: 0 | Status: DISCONTINUED | OUTPATIENT
Start: 2022-10-01 | End: 2022-10-01

## 2022-10-01 RX ORDER — PHENOBARBITAL 60 MG
130 TABLET ORAL
Refills: 0 | Status: DISCONTINUED | OUTPATIENT
Start: 2022-10-01 | End: 2022-10-03

## 2022-10-01 RX ORDER — POTASSIUM PHOSPHATE, MONOBASIC POTASSIUM PHOSPHATE, DIBASIC 236; 224 MG/ML; MG/ML
15 INJECTION, SOLUTION INTRAVENOUS ONCE
Refills: 0 | Status: COMPLETED | OUTPATIENT
Start: 2022-10-01 | End: 2022-10-01

## 2022-10-01 RX ADMIN — Medication 4 MILLIGRAM(S): at 14:33

## 2022-10-01 RX ADMIN — AMPICILLIN SODIUM AND SULBACTAM SODIUM 200 GRAM(S): 250; 125 INJECTION, POWDER, FOR SUSPENSION INTRAMUSCULAR; INTRAVENOUS at 06:10

## 2022-10-01 RX ADMIN — Medication 100 MILLIGRAM(S): at 13:51

## 2022-10-01 RX ADMIN — POTASSIUM PHOSPHATE, MONOBASIC POTASSIUM PHOSPHATE, DIBASIC 62.5 MILLIMOLE(S): 236; 224 INJECTION, SOLUTION INTRAVENOUS at 12:55

## 2022-10-01 RX ADMIN — DEXMEDETOMIDINE HYDROCHLORIDE IN 0.9% SODIUM CHLORIDE 14.3 MICROGRAM(S)/KG/HR: 4 INJECTION INTRAVENOUS at 18:12

## 2022-10-01 RX ADMIN — HALOPERIDOL DECANOATE 5 MILLIGRAM(S): 100 INJECTION INTRAMUSCULAR at 11:05

## 2022-10-01 RX ADMIN — AMPICILLIN SODIUM AND SULBACTAM SODIUM 200 GRAM(S): 250; 125 INJECTION, POWDER, FOR SUSPENSION INTRAMUSCULAR; INTRAVENOUS at 12:23

## 2022-10-01 RX ADMIN — Medication 260 MILLIGRAM(S): at 11:15

## 2022-10-01 RX ADMIN — Medication 260 MILLIGRAM(S): at 09:24

## 2022-10-01 RX ADMIN — PANTOPRAZOLE SODIUM 40 MILLIGRAM(S): 20 TABLET, DELAYED RELEASE ORAL at 12:22

## 2022-10-01 RX ADMIN — Medication 1 MILLIGRAM(S): at 13:52

## 2022-10-01 RX ADMIN — Medication 4 MILLIGRAM(S): at 07:34

## 2022-10-01 RX ADMIN — Medication 25 GRAM(S): at 15:44

## 2022-10-01 RX ADMIN — Medication 130 MILLIGRAM(S): at 08:46

## 2022-10-01 RX ADMIN — HALOPERIDOL DECANOATE 5 MILLIGRAM(S): 100 INJECTION INTRAMUSCULAR at 09:59

## 2022-10-01 RX ADMIN — Medication 260 MILLIGRAM(S): at 10:08

## 2022-10-01 RX ADMIN — ENOXAPARIN SODIUM 40 MILLIGRAM(S): 100 INJECTION SUBCUTANEOUS at 13:52

## 2022-10-01 RX ADMIN — AMPICILLIN SODIUM AND SULBACTAM SODIUM 200 GRAM(S): 250; 125 INJECTION, POWDER, FOR SUSPENSION INTRAMUSCULAR; INTRAVENOUS at 18:12

## 2022-10-01 RX ADMIN — Medication 4 MILLIGRAM(S): at 09:13

## 2022-10-01 NOTE — CONSULT NOTE ADULT - SUBJECTIVE AND OBJECTIVE BOX
57 yo M h/o  chronic ETOH abuse ,  with   h/o / DTs with frequent hospital admissions, HLD, alcoholic gastritis. h/o poor peripheral acces &  prior  hx of hypoxic respiratory failure requiring intubation due to aspiration PNA,. h/o   c/c  tachycardia h/o mlple icu visits,  needing   Precedex gtt and phenobarb on 3/5./21. 4/25/21  and 4/29/ /21.  Pt  is  unemployed ,  and,  states he  drinks  vodka  and  whiskey / [employer], per pt  Patient  admitted with  *    ETOH  intoxication/  and  impending DT's,  pt/ drinks   vodka  and  [employer].            *   Lactic acidosis due to dehydration  and  etoh  toxicity            lactate  was  8,  now  is  4,  on iv  fluids              pt has ,  AALA.  alcohol associated   lactic acidosis             on    CIWA   protocol              on iv  fluids.  thiamine,  protonix.             CT  A/P  on 6/21 , with esophagitis, hepatic  steatosis              CT  A/P  9/18/21, gastritis/  was  seen by  gi  dr dumont in past    *    elevated AST / ALT, from etoh hepatitis   *      c/c  leukopenia, suspect  from etoh  induced  bone  marrow suppression    *       prior  h/o   c/c   tachycardia ,  from etoh abuse/  withdrawal symptoms          hr is 89 now    *     HLD, ,  pt  is non compliant  with his meds,    on dvt ppx

## 2022-10-01 NOTE — CONSULT NOTE ADULT - ASSESSMENT
Patient is a 57M with a PMH of chronic ETOH abuse with hx of alcohol withdrawal and DTs with frequent hospital admissions, alcoholic gastritis/esophagitis, PUD, HLD, hx of hypoxic respiratory failure requiring intubation due to aspiration PNA who presents to the ED for abdominal pain and vomiting.  Patient reports significant abdominal pain after drinking alcohol.  Patient unspecific about the amount of drinks per day or when his last drink was.  Noted to be withdrawn in ED.  Vitals stable, labs show lactic acidosis  high lactate on admission which improved with fluids   slightly leukopenic   facial fullness and redness-? obtain CT maxofacial, blood cultures and start antibiotics        Plan:  Unasyn 3g q6hrs  vancomycin   send vancomycin trough with target AUC/NAYE 400-600   (therapeutic drug monitoring required with IV vancomycin)   blood cultures   monitor for signs of withdrawal and DTs   trend cbc and CMP       Discussed with Dr. Alvaro Tai, DO  Infectious Disease Attending  Reachable via Microsoft Teams or ID office: [phone]  After 5pm/weekends please call [phone] for all inquiries and new consults      
Patient is a 57y old  Male who presents with a chief complaint of alcohol gastritis, withdrawal (01 Oct 2022 10:15)      HPI:  Patient is a 57M with a PMH of chronic ETOH abuse with hx of alcohol withdrawal and DTs with frequent hospital admissions, alcoholic gastritis/esophagitis, PUD, HLD, hx of hypoxic respiratory failure requiring intubation due to aspiration PNA who presents to the ED for abdominal pain and vomiting.  Patient reports significant abdominal pain after drinking alcohol.  Patient unspecific about the amount of drinks per day or when his last drink was.  Noted to be withdrawn in ED.  Vitals stable, labs show lactic acidosis.  Will admit to tele.  (30 Sep 2022 05:21)        ASSESSMENT :   ==============  57 year old male with chronic ETOH abuse with frequent hospitalizations due to withdrawal with DT. PMH Alcoholic gastritis / esophagitis PUUD, HLD and Hx of hypoxic respiratory failure 2nd to aspiration PNA. Admitted to medical floor for ETOH withdrawal and abdominal pain. ICU consulted for elevated CIWA,       PLAN:  ========    NEURO:  -started on precedx ggt  -continue phenobarbital ATC   - continue thiamine, folic acid   -neuro checks per routine     PULM:  -end tidal CO@ monitoring   -maintain O2 > 92%     CV:  -Maintain MAP > 65    GI:  -NPO until full awake  - may place NGT if patient not able to take PO   - continue pantoprazole  Injectable 40 milliGRAM(s)      RENAL:  -monitor and replace electrolytes as needed   -maintain urine output > 0.5cc/kg/hr     :  CHAN yes [  ] NO [ X ] insertion date     ID:  -Right parotid enlargement-- CT maxofacial no parotidis   - continue ampicillin/sulbactam  IVPB 3 Gram(s) X 5 days total       ENDO:  -monitor blood glucose       HEME:  enoxaparin Injectable 40 milliGRAM(s)    MUSC:   -PT/OT when able to partake     Goals of Care:   Advanced Directives :

## 2022-10-01 NOTE — PROVIDER CONTACT NOTE (EICU) - SITUATION
Transferred to ICU for alcohol withdrawal/DTs, severe agitation CIWA 23. s/p ativan 4mg, phenobarbital 130mg IVP x1 and phenobarbital 260mg IVP x2. Started on precedex drip. Currently sleeping and lethargic due to sedation. on end tidal monitoring. due for PO meds kphos, thiamine, folic acid

## 2022-10-02 LAB
ANION GAP SERPL CALC-SCNC: 7 MMOL/L — SIGNIFICANT CHANGE UP (ref 5–17)
BUN SERPL-MCNC: 6 MG/DL — LOW (ref 7–23)
CALCIUM SERPL-MCNC: 8.6 MG/DL — SIGNIFICANT CHANGE UP (ref 8.5–10.1)
CHLORIDE SERPL-SCNC: 108 MMOL/L — SIGNIFICANT CHANGE UP (ref 96–108)
CO2 SERPL-SCNC: 24 MMOL/L — SIGNIFICANT CHANGE UP (ref 22–31)
CREAT SERPL-MCNC: 0.79 MG/DL — SIGNIFICANT CHANGE UP (ref 0.5–1.3)
EGFR: 105 ML/MIN/1.73M2 — SIGNIFICANT CHANGE UP
GLUCOSE SERPL-MCNC: 99 MG/DL — SIGNIFICANT CHANGE UP (ref 70–99)
HCT VFR BLD CALC: 34.7 % — LOW (ref 39–50)
HGB BLD-MCNC: 10.7 G/DL — LOW (ref 13–17)
MAGNESIUM SERPL-MCNC: 2.2 MG/DL — SIGNIFICANT CHANGE UP (ref 1.6–2.6)
MCHC RBC-ENTMCNC: 23.4 PG — LOW (ref 27–34)
MCHC RBC-ENTMCNC: 30.8 G/DL — LOW (ref 32–36)
MCV RBC AUTO: 75.9 FL — LOW (ref 80–100)
NRBC # BLD: 0 /100 WBCS — SIGNIFICANT CHANGE UP (ref 0–0)
PHOSPHATE SERPL-MCNC: 2.7 MG/DL — SIGNIFICANT CHANGE UP (ref 2.5–4.5)
PLATELET # BLD AUTO: 129 K/UL — LOW (ref 150–400)
POTASSIUM SERPL-MCNC: 3.7 MMOL/L — SIGNIFICANT CHANGE UP (ref 3.5–5.3)
POTASSIUM SERPL-SCNC: 3.7 MMOL/L — SIGNIFICANT CHANGE UP (ref 3.5–5.3)
RBC # BLD: 4.57 M/UL — SIGNIFICANT CHANGE UP (ref 4.2–5.8)
RBC # FLD: 19 % — HIGH (ref 10.3–14.5)
SODIUM SERPL-SCNC: 139 MMOL/L — SIGNIFICANT CHANGE UP (ref 135–145)
WBC # BLD: 3.06 K/UL — LOW (ref 3.8–10.5)
WBC # FLD AUTO: 3.06 K/UL — LOW (ref 3.8–10.5)

## 2022-10-02 PROCEDURE — 99291 CRITICAL CARE FIRST HOUR: CPT

## 2022-10-02 PROCEDURE — 36569 INSJ PICC 5 YR+ W/O IMAGING: CPT

## 2022-10-02 PROCEDURE — 76937 US GUIDE VASCULAR ACCESS: CPT | Mod: 26

## 2022-10-02 PROCEDURE — 36600 WITHDRAWAL OF ARTERIAL BLOOD: CPT

## 2022-10-02 PROCEDURE — 76937 US GUIDE VASCULAR ACCESS: CPT | Mod: 26,59

## 2022-10-02 RX ORDER — CHLORHEXIDINE GLUCONATE 213 G/1000ML
1 SOLUTION TOPICAL
Refills: 0 | Status: DISCONTINUED | OUTPATIENT
Start: 2022-10-02 | End: 2022-10-17

## 2022-10-02 RX ORDER — POTASSIUM CHLORIDE 20 MEQ
10 PACKET (EA) ORAL
Refills: 0 | Status: COMPLETED | OUTPATIENT
Start: 2022-10-02 | End: 2022-10-02

## 2022-10-02 RX ORDER — SODIUM CHLORIDE 9 MG/ML
1000 INJECTION, SOLUTION INTRAVENOUS ONCE
Refills: 0 | Status: COMPLETED | OUTPATIENT
Start: 2022-10-02 | End: 2022-10-02

## 2022-10-02 RX ADMIN — SODIUM CHLORIDE 1000 MILLILITER(S): 9 INJECTION, SOLUTION INTRAVENOUS at 17:10

## 2022-10-02 RX ADMIN — Medication 1 MILLIGRAM(S): at 13:04

## 2022-10-02 RX ADMIN — DEXMEDETOMIDINE HYDROCHLORIDE IN 0.9% SODIUM CHLORIDE 14.3 MICROGRAM(S)/KG/HR: 4 INJECTION INTRAVENOUS at 20:15

## 2022-10-02 RX ADMIN — ENOXAPARIN SODIUM 40 MILLIGRAM(S): 100 INJECTION SUBCUTANEOUS at 13:39

## 2022-10-02 RX ADMIN — DEXMEDETOMIDINE HYDROCHLORIDE IN 0.9% SODIUM CHLORIDE 14.3 MICROGRAM(S)/KG/HR: 4 INJECTION INTRAVENOUS at 15:37

## 2022-10-02 RX ADMIN — Medication 100 MILLIGRAM(S): at 12:09

## 2022-10-02 RX ADMIN — Medication 130 MILLIGRAM(S): at 21:52

## 2022-10-02 RX ADMIN — Medication 130 MILLIGRAM(S): at 00:25

## 2022-10-02 RX ADMIN — Medication 100 MILLIEQUIVALENT(S): at 08:30

## 2022-10-02 RX ADMIN — Medication 130 MILLIGRAM(S): at 06:27

## 2022-10-02 RX ADMIN — Medication 100 MILLIEQUIVALENT(S): at 07:41

## 2022-10-02 RX ADMIN — Medication 130 MILLIGRAM(S): at 04:34

## 2022-10-02 RX ADMIN — DEXMEDETOMIDINE HYDROCHLORIDE IN 0.9% SODIUM CHLORIDE 14.3 MICROGRAM(S)/KG/HR: 4 INJECTION INTRAVENOUS at 23:42

## 2022-10-02 RX ADMIN — DEXMEDETOMIDINE HYDROCHLORIDE IN 0.9% SODIUM CHLORIDE 14.3 MICROGRAM(S)/KG/HR: 4 INJECTION INTRAVENOUS at 10:52

## 2022-10-02 RX ADMIN — CHLORHEXIDINE GLUCONATE 1 APPLICATION(S): 213 SOLUTION TOPICAL at 06:46

## 2022-10-02 RX ADMIN — DEXMEDETOMIDINE HYDROCHLORIDE IN 0.9% SODIUM CHLORIDE 14.3 MICROGRAM(S)/KG/HR: 4 INJECTION INTRAVENOUS at 02:29

## 2022-10-02 RX ADMIN — Medication 130 MILLIGRAM(S): at 12:10

## 2022-10-02 RX ADMIN — Medication 130 MILLIGRAM(S): at 18:07

## 2022-10-02 RX ADMIN — PANTOPRAZOLE SODIUM 40 MILLIGRAM(S): 20 TABLET, DELAYED RELEASE ORAL at 12:10

## 2022-10-02 RX ADMIN — Medication 100 MILLIEQUIVALENT(S): at 06:27

## 2022-10-02 NOTE — PROGRESS NOTE ADULT - SUBJECTIVE AND OBJECTIVE BOX
CHIEF COMPLAINT:    Interval Events:    REVIEW OF SYSTEMS:  Constitutional: [ ] fevers [ ] chills [ ] weight loss [ ] weight gain  HEENT: [ ] dry eyes [ ] eye irritation [ ] postnasal drip [ ] nasal congestion  CV: [ ] chest pain [ ] orthopnea [ ] palpitations [ ] murmur  Resp: [ ] cough [ ] shortness of breath [ ] dyspnea [ ] wheezing [ ] sputum [ ] hemoptysis  GI: [ ] nausea [ ] vomiting [ ] diarrhea [ ] constipation [ ] abd pain [ ] dysphagia   : [ ] dysuria [ ] nocturia [ ] hematuria [ ] increased urinary frequency  Musculoskeletal: [ ] back pain [ ] myalgias [ ] arthralgias [ ] fracture  Skin: [ ] rash [ ] itch  Neurological: [ ] headache [ ] dizziness [ ] syncope [ ] weakness [ ] numbness  Hematologic/Lymphatic: [ ] anemia [ ] bleeding problem  Allergic/Immunologic: [ ] itchy eyes [ ] nasal discharge [ ] hives [ ] angioedema  [ ] All other systems negative  [ ] Unable to assess ROS because ________    OBJECTIVE:  ICU Vital Signs Last 24 Hrs  T(C): 36 (02 Oct 2022 07:30), Max: 37.3 (01 Oct 2022 11:38)  T(F): 96.8 (02 Oct 2022 07:30), Max: 99.2 (01 Oct 2022 11:38)  HR: 61 (02 Oct 2022 07:00) (53 - 111)  BP: 112/78 (02 Oct 2022 07:00) (96/70 - 148/86)  BP(mean): 84 (02 Oct 2022 07:00) (73 - 107)  ABP: --  ABP(mean): --  RR: 17 (02 Oct 2022 07:00) (11 - 28)  SpO2: 98% (02 Oct 2022 07:00) (95% - 100%)    O2 Parameters below as of 01 Oct 2022 11:23      O2 Concentration (%): 28          10-01 @ 07:01  -  10-02 @ 07:00  --------------------------------------------------------  IN: 459 mL / OUT: 1425 mL / NET: -966 mL      CAPILLARY BLOOD GLUCOSE          PHYSICAL EXAM:  General:   HEENT:   Neck:   Respiratory:   Cardiovascular:   Abdomen:   Extremities:   Skin:   Neurological:  Psychiatry:    LINES:    HOSPITAL MEDICATIONS:  MEDICATIONS  (STANDING):  chlorhexidine 2% Cloths 1 Application(s) Topical <User Schedule>  dexMEDEtomidine Infusion 0.692 MICROgram(s)/kG/Hr (14.3 mL/Hr) IV Continuous <Continuous>  enoxaparin Injectable 40 milliGRAM(s) SubCutaneous every 24 hours  folic acid Injectable 1 milliGRAM(s) IV Push daily  influenza   Vaccine 0.5 milliLiter(s) IntraMuscular once  multivitamin 1 Tablet(s) Oral daily  pantoprazole  Injectable 40 milliGRAM(s) IV Push daily  PHENobarbital Injectable 130 milliGRAM(s) IV Push every 6 hours  potassium chloride  10 mEq/100 mL IVPB 10 milliEquivalent(s) IV Intermittent every 1 hour  thiamine Injectable 100 milliGRAM(s) IV Push daily    MEDICATIONS  (PRN):  ondansetron Injectable 4 milliGRAM(s) IV Push every 6 hours PRN Nausea and/or Vomiting  PHENobarbital Injectable 130 milliGRAM(s) IV Push every 15 minutes PRN Ciwa > 20      LABS:                        10.7   3.06  )-----------( 129      ( 02 Oct 2022 05:37 )             34.7     Hgb Trend: 10.7<--, 9.9<--, 11.0<--, 14.8<--  10-02    139  |  108  |  6<L>  ----------------------------<  99  3.7   |  24  |  0.79    Ca    8.6      02 Oct 2022 05:37  Phos  2.7     10-02  Mg     2.2     10-02                MICROBIOLOGY:     RADIOLOGY:  [ ] Reviewed and interpreted by me CHIEF COMPLAINT:    Interval Events:  received extra dose of phenobarb for agitation  increased secretions requiring suctioning   abx discontinued    REVIEW OF SYSTEMS:  [x ] Unable to assess ROS because encephalopathy    OBJECTIVE:  ICU Vital Signs Last 24 Hrs  T(C): 36 (02 Oct 2022 07:30), Max: 37.3 (01 Oct 2022 11:38)  T(F): 96.8 (02 Oct 2022 07:30), Max: 99.2 (01 Oct 2022 11:38)  HR: 61 (02 Oct 2022 07:00) (53 - 111)  BP: 112/78 (02 Oct 2022 07:00) (96/70 - 148/86)  BP(mean): 84 (02 Oct 2022 07:00) (73 - 107)  ABP: --  ABP(mean): --  RR: 17 (02 Oct 2022 07:00) (11 - 28)  SpO2: 98% (02 Oct 2022 07:00) (95% - 100%)    O2 Parameters below as of 01 Oct 2022 11:23      O2 Concentration (%): 28          10-01 @ 07:01  -  10-02 @ 07:00  --------------------------------------------------------  IN: 459 mL / OUT: 1425 mL / NET: -966 mL      CAPILLARY BLOOD GLUCOSE          PHYSICAL EXAM:  General: NAD, non toxic appearing  HEENT: dry MM  Neck: supple, submandibular fullness  Respiratory: poor inspiratory effort  Cardiovascular: s1s2 RRR  Abdomen: soft, non tender, non distended  Extremities: warm, no edema  Skin: intact  Neurological: moves all extremities  Psychiatry: sedated    LINES:  \[facility] [place] [facility]\""    HOSPITAL MEDICATIONS:  MEDICATIONS  (STANDING):  chlorhexidine 2% Cloths 1 Application(s) Topical <User Schedule>  dexMEDEtomidine Infusion 0.692 MICROgram(s)/kG/Hr (14.3 mL/Hr) IV Continuous <Continuous>  enoxaparin Injectable 40 milliGRAM(s) SubCutaneous every 24 hours  folic acid Injectable 1 milliGRAM(s) IV Push daily  influenza   Vaccine 0.5 milliLiter(s) IntraMuscular once  multivitamin 1 Tablet(s) Oral daily  pantoprazole  Injectable 40 milliGRAM(s) IV Push daily  PHENobarbital Injectable 130 milliGRAM(s) IV Push every 6 hours  potassium chloride  10 mEq/100 mL IVPB 10 milliEquivalent(s) IV Intermittent every 1 hour  thiamine Injectable 100 milliGRAM(s) IV Push daily    MEDICATIONS  (PRN):  ondansetron Injectable 4 milliGRAM(s) IV Push every 6 hours PRN Nausea and/or Vomiting  PHENobarbital Injectable 130 milliGRAM(s) IV Push every 15 minutes PRN Ciwa > 20      LABS:                        10.7   3.06  )-----------( 129      ( 02 Oct 2022 05:37 )             34.7     Hgb Trend: 10.7<--, 9.9<--, 11.0<--, 14.8<--  10-02    139  |  108  |  6<L>  ----------------------------<  99  3.7   |  24  |  0.79    Ca    8.6      02 Oct 2022 05:37  Phos  2.7     10-02  Mg     2.2     10-02                MICROBIOLOGY:     RADIOLOGY:  [ ] Reviewed and interpreted by me

## 2022-10-02 NOTE — PROGRESS NOTE ADULT - ASSESSMENT
56M w/ chronic EtOH abuse and multiple admissions for DTs, alcoholic gastritis, multiple episodes of aspiration pna requiring intubations. Presents w/ abdominal pain and vomiting. Admitted to medical floors for withdrawal symptoms and was started on antibiotics for possible partotitis. Transferred to ICU for worsening agitation requiring precedex and phenobarb.    - continue w/ phenobarb 130 q6 w/ PRN doses; continue w/ precedex for agitation  - airway monitoring while sedated, EtCO2 in place  - noted to have parotid and submandibular swelling and redness on exam, was started on abx empirically for possible partitits - CT revealed no acute findings - off abx now  - electrolytes acceptable; monitor electrolytes closely  - keep NPO when sleepy, protonix   - folic acid and thiamine  - monitor in ICU while withdrawing and on precedex  - lovenox for DVT ppx  - full code

## 2022-10-02 NOTE — ED ADULT NURSE NOTE - ED STAT RN HANDOFF WHERE 2
No
Ed bed 5
Topical Clindamycin Pregnancy And Lactation Text: This medication is Pregnancy Category B and is considered safe during pregnancy. It is unknown if it is excreted in breast milk.

## 2022-10-03 LAB
ALBUMIN SERPL ELPH-MCNC: 2.8 G/DL — LOW (ref 3.3–5)
ALP SERPL-CCNC: 56 U/L — SIGNIFICANT CHANGE UP (ref 40–120)
ALT FLD-CCNC: 25 U/L — SIGNIFICANT CHANGE UP (ref 12–78)
ANION GAP SERPL CALC-SCNC: 8 MMOL/L — SIGNIFICANT CHANGE UP (ref 5–17)
AST SERPL-CCNC: 21 U/L — SIGNIFICANT CHANGE UP (ref 15–37)
BILIRUB SERPL-MCNC: 0.7 MG/DL — SIGNIFICANT CHANGE UP (ref 0.2–1.2)
BUN SERPL-MCNC: 9 MG/DL — SIGNIFICANT CHANGE UP (ref 7–23)
CALCIUM SERPL-MCNC: 8.8 MG/DL — SIGNIFICANT CHANGE UP (ref 8.5–10.1)
CHLORIDE SERPL-SCNC: 105 MMOL/L — SIGNIFICANT CHANGE UP (ref 96–108)
CO2 SERPL-SCNC: 25 MMOL/L — SIGNIFICANT CHANGE UP (ref 22–31)
CREAT SERPL-MCNC: 1.12 MG/DL — SIGNIFICANT CHANGE UP (ref 0.5–1.3)
EGFR: 78 ML/MIN/1.73M2 — SIGNIFICANT CHANGE UP
GLUCOSE SERPL-MCNC: 97 MG/DL — SIGNIFICANT CHANGE UP (ref 70–99)
HCT VFR BLD CALC: 36.8 % — LOW (ref 39–50)
HGB BLD-MCNC: 11.5 G/DL — LOW (ref 13–17)
MAGNESIUM SERPL-MCNC: 2.1 MG/DL — SIGNIFICANT CHANGE UP (ref 1.6–2.6)
MCHC RBC-ENTMCNC: 23.4 PG — LOW (ref 27–34)
MCHC RBC-ENTMCNC: 31.3 G/DL — LOW (ref 32–36)
MCV RBC AUTO: 74.9 FL — LOW (ref 80–100)
NRBC # BLD: 0 /100 WBCS — SIGNIFICANT CHANGE UP (ref 0–0)
PHOSPHATE SERPL-MCNC: 2.4 MG/DL — LOW (ref 2.5–4.5)
PLATELET # BLD AUTO: 145 K/UL — LOW (ref 150–400)
POTASSIUM SERPL-MCNC: 3.5 MMOL/L — SIGNIFICANT CHANGE UP (ref 3.5–5.3)
POTASSIUM SERPL-SCNC: 3.5 MMOL/L — SIGNIFICANT CHANGE UP (ref 3.5–5.3)
PROT SERPL-MCNC: 7 GM/DL — SIGNIFICANT CHANGE UP (ref 6–8.3)
RBC # BLD: 4.91 M/UL — SIGNIFICANT CHANGE UP (ref 4.2–5.8)
RBC # FLD: 19.5 % — HIGH (ref 10.3–14.5)
SODIUM SERPL-SCNC: 138 MMOL/L — SIGNIFICANT CHANGE UP (ref 135–145)
WBC # BLD: 6.41 K/UL — SIGNIFICANT CHANGE UP (ref 3.8–10.5)
WBC # FLD AUTO: 6.41 K/UL — SIGNIFICANT CHANGE UP (ref 3.8–10.5)

## 2022-10-03 PROCEDURE — 99232 SBSQ HOSP IP/OBS MODERATE 35: CPT

## 2022-10-03 PROCEDURE — 99233 SBSQ HOSP IP/OBS HIGH 50: CPT | Mod: GC

## 2022-10-03 PROCEDURE — 36556 INSERT NON-TUNNEL CV CATH: CPT

## 2022-10-03 PROCEDURE — 76937 US GUIDE VASCULAR ACCESS: CPT | Mod: 26

## 2022-10-03 RX ORDER — PHENOBARBITAL 60 MG
130 TABLET ORAL
Refills: 0 | Status: DISCONTINUED | OUTPATIENT
Start: 2022-10-03 | End: 2022-10-04

## 2022-10-03 RX ORDER — SODIUM CHLORIDE 9 MG/ML
1000 INJECTION, SOLUTION INTRAVENOUS ONCE
Refills: 0 | Status: COMPLETED | OUTPATIENT
Start: 2022-10-03 | End: 2022-10-03

## 2022-10-03 RX ORDER — DEXMEDETOMIDINE HYDROCHLORIDE IN 0.9% SODIUM CHLORIDE 4 UG/ML
0.5 INJECTION INTRAVENOUS
Qty: 200 | Refills: 0 | Status: DISCONTINUED | OUTPATIENT
Start: 2022-10-03 | End: 2022-10-09

## 2022-10-03 RX ORDER — ACETAMINOPHEN 500 MG
650 TABLET ORAL EVERY 6 HOURS
Refills: 0 | Status: DISCONTINUED | OUTPATIENT
Start: 2022-10-03 | End: 2022-10-17

## 2022-10-03 RX ORDER — POTASSIUM PHOSPHATE, MONOBASIC POTASSIUM PHOSPHATE, DIBASIC 236; 224 MG/ML; MG/ML
15 INJECTION, SOLUTION INTRAVENOUS ONCE
Refills: 0 | Status: COMPLETED | OUTPATIENT
Start: 2022-10-03 | End: 2022-10-03

## 2022-10-03 RX ORDER — HALOPERIDOL DECANOATE 100 MG/ML
5 INJECTION INTRAMUSCULAR ONCE
Refills: 0 | Status: COMPLETED | OUTPATIENT
Start: 2022-10-03 | End: 2022-10-03

## 2022-10-03 RX ORDER — HALOPERIDOL DECANOATE 100 MG/ML
5 INJECTION INTRAMUSCULAR ONCE
Refills: 0 | Status: DISCONTINUED | OUTPATIENT
Start: 2022-10-03 | End: 2022-10-03

## 2022-10-03 RX ORDER — SODIUM CHLORIDE 9 MG/ML
1000 INJECTION, SOLUTION INTRAVENOUS
Refills: 0 | Status: DISCONTINUED | OUTPATIENT
Start: 2022-10-03 | End: 2022-10-05

## 2022-10-03 RX ORDER — SODIUM CHLORIDE 9 MG/ML
10 INJECTION INTRAMUSCULAR; INTRAVENOUS; SUBCUTANEOUS
Refills: 0 | Status: DISCONTINUED | OUTPATIENT
Start: 2022-10-03 | End: 2022-10-06

## 2022-10-03 RX ADMIN — ENOXAPARIN SODIUM 40 MILLIGRAM(S): 100 INJECTION SUBCUTANEOUS at 13:23

## 2022-10-03 RX ADMIN — DEXMEDETOMIDINE HYDROCHLORIDE IN 0.9% SODIUM CHLORIDE 10.3 MICROGRAM(S)/KG/HR: 4 INJECTION INTRAVENOUS at 22:22

## 2022-10-03 RX ADMIN — HALOPERIDOL DECANOATE 5 MILLIGRAM(S): 100 INJECTION INTRAMUSCULAR at 19:30

## 2022-10-03 RX ADMIN — Medication 130 MILLIGRAM(S): at 16:28

## 2022-10-03 RX ADMIN — Medication 130 MILLIGRAM(S): at 18:57

## 2022-10-03 RX ADMIN — CHLORHEXIDINE GLUCONATE 1 APPLICATION(S): 213 SOLUTION TOPICAL at 12:20

## 2022-10-03 RX ADMIN — Medication 130 MILLIGRAM(S): at 14:09

## 2022-10-03 RX ADMIN — SODIUM CHLORIDE 100 MILLILITER(S): 9 INJECTION, SOLUTION INTRAVENOUS at 00:13

## 2022-10-03 RX ADMIN — Medication 130 MILLIGRAM(S): at 06:14

## 2022-10-03 RX ADMIN — Medication 130 MILLIGRAM(S): at 12:04

## 2022-10-03 RX ADMIN — Medication 650 MILLIGRAM(S): at 16:10

## 2022-10-03 RX ADMIN — POTASSIUM PHOSPHATE, MONOBASIC POTASSIUM PHOSPHATE, DIBASIC 62.5 MILLIMOLE(S): 236; 224 INJECTION, SOLUTION INTRAVENOUS at 06:15

## 2022-10-03 RX ADMIN — Medication 1 TABLET(S): at 12:04

## 2022-10-03 RX ADMIN — Medication 1 MILLIGRAM(S): at 12:20

## 2022-10-03 RX ADMIN — Medication 650 MILLIGRAM(S): at 15:50

## 2022-10-03 RX ADMIN — Medication 130 MILLIGRAM(S): at 03:12

## 2022-10-03 RX ADMIN — PANTOPRAZOLE SODIUM 40 MILLIGRAM(S): 20 TABLET, DELAYED RELEASE ORAL at 12:04

## 2022-10-03 RX ADMIN — DEXMEDETOMIDINE HYDROCHLORIDE IN 0.9% SODIUM CHLORIDE 14.3 MICROGRAM(S)/KG/HR: 4 INJECTION INTRAVENOUS at 04:19

## 2022-10-03 RX ADMIN — SODIUM CHLORIDE 1000 MILLILITER(S): 9 INJECTION, SOLUTION INTRAVENOUS at 03:12

## 2022-10-03 RX ADMIN — Medication 100 MILLIGRAM(S): at 12:05

## 2022-10-03 RX ADMIN — HALOPERIDOL DECANOATE 5 MILLIGRAM(S): 100 INJECTION INTRAMUSCULAR at 16:53

## 2022-10-03 RX ADMIN — Medication 130 MILLIGRAM(S): at 08:57

## 2022-10-03 NOTE — PROGRESS NOTE ADULT - SUBJECTIVE AND OBJECTIVE BOX
[facility]  INFECTIOUS DISEASES   [address] [place] [address]  Tel: [phone]     Fax: [phone]  ======================================================  Humphrey MD Jonathan Garellek, DO Calli Mercedes, RIVKA   ======================================================    VANDANA VILLA  MRN-[id number]      Follow Up:      Interval History:    ROS:    [ ] Unobtainable because:  [ ] All other systems negative except as noted    Constitutional: no fever, no chills  Head: no trauma  Eyes: no vision changes, no eye pain  ENT:  no sore throat, no rhinorrhea  Cardiovascular:  no chest pain, no palpitation  Respiratory:  no SOB, no cough  GI:  no abd pain, no vomiting, no diarrhea  urinary: no dysuria, no hematuria, no flank pain  musculoskeletal:  no joint pain, no joint swelling  skin:  no rash  neurology:  no headache, no seizure, no change in mental status  psych: no anxiety, no depression         Allergies  No Known Allergies        ANTIMICROBIALS:      OTHER MEDS:  acetaminophen     Tablet .. 650 milliGRAM(s) Oral every 6 hours PRN  chlorhexidine 2% Cloths 1 Application(s) Topical <User Schedule>  dexMEDEtomidine Infusion 0.692 MICROgram(s)/kG/Hr IV Continuous <Continuous>  enoxaparin Injectable 40 milliGRAM(s) SubCutaneous every 24 hours  folic acid Injectable 1 milliGRAM(s) IV Push daily  haloperidol    Injectable 5 milliGRAM(s) IntraMuscular once  influenza   Vaccine 0.5 milliLiter(s) IntraMuscular once  lactated ringers. 1000 milliLiter(s) IV Continuous <Continuous>  multivitamin 1 Tablet(s) Oral daily  ondansetron Injectable 4 milliGRAM(s) IV Push every 6 hours PRN  pantoprazole  Injectable 40 milliGRAM(s) IV Push daily  PHENobarbital Injectable 130 milliGRAM(s) IV Push every 6 hours  PHENobarbital Injectable 130 milliGRAM(s) IV Push every 15 minutes PRN  sodium chloride 0.9% lock flush 10 milliLiter(s) IV Push every 1 hour PRN  thiamine Injectable 100 milliGRAM(s) IV Push daily      Physical Exam:  Vital Signs Last 24 Hrs  T(C): 36.6 (03 Oct 2022 11:10), Max: 37.7 (02 Oct 2022 19:07)  T(F): 97.9 (03 Oct 2022 11:10), Max: 99.9 (02 Oct 2022 19:07)  HR: 68 (03 Oct 2022 16:00) (49 - 101)  BP: 145/92 (03 Oct 2022 16:00) (80/58 - 190/101)  BP(mean): 104 (03 Oct 2022 16:00) (64 - 121)  RR: 17 (03 Oct 2022 16:00) (11 - 26)  SpO2: 100% (03 Oct 2022 16:00) (94% - 100%)    Parameters below as of 03 Oct 2022 11:00      O2 Concentration (%): 26    General:    NAD,  non toxic  Head: atraumatic, normocephalic  Eye: normal sclera and conjunctiva  ENT:    no oral lesions, neck supple, facial fullness resolved   Cardio:     regular S1, S2,  no murmur  Respiratory:    clear b/l,    no wheezing  abd:     soft,   BS +,   no tenderness  :   no CVAT,  no suprapubic tenderness,   no  saxena  Musculoskeletal:   no joint swelling,   bilateral arm edema  vascular: left groin central line, +PIV   Skin:    no rash  Neurologic:     no focal deficit  psych: normal affect    WBC Count: 6.41 K/uL (10-03 @ 02:19)  WBC Count: 3.06 K/uL (10-02 @ 05:37)  WBC Count: 2.81 K/uL (10-01 @ 06:06)  WBC Count: 3.63 K/uL (09-30 @ 14:25)  WBC Count: 3.26 K/uL (09-29 @ 20:07)                            11.5   6.41  )-----------( 145      ( 03 Oct 2022 02:19 )             36.8       10-03    138  |  105  |  9   ----------------------------<  97  3.5   |  25  |  1.12    Ca    8.8      03 Oct 2022 02:19  Phos  2.4     10-03  Mg     2.1     10-03    TPro  7.0  /  Alb  2.8<L>  /  TBili  0.7  /  DBili  x   /  AST  21  /  ALT  25  /  AlkPhos  56  10-03          Creatinine Trend: 1.12<--, 0.79<--, 0.92<--, 1.07<--, 0.94<--, 0.96<--      MICROBIOLOGY:  v  .Blood Blood-Peripheral  09-30-22   No growth to date.  --  --      .Blood Blood-Peripheral  09-30-22   No growth to date.  --  --      SARS-CoV-2 Result: NotDetec (09-29-22 @ 20:07)        RADIOLOGY:  < from: CT Maxillofacial w/ IV Cont (09.30.22 @ 15:52) >  IMPRESSION:    Bilateral submandibular and parotid glands demonstrate no focal lesion.    No abnormal enhancement within the soft tissues of the neck. No   lymphadenopathy or soft tissue abscess.    No infiltration of the subcutaneous or parapharyngeal fat planes.    No paranasal sinus air-fluid levels. Bilateral maxillary sinus mucosal   thickening.    < end of copied text >

## 2022-10-03 NOTE — PROCEDURE NOTE - NSPROCDETAILS_GEN_ALL_CORE
positive blood return obtained via catheter/hemostasis with direct pressure, dressing applied
guidewire recovered/lumen(s) aspirated and flushed/sterile dressing applied/sterile technique, catheter placed/ultrasound guidance with use of sterile gel and probe cove

## 2022-10-03 NOTE — PROGRESS NOTE ADULT - ASSESSMENT
Patient is a 57M with a PMH of chronic ETOH abuse with hx of alcohol withdrawal and DTs with frequent hospital admissions, alcoholic gastritis/esophagitis, PUD, HLD, hx of hypoxic respiratory failure requiring intubation due to aspiration PNA who presents to the ED for abdominal pain and vomiting.  Patient reports significant abdominal pain after drinking alcohol.  Patient unspecific about the amount of drinks per day or when his last drink was.  Noted to be withdrawn in ED.  Vitals stable, labs show lactic acidosis  high lactate on admission which improved with fluids   slightly leukopenic   facial fullness and redness-? obtain CT maxofacial, blood cultures and start antibiotics        10/3: negative cultures, no cellulitis, CT without abnormal findings, off antibiotics, in ICU for withdrawal, low suspicion for infectious process     Plan:  continue to monitor off antibiotics   follow all blood cultures   monitor for signs of withdrawal and DTs   trend cbc and CMP       Discussed with Dr. Reeves NP    Will sign off. Please call with questions.     Jimi Tai, DO  Infectious Disease Attending  Reachable via Microsoft Teams or ID office: [phone]  After 5pm/weekends please call [phone] for all inquiries and new consults

## 2022-10-03 NOTE — PROGRESS NOTE ADULT - ASSESSMENT
56M w/ chronic EtOH abuse and multiple admissions for DTs, alcoholic gastritis, multiple episodes of aspiration pna requiring intubations. Presents w/ abdominal pain and vomiting. Admitted to medical floors for withdrawal symptoms and was started on antibiotics for possible partotitis. Transferred to ICU for worsening agitation requiring precedex and phenobarb. Now improving again.     Neuro- continue w/ phenobarb 130 q6 w/ PRN doses;  Off prcedex, not agitated, just hungry today.   Pulm: No issues no longer requiring increased monitoring du to improved MS.  noted to have parotid and submandibular swelling and redness on exam, was started on abx empirically for possible partitits - CT revealed no acute findings - off abx now  CV: Hypertensive with no symptoms, no urgency to treat, would offer out pt follow up.  Renal:  electrolytes typical lows for chronic ETOH abuse.  monitor electrolytes closely  GI: awake can now eat  Heme: slightly downtrending counts will monitor  DVT PPX with lovenox  GOC: still full code, still not intrested in quitting.

## 2022-10-03 NOTE — PROGRESS NOTE ADULT - SUBJECTIVE AND OBJECTIVE BOX
CHIEF COMPLAINT:Patient is a 57y old  Male who presents with a chief complaint of alcohol gastritis, withdrawal (03 Oct 2022 16:46)        Interval Events:    REVIEW OF SYSTEMS:  Hungry, no pain, no SOB.   [ x] All other systems negative  [ ] Unable to assess ROS because ________    OBJECTIVE:  ICU Vital Signs Last 24 Hrs  T(C): 36.6 (03 Oct 2022 11:10), Max: 37.7 (02 Oct 2022 19:07)  T(F): 97.9 (03 Oct 2022 11:10), Max: 99.9 (02 Oct 2022 19:07)  HR: 90 (03 Oct 2022 17:00) (49 - 101)  BP: 145/92 (03 Oct 2022 16:00) (102/83 - 190/101)  BP(mean): 104 (03 Oct 2022 16:00) (71 - 121)  ABP: --  ABP(mean): --  RR: 15 (03 Oct 2022 17:00) (11 - 26)  SpO2: 100% (03 Oct 2022 17:00) (94% - 100%)    O2 Parameters below as of 03 Oct 2022 11:00      O2 Concentration (%): 26          10-02 @ 07:01  -  10-03 @ 07:00  --------------------------------------------------------  IN: 2529.8 mL / OUT: 1380 mL / NET: 1149.8 mL    10-03 @ 07:01  -  10-03 @ 17:41  --------------------------------------------------------  IN: 900 mL / OUT: 500 mL / NET: 400 mL      CAPILLARY BLOOD GLUCOSE          PHYSICAL EXAM:  General: NAD, non toxic appearing  HEENT: dry MM  Neck: supple, submandibular fullness  Respiratory: poor inspiratory effort  Cardiovascular: s1s2 RRR  Abdomen: soft, non tender, non distended  Extremities: warm, no edema  Skin: intact  Neurological: moves all extremities  Psychiatry: sedated      LINES:    HOSPITAL MEDICATIONS:  Standing Meds:  chlorhexidine 2% Cloths 1 Application(s) Topical <User Schedule>  dexMEDEtomidine Infusion 0.692 MICROgram(s)/kG/Hr IV Continuous <Continuous>  enoxaparin Injectable 40 milliGRAM(s) SubCutaneous every 24 hours  folic acid Injectable 1 milliGRAM(s) IV Push daily  influenza   Vaccine 0.5 milliLiter(s) IntraMuscular once  lactated ringers. 1000 milliLiter(s) IV Continuous <Continuous>  multivitamin 1 Tablet(s) Oral daily  pantoprazole  Injectable 40 milliGRAM(s) IV Push daily  PHENobarbital Injectable 130 milliGRAM(s) IV Push every 6 hours  thiamine Injectable 100 milliGRAM(s) IV Push daily      PRN Meds:  acetaminophen     Tablet .. 650 milliGRAM(s) Oral every 6 hours PRN  ondansetron Injectable 4 milliGRAM(s) IV Push every 6 hours PRN  PHENobarbital Injectable 130 milliGRAM(s) IV Push every 15 minutes PRN  sodium chloride 0.9% lock flush 10 milliLiter(s) IV Push every 1 hour PRN      LABS:                        11.5   6.41  )-----------( 145      ( 03 Oct 2022 02:19 )             36.8     Hgb Trend: 11.5<--, 10.7<--, 9.9<--, 11.0<--, 14.8<--  10-03    138  |  105  |  9   ----------------------------<  97  3.5   |  25  |  1.12    Ca    8.8      03 Oct 2022 02:19  Phos  2.4     10-03  Mg     2.1     10-03    TPro  7.0  /  Alb  2.8<L>  /  TBili  0.7  /  DBili  x   /  AST  21  /  ALT  25  /  AlkPhos  56  10-03    Creatinine Trend: 1.12<--, 0.79<--, 0.92<--, 1.07<--, 0.94<--, 0.96<--            MICROBIOLOGY:     RADIOLOGY:  [ ] Reviewed and interpreted by me    EKG:

## 2022-10-04 LAB
ALBUMIN SERPL ELPH-MCNC: 2.7 G/DL — LOW (ref 3.3–5)
ALP SERPL-CCNC: 49 U/L — SIGNIFICANT CHANGE UP (ref 40–120)
ALT FLD-CCNC: 23 U/L — SIGNIFICANT CHANGE UP (ref 12–78)
ANION GAP SERPL CALC-SCNC: 9 MMOL/L — SIGNIFICANT CHANGE UP (ref 5–17)
AST SERPL-CCNC: 24 U/L — SIGNIFICANT CHANGE UP (ref 15–37)
BILIRUB SERPL-MCNC: 0.5 MG/DL — SIGNIFICANT CHANGE UP (ref 0.2–1.2)
BUN SERPL-MCNC: 6 MG/DL — LOW (ref 7–23)
CALCIUM SERPL-MCNC: 8.5 MG/DL — SIGNIFICANT CHANGE UP (ref 8.5–10.1)
CHLORIDE SERPL-SCNC: 106 MMOL/L — SIGNIFICANT CHANGE UP (ref 96–108)
CO2 SERPL-SCNC: 22 MMOL/L — SIGNIFICANT CHANGE UP (ref 22–31)
CREAT SERPL-MCNC: 0.83 MG/DL — SIGNIFICANT CHANGE UP (ref 0.5–1.3)
EGFR: 103 ML/MIN/1.73M2 — SIGNIFICANT CHANGE UP
GLUCOSE SERPL-MCNC: 89 MG/DL — SIGNIFICANT CHANGE UP (ref 70–99)
HCT VFR BLD CALC: 33.1 % — LOW (ref 39–50)
HGB BLD-MCNC: 10.2 G/DL — LOW (ref 13–17)
MAGNESIUM SERPL-MCNC: 1.8 MG/DL — SIGNIFICANT CHANGE UP (ref 1.6–2.6)
MCHC RBC-ENTMCNC: 23.4 PG — LOW (ref 27–34)
MCHC RBC-ENTMCNC: 30.8 G/DL — LOW (ref 32–36)
MCV RBC AUTO: 76.1 FL — LOW (ref 80–100)
NRBC # BLD: 0 /100 WBCS — SIGNIFICANT CHANGE UP (ref 0–0)
PHOSPHATE SERPL-MCNC: 2.9 MG/DL — SIGNIFICANT CHANGE UP (ref 2.5–4.5)
PLATELET # BLD AUTO: 142 K/UL — LOW (ref 150–400)
POTASSIUM SERPL-MCNC: 3.2 MMOL/L — LOW (ref 3.5–5.3)
POTASSIUM SERPL-SCNC: 3.2 MMOL/L — LOW (ref 3.5–5.3)
PROT SERPL-MCNC: 6.9 GM/DL — SIGNIFICANT CHANGE UP (ref 6–8.3)
RBC # BLD: 4.35 M/UL — SIGNIFICANT CHANGE UP (ref 4.2–5.8)
RBC # FLD: 19.4 % — HIGH (ref 10.3–14.5)
SODIUM SERPL-SCNC: 137 MMOL/L — SIGNIFICANT CHANGE UP (ref 135–145)
WBC # BLD: 3.62 K/UL — LOW (ref 3.8–10.5)
WBC # FLD AUTO: 3.62 K/UL — LOW (ref 3.8–10.5)

## 2022-10-04 PROCEDURE — 99233 SBSQ HOSP IP/OBS HIGH 50: CPT

## 2022-10-04 RX ORDER — POTASSIUM PHOSPHATE, MONOBASIC POTASSIUM PHOSPHATE, DIBASIC 236; 224 MG/ML; MG/ML
15 INJECTION, SOLUTION INTRAVENOUS ONCE
Refills: 0 | Status: COMPLETED | OUTPATIENT
Start: 2022-10-04 | End: 2022-10-04

## 2022-10-04 RX ORDER — MAGNESIUM SULFATE 500 MG/ML
1 VIAL (ML) INJECTION ONCE
Refills: 0 | Status: COMPLETED | OUTPATIENT
Start: 2022-10-04 | End: 2022-10-04

## 2022-10-04 RX ORDER — POTASSIUM CHLORIDE 20 MEQ
10 PACKET (EA) ORAL
Refills: 0 | Status: COMPLETED | OUTPATIENT
Start: 2022-10-04 | End: 2022-10-04

## 2022-10-04 RX ADMIN — DEXMEDETOMIDINE HYDROCHLORIDE IN 0.9% SODIUM CHLORIDE 10.3 MICROGRAM(S)/KG/HR: 4 INJECTION INTRAVENOUS at 23:14

## 2022-10-04 RX ADMIN — Medication 130 MILLIGRAM(S): at 00:23

## 2022-10-04 RX ADMIN — PANTOPRAZOLE SODIUM 40 MILLIGRAM(S): 20 TABLET, DELAYED RELEASE ORAL at 11:46

## 2022-10-04 RX ADMIN — Medication 130 MILLIGRAM(S): at 23:24

## 2022-10-04 RX ADMIN — POTASSIUM PHOSPHATE, MONOBASIC POTASSIUM PHOSPHATE, DIBASIC 62.5 MILLIMOLE(S): 236; 224 INJECTION, SOLUTION INTRAVENOUS at 06:35

## 2022-10-04 RX ADMIN — DEXMEDETOMIDINE HYDROCHLORIDE IN 0.9% SODIUM CHLORIDE 10.3 MICROGRAM(S)/KG/HR: 4 INJECTION INTRAVENOUS at 20:30

## 2022-10-04 RX ADMIN — DEXMEDETOMIDINE HYDROCHLORIDE IN 0.9% SODIUM CHLORIDE 10.3 MICROGRAM(S)/KG/HR: 4 INJECTION INTRAVENOUS at 14:48

## 2022-10-04 RX ADMIN — Medication 100 MILLIEQUIVALENT(S): at 06:36

## 2022-10-04 RX ADMIN — CHLORHEXIDINE GLUCONATE 1 APPLICATION(S): 213 SOLUTION TOPICAL at 06:36

## 2022-10-04 RX ADMIN — Medication 130 MILLIGRAM(S): at 06:41

## 2022-10-04 RX ADMIN — Medication 100 GRAM(S): at 06:40

## 2022-10-04 RX ADMIN — Medication 100 MILLIEQUIVALENT(S): at 07:19

## 2022-10-04 RX ADMIN — DEXMEDETOMIDINE HYDROCHLORIDE IN 0.9% SODIUM CHLORIDE 10.3 MICROGRAM(S)/KG/HR: 4 INJECTION INTRAVENOUS at 09:58

## 2022-10-04 RX ADMIN — Medication 100 MILLIGRAM(S): at 11:46

## 2022-10-04 RX ADMIN — Medication 130 MILLIGRAM(S): at 09:19

## 2022-10-04 RX ADMIN — DEXMEDETOMIDINE HYDROCHLORIDE IN 0.9% SODIUM CHLORIDE 10.3 MICROGRAM(S)/KG/HR: 4 INJECTION INTRAVENOUS at 01:55

## 2022-10-04 RX ADMIN — ENOXAPARIN SODIUM 40 MILLIGRAM(S): 100 INJECTION SUBCUTANEOUS at 13:17

## 2022-10-04 RX ADMIN — Medication 1 TABLET(S): at 11:47

## 2022-10-04 RX ADMIN — Medication 100 GRAM(S): at 08:20

## 2022-10-04 RX ADMIN — Medication 130 MILLIGRAM(S): at 18:35

## 2022-10-04 RX ADMIN — Medication 1 MILLIGRAM(S): at 12:26

## 2022-10-04 RX ADMIN — Medication 100 MILLIEQUIVALENT(S): at 07:58

## 2022-10-04 RX ADMIN — Medication 130 MILLIGRAM(S): at 15:32

## 2022-10-04 NOTE — PROGRESS NOTE ADULT - SUBJECTIVE AND OBJECTIVE BOX
HPI:  Pt is a 55 yo M with h/o chronic EtOH abuse and multiple admissions for DTs, alcoholic gastritis, multiple episodes of aspiration PNA requiring intubations. Presents 2 to abdominal pain and vomiting; admitted to [facility] for withdrawal symptoms and also started on Abx for possible partotitis. Pt transferred to ICU for worsening agitation requiring Precedex and Phenobarb.      ## Labs:  CBC:                        10.2   3.62  )-----------( 142      ( 04 Oct 2022 03:17 )             33.1     Chem:  10-04    137  |  106  |  6<L>  ----------------------------<  89  3.2<L>   |  22  |  0.83    Ca    8.5      04 Oct 2022 03:17  Phos  2.9     10-04  Mg     1.8     10-04    TPro  6.9  /  Alb  2.7<L>  /  TBili  0.5  /  DBili  x   /  AST  24  /  ALT  23  /  AlkPhos  49  10-04    Coags:          ## Imaging:    ## Medications:          enoxaparin Injectable 40 milliGRAM(s) SubCutaneous every 24 hours    pantoprazole  Injectable 40 milliGRAM(s) IV Push daily    acetaminophen     Tablet .. 650 milliGRAM(s) Oral every 6 hours PRN  dexMEDEtomidine Infusion 0.5 MICROgram(s)/kG/Hr IV Continuous <Continuous>  ondansetron Injectable 4 milliGRAM(s) IV Push every 6 hours PRN  PHENobarbital Injectable 130 milliGRAM(s) IV Push every 6 hours      ## Vitals:  T(C): 36.7 (10-04-22 @ 11:00), Max: 36.7 (10-04-22 @ 11:00)  HR: 58 (10-04-22 @ 15:00) (53 - 102)  BP: 100/71 (10-04-22 @ 15:00) (100/71 - 149/87)  BP(mean): 78 (10-04-22 @ 15:00) (67 - 115)  RR: 11 (10-04-22 @ 15:00) (11 - 21)  SpO2: 100% (10-04-22 @ 15:00) (94% - 100%)  Wt(kg): --  Vent:   ABG:       10-03 @ 07:01  -  10-04 @ 07:00  --------------------------------------------------------  IN: 2893.2 mL / OUT: 3080 mL / NET: -186.8 mL    10-04 @ 07:01  -  10-04 @ 16:17  --------------------------------------------------------  IN: 244.9 mL / OUT: 825 mL / NET: -580.1 mL          ## P/E:  Gen: lying comfortably in bed in no apparent distress  Nose: nc CO2 monitor  Lungs: CTA  Heart: RRR  Abd: Soft/+BS/ Non-tender  Ext: L UE edema  Neuro: Confused    CENTRAL LINE: [ ] YES [ ] NO  LOCATION: L femoral  DATE INSERTED:  REMOVE: [ ] YES [ ] NO      CHAN: [ ] YES [ ] NO    DATE INSERTED:  REMOVE:  [ ] YES [ ] NO      A-LINE:  [ ] YES [ ] NO  LOCATION:   DATE INSERTED:  REMOVE:  [ ] YES [ ] NO  EXPLAIN:    CODE STATUS: [x ] full code  [ ] DNR  [ ] DNI  [ ] SYEDA  Goals of care discussion: [ ] yes

## 2022-10-04 NOTE — PROGRESS NOTE ADULT - ASSESSMENT
Pt is a 55 yo M with h/o chronic EtOH abuse and multiple admissions for DTs, alcoholic gastritis, multiple episodes of aspiration PNA requiring intubations. Presents 2 to abdominal pain and vomiting; admitted to [facility] for withdrawal symptoms and also started on Abx for possible partotitis. Pt transferred to ICU for worsening agitation requiring Precedex and Phenobarb.    Resp: Elevate HOB/ Cont CO2 monitoring  ID: Off Abx  HEME: DVT prophylaxis with Lovenox  FEN: Po diet as MS permits/ Cont IVF/ Daily Thiamine, MVI and Folate/ Replace K; pt hypokalemic  Endo: Follow Glu  Neuro/Psych: Cont standing Phenobarb + prn and wean off Precedex as tolerated

## 2022-10-04 NOTE — DIETITIAN INITIAL EVALUATION ADULT - PERTINENT LABORATORY DATA
10-04    137  |  106  |  6<L>  ----------------------------<  89  3.2<L>   |  22  |  0.83    Ca    8.5      04 Oct 2022 03:17  Phos  2.9     10-04  Mg     1.8     10-04    TPro  6.9  /  Alb  2.7<L>  /  TBili  0.5  /  DBili  x   /  AST  24  /  ALT  23  /  AlkPhos  49  10-04

## 2022-10-04 NOTE — DIETITIAN INITIAL EVALUATION ADULT - OTHER CALCULATIONS
Height (cm): 170.2 (09-29)  Weight (kg): 82.6 (10-01)  BMI (kg/m2): 28.5 (10-01)  IBW: 67.1 kg        % IBW: 123%            UBW:  71.6 kg(06/04/21)           %UBW: 115%

## 2022-10-04 NOTE — DIETITIAN INITIAL EVALUATION ADULT - PERTINENT MEDS FT
MEDICATIONS  (STANDING):  chlorhexidine 2% Cloths 1 Application(s) Topical <User Schedule>  dexMEDEtomidine Infusion 0.5 MICROgram(s)/kG/Hr (10.3 mL/Hr) IV Continuous <Continuous>  enoxaparin Injectable 40 milliGRAM(s) SubCutaneous every 24 hours  folic acid Injectable 1 milliGRAM(s) IV Push daily  influenza   Vaccine 0.5 milliLiter(s) IntraMuscular once  lactated ringers. 1000 milliLiter(s) (100 mL/Hr) IV Continuous <Continuous>  multivitamin 1 Tablet(s) Oral daily  pantoprazole  Injectable 40 milliGRAM(s) IV Push daily  PHENobarbital Injectable 130 milliGRAM(s) IV Push every 6 hours    MEDICATIONS  (PRN):  acetaminophen     Tablet .. 650 milliGRAM(s) Oral every 6 hours PRN Temp greater or equal to 38C (100.4F), Mild Pain (1 - 3)  ondansetron Injectable 4 milliGRAM(s) IV Push every 6 hours PRN Nausea and/or Vomiting  PHENobarbital Injectable 130 milliGRAM(s) IV Push every 15 minutes PRN [place] > 20  sodium chloride 0.9% lock flush 10 milliLiter(s) IV Push every 1 hour PRN Pre/post blood products, medications, blood draw, and to maintain line patency

## 2022-10-04 NOTE — DIETITIAN INITIAL EVALUATION ADULT - NS FNS WEIGHT CHANGE REASON
As per previous adm RD note, 06/04/21, wt. of 71.6 kg/157.8 LBS and current adm wt. of 82.6 kg/182.1 LBS, wt. gain of 11 kg/~24 LBS noted

## 2022-10-05 LAB
ALBUMIN SERPL ELPH-MCNC: 2.9 G/DL — LOW (ref 3.3–5)
ALP SERPL-CCNC: 51 U/L — SIGNIFICANT CHANGE UP (ref 40–120)
ALT FLD-CCNC: 23 U/L — SIGNIFICANT CHANGE UP (ref 12–78)
ANION GAP SERPL CALC-SCNC: 9 MMOL/L — SIGNIFICANT CHANGE UP (ref 5–17)
AST SERPL-CCNC: 19 U/L — SIGNIFICANT CHANGE UP (ref 15–37)
BILIRUB SERPL-MCNC: 0.3 MG/DL — SIGNIFICANT CHANGE UP (ref 0.2–1.2)
BUN SERPL-MCNC: 7 MG/DL — SIGNIFICANT CHANGE UP (ref 7–23)
CALCIUM SERPL-MCNC: 9 MG/DL — SIGNIFICANT CHANGE UP (ref 8.5–10.1)
CHLORIDE SERPL-SCNC: 107 MMOL/L — SIGNIFICANT CHANGE UP (ref 96–108)
CO2 SERPL-SCNC: 23 MMOL/L — SIGNIFICANT CHANGE UP (ref 22–31)
CREAT SERPL-MCNC: 0.91 MG/DL — SIGNIFICANT CHANGE UP (ref 0.5–1.3)
CULTURE RESULTS: SIGNIFICANT CHANGE UP
CULTURE RESULTS: SIGNIFICANT CHANGE UP
EGFR: 100 ML/MIN/1.73M2 — SIGNIFICANT CHANGE UP
GLUCOSE SERPL-MCNC: 90 MG/DL — SIGNIFICANT CHANGE UP (ref 70–99)
HCT VFR BLD CALC: 34.8 % — LOW (ref 39–50)
HGB BLD-MCNC: 11.1 G/DL — LOW (ref 13–17)
MAGNESIUM SERPL-MCNC: 2.2 MG/DL — SIGNIFICANT CHANGE UP (ref 1.6–2.6)
MCHC RBC-ENTMCNC: 24 PG — LOW (ref 27–34)
MCHC RBC-ENTMCNC: 31.9 G/DL — LOW (ref 32–36)
MCV RBC AUTO: 75.2 FL — LOW (ref 80–100)
NRBC # BLD: 0 /100 WBCS — SIGNIFICANT CHANGE UP (ref 0–0)
PHOSPHATE SERPL-MCNC: 3.2 MG/DL — SIGNIFICANT CHANGE UP (ref 2.5–4.5)
PLATELET # BLD AUTO: 189 K/UL — SIGNIFICANT CHANGE UP (ref 150–400)
POTASSIUM SERPL-MCNC: 3.7 MMOL/L — SIGNIFICANT CHANGE UP (ref 3.5–5.3)
POTASSIUM SERPL-SCNC: 3.7 MMOL/L — SIGNIFICANT CHANGE UP (ref 3.5–5.3)
PROT SERPL-MCNC: 7.5 GM/DL — SIGNIFICANT CHANGE UP (ref 6–8.3)
RBC # BLD: 4.63 M/UL — SIGNIFICANT CHANGE UP (ref 4.2–5.8)
RBC # FLD: 20.1 % — HIGH (ref 10.3–14.5)
SODIUM SERPL-SCNC: 139 MMOL/L — SIGNIFICANT CHANGE UP (ref 135–145)
SPECIMEN SOURCE: SIGNIFICANT CHANGE UP
SPECIMEN SOURCE: SIGNIFICANT CHANGE UP
WBC # BLD: 3.41 K/UL — LOW (ref 3.8–10.5)
WBC # FLD AUTO: 3.41 K/UL — LOW (ref 3.8–10.5)

## 2022-10-05 PROCEDURE — 99233 SBSQ HOSP IP/OBS HIGH 50: CPT

## 2022-10-05 RX ORDER — PHENOBARBITAL 60 MG
260 TABLET ORAL ONCE
Refills: 0 | Status: DISCONTINUED | OUTPATIENT
Start: 2022-10-05 | End: 2022-10-05

## 2022-10-05 RX ORDER — PHENOBARBITAL 60 MG
130 TABLET ORAL ONCE
Refills: 0 | Status: DISCONTINUED | OUTPATIENT
Start: 2022-10-05 | End: 2022-10-05

## 2022-10-05 RX ORDER — PHENOBARBITAL 60 MG
260 TABLET ORAL EVERY 6 HOURS
Refills: 0 | Status: DISCONTINUED | OUTPATIENT
Start: 2022-10-05 | End: 2022-10-07

## 2022-10-05 RX ORDER — POTASSIUM CHLORIDE 20 MEQ
10 PACKET (EA) ORAL
Refills: 0 | Status: COMPLETED | OUTPATIENT
Start: 2022-10-05 | End: 2022-10-05

## 2022-10-05 RX ADMIN — CHLORHEXIDINE GLUCONATE 1 APPLICATION(S): 213 SOLUTION TOPICAL at 04:42

## 2022-10-05 RX ADMIN — DEXMEDETOMIDINE HYDROCHLORIDE IN 0.9% SODIUM CHLORIDE 10.3 MICROGRAM(S)/KG/HR: 4 INJECTION INTRAVENOUS at 14:05

## 2022-10-05 RX ADMIN — Medication 260 MILLIGRAM(S): at 16:08

## 2022-10-05 RX ADMIN — DEXMEDETOMIDINE HYDROCHLORIDE IN 0.9% SODIUM CHLORIDE 10.3 MICROGRAM(S)/KG/HR: 4 INJECTION INTRAVENOUS at 21:27

## 2022-10-05 RX ADMIN — Medication 100 MILLIEQUIVALENT(S): at 07:10

## 2022-10-05 RX ADMIN — Medication 100 MILLIEQUIVALENT(S): at 07:36

## 2022-10-05 RX ADMIN — DEXMEDETOMIDINE HYDROCHLORIDE IN 0.9% SODIUM CHLORIDE 10.3 MICROGRAM(S)/KG/HR: 4 INJECTION INTRAVENOUS at 17:49

## 2022-10-05 RX ADMIN — Medication 100 MILLIEQUIVALENT(S): at 06:27

## 2022-10-05 RX ADMIN — DEXMEDETOMIDINE HYDROCHLORIDE IN 0.9% SODIUM CHLORIDE 10.3 MICROGRAM(S)/KG/HR: 4 INJECTION INTRAVENOUS at 10:44

## 2022-10-05 RX ADMIN — PANTOPRAZOLE SODIUM 40 MILLIGRAM(S): 20 TABLET, DELAYED RELEASE ORAL at 12:55

## 2022-10-05 RX ADMIN — DEXMEDETOMIDINE HYDROCHLORIDE IN 0.9% SODIUM CHLORIDE 10.3 MICROGRAM(S)/KG/HR: 4 INJECTION INTRAVENOUS at 01:42

## 2022-10-05 RX ADMIN — DEXMEDETOMIDINE HYDROCHLORIDE IN 0.9% SODIUM CHLORIDE 10.3 MICROGRAM(S)/KG/HR: 4 INJECTION INTRAVENOUS at 22:56

## 2022-10-05 RX ADMIN — DEXMEDETOMIDINE HYDROCHLORIDE IN 0.9% SODIUM CHLORIDE 10.3 MICROGRAM(S)/KG/HR: 4 INJECTION INTRAVENOUS at 07:10

## 2022-10-05 RX ADMIN — DEXMEDETOMIDINE HYDROCHLORIDE IN 0.9% SODIUM CHLORIDE 10.3 MICROGRAM(S)/KG/HR: 4 INJECTION INTRAVENOUS at 07:37

## 2022-10-05 RX ADMIN — DEXMEDETOMIDINE HYDROCHLORIDE IN 0.9% SODIUM CHLORIDE 10.3 MICROGRAM(S)/KG/HR: 4 INJECTION INTRAVENOUS at 04:00

## 2022-10-05 RX ADMIN — Medication 260 MILLIGRAM(S): at 17:48

## 2022-10-05 RX ADMIN — Medication 130 MILLIGRAM(S): at 12:54

## 2022-10-05 RX ADMIN — ENOXAPARIN SODIUM 40 MILLIGRAM(S): 100 INJECTION SUBCUTANEOUS at 12:55

## 2022-10-05 RX ADMIN — Medication 130 MILLIGRAM(S): at 09:27

## 2022-10-05 RX ADMIN — Medication 1 MILLIGRAM(S): at 12:56

## 2022-10-05 NOTE — PROGRESS NOTE ADULT - ASSESSMENT
Pt is a 55 yo M with h/o chronic EtOH abuse and multiple admissions for DTs, alcoholic gastritis, multiple episodes of aspiration PNA requiring intubations. Presents 2 to abdominal pain and vomiting; admitted to [facility] for withdrawal symptoms and also started on Abx for possible partotitis. Pt transferred to ICU for worsening agitation requiring Precedex and Phenobarb.    Resp: Elevate HOB/ Cont CO2 monitoring  ID: Off Abx  HEME: DVT prophylaxis with Lovenox  FEN: Po diet as MS permits/ Cont IVF/ Daily Thiamine, MVI and Folate  Endo: Follow Glu  Neuro/Psych: Cont standing Phenobarb + prn and wean off Precedex as tolerated

## 2022-10-05 NOTE — PROGRESS NOTE ADULT - SUBJECTIVE AND OBJECTIVE BOX
HPI:  Pt is a 57 yo M with h/o chronic EtOH abuse and multiple admissions for DTs, alcoholic gastritis, multiple episodes of aspiration PNA requiring intubations. Presents 2 to abdominal pain and vomiting; admitted to [facility] for withdrawal symptoms and also started on Abx for possible partotitis. Pt transferred to ICU for worsening agitation requiring Precedex and Phenobarb.    ## Labs:  CBC:                        11.1   3.41  )-----------( 189      ( 05 Oct 2022 04:30 )             34.8     Chem:  10-05    139  |  107  |  7   ----------------------------<  90  3.7   |  23  |  0.91    Ca    9.0      05 Oct 2022 04:30  Phos  3.2     10-05  Mg     2.2     10-05    TPro  7.5  /  Alb  2.9<L>  /  TBili  0.3  /  DBili  x   /  AST  19  /  ALT  23  /  AlkPhos  51  10-05    Coags:          ## Imaging:    ## Medications:          enoxaparin Injectable 40 milliGRAM(s) SubCutaneous every 24 hours    pantoprazole  Injectable 40 milliGRAM(s) IV Push daily    acetaminophen     Tablet .. 650 milliGRAM(s) Oral every 6 hours PRN  dexMEDEtomidine Infusion 0.5 MICROgram(s)/kG/Hr IV Continuous <Continuous>  ondansetron Injectable 4 milliGRAM(s) IV Push every 6 hours PRN  PHENobarbital Injectable 130 milliGRAM(s) IV Push every 6 hours      ## Vitals:  T(C): 35.7 (10-05-22 @ 12:00), Max: 36.1 (10-04-22 @ 20:46)  HR: 64 (10-05-22 @ 10:00) (48 - 93)  BP: 123/78 (10-05-22 @ 10:00) (100/71 - 161/90)  BP(mean): 90 (10-05-22 @ 10:00) (66 - 109)  RR: 12 (10-05-22 @ 10:00) (9 - 21)  SpO2: 100% (10-05-22 @ 10:00) (97% - 100%)  Wt(kg): --  Vent:   ABG:       10-04 @ 07:01  -  10-05 @ 07:00  --------------------------------------------------------  IN: 572.6 mL / OUT: 1995 mL / NET: -1422.4 mL          ## P/E:  Gen: lying comfortably in bed in no apparent distress  Nose: nc CO2 monitor  Lungs: CTA  Heart: RRR  Abd: Soft/+BS/ Non-tender  Ext: No edema  Neuro: Calm, lightly sedated    CENTRAL LINE: [ ] YES [ ] NO  LOCATION: L femoral  DATE INSERTED:  REMOVE: [ ] YES [ ] NO      CHAN: [ ] YES [ ] NO    DATE INSERTED:  REMOVE:  [ ] YES [ ] NO      A-LINE:  [ ] YES [ ] NO  LOCATION:   DATE INSERTED:  REMOVE:  [ ] YES [ ] NO  EXPLAIN:    CODE STATUS: [x ] full code  [ ] DNR  [ ] DNI  [ ] ZINAST  Goals of care discussion: [ ] yes Goals of care discussion: [ ] yes

## 2022-10-06 LAB
ALBUMIN SERPL ELPH-MCNC: 2.7 G/DL — LOW (ref 3.3–5)
ALP SERPL-CCNC: 48 U/L — SIGNIFICANT CHANGE UP (ref 40–120)
ALT FLD-CCNC: 21 U/L — SIGNIFICANT CHANGE UP (ref 12–78)
ANION GAP SERPL CALC-SCNC: 10 MMOL/L — SIGNIFICANT CHANGE UP (ref 5–17)
AST SERPL-CCNC: 19 U/L — SIGNIFICANT CHANGE UP (ref 15–37)
BASOPHILS # BLD AUTO: 0.02 K/UL — SIGNIFICANT CHANGE UP (ref 0–0.2)
BASOPHILS NFR BLD AUTO: 0.4 % — SIGNIFICANT CHANGE UP (ref 0–2)
BILIRUB SERPL-MCNC: 0.4 MG/DL — SIGNIFICANT CHANGE UP (ref 0.2–1.2)
BUN SERPL-MCNC: 10 MG/DL — SIGNIFICANT CHANGE UP (ref 7–23)
CALCIUM SERPL-MCNC: 8.7 MG/DL — SIGNIFICANT CHANGE UP (ref 8.5–10.1)
CHLORIDE SERPL-SCNC: 108 MMOL/L — SIGNIFICANT CHANGE UP (ref 96–108)
CO2 SERPL-SCNC: 20 MMOL/L — LOW (ref 22–31)
CREAT SERPL-MCNC: 1.01 MG/DL — SIGNIFICANT CHANGE UP (ref 0.5–1.3)
EGFR: 88 ML/MIN/1.73M2 — SIGNIFICANT CHANGE UP
EOSINOPHIL # BLD AUTO: 0.2 K/UL — SIGNIFICANT CHANGE UP (ref 0–0.5)
EOSINOPHIL NFR BLD AUTO: 4.3 % — SIGNIFICANT CHANGE UP (ref 0–6)
FLUAV AG NPH QL: SIGNIFICANT CHANGE UP
FLUBV AG NPH QL: SIGNIFICANT CHANGE UP
GLUCOSE SERPL-MCNC: 91 MG/DL — SIGNIFICANT CHANGE UP (ref 70–99)
HCT VFR BLD CALC: 35.7 % — LOW (ref 39–50)
HGB BLD-MCNC: 11.1 G/DL — LOW (ref 13–17)
IMM GRANULOCYTES NFR BLD AUTO: 0.2 % — SIGNIFICANT CHANGE UP (ref 0–0.9)
LYMPHOCYTES # BLD AUTO: 0.72 K/UL — LOW (ref 1–3.3)
LYMPHOCYTES # BLD AUTO: 15.6 % — SIGNIFICANT CHANGE UP (ref 13–44)
MAGNESIUM SERPL-MCNC: 1.8 MG/DL — SIGNIFICANT CHANGE UP (ref 1.6–2.6)
MCHC RBC-ENTMCNC: 23.8 PG — LOW (ref 27–34)
MCHC RBC-ENTMCNC: 31.1 G/DL — LOW (ref 32–36)
MCV RBC AUTO: 76.4 FL — LOW (ref 80–100)
MONOCYTES # BLD AUTO: 0.66 K/UL — SIGNIFICANT CHANGE UP (ref 0–0.9)
MONOCYTES NFR BLD AUTO: 14.3 % — HIGH (ref 2–14)
NEUTROPHILS # BLD AUTO: 3.02 K/UL — SIGNIFICANT CHANGE UP (ref 1.8–7.4)
NEUTROPHILS NFR BLD AUTO: 65.2 % — SIGNIFICANT CHANGE UP (ref 43–77)
NRBC # BLD: 0 /100 WBCS — SIGNIFICANT CHANGE UP (ref 0–0)
PHOSPHATE SERPL-MCNC: 3 MG/DL — SIGNIFICANT CHANGE UP (ref 2.5–4.5)
PLATELET # BLD AUTO: 185 K/UL — SIGNIFICANT CHANGE UP (ref 150–400)
POTASSIUM SERPL-MCNC: 3.7 MMOL/L — SIGNIFICANT CHANGE UP (ref 3.5–5.3)
POTASSIUM SERPL-SCNC: 3.7 MMOL/L — SIGNIFICANT CHANGE UP (ref 3.5–5.3)
PROT SERPL-MCNC: 7 GM/DL — SIGNIFICANT CHANGE UP (ref 6–8.3)
RBC # BLD: 4.67 M/UL — SIGNIFICANT CHANGE UP (ref 4.2–5.8)
RBC # FLD: 20.4 % — HIGH (ref 10.3–14.5)
SARS-COV-2 RNA SPEC QL NAA+PROBE: SIGNIFICANT CHANGE UP
SODIUM SERPL-SCNC: 138 MMOL/L — SIGNIFICANT CHANGE UP (ref 135–145)
WBC # BLD: 4.63 K/UL — SIGNIFICANT CHANGE UP (ref 3.8–10.5)
WBC # FLD AUTO: 4.63 K/UL — SIGNIFICANT CHANGE UP (ref 3.8–10.5)

## 2022-10-06 PROCEDURE — 76937 US GUIDE VASCULAR ACCESS: CPT | Mod: 26,59

## 2022-10-06 PROCEDURE — 36569 INSJ PICC 5 YR+ W/O IMAGING: CPT

## 2022-10-06 PROCEDURE — 99291 CRITICAL CARE FIRST HOUR: CPT

## 2022-10-06 RX ORDER — THIAMINE MONONITRATE (VIT B1) 100 MG
100 TABLET ORAL DAILY
Refills: 0 | Status: DISCONTINUED | OUTPATIENT
Start: 2022-10-07 | End: 2022-10-17

## 2022-10-06 RX ORDER — PHENOBARBITAL 60 MG
260 TABLET ORAL
Refills: 0 | Status: DISCONTINUED | OUTPATIENT
Start: 2022-10-06 | End: 2022-10-08

## 2022-10-06 RX ADMIN — Medication 260 MILLIGRAM(S): at 23:03

## 2022-10-06 RX ADMIN — Medication 260 MILLIGRAM(S): at 05:00

## 2022-10-06 RX ADMIN — PANTOPRAZOLE SODIUM 40 MILLIGRAM(S): 20 TABLET, DELAYED RELEASE ORAL at 12:17

## 2022-10-06 RX ADMIN — DEXMEDETOMIDINE HYDROCHLORIDE IN 0.9% SODIUM CHLORIDE 10.3 MICROGRAM(S)/KG/HR: 4 INJECTION INTRAVENOUS at 23:03

## 2022-10-06 RX ADMIN — ENOXAPARIN SODIUM 40 MILLIGRAM(S): 100 INJECTION SUBCUTANEOUS at 12:17

## 2022-10-06 RX ADMIN — DEXMEDETOMIDINE HYDROCHLORIDE IN 0.9% SODIUM CHLORIDE 10.3 MICROGRAM(S)/KG/HR: 4 INJECTION INTRAVENOUS at 07:14

## 2022-10-06 RX ADMIN — Medication 260 MILLIGRAM(S): at 17:37

## 2022-10-06 RX ADMIN — Medication 260 MILLIGRAM(S): at 00:39

## 2022-10-06 RX ADMIN — CHLORHEXIDINE GLUCONATE 1 APPLICATION(S): 213 SOLUTION TOPICAL at 07:14

## 2022-10-06 RX ADMIN — Medication 260 MILLIGRAM(S): at 12:16

## 2022-10-06 RX ADMIN — Medication 1 MILLIGRAM(S): at 12:17

## 2022-10-06 RX ADMIN — DEXMEDETOMIDINE HYDROCHLORIDE IN 0.9% SODIUM CHLORIDE 10.3 MICROGRAM(S)/KG/HR: 4 INJECTION INTRAVENOUS at 02:28

## 2022-10-06 RX ADMIN — Medication 1 TABLET(S): at 12:17

## 2022-10-06 NOTE — PROVIDER CONTACT NOTE (EICU) - ACTION/TREATMENT ORDERED:
Bedside team request that TLC order d/c'ed - removed this morning at 6AM Bedside team request that TLC order d/c'ed since TLC removed this morning at 6AM - order discontinued

## 2022-10-06 NOTE — PROGRESS NOTE ADULT - ASSESSMENT
57 yo M with h/o chronic EtOH abuse and multiple admissions for DTs, alcoholic gastritis, multiple episodes of aspiration PNA requiring intubations. Presents 2 to abdominal pain and vomiting; admitted to [facility] for withdrawal symptoms and also started on Abx for possible partotitis. Pt transferred to ICU for worsening agitation requiring Precedex and Phenobarb.    Neuro/Psych: Cont standing Phenobarb, weaning off Precedex  Resp: Sputum production, but no hypoxia or SOB continue to monitor ABx if decompensates. End tidal monitoring when sedated.   Renal No issues saxena in place, will TOV  ID: Off Abx  HEME: DVT prophylaxis with Lovenox  FEN: Po diet as MS permits/ Cont IVF/ Daily Thiamine, MVI and Folate  Endo: No issues monitor FS  Progonisis long term is poor.  But should be out of withdrawal in the next few days.

## 2022-10-06 NOTE — PROGRESS NOTE ADULT - SUBJECTIVE AND OBJECTIVE BOX
CHIEF COMPLAINT:Patient is a 57y old  Male who presents with a chief complaint of alcohol gastritis, withdrawal (05 Oct 2022 13:18)        Interval Events:  No event     REVIEW OF SYSTEMS:  Constitutional: [ ] negative [ ] fevers [ ] chills [ ] weight loss [ ] weight gain  HEENT: [ ] negative [ ] dry eyes [ ] eye irritation [ ] postnasal drip [ ] nasal congestion  CV: [ ] negative  [ ] chest pain [ ] orthopnea [ ] palpitations [ ] murmur  Resp: [ ] negative [ ] cough [ ] shortness of breath [ ] dyspnea [ ] wheezing [ ] sputum [ ] hemoptysis  GI: [ ] negative [ ] nausea [ ] vomiting [ ] diarrhea [ ] constipation [ ] abd pain [ ] dysphagia   : [ ] negative [ ] dysuria [ ] nocturia [ ] hematuria [ ] increased urinary frequency  Musculoskeletal: [ ] negative [ ] back pain [ ] myalgias [ ] arthralgias [ ] fracture  Skin: [ ] negative [ ] rash [ ] itch  Neurological: [ ] negative [ ] headache [ ] dizziness [ ] syncope [ ] weakness [ ] numbness  Psychiatric: [ ] negative [ ] anxiety [ ] depression  Endocrine: [ ] negative [ ] diabetes [ ] thyroid problem  Hematologic/Lymphatic: [ ] negative [ ] anemia [ ] bleeding problem  Allergic/Immunologic: [ ] negative [ ] itchy eyes [ ] nasal discharge [ ] hives [ ] angioedema  [ ] All other systems negative  [ ] Unable to assess ROS because ________    OBJECTIVE:  ICU Vital Signs Last 24 Hrs  T(C): 36.5 (06 Oct 2022 16:06), Max: 36.9 (06 Oct 2022 04:00)  T(F): 97.7 (06 Oct 2022 16:06), Max: 98.5 (06 Oct 2022 04:00)  HR: 80 (06 Oct 2022 16:00) (70 - 110)  BP: 116/75 (06 Oct 2022 16:00) (84/51 - 134/81)  BP(mean): 85 (06 Oct 2022 16:00) (56 - 91)  ABP: --  ABP(mean): --  RR: 17 (06 Oct 2022 16:00) (13 - 26)  SpO2: 96% (06 Oct 2022 16:00) (89% - 97%)    O2 Parameters below as of 06 Oct 2022 07:15  Patient On (Oxygen Delivery Method): room air              10-05 @ 07:01  -  10-06 @ 07:00  --------------------------------------------------------  IN: 507.6 mL / OUT: 1125 mL / NET: -617.4 mL    10-06 @ 07:01  -  10-06 @ 16:11  --------------------------------------------------------  IN: 109.5 mL / OUT: 175 mL / NET: -65.5 mL      CAPILLARY BLOOD GLUCOSE          PHYSICAL EXAM:  General:   HEENT:   Lymph Nodes:  Neck:   Respiratory:   Cardiovascular:   Abdomen:   Extremities:   Skin:   Neurological:  Psychiatry:    LINES:    HOSPITAL MEDICATIONS:  Standing Meds:  chlorhexidine 2% Cloths 1 Application(s) Topical <User Schedule>  dexMEDEtomidine Infusion 0.5 MICROgram(s)/kG/Hr IV Continuous <Continuous>  enoxaparin Injectable 40 milliGRAM(s) SubCutaneous every 24 hours  folic acid Injectable 1 milliGRAM(s) IV Push daily  influenza   Vaccine 0.5 milliLiter(s) IntraMuscular once  multivitamin 1 Tablet(s) Oral daily  pantoprazole  Injectable 40 milliGRAM(s) IV Push daily  PHENobarbital Injectable 260 milliGRAM(s) IV Push every 6 hours      PRN Meds:  acetaminophen     Tablet .. 650 milliGRAM(s) Oral every 6 hours PRN  ondansetron Injectable 4 milliGRAM(s) IV Push every 6 hours PRN  PHENobarbital Injectable 260 milliGRAM(s) IV Push every 2 hours PRN  sodium chloride 0.9% lock flush 10 milliLiter(s) IV Push every 1 hour PRN      LABS:                        11.1   4.63  )-----------( 185      ( 06 Oct 2022 02:30 )             35.7     Hgb Trend: 11.1<--, 11.1<--, 10.2<--, 11.5<--, 10.7<--  10-06    138  |  108  |  10  ----------------------------<  91  3.7   |  20<L>  |  1.01    Ca    8.7      06 Oct 2022 02:30  Phos  3.0     10-06  Mg     1.8     10-06    TPro  7.0  /  Alb  2.7<L>  /  TBili  0.4  /  DBili  x   /  AST  19  /  ALT  21  /  AlkPhos  48  10-06    Creatinine Trend: 1.01<--, 0.91<--, 0.83<--, 1.12<--, 0.79<--, 0.92<--            MICROBIOLOGY:     RADIOLOGY:  [ ] Reviewed and interpreted by me    EKG:   CHIEF COMPLAINT:Patient is a 57y old  Male who presents with a chief complaint of alcohol gastritis, withdrawal (05 Oct 2022 13:18)        Interval Events:  No event     REVIEW OF SYSTEMS:  Constitutional: [ ] negative [ ] fevers [ ] chills [ ] weight loss [ ] weight gain  HEENT: [ ] negative [ ] dry eyes [ ] eye irritation [ ] postnasal drip [ ] nasal congestion  CV: [ ] negative  [ ] chest pain [ ] orthopnea [ ] palpitations [ ] murmur  Resp: [ ] negative [ ] cough [ ] shortness of breath [ ] dyspnea [ ] wheezing [ ] sputum [ ] hemoptysis  GI: [ ] negative [ ] nausea [ ] vomiting [ ] diarrhea [ ] constipation [ ] abd pain [ ] dysphagia   : [ ] negative [ ] dysuria [ ] nocturia [ ] hematuria [ ] increased urinary frequency  Musculoskeletal: [ ] negative [ ] back pain [ ] myalgias [ ] arthralgias [ ] fracture  Skin: [ ] negative [ ] rash [ ] itch  Neurological: [ ] negative [ ] headache [ ] dizziness [ ] syncope [ ] weakness [ ] numbness  Psychiatric: [ ] negative [ ] anxiety [ ] depression  Endocrine: [ ] negative [ ] diabetes [ ] thyroid problem  Hematologic/Lymphatic: [ ] negative [ ] anemia [ ] bleeding problem  Allergic/Immunologic: [ ] negative [ ] itchy eyes [ ] nasal discharge [ ] hives [ ] angioedema  [ ] All other systems negative  [ ] Unable to assess ROS because ________    OBJECTIVE:  ICU Vital Signs Last 24 Hrs  T(C): 36.5 (06 Oct 2022 16:06), Max: 36.9 (06 Oct 2022 04:00)  T(F): 97.7 (06 Oct 2022 16:06), Max: 98.5 (06 Oct 2022 04:00)  HR: 80 (06 Oct 2022 16:00) (70 - 110)  BP: 116/75 (06 Oct 2022 16:00) (84/51 - 134/81)  BP(mean): 85 (06 Oct 2022 16:00) (56 - 91)  ABP: --  ABP(mean): --  RR: 17 (06 Oct 2022 16:00) (13 - 26)  SpO2: 96% (06 Oct 2022 16:00) (89% - 97%)    O2 Parameters below as of 06 Oct 2022 07:15  Patient On (Oxygen Delivery Method): room air              10-05 @ 07:01  -  10-06 @ 07:00  --------------------------------------------------------  IN: 507.6 mL / OUT: 1125 mL / NET: -617.4 mL    10-06 @ 07:01  -  10-06 @ 16:11  --------------------------------------------------------  IN: 109.5 mL / OUT: 175 mL / NET: -65.5 mL      CAPILLARY BLOOD GLUCOSE          PHYSICAL EXAM:  Gen: lying comfortably in bed in no apparent distress, no longer speakking or understanding english  Nose: nc CO2 monitor  Lungs: CTA  Heart: RRR  Abd: Soft/+BS/ Non-tender  Ext: No edema  Neuro: Calm, lightly sedated    LINES:    HOSPITAL MEDICATIONS:  Standing Meds:  chlorhexidine 2% Cloths 1 Application(s) Topical <User Schedule>  dexMEDEtomidine Infusion 0.5 MICROgram(s)/kG/Hr IV Continuous <Continuous>  enoxaparin Injectable 40 milliGRAM(s) SubCutaneous every 24 hours  folic acid Injectable 1 milliGRAM(s) IV Push daily  influenza   Vaccine 0.5 milliLiter(s) IntraMuscular once  multivitamin 1 Tablet(s) Oral daily  pantoprazole  Injectable 40 milliGRAM(s) IV Push daily  PHENobarbital Injectable 260 milliGRAM(s) IV Push every 6 hours      PRN Meds:  acetaminophen     Tablet .. 650 milliGRAM(s) Oral every 6 hours PRN  ondansetron Injectable 4 milliGRAM(s) IV Push every 6 hours PRN  PHENobarbital Injectable 260 milliGRAM(s) IV Push every 2 hours PRN  sodium chloride 0.9% lock flush 10 milliLiter(s) IV Push every 1 hour PRN      LABS:                        11.1   4.63  )-----------( 185      ( 06 Oct 2022 02:30 )             35.7     Hgb Trend: 11.1<--, 11.1<--, 10.2<--, 11.5<--, 10.7<--  10-06    138  |  108  |  10  ----------------------------<  91  3.7   |  20<L>  |  1.01    Ca    8.7      06 Oct 2022 02:30  Phos  3.0     10-06  Mg     1.8     10-06    TPro  7.0  /  Alb  2.7<L>  /  TBili  0.4  /  DBili  x   /  AST  19  /  ALT  21  /  AlkPhos  48  10-06    Creatinine Trend: 1.01<--, 0.91<--, 0.83<--, 1.12<--, 0.79<--, 0.92<--            MICROBIOLOGY:     RADIOLOGY:  [ ] Reviewed and interpreted by me    EKG:

## 2022-10-07 LAB
ALBUMIN SERPL ELPH-MCNC: 2.8 G/DL — LOW (ref 3.3–5)
ALP SERPL-CCNC: 48 U/L — SIGNIFICANT CHANGE UP (ref 40–120)
ALT FLD-CCNC: 17 U/L — SIGNIFICANT CHANGE UP (ref 12–78)
ANION GAP SERPL CALC-SCNC: 10 MMOL/L — SIGNIFICANT CHANGE UP (ref 5–17)
AST SERPL-CCNC: 18 U/L — SIGNIFICANT CHANGE UP (ref 15–37)
BASOPHILS # BLD AUTO: 0.03 K/UL — SIGNIFICANT CHANGE UP (ref 0–0.2)
BASOPHILS NFR BLD AUTO: 0.8 % — SIGNIFICANT CHANGE UP (ref 0–2)
BILIRUB SERPL-MCNC: 0.3 MG/DL — SIGNIFICANT CHANGE UP (ref 0.2–1.2)
BUN SERPL-MCNC: 12 MG/DL — SIGNIFICANT CHANGE UP (ref 7–23)
CALCIUM SERPL-MCNC: 9 MG/DL — SIGNIFICANT CHANGE UP (ref 8.5–10.1)
CHLORIDE SERPL-SCNC: 111 MMOL/L — HIGH (ref 96–108)
CO2 SERPL-SCNC: 21 MMOL/L — LOW (ref 22–31)
CREAT SERPL-MCNC: 0.99 MG/DL — SIGNIFICANT CHANGE UP (ref 0.5–1.3)
EGFR: 90 ML/MIN/1.73M2 — SIGNIFICANT CHANGE UP
EOSINOPHIL # BLD AUTO: 0.25 K/UL — SIGNIFICANT CHANGE UP (ref 0–0.5)
EOSINOPHIL NFR BLD AUTO: 6.9 % — HIGH (ref 0–6)
GLUCOSE SERPL-MCNC: 92 MG/DL — SIGNIFICANT CHANGE UP (ref 70–99)
HCT VFR BLD CALC: 35 % — LOW (ref 39–50)
HGB BLD-MCNC: 11 G/DL — LOW (ref 13–17)
IMM GRANULOCYTES NFR BLD AUTO: 0.3 % — SIGNIFICANT CHANGE UP (ref 0–0.9)
LYMPHOCYTES # BLD AUTO: 1.42 K/UL — SIGNIFICANT CHANGE UP (ref 1–3.3)
LYMPHOCYTES # BLD AUTO: 39.2 % — SIGNIFICANT CHANGE UP (ref 13–44)
MAGNESIUM SERPL-MCNC: 1.9 MG/DL — SIGNIFICANT CHANGE UP (ref 1.6–2.6)
MCHC RBC-ENTMCNC: 23.8 PG — LOW (ref 27–34)
MCHC RBC-ENTMCNC: 31.4 G/DL — LOW (ref 32–36)
MCV RBC AUTO: 75.6 FL — LOW (ref 80–100)
MONOCYTES # BLD AUTO: 0.55 K/UL — SIGNIFICANT CHANGE UP (ref 0–0.9)
MONOCYTES NFR BLD AUTO: 15.2 % — HIGH (ref 2–14)
NEUTROPHILS # BLD AUTO: 1.36 K/UL — LOW (ref 1.8–7.4)
NEUTROPHILS NFR BLD AUTO: 37.6 % — LOW (ref 43–77)
NRBC # BLD: 0 /100 WBCS — SIGNIFICANT CHANGE UP (ref 0–0)
PHOSPHATE SERPL-MCNC: 2.9 MG/DL — SIGNIFICANT CHANGE UP (ref 2.5–4.5)
PLATELET # BLD AUTO: 202 K/UL — SIGNIFICANT CHANGE UP (ref 150–400)
POTASSIUM SERPL-MCNC: 3.5 MMOL/L — SIGNIFICANT CHANGE UP (ref 3.5–5.3)
POTASSIUM SERPL-SCNC: 3.5 MMOL/L — SIGNIFICANT CHANGE UP (ref 3.5–5.3)
PROT SERPL-MCNC: 7.1 GM/DL — SIGNIFICANT CHANGE UP (ref 6–8.3)
RBC # BLD: 4.63 M/UL — SIGNIFICANT CHANGE UP (ref 4.2–5.8)
RBC # FLD: 20.9 % — HIGH (ref 10.3–14.5)
SODIUM SERPL-SCNC: 142 MMOL/L — SIGNIFICANT CHANGE UP (ref 135–145)
WBC # BLD: 3.62 K/UL — LOW (ref 3.8–10.5)
WBC # FLD AUTO: 3.62 K/UL — LOW (ref 3.8–10.5)

## 2022-10-07 PROCEDURE — 99233 SBSQ HOSP IP/OBS HIGH 50: CPT

## 2022-10-07 RX ORDER — HALOPERIDOL DECANOATE 100 MG/ML
5 INJECTION INTRAMUSCULAR ONCE
Refills: 0 | Status: DISCONTINUED | OUTPATIENT
Start: 2022-10-07 | End: 2022-10-07

## 2022-10-07 RX ORDER — PHENOBARBITAL 60 MG
130 TABLET ORAL ONCE
Refills: 0 | Status: DISCONTINUED | OUTPATIENT
Start: 2022-10-07 | End: 2022-10-07

## 2022-10-07 RX ORDER — MAGNESIUM SULFATE 500 MG/ML
1 VIAL (ML) INJECTION ONCE
Refills: 0 | Status: COMPLETED | OUTPATIENT
Start: 2022-10-07 | End: 2022-10-07

## 2022-10-07 RX ORDER — POTASSIUM CHLORIDE 20 MEQ
10 PACKET (EA) ORAL
Refills: 0 | Status: COMPLETED | OUTPATIENT
Start: 2022-10-07 | End: 2022-10-07

## 2022-10-07 RX ORDER — PHENOBARBITAL 60 MG
260 TABLET ORAL EVERY 4 HOURS
Refills: 0 | Status: DISCONTINUED | OUTPATIENT
Start: 2022-10-07 | End: 2022-10-09

## 2022-10-07 RX ADMIN — Medication 260 MILLIGRAM(S): at 12:17

## 2022-10-07 RX ADMIN — Medication 260 MILLIGRAM(S): at 21:54

## 2022-10-07 RX ADMIN — Medication 130 MILLIGRAM(S): at 14:55

## 2022-10-07 RX ADMIN — Medication 1 MILLIGRAM(S): at 12:18

## 2022-10-07 RX ADMIN — Medication 100 MILLIEQUIVALENT(S): at 08:30

## 2022-10-07 RX ADMIN — Medication 260 MILLIGRAM(S): at 17:35

## 2022-10-07 RX ADMIN — Medication 100 MILLIEQUIVALENT(S): at 07:20

## 2022-10-07 RX ADMIN — PANTOPRAZOLE SODIUM 40 MILLIGRAM(S): 20 TABLET, DELAYED RELEASE ORAL at 12:18

## 2022-10-07 RX ADMIN — Medication 260 MILLIGRAM(S): at 10:05

## 2022-10-07 RX ADMIN — CHLORHEXIDINE GLUCONATE 1 APPLICATION(S): 213 SOLUTION TOPICAL at 07:22

## 2022-10-07 RX ADMIN — Medication 1 TABLET(S): at 12:18

## 2022-10-07 RX ADMIN — ENOXAPARIN SODIUM 40 MILLIGRAM(S): 100 INJECTION SUBCUTANEOUS at 12:19

## 2022-10-07 RX ADMIN — Medication 130 MILLIGRAM(S): at 15:42

## 2022-10-07 RX ADMIN — DEXMEDETOMIDINE HYDROCHLORIDE IN 0.9% SODIUM CHLORIDE 10.3 MICROGRAM(S)/KG/HR: 4 INJECTION INTRAVENOUS at 21:58

## 2022-10-07 RX ADMIN — Medication 260 MILLIGRAM(S): at 05:31

## 2022-10-07 RX ADMIN — Medication 100 MILLIEQUIVALENT(S): at 05:31

## 2022-10-07 RX ADMIN — Medication 100 GRAM(S): at 05:31

## 2022-10-07 RX ADMIN — DEXMEDETOMIDINE HYDROCHLORIDE IN 0.9% SODIUM CHLORIDE 10.3 MICROGRAM(S)/KG/HR: 4 INJECTION INTRAVENOUS at 07:20

## 2022-10-07 RX ADMIN — Medication 100 MILLIGRAM(S): at 12:18

## 2022-10-07 NOTE — PROGRESS NOTE ADULT - ASSESSMENT
Pt is a 55 yo M with h/o chronic EtOH abuse and multiple admissions for DTs, alcoholic gastritis, multiple episodes of aspiration PNA requiring intubations. Presents 2 to abdominal pain and vomiting; admitted to [facility] for withdrawal symptoms and also started on Abx for possible parotitis. Pt transferred to ICU for worsening agitation requiring Precedex and Phenobarb.     Resp: Elevate HOB/ Cont CO2 monitoring  ID: Off Abx  HEME: DVT prophylaxis with Lovenox  FEN: Po diet as MS permits/ Daily Thiamine, MVI and Folate  Endo: Follow Glu  Neuro/Psych: Cont standing Phenobarb + prn and wean off Precedex as tolerated

## 2022-10-07 NOTE — PROGRESS NOTE ADULT - SUBJECTIVE AND OBJECTIVE BOX
HPI:  Pt is a 57 yo M with h/o chronic EtOH abuse and multiple admissions for DTs, alcoholic gastritis, multiple episodes of aspiration PNA requiring intubations. Presents 2 to abdominal pain and vomiting; admitted to [facility] for withdrawal symptoms and also started on Abx for possible parotitis. Pt transferred to ICU for worsening agitation requiring Precedex and Phenobarb      ## Labs:  CBC:                        11.0   3.62  )-----------( 202      ( 07 Oct 2022 02:30 )             35.0     Chem:  10-07    142  |  111<H>  |  12  ----------------------------<  92  3.5   |  21<L>  |  0.99    Ca    9.0      07 Oct 2022 02:30  Phos  2.9     10-07  Mg     1.9     10-07    TPro  7.1  /  Alb  2.8<L>  /  TBili  0.3  /  DBili  x   /  AST  18  /  ALT  17  /  AlkPhos  48  10-07    Coags:          ## Imaging:    ## Medications:          enoxaparin Injectable 40 milliGRAM(s) SubCutaneous every 24 hours    pantoprazole  Injectable 40 milliGRAM(s) IV Push daily    acetaminophen     Tablet .. 650 milliGRAM(s) Oral every 6 hours PRN  dexMEDEtomidine Infusion 0.5 MICROgram(s)/kG/Hr IV Continuous <Continuous>  ondansetron Injectable 4 milliGRAM(s) IV Push every 6 hours PRN  PHENobarbital Injectable 260 milliGRAM(s) IV Push every 4 hours  PHENobarbital Injectable 260 milliGRAM(s) IV Push every 2 hours PRN      ## Vitals:  T(C): 36.7 (10-07-22 @ 11:30), Max: 36.9 (10-06-22 @ 19:11)  HR: 85 (10-07-22 @ 14:00) (49 - 110)  BP: 88/57 (10-07-22 @ 14:00) (88/57 - 146/90)  BP(mean): 63 (10-07-22 @ 14:00) (63 - 111)  RR: 12 (10-07-22 @ 14:00) (9 - 24)  SpO2: 92% (10-07-22 @ 14:00) (92% - 100%)  Wt(kg): --  Vent:   ABG:       10-06 @ 07:01  -  10-07 @ 07:00  --------------------------------------------------------  IN: 519.9 mL / OUT: 520 mL / NET: -0.1 mL          ## P/E:  Gen: lying comfortably in bed in no apparent distress  Nose: nc CO2 monitor  Lungs: CTA  Heart: RRR  Abd: Soft/+BS/ Non-tender  Ext: No edema  Neuro: Confused    CENTRAL LINE: [ ] YES [ ] NO  LOCATION: L femoral  DATE INSERTED:  REMOVE: [ ] YES [ ] NO      CHAN: [ ] YES [ ] NO    DATE INSERTED:  REMOVE:  [ ] YES [ ] NO      A-LINE:  [ ] YES [ ] NO  LOCATION:   DATE INSERTED:  REMOVE:  [ ] YES [ ] NO  EXPLAIN:    CODE STATUS: [x ] full code  [ ] DNR  [ ] DNI  [ ] SYEDA  Goals of care discussion: [ ] yes Goals of care discussion: [ ] yes

## 2022-10-08 LAB
ALBUMIN SERPL ELPH-MCNC: 2.7 G/DL — LOW (ref 3.3–5)
ALP SERPL-CCNC: 45 U/L — SIGNIFICANT CHANGE UP (ref 40–120)
ALT FLD-CCNC: 18 U/L — SIGNIFICANT CHANGE UP (ref 12–78)
ANION GAP SERPL CALC-SCNC: 9 MMOL/L — SIGNIFICANT CHANGE UP (ref 5–17)
AST SERPL-CCNC: 14 U/L — LOW (ref 15–37)
BASOPHILS # BLD AUTO: 0.03 K/UL — SIGNIFICANT CHANGE UP (ref 0–0.2)
BASOPHILS NFR BLD AUTO: 0.7 % — SIGNIFICANT CHANGE UP (ref 0–2)
BILIRUB SERPL-MCNC: 0.3 MG/DL — SIGNIFICANT CHANGE UP (ref 0.2–1.2)
BUN SERPL-MCNC: 13 MG/DL — SIGNIFICANT CHANGE UP (ref 7–23)
CALCIUM SERPL-MCNC: 8.9 MG/DL — SIGNIFICANT CHANGE UP (ref 8.5–10.1)
CHLORIDE SERPL-SCNC: 108 MMOL/L — SIGNIFICANT CHANGE UP (ref 96–108)
CO2 SERPL-SCNC: 22 MMOL/L — SIGNIFICANT CHANGE UP (ref 22–31)
CREAT SERPL-MCNC: 1.06 MG/DL — SIGNIFICANT CHANGE UP (ref 0.5–1.3)
EGFR: 83 ML/MIN/1.73M2 — SIGNIFICANT CHANGE UP
EOSINOPHIL # BLD AUTO: 0.17 K/UL — SIGNIFICANT CHANGE UP (ref 0–0.5)
EOSINOPHIL NFR BLD AUTO: 4.2 % — SIGNIFICANT CHANGE UP (ref 0–6)
GLUCOSE SERPL-MCNC: 82 MG/DL — SIGNIFICANT CHANGE UP (ref 70–99)
HCT VFR BLD CALC: 33.6 % — LOW (ref 39–50)
HGB BLD-MCNC: 10.2 G/DL — LOW (ref 13–17)
IMM GRANULOCYTES NFR BLD AUTO: 0.2 % — SIGNIFICANT CHANGE UP (ref 0–0.9)
LYMPHOCYTES # BLD AUTO: 1.15 K/UL — SIGNIFICANT CHANGE UP (ref 1–3.3)
LYMPHOCYTES # BLD AUTO: 28.7 % — SIGNIFICANT CHANGE UP (ref 13–44)
MAGNESIUM SERPL-MCNC: 2.2 MG/DL — SIGNIFICANT CHANGE UP (ref 1.6–2.6)
MCHC RBC-ENTMCNC: 23.2 PG — LOW (ref 27–34)
MCHC RBC-ENTMCNC: 30.4 G/DL — LOW (ref 32–36)
MCV RBC AUTO: 76.5 FL — LOW (ref 80–100)
MONOCYTES # BLD AUTO: 0.54 K/UL — SIGNIFICANT CHANGE UP (ref 0–0.9)
MONOCYTES NFR BLD AUTO: 13.5 % — SIGNIFICANT CHANGE UP (ref 2–14)
NEUTROPHILS # BLD AUTO: 2.11 K/UL — SIGNIFICANT CHANGE UP (ref 1.8–7.4)
NEUTROPHILS NFR BLD AUTO: 52.7 % — SIGNIFICANT CHANGE UP (ref 43–77)
NRBC # BLD: 0 /100 WBCS — SIGNIFICANT CHANGE UP (ref 0–0)
PHOSPHATE SERPL-MCNC: 3.5 MG/DL — SIGNIFICANT CHANGE UP (ref 2.5–4.5)
PLATELET # BLD AUTO: 233 K/UL — SIGNIFICANT CHANGE UP (ref 150–400)
POTASSIUM SERPL-MCNC: 3.6 MMOL/L — SIGNIFICANT CHANGE UP (ref 3.5–5.3)
POTASSIUM SERPL-SCNC: 3.6 MMOL/L — SIGNIFICANT CHANGE UP (ref 3.5–5.3)
PROT SERPL-MCNC: 7.2 GM/DL — SIGNIFICANT CHANGE UP (ref 6–8.3)
RBC # BLD: 4.39 M/UL — SIGNIFICANT CHANGE UP (ref 4.2–5.8)
RBC # FLD: 20.6 % — HIGH (ref 10.3–14.5)
SODIUM SERPL-SCNC: 139 MMOL/L — SIGNIFICANT CHANGE UP (ref 135–145)
WBC # BLD: 4.01 K/UL — SIGNIFICANT CHANGE UP (ref 3.8–10.5)
WBC # FLD AUTO: 4.01 K/UL — SIGNIFICANT CHANGE UP (ref 3.8–10.5)

## 2022-10-08 PROCEDURE — 99291 CRITICAL CARE FIRST HOUR: CPT

## 2022-10-08 RX ORDER — SODIUM CHLORIDE 9 MG/ML
1000 INJECTION, SOLUTION INTRAVENOUS
Refills: 0 | Status: DISCONTINUED | OUTPATIENT
Start: 2022-10-08 | End: 2022-10-14

## 2022-10-08 RX ORDER — POTASSIUM CHLORIDE 20 MEQ
10 PACKET (EA) ORAL
Refills: 0 | Status: COMPLETED | OUTPATIENT
Start: 2022-10-08 | End: 2022-10-08

## 2022-10-08 RX ORDER — SODIUM CHLORIDE 9 MG/ML
1000 INJECTION, SOLUTION INTRAVENOUS ONCE
Refills: 0 | Status: COMPLETED | OUTPATIENT
Start: 2022-10-08 | End: 2022-10-08

## 2022-10-08 RX ORDER — OLANZAPINE 15 MG/1
5 TABLET, FILM COATED ORAL AT BEDTIME
Refills: 0 | Status: DISCONTINUED | OUTPATIENT
Start: 2022-10-08 | End: 2022-10-14

## 2022-10-08 RX ADMIN — Medication 260 MILLIGRAM(S): at 19:47

## 2022-10-08 RX ADMIN — SODIUM CHLORIDE 75 MILLILITER(S): 9 INJECTION, SOLUTION INTRAVENOUS at 16:11

## 2022-10-08 RX ADMIN — Medication 100 MILLIGRAM(S): at 11:14

## 2022-10-08 RX ADMIN — CHLORHEXIDINE GLUCONATE 1 APPLICATION(S): 213 SOLUTION TOPICAL at 04:23

## 2022-10-08 RX ADMIN — SODIUM CHLORIDE 500 MILLILITER(S): 9 INJECTION, SOLUTION INTRAVENOUS at 02:57

## 2022-10-08 RX ADMIN — DEXMEDETOMIDINE HYDROCHLORIDE IN 0.9% SODIUM CHLORIDE 10.3 MICROGRAM(S)/KG/HR: 4 INJECTION INTRAVENOUS at 06:57

## 2022-10-08 RX ADMIN — DEXMEDETOMIDINE HYDROCHLORIDE IN 0.9% SODIUM CHLORIDE 10.3 MICROGRAM(S)/KG/HR: 4 INJECTION INTRAVENOUS at 00:42

## 2022-10-08 RX ADMIN — Medication 260 MILLIGRAM(S): at 07:10

## 2022-10-08 RX ADMIN — Medication 100 MILLIEQUIVALENT(S): at 06:53

## 2022-10-08 RX ADMIN — ENOXAPARIN SODIUM 40 MILLIGRAM(S): 100 INJECTION SUBCUTANEOUS at 13:18

## 2022-10-08 RX ADMIN — PANTOPRAZOLE SODIUM 40 MILLIGRAM(S): 20 TABLET, DELAYED RELEASE ORAL at 11:14

## 2022-10-08 RX ADMIN — OLANZAPINE 5 MILLIGRAM(S): 15 TABLET, FILM COATED ORAL at 21:09

## 2022-10-08 RX ADMIN — Medication 1 TABLET(S): at 11:14

## 2022-10-08 RX ADMIN — Medication 260 MILLIGRAM(S): at 11:13

## 2022-10-08 RX ADMIN — Medication 1 MILLIGRAM(S): at 11:14

## 2022-10-08 RX ADMIN — Medication 260 MILLIGRAM(S): at 15:10

## 2022-10-08 RX ADMIN — Medication 260 MILLIGRAM(S): at 03:03

## 2022-10-08 RX ADMIN — Medication 100 MILLIEQUIVALENT(S): at 05:42

## 2022-10-08 RX ADMIN — Medication 100 MILLIEQUIVALENT(S): at 04:51

## 2022-10-08 NOTE — PROGRESS NOTE ADULT - ASSESSMENT
57 yo man with EtOH abuse and multiple admissions for DTs, alcoholic gastritis, multiple episodes of aspiration PNA requiring intubations. Presents 2 to abdominal pain and vomiting; admitted to F for withdrawal symptoms and also started on Abx for possible parotitis. Pt transferred to ICU for worsening agitation requiring Precedex and Phenobarb.   Neuro/Psych: Cont standing Phenobarb + prn and wean off Precedex as tolerated, will add Seroquelor zyprexa QHS if still inapropriate today  Resp: Elevate HOB/ Cont CO2 monitoring  ID: Off Abx  HEME: DVT prophylaxis with Lovenox  FEN: Po diet as MS permits/ Daily Thiamine, MVI and Folate  Endo: Follow Glu  Still requiring ICU care while on precedex.   55 yo man with EtOH abuse and multiple admissions for DTs, alcoholic gastritis, multiple episodes of aspiration PNA requiring intubations. Presents 2 to abdominal pain and vomiting; admitted to F for withdrawal symptoms and also started on Abx for possible parotitis. Pt transferred to ICU for worsening agitation requiring Precedex and Phenobarb.     Neuro/Psych: Cont standing Phenobarb + prn and wean off Precedex as tolerated, will add zyprexa QHS   Resp: Elevate HOB/ Cont CO2 monitoring  ID: Off Abx  HEME: DVT prophylaxis with Lovenox  FEN: Po diet as MS permits/ Daily Thiamine, MVI and Folate  Endo: Follow Glu  Still requiring ICU care while on precedex.

## 2022-10-08 NOTE — PROGRESS NOTE ADULT - SUBJECTIVE AND OBJECTIVE BOX
CHIEF COMPLAINT:Patient is a 57y old  Male who presents with a chief complaint of alcohol gastritis, withdrawal (07 Oct 2022 14:14)        Interval Events:    REVIEW OF SYSTEMS:  Constitutional: [ ] negative [ ] fevers [ ] chills [ ] weight loss [ ] weight gain  HEENT: [ ] negative [ ] dry eyes [ ] eye irritation [ ] postnasal drip [ ] nasal congestion  CV: [ ] negative  [ ] chest pain [ ] orthopnea [ ] palpitations [ ] murmur  Resp: [ ] negative [ ] cough [ ] shortness of breath [ ] dyspnea [ ] wheezing [ ] sputum [ ] hemoptysis  GI: [ ] negative [ ] nausea [ ] vomiting [ ] diarrhea [ ] constipation [ ] abd pain [ ] dysphagia   : [ ] negative [ ] dysuria [ ] nocturia [ ] hematuria [ ] increased urinary frequency  Musculoskeletal: [ ] negative [ ] back pain [ ] myalgias [ ] arthralgias [ ] fracture  Skin: [ ] negative [ ] rash [ ] itch  Neurological: [ ] negative [ ] headache [ ] dizziness [ ] syncope [ ] weakness [ ] numbness  Psychiatric: [ ] negative [ ] anxiety [ ] depression  Endocrine: [ ] negative [ ] diabetes [ ] thyroid problem  Hematologic/Lymphatic: [ ] negative [ ] anemia [ ] bleeding problem  Allergic/Immunologic: [ ] negative [ ] itchy eyes [ ] nasal discharge [ ] hives [ ] angioedema  [ ] All other systems negative  [ ] Unable to assess ROS because ________    OBJECTIVE:  ICU Vital Signs Last 24 Hrs  T(C): 36.4 (08 Oct 2022 03:27), Max: 37.5 (07 Oct 2022 15:45)  T(F): 97.5 (08 Oct 2022 03:27), Max: 99.5 (07 Oct 2022 15:45)  HR: 69 (08 Oct 2022 07:30) (69 - 112)  BP: 124/77 (08 Oct 2022 07:30) (78/57 - 146/90)  BP(mean): 88 (08 Oct 2022 07:30) (62 - 111)  ABP: --  ABP(mean): --  RR: 13 (08 Oct 2022 07:30) (12 - 25)  SpO2: 100% (08 Oct 2022 07:30) (92% - 100%)    O2 Parameters below as of 07 Oct 2022 19:00  Patient On (Oxygen Delivery Method): room air              10-07 @ 07:01  -  10-08 @ 07:00  --------------------------------------------------------  IN: 1602.3 mL / OUT: 550 mL / NET: 1052.3 mL      CAPILLARY BLOOD GLUCOSE          PHYSICAL EXAM:  Gen: lying comfortably in bed in no apparent distress  Nose: nc CO2 monitor  Lungs: CTA  Heart: RRR  Abd: Soft/+BS/ Non-tender  Ext: No edema  Neuro: Confused      LINES:    HOSPITAL MEDICATIONS:  Standing Meds:  chlorhexidine 2% Cloths 1 Application(s) Topical <User Schedule>  dexMEDEtomidine Infusion 0.5 MICROgram(s)/kG/Hr IV Continuous <Continuous>  enoxaparin Injectable 40 milliGRAM(s) SubCutaneous every 24 hours  folic acid Injectable 1 milliGRAM(s) IV Push daily  influenza   Vaccine 0.5 milliLiter(s) IntraMuscular once  multivitamin 1 Tablet(s) Oral daily  pantoprazole  Injectable 40 milliGRAM(s) IV Push daily  PHENobarbital Injectable 260 milliGRAM(s) IV Push every 4 hours  thiamine 100 milliGRAM(s) Oral daily      PRN Meds:  acetaminophen     Tablet .. 650 milliGRAM(s) Oral every 6 hours PRN  ondansetron Injectable 4 milliGRAM(s) IV Push every 6 hours PRN  PHENobarbital Injectable 260 milliGRAM(s) IV Push every 2 hours PRN      LABS:                        10.2   4.01  )-----------( 233      ( 08 Oct 2022 02:12 )             33.6     Hgb Trend: 10.2<--, 11.0<--, 11.1<--, 11.1<--, 10.2<--  10-08    139  |  108  |  13  ----------------------------<  82  3.6   |  22  |  1.06    Ca    8.9      08 Oct 2022 02:12  Phos  3.5     10-08  Mg     2.2     10-08    TPro  7.2  /  Alb  2.7<L>  /  TBili  0.3  /  DBili  x   /  AST  14<L>  /  ALT  18  /  AlkPhos  45  10-08    Creatinine Trend: 1.06<--, 0.99<--, 1.01<--, 0.91<--, 0.83<--, 1.12<--            MICROBIOLOGY:     RADIOLOGY:  [ ] Reviewed and interpreted by me    EKG:   CHIEF COMPLAINT:Patient is a 57y old  Male who presents with a chief complaint of alcohol gastritis, withdrawal (07 Oct 2022 14:14)        Interval Events:    REVIEW OF SYSTEMS:   [x] Unable to assess ROS because __mumbling in incomprehensible words to me and .______    OBJECTIVE:  ICU Vital Signs Last 24 Hrs  T(C): 36.4 (08 Oct 2022 03:27), Max: 37.5 (07 Oct 2022 15:45)  T(F): 97.5 (08 Oct 2022 03:27), Max: 99.5 (07 Oct 2022 15:45)  HR: 69 (08 Oct 2022 07:30) (69 - 112)  BP: 124/77 (08 Oct 2022 07:30) (78/57 - 146/90)  BP(mean): 88 (08 Oct 2022 07:30) (62 - 111)  ABP: --  ABP(mean): --  RR: 13 (08 Oct 2022 07:30) (12 - 25)  SpO2: 100% (08 Oct 2022 07:30) (92% - 100%)    O2 Parameters below as of 07 Oct 2022 19:00  Patient On (Oxygen Delivery Method): room air              10-07 @ 07:01  -  10-08 @ 07:00  --------------------------------------------------------  IN: 1602.3 mL / OUT: 550 mL / NET: 1052.3 mL      CAPILLARY BLOOD GLUCOSE          PHYSICAL EXAM:  Gen: lying comfortably in bed in no apparent distress  Nose: nc CO2 monitor  Lungs: CTA  Heart: RRR  Abd: Soft/+BS/ Non-tender  Ext: No edema  Neuro: Confused      LINES:    HOSPITAL MEDICATIONS:  Standing Meds:  chlorhexidine 2% Cloths 1 Application(s) Topical <User Schedule>  dexMEDEtomidine Infusion 0.5 MICROgram(s)/kG/Hr IV Continuous <Continuous>  enoxaparin Injectable 40 milliGRAM(s) SubCutaneous every 24 hours  folic acid Injectable 1 milliGRAM(s) IV Push daily  influenza   Vaccine 0.5 milliLiter(s) IntraMuscular once  multivitamin 1 Tablet(s) Oral daily  pantoprazole  Injectable 40 milliGRAM(s) IV Push daily  PHENobarbital Injectable 260 milliGRAM(s) IV Push every 4 hours  thiamine 100 milliGRAM(s) Oral daily      PRN Meds:  acetaminophen     Tablet .. 650 milliGRAM(s) Oral every 6 hours PRN  ondansetron Injectable 4 milliGRAM(s) IV Push every 6 hours PRN  PHENobarbital Injectable 260 milliGRAM(s) IV Push every 2 hours PRN      LABS:                        10.2   4.01  )-----------( 233      ( 08 Oct 2022 02:12 )             33.6     Hgb Trend: 10.2<--, 11.0<--, 11.1<--, 11.1<--, 10.2<--  10-08    139  |  108  |  13  ----------------------------<  82  3.6   |  22  |  1.06    Ca    8.9      08 Oct 2022 02:12  Phos  3.5     10-08  Mg     2.2     10-08    TPro  7.2  /  Alb  2.7<L>  /  TBili  0.3  /  DBili  x   /  AST  14<L>  /  ALT  18  /  AlkPhos  45  10-08    Creatinine Trend: 1.06<--, 0.99<--, 1.01<--, 0.91<--, 0.83<--, 1.12<--            MICROBIOLOGY:     RADIOLOGY:  [ ] Reviewed and interpreted by me    EKG:

## 2022-10-09 LAB
ANION GAP SERPL CALC-SCNC: 13 MMOL/L — SIGNIFICANT CHANGE UP (ref 5–17)
BUN SERPL-MCNC: 8 MG/DL — SIGNIFICANT CHANGE UP (ref 7–23)
CALCIUM SERPL-MCNC: 8.8 MG/DL — SIGNIFICANT CHANGE UP (ref 8.5–10.1)
CHLORIDE SERPL-SCNC: 106 MMOL/L — SIGNIFICANT CHANGE UP (ref 96–108)
CO2 SERPL-SCNC: 20 MMOL/L — LOW (ref 22–31)
CREAT SERPL-MCNC: 0.91 MG/DL — SIGNIFICANT CHANGE UP (ref 0.5–1.3)
EGFR: 100 ML/MIN/1.73M2 — SIGNIFICANT CHANGE UP
GAS PNL BLDA: SIGNIFICANT CHANGE UP
GLUCOSE SERPL-MCNC: 65 MG/DL — LOW (ref 70–99)
GRAM STN FLD: SIGNIFICANT CHANGE UP
HCT VFR BLD CALC: 34.1 % — LOW (ref 39–50)
HGB BLD-MCNC: 10.5 G/DL — LOW (ref 13–17)
MAGNESIUM SERPL-MCNC: 1.9 MG/DL — SIGNIFICANT CHANGE UP (ref 1.6–2.6)
MCHC RBC-ENTMCNC: 23.6 PG — LOW (ref 27–34)
MCHC RBC-ENTMCNC: 30.8 G/DL — LOW (ref 32–36)
MCV RBC AUTO: 76.8 FL — LOW (ref 80–100)
NRBC # BLD: 0 /100 WBCS — SIGNIFICANT CHANGE UP (ref 0–0)
PHOSPHATE SERPL-MCNC: 2.7 MG/DL — SIGNIFICANT CHANGE UP (ref 2.5–4.5)
PLATELET # BLD AUTO: 291 K/UL — SIGNIFICANT CHANGE UP (ref 150–400)
POTASSIUM SERPL-MCNC: 3.5 MMOL/L — SIGNIFICANT CHANGE UP (ref 3.5–5.3)
POTASSIUM SERPL-SCNC: 3.5 MMOL/L — SIGNIFICANT CHANGE UP (ref 3.5–5.3)
RAPID RVP RESULT: SIGNIFICANT CHANGE UP
RBC # BLD: 4.44 M/UL — SIGNIFICANT CHANGE UP (ref 4.2–5.8)
RBC # FLD: 20.4 % — HIGH (ref 10.3–14.5)
SARS-COV-2 RNA SPEC QL NAA+PROBE: SIGNIFICANT CHANGE UP
SODIUM SERPL-SCNC: 139 MMOL/L — SIGNIFICANT CHANGE UP (ref 135–145)
SPECIMEN SOURCE: SIGNIFICANT CHANGE UP
WBC # BLD: 4.31 K/UL — SIGNIFICANT CHANGE UP (ref 3.8–10.5)
WBC # FLD AUTO: 4.31 K/UL — SIGNIFICANT CHANGE UP (ref 3.8–10.5)

## 2022-10-09 PROCEDURE — 71045 X-RAY EXAM CHEST 1 VIEW: CPT | Mod: 26

## 2022-10-09 PROCEDURE — 99291 CRITICAL CARE FIRST HOUR: CPT

## 2022-10-09 RX ORDER — PHENOBARBITAL 60 MG
130 TABLET ORAL EVERY 6 HOURS
Refills: 0 | Status: DISCONTINUED | OUTPATIENT
Start: 2022-10-09 | End: 2022-10-09

## 2022-10-09 RX ORDER — AMPICILLIN SODIUM AND SULBACTAM SODIUM 250; 125 MG/ML; MG/ML
3 INJECTION, POWDER, FOR SUSPENSION INTRAMUSCULAR; INTRAVENOUS EVERY 6 HOURS
Refills: 0 | Status: COMPLETED | OUTPATIENT
Start: 2022-10-09 | End: 2022-10-14

## 2022-10-09 RX ORDER — AMPICILLIN SODIUM AND SULBACTAM SODIUM 250; 125 MG/ML; MG/ML
3 INJECTION, POWDER, FOR SUSPENSION INTRAMUSCULAR; INTRAVENOUS ONCE
Refills: 0 | Status: COMPLETED | OUTPATIENT
Start: 2022-10-09 | End: 2022-10-09

## 2022-10-09 RX ORDER — ACETAMINOPHEN 500 MG
650 TABLET ORAL ONCE
Refills: 0 | Status: COMPLETED | OUTPATIENT
Start: 2022-10-09 | End: 2022-10-09

## 2022-10-09 RX ORDER — AMPICILLIN SODIUM AND SULBACTAM SODIUM 250; 125 MG/ML; MG/ML
INJECTION, POWDER, FOR SUSPENSION INTRAMUSCULAR; INTRAVENOUS
Refills: 0 | Status: COMPLETED | OUTPATIENT
Start: 2022-10-09 | End: 2022-10-14

## 2022-10-09 RX ORDER — MAGNESIUM SULFATE 500 MG/ML
2 VIAL (ML) INJECTION ONCE
Refills: 0 | Status: COMPLETED | OUTPATIENT
Start: 2022-10-09 | End: 2022-10-09

## 2022-10-09 RX ORDER — POTASSIUM CHLORIDE 20 MEQ
10 PACKET (EA) ORAL
Refills: 0 | Status: COMPLETED | OUTPATIENT
Start: 2022-10-09 | End: 2022-10-09

## 2022-10-09 RX ORDER — PHENOBARBITAL 60 MG
130 TABLET ORAL EVERY 6 HOURS
Refills: 0 | Status: DISCONTINUED | OUTPATIENT
Start: 2022-10-09 | End: 2022-10-12

## 2022-10-09 RX ADMIN — SODIUM CHLORIDE 75 MILLILITER(S): 9 INJECTION, SOLUTION INTRAVENOUS at 18:24

## 2022-10-09 RX ADMIN — Medication 130 MILLIGRAM(S): at 05:46

## 2022-10-09 RX ADMIN — PANTOPRAZOLE SODIUM 40 MILLIGRAM(S): 20 TABLET, DELAYED RELEASE ORAL at 11:55

## 2022-10-09 RX ADMIN — Medication 650 MILLIGRAM(S): at 05:47

## 2022-10-09 RX ADMIN — Medication 100 MILLIEQUIVALENT(S): at 04:02

## 2022-10-09 RX ADMIN — Medication 650 MILLIGRAM(S): at 06:15

## 2022-10-09 RX ADMIN — ENOXAPARIN SODIUM 40 MILLIGRAM(S): 100 INJECTION SUBCUTANEOUS at 12:58

## 2022-10-09 RX ADMIN — AMPICILLIN SODIUM AND SULBACTAM SODIUM 200 GRAM(S): 250; 125 INJECTION, POWDER, FOR SUSPENSION INTRAMUSCULAR; INTRAVENOUS at 10:05

## 2022-10-09 RX ADMIN — Medication 25 GRAM(S): at 04:02

## 2022-10-09 RX ADMIN — SODIUM CHLORIDE 75 MILLILITER(S): 9 INJECTION, SOLUTION INTRAVENOUS at 06:00

## 2022-10-09 RX ADMIN — Medication 100 MILLIEQUIVALENT(S): at 06:39

## 2022-10-09 RX ADMIN — CHLORHEXIDINE GLUCONATE 1 APPLICATION(S): 213 SOLUTION TOPICAL at 05:09

## 2022-10-09 RX ADMIN — Medication 100 MILLIEQUIVALENT(S): at 05:12

## 2022-10-09 RX ADMIN — Medication 1 MILLIGRAM(S): at 11:55

## 2022-10-09 RX ADMIN — AMPICILLIN SODIUM AND SULBACTAM SODIUM 200 GRAM(S): 250; 125 INJECTION, POWDER, FOR SUSPENSION INTRAMUSCULAR; INTRAVENOUS at 18:01

## 2022-10-09 NOTE — PROGRESS NOTE ADULT - SUBJECTIVE AND OBJECTIVE BOX
CHIEF COMPLAINT:Patient is a 57y old  Male who presents with a chief complaint of alcohol gastritis, withdrawal (08 Oct 2022 07:45)        Interval Events:  less agitated but now maybe over sedated with Fever and worsening Respiratory clearance    REVIEW OF SYSTEMS:  Lethargic    [ ] Unable to assess ROS because ________    OBJECTIVE:  ICU Vital Signs Last 24 Hrs  T(C): 36.5 (09 Oct 2022 11:09), Max: 38.8 (09 Oct 2022 05:08)  T(F): 97.7 (09 Oct 2022 11:09), Max: 101.9 (09 Oct 2022 05:08)  HR: 82 (09 Oct 2022 12:00) (82 - 113)  BP: 124/77 (09 Oct 2022 12:00) (116/72 - 164/96)  BP(mean): 86 (09 Oct 2022 12:00) (82 - 121)  ABP: --  ABP(mean): --  RR: 11 (09 Oct 2022 12:00) (11 - 31)  SpO2: 100% (09 Oct 2022 12:00) (97% - 100%)    O2 Parameters below as of 08 Oct 2022 19:00  Patient On (Oxygen Delivery Method): nasal cannula  O2 Flow (L/min): 2            10-08 @ 07:01  -  10-09 @ 07:00  --------------------------------------------------------  IN: 1287.6 mL / OUT: 1170 mL / NET: 117.6 mL    10-09 @ 07:01  -  10-09 @ 13:23  --------------------------------------------------------  IN: 550 mL / OUT: 375 mL / NET: 175 mL      CAPILLARY BLOOD GLUCOSE          PHYSICAL EXAM:  General:   HEENT:   Lymph Nodes:  Neck:   Respiratory:   Cardiovascular:   Abdomen:   Extremities:   Skin:   Neurological:  Psychiatry:    LINES:    HOSPITAL MEDICATIONS:  Standing Meds:  ampicillin/sulbactam  IVPB      ampicillin/sulbactam  IVPB 3 Gram(s) IV Intermittent every 6 hours  chlorhexidine 2% Cloths 1 Application(s) Topical <User Schedule>  enoxaparin Injectable 40 milliGRAM(s) SubCutaneous every 24 hours  folic acid Injectable 1 milliGRAM(s) IV Push daily  influenza   Vaccine 0.5 milliLiter(s) IntraMuscular once  lactated ringers. 1000 milliLiter(s) IV Continuous <Continuous>  multivitamin 1 Tablet(s) Oral daily  OLANZapine Disintegrating Tablet 5 milliGRAM(s) Oral at bedtime  pantoprazole  Injectable 40 milliGRAM(s) IV Push daily  thiamine 100 milliGRAM(s) Oral daily      PRN Meds:  acetaminophen     Tablet .. 650 milliGRAM(s) Oral every 6 hours PRN  ondansetron Injectable 4 milliGRAM(s) IV Push every 6 hours PRN  PHENobarbital Injectable 130 milliGRAM(s) IV Push every 6 hours PRN      LABS:                        10.5   4.31  )-----------( 291      ( 09 Oct 2022 02:00 )             34.1     Hgb Trend: 10.5<--, 10.2<--, 11.0<--, 11.1<--, 11.1<--  10-09    139  |  106  |  8   ----------------------------<  65<L>  3.5   |  20<L>  |  0.91    Ca    8.8      09 Oct 2022 02:00  Phos  2.7     10-09  Mg     1.9     10-09    TPro  7.2  /  Alb  2.7<L>  /  TBili  0.3  /  DBili  x   /  AST  14<L>  /  ALT  18  /  AlkPhos  45  10-08    Creatinine Trend: 0.91<--, 1.06<--, 0.99<--, 1.01<--, 0.91<--, 0.83<--            MICROBIOLOGY:     RADIOLOGY:  [ ] Reviewed and interpreted by me    EKG:   CHIEF COMPLAINT:Patient is a 57y old  Male who presents with a chief complaint of alcohol gastritis, withdrawal (08 Oct 2022 07:45)        Interval Events:  less agitated but now maybe over sedated with Fever and worsening Respiratory clearance    REVIEW OF SYSTEMS:  Lethargic    [ ] Unable to assess ROS because ________    OBJECTIVE:  ICU Vital Signs Last 24 Hrs  T(C): 36.5 (09 Oct 2022 11:09), Max: 38.8 (09 Oct 2022 05:08)  T(F): 97.7 (09 Oct 2022 11:09), Max: 101.9 (09 Oct 2022 05:08)  HR: 82 (09 Oct 2022 12:00) (82 - 113)  BP: 124/77 (09 Oct 2022 12:00) (116/72 - 164/96)  BP(mean): 86 (09 Oct 2022 12:00) (82 - 121)  ABP: --  ABP(mean): --  RR: 11 (09 Oct 2022 12:00) (11 - 31)  SpO2: 100% (09 Oct 2022 12:00) (97% - 100%)    O2 Parameters below as of 08 Oct 2022 19:00  Patient On (Oxygen Delivery Method): nasal cannula  O2 Flow (L/min): 2            10-08 @ 07:01  -  10-09 @ 07:00  --------------------------------------------------------  IN: 1287.6 mL / OUT: 1170 mL / NET: 117.6 mL    10-09 @ 07:01  -  10-09 @ 13:23  --------------------------------------------------------  IN: 550 mL / OUT: 375 mL / NET: 175 mL      CAPILLARY BLOOD GLUCOSE          PHYSICAL EXAM:  Gen: lying comfortably in bed in no apparent distress  Nose: nc CO2 monitor  Lungs: CTA  Heart: RRR  Abd: Soft/+BS/ Non-tender  Ext: No edema  Neuro: still mumbling under his breath but not doing much else and not following commands.   Psychiatry:    LINES:    HOSPITAL MEDICATIONS:  Standing Meds:  ampicillin/sulbactam  IVPB      ampicillin/sulbactam  IVPB 3 Gram(s) IV Intermittent every 6 hours  chlorhexidine 2% Cloths 1 Application(s) Topical <User Schedule>  enoxaparin Injectable 40 milliGRAM(s) SubCutaneous every 24 hours  folic acid Injectable 1 milliGRAM(s) IV Push daily  influenza   Vaccine 0.5 milliLiter(s) IntraMuscular once  lactated ringers. 1000 milliLiter(s) IV Continuous <Continuous>  multivitamin 1 Tablet(s) Oral daily  OLANZapine Disintegrating Tablet 5 milliGRAM(s) Oral at bedtime  pantoprazole  Injectable 40 milliGRAM(s) IV Push daily  thiamine 100 milliGRAM(s) Oral daily      PRN Meds:  acetaminophen     Tablet .. 650 milliGRAM(s) Oral every 6 hours PRN  ondansetron Injectable 4 milliGRAM(s) IV Push every 6 hours PRN  PHENobarbital Injectable 130 milliGRAM(s) IV Push every 6 hours PRN      LABS:                        10.5   4.31  )-----------( 291      ( 09 Oct 2022 02:00 )             34.1     Hgb Trend: 10.5<--, 10.2<--, 11.0<--, 11.1<--, 11.1<--  10-09    139  |  106  |  8   ----------------------------<  65<L>  3.5   |  20<L>  |  0.91    Ca    8.8      09 Oct 2022 02:00  Phos  2.7     10-09  Mg     1.9     10-09    TPro  7.2  /  Alb  2.7<L>  /  TBili  0.3  /  DBili  x   /  AST  14<L>  /  ALT  18  /  AlkPhos  45  10-08    Creatinine Trend: 0.91<--, 1.06<--, 0.99<--, 1.01<--, 0.91<--, 0.83<--            MICROBIOLOGY:     RADIOLOGY:  [ ] Reviewed and interpreted by me    EKG:

## 2022-10-09 NOTE — PROGRESS NOTE ADULT - ASSESSMENT
55 yo man with EtOH abuse and multiple admissions for DTs, alcoholic gastritis, multiple episodes of aspiration PNA requiring intubations. Presents 2 to abdominal pain and vomiting; admitted to F for withdrawal symptoms and also started on Abx for possible parotitis. Pt transferred to ICU for worsening agitation requiring Precedex and Phenobarb.     Neuro/Psych: Finally resolving agitation now complicated by PNA,   - Phenobabr just PRN now, Zyprexa Qhs  Resp: [facility] HOB/ Cont CO2 monitoring  ID: Now high fever with worsening cough and sputum production will start unasyn, despite frequen hospitalizations has never grown resistnat bugs.   HEME: DVT prophylaxis with Lovenox  FEN: Po diet as MS permits/ Daily Thiamine, MVI and Folate  Endo: Follow Glu  No longer on precedx but now Needs ICU for airway monitoring/frequent suctioning

## 2022-10-10 LAB
ALBUMIN SERPL ELPH-MCNC: 2.9 G/DL — LOW (ref 3.3–5)
ALP SERPL-CCNC: 45 U/L — SIGNIFICANT CHANGE UP (ref 40–120)
ALT FLD-CCNC: 17 U/L — SIGNIFICANT CHANGE UP (ref 12–78)
ANION GAP SERPL CALC-SCNC: 13 MMOL/L — SIGNIFICANT CHANGE UP (ref 5–17)
AST SERPL-CCNC: 18 U/L — SIGNIFICANT CHANGE UP (ref 15–37)
BILIRUB SERPL-MCNC: 0.3 MG/DL — SIGNIFICANT CHANGE UP (ref 0.2–1.2)
BUN SERPL-MCNC: 6 MG/DL — LOW (ref 7–23)
CALCIUM SERPL-MCNC: 8.9 MG/DL — SIGNIFICANT CHANGE UP (ref 8.5–10.1)
CHLORIDE SERPL-SCNC: 105 MMOL/L — SIGNIFICANT CHANGE UP (ref 96–108)
CO2 SERPL-SCNC: 20 MMOL/L — LOW (ref 22–31)
CREAT SERPL-MCNC: 0.8 MG/DL — SIGNIFICANT CHANGE UP (ref 0.5–1.3)
EGFR: 105 ML/MIN/1.73M2 — SIGNIFICANT CHANGE UP
GLUCOSE SERPL-MCNC: 72 MG/DL — SIGNIFICANT CHANGE UP (ref 70–99)
HCT VFR BLD CALC: 33.8 % — LOW (ref 39–50)
HGB BLD-MCNC: 10.5 G/DL — LOW (ref 13–17)
MAGNESIUM SERPL-MCNC: 2 MG/DL — SIGNIFICANT CHANGE UP (ref 1.6–2.6)
MCHC RBC-ENTMCNC: 23.6 PG — LOW (ref 27–34)
MCHC RBC-ENTMCNC: 31.1 G/DL — LOW (ref 32–36)
MCV RBC AUTO: 76.1 FL — LOW (ref 80–100)
NRBC # BLD: 0 /100 WBCS — SIGNIFICANT CHANGE UP (ref 0–0)
PHOSPHATE SERPL-MCNC: 1.8 MG/DL — LOW (ref 2.5–4.5)
PLATELET # BLD AUTO: 339 K/UL — SIGNIFICANT CHANGE UP (ref 150–400)
POTASSIUM SERPL-MCNC: 3.7 MMOL/L — SIGNIFICANT CHANGE UP (ref 3.5–5.3)
POTASSIUM SERPL-SCNC: 3.7 MMOL/L — SIGNIFICANT CHANGE UP (ref 3.5–5.3)
PROT SERPL-MCNC: 7.7 GM/DL — SIGNIFICANT CHANGE UP (ref 6–8.3)
RBC # BLD: 4.44 M/UL — SIGNIFICANT CHANGE UP (ref 4.2–5.8)
RBC # FLD: 20.3 % — HIGH (ref 10.3–14.5)
SODIUM SERPL-SCNC: 138 MMOL/L — SIGNIFICANT CHANGE UP (ref 135–145)
WBC # BLD: 3.71 K/UL — LOW (ref 3.8–10.5)
WBC # FLD AUTO: 3.71 K/UL — LOW (ref 3.8–10.5)

## 2022-10-10 PROCEDURE — 99291 CRITICAL CARE FIRST HOUR: CPT

## 2022-10-10 PROCEDURE — 73020 X-RAY EXAM OF SHOULDER: CPT | Mod: 26,RT

## 2022-10-10 PROCEDURE — 73521 X-RAY EXAM HIPS BI 2 VIEWS: CPT | Mod: 26

## 2022-10-10 RX ORDER — POTASSIUM PHOSPHATE, MONOBASIC POTASSIUM PHOSPHATE, DIBASIC 236; 224 MG/ML; MG/ML
30 INJECTION, SOLUTION INTRAVENOUS ONCE
Refills: 0 | Status: COMPLETED | OUTPATIENT
Start: 2022-10-10 | End: 2022-10-10

## 2022-10-10 RX ORDER — PHENOBARBITAL 60 MG
65 TABLET ORAL ONCE
Refills: 0 | Status: DISCONTINUED | OUTPATIENT
Start: 2022-10-10 | End: 2022-10-10

## 2022-10-10 RX ADMIN — AMPICILLIN SODIUM AND SULBACTAM SODIUM 200 GRAM(S): 250; 125 INJECTION, POWDER, FOR SUSPENSION INTRAMUSCULAR; INTRAVENOUS at 13:15

## 2022-10-10 RX ADMIN — AMPICILLIN SODIUM AND SULBACTAM SODIUM 200 GRAM(S): 250; 125 INJECTION, POWDER, FOR SUSPENSION INTRAMUSCULAR; INTRAVENOUS at 23:18

## 2022-10-10 RX ADMIN — Medication 100 MILLIGRAM(S): at 13:16

## 2022-10-10 RX ADMIN — AMPICILLIN SODIUM AND SULBACTAM SODIUM 200 GRAM(S): 250; 125 INJECTION, POWDER, FOR SUSPENSION INTRAMUSCULAR; INTRAVENOUS at 00:32

## 2022-10-10 RX ADMIN — Medication 130 MILLIGRAM(S): at 23:27

## 2022-10-10 RX ADMIN — CHLORHEXIDINE GLUCONATE 1 APPLICATION(S): 213 SOLUTION TOPICAL at 01:29

## 2022-10-10 RX ADMIN — AMPICILLIN SODIUM AND SULBACTAM SODIUM 200 GRAM(S): 250; 125 INJECTION, POWDER, FOR SUSPENSION INTRAMUSCULAR; INTRAVENOUS at 05:09

## 2022-10-10 RX ADMIN — AMPICILLIN SODIUM AND SULBACTAM SODIUM 200 GRAM(S): 250; 125 INJECTION, POWDER, FOR SUSPENSION INTRAMUSCULAR; INTRAVENOUS at 17:28

## 2022-10-10 RX ADMIN — PANTOPRAZOLE SODIUM 40 MILLIGRAM(S): 20 TABLET, DELAYED RELEASE ORAL at 13:15

## 2022-10-10 RX ADMIN — Medication 1 MILLIGRAM(S): at 14:23

## 2022-10-10 RX ADMIN — Medication 1 TABLET(S): at 13:16

## 2022-10-10 RX ADMIN — POTASSIUM PHOSPHATE, MONOBASIC POTASSIUM PHOSPHATE, DIBASIC 83.33 MILLIMOLE(S): 236; 224 INJECTION, SOLUTION INTRAVENOUS at 05:50

## 2022-10-10 RX ADMIN — SODIUM CHLORIDE 75 MILLILITER(S): 9 INJECTION, SOLUTION INTRAVENOUS at 05:10

## 2022-10-10 RX ADMIN — Medication 65 MILLIGRAM(S): at 02:24

## 2022-10-10 RX ADMIN — OLANZAPINE 5 MILLIGRAM(S): 15 TABLET, FILM COATED ORAL at 00:31

## 2022-10-10 RX ADMIN — Medication 130 MILLIGRAM(S): at 17:27

## 2022-10-10 RX ADMIN — Medication 404 MILLIGRAM(S): at 01:05

## 2022-10-10 RX ADMIN — Medication 130 MILLIGRAM(S): at 03:18

## 2022-10-10 RX ADMIN — SODIUM CHLORIDE 75 MILLILITER(S): 9 INJECTION, SOLUTION INTRAVENOUS at 17:29

## 2022-10-10 RX ADMIN — ENOXAPARIN SODIUM 40 MILLIGRAM(S): 100 INJECTION SUBCUTANEOUS at 13:15

## 2022-10-10 NOTE — CHART NOTE - NSCHARTNOTEFT_GEN_A_CORE
55 yo man with EtOH abuse and multiple admissions for DTs, alcoholic gastritis, multiple episodes of aspiration PNA requiring intubations. Presents 2 to abdominal pain and vomiting; admitted to F for withdrawal symptoms and also started on Abx for possible parotitis. Pt transferred to ICU for worsening agitation requiring Precedex and Phenobarb. Now off of Precedex for 2 days. ICU complicated course of PNA. now on unasyn. Had a fall today from bed. Denies any pain of the shoulder. Denies head injury. Currently, patient has no pain. Patient denies sob, chest pain, and abdominal pain. Patient labs were unremarkable other than electolytes which was replaced. Patient vital signs were unremarkable. (ICU Vital Signs T(C): 36.5 HR: 88 BP: 141/89 RR: 12 SpO2: 99% on RA) Patient is stable to be transferred to medicine floor and be managed by medicine team.       Neuro/Psych: Finally resolving agitation. Phenobarbital standing and Phenobabr just PRN now, Zyprexa Qhs  Resp: no acute intervention  ID: Now high fever with worsening cough and sputum production will start unasyn, despite frequen hospitalizations has never grown resistnat bugs.   MSK: had a fall. witnessed fall. no head injury was noted. FU offical xray of right sholder and hip. NO fracture noted. denies pain. able to move actively and passively.   HEME: DVT prophylaxis with Lovenox  FEN: Po diet as MS permits/ Daily Thiamine, MVI and Folate  Endo: Follow Glu 55 yo man with EtOH abuse and multiple admissions for DTs, alcoholic gastritis, multiple episodes of aspiration PNA requiring intubations. Presents 2 to abdominal pain and vomiting; admitted to F for withdrawal symptoms and also started on Abx for possible parotitis. Pt transferred to ICU for worsening agitation requiring Precedex and Phenobarb. Now off of Precedex for 2 days. ICU complicated course of PNA. now on unasyn. Had a fall today from bed. Denies any pain of the shoulder. Denies head injury. Currently, patient has no pain. Patient denies sob, chest pain, and abdominal pain. Patient labs were unremarkable other than electolytes which was replaced. Patient vital signs were unremarkable. (ICU Vital Signs T(C): 36.5 HR: 88 BP: 141/89 RR: 12 SpO2: 99% on RA) Patient is stable to be transferred to medicine floor and be managed by medicine team.       Neuro/Psych: Finally resolving agitation. Phenobarbital standing and Phenobabr just PRN now, Zyprexa Qhs  Resp: no acute intervention  ID: Now high fever with worsening cough and sputum production will start unasyn, despite frequen hospitalizations has never grown resistnat bugs.   MSK: had a fall. witnessed fall. no head injury was noted. FU offical xray of right sholder and hip. NO fracture noted. denies pain. able to move actively and passively.   HEME: DVT prophylaxis with Lovenox  FEN: Po diet as MS permits/ Daily Thiamine, MVI and Folate  Endo: Follow BS     Attending and plan discussed. Hospitalist notified and aware.

## 2022-10-10 NOTE — PROGRESS NOTE ADULT - SUBJECTIVE AND OBJECTIVE BOX
# CC: Patient is a 57y old  Male who presents with a chief complaint of alcohol gastritis, withdrawal (09 Oct 2022 13:22)      ## HPI:  Patient is a 57M with a PMH of chronic ETOH abuse with hx of alcohol withdrawal and DTs with frequent hospital admissions, alcoholic gastritis/esophagitis, PUD, HLD, hx of hypoxic respiratory failure requiring intubation due to aspiration PNA who presents to the ED for abdominal pain and vomiting.  Patient reports significant abdominal pain after drinking alcohol.  Patient unspecific about the amount of drinks per day or when his last drink was.  Noted to be withdrawn in ED.  Vitals stable, labs show lactic acidosis.  Will admit to tele.  (30 Sep 2022 05:21)      **O/N:**    ## ROS:    ## Labs:  ** CBC: **                        10.5   3.71  )-----------( 339      ( 10 Oct 2022 03:45 )             33.8     ** Chem:  **  10-10    138  |  105  |  6<L>  ----------------------------<  72  3.7   |  20<L>  |  0.80    Ca    8.9      10 Oct 2022 03:45  Phos  1.8     10-10  Mg     2.0     10-10    TPro  7.7  /  Alb  2.9<L>  /  TBili  0.3  /  DBili  x   /  AST  18  /  ALT  17  /  AlkPhos  45  10-10    ** Coags: **      CAPILLARY BLOOD GLUCOSE            Culture - Sputum (collected 09 Oct 2022 08:50)  Source: .Sputum Sputum  Gram Stain (prelim) (10 Oct 2022 07:30):    Few polymorphonuclear leukocytes per low power field    Few Squamous epithelial cells per low power field    Moderate Gram Negative Rods per oil power field    Few Gram positive cocci in pairs per oil power field  Preliminary Report (10 Oct 2022 07:30):    Normal Respiratory Melanie present    Culture - Blood (collected 09 Oct 2022 05:37)  Source: .Blood Blood  Preliminary Report (10 Oct 2022 12:02):    No growth to date.    Culture - Blood (collected 09 Oct 2022 05:37)  Source: .Blood Blood  Preliminary Report (10 Oct 2022 12:02):    No growth to date.    Culture - Blood (collected 30 Sep 2022 14:30)  Source: .Blood Blood-Peripheral  Final Report (05 Oct 2022 20:00):    No Growth Final    Culture - Blood (collected 30 Sep 2022 14:25)  Source: .Blood Blood-Peripheral  Final Report (05 Oct 2022 20:00):    No Growth Final        ## Imaging:    ## Medications:    ampicillin/sulbactam  IVPB      ampicillin/sulbactam  IVPB 3 Gram(s) IV Intermittent every 6 hours      acetaminophen     Tablet .. 650 milliGRAM(s) Oral every 6 hours PRN  OLANZapine Disintegrating Tablet 5 milliGRAM(s) Oral at bedtime  ondansetron Injectable 4 milliGRAM(s) IV Push every 6 hours PRN  PHENobarbital Injectable 130 milliGRAM(s) IV Push every 6 hours PRN      enoxaparin Injectable 40 milliGRAM(s) SubCutaneous every 24 hours    pantoprazole  Injectable 40 milliGRAM(s) IV Push daily        influenza   Vaccine 0.5 milliLiter(s) IntraMuscular once        ## O/E:  ICU Vital Signs Last 24 Hrs  T(C): 36.6 (10 Oct 2022 15:31), Max: 37.3 (10 Oct 2022 04:48)  T(F): 97.8 (10 Oct 2022 15:31), Max: 99.1 (10 Oct 2022 04:48)  HR: 119 (10 Oct 2022 18:00) (82 - 119)  BP: 133/77 (10 Oct 2022 18:00) (108/69 - 170/88)  BP(mean): 91 (10 Oct 2022 18:00) (73 - 119)  ABP: --  ABP(mean): --  RR: 17 (10 Oct 2022 18:00) (7 - 31)  SpO2: 99% (10 Oct 2022 18:00) (97% - 100%)    O2 Parameters below as of 10 Oct 2022 01:00  Patient On (Oxygen Delivery Method): room air          I&O's Summary    09 Oct 2022 07:01  -  10 Oct 2022 07:00  --------------------------------------------------------  IN: 2600 mL / OUT: 1785 mL / NET: 815 mL    10 Oct 2022 07:01  -  10 Oct 2022 19:41  --------------------------------------------------------  IN: 1580 mL / OUT: 1260 mL / NET: 320 mL        Gen: lying comfortably in bed in no apparent distress  HEENT: PERRL, EOMI  Resp: CTA B/L no c/r/w  CVS: S1S2 no m/r/g  Abd: soft NT/ND +BS  Ext: no c/c/e  Neuro: A&Ox3    ## Code status:  Goals of care discussion: [x] yes [ ] no  [x] full code  [ ] DNR  [ ] DNI  [ ] SYEDA # CC: Patient is a 57y old  Male who presents with a chief complaint of alcohol gastritis, withdrawal (09 Oct 2022 13:22)    ## HPI:  Patient is a 57M with a PMH of chronic ETOH abuse with hx of alcohol withdrawal and DTs with frequent hospital admissions, alcoholic gastritis/esophagitis, PUD, HLD, hx of hypoxic respiratory failure requiring intubation due to aspiration PNA who presents to the ED for abdominal pain and vomiting.  Patient reports significant abdominal pain after drinking alcohol.  Patient unspecific about the amount of drinks per day or when his last drink was.  Noted to be withdrawn in ED.  Vitals stable, labs show lactic acidosis.  Will admit to tele.  (30 Sep 2022 05:21)    **O/N:**  Given phenobarb doses overnight    ## ROS:  Unobtainable due to mental status    ## Labs:  ** CBC: **                        10.5   3.71  )-----------( 339      ( 10 Oct 2022 03:45 )             33.8     ** Chem:  **  10-10    138  |  105  |  6<L>  ----------------------------<  72  3.7   |  20<L>  |  0.80    Ca    8.9      10 Oct 2022 03:45  Phos  1.8     10-10  Mg     2.0     10-10    TPro  7.7  /  Alb  2.9<L>  /  TBili  0.3  /  DBili  x   /  AST  18  /  ALT  17  /  AlkPhos  45  10-10    ** Coags: **      CAPILLARY BLOOD GLUCOSE            Culture - Sputum (collected 09 Oct 2022 08:50)  Source: .Sputum Sputum  Gram Stain (prelim) (10 Oct 2022 07:30):    Few polymorphonuclear leukocytes per low power field    Few Squamous epithelial cells per low power field    Moderate Gram Negative Rods per oil power field    Few Gram positive cocci in pairs per oil power field  Preliminary Report (10 Oct 2022 07:30):    Normal Respiratory Melanie present    Culture - Blood (collected 09 Oct 2022 05:37)  Source: .Blood Blood  Preliminary Report (10 Oct 2022 12:02):    No growth to date.    Culture - Blood (collected 09 Oct 2022 05:37)  Source: .Blood Blood  Preliminary Report (10 Oct 2022 12:02):    No growth to date.    Culture - Blood (collected 30 Sep 2022 14:30)  Source: .Blood Blood-Peripheral  Final Report (05 Oct 2022 20:00):    No Growth Final    Culture - Blood (collected 30 Sep 2022 14:25)  Source: .Blood Blood-Peripheral  Final Report (05 Oct 2022 20:00):    No Growth Final        ## Imaging:    ## Medications:  ampicillin/sulbactam  IVPB 3 Gram(s) IV Intermittent every 6 hours  acetaminophen     Tablet .. 650 milliGRAM(s) Oral every 6 hours PRN  OLANZapine Disintegrating Tablet 5 milliGRAM(s) Oral at bedtime  ondansetron Injectable 4 milliGRAM(s) IV Push every 6 hours PRN  PHENobarbital Injectable 130 milliGRAM(s) IV Push every 6 hours PRN  enoxaparin Injectable 40 milliGRAM(s) SubCutaneous every 24 hours  pantoprazole  Injectable 40 milliGRAM(s) IV Push daily    ## O/E:  T(C): 36.6 (10 Oct 2022 15:31), Max: 37.3 (10 Oct 2022 04:48)  HR: 119 (10 Oct 2022 18:00) (82 - 119)  BP: 133/77 (10 Oct 2022 18:00) (108/69 - 170/88)  BP(mean): 91 (10 Oct 2022 18:00) (73 - 119)  RR: 17 (10 Oct 2022 18:00) (7 - 31)  SpO2: 99% (10 Oct 2022 18:00) (97% - 100%)    O2 Parameters below as of 10 Oct 2022 01:00 Patient On (Oxygen Delivery Method): room air  IN: 2600 mL / OUT: 1785 mL / NET: 815 mL    Gen: lying comfortably in bed in no apparent distress  HEENT: PERRL  Resp: CTA B/L no c/r/w  CVS: S1S2 no m/r/g  Abd: soft NT/ND +BS  Ext: no c/c/e  Neuro: awake responsive    ## Code status:  Goals of care discussion: [x] yes [ ] no  [x] full code  [ ] DNR  [ ] DNI  [ ] MOLST

## 2022-10-10 NOTE — PROGRESS NOTE ADULT - ASSESSMENT
57M etOH dependence p/w abdominal pain / vomiting c/b etOH withdrawal  - fell today before rounds, no head trauma no LOC. Skeletal survey done, negative  - lethargic but protecting airway  - c/w phenobarb PRN  - c/w olanzapine QHS  - on PO diet    Hemodynamically stable  Transfer to general medicine

## 2022-10-11 LAB
ALBUMIN SERPL ELPH-MCNC: 2.9 G/DL — LOW (ref 3.3–5)
ALP SERPL-CCNC: 43 U/L — SIGNIFICANT CHANGE UP (ref 40–120)
ALT FLD-CCNC: 17 U/L — SIGNIFICANT CHANGE UP (ref 12–78)
ANION GAP SERPL CALC-SCNC: 9 MMOL/L — SIGNIFICANT CHANGE UP (ref 5–17)
AST SERPL-CCNC: 16 U/L — SIGNIFICANT CHANGE UP (ref 15–37)
BILIRUB SERPL-MCNC: 0.2 MG/DL — SIGNIFICANT CHANGE UP (ref 0.2–1.2)
BUN SERPL-MCNC: 5 MG/DL — LOW (ref 7–23)
CALCIUM SERPL-MCNC: 9.2 MG/DL — SIGNIFICANT CHANGE UP (ref 8.5–10.1)
CHLORIDE SERPL-SCNC: 104 MMOL/L — SIGNIFICANT CHANGE UP (ref 96–108)
CO2 SERPL-SCNC: 26 MMOL/L — SIGNIFICANT CHANGE UP (ref 22–31)
CREAT SERPL-MCNC: 0.81 MG/DL — SIGNIFICANT CHANGE UP (ref 0.5–1.3)
EGFR: 104 ML/MIN/1.73M2 — SIGNIFICANT CHANGE UP
GLUCOSE SERPL-MCNC: 86 MG/DL — SIGNIFICANT CHANGE UP (ref 70–99)
HCT VFR BLD CALC: 36.1 % — LOW (ref 39–50)
HGB BLD-MCNC: 11 G/DL — LOW (ref 13–17)
MAGNESIUM SERPL-MCNC: 2 MG/DL — SIGNIFICANT CHANGE UP (ref 1.6–2.6)
MCHC RBC-ENTMCNC: 23.5 PG — LOW (ref 27–34)
MCHC RBC-ENTMCNC: 30.5 G/DL — LOW (ref 32–36)
MCV RBC AUTO: 77.1 FL — LOW (ref 80–100)
NRBC # BLD: 0 /100 WBCS — SIGNIFICANT CHANGE UP (ref 0–0)
PHOSPHATE SERPL-MCNC: 2.5 MG/DL — SIGNIFICANT CHANGE UP (ref 2.5–4.5)
PLATELET # BLD AUTO: 348 K/UL — SIGNIFICANT CHANGE UP (ref 150–400)
POTASSIUM SERPL-MCNC: 3.6 MMOL/L — SIGNIFICANT CHANGE UP (ref 3.5–5.3)
POTASSIUM SERPL-SCNC: 3.6 MMOL/L — SIGNIFICANT CHANGE UP (ref 3.5–5.3)
PROT SERPL-MCNC: 7.8 GM/DL — SIGNIFICANT CHANGE UP (ref 6–8.3)
RBC # BLD: 4.68 M/UL — SIGNIFICANT CHANGE UP (ref 4.2–5.8)
RBC # FLD: 20.3 % — HIGH (ref 10.3–14.5)
SODIUM SERPL-SCNC: 139 MMOL/L — SIGNIFICANT CHANGE UP (ref 135–145)
WBC # BLD: 3.31 K/UL — LOW (ref 3.8–10.5)
WBC # FLD AUTO: 3.31 K/UL — LOW (ref 3.8–10.5)

## 2022-10-11 PROCEDURE — 99232 SBSQ HOSP IP/OBS MODERATE 35: CPT

## 2022-10-11 RX ORDER — DIPHENHYDRAMINE HCL 50 MG
50 CAPSULE ORAL ONCE
Refills: 0 | Status: COMPLETED | OUTPATIENT
Start: 2022-10-11 | End: 2022-10-11

## 2022-10-11 RX ADMIN — Medication 50 MILLIGRAM(S): at 04:11

## 2022-10-11 RX ADMIN — AMPICILLIN SODIUM AND SULBACTAM SODIUM 200 GRAM(S): 250; 125 INJECTION, POWDER, FOR SUSPENSION INTRAMUSCULAR; INTRAVENOUS at 18:32

## 2022-10-11 RX ADMIN — AMPICILLIN SODIUM AND SULBACTAM SODIUM 200 GRAM(S): 250; 125 INJECTION, POWDER, FOR SUSPENSION INTRAMUSCULAR; INTRAVENOUS at 23:59

## 2022-10-11 RX ADMIN — Medication 1 TABLET(S): at 13:24

## 2022-10-11 RX ADMIN — AMPICILLIN SODIUM AND SULBACTAM SODIUM 200 GRAM(S): 250; 125 INJECTION, POWDER, FOR SUSPENSION INTRAMUSCULAR; INTRAVENOUS at 13:24

## 2022-10-11 RX ADMIN — Medication 130 MILLIGRAM(S): at 13:27

## 2022-10-11 RX ADMIN — AMPICILLIN SODIUM AND SULBACTAM SODIUM 200 GRAM(S): 250; 125 INJECTION, POWDER, FOR SUSPENSION INTRAMUSCULAR; INTRAVENOUS at 06:52

## 2022-10-11 RX ADMIN — CHLORHEXIDINE GLUCONATE 1 APPLICATION(S): 213 SOLUTION TOPICAL at 06:52

## 2022-10-11 RX ADMIN — SODIUM CHLORIDE 75 MILLILITER(S): 9 INJECTION, SOLUTION INTRAVENOUS at 23:59

## 2022-10-11 RX ADMIN — PANTOPRAZOLE SODIUM 40 MILLIGRAM(S): 20 TABLET, DELAYED RELEASE ORAL at 13:24

## 2022-10-11 RX ADMIN — Medication 100 MILLIGRAM(S): at 13:23

## 2022-10-11 RX ADMIN — Medication 1 MILLIGRAM(S): at 13:26

## 2022-10-11 RX ADMIN — Medication 2 MILLIGRAM(S): at 04:12

## 2022-10-11 RX ADMIN — SODIUM CHLORIDE 75 MILLILITER(S): 9 INJECTION, SOLUTION INTRAVENOUS at 09:58

## 2022-10-11 RX ADMIN — OLANZAPINE 5 MILLIGRAM(S): 15 TABLET, FILM COATED ORAL at 21:55

## 2022-10-11 RX ADMIN — ENOXAPARIN SODIUM 40 MILLIGRAM(S): 100 INJECTION SUBCUTANEOUS at 13:25

## 2022-10-11 RX ADMIN — Medication 130 MILLIGRAM(S): at 19:36

## 2022-10-11 NOTE — CHART NOTE - NSCHARTNOTEFT_GEN_A_CORE
Per chart pt with PMH EtOH use, multiple admissions for withdrawal, alcoholic gastritis/esophagitis, PUD, HLD, hx of hypoxic respiratory failure requiring intubation due to aspiration PNA, presents to the ED for abdominal pain and vomiting due to alcohol consumption, found with alcoholic gastritis, encephalopathy, lactic acidosis and withdrawal. Admitted to ICU for agitation. Downgraded to medical floors 10/10.     Factors impacting intake: [ ] none [ ] nausea  [ ] vomiting [ ] diarrhea [ ] constipation  [ ]chewing problems [ ] swallowing issues  [x] other: pt lethargic and sleeping most of day per PCA    Diet Prescription: Diet, Pureed:   Low Sodium  Supplement Feeding Modality:  Oral  Ensure Enlive Cans or Servings Per Day:  1       Frequency:  Three Times a day (10-10-22 @ 14:56)    Intake: PCA reports pt with poor PO intake; flow sheets indicate poor PO intake (0-25% of documented meals)    Current Weight: (10/10) 79.9kg (10/01) 82.6kg indicates weight loss of 2.7kg  % Weight Change: 3% weight loss x 9 days    No noted edema as per flow sheets.     Unable to conduct nutrition focused physical exam as pt asleep at time of visit    Pertinent Medications: MEDICATIONS  (STANDING):  ampicillin/sulbactam  IVPB      ampicillin/sulbactam  IVPB 3 Gram(s) IV Intermittent every 6 hours  chlorhexidine 2% Cloths 1 Application(s) Topical <User Schedule>  enoxaparin Injectable 40 milliGRAM(s) SubCutaneous every 24 hours  folic acid Injectable 1 milliGRAM(s) IV Push daily  influenza   Vaccine 0.5 milliLiter(s) IntraMuscular once  lactated ringers. 1000 milliLiter(s) (75 mL/Hr) IV Continuous <Continuous>  multivitamin 1 Tablet(s) Oral daily  OLANZapine Disintegrating Tablet 5 milliGRAM(s) Oral at bedtime  pantoprazole  Injectable 40 milliGRAM(s) IV Push daily  thiamine 100 milliGRAM(s) Oral daily    MEDICATIONS  (PRN):  acetaminophen     Tablet .. 650 milliGRAM(s) Oral every 6 hours PRN Temp greater or equal to 38C (100.4F), Mild Pain (1 - 3)  ondansetron Injectable 4 milliGRAM(s) IV Push every 6 hours PRN Nausea and/or Vomiting  PHENobarbital Injectable 130 milliGRAM(s) IV Push every 6 hours PRN For agitation    Pertinent Labs: 10-11 Na139 mmol/L Glu 86 mg/dL K+ 3.6 mmol/L Cr  0.81 mg/dL BUN 5 mg/dL<L> 10-11 Phos 2.5 mg/dL 10-11 Alb 2.9 g/dL<L>      Skin: no pressure injuries as per flow sheets    Estimated Needs:   [x] no change since previous assessment: 10/04/22  [ ] recalculated:     Previous Nutrition Diagnosis:   [x] Inadequate Energy Intake    Etiology	alcohol withdrawal    Signs/Symptoms	<50% nutrition needs x 4 days    Goal/Expected Outcome	pt to consume >50-75% of meals & supplement (not met)      Nutrition Diagnosis is [x] ongoing  [ ] resolved [ ] not applicable     New Nutrition Diagnosis: [x] not applicable      Interventions:   Recommend  [x] Continue current diet as ordered  [ ] Change Diet To:  [ ] Nutrition Supplement  [ ] Nutrition Support  [x] Other: Continue to provide assistance and encouragement with PO intake     Monitoring and Evaluation:   [x] PO intake [ x ] Tolerance to diet prescription [ x ] weights [ x ] labs[ x ] follow up per protocol  [ ] other:

## 2022-10-11 NOTE — PROGRESS NOTE ADULT - ASSESSMENT
57 yo man with EtOH abuse and multiple admissions for DTs, alcoholic gastritis, multiple episodes of aspiration PNA requiring intubations. Presents 2 to abdominal pain and vomiting; admitted to F for withdrawal symptoms and also started on Abx for possible parotitis. Pt transferred to ICU for worsening agitation requiring Precedex and Phenobarb. Now off of Precedex for 2 days. ICU complicated course of PNA. now on unasyn. Had a fall today from bed. Denies any pain of the shoulder. Denies head injury. Currently, patient has no pain. Patient denies sob, chest pain, and abdominal pain. Patient labs were unremarkable other than electolytes which was replaced. Patient vital signs were unremarkable. (ICU Vital Signs T(C): 36.5 HR: 88 BP: 141/89 RR: 12 SpO2: 99% on RA) Patient is stable to be transferred to medicine floor and be managed by medicine team.       Neuro/Psych: Finally resolving agitation. Phenobarbital just PRN now, Zyprexa Qhs  Pneumonia with acute hy56 yo man with EtOH abuse and multiple admissions for DTs, alcoholic gastritis, multiple episodes of aspiration PNA requiring intubations. Presents 2 to abdominal pain and vomiting; admitted to F for withdrawal symptoms and also started on Abx for possible parotitis. Pt transferred to ICU for worsening agitation requiring Precedex and Phenobarb. Now off of Precedex for 2 days. ICU complicated course of PNA. now on unasyn. Had a fall today from bed. Denies any pain of the shoulder. Denies head injury. Currently, patient has no pain. Patient denies sob, chest pain, and abdominal pain. Patient labs were unremarkable other than electolytes which was replaced. Patient vital signs were unremarkable. (ICU Vital Signs T(C): 36.5 HR: 88 BP: 141/89 RR: 12 SpO2: 99% on RA) Patient is stable to be transferred to medicine floor and be managed by medicine team.       etoh with with metabolic enceph  -  Finally resolving agitation. Phenobabr just PRN now, Zyprexa Qhs    Pna with acute hypoxic resp failure   -c/w unasyn   - despite frequent hospitalizations has never grown resistant bugs.     . witnessed fall.  -  no head injury was noted. neg  xray of right sholder and hip.     : DVT prophylaxis with Lovenox     Po diet as MS permits/ Daily Thiamine, MVI and Folate  Endo: Follow BS

## 2022-10-11 NOTE — PROGRESS NOTE ADULT - SUBJECTIVE AND OBJECTIVE BOX
Patient is a 57y old  Male who presents with a chief complaint of alcohol gastritis, withdrawal (10 Oct 2022 13:41)      INTERVAL HPI/OVERNIGHT EVENTS: none     MEDICATIONS  (STANDING):  ampicillin/sulbactam  IVPB      ampicillin/sulbactam  IVPB 3 Gram(s) IV Intermittent every 6 hours  chlorhexidine 2% Cloths 1 Application(s) Topical <User Schedule>  enoxaparin Injectable 40 milliGRAM(s) SubCutaneous every 24 hours  folic acid Injectable 1 milliGRAM(s) IV Push daily  influenza   Vaccine 0.5 milliLiter(s) IntraMuscular once  lactated ringers. 1000 milliLiter(s) (75 mL/Hr) IV Continuous <Continuous>  multivitamin 1 Tablet(s) Oral daily  OLANZapine Disintegrating Tablet 5 milliGRAM(s) Oral at bedtime  pantoprazole  Injectable 40 milliGRAM(s) IV Push daily  thiamine 100 milliGRAM(s) Oral daily    MEDICATIONS  (PRN):  acetaminophen     Tablet .. 650 milliGRAM(s) Oral every 6 hours PRN Temp greater or equal to 38C (100.4F), Mild Pain (1 - 3)  ondansetron Injectable 4 milliGRAM(s) IV Push every 6 hours PRN Nausea and/or Vomiting  PHENobarbital Injectable 130 milliGRAM(s) IV Push every 6 hours PRN For agitation      Allergies    No Known Allergies    Intolerances        REVIEW OF SYSTEMS:  CONSTITUTIONAL: No fever, weight loss  EYES: No eye pain, visual disturbances, or discharge  ENMT:  No difficulty hearing, tinnitus, vertigo; No sinus or throat pain  RESPIRATORY: No cough, wheezing, chills or hemoptysis; No shortness of breath  CARDIOVASCULAR: No chest pain, palpitations, dizziness, or leg swelling  GASTROINTESTINAL: No abdominal or epigastric pain. No nausea, vomiting, or hematemesis; No diarrhea or constipation. No melena or hematochezia.  GENITOURINARY: No dysuria, frequency, hematuria, or incontinence  NEUROLOGICAL: No headaches, memory loss, loss of strength, numbness, or tremors  SKIN: No itching, burning, rashes, or lesions   MUSCULOSKELETAL: No joint pain or swelling; No muscle, back, or extremity pain  PSYCHIATRIC: No depression, anxiety, mood swings, or difficulty sleeping  HEME/LYMPH: No easy bruising, or bleeding gums      Vital Signs Last 24 Hrs  T(C): 36.8 (11 Oct 2022 06:34), Max: 37.4 (11 Oct 2022 01:06)  T(F): 98.3 (11 Oct 2022 06:34), Max: 99.4 (11 Oct 2022 01:06)  HR: 84 (11 Oct 2022 06:34) (82 - 119)  BP: 148/98 (11 Oct 2022 06:34) (124/89 - 167/93)  BP(mean): 107 (11 Oct 2022 00:00) (91 - 107)  RR: 18 (11 Oct 2022 06:34) (13 - 20)  SpO2: 94% (11 Oct 2022 06:34) (93% - 100%)    Parameters below as of 11 Oct 2022 03:58  Patient On (Oxygen Delivery Method): room air            PHYSICAL EXAM:  GENERAL: NAD  HEAD:  Atraumatic, Normocephalic  EYES: EOMI, PERRLA, conjunctiva and sclera clear  ENMT: No tonsillar erythema, exudates, or enlargement;   NECK: Supple, Normal thyroid  NERVOUS SYSTEM:  Alert & Oriented X3, Good concentration; Motor Strength 5/5 B/L upper and lower extremities; DTRs 2+ intact and symmetric  CHEST/LUNG: CTABL; No rales, rhonchi, wheezing, or rubs  HEART: Regular rate and rhythm; No murmurs, rubs, or gallops  ABDOMEN: Soft, Nontender, Nondistended; Bowel sounds present  EXTREMITIES:  2+ Peripheral Pulses, No clubbing, cyanosis, or edema  LYMPH: No lymphadenopathy noted  SKIN: No rashes or lesions    LABS:                                   11.0   3.31  )-----------( 348      ( 11 Oct 2022 07:10 )             36.1     10-11    139  |  104  |  5<L>  ----------------------------<  86  3.6   |  26  |  0.81    Ca    9.2      11 Oct 2022 07:10  Phos  2.5     10-11  Mg     2.0     10-11    TPro  7.8  /  Alb  2.9<L>  /  TBili  0.2  /  DBili  x   /  AST  16  /  ALT  17  /  AlkPhos  43  10-11              CAPILLARY BLOOD GLUCOSE          RADIOLOGY & ADDITIONAL TESTS:    Imaging Personally Reviewed:  [ ] YES  [ ] NO    Consultant(s) Notes Reviewed:  [ ] YES  [ ] NO    Care Discussed with Consultants/Other Providers [ ] YES  [ ] NO

## 2022-10-12 DIAGNOSIS — F10.10 ALCOHOL ABUSE, UNCOMPLICATED: ICD-10-CM

## 2022-10-12 LAB
CULTURE RESULTS: SIGNIFICANT CHANGE UP
GRAM STN FLD: SIGNIFICANT CHANGE UP
SPECIMEN SOURCE: SIGNIFICANT CHANGE UP

## 2022-10-12 PROCEDURE — 90792 PSYCH DIAG EVAL W/MED SRVCS: CPT

## 2022-10-12 PROCEDURE — 99232 SBSQ HOSP IP/OBS MODERATE 35: CPT

## 2022-10-12 RX ADMIN — AMPICILLIN SODIUM AND SULBACTAM SODIUM 200 GRAM(S): 250; 125 INJECTION, POWDER, FOR SUSPENSION INTRAMUSCULAR; INTRAVENOUS at 23:30

## 2022-10-12 RX ADMIN — Medication 1 TABLET(S): at 13:32

## 2022-10-12 RX ADMIN — PANTOPRAZOLE SODIUM 40 MILLIGRAM(S): 20 TABLET, DELAYED RELEASE ORAL at 13:32

## 2022-10-12 RX ADMIN — AMPICILLIN SODIUM AND SULBACTAM SODIUM 200 GRAM(S): 250; 125 INJECTION, POWDER, FOR SUSPENSION INTRAMUSCULAR; INTRAVENOUS at 05:52

## 2022-10-12 RX ADMIN — AMPICILLIN SODIUM AND SULBACTAM SODIUM 200 GRAM(S): 250; 125 INJECTION, POWDER, FOR SUSPENSION INTRAMUSCULAR; INTRAVENOUS at 13:32

## 2022-10-12 RX ADMIN — Medication 1 MILLIGRAM(S): at 15:32

## 2022-10-12 RX ADMIN — ENOXAPARIN SODIUM 40 MILLIGRAM(S): 100 INJECTION SUBCUTANEOUS at 13:33

## 2022-10-12 RX ADMIN — AMPICILLIN SODIUM AND SULBACTAM SODIUM 200 GRAM(S): 250; 125 INJECTION, POWDER, FOR SUSPENSION INTRAMUSCULAR; INTRAVENOUS at 17:15

## 2022-10-12 RX ADMIN — OLANZAPINE 5 MILLIGRAM(S): 15 TABLET, FILM COATED ORAL at 21:41

## 2022-10-12 RX ADMIN — Medication 100 MILLIGRAM(S): at 13:33

## 2022-10-12 RX ADMIN — Medication 130 MILLIGRAM(S): at 01:52

## 2022-10-12 NOTE — BH CONSULTATION LIAISON ASSESSMENT NOTE - CURRENT MEDICATION
MEDICATIONS  (STANDING):  ampicillin/sulbactam  IVPB      ampicillin/sulbactam  IVPB 3 Gram(s) IV Intermittent every 6 hours  chlorhexidine 2% Cloths 1 Application(s) Topical <User Schedule>  enoxaparin Injectable 40 milliGRAM(s) SubCutaneous every 24 hours  folic acid Injectable 1 milliGRAM(s) IV Push daily  influenza   Vaccine 0.5 milliLiter(s) IntraMuscular once  lactated ringers. 1000 milliLiter(s) (75 mL/Hr) IV Continuous <Continuous>  multivitamin 1 Tablet(s) Oral daily  OLANZapine Disintegrating Tablet 5 milliGRAM(s) Oral at bedtime  pantoprazole  Injectable 40 milliGRAM(s) IV Push daily  thiamine 100 milliGRAM(s) Oral daily    MEDICATIONS  (PRN):  acetaminophen     Tablet .. 650 milliGRAM(s) Oral every 6 hours PRN Temp greater or equal to 38C (100.4F), Mild Pain (1 - 3)  ondansetron Injectable 4 milliGRAM(s) IV Push every 6 hours PRN Nausea and/or Vomiting  PHENobarbital Injectable 130 milliGRAM(s) IV Push every 6 hours PRN For agitation

## 2022-10-12 NOTE — PROGRESS NOTE ADULT - ASSESSMENT
55 yo man with EtOH abuse and multiple admissions for DTs, alcoholic gastritis, multiple episodes of aspiration PNA requiring intubations. Presents 2 to abdominal pain and vomiting; admitted to F for withdrawal symptoms and also started on Abx for possible parotitis. Pt transferred to ICU for worsening agitation requiring Precedex and Phenobarb. Now off of Precedex for 2 days. ICU complicated course of PNA. now on unasyn. Had a fall today from bed. Denies any pain of the shoulder. Denies head injury. Currently, patient has no pain. Patient denies sob, chest pain, and abdominal pain. Patient labs were unremarkable other than electolytes which was replaced. Patient vital signs were unremarkable. (ICU Vital Signs T(C): 36.5 HR: 88 BP: 141/89 RR: 12 SpO2: 99% on RA) Patient is stable to be transferred to medicine floor and be managed by medicine team.     etoh with with metabolic enceph  -   resolving agitation. Phenobabr just PRN now, Zyprexa Qhs    Pna with acute hypoxic resp failure   -c/w unasyn .     . witnessed fall while in ICU  -  no head injury was noted. neg  xray of right shoulder and hip.     : DVT prophylaxis with Lovenox     Po diet as MS permits/ Daily Thiamine, MVI and Folate  Endo: Follow BS

## 2022-10-12 NOTE — BH CONSULTATION LIAISON ASSESSMENT NOTE - NSBHCHARTREVIEWLAB_PSY_A_CORE FT
10-11    139  |  104  |  5<L>  ----------------------------<  86  3.6   |  26  |  0.81    Ca    9.2      11 Oct 2022 07:10  Phos  2.5     10-11  Mg     2.0     10-11    TPro  7.8  /  Alb  2.9<L>  /  TBili  0.2  /  DBili  x   /  AST  16  /  ALT  17  /  AlkPhos  43  10-11

## 2022-10-12 NOTE — BH CONSULTATION LIAISON ASSESSMENT NOTE - HPI (INCLUDE ILLNESS QUALITY, SEVERITY, DURATION, TIMING, CONTEXT, MODIFYING FACTORS, ASSOCIATED SIGNS AND SYMPTOMS)
PATIENT KNOWN TO WRITER FROM PRIOR ADMISSIONS: 56 yo [place]   male, lives with his son in a private home, works as a , with long history of continuous Alcohol Abuse spanning decades (at most reports 2-3 bottles of vodka 1-2/day), with associated numerous ED visits and hospital admissions (ie. epigastric pain, vomiting, nausea, hematemesis, aspiration pneumonia, esophageal ulcer, UGI bleed, MSSA aspiration PNA, ICU admissions), has had BALs > 400 before, admitted again to  on 9/30/22. As per ED MD note "here with abd pain known to ED for ETOH intox pt is requesting pain meds today all other complaints non-specific." BAL not done, serum / UTOX not done on admission. Triage vitals BP 93/68 mm Hg, HR 93 /min. Admission diagnosis is Alcohol dependence with withdrawal. H & P "Patient reports significant abdominal pain after drinking alcohol.  Patient unspecific about the amount of drinks per day or when his last drink was.  Noted to be withdrawn in ED.  Vitals stable, labs show lactic acidosis.  Will admit to tele....CIWA currently 5, recently received ativan Will continue withdrawal protocol with Ativan 4 q6 standing Ativan 4 mg IVP PRN for acute withdrawal symptoms." Repeat vitals HR: 88 (09-30-22 @ 05:07) (88 - 100) BP: 150/77 (09-30-22 @ 05:07) (93/68 - 156/93). 10/1/22 admitted to ICU - "admitted to [facility] for withdrawal symptoms and also started on Abx for possible parotitis. Pt transferred to ICU for worsening agitation requiring Precedex and Phenobarb. Now off of Precedex for 2 days. ICU complicated course of PNA. now on Unasyn. Had a fall from bed on 10/10/22; downgraded to floors." As of 10/12/22, Patient is on day 12 of admission and still on phenobarbital 130mg IVP q6hrs PRN but no active CIWA orders. In the last 24 hours, Patient received 3 doses. Vitals - more elevation in diastolic value with systolic fluctuating 130s to low 150s which is baseline readings for Patient during stays. Has been on Zyprexa Zydis 5mg PO qhs.     EXAM: lying in bed, head elevated, in deep sleep, awakens only to firm tactile stimuli - barely able to open eyes, does not make eye contact, not able ot engage in meaningful conversation and falls back asleep. As per nursing - described "agitation" as trying to get out of bed, saying he wants to go home. + has been retaining urine including 700cc/ 1000cc output with straight cath - Pt has no hx of urinary issues; unclear if this in part may be due to Pt being kept in bed and not allowed to ambulate to bathroom resulting in intentional holding in urine to avoid self-soil.     ISTOP Reference #:[id number]   07/12/2021 07/12/2021 chlordiazepoxide 10 mg capsule  10 4 Mildred Conley E [id number] Medicaid [facility] #[id number]

## 2022-10-12 NOTE — PROGRESS NOTE ADULT - SUBJECTIVE AND OBJECTIVE BOX
Patient is a 57y old  Male who presents with a chief complaint of alcohol gastritis, withdrawal (10 Oct 2022 13:41)      INTERVAL HPI/OVERNIGHT EVENTS: none     MEDICATIONS  (STANDING):  ampicillin/sulbactam  IVPB      ampicillin/sulbactam  IVPB 3 Gram(s) IV Intermittent every 6 hours  chlorhexidine 2% Cloths 1 Application(s) Topical <User Schedule>  enoxaparin Injectable 40 milliGRAM(s) SubCutaneous every 24 hours  folic acid Injectable 1 milliGRAM(s) IV Push daily  influenza   Vaccine 0.5 milliLiter(s) IntraMuscular once  lactated ringers. 1000 milliLiter(s) (75 mL/Hr) IV Continuous <Continuous>  multivitamin 1 Tablet(s) Oral daily  OLANZapine Disintegrating Tablet 5 milliGRAM(s) Oral at bedtime  pantoprazole  Injectable 40 milliGRAM(s) IV Push daily  thiamine 100 milliGRAM(s) Oral daily    MEDICATIONS  (PRN):  acetaminophen     Tablet .. 650 milliGRAM(s) Oral every 6 hours PRN Temp greater or equal to 38C (100.4F), Mild Pain (1 - 3)  ondansetron Injectable 4 milliGRAM(s) IV Push every 6 hours PRN Nausea and/or Vomiting  PHENobarbital Injectable 130 milliGRAM(s) IV Push every 6 hours PRN For agitation    Allergies    No Known Allergies    Intolerances        Vital Signs Last 24 Hrs  T(C): 36.7 (12 Oct 2022 11:00), Max: 37.1 (11 Oct 2022 23:44)  T(F): 98 (12 Oct 2022 11:00), Max: 98.7 (11 Oct 2022 23:44)  HR: 72 (12 Oct 2022 11:00) (72 - 100)  BP: 148/90 (12 Oct 2022 11:00) (135/80 - 161/91)  BP(mean): --  RR: 18 (12 Oct 2022 11:00) (17 - 20)  SpO2: 96% (12 Oct 2022 11:00) (94% - 98%)    Parameters below as of 12 Oct 2022 11:00  Patient On (Oxygen Delivery Method): room air            PHYSICAL EXAM:  GENERAL: NAD  HEAD:  Atraumatic, Normocephalic  EYES: EOMI, PERRLA, conjunctiva and sclera clear  ENMT: No tonsillar erythema, exudates, or enlargement;   NECK: Supple,  NERVOUS SYSTEM:  Alert & Oriented X3, Good concentration; Motor Strength 5/5 B/L upper and lower extremities; DTRs 2+ intact and symmetric  CHEST/LUNG: CTABL; No rales, rhonchi, wheezing, or rubs  HEART: Regular rate and rhythm; No murmurs, rubs, or gallops  ABDOMEN: Soft, Nontender, Nondistended; Bowel sounds present    SKIN: No rashes or lesions    LABS:                                         11.0   3.31  )-----------( 348      ( 11 Oct 2022 07:10 )             36.1   10-11    139  |  104  |  5<L>  ----------------------------<  86  3.6   |  26  |  0.81    Ca    9.2      11 Oct 2022 07:10  Phos  2.5     10-11  Mg     2.0     10-11    TPro  7.8  /  Alb  2.9<L>  /  TBili  0.2  /  DBili  x   /  AST  16  /  ALT  17  /  AlkPhos  43  10-11          CAPILLARY BLOOD GLUCOSE  CAPILLARY BLOOD GLUCOSE          RADIOLOGY & ADDITIONAL TESTS:    Imaging Personally Reviewed:  [ ] YES  [ ] NO    Consultant(s) Notes Reviewed:  [ ] YES  [ ] NO    Care Discussed with Consultants/Other Providers [ ] YES  [ ] NO Patient is a 57y old  Male who presents with a chief complaint of alcohol gastritis, withdrawal (10 Oct 2022 13:41)      INTERVAL HPI/OVERNIGHT EVENTS: none     MEDICATIONS  (STANDING):  ampicillin/sulbactam  IVPB      ampicillin/sulbactam  IVPB 3 Gram(s) IV Intermittent every 6 hours  chlorhexidine 2% Cloths 1 Application(s) Topical <User Schedule>  enoxaparin Injectable 40 milliGRAM(s) SubCutaneous every 24 hours  folic acid Injectable 1 milliGRAM(s) IV Push daily  influenza   Vaccine 0.5 milliLiter(s) IntraMuscular once  lactated ringers. 1000 milliLiter(s) (75 mL/Hr) IV Continuous <Continuous>  multivitamin 1 Tablet(s) Oral daily  OLANZapine Disintegrating Tablet 5 milliGRAM(s) Oral at bedtime  pantoprazole  Injectable 40 milliGRAM(s) IV Push daily  thiamine 100 milliGRAM(s) Oral daily    MEDICATIONS  (PRN):  acetaminophen     Tablet .. 650 milliGRAM(s) Oral every 6 hours PRN Temp greater or equal to 38C (100.4F), Mild Pain (1 - 3)  ondansetron Injectable 4 milliGRAM(s) IV Push every 6 hours PRN Nausea and/or Vomiting  PHENobarbital Injectable 130 milliGRAM(s) IV Push every 6 hours PRN For agitation    Allergies    No Known Allergies    Intolerances        Vital Signs Last 24 Hrs  T(C): 36.7 (12 Oct 2022 11:00), Max: 37.1 (11 Oct 2022 23:44)  T(F): 98 (12 Oct 2022 11:00), Max: 98.7 (11 Oct 2022 23:44)  HR: 72 (12 Oct 2022 11:00) (72 - 100)  BP: 148/90 (12 Oct 2022 11:00) (135/80 - 161/91)  BP(mean): --  RR: 18 (12 Oct 2022 11:00) (17 - 20)  SpO2: 96% (12 Oct 2022 11:00) (94% - 98%)    Parameters below as of 12 Oct 2022 11:00  Patient On (Oxygen Delivery Method): room air            PHYSICAL EXAM:  GENERAL: NAD  HEAD:  Atraumatic, Normocephalic  EYES: EOMI, PERRLA, conjunctiva and sclera clear  ENMT: No tonsillar erythema, exudates, or enlargement;   NECK: Supple,  NERVOUS SYSTEM:  sleeping   CHEST/LUNG: CTABL; No rales, rhonchi, wheezing, or rubs  HEART: Regular rate and rhythm; No murmurs, rubs, or gallops  ABDOMEN: Soft, Nontender, Nondistended; Bowel sounds present    SKIN: No rashes or lesions    LABS:                                         11.0   3.31  )-----------( 348      ( 11 Oct 2022 07:10 )             36.1   10-11    139  |  104  |  5<L>  ----------------------------<  86  3.6   |  26  |  0.81    Ca    9.2      11 Oct 2022 07:10  Phos  2.5     10-11  Mg     2.0     10-11    TPro  7.8  /  Alb  2.9<L>  /  TBili  0.2  /  DBili  x   /  AST  16  /  ALT  17  /  AlkPhos  43  10-11          CAPILLARY BLOOD GLUCOSE  CAPILLARY BLOOD GLUCOSE          RADIOLOGY & ADDITIONAL TESTS:    Imaging Personally Reviewed:  [ ] YES  [ ] NO    Consultant(s) Notes Reviewed:  [ ] YES  [ ] NO    Care Discussed with Consultants/Other Providers [ ] YES  [ ] NO

## 2022-10-12 NOTE — BH CONSULTATION LIAISON ASSESSMENT NOTE - NSBHCHARTREVIEWVS_PSY_A_CORE FT
Vital Signs Last 24 Hrs  T(C): 36.7 (12 Oct 2022 11:00), Max: 37.1 (11 Oct 2022 23:44)  T(F): 98 (12 Oct 2022 11:00), Max: 98.7 (11 Oct 2022 23:44)  HR: 72 (12 Oct 2022 11:00) (72 - 100)  BP: 148/90 (12 Oct 2022 11:00) (135/80 - 161/91)  BP(mean): --  RR: 18 (12 Oct 2022 11:00) (17 - 20)  SpO2: 96% (12 Oct 2022 11:00) (94% - 98%)    Parameters below as of 12 Oct 2022 11:00  Patient On (Oxygen Delivery Method): room air

## 2022-10-12 NOTE — BH CONSULTATION LIAISON ASSESSMENT NOTE - NSBHCONSULTRECOMMENDOTHER_PSY_A_CORE FT
discontinue IVP phenobarbital; needs complete 'wash out" - continued AMS expected after 12 days of getting consistent IVP agents   - STAT phenobarb level  - hydration, supportive care  - fall risk  - cannot leave AMA discontinue IVP phenobarbital; needs complete 'wash out" - continued AMS expected after 12 days of getting consistent IVP agents   - STAT phenobarb level  - bedside US for urinary retention / amount of urine retained which can happened secondary to extended exposure/use of certain medications   - hydration, supportive care  - fall risk  - cannot leave AMA

## 2022-10-13 PROCEDURE — 99232 SBSQ HOSP IP/OBS MODERATE 35: CPT

## 2022-10-13 PROCEDURE — 99231 SBSQ HOSP IP/OBS SF/LOW 25: CPT

## 2022-10-13 RX ORDER — LANOLIN ALCOHOL/MO/W.PET/CERES
3 CREAM (GRAM) TOPICAL AT BEDTIME
Refills: 0 | Status: DISCONTINUED | OUTPATIENT
Start: 2022-10-13 | End: 2022-10-14

## 2022-10-13 RX ADMIN — Medication 100 MILLIGRAM(S): at 13:19

## 2022-10-13 RX ADMIN — AMPICILLIN SODIUM AND SULBACTAM SODIUM 200 GRAM(S): 250; 125 INJECTION, POWDER, FOR SUSPENSION INTRAMUSCULAR; INTRAVENOUS at 19:13

## 2022-10-13 RX ADMIN — ENOXAPARIN SODIUM 40 MILLIGRAM(S): 100 INJECTION SUBCUTANEOUS at 13:19

## 2022-10-13 RX ADMIN — AMPICILLIN SODIUM AND SULBACTAM SODIUM 200 GRAM(S): 250; 125 INJECTION, POWDER, FOR SUSPENSION INTRAMUSCULAR; INTRAVENOUS at 06:17

## 2022-10-13 RX ADMIN — Medication 3 MILLIGRAM(S): at 23:37

## 2022-10-13 RX ADMIN — AMPICILLIN SODIUM AND SULBACTAM SODIUM 200 GRAM(S): 250; 125 INJECTION, POWDER, FOR SUSPENSION INTRAMUSCULAR; INTRAVENOUS at 23:14

## 2022-10-13 RX ADMIN — OLANZAPINE 5 MILLIGRAM(S): 15 TABLET, FILM COATED ORAL at 21:15

## 2022-10-13 RX ADMIN — Medication 1 MILLIGRAM(S): at 13:19

## 2022-10-13 RX ADMIN — PANTOPRAZOLE SODIUM 40 MILLIGRAM(S): 20 TABLET, DELAYED RELEASE ORAL at 13:18

## 2022-10-13 RX ADMIN — AMPICILLIN SODIUM AND SULBACTAM SODIUM 200 GRAM(S): 250; 125 INJECTION, POWDER, FOR SUSPENSION INTRAMUSCULAR; INTRAVENOUS at 13:18

## 2022-10-13 RX ADMIN — Medication 1 TABLET(S): at 13:19

## 2022-10-13 RX ADMIN — CHLORHEXIDINE GLUCONATE 1 APPLICATION(S): 213 SOLUTION TOPICAL at 06:18

## 2022-10-13 NOTE — BH CONSULTATION LIAISON PROGRESS NOTE - NSBHCONSULTRECOMMENDOTHER_PSY_A_CORE FT
10/12/22: discontinue IVP phenobarbital; needs complete 'wash out" - continued AMS expected after 12 days of getting consistent IVP agents   - STAT phenobarb level  - bedside US for urinary retention / amount of urine retained which can happened secondary to extended exposure/use of certain medications   - hydration, supportive care  - fall risk  - cannot leave AMA  10/13/22: STAT phenobarb level still not drawn; + Smith in  - continue "wash out" from benzo/phenobarb  - urinary retention was likely medication induced; will recommend trial voiding tomorrow once 48 elapsed after last IVP administration / medication serum levels have reduced enough   - no capacity, cannot leave AMA

## 2022-10-13 NOTE — BH CONSULTATION LIAISON PROGRESS NOTE - MSE UNSTRUCTURED FT
Awake, alert, responds to his name, with some residual inattentiveness, disorientation which is expected. Tearful today asking about his brother, still not able to engage in a meaningful conversation. Needs redirection, re-orientation

## 2022-10-13 NOTE — PROGRESS NOTE ADULT - SUBJECTIVE AND OBJECTIVE BOX
Patient is a 57y old  Male who presents with a chief complaint of alcohol gastritis, withdrawal (10 Oct 2022 13:41)      INTERVAL HPI/OVERNIGHT EVENTS: none   MEDICATIONS  (STANDING):  ampicillin/sulbactam  IVPB 3 Gram(s) IV Intermittent every 6 hours  ampicillin/sulbactam  IVPB      chlorhexidine 2% Cloths 1 Application(s) Topical <User Schedule>  enoxaparin Injectable 40 milliGRAM(s) SubCutaneous every 24 hours  folic acid Injectable 1 milliGRAM(s) IV Push daily  influenza   Vaccine 0.5 milliLiter(s) IntraMuscular once  lactated ringers. 1000 milliLiter(s) (75 mL/Hr) IV Continuous <Continuous>  multivitamin 1 Tablet(s) Oral daily  OLANZapine Disintegrating Tablet 5 milliGRAM(s) Oral at bedtime  pantoprazole  Injectable 40 milliGRAM(s) IV Push daily  thiamine 100 milliGRAM(s) Oral daily    MEDICATIONS  (PRN):  acetaminophen     Tablet .. 650 milliGRAM(s) Oral every 6 hours PRN Temp greater or equal to 38C (100.4F), Mild Pain (1 - 3)  ondansetron Injectable 4 milliGRAM(s) IV Push every 6 hours PRN Nausea and/or Vomiting    Allergies    No Known Allergies    Intolerances    Vital Signs Last 24 Hrs  T(C): 36.8 (13 Oct 2022 10:35), Max: 37.1 (13 Oct 2022 05:14)  T(F): 98.3 (13 Oct 2022 10:35), Max: 98.7 (13 Oct 2022 05:14)  HR: 88 (13 Oct 2022 10:35) (87 - 97)  BP: 142/81 (13 Oct 2022 10:35) (111/70 - 142/81)  BP(mean): --  RR: 19 (13 Oct 2022 10:35) (17 - 19)  SpO2: 97% (13 Oct 2022 10:35) (94% - 97%)              PHYSICAL EXAM:  GENERAL: NAD  HEAD:  Atraumatic, Normocephalic  EYES: EOMI, PERRLA, conjunctiva and sclera clear  ENMT: No tonsillar erythema, exudates, or enlargement;   NECK: Supple,  NERVOUS SYSTEM:  sleeping   CHEST/LUNG: CTABL; No rales, rhonchi, wheezing, or rubs  HEART: Regular rate and rhythm; No murmurs, rubs, or gallops  ABDOMEN: Soft, Nontender, Nondistended; Bowel sounds present    SKIN: No rashes or lesions    LABS:                               CAPILLARY BLOOD GLUCOSE  CAPILLARY BLOOD GLUCOSE          RADIOLOGY & ADDITIONAL TESTS:    Imaging Personally Reviewed:  [ ] YES  [ ] NO    Consultant(s) Notes Reviewed:  [ ] YES  [ ] NO    Care Discussed with Consultants/Other Providers [ ] YES  [ ] NO

## 2022-10-13 NOTE — PROGRESS NOTE ADULT - ASSESSMENT
55 yo man with EtOH abuse and multiple admissions for DTs, alcoholic gastritis, multiple episodes of aspiration PNA requiring intubations. Presents 2 to abdominal pain and vomiting; admitted to F for withdrawal symptoms and also started on Abx for possible parotitis. Pt transferred to ICU for worsening agitation requiring Precedex and Phenobarb. Now off of Precedex for 2 days. ICU complicated course of PNA. now on unasyn. Had a fall today from bed. Denies any pain of the shoulder. Denies head injury. Currently, patient has no pain. Patient denies sob, chest pain, and abdominal pain. Patient labs were unremarkable other than electolytes which was replaced. Patient vital signs were unremarkable. (ICU Vital Signs T(C): 36.5 HR: 88 BP: 141/89 RR: 12 SpO2: 99% on RA) Patient is stable to be transferred to medicine floor and be managed by medicine team.     etoh with with metabolic enceph  -   resolving agitation. Phenobabr just PRN now, Zyprexa Qhs  10/13/2022 appreciate pysch note,  no phenobarb    Pna with acute hypoxic resp failure   -c/w unasyn .   to be discontinued on 10/14/2022    . witnessed fall while in ICU  -  no head injury was noted. neg  xray of right shoulder and hip.     : DVT prophylaxis with Lovenox     Po diet as MS permits/ Daily Thiamine, MVI and Folate  Endo: Follow BS

## 2022-10-14 DIAGNOSIS — J96.01 ACUTE RESPIRATORY FAILURE WITH HYPOXIA: ICD-10-CM

## 2022-10-14 DIAGNOSIS — G31.2 DEGENERATION OF NERVOUS SYSTEM DUE TO ALCOHOL: ICD-10-CM

## 2022-10-14 PROCEDURE — 99231 SBSQ HOSP IP/OBS SF/LOW 25: CPT

## 2022-10-14 PROCEDURE — 99232 SBSQ HOSP IP/OBS MODERATE 35: CPT

## 2022-10-14 RX ADMIN — CHLORHEXIDINE GLUCONATE 1 APPLICATION(S): 213 SOLUTION TOPICAL at 05:14

## 2022-10-14 RX ADMIN — Medication 1 TABLET(S): at 12:36

## 2022-10-14 RX ADMIN — AMPICILLIN SODIUM AND SULBACTAM SODIUM 200 GRAM(S): 250; 125 INJECTION, POWDER, FOR SUSPENSION INTRAMUSCULAR; INTRAVENOUS at 05:12

## 2022-10-14 RX ADMIN — PANTOPRAZOLE SODIUM 40 MILLIGRAM(S): 20 TABLET, DELAYED RELEASE ORAL at 12:37

## 2022-10-14 RX ADMIN — Medication 100 MILLIGRAM(S): at 12:36

## 2022-10-14 RX ADMIN — SODIUM CHLORIDE 75 MILLILITER(S): 9 INJECTION, SOLUTION INTRAVENOUS at 13:42

## 2022-10-14 RX ADMIN — ENOXAPARIN SODIUM 40 MILLIGRAM(S): 100 INJECTION SUBCUTANEOUS at 15:26

## 2022-10-14 RX ADMIN — Medication 1 MILLIGRAM(S): at 12:40

## 2022-10-14 RX ADMIN — SODIUM CHLORIDE 75 MILLILITER(S): 9 INJECTION, SOLUTION INTRAVENOUS at 05:12

## 2022-10-14 RX ADMIN — AMPICILLIN SODIUM AND SULBACTAM SODIUM 200 GRAM(S): 250; 125 INJECTION, POWDER, FOR SUSPENSION INTRAMUSCULAR; INTRAVENOUS at 13:41

## 2022-10-14 NOTE — PROGRESS NOTE ADULT - ASSESSMENT
55 yo man with EtOH abuse and multiple admissions for DTs, alcoholic gastritis, multiple episodes of aspiration PNA requiring intubations. Presents 2 to abdominal pain and vomiting; admitted to F for withdrawal symptoms and also started on Abx for possible parotitis. Pt transferred to ICU for worsening agitation requiring Precedex and Phenobarb. Now off of Precedex for 2 days. ICU complicated course of PNA. now on unasyn. Had a fall today from bed. Denies any pain of the shoulder. Denies head injury. Currently, patient has no pain. Patient denies sob, chest pain, and abdominal pain. Patient labs were unremarkable other than electolytes which was replaced. Patient vital signs were unremarkable. (ICU Vital Signs T(C): 36.5 HR: 88 BP: 141/89 RR: 12 SpO2: 99% on RA) Patient is stable to be transferred to medicine floor and be managed by medicine team.

## 2022-10-14 NOTE — PROGRESS NOTE ADULT - SUBJECTIVE AND OBJECTIVE BOX
Patient is a 57y old  Male who presents with a chief complaint of alcohol gastritis, withdrawal (13 Oct 2022 11:59)    HPI:  Patient is a 57M with a PMH of chronic ETOH abuse with hx of alcohol withdrawal and DTs with frequent hospital admissions, alcoholic gastritis/esophagitis, PUD, HLD, hx of hypoxic respiratory failure requiring intubation due to aspiration PNA who presents to the ED for abdominal pain and vomiting.  Patient reports significant abdominal pain after drinking alcohol.  Patient unspecific about the amount of drinks per day or when his last drink was.  Noted to be withdrawn in ED.  Vitals stable, labs show lactic acidosis.  Will admit to tele.  (30 Sep 2022 05:21)    SUBJECTIVE & OBJECTIVE: Pt seen and examined at bedside. He is encephalopathic but alert this am   PHYSICAL EXAM:  T(C): 36.8 (10-14-22 @ 16:39), Max: 36.8 (10-13-22 @ 23:15)  HR: 82 (10-14-22 @ 16:39) (69 - 94)  BP: 147/90 (10-14-22 @ 16:39) (130/83 - 147/90)  RR: 18 (10-14-22 @ 16:39) (18 - 18)  SpO2: 97% (10-14-22 @ 16:39) (96% - 97%) Daily     Daily I&O's Detail    13 Oct 2022 07:01  -  14 Oct 2022 07:00  --------------------------------------------------------  IN:    IV PiggyBack: 200 mL    Lactated Ringers: 750 mL    Oral Fluid: 420 mL  Total IN: 1370 mL    OUT:    Indwelling Catheter - Urethral (mL): 2700 mL  Total OUT: 2700 mL    Total NET: -1330 mL      14 Oct 2022 07:01  -  14 Oct 2022 20:32  --------------------------------------------------------  IN:    Oral Fluid: 240 mL  Total IN: 240 mL    OUT:    Voided (mL): 550 mL  Total OUT: 550 mL    Total NET: -310 mL        GENERAL: NAD, well-groomed, well-developed  HEAD:  Atraumatic, Normocephalic  EYES: EOMI, PERRLA, conjunctiva and sclera clear  ENMT: Moist mucous membranes  NECK: Supple, No JVD  NERVOUS SYSTEM:  Alert & encephalopathic, Motor Strength 5/5 B/L upper and lower extremities; DTRs 2+ intact and symmetric  CHEST/LUNG: Clear to auscultation bilaterally; No rales, rhonchi, wheezing, or rubs  HEART: Regular rate and rhythm; No murmurs, rubs, or gallops  ABDOMEN: Soft, Nontender, Nondistended; Bowel sounds present  EXTREMITIES:  2+ Peripheral Pulses, No clubbing, cyanosis, or edema  LABS:  CAPILLARY BLOOD GLUCOSE        RECENT CULTURES:   RADIOLOGY & ADDITIONAL TESTS:

## 2022-10-14 NOTE — BH CONSULTATION LIAISON PROGRESS NOTE - OTHER
has reactivity  much improved  returning to baseline  baseline limited regarding his alcohol abuse disorder  deferred  baseline low end of fair

## 2022-10-14 NOTE — PROGRESS NOTE ADULT - PROBLEM SELECTOR PLAN 1
-   resolving agitation. Phenobabr just PRN now, Zyprexa Qhs  10/13/2022 appreciate og note,  no phenobarb  10/14/22- cleared by psych for d/c when medically ready

## 2022-10-14 NOTE — BH CONSULTATION LIAISON PROGRESS NOTE - NSBHCONSULTRECOMMENDOTHER_PSY_A_CORE FT
10/12/22: discontinue IVP phenobarbital; needs complete 'wash out" - continued AMS expected after 12 days of getting consistent IVP agents   - STAT phenobarb level  - bedside US for urinary retention / amount of urine retained which can happened secondary to extended exposure/use of certain medications   - hydration, supportive care  - fall risk  - cannot leave AMA  10/13/22: STAT phenobarb level still not drawn; + Smith in  - continue "wash out" from benzo/phenobarb  - urinary retention was likely medication induced; will recommend trial voiding tomorrow once 48 elapsed after last IVP administration / medication serum levels have reduced enough   - no capacity, cannot leave AMA  10/14/22: maintaining clinical progress/improvement  - ready for PT evaluation in preparation for discharge (ordered)  - discontinue Zyprexa as it is no longer clinically needed   - start trial voidings 10/12/22: discontinue IVP phenobarbital; needs complete 'wash out" - continued AMS expected after 12 days of getting consistent IVP agents   - STAT phenobarb level  - bedside US for urinary retention / amount of urine retained which can happened secondary to extended exposure/use of certain medications   - hydration, supportive care  - fall risk  - cannot leave AMA  10/13/22: STAT phenobarb level still not drawn; + Smith in  - continue "wash out" from benzo/phenobarb  - urinary retention was likely medication induced; will recommend trial voiding tomorrow once 48 elapsed after last IVP administration / medication serum levels have reduced enough   - no capacity, cannot leave AMA  10/14/22: maintaining clinical progress/improvement  - ready for PT evaluation in preparation for discharge (ordered)  - discontinue Zyprexa as it is no longer clinically needed   - start trial voidings  - diet can be changed to regular

## 2022-10-15 PROCEDURE — 99232 SBSQ HOSP IP/OBS MODERATE 35: CPT

## 2022-10-15 RX ORDER — LACTULOSE 10 G/15ML
20 SOLUTION ORAL ONCE
Refills: 0 | Status: COMPLETED | OUTPATIENT
Start: 2022-10-15 | End: 2022-10-15

## 2022-10-15 RX ORDER — LANOLIN ALCOHOL/MO/W.PET/CERES
3 CREAM (GRAM) TOPICAL AT BEDTIME
Refills: 0 | Status: DISCONTINUED | OUTPATIENT
Start: 2022-10-15 | End: 2022-10-17

## 2022-10-15 RX ADMIN — Medication 3 MILLIGRAM(S): at 21:48

## 2022-10-15 RX ADMIN — ENOXAPARIN SODIUM 40 MILLIGRAM(S): 100 INJECTION SUBCUTANEOUS at 14:03

## 2022-10-15 RX ADMIN — LACTULOSE 20 GRAM(S): 10 SOLUTION ORAL at 11:37

## 2022-10-15 RX ADMIN — CHLORHEXIDINE GLUCONATE 1 APPLICATION(S): 213 SOLUTION TOPICAL at 06:40

## 2022-10-15 RX ADMIN — Medication 100 MILLIGRAM(S): at 11:39

## 2022-10-15 RX ADMIN — Medication 3 MILLIGRAM(S): at 01:11

## 2022-10-15 RX ADMIN — Medication 650 MILLIGRAM(S): at 01:11

## 2022-10-15 RX ADMIN — Medication 650 MILLIGRAM(S): at 02:11

## 2022-10-15 NOTE — PROGRESS NOTE ADULT - SUBJECTIVE AND OBJECTIVE BOX
Patient is a 57y old  Male who presents with a chief complaint of alcohol gastritis, withdrawal (14 Oct 2022 14:00)    HPI:  Patient is a 57M with a PMH of chronic ETOH abuse with hx of alcohol withdrawal and DTs with frequent hospital admissions, alcoholic gastritis/esophagitis, PUD, HLD, hx of hypoxic respiratory failure requiring intubation due to aspiration PNA who presents to the ED for abdominal pain and vomiting.  Patient reports significant abdominal pain after drinking alcohol.  Patient unspecific about the amount of drinks per day or when his last drink was.  Noted to be withdrawn in ED.  Vitals stable, labs show lactic acidosis.  Will admit to tele.  (30 Sep 2022 05:21)    SUBJECTIVE & OBJECTIVE: Pt seen and examined at bedside.   PHYSICAL EXAM:  T(C): 36.5 (10-15-22 @ 05:46), Max: 36.9 (10-15-22 @ 01:30)  HR: 72 (10-15-22 @ 05:46) (72 - 94)  BP: 118/78 (10-15-22 @ 05:46) (118/78 - 148/84)  RR: 17 (10-15-22 @ 05:46) (17 - 18)  SpO2: 97% (10-15-22 @ 05:46) (96% - 98%) Daily Height in cm: 170.18 (15 Oct 2022 01:30)    Daily I&O's Detail    14 Oct 2022 07:01  -  15 Oct 2022 07:00  --------------------------------------------------------  IN:    Oral Fluid: 240 mL  Total IN: 240 mL    OUT:    Voided (mL): 1700 mL  Total OUT: 1700 mL    Total NET: -1460 mL        GENERAL: NAD, well-groomed, well-developed  HEAD:  Atraumatic, Normocephalic  EYES: EOMI, PERRLA, conjunctiva and sclera clear  ENMT: Moist mucous membranes  NECK: Supple, No JVD  NERVOUS SYSTEM:  encephalopathic, Motor Strength 5/5 B/L upper and lower extremities; DTRs 2+ intact and symmetric  CHEST/LUNG: Clear to auscultation bilaterally; No rales, rhonchi, wheezing, or rubs  HEART: Regular rate and rhythm; No murmurs, rubs, or gallops  ABDOMEN: Soft, Nontender, Nondistended; Bowel sounds present  EXTREMITIES:  2+ Peripheral Pulses, No clubbing, cyanosis, or edema  LABS:  CAPILLARY BLOOD GLUCOSE        RECENT CULTURES:   RADIOLOGY & ADDITIONAL TESTS:

## 2022-10-15 NOTE — PROGRESS NOTE ADULT - ASSESSMENT
55 yo man with EtOH abuse and multiple admissions for DTs, alcoholic gastritis, multiple episodes of aspiration PNA requiring intubations. Presents 2 to abdominal pain and vomiting; admitted to F for withdrawal symptoms and also started on Abx for possible parotitis. Pt transferred to ICU for worsening agitation requiring Precedex and Phenobarb. Now off of Precedex for 2 days. ICU complicated course of PNA. now on unasyn. Had a fall today from bed. Denies any pain of the shoulder. Denies head injury. Currently, patient has no pain. Patient denies sob, chest pain, and abdominal pain. Patient labs were unremarkable other than electolytes which was replaced. Patient vital signs were unremarkable. (ICU Vital Signs T(C): 36.5 HR: 88 BP: 141/89 RR: 12 SpO2: 99% on RA) Patient is stable to be transferred to medicine floor and be managed by medicine team.     Problem/Plan - 1:  ·  Problem: Alcoholic encephalopathy.   ·  Plan: -   resolving agitation. Phenobabr just PRN now, Zyprexa Qhs  10/13/2022 appreciate mauriciosch note,  no phenobarb  10/14/22- cleared by psych for d/c when medically ready.    Problem/Plan - 2:  ·  Problem: Acute respiratory failure with hypoxia.   ·  Plan: -c/w unasyn .   to be discontinued today.

## 2022-10-15 NOTE — PHYSICAL THERAPY INITIAL EVALUATION ADULT - PATIENT PROFILE REVIEW, REHAB EVAL
I have personally seen and examined this patient. I have fully participated in the care of this patient. I have reviewed all pertinent clinical information, including history physical exam, plan and the Resident's note and agree except as noted yes

## 2022-10-15 NOTE — PHYSICAL THERAPY INITIAL EVALUATION ADULT - GAIT TRAINING, PT EVAL
Pt will ambulate 100 feet and negotiate 3 steps c one rail, w/o assist device, under supervision, without loss of balance, by 2-3days.

## 2022-10-15 NOTE — PHYSICAL THERAPY INITIAL EVALUATION ADULT - PERTINENT HX OF CURRENT PROBLEM, REHAB EVAL
Patient is a 57M with a PMH of chronic ETOH abuse with hx of alcohol withdrawal and DTs with frequent hospital admissions, alcoholic gastritis/esophagitis, PUD, HLD, hx of hypoxic respiratory failure requiring intubation due to aspiration PNA who presents to the ED for abdominal pain and vomiting.  Patient reports significant abdominal pain after drinking alcohol. Pt was admitted c Dx of ETOH dependence; Alcohol dependence with withdrawal.

## 2022-10-16 PROCEDURE — 99232 SBSQ HOSP IP/OBS MODERATE 35: CPT

## 2022-10-16 RX ADMIN — Medication 3 MILLIGRAM(S): at 21:53

## 2022-10-16 RX ADMIN — Medication 5 MILLIGRAM(S): at 23:40

## 2022-10-16 RX ADMIN — Medication 100 MILLIGRAM(S): at 12:12

## 2022-10-16 RX ADMIN — ENOXAPARIN SODIUM 40 MILLIGRAM(S): 100 INJECTION SUBCUTANEOUS at 14:12

## 2022-10-16 RX ADMIN — Medication 650 MILLIGRAM(S): at 17:58

## 2022-10-16 RX ADMIN — CHLORHEXIDINE GLUCONATE 1 APPLICATION(S): 213 SOLUTION TOPICAL at 06:06

## 2022-10-16 RX ADMIN — Medication 650 MILLIGRAM(S): at 18:58

## 2022-10-16 NOTE — PROGRESS NOTE ADULT - REASON FOR ADMISSION
alcohol gastritis, withdrawal

## 2022-10-16 NOTE — PROGRESS NOTE ADULT - SUBJECTIVE AND OBJECTIVE BOX
Patient is a 57y old  Male who presents with a chief complaint of alcohol gastritis, withdrawal (15 Oct 2022 09:02)    HPI:  Patient is a 57M with a PMH of chronic ETOH abuse with hx of alcohol withdrawal and DTs with frequent hospital admissions, alcoholic gastritis/esophagitis, PUD, HLD, hx of hypoxic respiratory failure requiring intubation due to aspiration PNA who presents to the ED for abdominal pain and vomiting.  Patient reports significant abdominal pain after drinking alcohol.  Patient unspecific about the amount of drinks per day or when his last drink was.  Noted to be withdrawn in ED.  Vitals stable, labs show lactic acidosis.  Will admit to tele.  (30 Sep 2022 05:21)    SUBJECTIVE & OBJECTIVE: Pt seen and examined at bedside.   PHYSICAL EXAM:  T(C): 36.6 (10-17-22 @ 04:45), Max: 36.9 (10-16-22 @ 11:02)  HR: 60 (10-17-22 @ 04:45) (60 - 79)  BP: 119/83 (10-17-22 @ 04:45) (112/73 - 119/83)  RR: 18 (10-17-22 @ 04:45) (18 - 18)  SpO2: 99% (10-17-22 @ 04:45) (98% - 99%) Daily     Daily I&O's Detail    15 Oct 2022 07:01  -  16 Oct 2022 07:00  --------------------------------------------------------  IN:  Total IN: 0 mL    OUT:    Voided (mL): 300 mL  Total OUT: 300 mL    Total NET: -300 mL      16 Oct 2022 07:01  -  17 Oct 2022 06:50  --------------------------------------------------------  IN:  Total IN: 0 mL    OUT:    Voided (mL): 400 mL  Total OUT: 400 mL    Total NET: -400 mL        GENERAL: NAD, disheveled  HEAD:  Atraumatic, Normocephalic  EYES: EOMI, PERRLA, conjunctiva and sclera clear  ENMT: Moist mucous membranes  NECK: Supple, No JVD  NERVOUS SYSTEM:  Alert & less confused, unsteady gait, Motor Strength 5/5 B/L upper and lower extremities; DTRs 2+ intact and symmetric  CHEST/LUNG: Clear to auscultation bilaterally; No rales, rhonchi, wheezing, or rubs  HEART: Regular rate and rhythm; No murmurs, rubs, or gallops  ABDOMEN: Soft, Nontender, Nondistended; Bowel sounds present  EXTREMITIES:  2+ Peripheral Pulses, No clubbing, cyanosis, or edema  LABS:  CAPILLARY BLOOD GLUCOSE        RECENT CULTURES:   RADIOLOGY & ADDITIONAL TESTS:

## 2022-10-16 NOTE — PROGRESS NOTE ADULT - PROVIDER SPECIALTY LIST ADULT
Critical Care
Critical Care
Hospitalist
Critical Care
Hospitalist
Hospitalist
Infectious Disease
Critical Care
Hospitalist

## 2022-10-16 NOTE — PROGRESS NOTE ADULT - ASSESSMENT
57 yo man with EtOH abuse and multiple admissions for DTs, alcoholic gastritis, multiple episodes of aspiration PNA requiring intubations. Presents 2 to abdominal pain and vomiting; admitted to F for withdrawal symptoms and also started on Abx for possible parotitis. Pt transferred to ICU for worsening agitation requiring Precedex and Phenobarb. Now off of Precedex for 2 days. ICU complicated course of PNA. now on unasyn. Had a fall today from bed. Denies any pain of the shoulder. Denies head injury. Currently, patient has no pain. Patient denies sob, chest pain, and abdominal pain. Patient labs were unremarkable other than electolytes which was replaced. Patient vital signs were unremarkable. (ICU Vital Signs T(C): 36.5 HR: 88 BP: 141/89 RR: 12 SpO2: 99% on RA) Patient is stable to be transferred to medicine floor and be managed by medicine team.     Problem/Plan - 1:  ·  Problem: Alcoholic encephalopathy.   ·  Plan: -   resolving agitation. Phenobabr just PRN now, Zyprexa Qhs  10/13/2022 appreciate og note,  no phenobarb  10/14/22- cleared by psych for d/c when medically ready.  10/16 - ready for d/c home. family cannot take him over the weekend    Problem/Plan - 2:  ·  Problem: Acute respiratory failure with hypoxia.   ·  Plan: -c/w unasyn .   to be discontinued today.

## 2022-10-17 ENCOUNTER — TRANSCRIPTION ENCOUNTER (OUTPATIENT)
Age: 57
End: 2022-10-17

## 2022-10-17 VITALS
RESPIRATION RATE: 18 BRPM | SYSTOLIC BLOOD PRESSURE: 122 MMHG | TEMPERATURE: 98 F | OXYGEN SATURATION: 98 % | HEART RATE: 87 BPM | DIASTOLIC BLOOD PRESSURE: 71 MMHG

## 2022-10-17 LAB
ALBUMIN SERPL ELPH-MCNC: 3.1 G/DL — LOW (ref 3.3–5)
ALP SERPL-CCNC: 36 U/L — LOW (ref 40–120)
ALT FLD-CCNC: 29 U/L — SIGNIFICANT CHANGE UP (ref 12–78)
ANION GAP SERPL CALC-SCNC: 7 MMOL/L — SIGNIFICANT CHANGE UP (ref 5–17)
AST SERPL-CCNC: 24 U/L — SIGNIFICANT CHANGE UP (ref 15–37)
BILIRUB SERPL-MCNC: 0.1 MG/DL — LOW (ref 0.2–1.2)
BUN SERPL-MCNC: 15 MG/DL — SIGNIFICANT CHANGE UP (ref 7–23)
CALCIUM SERPL-MCNC: 9 MG/DL — SIGNIFICANT CHANGE UP (ref 8.5–10.1)
CHLORIDE SERPL-SCNC: 106 MMOL/L — SIGNIFICANT CHANGE UP (ref 96–108)
CO2 SERPL-SCNC: 24 MMOL/L — SIGNIFICANT CHANGE UP (ref 22–31)
CREAT SERPL-MCNC: 0.98 MG/DL — SIGNIFICANT CHANGE UP (ref 0.5–1.3)
EGFR: 91 ML/MIN/1.73M2 — SIGNIFICANT CHANGE UP
GLUCOSE SERPL-MCNC: 84 MG/DL — SIGNIFICANT CHANGE UP (ref 70–99)
HCT VFR BLD CALC: 35.9 % — LOW (ref 39–50)
HGB BLD-MCNC: 10.8 G/DL — LOW (ref 13–17)
MAGNESIUM SERPL-MCNC: 2.1 MG/DL — SIGNIFICANT CHANGE UP (ref 1.6–2.6)
MCHC RBC-ENTMCNC: 23.3 PG — LOW (ref 27–34)
MCHC RBC-ENTMCNC: 30.1 G/DL — LOW (ref 32–36)
MCV RBC AUTO: 77.4 FL — LOW (ref 80–100)
NRBC # BLD: 0 /100 WBCS — SIGNIFICANT CHANGE UP (ref 0–0)
PHOSPHATE SERPL-MCNC: 2.8 MG/DL — SIGNIFICANT CHANGE UP (ref 2.5–4.5)
PLATELET # BLD AUTO: 546 K/UL — HIGH (ref 150–400)
POTASSIUM SERPL-MCNC: 3.7 MMOL/L — SIGNIFICANT CHANGE UP (ref 3.5–5.3)
POTASSIUM SERPL-SCNC: 3.7 MMOL/L — SIGNIFICANT CHANGE UP (ref 3.5–5.3)
PROT SERPL-MCNC: 7.5 GM/DL — SIGNIFICANT CHANGE UP (ref 6–8.3)
RBC # BLD: 4.64 M/UL — SIGNIFICANT CHANGE UP (ref 4.2–5.8)
RBC # FLD: 20.7 % — HIGH (ref 10.3–14.5)
SODIUM SERPL-SCNC: 137 MMOL/L — SIGNIFICANT CHANGE UP (ref 135–145)
WBC # BLD: 3.8 K/UL — SIGNIFICANT CHANGE UP (ref 3.8–10.5)
WBC # FLD AUTO: 3.8 K/UL — SIGNIFICANT CHANGE UP (ref 3.8–10.5)

## 2022-10-17 PROCEDURE — 99231 SBSQ HOSP IP/OBS SF/LOW 25: CPT

## 2022-10-17 PROCEDURE — 99239 HOSP IP/OBS DSCHRG MGMT >30: CPT

## 2022-10-17 RX ORDER — SENNA PLUS 8.6 MG/1
2 TABLET ORAL AT BEDTIME
Refills: 0 | Status: DISCONTINUED | OUTPATIENT
Start: 2022-10-17 | End: 2022-10-17

## 2022-10-17 RX ADMIN — Medication 650 MILLIGRAM(S): at 15:14

## 2022-10-17 RX ADMIN — Medication 100 MILLIGRAM(S): at 17:21

## 2022-10-17 RX ADMIN — CHLORHEXIDINE GLUCONATE 1 APPLICATION(S): 213 SOLUTION TOPICAL at 05:19

## 2022-10-17 RX ADMIN — Medication 650 MILLIGRAM(S): at 05:16

## 2022-10-17 NOTE — DISCHARGE NOTE NURSING/CASE MANAGEMENT/SOCIAL WORK - NSDCPEFALRISK_GEN_ALL_CORE
For information on Fall & Injury Prevention, visit: [email or web address] OR  [email or web address] OR  https://www.cdc.gov/steadi/patient.html
week(s)

## 2022-10-17 NOTE — DISCHARGE NOTE NURSING/CASE MANAGEMENT/SOCIAL WORK - PATIENT PORTAL LINK FT
You can access the FollowMyHealth Patient Portal offered by [facility] by registering at the following website: [email or web address]. By joining [facility]’s FollowMyHealth portal, you will also be able to view your health information using other applications (apps) compatible with our system.

## 2022-10-17 NOTE — DISCHARGE NOTE PROVIDER - NSDCMRMEDTOKEN_GEN_ALL_CORE_FT
folic acid 1 mg oral tablet: 1 tab(s) orally once a day  pantoprazole 40 mg oral delayed release tablet: 1 tab(s) orally once a day (before a meal)  sucralfate 1 g oral tablet: 1 tab(s) orally 4 times a day

## 2022-10-17 NOTE — DISCHARGE NOTE NURSING/CASE MANAGEMENT/SOCIAL WORK - NSDCVIVACCINE_GEN_ALL_CORE_FT
COVID-19, mRNA, LNP-S, PF, 100 mcg/ 0.5 mL dose (Moderna); 10-Jovan-2021 15:38; Reji Hernandez (RN); [employer] [id number] (Exp. Date: 13-Jul-2021); IntraMuscular; Deltoid Right.; 0.5 milliLiter(s);   influenza, injectable, quadrivalent, preservative free; 31-Jan-2019 15:53; Ayaka Bynum (RN); [employer]; [id number] (Exp. Date: 30-Jun-2019); IntraMuscular; Deltoid Right.; 0.5 milliLiter(s); VIS (VIS Published: 07-Aug-2015, VIS Presented: 31-Jan-2019);   influenza, injectable, quadrivalent, preservative free; 10-Nov-2019 14:13; Laverne Lane (RN); [employer]; [id number] (Exp. Date: 30-Jun-2020); IntraMuscular; Deltoid Left.; 0.5 milliLiter(s); VIS (VIS Published: 15-Aug-2019, VIS Presented: 10-Nov-2019);   influenza, injectable, quadrivalent, preservative free; 13-Oct-2020 11:56; Kimberli Cronin (RN); [employer]; [id number] (Exp. Date: 30-Jun-2021); IntraMuscular; Deltoid Right.; 0.5 milliLiter(s); VIS (VIS Published: 15-Aug-2019, VIS Presented: 13-Oct-2020);   Td (adult) preservative free; 18-Jan-2020 20:04; Yulia Vance (RN); [employer]; [id number] (Exp. Date: 19-Jan-2021); IntraMuscular; Deltoid Right.; 0.5 milliLiter(s); VIS (VIS Published: 18-Jan-2020, VIS Presented: 18-Jan-2020);

## 2022-10-17 NOTE — BH CONSULTATION LIAISON PROGRESS NOTE - NSBHCONSULTRECOMMENDOTHER_PSY_A_CORE FT
10/12/22: discontinue IVP phenobarbital; needs complete 'wash out" - continued AMS expected after 12 days of getting consistent IVP agents   - STAT phenobarb level  - bedside US for urinary retention / amount of urine retained which can happened secondary to extended exposure/use of certain medications   - hydration, supportive care  - fall risk  - cannot leave AMA  10/13/22: STAT phenobarb level still not drawn; + Smith in  - continue "wash out" from benzo/phenobarb  - urinary retention was likely medication induced; will recommend trial voiding tomorrow once 48 elapsed after last IVP administration / medication serum levels have reduced enough   - no capacity, cannot leave AMA  10/14/22: maintaining clinical progress/improvement  - ready for PT evaluation in preparation for discharge (ordered)  - discontinue Zyprexa as it is no longer clinically needed   - start trial voidings  - diet can be changed to regular   10/17/22: mental status back to baseline, gait stable. Smith out and voiding normally. + regained capacity. DC home today with family. Pt not interested in referrals to substance abuse services.   - [facility] signing off

## 2022-10-17 NOTE — BH CONSULTATION LIAISON PROGRESS NOTE - CURRENT MEDICATION
MEDICATIONS  (STANDING):  ampicillin/sulbactam  IVPB      ampicillin/sulbactam  IVPB 3 Gram(s) IV Intermittent every 6 hours  chlorhexidine 2% Cloths 1 Application(s) Topical <User Schedule>  enoxaparin Injectable 40 milliGRAM(s) SubCutaneous every 24 hours  folic acid Injectable 1 milliGRAM(s) IV Push daily  influenza   Vaccine 0.5 milliLiter(s) IntraMuscular once  lactated ringers. 1000 milliLiter(s) (75 mL/Hr) IV Continuous <Continuous>  multivitamin 1 Tablet(s) Oral daily  OLANZapine Disintegrating Tablet 5 milliGRAM(s) Oral at bedtime  pantoprazole  Injectable 40 milliGRAM(s) IV Push daily  thiamine 100 milliGRAM(s) Oral daily    MEDICATIONS  (PRN):  acetaminophen     Tablet .. 650 milliGRAM(s) Oral every 6 hours PRN Temp greater or equal to 38C (100.4F), Mild Pain (1 - 3)  ondansetron Injectable 4 milliGRAM(s) IV Push every 6 hours PRN Nausea and/or Vomiting  
MEDICATIONS  (STANDING):  chlorhexidine 2% Cloths 1 Application(s) Topical <User Schedule>  enoxaparin Injectable 40 milliGRAM(s) SubCutaneous every 24 hours  folic acid Injectable 1 milliGRAM(s) IV Push daily  influenza   Vaccine 0.5 milliLiter(s) IntraMuscular once  lactated ringers. 1000 milliLiter(s) (75 mL/Hr) IV Continuous <Continuous>  melatonin 3 milliGRAM(s) Oral at bedtime  multivitamin 1 Tablet(s) Oral daily  pantoprazole  Injectable 40 milliGRAM(s) IV Push daily  thiamine 100 milliGRAM(s) Oral daily    MEDICATIONS  (PRN):  acetaminophen     Tablet .. 650 milliGRAM(s) Oral every 6 hours PRN Temp greater or equal to 38C (100.4F), Mild Pain (1 - 3)  ondansetron Injectable 4 milliGRAM(s) IV Push every 6 hours PRN Nausea and/or Vomiting  
MEDICATIONS  (STANDING):  chlorhexidine 2% Cloths 1 Application(s) Topical <User Schedule>  enoxaparin Injectable 40 milliGRAM(s) SubCutaneous every 24 hours  influenza   Vaccine 0.5 milliLiter(s) IntraMuscular once  melatonin 3 milliGRAM(s) Oral at bedtime  thiamine 100 milliGRAM(s) Oral daily    MEDICATIONS  (PRN):  acetaminophen     Tablet .. 650 milliGRAM(s) Oral every 6 hours PRN Temp greater or equal to 38C (100.4F), Mild Pain (1 - 3)  senna 2 Tablet(s) Oral at bedtime PRN Constipation

## 2022-10-17 NOTE — BH CONSULTATION LIAISON PROGRESS NOTE - NSBHCHARTREVIEWVS_PSY_A_CORE FT
Vital Signs Last 24 Hrs  T(C): 36.8 (13 Oct 2022 10:35), Max: 37.1 (13 Oct 2022 05:14)  T(F): 98.3 (13 Oct 2022 10:35), Max: 98.7 (13 Oct 2022 05:14)  HR: 88 (13 Oct 2022 10:35) (87 - 97)  BP: 142/81 (13 Oct 2022 10:35) (111/70 - 142/81)  BP(mean): --  RR: 19 (13 Oct 2022 10:35) (17 - 19)  SpO2: 97% (13 Oct 2022 10:35) (94% - 97%)    
Vital Signs Last 24 Hrs  T(C): 36.7 (14 Oct 2022 12:00), Max: 36.9 (13 Oct 2022 17:07)  T(F): 98 (14 Oct 2022 12:00), Max: 98.5 (13 Oct 2022 17:07)  HR: 94 (14 Oct 2022 12:00) (69 - 94)  BP: 136/89 (14 Oct 2022 12:00) (130/83 - 136/92)  BP(mean): --  RR: 18 (14 Oct 2022 12:00) (18 - 18)  SpO2: 97% (14 Oct 2022 12:00) (96% - 97%)    Parameters below as of 14 Oct 2022 12:00  Patient On (Oxygen Delivery Method): room air    
Vital Signs Last 24 Hrs  T(C): 36.4 (17 Oct 2022 10:50), Max: 36.9 (16 Oct 2022 16:00)  T(F): 97.6 (17 Oct 2022 10:50), Max: 98.4 (16 Oct 2022 16:00)  HR: 82 (17 Oct 2022 10:50) (60 - 82)  BP: 115/73 (17 Oct 2022 10:50) (114/78 - 119/83)  BP(mean): --  RR: 17 (17 Oct 2022 10:50) (17 - 18)  SpO2: 98% (17 Oct 2022 10:50) (98% - 99%)    Parameters below as of 17 Oct 2022 10:50  Patient On (Oxygen Delivery Method): room air

## 2022-10-17 NOTE — BH CONSULTATION LIAISON PROGRESS NOTE - NSBHCONSFOLLOWNEEDS_PSY_ALL_CORE
No psychiatric contraindications to discharge
No psychiatric contraindications to discharge
Needs further psych safety assessment prior to discharge

## 2022-10-17 NOTE — BH CONSULTATION LIAISON PROGRESS NOTE - NSBHCONSULTFOLLOWAFTERCARE_PSY_A_CORE FT
once he clears PT eval, can return home with strong recommendation Patient get alcohol abuse referral offers even though he refuses each time 
once he clears PT eval, can return home with strong recommendation Patient get alcohol abuse referral offers even though he refuses each time

## 2022-10-17 NOTE — BH CONSULTATION LIAISON PROGRESS NOTE - NSBHATTESTBILLINGAW_PSY_A_CORE
[id number] Inpatient care - low complexity - 15 minutes
[id number]-Trkgufalcr Inpatient care - low complexity - 15 minutes
[id number]-Wraierrmls Inpatient care - low complexity - 15 minutes

## 2022-10-17 NOTE — BH CONSULTATION LIAISON PROGRESS NOTE - NSICDXBHPRIMARYDX_PSY_ALL_CORE
Severe alcohol use disorder   F10.20  

## 2022-10-17 NOTE — BH CONSULTATION LIAISON PROGRESS NOTE - NSBHCHARTREVIEWLAB_PSY_A_CORE FT
no new labs 
10-17    137  |  106  |  15  ----------------------------<  84  3.7   |  24  |  0.98    Ca    9.0      17 Oct 2022 07:25  Phos  2.8     10-17  Mg     2.1     10-17    TPro  7.5  /  Alb  3.1<L>  /  TBili  0.1<L>  /  DBili  x   /  AST  24  /  ALT  29  /  AlkPhos  36<L>  10-17  
no new labs

## 2022-10-17 NOTE — DISCHARGE NOTE PROVIDER - NSDCCPCAREPLAN_GEN_ALL_CORE_FT
PRINCIPAL DISCHARGE DIAGNOSIS  Diagnosis: Alcohol dependence with withdrawal  Assessment and Plan of Treatment: - you will need to stop drinking alcohol      SECONDARY DISCHARGE DIAGNOSES  Diagnosis: Alcoholic encephalopathy  Assessment and Plan of Treatment:

## 2022-10-17 NOTE — DISCHARGE NOTE PROVIDER - HOSPITAL COURSE
Patient is a 57M with a PMH of chronic ETOH abuse with hx of alcohol withdrawal and DTs with frequent hospital admissions, alcoholic gastritis/esophagitis, PUD, HLD, hx of hypoxic respiratory failure requiring intubation due to aspiration PNA who presents to the ED for abdominal pain and vomiting.  Patient reports significant abdominal pain after drinking alcohol.  Patient unspecific about the amount of drinks per day or when his last drink was.  Noted to be withdrawn in ED.  Vitals stable, labs show lactic acidosis.  Will admit to tele.  (30 Sep 2022 05:21)     He was admitted to the ICU with acute hypoxic respiratory failure.  He also was treated with phenobarbital with poor response and worsening mental sttus.  His mental status improved after a few days of washout.  He is doing well at time of discharge.        GENERAL: NAD, disheveled  HEAD:  Atraumatic, Normocephalic  EYES: EOMI, PERRLA, conjunctiva and sclera clear  ENMT: Moist mucous membranes  NECK: Supple, No JVD  NERVOUS SYSTEM:  Alert & less confused, unsteady gait, Motor Strength 5/5 B/L upper and lower extremities; DTRs 2+ intact and symmetric  CHEST/LUNG: Clear to auscultation bilaterally; No rales, rhonchi, wheezing, or rubs  HEART: Regular rate and rhythm; No murmurs, rubs, or gallops  ABDOMEN: Soft, Nontender, Nondistended; Bowel sounds present  EXTREMITIES:  2+ Peripheral Pulses, No clubbing, cyanosis, or edema    Problem/Plan - 1:  ·  Problem: Alcoholic encephalopathy.   ·  Plan: -   resolving agitation. Phenobabr just PRN now, Zyprexa Qhs  10/13/2022 appreciate og note,  no phenobarb  10/14/22- cleared by psych for d/c when medically ready.  10/16 - ready for d/c home. family cannot take him over the weekend    Problem/Plan - 2:  ·  Problem: Acute respiratory failure with hypoxia.   ·  Plan: -c/w unasyn .   to be discontinued today.

## 2022-10-17 NOTE — BH CONSULTATION LIAISON PROGRESS NOTE - NSBHFUPINTERVALHXFT_PSY_A_CORE
Patient continues on "wash out". Awake, alert, still has some residual inattentiveness, disorientation which is expected. Tearful today asking about his brother and mumbling but was able to say his son drives an [employer] and he helps out at times. Needs redirection, re-orientation. As per staff, Pt's wife was at bedside yesterday, fed Patient and showed him some videos on the phone which he could not really watch due to sedation. 
Patient has returned to baseline including his normal mental status. Did well on the "wash out" protocol with good clinical response; awaiting discharge home today. Sen at bedside: awake, alert, oriented x 4, maintains eyes contact and attention, engages appropriately, speech regular volume and rate, speech/mood/TP/TC within normal limits. Gait steady and normal. Affect euthymic, appropriate and nonlabile. No complaints. He is ready to go home and his family is picking him up today at 4pm. 
+ MSE even better. Patient continues on "wash out" with good clinical response. Awake, alert, more able to maintain attention, engages appropriately, speech regular volume and rate. + redirectable. Affect appropriate and nonlabile. No complaints, wants to go home.

## 2022-10-20 ENCOUNTER — INPATIENT (INPATIENT)
Facility: HOSPITAL | Age: 57
LOS: 1 days | Discharge: HOME | End: 2022-10-22
Attending: STUDENT IN AN ORGANIZED HEALTH CARE EDUCATION/TRAINING PROGRAM | Admitting: STUDENT IN AN ORGANIZED HEALTH CARE EDUCATION/TRAINING PROGRAM

## 2022-10-20 VITALS
HEIGHT: 67 IN | DIASTOLIC BLOOD PRESSURE: 89 MMHG | RESPIRATION RATE: 20 BRPM | OXYGEN SATURATION: 100 % | HEART RATE: 100 BPM | TEMPERATURE: 98 F | WEIGHT: 169.98 LBS | SYSTOLIC BLOOD PRESSURE: 144 MMHG

## 2022-10-20 DIAGNOSIS — K27.9 PEPTIC ULCER, SITE UNSPECIFIED, UNSPECIFIED AS ACUTE OR CHRONIC, WITHOUT HEMORRHAGE OR PERFORATION: ICD-10-CM

## 2022-10-20 DIAGNOSIS — R45.1 RESTLESSNESS AND AGITATION: ICD-10-CM

## 2022-10-20 DIAGNOSIS — T51.0X1A TOXIC EFFECT OF ETHANOL, ACCIDENTAL (UNINTENTIONAL), INITIAL ENCOUNTER: ICD-10-CM

## 2022-10-20 DIAGNOSIS — E83.42 HYPOMAGNESEMIA: ICD-10-CM

## 2022-10-20 DIAGNOSIS — F10.239 ALCOHOL DEPENDENCE WITH WITHDRAWAL, UNSPECIFIED: ICD-10-CM

## 2022-10-20 DIAGNOSIS — E86.0 DEHYDRATION: ICD-10-CM

## 2022-10-20 DIAGNOSIS — J18.9 PNEUMONIA, UNSPECIFIED ORGANISM: ICD-10-CM

## 2022-10-20 DIAGNOSIS — K76.0 FATTY (CHANGE OF) LIVER, NOT ELSEWHERE CLASSIFIED: ICD-10-CM

## 2022-10-20 DIAGNOSIS — K29.20 ALCOHOLIC GASTRITIS WITHOUT BLEEDING: ICD-10-CM

## 2022-10-20 DIAGNOSIS — E87.20 ACIDOSIS, UNSPECIFIED: ICD-10-CM

## 2022-10-20 DIAGNOSIS — E83.39 OTHER DISORDERS OF PHOSPHORUS METABOLISM: ICD-10-CM

## 2022-10-20 DIAGNOSIS — I10 ESSENTIAL (PRIMARY) HYPERTENSION: ICD-10-CM

## 2022-10-20 DIAGNOSIS — F10.229 ALCOHOL DEPENDENCE WITH INTOXICATION, UNSPECIFIED: ICD-10-CM

## 2022-10-20 DIAGNOSIS — G31.2 DEGENERATION OF NERVOUS SYSTEM DUE TO ALCOHOL: ICD-10-CM

## 2022-10-20 DIAGNOSIS — E78.5 HYPERLIPIDEMIA, UNSPECIFIED: ICD-10-CM

## 2022-10-20 DIAGNOSIS — D72.819 DECREASED WHITE BLOOD CELL COUNT, UNSPECIFIED: ICD-10-CM
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
## 2022-10-20 DIAGNOSIS — J96.01 ACUTE RESPIRATORY FAILURE WITH HYPOXIA: ICD-10-CM

## 2022-10-20 LAB
ALBUMIN SERPL ELPH-MCNC: 3.6 G/DL — SIGNIFICANT CHANGE UP (ref 3.3–5)
ALP SERPL-CCNC: 46 U/L — SIGNIFICANT CHANGE UP (ref 40–120)
ALT FLD-CCNC: 32 U/L — SIGNIFICANT CHANGE UP (ref 12–78)
ANION GAP SERPL CALC-SCNC: 15 MMOL/L — SIGNIFICANT CHANGE UP (ref 5–17)
AST SERPL-CCNC: 49 U/L — HIGH (ref 15–37)
BASOPHILS # BLD AUTO: 0.1 K/UL — SIGNIFICANT CHANGE UP (ref 0–0.2)
BASOPHILS NFR BLD AUTO: 1.5 % — SIGNIFICANT CHANGE UP (ref 0–2)
BILIRUB SERPL-MCNC: 0.3 MG/DL — SIGNIFICANT CHANGE UP (ref 0.2–1.2)
BUN SERPL-MCNC: 13 MG/DL — SIGNIFICANT CHANGE UP (ref 7–23)
CALCIUM SERPL-MCNC: 9.4 MG/DL — SIGNIFICANT CHANGE UP (ref 8.5–10.1)
CHLORIDE SERPL-SCNC: 100 MMOL/L — SIGNIFICANT CHANGE UP (ref 96–108)
CO2 SERPL-SCNC: 24 MMOL/L — SIGNIFICANT CHANGE UP (ref 22–31)
CREAT SERPL-MCNC: 0.95 MG/DL — SIGNIFICANT CHANGE UP (ref 0.5–1.3)
EGFR: 95 ML/MIN/1.73M2 — SIGNIFICANT CHANGE UP
EOSINOPHIL # BLD AUTO: 0.12 K/UL — SIGNIFICANT CHANGE UP (ref 0–0.5)
EOSINOPHIL NFR BLD AUTO: 1.8 % — SIGNIFICANT CHANGE UP (ref 0–6)
ETHANOL SERPL-MCNC: 56 MG/DL — HIGH (ref 0–10)
FLUAV AG NPH QL: SIGNIFICANT CHANGE UP
FLUBV AG NPH QL: SIGNIFICANT CHANGE UP
GLUCOSE SERPL-MCNC: 94 MG/DL — SIGNIFICANT CHANGE UP (ref 70–99)
HCT VFR BLD CALC: 38.7 % — LOW (ref 39–50)
HGB BLD-MCNC: 12.1 G/DL — LOW (ref 13–17)
IMM GRANULOCYTES NFR BLD AUTO: 0.3 % — SIGNIFICANT CHANGE UP (ref 0–0.9)
LACTATE SERPL-SCNC: 2.1 MMOL/L — HIGH (ref 0.7–2)
LACTATE SERPL-SCNC: 6.3 MMOL/L — CRITICAL HIGH (ref 0.7–2)
LIDOCAIN IGE QN: 458 U/L — HIGH (ref 73–393)
LYMPHOCYTES # BLD AUTO: 1.37 K/UL — SIGNIFICANT CHANGE UP (ref 1–3.3)
LYMPHOCYTES # BLD AUTO: 20 % — SIGNIFICANT CHANGE UP (ref 13–44)
MCHC RBC-ENTMCNC: 23 PG — LOW (ref 27–34)
MCHC RBC-ENTMCNC: 31.3 G/DL — LOW (ref 32–36)
MCV RBC AUTO: 73.7 FL — LOW (ref 80–100)
MONOCYTES # BLD AUTO: 0.47 K/UL — SIGNIFICANT CHANGE UP (ref 0–0.9)
MONOCYTES NFR BLD AUTO: 6.9 % — SIGNIFICANT CHANGE UP (ref 2–14)
NEUTROPHILS # BLD AUTO: 4.77 K/UL — SIGNIFICANT CHANGE UP (ref 1.8–7.4)
NEUTROPHILS NFR BLD AUTO: 69.5 % — SIGNIFICANT CHANGE UP (ref 43–77)
NRBC # BLD: 0 /100 WBCS — SIGNIFICANT CHANGE UP (ref 0–0)
PLATELET # BLD AUTO: 586 K/UL — HIGH (ref 150–400)
POTASSIUM SERPL-MCNC: 4.5 MMOL/L — SIGNIFICANT CHANGE UP (ref 3.5–5.3)
POTASSIUM SERPL-SCNC: 4.5 MMOL/L — SIGNIFICANT CHANGE UP (ref 3.5–5.3)
PROT SERPL-MCNC: 9 GM/DL — HIGH (ref 6–8.3)
RBC # BLD: 5.25 M/UL — SIGNIFICANT CHANGE UP (ref 4.2–5.8)
RBC # FLD: 20.5 % — HIGH (ref 10.3–14.5)
SARS-COV-2 RNA SPEC QL NAA+PROBE: SIGNIFICANT CHANGE UP
SODIUM SERPL-SCNC: 139 MMOL/L — SIGNIFICANT CHANGE UP (ref 135–145)
TROPONIN I, HIGH SENSITIVITY RESULT: 8.2 NG/L — SIGNIFICANT CHANGE UP
WBC # BLD: 6.85 K/UL — SIGNIFICANT CHANGE UP (ref 3.8–10.5)
WBC # FLD AUTO: 6.85 K/UL — SIGNIFICANT CHANGE UP (ref 3.8–10.5)

## 2022-10-20 PROCEDURE — 99223 1ST HOSP IP/OBS HIGH 75: CPT

## 2022-10-20 PROCEDURE — 93010 ELECTROCARDIOGRAM REPORT: CPT

## 2022-10-20 PROCEDURE — 71045 X-RAY EXAM CHEST 1 VIEW: CPT | Mod: 26

## 2022-10-20 PROCEDURE — 99285 EMERGENCY DEPT VISIT HI MDM: CPT

## 2022-10-20 RX ORDER — SUCRALFATE 1 G
1 TABLET ORAL
Refills: 0 | Status: DISCONTINUED | OUTPATIENT
Start: 2022-10-20 | End: 2022-10-22

## 2022-10-20 RX ORDER — SODIUM CHLORIDE 9 MG/ML
1000 INJECTION INTRAMUSCULAR; INTRAVENOUS; SUBCUTANEOUS ONCE
Refills: 0 | Status: COMPLETED | OUTPATIENT
Start: 2022-10-20 | End: 2022-10-20

## 2022-10-20 RX ORDER — FAMOTIDINE 10 MG/ML
20 INJECTION INTRAVENOUS ONCE
Refills: 0 | Status: COMPLETED | OUTPATIENT
Start: 2022-10-20 | End: 2022-10-20

## 2022-10-20 RX ORDER — SODIUM CHLORIDE 9 MG/ML
1000 INJECTION, SOLUTION INTRAVENOUS
Refills: 0 | Status: DISCONTINUED | OUTPATIENT
Start: 2022-10-20 | End: 2022-10-22

## 2022-10-20 RX ORDER — PANTOPRAZOLE SODIUM 20 MG/1
40 TABLET, DELAYED RELEASE ORAL
Refills: 0 | Status: DISCONTINUED | OUTPATIENT
Start: 2022-10-20 | End: 2022-10-22

## 2022-10-20 RX ORDER — FOLIC ACID 0.8 MG
1 TABLET ORAL DAILY
Refills: 0 | Status: DISCONTINUED | OUTPATIENT
Start: 2022-10-20 | End: 2022-10-22

## 2022-10-20 RX ORDER — MORPHINE SULFATE 50 MG/1
4 CAPSULE, EXTENDED RELEASE ORAL ONCE
Refills: 0 | Status: DISCONTINUED | OUTPATIENT
Start: 2022-10-20 | End: 2022-10-20

## 2022-10-20 RX ORDER — LANOLIN ALCOHOL/MO/W.PET/CERES
3 CREAM (GRAM) TOPICAL AT BEDTIME
Refills: 0 | Status: DISCONTINUED | OUTPATIENT
Start: 2022-10-20 | End: 2022-10-22

## 2022-10-20 RX ORDER — ENOXAPARIN SODIUM 100 MG/ML
40 INJECTION SUBCUTANEOUS EVERY 24 HOURS
Refills: 0 | Status: DISCONTINUED | OUTPATIENT
Start: 2022-10-20 | End: 2022-10-22

## 2022-10-20 RX ORDER — THIAMINE MONONITRATE (VIT B1) 100 MG
100 TABLET ORAL DAILY
Refills: 0 | Status: DISCONTINUED | OUTPATIENT
Start: 2022-10-20 | End: 2022-10-22

## 2022-10-20 RX ORDER — ONDANSETRON 8 MG/1
4 TABLET, FILM COATED ORAL ONCE
Refills: 0 | Status: COMPLETED | OUTPATIENT
Start: 2022-10-20 | End: 2022-10-20

## 2022-10-20 RX ORDER — MORPHINE SULFATE 50 MG/1
2 CAPSULE, EXTENDED RELEASE ORAL EVERY 4 HOURS
Refills: 0 | Status: DISCONTINUED | OUTPATIENT
Start: 2022-10-20 | End: 2022-10-22

## 2022-10-20 RX ORDER — ONDANSETRON 8 MG/1
4 TABLET, FILM COATED ORAL EVERY 8 HOURS
Refills: 0 | Status: DISCONTINUED | OUTPATIENT
Start: 2022-10-20 | End: 2022-10-22

## 2022-10-20 RX ORDER — ACETAMINOPHEN 500 MG
650 TABLET ORAL EVERY 6 HOURS
Refills: 0 | Status: DISCONTINUED | OUTPATIENT
Start: 2022-10-20 | End: 2022-10-22

## 2022-10-20 RX ADMIN — SODIUM CHLORIDE 1000 MILLILITER(S): 9 INJECTION INTRAMUSCULAR; INTRAVENOUS; SUBCUTANEOUS at 18:28

## 2022-10-20 RX ADMIN — FAMOTIDINE 20 MILLIGRAM(S): 10 INJECTION INTRAVENOUS at 17:09

## 2022-10-20 RX ADMIN — SODIUM CHLORIDE 1000 MILLILITER(S): 9 INJECTION INTRAMUSCULAR; INTRAVENOUS; SUBCUTANEOUS at 17:09

## 2022-10-20 RX ADMIN — Medication 50 MILLIGRAM(S): at 21:01

## 2022-10-20 RX ADMIN — Medication 1 MILLIGRAM(S): at 18:28

## 2022-10-20 RX ADMIN — MORPHINE SULFATE 4 MILLIGRAM(S): 50 CAPSULE, EXTENDED RELEASE ORAL at 17:12

## 2022-10-20 RX ADMIN — ONDANSETRON 4 MILLIGRAM(S): 8 TABLET, FILM COATED ORAL at 17:10

## 2022-10-20 NOTE — SBIRT NOTE ADULT - NSSBIRTUNABLESCR_GEN_A_CORE
Patient repeated he hasn't drank in a month. reporting he only drinks occasionally, approx 4 shots. Wouldn't answer complete assessment- complaining of abdominal pain. Actively vomiting./Patient uncooperative

## 2022-10-20 NOTE — ED PROVIDER NOTE - CLINICAL SUMMARY MEDICAL DECISION MAKING FREE TEXT BOX
57M w/ PMH gastritis / PUD, EtOH abuse, DTs pw 'body burning' a/w N/V since this AM. AF, VSS other than + mild tachycardia. Pt appears uncomfortable w/ active emesis in ED. Plan: CBC, CMP, lipase, lactate, CXR, trop, ECG. Give IVF, Zofran, Pepcid. Re-eval. 57M w/ PMH gastritis / PUD, EtOH abuse, DTs pw 'body burning' a/w N/V since this AM. AF, VSS other than + mild tachycardia. Pt appears uncomfortable w/ active emesis in ED. Plan: CBC, CMP, lipase, lactate, CXR, trop, ECG, TRINITY. Give IVF, Zofran, Pepcid. Re-eval.

## 2022-10-20 NOTE — H&P ADULT - ASSESSMENT
Patient is a 57y Male with significant PMH of Gastritis / PUD, ETOH abuse, DTs, microcytic anemia and others presented in ED with c/o generalized abdominal pain and whole body burning since today. He had been recently discharged a few days ago for alcohol withdrawal. He states that his abdomen hurts which started after drinking coffee today morning. He had alcohol yesterday after 4 days since discharge. Pain level is 5/10, without radiation and no aggravating or alleviating factors. He also has non-bloody vomiting today. His appetite is low. He has some tremulousness in his hands. He denies chest pain, sob, fever, chills, diarrhea or dysuria. He also denies any hematochezia, hematemesis or hemoptysis.     Vitals stable.  Sig Labs:  Lipase 458, Lactate 6.3, ETOH 56, Hb 12.1, MCV 73, RDW 20, Cr 0.95.  CXR negative for infiltrates.    NS 2 L, Ativan 1 mg IV, Zofran and morphine given in ED.      1. Acute alcoholic pancreatitis.  Lipase level 458. ETOH 56.  IV fluid D5 NS at 150 ml/hr.  NPO except medications, ice chips and sips of water.  Start clear liquid from tomorrow.  Morphine for pain control.    2. Alcohol withdrawal with abuse.  ETOH level 56, last drink yesterday.  Alcohol abstinence / CIWA protocol ordered.  Ativan, Thiamine, Folic acid and MVI ordered.  Watch for DTs.    3. H/o PUD and Gastritis.  Continue his Protonix and sucralfate.    4. Hypochromic microcytic anemia.  Hb 12.1 at baseline. No active bleeding.  Will get Iron profile, Ferritin, B12 and Folate levels.    5. DVT prophylaxis: Lovenox sq daily.

## 2022-10-20 NOTE — ED ADULT NURSE NOTE - NSIMPLEMENTINTERV_GEN_ALL_ED
Implemented All Fall Risk Interventions:  Mack to call system. Call bell, personal items and telephone within reach. Instruct patient to call for assistance. Room bathroom lighting operational. Non-slip footwear when patient is off stretcher. Physically safe environment: no spills, clutter or unnecessary equipment. Stretcher in lowest position, wheels locked, appropriate side rails in place. Provide visual cue, wrist band, yellow gown, etc. Monitor gait and stability. Monitor for mental status changes and reorient to person, place, and time. Review medications for side effects contributing to fall risk. Reinforce activity limits and safety measures with patient and family.

## 2022-10-20 NOTE — ED ADULT NURSE NOTE - CHIEF COMPLAINT QUOTE
biba c/o abdominal pain with n/v and tremors known hx etoh abuse and cirrhosis states d/c'd from [facility] monday and hasn't drank since d/c moderate tremors b/l hands noted

## 2022-10-20 NOTE — H&P ADULT - NSHPLABSRESULTS_GEN_ALL_CORE
LABS:                        12.1   6.85  )-----------( 586      ( 20 Oct 2022 17:00 )             38.7     10-20    139  |  100  |  13  ----------------------------<  94  4.5   |  24  |  0.95    Ca    9.4      20 Oct 2022 17:00    TPro  9.0<H>  /  Alb  3.6  /  TBili  0.3  /  DBili  x   /  AST  49<H>  /  ALT  32  /  AlkPhos  46  10-20

## 2022-10-20 NOTE — H&P ADULT - HISTORY OF PRESENT ILLNESS
Chief Complaint: abdominal pain and body burning.    Patient is a 57y Male with significant PMH of Gastritis / PUD, ETOH abuse, DTs, microcytic anemia and others presented in ED with c/o generalized abdominal pain and whole body burning since today. He had been recently discharged a few days ago for alcohol withdrawal. He states that his abdomen hurts which started after drinking coffee today morning. He had alcohol yesterday after 4 days since discharge. Pain level is 5/10, without radiation and no aggravating or alleviating factors. He also has non-bloody vomiting today. His appetite is low. He has some tremulousness in his hands. He denies chest pain, sob, fever, chills, diarrhea or dysuria. He also denies any hematochezia, hematemesis or hemoptysis.     Vitals stable.  Sig Labs:  Lipase 458, Lactate 6.3, ETOH 56, Hb 12.1, MCV 73, RDW 20, Cr 0.95.  CXR negative for infiltrates.    NS 2 L, Ativan 1 mg IV, Zofran and morphine given in ED.

## 2022-10-20 NOTE — ED PROVIDER NOTE - PHYSICAL EXAMINATION
GEN: Awake, alert, interactive, NAD.  HEAD AND NECK: NC/AT. Airway patent. Neck supple.   EYES:  Clear b/l. EOMI. PERRL.   ENT: Moist mucus membranes.   CARDIAC: Regular rate, regular rhythm. No evident pedal edema.    RESP/CHEST: Normal respiratory effort with no use of accessory muscles or retractions. Clear throughout on auscultation.  ABD: Soft, non-distended, non-tender. No rebound, no guarding.   BACK: No midline spinal TTP. No CVAT.   EXTREMITIES: Moving all extremities with no apparent deformities.   SKIN: Warm, dry, intact normal color. No rash.   NEURO: AOx3, CN II-XII grossly intact, no focal deficits.   PSYCH: Appropriate mood and affect. GEN: Awake, alert, interactive, pt appears uncomfortable, w/ active emesis in ED.   HEAD AND NECK: NC/AT. Airway patent. Neck supple.   EYES:  Clear b/l. EOMI. PERRL.   ENT: Moist mucus membranes.   CARDIAC: Regular rate, regular rhythm. No evident pedal edema.    RESP/CHEST: Normal respiratory effort with no use of accessory muscles or retractions. Clear throughout on auscultation.  ABD: Soft, non-distended, non-tender. No rebound, no guarding.   BACK: No midline spinal TTP. No CVAT.   EXTREMITIES: Moving all extremities with no apparent deformities. + tremulous.   SKIN: Warm, dry, intact normal color. No rash.   NEURO: AOx3, CN II-XII grossly intact, no focal deficits.   PSYCH: Appropriate mood and affect.

## 2022-10-20 NOTE — ED ADULT NURSE NOTE - OBJECTIVE STATEMENT
Patient is a 57y Male complaining of abdominal pain and vomiting. Stated that he left the hospital last Monday. Hx of etoh abuse but says he did not have a drink since Monday. C/o chills and body pain,  Noted with visible tremors, no auditory or visual hallucinations.

## 2022-10-20 NOTE — ED ADULT NURSE NOTE - NSSUBSTANCEUSE_GEN_ALL_CORE_SD
left to right/right to left/difficulty with smooth pursuits and delayed processing speed; unable to track vertically never used

## 2022-10-20 NOTE — ED ADULT NURSE NOTE - ED STAT RN HANDOFF DETAILS
Handoff report given to PRETTY Slade on 1B. RN made aware of pts current condition/lab values/reason for admission. pt is AOx3, resting comfortably in stretcher at this time. pt doesn't appear to be in any acute distress. pts IV is patent and intact no redness/swelling/pain noted at the site. rounding and safety checks completed. pt is vitally stable at this time. any issues endorsed to oncoming RN for followup

## 2022-10-20 NOTE — ED PROVIDER NOTE - PROGRESS NOTE DETAILS
CECILIA 19, admit to medicine W/u significant for: TRINITY 56, lipase 458, lactate 6.3 > 2.1. CIWA 19, pt given IV Ativan. Will admit to medicine (d/w Dr Degroot). Pt updated to results, admission. He understands / agrees w/ this plan.

## 2022-10-20 NOTE — H&P ADULT - NSHPPHYSICALEXAM_GEN_ALL_CORE
PHYSICAL EXAM:  GENERAL: NAD, lying in bed comfortably  HEAD:  Atraumatic, Normocephalic  EYES: EOMI, PERRLA, conjunctiva and sclera clear  ENT: Moist mucous membranes  NECK: Supple, No JVD  CHEST/LUNG: Clear to auscultation bilaterally; No rales, rhonchi, wheezing, or rubs. Unlabored respirations  HEART: Regular rate and rhythm; No murmurs, rubs, or gallops  ABDOMEN: Bowel sounds present; Soft, mild tenderness in epigastric area, Nondistended. No hepatomegaly  EXTREMITIES:  2+ Peripheral Pulses, brisk capillary refill. No clubbing, cyanosis, or edema  NERVOUS SYSTEM:  Alert & Oriented X3, speech clear. No deficits   MSK: FROM all 4 extremities, full and equal strength  SKIN: No rashes or lesions

## 2022-10-20 NOTE — ED ADULT TRIAGE NOTE - CHIEF COMPLAINT QUOTE
biba c/o abdominal pain with n/v and tremors known hx etoh abuse and cirrhosis states d/c'd from [facility] monday and hasn't drank since d/c moderate tremors b/l hands noted

## 2022-10-20 NOTE — ED PROVIDER NOTE - OBJECTIVE STATEMENT
57M w/ PMH gastritis / PUD, EtOh abuse, DTs pw 'body burning' a/w N/V. Pt states onset this AM s/p drinking coffee. Pt d/c'd from [facility] 4 days ago. Pt states he has not drank alcohol since d/c. Denies fever, CP, SOB, cough, flank pain, diarrhea, constipation, UTI sx, LE pain / swelling. + tremulous.     PMH as above, PSH none, NKDA

## 2022-10-21 LAB
ANION GAP SERPL CALC-SCNC: 8 MMOL/L — SIGNIFICANT CHANGE UP (ref 5–17)
BUN SERPL-MCNC: 15 MG/DL — SIGNIFICANT CHANGE UP (ref 7–23)
CALCIUM SERPL-MCNC: 8.6 MG/DL — SIGNIFICANT CHANGE UP (ref 8.5–10.1)
CHLORIDE SERPL-SCNC: 107 MMOL/L — SIGNIFICANT CHANGE UP (ref 96–108)
CO2 SERPL-SCNC: 26 MMOL/L — SIGNIFICANT CHANGE UP (ref 22–31)
CREAT SERPL-MCNC: 0.87 MG/DL — SIGNIFICANT CHANGE UP (ref 0.5–1.3)
EGFR: 102 ML/MIN/1.73M2 — SIGNIFICANT CHANGE UP
FERRITIN SERPL-MCNC: 23 NG/ML — LOW (ref 30–400)
FOLATE SERPL-MCNC: 16.9 NG/ML — SIGNIFICANT CHANGE UP
GLUCOSE SERPL-MCNC: 127 MG/DL — HIGH (ref 70–99)
HCT VFR BLD CALC: 32.8 % — LOW (ref 39–50)
HGB BLD-MCNC: 10 G/DL — LOW (ref 13–17)
IRON SATN MFR SERPL: 115 UG/DL — SIGNIFICANT CHANGE UP (ref 45–165)
IRON SATN MFR SERPL: 34 % — SIGNIFICANT CHANGE UP (ref 16–55)
LIDOCAIN IGE QN: 382 U/L — SIGNIFICANT CHANGE UP (ref 73–393)
MAGNESIUM SERPL-MCNC: 2.1 MG/DL — SIGNIFICANT CHANGE UP (ref 1.6–2.6)
MCHC RBC-ENTMCNC: 23.3 PG — LOW (ref 27–34)
MCHC RBC-ENTMCNC: 30.5 G/DL — LOW (ref 32–36)
MCV RBC AUTO: 76.3 FL — LOW (ref 80–100)
NRBC # BLD: 0 /100 WBCS — SIGNIFICANT CHANGE UP (ref 0–0)
PHOSPHATE SERPL-MCNC: 2.2 MG/DL — LOW (ref 2.5–4.5)
PLATELET # BLD AUTO: 505 K/UL — HIGH (ref 150–400)
POTASSIUM SERPL-MCNC: 3.7 MMOL/L — SIGNIFICANT CHANGE UP (ref 3.5–5.3)
POTASSIUM SERPL-SCNC: 3.7 MMOL/L — SIGNIFICANT CHANGE UP (ref 3.5–5.3)
RBC # BLD: 4.3 M/UL — SIGNIFICANT CHANGE UP (ref 4.2–5.8)
RBC # FLD: 20.1 % — HIGH (ref 10.3–14.5)
SODIUM SERPL-SCNC: 141 MMOL/L — SIGNIFICANT CHANGE UP (ref 135–145)
TIBC SERPL-MCNC: 333 UG/DL — SIGNIFICANT CHANGE UP (ref 220–430)
UIBC SERPL-MCNC: 219 UG/DL — SIGNIFICANT CHANGE UP (ref 110–370)
VIT B12 SERPL-MCNC: 480 PG/ML — SIGNIFICANT CHANGE UP (ref 232–1245)
WBC # BLD: 5.64 K/UL — SIGNIFICANT CHANGE UP (ref 3.8–10.5)
WBC # FLD AUTO: 5.64 K/UL — SIGNIFICANT CHANGE UP (ref 3.8–10.5)

## 2022-10-21 PROCEDURE — 99233 SBSQ HOSP IP/OBS HIGH 50: CPT

## 2022-10-21 RX ORDER — INFLUENZA VIRUS VACCINE 15; 15; 15; 15 UG/.5ML; UG/.5ML; UG/.5ML; UG/.5ML
0.5 SUSPENSION INTRAMUSCULAR ONCE
Refills: 0 | Status: DISCONTINUED | OUTPATIENT
Start: 2022-10-21 | End: 2022-10-22

## 2022-10-21 RX ADMIN — Medication 1 GRAM(S): at 05:59

## 2022-10-21 RX ADMIN — MORPHINE SULFATE 2 MILLIGRAM(S): 50 CAPSULE, EXTENDED RELEASE ORAL at 03:00

## 2022-10-21 RX ADMIN — MORPHINE SULFATE 2 MILLIGRAM(S): 50 CAPSULE, EXTENDED RELEASE ORAL at 17:10

## 2022-10-21 RX ADMIN — MORPHINE SULFATE 2 MILLIGRAM(S): 50 CAPSULE, EXTENDED RELEASE ORAL at 10:25

## 2022-10-21 RX ADMIN — MORPHINE SULFATE 2 MILLIGRAM(S): 50 CAPSULE, EXTENDED RELEASE ORAL at 21:06

## 2022-10-21 RX ADMIN — Medication 1 TABLET(S): at 11:34

## 2022-10-21 RX ADMIN — Medication 1 GRAM(S): at 11:33

## 2022-10-21 RX ADMIN — Medication 50 MILLIGRAM(S): at 18:02

## 2022-10-21 RX ADMIN — SODIUM CHLORIDE 150 MILLILITER(S): 9 INJECTION, SOLUTION INTRAVENOUS at 02:29

## 2022-10-21 RX ADMIN — MORPHINE SULFATE 2 MILLIGRAM(S): 50 CAPSULE, EXTENDED RELEASE ORAL at 22:06

## 2022-10-21 RX ADMIN — MORPHINE SULFATE 2 MILLIGRAM(S): 50 CAPSULE, EXTENDED RELEASE ORAL at 16:53

## 2022-10-21 RX ADMIN — MORPHINE SULFATE 2 MILLIGRAM(S): 50 CAPSULE, EXTENDED RELEASE ORAL at 02:28

## 2022-10-21 RX ADMIN — PANTOPRAZOLE SODIUM 40 MILLIGRAM(S): 20 TABLET, DELAYED RELEASE ORAL at 06:03

## 2022-10-21 RX ADMIN — Medication 50 MILLIGRAM(S): at 12:09

## 2022-10-21 RX ADMIN — ENOXAPARIN SODIUM 40 MILLIGRAM(S): 100 INJECTION SUBCUTANEOUS at 05:57

## 2022-10-21 RX ADMIN — Medication 100 MILLIGRAM(S): at 11:33

## 2022-10-21 RX ADMIN — MORPHINE SULFATE 2 MILLIGRAM(S): 50 CAPSULE, EXTENDED RELEASE ORAL at 10:07

## 2022-10-21 RX ADMIN — Medication 1 MILLIGRAM(S): at 11:33

## 2022-10-21 RX ADMIN — Medication 1 GRAM(S): at 01:42

## 2022-10-21 RX ADMIN — Medication 1 GRAM(S): at 18:02

## 2022-10-21 NOTE — PATIENT PROFILE ADULT - FALL HARM RISK - HARM RISK INTERVENTIONS

## 2022-10-21 NOTE — PROGRESS NOTE ADULT - SUBJECTIVE AND OBJECTIVE BOX
Patient is a 57y old  Male who presents with a chief complaint of Alcoholic pancreatitis and alcohol withdrawal. (20 Oct 2022 19:21)    INTERVAL HPI/OVERNIGHT EVENTS: Patients seen and examined at bedside this morning. No acute events overnight. Pt reports tremors.     MEDICATIONS  (STANDING):  chlordiazePOXIDE   Oral   chlordiazePOXIDE 50 milliGRAM(s) Oral every 6 hours  dextrose 5% + sodium chloride 0.9%. 1000 milliLiter(s) (150 mL/Hr) IV Continuous <Continuous>  enoxaparin Injectable 40 milliGRAM(s) SubCutaneous every 24 hours  folic acid 1 milliGRAM(s) Oral daily  influenza   Vaccine 0.5 milliLiter(s) IntraMuscular once  multivitamin 1 Tablet(s) Oral daily  pantoprazole    Tablet 40 milliGRAM(s) Oral before breakfast  sucralfate 1 Gram(s) Oral four times a day  thiamine 100 milliGRAM(s) Oral daily    MEDICATIONS  (PRN):  acetaminophen     Tablet .. 650 milliGRAM(s) Oral every 6 hours PRN Temp greater or equal to 38C (100.4F), Mild Pain (1 - 3)  aluminum hydroxide/magnesium hydroxide/simethicone Suspension 30 milliLiter(s) Oral every 4 hours PRN Dyspepsia  LORazepam   Injectable 2 milliGRAM(s) IV Push every 2 hours PRN CIWA-Ar score increase by 2 points and a total score of 7 or less  LORazepam   Injectable 2 milliGRAM(s) IV Push every 1 hour PRN CIWA-Ar score 8 or greater  melatonin 3 milliGRAM(s) Oral at bedtime PRN Insomnia  morphine  - Injectable 2 milliGRAM(s) IV Push every 4 hours PRN Moderate Pain (4 - 6)  ondansetron Injectable 4 milliGRAM(s) IV Push every 8 hours PRN Nausea and/or Vomiting    Allergies    No Known Allergies    Intolerances      REVIEW OF SYSTEMS:  All other systems reviewed and are negative    Vital Signs Last 24 Hrs  T(C): 37.1 (21 Oct 2022 12:38), Max: 37.1 (21 Oct 2022 12:38)  T(F): 98.8 (21 Oct 2022 12:38), Max: 98.8 (21 Oct 2022 12:38)  HR: 74 (21 Oct 2022 12:38) (67 - 101)  BP: 150/92 (21 Oct 2022 12:38) (126/73 - 150/92)  BP(mean): --  RR: 18 (21 Oct 2022 12:38) (16 - 20)  SpO2: 98% (21 Oct 2022 12:38) (95% - 100%)    Parameters below as of 21 Oct 2022 12:38  Patient On (Oxygen Delivery Method): room air      Daily Height in cm: 170.18 (20 Oct 2022 14:50)    Daily   I&O's Summary    CAPILLARY BLOOD GLUCOSE        PHYSICAL EXAM:  GENERAL: NAD, well-groomed, well-developed  HEAD:  Atraumatic, Normocephalic  EYES: EOMI, PERRLA, conjunctiva and sclera clear  ENMT: No tonsillar erythema, exudates, or enlargement; Moist mucous membranes, Good dentition, No lesions  NECK: Supple, No JVD, Normal thyroid  NERVOUS SYSTEM:  Alert & Oriented X3, Good concentration; Motor Strength 5/5 B/L upper and lower extremities; DTRs 2+ intact and symmetric  CHEST/LUNG: Clear to percussion bilaterally; No rales, rhonchi, wheezing, or rubs  HEART: Regular rate and rhythm; No murmurs, rubs, or gallops  ABDOMEN: Soft, Nontender, Nondistended; Bowel sounds present  EXTREMITIES:  2+ Peripheral Pulses, No clubbing, cyanosis, or edema  LYMPH: No lymphadenopathy noted  SKIN: No rashes or lesions    Labs                          10.0   5.64  )-----------( 505      ( 21 Oct 2022 09:30 )             32.8     10-21    141  |  107  |  15  ----------------------------<  127<H>  3.7   |  26  |  0.87    Ca    8.6      21 Oct 2022 09:30  Phos  2.2     10-21  Mg     2.1     10-21    TPro  9.0<H>  /  Alb  3.6  /  TBili  0.3  /  DBili  x   /  AST  49<H>  /  ALT  32  /  AlkPhos  46  10-20

## 2022-10-21 NOTE — PATIENT PROFILE ADULT - FUNCTIONAL ASSESSMENT - DAILY ACTIVITY 2.
No respiratory distress. No stridor, Lungs sounds clear with good aeration bilaterally.
4 = No assist / stand by assistance

## 2022-10-21 NOTE — PATIENT PROFILE ADULT - TOBACCO USE
74yo F with PMH of DM, HTN, HLD, CKD. Presented to ED on 10/14 with SOB. Found to have MAR on CKD w/ AGMA and hyperkalemia requiring urgent HD. COVID19+. Developed new onset Afib during HD - started on cardizem gtt and heparin gtt. Developed GI bleed and relative hypotension; anticoagulation d/c'd. Transfused 2 units PRBC; no further bleeding, H/H remains stable, hemodynamically stable. Started on amiodarone gtt yesterday for AF RVR; will transition to PO loading dose today. Advanced to clear liquid diet today, continue PPI BID. Pt with recent outpatient colonoscopy (1 month ago) with ? diverticulosis. Seen by GI today; okay to trial heparin gtt again, will aim for patient-specific goal PTT 50-90. 2nd HD session today; renal following. Given monocloncal antibody for Covid on 10/15.  Pt is currently medically stable for transfer to telemetry.    Pt seen and case discussed with ICU attending Dr. Field.   Verbal handoff given to hospitalist Dr. Hernández. Never smoker

## 2022-10-21 NOTE — PROGRESS NOTE ADULT - ASSESSMENT
Patient is a 57y Male with significant PMH of Gastritis / PUD, ETOH abuse, DTs, microcytic anemia and others presented in ED with c/o generalized abdominal pain and whole body burning since today. He had been recently discharged a few days ago for alcohol withdrawal. He states that his abdomen hurts which started after drinking coffee today morning. He had alcohol yesterday after 4 days since discharge. Pain level is 5/10, without radiation and no aggravating or alleviating factors. He also has non-bloody vomiting today. His appetite is low. He has some tremulousness in his hands. He denies chest pain, sob, fever, chills, diarrhea or dysuria. He also denies any hematochezia, hematemesis or hemoptysis.     Vitals stable.  Sig Labs:  Lipase 458, Lactate 6.3, ETOH 56, Hb 12.1, MCV 73, RDW 20, Cr 0.95.  CXR negative for infiltrates.    NS 2 L, Ativan 1 mg IV, Zofran and morphine given in ED.      1. Acute alcoholic pancreatitis.  Lipase level 458. ETOH 56.  IV fluid D5 NS at 150 ml/hr.  NPO except medications, ice chips and sips of water.  Start clear liquid from tomorrow.  Morphine for pain control.    2. Alcohol withdrawal with abuse.  ETOH level 56, last drink yesterday.  Alcohol abstinence / CIWA protocol ordered.  Ativan, Thiamine, Folic acid and MVI ordered.  Watch for DTs.  (10/21) librium taper    3. H/o PUD and Gastritis.  Continue his Protonix and sucralfate.    4. Hypochromic microcytic anemia.  Hb 12.1 at baseline. No active bleeding.  Will get Iron profile, Ferritin, B12 and Folate levels.    5. DVT prophylaxis: Lovenox sq daily.

## 2022-10-22 ENCOUNTER — TRANSCRIPTION ENCOUNTER (OUTPATIENT)
Age: 57
End: 2022-10-22

## 2022-10-22 VITALS
OXYGEN SATURATION: 98 % | DIASTOLIC BLOOD PRESSURE: 69 MMHG | SYSTOLIC BLOOD PRESSURE: 116 MMHG | RESPIRATION RATE: 18 BRPM | HEART RATE: 77 BPM | TEMPERATURE: 98 F

## 2022-10-22 LAB
ALBUMIN SERPL ELPH-MCNC: 3 G/DL — LOW (ref 3.3–5)
ALP SERPL-CCNC: 54 U/L — SIGNIFICANT CHANGE UP (ref 40–120)
ALT FLD-CCNC: 19 U/L — SIGNIFICANT CHANGE UP (ref 12–78)
ANION GAP SERPL CALC-SCNC: 9 MMOL/L — SIGNIFICANT CHANGE UP (ref 5–17)
AST SERPL-CCNC: 17 U/L — SIGNIFICANT CHANGE UP (ref 15–37)
BILIRUB SERPL-MCNC: 0.4 MG/DL — SIGNIFICANT CHANGE UP (ref 0.2–1.2)
BUN SERPL-MCNC: 9 MG/DL — SIGNIFICANT CHANGE UP (ref 7–23)
CALCIUM SERPL-MCNC: 8.8 MG/DL — SIGNIFICANT CHANGE UP (ref 8.5–10.1)
CHLORIDE SERPL-SCNC: 106 MMOL/L — SIGNIFICANT CHANGE UP (ref 96–108)
CO2 SERPL-SCNC: 23 MMOL/L — SIGNIFICANT CHANGE UP (ref 22–31)
CREAT SERPL-MCNC: 0.85 MG/DL — SIGNIFICANT CHANGE UP (ref 0.5–1.3)
EGFR: 103 ML/MIN/1.73M2 — SIGNIFICANT CHANGE UP
GLUCOSE SERPL-MCNC: 86 MG/DL — SIGNIFICANT CHANGE UP (ref 70–99)
HCT VFR BLD CALC: 34.9 % — LOW (ref 39–50)
HGB BLD-MCNC: 10.6 G/DL — LOW (ref 13–17)
MAGNESIUM SERPL-MCNC: 2.1 MG/DL — SIGNIFICANT CHANGE UP (ref 1.6–2.6)
MCHC RBC-ENTMCNC: 23.3 PG — LOW (ref 27–34)
MCHC RBC-ENTMCNC: 30.4 G/DL — LOW (ref 32–36)
MCV RBC AUTO: 76.7 FL — LOW (ref 80–100)
NRBC # BLD: 0 /100 WBCS — SIGNIFICANT CHANGE UP (ref 0–0)
PHOSPHATE SERPL-MCNC: 2.7 MG/DL — SIGNIFICANT CHANGE UP (ref 2.5–4.5)
PLATELET # BLD AUTO: 479 K/UL — HIGH (ref 150–400)
POTASSIUM SERPL-MCNC: 3.5 MMOL/L — SIGNIFICANT CHANGE UP (ref 3.5–5.3)
POTASSIUM SERPL-SCNC: 3.5 MMOL/L — SIGNIFICANT CHANGE UP (ref 3.5–5.3)
PROT SERPL-MCNC: 7.2 GM/DL — SIGNIFICANT CHANGE UP (ref 6–8.3)
RBC # BLD: 4.55 M/UL — SIGNIFICANT CHANGE UP (ref 4.2–5.8)
RBC # FLD: 19.9 % — HIGH (ref 10.3–14.5)
SODIUM SERPL-SCNC: 138 MMOL/L — SIGNIFICANT CHANGE UP (ref 135–145)
WBC # BLD: 4.09 K/UL — SIGNIFICANT CHANGE UP (ref 3.8–10.5)
WBC # FLD AUTO: 4.09 K/UL — SIGNIFICANT CHANGE UP (ref 3.8–10.5)

## 2022-10-22 PROCEDURE — 99239 HOSP IP/OBS DSCHRG MGMT >30: CPT

## 2022-10-22 RX ORDER — SUCRALFATE 1 G
1 TABLET ORAL
Qty: 120 | Refills: 0
Start: 2022-10-22 | End: 2022-11-20

## 2022-10-22 RX ORDER — PANTOPRAZOLE SODIUM 20 MG/1
1 TABLET, DELAYED RELEASE ORAL
Qty: 30 | Refills: 0
Start: 2022-10-22 | End: 2022-11-20

## 2022-10-22 RX ADMIN — Medication 1 GRAM(S): at 00:19

## 2022-10-22 RX ADMIN — Medication 50 MILLIGRAM(S): at 13:11

## 2022-10-22 RX ADMIN — MORPHINE SULFATE 2 MILLIGRAM(S): 50 CAPSULE, EXTENDED RELEASE ORAL at 05:49

## 2022-10-22 RX ADMIN — Medication 50 MILLIGRAM(S): at 00:19

## 2022-10-22 RX ADMIN — Medication 100 MILLIGRAM(S): at 11:16

## 2022-10-22 RX ADMIN — ENOXAPARIN SODIUM 40 MILLIGRAM(S): 100 INJECTION SUBCUTANEOUS at 05:49

## 2022-10-22 RX ADMIN — Medication 50 MILLIGRAM(S): at 05:49

## 2022-10-22 RX ADMIN — Medication 1 GRAM(S): at 17:56

## 2022-10-22 RX ADMIN — SODIUM CHLORIDE 150 MILLILITER(S): 9 INJECTION, SOLUTION INTRAVENOUS at 05:57

## 2022-10-22 RX ADMIN — Medication 1 MILLIGRAM(S): at 11:16

## 2022-10-22 RX ADMIN — MORPHINE SULFATE 2 MILLIGRAM(S): 50 CAPSULE, EXTENDED RELEASE ORAL at 11:50

## 2022-10-22 RX ADMIN — Medication 1 TABLET(S): at 11:16

## 2022-10-22 RX ADMIN — Medication 1 GRAM(S): at 11:16

## 2022-10-22 RX ADMIN — MORPHINE SULFATE 2 MILLIGRAM(S): 50 CAPSULE, EXTENDED RELEASE ORAL at 11:21

## 2022-10-22 RX ADMIN — Medication 1 GRAM(S): at 05:49

## 2022-10-22 RX ADMIN — PANTOPRAZOLE SODIUM 40 MILLIGRAM(S): 20 TABLET, DELAYED RELEASE ORAL at 08:41

## 2022-10-22 NOTE — DISCHARGE NOTE NURSING/CASE MANAGEMENT/SOCIAL WORK - NSDCPNINST_GEN_ALL_CORE
Pt verbalized understanding of discharge instructions, medications and scheduling follow up visit with MD. Pt is knowledgeable of s/s of complications to report to MD/return to ER.

## 2022-10-22 NOTE — DISCHARGE NOTE NURSING/CASE MANAGEMENT/SOCIAL WORK - PATIENT PORTAL LINK FT
You can access the FollowMyHealth Patient Portal offered by [facility] by registering at the following website: [email or web address]. By joining [employer]’s FollowMyHealth portal, you will also be able to view your health information using other applications (apps) compatible with our system.

## 2022-10-22 NOTE — DISCHARGE NOTE NURSING/CASE MANAGEMENT/SOCIAL WORK - NSDCPEFALRISK_GEN_ALL_CORE
For information on Fall & Injury Prevention, visit: [email or web address] OR  [email or web address] OR  https://www.cdc.gov/steadi/patient.html

## 2022-10-22 NOTE — DISCHARGE NOTE PROVIDER - NSDCCPCAREPLAN_GEN_ALL_CORE_FT
PRINCIPAL DISCHARGE DIAGNOSIS  Diagnosis: Alcohol dependence with withdrawal  Assessment and Plan of Treatment:

## 2022-10-22 NOTE — DISCHARGE NOTE PROVIDER - HOSPITAL COURSE
Patient is a 57y Male with significant PMH of Gastritis / PUD, ETOH abuse, DTs, microcytic anemia and others presented in ED with c/o generalized abdominal pain and whole body burning since today. He had been recently discharged a few days ago for alcohol withdrawal. He states that his abdomen hurts which started after drinking coffee today morning. He had alcohol yesterday after 4 days since discharge. Pain level is 5/10, without radiation and no aggravating or alleviating factors. He also has non-bloody vomiting today. His appetite is low. He has some tremulousness in his hands. He denies chest pain, sob, fever, chills, diarrhea or dysuria. He also denies any hematochezia, hematemesis or hemoptysis.     Vitals stable.  Sig Labs:  Lipase 458, Lactate 6.3, ETOH 56, Hb 12.1, MCV 73, RDW 20, Cr 0.95.  CXR negative for infiltrates.    NS 2 L, Ativan 1 mg IV, Zofran and morphine given in ED.  Patient now clinically stable for discharge with outpatient PCP follow up.     1. Acute alcoholic pancreatitis.  Lipase level 458. ETOH 56.  IV fluid D5 NS at 150 ml/hr.  NPO except medications, ice chips and sips of water.  Start clear liquid transitioned to dash diet.   Morphine for pain control.    2. Alcohol withdrawal with abuse.  ETOH level 56, last drink yesterday.  Alcohol abstinence / CIWA protocol ordered.  Ativan, Thiamine, Folic acid and MVI ordered.  Watch for DTs.  (10/21) librium taper    3. H/o PUD and Gastritis.  Continue his Protonix and sucralfate.    4. Hypochromic microcytic anemia.  Hb 12.1 at baseline. No active bleeding.    5. DVT prophylaxis: Lovenox sq daily.

## 2022-10-22 NOTE — DISCHARGE NOTE NURSING/CASE MANAGEMENT/SOCIAL WORK - NSDCVIVACCINE_GEN_ALL_CORE_FT
COVID-19, mRNA, LNP-S, PF, 100 mcg/ 0.5 mL dose (Moderna); 10-Jovan-2021 15:38; Reji Hernandez (RN); [employer] [id number] (Exp. Date: 13-Jul-2021); IntraMuscular; Deltoid Right.; 0.5 milliLiter(s);   influenza, injectable, quadrivalent, preservative free; 31-Jan-2019 15:53; Ayaka Bynum (RN); [employer]; [id number] (Exp. Date: 30-Jun-2019); IntraMuscular; Deltoid Right.; 0.5 milliLiter(s); VIS (VIS Published: 07-Aug-2015, VIS Presented: 31-Jan-2019);   influenza, injectable, quadrivalent, preservative free; 10-Nov-2019 14:13; Laverne Lane (RN); [employer]; [id number] (Exp. Date: 30-Jun-2020); IntraMuscular; Deltoid Left.; 0.5 milliLiter(s); VIS (VIS Published: 15-Aug-2019, VIS Presented: 10-Nov-2019);   influenza, injectable, quadrivalent, preservative free; 13-Oct-2020 11:56; Kimberli Cronin (RN); [employer]; [id number] (Exp. Date: 30-Jun-2021); IntraMuscular; Deltoid Right.; 0.5 milliLiter(s); VIS (VIS Published: 15-Aug-2019, VIS Presented: 13-Oct-2020);   Td (adult) preservative free; 18-Jan-2020 20:04; Yulia Vance (RN); [employer]; [id number] (Exp. Date: 19-Jan-2021); IntraMuscular; Deltoid Right.; 0.5 milliLiter(s); VIS (VIS Published: 18-Jan-2020, VIS Presented: 18-Jan-2020);

## 2022-10-22 NOTE — DISCHARGE NOTE PROVIDER - NSDCMRMEDTOKEN_GEN_ALL_CORE_FT
pantoprazole 40 mg oral delayed release tablet: 1 tab(s) orally once a day (before a meal)  sucralfate 1 g oral tablet: 1 tab(s) orally 4 times a day

## 2022-10-22 NOTE — DISCHARGE NOTE PROVIDER - ATTENDING DISCHARGE PHYSICAL EXAMINATION:
Physical Exam: PHYSICAL EXAM:  GENERAL: NAD, lying in bed comfortably  HEAD:  Atraumatic, Normocephalic  EYES: EOMI, PERRLA, conjunctiva and sclera clear  ENT: Moist mucous membranes  NECK: Supple, No JVD  CHEST/LUNG: Clear to auscultation bilaterally; No rales, rhonchi, wheezing, or rubs. Unlabored respirations  HEART: Regular rate and rhythm; No murmurs, rubs, or gallops  ABDOMEN: Bowel sounds present; Soft, mild tenderness in epigastric area, Nondistended. No hepatomegaly  EXTREMITIES:  2+ Peripheral Pulses, brisk capillary refill. No clubbing, cyanosis, or edema  NERVOUS SYSTEM:  Alert & Oriented X3, speech clear. No deficits   MSK: FROM all 4 extremities, full and equal strength  SKIN: No rashes or lesions

## 2022-10-24 LAB
CHOLEST SERPL-MCNC: 225 MG/DL — HIGH
HDLC SERPL-MCNC: 51 MG/DL — SIGNIFICANT CHANGE UP
LIPID PNL WITH DIRECT LDL SERPL: 132 MG/DL — HIGH
NON HDL CHOLESTEROL: 173 MG/DL — HIGH
TRIGL SERPL-MCNC: 208 MG/DL — HIGH

## 2022-10-28 DIAGNOSIS — F10.239 ALCOHOL DEPENDENCE WITH WITHDRAWAL, UNSPECIFIED: ICD-10-CM
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
## 2022-10-28 DIAGNOSIS — D50.9 IRON DEFICIENCY ANEMIA, UNSPECIFIED: ICD-10-CM

## 2022-10-28 DIAGNOSIS — Z87.11 PERSONAL HISTORY OF PEPTIC ULCER DISEASE: ICD-10-CM

## 2022-10-28 DIAGNOSIS — E78.2 MIXED HYPERLIPIDEMIA: ICD-10-CM

## 2022-10-28 DIAGNOSIS — K85.20 ALCOHOL INDUCED ACUTE PANCREATITIS WITHOUT NECROSIS OR INFECTION: ICD-10-CM

## 2022-11-05 ENCOUNTER — EMERGENCY (EMERGENCY)
Facility: HOSPITAL | Age: 57
LOS: 0 days | Discharge: HOME | End: 2022-11-05
Attending: EMERGENCY MEDICINE

## 2022-11-05 VITALS
SYSTOLIC BLOOD PRESSURE: 149 MMHG | HEART RATE: 113 BPM | RESPIRATION RATE: 17 BRPM | WEIGHT: 139.99 LBS | HEIGHT: 67 IN | TEMPERATURE: 98 F | OXYGEN SATURATION: 96 % | DIASTOLIC BLOOD PRESSURE: 100 MMHG

## 2022-11-05 VITALS
TEMPERATURE: 98 F | DIASTOLIC BLOOD PRESSURE: 90 MMHG | SYSTOLIC BLOOD PRESSURE: 136 MMHG | HEART RATE: 98 BPM | OXYGEN SATURATION: 97 % | RESPIRATION RATE: 16 BRPM

## 2022-11-05 DIAGNOSIS — R11.2 NAUSEA WITH VOMITING, UNSPECIFIED: ICD-10-CM

## 2022-11-05 DIAGNOSIS — F10.20 ALCOHOL DEPENDENCE, UNCOMPLICATED: ICD-10-CM

## 2022-11-05 DIAGNOSIS — Z20.822 CONTACT WITH AND (SUSPECTED) EXPOSURE TO COVID-19: ICD-10-CM

## 2022-11-05 DIAGNOSIS — R10.9 UNSPECIFIED ABDOMINAL PAIN: ICD-10-CM

## 2022-11-05 LAB
ALBUMIN SERPL ELPH-MCNC: 3.4 G/DL — SIGNIFICANT CHANGE UP (ref 3.3–5)
ALP SERPL-CCNC: 64 U/L — SIGNIFICANT CHANGE UP (ref 40–120)
ALT FLD-CCNC: 50 U/L — SIGNIFICANT CHANGE UP (ref 12–78)
ANION GAP SERPL CALC-SCNC: 14 MMOL/L — SIGNIFICANT CHANGE UP (ref 5–17)
ANISOCYTOSIS BLD QL: SLIGHT — SIGNIFICANT CHANGE UP
APTT BLD: 25.1 SEC — LOW (ref 27.5–35.5)
AST SERPL-CCNC: 70 U/L — HIGH (ref 15–37)
BASOPHILS # BLD AUTO: 0.04 K/UL — SIGNIFICANT CHANGE UP (ref 0–0.2)
BASOPHILS NFR BLD AUTO: 1 % — SIGNIFICANT CHANGE UP (ref 0–2)
BILIRUB SERPL-MCNC: 0.4 MG/DL — SIGNIFICANT CHANGE UP (ref 0.2–1.2)
BUN SERPL-MCNC: 7 MG/DL — SIGNIFICANT CHANGE UP (ref 7–23)
CALCIUM SERPL-MCNC: 9 MG/DL — SIGNIFICANT CHANGE UP (ref 8.5–10.1)
CHLORIDE SERPL-SCNC: 107 MMOL/L — SIGNIFICANT CHANGE UP (ref 96–108)
CO2 SERPL-SCNC: 26 MMOL/L — SIGNIFICANT CHANGE UP (ref 22–31)
CREAT SERPL-MCNC: 1 MG/DL — SIGNIFICANT CHANGE UP (ref 0.5–1.3)
DACRYOCYTES BLD QL SMEAR: SLIGHT — SIGNIFICANT CHANGE UP
EGFR: 89 ML/MIN/1.73M2 — SIGNIFICANT CHANGE UP
ELLIPTOCYTES BLD QL SMEAR: SLIGHT — SIGNIFICANT CHANGE UP
EOSINOPHIL # BLD AUTO: 0.16 K/UL — SIGNIFICANT CHANGE UP (ref 0–0.5)
EOSINOPHIL NFR BLD AUTO: 4 % — SIGNIFICANT CHANGE UP (ref 0–6)
ETHANOL SERPL-MCNC: 207 MG/DL — HIGH (ref 0–10)
FLUAV AG NPH QL: SIGNIFICANT CHANGE UP
FLUBV AG NPH QL: SIGNIFICANT CHANGE UP
GLUCOSE SERPL-MCNC: 102 MG/DL — HIGH (ref 70–99)
HCT VFR BLD CALC: 37.9 % — LOW (ref 39–50)
HGB BLD-MCNC: 11.7 G/DL — LOW (ref 13–17)
HYPOCHROMIA BLD QL: SLIGHT — SIGNIFICANT CHANGE UP
IMM GRANULOCYTES NFR BLD AUTO: 0.3 % — SIGNIFICANT CHANGE UP (ref 0–0.9)
INR BLD: 1.13 RATIO — SIGNIFICANT CHANGE UP (ref 0.88–1.16)
LIDOCAIN IGE QN: 232 U/L — SIGNIFICANT CHANGE UP (ref 73–393)
LYMPHOCYTES # BLD AUTO: 1.2 K/UL — SIGNIFICANT CHANGE UP (ref 1–3.3)
LYMPHOCYTES # BLD AUTO: 30.2 % — SIGNIFICANT CHANGE UP (ref 13–44)
MAGNESIUM SERPL-MCNC: 1.8 MG/DL — SIGNIFICANT CHANGE UP (ref 1.6–2.6)
MANUAL SMEAR VERIFICATION: SIGNIFICANT CHANGE UP
MCHC RBC-ENTMCNC: 23.7 PG — LOW (ref 27–34)
MCHC RBC-ENTMCNC: 30.9 G/DL — LOW (ref 32–36)
MCV RBC AUTO: 76.7 FL — LOW (ref 80–100)
MICROCYTES BLD QL: SLIGHT — SIGNIFICANT CHANGE UP
MONOCYTES # BLD AUTO: 0.32 K/UL — SIGNIFICANT CHANGE UP (ref 0–0.9)
MONOCYTES NFR BLD AUTO: 8.1 % — SIGNIFICANT CHANGE UP (ref 2–14)
NEUTROPHILS # BLD AUTO: 2.24 K/UL — SIGNIFICANT CHANGE UP (ref 1.8–7.4)
NEUTROPHILS NFR BLD AUTO: 56.4 % — SIGNIFICANT CHANGE UP (ref 43–77)
NRBC # BLD: 0 /100 WBCS — SIGNIFICANT CHANGE UP (ref 0–0)
OVALOCYTES BLD QL SMEAR: SLIGHT — SIGNIFICANT CHANGE UP
PLAT MORPH BLD: NORMAL — SIGNIFICANT CHANGE UP
PLATELET # BLD AUTO: 204 K/UL — SIGNIFICANT CHANGE UP (ref 150–400)
POTASSIUM SERPL-MCNC: 3.3 MMOL/L — LOW (ref 3.5–5.3)
POTASSIUM SERPL-SCNC: 3.3 MMOL/L — LOW (ref 3.5–5.3)
PROT SERPL-MCNC: 7.9 GM/DL — SIGNIFICANT CHANGE UP (ref 6–8.3)
PROTHROM AB SERPL-ACNC: 13.5 SEC — HIGH (ref 10.5–13.4)
RBC # BLD: 4.94 M/UL — SIGNIFICANT CHANGE UP (ref 4.2–5.8)
RBC # FLD: 21.8 % — HIGH (ref 10.3–14.5)
RBC BLD AUTO: ABNORMAL
SARS-COV-2 RNA SPEC QL NAA+PROBE: SIGNIFICANT CHANGE UP
SODIUM SERPL-SCNC: 147 MMOL/L — HIGH (ref 135–145)
WBC # BLD: 3.97 K/UL — SIGNIFICANT CHANGE UP (ref 3.8–10.5)
WBC # FLD AUTO: 3.97 K/UL — SIGNIFICANT CHANGE UP (ref 3.8–10.5)

## 2022-11-05 PROCEDURE — 99284 EMERGENCY DEPT VISIT MOD MDM: CPT

## 2022-11-05 RX ORDER — ACETAMINOPHEN 500 MG
1000 TABLET ORAL ONCE
Refills: 0 | Status: COMPLETED | OUTPATIENT
Start: 2022-11-05 | End: 2022-11-05

## 2022-11-05 RX ORDER — ONDANSETRON 8 MG/1
8 TABLET, FILM COATED ORAL ONCE
Refills: 0 | Status: COMPLETED | OUTPATIENT
Start: 2022-11-05 | End: 2022-11-05

## 2022-11-05 RX ORDER — THIAMINE MONONITRATE (VIT B1) 100 MG
500 TABLET ORAL ONCE
Refills: 0 | Status: COMPLETED | OUTPATIENT
Start: 2022-11-05 | End: 2022-11-05

## 2022-11-05 RX ORDER — OXYCODONE AND ACETAMINOPHEN 5; 325 MG/1; MG/1
2 TABLET ORAL ONCE
Refills: 0 | Status: DISCONTINUED | OUTPATIENT
Start: 2022-11-05 | End: 2022-11-05

## 2022-11-05 RX ORDER — FAMOTIDINE 10 MG/ML
20 INJECTION INTRAVENOUS ONCE
Refills: 0 | Status: COMPLETED | OUTPATIENT
Start: 2022-11-05 | End: 2022-11-05

## 2022-11-05 RX ORDER — SODIUM CHLORIDE 9 MG/ML
1000 INJECTION, SOLUTION INTRAVENOUS ONCE
Refills: 0 | Status: COMPLETED | OUTPATIENT
Start: 2022-11-05 | End: 2022-11-05

## 2022-11-05 RX ORDER — PHENOBARBITAL 60 MG
260 TABLET ORAL ONCE
Refills: 0 | Status: DISCONTINUED | OUTPATIENT
Start: 2022-11-05 | End: 2022-11-05

## 2022-11-05 RX ORDER — POTASSIUM CHLORIDE 20 MEQ
40 PACKET (EA) ORAL ONCE
Refills: 0 | Status: COMPLETED | OUTPATIENT
Start: 2022-11-05 | End: 2022-11-05

## 2022-11-05 RX ORDER — MORPHINE SULFATE 50 MG/1
4 CAPSULE, EXTENDED RELEASE ORAL ONCE
Refills: 0 | Status: DISCONTINUED | OUTPATIENT
Start: 2022-11-05 | End: 2022-11-05

## 2022-11-05 RX ADMIN — OXYCODONE AND ACETAMINOPHEN 2 TABLET(S): 5; 325 TABLET ORAL at 08:06

## 2022-11-05 RX ADMIN — SODIUM CHLORIDE 1000 MILLILITER(S): 9 INJECTION, SOLUTION INTRAVENOUS at 08:06

## 2022-11-05 RX ADMIN — SODIUM CHLORIDE 1000 MILLILITER(S): 9 INJECTION, SOLUTION INTRAVENOUS at 06:42

## 2022-11-05 RX ADMIN — Medication 260 MILLIGRAM(S): at 02:40

## 2022-11-05 RX ADMIN — MORPHINE SULFATE 4 MILLIGRAM(S): 50 CAPSULE, EXTENDED RELEASE ORAL at 02:40

## 2022-11-05 RX ADMIN — Medication 105 MILLIGRAM(S): at 03:09

## 2022-11-05 RX ADMIN — Medication 40 MILLIEQUIVALENT(S): at 06:35

## 2022-11-05 RX ADMIN — Medication 1000 MILLIGRAM(S): at 08:06

## 2022-11-05 RX ADMIN — Medication 400 MILLIGRAM(S): at 02:53

## 2022-11-05 RX ADMIN — OXYCODONE AND ACETAMINOPHEN 2 TABLET(S): 5; 325 TABLET ORAL at 06:35

## 2022-11-05 RX ADMIN — ONDANSETRON 8 MILLIGRAM(S): 8 TABLET, FILM COATED ORAL at 02:36

## 2022-11-05 RX ADMIN — MORPHINE SULFATE 4 MILLIGRAM(S): 50 CAPSULE, EXTENDED RELEASE ORAL at 08:06

## 2022-11-05 RX ADMIN — FAMOTIDINE 20 MILLIGRAM(S): 10 INJECTION INTRAVENOUS at 02:38

## 2022-11-05 RX ADMIN — Medication 500 MILLIGRAM(S): at 08:06

## 2022-11-05 NOTE — ED PROVIDER NOTE - PHYSICAL EXAMINATION
Gen: Alert, in pain  Head: NC, AT   Eyes: PERRL, EOMI, normal lids/conjunctiva  ENT: normal hearing, patent oropharynx without erythema/exudate, uvula midline  Neck: supple, no tenderness, Trachea midline  Pulm: Bilateral BS, normal resp effort, no wheeze/stridor/retractions  CV: RRR, no M/R/G, 2+ radial and dp pulses bl, no edema  Abd: soft, NT/ND, +BS, no hepatosplenomegaly. spitting up.   Mskel: extremities x4 with normal ROM and no joint effusions. no ctl spine ttp.   Skin: no rash, no bruising   Neuro: AAOx3, no sensory/motor deficits, CN 2-12 intact

## 2022-11-05 NOTE — ED PROVIDER NOTE - OBJECTIVE STATEMENT
57m hx of etoh use disorder pw abd pain, nausea, vomiting. he denies drinking etoh, says he was drinking juice. he complains of abd pain periumbilical and associated with a lot of pain. he does not want to talk further.

## 2022-11-05 NOTE — ED ADULT NURSE NOTE - CAS DISCH BELONGINGS RETURNED
several cell phone calls to family for PU, family speaking to patient on cell phone but he told me they were sleeping/Yes

## 2022-11-05 NOTE — ED ADULT NURSE NOTE - PAIN RATING/NUMBER SCALE (0-10): ACTIVITY
Patient called requesting a nurse appt to check her b/p.  Patient states that she took her b/p in [facility] at one of the machines and it was 147/79 .  Spoke with Dr. Skaggs states ok to schedule nurse appt   appt scheduled.   8

## 2022-11-05 NOTE — ED PROVIDER NOTE - PATIENT PORTAL LINK FT
You can access the FollowMyHealth Patient Portal offered by Health system by registering at the following website: [email or web address]. By joining [employer]’s FollowMyHealth portal, you will also be able to view your health information using other applications (apps) compatible with our system.

## 2022-11-05 NOTE — ED ADULT NURSE NOTE - OBJECTIVE STATEMENT
Received pt in the ED from triage, aox3. C/o abd pain of periumbilical area. Pt denied drinking today, cp, sob, n/v/d. Pt is asking pain medication, not answering further questions. Speaks full sentences, unlabored breathing on RA. Ambulatory with steat gait. PMH of alcohol abuse.

## 2022-11-07 NOTE — DIETITIAN INITIAL EVALUATION ADULT. - REASON FOR ADMISSION
Physical Therapy Evaluation    Visit Type: Initial Evaluation  Visit: 1  Referring Provider: Juan Perez MD  Next referring provider visit: 11/22/2022  Medical Diagnosis (from order): Diagnosis Information    Diagnosis  844.8 (ICD-9-CM) - S86.112D (ICD-10-CM) - Strain of other muscle(s) and tendon(s) of posterior muscle group at lower leg level, left leg, subsequent encounter       Treatment Diagnosis: left knee, LLE with increased pain/symptoms, impaired range of motion, impaired muscle length/flexibility, impaired tissue/wound healing, impaired mobility, impaired gait, impaired strength, increased risk for falls, impaired activity tolerance, impaired balance and impaired motor function/performance/coordination.  Onset  - Date of onset: 10/7/2022  - Date of Surgery:  No surgery found  - Procedure: No surgery found  Patient alert and oriented X3.  Chart reviewed at time of initial evaluation (relevant co-morbidities, allergies, tests and medications listed):   Chart reviewed, medical and surgical history reviewed, and allergies reviewed            SUBJECTIVE                                                                                                               On 10/7/22, the patient was in karate and doing a kick with left foot when she felt a pop in her left leg.  She was unable to bear weight and went to the ED.  The patient was given crutches and soft wrap of the leg.  The patient went to see orthopedic and Xray showed no fracture.  The patient was diagnosed with soft tissue injury with a partial tear of gastroc.  She was given tall CAM boot and uses crutches occasionally.  The patient was cleared to trial weightbearing at home without boot.  The patient reports difficulty with walking, standing and stairs.  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  The patient reports she would like to improve strength and mobility.      Pain / Symptoms  - Pain/symptom is: intermittent  - Pain rating (out of  10): Current: 1 ; Best: 0; Worst: 3  - Location: Left lower leg  - Quality / Description: cramping, tight  - Alleviating Factors: avoiding movement in involved area, rest  - Progression since onset: improved    Function:   Limitations / Exacerbation Factors:   - Patient reports pain and difficulty with function reported below.  - lower body dressing, sleep disturbed, driving/riding in a vehicle, house/yard work, grocery shopping, standing tasks, work - limited or modified, bending/squatting/lifting, standing and walking, community distances, stairs, walking quickly as required to cross a street/exit a building rapidly, now needs to use assistive device  - Walking with CAM boot at work  Prior Level of Function: pain free ADLs and IADLs. no limitation in involved extremity,    Patient Goals: decreased pain, increased motion, increased strength and independence with ADLs/IADLs.    Prior treatment  - no therapies  - Discharged from hospital, home health, or skilled nursing facility in last 30 days: no  Home Environment   - Patient lives with: significant other  - Type of home: multiple level home  - Assistance available: as needed  - Denies 2 or more falls or an unexplained fall with injury in the last year.  - Feel safe at home / work / school: yes      OBJECTIVE                                                                                                                    Posture:  - Seated: fair  - Standing: fair  - Correction of posture: makes symptoms better  Cervical: forward head      Range of Motion (ROM)   (degrees unless noted; active unless noted; norms in ( ); negative=lacking to 0, positive=beyond 0)  WNL: RLE  WFL: RLE  Knee:   - Flexion (150):      • Left:  WFL   - Extension (0-10):      • Left:  WFL  Ankle:   - Dorsiflexion (20):      • Left:  0  Pain passive: 2    - Plantar Flexion (45-50):      • Left:  30  Pain passive: 35   - Inversion (30-35):     • Left: pain 15 passive: 20   - Eversion  (15-20):     • Left: pain 10 passive: 12    Strength  (out of 5 unless noted, standard test position unless noted)   WFL: RLE  Ankle:    - Dorsiflexion:        • Left: pain, 3-    - Plantar Flexion:        • Left: pain, 3-    - Inversion:        • Left: pain, 3-     - Eversion:        • Left: pain, 3-          Palpation  Left  - Lateral Gastrocnemius: no palpable tenderness  - Medial Gastrocnemius: tenderness  - [place]: tenderness  Knee: [place]/Tendon/Bone  - Medial Joint Line: - Left: no tenderness  - Lateral Joint Line: - Left: no tenderness    Muscle Flexibility  - Knee Flexors: • Left: moderate limitation  - Gastroc Soleus: • Left: moderate limitation            Ambulation / Gait  - Assistive device: no assistive device  - Assist Level: independent  - Surface: even  - Description: antalgic and decreased step length left          Outcome/Assessments    Outcome Measures:   Lower Extremity Functional Scale: LEFS Calculated Total: 41 (0=extreme difficulty; 80=no difficulty) see flowsheet for additional documentation        Treatment     Therapeutic Exercise  HEP performed and copy of exercises given    Skilled input: verbal instruction/cues and tactile instruction/cues    Writer verbally educated and received verbal consent for hand placement, positioning of patient, and techniques to be performed today from patient for hand placement and palpation for techniques and therapist position for techniques as described above and how they are pertinent to the patient's plan of care.    Home Exercise Program  Access Code: [email or web address]  URL: [email or web address]  Date: 11/08/2022  Prepared by: Julianne Mendiola    Exercises  · Supine Ankle Pumps - 2 x daily - 7 x weekly - 2 sets - 10 reps  · Supine Heel Slide - 2 x daily - 7 x weekly - 2 sets - 10 reps  · Ankle Inversion Eversion Towel Slide - 2 x daily - 7 x weekly - 2 sets - 10 reps  · Seated Long Arc Quad - 2 x daily - 7 x weekly - 2 sets - 10  reps  · Ankle Inversion Eversion Towel Slide - 2 x daily - 7 x weekly - 2 sets - 10 reps  · Seated Ankle Circles - 2 x daily - 7 x weekly - 2 sets - 10 reps  · Towel Scrunches - 2 x daily - 7 x weekly - 2 sets - 10 reps  · Seated Ankle Alphabet - 2 x daily - 7 x weekly - 2 sets - 10 reps  · Seated Heel Raise - 2 x daily - 7 x weekly - 2 sets - 10 reps  · Seated Toe Raise - 2 x daily - 7 x weekly - 2 sets - 10 reps  · Seated Toe Curl - 2 x daily - 7 x weekly - 2 sets - 10 reps               ASSESSMENT                                                                                                          45 year old patient has reported functional limitations listed above impacted by signs and symptoms consistent with treatment diagnosis below.  Treatment Diagnosis:   - Involved: left knee, LLE.  - Symptoms/impairments: increased pain/symptoms, impaired range of motion, impaired muscle length/flexibility, impaired tissue/wound healing, impaired mobility, impaired gait, impaired strength, increased risk for falls, impaired activity tolerance, impaired balance and impaired motor function/performance/coordination.    The patient presents with knee pain secondary to strain of other muscle(s) and tendon(s) of posterior muscle group at lower leg level, left leg with soft tissue injury of medial gastroc.  The patient presents with deficits in knee ROM, LE strength, gait, balance and pain.  The patient is limited in transfers, walking, standing tolerance, and stairs.  The patient would benefit from physical therapy to address deficits, decrease pain and gain independence with home exercise program.      Prognosis: Patient will benefit from skilled therapy.  Rehabilitative potential is: good.  Predicted patient presentation: Low (stable) - Patient comorbidities and complexities, as defined above, will have little effect on progress for prescribed plan of care.    Education:   - Who will be receiving education: patient  - Are  they ready to learn: yes  - Preferred learning style: written  - Barriers to learning: no barriers apparent at this time  - Results of above outlined education: Verbalizes understanding and Demonstrates understanding    PLAN                                                                                                                         The following skilled interventions to be implemented to achieve goals listed below:  Neuromuscular Re-Education (67343)  Therapeutic Activity (80127)  Therapeutic Exercise (27449)  Manual Therapy (65206)  Gait Training (82351)  Electrical Stimulation Unattended (11294 or )  Heat/Cold (22692)  Aquatic Therapy (99800)    Frequency / Duration  2 times per week tapering as patient progresses for 4 weeks for an estimated total of 8 visits    Patient involved in and agreed to plan of care and goals.  Patient given attendance policy at time of initial evaluation.    Suggestions for next session as indicated: Progress per plan of care for ROM and strengthening     Goals  Decrease pain/symptoms to 2/10 or less due to pain.  Improve involved strength to 4+/5 in the ankle.  Improve involved ROM to 0 Dorsiflexion, 40 Plantarflexion, and 20 Inversion/Eversion.    The above improvements in impairments to assist in obtaining goals listed below  Long Term Goals: to be met by end of plan of care  1. Patient will ambulate 500 feet without device or with least restrictive device.  2. Patient will ascend and descend 10 steps and without hand rail for completion of household tasks such as laundry and cleaning.    3. Patient will complete single leg stance for 10 seconds without loss of balance for safe lower body dressing.  4. Patient will demonstrate ability to negotiate level and unlevel surfaces at variable velocities, including change of direction without increased pain or instability to return to age appropriate and community activities at prior level of function.  5. Lower Extremity  ETOH withdrawal and ROSA from dehydration. Functional Scale: Patient will complete form to reflect an improved raw score to greater than or equal to 60/80 to indicate patient reported improvement in function/disability/impairment (minimal detectable change: 9 points).  6. Patient will be independent with progressed and modified home exercise program.      Therapy procedure time and total treatment time can be found documented on the Time Entry flowsheet

## 2022-11-09 NOTE — DIETITIAN INITIAL EVALUATION ADULT. - PERTINENT MEDS FT
Ok noted will disregard order for oxygen.    MEDICATIONS  (STANDING):  chlorhexidine 4% Liquid 1 Application(s) Topical <User Schedule>  dexmedetomidine Infusion 0.2 MICROgram(s)/kG/Hr (3.6 mL/Hr) IV Continuous <Continuous>  heparin  Injectable 5000 Unit(s) SubCutaneous every 8 hours  multivitamin/thiamine/folic acid in sodium chloride 0.9% 1000 milliLiter(s) (42 mL/Hr) IV Continuous <Continuous>  pantoprazole  Injectable 40 milliGRAM(s) IV Push every 12 hours  PHENobarbital Injectable 130 milliGRAM(s) IV Push every 6 hours  risperiDONE   Tablet 1 milliGRAM(s) Oral two times a day  sucralfate suspension 1 Gram(s) Oral four times a day    MEDICATIONS  (PRN):  acetaminophen   Tablet .. 650 milliGRAM(s) Oral every 6 hours PRN Temp greater or equal to 38C (100.4F), Moderate Pain (4 - 6)  HYDROmorphone  Injectable 0.5 milliGRAM(s) IV Push every 4 hours PRN Severe Pain (7 - 10)  PHENobarbital Injectable 130 milliGRAM(s) IV Push every 15 minutes PRN for CIWA > 12  PHENobarbital Injectable 260 milliGRAM(s) IV Push every 15 minutes PRN ciwa > 20

## 2022-11-14 ENCOUNTER — INPATIENT (INPATIENT)
Facility: HOSPITAL | Age: 57
LOS: 1 days | Discharge: HOME | End: 2022-11-16
Attending: HOSPITALIST | Admitting: HOSPITALIST

## 2022-11-14 VITALS
TEMPERATURE: 98 F | WEIGHT: 160.06 LBS | OXYGEN SATURATION: 100 % | DIASTOLIC BLOOD PRESSURE: 92 MMHG | HEIGHT: 67 IN | SYSTOLIC BLOOD PRESSURE: 133 MMHG | RESPIRATION RATE: 20 BRPM | HEART RATE: 130 BPM

## 2022-11-14 LAB
ALBUMIN SERPL ELPH-MCNC: 3.4 G/DL — SIGNIFICANT CHANGE UP (ref 3.3–5)
ALP SERPL-CCNC: 79 U/L — SIGNIFICANT CHANGE UP (ref 40–120)
ALT FLD-CCNC: 63 U/L — SIGNIFICANT CHANGE UP (ref 12–78)
ANION GAP SERPL CALC-SCNC: 22 MMOL/L — HIGH (ref 5–17)
AST SERPL-CCNC: 87 U/L — HIGH (ref 15–37)
BASOPHILS # BLD AUTO: 0.06 K/UL — SIGNIFICANT CHANGE UP (ref 0–0.2)
BASOPHILS NFR BLD AUTO: 2.1 % — HIGH (ref 0–2)
BILIRUB SERPL-MCNC: 0.4 MG/DL — SIGNIFICANT CHANGE UP (ref 0.2–1.2)
BUN SERPL-MCNC: 13 MG/DL — SIGNIFICANT CHANGE UP (ref 7–23)
CALCIUM SERPL-MCNC: 9.5 MG/DL — SIGNIFICANT CHANGE UP (ref 8.5–10.1)
CHLORIDE SERPL-SCNC: 96 MMOL/L — SIGNIFICANT CHANGE UP (ref 96–108)
CO2 SERPL-SCNC: 24 MMOL/L — SIGNIFICANT CHANGE UP (ref 22–31)
CREAT SERPL-MCNC: 1.42 MG/DL — HIGH (ref 0.5–1.3)
EGFR: 58 ML/MIN/1.73M2 — LOW
EOSINOPHIL # BLD AUTO: 0.01 K/UL — SIGNIFICANT CHANGE UP (ref 0–0.5)
EOSINOPHIL NFR BLD AUTO: 0.4 % — SIGNIFICANT CHANGE UP (ref 0–6)
FLUAV AG NPH QL: SIGNIFICANT CHANGE UP
FLUBV AG NPH QL: SIGNIFICANT CHANGE UP
GLUCOSE SERPL-MCNC: 105 MG/DL — HIGH (ref 70–99)
HCT VFR BLD CALC: 40.9 % — SIGNIFICANT CHANGE UP (ref 39–50)
HGB BLD-MCNC: 12.9 G/DL — LOW (ref 13–17)
IMM GRANULOCYTES NFR BLD AUTO: 0 % — SIGNIFICANT CHANGE UP (ref 0–0.9)
LACTATE SERPL-SCNC: 13.7 MMOL/L — CRITICAL HIGH (ref 0.7–2)
LACTATE SERPL-SCNC: 6.9 MMOL/L — CRITICAL HIGH (ref 0.7–2)
LACTATE SERPL-SCNC: 9.4 MMOL/L — CRITICAL HIGH (ref 0.7–2)
LIDOCAIN IGE QN: 230 U/L — SIGNIFICANT CHANGE UP (ref 73–393)
LYMPHOCYTES # BLD AUTO: 1.23 K/UL — SIGNIFICANT CHANGE UP (ref 1–3.3)
LYMPHOCYTES # BLD AUTO: 43.2 % — SIGNIFICANT CHANGE UP (ref 13–44)
MCHC RBC-ENTMCNC: 23.4 PG — LOW (ref 27–34)
MCHC RBC-ENTMCNC: 31.5 G/DL — LOW (ref 32–36)
MCV RBC AUTO: 74.2 FL — LOW (ref 80–100)
MONOCYTES # BLD AUTO: 0.4 K/UL — SIGNIFICANT CHANGE UP (ref 0–0.9)
MONOCYTES NFR BLD AUTO: 14 % — SIGNIFICANT CHANGE UP (ref 2–14)
NEUTROPHILS # BLD AUTO: 1.15 K/UL — LOW (ref 1.8–7.4)
NEUTROPHILS NFR BLD AUTO: 40.3 % — LOW (ref 43–77)
NRBC # BLD: 0 /100 WBCS — SIGNIFICANT CHANGE UP (ref 0–0)
PLATELET # BLD AUTO: 227 K/UL — SIGNIFICANT CHANGE UP (ref 150–400)
POTASSIUM SERPL-MCNC: 3.9 MMOL/L — SIGNIFICANT CHANGE UP (ref 3.5–5.3)
POTASSIUM SERPL-SCNC: 3.9 MMOL/L — SIGNIFICANT CHANGE UP (ref 3.5–5.3)
PROT SERPL-MCNC: 8.4 GM/DL — HIGH (ref 6–8.3)
RBC # BLD: 5.51 M/UL — SIGNIFICANT CHANGE UP (ref 4.2–5.8)
RBC # FLD: 21.6 % — HIGH (ref 10.3–14.5)
SARS-COV-2 RNA SPEC QL NAA+PROBE: SIGNIFICANT CHANGE UP
SODIUM SERPL-SCNC: 142 MMOL/L — SIGNIFICANT CHANGE UP (ref 135–145)
WBC # BLD: 2.85 K/UL — LOW (ref 3.8–10.5)
WBC # FLD AUTO: 2.85 K/UL — LOW (ref 3.8–10.5)

## 2022-11-14 PROCEDURE — 74177 CT ABD & PELVIS W/CONTRAST: CPT | Mod: 26,MA

## 2022-11-14 PROCEDURE — 99285 EMERGENCY DEPT VISIT HI MDM: CPT

## 2022-11-14 RX ORDER — SODIUM CHLORIDE 9 MG/ML
2000 INJECTION, SOLUTION INTRAVENOUS ONCE
Refills: 0 | Status: COMPLETED | OUTPATIENT
Start: 2022-11-14 | End: 2022-11-14

## 2022-11-14 RX ORDER — DIAZEPAM 5 MG
10 TABLET ORAL ONCE
Refills: 0 | Status: DISCONTINUED | OUTPATIENT
Start: 2022-11-14 | End: 2022-11-14

## 2022-11-14 RX ORDER — SODIUM CHLORIDE 9 MG/ML
1000 INJECTION INTRAMUSCULAR; INTRAVENOUS; SUBCUTANEOUS ONCE
Refills: 0 | Status: COMPLETED | OUTPATIENT
Start: 2022-11-14 | End: 2022-11-14

## 2022-11-14 RX ORDER — FAMOTIDINE 10 MG/ML
20 INJECTION INTRAVENOUS ONCE
Refills: 0 | Status: COMPLETED | OUTPATIENT
Start: 2022-11-14 | End: 2022-11-14

## 2022-11-14 RX ORDER — ONDANSETRON 8 MG/1
4 TABLET, FILM COATED ORAL ONCE
Refills: 0 | Status: COMPLETED | OUTPATIENT
Start: 2022-11-14 | End: 2022-11-14

## 2022-11-14 RX ORDER — MORPHINE SULFATE 50 MG/1
4 CAPSULE, EXTENDED RELEASE ORAL ONCE
Refills: 0 | Status: DISCONTINUED | OUTPATIENT
Start: 2022-11-14 | End: 2022-11-14

## 2022-11-14 RX ORDER — SODIUM CHLORIDE 9 MG/ML
1000 INJECTION INTRAMUSCULAR; INTRAVENOUS; SUBCUTANEOUS ONCE
Refills: 0 | Status: DISCONTINUED | OUTPATIENT
Start: 2022-11-14 | End: 2022-11-14

## 2022-11-14 RX ORDER — METOCLOPRAMIDE HCL 10 MG
10 TABLET ORAL ONCE
Refills: 0 | Status: COMPLETED | OUTPATIENT
Start: 2022-11-14 | End: 2022-11-14

## 2022-11-14 RX ADMIN — SODIUM CHLORIDE 2000 MILLILITER(S): 9 INJECTION, SOLUTION INTRAVENOUS at 21:13

## 2022-11-14 RX ADMIN — FAMOTIDINE 20 MILLIGRAM(S): 10 INJECTION INTRAVENOUS at 16:50

## 2022-11-14 RX ADMIN — Medication 50 MILLIGRAM(S): at 16:57

## 2022-11-14 RX ADMIN — ONDANSETRON 4 MILLIGRAM(S): 8 TABLET, FILM COATED ORAL at 16:49

## 2022-11-14 RX ADMIN — Medication 2 MILLIGRAM(S): at 16:49

## 2022-11-14 RX ADMIN — Medication 10 MILLIGRAM(S): at 21:50

## 2022-11-14 RX ADMIN — MORPHINE SULFATE 4 MILLIGRAM(S): 50 CAPSULE, EXTENDED RELEASE ORAL at 20:27

## 2022-11-14 RX ADMIN — Medication 10 MILLIGRAM(S): at 20:28

## 2022-11-14 RX ADMIN — Medication 2 MILLIGRAM(S): at 20:28

## 2022-11-14 RX ADMIN — SODIUM CHLORIDE 1000 MILLILITER(S): 9 INJECTION INTRAMUSCULAR; INTRAVENOUS; SUBCUTANEOUS at 16:54

## 2022-11-14 RX ADMIN — MORPHINE SULFATE 4 MILLIGRAM(S): 50 CAPSULE, EXTENDED RELEASE ORAL at 16:49

## 2022-11-14 NOTE — ED PROVIDER NOTE - OBJECTIVE STATEMENT
56yo male with pmh etoh abuse, pud/ gastritis, presenting with diffuse abdominal pain, vomiting.  Has had this before.  Drank this morning but has been vomiting since 2/2 pain.  No fevers.  No pshx.  Did not take anything for pain.

## 2022-11-14 NOTE — ED PROVIDER NOTE - NSICDXFAMILYHX_GEN_ALL_CORE_FT
In order to meet Medicare requirements, the clinical documentation must support the information cited in the admission order.  Please be sure to provide detailed and clear documentation about the following in the admitting note/history and physical: FAMILY HISTORY:  FH: HTN (hypertension)

## 2022-11-14 NOTE — ED ADULT NURSE NOTE - OBJECTIVE STATEMENT
Pt presents to the ED c/o 10/10 abdominal pain with n/v. Pt is a frequent etoh pt here. States last drink was this morning. Denies  sob, diff breathing, fever, cp, dizziness

## 2022-11-14 NOTE — ED PROVIDER NOTE - CLINICAL SUMMARY MEDICAL DECISION MAKING FREE TEXT BOX
Presenting with diffuse abdominal pain, vomiting.  Hx etoh abuse with withdrawals and tremulous here, will treat for withdrawal.  Likely 2/2 gastritis/ pud, will medicate, ct, reassess.

## 2022-11-14 NOTE — ED PROVIDER NOTE - PHYSICAL EXAMINATION
General appearance: Nontoxic but uncomfortable appearing, conversant, afebrile    Eyes: anicteric sclerae, NUBIA, EOMI   HENT: Atraumatic; oropharynx clear, MMM and no ulcerations, no pharyngeal erythema or exudate, + fasciculations   Neck: Trachea midline; Full range of motion, supple   Pulm: CTA bl, normal respiratory effort and no intercostal retractions, normal work of breathing   CV: RRR, No murmurs, rubs, or gallops   Abdomen: Soft, diffusely tender, non-distended; no guarding or rebound   Extremities: No peripheral edema, no gross deformities, FROM x4, + tremors   Skin: Dry, normal temperature, turgor and texture; no rash   Psych: Appropriate affect, cooperative

## 2022-11-14 NOTE — ED PROVIDER NOTE - PROGRESS NOTE DETAILS
Conrad: Lactate downtrending, CT unremarkable.  Patient resting comfortably after meds.  Prior to valium patient very tremulous and +fasciculations.  Now improved.  Will admit for inability to tolerate po, etoh withdrawal.

## 2022-11-14 NOTE — ED ADULT TRIAGE NOTE - CHIEF COMPLAINT QUOTE
BIBA as per patient c/o abdominal pain and difficulty tolerating po intake, last alcoholic drink this morning.

## 2022-11-14 NOTE — ED PROVIDER NOTE - CARE PLAN
1 Principal Discharge DX:	Generalized abdominal pain  Secondary Diagnosis:	Alcohol dependence with withdrawal

## 2022-11-15 DIAGNOSIS — R11.2 NAUSEA WITH VOMITING, UNSPECIFIED: ICD-10-CM

## 2022-11-15 DIAGNOSIS — F10.239 ALCOHOL DEPENDENCE WITH WITHDRAWAL, UNSPECIFIED: ICD-10-CM

## 2022-11-15 DIAGNOSIS — N17.9 ACUTE KIDNEY FAILURE, UNSPECIFIED: ICD-10-CM

## 2022-11-15 LAB
ALBUMIN SERPL ELPH-MCNC: 2.9 G/DL — LOW (ref 3.3–5)
ALP SERPL-CCNC: 61 U/L — SIGNIFICANT CHANGE UP (ref 40–120)
ALT FLD-CCNC: 46 U/L — SIGNIFICANT CHANGE UP (ref 12–78)
ANION GAP SERPL CALC-SCNC: 6 MMOL/L — SIGNIFICANT CHANGE UP (ref 5–17)
AST SERPL-CCNC: 54 U/L — HIGH (ref 15–37)
BILIRUB DIRECT SERPL-MCNC: 0.3 MG/DL — SIGNIFICANT CHANGE UP (ref 0–0.3)
BILIRUB INDIRECT FLD-MCNC: 0.4 MG/DL — SIGNIFICANT CHANGE UP (ref 0.2–1)
BILIRUB SERPL-MCNC: 0.7 MG/DL — SIGNIFICANT CHANGE UP (ref 0.2–1.2)
BUN SERPL-MCNC: 11 MG/DL — SIGNIFICANT CHANGE UP (ref 7–23)
CALCIUM SERPL-MCNC: 8.1 MG/DL — LOW (ref 8.5–10.1)
CHLORIDE SERPL-SCNC: 105 MMOL/L — SIGNIFICANT CHANGE UP (ref 96–108)
CO2 SERPL-SCNC: 30 MMOL/L — SIGNIFICANT CHANGE UP (ref 22–31)
CREAT SERPL-MCNC: 0.94 MG/DL — SIGNIFICANT CHANGE UP (ref 0.5–1.3)
EGFR: 96 ML/MIN/1.73M2 — SIGNIFICANT CHANGE UP
GLUCOSE SERPL-MCNC: 87 MG/DL — SIGNIFICANT CHANGE UP (ref 70–99)
HCT VFR BLD CALC: 33.3 % — LOW (ref 39–50)
HGB BLD-MCNC: 10.1 G/DL — LOW (ref 13–17)
LACTATE SERPL-SCNC: 3.6 MMOL/L — HIGH (ref 0.7–2)
LACTATE SERPL-SCNC: 4.5 MMOL/L — CRITICAL HIGH (ref 0.7–2)
MAGNESIUM SERPL-MCNC: 1.8 MG/DL — SIGNIFICANT CHANGE UP (ref 1.6–2.6)
MCHC RBC-ENTMCNC: 23.3 PG — LOW (ref 27–34)
MCHC RBC-ENTMCNC: 30.3 G/DL — LOW (ref 32–36)
MCV RBC AUTO: 76.9 FL — LOW (ref 80–100)
NRBC # BLD: 0 /100 WBCS — SIGNIFICANT CHANGE UP (ref 0–0)
PHOSPHATE SERPL-MCNC: 1.9 MG/DL — LOW (ref 2.5–4.5)
PLATELET # BLD AUTO: 146 K/UL — LOW (ref 150–400)
POTASSIUM SERPL-MCNC: 3.2 MMOL/L — LOW (ref 3.5–5.3)
POTASSIUM SERPL-SCNC: 3.2 MMOL/L — LOW (ref 3.5–5.3)
PROT SERPL-MCNC: 6.4 GM/DL — SIGNIFICANT CHANGE UP (ref 6–8.3)
RBC # BLD: 4.33 M/UL — SIGNIFICANT CHANGE UP (ref 4.2–5.8)
RBC # FLD: 20.9 % — HIGH (ref 10.3–14.5)
SODIUM SERPL-SCNC: 141 MMOL/L — SIGNIFICANT CHANGE UP (ref 135–145)
WBC # BLD: 2.5 K/UL — LOW (ref 3.8–10.5)
WBC # FLD AUTO: 2.5 K/UL — LOW (ref 3.8–10.5)

## 2022-11-15 PROCEDURE — 99223 1ST HOSP IP/OBS HIGH 75: CPT

## 2022-11-15 PROCEDURE — 99233 SBSQ HOSP IP/OBS HIGH 50: CPT

## 2022-11-15 RX ORDER — OXYCODONE HYDROCHLORIDE 5 MG/1
5 TABLET ORAL EVERY 6 HOURS
Refills: 0 | Status: DISCONTINUED | OUTPATIENT
Start: 2022-11-15 | End: 2022-11-16

## 2022-11-15 RX ORDER — ONDANSETRON 8 MG/1
4 TABLET, FILM COATED ORAL EVERY 8 HOURS
Refills: 0 | Status: DISCONTINUED | OUTPATIENT
Start: 2022-11-15 | End: 2022-11-16

## 2022-11-15 RX ORDER — PANTOPRAZOLE SODIUM 20 MG/1
40 TABLET, DELAYED RELEASE ORAL
Refills: 0 | Status: DISCONTINUED | OUTPATIENT
Start: 2022-11-15 | End: 2022-11-16

## 2022-11-15 RX ORDER — DEXTROSE MONOHYDRATE, SODIUM CHLORIDE, AND POTASSIUM CHLORIDE 50; .745; 4.5 G/1000ML; G/1000ML; G/1000ML
1000 INJECTION, SOLUTION INTRAVENOUS
Refills: 0 | Status: DISCONTINUED | OUTPATIENT
Start: 2022-11-15 | End: 2022-11-16

## 2022-11-15 RX ORDER — SODIUM CHLORIDE 9 MG/ML
1000 INJECTION INTRAMUSCULAR; INTRAVENOUS; SUBCUTANEOUS
Refills: 0 | Status: DISCONTINUED | OUTPATIENT
Start: 2022-11-15 | End: 2022-11-15

## 2022-11-15 RX ORDER — LANOLIN ALCOHOL/MO/W.PET/CERES
3 CREAM (GRAM) TOPICAL AT BEDTIME
Refills: 0 | Status: DISCONTINUED | OUTPATIENT
Start: 2022-11-15 | End: 2022-11-16

## 2022-11-15 RX ORDER — SUCRALFATE 1 G
1 TABLET ORAL
Refills: 0 | Status: DISCONTINUED | OUTPATIENT
Start: 2022-11-15 | End: 2022-11-16

## 2022-11-15 RX ORDER — FOLIC ACID 0.8 MG
1 TABLET ORAL DAILY
Refills: 0 | Status: DISCONTINUED | OUTPATIENT
Start: 2022-11-15 | End: 2022-11-16

## 2022-11-15 RX ORDER — ENOXAPARIN SODIUM 100 MG/ML
40 INJECTION SUBCUTANEOUS EVERY 24 HOURS
Refills: 0 | Status: DISCONTINUED | OUTPATIENT
Start: 2022-11-15 | End: 2022-11-16

## 2022-11-15 RX ORDER — THIAMINE MONONITRATE (VIT B1) 100 MG
100 TABLET ORAL DAILY
Refills: 0 | Status: DISCONTINUED | OUTPATIENT
Start: 2022-11-15 | End: 2022-11-16

## 2022-11-15 RX ORDER — POTASSIUM CHLORIDE 20 MEQ
40 PACKET (EA) ORAL EVERY 4 HOURS
Refills: 0 | Status: COMPLETED | OUTPATIENT
Start: 2022-11-15 | End: 2022-11-15

## 2022-11-15 RX ORDER — SODIUM,POTASSIUM PHOSPHATES 278-250MG
2 POWDER IN PACKET (EA) ORAL
Refills: 0 | Status: COMPLETED | OUTPATIENT
Start: 2022-11-15 | End: 2022-11-16

## 2022-11-15 RX ADMIN — Medication 2 MILLIGRAM(S): at 18:24

## 2022-11-15 RX ADMIN — Medication 40 MILLIEQUIVALENT(S): at 13:42

## 2022-11-15 RX ADMIN — MORPHINE SULFATE 4 MILLIGRAM(S): 50 CAPSULE, EXTENDED RELEASE ORAL at 01:31

## 2022-11-15 RX ADMIN — Medication 2 MILLIGRAM(S): at 06:33

## 2022-11-15 RX ADMIN — Medication 40 MILLIEQUIVALENT(S): at 17:18

## 2022-11-15 RX ADMIN — DEXTROSE MONOHYDRATE, SODIUM CHLORIDE, AND POTASSIUM CHLORIDE 75 MILLILITER(S): 50; .745; 4.5 INJECTION, SOLUTION INTRAVENOUS at 15:24

## 2022-11-15 RX ADMIN — ENOXAPARIN SODIUM 40 MILLIGRAM(S): 100 INJECTION SUBCUTANEOUS at 21:09

## 2022-11-15 RX ADMIN — Medication 40 MILLIEQUIVALENT(S): at 10:11

## 2022-11-15 RX ADMIN — PANTOPRAZOLE SODIUM 40 MILLIGRAM(S): 20 TABLET, DELAYED RELEASE ORAL at 06:19

## 2022-11-15 RX ADMIN — SODIUM CHLORIDE 100 MILLILITER(S): 9 INJECTION INTRAMUSCULAR; INTRAVENOUS; SUBCUTANEOUS at 04:05

## 2022-11-15 RX ADMIN — Medication 2 PACKET(S): at 23:31

## 2022-11-15 RX ADMIN — OXYCODONE HYDROCHLORIDE 5 MILLIGRAM(S): 5 TABLET ORAL at 21:04

## 2022-11-15 RX ADMIN — OXYCODONE HYDROCHLORIDE 5 MILLIGRAM(S): 5 TABLET ORAL at 22:04

## 2022-11-15 RX ADMIN — Medication 2 PACKET(S): at 17:19

## 2022-11-15 RX ADMIN — SODIUM CHLORIDE 2000 MILLILITER(S): 9 INJECTION, SOLUTION INTRAVENOUS at 01:31

## 2022-11-15 NOTE — PATIENT PROFILE ADULT - FUNCTIONAL ASSESSMENT - BASIC MOBILITY 6.
4-calculated by average/Not able to assess (calculate score using [facility] averaging method)

## 2022-11-15 NOTE — PROVIDER CONTACT NOTE (CRITICAL VALUE NOTIFICATION) - SITUATION
face to face
phone
pt admitted for etoh withdrawal and abd pain, on iv fluids, lactate elevated but trending down

## 2022-11-15 NOTE — H&P ADULT - ASSESSMENT
#Intractable vomiting    #Alcohol Withdrawal    #ROSA    #GERD  - Continue home sucralfate and pantoprazole    Code Full  Diet: Regular #Intractable vomiting  - Zofran PRN  - May be 2/2 to alcohol abuse    #Alcohol Withdrawal  - Pt has hx of alcohol abuse and withdrawal  - Librium w/ breakthrough lorazepam ordered  - Monitor    #ROSA  - NS@100  - Avoid nephrotoxic meds  - Likely 2/2 to fluid losses from vomiting    #GERD  - Continue home sucralfate and pantoprazole    Code Full  Diet: Regular

## 2022-11-15 NOTE — H&P ADULT - NSHPLABSRESULTS_GEN_ALL_CORE
12.9   2.85  )-----------( 227      ( 14 Nov 2022 17:00 )             40.9       11-15    141  |  105  |  11  ----------------------------<  87  3.2<L>   |  30  |  0.94    Ca    8.1<L>      15 Nov 2022 08:25    TPro  8.4<H>  /  Alb  3.4  /  TBili  0.4  /  DBili  x   /  AST  87<H>  /  ALT  63  /  AlkPhos  79  11-14

## 2022-11-15 NOTE — H&P ADULT - HISTORY OF PRESENT ILLNESS
58yo male with pmhx etoh abuse, gastritis, presenting with diffuse abdominal pain, vomiting. Has had this before. Reports band-like pain across mid-abdomen. Drank this morning but has been vomiting since 2/2 pain. Has had bouts of alcohol withdrawals in the past. Reports sweating, anxiety, tachycardia and tremors at this time. Has not taken anything for pain but unable to keep down solid foods.

## 2022-11-15 NOTE — H&P ADULT - NSHPPHYSICALEXAM_GEN_ALL_CORE
T(C): 36.7 (11-15-22 @ 04:18), Max: 37.2 (11-14-22 @ 19:10)  HR: 68 (11-15-22 @ 04:18) (68 - 130)  BP: 155/86 (11-15-22 @ 04:18) (122/71 - 155/86)  RR: 18 (11-15-22 @ 04:18) (18 - 23)  SpO2: 97% (11-15-22 @ 04:18) (97% - 100%)    CONSTITUTIONAL: Well groomed, no apparent distress  EYES: PERRLA and symmetric, EOMI  ENMT: Oral mucosa with moist membranes.  RESP: No respiratory distress, no use of accessory muscles; CTA b/l, no WRR  CV: RRR, +S1S2, no MRG; no JVD; no peripheral edema  GI: Soft, ND, BS+ no asterixis. Generalized tenderness.  LYMPH: No cervical LAD or tenderness; no axillary LAD or tenderness; no inguinal LAD or tenderness  MSK: Normal ROM without pain, no spinal tenderness, normal muscle strength/tone  SKIN: No rashes or ulcers noted; no subcutaneous nodules or induration palpable  NEURO: CN II-XII intact;  sensation intact in upper and lower extremities b/l to light touch. Tremors b/l.  PSYCH: Appropriate insight/judgment; A+O x 3, mood and affect appropriate, recent/remote memory intact

## 2022-11-15 NOTE — PROGRESS NOTE ADULT - ASSESSMENT
58yo male with history of ETOH abuse w/ history of DTs, gastritis/ PUD & HLD presented with diffuse abdominal pain and vomiting after recently drinking. Pt was also found to have ROSA and lactic acidosis.    Intractable vomiting  - resolved  - Zofran PRN  - likely due to alcohol abuse exacerbating his gastritis   - c/w home sucralfate and pantoprazole    Alcohol use  - no signs of withdrawal at this time, but will watch   - will give PRN Librium if needed  - start folate, thiamin and MVN  - pt's leukopenia, thrombocytopenia and steatosis on CT are all likely due to ETOH use - counseled on ETOH abstinence     Hypokalemia/ Hypophosphatemia  - replace w/ KCl and phos    lactic acidosis  - likley due to ETOH use  - improving    ROSA  - resolved  - likely prerenal from fluid losses from vomiting    GERD  - c/w home sucralfate and pantoprazole    Prophylaxis:  DVT: Protonix  GI: Lovenox   56yo male with history of ETOH abuse w/ history of DTs, gastritis/ PUD & HLD presented with diffuse abdominal pain and vomiting after recently drinking. Pt was also found to have ROSA and lactic acidosis.    Intractable vomiting  - resolved  - Zofran PRN  - likely due to alcohol abuse exacerbating his gastritis   - c/w home sucralfate and pantoprazole    Alcohol use  - no signs of withdrawal at this time, but will watch   - will give PRN Librium if needed  - start folate, thiamin and MVN  - pt's leukopenia, thrombocytopenia and steatosis on CT are all likely due to ETOH use - counseled on ETOH abstinence     Hypokalemia/ Hypophosphatemia  - replace w/ KCl and phos    lactic acidosis  - likley due to ETOH use  - improving  - given history of vomiting will also get CXR to rule out aspiration PNA    ROSA  - resolved  - likely prerenal from fluid losses from vomiting    GERD  - c/w home sucralfate and pantoprazole    Prophylaxis:  DVT: Protonix  GI: Lovenox

## 2022-11-15 NOTE — PATIENT PROFILE ADULT - FALL HARM RISK - HARM RISK INTERVENTIONS
Assistance with ambulation/Assistance OOB with selected safe patient handling equipment/Communicate Risk of Fall with Harm to all staff/Monitor for mental status changes/Monitor gait and stability/Reinforce activity limits and safety measures with patient and family/Sit up slowly, dangle for a short time, stand at bedside before walking/Tailored Fall Risk Interventions/Toileting schedule using arm’s reach rule for commode and bathroom/Use of alarms - bed, chair and/or voice tab/Visual Cue: Yellow wristband and red socks/Bed in lowest position, wheels locked, appropriate side rails in place/Call bell, personal items and telephone in reach/Instruct patient to call for assistance before getting out of bed or chair/Non-slip footwear when patient is out of bed/[place] to call system/Physically safe environment - no spills, clutter or unnecessary equipment/Purposeful Proactive Rounding/Room/bathroom lighting operational, light cord in reach

## 2022-11-15 NOTE — PROGRESS NOTE ADULT - SUBJECTIVE AND OBJECTIVE BOX
56yo male with history of ETOH abuse w/ history of DTs, gastritis/ PUD & HLD presented with diffuse abdominal pain and vomiting after recently drinking. Pt was also found to have ROSA and lactic acidosis. He is lying in bed in NAD.     MEDICATIONS  (STANDING):  pantoprazole    Tablet 40 milliGRAM(s) Oral before breakfast  potassium phosphate / sodium phosphate Powder (PHOS-NaK) 2 Packet(s) Oral four times a day  sodium chloride 0.9% with potassium chloride 20 mEq/L 1000 milliLiter(s) (75 mL/Hr) IV Continuous <Continuous>    MEDICATIONS  (PRN):  chlordiazePOXIDE 50 milliGRAM(s) Oral every 1 hour PRN CIWA-Ar score 8 or greater  LORazepam   Injectable 2 milliGRAM(s) IV Push every 2 hours PRN CIWA-Ar score increase by 2 points and a total score of 7 or less  melatonin 3 milliGRAM(s) Oral at bedtime PRN Insomnia  ondansetron Injectable 4 milliGRAM(s) IV Push every 8 hours PRN Nausea and/or Vomiting  sucralfate 1 Gram(s) Oral four times a day PRN Reflux      Allergies    No Known Allergies    Intolerances        Vital Signs Last 24 Hrs  T(C): 36.3 (15 Nov 2022 16:43), Max: 36.8 (15 Nov 2022 11:28)  T(F): 97.3 (15 Nov 2022 16:43), Max: 98.2 (15 Nov 2022 11:28)  HR: 79 (15 Nov 2022 16:43) (68 - 95)  BP: 137/84 (15 Nov 2022 16:43) (122/71 - 155/86)   RR: 18 (15 Nov 2022 16:43) (18 - 20)  SpO2: 99% (15 Nov 2022 16:43) (97% - 99%)    Parameters below as of 15 Nov 2022 04:18  Patient On (Oxygen Delivery Method): room air        PHYSICAL EXAM:  GENERAL: NAD, well-groomed, well-developed  HEAD:  Atraumatic, Normocephalic  EYES: EOMI, PERRLA   NECK: Supple  NERVOUS SYSTEM:  Alert, no tremors   CHEST/LUNG: Clear to auscultation bilaterally; No rales, rhonchi, wheezing, or rubs  HEART: Regular rate and rhythm; No murmurs, rubs, or gallops  ABDOMEN: Soft, Nontender, Nondistended; Bowel sounds present  EXTREMITIES: No clubbing, cyanosis, or edema       LABS:                        10.1   2.50  )-----------( 146      ( 15 Nov 2022 08:25 )             33.3     11-15    141  |  105  |  11  ----------------------------<  87  3.2<L>   |  30  |  0.94    Ca    8.1<L>      15 Nov 2022 08:25  Phos  1.9     11-15  Mg     1.8     11-15    TPro  6.4  /  Alb  2.9<L>  /  TBili  0.7  /  DBili  0.3  /  AST  54<H>  /  ALT  46  /  AlkPhos  61  11-15     RADIOLOGY & ADDITIONAL TESTS:    11-15-22 @ 07:01  -  11-15-22 @ 19:10  --------------------------------------------------------  IN:    Oral Fluid: 150 mL  Total IN: 150 mL    OUT:    Voided (mL): 500 mL  Total OUT: 500 mL    Total NET: -350 mL

## 2022-11-16 ENCOUNTER — TRANSCRIPTION ENCOUNTER (OUTPATIENT)
Age: 57
End: 2022-11-16

## 2022-11-16 VITALS
TEMPERATURE: 97 F | RESPIRATION RATE: 17 BRPM | OXYGEN SATURATION: 99 % | DIASTOLIC BLOOD PRESSURE: 88 MMHG | SYSTOLIC BLOOD PRESSURE: 136 MMHG | HEART RATE: 94 BPM

## 2022-11-16 LAB
A1C WITH ESTIMATED AVERAGE GLUCOSE RESULT: 5.1 % — SIGNIFICANT CHANGE UP (ref 4–5.6)
ALBUMIN SERPL ELPH-MCNC: 2.7 G/DL — LOW (ref 3.3–5)
ALP SERPL-CCNC: 59 U/L — SIGNIFICANT CHANGE UP (ref 40–120)
ALT FLD-CCNC: 39 U/L — SIGNIFICANT CHANGE UP (ref 12–78)
ANION GAP SERPL CALC-SCNC: 11 MMOL/L — SIGNIFICANT CHANGE UP (ref 5–17)
ANISOCYTOSIS BLD QL: SLIGHT — SIGNIFICANT CHANGE UP
AST SERPL-CCNC: 45 U/L — HIGH (ref 15–37)
BASOPHILS # BLD AUTO: 0.04 K/UL — SIGNIFICANT CHANGE UP (ref 0–0.2)
BASOPHILS NFR BLD AUTO: 2 % — SIGNIFICANT CHANGE UP (ref 0–2)
BILIRUB SERPL-MCNC: 0.8 MG/DL — SIGNIFICANT CHANGE UP (ref 0.2–1.2)
BUN SERPL-MCNC: 4 MG/DL — LOW (ref 7–23)
CALCIUM SERPL-MCNC: 8.1 MG/DL — LOW (ref 8.5–10.1)
CHLORIDE SERPL-SCNC: 106 MMOL/L — SIGNIFICANT CHANGE UP (ref 96–108)
CHOLEST SERPL-MCNC: 148 MG/DL — SIGNIFICANT CHANGE UP
CO2 SERPL-SCNC: 23 MMOL/L — SIGNIFICANT CHANGE UP (ref 22–31)
CREAT SERPL-MCNC: 0.79 MG/DL — SIGNIFICANT CHANGE UP (ref 0.5–1.3)
EGFR: 105 ML/MIN/1.73M2 — SIGNIFICANT CHANGE UP
ELLIPTOCYTES BLD QL SMEAR: SLIGHT — SIGNIFICANT CHANGE UP
EOSINOPHIL # BLD AUTO: 0.22 K/UL — SIGNIFICANT CHANGE UP (ref 0–0.5)
EOSINOPHIL NFR BLD AUTO: 10 % — HIGH (ref 0–6)
ESTIMATED AVERAGE GLUCOSE: 100 MG/DL — SIGNIFICANT CHANGE UP (ref 68–114)
FERRITIN SERPL-MCNC: 39 NG/ML — SIGNIFICANT CHANGE UP (ref 30–400)
FOLATE SERPL-MCNC: >20 NG/ML — SIGNIFICANT CHANGE UP
GIANT PLATELETS BLD QL SMEAR: PRESENT — SIGNIFICANT CHANGE UP
GLUCOSE SERPL-MCNC: 77 MG/DL — SIGNIFICANT CHANGE UP (ref 70–99)
HCT VFR BLD CALC: 33.9 % — LOW (ref 39–50)
HDLC SERPL-MCNC: 86 MG/DL — SIGNIFICANT CHANGE UP
HGB BLD-MCNC: 10.3 G/DL — LOW (ref 13–17)
IRON SATN MFR SERPL: 240 UG/DL — HIGH (ref 45–165)
IRON SATN MFR SERPL: 77 % — HIGH (ref 16–55)
LACTATE SERPL-SCNC: 0.8 MMOL/L — SIGNIFICANT CHANGE UP (ref 0.7–2)
LG PLATELETS BLD QL AUTO: SLIGHT — SIGNIFICANT CHANGE UP
LIPID PNL WITH DIRECT LDL SERPL: 46 MG/DL — SIGNIFICANT CHANGE UP
LYMPHOCYTES # BLD AUTO: 1.14 K/UL — SIGNIFICANT CHANGE UP (ref 1–3.3)
LYMPHOCYTES # BLD AUTO: 52 % — HIGH (ref 13–44)
MAGNESIUM SERPL-MCNC: 1.6 MG/DL — SIGNIFICANT CHANGE UP (ref 1.6–2.6)
MANUAL SMEAR VERIFICATION: SIGNIFICANT CHANGE UP
MCHC RBC-ENTMCNC: 23.2 PG — LOW (ref 27–34)
MCHC RBC-ENTMCNC: 30.4 G/DL — LOW (ref 32–36)
MCV RBC AUTO: 76.4 FL — LOW (ref 80–100)
MICROCYTES BLD QL: SIGNIFICANT CHANGE UP
MONOCYTES # BLD AUTO: 0.18 K/UL — SIGNIFICANT CHANGE UP (ref 0–0.9)
MONOCYTES NFR BLD AUTO: 8 % — SIGNIFICANT CHANGE UP (ref 2–14)
NEUTROPHILS # BLD AUTO: 0.61 K/UL — LOW (ref 1.8–7.4)
NEUTROPHILS NFR BLD AUTO: 28 % — LOW (ref 43–77)
NON HDL CHOLESTEROL: 61 MG/DL — SIGNIFICANT CHANGE UP
NRBC # BLD: 0 /100 — SIGNIFICANT CHANGE UP (ref 0–0)
NRBC # BLD: SIGNIFICANT CHANGE UP /100 WBCS (ref 0–0)
PHOSPHATE SERPL-MCNC: 2.6 MG/DL — SIGNIFICANT CHANGE UP (ref 2.5–4.5)
PLAT MORPH BLD: ABNORMAL
PLATELET # BLD AUTO: 143 K/UL — LOW (ref 150–400)
POLYCHROMASIA BLD QL SMEAR: SLIGHT — SIGNIFICANT CHANGE UP
POTASSIUM SERPL-MCNC: 3.3 MMOL/L — LOW (ref 3.5–5.3)
POTASSIUM SERPL-SCNC: 3.3 MMOL/L — LOW (ref 3.5–5.3)
PROT SERPL-MCNC: 6.4 GM/DL — SIGNIFICANT CHANGE UP (ref 6–8.3)
RBC # BLD: 4.44 M/UL — SIGNIFICANT CHANGE UP (ref 4.2–5.8)
RBC # BLD: 4.44 M/UL — SIGNIFICANT CHANGE UP (ref 4.2–5.8)
RBC # FLD: 20.5 % — HIGH (ref 10.3–14.5)
RBC BLD AUTO: ABNORMAL
RETICS #: 89.7 K/UL — SIGNIFICANT CHANGE UP (ref 25–125)
RETICS/RBC NFR: 2 % — SIGNIFICANT CHANGE UP (ref 0.5–2.5)
SODIUM SERPL-SCNC: 140 MMOL/L — SIGNIFICANT CHANGE UP (ref 135–145)
TIBC SERPL-MCNC: 310 UG/DL — SIGNIFICANT CHANGE UP (ref 220–430)
TRIGL SERPL-MCNC: 75 MG/DL — SIGNIFICANT CHANGE UP
UIBC SERPL-MCNC: 70 UG/DL — LOW (ref 110–370)
VIT B12 SERPL-MCNC: 691 PG/ML — SIGNIFICANT CHANGE UP (ref 232–1245)
WBC # BLD: 2.19 K/UL — LOW (ref 3.8–10.5)
WBC # FLD AUTO: 2.19 K/UL — LOW (ref 3.8–10.5)

## 2022-11-16 PROCEDURE — 99239 HOSP IP/OBS DSCHRG MGMT >30: CPT

## 2022-11-16 PROCEDURE — 71046 X-RAY EXAM CHEST 2 VIEWS: CPT | Mod: 26

## 2022-11-16 RX ADMIN — OXYCODONE HYDROCHLORIDE 5 MILLIGRAM(S): 5 TABLET ORAL at 05:51

## 2022-11-16 RX ADMIN — DEXTROSE MONOHYDRATE, SODIUM CHLORIDE, AND POTASSIUM CHLORIDE 75 MILLILITER(S): 50; .745; 4.5 INJECTION, SOLUTION INTRAVENOUS at 07:11

## 2022-11-16 RX ADMIN — PANTOPRAZOLE SODIUM 40 MILLIGRAM(S): 20 TABLET, DELAYED RELEASE ORAL at 08:30

## 2022-11-16 RX ADMIN — Medication 1 TABLET(S): at 11:41

## 2022-11-16 RX ADMIN — Medication 100 MILLIGRAM(S): at 11:42

## 2022-11-16 RX ADMIN — OXYCODONE HYDROCHLORIDE 5 MILLIGRAM(S): 5 TABLET ORAL at 06:21

## 2022-11-16 RX ADMIN — Medication 2 PACKET(S): at 05:51

## 2022-11-16 RX ADMIN — Medication 1 MILLIGRAM(S): at 11:41

## 2022-11-16 RX ADMIN — Medication 2 PACKET(S): at 11:41

## 2022-11-16 NOTE — DISCHARGE NOTE PROVIDER - NSDCCPCAREPLAN_GEN_ALL_CORE_FT
PRINCIPAL DISCHARGE DIAGNOSIS  Diagnosis: Generalized abdominal pain  Assessment and Plan of Treatment: stop drinking   follow up with pcp      SECONDARY DISCHARGE DIAGNOSES  Diagnosis: Alcohol dependence with withdrawal  Assessment and Plan of Treatment:

## 2022-11-16 NOTE — DISCHARGE NOTE NURSING/CASE MANAGEMENT/SOCIAL WORK - PATIENT PORTAL LINK FT
You can access the FollowMyHealth Patient Portal offered by [facility] by registering at the following website: [email or web address]. By joining [facility]’s FollowMyHealth portal, you will also be able to view your health information using other applications (apps) compatible with our system.

## 2022-11-16 NOTE — DISCHARGE NOTE NURSING/CASE MANAGEMENT/SOCIAL WORK - NSDCPEFALRISK_GEN_ALL_CORE
For information on Fall & Injury Prevention, visit: [email or web address] OR  [email or web address] OR  https://www.cdc.gov/steadi/patient.html

## 2022-11-16 NOTE — DISCHARGE NOTE PROVIDER - HOSPITAL COURSE
56yo male with history of ETOH abuse w/ history of DTs, gastritis/ PUD & HLD presented with diffuse abdominal pain and vomiting after recently drinking. Pt was also found to have ROSA and lactic acidosis.    Intractable vomiting  - resolved  - likely due to alcohol abuse exacerbating his gastritis   - c/w home sucralfate and pantoprazole    Alcohol use  - no signs of withdrawal at this time

## 2022-11-16 NOTE — DISCHARGE NOTE NURSING/CASE MANAGEMENT/SOCIAL WORK - NSDCVIVACCINE_GEN_ALL_CORE_FT
COVID-19, mRNA, LNP-S, PF, 100 mcg/ 0.5 mL dose (Moderna); 10-Jovan-2021 15:38; Reji Hernandez (RN); [employer] [id number] (Exp. Date: 13-Jul-2021); IntraMuscular; Deltoid Right.; 0.5 milliLiter(s);   influenza, injectable, quadrivalent, preservative free; 31-Jan-2019 15:53; Ayaka Bynum (RN); [employer]; [id number] (Exp. Date: 30-Jun-2019); IntraMuscular; Deltoid Right.; 0.5 milliLiter(s); VIS (VIS Published: 07-Aug-2015, VIS Presented: 31-Jan-2019);   influenza, injectable, quadrivalent, preservative free; 10-Nov-2019 14:13; Laverne Lane (RN); [employer]; [id number] (Exp. Date: 30-Jun-2020); IntraMuscular; Deltoid Left.; 0.5 milliLiter(s); VIS (VIS Published: 15-Aug-2019, VIS Presented: 10-Nov-2019);   influenza, injectable, quadrivalent, preservative free; 13-Oct-2020 11:56; Kimberli Cronin (RN); [employer]; [id number] (Exp. Date: 30-Jun-2021); IntraMuscular; Deltoid Right.; 0.5 milliLiter(s); VIS (VIS Published: 15-Aug-2019, VIS Presented: 13-Oct-2020);   Td (adult) preservative free; 18-Jan-2020 20:04; Yulia Vance (RN); [employer]; [id number] (Exp. Date: 19-Jan-2021); IntraMuscular; Deltoid Right.; 0.5 milliLiter(s); VIS (VIS Published: 18-Jan-2020, VIS Presented: 18-Jan-2020);

## 2022-11-19 NOTE — ED ADULT NURSE NOTE - COVID-19 RESULT
FAMILY HISTORY:  Mother  Still living? Unknown  Family history of diabetes mellitus (DM), Age at diagnosis: Age Unknown  FH: HTN (hypertension), Age at diagnosis: Age Unknown    
NEGATIVE

## 2022-11-21 DIAGNOSIS — E83.39 OTHER DISORDERS OF PHOSPHORUS METABOLISM: ICD-10-CM

## 2022-11-21 DIAGNOSIS — K27.9 PEPTIC ULCER, SITE UNSPECIFIED, UNSPECIFIED AS ACUTE OR CHRONIC, WITHOUT HEMORRHAGE OR PERFORATION: ICD-10-CM

## 2022-11-21 DIAGNOSIS — R11.2 NAUSEA WITH VOMITING, UNSPECIFIED: ICD-10-CM

## 2022-11-21 DIAGNOSIS — E78.5 HYPERLIPIDEMIA, UNSPECIFIED: ICD-10-CM

## 2022-11-21 DIAGNOSIS — D69.6 THROMBOCYTOPENIA, UNSPECIFIED: ICD-10-CM

## 2022-11-21 DIAGNOSIS — N17.9 ACUTE KIDNEY FAILURE, UNSPECIFIED: ICD-10-CM

## 2022-11-21 DIAGNOSIS — K29.20 ALCOHOLIC GASTRITIS WITHOUT BLEEDING: ICD-10-CM

## 2022-11-21 DIAGNOSIS — K21.9 GASTRO-ESOPHAGEAL REFLUX DISEASE WITHOUT ESOPHAGITIS: ICD-10-CM

## 2022-11-21 DIAGNOSIS — E87.6 HYPOKALEMIA: ICD-10-CM

## 2022-11-21 DIAGNOSIS — E87.20 ACIDOSIS, UNSPECIFIED: ICD-10-CM

## 2022-11-21 DIAGNOSIS — F10.288 ALCOHOL DEPENDENCE WITH OTHER ALCOHOL-INDUCED DISORDER: ICD-10-CM

## 2022-11-21 DIAGNOSIS — D72.819 DECREASED WHITE BLOOD CELL COUNT, UNSPECIFIED: ICD-10-CM

## 2022-11-21 DIAGNOSIS — K76.0 FATTY (CHANGE OF) LIVER, NOT ELSEWHERE CLASSIFIED: ICD-10-CM

## 2022-11-21 DIAGNOSIS — Z20.822 CONTACT WITH AND (SUSPECTED) EXPOSURE TO COVID-19: ICD-10-CM

## 2022-11-24 NOTE — ED ADULT NURSE NOTE - NSFALLRSKOUTCOME_ED_ALL_ED
You can access the FollowMyHealth Patient Portal offered by [facility] by registering at the following website: [email or web address] By joining [facility]’s FollowMyHealth portal, you will also be able to view your health information using other applications (apps) compatible with our system. Fall with Harm Risk

## 2022-11-28 NOTE — PROGRESS NOTE BEHAVIORAL HEALTH - NSBHATTESTSEENBY_PSY_A_CORE
NEUROSURGERY INPATIENT CONSULT NOTE    Chief Complaint: meningioma     HPI:   Essie Schaumann is a [de-identified] y.o.  female who is well known to our services. She previously underwent left craniotomy and resection of meningioma by Dr. Maya Hayes in 2018. Patient was also seen in the hospital 9/6/22 for recurrent meningioma. It was recommended she continue rehab and follow up in the office to discuss repeat resection. She presents to the hospital c/o right sided weakness and expressive aphasia. She was seen by Neurology and was placed on decadron and Keppra. MRI brain demonstrates increased vasogenic edema within the left frontoparietal lobe compared to previous MRI in September for which Neurosurgery was consulted.      Past Medical History:   Diagnosis Date    Anxiety     Hyperlipidemia     Hypertension      Past Surgical History:   Procedure Laterality Date    CRANIOTOMY N/A 12/13/2018    LEFT FRONTAL CRANIOTOMY AND RESECTION OF MENINGIOMA  STEREOTATIC IMAGE GUIDED SURGERY (STEALTH) -- NEEDS PORTER HEAD WARNER, STEALTH STATION, IMAGNETIC BIPOLAR, CUSA, ULTRASONIC ASPIRATOR, AQUA MANTYS [facility]    OTHER SURGICAL HISTORY      POSSIBLE PERMANENT DENTURE- PATIENT HAS HARD TIME [address] [place]      Family History   Problem Relation Age of Onset    Cancer Maternal Aunt         nephew    Breast Cancer Maternal Aunt         Medications:   Current Facility-Administered Medications   Medication Dose Route Frequency Provider Last Rate Last Admin    dexamethasone (DECADRON) injection 4 mg  4 mg IntraVENous Q6H Herman Deluna APRN - NP   4 mg at 11/28/22 0829    [START ON 12/1/2022] dexamethasone (DECADRON) tablet 2 mg  2 mg Oral 4 times per day Herman Deluna APRN - NP        hydrALAZINE (APRESOLINE) injection 20 mg  20 mg IntraVENous Q4H PRN Katie Osullivan MD   20 mg at 11/28/22 0829    enalapril (VASOTEC) tablet 20 mg  20 mg Oral
Daily Samer Lorna Montejo MD   20 mg at 11/28/22 0827    metoprolol tartrate (LOPRESSOR) tablet 25 mg  25 mg Oral BID Priti Go MD   25 mg at 11/28/22 5113    rosuvastatin (CRESTOR) tablet 20 mg  20 mg Oral QPM Samer Lorna Montejo MD   20 mg at 11/27/22 2118    topiramate (TOPAMAX) tablet 25 mg  25 mg Oral Nightly Samer Lorna Montejo MD   25 mg at 11/27/22 2118    verapamil (CALAN SR) extended release tablet 240 mg  240 mg Oral Daily Samer Lorna Montejo MD   240 mg at 11/28/22 0827    aspirin chewable tablet 81 mg  81 mg Oral Daily Samer Lorna Montejo MD   81 mg at 11/28/22 0827    sodium chloride flush 0.9 % injection 10 mL  10 mL IntraVENous 2 times per day Priti Go MD   10 mL at 11/28/22 0829    sodium chloride flush 0.9 % injection 10 mL  10 mL IntraVENous PRN Samer Lorna Montejo MD   10 mL at 11/28/22 0258    0.9 % sodium chloride infusion   IntraVENous PRN Rileyr Lorna Montejo MD        enoxaparin (LOVENOX) injection 40 mg  40 mg SubCUTAneous Daily Samer Lorna Montejo MD   40 mg at 11/28/22 0827    ondansetron (ZOFRAN-ODT) disintegrating tablet 4 mg  4 mg Oral Q8H PRN Rileyr Lorna Montejo MD        Or    ondansetron [facility]) injection 4 mg  4 mg IntraVENous Q6H PRN Samer Lorna Montejo MD        magnesium hydroxide (MILK OF MAGNESIA) 400 MG/5ML suspension 30 mL  30 mL Oral Daily PRN Rileyr Lorna Montejo MD        acetaminophen (TYLENOL) tablet 650 mg  650 mg Oral Q6H PRN Rileyr Lorna Montejo MD   650 mg at 11/27/22 1004    Or    acetaminophen (TYLENOL) suppository 650 mg  650 mg Rectal Q6H PRN Samer Lorna Montejo MD        levETIRAcetam (KEPPRA) 500 mg/100 mL IVPB  500 mg IntraVENous Q12H Samer Lorna Montejo MD   Stopped at 11/28/22 0320    clopidogrel (PLAVIX) tablet 75 mg  75 mg Oral Daily Bridgette Penaloza MD   75 mg at 11/27/22 0944        Allergies:    Hydrochlorothiazide, Pcn [penicillins], and Sulfa antibiotics     BP (!) 175/76   Pulse 79   Temp 97 °F (36.1 °C) (Temporal)   Resp 18   Ht 5' (1.524 m)   Wt 184 lb (83.5 kg)   SpO2
97%   BMI 35.94 kg/m²     Review of Systems   Unable to perform ROS: Acuity of condition      Physical Exam  Constitutional:       Appearance: She is well-developed. HENT:      Head: Normocephalic and atraumatic. Eyes:      Extraocular Movements: Extraocular movements intact. Conjunctiva/sclera: Conjunctivae normal.      Pupils: Pupils are equal, round, and reactive to light. Cardiovascular:      Rate and Rhythm: Normal rate. Pulmonary:      Effort: Pulmonary effort is normal.   Abdominal:      General: There is no distension. Musculoskeletal:      Cervical back: Normal range of motion and neck supple. Skin:     General: Skin is warm and dry. Neurological:      Mental Status: She is alert. Comments: Alert and oriented to person and place, but not to year or month  Mild expressive aphasia   CN3-12 intact  BUE at least 4/5 strength  Able to lift legs of bed, but not against resistance   Sensation intact to light touch     Psychiatric:      Comments: Mild confusion           Assessment:   Large interhemispheric meningioma  Hx craniotomy and meningioma resection in 2018 by Dr. [place]:  -MRI discussed with patient. Dr. Neda Parra to review and discuss possible repeat resection with patient  -Decadron and Keppra  -PT/OT      Electronically signed by Genie Drake PA-C on 11/28/2022 at 8:34 AM     I have interviewed and examined the patient and agree with above. Aphasia and hemiparesis improved and is at baseline. She has recurrent meningioma with edema. Lesion is close to motor strip. Will be challenging to resect. Will recommend outpatient follow up to discuss possible resection. Continue AED. Await EEG.   I spent over 30 mins on medical decision making and in discussing her condition with her    Shruthi Curry MD
attending Psychiatrist without NP/Trainee

## 2022-11-30 NOTE — PATIENT PROFILE ADULT - HISTORY OF COVID-19 VACCINATION
Vaccine status unknown Olumiant Counseling: I discussed with the patient the risks of Olumiant therapy including but not limited to upper respiratory tract infections, shingles, cold sores, and nausea. Live vaccines should be avoided.  This medication has been linked to serious infections; higher rate of mortality; malignancy and lymphoproliferative disorders; major adverse cardiovascular events; thrombosis; gastrointestinal perforations; neutropenia; lymphopenia; anemia; liver enzyme elevations; and lipid elevations.

## 2022-12-05 NOTE — H&P ADULT - NSHPPOADEEPVENOUSTHROMB_GEN_A_CORE
PRINCIPAL DISCHARGE DIAGNOSIS  Diagnosis: Peripheral vascular disease  Assessment and Plan of Treatment: WOUND CARE: Staples will be removed at follow up office visit.  Cover incision with dry gauze and paper tape.   BATHING: Please do not submerge wound underwater. You may shower and/or sponge bathe.  ACTIVITY: No heavy lifting anything more than 10-15lbs or straining. Otherwise, you may return to your usual level of physical activity. If you are taking narcotic pain medication (such as Percocet), do NOT drive a car, operate machinery or make important decisions.  NOTIFY YOUR SURGEON IF: Notify your surgeon and/or return to ER for temperatures greater than 101, chills sweats, pain not controlled with pain medications, significant swelling and/or skin changes in tone and/or color, significant numbness and/or tingling, significant weakness of extremity, persistent nausea and vomiting, or acutely concerning matters to you, that may require urgent medical attention.   FOLLOW-UP:  1. Please call to make a follow-up appointment within one week of discharge with Dr. Lucas.  2. Please follow up with your primary care physician in one week regarding your hospitalization.       no

## 2023-01-02 RX ADMIN — MORPHINE SULFATE 2 MILLIGRAM(S): 50 CAPSULE, EXTENDED RELEASE ORAL at 22:06

## 2023-01-02 NOTE — DISCHARGE NOTE PROVIDER - HOSPITAL COURSE
56M PMH chronic ETOH abuse/dependence with hx of alcohol withdrawal s/p frequent hospital admissions, GERD, HLD, alcoholic gastritis/esophagitis, PUD, hx of hypoxic respiratory failure requiring intubation due to aspiration PNA, staph PNA presents with c/o diffuse abdominal pain, chest pain, weakness, N/V, and coffee ground emesis (scant/dark brown). Last drink was vodka morning of coming into hospital. Alcohol level 263 in ED, CIWA > 15 with tremors. Pt admitted to the ICU for ETOH withdrawal with consistently high CIWA score's despite tx with Valium 10 mg IV x2.  Elevated lactate. Abdominal pain and chest pain likely related to underlying known alcoholic gastritis and esophagitis. Pt treated with protonix and carafate. Placed on precedex drip and standing phenobarbital. Weaned off phenobarbital. Electrolytes supplemented. No further emesis or nausea. H/H stable. Type B lactic acidosis due to underlying alcohol abuse. Improved and resolved with IVF. PT's CIWA improved. Alert, awake, oriented. Pt wanted to leave AMA and signed papers and had son come pick him up. [place] Counseling:  I discussed with the patient the risks of imiquimod including but not limited to erythema, scaling, itching, weeping, crusting, and pain.  Patient understands that the inflammatory response to imiquimod is variable from person to person and was educated regarded proper titration schedule.  If flu-like symptoms develop, patient knows to discontinue the medication and contact us.

## 2023-01-06 NOTE — PATIENT PROFILE ADULT - DISASTER - NSPRESCRALCFREQ_GEN_A_NUR
Increased stressors in her life recently.   Mother scheduled for cardiac surgery.  Reporting approx 7 hours of possible panic attack including chest tightness and bilateral hand tingling. States that anxiety has been gradually increased over the past several weeks.   4 or more times a week

## 2023-01-09 NOTE — DISCHARGE NOTE PROVIDER - NSDCCPCAREPLAN_GEN_ALL_CORE_FT
PRINCIPAL DISCHARGE DIAGNOSIS  Diagnosis: Gastritis  Assessment and Plan of Treatment: PAST SURGICAL HISTORY:  History of appendectomy     History of cataract surgery, right     History of surgery tracheobronchoplasty 11/2017

## 2023-01-12 NOTE — ED ADULT NURSE NOTE - CARDIO ASSESSMENT
This is a notice of failure to show for a medical nutrition therapy appointment. Kathy Montalvo had an appointment with the dietitian this date, but did not show, cancel or reschedule before the appointment time. Lab Results   Component Value Date/Time    LABA1C 8.4 09/07/2022 02:28 PM        If an appointment is still desired, please contact [facility] scheduling [phone].     Thank you for the referral.    Electronically signed by Isabela Myles RD, LD on 1/12/2023 at 2:28 PM
---

## 2023-01-25 NOTE — ED ADULT NURSE NOTE - BREATHING
Detail Level: Detailed
Quality 110: Preventive Care And Screening: Influenza Immunization: Influenza Immunization not Administered for Documented Reasons.
unlabored/spontaneous

## 2023-02-01 NOTE — ED PROVIDER NOTE - COVID-19 RESULT
NEGATIVE Bilobed Flap Text: The defect edges were debeveled with a #15 scalpel blade.  Given the location of the defect and the proximity to free margins a bilobe flap was deemed most appropriate.  Using a sterile surgical marker, an appropriate bilobe flap drawn around the defect.    The area thus outlined was incised deep to adipose tissue with a #15 scalpel blade.  The skin margins were undermined to an appropriate distance in all directions utilizing iris scissors.

## 2023-02-13 NOTE — PATIENT PROFILE ADULT - NSPROMUTANXFEARADDRESSFT_GEN_A_NUR
Non[facility] Non-Imaging Procedure Order Form    Patient Name: Kasie Tolentino Date: 23   Address: [address] [place] [id number] Preferred #:    [phone]       : 1967    Allergies: ALLERGIES:  Patient has no known allergies.    Ordering Physician: Naya Mccall MD  [address] [facility] [address]  Dept: [phone]    Performing Provider: [facility]     Insurance: FSC: Payor: [facility] [employer] [facility] EMPLOYEE / Plan:  BE [facility] EMP PREF / Product Type:  EMPLOYEE-PREFERRED    Priority: routine    Other:  DME for purchase               and               Left breast prosthesis and mastectomy bras for everyday use    Diagnosis: Z90.12  Special Directions: N/A     Authorization #    Electronically Signed by: Naya Mccall MD    23 8:45 AM  Authorizing Provider NPI: [id number]    
none

## 2023-02-19 NOTE — PATIENT PROFILE ADULT - INTERNATIONAL TRAVEL
No Follow up with your primary care doctor and Gastroenterologist.   Advance activity as tolerated.  Continue all previously prescribed medications as directed unless otherwise instructed.  Follow up with your primary care physician in 48-72 hours- bring copies of your results.  Return to the ER for worsening or persistent symptoms, and/or ANY NEW OR CONCERNING SYMPTOMS. If you have issues obtaining follow up, please call: [phone] to obtain a doctor or specialist who takes your insurance in your area.  You may call [phone] to make an appointment with the internal medicine clinic.

## 2023-02-20 ENCOUNTER — INPATIENT (INPATIENT)
Facility: HOSPITAL | Age: 58
LOS: 0 days | Discharge: HOME | End: 2023-02-21
Attending: INTERNAL MEDICINE | Admitting: INTERNAL MEDICINE
Payer: MEDICAID

## 2023-02-20 VITALS
DIASTOLIC BLOOD PRESSURE: 98 MMHG | TEMPERATURE: 98 F | HEIGHT: 67 IN | RESPIRATION RATE: 18 BRPM | HEART RATE: 106 BPM | SYSTOLIC BLOOD PRESSURE: 138 MMHG | WEIGHT: 179.9 LBS | OXYGEN SATURATION: 98 %

## 2023-02-20 LAB
ALBUMIN SERPL ELPH-MCNC: 3.9 G/DL — SIGNIFICANT CHANGE UP (ref 3.3–5)
ALBUMIN SERPL ELPH-MCNC: 4.1 G/DL — SIGNIFICANT CHANGE UP (ref 3.3–5)
ALP SERPL-CCNC: 57 U/L — SIGNIFICANT CHANGE UP (ref 40–120)
ALP SERPL-CCNC: 59 U/L — SIGNIFICANT CHANGE UP (ref 40–120)
ALT FLD-CCNC: 27 U/L — SIGNIFICANT CHANGE UP (ref 12–78)
ALT FLD-CCNC: 30 U/L — SIGNIFICANT CHANGE UP (ref 12–78)
ANION GAP SERPL CALC-SCNC: 7 MMOL/L — SIGNIFICANT CHANGE UP (ref 5–17)
ANION GAP SERPL CALC-SCNC: 9 MMOL/L — SIGNIFICANT CHANGE UP (ref 5–17)
APTT BLD: 28.5 SEC — SIGNIFICANT CHANGE UP (ref 27.5–35.5)
AST SERPL-CCNC: 24 U/L — SIGNIFICANT CHANGE UP (ref 15–37)
AST SERPL-CCNC: 29 U/L — SIGNIFICANT CHANGE UP (ref 15–37)
BASOPHILS # BLD AUTO: 0.04 K/UL — SIGNIFICANT CHANGE UP (ref 0–0.2)
BASOPHILS NFR BLD AUTO: 0.7 % — SIGNIFICANT CHANGE UP (ref 0–2)
BILIRUB DIRECT SERPL-MCNC: 0.2 MG/DL — SIGNIFICANT CHANGE UP (ref 0–0.3)
BILIRUB INDIRECT FLD-MCNC: 0.7 MG/DL — SIGNIFICANT CHANGE UP (ref 0.2–1)
BILIRUB SERPL-MCNC: 0.5 MG/DL — SIGNIFICANT CHANGE UP (ref 0.2–1.2)
BILIRUB SERPL-MCNC: 0.9 MG/DL — SIGNIFICANT CHANGE UP (ref 0.2–1.2)
BUN SERPL-MCNC: 11 MG/DL — SIGNIFICANT CHANGE UP (ref 7–23)
BUN SERPL-MCNC: 12 MG/DL — SIGNIFICANT CHANGE UP (ref 7–23)
CALCIUM SERPL-MCNC: 9.1 MG/DL — SIGNIFICANT CHANGE UP (ref 8.5–10.1)
CALCIUM SERPL-MCNC: 9.5 MG/DL — SIGNIFICANT CHANGE UP (ref 8.5–10.1)
CHLORIDE SERPL-SCNC: 106 MMOL/L — SIGNIFICANT CHANGE UP (ref 96–108)
CHLORIDE SERPL-SCNC: 106 MMOL/L — SIGNIFICANT CHANGE UP (ref 96–108)
CO2 SERPL-SCNC: 24 MMOL/L — SIGNIFICANT CHANGE UP (ref 22–31)
CO2 SERPL-SCNC: 28 MMOL/L — SIGNIFICANT CHANGE UP (ref 22–31)
CREAT SERPL-MCNC: 1.07 MG/DL — SIGNIFICANT CHANGE UP (ref 0.5–1.3)
CREAT SERPL-MCNC: 1.09 MG/DL — SIGNIFICANT CHANGE UP (ref 0.5–1.3)
EGFR: 80 ML/MIN/1.73M2 — SIGNIFICANT CHANGE UP
EGFR: 82 ML/MIN/1.73M2 — SIGNIFICANT CHANGE UP
EOSINOPHIL # BLD AUTO: 0.17 K/UL — SIGNIFICANT CHANGE UP (ref 0–0.5)
EOSINOPHIL NFR BLD AUTO: 3 % — SIGNIFICANT CHANGE UP (ref 0–6)
ETHANOL SERPL-MCNC: 136 MG/DL — HIGH (ref 0–10)
FLUAV AG NPH QL: SIGNIFICANT CHANGE UP
FLUBV AG NPH QL: SIGNIFICANT CHANGE UP
GLUCOSE BLDC GLUCOMTR-MCNC: 98 MG/DL — SIGNIFICANT CHANGE UP (ref 70–99)
GLUCOSE SERPL-MCNC: 83 MG/DL — SIGNIFICANT CHANGE UP (ref 70–99)
GLUCOSE SERPL-MCNC: 96 MG/DL — SIGNIFICANT CHANGE UP (ref 70–99)
HCT VFR BLD CALC: 39.8 % — SIGNIFICANT CHANGE UP (ref 39–50)
HGB BLD-MCNC: 12.3 G/DL — LOW (ref 13–17)
IMM GRANULOCYTES NFR BLD AUTO: 0.4 % — SIGNIFICANT CHANGE UP (ref 0–0.9)
INR BLD: 1.01 RATIO — SIGNIFICANT CHANGE UP (ref 0.88–1.16)
LIDOCAIN IGE QN: 244 U/L — SIGNIFICANT CHANGE UP (ref 73–393)
LYMPHOCYTES # BLD AUTO: 1.68 K/UL — SIGNIFICANT CHANGE UP (ref 1–3.3)
LYMPHOCYTES # BLD AUTO: 29.6 % — SIGNIFICANT CHANGE UP (ref 13–44)
MAGNESIUM SERPL-MCNC: 2 MG/DL — SIGNIFICANT CHANGE UP (ref 1.6–2.6)
MAGNESIUM SERPL-MCNC: 2.2 MG/DL — SIGNIFICANT CHANGE UP (ref 1.6–2.6)
MANUAL SMEAR VERIFICATION: SIGNIFICANT CHANGE UP
MCHC RBC-ENTMCNC: 21.8 PG — LOW (ref 27–34)
MCHC RBC-ENTMCNC: 30.9 G/DL — LOW (ref 32–36)
MCV RBC AUTO: 70.6 FL — LOW (ref 80–100)
MONOCYTES # BLD AUTO: 0.37 K/UL — SIGNIFICANT CHANGE UP (ref 0–0.9)
MONOCYTES NFR BLD AUTO: 6.5 % — SIGNIFICANT CHANGE UP (ref 2–14)
NEUTROPHILS # BLD AUTO: 3.4 K/UL — SIGNIFICANT CHANGE UP (ref 1.8–7.4)
NEUTROPHILS NFR BLD AUTO: 59.8 % — SIGNIFICANT CHANGE UP (ref 43–77)
NRBC # BLD: 0 /100 WBCS — SIGNIFICANT CHANGE UP (ref 0–0)
PHOSPHATE SERPL-MCNC: 3.1 MG/DL — SIGNIFICANT CHANGE UP (ref 2.5–4.5)
PLAT MORPH BLD: NORMAL — SIGNIFICANT CHANGE UP
PLATELET # BLD AUTO: 313 K/UL — SIGNIFICANT CHANGE UP (ref 150–400)
POTASSIUM SERPL-MCNC: 3.8 MMOL/L — SIGNIFICANT CHANGE UP (ref 3.5–5.3)
POTASSIUM SERPL-MCNC: 4.1 MMOL/L — SIGNIFICANT CHANGE UP (ref 3.5–5.3)
POTASSIUM SERPL-SCNC: 3.8 MMOL/L — SIGNIFICANT CHANGE UP (ref 3.5–5.3)
POTASSIUM SERPL-SCNC: 4.1 MMOL/L — SIGNIFICANT CHANGE UP (ref 3.5–5.3)
PROT SERPL-MCNC: 7.7 GM/DL — SIGNIFICANT CHANGE UP (ref 6–8.3)
PROT SERPL-MCNC: 8.3 GM/DL — SIGNIFICANT CHANGE UP (ref 6–8.3)
PROTHROM AB SERPL-ACNC: 12 SEC — SIGNIFICANT CHANGE UP (ref 10.5–13.4)
RBC # BLD: 5.64 M/UL — SIGNIFICANT CHANGE UP (ref 4.2–5.8)
RBC # FLD: 21.2 % — HIGH (ref 10.3–14.5)
RBC BLD AUTO: NORMAL — SIGNIFICANT CHANGE UP
SARS-COV-2 RNA SPEC QL NAA+PROBE: SIGNIFICANT CHANGE UP
SODIUM SERPL-SCNC: 139 MMOL/L — SIGNIFICANT CHANGE UP (ref 135–145)
SODIUM SERPL-SCNC: 141 MMOL/L — SIGNIFICANT CHANGE UP (ref 135–145)
WBC # BLD: 5.68 K/UL — SIGNIFICANT CHANGE UP (ref 3.8–10.5)
WBC # FLD AUTO: 5.68 K/UL — SIGNIFICANT CHANGE UP (ref 3.8–10.5)

## 2023-02-20 PROCEDURE — 99285 EMERGENCY DEPT VISIT HI MDM: CPT | Mod: 25

## 2023-02-20 PROCEDURE — 36000 PLACE NEEDLE IN VEIN: CPT

## 2023-02-20 PROCEDURE — 74177 CT ABD & PELVIS W/CONTRAST: CPT | Mod: 26,MA

## 2023-02-20 PROCEDURE — 99222 1ST HOSP IP/OBS MODERATE 55: CPT

## 2023-02-20 PROCEDURE — 71045 X-RAY EXAM CHEST 1 VIEW: CPT | Mod: 26

## 2023-02-20 PROCEDURE — 93010 ELECTROCARDIOGRAM REPORT: CPT

## 2023-02-20 RX ORDER — PANTOPRAZOLE SODIUM 20 MG/1
40 TABLET, DELAYED RELEASE ORAL ONCE
Refills: 0 | Status: COMPLETED | OUTPATIENT
Start: 2023-02-20 | End: 2023-02-20

## 2023-02-20 RX ORDER — PANTOPRAZOLE SODIUM 20 MG/1
40 TABLET, DELAYED RELEASE ORAL
Refills: 0 | Status: DISCONTINUED | OUTPATIENT
Start: 2023-02-20 | End: 2023-02-21

## 2023-02-20 RX ORDER — SUCRALFATE 1 G
1 TABLET ORAL
Refills: 0 | Status: DISCONTINUED | OUTPATIENT
Start: 2023-02-20 | End: 2023-02-21

## 2023-02-20 RX ORDER — SODIUM CHLORIDE 9 MG/ML
1000 INJECTION INTRAMUSCULAR; INTRAVENOUS; SUBCUTANEOUS ONCE
Refills: 0 | Status: COMPLETED | OUTPATIENT
Start: 2023-02-20 | End: 2023-02-20

## 2023-02-20 RX ORDER — MORPHINE SULFATE 50 MG/1
4 CAPSULE, EXTENDED RELEASE ORAL ONCE
Refills: 0 | Status: DISCONTINUED | OUTPATIENT
Start: 2023-02-20 | End: 2023-02-20

## 2023-02-20 RX ORDER — THIAMINE MONONITRATE (VIT B1) 100 MG
100 TABLET ORAL ONCE
Refills: 0 | Status: COMPLETED | OUTPATIENT
Start: 2023-02-20 | End: 2023-02-20

## 2023-02-20 RX ORDER — ONDANSETRON 8 MG/1
4 TABLET, FILM COATED ORAL ONCE
Refills: 0 | Status: COMPLETED | OUTPATIENT
Start: 2023-02-20 | End: 2023-02-20

## 2023-02-20 RX ORDER — FOLIC ACID 0.8 MG
1 TABLET ORAL ONCE
Refills: 0 | Status: COMPLETED | OUTPATIENT
Start: 2023-02-20 | End: 2023-02-20

## 2023-02-20 RX ADMIN — Medication 100 MILLIGRAM(S): at 12:27

## 2023-02-20 RX ADMIN — SODIUM CHLORIDE 1000 MILLILITER(S): 9 INJECTION INTRAMUSCULAR; INTRAVENOUS; SUBCUTANEOUS at 10:37

## 2023-02-20 RX ADMIN — Medication 50 MILLIGRAM(S): at 23:44

## 2023-02-20 RX ADMIN — MORPHINE SULFATE 4 MILLIGRAM(S): 50 CAPSULE, EXTENDED RELEASE ORAL at 10:35

## 2023-02-20 RX ADMIN — Medication 1 GRAM(S): at 23:44

## 2023-02-20 RX ADMIN — PANTOPRAZOLE SODIUM 40 MILLIGRAM(S): 20 TABLET, DELAYED RELEASE ORAL at 10:40

## 2023-02-20 RX ADMIN — MORPHINE SULFATE 4 MILLIGRAM(S): 50 CAPSULE, EXTENDED RELEASE ORAL at 15:26

## 2023-02-20 RX ADMIN — Medication 2 MILLIGRAM(S): at 10:38

## 2023-02-20 RX ADMIN — Medication 1 MILLIGRAM(S): at 12:27

## 2023-02-20 RX ADMIN — ONDANSETRON 4 MILLIGRAM(S): 8 TABLET, FILM COATED ORAL at 10:35

## 2023-02-20 RX ADMIN — Medication 50 MILLIGRAM(S): at 18:47

## 2023-02-20 NOTE — ED ADULT NURSE NOTE - CHIEF COMPLAINT QUOTE
abdominal pain x 1 month, given rx meds by pmd with no relief, vomiting x 2 days. last drink yesterday. mild tremors in triage noted.

## 2023-02-20 NOTE — H&P ADULT - NSHPPHYSICALEXAM_GEN_ALL_CORE
PHYSICAL EXAMINATION:  GENERAL: NAD, Alert and Oriented x 3, restless.  HEENT:  Normocephalic and atraumatic.  Extraocular muscles are intact.  NECK: Supple.  - JVD.  CARDIOVASCULAR: RRR S1, S2.  LUNGS: CTAB, - rales, - wheezing, - rhonchi.  BACK: - CVA tenderness.  ABDOMEN: Soft, - tenderness, - distension, + BS.  EXTREMITIES: - cyanosis, - clubbing, - edema.  NEUROLOGIC: strength is symmetric, sensation intact, speech fluent.  PSYCHIATRIC: Calm.  - agitation.    SKIN: - rashes, - lesions.

## 2023-02-20 NOTE — ED PROVIDER NOTE - ENMT, MLM
Airway patent, Nasal mucosa clear. Mouth with normal mucosa. Throat has no vesicles, no oropharyngeal exudates and uvula is midline. + tongue fasciculation

## 2023-02-20 NOTE — PATIENT PROFILE ADULT - FALL HARM RISK - HARM RISK INTERVENTIONS
Assistance with ambulation/Assistance OOB with selected safe patient handling equipment/Communicate Risk of Fall with Harm to all staff/Monitor for mental status changes/Monitor gait and stability/Reinforce activity limits and safety measures with patient and family/Tailored Fall Risk Interventions/Toileting schedule using arm’s reach rule for commode and bathroom/Use of alarms - bed, chair and/or voice tab/Visual Cue: Yellow wristband and red socks/Bed in lowest position, wheels locked, appropriate side rails in place/Call bell, personal items and telephone in reach/Instruct patient to call for assistance before getting out of bed or chair/Non-slip footwear when patient is out of bed/[place] to call system/Physically safe environment - no spills, clutter or unnecessary equipment/Purposeful Proactive Rounding/Room/bathroom lighting operational, light cord in reach

## 2023-02-20 NOTE — H&P ADULT - ASSESSMENT
58yo M PMH PUD, HLD, Alcohol abuse with multiple hospitalizations, presents c/o midepigastric pain, improved after being given protonix.  Reports noncompliance with medications.      # Abdominal Pain - likely due to alcoholic gastritis, previous h/o PUD.  Resume PPI, Sucralfate.  Avoid further opiates.  CT A/P showed No bowel obstruction. Possibly gastritis. Unchanged hepatic steatosis.  # Alcohol Abuse - Pt's alcohol level still 136.  Doubt active withdrawal.  Monitor for S&S.   Librium taper as pt with known history of severe withdrawal symptoms.  Counselled on cessation.   # DVT Prophylaxis - OOB

## 2023-02-20 NOTE — ED ADULT TRIAGE NOTE - CHIEF COMPLAINT QUOTE
abdominal pain x 1 month, given rx meds by pmd with no relief, vomiting x 2 days. last drink yesterday. abdominal pain x 1 month, given rx meds by pmd with no relief, vomiting x 2 days. last drink yesterday. mild tremors in triage noted.

## 2023-02-20 NOTE — ED ADULT NURSE NOTE - OBJECTIVE STATEMENT
Received 57yr old male patient A&Ox4, breathing even and unlabored breaths. Patient c/o abdominal pain x 1 month, given medication by pmd with no relief, vomiting x 2 days. Last drink yesterday. Mild tremors in triage noted. Patient made comfortable to room 22. Ultrasound guided 20G IV placed by PAULA Carreno to right upper arm. Labs sent, meds given, no acute distress noted or verbalized.

## 2023-02-20 NOTE — PATIENT PROFILE ADULT - FUNCTIONAL ASSESSMENT - DAILY ACTIVITY 1.
How Severe Is It?: severe Is This A New Presentation, Or A Follow-Up?: Follow Up Isotretinoin Additional History: Patient completed labs August 23rd and did not hear back from us.  Prescription was not called in and she has not started Accutane yet. 4 = No assist / stand by assistance

## 2023-02-20 NOTE — ED PROCEDURE NOTE - US POC STATEMENT
The patient/family was/were informed of limited nature of the exam. Representative images were printed to be scanned into the chart or directly uploaded into the medical record. yes

## 2023-02-20 NOTE — ED PROVIDER NOTE - CROS ED GU ALL NEG
negative... Rituxan Counseling:  I discussed with the patient the risks of Rituxan infusions. Side effects can include infusion reactions, severe drug rashes including mucocutaneous reactions, reactivation of latent hepatitis and other infections and rarely progressive multifocal leukoencephalopathy.  All of the patient's questions and concerns were addressed.

## 2023-02-20 NOTE — ED PROVIDER NOTE - ABDOMINAL TENDER
No Gibson's sign/left upper quadrant/right upper quadrant/left lower quadrant/right lower quadrant/epigastric

## 2023-02-20 NOTE — H&P ADULT - NSHPLABSRESULTS_GEN_ALL_CORE
VITALS:  Vital Signs Last 24 Hrs  T(C): 36.5 (2023 17:03), Max: 37.1 (2023 15:29)  T(F): 97.7 (2023 17:03), Max: 98.8 (2023 15:29)  HR: 80 (2023 17:03) (80 - 106)  BP: 152/96 (2023 17:03) (115/72 - 152/96)  BP(mean): --  RR: 18 (2023 17:03) (18 - 20)  SpO2: 96% (2023 17:03) (96% - 98%)    Parameters below as of 2023 17:  Patient On (Oxygen Delivery Method): room air     Daily Height in cm: 170.18 (2023 08:41)    Daily Weight in k.1 (2023 17:03)  CAPILLARY BLOOD GLUCOSE      POCT Blood Glucose.: 98 mg/dL (2023 08:52)    I&O's Summary      LABS:                        12.3   5.68  )-----------( 313      ( 2023 09:40 )             39.8     02-20    139  |  106  |  12  ----------------------------<  96  4.1   |  24  |  1.09    Ca    9.5      2023 10:20  Mg     2.2     02-20    TPro  8.3  /  Alb  4.1  /  TBili  0.5  /  DBili  x   /  AST  29  /  ALT  30  /  AlkPhos  59  02-20    PT/INR - ( 2023 10:20 )   PT: 12.0 sec;   INR: 1.01 ratio         PTT - ( 2023 10:20 )  PTT:28.5 sec  LIVER FUNCTIONS - ( 2023 10:20 )  Alb: 4.1 g/dL / Pro: 8.3 gm/dL / ALK PHOS: 59 U/L / ALT: 30 U/L / AST: 29 U/L / GGT: x                     MEDICATIONS:  chlordiazePOXIDE 50 milliGRAM(s) Oral every 6 hours  chlordiazePOXIDE   Oral   LORazepam   Injectable 2 milliGRAM(s) IV Push every 1 hour PRN  pantoprazole    Tablet 40 milliGRAM(s) Oral before breakfast  sucralfate 1 Gram(s) Oral four times a day

## 2023-02-20 NOTE — H&P ADULT - HISTORY OF PRESENT ILLNESS
56yo M PMH PUD, HLD, Alcohol abuse with multiple hospitalizations, presents c/o midepigastric pain, improved after being given protonix.  Reports similar presentation in 11/2022.  Has not followed up with GI nor filled any additional medications per pt.  Continues to drink alcohol, last beverage ~ 1 day ago.  No additional complaints.     PMH: PUD, HLD, Alcohol abuse   PSH: denies   FAMILY HISTORY: reviewed and negative.  SOCIAL HISTORY: + alcohol, - IVDA, - smoking.  REVIEW OF SYSTEMS: As above.  All remaining ROS are negative.   MEDICATIONS: see medication reconciliation.

## 2023-02-20 NOTE — ED PROVIDER NOTE - CLINICAL SUMMARY MEDICAL DECISION MAKING FREE TEXT BOX
hx, exam, labs, ciwa of 8, ct abd/pelvis. pt's abd pain is most likely due to alcohol abuse lipase is normal.

## 2023-02-20 NOTE — H&P ADULT - NSHPSOURCEINFORD_GEN_ALL_CORE
INJECTION POST CALL    Procedure: Epidural TL L4-5  Radiologist(s): Dr. Tru Amanda  Date of Procedure:  1/18/23    Patient did not answer the phone left a message for patient to call IR department.      Quiana Segovia          Patient

## 2023-02-20 NOTE — ED PROVIDER NOTE - OBJECTIVE STATEMENT
57 years old male here c/o diffused intermittent abd pain x 1 month Pt also c/o n/v nonbloody bile vomitus x 2 days. Pt sts his stools were brown color. Pt admit alcohol drinking last drink was yesterday. Pt denies recent hx of trauma, headache, dizziness, blurred visions, light sensitivities, focal/distal weakness or numbness, neck/back/hips/calfs pain, cough. sob, chest pain, fever, chills, dysuria, hematuria or abnormal stool color.

## 2023-02-21 ENCOUNTER — TRANSCRIPTION ENCOUNTER (OUTPATIENT)
Age: 58
End: 2023-02-21

## 2023-02-21 VITALS
SYSTOLIC BLOOD PRESSURE: 110 MMHG | OXYGEN SATURATION: 96 % | HEART RATE: 80 BPM | DIASTOLIC BLOOD PRESSURE: 70 MMHG | TEMPERATURE: 98 F | RESPIRATION RATE: 18 BRPM

## 2023-02-21 LAB
ALBUMIN SERPL ELPH-MCNC: 3.5 G/DL — SIGNIFICANT CHANGE UP (ref 3.3–5)
ALP SERPL-CCNC: 54 U/L — SIGNIFICANT CHANGE UP (ref 40–120)
ALT FLD-CCNC: 22 U/L — SIGNIFICANT CHANGE UP (ref 12–78)
ANION GAP SERPL CALC-SCNC: 6 MMOL/L — SIGNIFICANT CHANGE UP (ref 5–17)
AST SERPL-CCNC: 27 U/L — SIGNIFICANT CHANGE UP (ref 15–37)
BILIRUB DIRECT SERPL-MCNC: 0.2 MG/DL — SIGNIFICANT CHANGE UP (ref 0–0.3)
BILIRUB INDIRECT FLD-MCNC: 0.6 MG/DL — SIGNIFICANT CHANGE UP (ref 0.2–1)
BILIRUB SERPL-MCNC: 0.8 MG/DL — SIGNIFICANT CHANGE UP (ref 0.2–1.2)
BUN SERPL-MCNC: 16 MG/DL — SIGNIFICANT CHANGE UP (ref 7–23)
CALCIUM SERPL-MCNC: 9.2 MG/DL — SIGNIFICANT CHANGE UP (ref 8.5–10.1)
CHLORIDE SERPL-SCNC: 104 MMOL/L — SIGNIFICANT CHANGE UP (ref 96–108)
CO2 SERPL-SCNC: 27 MMOL/L — SIGNIFICANT CHANGE UP (ref 22–31)
CREAT SERPL-MCNC: 1.17 MG/DL — SIGNIFICANT CHANGE UP (ref 0.5–1.3)
EGFR: 74 ML/MIN/1.73M2 — SIGNIFICANT CHANGE UP
GLUCOSE SERPL-MCNC: 105 MG/DL — HIGH (ref 70–99)
HCT VFR BLD CALC: 35.6 % — LOW (ref 39–50)
HGB BLD-MCNC: 11.1 G/DL — LOW (ref 13–17)
MAGNESIUM SERPL-MCNC: 1.9 MG/DL — SIGNIFICANT CHANGE UP (ref 1.6–2.6)
MCHC RBC-ENTMCNC: 22.2 PG — LOW (ref 27–34)
MCHC RBC-ENTMCNC: 31.2 G/DL — LOW (ref 32–36)
MCV RBC AUTO: 71.1 FL — LOW (ref 80–100)
NRBC # BLD: 0 /100 WBCS — SIGNIFICANT CHANGE UP (ref 0–0)
PHOSPHATE SERPL-MCNC: 3.3 MG/DL — SIGNIFICANT CHANGE UP (ref 2.5–4.5)
PLATELET # BLD AUTO: 263 K/UL — SIGNIFICANT CHANGE UP (ref 150–400)
POTASSIUM SERPL-MCNC: 3.9 MMOL/L — SIGNIFICANT CHANGE UP (ref 3.5–5.3)
POTASSIUM SERPL-SCNC: 3.9 MMOL/L — SIGNIFICANT CHANGE UP (ref 3.5–5.3)
PROT SERPL-MCNC: 7.3 GM/DL — SIGNIFICANT CHANGE UP (ref 6–8.3)
RBC # BLD: 5.01 M/UL — SIGNIFICANT CHANGE UP (ref 4.2–5.8)
RBC # FLD: 21 % — HIGH (ref 10.3–14.5)
SODIUM SERPL-SCNC: 137 MMOL/L — SIGNIFICANT CHANGE UP (ref 135–145)
WBC # BLD: 3.93 K/UL — SIGNIFICANT CHANGE UP (ref 3.8–10.5)
WBC # FLD AUTO: 3.93 K/UL — SIGNIFICANT CHANGE UP (ref 3.8–10.5)

## 2023-02-21 PROCEDURE — 99232 SBSQ HOSP IP/OBS MODERATE 35: CPT

## 2023-02-21 RX ORDER — PANTOPRAZOLE SODIUM 20 MG/1
1 TABLET, DELAYED RELEASE ORAL
Qty: 30 | Refills: 0
Start: 2023-02-21 | End: 2023-03-22

## 2023-02-21 RX ORDER — THIAMINE MONONITRATE (VIT B1) 100 MG
1 TABLET ORAL
Qty: 7 | Refills: 0
Start: 2023-02-21 | End: 2023-02-27

## 2023-02-21 RX ORDER — SUCRALFATE 1 G
1 TABLET ORAL
Qty: 120 | Refills: 0
Start: 2023-02-21 | End: 2023-03-22

## 2023-02-21 RX ADMIN — PANTOPRAZOLE SODIUM 40 MILLIGRAM(S): 20 TABLET, DELAYED RELEASE ORAL at 08:33

## 2023-02-21 RX ADMIN — Medication 1 GRAM(S): at 11:13

## 2023-02-21 RX ADMIN — Medication 1 GRAM(S): at 05:51

## 2023-02-21 RX ADMIN — Medication 50 MILLIGRAM(S): at 05:51

## 2023-02-21 RX ADMIN — Medication 50 MILLIGRAM(S): at 11:14

## 2023-02-21 NOTE — DISCHARGE NOTE PROVIDER - NSDCFUADDINST_GEN_ALL_CORE_FT
Stop alcohol intake.  It is important to see your primary physician as well as any specialty physicians within the next week to perform a comprehensive medical review.  Call their offices for an appointment as soon as you leave the hospital.  You will also need to see them for renewal of your medications.  If have any difficulty following with a physician, contact the [facility] [phone] or via [email or web address].   To obtain your results, you can access the [email or web address] Patient Portal at [email or web address].  Your medical issues appear to be stable at this time, but if your symptoms recur or worsen, contact your physicians and/or return to the hospital if necessary.  If you encounter any issues or questions with your medication, call your physicians before stopping the medication.  Do not drive.

## 2023-02-21 NOTE — DISCHARGE NOTE PROVIDER - NSDCMRMEDTOKEN_GEN_ALL_CORE_FT
Multiple Vitamins oral tablet: 1 tab(s) orally once a day  pantoprazole 40 mg oral delayed release tablet: 1 tab(s) orally once a day (before a meal)  sucralfate 1 g oral tablet: 1 tab(s) orally 4 times a day  thiamine 100 mg oral tablet: 1 tab(s) orally once a day

## 2023-02-21 NOTE — DISCHARGE NOTE NURSING/CASE MANAGEMENT/SOCIAL WORK - PATIENT PORTAL LINK FT
You can access the FollowMyHealth Patient Portal offered by [facility] by registering at the following website: [email or web address]. By joining [employer]’s FollowMyHealth portal, you will also be able to view your health information using other applications (apps) compatible with our system.

## 2023-02-21 NOTE — DISCHARGE NOTE PROVIDER - HOSPITAL COURSE
56yo M PMH PUD, HLD, Alcohol abuse with multiple hospitalizations, presents c/o midepigastric pain, improved after being given protonix.  Reports noncompliance with medications.      # Abdominal Pain - likely due to alcoholic gastritis, previous h/o PUD.  Resumed PPI, Sucralfate.  Symptoms have resolved.  CT A/P showed No bowel obstruction. Possibly gastritis. Unchanged hepatic steatosis.  # Alcohol Abuse - Pt's alcohol level still 136.  No s/s of acute withdrawal.  Counselled on cessation.   # DVT Prophylaxis - OOB     Stable for discharge

## 2023-02-21 NOTE — PROGRESS NOTE ADULT - SUBJECTIVE AND OBJECTIVE BOX
Patient: VANDANA VILLA [id number] 57y Male                            Hospitalist Attending Note    No complaints.  Abdominal pain resolved.  CIWA 0    ____________________PHYSICAL EXAM:  GENERAL:  NAD Alert and Oriented x 3   HEENT: NCAT  CARDIOVASCULAR:  S1, S2  LUNGS: CTAB  ABDOMEN:  soft, (-) tenderness, (-) distension, (+) bowel sounds, (-) guarding, (-) rebound (-) rigidity  EXTREMITIES:  no cyanosis / clubbing / edema.   ____________________     VITALS:  Vital Signs Last 24 Hrs  T(C): 36.4 (2023 10:50), Max: 37.1 (2023 15:29)  T(F): 97.6 (2023 10:50), Max: 98.8 (2023 15:29)  HR: 80 (2023 10:50) (77 - 101)  BP: 110/70 (2023 10:50) (101/66 - 152/96)  BP(mean): --  RR: 18 (2023 10:50) (18 - 20)  SpO2: 96% (2023 10:50) (96% - 97%)    Parameters below as of 2023 10:50  Patient On (Oxygen Delivery Method): room air     Daily     Daily Weight in k.1 (2023 17:03)  CAPILLARY BLOOD GLUCOSE        I&O's Summary      HISTORY:  PAST MEDICAL & SURGICAL HISTORY:  PUD (peptic ulcer disease)      Mixed hyperlipidemia      EtOH dependence      Gastritis      Substance abuse      DTs (delirium tremens)      No significant past surgical history      Allergies    No Known Allergies    Intolerances       LABS:                        11.1   3.93  )-----------( 263      ( 2023 07:08 )             35.6     02-    137  |  104  |  16  ----------------------------<  105<H>  3.9   |  27  |  1.17    Ca    9.2      2023 07:08  Phos  3.3     02-  Mg     1.9     -21    TPro  7.3  /  Alb  3.5  /  TBili  0.8  /  DBili  0.2  /  AST  27  /  ALT  22  /  AlkPhos  54  02-21    PT/INR - ( 2023 10:20 )   PT: 12.0 sec;   INR: 1.01 ratio         PTT - ( 2023 10:20 )  PTT:28.5 sec  LIVER FUNCTIONS - ( 2023 07:08 )  Alb: 3.5 g/dL / Pro: 7.3 gm/dL / ALK PHOS: 54 U/L / ALT: 22 U/L / AST: 27 U/L / GGT: x                     MEDICATIONS:  MEDICATIONS  (STANDING):  chlordiazePOXIDE 50 milliGRAM(s) Oral every 8 hours  chlordiazePOXIDE   Oral   pantoprazole    Tablet 40 milliGRAM(s) Oral before breakfast  sucralfate 1 Gram(s) Oral four times a day    MEDICATIONS  (PRN):  LORazepam   Injectable 2 milliGRAM(s) IV Push every 1 hour PRN CIWA-Ar score 8 or greater

## 2023-02-21 NOTE — DISCHARGE NOTE PROVIDER - DISCHARGE SERVICE FOR PATIENT
[facility]                                15 mo WELL CHILD EXAM     Agustina is a 16 m.o. female child    History given by mother     CONCERNS/QUESTIONS: yes     Chief Complaint   Patient presents with   • Well Child     both legs     Weaning breast. Gave tips    [place] legs, feet turn iwnwar on examination of gait with slight bowing. Will continue to monitor    IMMUNIZATION:  Dtap due    Immunization History   Administered Date(s) Administered   • DTAP/HIB/IPV Combined Vaccine 2020   • HIB Vaccine (ACTHIB/HIBERIX) 04/15/2021   • Hepatitis A Vaccine, Ped/Adol 04/15/2021   • Hepatitis B Vaccine (Adol/Adult) 2020, 2020, 2020   • Hib Vaccine (HbOC) - HISTORICAL DATA 2020   • IPV 2020, 2020   • MMR/Varicella Combined Vaccine 04/15/2021   • Pneumococcal Conjugate Vaccine (Prevnar/PCV-13) 2020, 2020, 2020, 04/15/2021   • Rotavirus - HISTORICAL DATA 2020, 2020, 2020       NUTRITION HISTORY:   Vegetables? Yes  Fruits?  Yes  Meats? Yes  Water? Yes  Juice? no   Milk?  Yes, Type: almond 6 oz, eats cheese and yogurt  Bottle? no    ELIMINATION:   Has multiple wet diapers per day, and BM is soft.    SLEEP PATTERN:   Sleeps through the night?  Yes  Sleeps in crib/bed? Yes   Sleeps with parent? No      SOCIAL HISTORY:     The patient lives at home with mother, father, and does not attend day care.     Patient's medications, allergies, past medical, surgical, social and family histories were reviewed and updated as appropriate.    Past Medical History:   Diagnosis Date   • GERD (gastroesophageal reflux disease)     omeprazole as a baby     Patient Active Problem List    Diagnosis Date Noted   • Gastroesophageal reflux disease 04/15/2021     History reviewed. No pertinent family history.  Current Outpatient Medications   Medication Sig Dispense Refill   • Omeprazole Magnesium 10 MG Pack Take 1 Package by mouth every day. 30 Each 2     No  "current facility-administered medications for this visit.     No Known Allergies     REVIEW OF SYSTEMS:   No complaints of HEENT, chest, GI/, skin, neuro, or musculoskeletal problems.     DEVELOPMENT:  Reviewed Growth Chart in EMR.   Walking alone? Yes  [place] and receives? Yes  Imitates others? yes  Scribbles? Yes  Uses regular cup with help? Yes  Number of words? 1  Grasps small piece of food with thumb and pointer finger? Yes   Finger feeds? Yes  Indicates wants by pointing or vocalizing? Yes  Points to show things to others? Yes  Notices if caregiver leaves or returns? Yes    ANTICIPATORY GUIDANCE  (discussed the following):   Nutrition-Whole milk until 2 years, Limit to 24 ounces/day. Limit juice to 6 ounces/day.  Cup only  Bedtime routine  Car seat safety  Routine safety measures  Routine toddler care  Signs of illness/when to call doctor   Fever precautions   Tobacco free home/car   Discipline-Time out and distraction    PHYSICAL EXAM:   Reviewed vital signs and growth parameters in EMR.     Pulse 128   Temp 36.7 °C (98 °F) (Temporal)   Resp 34   Ht 0.8 m (2' 7.5\")   Wt 9.738 kg (21 lb 7.5 oz)   HC 46.3 cm (18.23\")   SpO2 96%   BMI 15.21 kg/m²     Length - 59 %ile (Z= 0.24) based on WHO (Girls, 0-2 years) Length-for-age data based on Length recorded on 8/12/2021.  Weight - 43 %ile (Z= -0.18) based on WHO (Girls, 0-2 years) weight-for-age data using vitals from 8/12/2021.  HC - 59 %ile (Z= 0.22) based on WHO (Girls, 0-2 years) head circumference-for-age based on Head Circumference recorded on 8/12/2021.      General: This is an alert, active child in no distress.   HEAD: Normocephalic, atraumatic. Anterior fontanelle is open, soft and flat.   EYES: PERRL, positive red reflex bilaterally. No conjunctival injection or discharge. Follows well and appears to see.  EARS: TM’s are transparent with good landmarks. Canals are patent.  Appears to hear.  NOSE: Nares are patent and free of congestion.  THROAT: " Oropharynx has no lesions, moist mucus membranes. Pharynx without erythema, tonsils normal.   NECK: Supple, no cervical lymphadenopathy or masses.   HEART: Regular rate and rhythm without murmur.  LUNGS: Clear bilaterally to auscultation, no wheezes or rhonchi. No retractions, nasal flaring, or distress noted.  ABDOMEN: Normal bowel sounds, soft and non-tender without hepatomegaly or splenomegaly or masses.   GENITALIA: normal female  MUSCULOSKELETAL: Spine is straight. Extremities are without abnormalities. Moves all extremities well and symmetrically with normal tone.    NEURO: Active, alert, oriented per age.    SKIN: Intact without significant rash or birthmarks. Skin is warm, dry, and pink.     ASSESSMENT:     1. Encounter for well child check without abnormal findings  -Well Child Exam:  Healthy 16 m.o. child with good growth and development.     2. Need for vaccination  - DTaP Vaccine, less than 7 years old IM [[id number]]      PLAN:    -Anticipatory guidance was reviewed as above and age appropriate well education handout provided.  -Return to clinic for 18 month well child exam or as needed.  -Recommend multivitamin if picky eater or doesn't eat variety of foods.  -Vaccine Information statements given for each vaccine if administered. Discussed benefits and side effects of each vaccine with patient /family, answered all patient /family questions.   -See Dentist yearly. [place] with small amount of fluoride toothpaste 2-3 times a day.   on the discharge service for the patient. I have reviewed and made amendments to the documentation where necessary.

## 2023-02-21 NOTE — DISCHARGE NOTE NURSING/CASE MANAGEMENT/SOCIAL WORK - NSDCVIVACCINE_GEN_ALL_CORE_FT
COVID-19, mRNA, LNP-S, PF, 100 mcg/ 0.5 mL dose (Moderna); 10-Jovan-2021 15:38; Reji Hernandez (RN); Modern[employer] [id number] (Exp. Date: 13-Jul-2021); IntraMuscular; Deltoid Right.; 0.5 milliLiter(s);   influenza, injectable, quadrivalent, preservative free; 31-Jan-2019 15:53; Ayaka Bynum (RN); [employer]; [id number] (Exp. Date: 30-Jun-2019); IntraMuscular; Deltoid Right.; 0.5 milliLiter(s); VIS (VIS Published: 07-Aug-2015, VIS Presented: 31-Jan-2019);   influenza, injectable, quadrivalent, preservative free; 10-Nov-2019 14:13; Laverne Lane (RN); [employer]; [id number] (Exp. Date: 30-Jun-2020); IntraMuscular; Deltoid Left.; 0.5 milliLiter(s); VIS (VIS Published: 15-Aug-2019, VIS Presented: 10-Nov-2019);   influenza, injectable, quadrivalent, preservative free; 13-Oct-2020 11:56; Kimberli Cronin (RN); [employer]; [id number] (Exp. Date: 30-Jun-2021); IntraMuscular; Deltoid Right.; 0.5 milliLiter(s); VIS (VIS Published: 15-Aug-2019, VIS Presented: 13-Oct-2020);   Td (adult) preservative free; 18-Jan-2020 20:04; Yulia Vance (RN); [employer]; [id number] (Exp. Date: 19-Jan-2021); IntraMuscular; Deltoid Right.; 0.5 milliLiter(s); VIS (VIS Published: 18-Jan-2020, VIS Presented: 18-Jan-2020);

## 2023-02-21 NOTE — PROGRESS NOTE ADULT - ASSESSMENT
58yo M PMH PUD, HLD, Alcohol abuse with multiple hospitalizations, presents c/o midepigastric pain, improved after being given protonix.  Reports noncompliance with medications.      # Abdominal Pain - likely due to alcoholic gastritis, previous h/o PUD.  Resumed PPI, Sucralfate.  Symptoms have resolved.  CT A/P showed No bowel obstruction. Possibly gastritis. Unchanged hepatic steatosis.  # Alcohol Abuse - Pt's alcohol level still 136.  No s/s of acute withdrawal.  Counselled on cessation.   # DVT Prophylaxis - OOB     Stable for discharge

## 2023-02-21 NOTE — DISCHARGE NOTE PROVIDER - PROVIDER TOKENS
FREE:[LAST:[PMD],PHONE:[(   )    -],FAX:[(   )    -]],FREE:[LAST:[[facility]],PHONE:[(   )    -],FAX:[(   )    -]]

## 2023-02-21 NOTE — DISCHARGE NOTE PROVIDER - NSDCFUADDAPPT_GEN_ALL_CORE_FT
APPTS ARE READY TO BE MADE: [X] YES    Best Family or Patient Contact (if needed):    Additional Information about above appointments (if needed):    1:   2:   3:     Other comments or requests:    APPTS ARE READY TO BE MADE: [X] YES    Best Family or Patient Contact (if needed):    Additional Information about above appointments (if needed):    1:   2:   3:     Other comments or requests:     Patient is scheduled to see Dr. Saniya Nicole at 12:20pm on 2/28/23 at [address] [facility], [place] [address]  Patient is scheduled to see Dr. Gregory Bailey at 1:30pm on 3/23/23 at [address] [place] [address]

## 2023-02-21 NOTE — DISCHARGE NOTE PROVIDER - CARE PROVIDER_API CALL
PMD,   Phone: (   )    -  Fax: (   )    -  Follow Up Time:     GI Clinic,   Phone: (   )    -  Fax: (   )    -  Follow Up Time:

## 2023-02-21 NOTE — DISCHARGE NOTE NURSING/CASE MANAGEMENT/SOCIAL WORK - NSDCPEFALRISK_GEN_ALL_CORE
For information on Fall & Injury Prevention, visit: [email or web address] OR  [email or web address] OR  https://www.cdc.gov/steadi/patient.html

## 2023-02-21 NOTE — DISCHARGE NOTE PROVIDER - NSDCCPCAREPLAN_GEN_ALL_CORE_FT
PRINCIPAL DISCHARGE DIAGNOSIS  Diagnosis: Alcoholic gastritis  Assessment and Plan of Treatment:       SECONDARY DISCHARGE DIAGNOSES  Diagnosis: Alcohol dependence with withdrawal  Assessment and Plan of Treatment:     Diagnosis: Abdominal pain  Assessment and Plan of Treatment:     Diagnosis: Peptic ulcer disease  Assessment and Plan of Treatment:

## 2023-02-24 DIAGNOSIS — E78.2 MIXED HYPERLIPIDEMIA: ICD-10-CM

## 2023-02-24 DIAGNOSIS — Z87.11 PERSONAL HISTORY OF PEPTIC ULCER DISEASE: ICD-10-CM

## 2023-02-24 DIAGNOSIS — Z91.14 PATIENT'S OTHER NONCOMPLIANCE WITH MEDICATION REGIMEN: ICD-10-CM

## 2023-02-24 DIAGNOSIS — K76.0 FATTY (CHANGE OF) LIVER, NOT ELSEWHERE CLASSIFIED: ICD-10-CM

## 2023-02-24 DIAGNOSIS — K29.20 ALCOHOLIC GASTRITIS WITHOUT BLEEDING: ICD-10-CM

## 2023-02-24 DIAGNOSIS — Z82.49 FAMILY HISTORY OF ISCHEMIC HEART DISEASE AND OTHER DISEASES OF THE CIRCULATORY SYSTEM: ICD-10-CM

## 2023-02-24 DIAGNOSIS — F10.10 ALCOHOL ABUSE, UNCOMPLICATED: ICD-10-CM

## 2023-02-27 NOTE — PATIENT PROFILE ADULT - FUNCTIONAL ASSESSMENT - BASIC MOBILITY SECTION LABEL
Patient calling in stating insurance said that Jeffery Nguyen is not in network. I spoke with renae and she said for him to bring in the paperwork that states that and she will take care of it. Patient informed and verbalized his understanding.
.

## 2023-02-28 ENCOUNTER — APPOINTMENT (OUTPATIENT)
Dept: INTERNAL MEDICINE | Facility: CLINIC | Age: 58
End: 2023-02-28

## 2023-02-28 DIAGNOSIS — Z12.9 ENCOUNTER FOR SCREENING FOR MALIGNANT NEOPLASM, SITE UNSPECIFIED: ICD-10-CM

## 2023-02-28 DIAGNOSIS — Z11.3 ENCOUNTER FOR SCREENING FOR INFECTIONS WITH A PREDOMINANTLY SEXUAL MODE OF TRANSMISSION: ICD-10-CM

## 2023-02-28 DIAGNOSIS — Z23 ENCOUNTER FOR IMMUNIZATION: ICD-10-CM

## 2023-02-28 DIAGNOSIS — Z13.6 ENCOUNTER FOR SCREENING FOR CARDIOVASCULAR DISORDERS: ICD-10-CM

## 2023-02-28 DIAGNOSIS — Z13.228 ENCOUNTER FOR SCREENING FOR OTHER METABOLIC DISORDERS: ICD-10-CM

## 2023-02-28 PROBLEM — Z00.00 ENCOUNTER FOR PREVENTIVE HEALTH EXAMINATION: Status: ACTIVE | Noted: 2023-02-28

## 2023-03-02 NOTE — PATIENT PROFILE ADULT - NSTOBACCONEVERSMOKERY/N_GEN_A
Date of surgery 3/15/2023 & 3/22/2023    Type of surgery Cataract  Facility that procedure will be performed [address] name that will perform procedure Dr Burgess Mason  Anesthesia type Topical  Pre op testing date N/A   Clear patient in note of forms will be faxed to FP Yes   Point of contact in specialty office:   name GENET     phone  [phone]    fax  [phone] No

## 2023-03-06 NOTE — PATIENT PROFILE ADULT - LEGAL HELP
Doxycycline Counseling:  Patient counseled regarding possible photosensitivity and increased risk for sunburn.  Patient instructed to avoid sunlight, if possible.  When exposed to sunlight, patients should wear protective clothing, sunglasses, and sunscreen.  The patient was instructed to call the office immediately if the following severe adverse effects occur:  hearing changes, easy bruising/bleeding, severe headache, or vision changes.  The patient verbalized understanding of the proper use and possible adverse effects of doxycycline.  All of the patient's questions and concerns were addressed. High Dose Vitamin A Pregnancy And Lactation Text: High dose vitamin A therapy is contraindicated during pregnancy and breast feeding. Topical Clindamycin Pregnancy And Lactation Text: This medication is Pregnancy Category B and is considered safe during pregnancy. It is unknown if it is excreted in breast milk. Dapsone Counseling: I discussed with the patient the risks of dapsone including but not limited to hemolytic anemia, agranulocytosis, rashes, methemoglobinemia, kidney failure, peripheral neuropathy, headaches, GI upset, and liver toxicity.  Patients who start dapsone require monitoring including baseline LFTs and weekly CBCs for the first month, then every month thereafter.  The patient verbalized understanding of the proper use and possible adverse effects of dapsone.  All of the patient's questions and concerns were addressed. Isotretinoin Pregnancy And Lactation Text: This medication is Pregnancy Category X and is considered extremely dangerous during pregnancy. It is unknown if it is excreted in breast milk. Topical Retinoid Pregnancy And Lactation Text: This medication is Pregnancy Category C. It is unknown if this medication is excreted in breast milk. Aklief counseling:  Patient advised to apply a pea-sized amount only at bedtime and wait 30 minutes after washing their face before applying.  If too drying, patient may add a non-comedogenic moisturizer.  The most commonly reported side effects including irritation, redness, scaling, dryness, stinging, burning, itching, and increased risk of sunburn.  The patient verbalized understanding of the proper use and possible adverse effects of retinoids.  All of the patient's questions and concerns were addressed. Bactrim Counseling:  I discussed with the patient the risks of sulfa antibiotics including but not limited to GI upset, allergic reaction, drug rash, diarrhea, dizziness, photosensitivity, and yeast infections.  Rarely, more serious reactions can occur including but not limited to aplastic anemia, agranulocytosis, methemoglobinemia, blood dyscrasias, liver or kidney failure, lung infiltrates or desquamative/blistering drug rashes. Benzoyl Peroxide Pregnancy And Lactation Text: This medication is Pregnancy Category C. It is unknown if benzoyl peroxide is excreted in breast milk. Tetracycline Counseling: Patient counseled regarding possible photosensitivity and increased risk for sunburn.  Patient instructed to avoid sunlight, if possible.  When exposed to sunlight, patients should wear protective clothing, sunglasses, and sunscreen.  The patient was instructed to call the office immediately if the following severe adverse effects occur:  hearing changes, easy bruising/bleeding, severe headache, or vision changes.  The patient verbalized understanding of the proper use and possible adverse effects of tetracycline.  All of the patient's questions and concerns were addressed. Patient understands to avoid pregnancy while on therapy due to potential birth defects. Tazorac Pregnancy And Lactation Text: This medication is not safe during pregnancy. It is unknown if this medication is excreted in breast milk. Birth Control Pills Counseling: Birth Control Pill Counseling: I discussed with the patient the potential side effects of OCPs including but not limited to increased risk of stroke, heart attack, thrombophlebitis, deep venous thrombosis, hepatic adenomas, breast changes, GI upset, headaches, and depression.  The patient verbalized understanding of the proper use and possible adverse effects of OCPs. All of the patient's questions and concerns were addressed. Erythromycin Pregnancy And Lactation Text: This medication is Pregnancy Category B and is considered safe during pregnancy. It is also excreted in breast milk. Winlevi Counseling:  I discussed with the patient the risks of topical clascoterone including but not limited to erythema, scaling, itching, and stinging. Patient voiced their understanding. Detail Level: Detailed Azithromycin Counseling:  I discussed with the patient the risks of azithromycin including but not limited to GI upset, allergic reaction, drug rash, diarrhea, and yeast infections. Spironolactone Counseling: Patient advised regarding risks of diarrhea, abdominal pain, hyperkalemia, birth defects (for female patients), liver toxicity and renal toxicity. The patient may need blood work to monitor liver and kidney function and potassium levels while on therapy. The patient verbalized understanding of the proper use and possible adverse effects of spironolactone.  All of the patient's questions and concerns were addressed. Azelaic Acid Pregnancy And Lactation Text: This medication is considered safe during pregnancy and breast feeding. Aklief Pregnancy And Lactation Text: It is unknown if this medication is safe to use during pregnancy.  It is unknown if this medication is excreted in breast milk.  Breastfeeding women should use the topical cream on the smallest area of the skin for the shortest time needed while breastfeeding.  Do not apply to nipple and areola. Sarecycline Counseling: Patient advised regarding possible photosensitivity and discoloration of the teeth, skin, lips, tongue and gums.  Patient instructed to avoid sunlight, if possible.  When exposed to sunlight, patients should wear protective clothing, sunglasses, and sunscreen.  The patient was instructed to call the office immediately if the following severe adverse effects occur:  hearing changes, easy bruising/bleeding, severe headache, or vision changes.  The patient verbalized understanding of the proper use and possible adverse effects of sarecycline.  All of the patient's questions and concerns were addressed. Topical Sulfur Applications Counseling: Topical Sulfur Counseling: Patient counseled that this medication may cause skin irritation or allergic reactions.  In the event of skin irritation, the patient was advised to reduce the amount of the drug applied or use it less frequently.   The patient verbalized understanding of the proper use and possible adverse effects of topical sulfur application.  All of the patient's questions and concerns were addressed. Doxycycline Pregnancy And Lactation Text: This medication is Pregnancy Category D and not consider safe during pregnancy. It is also excreted in breast milk but is considered safe for shorter treatment courses. Minocycline Counseling: Patient advised regarding possible photosensitivity and discoloration of the teeth, skin, lips, tongue and gums.  Patient instructed to avoid sunlight, if possible.  When exposed to sunlight, patients should wear protective clothing, sunglasses, and sunscreen.  The patient was instructed to call the office immediately if the following severe adverse effects occur:  hearing changes, easy bruising/bleeding, severe headache, or vision changes.  The patient verbalized understanding of the proper use and possible adverse effects of minocycline.  All of the patient's questions and concerns were addressed. Dapsone Pregnancy And Lactation Text: This medication is Pregnancy Category C and is not considered safe during pregnancy or breast feeding. High Dose Vitamin A Counseling: Side effects reviewed, pt to contact office should one occur. Topical Clindamycin Counseling: Patient counseled that this medication may cause skin irritation or allergic reactions.  In the event of skin irritation, the patient was advised to reduce the amount of the drug applied or use it less frequently.   The patient verbalized understanding of the proper use and possible adverse effects of clindamycin.  All of the patient's questions and concerns were addressed. Bactrim Pregnancy And Lactation Text: This medication is Pregnancy Category D and is known to cause fetal risk.  It is also excreted in breast milk. Tetracycline Pregnancy And Lactation Text: This medication is Pregnancy Category D and not consider safe during pregnancy. It is also excreted in breast milk. Topical Retinoid counseling:  Patient advised to apply a pea-sized amount only at bedtime and wait 30 minutes after washing their face before applying.  If too drying, patient may add a non-comedogenic moisturizer. The patient verbalized understanding of the proper use and possible adverse effects of retinoids.  All of the patient's questions and concerns were addressed. Use Enhanced Medication Counseling?: No Azithromycin Pregnancy And Lactation Text: This medication is considered safe during pregnancy and is also secreted in breast milk. Isotretinoin Counseling: Patient should get monthly blood tests, not donate blood, not drive at night if vision affected, not share medication, and not undergo elective surgery for 6 months after tx completed. Side effects reviewed, pt to contact office should one occur. Birth Control Pills Pregnancy And Lactation Text: This medication should be avoided if pregnant and for the first 30 days post-partum. Tazorac Counseling:  Patient advised that medication is irritating and drying.  Patient may need to apply sparingly and wash off after an hour before eventually leaving it on overnight.  The patient verbalized understanding of the proper use and possible adverse effects of tazorac.  All of the patient's questions and concerns were addressed. Winlevi Pregnancy And Lactation Text: This medication is considered safe during pregnancy and breastfeeding. Erythromycin Counseling:  I discussed with the patient the risks of erythromycin including but not limited to GI upset, allergic reaction, drug rash, diarrhea, increase in liver enzymes, and yeast infections. Topical Sulfur Applications Pregnancy And Lactation Text: This medication is Pregnancy Category C and has an unknown safety profile during pregnancy. It is unknown if this topical medication is excreted in breast milk. Spironolactone Pregnancy And Lactation Text: This medication can cause feminization of the male fetus and should be avoided during pregnancy. The active metabolite is also found in breast milk. Benzoyl Peroxide Counseling: Patient counseled that medicine may cause skin irritation and bleach clothing.  In the event of skin irritation, the patient was advised to reduce the amount of the drug applied or use it less frequently.   The patient verbalized understanding of the proper use and possible adverse effects of benzoyl peroxide.  All of the patient's questions and concerns were addressed. Azelaic Acid Counseling: Patient counseled that medicine may cause skin irritation and to avoid applying near the eyes.  In the event of skin irritation, the patient was advised to reduce the amount of the drug applied or use it less frequently.   The patient verbalized understanding of the proper use and possible adverse effects of azelaic acid.  All of the patient's questions and concerns were addressed. no

## 2023-03-10 NOTE — ED ADULT NURSE NOTE - NS ED NURSE RECORD ANOTHER VITAL SIGN
Aleksandar presents with his parents today for evaluation of anxiety and behavioral problems.  He was referred by Dr. COCO Phillips.    HPI:Having lots of issues at school.  Transitioning to school is the biggest problem in the mornings.  Once they get him calmed down, around 10am, then he's fine for the most part.  Behaviors have gotten worse over the past two weeks.    Went to [employer], now in K.  Has always been anxious about school and teacher had commented that they had noticed it.  Up until the past several weeks (2/10) he was doing well, was going into school by himself, happy to be there.    Around 2/10, Aleksandar was sick for a few days, just a fever per parents, no sore throat.  Fever resolved after a few days, but this also correlated with dad being out of town as well.  When he went back to school, behavior snowballed- he started having 2 hour tantrums when he got to school which have gotten worse.    Parents drop off for school, when they get to school- screaming, kicking, crying, throwing things, now swearing too. Teacher comes out to meet him, quick handoff.  They've been trying having different people come out to get him.  Initially would go straight to the classroom, but then he's swearing and hitting so they've started going to another office (DENISE, ANGELI, counselor).  Even tried going in early.  Not sure what works to finally de-escalates him but he kind of gives up and then transitions to classroom and does well the rest of the day.      No significant changes at school that correlates with this, Aleksandar hasn't disclosed anything that might be a trigger either.    Prior to February- he was fine with normal dropoff procedure, greeted by the deyanira and special ed and would go in independently.      Parents describe that when he's at home in the mornings, he verbalizes wanting to have a good day, but then becomes agitated as soon as they get there.      Prior to early February,  would express dread but once he got into  school did fine.  Rare occasions of acting during the day in 4K (started at noon and he was typically a noontime jimmie) so would be tired.  End of the day would be fine.    Now- eventually once he de-escalates, he's fine in the classroom, no issues at end of the day.      Outside of school:  Baby monitor scared him a few years ago/sensitive to announcements over loud speaker or alarms.  No significant anxiety noted.  Has had a few times when he's sat and cried the whole time.  Some outbursts for parents as well, but not to specific triggers    First concerned: 4K, but manageable  Most problematic:outbursts  Precipitating Events:was sick over the weekend before this really escalated.    Previous mental health evaluations/treatments: Had seen OT for sensory last year, didn't qualify for services.     PMH: Born FT, LGA csxn, +SCN discharged with mom.  No hosp, no surg, no fx  Meds tried:none  Adverse reactions:n/a  Current medications:zyrtec  Substance use:  Denies ETOH, marijuana, nicotine, opioid, or prescription drug use or exposure    ROS:  Sleep:  Starting about 7pm asleep by 8-9pm, takes a while because he and brother.  Gets hungry in the evenings.  Sleeps through, occasionally gets up and goes in with parents (more often lately).  Up for the day on his own 6am.  Still taking a nap after school 3 times a week for a half hour or so.   Diet/Wt: picky, loves fruit, eats protein.  Doesn't eat much lunch at school-gets too busy talking   Growth: no concerns  Pain: no known issues  Development:crawled late, hated tummy time  Sensory:loud speakers.  Not particularly sensory seeking.      FamHx:  Lives with: mom, dad, 1 brother (3)  Psych Hx: D/A:  Mat gma, mat uncle, pat gma.  Dad has occasional panic attacks  ADHD:  Cousin, aunt  ASD:  Mom's cousin and niece  Mood:  Mom's uncle schizophrenia  Psych Hosp: none  Hx of suicide/attempts: none    Social Hx:  School:[employer]  Grade: K  Type of classroom: typical, no  academic concerns  IEP/504 Evals:behavior plan currently  Extracurricular activities: baseball-can stick with it.  Basketball wouldn't participate.      Mental Status Exam:  Orientation: alert and oriented x 3  Interpersonal interaction: cooperation varied during visit but overall cooperative.   Describe your best friend: Farhad- good friend, be's silly, like same things  Speech (latency, rate, volume): normal rate and rhythm  Affect: appropriate    Mood (0-10): mostly scared on school days, mostly happy  Suicidal/Homicidal Ideations: none reported  Self-Injury:none reported  Thought content (relevance, depth): age appropriate  Anxiety or Obsessive features: dat Huertas worries about school, like snack, don't like independent reading, gets stuck in ALYSSA drill  Psychotic Symptoms (strange experiences, hallucinations, delusions, paranoia, special rosario): none reported or observed  Short term memory:  Remembers 0/3 objects at 5 minutes  Apraxia (pretend to brush your teeth with your index finger): able to do  Agnosia (name what I'm holding up):able to do  Attention (months of the year backwards):Counts down from 20 with no errors    Physical Exam  Constitutional:       General: He is active. He is not in acute distress.  HENT:      Head: Normocephalic and atraumatic.      Right Ear: Tympanic membrane normal.      Left Ear: Tympanic membrane normal.      Mouth/Throat:      Mouth: Mucous membranes are moist.      Comments: Tonsils 3+ symmetrical, no erythema or exudate  Eyes:      Extraocular Movements: Extraocular movements intact.      Pupils: Pupils are equal, round, and reactive to light.   Cardiovascular:      Rate and Rhythm: Normal rate and regular rhythm.      Heart sounds: Normal heart sounds.   Pulmonary:      Effort: Pulmonary effort is normal.      Breath sounds: Normal breath sounds.   Musculoskeletal:      Cervical back: Normal range of motion.   Lymphadenopathy:      Head:      Right side of head: No  submental, submandibular, tonsillar, preauricular, posterior auricular or occipital adenopathy.      Left side of head: No submental, submandibular, tonsillar, preauricular, posterior auricular or occipital adenopathy.      Cervical: Cervical adenopathy present.      Right cervical: Posterior cervical adenopathy (firm, approx 1.5cmx 1.5 cm mobile, non-tender, no redness, no surrounding edema) present. No superficial or deep cervical adenopathy.     Left cervical: No superficial, deep or posterior cervical adenopathy.      Upper Body:      Right upper body: No supraclavicular adenopathy.      Left upper body: No supraclavicular adenopathy.   Neurological:      Mental Status: He is alert and oriented for age.      Cranial Nerves: Cranial nerves 2-12 are intact.      Sensory: Sensation is intact.      Motor: No tremor or abnormal muscle tone.      Coordination: Romberg sign negative. Finger-Nose-Finger Test abnormal (slowed and missed finger several times).      Gait: Tandem walk abnormal (even with demonstration, took far apart steps not quite in tandem).      Comments: Some difficulty with following simple directions for hand grasp, following finger for cardinal fields of vision, finger nose finger and tandem walk.  Unclear if difficulty was understanding instructions or completing task   Psychiatric:         Attention and Perception: Attention normal. He is attentive.         Mood and Affect: Mood and affect normal. Mood is not anxious.         Speech: Speech normal.         Behavior: Behavior normal. Behavior is not hyperactive. Behavior is cooperative.         Thought Content: Thought content normal. Thought content is not paranoid. Thought content does not include homicidal or suicidal ideation.         Cognition and Memory: He exhibits impaired recent memory (remembered 0/3 words at 5 minutes).         Judgment: Judgment is impulsive (on being told he might need blood drawn today, he abruptly became agitated,  throwing phone and swearing at parents, de-escalated in a few minutes).      Comments: Overall happy, smiles easily and cooperative.  Attempted all tasks requested though struggled with instructions as noted in neuro section.  Abruptly escalated and de-escalated when discussion of blood draw.         Assessment and Plan:  Behavioral Problem  Lymphadenopathy    While Aleksandar presents with a long standing history of mild anxiety symptoms, particularly around school, these have rarely interfered with functioning.  However, he presents with a history of an abrupt onset of significantly worsening anxiety and outbursts over the past month.  On further history, this abrupt onset occurred shortly after a mild febrile illness.  In addition to this abrupt onset of significant behavior issues, his parents report an enlarged lymph node that has been present for some time on the left neck (have appt soon with PCP) and on physical exam, Aleksandar exhibits coordination and memory issues.  This constellation of abrupt onset, recent illness, and movement/neuro symptoms does not meet diagnostic criteria at this time for a psychiatric diagnosis, but rather raises concerns for a possible physiological pathology that requires further investigation.  Differential diagnosis includes Pediatric Autoimmune Neuropsychiatric Disorder after Strep (PANDAS), as well as undefined autoimmune, inflammatory or oncological processes.     Screenings and scoring:  Prior to today's visit, Aleksandar's parents completed the SCARED assessment and ASQ:SE for his age.  Aleksandar reportedly refused to cooperate with completing the SCARED child assessment.  Parent SCARED assessment scores Aleksandar below the threshold for all categories except signficant school avoidance.  His parents' responses on the ASQ:SE also score him well below cutoff for concerns, and those areas of concern identified on the screening were consistent with the behaviors addressed above.        PLAN:  CBC c/diff  CMP  ESR  ASO  DNase B antibody  Labs to be drawn prior to visit with PCP next week (3/16).  If elevated ASO or DNase B, would recommend treatment for previous strep infection using azithromycin 12mg/kg/day.      If all lab work is within normal range, would recommend neurology evaluation and likely imaging to rule out other physiological etiologies.      I spent a total of 76 minutes on the day of the visit.  This includes chart review, documenting and scoring/interpretation of assessments..           Yes

## 2023-03-14 ENCOUNTER — EMERGENCY (EMERGENCY)
Facility: HOSPITAL | Age: 58
LOS: 0 days | Discharge: HOME | End: 2023-03-14
Attending: STUDENT IN AN ORGANIZED HEALTH CARE EDUCATION/TRAINING PROGRAM
Payer: MEDICAID

## 2023-03-14 VITALS
SYSTOLIC BLOOD PRESSURE: 145 MMHG | WEIGHT: 149.91 LBS | OXYGEN SATURATION: 100 % | HEART RATE: 133 BPM | TEMPERATURE: 98 F | HEIGHT: 67 IN | DIASTOLIC BLOOD PRESSURE: 91 MMHG | RESPIRATION RATE: 17 BRPM

## 2023-03-14 VITALS
RESPIRATION RATE: 17 BRPM | TEMPERATURE: 99 F | DIASTOLIC BLOOD PRESSURE: 89 MMHG | SYSTOLIC BLOOD PRESSURE: 136 MMHG | HEART RATE: 96 BPM | OXYGEN SATURATION: 96 %

## 2023-03-14 DIAGNOSIS — R11.2 NAUSEA WITH VOMITING, UNSPECIFIED: ICD-10-CM

## 2023-03-14 DIAGNOSIS — F10.10 ALCOHOL ABUSE, UNCOMPLICATED: ICD-10-CM

## 2023-03-14 DIAGNOSIS — Z87.11 PERSONAL HISTORY OF PEPTIC ULCER DISEASE: ICD-10-CM

## 2023-03-14 DIAGNOSIS — Z20.822 CONTACT WITH AND (SUSPECTED) EXPOSURE TO COVID-19: ICD-10-CM

## 2023-03-14 DIAGNOSIS — R10.13 EPIGASTRIC PAIN: ICD-10-CM

## 2023-03-14 DIAGNOSIS — E78.5 HYPERLIPIDEMIA, UNSPECIFIED: ICD-10-CM

## 2023-03-14 DIAGNOSIS — R00.0 TACHYCARDIA, UNSPECIFIED: ICD-10-CM

## 2023-03-14 LAB
ALBUMIN SERPL ELPH-MCNC: 4 G/DL — SIGNIFICANT CHANGE UP (ref 3.3–5)
ALP SERPL-CCNC: 64 U/L — SIGNIFICANT CHANGE UP (ref 40–120)
ALT FLD-CCNC: 29 U/L — SIGNIFICANT CHANGE UP (ref 12–78)
ANION GAP SERPL CALC-SCNC: 12 MMOL/L — SIGNIFICANT CHANGE UP (ref 5–17)
ANISOCYTOSIS BLD QL: SLIGHT — SIGNIFICANT CHANGE UP
AST SERPL-CCNC: 32 U/L — SIGNIFICANT CHANGE UP (ref 15–37)
BASOPHILS # BLD AUTO: 0.05 K/UL — SIGNIFICANT CHANGE UP (ref 0–0.2)
BASOPHILS NFR BLD AUTO: 1.2 % — SIGNIFICANT CHANGE UP (ref 0–2)
BILIRUB SERPL-MCNC: 0.5 MG/DL — SIGNIFICANT CHANGE UP (ref 0.2–1.2)
BUN SERPL-MCNC: 15 MG/DL — SIGNIFICANT CHANGE UP (ref 7–23)
CALCIUM SERPL-MCNC: 9.9 MG/DL — SIGNIFICANT CHANGE UP (ref 8.5–10.1)
CHLORIDE SERPL-SCNC: 108 MMOL/L — SIGNIFICANT CHANGE UP (ref 96–108)
CO2 SERPL-SCNC: 19 MMOL/L — LOW (ref 22–31)
CREAT SERPL-MCNC: 1.19 MG/DL — SIGNIFICANT CHANGE UP (ref 0.5–1.3)
EGFR: 72 ML/MIN/1.73M2 — SIGNIFICANT CHANGE UP
EOSINOPHIL # BLD AUTO: 0.02 K/UL — SIGNIFICANT CHANGE UP (ref 0–0.5)
EOSINOPHIL NFR BLD AUTO: 0.5 % — SIGNIFICANT CHANGE UP (ref 0–6)
FLUAV AG NPH QL: SIGNIFICANT CHANGE UP
FLUBV AG NPH QL: SIGNIFICANT CHANGE UP
GLUCOSE SERPL-MCNC: 95 MG/DL — SIGNIFICANT CHANGE UP (ref 70–99)
HCT VFR BLD CALC: 40.6 % — SIGNIFICANT CHANGE UP (ref 39–50)
HGB BLD-MCNC: 12.7 G/DL — LOW (ref 13–17)
IMM GRANULOCYTES NFR BLD AUTO: 0.2 % — SIGNIFICANT CHANGE UP (ref 0–0.9)
LIDOCAIN IGE QN: 296 U/L — SIGNIFICANT CHANGE UP (ref 73–393)
LYMPHOCYTES # BLD AUTO: 1.56 K/UL — SIGNIFICANT CHANGE UP (ref 1–3.3)
LYMPHOCYTES # BLD AUTO: 37.2 % — SIGNIFICANT CHANGE UP (ref 13–44)
MANUAL SMEAR VERIFICATION: SIGNIFICANT CHANGE UP
MCHC RBC-ENTMCNC: 22.2 PG — LOW (ref 27–34)
MCHC RBC-ENTMCNC: 31.3 G/DL — LOW (ref 32–36)
MCV RBC AUTO: 70.9 FL — LOW (ref 80–100)
MICROCYTES BLD QL: SIGNIFICANT CHANGE UP
MONOCYTES # BLD AUTO: 0.44 K/UL — SIGNIFICANT CHANGE UP (ref 0–0.9)
MONOCYTES NFR BLD AUTO: 10.5 % — SIGNIFICANT CHANGE UP (ref 2–14)
NEUTROPHILS # BLD AUTO: 2.11 K/UL — SIGNIFICANT CHANGE UP (ref 1.8–7.4)
NEUTROPHILS NFR BLD AUTO: 50.4 % — SIGNIFICANT CHANGE UP (ref 43–77)
NRBC # BLD: 0 /100 WBCS — SIGNIFICANT CHANGE UP (ref 0–0)
PLAT MORPH BLD: NORMAL — SIGNIFICANT CHANGE UP
PLATELET # BLD AUTO: 319 K/UL — SIGNIFICANT CHANGE UP (ref 150–400)
POTASSIUM SERPL-MCNC: 3.7 MMOL/L — SIGNIFICANT CHANGE UP (ref 3.5–5.3)
POTASSIUM SERPL-SCNC: 3.7 MMOL/L — SIGNIFICANT CHANGE UP (ref 3.5–5.3)
PROT SERPL-MCNC: 8.5 GM/DL — HIGH (ref 6–8.3)
RBC # BLD: 5.73 M/UL — SIGNIFICANT CHANGE UP (ref 4.2–5.8)
RBC # FLD: 22.2 % — HIGH (ref 10.3–14.5)
RBC BLD AUTO: ABNORMAL
SARS-COV-2 RNA SPEC QL NAA+PROBE: SIGNIFICANT CHANGE UP
SODIUM SERPL-SCNC: 139 MMOL/L — SIGNIFICANT CHANGE UP (ref 135–145)
TROPONIN I, HIGH SENSITIVITY RESULT: 7.9 NG/L — SIGNIFICANT CHANGE UP
WBC # BLD: 4.19 K/UL — SIGNIFICANT CHANGE UP (ref 3.8–10.5)
WBC # FLD AUTO: 4.19 K/UL — SIGNIFICANT CHANGE UP (ref 3.8–10.5)

## 2023-03-14 PROCEDURE — 93010 ELECTROCARDIOGRAM REPORT: CPT

## 2023-03-14 PROCEDURE — 36000 PLACE NEEDLE IN VEIN: CPT

## 2023-03-14 PROCEDURE — 99285 EMERGENCY DEPT VISIT HI MDM: CPT | Mod: 25

## 2023-03-14 RX ORDER — METOCLOPRAMIDE HCL 10 MG
10 TABLET ORAL ONCE
Refills: 0 | Status: COMPLETED | OUTPATIENT
Start: 2023-03-14 | End: 2023-03-14

## 2023-03-14 RX ORDER — ONDANSETRON 8 MG/1
4 TABLET, FILM COATED ORAL ONCE
Refills: 0 | Status: COMPLETED | OUTPATIENT
Start: 2023-03-14 | End: 2023-03-14

## 2023-03-14 RX ORDER — SODIUM CHLORIDE 9 MG/ML
2000 INJECTION INTRAMUSCULAR; INTRAVENOUS; SUBCUTANEOUS ONCE
Refills: 0 | Status: COMPLETED | OUTPATIENT
Start: 2023-03-14 | End: 2023-03-14

## 2023-03-14 RX ORDER — KETOROLAC TROMETHAMINE 30 MG/ML
15 SYRINGE (ML) INJECTION ONCE
Refills: 0 | Status: DISCONTINUED | OUTPATIENT
Start: 2023-03-14 | End: 2023-03-14

## 2023-03-14 RX ORDER — KETOROLAC TROMETHAMINE 30 MG/ML
30 SYRINGE (ML) INJECTION ONCE
Refills: 0 | Status: DISCONTINUED | OUTPATIENT
Start: 2023-03-14 | End: 2023-03-14

## 2023-03-14 RX ORDER — FAMOTIDINE 10 MG/ML
20 INJECTION INTRAVENOUS ONCE
Refills: 0 | Status: COMPLETED | OUTPATIENT
Start: 2023-03-14 | End: 2023-03-14

## 2023-03-14 RX ADMIN — Medication 25 MILLIGRAM(S): at 17:39

## 2023-03-14 RX ADMIN — Medication 15 MILLIGRAM(S): at 16:18

## 2023-03-14 RX ADMIN — ONDANSETRON 4 MILLIGRAM(S): 8 TABLET, FILM COATED ORAL at 13:32

## 2023-03-14 RX ADMIN — Medication 2 MILLIGRAM(S): at 13:52

## 2023-03-14 RX ADMIN — FAMOTIDINE 20 MILLIGRAM(S): 10 INJECTION INTRAVENOUS at 13:31

## 2023-03-14 RX ADMIN — SODIUM CHLORIDE 2000 MILLILITER(S): 9 INJECTION INTRAMUSCULAR; INTRAVENOUS; SUBCUTANEOUS at 13:32

## 2023-03-14 RX ADMIN — Medication 10 MILLIGRAM(S): at 16:18

## 2023-03-14 RX ADMIN — Medication 30 MILLIGRAM(S): at 14:30

## 2023-03-14 NOTE — ED PROVIDER NOTE - NS ED ROS FT
Review of Systems    Constitutional: (-) fever   Cardiovascular: (-) chest pain, (-) syncope (-) palpitations  Respiratory: (-) cough, (-) shortness of breath  Gastrointestinal: (+) vomiting, (-) diarrhea (-)black/bloody stools (+) abdominal pain  Genitourinary:  (-) dysuria   Musculoskeletal: (-) neck pain, (-) back pain, (-) leg pain/swelling  Integumentary: (-) rash, (-) edema  Neurological: (-) headache  Hematologic: (-) easy bruising

## 2023-03-14 NOTE — ED ADULT TRIAGE NOTE - CHIEF COMPLAINT QUOTE
pt a&o x4 walk in from home, placed in wheelchair, pt states last drink 2-3 days, ago. pt c.o of abd pain n/v today. hr 133 no shortness of breath

## 2023-03-14 NOTE — ED ADULT NURSE NOTE - OBJECTIVE STATEMENT
pt present po the ED  c/o of abd pain n/v today. hr 133 no shortness of breath.  history of alcoholism. last drink 2-3 day ago. noted with tremors and body ache.

## 2023-03-14 NOTE — ED PROVIDER NOTE - PHYSICAL EXAMINATION
PHYSICAL EXAM:    GENERAL: Alert, appears stated age, well appearing, non-toxic  SKIN: Warm, pink and dry.  HEAD: NC, AT, no step offs   EYE: Normal lids/conjunctiva  ENT: Normal hearing, patent oropharynx  NECK: +supple. No meningismus  Pulm: Bilateral BS, normal resp effort, no wheezes, stridor, or retractions  CV: RRR, no M/R/G, 2+and = radial pulses  Abd: soft, +mild epigastric ttp. no rebound/guarding, non-distended no CVA tenderness.   Mskel: no erythema, cyanosis, edema. no calf tenderness  Neuro: AAOx3, no tongue fasciculations. 5/5 strength throughout. +mild tremor.

## 2023-03-14 NOTE — ED PROVIDER NOTE - PATIENT PORTAL LINK FT
You can access the FollowMyHealth Patient Portal offered by [facility] by registering at the following website: [email or web address]. By joining [facility]’s FollowMyHealth portal, you will also be able to view your health information using other applications (apps) compatible with our system.

## 2023-03-14 NOTE — ED PROVIDER NOTE - ATTENDING CONTRIBUTION TO CARE
PA saw patient primarily. I reviewed the case, blood work with the PA and agree with evaluation and plan of care as written.

## 2023-03-14 NOTE — ED ADULT NURSE NOTE - NSHOSCREENINGQ1_ED_ALL_ED
MTM referral from: Transitions of Care (recent hospital discharge or ED visit)    MTM referral outreach attempt #2 on November 1, 2019 at 8:57 AM      Outcome: Patient is not interested at this time because they are a [facility] patient, will route to MTM Pharmacist/Provider as an FYI. Thank you for the referral.     Kimberly Nelson, MTM Coordinator      
No

## 2023-03-14 NOTE — ED PROCEDURE NOTE - ACCESS TYPE
r humeral area 20 g/Peripheral Venous Report received from DENIS Cho RN. Patient AA&Ox3. Breathing nonlabored to room air. Denies chest pain or SOB. On tele monitoring. IVHL to right AC intact and patent with no signs of infiltration observed. Patient in no distress. Report given to wilfredo EDDY by DARA Dailey RN.

## 2023-03-14 NOTE — ED PROVIDER NOTE - OBJECTIVE STATEMENT
7-year-old male with PMH HLD, peptic ulcer disease, alcohol abuse, multiple hospitalizations in the past presents with epigastric pain, nausea multiple episodes of nonbloody nonbilious vomiting x today.   no palliating/provoking factors.   He relates last drink was a few days ago.  No fever, lower abdominal pain, diarrhea, chest pain, shortness of breath, rash, history of abdominal surgeries

## 2023-03-14 NOTE — ED PROVIDER NOTE - CLINICAL SUMMARY MEDICAL DECISION MAKING FREE TEXT BOX
+alcohol abuse hx with nausea , multiple episodes of vomiting, likely gastritis and some component of withdrawal   last drink days ago per pt, ativan/fluids/pepcid/toradol  will give librium and dc   ciwa 2  counseled on detox options.   rpt abd exam nontender. tolerating po.   Reviewed all results and necessity for follow up. Counseled on red flags and to return for them.  Patient appears well on discharge.

## 2023-03-23 ENCOUNTER — APPOINTMENT (OUTPATIENT)
Dept: GASTROENTEROLOGY | Facility: CLINIC | Age: 58
End: 2023-03-23

## 2023-03-23 NOTE — ED ADULT TRIAGE NOTE - HISTORY OF COVID-19 VACCINATION
Vaccine status unknown Solaraze Pregnancy And Lactation Text: This medication is Pregnancy Category B and is considered safe. There is some data to suggest avoiding during the third trimester. It is unknown if this medication is excreted in breast milk.

## 2023-03-27 NOTE — ED ADULT NURSE NOTE - NSFALLRSKASSESASSIST_ED_ALL_ED
03/27/2023  Nubia Aparicio is a 19 m.o., female.      Pre-op Assessment    I have reviewed the Patient Summary Reports.     I have reviewed the Nursing Notes. I have reviewed the NPO Status.   I have reviewed the Medications.     Review of Systems  EENT/Dental:   adenoids Otitis Media   Cardiovascular:  Cardiovascular Normal     Renal/:  Renal/ Normal     Hepatic/GI:  Hepatic/GI Normal    Neurological:  Neurology Normal    Endocrine:  Endocrine Normal        Physical Exam  General: Well nourished    Airway:  Mallampati: II   Neck ROM: Normal ROM    Dental:  Intact    Chest/Lungs:  Clear to auscultation, Normal Respiratory Rate    Heart:  Rate: Normal  Rhythm: Regular Rhythm        Anesthesia Plan  Type of Anesthesia, risks & benefits discussed:    Anesthesia Type: Gen ETT  Intra-op Monitoring Plan: Standard ASA Monitors  Post Op Pain Control Plan: multimodal analgesia and IV/PO Opioids PRN  Induction:  Inhalation and IV  Informed Consent: Informed consent signed with the Patient representative and all parties understand the risks and agree with anesthesia plan.  All questions answered.   ASA Score: 1    Ready For Surgery From Anesthesia Perspective.     .      
no

## 2023-03-29 NOTE — ED ADULT NURSE NOTE - NSFALLRSKINDICTYPE_ED_ALL_ED
[address] [place] [address] [address] Intoxication

## 2023-04-04 NOTE — PATIENT PROFILE ADULT - NSPROSPHOSPCHAPLAINYN_GEN_A_NUR
Pt arrived today at 1300 for IV iron. Pt blood pressure elevated. MD made aware. Bp remained high after recheck. Ordering provider states no iron today due to elevated bp and for pt to come back next week. Pt informed.    no

## 2023-04-13 NOTE — H&P ADULT - NSHPSOCIALHISTORY_GEN_ALL_CORE
What Type Of Note Output Would You Prefer (Optional)?: Bullet Format
Is This A New Presentation, Or A Follow-Up?: Rash
Denies tobacco, illicit drugs.  Drinks heavily, ~3 large bottles of rum per week.

## 2023-05-01 NOTE — ED PROVIDER NOTE - NS ED MD DISPO DISCHARGE
Pt called to have Vivelle dot patches submitted to new mail order pharmacy.  Prescription submitted.  Pt notified.     Home

## 2023-05-03 NOTE — ED ADULT NURSE NOTE - NS PRO AD NO ADVANCE DIRECTIVE
GYN Operative Note    Patient Name, age and sex:  Madonna Maldonado, 35 y.o., female  Procedure Date:  5/3/2023    Surgeon(s) and Role:     * Tierney Gil MD - Primary    Additional Staff:Quyen WHITE  Medically necessary for assistance.Complex retraction. Suturing skills.Complex and critical patient postioning.  Laparoscopic instrument use and manipulation.     * No Diagnosis Codes entered *     PREOP DX;  Pelvic pain ,  Dysmenorrhea, endometriosis, desires sterilization    POSTOP DX :same plus endometriosis cyst left ovary.     * No Diagnosis Codes entered *    Procedure(s):  LAPAROSCOPIC BILATERAL SALPINGECTOMY WITH  ASPIRATION OF LEFT OVARIAN CYST AND LYSIS OF ADHESIONS    Indications for Operation: 35-year-old female with severe dysmenorrhea cyclic pelvic pain suggestive of endometriosis who desires surgical management by diagnostic laparoscopy and fulguration or excision of endometriosis as well as permanent sterilization by salpingectomy.  The response alternatives all discussed patient agrees consents to procedure and the risks thereof.    Antibiotics:  cefazolin (Ancef) ordered on call to OR    Anesthesia:  Type: General -   ASA:  III    Operative Findings: The uterus is small and nulliparous appearing.  The laparoscopic visualization confirms stage III endometriosis with some adhesions and scarring of the uterosacral ligaments and cul-de-sac.  The left ovary had a endometriotic cyst within the ovary.  There was some adhesions of the left ovary to the pelvic sidewall and  posterior side of the uterus.  The right ovary was free and normal.    Specimens Removed:    Specimens     ID Source Type Tests Collected By Collected At Frozen?    A Fallopian Tubes, Bilateral Tissue · TISSUE PATHOLOGY EXAM   Tierney Gil MD 5/3/23 1255 No    This specimen was not marked as sent.          Estimated Blood Loss: Minimal mL    Urine Output: 100 mL    Complications: None    Procedure  Narrative: Patient was taken the operating room and general anesthesia was found be adequate.  She was then prepped and draped normal sterile fashion dorsolithotomy position in the yellowfin stirrups.  The acorn uterine manipulator was then placed into the uterus for manipulation vaginally.  Attention was then turned the abdomen sterile gloves a supraumbilical incision was then made after injecting with local.  The abdominal wall was tented up and the varies needle was placed.  I confirmed place with a drop saline test and low patient pressure CO2.  The optical trocar was then used to enter into the abdominal cavity under direct visualization.  Patient was placed in Trendelenburg.  The assistant laparoscopic trocars were then placed a 5 mm on the right lateral pelvis and a 8 mm on the left under direct visualization and transillumination.  The survey of the abdomen pelvis was performed.  There was noted to have the adhesions of the cystic left ovary.  These were lysed with free with the LigaSure and laparoscopic scissors.  The cyst on the left ovary was then incised with the laparoscopic scissors and a endometriotic bloody material was seen within it.  I coagulated the edges of the endometriotic cyst and evacuated the contents and cauterized the cyst lining with the LigaSure device.  Once this ovary was freed there were another set of adhesions around the uterosacral ligament.  These were lysed free with the LigaSure as well.  They were noted to be well away from the ureters.  Once completion of mobilization was performed of the uterus and evacuation of the endometriotic ovarian cyst then the fallopian tubes were removed with the LigaSure device for the entire length of the fallopian tube and both of them were sent to pathology.  Copious irrigation was then performed suctioned dry hemostasis was confirmed.  Low CO2 pressure was then obtained and hemostasis still confirmed.  Then the lateral trocars were removed  hemostasis confirmed there and the pneumoperitoneum was released from the supraumbilical incision.  Then the patient was out of lithotomy position final evacuation of the CO2 gas was performed through the trocar.  Then the trocars were removed skin incision closed with subcuticular Monocryl and skin afix.  The uterine manipulator was removed from the vagina and cervix.  Cervix visualized hemostatic.  She was then taken out of lithotomy position final count was correct.  She was then awoken from general anesthesia and taken to recovery in stable condition.    Tierney Gil MD - 5/3/2023, 13:55 EDT     No

## 2023-05-03 NOTE — DISCHARGE NOTE NURSING/CASE MANAGEMENT/SOCIAL WORK - NSTOBACCONEVERSMOKERY/N_GEN_A
APPEARANCE: Pt clean and well groomed with clothes appropriately fastened. No distress is noted.    NEURO: Pt is awake and alert x3, Pt follows commands and affect is appropriate. Pt is moving all extremities well and sensation is intact. Speech is clear.    RESPIRATORY: Respirations are even and unlabored on room air. No accessory muscle use noted. Normal rate and effort is noted. Pt placed on continuous pulse ox with O2 sats noted at  94% on room air.    CARDIAC: Pt placed on cardiac monitor. Sinus rhythm noted. Rate is normal. No abnormal heart sounds noted. Radial and dorsalis pedis pulses are palpable and +2. No edema noted. Cap refill is < 2    GASTRO: Pt denies abdominal pain/tenderness. States bowel movements have been regular. Bowel sounds are present and normal.     : Pt denies any pain or frequency with urination.    SKIN: Skin is warm, dry and intact. Skin color is appropriate for ethnicity. Normal skin turgor noted. Mucous membranes are moist.     MUSCULOSKELETAL: No abnormalities are noted.     Pt presents to ER with c/o cough, hoarseness, sob, and fever. Sob worse with exertion. Ongoing for 3 days. O2 is 94% on room air.  
No

## 2023-05-09 ENCOUNTER — INPATIENT (INPATIENT)
Facility: HOSPITAL | Age: 58
LOS: 1 days | Discharge: HOME | End: 2023-05-11
Attending: INTERNAL MEDICINE | Admitting: INTERNAL MEDICINE
Payer: MEDICAID

## 2023-05-09 VITALS
SYSTOLIC BLOOD PRESSURE: 135 MMHG | HEIGHT: 67 IN | DIASTOLIC BLOOD PRESSURE: 98 MMHG | OXYGEN SATURATION: 96 % | RESPIRATION RATE: 19 BRPM | HEART RATE: 99 BPM | TEMPERATURE: 98 F | WEIGHT: 175.05 LBS

## 2023-05-09 LAB
ALBUMIN SERPL ELPH-MCNC: 3.9 G/DL — SIGNIFICANT CHANGE UP (ref 3.3–5)
ALP SERPL-CCNC: 61 U/L — SIGNIFICANT CHANGE UP (ref 40–120)
ALT FLD-CCNC: 32 U/L — SIGNIFICANT CHANGE UP (ref 12–78)
AMPHET UR-MCNC: NEGATIVE — SIGNIFICANT CHANGE UP
ANION GAP SERPL CALC-SCNC: 13 MMOL/L — SIGNIFICANT CHANGE UP (ref 5–17)
ANISOCYTOSIS BLD QL: SLIGHT — SIGNIFICANT CHANGE UP
APAP SERPL-MCNC: < 2 UG/ML (ref 10–30)
APPEARANCE UR: CLEAR — SIGNIFICANT CHANGE UP
AST SERPL-CCNC: 49 U/L — HIGH (ref 15–37)
BACTERIA # UR AUTO: ABNORMAL
BARBITURATES UR SCN-MCNC: NEGATIVE — SIGNIFICANT CHANGE UP
BASOPHILS # BLD AUTO: 0.04 K/UL — SIGNIFICANT CHANGE UP (ref 0–0.2)
BASOPHILS NFR BLD AUTO: 0.8 % — SIGNIFICANT CHANGE UP (ref 0–2)
BENZODIAZ UR-MCNC: NEGATIVE — SIGNIFICANT CHANGE UP
BILIRUB SERPL-MCNC: 0.5 MG/DL — SIGNIFICANT CHANGE UP (ref 0.2–1.2)
BILIRUB UR-MCNC: NEGATIVE — SIGNIFICANT CHANGE UP
BUN SERPL-MCNC: 11 MG/DL — SIGNIFICANT CHANGE UP (ref 7–23)
CALCIUM SERPL-MCNC: 9 MG/DL — SIGNIFICANT CHANGE UP (ref 8.5–10.1)
CHLORIDE SERPL-SCNC: 107 MMOL/L — SIGNIFICANT CHANGE UP (ref 96–108)
CO2 SERPL-SCNC: 21 MMOL/L — LOW (ref 22–31)
COCAINE METAB.OTHER UR-MCNC: NEGATIVE — SIGNIFICANT CHANGE UP
COLOR SPEC: YELLOW — SIGNIFICANT CHANGE UP
CREAT SERPL-MCNC: 1.25 MG/DL — SIGNIFICANT CHANGE UP (ref 0.5–1.3)
DIFF PNL FLD: NEGATIVE — SIGNIFICANT CHANGE UP
EGFR: 68 ML/MIN/1.73M2 — SIGNIFICANT CHANGE UP
EOSINOPHIL # BLD AUTO: 0.13 K/UL — SIGNIFICANT CHANGE UP (ref 0–0.5)
EOSINOPHIL NFR BLD AUTO: 2.6 % — SIGNIFICANT CHANGE UP (ref 0–6)
ETHANOL SERPL-MCNC: 264 MG/DL — HIGH (ref 0–10)
GLUCOSE SERPL-MCNC: 102 MG/DL — HIGH (ref 70–99)
GLUCOSE UR QL: 50 MG/DL
HCT VFR BLD CALC: 42.6 % — SIGNIFICANT CHANGE UP (ref 39–50)
HGB BLD-MCNC: 13.4 G/DL — SIGNIFICANT CHANGE UP (ref 13–17)
IMM GRANULOCYTES NFR BLD AUTO: 0.2 % — SIGNIFICANT CHANGE UP (ref 0–0.9)
KETONES UR-MCNC: NEGATIVE — SIGNIFICANT CHANGE UP
LEUKOCYTE ESTERASE UR-ACNC: NEGATIVE — SIGNIFICANT CHANGE UP
LIDOCAIN IGE QN: 212 U/L — SIGNIFICANT CHANGE UP (ref 73–393)
LYMPHOCYTES # BLD AUTO: 2.98 K/UL — SIGNIFICANT CHANGE UP (ref 1–3.3)
LYMPHOCYTES # BLD AUTO: 59.8 % — HIGH (ref 13–44)
MANUAL SMEAR VERIFICATION: SIGNIFICANT CHANGE UP
MCHC RBC-ENTMCNC: 22.5 PG — LOW (ref 27–34)
MCHC RBC-ENTMCNC: 31.5 G/DL — LOW (ref 32–36)
MCV RBC AUTO: 71.5 FL — LOW (ref 80–100)
METHADONE UR-MCNC: NEGATIVE — SIGNIFICANT CHANGE UP
MICROCYTES BLD QL: SIGNIFICANT CHANGE UP
MONOCYTES # BLD AUTO: 0.34 K/UL — SIGNIFICANT CHANGE UP (ref 0–0.9)
MONOCYTES NFR BLD AUTO: 6.8 % — SIGNIFICANT CHANGE UP (ref 2–14)
NEUTROPHILS # BLD AUTO: 1.48 K/UL — LOW (ref 1.8–7.4)
NEUTROPHILS NFR BLD AUTO: 29.8 % — LOW (ref 43–77)
NITRITE UR-MCNC: NEGATIVE — SIGNIFICANT CHANGE UP
NRBC # BLD: 0 /100 WBCS — SIGNIFICANT CHANGE UP (ref 0–0)
OPIATES UR-MCNC: NEGATIVE — SIGNIFICANT CHANGE UP
PCP SPEC-MCNC: SIGNIFICANT CHANGE UP
PCP UR-MCNC: NEGATIVE — SIGNIFICANT CHANGE UP
PH UR: 6 — SIGNIFICANT CHANGE UP (ref 5–8)
PLAT MORPH BLD: NORMAL — SIGNIFICANT CHANGE UP
PLATELET # BLD AUTO: 328 K/UL — SIGNIFICANT CHANGE UP (ref 150–400)
POTASSIUM SERPL-MCNC: 4.5 MMOL/L — SIGNIFICANT CHANGE UP (ref 3.5–5.3)
POTASSIUM SERPL-SCNC: 4.5 MMOL/L — SIGNIFICANT CHANGE UP (ref 3.5–5.3)
PROT SERPL-MCNC: 8.8 GM/DL — HIGH (ref 6–8.3)
PROT UR-MCNC: 30 MG/DL
RBC # BLD: 5.96 M/UL — HIGH (ref 4.2–5.8)
RBC # FLD: 20 % — HIGH (ref 10.3–14.5)
RBC BLD AUTO: ABNORMAL
RBC CASTS # UR COMP ASSIST: NEGATIVE /HPF — SIGNIFICANT CHANGE UP (ref 0–4)
SALICYLATES SERPL-MCNC: <1.7 MG/DL — LOW (ref 2.8–20)
SODIUM SERPL-SCNC: 141 MMOL/L — SIGNIFICANT CHANGE UP (ref 135–145)
SP GR SPEC: 1.01 — SIGNIFICANT CHANGE UP (ref 1.01–1.02)
THC UR QL: NEGATIVE — SIGNIFICANT CHANGE UP
TSH SERPL-MCNC: 0.65 UIU/ML — SIGNIFICANT CHANGE UP (ref 0.36–3.74)
UROBILINOGEN FLD QL: NEGATIVE MG/DL — SIGNIFICANT CHANGE UP
WBC # BLD: 4.98 K/UL — SIGNIFICANT CHANGE UP (ref 3.8–10.5)
WBC # FLD AUTO: 4.98 K/UL — SIGNIFICANT CHANGE UP (ref 3.8–10.5)
WBC UR QL: SIGNIFICANT CHANGE UP

## 2023-05-09 PROCEDURE — 93010 ELECTROCARDIOGRAM REPORT: CPT

## 2023-05-09 PROCEDURE — 99285 EMERGENCY DEPT VISIT HI MDM: CPT

## 2023-05-09 RX ORDER — KETOROLAC TROMETHAMINE 30 MG/ML
15 SYRINGE (ML) INJECTION ONCE
Refills: 0 | Status: DISCONTINUED | OUTPATIENT
Start: 2023-05-09 | End: 2023-05-09

## 2023-05-09 RX ORDER — MORPHINE SULFATE 50 MG/1
4 CAPSULE, EXTENDED RELEASE ORAL ONCE
Refills: 0 | Status: DISCONTINUED | OUTPATIENT
Start: 2023-05-09 | End: 2023-05-09

## 2023-05-09 RX ORDER — FAMOTIDINE 10 MG/ML
20 INJECTION INTRAVENOUS ONCE
Refills: 0 | Status: DISCONTINUED | OUTPATIENT
Start: 2023-05-09 | End: 2023-05-09

## 2023-05-09 RX ORDER — FAMOTIDINE 10 MG/ML
20 INJECTION INTRAVENOUS ONCE
Refills: 0 | Status: COMPLETED | OUTPATIENT
Start: 2023-05-09 | End: 2023-05-09

## 2023-05-09 RX ADMIN — MORPHINE SULFATE 4 MILLIGRAM(S): 50 CAPSULE, EXTENDED RELEASE ORAL at 20:43

## 2023-05-09 RX ADMIN — FAMOTIDINE 20 MILLIGRAM(S): 10 INJECTION INTRAVENOUS at 20:42

## 2023-05-09 RX ADMIN — Medication 30 MILLILITER(S): at 18:46

## 2023-05-09 RX ADMIN — Medication 15 MILLIGRAM(S): at 20:43

## 2023-05-09 NOTE — ED ADULT NURSE NOTE - OBJECTIVE STATEMENT
as per pt, "I have bad pain to my stomach I have bad , bad pain", pt c/o pain to stomach, pt denies nausea , pt denies vomiting at this time

## 2023-05-09 NOTE — ED PROVIDER NOTE - NS ED ROS FT
Gen: No fever, normal appetite  Eyes: No eye irritation or discharge  ENT: No ear pain, congestion, sore throat  Resp: No cough or trouble breathing  Cardiovascular: No chest pain or palpitation  Gastroenteric: see HPI   :  No change in urine output; no dysuria  MS: No joint or muscle pain  Skin: No rashes  Neuro: No headache; no abnormal movements  Remainder negative, except as per the HPI None

## 2023-05-09 NOTE — ED PROVIDER NOTE - OBJECTIVE STATEMENT
58M with complex care note in chart - chronic hx of etoh abuse and multiple admissions to the ED/ hospital with complaints of abdominal pain and intoxication. Patient reports severe diffuse abd pain duration x several days with associated nausea, no vomiting. Denies chest pain or sob or fevers. He reports he has not been drinking etoh x 1 month. He denies any fevers.

## 2023-05-09 NOTE — ED ADULT NURSE NOTE - NSFALLRSKASSESASSIST_ED_ALL_ED
Fasting blood sugars 300-400 range!, lunch blood sugars  with 3 low readings in 50s, supper blood sugars generally less than 150 with rare low blood sugar reading    -please review if eating snack at bedtime and also advised to check fingerstick at bedtime.   Would also recommend checking lilliana antibodies with next blood draw and can be reviewed with Dr. Jose French    -Try  Taking     Lantus twice a day 10 units bid or 20 units nightly)    Humalog or Novolog       Blood Sugar Breakfast Lunch Dinner Bedtime( snack)   <100 2 2 2 0   101-150 4 6 6 3   151-200 5 7 7 4   >200 6 8 8 5 no

## 2023-05-09 NOTE — ED ADULT NURSE REASSESSMENT NOTE - NS ED NURSE REASSESS COMMENT FT1
received pt @ 8359 from PRETTY Hough pt reports pain MD made aware . 1:1 maintained will continue to monitor.

## 2023-05-09 NOTE — ED PROVIDER NOTE - NS ED MD DISPO ADMITTING SERVICE
----- Message from Sri Urban MD sent at 9/12/2022 12:56 PM CDT -----  Cbc stable. Let pt know. Rest of labs are pending   MEDICINE

## 2023-05-09 NOTE — ED PROVIDER NOTE - PROGRESS NOTE DETAILS
Patient care endorsed last night by Dr. Black, patient with etoh and abdominal pain.  Well known to ER for multiple prior visits for the same.  Patient keeps requesting pain medication, screaming about his usual abdominal pain. Has multiple CT imaging confirming gastritis, states same pain as before.  CIWA 5.  Patient is to be admitted to the hospital and the case was discussed with the admitting physician.  Any changes in plan, additional imaging/labs, and further work up will be at the discretion of the admitting physician. Per discussion with admitting physician, all necessary consults for admitted patients to be obtained by morning team unless patient requires emergent intervention or medical advice by specialist overnight. Patient care endorsed last night by Dr. Black, patient with etoh and abdominal pain.  Well known to ER for multiple prior visits for the same.  Patient keeps requesting pain medication, screaming about his usual abdominal pain. Also always says that he wants to kill himself when he comes in, now more sober and retracting statement, do not feel patient needs emergent psych consult for suicidality as patient has been saying this for years.  Has multiple CT imaging confirming gastritis, states same pain as before.  CIWA 5.  Patient is to be admitted to the hospital and the case was discussed with the admitting physician.  Any changes in plan, additional imaging/labs, and further work up will be at the discretion of the admitting physician. Per discussion with admitting physician, all necessary consults for admitted patients to be obtained by morning team unless patient requires emergent intervention or medical advice by specialist overnight. Patient care endorsed last night by Dr. Black, patient with etoh and abdominal pain.  Well known to ER for multiple prior visits for the same.  Patient keeps requesting pain medication, screaming about his usual abdominal pain. Also always says that he wants to kill himself when he comes in, now more sober and retracting statement, do not feel patient needs emergent psych consult for suicidality as patient has been saying this for years.  Has multiple CT imaging confirming gastritis, states same pain as before.  CIWA 5.  Patient is to be admitted to the hospital for intractable pain and the case was discussed with the admitting physician.  Any changes in plan, additional imaging/labs, and further work up will be at the discretion of the admitting physician. Per discussion with admitting physician, all necessary consults for admitted patients to be obtained by morning team unless patient requires emergent intervention or medical advice by specialist overnight.

## 2023-05-09 NOTE — ED PROVIDER NOTE - CARE PLAN
1 Principal Discharge DX:	Alcohol intoxication, uncomplicated  Secondary Diagnosis:	Gastritis  Secondary Diagnosis:	Intractable abdominal pain

## 2023-05-09 NOTE — ED PROVIDER NOTE - PHYSICAL EXAMINATION
Gen: AOX3, NAD   Head: NCAT  ENT: Airway patent, moist mucous membranes, nasal passageways clear, no pharyngeal erythema   Cardiac: Normal rate, normal rhythm, no murmurs   Respiratory: Lungs CTA B/L  Gastrointestinal: Abdomen soft, mild epigastric ttp, no rebound/ guarding   MSK: No gross abnormalities, FROM of all four extremities, no edema  HEME: Extremities warm, pulses intact and symmetrical in all four extremities  Skin: No rashes, no lesions  Neuro: No gross neurologic deficits, CN II-XII intact, no facial asymmetry, no tremors, no tongue fasciculations

## 2023-05-09 NOTE — ED ADULT TRIAGE NOTE - CHIEF COMPLAINT QUOTE
patient c/o of abdominal pain, n/v denied chest pain , last drink 1 month ago, patient also stated " I want to kill myself I don't want to kill nobody but me " patient place under 1:1 observation

## 2023-05-09 NOTE — ED ADULT NURSE NOTE - NSFALLUNIVINTERV_ED_ALL_ED
Bed/Stretcher in lowest position, wheels locked, appropriate side rails in place/Call bell, personal items and telephone in reach/Instruct patient to call for assistance before getting out of bed/chair/stretcher/Non-slip footwear applied when patient is off stretcher/[place] to call system/Physically safe environment - no spills, clutter or unnecessary equipment/Purposeful proactive rounding/Room/bathroom lighting operational, light cord in reach

## 2023-05-10 LAB
ALBUMIN SERPL ELPH-MCNC: 4.1 G/DL — SIGNIFICANT CHANGE UP (ref 3.3–5)
ALP SERPL-CCNC: 61 U/L — SIGNIFICANT CHANGE UP (ref 40–120)
ALT FLD-CCNC: 27 U/L — SIGNIFICANT CHANGE UP (ref 12–78)
ANION GAP SERPL CALC-SCNC: 5 MMOL/L — SIGNIFICANT CHANGE UP (ref 5–17)
AST SERPL-CCNC: 30 U/L — SIGNIFICANT CHANGE UP (ref 15–37)
BILIRUB DIRECT SERPL-MCNC: 0.2 MG/DL — SIGNIFICANT CHANGE UP (ref 0–0.3)
BILIRUB INDIRECT FLD-MCNC: 0.6 MG/DL — SIGNIFICANT CHANGE UP (ref 0.2–1)
BILIRUB SERPL-MCNC: 0.8 MG/DL — SIGNIFICANT CHANGE UP (ref 0.2–1.2)
BUN SERPL-MCNC: 16 MG/DL — SIGNIFICANT CHANGE UP (ref 7–23)
CALCIUM SERPL-MCNC: 9.3 MG/DL — SIGNIFICANT CHANGE UP (ref 8.5–10.1)
CHLORIDE SERPL-SCNC: 111 MMOL/L — HIGH (ref 96–108)
CO2 SERPL-SCNC: 25 MMOL/L — SIGNIFICANT CHANGE UP (ref 22–31)
CREAT SERPL-MCNC: 1.38 MG/DL — HIGH (ref 0.5–1.3)
EGFR: 60 ML/MIN/1.73M2 — SIGNIFICANT CHANGE UP
GLUCOSE SERPL-MCNC: 124 MG/DL — HIGH (ref 70–99)
MAGNESIUM SERPL-MCNC: 1.8 MG/DL — SIGNIFICANT CHANGE UP (ref 1.6–2.6)
PHOSPHATE SERPL-MCNC: 2.3 MG/DL — LOW (ref 2.5–4.5)
POTASSIUM SERPL-MCNC: 3.6 MMOL/L — SIGNIFICANT CHANGE UP (ref 3.5–5.3)
POTASSIUM SERPL-SCNC: 3.6 MMOL/L — SIGNIFICANT CHANGE UP (ref 3.5–5.3)
PROT SERPL-MCNC: 8.6 GM/DL — HIGH (ref 6–8.3)
SODIUM SERPL-SCNC: 141 MMOL/L — SIGNIFICANT CHANGE UP (ref 135–145)

## 2023-05-10 PROCEDURE — 99223 1ST HOSP IP/OBS HIGH 75: CPT

## 2023-05-10 PROCEDURE — 36569 INSJ PICC 5 YR+ W/O IMAGING: CPT

## 2023-05-10 PROCEDURE — 76937 US GUIDE VASCULAR ACCESS: CPT | Mod: 26,59

## 2023-05-10 RX ORDER — SUCRALFATE 1 G
1 TABLET ORAL
Refills: 0 | Status: DISCONTINUED | OUTPATIENT
Start: 2023-05-10 | End: 2023-05-11

## 2023-05-10 RX ORDER — ACETAMINOPHEN 500 MG
1000 TABLET ORAL ONCE
Refills: 0 | Status: COMPLETED | OUTPATIENT
Start: 2023-05-10 | End: 2023-05-10

## 2023-05-10 RX ORDER — ENOXAPARIN SODIUM 100 MG/ML
40 INJECTION SUBCUTANEOUS EVERY 24 HOURS
Refills: 0 | Status: DISCONTINUED | OUTPATIENT
Start: 2023-05-10 | End: 2023-05-11

## 2023-05-10 RX ORDER — FOLIC ACID 0.8 MG
1 TABLET ORAL DAILY
Refills: 0 | Status: DISCONTINUED | OUTPATIENT
Start: 2023-05-10 | End: 2023-05-11

## 2023-05-10 RX ORDER — IOHEXOL 300 MG/ML
30 INJECTION, SOLUTION INTRAVENOUS ONCE
Refills: 0 | Status: COMPLETED | OUTPATIENT
Start: 2023-05-10 | End: 2023-05-10

## 2023-05-10 RX ORDER — ACETAMINOPHEN 500 MG
650 TABLET ORAL EVERY 6 HOURS
Refills: 0 | Status: DISCONTINUED | OUTPATIENT
Start: 2023-05-10 | End: 2023-05-11

## 2023-05-10 RX ORDER — PANTOPRAZOLE SODIUM 20 MG/1
40 TABLET, DELAYED RELEASE ORAL
Refills: 0 | Status: DISCONTINUED | OUTPATIENT
Start: 2023-05-10 | End: 2023-05-11

## 2023-05-10 RX ORDER — MORPHINE SULFATE 50 MG/1
4 CAPSULE, EXTENDED RELEASE ORAL ONCE
Refills: 0 | Status: DISCONTINUED | OUTPATIENT
Start: 2023-05-10 | End: 2023-05-10

## 2023-05-10 RX ORDER — THIAMINE MONONITRATE (VIT B1) 100 MG
100 TABLET ORAL DAILY
Refills: 0 | Status: DISCONTINUED | OUTPATIENT
Start: 2023-05-10 | End: 2023-05-11

## 2023-05-10 RX ORDER — MORPHINE SULFATE 50 MG/1
1 CAPSULE, EXTENDED RELEASE ORAL EVERY 6 HOURS
Refills: 0 | Status: DISCONTINUED | OUTPATIENT
Start: 2023-05-10 | End: 2023-05-11

## 2023-05-10 RX ORDER — PANTOPRAZOLE SODIUM 20 MG/1
40 TABLET, DELAYED RELEASE ORAL ONCE
Refills: 0 | Status: COMPLETED | OUTPATIENT
Start: 2023-05-10 | End: 2023-05-10

## 2023-05-10 RX ORDER — ONDANSETRON 8 MG/1
4 TABLET, FILM COATED ORAL EVERY 8 HOURS
Refills: 0 | Status: DISCONTINUED | OUTPATIENT
Start: 2023-05-10 | End: 2023-05-11

## 2023-05-10 RX ORDER — SODIUM CHLORIDE 9 MG/ML
1000 INJECTION INTRAMUSCULAR; INTRAVENOUS; SUBCUTANEOUS
Refills: 0 | Status: DISCONTINUED | OUTPATIENT
Start: 2023-05-10 | End: 2023-05-11

## 2023-05-10 RX ORDER — OXYCODONE AND ACETAMINOPHEN 5; 325 MG/1; MG/1
1 TABLET ORAL EVERY 6 HOURS
Refills: 0 | Status: DISCONTINUED | OUTPATIENT
Start: 2023-05-10 | End: 2023-05-11

## 2023-05-10 RX ADMIN — MORPHINE SULFATE 4 MILLIGRAM(S): 50 CAPSULE, EXTENDED RELEASE ORAL at 02:43

## 2023-05-10 RX ADMIN — ONDANSETRON 4 MILLIGRAM(S): 8 TABLET, FILM COATED ORAL at 12:49

## 2023-05-10 RX ADMIN — MORPHINE SULFATE 1 MILLIGRAM(S): 50 CAPSULE, EXTENDED RELEASE ORAL at 11:22

## 2023-05-10 RX ADMIN — Medication 400 MILLIGRAM(S): at 05:48

## 2023-05-10 RX ADMIN — SODIUM CHLORIDE 75 MILLILITER(S): 9 INJECTION INTRAMUSCULAR; INTRAVENOUS; SUBCUTANEOUS at 23:46

## 2023-05-10 RX ADMIN — Medication 650 MILLIGRAM(S): at 17:49

## 2023-05-10 RX ADMIN — Medication 1 MILLIGRAM(S): at 05:46

## 2023-05-10 RX ADMIN — Medication 650 MILLIGRAM(S): at 11:20

## 2023-05-10 RX ADMIN — ENOXAPARIN SODIUM 40 MILLIGRAM(S): 100 INJECTION SUBCUTANEOUS at 12:54

## 2023-05-10 RX ADMIN — Medication 1 MILLIGRAM(S): at 13:12

## 2023-05-10 RX ADMIN — MORPHINE SULFATE 4 MILLIGRAM(S): 50 CAPSULE, EXTENDED RELEASE ORAL at 00:26

## 2023-05-10 RX ADMIN — Medication 2 MILLIGRAM(S): at 10:29

## 2023-05-10 RX ADMIN — MORPHINE SULFATE 1 MILLIGRAM(S): 50 CAPSULE, EXTENDED RELEASE ORAL at 17:48

## 2023-05-10 RX ADMIN — Medication 100 MILLIGRAM(S): at 13:11

## 2023-05-10 RX ADMIN — Medication 1 GRAM(S): at 18:55

## 2023-05-10 RX ADMIN — IOHEXOL 30 MILLILITER(S): 300 INJECTION, SOLUTION INTRAVENOUS at 12:49

## 2023-05-10 RX ADMIN — Medication 1 TABLET(S): at 13:12

## 2023-05-10 RX ADMIN — MORPHINE SULFATE 1 MILLIGRAM(S): 50 CAPSULE, EXTENDED RELEASE ORAL at 10:30

## 2023-05-10 RX ADMIN — Medication 650 MILLIGRAM(S): at 10:30

## 2023-05-10 RX ADMIN — Medication 1 GRAM(S): at 23:46

## 2023-05-10 RX ADMIN — PANTOPRAZOLE SODIUM 40 MILLIGRAM(S): 20 TABLET, DELAYED RELEASE ORAL at 02:44

## 2023-05-10 NOTE — PATIENT PROFILE ADULT - FALL HARM RISK - HARM RISK INTERVENTIONS
Assistance with ambulation/Assistance OOB with selected safe patient handling equipment/Communicate Risk of Fall with Harm to all staff/Monitor for mental status changes/Monitor gait and stability/Reinforce activity limits and safety measures with patient and family/Tailored Fall Risk Interventions/Toileting schedule using arm’s reach rule for commode and bathroom/Use of alarms - bed, chair and/or voice tab/Visual Cue: Yellow wristband and red socks/Bed in lowest position, wheels locked, appropriate side rails in place/Call bell, personal items and telephone in reach/Instruct patient to call for assistance before getting out of bed or chair/Non-slip footwear when patient is out of bed/[place] to call system/Physically safe environment - no spills, clutter or unnecessary equipment/Purposeful Proactive Rounding/Room/bathroom lighting operational, light cord in reach

## 2023-05-10 NOTE — ED ADULT NURSE REASSESSMENT NOTE - NS ED NURSE REASSESS COMMENT FT1
Received report from RN on previous shift. Patient is A&Ox3, breathing even and unlabored breaths. Patient on 1:1 constant obs, PCA at bedside. No acute distress noted or verbalized.

## 2023-05-10 NOTE — H&P ADULT - NSHPLABSRESULTS_GEN_ALL_CORE
13.4   4.98  )-----------( 328      ( 09 May 2023 19:54 )             42.6     05-10    141  |  111<H>  |  16  ----------------------------<  124<H>  3.6   |  25  |  1.38<H>    Ca    9.3      10 May 2023 12:15  Phos  2.3     05-10  Mg     1.8     05-10    TPro  8.6<H>  /  Alb  4.1  /  TBili  0.8  /  DBili  0.2  /  AST  30  /  ALT  27  /  AlkPhos  61  05-10      Urinalysis Basic - ( 09 May 2023 19:54 )    Color: Yellow / Appearance: Clear / S.010 / pH: x  Gluc: x / Ketone: Negative  / Bili: Negative / Urobili: Negative mg/dL   Blood: x / Protein: 30 mg/dL / Nitrite: Negative   Leuk Esterase: Negative / RBC: Negative /HPF / WBC 0-2   Sq Epi: x / Non Sq Epi: x / Bacteria: Few

## 2023-05-10 NOTE — H&P ADULT - ASSESSMENT
58M with complex care note in chart - chronic hx of etoh abuse and multiple admissions to the ED/ hospital with complaints of abdominal pain and intoxication. Patient reports severe diffuse abd pain duration x several days with associated nausea, no vomiting. Denies chest pain or sob or fevers. He reports he has not been drinking etoh x 1 month. He denies any fevers.      Abdominal Pain:  - Likely due to alcoholic gastritis, previous h/o PUD.    - Resume PPI, Sucralfate.    - Follow up  CT A/P   - NPO , advance as tolerated       Alcohol Abuse:  - Alcohol level 264, possibly withdrawing as his alcohol level been much higher in the past   - Monitor for DT  - Ativan taper per CIWA  - Start multivitamin folate, thiamine      RUE swelling:  - As per pt after IV possibly contrast ? infiltration  - Doppler ordered   - Discussed with RN and PA to monitor closely for compartment syndrome      DVT Prophylaxis - OOB

## 2023-05-10 NOTE — H&P ADULT - NSHPPHYSICALEXAM_GEN_ALL_CORE
GENERAL: NAD, well-groomed, well-developed  HEAD:  Atraumatic, Normocephalic  EYES: EOMI, PERRLA, conjunctiva and sclera clear  ENMT: No tonsillar erythema, exudates, or enlargement; Moist mucous membranes, Good dentition, No lesions  NECK: Supple, No JVD, Normal thyroid  NERVOUS SYSTEM:  Alert & Oriented X3, Good concentration; Motor Strength 5/5 B/L upper and lower extremities; DTRs 2+ intact and symmetric  CHEST/LUNG: Clear to percussion bilaterally; No rales, rhonchi, wheezing, or rubs  HEART: Regular rate and rhythm; No murmurs, rubs, or gallops  ABDOMEN: Soft, + generalized tenderness, Nondistended; Bowel sounds present  EXTREMITIES:  2+ Peripheral Pulses, RUE swelling after IV infiltration,  LYMPH: No lymphadenopathy noted  SKIN: No rashes or lesions

## 2023-05-10 NOTE — PATIENT PROFILE ADULT - FUNCTIONAL ASSESSMENT - BASIC MOBILITY 6.
4-calculated by average/Not able to assess (calculate score using [facility] averaging method)

## 2023-05-11 ENCOUNTER — TRANSCRIPTION ENCOUNTER (OUTPATIENT)
Age: 58
End: 2023-05-11

## 2023-05-11 VITALS
DIASTOLIC BLOOD PRESSURE: 89 MMHG | TEMPERATURE: 98 F | HEART RATE: 93 BPM | RESPIRATION RATE: 18 BRPM | OXYGEN SATURATION: 99 % | SYSTOLIC BLOOD PRESSURE: 144 MMHG

## 2023-05-11 LAB
ANION GAP SERPL CALC-SCNC: 8 MMOL/L — SIGNIFICANT CHANGE UP (ref 5–17)
BUN SERPL-MCNC: 14 MG/DL — SIGNIFICANT CHANGE UP (ref 7–23)
CALCIUM SERPL-MCNC: 9.1 MG/DL — SIGNIFICANT CHANGE UP (ref 8.5–10.1)
CHLORIDE SERPL-SCNC: 106 MMOL/L — SIGNIFICANT CHANGE UP (ref 96–108)
CO2 SERPL-SCNC: 22 MMOL/L — SIGNIFICANT CHANGE UP (ref 22–31)
CREAT SERPL-MCNC: 1.3 MG/DL — SIGNIFICANT CHANGE UP (ref 0.5–1.3)
EGFR: 64 ML/MIN/1.73M2 — SIGNIFICANT CHANGE UP
GLUCOSE SERPL-MCNC: 123 MG/DL — HIGH (ref 70–99)
MAGNESIUM SERPL-MCNC: 1.9 MG/DL — SIGNIFICANT CHANGE UP (ref 1.6–2.6)
PHOSPHATE SERPL-MCNC: 3 MG/DL — SIGNIFICANT CHANGE UP (ref 2.5–4.5)
POTASSIUM SERPL-MCNC: 3.1 MMOL/L — LOW (ref 3.5–5.3)
POTASSIUM SERPL-SCNC: 3.1 MMOL/L — LOW (ref 3.5–5.3)
SODIUM SERPL-SCNC: 136 MMOL/L — SIGNIFICANT CHANGE UP (ref 135–145)

## 2023-05-11 PROCEDURE — 93971 EXTREMITY STUDY: CPT | Mod: 26,RT

## 2023-05-11 PROCEDURE — 99239 HOSP IP/OBS DSCHRG MGMT >30: CPT

## 2023-05-11 PROCEDURE — 76700 US EXAM ABDOM COMPLETE: CPT | Mod: 26

## 2023-05-11 RX ORDER — PANTOPRAZOLE SODIUM 20 MG/1
1 TABLET, DELAYED RELEASE ORAL
Qty: 30 | Refills: 0
Start: 2023-05-11 | End: 2023-06-09

## 2023-05-11 RX ORDER — MORPHINE SULFATE 50 MG/1
1 CAPSULE, EXTENDED RELEASE ORAL ONCE
Refills: 0 | Status: COMPLETED | OUTPATIENT
Start: 2023-05-11 | End: 2023-05-11

## 2023-05-11 RX ORDER — DIATRIZOATE MEGLUMINE 180 MG/ML
30 INJECTION, SOLUTION INTRAVESICAL ONCE
Refills: 0 | Status: DISCONTINUED | OUTPATIENT
Start: 2023-05-11 | End: 2023-05-11

## 2023-05-11 RX ORDER — POTASSIUM CHLORIDE 20 MEQ
40 PACKET (EA) ORAL ONCE
Refills: 0 | Status: COMPLETED | OUTPATIENT
Start: 2023-05-11 | End: 2023-05-11

## 2023-05-11 RX ADMIN — MORPHINE SULFATE 1 MILLIGRAM(S): 50 CAPSULE, EXTENDED RELEASE ORAL at 12:14

## 2023-05-11 RX ADMIN — PANTOPRAZOLE SODIUM 40 MILLIGRAM(S): 20 TABLET, DELAYED RELEASE ORAL at 06:01

## 2023-05-11 RX ADMIN — Medication 40 MILLIEQUIVALENT(S): at 12:03

## 2023-05-11 RX ADMIN — Medication 1 GRAM(S): at 06:01

## 2023-05-11 RX ADMIN — Medication 1 TABLET(S): at 12:03

## 2023-05-11 RX ADMIN — Medication 1 MILLIGRAM(S): at 12:03

## 2023-05-11 RX ADMIN — Medication 650 MILLIGRAM(S): at 12:03

## 2023-05-11 RX ADMIN — ENOXAPARIN SODIUM 40 MILLIGRAM(S): 100 INJECTION SUBCUTANEOUS at 12:03

## 2023-05-11 NOTE — DISCHARGE NOTE NURSING/CASE MANAGEMENT/SOCIAL WORK - NSDCPEFALRISK_GEN_ALL_CORE
For information on Fall & Injury Prevention, visit: [email or web address] OR  [email or web address] OR  https://www.cdc.gov/steadi/patient.html

## 2023-05-11 NOTE — DISCHARGE NOTE PROVIDER - NSDCCPCAREPLAN_GEN_ALL_CORE_FT
PRINCIPAL DISCHARGE DIAGNOSIS  Diagnosis: Alcohol intoxication, uncomplicated  Assessment and Plan of Treatment: 58M with complex care note in chart - chronic hx of etoh abuse and multiple admissions to the ED/ hospital with complaints of abdominal pain and intoxication. Patient reports severe diffuse abd pain duration x several days with associated nausea, no vomiting. Denies chest pain or sob or fevers. He reports he has not been drinking etoh x 1 month. He denies any fevers.  Abdominal Pain:  - Likely due to alcoholic gastritis, previous h/o PUD.    - Resume PPI, Sucralfate.    - Pain resolved, will hold off CT   - Advanced diet, tolerating   Alcohol Abuse:  - Alcohol level 264  - Monitor for DT  - Multivitamin folate, thiamine  - no sign of withdrawal now  RUE swelling:  - As per pt after IV infiltration  - Doppler neg  - Improved         SECONDARY DISCHARGE DIAGNOSES  Diagnosis: Gastritis  Assessment and Plan of Treatment:     Diagnosis: Intractable abdominal pain  Assessment and Plan of Treatment:

## 2023-05-11 NOTE — DISCHARGE NOTE NURSING/CASE MANAGEMENT/SOCIAL WORK - NSDCVIVACCINE_GEN_ALL_CORE_FT
COVID-19, mRNA, LNP-S, PF, 100 mcg/ 0.5 mL dose (Moderna); 10-Jovan-2021 15:38; Reji Hernandez (RN); [employer] [id number] (Exp. Date: 13-Jul-2021); IntraMuscular; Deltoid Right.; 0.5 milliLiter(s);   influenza, injectable, quadrivalent, preservative free; 31-Jan-2019 15:53; Ayaka Bynum (RN); [employer]; [id number] (Exp. Date: 30-Jun-2019); IntraMuscular; Deltoid Right.; 0.5 milliLiter(s); VIS (VIS Published: 07-Aug-2015, VIS Presented: 31-Jan-2019);   influenza, injectable, quadrivalent, preservative free; 10-Nov-2019 14:13; Laverne Lane (RN); [employer]; [id number] (Exp. Date: 30-Jun-2020); IntraMuscular; Deltoid Left.; 0.5 milliLiter(s); VIS (VIS Published: 15-Aug-2019, VIS Presented: 10-Nov-2019);   influenza, injectable, quadrivalent, preservative free; 13-Oct-2020 11:56; Kimberli Cronin (RN); [employer]; [id number] (Exp. Date: 30-Jun-2021); IntraMuscular; Deltoid Right.; 0.5 milliLiter(s); VIS (VIS Published: 15-Aug-2019, VIS Presented: 13-Oct-2020);   Td (adult) preservative free; 18-Jan-2020 20:04; Yulia Vance (RN); [employer]; [id number] (Exp. Date: 19-Jan-2021); IntraMuscular; Deltoid Right.; 0.5 milliLiter(s); VIS (VIS Published: 18-Jan-2020, VIS Presented: 18-Jan-2020);

## 2023-05-11 NOTE — DISCHARGE NOTE NURSING/CASE MANAGEMENT/SOCIAL WORK - PATIENT PORTAL LINK FT
You can access the FollowMyHealth Patient Portal offered by [facility] by registering at the following website: [email or web address]. By joining [facility]’s FollowMyHealth portal, you will also be able to view your health information using other applications (apps) compatible with our system.

## 2023-05-11 NOTE — DISCHARGE NOTE PROVIDER - NSDCMRMEDTOKEN_GEN_ALL_CORE_FT
Multiple Vitamins oral tablet: 1 tab(s) orally once a day  pantoprazole 40 mg oral delayed release tablet: 1 tab(s) orally once a day (before a meal)  thiamine 100 mg oral tablet: 1 tab(s) orally once a day

## 2023-05-11 NOTE — DISCHARGE NOTE PROVIDER - ATTENDING DISCHARGE PHYSICAL EXAMINATION:
Vital Signs Last 24 Hrs  T(C): 36.4 (11 May 2023 11:00), Max: 36.7 (10 May 2023 15:26)  T(F): 97.5 (11 May 2023 11:00), Max: 98.1 (10 May 2023 15:26)  HR: 93 (11 May 2023 11:00) (90 - 97)  BP: 144/89 (11 May 2023 11:00) (104/67 - 157/94)  BP(mean): --  RR: 18 (11 May 2023 11:00) (18 - 18)  SpO2: 99% (11 May 2023 11:00) (95% - 99%)    Parameters below as of 11 May 2023 11:00  Patient On (Oxygen Delivery Method): room air    GENERAL: NAD, well-groomed, well-developed  HEAD:  Atraumatic, Normocephalic  EYES: EOMI, PERRLA, conjunctiva and sclera clear  ENMT: No tonsillar erythema, exudates, or enlargement; Moist mucous membranes, Good dentition, No lesions  NECK: Supple, No JVD, Normal thyroid  NERVOUS SYSTEM:  Alert & Oriented X3, Good concentration; Motor Strength 5/5 B/L upper and lower extremities; DTRs 2+ intact and symmetric  CHEST/LUNG: Clear to percussion bilaterally; No rales, rhonchi, wheezing, or rubs  HEART: Regular rate and rhythm; No murmurs, rubs, or gallops  ABDOMEN: Soft, Nontender, Nondistended; Bowel sounds present  EXTREMITIES:  2+ Peripheral Pulses, No clubbing, cyanosis, or edema  LYMPH: No lymphadenopathy noted  SKIN: No rashes or lesions

## 2023-05-11 NOTE — DISCHARGE NOTE PROVIDER - HOSPITAL COURSE
58M with complex care note in chart - chronic hx of etoh abuse and multiple admissions to the ED/ hospital with complaints of abdominal pain and intoxication. Patient reports severe diffuse abd pain duration x several days with associated nausea, no vomiting. Denies chest pain or sob or fevers. He reports he has not been drinking etoh x 1 month. He denies any fevers.      Abdominal Pain:  - Likely due to alcoholic gastritis, previous h/o PUD.    - Resume PPI, Sucralfate.    - Pain resolved, will hold off CT   - Advanced diet, tolerating       Alcohol Abuse:  - Alcohol level 264  - Monitor for DT  - Multivitamin folate, thiamine  - no sign of withdrawal now      RUE swelling:  - As per pt after IV infiltration  - Doppler neg  - Improved

## 2023-05-15 DIAGNOSIS — R10.9 UNSPECIFIED ABDOMINAL PAIN: ICD-10-CM

## 2023-05-15 DIAGNOSIS — F10.229 ALCOHOL DEPENDENCE WITH INTOXICATION, UNSPECIFIED: ICD-10-CM

## 2023-05-15 DIAGNOSIS — F10.239 ALCOHOL DEPENDENCE WITH WITHDRAWAL, UNSPECIFIED: ICD-10-CM

## 2023-05-15 DIAGNOSIS — K29.20 ALCOHOLIC GASTRITIS WITHOUT BLEEDING: ICD-10-CM

## 2023-05-15 DIAGNOSIS — M79.89 OTHER SPECIFIED SOFT TISSUE DISORDERS: ICD-10-CM

## 2023-05-15 DIAGNOSIS — Z87.19 PERSONAL HISTORY OF OTHER DISEASES OF THE DIGESTIVE SYSTEM: ICD-10-CM

## 2023-05-15 DIAGNOSIS — Z87.898 PERSONAL HISTORY OF OTHER SPECIFIED CONDITIONS: ICD-10-CM

## 2023-05-15 DIAGNOSIS — E78.2 MIXED HYPERLIPIDEMIA: ICD-10-CM
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
## 2023-05-15 DIAGNOSIS — Z20.822 CONTACT WITH AND (SUSPECTED) EXPOSURE TO COVID-19: ICD-10-CM

## 2023-05-15 DIAGNOSIS — Z87.11 PERSONAL HISTORY OF PEPTIC ULCER DISEASE: ICD-10-CM

## 2023-05-18 NOTE — H&P ADULT - NSHPRISKHEPCSCREEN_GEN_A_CORE
What Type Of Note Output Would You Prefer (Optional)?: Standard Output How Severe Are Your Spot(S)?: mild Hpi Title: Evaluation of Skin Lesions Unable to offer due to clinical condition

## 2023-05-23 NOTE — PROCEDURE NOTE - NSPERFORMEDBY_GEN_A_CORE
Patient complains of chest pain and shortness of breath that she awoke with a couple hours prior to arrival. Patient reports dizziness that started before she went to bed last night. Denies nausea.        Triage Assessment     Row Name 05/23/23 0258       Triage Assessment (Adult)    Airway WDL WDL       Respiratory WDL    Respiratory WDL WDL       Cardiac WDL    Cardiac WDL X;chest pain       Peripheral/Neurovascular WDL    Peripheral Neurovascular WDL WDL       Cognitive/Neuro/Behavioral WDL    Cognitive/Neuro/Behavioral WDL WDL               Myself

## 2023-06-13 NOTE — PRE-OP CHECKLIST - BP NONINVASIVE DIASTOLIC (MM HG)
80 Rituxan Counseling:  I discussed with the patient the risks of Rituxan infusions. Side effects can include infusion reactions, severe drug rashes including mucocutaneous reactions, reactivation of latent hepatitis and other infections and rarely progressive multifocal leukoencephalopathy.  All of the patient's questions and concerns were addressed.

## 2023-06-15 NOTE — DISCHARGE NOTE ADULT - PATIENT PORTAL LINK FT
“You can access the FollowHealth Patient Portal, offered by [facility], by registering with the following website: [email or web address]” denies Imaging Studies/Labs

## 2023-06-26 NOTE — PROGRESS NOTE ADULT - PROBLEM SELECTOR PLAN 4
cont statin
right leg swelling

## 2023-07-05 ENCOUNTER — EMERGENCY (EMERGENCY)
Facility: HOSPITAL | Age: 58
LOS: 0 days | Discharge: HOME | End: 2023-07-06
Attending: STUDENT IN AN ORGANIZED HEALTH CARE EDUCATION/TRAINING PROGRAM
Payer: MEDICAID

## 2023-07-05 VITALS
DIASTOLIC BLOOD PRESSURE: 88 MMHG | SYSTOLIC BLOOD PRESSURE: 130 MMHG | OXYGEN SATURATION: 96 % | WEIGHT: 184.97 LBS | TEMPERATURE: 98 F | HEIGHT: 67 IN | HEART RATE: 87 BPM | RESPIRATION RATE: 19 BRPM

## 2023-07-05 DIAGNOSIS — R10.84 GENERALIZED ABDOMINAL PAIN: ICD-10-CM

## 2023-07-05 DIAGNOSIS — R11.10 VOMITING, UNSPECIFIED: ICD-10-CM

## 2023-07-05 DIAGNOSIS — R45.851 SUICIDAL IDEATIONS: ICD-10-CM

## 2023-07-05 DIAGNOSIS — F10.129 ALCOHOL ABUSE WITH INTOXICATION, UNSPECIFIED: ICD-10-CM

## 2023-07-05 DIAGNOSIS — R63.0 ANOREXIA: ICD-10-CM

## 2023-07-05 DIAGNOSIS — R19.7 DIARRHEA, UNSPECIFIED: ICD-10-CM

## 2023-07-05 LAB
ALBUMIN SERPL ELPH-MCNC: 3.9 G/DL — SIGNIFICANT CHANGE UP (ref 3.3–5)
ALP SERPL-CCNC: 63 U/L — SIGNIFICANT CHANGE UP (ref 40–120)
ALT FLD-CCNC: 32 U/L — SIGNIFICANT CHANGE UP (ref 12–78)
AMPHET UR-MCNC: NEGATIVE — SIGNIFICANT CHANGE UP
ANION GAP SERPL CALC-SCNC: 7 MMOL/L — SIGNIFICANT CHANGE UP (ref 5–17)
APAP SERPL-MCNC: 19 UG/ML — SIGNIFICANT CHANGE UP (ref 10–30)
APPEARANCE UR: CLEAR — SIGNIFICANT CHANGE UP
AST SERPL-CCNC: 32 U/L — SIGNIFICANT CHANGE UP (ref 15–37)
BACTERIA # UR AUTO: ABNORMAL
BARBITURATES UR SCN-MCNC: NEGATIVE — SIGNIFICANT CHANGE UP
BASOPHILS # BLD AUTO: 0.04 K/UL — SIGNIFICANT CHANGE UP (ref 0–0.2)
BASOPHILS NFR BLD AUTO: 1.1 % — SIGNIFICANT CHANGE UP (ref 0–2)
BENZODIAZ UR-MCNC: NEGATIVE — SIGNIFICANT CHANGE UP
BILIRUB SERPL-MCNC: 0.6 MG/DL — SIGNIFICANT CHANGE UP (ref 0.2–1.2)
BILIRUB UR-MCNC: NEGATIVE — SIGNIFICANT CHANGE UP
BUN SERPL-MCNC: 13 MG/DL — SIGNIFICANT CHANGE UP (ref 7–23)
CALCIUM SERPL-MCNC: 9.4 MG/DL — SIGNIFICANT CHANGE UP (ref 8.5–10.1)
CHLORIDE SERPL-SCNC: 110 MMOL/L — HIGH (ref 96–108)
CO2 SERPL-SCNC: 23 MMOL/L — SIGNIFICANT CHANGE UP (ref 22–31)
COCAINE METAB.OTHER UR-MCNC: NEGATIVE — SIGNIFICANT CHANGE UP
COLOR SPEC: YELLOW — SIGNIFICANT CHANGE UP
CREAT SERPL-MCNC: 1.12 MG/DL — SIGNIFICANT CHANGE UP (ref 0.5–1.3)
DIFF PNL FLD: ABNORMAL
EGFR: 77 ML/MIN/1.73M2 — SIGNIFICANT CHANGE UP
EOSINOPHIL # BLD AUTO: 0.05 K/UL — SIGNIFICANT CHANGE UP (ref 0–0.5)
EOSINOPHIL NFR BLD AUTO: 1.4 % — SIGNIFICANT CHANGE UP (ref 0–6)
EPI CELLS # UR: SIGNIFICANT CHANGE UP
ETHANOL SERPL-MCNC: 318 MG/DL — HIGH (ref 0–10)
GLUCOSE SERPL-MCNC: 113 MG/DL — HIGH (ref 70–99)
GLUCOSE UR QL: 250 MG/DL
HCT VFR BLD CALC: 41.5 % — SIGNIFICANT CHANGE UP (ref 39–50)
HGB BLD-MCNC: 12.8 G/DL — LOW (ref 13–17)
IMM GRANULOCYTES NFR BLD AUTO: 0.3 % — SIGNIFICANT CHANGE UP (ref 0–0.9)
KETONES UR-MCNC: NEGATIVE — SIGNIFICANT CHANGE UP
LACTATE SERPL-SCNC: 3.5 MMOL/L — HIGH (ref 0.7–2)
LACTATE SERPL-SCNC: 3.5 MMOL/L — HIGH (ref 0.7–2)
LEUKOCYTE ESTERASE UR-ACNC: NEGATIVE — SIGNIFICANT CHANGE UP
LIDOCAIN IGE QN: 252 U/L — SIGNIFICANT CHANGE UP (ref 73–393)
LYMPHOCYTES # BLD AUTO: 1.84 K/UL — SIGNIFICANT CHANGE UP (ref 1–3.3)
LYMPHOCYTES # BLD AUTO: 51.1 % — HIGH (ref 13–44)
MAGNESIUM SERPL-MCNC: 2.1 MG/DL — SIGNIFICANT CHANGE UP (ref 1.6–2.6)
MCHC RBC-ENTMCNC: 22.1 PG — LOW (ref 27–34)
MCHC RBC-ENTMCNC: 30.8 G/DL — LOW (ref 32–36)
MCV RBC AUTO: 71.6 FL — LOW (ref 80–100)
METHADONE UR-MCNC: NEGATIVE — SIGNIFICANT CHANGE UP
MONOCYTES # BLD AUTO: 0.33 K/UL — SIGNIFICANT CHANGE UP (ref 0–0.9)
MONOCYTES NFR BLD AUTO: 9.2 % — SIGNIFICANT CHANGE UP (ref 2–14)
NEUTROPHILS # BLD AUTO: 1.33 K/UL — LOW (ref 1.8–7.4)
NEUTROPHILS NFR BLD AUTO: 36.9 % — LOW (ref 43–77)
NITRITE UR-MCNC: NEGATIVE — SIGNIFICANT CHANGE UP
NRBC # BLD: 0 /100 WBCS — SIGNIFICANT CHANGE UP (ref 0–0)
OPIATES UR-MCNC: NEGATIVE — SIGNIFICANT CHANGE UP
PCP SPEC-MCNC: SIGNIFICANT CHANGE UP
PCP UR-MCNC: NEGATIVE — SIGNIFICANT CHANGE UP
PH UR: 6 — SIGNIFICANT CHANGE UP (ref 5–8)
PLATELET # BLD AUTO: 289 K/UL — SIGNIFICANT CHANGE UP (ref 150–400)
POTASSIUM SERPL-MCNC: 3.8 MMOL/L — SIGNIFICANT CHANGE UP (ref 3.5–5.3)
POTASSIUM SERPL-SCNC: 3.8 MMOL/L — SIGNIFICANT CHANGE UP (ref 3.5–5.3)
PROT SERPL-MCNC: 8.5 GM/DL — HIGH (ref 6–8.3)
PROT UR-MCNC: 30 MG/DL
RBC # BLD: 5.8 M/UL — SIGNIFICANT CHANGE UP (ref 4.2–5.8)
RBC # FLD: 20.5 % — HIGH (ref 10.3–14.5)
RBC CASTS # UR COMP ASSIST: ABNORMAL /HPF (ref 0–4)
SALICYLATES SERPL-MCNC: <1.7 MG/DL — LOW (ref 2.8–20)
SODIUM SERPL-SCNC: 140 MMOL/L — SIGNIFICANT CHANGE UP (ref 135–145)
SP GR SPEC: 1.01 — SIGNIFICANT CHANGE UP (ref 1.01–1.02)
THC UR QL: NEGATIVE — SIGNIFICANT CHANGE UP
UROBILINOGEN FLD QL: NEGATIVE MG/DL — SIGNIFICANT CHANGE UP
WBC # BLD: 3.6 K/UL — LOW (ref 3.8–10.5)
WBC # FLD AUTO: 3.6 K/UL — LOW (ref 3.8–10.5)
WBC UR QL: NEGATIVE — SIGNIFICANT CHANGE UP

## 2023-07-05 PROCEDURE — 93010 ELECTROCARDIOGRAM REPORT: CPT

## 2023-07-05 PROCEDURE — 99284 EMERGENCY DEPT VISIT MOD MDM: CPT

## 2023-07-05 PROCEDURE — 70450 CT HEAD/BRAIN W/O DYE: CPT | Mod: 26,MA

## 2023-07-05 PROCEDURE — 74176 CT ABD & PELVIS W/O CONTRAST: CPT | Mod: 26,MA

## 2023-07-05 PROCEDURE — 72125 CT NECK SPINE W/O DYE: CPT | Mod: 26,MA

## 2023-07-05 RX ORDER — HALOPERIDOL DECANOATE 100 MG/ML
5 INJECTION INTRAMUSCULAR ONCE
Refills: 0 | Status: COMPLETED | OUTPATIENT
Start: 2023-07-05 | End: 2023-07-05

## 2023-07-05 RX ORDER — SODIUM CHLORIDE 9 MG/ML
1000 INJECTION INTRAMUSCULAR; INTRAVENOUS; SUBCUTANEOUS ONCE
Refills: 0 | Status: COMPLETED | OUTPATIENT
Start: 2023-07-05 | End: 2023-07-05

## 2023-07-05 RX ORDER — MIDAZOLAM HYDROCHLORIDE 1 MG/ML
4 INJECTION, SOLUTION INTRAMUSCULAR; INTRAVENOUS ONCE
Refills: 0 | Status: DISCONTINUED | OUTPATIENT
Start: 2023-07-05 | End: 2023-07-05

## 2023-07-05 RX ORDER — ACETAMINOPHEN 500 MG
1000 TABLET ORAL ONCE
Refills: 0 | Status: COMPLETED | OUTPATIENT
Start: 2023-07-05 | End: 2023-07-05

## 2023-07-05 RX ORDER — ONDANSETRON 8 MG/1
4 TABLET, FILM COATED ORAL ONCE
Refills: 0 | Status: COMPLETED | OUTPATIENT
Start: 2023-07-05 | End: 2023-07-05

## 2023-07-05 RX ORDER — FAMOTIDINE 10 MG/ML
20 INJECTION INTRAVENOUS ONCE
Refills: 0 | Status: COMPLETED | OUTPATIENT
Start: 2023-07-05 | End: 2023-07-05

## 2023-07-05 RX ADMIN — Medication 50 MILLIGRAM(S): at 16:03

## 2023-07-05 RX ADMIN — SODIUM CHLORIDE 1000 MILLILITER(S): 9 INJECTION INTRAMUSCULAR; INTRAVENOUS; SUBCUTANEOUS at 20:00

## 2023-07-05 RX ADMIN — FAMOTIDINE 20 MILLIGRAM(S): 10 INJECTION INTRAVENOUS at 16:03

## 2023-07-05 RX ADMIN — SODIUM CHLORIDE 1000 MILLILITER(S): 9 INJECTION INTRAMUSCULAR; INTRAVENOUS; SUBCUTANEOUS at 18:31

## 2023-07-05 RX ADMIN — SODIUM CHLORIDE 1000 MILLILITER(S): 9 INJECTION INTRAMUSCULAR; INTRAVENOUS; SUBCUTANEOUS at 16:03

## 2023-07-05 RX ADMIN — MIDAZOLAM HYDROCHLORIDE 4 MILLIGRAM(S): 1 INJECTION, SOLUTION INTRAMUSCULAR; INTRAVENOUS at 21:35

## 2023-07-05 RX ADMIN — SODIUM CHLORIDE 1000 MILLILITER(S): 9 INJECTION INTRAMUSCULAR; INTRAVENOUS; SUBCUTANEOUS at 21:35

## 2023-07-05 RX ADMIN — SODIUM CHLORIDE 1000 MILLILITER(S): 9 INJECTION INTRAMUSCULAR; INTRAVENOUS; SUBCUTANEOUS at 23:00

## 2023-07-05 RX ADMIN — Medication 400 MILLIGRAM(S): at 16:03

## 2023-07-05 RX ADMIN — HALOPERIDOL DECANOATE 5 MILLIGRAM(S): 100 INJECTION INTRAMUSCULAR at 21:35

## 2023-07-05 RX ADMIN — ONDANSETRON 4 MILLIGRAM(S): 8 TABLET, FILM COATED ORAL at 16:03

## 2023-07-05 NOTE — ED ADULT NURSE NOTE - OBJECTIVE STATEMENT
Pt presents a&ox3, irritable, c/o abdominal pain 9/10 for the past 3 days. Pt is also nauseous. Pt is uncooperative and barely respons to questions. When asked where exactly his stomach hurts, he states, " all over" and points to his abdomen. Pt presents a&ox3, irritable, c/o abdominal pain 9/10 for the past 3 days. Pt is also nauseous, vomiting, and diarrhea, no blood in either. Pt is uncooperative and barely responds to questions. When asked where exactly his stomach hurts, he states, " all over" and points to his abdomen. Pt states his last alcoholic drink was "3 or 4 days ago." Pt presents a&ox3, irritable, c/o abdominal pain 7/10 for the past 3 days. Pt is also nauseous, vomiting, and diarrhea, no blood in either. Pt is uncooperative and barely responds to questions. When asked where exactly his stomach hurts, he states, " all over" and points to his abdomen. Pt states his last alcoholic drink was "3 or 4 days ago."

## 2023-07-05 NOTE — ED ADULT TRIAGE NOTE - CHIEF COMPLAINT QUOTE
patient c/o of abdominal pain with N/V started 3 days ago , denied chest pain no difficulty breathing, no chest pain no difficulty breathing

## 2023-07-05 NOTE — ED PROVIDER NOTE - CLINICAL SUMMARY MEDICAL DECISION MAKING FREE TEXT BOX
58y Male with past medical history of alcohol abuse presents to the ER for abdominal pain. Reports generalized abdominal pain associated with nonbloody nonbilious vomiting and nonbloody diarrhea x 3 days with decreased appetite. Last etoh use 3-4 days ago. Vital signs stable, abdomen distended, nontender, +bowel sounds. Concern for gastritis vs pancreatitis vs viral gastroenteritis. Will get labs, meds, IVF, CT, reassess. Dispo pending results. 58y Male with past medical history of alcohol abuse presents to the ER for abdominal pain. Reports generalized abdominal pain associated with nonbloody nonbilious vomiting and nonbloody diarrhea x 3 days with decreased appetite. Last etoh use 3-4 days ago. Vital signs stable, abdomen distended, nontender, +bowel sounds. Concern for gastritis vs pancreatitis vs viral gastroenteritis. Will get labs, meds, IVF, CT, reassess. Dispo pending results.    Pt informed of DC with chronic conditions not in acute withdrawal. will arrange for safe transportation

## 2023-07-05 NOTE — ED PROVIDER NOTE - PROGRESS NOTE DETAILS
PAULA foote: all reuslts reviewed, lactate unchanged despite IVF, alcohol +, pending CT, dispo pending results. PAULA foote: all reuslts reviewed, lactate unchanged despite IVF, alcohol + will need psych consult once alcohol at appropriate level, pending CT, final dispo (admit vs ED hold for psych) pending results. Chaz PADGETT, EM/Toxicology Attending: CT negative, lactate likely secondary to alcohol intoxication and dehydration. Otherwise CIWA 2. Psych to evaluate. Pt has no acute psych issues.  not in WD will D/c

## 2023-07-05 NOTE — ED ADULT NURSE REASSESSMENT NOTE - NS ED NURSE REASSESS COMMENT FT1
Pt became agitated, became verbally aggressive with staff. Deescalation techniques unsuccessful and MD notified, meds given per order with no signs of adverse rxn.

## 2023-07-05 NOTE — ED PROVIDER NOTE - NONTENDER LOCATION
left upper quadrant/right upper quadrant/left lower quadrant/right lower quadrant/suprapubic/left costovertebral angle/right costovertebral angle

## 2023-07-05 NOTE — ED ADULT NURSE NOTE - NSFALLUNIVINTERV_ED_ALL_ED
Bed/Stretcher in lowest position, wheels locked, appropriate side rails in place/Call bell, personal items and telephone in reach/Instruct patient to call for assistance before getting out of bed/chair/stretcher/Non-slip footwear applied when patient is off stretcher/[place] to call system/Physically safe environment - no spills, clutter or unnecessary equipment/Purposeful proactive rounding/Room/bathroom lighting operational, light cord in reach

## 2023-07-05 NOTE — ED PROVIDER NOTE - OBJECTIVE STATEMENT
58y Male with past medical history of alcohol abuse presents to the ER for abdominal pain. Patient reports generalized abdominal pain associated with nonbloody nonbilious vomiting and nonbloody diarrhea x 3 days with decreased appetite. Last etoh use 3-4 days ago. Denies fever, chills, URI symptoms, chest pain, SOB, difficulty breathing, urinary complaints. 58y Male with past medical history of alcohol abuse presents to the ER for abdominal pain. Patient reports generalized abdominal pain associated with nonbloody nonbilious vomiting and nonbloody diarrhea x 3 days with decreased appetite. Last etoh use 3-4 days ago. Denies fever, chills, URI symptoms, chest pain, SOB, difficulty breathing, urinary complaints. Of note, patient reports suicidal ideations without plan, states he has too much going on and does not want to be here anymore. Denies HI, auditory or visual hallucinations.

## 2023-07-06 VITALS — HEART RATE: 67 BPM | SYSTOLIC BLOOD PRESSURE: 138 MMHG | DIASTOLIC BLOOD PRESSURE: 76 MMHG

## 2023-07-06 LAB
CULTURE RESULTS: SIGNIFICANT CHANGE UP
LACTATE SERPL-SCNC: 3 MMOL/L — HIGH (ref 0.7–2)
LACTATE SERPL-SCNC: 4 MMOL/L — CRITICAL HIGH (ref 0.7–2)
SPECIMEN SOURCE: SIGNIFICANT CHANGE UP

## 2023-07-06 RX ORDER — HYDROMORPHONE HYDROCHLORIDE 2 MG/ML
0.5 INJECTION INTRAMUSCULAR; INTRAVENOUS; SUBCUTANEOUS ONCE
Refills: 0 | Status: DISCONTINUED | OUTPATIENT
Start: 2023-07-06 | End: 2023-07-06

## 2023-07-06 RX ORDER — ACETAMINOPHEN 500 MG
650 TABLET ORAL ONCE
Refills: 0 | Status: COMPLETED | OUTPATIENT
Start: 2023-07-06 | End: 2023-07-06

## 2023-07-06 RX ORDER — SODIUM CHLORIDE 9 MG/ML
1000 INJECTION INTRAMUSCULAR; INTRAVENOUS; SUBCUTANEOUS ONCE
Refills: 0 | Status: COMPLETED | OUTPATIENT
Start: 2023-07-06 | End: 2023-07-06

## 2023-07-06 RX ADMIN — Medication 650 MILLIGRAM(S): at 07:58

## 2023-07-06 RX ADMIN — Medication 2 MILLIGRAM(S): at 01:04

## 2023-07-06 RX ADMIN — Medication 50 MILLIGRAM(S): at 07:58

## 2023-07-06 RX ADMIN — SODIUM CHLORIDE 1000 MILLILITER(S): 9 INJECTION INTRAMUSCULAR; INTRAVENOUS; SUBCUTANEOUS at 04:08

## 2023-07-06 RX ADMIN — HYDROMORPHONE HYDROCHLORIDE 0.5 MILLIGRAM(S): 2 INJECTION INTRAMUSCULAR; INTRAVENOUS; SUBCUTANEOUS at 09:36

## 2023-07-06 NOTE — ED ADULT NURSE REASSESSMENT NOTE - NS ED NURSE REASSESS COMMENT FT1
Received report A&Ox4, breathing even and unlabored breaths, resting comfortably on stretcher. Patient on 1:1 constant observation for suicidal ideations, PCA at bedside. No acute distress noted or verbalized.

## 2023-07-06 NOTE — ED ADULT NURSE REASSESSMENT NOTE - NS ED NURSE REASSESS COMMENT FT1
1:1 constant observation discontinued by Dr. Cotto at 10:02am. Patient discharged, awaiting security to bring belongings to patient. No acute distress noted or verbalized.

## 2023-07-06 NOTE — ED ADULT NURSE REASSESSMENT NOTE - NS ED NURSE REASSESS COMMENT FT1
Pt sleeping in bed. Fluids infusing, NAD noted at this time. CIWA scores stable. CO and safety measures in place.

## 2023-07-06 NOTE — ED BEHAVIORAL HEALTH NOTE - BEHAVIORAL HEALTH NOTE
Consult canceled by Dr. Cotto. The patient was not evaluated by Telepsychiatry- no contact was made by any member of Telepsychiatry team with patient or collateral sources of information.  Dr. Cotto informed to consult Telepsychiatry as needed.

## 2023-07-12 ENCOUNTER — INPATIENT (INPATIENT)
Facility: HOSPITAL | Age: 58
LOS: 20 days | Discharge: HOME | End: 2023-08-02
Attending: INTERNAL MEDICINE | Admitting: INTERNAL MEDICINE
Payer: MEDICAID

## 2023-07-12 VITALS
SYSTOLIC BLOOD PRESSURE: 141 MMHG | WEIGHT: 177.91 LBS | HEIGHT: 67 IN | HEART RATE: 136 BPM | TEMPERATURE: 97 F | DIASTOLIC BLOOD PRESSURE: 108 MMHG | RESPIRATION RATE: 22 BRPM | OXYGEN SATURATION: 96 %

## 2023-07-12 LAB
ALBUMIN SERPL ELPH-MCNC: 4.1 G/DL — SIGNIFICANT CHANGE UP (ref 3.3–5)
ALP SERPL-CCNC: 66 U/L — SIGNIFICANT CHANGE UP (ref 40–120)
ALT FLD-CCNC: 42 U/L — SIGNIFICANT CHANGE UP (ref 12–78)
AMPHET UR-MCNC: NEGATIVE — SIGNIFICANT CHANGE UP
ANION GAP SERPL CALC-SCNC: 14 MMOL/L — SIGNIFICANT CHANGE UP (ref 5–17)
ANISOCYTOSIS BLD QL: SLIGHT — SIGNIFICANT CHANGE UP
APAP SERPL-MCNC: < 2 UG/ML (ref 10–30)
AST SERPL-CCNC: 66 U/L — HIGH (ref 15–37)
BARBITURATES UR SCN-MCNC: NEGATIVE — SIGNIFICANT CHANGE UP
BASOPHILS # BLD AUTO: 0.03 K/UL — SIGNIFICANT CHANGE UP (ref 0–0.2)
BASOPHILS NFR BLD AUTO: 0.8 % — SIGNIFICANT CHANGE UP (ref 0–2)
BENZODIAZ UR-MCNC: POSITIVE — SIGNIFICANT CHANGE UP
BILIRUB SERPL-MCNC: 0.8 MG/DL — SIGNIFICANT CHANGE UP (ref 0.2–1.2)
BUN SERPL-MCNC: 15 MG/DL — SIGNIFICANT CHANGE UP (ref 7–23)
CALCIUM SERPL-MCNC: 8.9 MG/DL — SIGNIFICANT CHANGE UP (ref 8.5–10.1)
CHLORIDE SERPL-SCNC: 106 MMOL/L — SIGNIFICANT CHANGE UP (ref 96–108)
CO2 SERPL-SCNC: 22 MMOL/L — SIGNIFICANT CHANGE UP (ref 22–31)
COCAINE METAB.OTHER UR-MCNC: NEGATIVE — SIGNIFICANT CHANGE UP
CREAT SERPL-MCNC: 1.27 MG/DL — SIGNIFICANT CHANGE UP (ref 0.5–1.3)
EGFR: 66 ML/MIN/1.73M2 — SIGNIFICANT CHANGE UP
EOSINOPHIL # BLD AUTO: 0.02 K/UL — SIGNIFICANT CHANGE UP (ref 0–0.5)
EOSINOPHIL NFR BLD AUTO: 0.5 % — SIGNIFICANT CHANGE UP (ref 0–6)
ETHANOL SERPL-MCNC: 276 MG/DL — HIGH (ref 0–10)
GLUCOSE SERPL-MCNC: 97 MG/DL — SIGNIFICANT CHANGE UP (ref 70–99)
HCT VFR BLD CALC: 40.3 % — SIGNIFICANT CHANGE UP (ref 39–50)
HGB BLD-MCNC: 12.6 G/DL — LOW (ref 13–17)
HYPOCHROMIA BLD QL: SLIGHT — SIGNIFICANT CHANGE UP
IMM GRANULOCYTES NFR BLD AUTO: 0.3 % — SIGNIFICANT CHANGE UP (ref 0–0.9)
LACTATE SERPL-SCNC: 6.9 MMOL/L — CRITICAL HIGH (ref 0.7–2)
LIDOCAIN IGE QN: 235 U/L — SIGNIFICANT CHANGE UP (ref 73–393)
LYMPHOCYTES # BLD AUTO: 1.45 K/UL — SIGNIFICANT CHANGE UP (ref 1–3.3)
LYMPHOCYTES # BLD AUTO: 39.1 % — SIGNIFICANT CHANGE UP (ref 13–44)
MANUAL SMEAR VERIFICATION: SIGNIFICANT CHANGE UP
MCHC RBC-ENTMCNC: 22.8 PG — LOW (ref 27–34)
MCHC RBC-ENTMCNC: 31.3 G/DL — LOW (ref 32–36)
MCV RBC AUTO: 72.9 FL — LOW (ref 80–100)
METHADONE UR-MCNC: NEGATIVE — SIGNIFICANT CHANGE UP
MICROCYTES BLD QL: SLIGHT — SIGNIFICANT CHANGE UP
MONOCYTES # BLD AUTO: 0.26 K/UL — SIGNIFICANT CHANGE UP (ref 0–0.9)
MONOCYTES NFR BLD AUTO: 7 % — SIGNIFICANT CHANGE UP (ref 2–14)
NEUTROPHILS # BLD AUTO: 1.94 K/UL — SIGNIFICANT CHANGE UP (ref 1.8–7.4)
NEUTROPHILS NFR BLD AUTO: 52.3 % — SIGNIFICANT CHANGE UP (ref 43–77)
NRBC # BLD: 0 /100 WBCS — SIGNIFICANT CHANGE UP (ref 0–0)
OPIATES UR-MCNC: NEGATIVE — SIGNIFICANT CHANGE UP
OVALOCYTES BLD QL SMEAR: SLIGHT — SIGNIFICANT CHANGE UP
PCP SPEC-MCNC: SIGNIFICANT CHANGE UP
PCP UR-MCNC: NEGATIVE — SIGNIFICANT CHANGE UP
PLAT MORPH BLD: NORMAL — SIGNIFICANT CHANGE UP
PLATELET # BLD AUTO: 192 K/UL — SIGNIFICANT CHANGE UP (ref 150–400)
POIKILOCYTOSIS BLD QL AUTO: SLIGHT — SIGNIFICANT CHANGE UP
POTASSIUM SERPL-MCNC: 4 MMOL/L — SIGNIFICANT CHANGE UP (ref 3.5–5.3)
POTASSIUM SERPL-SCNC: 4 MMOL/L — SIGNIFICANT CHANGE UP (ref 3.5–5.3)
PROT SERPL-MCNC: 8.6 GM/DL — HIGH (ref 6–8.3)
RBC # BLD: 5.53 M/UL — SIGNIFICANT CHANGE UP (ref 4.2–5.8)
RBC # FLD: 21.8 % — HIGH (ref 10.3–14.5)
RBC BLD AUTO: ABNORMAL
SALICYLATES SERPL-MCNC: <1.7 MG/DL — LOW (ref 2.8–20)
SODIUM SERPL-SCNC: 142 MMOL/L — SIGNIFICANT CHANGE UP (ref 135–145)
THC UR QL: NEGATIVE — SIGNIFICANT CHANGE UP
WBC # BLD: 3.71 K/UL — LOW (ref 3.8–10.5)
WBC # FLD AUTO: 3.71 K/UL — LOW (ref 3.8–10.5)

## 2023-07-12 PROCEDURE — 99282 EMERGENCY DEPT VISIT SF MDM: CPT

## 2023-07-12 PROCEDURE — 93010 ELECTROCARDIOGRAM REPORT: CPT

## 2023-07-12 PROCEDURE — 99291 CRITICAL CARE FIRST HOUR: CPT

## 2023-07-12 RX ORDER — SODIUM CHLORIDE 9 MG/ML
1000 INJECTION INTRAMUSCULAR; INTRAVENOUS; SUBCUTANEOUS ONCE
Refills: 0 | Status: COMPLETED | OUTPATIENT
Start: 2023-07-12 | End: 2023-07-12

## 2023-07-12 RX ORDER — THIAMINE MONONITRATE (VIT B1) 100 MG
100 TABLET ORAL DAILY
Refills: 0 | Status: COMPLETED | OUTPATIENT
Start: 2023-07-12 | End: 2023-07-17

## 2023-07-12 RX ORDER — DIAZEPAM 5 MG
10 TABLET ORAL ONCE
Refills: 0 | Status: DISCONTINUED | OUTPATIENT
Start: 2023-07-12 | End: 2023-07-12

## 2023-07-12 RX ORDER — PANTOPRAZOLE SODIUM 20 MG/1
40 TABLET, DELAYED RELEASE ORAL DAILY
Refills: 0 | Status: DISCONTINUED | OUTPATIENT
Start: 2023-07-12 | End: 2023-07-23

## 2023-07-12 RX ORDER — THIAMINE MONONITRATE (VIT B1) 100 MG
100 TABLET ORAL ONCE
Refills: 0 | Status: COMPLETED | OUTPATIENT
Start: 2023-07-12 | End: 2023-07-12

## 2023-07-12 RX ORDER — MORPHINE SULFATE 50 MG/1
2 CAPSULE, EXTENDED RELEASE ORAL EVERY 4 HOURS
Refills: 0 | Status: DISCONTINUED | OUTPATIENT
Start: 2023-07-12 | End: 2023-07-17

## 2023-07-12 RX ORDER — SODIUM CHLORIDE 9 MG/ML
1000 INJECTION, SOLUTION INTRAVENOUS
Refills: 0 | Status: DISCONTINUED | OUTPATIENT
Start: 2023-07-12 | End: 2023-07-17

## 2023-07-12 RX ORDER — PANTOPRAZOLE SODIUM 20 MG/1
40 TABLET, DELAYED RELEASE ORAL
Refills: 0 | Status: DISCONTINUED | OUTPATIENT
Start: 2023-07-12 | End: 2023-07-12

## 2023-07-12 RX ADMIN — SODIUM CHLORIDE 100 MILLILITER(S): 9 INJECTION, SOLUTION INTRAVENOUS at 21:21

## 2023-07-12 RX ADMIN — SODIUM CHLORIDE 1000 MILLILITER(S): 9 INJECTION INTRAMUSCULAR; INTRAVENOUS; SUBCUTANEOUS at 17:02

## 2023-07-12 RX ADMIN — Medication 10 MILLIGRAM(S): at 17:02

## 2023-07-12 RX ADMIN — Medication 10 MILLIGRAM(S): at 15:33

## 2023-07-12 RX ADMIN — Medication 100 MILLIGRAM(S): at 18:08

## 2023-07-12 RX ADMIN — SODIUM CHLORIDE 100 MILLILITER(S): 9 INJECTION, SOLUTION INTRAVENOUS at 19:00

## 2023-07-12 RX ADMIN — MORPHINE SULFATE 2 MILLIGRAM(S): 50 CAPSULE, EXTENDED RELEASE ORAL at 20:50

## 2023-07-12 RX ADMIN — Medication 2 MILLIGRAM(S): at 21:19

## 2023-07-12 RX ADMIN — Medication 1 TABLET(S): at 18:08

## 2023-07-12 RX ADMIN — MORPHINE SULFATE 2 MILLIGRAM(S): 50 CAPSULE, EXTENDED RELEASE ORAL at 23:09

## 2023-07-12 RX ADMIN — SODIUM CHLORIDE 1000 MILLILITER(S): 9 INJECTION INTRAMUSCULAR; INTRAVENOUS; SUBCUTANEOUS at 15:33

## 2023-07-12 NOTE — ED ADULT NURSE REASSESSMENT NOTE - NS ED NURSE REASSESS COMMENT FT1
Pt CIWA decreased from 21 to 13 to 6 as of 1840 after valium 10 mg x2.  Pt is still expressing SI but is calmer. Still requesting more pain meds.  Safety maintained on CO.

## 2023-07-12 NOTE — ED ADULT NURSE NOTE - NSFALLRISKINTERV_ED_ALL_ED
Assistance OOB with selected safe patient handling equipment if applicable/Assistance with ambulation/Communicate fall risk and risk factors to all staff, patient, and family/Monitor gait and stability/Monitor for mental status changes and reorient to person, place, and time, as needed/Provide visual cue: yellow wristband, yellow gown, etc/Reinforce activity limits and safety measures with patient and family/Toileting schedule using arm’s reach rule for commode and bathroom/Use of alarms - bed, stretcher, chair and/or video monitoring/Call bell, personal items and telephone in reach/Instruct patient to call for assistance before getting out of bed/chair/stretcher/Non-slip footwear applied when patient is off stretcher/[place] to call system/Physically safe environment - no spills, clutter or unnecessary equipment/Purposeful Proactive Rounding/Room/bathroom lighting operational, light cord in reach

## 2023-07-12 NOTE — SBIRT NOTE ADULT - NSSBIRTUNABLESCR_GEN_A_CORE
Pt difficult to redirect to assessment questions.   General discussion regarding alcohol use. Patient unable to identify triggers. Reiterated SI and stated that he would stop drinking once he leaves the hospital. Not open to discussion about support/counseling in the community./Patient uncooperative

## 2023-07-12 NOTE — ED ADULT NURSE NOTE - OBJECTIVE STATEMENT
58 yr old male AO x3 c/o abdominal pain and SI.  Pt reports generalized abdominal pain because he "drinks all day every single day." Pt voices wanting to kill him self and asks staff to "please kill me." Pt placed on constant observation.  Pt reports N/V. Pt has a PMH of ETOH abuse, PUD, and DTs.  Pt requesting pain medication.  Safety being maintained.

## 2023-07-12 NOTE — ED ADULT TRIAGE NOTE - CHIEF COMPLAINT QUOTE
Patient c/o severe abdominal pains and vomiting, burning all over his body and added that he wants to die, don't want to live any more because he drinks too much alcohol. Wants doctor to give him an injection to kill him.

## 2023-07-12 NOTE — H&P ADULT - NSHPPHYSICALEXAM_GEN_ALL_CORE
PHYSICAL EXAMINATION:  Vital Signs Last 24 Hrs  T(C): 36.6 (12 Jul 2023 15:06), Max: 36.6 (12 Jul 2023 15:06)  T(F): 97.8 (12 Jul 2023 15:06), Max: 97.8 (12 Jul 2023 15:06)  HR: 117 (12 Jul 2023 15:06) (117 - 136)  BP: 136/88 (12 Jul 2023 15:06) (136/88 - 141/108)  BP(mean): --  RR: 26 (12 Jul 2023 15:06) (22 - 26)  SpO2: 97% (12 Jul 2023 15:06) (96% - 97%)    Parameters below as of 12 Jul 2023 15:06  Patient On (Oxygen Delivery Method): room air      CAPILLARY BLOOD GLUCOSE          GENERAL: NAD,   ENMT: mucous membranes moist  NECK: supple, No JVD  CHEST/LUNG: clear to auscultation bilaterally; no rales, rhonchi, or wheezing b/l  HEART: normal S1, S2  ABDOMEN: BS+, soft, ND, NT   EXTREMITIES:  pulses palpable; no clubbing, cyanosis, or edema b/l LEs  NEURO: awake, alert, interactive; moves all extremities PHYSICAL EXAMINATION:  Vital Signs Last 24 Hrs  T(C): 36.6 (12 Jul 2023 15:06), Max: 36.6 (12 Jul 2023 15:06)  T(F): 97.8 (12 Jul 2023 15:06), Max: 97.8 (12 Jul 2023 15:06)  HR: 117 (12 Jul 2023 15:06) (117 - 136)  BP: 136/88 (12 Jul 2023 15:06) (136/88 - 141/108)  BP(mean): --  RR: 26 (12 Jul 2023 15:06) (22 - 26)  SpO2: 97% (12 Jul 2023 15:06) (96% - 97%)    Parameters below as of 12 Jul 2023 15:06  Patient On (Oxygen Delivery Method): room air      CAPILLARY BLOOD GLUCOSE          GENERAL: NAD, seen in ER, anxious, follows commands, admits to excessive drinking.   ENMT: mucous membranes moist  NECK: supple, No JVD  CHEST/LUNG: clear to auscultation bilaterally; no rales, rhonchi, or wheezing b/l  HEART: normal S1, S2  ABDOMEN: BS+, soft, ND, NT   EXTREMITIES:  pulses palpable; no clubbing, cyanosis, or edema b/l LEs  NEURO: awake, alert, interactive; moves all extremities

## 2023-07-12 NOTE — PATIENT PROFILE ADULT - HOME ACCESSIBILITY CONCERNS
The patient has been re-examined and I agree with the above assessment or I updated with my findings. none

## 2023-07-12 NOTE — ED PROVIDER NOTE - CLINICAL SUMMARY MEDICAL DECISION MAKING FREE TEXT BOX
59 y/o M hx of alcohol withdrawals, SI presents w/ abdominal pain for past 1 day. endorsing suicidal ideation because "he drinks too much." endorsing last drink at 3 or 4 AM today, endorsing drinking alcohol everyday. asking for medication to kill him.   will reassess for psych evaluation s/p medical optimization. likely in etoh withdrawal, tremulous, tachycardic to 110s w/ tongue fasciculations. CIWA 21 on arrival, improved to 13 s/p 1x valium. will give fluids, additional dose of benzos. admit to tele for etoh withdrawal, SI. denies HI. EKg sinus tachycardia, no stemi.

## 2023-07-12 NOTE — H&P ADULT - HISTORY OF PRESENT ILLNESS
57 y/o male PMH of alcohol withdrawals, SI presents with abdominal pain for past 1 day.  Endorsing suicidal ideation because "he drinks too much." endorsing last drink at 3 or 4 AM today, endorsing drinking alcohol everyday.  Asking for medication to kill him.  Notes epigastric abdominal pain, nausea and multiple episodes of emesis.  Denies falls.  Denies chest pain/sob, fever/chills.  57 y/o male PMH of alcohol withdrawals, SI presents with abdominal pain for past 1 day.  Endorsing suicidal ideation because "he drinks too much." endorsing last drink at 3 or 4 AM today, endorsing drinking alcohol everyday.  Asking for medication to kill him.  Notes epigastric abdominal pain, nausea and multiple episodes of emesis.  Denies falls.  Denies chest pain/sob, fever/chills. Not willing to list daily   ETOH intake, had normal CT of abdomen 7/05/23.  57 y/o male PMH of alcohol withdrawals, SI presents with abdominal pain for past 1 day.  Endorsing suicidal ideation because "he drinks too much." endorsing last drink at 3 or 4 AM today, endorsing drinking alcohol everyday.  Asking for medication to kill him.  Notes epigastric abdominal pain, nausea and multiple episodes of emesis.  Denies falls.  Denies chest pain/sob, fever/chills. Not willing to list daily ETOH intake, had normal CT of abdomen 7/05/23.

## 2023-07-12 NOTE — H&P ADULT - NSHPLABSRESULTS_GEN_ALL_CORE
LABS:                        12.6   3.71  )-----------( 192      ( 12 Jul 2023 15:27 )             40.3     07-12    142  |  106  |  15  ----------------------------<  97  4.0   |  22  |  1.27    Ca    8.9      12 Jul 2023 15:27    TPro  8.6<H>  /  Alb  4.1  /  TBili  0.8  /  DBili  x   /  AST  66<H>  /  ALT  42  /  AlkPhos  66  07-12      Urinalysis Basic - ( 12 Jul 2023 15:27 )    Color: x / Appearance: x / SG: x / pH: x  Gluc: 97 mg/dL / Ketone: x  / Bili: x / Urobili: x   Blood: x / Protein: x / Nitrite: x   Leuk Esterase: x / RBC: x / WBC x   Sq Epi: x / Non Sq Epi: x / Bacteria: x          RADIOLOGY & ADDITIONAL TESTS:

## 2023-07-12 NOTE — ED PROVIDER NOTE - PHYSICAL EXAMINATION
General: tremulous appearing male in no acute distress, +tongue fasciculations   HEENT: Normocephalic, atraumatic. Moist mucous membranes. Oropharynx clear. No lymphadenopathy.  Eyes: No scleral icterus. EOMI. ANDREA.  Neck:. Soft and supple. Full ROM without pain. No midline tenderness  Cardiac: Regular rate and regular rhythm. No murmurs, rubs, gallops. Peripheral pulses 2+ and symmetric. No LE edema.  Resp: Lungs CTAB. Speaking in full sentences. No wheezes, rales or rhonchi.  Abd: Soft, non-tender, non-distended. No guarding or rebound. No scars, masses, or lesions.  Back: Spine midline and non-tender. No CVA tenderness.    Skin: No rashes, abrasions, or lacerations.  Neuro: AO x 3. Moves all extremities symmetrically. Motor strength and sensation grossly intact.

## 2023-07-12 NOTE — ED PROVIDER NOTE - OBJECTIVE STATEMENT
59 y/o M hx of alcohol withdrawals, SI presents w/ abdominal pain for past 1 day. endorsing suicidal ideation because "he drinks too much." endorsing last drink at 3 or 4 AM today, endorsing drinking alcohol everyday. asking for medication to kill him. denies homicidal ideation. endorsing epigastric abdominal pain. endorsing nausea and multiple episodes of emesis. denies falls. denies chest pain/sob. denies fever/chills.

## 2023-07-12 NOTE — H&P ADULT - ASSESSMENT
57 y/o male PMH of alcohol withdrawals, SI presents with abdominal pain for past 1 day.  Endorsing suicidal ideation because "he drinks too much." endorsing last drink at 3 or 4 AM today, endorsing drinking alcohol everyday.  Asking for medication to kill him.  Notes epigastric abdominal pain, nausea and multiple episodes of emesis.  Denies falls.  Denies chest pain/sob, fever/chills.     Plan:   57 y/o male PMH of alcohol withdrawals, SI presents with abdominal pain for past 1 day.  Endorsing suicidal ideation because "he drinks too much." endorsing last drink at 3 or 4 AM today, endorsing drinking alcohol everyday.  Asking for medication to kill him.  Notes epigastric abdominal pain, nausea and multiple episodes of emesis.  Denies falls.  Denies chest pain/sob, fever/chills.       Plan:  Admit for ETOH withdrawal. CIWA score initially was 21 then 13 then 6. Responded well to IVP Ativan in ER. Lipase normal   at 235, ETOH level high at 276.  Lactate high at 6.9, T.bili normal, mild increase in LFT. Normal CT of abdomen 7/05/23, will not repeat now.   Will hydrate with elevated lactate, repeat in AM.       Will start standing CIWA protocol with IV Ativan, consider changing to PRN Ativan in AM if less anxious.  59 y/o male PMH of alcohol withdrawals, SI presents with abdominal pain for past 1 day.  Endorsing suicidal ideation because "he drinks too much." endorsing last drink at 3 or 4 AM today, endorsing drinking alcohol everyday.  Asking for medication to kill him.  Notes epigastric abdominal pain, nausea and multiple episodes of emesis.  Denies falls.  Denies chest pain/sob, fever/chills.       Plan:  Admit for ETOH withdrawal. CIWA score initially was 21 then 13 then 6. Responded well to IVP Ativan in ER. Lipase normal   at 235, ETOH level high at 276.  Lactate high at 6.9, T.bili normal, mild increase in LFT. Normal CT of abdomen 7/05/23, will not repeat now.   Will hydrate with elevated lactate, repeat in AM.       Will start standing CIWA protocol with IV Ativan 2 mg every 4 hours, consider changing to PRN Ativan in AM if CIWA score allows.

## 2023-07-12 NOTE — PATIENT PROFILE ADULT - FALL HARM RISK - HARM RISK INTERVENTIONS
Assistance with ambulation/Assistance OOB with selected safe patient handling equipment/Communicate Risk of Fall with Harm to all staff/Monitor for mental status changes/Monitor gait and stability/Reinforce activity limits and safety measures with patient and family/Tailored Fall Risk Interventions/Toileting schedule using arm’s reach rule for commode and bathroom/Use of alarms - bed, chair and/or voice tab/Visual Cue: Yellow wristband and red socks/Bed in lowest position, wheels locked, appropriate side rails in place/Call bell, personal items and telephone in reach/Instruct patient to call for assistance before getting out of bed or chair/Non-slip footwear when patient is out of bed/[place] to call system/Physically safe environment - no spills, clutter or unnecessary equipment/Purposeful Proactive Rounding/Room/bathroom lighting operational, light cord in reach

## 2023-07-12 NOTE — PATIENT PROFILE ADULT - FALL HARM RISK - PATIENT NEEDS ASSISTANCE
[facility] Note    Frank Sharp   68 y.o. male    Date of Visit: 2/13/2018  Chief Complaint   Patient presents with     Skin Problem     skin tag under right arm X 1 week; painful       ASSESSMENT/PLAN  1. Skin tag  Surgical Pathology Exam   2. Colon cancer screening       ---------------------------------------------    1.  Skin tag electively removed for comfort, but also for pathology review since it changed.  I have low suspicion of malignancy.    2.  Reminded to do fecal occult cards, due in April.    Return if symptoms worsen or fail to improve, for Next scheduled follow up.      SUBJECTIVE  Frank Sharp is a 68-year-old man who presents for painful skin tag.  This is located in the right axilla.  He mentions that it changed in appearance, used to be small, now it is large and there is a second part growing next to it.  It feels a little better today.  There is been no drainage.  He would like this removed.      Medications, allergies, and problem list were reviewed and updated    Patient Active Problem List   Diagnosis     Back Pain     Essential Hypercholesterolemia     Visual Impairment In The Right Eye     Chronic sinusitis     Insomnia due to medical condition     Chronic pain of both knees     Benign non-nodular prostatic hyperplasia with lower urinary tract symptoms     No past medical history on file.  Current Outpatient Prescriptions   Medication Sig Dispense Refill     tamsulosin (FLOMAX) 0.4 mg Cp24 Take 1 capsule (0.4 mg total) by mouth daily after supper. 30 capsule 11     atorvastatin (LIPITOR) 10 MG tablet Take 1 tablet (10 mg total) by mouth daily. 30 tablet 11     fluticasone (FLONASE) 50 mcg/actuation nasal spray 2 sprays into each nostril daily. 10 g 11     multivitamin with minerals (THERA-M) 9 mg iron-400 mcg Tab tablet Take 1 tablet by mouth daily.       No current facility-administered medications for this visit.      No Known Allergies    EXAM  Vitals:    02/13/18 1007  "  BP: 124/70   Patient Site: Left Arm   Patient Position: Sitting   Cuff Size: Adult Regular   Pulse: 78   SpO2: 98%   Weight: 188 lb 1.6 oz (85.3 kg)   Height: 5' 5.5\" (1.664 m)         General: Alert, no distress  Skin: There is a pedunculated skin tag in the right axilla, without surrounding erythema.  This appears to be a bilobed skin tag.    See separate procedure note      Misbah Muniz, DO  Internal Medicine  [facility]    " Standing

## 2023-07-13 LAB
ANION GAP SERPL CALC-SCNC: 9 MMOL/L — SIGNIFICANT CHANGE UP (ref 5–17)
BUN SERPL-MCNC: 12 MG/DL — SIGNIFICANT CHANGE UP (ref 7–23)
CALCIUM SERPL-MCNC: 8 MG/DL — LOW (ref 8.5–10.1)
CHLORIDE SERPL-SCNC: 106 MMOL/L — SIGNIFICANT CHANGE UP (ref 96–108)
CO2 SERPL-SCNC: 23 MMOL/L — SIGNIFICANT CHANGE UP (ref 22–31)
CREAT SERPL-MCNC: 0.86 MG/DL — SIGNIFICANT CHANGE UP (ref 0.5–1.3)
EGFR: 102 ML/MIN/1.73M2 — SIGNIFICANT CHANGE UP
GLUCOSE SERPL-MCNC: 72 MG/DL — SIGNIFICANT CHANGE UP (ref 70–99)
LACTATE SERPL-SCNC: 1 MMOL/L — SIGNIFICANT CHANGE UP (ref 0.7–2)
POTASSIUM SERPL-MCNC: 3.6 MMOL/L — SIGNIFICANT CHANGE UP (ref 3.5–5.3)
POTASSIUM SERPL-SCNC: 3.6 MMOL/L — SIGNIFICANT CHANGE UP (ref 3.5–5.3)
SODIUM SERPL-SCNC: 138 MMOL/L — SIGNIFICANT CHANGE UP (ref 135–145)

## 2023-07-13 PROCEDURE — 99232 SBSQ HOSP IP/OBS MODERATE 35: CPT

## 2023-07-13 RX ORDER — SIMETHICONE 80 MG/1
80 TABLET, CHEWABLE ORAL EVERY 6 HOURS
Refills: 0 | Status: DISCONTINUED | OUTPATIENT
Start: 2023-07-13 | End: 2023-07-18

## 2023-07-13 RX ADMIN — Medication 1 TABLET(S): at 12:57

## 2023-07-13 RX ADMIN — Medication 2 MILLIGRAM(S): at 09:49

## 2023-07-13 RX ADMIN — Medication 2 MILLIGRAM(S): at 00:00

## 2023-07-13 RX ADMIN — Medication 2 MILLIGRAM(S): at 13:55

## 2023-07-13 RX ADMIN — Medication 2 MILLIGRAM(S): at 02:04

## 2023-07-13 RX ADMIN — SIMETHICONE 80 MILLIGRAM(S): 80 TABLET, CHEWABLE ORAL at 04:35

## 2023-07-13 RX ADMIN — Medication 2 MILLIGRAM(S): at 17:18

## 2023-07-13 RX ADMIN — SODIUM CHLORIDE 100 MILLILITER(S): 9 INJECTION, SOLUTION INTRAVENOUS at 07:33

## 2023-07-13 RX ADMIN — Medication 100 MILLIGRAM(S): at 13:59

## 2023-07-13 RX ADMIN — Medication 2 MILLIGRAM(S): at 05:22

## 2023-07-13 RX ADMIN — Medication 2 MILLIGRAM(S): at 20:58

## 2023-07-13 RX ADMIN — PANTOPRAZOLE SODIUM 40 MILLIGRAM(S): 20 TABLET, DELAYED RELEASE ORAL at 12:57

## 2023-07-13 NOTE — ED ADULT NURSE NOTE - HOW MANY DRINKS CONTAINING ALCOHOL DO YOU HAVE ON A TYPICAL DAY WHEN YOU ARE DRINKING?
Trmeayne Dos Santos)  EndocrinologyMetabDiabetes; Internal Medicine  [address] [place] [address]  Phone: [phone]  Fax: [phone]  Follow Up Time:    5 or 6

## 2023-07-13 NOTE — PROGRESS NOTE ADULT - ASSESSMENT
59 y/o male PMH of alcohol withdrawals, SI presents with abdominal pain for past 1 day.  Endorsing suicidal ideation because "he drinks too much." endorsing last drink at 3 or 4 AM today, endorsing drinking alcohol everyday.  Asking for medication to kill him.  Notes epigastric abdominal pain, nausea and multiple episodes of emesis.  Denies falls.  Denies chest pain/sob, fever/chills.       alcoholism    place patient on withdrawal protocol and monitor and treatment based on the protocol   - Ativan/Librium based on score of withdrawal   - monitor BMP and LFTs   - MVI/folate/Thiamine   - psychiatry consult  as outpatient  will eval for depression  Because his compliance with treatments may be impacted by depression         Plan:  Admit for ETOH withdrawal. CIWA score initially was 21 then 13 then 6. Responded well to IVP Ativan in ER. Lipase normal   at 235, ETOH level high at 276.  Lactate high at 6.9, T.bili normal, mild increase in LFT. Normal CT of abdomen 7/05/23, will not repeat now.   Will hydrate with elevated lactate, repeat in AM.       Will start standing CIWA protocol with IV Ativan 2 mg every 4 hours, consider changing to PRN Ativan in AM if CIWA score allows.

## 2023-07-13 NOTE — PROGRESS NOTE ADULT - SUBJECTIVE AND OBJECTIVE BOX
Patient is a 58y old  Male who presents with a chief complaint of ETOH withdrawal. (12 Jul 2023 17:51)      INTERVAL HPI/OVERNIGHT EVENTS: still anxious   denies cp palpitation and sob      MEDICATIONS  (STANDING):  LORazepam   Injectable   IV Push   LORazepam   Injectable 2 milliGRAM(s) IV Push every 4 hours  LORazepam   Injectable 1.5 milliGRAM(s) IV Push every 4 hours  multivitamin 1 Tablet(s) Oral daily  pantoprazole  Injectable 40 milliGRAM(s) IV Push daily  sodium chloride 0.45%. 1000 milliLiter(s) (100 mL/Hr) IV Continuous <Continuous>  thiamine Injectable 100 milliGRAM(s) IV Push daily    MEDICATIONS  (PRN):  LORazepam   Injectable 2 milliGRAM(s) IntraMuscular every 1 hour PRN Symptom-triggered: each CIWA -Ar score 8 or GREATER  LORazepam   Injectable 2 milliGRAM(s) IntraMuscular once PRN no IV Access  morphine  - Injectable 2 milliGRAM(s) IV Push every 4 hours PRN Mild Pain (1 - 3)  simethicone 80 milliGRAM(s) Chew every 6 hours PRN Gas      Allergies    No Known Allergies    Intolerances        REVIEW OF SYSTEMS:  CONSTITUTIONAL: No fever, weight loss, or fatigue  EYES: No eye pain, visual disturbances, or discharge  ENMT:  No difficulty hearing, tinnitus, vertigo; No sinus or throat pain  NECK: No pain or stiffness  BREASTS: No pain, masses, or nipple discharge  RESPIRATORY: No cough, wheezing, chills or hemoptysis; No shortness of breath  CARDIOVASCULAR: No chest pain, palpitations, dizziness, or leg swelling  GASTROINTESTINAL: No abdominal or epigastric pain. No nausea, vomiting, or hematemesis; No diarrhea or constipation. No melena or hematochezia.  GENITOURINARY: No dysuria, frequency, hematuria, or incontinence  NEUROLOGICAL:  anxious .  SKIN: No itching, burning, rashes, or lesions   LYMPH NODES: No enlarged glands  ENDOCRINE: No heat or cold intolerance; No hair loss  MUSCULOSKELETAL: No joint pain or swelling; No muscle, back, or extremity pain  PSYCHIATRIC: No depression, anxiety, mood swings, or difficulty sleeping  HEME/LYMPH: No easy bruising, or bleeding gums  ALLERGY AND IMMUNOLOGIC: No hives or eczema    Vital Signs Last 24 Hrs  T(C): 36.4 (13 Jul 2023 04:21), Max: 36.9 (12 Jul 2023 19:37)  T(F): 97.5 (13 Jul 2023 04:21), Max: 98.5 (12 Jul 2023 19:37)  HR: 58 (13 Jul 2023 04:21) (58 - 136)  BP: 138/79 (13 Jul 2023 04:21) (136/87 - 158/89)  BP(mean): --  RR: 18 (13 Jul 2023 04:21) (18 - 26)  SpO2: 96% (13 Jul 2023 04:21) (96% - 98%)    Parameters below as of 13 Jul 2023 04:21  Patient On (Oxygen Delivery Method): room air        PHYSICAL EXAM:  GENERAL: NAD, well-groomed, well-developed  HEAD:  Atraumatic, Normocephalic  EYES: EOMI, PERRLA, conjunctiva and sclera clear  ENMT: No tonsillar erythema, exudates, or enlargement; Moist mucous membranes, Good dentition, No lesions  NECK: Supple, No JVD, Normal thyroid  NERVOUS SYSTEM:  Alert & Oriented X3, Good concentration; Motor Strength 5/5 B/L upper and lower extremities; DTRs 2+ intact and symmetric  CHEST/LUNG: Clear to percussion bilaterally; No rales, rhonchi, wheezing, or rubs  HEART: Regular rate and rhythm; No murmurs, rubs, or gallops  ABDOMEN: Soft, Nontender, Nondistended; Bowel sounds present  EXTREMITIES:  2+ Peripheral Pulses, No clubbing, cyanosis, or edema  LYMPH: No lymphadenopathy noted  SKIN: No rashes or lesions    LABS:                        12.6   3.71  )-----------( 192      ( 12 Jul 2023 15:27 )             40.3     07-13    138  |  106  |  12  ----------------------------<  72  3.6   |  23  |  0.86    Ca    8.0<L>      13 Jul 2023 08:17    TPro  8.6<H>  /  Alb  4.1  /  TBili  0.8  /  DBili  x   /  AST  66<H>  /  ALT  42  /  AlkPhos  66  07-12      Urinalysis Basic - ( 13 Jul 2023 08:17 )    Color: x / Appearance: x / SG: x / pH: x  Gluc: 72 mg/dL / Ketone: x  / Bili: x / Urobili: x   Blood: x / Protein: x / Nitrite: x   Leuk Esterase: x / RBC: x / WBC x   Sq Epi: x / Non Sq Epi: x / Bacteria: x      CAPILLARY BLOOD GLUCOSE          RADIOLOGY & ADDITIONAL TESTS:    Imaging Personally Reviewed:  [ X] YES  [ ] NO    Consultant(s) Notes Reviewed:  [ X] YES  [ ] NO    Care Discussed with Consultants/Other Providers [X ] YES  [ ] NO

## 2023-07-14 LAB
ALBUMIN SERPL ELPH-MCNC: 3.4 G/DL — SIGNIFICANT CHANGE UP (ref 3.3–5)
ALP SERPL-CCNC: 55 U/L — SIGNIFICANT CHANGE UP (ref 40–120)
ALT FLD-CCNC: 34 U/L — SIGNIFICANT CHANGE UP (ref 12–78)
ANION GAP SERPL CALC-SCNC: 7 MMOL/L — SIGNIFICANT CHANGE UP (ref 5–17)
AST SERPL-CCNC: 53 U/L — HIGH (ref 15–37)
BILIRUB SERPL-MCNC: 1 MG/DL — SIGNIFICANT CHANGE UP (ref 0.2–1.2)
BUN SERPL-MCNC: 8 MG/DL — SIGNIFICANT CHANGE UP (ref 7–23)
CALCIUM SERPL-MCNC: 8.9 MG/DL — SIGNIFICANT CHANGE UP (ref 8.5–10.1)
CHLORIDE SERPL-SCNC: 108 MMOL/L — SIGNIFICANT CHANGE UP (ref 96–108)
CO2 SERPL-SCNC: 24 MMOL/L — SIGNIFICANT CHANGE UP (ref 22–31)
CREAT SERPL-MCNC: 0.94 MG/DL — SIGNIFICANT CHANGE UP (ref 0.5–1.3)
EGFR: 95 ML/MIN/1.73M2 — SIGNIFICANT CHANGE UP
GLUCOSE SERPL-MCNC: 92 MG/DL — SIGNIFICANT CHANGE UP (ref 70–99)
HCT VFR BLD CALC: 36.5 % — LOW (ref 39–50)
HGB BLD-MCNC: 11.3 G/DL — LOW (ref 13–17)
MCHC RBC-ENTMCNC: 23.1 PG — LOW (ref 27–34)
MCHC RBC-ENTMCNC: 31 G/DL — LOW (ref 32–36)
MCV RBC AUTO: 74.5 FL — LOW (ref 80–100)
NRBC # BLD: 0 /100 WBCS — SIGNIFICANT CHANGE UP (ref 0–0)
PLATELET # BLD AUTO: 126 K/UL — LOW (ref 150–400)
POTASSIUM SERPL-MCNC: 3.7 MMOL/L — SIGNIFICANT CHANGE UP (ref 3.5–5.3)
POTASSIUM SERPL-SCNC: 3.7 MMOL/L — SIGNIFICANT CHANGE UP (ref 3.5–5.3)
PROT SERPL-MCNC: 7.1 GM/DL — SIGNIFICANT CHANGE UP (ref 6–8.3)
RBC # BLD: 4.9 M/UL — SIGNIFICANT CHANGE UP (ref 4.2–5.8)
RBC # FLD: 21.6 % — HIGH (ref 10.3–14.5)
SODIUM SERPL-SCNC: 139 MMOL/L — SIGNIFICANT CHANGE UP (ref 135–145)
WBC # BLD: 3.19 K/UL — LOW (ref 3.8–10.5)
WBC # FLD AUTO: 3.19 K/UL — LOW (ref 3.8–10.5)

## 2023-07-14 PROCEDURE — 36410 VNPNXR 3YR/> PHY/QHP DX/THER: CPT | Mod: GC

## 2023-07-14 PROCEDURE — 99232 SBSQ HOSP IP/OBS MODERATE 35: CPT

## 2023-07-14 PROCEDURE — 76937 US GUIDE VASCULAR ACCESS: CPT | Mod: 26,GC

## 2023-07-14 RX ORDER — MORPHINE SULFATE 50 MG/1
2 CAPSULE, EXTENDED RELEASE ORAL ONCE
Refills: 0 | Status: DISCONTINUED | OUTPATIENT
Start: 2023-07-14 | End: 2023-07-14

## 2023-07-14 RX ORDER — HALOPERIDOL DECANOATE 100 MG/ML
5 INJECTION INTRAMUSCULAR ONCE
Refills: 0 | Status: COMPLETED | OUTPATIENT
Start: 2023-07-14 | End: 2023-07-14

## 2023-07-14 RX ADMIN — Medication 2 MILLIGRAM(S): at 09:03

## 2023-07-14 RX ADMIN — Medication 1.5 MILLIGRAM(S): at 05:51

## 2023-07-14 RX ADMIN — MORPHINE SULFATE 2 MILLIGRAM(S): 50 CAPSULE, EXTENDED RELEASE ORAL at 15:20

## 2023-07-14 RX ADMIN — Medication 2 MILLIGRAM(S): at 12:32

## 2023-07-14 RX ADMIN — MORPHINE SULFATE 2 MILLIGRAM(S): 50 CAPSULE, EXTENDED RELEASE ORAL at 15:40

## 2023-07-14 RX ADMIN — HALOPERIDOL DECANOATE 5 MILLIGRAM(S): 100 INJECTION INTRAMUSCULAR at 15:20

## 2023-07-14 RX ADMIN — Medication 1 TABLET(S): at 12:34

## 2023-07-14 RX ADMIN — Medication 2 MILLIGRAM(S): at 22:12

## 2023-07-14 RX ADMIN — SODIUM CHLORIDE 100 MILLILITER(S): 9 INJECTION, SOLUTION INTRAVENOUS at 20:27

## 2023-07-14 RX ADMIN — SODIUM CHLORIDE 100 MILLILITER(S): 9 INJECTION, SOLUTION INTRAVENOUS at 05:53

## 2023-07-14 RX ADMIN — Medication 25 MILLIGRAM(S): at 17:28

## 2023-07-14 RX ADMIN — Medication 1.5 MILLIGRAM(S): at 01:27

## 2023-07-14 NOTE — DIETITIAN INITIAL EVALUATION ADULT - PERTINENT LABORATORY DATA
07-14    139  |  108  |  8   ----------------------------<  92  3.7   |  24  |  0.94    Ca    8.9      14 Jul 2023 07:20    TPro  7.1  /  Alb  3.4  /  TBili  1.0  /  DBili  x   /  AST  53<H>  /  ALT  34  /  AlkPhos  55  07-14  A1C with Estimated Average Glucose Result: 5.1 % (11-16-22 @ 05:30)

## 2023-07-14 NOTE — PROGRESS NOTE ADULT - ASSESSMENT
59 y/o male PMH of alcohol withdrawals, SI presents with abdominal pain for past 1 day.  Endorsing suicidal ideation because "he drinks too much." endorsing last drink at 3 or 4 AM today, endorsing drinking alcohol everyday.  Asking for medication to kill him.  Notes epigastric abdominal pain, nausea and multiple episodes of emesis.  Denies falls.  Denies chest pain/sob, fever/chills.       alcoholism    place patient on withdrawal protocol and monitor and treatment based on the protocol  CECILIA 7 wicho RN in am round    - Ativan/Librium based on score of withdrawal   - monitor BMP and LFTs   - MVI/folate/Thiamine   - psychiatry consult  as outpatient  will eval for depression  Because his compliance with treatments may be impacted by depression         Plan:  Admit for ETOH withdrawal. CIWA score initially was 21 then 13 then 6. Responded well to IVP Ativan in ER. Lipase normal   at 235, ETOH level high at 276.  Lactate high at 6.9, T.bili normal, mild increase in LFT. Normal CT of abdomen 7/05/23, will not repeat now.   Will hydrate with elevated lactate, repeat in AM.        CIWA protocol with IV Ativan 2 mg every 4 hours, consider changing to PRN Ativan in AM if CIWA score allows.

## 2023-07-14 NOTE — DIETITIAN INITIAL EVALUATION ADULT - OTHER INFO
Pt with PMHx of ETOH abuse, ETOH withdrawals, DTs, gastritis, mixed hyperlipidemia, PUD, substance abuse & SI; admitted for ETOH withdrawal.  Pt appears anxious, expressed desire to go home. Reports mostly good appetite and good PO intake PTA; no dietary restrictions PTA. Consuming mostly 26-50% of meals during LOS; denies any chew/swallowing difficulty; denies any current N/V/D/C. No known food allergies.  Per chart review, pt weighed 182 lbs/82.6 kg in 10/2022 & current wt 184 lbs./83.4 kg(07/14/2023); wt stable. Pt with PMHx of ETOH abuse, ETOH withdrawals, DTs, gastritis, mixed hyperlipidemia, PUD, substance abuse & SI.  Pt presents with abdominal pain and endorses SI because of ETOH abuse; admitted for ETOH withdrawal.  Pt appears anxious, expressed desire to go home. Reports mostly good appetite and good PO intake PTA; no dietary restrictions PTA. Consuming mostly 26-50% of meals during LOS; denies any chew/swallowing difficulty; denies any current N/V/D/C. No known food allergies.  Per chart review, pt weighed 182 lbs/82.6 kg in 10/2022 & current wt 184 lbs./83.4 kg(07/14/2023); wt stable.

## 2023-07-14 NOTE — DIETITIAN INITIAL EVALUATION ADULT - PERTINENT MEDS FT
MEDICATIONS  (STANDING):  LORazepam   Injectable 1.5 milliGRAM(s) IV Push every 4 hours  LORazepam   Injectable 1 milliGRAM(s) IV Push every 4 hours  LORazepam   Injectable   IV Push   multivitamin 1 Tablet(s) Oral daily  pantoprazole  Injectable 40 milliGRAM(s) IV Push daily  sodium chloride 0.45%. 1000 milliLiter(s) (100 mL/Hr) IV Continuous <Continuous>  thiamine Injectable 100 milliGRAM(s) IV Push daily    MEDICATIONS  (PRN):  LORazepam   Injectable 2 milliGRAM(s) IntraMuscular every 1 hour PRN Symptom-triggered: each CIWA -Ar score 8 or GREATER  LORazepam   Injectable 2 milliGRAM(s) IntraMuscular once PRN no IV Access  morphine  - Injectable 2 milliGRAM(s) IV Push every 4 hours PRN Mild Pain (1 - 3)  simethicone 80 milliGRAM(s) Chew every 6 hours PRN Gas

## 2023-07-14 NOTE — CHART NOTE - NSCHARTNOTEFT_GEN_A_CORE
Patient seen and examined for midline placement.   Right upper arm vessels examined under ultrasound. Basilic vein too small for midline, brachial vein positioned under artery.   Left arm examined. Both brachial and basilic veins attempted, blood return noted on each stick, but unable to thread wire.   Decision made to abort procedure secondary to patient complaining of discomfort.     RN made aware, unable to reach medicine PA at time of procedure.

## 2023-07-14 NOTE — PROGRESS NOTE ADULT - SUBJECTIVE AND OBJECTIVE BOX
Patient is a 58y old  Male who presents with a chief complaint of ALCOHOL DEPENDENCE WITH WITHDRAWAL     (14 Jul 2023 09:56)      INTERVAL HPI/OVERNIGHT EVENTS:  mild agitated and confuse  dw RN score 7 in the morning   denies c palpitation and sob     MEDICATIONS  (STANDING):  LORazepam   Injectable 1.5 milliGRAM(s) IV Push every 4 hours  LORazepam   Injectable 1 milliGRAM(s) IV Push every 4 hours  LORazepam   Injectable   IV Push   multivitamin 1 Tablet(s) Oral daily  pantoprazole  Injectable 40 milliGRAM(s) IV Push daily  sodium chloride 0.45%. 1000 milliLiter(s) (100 mL/Hr) IV Continuous <Continuous>  thiamine Injectable 100 milliGRAM(s) IV Push daily    MEDICATIONS  (PRN):  LORazepam   Injectable 2 milliGRAM(s) IntraMuscular every 1 hour PRN Symptom-triggered: each CIWA -Ar score 8 or GREATER  LORazepam   Injectable 2 milliGRAM(s) IntraMuscular once PRN no IV Access  morphine  - Injectable 2 milliGRAM(s) IV Push every 4 hours PRN Mild Pain (1 - 3)  simethicone 80 milliGRAM(s) Chew every 6 hours PRN Gas      Allergies    No Known Allergies    Intolerances        REVIEW OF SYSTEMS:  CONSTITUTIONAL: No fever, weight loss, or fatigue  EYES: No eye pain, visual disturbances, or discharge  ENMT:  No difficulty hearing, tinnitus, vertigo; No sinus or throat pain  NECK: No pain or stiffness  BREASTS: No pain, masses, or nipple discharge  RESPIRATORY: No cough, wheezing, chills or hemoptysis; No shortness of breath  CARDIOVASCULAR: No chest pain, palpitations, dizziness, or leg swelling  GASTROINTESTINAL: No abdominal or epigastric pain. No nausea, vomiting, or hematemesis; No diarrhea or constipation. No melena or hematochezia.  GENITOURINARY: No dysuria, frequency, hematuria, or incontinence  NEUROLOGICAL: No headaches, memory loss, loss of strength, numbness, or tremors  SKIN: No itching, burning, rashes, or lesions   LYMPH NODES: No enlarged glands  ENDOCRINE: No heat or cold intolerance; No hair loss  MUSCULOSKELETAL: No joint pain or swelling; No muscle, back, or extremity pain  PSYCHIATRIC: No depression,  positive for  anxiety, mood swings, or difficulty sleeping  HEME/LYMPH: No easy bruising, or bleeding gums  ALLERGY AND IMMUNOLOGIC: No hives or eczema    Vital Signs Last 24 Hrs  T(C): 36.4 (14 Jul 2023 10:52), Max: 36.6 (13 Jul 2023 15:57)  T(F): 97.6 (14 Jul 2023 10:52), Max: 97.9 (13 Jul 2023 15:57)  HR: 68 (14 Jul 2023 10:52) (64 - 72)  BP: 127/84 (14 Jul 2023 10:52) (127/84 - 147/84)  BP(mean): --  RR: 16 (14 Jul 2023 10:52) (16 - 18)  SpO2: 98% (14 Jul 2023 10:52) (97% - 100%)        PHYSICAL EXAM:  GENERAL: NAD, well-groomed, well-developed  HEAD:  Atraumatic, Normocephalic  EYES: EOMI, PERRLA, conjunctiva and sclera clear  ENMT: No tonsillar erythema, exudates, or enlargement; Moist mucous membranes, Good dentition, No lesions  NECK: Supple, No JVD, Normal thyroid  NERVOUS SYSTEM:  Alert & Oriented X3, Good concentration; Motor Strength 5/5 B/L upper and lower extremities; DTRs 2+ intact and symmetric  CHEST/LUNG: Clear to percussion bilaterally; No rales, rhonchi, wheezing, or rubs  HEART: Regular rate and rhythm; No murmurs, rubs, or gallops  ABDOMEN: Soft, Nontender, Nondistended; Bowel sounds present  EXTREMITIES:  2+ Peripheral Pulses, No clubbing, cyanosis, or edema  LYMPH: No lymphadenopathy noted  SKIN: No rashes or lesions    LABS:                        11.3   3.19  )-----------( 126      ( 14 Jul 2023 07:20 )             36.5     07-14    139  |  108  |  8   ----------------------------<  92  3.7   |  24  |  0.94    Ca    8.9      14 Jul 2023 07:20    TPro  7.1  /  Alb  3.4  /  TBili  1.0  /  DBili  x   /  AST  53<H>  /  ALT  34  /  AlkPhos  55  07-14      Urinalysis Basic - ( 14 Jul 2023 07:20 )    Color: x / Appearance: x / SG: x / pH: x  Gluc: 92 mg/dL / Ketone: x  / Bili: x / Urobili: x   Blood: x / Protein: x / Nitrite: x   Leuk Esterase: x / RBC: x / WBC x   Sq Epi: x / Non Sq Epi: x / Bacteria: x      CAPILLARY BLOOD GLUCOSE          RADIOLOGY & ADDITIONAL TESTS:    Imaging Personally Reviewed:  [ X] YES  [ ] NO    Consultant(s) Notes Reviewed:  [ X] YES  [ ] NO    Care Discussed with Consultants/Other Providers [X ] YES  [ ] NO

## 2023-07-15 LAB
ALBUMIN SERPL ELPH-MCNC: 3.3 G/DL — SIGNIFICANT CHANGE UP (ref 3.3–5)
ALP SERPL-CCNC: 55 U/L — SIGNIFICANT CHANGE UP (ref 40–120)
ALT FLD-CCNC: 36 U/L — SIGNIFICANT CHANGE UP (ref 12–78)
ANION GAP SERPL CALC-SCNC: 5 MMOL/L — SIGNIFICANT CHANGE UP (ref 5–17)
AST SERPL-CCNC: 55 U/L — HIGH (ref 15–37)
BILIRUB SERPL-MCNC: 0.8 MG/DL — SIGNIFICANT CHANGE UP (ref 0.2–1.2)
BUN SERPL-MCNC: 8 MG/DL — SIGNIFICANT CHANGE UP (ref 7–23)
CALCIUM SERPL-MCNC: 8.7 MG/DL — SIGNIFICANT CHANGE UP (ref 8.5–10.1)
CHLORIDE SERPL-SCNC: 110 MMOL/L — HIGH (ref 96–108)
CO2 SERPL-SCNC: 22 MMOL/L — SIGNIFICANT CHANGE UP (ref 22–31)
CREAT SERPL-MCNC: 0.87 MG/DL — SIGNIFICANT CHANGE UP (ref 0.5–1.3)
EGFR: 101 ML/MIN/1.73M2 — SIGNIFICANT CHANGE UP
GLUCOSE SERPL-MCNC: 100 MG/DL — HIGH (ref 70–99)
HCT VFR BLD CALC: 37.7 % — LOW (ref 39–50)
HGB BLD-MCNC: 11.4 G/DL — LOW (ref 13–17)
MCHC RBC-ENTMCNC: 22.9 PG — LOW (ref 27–34)
MCHC RBC-ENTMCNC: 30.2 G/DL — LOW (ref 32–36)
MCV RBC AUTO: 75.7 FL — LOW (ref 80–100)
NRBC # BLD: 0 /100 WBCS — SIGNIFICANT CHANGE UP (ref 0–0)
PLATELET # BLD AUTO: 108 K/UL — LOW (ref 150–400)
POTASSIUM SERPL-MCNC: 4.4 MMOL/L — SIGNIFICANT CHANGE UP (ref 3.5–5.3)
POTASSIUM SERPL-SCNC: 4.4 MMOL/L — SIGNIFICANT CHANGE UP (ref 3.5–5.3)
PROT SERPL-MCNC: 7.3 GM/DL — SIGNIFICANT CHANGE UP (ref 6–8.3)
RBC # BLD: 4.98 M/UL — SIGNIFICANT CHANGE UP (ref 4.2–5.8)
RBC # FLD: 22.3 % — HIGH (ref 10.3–14.5)
SODIUM SERPL-SCNC: 137 MMOL/L — SIGNIFICANT CHANGE UP (ref 135–145)
WBC # BLD: 3.21 K/UL — LOW (ref 3.8–10.5)
WBC # FLD AUTO: 3.21 K/UL — LOW (ref 3.8–10.5)

## 2023-07-15 PROCEDURE — 99232 SBSQ HOSP IP/OBS MODERATE 35: CPT

## 2023-07-15 RX ADMIN — Medication 2 MILLIGRAM(S): at 13:15

## 2023-07-15 RX ADMIN — SODIUM CHLORIDE 100 MILLILITER(S): 9 INJECTION, SOLUTION INTRAVENOUS at 05:52

## 2023-07-15 RX ADMIN — Medication 1 TABLET(S): at 11:27

## 2023-07-15 RX ADMIN — PANTOPRAZOLE SODIUM 40 MILLIGRAM(S): 20 TABLET, DELAYED RELEASE ORAL at 11:26

## 2023-07-15 RX ADMIN — Medication 1.5 MILLIGRAM(S): at 18:26

## 2023-07-15 RX ADMIN — Medication 100 MILLIGRAM(S): at 11:26

## 2023-07-15 RX ADMIN — SODIUM CHLORIDE 100 MILLILITER(S): 9 INJECTION, SOLUTION INTRAVENOUS at 07:31

## 2023-07-15 RX ADMIN — Medication 2 MILLIGRAM(S): at 05:53

## 2023-07-15 RX ADMIN — Medication 2 MILLIGRAM(S): at 09:15

## 2023-07-15 RX ADMIN — Medication 2 MILLIGRAM(S): at 02:44

## 2023-07-15 RX ADMIN — SODIUM CHLORIDE 100 MILLILITER(S): 9 INJECTION, SOLUTION INTRAVENOUS at 15:23

## 2023-07-15 RX ADMIN — Medication 1.5 MILLIGRAM(S): at 22:12

## 2023-07-15 RX ADMIN — Medication 2 MILLIGRAM(S): at 15:49

## 2023-07-15 NOTE — PROGRESS NOTE ADULT - ASSESSMENT
57 y/o male PMH of alcohol withdrawals, SI presents with abdominal pain for past 1 day.  Endorsing suicidal ideation because "he drinks too much." endorsing last drink at 3 or 4 AM today, endorsing drinking alcohol everyday.  Asking for medication to kill him.  Notes epigastric abdominal pain, nausea and multiple episodes of emesis.  Denies falls.  Denies chest pain/sob, fever/chills.       alcoholism  score 5    place patient on withdrawal protocol and monitor and treatment based on the protocol  CECILIA 7 wicho RN in am round    - Ativan/Librium based on score of withdrawal  difficult  in iv access and was able to get it    - monitor BMP and LFTs   - MVI/folate/Thiamine   - psychiatry consult  as outpatient  will eval for depression  Because his compliance with treatments may be impacted by depression            TIKAWA protocol with IV Ativan 2 mg every 4 hours, consider changing to PRN Ativan in AM if CIWA score allows.        long discussion with patient and family including son  counselling and advise   advise [facility] program vs inpatient after discussing with his pcp as outpatient

## 2023-07-15 NOTE — PROGRESS NOTE ADULT - SUBJECTIVE AND OBJECTIVE BOX
Patient is a 58y old  Male who presents with a chief complaint of ETOH withdrawal. (14 Jul 2023 11:45)      INTERVAL HPI/OVERNIGHT EVENTS:    MEDICATIONS  (STANDING):  LORazepam   Injectable 2 milliGRAM(s) IV Push every 4 hours  LORazepam   Injectable 1.5 milliGRAM(s) IV Push every 4 hours  LORazepam   Injectable   IV Push   multivitamin 1 Tablet(s) Oral daily  pantoprazole  Injectable 40 milliGRAM(s) IV Push daily  sodium chloride 0.45%. 1000 milliLiter(s) (100 mL/Hr) IV Continuous <Continuous>  thiamine Injectable 100 milliGRAM(s) IV Push daily    MEDICATIONS  (PRN):  LORazepam   Injectable 2 milliGRAM(s) IntraMuscular every 1 hour PRN Symptom-triggered: each CIWA -Ar score 8 or GREATER  morphine  - Injectable 2 milliGRAM(s) IV Push every 4 hours PRN Mild Pain (1 - 3)  simethicone 80 milliGRAM(s) Chew every 6 hours PRN Gas      Allergies    No Known Allergies    Intolerances        REVIEW OF SYSTEMS:  CONSTITUTIONAL: No fever, weight loss, or fatigue  EYES: No eye pain, visual disturbances, or discharge  ENMT:  No difficulty hearing, tinnitus, vertigo; No sinus or throat pain  NECK: No pain or stiffness  BREASTS: No pain, masses, or nipple discharge  RESPIRATORY: No cough, wheezing, chills or hemoptysis; No shortness of breath  CARDIOVASCULAR: No chest pain, palpitations, dizziness, or leg swelling  GASTROINTESTINAL: No abdominal or epigastric pain. No nausea, vomiting, or hematemesis; No diarrhea or constipation. No melena or hematochezia.  GENITOURINARY: No dysuria, frequency, hematuria, or incontinence  NEUROLOGICAL: No headaches, memory loss, loss of strength, numbness, or tremors  SKIN: No itching, burning, rashes, or lesions   LYMPH NODES: No enlarged glands  ENDOCRINE: No heat or cold intolerance; No hair loss  MUSCULOSKELETAL: No joint pain or swelling; No muscle, back, or extremity pain  PSYCHIATRIC: No depression, anxiety, mood swings, or difficulty sleeping  HEME/LYMPH: No easy bruising, or bleeding gums  ALLERGY AND IMMUNOLOGIC: No hives or eczema    Vital Signs Last 24 Hrs  T(C): 36.2 (15 Jul 2023 04:53), Max: 36.6 (14 Jul 2023 23:54)  T(F): 97.2 (15 Jul 2023 04:53), Max: 97.9 (14 Jul 2023 23:54)  HR: 74 (15 Jul 2023 04:53) (68 - 118)  BP: 132/87 (15 Jul 2023 04:53) (122/81 - 143/98)  BP(mean): --  RR: 18 (15 Jul 2023 04:53) (16 - 20)  SpO2: 98% (15 Jul 2023 04:53) (96% - 98%)    Parameters below as of 15 Jul 2023 04:53  Patient On (Oxygen Delivery Method): room air        PHYSICAL EXAM:  GENERAL: NAD, well-groomed, well-developed  HEAD:  Atraumatic, Normocephalic  EYES: EOMI, PERRLA, conjunctiva and sclera clear  ENMT: No tonsillar erythema, exudates, or enlargement; Moist mucous membranes, Good dentition, No lesions  NECK: Supple, No JVD, Normal thyroid  NERVOUS SYSTEM:  Alert & Oriented X3, Good concentration; Motor Strength 5/5 B/L upper and lower extremities; DTRs 2+ intact and symmetric  CHEST/LUNG: Clear to percussion bilaterally; No rales, rhonchi, wheezing, or rubs  HEART: Regular rate and rhythm; No murmurs, rubs, or gallops  ABDOMEN: Soft, Nontender, Nondistended; Bowel sounds present  EXTREMITIES:  2+ Peripheral Pulses, No clubbing, cyanosis, or edema  LYMPH: No lymphadenopathy noted  SKIN: No rashes or lesions    LABS:                        11.4   3.21  )-----------( 108      ( 15 Jul 2023 07:00 )             37.7     07-15    137  |  110<H>  |  8   ----------------------------<  100<H>  4.4   |  22  |  0.87    Ca    8.7      15 Jul 2023 07:00    TPro  7.3  /  Alb  3.3  /  TBili  0.8  /  DBili  x   /  AST  55<H>  /  ALT  36  /  AlkPhos  55  07-15      Urinalysis Basic - ( 15 Jul 2023 07:00 )    Color: x / Appearance: x / SG: x / pH: x  Gluc: 100 mg/dL / Ketone: x  / Bili: x / Urobili: x   Blood: x / Protein: x / Nitrite: x   Leuk Esterase: x / RBC: x / WBC x   Sq Epi: x / Non Sq Epi: x / Bacteria: x      CAPILLARY BLOOD GLUCOSE          RADIOLOGY & ADDITIONAL TESTS:    Imaging Personally Reviewed:  [ X] YES  [ ] NO    Consultant(s) Notes Reviewed:  [ X] YES  [ ] NO    Care Discussed with Consultants/Other Providers [X ] YES  [ ] NO Patient is a 58y old  Male who presents with a chief complaint of ETOH withdrawal. (14 Jul 2023 11:45)      INTERVAL HPI/OVERNIGHT EVENTS:  anxious and nervous last CIWA 5     MEDICATIONS  (STANDING):  LORazepam   Injectable 2 milliGRAM(s) IV Push every 4 hours  LORazepam   Injectable 1.5 milliGRAM(s) IV Push every 4 hours  LORazepam   Injectable   IV Push   multivitamin 1 Tablet(s) Oral daily  pantoprazole  Injectable 40 milliGRAM(s) IV Push daily  sodium chloride 0.45%. 1000 milliLiter(s) (100 mL/Hr) IV Continuous <Continuous>  thiamine Injectable 100 milliGRAM(s) IV Push daily    MEDICATIONS  (PRN):  LORazepam   Injectable 2 milliGRAM(s) IntraMuscular every 1 hour PRN Symptom-triggered: each CIWA -Ar score 8 or GREATER  morphine  - Injectable 2 milliGRAM(s) IV Push every 4 hours PRN Mild Pain (1 - 3)  simethicone 80 milliGRAM(s) Chew every 6 hours PRN Gas      Allergies    No Known Allergies    Intolerances        REVIEW OF SYSTEMS:  CONSTITUTIONAL: No fever, weight loss, or fatigue  EYES: No eye pain, visual disturbances, or discharge  ENMT:  No difficulty hearing, tinnitus, vertigo; No sinus or throat pain  NECK: No pain or stiffness  BREASTS: No pain, masses, or nipple discharge  RESPIRATORY: No cough, wheezing, chills or hemoptysis; No shortness of breath  CARDIOVASCULAR: No chest pain, palpitations, dizziness, or leg swelling  GASTROINTESTINAL: No abdominal or epigastric pain. No nausea, vomiting, or hematemesis; No diarrhea or constipation. No melena or hematochezia.  GENITOURINARY: No dysuria, frequency, hematuria, or incontinence  NEUROLOGICAL: No headaches, memory loss, loss of strength, numbness, or tremors  SKIN: No itching, burning, rashes, or lesions   LYMPH NODES: No enlarged glands  ENDOCRINE: No heat or cold intolerance; No hair loss  MUSCULOSKELETAL: No joint pain or swelling; No muscle, back, or extremity pain  PSYCHIATRIC: No depression, anxiety, mood swings, or difficulty sleeping  HEME/LYMPH: No easy bruising, or bleeding gums  ALLERGY AND IMMUNOLOGIC: No hives or eczema    Vital Signs Last 24 Hrs  T(C): 36.2 (15 Jul 2023 04:53), Max: 36.6 (14 Jul 2023 23:54)  T(F): 97.2 (15 Jul 2023 04:53), Max: 97.9 (14 Jul 2023 23:54)  HR: 74 (15 Jul 2023 04:53) (68 - 118)  BP: 132/87 (15 Jul 2023 04:53) (122/81 - 143/98)  BP(mean): --  RR: 18 (15 Jul 2023 04:53) (16 - 20)  SpO2: 98% (15 Jul 2023 04:53) (96% - 98%)    Parameters below as of 15 Jul 2023 04:53  Patient On (Oxygen Delivery Method): room air        PHYSICAL EXAM:  GENERAL: NAD, well-groomed, well-developed  HEAD:  Atraumatic, Normocephalic  EYES: EOMI, PERRLA, conjunctiva and sclera clear  ENMT: No tonsillar erythema, exudates, or enlargement; Moist mucous membranes, Good dentition, No lesions  NECK: Supple, No JVD, Normal thyroid  NERVOUS SYSTEM:  Alert & Oriented X3, Good concentration; Motor Strength 5/5 B/L upper and lower extremities; DTRs 2+ intact and symmetric  CHEST/LUNG: Clear to percussion bilaterally; No rales, rhonchi, wheezing, or rubs  HEART: Regular rate and rhythm; No murmurs, rubs, or gallops  ABDOMEN: Soft, Nontender, Nondistended; Bowel sounds present  EXTREMITIES:  2+ Peripheral Pulses, No clubbing, cyanosis, or edema  LYMPH: No lymphadenopathy noted  SKIN: No rashes or lesions    LABS:                        11.4   3.21  )-----------( 108      ( 15 Jul 2023 07:00 )             37.7     07-15    137  |  110<H>  |  8   ----------------------------<  100<H>  4.4   |  22  |  0.87    Ca    8.7      15 Jul 2023 07:00    TPro  7.3  /  Alb  3.3  /  TBili  0.8  /  DBili  x   /  AST  55<H>  /  ALT  36  /  AlkPhos  55  07-15      Urinalysis Basic - ( 15 Jul 2023 07:00 )    Color: x / Appearance: x / SG: x / pH: x  Gluc: 100 mg/dL / Ketone: x  / Bili: x / Urobili: x   Blood: x / Protein: x / Nitrite: x   Leuk Esterase: x / RBC: x / WBC x   Sq Epi: x / Non Sq Epi: x / Bacteria: x      CAPILLARY BLOOD GLUCOSE          RADIOLOGY & ADDITIONAL TESTS:    Imaging Personally Reviewed:  [ X] YES  [ ] NO    Consultant(s) Notes Reviewed:  [ X] YES  [ ] NO    Care Discussed with Consultants/Other Providers [X ] YES  [ ] NO

## 2023-07-16 LAB
ALBUMIN SERPL ELPH-MCNC: 3.5 G/DL — SIGNIFICANT CHANGE UP (ref 3.3–5)
ALP SERPL-CCNC: 56 U/L — SIGNIFICANT CHANGE UP (ref 40–120)
ALT FLD-CCNC: 43 U/L — SIGNIFICANT CHANGE UP (ref 12–78)
ANION GAP SERPL CALC-SCNC: 4 MMOL/L — LOW (ref 5–17)
AST SERPL-CCNC: 48 U/L — HIGH (ref 15–37)
BILIRUB SERPL-MCNC: 0.5 MG/DL — SIGNIFICANT CHANGE UP (ref 0.2–1.2)
BUN SERPL-MCNC: 8 MG/DL — SIGNIFICANT CHANGE UP (ref 7–23)
CALCIUM SERPL-MCNC: 9.5 MG/DL — SIGNIFICANT CHANGE UP (ref 8.5–10.1)
CHLORIDE SERPL-SCNC: 112 MMOL/L — HIGH (ref 96–108)
CO2 SERPL-SCNC: 23 MMOL/L — SIGNIFICANT CHANGE UP (ref 22–31)
CREAT SERPL-MCNC: 0.98 MG/DL — SIGNIFICANT CHANGE UP (ref 0.5–1.3)
EGFR: 90 ML/MIN/1.73M2 — SIGNIFICANT CHANGE UP
GLUCOSE SERPL-MCNC: 110 MG/DL — HIGH (ref 70–99)
HCT VFR BLD CALC: 35.2 % — LOW (ref 39–50)
HGB BLD-MCNC: 11.3 G/DL — LOW (ref 13–17)
MCHC RBC-ENTMCNC: 23.9 PG — LOW (ref 27–34)
MCHC RBC-ENTMCNC: 32.1 G/DL — SIGNIFICANT CHANGE UP (ref 32–36)
MCV RBC AUTO: 74.6 FL — LOW (ref 80–100)
NRBC # BLD: 0 /100 WBCS — SIGNIFICANT CHANGE UP (ref 0–0)
PLATELET # BLD AUTO: 143 K/UL — LOW (ref 150–400)
POTASSIUM SERPL-MCNC: 3.4 MMOL/L — LOW (ref 3.5–5.3)
POTASSIUM SERPL-SCNC: 3.4 MMOL/L — LOW (ref 3.5–5.3)
PROT SERPL-MCNC: 7.6 GM/DL — SIGNIFICANT CHANGE UP (ref 6–8.3)
RBC # BLD: 4.72 M/UL — SIGNIFICANT CHANGE UP (ref 4.2–5.8)
RBC # FLD: 22.9 % — HIGH (ref 10.3–14.5)
SODIUM SERPL-SCNC: 139 MMOL/L — SIGNIFICANT CHANGE UP (ref 135–145)
WBC # BLD: 3.25 K/UL — LOW (ref 3.8–10.5)
WBC # FLD AUTO: 3.25 K/UL — LOW (ref 3.8–10.5)

## 2023-07-16 PROCEDURE — 99232 SBSQ HOSP IP/OBS MODERATE 35: CPT

## 2023-07-16 RX ORDER — POTASSIUM CHLORIDE 20 MEQ
40 PACKET (EA) ORAL ONCE
Refills: 0 | Status: COMPLETED | OUTPATIENT
Start: 2023-07-16 | End: 2023-07-16

## 2023-07-16 RX ORDER — HEPARIN SODIUM 5000 [USP'U]/ML
5000 INJECTION INTRAVENOUS; SUBCUTANEOUS EVERY 12 HOURS
Refills: 0 | Status: DISCONTINUED | OUTPATIENT
Start: 2023-07-16 | End: 2023-07-16

## 2023-07-16 RX ADMIN — Medication 1.5 MILLIGRAM(S): at 02:12

## 2023-07-16 RX ADMIN — Medication 1.5 MILLIGRAM(S): at 05:39

## 2023-07-16 RX ADMIN — Medication 1 MILLIGRAM(S): at 21:59

## 2023-07-16 RX ADMIN — PANTOPRAZOLE SODIUM 40 MILLIGRAM(S): 20 TABLET, DELAYED RELEASE ORAL at 12:15

## 2023-07-16 RX ADMIN — SODIUM CHLORIDE 100 MILLILITER(S): 9 INJECTION, SOLUTION INTRAVENOUS at 22:01

## 2023-07-16 RX ADMIN — Medication 1 TABLET(S): at 12:15

## 2023-07-16 RX ADMIN — Medication 1 MILLIGRAM(S): at 17:17

## 2023-07-16 RX ADMIN — Medication 2 MILLIGRAM(S): at 23:35

## 2023-07-16 RX ADMIN — SODIUM CHLORIDE 100 MILLILITER(S): 9 INJECTION, SOLUTION INTRAVENOUS at 02:14

## 2023-07-16 RX ADMIN — Medication 1.5 MILLIGRAM(S): at 10:49

## 2023-07-16 RX ADMIN — SIMETHICONE 80 MILLIGRAM(S): 80 TABLET, CHEWABLE ORAL at 02:28

## 2023-07-16 RX ADMIN — Medication 1.5 MILLIGRAM(S): at 14:21

## 2023-07-16 RX ADMIN — Medication 100 MILLIGRAM(S): at 12:14

## 2023-07-16 RX ADMIN — Medication 40 MILLIEQUIVALENT(S): at 17:17

## 2023-07-16 RX ADMIN — SODIUM CHLORIDE 100 MILLILITER(S): 9 INJECTION, SOLUTION INTRAVENOUS at 12:33

## 2023-07-16 NOTE — PROGRESS NOTE ADULT - SUBJECTIVE AND OBJECTIVE BOX
Patient is a 58y old  Male who presents with a chief complaint of ETOH withdrawal. (15 Jul 2023 10:06)      INTERVAL HPI/OVERNIGHT EVENTS:  mild anxious   denies cp palpitation and sob     MEDICATIONS  (STANDING):  LORazepam   Injectable 1.5 milliGRAM(s) IV Push every 4 hours  LORazepam   Injectable   IV Push   LORazepam   Injectable 1 milliGRAM(s) IV Push every 4 hours  multivitamin 1 Tablet(s) Oral daily  pantoprazole  Injectable 40 milliGRAM(s) IV Push daily  sodium chloride 0.45%. 1000 milliLiter(s) (100 mL/Hr) IV Continuous <Continuous>  thiamine Injectable 100 milliGRAM(s) IV Push daily    MEDICATIONS  (PRN):  LORazepam   Injectable 2 milliGRAM(s) IntraMuscular every 1 hour PRN Symptom-triggered: each CIWA -Ar score 8 or GREATER  morphine  - Injectable 2 milliGRAM(s) IV Push every 4 hours PRN Mild Pain (1 - 3)  simethicone 80 milliGRAM(s) Chew every 6 hours PRN Gas      Allergies    No Known Allergies    Intolerances        REVIEW OF SYSTEMS:  CONSTITUTIONAL: No fever, weight loss, or fatigue  EYES: No eye pain, visual disturbances, or discharge  ENMT:  No difficulty hearing, tinnitus, vertigo; No sinus or throat pain  NECK: No pain or stiffness  BREASTS: No pain, masses, or nipple discharge  RESPIRATORY: No cough, wheezing, chills or hemoptysis; No shortness of breath  CARDIOVASCULAR: No chest pain, palpitations, dizziness, or leg swelling  GASTROINTESTINAL: No abdominal or epigastric pain. No nausea, vomiting, or hematemesis; No diarrhea or constipation. No melena or hematochezia.  GENITOURINARY: No dysuria, frequency, hematuria, or incontinence  NEUROLOGICAL: No headaches, memory loss, loss of strength, numbness, or tremors  SKIN: No itching, burning, rashes, or lesions   LYMPH NODES: No enlarged glands  ENDOCRINE: No heat or cold intolerance; No hair loss  MUSCULOSKELETAL: No joint pain or swelling; No muscle, back, or extremity pain  PSYCHIATRIC: No depression, anxiety, mood swings, or difficulty sleeping  HEME/LYMPH: No easy bruising, or bleeding gums  ALLERGY AND IMMUNOLOGIC: No hives or eczema    Vital Signs Last 24 Hrs  T(C): 36.4 (16 Jul 2023 05:03), Max: 36.7 (15 Jul 2023 23:22)  T(F): 97.6 (16 Jul 2023 05:03), Max: 98.1 (15 Jul 2023 23:22)  HR: 67 (16 Jul 2023 05:03) (67 - 82)  BP: 144/83 (16 Jul 2023 05:03) (125/84 - 144/83)  BP(mean): --  RR: 18 (16 Jul 2023 05:03) (16 - 18)  SpO2: 97% (16 Jul 2023 05:03) (97% - 99%)        PHYSICAL EXAM:  GENERAL: NAD, well-groomed, well-developed  HEAD:  Atraumatic, Normocephalic  EYES: EOMI, PERRLA, conjunctiva and sclera clear  ENMT: No tonsillar erythema, exudates, or enlargement; Moist mucous membranes, Good dentition, No lesions  NECK: Supple, No JVD, Normal thyroid  NERVOUS SYSTEM:  Alert & Oriented X3, Good concentration; Motor Strength 5/5 B/L upper and lower extremities; DTRs 2+ intact and symmetric  CHEST/LUNG: Clear to percussion bilaterally; No rales, rhonchi, wheezing, or rubs  HEART: Regular rate and rhythm; No murmurs, rubs, or gallops  ABDOMEN: Soft, Nontender, Nondistended; Bowel sounds present  EXTREMITIES:  2+ Peripheral Pulses, No clubbing, cyanosis, or edema  LYMPH: No lymphadenopathy noted  SKIN: No rashes or lesions    LABS:                        11.4   3.21  )-----------( 108      ( 15 Jul 2023 07:00 )             37.7     07-15    137  |  110<H>  |  8   ----------------------------<  100<H>  4.4   |  22  |  0.87    Ca    8.7      15 Jul 2023 07:00    TPro  7.3  /  Alb  3.3  /  TBili  0.8  /  DBili  x   /  AST  55<H>  /  ALT  36  /  AlkPhos  55  07-15      Urinalysis Basic - ( 15 Jul 2023 07:00 )    Color: x / Appearance: x / SG: x / pH: x  Gluc: 100 mg/dL / Ketone: x  / Bili: x / Urobili: x   Blood: x / Protein: x / Nitrite: x   Leuk Esterase: x / RBC: x / WBC x   Sq Epi: x / Non Sq Epi: x / Bacteria: x      CAPILLARY BLOOD GLUCOSE          RADIOLOGY & ADDITIONAL TESTS:    Imaging Personally Reviewed:  [ X] YES  [ ] NO    Consultant(s) Notes Reviewed:  [ X] YES  [ ] NO    Care Discussed with Consultants/Other Providers [X ] YES  [ ] NO

## 2023-07-16 NOTE — PROGRESS NOTE ADULT - ASSESSMENT
57 y/o male PMH of alcohol withdrawals, SI presents with abdominal pain for past 1 day.  Endorsing suicidal ideation because "he drinks too much." endorsing last drink at 3 or 4 AM today, endorsing drinking alcohol everyday.  Asking for medication to kill him.  Notes epigastric abdominal pain, nausea and multiple episodes of emesis.  Denies falls.  Denies chest pain/sob, fever/chills.       alcoholism  anxious    place patient on withdrawal protocol and monitor and treatment based on the protocol  CECILIA sands RN in am round    - Ativan/Librium based on score of withdrawal  difficult  in iv access and was able to get it    - monitor BMP and LFTs   - MVI/folate/Thiamine   - psychiatry consult  as outpatient  will eval for depression  Because his compliance with treatments may be impacted by depression   wicho patient and advise            CE[facility] protocol with IV Ativan 2 mg every 4 hours, consider changing to PRN Ativan in AM if CIWA score allows.        long discussion with patient and family including son  counselling and advise   advise [facility] program vs inpatient after discussing with his pcp as outpatient  59 y/o male PMH of alcohol withdrawals, SI presents with abdominal pain for past 1 day.  Endorsing suicidal ideation because "he drinks too much." endorsing last drink at 3 or 4 AM today, endorsing drinking alcohol everyday.  Asking for medication to kill him.  Notes epigastric abdominal pain, nausea and multiple episodes of emesis.  Denies falls.  Denies chest pain/sob, fever/chills.       alcoholism  anxious    place patient on withdrawal protocol and monitor and treatment based on the protocol  CECILIA sands RN in am round    - Ativan/Librium based on score of withdrawal  difficult  in iv access and was able to get it    - monitor BMP and LFTs   - MVI/folate/Thiamine   - psychiatry consult  as outpatient  will eval for depression  Because his compliance with treatments may be impacted by depression   dw patient and advise   low plt and alcoholic liver diease and heparin like meds for dvt prophylaxis differ  Add mechanical dvt prophylaxis            [facility] protocol with IV Ativan 2 mg every 4 hours, consider changing to PRN Ativan in AM if CIWA score allows.        long discussion with patient and family including son  counselling and advise   advise [facility] program vs inpatient after discussing with his pcp as outpatient

## 2023-07-17 LAB
ALBUMIN SERPL ELPH-MCNC: 3.6 G/DL — SIGNIFICANT CHANGE UP (ref 3.3–5)
ALP SERPL-CCNC: 57 U/L — SIGNIFICANT CHANGE UP (ref 40–120)
ALT FLD-CCNC: 51 U/L — SIGNIFICANT CHANGE UP (ref 12–78)
ANION GAP SERPL CALC-SCNC: 8 MMOL/L — SIGNIFICANT CHANGE UP (ref 5–17)
AST SERPL-CCNC: 56 U/L — HIGH (ref 15–37)
BASE EXCESS BLDA CALC-SCNC: -0.2 MMOL/L — SIGNIFICANT CHANGE UP (ref -2–3)
BILIRUB SERPL-MCNC: 0.4 MG/DL — SIGNIFICANT CHANGE UP (ref 0.2–1.2)
BLOOD GAS COMMENTS ARTERIAL: SIGNIFICANT CHANGE UP
BUN SERPL-MCNC: 11 MG/DL — SIGNIFICANT CHANGE UP (ref 7–23)
CALCIUM SERPL-MCNC: 9.5 MG/DL — SIGNIFICANT CHANGE UP (ref 8.5–10.1)
CHLORIDE SERPL-SCNC: 110 MMOL/L — HIGH (ref 96–108)
CO2 BLDA-SCNC: 26 MMOL/L — HIGH (ref 19–24)
CO2 SERPL-SCNC: 21 MMOL/L — LOW (ref 22–31)
CREAT SERPL-MCNC: 1.08 MG/DL — SIGNIFICANT CHANGE UP (ref 0.5–1.3)
EGFR: 81 ML/MIN/1.73M2 — SIGNIFICANT CHANGE UP
GAS PNL BLDA: SIGNIFICANT CHANGE UP
GLUCOSE BLDC GLUCOMTR-MCNC: 130 MG/DL — HIGH (ref 70–99)
GLUCOSE BLDC GLUCOMTR-MCNC: 93 MG/DL — SIGNIFICANT CHANGE UP (ref 70–99)
GLUCOSE SERPL-MCNC: 86 MG/DL — SIGNIFICANT CHANGE UP (ref 70–99)
HCO3 BLDA-SCNC: 25 MMOL/L — SIGNIFICANT CHANGE UP (ref 21–28)
HCT VFR BLD CALC: 36.5 % — LOW (ref 39–50)
HGB BLD-MCNC: 11.1 G/DL — LOW (ref 13–17)
HOROWITZ INDEX BLDA+IHG-RTO: 50 — SIGNIFICANT CHANGE UP
MCHC RBC-ENTMCNC: 23.3 PG — LOW (ref 27–34)
MCHC RBC-ENTMCNC: 30.4 G/DL — LOW (ref 32–36)
MCV RBC AUTO: 76.5 FL — LOW (ref 80–100)
NRBC # BLD: 0 /100 WBCS — SIGNIFICANT CHANGE UP (ref 0–0)
PCO2 BLDA: 41 MMHG — SIGNIFICANT CHANGE UP (ref 32–46)
PH BLDA: 7.39 — SIGNIFICANT CHANGE UP (ref 7.35–7.45)
PLATELET # BLD AUTO: 151 K/UL — SIGNIFICANT CHANGE UP (ref 150–400)
PO2 BLDA: 220 MMHG — HIGH (ref 83–108)
POTASSIUM SERPL-MCNC: 3.8 MMOL/L — SIGNIFICANT CHANGE UP (ref 3.5–5.3)
POTASSIUM SERPL-SCNC: 3.8 MMOL/L — SIGNIFICANT CHANGE UP (ref 3.5–5.3)
PROT SERPL-MCNC: 7.9 GM/DL — SIGNIFICANT CHANGE UP (ref 6–8.3)
RBC # BLD: 4.77 M/UL — SIGNIFICANT CHANGE UP (ref 4.2–5.8)
RBC # FLD: 23.3 % — HIGH (ref 10.3–14.5)
SAO2 % BLDA: 99.5 % — HIGH (ref 94–98)
SODIUM SERPL-SCNC: 139 MMOL/L — SIGNIFICANT CHANGE UP (ref 135–145)
WBC # BLD: 3.46 K/UL — LOW (ref 3.8–10.5)
WBC # FLD AUTO: 3.46 K/UL — LOW (ref 3.8–10.5)

## 2023-07-17 PROCEDURE — 99232 SBSQ HOSP IP/OBS MODERATE 35: CPT

## 2023-07-17 PROCEDURE — 99291 CRITICAL CARE FIRST HOUR: CPT

## 2023-07-17 RX ORDER — THIAMINE MONONITRATE (VIT B1) 100 MG
100 TABLET ORAL DAILY
Refills: 0 | Status: DISCONTINUED | OUTPATIENT
Start: 2023-07-17 | End: 2023-07-23

## 2023-07-17 RX ORDER — FOLIC ACID 0.8 MG
1 TABLET ORAL DAILY
Refills: 0 | Status: DISCONTINUED | OUTPATIENT
Start: 2023-07-17 | End: 2023-07-21

## 2023-07-17 RX ORDER — PHENOBARBITAL 60 MG
130 TABLET ORAL ONCE
Refills: 0 | Status: DISCONTINUED | OUTPATIENT
Start: 2023-07-17 | End: 2023-07-17

## 2023-07-17 RX ORDER — SODIUM CHLORIDE 9 MG/ML
1000 INJECTION, SOLUTION INTRAVENOUS
Refills: 0 | Status: DISCONTINUED | OUTPATIENT
Start: 2023-07-17 | End: 2023-07-21

## 2023-07-17 RX ORDER — DEXMEDETOMIDINE HYDROCHLORIDE IN 0.9% SODIUM CHLORIDE 4 UG/ML
0.3 INJECTION INTRAVENOUS
Qty: 200 | Refills: 0 | Status: DISCONTINUED | OUTPATIENT
Start: 2023-07-17 | End: 2023-07-23

## 2023-07-17 RX ORDER — PHENOBARBITAL 60 MG
130 TABLET ORAL
Refills: 0 | Status: DISCONTINUED | OUTPATIENT
Start: 2023-07-17 | End: 2023-07-23

## 2023-07-17 RX ORDER — PHENOBARBITAL 60 MG
260 TABLET ORAL ONCE
Refills: 0 | Status: DISCONTINUED | OUTPATIENT
Start: 2023-07-17 | End: 2023-07-17

## 2023-07-17 RX ORDER — PHENOBARBITAL 60 MG
130 TABLET ORAL
Refills: 0 | Status: DISCONTINUED | OUTPATIENT
Start: 2023-07-17 | End: 2023-07-17

## 2023-07-17 RX ORDER — ENOXAPARIN SODIUM 100 MG/ML
40 INJECTION SUBCUTANEOUS EVERY 24 HOURS
Refills: 0 | Status: DISCONTINUED | OUTPATIENT
Start: 2023-07-17 | End: 2023-08-02

## 2023-07-17 RX ORDER — CHLORHEXIDINE GLUCONATE 213 G/1000ML
1 SOLUTION TOPICAL DAILY
Refills: 0 | Status: DISCONTINUED | OUTPATIENT
Start: 2023-07-17 | End: 2023-08-02

## 2023-07-17 RX ADMIN — DEXMEDETOMIDINE HYDROCHLORIDE IN 0.9% SODIUM CHLORIDE 6.31 MICROGRAM(S)/KG/HR: 4 INJECTION INTRAVENOUS at 19:35

## 2023-07-17 RX ADMIN — Medication 2 MILLIGRAM(S): at 08:44

## 2023-07-17 RX ADMIN — Medication 1 MILLIGRAM(S): at 02:03

## 2023-07-17 RX ADMIN — SODIUM CHLORIDE 100 MILLILITER(S): 9 INJECTION, SOLUTION INTRAVENOUS at 12:04

## 2023-07-17 RX ADMIN — SODIUM CHLORIDE 75 MILLILITER(S): 9 INJECTION, SOLUTION INTRAVENOUS at 19:35

## 2023-07-17 RX ADMIN — Medication 2 MILLIGRAM(S): at 05:29

## 2023-07-17 RX ADMIN — CHLORHEXIDINE GLUCONATE 1 APPLICATION(S): 213 SOLUTION TOPICAL at 15:05

## 2023-07-17 RX ADMIN — PANTOPRAZOLE SODIUM 40 MILLIGRAM(S): 20 TABLET, DELAYED RELEASE ORAL at 12:07

## 2023-07-17 RX ADMIN — Medication 2 MILLIGRAM(S): at 07:40

## 2023-07-17 RX ADMIN — DEXMEDETOMIDINE HYDROCHLORIDE IN 0.9% SODIUM CHLORIDE 6.31 MICROGRAM(S)/KG/HR: 4 INJECTION INTRAVENOUS at 17:01

## 2023-07-17 RX ADMIN — Medication 1 MILLIGRAM(S): at 16:29

## 2023-07-17 RX ADMIN — Medication 130 MILLIGRAM(S): at 14:19

## 2023-07-17 RX ADMIN — SODIUM CHLORIDE 75 MILLILITER(S): 9 INJECTION, SOLUTION INTRAVENOUS at 15:06

## 2023-07-17 RX ADMIN — Medication 130 MILLIGRAM(S): at 11:20

## 2023-07-17 RX ADMIN — MORPHINE SULFATE 2 MILLIGRAM(S): 50 CAPSULE, EXTENDED RELEASE ORAL at 00:27

## 2023-07-17 RX ADMIN — Medication 260 MILLIGRAM(S): at 15:37

## 2023-07-17 RX ADMIN — Medication 2 MILLIGRAM(S): at 01:11

## 2023-07-17 RX ADMIN — Medication 100 MILLIGRAM(S): at 16:30

## 2023-07-17 RX ADMIN — ENOXAPARIN SODIUM 40 MILLIGRAM(S): 100 INJECTION SUBCUTANEOUS at 16:31

## 2023-07-17 RX ADMIN — Medication 1 TABLET(S): at 12:05

## 2023-07-17 RX ADMIN — Medication 1 MILLIGRAM(S): at 06:33

## 2023-07-17 RX ADMIN — Medication 130 MILLIGRAM(S): at 12:27

## 2023-07-17 RX ADMIN — Medication 2 MILLIGRAM(S): at 11:06

## 2023-07-17 RX ADMIN — Medication 100 MILLIGRAM(S): at 12:04

## 2023-07-17 RX ADMIN — MORPHINE SULFATE 2 MILLIGRAM(S): 50 CAPSULE, EXTENDED RELEASE ORAL at 04:40

## 2023-07-17 RX ADMIN — MORPHINE SULFATE 2 MILLIGRAM(S): 50 CAPSULE, EXTENDED RELEASE ORAL at 00:12

## 2023-07-17 RX ADMIN — Medication 260 MILLIGRAM(S): at 16:17

## 2023-07-17 RX ADMIN — Medication 2 MILLIGRAM(S): at 09:54

## 2023-07-17 RX ADMIN — Medication 260 MILLIGRAM(S): at 14:51

## 2023-07-17 NOTE — PROGRESS NOTE ADULT - SUBJECTIVE AND OBJECTIVE BOX
Patient is a 58y old  Male who presents with a chief complaint of ETOH withdrawal. (16 Jul 2023 09:25)      INTERVAL HPI/OVERNIGHT EVENTS:  agitated nervous  anxious  denies cp palpitation   scire 12 on ciwa   dw staff     MEDICATIONS  (STANDING):  chlordiazePOXIDE 25 milliGRAM(s) Oral every 8 hours  LORazepam   Injectable 1 milliGRAM(s) IV Push every 4 hours  LORazepam   Injectable   IV Push   LORazepam   Injectable 0.5 milliGRAM(s) IV Push every 4 hours  multivitamin 1 Tablet(s) Oral daily  pantoprazole  Injectable 40 milliGRAM(s) IV Push daily  sodium chloride 0.45%. 1000 milliLiter(s) (100 mL/Hr) IV Continuous <Continuous>  thiamine Injectable 100 milliGRAM(s) IV Push daily    MEDICATIONS  (PRN):  LORazepam   Injectable 2 milliGRAM(s) IntraMuscular every 1 hour PRN Symptom-triggered: each CIWA -Ar score 8 or GREATER  morphine  - Injectable 2 milliGRAM(s) IV Push every 4 hours PRN Mild Pain (1 - 3)  simethicone 80 milliGRAM(s) Chew every 6 hours PRN Gas      Allergies    No Known Allergies    Intolerances        REVIEW OF SYSTEMS: anxious nervous agitation o cp palpitation an dsob   rest of ROS negative       CONSTITUTIONAL: No fever, weight loss, or fatigue  EYES: No eye pain, visual disturbances, or discharge  ENMT:  No difficulty hearing, tinnitus, vertigo; No sinus or throat pain  NECK: No pain or stiffness  BREASTS: No pain, masses, or nipple discharge  RESPIRATORY: No cough, wheezing, chills or hemoptysis; No shortness of breath  CARDIOVASCULAR: No chest pain, palpitations, dizziness, or leg swelling  GASTROINTESTINAL: No abdominal or epigastric pain. No nausea, vomiting, or hematemesis; No diarrhea or constipation. No melena or hematochezia.  GENITOURINARY: No dysuria, frequency, hematuria, or incontinence  NEUROLOGICAL: No headaches, memory loss, loss of strength, numbness, or tremors  SKIN: No itching, burning, rashes, or lesions   LYMPH NODES: No enlarged glands  ENDOCRINE: No heat or cold intolerance; No hair loss  MUSCULOSKELETAL: No joint pain or swelling; No muscle, back, or extremity pain  PSYCHIATRIC: No depression, anxiety, mood swings, or difficulty sleeping  HEME/LYMPH: No easy bruising, or bleeding gums  ALLERGY AND IMMUNOLOGIC: No hives or eczema    Vital Signs Last 24 Hrs  T(C): 36.6 (17 Jul 2023 03:55), Max: 36.6 (17 Jul 2023 03:55)  T(F): 97.8 (17 Jul 2023 03:55), Max: 97.8 (17 Jul 2023 03:55)  HR: 95 (17 Jul 2023 06:48) (75 - 95)  BP: 135/90 (17 Jul 2023 06:48) (127/79 - 149/86)  BP(mean): --  RR: 16 (17 Jul 2023 03:55) (16 - 18)  SpO2: 98% (17 Jul 2023 03:55) (97% - 98%)    Parameters below as of 16 Jul 2023 15:42  Patient On (Oxygen Delivery Method): room air        PHYSICAL EXAM:  GENERAL: NAD, well-groomed, well-developed  HEAD:  Atraumatic, Normocephalic  EYES: EOMI, PERRLA, conjunctiva and sclera clear  ENMT: No tonsillar erythema, exudates, or enlargement; Moist mucous membranes, Good dentition, No lesions  NECK: Supple, No JVD, Normal thyroid  NERVOUS SYSTEM:  Alert & Oriented X3, Good concentration; Motor Strength 5/5 B/L upper and lower extremities; DTRs 2+ intact and symmetric  CHEST/LUNG: Clear to percussion bilaterally; No rales, rhonchi, wheezing, or rubs  HEART: Regular rate and rhythm; No murmurs, rubs, or gallops  ABDOMEN: Soft, Nontender, Nondistended; Bowel sounds present  EXTREMITIES:  2+ Peripheral Pulses, No clubbing, cyanosis, or edema  LYMPH: No lymphadenopathy noted  SKIN: No rashes or lesions    LABS:                        11.1   3.46  )-----------( 151      ( 17 Jul 2023 07:00 )             36.5     07-17    139  |  110<H>  |  11  ----------------------------<  86  3.8   |  21<L>  |  1.08    Ca    9.5      17 Jul 2023 07:00    TPro  7.9  /  Alb  3.6  /  TBili  0.4  /  DBili  x   /  AST  56<H>  /  ALT  51  /  AlkPhos  57  07-17      Urinalysis Basic - ( 17 Jul 2023 07:00 )    Color: x / Appearance: x / SG: x / pH: x  Gluc: 86 mg/dL / Ketone: x  / Bili: x / Urobili: x   Blood: x / Protein: x / Nitrite: x   Leuk Esterase: x / RBC: x / WBC x   Sq Epi: x / Non Sq Epi: x / Bacteria: x      CAPILLARY BLOOD GLUCOSE          RADIOLOGY & ADDITIONAL TESTS:    Imaging Personally Reviewed:  [ X] YES  [ ] NO    Consultant(s) Notes Reviewed:  [ X] YES  [ ] NO    Care Discussed with Consultants/Other Providers [X ] YES  [ ] NO

## 2023-07-17 NOTE — CHART NOTE - NSCHARTNOTEFT_GEN_A_CORE
Informed by RN that the patient was desaturating.   On arrival, sats were 79 %. Pt very sleepy likely from meds. Minimal improvement with nasal cannula.   Patient was bagged and nasal pharyngeal airway was placed.  Patient saturation improved to 99 %. End tidal CO2: 27. RR: 14  Patient started waking up.   Precedex drip decreased.   ABG and CXR ordered.   Will continue to monitor closely.

## 2023-07-17 NOTE — CONSULT NOTE ADULT - ASSESSMENT
59 y/o male PMH of alcohol abuse with hx of DTs requiring frequent ICU admissions for phenobarb and precedex presented with abdominal pain on 7/12 with etoh level of 318. Admitted for etoh intox. Pt now with worsening DTs requiring ICU admission for further management    # Neuro:  //encephalopathy 2/2 DTs  - agitated and combative   - CTH 7/5 neg  - etoh level 318 on 7/12. Now in active DTs.   -s/p ~18mg ativan/24 hrs and s/p 390mg phenobarb on the floor. Will cont with prn phenobarb dosing for DTs and avoid further benzos.  -precedex for behavioral control if absolutely needed  -cont daily thiamine/folic acid/multivitamin    #Resp:  -satting adequately on RA, maintain sats>92%     #CV:  -sinus tach likely in setting of active DTs  -HD stable without vasopressor requirements  - MAP goal>65    #GI:  -Diet: npo for now while altered   - GI ppx: protonix   - ct abd 7/5 neg. abd soft.     #Renal:  - fluids: D5/LR  - crn wnl  - voiding freely, making adequate UO  - replete lytes as appropriate     #ID:  - afebrile, wbc nl  - cont to monitor off abx     #Heme:  //DVT ppx  - cbc stable  -SCDs/chemoppx: lovenox    #Endo:  - maintaining goal glucose<180 on bmp  -tsh wnl     case and plan discussed with Dr Woo

## 2023-07-17 NOTE — CONSULT NOTE ADULT - SUBJECTIVE AND OBJECTIVE BOX
CHIEF COMPLAINT: etoh intox     HPI:  57 y/o male PMH of alcohol abuse with hx of DTs requiring frequent ICU admissions for phenobarb and precedex presented with abdominal pain on 7/12. Pt came to the ER with simillar complaints the week prior with CT abd neg and CTH neg on 7/5. Pt was admitted to the floor on 7/12 with etoh level of 318 for etoh intox. Pt drinks ~1L of vodka/day with last drink on 7/12. Pt was acting his nl self on the floor. Pt was started on standing benzos for prevention of etoh withdrawal.   Overnight, pt developed worsening DTs, requiring 2mg ativan q1hrs this AM. Pt recieved ~18mg ativan in last 24hrs.   RRT called for CIWA>20 again despite ativan therapy. PT initiated on phenobarb therapy 130mg q1hr prn. Pt recieved 390mg phenobarb and RRT called again for pt trying to get out of bed and being agitated. Pt accepted to ICU for further management of DTs.    Subjective: Pt seen and examined at the bedside. Pt speaking incoherently in his native language, gripping tightly to the tele monitor believing it is his cell phone. PT also attempting to get out of bed.     PAST MEDICAL & SURGICAL HISTORY:  PUD (peptic ulcer disease)      Mixed hyperlipidemia      EtOH dependence      Gastritis      Substance abuse      DTs (delirium tremens)      No significant past surgical history          FAMILY HISTORY:  FH: HTN (hypertension)        SOCIAL HISTORY:  Smoking: unable to obtain   EtOH Use: YES  Occupation: unable to obtain       Allergies    No Known Allergies    Intolerances        HOME MEDICATIONS:    REVIEW OF SYSTEMS:  Constitutional:   Eyes:  ENT:  CV:  Resp:  GI:  :  MSK:  Integumentary:  Neurological:  Psychiatric:  Endocrine:  Hematologic/Lymphatic:  Allergic/Immunologic:  [x ] Unable to assess ROS because AMS    OBJECTIVE:  ICU Vital Signs Last 24 Hrs  T(C): 36.6 (17 Jul 2023 10:13), Max: 36.6 (17 Jul 2023 03:55)  T(F): 97.8 (17 Jul 2023 10:13), Max: 97.8 (17 Jul 2023 03:55)  HR: 90 (17 Jul 2023 15:00) (75 - 109)  BP: 141/93 (17 Jul 2023 15:00) (127/79 - 159/104)  BP(mean): 105 (17 Jul 2023 15:00) (105 - 120)  ABP: --  ABP(mean): --  RR: 21 (17 Jul 2023 15:00) (16 - 21)  SpO2: 95% (17 Jul 2023 15:00) (95% - 99%)    O2 Parameters below as of 16 Jul 2023 15:42  Patient On (Oxygen Delivery Method): room air              07-16 @ 07:01  -  07-17 @ 07:00  --------------------------------------------------------  IN: 1800 mL / OUT: 2250 mL / NET: -450 mL      CAPILLARY BLOOD GLUCOSE      POCT Blood Glucose.: 93 mg/dL (17 Jul 2023 14:17)      PHYSICAL EXAM:  GENERAL: agitated, tremlous, speaking incoherently in his native language   HEAD:  Atraumatic, normocephalic  EYES: EOMI, PERRL,   NECK: Supple, trachea midline, no JVD  HEART: NSR  LUNGS: Unlabored respirations.  Clear to auscultation bilaterally, no crackles, wheezing, or rhonchi  ABDOMEN: Soft, nontender, nondistended, +BS  EXTREMITIES: 2+ peripheral pulses bilaterally. No clubbing, cyanosis, or edema  NERVOUS SYSTEM: not following commands but BENDER       HOSPITAL MEDICATIONS:  MEDICATIONS  (STANDING):  chlorhexidine 2% Cloths 1 Application(s) Topical daily  dextrose 5% + lactated ringers. 1000 milliLiter(s) (75 mL/Hr) IV Continuous <Continuous>  folic acid Injectable 1 milliGRAM(s) IV Push daily  multivitamin 1 Tablet(s) Oral daily  pantoprazole  Injectable 40 milliGRAM(s) IV Push daily  PHENobarbital Injectable 260 milliGRAM(s) IV Push once  thiamine Injectable 100 milliGRAM(s) IV Push daily    MEDICATIONS  (PRN):  simethicone 80 milliGRAM(s) Chew every 6 hours PRN Gas      LABS:                        11.1   3.46  )-----------( 151      ( 17 Jul 2023 07:00 )             36.5     07-17    139  |  110<H>  |  11  ----------------------------<  86  3.8   |  21<L>  |  1.08    Ca    9.5      17 Jul 2023 07:00    TPro  7.9  /  Alb  3.6  /  TBili  0.4  /  DBili  x   /  AST  56<H>  /  ALT  51  /  AlkPhos  57  07-17      Urinalysis Basic - ( 17 Jul 2023 07:00 )    Color: x / Appearance: x / SG: x / pH: x  Gluc: 86 mg/dL / Ketone: x  / Bili: x / Urobili: x   Blood: x / Protein: x / Nitrite: x   Leuk Esterase: x / RBC: x / WBC x   Sq Epi: x / Non Sq Epi: x / Bacteria: x            MICROBIOLOGY: reviewed    RADIOLOGY:  [x ] Reviewed and interpreted by me    EKG:

## 2023-07-17 NOTE — RAPID RESPONSE TEAM SUMMARY - NSSITUATIONBACKGROUNDRRT_GEN_ALL_CORE
etoh abuse ciwa elevated 
59 yo male etoh abuse HD#5 on ativan taper curent dose 1 g q 4 pt with high ciwa requiring 2mg iv ativan hourly for the last 4 hours   rrt got ciwa of 20

## 2023-07-17 NOTE — PROGRESS NOTE ADULT - ASSESSMENT
57 y/o male PMH of alcohol withdrawals, SI presents with abdominal pain for past 1 day.  Endorsing suicidal ideation because "he drinks too much." endorsing last drink at 3 or 4 AM today, endorsing drinking alcohol everyday.  Asking for medication to kill him.  Notes epigastric abdominal pain, nausea and multiple episodes of emesis.  Denies falls.  Denies chest pain/sob, fever/chills.       alcoholism  anxious agitated and   CECILIA 11 to 12 wicho staf   place patient on withdrawal protocol and monitor and treatment based on the protocol  CECILIA sands RN in am round    - Ativan/Librium based on score of withdrawal  difficult  in iv access and was able to get it    - monitor BMP and LFTs   - MVI/folate/Thiamine   - psychiatry consult  as outpatient  will eval for depression  Because his compliance with treatments may be impacted by depression   wicho patient and advise   low plt and alcoholic liver diease and heparin like meds for dvt prophylaxis differ  Add mechanical dvt prophylaxis                  long discussion with patient and family including son  counselling and advise   advise [facility] program vs inpatient after discussing with his pcp as outpatient

## 2023-07-17 NOTE — CONSULT NOTE ADULT - CRITICAL CARE ATTENDING COMMENT
57 y/o M w/ETOH abuse admitted for abdominal pain now with alcohol withdrawal with DTs.    - Transfer to ICU  - Precedex gtt  - Phenobarb  - DVT prophylaxis

## 2023-07-18 LAB
A1C WITH ESTIMATED AVERAGE GLUCOSE RESULT: 5.3 % — SIGNIFICANT CHANGE UP (ref 4–5.6)
ALBUMIN SERPL ELPH-MCNC: 3.3 G/DL — SIGNIFICANT CHANGE UP (ref 3.3–5)
ALP SERPL-CCNC: 52 U/L — SIGNIFICANT CHANGE UP (ref 40–120)
ALT FLD-CCNC: 53 U/L — SIGNIFICANT CHANGE UP (ref 12–78)
ANION GAP SERPL CALC-SCNC: 6 MMOL/L — SIGNIFICANT CHANGE UP (ref 5–17)
AST SERPL-CCNC: 46 U/L — HIGH (ref 15–37)
BASOPHILS # BLD AUTO: 0.02 K/UL — SIGNIFICANT CHANGE UP (ref 0–0.2)
BASOPHILS NFR BLD AUTO: 0.4 % — SIGNIFICANT CHANGE UP (ref 0–2)
BILIRUB SERPL-MCNC: 0.6 MG/DL — SIGNIFICANT CHANGE UP (ref 0.2–1.2)
BUN SERPL-MCNC: 9 MG/DL — SIGNIFICANT CHANGE UP (ref 7–23)
CALCIUM SERPL-MCNC: 9 MG/DL — SIGNIFICANT CHANGE UP (ref 8.5–10.1)
CHLORIDE SERPL-SCNC: 110 MMOL/L — HIGH (ref 96–108)
CO2 SERPL-SCNC: 23 MMOL/L — SIGNIFICANT CHANGE UP (ref 22–31)
CREAT SERPL-MCNC: 0.8 MG/DL — SIGNIFICANT CHANGE UP (ref 0.5–1.3)
EGFR: 104 ML/MIN/1.73M2 — SIGNIFICANT CHANGE UP
EOSINOPHIL # BLD AUTO: 0.18 K/UL — SIGNIFICANT CHANGE UP (ref 0–0.5)
EOSINOPHIL NFR BLD AUTO: 3.8 % — SIGNIFICANT CHANGE UP (ref 0–6)
ESTIMATED AVERAGE GLUCOSE: 105 MG/DL — SIGNIFICANT CHANGE UP (ref 68–114)
GLUCOSE BLDC GLUCOMTR-MCNC: 104 MG/DL — HIGH (ref 70–99)
GLUCOSE BLDC GLUCOMTR-MCNC: 113 MG/DL — HIGH (ref 70–99)
GLUCOSE BLDC GLUCOMTR-MCNC: 117 MG/DL — HIGH (ref 70–99)
GLUCOSE SERPL-MCNC: 133 MG/DL — HIGH (ref 70–99)
HCT VFR BLD CALC: 35 % — LOW (ref 39–50)
HGB BLD-MCNC: 10.7 G/DL — LOW (ref 13–17)
IMM GRANULOCYTES NFR BLD AUTO: 0.4 % — SIGNIFICANT CHANGE UP (ref 0–0.9)
LYMPHOCYTES # BLD AUTO: 1.36 K/UL — SIGNIFICANT CHANGE UP (ref 1–3.3)
LYMPHOCYTES # BLD AUTO: 28.8 % — SIGNIFICANT CHANGE UP (ref 13–44)
MAGNESIUM SERPL-MCNC: 2.2 MG/DL — SIGNIFICANT CHANGE UP (ref 1.6–2.6)
MCHC RBC-ENTMCNC: 23.4 PG — LOW (ref 27–34)
MCHC RBC-ENTMCNC: 30.6 G/DL — LOW (ref 32–36)
MCV RBC AUTO: 76.4 FL — LOW (ref 80–100)
MONOCYTES # BLD AUTO: 0.51 K/UL — SIGNIFICANT CHANGE UP (ref 0–0.9)
MONOCYTES NFR BLD AUTO: 10.8 % — SIGNIFICANT CHANGE UP (ref 2–14)
MRSA PCR RESULT.: SIGNIFICANT CHANGE UP
NEUTROPHILS # BLD AUTO: 2.63 K/UL — SIGNIFICANT CHANGE UP (ref 1.8–7.4)
NEUTROPHILS NFR BLD AUTO: 55.8 % — SIGNIFICANT CHANGE UP (ref 43–77)
NRBC # BLD: 0 /100 WBCS — SIGNIFICANT CHANGE UP (ref 0–0)
PHOSPHATE SERPL-MCNC: 3.3 MG/DL — SIGNIFICANT CHANGE UP (ref 2.5–4.5)
PLATELET # BLD AUTO: 146 K/UL — LOW (ref 150–400)
POTASSIUM SERPL-MCNC: 3.7 MMOL/L — SIGNIFICANT CHANGE UP (ref 3.5–5.3)
POTASSIUM SERPL-SCNC: 3.7 MMOL/L — SIGNIFICANT CHANGE UP (ref 3.5–5.3)
PROT SERPL-MCNC: 7.2 GM/DL — SIGNIFICANT CHANGE UP (ref 6–8.3)
RBC # BLD: 4.58 M/UL — SIGNIFICANT CHANGE UP (ref 4.2–5.8)
RBC # FLD: 23.1 % — HIGH (ref 10.3–14.5)
S AUREUS DNA NOSE QL NAA+PROBE: SIGNIFICANT CHANGE UP
SODIUM SERPL-SCNC: 139 MMOL/L — SIGNIFICANT CHANGE UP (ref 135–145)
WBC # BLD: 4.72 K/UL — SIGNIFICANT CHANGE UP (ref 3.8–10.5)
WBC # FLD AUTO: 4.72 K/UL — SIGNIFICANT CHANGE UP (ref 3.8–10.5)

## 2023-07-18 PROCEDURE — 99291 CRITICAL CARE FIRST HOUR: CPT

## 2023-07-18 PROCEDURE — 71045 X-RAY EXAM CHEST 1 VIEW: CPT | Mod: 26

## 2023-07-18 RX ORDER — POTASSIUM CHLORIDE 20 MEQ
10 PACKET (EA) ORAL
Refills: 0 | Status: COMPLETED | OUTPATIENT
Start: 2023-07-18 | End: 2023-07-18

## 2023-07-18 RX ORDER — HYDRALAZINE HCL 50 MG
10 TABLET ORAL ONCE
Refills: 0 | Status: COMPLETED | OUTPATIENT
Start: 2023-07-18 | End: 2023-07-18

## 2023-07-18 RX ADMIN — Medication 1 MILLIGRAM(S): at 11:27

## 2023-07-18 RX ADMIN — Medication 100 MILLIEQUIVALENT(S): at 05:17

## 2023-07-18 RX ADMIN — DEXMEDETOMIDINE HYDROCHLORIDE IN 0.9% SODIUM CHLORIDE 6.31 MICROGRAM(S)/KG/HR: 4 INJECTION INTRAVENOUS at 07:28

## 2023-07-18 RX ADMIN — CHLORHEXIDINE GLUCONATE 1 APPLICATION(S): 213 SOLUTION TOPICAL at 11:27

## 2023-07-18 RX ADMIN — Medication 5 MILLIGRAM(S): at 23:50

## 2023-07-18 RX ADMIN — PANTOPRAZOLE SODIUM 40 MILLIGRAM(S): 20 TABLET, DELAYED RELEASE ORAL at 11:28

## 2023-07-18 RX ADMIN — Medication 100 MILLIEQUIVALENT(S): at 07:33

## 2023-07-18 RX ADMIN — DEXMEDETOMIDINE HYDROCHLORIDE IN 0.9% SODIUM CHLORIDE 6.31 MICROGRAM(S)/KG/HR: 4 INJECTION INTRAVENOUS at 00:28

## 2023-07-18 RX ADMIN — DEXMEDETOMIDINE HYDROCHLORIDE IN 0.9% SODIUM CHLORIDE 6.31 MICROGRAM(S)/KG/HR: 4 INJECTION INTRAVENOUS at 22:38

## 2023-07-18 RX ADMIN — Medication 130 MILLIGRAM(S): at 23:30

## 2023-07-18 RX ADMIN — SODIUM CHLORIDE 75 MILLILITER(S): 9 INJECTION, SOLUTION INTRAVENOUS at 17:41

## 2023-07-18 RX ADMIN — Medication 100 MILLIEQUIVALENT(S): at 06:33

## 2023-07-18 RX ADMIN — SODIUM CHLORIDE 75 MILLILITER(S): 9 INJECTION, SOLUTION INTRAVENOUS at 05:17

## 2023-07-18 RX ADMIN — Medication 130 MILLIGRAM(S): at 20:32

## 2023-07-18 RX ADMIN — Medication 100 MILLIGRAM(S): at 11:33

## 2023-07-18 RX ADMIN — ENOXAPARIN SODIUM 40 MILLIGRAM(S): 100 INJECTION SUBCUTANEOUS at 15:36

## 2023-07-18 RX ADMIN — DEXMEDETOMIDINE HYDROCHLORIDE IN 0.9% SODIUM CHLORIDE 6.31 MICROGRAM(S)/KG/HR: 4 INJECTION INTRAVENOUS at 19:49

## 2023-07-18 NOTE — PROGRESS NOTE ADULT - SUBJECTIVE AND OBJECTIVE BOX
INTERVAL HPI/OVERNIGHT EVENTS:   HPI:  57 y/o male PMH of alcohol abuse with hx of DTs requiring frequent ICU admissions for phenobarb and precedex presented with abdominal pain on 7/12. Pt came to the ER with simillar complaints the week prior with CT abd neg and CTH neg on 7/5. Pt was admitted to the floor on 7/12 with etoh level of 318 for etoh intox. Pt drinks ~1L of vodka/day with last drink on 7/12. Pt was acting his nl self on the floor. Pt was started on standing benzos for prevention of etoh withdrawal.   Overnight, pt developed worsening DTs, requiring 2mg ativan q1hrs this AM. Pt recieved ~18mg ativan in last 24hrs.   RRT called for CIWA>20 again despite ativan therapy. PT initiated on phenobarb therapy 130mg q1hr prn. Pt recieved 390mg phenobarb and RRT called again for pt trying to get out of bed and being agitated. Pt accepted to ICU for further management of DTs.        CENTRAL LINE: [ ] YES [ ] NO  LOCATION:   DATE INSERTED:  REMOVE: [ ] YES [ ] NO  EXPLAIN:    CHAN: [ ] YES [ ] NO    DATE INSERTED:  REMOVE:  [ ] YES [ ] NO  EXPLAIN:    A-LINE:  [ ] YES [ ] NO  LOCATION:   DATE INSERTED:  REMOVE:  [ ] YES [ ] NO  EXPLAIN:    PAST MEDICAL & SURGICAL HISTORY:  PUD (peptic ulcer disease)      Mixed hyperlipidemia      EtOH dependence      Gastritis      Substance abuse      DTs (delirium tremens)      No significant past surgical history          REVIEW OF SYSTEMS:    Negative ROS aside from HPI/Interval events above.    ICU Vital Signs Last 24 Hrs  T(C): 35.8 (18 Jul 2023 08:00), Max: 36.8 (17 Jul 2023 16:00)  T(F): 96.5 (18 Jul 2023 08:00), Max: 98.3 (17 Jul 2023 16:00)  HR: 45 (18 Jul 2023 10:00) (44 - 105)  BP: 138/86 (18 Jul 2023 10:00) (109/75 - 167/91)  BP(mean): 98 (18 Jul 2023 10:00) (82 - 120)  ABP: --  ABP(mean): --  RR: 12 (18 Jul 2023 10:00) (9 - 22)  SpO2: 100% (18 Jul 2023 10:00) (83% - 100%)    O2 Parameters below as of 18 Jul 2023 08:00  Patient On (Oxygen Delivery Method): nasal cannula, 2L            ABG - ( 17 Jul 2023 22:09 )  pH, Arterial: 7.39  pH, Blood: x     /  pCO2: 41    /  pO2: 220   / HCO3: 25    / Base Excess: -0.2  /  SaO2: 99.5                I&O's Detail    17 Jul 2023 07:01  -  18 Jul 2023 07:00  --------------------------------------------------------  IN:    Dexmedetomidine: 109.7 mL    dextrose 5% + lactated ringers: 1275 mL    IV PiggyBack: 200 mL  Total IN: 1584.7 mL    OUT:    Indwelling Catheter - Urethral (mL): 1410 mL  Total OUT: 1410 mL    Total NET: 174.7 mL      18 Jul 2023 07:01  -  18 Jul 2023 10:36  --------------------------------------------------------  IN:    Dexmedetomidine: 26.9 mL    IV PiggyBack: 100 mL  Total IN: 126.9 mL    OUT:    Indwelling Catheter - Urethral (mL): 305 mL  Total OUT: 305 mL    Total NET: -178.1 mL            CAPILLARY BLOOD GLUCOSE      POCT Blood Glucose.: 93 mg/dL (17 Jul 2023 14:17)  POCT Blood Glucose.: 130 mg/dL (17 Jul 2023 11:08)        PHYSICAL EXAM:    calm. sedated. currently on precedex  RRR  cta b/l  soft nt nd  no edema      LABS:                        10.7   4.72  )-----------( 146      ( 18 Jul 2023 04:10 )             35.0      07-18    139  |  110<H>  |  9   ----------------------------<  133<H>  3.7   |  23  |  0.80    Ca    9.0      18 Jul 2023 04:10  Phos  3.3     07-18  Mg     2.2     07-18    TPro  7.2  /  Alb  3.3  /  TBili  0.6  /  DBili  x   /  AST  46<H>  /  ALT  53  /  AlkPhos  52  07-18      Urinalysis Basic - ( 18 Jul 2023 04:10 )    Color: x / Appearance: x / SG: x / pH: x  Gluc: 133 mg/dL / Ketone: x  / Bili: x / Urobili: x   Blood: x / Protein: x / Nitrite: x   Leuk Esterase: x / RBC: x / WBC x   Sq Epi: x / Non Sq Epi: x / Bacteria: x          RADIOLOGY & ADDITIONAL STUDIES:      CRITICAL CARE TIME SPENT: 40 minutes

## 2023-07-18 NOTE — PROGRESS NOTE ADULT - ASSESSMENT
59 y/o male PMH of alcohol abuse with hx of DTs requiring frequent ICU admissions for phenobarb and precedex presented with abdominal pain on 7/12 with etoh level of 318. Admitted for etoh intox. Pt now with worsening DTs requiring ICU admission for further management.    c/w precedex.   prn phenobarb. no more benzos  thiamine/folate/mvi  aspiration precautions  CIWA scale  titrate down precedex prn.  normal diet if awake and tolerating and calm.  resp normal.  bradycardic but sinus and HD stable. likely sleeping with precedex effect. cont. monitoring.  dvt ppx  critically ill. high risk for decompensation.

## 2023-07-19 LAB
ALBUMIN SERPL ELPH-MCNC: 3.5 G/DL — SIGNIFICANT CHANGE UP (ref 3.3–5)
ALP SERPL-CCNC: 62 U/L — SIGNIFICANT CHANGE UP (ref 40–120)
ALT FLD-CCNC: 54 U/L — SIGNIFICANT CHANGE UP (ref 12–78)
ANION GAP SERPL CALC-SCNC: 7 MMOL/L — SIGNIFICANT CHANGE UP (ref 5–17)
AST SERPL-CCNC: 42 U/L — HIGH (ref 15–37)
BASOPHILS # BLD AUTO: 0.01 K/UL — SIGNIFICANT CHANGE UP (ref 0–0.2)
BASOPHILS NFR BLD AUTO: 0.2 % — SIGNIFICANT CHANGE UP (ref 0–2)
BILIRUB SERPL-MCNC: 0.6 MG/DL — SIGNIFICANT CHANGE UP (ref 0.2–1.2)
BUN SERPL-MCNC: 7 MG/DL — SIGNIFICANT CHANGE UP (ref 7–23)
CALCIUM SERPL-MCNC: 9.2 MG/DL — SIGNIFICANT CHANGE UP (ref 8.5–10.1)
CHLORIDE SERPL-SCNC: 109 MMOL/L — HIGH (ref 96–108)
CO2 SERPL-SCNC: 25 MMOL/L — SIGNIFICANT CHANGE UP (ref 22–31)
CREAT SERPL-MCNC: 0.91 MG/DL — SIGNIFICANT CHANGE UP (ref 0.5–1.3)
EGFR: 99 ML/MIN/1.73M2 — SIGNIFICANT CHANGE UP
EOSINOPHIL # BLD AUTO: 0.14 K/UL — SIGNIFICANT CHANGE UP (ref 0–0.5)
EOSINOPHIL NFR BLD AUTO: 3.2 % — SIGNIFICANT CHANGE UP (ref 0–6)
GLUCOSE BLDC GLUCOMTR-MCNC: 104 MG/DL — HIGH (ref 70–99)
GLUCOSE BLDC GLUCOMTR-MCNC: 99 MG/DL — SIGNIFICANT CHANGE UP (ref 70–99)
GLUCOSE SERPL-MCNC: 104 MG/DL — HIGH (ref 70–99)
HCT VFR BLD CALC: 41.3 % — SIGNIFICANT CHANGE UP (ref 39–50)
HGB BLD-MCNC: 12.5 G/DL — LOW (ref 13–17)
IMM GRANULOCYTES NFR BLD AUTO: 0.5 % — SIGNIFICANT CHANGE UP (ref 0–0.9)
LYMPHOCYTES # BLD AUTO: 0.81 K/UL — LOW (ref 1–3.3)
LYMPHOCYTES # BLD AUTO: 18.8 % — SIGNIFICANT CHANGE UP (ref 13–44)
MAGNESIUM SERPL-MCNC: 2 MG/DL — SIGNIFICANT CHANGE UP (ref 1.6–2.6)
MCHC RBC-ENTMCNC: 23.2 PG — LOW (ref 27–34)
MCHC RBC-ENTMCNC: 30.3 G/DL — LOW (ref 32–36)
MCV RBC AUTO: 76.8 FL — LOW (ref 80–100)
MONOCYTES # BLD AUTO: 0.44 K/UL — SIGNIFICANT CHANGE UP (ref 0–0.9)
MONOCYTES NFR BLD AUTO: 10.2 % — SIGNIFICANT CHANGE UP (ref 2–14)
NEUTROPHILS # BLD AUTO: 2.89 K/UL — SIGNIFICANT CHANGE UP (ref 1.8–7.4)
NEUTROPHILS NFR BLD AUTO: 67.1 % — SIGNIFICANT CHANGE UP (ref 43–77)
NRBC # BLD: 0 /100 WBCS — SIGNIFICANT CHANGE UP (ref 0–0)
PHOSPHATE SERPL-MCNC: 2.5 MG/DL — SIGNIFICANT CHANGE UP (ref 2.5–4.5)
PLATELET # BLD AUTO: 184 K/UL — SIGNIFICANT CHANGE UP (ref 150–400)
POTASSIUM SERPL-MCNC: 3.2 MMOL/L — LOW (ref 3.5–5.3)
POTASSIUM SERPL-SCNC: 3.2 MMOL/L — LOW (ref 3.5–5.3)
PROT SERPL-MCNC: 7.8 GM/DL — SIGNIFICANT CHANGE UP (ref 6–8.3)
RBC # BLD: 5.38 M/UL — SIGNIFICANT CHANGE UP (ref 4.2–5.8)
RBC # FLD: 23.3 % — HIGH (ref 10.3–14.5)
SODIUM SERPL-SCNC: 141 MMOL/L — SIGNIFICANT CHANGE UP (ref 135–145)
WBC # BLD: 4.31 K/UL — SIGNIFICANT CHANGE UP (ref 3.8–10.5)
WBC # FLD AUTO: 4.31 K/UL — SIGNIFICANT CHANGE UP (ref 3.8–10.5)

## 2023-07-19 PROCEDURE — 99291 CRITICAL CARE FIRST HOUR: CPT

## 2023-07-19 RX ORDER — POTASSIUM CHLORIDE 20 MEQ
10 PACKET (EA) ORAL
Refills: 0 | Status: COMPLETED | OUTPATIENT
Start: 2023-07-19 | End: 2023-07-19

## 2023-07-19 RX ORDER — HYDRALAZINE HCL 50 MG
5 TABLET ORAL ONCE
Refills: 0 | Status: COMPLETED | OUTPATIENT
Start: 2023-07-19 | End: 2023-07-18

## 2023-07-19 RX ADMIN — DEXMEDETOMIDINE HYDROCHLORIDE IN 0.9% SODIUM CHLORIDE 6.31 MICROGRAM(S)/KG/HR: 4 INJECTION INTRAVENOUS at 21:37

## 2023-07-19 RX ADMIN — SODIUM CHLORIDE 75 MILLILITER(S): 9 INJECTION, SOLUTION INTRAVENOUS at 21:37

## 2023-07-19 RX ADMIN — DEXMEDETOMIDINE HYDROCHLORIDE IN 0.9% SODIUM CHLORIDE 6.31 MICROGRAM(S)/KG/HR: 4 INJECTION INTRAVENOUS at 13:31

## 2023-07-19 RX ADMIN — DEXMEDETOMIDINE HYDROCHLORIDE IN 0.9% SODIUM CHLORIDE 6.31 MICROGRAM(S)/KG/HR: 4 INJECTION INTRAVENOUS at 15:57

## 2023-07-19 RX ADMIN — Medication 100 MILLIEQUIVALENT(S): at 04:51

## 2023-07-19 RX ADMIN — ENOXAPARIN SODIUM 40 MILLIGRAM(S): 100 INJECTION SUBCUTANEOUS at 17:17

## 2023-07-19 RX ADMIN — DEXMEDETOMIDINE HYDROCHLORIDE IN 0.9% SODIUM CHLORIDE 6.31 MICROGRAM(S)/KG/HR: 4 INJECTION INTRAVENOUS at 01:40

## 2023-07-19 RX ADMIN — Medication 130 MILLIGRAM(S): at 04:20

## 2023-07-19 RX ADMIN — DEXMEDETOMIDINE HYDROCHLORIDE IN 0.9% SODIUM CHLORIDE 6.31 MICROGRAM(S)/KG/HR: 4 INJECTION INTRAVENOUS at 18:45

## 2023-07-19 RX ADMIN — DEXMEDETOMIDINE HYDROCHLORIDE IN 0.9% SODIUM CHLORIDE 6.31 MICROGRAM(S)/KG/HR: 4 INJECTION INTRAVENOUS at 08:32

## 2023-07-19 RX ADMIN — Medication 1 TABLET(S): at 11:01

## 2023-07-19 RX ADMIN — PANTOPRAZOLE SODIUM 40 MILLIGRAM(S): 20 TABLET, DELAYED RELEASE ORAL at 11:02

## 2023-07-19 RX ADMIN — Medication 130 MILLIGRAM(S): at 21:40

## 2023-07-19 RX ADMIN — Medication 130 MILLIGRAM(S): at 14:20

## 2023-07-19 RX ADMIN — Medication 100 MILLIEQUIVALENT(S): at 06:17

## 2023-07-19 RX ADMIN — Medication 100 MILLIGRAM(S): at 11:02

## 2023-07-19 RX ADMIN — Medication 1 MILLIGRAM(S): at 11:02

## 2023-07-19 RX ADMIN — Medication 100 MILLIEQUIVALENT(S): at 07:37

## 2023-07-19 RX ADMIN — CHLORHEXIDINE GLUCONATE 1 APPLICATION(S): 213 SOLUTION TOPICAL at 11:03

## 2023-07-19 RX ADMIN — DEXMEDETOMIDINE HYDROCHLORIDE IN 0.9% SODIUM CHLORIDE 6.31 MICROGRAM(S)/KG/HR: 4 INJECTION INTRAVENOUS at 03:52

## 2023-07-19 RX ADMIN — SODIUM CHLORIDE 75 MILLILITER(S): 9 INJECTION, SOLUTION INTRAVENOUS at 04:51

## 2023-07-19 RX ADMIN — DEXMEDETOMIDINE HYDROCHLORIDE IN 0.9% SODIUM CHLORIDE 6.31 MICROGRAM(S)/KG/HR: 4 INJECTION INTRAVENOUS at 11:01

## 2023-07-19 RX ADMIN — DEXMEDETOMIDINE HYDROCHLORIDE IN 0.9% SODIUM CHLORIDE 6.31 MICROGRAM(S)/KG/HR: 4 INJECTION INTRAVENOUS at 06:17

## 2023-07-19 NOTE — PROGRESS NOTE ADULT - SUBJECTIVE AND OBJECTIVE BOX
INTERVAL HPI/OVERNIGHT EVENTS:   HPI:  59 y/o male PMH of alcohol withdrawals, SI presents with abdominal pain for past 1 day.  Endorsing suicidal ideation because "he drinks too much." endorsing last drink at 3 or 4 AM today, endorsing drinking alcohol everyday.  Asking for medication to kill him.  Notes epigastric abdominal pain, nausea and multiple episodes of emesis.  Denies falls.  Denies chest pain/sob, fever/chills. Not willing to list daily ETOH intake, had normal CT of abdomen 7/05/23.  (12 Jul 2023 17:51)      CENTRAL LINE: [ ] YES [ ] NO  LOCATION:   DATE INSERTED:  REMOVE: [ ] YES [ ] NO  EXPLAIN:    CHAN: [ ] YES [ ] NO    DATE INSERTED:  REMOVE:  [ ] YES [ ] NO  EXPLAIN:    A-LINE:  [ ] YES [ ] NO  LOCATION:   DATE INSERTED:  REMOVE:  [ ] YES [ ] NO  EXPLAIN:    PAST MEDICAL & SURGICAL HISTORY:  PUD (peptic ulcer disease)      Mixed hyperlipidemia      EtOH dependence      Gastritis      Substance abuse      DTs (delirium tremens)      No significant past surgical history          REVIEW OF SYSTEMS:    Negative ROS aside from HPI/Interval events above.    ICU Vital Signs Last 24 Hrs  T(C): 37.1 (19 Jul 2023 08:00), Max: 37.1 (19 Jul 2023 08:00)  T(F): 98.7 (19 Jul 2023 08:00), Max: 98.7 (19 Jul 2023 08:00)  HR: 66 (19 Jul 2023 09:00) (44 - 83)  BP: 127/80 (19 Jul 2023 09:00) (109/73 - 178/96)  BP(mean): 91 (19 Jul 2023 09:00) (83 - 114)  ABP: --  ABP(mean): --  RR: 16 (19 Jul 2023 09:00) (11 - 20)  SpO2: 98% (19 Jul 2023 09:00) (86% - 100%)    O2 Parameters below as of 19 Jul 2023 04:00  Patient On (Oxygen Delivery Method): nasal cannula  O2 Flow (L/min): 2          ABG - ( 17 Jul 2023 22:09 )  pH, Arterial: 7.39  pH, Blood: x     /  pCO2: 41    /  pO2: 220   / HCO3: 25    / Base Excess: -0.2  /  SaO2: 99.5                I&O's Detail    18 Jul 2023 07:01  -  19 Jul 2023 07:00  --------------------------------------------------------  IN:    Dexmedetomidine: 325.2 mL    dextrose 5% + lactated ringers: 1725 mL    IV PiggyBack: 100 mL    IV PiggyBack: 200 mL  Total IN: 2350.2 mL    OUT:    Indwelling Catheter - Urethral (mL): 2850 mL  Total OUT: 2850 mL    Total NET: -499.8 mL      19 Jul 2023 07:01  -  19 Jul 2023 10:20  --------------------------------------------------------  IN:    Dexmedetomidine: 20.7 mL    dextrose 5% + lactated ringers: 75 mL    IV PiggyBack: 100 mL  Total IN: 195.7 mL    OUT:    Indwelling Catheter - Urethral (mL): 75 mL  Total OUT: 75 mL    Total NET: 120.7 mL            CAPILLARY BLOOD GLUCOSE      POCT Blood Glucose.: 104 mg/dL (19 Jul 2023 05:02)  POCT Blood Glucose.: 104 mg/dL (18 Jul 2023 23:44)  POCT Blood Glucose.: 117 mg/dL (18 Jul 2023 17:11)  POCT Blood Glucose.: 113 mg/dL (18 Jul 2023 11:11)        PHYSICAL EXAM:    calm. sedated. currently on precedex  RRR  cta b/l  soft nt nd  no edema      LABS:                        12.5   4.31  )-----------( 184      ( 19 Jul 2023 03:55 )             41.3      07-19    141  |  109<H>  |  7   ----------------------------<  104<H>  3.2<L>   |  25  |  0.91    Ca    9.2      19 Jul 2023 03:55  Phos  2.5     07-19  Mg     2.0     07-19    TPro  7.8  /  Alb  3.5  /  TBili  0.6  /  DBili  x   /  AST  42<H>  /  ALT  54  /  AlkPhos  62  07-19      Urinalysis Basic - ( 19 Jul 2023 03:55 )    Color: x / Appearance: x / SG: x / pH: x  Gluc: 104 mg/dL / Ketone: x  / Bili: x / Urobili: x   Blood: x / Protein: x / Nitrite: x   Leuk Esterase: x / RBC: x / WBC x   Sq Epi: x / Non Sq Epi: x / Bacteria: x          RADIOLOGY & ADDITIONAL STUDIES:      CRITICAL CARE TIME SPENT: 40 minutes

## 2023-07-19 NOTE — PROGRESS NOTE ADULT - ASSESSMENT
57 y/o male PMH of alcohol abuse with hx of DTs requiring frequent ICU admissions for phenobarb and precedex presented with abdominal pain on 7/12 with etoh level of 318. Admitted for etoh intox. Pt now with worsening DTs requiring ICU admission for further management.    c/w precedex.   prn phenobarb. Received 3x doses overnight.  thiamine/folate/mvi  aspiration precautions  CIWA scale  titrate down precedex prn.  normal diet if awake and tolerating and calm.  resp normal.  bradycardic but sinus and HD stable. likely sleeping with precedex effect. cont. monitoring.  dvt ppx  critically ill. high risk for decompensation.

## 2023-07-20 LAB
ALBUMIN SERPL ELPH-MCNC: 3.3 G/DL — SIGNIFICANT CHANGE UP (ref 3.3–5)
ALP SERPL-CCNC: 54 U/L — SIGNIFICANT CHANGE UP (ref 40–120)
ALT FLD-CCNC: 39 U/L — SIGNIFICANT CHANGE UP (ref 12–78)
ANION GAP SERPL CALC-SCNC: 7 MMOL/L — SIGNIFICANT CHANGE UP (ref 5–17)
AST SERPL-CCNC: 24 U/L — SIGNIFICANT CHANGE UP (ref 15–37)
BASOPHILS # BLD AUTO: 0.03 K/UL — SIGNIFICANT CHANGE UP (ref 0–0.2)
BASOPHILS NFR BLD AUTO: 0.6 % — SIGNIFICANT CHANGE UP (ref 0–2)
BILIRUB SERPL-MCNC: 0.6 MG/DL — SIGNIFICANT CHANGE UP (ref 0.2–1.2)
BUN SERPL-MCNC: 9 MG/DL — SIGNIFICANT CHANGE UP (ref 7–23)
CALCIUM SERPL-MCNC: 8.9 MG/DL — SIGNIFICANT CHANGE UP (ref 8.5–10.1)
CHLORIDE SERPL-SCNC: 110 MMOL/L — HIGH (ref 96–108)
CO2 SERPL-SCNC: 23 MMOL/L — SIGNIFICANT CHANGE UP (ref 22–31)
CREAT SERPL-MCNC: 0.89 MG/DL — SIGNIFICANT CHANGE UP (ref 0.5–1.3)
EGFR: 101 ML/MIN/1.73M2 — SIGNIFICANT CHANGE UP
EOSINOPHIL # BLD AUTO: 0.1 K/UL — SIGNIFICANT CHANGE UP (ref 0–0.5)
EOSINOPHIL NFR BLD AUTO: 2 % — SIGNIFICANT CHANGE UP (ref 0–6)
GLUCOSE BLDC GLUCOMTR-MCNC: 102 MG/DL — HIGH (ref 70–99)
GLUCOSE BLDC GLUCOMTR-MCNC: 103 MG/DL — HIGH (ref 70–99)
GLUCOSE BLDC GLUCOMTR-MCNC: 105 MG/DL — HIGH (ref 70–99)
GLUCOSE BLDC GLUCOMTR-MCNC: 112 MG/DL — HIGH (ref 70–99)
GLUCOSE SERPL-MCNC: 119 MG/DL — HIGH (ref 70–99)
HCT VFR BLD CALC: 37.7 % — LOW (ref 39–50)
HGB BLD-MCNC: 11.5 G/DL — LOW (ref 13–17)
IMM GRANULOCYTES NFR BLD AUTO: 0.6 % — SIGNIFICANT CHANGE UP (ref 0–0.9)
LYMPHOCYTES # BLD AUTO: 1.12 K/UL — SIGNIFICANT CHANGE UP (ref 1–3.3)
LYMPHOCYTES # BLD AUTO: 21.9 % — SIGNIFICANT CHANGE UP (ref 13–44)
MAGNESIUM SERPL-MCNC: 2 MG/DL — SIGNIFICANT CHANGE UP (ref 1.6–2.6)
MCHC RBC-ENTMCNC: 23.3 PG — LOW (ref 27–34)
MCHC RBC-ENTMCNC: 30.5 G/DL — LOW (ref 32–36)
MCV RBC AUTO: 76.5 FL — LOW (ref 80–100)
MONOCYTES # BLD AUTO: 0.86 K/UL — SIGNIFICANT CHANGE UP (ref 0–0.9)
MONOCYTES NFR BLD AUTO: 16.8 % — HIGH (ref 2–14)
NEUTROPHILS # BLD AUTO: 2.97 K/UL — SIGNIFICANT CHANGE UP (ref 1.8–7.4)
NEUTROPHILS NFR BLD AUTO: 58.1 % — SIGNIFICANT CHANGE UP (ref 43–77)
NRBC # BLD: 0 /100 WBCS — SIGNIFICANT CHANGE UP (ref 0–0)
PHOSPHATE SERPL-MCNC: 2.9 MG/DL — SIGNIFICANT CHANGE UP (ref 2.5–4.5)
PLATELET # BLD AUTO: 192 K/UL — SIGNIFICANT CHANGE UP (ref 150–400)
POTASSIUM SERPL-MCNC: 3.1 MMOL/L — LOW (ref 3.5–5.3)
POTASSIUM SERPL-SCNC: 3.1 MMOL/L — LOW (ref 3.5–5.3)
PROT SERPL-MCNC: 7.6 GM/DL — SIGNIFICANT CHANGE UP (ref 6–8.3)
RBC # BLD: 4.93 M/UL — SIGNIFICANT CHANGE UP (ref 4.2–5.8)
RBC # FLD: 22.9 % — HIGH (ref 10.3–14.5)
SODIUM SERPL-SCNC: 140 MMOL/L — SIGNIFICANT CHANGE UP (ref 135–145)
WBC # BLD: 5.11 K/UL — SIGNIFICANT CHANGE UP (ref 3.8–10.5)
WBC # FLD AUTO: 5.11 K/UL — SIGNIFICANT CHANGE UP (ref 3.8–10.5)

## 2023-07-20 PROCEDURE — 99291 CRITICAL CARE FIRST HOUR: CPT

## 2023-07-20 RX ORDER — POTASSIUM CHLORIDE 20 MEQ
10 PACKET (EA) ORAL
Refills: 0 | Status: COMPLETED | OUTPATIENT
Start: 2023-07-20 | End: 2023-07-20

## 2023-07-20 RX ORDER — PHENOBARBITAL 60 MG
130 TABLET ORAL EVERY 6 HOURS
Refills: 0 | Status: DISCONTINUED | OUTPATIENT
Start: 2023-07-20 | End: 2023-07-23

## 2023-07-20 RX ADMIN — CHLORHEXIDINE GLUCONATE 1 APPLICATION(S): 213 SOLUTION TOPICAL at 11:24

## 2023-07-20 RX ADMIN — Medication 1 TABLET(S): at 11:24

## 2023-07-20 RX ADMIN — Medication 100 MILLIEQUIVALENT(S): at 05:39

## 2023-07-20 RX ADMIN — Medication 130 MILLIGRAM(S): at 22:55

## 2023-07-20 RX ADMIN — Medication 100 MILLIEQUIVALENT(S): at 05:15

## 2023-07-20 RX ADMIN — PANTOPRAZOLE SODIUM 40 MILLIGRAM(S): 20 TABLET, DELAYED RELEASE ORAL at 11:23

## 2023-07-20 RX ADMIN — Medication 130 MILLIGRAM(S): at 17:14

## 2023-07-20 RX ADMIN — Medication 130 MILLIGRAM(S): at 07:48

## 2023-07-20 RX ADMIN — Medication 130 MILLIGRAM(S): at 11:25

## 2023-07-20 RX ADMIN — SODIUM CHLORIDE 75 MILLILITER(S): 9 INJECTION, SOLUTION INTRAVENOUS at 14:40

## 2023-07-20 RX ADMIN — ENOXAPARIN SODIUM 40 MILLIGRAM(S): 100 INJECTION SUBCUTANEOUS at 15:47

## 2023-07-20 RX ADMIN — Medication 100 MILLIGRAM(S): at 11:24

## 2023-07-20 RX ADMIN — Medication 100 MILLIEQUIVALENT(S): at 07:14

## 2023-07-20 RX ADMIN — DEXMEDETOMIDINE HYDROCHLORIDE IN 0.9% SODIUM CHLORIDE 6.31 MICROGRAM(S)/KG/HR: 4 INJECTION INTRAVENOUS at 12:14

## 2023-07-20 RX ADMIN — SODIUM CHLORIDE 75 MILLILITER(S): 9 INJECTION, SOLUTION INTRAVENOUS at 06:01

## 2023-07-20 RX ADMIN — Medication 130 MILLIGRAM(S): at 01:45

## 2023-07-20 RX ADMIN — DEXMEDETOMIDINE HYDROCHLORIDE IN 0.9% SODIUM CHLORIDE 6.31 MICROGRAM(S)/KG/HR: 4 INJECTION INTRAVENOUS at 08:26

## 2023-07-20 RX ADMIN — Medication 1 MILLIGRAM(S): at 11:25

## 2023-07-20 RX ADMIN — DEXMEDETOMIDINE HYDROCHLORIDE IN 0.9% SODIUM CHLORIDE 6.31 MICROGRAM(S)/KG/HR: 4 INJECTION INTRAVENOUS at 06:01

## 2023-07-20 NOTE — PROGRESS NOTE ADULT - SUBJECTIVE AND OBJECTIVE BOX
INTERVAL HPI/OVERNIGHT EVENTS:   HPI:  57 y/o male PMH of alcohol withdrawals, SI presents with abdominal pain for past 1 day.  Endorsing suicidal ideation because "he drinks too much." endorsing last drink at 3 or 4 AM today, endorsing drinking alcohol everyday.  Asking for medication to kill him.  Notes epigastric abdominal pain, nausea and multiple episodes of emesis.  Denies falls.  Denies chest pain/sob, fever/chills. Not willing to list daily ETOH intake, had normal CT of abdomen 7/05/23.  (12 Jul 2023 17:51)      CENTRAL LINE: [ ] YES [ ] NO  LOCATION:   DATE INSERTED:  REMOVE: [ ] YES [ ] NO  EXPLAIN:    CHAN: [ ] YES [ ] NO    DATE INSERTED:  REMOVE:  [ ] YES [ ] NO  EXPLAIN:    A-LINE:  [ ] YES [ ] NO  LOCATION:   DATE INSERTED:  REMOVE:  [ ] YES [ ] NO  EXPLAIN:    PAST MEDICAL & SURGICAL HISTORY:  PUD (peptic ulcer disease)      Mixed hyperlipidemia      EtOH dependence      Gastritis      Substance abuse      DTs (delirium tremens)      No significant past surgical history          REVIEW OF SYSTEMS:    Negative ROS aside from HPI/Interval events above.    ICU Vital Signs Last 24 Hrs  T(C): 36 (20 Jul 2023 07:09), Max: 37.1 (19 Jul 2023 16:00)  T(F): 96.8 (20 Jul 2023 07:09), Max: 98.8 (19 Jul 2023 16:00)  HR: 72 (20 Jul 2023 10:00) (47 - 88)  BP: 105/87 (20 Jul 2023 10:00) (96/53 - 168/80)  BP(mean): 91 (20 Jul 2023 10:00) (62 - 111)  ABP: --  ABP(mean): --  RR: 17 (20 Jul 2023 10:00) (12 - 23)  SpO2: 97% (20 Jul 2023 10:00) (97% - 100%)    O2 Parameters below as of 19 Jul 2023 17:00      O2 Concentration (%): 27            I&O's Detail    19 Jul 2023 07:01  -  20 Jul 2023 07:00  --------------------------------------------------------  IN:    Dexmedetomidine: 500.1 mL    dextrose 5% + lactated ringers: 1801 mL    IV PiggyBack: 300 mL  Total IN: 2601.1 mL    OUT:    Indwelling Catheter - Urethral (mL): 2105 mL  Total OUT: 2105 mL    Total NET: 496.1 mL      20 Jul 2023 07:01  -  20 Jul 2023 10:25  --------------------------------------------------------  IN:    Dexmedetomidine: 20 mL    dextrose 5% + lactated ringers: 225 mL    IV PiggyBack: 100 mL  Total IN: 345 mL    OUT:    Indwelling Catheter - Urethral (mL): 50 mL  Total OUT: 50 mL    Total NET: 295 mL            CAPILLARY BLOOD GLUCOSE      POCT Blood Glucose.: 112 mg/dL (20 Jul 2023 05:12)  POCT Blood Glucose.: 99 mg/dL (19 Jul 2023 23:06)  POCT Blood Glucose.: 99 mg/dL (19 Jul 2023 17:13)  POCT Blood Glucose.: 99 mg/dL (19 Jul 2023 11:00)        PHYSICAL EXAM:    calm. sedated. currently on precedex  RRR  cta b/l  soft nt nd  no edema      LABS:                        11.5   5.11  )-----------( 192      ( 20 Jul 2023 04:00 )             37.7      07-20    140  |  110<H>  |  9   ----------------------------<  119<H>  3.1<L>   |  23  |  0.89    Ca    8.9      20 Jul 2023 04:00  Phos  2.9     07-20  Mg     2.0     07-20    TPro  7.6  /  Alb  3.3  /  TBili  0.6  /  DBili  x   /  AST  24  /  ALT  39  /  AlkPhos  54  07-20      Urinalysis Basic - ( 20 Jul 2023 04:00 )    Color: x / Appearance: x / SG: x / pH: x  Gluc: 119 mg/dL / Ketone: x  / Bili: x / Urobili: x   Blood: x / Protein: x / Nitrite: x   Leuk Esterase: x / RBC: x / WBC x   Sq Epi: x / Non Sq Epi: x / Bacteria: x          RADIOLOGY & ADDITIONAL STUDIES:      CRITICAL CARE TIME SPENT: 40 minutes

## 2023-07-20 NOTE — PROGRESS NOTE ADULT - ASSESSMENT
59 y/o male PMH of alcohol abuse with hx of DTs requiring frequent ICU admissions for phenobarb and precedex presented with abdominal pain on 7/12 with etoh level of 318. Admitted for etoh intox. Pt now with worsening DTs requiring ICU admission for further management.    c/w precedex.   prn phenobarb. Received 2x doses overnight.  thiamine/folate/mvi  aspiration precautions  CIWA scale  titrate down precedex prn.  normal diet if awake and tolerating and calm.  resp normal.  bradycardic but sinus and HD stable. likely sleeping with precedex effect. cont. monitoring.  dvt ppx  critically ill. high risk for decompensation.

## 2023-07-21 LAB
ALBUMIN SERPL ELPH-MCNC: 3.1 G/DL — LOW (ref 3.3–5)
ALP SERPL-CCNC: 52 U/L — SIGNIFICANT CHANGE UP (ref 40–120)
ALT FLD-CCNC: 34 U/L — SIGNIFICANT CHANGE UP (ref 12–78)
ANION GAP SERPL CALC-SCNC: 6 MMOL/L — SIGNIFICANT CHANGE UP (ref 5–17)
AST SERPL-CCNC: 23 U/L — SIGNIFICANT CHANGE UP (ref 15–37)
BASOPHILS # BLD AUTO: 0.02 K/UL — SIGNIFICANT CHANGE UP (ref 0–0.2)
BASOPHILS NFR BLD AUTO: 0.6 % — SIGNIFICANT CHANGE UP (ref 0–2)
BILIRUB SERPL-MCNC: 0.4 MG/DL — SIGNIFICANT CHANGE UP (ref 0.2–1.2)
BUN SERPL-MCNC: 10 MG/DL — SIGNIFICANT CHANGE UP (ref 7–23)
CALCIUM SERPL-MCNC: 8.8 MG/DL — SIGNIFICANT CHANGE UP (ref 8.5–10.1)
CHLORIDE SERPL-SCNC: 108 MMOL/L — SIGNIFICANT CHANGE UP (ref 96–108)
CO2 SERPL-SCNC: 23 MMOL/L — SIGNIFICANT CHANGE UP (ref 22–31)
CREAT SERPL-MCNC: 0.98 MG/DL — SIGNIFICANT CHANGE UP (ref 0.5–1.3)
EGFR: 90 ML/MIN/1.73M2 — SIGNIFICANT CHANGE UP
EOSINOPHIL # BLD AUTO: 0.05 K/UL — SIGNIFICANT CHANGE UP (ref 0–0.5)
EOSINOPHIL NFR BLD AUTO: 1.6 % — SIGNIFICANT CHANGE UP (ref 0–6)
GLUCOSE BLDC GLUCOMTR-MCNC: 102 MG/DL — HIGH (ref 70–99)
GLUCOSE BLDC GLUCOMTR-MCNC: 107 MG/DL — HIGH (ref 70–99)
GLUCOSE BLDC GLUCOMTR-MCNC: 86 MG/DL — SIGNIFICANT CHANGE UP (ref 70–99)
GLUCOSE BLDC GLUCOMTR-MCNC: 86 MG/DL — SIGNIFICANT CHANGE UP (ref 70–99)
GLUCOSE SERPL-MCNC: 88 MG/DL — SIGNIFICANT CHANGE UP (ref 70–99)
HCT VFR BLD CALC: 35.8 % — LOW (ref 39–50)
HGB BLD-MCNC: 11.2 G/DL — LOW (ref 13–17)
IMM GRANULOCYTES NFR BLD AUTO: 0.3 % — SIGNIFICANT CHANGE UP (ref 0–0.9)
LYMPHOCYTES # BLD AUTO: 0.98 K/UL — LOW (ref 1–3.3)
LYMPHOCYTES # BLD AUTO: 31.3 % — SIGNIFICANT CHANGE UP (ref 13–44)
MAGNESIUM SERPL-MCNC: 1.8 MG/DL — SIGNIFICANT CHANGE UP (ref 1.6–2.6)
MCHC RBC-ENTMCNC: 23.8 PG — LOW (ref 27–34)
MCHC RBC-ENTMCNC: 31.3 G/DL — LOW (ref 32–36)
MCV RBC AUTO: 76 FL — LOW (ref 80–100)
MONOCYTES # BLD AUTO: 0.55 K/UL — SIGNIFICANT CHANGE UP (ref 0–0.9)
MONOCYTES NFR BLD AUTO: 17.6 % — HIGH (ref 2–14)
NEUTROPHILS # BLD AUTO: 1.52 K/UL — LOW (ref 1.8–7.4)
NEUTROPHILS NFR BLD AUTO: 48.6 % — SIGNIFICANT CHANGE UP (ref 43–77)
NRBC # BLD: 0 /100 WBCS — SIGNIFICANT CHANGE UP (ref 0–0)
PHOSPHATE SERPL-MCNC: 2.7 MG/DL — SIGNIFICANT CHANGE UP (ref 2.5–4.5)
PLATELET # BLD AUTO: 233 K/UL — SIGNIFICANT CHANGE UP (ref 150–400)
POTASSIUM SERPL-MCNC: 3.3 MMOL/L — LOW (ref 3.5–5.3)
POTASSIUM SERPL-SCNC: 3.3 MMOL/L — LOW (ref 3.5–5.3)
PROT SERPL-MCNC: 7.1 GM/DL — SIGNIFICANT CHANGE UP (ref 6–8.3)
RBC # BLD: 4.71 M/UL — SIGNIFICANT CHANGE UP (ref 4.2–5.8)
RBC # FLD: 22.6 % — HIGH (ref 10.3–14.5)
SODIUM SERPL-SCNC: 137 MMOL/L — SIGNIFICANT CHANGE UP (ref 135–145)
WBC # BLD: 3.13 K/UL — LOW (ref 3.8–10.5)
WBC # FLD AUTO: 3.13 K/UL — LOW (ref 3.8–10.5)

## 2023-07-21 PROCEDURE — 99291 CRITICAL CARE FIRST HOUR: CPT

## 2023-07-21 RX ORDER — FOLIC ACID 0.8 MG
1 TABLET ORAL DAILY
Refills: 0 | Status: DISCONTINUED | OUTPATIENT
Start: 2023-07-21 | End: 2023-08-02

## 2023-07-21 RX ORDER — POTASSIUM CHLORIDE 20 MEQ
40 PACKET (EA) ORAL EVERY 4 HOURS
Refills: 0 | Status: COMPLETED | OUTPATIENT
Start: 2023-07-21 | End: 2023-07-21

## 2023-07-21 RX ORDER — PHENOBARBITAL 60 MG
130 TABLET ORAL ONCE
Refills: 0 | Status: DISCONTINUED | OUTPATIENT
Start: 2023-07-21 | End: 2023-07-21

## 2023-07-21 RX ADMIN — Medication 40 MILLIEQUIVALENT(S): at 05:22

## 2023-07-21 RX ADMIN — Medication 130 MILLIGRAM(S): at 17:00

## 2023-07-21 RX ADMIN — DEXMEDETOMIDINE HYDROCHLORIDE IN 0.9% SODIUM CHLORIDE 6.31 MICROGRAM(S)/KG/HR: 4 INJECTION INTRAVENOUS at 17:00

## 2023-07-21 RX ADMIN — Medication 100 MILLIGRAM(S): at 11:09

## 2023-07-21 RX ADMIN — Medication 130 MILLIGRAM(S): at 00:03

## 2023-07-21 RX ADMIN — DEXMEDETOMIDINE HYDROCHLORIDE IN 0.9% SODIUM CHLORIDE 6.31 MICROGRAM(S)/KG/HR: 4 INJECTION INTRAVENOUS at 20:14

## 2023-07-21 RX ADMIN — Medication 130 MILLIGRAM(S): at 07:52

## 2023-07-21 RX ADMIN — Medication 130 MILLIGRAM(S): at 08:40

## 2023-07-21 RX ADMIN — Medication 130 MILLIGRAM(S): at 11:08

## 2023-07-21 RX ADMIN — Medication 40 MILLIEQUIVALENT(S): at 08:55

## 2023-07-21 RX ADMIN — Medication 130 MILLIGRAM(S): at 20:14

## 2023-07-21 RX ADMIN — ENOXAPARIN SODIUM 40 MILLIGRAM(S): 100 INJECTION SUBCUTANEOUS at 17:00

## 2023-07-21 RX ADMIN — PANTOPRAZOLE SODIUM 40 MILLIGRAM(S): 20 TABLET, DELAYED RELEASE ORAL at 11:08

## 2023-07-21 RX ADMIN — Medication 130 MILLIGRAM(S): at 21:37

## 2023-07-21 RX ADMIN — Medication 130 MILLIGRAM(S): at 05:22

## 2023-07-21 NOTE — CHART NOTE - NSCHARTNOTEFT_GEN_A_CORE
Per chart pt with PMH: EtOH use, PUD, gastritis, admitted for alcohol withdrawal and SI. Pt sedated at time of visit.    Factors impacting intake: [ ] none [ ] nausea  [ ] vomiting [ ] diarrhea [ ] constipation  [ ]chewing problems [ ] swallowing issues  [x] other: CIWA/sedation    Diet Prescription: Diet, Soft and Bite Sized (07-20-23 @ 15:47) [Active]    Intake: pt with poor PO intake due to high CIWA score/sedation    Current Weight: Weight (kg): (07/21) 83.9kg (07/12) 84.1kg  % Weight Change: weight stable    Edema: 1+ generalized as per flow sheets    Physical Appearance: pt sedated- unable to conduct nutrition focused physical exam at time of visit    Pertinent Medications: MEDICATIONS  (STANDING):  chlorhexidine 2% Cloths 1 Application(s) Topical daily  dexMEDEtomidine Infusion 0.3 MICROgram(s)/kG/Hr (6.31 mL/Hr) IV Continuous <Continuous>  enoxaparin Injectable 40 milliGRAM(s) SubCutaneous every 24 hours  folic acid 1 milliGRAM(s) Oral daily  multivitamin 1 Tablet(s) Oral daily  pantoprazole  Injectable 40 milliGRAM(s) IV Push daily  PHENobarbital Injectable 130 milliGRAM(s) IV Push every 6 hours  thiamine Injectable 100 milliGRAM(s) IV Push daily    MEDICATIONS  (PRN):  PHENobarbital Injectable 130 milliGRAM(s) IV Push every 1 hour PRN agitation/withdrawal sx    Pertinent Labs: 07-21 Na137 mmol/L Glu 88 mg/dL K+ 3.3 mmol/L<L> Cr  0.98 mg/dL BUN 10 mg/dL 07-21 Phos 2.7 mg/dL 07-21 Alb 3.1 g/dL<L>    07-18-23 @ 04:10  A1C 5.3    Skin: no pressure injuries as per flow sheets     Estimated Needs:   [x] no change since previous assessment: 07/14  [ ] recalculated:     Previous Nutrition Diagnosis:   [x] 	Inadequate Energy Intake  Etiology	Inability to consume sufficient energy related to ETOH withdrawal, SI  Signs/Symptoms	PO intake mostly 26-50% x 2 days during admission  Goal/Expected Outcome	PO intake >75% for meals/supplement (not met)      Nutrition Diagnosis is [x] ongoing  [ ] resolved [ ] not applicable     New Nutrition Diagnosis: [x] not applicable      Interventions:   Recommend  [x] Continue current diet as ordered  [ ] Change Diet To:  [x] Nutrition Supplement: Add Ensure Plus High Protein x 2/day (provides 700 kcal, 40 g protein)   [ ] Nutrition Support  [x] Other: 1. Monitor and replete electrolytes as needed  2. When pt able to tolerate/safely consume food provide assistance and encouragement with PO intake     Monitoring and Evaluation:   [x] PO intake [ x ] Tolerance to diet prescription [ x ] weights [ x ] labs[ x ] follow up per protocol  [ ] other:

## 2023-07-21 NOTE — PROGRESS NOTE ADULT - SUBJECTIVE AND OBJECTIVE BOX
CHIEF COMPLAINT:    Interval Events:    REVIEW OF SYSTEMS:  CONSTITUTIONAL: No fever, chills, night sweats, or fatigue  EYES: No eye pain, visual disturbances, or discharge  ENMT:  No difficulty hearing, tinnitus, vertigo; No sinus or throat pain  NECK: No pain or stiffness  BREASTS: No pain, masses, or nipple discharge  RESPIRATORY: No cough, wheezing, or hemoptysis; No shortness of breath  CARDIOVASCULAR: No chest pain, palpitations, dizziness, or leg swelling  GASTROINTESTINAL: No abdominal or epigastric pain. No nausea, vomiting, or hematemesis; No diarrhea or constipation. No melena or hematochezia.  GENITOURINARY: No dysuria, frequency, hematuria, or incontinence  NEUROLOGICAL: No headaches, memory loss, loss of strength, numbness, or tremors  SKIN: No itching, burning, rashes, or lesions   LYMPH NODES: No enlarged glands  ENDOCRINE: No heat or cold intolerance; No hair loss  MUSCULOSKELETAL: No joint pain or swelling; No muscle, back, or extremity pain  PSYCHIATRIC: No depression, anxiety, mood swings, or difficulty sleeping  HEME/LYMPH: No easy bruising, or bleeding gums  ALLERY AND IMMUNOLOGIC: No hives or eczema    [ ] Unable to perform ROS due to ____      OBJECTIVE:  ICU Vital Signs Last 24 Hrs  T(C): 37.4 (21 Jul 2023 03:00), Max: 37.9 (20 Jul 2023 19:49)  T(F): 99.4 (21 Jul 2023 03:00), Max: 100.2 (20 Jul 2023 19:49)  HR: 58 (21 Jul 2023 14:00) (57 - 128)  BP: 107/70 (21 Jul 2023 14:00) (103/68 - 154/99)  BP(mean): 79 (21 Jul 2023 14:00) (77 - 134)  ABP: --  ABP(mean): --  RR: 13 (21 Jul 2023 14:00) (13 - 23)  SpO2: 97% (21 Jul 2023 14:00) (96% - 100%)          07-20 @ 07:01  -  07-21 @ 07:00  --------------------------------------------------------  IN: 1941.5 mL / OUT: 1170 mL / NET: 771.5 mL    07-21 @ 07:01  -  07-21 @ 15:01  --------------------------------------------------------  IN: 176.8 mL / OUT: 0 mL / NET: 176.8 mL      CAPILLARY BLOOD GLUCOSE      POCT Blood Glucose.: 107 mg/dL (21 Jul 2023 11:14)      PHYSICAL EXAM:  GENERAL: NAD, well-groomed, well-developed  HEAD:  Atraumatic, Normocephalic  EYES: EOMI, PERRLA, conjunctiva and sclera clear  ENMT: No tonsillar erythema, exudates, or enlargement; Moist mucous membranes, Good dentition, No lesions  NECK: Supple, No JVD, Normal thyroid  CHEST/LUNG: Clear to auscultation bilaterally; No rales, rhonchi, wheezing, or rubs  HEART: Regular rate and rhythm; No murmurs, rubs, or gallops  ABDOMEN: Soft, Nontender, Nondistended; Bowel sounds present  VASCULAR:  2+ Peripheral Pulses, No clubbing, cyanosis, or edema  LYMPH: No lymphadenopathy noted  SKIN: No rashes or lesions  NERVOUS SYSTEM:  Alert & Oriented X3, Good concentration; Motor Strength 5/5 B/L upper and lower extremities; DTRs 2+ intact and symmetric    LINES:    HOSPITAL MEDICATIONS:  enoxaparin Injectable 40 milliGRAM(s) SubCutaneous every 24 hours            dexMEDEtomidine Infusion 0.3 MICROgram(s)/kG/Hr IV Continuous <Continuous>  PHENobarbital Injectable 130 milliGRAM(s) IV Push every 1 hour PRN  PHENobarbital Injectable 130 milliGRAM(s) IV Push every 6 hours    pantoprazole  Injectable 40 milliGRAM(s) IV Push daily        folic acid 1 milliGRAM(s) Oral daily  multivitamin 1 Tablet(s) Oral daily  thiamine Injectable 100 milliGRAM(s) IV Push daily      chlorhexidine 2% Cloths 1 Application(s) Topical daily        LABS:                        11.2   3.13  )-----------( 233      ( 21 Jul 2023 02:55 )             35.8     Hgb Trend: 11.2<--, 11.5<--, 12.5<--, 10.7<--, 11.1<--  07-21    137  |  108  |  10  ----------------------------<  88  3.3<L>   |  23  |  0.98    Ca    8.8      21 Jul 2023 02:55  Phos  2.7     07-21  Mg     1.8     07-21    TPro  7.1  /  Alb  3.1<L>  /  TBili  0.4  /  DBili  x   /  AST  23  /  ALT  34  /  AlkPhos  52  07-21    Creatinine Trend: 0.98<--, 0.89<--, 0.91<--, 0.80<--, 1.08<--, 0.98<--    Urinalysis Basic - ( 21 Jul 2023 02:55 )    Color: x / Appearance: x / SG: x / pH: x  Gluc: 88 mg/dL / Ketone: x  / Bili: x / Urobili: x   Blood: x / Protein: x / Nitrite: x   Leuk Esterase: x / RBC: x / WBC x   Sq Epi: x / Non Sq Epi: x / Bacteria: x            MICROBIOLOGY:     RADIOLOGY:  [ ] Reviewed and interpreted by me    EKG: CHIEF COMPLAINT: EtOH withdrawal    Interval Events: On Precedex overnight     REVIEW OF SYSTEMS:  Unable to perform ROS due to AMS      OBJECTIVE:  ICU Vital Signs Last 24 Hrs  T(C): 37.4 (21 Jul 2023 03:00), Max: 37.9 (20 Jul 2023 19:49)  T(F): 99.4 (21 Jul 2023 03:00), Max: 100.2 (20 Jul 2023 19:49)  HR: 58 (21 Jul 2023 14:00) (57 - 128)  BP: 107/70 (21 Jul 2023 14:00) (103/68 - 154/99)  BP(mean): 79 (21 Jul 2023 14:00) (77 - 134)  ABP: --  ABP(mean): --  RR: 13 (21 Jul 2023 14:00) (13 - 23)  SpO2: 97% (21 Jul 2023 14:00) (96% - 100%)          07-20 @ 07:01  -  07-21 @ 07:00  --------------------------------------------------------  IN: 1941.5 mL / OUT: 1170 mL / NET: 771.5 mL    07-21 @ 07:01 - 07-21 @ 15:01  --------------------------------------------------------  IN: 176.8 mL / OUT: 0 mL / NET: 176.8 mL      CAPILLARY BLOOD GLUCOSE      POCT Blood Glucose.: 107 mg/dL (21 Jul 2023 11:14)      PHYSICAL EXAM:  GENERAL: NAD  EYES: EOMI, PERRLA, conjunctiva and sclera clear  ENMT: No tonsillar erythema, exudates, or enlargement; Moist mucous membranes, Good dentition, No lesions  NECK: Supple, No JVD, Normal thyroid  CHEST/LUNG: Clear to auscultation bilaterally; No rales, rhonchi, wheezing, or rubs  HEART: Regular rate and rhythm; No murmurs, rubs, or gallops  ABDOMEN: Soft, Nontender, Nondistended; Bowel sounds present  VASCULAR:  2+ Peripheral Pulses, No clubbing, cyanosis, or edema  LYMPH: No lymphadenopathy noted  SKIN: No rashes or lesions  NERVOUS SYSTEM:  Alert & Oriented X0    LINES:    HOSPITAL MEDICATIONS:  enoxaparin Injectable 40 milliGRAM(s) SubCutaneous every 24 hours            dexMEDEtomidine Infusion 0.3 MICROgram(s)/kG/Hr IV Continuous <Continuous>  PHENobarbital Injectable 130 milliGRAM(s) IV Push every 1 hour PRN  PHENobarbital Injectable 130 milliGRAM(s) IV Push every 6 hours    pantoprazole  Injectable 40 milliGRAM(s) IV Push daily        folic acid 1 milliGRAM(s) Oral daily  multivitamin 1 Tablet(s) Oral daily  thiamine Injectable 100 milliGRAM(s) IV Push daily      chlorhexidine 2% Cloths 1 Application(s) Topical daily        LABS:                        11.2   3.13  )-----------( 233      ( 21 Jul 2023 02:55 )             35.8     Hgb Trend: 11.2<--, 11.5<--, 12.5<--, 10.7<--, 11.1<--  07-21    137  |  108  |  10  ----------------------------<  88  3.3<L>   |  23  |  0.98    Ca    8.8      21 Jul 2023 02:55  Phos  2.7     07-21  Mg     1.8     07-21    TPro  7.1  /  Alb  3.1<L>  /  TBili  0.4  /  DBili  x   /  AST  23  /  ALT  34  /  AlkPhos  52  07-21    Creatinine Trend: 0.98<--, 0.89<--, 0.91<--, 0.80<--, 1.08<--, 0.98<--    Urinalysis Basic - ( 21 Jul 2023 02:55 )    Color: x / Appearance: x / SG: x / pH: x  Gluc: 88 mg/dL / Ketone: x  / Bili: x / Urobili: x   Blood: x / Protein: x / Nitrite: x   Leuk Esterase: x / RBC: x / WBC x   Sq Epi: x / Non Sq Epi: x / Bacteria: x            MICROBIOLOGY:     RADIOLOGY:  [ ] Reviewed and interpreted by me    EKG:

## 2023-07-21 NOTE — PROGRESS NOTE ADULT - ASSESSMENT
58 y.o. male with alcohol abuse with hx of DTs requiring frequent ICU admissions for phenobarb and precedex presented with abdominal pain on 7/12 with etoh level of 318. Admitted for etoh intox. Pt now with worsening DTs requiring ICU admission for further management.    Neuro   EtOH withdrawal   - Completed phenobarb load   - On phenobarb 130mg q6h   - Phenobarb 130mg PRN q1h  - Wean off Precedex gtt    - thiamine/folate/mvi  - aspiration precautions    Cardiovascular  - no issues    Pulmonary  - no issues     GI  - Diet order placed  - Protonix 40mg once a day      Renal   - No issues    ID  - No issues     Endo  - No Issues     Heme   - Lovenox 40mg qd     Ethics   - Full code

## 2023-07-22 LAB
ALBUMIN SERPL ELPH-MCNC: 3.4 G/DL — SIGNIFICANT CHANGE UP (ref 3.3–5)
ALP SERPL-CCNC: 49 U/L — SIGNIFICANT CHANGE UP (ref 40–120)
ALT FLD-CCNC: 34 U/L — SIGNIFICANT CHANGE UP (ref 12–78)
ANION GAP SERPL CALC-SCNC: 5 MMOL/L — SIGNIFICANT CHANGE UP (ref 5–17)
AST SERPL-CCNC: 21 U/L — SIGNIFICANT CHANGE UP (ref 15–37)
BASOPHILS # BLD AUTO: 0.04 K/UL — SIGNIFICANT CHANGE UP (ref 0–0.2)
BASOPHILS NFR BLD AUTO: 1.1 % — SIGNIFICANT CHANGE UP (ref 0–2)
BILIRUB SERPL-MCNC: 0.4 MG/DL — SIGNIFICANT CHANGE UP (ref 0.2–1.2)
BUN SERPL-MCNC: 12 MG/DL — SIGNIFICANT CHANGE UP (ref 7–23)
CALCIUM SERPL-MCNC: 9 MG/DL — SIGNIFICANT CHANGE UP (ref 8.5–10.1)
CHLORIDE SERPL-SCNC: 113 MMOL/L — HIGH (ref 96–108)
CO2 SERPL-SCNC: 25 MMOL/L — SIGNIFICANT CHANGE UP (ref 22–31)
CREAT SERPL-MCNC: 1.03 MG/DL — SIGNIFICANT CHANGE UP (ref 0.5–1.3)
EGFR: 85 ML/MIN/1.73M2 — SIGNIFICANT CHANGE UP
EOSINOPHIL # BLD AUTO: 0.11 K/UL — SIGNIFICANT CHANGE UP (ref 0–0.5)
EOSINOPHIL NFR BLD AUTO: 3 % — SIGNIFICANT CHANGE UP (ref 0–6)
GLUCOSE BLDC GLUCOMTR-MCNC: 90 MG/DL — SIGNIFICANT CHANGE UP (ref 70–99)
GLUCOSE SERPL-MCNC: 94 MG/DL — SIGNIFICANT CHANGE UP (ref 70–99)
HCT VFR BLD CALC: 35.3 % — LOW (ref 39–50)
HGB BLD-MCNC: 11.2 G/DL — LOW (ref 13–17)
IMM GRANULOCYTES NFR BLD AUTO: 0.5 % — SIGNIFICANT CHANGE UP (ref 0–0.9)
LYMPHOCYTES # BLD AUTO: 1.08 K/UL — SIGNIFICANT CHANGE UP (ref 1–3.3)
LYMPHOCYTES # BLD AUTO: 29 % — SIGNIFICANT CHANGE UP (ref 13–44)
MAGNESIUM SERPL-MCNC: 2.1 MG/DL — SIGNIFICANT CHANGE UP (ref 1.6–2.6)
MCHC RBC-ENTMCNC: 24.1 PG — LOW (ref 27–34)
MCHC RBC-ENTMCNC: 31.7 G/DL — LOW (ref 32–36)
MCV RBC AUTO: 76.1 FL — LOW (ref 80–100)
MONOCYTES # BLD AUTO: 0.53 K/UL — SIGNIFICANT CHANGE UP (ref 0–0.9)
MONOCYTES NFR BLD AUTO: 14.2 % — HIGH (ref 2–14)
NEUTROPHILS # BLD AUTO: 1.94 K/UL — SIGNIFICANT CHANGE UP (ref 1.8–7.4)
NEUTROPHILS NFR BLD AUTO: 52.2 % — SIGNIFICANT CHANGE UP (ref 43–77)
NRBC # BLD: 0 /100 WBCS — SIGNIFICANT CHANGE UP (ref 0–0)
PHOSPHATE SERPL-MCNC: 2.5 MG/DL — SIGNIFICANT CHANGE UP (ref 2.5–4.5)
PLATELET # BLD AUTO: 274 K/UL — SIGNIFICANT CHANGE UP (ref 150–400)
POTASSIUM SERPL-MCNC: 3.7 MMOL/L — SIGNIFICANT CHANGE UP (ref 3.5–5.3)
POTASSIUM SERPL-SCNC: 3.7 MMOL/L — SIGNIFICANT CHANGE UP (ref 3.5–5.3)
PROT SERPL-MCNC: 7.6 GM/DL — SIGNIFICANT CHANGE UP (ref 6–8.3)
RBC # BLD: 4.64 M/UL — SIGNIFICANT CHANGE UP (ref 4.2–5.8)
RBC # FLD: 22.6 % — HIGH (ref 10.3–14.5)
SODIUM SERPL-SCNC: 143 MMOL/L — SIGNIFICANT CHANGE UP (ref 135–145)
WBC # BLD: 3.72 K/UL — LOW (ref 3.8–10.5)
WBC # FLD AUTO: 3.72 K/UL — LOW (ref 3.8–10.5)

## 2023-07-22 PROCEDURE — 99291 CRITICAL CARE FIRST HOUR: CPT

## 2023-07-22 RX ORDER — POTASSIUM CHLORIDE 20 MEQ
10 PACKET (EA) ORAL
Refills: 0 | Status: COMPLETED | OUTPATIENT
Start: 2023-07-22 | End: 2023-07-22

## 2023-07-22 RX ORDER — POTASSIUM CHLORIDE 20 MEQ
40 PACKET (EA) ORAL ONCE
Refills: 0 | Status: DISCONTINUED | OUTPATIENT
Start: 2023-07-22 | End: 2023-07-22

## 2023-07-22 RX ADMIN — Medication 1 TABLET(S): at 11:32

## 2023-07-22 RX ADMIN — DEXMEDETOMIDINE HYDROCHLORIDE IN 0.9% SODIUM CHLORIDE 6.31 MICROGRAM(S)/KG/HR: 4 INJECTION INTRAVENOUS at 09:34

## 2023-07-22 RX ADMIN — DEXMEDETOMIDINE HYDROCHLORIDE IN 0.9% SODIUM CHLORIDE 6.31 MICROGRAM(S)/KG/HR: 4 INJECTION INTRAVENOUS at 15:40

## 2023-07-22 RX ADMIN — Medication 130 MILLIGRAM(S): at 01:38

## 2023-07-22 RX ADMIN — Medication 130 MILLIGRAM(S): at 20:13

## 2023-07-22 RX ADMIN — PANTOPRAZOLE SODIUM 40 MILLIGRAM(S): 20 TABLET, DELAYED RELEASE ORAL at 11:32

## 2023-07-22 RX ADMIN — Medication 130 MILLIGRAM(S): at 23:02

## 2023-07-22 RX ADMIN — Medication 130 MILLIGRAM(S): at 00:25

## 2023-07-22 RX ADMIN — Medication 130 MILLIGRAM(S): at 15:37

## 2023-07-22 RX ADMIN — Medication 130 MILLIGRAM(S): at 09:08

## 2023-07-22 RX ADMIN — Medication 130 MILLIGRAM(S): at 05:53

## 2023-07-22 RX ADMIN — DEXMEDETOMIDINE HYDROCHLORIDE IN 0.9% SODIUM CHLORIDE 6.31 MICROGRAM(S)/KG/HR: 4 INJECTION INTRAVENOUS at 21:36

## 2023-07-22 RX ADMIN — Medication 130 MILLIGRAM(S): at 17:31

## 2023-07-22 RX ADMIN — CHLORHEXIDINE GLUCONATE 1 APPLICATION(S): 213 SOLUTION TOPICAL at 12:35

## 2023-07-22 RX ADMIN — Medication 1 MILLIGRAM(S): at 11:31

## 2023-07-22 RX ADMIN — Medication 100 MILLIEQUIVALENT(S): at 06:15

## 2023-07-22 RX ADMIN — Medication 130 MILLIGRAM(S): at 03:52

## 2023-07-22 RX ADMIN — Medication 130 MILLIGRAM(S): at 11:32

## 2023-07-22 RX ADMIN — ENOXAPARIN SODIUM 40 MILLIGRAM(S): 100 INJECTION SUBCUTANEOUS at 16:39

## 2023-07-22 RX ADMIN — Medication 100 MILLIEQUIVALENT(S): at 07:58

## 2023-07-22 RX ADMIN — Medication 100 MILLIGRAM(S): at 13:35

## 2023-07-22 NOTE — DISCHARGE NOTE PROVIDER - NSDCQMAMI_CARD_ALL_CORE
"Tabatha Guidry (85 y.o. Female)       Date of Birth   1938    Social Security Number       Address   [address] [place] [address] [address] [place] [address]    Home Phone   [phone]    MRN   [id number]       Christian   Anglican    Marital Status                               Admission Date   7/21/23    Admission Type   Emergency    Admitting Provider   Aman Vaughn MD    Attending Provider   Aman Vaughn MD    Department, Room/Bed   [facility] EMERGENCY DEPARTMENT, 27/27       Discharge Date       Discharge Disposition       Discharge Destination                                 Attending Provider: Aman Vaughn MD    Allergies: Sulfa Antibiotics    Isolation: None   Infection: None   Code Status: CPR    Ht: 172.7 cm (68\")   Wt: 77.1 kg (170 lb)    Admission Cmt: None   Principal Problem: Bilateral leg pain [M79.604,M79.605]                   Active Insurance as of 7/21/2023       Primary Coverage       Payor Plan Insurance Group Employer/Plan Group    [facility] MEDICARE REPLACEMENT [facility] MEDICARE REPLACEMENT [id number]       Payor Plan Address Payor Plan Phone Number Payor Plan Fax Number Effective Dates    [address] [address]   1/1/2016 - None Entered    [facility] [id number]         Subscriber Name Subscriber Birth Date Member ID       TABATHA GUIDRY 1938 [id number]                     Emergency Contacts        (Rel.) Home Phone Work Phone Mobile Phone    Derrick Guidry (Son) [phone] -- [phone]    YunielbrieJosse hernandez (Son) [phone] -- [phone]                " No

## 2023-07-22 NOTE — PROGRESS NOTE ADULT - SUBJECTIVE AND OBJECTIVE BOX
CHIEF COMPLAINT: EtOH withdrawl    Interval Events: patient remains confused     REVIEW OF SYSTEMS:   Unable to perform ROS due to confusion       OBJECTIVE:  ICU Vital Signs Last 24 Hrs  T(C): 36.8 (22 Jul 2023 12:00), Max: 36.8 (22 Jul 2023 12:00)  T(F): 98.2 (22 Jul 2023 12:00), Max: 98.2 (22 Jul 2023 12:00)  HR: 65 (22 Jul 2023 14:00) (42 - 90)  BP: 88/61 (22 Jul 2023 13:00) (88/61 - 162/82)  BP(mean): 67 (22 Jul 2023 13:00) (64 - 103)  ABP: --  ABP(mean): --  RR: 14 (22 Jul 2023 14:00) (10 - 21)  SpO2: 92% (22 Jul 2023 14:00) (92% - 100%)          07-21 @ 07:01  -  07-22 @ 07:00  --------------------------------------------------------  IN: 459.6 mL / OUT: 1055 mL / NET: -595.4 mL    07-22 @ 07:01  -  07-22 @ 14:20  --------------------------------------------------------  IN: 378.1 mL / OUT: 210 mL / NET: 168.1 mL      CAPILLARY BLOOD GLUCOSE      POCT Blood Glucose.: 90 mg/dL (22 Jul 2023 05:48)      PHYSICAL EXAM:  GENERAL: NAD, well-groomed, well-developed  EYES: EOMI, PERRLA, conjunctiva and sclera clear  ENMT: No tonsillar erythema, exudates, or enlargement; Moist mucous membranes, Good dentition, No lesions  CHEST/LUNG: Clear to auscultation bilaterally; No rales, rhonchi, wheezing, or rubs  HEART: Regular rate and rhythm; No murmurs, rubs, or gallops  ABDOMEN: Soft, Nontender, Nondistended; Bowel sounds present  VASCULAR:  2+ Peripheral Pulses, No clubbing, cyanosis, or edema  LYMPH: No lymphadenopathy noted  SKIN: No rashes or lesions  NERVOUS SYSTEM:  Alert & Oriented X1    LINES:    HOSPITAL MEDICATIONS:  enoxaparin Injectable 40 milliGRAM(s) SubCutaneous every 24 hours  dexMEDEtomidine Infusion 0.3 MICROgram(s)/kG/Hr IV Continuous <Continuous>  PHENobarbital Injectable 130 milliGRAM(s) IV Push every 1 hour PRN  PHENobarbital Injectable 130 milliGRAM(s) IV Push every 6 hours  pantoprazole  Injectable 40 milliGRAM(s) IV Push daily  folic acid 1 milliGRAM(s) Oral daily  multivitamin 1 Tablet(s) Oral daily  thiamine Injectable 100 milliGRAM(s) IV Push daily  chlorhexidine 2% Cloths 1 Application(s) Topical daily        LABS:                        11.2   3.72  )-----------( 274      ( 22 Jul 2023 02:40 )             35.3     Hgb Trend: 11.2<--, 11.2<--, 11.5<--, 12.5<--, 10.7<--  07-22    143  |  113<H>  |  12  ----------------------------<  94  3.7   |  25  |  1.03    Ca    9.0      22 Jul 2023 02:40  Phos  2.5     07-22  Mg     2.1     07-22    TPro  7.6  /  Alb  3.4  /  TBili  0.4  /  DBili  x   /  AST  21  /  ALT  34  /  AlkPhos  49  07-22    Creatinine Trend: 1.03<--, 0.98<--, 0.89<--, 0.91<--, 0.80<--, 1.08<--    Urinalysis Basic - ( 22 Jul 2023 02:40 )    Color: x / Appearance: x / SG: x / pH: x  Gluc: 94 mg/dL / Ketone: x  / Bili: x / Urobili: x   Blood: x / Protein: x / Nitrite: x   Leuk Esterase: x / RBC: x / WBC x   Sq Epi: x / Non Sq Epi: x / Bacteria: x            MICROBIOLOGY:     RADIOLOGY:  [ ] Reviewed and interpreted by me    EKG:

## 2023-07-23 LAB
ANION GAP SERPL CALC-SCNC: 5 MMOL/L — SIGNIFICANT CHANGE UP (ref 5–17)
BUN SERPL-MCNC: 13 MG/DL — SIGNIFICANT CHANGE UP (ref 7–23)
CALCIUM SERPL-MCNC: 8.7 MG/DL — SIGNIFICANT CHANGE UP (ref 8.5–10.1)
CHLORIDE SERPL-SCNC: 112 MMOL/L — HIGH (ref 96–108)
CO2 SERPL-SCNC: 21 MMOL/L — LOW (ref 22–31)
CREAT SERPL-MCNC: 0.94 MG/DL — SIGNIFICANT CHANGE UP (ref 0.5–1.3)
EGFR: 95 ML/MIN/1.73M2 — SIGNIFICANT CHANGE UP
GLUCOSE SERPL-MCNC: 88 MG/DL — SIGNIFICANT CHANGE UP (ref 70–99)
HCT VFR BLD CALC: 37.1 % — LOW (ref 39–50)
HGB BLD-MCNC: 11.2 G/DL — LOW (ref 13–17)
MAGNESIUM SERPL-MCNC: 2.1 MG/DL — SIGNIFICANT CHANGE UP (ref 1.6–2.6)
MCHC RBC-ENTMCNC: 23.3 PG — LOW (ref 27–34)
MCHC RBC-ENTMCNC: 30.2 G/DL — LOW (ref 32–36)
MCV RBC AUTO: 77.3 FL — LOW (ref 80–100)
NRBC # BLD: 0 /100 WBCS — SIGNIFICANT CHANGE UP (ref 0–0)
PHOSPHATE SERPL-MCNC: 3.1 MG/DL — SIGNIFICANT CHANGE UP (ref 2.5–4.5)
PLATELET # BLD AUTO: 295 K/UL — SIGNIFICANT CHANGE UP (ref 150–400)
POTASSIUM SERPL-MCNC: 4.6 MMOL/L — SIGNIFICANT CHANGE UP (ref 3.5–5.3)
POTASSIUM SERPL-SCNC: 4.6 MMOL/L — SIGNIFICANT CHANGE UP (ref 3.5–5.3)
RBC # BLD: 4.8 M/UL — SIGNIFICANT CHANGE UP (ref 4.2–5.8)
RBC # FLD: 22.7 % — HIGH (ref 10.3–14.5)
SODIUM SERPL-SCNC: 138 MMOL/L — SIGNIFICANT CHANGE UP (ref 135–145)
WBC # BLD: 2.64 K/UL — LOW (ref 3.8–10.5)
WBC # FLD AUTO: 2.64 K/UL — LOW (ref 3.8–10.5)

## 2023-07-23 PROCEDURE — 99291 CRITICAL CARE FIRST HOUR: CPT

## 2023-07-23 RX ORDER — THIAMINE MONONITRATE (VIT B1) 100 MG
100 TABLET ORAL DAILY
Refills: 0 | Status: DISCONTINUED | OUTPATIENT
Start: 2023-07-23 | End: 2023-08-02

## 2023-07-23 RX ORDER — ACETAMINOPHEN 500 MG
1000 TABLET ORAL ONCE
Refills: 0 | Status: COMPLETED | OUTPATIENT
Start: 2023-07-23 | End: 2023-07-23

## 2023-07-23 RX ORDER — PANTOPRAZOLE SODIUM 20 MG/1
40 TABLET, DELAYED RELEASE ORAL
Refills: 0 | Status: DISCONTINUED | OUTPATIENT
Start: 2023-07-23 | End: 2023-08-02

## 2023-07-23 RX ORDER — HALOPERIDOL DECANOATE 100 MG/ML
5 INJECTION INTRAMUSCULAR EVERY 12 HOURS
Refills: 0 | Status: DISCONTINUED | OUTPATIENT
Start: 2023-07-23 | End: 2023-07-24

## 2023-07-23 RX ORDER — PHENOBARBITAL 60 MG
130 TABLET ORAL
Refills: 0 | Status: DISCONTINUED | OUTPATIENT
Start: 2023-07-23 | End: 2023-07-24

## 2023-07-23 RX ADMIN — Medication 130 MILLIGRAM(S): at 12:26

## 2023-07-23 RX ADMIN — Medication 130 MILLIGRAM(S): at 05:33

## 2023-07-23 RX ADMIN — Medication 130 MILLIGRAM(S): at 18:54

## 2023-07-23 RX ADMIN — Medication 100 MILLIGRAM(S): at 12:08

## 2023-07-23 RX ADMIN — Medication 400 MILLIGRAM(S): at 23:29

## 2023-07-23 RX ADMIN — Medication 130 MILLIGRAM(S): at 10:59

## 2023-07-23 RX ADMIN — Medication 1 TABLET(S): at 12:09

## 2023-07-23 RX ADMIN — Medication 130 MILLIGRAM(S): at 06:50

## 2023-07-23 RX ADMIN — ENOXAPARIN SODIUM 40 MILLIGRAM(S): 100 INJECTION SUBCUTANEOUS at 17:22

## 2023-07-23 RX ADMIN — Medication 130 MILLIGRAM(S): at 14:03

## 2023-07-23 RX ADMIN — HALOPERIDOL DECANOATE 5 MILLIGRAM(S): 100 INJECTION INTRAMUSCULAR at 21:07

## 2023-07-23 RX ADMIN — Medication 1 MILLIGRAM(S): at 12:09

## 2023-07-23 NOTE — PROGRESS NOTE ADULT - SUBJECTIVE AND OBJECTIVE BOX
CHIEF COMPLAINT: EtOH withdrawal    Interval Events: No events overnight. patient remained off Precedex Did not need Phenobarb IV     REVIEW OF SYSTEMS:  CONSTITUTIONAL: No fever, chills, night sweats, or fatigue  EYES: No eye pain, visual disturbances, or discharge  ENMT:  No difficulty hearing, tinnitus, vertigo; No sinus or throat pain  RESPIRATORY: No cough, wheezing, or hemoptysis; No shortness of breath  CARDIOVASCULAR: No chest pain, palpitations, dizziness, or leg swelling  GASTROINTESTINAL: No abdominal or epigastric pain. No nausea, vomiting, or hematemesis; No diarrhea or constipation. No melena or hematochezia.  GENITOURINARY: No dysuria, frequency, hematuria, or incontinence  NEUROLOGICAL: No headaches, memory loss, loss of strength, numbness, or tremors  SKIN: No itching, burning, rashes, or lesions   LYMPH NODES: No enlarged glands  ENDOCRINE: No heat or cold intolerance; No hair loss  MUSCULOSKELETAL: No joint pain or swelling; No muscle, back, or extremity pain  PSYCHIATRIC: No depression, anxiety, mood swings, or difficulty sleeping  HEME/LYMPH: No easy bruising, or bleeding gums  ALLERY AND IMMUNOLOGIC: No hives or eczema          OBJECTIVE:  ICU Vital Signs Last 24 Hrs  T(C): 36 (23 Jul 2023 07:08), Max: 36.8 (22 Jul 2023 12:00)  T(F): 96.8 (23 Jul 2023 07:08), Max: 98.3 (22 Jul 2023 15:00)  HR: 111 (23 Jul 2023 10:00) (50 - 111)  BP: 122/97 (23 Jul 2023 10:00) (86/53 - 126/75)  BP(mean): 103 (23 Jul 2023 10:00) (60 - 103)  ABP: --  ABP(mean): --  RR: 25 (23 Jul 2023 10:00) (11 - 25)  SpO2: 98% (23 Jul 2023 10:00) (92% - 100%)    O2 Parameters below as of 23 Jul 2023 07:08  Patient On (Oxygen Delivery Method): nasal cannula  O2 Flow (L/min): 2            07-22 @ 07:01  -  07-23 @ 07:00  --------------------------------------------------------  IN: 556.5 mL / OUT: 840 mL / NET: -283.5 mL    07-23 @ 07:01  -  07-23 @ 10:36  --------------------------------------------------------  IN: 2.1 mL / OUT: 20 mL / NET: -17.9 mL      CAPILLARY BLOOD GLUCOSE      POCT Blood Glucose.: 90 mg/dL (22 Jul 2023 05:48)      PHYSICAL EXAM:  GENERAL: NAD, well-groomed, well-developed  EYES: EOMI, PERRLA, conjunctiva and sclera clear  ENMT: No tonsillar erythema, exudates, or enlargement; Moist mucous membranes, Good dentition, No lesions  CHEST/LUNG: Clear to auscultation bilaterally; No rales, rhonchi, wheezing, or rubs  HEART: Regular rate and rhythm; No murmurs, rubs, or gallops  ABDOMEN: Soft, Nontender, Nondistended; Bowel sounds present  VASCULAR:  2+ Peripheral Pulses, No clubbing, cyanosis, or edema  LYMPH: No lymphadenopathy noted  SKIN: No rashes or lesions  NERVOUS SYSTEM:  Alert & Oriented X3, Good concentration    LINES:    HOSPITAL MEDICATIONS:  enoxaparin Injectable 40 milliGRAM(s) SubCutaneous every 24 hours  PHENobarbital Injectable 130 milliGRAM(s) IV Push every 1 hour PRN  pantoprazole    Tablet 40 milliGRAM(s) Oral before breakfast  folic acid 1 milliGRAM(s) Oral daily  multivitamin 1 Tablet(s) Oral daily  thiamine 100 milliGRAM(s) Oral daily  chlorhexidine 2% Cloths 1 Application(s) Topical daily        LABS:                        11.2   2.64  )-----------( 295      ( 23 Jul 2023 03:27 )             37.1     Hgb Trend: 11.2<--, 11.2<--, 11.2<--, 11.5<--, 12.5<--  07-23    138  |  112<H>  |  13  ----------------------------<  88  4.6   |  21<L>  |  0.94    Ca    8.7      23 Jul 2023 03:27  Phos  3.1     07-23  Mg     2.1     07-23    TPro  7.6  /  Alb  3.4  /  TBili  0.4  /  DBili  x   /  AST  21  /  ALT  34  /  AlkPhos  49  07-22    Creatinine Trend: 0.94<--, 1.03<--, 0.98<--, 0.89<--, 0.91<--, 0.80<--    Urinalysis Basic - ( 23 Jul 2023 03:27 )    Color: x / Appearance: x / SG: x / pH: x  Gluc: 88 mg/dL / Ketone: x  / Bili: x / Urobili: x   Blood: x / Protein: x / Nitrite: x   Leuk Esterase: x / RBC: x / WBC x   Sq Epi: x / Non Sq Epi: x / Bacteria: x            MICROBIOLOGY:     RADIOLOGY:  [ ] Reviewed and interpreted by me    EKG:

## 2023-07-23 NOTE — PROGRESS NOTE ADULT - ASSESSMENT
58 y.o. male with alcohol abuse with hx of DTs requiring frequent ICU admissions for phenobarb and precedex presented with abdominal pain on 7/12 with etoh level of 318. Admitted for etoh intox. Pt now with worsening DTs requiring ICU admission for further management.    Neuro   EtOH withdrawal   - Completed phenobarb load   - discontinue phenobarb 130mg q6h   - c/w Phenobarb 130mg PRN q1h  - off Precedex gtt    - thiamine/folate/mvi  - aspiration precautions    Cardiovascular  - no issues    Pulmonary  - no issues     GI  - Diet order placed  - Protonix 40mg once a day      Renal   - No issues    ID  - No issues     Endo  - No Issues     Heme   - Lovenox 40mg qd     Ethics   - Full code

## 2023-07-24 LAB
ALBUMIN SERPL ELPH-MCNC: 3.4 G/DL — SIGNIFICANT CHANGE UP (ref 3.3–5)
ALP SERPL-CCNC: 51 U/L — SIGNIFICANT CHANGE UP (ref 40–120)
ALT FLD-CCNC: 31 U/L — SIGNIFICANT CHANGE UP (ref 12–78)
ANION GAP SERPL CALC-SCNC: 7 MMOL/L — SIGNIFICANT CHANGE UP (ref 5–17)
APPEARANCE UR: CLEAR — SIGNIFICANT CHANGE UP
AST SERPL-CCNC: 23 U/L — SIGNIFICANT CHANGE UP (ref 15–37)
BACTERIA # UR AUTO: ABNORMAL
BILIRUB SERPL-MCNC: 0.3 MG/DL — SIGNIFICANT CHANGE UP (ref 0.2–1.2)
BILIRUB UR-MCNC: NEGATIVE — SIGNIFICANT CHANGE UP
BUN SERPL-MCNC: 16 MG/DL — SIGNIFICANT CHANGE UP (ref 7–23)
CALCIUM SERPL-MCNC: 9 MG/DL — SIGNIFICANT CHANGE UP (ref 8.5–10.1)
CHLORIDE SERPL-SCNC: 111 MMOL/L — HIGH (ref 96–108)
CO2 SERPL-SCNC: 23 MMOL/L — SIGNIFICANT CHANGE UP (ref 22–31)
COLOR SPEC: YELLOW — SIGNIFICANT CHANGE UP
COMMENT - URINE: SIGNIFICANT CHANGE UP
CREAT SERPL-MCNC: 0.92 MG/DL — SIGNIFICANT CHANGE UP (ref 0.5–1.3)
DIFF PNL FLD: ABNORMAL
EGFR: 98 ML/MIN/1.73M2 — SIGNIFICANT CHANGE UP
EPI CELLS # UR: SIGNIFICANT CHANGE UP
GLUCOSE SERPL-MCNC: 83 MG/DL — SIGNIFICANT CHANGE UP (ref 70–99)
GLUCOSE UR QL: NEGATIVE MG/DL — SIGNIFICANT CHANGE UP
HCT VFR BLD CALC: 36.6 % — LOW (ref 39–50)
HGB BLD-MCNC: 11.8 G/DL — LOW (ref 13–17)
KETONES UR-MCNC: ABNORMAL
LEUKOCYTE ESTERASE UR-ACNC: NEGATIVE — SIGNIFICANT CHANGE UP
MAGNESIUM SERPL-MCNC: 2.1 MG/DL — SIGNIFICANT CHANGE UP (ref 1.6–2.6)
MCHC RBC-ENTMCNC: 24.3 PG — LOW (ref 27–34)
MCHC RBC-ENTMCNC: 32.2 G/DL — SIGNIFICANT CHANGE UP (ref 32–36)
MCV RBC AUTO: 75.3 FL — LOW (ref 80–100)
NITRITE UR-MCNC: NEGATIVE — SIGNIFICANT CHANGE UP
NRBC # BLD: 0 /100 WBCS — SIGNIFICANT CHANGE UP (ref 0–0)
PH UR: 6 — SIGNIFICANT CHANGE UP (ref 5–8)
PHOSPHATE SERPL-MCNC: 3.3 MG/DL — SIGNIFICANT CHANGE UP (ref 2.5–4.5)
PLATELET # BLD AUTO: 335 K/UL — SIGNIFICANT CHANGE UP (ref 150–400)
POTASSIUM SERPL-MCNC: 3.3 MMOL/L — LOW (ref 3.5–5.3)
POTASSIUM SERPL-SCNC: 3.3 MMOL/L — LOW (ref 3.5–5.3)
PROT SERPL-MCNC: 7.8 GM/DL — SIGNIFICANT CHANGE UP (ref 6–8.3)
PROT UR-MCNC: 15 MG/DL
RBC # BLD: 4.86 M/UL — SIGNIFICANT CHANGE UP (ref 4.2–5.8)
RBC # FLD: 22.5 % — HIGH (ref 10.3–14.5)
RBC CASTS # UR COMP ASSIST: ABNORMAL /HPF (ref 0–4)
SODIUM SERPL-SCNC: 141 MMOL/L — SIGNIFICANT CHANGE UP (ref 135–145)
SP GR SPEC: 1.02 — SIGNIFICANT CHANGE UP (ref 1.01–1.02)
UROBILINOGEN FLD QL: NEGATIVE MG/DL — SIGNIFICANT CHANGE UP
WBC # BLD: 2.91 K/UL — LOW (ref 3.8–10.5)
WBC # FLD AUTO: 2.91 K/UL — LOW (ref 3.8–10.5)
WBC UR QL: SIGNIFICANT CHANGE UP

## 2023-07-24 PROCEDURE — 71045 X-RAY EXAM CHEST 1 VIEW: CPT | Mod: 26

## 2023-07-24 PROCEDURE — 99233 SBSQ HOSP IP/OBS HIGH 50: CPT

## 2023-07-24 RX ORDER — HALOPERIDOL DECANOATE 100 MG/ML
5 INJECTION INTRAMUSCULAR ONCE
Refills: 0 | Status: COMPLETED | OUTPATIENT
Start: 2023-07-24 | End: 2023-07-24

## 2023-07-24 RX ORDER — PHENOBARBITAL 60 MG
130 TABLET ORAL
Refills: 0 | Status: DISCONTINUED | OUTPATIENT
Start: 2023-07-24 | End: 2023-07-26

## 2023-07-24 RX ORDER — PHENOBARBITAL 60 MG
65 TABLET ORAL EVERY 12 HOURS
Refills: 0 | Status: DISCONTINUED | OUTPATIENT
Start: 2023-07-24 | End: 2023-07-28

## 2023-07-24 RX ORDER — POTASSIUM CHLORIDE 20 MEQ
10 PACKET (EA) ORAL
Refills: 0 | Status: COMPLETED | OUTPATIENT
Start: 2023-07-24 | End: 2023-07-24

## 2023-07-24 RX ADMIN — HALOPERIDOL DECANOATE 5 MILLIGRAM(S): 100 INJECTION INTRAMUSCULAR at 05:52

## 2023-07-24 RX ADMIN — Medication 1 MILLIGRAM(S): at 13:31

## 2023-07-24 RX ADMIN — CHLORHEXIDINE GLUCONATE 1 APPLICATION(S): 213 SOLUTION TOPICAL at 13:30

## 2023-07-24 RX ADMIN — Medication 100 MILLIGRAM(S): at 13:31

## 2023-07-24 RX ADMIN — Medication 100 MILLIEQUIVALENT(S): at 05:52

## 2023-07-24 RX ADMIN — ENOXAPARIN SODIUM 40 MILLIGRAM(S): 100 INJECTION SUBCUTANEOUS at 15:12

## 2023-07-24 RX ADMIN — HALOPERIDOL DECANOATE 5 MILLIGRAM(S): 100 INJECTION INTRAMUSCULAR at 22:02

## 2023-07-24 RX ADMIN — Medication 130 MILLIGRAM(S): at 21:04

## 2023-07-24 RX ADMIN — Medication 100 MILLIEQUIVALENT(S): at 06:48

## 2023-07-24 RX ADMIN — Medication 1 TABLET(S): at 13:31

## 2023-07-24 RX ADMIN — Medication 65 MILLIGRAM(S): at 17:29

## 2023-07-24 RX ADMIN — Medication 1000 MILLIGRAM(S): at 00:29

## 2023-07-24 RX ADMIN — Medication 130 MILLIGRAM(S): at 17:51

## 2023-07-24 RX ADMIN — Medication 100 MILLIEQUIVALENT(S): at 07:53

## 2023-07-24 NOTE — PROGRESS NOTE ADULT - SUBJECTIVE AND OBJECTIVE BOX
HPI:  Pt is a 57 yo M with h/o ETOH abuse with multiple hospitalizations/ ICU admissions 2 to ETOH withdrawal/DTs requiring Phenobarb + Precedex. Pt initially presented with abdominal pain on  with ETOH level of 318 and admitted for ETOH intox. On  pt transferred to the for worsening DTs. Pt s/p Phenobarb load and off Precedex since yesterday    ## Labs:  CBC:                        11.8   2.91  )-----------( 335      ( 2023 04:23 )             36.6     Chem:      141  |  111<H>  |  16  ----------------------------<  83  3.3<L>   |  23  |  0.92    Ca    9.0      2023 04:23  Phos  3.3       Mg     2.1         TPro  7.8  /  Alb  3.4  /  TBili  0.3  /  DBili  x   /  AST  23  /  ALT  31  /  AlkPhos  51      Coags:          ## Imaging:    ## Medications:          enoxaparin Injectable 40 milliGRAM(s) SubCutaneous every 24 hours    pantoprazole    Tablet 40 milliGRAM(s) Oral before breakfast    PHENobarbital Injectable 65 milliGRAM(s) IV Push every 12 hours  PHENobarbital Injectable 130 milliGRAM(s) IV Push every 2 hours PRN      ## Vitals:  T(C): 36.4 (23 @ 07:30), Max: 38.1 (23 @ 19:23)  HR: 69 (23 @ 12:00) (69 - 131)  BP: 109/74 (23 @ 12:00) (109/72 - 150/76)  BP(mean): 83 (23 @ 12:00) (80 - 113)  RR: 14 (23 @ 12:00) (11 - 23)  SpO2: 97% (23 @ 12:00) (96% - 100%)  Wt(kg): --  Vent:   AB-23 @ 07:01  -   @ 07:00  --------------------------------------------------------  IN: 427.1 mL / OUT: 590 mL / NET: -162.9 mL     @ 07:01   @ 14:25  --------------------------------------------------------  IN: 0 mL / OUT: 80 mL / NET: -80 mL          ## P/E:  Gen: lying comfortably in bed in no apparent distress  Lungs: CTA  Heart: RRR  Abd: Soft/+BS/ non-tender  Ext: No edema  Neuro: Currently sleeping and calm    CENTRAL LINE: [ ] YES [ ] NO  LOCATION:   DATE INSERTED:  REMOVE: [ ] YES [ ] NO      DUSTIN: [ ] YES [ ] NO    DATE INSERTED:  REMOVE:  [ ] YES [ ] NO      A-LINE:  [ ] YES [ ] NO  LOCATION:   DATE INSERTED:  REMOVE:  [ ] YES [ ] NO  EXPLAIN:    CODE STATUS: [x ] full code  [ ] DNR  [ ] DNI  [ ] RUST  Goals of care discussion: [ ] yes

## 2023-07-24 NOTE — PHYSICAL THERAPY INITIAL EVALUATION ADULT - ADDITIONAL COMMENTS
Pt lives is a house with 3 steps, c rail, at the entry of the house and no steps to negotiate at home.

## 2023-07-24 NOTE — CHART NOTE - NSCHARTNOTEFT_GEN_A_CORE
ICU DOWN GRADE NOTE  Accepting MD: DTs    Patient is a 58y old  Male who presents with a chief complaint of ETOH withdrawal. (2023 10:36)    HPI:  57 y/o male PMH of alcohol abuse with hx of DTs requiring frequent ICU admissions for phenobarb and precedex presented with abdominal pain on . Pt came to the ER with simillar complaints the week prior with CT abd neg and CTH neg on . Pt was admitted to the floor on  with etoh level of 318 for etoh intox. Pt drinks ~1L of vodka/day with last drink on . Pt was acting his nl self on the floor. Pt was started on standing benzos for prevention of etoh withdrawal.   Overnight, pt developed worsening DTs, requiring 2mg ativan q1hrs this AM. Pt recieved ~18mg ativan in last 24hrs.   RRT called for CIWA>20 again despite ativan therapy. PT initiated on phenobarb therapy 130mg q1hr prn. Pt recieved 390mg phenobarb and RRT called again for pt trying to get out of bed and being agitated. Pt accepted to ICU for further management of DTs.  Pt s/p phenobarb load   Has been off precedex drip and hasn't required any phenobarb prns recently     INTERVAL HPI/OVERNIGHT EVENTS: calm overnight. no phenobarb prns needed over last 12 hrs. restraints d/red       MEDICATIONS:  chlorhexidine 2% Cloths 1 Application(s) Topical daily  enoxaparin Injectable 40 milliGRAM(s) SubCutaneous every 24 hours  folic acid 1 milliGRAM(s) Oral daily  multivitamin 1 Tablet(s) Oral daily  pantoprazole    Tablet 40 milliGRAM(s) Oral before breakfast  PHENobarbital Injectable 65 milliGRAM(s) IV Push every 12 hours  PHENobarbital Injectable 130 milliGRAM(s) IV Push every 2 hours PRN  thiamine 100 milliGRAM(s) Oral daily      T(C): 36.4 (23 @ 07:30), Max: 38.1 (23 @ 19:23)  HR: 71 (23 @ 10:00) (70 - 131)  BP: 109/72 (23 @ 10:00) (109/72 - 150/76)  RR: 12 (23 @ 10:00) (11 - 23)  SpO2: 98% (23 @ 10:00) (93% - 100%)  Wt(kg): --Vital Signs Last 24 Hrs  T(C): 36.4 (2023 07:30), Max: 38.1 (2023 19:23)  T(F): 97.5 (2023 07:30), Max: 100.6 (2023 19:23)  HR: 71 (2023 10:00) (70 - 131)  BP: 109/72 (2023 10:00) (109/72 - 150/76)  BP(mean): 80 (2023 10:00) (80 - 113)  RR: 12 (2023 10:00) ( - )  SpO2: 98% (2023 10:00) (93% - 100%)    Parameters below as of 2023 07:30  Patient On (Oxygen Delivery Method): room air        PHYSICAL EXAM:  GENERAL: NAD, well-groomed, well-developed  EYES: EOMI, PERRLA, conjunctiva and sclera clear  CHEST/LUNG: Clear to auscultation bilaterally; No rales, rhonchi, wheezing, or rubs  HEART: Regular rate and rhythm; No murmurs, rubs, or gallops  ABDOMEN: Soft, Nontender, Nondistended; Bowel sounds present  VASCULAR:  2+ Peripheral Pulses, No clubbing, cyanosis, or edema  LYMPH: No lymphadenopathy noted  SKIN: No rashes or lesions  NERVOUS SYSTEM:  awake, redirectable and follows commands     Consultant(s) Notes Reviewed:  [x ] YES  [ ] NO  Care Discussed with Consultants/Other Providers [ x] YES  [ ] NO    LABS:                        11.8   2.91  )-----------( 335      ( 2023 04:23 )             36.6     07-24    141  |  111<H>  |  16  ----------------------------<  83  3.3<L>   |  23  |  0.92    Ca    9.0      2023 04:23  Phos  3.3     07-24  Mg     2.1     07-24    TPro  7.8  /  Alb  3.4  /  TBili  0.3  /  DBili  x   /  AST  23  /  ALT  31  /  AlkPhos  51  07-24      Urinalysis Basic - ( 2023 05:09 )    Color: Yellow / Appearance: Clear / S.020 / pH: x  Gluc: x / Ketone: Small  / Bili: Negative / Urobili: Negative mg/dL   Blood: x / Protein: 15 mg/dL / Nitrite: Negative   Leuk Esterase: Negative / RBC: 11-25 /HPF / WBC 0-2   Sq Epi: x / Non Sq Epi: x / Bacteria: Few      Urinalysis Basic - ( 2023 05:09 )    Color: Yellow / Appearance: Clear / S.020 / pH: x  Gluc: x / Ketone: Small  / Bili: Negative / Urobili: Negative mg/dL   Blood: x / Protein: 15 mg/dL / Nitrite: Negative   Leuk Esterase: Negative / RBC: 11-25 /HPF / WBC 0-2   Sq Epi: x / Non Sq Epi: x / Bacteria: Few        RADIOLOGY & ADDITIONAL TESTS:    Imaging Personally Reviewed:  [x ] YES  [ ] NO    58 y.o. male with alcohol abuse with hx of DTs requiring frequent ICU admissions for phenobarb and precedex presented with abdominal pain on  with etoh level of 318. Admitted for etoh intox. Pt now with worsening DTs requiring ICU admission for further management. Pt no longer requiring intensive therapy for DT management with improvement in sx, off sedative drips and HD stable for downgrade to regular medical floor.     To follow up:  - s/p phenobarb load for DTs. Now day 6 from active DTs. cont with phenobarb taper of 65mg BID x4 days with 130mg phenobarb prn q2hr  -cont thiamine, folate, Multivitamin    case and plan discussed with Dr Gamez and ICU DOWN GRADE NOTE  Accepting MD: Dr Ruiz     Patient is a 58y old  Male who presents with a chief complaint of ETOH withdrawal. (2023 10:36)    HPI:  59 y/o male PMH of alcohol abuse with hx of DTs requiring frequent ICU admissions for phenobarb and precedex presented with abdominal pain on . Pt came to the ER with simillar complaints the week prior with CT abd neg and CTH neg on . Pt was admitted to the floor on  with etoh level of 318 for etoh intox. Pt drinks ~1L of vodka/day with last drink on . Pt was acting his nl self on the floor. Pt was started on standing benzos for prevention of etoh withdrawal.   Overnight, pt developed worsening DTs, requiring 2mg ativan q1hrs this AM. Pt recieved ~18mg ativan in last 24hrs.   RRT called for CIWA>20 again despite ativan therapy. PT initiated on phenobarb therapy 130mg q1hr prn. Pt recieved 390mg phenobarb and RRT called again for pt trying to get out of bed and being agitated. Pt accepted to ICU for further management of DTs.  Pt s/p phenobarb load   Has been off precedex drip and hasn't required any phenobarb prns recently     INTERVAL HPI/OVERNIGHT EVENTS: calm overnight. no phenobarb prns needed over last 12 hrs. restraints d/red       MEDICATIONS:  chlorhexidine 2% Cloths 1 Application(s) Topical daily  enoxaparin Injectable 40 milliGRAM(s) SubCutaneous every 24 hours  folic acid 1 milliGRAM(s) Oral daily  multivitamin 1 Tablet(s) Oral daily  pantoprazole    Tablet 40 milliGRAM(s) Oral before breakfast  PHENobarbital Injectable 65 milliGRAM(s) IV Push every 12 hours  PHENobarbital Injectable 130 milliGRAM(s) IV Push every 2 hours PRN  thiamine 100 milliGRAM(s) Oral daily      T(C): 36.4 (23 @ 07:30), Max: 38.1 (23 @ 19:23)  HR: 71 (23 @ 10:00) (70 - 131)  BP: 109/72 (23 @ 10:00) (109/72 - 150/76)  RR: 12 (23 @ 10:00) (11 - 23)  SpO2: 98% (23 @ 10:00) (93% - 100%)  Wt(kg): --Vital Signs Last 24 Hrs  T(C): 36.4 (2023 07:30), Max: 38.1 (2023 19:23)  T(F): 97.5 (2023 07:30), Max: 100.6 (2023 19:23)  HR: 71 (2023 10:00) (70 - 131)  BP: 109/72 (2023 10:00) (109/72 - 150/76)  BP(mean): 80 (2023 10:00) (80 - 113)  RR: 12 (2023 10:00) ( - )  SpO2: 98% (2023 10:00) (93% - 100%)    Parameters below as of 2023 07:30  Patient On (Oxygen Delivery Method): room air        PHYSICAL EXAM:  GENERAL: NAD, well-groomed, well-developed  EYES: EOMI, PERRLA, conjunctiva and sclera clear  CHEST/LUNG: Clear to auscultation bilaterally; No rales, rhonchi, wheezing, or rubs  HEART: Regular rate and rhythm; No murmurs, rubs, or gallops  ABDOMEN: Soft, Nontender, Nondistended; Bowel sounds present  VASCULAR:  2+ Peripheral Pulses, No clubbing, cyanosis, or edema  LYMPH: No lymphadenopathy noted  SKIN: No rashes or lesions  NERVOUS SYSTEM:  awake, redirectable and follows commands     Consultant(s) Notes Reviewed:  [x ] YES  [ ] NO  Care Discussed with Consultants/Other Providers [ x] YES  [ ] NO    LABS:                        11.8   2.91  )-----------( 335      ( 2023 04:23 )             36.6     07-24    141  |  111<H>  |  16  ----------------------------<  83  3.3<L>   |  23  |  0.92    Ca    9.0      2023 04:23  Phos  3.3     07-24  Mg     2.1     07-24    TPro  7.8  /  Alb  3.4  /  TBili  0.3  /  DBili  x   /  AST  23  /  ALT  31  /  AlkPhos  51  07-24      Urinalysis Basic - ( 2023 05:09 )    Color: Yellow / Appearance: Clear / S.020 / pH: x  Gluc: x / Ketone: Small  / Bili: Negative / Urobili: Negative mg/dL   Blood: x / Protein: 15 mg/dL / Nitrite: Negative   Leuk Esterase: Negative / RBC: 11-25 /HPF / WBC 0-2   Sq Epi: x / Non Sq Epi: x / Bacteria: Few      Urinalysis Basic - ( 2023 05:09 )    Color: Yellow / Appearance: Clear / S.020 / pH: x  Gluc: x / Ketone: Small  / Bili: Negative / Urobili: Negative mg/dL   Blood: x / Protein: 15 mg/dL / Nitrite: Negative   Leuk Esterase: Negative / RBC: 11-25 /HPF / WBC 0-2   Sq Epi: x / Non Sq Epi: x / Bacteria: Few        RADIOLOGY & ADDITIONAL TESTS:    Imaging Personally Reviewed:  [x ] YES  [ ] NO    58 y.o. male with alcohol abuse with hx of DTs requiring frequent ICU admissions for phenobarb and precedex presented with abdominal pain on  with etoh level of 318. Admitted for etoh intox. Pt now with worsening DTs requiring ICU admission for further management. Pt no longer requiring intensive therapy for DT management with improvement in sx, off sedative drips and HD stable for downgrade to regular medical floor.     To follow up:  - s/p phenobarb load for DTs. Now day 6 from active DTs. cont with phenobarb taper of 65mg BID x4 days with 130mg phenobarb prn q2hr  -cont thiamine, folate, Multivitamin    case and plan discussed with Dr Gamez and Dr Ruiz

## 2023-07-24 NOTE — PROGRESS NOTE ADULT - ASSESSMENT
Pt is a 57 yo M with h/o ETOH abuse with multiple hospitalizations/ ICU admissions 2 to ETOH withdrawal/DTs requiring Phenobarb + Precedex. Pt initially presented with abdominal pain on 7/12 with ETOH level of 318 and admitted for ETOH intox. On 7/17 pt transferred to the for worsening DTs. Pt s/p Phenobarb load and off Precedex since yesterday    Resp: [facility]  Heme: DVT prophylaxis with Lovenox  FEN: Po diet as tolerated/ Daily Thiamine, MVI and Folate/ Replace K; pt hypokalemic  Neuro/Psych: Finish Phenobarb taper  Social: May transfer to F

## 2023-07-24 NOTE — PHYSICAL THERAPY INITIAL EVALUATION ADULT - PERTINENT HX OF CURRENT PROBLEM, REHAB EVAL
Pt is a 59 yo M with h/o ETOH abuse with multiple hospitalizations/ ICU admissions 2 to ETOH withdrawal/DTs requiring Phenobarb + Precedex. Pt initially presented with abdominal pain on 7/12 with ETOH level of 318 and admitted for ETOH intox. On 7/17 pt transferred to the for worsening DTs. Pt s/p Phenobarb load and off Precedex

## 2023-07-25 LAB
-  STAPHYLOCOCCUS EPIDERMIDIS, METHICILLIN RESISTANT: SIGNIFICANT CHANGE UP
ALBUMIN SERPL ELPH-MCNC: 3.5 G/DL — SIGNIFICANT CHANGE UP (ref 3.3–5)
ALP SERPL-CCNC: 56 U/L — SIGNIFICANT CHANGE UP (ref 40–120)
ALT FLD-CCNC: 27 U/L — SIGNIFICANT CHANGE UP (ref 12–78)
ANION GAP SERPL CALC-SCNC: 9 MMOL/L — SIGNIFICANT CHANGE UP (ref 5–17)
AST SERPL-CCNC: 25 U/L — SIGNIFICANT CHANGE UP (ref 15–37)
BASOPHILS # BLD AUTO: 0.05 K/UL — SIGNIFICANT CHANGE UP (ref 0–0.2)
BASOPHILS NFR BLD AUTO: 1.1 % — SIGNIFICANT CHANGE UP (ref 0–2)
BILIRUB SERPL-MCNC: 0.2 MG/DL — SIGNIFICANT CHANGE UP (ref 0.2–1.2)
BUN SERPL-MCNC: 11 MG/DL — SIGNIFICANT CHANGE UP (ref 7–23)
CALCIUM SERPL-MCNC: 9 MG/DL — SIGNIFICANT CHANGE UP (ref 8.5–10.1)
CHLORIDE SERPL-SCNC: 108 MMOL/L — SIGNIFICANT CHANGE UP (ref 96–108)
CO2 SERPL-SCNC: 21 MMOL/L — LOW (ref 22–31)
CREAT SERPL-MCNC: 0.9 MG/DL — SIGNIFICANT CHANGE UP (ref 0.5–1.3)
CULTURE RESULTS: SIGNIFICANT CHANGE UP
EGFR: 100 ML/MIN/1.73M2 — SIGNIFICANT CHANGE UP
EOSINOPHIL # BLD AUTO: 0.12 K/UL — SIGNIFICANT CHANGE UP (ref 0–0.5)
EOSINOPHIL NFR BLD AUTO: 2.6 % — SIGNIFICANT CHANGE UP (ref 0–6)
GLUCOSE SERPL-MCNC: 83 MG/DL — SIGNIFICANT CHANGE UP (ref 70–99)
GRAM STN FLD: SIGNIFICANT CHANGE UP
HCT VFR BLD CALC: 40.4 % — SIGNIFICANT CHANGE UP (ref 39–50)
HGB BLD-MCNC: 12.3 G/DL — LOW (ref 13–17)
IMM GRANULOCYTES NFR BLD AUTO: 0.4 % — SIGNIFICANT CHANGE UP (ref 0–0.9)
LYMPHOCYTES # BLD AUTO: 1.49 K/UL — SIGNIFICANT CHANGE UP (ref 1–3.3)
LYMPHOCYTES # BLD AUTO: 32.8 % — SIGNIFICANT CHANGE UP (ref 13–44)
MAGNESIUM SERPL-MCNC: 2.1 MG/DL — SIGNIFICANT CHANGE UP (ref 1.6–2.6)
MCHC RBC-ENTMCNC: 23.3 PG — LOW (ref 27–34)
MCHC RBC-ENTMCNC: 30.4 G/DL — LOW (ref 32–36)
MCV RBC AUTO: 76.4 FL — LOW (ref 80–100)
METHOD TYPE: SIGNIFICANT CHANGE UP
MONOCYTES # BLD AUTO: 0.38 K/UL — SIGNIFICANT CHANGE UP (ref 0–0.9)
MONOCYTES NFR BLD AUTO: 8.4 % — SIGNIFICANT CHANGE UP (ref 2–14)
NEUTROPHILS # BLD AUTO: 2.48 K/UL — SIGNIFICANT CHANGE UP (ref 1.8–7.4)
NEUTROPHILS NFR BLD AUTO: 54.7 % — SIGNIFICANT CHANGE UP (ref 43–77)
NRBC # BLD: 0 /100 WBCS — SIGNIFICANT CHANGE UP (ref 0–0)
ORGANISM # SPEC MICROSCOPIC CNT: SIGNIFICANT CHANGE UP
ORGANISM # SPEC MICROSCOPIC CNT: SIGNIFICANT CHANGE UP
PHOSPHATE SERPL-MCNC: 2.3 MG/DL — LOW (ref 2.5–4.5)
PLATELET # BLD AUTO: 371 K/UL — SIGNIFICANT CHANGE UP (ref 150–400)
POTASSIUM SERPL-MCNC: 3.8 MMOL/L — SIGNIFICANT CHANGE UP (ref 3.5–5.3)
POTASSIUM SERPL-SCNC: 3.8 MMOL/L — SIGNIFICANT CHANGE UP (ref 3.5–5.3)
PROT SERPL-MCNC: 8.1 GM/DL — SIGNIFICANT CHANGE UP (ref 6–8.3)
RBC # BLD: 5.29 M/UL — SIGNIFICANT CHANGE UP (ref 4.2–5.8)
RBC # FLD: 22.5 % — HIGH (ref 10.3–14.5)
SODIUM SERPL-SCNC: 138 MMOL/L — SIGNIFICANT CHANGE UP (ref 135–145)
SPECIMEN SOURCE: SIGNIFICANT CHANGE UP
WBC # BLD: 4.54 K/UL — SIGNIFICANT CHANGE UP (ref 3.8–10.5)
WBC # FLD AUTO: 4.54 K/UL — SIGNIFICANT CHANGE UP (ref 3.8–10.5)

## 2023-07-25 PROCEDURE — 99232 SBSQ HOSP IP/OBS MODERATE 35: CPT

## 2023-07-25 RX ORDER — POTASSIUM PHOSPHATE, MONOBASIC POTASSIUM PHOSPHATE, DIBASIC 236; 224 MG/ML; MG/ML
15 INJECTION, SOLUTION INTRAVENOUS ONCE
Refills: 0 | Status: COMPLETED | OUTPATIENT
Start: 2023-07-25 | End: 2023-07-25

## 2023-07-25 RX ADMIN — POTASSIUM PHOSPHATE, MONOBASIC POTASSIUM PHOSPHATE, DIBASIC 62.5 MILLIMOLE(S): 236; 224 INJECTION, SOLUTION INTRAVENOUS at 05:27

## 2023-07-25 RX ADMIN — Medication 65 MILLIGRAM(S): at 17:12

## 2023-07-25 RX ADMIN — Medication 1 MILLIGRAM(S): at 12:12

## 2023-07-25 RX ADMIN — ENOXAPARIN SODIUM 40 MILLIGRAM(S): 100 INJECTION SUBCUTANEOUS at 17:11

## 2023-07-25 RX ADMIN — Medication 65 MILLIGRAM(S): at 05:27

## 2023-07-25 RX ADMIN — Medication 1 TABLET(S): at 12:12

## 2023-07-25 RX ADMIN — Medication 100 MILLIGRAM(S): at 12:12

## 2023-07-25 RX ADMIN — CHLORHEXIDINE GLUCONATE 1 APPLICATION(S): 213 SOLUTION TOPICAL at 12:12

## 2023-07-25 NOTE — PROGRESS NOTE ADULT - ASSESSMENT
58 y.o. male with alcohol abuse with hx of DTs requiring frequent ICU admissions for phenobarb and precedex presented with abdominal pain on 7/12 with etoh level of 318. Admitted for etoh intox. Pt now with worsening DTs requiring ICU admission for further management. Pt no longer requiring intensive therapy for DT management with improvement in sx, off sedative drips and HD stable     - s/p phenobarb load for DTs. Now day 7 from active DTs.   cont with phenobarb taper of 65mg BID x4 days with 130mg phenobarb prn q2hr  -cont thiamine, folate, Multivitamin        frequent flier remains a code grey risk   promote alcohol cessation

## 2023-07-25 NOTE — PROGRESS NOTE ADULT - SUBJECTIVE AND OBJECTIVE BOX
HPI:  57 y/o male PMH of alcohol withdrawals, SI presents with abdominal pain for past 1 day.  Endorsing suicidal ideation because "he drinks too much." endorsing last drink at 3 or 4 AM today, endorsing drinking alcohol everyday.  Asking for medication to kill him.  Notes epigastric abdominal pain, nausea and multiple episodes of emesis.  Denies falls.  Denies chest pain/sob, fever/chills. Not willing to list daily ETOH intake, had normal CT of abdomen 7/05/23.  (12 Jul 2023 17:51)    Patient is a 58y old  Male who presents with a chief complaint of ETOH withdrawal. (24 Jul 2023 14:25)      INTERVAL HPI/OVERNIGHT EVENTS: transferred physically out of ICU still in alchohol withdrawl     MEDICATIONS  (STANDING):  chlorhexidine 2% Cloths 1 Application(s) Topical daily  enoxaparin Injectable 40 milliGRAM(s) SubCutaneous every 24 hours  folic acid 1 milliGRAM(s) Oral daily  multivitamin 1 Tablet(s) Oral daily  pantoprazole    Tablet 40 milliGRAM(s) Oral before breakfast  PHENobarbital Injectable 65 milliGRAM(s) IV Push every 12 hours  thiamine 100 milliGRAM(s) Oral daily    MEDICATIONS  (PRN):  PHENobarbital Injectable 130 milliGRAM(s) IV Push every 2 hours PRN agitation/DT symptoms      Allergies    No Known Allergies    Intolerances        REVIEW OF SYSTEMS:  unable to provide sommulant     Vital Signs Last 24 Hrs  T(C): 36.8 (25 Jul 2023 20:30), Max: 36.8 (25 Jul 2023 15:28)  T(F): 98.2 (25 Jul 2023 20:30), Max: 98.3 (25 Jul 2023 15:28)  HR: 86 (25 Jul 2023 20:30) (76 - 110)  BP: 136/78 (25 Jul 2023 20:30) (117/48 - 159/90)  BP(mean): 61 (25 Jul 2023 20:00) (61 - 110)  RR: 16 (25 Jul 2023 20:30) (10 - 17)  SpO2: 98% (25 Jul 2023 20:30) (97% - 100%)    Parameters below as of 25 Jul 2023 20:30  Patient On (Oxygen Delivery Method): room air        PHYSICAL EXAM:  GENERAL: NAD,  HEAD:  Atraumatic, Normocephalic  EYES: EOMI, PERRLA, conjunctiva and sclera clear  ENMT: No tonsillar erythema, exudates, or enlargement; Moist mucous membranes, Good dentition, No lesions  NECK: Supple, No JVD, Normal thyroid  NERVOUS SYSTEM:  asleep tremor  CHEST/LUNG: Clear to ascultation  bilaterally; No rales, rhonchi, wheezing, or rubs  HEART: Regular rate and rhythm; No murmurs, rubs, or gallops  ABDOMEN: Soft, Nontender, Nondistended; Bowel sounds present  EXTREMITIES:  2+ Peripheral Pulses, No clubbing, cyanosis, or edema  LYMPH: No lymphadenopathy noted  SKIN: No rashes or lesions    LABS:                        12.3   4.54  )-----------( 371      ( 25 Jul 2023 03:40 )             40.4     07-25    138  |  108  |  11  ----------------------------<  83  3.8   |  21<L>  |  0.90    Ca    9.0      25 Jul 2023 03:40  Phos  2.3     07-25  Mg     2.1     07-25    TPro  8.1  /  Alb  3.5  /  TBili  0.2  /  DBili  x   /  AST  25  /  ALT  27  /  AlkPhos  56  07-25      Urinalysis Basic - ( 25 Jul 2023 03:40 )    Color: x / Appearance: x / SG: x / pH: x  Gluc: 83 mg/dL / Ketone: x  / Bili: x / Urobili: x   Blood: x / Protein: x / Nitrite: x   Leuk Esterase: x / RBC: x / WBC x   Sq Epi: x / Non Sq Epi: x / Bacteria: x      CAPILLARY BLOOD GLUCOSE          RADIOLOGY & ADDITIONAL TESTS:  < from: Xray Chest 1 View- PORTABLE-Routine (Xray Chest 1 View- PORTABLE-Routine in AM.) (07.24.23 @ 10:37) >    ACC: [id number] EXAM:  XR CHEST PORTABLE ROUTINE 1V   ORDERED BY: LINUS ROBLERO     PROCEDURE DATE:  07/24/2023          INTERPRETATION:  EXAM: XR CHEST    INDICATION: fever FXR    COMPARISON: July 18 at 6:20 AM    FINDINGS: No focal infiltrate orcongestion. Heart is within normal   limits in its transthoracic diameter. Regional osseous structures   appropriate for age    IMPRESSION: Normal chest    < end of copied text >    Imaging Personally Reviewed:  [ X] YES  [ ] NO    Consultant(s) Notes Reviewed:  [X ] YES  [ ] NO  Culture - Blood (07.23.23 @ 21:45)    -  Staphylococcus epidermidis, Methicillin resistant: Detec   Gram Stain:   Growth in anaerobic bottle: Gram Positive Cocci in Clusters   Specimen Source: .Blood Blood   Organism: Blood Culture PCR   Culture Results:   Growth in anaerobic bottle: Staphylococcus epidermidis  Coagulase Negative Staphylococci isolated from a single blood culture set  may represent contamination.  Contact the Microbiology Department at [phone] if susceptibility  testing is clinically indicated.  Direct identification is available within approximately 3-5  hours either by Blood Panel Multiplexed PCR or Direct  MALDI-TOF. Details: [email or web address]   Organism Identification: Blood Culture PCR   Method Type: PCR      Care Discussed with Consultants/Other Providers [ X] YES  [ ] NO

## 2023-07-26 LAB
ANION GAP SERPL CALC-SCNC: 9 MMOL/L — SIGNIFICANT CHANGE UP (ref 5–17)
BUN SERPL-MCNC: 10 MG/DL — SIGNIFICANT CHANGE UP (ref 7–23)
CALCIUM SERPL-MCNC: 9.2 MG/DL — SIGNIFICANT CHANGE UP (ref 8.5–10.1)
CHLORIDE SERPL-SCNC: 107 MMOL/L — SIGNIFICANT CHANGE UP (ref 96–108)
CO2 SERPL-SCNC: 22 MMOL/L — SIGNIFICANT CHANGE UP (ref 22–31)
CREAT SERPL-MCNC: 0.84 MG/DL — SIGNIFICANT CHANGE UP (ref 0.5–1.3)
CULTURE RESULTS: SIGNIFICANT CHANGE UP
EGFR: 102 ML/MIN/1.73M2 — SIGNIFICANT CHANGE UP
GLUCOSE SERPL-MCNC: 73 MG/DL — SIGNIFICANT CHANGE UP (ref 70–99)
GRAM STN FLD: SIGNIFICANT CHANGE UP
HCT VFR BLD CALC: 43.3 % — SIGNIFICANT CHANGE UP (ref 39–50)
HGB BLD-MCNC: 13 G/DL — SIGNIFICANT CHANGE UP (ref 13–17)
MAGNESIUM SERPL-MCNC: 2.1 MG/DL — SIGNIFICANT CHANGE UP (ref 1.6–2.6)
MCHC RBC-ENTMCNC: 23.5 PG — LOW (ref 27–34)
MCHC RBC-ENTMCNC: 30 G/DL — LOW (ref 32–36)
MCV RBC AUTO: 78.3 FL — LOW (ref 80–100)
NRBC # BLD: 0 /100 WBCS — SIGNIFICANT CHANGE UP (ref 0–0)
PHOSPHATE SERPL-MCNC: 2.7 MG/DL — SIGNIFICANT CHANGE UP (ref 2.5–4.5)
PLATELET # BLD AUTO: 251 K/UL — SIGNIFICANT CHANGE UP (ref 150–400)
POTASSIUM SERPL-MCNC: 3.9 MMOL/L — SIGNIFICANT CHANGE UP (ref 3.5–5.3)
POTASSIUM SERPL-SCNC: 3.9 MMOL/L — SIGNIFICANT CHANGE UP (ref 3.5–5.3)
RBC # BLD: 5.53 M/UL — SIGNIFICANT CHANGE UP (ref 4.2–5.8)
RBC # FLD: 22.9 % — HIGH (ref 10.3–14.5)
SODIUM SERPL-SCNC: 138 MMOL/L — SIGNIFICANT CHANGE UP (ref 135–145)
SPECIMEN SOURCE: SIGNIFICANT CHANGE UP
WBC # BLD: 4.38 K/UL — SIGNIFICANT CHANGE UP (ref 3.8–10.5)
WBC # FLD AUTO: 4.38 K/UL — SIGNIFICANT CHANGE UP (ref 3.8–10.5)

## 2023-07-26 PROCEDURE — 99232 SBSQ HOSP IP/OBS MODERATE 35: CPT

## 2023-07-26 RX ORDER — PHENOBARBITAL 60 MG
130 TABLET ORAL
Refills: 0 | Status: DISCONTINUED | OUTPATIENT
Start: 2023-07-26 | End: 2023-07-27

## 2023-07-26 RX ADMIN — Medication 130 MILLIGRAM(S): at 10:46

## 2023-07-26 RX ADMIN — ENOXAPARIN SODIUM 40 MILLIGRAM(S): 100 INJECTION SUBCUTANEOUS at 17:48

## 2023-07-26 RX ADMIN — Medication 65 MILLIGRAM(S): at 06:46

## 2023-07-26 RX ADMIN — Medication 130 MILLIGRAM(S): at 23:29

## 2023-07-26 RX ADMIN — Medication 130 MILLIGRAM(S): at 02:49

## 2023-07-26 RX ADMIN — Medication 100 MILLIGRAM(S): at 11:33

## 2023-07-26 RX ADMIN — Medication 1 TABLET(S): at 11:33

## 2023-07-26 RX ADMIN — CHLORHEXIDINE GLUCONATE 1 APPLICATION(S): 213 SOLUTION TOPICAL at 11:34

## 2023-07-26 RX ADMIN — Medication 130 MILLIGRAM(S): at 15:32

## 2023-07-26 RX ADMIN — Medication 1 MILLIGRAM(S): at 11:33

## 2023-07-26 RX ADMIN — Medication 130 MILLIGRAM(S): at 00:16

## 2023-07-26 RX ADMIN — PANTOPRAZOLE SODIUM 40 MILLIGRAM(S): 20 TABLET, DELAYED RELEASE ORAL at 07:49

## 2023-07-26 RX ADMIN — Medication 65 MILLIGRAM(S): at 17:48

## 2023-07-26 NOTE — PROGRESS NOTE ADULT - ASSESSMENT
58 y.o. male with alcohol abuse with hx of DTs requiring frequent ICU admissions for phenobarb and precedex presented with abdominal pain on 7/12 with etoh level of 318. Admitted for etoh intox. Pt now with worsening DTs requiring ICU admission for further management. Pt no longer requiring intensive therapy for DT management with improvement in sx, off sedative drips and HD stable     - s/p phenobarb load for DTs. Reorder iv phenobarbitone prn for agitation and CIWA of 12.    cont with phenobarb taper of 65mg BID x 2 days,. Monitor for any oversedation or respiratory distress.   -cont thiamine, folate, Multivitamin        frequent flier remains a code grey risk   promote alcohol cessation     Full code.

## 2023-07-26 NOTE — PROGRESS NOTE ADULT - SUBJECTIVE AND OBJECTIVE BOX
CHIEF COMPLAINT: + hallucinations  + CIWA 12 today  no fever  no nausea or vomiting       PHYSICAL EXAM:    GENERAL: Moderately built, no acute distress   CHEST/LUNG:  No wheezing, no crackles   HEART: Regular rate and rhythm; No murmurs  ABDOMEN: Soft, Nontender, Nondistended; Bowel sounds present  NERVOUS SYSTEM:  Grossly non focal.  Psychiatry: alert, awake and agitated.       OBJECTIVE DATA:   Vital Signs Last 24 Hrs  T(C): 36.4 (26 Jul 2023 10:56), Max: 36.8 (25 Jul 2023 15:28)  T(F): 97.5 (26 Jul 2023 10:56), Max: 98.3 (25 Jul 2023 15:28)  HR: 92 (26 Jul 2023 10:56) (82 - 104)  BP: 113/76 (26 Jul 2023 10:56) (113/76 - 159/90)  BP(mean): 61 (25 Jul 2023 20:00) (61 - 109)  RR: 17 (26 Jul 2023 10:56) (16 - 19)  SpO2: 94% (26 Jul 2023 10:56) (94% - 100%)    Parameters below as of 26 Jul 2023 05:44  Patient On (Oxygen Delivery Method): room air               Daily     Daily   LABS:                        13.0   4.38  )-----------( 251      ( 26 Jul 2023 08:30 )             43.3             07-26    138  |  107  |  10  ----------------------------<  73  3.9   |  22  |  0.84    Ca    9.2      26 Jul 2023 06:38  Phos  2.7     07-26  Mg     2.1     07-26    TPro  8.1  /  Alb  3.5  /  TBili  0.2  /  DBili  x   /  AST  25  /  ALT  27  /  AlkPhos  56  07-25                Urinalysis Basic - ( 26 Jul 2023 06:38 )    Color: x / Appearance: x / SG: x / pH: x  Gluc: 73 mg/dL / Ketone: x  / Bili: x / Urobili: x   Blood: x / Protein: x / Nitrite: x   Leuk Esterase: x / RBC: x / WBC x   Sq Epi: x / Non Sq Epi: x / Bacteria: x    Culture - Sputum  Source: .Sputum Sputum  Gram Stain (prelim) (07-25):    Few polymorphonuclear leukocytes per low power field    Few Squamous epithelial cells per low power field    Few Gram positive cocci in pairs per oil power field    Few Gram Positive Rods per oil power field    Few Gram Negative Rods per oil power field  Preliminary Report (07-25):    Normal Respiratory Melanie present    Culture - Blood  Source: .Blood Blood  Gram Stain (07-25):    Growth in anaerobic bottle: Gram Positive Cocci in Clusters  Final Report (07-25):    Growth in anaerobic bottle: Staphylococcus epidermidis    Coagulase Negative Staphylococci isolated from a single blood culture set    may represent contamination.    Contact the Microbiology Department at [phone] if susceptibility    testing is clinically indicated.    Direct identification is available within approximately 3-5    hours either by Blood Panel Multiplexed PCR or Direct    MALDI-TOF. Details: [email or web address]  Organism: Blood Culture PCR (07-25)  Organism: Blood Culture PCR (07-25)    Sensitivities:      Method Type: PCR      -  Staphylococcus epidermidis, Methicillin resistant: Detec    Culture - Blood  Source: .Blood Blood  Preliminary Report (07-26):    No growth at 48 Hours    MEDICATIONS  (STANDING):  chlorhexidine 2% Cloths 1 Application(s) Topical daily  enoxaparin Injectable 40 milliGRAM(s) SubCutaneous every 24 hours  folic acid 1 milliGRAM(s) Oral daily  multivitamin 1 Tablet(s) Oral daily  pantoprazole    Tablet 40 milliGRAM(s) Oral before breakfast  PHENobarbital Injectable 65 milliGRAM(s) IV Push every 12 hours  thiamine 100 milliGRAM(s) Oral daily    MEDICATIONS  (PRN):  PHENobarbital Injectable 130 milliGRAM(s) IV Push every 2 hours PRN agitation/DT symptoms

## 2023-07-27 PROCEDURE — 99232 SBSQ HOSP IP/OBS MODERATE 35: CPT

## 2023-07-27 RX ORDER — HALOPERIDOL DECANOATE 100 MG/ML
3 INJECTION INTRAMUSCULAR EVERY 8 HOURS
Refills: 0 | Status: DISCONTINUED | OUTPATIENT
Start: 2023-07-27 | End: 2023-08-02

## 2023-07-27 RX ADMIN — Medication 100 MILLIGRAM(S): at 11:33

## 2023-07-27 RX ADMIN — Medication 2 MILLIGRAM(S): at 05:09

## 2023-07-27 RX ADMIN — ENOXAPARIN SODIUM 40 MILLIGRAM(S): 100 INJECTION SUBCUTANEOUS at 16:33

## 2023-07-27 RX ADMIN — Medication 130 MILLIGRAM(S): at 12:14

## 2023-07-27 RX ADMIN — Medication 1 MILLIGRAM(S): at 11:34

## 2023-07-27 RX ADMIN — Medication 2 MILLIGRAM(S): at 20:43

## 2023-07-27 RX ADMIN — Medication 2 MILLIGRAM(S): at 01:10

## 2023-07-27 RX ADMIN — CHLORHEXIDINE GLUCONATE 1 APPLICATION(S): 213 SOLUTION TOPICAL at 11:34

## 2023-07-27 RX ADMIN — Medication 1 TABLET(S): at 11:34

## 2023-07-27 NOTE — PROGRESS NOTE ADULT - SUBJECTIVE AND OBJECTIVE BOX
CHIEF COMPLAINT: + hallucinations and worsening agitation.   + CIWA 12 x 3 in last 24 hours.   no fever  no nausea or vomiting       PHYSICAL EXAM:    GENERAL: Moderately built, no acute distress   CHEST/LUNG:  No wheezing, no crackles   HEART: Regular rate and rhythm; No murmurs  ABDOMEN: Soft, Nontender, Nondistended; Bowel sounds present  NERVOUS SYSTEM:  Grossly non focal.  Psychiatry: alert, awake and agitated.       OBJECTIVE DATA:     Vital Signs Last 24 Hrs  T(C): 36.4 (27 Jul 2023 11:29), Max: 36.8 (26 Jul 2023 23:30)  T(F): 97.6 (27 Jul 2023 11:29), Max: 98.3 (27 Jul 2023 04:37)  HR: 96 (27 Jul 2023 15:20) (84 - 108)  BP: 114/85 (27 Jul 2023 15:20) (114/85 - 143/84)  BP(mean): --  RR: 18 (27 Jul 2023 15:20) (18 - 20)  SpO2: 94% (27 Jul 2023 15:20) (94% - 96%)    Parameters below as of 27 Jul 2023 15:20  Patient On (Oxygen Delivery Method): room air               Daily     Daily   LABS:                        13.0   4.38  )-----------( 251      ( 26 Jul 2023 08:30 )             43.3             07-26    138  |  107  |  10  ----------------------------<  73  3.9   |  22  |  0.84    Ca    9.2      26 Jul 2023 06:38  Phos  2.7     07-26  Mg     2.1     07-26                  Urinalysis Basic - ( 26 Jul 2023 06:38 )    Color: x / Appearance: x / SG: x / pH: x  Gluc: 73 mg/dL / Ketone: x  / Bili: x / Urobili: x   Blood: x / Protein: x / Nitrite: x   Leuk Esterase: x / RBC: x / WBC x   Sq Epi: x / Non Sq Epi: x / Bacteria: x           CAPILLARY BLOOD GLUCOSE          Culture - Sputum (collected 07-24)  Source: .Sputum Sputum  Gram Stain (07-26):    Few polymorphonuclear leukocytes per low power field    Few Squamous epithelial cells per low power field    Few Gram positive cocci in pairs per oil power field    Few Gram Positive Rods per oil power field    Few Gram Negative Rods per oil power field  Final Report (07-26):    Normal Respiratory Melanie present    Culture - Blood (collected 07-23)  Source: .Blood Blood  Gram Stain (07-25):    Growth in anaerobic bottle: Gram Positive Cocci in Clusters  Final Report (07-25):    Growth in anaerobic bottle: Staphylococcus epidermidis    Coagulase Negative Staphylococci isolated from a single blood culture set    may represent contamination.    Contact the Microbiology Department at [phone] if susceptibility    testing is clinically indicated.    Direct identification is available within approximately 3-5    hours either by Blood Panel Multiplexed PCR or Direct    MALDI-TOF. Details: [email or web address]  Organism: Blood Culture PCR (07-25)  Organism: Blood Culture PCR (07-25)    Sensitivities:      -  Staphylococcus epidermidis, Methicillin resistant: Detec      Method Type: PCR    Culture - Blood (collected 07-23)  Source: .Blood Blood  Preliminary Report (07-27):    No growth at 72 Hours      MEDICATIONS  (STANDING):  chlorhexidine 2% Cloths 1 Application(s) Topical daily  enoxaparin Injectable 40 milliGRAM(s) SubCutaneous every 24 hours  folic acid 1 milliGRAM(s) Oral daily  multivitamin 1 Tablet(s) Oral daily  pantoprazole    Tablet 40 milliGRAM(s) Oral before breakfast  PHENobarbital Injectable 65 milliGRAM(s) IV Push every 12 hours  thiamine 100 milliGRAM(s) Oral daily    MEDICATIONS  (PRN):  haloperidol    Injectable 3 milliGRAM(s) IV Push every 8 hours PRN agitation  LORazepam   Injectable 2 milliGRAM(s) IV Push every 8 hours PRN Agitation  PHENobarbital Injectable 130 milliGRAM(s) IV Push every 2 hours PRN agitation/DT symptoms

## 2023-07-27 NOTE — PROGRESS NOTE ADULT - ASSESSMENT
58 y.o. male with alcohol abuse with hx of DTs requiring frequent ICU admissions for phenobarb and precedex presented with abdominal pain on 7/12 with etoh level of 318. Admitted for etoh intox. Pt now with worsening DTs requiring ICU admission for further management. Pt no longer requiring intensive therapy for DT management with improvement in sx, off sedative drips and HD stable     - s/p phenobarb load for DTs. still agitated and CIWA 12. Phenobarbitone iv prn and oral not very effective. added iv ativan and haldol. monitor for oversedation. close observation.   -cont thiamine, folate, Multivitamin        frequent flier remains a code grey risk   promote alcohol cessation     Full code.

## 2023-07-28 PROCEDURE — 99232 SBSQ HOSP IP/OBS MODERATE 35: CPT

## 2023-07-28 PROCEDURE — 93010 ELECTROCARDIOGRAM REPORT: CPT

## 2023-07-28 RX ADMIN — Medication 2 MILLIGRAM(S): at 08:16

## 2023-07-28 RX ADMIN — CHLORHEXIDINE GLUCONATE 1 APPLICATION(S): 213 SOLUTION TOPICAL at 11:20

## 2023-07-28 RX ADMIN — Medication 65 MILLIGRAM(S): at 06:49

## 2023-07-28 RX ADMIN — PANTOPRAZOLE SODIUM 40 MILLIGRAM(S): 20 TABLET, DELAYED RELEASE ORAL at 07:47

## 2023-07-28 RX ADMIN — ENOXAPARIN SODIUM 40 MILLIGRAM(S): 100 INJECTION SUBCUTANEOUS at 17:01

## 2023-07-28 RX ADMIN — Medication 2 MILLIGRAM(S): at 20:12

## 2023-07-28 RX ADMIN — HALOPERIDOL DECANOATE 3 MILLIGRAM(S): 100 INJECTION INTRAMUSCULAR at 14:40

## 2023-07-28 NOTE — PROGRESS NOTE ADULT - ASSESSMENT
58 y.o. male with alcohol abuse with hx of DTs requiring frequent ICU admissions for phenobarb and precedex presented with abdominal pain on 7/12 with etoh level of 318. Admitted for etoh intox. Pt now with worsening DTs requiring ICU admission for further management. Pt no longer requiring intensive therapy for DT management with improvement in sx, off sedative drips and HD stable     - s/p phenobarb load for DTs. persistent agitation and eleveated CIWA score of 12. cont iv ativan prn for agitation. prn haldol can be used if and when ativan ineffective. Will need to follow EKG for QTc monitoring while on haldol.  monitor for oversedation. close observation.   -cont thiamine, folate, Multivitamin    I discussed about him with his wife and father.     frequent flier remains a code grey risk   promote alcohol cessation     Full code.

## 2023-07-28 NOTE — PROGRESS NOTE ADULT - SUBJECTIVE AND OBJECTIVE BOX
CHIEF COMPLAINT: + hallucinations and persistent agitation.   + CIWA 12 x 3 in last 24 hours. received haldol and ativan.   no fever  no nausea or vomiting       PHYSICAL EXAM:    GENERAL: Moderately built, no acute distress   CHEST/LUNG:  No wheezing, no crackles   HEART: Regular rate and rhythm; No murmurs  ABDOMEN: Soft, Nontender, Nondistended; Bowel sounds present  Psychiatry: somnolent at this time from meds.       OBJECTIVE DATA:     Vital Signs Last 24 Hrs  T(C): 36.5 (28 Jul 2023 10:30), Max: 36.8 (27 Jul 2023 16:46)  T(F): 97.7 (28 Jul 2023 10:30), Max: 98.3 (27 Jul 2023 16:46)  HR: 87 (28 Jul 2023 10:30) (77 - 108)  BP: 107/74 (28 Jul 2023 10:30) (107/74 - 140/73)  BP(mean): --  RR: 17 (28 Jul 2023 10:30) (17 - 19)  SpO2: 95% (28 Jul 2023 10:30) (94% - 95%)    Parameters below as of 28 Jul 2023 10:30  Patient On (Oxygen Delivery Method): room air      Culture - Sputum (collected 07-24)  Source: .Sputum Sputum  Gram Stain (07-26):    Few polymorphonuclear leukocytes per low power field    Few Squamous epithelial cells per low power field    Few Gram positive cocci in pairs per oil power field    Few Gram Positive Rods per oil power field    Few Gram Negative Rods per oil power field  Final Report (07-26):    Normal Respiratory Melanie present    Culture - Blood (collected 07-23)  Source: .Blood Blood  Gram Stain (07-25):    Growth in anaerobic bottle: Gram Positive Cocci in Clusters  Final Report (07-25):    Growth in anaerobic bottle: Staphylococcus epidermidis    Coagulase Negative Staphylococci isolated from a single blood culture set    may represent contamination.    Contact the Microbiology Department at [phone] if susceptibility    testing is clinically indicated.    Direct identification is available within approximately 3-5    hours either by Blood Panel Multiplexed PCR or Direct    MALDI-TOF. Details: [email or web address]  Organism: Blood Culture PCR (07-25)  Organism: Blood Culture PCR (07-25)    Sensitivities:      Method Type: PCR      -  Staphylococcus epidermidis, Methicillin resistant: Detec    Culture - Blood (collected 07-23)  Source: .Blood Blood  Preliminary Report (07-28):    No growth at 4 days      EKG reviewed by me showed no QTc prolongation.     MEDICATIONS  (STANDING):  chlorhexidine 2% Cloths 1 Application(s) Topical daily  enoxaparin Injectable 40 milliGRAM(s) SubCutaneous every 24 hours  folic acid 1 milliGRAM(s) Oral daily  multivitamin 1 Tablet(s) Oral daily  pantoprazole    Tablet 40 milliGRAM(s) Oral before breakfast  thiamine 100 milliGRAM(s) Oral daily    MEDICATIONS  (PRN):  haloperidol    Injectable 3 milliGRAM(s) IV Push every 8 hours PRN agitation  LORazepam   Injectable 2 milliGRAM(s) IV Push every 8 hours PRN Agitation  PHENobarbital Injectable 130 milliGRAM(s) IV Push every 2 hours PRN agitation/DT symptoms

## 2023-07-29 LAB
CULTURE RESULTS: SIGNIFICANT CHANGE UP
SPECIMEN SOURCE: SIGNIFICANT CHANGE UP

## 2023-07-29 PROCEDURE — 99232 SBSQ HOSP IP/OBS MODERATE 35: CPT

## 2023-07-29 RX ORDER — QUETIAPINE FUMARATE 200 MG/1
25 TABLET, FILM COATED ORAL AT BEDTIME
Refills: 0 | Status: DISCONTINUED | OUTPATIENT
Start: 2023-07-29 | End: 2023-07-30

## 2023-07-29 RX ORDER — QUETIAPINE FUMARATE 200 MG/1
25 TABLET, FILM COATED ORAL ONCE
Refills: 0 | Status: COMPLETED | OUTPATIENT
Start: 2023-07-29 | End: 2023-07-29

## 2023-07-29 RX ADMIN — ENOXAPARIN SODIUM 40 MILLIGRAM(S): 100 INJECTION SUBCUTANEOUS at 17:46

## 2023-07-29 RX ADMIN — HALOPERIDOL DECANOATE 3 MILLIGRAM(S): 100 INJECTION INTRAMUSCULAR at 20:17

## 2023-07-29 RX ADMIN — Medication 1 TABLET(S): at 12:08

## 2023-07-29 RX ADMIN — PANTOPRAZOLE SODIUM 40 MILLIGRAM(S): 20 TABLET, DELAYED RELEASE ORAL at 10:12

## 2023-07-29 RX ADMIN — Medication 100 MILLIGRAM(S): at 12:09

## 2023-07-29 RX ADMIN — CHLORHEXIDINE GLUCONATE 1 APPLICATION(S): 213 SOLUTION TOPICAL at 13:58

## 2023-07-29 RX ADMIN — Medication 2 MILLIGRAM(S): at 10:22

## 2023-07-29 RX ADMIN — HALOPERIDOL DECANOATE 3 MILLIGRAM(S): 100 INJECTION INTRAMUSCULAR at 03:18

## 2023-07-29 RX ADMIN — QUETIAPINE FUMARATE 25 MILLIGRAM(S): 200 TABLET, FILM COATED ORAL at 12:09

## 2023-07-29 RX ADMIN — Medication 1 MILLIGRAM(S): at 12:08

## 2023-07-29 NOTE — CHART NOTE - NSCHARTNOTESELECT_GEN_ALL_CORE
ICU downgrade note/Transfer Note
Event Note
Event Note
Nutrition Services
Nutrition Services
midline attempt/Event Note

## 2023-07-29 NOTE — CHART NOTE - NSCHARTNOTEFT_GEN_A_CORE
Pt alert and confused, with intermittent agitation.  Pt with PMHx of ETOH abuse, gastritis, PUD, hx of DTs requiring frequent ICU admissions for phenobarb and precedex. Pt presented with abdominal pain and elevated ETOH levels; admitted for ETOH intoxication & SI. Course with worsening DTs requiring ICU admission for further management, with improvement in sx and now on medical floor.    Factors impacting intake: [ ] none [ ] nausea  [ ] vomiting [ ] diarrhea [ ] constipation  [ ]chewing problems [ ] swallowing issues  [x] other: ETOH withdrawal    Diet Prescription: Soft and Bite Sized (active since 07-20)    Intake: mostly <75% of meals >1 week; requires total feeding assistance    Current Weight: 81.1 kg (07/25), 84.1 kg (07/12)  % Weight Change: 3.5% decrease x 13 days    1+ generalized edema, R hand appears very edematous    Physical appearance: Unable to conduct nutrition focused physical exam at this time, however pt with no visible signs of malnutrition except for mild orbital depletion noted    Pertinent Medications: MEDICATIONS  (STANDING):  chlorhexidine 2% Cloths 1 Application(s) Topical daily  enoxaparin Injectable 40 milliGRAM(s) SubCutaneous every 24 hours  folic acid 1 milliGRAM(s) Oral daily  multivitamin 1 Tablet(s) Oral daily  pantoprazole    Tablet 40 milliGRAM(s) Oral before breakfast  thiamine 100 milliGRAM(s) Oral daily    MEDICATIONS  (PRN):  haloperidol    Injectable 3 milliGRAM(s) IV Push every 8 hours PRN agitation  LORazepam   Injectable 2 milliGRAM(s) IV Push every 8 hours PRN Agitation    Pertinent Labs:  07-26 Phos 2.7 mg/dL 07-25 Alb 3.5 g/dL  07-18 HgbA1c 5.3%    Skin: WDL    Estimated Needs:   [x] no change since previous assessment on 07/14  [ ] recalculated:     Previous Nutrition Diagnosis:   [x] Inadequate Energy Intake  Etiology: Inability to consume sufficient energy related to ETOH withdrawal, SI  Signs/Symptoms: PO intake mostly 26-50% x 2 days during admission    Goal/Expected Outcome: PO intake >75% for meals/supplement    Nutrition Diagnosis is [ ] ongoing  [ ] resolved [x] not applicable (see below for new nutrition diagnosis)    New Nutrition Diagnosis: [x] Moderate malnutrition in context of acute illness    Related to: Inadequate protein-energy intake related to ETOH withdrawal    As evidenced by: <75% of nutrition needs >1 week; 3.5% wt loss x 13 days    Goal: Pt to meet >75% of protein-energy needs via meals/supplement      Interventions:   Recommend  [ ] Change Diet To:  [x] Nutrition Supplement: add Ensure Plus High Protein x 2/day (700 kcal & 40 g protein) to current diet rx  [ ] Nutrition Support  [x] Other: Continue to provide encouragement and total assistance with PO intake; Continue with MVI, thiamine, and folic acid supplements    Monitoring and Evaluation:   [ x ] PO intake [ x ] Tolerance to diet prescription [ x ] weights [ x ] labs[ x ] follow up per protocol  [ ] other:

## 2023-07-29 NOTE — PROGRESS NOTE ADULT - ASSESSMENT
58 y.o. male with alcohol abuse with hx of DTs requiring frequent ICU admissions for phenobarb and precedex presented with abdominal pain on 7/12 with etoh level of 318. Admitted for etoh intox. Pt now with worsening DTs requiring ICU admission for further management. Pt no longer requiring intensive therapy for DT management with improvement in sx, off sedative drips and HD stable     - s/p phenobarb load for DTs. persistent agitation and eleveated CIWA score of 12. cont iv ativan prn for agitation. prn haldol can be used if and when ativan ineffective. Will need to follow EKG for QTc monitoring while on haldol.  monitor for oversedation. close observation.   -cont thiamine, folate, Multivitamin    I discussed about him with his wife and father.     frequent flier remains a code grey risk   promote alcohol cessation     Full code.      59 yo man w/ a history of HLD, alcohol abuse with hx of PUD/ gastritis, DTs requiring frequent ICU admissions for phenobarb and precedex presented with abdominal pain on 7/12 with Etoh level of 318 & was admitted for Etoh intox. Hospital stay was complicated by worsening DTs requiring ICU admission on 6/17 w/ Precedex drip & phenobarb. Pt's withdrawal improved and he was transferred to the medical floors on 6/24. Pt has been persistently delirious.      Delirium   - CT head on admission showed no acute intracranial hemorrhage, territorial infarct, mass effect or calvarial fracture  - s/p phenobarb for DTs  - stop ativan as it may worsen delirium  - c/w Seroquel and PRN haldol & monitor QTc   - consult psych   - c/w thiamine, folate, Multivitamin    Thrombocytosis  - likely reactive  - will watch     Leukopenia POA  - resolved  - likely due to ETOH use    Prophylaxis:  DVT: Lovenox  GI: Protonix    frequent flier remains a code grey risk     Full code.

## 2023-07-29 NOTE — DIETITIAN NUTRITION RISK NOTIFICATION - TREATMENT: THE FOLLOWING DIET HAS BEEN RECOMMENDED
Diet, Soft and Bite Sized:   Supplement Feeding Modality:  Oral  Ensure Plus High Protein Cans or Servings Per Day:  1       Frequency:  Two Times a day (07-29-23 @ 10:57) [Pending Verification By Attending]  Diet, Soft and Bite Sized (07-20-23 @ 15:47) [Active]

## 2023-07-29 NOTE — CHART NOTE - NSCHARTNOTEFT_GEN_A_CORE
Hospitalist Medicine NP     Called by RN to evaluate patient for increased restlessness and agitation overnight and this morning. Patient is now s/p phenobarbital load and Precedex gtt for alcohol withdrawal in ICU with downgrade to medicine on 7/24. Patient is currently on ativan and haldol prn for agitation.   Patient is no longer withdrawing from alcohol, behavior is likely delirium secondary to prolonged hospital stay.     Will add Seroquel 25 mg QHS, with a stat one time dose now  D/C ativan as it can worsen delirium  Continue prn haldol   monitor Qtc closely (442 currently)   fall precautions, increased safety checks  d/w Dr. Quijano, aware and in agreement with current plan of care.     Asia NP-BC  Medicine [phone]

## 2023-07-29 NOTE — PROGRESS NOTE ADULT - SUBJECTIVE AND OBJECTIVE BOX
Patient is a 58y old  Male who presents with a chief complaint of ETOH withdrawal. (28 Jul 2023 11:20)      INTERVAL HPI/OVERNIGHT EVENTS:    MEDICATIONS  (STANDING):  chlorhexidine 2% Cloths 1 Application(s) Topical daily  enoxaparin Injectable 40 milliGRAM(s) SubCutaneous every 24 hours  folic acid 1 milliGRAM(s) Oral daily  multivitamin 1 Tablet(s) Oral daily  pantoprazole    Tablet 40 milliGRAM(s) Oral before breakfast  QUEtiapine 25 milliGRAM(s) Oral at bedtime  thiamine 100 milliGRAM(s) Oral daily    MEDICATIONS  (PRN):  haloperidol    Injectable 3 milliGRAM(s) IV Push every 8 hours PRN agitation      Allergies    No Known Allergies    Intolerances        Vital Signs Last 24 Hrs  T(C): 36.8 (29 Jul 2023 16:28), Max: 36.8 (29 Jul 2023 16:28)  T(F): 98.2 (29 Jul 2023 16:28), Max: 98.2 (29 Jul 2023 16:28)  HR: 89 (29 Jul 2023 16:28) (89 - 105)  BP: 119/79 (29 Jul 2023 16:28) (110/74 - 158/98)  BP(mean): --  RR: 18 (29 Jul 2023 16:28) (16 - 18)  SpO2: 96% (29 Jul 2023 16:28) (92% - 96%)    Parameters below as of 29 Jul 2023 06:09  Patient On (Oxygen Delivery Method): room air        PHYSICAL EXAM:  GENERAL: NAD   HEAD:  Atraumatic, Normocephalic  EYES: EOMI, PERRLA  NECK: Supple   NERVOUS SYSTEM:  Alert & confused  CHEST/LUNG: Clear to auscultation bilaterally; No rales, rhonchi, wheezing, or rubs  HEART: Regular rate and rhythm; No murmurs, rubs, or gallops  ABDOMEN: Soft, Nontender, Nondistended; Bowel sounds present  EXTREMITIES:   No clubbing, cyanosis, or edema       57 yo man w/ a history of HLD, alcohol abuse with hx of PUD/ gastritis, DTs requiring frequent ICU admissions for phenobarb and precedex presented with abdominal pain on 7/12 with Etoh level of 318 & was admitted for Etoh intox. Hospital stay was complicated by worsening DTs requiring ICU admission on 6/17 w/ Precedex drip & phenobarb. Pt's withdrawal improved and he was transferred to the medical floors on 6/24. Pt has been persistently delirious. He is lying in bed in NAD.        MEDICATIONS  (STANDING):  chlorhexidine 2% Cloths 1 Application(s) Topical daily  enoxaparin Injectable 40 milliGRAM(s) SubCutaneous every 24 hours  folic acid 1 milliGRAM(s) Oral daily  multivitamin 1 Tablet(s) Oral daily  pantoprazole    Tablet 40 milliGRAM(s) Oral before breakfast  QUEtiapine 25 milliGRAM(s) Oral at bedtime  thiamine 100 milliGRAM(s) Oral daily    MEDICATIONS  (PRN):  haloperidol    Injectable 3 milliGRAM(s) IV Push every 8 hours PRN agitation      Allergies    No Known Allergies    Intolerances        Vital Signs Last 24 Hrs  T(C): 36.8 (29 Jul 2023 16:28), Max: 36.8 (29 Jul 2023 16:28)  T(F): 98.2 (29 Jul 2023 16:28), Max: 98.2 (29 Jul 2023 16:28)  HR: 89 (29 Jul 2023 16:28) (89 - 105)  BP: 119/79 (29 Jul 2023 16:28) (110/74 - 158/98)   RR: 18 (29 Jul 2023 16:28) (16 - 18)  SpO2: 96% (29 Jul 2023 16:28) (92% - 96%)    Parameters below as of 29 Jul 2023 06:09  Patient On (Oxygen Delivery Method): room air        PHYSICAL EXAM:  GENERAL: NAD   HEAD:  Atraumatic, Normocephalic  EYES: EOMI, PERRLA  NECK: Supple   NERVOUS SYSTEM:  Alert & confused  CHEST/LUNG: Clear to auscultation bilaterally; No rales, rhonchi, wheezing, or rubs  HEART: Regular rate and rhythm; No murmurs, rubs, or gallops  ABDOMEN: Soft, Nontender, Nondistended; Bowel sounds present  EXTREMITIES:   No clubbing, cyanosis, or edema

## 2023-07-30 LAB
ALBUMIN SERPL ELPH-MCNC: 3.4 G/DL — SIGNIFICANT CHANGE UP (ref 3.3–5)
ALP SERPL-CCNC: 62 U/L — SIGNIFICANT CHANGE UP (ref 40–120)
ALT FLD-CCNC: 39 U/L — SIGNIFICANT CHANGE UP (ref 12–78)
ANION GAP SERPL CALC-SCNC: 11 MMOL/L — SIGNIFICANT CHANGE UP (ref 5–17)
AST SERPL-CCNC: 41 U/L — HIGH (ref 15–37)
BILIRUB SERPL-MCNC: 0.3 MG/DL — SIGNIFICANT CHANGE UP (ref 0.2–1.2)
BUN SERPL-MCNC: 15 MG/DL — SIGNIFICANT CHANGE UP (ref 7–23)
CALCIUM SERPL-MCNC: 9.3 MG/DL — SIGNIFICANT CHANGE UP (ref 8.5–10.1)
CHLORIDE SERPL-SCNC: 107 MMOL/L — SIGNIFICANT CHANGE UP (ref 96–108)
CO2 SERPL-SCNC: 21 MMOL/L — LOW (ref 22–31)
CREAT SERPL-MCNC: 0.87 MG/DL — SIGNIFICANT CHANGE UP (ref 0.5–1.3)
EGFR: 101 ML/MIN/1.73M2 — SIGNIFICANT CHANGE UP
GLUCOSE SERPL-MCNC: 75 MG/DL — SIGNIFICANT CHANGE UP (ref 70–99)
HCT VFR BLD CALC: 38.3 % — LOW (ref 39–50)
HGB BLD-MCNC: 12.3 G/DL — LOW (ref 13–17)
MAGNESIUM SERPL-MCNC: 2 MG/DL — SIGNIFICANT CHANGE UP (ref 1.6–2.6)
MCHC RBC-ENTMCNC: 24.1 PG — LOW (ref 27–34)
MCHC RBC-ENTMCNC: 32.1 G/DL — SIGNIFICANT CHANGE UP (ref 32–36)
MCV RBC AUTO: 75 FL — LOW (ref 80–100)
NRBC # BLD: 0 /100 WBCS — SIGNIFICANT CHANGE UP (ref 0–0)
PHOSPHATE SERPL-MCNC: 2.7 MG/DL — SIGNIFICANT CHANGE UP (ref 2.5–4.5)
PLATELET # BLD AUTO: 448 K/UL — HIGH (ref 150–400)
POTASSIUM SERPL-MCNC: 4.1 MMOL/L — SIGNIFICANT CHANGE UP (ref 3.5–5.3)
POTASSIUM SERPL-SCNC: 4.1 MMOL/L — SIGNIFICANT CHANGE UP (ref 3.5–5.3)
PROT SERPL-MCNC: 8.1 GM/DL — SIGNIFICANT CHANGE UP (ref 6–8.3)
RBC # BLD: 5.11 M/UL — SIGNIFICANT CHANGE UP (ref 4.2–5.8)
RBC # FLD: 22 % — HIGH (ref 10.3–14.5)
SODIUM SERPL-SCNC: 139 MMOL/L — SIGNIFICANT CHANGE UP (ref 135–145)
TSH SERPL-MCNC: 0.95 UIU/ML — SIGNIFICANT CHANGE UP (ref 0.36–3.74)
WBC # BLD: 7.82 K/UL — SIGNIFICANT CHANGE UP (ref 3.8–10.5)
WBC # FLD AUTO: 7.82 K/UL — SIGNIFICANT CHANGE UP (ref 3.8–10.5)

## 2023-07-30 PROCEDURE — 99232 SBSQ HOSP IP/OBS MODERATE 35: CPT

## 2023-07-30 PROCEDURE — 93010 ELECTROCARDIOGRAM REPORT: CPT

## 2023-07-30 RX ADMIN — PANTOPRAZOLE SODIUM 40 MILLIGRAM(S): 20 TABLET, DELAYED RELEASE ORAL at 07:52

## 2023-07-30 RX ADMIN — Medication 100 MILLIGRAM(S): at 12:36

## 2023-07-30 RX ADMIN — Medication 1 TABLET(S): at 12:36

## 2023-07-30 RX ADMIN — Medication 1 MILLIGRAM(S): at 12:36

## 2023-07-30 RX ADMIN — QUETIAPINE FUMARATE 25 MILLIGRAM(S): 200 TABLET, FILM COATED ORAL at 00:09

## 2023-07-30 RX ADMIN — ENOXAPARIN SODIUM 40 MILLIGRAM(S): 100 INJECTION SUBCUTANEOUS at 16:53

## 2023-07-30 RX ADMIN — CHLORHEXIDINE GLUCONATE 1 APPLICATION(S): 213 SOLUTION TOPICAL at 12:34

## 2023-07-30 NOTE — PROGRESS NOTE ADULT - SUBJECTIVE AND OBJECTIVE BOX
59 yo man w/ a history of HLD, alcohol abuse with hx of PUD/ gastritis, DTs requiring frequent ICU admissions for phenobarb and precedex presented with abdominal pain on 7/12 with Etoh level of 318 & was admitted for Etoh intox. Hospital stay was complicated by worsening DTs requiring ICU admission on 6/17 w/ Precedex drip & phenobarb. Pt's withdrawal improved and he was transferred to the medical floors on 6/24. Pt has been persistently delirious. He is lying in bed in NAD.       MEDICATIONS  (STANDING):  chlorhexidine 2% Cloths 1 Application(s) Topical daily  enoxaparin Injectable 40 milliGRAM(s) SubCutaneous every 24 hours  folic acid 1 milliGRAM(s) Oral daily  multivitamin 1 Tablet(s) Oral daily  pantoprazole    Tablet 40 milliGRAM(s) Oral before breakfast  thiamine 100 milliGRAM(s) Oral daily    MEDICATIONS  (PRN):  haloperidol    Injectable 3 milliGRAM(s) IV Push every 8 hours PRN agitation      Allergies    No Known Allergies    Intolerances        Vital Signs Last 24 Hrs  T(C): 37.1 (30 Jul 2023 11:09), Max: 37.8 (30 Jul 2023 05:21)  T(F): 98.8 (30 Jul 2023 11:09), Max: 100 (30 Jul 2023 05:21)  HR: 108 (30 Jul 2023 11:09) (96 - 108)  BP: 127/88 (30 Jul 2023 11:09) (126/83 - 151/89)   RR: 17 (30 Jul 2023 11:09) (17 - 18)  SpO2: 95% (30 Jul 2023 11:09) (94% - 96%)    Parameters below as of 30 Jul 2023 09:20  Patient On (Oxygen Delivery Method): room air        PHYSICAL EXAM:  GENERAL: NAD   HEAD:  Atraumatic, Normocephalic  EYES: EOMI, PERRLA  NECK: Supple   NERVOUS SYSTEM:  Alert & confused  CHEST/LUNG: Clear to auscultation bilaterally; No rales, rhonchi, wheezing, or rubs  HEART: Regular rate and rhythm; No murmurs, rubs, or gallops  ABDOMEN: Soft, Nontender, Nondistended; Bowel sounds present  EXTREMITIES:   No clubbing, cyanosis, or edema    LABS:                        12.3   7.82  )-----------( 448      ( 30 Jul 2023 10:15 )             38.3     07-30    139  |  107  |  15  ----------------------------<  75  4.1   |  21<L>  |  0.87    Ca    9.3      30 Jul 2023 06:30  Phos  2.7     07-30  Mg     2.0     07-30    TPro  8.1  /  Alb  3.4  /  TBili  0.3  /  DBili  x   /  AST  41<H>  /  ALT  39  /  AlkPhos  62  07-30      Urinalysis Basic - ( 30 Jul 2023 06:30 )    Color: x / Appearance: x / SG: x / pH: x  Gluc: 75 mg/dL / Ketone: x  / Bili: x / Urobili: x   Blood: x / Protein: x / Nitrite: x   Leuk Esterase: x / RBC: x / WBC x   Sq Epi: x / Non Sq Epi: x / Bacteria: x         RADIOLOGY & ADDITIONAL TESTS:    07-29-23 @ 07:01  -  07-30-23 @ 07:00  --------------------------------------------------------  IN:    Oral Fluid: 100 mL  Total IN: 100 mL    OUT:  Total OUT: 0 mL    Total NET: 100 mL       Autism

## 2023-07-30 NOTE — PROGRESS NOTE ADULT - ASSESSMENT
59 yo man w/ a history of HLD, alcohol abuse with hx of PUD/ gastritis, DTs requiring frequent ICU admissions for phenobarb and precedex presented with abdominal pain on 7/12 with Etoh level of 318 & was admitted for Etoh intox. Hospital stay was complicated by worsening DTs requiring ICU admission on 6/17 w/ Precedex drip & phenobarb. Pt's withdrawal improved and he was transferred to the medical floors on 6/24. Pt has been persistently delirious.      Delirium   - CT head on admission showed no acute intracranial hemorrhage, territorial infarct, mass effect or calvarial fracture  - s/p phenobarb for DTs  - stop ativan as it may worsen delirium  - stop Seroquel  - c/w PRN haldol & monitor QTc   - consult psych   - c/w thiamine, folate, Multivitamin    Thrombocytosis  - likely reactive  - will watch     Leukopenia POA  - resolved  - likely due to ETOH use    Prophylaxis:  DVT: Lovenox  GI: Protonix    frequent flier remains a code grey risk     Full code.

## 2023-07-31 DIAGNOSIS — F05 DELIRIUM DUE TO KNOWN PHYSIOLOGICAL CONDITION: ICD-10-CM

## 2023-07-31 LAB
ALBUMIN SERPL ELPH-MCNC: 3.3 G/DL — SIGNIFICANT CHANGE UP (ref 3.3–5)
ALP SERPL-CCNC: 59 U/L — SIGNIFICANT CHANGE UP (ref 40–120)
ALT FLD-CCNC: 41 U/L — SIGNIFICANT CHANGE UP (ref 12–78)
AMMONIA BLD-MCNC: 32 UMOL/L — SIGNIFICANT CHANGE UP (ref 11–32)
AST SERPL-CCNC: 37 U/L — SIGNIFICANT CHANGE UP (ref 15–37)
BILIRUB DIRECT SERPL-MCNC: 0.1 MG/DL — SIGNIFICANT CHANGE UP (ref 0–0.3)
BILIRUB INDIRECT FLD-MCNC: 0.1 MG/DL — LOW (ref 0.2–1)
BILIRUB SERPL-MCNC: 0.2 MG/DL — SIGNIFICANT CHANGE UP (ref 0.2–1.2)
LACTATE SERPL-SCNC: 1.2 MMOL/L — SIGNIFICANT CHANGE UP (ref 0.7–2)
PROT SERPL-MCNC: 8 GM/DL — SIGNIFICANT CHANGE UP (ref 6–8.3)

## 2023-07-31 PROCEDURE — 99232 SBSQ HOSP IP/OBS MODERATE 35: CPT

## 2023-07-31 PROCEDURE — 99222 1ST HOSP IP/OBS MODERATE 55: CPT

## 2023-07-31 RX ADMIN — Medication 1 TABLET(S): at 13:46

## 2023-07-31 RX ADMIN — Medication 100 MILLIGRAM(S): at 13:46

## 2023-07-31 RX ADMIN — PANTOPRAZOLE SODIUM 40 MILLIGRAM(S): 20 TABLET, DELAYED RELEASE ORAL at 06:02

## 2023-07-31 RX ADMIN — ENOXAPARIN SODIUM 40 MILLIGRAM(S): 100 INJECTION SUBCUTANEOUS at 18:55

## 2023-07-31 RX ADMIN — HALOPERIDOL DECANOATE 3 MILLIGRAM(S): 100 INJECTION INTRAMUSCULAR at 10:43

## 2023-07-31 RX ADMIN — Medication 1 MILLIGRAM(S): at 13:45

## 2023-07-31 NOTE — BH CONSULTATION LIAISON ASSESSMENT NOTE - SUMMARY
On day of 20 of admission - no clinical indication for continued phenobarbital use; needs a complete 'wash out"

## 2023-07-31 NOTE — PROGRESS NOTE ADULT - ASSESSMENT
57 yo man w/ a history of HLD, alcohol abuse with hx of PUD/ gastritis, DTs requiring frequent ICU admissions for phenobarb and precedex presented with abdominal pain on 7/12 with Etoh level of 318 & was admitted for Etoh intox. Hospital stay was complicated by worsening DTs requiring ICU admission on 6/17 w/ Precedex drip & phenobarb. Pt's withdrawal improved and he was transferred to the medical floors on 6/24. Pt has been persistently delirious.      Delirium   - CT head on admission showed no acute intracranial hemorrhage, territorial infarct, mass effect or calvarial fracture  - s/p phenobarb for DTs  - stop ativan as it may worsen delirium  - stop Seroquel  - c/w PRN haldol & monitor QTc   - psych note read and appreciated   - c/w thiamine, folate, Multivitamin    Thrombocytosis  - likely reactive  - will watch     Leukopenia POA  - resolved  - likely due to ETOH use    Prophylaxis:  DVT: Lovenox  GI: Protonix    frequent flier remains a code grey risk     Full code.

## 2023-07-31 NOTE — BH CONSULTATION LIAISON ASSESSMENT NOTE - NSBHCHARTREVIEWLAB_PSY_A_CORE FT
98 07-30    139  |  107  |  15  ----------------------------<  75  4.1   |  21<L>  |  0.87    Ca    9.3      30 Jul 2023 06:30  Phos  2.7     07-30  Mg     2.0     07-30    TPro  8.1  /  Alb  3.4  /  TBili  0.3  /  DBili  x   /  AST  41<H>  /  ALT  39  /  AlkPhos  62  07-30

## 2023-07-31 NOTE — BH CONSULTATION LIAISON ASSESSMENT NOTE - HPI (INCLUDE ILLNESS QUALITY, SEVERITY, DURATION, TIMING, CONTEXT, MODIFYING FACTORS, ASSOCIATED SIGNS AND SYMPTOMS)
PATIENT KNOWN TO WRITER FROM PRIOR ADMISSIONS; COMPLEX CARE CASE: 59 yo [place]  [place] male, lives with his son in a private home, works as a , with long history of continuous Alcohol Abuse spanning decades (at most reports 2-3 bottles of vodka 1-2/day), with associated numerous ED visits and hospital admissions (ie. epigastric pain, vomiting, nausea, hematemesis, aspiration pneumonia, esophageal ulcer, UGI bleed, MSSA aspiration PNA, ICU admissions), has had BALs > 400 before, admitted again to [facility] on 7/12/23 for abdominal pain. Pt came to the ER with similar complaints the week prior with CT abd neg and CTH neg on 7/5. Pt was admitted to the floor on 7/12 with etoh level of 318 for etoh intox. Pt drinks ~1L of vodka/day with last drink on 7/12. Pt was acting his normal self on the floor. Pt was started on standing benzos for prevention of etoh withdrawal. Overnight, pt developed worsening DTs, requiring 2mg ativan q1hrs this AM. Pt received ~18mg ativan in last 24hrs. RRT called for CIWA>20 again despite ativan therapy. PT initiated on phenobarb therapy 130mg q1hr prn. Pt received 390mg phenobarb and RRT called again for pt trying to get out of bed and being agitated. Pt accepted to ICU for further management of DTs. Completed phenobarb taper/CIWA protocol and downgraded on 7/24/23. Last benzo on 7/29/23 (Ativan 2mg IVP x 1 dose at 10am).        PATIENT KNOWN TO WRITER FROM PRIOR ADMISSIONS; COMPLEX CARE CASE: 57 yo [place]  [place] male, lives with his son in a private home, works as a , with long history of continuous Alcohol Abuse spanning decades (at most reports 2-3 bottles of vodka 1-2/day), with associated numerous ED visits and hospital admissions (ie. epigastric pain, vomiting, nausea, hematemesis, aspiration pneumonia, esophageal ulcer, UGI bleed, MSSA aspiration PNA, ICU admissions), has had BALs > 400 before, admitted again to VS on 7/12/23 for abdominal pain. Pt came to the ER with similar complaints the week prior with CT abd neg and CTH neg on 7/5. Pt was admitted to the floor on 7/12 with etoh level of 318 for etoh intox. Pt drinks ~1L of vodka/day with last drink on 7/12. Pt was acting his normal self on the floor. Pt was started on standing benzos for prevention of etoh withdrawal. Overnight, pt developed worsening DTs, requiring 2mg ativan q1hrs this AM. Pt received ~18mg ativan in last 24hrs. RRT called for CIWA>20 again despite ativan therapy. PT initiated on phenobarb therapy 130mg q1hr prn. Pt received 390mg phenobarb and RRT called again for pt trying to get out of bed and being agitated. Pt accepted to ICU for further management of DTs. Completed phenobarb taper/CIWA protocol and downgraded on 7/24/23. Last benzo on 7/29/23 (Ativan 2mg IVP x 1 dose at 10am).     EXAM: lying in bed in deep sleep, awakens only when shoulder gently shaken and name loudly called. opens eyes, says hello and "ok" when asked how he is doing then falls back asleep. As per nursing, residual 275cc

## 2023-07-31 NOTE — PROGRESS NOTE ADULT - SUBJECTIVE AND OBJECTIVE BOX
57 yo man w/ a history of HLD, alcohol abuse with hx of PUD/ gastritis, DTs requiring frequent ICU admissions for phenobarb and precedex presented with abdominal pain on 7/12 with Etoh level of 318 & was admitted for Etoh intox. Hospital stay was complicated by worsening DTs requiring ICU admission on 6/17 w/ Precedex drip & phenobarb. Pt's withdrawal improved and he was transferred to the medical floors on 6/24. Pt has been persistently delirious. He is lying in bed in NAD.       MEDICATIONS  (STANDING):  chlorhexidine 2% Cloths 1 Application(s) Topical daily  enoxaparin Injectable 40 milliGRAM(s) SubCutaneous every 24 hours  folic acid 1 milliGRAM(s) Oral daily  multivitamin 1 Tablet(s) Oral daily  pantoprazole    Tablet 40 milliGRAM(s) Oral before breakfast  thiamine 100 milliGRAM(s) Oral daily    MEDICATIONS  (PRN):  haloperidol    Injectable 3 milliGRAM(s) IV Push every 8 hours PRN agitation      Allergies    No Known Allergies    Intolerances        Vital Signs Last 24 Hrs  T(C): 36.4 (31 Jul 2023 17:45), Max: 37 (31 Jul 2023 04:53)  T(F): 97.5 (31 Jul 2023 17:45), Max: 98.6 (31 Jul 2023 04:53)  HR: 80 (31 Jul 2023 17:45) (80 - 113)  BP: 118/74 (31 Jul 2023 17:45) (102/53 - 134/86)  BP(mean): --  RR: 17 (31 Jul 2023 17:45) (17 - 19)  SpO2: 98% (31 Jul 2023 17:45) (96% - 98%)    Parameters below as of 31 Jul 2023 04:53  Patient On (Oxygen Delivery Method): room air        PHYSICAL EXAM:  GENERAL: NAD   HEAD:  Atraumatic, Normocephalic  EYES: EOMI, PERRLA  NECK: Supple   NERVOUS SYSTEM:  Alert & confused  CHEST/LUNG: Clear to auscultation bilaterally; No rales, rhonchi, wheezing, or rubs  HEART: Regular rate and rhythm; No murmurs, rubs, or gallops  ABDOMEN: Soft, Nontender, Nondistended; Bowel sounds present  EXTREMITIES:   No clubbing, cyanosis, or edema    LABS:                        12.3   7.82  )-----------( 448      ( 30 Jul 2023 10:15 )             38.3     07-30    139  |  107  |  15  ----------------------------<  75  4.1   |  21<L>  |  0.87    Ca    9.3      30 Jul 2023 06:30  Phos  2.7     07-30  Mg     2.0     07-30    TPro  8.0  /  Alb  3.3  /  TBili  0.2  /  DBili  0.1  /  AST  37  /  ALT  41  /  AlkPhos  59  07-31      Urinalysis Basic - ( 30 Jul 2023 06:30 )    Color: x / Appearance: x / SG: x / pH: x  Gluc: 75 mg/dL / Ketone: x  / Bili: x / Urobili: x   Blood: x / Protein: x / Nitrite: x   Leuk Esterase: x / RBC: x / WBC x   Sq Epi: x / Non Sq Epi: x / Bacteria: x         RADIOLOGY & ADDITIONAL TESTS:    07-30-23 @ 07:01  -  07-31-23 @ 07:00  --------------------------------------------------------  IN:    Oral Fluid: 500 mL  Total IN: 500 mL    OUT:    Voided (mL): 0 mL  Total OUT: 0 mL    Total NET: 500 mL      07-31-23 @ 07:01  -  07-31-23 @ 18:26  --------------------------------------------------------  IN:    Oral Fluid: 500 mL  Total IN: 500 mL    OUT:  Total OUT: 0 mL    Total NET: 500 mL

## 2023-07-31 NOTE — BH CONSULTATION LIAISON ASSESSMENT NOTE - NSBHCHARTREVIEWVS_PSY_A_CORE FT
Vital Signs Last 24 Hrs  T(C): 36.7 (31 Jul 2023 11:46), Max: 37.1 (30 Jul 2023 17:07)  T(F): 98.1 (31 Jul 2023 11:46), Max: 98.8 (30 Jul 2023 17:07)  HR: 113 (31 Jul 2023 11:46) (91 - 113)  BP: 102/53 (31 Jul 2023 11:46) (102/53 - 134/86)  BP(mean): --  RR: 19 (31 Jul 2023 11:46) (17 - 19)  SpO2: 96% (31 Jul 2023 11:46) (96% - 96%)    Parameters below as of 31 Jul 2023 04:53  Patient On (Oxygen Delivery Method): room air

## 2023-07-31 NOTE — BH CONSULTATION LIAISON ASSESSMENT NOTE - CURRENT MEDICATION
MEDICATIONS  (STANDING):  chlorhexidine 2% Cloths 1 Application(s) Topical daily  enoxaparin Injectable 40 milliGRAM(s) SubCutaneous every 24 hours  folic acid 1 milliGRAM(s) Oral daily  multivitamin 1 Tablet(s) Oral daily  pantoprazole    Tablet 40 milliGRAM(s) Oral before breakfast  thiamine 100 milliGRAM(s) Oral daily    MEDICATIONS  (PRN):  haloperidol    Injectable 3 milliGRAM(s) IV Push every 8 hours PRN agitation

## 2023-07-31 NOTE — BH CONSULTATION LIAISON ASSESSMENT NOTE - NSBHCONSULTRECOMMENDOTHER_PSY_A_CORE FT
- bedside sono for residual q shift. 275cc at this time  - STAT LFTs, lactate within normal limits, awaiting ammonia level  - NO CAPACITY to leave AMA at this time  - continue 'wash out" given large amounts of medications received in the last 20 days   - maintains fall risk

## 2023-08-01 LAB
ANION GAP SERPL CALC-SCNC: 7 MMOL/L — SIGNIFICANT CHANGE UP (ref 5–17)
BUN SERPL-MCNC: 13 MG/DL — SIGNIFICANT CHANGE UP (ref 7–23)
CALCIUM SERPL-MCNC: 9 MG/DL — SIGNIFICANT CHANGE UP (ref 8.5–10.1)
CHLORIDE SERPL-SCNC: 108 MMOL/L — SIGNIFICANT CHANGE UP (ref 96–108)
CO2 SERPL-SCNC: 26 MMOL/L — SIGNIFICANT CHANGE UP (ref 22–31)
CREAT SERPL-MCNC: 1.04 MG/DL — SIGNIFICANT CHANGE UP (ref 0.5–1.3)
EGFR: 84 ML/MIN/1.73M2 — SIGNIFICANT CHANGE UP
GLUCOSE SERPL-MCNC: 173 MG/DL — HIGH (ref 70–99)
HCT VFR BLD CALC: 36.7 % — LOW (ref 39–50)
HGB BLD-MCNC: 11.2 G/DL — LOW (ref 13–17)
MAGNESIUM SERPL-MCNC: 2 MG/DL — SIGNIFICANT CHANGE UP (ref 1.6–2.6)
MCHC RBC-ENTMCNC: 23.4 PG — LOW (ref 27–34)
MCHC RBC-ENTMCNC: 30.5 G/DL — LOW (ref 32–36)
MCV RBC AUTO: 76.8 FL — LOW (ref 80–100)
NRBC # BLD: 0 /100 WBCS — SIGNIFICANT CHANGE UP (ref 0–0)
PHOSPHATE SERPL-MCNC: 2.8 MG/DL — SIGNIFICANT CHANGE UP (ref 2.5–4.5)
PLATELET # BLD AUTO: 440 K/UL — HIGH (ref 150–400)
POTASSIUM SERPL-MCNC: 3.1 MMOL/L — LOW (ref 3.5–5.3)
POTASSIUM SERPL-SCNC: 3.1 MMOL/L — LOW (ref 3.5–5.3)
RBC # BLD: 4.78 M/UL — SIGNIFICANT CHANGE UP (ref 4.2–5.8)
RBC # FLD: 22 % — HIGH (ref 10.3–14.5)
SODIUM SERPL-SCNC: 141 MMOL/L — SIGNIFICANT CHANGE UP (ref 135–145)
WBC # BLD: 3.12 K/UL — LOW (ref 3.8–10.5)
WBC # FLD AUTO: 3.12 K/UL — LOW (ref 3.8–10.5)

## 2023-08-01 PROCEDURE — 99231 SBSQ HOSP IP/OBS SF/LOW 25: CPT

## 2023-08-01 PROCEDURE — 99232 SBSQ HOSP IP/OBS MODERATE 35: CPT

## 2023-08-01 RX ORDER — IBUPROFEN 200 MG
400 TABLET ORAL ONCE
Refills: 0 | Status: COMPLETED | OUTPATIENT
Start: 2023-08-01 | End: 2023-08-01

## 2023-08-01 RX ORDER — POTASSIUM CHLORIDE 20 MEQ
40 PACKET (EA) ORAL EVERY 4 HOURS
Refills: 0 | Status: COMPLETED | OUTPATIENT
Start: 2023-08-01 | End: 2023-08-01

## 2023-08-01 RX ORDER — POTASSIUM CHLORIDE 20 MEQ
40 PACKET (EA) ORAL ONCE
Refills: 0 | Status: COMPLETED | OUTPATIENT
Start: 2023-08-01 | End: 2023-08-01

## 2023-08-01 RX ORDER — POTASSIUM CHLORIDE 20 MEQ
40 PACKET (EA) ORAL EVERY 4 HOURS
Refills: 0 | Status: DISCONTINUED | OUTPATIENT
Start: 2023-08-01 | End: 2023-08-01

## 2023-08-01 RX ADMIN — HALOPERIDOL DECANOATE 3 MILLIGRAM(S): 100 INJECTION INTRAMUSCULAR at 07:52

## 2023-08-01 RX ADMIN — Medication 1 MILLIGRAM(S): at 11:36

## 2023-08-01 RX ADMIN — Medication 400 MILLIGRAM(S): at 18:26

## 2023-08-01 RX ADMIN — HALOPERIDOL DECANOATE 3 MILLIGRAM(S): 100 INJECTION INTRAMUSCULAR at 21:28

## 2023-08-01 RX ADMIN — Medication 40 MILLIEQUIVALENT(S): at 21:28

## 2023-08-01 RX ADMIN — ENOXAPARIN SODIUM 40 MILLIGRAM(S): 100 INJECTION SUBCUTANEOUS at 18:07

## 2023-08-01 RX ADMIN — Medication 100 MILLIGRAM(S): at 11:36

## 2023-08-01 RX ADMIN — Medication 40 MILLIEQUIVALENT(S): at 13:16

## 2023-08-01 RX ADMIN — Medication 400 MILLIGRAM(S): at 19:22

## 2023-08-01 RX ADMIN — Medication 1 TABLET(S): at 11:36

## 2023-08-01 RX ADMIN — Medication 40 MILLIEQUIVALENT(S): at 18:08

## 2023-08-01 RX ADMIN — PANTOPRAZOLE SODIUM 40 MILLIGRAM(S): 20 TABLET, DELAYED RELEASE ORAL at 06:17

## 2023-08-01 NOTE — BH CONSULTATION LIAISON PROGRESS NOTE - NSBHCONSULTRECOMMENDOTHER_PSY_A_CORE FT
7/31/23: bedside sono for residual q shift. 275cc at this time  - STAT LFTs, lactate within normal limits, awaiting ammonia level  - NO CAPACITY to leave AMA at this time  - continue 'wash out" given large amounts of medications received in the last 20 days   - maintains fall risk   8/1/23: repeat labs within normal limits

## 2023-08-01 NOTE — BH CONSULTATION LIAISON PROGRESS NOTE - NSBHFUPINTERVALHXFT_PSY_A_CORE
Normal ammonia serum level on repeat. Thus far no lab abnormalities which would explain continued AMS.

## 2023-08-01 NOTE — PROGRESS NOTE ADULT - PROVIDER SPECIALTY LIST ADULT
Critical Care
Hospitalist
Internal Medicine
Critical Care
Hospitalist
Internal Medicine
Critical Care
Critical Care
Hospitalist
Internal Medicine
Internal Medicine
Hospitalist
Internal Medicine
Critical Care

## 2023-08-01 NOTE — PROGRESS NOTE ADULT - REASON FOR ADMISSION
ETOH withdrawal.

## 2023-08-01 NOTE — PROGRESS NOTE ADULT - ASSESSMENT
57 yo man w/ a history of HLD, alcohol abuse with hx of PUD/ gastritis, DTs requiring frequent ICU admissions for phenobarb and precedex presented with abdominal pain on 7/12 with Etoh level of 318 & was admitted for Etoh intox. Hospital stay was complicated by worsening DTs requiring ICU admission on 6/17 w/ Precedex drip & phenobarb. Pt's withdrawal improved and he was transferred to the medical floors on 6/24. Pt has been persistently delirious.      Delirium   - CT head on admission showed no acute intracranial hemorrhage, territorial infarct, mass effect or calvarial fracture  - s/p phenobarb for DTs  - stop ativan as it may worsen delirium  - stop Seroquel  - c/w PRN haldol & monitor QTc   - psych note read and appreciated   - c/w thiamine, folate, Multivitamin    Hypokalemia  - replace KCl    Thrombocytosis  - likely reactive  - will watch     Leukopenia POA  - resolved  - likely due to ETOH use    Prophylaxis:  DVT: Lovenox  GI: Protonix    frequent flier remains a code grey risk     Full code.

## 2023-08-01 NOTE — BH CONSULTATION LIAISON PROGRESS NOTE - NSBHCHARTREVIEWLAB_PSY_A_CORE FT
08-01    141  |  108  |  13  ----------------------------<  173<H>  3.1<L>   |  26  |  1.04    Ca    9.0      01 Aug 2023 10:35  Phos  2.8     08-01  Mg     2.0     08-01    TPro  8.0  /  Alb  3.3  /  TBili  0.2  /  DBili  0.1  /  AST  37  /  ALT  41  /  AlkPhos  59  07-31

## 2023-08-01 NOTE — PROGRESS NOTE ADULT - NUTRITIONAL ASSESSMENT
This patient has been assessed with a concern for Malnutrition and has been determined to have a diagnosis/diagnoses of Moderate protein-calorie malnutrition.    This patient is being managed with:   Diet Soft and Bite Sized-  Supplement Feeding Modality:  Oral  Ensure Plus High Protein Cans or Servings Per Day:  1       Frequency:  Two Times a day  Entered: Jul 29 2023 10:57AM  
This patient has been assessed with a concern for Malnutrition and has been determined to have a diagnosis/diagnoses of Moderate protein-calorie malnutrition.    This patient is being managed with:   Diet Soft and Bite Sized-  Supplement Feeding Modality:  Oral  Ensure Plus High Protein Cans or Servings Per Day:  1       Frequency:  Two Times a day  Entered: Jul 29 2023 10:57AM    Diet Soft and Bite Sized-  Entered: Jul 20 2023  3:47PM    The following pending diet order is being considered for treatment of Moderate protein-calorie malnutrition:null
This patient has been assessed with a concern for Malnutrition and has been determined to have a diagnosis/diagnoses of Moderate protein-calorie malnutrition.    This patient is being managed with:   Diet Soft and Bite Sized-  Supplement Feeding Modality:  Oral  Ensure Plus High Protein Cans or Servings Per Day:  1       Frequency:  Two Times a day  Entered: Jul 29 2023 10:57AM  
This patient has been assessed with a concern for Malnutrition and has been determined to have a diagnosis/diagnoses of Moderate protein-calorie malnutrition.    This patient is being managed with:   Diet Soft and Bite Sized-  Supplement Feeding Modality:  Oral  Ensure Plus High Protein Cans or Servings Per Day:  1       Frequency:  Two Times a day  Entered: Jul 29 2023 10:57AM

## 2023-08-01 NOTE — BH CONSULTATION LIAISON PROGRESS NOTE - NSBHCHARTREVIEWVS_PSY_A_CORE FT
Vital Signs Last 24 Hrs  T(C): 37.1 (01 Aug 2023 05:00), Max: 37.1 (01 Aug 2023 05:00)  T(F): 98.7 (01 Aug 2023 05:00), Max: 98.7 (01 Aug 2023 05:00)  HR: 66 (01 Aug 2023 05:00) (66 - 113)  BP: 115/75 (01 Aug 2023 05:00) (102/53 - 118/74)  BP(mean): --  RR: 17 (01 Aug 2023 05:00) (17 - 19)  SpO2: 98% (31 Jul 2023 17:45) (96% - 98%)    Parameters below as of 01 Aug 2023 05:00  Patient On (Oxygen Delivery Method): room air

## 2023-08-01 NOTE — PROGRESS NOTE ADULT - SUBJECTIVE AND OBJECTIVE BOX
59 yo man w/ a history of HLD, alcohol abuse with hx of PUD/ gastritis, DTs requiring frequent ICU admissions for phenobarb and precedex presented with abdominal pain on 7/12 with Etoh level of 318 & was admitted for Etoh intox. Hospital stay was complicated by worsening DTs requiring ICU admission on 6/17 w/ Precedex drip & phenobarb. Pt's withdrawal improved and he was transferred to the medical floors on 6/24. Pt has been persistently delirious. He is lying in bed in NAD.        MEDICATIONS  (STANDING):  chlorhexidine 2% Cloths 1 Application(s) Topical daily  enoxaparin Injectable 40 milliGRAM(s) SubCutaneous every 24 hours  folic acid 1 milliGRAM(s) Oral daily  multivitamin 1 Tablet(s) Oral daily  pantoprazole    Tablet 40 milliGRAM(s) Oral before breakfast  thiamine 100 milliGRAM(s) Oral daily    MEDICATIONS  (PRN):  haloperidol    Injectable 3 milliGRAM(s) IV Push every 8 hours PRN agitation      Allergies    No Known Allergies    Intolerances        Vital Signs Last 24 Hrs  T(C): 36.7 (01 Aug 2023 17:45), Max: 37.1 (01 Aug 2023 05:00)  T(F): 98 (01 Aug 2023 17:45), Max: 98.7 (01 Aug 2023 05:00)  HR: 78 (01 Aug 2023 17:45) (60 - 78)  BP: 144/91 (01 Aug 2023 17:45) (115/75 - 144/91)   RR: 17 (01 Aug 2023 17:45) (17 - 18)  SpO2: 98% (01 Aug 2023 17:45) (98% - 98%)    Parameters below as of 01 Aug 2023 14:31  Patient On (Oxygen Delivery Method): room air        PHYSICAL EXAM:  GENERAL: NAD   HEAD:  Atraumatic, Normocephalic  EYES: EOMI, PERRLA  NECK: Supple   NERVOUS SYSTEM:  Alert & confused  CHEST/LUNG: Clear to auscultation bilaterally; No rales, rhonchi, wheezing, or rubs  HEART: Regular rate and rhythm; No murmurs, rubs, or gallops  ABDOMEN: Soft, Nontender, Nondistended; Bowel sounds present  EXTREMITIES:   No clubbing, cyanosis, or edema    LABS:                        11.2   3.12  )-----------( 440      ( 01 Aug 2023 10:35 )             36.7     08-01    141  |  108  |  13  ----------------------------<  173<H>  3.1<L>   |  26  |  1.04    Ca    9.0      01 Aug 2023 10:35  Phos  2.8     08-01  Mg     2.0     08-01    TPro  8.0  /  Alb  3.3  /  TBili  0.2  /  DBili  0.1  /  AST  37  /  ALT  41  /  AlkPhos  59  07-31      Urinalysis Basic - ( 01 Aug 2023 10:35 )    Color: x / Appearance: x / SG: x / pH: x  Gluc: 173 mg/dL / Ketone: x  / Bili: x / Urobili: x   Blood: x / Protein: x / Nitrite: x   Leuk Esterase: x / RBC: x / WBC x   Sq Epi: x / Non Sq Epi: x / Bacteria: x           RADIOLOGY & ADDITIONAL TESTS:    07-31-23 @ 07:01  -  08-01-23 @ 07:00  --------------------------------------------------------  IN:    Oral Fluid: 1040 mL  Total IN: 1040 mL    OUT:    Blood Loss (mL): 0 mL    Incontinent per Condom Catheter (mL): 3 mL  Total OUT: 3 mL    Total NET: 1037 mL

## 2023-08-02 ENCOUNTER — TRANSCRIPTION ENCOUNTER (OUTPATIENT)
Age: 58
End: 2023-08-02

## 2023-08-02 VITALS
DIASTOLIC BLOOD PRESSURE: 77 MMHG | RESPIRATION RATE: 16 BRPM | TEMPERATURE: 98 F | OXYGEN SATURATION: 97 % | SYSTOLIC BLOOD PRESSURE: 122 MMHG | HEART RATE: 82 BPM

## 2023-08-02 LAB
ANION GAP SERPL CALC-SCNC: 4 MMOL/L — LOW (ref 5–17)
BUN SERPL-MCNC: 12 MG/DL — SIGNIFICANT CHANGE UP (ref 7–23)
CALCIUM SERPL-MCNC: 9.2 MG/DL — SIGNIFICANT CHANGE UP (ref 8.5–10.1)
CHLORIDE SERPL-SCNC: 110 MMOL/L — HIGH (ref 96–108)
CO2 SERPL-SCNC: 25 MMOL/L — SIGNIFICANT CHANGE UP (ref 22–31)
CREAT SERPL-MCNC: 0.8 MG/DL — SIGNIFICANT CHANGE UP (ref 0.5–1.3)
EGFR: 104 ML/MIN/1.73M2 — SIGNIFICANT CHANGE UP
GLUCOSE SERPL-MCNC: 83 MG/DL — SIGNIFICANT CHANGE UP (ref 70–99)
MAGNESIUM SERPL-MCNC: 2 MG/DL — SIGNIFICANT CHANGE UP (ref 1.6–2.6)
PHOSPHATE SERPL-MCNC: 3 MG/DL — SIGNIFICANT CHANGE UP (ref 2.5–4.5)
POTASSIUM SERPL-MCNC: 4.5 MMOL/L — SIGNIFICANT CHANGE UP (ref 3.5–5.3)
POTASSIUM SERPL-SCNC: 4.5 MMOL/L — SIGNIFICANT CHANGE UP (ref 3.5–5.3)
SODIUM SERPL-SCNC: 139 MMOL/L — SIGNIFICANT CHANGE UP (ref 135–145)

## 2023-08-02 PROCEDURE — 99239 HOSP IP/OBS DSCHRG MGMT >30: CPT

## 2023-08-02 RX ORDER — MORPHINE SULFATE 50 MG/1
2 CAPSULE, EXTENDED RELEASE ORAL ONCE
Refills: 0 | Status: DISCONTINUED | OUTPATIENT
Start: 2023-08-02 | End: 2023-08-02

## 2023-08-02 RX ADMIN — HALOPERIDOL DECANOATE 3 MILLIGRAM(S): 100 INJECTION INTRAMUSCULAR at 08:29

## 2023-08-02 RX ADMIN — PANTOPRAZOLE SODIUM 40 MILLIGRAM(S): 20 TABLET, DELAYED RELEASE ORAL at 06:09

## 2023-08-02 RX ADMIN — MORPHINE SULFATE 2 MILLIGRAM(S): 50 CAPSULE, EXTENDED RELEASE ORAL at 02:50

## 2023-08-02 RX ADMIN — Medication 100 MILLIGRAM(S): at 11:52

## 2023-08-02 RX ADMIN — Medication 1 TABLET(S): at 11:52

## 2023-08-02 RX ADMIN — MORPHINE SULFATE 2 MILLIGRAM(S): 50 CAPSULE, EXTENDED RELEASE ORAL at 03:51

## 2023-08-02 RX ADMIN — CHLORHEXIDINE GLUCONATE 1 APPLICATION(S): 213 SOLUTION TOPICAL at 11:53

## 2023-08-02 RX ADMIN — Medication 1 MILLIGRAM(S): at 11:52

## 2023-08-02 NOTE — DISCHARGE NOTE PROVIDER - DETAILS OF MALNUTRITION DIAGNOSIS/DIAGNOSES
This patient has been assessed with a concern for Malnutrition and was treated during this hospitalization for the following Nutrition diagnosis/diagnoses:     -  07/29/2023: Moderate protein-calorie malnutrition

## 2023-08-02 NOTE — DISCHARGE NOTE NURSING/CASE MANAGEMENT/SOCIAL WORK - PATIENT PORTAL LINK FT
You can access the FollowMyHealth Patient Portal offered by [facility] by registering at the following website: [email or web address]. By joining [employer]’s FollowMyHealth portal, you will also be able to view your health information using other applications (apps) compatible with our system.

## 2023-08-02 NOTE — DISCHARGE NOTE NURSING/CASE MANAGEMENT/SOCIAL WORK - NSDCVIVACCINE_GEN_ALL_CORE_FT
COVID-19, mRNA, LNP-S, PF, 100 mcg/ 0.5 mL dose (Moderna); 10-Jovan-2021 15:38; Reji Hernandez (RN); [employer] [id number] (Exp. Date: 13-Jul-2021); IntraMuscular; Deltoid Right.; 0.5 milliLiter(s);   influenza, injectable, quadrivalent, preservative free; 31-Jan-2019 15:53; Ayaka Bynum (RN); [employer]; [id number] (Exp. Date: 30-Jun-2019); IntraMuscular; Deltoid Right.; 0.5 milliLiter(s); VIS (VIS Published: 07-Aug-2015, VIS Presented: 31-Jan-2019);   influenza, injectable, quadrivalent, preservative free; 10-Nov-2019 14:13; Laverne Lane (RN); [employer]; [id number] (Exp. Date: 30-Jun-2020); IntraMuscular; Deltoid Left.; 0.5 milliLiter(s); VIS (VIS Published: 15-Aug-2019, VIS Presented: 10-Nov-2019);   influenza, injectable, quadrivalent, preservative free; 13-Oct-2020 11:56; Kimberli Cronin (RN); [employer]; [id number] (Exp. Date: 30-Jun-2021); IntraMuscular; Deltoid Right.; 0.5 milliLiter(s); VIS (VIS Published: 15-Aug-2019, VIS Presented: 13-Oct-2020);   Td (adult) preservative free; 18-Jan-2020 20:04; Yulia Vance (RN); [employer]; [id number] (Exp. Date: 19-Jan-2021); IntraMuscular; Deltoid Right.; 0.5 milliLiter(s); VIS (VIS Published: 18-Jan-2020, VIS Presented: 18-Jan-2020);

## 2023-08-02 NOTE — DISCHARGE NOTE NURSING/CASE MANAGEMENT/SOCIAL WORK - NURSING SECTION COMPLETE
Patient/Caregiver provided printed discharge information.
owns RW, SAC, commode, raised toilet seat, shower chair, and shower bars.

## 2023-08-02 NOTE — DISCHARGE NOTE PROVIDER - HOSPITAL COURSE
59 yo man w/ a history of HLD, alcohol abuse with hx of PUD/ gastritis, DTs requiring frequent ICU admissions for phenobarb and precedex presented with abdominal pain on 7/12 with Etoh level of 318 & was admitted for Etoh intox. Hospital stay was complicated by worsening DTs requiring ICU admission on 6/17 w/ Precedex drip & phenobarb. Pt's withdrawal improved and he was transferred to the medical floors on 6/24 where he was persistently delirious. CT head on admission showed no acute intracranial hemorrhage, territorial infarct, mass effect or calvarial fracture. Pt's mental status has improved, though he clearly has had cognitive decline due to years of ETOH use and likely has underlying dementia.     Discharge time: 43 minutes     Vital Signs Last 24 Hrs  T(C): 36.9 (02 Aug 2023 10:58), Max: 37 (02 Aug 2023 05:15)  T(F): 98.4 (02 Aug 2023 10:58), Max: 98.6 (02 Aug 2023 05:15)  HR: 62 (02 Aug 2023 10:58) (60 - 78)  BP: 107/72 (02 Aug 2023 10:58) (107/72 - 147/87)  RR: 18 (02 Aug 2023 10:58) (17 - 18)  SpO2: 97% (02 Aug 2023 10:58) (97% - 98%)    Parameters below as of 02 Aug 2023 10:58  Patient On (Oxygen Delivery Method): room air    PHYSICAL EXAM:  GENERAL: NAD   HEAD:  Atraumatic, Normocephalic  EYES: EOMI, PERRLA  NECK: Supple   NERVOUS SYSTEM:  Alert & confused  CHEST/LUNG: Clear to auscultation bilaterally; No rales, rhonchi, wheezing, or rubs  HEART: Regular rate and rhythm; No murmurs, rubs, or gallops  ABDOMEN: Soft, Nontender, Nondistended; Bowel sounds present  EXTREMITIES:   No clubbing, cyanosis, or edema

## 2023-08-04 DIAGNOSIS — D75.838 OTHER THROMBOCYTOSIS: ICD-10-CM

## 2023-08-04 DIAGNOSIS — Z87.11 PERSONAL HISTORY OF PEPTIC ULCER DISEASE: ICD-10-CM

## 2023-08-04 DIAGNOSIS — Z79.899 OTHER LONG TERM (CURRENT) DRUG THERAPY: ICD-10-CM

## 2023-08-04 DIAGNOSIS — Z71.41 ALCOHOL ABUSE COUNSELING AND SURVEILLANCE OF ALCOHOLIC: ICD-10-CM

## 2023-08-04 DIAGNOSIS — F10.239 ALCOHOL DEPENDENCE WITH WITHDRAWAL, UNSPECIFIED: ICD-10-CM

## 2023-08-04 DIAGNOSIS — D72.819 DECREASED WHITE BLOOD CELL COUNT, UNSPECIFIED: ICD-10-CM
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
## 2023-08-04 DIAGNOSIS — E78.5 HYPERLIPIDEMIA, UNSPECIFIED: ICD-10-CM

## 2023-08-04 DIAGNOSIS — E87.6 HYPOKALEMIA: ICD-10-CM

## 2023-08-04 DIAGNOSIS — G92.8 OTHER TOXIC ENCEPHALOPATHY: ICD-10-CM

## 2023-08-04 DIAGNOSIS — E44.0 MODERATE PROTEIN-CALORIE MALNUTRITION: ICD-10-CM

## 2023-08-04 DIAGNOSIS — F10.221 ALCOHOL DEPENDENCE WITH INTOXICATION DELIRIUM: ICD-10-CM

## 2023-08-04 DIAGNOSIS — R45.851 SUICIDAL IDEATIONS: ICD-10-CM

## 2023-08-04 DIAGNOSIS — F10.231 ALCOHOL DEPENDENCE WITH WITHDRAWAL DELIRIUM: ICD-10-CM

## 2023-08-13 ENCOUNTER — EMERGENCY (EMERGENCY)
Facility: HOSPITAL | Age: 58
LOS: 0 days | Discharge: HOME | End: 2023-08-14
Attending: STUDENT IN AN ORGANIZED HEALTH CARE EDUCATION/TRAINING PROGRAM
Payer: MEDICAID

## 2023-08-13 VITALS
TEMPERATURE: 99 F | SYSTOLIC BLOOD PRESSURE: 137 MMHG | HEIGHT: 67 IN | HEART RATE: 106 BPM | WEIGHT: 173.06 LBS | RESPIRATION RATE: 18 BRPM | OXYGEN SATURATION: 97 % | DIASTOLIC BLOOD PRESSURE: 90 MMHG

## 2023-08-13 DIAGNOSIS — D75.839 THROMBOCYTOSIS, UNSPECIFIED: ICD-10-CM

## 2023-08-13 DIAGNOSIS — F10.229 ALCOHOL DEPENDENCE WITH INTOXICATION, UNSPECIFIED: ICD-10-CM

## 2023-08-13 DIAGNOSIS — D72.819 DECREASED WHITE BLOOD CELL COUNT, UNSPECIFIED: ICD-10-CM

## 2023-08-13 DIAGNOSIS — Z86.59 PERSONAL HISTORY OF OTHER MENTAL AND BEHAVIORAL DISORDERS: ICD-10-CM

## 2023-08-13 DIAGNOSIS — M79.10 MYALGIA, UNSPECIFIED SITE: ICD-10-CM

## 2023-08-13 DIAGNOSIS — R10.9 UNSPECIFIED ABDOMINAL PAIN: ICD-10-CM

## 2023-08-13 DIAGNOSIS — F05 DELIRIUM DUE TO KNOWN PHYSIOLOGICAL CONDITION: ICD-10-CM

## 2023-08-13 DIAGNOSIS — E78.2 MIXED HYPERLIPIDEMIA: ICD-10-CM

## 2023-08-13 DIAGNOSIS — E87.6 HYPOKALEMIA: ICD-10-CM

## 2023-08-13 DIAGNOSIS — Z87.11 PERSONAL HISTORY OF PEPTIC ULCER DISEASE: ICD-10-CM

## 2023-08-13 DIAGNOSIS — Z87.19 PERSONAL HISTORY OF OTHER DISEASES OF THE DIGESTIVE SYSTEM: ICD-10-CM

## 2023-08-13 PROCEDURE — 99285 EMERGENCY DEPT VISIT HI MDM: CPT

## 2023-08-13 NOTE — ED ADULT TRIAGE NOTE - CHIEF COMPLAINT QUOTE
Patient BIBA for chronic abdominal pain. Last alcoholic drink last night. Patient BIBA for chronic abdominal pain. Last alcoholic drink last night.  pmhx: ETOH abuse, gastritis.

## 2023-08-14 VITALS
TEMPERATURE: 99 F | SYSTOLIC BLOOD PRESSURE: 138 MMHG | RESPIRATION RATE: 18 BRPM | HEART RATE: 63 BPM | DIASTOLIC BLOOD PRESSURE: 92 MMHG | OXYGEN SATURATION: 98 %

## 2023-08-14 LAB
ALBUMIN SERPL ELPH-MCNC: 3.4 G/DL — SIGNIFICANT CHANGE UP (ref 3.3–5)
ALP SERPL-CCNC: 49 U/L — SIGNIFICANT CHANGE UP (ref 40–120)
ALT FLD-CCNC: 36 U/L — SIGNIFICANT CHANGE UP (ref 12–78)
ANION GAP SERPL CALC-SCNC: 6 MMOL/L — SIGNIFICANT CHANGE UP (ref 5–17)
AST SERPL-CCNC: 31 U/L — SIGNIFICANT CHANGE UP (ref 15–37)
BASOPHILS # BLD AUTO: 0.05 K/UL — SIGNIFICANT CHANGE UP (ref 0–0.2)
BASOPHILS NFR BLD AUTO: 1.2 % — SIGNIFICANT CHANGE UP (ref 0–2)
BILIRUB SERPL-MCNC: 0.2 MG/DL — SIGNIFICANT CHANGE UP (ref 0.2–1.2)
BUN SERPL-MCNC: 14 MG/DL — SIGNIFICANT CHANGE UP (ref 7–23)
CALCIUM SERPL-MCNC: 8.2 MG/DL — LOW (ref 8.5–10.1)
CHLORIDE SERPL-SCNC: 112 MMOL/L — HIGH (ref 96–108)
CK SERPL-CCNC: 145 U/L — SIGNIFICANT CHANGE UP (ref 26–308)
CO2 SERPL-SCNC: 24 MMOL/L — SIGNIFICANT CHANGE UP (ref 22–31)
CREAT SERPL-MCNC: 0.89 MG/DL — SIGNIFICANT CHANGE UP (ref 0.5–1.3)
EGFR: 101 ML/MIN/1.73M2 — SIGNIFICANT CHANGE UP
EOSINOPHIL # BLD AUTO: 0.16 K/UL — SIGNIFICANT CHANGE UP (ref 0–0.5)
EOSINOPHIL NFR BLD AUTO: 3.9 % — SIGNIFICANT CHANGE UP (ref 0–6)
ETHANOL SERPL-MCNC: 231 MG/DL — HIGH (ref 0–10)
GLUCOSE SERPL-MCNC: 108 MG/DL — HIGH (ref 70–99)
HCT VFR BLD CALC: 40.2 % — SIGNIFICANT CHANGE UP (ref 39–50)
HGB BLD-MCNC: 12.4 G/DL — LOW (ref 13–17)
IMM GRANULOCYTES NFR BLD AUTO: 0.2 % — SIGNIFICANT CHANGE UP (ref 0–0.9)
LIDOCAIN IGE QN: 468 U/L — HIGH (ref 73–393)
LYMPHOCYTES # BLD AUTO: 2.42 K/UL — SIGNIFICANT CHANGE UP (ref 1–3.3)
LYMPHOCYTES # BLD AUTO: 59.6 % — HIGH (ref 13–44)
MAGNESIUM SERPL-MCNC: 1.9 MG/DL — SIGNIFICANT CHANGE UP (ref 1.6–2.6)
MCHC RBC-ENTMCNC: 23.4 PG — LOW (ref 27–34)
MCHC RBC-ENTMCNC: 30.8 G/DL — LOW (ref 32–36)
MCV RBC AUTO: 75.7 FL — LOW (ref 80–100)
MONOCYTES # BLD AUTO: 0.4 K/UL — SIGNIFICANT CHANGE UP (ref 0–0.9)
MONOCYTES NFR BLD AUTO: 9.9 % — SIGNIFICANT CHANGE UP (ref 2–14)
NEUTROPHILS # BLD AUTO: 1.02 K/UL — LOW (ref 1.8–7.4)
NEUTROPHILS NFR BLD AUTO: 25.2 % — LOW (ref 43–77)
NRBC # BLD: 0 /100 WBCS — SIGNIFICANT CHANGE UP (ref 0–0)
PLATELET # BLD AUTO: 364 K/UL — SIGNIFICANT CHANGE UP (ref 150–400)
POTASSIUM SERPL-MCNC: 4.1 MMOL/L — SIGNIFICANT CHANGE UP (ref 3.5–5.3)
POTASSIUM SERPL-SCNC: 4.1 MMOL/L — SIGNIFICANT CHANGE UP (ref 3.5–5.3)
PROT SERPL-MCNC: 7.8 GM/DL — SIGNIFICANT CHANGE UP (ref 6–8.3)
RBC # BLD: 5.31 M/UL — SIGNIFICANT CHANGE UP (ref 4.2–5.8)
RBC # FLD: 21.1 % — HIGH (ref 10.3–14.5)
SODIUM SERPL-SCNC: 142 MMOL/L — SIGNIFICANT CHANGE UP (ref 135–145)
WBC # BLD: 4.06 K/UL — SIGNIFICANT CHANGE UP (ref 3.8–10.5)
WBC # FLD AUTO: 4.06 K/UL — SIGNIFICANT CHANGE UP (ref 3.8–10.5)

## 2023-08-14 RX ORDER — SODIUM CHLORIDE 9 MG/ML
1000 INJECTION, SOLUTION INTRAVENOUS ONCE
Refills: 0 | Status: COMPLETED | OUTPATIENT
Start: 2023-08-14 | End: 2023-08-14

## 2023-08-14 RX ORDER — MORPHINE SULFATE 50 MG/1
4 CAPSULE, EXTENDED RELEASE ORAL ONCE
Refills: 0 | Status: DISCONTINUED | OUTPATIENT
Start: 2023-08-14 | End: 2023-08-14

## 2023-08-14 RX ORDER — KETOROLAC TROMETHAMINE 30 MG/ML
15 SYRINGE (ML) INJECTION ONCE
Refills: 0 | Status: DISCONTINUED | OUTPATIENT
Start: 2023-08-14 | End: 2023-08-14

## 2023-08-14 RX ADMIN — Medication 1 MILLIGRAM(S): at 08:36

## 2023-08-14 RX ADMIN — MORPHINE SULFATE 4 MILLIGRAM(S): 50 CAPSULE, EXTENDED RELEASE ORAL at 02:32

## 2023-08-14 RX ADMIN — MORPHINE SULFATE 4 MILLIGRAM(S): 50 CAPSULE, EXTENDED RELEASE ORAL at 03:49

## 2023-08-14 RX ADMIN — Medication 15 MILLIGRAM(S): at 07:15

## 2023-08-14 RX ADMIN — Medication 50 MILLIGRAM(S): at 06:28

## 2023-08-14 RX ADMIN — Medication 15 MILLIGRAM(S): at 06:29

## 2023-08-14 RX ADMIN — SODIUM CHLORIDE 1000 MILLILITER(S): 9 INJECTION, SOLUTION INTRAVENOUS at 02:32

## 2023-08-14 NOTE — ED ADULT NURSE NOTE - CHIEF COMPLAINT QUOTE
Patient BIBA for chronic abdominal pain. Last alcoholic drink last night.  pmhx: ETOH abuse, gastritis.

## 2023-08-14 NOTE — ED PROVIDER NOTE - CONSIDERATION OF ADMISSION OBSERVATION
patient symptoms resolved as he sobered up  safe for discharge with family Consideration of Admission/Observation

## 2023-08-14 NOTE — ED PROVIDER NOTE - PHYSICAL EXAMINATION
General: Well appearing male in no acute distress  HEENT: Normocephalic, atraumatic. Moist mucous membranes. Oropharynx clear. No lymphadenopathy.  Eyes: No scleral icterus. EOMI. ANDREA.  Neck:. Soft and supple. Full ROM without pain. No midline tenderness  Cardiac: Regular rate and regular rhythm. No murmurs, rubs, gallops. Peripheral pulses 2+ and symmetric. No LE edema.  Resp: Lungs CTAB. Speaking in full sentences. No wheezes, rales or rhonchi.  Abd: Soft, non-tender, non-distended. No guarding or rebound. No scars, masses, or lesions.  Back: Spine midline and non-tender. No CVA tenderness.    Skin: No rashes, abrasions, or lacerations.  Neuro: AO x 3. Moves all extremities symmetrically. Motor strength and sensation grossly intact. ambulatory w. steady gait

## 2023-08-14 NOTE — ED ADULT NURSE NOTE - OBJECTIVE STATEMENT
Pt AAOx4. 58 year old male with past medical history of EtOH abuse, gastritis; Patient comes into [facility] ED often for the same complaint and is again presenting to ED with complaint of all over body pain and abdominal pain. Patient states his last alcoholic drink was last night. Denies chest pain, shortness of breath, vomiting, diarrhea, fever, chills, hematuria, dysuria, hematochezia, dyschezia. Respirations equal and unlabored, no acute distress noted at this time.

## 2023-08-14 NOTE — ED PROVIDER NOTE - OBJECTIVE STATEMENT
58 M pmh EtOH abuse and dependence presenting to the ED for abdominal pain and body aches. Patient states that he had multiple drinks earlier this evening. He denies falling, chest pain or shortness of breath.

## 2023-08-14 NOTE — ED PROVIDER NOTE - PATIENT PORTAL LINK FT
You can access the FollowMyHealth Patient Portal offered by [facility] by registering at the following website: [email or web address]. By joining [employer]’s FollowMyHealth portal, you will also be able to view your health information using other applications (apps) compatible with our system.

## 2023-08-14 NOTE — ED PROVIDER NOTE - CLINICAL SUMMARY MEDICAL DECISION MAKING FREE TEXT BOX
58 M presenting to the ED for abdominal pain in the setting of alcohol intoxication    labs wnl  patient pain controlled in ED  ambulatory w/ steady gait  librium  dispo home

## 2023-08-14 NOTE — ED PROVIDER NOTE - NS ED ROS FT
General: Denies fever, chills  HEENT: Denies sensory changes, sore throat  Neck: Denies neck pain, neck stiffness  Resp: Denies coughing, SOB  Cardiovascular: Denies CP, palpitations, LE edema  GI: Denies nausea, vomiting, + abdominal pain  : Denies dysuria, hematuria, frequency, incontinence  MSK: + myalgias  Neuro: Denies HA, dizziness, numbness, weakness  Skin: Denies rashes.

## 2023-08-14 NOTE — ED ADULT NURSE NOTE - NSFALLUNIVINTERV_ED_ALL_ED
Bed/Stretcher in lowest position, wheels locked, appropriate side rails in place/Call bell, personal items and telephone in reach/Instruct patient to call for assistance before getting out of bed/chair/stretcher/Non-slip footwear applied when patient is off stretcher/[place] to call system/Physically safe environment - no spills, clutter or unnecessary equipment/Purposeful proactive rounding/Room/bathroom lighting operational, light cord in reach

## 2023-08-16 NOTE — PATIENT PROFILE ADULT - ARE SIGNIFICANT INDICATORS COMPLETE.
October 12, 2018      Siva Esparza MD  [address] [facility] [address]  [facility] [address]           [facility]  [address] [place] [id number]  Phone: [phone]  Fax: [phone]          Patient: Ac Hayden   MR Number: [id number]   YOB: 1963   Date of Visit: 10/12/2018       Dear Dr. Siva Esparza:    Thank you for referring Ac Hayden to me for evaluation. Attached you will find relevant portions of my assessment and plan of care.    If you have questions, please do not hesitate to call me. I look forward to following Ac Hayden along with you.    Sincerely,    EVA Ayala, OD    Enclosure  CC:  No Recipients    If you would like to receive this communication electronically, please contact [email or web address] or [phone] to request more information on [employer] access.    For providers and/or their staff who would like to refer a patient to [facility], please contact us through our one-stop-shop provider referral line, [facility], at [phone].    If you feel you have received this communication in error or would no longer like to receive these types of communications, please e-mail [email or web address]          No Breech presentation Yes

## 2023-08-24 NOTE — ED ADULT NURSE NOTE - OBJECTIVE STATEMENT
Eitan pt a& O x3, shaking, pt history alochol abuse, last dirnk 1 bottle vodka yesterday unsure time. pt denies drug use. pt N/V at this time bile present in vomit., pt c.o of withdrawals, headache, chest pain, back pain, shaking.

## 2023-09-01 NOTE — ED ADULT NURSE NOTE - PMH
Nutrition Assessment   Reason for Consult/Assessment: Initial, Wound consult, Nursing nutrition screen (MST)      Diagnosis and Hx: Reviewed    Pertinent Nutrition History: Patient admitted with rib fractures and liver laceration after a fall at home. Surgery for rib fractures was performed on 8/31. Past medical history significant for HTN, GERD, HLD, COPD.    Pertinent Nutrition Information: Wound consult received per trauma protocol.                                 Diet Order: Consistent carbohydrate                  Diet tolerance: Tolerating with fair appetite/intakes recorded   Food Allergies: None known    Demographic/Anthropometrics Information  Gender: female  Patient Age: 51 year old  Height:   Ht Readings from Last 1 Encounters:   09/01/23 5' 5\" (1.651 m)      Weight:   Wt Readings from Last 1 Encounters:   09/01/23 75 kg      BMI:   BMI Readings from Last 1 Encounters:   09/01/23 27.51 kg/m²          % Weight Change: Patient reports weight loss of 34 lbs. EMR shows stable weight in last few years.  Weight change significant: No        Estimated Needs:  Calculated Energy Needs: 8069-6734  kcal               Calculated protein needs: 112-150  g    Calculated Fluid Needs: 1ml/kcal              NFPE  Nutrition Focused Physical Exam  Physical Exam Completed: No  Reason Not Completed: Other (not appropriate s/p surgery)                      TREATMENT PLAN: Monitoring & Interventions   Intervention: Meals and snacks, Nutrition supplement therapy         Meals & snacks: Will monitor PO intake. Currently eating 50-70% of meals.                                                           Nutrition supplement therapy: Will consider adding a supplement if intake decreases.       Goal: Meet >/equal 75% estimated needs   Intervention goal status: Initiated  Time frame to achieve goal: 3-5 days     Dietitian will monitor: Anthropometric Measurements, Food, beverage, and nutrient intake            Nutrition Diagnosis /  PES  Nutrition Diagnosis: Increased nutrient needs  Related to: Increased nutrition demands s/p surgery   As evidenced by: Calculated needs      Primary Nutrition Diagnosis status: New nutrition diagnosis                  Mixed hyperlipidemia    PUD (peptic ulcer disease)

## 2023-10-04 NOTE — ED ADULT NURSE NOTE - DRUG PRE-SCREENING (DAST -1)
Pharmacy Monitoring - Allogeneic Transplant     Day +344 s/p MAC FluBu + Tacro/MTX     Day 0 = 10/25/2022  ----------------------------------  Tacrolimus Monitoring     Target therapeutic range: 5-8 ng/mL  Current dose: 0.5 mg by mouth every AM and 0.5 mg every PM  Estimated Creatinine Clearance: 59 mL/min (A) (by C-G formula based on SCr of 1.6 mg/dL (H)).      Tacrolimus level: 10 ng/mL    Drug-drug interactions:  - Fluconazole started 10/26     Other factors affecting tacrolimus levels:  - None     Tacro dose: Continue 0.5 mg by mouth every AM and 0.5 mg every PM     Rationale: Patient had confusion regarding his meds while in clinic and was unable to communicate whether tacrolimus was taken this morning prior to lab draw. Will continue to keep dosing the same as it is close to previous 9.4 ng/mL prior level without dose change.     Preferred communication method: 11/28 Updated to only call if tacrolimus dose requires a change or any other medication changes otherwise the [employer] orly is fine. 05/30 Per patient, his son will now start helping with med management.    Magnesium Supplementation:  • 11/21 Mag 1 pill BID starting  • 11/23 inc to 1 pill TID  • 12/12 2 AM, 1 noon, 2 PM  • 01/16 2 pills TID  • 9/12/2023 2 pills daily every AM     Next level due: 10/9/2023  ----------------------------------  Additional Monitoring:     Next Pentamidine due: 10/25/2023      VOD ppx   • Ursodiol 10/18 - D+45 (~12/10)     Antifungal monitoring (Aspergillus/1,3 Beta D Glucan)  • N/A     BK Virus  • N/A     CMV Reactivation  • N/A    EBV Reactivation  • N/A    VZV Reactivation  · Concern for VZV in differential for leg rash, increased Valtrex to 1 g TID x 10 days -> 03/10 back to valtrex ppx dose -> 04/10 concern for viral reactivation due to mucocutaneous lesions, increased to 1 g BID x 10 days -> 04/20 back to valtrex ppx dose     GVHD   • Tacrolimus  • Mini dose methotrexate given 10/26, 10/28, 10/31; cancelled 11/05 d/t  mucositis  • Olivia approved (no plan to start yet)  • Clobetasol 0.05% cream restarted 02/21  • Prednisone 30 mg 02/23 -> increase 40 mg 02/27 -> 03/13 dec to 35 mg -> 03/27 dec to 30 mg -> 04/03 dec to 25 mg -> 04/24 dec to 20 mg -> 05/08 dec to 17.5 mg (5 mg size sent) -> 05/15 dec to 15 mg -> 05/30 dec to 12.5 mg -> 06/20 10 mg -> 06/26 7.5 mg -> 07/10 5 mg --> 9/12/2023 5 mg every other day --> 10/4/2023 During clinic visit, Seth reported that he did not decrease to 5 mg every other day back on 9/12 and he's been taking 5 mg daily. Dr. Mari communicated he should reduce to every other day and message sent to Seth via [employer] re-emphasizing the dosing change  • Entocort EC 3 mg BID started 03/20 -> 05/08 red to 3 mg daily -> 07/10 d/c'd  • 07/31 Prednisolone mouth rinse Rx'd for dry mouth + encouraged to  Biotene OTC     Post transplant maintenance  • Polivy/rituximab started 02/20  • Polivy dose-reduced to 1.4 mg/kg due to peripheral neuropathy 04/03  • Polivy dose-reduced further to 1 mg/kg due to peripheral neuropathy 05/30  • Truxima/Polivy stopped 06/26/2023 due to peripheral neuropathy     If questions, contact oncology transplant pharmacist.     Thank you,    Akua Copeland, PharmD, [facility]  [phone] or secure chat    Statement Selected

## 2023-10-04 NOTE — PROGRESS NOTE ADULT - REASON FOR ADMISSION
10/04/23 0815   Team Meeting   Meeting Type Daily Rounds   Team Members Present   Team Members Present Physician;Nurse;   Physician Team Member [id number] Team Member SYLVIA AMOR [facility] Management Team Member Daksha   Patient/Family Present   Patient Present No   Patient's Family Present No     Pt visible, denied symptoms. Attend some groups. Compliant with meds and meals. Group home staff meeting with patient today at 1 PM. D/c Thursday pending labs. Virtual discharge coordination meeting tomorrow. Continue to monitor. etoh, abdominal pain

## 2023-10-15 NOTE — ED PROVIDER NOTE - OBJECTIVE STATEMENT
Pertinent PMH/PSH/FHx/SHx and Review of Systems contained within:   55 yo male with PMH EtOH, gastritis, HLD presents to ED for evalaution of abd pain with NB diarrhea x 1 days. Patient reports that pain is constant, generalized, nausea with NBNB vomiting. last drank pta. Denies fevers, chills, chest pain, shortness of breath, dysuria, rhinorrhea, rash, bleeding, numbness, ha, vision loss  he didn't take anything for symptoms.   Fh and Sh not otherwise contributory  ROS otherwise negative 37.1

## 2023-10-30 ENCOUNTER — EMERGENCY (EMERGENCY)
Facility: HOSPITAL | Age: 58
LOS: 0 days | Discharge: HOME | End: 2023-10-31
Attending: STUDENT IN AN ORGANIZED HEALTH CARE EDUCATION/TRAINING PROGRAM
Payer: MEDICAID

## 2023-10-30 VITALS
DIASTOLIC BLOOD PRESSURE: 89 MMHG | OXYGEN SATURATION: 98 % | WEIGHT: 160.06 LBS | SYSTOLIC BLOOD PRESSURE: 132 MMHG | HEART RATE: 81 BPM | RESPIRATION RATE: 22 BRPM | TEMPERATURE: 98 F | HEIGHT: 70 IN

## 2023-10-30 DIAGNOSIS — M79.602 PAIN IN LEFT ARM: ICD-10-CM

## 2023-10-30 DIAGNOSIS — M79.601 PAIN IN RIGHT ARM: ICD-10-CM

## 2023-10-30 DIAGNOSIS — M54.6 PAIN IN THORACIC SPINE: ICD-10-CM

## 2023-10-30 DIAGNOSIS — F10.229 ALCOHOL DEPENDENCE WITH INTOXICATION, UNSPECIFIED: ICD-10-CM

## 2023-10-30 DIAGNOSIS — M79.604 PAIN IN RIGHT LEG: ICD-10-CM

## 2023-10-30 DIAGNOSIS — E78.5 HYPERLIPIDEMIA, UNSPECIFIED: ICD-10-CM

## 2023-10-30 DIAGNOSIS — M79.605 PAIN IN LEFT LEG: ICD-10-CM

## 2023-10-30 DIAGNOSIS — Z87.19 PERSONAL HISTORY OF OTHER DISEASES OF THE DIGESTIVE SYSTEM: ICD-10-CM
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
## 2023-10-30 LAB
ALBUMIN SERPL ELPH-MCNC: 3.8 G/DL — SIGNIFICANT CHANGE UP (ref 3.3–5)
ALBUMIN SERPL ELPH-MCNC: 3.8 G/DL — SIGNIFICANT CHANGE UP (ref 3.3–5)
ALP SERPL-CCNC: 60 U/L — SIGNIFICANT CHANGE UP (ref 40–120)
ALP SERPL-CCNC: 60 U/L — SIGNIFICANT CHANGE UP (ref 40–120)
ALT FLD-CCNC: 35 U/L — SIGNIFICANT CHANGE UP (ref 12–78)
ALT FLD-CCNC: 35 U/L — SIGNIFICANT CHANGE UP (ref 12–78)
AMPHET UR-MCNC: NEGATIVE — SIGNIFICANT CHANGE UP
AMPHET UR-MCNC: NEGATIVE — SIGNIFICANT CHANGE UP
ANION GAP SERPL CALC-SCNC: 8 MMOL/L — SIGNIFICANT CHANGE UP (ref 5–17)
ANION GAP SERPL CALC-SCNC: 8 MMOL/L — SIGNIFICANT CHANGE UP (ref 5–17)
APAP SERPL-MCNC: < 2 UG/ML (ref 10–30)
APAP SERPL-MCNC: < 2 UG/ML (ref 10–30)
APPEARANCE UR: CLEAR — SIGNIFICANT CHANGE UP
APPEARANCE UR: CLEAR — SIGNIFICANT CHANGE UP
AST SERPL-CCNC: 41 U/L — HIGH (ref 15–37)
AST SERPL-CCNC: 41 U/L — HIGH (ref 15–37)
BACTERIA # UR AUTO: ABNORMAL
BACTERIA # UR AUTO: ABNORMAL
BARBITURATES UR SCN-MCNC: NEGATIVE — SIGNIFICANT CHANGE UP
BARBITURATES UR SCN-MCNC: NEGATIVE — SIGNIFICANT CHANGE UP
BASOPHILS # BLD AUTO: 0.04 K/UL — SIGNIFICANT CHANGE UP (ref 0–0.2)
BASOPHILS # BLD AUTO: 0.04 K/UL — SIGNIFICANT CHANGE UP (ref 0–0.2)
BASOPHILS NFR BLD AUTO: 1.1 % — SIGNIFICANT CHANGE UP (ref 0–2)
BASOPHILS NFR BLD AUTO: 1.1 % — SIGNIFICANT CHANGE UP (ref 0–2)
BENZODIAZ UR-MCNC: NEGATIVE — SIGNIFICANT CHANGE UP
BENZODIAZ UR-MCNC: NEGATIVE — SIGNIFICANT CHANGE UP
BILIRUB SERPL-MCNC: 0.5 MG/DL — SIGNIFICANT CHANGE UP (ref 0.2–1.2)
BILIRUB SERPL-MCNC: 0.5 MG/DL — SIGNIFICANT CHANGE UP (ref 0.2–1.2)
BILIRUB UR-MCNC: NEGATIVE — SIGNIFICANT CHANGE UP
BILIRUB UR-MCNC: NEGATIVE — SIGNIFICANT CHANGE UP
BUN SERPL-MCNC: 13 MG/DL — SIGNIFICANT CHANGE UP (ref 7–23)
BUN SERPL-MCNC: 13 MG/DL — SIGNIFICANT CHANGE UP (ref 7–23)
CALCIUM SERPL-MCNC: 8.7 MG/DL — SIGNIFICANT CHANGE UP (ref 8.5–10.1)
CALCIUM SERPL-MCNC: 8.7 MG/DL — SIGNIFICANT CHANGE UP (ref 8.5–10.1)
CHLORIDE SERPL-SCNC: 105 MMOL/L — SIGNIFICANT CHANGE UP (ref 96–108)
CHLORIDE SERPL-SCNC: 105 MMOL/L — SIGNIFICANT CHANGE UP (ref 96–108)
CK SERPL-CCNC: 265 U/L — SIGNIFICANT CHANGE UP (ref 26–308)
CK SERPL-CCNC: 265 U/L — SIGNIFICANT CHANGE UP (ref 26–308)
CO2 SERPL-SCNC: 28 MMOL/L — SIGNIFICANT CHANGE UP (ref 22–31)
CO2 SERPL-SCNC: 28 MMOL/L — SIGNIFICANT CHANGE UP (ref 22–31)
COCAINE METAB.OTHER UR-MCNC: NEGATIVE — SIGNIFICANT CHANGE UP
COCAINE METAB.OTHER UR-MCNC: NEGATIVE — SIGNIFICANT CHANGE UP
COLOR SPEC: YELLOW — SIGNIFICANT CHANGE UP
COLOR SPEC: YELLOW — SIGNIFICANT CHANGE UP
CREAT SERPL-MCNC: 1.19 MG/DL — SIGNIFICANT CHANGE UP (ref 0.5–1.3)
CREAT SERPL-MCNC: 1.19 MG/DL — SIGNIFICANT CHANGE UP (ref 0.5–1.3)
DIFF PNL FLD: NEGATIVE — SIGNIFICANT CHANGE UP
DIFF PNL FLD: NEGATIVE — SIGNIFICANT CHANGE UP
EGFR: 72 ML/MIN/1.73M2 — SIGNIFICANT CHANGE UP
EGFR: 72 ML/MIN/1.73M2 — SIGNIFICANT CHANGE UP
EOSINOPHIL # BLD AUTO: 0.19 K/UL — SIGNIFICANT CHANGE UP (ref 0–0.5)
EOSINOPHIL # BLD AUTO: 0.19 K/UL — SIGNIFICANT CHANGE UP (ref 0–0.5)
EOSINOPHIL NFR BLD AUTO: 5.1 % — SIGNIFICANT CHANGE UP (ref 0–6)
EOSINOPHIL NFR BLD AUTO: 5.1 % — SIGNIFICANT CHANGE UP (ref 0–6)
EPI CELLS # UR: SIGNIFICANT CHANGE UP
EPI CELLS # UR: SIGNIFICANT CHANGE UP
ETHANOL SERPL-MCNC: 271 MG/DL — HIGH (ref 0–10)
ETHANOL SERPL-MCNC: 271 MG/DL — HIGH (ref 0–10)
GLUCOSE SERPL-MCNC: 101 MG/DL — HIGH (ref 70–99)
GLUCOSE SERPL-MCNC: 101 MG/DL — HIGH (ref 70–99)
GLUCOSE UR QL: NEGATIVE MG/DL — SIGNIFICANT CHANGE UP
GLUCOSE UR QL: NEGATIVE MG/DL — SIGNIFICANT CHANGE UP
HCT VFR BLD CALC: 40.2 % — SIGNIFICANT CHANGE UP (ref 39–50)
HCT VFR BLD CALC: 40.2 % — SIGNIFICANT CHANGE UP (ref 39–50)
HGB BLD-MCNC: 12.6 G/DL — LOW (ref 13–17)
HGB BLD-MCNC: 12.6 G/DL — LOW (ref 13–17)
IMM GRANULOCYTES NFR BLD AUTO: 0.3 % — SIGNIFICANT CHANGE UP (ref 0–0.9)
IMM GRANULOCYTES NFR BLD AUTO: 0.3 % — SIGNIFICANT CHANGE UP (ref 0–0.9)
KETONES UR-MCNC: NEGATIVE — SIGNIFICANT CHANGE UP
KETONES UR-MCNC: NEGATIVE — SIGNIFICANT CHANGE UP
LEUKOCYTE ESTERASE UR-ACNC: NEGATIVE — SIGNIFICANT CHANGE UP
LEUKOCYTE ESTERASE UR-ACNC: NEGATIVE — SIGNIFICANT CHANGE UP
LIDOCAIN IGE QN: 75 U/L — SIGNIFICANT CHANGE UP (ref 13–75)
LIDOCAIN IGE QN: 75 U/L — SIGNIFICANT CHANGE UP (ref 13–75)
LYMPHOCYTES # BLD AUTO: 2.21 K/UL — SIGNIFICANT CHANGE UP (ref 1–3.3)
LYMPHOCYTES # BLD AUTO: 2.21 K/UL — SIGNIFICANT CHANGE UP (ref 1–3.3)
LYMPHOCYTES # BLD AUTO: 59.1 % — HIGH (ref 13–44)
LYMPHOCYTES # BLD AUTO: 59.1 % — HIGH (ref 13–44)
MCHC RBC-ENTMCNC: 23.2 PG — LOW (ref 27–34)
MCHC RBC-ENTMCNC: 23.2 PG — LOW (ref 27–34)
MCHC RBC-ENTMCNC: 31.3 G/DL — LOW (ref 32–36)
MCHC RBC-ENTMCNC: 31.3 G/DL — LOW (ref 32–36)
MCV RBC AUTO: 74 FL — LOW (ref 80–100)
MCV RBC AUTO: 74 FL — LOW (ref 80–100)
METHADONE UR-MCNC: NEGATIVE — SIGNIFICANT CHANGE UP
METHADONE UR-MCNC: NEGATIVE — SIGNIFICANT CHANGE UP
MONOCYTES # BLD AUTO: 0.28 K/UL — SIGNIFICANT CHANGE UP (ref 0–0.9)
MONOCYTES # BLD AUTO: 0.28 K/UL — SIGNIFICANT CHANGE UP (ref 0–0.9)
MONOCYTES NFR BLD AUTO: 7.5 % — SIGNIFICANT CHANGE UP (ref 2–14)
MONOCYTES NFR BLD AUTO: 7.5 % — SIGNIFICANT CHANGE UP (ref 2–14)
NEUTROPHILS # BLD AUTO: 1.01 K/UL — LOW (ref 1.8–7.4)
NEUTROPHILS # BLD AUTO: 1.01 K/UL — LOW (ref 1.8–7.4)
NEUTROPHILS NFR BLD AUTO: 26.9 % — LOW (ref 43–77)
NEUTROPHILS NFR BLD AUTO: 26.9 % — LOW (ref 43–77)
NITRITE UR-MCNC: NEGATIVE — SIGNIFICANT CHANGE UP
NITRITE UR-MCNC: NEGATIVE — SIGNIFICANT CHANGE UP
NRBC # BLD: 0 /100 WBCS — SIGNIFICANT CHANGE UP (ref 0–0)
NRBC # BLD: 0 /100 WBCS — SIGNIFICANT CHANGE UP (ref 0–0)
OPIATES UR-MCNC: NEGATIVE — SIGNIFICANT CHANGE UP
OPIATES UR-MCNC: NEGATIVE — SIGNIFICANT CHANGE UP
PCP SPEC-MCNC: SIGNIFICANT CHANGE UP
PCP SPEC-MCNC: SIGNIFICANT CHANGE UP
PCP UR-MCNC: NEGATIVE — SIGNIFICANT CHANGE UP
PCP UR-MCNC: NEGATIVE — SIGNIFICANT CHANGE UP
PH UR: 6.5 — SIGNIFICANT CHANGE UP (ref 5–8)
PH UR: 6.5 — SIGNIFICANT CHANGE UP (ref 5–8)
PLATELET # BLD AUTO: 305 K/UL — SIGNIFICANT CHANGE UP (ref 150–400)
PLATELET # BLD AUTO: 305 K/UL — SIGNIFICANT CHANGE UP (ref 150–400)
POTASSIUM SERPL-MCNC: 4.1 MMOL/L — SIGNIFICANT CHANGE UP (ref 3.5–5.3)
POTASSIUM SERPL-MCNC: 4.1 MMOL/L — SIGNIFICANT CHANGE UP (ref 3.5–5.3)
POTASSIUM SERPL-SCNC: 4.1 MMOL/L — SIGNIFICANT CHANGE UP (ref 3.5–5.3)
POTASSIUM SERPL-SCNC: 4.1 MMOL/L — SIGNIFICANT CHANGE UP (ref 3.5–5.3)
PROT SERPL-MCNC: 8.5 GM/DL — HIGH (ref 6–8.3)
PROT SERPL-MCNC: 8.5 GM/DL — HIGH (ref 6–8.3)
PROT UR-MCNC: 30 MG/DL
PROT UR-MCNC: 30 MG/DL
RBC # BLD: 5.43 M/UL — SIGNIFICANT CHANGE UP (ref 4.2–5.8)
RBC # BLD: 5.43 M/UL — SIGNIFICANT CHANGE UP (ref 4.2–5.8)
RBC # FLD: 19.9 % — HIGH (ref 10.3–14.5)
RBC # FLD: 19.9 % — HIGH (ref 10.3–14.5)
RBC CASTS # UR COMP ASSIST: SIGNIFICANT CHANGE UP /HPF (ref 0–4)
RBC CASTS # UR COMP ASSIST: SIGNIFICANT CHANGE UP /HPF (ref 0–4)
SALICYLATES SERPL-MCNC: <1.7 MG/DL — LOW (ref 2.8–20)
SALICYLATES SERPL-MCNC: <1.7 MG/DL — LOW (ref 2.8–20)
SODIUM SERPL-SCNC: 141 MMOL/L — SIGNIFICANT CHANGE UP (ref 135–145)
SODIUM SERPL-SCNC: 141 MMOL/L — SIGNIFICANT CHANGE UP (ref 135–145)
SP GR SPEC: 1.01 — SIGNIFICANT CHANGE UP (ref 1.01–1.02)
SP GR SPEC: 1.01 — SIGNIFICANT CHANGE UP (ref 1.01–1.02)
THC UR QL: NEGATIVE — SIGNIFICANT CHANGE UP
THC UR QL: NEGATIVE — SIGNIFICANT CHANGE UP
UROBILINOGEN FLD QL: NEGATIVE MG/DL — SIGNIFICANT CHANGE UP
UROBILINOGEN FLD QL: NEGATIVE MG/DL — SIGNIFICANT CHANGE UP
WBC # BLD: 3.74 K/UL — LOW (ref 3.8–10.5)
WBC # BLD: 3.74 K/UL — LOW (ref 3.8–10.5)
WBC # FLD AUTO: 3.74 K/UL — LOW (ref 3.8–10.5)
WBC # FLD AUTO: 3.74 K/UL — LOW (ref 3.8–10.5)
WBC UR QL: SIGNIFICANT CHANGE UP
WBC UR QL: SIGNIFICANT CHANGE UP

## 2023-10-30 PROCEDURE — 99285 EMERGENCY DEPT VISIT HI MDM: CPT

## 2023-10-30 PROCEDURE — 71045 X-RAY EXAM CHEST 1 VIEW: CPT | Mod: 26

## 2023-10-30 PROCEDURE — 93010 ELECTROCARDIOGRAM REPORT: CPT

## 2023-10-30 RX ORDER — ONDANSETRON 8 MG/1
4 TABLET, FILM COATED ORAL ONCE
Refills: 0 | Status: COMPLETED | OUTPATIENT
Start: 2023-10-30 | End: 2023-10-30

## 2023-10-30 RX ORDER — KETOROLAC TROMETHAMINE 30 MG/ML
15 SYRINGE (ML) INJECTION ONCE
Refills: 0 | Status: DISCONTINUED | OUTPATIENT
Start: 2023-10-30 | End: 2023-10-30

## 2023-10-30 RX ORDER — ACETAMINOPHEN 500 MG
1000 TABLET ORAL ONCE
Refills: 0 | Status: COMPLETED | OUTPATIENT
Start: 2023-10-30 | End: 2023-10-30

## 2023-10-30 RX ORDER — FAMOTIDINE 10 MG/ML
1 INJECTION INTRAVENOUS
Qty: 28 | Refills: 0
Start: 2023-10-30 | End: 2023-11-12

## 2023-10-30 RX ORDER — SODIUM CHLORIDE 9 MG/ML
1000 INJECTION INTRAMUSCULAR; INTRAVENOUS; SUBCUTANEOUS ONCE
Refills: 0 | Status: COMPLETED | OUTPATIENT
Start: 2023-10-30 | End: 2023-10-30

## 2023-10-30 RX ORDER — FAMOTIDINE 10 MG/ML
20 INJECTION INTRAVENOUS ONCE
Refills: 0 | Status: COMPLETED | OUTPATIENT
Start: 2023-10-30 | End: 2023-10-30

## 2023-10-30 RX ADMIN — SODIUM CHLORIDE 1000 MILLILITER(S): 9 INJECTION INTRAMUSCULAR; INTRAVENOUS; SUBCUTANEOUS at 21:40

## 2023-10-30 RX ADMIN — FAMOTIDINE 20 MILLIGRAM(S): 10 INJECTION INTRAVENOUS at 20:12

## 2023-10-30 RX ADMIN — Medication 0.5 MILLIGRAM(S): at 22:54

## 2023-10-30 RX ADMIN — Medication 15 MILLIGRAM(S): at 20:12

## 2023-10-30 RX ADMIN — ONDANSETRON 4 MILLIGRAM(S): 8 TABLET, FILM COATED ORAL at 22:54

## 2023-10-30 RX ADMIN — Medication 1 MILLIGRAM(S): at 18:23

## 2023-10-30 RX ADMIN — Medication 400 MILLIGRAM(S): at 22:54

## 2023-10-30 NOTE — ED PROVIDER NOTE - PROGRESS NOTE DETAILS
Black DO: pt clinically sober, states has some anxiety so will give small dose of ativan - no nausea, vomiting, tachycardia, distal extremity tremors or tongue fasciculations, CIWA < 2. Pt denies SI - states he does not want to die or harm him self but states that full body pain makes him upset and that he hates coming to the hospital. Pt etoh level should be < 100 at this time based on usual metabolism for males. I spoke w/ pts son who states pt has not endorsed SI, just drinks chronically. He sees no safety issues for discharge and will come drive patient home. Pt given return precautions, and on serial interviews denies SI Black DO: pt clinically sober, states has some anxiety so will give small dose of ativan - no nausea, vomiting, tachycardia, distal extremity tremors or tongue fasciculations, CIWA < 2. Pt denies SI - states he does not want to die or harm him self but states that full body pain makes him upset so that he will say statements like 'give me injection' and 'pain is so bad I want to die'. States that he only wanted pain medicine and wants to live for his family. He states he hates coming to the hospital but today the pain pushed him to come in. Pt etoh level should be < 100 at this time based on usual metabolism for males. I spoke w/ pts son who states pt has not endorsed SI, just drinks chronically. Pts son sees no safety issues for discharge and will come drive patient home. Pt given return precautions, and on serial interviews denies SI

## 2023-10-30 NOTE — ED PROVIDER NOTE - PHYSICAL EXAMINATION
Gen: AOX3, NAD  Head: NCAT  ENT: Airway patent, dry mucous membranes, nasal passageways clear,    Cardiac: Normal rate, normal rhythm, no murmurs   Respiratory: Lungs CTA B/L  Gastrointestinal: Abdomen soft, nontender, nondistended, no rebound, no guarding  MSK: No gross abnormalities, FROM of all four extremities, no edema  HEME: Extremities warm, pulses intact and symmetrical in all four extremities  Skin: No rashes, no lesions  Neuro: No gross neurologic deficits,   strength equal in all four extremities, no gait abnormality, no extremity tremors, no tongue fasciculations

## 2023-10-30 NOTE — ED ADULT NURSE NOTE - NS ED NURSE DC INFO COMPLEXITY
Simple: Patient demonstrates quick and easy understanding/Verbalized Understanding Gabapentin Counseling: I discussed with the patient the risks of gabapentin including but not limited to dizziness, somnolence, fatigue and ataxia.

## 2023-10-30 NOTE — ED ADULT NURSE NOTE - OBJECTIVE STATEMENT
As per EMS pt is known to the ER, he denies drinking today states drank 4 days ago but has "pain all over" and feels "suicidal and wants to die". Will not answer other questions at this time, swelling noted to hands states this happens because "his body doesn't work properly".

## 2023-10-30 NOTE — ED ADULT TRIAGE NOTE - CHIEF COMPLAINT QUOTE
as per EMS " 4 days was his last drink, c/o of body pain, swollen hands and tremors." [ Hx of Alcohol dependence, gastritis]

## 2023-10-30 NOTE — ED PROVIDER NOTE - OBJECTIVE STATEMENT
58M pmhx etoh abuse and dependence, frequent ED visits per chart review with complaint of diffuse myalgias and SI. Patient today presents with diffuse myalgias, states 'whole body hurts, I want to die, just please give me an injection so I can die'. Patient denies fevers, vomiting, diarrhea, cough, chest pain or shortness of breath. States he has not consumed alcohol in 4 days due to body pain. Per EMS, pts wife called ambulance from home. 58M pmhx etoh abuse and dependence, frequent ED visits per chart review with complaint of diffuse myalgias. Patient today presents with diffuse myalgias, states 'whole body hurts, I want to die, just please give me an injection so I can die so that I can feel better and body can stop hurting'. Patient denies fevers, vomiting, diarrhea, cough, chest pain or shortness of breath. States he has not consumed alcohol in 4 days due to body pain. Per EMS, pts wife called ambulance from home. Denies chest pain, sob , abd pain or vomiting. Notes mild nausea. Notes pain in arms/ legs/ upper back.

## 2023-10-30 NOTE — ED ADULT NURSE NOTE - NSFALLRISKINTERV_ED_ALL_ED
Communicate fall risk and risk factors to all staff, patient, and family/Provide visual cue: yellow wristband, yellow gown, etc/Reinforce activity limits and safety measures with patient and family/Use of alarms - bed, stretcher, chair and/or video monitoring/Call bell, personal items and telephone in reach/Instruct patient to call for assistance before getting out of bed/chair/stretcher/Non-slip footwear applied when patient is off stretcher/[place] to call system/Physically safe environment - no spills, clutter or unnecessary equipment/Purposeful Proactive Rounding/Room/bathroom lighting operational, light cord in reach

## 2023-10-30 NOTE — ED PROVIDER NOTE - CLINICAL SUMMARY MEDICAL DECISION MAKING FREE TEXT BOX
58M pmhx etoh abuse and dependence, frequent ED visits per chart review with complaint of diffuse myalgias. Pt with mild intoxication on initial arrival. Pt endorsed 'wanting to die' due to pain so was placed on constant obs for concern for SI. Analgesia provided - toradol, fluids, ativan as initially pt stated had not consumed alcohol in 4 days so concern for withdrawal. checked cbc, cmp, lipase to eval for metabolic disturbances or leukocytosis or pancreatitis, checked ck rule out rhabdo. Pt felt much better after initial meds, stated he was not suicidal on multiple reassessments, stated he only wanted pain medication. D/w pts family who agreed no endorsement of SI. Pts alcohol level was elevated so monitored for pt for 6 hours until alcohol level lower and pt clearly clinically sober, still not endorsing suicidality. Pt felt better and outpt follow up and detox resources provided, pt encouraged to seek detox

## 2023-10-30 NOTE — ED PROVIDER NOTE - PATIENT PORTAL LINK FT
You can access the FollowMyHealth Patient Portal offered by [facility] by registering at the following website: [email or web address]. By joining [employer]’s FollowMyHealth portal, you will also be able to view your health information using other applications (apps) compatible with our system.

## 2023-10-30 NOTE — ED PROVIDER NOTE - NSFOLLOWUPCLINICS_GEN_ALL_ED_FT
[facility]  Behavioral Health  [address] [place] [address]  Phone: [phone]  Fax:

## 2023-10-30 NOTE — ED PROVIDER NOTE - NSFOLLOWUPINSTRUCTIONS_ED_ALL_ED_FT
Detail Level: Simple Detail Level: Generalized You were seen today for alcohol use and diffuse body pains    You were given a dose of toradol and IV tylenol with improvement of symptoms    For continued symptoms, take pepcid 20mg every 12 hours as needed for gastritis/ stomach upset    Take naproxen 250mg once a day with food and plenty of water for up to 5 days (can increase risk of gastritis/ stomach upset)   Take tylenol 650 mg every 6 hours as needed for pain   Follow up with your primary doctor in 1-2 days  Return for any worsening pain, thoughts of self harm, or worsening symptoms         Alcohol Use Disorder    WHAT YOU NEED TO KNOW:    Alcohol use disorder (AUD) is problem drinking. AUD includes alcohol abuse and alcohol dependency.     DISCHARGE INSTRUCTIONS:    Seek care immediately if:     Your heart is beating faster than usual.      You have hallucinations.      You cannot remember what happens while you are drinking.      You have seizures.    Contact your healthcare provider if:     You are anxious and have nausea.      Your hands are shaky and you are sweating heavily.      You have questions or concerns about your condition or care.    Follow up with your healthcare provider as directed: Do not try to stop drinking on your own. Your healthcare provider may need to help you withdraw from alcohol safely. He may need to admit you to the hospital. You may also need any of the following treatments:    Medicines to decrease your craving for alcohol      Support groups such as Alcoholics Anonymous       Therapy from a psychiatrist or psychologist       Admission to an inpatient facility for treatment for severe AUD    Interested in discussing options to reduce your alcohol or drug use?      [facility]: [phone]   [facility] Substance Abuse Services: [phone], option #2   Methadone Maintenance & Ambulatory Opiate Detox: [phone]  [facility]: [phone]  [facility]: [phone]  [facility]: [phone]    [facility]: [phone], option #2   [facility]: [phone]    [facility]: [phone]    [facility] Central Intake: [phone]  [facility] Chemical Dependency/Ancillary Withdrawal: [phone]  [facility] Methadone Maintenance: [phone]    [facility]: [phone]  [facility] Addiction Treatment Services: [phone]    [facility]: 9-660-4-HOPENY    [facility] Office of Alcoholism and Substance Abuse Services (OASAS): https://www.oasas.[place].gov/providerdirectory/  [facility] for Addiction Services and Psychotherapy Interventions Research (CASPIR)  www.[facility].org     Interested in discussing options to reduce your tobacco use?    [facility] for Tobacco Control:  [phone]  [facility] QUITLINE: 9-375-RC-QUITS ([phone])    Interested in learning more about substance use?      http://rethinkingdrinking.niaaa.nih.gov   https://www.drugabuse.gov/patients-families     Learn more about opioid overdose prevention programs in [place]:  http://www.health.[place].gov/diseases/aids/general/opioid_overdose_prevention/

## 2023-10-31 VITALS
DIASTOLIC BLOOD PRESSURE: 65 MMHG | OXYGEN SATURATION: 98 % | TEMPERATURE: 98 F | RESPIRATION RATE: 16 BRPM | SYSTOLIC BLOOD PRESSURE: 125 MMHG | HEART RATE: 78 BPM

## 2023-11-01 NOTE — ED ADULT NURSE NOTE - PRO INTERPRETER NEED 2
Medicaid form was signed by Dr. Atkins and faxed back to number requested [phone], Medicaid consent form was sent back to medical records to be uploaded into pt's chart under her media tab.   English

## 2023-11-05 ENCOUNTER — EMERGENCY (EMERGENCY)
Facility: HOSPITAL | Age: 58
LOS: 0 days | Discharge: HOME | End: 2023-11-05
Attending: STUDENT IN AN ORGANIZED HEALTH CARE EDUCATION/TRAINING PROGRAM
Payer: MEDICAID

## 2023-11-05 VITALS
SYSTOLIC BLOOD PRESSURE: 130 MMHG | DIASTOLIC BLOOD PRESSURE: 90 MMHG | RESPIRATION RATE: 16 BRPM | OXYGEN SATURATION: 98 % | HEART RATE: 86 BPM

## 2023-11-05 VITALS
HEART RATE: 92 BPM | DIASTOLIC BLOOD PRESSURE: 103 MMHG | TEMPERATURE: 98 F | WEIGHT: 181 LBS | HEIGHT: 67 IN | SYSTOLIC BLOOD PRESSURE: 144 MMHG | OXYGEN SATURATION: 98 % | RESPIRATION RATE: 18 BRPM

## 2023-11-05 DIAGNOSIS — K06.8 OTHER SPECIFIED DISORDERS OF GINGIVA AND EDENTULOUS ALVEOLAR RIDGE: ICD-10-CM

## 2023-11-05 DIAGNOSIS — K08.89 OTHER SPECIFIED DISORDERS OF TEETH AND SUPPORTING STRUCTURES: ICD-10-CM

## 2023-11-05 PROCEDURE — 99284 EMERGENCY DEPT VISIT MOD MDM: CPT | Mod: 25

## 2023-11-05 RX ORDER — KETOROLAC TROMETHAMINE 30 MG/ML
30 SYRINGE (ML) INJECTION ONCE
Refills: 0 | Status: DISCONTINUED | OUTPATIENT
Start: 2023-11-05 | End: 2023-11-05

## 2023-11-05 RX ORDER — AMOXICILLIN 250 MG/5ML
1 SUSPENSION, RECONSTITUTED, ORAL (ML) ORAL
Qty: 14 | Refills: 0
Start: 2023-11-05 | End: 2023-11-11

## 2023-11-05 RX ORDER — AMOXICILLIN 250 MG/5ML
500 SUSPENSION, RECONSTITUTED, ORAL (ML) ORAL ONCE
Refills: 0 | Status: COMPLETED | OUTPATIENT
Start: 2023-11-05 | End: 2023-11-05

## 2023-11-05 RX ORDER — CHLORHEXIDINE GLUCONATE 213 G/1000ML
15 SOLUTION TOPICAL
Qty: 1 | Refills: 0
Start: 2023-11-05 | End: 2023-11-11

## 2023-11-05 RX ADMIN — Medication 500 MILLIGRAM(S): at 02:42

## 2023-11-05 RX ADMIN — Medication 30 MILLIGRAM(S): at 02:42

## 2023-11-05 NOTE — ED ADULT TRIAGE NOTE - CHIEF COMPLAINT QUOTE
Patient reports "Teeth pain. The cold makes it worse." patient with strong odor of alcohol to breath: states "last drink 5 days ago." 10/10 pain. FS 74. patient reports "I can't sleep because of the pain. Pull the teeth out."

## 2023-11-05 NOTE — ED PROVIDER NOTE - PROGRESS NOTE DETAILS
Results reported to patient--grossly benign, labs unremarkable   Pt. reports feeling better after meds  pt. agrees to f/u with primary care outpt., dental referral given   pt. understands to return to ED if symptoms worsen; will d/c with otc tylenol/motrin and penicillin to cover dental infection

## 2023-11-05 NOTE — ED PROVIDER NOTE - PHYSICAL EXAMINATION
General: well appearing, ambulating around ED  HEENT: NC/AT, MMM, PERRL, poor dentition, mild L lower gum swelling/erythema. No edema under tongue. NO lesions. no cervical adenopathy  Resp: no respiratory distress, tachypnea, or accessory muscle usage  Neuro: axoxo3, speaking full sentences, ambulatory with steady gait, clinically sober

## 2023-11-05 NOTE — ED ADULT TRIAGE NOTE - PAIN: PRESENCE, MLM
If you have any questions regarding your visit, Please contact your care team.     [facility]: [phone]    [facility] CLINIC HOURS TELEPHONE NUMBER   DON [place]-    Radha Gusman-MARIAN Wills-Medical Assistant   Monday-[place]  8:00a.m-4:45 p.m  Wednesday-[place] 8:00a.m-4:45 p.m.  Thursday-[place]  8:00a.m-4:45 p.m.  Friday-[place]  8:00a.m-4:45 p.m. [facility]  [address] [place] [address]  [phone] ask [facility]  [phone] Fax  Imaging [phone]    [facility] Labor and Delivery  [address] [place] [address] [place] [address]  [phone]    [facility]  [address] [place] [address]  [phone] [facility]  [phone] Fax  Imaging [phone]     Urgent Care locations:    [place]        [place] Monday-Friday  5 pm - 9 pm  Saturday and Sunday   9 am - 5 pm    Monday-Friday   11 am - 9 pm  Saturday and Sunday   9 am - 5 pm   [phone]       If you need a medication refill, please contact your pharmacy. Please allow 3 business days for your refill to be completed.  As always, Thank you for trusting us with your healthcare needs!    
complains of pain/discomfort

## 2023-11-05 NOTE — ED PROVIDER NOTE - NSFOLLOWUPINSTRUCTIONS_ED_ALL_ED_FT
You were evaluated in the ER for dental pain. Your exam shows gum swelling and an antibiotic and pain medication were administered in the ER. Use special mouthwash prescribed to aid with symptoms and call number provided for a dental appointment.  You may also visit [facility] dental clinic. Monday-Thursday 8:30 am-4:30 pm; Friday 8 am-4 pm. Phone number: [phone]

## 2023-11-05 NOTE — ED ADULT NURSE NOTE - OBJECTIVE STATEMENT
AAOx3 pt presents to ED c/o tooth pain on left side lower row of teeth for past "couple days". Pt denies trauma, and injury. Pt expresses unable to sleep due to pain. Pt denies drinking and drug use. Known history of alcohol abuse. Pt speech is clear, demeanor is expressive of pain, ambulatory with steady gait, cooperative with staff. Denies PMH

## 2023-11-05 NOTE — ED ADULT TRIAGE NOTE - WEIGHT METHOD
Requested Prescriptions   Pending Prescriptions Disp Refills     budesonide (PULMICORT) 0.5 MG/2ML neb solution       Sig: Mix respule of 0.5mg/2 mL budesonide vial in 8oz normal saline sinus rinse bottle. Irrigate each nostril with half of the bottle twice daily       There is no refill protocol information for this order        Last office visit: 9/18/2020 with prescribing provider:  Dr. Leiva   Future Office Visit:   Next 5 appointments (look out 90 days)    Oct 14, 2021 11:30 AM  Return Visit with Jose Angel Leiva MD  [facility] ([facility] ) [address] [facility] [address] [phone]               [facility]  Specialty Clinic CSS       stated

## 2023-11-05 NOTE — ED PROVIDER NOTE - CLINICAL SUMMARY MEDICAL DECISION MAKING FREE TEXT BOX
Pt here with dental pain. no obvious abscess. Poor dentition and gingivitis seen. Will give dental referral. Given amox and toradol and requesting dc home. Pt has hx etoh use. Currently clinically sober, axoxo3, ambulatory with steady gait.

## 2023-11-05 NOTE — ED PROVIDER NOTE - NSFOLLOWUPCLINICS_GEN_ALL_ED_FT
Oral & Maxillofacial Surgery  Department of Dental Medicine  [address] [place] [address]  Phone: [phone]  Fax: [phone]  Follow Up Time: 1-3 Days

## 2023-11-05 NOTE — ED PROVIDER NOTE - OBJECTIVE STATEMENT
59 yo M pmh etoh abuse, here with 3 days dental pain. Pt requesting pain medication injections. Pt aggressive with staff demanding 57 yo M pmh etoh abuse, here with 3 days dental pain. Pt requesting pain medication injections. Pt aggressive with staff demanding pain medications but is verbally redirectable. Afebrile. No difficulty tolerating po or tolerating secretions.

## 2023-11-05 NOTE — ED PROVIDER NOTE - PATIENT PORTAL LINK FT
You can access the FollowMyHealth Patient Portal offered by [facility] by registering at the following website: [email or web address]. By joining [employer]’s FollowMyHealth portal, you will also be able to view your health information using other applications (apps) compatible with our system.

## 2023-11-06 NOTE — PATIENT PROFILE ADULT - BRADEN SCORE
Vaccine Information Statement(s) or the Emergency Use Authorization was given today. This has been reviewed, questions answered, and verbal consent given by Parent for injection(s) and administration of Meningococcal  and Tetanus/Diphtheria/Pertussis (Tdap).      Patient tolerated without incident. See immunization grid for documentation.     13

## 2023-11-07 NOTE — ED ADULT NURSE NOTE - CHPI ED NUR SEVERITY2
- non compliant with medication   - expressing homicidal thoughts towards family members but has not been physically aggressive towards anyone   - previous IPP admissions PAIN SCALE 8 OF 10. English

## 2023-11-08 ENCOUNTER — EMERGENCY (EMERGENCY)
Facility: HOSPITAL | Age: 58
LOS: 1 days | Discharge: HOME | End: 2023-11-08
Attending: STUDENT IN AN ORGANIZED HEALTH CARE EDUCATION/TRAINING PROGRAM
Payer: MEDICAID

## 2023-11-08 VITALS
HEIGHT: 67 IN | RESPIRATION RATE: 19 BRPM | WEIGHT: 175.05 LBS | TEMPERATURE: 98 F | HEART RATE: 97 BPM | DIASTOLIC BLOOD PRESSURE: 107 MMHG | OXYGEN SATURATION: 96 % | SYSTOLIC BLOOD PRESSURE: 150 MMHG

## 2023-11-08 DIAGNOSIS — M79.10 MYALGIA, UNSPECIFIED SITE: ICD-10-CM

## 2023-11-08 DIAGNOSIS — R10.84 GENERALIZED ABDOMINAL PAIN: ICD-10-CM

## 2023-11-08 DIAGNOSIS — K08.89 OTHER SPECIFIED DISORDERS OF TEETH AND SUPPORTING STRUCTURES: ICD-10-CM

## 2023-11-08 DIAGNOSIS — R11.2 NAUSEA WITH VOMITING, UNSPECIFIED: ICD-10-CM

## 2023-11-08 DIAGNOSIS — G89.29 OTHER CHRONIC PAIN: ICD-10-CM

## 2023-11-08 DIAGNOSIS — F10.229 ALCOHOL DEPENDENCE WITH INTOXICATION, UNSPECIFIED: ICD-10-CM

## 2023-11-08 LAB
ALBUMIN SERPL ELPH-MCNC: 4.1 G/DL — SIGNIFICANT CHANGE UP (ref 3.3–5)
ALBUMIN SERPL ELPH-MCNC: 4.1 G/DL — SIGNIFICANT CHANGE UP (ref 3.3–5)
ALP SERPL-CCNC: 58 U/L — SIGNIFICANT CHANGE UP (ref 40–120)
ALP SERPL-CCNC: 58 U/L — SIGNIFICANT CHANGE UP (ref 40–120)
ALT FLD-CCNC: 35 U/L — SIGNIFICANT CHANGE UP (ref 12–78)
ALT FLD-CCNC: 35 U/L — SIGNIFICANT CHANGE UP (ref 12–78)
ANION GAP SERPL CALC-SCNC: 10 MMOL/L — SIGNIFICANT CHANGE UP (ref 5–17)
ANION GAP SERPL CALC-SCNC: 10 MMOL/L — SIGNIFICANT CHANGE UP (ref 5–17)
AST SERPL-CCNC: 41 U/L — HIGH (ref 15–37)
AST SERPL-CCNC: 41 U/L — HIGH (ref 15–37)
BILIRUB SERPL-MCNC: 0.5 MG/DL — SIGNIFICANT CHANGE UP (ref 0.2–1.2)
BILIRUB SERPL-MCNC: 0.5 MG/DL — SIGNIFICANT CHANGE UP (ref 0.2–1.2)
BUN SERPL-MCNC: 14 MG/DL — SIGNIFICANT CHANGE UP (ref 7–23)
BUN SERPL-MCNC: 14 MG/DL — SIGNIFICANT CHANGE UP (ref 7–23)
CALCIUM SERPL-MCNC: 8.9 MG/DL — SIGNIFICANT CHANGE UP (ref 8.5–10.1)
CALCIUM SERPL-MCNC: 8.9 MG/DL — SIGNIFICANT CHANGE UP (ref 8.5–10.1)
CHLORIDE SERPL-SCNC: 105 MMOL/L — SIGNIFICANT CHANGE UP (ref 96–108)
CHLORIDE SERPL-SCNC: 105 MMOL/L — SIGNIFICANT CHANGE UP (ref 96–108)
CO2 SERPL-SCNC: 27 MMOL/L — SIGNIFICANT CHANGE UP (ref 22–31)
CO2 SERPL-SCNC: 27 MMOL/L — SIGNIFICANT CHANGE UP (ref 22–31)
CREAT SERPL-MCNC: 1.14 MG/DL — SIGNIFICANT CHANGE UP (ref 0.5–1.3)
CREAT SERPL-MCNC: 1.14 MG/DL — SIGNIFICANT CHANGE UP (ref 0.5–1.3)
EGFR: 75 ML/MIN/1.73M2 — SIGNIFICANT CHANGE UP
EGFR: 75 ML/MIN/1.73M2 — SIGNIFICANT CHANGE UP
ETHANOL SERPL-MCNC: 296 MG/DL — HIGH (ref 0–10)
ETHANOL SERPL-MCNC: 296 MG/DL — HIGH (ref 0–10)
GLUCOSE SERPL-MCNC: 104 MG/DL — HIGH (ref 70–99)
GLUCOSE SERPL-MCNC: 104 MG/DL — HIGH (ref 70–99)
HCT VFR BLD CALC: 40.5 % — SIGNIFICANT CHANGE UP (ref 39–50)
HCT VFR BLD CALC: 40.5 % — SIGNIFICANT CHANGE UP (ref 39–50)
HGB BLD-MCNC: 12.8 G/DL — LOW (ref 13–17)
HGB BLD-MCNC: 12.8 G/DL — LOW (ref 13–17)
LIDOCAIN IGE QN: 66 U/L — SIGNIFICANT CHANGE UP (ref 13–75)
LIDOCAIN IGE QN: 66 U/L — SIGNIFICANT CHANGE UP (ref 13–75)
MAGNESIUM SERPL-MCNC: 2.1 MG/DL — SIGNIFICANT CHANGE UP (ref 1.6–2.6)
MAGNESIUM SERPL-MCNC: 2.1 MG/DL — SIGNIFICANT CHANGE UP (ref 1.6–2.6)
MCHC RBC-ENTMCNC: 23.5 PG — LOW (ref 27–34)
MCHC RBC-ENTMCNC: 23.5 PG — LOW (ref 27–34)
MCHC RBC-ENTMCNC: 31.6 G/DL — LOW (ref 32–36)
MCHC RBC-ENTMCNC: 31.6 G/DL — LOW (ref 32–36)
MCV RBC AUTO: 74.4 FL — LOW (ref 80–100)
MCV RBC AUTO: 74.4 FL — LOW (ref 80–100)
NRBC # BLD: 0 /100 WBCS — SIGNIFICANT CHANGE UP (ref 0–0)
NRBC # BLD: 0 /100 WBCS — SIGNIFICANT CHANGE UP (ref 0–0)
PLATELET # BLD AUTO: 264 K/UL — SIGNIFICANT CHANGE UP (ref 150–400)
PLATELET # BLD AUTO: 264 K/UL — SIGNIFICANT CHANGE UP (ref 150–400)
POTASSIUM SERPL-MCNC: 3.7 MMOL/L — SIGNIFICANT CHANGE UP (ref 3.5–5.3)
POTASSIUM SERPL-MCNC: 3.7 MMOL/L — SIGNIFICANT CHANGE UP (ref 3.5–5.3)
POTASSIUM SERPL-SCNC: 3.7 MMOL/L — SIGNIFICANT CHANGE UP (ref 3.5–5.3)
POTASSIUM SERPL-SCNC: 3.7 MMOL/L — SIGNIFICANT CHANGE UP (ref 3.5–5.3)
PROT SERPL-MCNC: 8.5 GM/DL — HIGH (ref 6–8.3)
PROT SERPL-MCNC: 8.5 GM/DL — HIGH (ref 6–8.3)
RBC # BLD: 5.44 M/UL — SIGNIFICANT CHANGE UP (ref 4.2–5.8)
RBC # BLD: 5.44 M/UL — SIGNIFICANT CHANGE UP (ref 4.2–5.8)
RBC # FLD: 21.5 % — HIGH (ref 10.3–14.5)
RBC # FLD: 21.5 % — HIGH (ref 10.3–14.5)
SODIUM SERPL-SCNC: 142 MMOL/L — SIGNIFICANT CHANGE UP (ref 135–145)
SODIUM SERPL-SCNC: 142 MMOL/L — SIGNIFICANT CHANGE UP (ref 135–145)
WBC # BLD: 3.75 K/UL — LOW (ref 3.8–10.5)
WBC # BLD: 3.75 K/UL — LOW (ref 3.8–10.5)
WBC # FLD AUTO: 3.75 K/UL — LOW (ref 3.8–10.5)
WBC # FLD AUTO: 3.75 K/UL — LOW (ref 3.8–10.5)

## 2023-11-08 PROCEDURE — 99284 EMERGENCY DEPT VISIT MOD MDM: CPT

## 2023-11-08 RX ORDER — ACETAMINOPHEN 500 MG
650 TABLET ORAL ONCE
Refills: 0 | Status: COMPLETED | OUTPATIENT
Start: 2023-11-08 | End: 2023-11-08

## 2023-11-08 RX ORDER — FAMOTIDINE 10 MG/ML
20 INJECTION INTRAVENOUS ONCE
Refills: 0 | Status: COMPLETED | OUTPATIENT
Start: 2023-11-08 | End: 2023-11-08

## 2023-11-08 RX ORDER — KETOROLAC TROMETHAMINE 30 MG/ML
15 SYRINGE (ML) INJECTION ONCE
Refills: 0 | Status: DISCONTINUED | OUTPATIENT
Start: 2023-11-08 | End: 2023-11-08

## 2023-11-08 RX ORDER — SODIUM CHLORIDE 9 MG/ML
1000 INJECTION INTRAMUSCULAR; INTRAVENOUS; SUBCUTANEOUS ONCE
Refills: 0 | Status: COMPLETED | OUTPATIENT
Start: 2023-11-08 | End: 2023-11-08

## 2023-11-08 RX ORDER — ONDANSETRON 8 MG/1
4 TABLET, FILM COATED ORAL ONCE
Refills: 0 | Status: COMPLETED | OUTPATIENT
Start: 2023-11-08 | End: 2023-11-08

## 2023-11-08 RX ORDER — ACETAMINOPHEN 500 MG
1000 TABLET ORAL ONCE
Refills: 0 | Status: COMPLETED | OUTPATIENT
Start: 2023-11-08 | End: 2023-11-08

## 2023-11-08 RX ADMIN — FAMOTIDINE 20 MILLIGRAM(S): 10 INJECTION INTRAVENOUS at 19:55

## 2023-11-08 RX ADMIN — Medication 15 MILLIGRAM(S): at 21:42

## 2023-11-08 RX ADMIN — Medication 2 MILLIGRAM(S): at 20:41

## 2023-11-08 RX ADMIN — SODIUM CHLORIDE 1000 MILLILITER(S): 9 INJECTION INTRAMUSCULAR; INTRAVENOUS; SUBCUTANEOUS at 20:58

## 2023-11-08 RX ADMIN — SODIUM CHLORIDE 1000 MILLILITER(S): 9 INJECTION INTRAMUSCULAR; INTRAVENOUS; SUBCUTANEOUS at 19:56

## 2023-11-08 RX ADMIN — Medication 400 MILLIGRAM(S): at 20:31

## 2023-11-08 RX ADMIN — ONDANSETRON 4 MILLIGRAM(S): 8 TABLET, FILM COATED ORAL at 19:56

## 2023-11-08 RX ADMIN — Medication 1000 MILLIGRAM(S): at 21:31

## 2023-11-08 NOTE — ED ADULT NURSE REASSESSMENT NOTE - NS ED NURSE REASSESS COMMENT FT1
Report received from PRETT[facility] [place] Safety checks completed this shift. Safety rounds completed hourly.  IV sites checked +remains WDL.  Fall & skin precautions in place. No acute distress noted. respirations even and unlabored. pt resting comfortably in stretcher, rise and fall of chest noted. no

## 2023-11-08 NOTE — ED ADULT NURSE NOTE - NSFALLUNIVINTERV_ED_ALL_ED
Bed/Stretcher in lowest position, wheels locked, appropriate side rails in place/Call bell, personal items and telephone in reach/Instruct patient to call for assistance before getting out of bed/chair/stretcher/Non-slip footwear applied when patient is off stretcher/[place] to call system/Physically safe environment - no spills, clutter or unnecessary equipment/Purposeful proactive rounding/Room/bathroom lighting operational, light cord in reach

## 2023-11-08 NOTE — ED PROVIDER NOTE - NS ED ROS FT
CONSTITUTIONAL: No fever, no chills, no fatigue  EYES: No visual changes  ENT: No ear pain, no sore throat  CARDIOVASCULAR: No chest pain, no palpitations  RESPIRATORY: No cough, no SOB  GI:  no constipation, no diarrhea  GENITOURINARY: No dysuria, no frequency, no hematuria  MUSKULOSKELETAL: No back pain, no joint pain.   SKIN: No rash  NEURO: No headache    ALL OTHER SYSTEMS NEGATIVE.

## 2023-11-08 NOTE — ED ADULT NURSE NOTE - NSICDXPASTMEDICALHX_GEN_ALL_CORE_FT
PAST MEDICAL HISTORY:  DTs (delirium tremens)     EtOH dependence     Gastritis     HTN (hypertension)     Mixed hyperlipidemia     PUD (peptic ulcer disease)     Substance abuse

## 2023-11-08 NOTE — ED PROVIDER NOTE - ATTENDING APP SHARED VISIT CONTRIBUTION OF CARE
alcohol intoxication and abdominal pain  labs wnl  symptoms improved with benzos  reassess, likely discharge when clinically sober

## 2023-11-08 NOTE — ED PROVIDER NOTE - PATIENT PORTAL LINK FT
You can access the FollowMyHealth Patient Portal offered by [facility] by registering at the following website: [email or web address]. By joining [employer]’s FollowMyHealth portal, you will also be able to view your health information using other applications (apps) compatible with our system.

## 2023-11-08 NOTE — ED PROVIDER NOTE - NSFOLLOWUPCLINICS_GEN_ALL_ED_FT
Oral & Maxillofacial Surgery  Department of Dental Medicine  [address] [place] [address]  Phone: [phone]  Fax: [phone]  Follow Up Time: Urgent

## 2023-11-08 NOTE — ED PROVIDER NOTE - PHYSICAL EXAMINATION
GEN: Awake, alert, interactive, NAD.  HEAD AND NECK: NC/AT. Airway patent. Neck supple.   EYES:  Clear b/l. EOMI. PERRL.   ENT: Moist mucus membranes.   CARDIAC: Regular rate, regular rhythm. No evident pedal edema.    RESP/CHEST: Normal respiratory effort with no use of accessory muscles or retractions. Clear throughout on auscultation.  ABD: soft, non-distended, (+) mild generalized abdominal tenderness. No rebound, no guarding.   BACK: No midline spinal TTP. No CVAT.   EXTREMITIES: Moving all extremities with no apparent deformities.   SKIN: Warm, dry, intact normal color. No rash.   NEURO: AOx3, CN II-XII grossly intact, no focal deficits. (+) mild tremors.   PSYCH: Appropriate mood and affect.

## 2023-11-08 NOTE — ED PROVIDER NOTE - CARE PLAN
1 Principal Discharge DX:	Abdominal pain  Secondary Diagnosis:	Alcohol intoxication   Principal Discharge DX:	Pain, dental  Secondary Diagnosis:	Alcohol intoxication  Secondary Diagnosis:	Chronic abdominal pain

## 2023-11-08 NOTE — ED PROVIDER NOTE - CLINICAL SUMMARY MEDICAL DECISION MAKING FREE TEXT BOX
59 y/o male iwht ETOH abuse here with body pain, abdominal pain with nausea, vomiting. Last drink was today.   Vs stable.   Will obtain labs including lipase alcohol, ivf, pepcid, zofran, CIWA score 14. Will treat with Ativan 2mg and reassesses.     Labs reviewed alcohol 296.

## 2023-11-08 NOTE — ED PROVIDER NOTE - OBJECTIVE STATEMENT
59 y/o male with history ETOH abuse and dependence, frequent Ed visits per chart reviewed here with body pain. Pt reports " whole body hurts and abdominal pain. Pt reports having vomiting since yesterday. Denies diarrhea, fever, chills, chest pain, dyspnea. Pt reports drinking few times a week. Last drink was today.

## 2023-11-08 NOTE — ED ADULT NURSE NOTE - OBJECTIVE STATEMENT
PT BIBEMS WITH C/O OF ABD PAIN, NAUSEA, AND VOMITING SINCE YESTERDAY. DRANK A BOTTLE OF VODKA TODAY. HX ALCOHOL ABUSE

## 2023-11-08 NOTE — ED PROVIDER NOTE - PROGRESS NOTE DETAILS
PAULA massey: pt reassessed and reports feeling better. Pt currently denies any abdominal pain. Reports having body pain and dental pain. Pt states was seen here few days ago for dental pain and was given medications. Results reported to patient--grossly benign  Pt. reports feeling better after meds  pt. agrees to f/u with primary care outpt., dental referral given for f/u of dental pain  pt. understands to return to ED if symptoms worsen; will d/c with pen vk and tylenol pt. demonstrates no signs of etoh wd at this time

## 2023-11-09 VITALS
OXYGEN SATURATION: 100 % | TEMPERATURE: 99 F | SYSTOLIC BLOOD PRESSURE: 168 MMHG | HEART RATE: 98 BPM | DIASTOLIC BLOOD PRESSURE: 100 MMHG | RESPIRATION RATE: 18 BRPM

## 2023-11-09 RX ORDER — KETOROLAC TROMETHAMINE 30 MG/ML
15 SYRINGE (ML) INJECTION ONCE
Refills: 0 | Status: DISCONTINUED | OUTPATIENT
Start: 2023-11-09 | End: 2023-11-09

## 2023-11-09 RX ORDER — PENICILLIN V POTASIUM 500 MG/1
1 TABLET OROPHARYNGEAL
Qty: 14 | Refills: 0
Start: 2023-11-09 | End: 2023-11-15

## 2023-11-09 RX ORDER — ACETAMINOPHEN 500 MG
2 TABLET ORAL
Qty: 40 | Refills: 0
Start: 2023-11-09 | End: 2023-11-13

## 2023-11-09 RX ORDER — ACETAMINOPHEN 500 MG
975 TABLET ORAL ONCE
Refills: 0 | Status: COMPLETED | OUTPATIENT
Start: 2023-11-09 | End: 2023-11-09

## 2023-11-09 RX ADMIN — Medication 975 MILLIGRAM(S): at 01:19

## 2023-11-09 RX ADMIN — Medication 15 MILLIGRAM(S): at 05:45

## 2023-11-09 NOTE — ED ADULT NURSE REASSESSMENT NOTE - NS ED NURSE REASSESS COMMENT FT1
pt woken up from sleeping. pt states "give me pain medication". pt proceeds to fall back to sleep. Dr. Shields informed. no acute distress noted. respirations even and unlabored. rise and fall fo chest noted.

## 2023-11-09 NOTE — ED ADULT NURSE REASSESSMENT NOTE - NS ED NURSE REASSESS COMMENT FT1
No acute distress noted. Respirations even and unlabored. pt resting comfortably in stretcher, rise and fall of chest noted.

## 2023-11-09 NOTE — ED ADULT NURSE REASSESSMENT NOTE - NS ED NURSE REASSESS COMMENT FT1
Pt is resting in bed comfortably at this time, no apparent distress noted at this time. pt safety maintained. rise and fall of chest noted.

## 2023-11-09 NOTE — ED ADULT NURSE REASSESSMENT NOTE - NS ED NURSE REASSESS COMMENT FT1
Pt ambulatory with steady gait. no s/s of withdrawal noted. pt Patient denies any sob, difficulty breathing, dizziness, headache, vision changes, cp, palpations, abdominal pain, n/v/d, fever, chills, numbness, tingling, urinary symptoms, LE swelling. pt states "I want to go home". pt given dc instructions. Discharge instructions provided and verbalizes understanding of medication regimen and follow up care. Educational material provided. respirations even and unlabored.

## 2023-11-11 ENCOUNTER — EMERGENCY (EMERGENCY)
Facility: HOSPITAL | Age: 58
LOS: 0 days | Discharge: HOME | End: 2023-11-12
Attending: STUDENT IN AN ORGANIZED HEALTH CARE EDUCATION/TRAINING PROGRAM
Payer: MEDICAID

## 2023-11-11 VITALS
TEMPERATURE: 98 F | RESPIRATION RATE: 16 BRPM | HEIGHT: 67 IN | OXYGEN SATURATION: 95 % | HEART RATE: 110 BPM | SYSTOLIC BLOOD PRESSURE: 133 MMHG | DIASTOLIC BLOOD PRESSURE: 89 MMHG | WEIGHT: 175.93 LBS

## 2023-11-11 DIAGNOSIS — R11.2 NAUSEA WITH VOMITING, UNSPECIFIED: ICD-10-CM
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
## 2023-11-11 DIAGNOSIS — M79.10 MYALGIA, UNSPECIFIED SITE: ICD-10-CM

## 2023-11-11 DIAGNOSIS — R10.84 GENERALIZED ABDOMINAL PAIN: ICD-10-CM

## 2023-11-11 DIAGNOSIS — F10.129 ALCOHOL ABUSE WITH INTOXICATION, UNSPECIFIED: ICD-10-CM

## 2023-11-11 LAB
ALBUMIN SERPL ELPH-MCNC: 3.7 G/DL — SIGNIFICANT CHANGE UP (ref 3.3–5)
ALBUMIN SERPL ELPH-MCNC: 3.7 G/DL — SIGNIFICANT CHANGE UP (ref 3.3–5)
ALP SERPL-CCNC: 56 U/L — SIGNIFICANT CHANGE UP (ref 40–120)
ALP SERPL-CCNC: 56 U/L — SIGNIFICANT CHANGE UP (ref 40–120)
ALT FLD-CCNC: 27 U/L — SIGNIFICANT CHANGE UP (ref 12–78)
ALT FLD-CCNC: 27 U/L — SIGNIFICANT CHANGE UP (ref 12–78)
ANION GAP SERPL CALC-SCNC: 10 MMOL/L — SIGNIFICANT CHANGE UP (ref 5–17)
ANION GAP SERPL CALC-SCNC: 10 MMOL/L — SIGNIFICANT CHANGE UP (ref 5–17)
ANISOCYTOSIS BLD QL: SIGNIFICANT CHANGE UP
ANISOCYTOSIS BLD QL: SIGNIFICANT CHANGE UP
AST SERPL-CCNC: 30 U/L — SIGNIFICANT CHANGE UP (ref 15–37)
AST SERPL-CCNC: 30 U/L — SIGNIFICANT CHANGE UP (ref 15–37)
BASOPHILS # BLD AUTO: 0 K/UL — SIGNIFICANT CHANGE UP (ref 0–0.2)
BASOPHILS # BLD AUTO: 0 K/UL — SIGNIFICANT CHANGE UP (ref 0–0.2)
BASOPHILS NFR BLD AUTO: 0 % — SIGNIFICANT CHANGE UP (ref 0–2)
BASOPHILS NFR BLD AUTO: 0 % — SIGNIFICANT CHANGE UP (ref 0–2)
BILIRUB SERPL-MCNC: 0.3 MG/DL — SIGNIFICANT CHANGE UP (ref 0.2–1.2)
BILIRUB SERPL-MCNC: 0.3 MG/DL — SIGNIFICANT CHANGE UP (ref 0.2–1.2)
BUN SERPL-MCNC: 9 MG/DL — SIGNIFICANT CHANGE UP (ref 7–23)
BUN SERPL-MCNC: 9 MG/DL — SIGNIFICANT CHANGE UP (ref 7–23)
CALCIUM SERPL-MCNC: 8.6 MG/DL — SIGNIFICANT CHANGE UP (ref 8.5–10.1)
CALCIUM SERPL-MCNC: 8.6 MG/DL — SIGNIFICANT CHANGE UP (ref 8.5–10.1)
CHLORIDE SERPL-SCNC: 112 MMOL/L — HIGH (ref 96–108)
CHLORIDE SERPL-SCNC: 112 MMOL/L — HIGH (ref 96–108)
CO2 SERPL-SCNC: 21 MMOL/L — LOW (ref 22–31)
CO2 SERPL-SCNC: 21 MMOL/L — LOW (ref 22–31)
CREAT SERPL-MCNC: 0.96 MG/DL — SIGNIFICANT CHANGE UP (ref 0.5–1.3)
CREAT SERPL-MCNC: 0.96 MG/DL — SIGNIFICANT CHANGE UP (ref 0.5–1.3)
EGFR: 93 ML/MIN/1.73M2 — SIGNIFICANT CHANGE UP
EGFR: 93 ML/MIN/1.73M2 — SIGNIFICANT CHANGE UP
EOSINOPHIL # BLD AUTO: 0.03 K/UL — SIGNIFICANT CHANGE UP (ref 0–0.5)
EOSINOPHIL # BLD AUTO: 0.03 K/UL — SIGNIFICANT CHANGE UP (ref 0–0.5)
EOSINOPHIL NFR BLD AUTO: 1 % — SIGNIFICANT CHANGE UP (ref 0–6)
EOSINOPHIL NFR BLD AUTO: 1 % — SIGNIFICANT CHANGE UP (ref 0–6)
ETHANOL SERPL-MCNC: 332 MG/DL — HIGH (ref 0–10)
ETHANOL SERPL-MCNC: 332 MG/DL — HIGH (ref 0–10)
GLUCOSE SERPL-MCNC: 105 MG/DL — HIGH (ref 70–99)
GLUCOSE SERPL-MCNC: 105 MG/DL — HIGH (ref 70–99)
HCT VFR BLD CALC: 39.3 % — SIGNIFICANT CHANGE UP (ref 39–50)
HCT VFR BLD CALC: 39.3 % — SIGNIFICANT CHANGE UP (ref 39–50)
HGB BLD-MCNC: 12.4 G/DL — LOW (ref 13–17)
HGB BLD-MCNC: 12.4 G/DL — LOW (ref 13–17)
HIV 1 & 2 AB SERPL IA.RAPID: SIGNIFICANT CHANGE UP
HIV 1 & 2 AB SERPL IA.RAPID: SIGNIFICANT CHANGE UP
HYPOCHROMIA BLD QL: SLIGHT — SIGNIFICANT CHANGE UP
HYPOCHROMIA BLD QL: SLIGHT — SIGNIFICANT CHANGE UP
LIDOCAIN IGE QN: 82 U/L — HIGH (ref 13–75)
LIDOCAIN IGE QN: 82 U/L — HIGH (ref 13–75)
LYMPHOCYTES # BLD AUTO: 2.01 K/UL — SIGNIFICANT CHANGE UP (ref 1–3.3)
LYMPHOCYTES # BLD AUTO: 2.01 K/UL — SIGNIFICANT CHANGE UP (ref 1–3.3)
LYMPHOCYTES # BLD AUTO: 65 % — HIGH (ref 13–44)
LYMPHOCYTES # BLD AUTO: 65 % — HIGH (ref 13–44)
MANUAL SMEAR VERIFICATION: SIGNIFICANT CHANGE UP
MANUAL SMEAR VERIFICATION: SIGNIFICANT CHANGE UP
MCHC RBC-ENTMCNC: 23.8 PG — LOW (ref 27–34)
MCHC RBC-ENTMCNC: 23.8 PG — LOW (ref 27–34)
MCHC RBC-ENTMCNC: 31.6 G/DL — LOW (ref 32–36)
MCHC RBC-ENTMCNC: 31.6 G/DL — LOW (ref 32–36)
MCV RBC AUTO: 75.3 FL — LOW (ref 80–100)
MCV RBC AUTO: 75.3 FL — LOW (ref 80–100)
METAMYELOCYTES # FLD: 1 % — HIGH (ref 0–0)
METAMYELOCYTES # FLD: 1 % — HIGH (ref 0–0)
MONOCYTES # BLD AUTO: 0.22 K/UL — SIGNIFICANT CHANGE UP (ref 0–0.9)
MONOCYTES # BLD AUTO: 0.22 K/UL — SIGNIFICANT CHANGE UP (ref 0–0.9)
MONOCYTES NFR BLD AUTO: 7 % — SIGNIFICANT CHANGE UP (ref 2–14)
MONOCYTES NFR BLD AUTO: 7 % — SIGNIFICANT CHANGE UP (ref 2–14)
NEUTROPHILS # BLD AUTO: 0.8 K/UL — LOW (ref 1.8–7.4)
NEUTROPHILS # BLD AUTO: 0.8 K/UL — LOW (ref 1.8–7.4)
NEUTROPHILS NFR BLD AUTO: 26 % — LOW (ref 43–77)
NEUTROPHILS NFR BLD AUTO: 26 % — LOW (ref 43–77)
NRBC # BLD: 0 /100 — SIGNIFICANT CHANGE UP (ref 0–0)
NRBC # BLD: 0 /100 — SIGNIFICANT CHANGE UP (ref 0–0)
NRBC # BLD: SIGNIFICANT CHANGE UP /100 WBCS (ref 0–0)
NRBC # BLD: SIGNIFICANT CHANGE UP /100 WBCS (ref 0–0)
OVALOCYTES BLD QL SMEAR: SLIGHT — SIGNIFICANT CHANGE UP
OVALOCYTES BLD QL SMEAR: SLIGHT — SIGNIFICANT CHANGE UP
PLAT MORPH BLD: NORMAL — SIGNIFICANT CHANGE UP
PLAT MORPH BLD: NORMAL — SIGNIFICANT CHANGE UP
PLATELET # BLD AUTO: 220 K/UL — SIGNIFICANT CHANGE UP (ref 150–400)
PLATELET # BLD AUTO: 220 K/UL — SIGNIFICANT CHANGE UP (ref 150–400)
POIKILOCYTOSIS BLD QL AUTO: SLIGHT — SIGNIFICANT CHANGE UP
POIKILOCYTOSIS BLD QL AUTO: SLIGHT — SIGNIFICANT CHANGE UP
POTASSIUM SERPL-MCNC: 3.7 MMOL/L — SIGNIFICANT CHANGE UP (ref 3.5–5.3)
POTASSIUM SERPL-MCNC: 3.7 MMOL/L — SIGNIFICANT CHANGE UP (ref 3.5–5.3)
POTASSIUM SERPL-SCNC: 3.7 MMOL/L — SIGNIFICANT CHANGE UP (ref 3.5–5.3)
POTASSIUM SERPL-SCNC: 3.7 MMOL/L — SIGNIFICANT CHANGE UP (ref 3.5–5.3)
PROT SERPL-MCNC: 7.7 GM/DL — SIGNIFICANT CHANGE UP (ref 6–8.3)
PROT SERPL-MCNC: 7.7 GM/DL — SIGNIFICANT CHANGE UP (ref 6–8.3)
RBC # BLD: 5.22 M/UL — SIGNIFICANT CHANGE UP (ref 4.2–5.8)
RBC # BLD: 5.22 M/UL — SIGNIFICANT CHANGE UP (ref 4.2–5.8)
RBC # FLD: 21.5 % — HIGH (ref 10.3–14.5)
RBC # FLD: 21.5 % — HIGH (ref 10.3–14.5)
RBC BLD AUTO: ABNORMAL
RBC BLD AUTO: ABNORMAL
SODIUM SERPL-SCNC: 143 MMOL/L — SIGNIFICANT CHANGE UP (ref 135–145)
SODIUM SERPL-SCNC: 143 MMOL/L — SIGNIFICANT CHANGE UP (ref 135–145)
TARGETS BLD QL SMEAR: SLIGHT — SIGNIFICANT CHANGE UP
TARGETS BLD QL SMEAR: SLIGHT — SIGNIFICANT CHANGE UP
WBC # BLD: 3.09 K/UL — LOW (ref 3.8–10.5)
WBC # BLD: 3.09 K/UL — LOW (ref 3.8–10.5)
WBC # FLD AUTO: 3.09 K/UL — LOW (ref 3.8–10.5)
WBC # FLD AUTO: 3.09 K/UL — LOW (ref 3.8–10.5)

## 2023-11-11 PROCEDURE — 99284 EMERGENCY DEPT VISIT MOD MDM: CPT

## 2023-11-11 RX ORDER — KETOROLAC TROMETHAMINE 30 MG/ML
30 SYRINGE (ML) INJECTION ONCE
Refills: 0 | Status: DISCONTINUED | OUTPATIENT
Start: 2023-11-11 | End: 2023-11-11

## 2023-11-11 RX ORDER — ACETAMINOPHEN 500 MG
1000 TABLET ORAL ONCE
Refills: 0 | Status: COMPLETED | OUTPATIENT
Start: 2023-11-11 | End: 2023-11-11

## 2023-11-11 RX ORDER — ONDANSETRON 8 MG/1
4 TABLET, FILM COATED ORAL ONCE
Refills: 0 | Status: COMPLETED | OUTPATIENT
Start: 2023-11-11 | End: 2023-11-11

## 2023-11-11 RX ORDER — SODIUM CHLORIDE 9 MG/ML
1000 INJECTION INTRAMUSCULAR; INTRAVENOUS; SUBCUTANEOUS ONCE
Refills: 0 | Status: COMPLETED | OUTPATIENT
Start: 2023-11-11 | End: 2023-11-11

## 2023-11-11 RX ORDER — FAMOTIDINE 10 MG/ML
20 INJECTION INTRAVENOUS ONCE
Refills: 0 | Status: COMPLETED | OUTPATIENT
Start: 2023-11-11 | End: 2023-11-11

## 2023-11-11 RX ADMIN — FAMOTIDINE 20 MILLIGRAM(S): 10 INJECTION INTRAVENOUS at 18:30

## 2023-11-11 RX ADMIN — Medication 1 MILLIGRAM(S): at 18:30

## 2023-11-11 RX ADMIN — Medication 1000 MILLIGRAM(S): at 18:45

## 2023-11-11 RX ADMIN — SODIUM CHLORIDE 1000 MILLILITER(S): 9 INJECTION INTRAMUSCULAR; INTRAVENOUS; SUBCUTANEOUS at 18:30

## 2023-11-11 RX ADMIN — ONDANSETRON 4 MILLIGRAM(S): 8 TABLET, FILM COATED ORAL at 18:30

## 2023-11-11 RX ADMIN — Medication 30 MILLIGRAM(S): at 23:50

## 2023-11-11 RX ADMIN — SODIUM CHLORIDE 1000 MILLILITER(S): 9 INJECTION INTRAMUSCULAR; INTRAVENOUS; SUBCUTANEOUS at 20:17

## 2023-11-11 RX ADMIN — Medication 400 MILLIGRAM(S): at 18:30

## 2023-11-11 NOTE — ED PROVIDER NOTE - NSFOLLOWUPINSTRUCTIONS_ED_ALL_ED_FT
You were evaluated in the ER for alcohol intoxication and abdominal pain. Your blood tests and exam show no obvious abnormalities. Your blood alcohol level was elevated and you are now clinically sober for discharge home. Follow-up with your primary care doctor. Return to ER for new or worsening symptoms.

## 2023-11-11 NOTE — ED PROVIDER NOTE - PHYSICAL EXAMINATION
General appearance: Nontoxic appearing, conversant, afebrile    Eyes: anicteric sclerae, NUBIA, EOMI   HENT: Atraumatic; oropharynx clear, MMM and no ulcerations, no pharyngeal erythema or exudate   Neck: Trachea midline; Full range of motion, supple   Pulm: CTA bl, normal respiratory effort and no intercostal retractions, normal work of breathing   CV: RRR, No murmurs, rubs, or gallops   Abdomen: Soft, non-tender, non-distended; no guarding or rebound   Extremities: No peripheral edema, no gross deformities, FROM x4   Skin: Dry, normal temperature, turgor and texture; no rash   Psych: Appropriate affect, cooperative

## 2023-11-11 NOTE — ED ADULT NURSE NOTE - NSFALLHARMRISKINTERV_ED_ALL_ED
Assistance OOB with selected safe patient handling equipment if applicable/Assistance with ambulation/Communicate risk of Fall with Harm to all staff, patient, and family/Monitor gait and stability/Monitor for mental status changes and reorient to person, place, and time, as needed/Provide patient with walking aids/Provide visual cue: red socks, yellow wristband, yellow gown, etc/Reinforce activity limits and safety measures with patient and family/Toileting schedule using arm’s reach rule for commode and bathroom/Use of alarms - bed, stretcher, chair and/or video monitoring/Bed in lowest position, wheels locked, appropriate side rails in place/Call bell, personal items and telephone in reach/Instruct patient to call for assistance before getting out of bed/chair/stretcher/Non-slip footwear applied when patient is off stretcher/[place] to call system/Physically safe environment - no spills, clutter or unnecessary equipment/Purposeful Proactive Rounding/Room/bathroom lighting operational, light cord in reach

## 2023-11-11 NOTE — ED PROVIDER NOTE - PROGRESS NOTE DETAILS
MD Forrest: Pt signed out to me by Dr. Martines as etoh intoxication, etoh 300s and abdominal pain. Labs wnl. Tolerating PO. Repeat abd exam benign. Pt has hx drug seeking requesting iv ativan. Will give po librium ciwa 4. Pts son on his way to pick pt up.

## 2023-11-11 NOTE — ED PROVIDER NOTE - CLINICAL SUMMARY MEDICAL DECISION MAKING FREE TEXT BOX
57yo male frequent ED utilizer for alcohol related concerns presenting with body pains, abdominal pain.  Drinking this morning.  Says these symptoms are similar to prior.  Seen 2 days ago for similar.  Has not taken anything for it.  Presentation seems c/w previous presentations. 59yo male frequent ED utilizer for alcohol related concerns presenting with body pains, abdominal pain.  Drinking this morning.  Says these symptoms are similar to prior.  Seen 2 days ago for similar.  Has not taken anything for it.  Presentation seems c/w previous presentations.  Some epigastric ttp, will get labs with lipase although low suspicion acute pathology.  Suspect more alcoholic gastritis.  Does not seem to be clinically withdrawing, appears intoxicated.  Will reassess after meds and labs for sobriety.  Considered but will hold imaging given prior visits with unremarkable scan and lower suspicion at this time.

## 2023-11-11 NOTE — ED ADULT NURSE NOTE - OBJECTIVE STATEMENT
Pt bibems from home c/o abdominal pain, body aches, headache since this morning. Pt also admits to drinking etoh all day. Pt denies cp, sob, dizziness, n/v/d, urinary s/s, numbness/tingling, weakness. Respirations even and unlabored. NAD noted at this time.

## 2023-11-11 NOTE — ED PROVIDER NOTE - PATIENT PORTAL LINK FT
You can access the FollowMyHealth Patient Portal offered by [facility] by registering at the following website: [email or web address]. By joining [employer]’s FollowMyHealth portal, you will also be able to view your health information using other applications (apps) compatible with our system.

## 2023-11-12 VITALS
TEMPERATURE: 98 F | DIASTOLIC BLOOD PRESSURE: 91 MMHG | HEART RATE: 92 BPM | RESPIRATION RATE: 18 BRPM | OXYGEN SATURATION: 97 % | SYSTOLIC BLOOD PRESSURE: 148 MMHG

## 2023-11-12 RX ORDER — SODIUM CHLORIDE 9 MG/ML
3 INJECTION INTRAMUSCULAR; INTRAVENOUS; SUBCUTANEOUS ONCE
Refills: 0 | Status: COMPLETED | OUTPATIENT
Start: 2023-11-12 | End: 2023-11-12

## 2023-11-12 RX ADMIN — Medication 25 MILLIGRAM(S): at 00:52

## 2023-11-12 RX ADMIN — SODIUM CHLORIDE 3 MILLILITER(S): 9 INJECTION INTRAMUSCULAR; INTRAVENOUS; SUBCUTANEOUS at 00:52

## 2023-11-14 ENCOUNTER — INPATIENT (INPATIENT)
Facility: HOSPITAL | Age: 58
LOS: 1 days | Discharge: HOME | End: 2023-11-16
Attending: HOSPITALIST | Admitting: HOSPITALIST
Payer: MEDICAID

## 2023-11-14 VITALS
HEART RATE: 96 BPM | TEMPERATURE: 99 F | RESPIRATION RATE: 20 BRPM | OXYGEN SATURATION: 96 % | HEIGHT: 67 IN | SYSTOLIC BLOOD PRESSURE: 145 MMHG | WEIGHT: 164.91 LBS | DIASTOLIC BLOOD PRESSURE: 98 MMHG

## 2023-11-14 PROCEDURE — 99285 EMERGENCY DEPT VISIT HI MDM: CPT | Mod: 25

## 2023-11-14 RX ORDER — ACETAMINOPHEN 500 MG
975 TABLET ORAL ONCE
Refills: 0 | Status: COMPLETED | OUTPATIENT
Start: 2023-11-14 | End: 2023-11-14

## 2023-11-14 RX ORDER — FAMOTIDINE 10 MG/ML
20 INJECTION INTRAVENOUS ONCE
Refills: 0 | Status: COMPLETED | OUTPATIENT
Start: 2023-11-14 | End: 2023-11-14

## 2023-11-14 RX ORDER — ONDANSETRON 8 MG/1
4 TABLET, FILM COATED ORAL ONCE
Refills: 0 | Status: COMPLETED | OUTPATIENT
Start: 2023-11-14 | End: 2023-11-15

## 2023-11-14 RX ORDER — IOHEXOL 300 MG/ML
30 INJECTION, SOLUTION INTRAVENOUS ONCE
Refills: 0 | Status: DISCONTINUED | OUTPATIENT
Start: 2023-11-14 | End: 2023-11-15

## 2023-11-14 RX ORDER — SODIUM CHLORIDE 9 MG/ML
1000 INJECTION INTRAMUSCULAR; INTRAVENOUS; SUBCUTANEOUS ONCE
Refills: 0 | Status: COMPLETED | OUTPATIENT
Start: 2023-11-14 | End: 2023-11-14

## 2023-11-14 NOTE — ED PROVIDER NOTE - PROGRESS NOTE DETAILS
pt. is showing signs of withdrawal, will given librium and plan for admission, nothing surgical on imaging, pt. stable

## 2023-11-14 NOTE — ED PROVIDER NOTE - CLINICAL SUMMARY MEDICAL DECISION MAKING FREE TEXT BOX
59 yo M with alcohol abuse, abd pain, n/v, concerning for pancreatitis, less likely obstruction, acs, ?early EtOH WD  -cbc, cmp, blood cx, ua, cx, lactate, lipase, finger stick, rvp, trop, type and screen, rvp, trop, CT ab/pel, EKG, hydration, tylenol/pepcid/zofran for pain/n/v, npo, monitor  -f/u results, reeval

## 2023-11-14 NOTE — ED ADULT TRIAGE NOTE - CHIEF COMPLAINT QUOTE
Patient BIBA: reports abdominal pain "for a couple of years." rates pain 10/10. Patient actively vomiting in triage: strong odor of alcohol. Patient reports last drink 1 Week ago.  + headache. FS 77

## 2023-11-14 NOTE — ED PROVIDER NOTE - PHYSICAL EXAMINATION
Vitals: HTN at 145/98, ohtewrise WNL  Gen: AAOx3, NAD, sitting comfortably in stretcher  Head: ncat, perrla, eomi b/l  Neck: supple, no lymphadenopathy, no midline deviation  Heart: rrr, no m/r/g  Lungs: CTA b/l, no rales/ronchi/wheezes  Abd: soft, tender in epigastrium, non-distended, no rebound or guarding  Ext: no clubbing/cyanosis/edema  Neuro: sensation and muscle strength intact b/l, no focal weakness or sensory loss, CN2-12 intact b/l

## 2023-11-14 NOTE — ED PROVIDER NOTE - CARE PLAN
Principal Discharge DX:	Nausea & vomiting  Secondary Diagnosis:	Alcohol intoxication, uncomplicated   1 Principal Discharge DX:	Nausea & vomiting  Secondary Diagnosis:	Gallbladder sludge  Secondary Diagnosis:	Alcohol dependence with withdrawal

## 2023-11-14 NOTE — ED PROVIDER NOTE - OBJECTIVE STATEMENT
59 yo M with diffuse abd pain, n/v.  Pt. admits to drinking alcohol.  No other inciting event.  No radiation of pain, pain focused more on epigastrium,  No other complaints or associated symptoms.  Pt. requests IV pain medication.  Pain presents chronically for years, per patient.    ROS: negative for fever, cough, headache, chest pain, shortness of breath, diarrhea, rash, paresthesia, and focal weakness--all other systems reviewed are negative.   PMH: DTs, EtOh abuse, gastritis, HTN, HLD, PUD; Meds: Denies; SH: Denies smoking/drug use, + chronic EtOH abuse Cheek-To-Nose Interpolation Flap Text: A decision was made to reconstruct the defect utilizing an interpolation axial flap and a staged reconstruction.  A telfa template was made of the defect.  This telfa template was then used to outline the Cheek-To-Nose Interpolation flap.  The donor area for the pedicle flap was then injected with anesthesia.  The flap was excised through the skin and subcutaneous tissue down to the layer of the underlying musculature.  The interpolation flap was carefully excised within this deep plane to maintain its blood supply.  The edges of the donor site were undermined.   The donor site was closed in a primary fashion.  The pedicle was then rotated into position and sutured.  Once the tube was sutured into place, adequate blood supply was confirmed with blanching and refill.  The pedicle was then wrapped with xeroform gauze and dressed appropriately with a telfa and gauze bandage to ensure continued blood supply and protect the attached pedicle.

## 2023-11-15 DIAGNOSIS — R79.89 OTHER SPECIFIED ABNORMAL FINDINGS OF BLOOD CHEMISTRY: ICD-10-CM

## 2023-11-15 DIAGNOSIS — F10.20 ALCOHOL DEPENDENCE, UNCOMPLICATED: ICD-10-CM

## 2023-11-15 DIAGNOSIS — R10.84 GENERALIZED ABDOMINAL PAIN: ICD-10-CM

## 2023-11-15 DIAGNOSIS — E87.6 HYPOKALEMIA: ICD-10-CM

## 2023-11-15 DIAGNOSIS — F10.939 ALCOHOL USE, UNSPECIFIED WITH WITHDRAWAL, UNSPECIFIED: ICD-10-CM

## 2023-11-15 DIAGNOSIS — K92.0 HEMATEMESIS: ICD-10-CM

## 2023-11-15 LAB
ALBUMIN SERPL ELPH-MCNC: 3.6 G/DL — SIGNIFICANT CHANGE UP (ref 3.3–5)
ALBUMIN SERPL ELPH-MCNC: 3.6 G/DL — SIGNIFICANT CHANGE UP (ref 3.3–5)
ALBUMIN SERPL ELPH-MCNC: 4.6 G/DL — SIGNIFICANT CHANGE UP (ref 3.3–5)
ALBUMIN SERPL ELPH-MCNC: 4.6 G/DL — SIGNIFICANT CHANGE UP (ref 3.3–5)
ALP SERPL-CCNC: 51 U/L — SIGNIFICANT CHANGE UP (ref 40–120)
ALP SERPL-CCNC: 51 U/L — SIGNIFICANT CHANGE UP (ref 40–120)
ALP SERPL-CCNC: 60 U/L — SIGNIFICANT CHANGE UP (ref 40–120)
ALP SERPL-CCNC: 60 U/L — SIGNIFICANT CHANGE UP (ref 40–120)
ALT FLD-CCNC: 35 U/L — SIGNIFICANT CHANGE UP (ref 12–78)
ALT FLD-CCNC: 35 U/L — SIGNIFICANT CHANGE UP (ref 12–78)
ALT FLD-CCNC: 38 U/L — SIGNIFICANT CHANGE UP (ref 12–78)
ALT FLD-CCNC: 38 U/L — SIGNIFICANT CHANGE UP (ref 12–78)
ANION GAP SERPL CALC-SCNC: 15 MMOL/L — SIGNIFICANT CHANGE UP (ref 5–17)
ANION GAP SERPL CALC-SCNC: 15 MMOL/L — SIGNIFICANT CHANGE UP (ref 5–17)
ANION GAP SERPL CALC-SCNC: 5 MMOL/L — SIGNIFICANT CHANGE UP (ref 5–17)
ANION GAP SERPL CALC-SCNC: 5 MMOL/L — SIGNIFICANT CHANGE UP (ref 5–17)
ANISOCYTOSIS BLD QL: SLIGHT — SIGNIFICANT CHANGE UP
ANISOCYTOSIS BLD QL: SLIGHT — SIGNIFICANT CHANGE UP
AST SERPL-CCNC: 52 U/L — HIGH (ref 15–37)
AST SERPL-CCNC: 52 U/L — HIGH (ref 15–37)
AST SERPL-CCNC: 53 U/L — HIGH (ref 15–37)
AST SERPL-CCNC: 53 U/L — HIGH (ref 15–37)
BASOPHILS # BLD AUTO: 0.04 K/UL — SIGNIFICANT CHANGE UP (ref 0–0.2)
BASOPHILS # BLD AUTO: 0.04 K/UL — SIGNIFICANT CHANGE UP (ref 0–0.2)
BASOPHILS NFR BLD AUTO: 0.9 % — SIGNIFICANT CHANGE UP (ref 0–2)
BASOPHILS NFR BLD AUTO: 0.9 % — SIGNIFICANT CHANGE UP (ref 0–2)
BILIRUB SERPL-MCNC: 0.5 MG/DL — SIGNIFICANT CHANGE UP (ref 0.2–1.2)
BILIRUB SERPL-MCNC: 0.5 MG/DL — SIGNIFICANT CHANGE UP (ref 0.2–1.2)
BILIRUB SERPL-MCNC: 1 MG/DL — SIGNIFICANT CHANGE UP (ref 0.2–1.2)
BILIRUB SERPL-MCNC: 1 MG/DL — SIGNIFICANT CHANGE UP (ref 0.2–1.2)
BLD GP AB SCN SERPL QL: SIGNIFICANT CHANGE UP
BLD GP AB SCN SERPL QL: SIGNIFICANT CHANGE UP
BUN SERPL-MCNC: 11 MG/DL — SIGNIFICANT CHANGE UP (ref 7–23)
BUN SERPL-MCNC: 11 MG/DL — SIGNIFICANT CHANGE UP (ref 7–23)
BUN SERPL-MCNC: 17 MG/DL — SIGNIFICANT CHANGE UP (ref 7–23)
BUN SERPL-MCNC: 17 MG/DL — SIGNIFICANT CHANGE UP (ref 7–23)
CALCIUM SERPL-MCNC: 8.7 MG/DL — SIGNIFICANT CHANGE UP (ref 8.5–10.1)
CHLORIDE SERPL-SCNC: 104 MMOL/L — SIGNIFICANT CHANGE UP (ref 96–108)
CHLORIDE SERPL-SCNC: 104 MMOL/L — SIGNIFICANT CHANGE UP (ref 96–108)
CHLORIDE SERPL-SCNC: 108 MMOL/L — SIGNIFICANT CHANGE UP (ref 96–108)
CHLORIDE SERPL-SCNC: 108 MMOL/L — SIGNIFICANT CHANGE UP (ref 96–108)
CO2 SERPL-SCNC: 22 MMOL/L — SIGNIFICANT CHANGE UP (ref 22–31)
CO2 SERPL-SCNC: 22 MMOL/L — SIGNIFICANT CHANGE UP (ref 22–31)
CO2 SERPL-SCNC: 27 MMOL/L — SIGNIFICANT CHANGE UP (ref 22–31)
CO2 SERPL-SCNC: 27 MMOL/L — SIGNIFICANT CHANGE UP (ref 22–31)
CREAT SERPL-MCNC: 0.94 MG/DL — SIGNIFICANT CHANGE UP (ref 0.5–1.3)
CREAT SERPL-MCNC: 0.94 MG/DL — SIGNIFICANT CHANGE UP (ref 0.5–1.3)
CREAT SERPL-MCNC: 1.15 MG/DL — SIGNIFICANT CHANGE UP (ref 0.5–1.3)
CREAT SERPL-MCNC: 1.15 MG/DL — SIGNIFICANT CHANGE UP (ref 0.5–1.3)
EGFR: 75 ML/MIN/1.73M2 — SIGNIFICANT CHANGE UP
EGFR: 75 ML/MIN/1.73M2 — SIGNIFICANT CHANGE UP
EGFR: 95 ML/MIN/1.73M2 — SIGNIFICANT CHANGE UP
EGFR: 95 ML/MIN/1.73M2 — SIGNIFICANT CHANGE UP
EOSINOPHIL # BLD AUTO: 0.07 K/UL — SIGNIFICANT CHANGE UP (ref 0–0.5)
EOSINOPHIL # BLD AUTO: 0.07 K/UL — SIGNIFICANT CHANGE UP (ref 0–0.5)
EOSINOPHIL NFR BLD AUTO: 1.5 % — SIGNIFICANT CHANGE UP (ref 0–6)
EOSINOPHIL NFR BLD AUTO: 1.5 % — SIGNIFICANT CHANGE UP (ref 0–6)
ETHANOL SERPL-MCNC: 247 MG/DL — HIGH (ref 0–10)
ETHANOL SERPL-MCNC: 247 MG/DL — HIGH (ref 0–10)
ETHANOL SERPL-MCNC: 36 MG/DL — HIGH (ref 0–10)
ETHANOL SERPL-MCNC: 36 MG/DL — HIGH (ref 0–10)
GLUCOSE BLDC GLUCOMTR-MCNC: 143 MG/DL — HIGH (ref 70–99)
GLUCOSE BLDC GLUCOMTR-MCNC: 143 MG/DL — HIGH (ref 70–99)
GLUCOSE BLDC GLUCOMTR-MCNC: 82 MG/DL — SIGNIFICANT CHANGE UP (ref 70–99)
GLUCOSE BLDC GLUCOMTR-MCNC: 82 MG/DL — SIGNIFICANT CHANGE UP (ref 70–99)
GLUCOSE SERPL-MCNC: 86 MG/DL — SIGNIFICANT CHANGE UP (ref 70–99)
GLUCOSE SERPL-MCNC: 86 MG/DL — SIGNIFICANT CHANGE UP (ref 70–99)
GLUCOSE SERPL-MCNC: 87 MG/DL — SIGNIFICANT CHANGE UP (ref 70–99)
GLUCOSE SERPL-MCNC: 87 MG/DL — SIGNIFICANT CHANGE UP (ref 70–99)
HCT VFR BLD CALC: 33.8 % — LOW (ref 39–50)
HCT VFR BLD CALC: 33.8 % — LOW (ref 39–50)
HCT VFR BLD CALC: 40.6 % — SIGNIFICANT CHANGE UP (ref 39–50)
HCT VFR BLD CALC: 40.6 % — SIGNIFICANT CHANGE UP (ref 39–50)
HGB BLD-MCNC: 11 G/DL — LOW (ref 13–17)
HGB BLD-MCNC: 11 G/DL — LOW (ref 13–17)
HGB BLD-MCNC: 12.8 G/DL — LOW (ref 13–17)
HGB BLD-MCNC: 12.8 G/DL — LOW (ref 13–17)
IMM GRANULOCYTES NFR BLD AUTO: 0.2 % — SIGNIFICANT CHANGE UP (ref 0–0.9)
IMM GRANULOCYTES NFR BLD AUTO: 0.2 % — SIGNIFICANT CHANGE UP (ref 0–0.9)
LACTATE SERPL-SCNC: 1.2 MMOL/L — SIGNIFICANT CHANGE UP (ref 0.7–2)
LACTATE SERPL-SCNC: 1.2 MMOL/L — SIGNIFICANT CHANGE UP (ref 0.7–2)
LACTATE SERPL-SCNC: 3.8 MMOL/L — HIGH (ref 0.7–2)
LACTATE SERPL-SCNC: 3.8 MMOL/L — HIGH (ref 0.7–2)
LACTATE SERPL-SCNC: 8 MMOL/L — CRITICAL HIGH (ref 0.7–2)
LACTATE SERPL-SCNC: 8 MMOL/L — CRITICAL HIGH (ref 0.7–2)
LIDOCAIN IGE QN: 62 U/L — SIGNIFICANT CHANGE UP (ref 13–75)
LIDOCAIN IGE QN: 62 U/L — SIGNIFICANT CHANGE UP (ref 13–75)
LYMPHOCYTES # BLD AUTO: 1.9 K/UL — SIGNIFICANT CHANGE UP (ref 1–3.3)
LYMPHOCYTES # BLD AUTO: 1.9 K/UL — SIGNIFICANT CHANGE UP (ref 1–3.3)
LYMPHOCYTES # BLD AUTO: 40.4 % — SIGNIFICANT CHANGE UP (ref 13–44)
LYMPHOCYTES # BLD AUTO: 40.4 % — SIGNIFICANT CHANGE UP (ref 13–44)
MAGNESIUM SERPL-MCNC: 2 MG/DL — SIGNIFICANT CHANGE UP (ref 1.6–2.6)
MAGNESIUM SERPL-MCNC: 2 MG/DL — SIGNIFICANT CHANGE UP (ref 1.6–2.6)
MANUAL SMEAR VERIFICATION: SIGNIFICANT CHANGE UP
MANUAL SMEAR VERIFICATION: SIGNIFICANT CHANGE UP
MCHC RBC-ENTMCNC: 23.6 PG — LOW (ref 27–34)
MCHC RBC-ENTMCNC: 23.6 PG — LOW (ref 27–34)
MCHC RBC-ENTMCNC: 24.5 PG — LOW (ref 27–34)
MCHC RBC-ENTMCNC: 24.5 PG — LOW (ref 27–34)
MCHC RBC-ENTMCNC: 31.5 G/DL — LOW (ref 32–36)
MCHC RBC-ENTMCNC: 31.5 G/DL — LOW (ref 32–36)
MCHC RBC-ENTMCNC: 32.5 G/DL — SIGNIFICANT CHANGE UP (ref 32–36)
MCHC RBC-ENTMCNC: 32.5 G/DL — SIGNIFICANT CHANGE UP (ref 32–36)
MCV RBC AUTO: 74.9 FL — LOW (ref 80–100)
MCV RBC AUTO: 74.9 FL — LOW (ref 80–100)
MCV RBC AUTO: 75.3 FL — LOW (ref 80–100)
MCV RBC AUTO: 75.3 FL — LOW (ref 80–100)
MICROCYTES BLD QL: SLIGHT — SIGNIFICANT CHANGE UP
MICROCYTES BLD QL: SLIGHT — SIGNIFICANT CHANGE UP
MONOCYTES # BLD AUTO: 0.36 K/UL — SIGNIFICANT CHANGE UP (ref 0–0.9)
MONOCYTES # BLD AUTO: 0.36 K/UL — SIGNIFICANT CHANGE UP (ref 0–0.9)
MONOCYTES NFR BLD AUTO: 7.7 % — SIGNIFICANT CHANGE UP (ref 2–14)
MONOCYTES NFR BLD AUTO: 7.7 % — SIGNIFICANT CHANGE UP (ref 2–14)
NEUTROPHILS # BLD AUTO: 2.32 K/UL — SIGNIFICANT CHANGE UP (ref 1.8–7.4)
NEUTROPHILS # BLD AUTO: 2.32 K/UL — SIGNIFICANT CHANGE UP (ref 1.8–7.4)
NEUTROPHILS NFR BLD AUTO: 49.3 % — SIGNIFICANT CHANGE UP (ref 43–77)
NEUTROPHILS NFR BLD AUTO: 49.3 % — SIGNIFICANT CHANGE UP (ref 43–77)
NRBC # BLD: 0 /100 WBCS — SIGNIFICANT CHANGE UP (ref 0–0)
OVALOCYTES BLD QL SMEAR: SLIGHT — SIGNIFICANT CHANGE UP
OVALOCYTES BLD QL SMEAR: SLIGHT — SIGNIFICANT CHANGE UP
PHOSPHATE SERPL-MCNC: 1.6 MG/DL — LOW (ref 2.5–4.5)
PHOSPHATE SERPL-MCNC: 1.6 MG/DL — LOW (ref 2.5–4.5)
PLAT MORPH BLD: NORMAL — SIGNIFICANT CHANGE UP
PLAT MORPH BLD: NORMAL — SIGNIFICANT CHANGE UP
PLATELET # BLD AUTO: 173 K/UL — SIGNIFICANT CHANGE UP (ref 150–400)
PLATELET # BLD AUTO: 173 K/UL — SIGNIFICANT CHANGE UP (ref 150–400)
PLATELET # BLD AUTO: 247 K/UL — SIGNIFICANT CHANGE UP (ref 150–400)
PLATELET # BLD AUTO: 247 K/UL — SIGNIFICANT CHANGE UP (ref 150–400)
POIKILOCYTOSIS BLD QL AUTO: SLIGHT — SIGNIFICANT CHANGE UP
POIKILOCYTOSIS BLD QL AUTO: SLIGHT — SIGNIFICANT CHANGE UP
POTASSIUM SERPL-MCNC: 3.3 MMOL/L — LOW (ref 3.5–5.3)
POTASSIUM SERPL-MCNC: 3.3 MMOL/L — LOW (ref 3.5–5.3)
POTASSIUM SERPL-MCNC: 3.7 MMOL/L — SIGNIFICANT CHANGE UP (ref 3.5–5.3)
POTASSIUM SERPL-MCNC: 3.7 MMOL/L — SIGNIFICANT CHANGE UP (ref 3.5–5.3)
POTASSIUM SERPL-SCNC: 3.3 MMOL/L — LOW (ref 3.5–5.3)
POTASSIUM SERPL-SCNC: 3.3 MMOL/L — LOW (ref 3.5–5.3)
POTASSIUM SERPL-SCNC: 3.7 MMOL/L — SIGNIFICANT CHANGE UP (ref 3.5–5.3)
POTASSIUM SERPL-SCNC: 3.7 MMOL/L — SIGNIFICANT CHANGE UP (ref 3.5–5.3)
PROT SERPL-MCNC: 7.4 GM/DL — SIGNIFICANT CHANGE UP (ref 6–8.3)
PROT SERPL-MCNC: 7.4 GM/DL — SIGNIFICANT CHANGE UP (ref 6–8.3)
PROT SERPL-MCNC: 8.1 GM/DL — SIGNIFICANT CHANGE UP (ref 6–8.3)
PROT SERPL-MCNC: 8.1 GM/DL — SIGNIFICANT CHANGE UP (ref 6–8.3)
RAPID RVP RESULT: SIGNIFICANT CHANGE UP
RAPID RVP RESULT: SIGNIFICANT CHANGE UP
RBC # BLD: 4.49 M/UL — SIGNIFICANT CHANGE UP (ref 4.2–5.8)
RBC # BLD: 4.49 M/UL — SIGNIFICANT CHANGE UP (ref 4.2–5.8)
RBC # BLD: 5.42 M/UL — SIGNIFICANT CHANGE UP (ref 4.2–5.8)
RBC # BLD: 5.42 M/UL — SIGNIFICANT CHANGE UP (ref 4.2–5.8)
RBC # FLD: 22.1 % — HIGH (ref 10.3–14.5)
RBC # FLD: 22.1 % — HIGH (ref 10.3–14.5)
RBC # FLD: 22.5 % — HIGH (ref 10.3–14.5)
RBC # FLD: 22.5 % — HIGH (ref 10.3–14.5)
RBC BLD AUTO: ABNORMAL
RBC BLD AUTO: ABNORMAL
SARS-COV-2 RNA SPEC QL NAA+PROBE: SIGNIFICANT CHANGE UP
SARS-COV-2 RNA SPEC QL NAA+PROBE: SIGNIFICANT CHANGE UP
SODIUM SERPL-SCNC: 140 MMOL/L — SIGNIFICANT CHANGE UP (ref 135–145)
SODIUM SERPL-SCNC: 140 MMOL/L — SIGNIFICANT CHANGE UP (ref 135–145)
SODIUM SERPL-SCNC: 141 MMOL/L — SIGNIFICANT CHANGE UP (ref 135–145)
SODIUM SERPL-SCNC: 141 MMOL/L — SIGNIFICANT CHANGE UP (ref 135–145)
TARGETS BLD QL SMEAR: SLIGHT — SIGNIFICANT CHANGE UP
TARGETS BLD QL SMEAR: SLIGHT — SIGNIFICANT CHANGE UP
TROPONIN I, HIGH SENSITIVITY RESULT: 13.2 NG/L — SIGNIFICANT CHANGE UP
TROPONIN I, HIGH SENSITIVITY RESULT: 13.2 NG/L — SIGNIFICANT CHANGE UP
WBC # BLD: 4.7 K/UL — SIGNIFICANT CHANGE UP (ref 3.8–10.5)
WBC # BLD: 4.7 K/UL — SIGNIFICANT CHANGE UP (ref 3.8–10.5)
WBC # BLD: 4.98 K/UL — SIGNIFICANT CHANGE UP (ref 3.8–10.5)
WBC # BLD: 4.98 K/UL — SIGNIFICANT CHANGE UP (ref 3.8–10.5)
WBC # FLD AUTO: 4.7 K/UL — SIGNIFICANT CHANGE UP (ref 3.8–10.5)
WBC # FLD AUTO: 4.7 K/UL — SIGNIFICANT CHANGE UP (ref 3.8–10.5)
WBC # FLD AUTO: 4.98 K/UL — SIGNIFICANT CHANGE UP (ref 3.8–10.5)
WBC # FLD AUTO: 4.98 K/UL — SIGNIFICANT CHANGE UP (ref 3.8–10.5)

## 2023-11-15 PROCEDURE — 74176 CT ABD & PELVIS W/O CONTRAST: CPT | Mod: 26,MA

## 2023-11-15 PROCEDURE — 78226 HEPATOBILIARY SYSTEM IMAGING: CPT | Mod: 26

## 2023-11-15 PROCEDURE — 99222 1ST HOSP IP/OBS MODERATE 55: CPT

## 2023-11-15 PROCEDURE — 76705 ECHO EXAM OF ABDOMEN: CPT | Mod: 26

## 2023-11-15 PROCEDURE — 99223 1ST HOSP IP/OBS HIGH 75: CPT

## 2023-11-15 RX ORDER — PANTOPRAZOLE SODIUM 20 MG/1
40 TABLET, DELAYED RELEASE ORAL
Refills: 0 | Status: DISCONTINUED | OUTPATIENT
Start: 2023-11-15 | End: 2023-11-16

## 2023-11-15 RX ORDER — LANOLIN ALCOHOL/MO/W.PET/CERES
3 CREAM (GRAM) TOPICAL AT BEDTIME
Refills: 0 | Status: DISCONTINUED | OUTPATIENT
Start: 2023-11-15 | End: 2023-11-16

## 2023-11-15 RX ORDER — GLUCAGON INJECTION, SOLUTION 0.5 MG/.1ML
1 INJECTION, SOLUTION SUBCUTANEOUS ONCE
Refills: 0 | Status: DISCONTINUED | OUTPATIENT
Start: 2023-11-15 | End: 2023-11-16

## 2023-11-15 RX ORDER — SODIUM CHLORIDE 9 MG/ML
1000 INJECTION, SOLUTION INTRAVENOUS
Refills: 0 | Status: DISCONTINUED | OUTPATIENT
Start: 2023-11-15 | End: 2023-11-16

## 2023-11-15 RX ORDER — ACETAMINOPHEN 500 MG
650 TABLET ORAL EVERY 6 HOURS
Refills: 0 | Status: DISCONTINUED | OUTPATIENT
Start: 2023-11-15 | End: 2023-11-16

## 2023-11-15 RX ORDER — DEXTROSE 50 % IN WATER 50 %
25 SYRINGE (ML) INTRAVENOUS ONCE
Refills: 0 | Status: DISCONTINUED | OUTPATIENT
Start: 2023-11-15 | End: 2023-11-16

## 2023-11-15 RX ORDER — SODIUM CHLORIDE 9 MG/ML
2000 INJECTION INTRAMUSCULAR; INTRAVENOUS; SUBCUTANEOUS ONCE
Refills: 0 | Status: COMPLETED | OUTPATIENT
Start: 2023-11-15 | End: 2023-11-15

## 2023-11-15 RX ORDER — METOCLOPRAMIDE HCL 10 MG
10 TABLET ORAL ONCE
Refills: 0 | Status: DISCONTINUED | OUTPATIENT
Start: 2023-11-15 | End: 2023-11-15

## 2023-11-15 RX ORDER — DEXTROSE 50 % IN WATER 50 %
15 SYRINGE (ML) INTRAVENOUS ONCE
Refills: 0 | Status: DISCONTINUED | OUTPATIENT
Start: 2023-11-15 | End: 2023-11-16

## 2023-11-15 RX ORDER — THIAMINE MONONITRATE (VIT B1) 100 MG
100 TABLET ORAL DAILY
Refills: 0 | Status: DISCONTINUED | OUTPATIENT
Start: 2023-11-15 | End: 2023-11-16

## 2023-11-15 RX ORDER — ONDANSETRON 8 MG/1
4 TABLET, FILM COATED ORAL EVERY 6 HOURS
Refills: 0 | Status: DISCONTINUED | OUTPATIENT
Start: 2023-11-15 | End: 2023-11-16

## 2023-11-15 RX ORDER — OXYCODONE HYDROCHLORIDE 5 MG/1
5 TABLET ORAL EVERY 8 HOURS
Refills: 0 | Status: DISCONTINUED | OUTPATIENT
Start: 2023-11-15 | End: 2023-11-16

## 2023-11-15 RX ORDER — ACETAMINOPHEN 500 MG
1000 TABLET ORAL ONCE
Refills: 0 | Status: COMPLETED | OUTPATIENT
Start: 2023-11-15 | End: 2023-11-15

## 2023-11-15 RX ORDER — POTASSIUM CHLORIDE 20 MEQ
10 PACKET (EA) ORAL ONCE
Refills: 0 | Status: COMPLETED | OUTPATIENT
Start: 2023-11-15 | End: 2023-11-15

## 2023-11-15 RX ORDER — HALOPERIDOL DECANOATE 100 MG/ML
5 INJECTION INTRAMUSCULAR ONCE
Refills: 0 | Status: COMPLETED | OUTPATIENT
Start: 2023-11-15 | End: 2023-11-15

## 2023-11-15 RX ORDER — DEXTROSE 50 % IN WATER 50 %
12.5 SYRINGE (ML) INTRAVENOUS ONCE
Refills: 0 | Status: DISCONTINUED | OUTPATIENT
Start: 2023-11-15 | End: 2023-11-16

## 2023-11-15 RX ORDER — FOLIC ACID 0.8 MG
1 TABLET ORAL DAILY
Refills: 0 | Status: DISCONTINUED | OUTPATIENT
Start: 2023-11-15 | End: 2023-11-16

## 2023-11-15 RX ADMIN — OXYCODONE HYDROCHLORIDE 5 MILLIGRAM(S): 5 TABLET ORAL at 17:05

## 2023-11-15 RX ADMIN — Medication 1 TABLET(S): at 11:32

## 2023-11-15 RX ADMIN — PANTOPRAZOLE SODIUM 40 MILLIGRAM(S): 20 TABLET, DELAYED RELEASE ORAL at 11:31

## 2023-11-15 RX ADMIN — ONDANSETRON 4 MILLIGRAM(S): 8 TABLET, FILM COATED ORAL at 00:38

## 2023-11-15 RX ADMIN — OXYCODONE HYDROCHLORIDE 5 MILLIGRAM(S): 5 TABLET ORAL at 18:05

## 2023-11-15 RX ADMIN — HALOPERIDOL DECANOATE 5 MILLIGRAM(S): 100 INJECTION INTRAMUSCULAR at 01:48

## 2023-11-15 RX ADMIN — Medication 100 MILLIEQUIVALENT(S): at 03:43

## 2023-11-15 RX ADMIN — SODIUM CHLORIDE 2000 MILLILITER(S): 9 INJECTION INTRAMUSCULAR; INTRAVENOUS; SUBCUTANEOUS at 03:43

## 2023-11-15 RX ADMIN — Medication 2 MILLIGRAM(S): at 11:31

## 2023-11-15 RX ADMIN — PANTOPRAZOLE SODIUM 40 MILLIGRAM(S): 20 TABLET, DELAYED RELEASE ORAL at 17:06

## 2023-11-15 RX ADMIN — SODIUM CHLORIDE 125 MILLILITER(S): 9 INJECTION, SOLUTION INTRAVENOUS at 17:05

## 2023-11-15 RX ADMIN — FAMOTIDINE 20 MILLIGRAM(S): 10 INJECTION INTRAVENOUS at 00:38

## 2023-11-15 RX ADMIN — SODIUM CHLORIDE 125 MILLILITER(S): 9 INJECTION, SOLUTION INTRAVENOUS at 11:59

## 2023-11-15 RX ADMIN — SODIUM CHLORIDE 1000 MILLILITER(S): 9 INJECTION INTRAMUSCULAR; INTRAVENOUS; SUBCUTANEOUS at 00:38

## 2023-11-15 RX ADMIN — Medication 400 MILLIGRAM(S): at 01:48

## 2023-11-15 RX ADMIN — Medication 100 MILLIGRAM(S): at 11:32

## 2023-11-15 RX ADMIN — Medication 1 MILLIGRAM(S): at 11:33

## 2023-11-15 RX ADMIN — ONDANSETRON 4 MILLIGRAM(S): 8 TABLET, FILM COATED ORAL at 11:44

## 2023-11-15 RX ADMIN — Medication 50 MILLIGRAM(S): at 06:27

## 2023-11-15 NOTE — H&P ADULT - HISTORY OF PRESENT ILLNESS
58 years old male with h/o alcohol dependence with h/o alcohol withdrawal required ICU admission present to ED with complain of nausea, vomiting and abdominal pain. Patient reported multiple episode of vomiting with occasional streak of blood/black material for last few days, associated with abdominal pain. Reported drinking 1-2 bottle of vodka daily and last drink was 2 days ago ( but initial serum alcohol elevated). While in ED, patient developed alcohol withdrawal  Hemodynamically stable, afebrile, sat well at RA. No leukocygosis, Hb 12.8, K 3.3, Cr 1.15, lactate 8, serum alcohol 247 ( Nov/15/23 at 00:36AM). RVP negative. CT abd/pelvis noted hyperdense material within the gallbladder, which may represent sludge. Otherwise no acute pathology. RUQ ultrasound with No cholelithiasis or evidence of   cholecystitis. Gibson sign was positive according to the technologist.    SH: alcohol use

## 2023-11-15 NOTE — BH CONSULTATION LIAISON ASSESSMENT NOTE - HPI (INCLUDE ILLNESS QUALITY, SEVERITY, DURATION, TIMING, CONTEXT, MODIFYING FACTORS, ASSOCIATED SIGNS AND SYMPTOMS)
PATIENT KNOWN TO WRITER FROM PRIOR ADMISSIONS; COMPLEX CARE CASE: 59 yo [place] male, lives with his son in a private home, works as a , with long history of continuous Alcohol Abuse spanning decades (at most reports 2-3 bottles of vodka 1-2/day), with associated numerous ED visits and hospital admissions (ie. epigastric pain, vomiting, nausea, hematemesis, aspiration pneumonia, esophageal ulcer, UGI bleed, MSSA aspiration PNA, ICU admissions), has had BALs > 400 before, MANY	ED presentations usually for GI complaints and usually in state of alcohol intoxication, gets frequently medically admitted who returns late last night and triaged as "Patient BIBA: reports abdominal pain "for a couple of years." rates pain 10/10. Patient actively vomiting in triage: strong odor of alcohol." , lactate 8. Receive PO Librium 50mg at 6am and Ativan 2mg IVP at 11am. PLaces on symptoms triggered CIWA as per Complex Care recs.

## 2023-11-15 NOTE — H&P ADULT - PROBLEM SELECTOR PLAN 5
repeat ordered--> pending, discussed with RN  Continue gentle IV hydration  Suspect due to alcohol lactic acidosis

## 2023-11-15 NOTE — H&P ADULT - PROBLEM SELECTOR PLAN 3
Generalized abd pain, more in epigastric region  Abd exam with slight tenderness, no guarding or rigidity  CT abd/pelvis  ( I personally review) noted hyperdense material within the gallbladder, which may represent sludge. Otherwise no acute pathology. RUQ ultrasound with No cholelithiasis or evidence of  cholecystitis. Gibson sign was positive according to the technologist  Negative Gibson's sign during my exam. Afebrile, no leukocytosis  Check HIDA  IV fluid, NPO except meds  POC glucose and hypoglycemia protocol  IV pantoprazole, maalox prn

## 2023-11-15 NOTE — ED ADULT NURSE REASSESSMENT NOTE - NS ED NURSE REASSESS COMMENT FT1
Pt states he is unable to tolerate oral contrast at this time. Multiple attempts made to educate pt on importance of using oral contrast for ct scan. pt refusing at this time. Dr. Shields informed. No acute distress noted. respirations even and unlabored.

## 2023-11-15 NOTE — BH CONSULTATION LIAISON ASSESSMENT NOTE - NSBHCHARTREVIEWVS_PSY_A_CORE FT
Vital Signs Last 24 Hrs  T(C): 37.2 (15 Nov 2023 12:06), Max: 37.3 (14 Nov 2023 23:25)  T(F): 98.9 (15 Nov 2023 12:06), Max: 99.1 (14 Nov 2023 23:25)  HR: 82 (15 Nov 2023 12:06) (82 - 96)  BP: 141/74 (15 Nov 2023 12:06) (141/74 - 155/92)  BP(mean): --  RR: 17 (15 Nov 2023 12:06) (15 - 20)  SpO2: 97% (15 Nov 2023 09:54) (95% - 100%)    Parameters below as of 15 Nov 2023 12:06  Patient On (Oxygen Delivery Method): room air

## 2023-11-15 NOTE — ED ADULT NURSE REASSESSMENT NOTE - NS ED NURSE REASSESS COMMENT FT1
Placed 18 Gauge catheter to left AC. As I went to secure it patient pulled it out stated: "no more anything until I get pain medication." Dr. Shields made aware.

## 2023-11-15 NOTE — H&P ADULT - ASSESSMENT
58 years old male with h/o alcohol dependence with h/o alcohol withdrawal required ICU admission present to ED with complain of nausea, vomiting and abdominal pain. Patient reported multiple episode of vomiting with occasional streak of blood/black material for last few days, associated with abdominal pain. Reported drinking 1-2 bottle of vodka daily and last drink was 2 days ago ( but initial serum alcohol elevated). While in ED, patient developed alcohol withdrawal  Hemodynamically stable, afebrile, sat well at RA. No leukocygosis, Hb 12.8, K 3.3, Cr 1.15, lactate 8, serum alcohol 247 ( Nov/15/23 at 00:36AM). RVP negative. CT abd/pelvis noted hyperdense material within the gallbladder, which may represent sludge. Otherwise no acute pathology. RUQ ultrasound with No cholelithiasis or evidence of   cholecystitis. Gibson sign was positive according to the technologist.

## 2023-11-15 NOTE — H&P ADULT - PROBLEM SELECTOR PLAN 2
reported streak of blood/ coffee ground emesis  Hb Stable  ? mattie barragan due to excessive vomiting  IV pantoprazole 40mg bid  Monitor H/H, active type/screen  Maalox and zofran prn  IV fluid  If further episode of vomiting, will check if truly coffee ground emesis. Provider to RN communication placed

## 2023-11-15 NOTE — PATIENT PROFILE ADULT - FUNCTIONAL ASSESSMENT - BASIC MOBILITY 6.
4-calculated by average/Not able to assess (calculate score using [facility] averaging method)

## 2023-11-15 NOTE — PATIENT PROFILE ADULT - FALL HARM RISK - HARM RISK INTERVENTIONS
Assistance with ambulation/Assistance OOB with selected safe patient handling equipment/Communicate Risk of Fall with Harm to all staff/Monitor for mental status changes/Monitor gait and stability/Reinforce activity limits and safety measures with patient and family/Tailored Fall Risk Interventions/Toileting schedule using arm’s reach rule for commode and bathroom/Use of alarms - bed, chair and/or voice tab/Visual Cue: Yellow wristband and red socks/Bed in lowest position, wheels locked, appropriate side rails in place/Call bell, personal items and telephone in reach/Instruct patient to call for assistance before getting out of bed or chair/Non-slip footwear when patient is out of bed/[place] to call system/Physically safe environment - no spills, clutter or unnecessary equipment/Purposeful Proactive Rounding/Room/bathroom lighting operational, light cord in reach

## 2023-11-15 NOTE — BH CONSULTATION LIAISON ASSESSMENT NOTE - CURRENT MEDICATION
MEDICATIONS  (STANDING):  dextrose 50% Injectable 12.5 Gram(s) IV Push once  dextrose 50% Injectable 25 Gram(s) IV Push once  dextrose 50% Injectable 25 Gram(s) IV Push once  dextrose Oral Gel 15 Gram(s) Oral once  folic acid 1 milliGRAM(s) Oral daily  glucagon  Injectable 1 milliGRAM(s) IntraMuscular once  lactated ringers. 1000 milliLiter(s) (125 mL/Hr) IV Continuous <Continuous>  multivitamin 1 Tablet(s) Oral daily  pantoprazole  Injectable 40 milliGRAM(s) IV Push two times a day  thiamine 100 milliGRAM(s) Oral daily    MEDICATIONS  (PRN):  acetaminophen     Tablet .. 650 milliGRAM(s) Oral every 6 hours PRN Mild Pain (1 - 3), Moderate Pain (4 - 6)  aluminum hydroxide/magnesium hydroxide/simethicone Suspension 30 milliLiter(s) Oral every 4 hours PRN Dyspepsia  melatonin 3 milliGRAM(s) Oral at bedtime PRN Insomnia  ondansetron Injectable 4 milliGRAM(s) IV Push every 6 hours PRN Nausea and/or Vomiting  oxyCODONE    IR 5 milliGRAM(s) Oral every 8 hours PRN Severe Pain (7 - 10)

## 2023-11-15 NOTE — ED ADULT NURSE NOTE - OBJECTIVE STATEMENT
Patient BIBA Pt reports abdominal pain "for a couple of years." Pt rates pain 10/10. Patient actively vomiting in pt room and strong odor of alcohol noted. Patient reports last drink 1 Week ago. Pt c/o headache. FS 77. Pt denies chest pain, difficulty breathing or SOB.

## 2023-11-15 NOTE — BH CONSULTATION LIAISON ASSESSMENT NOTE - NSBHCONSULTRECOMMENDOTHER_PSY_A_CORE FT
- HIDA normal, lactate down to 36  - on sxs triggered CIWA  - recommending nurse take a picture of reported "coffee ground emesis" and send to providers. Patient had reported coffee ground emesis before on prior admissions which turned out to be food particles and not actual blood   - once medically stable and cleared, discharge home...Pt immediately resumes drinking hence low risk for any withdrawal once discharged

## 2023-11-15 NOTE — BH CONSULTATION LIAISON ASSESSMENT NOTE - SUMMARY
Decades of heavy alcohol use disorder with low motivation to achieve sobriety/abstinence with chronic medical sequela resulting in frequenct ED presentations and medical admissions.

## 2023-11-15 NOTE — H&P ADULT - PROBLEM SELECTOR PLAN 1
patient developed alcohol withdrawal in ED, improved with benzo  Serum alcohol 247 ( Nov/15/23 at 00:36AM)  Complex care note reviewed  Discussed with psych, ativan IV 2mg once for now. Dr Hernandez will evaluate patient and decide on further dosing of benzo  Thiamine, folic acid, multivitamin  Monitor for DT  gentle IV hydration

## 2023-11-15 NOTE — ED ADULT NURSE REASSESSMENT NOTE - NS ED NURSE REASSESS COMMENT FT1
Pt refusing EKG at this time. Dr. Shields informed. Pt refusing EKG at this time. Multiple attempts to educate pt on importance of obtaining ekg. pt refusing. Dr. Shields informed.

## 2023-11-15 NOTE — BH CONSULTATION LIAISON ASSESSMENT NOTE - NSBHCHARTREVIEWLAB_PSY_A_CORE FT
07-30    139  |  107  |  15  ----------------------------<  75  4.1   |  21<L>  |  0.87    Ca    9.3      30 Jul 2023 06:30  Phos  2.7     07-30  Mg     2.0     07-30    TPro  8.1  /  Alb  3.4  /  TBili  0.3  /  DBili  x   /  AST  41<H>  /  ALT  39  /  AlkPhos  62  07-30

## 2023-11-15 NOTE — ED ADULT NURSE REASSESSMENT NOTE - NS ED NURSE REASSESS COMMENT FT1
pt refused cardiac monitor at this time. pt educated on importance of wearing cardiac monitor. no acute distress noted. respirations even and unlabored.

## 2023-11-15 NOTE — ED ADULT NURSE REASSESSMENT NOTE - NS ED NURSE REASSESS COMMENT FT1
Received report from PRETTY Lott, pt awake and alert, pt resting comfortably in bed, even and unlabored respirations noted, NS running.

## 2023-11-15 NOTE — ED ADULT NURSE NOTE - NSFALLRISKINTERV_ED_ALL_ED
Assistance OOB with selected safe patient handling equipment if applicable/Assistance with ambulation/Communicate fall risk and risk factors to all staff, patient, and family/Monitor gait and stability/Monitor for mental status changes and reorient to person, place, and time, as needed/Provide visual cue: yellow wristband, yellow gown, etc/Reinforce activity limits and safety measures with patient and family/Toileting schedule using arm’s reach rule for commode and bathroom/Use of alarms - bed, stretcher, chair and/or video monitoring/Call bell, personal items and telephone in reach/Instruct patient to call for assistance before getting out of bed/chair/stretcher/Non-slip footwear applied when patient is off stretcher/[place] to call system/Physically safe environment - no spills, clutter or unnecessary equipment/Purposeful Proactive Rounding/Room/bathroom lighting operational, light cord in reach

## 2023-11-15 NOTE — H&P ADULT - NSHPPHYSICALEXAM_GEN_ALL_CORE
CONSTITUTIONAL: alert and cooperative, no acute distress.  EYES: PERRL, no scleral icterus  ENT: Mucosa moist, tongue normal.  NECK: Neck supple, trachea midline, non-tender  CARDIAC: Normal S1 and S2. Regular rate and rhythms. No Pedal edema  LUNGS: Equal air entry both lungs. No rales, rhonchi, wheezing. Normal respiratory effort.   ABDOMEN: Very mild generalized tenderness+. No guarding or rebound tenderness. Negative Gibson's sign. No hepatomegaly or splenomegaly. Bowel sound normal.  MUSCULOSKELETAL: Normocephalic, atraumatic. No significant deformity or joint abnormality  NEUROLOGICAL: No gross motor or sensory deficits  SKIN: no lesions or eruptions. Normal turgor  PSYCHIATRIC: A&O x 3, appropriate mood and affect

## 2023-11-16 ENCOUNTER — TRANSCRIPTION ENCOUNTER (OUTPATIENT)
Age: 58
End: 2023-11-16

## 2023-11-16 VITALS — WEIGHT: 162.26 LBS

## 2023-11-16 LAB
ALBUMIN SERPL ELPH-MCNC: 3.1 G/DL — LOW (ref 3.3–5)
ALBUMIN SERPL ELPH-MCNC: 3.1 G/DL — LOW (ref 3.3–5)
ALP SERPL-CCNC: 48 U/L — SIGNIFICANT CHANGE UP (ref 40–120)
ALP SERPL-CCNC: 48 U/L — SIGNIFICANT CHANGE UP (ref 40–120)
ALT FLD-CCNC: 32 U/L — SIGNIFICANT CHANGE UP (ref 12–78)
ALT FLD-CCNC: 32 U/L — SIGNIFICANT CHANGE UP (ref 12–78)
AMPHET UR-MCNC: NEGATIVE — SIGNIFICANT CHANGE UP
AMPHET UR-MCNC: NEGATIVE — SIGNIFICANT CHANGE UP
ANION GAP SERPL CALC-SCNC: 6 MMOL/L — SIGNIFICANT CHANGE UP (ref 5–17)
ANION GAP SERPL CALC-SCNC: 6 MMOL/L — SIGNIFICANT CHANGE UP (ref 5–17)
APPEARANCE UR: CLEAR — SIGNIFICANT CHANGE UP
APPEARANCE UR: CLEAR — SIGNIFICANT CHANGE UP
AST SERPL-CCNC: 48 U/L — HIGH (ref 15–37)
AST SERPL-CCNC: 48 U/L — HIGH (ref 15–37)
BARBITURATES UR SCN-MCNC: NEGATIVE — SIGNIFICANT CHANGE UP
BARBITURATES UR SCN-MCNC: NEGATIVE — SIGNIFICANT CHANGE UP
BENZODIAZ UR-MCNC: POSITIVE — SIGNIFICANT CHANGE UP
BENZODIAZ UR-MCNC: POSITIVE — SIGNIFICANT CHANGE UP
BILIRUB SERPL-MCNC: 1 MG/DL — SIGNIFICANT CHANGE UP (ref 0.2–1.2)
BILIRUB SERPL-MCNC: 1 MG/DL — SIGNIFICANT CHANGE UP (ref 0.2–1.2)
BILIRUB UR-MCNC: NEGATIVE — SIGNIFICANT CHANGE UP
BILIRUB UR-MCNC: NEGATIVE — SIGNIFICANT CHANGE UP
BUN SERPL-MCNC: 9 MG/DL — SIGNIFICANT CHANGE UP (ref 7–23)
BUN SERPL-MCNC: 9 MG/DL — SIGNIFICANT CHANGE UP (ref 7–23)
CALCIUM SERPL-MCNC: 8.6 MG/DL — SIGNIFICANT CHANGE UP (ref 8.5–10.1)
CALCIUM SERPL-MCNC: 8.6 MG/DL — SIGNIFICANT CHANGE UP (ref 8.5–10.1)
CHLORIDE SERPL-SCNC: 106 MMOL/L — SIGNIFICANT CHANGE UP (ref 96–108)
CHLORIDE SERPL-SCNC: 106 MMOL/L — SIGNIFICANT CHANGE UP (ref 96–108)
CO2 SERPL-SCNC: 26 MMOL/L — SIGNIFICANT CHANGE UP (ref 22–31)
CO2 SERPL-SCNC: 26 MMOL/L — SIGNIFICANT CHANGE UP (ref 22–31)
COCAINE METAB.OTHER UR-MCNC: NEGATIVE — SIGNIFICANT CHANGE UP
COCAINE METAB.OTHER UR-MCNC: NEGATIVE — SIGNIFICANT CHANGE UP
COLOR SPEC: YELLOW — SIGNIFICANT CHANGE UP
COLOR SPEC: YELLOW — SIGNIFICANT CHANGE UP
CREAT SERPL-MCNC: 0.85 MG/DL — SIGNIFICANT CHANGE UP (ref 0.5–1.3)
CREAT SERPL-MCNC: 0.85 MG/DL — SIGNIFICANT CHANGE UP (ref 0.5–1.3)
DIFF PNL FLD: NEGATIVE — SIGNIFICANT CHANGE UP
DIFF PNL FLD: NEGATIVE — SIGNIFICANT CHANGE UP
EGFR: 102 ML/MIN/1.73M2 — SIGNIFICANT CHANGE UP
EGFR: 102 ML/MIN/1.73M2 — SIGNIFICANT CHANGE UP
GLUCOSE BLDC GLUCOMTR-MCNC: 94 MG/DL — SIGNIFICANT CHANGE UP (ref 70–99)
GLUCOSE BLDC GLUCOMTR-MCNC: 94 MG/DL — SIGNIFICANT CHANGE UP (ref 70–99)
GLUCOSE SERPL-MCNC: 96 MG/DL — SIGNIFICANT CHANGE UP (ref 70–99)
GLUCOSE SERPL-MCNC: 96 MG/DL — SIGNIFICANT CHANGE UP (ref 70–99)
GLUCOSE UR QL: NEGATIVE MG/DL — SIGNIFICANT CHANGE UP
GLUCOSE UR QL: NEGATIVE MG/DL — SIGNIFICANT CHANGE UP
HCT VFR BLD CALC: 35.3 % — LOW (ref 39–50)
HCT VFR BLD CALC: 35.3 % — LOW (ref 39–50)
HGB BLD-MCNC: 11 G/DL — LOW (ref 13–17)
HGB BLD-MCNC: 11 G/DL — LOW (ref 13–17)
KETONES UR-MCNC: 40 MG/DL
KETONES UR-MCNC: 40 MG/DL
LEUKOCYTE ESTERASE UR-ACNC: NEGATIVE — SIGNIFICANT CHANGE UP
LEUKOCYTE ESTERASE UR-ACNC: NEGATIVE — SIGNIFICANT CHANGE UP
MAGNESIUM SERPL-MCNC: 1.8 MG/DL — SIGNIFICANT CHANGE UP (ref 1.6–2.6)
MAGNESIUM SERPL-MCNC: 1.8 MG/DL — SIGNIFICANT CHANGE UP (ref 1.6–2.6)
MCHC RBC-ENTMCNC: 24 PG — LOW (ref 27–34)
MCHC RBC-ENTMCNC: 24 PG — LOW (ref 27–34)
MCHC RBC-ENTMCNC: 31.2 G/DL — LOW (ref 32–36)
MCHC RBC-ENTMCNC: 31.2 G/DL — LOW (ref 32–36)
MCV RBC AUTO: 76.9 FL — LOW (ref 80–100)
MCV RBC AUTO: 76.9 FL — LOW (ref 80–100)
METHADONE UR-MCNC: NEGATIVE — SIGNIFICANT CHANGE UP
METHADONE UR-MCNC: NEGATIVE — SIGNIFICANT CHANGE UP
NITRITE UR-MCNC: NEGATIVE — SIGNIFICANT CHANGE UP
NITRITE UR-MCNC: NEGATIVE — SIGNIFICANT CHANGE UP
NRBC # BLD: 0 /100 WBCS — SIGNIFICANT CHANGE UP (ref 0–0)
NRBC # BLD: 0 /100 WBCS — SIGNIFICANT CHANGE UP (ref 0–0)
OPIATES UR-MCNC: NEGATIVE — SIGNIFICANT CHANGE UP
OPIATES UR-MCNC: NEGATIVE — SIGNIFICANT CHANGE UP
PCP SPEC-MCNC: SIGNIFICANT CHANGE UP
PCP SPEC-MCNC: SIGNIFICANT CHANGE UP
PCP UR-MCNC: NEGATIVE — SIGNIFICANT CHANGE UP
PCP UR-MCNC: NEGATIVE — SIGNIFICANT CHANGE UP
PH UR: 7 — SIGNIFICANT CHANGE UP (ref 5–8)
PH UR: 7 — SIGNIFICANT CHANGE UP (ref 5–8)
PHOSPHATE SERPL-MCNC: 1.9 MG/DL — LOW (ref 2.5–4.5)
PHOSPHATE SERPL-MCNC: 1.9 MG/DL — LOW (ref 2.5–4.5)
PLATELET # BLD AUTO: 132 K/UL — LOW (ref 150–400)
PLATELET # BLD AUTO: 132 K/UL — LOW (ref 150–400)
POTASSIUM SERPL-MCNC: 3.6 MMOL/L — SIGNIFICANT CHANGE UP (ref 3.5–5.3)
POTASSIUM SERPL-MCNC: 3.6 MMOL/L — SIGNIFICANT CHANGE UP (ref 3.5–5.3)
POTASSIUM SERPL-SCNC: 3.6 MMOL/L — SIGNIFICANT CHANGE UP (ref 3.5–5.3)
POTASSIUM SERPL-SCNC: 3.6 MMOL/L — SIGNIFICANT CHANGE UP (ref 3.5–5.3)
PROT SERPL-MCNC: 6.7 GM/DL — SIGNIFICANT CHANGE UP (ref 6–8.3)
PROT SERPL-MCNC: 6.7 GM/DL — SIGNIFICANT CHANGE UP (ref 6–8.3)
PROT UR-MCNC: NEGATIVE MG/DL — SIGNIFICANT CHANGE UP
PROT UR-MCNC: NEGATIVE MG/DL — SIGNIFICANT CHANGE UP
RBC # BLD: 4.59 M/UL — SIGNIFICANT CHANGE UP (ref 4.2–5.8)
RBC # BLD: 4.59 M/UL — SIGNIFICANT CHANGE UP (ref 4.2–5.8)
RBC # FLD: 22.2 % — HIGH (ref 10.3–14.5)
RBC # FLD: 22.2 % — HIGH (ref 10.3–14.5)
SODIUM SERPL-SCNC: 138 MMOL/L — SIGNIFICANT CHANGE UP (ref 135–145)
SODIUM SERPL-SCNC: 138 MMOL/L — SIGNIFICANT CHANGE UP (ref 135–145)
SP GR SPEC: 1.02 — SIGNIFICANT CHANGE UP (ref 1–1.03)
SP GR SPEC: 1.02 — SIGNIFICANT CHANGE UP (ref 1–1.03)
THC UR QL: NEGATIVE — SIGNIFICANT CHANGE UP
THC UR QL: NEGATIVE — SIGNIFICANT CHANGE UP
UROBILINOGEN FLD QL: 1 MG/DL — SIGNIFICANT CHANGE UP (ref 0.2–1)
UROBILINOGEN FLD QL: 1 MG/DL — SIGNIFICANT CHANGE UP (ref 0.2–1)
WBC # BLD: 2.88 K/UL — LOW (ref 3.8–10.5)
WBC # BLD: 2.88 K/UL — LOW (ref 3.8–10.5)
WBC # FLD AUTO: 2.88 K/UL — LOW (ref 3.8–10.5)
WBC # FLD AUTO: 2.88 K/UL — LOW (ref 3.8–10.5)

## 2023-11-16 PROCEDURE — 99231 SBSQ HOSP IP/OBS SF/LOW 25: CPT

## 2023-11-16 PROCEDURE — 99239 HOSP IP/OBS DSCHRG MGMT >30: CPT

## 2023-11-16 RX ORDER — THIAMINE MONONITRATE (VIT B1) 100 MG
1 TABLET ORAL
Qty: 0 | Refills: 0 | DISCHARGE
Start: 2023-11-16

## 2023-11-16 RX ORDER — SUCRALFATE 1 G
1 TABLET ORAL
Refills: 0 | Status: DISCONTINUED | OUTPATIENT
Start: 2023-11-16 | End: 2023-11-16

## 2023-11-16 RX ORDER — FOLIC ACID 0.8 MG
1 TABLET ORAL
Qty: 0 | Refills: 0 | DISCHARGE
Start: 2023-11-16

## 2023-11-16 RX ORDER — SODIUM,POTASSIUM PHOSPHATES 278-250MG
1 POWDER IN PACKET (EA) ORAL ONCE
Refills: 0 | Status: COMPLETED | OUTPATIENT
Start: 2023-11-16 | End: 2023-11-16

## 2023-11-16 RX ORDER — LANOLIN ALCOHOL/MO/W.PET/CERES
1 CREAM (GRAM) TOPICAL
Qty: 0 | Refills: 0 | DISCHARGE
Start: 2023-11-16

## 2023-11-16 RX ADMIN — Medication 100 MILLIGRAM(S): at 12:24

## 2023-11-16 RX ADMIN — SODIUM CHLORIDE 125 MILLILITER(S): 9 INJECTION, SOLUTION INTRAVENOUS at 12:23

## 2023-11-16 RX ADMIN — OXYCODONE HYDROCHLORIDE 5 MILLIGRAM(S): 5 TABLET ORAL at 05:30

## 2023-11-16 RX ADMIN — Medication 1 TABLET(S): at 12:24

## 2023-11-16 RX ADMIN — OXYCODONE HYDROCHLORIDE 5 MILLIGRAM(S): 5 TABLET ORAL at 04:30

## 2023-11-16 RX ADMIN — Medication 1 PACKET(S): at 13:39

## 2023-11-16 RX ADMIN — PANTOPRAZOLE SODIUM 40 MILLIGRAM(S): 20 TABLET, DELAYED RELEASE ORAL at 05:20

## 2023-11-16 RX ADMIN — SODIUM CHLORIDE 125 MILLILITER(S): 9 INJECTION, SOLUTION INTRAVENOUS at 05:19

## 2023-11-16 RX ADMIN — Medication 1 MILLIGRAM(S): at 12:24

## 2023-11-16 NOTE — BH CONSULTATION LIAISON PROGRESS NOTE - NSBHCONSULTRECOMMENDOTHER_PSY_A_CORE FT
11/15/23: HIDA normal, lactate down to 36  - on sxs triggered CIWA  - recommending nurse take a picture of reported "coffee ground emesis" and send to providers. Patient had reported coffee ground emesis before on prior admissions which turned out to be food particles and not actual blood   - once medically stable and cleared, discharge home...Pt immediately resumes drinking hence low risk for any withdrawal once discharged   11/16/23: Patient did not need subsequent PRNs for alcohol withdrawal since last seen; does not manifest any clinical/physical signs of alcohol withdrawal and denies subjective sxs  - does not need RX for home Librium or Ativan at this time   - declined referrals to alcohol abuse services   - [facility] psychiatry signing off

## 2023-11-16 NOTE — BH CONSULTATION LIAISON PROGRESS NOTE - NSBHCHARTREVIEWLAB_PSY_A_CORE FT
11-16    138  |  106  |  9   ----------------------------<  96  3.6   |  26  |  0.85    Ca    8.6      16 Nov 2023 07:45  Phos  1.9     11-16  Mg     1.8     11-16    TPro  6.7  /  Alb  3.1<L>  /  TBili  1.0  /  DBili  x   /  AST  48<H>  /  ALT  32  /  AlkPhos  48  11-16

## 2023-11-16 NOTE — DIETITIAN INITIAL EVALUATION ADULT - PERTINENT LABORATORY DATA
Walk in
11-16    138  |  106  |  9   ----------------------------<  96  3.6   |  26  |  0.85    Ca    8.6      16 Nov 2023 07:45  Phos  1.9     11-16  Mg     1.8     11-16    TPro  6.7  /  Alb  3.1<L>  /  TBili  1.0  /  DBili  x   /  AST  48<H>  /  ALT  32  /  AlkPhos  48  11-16  POCT Blood Glucose.: 94 mg/dL (11-16-23)  A1C Result: 5.3 % (07-18-23)

## 2023-11-16 NOTE — DISCHARGE NOTE PROVIDER - NSDCCPCAREPLAN_GEN_ALL_CORE_FT
PRINCIPAL DISCHARGE DIAGNOSIS  Diagnosis: Nausea & vomiting  Assessment and Plan of Treatment: You were found to have excessive nausea and vomiting due to alcohol use. Please take your medications as prescribed, and follow up with your primary care doctor.      SECONDARY DISCHARGE DIAGNOSES  Diagnosis: Gallbladder sludge  Assessment and Plan of Treatment: Please follow up with your primary care doctror.    Diagnosis: Alcohol dependence with withdrawal  Assessment and Plan of Treatment: Multiple studies suggest that drinking alcohol can affect your risk of developing certain health conditions.  Cardiovascular disease — Cardiovascular disease, including disorders of the heart, blood vessels, and blood circulation, is the leading cause of death in the [place].   High blood pressure — People who consume more than two drinks per day have up to a twofold increase in the incidence of high blood pressure compared with nondrinkers.  Atrial fibrillation — Having several drinks at a single occasion, even for people who usually only drink in moderation, can cause abnormal heart rhythms, including atrial fibrillation (the most common chronic heart rhythm disturbance). Cutting back on drinking has been proven to reduce episodes of atrial fibrillation among moderate to heavy drinkers.   Stroke — Alcohol has been shown to affect the risk of stroke in contradictory ways, depending upon the amount of alcohol consumed and the type of stroke. A stroke occurs when brain tissue dies as a result of a sudden, severe disruption of blood flow and insufficient oxygen.     PRINCIPAL DISCHARGE DIAGNOSIS  Diagnosis: Nausea & vomiting  Assessment and Plan of Treatment: You were found to have excessive nausea and vomiting due to alcohol use. Please return to the emergency room if symptoms get worse.  Please stop consuming alcohol.  Please take your medications as prescribed, and follow up with your primary care doctor.      SECONDARY DISCHARGE DIAGNOSES  Diagnosis: Gallbladder sludge  Assessment and Plan of Treatment: Please follow up with your primary care doctror.    Diagnosis: Alcohol dependence with withdrawal  Assessment and Plan of Treatment: Multiple studies suggest that drinking alcohol can affect your risk of developing certain health conditions.  Cardiovascular disease — Cardiovascular disease, including disorders of the heart, blood vessels, and blood circulation, is the leading cause of death in the [place].   High blood pressure — People who consume more than two drinks per day have up to a twofold increase in the incidence of high blood pressure compared with nondrinkers.  Atrial fibrillation — Having several drinks at a single occasion, even for people who usually only drink in moderation, can cause abnormal heart rhythms, including atrial fibrillation (the most common chronic heart rhythm disturbance). Cutting back on drinking has been proven to reduce episodes of atrial fibrillation among moderate to heavy drinkers.   Stroke — Alcohol has been shown to affect the risk of stroke in contradictory ways, depending upon the amount of alcohol consumed and the type of stroke. A stroke occurs when brain tissue dies as a result of a sudden, severe disruption of blood flow and insufficient oxygen.

## 2023-11-16 NOTE — BH CONSULTATION LIAISON PROGRESS NOTE - CURRENT MEDICATION
MEDICATIONS  (STANDING):  dextrose 50% Injectable 25 Gram(s) IV Push once  dextrose 50% Injectable 12.5 Gram(s) IV Push once  dextrose 50% Injectable 25 Gram(s) IV Push once  dextrose Oral Gel 15 Gram(s) Oral once  folic acid 1 milliGRAM(s) Oral daily  glucagon  Injectable 1 milliGRAM(s) IntraMuscular once  lactated ringers. 1000 milliLiter(s) (125 mL/Hr) IV Continuous <Continuous>  multivitamin 1 Tablet(s) Oral daily  pantoprazole  Injectable 40 milliGRAM(s) IV Push two times a day  sucralfate 1 Gram(s) Oral two times a day  thiamine 100 milliGRAM(s) Oral daily    MEDICATIONS  (PRN):  acetaminophen     Tablet .. 650 milliGRAM(s) Oral every 6 hours PRN Mild Pain (1 - 3), Moderate Pain (4 - 6)  aluminum hydroxide/magnesium hydroxide/simethicone Suspension 30 milliLiter(s) Oral every 4 hours PRN Dyspepsia  chlordiazePOXIDE 50 milliGRAM(s) Oral every 4 hours PRN CIWA score 9-12  LORazepam   Injectable 2 milliGRAM(s) IV Push every 2 hours PRN CIWA score 13 or higher  melatonin 3 milliGRAM(s) Oral at bedtime PRN Insomnia  ondansetron Injectable 4 milliGRAM(s) IV Push every 6 hours PRN Nausea and/or Vomiting  oxyCODONE    IR 5 milliGRAM(s) Oral every 8 hours PRN Severe Pain (7 - 10)

## 2023-11-16 NOTE — BH CONSULTATION LIAISON PROGRESS NOTE - NSBHFUPINTERVALHXFT_PSY_A_CORE
No significant interval events. Patient did not need subsequent PRNs for alcohol withdrawal since last seen. Patient seen at bedside. Calm, cooperative, pleasant,m recognizes Writer from prior admissions. Patient does not manifest any clinical/physical signs of alcohol withdrawal and denies subjective sxs as well. He says he feels "much better" since admission and feels ready to go home. 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 He now goes and helps out friends/family as they need. Patient's wife is going to [place] to visit their daughter; Pt went last time but cannot go this time. Otherwise, patient has dewayne functioning at baseline. Endorses stable euthymic mood, regular sleep / appetite / energy level / concentration. Denies and does not manifest any symptoms of hypomania/alma/psychosis/major depression/ anxiety/panic. Denies any active or passive suicidal or homicidal ideation. Names protective factors (uma; family; hope for future). Denies illicit substance use, denies access to guns.

## 2023-11-16 NOTE — DISCHARGE NOTE PROVIDER - NSDCMRMEDTOKEN_GEN_ALL_CORE_FT
folic acid 1 mg oral tablet: 1 tab(s) orally once a day  melatonin 3 mg oral tablet: 1 tab(s) orally once a day (at bedtime) As needed Insomnia  Multiple Vitamins oral tablet: 1 tab(s) orally once a day  thiamine 100 mg oral tablet: 1 tab(s) orally once a day  Tylenol 325 mg oral tablet: 2 tab(s) orally every 6 hours as needed for  mild pain

## 2023-11-16 NOTE — DIETITIAN INITIAL EVALUATION ADULT - OTHER INFO
Pt c many admissions for long-term ETOH use.  Pt lives c son; mostly independent c ADLs. No diet restrictions followed at home; wt has fluctuated over year or so but basically stable.  No reports of any N/V/C/D or chew/swallowing issues.

## 2023-11-16 NOTE — BH CONSULTATION LIAISON PROGRESS NOTE - NSBHMSEINTELL_PSY_A_CORE
{\rtf1\ygmyws92712\ansi\guxgkgc3139\ftnbj\uc1\deff0  {\fonttbl{\f0 \fnil Segoe UI;}{\f1 \fnil \fcharset0 Segoe UI;}{\f2 \fnil Times New Jeremy;}}  {\colortbl ;\bcm566\nbwzh676\mzsv471 ;\red0\green0\blue0 ;\red0\green0\clqb686 ;\red0\green0\blue0 ;}  {\stylesheet{\f0\fs20 Normal;}{\cs1 Default Paragraph Font;}{\cs2\f0\fs16 Line Number;}{\cs3\f2\fs24\ul\cf3 Hyperlink;}}  {\*\revtbl{Unknown;}}  \pclxlg04647\gumffy48815\iolup6883\fyrzd8201\rrbvp8442\jxejg1646\ovwjhmx850\nolcfsx485\nogrowautofit\okumep915\formshade\nofeaturethrottle1\dntblnsbdb\fet4\aendnotes\aftnnrlc\pgbrdrhead\pgbrdrfoot  \sectd\fdizlg74628\gczfci50502\guttersxn0\aywbfykl8783\uxwgspwo0965\rsfwarhe9921\nculyywv5857\pnpyvmh886\diakewo589\[id number],94016867369,38814001761 }{\fldrslt Problem/Plan - 1:}}\plain\f0\fs20\zmtl1971\hich\f0\dbch\f0\loch\f0\fs20\ql\par  \'b7  {\*\bkmkstart ya69567203632}{\*\bkmkend xa95080023566}Problem: {\*\bkmkstart fn82921758031}{\*\bkmkend ol14086884171}Acute respiratory failure with hypoxia. \par  \'b7  {\*\bkmkstart pk33566105547}{\*\bkmkend vz42772041024}Plan: {\*\bkmkstart ge12377878488}{\*\bkmkend ge39811919698}[id number],94377619285,55189538060 }{\fldrslt Problem/Plan - 2:}}\plain\f0\fs20\xkoo8618\hich\f0\dbch\f0\loch\f0\fs20\ql\par  \'b7  {\*\bkmkstart ac84338107531}{\*\bkmkend py54068365110}Problem: {\*\bkmkstart in80110822065}{\*\bkmkend is99408522449}2019 novel coronavirus disease (COVID-19). \par  \'b7  {\*\bkmkstart ll82657836913}{\*\bkmkend ox45980767105}Plan: {\*\bkmkstart yx98507327699}{\*\bkmkend kb42401275745}[id number],65702913925,67312289845 }{\fldrslt Problem/Plan - 3:}}\plain\f0\fs20\vgst1333\hich\f0\dbch\f0\loch\f0\fs20\ql\par  \'b7  {\*\bkmkstart tp37062251854}{\*\bkmkend vh11634043074}Problem: {\*\bkmkstart iv29291091292}{\*\bkmkend nt88032712133}COPD (chronic obstructive pulmonary disease). \par  \'b7  {\*\bkmkstart tn93639083240}{\*\bkmkend or67565440645}Plan: {\*\bkmkstart ug25876034105}{\*\bkmkend mm18298462621}[id number],46826934000,58270359673 }{\fldrslt Problem/Plan - 4:}}\plain\f0\fs20\gqkr8036\hich\f0\dbch\f0\loch\f0\fs20\ql\par  \'b7  {\*\bkmkstart ei88857417373}{\*\bkmkend cm86401374993}Problem: {\*\bkmkstart at03603747063}{\*\bkmkend ex51202075531}Atrial fibrillation with RVR. \par  \'b7  {\*\bkmkstart dv83453373516}{\*\bkmkend qh68982713242}Plan: {\*\bkmkstart ms96835675158}{\*\bkmkend qw38011016856}731741348244463,36653890653,00684050558 }{\fldrslt Problem/Plan - 5:}}\plain\f0\fs20\jwcz5363\hich\f0\dbch\f0\loch\f0\fs20\ql\par  \'b7  {\*\bkmkstart ud76084507455}{\*\bkmkend sn95572791094}Problem: {\*\bkmkstart wr85082883359}{\*\bkmkend sf59375429554}Hypertension. \par  \'b7  {\*\bkmkstart vm57326050828}{\*\bkmkend be22427368193}Plan: {\*\bkmkstart qz53481390903}{\*\bkmkend mq97644855931}Home med: lisinopril 20mg qd\par  - f/u bps and restart as tolerated.\par  \par  \plain\f1\fs20\tpyl2089\hich\f1\dbch\f1\loch\f1\cf2\fs20\ul{\field{\*\fldinst HYPERLINK [id number],72543387492,77278437155 }{\fldrslt Problem/Plan - 6:}}\plain\f0\fs20\ivfi2745\hich\f0\dbch\f0\loch\f0\fs20\ql\par  \'b7  {\*\bkmkstart jl34187471524}{\*\bkmkend sy17124682725}Problem: {\*\bkmkstart mh30616567381}{\*\bkmkend ss83901179939}Microcytic anemia. \par  \'b7  {\*\bkmkstart xn84721495339}{\*\bkmkend uv85493202706}Plan: {\*\bkmkstart hc85339110441}{\*\bkmkend ix83910902908}409446526824085,65344405706,34942582797 }{\fldrslt Problem/Plan - 7:}}\plain\f0\fs20\osql7937\hich\f0\dbch\f0\loch\f0\fs20\ql\par  \'b7  {\*\bkmkstart rs39280396231}{\*\bkmkend dq35738736027}Problem: {\*\bkmkstart uv62196584409}{\*\bkmkend kv60692955733}Lactic acidosis. \par  \'b7  {\*\bkmkstart jx29872170434}{\*\bkmkend xi13398503987}Plan: {\*\bkmkstart mq49059292709}{\*\bkmkend th11782588270}298524445521474,78516722510,68108254939 }{\fldrslt Problem/Plan - 8:}}\plain\f0\fs20\zhja3231\hich\f0\dbch\f0\loch\f0\fs20\ql\par  \'b7  {\*\bkmkstart nb57902864049}{\*\bkmkend nv05367591333}Problem: {\*\bkmkstart ix03186207168}{\*\bkmkend qz99467284198}Anxiety.\plain\f1\fs20\jhrr1303\hich\f1\dbch\f1\loch\f1\cf2\fs20\strike\plain\f0\fs20\kvja4603\hich\f0\dbch\f0\loch\f0\fs20\par  \'b7  {\*\bkmkstart ea19331953539}{\*\bkmkend ks19299969163}Plan: {\*\bkmkstart au80545892604}{\*\bkmkend ne52544115785}[id number],33718028492,33428825084 }{\fldrslt Problem/Plan - 9:}}\plain\f0\fs20\deea5935\hich\f0\dbch\f0\loch\f0\fs20\ql\par  \'b7  {\*\bkmkstart ob43652874791}{\*\bkmkend uw32766200037}Problem: {\*\bkmkstart vh75407824733}{\*\bkmkend ej00820211006}Preventive measure. \par  \'b7  {\*\bkmkstart yn10657896248}{\*\bkmkend wx76644418628}Plan: {\*\bkmkstart pd85194306220}{\*\bkmkend vd78788889656}
Average

## 2023-11-16 NOTE — DISCHARGE NOTE NURSING/CASE MANAGEMENT/SOCIAL WORK - PATIENT PORTAL LINK FT
You can access the FollowMyHealth Patient Portal offered by [facility] by registering at the following website: [email or web address]. By joining [employer]s FollowMyHealth portal, you will also be able to view your health information using other applications (apps) compatible with our system.

## 2023-11-16 NOTE — DISCHARGE NOTE PROVIDER - HOSPITAL COURSE
This is a 58 years old male with h/o alcohol dependence with h/o alcohol withdrawal required ICU admission p/w complain of nausea, vomiting and abdominal pain. Patient reported multiple episode of vomiting with occasional streak of blood/black material for last few days, associated with abdominal pain. Reported drinking 1-2 bottle of vodka daily and last drink was 2 days ago ( but initial serum alcohol elevated). While in ED, patient developed alcohol withdrawal. RVP negative. CT abd/pelvis noted hyperdense material within the gallbladder, which may represent sludge. Otherwise no acute pathology. RUQ ultrasound with No cholelithiasis or evidence of cholecystitis. Gibson sign was positive according to the technologist. Evaluated by psych. Hemodynamically stable ready for DC, afebrile, sat well at RA. No leukocygosis, Hb stable at 11 on 11/15 and 11/16. Symptoms improved, pt tolerating regular oral diet well.   Case discussed with Dr Singh on 11/16/23 who is in agreement with plan.       Alcohol withdrawal syndrome  Coffee ground emesis  Hypokalemia  Lactic acidosis   EtOH dependence   Gastritis   Mixed hyperlipidemia   PUD (peptic ulcer disease)      This is a 58 years old male with h/o alcohol dependence with h/o alcohol withdrawal required ICU admission p/w complain of nausea, vomiting and abdominal pain. Patient reported multiple episode of vomiting with occasional streak of blood/black material for last few days, associated with abdominal pain. Reported drinking 1-2 bottle of vodka daily and last drink was 2 days ago ( but initial serum alcohol elevated). While in ED, patient developed alcohol withdrawal. RVP negative. CT abd/pelvis noted hyperdense material within the gallbladder, which may represent sludge. Otherwise no acute pathology. RUQ ultrasound with No cholelithiasis or evidence of cholecystitis. Gibson sign was positive according to the technologist. Evaluated by psych. Hemodynamically stable ready for DC, afebrile, sat well at RA. No leukocygosis, Hb stable at 11 on 11/15 and 11/16. Symptoms improved, pt tolerating regular oral diet well.       Alcohol withdrawal syndrome  Coffee ground emesis  Hypokalemia  Lactic acidosis   EtOH dependence   Gastritis   Mixed hyperlipidemia   PUD (peptic ulcer disease)       - Cleared by medicine for discharge.  Case discussed with Dr Singh on 11/16/23 who is in agreement with plan.

## 2023-11-16 NOTE — DIETITIAN INITIAL EVALUATION ADULT - PERTINENT MEDS FT
lactated ringers @ 125 ml/hr, Maalox, Librium, folic acid, Ativan, Melatonin, MVI, Zofran, Oxycodone IR, Protonix, thiamine

## 2023-11-20 DIAGNOSIS — R10.84 GENERALIZED ABDOMINAL PAIN: ICD-10-CM

## 2023-11-20 DIAGNOSIS — F10.239 ALCOHOL DEPENDENCE WITH WITHDRAWAL, UNSPECIFIED: ICD-10-CM

## 2023-11-20 DIAGNOSIS — F10.939 ALCOHOL USE, UNSPECIFIED WITH WITHDRAWAL, UNSPECIFIED: ICD-10-CM

## 2023-11-20 DIAGNOSIS — K29.60 OTHER GASTRITIS WITHOUT BLEEDING: ICD-10-CM

## 2023-11-20 DIAGNOSIS — E87.6 HYPOKALEMIA: ICD-10-CM

## 2023-11-20 DIAGNOSIS — K27.9 PEPTIC ULCER, SITE UNSPECIFIED, UNSPECIFIED AS ACUTE OR CHRONIC, WITHOUT HEMORRHAGE OR PERFORATION: ICD-10-CM

## 2023-11-20 DIAGNOSIS — K82.8 OTHER SPECIFIED DISEASES OF GALLBLADDER: ICD-10-CM

## 2023-11-20 DIAGNOSIS — E78.5 HYPERLIPIDEMIA, UNSPECIFIED: ICD-10-CM

## 2023-11-20 DIAGNOSIS — K92.0 HEMATEMESIS: ICD-10-CM

## 2023-11-20 DIAGNOSIS — R79.89 OTHER SPECIFIED ABNORMAL FINDINGS OF BLOOD CHEMISTRY: ICD-10-CM

## 2023-11-20 DIAGNOSIS — R41.82 ALTERED MENTAL STATUS, UNSPECIFIED: ICD-10-CM

## 2023-11-20 LAB
CULTURE RESULTS: SIGNIFICANT CHANGE UP
SPECIMEN SOURCE: SIGNIFICANT CHANGE UP

## 2023-12-16 NOTE — PATIENT PROFILE ADULT - ABILITY TO HEAR (WITH HEARING AID OR HEARING APPLIANCE IF NORMALLY USED):
The patient's goals for the shift include PATIENT WILL REMAIN SAFE FROM FALLS AND INJURIES DURING THIS SHIFT    The clinical goals for the shift include PT.WILL BE SEEN BY PCP/CONSULTS,RECIEVE PRESCRIBED MEDS/TX'S.    Goals met, safety maintained.   Adequate: hears normal conversation without difficulty

## 2023-12-17 ENCOUNTER — INPATIENT (INPATIENT)
Facility: HOSPITAL | Age: 58
LOS: 1 days | Discharge: LEFT BEFORE DISCHARGE | End: 2023-12-19
Attending: STUDENT IN AN ORGANIZED HEALTH CARE EDUCATION/TRAINING PROGRAM | Admitting: STUDENT IN AN ORGANIZED HEALTH CARE EDUCATION/TRAINING PROGRAM
Payer: MEDICAID

## 2023-12-17 VITALS
HEART RATE: 112 BPM | TEMPERATURE: 98 F | HEIGHT: 66 IN | OXYGEN SATURATION: 96 % | RESPIRATION RATE: 16 BRPM | WEIGHT: 169.98 LBS | DIASTOLIC BLOOD PRESSURE: 94 MMHG | SYSTOLIC BLOOD PRESSURE: 134 MMHG

## 2023-12-17 LAB
ALBUMIN SERPL ELPH-MCNC: 3.6 G/DL — SIGNIFICANT CHANGE UP (ref 3.3–5)
ALBUMIN SERPL ELPH-MCNC: 3.6 G/DL — SIGNIFICANT CHANGE UP (ref 3.3–5)
ALP SERPL-CCNC: 61 U/L — SIGNIFICANT CHANGE UP (ref 40–120)
ALP SERPL-CCNC: 61 U/L — SIGNIFICANT CHANGE UP (ref 40–120)
ALT FLD-CCNC: 32 U/L — SIGNIFICANT CHANGE UP (ref 12–78)
ALT FLD-CCNC: 32 U/L — SIGNIFICANT CHANGE UP (ref 12–78)
ANION GAP SERPL CALC-SCNC: 9 MMOL/L — SIGNIFICANT CHANGE UP (ref 5–17)
ANION GAP SERPL CALC-SCNC: 9 MMOL/L — SIGNIFICANT CHANGE UP (ref 5–17)
AST SERPL-CCNC: 35 U/L — SIGNIFICANT CHANGE UP (ref 15–37)
AST SERPL-CCNC: 35 U/L — SIGNIFICANT CHANGE UP (ref 15–37)
BASE EXCESS BLDV CALC-SCNC: -1.8 MMOL/L — SIGNIFICANT CHANGE UP (ref -2–3)
BASE EXCESS BLDV CALC-SCNC: -1.8 MMOL/L — SIGNIFICANT CHANGE UP (ref -2–3)
BASOPHILS # BLD AUTO: 0.04 K/UL — SIGNIFICANT CHANGE UP (ref 0–0.2)
BASOPHILS # BLD AUTO: 0.04 K/UL — SIGNIFICANT CHANGE UP (ref 0–0.2)
BASOPHILS NFR BLD AUTO: 1.6 % — SIGNIFICANT CHANGE UP (ref 0–2)
BASOPHILS NFR BLD AUTO: 1.6 % — SIGNIFICANT CHANGE UP (ref 0–2)
BILIRUB SERPL-MCNC: 0.5 MG/DL — SIGNIFICANT CHANGE UP (ref 0.2–1.2)
BILIRUB SERPL-MCNC: 0.5 MG/DL — SIGNIFICANT CHANGE UP (ref 0.2–1.2)
BLOOD GAS COMMENTS, VENOUS: SIGNIFICANT CHANGE UP
BLOOD GAS COMMENTS, VENOUS: SIGNIFICANT CHANGE UP
BUN SERPL-MCNC: 11 MG/DL — SIGNIFICANT CHANGE UP (ref 7–23)
BUN SERPL-MCNC: 11 MG/DL — SIGNIFICANT CHANGE UP (ref 7–23)
CALCIUM SERPL-MCNC: 8.7 MG/DL — SIGNIFICANT CHANGE UP (ref 8.5–10.1)
CALCIUM SERPL-MCNC: 8.7 MG/DL — SIGNIFICANT CHANGE UP (ref 8.5–10.1)
CHLORIDE BLDV-SCNC: 111 MMOL/L — HIGH (ref 98–107)
CHLORIDE BLDV-SCNC: 111 MMOL/L — HIGH (ref 98–107)
CHLORIDE SERPL-SCNC: 109 MMOL/L — HIGH (ref 96–108)
CHLORIDE SERPL-SCNC: 109 MMOL/L — HIGH (ref 96–108)
CO2 BLDV-SCNC: 26 MMOL/L — SIGNIFICANT CHANGE UP (ref 22–26)
CO2 BLDV-SCNC: 26 MMOL/L — SIGNIFICANT CHANGE UP (ref 22–26)
CO2 SERPL-SCNC: 25 MMOL/L — SIGNIFICANT CHANGE UP (ref 22–31)
CO2 SERPL-SCNC: 25 MMOL/L — SIGNIFICANT CHANGE UP (ref 22–31)
CREAT SERPL-MCNC: 1.03 MG/DL — SIGNIFICANT CHANGE UP (ref 0.5–1.3)
CREAT SERPL-MCNC: 1.03 MG/DL — SIGNIFICANT CHANGE UP (ref 0.5–1.3)
EGFR: 85 ML/MIN/1.73M2 — SIGNIFICANT CHANGE UP
EGFR: 85 ML/MIN/1.73M2 — SIGNIFICANT CHANGE UP
EOSINOPHIL # BLD AUTO: 0.05 K/UL — SIGNIFICANT CHANGE UP (ref 0–0.5)
EOSINOPHIL # BLD AUTO: 0.05 K/UL — SIGNIFICANT CHANGE UP (ref 0–0.5)
EOSINOPHIL NFR BLD AUTO: 2 % — SIGNIFICANT CHANGE UP (ref 0–6)
EOSINOPHIL NFR BLD AUTO: 2 % — SIGNIFICANT CHANGE UP (ref 0–6)
GAS PNL BLDV: 143 MMOL/L — SIGNIFICANT CHANGE UP (ref 136–145)
GAS PNL BLDV: 143 MMOL/L — SIGNIFICANT CHANGE UP (ref 136–145)
GAS PNL BLDV: SIGNIFICANT CHANGE UP
GLUCOSE BLDV-MCNC: 82 MG/DL — SIGNIFICANT CHANGE UP (ref 65–95)
GLUCOSE BLDV-MCNC: 82 MG/DL — SIGNIFICANT CHANGE UP (ref 65–95)
GLUCOSE SERPL-MCNC: 106 MG/DL — HIGH (ref 70–99)
GLUCOSE SERPL-MCNC: 106 MG/DL — HIGH (ref 70–99)
HCO3 BLDV-SCNC: 24 MMOL/L — SIGNIFICANT CHANGE UP (ref 22–28)
HCO3 BLDV-SCNC: 24 MMOL/L — SIGNIFICANT CHANGE UP (ref 22–28)
HCT VFR BLD CALC: 38.1 % — LOW (ref 39–50)
HCT VFR BLD CALC: 38.1 % — LOW (ref 39–50)
HCT VFR BLDA CALC: 33 % — LOW (ref 37–47)
HCT VFR BLDA CALC: 33 % — LOW (ref 37–47)
HGB BLD CALC-MCNC: 11 G/DL — LOW (ref 12.6–17.4)
HGB BLD CALC-MCNC: 11 G/DL — LOW (ref 12.6–17.4)
HGB BLD-MCNC: 11.9 G/DL — LOW (ref 13–17)
HGB BLD-MCNC: 11.9 G/DL — LOW (ref 13–17)
HOROWITZ INDEX BLDV+IHG-RTO: SIGNIFICANT CHANGE UP
HOROWITZ INDEX BLDV+IHG-RTO: SIGNIFICANT CHANGE UP
IMM GRANULOCYTES NFR BLD AUTO: 0.4 % — SIGNIFICANT CHANGE UP (ref 0–0.9)
IMM GRANULOCYTES NFR BLD AUTO: 0.4 % — SIGNIFICANT CHANGE UP (ref 0–0.9)
LACTATE BLDV-MCNC: 4.4 MMOL/L — CRITICAL HIGH (ref 0.56–1.39)
LACTATE BLDV-MCNC: 4.4 MMOL/L — CRITICAL HIGH (ref 0.56–1.39)
LACTATE SERPL-SCNC: 3.8 MMOL/L — HIGH (ref 0.7–2)
LACTATE SERPL-SCNC: 4.5 MMOL/L — CRITICAL HIGH (ref 0.7–2)
LACTATE SERPL-SCNC: 4.5 MMOL/L — CRITICAL HIGH (ref 0.7–2)
LIDOCAIN IGE QN: 66 U/L — SIGNIFICANT CHANGE UP (ref 13–75)
LIDOCAIN IGE QN: 66 U/L — SIGNIFICANT CHANGE UP (ref 13–75)
LYMPHOCYTES # BLD AUTO: 1.42 K/UL — SIGNIFICANT CHANGE UP (ref 1–3.3)
LYMPHOCYTES # BLD AUTO: 1.42 K/UL — SIGNIFICANT CHANGE UP (ref 1–3.3)
LYMPHOCYTES # BLD AUTO: 55.7 % — HIGH (ref 13–44)
LYMPHOCYTES # BLD AUTO: 55.7 % — HIGH (ref 13–44)
MCHC RBC-ENTMCNC: 23.5 PG — LOW (ref 27–34)
MCHC RBC-ENTMCNC: 23.5 PG — LOW (ref 27–34)
MCHC RBC-ENTMCNC: 31.2 G/DL — LOW (ref 32–36)
MCHC RBC-ENTMCNC: 31.2 G/DL — LOW (ref 32–36)
MCV RBC AUTO: 75.1 FL — LOW (ref 80–100)
MCV RBC AUTO: 75.1 FL — LOW (ref 80–100)
MONOCYTES # BLD AUTO: 0.22 K/UL — SIGNIFICANT CHANGE UP (ref 0–0.9)
MONOCYTES # BLD AUTO: 0.22 K/UL — SIGNIFICANT CHANGE UP (ref 0–0.9)
MONOCYTES NFR BLD AUTO: 8.6 % — SIGNIFICANT CHANGE UP (ref 2–14)
MONOCYTES NFR BLD AUTO: 8.6 % — SIGNIFICANT CHANGE UP (ref 2–14)
NEUTROPHILS # BLD AUTO: 0.81 K/UL — LOW (ref 1.8–7.4)
NEUTROPHILS # BLD AUTO: 0.81 K/UL — LOW (ref 1.8–7.4)
NEUTROPHILS NFR BLD AUTO: 31.7 % — LOW (ref 43–77)
NEUTROPHILS NFR BLD AUTO: 31.7 % — LOW (ref 43–77)
NRBC # BLD: 0 /100 WBCS — SIGNIFICANT CHANGE UP (ref 0–0)
NRBC # BLD: 0 /100 WBCS — SIGNIFICANT CHANGE UP (ref 0–0)
OTHER CELLS CSF MANUAL: SIGNIFICANT CHANGE UP ML/DL (ref 18–22)
OTHER CELLS CSF MANUAL: SIGNIFICANT CHANGE UP ML/DL (ref 18–22)
PCO2 BLDV: 46 MMHG — SIGNIFICANT CHANGE UP (ref 42–55)
PCO2 BLDV: 46 MMHG — SIGNIFICANT CHANGE UP (ref 42–55)
PH BLDV: 7.33 — SIGNIFICANT CHANGE UP (ref 7.32–7.43)
PH BLDV: 7.33 — SIGNIFICANT CHANGE UP (ref 7.32–7.43)
PLATELET # BLD AUTO: 284 K/UL — SIGNIFICANT CHANGE UP (ref 150–400)
PLATELET # BLD AUTO: 284 K/UL — SIGNIFICANT CHANGE UP (ref 150–400)
PO2 BLDV: 33 MMHG — SIGNIFICANT CHANGE UP (ref 25–45)
PO2 BLDV: 33 MMHG — SIGNIFICANT CHANGE UP (ref 25–45)
POTASSIUM BLDV-SCNC: 4.6 MMOL/L — SIGNIFICANT CHANGE UP (ref 3.5–5.1)
POTASSIUM BLDV-SCNC: 4.6 MMOL/L — SIGNIFICANT CHANGE UP (ref 3.5–5.1)
POTASSIUM SERPL-MCNC: 4 MMOL/L — SIGNIFICANT CHANGE UP (ref 3.5–5.3)
POTASSIUM SERPL-MCNC: 4 MMOL/L — SIGNIFICANT CHANGE UP (ref 3.5–5.3)
POTASSIUM SERPL-SCNC: 4 MMOL/L — SIGNIFICANT CHANGE UP (ref 3.5–5.3)
POTASSIUM SERPL-SCNC: 4 MMOL/L — SIGNIFICANT CHANGE UP (ref 3.5–5.3)
PROT SERPL-MCNC: 8.1 GM/DL — SIGNIFICANT CHANGE UP (ref 6–8.3)
PROT SERPL-MCNC: 8.1 GM/DL — SIGNIFICANT CHANGE UP (ref 6–8.3)
RBC # BLD: 5.07 M/UL — SIGNIFICANT CHANGE UP (ref 4.2–5.8)
RBC # BLD: 5.07 M/UL — SIGNIFICANT CHANGE UP (ref 4.2–5.8)
RBC # FLD: 21.6 % — HIGH (ref 10.3–14.5)
RBC # FLD: 21.6 % — HIGH (ref 10.3–14.5)
SAO2 % BLDV: 41.1 % — LOW (ref 94–98)
SAO2 % BLDV: 41.1 % — LOW (ref 94–98)
SODIUM SERPL-SCNC: 143 MMOL/L — SIGNIFICANT CHANGE UP (ref 135–145)
SODIUM SERPL-SCNC: 143 MMOL/L — SIGNIFICANT CHANGE UP (ref 135–145)
TROPONIN I, HIGH SENSITIVITY RESULT: 8 NG/L — SIGNIFICANT CHANGE UP
TROPONIN I, HIGH SENSITIVITY RESULT: 8 NG/L — SIGNIFICANT CHANGE UP
WBC # BLD: 2.55 K/UL — LOW (ref 3.8–10.5)
WBC # BLD: 2.55 K/UL — LOW (ref 3.8–10.5)
WBC # FLD AUTO: 2.55 K/UL — LOW (ref 3.8–10.5)
WBC # FLD AUTO: 2.55 K/UL — LOW (ref 3.8–10.5)

## 2023-12-17 PROCEDURE — 99285 EMERGENCY DEPT VISIT HI MDM: CPT | Mod: 25

## 2023-12-17 PROCEDURE — 93010 ELECTROCARDIOGRAM REPORT: CPT

## 2023-12-17 PROCEDURE — 36000 PLACE NEEDLE IN VEIN: CPT

## 2023-12-17 RX ORDER — FAMOTIDINE 10 MG/ML
20 INJECTION INTRAVENOUS ONCE
Refills: 0 | Status: COMPLETED | OUTPATIENT
Start: 2023-12-17 | End: 2023-12-17

## 2023-12-17 RX ORDER — ONDANSETRON 8 MG/1
4 TABLET, FILM COATED ORAL ONCE
Refills: 0 | Status: COMPLETED | OUTPATIENT
Start: 2023-12-17 | End: 2023-12-17

## 2023-12-17 RX ORDER — SODIUM CHLORIDE 9 MG/ML
1000 INJECTION INTRAMUSCULAR; INTRAVENOUS; SUBCUTANEOUS ONCE
Refills: 0 | Status: COMPLETED | OUTPATIENT
Start: 2023-12-17 | End: 2023-12-17

## 2023-12-17 RX ORDER — PANTOPRAZOLE SODIUM 20 MG/1
40 TABLET, DELAYED RELEASE ORAL ONCE
Refills: 0 | Status: COMPLETED | OUTPATIENT
Start: 2023-12-17 | End: 2023-12-17

## 2023-12-17 RX ORDER — SODIUM CHLORIDE 9 MG/ML
2000 INJECTION INTRAMUSCULAR; INTRAVENOUS; SUBCUTANEOUS ONCE
Refills: 0 | Status: COMPLETED | OUTPATIENT
Start: 2023-12-17 | End: 2023-12-17

## 2023-12-17 RX ORDER — KETOROLAC TROMETHAMINE 30 MG/ML
15 SYRINGE (ML) INJECTION ONCE
Refills: 0 | Status: DISCONTINUED | OUTPATIENT
Start: 2023-12-17 | End: 2023-12-17

## 2023-12-17 RX ORDER — DIPHENHYDRAMINE HYDROCHLORIDE AND LIDOCAINE HYDROCHLORIDE AND ALUMINUM HYDROXIDE AND MAGNESIUM HYDRO
10 KIT ONCE
Refills: 0 | Status: COMPLETED | OUTPATIENT
Start: 2023-12-17 | End: 2023-12-17

## 2023-12-17 RX ADMIN — SODIUM CHLORIDE 1000 MILLILITER(S): 9 INJECTION INTRAMUSCULAR; INTRAVENOUS; SUBCUTANEOUS at 17:27

## 2023-12-17 RX ADMIN — DIPHENHYDRAMINE HYDROCHLORIDE AND LIDOCAINE HYDROCHLORIDE AND ALUMINUM HYDROXIDE AND MAGNESIUM HYDRO 10 MILLILITER(S): KIT at 17:22

## 2023-12-17 RX ADMIN — SODIUM CHLORIDE 1000 MILLILITER(S): 9 INJECTION INTRAMUSCULAR; INTRAVENOUS; SUBCUTANEOUS at 20:08

## 2023-12-17 RX ADMIN — Medication 2 MILLIGRAM(S): at 20:09

## 2023-12-17 RX ADMIN — SODIUM CHLORIDE 2000 MILLILITER(S): 9 INJECTION INTRAMUSCULAR; INTRAVENOUS; SUBCUTANEOUS at 22:27

## 2023-12-17 RX ADMIN — Medication 2 MILLIGRAM(S): at 22:34

## 2023-12-17 RX ADMIN — FAMOTIDINE 20 MILLIGRAM(S): 10 INJECTION INTRAVENOUS at 17:23

## 2023-12-17 RX ADMIN — PANTOPRAZOLE SODIUM 40 MILLIGRAM(S): 20 TABLET, DELAYED RELEASE ORAL at 20:12

## 2023-12-17 RX ADMIN — ONDANSETRON 4 MILLIGRAM(S): 8 TABLET, FILM COATED ORAL at 17:23

## 2023-12-17 RX ADMIN — SODIUM CHLORIDE 1000 MILLILITER(S): 9 INJECTION INTRAMUSCULAR; INTRAVENOUS; SUBCUTANEOUS at 19:49

## 2023-12-17 RX ADMIN — SODIUM CHLORIDE 1000 MILLILITER(S): 9 INJECTION INTRAMUSCULAR; INTRAVENOUS; SUBCUTANEOUS at 21:07

## 2023-12-17 RX ADMIN — Medication 50 MILLIGRAM(S): at 17:21

## 2023-12-17 NOTE — ED PROVIDER NOTE - PHYSICAL EXAMINATION
General appearance: Nontoxic appearing, conversant, afebrile    Eyes: anicteric sclerae, NUBIA, EOMI   HENT: Atraumatic; oropharynx clear, MMM and no ulcerations, no pharyngeal erythema or exudate   Neck: Trachea midline; Full range of motion, supple   Pulm: CTA bl, normal respiratory effort and no intercostal retractions, normal work of breathing   CV: RRR, No murmurs, rubs, or gallops   Abdomen: Soft, slight epigastrium tenderness, non-distended; no guarding or rebound   Extremities: No peripheral edema, no gross deformities, FROM x4   Skin: Dry, normal temperature, turgor and texture; no rash   Psych: Appropriate affect, cooperative

## 2023-12-17 NOTE — ED ADULT TRIAGE NOTE - CHIEF COMPLAINT QUOTE
BIBA for abdominal pain with n/v/d started 4 days ago. States "I cannot eat, do not want to ask me anymore questions."

## 2023-12-17 NOTE — ED PROVIDER NOTE - NS ED ATTENDING STATEMENT MOD
SUBJECTIVE:                                                   Cande Shields is a 25 year old female who presents to clinic today for the following health issue(s):  Patient presents with:  Abnormal Bleeding Problem: pt had stillborn 2020. pt here to follow up on  periods, still have not returned to normal. Current period she is on now has been for 21 days so far,  period prior to this was 217 days with some form of bleeding every day.  Breast Exam: pt continues to have lactation. Had stopped for a small amount of time and then had nipples pierced and started again.  Vaginal Problem: was having yeast like symptoms a couple days ago, doesn't seem to have any symptoms today. And also states she gets UTIs often with no symptoms.      HPI:  Pt here today for evaluation of ongoing uterine bleeding.   She delivered a stillborn baby at 31 weeks gestation in 2020.  Her cycles have not regulated.  She has been trialed on Depo-Provera and progestin only pills.  She is bleeding today.  She is on Xarelto for a DVT and has not been taking it as directed.  She continues to take iron supplement.  She also complains of bilateral nipple discharge that is milky with breast tenderness.  She started having discharge in April of this year after piercing both of her nipples.  She took a pregnancy test at home may be a week ago and it was negative.  She is not on any hormonal cycle control.    She complains of feeling of dysuria as well as vaginal itching.  UA and wet prep are pending at this time.    Patient's last menstrual period was 2021..     Patient is sexually active, .  Using none for contraception.    reports that she has been smoking. She has been smoking about 0.25 packs per day. She has never used smokeless tobacco.  Tobacco Cessation Action Plan: Information offered: Patient not interested at this time  Self help information given to patient  pt has some patches at home that she thinks work  well for her, she plans to be quit by the time she becomes pregnant.  STD testing offered?  Declined    Health maintenance updated:  Yes, pap abstracted from [facility] 6/29/20 NIL. Will talk to Rheum about covid vac to see if she is in remission yet. Was told to wait till then.    Today's PHQ-2 Score:   PHQ-2 ( 1999 [employer]) 9/8/2021   Q1: Little interest or pleasure in doing things 1   Q2: Feeling down, depressed or hopeless 0   PHQ-2 Score 1     Today's PHQ-9 Score:   PHQ-9 SCORE 6/10/2021   PHQ-9 Total Score 8     Today's EB-7 Score:   EB-7 SCORE 6/10/2021   Total Score -   Total Score 6       Problem list and histories reviewed & adjusted, as indicated.  Additional history: as documented.    Patient Active Problem List   Diagnosis     Wegener's granulomatosis (H)     Myalgia     Granulomatosis with polyangiitis, unspecified whether renal involvement (H)     ANCA-associated vasculitis (H)     Morbid obesity (H)     Pulmonary infiltrates     Acute kidney injury (H)     Need for pneumocystis prophylaxis     Past Surgical History:   Procedure Laterality Date     ENT SURGERY  2000    cyst     EXCISE GANGLION WRIST  2009      Social History     Tobacco Use     Smoking status: Current Every Day Smoker     Packs/day: 0.25     Smokeless tobacco: Never Used     Tobacco comment: past vaping   Substance Use Topics     Alcohol use: Yes     Comment: Occas      Problem (# of Occurrences) Relation (Name,Age of Onset)    Cancer (1) Maternal Grandfather    Thyroid Disease (1) Mother       Negative family history of: Asthma, Diabetes            Current Outpatient Medications   Medication Sig     DULoxetine (CYMBALTA) 30 MG capsule Take 1 capsule (30 mg) by mouth At Bedtime     ferrous sulfate (FEROSUL) 325 (65 Fe) MG tablet Take 1 tablet (325 mg) by mouth daily (with breakfast)     predniSONE (DELTASONE) 5 MG tablet Take 5 mg by mouth daily     rivaroxaban ANTICOAGULANT (XARELTO) 20 MG TABS tablet Take 1 tablet (20 mg) by mouth  "daily (with dinner)     triamcinolone (KENALOG) 0.1 % external ointment Apply to rash on left arm twice daily until resolved.     No current facility-administered medications for this visit.     Allergies   Allergen Reactions     Heparin Other (See Comments)     Reaction: HIT     Penicillins Rash     Unknown, but think rash.  Tolerated cephalexin (12/27/20), cefpodoxime (12/27/20)       ROS:  12 point review of systems negative other than symptoms noted below or in the HPI.  Gastrointestinal: Nausea  Genitourinary: Irregular Menses  POSITIVE for:, dysuria, irregular menses, vaginal itching or burning      OBJECTIVE:     /72   Pulse 76   Ht 1.626 m (5' 4\")   Wt 120.2 kg (265 lb)   LMP 08/19/2021   BMI 45.49 kg/m    Body mass index is 45.49 kg/m .    Exam:  Constitutional:  Appearance: Well nourished, well developed alert, in no acute distress  Psychiatric:  Mentation appears normal and affect normal/bright.  Pelvic Exam:  External Genitalia:     Normal appearance for age, no discharge present, no tenderness present, no inflammatory lesions present, color normal  Vagina:     Normal vaginal vault without central or paravaginal defects, no discharge present, no inflammatory lesions present, no masses present blood in vault  Urethra:   Urethral Meatus:  No erythema or lesions present  Cervix:     Appearance healthy, no lesions present, nontender to palpation, dark brown bleeding present  Perineum:     Perineum within normal limits, no evidence of trauma, no rashes or skin lesions present  Anus:     Anus within normal limits, no hemorrhoids present  Inguinal Lymph Nodes:     No lymphadenopathy present  Pubic Hair:     Normal pubic hair distribution for age  Genitalia and Groin:     No rashes present, no lesions present, no areas of discoloration, no masses present       In-Clinic Test Results:  Results for orders placed or performed in visit on 09/08/21 (from the past 24 hour(s))   UA without Microscopic - lab " collect   Result Value Ref Range    Color Urine Yellow Colorless, Straw, Light Yellow, Yellow    Appearance Urine Clear Clear    Glucose Urine Negative Negative mg/dL    Bilirubin Urine Negative Negative    Ketones Urine Negative Negative mg/dL    Specific Gravity Urine >=1.030 1.003 - 1.035    Blood Urine Moderate (A) Negative    pH Urine 5.5 5.0 - 7.0    Protein Albumin Urine 100  (A) Negative mg/dL    Urobilinogen Urine 0.2 0.2, 1.0 E.U./dL    Nitrite Urine Negative Negative    Leukocyte Esterase Urine Negative Negative       ASSESSMENT/PLAN:                                                        ICD-10-CM    1. Vaginal discharge  N89.8 Wet preparation   2. Dysuria  R30.0 UA without Microscopic - lab collect     UA without Microscopic - lab collect   3. Abnormal uterine bleeding (AUB)  N93.9 HCG quantitative pregnancy     INR     CBC with platelets     HCG quantitative pregnancy     INR     CBC with platelets   4. Nipple discharge  N64.52 Prolactin     Prolactin       Patient Instructions   [employer] is for any [facility] looking for free support to quit smoking, vaping or chewing.   [employer] will offer many quit support options and resources so [place] residents can continue to find the way to quit that works best for them.   Free support includes personalized coaching, email and text support, educational materials, and quit medication (nicotine patches, gum or lozenges) delivered by mail.     Contact [employer] at [email or web address] or online at [employer] to receive support on your quit journey.       UA today is negative for any signs of infection.  We have encouraged her to contact hematology and see what her plan is for going off of Xarelto.  PT/INR and other labs will be drawn today.  We gave the patient options for resuming Depo-Provera versus progestin only tablets and she will think that this      LILIA Ravi CNP  M [facility] FOR [facility] [place]   Attending Only

## 2023-12-17 NOTE — ED PROVIDER NOTE - NSFOLLOWUPINSTRUCTIONS_ED_ALL_ED_FT
Rest, drink plenty of fluids  Advance activity as tolerated  Continue all previously prescribed medications as directed  Follow up with your PMD - bring copies of your results  Return to the ER for severe pain, worsening symptoms, or other new or concerning symptoms   For pain, you may take famotidine 20mg every 12 hours as needed  You may additionally take maalox 20ml every 6 hours as needed

## 2023-12-17 NOTE — ED PROVIDER NOTE - CLINICAL SUMMARY MEDICAL DECISION MAKING FREE TEXT BOX
59yo male well known to this ed with pmh etoh abuse, htn, hld, presenting with abdominal pain.  Same as typical pain which he has been seen here for before.  + Nausea and vomiting earlier.  No cp, sob, back pain.  Pain in epigastric region.  Slight ttp.  Nontoxic appearing and seems c/w prior presentations.  Does not seem intoxicated or in withdrawal at this time.  Will give libirum and meds for likely gastritis/ pud type pain and obtain screening labs.  Considered imaging but multiple visits for similar prior, will hold now. 57yo male well known to this ed with pmh etoh abuse, htn, hld, presenting with abdominal pain.  Same as typical pain which he has been seen here for before.  + Nausea and vomiting earlier.  No cp, sob, back pain.  Pain in epigastric region.  Slight ttp.  Nontoxic appearing and seems c/w prior presentations.  Does not seem intoxicated or in withdrawal at this time.  Will give libirum and meds for likely gastritis/ pud type pain and obtain screening labs.  Considered imaging but multiple visits for similar prior, will hold now.

## 2023-12-17 NOTE — ED ADULT NURSE NOTE - OBJECTIVE STATEMENT
received er bed h9 c/o generalized body pain over past several days pt known etoh dependence ciwa=5 states last drank yesterday

## 2023-12-17 NOTE — ED PROVIDER NOTE - PROGRESS NOTE DETAILS
Symptomatically improved, asking for dc to call son.  Not intoxicated or withdrawing clinically at this time.  Tolerating po.  Will dc. PAULA COUCH: -year-old male with PMH HLD, peptic ulcer disease, alcohol abuse, multiple hospitalizations in the past presents with epigastric pain, nausea multiple episodes of nonbloody nonbilious vomiting x today. nontender abdomen x 2.  tolerating po. Lactate elevated, abdominal pain improved and comfortable at this time.  Low concern for acute intraabdominal process, no pain out of proportion.  Will hydrate and repeat.  CIWA still low but medicated when started to get agitated given patient's significant history of withdrawals Symptomatically improved, asking for dc to call son.  Not intoxicated or withdrawing clinically at this time. admit for increased lactate since arrival s/p 3L fluids, patient endorsing shakiness. will admit for possible early etoh withdrawal. -Liao

## 2023-12-17 NOTE — ED ADULT NURSE NOTE - NSFALLHARMRISKINTERV_ED_ALL_ED
Assistance OOB with selected safe patient handling equipment if applicable/Assistance with ambulation/Communicate risk of Fall with Harm to all staff, patient, and family/Monitor gait and stability/Provide visual cue: red socks, yellow wristband, yellow gown, etc/Reinforce activity limits and safety measures with patient and family/Bed in lowest position, wheels locked, appropriate side rails in place/Call bell, personal items and telephone in reach/Instruct patient to call for assistance before getting out of bed/chair/stretcher/Non-slip footwear applied when patient is off stretcher/[place] to call system/Physically safe environment - no spills, clutter or unnecessary equipment/Purposeful Proactive Rounding/Room/bathroom lighting operational, light cord in reach Assistance OOB with selected safe patient handling equipment if applicable/Assistance with ambulation/Communicate risk of Fall with Harm to all staff, patient, and family/Monitor gait and stability/Provide visual cue: red socks, yellow wristband, yellow gown, etc/Reinforce activity limits and safety measures with patient and family/Bed in lowest position, wheels locked, appropriate side rails in place/Call bell, personal items and telephone in reach/Instruct patient to call for assistance before getting out of bed/chair/stretcher/Non-slip footwear applied when patient is off stretcher/[place] to call system/Physically safe environment - no spills, clutter or unnecessary equipment/Purposeful Proactive Rounding/Room/bathroom lighting operational, light cord in reach

## 2023-12-18 DIAGNOSIS — E78.2 MIXED HYPERLIPIDEMIA: ICD-10-CM

## 2023-12-18 DIAGNOSIS — K29.70 GASTRITIS, UNSPECIFIED, WITHOUT BLEEDING: ICD-10-CM

## 2023-12-18 DIAGNOSIS — I10 ESSENTIAL (PRIMARY) HYPERTENSION: ICD-10-CM

## 2023-12-18 DIAGNOSIS — F10.239 ALCOHOL DEPENDENCE WITH WITHDRAWAL, UNSPECIFIED: ICD-10-CM

## 2023-12-18 LAB
ALBUMIN SERPL ELPH-MCNC: 3 G/DL — LOW (ref 3.3–5)
ALBUMIN SERPL ELPH-MCNC: 3 G/DL — LOW (ref 3.3–5)
ALP SERPL-CCNC: 50 U/L — SIGNIFICANT CHANGE UP (ref 40–120)
ALP SERPL-CCNC: 50 U/L — SIGNIFICANT CHANGE UP (ref 40–120)
ALT FLD-CCNC: 27 U/L — SIGNIFICANT CHANGE UP (ref 12–78)
ALT FLD-CCNC: 27 U/L — SIGNIFICANT CHANGE UP (ref 12–78)
ANION GAP SERPL CALC-SCNC: 6 MMOL/L — SIGNIFICANT CHANGE UP (ref 5–17)
ANION GAP SERPL CALC-SCNC: 6 MMOL/L — SIGNIFICANT CHANGE UP (ref 5–17)
AST SERPL-CCNC: 29 U/L — SIGNIFICANT CHANGE UP (ref 15–37)
AST SERPL-CCNC: 29 U/L — SIGNIFICANT CHANGE UP (ref 15–37)
BILIRUB DIRECT SERPL-MCNC: 0.2 MG/DL — SIGNIFICANT CHANGE UP (ref 0–0.3)
BILIRUB DIRECT SERPL-MCNC: 0.2 MG/DL — SIGNIFICANT CHANGE UP (ref 0–0.3)
BILIRUB INDIRECT FLD-MCNC: 0.4 MG/DL — SIGNIFICANT CHANGE UP (ref 0.2–1)
BILIRUB INDIRECT FLD-MCNC: 0.4 MG/DL — SIGNIFICANT CHANGE UP (ref 0.2–1)
BILIRUB SERPL-MCNC: 0.6 MG/DL — SIGNIFICANT CHANGE UP (ref 0.2–1.2)
BILIRUB SERPL-MCNC: 0.6 MG/DL — SIGNIFICANT CHANGE UP (ref 0.2–1.2)
BUN SERPL-MCNC: 9 MG/DL — SIGNIFICANT CHANGE UP (ref 7–23)
BUN SERPL-MCNC: 9 MG/DL — SIGNIFICANT CHANGE UP (ref 7–23)
CALCIUM SERPL-MCNC: 7.9 MG/DL — LOW (ref 8.5–10.1)
CALCIUM SERPL-MCNC: 7.9 MG/DL — LOW (ref 8.5–10.1)
CHLORIDE SERPL-SCNC: 115 MMOL/L — HIGH (ref 96–108)
CHLORIDE SERPL-SCNC: 115 MMOL/L — HIGH (ref 96–108)
CO2 SERPL-SCNC: 22 MMOL/L — SIGNIFICANT CHANGE UP (ref 22–31)
CO2 SERPL-SCNC: 22 MMOL/L — SIGNIFICANT CHANGE UP (ref 22–31)
CREAT SERPL-MCNC: 0.95 MG/DL — SIGNIFICANT CHANGE UP (ref 0.5–1.3)
CREAT SERPL-MCNC: 0.95 MG/DL — SIGNIFICANT CHANGE UP (ref 0.5–1.3)
EGFR: 94 ML/MIN/1.73M2 — SIGNIFICANT CHANGE UP
EGFR: 94 ML/MIN/1.73M2 — SIGNIFICANT CHANGE UP
GLUCOSE SERPL-MCNC: 80 MG/DL — SIGNIFICANT CHANGE UP (ref 70–99)
GLUCOSE SERPL-MCNC: 80 MG/DL — SIGNIFICANT CHANGE UP (ref 70–99)
LACTATE SERPL-SCNC: 4.4 MMOL/L — CRITICAL HIGH (ref 0.7–2)
LACTATE SERPL-SCNC: 4.4 MMOL/L — CRITICAL HIGH (ref 0.7–2)
MAGNESIUM SERPL-MCNC: 1.4 MG/DL — LOW (ref 1.6–2.6)
MAGNESIUM SERPL-MCNC: 1.4 MG/DL — LOW (ref 1.6–2.6)
PHOSPHATE SERPL-MCNC: 2 MG/DL — LOW (ref 2.5–4.5)
PHOSPHATE SERPL-MCNC: 2 MG/DL — LOW (ref 2.5–4.5)
POTASSIUM SERPL-MCNC: 3.9 MMOL/L — SIGNIFICANT CHANGE UP (ref 3.5–5.3)
POTASSIUM SERPL-MCNC: 3.9 MMOL/L — SIGNIFICANT CHANGE UP (ref 3.5–5.3)
POTASSIUM SERPL-SCNC: 3.9 MMOL/L — SIGNIFICANT CHANGE UP (ref 3.5–5.3)
POTASSIUM SERPL-SCNC: 3.9 MMOL/L — SIGNIFICANT CHANGE UP (ref 3.5–5.3)
PROT SERPL-MCNC: 6.6 GM/DL — SIGNIFICANT CHANGE UP (ref 6–8.3)
PROT SERPL-MCNC: 6.6 GM/DL — SIGNIFICANT CHANGE UP (ref 6–8.3)
SODIUM SERPL-SCNC: 143 MMOL/L — SIGNIFICANT CHANGE UP (ref 135–145)
SODIUM SERPL-SCNC: 143 MMOL/L — SIGNIFICANT CHANGE UP (ref 135–145)

## 2023-12-18 PROCEDURE — 99222 1ST HOSP IP/OBS MODERATE 55: CPT

## 2023-12-18 RX ORDER — FOLIC ACID 0.8 MG
1 TABLET ORAL DAILY
Refills: 0 | Status: DISCONTINUED | OUTPATIENT
Start: 2023-12-18 | End: 2023-12-19

## 2023-12-18 RX ORDER — THIAMINE MONONITRATE (VIT B1) 100 MG
100 TABLET ORAL DAILY
Refills: 0 | Status: DISCONTINUED | OUTPATIENT
Start: 2023-12-18 | End: 2023-12-19

## 2023-12-18 RX ORDER — KETOROLAC TROMETHAMINE 30 MG/ML
15 SYRINGE (ML) INJECTION ONCE
Refills: 0 | Status: DISCONTINUED | OUTPATIENT
Start: 2023-12-18 | End: 2023-12-18

## 2023-12-18 RX ORDER — ONDANSETRON 8 MG/1
8 TABLET, FILM COATED ORAL
Refills: 0 | Status: DISCONTINUED | OUTPATIENT
Start: 2023-12-18 | End: 2023-12-19

## 2023-12-18 RX ORDER — MAGNESIUM SULFATE 500 MG/ML
2 VIAL (ML) INJECTION ONCE
Refills: 0 | Status: COMPLETED | OUTPATIENT
Start: 2023-12-18 | End: 2023-12-18

## 2023-12-18 RX ORDER — SODIUM CHLORIDE 9 MG/ML
1000 INJECTION INTRAMUSCULAR; INTRAVENOUS; SUBCUTANEOUS
Refills: 0 | Status: DISCONTINUED | OUTPATIENT
Start: 2023-12-18 | End: 2023-12-19

## 2023-12-18 RX ORDER — ATORVASTATIN CALCIUM 80 MG/1
20 TABLET, FILM COATED ORAL AT BEDTIME
Refills: 0 | Status: DISCONTINUED | OUTPATIENT
Start: 2023-12-18 | End: 2023-12-19

## 2023-12-18 RX ORDER — PANTOPRAZOLE SODIUM 20 MG/1
40 TABLET, DELAYED RELEASE ORAL
Refills: 0 | Status: DISCONTINUED | OUTPATIENT
Start: 2023-12-18 | End: 2023-12-19

## 2023-12-18 RX ADMIN — PANTOPRAZOLE SODIUM 40 MILLIGRAM(S): 20 TABLET, DELAYED RELEASE ORAL at 09:14

## 2023-12-18 RX ADMIN — Medication 15 MILLIGRAM(S): at 10:14

## 2023-12-18 RX ADMIN — Medication 50 MILLIGRAM(S): at 01:26

## 2023-12-18 RX ADMIN — Medication 100 MILLIGRAM(S): at 12:09

## 2023-12-18 RX ADMIN — Medication 15 MILLIGRAM(S): at 09:14

## 2023-12-18 RX ADMIN — ONDANSETRON 8 MILLIGRAM(S): 8 TABLET, FILM COATED ORAL at 05:25

## 2023-12-18 RX ADMIN — Medication 50 MILLIGRAM(S): at 12:10

## 2023-12-18 RX ADMIN — Medication 2 MILLIGRAM(S): at 10:09

## 2023-12-18 RX ADMIN — Medication 1 MILLIGRAM(S): at 12:10

## 2023-12-18 RX ADMIN — SODIUM CHLORIDE 125 MILLILITER(S): 9 INJECTION INTRAMUSCULAR; INTRAVENOUS; SUBCUTANEOUS at 01:27

## 2023-12-18 RX ADMIN — Medication 15 MILLIGRAM(S): at 00:07

## 2023-12-18 RX ADMIN — Medication 50 MILLIGRAM(S): at 05:24

## 2023-12-18 RX ADMIN — Medication 1 TABLET(S): at 12:10

## 2023-12-18 RX ADMIN — ONDANSETRON 8 MILLIGRAM(S): 8 TABLET, FILM COATED ORAL at 17:25

## 2023-12-18 RX ADMIN — Medication 2 MILLIGRAM(S): at 17:21

## 2023-12-18 RX ADMIN — SODIUM CHLORIDE 125 MILLILITER(S): 9 INJECTION INTRAMUSCULAR; INTRAVENOUS; SUBCUTANEOUS at 11:19

## 2023-12-18 RX ADMIN — Medication 2 MILLIGRAM(S): at 13:10

## 2023-12-18 RX ADMIN — SODIUM CHLORIDE 125 MILLILITER(S): 9 INJECTION INTRAMUSCULAR; INTRAVENOUS; SUBCUTANEOUS at 02:16

## 2023-12-18 RX ADMIN — Medication 25 GRAM(S): at 15:55

## 2023-12-18 RX ADMIN — Medication 2 MILLIGRAM(S): at 01:27

## 2023-12-18 NOTE — H&P ADULT - NSHPREVIEWOFSYSTEMS_GEN_ALL_CORE
REVIEW OF SYSTEMS:    CONSTITUTIONAL: No weakness, fevers or chills, anxious   EYES/ENT: No visual changes;  No vertigo or throat pain   NECK: No pain or stiffness  RESPIRATORY: No cough, wheezing, hemoptysis; No shortness of breath  CARDIOVASCULAR: No chest pain or palpitations  GASTROINTESTINAL: +abdominal or epigastric pain. + nausea, vomiting, -hematemesis; No diarrhea or constipation. No melena or hematochezia.  GENITOURINARY: No dysuria, frequency or hematuria  NEUROLOGICAL: No numbness or weakness  SKIN: No itching, rashes

## 2023-12-18 NOTE — CHART NOTE - NSCHARTNOTEFT_GEN_A_CORE
Patient seen and examined, chart reviewed. Patient sleeping, sedated as a result of CIWA. Will continue to monitor.

## 2023-12-18 NOTE — H&P ADULT - ASSESSMENT
59yo male well known to this ed with pmh etoh abuse, htn, hld, presenting with abdominal pain.  Same as typical pain which he has been seen here for before.  Durin his stay noted elevated LA and pt with tremors and worsening symptoms of withdrawal. Pt admitted for etoh withdrawal .

## 2023-12-18 NOTE — H&P ADULT - PROBLEM SELECTOR PLAN 1
librium taper  librium /ativan for symptom based.   Floic acid  MVI  Thiamine  IVF  elevated LA likely 2/2 ETOH.

## 2023-12-18 NOTE — H&P ADULT - NSHPPHYSICALEXAM_GEN_ALL_CORE
VITALS:   T(C): 36.8 (12-18-23 @ 05:26), Max: 36.8 (12-17-23 @ 18:33)  HR: 76 (12-18-23 @ 05:26) (64 - 112)  BP: 132/87 (12-18-23 @ 05:26) (126/90 - 146/84)  RR: 13 (12-18-23 @ 05:26) (13 - 18)  SpO2: 97% (12-18-23 @ 05:26) (96% - 100%)    GENERAL: NAD, lying in bed anxious complaining of abdominal pain. tremors noted  HEAD:  Atraumatic, Normocephalic  EYES: EOMI, PERRLA, conjunctiva and sclera clear  ENT: Moist mucous membranes  NECK: Supple, No JVD  CHEST/LUNG: Clear to auscultation bilaterally; No rales, rhonchi, wheezing, or rubs. Unlabored respirations  HEART: Regular rate and rhythm; No murmurs, rubs, or gallops  ABDOMEN: BSx4; Soft, nontender, nondistended  EXTREMITIES:  2+ Peripheral Pulses, brisk capillary refill. No clubbing, cyanosis, or edema  NERVOUS SYSTEM:  A&Ox3, no focal deficits +tremors and anxiety   SKIN: No rashes or lesions

## 2023-12-18 NOTE — H&P ADULT - NSHPLABSRESULTS_GEN_ALL_CORE
=======================================================  Labs:                        11.9   2.55  )-----------( 284      ( 17 Dec 2023 17:00 )             38.1     12-17    143  |  109<H>  |  11  ----------------------------<  106<H>  4.0   |  25  |  1.03    Ca    8.7      17 Dec 2023 17:00    TPro  8.1  /  Alb  3.6  /  TBili  0.5  /  DBili  x   /  AST  35  /  ALT  32  /  AlkPhos  61  12-17  Creatinine: 1.03 mg/dL (12-17-23 @ 17:00)  WBC Count: 2.55 K/uL (12-17-23 @ 17:00)  Alkaline Phosphatase: 61 U/L (12-17-23 @ 17:00)  Alanine Aminotransferase (ALT/SGPT): 32 U/L (12-17-23 @ 17:00)  Aspartate Aminotransferase (AST/SGOT): 35 U/L (12-17-23 @ 17:00)  Bilirubin Total: 0.5 mg/dL (12-17-23 @ 17:00)

## 2023-12-18 NOTE — H&P ADULT - HISTORY OF PRESENT ILLNESS
57yo male well known to this ed with pmh etoh abuse, htn, hld, presenting with abdominal pain.  Same as typical pain which he has been seen here for before.  + Nausea and vomiting earlier.  No cp, sob, back pain.  Pain in epigastric region.  Slight ttp.  Nontoxic appearing and seems c/w prior presentations.  did not seem intoxicated or in withdrawal at that time.     Durin his stay noted elevated LA and pt with tremors and worsening symptoms of withdrawal. Pt admitted for such

## 2023-12-19 ENCOUNTER — TRANSCRIPTION ENCOUNTER (OUTPATIENT)
Age: 58
End: 2023-12-19

## 2023-12-19 VITALS
RESPIRATION RATE: 18 BRPM | OXYGEN SATURATION: 97 % | HEART RATE: 76 BPM | DIASTOLIC BLOOD PRESSURE: 88 MMHG | TEMPERATURE: 98 F | SYSTOLIC BLOOD PRESSURE: 150 MMHG

## 2023-12-19 LAB
ALBUMIN SERPL ELPH-MCNC: 2.9 G/DL — LOW (ref 3.3–5)
ALBUMIN SERPL ELPH-MCNC: 2.9 G/DL — LOW (ref 3.3–5)
ALP SERPL-CCNC: 52 U/L — SIGNIFICANT CHANGE UP (ref 40–120)
ALP SERPL-CCNC: 52 U/L — SIGNIFICANT CHANGE UP (ref 40–120)
ALT FLD-CCNC: 27 U/L — SIGNIFICANT CHANGE UP (ref 12–78)
ALT FLD-CCNC: 27 U/L — SIGNIFICANT CHANGE UP (ref 12–78)
ANION GAP SERPL CALC-SCNC: 5 MMOL/L — SIGNIFICANT CHANGE UP (ref 5–17)
ANION GAP SERPL CALC-SCNC: 5 MMOL/L — SIGNIFICANT CHANGE UP (ref 5–17)
AST SERPL-CCNC: 30 U/L — SIGNIFICANT CHANGE UP (ref 15–37)
AST SERPL-CCNC: 30 U/L — SIGNIFICANT CHANGE UP (ref 15–37)
BILIRUB SERPL-MCNC: 0.7 MG/DL — SIGNIFICANT CHANGE UP (ref 0.2–1.2)
BILIRUB SERPL-MCNC: 0.7 MG/DL — SIGNIFICANT CHANGE UP (ref 0.2–1.2)
BUN SERPL-MCNC: 8 MG/DL — SIGNIFICANT CHANGE UP (ref 7–23)
BUN SERPL-MCNC: 8 MG/DL — SIGNIFICANT CHANGE UP (ref 7–23)
CALCIUM SERPL-MCNC: 8.1 MG/DL — LOW (ref 8.5–10.1)
CALCIUM SERPL-MCNC: 8.1 MG/DL — LOW (ref 8.5–10.1)
CHLORIDE SERPL-SCNC: 110 MMOL/L — HIGH (ref 96–108)
CHLORIDE SERPL-SCNC: 110 MMOL/L — HIGH (ref 96–108)
CO2 SERPL-SCNC: 24 MMOL/L — SIGNIFICANT CHANGE UP (ref 22–31)
CO2 SERPL-SCNC: 24 MMOL/L — SIGNIFICANT CHANGE UP (ref 22–31)
CREAT SERPL-MCNC: 0.88 MG/DL — SIGNIFICANT CHANGE UP (ref 0.5–1.3)
CREAT SERPL-MCNC: 0.88 MG/DL — SIGNIFICANT CHANGE UP (ref 0.5–1.3)
EGFR: 101 ML/MIN/1.73M2 — SIGNIFICANT CHANGE UP
EGFR: 101 ML/MIN/1.73M2 — SIGNIFICANT CHANGE UP
GLUCOSE SERPL-MCNC: 87 MG/DL — SIGNIFICANT CHANGE UP (ref 70–99)
GLUCOSE SERPL-MCNC: 87 MG/DL — SIGNIFICANT CHANGE UP (ref 70–99)
HCT VFR BLD CALC: 34 % — LOW (ref 39–50)
HCT VFR BLD CALC: 34 % — LOW (ref 39–50)
HGB BLD-MCNC: 10.7 G/DL — LOW (ref 13–17)
HGB BLD-MCNC: 10.7 G/DL — LOW (ref 13–17)
MAGNESIUM SERPL-MCNC: 2.2 MG/DL — SIGNIFICANT CHANGE UP (ref 1.6–2.6)
MAGNESIUM SERPL-MCNC: 2.2 MG/DL — SIGNIFICANT CHANGE UP (ref 1.6–2.6)
MCHC RBC-ENTMCNC: 24.1 PG — LOW (ref 27–34)
MCHC RBC-ENTMCNC: 24.1 PG — LOW (ref 27–34)
MCHC RBC-ENTMCNC: 31.5 G/DL — LOW (ref 32–36)
MCHC RBC-ENTMCNC: 31.5 G/DL — LOW (ref 32–36)
MCV RBC AUTO: 76.6 FL — LOW (ref 80–100)
MCV RBC AUTO: 76.6 FL — LOW (ref 80–100)
NRBC # BLD: 0 /100 WBCS — SIGNIFICANT CHANGE UP (ref 0–0)
NRBC # BLD: 0 /100 WBCS — SIGNIFICANT CHANGE UP (ref 0–0)
PHOSPHATE SERPL-MCNC: 2.3 MG/DL — LOW (ref 2.5–4.5)
PHOSPHATE SERPL-MCNC: 2.3 MG/DL — LOW (ref 2.5–4.5)
PLATELET # BLD AUTO: 209 K/UL — SIGNIFICANT CHANGE UP (ref 150–400)
PLATELET # BLD AUTO: 209 K/UL — SIGNIFICANT CHANGE UP (ref 150–400)
POTASSIUM SERPL-MCNC: 3.4 MMOL/L — LOW (ref 3.5–5.3)
POTASSIUM SERPL-MCNC: 3.4 MMOL/L — LOW (ref 3.5–5.3)
POTASSIUM SERPL-SCNC: 3.4 MMOL/L — LOW (ref 3.5–5.3)
POTASSIUM SERPL-SCNC: 3.4 MMOL/L — LOW (ref 3.5–5.3)
PROT SERPL-MCNC: 6.5 GM/DL — SIGNIFICANT CHANGE UP (ref 6–8.3)
PROT SERPL-MCNC: 6.5 GM/DL — SIGNIFICANT CHANGE UP (ref 6–8.3)
RBC # BLD: 4.44 M/UL — SIGNIFICANT CHANGE UP (ref 4.2–5.8)
RBC # BLD: 4.44 M/UL — SIGNIFICANT CHANGE UP (ref 4.2–5.8)
RBC # FLD: 20.9 % — HIGH (ref 10.3–14.5)
RBC # FLD: 20.9 % — HIGH (ref 10.3–14.5)
SODIUM SERPL-SCNC: 139 MMOL/L — SIGNIFICANT CHANGE UP (ref 135–145)
SODIUM SERPL-SCNC: 139 MMOL/L — SIGNIFICANT CHANGE UP (ref 135–145)
WBC # BLD: 3.8 K/UL — SIGNIFICANT CHANGE UP (ref 3.8–10.5)
WBC # BLD: 3.8 K/UL — SIGNIFICANT CHANGE UP (ref 3.8–10.5)
WBC # FLD AUTO: 3.8 K/UL — SIGNIFICANT CHANGE UP (ref 3.8–10.5)
WBC # FLD AUTO: 3.8 K/UL — SIGNIFICANT CHANGE UP (ref 3.8–10.5)

## 2023-12-19 PROCEDURE — 99239 HOSP IP/OBS DSCHRG MGMT >30: CPT

## 2023-12-19 RX ORDER — ATORVASTATIN CALCIUM 80 MG/1
1 TABLET, FILM COATED ORAL
Refills: 0
Start: 2023-12-19

## 2023-12-19 RX ORDER — POTASSIUM CHLORIDE 20 MEQ
40 PACKET (EA) ORAL ONCE
Refills: 0 | Status: DISCONTINUED | OUTPATIENT
Start: 2023-12-19 | End: 2023-12-19

## 2023-12-19 RX ORDER — ATORVASTATIN CALCIUM 80 MG/1
1 TABLET, FILM COATED ORAL
Qty: 30 | Refills: 0 | DISCHARGE
Start: 2023-12-19 | End: 2024-01-17

## 2023-12-19 RX ORDER — FAMOTIDINE 10 MG/ML
1 INJECTION INTRAVENOUS
Qty: 14 | Refills: 0
Start: 2023-12-19 | End: 2023-12-25

## 2023-12-19 RX ORDER — PANTOPRAZOLE SODIUM 20 MG/1
1 TABLET, DELAYED RELEASE ORAL
Qty: 30 | Refills: 0
Start: 2023-12-19 | End: 2024-01-17

## 2023-12-19 RX ORDER — FOLIC ACID 0.8 MG
1 TABLET ORAL
Qty: 7 | Refills: 0
Start: 2023-12-19 | End: 2023-12-25

## 2023-12-19 RX ORDER — PANTOPRAZOLE SODIUM 20 MG/1
1 TABLET, DELAYED RELEASE ORAL
Qty: 30 | Refills: 0 | DISCHARGE
Start: 2023-12-19 | End: 2024-01-17

## 2023-12-19 RX ORDER — SUCRALFATE 1 G
10 TABLET ORAL
Qty: 1200 | Refills: 0
Start: 2023-12-19 | End: 2024-01-17

## 2023-12-19 RX ORDER — ATORVASTATIN CALCIUM 80 MG/1
1 TABLET, FILM COATED ORAL
Qty: 30 | Refills: 0
Start: 2023-12-19 | End: 2024-01-17

## 2023-12-19 RX ADMIN — PANTOPRAZOLE SODIUM 40 MILLIGRAM(S): 20 TABLET, DELAYED RELEASE ORAL at 05:26

## 2023-12-19 RX ADMIN — Medication 50 MILLIGRAM(S): at 05:25

## 2023-12-19 NOTE — DISCHARGE NOTE PROVIDER - NSDCCPCAREPLAN_GEN_ALL_CORE_FT
PRINCIPAL DISCHARGE DIAGNOSIS  Diagnosis: Alcohol dependence with withdrawal  Assessment and Plan of Treatment: It is important that you cut down your drinking. You have had multiple admissions for alcohol withdrawal. However cutting alcohol cold turkey is very dangerous. Please seek further care with AA or another counselor that can help. If any signs of withdrawals please come back. You are signing out against our advice.

## 2023-12-19 NOTE — DISCHARGE NOTE PROVIDER - NSDCMRMEDTOKEN_GEN_ALL_CORE_FT
aluminum hydroxide-magnesium hydroxide 200 mg-200 mg/5 mL oral suspension: 30 milliliter(s) orally every 4 hours, As needed, Dyspepsia  famotidine 20 mg oral tablet: 1 tab(s) orally 2 times a day, as needed for acid reflux  folic acid 1 mg oral tablet: 1 tab(s) orally once a day  melatonin 3 mg oral tablet: 1 tab(s) orally once a day (at bedtime) As needed Insomnia  Multiple Vitamins oral tablet: 1 tab(s) orally once a day  pantoprazole 40 mg oral delayed release tablet: 1 tab(s) orally once a day (before a meal)  thiamine 100 mg oral tablet: 1 tab(s) orally once a day  Tylenol 325 mg oral tablet: 2 tab(s) orally every 6 hours as needed for  mild pain

## 2023-12-19 NOTE — CHART NOTE - NSCHARTNOTEFT_GEN_A_CORE
Medicine Hospitalist PA    Called by RN that patient would like to leave AMA. Patient was seen at the bedside to discuss the risks of leaving against medical advice, with many attempts made to reason to remain in the hospital to be safely discharged. Patient is A&Ox4  and has full capacity. Patient would still like to leave AMA and was made aware of the consequences of leaving AMA, including worsening of withdrawals and risk of death. Patient verbalized understanding and signed AMA form, witnessed by PRETTY Stanley at 10:43. Discussed with Dr. Alexis , who is in agreement. Medication refills sent to patients pharmacy, and discussed signs of worsening withdrawal/DT to be aware of to necessitate return to hospital.

## 2023-12-19 NOTE — DISCHARGE NOTE PROVIDER - HOSPITAL COURSE
7yo male well known to this ed with pmh etoh abuse, htn, hld, presenting with abdominal pain.  Same as typical pain which he has been seen here for before. Placed on a taper, however requests to sign out AMA. Risks/benefits explained, verbalizes understanding. 9yo male well known to this ed with pmh etoh abuse, htn, hld, presenting with abdominal pain.  Same as typical pain which he has been seen here for before. Placed on a taper, however requests to sign out AMA. Risks/benefits explained, verbalizes understanding.

## 2023-12-21 DIAGNOSIS — Z53.29 PROCEDURE AND TREATMENT NOT CARRIED OUT BECAUSE OF PATIENT'S DECISION FOR OTHER REASONS: ICD-10-CM

## 2023-12-21 DIAGNOSIS — R10.9 UNSPECIFIED ABDOMINAL PAIN: ICD-10-CM

## 2023-12-21 DIAGNOSIS — K29.70 GASTRITIS, UNSPECIFIED, WITHOUT BLEEDING: ICD-10-CM

## 2023-12-21 DIAGNOSIS — F10.230 ALCOHOL DEPENDENCE WITH WITHDRAWAL, UNCOMPLICATED: ICD-10-CM

## 2023-12-21 DIAGNOSIS — K27.9 PEPTIC ULCER, SITE UNSPECIFIED, UNSPECIFIED AS ACUTE OR CHRONIC, WITHOUT HEMORRHAGE OR PERFORATION: ICD-10-CM

## 2023-12-21 DIAGNOSIS — E78.2 MIXED HYPERLIPIDEMIA: ICD-10-CM

## 2023-12-21 DIAGNOSIS — E83.42 HYPOMAGNESEMIA: ICD-10-CM

## 2023-12-21 DIAGNOSIS — I10 ESSENTIAL (PRIMARY) HYPERTENSION: ICD-10-CM

## 2023-12-29 NOTE — BEHAVIORAL HEALTH ASSESSMENT NOTE - NSBHHPIREASON_PSY_A_CORE
Patient Specific Otc Recommendations (Will Not Stick From Patient To Patient): Allegra 180 mg- take one pill once daily x 3 more weeks for mild residual itch.  Pt has not been taking any PO antihistamine on a daily basis. Counseled pt to try and take this daily for about a month to see if her symptoms improve. \\nTopical Benadryl PRN for itch.  Pt states this has helped her a lot.\\nPt also to consider additional treatment options for anxiety.  She has been treated with medication for anxiety in the past but also talks about itching and her throat feeling swollen, and difficulty breathing at times.  Her parents recently went to [facility] and she is worried about them being there for 10 days.  She is currently home with her other siblings, but she will also be leaving soon to go to [place] herself for a year.  She is worried about the conditions with the current war going on there. Detail Level: Zone Consult

## 2023-12-30 ENCOUNTER — INPATIENT (INPATIENT)
Facility: HOSPITAL | Age: 58
LOS: 1 days | Discharge: LEFT BEFORE DISCHARGE | End: 2024-01-01
Attending: HOSPITALIST | Admitting: HOSPITALIST
Payer: MEDICAID

## 2023-12-30 VITALS
OXYGEN SATURATION: 97 % | RESPIRATION RATE: 20 BRPM | HEART RATE: 64 BPM | HEIGHT: 66 IN | SYSTOLIC BLOOD PRESSURE: 124 MMHG | WEIGHT: 166.89 LBS | TEMPERATURE: 98 F | DIASTOLIC BLOOD PRESSURE: 86 MMHG

## 2023-12-30 DIAGNOSIS — Z87.19 PERSONAL HISTORY OF OTHER DISEASES OF THE DIGESTIVE SYSTEM: ICD-10-CM

## 2023-12-30 DIAGNOSIS — F10.239 ALCOHOL DEPENDENCE WITH WITHDRAWAL, UNSPECIFIED: ICD-10-CM

## 2023-12-30 DIAGNOSIS — D72.819 DECREASED WHITE BLOOD CELL COUNT, UNSPECIFIED: ICD-10-CM

## 2023-12-30 DIAGNOSIS — R74.01 ELEVATION OF LEVELS OF LIVER TRANSAMINASE LEVELS: ICD-10-CM

## 2023-12-30 DIAGNOSIS — D50.9 IRON DEFICIENCY ANEMIA, UNSPECIFIED: ICD-10-CM

## 2023-12-30 LAB
ALBUMIN SERPL ELPH-MCNC: 3.3 G/DL — SIGNIFICANT CHANGE UP (ref 3.3–5)
ALBUMIN SERPL ELPH-MCNC: 3.3 G/DL — SIGNIFICANT CHANGE UP (ref 3.3–5)
ALP SERPL-CCNC: 65 U/L — SIGNIFICANT CHANGE UP (ref 40–120)
ALP SERPL-CCNC: 65 U/L — SIGNIFICANT CHANGE UP (ref 40–120)
ALT FLD-CCNC: 74 U/L — SIGNIFICANT CHANGE UP (ref 12–78)
ALT FLD-CCNC: 74 U/L — SIGNIFICANT CHANGE UP (ref 12–78)
ANION GAP SERPL CALC-SCNC: 6 MMOL/L — SIGNIFICANT CHANGE UP (ref 5–17)
ANION GAP SERPL CALC-SCNC: 6 MMOL/L — SIGNIFICANT CHANGE UP (ref 5–17)
AST SERPL-CCNC: 144 U/L — HIGH (ref 15–37)
AST SERPL-CCNC: 144 U/L — HIGH (ref 15–37)
BASOPHILS # BLD AUTO: 0.02 K/UL — SIGNIFICANT CHANGE UP (ref 0–0.2)
BASOPHILS # BLD AUTO: 0.02 K/UL — SIGNIFICANT CHANGE UP (ref 0–0.2)
BASOPHILS NFR BLD AUTO: 0.8 % — SIGNIFICANT CHANGE UP (ref 0–2)
BASOPHILS NFR BLD AUTO: 0.8 % — SIGNIFICANT CHANGE UP (ref 0–2)
BILIRUB SERPL-MCNC: 0.2 MG/DL — SIGNIFICANT CHANGE UP (ref 0.2–1.2)
BILIRUB SERPL-MCNC: 0.2 MG/DL — SIGNIFICANT CHANGE UP (ref 0.2–1.2)
BUN SERPL-MCNC: 8 MG/DL — SIGNIFICANT CHANGE UP (ref 7–23)
BUN SERPL-MCNC: 8 MG/DL — SIGNIFICANT CHANGE UP (ref 7–23)
CALCIUM SERPL-MCNC: 8.3 MG/DL — LOW (ref 8.5–10.1)
CALCIUM SERPL-MCNC: 8.3 MG/DL — LOW (ref 8.5–10.1)
CHLORIDE SERPL-SCNC: 115 MMOL/L — HIGH (ref 96–108)
CHLORIDE SERPL-SCNC: 115 MMOL/L — HIGH (ref 96–108)
CO2 SERPL-SCNC: 24 MMOL/L — SIGNIFICANT CHANGE UP (ref 22–31)
CO2 SERPL-SCNC: 24 MMOL/L — SIGNIFICANT CHANGE UP (ref 22–31)
CREAT SERPL-MCNC: 0.95 MG/DL — SIGNIFICANT CHANGE UP (ref 0.5–1.3)
CREAT SERPL-MCNC: 0.95 MG/DL — SIGNIFICANT CHANGE UP (ref 0.5–1.3)
EGFR: 94 ML/MIN/1.73M2 — SIGNIFICANT CHANGE UP
EGFR: 94 ML/MIN/1.73M2 — SIGNIFICANT CHANGE UP
EOSINOPHIL # BLD AUTO: 0.05 K/UL — SIGNIFICANT CHANGE UP (ref 0–0.5)
EOSINOPHIL # BLD AUTO: 0.05 K/UL — SIGNIFICANT CHANGE UP (ref 0–0.5)
EOSINOPHIL NFR BLD AUTO: 2.1 % — SIGNIFICANT CHANGE UP (ref 0–6)
EOSINOPHIL NFR BLD AUTO: 2.1 % — SIGNIFICANT CHANGE UP (ref 0–6)
FLUAV AG NPH QL: DETECTED
FLUAV AG NPH QL: DETECTED
FLUBV AG NPH QL: SIGNIFICANT CHANGE UP
FLUBV AG NPH QL: SIGNIFICANT CHANGE UP
GLUCOSE SERPL-MCNC: 80 MG/DL — SIGNIFICANT CHANGE UP (ref 70–99)
GLUCOSE SERPL-MCNC: 80 MG/DL — SIGNIFICANT CHANGE UP (ref 70–99)
HCT VFR BLD CALC: 39 % — SIGNIFICANT CHANGE UP (ref 39–50)
HCT VFR BLD CALC: 39 % — SIGNIFICANT CHANGE UP (ref 39–50)
HGB BLD-MCNC: 12.2 G/DL — LOW (ref 13–17)
HGB BLD-MCNC: 12.2 G/DL — LOW (ref 13–17)
IMM GRANULOCYTES NFR BLD AUTO: 0.4 % — SIGNIFICANT CHANGE UP (ref 0–0.9)
IMM GRANULOCYTES NFR BLD AUTO: 0.4 % — SIGNIFICANT CHANGE UP (ref 0–0.9)
LIDOCAIN IGE QN: 112 U/L — HIGH (ref 13–75)
LIDOCAIN IGE QN: 112 U/L — HIGH (ref 13–75)
LYMPHOCYTES # BLD AUTO: 1.61 K/UL — SIGNIFICANT CHANGE UP (ref 1–3.3)
LYMPHOCYTES # BLD AUTO: 1.61 K/UL — SIGNIFICANT CHANGE UP (ref 1–3.3)
LYMPHOCYTES # BLD AUTO: 67.1 % — HIGH (ref 13–44)
LYMPHOCYTES # BLD AUTO: 67.1 % — HIGH (ref 13–44)
MCHC RBC-ENTMCNC: 24.1 PG — LOW (ref 27–34)
MCHC RBC-ENTMCNC: 24.1 PG — LOW (ref 27–34)
MCHC RBC-ENTMCNC: 31.3 G/DL — LOW (ref 32–36)
MCHC RBC-ENTMCNC: 31.3 G/DL — LOW (ref 32–36)
MCV RBC AUTO: 76.9 FL — LOW (ref 80–100)
MCV RBC AUTO: 76.9 FL — LOW (ref 80–100)
MONOCYTES # BLD AUTO: 0.26 K/UL — SIGNIFICANT CHANGE UP (ref 0–0.9)
MONOCYTES # BLD AUTO: 0.26 K/UL — SIGNIFICANT CHANGE UP (ref 0–0.9)
MONOCYTES NFR BLD AUTO: 10.8 % — SIGNIFICANT CHANGE UP (ref 2–14)
MONOCYTES NFR BLD AUTO: 10.8 % — SIGNIFICANT CHANGE UP (ref 2–14)
NEUTROPHILS # BLD AUTO: 0.45 K/UL — LOW (ref 1.8–7.4)
NEUTROPHILS # BLD AUTO: 0.45 K/UL — LOW (ref 1.8–7.4)
NEUTROPHILS NFR BLD AUTO: 18.8 % — LOW (ref 43–77)
NEUTROPHILS NFR BLD AUTO: 18.8 % — LOW (ref 43–77)
NRBC # BLD: 0 /100 WBCS — SIGNIFICANT CHANGE UP (ref 0–0)
NRBC # BLD: 0 /100 WBCS — SIGNIFICANT CHANGE UP (ref 0–0)
PLATELET # BLD AUTO: 170 K/UL — SIGNIFICANT CHANGE UP (ref 150–400)
PLATELET # BLD AUTO: 170 K/UL — SIGNIFICANT CHANGE UP (ref 150–400)
POTASSIUM SERPL-MCNC: 4.2 MMOL/L — SIGNIFICANT CHANGE UP (ref 3.5–5.3)
POTASSIUM SERPL-MCNC: 4.2 MMOL/L — SIGNIFICANT CHANGE UP (ref 3.5–5.3)
POTASSIUM SERPL-SCNC: 4.2 MMOL/L — SIGNIFICANT CHANGE UP (ref 3.5–5.3)
POTASSIUM SERPL-SCNC: 4.2 MMOL/L — SIGNIFICANT CHANGE UP (ref 3.5–5.3)
PROT SERPL-MCNC: 7.7 GM/DL — SIGNIFICANT CHANGE UP (ref 6–8.3)
PROT SERPL-MCNC: 7.7 GM/DL — SIGNIFICANT CHANGE UP (ref 6–8.3)
RBC # BLD: 5.07 M/UL — SIGNIFICANT CHANGE UP (ref 4.2–5.8)
RBC # BLD: 5.07 M/UL — SIGNIFICANT CHANGE UP (ref 4.2–5.8)
RBC # FLD: 22.6 % — HIGH (ref 10.3–14.5)
RBC # FLD: 22.6 % — HIGH (ref 10.3–14.5)
SARS-COV-2 RNA SPEC QL NAA+PROBE: SIGNIFICANT CHANGE UP
SARS-COV-2 RNA SPEC QL NAA+PROBE: SIGNIFICANT CHANGE UP
SODIUM SERPL-SCNC: 145 MMOL/L — SIGNIFICANT CHANGE UP (ref 135–145)
SODIUM SERPL-SCNC: 145 MMOL/L — SIGNIFICANT CHANGE UP (ref 135–145)
WBC # BLD: 2.4 K/UL — LOW (ref 3.8–10.5)
WBC # BLD: 2.4 K/UL — LOW (ref 3.8–10.5)
WBC # FLD AUTO: 2.4 K/UL — LOW (ref 3.8–10.5)
WBC # FLD AUTO: 2.4 K/UL — LOW (ref 3.8–10.5)

## 2023-12-30 PROCEDURE — 93010 ELECTROCARDIOGRAM REPORT: CPT

## 2023-12-30 PROCEDURE — 99222 1ST HOSP IP/OBS MODERATE 55: CPT

## 2023-12-30 PROCEDURE — 71045 X-RAY EXAM CHEST 1 VIEW: CPT | Mod: 26

## 2023-12-30 PROCEDURE — 99285 EMERGENCY DEPT VISIT HI MDM: CPT

## 2023-12-30 RX ORDER — SODIUM CHLORIDE 9 MG/ML
1000 INJECTION INTRAMUSCULAR; INTRAVENOUS; SUBCUTANEOUS ONCE
Refills: 0 | Status: COMPLETED | OUTPATIENT
Start: 2023-12-30 | End: 2023-12-30

## 2023-12-30 RX ORDER — ONDANSETRON 8 MG/1
4 TABLET, FILM COATED ORAL EVERY 8 HOURS
Refills: 0 | Status: DISCONTINUED | OUTPATIENT
Start: 2023-12-30 | End: 2024-01-01

## 2023-12-30 RX ORDER — KETOROLAC TROMETHAMINE 30 MG/ML
30 SYRINGE (ML) INJECTION ONCE
Refills: 0 | Status: DISCONTINUED | OUTPATIENT
Start: 2023-12-30 | End: 2023-12-30

## 2023-12-30 RX ORDER — DIAZEPAM 5 MG
5 TABLET ORAL
Refills: 0 | Status: DISCONTINUED | OUTPATIENT
Start: 2023-12-30 | End: 2024-01-01

## 2023-12-30 RX ORDER — ACETAMINOPHEN 500 MG
650 TABLET ORAL EVERY 6 HOURS
Refills: 0 | Status: DISCONTINUED | OUTPATIENT
Start: 2023-12-30 | End: 2024-01-01

## 2023-12-30 RX ORDER — THIAMINE MONONITRATE (VIT B1) 100 MG
100 TABLET ORAL DAILY
Refills: 0 | Status: DISCONTINUED | OUTPATIENT
Start: 2023-12-30 | End: 2024-01-01

## 2023-12-30 RX ORDER — LANOLIN ALCOHOL/MO/W.PET/CERES
3 CREAM (GRAM) TOPICAL AT BEDTIME
Refills: 0 | Status: DISCONTINUED | OUTPATIENT
Start: 2023-12-30 | End: 2024-01-01

## 2023-12-30 RX ORDER — ATORVASTATIN CALCIUM 80 MG/1
20 TABLET, FILM COATED ORAL AT BEDTIME
Refills: 0 | Status: DISCONTINUED | OUTPATIENT
Start: 2023-12-30 | End: 2024-01-01

## 2023-12-30 RX ORDER — PANTOPRAZOLE SODIUM 20 MG/1
40 TABLET, DELAYED RELEASE ORAL
Refills: 0 | Status: DISCONTINUED | OUTPATIENT
Start: 2023-12-31 | End: 2024-01-01

## 2023-12-30 RX ORDER — FAMOTIDINE 10 MG/ML
20 INJECTION INTRAVENOUS
Refills: 0 | Status: DISCONTINUED | OUTPATIENT
Start: 2023-12-30 | End: 2024-01-01

## 2023-12-30 RX ORDER — FOLIC ACID 0.8 MG
1 TABLET ORAL DAILY
Refills: 0 | Status: DISCONTINUED | OUTPATIENT
Start: 2023-12-30 | End: 2024-01-01

## 2023-12-30 RX ADMIN — Medication 2 MILLIGRAM(S): at 20:53

## 2023-12-30 RX ADMIN — FAMOTIDINE 20 MILLIGRAM(S): 10 INJECTION INTRAVENOUS at 23:11

## 2023-12-30 RX ADMIN — ATORVASTATIN CALCIUM 20 MILLIGRAM(S): 80 TABLET, FILM COATED ORAL at 23:12

## 2023-12-30 RX ADMIN — Medication 30 MILLIGRAM(S): at 22:50

## 2023-12-30 RX ADMIN — Medication 100 MILLIGRAM(S): at 23:12

## 2023-12-30 RX ADMIN — Medication 1 MILLIGRAM(S): at 23:12

## 2023-12-30 RX ADMIN — Medication 1 TABLET(S): at 23:11

## 2023-12-30 RX ADMIN — SODIUM CHLORIDE 1000 MILLILITER(S): 9 INJECTION INTRAMUSCULAR; INTRAVENOUS; SUBCUTANEOUS at 20:53

## 2023-12-30 NOTE — ED ADULT NURSE NOTE - ED STAT RN HANDOFF DETAILS
report given to Tanya RN. Pt A&Ox4. pt VSS at this time. all pending orders endorsed to receiving RN

## 2023-12-30 NOTE — ED PROVIDER NOTE - CLINICAL SUMMARY MEDICAL DECISION MAKING FREE TEXT BOX
pt with alcohol withdrawal shakes noted - will medicate with ativan/fluids, check lipase etc- pt with alcohol withdrawal shakes noted - will medicate with ativan/fluids, check lipase etc- otherwise pt with CIWA 4-6 and some improvement noted after ativan - will admit for further care.

## 2023-12-30 NOTE — ED PROVIDER NOTE - NEUROLOGICAL, MLM
Alert and oriented, no focal deficits, no motor or sensory deficits. Alert and oriented, no focal deficits, no motor or sensory deficits. Some fine tremors noted

## 2023-12-30 NOTE — ED ADULT TRIAGE NOTE - CHIEF COMPLAINT QUOTE
Entire body hurts complaints of pain so loud the tenant called 911 FS 99 Entire body hurts complaints of pain so loud the tenant called 911 FS 99 denies alcohol intake

## 2023-12-30 NOTE — H&P ADULT - ASSESSMENT
Stefan Degroot is a 58 year old male with PMHx of EtOH abuse and GERD secondary to gastritis who presented to the ED on 12/30/23 for complaints of arm and leg tremors/shakes and admitted for alcohol dependence with high risk for withdrawal.    EtOH dependence with high risk for withdrawal  Complaints of ***  Blood alcohol level 307 on admission  S/p 1L NS bolus, ketorolac 30 mg IV, and. lorazepam 2 mg IV in the ED  Seizure and aspiration precautions  PTA thiamine 100 mg, folic acid 1 mg, and multivitamins continued  CIWA protocol, diazepam 5 mg q1 PRN CIWA 8 or greater given lorazepam is on back order as per discussion with pharmacist  Counseled on the importance of alcohol cessation    Transaminitis, suspect secondary to EtOH abuse   and ALT 74 on admission, 2:1 ratio consistent with EtOH use  Avoid hepatotoxic agents  Trend LFTs    Leukopenia  WBC 2.40K on admission  Afebrile, no signs of infection  Monitor WBC trend      Chronic medical conditions:  GERD secondary to gastritis: PTA pantoprazole 40 mg and famotidine 20 mg BID  Normocytic anemia, appears chronic: hgb 12.2 on admission, appears around baseline, no signs of bleeding, monitor    Medication reconciliation completed using discharge med rec from 12/19/23. Patient reports he has not had any medication changes since then.  Stefan Degroot is a 58 year old male with PMHx of EtOH abuse and GERD secondary to gastritis who presented to the ED on 12/30/23 for complaints of arm and leg tremors/shakes and admitted for alcohol dependence with high risk for withdrawal.    EtOH dependence with high risk for withdrawal  Complaints of ***  Blood alcohol level 307 on admission  S/p 1L NS bolus, ketorolac 30 mg IV, and. lorazepam 2 mg IV in the ED  Seizure and aspiration precautions  PTA thiamine 100 mg, folic acid 1 mg, and multivitamins continued  CIWA protocol, diazepam 5 mg q1 PRN CIWA 8 or greater given lorazepam is on back order as per discussion with pharmacist  Counseled on the importance of alcohol cessation    Transaminitis, suspect secondary to EtOH abuse   and ALT 74 on admission, 2:1 ratio consistent with EtOH use  Avoid hepatotoxic agents  Trend LFTs    Leukopenia  WBC 2.40K on admission  Afebrile, no signs of infection  Monitor WBC trend      Chronic medical conditions:  GERD secondary to gastritis: PTA pantoprazole 40 mg and famotidine 20 mg BID  Microcytic anemia, appears chronic: hgb 12.2 on admission, appears around baseline, no signs of bleeding, monitor    Medication reconciliation completed using discharge med rec from 12/19/23. Patient reports he has not had any medication changes since then.  Stefan Degroot is a 58 year old male with PMHx of EtOH abuse and GERD secondary to gastritis who presented to the ED on 12/30/23 for complaints of arm and leg tremors/shakes and admitted for alcohol dependence with high risk for withdrawal.    EtOH dependence with high risk for withdrawal  Complaints of b/l hand tremors and abdominal pain that became worse today  Drinks 1 glass of whiskey on most days x 35 years, last drink was 3 days ago  Blood alcohol level 307 on admission  S/p 1L NS bolus, ketorolac 30 mg IV, and. lorazepam 2 mg IV in the ED  Seizure and aspiration precautions  PTA thiamine 100 mg, folic acid 1 mg, and multivitamins continued  CIWA protocol, diazepam 5 mg q1 PRN CIWA 8 or greater given lorazepam is on back order as per discussion with pharmacist  Counseled on the importance of alcohol cessation    Influenza A infection  Droplet precautions, oseltamivir 75 mg BID x 5 days started  Supportive care    Transaminitis, suspect secondary to EtOH abuse   and ALT 74 on admission, 2:1 ratio consistent with EtOH use  Avoid hepatotoxic agents  Trend LFTs    Leukopenia  WBC 2.40K on admission  Afebrile, no signs of infection  Monitor WBC trend      Chronic medical conditions:  GERD secondary to gastritis: PTA pantoprazole 40 mg and famotidine 20 mg BID, sucralfate 1 g QID added  Microcytic anemia, appears chronic: hgb 12.2 on admission, appears around baseline, no signs of bleeding, monitor    Medication reconciliation completed using discharge med rec from 12/19/23. Patient reports he has not had any medication changes since then.

## 2023-12-30 NOTE — ED PROVIDER NOTE - CONSTITUTIONAL, MLM
Well appearing, awake, alert, oriented to person, place, time/situation and in no apparent distress. normal... Well appearing, awake, alert, oriented to person, place, time/situation and appears anxious

## 2023-12-30 NOTE — H&P ADULT - NSHPPHYSICALEXAM_GEN_ALL_CORE
T(C): 36.6 (12-30-23 @ 23:35), Max: 36.7 (12-30-23 @ 22:25)  HR: 64 (12-30-23 @ 23:35) (61 - 64)  BP: 127/73 (12-30-23 @ 23:35) (124/86 - 159/91)  RR: 21 (12-30-23 @ 23:35) (19 - 21)  SpO2: 99% (12-30-23 @ 23:35) (97% - 99%)    CONSTITUTIONAL: Well groomed, alert  EYES: PERRLA and symmetric, EOMI  ENMT: Oral mucosa with moist membranes  RESP: No respiratory distress, no use of accessory muscles  CV: tachycardic  GI: Soft, ND, mildly tender to palpation in epigastric region  NEURO: b/l hand tremors appreciated

## 2023-12-30 NOTE — H&P ADULT - HISTORY OF PRESENT ILLNESS
Stefan Degroot is a 58 year old male with PMHx of EtOH abuse and GERD secondary to gastritis who presented to the ED on 12/30/23 for complaints of arm and leg tremors/shakes.    Recent admission from 12/18/23 - 12/19/23 for alcohol dependence with high risk for withdrawal. Started on CIWA protocol with librium taper. Signed out AMA.    In the ED, VSS. WBC 2.40K, hgb 12.2, MCV 76.9, , lipase 112. Blood alcohol level 307. Received 1L NS bolus, ketorolac 30 mg IV, and. lorazepam 2 mg IV. Stefan Degroot is a 58 year old male with PMHx of EtOH abuse and GERD secondary to gastritis who presented to the ED on 12/30/23 for complaints of arm and leg tremors/shakes and abdominal pain.    History is limited as patient is a poor historian. Patient reports he has been having abdominal pain for the past couple days.  Today, it became "a big problem." Took analgesics at home without relief. He tried to stay at home because he does not like coming to the hospital but it was so severe he came to the ER for further evaluation. Of note, he typically drinks 1 glass of whiskey on most days for the past 35 years. Last drink was three days ago.    Recent admission from 12/18/23 - 12/19/23 for alcohol dependence with high risk for withdrawal. Started on CIWA protocol with librium taper. Signed out AMA.    In the ED, VSS. WBC 2.40K, hgb 12.2, MCV 76.9, , lipase 112. Blood alcohol level 307. Influenza A positive. Received 1L NS bolus, ketorolac 30 mg IV, and. lorazepam 2 mg IV. Patient states ER physician did not address his abdominal pain despite telling him multiple times about it. Apologized and discussed with patient the addition of sulcralfate to his GERD regimen.

## 2023-12-30 NOTE — ED PROVIDER NOTE - OBJECTIVE STATEMENT
58-year-old male with history of alcohol abuse and previous withdrawals hypertension PAD and substance abuse otherwise presents to ER due to worsening shakes the arms and legs.  Patient states that in the past he was a heavy drinker and currently would like to quit drinking.  There is some diffuse bodyaches and some epigastric discomfort.

## 2023-12-30 NOTE — ED ADULT NURSE NOTE - NSFALLHARMRISKINTERV_ED_ALL_ED
Assistance OOB with selected safe patient handling equipment if applicable/Assistance with ambulation/Communicate risk of Fall with Harm to all staff, patient, and family/Monitor gait and stability/Monitor for mental status changes and reorient to person, place, and time, as needed/Provide visual cue: red socks, yellow wristband, yellow gown, etc/Reinforce activity limits and safety measures with patient and family/Toileting schedule using arm’s reach rule for commode and bathroom/Use of alarms - bed, stretcher, chair and/or video monitoring/Bed in lowest position, wheels locked, appropriate side rails in place/Call bell, personal items and telephone in reach/Instruct patient to call for assistance before getting out of bed/chair/stretcher/Non-slip footwear applied when patient is off stretcher/[place] to call system/Physically safe environment - no spills, clutter or unnecessary equipment/Purposeful Proactive Rounding/Room/bathroom lighting operational, light cord in reach Assistance OOB with selected safe patient handling equipment if applicable/Assistance with ambulation/Communicate risk of Fall with Harm to all staff, patient, and family/Monitor gait and stability/Monitor for mental status changes and reorient to person, place, and time, as needed/Provide visual cue: red socks, yellow wristband, yellow gown, etc/Reinforce activity limits and safety measures with patient and family/Toileting schedule using arm’s reach rule for commode and bathroom/Use of alarms - bed, stretcher, chair and/or video monitoring/Bed in lowest position, wheels locked, appropriate side rails in place/Call bell, personal items and telephone in reach/Instruct patient to call for assistance before getting out of bed/chair/stretcher/Non-slip footwear applied when patient is off stretcher/[place] to call system/Physically safe environment - no spills, clutter or unnecessary equipment/Purposeful Proactive Rounding/Room/bathroom lighting operational, light cord in reach

## 2023-12-30 NOTE — H&P ADULT - NSHPLABSRESULTS_GEN_ALL_CORE
12.2   2.40  )-----------( 170      ( 30 Dec 2023 20:49 )             39.0   12-30    145  |  115<H>  |  8   ----------------------------<  80  4.2   |  24  |  0.95    Ca    8.3<L>      30 Dec 2023 20:49    TPro  7.7  /  Alb  3.3  /  TBili  0.2  /  DBili  x   /  AST  144<H>  /  ALT  74  /  AlkPhos  65  12-30

## 2023-12-31 DIAGNOSIS — J10.1 INFLUENZA DUE TO OTHER IDENTIFIED INFLUENZA VIRUS WITH OTHER RESPIRATORY MANIFESTATIONS: ICD-10-CM

## 2023-12-31 PROCEDURE — 99232 SBSQ HOSP IP/OBS MODERATE 35: CPT

## 2023-12-31 RX ORDER — PHENOBARBITAL 60 MG
129.6 TABLET ORAL EVERY 6 HOURS
Refills: 0 | Status: DISCONTINUED | OUTPATIENT
Start: 2023-12-31 | End: 2024-01-01

## 2023-12-31 RX ORDER — PANTOPRAZOLE SODIUM 20 MG/1
40 TABLET, DELAYED RELEASE ORAL ONCE
Refills: 0 | Status: COMPLETED | OUTPATIENT
Start: 2023-12-31 | End: 2023-12-31

## 2023-12-31 RX ORDER — PHENOBARBITAL 60 MG
16.2 TABLET ORAL EVERY 6 HOURS
Refills: 0 | Status: DISCONTINUED | OUTPATIENT
Start: 2024-01-03 | End: 2024-01-01

## 2023-12-31 RX ORDER — SUCRALFATE 1 G
1 TABLET ORAL EVERY 6 HOURS
Refills: 0 | Status: DISCONTINUED | OUTPATIENT
Start: 2023-12-31 | End: 2024-01-01

## 2023-12-31 RX ORDER — PHENOBARBITAL 60 MG
64.8 TABLET ORAL EVERY 6 HOURS
Refills: 0 | Status: DISCONTINUED | OUTPATIENT
Start: 2024-01-01 | End: 2024-01-01

## 2023-12-31 RX ORDER — PHENOBARBITAL 60 MG
32.4 TABLET ORAL EVERY 6 HOURS
Refills: 0 | Status: DISCONTINUED | OUTPATIENT
Start: 2024-01-02 | End: 2024-01-01

## 2023-12-31 RX ORDER — MORPHINE SULFATE 50 MG/1
2 CAPSULE, EXTENDED RELEASE ORAL ONCE
Refills: 0 | Status: DISCONTINUED | OUTPATIENT
Start: 2023-12-31 | End: 2023-12-31

## 2023-12-31 RX ADMIN — Medication 5 MILLIGRAM(S): at 03:13

## 2023-12-31 RX ADMIN — FAMOTIDINE 20 MILLIGRAM(S): 10 INJECTION INTRAVENOUS at 17:40

## 2023-12-31 RX ADMIN — Medication 650 MILLIGRAM(S): at 09:06

## 2023-12-31 RX ADMIN — Medication 1 GRAM(S): at 17:41

## 2023-12-31 RX ADMIN — Medication 1 TABLET(S): at 11:49

## 2023-12-31 RX ADMIN — Medication 1 GRAM(S): at 06:07

## 2023-12-31 RX ADMIN — PANTOPRAZOLE SODIUM 40 MILLIGRAM(S): 20 TABLET, DELAYED RELEASE ORAL at 04:19

## 2023-12-31 RX ADMIN — Medication 1 MILLIGRAM(S): at 11:50

## 2023-12-31 RX ADMIN — Medication 129.6 MILLIGRAM(S): at 11:50

## 2023-12-31 RX ADMIN — Medication 129.6 MILLIGRAM(S): at 17:40

## 2023-12-31 RX ADMIN — Medication 1 GRAM(S): at 03:13

## 2023-12-31 RX ADMIN — Medication 5 MILLIGRAM(S): at 10:55

## 2023-12-31 RX ADMIN — Medication 1 GRAM(S): at 11:52

## 2023-12-31 RX ADMIN — Medication 75 MILLIGRAM(S): at 06:07

## 2023-12-31 RX ADMIN — PANTOPRAZOLE SODIUM 40 MILLIGRAM(S): 20 TABLET, DELAYED RELEASE ORAL at 06:07

## 2023-12-31 RX ADMIN — Medication 650 MILLIGRAM(S): at 21:11

## 2023-12-31 RX ADMIN — Medication 5 MILLIGRAM(S): at 15:59

## 2023-12-31 RX ADMIN — Medication 75 MILLIGRAM(S): at 17:41

## 2023-12-31 RX ADMIN — MORPHINE SULFATE 2 MILLIGRAM(S): 50 CAPSULE, EXTENDED RELEASE ORAL at 21:06

## 2023-12-31 RX ADMIN — Medication 100 MILLIGRAM(S): at 11:50

## 2023-12-31 RX ADMIN — Medication 650 MILLIGRAM(S): at 11:42

## 2023-12-31 RX ADMIN — FAMOTIDINE 20 MILLIGRAM(S): 10 INJECTION INTRAVENOUS at 06:07

## 2023-12-31 RX ADMIN — ATORVASTATIN CALCIUM 20 MILLIGRAM(S): 80 TABLET, FILM COATED ORAL at 21:43

## 2023-12-31 RX ADMIN — Medication 650 MILLIGRAM(S): at 20:11

## 2023-12-31 NOTE — CHART NOTE - NSCHARTNOTEFT_GEN_A_CORE
Medicine Hospitalist PA    Was notified by RN pt with pain. Pt is well-known at [facility] and usually exhibits severe alcohol withdrawals with hallucination and agitation on Day 3. Currently pt with CIWA 6 on Day 2. Has not received any PRN meds. Will start patient on phenobarb taper as pt is swallowing meds and currently following commands. Discussed with RN and pharmacy. Continue to monitor. Medicine Hospitalist PA    Was notified by RN pt with pain. Pt is well-known at [facility] and usually exhibits severe alcohol withdrawals with hallucination and agitation on Day 3. Currently pt with CIWA 6 on Day 2. Has not received any PRN meds. Will start patient on phenobarb taper as pt is swallowing meds and currently following commands. Discussed with RN and pharmacy. Continue to monitor.

## 2023-12-31 NOTE — CHART NOTE - NSCHARTNOTEFT_GEN_A_CORE
Called for admission by Dr. Restrepo. Attempted to see patient for admission. However, he states he does not want to be admitted, was not told he was being admitted, and wants to go home. Discussed with ER physician, Dr. Restrepo. Discussed with admitting to cancel admission order.

## 2023-12-31 NOTE — PATIENT PROFILE ADULT - FALL HARM RISK - HARM RISK INTERVENTIONS
Assistance with ambulation/Assistance OOB with selected safe patient handling equipment/Communicate Risk of Fall with Harm to all staff/Monitor for mental status changes/Monitor gait and stability/Reinforce activity limits and safety measures with patient and family/Tailored Fall Risk Interventions/Toileting schedule using arm’s reach rule for commode and bathroom/Use of alarms - bed, chair and/or voice tab/Visual Cue: Yellow wristband and red socks/Bed in lowest position, wheels locked, appropriate side rails in place/Call bell, personal items and telephone in reach/Instruct patient to call for assistance before getting out of bed or chair/Non-slip footwear when patient is out of bed/[place] to call system/Physically safe environment - no spills, clutter or unnecessary equipment/Purposeful Proactive Rounding/Room/bathroom lighting operational, light cord in reach Assistance with ambulation/Assistance OOB with selected safe patient handling equipment/Communicate Risk of Fall with Harm to all staff/Monitor for mental status changes/Monitor gait and stability/Reinforce activity limits and safety measures with patient and family/Tailored Fall Risk Interventions/Toileting schedule using arm’s reach rule for commode and bathroom/Use of alarms - bed, chair and/or voice tab/Visual Cue: Yellow wristband and red socks/Bed in lowest position, wheels locked, appropriate side rails in place/Call bell, personal items and telephone in reach/Instruct patient to call for assistance before getting out of bed or chair/Non-slip footwear when patient is out of bed/[place] to call system/Physically safe environment - no spills, clutter or unnecessary equipment/Purposeful Proactive Rounding/Room/bathroom lighting operational, light cord in reach

## 2023-12-31 NOTE — PATIENT PROFILE ADULT - MEDICATIONS/VISITS
----- Message from Leidy Chung sent at 4/28/2023  8:50 AM CDT -----  Contact: Pt Home [phone]  Patient is calling in regards to her wanting to put in an order for a Bedside Commode, and patient also have swelling in both legs.     Patient said she wanted for you to know that she have been released from the hospital, and she would like for you to call her to give her directions on how to take her medications.      no

## 2024-01-01 VITALS
RESPIRATION RATE: 18 BRPM | HEART RATE: 71 BPM | TEMPERATURE: 98 F | OXYGEN SATURATION: 96 % | DIASTOLIC BLOOD PRESSURE: 79 MMHG | SYSTOLIC BLOOD PRESSURE: 139 MMHG

## 2024-01-01 LAB
ANION GAP SERPL CALC-SCNC: 8 MMOL/L — SIGNIFICANT CHANGE UP (ref 5–17)
ANION GAP SERPL CALC-SCNC: 8 MMOL/L — SIGNIFICANT CHANGE UP (ref 5–17)
BILIRUB SERPL-MCNC: 0.6 MG/DL — SIGNIFICANT CHANGE UP (ref 0.2–1.2)
BILIRUB SERPL-MCNC: 0.6 MG/DL — SIGNIFICANT CHANGE UP (ref 0.2–1.2)
BUN SERPL-MCNC: 11 MG/DL — SIGNIFICANT CHANGE UP (ref 7–23)
BUN SERPL-MCNC: 11 MG/DL — SIGNIFICANT CHANGE UP (ref 7–23)
CALCIUM SERPL-MCNC: 8.3 MG/DL — LOW (ref 8.5–10.1)
CALCIUM SERPL-MCNC: 8.3 MG/DL — LOW (ref 8.5–10.1)
CHLORIDE SERPL-SCNC: 107 MMOL/L — SIGNIFICANT CHANGE UP (ref 96–108)
CHLORIDE SERPL-SCNC: 107 MMOL/L — SIGNIFICANT CHANGE UP (ref 96–108)
CK SERPL-CCNC: 158 U/L — SIGNIFICANT CHANGE UP (ref 26–308)
CK SERPL-CCNC: 158 U/L — SIGNIFICANT CHANGE UP (ref 26–308)
CO2 SERPL-SCNC: 23 MMOL/L — SIGNIFICANT CHANGE UP (ref 22–31)
CO2 SERPL-SCNC: 23 MMOL/L — SIGNIFICANT CHANGE UP (ref 22–31)
CREAT SERPL-MCNC: 0.95 MG/DL — SIGNIFICANT CHANGE UP (ref 0.5–1.3)
CREAT SERPL-MCNC: 0.95 MG/DL — SIGNIFICANT CHANGE UP (ref 0.5–1.3)
EGFR: 94 ML/MIN/1.73M2 — SIGNIFICANT CHANGE UP
EGFR: 94 ML/MIN/1.73M2 — SIGNIFICANT CHANGE UP
GLUCOSE SERPL-MCNC: 89 MG/DL — SIGNIFICANT CHANGE UP (ref 70–99)
GLUCOSE SERPL-MCNC: 89 MG/DL — SIGNIFICANT CHANGE UP (ref 70–99)
HCT VFR BLD CALC: 34.6 % — LOW (ref 39–50)
HCT VFR BLD CALC: 34.6 % — LOW (ref 39–50)
HGB BLD-MCNC: 11.1 G/DL — LOW (ref 13–17)
HGB BLD-MCNC: 11.1 G/DL — LOW (ref 13–17)
INR BLD: 0.93 RATIO — SIGNIFICANT CHANGE UP (ref 0.85–1.18)
INR BLD: 0.93 RATIO — SIGNIFICANT CHANGE UP (ref 0.85–1.18)
MAGNESIUM SERPL-MCNC: 1.5 MG/DL — LOW (ref 1.6–2.6)
MAGNESIUM SERPL-MCNC: 1.5 MG/DL — LOW (ref 1.6–2.6)
MCHC RBC-ENTMCNC: 24.3 PG — LOW (ref 27–34)
MCHC RBC-ENTMCNC: 24.3 PG — LOW (ref 27–34)
MCHC RBC-ENTMCNC: 32.1 G/DL — SIGNIFICANT CHANGE UP (ref 32–36)
MCHC RBC-ENTMCNC: 32.1 G/DL — SIGNIFICANT CHANGE UP (ref 32–36)
MCV RBC AUTO: 75.9 FL — LOW (ref 80–100)
MCV RBC AUTO: 75.9 FL — LOW (ref 80–100)
MELD SCORE WITH DIALYSIS: 20 POINTS — SIGNIFICANT CHANGE UP
MELD SCORE WITH DIALYSIS: 20 POINTS — SIGNIFICANT CHANGE UP
MELD SCORE WITHOUT DIALYSIS: 6 POINTS — SIGNIFICANT CHANGE UP
MELD SCORE WITHOUT DIALYSIS: 6 POINTS — SIGNIFICANT CHANGE UP
NRBC # BLD: 0 /100 WBCS — SIGNIFICANT CHANGE UP (ref 0–0)
NRBC # BLD: 0 /100 WBCS — SIGNIFICANT CHANGE UP (ref 0–0)
PHOSPHATE SERPL-MCNC: 2.5 MG/DL — SIGNIFICANT CHANGE UP (ref 2.5–4.5)
PHOSPHATE SERPL-MCNC: 2.5 MG/DL — SIGNIFICANT CHANGE UP (ref 2.5–4.5)
PLATELET # BLD AUTO: 108 K/UL — LOW (ref 150–400)
PLATELET # BLD AUTO: 108 K/UL — LOW (ref 150–400)
POTASSIUM SERPL-MCNC: 3 MMOL/L — LOW (ref 3.5–5.3)
POTASSIUM SERPL-MCNC: 3 MMOL/L — LOW (ref 3.5–5.3)
POTASSIUM SERPL-SCNC: 3 MMOL/L — LOW (ref 3.5–5.3)
POTASSIUM SERPL-SCNC: 3 MMOL/L — LOW (ref 3.5–5.3)
PROTHROM AB SERPL-ACNC: 11.2 SEC — SIGNIFICANT CHANGE UP (ref 9.5–13)
PROTHROM AB SERPL-ACNC: 11.2 SEC — SIGNIFICANT CHANGE UP (ref 9.5–13)
RBC # BLD: 4.56 M/UL — SIGNIFICANT CHANGE UP (ref 4.2–5.8)
RBC # BLD: 4.56 M/UL — SIGNIFICANT CHANGE UP (ref 4.2–5.8)
RBC # FLD: 21.3 % — HIGH (ref 10.3–14.5)
RBC # FLD: 21.3 % — HIGH (ref 10.3–14.5)
SODIUM SERPL-SCNC: 138 MMOL/L — SIGNIFICANT CHANGE UP (ref 135–145)
SODIUM SERPL-SCNC: 138 MMOL/L — SIGNIFICANT CHANGE UP (ref 135–145)
WBC # BLD: 1.65 K/UL — LOW (ref 3.8–10.5)
WBC # BLD: 1.65 K/UL — LOW (ref 3.8–10.5)
WBC # FLD AUTO: 1.65 K/UL — LOW (ref 3.8–10.5)
WBC # FLD AUTO: 1.65 K/UL — LOW (ref 3.8–10.5)

## 2024-01-01 PROCEDURE — 99239 HOSP IP/OBS DSCHRG MGMT >30: CPT

## 2024-01-01 RX ORDER — THIAMINE MONONITRATE (VIT B1) 100 MG
1 TABLET ORAL
Qty: 30 | Refills: 0
Start: 2024-01-01 | End: 2024-01-30

## 2024-01-01 RX ORDER — PANTOPRAZOLE SODIUM 20 MG/1
1 TABLET, DELAYED RELEASE ORAL
Qty: 30 | Refills: 0
Start: 2024-01-01 | End: 2024-01-30

## 2024-01-01 RX ORDER — POTASSIUM CHLORIDE 20 MEQ
40 PACKET (EA) ORAL EVERY 4 HOURS
Refills: 0 | Status: DISCONTINUED | OUTPATIENT
Start: 2024-01-01 | End: 2024-01-01

## 2024-01-01 RX ADMIN — FAMOTIDINE 20 MILLIGRAM(S): 10 INJECTION INTRAVENOUS at 05:33

## 2024-01-01 RX ADMIN — Medication 1 GRAM(S): at 13:29

## 2024-01-01 RX ADMIN — Medication 1 GRAM(S): at 05:41

## 2024-01-01 RX ADMIN — PANTOPRAZOLE SODIUM 40 MILLIGRAM(S): 20 TABLET, DELAYED RELEASE ORAL at 06:52

## 2024-01-01 RX ADMIN — Medication 650 MILLIGRAM(S): at 06:35

## 2024-01-01 RX ADMIN — Medication 1 TABLET(S): at 12:29

## 2024-01-01 RX ADMIN — Medication 100 MILLIGRAM(S): at 13:30

## 2024-01-01 RX ADMIN — Medication 1 MILLIGRAM(S): at 12:29

## 2024-01-01 RX ADMIN — Medication 75 MILLIGRAM(S): at 05:35

## 2024-01-01 RX ADMIN — Medication 129.6 MILLIGRAM(S): at 05:33

## 2024-01-01 RX ADMIN — Medication 129.6 MILLIGRAM(S): at 02:26

## 2024-01-01 RX ADMIN — Medication 650 MILLIGRAM(S): at 05:35

## 2024-01-01 RX ADMIN — Medication 1 GRAM(S): at 02:26

## 2024-01-01 RX ADMIN — Medication 64.8 MILLIGRAM(S): at 13:31

## 2024-01-01 NOTE — CHART NOTE - NSCHARTNOTEFT_GEN_A_CORE
Medicine Hospitalist PA    Called by RN that patient would like to leave AMA. Patient was seen at the bedside to discuss the risks of leaving against medical advice, with many attempts made to reason to remain in the hospital to be safely discharged. Patient is A&Ox4  and has full capacity. Patient would still like to leave AMA and was made aware of the consequences of leaving AMA, including worsening of withdrawals and risk of death. Patient verbalized understanding and signed AMA form, witnessed by PRETTY Singletary. Discussed with Dr. Cartagena, who is in agreement. Medication refills sent to patients pharmacy, and discussed signs of worsening withdrawal/DT to be aware of to necessitate return to hospital.

## 2024-01-01 NOTE — PROGRESS NOTE ADULT - ASSESSMENT
Stefan Degroot is a 58 year old male with PMHx of EtOH abuse and GERD secondary to gastritis who presented to the ED on 12/30/23 for complaints of arm and leg tremors/shakes and admitted for alcohol dependence with high risk for withdrawal.    EtOH dependence with high risk for withdrawal  Complaints of b/l hand tremors and abdominal pain that became worse today  Drinks 1 glass of whiskey on most days x 35 years, last drink was 3 days ago  Blood alcohol level 307 on admission  start phenobarb taper. pt high risk for icu admission   Counseled on the importance of alcohol cessation    Influenza A infection  Droplet precautions, oseltamivir 75 mg BID x 5 days started  Supportive care    Transaminitis, suspect secondary to EtOH abuse   and ALT 74 on admission, 2:1 ratio consistent with EtOH use  Avoid hepatotoxic agents  Trend LFTs    Leukopenia  WBC 2.40K on admission  Afebrile, no signs of infection  Monitor WBC trend      Chronic medical conditions:  GERD secondary to gastritis: PTA pantoprazole 40 mg and famotidine 20 mg BID, sucralfate 1 g QID added  Microcytic anemia, appears chronic: hgb 12.2 on admission, appears around baseline, no signs of bleeding, monitor    
Stefan Degroot is a 58 year old male with PMHx of EtOH abuse and GERD secondary to gastritis who presented to the ED on 12/30/23 for complaints of arm and leg tremors/shakes and admitted for alcohol dependence with high risk for withdrawal.    EtOH dependence with high risk for withdrawal  Complaints of b/l hand tremors and abdominal pain that became worse today  Drinks 1 glass of whiskey on most days x 35 years, last drink was 3 days ago  Blood alcohol level 307 on admission  start phenobarb taper. pt high risk for icu admission   Counseled on the importance of alcohol cessation    Influenza A infection  Droplet precautions, oseltamivir 75 mg BID x 5 days started  Supportive care    Transaminitis, suspect secondary to EtOH abuse   and ALT 74 on admission, 2:1 ratio consistent with EtOH use  Avoid hepatotoxic agents  Trend LFTs    Leukopenia  WBC 2.40K on admission  Afebrile, no signs of infection  Monitor WBC trend      Chronic medical conditions:  GERD secondary to gastritis: PTA pantoprazole 40 mg and famotidine 20 mg BID, sucralfate 1 g QID added  Microcytic anemia, appears chronic: hgb 12.2 on admission, appears around baseline, no signs of bleeding, monitor

## 2024-01-01 NOTE — PROGRESS NOTE ADULT - SUBJECTIVE AND OBJECTIVE BOX
Patient is a 58y old  Male who presents with a chief complaint of Alcohol dependence with high risk for withdrawal (30 Dec 2023 23:21)      INTERVAL HPI/OVERNIGHT EVENTS: c/o of generalized pain     MEDICATIONS  (STANDING):  atorvastatin 20 milliGRAM(s) Oral at bedtime  famotidine    Tablet 20 milliGRAM(s) Oral two times a day  folic acid 1 milliGRAM(s) Oral daily  multivitamin 1 Tablet(s) Oral daily  oseltamivir 75 milliGRAM(s) Oral two times a day  pantoprazole    Tablet 40 milliGRAM(s) Oral before breakfast  PHENobarbital 16.2 milliGRAM(s) Oral every 6 hours  PHENobarbital 64.8 milliGRAM(s) Oral every 6 hours  sucralfate 1 Gram(s) Oral every 6 hours  thiamine 100 milliGRAM(s) Oral daily    MEDICATIONS  (PRN):  acetaminophen     Tablet .. 650 milliGRAM(s) Oral every 6 hours PRN Temp greater or equal to 38C (100.4F), Mild Pain (1 - 3)  aluminum hydroxide/magnesium hydroxide/simethicone Suspension 30 milliLiter(s) Oral every 4 hours PRN Dyspepsia  diazepam    Tablet 5 milliGRAM(s) Oral every 1 hour PRN CIWA-Ar  score 8 or greater  melatonin 3 milliGRAM(s) Oral at bedtime PRN Insomnia  ondansetron Injectable 4 milliGRAM(s) IV Push every 8 hours PRN Nausea and/or Vomiting  Vomiting      Allergies    No Known Allergies    Intolerances        REVIEW OF SYSTEMS:  CONSTITUTIONAL: No fever, weight loss  EYES: No eye pain, visual disturbances, or discharge  ENMT:  No difficulty hearing, tinnitus, vertigo; No sinus or throat pain  RESPIRATORY: No cough, wheezing, chills or hemoptysis; No shortness of breath  CARDIOVASCULAR: No chest pain, palpitations, dizziness, or leg swelling  GASTROINTESTINAL: No abdominal or epigastric pain. No nausea, vomiting, or hematemesis; No diarrhea or constipation. No melena or hematochezia.  GENITOURINARY: No dysuria, frequency, hematuria, or incontinence  NEUROLOGICAL: No headaches, memory loss, loss of strength, numbness, or tremors  SKIN: No itching, burning, rashes, or lesions   MUSCULOSKELETAL: No joint pain or swelling; No muscle, back, or extremity pain  PSYCHIATRIC: No depression, anxiety, mood swings, or difficulty sleeping  HEME/LYMPH: No easy bruising, or bleeding gums      Vital Signs Last 24 Hrs  T(C): 37.5 (01 Jan 2024 00:20), Max: 37.5 (01 Jan 2024 00:20)  T(F): 99.5 (01 Jan 2024 00:20), Max: 99.5 (01 Jan 2024 00:20)  HR: 58 (01 Jan 2024 00:20) (58 - 69)  BP: 136/74 (01 Jan 2024 00:20) (136/74 - 162/94)  BP(mean): --  RR: 17 (01 Jan 2024 00:20) (17 - 18)  SpO2: 97% (01 Jan 2024 00:20) (97% - 99%)    Parameters below as of 31 Dec 2023 17:26  Patient On (Oxygen Delivery Method): room air          PHYSICAL EXAM:  GENERAL: NAD, mild tremor   HEAD:  Atraumatic, Normocephalic  EYES: EOMI, PERRLA, conjunctiva and sclera clear  ENMT: No tonsillar erythema, exudates, or enlargement;   NECK: Supple, Normal thyroid  NERVOUS SYSTEM:  Alert & Oriented X3, Good concentration; Motor Strength 5/5 B/L upper and lower extremities; DTRs 2+ intact and symmetric  CHEST/LUNG: CTABL; No rales, rhonchi, wheezing, or rubs  HEART: Regular rate and rhythm; No murmurs, rubs, or gallops  ABDOMEN: Soft, Nontender, Nondistended; Bowel sounds present  EXTREMITIES:  2+ Peripheral Pulses, No clubbing, cyanosis, or edema  LYMPH: No lymphadenopathy noted  SKIN: No rashes or lesions    LABS:                                   11.1   1.65  )-----------( 108      ( 01 Jan 2024 07:16 )             34.6     01-01    138  |  107  |  11  ----------------------------<  89  3.0<L>   |  23  |  0.95    Ca    8.3<L>      01 Jan 2024 07:16  Phos  2.5     01-01  Mg     1.5     01-01    TPro  x   /  Alb  x   /  TBili  0.6  /  DBili  x   /  AST  x   /  ALT  x   /  AlkPhos  x   01-01    PT/INR - ( 01 Jan 2024 07:16 )   PT: 11.2 sec;   INR: 0.93 ratio           Urinalysis Basic - ( 01 Jan 2024 07:16 )    Color: x / Appearance: x / SG: x / pH: x  Gluc: 89 mg/dL / Ketone: x  / Bili: x / Urobili: x   Blood: x / Protein: x / Nitrite: x   Leuk Esterase: x / RBC: x / WBC x   Sq Epi: x / Non Sq Epi: x / Bacteria: x            POCT Blood Glucose.: 89 mg/dL (30 Dec 2023 18:07)      RADIOLOGY & ADDITIONAL TESTS:    Imaging Personally Reviewed:  [x ] YES  [ ] NO    Consultant(s) Notes Reviewed:  [ ] YES  [ ] NO    Care Discussed with Consultants/Other Providers [ ] YES  [ ] NO
Patient is a 58y old  Male who presents with a chief complaint of Alcohol dependence with high risk for withdrawal (30 Dec 2023 23:21)      INTERVAL HPI/OVERNIGHT EVENTS: c/o of generalized pain     MEDICATIONS  (STANDING):  atorvastatin 20 milliGRAM(s) Oral at bedtime  famotidine    Tablet 20 milliGRAM(s) Oral two times a day  folic acid 1 milliGRAM(s) Oral daily  multivitamin 1 Tablet(s) Oral daily  oseltamivir 75 milliGRAM(s) Oral two times a day  pantoprazole    Tablet 40 milliGRAM(s) Oral before breakfast  PHENobarbital 129.6 milliGRAM(s) Oral every 6 hours  PHENobarbital 16.2 milliGRAM(s) Oral every 6 hours  sucralfate 1 Gram(s) Oral every 6 hours  thiamine 100 milliGRAM(s) Oral daily    MEDICATIONS  (PRN):  acetaminophen     Tablet .. 650 milliGRAM(s) Oral every 6 hours PRN Temp greater or equal to 38C (100.4F), Mild Pain (1 - 3)  aluminum hydroxide/magnesium hydroxide/simethicone Suspension 30 milliLiter(s) Oral every 4 hours PRN Dyspepsia  diazepam    Tablet 5 milliGRAM(s) Oral every 1 hour PRN CIWA-Ar  score 8 or greater  melatonin 3 milliGRAM(s) Oral at bedtime PRN Insomnia  ondansetron Injectable 4 milliGRAM(s) IV Push every 8 hours PRN Nausea and/or Vomiting      Allergies    No Known Allergies    Intolerances        REVIEW OF SYSTEMS:  CONSTITUTIONAL: No fever, weight loss  EYES: No eye pain, visual disturbances, or discharge  ENMT:  No difficulty hearing, tinnitus, vertigo; No sinus or throat pain  RESPIRATORY: No cough, wheezing, chills or hemoptysis; No shortness of breath  CARDIOVASCULAR: No chest pain, palpitations, dizziness, or leg swelling  GASTROINTESTINAL: No abdominal or epigastric pain. No nausea, vomiting, or hematemesis; No diarrhea or constipation. No melena or hematochezia.  GENITOURINARY: No dysuria, frequency, hematuria, or incontinence  NEUROLOGICAL: No headaches, memory loss, loss of strength, numbness, or tremors  SKIN: No itching, burning, rashes, or lesions   MUSCULOSKELETAL: No joint pain or swelling; No muscle, back, or extremity pain  PSYCHIATRIC: No depression, anxiety, mood swings, or difficulty sleeping  HEME/LYMPH: No easy bruising, or bleeding gums      Vital Signs Last 24 Hrs  T(C): 36.3 (31 Dec 2023 11:00), Max: 36.8 (31 Dec 2023 08:57)  T(F): 97.4 (31 Dec 2023 11:00), Max: 98.3 (31 Dec 2023 08:57)  HR: 69 (31 Dec 2023 11:00) (55 - 69)  BP: 158/91 (31 Dec 2023 11:00) (124/86 - 183/100)  BP(mean): --  RR: 18 (31 Dec 2023 11:00) (18 - 21)  SpO2: 98% (31 Dec 2023 11:00) (97% - 100%)    Parameters below as of 31 Dec 2023 11:00  Patient On (Oxygen Delivery Method): room air        PHYSICAL EXAM:  GENERAL: NAD, mild tremor   HEAD:  Atraumatic, Normocephalic  EYES: EOMI, PERRLA, conjunctiva and sclera clear  ENMT: No tonsillar erythema, exudates, or enlargement;   NECK: Supple, Normal thyroid  NERVOUS SYSTEM:  Alert & Oriented X3, Good concentration; Motor Strength 5/5 B/L upper and lower extremities; DTRs 2+ intact and symmetric  CHEST/LUNG: CTABL; No rales, rhonchi, wheezing, or rubs  HEART: Regular rate and rhythm; No murmurs, rubs, or gallops  ABDOMEN: Soft, Nontender, Nondistended; Bowel sounds present  EXTREMITIES:  2+ Peripheral Pulses, No clubbing, cyanosis, or edema  LYMPH: No lymphadenopathy noted  SKIN: No rashes or lesions    LABS:                        12.2   2.40  )-----------( 170      ( 30 Dec 2023 20:49 )             39.0     12-30    145  |  115<H>  |  8   ----------------------------<  80  4.2   |  24  |  0.95    Ca    8.3<L>      30 Dec 2023 20:49    TPro  7.7  /  Alb  3.3  /  TBili  0.2  /  DBili  x   /  AST  144<H>  /  ALT  74  /  AlkPhos  65  12-30      Urinalysis Basic - ( 30 Dec 2023 20:49 )    Color: x / Appearance: x / SG: x / pH: x  Gluc: 80 mg/dL / Ketone: x  / Bili: x / Urobili: x   Blood: x / Protein: x / Nitrite: x   Leuk Esterase: x / RBC: x / WBC x   Sq Epi: x / Non Sq Epi: x / Bacteria: x      CAPILLARY BLOOD GLUCOSE      POCT Blood Glucose.: 89 mg/dL (30 Dec 2023 18:07)      RADIOLOGY & ADDITIONAL TESTS:    Imaging Personally Reviewed:  [x ] YES  [ ] NO    Consultant(s) Notes Reviewed:  [ ] YES  [ ] NO    Care Discussed with Consultants/Other Providers [ ] YES  [ ] NO

## 2024-01-03 DIAGNOSIS — Z71.41 ALCOHOL ABUSE COUNSELING AND SURVEILLANCE OF ALCOHOLIC: ICD-10-CM

## 2024-01-03 DIAGNOSIS — K29.20 ALCOHOLIC GASTRITIS WITHOUT BLEEDING: ICD-10-CM

## 2024-01-03 DIAGNOSIS — J10.1 INFLUENZA DUE TO OTHER IDENTIFIED INFLUENZA VIRUS WITH OTHER RESPIRATORY MANIFESTATIONS: ICD-10-CM

## 2024-01-03 DIAGNOSIS — D50.9 IRON DEFICIENCY ANEMIA, UNSPECIFIED: ICD-10-CM

## 2024-01-03 DIAGNOSIS — F10.231 ALCOHOL DEPENDENCE WITH WITHDRAWAL DELIRIUM: ICD-10-CM

## 2024-01-03 DIAGNOSIS — D72.819 DECREASED WHITE BLOOD CELL COUNT, UNSPECIFIED: ICD-10-CM

## 2024-01-03 DIAGNOSIS — K21.9 GASTRO-ESOPHAGEAL REFLUX DISEASE WITHOUT ESOPHAGITIS: ICD-10-CM

## 2024-01-03 DIAGNOSIS — R10.9 UNSPECIFIED ABDOMINAL PAIN: ICD-10-CM
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
## 2024-01-10 NOTE — ED PROVIDER NOTE - QRS
Psychoeducation Group Note    PATIENT'S NAME: Mariah Gaytan  MRN:   [id number]  :   1985  ACCT. NUMBER: [id number]  DATE OF SERVICE: 1/10/24  START TIME: 11:00 AM  END TIME: 11:50 AM  FACILITATOR: Bernice Mclain LICSW  TOPIC: MH Wellness Group: Mental Health Maintenance  [facility]  TRACK: IOP 1    NUMBER OF PARTICIPANTS: 4    Summary of Group / Topics Discussed:  Mental Health Maintenance:  Social/Coping Bingo: Patients were educated on the importance of balance in meeting our wellness needs. Topic of social/coping was reviewed and patients participated in playing a verbal response style coping/social BINGO game. In this game, patients were challenged to utilize their understanding of themselves and their coping strategies to respond to the questions on their BINGO cards.    Patient Session Goals / Objectives:  Identified the importance of balance in wellness  Explained the important aspects of socialization/effective coping strategies  Listed ways of improving weak areas in social/coping skills      Patient Participation / Response:  Fully participated with the group by sharing personal reflections / insights and openly received / provided feedback with other participants.    Demonstrated understanding of topics discussed through group discussion and participation, Identified / Expressed personal readiness to practice skills, and Verbalized understanding of mental health maintenance topic    Treatment Plan:  Patient has a current master individualized treatment plan.  See Epic treatment plan for more information.    CATARINA Gallego   84

## 2024-01-11 NOTE — PROGRESS NOTE ADULT - PROBLEM/PLAN-2
[Duration: ___ wks] : duration: [unfilled] weeks [Vaginal] : Vaginal [Normal?] : normal delivery [___ lbs.] : [unfilled] lbs [___ oz.] : [unfilled] oz. [Was child in NICU?] : Child was not in NICU DISPLAY PLAN FREE TEXT

## 2024-01-14 ENCOUNTER — EMERGENCY (EMERGENCY)
Facility: HOSPITAL | Age: 59
LOS: 0 days | Discharge: HOME | End: 2024-01-14
Attending: STUDENT IN AN ORGANIZED HEALTH CARE EDUCATION/TRAINING PROGRAM
Payer: MEDICAID

## 2024-01-14 VITALS
WEIGHT: 179.9 LBS | SYSTOLIC BLOOD PRESSURE: 143 MMHG | RESPIRATION RATE: 24 BRPM | DIASTOLIC BLOOD PRESSURE: 99 MMHG | HEIGHT: 66 IN | OXYGEN SATURATION: 98 % | HEART RATE: 140 BPM | TEMPERATURE: 98 F

## 2024-01-14 VITALS
RESPIRATION RATE: 20 BRPM | SYSTOLIC BLOOD PRESSURE: 145 MMHG | DIASTOLIC BLOOD PRESSURE: 88 MMHG | TEMPERATURE: 98 F | OXYGEN SATURATION: 100 % | HEART RATE: 95 BPM

## 2024-01-14 DIAGNOSIS — E78.5 HYPERLIPIDEMIA, UNSPECIFIED: ICD-10-CM

## 2024-01-14 DIAGNOSIS — R00.0 TACHYCARDIA, UNSPECIFIED: ICD-10-CM

## 2024-01-14 DIAGNOSIS — R11.2 NAUSEA WITH VOMITING, UNSPECIFIED: ICD-10-CM

## 2024-01-14 DIAGNOSIS — Z87.19 PERSONAL HISTORY OF OTHER DISEASES OF THE DIGESTIVE SYSTEM: ICD-10-CM

## 2024-01-14 DIAGNOSIS — R10.13 EPIGASTRIC PAIN: ICD-10-CM

## 2024-01-14 LAB
ALBUMIN SERPL ELPH-MCNC: 4.1 G/DL — SIGNIFICANT CHANGE UP (ref 3.3–5)
ALBUMIN SERPL ELPH-MCNC: 4.1 G/DL — SIGNIFICANT CHANGE UP (ref 3.3–5)
ALP SERPL-CCNC: 81 U/L — SIGNIFICANT CHANGE UP (ref 40–120)
ALP SERPL-CCNC: 81 U/L — SIGNIFICANT CHANGE UP (ref 40–120)
ALT FLD-CCNC: 148 U/L — HIGH (ref 12–78)
ALT FLD-CCNC: 148 U/L — HIGH (ref 12–78)
ANION GAP SERPL CALC-SCNC: 16 MMOL/L — SIGNIFICANT CHANGE UP (ref 5–17)
ANION GAP SERPL CALC-SCNC: 16 MMOL/L — SIGNIFICANT CHANGE UP (ref 5–17)
ANISOCYTOSIS BLD QL: SIGNIFICANT CHANGE UP
ANISOCYTOSIS BLD QL: SIGNIFICANT CHANGE UP
AST SERPL-CCNC: 297 U/L — HIGH (ref 15–37)
AST SERPL-CCNC: 297 U/L — HIGH (ref 15–37)
BASOPHILS # BLD AUTO: 0 K/UL — SIGNIFICANT CHANGE UP (ref 0–0.2)
BASOPHILS # BLD AUTO: 0 K/UL — SIGNIFICANT CHANGE UP (ref 0–0.2)
BASOPHILS NFR BLD AUTO: 0 % — SIGNIFICANT CHANGE UP (ref 0–2)
BASOPHILS NFR BLD AUTO: 0 % — SIGNIFICANT CHANGE UP (ref 0–2)
BILIRUB SERPL-MCNC: 0.5 MG/DL — SIGNIFICANT CHANGE UP (ref 0.2–1.2)
BILIRUB SERPL-MCNC: 0.5 MG/DL — SIGNIFICANT CHANGE UP (ref 0.2–1.2)
BUN SERPL-MCNC: 14 MG/DL — SIGNIFICANT CHANGE UP (ref 7–23)
BUN SERPL-MCNC: 14 MG/DL — SIGNIFICANT CHANGE UP (ref 7–23)
CALCIUM SERPL-MCNC: 9.2 MG/DL — SIGNIFICANT CHANGE UP (ref 8.5–10.1)
CALCIUM SERPL-MCNC: 9.2 MG/DL — SIGNIFICANT CHANGE UP (ref 8.5–10.1)
CHLORIDE SERPL-SCNC: 105 MMOL/L — SIGNIFICANT CHANGE UP (ref 96–108)
CHLORIDE SERPL-SCNC: 105 MMOL/L — SIGNIFICANT CHANGE UP (ref 96–108)
CO2 SERPL-SCNC: 21 MMOL/L — LOW (ref 22–31)
CO2 SERPL-SCNC: 21 MMOL/L — LOW (ref 22–31)
CREAT SERPL-MCNC: 1.4 MG/DL — HIGH (ref 0.5–1.3)
CREAT SERPL-MCNC: 1.4 MG/DL — HIGH (ref 0.5–1.3)
EGFR: 59 ML/MIN/1.73M2 — LOW
EGFR: 59 ML/MIN/1.73M2 — LOW
ELLIPTOCYTES BLD QL SMEAR: SLIGHT — SIGNIFICANT CHANGE UP
ELLIPTOCYTES BLD QL SMEAR: SLIGHT — SIGNIFICANT CHANGE UP
EOSINOPHIL # BLD AUTO: 0.03 K/UL — SIGNIFICANT CHANGE UP (ref 0–0.5)
EOSINOPHIL # BLD AUTO: 0.03 K/UL — SIGNIFICANT CHANGE UP (ref 0–0.5)
EOSINOPHIL NFR BLD AUTO: 1 % — SIGNIFICANT CHANGE UP (ref 0–6)
EOSINOPHIL NFR BLD AUTO: 1 % — SIGNIFICANT CHANGE UP (ref 0–6)
GLUCOSE SERPL-MCNC: 133 MG/DL — HIGH (ref 70–99)
GLUCOSE SERPL-MCNC: 133 MG/DL — HIGH (ref 70–99)
HCT VFR BLD CALC: 43.4 % — SIGNIFICANT CHANGE UP (ref 39–50)
HCT VFR BLD CALC: 43.4 % — SIGNIFICANT CHANGE UP (ref 39–50)
HGB BLD-MCNC: 13.7 G/DL — SIGNIFICANT CHANGE UP (ref 13–17)
HGB BLD-MCNC: 13.7 G/DL — SIGNIFICANT CHANGE UP (ref 13–17)
LG PLATELETS BLD QL AUTO: SLIGHT — SIGNIFICANT CHANGE UP
LG PLATELETS BLD QL AUTO: SLIGHT — SIGNIFICANT CHANGE UP
LIDOCAIN IGE QN: 72 U/L — SIGNIFICANT CHANGE UP (ref 13–75)
LIDOCAIN IGE QN: 72 U/L — SIGNIFICANT CHANGE UP (ref 13–75)
LYMPHOCYTES # BLD AUTO: 0.6 K/UL — LOW (ref 1–3.3)
LYMPHOCYTES # BLD AUTO: 0.6 K/UL — LOW (ref 1–3.3)
LYMPHOCYTES # BLD AUTO: 18 % — SIGNIFICANT CHANGE UP (ref 13–44)
LYMPHOCYTES # BLD AUTO: 18 % — SIGNIFICANT CHANGE UP (ref 13–44)
MANUAL SMEAR VERIFICATION: SIGNIFICANT CHANGE UP
MANUAL SMEAR VERIFICATION: SIGNIFICANT CHANGE UP
MCHC RBC-ENTMCNC: 24 PG — LOW (ref 27–34)
MCHC RBC-ENTMCNC: 24 PG — LOW (ref 27–34)
MCHC RBC-ENTMCNC: 31.6 G/DL — LOW (ref 32–36)
MCHC RBC-ENTMCNC: 31.6 G/DL — LOW (ref 32–36)
MCV RBC AUTO: 75.9 FL — LOW (ref 80–100)
MCV RBC AUTO: 75.9 FL — LOW (ref 80–100)
MICROCYTES BLD QL: SIGNIFICANT CHANGE UP
MICROCYTES BLD QL: SIGNIFICANT CHANGE UP
MONOCYTES # BLD AUTO: 0.13 K/UL — SIGNIFICANT CHANGE UP (ref 0–0.9)
MONOCYTES # BLD AUTO: 0.13 K/UL — SIGNIFICANT CHANGE UP (ref 0–0.9)
MONOCYTES NFR BLD AUTO: 4 % — SIGNIFICANT CHANGE UP (ref 2–14)
MONOCYTES NFR BLD AUTO: 4 % — SIGNIFICANT CHANGE UP (ref 2–14)
NEUTROPHILS # BLD AUTO: 2.24 K/UL — SIGNIFICANT CHANGE UP (ref 1.8–7.4)
NEUTROPHILS # BLD AUTO: 2.24 K/UL — SIGNIFICANT CHANGE UP (ref 1.8–7.4)
NEUTROPHILS NFR BLD AUTO: 67 % — SIGNIFICANT CHANGE UP (ref 43–77)
NEUTROPHILS NFR BLD AUTO: 67 % — SIGNIFICANT CHANGE UP (ref 43–77)
NRBC # BLD: 0 /100 WBCS — SIGNIFICANT CHANGE UP (ref 0–0)
NRBC # BLD: 0 /100 WBCS — SIGNIFICANT CHANGE UP (ref 0–0)
NRBC # BLD: SIGNIFICANT CHANGE UP /100 WBCS (ref 0–0)
NRBC # BLD: SIGNIFICANT CHANGE UP /100 WBCS (ref 0–0)
PLAT MORPH BLD: NORMAL — SIGNIFICANT CHANGE UP
PLAT MORPH BLD: NORMAL — SIGNIFICANT CHANGE UP
PLATELET # BLD AUTO: 339 K/UL — SIGNIFICANT CHANGE UP (ref 150–400)
PLATELET # BLD AUTO: 339 K/UL — SIGNIFICANT CHANGE UP (ref 150–400)
POTASSIUM SERPL-MCNC: 5.3 MMOL/L — SIGNIFICANT CHANGE UP (ref 3.5–5.3)
POTASSIUM SERPL-MCNC: 5.3 MMOL/L — SIGNIFICANT CHANGE UP (ref 3.5–5.3)
POTASSIUM SERPL-SCNC: 5.3 MMOL/L — SIGNIFICANT CHANGE UP (ref 3.5–5.3)
POTASSIUM SERPL-SCNC: 5.3 MMOL/L — SIGNIFICANT CHANGE UP (ref 3.5–5.3)
PROT SERPL-MCNC: 9.4 GM/DL — HIGH (ref 6–8.3)
PROT SERPL-MCNC: 9.4 GM/DL — HIGH (ref 6–8.3)
RBC # BLD: 5.72 M/UL — SIGNIFICANT CHANGE UP (ref 4.2–5.8)
RBC # BLD: 5.72 M/UL — SIGNIFICANT CHANGE UP (ref 4.2–5.8)
RBC # FLD: 21.4 % — HIGH (ref 10.3–14.5)
RBC # FLD: 21.4 % — HIGH (ref 10.3–14.5)
RBC BLD AUTO: NORMAL — SIGNIFICANT CHANGE UP
RBC BLD AUTO: NORMAL — SIGNIFICANT CHANGE UP
SODIUM SERPL-SCNC: 142 MMOL/L — SIGNIFICANT CHANGE UP (ref 135–145)
SODIUM SERPL-SCNC: 142 MMOL/L — SIGNIFICANT CHANGE UP (ref 135–145)
VARIANT LYMPHS # BLD: 10 % — HIGH (ref 0–6)
VARIANT LYMPHS # BLD: 10 % — HIGH (ref 0–6)
WBC # BLD: 3.34 K/UL — LOW (ref 3.8–10.5)
WBC # BLD: 3.34 K/UL — LOW (ref 3.8–10.5)
WBC # FLD AUTO: 3.34 K/UL — LOW (ref 3.8–10.5)
WBC # FLD AUTO: 3.34 K/UL — LOW (ref 3.8–10.5)

## 2024-01-14 PROCEDURE — 93010 ELECTROCARDIOGRAM REPORT: CPT

## 2024-01-14 PROCEDURE — 99284 EMERGENCY DEPT VISIT MOD MDM: CPT

## 2024-01-14 RX ORDER — FAMOTIDINE 10 MG/ML
20 INJECTION INTRAVENOUS ONCE
Refills: 0 | Status: COMPLETED | OUTPATIENT
Start: 2024-01-14 | End: 2024-01-14

## 2024-01-14 RX ORDER — MORPHINE SULFATE 50 MG/1
4 CAPSULE, EXTENDED RELEASE ORAL ONCE
Refills: 0 | Status: DISCONTINUED | OUTPATIENT
Start: 2024-01-14 | End: 2024-01-14

## 2024-01-14 RX ORDER — ONDANSETRON 8 MG/1
4 TABLET, FILM COATED ORAL ONCE
Refills: 0 | Status: COMPLETED | OUTPATIENT
Start: 2024-01-14 | End: 2024-01-14

## 2024-01-14 RX ORDER — ACETAMINOPHEN 500 MG
2 TABLET ORAL
Qty: 40 | Refills: 0
Start: 2024-01-14 | End: 2024-01-18

## 2024-01-14 RX ORDER — SODIUM CHLORIDE 9 MG/ML
1000 INJECTION INTRAMUSCULAR; INTRAVENOUS; SUBCUTANEOUS ONCE
Refills: 0 | Status: COMPLETED | OUTPATIENT
Start: 2024-01-14 | End: 2024-01-14

## 2024-01-14 RX ORDER — KETOROLAC TROMETHAMINE 30 MG/ML
15 SYRINGE (ML) INJECTION ONCE
Refills: 0 | Status: DISCONTINUED | OUTPATIENT
Start: 2024-01-14 | End: 2024-01-14

## 2024-01-14 RX ADMIN — SODIUM CHLORIDE 1000 MILLILITER(S): 9 INJECTION INTRAMUSCULAR; INTRAVENOUS; SUBCUTANEOUS at 14:24

## 2024-01-14 RX ADMIN — SODIUM CHLORIDE 1000 MILLILITER(S): 9 INJECTION INTRAMUSCULAR; INTRAVENOUS; SUBCUTANEOUS at 15:14

## 2024-01-14 RX ADMIN — Medication 50 MILLIGRAM(S): at 14:24

## 2024-01-14 RX ADMIN — MORPHINE SULFATE 4 MILLIGRAM(S): 50 CAPSULE, EXTENDED RELEASE ORAL at 19:50

## 2024-01-14 RX ADMIN — Medication 1 MILLIGRAM(S): at 18:16

## 2024-01-14 RX ADMIN — Medication 15 MILLIGRAM(S): at 13:44

## 2024-01-14 RX ADMIN — FAMOTIDINE 20 MILLIGRAM(S): 10 INJECTION INTRAVENOUS at 12:58

## 2024-01-14 RX ADMIN — SODIUM CHLORIDE 1000 MILLILITER(S): 9 INJECTION INTRAMUSCULAR; INTRAVENOUS; SUBCUTANEOUS at 12:58

## 2024-01-14 RX ADMIN — ONDANSETRON 4 MILLIGRAM(S): 8 TABLET, FILM COATED ORAL at 12:58

## 2024-01-14 RX ADMIN — Medication 15 MILLIGRAM(S): at 18:16

## 2024-01-14 RX ADMIN — ONDANSETRON 4 MILLIGRAM(S): 8 TABLET, FILM COATED ORAL at 18:16

## 2024-01-14 RX ADMIN — Medication 15 MILLIGRAM(S): at 18:58

## 2024-01-14 RX ADMIN — Medication 2 MILLIGRAM(S): at 12:58

## 2024-01-14 RX ADMIN — Medication 15 MILLIGRAM(S): at 15:14

## 2024-01-14 RX ADMIN — Medication 30 MILLILITER(S): at 19:24

## 2024-01-14 RX ADMIN — SODIUM CHLORIDE 1000 MILLILITER(S): 9 INJECTION INTRAMUSCULAR; INTRAVENOUS; SUBCUTANEOUS at 18:09

## 2024-01-14 NOTE — ED PROVIDER NOTE - ATTENDING APP SHARED VISIT CONTRIBUTION OF CARE
58M pmhx hld, PUD, alcohol abuse, who presents with diffuse myalgias/ body aches, and epigastric abd pain and vomiting, reportedly ongoing x few days. Denies falls/ head injury or fevers. Denies active abd pain. Last drink yday.   Pt on eval aox3, nad, heart mild tachycardia without murmurs, abd soft ntnd, lungs cta b/l, no joint swelling, normal ROM of all extremities, no focal area of tenderness, no tongue fasciculations or distal tremors   plan - analgesia, IV fluids, likely dehydration, likely alcohol induced gastritis, eval labs for metabolic derangements, abd nontender, afebrile, benzo to prevent etoh withdrawal   - pt on serial reassessments resting comfortably, abd nontender, return precautions discussed, stable for outpt follow up, alcohol cessation counseling and detox discussed, pt not ready to quit alcohol

## 2024-01-14 NOTE — ED PROVIDER NOTE - PATIENT PORTAL LINK FT
You can access the FollowMyHealth Patient Portal offered by [facility] by registering at the following website: [email or web address]. By joining [employer]’s FollowMyHealth portal, you will also be able to view your health information using other applications (apps) compatible with our system. You can access the FollowMyHealth Patient Portal offered by [facility] by registering at the following website: [email or web address]. By joining [employer]’s FollowMyHealth portal, you will also be able to view your health information using other applications (apps) compatible with our system. You can access the FollowMyHealth Patient Portal offered by [facility] by registering at the following website: [email or web address]. By joining [employer]’s FollowMyHealth portal, you will also be able to view your health information using other applications (apps) compatible with our system. You can access the FollowMyHealth Patient Portal offered by [facility] by registering at the following website: [email or web address]. By joining [employer]’s FollowMyHealth portal, you will also be able to view your health information using other applications (apps) compatible with our system. You can access the FollowMyHealth Patient Portal offered by [facility] by registering at the following website: [email or web address]. By joining [employer]’s FollowMyHealth portal, you will also be able to view your health information using other applications (apps) compatible with our system. You can access the FollowMyHealth Patient Portal offered by [facility] by registering at the following website: [email or web address]. By joining [employer]’s FollowMyHealth portal, you will also be able to view your health information using other applications (apps) compatible with our system. You can access the FollowMyHealth Patient Portal offered by Health system by registering at the following website: [email or web address]. By joining [employer]’s FollowMyHealth portal, you will also be able to view your health information using other applications (apps) compatible with our system. You can access the FollowMyHealth Patient Portal offered by [facility] by registering at the following website: [email or web address]. By joining [employer]’s FollowMyHealth portal, you will also be able to view your health information using other applications (apps) compatible with our system.

## 2024-01-14 NOTE — ED ADULT NURSE NOTE - OBJECTIVE STATEMENT
58 year old male A/Ox2 c/o abdominal pain, pt c/o n/v, pt denies fever/chills, urinary and bowel dysfunction, pt reports last drink was yesterday, pt reports to drinking vodka, unable to remember how much, pt denies chest pain, headache, dizziness, lightheadedness, pt placed on cardiac monitor

## 2024-01-14 NOTE — ED PROVIDER NOTE - PHYSICAL EXAMINATION
PHYSICAL EXAM:    GENERAL: Alert, appears stated age, well appearing, non-toxic  SKIN: Warm, and dry.   HEAD: NC, AT  EYE: Normal lids/conjunctiva  ENT: Normal hearing, patent oropharynx   NECK: +supple. No meningismus, or JVD  Pulm: Bilateral BS, normal resp effort, no wheezes, stridor, or retractions  CV: RRR, no M/R/G, 2+and = radial pulses  Abd: soft, non-distended, no rebound/guarding. no ruq/rlq/llq/luq ttp. +mild epigastric ttp. no CVA tenderness.   Mskel: no erythema, cyanosis, edema. no calf tenderness  Neuro: AAOx3

## 2024-01-14 NOTE — ED ADULT NURSE NOTE - NSFALLRISKINTERV_ED_ALL_ED
Assistance OOB with selected safe patient handling equipment if applicable/Assistance with ambulation/Communicate fall risk and risk factors to all staff, patient, and family/Monitor gait and stability/Monitor for mental status changes and reorient to person, place, and time, as needed/Provide visual cue: yellow wristband, yellow gown, etc/Reinforce activity limits and safety measures with patient and family/Toileting schedule using arm’s reach rule for commode and bathroom/Use of alarms - bed, stretcher, chair and/or video monitoring/Call bell, personal items and telephone in reach/Instruct patient to call for assistance before getting out of bed/chair/stretcher/Non-slip footwear applied when patient is off stretcher/[place] to call system/Physically safe environment - no spills, clutter or unnecessary equipment/Purposeful Proactive Rounding/Room/bathroom lighting operational, light cord in reach Assistance OOB with selected safe patient handling equipment if applicable/Assistance with ambulation/Communicate fall risk and risk factors to all staff, patient, and family/Monitor gait and stability/Monitor for mental status changes and reorient to person, place, and time, as needed/Provide visual cue: yellow wristband, yellow gown, etc/Reinforce activity limits and safety measures with patient and family/Toileting schedule using arm’s reach rule for commode and bathroom/Use of alarms - bed, stretcher, chair and/or video monitoring/Call bell, personal items and telephone in reach/Instruct patient to call for assistance before getting out of bed/chair/stretcher/Non-slip footwear applied when patient is off stretcher/[place] to call system/Physically safe environment - no spills, clutter or unnecessary equipment/Purposeful Proactive Rounding/Room/bathroom lighting operational, light cord in reach

## 2024-01-14 NOTE — ED ADULT TRIAGE NOTE - CHIEF COMPLAINT QUOTE
BIBA for abdominal pain, nausea and vomiting today. Last alcohol intake today. Denies chest pain, dizziness or shortness of breath.

## 2024-01-14 NOTE — ED PROVIDER NOTE - CLINICAL SUMMARY MEDICAL DECISION MAKING FREE TEXT BOX
+n/v/abd pain  with hx multiple similar episodes   ddx: alcoholic gastritis, pancreatitis, biliary pathology   tolerating po.  ambulatory with steady gait and clear speech

## 2024-01-14 NOTE — ED PROVIDER NOTE - COVID-19 ORDERING FACILITY
[facility]

## 2024-01-14 NOTE — ED PROVIDER NOTE - OBJECTIVE STATEMENT
58-year-old male with PMH HLD, peptic ulcer disease, alcohol abuse, multiple hospitalizations in the past presents with epigastric pain, nausea multiple episodes of nonbloody nonbilious vomiting x today.  no palliating/provoking factors.   He relates last drink was a few days ago.  No fever, lower abdominal pain, diarrhea, chest pain, shortness of breath, rash, history of abdominal surgeries

## 2024-01-18 ENCOUNTER — INPATIENT (INPATIENT)
Facility: HOSPITAL | Age: 59
LOS: 13 days | Discharge: HOME | End: 2024-02-01
Attending: STUDENT IN AN ORGANIZED HEALTH CARE EDUCATION/TRAINING PROGRAM | Admitting: STUDENT IN AN ORGANIZED HEALTH CARE EDUCATION/TRAINING PROGRAM
Payer: MEDICAID

## 2024-01-18 VITALS
HEIGHT: 66 IN | TEMPERATURE: 98 F | OXYGEN SATURATION: 99 % | WEIGHT: 175.05 LBS | DIASTOLIC BLOOD PRESSURE: 93 MMHG | RESPIRATION RATE: 18 BRPM | HEART RATE: 147 BPM | SYSTOLIC BLOOD PRESSURE: 137 MMHG

## 2024-01-18 DIAGNOSIS — D69.6 THROMBOCYTOPENIA, UNSPECIFIED: ICD-10-CM

## 2024-01-18 DIAGNOSIS — F10.230 ALCOHOL DEPENDENCE WITH WITHDRAWAL, UNCOMPLICATED: ICD-10-CM

## 2024-01-18 DIAGNOSIS — K72.00 ACUTE AND SUBACUTE HEPATIC FAILURE WITHOUT COMA: ICD-10-CM

## 2024-01-18 DIAGNOSIS — R74.01 ELEVATION OF LEVELS OF LIVER TRANSAMINASE LEVELS: ICD-10-CM

## 2024-01-18 DIAGNOSIS — N17.9 ACUTE KIDNEY FAILURE, UNSPECIFIED: ICD-10-CM

## 2024-01-18 DIAGNOSIS — K85.90 ACUTE PANCREATITIS WITHOUT NECROSIS OR INFECTION, UNSPECIFIED: ICD-10-CM

## 2024-01-18 DIAGNOSIS — I10 ESSENTIAL (PRIMARY) HYPERTENSION: ICD-10-CM

## 2024-01-18 DIAGNOSIS — E44.0 MODERATE PROTEIN-CALORIE MALNUTRITION: ICD-10-CM

## 2024-01-18 DIAGNOSIS — Z11.52 ENCOUNTER FOR SCREENING FOR COVID-19: ICD-10-CM
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
## 2024-01-18 DIAGNOSIS — D64.9 ANEMIA, UNSPECIFIED: ICD-10-CM

## 2024-01-18 DIAGNOSIS — K27.9 PEPTIC ULCER, SITE UNSPECIFIED, UNSPECIFIED AS ACUTE OR CHRONIC, WITHOUT HEMORRHAGE OR PERFORATION: ICD-10-CM

## 2024-01-18 DIAGNOSIS — K21.9 GASTRO-ESOPHAGEAL REFLUX DISEASE WITHOUT ESOPHAGITIS: ICD-10-CM

## 2024-01-18 DIAGNOSIS — K85.20 ALCOHOL INDUCED ACUTE PANCREATITIS WITHOUT NECROSIS OR INFECTION: ICD-10-CM

## 2024-01-18 DIAGNOSIS — E78.5 HYPERLIPIDEMIA, UNSPECIFIED: ICD-10-CM

## 2024-01-18 LAB
ALBUMIN SERPL ELPH-MCNC: 4.2 G/DL — SIGNIFICANT CHANGE UP (ref 3.3–5)
ALP SERPL-CCNC: 79 U/L — SIGNIFICANT CHANGE UP (ref 40–120)
ALT FLD-CCNC: 200 U/L — HIGH (ref 12–78)
ANION GAP SERPL CALC-SCNC: 16 MMOL/L — SIGNIFICANT CHANGE UP (ref 5–17)
APPEARANCE UR: CLEAR — SIGNIFICANT CHANGE UP
AST SERPL-CCNC: 261 U/L — HIGH (ref 15–37)
BACTERIA # UR AUTO: ABNORMAL /HPF
BASOPHILS # BLD AUTO: 0.03 K/UL — SIGNIFICANT CHANGE UP (ref 0–0.2)
BASOPHILS NFR BLD AUTO: 0.4 % — SIGNIFICANT CHANGE UP (ref 0–2)
BILIRUB SERPL-MCNC: 1 MG/DL — SIGNIFICANT CHANGE UP (ref 0.2–1.2)
BILIRUB UR-MCNC: NEGATIVE — SIGNIFICANT CHANGE UP
BUN SERPL-MCNC: 10 MG/DL — SIGNIFICANT CHANGE UP (ref 7–23)
CALCIUM SERPL-MCNC: 9.8 MG/DL — SIGNIFICANT CHANGE UP (ref 8.5–10.1)
CHLORIDE SERPL-SCNC: 103 MMOL/L — SIGNIFICANT CHANGE UP (ref 96–108)
CO2 SERPL-SCNC: 20 MMOL/L — LOW (ref 22–31)
COLOR SPEC: YELLOW — SIGNIFICANT CHANGE UP
CREAT SERPL-MCNC: 1.45 MG/DL — HIGH (ref 0.5–1.3)
DIFF PNL FLD: NEGATIVE — SIGNIFICANT CHANGE UP
EGFR: 57 ML/MIN/1.73M2 — LOW
EOSINOPHIL # BLD AUTO: 0.02 K/UL — SIGNIFICANT CHANGE UP (ref 0–0.5)
EOSINOPHIL NFR BLD AUTO: 0.3 % — SIGNIFICANT CHANGE UP (ref 0–6)
EPI CELLS # UR: PRESENT
ETHANOL SERPL-MCNC: <10 MG/DL — SIGNIFICANT CHANGE UP (ref 0–10)
GLUCOSE SERPL-MCNC: 129 MG/DL — HIGH (ref 70–99)
GLUCOSE UR QL: NEGATIVE MG/DL — SIGNIFICANT CHANGE UP
HCT VFR BLD CALC: 40.8 % — SIGNIFICANT CHANGE UP (ref 39–50)
HGB BLD-MCNC: 12.6 G/DL — LOW (ref 13–17)
IMM GRANULOCYTES NFR BLD AUTO: 0.4 % — SIGNIFICANT CHANGE UP (ref 0–0.9)
KETONES UR-MCNC: 15 MG/DL
LEUKOCYTE ESTERASE UR-ACNC: NEGATIVE — SIGNIFICANT CHANGE UP
LIDOCAIN IGE QN: 113 U/L — HIGH (ref 13–75)
LYMPHOCYTES # BLD AUTO: 1.42 K/UL — SIGNIFICANT CHANGE UP (ref 1–3.3)
LYMPHOCYTES # BLD AUTO: 18.6 % — SIGNIFICANT CHANGE UP (ref 13–44)
MCHC RBC-ENTMCNC: 24 PG — LOW (ref 27–34)
MCHC RBC-ENTMCNC: 30.9 G/DL — LOW (ref 32–36)
MCV RBC AUTO: 77.9 FL — LOW (ref 80–100)
MONOCYTES # BLD AUTO: 0.58 K/UL — SIGNIFICANT CHANGE UP (ref 0–0.9)
MONOCYTES NFR BLD AUTO: 7.6 % — SIGNIFICANT CHANGE UP (ref 2–14)
NEUTROPHILS # BLD AUTO: 5.56 K/UL — SIGNIFICANT CHANGE UP (ref 1.8–7.4)
NEUTROPHILS NFR BLD AUTO: 72.7 % — SIGNIFICANT CHANGE UP (ref 43–77)
NITRITE UR-MCNC: NEGATIVE — SIGNIFICANT CHANGE UP
NRBC # BLD: 0 /100 WBCS — SIGNIFICANT CHANGE UP (ref 0–0)
PH UR: 8 — SIGNIFICANT CHANGE UP (ref 5–8)
PLATELET # BLD AUTO: 241 K/UL — SIGNIFICANT CHANGE UP (ref 150–400)
POTASSIUM SERPL-MCNC: 3.7 MMOL/L — SIGNIFICANT CHANGE UP (ref 3.5–5.3)
POTASSIUM SERPL-SCNC: 3.7 MMOL/L — SIGNIFICANT CHANGE UP (ref 3.5–5.3)
PROT SERPL-MCNC: 8.6 GM/DL — HIGH (ref 6–8.3)
PROT UR-MCNC: 100 MG/DL
RBC # BLD: 5.24 M/UL — SIGNIFICANT CHANGE UP (ref 4.2–5.8)
RBC # FLD: 21.1 % — HIGH (ref 10.3–14.5)
RBC CASTS # UR COMP ASSIST: SIGNIFICANT CHANGE UP /HPF (ref 0–4)
SODIUM SERPL-SCNC: 139 MMOL/L — SIGNIFICANT CHANGE UP (ref 135–145)
SP GR SPEC: 1.02 — SIGNIFICANT CHANGE UP (ref 1–1.03)
TROPONIN I, HIGH SENSITIVITY RESULT: 9.2 NG/L — SIGNIFICANT CHANGE UP
UROBILINOGEN FLD QL: 1 MG/DL — SIGNIFICANT CHANGE UP (ref 0.2–1)
WBC # BLD: 7.64 K/UL — SIGNIFICANT CHANGE UP (ref 3.8–10.5)
WBC # FLD AUTO: 7.64 K/UL — SIGNIFICANT CHANGE UP (ref 3.8–10.5)
WBC UR QL: SIGNIFICANT CHANGE UP /HPF (ref 0–5)

## 2024-01-18 PROCEDURE — 93010 ELECTROCARDIOGRAM REPORT: CPT

## 2024-01-18 PROCEDURE — 99222 1ST HOSP IP/OBS MODERATE 55: CPT

## 2024-01-18 PROCEDURE — 99285 EMERGENCY DEPT VISIT HI MDM: CPT

## 2024-01-18 RX ORDER — PANTOPRAZOLE SODIUM 20 MG/1
40 TABLET, DELAYED RELEASE ORAL
Refills: 0 | Status: DISCONTINUED | OUTPATIENT
Start: 2024-01-18 | End: 2024-02-01

## 2024-01-18 RX ORDER — FAMOTIDINE 10 MG/ML
20 INJECTION INTRAVENOUS
Refills: 0 | Status: DISCONTINUED | OUTPATIENT
Start: 2024-01-18 | End: 2024-01-19

## 2024-01-18 RX ORDER — KETOROLAC TROMETHAMINE 30 MG/ML
15 SYRINGE (ML) INJECTION ONCE
Refills: 0 | Status: DISCONTINUED | OUTPATIENT
Start: 2024-01-18 | End: 2024-01-18

## 2024-01-18 RX ORDER — SODIUM CHLORIDE 9 MG/ML
1000 INJECTION INTRAMUSCULAR; INTRAVENOUS; SUBCUTANEOUS ONCE
Refills: 0 | Status: COMPLETED | OUTPATIENT
Start: 2024-01-18 | End: 2024-01-18

## 2024-01-18 RX ORDER — LANOLIN ALCOHOL/MO/W.PET/CERES
3 CREAM (GRAM) TOPICAL AT BEDTIME
Refills: 0 | Status: DISCONTINUED | OUTPATIENT
Start: 2024-01-18 | End: 2024-02-01

## 2024-01-18 RX ORDER — ONDANSETRON 8 MG/1
4 TABLET, FILM COATED ORAL EVERY 8 HOURS
Refills: 0 | Status: DISCONTINUED | OUTPATIENT
Start: 2024-01-18 | End: 2024-02-01

## 2024-01-18 RX ORDER — ACETAMINOPHEN 500 MG
1000 TABLET ORAL ONCE
Refills: 0 | Status: COMPLETED | OUTPATIENT
Start: 2024-01-18 | End: 2024-01-18

## 2024-01-18 RX ORDER — FAMOTIDINE 10 MG/ML
20 INJECTION INTRAVENOUS ONCE
Refills: 0 | Status: COMPLETED | OUTPATIENT
Start: 2024-01-18 | End: 2024-01-18

## 2024-01-18 RX ORDER — SUCRALFATE 1 G
1 TABLET ORAL
Refills: 0 | Status: DISCONTINUED | OUTPATIENT
Start: 2024-01-18 | End: 2024-02-01

## 2024-01-18 RX ORDER — ATORVASTATIN CALCIUM 80 MG/1
20 TABLET, FILM COATED ORAL AT BEDTIME
Refills: 0 | Status: DISCONTINUED | OUTPATIENT
Start: 2024-01-18 | End: 2024-02-01

## 2024-01-18 RX ORDER — THIAMINE MONONITRATE (VIT B1) 100 MG
100 TABLET ORAL DAILY
Refills: 0 | Status: COMPLETED | OUTPATIENT
Start: 2024-01-18 | End: 2024-01-21

## 2024-01-18 RX ORDER — FOLIC ACID 0.8 MG
1 TABLET ORAL DAILY
Refills: 0 | Status: DISCONTINUED | OUTPATIENT
Start: 2024-01-18 | End: 2024-01-29

## 2024-01-18 RX ORDER — ONDANSETRON 8 MG/1
4 TABLET, FILM COATED ORAL ONCE
Refills: 0 | Status: COMPLETED | OUTPATIENT
Start: 2024-01-18 | End: 2024-01-18

## 2024-01-18 RX ORDER — PANTOPRAZOLE SODIUM 20 MG/1
40 TABLET, DELAYED RELEASE ORAL ONCE
Refills: 0 | Status: COMPLETED | OUTPATIENT
Start: 2024-01-18 | End: 2024-01-18

## 2024-01-18 RX ORDER — ACETAMINOPHEN 500 MG
650 TABLET ORAL EVERY 6 HOURS
Refills: 0 | Status: DISCONTINUED | OUTPATIENT
Start: 2024-01-18 | End: 2024-02-01

## 2024-01-18 RX ADMIN — Medication 15 MILLIGRAM(S): at 23:36

## 2024-01-18 RX ADMIN — Medication 100 MILLIGRAM(S): at 23:36

## 2024-01-18 RX ADMIN — Medication 1 TABLET(S): at 23:36

## 2024-01-18 RX ADMIN — Medication 1 MILLIGRAM(S): at 23:36

## 2024-01-18 RX ADMIN — SODIUM CHLORIDE 1000 MILLILITER(S): 9 INJECTION INTRAMUSCULAR; INTRAVENOUS; SUBCUTANEOUS at 20:31

## 2024-01-18 RX ADMIN — ONDANSETRON 4 MILLIGRAM(S): 8 TABLET, FILM COATED ORAL at 20:31

## 2024-01-18 RX ADMIN — PANTOPRAZOLE SODIUM 40 MILLIGRAM(S): 20 TABLET, DELAYED RELEASE ORAL at 23:54

## 2024-01-18 RX ADMIN — SODIUM CHLORIDE 1000 MILLILITER(S): 9 INJECTION INTRAMUSCULAR; INTRAVENOUS; SUBCUTANEOUS at 21:30

## 2024-01-18 RX ADMIN — PANTOPRAZOLE SODIUM 40 MILLIGRAM(S): 20 TABLET, DELAYED RELEASE ORAL at 23:36

## 2024-01-18 RX ADMIN — Medication 400 MILLIGRAM(S): at 21:30

## 2024-01-18 RX ADMIN — Medication 50 MILLIGRAM(S): at 23:37

## 2024-01-18 RX ADMIN — FAMOTIDINE 20 MILLIGRAM(S): 10 INJECTION INTRAVENOUS at 23:36

## 2024-01-18 RX ADMIN — FAMOTIDINE 20 MILLIGRAM(S): 10 INJECTION INTRAVENOUS at 20:31

## 2024-01-18 RX ADMIN — ATORVASTATIN CALCIUM 20 MILLIGRAM(S): 80 TABLET, FILM COATED ORAL at 23:37

## 2024-01-18 RX ADMIN — Medication 2 MILLIGRAM(S): at 20:30

## 2024-01-18 NOTE — ED PROVIDER NOTE - PHYSICAL EXAMINATION
GEN: Awake, alert, interactive, NAD.  HEAD AND NECK: NC/AT. Airway patent. Neck supple.   EYES:  Clear b/l. EOMI. PERRL.   ENT: Moist mucus membranes.   CARDIAC: Tachycardic, regular rhythm. No evident pedal edema.    RESP/CHEST: Normal respiratory effort with no use of accessory muscles or retractions. Clear throughout on auscultation.  ABD: Soft, non-distended, non-tender. No rebound, no guarding.   BACK: No midline spinal TTP. No CVAT.   EXTREMITIES: Moving all extremities with no apparent deformities.   SKIN: Warm, diaphoretic, intact normal color. No rash.   NEURO: AOx3, CN II-XII grossly intact, no focal deficits.   PSYCH: Appropriate mood and affect.

## 2024-01-18 NOTE — ED ADULT TRIAGE NOTE - CHIEF COMPLAINT QUOTE
bibems for abd pain and vomiting since today.  pt denies alcohol consumption today.  Last drink 3-4 days ago.  Pt noted to be shaking in triage.   hx of ETOH abuse bibems for abd pain and vomiting since today.  pt denies alcohol consumption today.  Last drink 3-4 days ago.  Pt noted to be shaking in triage.  fs 132  hx of ETOH abuse

## 2024-01-18 NOTE — H&P ADULT - NSICDXNOFAMILYHX_GEN_ALL_CORE
01/18/24 0830 01/18/24 0835 01/18/24 0840   Vital Signs   BP (!) 123/58 (!) 108/54 (!) 100/54   MAP (mmHg) 83 75 74   Patient Position Lying Sitting Standing     Orthostatic BP, MD Chuckie made aware.   <-- Click to add NO pertinent Family History

## 2024-01-18 NOTE — ED ADULT TRIAGE NOTE - TEMPERATURE IN FAHRENHEIT (DEGREES F)
Baseline 155  Moderate variability  Accelerations present 15 x 15  Decelerations absent  Tocos irregular  Reactive NST   98.2

## 2024-01-18 NOTE — ED ADULT NURSE NOTE - OBJECTIVE STATEMENT
Pt presents a&ox4 c/o nausea, vomiting, abdominal pain since earlier today. PMH etoh and gerd. Pt admits last drink was "either three or four days ago." Pain demanding "Give me pain medicine, I need it. Now" Pt placed on cardiac monitor, sinus tachycardia. Tremors present, anxiety, agitation, nausea, vomiting, and headache. Respirations are even and unlabored on room air.

## 2024-01-18 NOTE — ED PROVIDER NOTE - OBJECTIVE STATEMENT
58M PMH HTN, EtOH abuse BIBEMS d/t N/V onset this AM a/w h/a, abd pain. Pt states has not drank alcohol since hospital d/c 4 days ago. Denies F/C, dizziness, CP, SOB, cough, diarrhea / constipation, UTI sx, LE pain / swelling. Unable to tolerate PO intake.     PMH as above, PSH none, NKDA, meds as listed.

## 2024-01-18 NOTE — H&P ADULT - NSHPLABSRESULTS_GEN_ALL_CORE
12.6   7.64  )-----------( 241      ( 2024 20:20 )             40.8       139  |  103  |  10  ----------------------------<  129<H>  3.7   |  20<L>  |  1.45<H>    Ca    9.8      2024 20:20    TPro  8.6<H>  /  Alb  4.2  /  TBili  1.0  /  DBili  x   /  AST  261<H>  /  ALT  200<H>  /  AlkPhos  79      Urinalysis Basic - ( 2024 21:35 )    Color: Yellow / Appearance: Clear / S.019 / pH: x  Gluc: x / Ketone: 15 mg/dL  / Bili: Negative / Urobili: 1.0 mg/dL   Blood: x / Protein: 100 mg/dL / Nitrite: Negative   Leuk Esterase: Negative / RBC: 3-4 /HPF / WBC 0-3 /HPF   Sq Epi: x / Non Sq Epi: x / Bacteria: Few /HPF

## 2024-01-18 NOTE — H&P ADULT - NSHPPHYSICALEXAM_GEN_ALL_CORE
T(C): 36.8 (01-18-24 @ 19:30), Max: 36.8 (01-18-24 @ 19:30)  HR: 83 (01-18-24 @ 22:49) (83 - 147)  BP: 152/92 (01-18-24 @ 22:49) (137/93 - 152/92)  RR: 15 (01-18-24 @ 22:49) (15 - 18)  SpO2: 99% (01-18-24 @ 22:49) (99% - 99%)    CONSTITUTIONAL: Well groomed, no apparent distress  EYES: PERRLA and symmetric, EOMI  ENMT: Oral mucosa with moist membranes  RESP: No respiratory distress, no use of accessory muscles; CTA b/l  CV: tachycardic  GI: Soft, ND, mildly tender to palpation in epigastric region  NEURO: b/l hand tremors appreciated

## 2024-01-18 NOTE — ED ADULT NURSE NOTE - ED STAT RN HANDOFF DETAILS
Report endorsed to Brandy OLSEN. Safety checks compld this shift.  IV sites checked and remains WDL. Meds given as ord with no s/s of adverse RXNs. Fall +skin precs in place. Any issues endorsed to oncoming RN for follow up.

## 2024-01-18 NOTE — ED PROVIDER NOTE - CLINICAL SUMMARY MEDICAL DECISION MAKING FREE TEXT BOX
58M PMH HTN, EtOH abuse BIBEMS d/t N/V, h/a, abd pain onset this AM in setting of w/o alcohol x4 days. Afebrile. + tachycardic to 150s, tachypneic to 40s, + diaphoretic & w/ BUE tremors. CIWA 24. Plan for Ativan, Pepcid, Zofran, IVF. CBC, CMP, lipase, trop, TRINITY, UA/C, ECG. Re-eval. 58M PMH HTN, EtOH abuse BIBEMS d/t N/V, h/a, abd pain onset this AM in setting of w/o alcohol x4 days. Afebrile. + tachycardic to 150s, tachypneic to 40s, + diaphoretic & w/ BUE tremors. CIWA 24. Plan for Ativan, Pepcid, Zofran, Tylenol, IVF. CBC, CMP, lipase, trop, TRINITY, UA/C, ECG. Re-eval.  W/u w/o significant abnormalities other than Cr 1.45 (c/w prior value) & mild transaminitis, lipase 113. TRINITY negative. Will admit to Tele (d/w Dr Fitzgerald). Pt updated to results, admission. He understands / agrees w/ this plan.

## 2024-01-18 NOTE — ED ADULT NURSE NOTE - CHIEF COMPLAINT QUOTE
bibems for abd pain and vomiting since today.  pt denies alcohol consumption today.  Last drink 3-4 days ago.  Pt noted to be shaking in triage.  fs 132  hx of ETOH abuse

## 2024-01-18 NOTE — H&P ADULT - ASSESSMENT
Stefan Degroot is a 58 year old male with PMHx of EtOH abuse and GERD secondary to gastritis who presented to the ED on 1/18/24 for complaints of epigastric pain and tremors and admitted for alcohol dependence with high risk for withdrawal.     EtOH dependence with high risk for withdrawal  Complaints of epigastric pain x few days and b/l hand tremors x 2 days  Drinks 1 glass of whiskey on most days x 35 years, last drink was 3 days ago  Blood alcohol level negative on admission   S/p 2L NS bolus, acetaminophen 1 g IV, and ketorolac 15 mg IV, lorazepam 2 mg IV, and ondansetron 4 mg IV in the ED  Seizure and aspiration precautions  PTA thiamine 100 mg, folic acid 1 mg, and multivitamins continued  CIWA protocol, librium taper  Counseled on the importance of alcohol cessation     HR as elevated as 147. Hgb 12.6, bicarb 20, Cr 1.45, , , lipase 113. Blood alcohol level negative. U/A not suggestive of infection. Received 2L NS bolus, acetaminophen 1 g IV, and ketorolac 15 mg IV, lorazepam 2 mg IV, and ondansetron 4 mg IV.      Transaminitis, suspect secondary to EtOH abuse   and  on admission  Avoid hepatotoxic agents  Trend LFTs        Chronic medical conditions:  GERD secondary to gastritis: PTA pantoprazole 40 mg and famotidine 20 mg BID, sucralfate 1 g QID added  Microcytic anemia, appears chronic: hgb 12.6 on admission, appears around baseline, no signs of bleeding, monitor   Stefan Degroot is a 58 year old male with PMHx of EtOH abuse and GERD secondary to gastritis who presented to the ED on 1/18/24 for complaints of epigastric pain and hand tremors and admitted for alcohol dependence with high risk for withdrawal and ROSA.     EtOH dependence with high risk for withdrawal  Complaints of epigastric pain x few days and b/l hand tremors x 2 days  Drinks 1 glass of whiskey on most days x 35 years, last drink was 3 days ago  Lipase 113, blood alcohol level negative on admission, U/A not suggestive of infection  S/p 2L NS bolus, acetaminophen 1 g IV, and ketorolac 15 mg IV, lorazepam 2 mg IV, and ondansetron 4 mg IV in the ED  Seizure and aspiration precautions  PTA thiamine 100 mg, folic acid 1 mg, and multivitamins continued  CIWA protocol, librium taper  Counseled on the importance of alcohol cessation  Can consider CT A/P if no improvement of epigastric pain given elevated lipase level    ROSA, suspect etiology prerenal due to GI losses  NAGMA secondary to above  Bicarb 20 and Cr 1.45 on admission, baseline ~0.9  S/p 2L NS bolus in the ED  Avoid nephrotoxins, renally dose meds  Encourage PO hydration  Monitor renal function    Transaminitis, suspect secondary to EtOH abuse   and  on admission  Avoid hepatotoxic agents  Trend LFTs        Chronic medical conditions:  GERD secondary to gastritis: PTA pantoprazole 40 mg and famotidine 20 mg BID, sucralfate 1 g QID added  Microcytic anemia, appears chronic: hgb 12.6 on admission, appears around baseline, no signs of bleeding, monitor   Stefan Degroot is a 58 year old male with PMHx of EtOH abuse and GERD secondary to gastritis who presented to the ED on 1/18/24 for complaints of epigastric pain and hand tremors and admitted for alcohol dependence with high risk for withdrawal and ROSA.     EtOH dependence with high risk for withdrawal  Complaints of epigastric pain x few days and b/l hand tremors x 2 days  Drinks 1 glass of whiskey on most days x 35 years, last drink was 3 days ago  Lipase 113, blood alcohol level negative on admission, U/A not suggestive of infection  S/p 2L NS bolus, acetaminophen 1 g IV, and ketorolac 15 mg IV, lorazepam 2 mg IV, and ondansetron 4 mg IV in the ED  Seizure and aspiration precautions  PTA thiamine 100 mg, folic acid 1 mg, and multivitamins continued  CIWA protocol, librium taper  Counseled on the importance of alcohol cessation  Can consider CT A/P if no improvement of epigastric pain given elevated lipase level    ROSA, suspect etiology prerenal secondary to GI losses  NAGMA secondary to above  Bicarb 20 and Cr 1.45 on admission, baseline Cr ~0.9  S/p 2L NS bolus in the ED  Avoid nephrotoxins, renally dose meds  Encourage PO hydration  Monitor renal function    Transaminitis, suspect secondary to EtOH abuse   and  on admission  Avoid hepatotoxic agents  Trend LFTs        Chronic medical conditions:  GERD secondary to gastritis: PTA pantoprazole 40 mg and famotidine 20 mg BID, sucralfate 1 g QID added  Microcytic anemia, appears chronic: hgb 12.6 on admission, appears around baseline, no signs of bleeding, monitor

## 2024-01-18 NOTE — ED ADULT NURSE NOTE - NSFALLRISKINTERV_ED_ALL_ED
Assistance OOB with selected safe patient handling equipment if applicable/Assistance with ambulation/Communicate fall risk and risk factors to all staff, patient, and family/Monitor gait and stability/Monitor for mental status changes and reorient to person, place, and time, as needed/Provide visual cue: yellow wristband, yellow gown, etc/Reinforce activity limits and safety measures with patient and family/Toileting schedule using arm’s reach rule for commode and bathroom/Use of alarms - bed, stretcher, chair and/or video monitoring/Call bell, personal items and telephone in reach/Instruct patient to call for assistance before getting out of bed/chair/stretcher/Non-slip footwear applied when patient is off stretcher/[place] to call system/Physically safe environment - no spills, clutter or unnecessary equipment/Purposeful Proactive Rounding/Room/bathroom lighting operational, light cord in reach

## 2024-01-18 NOTE — H&P ADULT - REASON FOR ADMISSION
Alcohol dependence with high risk for withdrawal Alcohol dependence with high risk for withdrawal, ROSA

## 2024-01-18 NOTE — H&P ADULT - HISTORY OF PRESENT ILLNESS
Stefan Degroot is a 58 year old male with PMHx of EtOH abuse and GERD secondary to gastritis who presented to the ED on 1/18/24 for complaints of epigastric pain and tremors.    History is limited as patient is a poor historian. Patient reports he has had abdominal pain for the past few days and today, the pain got so severe he could not eat his roti for dinner. Denies radiation of pain to back. Associated two episodes of emesis. Did not try analgesics for pain at home. Also started having bilateral hand tremors two days ago. Of note, he typically drinks 1 glass of whiskey on most days for 35 years. Last drink was three days ago. Chart review reveals multiple prior admission for similar complaints. Most recent admission from 12/31/23 - 1/1/24. Admitted for alcohol withdrawal. Started on CIWA protocol. Left AMA.     In the ED, VSS except HR as elevated as 147. Hgb 12.6, bicarb 20, Cr 1.45, , , lipase 113. Blood alcohol level negative. U/A not suggestive of infection. Received 2L NS bolus, acetaminophen 1 g IV, and ketorolac 15 mg IV, lorazepam 2 mg IV, and ondansetron 4 mg IV. Stefan Degroot is a 58 year old male with PMHx of EtOH abuse and GERD secondary to gastritis who presented to the ED on 1/18/24 for complaints of epigastric pain and hand tremors.    History is limited as patient is a poor historian. Patient reports he has had abdominal pain for the past few days and today, the pain got so severe he could not eat his roti for dinner. Denies radiation of pain to back. Associated two episodes of emesis. Did not try analgesics for pain at home. Also started having bilateral hand tremors two days ago. Of note, he typically drinks 1 glass of whiskey on most days for 35 years. Last drink was three days ago. Chart review reveals multiple prior admission for similar complaints. Most recent admission from 12/31/23 - 1/1/24. Admitted for alcohol withdrawal. Started on CIWA protocol. Left AMA.     In the ED, VSS except HR as elevated as 147. Hgb 12.6, bicarb 20, Cr 1.45, , , lipase 113. Blood alcohol level negative. U/A not suggestive of infection. Received 2L NS bolus, acetaminophen 1 g IV, and ketorolac 15 mg IV, lorazepam 2 mg IV, and ondansetron 4 mg IV. Stefan Degroot is a 58 year old male with PMHx of EtOH abuse and GERD secondary to gastritis who presented to the ED on 1/18/24 for complaints of epigastric pain and hand tremors.    History is limited as patient is a poor historian. Patient reports he has had abdominal pain for the past few days and today, the pain got so severe he could not eat his roti for dinner. Denies radiation of pain to back. Associated two episodes of emesis. Did not try analgesics for pain at home. Also started having bilateral hand tremors two days ago. Of note, he typically drinks 1 glass of whiskey on most days for 35 years. Last drink was three days ago. Chart review reveals multiple prior admissions for similar complaints. Most recent admission from 12/31/23 - 1/1/24. Admitted for alcohol withdrawal. Started on CIWA protocol. Left AMA.     In the ED, VSS except HR as elevated as 147. Hgb 12.6, bicarb 20, Cr 1.45, , , lipase 113. Blood alcohol level negative. U/A not suggestive of infection. Received 2L NS bolus, acetaminophen 1 g IV, and ketorolac 15 mg IV, lorazepam 2 mg IV, and ondansetron 4 mg IV.

## 2024-01-19 DIAGNOSIS — D50.9 IRON DEFICIENCY ANEMIA, UNSPECIFIED: ICD-10-CM

## 2024-01-19 DIAGNOSIS — K21.9 GASTRO-ESOPHAGEAL REFLUX DISEASE WITHOUT ESOPHAGITIS: ICD-10-CM

## 2024-01-19 DIAGNOSIS — F10.239 ALCOHOL DEPENDENCE WITH WITHDRAWAL, UNSPECIFIED: ICD-10-CM

## 2024-01-19 DIAGNOSIS — R74.01 ELEVATION OF LEVELS OF LIVER TRANSAMINASE LEVELS: ICD-10-CM

## 2024-01-19 DIAGNOSIS — E87.20 ACIDOSIS, UNSPECIFIED: ICD-10-CM

## 2024-01-19 DIAGNOSIS — N17.9 ACUTE KIDNEY FAILURE, UNSPECIFIED: ICD-10-CM

## 2024-01-19 LAB
ALBUMIN SERPL ELPH-MCNC: 3.2 G/DL — LOW (ref 3.3–5)
ALP SERPL-CCNC: 61 U/L — SIGNIFICANT CHANGE UP (ref 40–120)
ALT FLD-CCNC: 140 U/L — HIGH (ref 12–78)
ANION GAP SERPL CALC-SCNC: 5 MMOL/L — SIGNIFICANT CHANGE UP (ref 5–17)
AST SERPL-CCNC: 135 U/L — HIGH (ref 15–37)
BILIRUB DIRECT SERPL-MCNC: 0.4 MG/DL — HIGH (ref 0–0.3)
BILIRUB INDIRECT FLD-MCNC: 0.6 MG/DL — SIGNIFICANT CHANGE UP (ref 0.2–1)
BILIRUB SERPL-MCNC: 1 MG/DL — SIGNIFICANT CHANGE UP (ref 0.2–1.2)
BUN SERPL-MCNC: 11 MG/DL — SIGNIFICANT CHANGE UP (ref 7–23)
CALCIUM SERPL-MCNC: 8.7 MG/DL — SIGNIFICANT CHANGE UP (ref 8.5–10.1)
CHLORIDE SERPL-SCNC: 109 MMOL/L — HIGH (ref 96–108)
CO2 SERPL-SCNC: 27 MMOL/L — SIGNIFICANT CHANGE UP (ref 22–31)
CREAT SERPL-MCNC: 1.11 MG/DL — SIGNIFICANT CHANGE UP (ref 0.5–1.3)
EGFR: 78 ML/MIN/1.73M2 — SIGNIFICANT CHANGE UP
GLUCOSE SERPL-MCNC: 86 MG/DL — SIGNIFICANT CHANGE UP (ref 70–99)
HCT VFR BLD CALC: 36 % — LOW (ref 39–50)
HGB BLD-MCNC: 11 G/DL — LOW (ref 13–17)
LIDOCAIN IGE QN: 91 U/L — HIGH (ref 13–75)
MCHC RBC-ENTMCNC: 24.2 PG — LOW (ref 27–34)
MCHC RBC-ENTMCNC: 30.6 G/DL — LOW (ref 32–36)
MCV RBC AUTO: 79.1 FL — LOW (ref 80–100)
NRBC # BLD: 0 /100 WBCS — SIGNIFICANT CHANGE UP (ref 0–0)
PLATELET # BLD AUTO: 162 K/UL — SIGNIFICANT CHANGE UP (ref 150–400)
POTASSIUM SERPL-MCNC: 3.5 MMOL/L — SIGNIFICANT CHANGE UP (ref 3.5–5.3)
POTASSIUM SERPL-SCNC: 3.5 MMOL/L — SIGNIFICANT CHANGE UP (ref 3.5–5.3)
PROT SERPL-MCNC: 7.4 GM/DL — SIGNIFICANT CHANGE UP (ref 6–8.3)
RBC # BLD: 4.55 M/UL — SIGNIFICANT CHANGE UP (ref 4.2–5.8)
RBC # FLD: 21.1 % — HIGH (ref 10.3–14.5)
SODIUM SERPL-SCNC: 141 MMOL/L — SIGNIFICANT CHANGE UP (ref 135–145)
WBC # BLD: 3.9 K/UL — SIGNIFICANT CHANGE UP (ref 3.8–10.5)
WBC # FLD AUTO: 3.9 K/UL — SIGNIFICANT CHANGE UP (ref 3.8–10.5)

## 2024-01-19 PROCEDURE — 76937 US GUIDE VASCULAR ACCESS: CPT | Mod: 26,59

## 2024-01-19 PROCEDURE — 36000 PLACE NEEDLE IN VEIN: CPT

## 2024-01-19 PROCEDURE — 99232 SBSQ HOSP IP/OBS MODERATE 35: CPT

## 2024-01-19 RX ORDER — PHENOBARBITAL 60 MG
130 TABLET ORAL ONCE
Refills: 0 | Status: DISCONTINUED | OUTPATIENT
Start: 2024-01-19 | End: 2024-01-19

## 2024-01-19 RX ORDER — HEPARIN SODIUM 5000 [USP'U]/ML
5000 INJECTION INTRAVENOUS; SUBCUTANEOUS EVERY 12 HOURS
Refills: 0 | Status: DISCONTINUED | OUTPATIENT
Start: 2024-01-20 | End: 2024-02-01

## 2024-01-19 RX ORDER — TRAMADOL HYDROCHLORIDE 50 MG/1
50 TABLET ORAL EVERY 6 HOURS
Refills: 0 | Status: DISCONTINUED | OUTPATIENT
Start: 2024-01-19 | End: 2024-01-20

## 2024-01-19 RX ORDER — PHENOBARBITAL 60 MG
260 TABLET ORAL
Refills: 0 | Status: DISCONTINUED | OUTPATIENT
Start: 2024-01-19 | End: 2024-01-19

## 2024-01-19 RX ORDER — PHENOBARBITAL 60 MG
130 TABLET ORAL EVERY 4 HOURS
Refills: 0 | Status: DISCONTINUED | OUTPATIENT
Start: 2024-01-19 | End: 2024-01-20

## 2024-01-19 RX ORDER — PHENOBARBITAL 60 MG
TABLET ORAL
Refills: 0 | Status: DISCONTINUED | OUTPATIENT
Start: 2024-01-20 | End: 2024-01-20

## 2024-01-19 RX ORDER — PHENOBARBITAL 60 MG
64.8 TABLET ORAL EVERY 12 HOURS
Refills: 0 | Status: DISCONTINUED | OUTPATIENT
Start: 2024-01-20 | End: 2024-01-20

## 2024-01-19 RX ADMIN — Medication 1 GRAM(S): at 11:38

## 2024-01-19 RX ADMIN — Medication 1 MILLIGRAM(S): at 11:37

## 2024-01-19 RX ADMIN — TRAMADOL HYDROCHLORIDE 50 MILLIGRAM(S): 50 TABLET ORAL at 12:38

## 2024-01-19 RX ADMIN — Medication 1 TABLET(S): at 11:37

## 2024-01-19 RX ADMIN — TRAMADOL HYDROCHLORIDE 50 MILLIGRAM(S): 50 TABLET ORAL at 11:38

## 2024-01-19 RX ADMIN — Medication 1 GRAM(S): at 23:20

## 2024-01-19 RX ADMIN — Medication 100 MILLIGRAM(S): at 11:38

## 2024-01-19 RX ADMIN — FAMOTIDINE 20 MILLIGRAM(S): 10 INJECTION INTRAVENOUS at 06:04

## 2024-01-19 RX ADMIN — Medication 260 MILLIGRAM(S): at 14:25

## 2024-01-19 RX ADMIN — Medication 130 MILLIGRAM(S): at 20:52

## 2024-01-19 RX ADMIN — Medication 1 GRAM(S): at 06:03

## 2024-01-19 RX ADMIN — Medication 260 MILLIGRAM(S): at 17:10

## 2024-01-19 RX ADMIN — Medication 260 MILLIGRAM(S): at 11:38

## 2024-01-19 RX ADMIN — Medication 2 MILLIGRAM(S): at 08:56

## 2024-01-19 RX ADMIN — ATORVASTATIN CALCIUM 20 MILLIGRAM(S): 80 TABLET, FILM COATED ORAL at 22:49

## 2024-01-19 RX ADMIN — Medication 50 MILLIGRAM(S): at 06:04

## 2024-01-19 RX ADMIN — Medication 1 GRAM(S): at 17:10

## 2024-01-19 RX ADMIN — Medication 1 GRAM(S): at 00:44

## 2024-01-19 RX ADMIN — PANTOPRAZOLE SODIUM 40 MILLIGRAM(S): 20 TABLET, DELAYED RELEASE ORAL at 06:07

## 2024-01-19 NOTE — PHARMACY COMMUNICATION NOTE - REASON FOR NOTE
PA is aware 3 doses of phenobarb 260mg is being given, PA wants 1 more additional dose of 130mg at 9pm

## 2024-01-20 LAB
ALBUMIN SERPL ELPH-MCNC: 3.1 G/DL — LOW (ref 3.3–5)
ALP SERPL-CCNC: 64 U/L — SIGNIFICANT CHANGE UP (ref 40–120)
ALT FLD-CCNC: 120 U/L — HIGH (ref 12–78)
ANION GAP SERPL CALC-SCNC: 9 MMOL/L — SIGNIFICANT CHANGE UP (ref 5–17)
AST SERPL-CCNC: 105 U/L — HIGH (ref 15–37)
BILIRUB SERPL-MCNC: 0.5 MG/DL — SIGNIFICANT CHANGE UP (ref 0.2–1.2)
BUN SERPL-MCNC: 9 MG/DL — SIGNIFICANT CHANGE UP (ref 7–23)
CALCIUM SERPL-MCNC: 8.4 MG/DL — LOW (ref 8.5–10.1)
CHLORIDE SERPL-SCNC: 105 MMOL/L — SIGNIFICANT CHANGE UP (ref 96–108)
CO2 SERPL-SCNC: 23 MMOL/L — SIGNIFICANT CHANGE UP (ref 22–31)
CREAT SERPL-MCNC: 0.86 MG/DL — SIGNIFICANT CHANGE UP (ref 0.5–1.3)
CULTURE RESULTS: NO GROWTH — SIGNIFICANT CHANGE UP
EGFR: 102 ML/MIN/1.73M2 — SIGNIFICANT CHANGE UP
GLUCOSE SERPL-MCNC: 83 MG/DL — SIGNIFICANT CHANGE UP (ref 70–99)
HCT VFR BLD CALC: 35.5 % — LOW (ref 39–50)
HGB BLD-MCNC: 10.8 G/DL — LOW (ref 13–17)
LIDOCAIN IGE QN: 99 U/L — HIGH (ref 13–75)
MAGNESIUM SERPL-MCNC: 1.5 MG/DL — LOW (ref 1.6–2.6)
MCHC RBC-ENTMCNC: 24.2 PG — LOW (ref 27–34)
MCHC RBC-ENTMCNC: 30.4 G/DL — LOW (ref 32–36)
MCV RBC AUTO: 79.6 FL — LOW (ref 80–100)
NRBC # BLD: 0 /100 WBCS — SIGNIFICANT CHANGE UP (ref 0–0)
PHOSPHATE SERPL-MCNC: 2.2 MG/DL — LOW (ref 2.5–4.5)
PLATELET # BLD AUTO: 159 K/UL — SIGNIFICANT CHANGE UP (ref 150–400)
POTASSIUM SERPL-MCNC: 3.6 MMOL/L — SIGNIFICANT CHANGE UP (ref 3.5–5.3)
POTASSIUM SERPL-SCNC: 3.6 MMOL/L — SIGNIFICANT CHANGE UP (ref 3.5–5.3)
PROT SERPL-MCNC: 6.9 GM/DL — SIGNIFICANT CHANGE UP (ref 6–8.3)
RBC # BLD: 4.46 M/UL — SIGNIFICANT CHANGE UP (ref 4.2–5.8)
RBC # FLD: 20.9 % — HIGH (ref 10.3–14.5)
SODIUM SERPL-SCNC: 137 MMOL/L — SIGNIFICANT CHANGE UP (ref 135–145)
SPECIMEN SOURCE: SIGNIFICANT CHANGE UP
WBC # BLD: 2.87 K/UL — LOW (ref 3.8–10.5)
WBC # FLD AUTO: 2.87 K/UL — LOW (ref 3.8–10.5)

## 2024-01-20 PROCEDURE — 99232 SBSQ HOSP IP/OBS MODERATE 35: CPT

## 2024-01-20 PROCEDURE — 99291 CRITICAL CARE FIRST HOUR: CPT

## 2024-01-20 RX ORDER — PHENOBARBITAL 60 MG
260 TABLET ORAL ONCE
Refills: 0 | Status: DISCONTINUED | OUTPATIENT
Start: 2024-01-20 | End: 2024-01-20

## 2024-01-20 RX ORDER — DIPHENHYDRAMINE HCL 50 MG
50 CAPSULE ORAL ONCE
Refills: 0 | Status: DISCONTINUED | OUTPATIENT
Start: 2024-01-20 | End: 2024-01-20

## 2024-01-20 RX ORDER — PHENOBARBITAL 60 MG
130 TABLET ORAL
Refills: 0 | Status: DISCONTINUED | OUTPATIENT
Start: 2024-01-20 | End: 2024-01-22

## 2024-01-20 RX ORDER — HALOPERIDOL DECANOATE 100 MG/ML
5 INJECTION INTRAMUSCULAR ONCE
Refills: 0 | Status: COMPLETED | OUTPATIENT
Start: 2024-01-20 | End: 2024-01-20

## 2024-01-20 RX ORDER — MAGNESIUM SULFATE 500 MG/ML
2 VIAL (ML) INJECTION ONCE
Refills: 0 | Status: COMPLETED | OUTPATIENT
Start: 2024-01-20 | End: 2024-01-20

## 2024-01-20 RX ORDER — DIPHENHYDRAMINE HCL 50 MG
50 CAPSULE ORAL ONCE
Refills: 0 | Status: COMPLETED | OUTPATIENT
Start: 2024-01-20 | End: 2024-01-20

## 2024-01-20 RX ORDER — SODIUM,POTASSIUM PHOSPHATES 278-250MG
2 POWDER IN PACKET (EA) ORAL THREE TIMES A DAY
Refills: 0 | Status: COMPLETED | OUTPATIENT
Start: 2024-01-20 | End: 2024-01-21

## 2024-01-20 RX ORDER — SODIUM,POTASSIUM PHOSPHATES 278-250MG
1 POWDER IN PACKET (EA) ORAL ONCE
Refills: 0 | Status: COMPLETED | OUTPATIENT
Start: 2024-01-20 | End: 2024-01-20

## 2024-01-20 RX ADMIN — Medication 650 MILLIGRAM(S): at 06:25

## 2024-01-20 RX ADMIN — Medication 130 MILLIGRAM(S): at 22:44

## 2024-01-20 RX ADMIN — Medication 130 MILLIGRAM(S): at 00:58

## 2024-01-20 RX ADMIN — Medication 50 MILLIGRAM(S): at 18:08

## 2024-01-20 RX ADMIN — Medication 100 MILLIGRAM(S): at 11:50

## 2024-01-20 RX ADMIN — Medication 260 MILLIGRAM(S): at 19:34

## 2024-01-20 RX ADMIN — HALOPERIDOL DECANOATE 5 MILLIGRAM(S): 100 INJECTION INTRAMUSCULAR at 12:25

## 2024-01-20 RX ADMIN — Medication 1 PACKET(S): at 11:50

## 2024-01-20 RX ADMIN — HEPARIN SODIUM 5000 UNIT(S): 5000 INJECTION INTRAVENOUS; SUBCUTANEOUS at 06:26

## 2024-01-20 RX ADMIN — Medication 1 GRAM(S): at 11:50

## 2024-01-20 RX ADMIN — Medication 130 MILLIGRAM(S): at 17:46

## 2024-01-20 RX ADMIN — Medication 1 MILLIGRAM(S): at 11:50

## 2024-01-20 RX ADMIN — Medication 25 GRAM(S): at 11:09

## 2024-01-20 RX ADMIN — Medication 130 MILLIGRAM(S): at 13:39

## 2024-01-20 RX ADMIN — Medication 1 GRAM(S): at 06:26

## 2024-01-20 RX ADMIN — Medication 130 MILLIGRAM(S): at 11:49

## 2024-01-20 RX ADMIN — Medication 50 MILLIGRAM(S): at 12:51

## 2024-01-20 RX ADMIN — PANTOPRAZOLE SODIUM 40 MILLIGRAM(S): 20 TABLET, DELAYED RELEASE ORAL at 06:26

## 2024-01-20 RX ADMIN — TRAMADOL HYDROCHLORIDE 50 MILLIGRAM(S): 50 TABLET ORAL at 12:40

## 2024-01-20 RX ADMIN — Medication 1 TABLET(S): at 11:50

## 2024-01-20 RX ADMIN — HEPARIN SODIUM 5000 UNIT(S): 5000 INJECTION INTRAVENOUS; SUBCUTANEOUS at 18:03

## 2024-01-20 RX ADMIN — TRAMADOL HYDROCHLORIDE 50 MILLIGRAM(S): 50 TABLET ORAL at 11:50

## 2024-01-20 RX ADMIN — Medication 25 GRAM(S): at 13:48

## 2024-01-20 RX ADMIN — Medication 64.8 MILLIGRAM(S): at 06:26

## 2024-01-20 RX ADMIN — Medication 130 MILLIGRAM(S): at 08:21

## 2024-01-20 RX ADMIN — ATORVASTATIN CALCIUM 20 MILLIGRAM(S): 80 TABLET, FILM COATED ORAL at 21:52

## 2024-01-20 RX ADMIN — Medication 260 MILLIGRAM(S): at 09:50

## 2024-01-20 RX ADMIN — Medication 2 PACKET(S): at 21:53

## 2024-01-20 RX ADMIN — Medication 260 MILLIGRAM(S): at 12:06

## 2024-01-20 NOTE — PHARMACOTHERAPY INTERVENTION NOTE - COMMENTS
Spoke to pharmacist at [facility] and confirmed medication list.    Home Medications:  atorvastatin 20 mg oral tablet: 1 tab(s) orally once a day, Date filled 12/19/23, 30 days  Multiple Vitamins oral tablet: 1 tab(s) orally once a day, Date filled 12/19/23, 30 days  pantoprazole 40 mg oral delayed release tablet: 1 tab(s) orally once a day (before a meal) Date filled 12/20/23, 30 days

## 2024-01-20 NOTE — CHART NOTE - NSCHARTNOTEFT_GEN_A_CORE
58 year old male with PMHx of EtOH abuse and GERD secondary to gastritis/ PUD who presented to the ED on 1/18/24 for complaints of epigastric pain and hand tremors and admitted for alcohol dependence with high risk for withdrawal and ROSA. Pt well known to hospital for frequent admissions for withdrawal and DTs. Pt also known to have DTs refractive to benzo therapy in the past.  Pt loaded with 10-12mg/kg of phenobarb yesterday and scheduled PO taper for today.  Today pt with persistent DT sx requiring frequent prn phenobarb pushes.     -Pt given 260mg x2 IVP of phenobarb for breakthrough DTs and increased prn 130mg phenobarb to q2hr  - Pt given 5mg haldol and 50mg IVP benadryl for behavioral control.   - will consult ICU, although no beds available at this time.     Pt now sitting in chair with lap-belt intermittently trying to get up but redirectable.    d/w hospitalist 58 year old male with PMHx of EtOH abuse and GERD secondary to gastritis/ PUD who presented to the ED on 1/18/24 for complaints of epigastric pain and hand tremors and admitted for alcohol dependence with high risk for withdrawal and ROSA. Pt well known to hospital for frequent admissions for withdrawal and DTs. Pt also known to have DTs refractive to benzo therapy in the past.  Pt loaded with 10-12mg/kg of phenobarb yesterday and scheduled PO taper for today.  Today pt with persistent DT sx requiring frequent prn phenobarb pushes.     -Pt given 260mg x2 IVP of phenobarb for breakthrough DTs and increased prn 130mg phenobarb to q2hr  - Pt given 5mg haldol and 50mg IVP benadryl for behavioral control.   - will consult ICU, although no beds available at this time.     Pt now sitting in chair with lap-belt intermittently with improvement in DT symptoms. Still trying to get up but now more redirectable.    d/w hospitalist    cc time: I spent 40 minutes assessing presenting problems of acute illness, which pose high probability of life threatening deterioration or end organ damage/dysfunction, as well as medical decision making including initiating plan of care, reviewing data, reviewing radiologic exams, discussing with multidisciplinary team,  discussing goals of care with patient/family, and writing this note.  Non-inclusive of procedures performed,

## 2024-01-20 NOTE — CONSULT NOTE ADULT - ASSESSMENT
59 yo m pmhx ETOH abuse several admissions for etoh withdrawal in the past, PUD, gastritis admitted with ETOH withdrawal and gastritis.      RECOMMENDATIONS:  -continue phenobarb as ordered  -can consider haldol/benadryl prn as well pending qtc level on EKG  -patient redirectable, nonviolent  -constant obs with nursing aide  -multivitamin/thiamine/folic acid    Nursing staff concerned about amount of phenobarbital received today, endorsed that patient is very much alert and intermittently climbing out of bed, biggest concern with phenobarb use would be cns/respiratory depression for which patient is not showing any signs of at this time.  Patient seen at bedside, meds/chart/meds reviewed, case discussed with eICU attending Dr. Baker.  At this time patient does not require ICU level of care.  Please reconsult as needed.   59 yo m pmhx ETOH abuse several admissions for etoh withdrawal in the past, PUD, gastritis admitted with ETOH withdrawal and gastritis.      RECOMMENDATIONS:  -continue phenobarb as ordered  -can consider haldol/benadryl prn as well pending qtc level on EKG  -patient redirectable, nonviolent  -constant obs with nursing aide  -multivitamin/thiamine/folic acid  -discussed with eicu attending, re-add standing PO phenobarb and taper over the next several days    Nursing staff concerned about amount of phenobarbital received today, endorsed that patient is very much alert and intermittently climbing out of bed, biggest concern with phenobarb use would be cns/respiratory depression for which patient is not showing any signs of at this time.  Patient seen at bedside, meds/chart/meds reviewed, case discussed with eICU attending Dr. Baker.  At this time patient does not require ICU level of care.  Please reconsult as needed.

## 2024-01-21 LAB
ALBUMIN SERPL ELPH-MCNC: 3.3 G/DL — SIGNIFICANT CHANGE UP (ref 3.3–5)
ALP SERPL-CCNC: 71 U/L — SIGNIFICANT CHANGE UP (ref 40–120)
ALT FLD-CCNC: 106 U/L — HIGH (ref 12–78)
ANION GAP SERPL CALC-SCNC: 10 MMOL/L — SIGNIFICANT CHANGE UP (ref 5–17)
AST SERPL-CCNC: 97 U/L — HIGH (ref 15–37)
BILIRUB SERPL-MCNC: 0.6 MG/DL — SIGNIFICANT CHANGE UP (ref 0.2–1.2)
BUN SERPL-MCNC: 8 MG/DL — SIGNIFICANT CHANGE UP (ref 7–23)
CALCIUM SERPL-MCNC: 8.5 MG/DL — SIGNIFICANT CHANGE UP (ref 8.5–10.1)
CHLORIDE SERPL-SCNC: 107 MMOL/L — SIGNIFICANT CHANGE UP (ref 96–108)
CO2 SERPL-SCNC: 20 MMOL/L — LOW (ref 22–31)
CREAT SERPL-MCNC: 1.07 MG/DL — SIGNIFICANT CHANGE UP (ref 0.5–1.3)
EGFR: 81 ML/MIN/1.73M2 — SIGNIFICANT CHANGE UP
GLUCOSE SERPL-MCNC: 84 MG/DL — SIGNIFICANT CHANGE UP (ref 70–99)
HCT VFR BLD CALC: 34.7 % — LOW (ref 39–50)
HGB BLD-MCNC: 11 G/DL — LOW (ref 13–17)
MAGNESIUM SERPL-MCNC: 1.8 MG/DL — SIGNIFICANT CHANGE UP (ref 1.6–2.6)
MCHC RBC-ENTMCNC: 24.8 PG — LOW (ref 27–34)
MCHC RBC-ENTMCNC: 31.7 G/DL — LOW (ref 32–36)
MCV RBC AUTO: 78.2 FL — LOW (ref 80–100)
NRBC # BLD: 0 /100 WBCS — SIGNIFICANT CHANGE UP (ref 0–0)
PHOSPHATE SERPL-MCNC: 1.9 MG/DL — LOW (ref 2.5–4.5)
PLATELET # BLD AUTO: 122 K/UL — LOW (ref 150–400)
POTASSIUM SERPL-MCNC: 3.8 MMOL/L — SIGNIFICANT CHANGE UP (ref 3.5–5.3)
POTASSIUM SERPL-SCNC: 3.8 MMOL/L — SIGNIFICANT CHANGE UP (ref 3.5–5.3)
PROT SERPL-MCNC: 7.5 GM/DL — SIGNIFICANT CHANGE UP (ref 6–8.3)
RBC # BLD: 4.44 M/UL — SIGNIFICANT CHANGE UP (ref 4.2–5.8)
RBC # FLD: 21.3 % — HIGH (ref 10.3–14.5)
SODIUM SERPL-SCNC: 137 MMOL/L — SIGNIFICANT CHANGE UP (ref 135–145)
WBC # BLD: 7.16 K/UL — SIGNIFICANT CHANGE UP (ref 3.8–10.5)
WBC # FLD AUTO: 7.16 K/UL — SIGNIFICANT CHANGE UP (ref 3.8–10.5)

## 2024-01-21 PROCEDURE — 99232 SBSQ HOSP IP/OBS MODERATE 35: CPT

## 2024-01-21 RX ORDER — SODIUM CHLORIDE 9 MG/ML
500 INJECTION INTRAMUSCULAR; INTRAVENOUS; SUBCUTANEOUS ONCE
Refills: 0 | Status: COMPLETED | OUTPATIENT
Start: 2024-01-21 | End: 2024-01-21

## 2024-01-21 RX ORDER — SODIUM,POTASSIUM PHOSPHATES 278-250MG
1 POWDER IN PACKET (EA) ORAL THREE TIMES A DAY
Refills: 0 | Status: DISCONTINUED | OUTPATIENT
Start: 2024-01-21 | End: 2024-01-21

## 2024-01-21 RX ORDER — PHENOBARBITAL 60 MG
64.8 TABLET ORAL EVERY 12 HOURS
Refills: 0 | Status: DISCONTINUED | OUTPATIENT
Start: 2024-01-21 | End: 2024-01-22

## 2024-01-21 RX ORDER — SODIUM,POTASSIUM PHOSPHATES 278-250MG
2 POWDER IN PACKET (EA) ORAL
Refills: 0 | Status: DISCONTINUED | OUTPATIENT
Start: 2024-01-21 | End: 2024-01-22

## 2024-01-21 RX ORDER — PHENOBARBITAL 60 MG
130 TABLET ORAL ONCE
Refills: 0 | Status: DISCONTINUED | OUTPATIENT
Start: 2024-01-21 | End: 2024-01-21

## 2024-01-21 RX ADMIN — Medication 130 MILLIGRAM(S): at 21:50

## 2024-01-21 RX ADMIN — HEPARIN SODIUM 5000 UNIT(S): 5000 INJECTION INTRAVENOUS; SUBCUTANEOUS at 05:39

## 2024-01-21 RX ADMIN — Medication 1 MILLIGRAM(S): at 16:15

## 2024-01-21 RX ADMIN — Medication 130 MILLIGRAM(S): at 05:38

## 2024-01-21 RX ADMIN — Medication 130 MILLIGRAM(S): at 07:54

## 2024-01-21 RX ADMIN — Medication 2 PACKET(S): at 00:40

## 2024-01-21 RX ADMIN — Medication 100 MILLIGRAM(S): at 16:15

## 2024-01-21 RX ADMIN — Medication 650 MILLIGRAM(S): at 02:40

## 2024-01-21 RX ADMIN — Medication 2 PACKET(S): at 16:15

## 2024-01-21 RX ADMIN — Medication 3 MILLIGRAM(S): at 02:40

## 2024-01-21 RX ADMIN — Medication 130 MILLIGRAM(S): at 10:32

## 2024-01-21 RX ADMIN — PANTOPRAZOLE SODIUM 40 MILLIGRAM(S): 20 TABLET, DELAYED RELEASE ORAL at 06:51

## 2024-01-21 RX ADMIN — Medication 1 TABLET(S): at 16:15

## 2024-01-21 RX ADMIN — Medication 1 GRAM(S): at 00:54

## 2024-01-21 RX ADMIN — Medication 130 MILLIGRAM(S): at 02:41

## 2024-01-21 RX ADMIN — Medication 650 MILLIGRAM(S): at 03:30

## 2024-01-21 RX ADMIN — SODIUM CHLORIDE 500 MILLILITER(S): 9 INJECTION INTRAMUSCULAR; INTRAVENOUS; SUBCUTANEOUS at 05:43

## 2024-01-21 RX ADMIN — Medication 1 GRAM(S): at 05:37

## 2024-01-21 RX ADMIN — HEPARIN SODIUM 5000 UNIT(S): 5000 INJECTION INTRAVENOUS; SUBCUTANEOUS at 17:26

## 2024-01-21 RX ADMIN — Medication 130 MILLIGRAM(S): at 04:40

## 2024-01-21 RX ADMIN — Medication 64.8 MILLIGRAM(S): at 17:30

## 2024-01-21 RX ADMIN — ATORVASTATIN CALCIUM 20 MILLIGRAM(S): 80 TABLET, FILM COATED ORAL at 21:52

## 2024-01-21 RX ADMIN — Medication 64.8 MILLIGRAM(S): at 00:54

## 2024-01-21 RX ADMIN — Medication 1 GRAM(S): at 16:15

## 2024-01-21 NOTE — CONSULT NOTE ADULT - REASON FOR ADMISSION
Alcohol dependence with high risk for withdrawal, ROSA
Alcohol dependence with high risk for withdrawal, ROSA

## 2024-01-21 NOTE — PROVIDER CONTACT NOTE (EICU) - ASSESSMENT
Telehealth evaluation precludes physical exam. Pt not evaluated by me as he is located on medical floors. Per ICU PA, pt is not tremulous, mildly confused but re-directable. trying to climb out of bed at times. No signs/symptoms of worsening withdrawal.

## 2024-01-21 NOTE — CONSULT NOTE ADULT - SUBJECTIVE AND OBJECTIVE BOX
Medicine ACP reconsulting ICU for persistent DTs.  Upon my eval, pt is sleeping in the chair, not shaking/tremleous and without tachycardia and arousable to voice and easily redirectable.   Withdrawal sx at this point appear well controlled on current regimen    recs:  -continue phenobarb as ordered  -patient redirectable, nonviolent  -constant obs with nursing aide  -multivitamin/thiamine/folic acid    no indication for icu level of care at this time. Please reconsult with any questions     d/w dr rojas 
Patient is a 58y old  Male who presents with a chief complaint of Alcohol dependence with high risk for withdrawal, ROSA (20 Jan 2024 19:36)      BRIEF HOSPITAL COURSE:   59 yo m pmhx ETOH abuse several admissions for etoh withdrawal in the past, PUD, gastritis admitted with ETOH withdrawal and gastritis.      Events last 24 hours:   Called for concerns for withdrawal.  Patient seen at bedside awake and interactive, however confused, endorsing he is hungry. patient switching/intertwining english and his primary language.  intermittently trying to climb out of bed but is re-directable and nonviolent at this time.  Patient not noted to be tremulous.  manually HR measured at 104.  Patient has been managed on the floor with phenobarbital regimen.      Today's medications received:   Phenobarb 130mg IVP: 11:49, 13:39, 17:46, 22:44  Phenobarb 260mg IVP: 09:50, 12:06, 19:34  Haldol 5mg IVP: 12:25  Benadryl 50 mg IVP: 12:51, 18:08      PAST MEDICAL & SURGICAL HISTORY:  PUD (peptic ulcer disease)  EtOH dependence  Gastritis  No significant past surgical history    Allergies  No Known Allergies      FAMILY HISTORY:  FH: HTN (hypertension)      Social History:   etoh abuse       Review of Systems:  +hungry       Physical Examination:    General: No acute distress.      HEENT: nc/at    PULM: symmetrical thorax expansion     CVS: sinus tach 104    ABD: Soft, nondistended, nontender    EXT: No edema    SKIN: Warm     NEURO: Alert, interactive, confused and grossly nonfocal       Medications:  acetaminophen     Tablet .. 650 milliGRAM(s) Oral every 6 hours PRN  melatonin 3 milliGRAM(s) Oral at bedtime PRN  ondansetron Injectable 4 milliGRAM(s) IV Push every 8 hours PRN  PHENobarbital Injectable 130 milliGRAM(s) IV Push every 2 hours PRN  traMADol 50 milliGRAM(s) Oral every 6 hours PRN  heparin   Injectable 5000 Unit(s) SubCutaneous every 12 hours  aluminum hydroxide/magnesium hydroxide/simethicone Suspension 30 milliLiter(s) Oral every 4 hours PRN  pantoprazole    Tablet 40 milliGRAM(s) Oral before breakfast  sucralfate 1 Gram(s) Oral four times a day  atorvastatin 20 milliGRAM(s) Oral at bedtime  folic acid 1 milliGRAM(s) Oral daily  multivitamin 1 Tablet(s) Oral daily  potassium phosphate / sodium phosphate Powder (PHOS-NaK) 2 Packet(s) Oral three times a day  thiamine 100 milliGRAM(s) Oral daily      ICU Vital Signs Last 24 Hrs  T(C): 36.8 (20 Jan 2024 16:37), Max: 36.8 (19 Jan 2024 23:45)  T(F): 98.3 (20 Jan 2024 16:37), Max: 98.3 (19 Jan 2024 23:45)  HR: 105 (20 Jan 2024 22:43) (59 - 105)  BP: 136/84 (20 Jan 2024 22:43) (126/78 - 175/66)  BP(mean): --  ABP: --  ABP(mean): --  RR: 18 (20 Jan 2024 16:37) (18 - 18)  SpO2: 99% (20 Jan 2024 16:37) (98% - 99%)    O2 Parameters below as of 20 Jan 2024 16:37  Patient On (Oxygen Delivery Method): room air      Vital Signs Last 24 Hrs  T(C): 36.8 (20 Jan 2024 16:37), Max: 36.8 (19 Jan 2024 23:45)  T(F): 98.3 (20 Jan 2024 16:37), Max: 98.3 (19 Jan 2024 23:45)  HR: 105 (20 Jan 2024 22:43) (59 - 105)  BP: 136/84 (20 Jan 2024 22:43) (126/78 - 175/66)  BP(mean): --  RR: 18 (20 Jan 2024 16:37) (18 - 18)  SpO2: 99% (20 Jan 2024 16:37) (98% - 99%)    Parameters below as of 20 Jan 2024 16:37  Patient On (Oxygen Delivery Method): room air      I&O's Detail    19 Jan 2024 07:01  -  20 Jan 2024 07:00  --------------------------------------------------------  IN:    Oral Fluid: 1072 mL  Total IN: 1072 mL    OUT:  Total OUT: 0 mL  Total NET: 1072 mL      20 Jan 2024 07:01  -  20 Jan 2024 23:14  --------------------------------------------------------  IN:    Oral Fluid: 400 mL  Total IN: 400 mL    OUT:    Voided (mL): 700 mL  Total OUT: 700 mL  Total NET: -300 mL      LABS:                        10.8   2.87  )-----------( 159      ( 20 Jan 2024 07:10 )             35.5     01-20    137  |  105  |  9   ----------------------------<  83  3.6   |  23  |  0.86    Ca    8.4<L>      20 Jan 2024 07:10  Phos  2.2     01-20  Mg     1.5     01-20    TPro  6.9  /  Alb  3.1<L>  /  TBili  0.5  /  DBili  x   /  AST  105<H>  /  ALT  120<H>  /  AlkPhos  64  01-20      Urinalysis Basic - ( 20 Jan 2024 07:10 )  Color: x / Appearance: x / SG: x / pH: x  Gluc: 83 mg/dL / Ketone: x  / Bili: x / Urobili: x   Blood: x / Protein: x / Nitrite: x   Leuk Esterase: x / RBC: x / WBC x   Sq Epi: x / Non Sq Epi: x / Bacteria: x      CULTURES:  Culture Results:   No growth (01-18 @ 21:35)      RADIOLOGY:   < from: Xray Chest 1 View- PORTABLE-Urgent (Xray Chest 1 View- PORTABLE-Urgent .) (12.30.23 @ 22:34) >  ACC: [id number] EXAM:  XR CHEST PORTABLE URGENT 1V   ORDERED BY: TROY ALMODOVAR     PROCEDURE DATE:  12/30/2023      INTERPRETATION:  AP chest on December 30, 2023 at 10:27 PM. Patient is   scheduled for admission.    Elevated diaphragms crowds the chest.    Heart magnified by technique.    Lungs are clear.    Chest is similar to October 30 this year.    IMPRESSION: No acute finding or change.    --- End of Report --    YANDEL BULLARD MD; Attending Radiologist  This document has been electronically signed. Dec 31 2023 12:00PM    < end of copied text >

## 2024-01-21 NOTE — PROVIDER CONTACT NOTE (EICU) - RECOMMENDATIONS
- continue w/ phenobarb taper - it was discontinued, unclear why, but pt clearly requires it, so please restart PO phenobarb and continue PRN  - currently 1:1 at bedside and pt is re-directable and is not violent or having worsening symptoms of withdrawal/DTs  - please ensure Mg has been repleted >2  - at this time, pt does not require ICU level of care; please call back with any further questions or if clinical status changes - plan discussed w/ ICU PAULA Tomlinson

## 2024-01-21 NOTE — PROVIDER CONTACT NOTE (EICU) - BACKGROUND
58M w/ EtOH use disorder, gastritis. Admitted w/ EtOH withdrawal and gastritis. Pt being managed w/ phenobarb. Tonight ICU consulted for ?agitation, worsening agitation,

## 2024-01-22 LAB
ALBUMIN SERPL ELPH-MCNC: 3.3 G/DL — SIGNIFICANT CHANGE UP (ref 3.3–5)
ALP SERPL-CCNC: 67 U/L — SIGNIFICANT CHANGE UP (ref 40–120)
ALT FLD-CCNC: 114 U/L — HIGH (ref 12–78)
ANION GAP SERPL CALC-SCNC: 8 MMOL/L — SIGNIFICANT CHANGE UP (ref 5–17)
AST SERPL-CCNC: 126 U/L — HIGH (ref 15–37)
BILIRUB SERPL-MCNC: 0.5 MG/DL — SIGNIFICANT CHANGE UP (ref 0.2–1.2)
BUN SERPL-MCNC: 9 MG/DL — SIGNIFICANT CHANGE UP (ref 7–23)
CALCIUM SERPL-MCNC: 8.6 MG/DL — SIGNIFICANT CHANGE UP (ref 8.5–10.1)
CHLORIDE SERPL-SCNC: 109 MMOL/L — HIGH (ref 96–108)
CO2 SERPL-SCNC: 21 MMOL/L — LOW (ref 22–31)
CREAT SERPL-MCNC: 0.83 MG/DL — SIGNIFICANT CHANGE UP (ref 0.5–1.3)
EGFR: 103 ML/MIN/1.73M2 — SIGNIFICANT CHANGE UP
GLUCOSE SERPL-MCNC: 83 MG/DL — SIGNIFICANT CHANGE UP (ref 70–99)
INR BLD: 0.97 RATIO — SIGNIFICANT CHANGE UP (ref 0.85–1.18)
MAGNESIUM SERPL-MCNC: 1.8 MG/DL — SIGNIFICANT CHANGE UP (ref 1.6–2.6)
PHOSPHATE SERPL-MCNC: 2.8 MG/DL — SIGNIFICANT CHANGE UP (ref 2.5–4.5)
POTASSIUM SERPL-MCNC: 3.6 MMOL/L — SIGNIFICANT CHANGE UP (ref 3.5–5.3)
POTASSIUM SERPL-SCNC: 3.6 MMOL/L — SIGNIFICANT CHANGE UP (ref 3.5–5.3)
PROT SERPL-MCNC: 7.5 GM/DL — SIGNIFICANT CHANGE UP (ref 6–8.3)
PROTHROM AB SERPL-ACNC: 11.5 SEC — SIGNIFICANT CHANGE UP (ref 9.5–13)
SODIUM SERPL-SCNC: 138 MMOL/L — SIGNIFICANT CHANGE UP (ref 135–145)

## 2024-01-22 PROCEDURE — 99232 SBSQ HOSP IP/OBS MODERATE 35: CPT

## 2024-01-22 RX ORDER — PHENOBARBITAL 60 MG
32.4 TABLET ORAL EVERY 12 HOURS
Refills: 0 | Status: DISCONTINUED | OUTPATIENT
Start: 2024-01-22 | End: 2024-01-29

## 2024-01-22 RX ORDER — PHENOBARBITAL 60 MG
130 TABLET ORAL
Refills: 0 | Status: DISCONTINUED | OUTPATIENT
Start: 2024-01-22 | End: 2024-01-22

## 2024-01-22 RX ORDER — PHENOBARBITAL 60 MG
130 TABLET ORAL
Refills: 0 | Status: DISCONTINUED | OUTPATIENT
Start: 2024-01-22 | End: 2024-01-24

## 2024-01-22 RX ADMIN — HEPARIN SODIUM 5000 UNIT(S): 5000 INJECTION INTRAVENOUS; SUBCUTANEOUS at 17:29

## 2024-01-22 RX ADMIN — Medication 130 MILLIGRAM(S): at 08:26

## 2024-01-22 RX ADMIN — Medication 64.8 MILLIGRAM(S): at 06:16

## 2024-01-22 RX ADMIN — HEPARIN SODIUM 5000 UNIT(S): 5000 INJECTION INTRAVENOUS; SUBCUTANEOUS at 06:15

## 2024-01-22 RX ADMIN — PANTOPRAZOLE SODIUM 40 MILLIGRAM(S): 20 TABLET, DELAYED RELEASE ORAL at 06:16

## 2024-01-22 RX ADMIN — Medication 130 MILLIGRAM(S): at 01:08

## 2024-01-22 RX ADMIN — Medication 1 GRAM(S): at 01:11

## 2024-01-22 RX ADMIN — Medication 1 GRAM(S): at 11:35

## 2024-01-22 RX ADMIN — Medication 2 PACKET(S): at 06:17

## 2024-01-22 RX ADMIN — Medication 64.8 MILLIGRAM(S): at 17:29

## 2024-01-22 RX ADMIN — Medication 2 PACKET(S): at 01:09

## 2024-01-22 RX ADMIN — Medication 1 MILLIGRAM(S): at 11:36

## 2024-01-22 RX ADMIN — Medication 130 MILLIGRAM(S): at 11:10

## 2024-01-22 RX ADMIN — Medication 1 GRAM(S): at 17:29

## 2024-01-22 RX ADMIN — Medication 1 TABLET(S): at 11:36

## 2024-01-22 RX ADMIN — Medication 130 MILLIGRAM(S): at 23:28

## 2024-01-22 RX ADMIN — Medication 130 MILLIGRAM(S): at 03:37

## 2024-01-22 RX ADMIN — Medication 1 GRAM(S): at 06:16

## 2024-01-22 NOTE — PROVIDER CONTACT NOTE (OTHER) - SITUATION
Patient has no patent IV Access. Previously placed US guided IV unable to flush. Unable to place new IV.

## 2024-01-22 NOTE — CHART NOTE - NSCHARTNOTEFT_GEN_A_CORE
Internal Medicine PA Chart Note:    HPI:  Stefan Degroot is a 58 year old male with PMHx of EtOH abuse and GERD secondary to gastritis who presented to the ED on 1/18/24 for complaints of epigastric pain and hand tremors.    History is limited as patient is a poor historian. Patient reports he has had abdominal pain for the past few days and today, the pain got so severe he could not eat his roti for dinner. Denies radiation of pain to back. Associated two episodes of emesis. Did not try analgesics for pain at home. Also started having bilateral hand tremors two days ago. Of note, he typically drinks 1 glass of whiskey on most days for 35 years. Last drink was three days ago. Chart review reveals multiple prior admissions for similar complaints. Most recent admission from 12/31/23 - 1/1/24. Admitted for alcohol withdrawal. Started on CIWA protocol. Left AMA.     In the ED, VSS except HR as elevated as 147. Hgb 12.6, bicarb 20, Cr 1.45, , , lipase 113. Blood alcohol level negative. U/A not suggestive of infection. Received 2L NS bolus, acetaminophen 1 g IV, and ketorolac 15 mg IV, lorazepam 2 mg IV, and ondansetron 4 mg IV. (18 Jan 2024 23:31)        NOtified by RN that patient IV infiltrated and requested assistance. Since he is a/f alcohol w/d his CIWA triggered sx management by phenobarbital. Ultrasound guided technique for PIV placement was attempted x2 unsuccessfully. Plan to change IV PRN phenobarbital to IM phenobarbital until peripheral access is attained.     Jo-Ann Piedra PA-C

## 2024-01-22 NOTE — PROVIDER CONTACT NOTE (OTHER) - ACTION/TREATMENT ORDERED:
PAULA Busch Unable to place US guided IV after 2 attempts. PRN IV Phenobarbital to be switched to IM route.

## 2024-01-23 LAB
ALBUMIN SERPL ELPH-MCNC: 3.3 G/DL — SIGNIFICANT CHANGE UP (ref 3.3–5)
ALP SERPL-CCNC: 73 U/L — SIGNIFICANT CHANGE UP (ref 40–120)
ALT FLD-CCNC: 101 U/L — HIGH (ref 12–78)
ANION GAP SERPL CALC-SCNC: 9 MMOL/L — SIGNIFICANT CHANGE UP (ref 5–17)
AST SERPL-CCNC: 86 U/L — HIGH (ref 15–37)
BILIRUB SERPL-MCNC: 0.6 MG/DL — SIGNIFICANT CHANGE UP (ref 0.2–1.2)
BUN SERPL-MCNC: 9 MG/DL — SIGNIFICANT CHANGE UP (ref 7–23)
CALCIUM SERPL-MCNC: 8.9 MG/DL — SIGNIFICANT CHANGE UP (ref 8.5–10.1)
CHLORIDE SERPL-SCNC: 109 MMOL/L — HIGH (ref 96–108)
CO2 SERPL-SCNC: 22 MMOL/L — SIGNIFICANT CHANGE UP (ref 22–31)
CREAT SERPL-MCNC: 0.9 MG/DL — SIGNIFICANT CHANGE UP (ref 0.5–1.3)
EGFR: 100 ML/MIN/1.73M2 — SIGNIFICANT CHANGE UP
GLUCOSE SERPL-MCNC: 68 MG/DL — LOW (ref 70–99)
MAGNESIUM SERPL-MCNC: 1.7 MG/DL — SIGNIFICANT CHANGE UP (ref 1.6–2.6)
PHOSPHATE SERPL-MCNC: 2.9 MG/DL — SIGNIFICANT CHANGE UP (ref 2.5–4.5)
POTASSIUM SERPL-MCNC: 3.9 MMOL/L — SIGNIFICANT CHANGE UP (ref 3.5–5.3)
POTASSIUM SERPL-SCNC: 3.9 MMOL/L — SIGNIFICANT CHANGE UP (ref 3.5–5.3)
PROT SERPL-MCNC: 7.6 GM/DL — SIGNIFICANT CHANGE UP (ref 6–8.3)
SODIUM SERPL-SCNC: 140 MMOL/L — SIGNIFICANT CHANGE UP (ref 135–145)

## 2024-01-23 PROCEDURE — 99232 SBSQ HOSP IP/OBS MODERATE 35: CPT

## 2024-01-23 RX ADMIN — PANTOPRAZOLE SODIUM 40 MILLIGRAM(S): 20 TABLET, DELAYED RELEASE ORAL at 05:04

## 2024-01-23 RX ADMIN — Medication 130 MILLIGRAM(S): at 03:02

## 2024-01-23 RX ADMIN — Medication 1 GRAM(S): at 17:37

## 2024-01-23 RX ADMIN — HEPARIN SODIUM 5000 UNIT(S): 5000 INJECTION INTRAVENOUS; SUBCUTANEOUS at 17:35

## 2024-01-23 RX ADMIN — Medication 32.4 MILLIGRAM(S): at 17:34

## 2024-01-23 RX ADMIN — Medication 130 MILLIGRAM(S): at 12:23

## 2024-01-23 RX ADMIN — Medication 1 GRAM(S): at 11:57

## 2024-01-23 RX ADMIN — Medication 3 MILLIGRAM(S): at 21:14

## 2024-01-23 RX ADMIN — Medication 32.4 MILLIGRAM(S): at 05:04

## 2024-01-23 RX ADMIN — Medication 1 MILLIGRAM(S): at 11:56

## 2024-01-23 RX ADMIN — Medication 130 MILLIGRAM(S): at 08:36

## 2024-01-23 RX ADMIN — Medication 130 MILLIGRAM(S): at 22:26

## 2024-01-23 RX ADMIN — Medication 1 GRAM(S): at 23:44

## 2024-01-23 RX ADMIN — Medication 1 GRAM(S): at 05:04

## 2024-01-23 RX ADMIN — HEPARIN SODIUM 5000 UNIT(S): 5000 INJECTION INTRAVENOUS; SUBCUTANEOUS at 05:04

## 2024-01-23 RX ADMIN — ATORVASTATIN CALCIUM 20 MILLIGRAM(S): 80 TABLET, FILM COATED ORAL at 21:13

## 2024-01-23 RX ADMIN — Medication 1 TABLET(S): at 11:56

## 2024-01-24 DIAGNOSIS — R63.8 OTHER SYMPTOMS AND SIGNS CONCERNING FOOD AND FLUID INTAKE: ICD-10-CM

## 2024-01-24 DIAGNOSIS — R74.8 ABNORMAL LEVELS OF OTHER SERUM ENZYMES: ICD-10-CM

## 2024-01-24 DIAGNOSIS — E78.5 HYPERLIPIDEMIA, UNSPECIFIED: ICD-10-CM

## 2024-01-24 DIAGNOSIS — R94.31 ABNORMAL ELECTROCARDIOGRAM [ECG] [EKG]: ICD-10-CM

## 2024-01-24 DIAGNOSIS — D69.6 THROMBOCYTOPENIA, UNSPECIFIED: ICD-10-CM

## 2024-01-24 PROCEDURE — 99233 SBSQ HOSP IP/OBS HIGH 50: CPT

## 2024-01-24 RX ORDER — PHENOBARBITAL 60 MG
130 TABLET ORAL
Refills: 0 | Status: DISCONTINUED | OUTPATIENT
Start: 2024-01-24 | End: 2024-01-26

## 2024-01-24 RX ORDER — PHENOBARBITAL 60 MG
260 TABLET ORAL ONCE
Refills: 0 | Status: DISCONTINUED | OUTPATIENT
Start: 2024-01-24 | End: 2024-01-24

## 2024-01-24 RX ADMIN — Medication 130 MILLIGRAM(S): at 12:01

## 2024-01-24 RX ADMIN — Medication 1 TABLET(S): at 11:15

## 2024-01-24 RX ADMIN — Medication 1 GRAM(S): at 11:15

## 2024-01-24 RX ADMIN — Medication 1 MILLIGRAM(S): at 11:15

## 2024-01-24 RX ADMIN — Medication 1 GRAM(S): at 18:21

## 2024-01-24 RX ADMIN — Medication 3 MILLIGRAM(S): at 21:06

## 2024-01-24 RX ADMIN — Medication 130 MILLIGRAM(S): at 06:47

## 2024-01-24 RX ADMIN — Medication 130 MILLIGRAM(S): at 21:43

## 2024-01-24 RX ADMIN — Medication 130 MILLIGRAM(S): at 23:40

## 2024-01-24 RX ADMIN — Medication 32.4 MILLIGRAM(S): at 18:21

## 2024-01-24 RX ADMIN — HEPARIN SODIUM 5000 UNIT(S): 5000 INJECTION INTRAVENOUS; SUBCUTANEOUS at 06:20

## 2024-01-24 RX ADMIN — HEPARIN SODIUM 5000 UNIT(S): 5000 INJECTION INTRAVENOUS; SUBCUTANEOUS at 18:35

## 2024-01-24 RX ADMIN — Medication 1 GRAM(S): at 23:49

## 2024-01-24 RX ADMIN — PANTOPRAZOLE SODIUM 40 MILLIGRAM(S): 20 TABLET, DELAYED RELEASE ORAL at 06:20

## 2024-01-24 RX ADMIN — Medication 1 GRAM(S): at 06:20

## 2024-01-24 RX ADMIN — ATORVASTATIN CALCIUM 20 MILLIGRAM(S): 80 TABLET, FILM COATED ORAL at 21:05

## 2024-01-24 RX ADMIN — Medication 260 MILLIGRAM(S): at 12:34

## 2024-01-24 RX ADMIN — Medication 32.4 MILLIGRAM(S): at 06:20

## 2024-01-24 NOTE — PROGRESS NOTE ADULT - PROBLEM SELECTOR PLAN 1
- pt with known hx ETOH use d/o, multiple admissions in past for w/d; last drink 3d prior to arrival; drinks whiskey daily  - in ETOH w/d on arrival with tremors   - on CIWA protocol  - c/w phenobarbital taper   - encourage cessation - pt not interested   - SBIRT eval  - seizure/aspiration/fall precautions

## 2024-01-25 LAB
ANION GAP SERPL CALC-SCNC: 8 MMOL/L — SIGNIFICANT CHANGE UP (ref 5–17)
BUN SERPL-MCNC: 9 MG/DL — SIGNIFICANT CHANGE UP (ref 7–23)
CALCIUM SERPL-MCNC: 8.9 MG/DL — SIGNIFICANT CHANGE UP (ref 8.5–10.1)
CHLORIDE SERPL-SCNC: 107 MMOL/L — SIGNIFICANT CHANGE UP (ref 96–108)
CO2 SERPL-SCNC: 25 MMOL/L — SIGNIFICANT CHANGE UP (ref 22–31)
CREAT SERPL-MCNC: 1.01 MG/DL — SIGNIFICANT CHANGE UP (ref 0.5–1.3)
EGFR: 87 ML/MIN/1.73M2 — SIGNIFICANT CHANGE UP
FOLATE SERPL-MCNC: 18.3 NG/ML — SIGNIFICANT CHANGE UP
GLUCOSE SERPL-MCNC: 78 MG/DL — SIGNIFICANT CHANGE UP (ref 70–99)
HCT VFR BLD CALC: 39.2 % — SIGNIFICANT CHANGE UP (ref 39–50)
HGB BLD-MCNC: 12.3 G/DL — LOW (ref 13–17)
MAGNESIUM SERPL-MCNC: 1.6 MG/DL — SIGNIFICANT CHANGE UP (ref 1.6–2.6)
MCHC RBC-ENTMCNC: 24.8 PG — LOW (ref 27–34)
MCHC RBC-ENTMCNC: 31.4 G/DL — LOW (ref 32–36)
MCV RBC AUTO: 79.2 FL — LOW (ref 80–100)
NRBC # BLD: 0 /100 WBCS — SIGNIFICANT CHANGE UP (ref 0–0)
PHOSPHATE SERPL-MCNC: 1.9 MG/DL — LOW (ref 2.5–4.5)
PLATELET # BLD AUTO: 241 K/UL — SIGNIFICANT CHANGE UP (ref 150–400)
POTASSIUM SERPL-MCNC: 3.1 MMOL/L — LOW (ref 3.5–5.3)
POTASSIUM SERPL-SCNC: 3.1 MMOL/L — LOW (ref 3.5–5.3)
RBC # BLD: 4.95 M/UL — SIGNIFICANT CHANGE UP (ref 4.2–5.8)
RBC # FLD: 21.9 % — HIGH (ref 10.3–14.5)
SODIUM SERPL-SCNC: 140 MMOL/L — SIGNIFICANT CHANGE UP (ref 135–145)
TSH SERPL-MCNC: 0.69 UIU/ML — SIGNIFICANT CHANGE UP (ref 0.36–3.74)
VIT B12 SERPL-MCNC: 1157 PG/ML — SIGNIFICANT CHANGE UP (ref 232–1245)
WBC # BLD: 4.91 K/UL — SIGNIFICANT CHANGE UP (ref 3.8–10.5)
WBC # FLD AUTO: 4.91 K/UL — SIGNIFICANT CHANGE UP (ref 3.8–10.5)

## 2024-01-25 PROCEDURE — 99232 SBSQ HOSP IP/OBS MODERATE 35: CPT

## 2024-01-25 RX ORDER — MAGNESIUM SULFATE 500 MG/ML
1 VIAL (ML) INJECTION ONCE
Refills: 0 | Status: DISCONTINUED | OUTPATIENT
Start: 2024-01-25 | End: 2024-01-25

## 2024-01-25 RX ORDER — MAGNESIUM OXIDE 400 MG ORAL TABLET 241.3 MG
400 TABLET ORAL
Refills: 0 | Status: COMPLETED | OUTPATIENT
Start: 2024-01-25 | End: 2024-01-28

## 2024-01-25 RX ORDER — POTASSIUM CHLORIDE 20 MEQ
40 PACKET (EA) ORAL EVERY 4 HOURS
Refills: 0 | Status: COMPLETED | OUTPATIENT
Start: 2024-01-25 | End: 2024-01-25

## 2024-01-25 RX ADMIN — HEPARIN SODIUM 5000 UNIT(S): 5000 INJECTION INTRAVENOUS; SUBCUTANEOUS at 18:18

## 2024-01-25 RX ADMIN — Medication 1 GRAM(S): at 18:18

## 2024-01-25 RX ADMIN — Medication 32.4 MILLIGRAM(S): at 06:01

## 2024-01-25 RX ADMIN — Medication 130 MILLIGRAM(S): at 19:54

## 2024-01-25 RX ADMIN — MAGNESIUM OXIDE 400 MG ORAL TABLET 400 MILLIGRAM(S): 241.3 TABLET ORAL at 12:39

## 2024-01-25 RX ADMIN — Medication 1 GRAM(S): at 23:53

## 2024-01-25 RX ADMIN — Medication 130 MILLIGRAM(S): at 04:39

## 2024-01-25 RX ADMIN — ATORVASTATIN CALCIUM 20 MILLIGRAM(S): 80 TABLET, FILM COATED ORAL at 23:53

## 2024-01-25 RX ADMIN — Medication 130 MILLIGRAM(S): at 09:51

## 2024-01-25 RX ADMIN — Medication 130 MILLIGRAM(S): at 06:40

## 2024-01-25 RX ADMIN — Medication 32.4 MILLIGRAM(S): at 18:18

## 2024-01-25 RX ADMIN — HEPARIN SODIUM 5000 UNIT(S): 5000 INJECTION INTRAVENOUS; SUBCUTANEOUS at 06:01

## 2024-01-25 RX ADMIN — Medication 40 MILLIEQUIVALENT(S): at 14:58

## 2024-01-25 RX ADMIN — Medication 40 MILLIEQUIVALENT(S): at 09:51

## 2024-01-25 RX ADMIN — MAGNESIUM OXIDE 400 MG ORAL TABLET 400 MILLIGRAM(S): 241.3 TABLET ORAL at 18:19

## 2024-01-25 RX ADMIN — PANTOPRAZOLE SODIUM 40 MILLIGRAM(S): 20 TABLET, DELAYED RELEASE ORAL at 06:02

## 2024-01-25 NOTE — DIETITIAN INITIAL EVALUATION ADULT - PERTINENT LABORATORY DATA
01-25    140  |  107  |  9   ----------------------------<  78  3.1<L>   |  25  |  1.01    Ca    8.9      25 Jan 2024 07:47  Phos  1.9     01-25  Mg     1.6     01-25    POCT Blood Glucose.: 96 mg/dL (01-24-24 @ 17:42)  A1C with Estimated Average Glucose Result: 5.3 % (07-18-23 @ 04:10)

## 2024-01-25 NOTE — DIETITIAN INITIAL EVALUATION ADULT - PERTINENT MEDS FT
MEDICATIONS  (STANDING):  atorvastatin 20 milliGRAM(s) Oral at bedtime  folic acid 1 milliGRAM(s) Oral daily  heparin   Injectable 5000 Unit(s) SubCutaneous every 12 hours  magnesium oxide 400 milliGRAM(s) Oral three times a day with meals  multivitamin 1 Tablet(s) Oral daily  pantoprazole    Tablet 40 milliGRAM(s) Oral before breakfast  PHENobarbital 32.4 milliGRAM(s) Oral every 12 hours  potassium chloride    Tablet ER 40 milliEquivalent(s) Oral every 4 hours  sucralfate 1 Gram(s) Oral four times a day    MEDICATIONS  (PRN):  acetaminophen     Tablet .. 650 milliGRAM(s) Oral every 6 hours PRN Temp greater or equal to 38C (100.4F), Mild Pain (1 - 3)  aluminum hydroxide/magnesium hydroxide/simethicone Suspension 30 milliLiter(s) Oral every 4 hours PRN Dyspepsia  melatonin 3 milliGRAM(s) Oral at bedtime PRN Insomnia  ondansetron Injectable 4 milliGRAM(s) IV Push every 8 hours PRN Nausea and/or Vomiting  PHENobarbital Injectable 130 milliGRAM(s) IntraMuscular every 2 hours PRN ciwa score>7

## 2024-01-25 NOTE — DIETITIAN INITIAL EVALUATION ADULT - OTHER INFO
Pt seen on medical floor for Length Of Stay ,  adm w/ alcohol dependence w/ high risk for withdrawal ; ROSA. Elevated lipase ; transaminitis in setting of ETOH ; Gerd; microcytic anemia ; thrombocytopenia ; HLD. Pt asleep and unable to perform NFPE at this time. He lives at home w/ his brother. No reports of N/V/D/C/Chewing/Swallowing issues, No food allergies

## 2024-01-25 NOTE — DIETITIAN NUTRITION RISK NOTIFICATION - TREATMENT: THE FOLLOWING DIET HAS BEEN RECOMMENDED
Diet, Regular:   Supplement Feeding Modality:  Oral  Ensure Plus High Protein Cans or Servings Per Day:  1       Frequency:  Two Times a day (01-25-24 @ 14:22) [Pending Verification By Attending]  Diet, Regular (01-18-24 @ 22:39) [Active]

## 2024-01-25 NOTE — PROGRESS NOTE ADULT - PROBLEM SELECTOR PLAN 1
- pt with known hx ETOH use d/o, multiple admissions in past for w/d; last drink 3d prior to arrival; drinks whiskey daily  - in ETOH w/d on arrival with tremors   - on CIWA protocol  - c/w phenobarbital taper as ordered  - encourage cessation   - SBIRT eval  - seizure/aspiration/fall precautions

## 2024-01-26 LAB
ANION GAP SERPL CALC-SCNC: 12 MMOL/L — SIGNIFICANT CHANGE UP (ref 5–17)
BUN SERPL-MCNC: 11 MG/DL — SIGNIFICANT CHANGE UP (ref 7–23)
CALCIUM SERPL-MCNC: 9.6 MG/DL — SIGNIFICANT CHANGE UP (ref 8.5–10.1)
CHLORIDE SERPL-SCNC: 114 MMOL/L — HIGH (ref 96–108)
CO2 SERPL-SCNC: 18 MMOL/L — LOW (ref 22–31)
CREAT SERPL-MCNC: 0.84 MG/DL — SIGNIFICANT CHANGE UP (ref 0.5–1.3)
EGFR: 102 ML/MIN/1.73M2 — SIGNIFICANT CHANGE UP
GLUCOSE SERPL-MCNC: 67 MG/DL — LOW (ref 70–99)
HCT VFR BLD CALC: 38.6 % — LOW (ref 39–50)
HGB BLD-MCNC: 12 G/DL — LOW (ref 13–17)
MAGNESIUM SERPL-MCNC: 2.1 MG/DL — SIGNIFICANT CHANGE UP (ref 1.6–2.6)
MCHC RBC-ENTMCNC: 24.1 PG — LOW (ref 27–34)
MCHC RBC-ENTMCNC: 31.1 G/DL — LOW (ref 32–36)
MCV RBC AUTO: 77.7 FL — LOW (ref 80–100)
NRBC # BLD: 0 /100 WBCS — SIGNIFICANT CHANGE UP (ref 0–0)
PHOSPHATE SERPL-MCNC: 2.7 MG/DL — SIGNIFICANT CHANGE UP (ref 2.5–4.5)
PLATELET # BLD AUTO: 197 K/UL — SIGNIFICANT CHANGE UP (ref 150–400)
POTASSIUM SERPL-MCNC: 4.2 MMOL/L — SIGNIFICANT CHANGE UP (ref 3.5–5.3)
POTASSIUM SERPL-SCNC: 4.2 MMOL/L — SIGNIFICANT CHANGE UP (ref 3.5–5.3)
RBC # BLD: 4.97 M/UL — SIGNIFICANT CHANGE UP (ref 4.2–5.8)
RBC # FLD: 22.2 % — HIGH (ref 10.3–14.5)
SODIUM SERPL-SCNC: 144 MMOL/L — SIGNIFICANT CHANGE UP (ref 135–145)
WBC # BLD: 4.6 K/UL — SIGNIFICANT CHANGE UP (ref 3.8–10.5)
WBC # FLD AUTO: 4.6 K/UL — SIGNIFICANT CHANGE UP (ref 3.8–10.5)

## 2024-01-26 PROCEDURE — 99232 SBSQ HOSP IP/OBS MODERATE 35: CPT

## 2024-01-26 RX ORDER — PHENOBARBITAL 60 MG
130 TABLET ORAL
Refills: 0 | Status: DISCONTINUED | OUTPATIENT
Start: 2024-01-26 | End: 2024-01-27

## 2024-01-26 RX ORDER — THIAMINE MONONITRATE (VIT B1) 100 MG
100 TABLET ORAL DAILY
Refills: 0 | Status: DISCONTINUED | OUTPATIENT
Start: 2024-01-26 | End: 2024-01-27

## 2024-01-26 RX ADMIN — Medication 32.4 MILLIGRAM(S): at 05:00

## 2024-01-26 RX ADMIN — PANTOPRAZOLE SODIUM 40 MILLIGRAM(S): 20 TABLET, DELAYED RELEASE ORAL at 05:08

## 2024-01-26 RX ADMIN — Medication 1 GRAM(S): at 17:03

## 2024-01-26 RX ADMIN — Medication 3 MILLIGRAM(S): at 22:05

## 2024-01-26 RX ADMIN — Medication 130 MILLIGRAM(S): at 03:28

## 2024-01-26 RX ADMIN — Medication 1 GRAM(S): at 05:01

## 2024-01-26 RX ADMIN — HEPARIN SODIUM 5000 UNIT(S): 5000 INJECTION INTRAVENOUS; SUBCUTANEOUS at 05:01

## 2024-01-26 RX ADMIN — MAGNESIUM OXIDE 400 MG ORAL TABLET 400 MILLIGRAM(S): 241.3 TABLET ORAL at 17:02

## 2024-01-26 RX ADMIN — Medication 130 MILLIGRAM(S): at 17:04

## 2024-01-26 RX ADMIN — HEPARIN SODIUM 5000 UNIT(S): 5000 INJECTION INTRAVENOUS; SUBCUTANEOUS at 17:09

## 2024-01-26 RX ADMIN — Medication 130 MILLIGRAM(S): at 06:46

## 2024-01-26 RX ADMIN — Medication 130 MILLIGRAM(S): at 08:53

## 2024-01-26 RX ADMIN — Medication 32.4 MILLIGRAM(S): at 18:23

## 2024-01-26 RX ADMIN — Medication 130 MILLIGRAM(S): at 01:15

## 2024-01-26 RX ADMIN — ATORVASTATIN CALCIUM 20 MILLIGRAM(S): 80 TABLET, FILM COATED ORAL at 22:05

## 2024-01-26 RX ADMIN — Medication 100 MILLIGRAM(S): at 17:04

## 2024-01-27 LAB
ANION GAP SERPL CALC-SCNC: 8 MMOL/L — SIGNIFICANT CHANGE UP (ref 5–17)
BUN SERPL-MCNC: 11 MG/DL — SIGNIFICANT CHANGE UP (ref 7–23)
CALCIUM SERPL-MCNC: 9.4 MG/DL — SIGNIFICANT CHANGE UP (ref 8.5–10.1)
CHLORIDE SERPL-SCNC: 111 MMOL/L — HIGH (ref 96–108)
CO2 SERPL-SCNC: 20 MMOL/L — LOW (ref 22–31)
CREAT SERPL-MCNC: 0.85 MG/DL — SIGNIFICANT CHANGE UP (ref 0.5–1.3)
EGFR: 102 ML/MIN/1.73M2 — SIGNIFICANT CHANGE UP
GLUCOSE SERPL-MCNC: 76 MG/DL — SIGNIFICANT CHANGE UP (ref 70–99)
HCT VFR BLD CALC: 41.1 % — SIGNIFICANT CHANGE UP (ref 39–50)
HGB BLD-MCNC: 12.4 G/DL — LOW (ref 13–17)
MCHC RBC-ENTMCNC: 24.3 PG — LOW (ref 27–34)
MCHC RBC-ENTMCNC: 30.2 G/DL — LOW (ref 32–36)
MCV RBC AUTO: 80.6 FL — SIGNIFICANT CHANGE UP (ref 80–100)
NRBC # BLD: 0 /100 WBCS — SIGNIFICANT CHANGE UP (ref 0–0)
PLATELET # BLD AUTO: 292 K/UL — SIGNIFICANT CHANGE UP (ref 150–400)
POTASSIUM SERPL-MCNC: 3.8 MMOL/L — SIGNIFICANT CHANGE UP (ref 3.5–5.3)
POTASSIUM SERPL-SCNC: 3.8 MMOL/L — SIGNIFICANT CHANGE UP (ref 3.5–5.3)
RBC # BLD: 5.1 M/UL — SIGNIFICANT CHANGE UP (ref 4.2–5.8)
RBC # FLD: 21.9 % — HIGH (ref 10.3–14.5)
SODIUM SERPL-SCNC: 139 MMOL/L — SIGNIFICANT CHANGE UP (ref 135–145)
WBC # BLD: 4.57 K/UL — SIGNIFICANT CHANGE UP (ref 3.8–10.5)
WBC # FLD AUTO: 4.57 K/UL — SIGNIFICANT CHANGE UP (ref 3.8–10.5)

## 2024-01-27 PROCEDURE — 99232 SBSQ HOSP IP/OBS MODERATE 35: CPT

## 2024-01-27 PROCEDURE — 93010 ELECTROCARDIOGRAM REPORT: CPT

## 2024-01-27 PROCEDURE — 76937 US GUIDE VASCULAR ACCESS: CPT | Mod: 26,59

## 2024-01-27 PROCEDURE — 36000 PLACE NEEDLE IN VEIN: CPT

## 2024-01-27 RX ORDER — OLANZAPINE 15 MG/1
2.5 TABLET, FILM COATED ORAL EVERY 6 HOURS
Refills: 0 | Status: DISCONTINUED | OUTPATIENT
Start: 2024-01-27 | End: 2024-02-01

## 2024-01-27 RX ORDER — THIAMINE MONONITRATE (VIT B1) 100 MG
500 TABLET ORAL DAILY
Refills: 0 | Status: DISCONTINUED | OUTPATIENT
Start: 2024-01-27 | End: 2024-01-29

## 2024-01-27 RX ORDER — QUETIAPINE FUMARATE 200 MG/1
25 TABLET, FILM COATED ORAL AT BEDTIME
Refills: 0 | Status: DISCONTINUED | OUTPATIENT
Start: 2024-01-27 | End: 2024-01-27

## 2024-01-27 RX ORDER — OLANZAPINE 15 MG/1
2.5 TABLET, FILM COATED ORAL EVERY 6 HOURS
Refills: 0 | Status: DISCONTINUED | OUTPATIENT
Start: 2024-01-27 | End: 2024-01-27

## 2024-01-27 RX ORDER — PHENOBARBITAL 60 MG
130 TABLET ORAL ONCE
Refills: 0 | Status: DISCONTINUED | OUTPATIENT
Start: 2024-01-27 | End: 2024-01-27

## 2024-01-27 RX ADMIN — Medication 32.4 MILLIGRAM(S): at 05:27

## 2024-01-27 RX ADMIN — Medication 130 MILLIGRAM(S): at 20:02

## 2024-01-27 RX ADMIN — Medication 1 GRAM(S): at 00:58

## 2024-01-27 RX ADMIN — HEPARIN SODIUM 5000 UNIT(S): 5000 INJECTION INTRAVENOUS; SUBCUTANEOUS at 05:32

## 2024-01-27 RX ADMIN — Medication 1 GRAM(S): at 05:27

## 2024-01-27 RX ADMIN — Medication 130 MILLIGRAM(S): at 03:52

## 2024-01-27 RX ADMIN — Medication 3 MILLIGRAM(S): at 23:05

## 2024-01-27 RX ADMIN — OLANZAPINE 2.5 MILLIGRAM(S): 15 TABLET, FILM COATED ORAL at 22:38

## 2024-01-27 RX ADMIN — Medication 130 MILLIGRAM(S): at 10:02

## 2024-01-27 RX ADMIN — ATORVASTATIN CALCIUM 20 MILLIGRAM(S): 80 TABLET, FILM COATED ORAL at 23:04

## 2024-01-27 RX ADMIN — PANTOPRAZOLE SODIUM 40 MILLIGRAM(S): 20 TABLET, DELAYED RELEASE ORAL at 05:38

## 2024-01-27 RX ADMIN — OLANZAPINE 2.5 MILLIGRAM(S): 15 TABLET, FILM COATED ORAL at 17:09

## 2024-01-27 RX ADMIN — Medication 1 GRAM(S): at 23:04

## 2024-01-27 RX ADMIN — Medication 105 MILLIGRAM(S): at 12:40

## 2024-01-27 NOTE — PROCEDURE NOTE - ADDITIONAL PROCEDURE DETAILS
Indication poor/ no peripheral IV access.      Findings: compressible right upper extremity vein. Using direct US guidance, under clean conditions, a long 20Ga peripheral catheter introduced into vessel.  US performed after procedure, catheter in vein, no complications.     Impression:  successful US guided RUE  PIV placement.

## 2024-01-27 NOTE — CHART NOTE - NSCHARTNOTEFT_GEN_A_CORE
Requested Assistance by medicine PA for IV access in patient that is agitated with no present peripheral IV access for medication administration. Long 20 G IV placed in RUE under ultrasound guidance. see procedure note.

## 2024-01-27 NOTE — CHART NOTE - NSCHARTNOTEFT_GEN_A_CORE
Hospitalist Medicine PA    CC: Called by RN to evaluate pt with c/o extreme agitation  Patient is disoriented at this time. Inappropriate response to all questions.     Stefan Degroot is a 58 year old male with PMHx of EtOH abuse and GERD secondary to gastritis who presented to the ED on 1/18/24 for complaints of epigastric pain and hand tremors.    History is limited as patient is a poor historian. Patient reports he has had abdominal pain for the past few days and today, the pain got so severe he could not eat his roti for dinner. Denies radiation of pain to back. Associated two episodes of emesis. Did not try analgesics for pain at home. Also started having bilateral hand tremors two days ago. Of note, he typically drinks 1 glass of whiskey on most days for 35 years. Last drink was three days ago. Chart review reveals multiple prior admissions for similar complaints. Most recent admission from 12/31/23 - 1/1/24. Admitted for alcohol withdrawal. Started on CIWA protocol.     REVIEW OF SYSTEMS:  Respiratory: Denies cough, wheezing, chills, hemoptysis, shortness of breath, difficulty breathing  Cardiovascular: Denies chest pain, palpitations, dizziness, leg swelling  Gastrointestinal: Denies abdominal pain, nausea, vomiting, hematemesis, diarrhea, constipation, melena, hematochezia  Genitourinary: Denies dysuria, frequency, hematuria, retention, incontinence  Neurological: Denies headaches, memory loss, loss of strength, numbness, tremors    VITALS:  T(C): 36.8 (01-27-24 @ 05:01), Max: 37.7 (01-26-24 @ 23:32)  HR: 100 (01-27-24 @ 05:01) (71 - 100)  BP: 112/77 (01-27-24 @ 05:01) (112/77 - 157/89)  RR: 18 (01-27-24 @ 05:01) (18 - 18)  SpO2: 93% (01-27-24 @ 05:01) (92% - 95%)      PLAN:  - ICU called to assit with peripheral IV access.  - Continue current treatment Hospitalist Medicine PA    CC: Called by RN to evaluate pt with c/o extreme agitation  Patient is disoriented at this time. Inappropriate response to all questions.  phone utilized. Per , patient with incoherent speech, just shouting "their trying to kill me help me help me". Patient last  received phenobarbital 130mg Im x1 at 10:02am. Patient without peripheral access at this time. ICU called to assist with IV access. MICU team presented to bedside. Per ICU this is less likely DT's. Patient well known to team. Patient's mental status waxes and wanes. Recommendation to add medication for agitation.    Stefan Degroot is a 58 year old male with PMHx of EtOH abuse and GERD secondary to gastritis who presented to the ED on 1/18/24 for complaints of epigastric pain and hand tremors.  Last drink was three days ago. Chart review reveals multiple prior admissions for similar complaints. Most recent admission from 12/31/23 - 1/1/24. Admitted for alcohol withdrawal. Started on CIWA protocol.     REVIEW OF SYSTEMS:  Respiratory: Denies cough, wheezing, chills, hemoptysis, shortness of breath, difficulty breathing  Cardiovascular: Denies chest pain, palpitations, dizziness, leg swelling  Gastrointestinal: Denies abdominal pain, nausea, vomiting, hematemesis, diarrhea, constipation, melena, hematochezia  Genitourinary: Denies dysuria, frequency, hematuria, retention, incontinence  Neurological: Denies headaches, memory loss, loss of strength, numbness, tremors    VITALS:  T(C): 36.8 (01-27-24 @ 05:01), Max: 37.7 (01-26-24 @ 23:32)  HR: 100 (01-27-24 @ 05:01) (71 - 100)  BP: 112/77 (01-27-24 @ 05:01) (112/77 - 157/89)  RR: 18 (01-27-24 @ 05:01) (18 - 18)  SpO2: 93% (01-27-24 @ 05:01) (92% - 95%)      PLAN:  - ICU called to assist with peripheral IV access.  - Continue current treatment  - Dr Valderrama made aware  - Add Zyprexa 2.5 mg Q6 PRN for agitation  _ Add QHS Seroquel 25 mg PO Hospitalist Medicine PA    CC: Called by RN to evaluate pt with c/o extreme agitation  Patient is disoriented at this time. Inappropriate response to all questions.  phone utilized. Per , patient with incoherent speech, just shouting "their trying to kill me help me help me". Patient last  received phenobarbital 130mg Im x1 at 10:02am. Patient without peripheral access at this time. ICU called to assist with IV access. MICU team presented to bedside. Per ICU this is less likely DT's. Patient well known to team. Patient's mental status waxes and wanes. Recommendation to add medication for agitation.    Stefan Degroot is a 58 year old male with PMHx of EtOH abuse and GERD secondary to gastritis who presented to the ED on 1/18/24 for complaints of epigastric pain and hand tremors.  Last drink was three days ago. Chart review reveals multiple prior admissions for similar complaints. Most recent admission from 12/31/23 - 1/1/24. Admitted for alcohol withdrawal. Started on CIWA protocol.     REVIEW OF SYSTEMS:  Respiratory: Denies cough, wheezing, chills, hemoptysis, shortness of breath, difficulty breathing  Cardiovascular: Denies chest pain, palpitations, dizziness, leg swelling  Gastrointestinal: Denies abdominal pain, nausea, vomiting, hematemesis, diarrhea, constipation, melena, hematochezia  Genitourinary: Denies dysuria, frequency, hematuria, retention, incontinence  Neurological: Denies headaches, memory loss, loss of strength, numbness, tremors    VITALS:  T(C): 36.8 (01-27-24 @ 05:01), Max: 37.7 (01-26-24 @ 23:32)  HR: 100 (01-27-24 @ 05:01) (71 - 100)  BP: 112/77 (01-27-24 @ 05:01) (112/77 - 157/89)  RR: 18 (01-27-24 @ 05:01) (18 - 18)  SpO2: 93% (01-27-24 @ 05:01) (92% - 95%)      PLAN:  - ICU called to assist with peripheral IV access.  - Continue current treatment  - Dr Valderrama made aware  - Add Zyprexa 2.5 mg Q6 PRN for agitation  _-Add QHS Seroquel 25 mg PO  -IV Thiamine 500mg x 3 doses

## 2024-01-27 NOTE — PROGRESS NOTE ADULT - PROBLEM SELECTOR PLAN 1
- pt with known hx ETOH use d/o, multiple admissions in past for w/d; last drink 3d prior to arrival; drinks whiskey daily  - in ETOH w/d on arrival with tremors   - on CIWA protocol    - completing phenobarbital taper on 1/29  - added Zyprexa PRN  - F/u CTH non con

## 2024-01-27 NOTE — PROCEDURE NOTE - NSPROCDETAILS_GEN_ALL_CORE
dynamic US guidance/blood seen on insertion/dressing applied/flushes easily/secured in place
blood seen on insertion/dressing applied/flushes easily/secured in place/sterile technique, catheter placed

## 2024-01-28 LAB — MAGNESIUM SERPL-MCNC: 1.9 MG/DL — SIGNIFICANT CHANGE UP (ref 1.6–2.6)

## 2024-01-28 PROCEDURE — 70450 CT HEAD/BRAIN W/O DYE: CPT | Mod: 26

## 2024-01-28 PROCEDURE — 99232 SBSQ HOSP IP/OBS MODERATE 35: CPT

## 2024-01-28 RX ADMIN — MAGNESIUM OXIDE 400 MG ORAL TABLET 400 MILLIGRAM(S): 241.3 TABLET ORAL at 12:24

## 2024-01-28 RX ADMIN — HEPARIN SODIUM 5000 UNIT(S): 5000 INJECTION INTRAVENOUS; SUBCUTANEOUS at 05:18

## 2024-01-28 RX ADMIN — Medication 1 TABLET(S): at 12:24

## 2024-01-28 RX ADMIN — Medication 3 MILLIGRAM(S): at 21:21

## 2024-01-28 RX ADMIN — OLANZAPINE 2.5 MILLIGRAM(S): 15 TABLET, FILM COATED ORAL at 21:21

## 2024-01-28 RX ADMIN — OLANZAPINE 2.5 MILLIGRAM(S): 15 TABLET, FILM COATED ORAL at 06:49

## 2024-01-28 RX ADMIN — Medication 32.4 MILLIGRAM(S): at 05:19

## 2024-01-28 RX ADMIN — Medication 1 MILLIGRAM(S): at 12:25

## 2024-01-28 RX ADMIN — Medication 105 MILLIGRAM(S): at 12:25

## 2024-01-28 RX ADMIN — Medication 1 GRAM(S): at 05:18

## 2024-01-28 RX ADMIN — PANTOPRAZOLE SODIUM 40 MILLIGRAM(S): 20 TABLET, DELAYED RELEASE ORAL at 05:18

## 2024-01-28 RX ADMIN — HEPARIN SODIUM 5000 UNIT(S): 5000 INJECTION INTRAVENOUS; SUBCUTANEOUS at 18:26

## 2024-01-28 RX ADMIN — Medication 32.4 MILLIGRAM(S): at 20:06

## 2024-01-28 RX ADMIN — ATORVASTATIN CALCIUM 20 MILLIGRAM(S): 80 TABLET, FILM COATED ORAL at 21:21

## 2024-01-28 RX ADMIN — Medication 1 GRAM(S): at 12:23

## 2024-01-28 NOTE — PROGRESS NOTE ADULT - PROBLEM SELECTOR PLAN 1
- pt with known hx ETOH use d/o, multiple admissions in past for w/d; last drink 3d prior to arrival; drinks whiskey daily  - in ETOH w/d on arrival with tremors   - on CIWA protocol    - completing phenobarbital taper on 1/29  - added Zyprexa PRN  - F/u CTH non con- negative for acute fndings

## 2024-01-29 PROCEDURE — 99222 1ST HOSP IP/OBS MODERATE 55: CPT

## 2024-01-29 PROCEDURE — 99232 SBSQ HOSP IP/OBS MODERATE 35: CPT

## 2024-01-29 RX ADMIN — Medication 1 GRAM(S): at 06:26

## 2024-01-29 RX ADMIN — Medication 3 MILLIGRAM(S): at 21:15

## 2024-01-29 RX ADMIN — OLANZAPINE 2.5 MILLIGRAM(S): 15 TABLET, FILM COATED ORAL at 20:24

## 2024-01-29 RX ADMIN — PANTOPRAZOLE SODIUM 40 MILLIGRAM(S): 20 TABLET, DELAYED RELEASE ORAL at 06:26

## 2024-01-29 RX ADMIN — Medication 1 MILLIGRAM(S): at 12:23

## 2024-01-29 RX ADMIN — HEPARIN SODIUM 5000 UNIT(S): 5000 INJECTION INTRAVENOUS; SUBCUTANEOUS at 18:34

## 2024-01-29 RX ADMIN — Medication 32.4 MILLIGRAM(S): at 06:25

## 2024-01-29 RX ADMIN — ATORVASTATIN CALCIUM 20 MILLIGRAM(S): 80 TABLET, FILM COATED ORAL at 21:15

## 2024-01-29 RX ADMIN — Medication 1 GRAM(S): at 12:24

## 2024-01-29 RX ADMIN — Medication 1 TABLET(S): at 12:23

## 2024-01-29 RX ADMIN — Medication 1 GRAM(S): at 18:35

## 2024-01-29 RX ADMIN — HEPARIN SODIUM 5000 UNIT(S): 5000 INJECTION INTRAVENOUS; SUBCUTANEOUS at 06:26

## 2024-01-29 RX ADMIN — OLANZAPINE 2.5 MILLIGRAM(S): 15 TABLET, FILM COATED ORAL at 08:18

## 2024-01-29 NOTE — PROGRESS NOTE ADULT - PROBLEM SELECTOR PLAN 1
- pt with known hx ETOH use d/o, multiple admissions in past for w/d; last drink 3d prior to arrival; drinks whiskey daily  - in ETOH w/d on arrival with tremors   - Discontinued phenobarb  - completed phenobarbital taper   - added Zyprexa PRN  - F/u CTH non con- negative for acute findings    - Appreciate  recs

## 2024-01-29 NOTE — BH CONSULTATION LIAISON ASSESSMENT NOTE - NSBHCONSULTRECOMMENDOTHER_PSY_A_CORE FT
- discontinue phenobarb as it is NOT alcohol withdrawal anymore after 11 days   - Patient is confused and agitated from his system being overloaded with numerous agents given for a prolonged period of time   - NEEDS A WASH OUT

## 2024-01-29 NOTE — BH CONSULTATION LIAISON ASSESSMENT NOTE - SUMMARY
Decades of heavy alcohol use disorder with low motivation to achieve sobriety/abstinence with chronic medical sequela resulting in frequenct ED presentations and medical admissions. Patient again overmedicated with prolonged CIWA protocol which should have been done in 5 days and with use of ONE agent.

## 2024-01-29 NOTE — BH CONSULTATION LIAISON ASSESSMENT NOTE - MSE UNSTRUCTURED FT
awake, alert but disoriented to time, situation, aware he is in the hospital, poor attention, confused and makes illogical statements such as "I see the black there"

## 2024-01-29 NOTE — BH CONSULTATION LIAISON ASSESSMENT NOTE - CURRENT MEDICATION
MEDICATIONS  (STANDING):  atorvastatin 20 milliGRAM(s) Oral at bedtime  heparin   Injectable 5000 Unit(s) SubCutaneous every 12 hours  pantoprazole    Tablet 40 milliGRAM(s) Oral before breakfast  sucralfate 1 Gram(s) Oral four times a day    MEDICATIONS  (PRN):  acetaminophen     Tablet .. 650 milliGRAM(s) Oral every 6 hours PRN Temp greater or equal to 38C (100.4F), Mild Pain (1 - 3)  aluminum hydroxide/magnesium hydroxide/simethicone Suspension 30 milliLiter(s) Oral every 4 hours PRN Dyspepsia  melatonin 3 milliGRAM(s) Oral at bedtime PRN Insomnia  OLANZapine Injectable 2.5 milliGRAM(s) IntraMuscular every 6 hours PRN Agitation  ondansetron Injectable 4 milliGRAM(s) IV Push every 8 hours PRN Nausea and/or Vomiting

## 2024-01-29 NOTE — BH CONSULTATION LIAISON ASSESSMENT NOTE - TELEPSYCHIATRY?
Next appt NONE  Last appt 12/13/19     Refill request for  amLODIPine (NORVASC) 5 MG tablet 30 tablet 3 12/13/2019     Sig - Route: Take 1 tablet by mouth daily. - Oral        Change of pharmacy.     
No

## 2024-01-29 NOTE — BH CONSULTATION LIAISON ASSESSMENT NOTE - HPI (INCLUDE ILLNESS QUALITY, SEVERITY, DURATION, TIMING, CONTEXT, MODIFYING FACTORS, ASSOCIATED SIGNS AND SYMPTOMS)
PATIENT KNOWN TO WRITER FROM PRIOR ADMISSIONS; COMPLEX CARE CASE: 59 yo [place] male, lives with his son in a private home, works as a , with long history of continuous Alcohol Abuse spanning decades (at most reports 2-3 bottles of vodka 1-2/day), with associated numerous ED visits and hospital admissions (ie. epigastric pain, vomiting, nausea, hematemesis, aspiration pneumonia, esophageal ulcer, UGI bleed, MSSA aspiration PNA, ICU admissions), has had BALs > 400 before, MANY	ED presentations usually for GI complaints and usually in state of alcohol intoxication, gets frequently medically admitted who returns 1/18/2024 c/p N/V onset this AM a/w h/a, abd pain. Pt said he has not drank alcohol since hospital d/c 4 days ago. BAL < 10. Started on alcohol withdrawal protocol - unclear why Patient has been here for 11 days now, treated with a variety of agents/routes and but is still given phenobarb 32.4mg po bid since 1/22/24 without changes. Patient remains confused.

## 2024-01-29 NOTE — BH CONSULTATION LIAISON ASSESSMENT NOTE - MARITAL STATUS
Patient:   REYES FLORENTINO, SAMUEL            MRN: [id number]            FIN: [id number]              Age:   33 years     Sex:  MALE     :  87   Associated Diagnoses:   None   Author:   MATY JALLOH     Chief Complaint   33M w/ PMHx including recent ruptured appendicitis s/p appendectomy, presents to the ER for concerns of abd pains and nausea/ vomiting.     History of Present Illness             The patient presents with 33M w/ PMHx including recent ruptured appendicitis s/p appendectomy, presents to the ER for concerns of abd pains and nausea/ vomiting.  Pt said he was here approximately 2wks ago for his appendix, and he went home and had been doing ok.  This AM around 6:30am, developed diffused abd pains and also episodes of nausea w/ NB/NB emesis.  No diarrhea.  No dark/bloody stool.  No fevers or chills.  No HA/ vision  changes/ numbness/ tingling/ weakness.  No CP/ palpitations/ swelling.  No SOB/ cough/ wheezing.  No dysuria/ hematuria/ increased urinary frequency or urgency.  Denies any other symptoms..       Review of Systems   Constitutional:  No fever, No chills.    Eye:  Negative.    Ear/Nose/Mouth/Throat:  Negative.    Cardiovascular:  No chest pain, No palpitations, No peripheral edema.   Respiratory:  No shortness of breath, No cough, No wheezing.    Gastrointestinal:  Nausea, Vomiting, Abdominal pain, No diarrhea.   Genitourinary:  No dysuria, No hematuria, No urinary frequency, No urinary urgency.   Musculoskeletal:  Negative.    Integumentary:  Negative.    Hematology/Lymphatics:  Negative.    Neurologic:  No numbness, No tingling, No headache.    Endocrine:  Negative.    Allergy/Immunologic:  Negative.    Psychiatric:  Negative.    All other systems All other systems are negative.     Histories   Past Med History: Past Medical History   Hx ruptured appendicitis s/p Appendectomy   Family History:    Entire family history is negative., MOTHER: No known hx heart disease/ HTN/ HLD/ or  DM  FATHER: No known hx heart disease/ HTN/ HLD/ or DM   Procedure History:    No active procedure history items have been selected or recorded., No qualifying data available.     Social History       Tobacco: never  EtOH: Occasional on the weekends; said he drinks EtOH 1-2x per week.  Denies hx of heavy EtOH, denies daily/frequent EtOH.   Illicits: none.       Health Status   Allergies:    Allergic Reactions (All)  NKA  No Known Medication Allergies, Allergies (ST)   Allergies (2) Active Reaction  NKA None Documented  No Known Medication Allergies None Documented    Current medications:  (Selected)   Inpatient Medications  Ordered  Normal Saline IV 1000 mL: 1,000 mL, IV, RATE: 125 mL/hr, Infuse over 8 hr, 1,000 mL TOTAL Volume, Routine, Order Start: 10/23/19 18:20:00 CDT, IV Soln, Weight: 59 kg Clinical Weight  metoCLOPramide injection 5 mg/mL (Reglan): 10 mg, Slow IV Push, One Time (unscheduled), Routine, Order Start: 10/23/19 18:45:00 CDT, Injection  piperacillin-tazobactam intermittent infusion [30 minute] (Zosyn).: 3.375 gm, IVPB, One Time (unscheduled), Rationale: Empiric (suspected infection), Indication: Intra-abdominal infection, Infuse over 30 minutes, Routine, Order Start: 10/23/19 18:45:00 CDT, x 1 doses, Infusion, For ED  Prescriptions  Prescribed  Flagyl oral 500 mg tablet: 500 mg = 1 tab, Oral, Q8H, Tab, # 21 tab, 0 Refills, Maintenance  Norco oral 325-5 mg tablet: = 1 tab, Oral, Q4H, PRN incisional pain, Tab, # 10 tab, 0 Refills, Maintenance  ciprofloxacin oral 500 mg tablet (Cipro): 500 mg = 1 tab, Oral, Q12H, Tab, # 14 tab, 0 Refills, Maintenance,   Medications (3) Active  Scheduled: (2)  metoCLOPramide  10 mg, Slow IV Push, One Time (unscheduled)  piperacillin-tazobactam  3.375 gm, IVPB, One Time (unscheduled)  Continuous: (1)  Sodium Chloride 0.9% 1000 mL  1,000 mL, IV, 125 mL/hr  PRN: (0)  , Medications (3) Active  Scheduled: (2)  metoCLOPramide  10 mg, Slow IV Push, One Time  (unscheduled)  piperacillin-tazobactam  3.375 gm, IVPB, One Time (unscheduled)  Continuous: (1)  Sodium Chloride 0.9% 1000 mL  1,000 mL, IV, 125 mL/hr  PRN: (0)        Physical Examination   VS/Measurements       Vitals between:   22-OCT-2019 19:57:48   TO   23-OCT-2019 19:57:48                   LAST RESULT MINIMUM MAXIMUM  Temperature 36.4 36.4 36.4  Heart Rate 89 71 95  Respiratory Rate 22 16 22  NISBP           106 105 118  NIDBP           81 71 90  NIMBP           90 82 99  SpO2                    97 95 99    General:  Alert and oriented, No acute distress, A&Ox4.    Eye:  Pupils are equal, round and reactive to light, Extraocular movements are intact.   HENT:  Normocephalic, Oral mucosa is moist.    Neck:  Supple, Non-tender.    Respiratory:  Lungs are clear to auscultation, Respirations are non-labored, Breath sounds are equal, Symmetrical chest wall expansion.   Cardiovascular:  Normal rate, Regular rhythm, No edema.         Arterial pulses: Bilateral, Radial, Dorsalis pedis, 2+.         Capillary refill: Bilateral, Upper extremity, Lower extremity, Less than 2 seconds.   Gastrointestinal:  Soft, Non-distended.         Tenderness: Mild, Generalized.         Guarding: Negative.         Rebound: Negative.         Bowel sounds: Normal.    Genitourinary:  No costovertebral angle tenderness.    Lymphatics:  No lymphadenopathy neck, axilla, groin.    Musculoskeletal:  Normal range of motion, Normal strength, No tenderness, No swelling.   Integumentary:  Warm, Dry, Pink, no cyanosis.    Neurologic:  Alert, Oriented, No focal deficits, Cranial Nerves II-XII are grossly intact.   Cognition and Speech:  Oriented, Speech clear and coherent.    Psychiatric:  Cooperative, Appropriate mood & affect.      Review / Management   Laboratory results:       Labs between:  22-OCT-2019 18:56 to 23-OCT-2019 18:56    CBC:                 WBC  HgB  Hct  Plt  MCV  RDW   23-OCT-2019 (H) 15.4  16.4  48.4  346  87.2  12.6     DIFF:                  Seg  Neutroph//ABS  Lymph//ABS  Mono//ABS  EOS/ABS  23-OCT-2019 NOT APPLICABLE  87 // (H) 13.1  8 // 1.3 5 // 0.8 0 // 0.1    BMP:                 Na  Cl  BUN  Glu   23-OCT-2019 138  101  14  99                              K  CO2  Cr  Ca                              3.6  28  0.76  8.6     CMP:                 AST  ALT  AlkPhos  Bili  Albumin   23-OCT-2019 30  36  86  0.5  4.1                  ,   Labs between:  22-OCT-2019 18:56 to 23-OCT-2019 18:56    CBC:                 WBC  HgB  Hct  Plt  MCV  RDW   23-OCT-2019 (H) 15.4  16.4  48.4  346  87.2  12.6     DIFF:                 Seg  Neutroph//ABS  Lymph//ABS  Mono//ABS  EOS/ABS  23-OCT-2019 NOT APPLICABLE  87 // (H) 13.1  8 // 1.3 5 // 0.8 0 // 0.1    BMP:                 Na  Cl  BUN  Glu   23-OCT-2019 138  101  14  99                              K  CO2  Cr  Ca                              3.6  28  0.76  8.6     CMP:                 AST  ALT  AlkPhos  Bili  Albumin   23-OCT-2019 30  36  86  0.5  4.1                  Medications (3) Active  Scheduled: (2)  metoCLOPramide  10 mg, Slow IV Push, One Time (unscheduled)  piperacillin-tazobactam  3.375 gm, IVPB, One Time (unscheduled)  Continuous: (1)  Sodium Chloride 0.9% 1000 mL  1,000 mL, IV, 125 mL/hr  PRN: (0)   .    Radiology results     Result title:  CT ABDOMEN AND PELVIS W CON  Result status:  Final  Verified by:  WILBUR ORELLANA on 10/23/2019 17:37  IMPRESSION:1.  Dilated fluid-filled loops of small bowel with decompressed distal small bowel loops.  Findings may represent partial or early small bowel obstruction with small bowel ileus (secondary to enterocolitis) not excluded.  Recommend minimum of abdominal x-ray follow-up to track progression of contrast into the colon.2.  Small free fluid or ascites in the pelvis.3.  Prior appendectomy.FOR PHYSICIAN USE ONLY - Please note that this  report was generated using voice recognition software.  If you require clarification or feel that there has  been an error in this report please contact me through [employer].  Thank you very much for allowing me to participate in the care of your patient.      Impression and Plan   Dx and Plan:  Diagnosis     33M w/ PMHx including recent ruptured appendicitis s/p appendectomy, presents to the ER for concerns of abd pains and nausea/ vomiting.    Diffuse Abdominal Pain w/ Nausea and Vomiting  Acute SBO  Hx recent appendicitis w/ rupture s/p laparoscopic appendectomy on 10/6/19  Leukocytosis  -CT abd pelv: Dilated fluid-filled loops of small bowel w/ decompressed distal small bowel loops.  Findings may represent partial or early SBO w/ small bowel ileus (2/2 enterocolitis) not excluded.  Small free fluid or ascites in the pelvis.   -WBC 15.4, possibly reactive due to nausea/vomiting and SBO symptoms.   No clear source of infection.  Vitals ok currently.  -NG tube placed in the ER  -NPO, IVF, pain control PRN  -Empiric abx w/ zosyn started, repeat CBC  -Surgery consulted, appreciate recommendations    DVT PPX: SCDs for now pending evaluation by surgery    Full code, discussed w/ pt    I certify Inpatient admission with expected length of stay of over 2 midnights due to medically necessary hospital care for: the above medical issues..    .   Single

## 2024-01-30 PROCEDURE — 99232 SBSQ HOSP IP/OBS MODERATE 35: CPT

## 2024-01-30 PROCEDURE — 99231 SBSQ HOSP IP/OBS SF/LOW 25: CPT

## 2024-01-30 RX ORDER — MAGNESIUM HYDROXIDE 400 MG/1
30 TABLET, CHEWABLE ORAL DAILY
Refills: 0 | Status: DISCONTINUED | OUTPATIENT
Start: 2024-01-30 | End: 2024-02-01

## 2024-01-30 RX ORDER — POLYETHYLENE GLYCOL 3350 17 G/17G
17 POWDER, FOR SOLUTION ORAL ONCE
Refills: 0 | Status: COMPLETED | OUTPATIENT
Start: 2024-01-30 | End: 2024-01-30

## 2024-01-30 RX ADMIN — POLYETHYLENE GLYCOL 3350 17 GRAM(S): 17 POWDER, FOR SOLUTION ORAL at 18:13

## 2024-01-30 RX ADMIN — HEPARIN SODIUM 5000 UNIT(S): 5000 INJECTION INTRAVENOUS; SUBCUTANEOUS at 05:11

## 2024-01-30 RX ADMIN — Medication 3 MILLIGRAM(S): at 22:32

## 2024-01-30 RX ADMIN — Medication 1 GRAM(S): at 00:09

## 2024-01-30 RX ADMIN — Medication 1 GRAM(S): at 05:12

## 2024-01-30 RX ADMIN — Medication 1 GRAM(S): at 12:11

## 2024-01-30 RX ADMIN — PANTOPRAZOLE SODIUM 40 MILLIGRAM(S): 20 TABLET, DELAYED RELEASE ORAL at 05:12

## 2024-01-30 RX ADMIN — Medication 650 MILLIGRAM(S): at 22:36

## 2024-01-30 RX ADMIN — ATORVASTATIN CALCIUM 20 MILLIGRAM(S): 80 TABLET, FILM COATED ORAL at 22:32

## 2024-01-30 RX ADMIN — HEPARIN SODIUM 5000 UNIT(S): 5000 INJECTION INTRAVENOUS; SUBCUTANEOUS at 18:12

## 2024-01-30 RX ADMIN — Medication 1 GRAM(S): at 18:14

## 2024-01-30 RX ADMIN — Medication 650 MILLIGRAM(S): at 23:36

## 2024-01-30 RX ADMIN — Medication 1 GRAM(S): at 22:33

## 2024-01-30 NOTE — PHYSICAL THERAPY INITIAL EVALUATION ADULT - ADDITIONAL COMMENTS
Pt states before the admission he is independent and ambulation and ADLs without using assistive device

## 2024-01-30 NOTE — PHYSICAL THERAPY INITIAL EVALUATION ADULT - IMPAIRMENTS FOUND, PT EVAL
aerobic capacity/endurance/arousal, attention, and cognition/ergonomics and body mechanics/gait, locomotion, and balance/gross motor/muscle strength/poor safety awareness

## 2024-01-30 NOTE — PHYSICAL THERAPY INITIAL EVALUATION ADULT - PERTINENT HX OF CURRENT PROBLEM, REHAB EVAL
Pt is admitted with dx Uncomplicated Alcohol withrawal. Pmhx Gastritis, PUD, Thrombocytopenia, Microcytic Anemia.

## 2024-01-30 NOTE — PHYSICAL THERAPY INITIAL EVALUATION ADULT - STRENGTHENING, PT EVAL
Improve strength in the UE and LE to 5/5, improve gen endurance to good  and be able to perform functional tasks-bed mobility, sitting, standing, transfers and ambulate in a safe manner with  assistive device and prevent falls.

## 2024-01-30 NOTE — PHYSICAL THERAPY INITIAL EVALUATION ADULT - NSPTDISCHREC_GEN_A_CORE
Pt. presents with strength deficits in the extremities, decreased general endurance, difficulty in bed mobility and transfers, unsteady standing and ambulation balance, fall risk./Sub-acute Rehab

## 2024-01-30 NOTE — PHYSICAL THERAPY INITIAL EVALUATION ADULT - LIVES WITH, PROFILE
Pt states he lives in a pvt house with a few family members, has 5 steps with rails to enter the house.

## 2024-01-30 NOTE — PHYSICAL THERAPY INITIAL EVALUATION ADULT - GENERAL OBSERVATIONS, REHAB EVAL
Pt found supine, sleeping but was awaken, cooperative, follow simple instructions, on room air, denies pain.

## 2024-01-31 PROCEDURE — 99232 SBSQ HOSP IP/OBS MODERATE 35: CPT

## 2024-01-31 RX ADMIN — Medication 1 GRAM(S): at 07:05

## 2024-01-31 RX ADMIN — HEPARIN SODIUM 5000 UNIT(S): 5000 INJECTION INTRAVENOUS; SUBCUTANEOUS at 07:05

## 2024-01-31 RX ADMIN — Medication 1 GRAM(S): at 11:24

## 2024-01-31 RX ADMIN — Medication 3 MILLIGRAM(S): at 22:22

## 2024-01-31 RX ADMIN — ATORVASTATIN CALCIUM 20 MILLIGRAM(S): 80 TABLET, FILM COATED ORAL at 22:21

## 2024-01-31 RX ADMIN — PANTOPRAZOLE SODIUM 40 MILLIGRAM(S): 20 TABLET, DELAYED RELEASE ORAL at 07:05

## 2024-01-31 RX ADMIN — Medication 650 MILLIGRAM(S): at 23:21

## 2024-01-31 RX ADMIN — Medication 1 GRAM(S): at 17:38

## 2024-01-31 RX ADMIN — HEPARIN SODIUM 5000 UNIT(S): 5000 INJECTION INTRAVENOUS; SUBCUTANEOUS at 17:38

## 2024-01-31 RX ADMIN — Medication 650 MILLIGRAM(S): at 22:21

## 2024-01-31 NOTE — CONSULT NOTE ADULT - CRITICAL CARE ATTENDING COMMENT
Satisfactory 56M w/ chronic EtOH abuse and multiple admissions for DTs, alcoholic gastritis, multiple episodes of aspiration pna requiring intubations. Presents w/ abdominal pain and vomiting. Admitted to medical floors for withdrawal symptoms  and was started on antibiotics for possible partotitis. Today pt became increasingly agitated and code grey was called for severe agitation. Pt transferred to ICU for severe alcohol withdrawal / aggressive behavior.     - continue w/ phenobarb 130 q6 w/ PRN doses; continue w/ precedex for agitation  - airway monitoring while sedated, EtCO2 in place  - noted to have parotid and submandibular swelling and redness on exam, was started on abx empirically for possible partitits - CT revealed no acute findings - discussed w/ ID and will d/c all abx for now  - hypoMg, hypoPhos repleted; monitor electrolytes closely  - keep NPO, protonix   - folic acid and thiamine  - monitor in ICU while withdrawing  - lovenox for DVT ppx  - full code

## 2024-02-01 ENCOUNTER — TRANSCRIPTION ENCOUNTER (OUTPATIENT)
Age: 59
End: 2024-02-01

## 2024-02-01 VITALS
DIASTOLIC BLOOD PRESSURE: 84 MMHG | RESPIRATION RATE: 16 BRPM | SYSTOLIC BLOOD PRESSURE: 132 MMHG | HEART RATE: 81 BPM | TEMPERATURE: 98 F | OXYGEN SATURATION: 98 %

## 2024-02-01 PROCEDURE — 99239 HOSP IP/OBS DSCHRG MGMT >30: CPT

## 2024-02-01 PROCEDURE — 73501 X-RAY EXAM HIP UNI 1 VIEW: CPT | Mod: 26,LT

## 2024-02-01 PROCEDURE — 99231 SBSQ HOSP IP/OBS SF/LOW 25: CPT

## 2024-02-01 RX ORDER — SUCRALFATE 1 G
1 TABLET ORAL
Qty: 0 | Refills: 0 | DISCHARGE
Start: 2024-02-01

## 2024-02-01 RX ADMIN — Medication 1 GRAM(S): at 06:22

## 2024-02-01 RX ADMIN — HEPARIN SODIUM 5000 UNIT(S): 5000 INJECTION INTRAVENOUS; SUBCUTANEOUS at 06:22

## 2024-02-01 RX ADMIN — PANTOPRAZOLE SODIUM 40 MILLIGRAM(S): 20 TABLET, DELAYED RELEASE ORAL at 06:23

## 2024-02-01 RX ADMIN — Medication 1 GRAM(S): at 01:57

## 2024-02-01 NOTE — PROGRESS NOTE ADULT - PROBLEM SELECTOR PLAN 8
- c/w home atorvastatin 20mg daily

## 2024-02-01 NOTE — BH CONSULTATION LIAISON PROGRESS NOTE - NSBHFUPINTERVALHXFT_PSY_A_CORE
Patient c/o some right hip pain on 2/1/24 which was likely due to prolonged laying in bed - xray done, unremarkable (reviewed by Writer) with Patient ambulating normally on the Unit with normal weight bearing. Patient's mental status with vast improvement and back to baseline. Actively socializing with another patient on the unit and walking around. Calm, cooperative, polite, pleasant and says that his sons are at [employer] in class at this time but his wife is at home and can let him in. Also willing to pay for car service for himself, Patient requesting to go home at this time. 
Patient's mental status is better - awake, alert, more able to maintain attention and engage in a meaningful conversation. Patient today reported that he is constipated and would like a stool softener by mouth and not an enema. Also subjectively feels better.

## 2024-02-01 NOTE — PROGRESS NOTE ADULT - PROBLEM SELECTOR PLAN 9
- F: none  - E: supplement K<4, Mg<2  - N: DASH/TLC  - D: hep sq  - G: protonix 40mg daily    code: full  dispo: pending PT evaluation
- F: none  - E: replete K<4, Mg<2  - N: DASH/TLC  - D: hep sq  - G: protonix 40mg daily    code: full  dispo: pending medical optimization
- F: none  - E: supplement K<4, Mg<2  - N: DASH/TLC  - D: hep sq  - G: protonix 40mg daily    code: full  dispo: pending PT evaluation
- F: none  - E: supplement K<4, Mg<2  - N: DASH/TLC  - D: hep sq  - G: protonix 40mg daily    code: full  dispo: pending medical optimization
- F: none  - E: supplement K<4, Mg<2  - N: DASH/TLC  - D: hep sq  - G: protonix 40mg daily    code: full  dispo: pending PT evaluation

## 2024-02-01 NOTE — DISCHARGE NOTE PROVIDER - REASON FOR ADMISSION
alcohol-induced gastritis, alcohol withdrawal  Alcohol dependence with high risk for withdrawal, ROSA

## 2024-02-01 NOTE — DISCHARGE NOTE PROVIDER - NSDCMRMEDTOKEN_GEN_ALL_CORE_FT
atorvastatin 20 mg oral tablet: 1 tab(s) orally once a day  pantoprazole 40 mg oral delayed release tablet: 1 tab(s) orally once a day (before a meal) Date filled 12/20/23, 30 days  sucralfate 1 g oral tablet: 1 tab(s) orally 4 times a day

## 2024-02-01 NOTE — BH CONSULTATION LIAISON PROGRESS NOTE - NSBHCHARTREVIEWVS_PSY_A_CORE FT
Vital Signs Last 24 Hrs  T(C): 36.8 (30 Jan 2024 15:50), Max: 37.1 (30 Jan 2024 05:45)  T(F): 98.3 (30 Jan 2024 15:50), Max: 98.8 (30 Jan 2024 05:45)  HR: 81 (30 Jan 2024 15:50) (77 - 100)  BP: 113/75 (30 Jan 2024 15:50) (108/73 - 122/85)  BP(mean): --  RR: 17 (30 Jan 2024 15:50) (17 - 18)  SpO2: 97% (30 Jan 2024 15:50) (91% - 97%)    Parameters below as of 30 Jan 2024 15:50  Patient On (Oxygen Delivery Method): room air    
Vital Signs Last 24 Hrs  T(C): 37.2 (01 Feb 2024 12:32), Max: 37.4 (31 Jan 2024 23:09)  T(F): 98.9 (01 Feb 2024 12:32), Max: 99.3 (31 Jan 2024 23:09)  HR: 88 (01 Feb 2024 12:32) (67 - 88)  BP: 102/67 (01 Feb 2024 12:32) (102/67 - 127/87)  BP(mean): --  RR: 17 (01 Feb 2024 12:32) (16 - 18)  SpO2: 98% (01 Feb 2024 12:32) (98% - 98%)

## 2024-02-01 NOTE — DISCHARGE NOTE PROVIDER - NSDCCPCAREPLAN_GEN_ALL_CORE_FT
PRINCIPAL DISCHARGE DIAGNOSIS  Diagnosis: Alcoholic gastritis  Assessment and Plan of Treatment:      PRINCIPAL DISCHARGE DIAGNOSIS  Diagnosis: Alcohol dependence with withdrawal  Assessment and Plan of Treatment:       SECONDARY DISCHARGE DIAGNOSES  Diagnosis: GERD (gastroesophageal reflux disease)  Assessment and Plan of Treatment:     Diagnosis: Acute pancreatitis  Assessment and Plan of Treatment: in the setting of alcohol abuse    Diagnosis: Acute liver failure  Assessment and Plan of Treatment: setting of alcohol abuse    Diagnosis: Microcytic anemia  Assessment and Plan of Treatment:     Diagnosis: Severe thrombocytopenia  Assessment and Plan of Treatment:     Diagnosis: Hyperlipidemia  Assessment and Plan of Treatment:

## 2024-02-01 NOTE — DISCHARGE NOTE PROVIDER - HOSPITAL COURSE
PATIENT KNOWN TO WRITER FROM PRIOR ADMISSIONS; COMPLEX CARE CASE: 57 yo [place]  [place] male, lives with his son in a private home, works as a , with long history of continuous Alcohol Abuse spanning decades (at most reports 2-3 bottles of vodka 1-2/day), with associated numerous ED visits and hospital admissions (ie. epigastric pain, vomiting, nausea, hematemesis, aspiration pneumonia, esophageal ulcer, UGI bleed, MSSA aspiration PNA, ICU admissions), has had BALs > 400 before, MANY	ED presentations usually for GI complaints and usually in state of alcohol intoxication, gets frequently medically admitted who returns 1/18/2024 c/p N/V onset this AM a/w h/a, abd pain. Pt said he has not drank alcohol since hospital d/c 4 days ago. BAL < 10. Started on alcohol withdrawal protocol - unclear why Patient has been here for 11 days now, treated with a variety of agents/routes and but is still given phenobarb 32.4mg po bid since 1/22/24 without changes. Patient was confused; phenobarb discontinued with subsequent vast improvement in mental status. Patient c/o some right hip pain on 2/1/24 which was likely due to prolonged laying in bed - xray done, unremarkable with Patient ambulating normally ion the Unit with normal weight bearing.    PATIENT KNOWN TO WRITER FROM PRIOR ADMISSIONS; COMPLEX CARE CASE: 59 yo [place]  [place] male, lives with his son in a private home, works as a , with long history of continuous Alcohol Abuse spanning decades (at most reports 2-3 bottles of vodka 1-2/day), with associated numerous ED visits and hospital admissions (ie. epigastric pain, vomiting, nausea, hematemesis, aspiration pneumonia, esophageal ulcer, UGI bleed, MSSA aspiration PNA, ICU admissions), has had BALs > 400 before, MANY	ED presentations usually for GI complaints and usually in state of alcohol intoxication, gets frequently medically admitted who returns 1/18/2024 c/p N/V onset this AM a/w h/a, abd pain. Pt said he has not drank alcohol since hospital d/c 4 days ago. BAL < 10. Started on alcohol withdrawal protocol - unclear why Patient has been here for 11 days now, treated with a variety of agents/routes and but is still given phenobarb 32.4mg po bid since 1/22/24 without changes. Patient was confused; phenobarb discontinued with subsequent vast improvement in mental status. Patient c/o some right hip pain on 2/1/24 which was likely due to prolonged laying in bed - xray done, unremarkable with Patient ambulating normally in the Unit with normal weight bearing.   Patient HD stable, optimized for discharge home.  Case discussed with Dr Valderrama on 2/1/24 agrees with plan.

## 2024-02-01 NOTE — PROGRESS NOTE ADULT - PROBLEM SELECTOR PROBLEM 9
Nutrition, metabolism, and development symptoms
Yes

## 2024-02-01 NOTE — DISCHARGE NOTE NURSING/CASE MANAGEMENT/SOCIAL WORK - PATIENT PORTAL LINK FT
You can access the FollowMyHealth Patient Portal offered by [facility] by registering at the following website: [email or web address]. By joining [facility]’s FollowMyHealth portal, you will also be able to view your health information using other applications (apps) compatible with our system.

## 2024-02-01 NOTE — PROGRESS NOTE ADULT - REASON FOR ADMISSION
Alcohol dependence with high risk for withdrawal, ROSA

## 2024-02-01 NOTE — BH CONSULTATION LIAISON PROGRESS NOTE - NSBHCONSULTFOLLOWAFTERCARE_PSY_A_CORE FT
as per primary team once medically cleared and mental status returns to baseline 
as per primary team once medically cleared and mental status returns to baseline

## 2024-02-01 NOTE — DISCHARGE NOTE NURSING/CASE MANAGEMENT/SOCIAL WORK - NSDCVIVACCINE_GEN_ALL_CORE_FT
COVID-19, mRNA, LNP-S, PF, 100 mcg/ 0.5 mL dose (Moderna); 10-Jovan-2021 15:38; Reji Hernandez (RN); [employer] [id number] (Exp. Date: 13-Jul-2021); IntraMuscular; Deltoid Right.; 0.5 milliLiter(s);   influenza, injectable, quadrivalent, preservative free; 31-Jan-2019 15:53; Ayaka Bynum (RN); [employer]; [id number] (Exp. Date: 30-Jun-2019); IntraMuscular; Deltoid Right.; 0.5 milliLiter(s); VIS (VIS Published: 07-Aug-2015, VIS Presented: 31-Jan-2019);   influenza, injectable, quadrivalent, preservative free; 10-Nov-2019 14:13; Laverne Lane (RN); [employer]; [id number] (Exp. Date: 30-Jun-2020); IntraMuscular; Deltoid Left.; 0.5 milliLiter(s); VIS (VIS Published: 15-Aug-2019, VIS Presented: 10-Nov-2019);   influenza, injectable, quadrivalent, preservative free; 13-Oct-2020 11:56; Kimberli Cronin (RN); [employer]; [id number] (Exp. Date: 30-Jun-2021); IntraMuscular; Deltoid Right.; 0.5 milliLiter(s); VIS (VIS Published: 15-Aug-2019, VIS Presented: 13-Oct-2020);   Td (adult) preservative free; 18-Jan-2020 20:04; Yulia Vance (RN); [employer]; [id number] (Exp. Date: 19-Jan-2021); IntraMuscular; Deltoid Right.; 0.5 milliLiter(s); VIS (VIS Published: 18-Jan-2020, VIS Presented: 18-Jan-2020);

## 2024-02-01 NOTE — BH CONSULTATION LIAISON PROGRESS NOTE - NSBHMSERECMEM_PSY_A_CORE
Normal Complex Repair And Graft Additional Text (Will Appearing After The Standard Complex Repair Text): The complex repair was not sufficient to completely close the primary defect. The remaining additional defect was repaired with the graft mentioned below.

## 2024-02-01 NOTE — PROGRESS NOTE ADULT - PROBLEM SELECTOR PLAN 4
- c/w home protonix 40mg daily  - sucralfate q6h

## 2024-02-01 NOTE — PROGRESS NOTE ADULT - PROBLEM SELECTOR PROBLEM 2
Elevated lipase

## 2024-02-01 NOTE — CHART NOTE - NSCHARTNOTEFT_GEN_A_CORE
Pt c many admissions for long-term ETOH use; admitted this time for same.  Pt lives c son; mostly independent c ADLs. No diet restrictions followed at home; wt has fluctuated over year or so but basically stable.  No reports of any N/V/C/D or chew/swallowing issues.      Factors impacting intake: [ ] none [ ] nausea  [ ] vomiting [ ] diarrhea [ ] constipation  [ ]chewing problems [ ] swallowing issues  [X ] other: AMS    Diet Prescription:   Easy to Chew + Ensure Plus High Protein x 2/day (provides 700 kcal, 40 g protein)   Intake:   Pt averaging 50-75% most meals; sometimes less; drinking some of supplement    Current Weight: 79.4 kg (2/1); admission wt 79.4 kg (1/18)  % Weight Change: wt stable x 2 weeks    No edema noted    Pertinent Medications: MEDICATIONS  (STANDING):  atorvastatin 20 milliGRAM(s) Oral at bedtime  heparin   Injectable 5000 Unit(s) SubCutaneous every 12 hours  pantoprazole    Tablet 40 milliGRAM(s) Oral before breakfast  sucralfate 1 Gram(s) Oral four times a day    MEDICATIONS  (PRN):  acetaminophen     Tablet .. 650 milliGRAM(s) Oral every 6 hours PRN Temp greater or equal to 38C (100.4F), Mild Pain (1 - 3)  aluminum hydroxide/magnesium hydroxide/simethicone Suspension 30 milliLiter(s) Oral every 4 hours PRN Dyspepsia  magnesium hydroxide Suspension 30 milliLiter(s) Oral daily PRN Constipation  melatonin 3 milliGRAM(s) Oral at bedtime PRN Insomnia  OLANZapine Injectable 2.5 milliGRAM(s) IntraMuscular every 6 hours PRN Agitation  ondansetron Injectable 4 milliGRAM(s) IV Push every 8 hours PRN Nausea and/or Vomiting    Pertinent Labs:  01-26 Phos 2.7 mg/dL    Skin:   no pressure ulcers noted    Estimated Needs:   [ X] no change since previous assessment (1/25)  [ ] recalculated:     Previous Nutrition Diagnosis:   [X ] Moderate Malnutrition in context of acute illness  Etiology:  Inadequate energy/protein intake related to ETOH withdrawal  Signs & Symptoms:  >2% wt loss x 1 week; <75% nutrition needs > 7 days    Nutrition Diagnosis is [ ] ongoing  [ ] resolved [ X] not applicable     New Nutrition Diagnosis:  Inadequate Energy Intake  Etiology:  Decreased ability to consume sufficient energy  Signs & Symptoms:  Pt consuming <75% most meals, AMS    GOAL:  Pt to consume >75% meals/supplements during remainder of LOS      Interventions:   continue current diet rx as noted  Recommend  [ ] Change Diet To:  [ ] Nutrition Supplement  [ ] Nutrition Support  [ ] Other:     Monitoring and Evaluation:   [x ] PO intake [ x ] Tolerance to diet prescription [ x ] weights [ x ] labs[ x ] follow up per protocol  [ ] other:

## 2024-02-01 NOTE — PROGRESS NOTE ADULT - PROBLEM SELECTOR PLAN 6
- suspect in setting of ETOH use d/o, transaminitis, and likely component of cirrhosis   - continue to trend  - no active s/s bleeding/bruising
- plt 122   - suspect in setting of ETOH use d/o, transaminitis, and likely component of cirrhosis   - continue to trend  - no active s/s bleeding/bruising
- suspect in setting of ETOH use d/o, transaminitis, and likely component of cirrhosis   - continue to trend  - no active s/s bleeding/bruising

## 2024-02-01 NOTE — PROGRESS NOTE ADULT - PROBLEM SELECTOR PLAN 2
- lipase 99 on arrival  - abd exam benign and tolerating PO intake  - low c/f pancreatitis

## 2024-02-01 NOTE — PROGRESS NOTE ADULT - PROBLEM SELECTOR PLAN 3
- likely in setting of ETOH use d/o   - outpt f/u
- AST 86,   - likely in setting of ETOH use d/o   - continue to trend  - outpt f/u
- likely in setting of ETOH use d/o   - outpt f/u

## 2024-02-01 NOTE — DISCHARGE NOTE PROVIDER - DETAILS OF MALNUTRITION DIAGNOSIS/DIAGNOSES
This patient has been assessed with a concern for Malnutrition and was treated during this hospitalization for the following Nutrition diagnosis/diagnoses:     -  01/25/2024: Moderate protein-calorie malnutrition

## 2024-02-01 NOTE — PROGRESS NOTE ADULT - PROBLEM SELECTOR PROBLEM 1
Alcohol dependence with withdrawal

## 2024-02-01 NOTE — BH CONSULTATION LIAISON PROGRESS NOTE - NSBHCONSULTRECOMMENDOTHER_PSY_A_CORE FT
1/29/24: discontinue phenobarb as it is NOT alcohol withdrawal anymore after 11 days   - Patient is confused and agitated from his system being overloaded with numerous agents given for a prolonged period of time   - NEEDS A WASH OUT  1/30/24: continue wash out, no PRNs  - can be discharged tomorrow if he continues this progression of improvement. Ask family to come pick him up - should not be sent home alone. 
1/29/24: discontinue phenobarb as it is NOT alcohol withdrawal anymore after 11 days   - Patient is confused and agitated from his system being overloaded with numerous agents given for a prolonged period of time   - NEEDS A WASH OUT  1/30/24: continue wash out, no PRNs  - can be discharged tomorrow if he continues this progression of improvement. Ask family to come pick him up - should not be sent home alone.   2/1/24: Patient's mental status with vast improvement and back to baseline.  - requesting to go home; no grounds to hold him against his will at this time   - CL psychiatry signing off

## 2024-02-01 NOTE — DISCHARGE NOTE NURSING/CASE MANAGEMENT/SOCIAL WORK - NSDPACMPNY_GEN_ALL_CORE
Family Alternatives Discussed Intro (Do Not Add Period): I discussed alternative treatments to Mohs surgery and specifically discussed the risks and benefits of

## 2024-02-01 NOTE — PROGRESS NOTE ADULT - PROBLEM SELECTOR PLAN 5
- Hb 11 with MCV 78  - can pursue outpt w/u  - no active s/s bleeding  - likely in setting of ETOH use d/o / poor nutrition  - keep active T&S

## 2024-02-01 NOTE — PROGRESS NOTE ADULT - PROBLEM SELECTOR PLAN 7
- QTc 544  - avoid QT prolonging agents  - supplement electrolytes
- QTc 544  - avoid QT prolonging agents  - repeat EKG in AM
- QTc 544  - avoid QT prolonging agents  - supplement electrolytes

## 2024-02-01 NOTE — DISCHARGE NOTE NURSING/CASE MANAGEMENT/SOCIAL WORK - NSDCPEFALRISK_GEN_ALL_CORE
For information on Fall & Injury Prevention, visit: [email or web address] OR  [email or web address] OR  https://www.cdc.gov/steadi/patient.html

## 2024-02-01 NOTE — BH CONSULTATION LIAISON PROGRESS NOTE - CURRENT MEDICATION
MEDICATIONS  (STANDING):  atorvastatin 20 milliGRAM(s) Oral at bedtime  heparin   Injectable 5000 Unit(s) SubCutaneous every 12 hours  pantoprazole    Tablet 40 milliGRAM(s) Oral before breakfast  polyethylene glycol 3350 17 Gram(s) Oral once  sucralfate 1 Gram(s) Oral four times a day    MEDICATIONS  (PRN):  acetaminophen     Tablet .. 650 milliGRAM(s) Oral every 6 hours PRN Temp greater or equal to 38C (100.4F), Mild Pain (1 - 3)  aluminum hydroxide/magnesium hydroxide/simethicone Suspension 30 milliLiter(s) Oral every 4 hours PRN Dyspepsia  magnesium hydroxide Suspension 30 milliLiter(s) Oral daily PRN Constipation  melatonin 3 milliGRAM(s) Oral at bedtime PRN Insomnia  OLANZapine Injectable 2.5 milliGRAM(s) IntraMuscular every 6 hours PRN Agitation  ondansetron Injectable 4 milliGRAM(s) IV Push every 8 hours PRN Nausea and/or Vomiting  
MEDICATIONS  (STANDING):  atorvastatin 20 milliGRAM(s) Oral at bedtime  heparin   Injectable 5000 Unit(s) SubCutaneous every 12 hours  pantoprazole    Tablet 40 milliGRAM(s) Oral before breakfast  sucralfate 1 Gram(s) Oral four times a day    MEDICATIONS  (PRN):

## 2024-02-01 NOTE — BH CONSULTATION LIAISON PROGRESS NOTE - NSBHATTESTBILLING_PSY_A_CORE
[id number] OBS or IP - low complexity OR 25-34 mins
No
[id number]-Bbepotohbq OBS or IP - low complexity OR 25-34 mins

## 2024-02-01 NOTE — PROGRESS NOTE ADULT - SUBJECTIVE AND OBJECTIVE BOX
58 year old male with PMHx of EtOH abuse and GERD secondary to gastritis/ PUD who presented to the ED on 1/18/24 for complaints of epigastric pain and hand tremors and admitted for alcohol dependence with high risk for withdrawal and ROSA. He is lying in bed in NAD.      MEDICATIONS  (STANDING):  atorvastatin 20 milliGRAM(s) Oral at bedtime  folic acid 1 milliGRAM(s) Oral daily  heparin   Injectable 5000 Unit(s) SubCutaneous every 12 hours  multivitamin 1 Tablet(s) Oral daily  pantoprazole    Tablet 40 milliGRAM(s) Oral before breakfast  PHENobarbital 32.4 milliGRAM(s) Oral every 12 hours  sucralfate 1 Gram(s) Oral four times a day    MEDICATIONS  (PRN):  acetaminophen     Tablet .. 650 milliGRAM(s) Oral every 6 hours PRN Temp greater or equal to 38C (100.4F), Mild Pain (1 - 3)  aluminum hydroxide/magnesium hydroxide/simethicone Suspension 30 milliLiter(s) Oral every 4 hours PRN Dyspepsia  melatonin 3 milliGRAM(s) Oral at bedtime PRN Insomnia  ondansetron Injectable 4 milliGRAM(s) IV Push every 8 hours PRN Nausea and/or Vomiting  PHENobarbital Injectable 130 milliGRAM(s) IntraMuscular every 2 hours PRN ciwa score>7      Allergies    No Known Allergies    Intolerances        Vital Signs Last 24 Hrs  T(C): 36.7 (23 Jan 2024 16:34), Max: 37 (23 Jan 2024 02:58)  T(F): 98 (23 Jan 2024 16:34), Max: 98.6 (23 Jan 2024 02:58)  HR: 65 (23 Jan 2024 16:34) (65 - 117)  BP: 110/72 (23 Jan 2024 16:34) (110/72 - 159/92)   RR: 18 (23 Jan 2024 16:34) (18 - 19)  SpO2: 99% (23 Jan 2024 16:34) (97% - 100%)    Parameters below as of 23 Jan 2024 02:58  Patient On (Oxygen Delivery Method): room air        PHYSICAL EXAM:  GENERAL: NAD   HEAD:  Atraumatic, Normocephalic  EYES: EOMI    NECK: Supple   NERVOUS SYSTEM:  Alert, tremulous & confused  CHEST/LUNG: Clear to auscultation bilaterally; No rales, rhonchi, wheezing, or rubs  HEART: Regular rate and rhythm; No murmurs, rubs, or gallops  ABDOMEN: Soft, Nontender, Nondistended; Bowel sounds present  EXTREMITIES:   No clubbing, cyanosis, or edema      LABS:    01-23    140  |  109<H>  |  9   ----------------------------<  68<L>  3.9   |  22  |  0.90    Ca    8.9      23 Jan 2024 06:33  Phos  2.9     01-23  Mg     1.7     01-23    TPro  7.6  /  Alb  3.3  /  TBili  0.6  /  DBili  x   /  AST  86<H>  /  ALT  101<H>  /  AlkPhos  73  01-23    PT/INR - ( 22 Jan 2024 08:34 )   PT: 11.5 sec;   INR: 0.97 ratio           Urinalysis Basic - ( 23 Jan 2024 06:33 )    Color: x / Appearance: x / SG: x / pH: x  Gluc: 68 mg/dL / Ketone: x  / Bili: x / Urobili: x   Blood: x / Protein: x / Nitrite: x   Leuk Esterase: x / RBC: x / WBC x   Sq Epi: x / Non Sq Epi: x / Bacteria: x       Culture - Urine (collected 18 Jan 2024 21:35)  Source: Clean Catch Clean Catch (Midstream)  Final Report (20 Jan 2024 04:46):    No growth      RADIOLOGY & ADDITIONAL TESTS:    01-22-24 @ 07:01 - 01-23-24 @ 07:00  --------------------------------------------------------  IN:  Total IN: 0 mL    OUT:    Oral Fluid: 0 mL    Voided (mL): 800 mL  Total OUT: 800 mL    Total NET: -800 mL      01-23-24 @ 07:01 - 01-23-24 @ 17:26  --------------------------------------------------------  IN:    Oral Fluid: 800 mL  Total IN: 800 mL    OUT:  Total OUT: 0 mL    Total NET: 800 mL      
58 year old male with PMHx of EtOH abuse and GERD secondary to gastritis/ PUD who presented to the ED on 1/18/24 for complaints of epigastric pain and hand tremors and admitted for alcohol dependence with high risk for withdrawal and ROSA. He is lying in bed in NAD.    MEDICATIONS  (STANDING):  atorvastatin 20 milliGRAM(s) Oral at bedtime  famotidine    Tablet 20 milliGRAM(s) Oral two times a day  folic acid 1 milliGRAM(s) Oral daily  multivitamin 1 Tablet(s) Oral daily  pantoprazole    Tablet 40 milliGRAM(s) Oral before breakfast  PHENobarbital Injectable 260 milliGRAM(s) IV Push every 3 hours  sucralfate 1 Gram(s) Oral four times a day  thiamine 100 milliGRAM(s) Oral daily    MEDICATIONS  (PRN):  acetaminophen     Tablet .. 650 milliGRAM(s) Oral every 6 hours PRN Temp greater or equal to 38C (100.4F), Mild Pain (1 - 3)  aluminum hydroxide/magnesium hydroxide/simethicone Suspension 30 milliLiter(s) Oral every 4 hours PRN Dyspepsia  melatonin 3 milliGRAM(s) Oral at bedtime PRN Insomnia  ondansetron Injectable 4 milliGRAM(s) IV Push every 8 hours PRN Nausea and/or Vomiting  traMADol 50 milliGRAM(s) Oral every 6 hours PRN Severe Pain (7 - 10)      Allergies    No Known Allergies    Intolerances        Vital Signs Last 24 Hrs  T(C): 37 (19 Jan 2024 10:47), Max: 37 (19 Jan 2024 10:47)  T(F): 98.6 (19 Jan 2024 10:47), Max: 98.6 (19 Jan 2024 10:47)  HR: 88 (19 Jan 2024 10:47) (64 - 147)  BP: 125/70 (19 Jan 2024 10:47) (125/70 - 152/92)  BP(mean): --  RR: 18 (19 Jan 2024 10:47) (13 - 20)  SpO2: 97% (19 Jan 2024 10:47) (97% - 99%)    Parameters below as of 19 Jan 2024 10:47  Patient On (Oxygen Delivery Method): room air        PHYSICAL EXAM:  GENERAL: NAD   HEAD:  Atraumatic, Normocephalic  EYES: EOMI    NECK: Supple   NERVOUS SYSTEM:  Alert, tremulous   CHEST/LUNG: Clear to auscultation bilaterally; No rales, rhonchi, wheezing, or rubs  HEART: Regular rate and rhythm; No murmurs, rubs, or gallops  ABDOMEN: Soft, Nontender, Nondistended; Bowel sounds present  EXTREMITIES:   No clubbing, cyanosis, or edema     LABS:                        11.0   3.90  )-----------( 162      ( 19 Jan 2024 09:00 )             36.0     01-19    141  |  109<H>  |  11  ----------------------------<  86  3.5   |  27  |  1.11    Ca    8.7      19 Jan 2024 09:00    TPro  7.4  /  Alb  3.2<L>  /  TBili  1.0  /  DBili  0.4<H>  /  AST  135<H>  /  ALT  140<H>  /  AlkPhos  61  01-19      Urinalysis Basic - ( 19 Jan 2024 09:00 )    Color: x / Appearance: x / SG: x / pH: x  Gluc: 86 mg/dL / Ketone: x  / Bili: x / Urobili: x   Blood: x / Protein: x / Nitrite: x   Leuk Esterase: x / RBC: x / WBC x   Sq Epi: x / Non Sq Epi: x / Bacteria: x      CAPILLARY BLOOD GLUCOSE      POCT Blood Glucose.: 132 mg/dL (18 Jan 2024 19:36)      RADIOLOGY & ADDITIONAL TESTS:    01-19-24 @ 07:01  -  01-19-24 @ 13:47  --------------------------------------------------------  IN:    Oral Fluid: 600 mL  Total IN: 600 mL    OUT:  Total OUT: 0 mL    Total NET: 600 mL      
58 year old male with PMHx of EtOH abuse and GERD secondary to gastritis/ PUD who presented to the ED on 1/18/24 for complaints of epigastric pain and hand tremors and admitted for alcohol dependence with high risk for withdrawal and ROSA. He is lying in bed in NAD.     MEDICATIONS  (STANDING):  atorvastatin 20 milliGRAM(s) Oral at bedtime  folic acid 1 milliGRAM(s) Oral daily  heparin   Injectable 5000 Unit(s) SubCutaneous every 12 hours  multivitamin 1 Tablet(s) Oral daily  pantoprazole    Tablet 40 milliGRAM(s) Oral before breakfast  PHENobarbital 64.8 milliGRAM(s) Oral every 12 hours  sucralfate 1 Gram(s) Oral four times a day    MEDICATIONS  (PRN):  acetaminophen     Tablet .. 650 milliGRAM(s) Oral every 6 hours PRN Temp greater or equal to 38C (100.4F), Mild Pain (1 - 3)  aluminum hydroxide/magnesium hydroxide/simethicone Suspension 30 milliLiter(s) Oral every 4 hours PRN Dyspepsia  melatonin 3 milliGRAM(s) Oral at bedtime PRN Insomnia  ondansetron Injectable 4 milliGRAM(s) IV Push every 8 hours PRN Nausea and/or Vomiting  PHENobarbital Injectable 130 milliGRAM(s) IV Push every 2 hours PRN CIWA>7      Allergies    No Known Allergies    Intolerances        Vital Signs Last 24 Hrs  T(C): 36.6 (22 Jan 2024 20:23), Max: 37.3 (21 Jan 2024 23:00)  T(F): 97.8 (22 Jan 2024 20:23), Max: 99.1 (21 Jan 2024 23:00)  HR: 77 (22 Jan 2024 20:23) (70 - 92)  BP: 120/81 (22 Jan 2024 20:23) (115/74 - 135/81)  BP(mean): --  RR: 18 (22 Jan 2024 20:23) (18 - 19)  SpO2: 97% (22 Jan 2024 20:23) (96% - 100%)    Parameters below as of 22 Jan 2024 20:23  Patient On (Oxygen Delivery Method): room air        PHYSICAL EXAM:  GENERAL: NAD   HEAD:  Atraumatic, Normocephalic  EYES: EOMI    NECK: Supple   NERVOUS SYSTEM:  Alert, tremulous & confused  CHEST/LUNG: Clear to auscultation bilaterally; No rales, rhonchi, wheezing, or rubs  HEART: Regular rate and rhythm; No murmurs, rubs, or gallops  ABDOMEN: Soft, Nontender, Nondistended; Bowel sounds present  EXTREMITIES:   No clubbing, cyanosis, or edema    LABS:                        11.0   7.16  )-----------( 122      ( 21 Jan 2024 09:24 )             34.7     01-22    138  |  109<H>  |  9   ----------------------------<  83  3.6   |  21<L>  |  0.83    Ca    8.6      22 Jan 2024 08:34  Phos  2.8     01-22  Mg     1.8     01-22    TPro  7.5  /  Alb  3.3  /  TBili  0.5  /  DBili  x   /  AST  126<H>  /  ALT  114<H>  /  AlkPhos  67  01-22    PT/INR - ( 22 Jan 2024 08:34 )   PT: 11.5 sec;   INR: 0.97 ratio           Urinalysis Basic - ( 22 Jan 2024 08:34 )    Color: x / Appearance: x / SG: x / pH: x  Gluc: 83 mg/dL / Ketone: x  / Bili: x / Urobili: x   Blood: x / Protein: x / Nitrite: x   Leuk Esterase: x / RBC: x / WBC x   Sq Epi: x / Non Sq Epi: x / Bacteria: x      CAPILLARY BLOOD GLUCOSE          Culture - Urine (collected 18 Jan 2024 21:35)  Source: Clean Catch Clean Catch (Midstream)  Final Report (20 Jan 2024 04:46):    No growth      RADIOLOGY & ADDITIONAL TESTS:    01-21-24 @ 07:01 - 01-22-24 @ 07:00  --------------------------------------------------------  IN:    Oral Fluid: 400 mL  Total IN: 400 mL    OUT:  Total OUT: 0 mL    Total NET: 400 mL      01-22-24 @ 07:01 - 01-22-24 @ 20:43  --------------------------------------------------------  IN:  Total IN: 0 mL    OUT:    Oral Fluid: 0 mL  Total OUT: 0 mL    Total NET: 0 mL      
Patient is a 58y old  Male who presents with a chief complaint of Alcohol dependence with high risk for withdrawal, ROSA (26 Jan 2024 08:29)    INTERVAL HPI/OVERNIGHT EVENTS: No acute events overnight. HD stable.     MEDICATIONS  (STANDING):  atorvastatin 20 milliGRAM(s) Oral at bedtime  folic acid 1 milliGRAM(s) Oral daily  heparin   Injectable 5000 Unit(s) SubCutaneous every 12 hours  magnesium oxide 400 milliGRAM(s) Oral three times a day with meals  multivitamin 1 Tablet(s) Oral daily  pantoprazole    Tablet 40 milliGRAM(s) Oral before breakfast  PHENobarbital 32.4 milliGRAM(s) Oral every 12 hours  sucralfate 1 Gram(s) Oral four times a day  thiamine IVPB 500 milliGRAM(s) IV Intermittent daily    MEDICATIONS  (PRN):  acetaminophen     Tablet .. 650 milliGRAM(s) Oral every 6 hours PRN Temp greater or equal to 38C (100.4F), Mild Pain (1 - 3)  aluminum hydroxide/magnesium hydroxide/simethicone Suspension 30 milliLiter(s) Oral every 4 hours PRN Dyspepsia  melatonin 3 milliGRAM(s) Oral at bedtime PRN Insomnia  OLANZapine Injectable 2.5 milliGRAM(s) IntraMuscular every 6 hours PRN Agitation  ondansetron Injectable 4 milliGRAM(s) IV Push every 8 hours PRN Nausea and/or Vomiting  PHENobarbital Injectable 130 milliGRAM(s) IV Push every 2 hours PRN ciwa score>7      Allergies    No Known Allergies    Intolerances        REVIEW OF SYSTEMS: all negative with exception of above    Vital Signs Last 24 Hrs  T(C): 36.7 (27 Jan 2024 11:00), Max: 37.7 (26 Jan 2024 23:32)  T(F): 98 (27 Jan 2024 11:00), Max: 99.8 (26 Jan 2024 23:32)  HR: 67 (27 Jan 2024 11:00) (67 - 100)  BP: 119/79 (27 Jan 2024 11:00) (112/77 - 157/89)  BP(mean): --  RR: 17 (27 Jan 2024 11:00) (17 - 18)  SpO2: 93% (27 Jan 2024 11:00) (92% - 95%)    Parameters below as of 26 Jan 2024 18:53  Patient On (Oxygen Delivery Method): room air    PHYSICAL EXAM:  GENERAL: NAD, well-groomed  NERVOUS SYSTEM:  Alert & Oriented X3, Good concentration; Motor Strength 5/5 B/L upper and lower extremities; DTRs 2+ intact and symmetric  CHEST/LUNG: Clear to percussion bilaterally; No rales, rhonchi, wheezing, or rubs  HEART: Regular rate and rhythm; No murmurs, rubs, or gallops  ABDOMEN: Soft, Nontender, Nondistended; Bowel sounds present  EXTREMITIES:  2+ Peripheral Pulses, No clubbing, cyanosis, or edema    LABS:                        12.4   4.57  )-----------( 292      ( 27 Jan 2024 06:10 )             41.1     01-27    139  |  111<H>  |  11  ----------------------------<  76  3.8   |  20<L>  |  0.85    Ca    9.4      27 Jan 2024 06:10  Phos  2.7     01-26  Mg     2.1     01-26        Urinalysis Basic - ( 27 Jan 2024 06:10 )    Color: x / Appearance: x / SG: x / pH: x  Gluc: 76 mg/dL / Ketone: x  / Bili: x / Urobili: x   Blood: x / Protein: x / Nitrite: x   Leuk Esterase: x / RBC: x / WBC x   Sq Epi: x / Non Sq Epi: x / Bacteria: x      CAPILLARY BLOOD GLUCOSE          RADIOLOGY & ADDITIONAL TESTS:    Imaging Personally Reviewed:  [ ] YES  [ ] NO    Consultant(s) Notes Reviewed:  [ ] YES  [ ] NO    Care Discussed with Consultants/Other Providers [ ] YES  [ ] NO
58 year old male with PMHx of EtOH abuse and GERD secondary to gastritis/ PUD who presented to the ED on 1/18/24 for complaints of epigastric pain and hand tremors and admitted for alcohol dependence with high risk for withdrawal and ROSA. He is lying in bed in NAD.       MEDICATIONS  (STANDING):  atorvastatin 20 milliGRAM(s) Oral at bedtime  folic acid 1 milliGRAM(s) Oral daily  heparin   Injectable 5000 Unit(s) SubCutaneous every 12 hours  multivitamin 1 Tablet(s) Oral daily  pantoprazole    Tablet 40 milliGRAM(s) Oral before breakfast  PHENobarbital 64.8 milliGRAM(s) Oral every 12 hours  potassium phosphate / sodium phosphate Powder (PHOS-NaK) 2 Packet(s) Oral four times a day  sucralfate 1 Gram(s) Oral four times a day    MEDICATIONS  (PRN):  acetaminophen     Tablet .. 650 milliGRAM(s) Oral every 6 hours PRN Temp greater or equal to 38C (100.4F), Mild Pain (1 - 3)  aluminum hydroxide/magnesium hydroxide/simethicone Suspension 30 milliLiter(s) Oral every 4 hours PRN Dyspepsia  melatonin 3 milliGRAM(s) Oral at bedtime PRN Insomnia  ondansetron Injectable 4 milliGRAM(s) IV Push every 8 hours PRN Nausea and/or Vomiting  PHENobarbital Injectable 130 milliGRAM(s) IV Push every 2 hours PRN CIWA>7      Allergies    No Known Allergies    Intolerances        Vital Signs Last 24 Hrs  T(C): 37.1 (21 Jan 2024 16:57), Max: 37.1 (21 Jan 2024 10:29)  T(F): 98.8 (21 Jan 2024 16:57), Max: 98.8 (21 Jan 2024 10:29)  HR: 83 (21 Jan 2024 16:57) (83 - 112)  BP: 124/84 (21 Jan 2024 16:57) (109/77 - 136/84)   RR: 18 (21 Jan 2024 16:57) (18 - 18)  SpO2: 98% (21 Jan 2024 16:57) (96% - 98%)    Parameters below as of 21 Jan 2024 16:57  Patient On (Oxygen Delivery Method): room air      PHYSICAL EXAM:  GENERAL: NAD   HEAD:  Atraumatic, Normocephalic  EYES: EOMI    NECK: Supple   NERVOUS SYSTEM:  Alert, tremulous & confused  CHEST/LUNG: Clear to auscultation bilaterally; No rales, rhonchi, wheezing, or rubs  HEART: Regular rate and rhythm; No murmurs, rubs, or gallops  ABDOMEN: Soft, Nontender, Nondistended; Bowel sounds present  EXTREMITIES:   No clubbing, cyanosis, or edema    LABS:                        11.0   7.16  )-----------( 122      ( 21 Jan 2024 09:24 )             34.7     01-21    137  |  107  |  8   ----------------------------<  84  3.8   |  20<L>  |  1.07    Ca    8.5      21 Jan 2024 09:24  Phos  1.9     01-21  Mg     1.8     01-21    TPro  7.5  /  Alb  3.3  /  TBili  0.6  /  DBili  x   /  AST  97<H>  /  ALT  106<H>  /  AlkPhos  71  01-21      Urinalysis Basic - ( 21 Jan 2024 09:24 )    Color: x / Appearance: x / SG: x / pH: x  Gluc: 84 mg/dL / Ketone: x  / Bili: x / Urobili: x   Blood: x / Protein: x / Nitrite: x   Leuk Esterase: x / RBC: x / WBC x   Sq Epi: x / Non Sq Epi: x / Bacteria: x      CAPILLARY BLOOD GLUCOSE          Culture - Urine (collected 18 Jan 2024 21:35)  Source: Clean Catch Clean Catch (Midstream)  Final Report (20 Jan 2024 04:46):    No growth      RADIOLOGY & ADDITIONAL TESTS:    01-20-24 @ 07:01  -  01-21-24 @ 07:00  --------------------------------------------------------  IN:    Oral Fluid: 400 mL  Total IN: 400 mL    OUT:    Voided (mL): 700 mL  Total OUT: 700 mL    Total NET: -300 mL      01-21-24 @ 07:01 - 01-21-24 @ 19:06  --------------------------------------------------------  IN:    Oral Fluid: 400 mL  Total IN: 400 mL    OUT:  Total OUT: 0 mL    Total NET: 400 mL      
58 year old male with PMHx of EtOH abuse and GERD secondary to gastritis/ PUD who presented to the ED on 1/18/24 for complaints of epigastric pain and hand tremors and admitted for alcohol dependence with high risk for withdrawal and ROSA. He is lying in bed in NAD.      MEDICATIONS  (STANDING):  atorvastatin 20 milliGRAM(s) Oral at bedtime  folic acid 1 milliGRAM(s) Oral daily  heparin   Injectable 5000 Unit(s) SubCutaneous every 12 hours  multivitamin 1 Tablet(s) Oral daily  pantoprazole    Tablet 40 milliGRAM(s) Oral before breakfast  potassium phosphate / sodium phosphate Powder (PHOS-NaK) 2 Packet(s) Oral three times a day  sucralfate 1 Gram(s) Oral four times a day  thiamine 100 milliGRAM(s) Oral daily    MEDICATIONS  (PRN):  acetaminophen     Tablet .. 650 milliGRAM(s) Oral every 6 hours PRN Temp greater or equal to 38C (100.4F), Mild Pain (1 - 3)  aluminum hydroxide/magnesium hydroxide/simethicone Suspension 30 milliLiter(s) Oral every 4 hours PRN Dyspepsia  melatonin 3 milliGRAM(s) Oral at bedtime PRN Insomnia  ondansetron Injectable 4 milliGRAM(s) IV Push every 8 hours PRN Nausea and/or Vomiting  PHENobarbital Injectable 130 milliGRAM(s) IV Push every 2 hours PRN CIWA>7  traMADol 50 milliGRAM(s) Oral every 6 hours PRN Severe Pain (7 - 10)      Allergies    No Known Allergies    Intolerances        Vital Signs Last 24 Hrs  T(C): 36.8 (20 Jan 2024 16:37), Max: 36.8 (19 Jan 2024 23:45)  T(F): 98.3 (20 Jan 2024 16:37), Max: 98.3 (19 Jan 2024 23:45)  HR: 77 (20 Jan 2024 16:37) (59 - 79)  BP: 175/66 (20 Jan 2024 16:37) (126/78 - 175/66)  BP(mean): --  RR: 18 (20 Jan 2024 16:37) (18 - 18)  SpO2: 99% (20 Jan 2024 16:37) (98% - 99%)    Parameters below as of 20 Jan 2024 16:37  Patient On (Oxygen Delivery Method): room air        PHYSICAL EXAM:  GENERAL: NAD   HEAD:  Atraumatic, Normocephalic  EYES: EOMI    NECK: Supple   NERVOUS SYSTEM:  Alert, tremulous & confused  CHEST/LUNG: Clear to auscultation bilaterally; No rales, rhonchi, wheezing, or rubs  HEART: Regular rate and rhythm; No murmurs, rubs, or gallops  ABDOMEN: Soft, Nontender, Nondistended; Bowel sounds present  EXTREMITIES:   No clubbing, cyanosis, or edema    LABS:                        10.8   2.87  )-----------( 159      ( 20 Jan 2024 07:10 )             35.5     01-20    137  |  105  |  9   ----------------------------<  83  3.6   |  23  |  0.86    Ca    8.4<L>      20 Jan 2024 07:10  Phos  2.2     01-20  Mg     1.5     01-20    TPro  6.9  /  Alb  3.1<L>  /  TBili  0.5  /  DBili  x   /  AST  105<H>  /  ALT  120<H>  /  AlkPhos  64  01-20      Urinalysis Basic - ( 20 Jan 2024 07:10 )    Color: x / Appearance: x / SG: x / pH: x  Gluc: 83 mg/dL / Ketone: x  / Bili: x / Urobili: x   Blood: x / Protein: x / Nitrite: x   Leuk Esterase: x / RBC: x / WBC x   Sq Epi: x / Non Sq Epi: x / Bacteria: x      CAPILLARY BLOOD GLUCOSE          Culture - Urine (collected 18 Jan 2024 21:35)  Source: Clean Catch Clean Catch (Midstream)  Final Report (20 Jan 2024 04:46):    No growth      RADIOLOGY & ADDITIONAL TESTS:    01-19-24 @ 07:01  -  01-20-24 @ 07:00  --------------------------------------------------------  IN:    Oral Fluid: 1072 mL  Total IN: 1072 mL    OUT:  Total OUT: 0 mL    Total NET: 1072 mL      01-20-24 @ 07:01  -  01-20-24 @ 19:37  --------------------------------------------------------  IN:    Oral Fluid: 400 mL  Total IN: 400 mL    OUT:    Voided (mL): 700 mL  Total OUT: 700 mL    Total NET: -300 mL      
Patient is a 58y old  Male who presents with a chief complaint of Alcohol dependence with high risk for withdrawal, ROSA (24 Jan 2024 13:51)    SUBJECTIVE / OVERNIGHT EVENTS:    No events overnight. This AM, KEVIN VILLA speaks without prompting, speech is nonsensical, he is not redirectable. Unable to obtain ROS.    MEDICATIONS  (STANDING):  atorvastatin 20 milliGRAM(s) Oral at bedtime  folic acid 1 milliGRAM(s) Oral daily  heparin   Injectable 5000 Unit(s) SubCutaneous every 12 hours  magnesium oxide 400 milliGRAM(s) Oral three times a day with meals  multivitamin 1 Tablet(s) Oral daily  pantoprazole    Tablet 40 milliGRAM(s) Oral before breakfast  PHENobarbital 32.4 milliGRAM(s) Oral every 12 hours  potassium chloride    Tablet ER 40 milliEquivalent(s) Oral every 4 hours  sucralfate 1 Gram(s) Oral four times a day    MEDICATIONS  (PRN):  acetaminophen     Tablet .. 650 milliGRAM(s) Oral every 6 hours PRN Temp greater or equal to 38C (100.4F), Mild Pain (1 - 3)  aluminum hydroxide/magnesium hydroxide/simethicone Suspension 30 milliLiter(s) Oral every 4 hours PRN Dyspepsia  melatonin 3 milliGRAM(s) Oral at bedtime PRN Insomnia  ondansetron Injectable 4 milliGRAM(s) IV Push every 8 hours PRN Nausea and/or Vomiting  PHENobarbital Injectable 130 milliGRAM(s) IntraMuscular every 2 hours PRN ciwa score>7      PHYSICAL EXAM:  T(C): 36.8 (01-25-24 @ 10:25), Max: 36.9 (01-24-24 @ 16:30)  HR: 124 (01-25-24 @ 10:25) (60 - 124)  BP: 113/78 (01-25-24 @ 10:25) (108/76 - 139/86)  RR: 18 (01-25-24 @ 10:25) (18 - 18)  SpO2: 87% (01-25-24 @ 10:25) (87% - 100%)  I&O's Summary    24 Jan 2024 07:01  -  25 Jan 2024 07:00  --------------------------------------------------------  IN: 640 mL / OUT: 800 mL / NET: -160 mL    25 Jan 2024 07:01  -  25 Jan 2024 11:06  --------------------------------------------------------  IN: 118 mL / OUT: 0 mL / NET: 118 mL      GENERAL: NAD, seated in chair, speaking nonsensical  HEAD:  Atraumatic, Normocephalic, MMM  CHEST/LUNG: No use of accessory muscles, CTAB, breathing non-labored  COR: RR, no mrcg  ABD: Soft, ND/NT, +BS  PSYCH: Alert  NEUROLOGY: CN II-XII grossly intact, moving all extremities  SKIN: No rashes or lesions  EXT: wwp, no cce    LABS:  CAPILLARY BLOOD GLUCOSE      POCT Blood Glucose.: 96 mg/dL (24 Jan 2024 17:42)                          12.3   4.91  )-----------( 241      ( 25 Jan 2024 07:47 )             39.2     01-25    140  |  107  |  9   ----------------------------<  78  3.1<L>   |  25  |  1.01    Ca    8.9      25 Jan 2024 07:47  Phos  1.9     01-25  Mg     1.6     01-25            Urinalysis Basic - ( 25 Jan 2024 07:47 )    Color: x / Appearance: x / SG: x / pH: x  Gluc: 78 mg/dL / Ketone: x  / Bili: x / Urobili: x   Blood: x / Protein: x / Nitrite: x   Leuk Esterase: x / RBC: x / WBC x   Sq Epi: x / Non Sq Epi: x / Bacteria: x          RADIOLOGY & ADDITIONAL TESTS:    Telemetry Personally Reviewed -     Imaging Personally Reviewed -     Imaging Reviewed -     Consultant(s) Notes Reviewed -       Care Discussed with Consultants/Other Providers - 
Patient is a 58y old  Male who presents with a chief complaint of Alcohol dependence with high risk for withdrawal, ROSA (31 Jan 2024 13:42)    INTERVAL HPI/OVERNIGHT EVENTS: No acute events overnight. HD stable.     MEDICATIONS  (STANDING):  atorvastatin 20 milliGRAM(s) Oral at bedtime  heparin   Injectable 5000 Unit(s) SubCutaneous every 12 hours  pantoprazole    Tablet 40 milliGRAM(s) Oral before breakfast  sucralfate 1 Gram(s) Oral four times a day    MEDICATIONS  (PRN):  acetaminophen     Tablet .. 650 milliGRAM(s) Oral every 6 hours PRN Temp greater or equal to 38C (100.4F), Mild Pain (1 - 3)  aluminum hydroxide/magnesium hydroxide/simethicone Suspension 30 milliLiter(s) Oral every 4 hours PRN Dyspepsia  magnesium hydroxide Suspension 30 milliLiter(s) Oral daily PRN Constipation  melatonin 3 milliGRAM(s) Oral at bedtime PRN Insomnia  OLANZapine Injectable 2.5 milliGRAM(s) IntraMuscular every 6 hours PRN Agitation  ondansetron Injectable 4 milliGRAM(s) IV Push every 8 hours PRN Nausea and/or Vomiting      Allergies    No Known Allergies    Intolerances        REVIEW OF SYSTEMS: all negative with exception of above    Vital Signs Last 24 Hrs  T(C): 37.2 (01 Feb 2024 12:32), Max: 37.4 (31 Jan 2024 23:09)  T(F): 98.9 (01 Feb 2024 12:32), Max: 99.3 (31 Jan 2024 23:09)  HR: 88 (01 Feb 2024 12:32) (67 - 88)  BP: 102/67 (01 Feb 2024 12:32) (102/67 - 143/79)  BP(mean): --  RR: 17 (01 Feb 2024 12:32) (16 - 18)  SpO2: 98% (01 Feb 2024 12:32) (97% - 98%)    Parameters below as of 31 Jan 2024 14:55  Patient On (Oxygen Delivery Method): room air    PHYSICAL EXAM:  GENERAL: NAD, well-groomed  NERVOUS SYSTEM:  Alert & Oriented X3, Good concentration; Motor Strength 5/5 B/L upper and lower extremities; DTRs 2+ intact and symmetric  CHEST/LUNG: Clear to percussion bilaterally; No rales, rhonchi, wheezing, or rubs  HEART: Regular rate and rhythm; No murmurs, rubs, or gallops  ABDOMEN: Soft, Nontender, Nondistended; Bowel sounds present  EXTREMITIES:  2+ Peripheral Pulses, No clubbing, cyanosis, or edema    LABS:              CAPILLARY BLOOD GLUCOSE          RADIOLOGY & ADDITIONAL TESTS:    Imaging Personally Reviewed:  [ ] YES  [ ] NO    Consultant(s) Notes Reviewed:  [ ] YES  [ ] NO    Care Discussed with Consultants/Other Providers [ ] YES  [ ] NO
Patient is a 58y old  Male who presents with a chief complaint of UNCOMPLICATED ALCOHOL WITHDRAWAL     (25 Jan 2024 14:04)    SUBJECTIVE / OVERNIGHT EVENTS:    No events overnight. This AM, KEVIN VILLA speaks without prompting, speech is nonsensical, he is not redirectable. Unable to obtain ROS.    MEDICATIONS  (STANDING):  atorvastatin 20 milliGRAM(s) Oral at bedtime  folic acid 1 milliGRAM(s) Oral daily  heparin   Injectable 5000 Unit(s) SubCutaneous every 12 hours  magnesium oxide 400 milliGRAM(s) Oral three times a day with meals  multivitamin 1 Tablet(s) Oral daily  pantoprazole    Tablet 40 milliGRAM(s) Oral before breakfast  PHENobarbital 32.4 milliGRAM(s) Oral every 12 hours  sucralfate 1 Gram(s) Oral four times a day    MEDICATIONS  (PRN):  acetaminophen     Tablet .. 650 milliGRAM(s) Oral every 6 hours PRN Temp greater or equal to 38C (100.4F), Mild Pain (1 - 3)  aluminum hydroxide/magnesium hydroxide/simethicone Suspension 30 milliLiter(s) Oral every 4 hours PRN Dyspepsia  melatonin 3 milliGRAM(s) Oral at bedtime PRN Insomnia  ondansetron Injectable 4 milliGRAM(s) IV Push every 8 hours PRN Nausea and/or Vomiting  PHENobarbital Injectable 130 milliGRAM(s) IntraMuscular every 2 hours PRN ciwa score>7      PHYSICAL EXAM:  T(C): 37.2 (01-26-24 @ 04:46), Max: 37.2 (01-26-24 @ 04:46)  HR: 80 (01-26-24 @ 04:46) (64 - 124)  BP: 118/79 (01-26-24 @ 04:46) (107/75 - 123/85)  RR: 18 (01-26-24 @ 04:46) (18 - 18)  SpO2: 95% (01-26-24 @ 04:46) (87% - 95%)  I&O's Summary    25 Jan 2024 07:01  -  26 Jan 2024 07:00  --------------------------------------------------------  IN: 118 mL / OUT: 0 mL / NET: 118 mL      GENERAL: NAD, lying in bed  HEAD:  Atraumatic, Normocephalic, MMM  CHEST/LUNG: No use of accessory muscles, CTAB, breathing non-labored  COR: RR, no mrcg  ABD: Soft, ND/NT, +BS  PSYCH: Alert, but not appropriately answering questions  NEUROLOGY: CN II-XII grossly intact, moving all extremities  SKIN: No rashes or lesions  EXT: wwp, no cce    LABS:  CAPILLARY BLOOD GLUCOSE                              12.0   4.60  )-----------( 197      ( 26 Jan 2024 06:10 )             38.6     01-25    140  |  107  |  9   ----------------------------<  78  3.1<L>   |  25  |  1.01    Ca    8.9      25 Jan 2024 07:47  Phos  1.9     01-25  Mg     1.6     01-25            Urinalysis Basic - ( 25 Jan 2024 07:47 )    Color: x / Appearance: x / SG: x / pH: x  Gluc: 78 mg/dL / Ketone: x  / Bili: x / Urobili: x   Blood: x / Protein: x / Nitrite: x   Leuk Esterase: x / RBC: x / WBC x   Sq Epi: x / Non Sq Epi: x / Bacteria: x          RADIOLOGY & ADDITIONAL TESTS:    Telemetry Personally Reviewed -     Imaging Personally Reviewed -     Imaging Reviewed -     Consultant(s) Notes Reviewed -       Care Discussed with Consultants/Other Providers - 
Patient is a 58y old  Male who presents with a chief complaint of Alcohol dependence with high risk for withdrawal, ROSA (30 Jan 2024 14:29)    INTERVAL HPI/OVERNIGHT EVENTS: No acute events overnight. HD stable.    MEDICATIONS  (STANDING):  atorvastatin 20 milliGRAM(s) Oral at bedtime  heparin   Injectable 5000 Unit(s) SubCutaneous every 12 hours  pantoprazole    Tablet 40 milliGRAM(s) Oral before breakfast  sucralfate 1 Gram(s) Oral four times a day    MEDICATIONS  (PRN):  acetaminophen     Tablet .. 650 milliGRAM(s) Oral every 6 hours PRN Temp greater or equal to 38C (100.4F), Mild Pain (1 - 3)  aluminum hydroxide/magnesium hydroxide/simethicone Suspension 30 milliLiter(s) Oral every 4 hours PRN Dyspepsia  magnesium hydroxide Suspension 30 milliLiter(s) Oral daily PRN Constipation  melatonin 3 milliGRAM(s) Oral at bedtime PRN Insomnia  OLANZapine Injectable 2.5 milliGRAM(s) IntraMuscular every 6 hours PRN Agitation  ondansetron Injectable 4 milliGRAM(s) IV Push every 8 hours PRN Nausea and/or Vomiting      Allergies    No Known Allergies    Intolerances        REVIEW OF SYSTEMS: all negative with exception of above    Vital Signs Last 24 Hrs  T(C): 36.9 (31 Jan 2024 11:02), Max: 36.9 (31 Jan 2024 00:24)  T(F): 98.4 (31 Jan 2024 11:02), Max: 98.5 (31 Jan 2024 00:24)  HR: 75 (31 Jan 2024 11:02) (60 - 98)  BP: 120/80 (31 Jan 2024 11:02) (107/70 - 120/83)  BP(mean): --  RR: 17 (31 Jan 2024 11:02) (16 - 17)  SpO2: 94% (31 Jan 2024 11:02) (94% - 97%)    Parameters below as of 31 Jan 2024 11:02  Patient On (Oxygen Delivery Method): room air        PHYSICAL EXAM:  GENERAL: NAD, well-groomed  NERVOUS SYSTEM:  Alert & Oriented X3, Good concentration; Motor Strength 5/5 B/L upper and lower extremities; DTRs 2+ intact and symmetric  CHEST/LUNG: Clear to percussion bilaterally; No rales, rhonchi, wheezing, or rubs  HEART: Regular rate and rhythm; No murmurs, rubs, or gallops  ABDOMEN: Soft, Nontender, Nondistended; Bowel sounds present  EXTREMITIES:  2+ Peripheral Pulses, No clubbing, cyanosis, or edema    LABS:              CAPILLARY BLOOD GLUCOSE          RADIOLOGY & ADDITIONAL TESTS:    Imaging Personally Reviewed:  [ ] YES  [ ] NO    Consultant(s) Notes Reviewed:  [ ] YES  [ ] NO    Care Discussed with Consultants/Other Providers [ ] YES  [ ] NO
Patient is a 58y old  Male who presents with a chief complaint of Alcohol dependence with high risk for withdrawal, ROSA (29 Jan 2024 14:12)    INTERVAL HPI/OVERNIGHT EVENTS: No acute events overnight. HD stable.     MEDICATIONS  (STANDING):  atorvastatin 20 milliGRAM(s) Oral at bedtime  heparin   Injectable 5000 Unit(s) SubCutaneous every 12 hours  pantoprazole    Tablet 40 milliGRAM(s) Oral before breakfast  sucralfate 1 Gram(s) Oral four times a day    MEDICATIONS  (PRN):  acetaminophen     Tablet .. 650 milliGRAM(s) Oral every 6 hours PRN Temp greater or equal to 38C (100.4F), Mild Pain (1 - 3)  aluminum hydroxide/magnesium hydroxide/simethicone Suspension 30 milliLiter(s) Oral every 4 hours PRN Dyspepsia  melatonin 3 milliGRAM(s) Oral at bedtime PRN Insomnia  OLANZapine Injectable 2.5 milliGRAM(s) IntraMuscular every 6 hours PRN Agitation  ondansetron Injectable 4 milliGRAM(s) IV Push every 8 hours PRN Nausea and/or Vomiting      Allergies    No Known Allergies    Intolerances        REVIEW OF SYSTEMS: all negative with exception of above    Vital Signs Last 24 Hrs  T(C): 36.6 (30 Jan 2024 10:33), Max: 37.1 (30 Jan 2024 05:45)  T(F): 97.9 (30 Jan 2024 10:33), Max: 98.8 (30 Jan 2024 05:45)  HR: 77 (30 Jan 2024 10:33) (77 - 100)  BP: 108/73 (30 Jan 2024 10:33) (108/73 - 122/85)  BP(mean): --  RR: 18 (30 Jan 2024 10:33) (17 - 18)  SpO2: 91% (30 Jan 2024 10:33) (91% - 96%)    Parameters below as of 30 Jan 2024 10:33  Patient On (Oxygen Delivery Method): room air        PHYSICAL EXAM:  GENERAL: NAD, well-groomed  NERVOUS SYSTEM:  Alert & Oriented X3, Good concentration; Motor Strength 5/5 B/L upper and lower extremities; DTRs 2+ intact and symmetric  CHEST/LUNG: Clear to percussion bilaterally; No rales, rhonchi, wheezing, or rubs  HEART: Regular rate and rhythm; No murmurs, rubs, or gallops  ABDOMEN: Soft, Nontender, Nondistended; Bowel sounds present  EXTREMITIES:  2+ Peripheral Pulses, No clubbing, cyanosis, or edema    LABS:              CAPILLARY BLOOD GLUCOSE          RADIOLOGY & ADDITIONAL TESTS:    Imaging Personally Reviewed:  [ ] YES  [ ] NO    Consultant(s) Notes Reviewed:  [ ] YES  [ ] NO    Care Discussed with Consultants/Other Providers [ ] YES  [ ] NO
Patient is a 58y old  Male who presents with a chief complaint of Alcohol dependence with high risk for withdrawal, ROSA (28 Jan 2024 13:50)    INTERVAL HPI/OVERNIGHT EVENTS: No acute events overnight. HD stable.     MEDICATIONS  (STANDING):  atorvastatin 20 milliGRAM(s) Oral at bedtime  heparin   Injectable 5000 Unit(s) SubCutaneous every 12 hours  pantoprazole    Tablet 40 milliGRAM(s) Oral before breakfast  sucralfate 1 Gram(s) Oral four times a day    MEDICATIONS  (PRN):  acetaminophen     Tablet .. 650 milliGRAM(s) Oral every 6 hours PRN Temp greater or equal to 38C (100.4F), Mild Pain (1 - 3)  aluminum hydroxide/magnesium hydroxide/simethicone Suspension 30 milliLiter(s) Oral every 4 hours PRN Dyspepsia  melatonin 3 milliGRAM(s) Oral at bedtime PRN Insomnia  OLANZapine Injectable 2.5 milliGRAM(s) IntraMuscular every 6 hours PRN Agitation  ondansetron Injectable 4 milliGRAM(s) IV Push every 8 hours PRN Nausea and/or Vomiting      Allergies    No Known Allergies    Intolerances        REVIEW OF SYSTEMS: all negative with exception of above    Vital Signs Last 24 Hrs  T(C): 36.5 (29 Jan 2024 11:04), Max: 37 (28 Jan 2024 16:57)  T(F): 97.7 (29 Jan 2024 11:04), Max: 98.6 (28 Jan 2024 16:57)  HR: 101 (29 Jan 2024 11:04) (73 - 106)  BP: 103/68 (29 Jan 2024 11:04) (103/68 - 131/80)  BP(mean): --  RR: 18 (29 Jan 2024 11:04) (17 - 20)  SpO2: 95% (29 Jan 2024 11:04) (94% - 98%)    Parameters below as of 28 Jan 2024 16:57  Patient On (Oxygen Delivery Method): room air    PHYSICAL EXAM:  GENERAL: NAD, well-groomed  NERVOUS SYSTEM:  Alert & Oriented X3, Good concentration; Motor Strength 5/5 B/L upper and lower extremities; DTRs 2+ intact and symmetric  CHEST/LUNG: Clear to percussion bilaterally; No rales, rhonchi, wheezing, or rubs  HEART: Regular rate and rhythm; No murmurs, rubs, or gallops  ABDOMEN: Soft, Nontender, Nondistended; Bowel sounds present  EXTREMITIES:  2+ Peripheral Pulses, No clubbing, cyanosis, or edema      LABS:      Mg     1.9     01-28          CAPILLARY BLOOD GLUCOSE          RADIOLOGY & ADDITIONAL TESTS:    Imaging Personally Reviewed:  [ ] YES  [ ] NO    Consultant(s) Notes Reviewed:  [ ] YES  [ ] NO    Care Discussed with Consultants/Other Providers [ ] YES  [ ] NO
[facility] of [facility] Medicine  Bettina Callejas DO  Available on MS Teams  Pager: [phone]    Patient is a 58y old  Male who presents with a chief complaint of Alcohol dependence with high risk for withdrawal, ROSA (23 Jan 2024 17:26)    Subjective:  Pt seen and examined at bedside in no acute distress. Speaking unprompted, nonsensical, not redirectable. Unable to participate with exam.     VITAL SIGNS:  T(C): 37.1 (01-24-24 @ 09:57), Max: 37.1 (01-24-24 @ 09:57)  T(F): 98.8 (01-24-24 @ 09:57), Max: 98.8 (01-24-24 @ 09:57)  HR: 84 (01-24-24 @ 09:57) (65 - 84)  BP: 106/66 (01-24-24 @ 09:57) (106/66 - 137/87)  BP(mean): --  RR: 18 (01-24-24 @ 09:57) (17 - 18)  SpO2: 98% (01-24-24 @ 09:57) (96% - 99%)  Wt(kg): --    PHYSICAL EXAM:  Constitutional: agitated  Head: NC/AT  Eyes: PERRL, EOMI, anicteric sclera  ENT: no nasal discharge; MMM  Neck: supple; no JVD  Respiratory: CTA B/L; no W/R/R; comfortable on RA  Cardiac: +S1/S2; RRR; no M/R/G  Gastrointestinal: soft, overly nourished; NT/ND; no rebound or guarding; +BSx4  Extremities: WWP, no clubbing or cyanosis; no peripheral edema  Musculoskeletal: moves all extremities spontaneously  Vascular: 2+ radial, DP/PT pulses B/L  Dermatologic: skin warm, dry and intact; no rashes, wounds, or scars  Neurologic: AOx1 (self), no focal deficits  Psychiatric: agitated, delirious not redirectable     MEDICATIONS  (STANDING):  atorvastatin 20 milliGRAM(s) Oral at bedtime  folic acid 1 milliGRAM(s) Oral daily  heparin   Injectable 5000 Unit(s) SubCutaneous every 12 hours  multivitamin 1 Tablet(s) Oral daily  pantoprazole    Tablet 40 milliGRAM(s) Oral before breakfast  PHENobarbital 32.4 milliGRAM(s) Oral every 12 hours  sucralfate 1 Gram(s) Oral four times a day    MEDICATIONS  (PRN):  acetaminophen     Tablet .. 650 milliGRAM(s) Oral every 6 hours PRN Temp greater or equal to 38C (100.4F), Mild Pain (1 - 3)  aluminum hydroxide/magnesium hydroxide/simethicone Suspension 30 milliLiter(s) Oral every 4 hours PRN Dyspepsia  melatonin 3 milliGRAM(s) Oral at bedtime PRN Insomnia  ondansetron Injectable 4 milliGRAM(s) IV Push every 8 hours PRN Nausea and/or Vomiting  PHENobarbital Injectable 130 milliGRAM(s) IntraMuscular every 2 hours PRN ciwa score>7    LABS:    01-23    140  |  109<H>  |  9   ----------------------------<  68<L>  3.9   |  22  |  0.90    Ca    8.9      23 Jan 2024 06:33  Phos  2.9     01-23  Mg     1.7     01-23    TPro  7.6  /  Alb  3.3  /  TBili  0.6  /  DBili  x   /  AST  86<H>  /  ALT  101<H>  /  AlkPhos  73  01-23      Urinalysis Basic - ( 23 Jan 2024 06:33 )    Color: x / Appearance: x / SG: x / pH: x  Gluc: 68 mg/dL / Ketone: x  / Bili: x / Urobili: x   Blood: x / Protein: x / Nitrite: x   Leuk Esterase: x / RBC: x / WBC x   Sq Epi: x / Non Sq Epi: x / Bacteria: x      CAPILLARY BLOOD GLUCOSE      POCT Blood Glucose.: 102 mg/dL (24 Jan 2024 10:47)      RADIOLOGY & ADDITIONAL TESTS: Reviewed.    
Patient is a 58y old  Male who presents with a chief complaint of Alcohol dependence with high risk for withdrawal, ROSA (27 Jan 2024 13:23)    INTERVAL HPI/OVERNIGHT EVENTS: No acute events overnight. HD stable.     MEDICATIONS  (STANDING):  atorvastatin 20 milliGRAM(s) Oral at bedtime  folic acid 1 milliGRAM(s) Oral daily  heparin   Injectable 5000 Unit(s) SubCutaneous every 12 hours  magnesium oxide 400 milliGRAM(s) Oral three times a day with meals  multivitamin 1 Tablet(s) Oral daily  pantoprazole    Tablet 40 milliGRAM(s) Oral before breakfast  PHENobarbital 32.4 milliGRAM(s) Oral every 12 hours  sucralfate 1 Gram(s) Oral four times a day  thiamine IVPB 500 milliGRAM(s) IV Intermittent daily    MEDICATIONS  (PRN):  acetaminophen     Tablet .. 650 milliGRAM(s) Oral every 6 hours PRN Temp greater or equal to 38C (100.4F), Mild Pain (1 - 3)  aluminum hydroxide/magnesium hydroxide/simethicone Suspension 30 milliLiter(s) Oral every 4 hours PRN Dyspepsia  melatonin 3 milliGRAM(s) Oral at bedtime PRN Insomnia  OLANZapine Injectable 2.5 milliGRAM(s) IntraMuscular every 6 hours PRN Agitation  ondansetron Injectable 4 milliGRAM(s) IV Push every 8 hours PRN Nausea and/or Vomiting  PHENobarbital Injectable 130 milliGRAM(s) IV Push every 2 hours PRN ciwa score>7      Allergies    No Known Allergies    Intolerances        REVIEW OF SYSTEMS: all negative with exception of above    Vital Signs Last 24 Hrs  T(C): 36.4 (28 Jan 2024 11:57), Max: 37.1 (27 Jan 2024 23:13)  T(F): 97.6 (28 Jan 2024 11:57), Max: 98.8 (27 Jan 2024 23:13)  HR: 83 (28 Jan 2024 11:57) (83 - 110)  BP: 128/85 (28 Jan 2024 11:57) (113/79 - 149/95)  BP(mean): --  RR: 18 (28 Jan 2024 11:57) (18 - 18)  SpO2: 96% (28 Jan 2024 11:57) (93% - 96%)    Parameters below as of 28 Jan 2024 06:20  Patient On (Oxygen Delivery Method): room air        PHYSICAL EXAM:  GENERAL: NAD, well-groomed  NERVOUS SYSTEM:  Alert & Oriented X3, Good concentration; Motor Strength 5/5 B/L upper and lower extremities; DTRs 2+ intact and symmetric  CHEST/LUNG: Clear to percussion bilaterally; No rales, rhonchi, wheezing, or rubs  HEART: Regular rate and rhythm; No murmurs, rubs, or gallops  ABDOMEN: Soft, Nontender, Nondistended; Bowel sounds present  EXTREMITIES:  2+ Peripheral Pulses, No clubbing, cyanosis, or edema    LABS:                        12.4   4.57  )-----------( 292      ( 27 Jan 2024 06:10 )             41.1     01-27    139  |  111<H>  |  11  ----------------------------<  76  3.8   |  20<L>  |  0.85    Ca    9.4      27 Jan 2024 06:10  Mg     1.9     01-28        Urinalysis Basic - ( 27 Jan 2024 06:10 )    Color: x / Appearance: x / SG: x / pH: x  Gluc: 76 mg/dL / Ketone: x  / Bili: x / Urobili: x   Blood: x / Protein: x / Nitrite: x   Leuk Esterase: x / RBC: x / WBC x   Sq Epi: x / Non Sq Epi: x / Bacteria: x      CAPILLARY BLOOD GLUCOSE          RADIOLOGY & ADDITIONAL TESTS:    Imaging Personally Reviewed:  [ ] YES  [ ] NO    Consultant(s) Notes Reviewed:  [ ] YES  [ ] NO    Care Discussed with Consultants/Other Providers [ ] YES  [ ] NO

## 2024-02-01 NOTE — PROGRESS NOTE ADULT - ASSESSMENT
58 year old male with PMHx of EtOH abuse and GERD secondary to gastritis/ PUD who presented to the ED on 1/18/24 for complaints of epigastric pain and hand tremors and admitted for alcohol dependence with high risk for withdrawal and ROSA.    EtOH dependence now in withdrawal  - patient drinks 1 glass of whiskey on most days x 35 years, last drink was 3 days ago  - Lipase slightly elevated, likely due to gastritis rather than pancreatitis  - Seizure and aspiration precautions  - c/w thiamine, folic acid and multivitamins   - CIWA protocol w/ PRN librium   - taper off standing librium    - Counseled on the importance of alcohol cessation     ROSA  - resolved  - likely prerenal secondary to GI losses     Transaminitis  - likely due to EtOH abuse  - Avoid hepatotoxic agents  - Trend LFTs     Abd pain w/ history of GERD/ gastritis/PUD  - c/w pantoprazole & sucralfate   - will get CT abd if pain does not improve   - ultram PRN pain    Chronic Microcytic anemia  - Hgb appears around baseline  - no signs of bleeding  - likely due to chronic ETOH use    HLD  - c/w Lipitor - will hold if LFTs do not improve    Prophylaxis:  DVT: heparin   GI: Protonix    
Pt is a 57 yo M with PMH ETOH use d/o (daily whiskey), GERD, gastritis, and HLD p/w ETOH w/d on CIWA and phenobarbital taper. 
58 year old male with PMHx of EtOH abuse and GERD secondary to gastritis/ PUD who presented to the ED on 1/18/24 for complaints of epigastric pain and hand tremors and admitted for alcohol dependence with high risk for withdrawal and ROSA.    EtOH dependence now in withdrawal  - patient drinks 1 glass of whiskey on most days x 35 years, last drink was 3 days ago  - Lipase slightly elevated, likely due to gastritis rather than pancreatitis  - Seizure and aspiration precautions  - c/w thiamine, folic acid and multivitamins   - CIWA protocol, will swicth librium taper to phenobarb given the fact that pt's withdrawal was not well controlled w/ Benzos on prior admission   - Counseled on the importance of alcohol cessation    Hypomagnesemia and Hypophosphatasemia  - replace w/ Mg and Phos    ROSA  - resolved  - likely prerenal secondary to GI losses     Transaminitis  - likely due to EtOH abuse  - Avoid hepatotoxic agents  - Trend LFTs     Abd pain w/ history of GERD/ gastritis/PUD  - c/w pantoprazole & sucralfate   - will get CT abd if pain does not improve   - ultram PRN pain    Chronic Microcytic anemia  - Hgb appears around baseline  - no signs of bleeding  - likely due to chronic ETOH use    HLD  - c/w Lipitor - will hold if LFTs do not improve    Prophylaxis:  DVT: heparin   GI: Protonix    
58 year old male with PMHx of EtOH abuse and GERD secondary to gastritis/ PUD who presented to the ED on 1/18/24 for complaints of epigastric pain and hand tremors and admitted for alcohol dependence with high risk for withdrawal and ROSA.    EtOH dependence now in withdrawal  - patient drinks 1 glass of whiskey on most days x 35 years, last drink was 3 days ago  - Lipase slightly elevated, likely due to gastritis rather than pancreatitis  - Seizure and aspiration precautions  - c/w thiamine, folic acid and multivitamins   - [facility] protocol, will switch librium taper to phenobarb given the fact that pt's withdrawal was not well controlled w/ Benzos on prior admission   - Counseled on the importance of alcohol cessation    Hypophosphatasemia  - replace w/ PO Phos    ROSA  - resolved  - likely prerenal secondary to GI losses     Transaminitis  - likely due to EtOH abuse  - Avoid hepatotoxic agents  - Trend LFTs     Abd pain w/ history of GERD/ gastritis/PUD  - c/w pantoprazole & sucralfate   - will get CT abd if pain does not improve   - ultram PRN pain    Chronic Microcytic anemia  - Hgb appears around baseline  - no signs of bleeding  - likely due to chronic ETOH use    HLD  - c/w Lipitor - will hold if LFTs do not improve    Prophylaxis:  DVT: heparin   GI: Protonix    
Pt is a 59 yo M with PMH ETOH use d/o (daily whiskey), GERD, gastritis, and HLD p/w ETOH w/d on CIWA and phenobarbital taper. 
58 year old male with PMHx of EtOH abuse and GERD secondary to gastritis/ PUD who presented to the ED on 1/18/24 for complaints of epigastric pain and hand tremors and admitted for alcohol dependence with high risk for withdrawal and ORSA.    EtOH dependence now in withdrawal  - patient drinks 1 glass of whiskey on most days x 35 years, last drink was 3 days ago  - Lipase slightly elevated, likely due to gastritis rather than pancreatitis  - Seizure and aspiration precautions  - c/w thiamine, folic acid and multivitamins   - [facility] protocol, will swicth librium taper to phenobarb given the fact that pt's withdrawal was not well controlled w/ Benzos on prior admission   - Counseled on the importance of alcohol cessation    ROSA  - resolved  - likely prerenal secondary to GI losses     Transaminitis  - likely due to EtOH abuse  - Avoid hepatotoxic agents  - Trend LFTs     Abd pain w/ history of GERD/ gastritis/PUD  - c/w pantoprazole & sucralfate   - will get CT abd if pain does not improve   - ultram PRN pain    Chronic Microcytic anemia  - Hgb appears around baseline  - no signs of bleeding  - likely due to chronic ETOH use    HLD  - c/w Lipitor - will hold if LFTs do not improve    Prophylaxis:  DVT: heparin   GI: Protonix    
58 year old male with PMHx of EtOH abuse and GERD secondary to gastritis/ PUD who presented to the ED on 1/18/24 for complaints of epigastric pain and hand tremors and admitted for alcohol dependence with high risk for withdrawal and ROSA.    EtOH dependence now in withdrawal  - patient drinks 1 glass of whiskey on most days x 35 years, last drink was 3 days ago  - Lipase slightly elevated, likely due to gastritis rather than pancreatitis  - Seizure and aspiration precautions  - c/w thiamine, folic acid and multivitamins   - CIWA protocol w/ PRN librium   - taper off standing librium    - Counseled on the importance of alcohol cessation     ROSA  - resolved  - likely prerenal secondary to GI losses     Transaminitis  - likely due to EtOH abuse  - Avoid hepatotoxic agents  - Trend LFTs     Abd pain w/ history of GERD/ gastritis/PUD  - c/w pantoprazole & sucralfate   - will get CT abd if pain does not improve   - ultram PRN pain    Chronic Microcytic anemia  - Hgb appears around baseline  - no signs of bleeding  - likely due to chronic ETOH use    HLD  - c/w Lipitor - will hold if LFTs do not improve    Prophylaxis:  DVT: heparin   GI: Protonix    
Pt is a 57 yo M with PMH ETOH use d/o (daily whiskey), GERD, gastritis, and HLD p/w ETOH w/d on CIWA and phenobarbital taper. 
Pt is a 59 yo M with PMH ETOH use d/o (daily whiskey), GERD, gastritis, and HLD p/w ETOH w/d on CIWA and phenobarbital taper. 
Pt is a 59 yo M with PMH ETOH use d/o (daily whiskey), GERD, gastritis, and HLD p/w ETOH w/d on CIWA and phenobarbital taper. 
Pt is a 57 yo M with PMH ETOH use d/o (daily whiskey), GERD, gastritis, and HLD p/w ETOH w/d on CIWA and phenobarbital taper.

## 2024-02-01 NOTE — DISCHARGE NOTE PROVIDER - ATTENDING DISCHARGE PHYSICAL EXAMINATION:
Vital Signs Last 24 Hrs  T(F): 98.1 (01 Feb 2024 17:43), Max: 99.3 (31 Jan 2024 23:09)  HR: 81 (01 Feb 2024 17:43) (68 - 88)  BP: 132/84 (01 Feb 2024 17:43) (102/67 - 132/84)  RR: 16 (01 Feb 2024 17:43) (16 - 18)  SpO2: 98% (01 Feb 2024 17:43) (98% - 98%)    Physical Exam:  Constitutional: NAD, awake and alert, well-developed  Neck: Soft and supple, No LAD, No JVD  Respiratory: cta b/l no wheezing no rhonchi  Cardiovascular: +s1/s2 no edema b/l le  Gastrointestinal: soft nt nd bs+  Vascular: 2+ peripheral pulses  Neurological: A/O x 3, no focal deficits

## 2024-02-01 NOTE — BH CONSULTATION LIAISON PROGRESS NOTE - NSBHINDICATION_PSY_ALL_CORE
Patient Education        Hand Pain in Children: Care Instructions  Your Care Instructions     Common causes of hand pain are overuse and injuries, such as might happen during sports. Everyday wear and tear also can cause hand pain. Most minor hand injuries will heal on their own, and home treatment is usually all you need to do. If your child has sudden and severe pain, he or she may need tests and treatment. Follow-up care is a key part of your child's treatment and safety. Be sure to make and go to all appointments, and call your doctor if your child is having problems. It's also a good idea to know your child's test results and keep a list of the medicines your child takes. How can you care for your child at home? · Give pain medicines exactly as directed. ? If the doctor gave your child a prescription medicine for pain, give it as prescribed. ? If your child is not taking a prescription pain medicine, ask your doctor if your child can take an over-the-counter medicine. · Have your child rest and protect the hand. Have your child take a break from any activity that may cause pain. · Put ice or a cold pack on your child's hand for 10 to 20 minutes at a time. Put a thin cloth between the ice and your child's skin. · Prop up the sore hand on a pillow when you ice it or anytime your child sits or lies down during the next 3 days. Try to keep it above the level of your child's heart. This will help reduce swelling. · If your doctor recommends a sling, splint, or elastic bandage to support the hand, have your child wear it as directed. When should you call for help?    Call your doctor now or seek immediate medical care if:    · Your child's hand turns cool or pale or changes color.     · Your child cannot move his or her hand.     · Your child's hand pops, moves out of its normal position, and then returns to its normal position.     · Your child has signs of infection, such as:  ? Increased pain,
swelling, warmth, or redness. ? Red streaks leading from the sore area. ? Pus draining from a place on the hand. ? A fever.     · Your child's hand feels numb or tingly. Watch closely for changes in your child's health, and be sure to contact your doctor if:    · Your child's hand feels unstable when he or she tries to use it.     · Your child has any new symptoms, such as swelling.     · Bruises from an injury to your child's hand last longer than 2 weeks. Where can you learn more? Go to [email or web address] and sign in to your [employer] account. Enter Q600 in the [employer] box to learn more about \"Hand Pain in Children: Care Instructions. \"     If you do not have an account, please click on the \"Sign Up Now\" link. Current as of: July 1, 2021               Content Version: 13.0  © 2006-2021 [employer], [facility]. Care instructions adapted under license by [facility] ([facility]). If you have questions about a medical condition or this instruction, always ask your healthcare professional. [place] [address] any warranty or liability for your use of this information.
fall risk 
fall risk

## 2024-02-01 NOTE — PROGRESS NOTE ADULT - PROBLEM SELECTOR PROBLEM 5
Microcytic anemia

## 2024-02-02 NOTE — PROGRESS NOTE ADULT - ASSESSMENT
----- Message from Kaushik Agarwal DO sent at 2/2/2024  3:36 AM CST -----  Please contact Mrs. Gallegos about her recent test results.  Her lab results showed low but not deficient Vit D and were otherwise stable. Recommend following an AHA-Med diet, exercise, current Rxs, with OTC Vit D3 supplementation at 2,000IU daily. Follow up fasting labs in 12mo. Thank you.      Patient is a 56M with a PMH of chronic ETOH abuse with hx of alcohol withdrawal and DTs with frequent hospital admissions, alcoholic gastritis/esophagitis, PUD, HLD, hx of hypoxic respiratory failure requiring intubation due to aspiration PNA who presents to the ED for abdominal pain and vomiting.  Patient reports severe abdominal pain for the last three days with vomiting.  Noted to have brown vomitus in ED with concern for variceal bleeding.  Patient states that his last drink was three days ago.  Tachycardic in ED to 127.  Labs show significant lactic acidosis.  Blood alcohol level of 296.  Patient reportedly withdrawing in the ED and was started on benzodiazepines.  CIWA currently 0.  Will admit patient to tele (recent prolonged hospital stay with ICU visit due to uncontrolled DTs).     Alcohol withdrawal syndrome without complication.    -in ICU currently   -continue Precedex gtt, phenobarbital prn, ativan prn  Please taper slowly as patient has significant history of DTs  Thiamine, Folate, MVI daily.     Chronic alcoholic gastritis without hemorrhage.  Plan: toradol prn, ppi.     Lactic acidosis.  Plan: Will hydrate, monitor lactate.   :   R/O Hematemesis without nausea.  Plan: H/H stable, hematemesis unlikely  will continue to monitor.     Swallowing difficulties-NPO currently  Official speech and swallow eval pending    DVT ppx -Lovenox    Spoke with son and made his aware that father is in ICU currently.

## 2024-02-06 NOTE — PATIENT PROFILE ADULT - NSPROEDAABILITYLEARN_GEN_A_NUR
Lvm for the patient to rtc regarding Short Term Disability paperwork from [employer]. He is scheduled for a follow up on 02/13/2024 and it can be completed at that time. Paperwork is due no later than 02/22/2024.   none

## 2024-02-06 NOTE — ED PROVIDER NOTE - CPE EDP PSYCH NORM
Department of Anesthesiology  Preprocedure Note       Name:  Barney Velasquez   Age:  78 y.o.  :  1945                                          MRN:  [id number]         Date:  2024      Surgeon: Surgeon(s):  Godfrey Cruz MD    Procedure: Procedure(s):  EGD  ESOPHAGEAL DILATATION    Medications prior to admission:   Prior to Admission medications    Medication Sig Start Date End Date Taking? Authorizing Provider   atorvastatin (LIPITOR) 20 MG tablet Take 1 tablet by mouth daily 24   Shadi Veronica MD   amLODIPine (NORVASC) 5 MG tablet TAKE 1 TABLET BY MOUTH ONE TIME DAILY 23   Shadi Veronica MD   Multiple Vitamin (MULTIVITAMIN PO) Take by mouth    Automatic Reconciliation, Ar       Current medications:    Current Facility-Administered Medications   Medication Dose Route Frequency Provider Last Rate Last Admin   • 0.9 % sodium chloride infusion   IntraVENous Continuous Godfrey Cruz MD   Stopped at 24 1141   • sodium chloride flush 0.9 % injection 5-40 mL  5-40 mL IntraVENous 2 times per day Godfrey Cruz MD       • sodium chloride flush 0.9 % injection 5-40 mL  5-40 mL IntraVENous PRN Godfrey Cruz MD       • 0.9 % sodium chloride infusion  25 mL IntraVENous PRN Godfrey Cruz MD         Current Outpatient Medications   Medication Sig Dispense Refill   • atorvastatin (LIPITOR) 20 MG tablet Take 1 tablet by mouth daily 90 tablet 3   • amLODIPine (NORVASC) 5 MG tablet TAKE 1 TABLET BY MOUTH ONE TIME DAILY 90 tablet 3   • Multiple Vitamin (MULTIVITAMIN PO) Take by mouth         Allergies:    Allergies   Allergen Reactions   • Bee Venom Swelling       Problem List:    Patient Active Problem List   Diagnosis Code   • Prostate cancer (HCC) C61   • Diverticulosis of large intestine without hemorrhage K57.30   • PVC (premature ventricular contraction) I49.3   • Essential hypertension I10   • Dyslipidemia E78.5   • Azotemia R79.89   • Chronic renal disease, stage III (HCC) [955673] 
normal...

## 2024-02-08 NOTE — BH CONSULTATION LIAISON PROGRESS NOTE - NSBHATTESTTYPEVISIT_PSY_A_CORE
[de-identified] : The patient presents with MCL tear of the left knee.  At this time, I recommend she continue with collateral hinged bracing and be reassessed in four weeks.  She was instructed on home therapeutic modalities.  
Attending Only

## 2024-02-13 NOTE — ED ADULT NURSE NOTE - ED STAT RN HANDOFF TIME
FAMILY HISTORY:  Father  Still living? Unknown  FH: lung cancer, Age at diagnosis: Age Unknown    Mother  Still living? Unknown  FH: diabetes mellitus, Age at diagnosis: Age Unknown    
05:51

## 2024-02-13 NOTE — ED PROVIDER NOTE - NS ED MD DISPO DISCHARGE CCDA
Patient/Caregiver provided printed discharge information. decreased appetite in setting of acute illness

## 2024-02-19 ENCOUNTER — INPATIENT (INPATIENT)
Facility: HOSPITAL | Age: 59
LOS: 0 days | Discharge: HOME | End: 2024-02-20
Attending: STUDENT IN AN ORGANIZED HEALTH CARE EDUCATION/TRAINING PROGRAM | Admitting: STUDENT IN AN ORGANIZED HEALTH CARE EDUCATION/TRAINING PROGRAM
Payer: MEDICAID

## 2024-02-19 VITALS
TEMPERATURE: 99 F | OXYGEN SATURATION: 99 % | SYSTOLIC BLOOD PRESSURE: 147 MMHG | HEART RATE: 135 BPM | HEIGHT: 66 IN | WEIGHT: 160.06 LBS | RESPIRATION RATE: 19 BRPM | DIASTOLIC BLOOD PRESSURE: 89 MMHG

## 2024-02-19 DIAGNOSIS — Z29.9 ENCOUNTER FOR PROPHYLACTIC MEASURES, UNSPECIFIED: ICD-10-CM

## 2024-02-19 DIAGNOSIS — R63.8 OTHER SYMPTOMS AND SIGNS CONCERNING FOOD AND FLUID INTAKE: ICD-10-CM

## 2024-02-19 DIAGNOSIS — R79.89 OTHER SPECIFIED ABNORMAL FINDINGS OF BLOOD CHEMISTRY: ICD-10-CM

## 2024-02-19 DIAGNOSIS — R10.9 UNSPECIFIED ABDOMINAL PAIN: ICD-10-CM

## 2024-02-19 DIAGNOSIS — F10.239 ALCOHOL DEPENDENCE WITH WITHDRAWAL, UNSPECIFIED: ICD-10-CM

## 2024-02-19 DIAGNOSIS — R74.8 ABNORMAL LEVELS OF OTHER SERUM ENZYMES: ICD-10-CM

## 2024-02-19 DIAGNOSIS — K21.9 GASTRO-ESOPHAGEAL REFLUX DISEASE WITHOUT ESOPHAGITIS: ICD-10-CM

## 2024-02-19 LAB
ALBUMIN SERPL ELPH-MCNC: 3.6 G/DL — SIGNIFICANT CHANGE UP (ref 3.3–5)
ALP SERPL-CCNC: 61 U/L — SIGNIFICANT CHANGE UP (ref 40–120)
ALT FLD-CCNC: 38 U/L — SIGNIFICANT CHANGE UP (ref 12–78)
ANION GAP SERPL CALC-SCNC: 14 MMOL/L — SIGNIFICANT CHANGE UP (ref 5–17)
AST SERPL-CCNC: 46 U/L — HIGH (ref 15–37)
BASOPHILS # BLD AUTO: 0.06 K/UL — SIGNIFICANT CHANGE UP (ref 0–0.2)
BASOPHILS NFR BLD AUTO: 1.5 % — SIGNIFICANT CHANGE UP (ref 0–2)
BILIRUB SERPL-MCNC: 0.4 MG/DL — SIGNIFICANT CHANGE UP (ref 0.2–1.2)
BUN SERPL-MCNC: 13 MG/DL — SIGNIFICANT CHANGE UP (ref 7–23)
CALCIUM SERPL-MCNC: 9.1 MG/DL — SIGNIFICANT CHANGE UP (ref 8.5–10.1)
CHLORIDE SERPL-SCNC: 106 MMOL/L — SIGNIFICANT CHANGE UP (ref 96–108)
CO2 SERPL-SCNC: 24 MMOL/L — SIGNIFICANT CHANGE UP (ref 22–31)
CREAT SERPL-MCNC: 1.23 MG/DL — SIGNIFICANT CHANGE UP (ref 0.5–1.3)
EGFR: 69 ML/MIN/1.73M2 — SIGNIFICANT CHANGE UP
ELLIPTOCYTES BLD QL SMEAR: SLIGHT — SIGNIFICANT CHANGE UP
EOSINOPHIL # BLD AUTO: 0.06 K/UL — SIGNIFICANT CHANGE UP (ref 0–0.5)
EOSINOPHIL NFR BLD AUTO: 1.5 % — SIGNIFICANT CHANGE UP (ref 0–6)
ETHANOL SERPL-MCNC: <10 MG/DL — SIGNIFICANT CHANGE UP (ref 0–10)
GLUCOSE SERPL-MCNC: 106 MG/DL — HIGH (ref 70–99)
HCT VFR BLD CALC: 40.9 % — SIGNIFICANT CHANGE UP (ref 39–50)
HGB BLD-MCNC: 13.1 G/DL — SIGNIFICANT CHANGE UP (ref 13–17)
IMM GRANULOCYTES NFR BLD AUTO: 0.2 % — SIGNIFICANT CHANGE UP (ref 0–0.9)
LACTATE SERPL-SCNC: 1.4 MMOL/L — SIGNIFICANT CHANGE UP (ref 0.7–2)
LACTATE SERPL-SCNC: 5.3 MMOL/L — CRITICAL HIGH (ref 0.7–2)
LACTATE SERPL-SCNC: 9 MMOL/L — CRITICAL HIGH (ref 0.7–2)
LIDOCAIN IGE QN: 77 U/L — HIGH (ref 13–75)
LYMPHOCYTES # BLD AUTO: 2.28 K/UL — SIGNIFICANT CHANGE UP (ref 1–3.3)
LYMPHOCYTES # BLD AUTO: 56.3 % — HIGH (ref 13–44)
MANUAL SMEAR VERIFICATION: YES — SIGNIFICANT CHANGE UP
MCHC RBC-ENTMCNC: 23.8 PG — LOW (ref 27–34)
MCHC RBC-ENTMCNC: 32 G/DL — SIGNIFICANT CHANGE UP (ref 32–36)
MCV RBC AUTO: 74.2 FL — LOW (ref 80–100)
MONOCYTES # BLD AUTO: 0.35 K/UL — SIGNIFICANT CHANGE UP (ref 0–0.9)
MONOCYTES NFR BLD AUTO: 8.6 % — SIGNIFICANT CHANGE UP (ref 2–14)
NEUTROPHILS # BLD AUTO: 1.29 K/UL — LOW (ref 1.8–7.4)
NEUTROPHILS NFR BLD AUTO: 31.9 % — LOW (ref 43–77)
NRBC # BLD: 0 /100 WBCS — SIGNIFICANT CHANGE UP (ref 0–0)
OVALOCYTES BLD QL SMEAR: SLIGHT — SIGNIFICANT CHANGE UP
PLAT MORPH BLD: NORMAL — SIGNIFICANT CHANGE UP
PLATELET # BLD AUTO: 291 K/UL — SIGNIFICANT CHANGE UP (ref 150–400)
POIKILOCYTOSIS BLD QL AUTO: SLIGHT — SIGNIFICANT CHANGE UP
POTASSIUM SERPL-MCNC: 4.2 MMOL/L — SIGNIFICANT CHANGE UP (ref 3.5–5.3)
POTASSIUM SERPL-SCNC: 4.2 MMOL/L — SIGNIFICANT CHANGE UP (ref 3.5–5.3)
PROT SERPL-MCNC: 8.9 GM/DL — HIGH (ref 6–8.3)
RBC # BLD: 5.51 M/UL — SIGNIFICANT CHANGE UP (ref 4.2–5.8)
RBC # FLD: 20.1 % — HIGH (ref 10.3–14.5)
RBC BLD AUTO: SIGNIFICANT CHANGE UP
SODIUM SERPL-SCNC: 144 MMOL/L — SIGNIFICANT CHANGE UP (ref 135–145)
TARGETS BLD QL SMEAR: SLIGHT — SIGNIFICANT CHANGE UP
WBC # BLD: 4.05 K/UL — SIGNIFICANT CHANGE UP (ref 3.8–10.5)
WBC # FLD AUTO: 4.05 K/UL — SIGNIFICANT CHANGE UP (ref 3.8–10.5)

## 2024-02-19 PROCEDURE — 99222 1ST HOSP IP/OBS MODERATE 55: CPT

## 2024-02-19 PROCEDURE — 74177 CT ABD & PELVIS W/CONTRAST: CPT | Mod: 26

## 2024-02-19 PROCEDURE — 99285 EMERGENCY DEPT VISIT HI MDM: CPT | Mod: 25

## 2024-02-19 PROCEDURE — 93010 ELECTROCARDIOGRAM REPORT: CPT

## 2024-02-19 PROCEDURE — 71045 X-RAY EXAM CHEST 1 VIEW: CPT | Mod: 26

## 2024-02-19 RX ORDER — SUCRALFATE 1 G
1 TABLET ORAL
Refills: 0 | Status: DISCONTINUED | OUTPATIENT
Start: 2024-02-19 | End: 2024-02-20

## 2024-02-19 RX ORDER — PHENOBARBITAL 60 MG
130 TABLET ORAL
Refills: 0 | Status: DISCONTINUED | OUTPATIENT
Start: 2024-02-19 | End: 2024-02-19

## 2024-02-19 RX ORDER — MORPHINE SULFATE 50 MG/1
2 CAPSULE, EXTENDED RELEASE ORAL EVERY 4 HOURS
Refills: 0 | Status: DISCONTINUED | OUTPATIENT
Start: 2024-02-19 | End: 2024-02-20

## 2024-02-19 RX ORDER — PHENOBARBITAL 60 MG
129.6 TABLET ORAL EVERY 4 HOURS
Refills: 0 | Status: DISCONTINUED | OUTPATIENT
Start: 2024-02-19 | End: 2024-02-19

## 2024-02-19 RX ORDER — FAMOTIDINE 10 MG/ML
20 INJECTION INTRAVENOUS ONCE
Refills: 0 | Status: COMPLETED | OUTPATIENT
Start: 2024-02-19 | End: 2024-02-19

## 2024-02-19 RX ORDER — PHENOBARBITAL 60 MG
260 TABLET ORAL
Refills: 0 | Status: DISCONTINUED | OUTPATIENT
Start: 2024-02-19 | End: 2024-02-19

## 2024-02-19 RX ORDER — LANOLIN ALCOHOL/MO/W.PET/CERES
3 CREAM (GRAM) TOPICAL AT BEDTIME
Refills: 0 | Status: DISCONTINUED | OUTPATIENT
Start: 2024-02-19 | End: 2024-02-20

## 2024-02-19 RX ORDER — PHENOBARBITAL 60 MG
390 TABLET ORAL ONCE
Refills: 0 | Status: DISCONTINUED | OUTPATIENT
Start: 2024-02-19 | End: 2024-02-19

## 2024-02-19 RX ORDER — PHENOBARBITAL 60 MG
259.2 TABLET ORAL
Refills: 0 | Status: DISCONTINUED | OUTPATIENT
Start: 2024-02-19 | End: 2024-02-20

## 2024-02-19 RX ORDER — LANOLIN ALCOHOL/MO/W.PET/CERES
5 CREAM (GRAM) TOPICAL AT BEDTIME
Refills: 0 | Status: DISCONTINUED | OUTPATIENT
Start: 2024-02-19 | End: 2024-02-19

## 2024-02-19 RX ORDER — SODIUM CHLORIDE 9 MG/ML
1000 INJECTION INTRAMUSCULAR; INTRAVENOUS; SUBCUTANEOUS ONCE
Refills: 0 | Status: COMPLETED | OUTPATIENT
Start: 2024-02-19 | End: 2024-02-19

## 2024-02-19 RX ORDER — PANTOPRAZOLE SODIUM 20 MG/1
40 TABLET, DELAYED RELEASE ORAL
Refills: 0 | Status: DISCONTINUED | OUTPATIENT
Start: 2024-02-19 | End: 2024-02-20

## 2024-02-19 RX ORDER — ENOXAPARIN SODIUM 100 MG/ML
40 INJECTION SUBCUTANEOUS EVERY 24 HOURS
Refills: 0 | Status: DISCONTINUED | OUTPATIENT
Start: 2024-02-19 | End: 2024-02-20

## 2024-02-19 RX ORDER — PHENOBARBITAL 60 MG
260 TABLET ORAL ONCE
Refills: 0 | Status: DISCONTINUED | OUTPATIENT
Start: 2024-02-19 | End: 2024-02-19

## 2024-02-19 RX ORDER — ACETAMINOPHEN 500 MG
650 TABLET ORAL EVERY 6 HOURS
Refills: 0 | Status: DISCONTINUED | OUTPATIENT
Start: 2024-02-19 | End: 2024-02-20

## 2024-02-19 RX ORDER — FOLIC ACID 0.8 MG
1 TABLET ORAL DAILY
Refills: 0 | Status: DISCONTINUED | OUTPATIENT
Start: 2024-02-19 | End: 2024-02-20

## 2024-02-19 RX ORDER — KETOROLAC TROMETHAMINE 30 MG/ML
15 SYRINGE (ML) INJECTION ONCE
Refills: 0 | Status: DISCONTINUED | OUTPATIENT
Start: 2024-02-19 | End: 2024-02-19

## 2024-02-19 RX ORDER — ATORVASTATIN CALCIUM 80 MG/1
20 TABLET, FILM COATED ORAL AT BEDTIME
Refills: 0 | Status: DISCONTINUED | OUTPATIENT
Start: 2024-02-19 | End: 2024-02-20

## 2024-02-19 RX ORDER — THIAMINE MONONITRATE (VIT B1) 100 MG
100 TABLET ORAL DAILY
Refills: 0 | Status: DISCONTINUED | OUTPATIENT
Start: 2024-02-19 | End: 2024-02-20

## 2024-02-19 RX ORDER — ONDANSETRON 8 MG/1
4 TABLET, FILM COATED ORAL ONCE
Refills: 0 | Status: COMPLETED | OUTPATIENT
Start: 2024-02-19 | End: 2024-02-19

## 2024-02-19 RX ORDER — ONDANSETRON 8 MG/1
4 TABLET, FILM COATED ORAL EVERY 8 HOURS
Refills: 0 | Status: DISCONTINUED | OUTPATIENT
Start: 2024-02-19 | End: 2024-02-20

## 2024-02-19 RX ORDER — SODIUM CHLORIDE 9 MG/ML
1000 INJECTION INTRAMUSCULAR; INTRAVENOUS; SUBCUTANEOUS
Refills: 0 | Status: DISCONTINUED | OUTPATIENT
Start: 2024-02-19 | End: 2024-02-20

## 2024-02-19 RX ORDER — ACETAMINOPHEN 500 MG
1000 TABLET ORAL ONCE
Refills: 0 | Status: COMPLETED | OUTPATIENT
Start: 2024-02-19 | End: 2024-02-19

## 2024-02-19 RX ADMIN — Medication 1 MILLIGRAM(S): at 13:08

## 2024-02-19 RX ADMIN — Medication 259.2 MILLIGRAM(S): at 22:32

## 2024-02-19 RX ADMIN — Medication 1000 MILLIGRAM(S): at 09:00

## 2024-02-19 RX ADMIN — ONDANSETRON 4 MILLIGRAM(S): 8 TABLET, FILM COATED ORAL at 03:43

## 2024-02-19 RX ADMIN — Medication 400 MILLIGRAM(S): at 08:31

## 2024-02-19 RX ADMIN — MORPHINE SULFATE 2 MILLIGRAM(S): 50 CAPSULE, EXTENDED RELEASE ORAL at 11:29

## 2024-02-19 RX ADMIN — Medication 50 MILLIGRAM(S): at 11:41

## 2024-02-19 RX ADMIN — Medication 650 MILLIGRAM(S): at 21:31

## 2024-02-19 RX ADMIN — Medication 130 MILLIGRAM(S): at 19:02

## 2024-02-19 RX ADMIN — Medication 100 MILLIGRAM(S): at 14:59

## 2024-02-19 RX ADMIN — MORPHINE SULFATE 2 MILLIGRAM(S): 50 CAPSULE, EXTENDED RELEASE ORAL at 09:49

## 2024-02-19 RX ADMIN — Medication 130 MILLIGRAM(S): at 18:25

## 2024-02-19 RX ADMIN — Medication 15 MILLIGRAM(S): at 04:39

## 2024-02-19 RX ADMIN — Medication 130 MILLIGRAM(S): at 18:44

## 2024-02-19 RX ADMIN — SODIUM CHLORIDE 1000 MILLILITER(S): 9 INJECTION INTRAMUSCULAR; INTRAVENOUS; SUBCUTANEOUS at 06:06

## 2024-02-19 RX ADMIN — Medication 2 MILLIGRAM(S): at 03:43

## 2024-02-19 RX ADMIN — Medication 1 TABLET(S): at 13:08

## 2024-02-19 RX ADMIN — PANTOPRAZOLE SODIUM 40 MILLIGRAM(S): 20 TABLET, DELAYED RELEASE ORAL at 09:57

## 2024-02-19 RX ADMIN — ENOXAPARIN SODIUM 40 MILLIGRAM(S): 100 INJECTION SUBCUTANEOUS at 13:08

## 2024-02-19 RX ADMIN — FAMOTIDINE 20 MILLIGRAM(S): 10 INJECTION INTRAVENOUS at 04:22

## 2024-02-19 RX ADMIN — ATORVASTATIN CALCIUM 20 MILLIGRAM(S): 80 TABLET, FILM COATED ORAL at 22:37

## 2024-02-19 RX ADMIN — Medication 30 MILLILITER(S): at 08:56

## 2024-02-19 RX ADMIN — Medication 15 MILLIGRAM(S): at 04:22

## 2024-02-19 RX ADMIN — SODIUM CHLORIDE 1000 MILLILITER(S): 9 INJECTION INTRAMUSCULAR; INTRAVENOUS; SUBCUTANEOUS at 03:47

## 2024-02-19 RX ADMIN — Medication 2 MILLIGRAM(S): at 09:42

## 2024-02-19 RX ADMIN — Medication 650 MILLIGRAM(S): at 20:31

## 2024-02-19 RX ADMIN — SODIUM CHLORIDE 125 MILLILITER(S): 9 INJECTION INTRAMUSCULAR; INTRAVENOUS; SUBCUTANEOUS at 09:57

## 2024-02-19 RX ADMIN — SODIUM CHLORIDE 1000 MILLILITER(S): 9 INJECTION INTRAMUSCULAR; INTRAVENOUS; SUBCUTANEOUS at 04:04

## 2024-02-19 NOTE — H&P ADULT - NSHPPHYSICALEXAM_GEN_ALL_CORE
PHYSICAL EXAM:  GENERAL: Mild distress  HEAD:  Atraumatic, Normocephalic  EYES: EOMI, PERRLA, conjunctiva and sclera clear  ENMT: No tonsillar erythema, exudates, or enlargement; dry mucous membranes,   NECK: Supple, No JVD,   CHEST/LUNG: Clear to ausculation bilaterally; No rales, rhonchi, wheezing, or rubs  HEART: Regular rate and rhythm; No murmurs, rubs, or gallops  ABDOMEN: Soft, +tender, Nondistended; Bowel sounds present  EXTREMITIES:  2+ Peripheral Pulses, No clubbing, cyanosis, or edema  LYMPH: No lymphadenopathy noted  SKIN: No rashes or lesions  NERVOUS SYSTEM:  Alert & Oriented X2 to person/ place PHYSICAL EXAM:  GENERAL: Mild distress, shaking/ tremors.  HEAD:  Atraumatic, Normocephalic  EYES: EOMI, PERRLA, conjunctiva and sclera clear  ENMT: No tonsillar erythema, exudates, or enlargement; dry mucous membranes,   NECK: Supple, No JVD,   CHEST/LUNG: Clear to ausculation bilaterally; No rales, rhonchi, wheezing, or rubs  HEART: tachycardic, Regular rhythm; No murmurs, rubs, or gallops  ABDOMEN: Soft, +diffuse tender, Nondistended; Bowel sounds present  EXTREMITIES:  2+ Peripheral Pulses, No clubbing, cyanosis, or edema  LYMPH: No lymphadenopathy noted  SKIN: No rashes or lesions  NERVOUS SYSTEM:  Alert & Oriented X2 to person/ place. moving ext b/l spontaneously. +Tremors.

## 2024-02-19 NOTE — H&P ADULT - HISTORY OF PRESENT ILLNESS
59 yo M with PMH ETOH use d/o (daily whiskey), GERD, gastritis, and HLD p/w ETOH w/d on CIWA and phenobarbital taper.  58 year old man with PMH ETOH use d/o (daily whiskey/vodka), GERD, gastritis, and HLD p/w diffuse pain. Patient in severe pain and shaking states he has been unable to keep anything down for the past 6 days besides water. He mentions last drink was 2 days ago. He also states he has diffuse pain, unable to state exact location that has been occurring for the past 2 weeks. Pain level 8/10. He also mentions that he was getting therapy and his therapist recommended him go to ED given his symptoms. Patient received some fluids, pain medication, and ativan in the ED and lost IV access.

## 2024-02-19 NOTE — ED ADULT NURSE NOTE - ED STAT RN HANDOFF DETAILS
pt report given to PRETTY Huizar on ED HOLD 1C. pt stable and in no acute distress at this time. vitals stable on CM, fluids finished. repeat lactate 5.2. IV Hep lock in place. safety maintained.

## 2024-02-19 NOTE — ED PROVIDER NOTE - OBJECTIVE STATEMENT
58 year old male with PMH of alcohol abuse, PUD, Pancreatitis hx otherwise presenting to ED due to persistent nausea/vomiting and inability to tolerate PO intake.

## 2024-02-19 NOTE — H&P ADULT - NSHPLABSRESULTS_GEN_ALL_CORE
13.1   4.05  )-----------( 291      ( 19 Feb 2024 03:23 )             40.9   02-19    144  |  106  |  13  ----------------------------<  106<H>  4.2   |  24  |  1.23    Ca    9.1      19 Feb 2024 03:23    TPro  8.9<H>  /  Alb  3.6  /  TBili  0.4  /  DBili  x   /  AST  46<H>  /  ALT  38  /  AlkPhos  61  02-19    Lactate 9-->5.3

## 2024-02-19 NOTE — CHART NOTE - NSCHARTNOTEFT_GEN_A_CORE
Hospitalist Medicine PA Note.    Patient requering Peripheral IV access for medical management. Under US guidance identified Left UE vein, prox  to AC and successfully placed 18g x 1.88 inch Angiocath into vessel. Placement confirmed s/p with ultrasound and catheter determined to be in patent lumen of vein. Pt tolerated well w/o complication.    Scott Lombardo PA-C

## 2024-02-19 NOTE — ED PROVIDER NOTE - NSICDXPASTMEDICALHX_GEN_ALL_CORE_FT
PAST MEDICAL HISTORY:  Elevated lipase     EtOH dependence     Gastritis     PUD (peptic ulcer disease)

## 2024-02-19 NOTE — H&P ADULT - PROBLEM SELECTOR PLAN 3
-In the setting of cirrhosis and dehydration  -Improved after IVF, but lost IV access  -Re administer IVF and reassess lactic acid level -In the setting of suspected dehydration due to N/V  -Improved after IVF, but lost IV access  -Re administer /hr and reassess lactic acid level

## 2024-02-19 NOTE — H&P ADULT - NSHPSOURCEINFOTX_GEN_ALL_CORE
Not able to obtain complete hx, as pt in severe pain/ EtOh intoxication Not able to obtain complete hx, as pt in  EtOh intoxication

## 2024-02-19 NOTE — CHART NOTE - NSCHARTNOTEFT_GEN_A_CORE
This is a 58 year old man with PMH ETOH use d/o (daily whiskey/vodka), GERD, gastritis, and HLD, recurrent admissions for ETOH withdrawal for DTs, admitted with generalized abdominal pain, nausea and tremors 2/2 withdrawal. Pt seen and examined at bedside, in acute distress with evidence of worsening ETOH withdrawal (Tremors, diaphoresis, tachycardia and subjective anxiety/withdrawal sx). Pt was initially started on Librium taper upon admission with PRN Ativan for elevated CIWA, but is still with concern for DT progression. Pt well known to hospital with high BZD tolerance and often requires high dosage of medication, most recently requiring high dose Phenobarbital load and taper. On Admission, pt with elevated lactate, pending CT AP read, with symptomatic improvement following initial Ativan and Anti-Emetic therapy.     ICU Vital Signs Last 24 Hrs  T(C): 36.4 (19 Feb 2024 08:59), Max: 37 (19 Feb 2024 02:25)  T(F): 97.6 (19 Feb 2024 08:59), Max: 98.6 (19 Feb 2024 02:25)  HR: 114 (19 Feb 2024 08:59) (98 - 135)  BP: 147/88 (19 Feb 2024 08:59) (129/82 - 147/89)  BP(mean): 95 (19 Feb 2024 07:07) (95 - 95)    RR: 20 (19 Feb 2024 08:59) (18 - 20)  SpO2: 99% (19 Feb 2024 08:59) (96% - 99%)    Physical Exam  General: Anxious appearing, in distress   Head: Atraumatic, normocephalic   Eyes: PERRL  ENMT: Moist mucous membranes, nares patent  Chest/Lungs:  non labored breathing, good air entry, No respiratory distress  Heart: tachycardic  Abd: Soft, Tender to palpation, nondistended, BS present.   Extremities: Normal pulses,  No edema, normal capillary refill   Skin: warm, Diaphoretic  Neuro:A&O x4, no focal neuro deficits, no willie delirium, diffuse tremors     A/P:     Acute Alcohol withdrawal in known patient with hx and risk factors for severe DT    - According to the MAR, pt had not received IV Ativan in past 18 hours (despite high CIWA and severe withdrawal)   - Load with Phenobarbital 15mg/kg (roughly 1000mg) 40% now, then 30% in 3 hours and remaining 30% in 6 hours (As per [facility] Protocol)   - PRN Phenobarb 130mg PRN for agitation   - Supportive care for abdominal pain/Gastritis w/ Carafate, PPI and PRN antiemetics

## 2024-02-19 NOTE — ED ADULT TRIAGE NOTE - CADM TRG TX PRIOR TO ARRIVAL
Telephone Encounter by Carmelina Baig at 11/13/17 12:15 PM     Author:  Carmelina aBig Service:  (none) Author Type:  (none)     Filed:  11/13/17 12:17 PM Encounter Date:  11/13/2017 Status:  Signed     :  Carmelina Baig            Patient was seen 11/5 and tested pos for mono.  Needs a note for school.  Also needs advice.[CT1.1M]       Revision History        User Key Date/Time User Provider Type Action    > CT1.1 11/13/17 12:17 PM Carmelina Baig (none) Sign    M - Manual             none

## 2024-02-19 NOTE — PATIENT PROFILE ADULT - FALL HARM RISK - HARM RISK INTERVENTIONS

## 2024-02-19 NOTE — H&P ADULT - NSHPSOCIALHISTORY_GEN_ALL_CORE
Social History:  Occupation: Unknown  Lives with: (  ) alone  (  ) children   (  ) spouse   (  ) parents  (  X) other: Unknown    Substance Use (street drugs): (  ) never used  ( X ) other: Unknown  Tobacco Usage:  ( X  ) never smoked   (   ) former smoker   (   ) current smoker  (     ) pack year  (        ) last cigarette date  Alcohol Usage: Daily EtOH use, Vodka. Last drink per pt 2 days ago. Social History:  Occupation: Unknown  Lives with: (  ) alone  (  ) children   (  ) spouse   (  ) parents  (  X) other: "family"    Substance Use (street drugs): (  ) never used  ( X ) other: Unknown  Tobacco Usage:  ( X  ) never smoked   (   ) former smoker   (   ) current smoker  (     ) pack year  (        ) last cigarette date  Alcohol Usage: Daily EtOH use, Vodka. Last drink per pt 2 days ago.

## 2024-02-19 NOTE — H&P ADULT - PROBLEM SELECTOR PLAN 1
- pt with known hx ETOH use d/o, multiple admissions in past for w/d; last drink 3d prior to arrival; drinks whiskey daily  - in ETOH w/d on arrival with tremors   - on CIWA protocol  - completing phenobarbital taper on 1/29  - encourage cessation   - SBIRT eval  - seizure/aspiration/fall precautions  - if mental status does not improve by end of taper, will obtain CT head (confusion may be secondary to delirium vs alcohol withdrawal vs effects of phenobarbital). - pt with known hx ETOH use d/o, multiple admissions in past for w/d; last drink 2d prior to arrival; drinks whiskey/vodka daily  - in ETOH w/d on arrival with tremors   - on CIWA protocol  - completing phenobarbital taper on 2/23  - encouraged cessation   - seizure/aspiration/fall precautions  - if mental status does not improve by end of taper, will obtain CT head  -Check alcohol level and ammonia level

## 2024-02-19 NOTE — H&P ADULT - PROBLEM SELECTOR PLAN 2
-Unclear cause with associated N/V  -Follow up CT A/P w/ IV contrast  -Zofran PRN -Unclear cause with associated N/V  -Follow up CT A/P w/ IV contrast  -TB normal, mild AST/lipase elevation, low suspicion for cholecystitis and pancreatitis   -Zofran PRN  -Morphine 2mg IV PRN

## 2024-02-19 NOTE — ED ADULT NURSE NOTE - OBJECTIVE STATEMENT
Received pt from triage, aox3, c/o abdominal pain, n/v, pt has hx of alcohol abuse Gastris , PUD . Vomited once in triage.

## 2024-02-19 NOTE — H&P ADULT - NSHPREVIEWOFSYSTEMS_GEN_ALL_CORE
Not able to obtain complete ROS due to EtOH encephalopathy     CONSTITUTIONAL: + weakness, no fevers or chills  EYES: No visual changes; No blurry vision  ENT:  No pain or stiffness; No vertigo or throat pain  RESPIRATORY: No cough, wheezing, hemoptysis; No shortness of breath  CARDIOVASCULAR: No chest pain or palpitations  GASTROINTESTINAL: +  epigastric pain. + nausea, + vomiting, no  hematemesis; No diarrhea or constipation. No melena or hematochezia.  GENITOURINARY: No dysuria, frequency or hematuria  NEUROLOGICAL: No numbness, No HA  SKIN: No itching, rashes  MSK: no joint pain, + muscle pain (diffuse, unable to pinpoint)

## 2024-02-19 NOTE — ED PROVIDER NOTE - CLINICAL SUMMARY MEDICAL DECISION MAKING FREE TEXT BOX
Patient with chronic alcoholism otherwise presenting for persistent nausea vomiting and abdominal discomfort.  Given Zofran fluids and Pepcid as well as Ativan with improvement of tachycardia CIWA improved.  Will otherwise admit to medicine team for further care Dr. Barbour.

## 2024-02-19 NOTE — H&P ADULT - PROBLEM SELECTOR PLAN 6
DVT ppx: Lovenox DVT ppx: Lovenox  Dispo: pending overall improvement  Order PT eval when mental status improves

## 2024-02-19 NOTE — ED ADULT TRIAGE NOTE - ISOLATION TYPE:
None Ilumya Counseling: I discussed with the patient the risks of tildrakizumab including but not limited to immunosuppression, malignancy, posterior leukoencephalopathy syndrome, and serious infections.  The patient understands that monitoring is required including a PPD at baseline and must alert us or the primary physician if symptoms of infection or other concerning signs are noted.

## 2024-02-19 NOTE — H&P ADULT - ASSESSMENT
57 yo M with PMH ETOH use d/o (daily whiskey), GERD, gastritis, and HLD p/w ETOH w/d on CIWA and phenobarbital taper.  58 year old man with PMH ETOH use d/o (daily whiskey/vodka), GERD, gastritis, and HLD p/w diffuse pain with associated N/V found to be in dehydration given elevated lactate. Pending further work up, unclear cause, potentially EtOH related.

## 2024-02-20 ENCOUNTER — TRANSCRIPTION ENCOUNTER (OUTPATIENT)
Age: 59
End: 2024-02-20

## 2024-02-20 VITALS
RESPIRATION RATE: 19 BRPM | DIASTOLIC BLOOD PRESSURE: 84 MMHG | OXYGEN SATURATION: 97 % | TEMPERATURE: 98 F | HEART RATE: 91 BPM | SYSTOLIC BLOOD PRESSURE: 133 MMHG

## 2024-02-20 LAB
ALBUMIN SERPL ELPH-MCNC: 2.8 G/DL — LOW (ref 3.3–5)
ALP SERPL-CCNC: 52 U/L — SIGNIFICANT CHANGE UP (ref 40–120)
ALT FLD-CCNC: 23 U/L — SIGNIFICANT CHANGE UP (ref 12–78)
AMMONIA BLD-MCNC: 54 UMOL/L — HIGH (ref 11–32)
ANION GAP SERPL CALC-SCNC: 7 MMOL/L — SIGNIFICANT CHANGE UP (ref 5–17)
AST SERPL-CCNC: 34 U/L — SIGNIFICANT CHANGE UP (ref 15–37)
BILIRUB SERPL-MCNC: 0.9 MG/DL — SIGNIFICANT CHANGE UP (ref 0.2–1.2)
BUN SERPL-MCNC: 10 MG/DL — SIGNIFICANT CHANGE UP (ref 7–23)
CALCIUM SERPL-MCNC: 8.1 MG/DL — LOW (ref 8.5–10.1)
CHLORIDE SERPL-SCNC: 107 MMOL/L — SIGNIFICANT CHANGE UP (ref 96–108)
CO2 SERPL-SCNC: 25 MMOL/L — SIGNIFICANT CHANGE UP (ref 22–31)
CREAT SERPL-MCNC: 0.7 MG/DL — SIGNIFICANT CHANGE UP (ref 0.5–1.3)
EGFR: 108 ML/MIN/1.73M2 — SIGNIFICANT CHANGE UP
GLUCOSE SERPL-MCNC: 86 MG/DL — SIGNIFICANT CHANGE UP (ref 70–99)
HCT VFR BLD CALC: 32.5 % — LOW (ref 39–50)
HGB BLD-MCNC: 10.3 G/DL — LOW (ref 13–17)
LACTATE SERPL-SCNC: 1 MMOL/L — SIGNIFICANT CHANGE UP (ref 0.7–2)
MAGNESIUM SERPL-MCNC: 1.7 MG/DL — SIGNIFICANT CHANGE UP (ref 1.6–2.6)
MCHC RBC-ENTMCNC: 24 PG — LOW (ref 27–34)
MCHC RBC-ENTMCNC: 31.7 G/DL — LOW (ref 32–36)
MCV RBC AUTO: 75.8 FL — LOW (ref 80–100)
NRBC # BLD: 0 /100 WBCS — SIGNIFICANT CHANGE UP (ref 0–0)
PHOSPHATE SERPL-MCNC: 2.6 MG/DL — SIGNIFICANT CHANGE UP (ref 2.5–4.5)
PLATELET # BLD AUTO: 164 K/UL — SIGNIFICANT CHANGE UP (ref 150–400)
POTASSIUM SERPL-MCNC: 3.6 MMOL/L — SIGNIFICANT CHANGE UP (ref 3.5–5.3)
POTASSIUM SERPL-SCNC: 3.6 MMOL/L — SIGNIFICANT CHANGE UP (ref 3.5–5.3)
PROT SERPL-MCNC: 6.7 GM/DL — SIGNIFICANT CHANGE UP (ref 6–8.3)
RBC # BLD: 4.29 M/UL — SIGNIFICANT CHANGE UP (ref 4.2–5.8)
RBC # FLD: 19.2 % — HIGH (ref 10.3–14.5)
SODIUM SERPL-SCNC: 139 MMOL/L — SIGNIFICANT CHANGE UP (ref 135–145)
WBC # BLD: 2.51 K/UL — LOW (ref 3.8–10.5)
WBC # FLD AUTO: 2.51 K/UL — LOW (ref 3.8–10.5)

## 2024-02-20 PROCEDURE — 99239 HOSP IP/OBS DSCHRG MGMT >30: CPT

## 2024-02-20 RX ORDER — ONDANSETRON 8 MG/1
1 TABLET, FILM COATED ORAL
Qty: 21 | Refills: 0
Start: 2024-02-20 | End: 2024-02-26

## 2024-02-20 RX ORDER — THIAMINE MONONITRATE (VIT B1) 100 MG
1 TABLET ORAL
Qty: 0 | Refills: 0 | DISCHARGE
Start: 2024-02-20

## 2024-02-20 RX ORDER — FOLIC ACID 0.8 MG
1 TABLET ORAL
Qty: 0 | Refills: 0 | DISCHARGE
Start: 2024-02-20

## 2024-02-20 RX ADMIN — Medication 1 GRAM(S): at 00:48

## 2024-02-20 RX ADMIN — PANTOPRAZOLE SODIUM 40 MILLIGRAM(S): 20 TABLET, DELAYED RELEASE ORAL at 08:02

## 2024-02-20 RX ADMIN — ENOXAPARIN SODIUM 40 MILLIGRAM(S): 100 INJECTION SUBCUTANEOUS at 11:57

## 2024-02-20 RX ADMIN — Medication 1 GRAM(S): at 11:56

## 2024-02-20 RX ADMIN — Medication 259.2 MILLIGRAM(S): at 00:50

## 2024-02-20 RX ADMIN — Medication 1 MILLIGRAM(S): at 11:55

## 2024-02-20 RX ADMIN — Medication 1 GRAM(S): at 08:02

## 2024-02-20 RX ADMIN — Medication 100 MILLIGRAM(S): at 11:56

## 2024-02-20 RX ADMIN — Medication 1 TABLET(S): at 11:55

## 2024-02-20 NOTE — DISCHARGE NOTE NURSING/CASE MANAGEMENT/SOCIAL WORK - PATIENT PORTAL LINK FT
You can access the FollowMyHealth Patient Portal offered by [facility] by registering at the following website: [email or web address]. By joining [facility]’s FollowMyHealth portal, you will also be able to view your health information using other applications (apps) compatible with our system.

## 2024-02-20 NOTE — DISCHARGE NOTE PROVIDER - ATTENDING DISCHARGE PHYSICAL EXAMINATION:
Vital Signs Last 24 Hrs  T(F): 98.4 (20 Feb 2024 11:17), Max: 98.7 (20 Feb 2024 01:22)  HR: 91 (20 Feb 2024 11:17) (67 - 91)  BP: 133/84 (20 Feb 2024 11:17) (118/60 - 158/91)  RR: 19 (20 Feb 2024 11:17) (18 - 20)  SpO2: 97% (20 Feb 2024 11:17) (97% - 100%)    Physical Exam:  Constitutional: NAD, awake and alert  Respiratory: cta b/l no wheezing no rhonchi  Cardiovascular: +s1/s2 no edema b/l le  Gastrointestinal: soft nt nd bs+  Vascular: 2+ peripheral pulses  Neurological: A/O x 3, no focal deficits

## 2024-02-20 NOTE — DISCHARGE NOTE PROVIDER - NSDCCPCAREPLAN_GEN_ALL_CORE_FT
PRINCIPAL DISCHARGE DIAGNOSIS  Diagnosis: Alcohol withdrawal  Assessment and Plan of Treatment: Refrain from alcohol use.     PRINCIPAL DISCHARGE DIAGNOSIS  Diagnosis: Alcohol dependence with withdrawal  Assessment and Plan of Treatment: S/p phenobarb treatment. Patient clinically better.      SECONDARY DISCHARGE DIAGNOSES  Diagnosis: GERD (gastroesophageal reflux disease)  Assessment and Plan of Treatment:     Diagnosis: Acute lactic acidosis  Assessment and Plan of Treatment: In the setting of alcohol withdrawal.

## 2024-02-20 NOTE — DISCHARGE NOTE PROVIDER - HOSPITAL COURSE
58 year old man with PMH ETOH use d/o (daily whiskey/vodka), GERD, gastritis, and HLD p/w diffuse pain with associated N/V found to be in dehydration     #Dehydration with lactic acidosis  #Alcohol dependence with withdrawal      Stable for home 58 year old man with PMH ETOH use d/o (daily whiskey/vodka), GERD, gastritis, and HLD p/w diffuse pain with associated N/V found to be in dehydration.    #Dehydration with lactic acidosis  #Alcohol dependence with withdrawal      Stable for home

## 2024-02-20 NOTE — DISCHARGE NOTE PROVIDER - REASON FOR ADMISSION
"Adult Nutrition  Assessment    Patient Name:  Rhae Sutton  YOB: 1968  MRN: [id number]  Admit Date:  10/12/2017    Assessment Date:  10/13/2017    Comments:  BMI 42 with pt at 197% IBW.  Making Lifestyle Choices for a Healthier Weight used to provide ed regarding Wt Loss Diet and diet copy given.          Reason for Assessment       10/13/17 1212    Reason for Assessment    Reason For Assessment/Visit education;per organizational policy    Identified At Risk By Screening Criteria need for education;BMI                  Anthropometrics       10/13/17 1213    Anthropometrics    Height 170.2 cm (67.01\")    Weight 123 kg (271 lb 2.7 oz)    Ideal Body Weight (IBW)    Ideal Body Weight (IBW), Female 62.28    % Ideal Body Weight 197.91    Body Mass Index (BMI)    BMI (kg/m2) 42.55    BMI Grade greater than 40 - obesity grade III                Estimated/Assessed Needs       10/13/17 1214    Calculation Measurements    Weight Used For Calculations 76.8 kg (169 lb 5 oz)    Height Used for Calculations 1.702 m (5' 7.01\")    Estimated/Assessed Energy Needs    Energy Need Method Spink-St Jeor    Age 48    RMR (Spink-St. Jeor Equation) 1440.75    Activity Factors (Spink St Jeor)  Out of bed, ambulatory  1.3    Total estimated needs (Spink St. Jeor) 1872    Estimated/Assessed Protein Needs    Weight Used for Protein Calculation 76.8 kg (169 lb 5 oz)    Protein (gm/kg) 1.2    1.2 Gm Protein (gm) 92.16    Estimated Protein Range 76 - 92    Estimated/Assessed Fluid Needs    Fluid Need Method --   4962                Electronically signed by:  Benita Toscano RD  10/13/17 12:18 PM  " Diffuse pain

## 2024-02-20 NOTE — DISCHARGE NOTE PROVIDER - NSDCMRMEDTOKEN_GEN_ALL_CORE_FT
atorvastatin 20 mg oral tablet: 1 tab(s) orally once a day  folic acid 1 mg oral tablet: 1 tab(s) orally once a day  Multiple Vitamins oral tablet: 1 tab(s) orally once a day  pantoprazole 40 mg oral delayed release tablet: 1 tab(s) orally once a day (before a meal) Date filled 12/20/23, 30 days  sucralfate 1 g oral tablet: 1 tab(s) orally 4 times a day  thiamine 100 mg oral tablet: 1 tab(s) orally once a day   atorvastatin 20 mg oral tablet: 1 tab(s) orally once a day  folic acid 1 mg oral tablet: 1 tab(s) orally once a day  Multiple Vitamins oral tablet: 1 tab(s) orally once a day  ondansetron 4 mg oral tablet: 1 tab(s) orally every 8 hours as needed for  nausea  pantoprazole 40 mg oral delayed release tablet: 1 tab(s) orally once a day (before a meal) Date filled 12/20/23, 30 days  sucralfate 1 g oral tablet: 1 tab(s) orally 4 times a day  thiamine 100 mg oral tablet: 1 tab(s) orally once a day

## 2024-02-20 NOTE — DISCHARGE NOTE NURSING/CASE MANAGEMENT/SOCIAL WORK - NSDCVIVACCINE_GEN_ALL_CORE_FT
COVID-19, mRNA, LNP-S, PF, 100 mcg/ 0.5 mL dose (Moderna); 10-Jovan-2021 15:38; Reji Hernandez (RN); [employer] [id number] (Exp. Date: 13-Jul-2021); IntraMuscular; Deltoid Right.; 0.5 milliLiter(s);   influenza, injectable, quadrivalent, preservative free; 31-Jan-2019 15:53; Ayaka Bynum (RN); [employer]; [id number] (Exp. Date: 30-Jun-2019); IntraMuscular; Deltoid Right.; 0.5 milliLiter(s); VIS (VIS Published: 07-Aug-2015, VIS Presented: 31-Jan-2019);   influenza, injectable, quadrivalent, preservative free; 10-Nov-2019 14:13; Laverne Lane (RN); [employer]; [id number] (Exp. Date: 30-Jun-2020); IntraMuscular; Deltoid Left.; 0.5 milliLiter(s); VIS (VIS Published: 15-Aug-2019, VIS Presented: 10-Nov-2019);   influenza, injectable, quadrivalent, preservative free; 13-Oct-2020 11:56; Kimberli Cronin (RN); [employer]; [id number] (Exp. Date: 30-Jun-2021); IntraMuscular; Deltoid Right.; 0.5 milliLiter(s); VIS (VIS Published: 15-Aug-2019, VIS Presented: 13-Oct-2020);   Td (adult) preservative free; 18-Jan-2020 20:04; Yulia Vance (RN); [employer]; [id number] (Exp. Date: 19-Jan-2021); IntraMuscular; Deltoid Right.; 0.5 milliLiter(s); VIS (VIS Published: 18-Jan-2020, VIS Presented: 18-Jan-2020);

## 2024-02-23 NOTE — PATIENT PROFILE ADULT - BRADEN ACTIVITY
CONST: nontoxic NAD disheveled calm cooperative speaking in full sentences  HEAD: atraumatic  EYES: conjunctivae clear  NECK: supple  ENMT: dry mouth  CARD: tachycardia  CHEST: breathing comfortably, no stridor/retractions/tripoding, clear lung sounds no wheezing or crackles  EXT: FROM  SKIN: warm, dry, many scabs and scratches  NEURO: awake alert answering questions following commands moving all extremities ambulating independently
(3) walks occasionally

## 2024-02-26 DIAGNOSIS — K21.9 GASTRO-ESOPHAGEAL REFLUX DISEASE WITHOUT ESOPHAGITIS: ICD-10-CM

## 2024-02-26 DIAGNOSIS — E86.0 DEHYDRATION: ICD-10-CM

## 2024-02-26 DIAGNOSIS — F10.239 ALCOHOL DEPENDENCE WITH WITHDRAWAL, UNSPECIFIED: ICD-10-CM

## 2024-02-26 DIAGNOSIS — R63.8 OTHER SYMPTOMS AND SIGNS CONCERNING FOOD AND FLUID INTAKE: ICD-10-CM

## 2024-02-26 DIAGNOSIS — E87.20 ACIDOSIS, UNSPECIFIED: ICD-10-CM

## 2024-02-26 DIAGNOSIS — R10.9 UNSPECIFIED ABDOMINAL PAIN: ICD-10-CM

## 2024-03-18 NOTE — ED ADULT NURSE NOTE - NSICDXPASTSURGICALHX_GEN_ALL_CORE_FT
- Held home meds: Amlodipine, Enalapril, HCTZ  - If hypertensive in patient, will consider adding back home medications  - Hydral PRN ordered      PAST SURGICAL HISTORY:  No significant past surgical history

## 2024-03-20 NOTE — PATIENT PROFILE ADULT - HOME ACCESSIBILITY CONCERNS
[facility] Orthopedics & Sports Medicine      [facility] PHYSICIANS [facility], [facility]  [facility] ORTHOPAEDICS AND SPORTS MEDICINE  [address]  [place] [address]     Surgery:    3/7/2024  Left Middle And Ring Finger Incision And Drainage, With Extensor Tendon And Nail Bed Repair Left Ring Finger - Left    History of Present Illness:    This is a 76 y.o. male who presents to the clinic today for post op follow up for Left Middle And Ring Finger Incision And Drainage, With Extensor Tendon And Nail Bed Repair Left Ring Finger - Left on 3/7/2024 .  He is doing well postoperative no complications.    On examination all his lacerations are well-approximated with no erythema no drainage.  He does have a lot of stiffness around the DIP joints of the ring and long finger.  The ring finger does have some extensor lag of probably 10 or 15 degrees.  It can passively be corrected.  He can get the tip to the palm with some stiffness again at the DIP joints.    At this point I think he is doing well we will remove his sutures today.  Will give him a stack splint to wear on the ring finger over the next 6 weeks to help the scar tissue healing and in a more extended position to prevent a mallet finger.  I did tell him we will have to watch the dorsal skin closely for irritation by the stack splint.  At this point he understood this we can see him back as needed if he has any questions or concerns.  Any signs of infection include increasing redness drainage or increasing pain were described.  He felt comfortable with this we will see him back as needed          Electronically signed by Talha Kumar MD on 3/20/2024 at 11:21 AM  
none

## 2024-03-26 ENCOUNTER — EMERGENCY (EMERGENCY)
Facility: HOSPITAL | Age: 59
LOS: 0 days | Discharge: HOME | End: 2024-03-26
Attending: EMERGENCY MEDICINE
Payer: MEDICAID

## 2024-03-26 VITALS
SYSTOLIC BLOOD PRESSURE: 143 MMHG | RESPIRATION RATE: 17 BRPM | DIASTOLIC BLOOD PRESSURE: 93 MMHG | OXYGEN SATURATION: 99 % | TEMPERATURE: 98 F | HEART RATE: 91 BPM

## 2024-03-26 VITALS
HEART RATE: 114 BPM | OXYGEN SATURATION: 97 % | RESPIRATION RATE: 19 BRPM | TEMPERATURE: 98 F | WEIGHT: 169.98 LBS | SYSTOLIC BLOOD PRESSURE: 152 MMHG | DIASTOLIC BLOOD PRESSURE: 102 MMHG | HEIGHT: 66 IN

## 2024-03-26 DIAGNOSIS — Z87.19 PERSONAL HISTORY OF OTHER DISEASES OF THE DIGESTIVE SYSTEM: ICD-10-CM

## 2024-03-26 DIAGNOSIS — R10.13 EPIGASTRIC PAIN: ICD-10-CM

## 2024-03-26 DIAGNOSIS — R10.9 UNSPECIFIED ABDOMINAL PAIN: ICD-10-CM

## 2024-03-26 DIAGNOSIS — R11.2 NAUSEA WITH VOMITING, UNSPECIFIED: ICD-10-CM

## 2024-03-26 DIAGNOSIS — F10.129 ALCOHOL ABUSE WITH INTOXICATION, UNSPECIFIED: ICD-10-CM

## 2024-03-26 DIAGNOSIS — R00.0 TACHYCARDIA, UNSPECIFIED: ICD-10-CM
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
## 2024-03-26 PROBLEM — R74.8 ABNORMAL LEVELS OF OTHER SERUM ENZYMES: Chronic | Status: ACTIVE | Noted: 2024-02-19

## 2024-03-26 LAB
ALBUMIN SERPL ELPH-MCNC: 3.8 G/DL — SIGNIFICANT CHANGE UP (ref 3.3–5)
ALP SERPL-CCNC: 58 U/L — SIGNIFICANT CHANGE UP (ref 40–120)
ALT FLD-CCNC: 33 U/L — SIGNIFICANT CHANGE UP (ref 12–78)
ANION GAP SERPL CALC-SCNC: 16 MMOL/L — SIGNIFICANT CHANGE UP (ref 5–17)
AST SERPL-CCNC: 51 U/L — HIGH (ref 15–37)
BASOPHILS # BLD AUTO: 0.03 K/UL — SIGNIFICANT CHANGE UP (ref 0–0.2)
BASOPHILS NFR BLD AUTO: 0.9 % — SIGNIFICANT CHANGE UP (ref 0–2)
BILIRUB SERPL-MCNC: 0.6 MG/DL — SIGNIFICANT CHANGE UP (ref 0.2–1.2)
BUN SERPL-MCNC: 11 MG/DL — SIGNIFICANT CHANGE UP (ref 7–23)
CALCIUM SERPL-MCNC: 9.9 MG/DL — SIGNIFICANT CHANGE UP (ref 8.5–10.1)
CHLORIDE SERPL-SCNC: 101 MMOL/L — SIGNIFICANT CHANGE UP (ref 96–108)
CO2 SERPL-SCNC: 23 MMOL/L — SIGNIFICANT CHANGE UP (ref 22–31)
CREAT SERPL-MCNC: 1.25 MG/DL — SIGNIFICANT CHANGE UP (ref 0.5–1.3)
EGFR: 68 ML/MIN/1.73M2 — SIGNIFICANT CHANGE UP
EOSINOPHIL # BLD AUTO: 0.03 K/UL — SIGNIFICANT CHANGE UP (ref 0–0.5)
EOSINOPHIL NFR BLD AUTO: 0.9 % — SIGNIFICANT CHANGE UP (ref 0–6)
ETHANOL SERPL-MCNC: 307 MG/DL — HIGH (ref 0–10)
GLUCOSE SERPL-MCNC: 118 MG/DL — HIGH (ref 70–99)
HCT VFR BLD CALC: 37.2 % — LOW (ref 39–50)
HGB BLD-MCNC: 12.2 G/DL — LOW (ref 13–17)
IMM GRANULOCYTES NFR BLD AUTO: 0 % — SIGNIFICANT CHANGE UP (ref 0–0.9)
LIDOCAIN IGE QN: 84 U/L — HIGH (ref 13–75)
LYMPHOCYTES # BLD AUTO: 2.04 K/UL — SIGNIFICANT CHANGE UP (ref 1–3.3)
LYMPHOCYTES # BLD AUTO: 59.3 % — HIGH (ref 13–44)
MAGNESIUM SERPL-MCNC: 1.6 MG/DL — SIGNIFICANT CHANGE UP (ref 1.6–2.6)
MCHC RBC-ENTMCNC: 23.5 PG — LOW (ref 27–34)
MCHC RBC-ENTMCNC: 32.8 G/DL — SIGNIFICANT CHANGE UP (ref 32–36)
MCV RBC AUTO: 71.5 FL — LOW (ref 80–100)
MONOCYTES # BLD AUTO: 0.24 K/UL — SIGNIFICANT CHANGE UP (ref 0–0.9)
MONOCYTES NFR BLD AUTO: 7 % — SIGNIFICANT CHANGE UP (ref 2–14)
NEUTROPHILS # BLD AUTO: 1.1 K/UL — LOW (ref 1.8–7.4)
NEUTROPHILS NFR BLD AUTO: 31.9 % — LOW (ref 43–77)
NRBC # BLD: 0 /100 WBCS — SIGNIFICANT CHANGE UP (ref 0–0)
PLATELET # BLD AUTO: 199 K/UL — SIGNIFICANT CHANGE UP (ref 150–400)
POTASSIUM SERPL-MCNC: 3.9 MMOL/L — SIGNIFICANT CHANGE UP (ref 3.5–5.3)
POTASSIUM SERPL-SCNC: 3.9 MMOL/L — SIGNIFICANT CHANGE UP (ref 3.5–5.3)
PROT SERPL-MCNC: 8.7 GM/DL — HIGH (ref 6–8.3)
RBC # BLD: 5.2 M/UL — SIGNIFICANT CHANGE UP (ref 4.2–5.8)
RBC # FLD: 19.7 % — HIGH (ref 10.3–14.5)
SODIUM SERPL-SCNC: 140 MMOL/L — SIGNIFICANT CHANGE UP (ref 135–145)
WBC # BLD: 3.44 K/UL — LOW (ref 3.8–10.5)
WBC # FLD AUTO: 3.44 K/UL — LOW (ref 3.8–10.5)

## 2024-03-26 PROCEDURE — 99285 EMERGENCY DEPT VISIT HI MDM: CPT

## 2024-03-26 PROCEDURE — 93010 ELECTROCARDIOGRAM REPORT: CPT

## 2024-03-26 RX ORDER — SODIUM CHLORIDE 9 MG/ML
1000 INJECTION INTRAMUSCULAR; INTRAVENOUS; SUBCUTANEOUS ONCE
Refills: 0 | Status: COMPLETED | OUTPATIENT
Start: 2024-03-26 | End: 2024-03-26

## 2024-03-26 RX ORDER — FOLIC ACID 0.8 MG
1 TABLET ORAL ONCE
Refills: 0 | Status: COMPLETED | OUTPATIENT
Start: 2024-03-26 | End: 2024-03-26

## 2024-03-26 RX ORDER — PANTOPRAZOLE SODIUM 20 MG/1
40 TABLET, DELAYED RELEASE ORAL ONCE
Refills: 0 | Status: COMPLETED | OUTPATIENT
Start: 2024-03-26 | End: 2024-03-26

## 2024-03-26 RX ORDER — ONDANSETRON 8 MG/1
4 TABLET, FILM COATED ORAL ONCE
Refills: 0 | Status: COMPLETED | OUTPATIENT
Start: 2024-03-26 | End: 2024-03-26

## 2024-03-26 RX ORDER — KETOROLAC TROMETHAMINE 30 MG/ML
15 SYRINGE (ML) INJECTION ONCE
Refills: 0 | Status: DISCONTINUED | OUTPATIENT
Start: 2024-03-26 | End: 2024-03-26

## 2024-03-26 RX ORDER — THIAMINE MONONITRATE (VIT B1) 100 MG
100 TABLET ORAL ONCE
Refills: 0 | Status: COMPLETED | OUTPATIENT
Start: 2024-03-26 | End: 2024-03-26

## 2024-03-26 RX ORDER — ONDANSETRON 8 MG/1
1 TABLET, FILM COATED ORAL
Qty: 9 | Refills: 0
Start: 2024-03-26 | End: 2024-03-28

## 2024-03-26 RX ADMIN — Medication 15 MILLIGRAM(S): at 22:15

## 2024-03-26 RX ADMIN — SODIUM CHLORIDE 1000 MILLILITER(S): 9 INJECTION INTRAMUSCULAR; INTRAVENOUS; SUBCUTANEOUS at 20:42

## 2024-03-26 RX ADMIN — ONDANSETRON 4 MILLIGRAM(S): 8 TABLET, FILM COATED ORAL at 22:25

## 2024-03-26 RX ADMIN — ONDANSETRON 4 MILLIGRAM(S): 8 TABLET, FILM COATED ORAL at 19:31

## 2024-03-26 RX ADMIN — Medication 2 MILLIGRAM(S): at 19:46

## 2024-03-26 RX ADMIN — Medication 30 MILLILITER(S): at 17:44

## 2024-03-26 RX ADMIN — Medication 50 MILLIGRAM(S): at 17:45

## 2024-03-26 RX ADMIN — Medication 100 MILLIGRAM(S): at 19:45

## 2024-03-26 RX ADMIN — PANTOPRAZOLE SODIUM 40 MILLIGRAM(S): 20 TABLET, DELAYED RELEASE ORAL at 19:30

## 2024-03-26 RX ADMIN — Medication 1 MILLIGRAM(S): at 19:45

## 2024-03-26 NOTE — ED PROVIDER NOTE - PROGRESS NOTE DETAILS
Pt has been walking with normal gaits without assists in the hallway repeat ciwa 2 now. pt tolerate water orally abd is soft nontender to palp at all quadrants.

## 2024-03-26 NOTE — ED ADULT NURSE NOTE - NSICDXPASTSURGICALHX_GEN_ALL_CORE_FT
SUBJECTIVE:    Judy Hirsch is a pleasant 74 year old male who presents in the Vascular Clinic today for follow up of dissecting superior mesenteric artery aneurysm, questionable history of polymyalgia rheumatica, history of coronary disease requiring the use of antithrombotic agents, history of abdominal pain and GI bleed associated with ulceration (now resolved).    The patient currently denies abdominal pain, postprandial or fasting pain, melena, hematochezia, hematemesis, diarrhea or constipation.  Denies back pain, neck pain, shortness of breath, fatigue or chest pain.      He continues to be on Plavix for his history of coronary disease. He has no signs or symptoms of GI bleeding or overt anemia.     He has no signs or symptoms of rapidly expanding or significantly symptomatic SMA aneurysm.  A follow up mesenteric artery duplex US performed today showed stability of his dissecting SMA aneurysm with no evidence of disease progression.    Review of systems otherwise negative.    ALLERGIES AND MEDICATIONS:  Current Outpatient Prescriptions   Medication Sig   • metoPROLOL (TOPROL-XL) 50 MG 24 hr tablet Take 0.5 tablets by mouth daily.   • valsartan (DIOVAN) 40 MG tablet Take 1 tablet by mouth daily.   • tamsulosin (FLOMAX) 0.4 MG Cap Take 0.4 mg by mouth daily after a meal.   • clonazePAM (KLONOPIN) 0.5 MG tablet Take 0.5 mg by mouth as needed for Anxiety.   • clopidogrel (PLAVIX) 75 MG tablet Take 75 mg by mouth daily.   • atorvastatin (LIPITOR) 20 MG tablet Take 1 tablet by mouth daily.   • nitroGLYcerin (NITROSTAT) 0.4 MG SL tablet Place 1 tablet under the tongue every 5 minutes as needed for Chest pain.     No current facility-administered medications for this visit.        ALLERGIES:   Allergen Reactions   • Aspirin      GI bleeding    • Penicillins      Hot flush, numbness    • Ibuprofen      PHYSICAL EXAMINATION:  VITAL SIGNS:  Blood pressure (!) 140/92, pulse 64, resp. rate 12, height 5' 2\" (1.575 m),  weight 68.3 kg, SpO2 97 %.  GENERAL:  In no acute distress  RESPIRATORY:  No respiratory distress, normal breath sounds, no rales, no wheezing.  CARDIOVASCULAR:  Normal rate, normal rhythm, no murmurs, no gallops, no rubs.  ABDOMEN:  Normal abdomen exam, bowel sounds normal, soft.  No tenderness, no masses, no pulsatile masses. No rebound or organomegaly.   NEUROLOGIC AND PSYCHIATRIC: Alert and oriented x3. Mood and affect are appropriate.       VASCULAR IMAGING:  Mesenteric duplex ultrasound performed at the vascular Center today showed a stable dissecting SMA aneurysm.      ASSESSMENT:    1. Dissection of mesenteric artery    2. Superior mesenteric artery aneurysm    3. CAD in native artery    4. Antiplatelet or antithrombotic long-term use        RECOMMENDATIONS:  1.  The patient's SMA aneurysm remains stable without significant expansion or symptoms.  There is no indication for intervention at this time.  Will continue to monitor yearly with ultrasound imaging of the SMA to assess for expansion.  2.  Patient to remain on Plavix daily given his history of CAD.  3.  The patient is stable from a CAD standpoint.  He denies any chest pain, shortness of breath or anginal symptoms.  He will continue to follow up with Dr. Brady.    Follow up in 1 year or sooner if needed. All questions answered.   The patient was accompanied by an Indonesian .      CAPRI VAZQUEZ MD           PAST SURGICAL HISTORY:  No significant past surgical history

## 2024-03-26 NOTE — ED ADULT NURSE NOTE - OBJECTIVE STATEMENT
58 years old male AOx3. C/o generalized abdominal pain 10/10.  Hx of alcohol abuse. Reports N/V. States his last drink of alcohol was two days ago. Pt denies recent hx of trauma, headache, dizziness, blurred visions, light sensitivities, focal/distal weakness or numbness, neck/back/hips/calfs pain, cough, sob, chest pain, fever, chills, dysuria, hematuria, or abnormal stool color. Ambulates with a steady gait.

## 2024-03-26 NOTE — ED PROVIDER NOTE - CLINICAL SUMMARY MEDICAL DECISION MAKING FREE TEXT BOX
pt here with hx of gastritis alcohol abuse c/o abd pain n/v pt's last drink of alcohol was two days ago. Pt appears very comfortable Labs, ekg and ciwa score are ordered. pt here with hx of gastritis alcohol abuse c/o abd pain n/v pt's last drink of alcohol was two days ago. Pt appears very comfortable Labs, ekg and ciwa score are ordered.  alcohol 304 pt 's repeat ciwa score is two pt is ambulating with normal gaits without assistance pt's repeat ciwa is two now.

## 2024-03-26 NOTE — ED ADULT TRIAGE NOTE - CHIEF COMPLAINT QUOTE
BIBA from home for abdominal pain, as per emt, last drink was 3 days ago, patient noted with tremors 18-Jun-2021 00:35

## 2024-03-26 NOTE — ED PROVIDER NOTE - CONSTITUTIONAL, MLM
normal... Well appearing, awake, alert, oriented to person, place, time/situation and in no apparent distress. Speaking in clear full sentences no active vomiting, not holding his head/chest/abdomen, appears comfortable lying in the stretcher in a bright light room

## 2024-03-26 NOTE — ED PROVIDER NOTE - PATIENT PORTAL LINK FT
You can access the FollowMyHealth Patient Portal offered by [facility] by registering at the following website: [email or web address]. By joining [facility] FollowMyHealth portal, you will also be able to view your health information using other applications (apps) compatible with our system.

## 2024-03-26 NOTE — ED ADULT NURSE NOTE - CHIEF COMPLAINT QUOTE
BIBA from home for abdominal pain, as per emt, last drink was 3 days ago, patient noted with tremors

## 2024-03-26 NOTE — ED ADULT NURSE NOTE - NSIMPLEMENTINTERV_GEN_ALL_ED
Patient notified of recommendation listed below.    Implemented All Fall Risk Interventions:  [place] to call system. Call bell, personal items and telephone within reach. Instruct patient to call for assistance. Room bathroom lighting operational. Non-slip footwear when patient is off stretcher. Physically safe environment: no spills, clutter or unnecessary equipment. Stretcher in lowest position, wheels locked, appropriate side rails in place. Provide visual cue, wrist band, yellow gown, etc. Monitor gait and stability. Monitor for mental status changes and reorient to person, place, and time. Review medications for side effects contributing to fall risk. Reinforce activity limits and safety measures with patient and family.

## 2024-03-26 NOTE — ED PROVIDER NOTE - OBJECTIVE STATEMENT
58 years old male by ems from home with hx of alcohol abuse c/o diffused abd pain nausea, nonbloody vomiting Pt sts his last drink of alcohol was two days ago. Pt denies recent hx of trauma, headache, dizziness, blurred visions, light sensitivities, focal/distal weakness or numbness, neck/back/hips/calfs pain, cough, sob, chest pain, fever, chills, dysuria, hematuria, or abnormal stool color.

## 2024-03-26 NOTE — ED ADULT NURSE NOTE - ED COMFORT CARE
Please notify  of results.  The pathology results demonstrated Tubular adenoma.  Consider follow up in 5 years. Sooner if new symptoms or change in family history.    Patient informed

## 2024-03-29 ENCOUNTER — INPATIENT (INPATIENT)
Facility: HOSPITAL | Age: 59
LOS: 9 days | Discharge: HOME | End: 2024-04-08
Attending: INTERNAL MEDICINE | Admitting: INTERNAL MEDICINE
Payer: MEDICAID

## 2024-03-29 VITALS
RESPIRATION RATE: 20 BRPM | HEIGHT: 66 IN | WEIGHT: 199.96 LBS | HEART RATE: 108 BPM | DIASTOLIC BLOOD PRESSURE: 95 MMHG | SYSTOLIC BLOOD PRESSURE: 150 MMHG | TEMPERATURE: 98 F | OXYGEN SATURATION: 98 %

## 2024-03-29 LAB
ALBUMIN SERPL ELPH-MCNC: 4 G/DL — SIGNIFICANT CHANGE UP (ref 3.3–5)
ALP SERPL-CCNC: 60 U/L — SIGNIFICANT CHANGE UP (ref 40–120)
ALT FLD-CCNC: 39 U/L — SIGNIFICANT CHANGE UP (ref 12–78)
ANION GAP SERPL CALC-SCNC: 10 MMOL/L — SIGNIFICANT CHANGE UP (ref 5–17)
AST SERPL-CCNC: 61 U/L — HIGH (ref 15–37)
BASOPHILS # BLD AUTO: 0 K/UL — SIGNIFICANT CHANGE UP (ref 0–0.2)
BASOPHILS NFR BLD AUTO: 0 % — SIGNIFICANT CHANGE UP (ref 0–2)
BILIRUB SERPL-MCNC: 0.6 MG/DL — SIGNIFICANT CHANGE UP (ref 0.2–1.2)
BUN SERPL-MCNC: 14 MG/DL — SIGNIFICANT CHANGE UP (ref 7–23)
CALCIUM SERPL-MCNC: 8.9 MG/DL — SIGNIFICANT CHANGE UP (ref 8.5–10.1)
CHLORIDE SERPL-SCNC: 107 MMOL/L — SIGNIFICANT CHANGE UP (ref 96–108)
CO2 SERPL-SCNC: 24 MMOL/L — SIGNIFICANT CHANGE UP (ref 22–31)
CREAT SERPL-MCNC: 1.29 MG/DL — SIGNIFICANT CHANGE UP (ref 0.5–1.3)
EGFR: 65 ML/MIN/1.73M2 — SIGNIFICANT CHANGE UP
EOSINOPHIL # BLD AUTO: 0 K/UL — SIGNIFICANT CHANGE UP (ref 0–0.5)
EOSINOPHIL NFR BLD AUTO: 0 % — SIGNIFICANT CHANGE UP (ref 0–6)
ETHANOL SERPL-MCNC: 433 MG/DL — HIGH (ref 0–10)
GLUCOSE SERPL-MCNC: 114 MG/DL — HIGH (ref 70–99)
HCT VFR BLD CALC: 42.8 % — SIGNIFICANT CHANGE UP (ref 39–50)
HGB BLD-MCNC: 13.7 G/DL — SIGNIFICANT CHANGE UP (ref 13–17)
LIDOCAIN IGE QN: 124 U/L — HIGH (ref 13–75)
LYMPHOCYTES # BLD AUTO: 1.59 K/UL — SIGNIFICANT CHANGE UP (ref 1–3.3)
LYMPHOCYTES # BLD AUTO: 52 % — HIGH (ref 13–44)
MANUAL SMEAR VERIFICATION: SIGNIFICANT CHANGE UP
MCHC RBC-ENTMCNC: 23.4 PG — LOW (ref 27–34)
MCHC RBC-ENTMCNC: 32 G/DL — SIGNIFICANT CHANGE UP (ref 32–36)
MCV RBC AUTO: 73 FL — LOW (ref 80–100)
MONOCYTES # BLD AUTO: 0.15 K/UL — SIGNIFICANT CHANGE UP (ref 0–0.9)
MONOCYTES NFR BLD AUTO: 5 % — SIGNIFICANT CHANGE UP (ref 2–14)
NEUTROPHILS # BLD AUTO: 1.1 K/UL — LOW (ref 1.8–7.4)
NEUTROPHILS NFR BLD AUTO: 36 % — LOW (ref 43–77)
NRBC # BLD: 0 /100 WBCS — SIGNIFICANT CHANGE UP (ref 0–0)
NRBC # BLD: SIGNIFICANT CHANGE UP /100 WBCS (ref 0–0)
PLAT MORPH BLD: NORMAL — SIGNIFICANT CHANGE UP
PLATELET # BLD AUTO: 155 K/UL — SIGNIFICANT CHANGE UP (ref 150–400)
PLATELET COUNT - ESTIMATE: NORMAL — SIGNIFICANT CHANGE UP
POTASSIUM SERPL-MCNC: 4.1 MMOL/L — SIGNIFICANT CHANGE UP (ref 3.5–5.3)
POTASSIUM SERPL-SCNC: 4.1 MMOL/L — SIGNIFICANT CHANGE UP (ref 3.5–5.3)
PROT SERPL-MCNC: 9.3 GM/DL — HIGH (ref 6–8.3)
RBC # BLD: 5.86 M/UL — HIGH (ref 4.2–5.8)
RBC # FLD: 20.9 % — HIGH (ref 10.3–14.5)
RBC BLD AUTO: NORMAL — SIGNIFICANT CHANGE UP
SODIUM SERPL-SCNC: 141 MMOL/L — SIGNIFICANT CHANGE UP (ref 135–145)
VARIANT LYMPHS # BLD: 7 % — HIGH (ref 0–6)
WBC # BLD: 3.06 K/UL — LOW (ref 3.8–10.5)
WBC # FLD AUTO: 3.06 K/UL — LOW (ref 3.8–10.5)

## 2024-03-29 PROCEDURE — 99285 EMERGENCY DEPT VISIT HI MDM: CPT

## 2024-03-29 PROCEDURE — 93010 ELECTROCARDIOGRAM REPORT: CPT

## 2024-03-29 RX ORDER — HALOPERIDOL DECANOATE 100 MG/ML
5 INJECTION INTRAMUSCULAR ONCE
Refills: 0 | Status: COMPLETED | OUTPATIENT
Start: 2024-03-29 | End: 2024-03-29

## 2024-03-29 RX ORDER — SODIUM CHLORIDE 9 MG/ML
1000 INJECTION INTRAMUSCULAR; INTRAVENOUS; SUBCUTANEOUS ONCE
Refills: 0 | Status: COMPLETED | OUTPATIENT
Start: 2024-03-29 | End: 2024-03-29

## 2024-03-29 RX ORDER — FAMOTIDINE 10 MG/ML
20 INJECTION INTRAVENOUS ONCE
Refills: 0 | Status: COMPLETED | OUTPATIENT
Start: 2024-03-29 | End: 2024-03-29

## 2024-03-29 RX ORDER — ONDANSETRON 8 MG/1
4 TABLET, FILM COATED ORAL ONCE
Refills: 0 | Status: COMPLETED | OUTPATIENT
Start: 2024-03-29 | End: 2024-03-29

## 2024-03-29 RX ADMIN — Medication 2 MILLIGRAM(S): at 18:42

## 2024-03-29 RX ADMIN — HALOPERIDOL DECANOATE 5 MILLIGRAM(S): 100 INJECTION INTRAMUSCULAR at 19:28

## 2024-03-29 RX ADMIN — ONDANSETRON 4 MILLIGRAM(S): 8 TABLET, FILM COATED ORAL at 18:42

## 2024-03-29 RX ADMIN — SODIUM CHLORIDE 1000 MILLILITER(S): 9 INJECTION INTRAMUSCULAR; INTRAVENOUS; SUBCUTANEOUS at 19:45

## 2024-03-29 RX ADMIN — FAMOTIDINE 20 MILLIGRAM(S): 10 INJECTION INTRAVENOUS at 18:42

## 2024-03-29 RX ADMIN — SODIUM CHLORIDE 1000 MILLILITER(S): 9 INJECTION INTRAMUSCULAR; INTRAVENOUS; SUBCUTANEOUS at 18:45

## 2024-03-29 RX ADMIN — Medication 2 MILLIGRAM(S): at 21:48

## 2024-03-29 NOTE — ED PROVIDER NOTE - CLINICAL SUMMARY MEDICAL DECISION MAKING FREE TEXT BOX
59 y/o M presenting to the ED w/ PMH ETOH abuse, gastritis, multiple ED visits, presenting to the ED w/ intoxication.   Patient tachycardic, appears intoxicated.  Will obtain labs, allow for pain control  Plan for ativan/haldol for sedation and symptom control

## 2024-03-29 NOTE — ED ADULT NURSE NOTE - ED STAT RN HANDOFF DETAILS
Report given to receiving RN parker ,pts history, current condition and reason for admission discussed, safety concerns addressed and reviewed, pt currently in stable condition, IV flushes for patency and site shows no signs or symptoms of infiltrate, dressing is clean dry and intact, pt is aware of plan of care. Pt education deemed successful at time of report after patient demonstrates successful teach back for proficiency.

## 2024-03-29 NOTE — ED ADULT NURSE NOTE - OBJECTIVE STATEMENT
patient A+Ox4, recently dcd from [facility], BIBA for abdominal pain. patient is in bed, crying, stating "my body doesn't work patient A+Ox4, recently dcd from [facility] for abdominal pain. patient is in bed, crying, stating "my body doesn't work." patient is complaining of abdominal pain in RUQ, RLQ. patient is tremoring, past hx of alcohol abuse. patient A+Ox4, recently dcd from [facility] for abdominal pain. patient is in bed, crying, stating "my body doesn't work." patient is complaining of abdominal pain in RUQ, RLQ. patient is tremoring, past hx of alcohol abuse, CIWA 11. Patient is poor historian, patient keeps stating, "my body doesn't work, my stomach hurts." Rebound tenderness, noted in all 4 quadrants. USG IV placed, Ativan 2mg IVP administered.

## 2024-03-29 NOTE — ED PROVIDER NOTE - OBJECTIVE STATEMENT
57 y/o M presenting to the ED w/ PMH ETOH abuse, gastritis, multiple ED visits, presenting to the ED w/ intoxication. Per EMS, patient was drinking alcohol earlier today. Patient endorsing abdominal pain. Denies vomiting or diarrhea. No fever or chills. Patient appears intoxicated, hx limited.
The scribe's documentation has been prepared under my direction and personally reviewed by me in its entirety. I confirm that the note above accurately reflects all work, treatment, procedures, and medical decision making performed by me.

## 2024-03-29 NOTE — ED ADULT NURSE NOTE - BIRTH SEX
Oxytocin Safety Progress Check Note - Jen Mendoza 29 y o  female MRN: [id number]    Unit/Bed#: -01 Encounter: [id number]    Obstetric History       T0      L0     SAB0   TAB0   Ectopic0   Multiple0   Live Births0      Gestational Age: 41w1d  Dose (shania-units/min) Oxytocin: 6 shania-units/min  Contraction Frequency (minutes): 2-4  Contraction Quality: Mild  Tachysystole: No   Dilation: 1        Effacement (%): 70  Station: -2  Baseline Rate: 145 bpm  Fetal Heart Rate: 147 BPM  FHR Category: Category I          Notes/comments:   Patient is very uncomfortable and requesting epidural  FHT has been category 1 and reactive  She is candice every 2 minutes    Will arom after she gets epidural   D/W Dr Nancy Alcantara MD 2018 12:15 PM Male

## 2024-03-29 NOTE — ED CLERICAL - NS ED CARE COORDINATION INFORMATION
This patient is eligible for outpatient care navigation through the [facility] readmission reduction initiative. This patient will be engaged by the transitional care management team to enroll into this program. Please call the number above to facilitate this enrollment or for close follow up.

## 2024-03-29 NOTE — ED PROVIDER NOTE - PHYSICAL EXAMINATION
GENERAL: Awake, alert, NAD  HEENT: NC/AT, moist mucous membranes  LUNGS: CTAB, no wheezes or crackles   CARDIAC: tachycardic, regular rhythm, no m/r/g  ABDOMEN: Soft, +mild epigastric tenderness, non distended, no rebound, no guarding  EXT: No edema, no calf tenderness, no deformities.  NEURO: A&Ox3. Moving all extremities.  SKIN: Warm and dry. No rash.  PSYCH: Normal affect.

## 2024-03-29 NOTE — ED PROVIDER NOTE - PROGRESS NOTE DETAILS
Attending Mandy: received sign out pending re-eval. multiple attempts to get pt up and get family to come get pt. pt refused to get up and continued sleeping. Family ultimately contacted, stated they would not be available until later this afternoon to pick him up. pt ultimately was more alert and coherent, CIWA found to be 9. will admit for alcohol withdrawal, endorsed to Dr. Brown

## 2024-03-30 PROCEDURE — 99223 1ST HOSP IP/OBS HIGH 75: CPT

## 2024-03-30 RX ORDER — PHENOBARBITAL 60 MG
64.8 TABLET ORAL EVERY 12 HOURS
Refills: 0 | Status: DISCONTINUED | OUTPATIENT
Start: 2024-03-30 | End: 2024-03-31

## 2024-03-30 RX ORDER — ONDANSETRON 8 MG/1
4 TABLET, FILM COATED ORAL EVERY 8 HOURS
Refills: 0 | Status: DISCONTINUED | OUTPATIENT
Start: 2024-03-30 | End: 2024-04-08

## 2024-03-30 RX ORDER — THIAMINE MONONITRATE (VIT B1) 100 MG
100 TABLET ORAL DAILY
Refills: 0 | Status: DISCONTINUED | OUTPATIENT
Start: 2024-03-30 | End: 2024-04-01

## 2024-03-30 RX ORDER — SODIUM CHLORIDE 9 MG/ML
1000 INJECTION, SOLUTION INTRAVENOUS
Refills: 0 | Status: DISCONTINUED | OUTPATIENT
Start: 2024-03-30 | End: 2024-03-31

## 2024-03-30 RX ORDER — PHENOBARBITAL 60 MG
65 TABLET ORAL
Refills: 0 | Status: DISCONTINUED | OUTPATIENT
Start: 2024-03-30 | End: 2024-03-30

## 2024-03-30 RX ORDER — PANTOPRAZOLE SODIUM 20 MG/1
40 TABLET, DELAYED RELEASE ORAL DAILY
Refills: 0 | Status: DISCONTINUED | OUTPATIENT
Start: 2024-03-30 | End: 2024-03-31

## 2024-03-30 RX ORDER — DIAZEPAM 5 MG
10 TABLET ORAL ONCE
Refills: 0 | Status: DISCONTINUED | OUTPATIENT
Start: 2024-03-30 | End: 2024-03-30

## 2024-03-30 RX ORDER — ACETAMINOPHEN 500 MG
650 TABLET ORAL ONCE
Refills: 0 | Status: COMPLETED | OUTPATIENT
Start: 2024-03-30 | End: 2024-03-30

## 2024-03-30 RX ORDER — PHENOBARBITAL 60 MG
130 TABLET ORAL ONCE
Refills: 0 | Status: DISCONTINUED | OUTPATIENT
Start: 2024-03-30 | End: 2024-03-30

## 2024-03-30 RX ADMIN — Medication 10 MILLIGRAM(S): at 11:28

## 2024-03-30 RX ADMIN — Medication 2 MILLIGRAM(S): at 19:14

## 2024-03-30 RX ADMIN — Medication 2 MILLIGRAM(S): at 23:36

## 2024-03-30 RX ADMIN — Medication 50 MILLIGRAM(S): at 06:57

## 2024-03-30 RX ADMIN — Medication 2 MILLIGRAM(S): at 22:32

## 2024-03-30 RX ADMIN — Medication 64.8 MILLIGRAM(S): at 18:08

## 2024-03-30 RX ADMIN — Medication 50 MILLIGRAM(S): at 16:26

## 2024-03-30 RX ADMIN — PANTOPRAZOLE SODIUM 40 MILLIGRAM(S): 20 TABLET, DELAYED RELEASE ORAL at 16:27

## 2024-03-30 RX ADMIN — Medication 650 MILLIGRAM(S): at 23:36

## 2024-03-30 RX ADMIN — Medication 10 MILLIGRAM(S): at 14:00

## 2024-03-30 RX ADMIN — Medication 130 MILLIGRAM(S): at 17:49

## 2024-03-30 RX ADMIN — Medication 2 MILLIGRAM(S): at 11:53

## 2024-03-30 NOTE — H&P ADULT - HISTORY OF PRESENT ILLNESS
Mr. Degroot is a 57 y/o male from home w/ PMH of ETOH use, gastritis, HLD and multiple admissions for ETOH withdrawal p/w abdominal pain. Patient reports that he has been having significant epigastric abdominal pain, described as burning sensation and is not able to keep anything down. He reports having EGD recently at [address] [place] [address] which happens to be [facility], though no documentation per chart review. His last EGD per charts was 2-3 yrs ago which showed esophageal ulcer. Patient denies any hematemesis coffee  ground emesis or other complaints. He reports drinking "red wine" and black label- whiskey- 1-2 shots every 3-4 days and says that his drink was 4 days ago.   Of note, per chart review it appears that patient drinks daily vodka and whiskey, he was well known to ED provider and RN. He has had multiple admissions at  hospital for ETOH withdrawal requiring phenobarb.     In ED, patient's VS were stable. His CIWA was above 9, was given librium 50mg, valium 10mg x2 and ativan 4mg.

## 2024-03-30 NOTE — PATIENT PROFILE ADULT - PATIENT'S SEXUAL ORIENTATION
RE 1 [facility]     [employer]IA [employer]MM (Key: [id number])    EFFECTIVE CaseId:[id number];Status:Approved; Review Type:Prior Auth; Coverage Start Date:01/06/2021; Coverage End Date:01/06/2022; Heterosexual

## 2024-03-30 NOTE — PATIENT PROFILE ADULT - FALL HARM RISK - HARM RISK INTERVENTIONS

## 2024-03-30 NOTE — PATIENT PROFILE ADULT - LEGAL HELP
Last office visit: 8/30/21    Next office visit: Not scheduled (wellness exam due 8/2022)    Last refill: 1/18/21    
no

## 2024-03-30 NOTE — ED ADULT NURSE REASSESSMENT NOTE - NS ED NURSE REASSESS COMMENT FT1
3xRn attempt for IV access MD Galvan made aware . received patient with RIGHT 20G not flushing at this time

## 2024-03-30 NOTE — H&P ADULT - ASSESSMENT
Mr. Degroot is a 57 y/o male from home w/ PMH of ETOH use, gastritis, HLD and multiple admissions for ETOH withdrawal p/w abdominal pain. Admitted for ETOH induced gastritis and ETOH withdrawal syndrome     A/P:  #ETOH withdrawal syndrome   #Gastritis   #HLD  #Leukopenia   #DVT ppx     Plan:  -Patient p/w abd pain, nausea/ vomiting- lipase mildly elevated. Symptoms likely gastritis related, start PPI, maalox prn. If symptoms persist, can consider carafate. Tolerating diet in ED, will start on Regular diet.   -Pt noted w/ tremors, CIWA >9, reports last drink was 4 days ago, though BAL- 233 from last evening.   -Now w/ s/s of etoh withdrawal- start CIWA protocol, start phenobarb given prior somewhat poor response to Bz and need of phenobarb in last 2 admissions.   -multivitamin, folate and thiamine   -Leukopenia is likely 2/2 BM suppression from etoh use   -Resume statin   -Lovenox SC

## 2024-03-31 PROCEDURE — 99291 CRITICAL CARE FIRST HOUR: CPT

## 2024-03-31 PROCEDURE — G0508: CPT

## 2024-03-31 PROCEDURE — 99232 SBSQ HOSP IP/OBS MODERATE 35: CPT

## 2024-03-31 RX ORDER — PANTOPRAZOLE SODIUM 20 MG/1
40 TABLET, DELAYED RELEASE ORAL
Refills: 0 | Status: DISCONTINUED | OUTPATIENT
Start: 2024-04-01 | End: 2024-04-04

## 2024-03-31 RX ORDER — PHENOBARBITAL 60 MG
390 TABLET ORAL
Refills: 0 | Status: DISCONTINUED | OUTPATIENT
Start: 2024-03-31 | End: 2024-04-01

## 2024-03-31 RX ORDER — FOLIC ACID 0.8 MG
1 TABLET ORAL DAILY
Refills: 0 | Status: DISCONTINUED | OUTPATIENT
Start: 2024-03-31 | End: 2024-04-08

## 2024-03-31 RX ORDER — CHLORHEXIDINE GLUCONATE 213 G/1000ML
1 SOLUTION TOPICAL
Refills: 0 | Status: DISCONTINUED | OUTPATIENT
Start: 2024-03-31 | End: 2024-04-08

## 2024-03-31 RX ORDER — PHENOBARBITAL 60 MG
130 TABLET ORAL
Refills: 0 | Status: DISCONTINUED | OUTPATIENT
Start: 2024-03-31 | End: 2024-04-02

## 2024-03-31 RX ORDER — ACETAMINOPHEN 500 MG
1000 TABLET ORAL ONCE
Refills: 0 | Status: COMPLETED | OUTPATIENT
Start: 2024-03-31 | End: 2024-03-31

## 2024-03-31 RX ORDER — ENOXAPARIN SODIUM 100 MG/ML
40 INJECTION SUBCUTANEOUS EVERY 24 HOURS
Refills: 0 | Status: DISCONTINUED | OUTPATIENT
Start: 2024-03-31 | End: 2024-04-08

## 2024-03-31 RX ORDER — SENNA PLUS 8.6 MG/1
2 TABLET ORAL AT BEDTIME
Refills: 0 | Status: DISCONTINUED | OUTPATIENT
Start: 2024-03-31 | End: 2024-04-08

## 2024-03-31 RX ORDER — SODIUM CHLORIDE 9 MG/ML
1000 INJECTION, SOLUTION INTRAVENOUS
Refills: 0 | Status: DISCONTINUED | OUTPATIENT
Start: 2024-03-31 | End: 2024-04-07

## 2024-03-31 RX ORDER — POLYETHYLENE GLYCOL 3350 17 G/17G
17 POWDER, FOR SOLUTION ORAL DAILY
Refills: 0 | Status: DISCONTINUED | OUTPATIENT
Start: 2024-03-31 | End: 2024-04-08

## 2024-03-31 RX ORDER — PHENOBARBITAL 60 MG
520 TABLET ORAL ONCE
Refills: 0 | Status: DISCONTINUED | OUTPATIENT
Start: 2024-03-31 | End: 2024-03-31

## 2024-03-31 RX ADMIN — CHLORHEXIDINE GLUCONATE 1 APPLICATION(S): 213 SOLUTION TOPICAL at 18:45

## 2024-03-31 RX ADMIN — ENOXAPARIN SODIUM 40 MILLIGRAM(S): 100 INJECTION SUBCUTANEOUS at 20:15

## 2024-03-31 RX ADMIN — PANTOPRAZOLE SODIUM 40 MILLIGRAM(S): 20 TABLET, DELAYED RELEASE ORAL at 11:10

## 2024-03-31 RX ADMIN — Medication 2 MILLIGRAM(S): at 12:41

## 2024-03-31 RX ADMIN — Medication 650 MILLIGRAM(S): at 00:47

## 2024-03-31 RX ADMIN — Medication 2 MILLIGRAM(S): at 11:10

## 2024-03-31 RX ADMIN — Medication 424 MILLIGRAM(S): at 22:32

## 2024-03-31 RX ADMIN — POLYETHYLENE GLYCOL 3350 17 GRAM(S): 17 POWDER, FOR SOLUTION ORAL at 20:16

## 2024-03-31 RX ADMIN — Medication 2 MILLIGRAM(S): at 01:06

## 2024-03-31 RX ADMIN — Medication 64.8 MILLIGRAM(S): at 05:36

## 2024-03-31 RX ADMIN — Medication 1000 MILLIGRAM(S): at 21:30

## 2024-03-31 RX ADMIN — Medication 400 MILLIGRAM(S): at 21:18

## 2024-03-31 RX ADMIN — Medication 2 MILLIGRAM(S): at 18:12

## 2024-03-31 RX ADMIN — SODIUM CHLORIDE 75 MILLILITER(S): 9 INJECTION, SOLUTION INTRAVENOUS at 20:30

## 2024-03-31 RX ADMIN — Medication 2 MILLIGRAM(S): at 16:07

## 2024-03-31 RX ADMIN — SENNA PLUS 2 TABLET(S): 8.6 TABLET ORAL at 22:33

## 2024-03-31 RX ADMIN — Medication 2 MILLIGRAM(S): at 02:24

## 2024-03-31 RX ADMIN — Medication 130 MILLIGRAM(S): at 22:47

## 2024-03-31 RX ADMIN — SODIUM CHLORIDE 100 MILLILITER(S): 9 INJECTION, SOLUTION INTRAVENOUS at 05:46

## 2024-03-31 RX ADMIN — Medication 432 MILLIGRAM(S): at 19:40

## 2024-03-31 RX ADMIN — Medication 1 TABLET(S): at 11:10

## 2024-03-31 RX ADMIN — SODIUM CHLORIDE 100 MILLILITER(S): 9 INJECTION, SOLUTION INTRAVENOUS at 12:41

## 2024-03-31 RX ADMIN — Medication 1 MILLIGRAM(S): at 22:34

## 2024-03-31 RX ADMIN — Medication 100 MILLIGRAM(S): at 11:11

## 2024-03-31 RX ADMIN — Medication 64.8 MILLIGRAM(S): at 17:05

## 2024-03-31 RX ADMIN — Medication 2 MILLIGRAM(S): at 08:29

## 2024-03-31 NOTE — PHARMACOTHERAPY INTERVENTION NOTE - COMMENTS
Per policy, pantoprazole 40 mg IVP daily transitioned to oral formulation since the patient is tolerating other medications enterally.  
Phenobarb load is recommended in patient who have NOTreceived benzos in 8-12 hours which this patient has. Also, load is recommended to be 10mg/kg; spoke to Mario Alberto OVIEDO) he's aware of the loading dose requirements as well as 10mg/kg. However, patient is known to the team. They want to give ~16mg/kg loading dose in 3 parts in addition to PRN phenobarbital. Recommended to order phenobarbital levels
Phenobarb load is recommended in patient who have NOT received benzos in 8-12 hours which this patient has. Also, load is recommended to be 10mg/kg; spoke to Mario Alberto OVIEDO) he's aware of the loading dose requirements as well as 10mg/kg. However, patient is known to the team. They want to give ~16mg/kg loading dose in 3 parts in addition to PRN phenobarbital. Recommended to order phenobarbital levels

## 2024-03-31 NOTE — PROVIDER CONTACT NOTE (EICU) - BACKGROUND
57 y/o male from home w/ PMH of ETOH use, gastritis, HLD and multiple admissions for ETOH withdrawal and Dts p/w abdominal pain to ED. 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 Again, requiring ICU admission for benzo refractive DTs

## 2024-03-31 NOTE — PROVIDER CONTACT NOTE (EICU) - ASSESSMENT
Phenobarb protocol for DT's  Thiamine/folate/MVI  IVF w/ dextrose  NPO for now while in withdrawal  Admit to ICU.  Extremely well known ICU Patient.

## 2024-03-31 NOTE — PROGRESS NOTE ADULT - ASSESSMENT
Mr. Degroot is a 57 y/o male from home w/ PMH of ETOH use, gastritis, HLD and multiple admissions for ETOH withdrawal p/w abdominal pain. Admitted for ETOH induced gastritis and ETOH withdrawal syndrome     A/P:  #ETOH withdrawal syndrome   #Gastritis   #HLD  #Leukopenia   #DVT ppx     Plan:  -Patient p/w abd pain, nausea/ vomiting- lipase mildly elevated. Symptoms likely gastritis related,   Continue PPI, maalox prn. If symptoms persist, can consider carafate. Tolerating diet  -Reports last drink was 4 days ago, though BAL- 233 from last evening.   -Now w/ s/s of etoh withdrawal- started CIWA protocol, started phenobarb given prior poor response to Bz and need of phenobarb in last 2 admissions.  - Monitor for DT   -multivitamin, folate and thiamine   -Leukopenia is likely 2/2 BM suppression from etoh use   -Resume statin   -Lovenox SC

## 2024-03-31 NOTE — CONSULT NOTE ADULT - SUBJECTIVE AND OBJECTIVE BOX
CHIEF COMPLAINT: etoh intox    HPI:  59 y/o male from home w/ PMH of ETOH use, gastritis, HLD and multiple admissions for ETOH withdrawal and Dts p/w abdominal pain to ED. 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 Pt was intoxicated with etoh level of 433 in ED and admitted to medicine with CIWA for etoh intox and abd pain from suspected etoh induced pancreatitis. In last 24hrs pt has required 20mg ativan with ativan 2mg q1hr for withdrawal sx. ICU consulted for DTs.     Subjective: Pt seen and examined at the bedside. Pt tremulous, agitated but somewhat redirectable, asking to leave to go home but intermittently not speaking coherently.    Pt accepted to ICU for further DTs management.      PAST MEDICAL & SURGICAL HISTORY:  PUD (peptic ulcer disease)      EtOH dependence      Gastritis      Elevated lipase      No significant past surgical history          FAMILY HISTORY:  FH: HTN (hypertension)        SOCIAL HISTORY:  Smoking: denies previously   EtOH Use: drinks daily very large amount       Allergies    No Known Allergies    Intolerances        HOME MEDICATIONS:    REVIEW OF SYSTEMS:  Constitutional:   Eyes:  ENT:  CV:  Resp:  GI:  :  MSK:  Integumentary:  Neurological:  Psychiatric:  Endocrine:  Hematologic/Lymphatic:  Allergic/Immunologic:  [ ] All other systems negative  [x ] Unable to assess ROS because Dts    OBJECTIVE:  ICU Vital Signs Last 24 Hrs  T(C): 36.7 (31 Mar 2024 17:13), Max: 37.3 (30 Mar 2024 19:10)  T(F): 98.1 (31 Mar 2024 17:13), Max: 99.1 (30 Mar 2024 19:10)  HR: 66 (31 Mar 2024 17:13) (66 - 105)  BP: 157/89 (31 Mar 2024 17:13) (114/72 - 157/89)  BP(mean): --  ABP: --  ABP(mean): --  RR: 18 (31 Mar 2024 17:13) (15 - 20)  SpO2: 99% (31 Mar 2024 17:13) (97% - 100%)    O2 Parameters below as of 31 Mar 2024 17:13  Patient On (Oxygen Delivery Method): room air              03-30 @ 07:01  -  03-31 @ 07:00  --------------------------------------------------------  IN: 716 mL / OUT: 0 mL / NET: 716 mL    03-31 @ 07:01  -  03-31 @ 18:31  --------------------------------------------------------  IN: 500 mL / OUT: 1400 mL / NET: -900 mL      CAPILLARY BLOOD GLUCOSE          PHYSICAL EXAM:  GENERAL: agitated, tremulous   HEAD:  Atraumatic, normocephalic  EYES: EOMI, PERRL  NECK: Supple, trachea midline  HEART: Regular rate and rhythm  LUNGS: Unlabored respirations.  Clear to auscultation bilaterally, no crackles, wheezing, or rhonchi  ABDOMEN: Soft, nontender, nondistended, +BS  EXTREMITIES: 2+ peripheral pulses bilaterally. No LE edema   NERVOUS SYSTEM:  A&Ox1, BENDER, confused, follows some commands        HOSPITAL MEDICATIONS:  MEDICATIONS  (STANDING):  lactated ringers. 1000 milliLiter(s) (100 mL/Hr) IV Continuous <Continuous>  multivitamin 1 Tablet(s) Oral daily  PHENobarbital IVPB 520 milliGRAM(s) IV Intermittent once  PHENobarbital IVPB 390 milliGRAM(s) IV Intermittent every 3 hours  thiamine 100 milliGRAM(s) Oral daily    MEDICATIONS  (PRN):  aluminum hydroxide/magnesium hydroxide/simethicone Suspension 30 milliLiter(s) Oral every 4 hours PRN Dyspepsia  ondansetron Injectable 4 milliGRAM(s) IV Push every 8 hours PRN Nausea and/or Vomiting  PHENobarbital Injectable 130 milliGRAM(s) IV Push every 1 hour PRN agitation/breakthrough withdrawal sx      LABS:                        13.7   3.06  )-----------( 155      ( 29 Mar 2024 18:47 )             42.8     03-29    141  |  107  |  14  ----------------------------<  114<H>  4.1   |  24  |  1.29    Ca    8.9      29 Mar 2024 18:47    TPro  9.3<H>  /  Alb  4.0  /  TBili  0.6  /  DBili  x   /  AST  61<H>  /  ALT  39  /  AlkPhos  60  03-29      Urinalysis Basic - ( 29 Mar 2024 18:47 )    Color: x / Appearance: x / SG: x / pH: x  Gluc: 114 mg/dL / Ketone: x  / Bili: x / Urobili: x   Blood: x / Protein: x / Nitrite: x   Leuk Esterase: x / RBC: x / WBC x   Sq Epi: x / Non Sq Epi: x / Bacteria: x            MICROBIOLOGY: reviewed     RADIOLOGY:  [x ] Reviewed and interpreted by me    EKG: reviewed

## 2024-04-01 LAB
ALBUMIN SERPL ELPH-MCNC: 3.2 G/DL — LOW (ref 3.3–5)
ALP SERPL-CCNC: 59 U/L — SIGNIFICANT CHANGE UP (ref 40–120)
ALT FLD-CCNC: 34 U/L — SIGNIFICANT CHANGE UP (ref 12–78)
ANION GAP SERPL CALC-SCNC: 5 MMOL/L — SIGNIFICANT CHANGE UP (ref 5–17)
AST SERPL-CCNC: 66 U/L — HIGH (ref 15–37)
BILIRUB SERPL-MCNC: 1.2 MG/DL — SIGNIFICANT CHANGE UP (ref 0.2–1.2)
BUN SERPL-MCNC: 4 MG/DL — LOW (ref 7–23)
CALCIUM SERPL-MCNC: 8.7 MG/DL — SIGNIFICANT CHANGE UP (ref 8.5–10.1)
CHLORIDE SERPL-SCNC: 109 MMOL/L — HIGH (ref 96–108)
CO2 SERPL-SCNC: 22 MMOL/L — SIGNIFICANT CHANGE UP (ref 22–31)
CREAT SERPL-MCNC: 0.95 MG/DL — SIGNIFICANT CHANGE UP (ref 0.5–1.3)
EGFR: 94 ML/MIN/1.73M2 — SIGNIFICANT CHANGE UP
GLUCOSE BLDC GLUCOMTR-MCNC: 88 MG/DL — SIGNIFICANT CHANGE UP (ref 70–99)
GLUCOSE SERPL-MCNC: 111 MG/DL — HIGH (ref 70–99)
HCT VFR BLD CALC: 34.4 % — LOW (ref 39–50)
HGB BLD-MCNC: 11.2 G/DL — LOW (ref 13–17)
LIDOCAIN IGE QN: 91 U/L — HIGH (ref 13–75)
MAGNESIUM SERPL-MCNC: 1.6 MG/DL — SIGNIFICANT CHANGE UP (ref 1.6–2.6)
MCHC RBC-ENTMCNC: 24.3 PG — LOW (ref 27–34)
MCHC RBC-ENTMCNC: 32.6 G/DL — SIGNIFICANT CHANGE UP (ref 32–36)
MCV RBC AUTO: 74.6 FL — LOW (ref 80–100)
NRBC # BLD: 0 /100 WBCS — SIGNIFICANT CHANGE UP (ref 0–0)
PHOSPHATE SERPL-MCNC: 2.5 MG/DL — SIGNIFICANT CHANGE UP (ref 2.5–4.5)
PLATELET # BLD AUTO: 103 K/UL — LOW (ref 150–400)
POTASSIUM SERPL-MCNC: 3.9 MMOL/L — SIGNIFICANT CHANGE UP (ref 3.5–5.3)
POTASSIUM SERPL-SCNC: 3.9 MMOL/L — SIGNIFICANT CHANGE UP (ref 3.5–5.3)
PROT SERPL-MCNC: 7.3 GM/DL — SIGNIFICANT CHANGE UP (ref 6–8.3)
RBC # BLD: 4.61 M/UL — SIGNIFICANT CHANGE UP (ref 4.2–5.8)
RBC # FLD: 20.5 % — HIGH (ref 10.3–14.5)
SODIUM SERPL-SCNC: 136 MMOL/L — SIGNIFICANT CHANGE UP (ref 135–145)
WBC # BLD: 3.56 K/UL — LOW (ref 3.8–10.5)
WBC # FLD AUTO: 3.56 K/UL — LOW (ref 3.8–10.5)

## 2024-04-01 PROCEDURE — 99291 CRITICAL CARE FIRST HOUR: CPT | Mod: GC

## 2024-04-01 RX ORDER — HALOPERIDOL DECANOATE 100 MG/ML
5 INJECTION INTRAMUSCULAR ONCE
Refills: 0 | Status: COMPLETED | OUTPATIENT
Start: 2024-04-01 | End: 2024-04-01

## 2024-04-01 RX ORDER — PHENOBARBITAL 60 MG
260 TABLET ORAL ONCE
Refills: 0 | Status: DISCONTINUED | OUTPATIENT
Start: 2024-04-01 | End: 2024-04-01

## 2024-04-01 RX ORDER — PHENOBARBITAL 60 MG
130 TABLET ORAL ONCE
Refills: 0 | Status: DISCONTINUED | OUTPATIENT
Start: 2024-04-01 | End: 2024-04-01

## 2024-04-01 RX ORDER — MAGNESIUM SULFATE 500 MG/ML
2 VIAL (ML) INJECTION ONCE
Refills: 0 | Status: COMPLETED | OUTPATIENT
Start: 2024-04-01 | End: 2024-04-01

## 2024-04-01 RX ORDER — THIAMINE MONONITRATE (VIT B1) 100 MG
500 TABLET ORAL EVERY 8 HOURS
Refills: 0 | Status: COMPLETED | OUTPATIENT
Start: 2024-04-01 | End: 2024-04-04

## 2024-04-01 RX ORDER — DEXMEDETOMIDINE HYDROCHLORIDE IN 0.9% SODIUM CHLORIDE 4 UG/ML
1.4 INJECTION INTRAVENOUS
Qty: 200 | Refills: 0 | Status: DISCONTINUED | OUTPATIENT
Start: 2024-04-01 | End: 2024-04-02

## 2024-04-01 RX ADMIN — SODIUM CHLORIDE 75 MILLILITER(S): 9 INJECTION, SOLUTION INTRAVENOUS at 19:39

## 2024-04-01 RX ADMIN — SODIUM CHLORIDE 75 MILLILITER(S): 9 INJECTION, SOLUTION INTRAVENOUS at 11:17

## 2024-04-01 RX ADMIN — PANTOPRAZOLE SODIUM 40 MILLIGRAM(S): 20 TABLET, DELAYED RELEASE ORAL at 06:11

## 2024-04-01 RX ADMIN — Medication 25 GRAM(S): at 06:10

## 2024-04-01 RX ADMIN — Medication 105 MILLIGRAM(S): at 22:03

## 2024-04-01 RX ADMIN — Medication 424 MILLIGRAM(S): at 00:47

## 2024-04-01 RX ADMIN — DEXMEDETOMIDINE HYDROCHLORIDE IN 0.9% SODIUM CHLORIDE 27.7 MICROGRAM(S)/KG/HR: 4 INJECTION INTRAVENOUS at 03:45

## 2024-04-01 RX ADMIN — DEXMEDETOMIDINE HYDROCHLORIDE IN 0.9% SODIUM CHLORIDE 27.7 MICROGRAM(S)/KG/HR: 4 INJECTION INTRAVENOUS at 06:10

## 2024-04-01 RX ADMIN — SENNA PLUS 2 TABLET(S): 8.6 TABLET ORAL at 22:03

## 2024-04-01 RX ADMIN — HALOPERIDOL DECANOATE 5 MILLIGRAM(S): 100 INJECTION INTRAMUSCULAR at 22:03

## 2024-04-01 RX ADMIN — Medication 130 MILLIGRAM(S): at 01:46

## 2024-04-01 RX ADMIN — Medication 260 MILLIGRAM(S): at 16:24

## 2024-04-01 RX ADMIN — Medication 260 MILLIGRAM(S): at 17:44

## 2024-04-01 RX ADMIN — DEXMEDETOMIDINE HYDROCHLORIDE IN 0.9% SODIUM CHLORIDE 27.7 MICROGRAM(S)/KG/HR: 4 INJECTION INTRAVENOUS at 08:40

## 2024-04-01 RX ADMIN — Medication 130 MILLIGRAM(S): at 21:10

## 2024-04-01 RX ADMIN — DEXMEDETOMIDINE HYDROCHLORIDE IN 0.9% SODIUM CHLORIDE 27.7 MICROGRAM(S)/KG/HR: 4 INJECTION INTRAVENOUS at 23:05

## 2024-04-01 RX ADMIN — DEXMEDETOMIDINE HYDROCHLORIDE IN 0.9% SODIUM CHLORIDE 27.7 MICROGRAM(S)/KG/HR: 4 INJECTION INTRAVENOUS at 01:45

## 2024-04-01 RX ADMIN — Medication 105 MILLIGRAM(S): at 14:39

## 2024-04-01 RX ADMIN — CHLORHEXIDINE GLUCONATE 1 APPLICATION(S): 213 SOLUTION TOPICAL at 12:11

## 2024-04-01 RX ADMIN — ENOXAPARIN SODIUM 40 MILLIGRAM(S): 100 INJECTION SUBCUTANEOUS at 20:52

## 2024-04-01 RX ADMIN — DEXMEDETOMIDINE HYDROCHLORIDE IN 0.9% SODIUM CHLORIDE 27.7 MICROGRAM(S)/KG/HR: 4 INJECTION INTRAVENOUS at 21:38

## 2024-04-01 RX ADMIN — Medication 130 MILLIGRAM(S): at 00:04

## 2024-04-01 RX ADMIN — Medication 130 MILLIGRAM(S): at 17:15

## 2024-04-01 RX ADMIN — Medication 130 MILLIGRAM(S): at 13:49

## 2024-04-01 RX ADMIN — DEXMEDETOMIDINE HYDROCHLORIDE IN 0.9% SODIUM CHLORIDE 27.7 MICROGRAM(S)/KG/HR: 4 INJECTION INTRAVENOUS at 03:41

## 2024-04-01 NOTE — PROGRESS NOTE ADULT - ASSESSMENT
59 y/o male from home w/ PMH of ETOH use, gastritis, HLD and multiple admissions for ETOH withdrawal and Dts p/w withdrawal.  Neuro: S/p phenobarbital load. Will continue precedex as needed. Start phenobarb taper tomorrow. Monitor LFTs. Thiamine high dose for three days. Folic acid. MV as patient tolerates.  CV: MICHELLE  Respiratory: MICHELLE  GI: NPO for now  Renal: MICHELLE. Monitor UOP.   ENdocrine: FS q6hrs while NPO  Heme: Lovenox for DVT ppx  Ethics: Full code      Pt requires ICU level care for concern for neurological deterioration.  59 y/o male from home w/ PMH of ETOH use, gastritis, HLD and multiple admissions for ETOH withdrawal and Dts p/w withdrawal.  Neuro: S/p phenobarbital load. Will continue precedex as needed. Start phenobarb taper tomorrow. Monitor LFTs. Thiamine high dose for three days. Folic acid. MV as patient tolerates.  CV: MICHELLE  Respiratory: MICHELLE  GI: NPO for now  Renal: MICHELLE. Monitor UOP.   ENdocrine: FS q6hrs while NPO  Heme: Lovenox for DVT ppx. Thombocytopenia, likely in setting of  liver dysfunction. CTM.  Ethics: Full code      Pt requires ICU level care for concern for neurological deterioration.

## 2024-04-01 NOTE — PROGRESS NOTE ADULT - ASSESSMENT
Problem List:  1) severe etoh withdrawal with DTs  2) hypomagnesemia     total cumulative dose of phenobarb is 2,080mg  Also got multiple benzo doses   Will continue to use phenobarb as needed to control agitation, he has gotten 26mg/kg of phenobarb so far which is on the high end. Spoke to E-ICU attending Dr. Perez who has personally delt with this patient in the past, he stated to continue to give phenobarb as he can tolerate it. he has gotten 5-6g in total before  suspect there is a component of NARD as well will utilize precedex infusion and haldol PRN to control delirium symptoms   replace electrolytes aggressively  dextrose containing IVF and high dose thiamine   DVT-P with lovenox

## 2024-04-02 LAB
ALBUMIN SERPL ELPH-MCNC: 3.2 G/DL — LOW (ref 3.3–5)
ALP SERPL-CCNC: 59 U/L — SIGNIFICANT CHANGE UP (ref 40–120)
ALT FLD-CCNC: 30 U/L — SIGNIFICANT CHANGE UP (ref 12–78)
ANION GAP SERPL CALC-SCNC: 8 MMOL/L — SIGNIFICANT CHANGE UP (ref 5–17)
AST SERPL-CCNC: 48 U/L — HIGH (ref 15–37)
BASOPHILS # BLD AUTO: 0.02 K/UL — SIGNIFICANT CHANGE UP (ref 0–0.2)
BASOPHILS NFR BLD AUTO: 0.7 % — SIGNIFICANT CHANGE UP (ref 0–2)
BILIRUB SERPL-MCNC: 0.6 MG/DL — SIGNIFICANT CHANGE UP (ref 0.2–1.2)
BUN SERPL-MCNC: 2 MG/DL — LOW (ref 7–23)
CALCIUM SERPL-MCNC: 8.7 MG/DL — SIGNIFICANT CHANGE UP (ref 8.5–10.1)
CHLORIDE SERPL-SCNC: 112 MMOL/L — HIGH (ref 96–108)
CO2 SERPL-SCNC: 21 MMOL/L — LOW (ref 22–31)
CREAT SERPL-MCNC: 0.84 MG/DL — SIGNIFICANT CHANGE UP (ref 0.5–1.3)
EGFR: 102 ML/MIN/1.73M2 — SIGNIFICANT CHANGE UP
EOSINOPHIL # BLD AUTO: 0.23 K/UL — SIGNIFICANT CHANGE UP (ref 0–0.5)
EOSINOPHIL NFR BLD AUTO: 8 % — HIGH (ref 0–6)
GLUCOSE BLDC GLUCOMTR-MCNC: 137 MG/DL — HIGH (ref 70–99)
GLUCOSE BLDC GLUCOMTR-MCNC: 99 MG/DL — SIGNIFICANT CHANGE UP (ref 70–99)
GLUCOSE SERPL-MCNC: 112 MG/DL — HIGH (ref 70–99)
HCT VFR BLD CALC: 37.5 % — LOW (ref 39–50)
HGB BLD-MCNC: 11.9 G/DL — LOW (ref 13–17)
IMM GRANULOCYTES NFR BLD AUTO: 0 % — SIGNIFICANT CHANGE UP (ref 0–0.9)
LYMPHOCYTES # BLD AUTO: 1.35 K/UL — SIGNIFICANT CHANGE UP (ref 1–3.3)
LYMPHOCYTES # BLD AUTO: 46.9 % — HIGH (ref 13–44)
MAGNESIUM SERPL-MCNC: 1.9 MG/DL — SIGNIFICANT CHANGE UP (ref 1.6–2.6)
MCHC RBC-ENTMCNC: 24 PG — LOW (ref 27–34)
MCHC RBC-ENTMCNC: 31.7 G/DL — LOW (ref 32–36)
MCV RBC AUTO: 75.8 FL — LOW (ref 80–100)
MONOCYTES # BLD AUTO: 0.35 K/UL — SIGNIFICANT CHANGE UP (ref 0–0.9)
MONOCYTES NFR BLD AUTO: 12.2 % — SIGNIFICANT CHANGE UP (ref 2–14)
NEUTROPHILS # BLD AUTO: 0.93 K/UL — LOW (ref 1.8–7.4)
NEUTROPHILS NFR BLD AUTO: 32.2 % — LOW (ref 43–77)
NRBC # BLD: 0 /100 WBCS — SIGNIFICANT CHANGE UP (ref 0–0)
PHOSPHATE SERPL-MCNC: 2.5 MG/DL — SIGNIFICANT CHANGE UP (ref 2.5–4.5)
PLATELET # BLD AUTO: 116 K/UL — LOW (ref 150–400)
POTASSIUM SERPL-MCNC: 3.6 MMOL/L — SIGNIFICANT CHANGE UP (ref 3.5–5.3)
POTASSIUM SERPL-SCNC: 3.6 MMOL/L — SIGNIFICANT CHANGE UP (ref 3.5–5.3)
PROT SERPL-MCNC: 7.4 GM/DL — SIGNIFICANT CHANGE UP (ref 6–8.3)
RBC # BLD: 4.95 M/UL — SIGNIFICANT CHANGE UP (ref 4.2–5.8)
RBC # FLD: 21.2 % — HIGH (ref 10.3–14.5)
SODIUM SERPL-SCNC: 141 MMOL/L — SIGNIFICANT CHANGE UP (ref 135–145)
WBC # BLD: 2.88 K/UL — LOW (ref 3.8–10.5)
WBC # FLD AUTO: 2.88 K/UL — LOW (ref 3.8–10.5)

## 2024-04-02 PROCEDURE — 99291 CRITICAL CARE FIRST HOUR: CPT

## 2024-04-02 PROCEDURE — 71045 X-RAY EXAM CHEST 1 VIEW: CPT | Mod: 26

## 2024-04-02 RX ORDER — ACETAMINOPHEN 500 MG
1000 TABLET ORAL ONCE
Refills: 0 | Status: COMPLETED | OUTPATIENT
Start: 2024-04-02 | End: 2024-04-02

## 2024-04-02 RX ORDER — IPRATROPIUM/ALBUTEROL SULFATE 18-103MCG
3 AEROSOL WITH ADAPTER (GRAM) INHALATION EVERY 6 HOURS
Refills: 0 | Status: DISCONTINUED | OUTPATIENT
Start: 2024-04-02 | End: 2024-04-05

## 2024-04-02 RX ORDER — SODIUM CHLORIDE 9 MG/ML
4 INJECTION INTRAMUSCULAR; INTRAVENOUS; SUBCUTANEOUS EVERY 6 HOURS
Refills: 0 | Status: DISCONTINUED | OUTPATIENT
Start: 2024-04-02 | End: 2024-04-05

## 2024-04-02 RX ORDER — SODIUM CHLORIDE 9 MG/ML
1000 INJECTION, SOLUTION INTRAVENOUS ONCE
Refills: 0 | Status: COMPLETED | OUTPATIENT
Start: 2024-04-02 | End: 2024-04-02

## 2024-04-02 RX ADMIN — CHLORHEXIDINE GLUCONATE 1 APPLICATION(S): 213 SOLUTION TOPICAL at 05:56

## 2024-04-02 RX ADMIN — ENOXAPARIN SODIUM 40 MILLIGRAM(S): 100 INJECTION SUBCUTANEOUS at 21:35

## 2024-04-02 RX ADMIN — SENNA PLUS 2 TABLET(S): 8.6 TABLET ORAL at 21:35

## 2024-04-02 RX ADMIN — Medication 1000 MILLIGRAM(S): at 23:17

## 2024-04-02 RX ADMIN — Medication 105 MILLIGRAM(S): at 21:36

## 2024-04-02 RX ADMIN — Medication 130 MILLIGRAM(S): at 14:55

## 2024-04-02 RX ADMIN — Medication 130 MILLIGRAM(S): at 17:28

## 2024-04-02 RX ADMIN — DEXMEDETOMIDINE HYDROCHLORIDE IN 0.9% SODIUM CHLORIDE 27.7 MICROGRAM(S)/KG/HR: 4 INJECTION INTRAVENOUS at 03:04

## 2024-04-02 RX ADMIN — POLYETHYLENE GLYCOL 3350 17 GRAM(S): 17 POWDER, FOR SOLUTION ORAL at 12:34

## 2024-04-02 RX ADMIN — SODIUM CHLORIDE 75 MILLILITER(S): 9 INJECTION, SOLUTION INTRAVENOUS at 19:54

## 2024-04-02 RX ADMIN — SODIUM CHLORIDE 1000 MILLILITER(S): 9 INJECTION, SOLUTION INTRAVENOUS at 06:17

## 2024-04-02 RX ADMIN — Medication 1 MILLIGRAM(S): at 12:33

## 2024-04-02 RX ADMIN — SODIUM CHLORIDE 75 MILLILITER(S): 9 INJECTION, SOLUTION INTRAVENOUS at 02:36

## 2024-04-02 RX ADMIN — Medication 130 MILLIGRAM(S): at 16:17

## 2024-04-02 RX ADMIN — Medication 130 MILLIGRAM(S): at 08:05

## 2024-04-02 RX ADMIN — Medication 400 MILLIGRAM(S): at 22:25

## 2024-04-02 RX ADMIN — Medication 105 MILLIGRAM(S): at 14:53

## 2024-04-02 RX ADMIN — DEXMEDETOMIDINE HYDROCHLORIDE IN 0.9% SODIUM CHLORIDE 27.7 MICROGRAM(S)/KG/HR: 4 INJECTION INTRAVENOUS at 00:52

## 2024-04-02 RX ADMIN — Medication 105 MILLIGRAM(S): at 05:55

## 2024-04-02 RX ADMIN — Medication 1 TABLET(S): at 12:33

## 2024-04-02 NOTE — DIETITIAN INITIAL EVALUATION ADULT - PERTINENT LABORATORY DATA
04-02    141  |  112<H>  |  2<L>  ----------------------------<  112<H>  3.6   |  21<L>  |  0.84    Ca    8.7      02 Apr 2024 04:30  Phos  2.5     04-02  Mg     1.9     04-02    TPro  7.4  /  Alb  3.2<L>  /  TBili  0.6  /  DBili  x   /  AST  48<H>  /  ALT  30  /  AlkPhos  59  04-02  POCT Blood Glucose.: 137 mg/dL (04-02-24)  A1C Result: 5.3 % (07-18-23)

## 2024-04-02 NOTE — DIETITIAN INITIAL EVALUATION ADULT - OTHER INFO
Unable to interview pt due to severe ETOH withdrawal; pt remains sedated; requiring high doses of Phenobarbital.  Per review of medical record & this clinician's familiarity c pt he is c multiple admissions for alcohol withdrawal c DTs; pt c/o N/V & not being able to keep food/fluids down PTA; kept in CCU for neurological deterioration. Epigastric pain most likely due to alcohol induced pancreatitis.  Per discussion c RN, request soft foods for ease of eating & nutritional supplement. Wt has been stable per review of previous RD assessments.

## 2024-04-02 NOTE — PROGRESS NOTE ADULT - ASSESSMENT
59 y/o male from home w/ PMH of ETOH use, gastritis, HLD and multiple admissions for ETOH withdrawal and Dts p/w withdrawal.  Neuro: S/p phenobarbital load. Requiring further phenobarb prns. Monitor LFTs. Thiamine high dose for three days. Folic acid. MV as patient tolerates.  CV: MICHELLE  Respiratory: MICHELLE  GI: NPO for now  Renal: MICHELLE. Monitor UOP.   ENdocrine: FS q6hrs while NPO  Heme: Lovenox for DVT ppx. Thombocytopenia, likely in setting of  liver dysfunction. CTM.  Ethics: Full code      Pt requires ICU level care for concern for neurological deterioration.

## 2024-04-02 NOTE — DIETITIAN INITIAL EVALUATION ADULT - PERTINENT MEDS FT
Lovenox, D5 + lactated ringers @ 75 ml/hr, Precedex, Protonix, Thiamine, Miralax, Senna, Maalox, MVI, Zofran

## 2024-04-03 LAB
ALBUMIN SERPL ELPH-MCNC: 2.9 G/DL — LOW (ref 3.3–5)
ALP SERPL-CCNC: 54 U/L — SIGNIFICANT CHANGE UP (ref 40–120)
ALT FLD-CCNC: 26 U/L — SIGNIFICANT CHANGE UP (ref 12–78)
ANION GAP SERPL CALC-SCNC: 7 MMOL/L — SIGNIFICANT CHANGE UP (ref 5–17)
APPEARANCE UR: ABNORMAL
AST SERPL-CCNC: 41 U/L — HIGH (ref 15–37)
BACTERIA # UR AUTO: ABNORMAL /HPF
BILIRUB SERPL-MCNC: 0.8 MG/DL — SIGNIFICANT CHANGE UP (ref 0.2–1.2)
BILIRUB UR-MCNC: NEGATIVE — SIGNIFICANT CHANGE UP
BUN SERPL-MCNC: 3 MG/DL — LOW (ref 7–23)
CALCIUM SERPL-MCNC: 8.7 MG/DL — SIGNIFICANT CHANGE UP (ref 8.5–10.1)
CHLORIDE SERPL-SCNC: 110 MMOL/L — HIGH (ref 96–108)
CO2 SERPL-SCNC: 21 MMOL/L — LOW (ref 22–31)
COLOR SPEC: YELLOW — SIGNIFICANT CHANGE UP
COMMENT - URINE: SIGNIFICANT CHANGE UP
CREAT SERPL-MCNC: 0.87 MG/DL — SIGNIFICANT CHANGE UP (ref 0.5–1.3)
DIFF PNL FLD: NEGATIVE — SIGNIFICANT CHANGE UP
EGFR: 101 ML/MIN/1.73M2 — SIGNIFICANT CHANGE UP
EPI CELLS # UR: PRESENT
GLUCOSE BLDC GLUCOMTR-MCNC: 117 MG/DL — HIGH (ref 70–99)
GLUCOSE SERPL-MCNC: 105 MG/DL — HIGH (ref 70–99)
GLUCOSE UR QL: NEGATIVE MG/DL — SIGNIFICANT CHANGE UP
GRAM STN FLD: ABNORMAL
HCT VFR BLD CALC: 34.2 % — LOW (ref 39–50)
HCT VFR BLD CALC: 35.7 % — LOW (ref 39–50)
HGB BLD-MCNC: 11.2 G/DL — LOW (ref 13–17)
HGB BLD-MCNC: 11.6 G/DL — LOW (ref 13–17)
KETONES UR-MCNC: NEGATIVE MG/DL — SIGNIFICANT CHANGE UP
LEUKOCYTE ESTERASE UR-ACNC: ABNORMAL
MAGNESIUM SERPL-MCNC: 1.7 MG/DL — SIGNIFICANT CHANGE UP (ref 1.6–2.6)
MCHC RBC-ENTMCNC: 24.1 PG — LOW (ref 27–34)
MCHC RBC-ENTMCNC: 24.4 PG — LOW (ref 27–34)
MCHC RBC-ENTMCNC: 32.5 G/DL — SIGNIFICANT CHANGE UP (ref 32–36)
MCHC RBC-ENTMCNC: 32.7 G/DL — SIGNIFICANT CHANGE UP (ref 32–36)
MCV RBC AUTO: 74.2 FL — LOW (ref 80–100)
MCV RBC AUTO: 74.5 FL — LOW (ref 80–100)
NITRITE UR-MCNC: POSITIVE
NRBC # BLD: 0 /100 WBCS — SIGNIFICANT CHANGE UP (ref 0–0)
NRBC # BLD: 0 /100 WBCS — SIGNIFICANT CHANGE UP (ref 0–0)
PH UR: 7.5 — SIGNIFICANT CHANGE UP (ref 5–8)
PHOSPHATE SERPL-MCNC: 2.8 MG/DL — SIGNIFICANT CHANGE UP (ref 2.5–4.5)
PLATELET # BLD AUTO: 114 K/UL — LOW (ref 150–400)
PLATELET # BLD AUTO: 116 K/UL — LOW (ref 150–400)
POTASSIUM SERPL-MCNC: 3.6 MMOL/L — SIGNIFICANT CHANGE UP (ref 3.5–5.3)
POTASSIUM SERPL-SCNC: 3.6 MMOL/L — SIGNIFICANT CHANGE UP (ref 3.5–5.3)
PROCALCITONIN SERPL-MCNC: 0.06 NG/ML — SIGNIFICANT CHANGE UP (ref 0.02–0.1)
PROT SERPL-MCNC: 7.3 GM/DL — SIGNIFICANT CHANGE UP (ref 6–8.3)
PROT UR-MCNC: NEGATIVE MG/DL — SIGNIFICANT CHANGE UP
RAPID RVP RESULT: SIGNIFICANT CHANGE UP
RBC # BLD: 4.59 M/UL — SIGNIFICANT CHANGE UP (ref 4.2–5.8)
RBC # BLD: 4.81 M/UL — SIGNIFICANT CHANGE UP (ref 4.2–5.8)
RBC # FLD: 21.5 % — HIGH (ref 10.3–14.5)
RBC # FLD: 21.7 % — HIGH (ref 10.3–14.5)
RBC CASTS # UR COMP ASSIST: 2 /HPF — SIGNIFICANT CHANGE UP (ref 0–4)
SARS-COV-2 RNA SPEC QL NAA+PROBE: SIGNIFICANT CHANGE UP
SODIUM SERPL-SCNC: 138 MMOL/L — SIGNIFICANT CHANGE UP (ref 135–145)
SP GR SPEC: 1.01 — SIGNIFICANT CHANGE UP (ref 1–1.03)
SPECIMEN SOURCE: SIGNIFICANT CHANGE UP
UROBILINOGEN FLD QL: 1 MG/DL — SIGNIFICANT CHANGE UP (ref 0.2–1)
WBC # BLD: 5.19 K/UL — SIGNIFICANT CHANGE UP (ref 3.8–10.5)
WBC # BLD: 5.93 K/UL — SIGNIFICANT CHANGE UP (ref 3.8–10.5)
WBC # FLD AUTO: 5.19 K/UL — SIGNIFICANT CHANGE UP (ref 3.8–10.5)
WBC # FLD AUTO: 5.93 K/UL — SIGNIFICANT CHANGE UP (ref 3.8–10.5)
WBC UR QL: 6 /HPF — HIGH (ref 0–5)

## 2024-04-03 PROCEDURE — 99291 CRITICAL CARE FIRST HOUR: CPT

## 2024-04-03 RX ORDER — POTASSIUM CHLORIDE 20 MEQ
10 PACKET (EA) ORAL
Refills: 0 | Status: COMPLETED | OUTPATIENT
Start: 2024-04-03 | End: 2024-04-03

## 2024-04-03 RX ORDER — POTASSIUM CHLORIDE 20 MEQ
20 PACKET (EA) ORAL ONCE
Refills: 0 | Status: DISCONTINUED | OUTPATIENT
Start: 2024-04-03 | End: 2024-04-03

## 2024-04-03 RX ORDER — CEFTRIAXONE 500 MG/1
1000 INJECTION, POWDER, FOR SOLUTION INTRAMUSCULAR; INTRAVENOUS ONCE
Refills: 0 | Status: COMPLETED | OUTPATIENT
Start: 2024-04-03 | End: 2024-04-03

## 2024-04-03 RX ORDER — CEFTRIAXONE 500 MG/1
1000 INJECTION, POWDER, FOR SOLUTION INTRAMUSCULAR; INTRAVENOUS EVERY 24 HOURS
Refills: 0 | Status: COMPLETED | OUTPATIENT
Start: 2024-04-04 | End: 2024-04-05

## 2024-04-03 RX ORDER — MAGNESIUM SULFATE 500 MG/ML
1 VIAL (ML) INJECTION ONCE
Refills: 0 | Status: COMPLETED | OUTPATIENT
Start: 2024-04-03 | End: 2024-04-03

## 2024-04-03 RX ORDER — CEFTRIAXONE 500 MG/1
INJECTION, POWDER, FOR SOLUTION INTRAMUSCULAR; INTRAVENOUS
Refills: 0 | Status: COMPLETED | OUTPATIENT
Start: 2024-04-03 | End: 2024-04-06

## 2024-04-03 RX ORDER — ACETAMINOPHEN 500 MG
1000 TABLET ORAL ONCE
Refills: 0 | Status: COMPLETED | OUTPATIENT
Start: 2024-04-03 | End: 2024-04-03

## 2024-04-03 RX ADMIN — Medication 3 MILLILITER(S): at 12:06

## 2024-04-03 RX ADMIN — SODIUM CHLORIDE 4 MILLILITER(S): 9 INJECTION INTRAMUSCULAR; INTRAVENOUS; SUBCUTANEOUS at 05:36

## 2024-04-03 RX ADMIN — Medication 100 MILLIEQUIVALENT(S): at 09:57

## 2024-04-03 RX ADMIN — SODIUM CHLORIDE 75 MILLILITER(S): 9 INJECTION, SOLUTION INTRAVENOUS at 11:54

## 2024-04-03 RX ADMIN — Medication 3 MILLILITER(S): at 00:04

## 2024-04-03 RX ADMIN — Medication 105 MILLIGRAM(S): at 14:08

## 2024-04-03 RX ADMIN — SODIUM CHLORIDE 4 MILLILITER(S): 9 INJECTION INTRAMUSCULAR; INTRAVENOUS; SUBCUTANEOUS at 12:05

## 2024-04-03 RX ADMIN — Medication 3 MILLILITER(S): at 05:35

## 2024-04-03 RX ADMIN — Medication 1000 MILLIGRAM(S): at 21:27

## 2024-04-03 RX ADMIN — Medication 1000 MILLIGRAM(S): at 12:48

## 2024-04-03 RX ADMIN — ENOXAPARIN SODIUM 40 MILLIGRAM(S): 100 INJECTION SUBCUTANEOUS at 20:27

## 2024-04-03 RX ADMIN — Medication 400 MILLIGRAM(S): at 20:27

## 2024-04-03 RX ADMIN — SODIUM CHLORIDE 4 MILLILITER(S): 9 INJECTION INTRAMUSCULAR; INTRAVENOUS; SUBCUTANEOUS at 17:02

## 2024-04-03 RX ADMIN — SODIUM CHLORIDE 4 MILLILITER(S): 9 INJECTION INTRAMUSCULAR; INTRAVENOUS; SUBCUTANEOUS at 00:04

## 2024-04-03 RX ADMIN — CEFTRIAXONE 1000 MILLIGRAM(S): 500 INJECTION, POWDER, FOR SOLUTION INTRAMUSCULAR; INTRAVENOUS at 08:57

## 2024-04-03 RX ADMIN — Medication 100 MILLIEQUIVALENT(S): at 07:55

## 2024-04-03 RX ADMIN — Medication 105 MILLIGRAM(S): at 21:42

## 2024-04-03 RX ADMIN — Medication 400 MILLIGRAM(S): at 12:45

## 2024-04-03 RX ADMIN — Medication 3 MILLILITER(S): at 17:02

## 2024-04-03 RX ADMIN — Medication 100 MILLIEQUIVALENT(S): at 11:00

## 2024-04-03 RX ADMIN — Medication 100 GRAM(S): at 08:58

## 2024-04-03 RX ADMIN — Medication 105 MILLIGRAM(S): at 05:39

## 2024-04-03 RX ADMIN — CHLORHEXIDINE GLUCONATE 1 APPLICATION(S): 213 SOLUTION TOPICAL at 05:39

## 2024-04-03 NOTE — PROGRESS NOTE ADULT - ASSESSMENT
57 y/o male from home w/ PMH of ETOH use, gastritis, HLD and multiple admissions for ETOH withdrawal and Dts p/w withdrawal.  Neuro: S/p phenobarbital load. Did not require prns overnight. Monitor LFTs. Thiamine high dose for three days. Folic acid. MV as patient tolerates.  CV: MICHELLE  Respiratory: MICHELLE  GI: NPO for now  Renal: MICHELLE. Monitor UOP.   ENdocrine: FS q6hrs while NPO  Heme: Lovenox for DVT ppx. Thombocytopenia, likely in setting of  liver dysfunction. CTM.  ID: UA+, fever. Will tx with ceftriaxone 1g daily for 3 days for UTI. F/u cultures.  Ethics: Full code      Pt requires ICU level care for concern for neurological deterioration.

## 2024-04-04 LAB
-  STAPHYLOCOCCUS EPIDERMIDIS: SIGNIFICANT CHANGE UP
ALBUMIN SERPL ELPH-MCNC: 2.7 G/DL — LOW (ref 3.3–5)
ALP SERPL-CCNC: 50 U/L — SIGNIFICANT CHANGE UP (ref 40–120)
ALT FLD-CCNC: 28 U/L — SIGNIFICANT CHANGE UP (ref 12–78)
ANION GAP SERPL CALC-SCNC: 11 MMOL/L — SIGNIFICANT CHANGE UP (ref 5–17)
AST SERPL-CCNC: 56 U/L — HIGH (ref 15–37)
BASOPHILS # BLD AUTO: 0.02 K/UL — SIGNIFICANT CHANGE UP (ref 0–0.2)
BASOPHILS NFR BLD AUTO: 0.4 % — SIGNIFICANT CHANGE UP (ref 0–2)
BILIRUB SERPL-MCNC: 0.7 MG/DL — SIGNIFICANT CHANGE UP (ref 0.2–1.2)
BUN SERPL-MCNC: 5 MG/DL — LOW (ref 7–23)
CALCIUM SERPL-MCNC: 8.3 MG/DL — LOW (ref 8.5–10.1)
CHLORIDE SERPL-SCNC: 108 MMOL/L — SIGNIFICANT CHANGE UP (ref 96–108)
CO2 SERPL-SCNC: 20 MMOL/L — LOW (ref 22–31)
CREAT SERPL-MCNC: 0.67 MG/DL — SIGNIFICANT CHANGE UP (ref 0.5–1.3)
CULTURE RESULTS: ABNORMAL
CULTURE RESULTS: ABNORMAL
EGFR: 110 ML/MIN/1.73M2 — SIGNIFICANT CHANGE UP
EOSINOPHIL # BLD AUTO: 0.01 K/UL — SIGNIFICANT CHANGE UP (ref 0–0.5)
EOSINOPHIL NFR BLD AUTO: 0.2 % — SIGNIFICANT CHANGE UP (ref 0–6)
GLUCOSE BLDC GLUCOMTR-MCNC: 102 MG/DL — HIGH (ref 70–99)
GLUCOSE BLDC GLUCOMTR-MCNC: 107 MG/DL — HIGH (ref 70–99)
GLUCOSE BLDC GLUCOMTR-MCNC: 93 MG/DL — SIGNIFICANT CHANGE UP (ref 70–99)
GLUCOSE SERPL-MCNC: 116 MG/DL — HIGH (ref 70–99)
GRAM STN FLD: ABNORMAL
HCT VFR BLD CALC: 32.7 % — LOW (ref 39–50)
HGB BLD-MCNC: 10.8 G/DL — LOW (ref 13–17)
IMM GRANULOCYTES NFR BLD AUTO: 0.2 % — SIGNIFICANT CHANGE UP (ref 0–0.9)
LYMPHOCYTES # BLD AUTO: 0.56 K/UL — LOW (ref 1–3.3)
LYMPHOCYTES # BLD AUTO: 12.3 % — LOW (ref 13–44)
MAGNESIUM SERPL-MCNC: 1.8 MG/DL — SIGNIFICANT CHANGE UP (ref 1.6–2.6)
MCHC RBC-ENTMCNC: 24.4 PG — LOW (ref 27–34)
MCHC RBC-ENTMCNC: 33 G/DL — SIGNIFICANT CHANGE UP (ref 32–36)
MCV RBC AUTO: 73.8 FL — LOW (ref 80–100)
METHOD TYPE: SIGNIFICANT CHANGE UP
MONOCYTES # BLD AUTO: 0.49 K/UL — SIGNIFICANT CHANGE UP (ref 0–0.9)
MONOCYTES NFR BLD AUTO: 10.8 % — SIGNIFICANT CHANGE UP (ref 2–14)
MRSA PCR RESULT.: SIGNIFICANT CHANGE UP
NEUTROPHILS # BLD AUTO: 3.46 K/UL — SIGNIFICANT CHANGE UP (ref 1.8–7.4)
NEUTROPHILS NFR BLD AUTO: 76.1 % — SIGNIFICANT CHANGE UP (ref 43–77)
NRBC # BLD: 0 /100 WBCS — SIGNIFICANT CHANGE UP (ref 0–0)
ORGANISM # SPEC MICROSCOPIC CNT: ABNORMAL
ORGANISM # SPEC MICROSCOPIC CNT: SIGNIFICANT CHANGE UP
PHOSPHATE SERPL-MCNC: 2.7 MG/DL — SIGNIFICANT CHANGE UP (ref 2.5–4.5)
PLATELET # BLD AUTO: 110 K/UL — LOW (ref 150–400)
POTASSIUM SERPL-MCNC: 3.3 MMOL/L — LOW (ref 3.5–5.3)
POTASSIUM SERPL-SCNC: 3.3 MMOL/L — LOW (ref 3.5–5.3)
PROCALCITONIN SERPL-MCNC: 2.64 NG/ML — HIGH (ref 0.02–0.1)
PROT SERPL-MCNC: 7.2 GM/DL — SIGNIFICANT CHANGE UP (ref 6–8.3)
RBC # BLD: 4.43 M/UL — SIGNIFICANT CHANGE UP (ref 4.2–5.8)
RBC # FLD: 22.4 % — HIGH (ref 10.3–14.5)
S AUREUS DNA NOSE QL NAA+PROBE: SIGNIFICANT CHANGE UP
SODIUM SERPL-SCNC: 139 MMOL/L — SIGNIFICANT CHANGE UP (ref 135–145)
SPECIMEN SOURCE: SIGNIFICANT CHANGE UP
WBC # BLD: 4.55 K/UL — SIGNIFICANT CHANGE UP (ref 3.8–10.5)
WBC # FLD AUTO: 4.55 K/UL — SIGNIFICANT CHANGE UP (ref 3.8–10.5)

## 2024-04-04 PROCEDURE — 99232 SBSQ HOSP IP/OBS MODERATE 35: CPT

## 2024-04-04 RX ORDER — MAGNESIUM SULFATE 500 MG/ML
2 VIAL (ML) INJECTION ONCE
Refills: 0 | Status: COMPLETED | OUTPATIENT
Start: 2024-04-04 | End: 2024-04-04

## 2024-04-04 RX ORDER — PANTOPRAZOLE SODIUM 20 MG/1
40 TABLET, DELAYED RELEASE ORAL DAILY
Refills: 0 | Status: DISCONTINUED | OUTPATIENT
Start: 2024-04-04 | End: 2024-04-07

## 2024-04-04 RX ORDER — PHENOBARBITAL 60 MG
130 TABLET ORAL
Refills: 0 | Status: DISCONTINUED | OUTPATIENT
Start: 2024-04-04 | End: 2024-04-07

## 2024-04-04 RX ORDER — VANCOMYCIN HCL 1 G
1000 VIAL (EA) INTRAVENOUS ONCE
Refills: 0 | Status: COMPLETED | OUTPATIENT
Start: 2024-04-04 | End: 2024-04-04

## 2024-04-04 RX ORDER — THIAMINE MONONITRATE (VIT B1) 100 MG
100 TABLET ORAL DAILY
Refills: 0 | Status: DISCONTINUED | OUTPATIENT
Start: 2024-04-04 | End: 2024-04-08

## 2024-04-04 RX ORDER — ACETAMINOPHEN 500 MG
1000 TABLET ORAL ONCE
Refills: 0 | Status: COMPLETED | OUTPATIENT
Start: 2024-04-04 | End: 2024-04-04

## 2024-04-04 RX ORDER — POTASSIUM CHLORIDE 20 MEQ
10 PACKET (EA) ORAL
Refills: 0 | Status: COMPLETED | OUTPATIENT
Start: 2024-04-04 | End: 2024-04-04

## 2024-04-04 RX ADMIN — Medication 130 MILLIGRAM(S): at 16:19

## 2024-04-04 RX ADMIN — Medication 1000 MILLIGRAM(S): at 12:00

## 2024-04-04 RX ADMIN — SODIUM CHLORIDE 4 MILLILITER(S): 9 INJECTION INTRAMUSCULAR; INTRAVENOUS; SUBCUTANEOUS at 05:37

## 2024-04-04 RX ADMIN — Medication 3 MILLILITER(S): at 00:05

## 2024-04-04 RX ADMIN — ENOXAPARIN SODIUM 40 MILLIGRAM(S): 100 INJECTION SUBCUTANEOUS at 20:24

## 2024-04-04 RX ADMIN — CEFTRIAXONE 100 MILLIGRAM(S): 500 INJECTION, POWDER, FOR SOLUTION INTRAMUSCULAR; INTRAVENOUS at 09:03

## 2024-04-04 RX ADMIN — Medication 250 MILLIGRAM(S): at 07:27

## 2024-04-04 RX ADMIN — Medication 100 MILLIEQUIVALENT(S): at 06:36

## 2024-04-04 RX ADMIN — Medication 400 MILLIGRAM(S): at 11:32

## 2024-04-04 RX ADMIN — SODIUM CHLORIDE 75 MILLILITER(S): 9 INJECTION, SOLUTION INTRAVENOUS at 19:01

## 2024-04-04 RX ADMIN — Medication 25 GRAM(S): at 04:32

## 2024-04-04 RX ADMIN — SODIUM CHLORIDE 75 MILLILITER(S): 9 INJECTION, SOLUTION INTRAVENOUS at 18:53

## 2024-04-04 RX ADMIN — Medication 3 MILLILITER(S): at 11:06

## 2024-04-04 RX ADMIN — Medication 130 MILLIGRAM(S): at 19:00

## 2024-04-04 RX ADMIN — SODIUM CHLORIDE 4 MILLILITER(S): 9 INJECTION INTRAMUSCULAR; INTRAVENOUS; SUBCUTANEOUS at 00:05

## 2024-04-04 RX ADMIN — Medication 130 MILLIGRAM(S): at 21:02

## 2024-04-04 RX ADMIN — Medication 130 MILLIGRAM(S): at 23:06

## 2024-04-04 RX ADMIN — Medication 3 MILLILITER(S): at 17:04

## 2024-04-04 RX ADMIN — Medication 100 MILLIGRAM(S): at 11:31

## 2024-04-04 RX ADMIN — CHLORHEXIDINE GLUCONATE 1 APPLICATION(S): 213 SOLUTION TOPICAL at 05:55

## 2024-04-04 RX ADMIN — PANTOPRAZOLE SODIUM 40 MILLIGRAM(S): 20 TABLET, DELAYED RELEASE ORAL at 11:31

## 2024-04-04 RX ADMIN — Medication 100 MILLIEQUIVALENT(S): at 06:00

## 2024-04-04 RX ADMIN — SODIUM CHLORIDE 75 MILLILITER(S): 9 INJECTION, SOLUTION INTRAVENOUS at 02:16

## 2024-04-04 RX ADMIN — SENNA PLUS 2 TABLET(S): 8.6 TABLET ORAL at 21:03

## 2024-04-04 RX ADMIN — SODIUM CHLORIDE 4 MILLILITER(S): 9 INJECTION INTRAMUSCULAR; INTRAVENOUS; SUBCUTANEOUS at 17:04

## 2024-04-04 RX ADMIN — Medication 100 MILLIEQUIVALENT(S): at 07:57

## 2024-04-04 RX ADMIN — SODIUM CHLORIDE 4 MILLILITER(S): 9 INJECTION INTRAMUSCULAR; INTRAVENOUS; SUBCUTANEOUS at 11:07

## 2024-04-04 RX ADMIN — Medication 3 MILLILITER(S): at 05:37

## 2024-04-04 RX ADMIN — Medication 105 MILLIGRAM(S): at 05:55

## 2024-04-04 NOTE — PROGRESS NOTE ADULT - TIME BILLING
I spent 35 minutes providing medical care for this patient. This includes reviewing labs, consultant notes, vital signs, ins and outs, medication list, any imaging obtained, examining and assessing patient, discussing with patient and/or HCP or representative of patient. This does not include any procedure related time.

## 2024-04-04 NOTE — PROGRESS NOTE ADULT - ASSESSMENT
59 y/o male from home w/ PMH of ETOH use, gastritis, HLD and multiple admissions for ETOH withdrawal and Dts p/w withdrawal.  Neuro: S/p phenobarbital load. Did not require prns overnight. Monitor LFTs. Thiamine, Folic acid. MV as patient tolerates.  CV: MICHELLE  Respiratory: MICHELLE  GI: Diet as tolerated.  Renal: MICHELLE. Monitor UOP. Had to be straight catheterized overnight.   ENdocrine: FS q6hrs while NPO  Heme: Lovenox for DVT ppx. Thombocytopenia, likely in setting of  liver dysfunction. Stable CTM.  ID: UA+, fever. Will tx with ceftriaxone 1g daily for 3 days for UTI. F/u cultures.  Ethics: Full code

## 2024-04-05 LAB
ALBUMIN SERPL ELPH-MCNC: 2.8 G/DL — LOW (ref 3.3–5)
ALP SERPL-CCNC: 58 U/L — SIGNIFICANT CHANGE UP (ref 40–120)
ALT FLD-CCNC: 40 U/L — SIGNIFICANT CHANGE UP (ref 12–78)
ANION GAP SERPL CALC-SCNC: 10 MMOL/L — SIGNIFICANT CHANGE UP (ref 5–17)
AST SERPL-CCNC: 145 U/L — HIGH (ref 15–37)
BASOPHILS # BLD AUTO: 0.03 K/UL — SIGNIFICANT CHANGE UP (ref 0–0.2)
BASOPHILS NFR BLD AUTO: 0.5 % — SIGNIFICANT CHANGE UP (ref 0–2)
BILIRUB SERPL-MCNC: 0.5 MG/DL — SIGNIFICANT CHANGE UP (ref 0.2–1.2)
BUN SERPL-MCNC: 6 MG/DL — LOW (ref 7–23)
CALCIUM SERPL-MCNC: 9 MG/DL — SIGNIFICANT CHANGE UP (ref 8.5–10.1)
CHLORIDE SERPL-SCNC: 109 MMOL/L — HIGH (ref 96–108)
CO2 SERPL-SCNC: 19 MMOL/L — LOW (ref 22–31)
CREAT SERPL-MCNC: 0.94 MG/DL — SIGNIFICANT CHANGE UP (ref 0.5–1.3)
EGFR: 95 ML/MIN/1.73M2 — SIGNIFICANT CHANGE UP
EOSINOPHIL # BLD AUTO: 0.14 K/UL — SIGNIFICANT CHANGE UP (ref 0–0.5)
EOSINOPHIL NFR BLD AUTO: 2.5 % — SIGNIFICANT CHANGE UP (ref 0–6)
GLUCOSE BLDC GLUCOMTR-MCNC: 89 MG/DL — SIGNIFICANT CHANGE UP (ref 70–99)
GLUCOSE BLDC GLUCOMTR-MCNC: 96 MG/DL — SIGNIFICANT CHANGE UP (ref 70–99)
GLUCOSE SERPL-MCNC: 81 MG/DL — SIGNIFICANT CHANGE UP (ref 70–99)
HCT VFR BLD CALC: 32.5 % — LOW (ref 39–50)
HGB BLD-MCNC: 10.6 G/DL — LOW (ref 13–17)
IMM GRANULOCYTES NFR BLD AUTO: 0.9 % — SIGNIFICANT CHANGE UP (ref 0–0.9)
LYMPHOCYTES # BLD AUTO: 0.96 K/UL — LOW (ref 1–3.3)
LYMPHOCYTES # BLD AUTO: 17.2 % — SIGNIFICANT CHANGE UP (ref 13–44)
MAGNESIUM SERPL-MCNC: 2 MG/DL — SIGNIFICANT CHANGE UP (ref 1.6–2.6)
MCHC RBC-ENTMCNC: 24.7 PG — LOW (ref 27–34)
MCHC RBC-ENTMCNC: 32.6 G/DL — SIGNIFICANT CHANGE UP (ref 32–36)
MCV RBC AUTO: 75.8 FL — LOW (ref 80–100)
MONOCYTES # BLD AUTO: 0.62 K/UL — SIGNIFICANT CHANGE UP (ref 0–0.9)
MONOCYTES NFR BLD AUTO: 11.1 % — SIGNIFICANT CHANGE UP (ref 2–14)
NEUTROPHILS # BLD AUTO: 3.78 K/UL — SIGNIFICANT CHANGE UP (ref 1.8–7.4)
NEUTROPHILS NFR BLD AUTO: 67.8 % — SIGNIFICANT CHANGE UP (ref 43–77)
NRBC # BLD: 0 /100 WBCS — SIGNIFICANT CHANGE UP (ref 0–0)
PHOSPHATE SERPL-MCNC: 2.2 MG/DL — LOW (ref 2.5–4.5)
PLATELET # BLD AUTO: 128 K/UL — LOW (ref 150–400)
POTASSIUM SERPL-MCNC: 3.6 MMOL/L — SIGNIFICANT CHANGE UP (ref 3.5–5.3)
POTASSIUM SERPL-SCNC: 3.6 MMOL/L — SIGNIFICANT CHANGE UP (ref 3.5–5.3)
PROT SERPL-MCNC: 7.6 GM/DL — SIGNIFICANT CHANGE UP (ref 6–8.3)
RBC # BLD: 4.29 M/UL — SIGNIFICANT CHANGE UP (ref 4.2–5.8)
RBC # FLD: 22.7 % — HIGH (ref 10.3–14.5)
SODIUM SERPL-SCNC: 138 MMOL/L — SIGNIFICANT CHANGE UP (ref 135–145)
WBC # BLD: 5.58 K/UL — SIGNIFICANT CHANGE UP (ref 3.8–10.5)
WBC # FLD AUTO: 5.58 K/UL — SIGNIFICANT CHANGE UP (ref 3.8–10.5)

## 2024-04-05 PROCEDURE — 99291 CRITICAL CARE FIRST HOUR: CPT

## 2024-04-05 RX ORDER — POTASSIUM PHOSPHATE, MONOBASIC POTASSIUM PHOSPHATE, DIBASIC 236; 224 MG/ML; MG/ML
15 INJECTION, SOLUTION INTRAVENOUS ONCE
Refills: 0 | Status: COMPLETED | OUTPATIENT
Start: 2024-04-05 | End: 2024-04-05

## 2024-04-05 RX ORDER — CEFTRIAXONE 500 MG/1
1000 INJECTION, POWDER, FOR SOLUTION INTRAMUSCULAR; INTRAVENOUS EVERY 24 HOURS
Refills: 0 | Status: DISCONTINUED | OUTPATIENT
Start: 2024-04-06 | End: 2024-04-06

## 2024-04-05 RX ORDER — DEXMEDETOMIDINE HYDROCHLORIDE IN 0.9% SODIUM CHLORIDE 4 UG/ML
0.2 INJECTION INTRAVENOUS
Qty: 200 | Refills: 0 | Status: DISCONTINUED | OUTPATIENT
Start: 2024-04-05 | End: 2024-04-07

## 2024-04-05 RX ORDER — IPRATROPIUM/ALBUTEROL SULFATE 18-103MCG
3 AEROSOL WITH ADAPTER (GRAM) INHALATION EVERY 6 HOURS
Refills: 0 | Status: DISCONTINUED | OUTPATIENT
Start: 2024-04-05 | End: 2024-04-08

## 2024-04-05 RX ADMIN — DEXMEDETOMIDINE HYDROCHLORIDE IN 0.9% SODIUM CHLORIDE 3.96 MICROGRAM(S)/KG/HR: 4 INJECTION INTRAVENOUS at 19:09

## 2024-04-05 RX ADMIN — DEXMEDETOMIDINE HYDROCHLORIDE IN 0.9% SODIUM CHLORIDE 3.96 MICROGRAM(S)/KG/HR: 4 INJECTION INTRAVENOUS at 07:58

## 2024-04-05 RX ADMIN — Medication 130 MILLIGRAM(S): at 08:07

## 2024-04-05 RX ADMIN — Medication 3 MILLILITER(S): at 00:42

## 2024-04-05 RX ADMIN — POLYETHYLENE GLYCOL 3350 17 GRAM(S): 17 POWDER, FOR SOLUTION ORAL at 11:49

## 2024-04-05 RX ADMIN — PANTOPRAZOLE SODIUM 40 MILLIGRAM(S): 20 TABLET, DELAYED RELEASE ORAL at 11:48

## 2024-04-05 RX ADMIN — POTASSIUM PHOSPHATE, MONOBASIC POTASSIUM PHOSPHATE, DIBASIC 62.5 MILLIMOLE(S): 236; 224 INJECTION, SOLUTION INTRAVENOUS at 05:04

## 2024-04-05 RX ADMIN — DEXMEDETOMIDINE HYDROCHLORIDE IN 0.9% SODIUM CHLORIDE 3.96 MICROGRAM(S)/KG/HR: 4 INJECTION INTRAVENOUS at 21:46

## 2024-04-05 RX ADMIN — Medication 130 MILLIGRAM(S): at 01:53

## 2024-04-05 RX ADMIN — DEXMEDETOMIDINE HYDROCHLORIDE IN 0.9% SODIUM CHLORIDE 3.96 MICROGRAM(S)/KG/HR: 4 INJECTION INTRAVENOUS at 04:29

## 2024-04-05 RX ADMIN — Medication 1 MILLIGRAM(S): at 11:49

## 2024-04-05 RX ADMIN — SENNA PLUS 2 TABLET(S): 8.6 TABLET ORAL at 21:26

## 2024-04-05 RX ADMIN — SODIUM CHLORIDE 4 MILLILITER(S): 9 INJECTION INTRAMUSCULAR; INTRAVENOUS; SUBCUTANEOUS at 05:16

## 2024-04-05 RX ADMIN — ENOXAPARIN SODIUM 40 MILLIGRAM(S): 100 INJECTION SUBCUTANEOUS at 20:26

## 2024-04-05 RX ADMIN — CEFTRIAXONE 100 MILLIGRAM(S): 500 INJECTION, POWDER, FOR SOLUTION INTRAMUSCULAR; INTRAVENOUS at 07:58

## 2024-04-05 RX ADMIN — CHLORHEXIDINE GLUCONATE 1 APPLICATION(S): 213 SOLUTION TOPICAL at 03:00

## 2024-04-05 RX ADMIN — DEXMEDETOMIDINE HYDROCHLORIDE IN 0.9% SODIUM CHLORIDE 3.96 MICROGRAM(S)/KG/HR: 4 INJECTION INTRAVENOUS at 11:48

## 2024-04-05 RX ADMIN — Medication 1 TABLET(S): at 11:49

## 2024-04-05 RX ADMIN — SODIUM CHLORIDE 4 MILLILITER(S): 9 INJECTION INTRAMUSCULAR; INTRAVENOUS; SUBCUTANEOUS at 00:42

## 2024-04-05 RX ADMIN — Medication 100 MILLIGRAM(S): at 11:45

## 2024-04-05 RX ADMIN — Medication 3 MILLILITER(S): at 05:16

## 2024-04-05 RX ADMIN — SODIUM CHLORIDE 75 MILLILITER(S): 9 INJECTION, SOLUTION INTRAVENOUS at 19:09

## 2024-04-05 NOTE — PROGRESS NOTE ADULT - ASSESSMENT
59 yo M with ETOH use, gastritis, HLD and multiple admissions for ETOH withdrawal and Dts p/w withdrawal.    Neuro: S/p phenobarbital load. Required multiple PRNs overnight and precedex today.  Monitor LFTs. Thiamine, Folic acid. MV as patient tolerates.  CV: MICHELLE  Respiratory: MICHELLE  GI: Diet as tolerated.  Renal: MICHELLE. Monitor UOP.   Endocrine: FS q6hrs while NPO  Heme: Lovenox for DVT ppx. Thombocytopenia, likely in setting of  liver dysfunction. Stable CTM.  ID: UA+, fever. Will tx with ceftriaxone 1g daily for 7 days for UTI given male. F/u cultures.  Ethics: Full code

## 2024-04-05 NOTE — PROVIDER CONTACT NOTE (CHANGE IN STATUS NOTIFICATION) - ACTION/TREATMENT ORDERED:
Pt unable to be redirected. titration of precedex outside of parameters, as per NP Melanie Lucas who is present at bedside.

## 2024-04-06 LAB
-  AMOXICILLIN/CLAVULANIC ACID: SIGNIFICANT CHANGE UP
-  AMPICILLIN/SULBACTAM: SIGNIFICANT CHANGE UP
-  AMPICILLIN: SIGNIFICANT CHANGE UP
-  AZTREONAM: SIGNIFICANT CHANGE UP
-  CEFAZOLIN: SIGNIFICANT CHANGE UP
-  CEFEPIME: SIGNIFICANT CHANGE UP
-  CEFOXITIN: SIGNIFICANT CHANGE UP
-  CEFTRIAXONE: SIGNIFICANT CHANGE UP
-  CIPROFLOXACIN: SIGNIFICANT CHANGE UP
-  ERTAPENEM: SIGNIFICANT CHANGE UP
-  GENTAMICIN: SIGNIFICANT CHANGE UP
-  IMIPENEM: SIGNIFICANT CHANGE UP
-  LEVOFLOXACIN: SIGNIFICANT CHANGE UP
-  MEROPENEM: SIGNIFICANT CHANGE UP
-  NITROFURANTOIN: SIGNIFICANT CHANGE UP
-  PIPERACILLIN/TAZOBACTAM: SIGNIFICANT CHANGE UP
-  TOBRAMYCIN: SIGNIFICANT CHANGE UP
-  TRIMETHOPRIM/SULFAMETHOXAZOLE: SIGNIFICANT CHANGE UP
ANION GAP SERPL CALC-SCNC: 6 MMOL/L — SIGNIFICANT CHANGE UP (ref 5–17)
BUN SERPL-MCNC: 7 MG/DL — SIGNIFICANT CHANGE UP (ref 7–23)
CALCIUM SERPL-MCNC: 9.4 MG/DL — SIGNIFICANT CHANGE UP (ref 8.5–10.1)
CHLORIDE SERPL-SCNC: 111 MMOL/L — HIGH (ref 96–108)
CO2 SERPL-SCNC: 24 MMOL/L — SIGNIFICANT CHANGE UP (ref 22–31)
CREAT SERPL-MCNC: 0.84 MG/DL — SIGNIFICANT CHANGE UP (ref 0.5–1.3)
CULTURE RESULTS: ABNORMAL
EGFR: 102 ML/MIN/1.73M2 — SIGNIFICANT CHANGE UP
GLUCOSE BLDC GLUCOMTR-MCNC: 118 MG/DL — HIGH (ref 70–99)
GLUCOSE BLDC GLUCOMTR-MCNC: 81 MG/DL — SIGNIFICANT CHANGE UP (ref 70–99)
GLUCOSE BLDC GLUCOMTR-MCNC: 93 MG/DL — SIGNIFICANT CHANGE UP (ref 70–99)
GLUCOSE SERPL-MCNC: 83 MG/DL — SIGNIFICANT CHANGE UP (ref 70–99)
HCT VFR BLD CALC: 35.7 % — LOW (ref 39–50)
HGB BLD-MCNC: 11.2 G/DL — LOW (ref 13–17)
MAGNESIUM SERPL-MCNC: 2.3 MG/DL — SIGNIFICANT CHANGE UP (ref 1.6–2.6)
MCHC RBC-ENTMCNC: 24.2 PG — LOW (ref 27–34)
MCHC RBC-ENTMCNC: 31.4 G/DL — LOW (ref 32–36)
MCV RBC AUTO: 77.1 FL — LOW (ref 80–100)
METHOD TYPE: SIGNIFICANT CHANGE UP
NRBC # BLD: 0 /100 WBCS — SIGNIFICANT CHANGE UP (ref 0–0)
ORGANISM # SPEC MICROSCOPIC CNT: ABNORMAL
ORGANISM # SPEC MICROSCOPIC CNT: SIGNIFICANT CHANGE UP
PHENOBARB SERPL-MCNC: 64.6 UG/ML — CRITICAL HIGH (ref 15–40)
PHOSPHATE SERPL-MCNC: 2.8 MG/DL — SIGNIFICANT CHANGE UP (ref 2.5–4.5)
PLATELET # BLD AUTO: 245 K/UL — SIGNIFICANT CHANGE UP (ref 150–400)
POTASSIUM SERPL-MCNC: 4.1 MMOL/L — SIGNIFICANT CHANGE UP (ref 3.5–5.3)
POTASSIUM SERPL-SCNC: 4.1 MMOL/L — SIGNIFICANT CHANGE UP (ref 3.5–5.3)
RBC # BLD: 4.63 M/UL — SIGNIFICANT CHANGE UP (ref 4.2–5.8)
RBC # FLD: 22.7 % — HIGH (ref 10.3–14.5)
SODIUM SERPL-SCNC: 141 MMOL/L — SIGNIFICANT CHANGE UP (ref 135–145)
SPECIMEN SOURCE: SIGNIFICANT CHANGE UP
WBC # BLD: 4.06 K/UL — SIGNIFICANT CHANGE UP (ref 3.8–10.5)
WBC # FLD AUTO: 4.06 K/UL — SIGNIFICANT CHANGE UP (ref 3.8–10.5)

## 2024-04-06 PROCEDURE — 99291 CRITICAL CARE FIRST HOUR: CPT

## 2024-04-06 RX ORDER — CHLORHEXIDINE GLUCONATE 213 G/1000ML
1 SOLUTION TOPICAL
Refills: 0 | Status: DISCONTINUED | OUTPATIENT
Start: 2024-04-06 | End: 2024-04-08

## 2024-04-06 RX ORDER — SODIUM CHLORIDE 9 MG/ML
10 INJECTION INTRAMUSCULAR; INTRAVENOUS; SUBCUTANEOUS
Refills: 0 | Status: DISCONTINUED | OUTPATIENT
Start: 2024-04-06 | End: 2024-04-08

## 2024-04-06 RX ORDER — ACETAMINOPHEN 500 MG
1000 TABLET ORAL ONCE
Refills: 0 | Status: COMPLETED | OUTPATIENT
Start: 2024-04-06 | End: 2024-04-06

## 2024-04-06 RX ORDER — PIPERACILLIN AND TAZOBACTAM 4; .5 G/20ML; G/20ML
3.38 INJECTION, POWDER, LYOPHILIZED, FOR SOLUTION INTRAVENOUS EVERY 8 HOURS
Refills: 0 | Status: DISCONTINUED | OUTPATIENT
Start: 2024-04-06 | End: 2024-04-08

## 2024-04-06 RX ORDER — CHLORHEXIDINE GLUCONATE 213 G/1000ML
1 SOLUTION TOPICAL
Refills: 0 | Status: DISCONTINUED | OUTPATIENT
Start: 2024-04-06 | End: 2024-04-06

## 2024-04-06 RX ADMIN — Medication 130 MILLIGRAM(S): at 11:11

## 2024-04-06 RX ADMIN — Medication 1000 MILLIGRAM(S): at 19:25

## 2024-04-06 RX ADMIN — Medication 1 TABLET(S): at 11:10

## 2024-04-06 RX ADMIN — ENOXAPARIN SODIUM 40 MILLIGRAM(S): 100 INJECTION SUBCUTANEOUS at 19:37

## 2024-04-06 RX ADMIN — DEXMEDETOMIDINE HYDROCHLORIDE IN 0.9% SODIUM CHLORIDE 3.96 MICROGRAM(S)/KG/HR: 4 INJECTION INTRAVENOUS at 09:14

## 2024-04-06 RX ADMIN — Medication 1 MILLIGRAM(S): at 11:10

## 2024-04-06 RX ADMIN — CHLORHEXIDINE GLUCONATE 1 APPLICATION(S): 213 SOLUTION TOPICAL at 04:00

## 2024-04-06 RX ADMIN — SODIUM CHLORIDE 75 MILLILITER(S): 9 INJECTION, SOLUTION INTRAVENOUS at 19:36

## 2024-04-06 RX ADMIN — Medication 400 MILLIGRAM(S): at 18:25

## 2024-04-06 RX ADMIN — PANTOPRAZOLE SODIUM 40 MILLIGRAM(S): 20 TABLET, DELAYED RELEASE ORAL at 11:10

## 2024-04-06 RX ADMIN — SENNA PLUS 2 TABLET(S): 8.6 TABLET ORAL at 22:20

## 2024-04-06 RX ADMIN — Medication 100 MILLIGRAM(S): at 11:40

## 2024-04-06 RX ADMIN — Medication 130 MILLIGRAM(S): at 01:21

## 2024-04-06 RX ADMIN — POLYETHYLENE GLYCOL 3350 17 GRAM(S): 17 POWDER, FOR SOLUTION ORAL at 11:11

## 2024-04-06 RX ADMIN — SODIUM CHLORIDE 75 MILLILITER(S): 9 INJECTION, SOLUTION INTRAVENOUS at 11:59

## 2024-04-06 RX ADMIN — CEFTRIAXONE 100 MILLIGRAM(S): 500 INJECTION, POWDER, FOR SOLUTION INTRAMUSCULAR; INTRAVENOUS at 01:22

## 2024-04-06 RX ADMIN — Medication 130 MILLIGRAM(S): at 05:51

## 2024-04-06 NOTE — PROGRESS NOTE ADULT - ASSESSMENT
57 yo M with ETOH use, gastritis, HLD and multiple admissions for ETOH withdrawal and Dts p/w withdrawal.    Neuro: S/p phenobarbital load. Required multiple PRNs overnight and precedex today.  Monitor LFTs. Thiamine, Folic acid. MV as patient tolerates.  CV: MICHELLE  Respiratory: MICHELLE  GI: Diet as tolerated.  Renal: MICHELLE. Monitor UOP.   Endocrine: FS q6hrs while NPO  Heme: Lovenox for DVT ppx. Thombocytopenia, likely in setting of  liver dysfunction. Stable CTM.  ID: UA+, fever. Will tx with ceftriaxone 1g daily for 7 days for UTI given male. F/u cultures. Blood cultures noted GPCs in anaerobic bottle pending.   Ethics: Full code

## 2024-04-06 NOTE — PROCEDURE NOTE - ADDITIONAL PROCEDURE DETAILS
Pt had two PIVs, left AC and left upper arm. Both of these PIVs infiltrated. Myself and two nurses made multiple attempts to obtain new peripheral access. I was able to obtain one under ultrasound guidance, however, it infiltrated within 30 minutes. Pt agitated. Right femoral CVC inserted for safe and efficient access. Can be switched to IJ access once agitation is under control today.    Dx: EtOH withdrawal    Procedure performed independent of critical care time.
Dx: EtOH withdrawal
severe etoh withdrawal with DTs: F10.231

## 2024-04-06 NOTE — PROCEDURE NOTE - NSPROCDETAILS_GEN_ALL_CORE
location identified, draped/prepped, sterile technique used/blood seen on insertion/dressing applied/flushes easily/secured in place
location identified, draped/prepped, sterile technique used/blood seen on insertion/dressing applied/flushes easily/secured in place/sterile technique, catheter placed
guidewire recovered/lumen(s) aspirated and flushed/sterile dressing applied/sterile technique, catheter placed/ultrasound guidance with use of sterile gel and probe cove

## 2024-04-06 NOTE — PROCEDURE NOTE - NSPOSTCAREGUIDE_GEN_A_CORE
Verbal/written post procedure instructions were given to patient/caregiver/Instructed patient/caregiver to follow-up with primary care physician/Instructed patient/caregiver regarding signs and symptoms of infection/Keep the cast/splint/dressing clean and dry
Care for catheter as per unit/ICU protocols

## 2024-04-07 LAB
-  AMPICILLIN/SULBACTAM: SIGNIFICANT CHANGE UP
-  CEFAZOLIN: SIGNIFICANT CHANGE UP
-  CLINDAMYCIN: SIGNIFICANT CHANGE UP
-  ERYTHROMYCIN: SIGNIFICANT CHANGE UP
-  GENTAMICIN: SIGNIFICANT CHANGE UP
-  OXACILLIN: SIGNIFICANT CHANGE UP
-  PENICILLIN: SIGNIFICANT CHANGE UP
-  RIFAMPIN: SIGNIFICANT CHANGE UP
-  TETRACYCLINE: SIGNIFICANT CHANGE UP
-  TRIMETHOPRIM/SULFAMETHOXAZOLE: SIGNIFICANT CHANGE UP
-  VANCOMYCIN: SIGNIFICANT CHANGE UP
ALBUMIN SERPL ELPH-MCNC: 2.7 G/DL — LOW (ref 3.3–5)
ALP SERPL-CCNC: 45 U/L — SIGNIFICANT CHANGE UP (ref 40–120)
ALT FLD-CCNC: 67 U/L — SIGNIFICANT CHANGE UP (ref 12–78)
ANION GAP SERPL CALC-SCNC: 6 MMOL/L — SIGNIFICANT CHANGE UP (ref 5–17)
AST SERPL-CCNC: 265 U/L — HIGH (ref 15–37)
BILIRUB SERPL-MCNC: 0.3 MG/DL — SIGNIFICANT CHANGE UP (ref 0.2–1.2)
BUN SERPL-MCNC: 7 MG/DL — SIGNIFICANT CHANGE UP (ref 7–23)
CALCIUM SERPL-MCNC: 8.7 MG/DL — SIGNIFICANT CHANGE UP (ref 8.5–10.1)
CHLORIDE SERPL-SCNC: 106 MMOL/L — SIGNIFICANT CHANGE UP (ref 96–108)
CO2 SERPL-SCNC: 25 MMOL/L — SIGNIFICANT CHANGE UP (ref 22–31)
CREAT SERPL-MCNC: 0.82 MG/DL — SIGNIFICANT CHANGE UP (ref 0.5–1.3)
CULTURE RESULTS: ABNORMAL
EGFR: 103 ML/MIN/1.73M2 — SIGNIFICANT CHANGE UP
GLUCOSE BLDC GLUCOMTR-MCNC: 82 MG/DL — SIGNIFICANT CHANGE UP (ref 70–99)
GLUCOSE BLDC GLUCOMTR-MCNC: 93 MG/DL — SIGNIFICANT CHANGE UP (ref 70–99)
GLUCOSE BLDC GLUCOMTR-MCNC: 97 MG/DL — SIGNIFICANT CHANGE UP (ref 70–99)
GLUCOSE BLDC GLUCOMTR-MCNC: 98 MG/DL — SIGNIFICANT CHANGE UP (ref 70–99)
GLUCOSE SERPL-MCNC: 90 MG/DL — SIGNIFICANT CHANGE UP (ref 70–99)
GRAM STN FLD: ABNORMAL
HCT VFR BLD CALC: 30.2 % — LOW (ref 39–50)
HCT VFR BLD CALC: 31.8 % — LOW (ref 39–50)
HGB BLD-MCNC: 10 G/DL — LOW (ref 13–17)
HGB BLD-MCNC: 9.7 G/DL — LOW (ref 13–17)
MAGNESIUM SERPL-MCNC: 2.1 MG/DL — SIGNIFICANT CHANGE UP (ref 1.6–2.6)
MCHC RBC-ENTMCNC: 24 PG — LOW (ref 27–34)
MCHC RBC-ENTMCNC: 24.4 PG — LOW (ref 27–34)
MCHC RBC-ENTMCNC: 31.4 G/DL — LOW (ref 32–36)
MCHC RBC-ENTMCNC: 32.1 G/DL — SIGNIFICANT CHANGE UP (ref 32–36)
MCV RBC AUTO: 76.1 FL — LOW (ref 80–100)
MCV RBC AUTO: 76.4 FL — LOW (ref 80–100)
METHOD TYPE: SIGNIFICANT CHANGE UP
NRBC # BLD: 0 /100 WBCS — SIGNIFICANT CHANGE UP (ref 0–0)
NRBC # BLD: 0 /100 WBCS — SIGNIFICANT CHANGE UP (ref 0–0)
ORGANISM # SPEC MICROSCOPIC CNT: ABNORMAL
ORGANISM # SPEC MICROSCOPIC CNT: SIGNIFICANT CHANGE UP
PHOSPHATE SERPL-MCNC: 3.6 MG/DL — SIGNIFICANT CHANGE UP (ref 2.5–4.5)
PLATELET # BLD AUTO: 269 K/UL — SIGNIFICANT CHANGE UP (ref 150–400)
PLATELET # BLD AUTO: 303 K/UL — SIGNIFICANT CHANGE UP (ref 150–400)
POTASSIUM SERPL-MCNC: 3.4 MMOL/L — LOW (ref 3.5–5.3)
POTASSIUM SERPL-SCNC: 3.4 MMOL/L — LOW (ref 3.5–5.3)
PROT SERPL-MCNC: 7.1 GM/DL — SIGNIFICANT CHANGE UP (ref 6–8.3)
RBC # BLD: 3.97 M/UL — LOW (ref 4.2–5.8)
RBC # BLD: 4.16 M/UL — LOW (ref 4.2–5.8)
RBC # FLD: 22.5 % — HIGH (ref 10.3–14.5)
RBC # FLD: 22.6 % — HIGH (ref 10.3–14.5)
SODIUM SERPL-SCNC: 137 MMOL/L — SIGNIFICANT CHANGE UP (ref 135–145)
SPECIMEN SOURCE: SIGNIFICANT CHANGE UP
WBC # BLD: 2.53 K/UL — LOW (ref 3.8–10.5)
WBC # BLD: 2.73 K/UL — LOW (ref 3.8–10.5)
WBC # FLD AUTO: 2.53 K/UL — LOW (ref 3.8–10.5)
WBC # FLD AUTO: 2.73 K/UL — LOW (ref 3.8–10.5)

## 2024-04-07 PROCEDURE — 99233 SBSQ HOSP IP/OBS HIGH 50: CPT

## 2024-04-07 PROCEDURE — 93971 EXTREMITY STUDY: CPT | Mod: 26,LT

## 2024-04-07 RX ORDER — POTASSIUM CHLORIDE 20 MEQ
10 PACKET (EA) ORAL
Refills: 0 | Status: COMPLETED | OUTPATIENT
Start: 2024-04-07 | End: 2024-04-07

## 2024-04-07 RX ORDER — LANOLIN ALCOHOL/MO/W.PET/CERES
3 CREAM (GRAM) TOPICAL AT BEDTIME
Refills: 0 | Status: DISCONTINUED | OUTPATIENT
Start: 2024-04-07 | End: 2024-04-08

## 2024-04-07 RX ORDER — ACETAMINOPHEN 500 MG
1000 TABLET ORAL ONCE
Refills: 0 | Status: COMPLETED | OUTPATIENT
Start: 2024-04-06 | End: 2024-04-07

## 2024-04-07 RX ORDER — ACETAMINOPHEN 500 MG
1000 TABLET ORAL ONCE
Refills: 0 | Status: COMPLETED | OUTPATIENT
Start: 2024-04-07 | End: 2024-04-07

## 2024-04-07 RX ADMIN — PIPERACILLIN AND TAZOBACTAM 25 GRAM(S): 4; .5 INJECTION, POWDER, LYOPHILIZED, FOR SOLUTION INTRAVENOUS at 21:32

## 2024-04-07 RX ADMIN — PIPERACILLIN AND TAZOBACTAM 25 GRAM(S): 4; .5 INJECTION, POWDER, LYOPHILIZED, FOR SOLUTION INTRAVENOUS at 05:39

## 2024-04-07 RX ADMIN — PIPERACILLIN AND TAZOBACTAM 25 GRAM(S): 4; .5 INJECTION, POWDER, LYOPHILIZED, FOR SOLUTION INTRAVENOUS at 00:19

## 2024-04-07 RX ADMIN — Medication 100 MILLIEQUIVALENT(S): at 06:50

## 2024-04-07 RX ADMIN — Medication 1000 MILLIGRAM(S): at 01:20

## 2024-04-07 RX ADMIN — Medication 100 MILLIGRAM(S): at 11:26

## 2024-04-07 RX ADMIN — Medication 130 MILLIGRAM(S): at 03:12

## 2024-04-07 RX ADMIN — CHLORHEXIDINE GLUCONATE 1 APPLICATION(S): 213 SOLUTION TOPICAL at 04:00

## 2024-04-07 RX ADMIN — Medication 1 MILLIGRAM(S): at 11:27

## 2024-04-07 RX ADMIN — PIPERACILLIN AND TAZOBACTAM 25 GRAM(S): 4; .5 INJECTION, POWDER, LYOPHILIZED, FOR SOLUTION INTRAVENOUS at 13:14

## 2024-04-07 RX ADMIN — Medication 3 MILLIGRAM(S): at 21:41

## 2024-04-07 RX ADMIN — Medication 100 MILLIEQUIVALENT(S): at 07:23

## 2024-04-07 RX ADMIN — SODIUM CHLORIDE 75 MILLILITER(S): 9 INJECTION, SOLUTION INTRAVENOUS at 05:43

## 2024-04-07 RX ADMIN — POLYETHYLENE GLYCOL 3350 17 GRAM(S): 17 POWDER, FOR SOLUTION ORAL at 11:27

## 2024-04-07 RX ADMIN — ENOXAPARIN SODIUM 40 MILLIGRAM(S): 100 INJECTION SUBCUTANEOUS at 20:33

## 2024-04-07 RX ADMIN — Medication 400 MILLIGRAM(S): at 00:20

## 2024-04-07 RX ADMIN — Medication 400 MILLIGRAM(S): at 18:25

## 2024-04-07 RX ADMIN — Medication 100 MILLIEQUIVALENT(S): at 08:18

## 2024-04-07 RX ADMIN — Medication 1 TABLET(S): at 11:27

## 2024-04-07 RX ADMIN — Medication 1000 MILLIGRAM(S): at 19:25

## 2024-04-07 NOTE — PROGRESS NOTE ADULT - PROVIDER SPECIALTY LIST ADULT
Critical Care
Hospitalist
Critical Care

## 2024-04-07 NOTE — PROGRESS NOTE ADULT - NS ATTEST RISK PROBLEM GEN_ALL_CORE FT
Improving delirium, now off precedex. staph epi in blood culture 1 of 2 bottles; 2 blood cultures pending.

## 2024-04-07 NOTE — PROGRESS NOTE ADULT - ASSESSMENT
57 yo M with ETOH use, gastritis, HLD and multiple admissions for ETOH withdrawal and Dts p/w withdrawal.    Neuro: S/p phenobarbital load. More alert today, only required 1 PRN overnight.   Monitor LFTs. Thiamine, Folic acid. MV as patient tolerates.  CV: MICHELLE  Respiratory: MICHELLE  GI: Diet as tolerated.  Renal: MICHELLE. Monitor UOP.   Endocrine: FS q6hrs while NPO  Heme: Lovenox for DVT ppx. Thombocytopenia, likely in setting of  liver dysfunction. Stable CTM.  ID: UA+, fever. + staph epi in blood culture; changed to zosyn pending repeat Blood cultures.    Ethics: Full code  Dispo: stable for transfer to floors.

## 2024-04-07 NOTE — PROGRESS NOTE ADULT - SUBJECTIVE AND OBJECTIVE BOX
INTERVAL HPI/OVERNIGHT EVENTS:    Patient agitated overnight, lost IV access, Femoral TLC placed for emergent access and patient given IV medications.      CENTRAL LINE: [ x] YES [ ] NO  LOCATION:  [facility] 4/6/2024     CHAN: [ ] YES [ ] NO        A-LINE:  [ ] YES [ ] NO  LOCATION:       GLOBAL ISSUE/BEST PRACTICE:  Analgesia:   Sedation:dexMEDEtomidine Infusion  HOB elevation: yes  Stress ulcer prophylaxis: pantoprazole  Injectable    VTE prophylaxis: enoxaparin Injectable 40 milliGRAM(s) SubCutaneous every 24 hours    Oral Care: Chlorhexidine  Glycemic control:   Nutrition:    REVIEW OF SYSTEMS:  [x ] Unable to obtain because: alcohol withdrawal    PHYSICAL EXAM:    GENERAL: Sedated on precedex, confused  HEENT: NCAT, EOMI, PERRLA, conjunctiva and sclera clear; Moist mucous membranes  NECK: Supple  CHEST/LUNG: CTABL; No rales, rhonchi, wheezing, or rubs  HEART: S1S2, RRR, No murmurs, rubs, or gallops  ABDOMEN: Soft, Nontender, Nondistended; Bowel sounds present  EXTREMITIES:  2+ Peripheral Pulses, No clubbing, cyanosis, or edema  NERVOUS SYSTEM:  Moving all ext, confused     ICU Vital Signs Last 24 Hrs  T(C): 36.6 (06 Apr 2024 04:00), Max: 37.6 (05 Apr 2024 11:00)  T(F): 97.9 (06 Apr 2024 04:00), Max: 99.7 (05 Apr 2024 11:00)  HR: 55 (06 Apr 2024 07:15) (48 - 99)  BP: 139/81 (06 Apr 2024 07:00) (116/85 - 168/118)  BP(mean): 96 (06 Apr 2024 07:00) (80 - 134)  ABP: --  ABP(mean): --  RR: 20 (06 Apr 2024 07:15) (11 - 26)  SpO2: 100% (06 Apr 2024 07:15) (95% - 100%)    O2 Parameters below as of 06 Apr 2024 06:00  Patient On (Oxygen Delivery Method): nasal cannula  O2 Flow (L/min): 2        I&O's Detail    05 Apr 2024 07:01  -  06 Apr 2024 07:00  --------------------------------------------------------  IN:    Dexmedetomidine: 213.7 mL    dextrose 5% + lactated ringers: 1725 mL    IV PiggyBack: 50 mL    IV PiggyBack: 50 mL  Total IN: 2038.7 mL    OUT:    Intermittent Catheterization - Urethral (mL): 500 mL    Voided (mL): 800 mL  Total OUT: 1300 mL    Total NET: 738.7 mL        MEDICATIONS  NEURO  Meds: dexMEDEtomidine Infusion 0.2 MICROgram(s)/kG/Hr (3.96 mL/Hr) IV Continuous <Continuous>  ondansetron Injectable 4 milliGRAM(s) IV Push every 8 hours PRN Nausea and/or Vomiting  PHENobarbital Injectable 130 milliGRAM(s) IV Push every 1 hour PRN agitation/breakthrough withdrawal sx    RESPIRATORY    Meds: albuterol/ipratropium for Nebulization 3 milliLiter(s) Nebulizer every 6 hours PRN Shortness of Breath and/or Wheezing    CARDIOVASCULAR  Meds:   GI/NUTRITION  Meds: aluminum hydroxide/magnesium hydroxide/simethicone Suspension 30 milliLiter(s) Oral every 4 hours PRN Dyspepsia  pantoprazole  Injectable 40 milliGRAM(s) IV Push daily  polyethylene glycol 3350 17 Gram(s) Oral daily  senna 2 Tablet(s) Oral at bedtime    GENITOURINARY  Meds: dextrose 5% + lactated ringers. 1000 milliLiter(s) IV Continuous <Continuous>  folic acid 1 milliGRAM(s) Oral daily  multivitamin 1 Tablet(s) Oral daily  sodium chloride 0.9% lock flush 10 milliLiter(s) IV Push every 1 hour PRN Pre/post blood products, medications, blood draw, and to maintain line patency  thiamine Injectable 100 milliGRAM(s) IV Push daily    HEMATOLOGIC  Meds: enoxaparin Injectable 40 milliGRAM(s) SubCutaneous every 24 hours    [x] VTE Prophylaxis  INFECTIOUS DISEASES  Meds: cefTRIAXone   IVPB 1000 milliGRAM(s) IV Intermittent every 24 hours    ENDOCRINE  CAPILLARY BLOOD GLUCOSE      POCT Blood Glucose.: 93 mg/dL (06 Apr 2024 04:42)  POCT Blood Glucose.: 96 mg/dL (05 Apr 2024 11:23)    Meds:   OTHER MEDICATIONS:  chlorhexidine 2% Cloths 1 Application(s) Topical <User Schedule>  chlorhexidine 2% Cloths 1 Application(s) Topical <User Schedule>  :    LABS:                        11.2   4.06  )-----------( 245      ( 06 Apr 2024 03:15 )             35.7      04-06    141  |  111<H>  |  7   ----------------------------<  83  4.1   |  24  |  0.84    Ca    9.4      06 Apr 2024 03:15  Phos  2.8     04-06  Mg     2.3     04-06    TPro  7.6  /  Alb  2.8<L>  /  TBili  0.5  /  DBili  x   /  AST  145<H>  /  ALT  40  /  AlkPhos  58  04-05      Urinalysis Basic - ( 06 Apr 2024 03:15 )    Color: x / Appearance: x / SG: x / pH: x  Gluc: 83 mg/dL / Ketone: x  / Bili: x / Urobili: x   Blood: x / Protein: x / Nitrite: x   Leuk Esterase: x / RBC: x / WBC x   Sq Epi: x / Non Sq Epi: x / Bacteria: x      Culture Results:   Growth in anaerobic bottle: Gram Positive Cocci in Clusters (04-04 @ 08:45)  Culture Results:   No growth at 24 hours (04-04 @ 08:30)            RADIOLOGY & ADDITIONAL STUDIES:    
Progress Note    VANDANA VILLA 58y (1965) Male [id number]  03-30-24 (4d)      Chief Complaint: ETOH withdrawal    Subjective:  Did not require any prns overnight. Pt had fever last night.    Review of Systems: Unable to obtain. Patient still confused.    PAST MEDICAL & SURGICAL HISTORY:  Alcohol abuse [F10.10]    PUD (peptic ulcer disease) [K27.9]    Mixed hyperlipidemia [E78.2]    EtOH dependence [F10.20]    Gastritis [K29.70]    Substance abuse [F19.10]    DTs (delirium tremens) [F10.231]    HTN (hypertension) [I10]    Elevated lipase [R74.8]    No significant past surgical history [[id number]]      albuterol/ipratropium for Nebulization 3 milliLiter(s) Nebulizer every 6 hours  aluminum hydroxide/magnesium hydroxide/simethicone Suspension 30 milliLiter(s) Oral every 4 hours PRN  cefTRIAXone   IVPB      chlorhexidine 2% Cloths 1 Application(s) Topical <User Schedule>  dextrose 5% + lactated ringers. 1000 milliLiter(s) IV Continuous <Continuous>  enoxaparin Injectable 40 milliGRAM(s) SubCutaneous every 24 hours  folic acid 1 milliGRAM(s) Oral daily  multivitamin 1 Tablet(s) Oral daily  ondansetron Injectable 4 milliGRAM(s) IV Push every 8 hours PRN  pantoprazole    Tablet 40 milliGRAM(s) Oral before breakfast  PHENobarbital Injectable 130 milliGRAM(s) IV Push every 1 hour PRN  polyethylene glycol 3350 17 Gram(s) Oral daily  senna 2 Tablet(s) Oral at bedtime  sodium chloride 3%  Inhalation 4 milliLiter(s) Inhalation every 6 hours  thiamine IVPB 500 milliGRAM(s) IV Intermittent every 8 hours    Objective:  T(C): 37.9 (04-03-24 @ 08:00), Max: 38.2 (04-02-24 @ 22:00)  HR: 119 (04-03-24 @ 10:00) (96 - 147)  BP: 141/95 (04-03-24 @ 10:00) (122/103 - 167/113)  RR: 28 (04-03-24 @ 10:00) (14 - 39)  SpO2: 96% (04-03-24 @ 10:00) (93% - 100%)    Physical exam:  GENERAL: NAD, comfortable. Not speaking English yet.  HEAD:  Atraumatic, Normocephalic  EYES: conjunctiva and sclera clear  NECK: Supple, No JVD  CHEST/LUNG: Clear to auscultation bilaterally; No wheeze  HEART: Regular rate and rhythm; No murmurs, rubs, or gallops  ABDOMEN: Soft, Nontender, Bowel sounds present  EXTREMITIES:  2+ Peripheral Pulses, No clubbing, cyanosis, or edema        04-02-24 @ 07:01  -  04-03-24 @ 07:00  --------------------------------------------------------  IN: 2100 mL / OUT: 200 mL / NET: 1900 mL    04-03-24 @ 07:01  -  04-03-24 @ 11:04  --------------------------------------------------------  IN: 650 mL / OUT: 0 mL / NET: 650 mL        CAPILLARY BLOOD GLUCOSE      (04-03 @ 05:15)                      11.2  5.19 )-----------( 116                 34.2    Neutrophils = -- (--%)  Lymphocytes = -- (--%)  Eosinophils = -- (--%)  Basophils = -- (--%)  Monocytes = -- (--%)  Bands = --%    04-03    138  |  110<H>  |  3<L>  ----------------------------<  105<H>  3.6   |  21<L>  |  0.87    Ca    8.7      03 Apr 2024 05:15  Phos  2.8     04-03  Mg     1.7     04-03    TPro  7.3  /  Alb  2.9<L>  /  TBili  0.8  /  DBili  x   /  AST  41<H>  /  ALT  26  /  AlkPhos  54  04-03          RVP:(04-03 @ 04:00)  NotDetec            Tox:         Urinalysis Basic - ( 03 Apr 2024 05:15 )    Color: x / Appearance: x / SG: x / pH: x  Gluc: 105 mg/dL / Ketone: x  / Bili: x / Urobili: x   Blood: x / Protein: x / Nitrite: x   Leuk Esterase: x / RBC: x / WBC x   Sq Epi: x / Non Sq Epi: x / Bacteria: x        WBC Trend: 5.19<--, 5.93<--, 2.88<--    Hb Trend: 11.2<--, 11.6<--, 11.9<--, 11.2<--, 13.7<--        New imaging in last 24 hours:  Consult notes reviewed:
INTERVAL HPI/OVERNIGHT EVENTS:    s/p 4 prn phenobarb and restarted on precedex.      CENTRAL LINE: [ ] YES [ x] NO  LOCATION:       CHAN: [ ] YES [x ] NO        A-LINE:  [ ] YES [ x] NO  LOCATION:       GLOBAL ISSUE/BEST PRACTICE:  Analgesia:   Sedation:dexMEDEtomidine Infusion    HOB elevation: yes  Stress ulcer prophylaxis: pantoprazole  Injectable    VTE prophylaxis: enoxaparin Injectable 40 milliGRAM(s) SubCutaneous every 24 hours    Oral Care: Chlorhexidine  Glycemic control:   Nutrition:      REVIEW OF SYSTEMS:  [x ] Unable to obtain because: sedated on precedex    PHYSICAL EXAM:    GENERAL: NAD, sleeping, calm on precedex  HEENT: NCAT, EOMI, PERRLA, conjunctiva and sclera clear; Moist mucous membranes  NECK: Supple  CHEST/LUNG: CTABL; No rales, rhonchi, wheezing, or rubs  HEART: S1S2, RRR, No murmurs, rubs, or gallops  ABDOMEN: Soft, Nontender, Nondistended; Bowel sounds present  EXTREMITIES:  2+ Peripheral Pulses, No clubbing, cyanosis, or edema  NERVOUS SYSTEM:  sedated, moving all ext equally    ICU Vital Signs Last 24 Hrs  T(C): 37.3 (05 Apr 2024 07:24), Max: 37.8 (04 Apr 2024 12:00)  T(F): 99.1 (05 Apr 2024 07:24), Max: 100.1 (04 Apr 2024 12:00)  HR: 84 (05 Apr 2024 09:00) (81 - 140)  BP: 138/99 (05 Apr 2024 09:00) (108/83 - 156/131)  BP(mean): 110 (05 Apr 2024 09:00) (85 - 139)  ABP: --  ABP(mean): --  RR: 15 (05 Apr 2024 09:00) (12 - 29)  SpO2: 99% (05 Apr 2024 09:00) (70% - 100%)    O2 Parameters below as of 05 Apr 2024 06:00  Patient On (Oxygen Delivery Method): nasal cannula  O2 Flow (L/min): 2        I&O's Detail    04 Apr 2024 07:01  -  05 Apr 2024 07:00  --------------------------------------------------------  IN:    Dexmedetomidine: 23.7 mL    dextrose 5% + lactated ringers: 1800 mL    IV PiggyBack: 200 mL    IV PiggyBack: 50 mL    IV PiggyBack: 250 mL    IV PiggyBack: 350 mL    Oral Fluid: 240 mL  Total IN: 2913.7 mL    OUT:    Incontinent per Condom Catheter (mL): 1700 mL  Total OUT: 1700 mL    Total NET: 1213.7 mL      05 Apr 2024 07:01  -  05 Apr 2024 09:24  --------------------------------------------------------  IN:    Dexmedetomidine: 29.7 mL    dextrose 5% + lactated ringers: 75 mL    IV PiggyBack: 50 mL  Total IN: 154.7 mL    OUT:  Total OUT: 0 mL    Total NET: 154.7 mL        MEDICATIONS  NEURO  Meds: dexMEDEtomidine Infusion 0.2 MICROgram(s)/kG/Hr (3.96 mL/Hr) IV Continuous <Continuous>  ondansetron Injectable 4 milliGRAM(s) IV Push every 8 hours PRN Nausea and/or Vomiting  PHENobarbital Injectable 130 milliGRAM(s) IV Push every 1 hour PRN agitation/breakthrough withdrawal sx    RESPIRATORY    Meds: albuterol/ipratropium for Nebulization 3 milliLiter(s) Nebulizer every 6 hours  sodium chloride 3%  Inhalation 4 milliLiter(s) Inhalation every 6 hours    CARDIOVASCULAR  Meds:   GI/NUTRITION  Meds: aluminum hydroxide/magnesium hydroxide/simethicone Suspension 30 milliLiter(s) Oral every 4 hours PRN Dyspepsia  pantoprazole  Injectable 40 milliGRAM(s) IV Push daily  polyethylene glycol 3350 17 Gram(s) Oral daily  senna 2 Tablet(s) Oral at bedtime    GENITOURINARY  Meds: dextrose 5% + lactated ringers. 1000 milliLiter(s) IV Continuous <Continuous>  folic acid 1 milliGRAM(s) Oral daily  multivitamin 1 Tablet(s) Oral daily  thiamine Injectable 100 milliGRAM(s) IV Push daily    HEMATOLOGIC  Meds: enoxaparin Injectable 40 milliGRAM(s) SubCutaneous every 24 hours    [x] VTE Prophylaxis  INFECTIOUS DISEASES  Meds:   ENDOCRINE  CAPILLARY BLOOD GLUCOSE      POCT Blood Glucose.: 89 mg/dL (05 Apr 2024 05:21)  POCT Blood Glucose.: 102 mg/dL (04 Apr 2024 18:46)  POCT Blood Glucose.: 93 mg/dL (04 Apr 2024 11:08)    Meds:   OTHER MEDICATIONS:  chlorhexidine 2% Cloths 1 Application(s) Topical <User Schedule>  :    LABS:                        10.6   5.58  )-----------( 128      ( 05 Apr 2024 03:10 )             32.5      04-05    138  |  109<H>  |  6<L>  ----------------------------<  81  3.6   |  19<L>  |  0.94    Ca    9.0      05 Apr 2024 03:10  Phos  2.2     04-05  Mg     2.0     04-05    TPro  7.6  /  Alb  2.8<L>  /  TBili  0.5  /  DBili  x   /  AST  145<H>  /  ALT  40  /  AlkPhos  58  04-05      Urinalysis Basic - ( 05 Apr 2024 03:10 )    Color: x / Appearance: x / SG: x / pH: x  Gluc: 81 mg/dL / Ketone: x  / Bili: x / Urobili: x   Blood: x / Protein: x / Nitrite: x   Leuk Esterase: x / RBC: x / WBC x   Sq Epi: x / Non Sq Epi: x / Bacteria: x      Culture Results:   >100,000 CFU/ml Gram Negative Rods (04-03 @ 04:00)  Culture Results:   Normal Respiratory Melanie present (04-03 @ 00:00)  Culture Results:   Growth in aerobic bottle: Staphylococcus epidermidis  Isolation of Coagulase negative Staphylococcus from single blood culture  sets may represent  contamination. Contact the Microbiology Department at [phone] if  susceptibility testing is  clinically indicated.  Direct identification is available within approximately 3-5  hours either by Blood Panel Multiplexed PCR or Direct  MALDI-TOF. Details: [email or web address] (04-02 @ 23:38)  Culture Results:   No growth at 24 hours (04-02 @ 23:25)      RADIOLOGY & ADDITIONAL STUDIES:    
Patient is a 58y old  Male who presents with a chief complaint of ETOH withdrawal (01 Apr 2024 12:35)      BRIEF HOSPITAL COURSE: 59 y/o male from home w/ PMH of ETOH use, gastritis, HLD and multiple admissions for ETOH withdrawal and Dts p/w abdominal pain to ED. 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 Pt was intoxicated with etoh level of 433 in ED and admitted to medicine with CIWA for etoh intox and abd pain from suspected etoh induced pancreatitis. On the floors patient required multiple doses of ativan prompting ICU consultation. Taken to ICU for severe etoh withdrawal. Loaded with phenobarb.     Events last 24 hours: Patient continues to have intermittent agitation and combativeness. Another episode tonight. Given phenobarb 130mg with little effect. Restarted on precedex and given haldol 5mg IVP with good effect. patient unable to provide meaningful history.     PAST MEDICAL & SURGICAL HISTORY:  PUD (peptic ulcer disease)      EtOH dependence      Gastritis      Elevated lipase      No significant past surgical history          Review of Systems:  unable to obtain       Medications:        dexMEDEtomidine Infusion 1.4 MICROgram(s)/kG/Hr IV Continuous <Continuous>  ondansetron Injectable 4 milliGRAM(s) IV Push every 8 hours PRN  PHENobarbital Injectable 130 milliGRAM(s) IV Push every 1 hour PRN      enoxaparin Injectable 40 milliGRAM(s) SubCutaneous every 24 hours    aluminum hydroxide/magnesium hydroxide/simethicone Suspension 30 milliLiter(s) Oral every 4 hours PRN  pantoprazole    Tablet 40 milliGRAM(s) Oral before breakfast  polyethylene glycol 3350 17 Gram(s) Oral daily  senna 2 Tablet(s) Oral at bedtime        dextrose 5% + lactated ringers. 1000 milliLiter(s) IV Continuous <Continuous>  folic acid 1 milliGRAM(s) Oral daily  multivitamin 1 Tablet(s) Oral daily  thiamine IVPB 500 milliGRAM(s) IV Intermittent every 8 hours      chlorhexidine 2% Cloths 1 Application(s) Topical <User Schedule>            ICU Vital Signs Last 24 Hrs  T(C): 36.8 (01 Apr 2024 19:37), Max: 36.8 (01 Apr 2024 19:37)  T(F): 98.2 (01 Apr 2024 19:37), Max: 98.2 (01 Apr 2024 19:37)  HR: 82 (01 Apr 2024 22:00) (60 - 127)  BP: 154/109 (01 Apr 2024 22:00) (79/62 - 154/109)  BP(mean): 122 (01 Apr 2024 22:00) (69 - 122)  ABP: --  ABP(mean): --  RR: 21 (01 Apr 2024 22:00) (11 - 23)  SpO2: 99% (01 Apr 2024 22:00) (96% - 100%)    O2 Parameters below as of 01 Apr 2024 19:37  Patient On (Oxygen Delivery Method): room air                I&O's Detail    31 Mar 2024 07:01  -  01 Apr 2024 07:00  --------------------------------------------------------  IN:    dextrose 5% + lactated ringers: 300 mL    Oral Fluid: 500 mL  Total IN: 800 mL    OUT:    Voided (mL): 2100 mL  Total OUT: 2100 mL    Total NET: -1300 mL      01 Apr 2024 07:01  -  01 Apr 2024 22:47  --------------------------------------------------------  IN:    Dexmedetomidine: 1.4 mL    dextrose 5% + lactated ringers: 1125 mL    IV PiggyBack: 200 mL  Total IN: 1326.4 mL    OUT:    Intermittent Catheterization - Urethral (mL): 800 mL    Voided (mL): 750 mL  Total OUT: 1550 mL    Total NET: -223.6 mL            LABS:                        11.2   3.56  )-----------( 103      ( 01 Apr 2024 04:20 )             34.4     04-01    136  |  109<H>  |  4<L>  ----------------------------<  111<H>  3.9   |  22  |  0.95    Ca    8.7      01 Apr 2024 03:16  Phos  2.5     04-01  Mg     1.6     04-01    TPro  7.3  /  Alb  3.2<L>  /  TBili  1.2  /  DBili  x   /  AST  66<H>  /  ALT  34  /  AlkPhos  59  04-01          CAPILLARY BLOOD GLUCOSE      POCT Blood Glucose.: 88 mg/dL (01 Apr 2024 17:27)      Urinalysis Basic - ( 01 Apr 2024 03:16 )    Color: x / Appearance: x / SG: x / pH: x  Gluc: 111 mg/dL / Ketone: x  / Bili: x / Urobili: x   Blood: x / Protein: x / Nitrite: x   Leuk Esterase: x / RBC: x / WBC x   Sq Epi: x / Non Sq Epi: x / Bacteria: x      CULTURES:      Physical Examination:    General: agitated, combative trying to get out of bed    HEENT: Pupils equal, reactive to light.  Symmetric.    PULM: Clear to auscultation bilaterally    CVS: tachycardia, regular rhythm     ABD: Soft, nondistended, nontender    EXT: No edema, nontender    SKIN: Warm     NEURO: A&Ox0, delirious, tremulous       CRITICAL CARE TIME SPENT: 43 minutes assessing presenting problems of acute illness, which pose high probability of life threatening deterioration or end organ damage/dysfunction, as well as medical decision making including initiating plan of care, reviewing data, reviewing radiologic exams, discussing with multidisciplinary team,  discussing goals of care with patient/family, and writing this note.  Non-inclusive of procedures performed.    Date of Entry of this note is equal to the date of services rendered    
Patient is a 58y old  Male who presents with a chief complaint of ETOH withdrawal (31 Mar 2024 18:31)      INTERVAL HPI/OVERNIGHT EVENTS:  Pt was seen and examined no acute events.      MEDICATIONS  (STANDING):  chlorhexidine 2% Cloths 1 Application(s) Topical <User Schedule>  dextrose 5% + lactated ringers. 1000 milliLiter(s) (75 mL/Hr) IV Continuous <Continuous>  enoxaparin Injectable 40 milliGRAM(s) SubCutaneous every 24 hours  folic acid 1 milliGRAM(s) Oral daily  multivitamin 1 Tablet(s) Oral daily  PHENobarbital IVPB 390 milliGRAM(s) IV Intermittent every 3 hours  polyethylene glycol 3350 17 Gram(s) Oral daily  senna 2 Tablet(s) Oral at bedtime  thiamine 100 milliGRAM(s) Oral daily    MEDICATIONS  (PRN):  aluminum hydroxide/magnesium hydroxide/simethicone Suspension 30 milliLiter(s) Oral every 4 hours PRN Dyspepsia  ondansetron Injectable 4 milliGRAM(s) IV Push every 8 hours PRN Nausea and/or Vomiting  PHENobarbital Injectable 130 milliGRAM(s) IV Push every 1 hour PRN agitation/breakthrough withdrawal sx      Allergies    No Known Allergies    Intolerances          Vital Signs Last 24 Hrs  T(C): 36.3 (31 Mar 2024 18:50), Max: 37 (30 Mar 2024 23:32)  T(F): 97.3 (31 Mar 2024 18:50), Max: 98.6 (30 Mar 2024 23:32)  HR: 80 (31 Mar 2024 21:00) (66 - 99)  BP: 155/115 (31 Mar 2024 21:00) (115/73 - 175/101)  BP(mean): 128 (31 Mar 2024 21:00) (112 - 128)  RR: 14 (31 Mar 2024 21:00) (14 - 21)  SpO2: 98% (31 Mar 2024 21:00) (97% - 100%)    Parameters below as of 31 Mar 2024 20:00  Patient On (Oxygen Delivery Method): room air        PHYSICAL EXAM:  GENERAL: NAD  HEAD:  Atraumatic  EYES: PERRLA  ENMT: Moist mouth   NECK: Supple  NERVOUS SYSTEM:  Awake, alert  CHEST/LUNG: Clear  HEART: RRR, S1, S2  ABDOMEN: Soft, nontender  EXTREMITIES:  No edema BL LE  SKIN: No rash        LABS:              CAPILLARY BLOOD GLUCOSE          RADIOLOGY & ADDITIONAL TESTS:    Imaging Personally Reviewed:  [ ] YES  [ ] NO    Consultant(s) Notes Reviewed:  [ ] YES  [ ] NO    Care Discussed with Consultants/Other Providers [ ] YES  [ ] NO
Progress Note    VANDANA VILLA 58y (1965) Male [id number]  03-30-24 (5d)      Chief Complaint: ETOH withdrawal    Subjective:  Febrile overnight. Received tylenol. Patient starting to speak more English, but persistently disoriented and not able to answer my ROS questions.    Review of Systems: Unable to obtain    PAST MEDICAL & SURGICAL HISTORY:  Alcohol abuse [F10.10]  PUD (peptic ulcer disease) [K27.9]  Mixed hyperlipidemia [E78.2]  EtOH dependence [F10.20]  Gastritis [K29.70]  Substance abuse [F19.10]  DTs (delirium tremens) [F10.231]  HTN (hypertension) [I10]  Elevated lipase [R74.8]    No significant past surgical history [[id number]]      albuterol/ipratropium for Nebulization 3 milliLiter(s) Nebulizer every 6 hours  aluminum hydroxide/magnesium hydroxide/simethicone Suspension 30 milliLiter(s) Oral every 4 hours PRN  cefTRIAXone   IVPB 1000 milliGRAM(s) IV Intermittent every 24 hours  cefTRIAXone   IVPB      chlorhexidine 2% Cloths 1 Application(s) Topical <User Schedule>  dextrose 5% + lactated ringers. 1000 milliLiter(s) IV Continuous <Continuous>  enoxaparin Injectable 40 milliGRAM(s) SubCutaneous every 24 hours  folic acid 1 milliGRAM(s) Oral daily  multivitamin 1 Tablet(s) Oral daily  ondansetron Injectable 4 milliGRAM(s) IV Push every 8 hours PRN  pantoprazole  Injectable 40 milliGRAM(s) IV Push daily  polyethylene glycol 3350 17 Gram(s) Oral daily  senna 2 Tablet(s) Oral at bedtime  sodium chloride 3%  Inhalation 4 milliLiter(s) Inhalation every 6 hours  thiamine Injectable 100 milliGRAM(s) IV Push daily    Objective:  T(C): 37 (04-04-24 @ 08:00), Max: 39 (04-03-24 @ 19:07)  HR: 99 (04-04-24 @ 09:00) (98 - 128)  BP: 139/92 (04-04-24 @ 09:00) (118/90 - 162/94)  RR: 10 (04-04-24 @ 09:00) (10 - 26)  SpO2: 98% (04-04-24 @ 09:00) (95% - 100%)    Physical exam:  GENERAL: NAD, comfortable. Moving the legs off the bed.   HEAD:  Atraumatic, Normocephalic  EYES: conjunctiva and sclera clear  NECK: Supple, No JVD  CHEST/LUNG: Clear to auscultation bilaterally; No wheeze  HEART: Regular rate and rhythm; No murmurs, rubs, or gallops  ABDOMEN: Soft, Nontender, Bowel sounds present  EXTREMITIES:  2+ Peripheral Pulses, No clubbing, cyanosis, or edema      04-03-24 @ 07:01  -  04-04-24 @ 07:00  --------------------------------------------------------  IN: 2525 mL / OUT: 1500 mL / NET: 1025 mL    04-04-24 @ 07:01  -  04-04-24 @ 10:10  --------------------------------------------------------  IN: 725 mL / OUT: 0 mL / NET: 725 mL        CAPILLARY BLOOD GLUCOSE      (04-04 @ 02:20)                      10.8  4.55 )-----------( 110                 32.7    Neutrophils = 3.46 (76.1%)  Lymphocytes = 0.56 (12.3%)  Eosinophils = 0.01 (0.2%)  Basophils = 0.02 (0.4%)  Monocytes = 0.49 (10.8%)  Bands = --%    04-04    139  |  108  |  5<L>  ----------------------------<  116<H>  3.3<L>   |  20<L>  |  0.67    Ca    8.3<L>      04 Apr 2024 02:20  Phos  2.7     04-04  Mg     1.8     04-04    TPro  7.2  /  Alb  2.7<L>  /  TBili  0.7  /  DBili  x   /  AST  56<H>  /  ALT  28  /  AlkPhos  50  04-04          RVP:(04-03 @ 04:00)  NotDete      Urinalysis Basic - ( 04 Apr 2024 02:20 )    Color: x / Appearance: x / SG: x / pH: x  Gluc: 116 mg/dL / Ketone: x  / Bili: x / Urobili: x   Blood: x / Protein: x / Nitrite: x   Leuk Esterase: x / RBC: x / WBC x   Sq Epi: x / Non Sq Epi: x / Bacteria: x        WBC Trend: 4.55<--, 5.19<--, 5.93<--    Hb Trend: 10.8<--, 11.2<--, 11.6<--, 11.9<--, 11.2<--  
INTERVAL HPI/OVERNIGHT EVENTS:      More alert today, speaking in full sentences.     CENTRAL LINE: [ x] YES [ ] NO  LOCATION:  R Fem 4/6/2024 to be removed    CHAN: [ ] YES [ ] NO        A-LINE:  [ ] YES [ ] NO  LOCATION:       GLOBAL ISSUE/BEST PRACTICE:  Analgesia:   Sedation:dexMEDEtomidine Infusion  HOB elevation: yes  Stress ulcer prophylaxis: pantoprazole  Injectable    VTE prophylaxis: enoxaparin Injectable 40 milliGRAM(s) SubCutaneous every 24 hours    Oral Care: Chlorhexidine  Glycemic control:   Nutrition:    REVIEW OF SYSTEMS:  [x ] Unable to obtain because: alcohol withdrawal    PHYSICAL EXAM:    GENERAL: Sedated off, awake and alert  HEENT: NCAT, EOMI, PERRLA, conjunctiva and sclera clear; Moist mucous membranes  NECK: Supple  CHEST/LUNG: CTABL; No rales, rhonchi, wheezing, or rubs  HEART: S1S2, RRR, No murmurs, rubs, or gallops  ABDOMEN: Soft, Nontender, Nondistended; Bowel sounds present  EXTREMITIES:  2+ Peripheral Pulses, No clubbing, cyanosis, or edema  NERVOUS SYSTEM:  Moving all ext, awake and alert.      ICU Vital Signs Last 24 Hrs  T(C): 36.1 (07 Apr 2024 08:05), Max: 37.3 (06 Apr 2024 20:00)  T(F): 97 (07 Apr 2024 08:05), Max: 99.2 (06 Apr 2024 20:00)  HR: 78 (07 Apr 2024 08:05) (60 - 95)  BP: 141/82 (07 Apr 2024 08:05) (105/61 - 184/114)  BP(mean): 98 (07 Apr 2024 08:05) (74 - 136)  ABP: --  ABP(mean): --  RR: 13 (07 Apr 2024 08:05) (11 - 25)  SpO2: 97% (07 Apr 2024 08:05) (95% - 100%)    O2 Parameters below as of 07 Apr 2024 07:00  Patient On (Oxygen Delivery Method): nasal cannula  O2 Flow (L/min): 2        I&O's Detail    06 Apr 2024 07:01  -  07 Apr 2024 07:00  --------------------------------------------------------  IN:    Dexmedetomidine: 55.2 mL    dextrose 5% + lactated ringers: 1800 mL    IV PiggyBack: 300 mL    IV PiggyBack: 100 mL    Oral Fluid: 1150 mL  Total IN: 3405.2 mL    OUT:    Intermittent Catheterization - Urethral (mL): 600 mL    Voided (mL): 580 mL  Total OUT: 1180 mL    Total NET: 2225.2 mL      07 Apr 2024 07:01  -  07 Apr 2024 09:30  --------------------------------------------------------  IN:    dextrose 5% + lactated ringers: 75 mL    IV PiggyBack: 100 mL  Total IN: 175 mL    OUT:  Total OUT: 0 mL    Total NET: 175 mL        MEDICATIONS  NEURO  Meds: ondansetron Injectable 4 milliGRAM(s) IV Push every 8 hours PRN Nausea and/or Vomiting  PHENobarbital Injectable 130 milliGRAM(s) IV Push every 1 hour PRN agitation/breakthrough withdrawal sx    RESPIRATORY    Meds: albuterol/ipratropium for Nebulization 3 milliLiter(s) Nebulizer every 6 hours PRN Shortness of Breath and/or Wheezing    CARDIOVASCULAR  Meds:   GI/NUTRITION  Meds: aluminum hydroxide/magnesium hydroxide/simethicone Suspension 30 milliLiter(s) Oral every 4 hours PRN Dyspepsia  pantoprazole  Injectable 40 milliGRAM(s) IV Push daily  polyethylene glycol 3350 17 Gram(s) Oral daily  senna 2 Tablet(s) Oral at bedtime    GENITOURINARY  Meds: folic acid 1 milliGRAM(s) Oral daily  multivitamin 1 Tablet(s) Oral daily  sodium chloride 0.9% lock flush 10 milliLiter(s) IV Push every 1 hour PRN Pre/post blood products, medications, blood draw, and to maintain line patency  thiamine Injectable 100 milliGRAM(s) IV Push daily    HEMATOLOGIC  Meds: enoxaparin Injectable 40 milliGRAM(s) SubCutaneous every 24 hours    [x] VTE Prophylaxis  INFECTIOUS DISEASES  Meds: piperacillin/tazobactam IVPB.. 3.375 Gram(s) IV Intermittent every 8 hours    ENDOCRINE  CAPILLARY BLOOD GLUCOSE      POCT Blood Glucose.: 93 mg/dL (07 Apr 2024 05:34)  POCT Blood Glucose.: 82 mg/dL (07 Apr 2024 00:43)  POCT Blood Glucose.: 81 mg/dL (06 Apr 2024 17:23)  POCT Blood Glucose.: 118 mg/dL (06 Apr 2024 11:16)    Meds:   OTHER MEDICATIONS:  chlorhexidine 2% Cloths 1 Application(s) Topical <User Schedule>  chlorhexidine 2% Cloths 1 Application(s) Topical <User Schedule>  :    LABS:                        9.7    2.53  )-----------( 269      ( 07 Apr 2024 05:20 )             30.2      04-07    137  |  106  |  7   ----------------------------<  90  3.4<L>   |  25  |  0.82    Ca    8.7      07 Apr 2024 05:20  Phos  3.6     04-07  Mg     2.1     04-07    TPro  7.1  /  Alb  2.7<L>  /  TBili  0.3  /  DBili  x   /  AST  265<H>  /  ALT  67  /  AlkPhos  45  04-07      Urinalysis Basic - ( 07 Apr 2024 05:20 )    Color: x / Appearance: x / SG: x / pH: x  Gluc: 90 mg/dL / Ketone: x  / Bili: x / Urobili: x   Blood: x / Protein: x / Nitrite: x   Leuk Esterase: x / RBC: x / WBC x   Sq Epi: x / Non Sq Epi: x / Bacteria: x    Culture Results:   Growth in anaerobic bottle: Gram Positive Cocci in Clusters (04-04 @ 08:45); REPEAT PENDING    
Progress Note    VANDANA VILLA 58y (1965) Male [id number]  03-30-24 (3d)      Chief Complaint: INTOX        Subjective:  Required additional phenobarbital overnight.     Review of Systems: Unable to obtain    PAST MEDICAL & SURGICAL HISTORY:  Alcohol abuse [F10.10]    PUD (peptic ulcer disease) [K27.9]    Mixed hyperlipidemia [E78.2]    EtOH dependence [F10.20]    Gastritis [K29.70]    Substance abuse [F19.10]    DTs (delirium tremens) [F10.231]    HTN (hypertension) [I10]    Elevated lipase [R74.8]    No significant past surgical history [[id number]]      aluminum hydroxide/magnesium hydroxide/simethicone Suspension 30 milliLiter(s) Oral every 4 hours PRN  chlorhexidine 2% Cloths 1 Application(s) Topical <User Schedule>  dexMEDEtomidine Infusion 1.4 MICROgram(s)/kG/Hr IV Continuous <Continuous>  dextrose 5% + lactated ringers. 1000 milliLiter(s) IV Continuous <Continuous>  enoxaparin Injectable 40 milliGRAM(s) SubCutaneous every 24 hours  folic acid 1 milliGRAM(s) Oral daily  multivitamin 1 Tablet(s) Oral daily  ondansetron Injectable 4 milliGRAM(s) IV Push every 8 hours PRN  pantoprazole    Tablet 40 milliGRAM(s) Oral before breakfast  PHENobarbital Injectable 130 milliGRAM(s) IV Push every 1 hour PRN  polyethylene glycol 3350 17 Gram(s) Oral daily  senna 2 Tablet(s) Oral at bedtime  thiamine IVPB 500 milliGRAM(s) IV Intermittent every 8 hours    Objective:  T(C): 36 (04-02-24 @ 05:03), Max: 36.8 (04-01-24 @ 19:37)  HR: 96 (04-02-24 @ 12:00) (62 - 127)  BP: 150/89 (04-02-24 @ 12:00) (75/60 - 172/115)  RR: 14 (04-02-24 @ 12:00) (11 - 27)  SpO2: 99% (04-02-24 @ 12:00) (96% - 100%)    Physical exam:  GENERAL: NAD, comfortable  HEAD:  Atraumatic, Normocephalic  EYES: conjunctiva and sclera clear  NECK: Supple, No JVD  CHEST/LUNG: Clear to auscultation bilaterally; No wheeze  HEART: Regular rate and rhythm; No murmurs, rubs, or gallops  ABDOMEN: Soft, Nontender, Bowel sounds present  EXTREMITIES:  2+ Peripheral Pulses, No clubbing, cyanosis, or edema      04-01-24 @ 07:01  -  04-02-24 @ 07:00  --------------------------------------------------------  IN: 1857.7 mL / OUT: 2650 mL / NET: -792.3 mL        CAPILLARY BLOOD GLUCOSE      (04-02 @ 04:30)                      11.9  2.88 )-----------( 116                 37.5    Neutrophils = 0.93 (32.2%)  Lymphocytes = 1.35 (46.9%)  Eosinophils = 0.23 (8.0%)  Basophils = 0.02 (0.7%)  Monocytes = 0.35 (12.2%)  Bands = --%    04-02    141  |  112<H>  |  2<L>  ----------------------------<  112<H>  3.6   |  21<L>  |  0.84    Ca    8.7      02 Apr 2024 04:30  Phos  2.5     04-02  Mg     1.9     04-02    TPro  7.4  /  Alb  3.2<L>  /  TBili  0.6  /  DBili  x   /  AST  48<H>  /  ALT  30  /  AlkPhos  59  04-02    Urinalysis Basic - ( 02 Apr 2024 04:30 )    Color: x / Appearance: x / SG: x / pH: x  Gluc: 112 mg/dL / Ketone: x  / Bili: x / Urobili: x   Blood: x / Protein: x / Nitrite: x   Leuk Esterase: x / RBC: x / WBC x   Sq Epi: x / Non Sq Epi: x / Bacteria: x        WBC Trend: 2.88<--, 3.56<--, 3.06<--    Hb Trend: 11.9<--, 11.2<--, 13.7<--, 12.2<--  
Progress Note    VANDANA VILLA 58y (1965) Male [id number]  03-30-24 (2d)      Chief Complaint: ETOH withdrawal    Subjective:  Patient got loaded with phenobarbital last night.    Review of Systems: Unable to obtain    PAST MEDICAL & SURGICAL HISTORY:  Alcohol abuse [F10.10]  PUD (peptic ulcer disease) [K27.9]  Mixed hyperlipidemia [E78.2]  EtOH dependence [F10.20]  Gastritis [K29.70]  Substance abuse [F19.10]  DTs (delirium tremens) [F10.231]  HTN (hypertension) [I10]  Elevated lipase [R74.8]  No significant past surgical history [[id number]]      aluminum hydroxide/magnesium hydroxide/simethicone Suspension 30 milliLiter(s) Oral every 4 hours PRN  chlorhexidine 2% Cloths 1 Application(s) Topical <User Schedule>  dexMEDEtomidine Infusion 1.4 MICROgram(s)/kG/Hr IV Continuous <Continuous>  dextrose 5% + lactated ringers. 1000 milliLiter(s) IV Continuous <Continuous>  enoxaparin Injectable 40 milliGRAM(s) SubCutaneous every 24 hours  folic acid 1 milliGRAM(s) Oral daily  multivitamin 1 Tablet(s) Oral daily  ondansetron Injectable 4 milliGRAM(s) IV Push every 8 hours PRN  pantoprazole    Tablet 40 milliGRAM(s) Oral before breakfast  PHENobarbital Injectable 130 milliGRAM(s) IV Push once  PHENobarbital Injectable 130 milliGRAM(s) IV Push every 1 hour PRN  polyethylene glycol 3350 17 Gram(s) Oral daily  senna 2 Tablet(s) Oral at bedtime  thiamine IVPB 500 milliGRAM(s) IV Intermittent every 8 hours    Objective:  T(C): 34.2 (04-01-24 @ 11:00), Max: 36.7 (03-31-24 @ 17:13)  HR: 62 (04-01-24 @ 11:00) (60 - 99)  BP: 88/70 (04-01-24 @ 11:00) (79/62 - 175/101)  RR: 11 (04-01-24 @ 11:00) (11 - 21)  SpO2: 100% (04-01-24 @ 11:00) (96% - 100%)    Physical exam:  GENERAL: NAD, comfortable  HEAD:  Atraumatic, Normocephalic  EYES: conjunctiva and sclera clear  NECK: Supple, No JVD  CHEST/LUNG: Clear to auscultation bilaterally; No wheeze  HEART: Regular rate and rhythm; No murmurs, rubs, or gallops  ABDOMEN: Soft, Nontender, Bowel sounds present  EXTREMITIES:  2+ Peripheral Pulses, No clubbing, cyanosis, or edema      03-31-24 @ 07:01  -  04-01-24 @ 07:00  --------------------------------------------------------  IN: 800 mL / OUT: 2100 mL / NET: -1300 mL    04-01-24 @ 07:01  -  04-01-24 @ 12:35  --------------------------------------------------------  IN: 375 mL / OUT: 1000 mL / NET: -625 mL        CAPILLARY BLOOD GLUCOSE      (04-01 @ 04:20)                      11.2  3.56 )-----------( 103                 34.4    Neutrophils = -- (--%)  Lymphocytes = -- (--%)  Eosinophils = -- (--%)  Basophils = -- (--%)  Monocytes = -- (--%)  Bands = --%    04-01    136  |  109<H>  |  4<L>  ----------------------------<  111<H>  3.9   |  22  |  0.95    Ca    8.7      01 Apr 2024 03:16  Phos  2.5     04-01  Mg     1.6     04-01    TPro  7.3  /  Alb  3.2<L>  /  TBili  1.2  /  DBili  x   /  AST  66<H>  /  ALT  34  /  AlkPhos  59  04-01        Urinalysis Basic - ( 01 Apr 2024 03:16 )    Color: x / Appearance: x / SG: x / pH: x  Gluc: 111 mg/dL / Ketone: x  / Bili: x / Urobili: x   Blood: x / Protein: x / Nitrite: x   Leuk Esterase: x / RBC: x / WBC x   Sq Epi: x / Non Sq Epi: x / Bacteria: x        WBC Trend: 3.56<--, 3.06<--, 3.44<--    Hb Trend: 11.2<--, 13.7<--, 12.2<--        New imaging in last 24 hours:  Consult notes reviewed:

## 2024-04-07 NOTE — PROGRESS NOTE ADULT - REASON FOR ADMISSION
ETOH withdrawal

## 2024-04-08 ENCOUNTER — TRANSCRIPTION ENCOUNTER (OUTPATIENT)
Age: 59
End: 2024-04-08

## 2024-04-08 VITALS — RESPIRATION RATE: 16 BRPM | SYSTOLIC BLOOD PRESSURE: 127 MMHG | DIASTOLIC BLOOD PRESSURE: 83 MMHG | HEART RATE: 80 BPM

## 2024-04-08 LAB
ALBUMIN SERPL ELPH-MCNC: 2.7 G/DL — LOW (ref 3.3–5)
ALP SERPL-CCNC: 43 U/L — SIGNIFICANT CHANGE UP (ref 40–120)
ALT FLD-CCNC: 74 U/L — SIGNIFICANT CHANGE UP (ref 12–78)
ANION GAP SERPL CALC-SCNC: 8 MMOL/L — SIGNIFICANT CHANGE UP (ref 5–17)
ANISOCYTOSIS BLD QL: SLIGHT — SIGNIFICANT CHANGE UP
AST SERPL-CCNC: 233 U/L — HIGH (ref 15–37)
BASOPHILS # BLD AUTO: 0 K/UL — SIGNIFICANT CHANGE UP (ref 0–0.2)
BASOPHILS NFR BLD AUTO: 0 % — SIGNIFICANT CHANGE UP (ref 0–2)
BILIRUB SERPL-MCNC: 0.2 MG/DL — SIGNIFICANT CHANGE UP (ref 0.2–1.2)
BUN SERPL-MCNC: 9 MG/DL — SIGNIFICANT CHANGE UP (ref 7–23)
CALCIUM SERPL-MCNC: 8.8 MG/DL — SIGNIFICANT CHANGE UP (ref 8.5–10.1)
CHLORIDE SERPL-SCNC: 107 MMOL/L — SIGNIFICANT CHANGE UP (ref 96–108)
CO2 SERPL-SCNC: 21 MMOL/L — LOW (ref 22–31)
CREAT SERPL-MCNC: 0.8 MG/DL — SIGNIFICANT CHANGE UP (ref 0.5–1.3)
CULTURE RESULTS: SIGNIFICANT CHANGE UP
EGFR: 104 ML/MIN/1.73M2 — SIGNIFICANT CHANGE UP
EOSINOPHIL # BLD AUTO: 0.08 K/UL — SIGNIFICANT CHANGE UP (ref 0–0.5)
EOSINOPHIL NFR BLD AUTO: 3 % — SIGNIFICANT CHANGE UP (ref 0–6)
GLUCOSE SERPL-MCNC: 89 MG/DL — SIGNIFICANT CHANGE UP (ref 70–99)
HCT VFR BLD CALC: 31.5 % — LOW (ref 39–50)
HGB BLD-MCNC: 10.1 G/DL — LOW (ref 13–17)
HYPOCHROMIA BLD QL: SLIGHT — SIGNIFICANT CHANGE UP
LYMPHOCYTES # BLD AUTO: 1.56 K/UL — SIGNIFICANT CHANGE UP (ref 1–3.3)
LYMPHOCYTES # BLD AUTO: 56 % — HIGH (ref 13–44)
MAGNESIUM SERPL-MCNC: 2 MG/DL — SIGNIFICANT CHANGE UP (ref 1.6–2.6)
MANUAL SMEAR VERIFICATION: SIGNIFICANT CHANGE UP
MCHC RBC-ENTMCNC: 24.5 PG — LOW (ref 27–34)
MCHC RBC-ENTMCNC: 32.1 G/DL — SIGNIFICANT CHANGE UP (ref 32–36)
MCV RBC AUTO: 76.5 FL — LOW (ref 80–100)
METAMYELOCYTES # FLD: 1 % — HIGH (ref 0–0)
MICROCYTES BLD QL: SLIGHT — SIGNIFICANT CHANGE UP
MONOCYTES # BLD AUTO: 0.33 K/UL — SIGNIFICANT CHANGE UP (ref 0–0.9)
MONOCYTES NFR BLD AUTO: 12 % — SIGNIFICANT CHANGE UP (ref 2–14)
NEUTROPHILS # BLD AUTO: 0.73 K/UL — LOW (ref 1.8–7.4)
NEUTROPHILS NFR BLD AUTO: 26 % — LOW (ref 43–77)
NRBC # BLD: 0 /100 WBCS — SIGNIFICANT CHANGE UP (ref 0–0)
NRBC # BLD: SIGNIFICANT CHANGE UP /100 WBCS (ref 0–0)
OVALOCYTES BLD QL SMEAR: SLIGHT — SIGNIFICANT CHANGE UP
PHOSPHATE SERPL-MCNC: 3 MG/DL — SIGNIFICANT CHANGE UP (ref 2.5–4.5)
PLAT MORPH BLD: NORMAL — SIGNIFICANT CHANGE UP
PLATELET # BLD AUTO: 322 K/UL — SIGNIFICANT CHANGE UP (ref 150–400)
POLYCHROMASIA BLD QL SMEAR: SLIGHT — SIGNIFICANT CHANGE UP
POTASSIUM SERPL-MCNC: 4.4 MMOL/L — SIGNIFICANT CHANGE UP (ref 3.5–5.3)
POTASSIUM SERPL-SCNC: 4.4 MMOL/L — SIGNIFICANT CHANGE UP (ref 3.5–5.3)
PROT SERPL-MCNC: 7.2 GM/DL — SIGNIFICANT CHANGE UP (ref 6–8.3)
RBC # BLD: 4.12 M/UL — LOW (ref 4.2–5.8)
RBC # FLD: 22.7 % — HIGH (ref 10.3–14.5)
RBC BLD AUTO: ABNORMAL
SODIUM SERPL-SCNC: 136 MMOL/L — SIGNIFICANT CHANGE UP (ref 135–145)
SPECIMEN SOURCE: SIGNIFICANT CHANGE UP
VARIANT LYMPHS # BLD: 2 % — SIGNIFICANT CHANGE UP (ref 0–6)
WBC # BLD: 2.79 K/UL — LOW (ref 3.8–10.5)
WBC # FLD AUTO: 2.79 K/UL — LOW (ref 3.8–10.5)

## 2024-04-08 PROCEDURE — 99239 HOSP IP/OBS DSCHRG MGMT >30: CPT

## 2024-04-08 PROCEDURE — 99222 1ST HOSP IP/OBS MODERATE 55: CPT | Mod: 25

## 2024-04-08 RX ORDER — LOPERAMIDE HCL 2 MG
2 TABLET ORAL
Refills: 0 | Status: DISCONTINUED | OUTPATIENT
Start: 2024-04-08 | End: 2024-04-08

## 2024-04-08 RX ORDER — ACETAMINOPHEN 500 MG
1000 TABLET ORAL ONCE
Refills: 0 | Status: COMPLETED | OUTPATIENT
Start: 2024-04-08 | End: 2024-04-08

## 2024-04-08 RX ADMIN — PIPERACILLIN AND TAZOBACTAM 25 GRAM(S): 4; .5 INJECTION, POWDER, LYOPHILIZED, FOR SOLUTION INTRAVENOUS at 13:28

## 2024-04-08 RX ADMIN — Medication 2 MILLIGRAM(S): at 15:08

## 2024-04-08 RX ADMIN — PIPERACILLIN AND TAZOBACTAM 25 GRAM(S): 4; .5 INJECTION, POWDER, LYOPHILIZED, FOR SOLUTION INTRAVENOUS at 05:48

## 2024-04-08 RX ADMIN — Medication 100 MILLIGRAM(S): at 11:43

## 2024-04-08 RX ADMIN — Medication 400 MILLIGRAM(S): at 01:50

## 2024-04-08 RX ADMIN — Medication 1000 MILLIGRAM(S): at 02:50

## 2024-04-08 RX ADMIN — Medication 1 MILLIGRAM(S): at 11:42

## 2024-04-08 RX ADMIN — CHLORHEXIDINE GLUCONATE 1 APPLICATION(S): 213 SOLUTION TOPICAL at 07:32

## 2024-04-08 RX ADMIN — Medication 2 MILLIGRAM(S): at 17:08

## 2024-04-08 RX ADMIN — CHLORHEXIDINE GLUCONATE 1 APPLICATION(S): 213 SOLUTION TOPICAL at 05:48

## 2024-04-08 RX ADMIN — Medication 1 TABLET(S): at 11:42

## 2024-04-08 NOTE — DISCHARGE NOTE NURSING/CASE MANAGEMENT/SOCIAL WORK - PATIENT PORTAL LINK FT
You can access the FollowMyHealth Patient Portal offered by [facility] by registering at the following website: [email or web address]. By joining [employer]’s FollowMyHealth portal, you will also be able to view your health information using other applications (apps) compatible with our system.

## 2024-04-08 NOTE — DISCHARGE NOTE PROVIDER - NSDCHC_MEDRECSTATUS_GEN_ALL_CORE
Needs to schedule BP check   Admission Reconciliation is Completed  Discharge Reconciliation is Completed

## 2024-04-08 NOTE — CHART NOTE - NSCHARTNOTEFT_GEN_A_CORE
MICU DOWN GRADE NOTE  Admitting attending: Dr. Pearl   Case discussed Dr. Back     Patient is a 58y old  Male who presents with a chief complaint of ETOH withdrawal (07 Apr 2024 09:29)    HPI:  Mr. Degroot is a 59 y/o male from home w/ PMH of ETOH use, gastritis, HLD and multiple admissions for ETOH withdrawal p/w abdominal pain. Patient reports that he has been having significant epigastric abdominal pain, described as burning sensation and is not able to keep anything down. He reports having EGD recently at [address] [place] [address] which happens to be [facility], though no documentation per chart review. His last EGD per charts was 2-3 yrs ago which showed esophageal ulcer. Patient denies any hematemesis coffee  ground emesis or other complaints. He reports drinking "red wine" and black label- whiskey- 1-2 shots every 3-4 days and says that his drink was 4 days ago.   Of note, per chart review it appears that patient drinks daily vodka and whiskey, he was well known to ED provider and RN. He has had multiple admissions at [facility] for ETOH withdrawal requiring phenobarb.     In ED, patient's VS were stable. His CIWA was above 9, was given librium 50mg, valium 10mg x2 and ativan 4mg.  (30 Mar 2024 15:52)    Brief Hospital Course:  Pt with multiple past icu admissions for benzos refractive DTs requiring ICU admission every few months. In ED, pt was intoxicated with etoh level of 433 and admitted to medicine with CIWA for etoh intox and abd pain from suspected etoh induced pancreatitis. On the floors patient required multiple doses of ativan prompting ICU consultation. Taken to ICU for severe etoh withdrawal. Loaded with phenobarb. Intermittently on precedex gtt for agitation now weaned off.     INTERVAL HPI/OVERNIGHT EVENTS:  Pt seen and examined at bedside. Pt more alert today, speaking in full sentences. Sitting in bed eating breakfast. Pt with no further episodes of agitation. Remains off precedex drip.      REVIEW OF SYSTEMS:  All negative other than what is stated above.    MEDICATIONS:  albuterol/ipratropium for Nebulization 3 milliLiter(s) Nebulizer every 6 hours PRN  aluminum hydroxide/magnesium hydroxide/simethicone Suspension 30 milliLiter(s) Oral every 4 hours PRN  chlorhexidine 2% Cloths 1 Application(s) Topical <User Schedule>  chlorhexidine 2% Cloths 1 Application(s) Topical <User Schedule>  enoxaparin Injectable 40 milliGRAM(s) SubCutaneous every 24 hours  folic acid 1 milliGRAM(s) Oral daily  multivitamin 1 Tablet(s) Oral daily  ondansetron Injectable 4 milliGRAM(s) IV Push every 8 hours PRN  PHENobarbital Injectable 130 milliGRAM(s) IV Push every 1 hour PRN  piperacillin/tazobactam IVPB.. 3.375 Gram(s) IV Intermittent every 8 hours  polyethylene glycol 3350 17 Gram(s) Oral daily  senna 2 Tablet(s) Oral at bedtime  sodium chloride 0.9% lock flush 10 milliLiter(s) IV Push every 1 hour PRN  thiamine Injectable 100 milliGRAM(s) IV Push daily      T(C): 36.6 (04-07-24 @ 11:20), Max: 37.3 (04-06-24 @ 20:00)  HR: 85 (04-07-24 @ 12:00) (66 - 95)  BP: 150/95 (04-07-24 @ 12:00) (105/61 - 184/114)  RR: 14 (04-07-24 @ 12:00) (12 - 25)  SpO2: 100% (04-07-24 @ 11:20) (97% - 100%)  Wt(kg): --Vital Signs Last 24 Hrs  T(C): 36.6 (07 Apr 2024 11:20), Max: 37.3 (06 Apr 2024 20:00)  T(F): 97.8 (07 Apr 2024 11:20), Max: 99.2 (06 Apr 2024 20:00)  HR: 85 (07 Apr 2024 12:00) (66 - 95)  BP: 150/95 (07 Apr 2024 12:00) (105/61 - 184/114)  BP(mean): 110 (07 Apr 2024 12:00) (74 - 136)  RR: 14 (07 Apr 2024 12:00) (12 - 25)  SpO2: 100% (07 Apr 2024 11:20) (97% - 100%)    Parameters below as of 07 Apr 2024 11:20  Patient On (Oxygen Delivery Method): room air        PHYSICAL EXAM:  GENERAL: awake and alert on no sedation  HEENT: NCAT, EOMI, PERRLA, conjunctiva and sclera clear; Moist mucous membranes  NECK: Supple  CHEST/LUNG: CTABL; No rales, rhonchi, wheezing, or rubs  HEART: S1S2, RRR, No murmurs, rubs, or gallops  ABDOMEN: Soft, Nontender, Nondistended; Bowel sounds present  EXTREMITIES:  2+ Peripheral Pulses, No clubbing, cyanosis, or edema  NERVOUS SYSTEM:  Moving all ext, awake and alert.    Consultant(s) Notes Reviewed:  [x ] YES  [ ] NO  Care Discussed with Consultants/Other Providers [ x] YES  [ ] NO    LABS:                        9.7    2.53  )-----------( 269      ( 07 Apr 2024 05:20 )             30.2     04-07    137  |  106  |  7   ----------------------------<  90  3.4<L>   |  25  |  0.82    Ca    8.7      07 Apr 2024 05:20  Phos  3.6     04-07  Mg     2.1     04-07    TPro  7.1  /  Alb  2.7<L>  /  TBili  0.3  /  DBili  x   /  AST  265<H>  /  ALT  67  /  AlkPhos  45  04-07      Urinalysis Basic - ( 07 Apr 2024 05:20 )    Color: x / Appearance: x / SG: x / pH: x  Gluc: 90 mg/dL / Ketone: x  / Bili: x / Urobili: x   Blood: x / Protein: x / Nitrite: x   Leuk Esterase: x / RBC: x / WBC x   Sq Epi: x / Non Sq Epi: x / Bacteria: x      CAPILLARY BLOOD GLUCOSE      POCT Blood Glucose.: 98 mg/dL (07 Apr 2024 11:13)  POCT Blood Glucose.: 93 mg/dL (07 Apr 2024 05:34)  POCT Blood Glucose.: 82 mg/dL (07 Apr 2024 00:43)  POCT Blood Glucose.: 81 mg/dL (06 Apr 2024 17:23)        Urinalysis Basic - ( 07 Apr 2024 05:20 )    Color: x / Appearance: x / SG: x / pH: x  Gluc: 90 mg/dL / Ketone: x  / Bili: x / Urobili: x   Blood: x / Protein: x / Nitrite: x   Leuk Esterase: x / RBC: x / WBC x   Sq Epi: x / Non Sq Epi: x / Bacteria: x        RADIOLOGY & ADDITIONAL TESTS:    Imaging Personally Reviewed:  [x ] YES  [ ] NO    To follow up:  57 yo M with ETOH use, gastritis, HLD and multiple admissions for ETOH withdrawal and Dts p/w withdrawal.    Neuro: S/p phenobarbital load. More alert today, only required 1 PRN overnight. Continue to monitor LFTs. Thiamine, Folic acid. Continue to monitor for any withdrawal symptoms. Phenobarb 130mg prn.   CV: MICHELLE  Respiratory: MICHELLE  GI: Diet as tolerated.  Renal: MICHELLE. Monitor UOP.   Endocrine: FS q6hrs while NPO  Heme: Lovenox for DVT ppx. Thrombocytopenia, likely in setting of  liver dysfunction. Stable CTM.  ID: UA+, fever. staph epi in x2 blood culture; empirically being treated with zosyn. F/u repeat Blood cultures.   Ethics: Full code    Pt no longer requires ICU level of care and is hemodynamically stable for downgrade to medicine service. Case discussed with ICU attending, Dr. Lofton and hospitalist.
Per chart pt with PMH: EtOH use, PUD, gastritis, admitted for alcohol withdrawal.     Factors impacting intake: [ ] none [ ] nausea  [ ] vomiting [ ] diarrhea [ ] constipation  [ ]chewing problems [ ] swallowing issues  [x] other: variable appetite    Diet Prescription: Diet, Easy to Chew:   Supplement Feeding Modality:  Oral  Ensure Plus High Protein Cans or Servings Per Day:  1       Frequency:  Two Times a day (04-02-24 @ 11:35) [Active]    Intake: % as per flow sheets; consuming nutritional supplement as well.    Current Weight: (04/07) 76.7kg (03/31) 79.1kg indicating weight loss of 2.4kg  % Weight Change: 3.0% weight loss x 8 days    Edema: 1+ right and left hands as per flow sheets    Pertinent Medications: MEDICATIONS  (STANDING):  chlorhexidine 2% Cloths 1 Application(s) Topical <User Schedule>  chlorhexidine 2% Cloths 1 Application(s) Topical <User Schedule>  enoxaparin Injectable 40 milliGRAM(s) SubCutaneous every 24 hours  folic acid 1 milliGRAM(s) Oral daily  multivitamin 1 Tablet(s) Oral daily  piperacillin/tazobactam IVPB.. 3.375 Gram(s) IV Intermittent every 8 hours  polyethylene glycol 3350 17 Gram(s) Oral daily  senna 2 Tablet(s) Oral at bedtime  thiamine Injectable 100 milliGRAM(s) IV Push daily    MEDICATIONS  (PRN):  albuterol/ipratropium for Nebulization 3 milliLiter(s) Nebulizer every 6 hours PRN Shortness of Breath and/or Wheezing  aluminum hydroxide/magnesium hydroxide/simethicone Suspension 30 milliLiter(s) Oral every 4 hours PRN Dyspepsia  loperamide 2 milliGRAM(s) Oral every 2 hours PRN Diarrhea  melatonin 3 milliGRAM(s) Oral at bedtime PRN Sleep  ondansetron Injectable 4 milliGRAM(s) IV Push every 8 hours PRN Nausea and/or Vomiting  sodium chloride 0.9% lock flush 10 milliLiter(s) IV Push every 1 hour PRN Pre/post blood products, medications, blood draw, and to maintain line patency    Pertinent Labs: 04-08 Na136 mmol/L Glu 89 mg/dL K+ 4.4 mmol/L Cr  0.80 mg/dL BUN 9 mg/dL 04-08 Phos 3.0 mg/dL 04-08 Alb 2.7 g/dL<L>      Skin: no pressure injuries as per flow sheets     Estimated Needs:   [x] no change since previous assessment: 04/02  [ ] recalculated:     Previous Nutrition Diagnosis:   [x] Inadequate Energy Intake  Etiology	Decreased ability to consume sufficient energy  Signs/Symptoms	Alcohol withdrawal; sedation; lethargy  Goal/Expected Outcome	Pt to consume >75% meals/supplements during LOS (not consistently met)      Nutrition Diagnosis is [ ] ongoing  [ ] resolved [ ] not applicable     New Nutrition Diagnosis: [ ] not applicable      Interventions:   Recommend  [x] Continue current diet as ordered above  [ ] Change Diet To:  [ ] Nutrition Supplement  [ ] Nutrition Support  [x] Other: Continue to provide assistance and encouragement with PO intake     Monitoring and Evaluation:   [x] PO intake [ x ] Tolerance to diet prescription [ x ] weights [ x ] labs[ x ] follow up per protocol  [ ] other:

## 2024-04-08 NOTE — DISCHARGE NOTE NURSING/CASE MANAGEMENT/SOCIAL WORK - NSDCVIVACCINE_GEN_ALL_CORE_FT
COVID-19, mRNA, LNP-S, PF, 100 mcg/ 0.5 mL dose (Moderna); 10-Jovan-2021 15:38; Rjei Hernandez (RN); [employer] [id number] (Exp. Date: 13-Jul-2021); IntraMuscular; Deltoid Right.; 0.5 milliLiter(s);   influenza, injectable, quadrivalent, preservative free; 31-Jan-2019 15:53; Ayaka Bynum (RN); [employer]; [id number] (Exp. Date: 30-Jun-2019); IntraMuscular; Deltoid Right.; 0.5 milliLiter(s); VIS (VIS Published: 07-Aug-2015, VIS Presented: 31-Jan-2019);   influenza, injectable, quadrivalent, preservative free; 10-Nov-2019 14:13; Laverne Lane (RN); [employer]; [id number] (Exp. Date: 30-Jun-2020); IntraMuscular; Deltoid Left.; 0.5 milliLiter(s); VIS (VIS Published: 15-Aug-2019, VIS Presented: 10-Nov-2019);   influenza, injectable, quadrivalent, preservative free; 13-Oct-2020 11:56; Kimberli Cronin (RN); [employer]; [id number] (Exp. Date: 30-Jun-2021); IntraMuscular; Deltoid Right.; 0.5 milliLiter(s); VIS (VIS Published: 15-Aug-2019, VIS Presented: 13-Oct-2020);   Td (adult) preservative free; 18-Jan-2020 20:04; Yulia Vance (RN); [employer]; [id number] (Exp. Date: 19-Jan-2021); IntraMuscular; Deltoid Right.; 0.5 milliLiter(s); VIS (VIS Published: 18-Jan-2020, VIS Presented: 18-Jan-2020);

## 2024-04-08 NOTE — CONSULT NOTE ADULT - ASSESSMENT
59 y/o male from home w/ PMH of ETOH use, gastritis, HLD and multiple admissions for ETOH withdrawal and Dts p/w abdominal pain to ED. 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 Again, requiring ICU admission for benzo refractive DTs    # Neuro:  //DTs  -last drink 3/29 (etoh level 433)  -well known to icu with benzo refractive Dts  - will load with 16mg/kg of phenobarb with 130mg prn IVP  -will add precedex for behavioral control if needed     #Resp:  -satting adequately on RA, maintain sats>92%   - etco2 monitoring     #CV:  -HD stable without vasopressor requirements  - MAP goal>65    #GI:  //abd pain from etoh induced pancreatitis   -lipase elevated in ER   - last EGD per charts was 2-3 yrs ago which showed esophageal ulcer. no evidence of GIB at moment   -Diet: advance as tolerated  - GI ppx: protonix  -abd pain improved since ER    #Renal:  - fluids: D5/LR   - voiding freely, making adequate UO  - replete lytes as appropriate     #ID:  - afebrile, wbc nl  - cont to monitor off abx     #Heme:  //DVT ppx  - cbc stable  -SCDs/chemoppx: lovenox     #Endo:  //sliding scale   - maintaining goal glucose<180     d/w eicu attending Dr Chris treadwell spent 40 minutes assessing presenting problems of acute illness, which pose high probability of life threatening deterioration or end organ damage/dysfunction, as well as medical decision making including initiating plan of care, reviewing data, reviewing radiologic exams, discussing with multidisciplinary team,  discussing goals of care with patient/family, and writing this note.  Non-inclusive of procedures performed, 
58M with ETOH abuse here for withdrawal,  Patient was febrile here. His blood cultures were 1/4 bottles staph epi on 2 different days. Repeat blood cultures are now negative and he is afebrile. He lost IV access and is looking to be discharged.  reports feeling well  Unclear if patient had true bacteremia. Despite patient having staph epi which is resistant to zosyn and only getting one dose of vancomycin on 4/4 his fevers are gone and he cleared his "bacteremia"   Suspect these both could have been contaminant but in an abundance of caution will plan on antibiotics that are sensitive.     Plan:  start Doxycycline 100mg every 12 hours for a total of 10 days   if staying inpatient and has IV access would use vancomycin   if on vancomycin send vancomycin trough with target AUC/NAYE 400-600   (therapeutic drug monitoring required with IV vancomycin)   patient has numerous visits related to his substance abuse problem with alcohol   This needs to be address so prevent further morbidity and eventual mortality     Discussed with Dr. Mark Tai, DO  Chief, Infectious Disease at [facility]  Reachable via [employer] Teams or ID office: [phone]  Weekdays: After 5pm, please call [phone] for all inquiries and new consults  Weekends: Message on-call infectious disease physician via teams (see Genie)

## 2024-04-08 NOTE — CONSULT NOTE ADULT - SUBJECTIVE AND OBJECTIVE BOX
[facility]  INFECTIOUS DISEASES   [address] [place] [address]  Tel: [phone]     Fax: [phone]  ==============================================================================  DO Puma Mao MD Alexandra Gutman, NP   ==============================================================================    ALTON VILLASWINDER  MRN-[id number]  Male  58y ([birth year])        Patient is a 58y old  Male who presents with a chief complaint of ETOH withdrawal (07 Apr 2024 09:29)      HPI:  Mr. Villa is a 59 y/o male from home w/ PMH of ETOH use, gastritis, HLD and multiple admissions for ETOH withdrawal p/w abdominal pain. Patient reports that he has been having significant epigastric abdominal pain, described as burning sensation and is not able to keep anything down. He reports having EGD recently at [address] [place] [address] which happens to be [facility], though no documentation per chart review. His last EGD per charts was 2-3 yrs ago which showed esophageal ulcer. Patient denies any hematemesis coffee  ground emesis or other complaints. He reports drinking "red wine" and black label- whiskey- 1-2 shots every 3-4 days and says that his drink was 4 days ago.   Of note, per chart review it appears that patient drinks daily vodka and whiskey, he was well known to ED provider and RN. He has had multiple admissions at [facility] for ETOH withdrawal requiring phenobarb.     In ED, patient's VS were stable. His CIWA was above 9, was given librium 50mg, valium 10mg x2 and ativan 4mg.  (30 Mar 2024 15:52)      ID consulted for workup and antibiotic management     PAST MEDICAL & SURGICAL HISTORY:  PUD (peptic ulcer disease)      EtOH dependence      Gastritis      Elevated lipase      No significant past surgical history          Allergies  No Known Allergies        ANTIMICROBIALS:  piperacillin/tazobactam IVPB.. 3.375 every 8 hours      MEDICATIONS  (STANDING):  cefTRIAXone   IVPB   100 mL/Hr IV Intermittent (04-06-24 @ 01:22)    cefTRIAXone   IVPB   1000 milliGRAM(s) IV Intermittent (04-03-24 @ 08:57)    cefTRIAXone   IVPB   100 mL/Hr IV Intermittent (04-05-24 @ 07:58)   100 mL/Hr IV Intermittent (04-04-24 @ 09:03)    piperacillin/tazobactam IVPB..   25 mL/Hr IV Intermittent (04-08-24 @ 05:48)   25 mL/Hr IV Intermittent (04-07-24 @ 21:32)   25 mL/Hr IV Intermittent (04-07-24 @ 13:14)   25 mL/Hr IV Intermittent (04-07-24 @ 05:39)   25 mL/Hr IV Intermittent (04-07-24 @ 00:19)    vancomycin  IVPB   250 mL/Hr IV Intermittent (04-04-24 @ 07:27)        OTHER MEDS: MEDICATIONS  (STANDING):  albuterol/ipratropium for Nebulization 3 every 6 hours PRN  aluminum hydroxide/magnesium hydroxide/simethicone Suspension 30 every 4 hours PRN  enoxaparin Injectable 40 every 24 hours  melatonin 3 at bedtime PRN  ondansetron Injectable 4 every 8 hours PRN  polyethylene glycol 3350 17 daily  senna 2 at bedtime      SOCIAL HISTORY:     Smoking Cigarettes [ ]Active [ ] Former [ ]Denies   ETOH [ ]denies [ ]Former [ ]Current Use denies   Drug Use [ ]Never [ ] Former [ ] Active     FAMILY HISTORY:  FH: HTN (hypertension)        REVIEW OF SYSTEMS  [  ] ROS unobtainable because:    [  ] All other systems negative except as noted below:	    Constitutional:  [ ] fever [ ] chills  [ ] weight loss  [ ] weakness  Skin:  [ ] rash [ ] phlebitis	  Eyes: [ ] icterus [ ] pain  [ ] discharge	  ENMT: [ ] sore throat  [ ] thrush [ ] ulcers [ ] exudates  Respiratory: [ ] dyspnea [ ] hemoptysis [ ] cough [ ] sputum	  Cardiovascular:  [ ] chest pain [ ] palpitations [ ] edema	  Gastrointestinal:  [ ] nausea [ ] vomiting [ ] diarrhea [ ] constipation [ ] pain	  Genitourinary:  [ ] dysuria [ ] frequency [ ] hematuria [ ] discharge [ ] flank pain  [ ] incontinence  Musculoskeletal:  [ ] myalgias [ ] arthralgias [ ] arthritis  [ ] back pain  Neurological:  [ ] headache [ ] seizures  [ ] confusion/altered mental status  Psychiatric:  [ ] anxiety [ ] depression	  Hematology/Lymphatics:  [ ] lymphadenopathy  Endocrine:  [ ] adrenal [ ] thyroid  Allergic/Immunologic:	 [ ] transplant [ ] seasonal    Vital Signs Last 24 Hrs  T(F): 98 (04-08-24 @ 07:06), Max: 102.2 (04-03-24 @ 19:07)    Vital Signs Last 24 Hrs  HR: 75 (04-08-24 @ 08:05) (70 - 85)  BP: 117/88 (04-08-24 @ 08:05) (117/88 - 150/95)  RR: 15 (04-08-24 @ 08:05)  SpO2: 71% (04-08-24 @ 08:05) (71% - 100%)  Wt(kg): --    PHYSICAL EXAM:  Constitutional: non-toxic, no distress  HEAD/EYES: anicteric, no conjunctival injection  ENT:  supple, no thrush  Cardiovascular:   normal S1, S2, no murmur, no edema  Respiratory:  clear BS bilaterally, no wheezes, no rales  GI:  soft, non-tender, normal bowel sounds  :  no saxena, no CVA tenderness  Musculoskeletal:  no synovitis, normal ROM  Neurologic: awake and alert, normal strength, no focal findings  Skin:  no rash, no erythema, no phlebitis  Heme/Onc: no lymphadenopathy   Psychiatric:  awake, alert, appropriate mood          WBC Count: 2.79 K/uL (04-08 @ 04:58)  WBC Count: 2.73 K/uL (04-07 @ 13:24)  WBC Count: 2.53 K/uL (04-07 @ 05:20)  WBC Count: 4.06 K/uL (04-06 @ 03:15)  WBC Count: 5.58 K/uL (04-05 @ 03:10)  WBC Count: 4.55 K/uL (04-04 @ 02:20)  WBC Count: 5.19 K/uL (04-03 @ 05:15)      Auto Neutrophil %: 26.0 % *L* (04-08-24 @ 04:58)  Auto Neutrophil #: 0.73 K/uL *L* (04-08-24 @ 04:58)  Auto Neutrophil %: 67.8 % (04-05-24 @ 03:10)  Auto Neutrophil #: 3.78 K/uL (04-05-24 @ 03:10)  Auto Neutrophil %: 76.1 % (04-04-24 @ 02:20)  Auto Neutrophil #: 3.46 K/uL (04-04-24 @ 02:20)                            10.1   2.79  )-----------( 322      ( 08 Apr 2024 04:58 )             31.5       04-08    136  |  107  |  9   ----------------------------<  89  4.4   |  21<L>  |  0.80    Ca    8.8      08 Apr 2024 04:58  Phos  3.0     04-08  Mg     2.0     04-08    TPro  7.2  /  Alb  2.7<L>  /  TBili  0.2  /  DBili  x   /  AST  233<H>  /  ALT  74  /  AlkPhos  43  04-08      Creatinine Trend: 0.80<--, 0.82<--, 0.84<--, 0.94<--, 0.67<--, 0.87<--                  MICROBIOLOGY:  .Blood Blood-Peripheral  04-06-24   No growth at 24 hours  --  --      .Blood Blood-Peripheral  04-06-24   No growth at 24 hours  --  --      .Blood Blood-Peripheral  04-04-24   Growth in anaerobic bottle: Staphylococcus epidermidis  --  Staphylococcus epidermidis      .Blood Blood-Peripheral  04-04-24   No growth at 72 Hours  --  --      Clean Catch Clean Catch (Midstream)  04-03-24   >100,000 CFU/ml Enterobacter cloacae complex  --  Enterobacter cloacae complex      .Sputum Sputum  04-03-24   Normal Respiratory Melanie present  --    No Squamous epithelial cells per low power field  No polymorphonuclear leukocytes per low power field  Rare Gram Positive Cocci in Clusters per oil power field  Rare Gram Negative Rods per oil power field      .Blood Blood  04-02-24   Growth in aerobic bottle: Staphylococcus epidermidis  Isolation of Coagulase negative Staphylococcus from single blood culture  sets may represent  contamination. Contact the Microbiology Department at [phone] if  susceptibility testing is  clinically indicated.  Direct identification is available within approximately 3-5  hours either by Blood Panel Multiplexed PCR or Direct  MALDI-TOF. Details: [email or web address]  --  Blood Culture PCR      .Blood Blood  04-02-24   No growth at 4 days  --  --        SARS-CoV-2: NotDetec (03 Apr 2024 04:00)  SARS-CoV-2: NotDetec (15 Nov 2023 00:36)        v    Rapid RVP Result: NotDetec (04-03 @ 04:00)            RADIOLOGY:      ACC: [id number] EXAM:  XR CHEST PORTABLE URGENT 1V   ORDERED BY: LINUS ROBLERO     PROCEDURE DATE:  04/02/2024          INTERPRETATION:  Clinical history: 58-year-old male, fever.    Portable/expiratory view of the chest is compared to 2/19/2024-9/4/2018.    FINDINGS: Normal cardiac silhouette and normal pulmonary vasculature with   no consolidation, effusion, pneumothorax or acute osseous findings.    Right paratracheal prominence secondary to mediastinal lipomatosis,   unchanged since 2018.    IMPRESSION:    No acute radiographic findings and no change    --- End of Report ---            LINDA GARZA DO; Attending Radiologist  This document has been electronically signed. Apr  3 2024 11:21AM    I have personally reviewed the above imaging  [facility]  INFECTIOUS DISEASES   [address] [place] [address]  Tel: [phone]     Fax: [phone]  ==============================================================================  DO Puma Mao MD Alexandra Gutman, NP   ==============================================================================    ALTON VILLASWINDER  MRN-[id number]  Male  58y ([birth year])        Patient is a 58y old  Male who presents with a chief complaint of ETOH withdrawal (07 Apr 2024 09:29)      HPI:  Mr. Villa is a 57 y/o male from home w/ PMH of ETOH use, gastritis, HLD and multiple admissions for ETOH withdrawal p/w abdominal pain. Patient reports that he has been having significant epigastric abdominal pain, described as burning sensation and is not able to keep anything down. He reports having EGD recently at [address] [place] [address] which happens to be [facility], though no documentation per chart review. His last EGD per charts was 2-3 yrs ago which showed esophageal ulcer. Patient denies any hematemesis coffee  ground emesis or other complaints. He reports drinking "red wine" and black label- whiskey- 1-2 shots every 3-4 days and says that his drink was 4 days ago.   Of note, per chart review it appears that patient drinks daily vodka and whiskey, he was well known to ED provider and RN. He has had multiple admissions at [facility] for ETOH withdrawal requiring phenobarb.     In ED, patient's VS were stable. His CIWA was above 9, was given librium 50mg, valium 10mg x2 and ativan 4mg.  (30 Mar 2024 15:52)    chart review does not show a GI visit with a [facility]. He was a no show a year ago with a gastroenterologist Patient was febrile here. His blood cultures were 1/4 bottles staph epi on 2 different days. Repeat blood cultures are now negative and he is afebrile. He lost IV access and is looking to be discharged.  reports feeling well with no complaints   ID consulted for workup and antibiotic management     PAST MEDICAL & SURGICAL HISTORY:  PUD (peptic ulcer disease)      EtOH dependence      Gastritis      Elevated lipase      No significant past surgical history          Allergies  No Known Allergies        ANTIMICROBIALS:  piperacillin/tazobactam IVPB.. 3.375 every 8 hours      MEDICATIONS  (STANDING):  cefTRIAXone   IVPB   100 mL/Hr IV Intermittent (04-06-24 @ 01:22)    cefTRIAXone   IVPB   1000 milliGRAM(s) IV Intermittent (04-03-24 @ 08:57)    cefTRIAXone   IVPB   100 mL/Hr IV Intermittent (04-05-24 @ 07:58)   100 mL/Hr IV Intermittent (04-04-24 @ 09:03)    piperacillin/tazobactam IVPB..   25 mL/Hr IV Intermittent (04-08-24 @ 05:48)   25 mL/Hr IV Intermittent (04-07-24 @ 21:32)   25 mL/Hr IV Intermittent (04-07-24 @ 13:14)   25 mL/Hr IV Intermittent (04-07-24 @ 05:39)   25 mL/Hr IV Intermittent (04-07-24 @ 00:19)    vancomycin  IVPB   250 mL/Hr IV Intermittent (04-04-24 @ 07:27)        OTHER MEDS: MEDICATIONS  (STANDING):  albuterol/ipratropium for Nebulization 3 every 6 hours PRN  aluminum hydroxide/magnesium hydroxide/simethicone Suspension 30 every 4 hours PRN  enoxaparin Injectable 40 every 24 hours  melatonin 3 at bedtime PRN  ondansetron Injectable 4 every 8 hours PRN  polyethylene glycol 3350 17 daily  senna 2 at bedtime      SOCIAL HISTORY:     Smoking Cigarettes [ ]Active [ ] Former [ ]Denies   ETOH [ ]denies [ ]Former [ x]Current Use everyday   Drug Use [ ]Never [ ] Former [ ] Active     FAMILY HISTORY:  FH: HTN (hypertension)        REVIEW OF SYSTEMS  [  ] ROS unobtainable because:    [x  ] All other systems negative except as noted below:	    Constitutional:  [ ] fever [ ] chills  [ ] weight loss  [ ] weakness  Skin:  [ ] rash [ ] phlebitis	  Eyes: [ ] icterus [ ] pain  [ ] discharge	  ENMT: [ ] sore throat  [ ] thrush [ ] ulcers [ ] exudates  Respiratory: [ ] dyspnea [ ] hemoptysis [ ] cough [ ] sputum	  Cardiovascular:  [ ] chest pain [ ] palpitations [ ] edema	  Gastrointestinal:  [ ] nausea [ ] vomiting [ ] diarrhea [ ] constipation [ ] pain	  Genitourinary:  [ ] dysuria [ ] frequency [ ] hematuria [ ] discharge [ ] flank pain  [ ] incontinence  Musculoskeletal:  [ ] myalgias [ ] arthralgias [ ] arthritis  [ ] back pain  Neurological:  [ ] headache [ ] seizures  [ ] confusion/altered mental status  Psychiatric:  [ ] anxiety [ ] depression	  Hematology/Lymphatics:  [ ] lymphadenopathy  Endocrine:  [ ] adrenal [ ] thyroid  Allergic/Immunologic:	 [ ] transplant [ ] seasonal    Vital Signs Last 24 Hrs  T(F): 98 (04-08-24 @ 07:06), Max: 102.2 (04-03-24 @ 19:07)    Vital Signs Last 24 Hrs  HR: 75 (04-08-24 @ 08:05) (70 - 85)  BP: 117/88 (04-08-24 @ 08:05) (117/88 - 150/95)  RR: 15 (04-08-24 @ 08:05)  SpO2: 71% (04-08-24 @ 08:05) (71% - 100%)  Wt(kg): --    PHYSICAL EXAM:  Constitutional: non-toxic, no distress  HEAD/EYES: anicteric, no conjunctival injection  ENT:  supple, no thrush  Cardiovascular:   normal S1, S2, no murmur, no edema  Respiratory:  clear BS bilaterally, no wheezes, no rales  GI:  soft, non-tender, normal bowel sounds  :  no saxena, no CVA tenderness  Musculoskeletal:  no synovitis, normal ROM  Neurologic: awake and alert, normal strength, no focal findings  Skin:  no rash, no erythema, no phlebitis  Heme/Onc: no lymphadenopathy   Psychiatric:  awake, alert, appropriate mood          WBC Count: 2.79 K/uL (04-08 @ 04:58)  WBC Count: 2.73 K/uL (04-07 @ 13:24)  WBC Count: 2.53 K/uL (04-07 @ 05:20)  WBC Count: 4.06 K/uL (04-06 @ 03:15)  WBC Count: 5.58 K/uL (04-05 @ 03:10)  WBC Count: 4.55 K/uL (04-04 @ 02:20)  WBC Count: 5.19 K/uL (04-03 @ 05:15)      Auto Neutrophil %: 26.0 % *L* (04-08-24 @ 04:58)  Auto Neutrophil #: 0.73 K/uL *L* (04-08-24 @ 04:58)  Auto Neutrophil %: 67.8 % (04-05-24 @ 03:10)  Auto Neutrophil #: 3.78 K/uL (04-05-24 @ 03:10)  Auto Neutrophil %: 76.1 % (04-04-24 @ 02:20)  Auto Neutrophil #: 3.46 K/uL (04-04-24 @ 02:20)                            10.1   2.79  )-----------( 322      ( 08 Apr 2024 04:58 )             31.5       04-08    136  |  107  |  9   ----------------------------<  89  4.4   |  21<L>  |  0.80    Ca    8.8      08 Apr 2024 04:58  Phos  3.0     04-08  Mg     2.0     04-08    TPro  7.2  /  Alb  2.7<L>  /  TBili  0.2  /  DBili  x   /  AST  233<H>  /  ALT  74  /  AlkPhos  43  04-08      Creatinine Trend: 0.80<--, 0.82<--, 0.84<--, 0.94<--, 0.67<--, 0.87<--      MICROBIOLOGY:  .Blood Blood-Peripheral  04-06-24   No growth at 24 hours  --  --      .Blood Blood-Peripheral  04-06-24   No growth at 24 hours  --  --      .Blood Blood-Peripheral  04-04-24   Growth in anaerobic bottle: Staphylococcus epidermidis  --  Staphylococcus epidermidis      .Blood Blood-Peripheral  04-04-24   No growth at 72 Hours  --  --      Clean Catch Clean Catch (Midstream)  04-03-24   >100,000 CFU/ml Enterobacter cloacae complex  --  Enterobacter cloacae complex      .Sputum Sputum  04-03-24   Normal Respiratory Melanie present  --    No Squamous epithelial cells per low power field  No polymorphonuclear leukocytes per low power field  Rare Gram Positive Cocci in Clusters per oil power field  Rare Gram Negative Rods per oil power field      .Blood Blood  04-02-24   Growth in aerobic bottle: Staphylococcus epidermidis  Isolation of Coagulase negative Staphylococcus from single blood culture  sets may represent  contamination. Contact the Microbiology Department at [phone] if  susceptibility testing is  clinically indicated.  Direct identification is available within approximately 3-5  hours either by Blood Panel Multiplexed PCR or Direct  MALDI-TOF. Details: [email or web address]  --  Blood Culture PCR      .Blood Blood  04-02-24   No growth at 4 days  --  --        SARS-CoV-2: NotDetec (03 Apr 2024 04:00)  SARS-CoV-2: NotDetec (15 Nov 2023 00:36)        Rapid RVP Result: NotDetec (04-03 @ 04:00)      RADIOLOGY:      ACC: [id number] EXAM:  XR CHEST PORTABLE URGENT 1V   ORDERED BY: LINUS ROBLERO     PROCEDURE DATE:  04/02/2024          INTERPRETATION:  Clinical history: 58-year-old male, fever.    Portable/expiratory view of the chest is compared to 2/19/2024-9/4/2018.    FINDINGS: Normal cardiac silhouette and normal pulmonary vasculature with   no consolidation, effusion, pneumothorax or acute osseous findings.    Right paratracheal prominence secondary to mediastinal lipomatosis,   unchanged since 2018.    IMPRESSION:    No acute radiographic findings and no change        LINDA GARZA DO; Attending Radiologist  This document has been electronically signed. Apr  3 2024 11:21AM    I have personally reviewed the above imaging

## 2024-04-08 NOTE — DISCHARGE NOTE PROVIDER - HOSPITAL COURSE
57 y/o male from home w/ PMH of ETOH use, gastritis, HLD and multiple admissions for ETOH withdrawal p/w abdominal pain. Patient reports that he has been having significant epigastric abdominal pain, described as burning sensation and is not able to keep anything down. He reports having EGD recently at [address] [place] [address] which happens to be [facility], though no documentation per chart review. His last EGD per charts was 2-3 yrs ago which showed esophageal ulcer. Patient denies any hematemesis coffee  ground emesis or other complaints. He reports drinking "red wine" and black label- whiskey- 1-2 shots every 3-4 days and says that his drink was 4 days ago.   Of note, per chart review it appears that patient drinks daily vodka and whiskey, he was well known to ED provider and RN. He has had multiple admissions at [facility] for ETOH withdrawal requiring phenobarb.     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 In ED, pt was intoxicated with etoh level of 433 and admitted to medicine with [facility] for etoh intox and abd pain from suspected etoh induced pancreatitis. On the floors patient required multiple doses of ativan prompting ICU consultation. Taken to ICU for severe etoh withdrawal. Loaded with phenobarb. Intermittently on precedex gtt for agitation now weaned off. Pt more alert today, speaking in full sentences. Sitting in bed eating breakfast. Pt with no further episodes of agitation. Seen by ID for staph epidermis blood cx x 2. Plan  to discharge home with PO Doxy 100mg BID x 10 days.     F/u with PMD x 1 week.   ETOH cessation counseled.     ETOH withdrawal requiring sedation  ETOH abuse  Sepsis  Bacteremia from Staph epidermis  Counseled for ETOH cessation  Hypokalemia  Elevated Procalcitonin  Gastritis with hx of esophageal ulcer  HLD   59 y/o male from home w/ PMH of ETOH use, gastritis, HLD and multiple admissions for ETOH withdrawal p/w abdominal pain. Patient reports that he has been having significant epigastric abdominal pain, described as burning sensation and is not able to keep anything down. He reports having EGD recently at [address] [place] [address] which happens to be [facility], though no documentation per chart review. His last EGD per charts was 2-3 yrs ago which showed esophageal ulcer. Patient denies any hematemesis coffee  ground emesis or other complaints. He reports drinking "red wine" and black label- whiskey- 1-2 shots every 3-4 days and says that his drink was 4 days ago.   Of note, per chart review it appears that patient drinks daily vodka and whiskey, he was well known to ED provider and RN. He has had multiple admissions at [facility] for ETOH withdrawal requiring phenobarb.     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 In ED, pt was intoxicated with etoh level of 433 and admitted to medicine with [facility] for etoh intox and abd pain from suspected etoh induced pancreatitis. On the floors patient required multiple doses of ativan prompting ICU consultation. Taken to ICU for severe etoh withdrawal. Loaded with phenobarb. Intermittently on precedex gtt for agitation now weaned off. Pt more alert today, speaking in full sentences. Sitting in bed eating breakfast. Pt with no further episodes of agitation. Seen by ID for staph epidermis blood cx x 2. Plan  to discharge home with PO Doxy 100mg BID x 10 days.     F/u with PMD x 1 week.   ETOH cessation counseled.     ETOH withdrawal requiring sedation  ETOH abuse  Sepsis  Bacteremia from Staph epidermis  Counseled for ETOH cessation  Hypokalemia  Elevated Procalcitonin  Gastritis with hx of esophageal ulcer  HLD    Time spent on discharge: 32 mins

## 2024-04-08 NOTE — DISCHARGE NOTE PROVIDER - NSDCMRMEDTOKEN_GEN_ALL_CORE_FT
atorvastatin 20 mg oral tablet: 1 tab(s) orally once a day  doxycycline hyclate 100 mg oral capsule: 1 cap(s) orally 2 times a day  folic acid 1 mg oral tablet: 1 tab(s) orally once a day  Multiple Vitamins oral tablet: 1 tab(s) orally once a day

## 2024-04-08 NOTE — DISCHARGE NOTE NURSING/CASE MANAGEMENT/SOCIAL WORK - NURSING SECTION COMPLETE
An order for CPAP supplies to 4/28/21 to [facility]; patient given contact info for CPAP supplies.   Patient/Caregiver provided printed discharge information.

## 2024-04-08 NOTE — DISCHARGE NOTE PROVIDER - NSDCCPCAREPLAN_GEN_ALL_CORE_FT
PRINCIPAL DISCHARGE DIAGNOSIS  Diagnosis: Alcohol withdrawal  Assessment and Plan of Treatment: 59 y/o male from home w/ PMH of ETOH use, gastritis, HLD and multiple admissions for ETOH withdrawal p/w abdominal pain. Patient reports that he has been having significant epigastric abdominal pain, described as burning sensation and is not able to keep anything down. He reports having EGD recently at [address] [place] [address] which happens to be [facility], though no documentation per chart review. His last EGD per charts was 2-3 yrs ago which showed esophageal ulcer. Patient denies any hematemesis coffee  ground emesis or other complaints. He reports drinking "red wine" and black label- whiskey- 1-2 shots every 3-4 days and says that his drink was 4 days ago.   Of note, per chart review it appears that patient drinks daily vodka and whiskey, he was well known to ED provider and RN. He has had multiple admissions at [facility] for ETOH withdrawal requiring phenobarb.   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 In ED, pt was intoxicated with etoh level of 433 and admitted to medicine with [facility] for etoh intox and abd pain from suspected etoh induced pancreatitis. On the floors patient required multiple doses of ativan prompting ICU consultation. Taken to ICU for severe etoh withdrawal. Loaded with phenobarb. Intermittently on precedex gtt for agitation now weaned off. Pt more alert today, speaking in full sentences. Sitting in bed eating breakfast. Pt with no further episodes of agitation. Seen by ID for staph epidermis blood cx x 2. Plan  to discharge home with PO Doxy 100mg BID x 7 days.   F/u with PMD x 1 week.   ETOH cessation counseled.   ETOH withdrawal requiring sedation  ETOH abuse  Sepsis  Bacteremia from Staph epidermis  Counseled for ETOH cessation  Hypokalemia  Elevated Procalcitonin  Gastritis with hx of esophageal ulcer  HLD

## 2024-04-09 LAB
CULTURE RESULTS: SIGNIFICANT CHANGE UP
SPECIMEN SOURCE: SIGNIFICANT CHANGE UP

## 2024-04-11 NOTE — ED ADULT NURSE REASSESSMENT NOTE - NS ED NURSE REASSESS COMMENT FT1
Pt denies SI at this time. Ok with plan to be DC. Denies any pain at this time. Son is coming to  pt.
pt very difficult IV stick, known to staff, MD aware will need ultrasound line
pt's US IV not working anymore, PAULA Nguyễn aware and will reinsert one.
security called for belonging 
negative

## 2024-04-11 NOTE — BH CONSULTATION LIAISON PROGRESS NOTE - NSBHATTESTTYPEVISIT_PSY_A_CORE
Attending Only
Attending and PA/NP shared services statement (NON-critical care):
Attending Only
Attending Only

## 2024-04-16 DIAGNOSIS — K85.20 ALCOHOL INDUCED ACUTE PANCREATITIS WITHOUT NECROSIS OR INFECTION: ICD-10-CM

## 2024-04-16 DIAGNOSIS — F10.239 ALCOHOL DEPENDENCE WITH WITHDRAWAL, UNSPECIFIED: ICD-10-CM

## 2024-04-16 DIAGNOSIS — B95.7 OTHER STAPHYLOCOCCUS AS THE CAUSE OF DISEASES CLASSIFIED ELSEWHERE: ICD-10-CM

## 2024-04-16 DIAGNOSIS — E87.6 HYPOKALEMIA: ICD-10-CM

## 2024-04-16 DIAGNOSIS — D72.819 DECREASED WHITE BLOOD CELL COUNT, UNSPECIFIED: ICD-10-CM

## 2024-04-16 DIAGNOSIS — R78.81 BACTEREMIA: ICD-10-CM

## 2024-04-16 DIAGNOSIS — R45.1 RESTLESSNESS AND AGITATION: ICD-10-CM

## 2024-04-16 DIAGNOSIS — F10.229 ALCOHOL DEPENDENCE WITH INTOXICATION, UNSPECIFIED: ICD-10-CM

## 2024-04-16 DIAGNOSIS — E78.5 HYPERLIPIDEMIA, UNSPECIFIED: ICD-10-CM
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
## 2024-04-16 DIAGNOSIS — K29.70 GASTRITIS, UNSPECIFIED, WITHOUT BLEEDING: ICD-10-CM

## 2024-04-16 DIAGNOSIS — R00.0 TACHYCARDIA, UNSPECIFIED: ICD-10-CM

## 2024-05-02 NOTE — PROGRESS NOTE ADULT - SUBJECTIVE AND OBJECTIVE BOX
INTERVAL HPI/OVERNIGHT EVENTS:   HPI:  52 year old man with PMH gerd, HLD, etoh abuse presents with complaint of upper abdominal pain, nausea, and several episodes of emesis since yesterday morning.  He says he thinks he had EGD recently which was "ok" but he is not sure.  He is still drinking about 2 bottles of rum over a 3 day period every week.  He also says he is starting to feel tremors and has a headache.  He has not had a drink in about 2 days due to his abdominal pain.    In the ED, work up remarkable only for mild hypokalemia.  CT ab unchanged from previous, lactate initially elevated, normalized. (04 Apr 2018 03:16)  off precedex and calm           PAST MEDICAL & SURGICAL HISTORY:  Mixed hyperlipidemia  PUD (peptic ulcer disease)  No significant past surgical history      REVIEW OF SYSTEMS:    no Sx, no hallucinations      ICU Vital Signs Last 24 Hrs  T(C): 35.9 (09 Apr 2018 11:00), Max: 37.2 (08 Apr 2018 20:18)  T(F): 96.7 (09 Apr 2018 11:00), Max: 99 (08 Apr 2018 20:18)  HR: 81 (09 Apr 2018 14:00) (52 - 123)  BP: 111/77 (09 Apr 2018 14:00) (83/55 - 145/108)  BP(mean): 84 (09 Apr 2018 14:00) (62 - 117)  ABP: --  ABP(mean): --  RR: 20 (09 Apr 2018 14:00) (13 - 25)  SpO2: 100% (09 Apr 2018 11:00) (99% - 100%)          I&O's Detail    08 Apr 2018 07:01  -  09 Apr 2018 07:00  --------------------------------------------------------  IN:    dexmedetomidine Infusion: 119.5 mL    Oral Fluid: 680 mL    sodium chloride 0.9%.: 75 mL  Total IN: 874.5 mL    OUT:    Indwelling Catheter - Urethral: 40 mL    Intermittent Catheterization - Urethral: 500 mL  Total OUT: 540 mL    Total NET: 334.5 mL      09 Apr 2018 07:01  -  09 Apr 2018 14:22  --------------------------------------------------------  IN:    dexmedetomidine Infusion: 6.5 mL    Oral Fluid: 360 mL    sodium chloride 0.9%.: 525 mL  Total IN: 891.5 mL    OUT:    Indwelling Catheter - Urethral: 100 mL  Total OUT: 100 mL    Total NET: 791.5 mL            CAPILLARY BLOOD GLUCOSE        PHYSICAL EXAM:    GENERAL: NAD, well-groomed, well-developed  HEAD:  Atraumatic, Normocephalic  EYES: EOMI, PERRLA, conjunctiva and sclera clear  ENMT: No tonsillar erythema, exudates, or enlargement; Moist mucous membranes, Good dentition, No lesions  NECK: Supple, No JVD, Normal thyroid  NERVOUS SYSTEM:  Alert & Oriented X3, Good concentration; Motor Strength 5/5 B/L upper and lower extremities; DTRs 2+ intact and symmetric  CHEST/LUNG: Clear to percussion bilaterally; No rales, rhonchi, wheezing, or rubs  HEART: Regular rate and rhythm; No murmurs, rubs, or gallops  ABDOMEN: Soft, Nontender, Nondistended; Bowel sounds present  EXTREMITIES:  2+ Peripheral Pulses, No clubbing, cyanosis, or edema  LYMPH: No lymphadenopathy noted  SKIN: No rashes or lesions      LABS:                        13.0   6.23  )-----------( 232      ( 09 Apr 2018 03:54 )             39.8      04-09    142  |  108  |  12  ----------------------------<  102<H>  3.7   |  24  |  0.90    Ca    8.9      09 Apr 2018 03:54  Phos  4.5     04-09  Mg     2.0     04-09              RADIOLOGY & ADDITIONAL STUDIES:      Assessment and Plan:    CRITICAL CARE TIME SPENT:

## 2024-05-13 NOTE — DISCHARGE NOTE PROVIDER - NSDCADMDATE_GEN_ALL_CORE_FT
Procedure:  COLONOSCOPY    Relevant Problems   No relevant active problems      BMI 38    Physical Exam    Airway    Mallampati score: III  TM Distance: >3 FB  Neck ROM: full     Dental   No notable dental hx     Cardiovascular      Pulmonary      Other Findings  post-pubertal.      Anesthesia Plan  ASA Score- 2     Anesthesia Type- IV sedation with anesthesia with ASA Monitors.         Additional Monitors:     Airway Plan:            Plan Factors-Exercise tolerance (METS): >4 METS.    Chart reviewed.   Existing labs reviewed. Patient summary reviewed.                  Induction- intravenous.    Postoperative Plan-     Informed Consent- Anesthetic plan and risks discussed with patient.  I personally reviewed this patient with the CRNA. Discussed and agreed on the Anesthesia Plan with the CRNA..                
07-Aug-2021 14:27

## 2024-05-22 NOTE — ED PROVIDER NOTE - CADM POA PRESS ULCER
Your follow-up appointment is tomorrow with Dr. Brooke at 3:00PM at [facility].     Keep eye shield on for 24 hours, then sleep with it at night for 2 weeks.    No lifting above 10 pounds, bending below the waist, or straining for 2 weeks. No water in the eye or rubbing the eye.         No

## 2024-05-28 NOTE — ED ADULT NURSE NOTE - ALCOHOL PRE SCREEN (AUDIT - C)
Addended by: LEYLA ASTUDILLO on: 5/28/2024 12:04 PM     Modules accepted: Orders    
Statement Selected

## 2024-06-17 NOTE — ED ADULT NURSE NOTE - LAST KNOWN WELL DATE/TIME
"  Assessment & Plan     Encounter to establish care  - Previous care at [facility] the [facility].     Hashimoto's thyroiditis  - Due for labs. Recently switched from Armor thyroid to synthroid.   - levothyroxine (SYNTHROID/LEVOTHROID) 25 MCG tablet  - TSH WITH FREE T4 REFLEX  - T3, Free  - T3, total    Thyroid nodule  - Reviewed. Last thyroid US in March 2023 showed no suspicious thyroid nodules.     Generalized anxiety disorder  - Stable. Follows with psychiatrist.    Recurrent major depressive disorder, in partial remission  - PHQ-9 score of 8. Better controlled than usual.  - Continue fluoxetine.     Class 2 obesity without serious comorbidity with BMI of 39.0 to 39.9 in adult  - Discussed diet and exercise guidelines, encouraged tracking of food and exercise to identify patterns.Discussed that weight gain is often multifactorial.   - Discussed GLP- 1 agonists for weight loss, including risks, benefits, and side effects.   - Offered referral for weight management.   - Patient will consider options and send message if she decides she is interested in one of the options discussed.     Encounter for vaccination  - Discussed benefits and side effects of vaccines administered today.   - TDAP 10-64Y (ADACEL,BOOSTRIX)  - HPV9 (GARDASIL 9)    BMI  Estimated body mass index is 39.13 kg/m  as calculated from the following:    Height as of this encounter: 1.695 m (5' 6.75\").    Weight as of this encounter: 112.5 kg (248 lb).   Weight management plan: Discussed healthy diet and exercise guidelines    Jazmyne Diaz is a 24 year old, presenting for the following health issues:  Establish Care, weight management, and Thyroid Problem        6/17/2024    12:50 PM   Additional Questions   Roomed by tom         6/17/2024    12:50 PM   Patient Reported Additional Medications   Patient reports taking the following new medications none     Via the Health Maintenance questionnaire, the patient has reported the following " services have been completed , this information has been sent to the abstraction team.  History of Present Illness       Reason for visit:  I need a new primary provider and want to check my thyroid levels as well as discuss my issues with weight.    She eats 0-1 servings of fruits and vegetables daily.She consumes 0 sweetened beverage(s) daily.She exercises with enough effort to increase her heart rate 20 to 29 minutes per day.  She exercises with enough effort to increase her heart rate 3 or less days per week. She is missing 2 dose(s) of medications per week.  She is not taking prescribed medications regularly due to remembering to take.     Depression and Anxiety   How are you doing with your depression since your last visit? Improved   How are you doing with your anxiety since your last visit?  Improved   Are you having other symptoms that might be associated with depression or anxiety? No  Have you had a significant life event? No   Do you have any concerns with your use of alcohol or other drugs? No    Social History     Tobacco Use    Smoking status: Never    Smokeless tobacco: Never   Vaping Use    Vaping status: Never Used   Substance Use Topics    Alcohol use: Yes     Comment: Occasionally    Drug use: Not Currently     Types: Marijuana     Comment: Occasionally         6/19/2024    12:54 PM   PHQ   PHQ-9 Total Score 8   Q9: Thoughts of better off dead/self-harm past 2 weeks Not at all         6/19/2024    12:54 PM   VEGA-7 SCORE   Total Score 6     Hypothyroidism Follow-up    Since last visit, patient describes the following symptoms: Weight stable, no hair loss, no skin changes, no constipation, no loose stools, weight gain of 20 lbs in past year, dry skin, anxiety, depression, and fatigue    Recently switched brand of thyroid medication, has not rechecked. She is transferring care from [facility].     Review of Systems  Constitutional, HEENT, cardiovascular, pulmonary, gi and gu systems are  "negative, except as otherwise noted.      Objective    /74 (BP Location: Left arm, Patient Position: Sitting, Cuff Size: Adult Large)   Pulse 87   Temp 98.7  F (37.1  C) (Tympanic)   Resp 20   Ht 1.695 m (5' 6.75\")   Wt 112.5 kg (248 lb)   LMP  (LMP Unknown)   SpO2 97%   BMI 39.13 kg/m    Body mass index is 39.13 kg/m .    Physical Exam   GENERAL: alert and no distress  NECK: no adenopathy, no asymmetry, masses, or scars  RESP: lungs clear to auscultation - no rales, rhonchi or wheezes  CV: regular rate and rhythm, normal S1 S2, no S3 or S4, no murmur, click or rub, no peripheral edema  ABDOMEN: soft, nontender, no hepatosplenomegaly, no masses and bowel sounds normal  MS: no gross musculoskeletal defects noted, no edema  PSYCH: mentation appears normal, affect normal/bright    Signed Electronically by: EDGAR Palumbo CNP    " 03-Mar-2021 10:59

## 2024-06-20 ENCOUNTER — INPATIENT (INPATIENT)
Facility: HOSPITAL | Age: 59
LOS: 0 days | Discharge: HOME | End: 2024-06-21
Attending: GENERAL ACUTE CARE HOSPITAL | Admitting: GENERAL ACUTE CARE HOSPITAL
Payer: MEDICAID

## 2024-06-20 VITALS
DIASTOLIC BLOOD PRESSURE: 100 MMHG | HEIGHT: 70 IN | HEART RATE: 92 BPM | OXYGEN SATURATION: 100 % | SYSTOLIC BLOOD PRESSURE: 175 MMHG | TEMPERATURE: 98 F | WEIGHT: 169.98 LBS | RESPIRATION RATE: 17 BRPM

## 2024-06-20 DIAGNOSIS — R10.9 UNSPECIFIED ABDOMINAL PAIN: ICD-10-CM

## 2024-06-20 DIAGNOSIS — E86.0 DEHYDRATION: ICD-10-CM

## 2024-06-20 DIAGNOSIS — K29.20 ALCOHOLIC GASTRITIS WITHOUT BLEEDING: ICD-10-CM

## 2024-06-20 DIAGNOSIS — K27.9 PEPTIC ULCER, SITE UNSPECIFIED, UNSPECIFIED AS ACUTE OR CHRONIC, WITHOUT HEMORRHAGE OR PERFORATION: ICD-10-CM

## 2024-06-20 DIAGNOSIS — E83.39 OTHER DISORDERS OF PHOSPHORUS METABOLISM: ICD-10-CM

## 2024-06-20 DIAGNOSIS — D50.9 IRON DEFICIENCY ANEMIA, UNSPECIFIED: ICD-10-CM

## 2024-06-20 DIAGNOSIS — F10.229 ALCOHOL DEPENDENCE WITH INTOXICATION, UNSPECIFIED: ICD-10-CM
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
## 2024-06-20 DIAGNOSIS — F10.239 ALCOHOL DEPENDENCE WITH WITHDRAWAL, UNSPECIFIED: ICD-10-CM

## 2024-06-20 DIAGNOSIS — E78.5 HYPERLIPIDEMIA, UNSPECIFIED: ICD-10-CM

## 2024-06-20 LAB
ALBUMIN SERPL ELPH-MCNC: 3.9 G/DL — SIGNIFICANT CHANGE UP (ref 3.3–5)
ALP SERPL-CCNC: 58 U/L — SIGNIFICANT CHANGE UP (ref 40–120)
ALT FLD-CCNC: 38 U/L — SIGNIFICANT CHANGE UP (ref 12–78)
ANION GAP SERPL CALC-SCNC: 13 MMOL/L — SIGNIFICANT CHANGE UP (ref 5–17)
ANISOCYTOSIS BLD QL: SLIGHT — SIGNIFICANT CHANGE UP
AST SERPL-CCNC: 57 U/L — HIGH (ref 15–37)
BASOPHILS # BLD AUTO: 0 K/UL — SIGNIFICANT CHANGE UP (ref 0–0.2)
BASOPHILS NFR BLD AUTO: 0 % — SIGNIFICANT CHANGE UP (ref 0–2)
BILIRUB SERPL-MCNC: 0.4 MG/DL — SIGNIFICANT CHANGE UP (ref 0.2–1.2)
BUN SERPL-MCNC: 12 MG/DL — SIGNIFICANT CHANGE UP (ref 7–23)
CALCIUM SERPL-MCNC: 8.6 MG/DL — SIGNIFICANT CHANGE UP (ref 8.5–10.1)
CHLORIDE SERPL-SCNC: 107 MMOL/L — SIGNIFICANT CHANGE UP (ref 96–108)
CO2 SERPL-SCNC: 23 MMOL/L — SIGNIFICANT CHANGE UP (ref 22–31)
CREAT SERPL-MCNC: 1.24 MG/DL — SIGNIFICANT CHANGE UP (ref 0.5–1.3)
EGFR: 68 ML/MIN/1.73M2 — SIGNIFICANT CHANGE UP
EOSINOPHIL # BLD AUTO: 0.08 K/UL — SIGNIFICANT CHANGE UP (ref 0–0.5)
EOSINOPHIL NFR BLD AUTO: 2 % — SIGNIFICANT CHANGE UP (ref 0–6)
ETHANOL SERPL-MCNC: 387 MG/DL — HIGH (ref 0–10)
GLUCOSE SERPL-MCNC: 94 MG/DL — SIGNIFICANT CHANGE UP (ref 70–99)
HCT VFR BLD CALC: 42.8 % — SIGNIFICANT CHANGE UP (ref 39–50)
HGB BLD-MCNC: 13.2 G/DL — SIGNIFICANT CHANGE UP (ref 13–17)
LIDOCAIN IGE QN: 47 U/L — SIGNIFICANT CHANGE UP (ref 13–75)
LYMPHOCYTES # BLD AUTO: 2.56 K/UL — SIGNIFICANT CHANGE UP (ref 1–3.3)
LYMPHOCYTES # BLD AUTO: 68 % — HIGH (ref 13–44)
MAGNESIUM SERPL-MCNC: 2 MG/DL — SIGNIFICANT CHANGE UP (ref 1.6–2.6)
MANUAL SMEAR VERIFICATION: SIGNIFICANT CHANGE UP
MCHC RBC-ENTMCNC: 22.6 PG — LOW (ref 27–34)
MCHC RBC-ENTMCNC: 30.8 G/DL — LOW (ref 32–36)
MCV RBC AUTO: 73.3 FL — LOW (ref 80–100)
MICROCYTES BLD QL: SLIGHT — SIGNIFICANT CHANGE UP
MONOCYTES # BLD AUTO: 0.34 K/UL — SIGNIFICANT CHANGE UP (ref 0–0.9)
MONOCYTES NFR BLD AUTO: 9 % — SIGNIFICANT CHANGE UP (ref 2–14)
NEUTROPHILS # BLD AUTO: 0.79 K/UL — LOW (ref 1.8–7.4)
NEUTROPHILS NFR BLD AUTO: 21 % — LOW (ref 43–77)
NRBC # BLD: 2 /100 WBCS — HIGH (ref 0–0)
NRBC # BLD: SIGNIFICANT CHANGE UP /100 WBCS (ref 0–0)
OVALOCYTES BLD QL SMEAR: SLIGHT — SIGNIFICANT CHANGE UP
PLAT MORPH BLD: NORMAL — SIGNIFICANT CHANGE UP
PLATELET # BLD AUTO: 279 K/UL — SIGNIFICANT CHANGE UP (ref 150–400)
POTASSIUM SERPL-MCNC: 4.3 MMOL/L — SIGNIFICANT CHANGE UP (ref 3.5–5.3)
POTASSIUM SERPL-SCNC: 4.3 MMOL/L — SIGNIFICANT CHANGE UP (ref 3.5–5.3)
PROT SERPL-MCNC: 8.8 GM/DL — HIGH (ref 6–8.3)
RBC # BLD: 5.84 M/UL — HIGH (ref 4.2–5.8)
RBC # FLD: 19.4 % — HIGH (ref 10.3–14.5)
RBC BLD AUTO: NORMAL — SIGNIFICANT CHANGE UP
SODIUM SERPL-SCNC: 143 MMOL/L — SIGNIFICANT CHANGE UP (ref 135–145)
WBC # BLD: 3.76 K/UL — LOW (ref 3.8–10.5)
WBC # FLD AUTO: 3.76 K/UL — LOW (ref 3.8–10.5)

## 2024-06-20 PROCEDURE — 93010 ELECTROCARDIOGRAM REPORT: CPT

## 2024-06-20 PROCEDURE — 99285 EMERGENCY DEPT VISIT HI MDM: CPT

## 2024-06-20 RX ORDER — THIAMINE HCL 100 MG
100 TABLET ORAL ONCE
Refills: 0 | Status: COMPLETED | OUTPATIENT
Start: 2024-06-20 | End: 2024-06-20

## 2024-06-20 RX ORDER — PANTOPRAZOLE SODIUM 40 MG/10ML
40 INJECTION, POWDER, FOR SOLUTION INTRAVENOUS ONCE
Refills: 0 | Status: COMPLETED | OUTPATIENT
Start: 2024-06-20 | End: 2024-06-20

## 2024-06-20 RX ORDER — HYDROMORPHONE HCL 0.2 MG/ML
0.5 INJECTION, SOLUTION INTRAVENOUS ONCE
Refills: 0 | Status: DISCONTINUED | OUTPATIENT
Start: 2024-06-20 | End: 2024-06-20

## 2024-06-20 RX ORDER — LORAZEPAM 0.5 MG
2 TABLET ORAL ONCE
Refills: 0 | Status: DISCONTINUED | OUTPATIENT
Start: 2024-06-20 | End: 2024-06-20

## 2024-06-20 RX ORDER — CHLORDIAZEPOXIDE HYDROCHLORIDE 10 MG/1
50 CAPSULE ORAL ONCE
Refills: 0 | Status: DISCONTINUED | OUTPATIENT
Start: 2024-06-20 | End: 2024-06-20

## 2024-06-20 RX ORDER — SODIUM CHLORIDE 0.9 % (FLUSH) 0.9 %
1000 SYRINGE (ML) INJECTION ONCE
Refills: 0 | Status: COMPLETED | OUTPATIENT
Start: 2024-06-20 | End: 2024-06-20

## 2024-06-20 RX ORDER — ONDANSETRON HYDROCHLORIDE 2 MG/ML
4 INJECTION INTRAMUSCULAR; INTRAVENOUS ONCE
Refills: 0 | Status: COMPLETED | OUTPATIENT
Start: 2024-06-20 | End: 2024-06-20

## 2024-06-20 RX ADMIN — CHLORDIAZEPOXIDE HYDROCHLORIDE 50 MILLIGRAM(S): 10 CAPSULE ORAL at 21:38

## 2024-06-20 RX ADMIN — HYDROMORPHONE HCL 0.5 MILLIGRAM(S): 0.2 INJECTION, SOLUTION INTRAVENOUS at 21:53

## 2024-06-20 RX ADMIN — HYDROMORPHONE HCL 0.5 MILLIGRAM(S): 0.2 INJECTION, SOLUTION INTRAVENOUS at 22:53

## 2024-06-20 RX ADMIN — PANTOPRAZOLE SODIUM 40 MILLIGRAM(S): 40 INJECTION, POWDER, FOR SOLUTION INTRAVENOUS at 21:52

## 2024-06-20 RX ADMIN — ONDANSETRON HYDROCHLORIDE 4 MILLIGRAM(S): 2 INJECTION INTRAMUSCULAR; INTRAVENOUS at 21:52

## 2024-06-20 RX ADMIN — HYDROMORPHONE HCL 0.5 MILLIGRAM(S): 0.2 INJECTION, SOLUTION INTRAVENOUS at 23:54

## 2024-06-20 RX ADMIN — Medication 100 MILLIGRAM(S): at 21:37

## 2024-06-20 RX ADMIN — Medication 2 MILLIGRAM(S): at 22:22

## 2024-06-20 RX ADMIN — Medication 1000 MILLILITER(S): at 21:52

## 2024-06-21 ENCOUNTER — TRANSCRIPTION ENCOUNTER (OUTPATIENT)
Age: 59
End: 2024-06-21

## 2024-06-21 VITALS
SYSTOLIC BLOOD PRESSURE: 137 MMHG | RESPIRATION RATE: 17 BRPM | HEART RATE: 78 BPM | TEMPERATURE: 99 F | DIASTOLIC BLOOD PRESSURE: 81 MMHG

## 2024-06-21 DIAGNOSIS — Z29.9 ENCOUNTER FOR PROPHYLACTIC MEASURES, UNSPECIFIED: ICD-10-CM

## 2024-06-21 DIAGNOSIS — F10.239 ALCOHOL DEPENDENCE WITH WITHDRAWAL, UNSPECIFIED: ICD-10-CM

## 2024-06-21 DIAGNOSIS — K27.9 PEPTIC ULCER, SITE UNSPECIFIED, UNSPECIFIED AS ACUTE OR CHRONIC, WITHOUT HEMORRHAGE OR PERFORATION: ICD-10-CM

## 2024-06-21 LAB
ALBUMIN SERPL ELPH-MCNC: 3.3 G/DL — SIGNIFICANT CHANGE UP (ref 3.3–5)
ALP SERPL-CCNC: 45 U/L — SIGNIFICANT CHANGE UP (ref 40–120)
ALT FLD-CCNC: 28 U/L — SIGNIFICANT CHANGE UP (ref 12–78)
ANION GAP SERPL CALC-SCNC: 8 MMOL/L — SIGNIFICANT CHANGE UP (ref 5–17)
AST SERPL-CCNC: 38 U/L — HIGH (ref 15–37)
BASOPHILS # BLD AUTO: 0.03 K/UL — SIGNIFICANT CHANGE UP (ref 0–0.2)
BASOPHILS NFR BLD AUTO: 0.8 % — SIGNIFICANT CHANGE UP (ref 0–2)
BILIRUB SERPL-MCNC: 0.7 MG/DL — SIGNIFICANT CHANGE UP (ref 0.2–1.2)
BUN SERPL-MCNC: 11 MG/DL — SIGNIFICANT CHANGE UP (ref 7–23)
CALCIUM SERPL-MCNC: 8.1 MG/DL — LOW (ref 8.5–10.1)
CHLORIDE SERPL-SCNC: 110 MMOL/L — HIGH (ref 96–108)
CO2 SERPL-SCNC: 22 MMOL/L — SIGNIFICANT CHANGE UP (ref 22–31)
CREAT SERPL-MCNC: 0.98 MG/DL — SIGNIFICANT CHANGE UP (ref 0.5–1.3)
EGFR: 90 ML/MIN/1.73M2 — SIGNIFICANT CHANGE UP
EOSINOPHIL # BLD AUTO: 0.09 K/UL — SIGNIFICANT CHANGE UP (ref 0–0.5)
EOSINOPHIL NFR BLD AUTO: 2.4 % — SIGNIFICANT CHANGE UP (ref 0–6)
GLUCOSE SERPL-MCNC: 79 MG/DL — SIGNIFICANT CHANGE UP (ref 70–99)
HCT VFR BLD CALC: 33.8 % — LOW (ref 39–50)
HGB BLD-MCNC: 10.7 G/DL — LOW (ref 13–17)
IMM GRANULOCYTES NFR BLD AUTO: 0.3 % — SIGNIFICANT CHANGE UP (ref 0–0.9)
LYMPHOCYTES # BLD AUTO: 1.8 K/UL — SIGNIFICANT CHANGE UP (ref 1–3.3)
LYMPHOCYTES # BLD AUTO: 47.6 % — HIGH (ref 13–44)
MAGNESIUM SERPL-MCNC: 1.6 MG/DL — SIGNIFICANT CHANGE UP (ref 1.6–2.6)
MCHC RBC-ENTMCNC: 23.3 PG — LOW (ref 27–34)
MCHC RBC-ENTMCNC: 31.7 G/DL — LOW (ref 32–36)
MCV RBC AUTO: 73.5 FL — LOW (ref 80–100)
MONOCYTES # BLD AUTO: 0.4 K/UL — SIGNIFICANT CHANGE UP (ref 0–0.9)
MONOCYTES NFR BLD AUTO: 10.6 % — SIGNIFICANT CHANGE UP (ref 2–14)
NEUTROPHILS # BLD AUTO: 1.45 K/UL — LOW (ref 1.8–7.4)
NEUTROPHILS NFR BLD AUTO: 38.3 % — LOW (ref 43–77)
NRBC # BLD: 0 /100 WBCS — SIGNIFICANT CHANGE UP (ref 0–0)
PHOSPHATE SERPL-MCNC: 2 MG/DL — LOW (ref 2.5–4.5)
PLATELET # BLD AUTO: 214 K/UL — SIGNIFICANT CHANGE UP (ref 150–400)
POTASSIUM SERPL-MCNC: 3.7 MMOL/L — SIGNIFICANT CHANGE UP (ref 3.5–5.3)
POTASSIUM SERPL-SCNC: 3.7 MMOL/L — SIGNIFICANT CHANGE UP (ref 3.5–5.3)
PROT SERPL-MCNC: 6.8 GM/DL — SIGNIFICANT CHANGE UP (ref 6–8.3)
RBC # BLD: 4.6 M/UL — SIGNIFICANT CHANGE UP (ref 4.2–5.8)
RBC # FLD: 18.2 % — HIGH (ref 10.3–14.5)
SODIUM SERPL-SCNC: 140 MMOL/L — SIGNIFICANT CHANGE UP (ref 135–145)
WBC # BLD: 3.78 K/UL — LOW (ref 3.8–10.5)
WBC # FLD AUTO: 3.78 K/UL — LOW (ref 3.8–10.5)

## 2024-06-21 PROCEDURE — 74176 CT ABD & PELVIS W/O CONTRAST: CPT | Mod: 26,MC

## 2024-06-21 PROCEDURE — 99239 HOSP IP/OBS DSCHRG MGMT >30: CPT

## 2024-06-21 PROCEDURE — 99223 1ST HOSP IP/OBS HIGH 75: CPT

## 2024-06-21 RX ORDER — PANTOPRAZOLE SODIUM 40 MG/10ML
40 INJECTION, POWDER, FOR SOLUTION INTRAVENOUS DAILY
Refills: 0 | Status: DISCONTINUED | OUTPATIENT
Start: 2024-06-21 | End: 2024-06-21

## 2024-06-21 RX ORDER — LORAZEPAM 0.5 MG
2 TABLET ORAL
Refills: 0 | Status: DISCONTINUED | OUTPATIENT
Start: 2024-06-21 | End: 2024-06-21

## 2024-06-21 RX ORDER — CHLORDIAZEPOXIDE HYDROCHLORIDE 10 MG/1
50 CAPSULE ORAL EVERY 12 HOURS
Refills: 0 | Status: CANCELLED | OUTPATIENT
Start: 2024-06-23 | End: 2024-06-21

## 2024-06-21 RX ORDER — SUCRALFATE 1 G
1 TABLET ORAL
Refills: 0 | Status: DISCONTINUED | OUTPATIENT
Start: 2024-06-21 | End: 2024-06-21

## 2024-06-21 RX ORDER — CHLORDIAZEPOXIDE HYDROCHLORIDE 10 MG/1
50 CAPSULE ORAL EVERY 24 HOURS
Refills: 0 | Status: CANCELLED | OUTPATIENT
Start: 2024-06-25 | End: 2024-06-21

## 2024-06-21 RX ORDER — SUCRALFATE 1 G
1 TABLET ORAL
Qty: 120 | Refills: 0
Start: 2024-06-21 | End: 2024-07-20

## 2024-06-21 RX ORDER — ACETAMINOPHEN 325 MG
650 TABLET ORAL EVERY 6 HOURS
Refills: 0 | Status: DISCONTINUED | OUTPATIENT
Start: 2024-06-21 | End: 2024-06-21

## 2024-06-21 RX ORDER — SOD PHOS DI, MONO/K PHOS MONO 250 MG
1 TABLET ORAL
Qty: 3 | Refills: 0
Start: 2024-06-21 | End: 2024-06-21

## 2024-06-21 RX ORDER — DEXTROSE MONOHYDRATE AND SODIUM CHLORIDE 5; .3 G/100ML; G/100ML
1000 INJECTION, SOLUTION INTRAVENOUS
Refills: 0 | Status: DISCONTINUED | OUTPATIENT
Start: 2024-06-21 | End: 2024-06-21

## 2024-06-21 RX ORDER — SOD PHOS DI, MONO/K PHOS MONO 250 MG
1 TABLET ORAL ONCE
Refills: 0 | Status: DISCONTINUED | OUTPATIENT
Start: 2024-06-21 | End: 2024-06-21

## 2024-06-21 RX ORDER — FOLIC ACID
1 POWDER (GRAM) MISCELLANEOUS DAILY
Refills: 0 | Status: DISCONTINUED | OUTPATIENT
Start: 2024-06-21 | End: 2024-06-21

## 2024-06-21 RX ORDER — THIAMINE HCL 100 MG
1 TABLET ORAL
Qty: 30 | Refills: 0
Start: 2024-06-21 | End: 2024-07-20

## 2024-06-21 RX ORDER — CHLORDIAZEPOXIDE HYDROCHLORIDE 10 MG/1
50 CAPSULE ORAL EVERY 6 HOURS
Refills: 0 | Status: COMPLETED | OUTPATIENT
Start: 2024-06-21 | End: 2024-06-21

## 2024-06-21 RX ORDER — THIAMINE HCL 100 MG
100 TABLET ORAL DAILY
Refills: 0 | Status: DISCONTINUED | OUTPATIENT
Start: 2024-06-21 | End: 2024-06-21

## 2024-06-21 RX ORDER — LORAZEPAM 0.5 MG
2 TABLET ORAL ONCE
Refills: 0 | Status: DISCONTINUED | OUTPATIENT
Start: 2024-06-21 | End: 2024-06-21

## 2024-06-21 RX ORDER — FOLIC ACID
1 POWDER (GRAM) MISCELLANEOUS
Qty: 30 | Refills: 0
Start: 2024-06-21 | End: 2024-07-20

## 2024-06-21 RX ORDER — HEPARIN SODIUM 50 [USP'U]/ML
5000 INJECTION, SOLUTION INTRAVENOUS EVERY 12 HOURS
Refills: 0 | Status: DISCONTINUED | OUTPATIENT
Start: 2024-06-21 | End: 2024-06-21

## 2024-06-21 RX ORDER — HYDROMORPHONE HCL 0.2 MG/ML
0.5 INJECTION, SOLUTION INTRAVENOUS ONCE
Refills: 0 | Status: DISCONTINUED | OUTPATIENT
Start: 2024-06-21 | End: 2024-06-21

## 2024-06-21 RX ORDER — SOD PHOS DI, MONO/K PHOS MONO 250 MG
1 TABLET ORAL EVERY 8 HOURS
Refills: 0 | Status: DISCONTINUED | OUTPATIENT
Start: 2024-06-21 | End: 2024-06-21

## 2024-06-21 RX ORDER — PANTOPRAZOLE SODIUM 40 MG/10ML
1 INJECTION, POWDER, FOR SOLUTION INTRAVENOUS
Qty: 30 | Refills: 0
Start: 2024-06-21 | End: 2024-07-20

## 2024-06-21 RX ORDER — THIAMINE HCL 100 MG
100 TABLET ORAL ONCE
Refills: 0 | Status: COMPLETED | OUTPATIENT
Start: 2024-06-21 | End: 2024-06-21

## 2024-06-21 RX ORDER — CHLORDIAZEPOXIDE HYDROCHLORIDE 10 MG/1
1 CAPSULE ORAL
Qty: 6 | Refills: 0
Start: 2024-06-21

## 2024-06-21 RX ORDER — CHLORDIAZEPOXIDE HYDROCHLORIDE 10 MG/1
50 CAPSULE ORAL EVERY 8 HOURS
Refills: 0 | Status: DISCONTINUED | OUTPATIENT
Start: 2024-06-22 | End: 2024-06-21

## 2024-06-21 RX ORDER — MAGNESIUM, ALUMINUM HYDROXIDE 400-400
30 TABLET,CHEWABLE ORAL EVERY 4 HOURS
Refills: 0 | Status: DISCONTINUED | OUTPATIENT
Start: 2024-06-21 | End: 2024-06-21

## 2024-06-21 RX ORDER — SODIUM CHLORIDE 0.9 % (FLUSH) 0.9 %
1000 SYRINGE (ML) INJECTION ONCE
Refills: 0 | Status: COMPLETED | OUTPATIENT
Start: 2024-06-21 | End: 2024-06-21

## 2024-06-21 RX ORDER — ONDANSETRON HYDROCHLORIDE 2 MG/ML
4 INJECTION INTRAMUSCULAR; INTRAVENOUS EVERY 8 HOURS
Refills: 0 | Status: DISCONTINUED | OUTPATIENT
Start: 2024-06-21 | End: 2024-06-21

## 2024-06-21 RX ORDER — CHLORDIAZEPOXIDE HYDROCHLORIDE 10 MG/1
CAPSULE ORAL
Refills: 0 | Status: DISCONTINUED | OUTPATIENT
Start: 2024-06-21 | End: 2024-06-21

## 2024-06-21 RX ADMIN — Medication 100 MILLIGRAM(S): at 12:21

## 2024-06-21 RX ADMIN — DEXTROSE MONOHYDRATE AND SODIUM CHLORIDE 100 MILLILITER(S): 5; .3 INJECTION, SOLUTION INTRAVENOUS at 13:34

## 2024-06-21 RX ADMIN — CHLORDIAZEPOXIDE HYDROCHLORIDE 50 MILLIGRAM(S): 10 CAPSULE ORAL at 07:55

## 2024-06-21 RX ADMIN — Medication 1 MILLIGRAM(S): at 12:21

## 2024-06-21 RX ADMIN — HYDROMORPHONE HCL 0.5 MILLIGRAM(S): 0.2 INJECTION, SOLUTION INTRAVENOUS at 01:55

## 2024-06-21 RX ADMIN — HYDROMORPHONE HCL 0.5 MILLIGRAM(S): 0.2 INJECTION, SOLUTION INTRAVENOUS at 00:54

## 2024-06-21 RX ADMIN — Medication 2 MILLIGRAM(S): at 03:32

## 2024-06-21 RX ADMIN — CHLORDIAZEPOXIDE HYDROCHLORIDE 50 MILLIGRAM(S): 10 CAPSULE ORAL at 12:21

## 2024-06-21 RX ADMIN — Medication 2 MILLIGRAM(S): at 01:05

## 2024-06-21 RX ADMIN — HYDROMORPHONE HCL 0.5 MILLIGRAM(S): 0.2 INJECTION, SOLUTION INTRAVENOUS at 00:37

## 2024-06-21 RX ADMIN — Medication 1 GRAM(S): at 05:16

## 2024-06-21 RX ADMIN — Medication 100 MILLIGRAM(S): at 03:33

## 2024-06-21 RX ADMIN — Medication 1 GRAM(S): at 12:21

## 2024-06-21 RX ADMIN — CHLORDIAZEPOXIDE HYDROCHLORIDE 50 MILLIGRAM(S): 10 CAPSULE ORAL at 02:57

## 2024-06-21 RX ADMIN — DEXTROSE MONOHYDRATE AND SODIUM CHLORIDE 100 MILLILITER(S): 5; .3 INJECTION, SOLUTION INTRAVENOUS at 03:38

## 2024-06-21 RX ADMIN — Medication 650 MILLIGRAM(S): at 02:58

## 2024-06-21 RX ADMIN — Medication 1 TABLET(S): at 12:21

## 2024-06-21 RX ADMIN — Medication 1 TABLET(S): at 13:31

## 2024-06-21 RX ADMIN — Medication 1000 MILLILITER(S): at 00:44

## 2024-06-21 RX ADMIN — HEPARIN SODIUM 5000 UNIT(S): 50 INJECTION, SOLUTION INTRAVENOUS at 05:18

## 2024-06-21 RX ADMIN — PANTOPRAZOLE SODIUM 40 MILLIGRAM(S): 40 INJECTION, POWDER, FOR SOLUTION INTRAVENOUS at 12:21

## 2024-06-21 RX ADMIN — Medication 1000 MILLILITER(S): at 01:53

## 2024-06-24 ENCOUNTER — EMERGENCY (EMERGENCY)
Facility: HOSPITAL | Age: 59
LOS: 0 days | Discharge: HOME | End: 2024-06-24
Attending: EMERGENCY MEDICINE
Payer: MEDICAID

## 2024-06-24 VITALS
HEART RATE: 91 BPM | HEIGHT: 70 IN | SYSTOLIC BLOOD PRESSURE: 119 MMHG | DIASTOLIC BLOOD PRESSURE: 84 MMHG | RESPIRATION RATE: 18 BRPM | WEIGHT: 169.98 LBS | TEMPERATURE: 98 F | OXYGEN SATURATION: 97 %

## 2024-06-24 VITALS
HEART RATE: 76 BPM | TEMPERATURE: 98 F | DIASTOLIC BLOOD PRESSURE: 90 MMHG | RESPIRATION RATE: 21 BRPM | OXYGEN SATURATION: 99 % | SYSTOLIC BLOOD PRESSURE: 137 MMHG

## 2024-06-24 DIAGNOSIS — R10.9 UNSPECIFIED ABDOMINAL PAIN: ICD-10-CM

## 2024-06-24 DIAGNOSIS — Z87.19 PERSONAL HISTORY OF OTHER DISEASES OF THE DIGESTIVE SYSTEM: ICD-10-CM

## 2024-06-24 DIAGNOSIS — K29.70 GASTRITIS, UNSPECIFIED, WITHOUT BLEEDING: ICD-10-CM

## 2024-06-24 LAB
ALBUMIN SERPL ELPH-MCNC: 3.6 G/DL — SIGNIFICANT CHANGE UP (ref 3.3–5)
ALP SERPL-CCNC: 48 U/L — SIGNIFICANT CHANGE UP (ref 40–120)
ALT FLD-CCNC: 28 U/L — SIGNIFICANT CHANGE UP (ref 12–78)
ANION GAP SERPL CALC-SCNC: 13 MMOL/L — SIGNIFICANT CHANGE UP (ref 5–17)
AST SERPL-CCNC: 37 U/L — SIGNIFICANT CHANGE UP (ref 15–37)
BASOPHILS # BLD AUTO: 0.04 K/UL — SIGNIFICANT CHANGE UP (ref 0–0.2)
BASOPHILS NFR BLD AUTO: 0.8 % — SIGNIFICANT CHANGE UP (ref 0–2)
BILIRUB SERPL-MCNC: 0.4 MG/DL — SIGNIFICANT CHANGE UP (ref 0.2–1.2)
BUN SERPL-MCNC: 11 MG/DL — SIGNIFICANT CHANGE UP (ref 7–23)
CALCIUM SERPL-MCNC: 8.6 MG/DL — SIGNIFICANT CHANGE UP (ref 8.5–10.1)
CHLORIDE SERPL-SCNC: 112 MMOL/L — HIGH (ref 96–108)
CO2 SERPL-SCNC: 19 MMOL/L — LOW (ref 22–31)
CREAT SERPL-MCNC: 1.12 MG/DL — SIGNIFICANT CHANGE UP (ref 0.5–1.3)
EGFR: 77 ML/MIN/1.73M2 — SIGNIFICANT CHANGE UP
EOSINOPHIL # BLD AUTO: 0.1 K/UL — SIGNIFICANT CHANGE UP (ref 0–0.5)
EOSINOPHIL NFR BLD AUTO: 1.9 % — SIGNIFICANT CHANGE UP (ref 0–6)
ETHANOL SERPL-MCNC: 320 MG/DL — HIGH (ref 0–10)
GLUCOSE SERPL-MCNC: 87 MG/DL — SIGNIFICANT CHANGE UP (ref 70–99)
HCT VFR BLD CALC: 38.1 % — LOW (ref 39–50)
HGB BLD-MCNC: 12 G/DL — LOW (ref 13–17)
IMM GRANULOCYTES NFR BLD AUTO: 0.2 % — SIGNIFICANT CHANGE UP (ref 0–0.9)
LACTATE SERPL-SCNC: 3.1 MMOL/L — HIGH (ref 0.7–2)
LIDOCAIN IGE QN: 44 U/L — SIGNIFICANT CHANGE UP (ref 13–75)
LYMPHOCYTES # BLD AUTO: 2.74 K/UL — SIGNIFICANT CHANGE UP (ref 1–3.3)
LYMPHOCYTES # BLD AUTO: 53.3 % — HIGH (ref 13–44)
MCHC RBC-ENTMCNC: 22.9 PG — LOW (ref 27–34)
MCHC RBC-ENTMCNC: 31.5 G/DL — LOW (ref 32–36)
MCV RBC AUTO: 72.7 FL — LOW (ref 80–100)
MONOCYTES # BLD AUTO: 0.32 K/UL — SIGNIFICANT CHANGE UP (ref 0–0.9)
MONOCYTES NFR BLD AUTO: 6.2 % — SIGNIFICANT CHANGE UP (ref 2–14)
NEUTROPHILS # BLD AUTO: 1.93 K/UL — SIGNIFICANT CHANGE UP (ref 1.8–7.4)
NEUTROPHILS NFR BLD AUTO: 37.6 % — LOW (ref 43–77)
NRBC # BLD: 0 /100 WBCS — SIGNIFICANT CHANGE UP (ref 0–0)
PLATELET # BLD AUTO: 258 K/UL — SIGNIFICANT CHANGE UP (ref 150–400)
POTASSIUM SERPL-MCNC: 4.1 MMOL/L — SIGNIFICANT CHANGE UP (ref 3.5–5.3)
POTASSIUM SERPL-SCNC: 4.1 MMOL/L — SIGNIFICANT CHANGE UP (ref 3.5–5.3)
PROT SERPL-MCNC: 7.7 GM/DL — SIGNIFICANT CHANGE UP (ref 6–8.3)
RBC # BLD: 5.24 M/UL — SIGNIFICANT CHANGE UP (ref 4.2–5.8)
RBC # FLD: 19.5 % — HIGH (ref 10.3–14.5)
SODIUM SERPL-SCNC: 144 MMOL/L — SIGNIFICANT CHANGE UP (ref 135–145)
WBC # BLD: 5.14 K/UL — SIGNIFICANT CHANGE UP (ref 3.8–10.5)
WBC # FLD AUTO: 5.14 K/UL — SIGNIFICANT CHANGE UP (ref 3.8–10.5)

## 2024-06-24 PROCEDURE — 99285 EMERGENCY DEPT VISIT HI MDM: CPT

## 2024-06-24 RX ORDER — FAMOTIDINE 10 MG/ML
1 INJECTION INTRAVENOUS
Qty: 14 | Refills: 0
Start: 2024-06-24 | End: 2024-06-30

## 2024-06-24 RX ORDER — LIDOCAINE 4 G/100G
10 CREAM TOPICAL ONCE
Refills: 0 | Status: COMPLETED | OUTPATIENT
Start: 2024-06-24 | End: 2024-06-24

## 2024-06-24 RX ORDER — FAMOTIDINE 10 MG/ML
20 INJECTION INTRAVENOUS ONCE
Refills: 0 | Status: COMPLETED | OUTPATIENT
Start: 2024-06-24 | End: 2024-06-24

## 2024-06-24 RX ORDER — MORPHINE SULFATE 50 MG/1
4 CAPSULE, EXTENDED RELEASE ORAL ONCE
Refills: 0 | Status: DISCONTINUED | OUTPATIENT
Start: 2024-06-24 | End: 2024-06-24

## 2024-06-24 RX ORDER — ONDANSETRON 8 MG/1
1 TABLET, FILM COATED ORAL
Qty: 6 | Refills: 0
Start: 2024-06-24 | End: 2024-06-26

## 2024-06-24 RX ORDER — SODIUM CHLORIDE 9 MG/ML
1000 INJECTION INTRAMUSCULAR; INTRAVENOUS; SUBCUTANEOUS ONCE
Refills: 0 | Status: COMPLETED | OUTPATIENT
Start: 2024-06-24 | End: 2024-06-24

## 2024-06-24 RX ADMIN — MORPHINE SULFATE 4 MILLIGRAM(S): 50 CAPSULE, EXTENDED RELEASE ORAL at 19:37

## 2024-06-24 RX ADMIN — FAMOTIDINE 20 MILLIGRAM(S): 10 INJECTION INTRAVENOUS at 19:37

## 2024-06-24 RX ADMIN — LIDOCAINE 10 MILLILITER(S): 4 CREAM TOPICAL at 20:52

## 2024-06-24 RX ADMIN — Medication 30 MILLILITER(S): at 20:52

## 2024-06-24 RX ADMIN — SODIUM CHLORIDE 1000 MILLILITER(S): 9 INJECTION INTRAMUSCULAR; INTRAVENOUS; SUBCUTANEOUS at 19:07

## 2024-06-24 NOTE — ED ADULT NURSE NOTE - NSFALLHARMRISKINTERV_ED_ALL_ED
Assistance OOB with selected safe patient handling equipment if applicable/Assistance with ambulation/Communicate risk of Fall with Harm to all staff, patient, and family/Monitor gait and stability/Provide visual cue: red socks, yellow wristband, yellow gown, etc/Reinforce activity limits and safety measures with patient and family/Use of alarms - bed, stretcher, chair and/or video monitoring/Bed in lowest position, wheels locked, appropriate side rails in place/Call bell, personal items and telephone in reach/Instruct patient to call for assistance before getting out of bed/chair/stretcher/Non-slip footwear applied when patient is off stretcher/[place] to call system/Physically safe environment - no spills, clutter or unnecessary equipment/Purposeful Proactive Rounding/Room/bathroom lighting operational, light cord in reach

## 2024-06-24 NOTE — ED PROVIDER NOTE - CLINICAL SUMMARY MEDICAL DECISION MAKING FREE TEXT BOX
Patient with epigastric discomfort similar to previous pain and otherwise improved after Pepcid nontender on exam.  Was given morphine as well as patient has expressed that it helps him when the pain is present like this.  Will otherwise discharge with outpatient GI follow-up.

## 2024-06-24 NOTE — ED PROVIDER NOTE - PATIENT PORTAL LINK FT
You can access the FollowMyHealth Patient Portal offered by [facility] by registering at the following website: [email or web address]. By joining [employer]’s FollowMyHealth portal, you will also be able to view your health information using other applications (apps) compatible with our system.

## 2024-06-24 NOTE — ED PROVIDER NOTE - OBJECTIVE STATEMENT
59-year-old male with history of alcohol abuse, pancreatitis, gastritis presenting to ER due to epigastric discomfort and pain.  Patient otherwise asking for morphine and stomach medicine to help with pain.  Denies any nausea vomiting and denies any persistent discomfort beyond what his usual pain is

## 2024-06-24 NOTE — ED ADULT NURSE NOTE - OBJECTIVE STATEMENT
Pt BIBA for etoh intoxication, Pt AOx2, responsive, ambulating with unsteady gait. pt c/o generalized abdominal pain. denies n/v/d, fever/chills, CP/SOB/difficulty breathing. NKDA. Pt hardstick, 3 RN attempt made. MD made aware for use of US for blood work and IV placement.

## 2024-06-30 ENCOUNTER — EMERGENCY (EMERGENCY)
Facility: HOSPITAL | Age: 59
LOS: 0 days | Discharge: LEFT BEFORE DISCHARGE | End: 2024-06-30
Attending: EMERGENCY MEDICINE
Payer: MEDICAID

## 2024-06-30 VITALS
SYSTOLIC BLOOD PRESSURE: 122 MMHG | HEART RATE: 87 BPM | DIASTOLIC BLOOD PRESSURE: 81 MMHG | WEIGHT: 225.09 LBS | RESPIRATION RATE: 16 BRPM | TEMPERATURE: 98 F | HEIGHT: 70 IN | OXYGEN SATURATION: 98 %

## 2024-06-30 VITALS
DIASTOLIC BLOOD PRESSURE: 82 MMHG | SYSTOLIC BLOOD PRESSURE: 136 MMHG | OXYGEN SATURATION: 96 % | HEART RATE: 77 BPM | RESPIRATION RATE: 17 BRPM | TEMPERATURE: 98 F

## 2024-06-30 DIAGNOSIS — R11.2 NAUSEA WITH VOMITING, UNSPECIFIED: ICD-10-CM

## 2024-06-30 DIAGNOSIS — R10.9 UNSPECIFIED ABDOMINAL PAIN: ICD-10-CM

## 2024-06-30 LAB
ALBUMIN SERPL ELPH-MCNC: 3.8 G/DL — SIGNIFICANT CHANGE UP (ref 3.3–5)
ALP SERPL-CCNC: 53 U/L — SIGNIFICANT CHANGE UP (ref 40–120)
ALT FLD-CCNC: 53 U/L — SIGNIFICANT CHANGE UP (ref 12–78)
ANION GAP SERPL CALC-SCNC: 15 MMOL/L — SIGNIFICANT CHANGE UP (ref 5–17)
ANISOCYTOSIS BLD QL: SLIGHT — SIGNIFICANT CHANGE UP
AST SERPL-CCNC: 101 U/L — HIGH (ref 15–37)
BASOPHILS # BLD AUTO: 0 K/UL — SIGNIFICANT CHANGE UP (ref 0–0.2)
BASOPHILS NFR BLD AUTO: 0 % — SIGNIFICANT CHANGE UP (ref 0–2)
BILIRUB SERPL-MCNC: 0.4 MG/DL — SIGNIFICANT CHANGE UP (ref 0.2–1.2)
BLD GP AB SCN SERPL QL: SIGNIFICANT CHANGE UP
BUN SERPL-MCNC: 13 MG/DL — SIGNIFICANT CHANGE UP (ref 7–23)
CALCIUM SERPL-MCNC: 8.7 MG/DL — SIGNIFICANT CHANGE UP (ref 8.5–10.1)
CHLORIDE SERPL-SCNC: 108 MMOL/L — SIGNIFICANT CHANGE UP (ref 96–108)
CO2 SERPL-SCNC: 19 MMOL/L — LOW (ref 22–31)
CREAT SERPL-MCNC: 0.96 MG/DL — SIGNIFICANT CHANGE UP (ref 0.5–1.3)
EGFR: 92 ML/MIN/1.73M2 — SIGNIFICANT CHANGE UP
EOSINOPHIL # BLD AUTO: 0 K/UL — SIGNIFICANT CHANGE UP (ref 0–0.5)
EOSINOPHIL NFR BLD AUTO: 0 % — SIGNIFICANT CHANGE UP (ref 0–6)
ETHANOL SERPL-MCNC: 293 MG/DL — HIGH (ref 0–10)
GLUCOSE SERPL-MCNC: 72 MG/DL — SIGNIFICANT CHANGE UP (ref 70–99)
HCT VFR BLD CALC: 35.9 % — LOW (ref 39–50)
HGB BLD-MCNC: 11.2 G/DL — LOW (ref 13–17)
LACTATE SERPL-SCNC: 5.7 MMOL/L — CRITICAL HIGH (ref 0.7–2)
LG PLATELETS BLD QL AUTO: SLIGHT — SIGNIFICANT CHANGE UP
LIDOCAIN IGE QN: 72 U/L — SIGNIFICANT CHANGE UP (ref 13–75)
LYMPHOCYTES # BLD AUTO: 1.24 K/UL — SIGNIFICANT CHANGE UP (ref 1–3.3)
LYMPHOCYTES # BLD AUTO: 48 % — HIGH (ref 13–44)
MACROCYTES BLD QL: SLIGHT — SIGNIFICANT CHANGE UP
MANUAL SMEAR VERIFICATION: SIGNIFICANT CHANGE UP
MCHC RBC-ENTMCNC: 23.2 PG — LOW (ref 27–34)
MCHC RBC-ENTMCNC: 31.2 G/DL — LOW (ref 32–36)
MCV RBC AUTO: 74.5 FL — LOW (ref 80–100)
MONOCYTES # BLD AUTO: 0.1 K/UL — SIGNIFICANT CHANGE UP (ref 0–0.9)
MONOCYTES NFR BLD AUTO: 4 % — SIGNIFICANT CHANGE UP (ref 2–14)
NEUTROPHILS # BLD AUTO: 1.06 K/UL — LOW (ref 1.8–7.4)
NEUTROPHILS NFR BLD AUTO: 41 % — LOW (ref 43–77)
NRBC # BLD: 0 /100 WBCS — SIGNIFICANT CHANGE UP (ref 0–0)
NRBC # BLD: SIGNIFICANT CHANGE UP /100 WBCS (ref 0–0)
OVALOCYTES BLD QL SMEAR: SLIGHT — SIGNIFICANT CHANGE UP
PLAT MORPH BLD: NORMAL — SIGNIFICANT CHANGE UP
PLATELET # BLD AUTO: 132 K/UL — LOW (ref 150–400)
PLATELET COUNT - ESTIMATE: ABNORMAL
POIKILOCYTOSIS BLD QL AUTO: SLIGHT — SIGNIFICANT CHANGE UP
POTASSIUM SERPL-MCNC: 4.2 MMOL/L — SIGNIFICANT CHANGE UP (ref 3.5–5.3)
POTASSIUM SERPL-SCNC: 4.2 MMOL/L — SIGNIFICANT CHANGE UP (ref 3.5–5.3)
PROT SERPL-MCNC: 7.9 GM/DL — SIGNIFICANT CHANGE UP (ref 6–8.3)
RBC # BLD: 4.82 M/UL — SIGNIFICANT CHANGE UP (ref 4.2–5.8)
RBC # FLD: 20.9 % — HIGH (ref 10.3–14.5)
RBC BLD AUTO: SIGNIFICANT CHANGE UP
SMUDGE CELLS # BLD: PRESENT — SIGNIFICANT CHANGE UP
SODIUM SERPL-SCNC: 142 MMOL/L — SIGNIFICANT CHANGE UP (ref 135–145)
TROPONIN I, HIGH SENSITIVITY RESULT: 8.1 NG/L — SIGNIFICANT CHANGE UP
VARIANT LYMPHS # BLD: 7 % — HIGH (ref 0–6)
WBC # BLD: 2.58 K/UL — LOW (ref 3.8–10.5)
WBC # FLD AUTO: 2.58 K/UL — LOW (ref 3.8–10.5)

## 2024-06-30 PROCEDURE — 99285 EMERGENCY DEPT VISIT HI MDM: CPT

## 2024-06-30 RX ORDER — SODIUM CHLORIDE 0.9 % (FLUSH) 0.9 %
1000 SYRINGE (ML) INJECTION ONCE
Refills: 0 | Status: COMPLETED | OUTPATIENT
Start: 2024-06-30 | End: 2024-06-30

## 2024-06-30 RX ORDER — ACETAMINOPHEN 325 MG
975 TABLET ORAL ONCE
Refills: 0 | Status: COMPLETED | OUTPATIENT
Start: 2024-06-30 | End: 2024-06-30

## 2024-06-30 RX ORDER — IOHEXOL 350 MG/ML
30 INJECTION, SOLUTION INTRAVENOUS ONCE
Refills: 0 | Status: COMPLETED | OUTPATIENT
Start: 2024-06-30 | End: 2024-06-30

## 2024-06-30 RX ADMIN — Medication 1000 MILLILITER(S): at 20:44

## 2024-07-03 ENCOUNTER — EMERGENCY (EMERGENCY)
Facility: HOSPITAL | Age: 59
LOS: 0 days | Discharge: HOME | End: 2024-07-04
Attending: STUDENT IN AN ORGANIZED HEALTH CARE EDUCATION/TRAINING PROGRAM
Payer: MEDICAID

## 2024-07-03 VITALS
TEMPERATURE: 98 F | OXYGEN SATURATION: 98 % | DIASTOLIC BLOOD PRESSURE: 97 MMHG | RESPIRATION RATE: 18 BRPM | SYSTOLIC BLOOD PRESSURE: 144 MMHG | HEIGHT: 70 IN | WEIGHT: 179.9 LBS | HEART RATE: 106 BPM

## 2024-07-03 DIAGNOSIS — R10.9 UNSPECIFIED ABDOMINAL PAIN: ICD-10-CM

## 2024-07-03 DIAGNOSIS — R00.0 TACHYCARDIA, UNSPECIFIED: ICD-10-CM

## 2024-07-03 DIAGNOSIS — F10.129 ALCOHOL ABUSE WITH INTOXICATION, UNSPECIFIED: ICD-10-CM
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
## 2024-07-03 LAB
ALBUMIN SERPL ELPH-MCNC: 3.9 G/DL — SIGNIFICANT CHANGE UP (ref 3.3–5)
ALP SERPL-CCNC: 60 U/L — SIGNIFICANT CHANGE UP (ref 40–120)
ALT FLD-CCNC: 95 U/L — HIGH (ref 12–78)
ANION GAP SERPL CALC-SCNC: 11 MMOL/L — SIGNIFICANT CHANGE UP (ref 5–17)
AST SERPL-CCNC: 210 U/L — HIGH (ref 15–37)
BASOPHILS # BLD AUTO: 0.02 K/UL — SIGNIFICANT CHANGE UP (ref 0–0.2)
BASOPHILS NFR BLD AUTO: 0.8 % — SIGNIFICANT CHANGE UP (ref 0–2)
BILIRUB SERPL-MCNC: 0.5 MG/DL — SIGNIFICANT CHANGE UP (ref 0.2–1.2)
BUN SERPL-MCNC: 12 MG/DL — SIGNIFICANT CHANGE UP (ref 7–23)
CALCIUM SERPL-MCNC: 9 MG/DL — SIGNIFICANT CHANGE UP (ref 8.5–10.1)
CHLORIDE SERPL-SCNC: 103 MMOL/L — SIGNIFICANT CHANGE UP (ref 96–108)
CO2 SERPL-SCNC: 27 MMOL/L — SIGNIFICANT CHANGE UP (ref 22–31)
CREAT SERPL-MCNC: 1.03 MG/DL — SIGNIFICANT CHANGE UP (ref 0.5–1.3)
EGFR: 85 ML/MIN/1.73M2 — SIGNIFICANT CHANGE UP
EOSINOPHIL # BLD AUTO: 0.05 K/UL — SIGNIFICANT CHANGE UP (ref 0–0.5)
EOSINOPHIL NFR BLD AUTO: 2.1 % — SIGNIFICANT CHANGE UP (ref 0–6)
ETHANOL SERPL-MCNC: 356 MG/DL — HIGH (ref 0–10)
GLUCOSE SERPL-MCNC: 93 MG/DL — SIGNIFICANT CHANGE UP (ref 70–99)
HCT VFR BLD CALC: 39.1 % — SIGNIFICANT CHANGE UP (ref 39–50)
HGB BLD-MCNC: 12.1 G/DL — LOW (ref 13–17)
IMM GRANULOCYTES NFR BLD AUTO: 0 % — SIGNIFICANT CHANGE UP (ref 0–0.9)
LIDOCAIN IGE QN: 80 U/L — HIGH (ref 13–75)
LYMPHOCYTES # BLD AUTO: 1.37 K/UL — SIGNIFICANT CHANGE UP (ref 1–3.3)
LYMPHOCYTES # BLD AUTO: 56.8 % — HIGH (ref 13–44)
MCHC RBC-ENTMCNC: 23 PG — LOW (ref 27–34)
MCHC RBC-ENTMCNC: 30.9 G/DL — LOW (ref 32–36)
MCV RBC AUTO: 74.5 FL — LOW (ref 80–100)
MONOCYTES # BLD AUTO: 0.23 K/UL — SIGNIFICANT CHANGE UP (ref 0–0.9)
MONOCYTES NFR BLD AUTO: 9.5 % — SIGNIFICANT CHANGE UP (ref 2–14)
NEUTROPHILS # BLD AUTO: 0.74 K/UL — LOW (ref 1.8–7.4)
NEUTROPHILS NFR BLD AUTO: 30.8 % — LOW (ref 43–77)
NRBC # BLD: 0 /100 WBCS — SIGNIFICANT CHANGE UP (ref 0–0)
PLATELET # BLD AUTO: 146 K/UL — LOW (ref 150–400)
POTASSIUM SERPL-MCNC: 4 MMOL/L — SIGNIFICANT CHANGE UP (ref 3.5–5.3)
POTASSIUM SERPL-SCNC: 4 MMOL/L — SIGNIFICANT CHANGE UP (ref 3.5–5.3)
PROT SERPL-MCNC: 8.3 GM/DL — SIGNIFICANT CHANGE UP (ref 6–8.3)
RBC # BLD: 5.25 M/UL — SIGNIFICANT CHANGE UP (ref 4.2–5.8)
RBC # FLD: 21.3 % — HIGH (ref 10.3–14.5)
SODIUM SERPL-SCNC: 141 MMOL/L — SIGNIFICANT CHANGE UP (ref 135–145)
WBC # BLD: 2.41 K/UL — LOW (ref 3.8–10.5)
WBC # FLD AUTO: 2.41 K/UL — LOW (ref 3.8–10.5)

## 2024-07-03 PROCEDURE — 36000 PLACE NEEDLE IN VEIN: CPT

## 2024-07-03 PROCEDURE — 99284 EMERGENCY DEPT VISIT MOD MDM: CPT | Mod: 25

## 2024-07-03 RX ORDER — OXYCODONE HYDROCHLORIDE 100 MG/5ML
5 SOLUTION ORAL ONCE
Refills: 0 | Status: DISCONTINUED | OUTPATIENT
Start: 2024-07-03 | End: 2024-07-03

## 2024-07-03 RX ORDER — HYDROMORPHONE HCL 0.2 MG/ML
0.5 INJECTION, SOLUTION INTRAVENOUS ONCE
Refills: 0 | Status: DISCONTINUED | OUTPATIENT
Start: 2024-07-03 | End: 2024-07-03

## 2024-07-03 RX ORDER — HALOPERIDOL DECANOATE 100 MG/ML
5 VIAL (ML) INTRAMUSCULAR ONCE
Refills: 0 | Status: COMPLETED | OUTPATIENT
Start: 2024-07-03 | End: 2024-07-03

## 2024-07-03 RX ORDER — ONDANSETRON HYDROCHLORIDE 2 MG/ML
4 INJECTION INTRAMUSCULAR; INTRAVENOUS ONCE
Refills: 0 | Status: COMPLETED | OUTPATIENT
Start: 2024-07-03 | End: 2024-07-03

## 2024-07-03 RX ORDER — LORAZEPAM 0.5 MG
2 TABLET ORAL ONCE
Refills: 0 | Status: DISCONTINUED | OUTPATIENT
Start: 2024-07-03 | End: 2024-07-03

## 2024-07-03 RX ADMIN — HYDROMORPHONE HCL 0.5 MILLIGRAM(S): 0.2 INJECTION, SOLUTION INTRAVENOUS at 18:18

## 2024-07-03 RX ADMIN — ONDANSETRON HYDROCHLORIDE 4 MILLIGRAM(S): 2 INJECTION INTRAMUSCULAR; INTRAVENOUS at 19:17

## 2024-07-03 RX ADMIN — OXYCODONE HYDROCHLORIDE 5 MILLIGRAM(S): 100 SOLUTION ORAL at 18:40

## 2024-07-03 RX ADMIN — Medication 2 MILLIGRAM(S): at 19:30

## 2024-07-03 RX ADMIN — Medication 5 MILLIGRAM(S): at 19:30

## 2024-07-03 RX ADMIN — OXYCODONE HYDROCHLORIDE 5 MILLIGRAM(S): 100 SOLUTION ORAL at 19:40

## 2024-07-04 VITALS
SYSTOLIC BLOOD PRESSURE: 112 MMHG | HEART RATE: 76 BPM | TEMPERATURE: 98 F | OXYGEN SATURATION: 98 % | RESPIRATION RATE: 16 BRPM | DIASTOLIC BLOOD PRESSURE: 68 MMHG

## 2024-07-04 RX ORDER — ACETAMINOPHEN 325 MG
650 TABLET ORAL ONCE
Refills: 0 | Status: COMPLETED | OUTPATIENT
Start: 2024-07-04 | End: 2024-07-04

## 2024-07-04 RX ADMIN — Medication 650 MILLIGRAM(S): at 06:17

## 2024-07-07 ENCOUNTER — EMERGENCY (EMERGENCY)
Facility: HOSPITAL | Age: 59
LOS: 1 days | Discharge: LEFT BEFORE DISCHARGE | End: 2024-07-09
Attending: EMERGENCY MEDICINE | Admitting: INTERNAL MEDICINE
Payer: MEDICAID

## 2024-07-07 VITALS
HEART RATE: 89 BPM | TEMPERATURE: 99 F | DIASTOLIC BLOOD PRESSURE: 100 MMHG | RESPIRATION RATE: 18 BRPM | SYSTOLIC BLOOD PRESSURE: 133 MMHG | OXYGEN SATURATION: 98 % | WEIGHT: 225.09 LBS | HEIGHT: 70 IN

## 2024-07-07 DIAGNOSIS — K27.9 PEPTIC ULCER, SITE UNSPECIFIED, UNSPECIFIED AS ACUTE OR CHRONIC, WITHOUT HEMORRHAGE OR PERFORATION: ICD-10-CM

## 2024-07-07 DIAGNOSIS — K29.20 ALCOHOLIC GASTRITIS WITHOUT BLEEDING: ICD-10-CM

## 2024-07-07 DIAGNOSIS — R11.2 NAUSEA WITH VOMITING, UNSPECIFIED: ICD-10-CM
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
## 2024-07-07 DIAGNOSIS — Z91.199 PATIENT'S NONCOMPLIANCE WITH OTHER MEDICAL TREATMENT AND REGIMEN DUE TO UNSPECIFIED REASON: ICD-10-CM

## 2024-07-07 DIAGNOSIS — E11.65 TYPE 2 DIABETES MELLITUS WITH HYPERGLYCEMIA: ICD-10-CM

## 2024-07-07 DIAGNOSIS — F10.229 ALCOHOL DEPENDENCE WITH INTOXICATION, UNSPECIFIED: ICD-10-CM

## 2024-07-07 DIAGNOSIS — F10.239 ALCOHOL DEPENDENCE WITH WITHDRAWAL, UNSPECIFIED: ICD-10-CM

## 2024-07-07 DIAGNOSIS — R10.11 RIGHT UPPER QUADRANT PAIN: ICD-10-CM

## 2024-07-07 LAB
ALBUMIN SERPL ELPH-MCNC: 4.2 G/DL — SIGNIFICANT CHANGE UP (ref 3.3–5)
ALP SERPL-CCNC: 65 U/L — SIGNIFICANT CHANGE UP (ref 40–120)
ALT FLD-CCNC: 110 U/L — HIGH (ref 12–78)
ANION GAP SERPL CALC-SCNC: 9 MMOL/L — SIGNIFICANT CHANGE UP (ref 5–17)
ANISOCYTOSIS BLD QL: SIGNIFICANT CHANGE UP
AST SERPL-CCNC: 197 U/L — HIGH (ref 15–37)
BASOPHILS # BLD AUTO: 0.04 K/UL — SIGNIFICANT CHANGE UP (ref 0–0.2)
BASOPHILS NFR BLD AUTO: 1 % — SIGNIFICANT CHANGE UP (ref 0–2)
BILIRUB SERPL-MCNC: 0.5 MG/DL — SIGNIFICANT CHANGE UP (ref 0.2–1.2)
BUN SERPL-MCNC: 13 MG/DL — SIGNIFICANT CHANGE UP (ref 7–23)
CALCIUM SERPL-MCNC: 9.7 MG/DL — SIGNIFICANT CHANGE UP (ref 8.5–10.1)
CHLORIDE SERPL-SCNC: 102 MMOL/L — SIGNIFICANT CHANGE UP (ref 96–108)
CO2 SERPL-SCNC: 30 MMOL/L — SIGNIFICANT CHANGE UP (ref 22–31)
CREAT SERPL-MCNC: 1.07 MG/DL — SIGNIFICANT CHANGE UP (ref 0.5–1.3)
EGFR: 81 ML/MIN/1.73M2 — SIGNIFICANT CHANGE UP
EOSINOPHIL # BLD AUTO: 0.07 K/UL — SIGNIFICANT CHANGE UP (ref 0–0.5)
EOSINOPHIL NFR BLD AUTO: 1.7 % — SIGNIFICANT CHANGE UP (ref 0–6)
ETHANOL SERPL-MCNC: 362 MG/DL — HIGH (ref 0–10)
GLUCOSE SERPL-MCNC: 89 MG/DL — SIGNIFICANT CHANGE UP (ref 70–99)
HCT VFR BLD CALC: 39.9 % — SIGNIFICANT CHANGE UP (ref 39–50)
HGB BLD-MCNC: 12.7 G/DL — LOW (ref 13–17)
HYPOCHROMIA BLD QL: SLIGHT — SIGNIFICANT CHANGE UP
IMM GRANULOCYTES NFR BLD AUTO: 0.2 % — SIGNIFICANT CHANGE UP (ref 0–0.9)
LACTATE SERPL-SCNC: 4.2 MMOL/L — CRITICAL HIGH (ref 0.7–2)
LACTATE SERPL-SCNC: 4.6 MMOL/L — CRITICAL HIGH (ref 0.7–2)
LIDOCAIN IGE QN: 105 U/L — HIGH (ref 13–75)
LYMPHOCYTES # BLD AUTO: 1.59 K/UL — SIGNIFICANT CHANGE UP (ref 1–3.3)
LYMPHOCYTES # BLD AUTO: 39.6 % — SIGNIFICANT CHANGE UP (ref 13–44)
MACROCYTES BLD QL: SLIGHT — SIGNIFICANT CHANGE UP
MANUAL SMEAR VERIFICATION: SIGNIFICANT CHANGE UP
MCHC RBC-ENTMCNC: 23.8 PG — LOW (ref 27–34)
MCHC RBC-ENTMCNC: 31.8 G/DL — LOW (ref 32–36)
MCV RBC AUTO: 74.7 FL — LOW (ref 80–100)
MICROCYTES BLD QL: SIGNIFICANT CHANGE UP
MONOCYTES # BLD AUTO: 0.31 K/UL — SIGNIFICANT CHANGE UP (ref 0–0.9)
MONOCYTES NFR BLD AUTO: 7.7 % — SIGNIFICANT CHANGE UP (ref 2–14)
NEUTROPHILS # BLD AUTO: 2 K/UL — SIGNIFICANT CHANGE UP (ref 1.8–7.4)
NEUTROPHILS NFR BLD AUTO: 49.8 % — SIGNIFICANT CHANGE UP (ref 43–77)
NRBC # BLD: 0 /100 WBCS — SIGNIFICANT CHANGE UP (ref 0–0)
OVALOCYTES BLD QL SMEAR: SLIGHT — SIGNIFICANT CHANGE UP
PLAT MORPH BLD: NORMAL — SIGNIFICANT CHANGE UP
PLATELET # BLD AUTO: 127 K/UL — LOW (ref 150–400)
PLATELET CLUMP BLD QL SMEAR: SLIGHT
POIKILOCYTOSIS BLD QL AUTO: SLIGHT — SIGNIFICANT CHANGE UP
POLYCHROMASIA BLD QL SMEAR: SLIGHT — SIGNIFICANT CHANGE UP
POTASSIUM SERPL-MCNC: 3.8 MMOL/L — SIGNIFICANT CHANGE UP (ref 3.5–5.3)
POTASSIUM SERPL-SCNC: 3.8 MMOL/L — SIGNIFICANT CHANGE UP (ref 3.5–5.3)
PROT SERPL-MCNC: 8.6 GM/DL — HIGH (ref 6–8.3)
RBC # BLD: 5.34 M/UL — SIGNIFICANT CHANGE UP (ref 4.2–5.8)
RBC # FLD: 22.9 % — HIGH (ref 10.3–14.5)
RBC BLD AUTO: ABNORMAL
SODIUM SERPL-SCNC: 141 MMOL/L — SIGNIFICANT CHANGE UP (ref 135–145)
TARGETS BLD QL SMEAR: SLIGHT — SIGNIFICANT CHANGE UP
WBC # BLD: 4.02 K/UL — SIGNIFICANT CHANGE UP (ref 3.8–10.5)
WBC # FLD AUTO: 4.02 K/UL — SIGNIFICANT CHANGE UP (ref 3.8–10.5)

## 2024-07-07 PROCEDURE — 36000 PLACE NEEDLE IN VEIN: CPT

## 2024-07-07 PROCEDURE — 99223 1ST HOSP IP/OBS HIGH 75: CPT

## 2024-07-07 PROCEDURE — 71045 X-RAY EXAM CHEST 1 VIEW: CPT | Mod: 26

## 2024-07-07 PROCEDURE — 99285 EMERGENCY DEPT VISIT HI MDM: CPT | Mod: 25

## 2024-07-07 PROCEDURE — 74176 CT ABD & PELVIS W/O CONTRAST: CPT | Mod: 26,MC

## 2024-07-07 PROCEDURE — 93010 ELECTROCARDIOGRAM REPORT: CPT

## 2024-07-07 RX ORDER — CYANOCOBALAMIN (VITAMIN B-12) 1000 MCG
1000 TABLET, EXTENDED RELEASE ORAL DAILY
Refills: 0 | Status: DISCONTINUED | OUTPATIENT
Start: 2024-07-07 | End: 2024-07-09

## 2024-07-07 RX ORDER — THIAMINE HCL 100 MG
100 TABLET ORAL ONCE
Refills: 0 | Status: COMPLETED | OUTPATIENT
Start: 2024-07-07 | End: 2024-07-08

## 2024-07-07 RX ORDER — SODIUM CHLORIDE 0.9 % (FLUSH) 0.9 %
1000 SYRINGE (ML) INJECTION ONCE
Refills: 0 | Status: COMPLETED | OUTPATIENT
Start: 2024-07-07 | End: 2024-07-07

## 2024-07-07 RX ORDER — SUCRALFATE 1 G
1 TABLET ORAL
Refills: 0 | Status: DISCONTINUED | OUTPATIENT
Start: 2024-07-07 | End: 2024-07-09

## 2024-07-07 RX ORDER — ZOLPIDEM TARTRATE 10 MG
5 TABLET ORAL AT BEDTIME
Refills: 0 | Status: DISCONTINUED | OUTPATIENT
Start: 2024-07-07 | End: 2024-07-07

## 2024-07-07 RX ORDER — LORAZEPAM 0.5 MG
2 TABLET ORAL
Refills: 0 | Status: DISCONTINUED | OUTPATIENT
Start: 2024-07-07 | End: 2024-07-09

## 2024-07-07 RX ORDER — DEXTROSE MONOHYDRATE AND SODIUM CHLORIDE 5; .3 G/100ML; G/100ML
1000 INJECTION, SOLUTION INTRAVENOUS
Refills: 0 | Status: DISCONTINUED | OUTPATIENT
Start: 2024-07-07 | End: 2024-07-08

## 2024-07-07 RX ORDER — CHLORDIAZEPOXIDE HYDROCHLORIDE 10 MG/1
50 CAPSULE ORAL EVERY 6 HOURS
Refills: 0 | Status: DISCONTINUED | OUTPATIENT
Start: 2024-07-07 | End: 2024-07-08

## 2024-07-07 RX ORDER — LORAZEPAM 0.5 MG
2 TABLET ORAL ONCE
Refills: 0 | Status: DISCONTINUED | OUTPATIENT
Start: 2024-07-07 | End: 2024-07-07

## 2024-07-07 RX ORDER — CHLORDIAZEPOXIDE HYDROCHLORIDE 10 MG/1
50 CAPSULE ORAL EVERY 24 HOURS
Refills: 0 | Status: CANCELLED | OUTPATIENT
Start: 2024-07-12 | End: 2024-07-09

## 2024-07-07 RX ORDER — FOLIC ACID
1 POWDER (GRAM) MISCELLANEOUS DAILY
Refills: 0 | Status: DISCONTINUED | OUTPATIENT
Start: 2024-07-07 | End: 2024-07-09

## 2024-07-07 RX ORDER — CHLORDIAZEPOXIDE HYDROCHLORIDE 10 MG/1
50 CAPSULE ORAL EVERY 12 HOURS
Refills: 0 | Status: DISCONTINUED | OUTPATIENT
Start: 2024-07-10 | End: 2024-07-09

## 2024-07-07 RX ORDER — CALCIUM CARBONATE 500(1250)
3 TABLET,CHEWABLE ORAL EVERY 6 HOURS
Refills: 0 | Status: DISCONTINUED | OUTPATIENT
Start: 2024-07-07 | End: 2024-07-09

## 2024-07-07 RX ORDER — ONDANSETRON HYDROCHLORIDE 2 MG/ML
8 INJECTION INTRAMUSCULAR; INTRAVENOUS
Refills: 0 | Status: DISCONTINUED | OUTPATIENT
Start: 2024-07-07 | End: 2024-07-07

## 2024-07-07 RX ORDER — CHLORDIAZEPOXIDE HYDROCHLORIDE 10 MG/1
50 CAPSULE ORAL EVERY 8 HOURS
Refills: 0 | Status: COMPLETED | OUTPATIENT
Start: 2024-07-09 | End: 2024-07-09

## 2024-07-07 RX ORDER — CHLORDIAZEPOXIDE HYDROCHLORIDE 10 MG/1
CAPSULE ORAL
Refills: 0 | Status: DISCONTINUED | OUTPATIENT
Start: 2024-07-08 | End: 2024-07-09

## 2024-07-07 RX ORDER — FAMOTIDINE 40 MG
20 TABLET ORAL ONCE
Refills: 0 | Status: COMPLETED | OUTPATIENT
Start: 2024-07-07 | End: 2024-07-07

## 2024-07-07 RX ORDER — MORPHINE SULFATE 100 MG/1
2 TABLET, EXTENDED RELEASE ORAL ONCE
Refills: 0 | Status: DISCONTINUED | OUTPATIENT
Start: 2024-07-07 | End: 2024-07-07

## 2024-07-07 RX ORDER — SENNOSIDES 8.6 MG
2 TABLET ORAL AT BEDTIME
Refills: 0 | Status: DISCONTINUED | OUTPATIENT
Start: 2024-07-07 | End: 2024-07-09

## 2024-07-07 RX ORDER — MAGNESIUM, ALUMINUM HYDROXIDE 400-400
30 TABLET,CHEWABLE ORAL ONCE
Refills: 0 | Status: COMPLETED | OUTPATIENT
Start: 2024-07-07 | End: 2024-07-07

## 2024-07-07 RX ORDER — MAGNESIUM, ALUMINUM HYDROXIDE 400-400
30 TABLET,CHEWABLE ORAL EVERY 4 HOURS
Refills: 0 | Status: DISCONTINUED | OUTPATIENT
Start: 2024-07-07 | End: 2024-07-09

## 2024-07-07 RX ORDER — THIAMINE HCL 100 MG
100 TABLET ORAL DAILY
Refills: 0 | Status: DISCONTINUED | OUTPATIENT
Start: 2024-07-07 | End: 2024-07-09

## 2024-07-07 RX ORDER — BISACODYL 5 MG
5 TABLET, DELAYED RELEASE (ENTERIC COATED) ORAL DAILY
Refills: 0 | Status: DISCONTINUED | OUTPATIENT
Start: 2024-07-07 | End: 2024-07-09

## 2024-07-07 RX ORDER — ONDANSETRON HYDROCHLORIDE 2 MG/ML
4 INJECTION INTRAMUSCULAR; INTRAVENOUS EVERY 6 HOURS
Refills: 0 | Status: DISCONTINUED | OUTPATIENT
Start: 2024-07-07 | End: 2024-07-09

## 2024-07-07 RX ORDER — PANTOPRAZOLE SODIUM 40 MG/10ML
40 INJECTION, POWDER, FOR SOLUTION INTRAVENOUS
Refills: 0 | Status: DISCONTINUED | OUTPATIENT
Start: 2024-07-07 | End: 2024-07-08

## 2024-07-07 RX ORDER — ONDANSETRON HYDROCHLORIDE 2 MG/ML
4 INJECTION INTRAMUSCULAR; INTRAVENOUS ONCE
Refills: 0 | Status: COMPLETED | OUTPATIENT
Start: 2024-07-07 | End: 2024-07-07

## 2024-07-07 RX ADMIN — Medication 30 MILLILITER(S): at 20:21

## 2024-07-07 RX ADMIN — Medication 2 MILLIGRAM(S): at 20:44

## 2024-07-07 RX ADMIN — Medication 1000 MILLILITER(S): at 19:47

## 2024-07-07 RX ADMIN — ONDANSETRON HYDROCHLORIDE 4 MILLIGRAM(S): 2 INJECTION INTRAMUSCULAR; INTRAVENOUS at 19:47

## 2024-07-07 RX ADMIN — MORPHINE SULFATE 2 MILLIGRAM(S): 100 TABLET, EXTENDED RELEASE ORAL at 20:21

## 2024-07-07 RX ADMIN — MORPHINE SULFATE 2 MILLIGRAM(S): 100 TABLET, EXTENDED RELEASE ORAL at 20:51

## 2024-07-07 RX ADMIN — Medication 20 MILLIGRAM(S): at 20:21

## 2024-07-08 DIAGNOSIS — E11.65 TYPE 2 DIABETES MELLITUS WITH HYPERGLYCEMIA: ICD-10-CM

## 2024-07-08 DIAGNOSIS — F10.239 ALCOHOL DEPENDENCE WITH WITHDRAWAL, UNSPECIFIED: ICD-10-CM

## 2024-07-08 DIAGNOSIS — Z29.9 ENCOUNTER FOR PROPHYLACTIC MEASURES, UNSPECIFIED: ICD-10-CM

## 2024-07-08 DIAGNOSIS — K29.70 GASTRITIS, UNSPECIFIED, WITHOUT BLEEDING: ICD-10-CM

## 2024-07-08 LAB
ALBUMIN SERPL ELPH-MCNC: 3.5 G/DL — SIGNIFICANT CHANGE UP (ref 3.3–5)
ALP SERPL-CCNC: 54 U/L — SIGNIFICANT CHANGE UP (ref 40–120)
ALT FLD-CCNC: 86 U/L — HIGH (ref 12–78)
ANION GAP SERPL CALC-SCNC: 9 MMOL/L — SIGNIFICANT CHANGE UP (ref 5–17)
AST SERPL-CCNC: 143 U/L — HIGH (ref 15–37)
BASOPHILS # BLD AUTO: 0.03 K/UL — SIGNIFICANT CHANGE UP (ref 0–0.2)
BASOPHILS NFR BLD AUTO: 1 % — SIGNIFICANT CHANGE UP (ref 0–2)
BILIRUB DIRECT SERPL-MCNC: 0.1 MG/DL — SIGNIFICANT CHANGE UP (ref 0–0.3)
BILIRUB INDIRECT FLD-MCNC: 0.5 MG/DL — SIGNIFICANT CHANGE UP (ref 0.2–1)
BILIRUB SERPL-MCNC: 0.6 MG/DL — SIGNIFICANT CHANGE UP (ref 0.2–1.2)
BUN SERPL-MCNC: 14 MG/DL — SIGNIFICANT CHANGE UP (ref 7–23)
CALCIUM SERPL-MCNC: 8.9 MG/DL — SIGNIFICANT CHANGE UP (ref 8.5–10.1)
CHLORIDE SERPL-SCNC: 105 MMOL/L — SIGNIFICANT CHANGE UP (ref 96–108)
CK SERPL-CCNC: 761 U/L — HIGH (ref 26–308)
CO2 SERPL-SCNC: 25 MMOL/L — SIGNIFICANT CHANGE UP (ref 22–31)
CREAT SERPL-MCNC: 0.91 MG/DL — SIGNIFICANT CHANGE UP (ref 0.5–1.3)
EGFR: 98 ML/MIN/1.73M2 — SIGNIFICANT CHANGE UP
EOSINOPHIL # BLD AUTO: 0.12 K/UL — SIGNIFICANT CHANGE UP (ref 0–0.5)
EOSINOPHIL NFR BLD AUTO: 3.9 % — SIGNIFICANT CHANGE UP (ref 0–6)
GLUCOSE BLDC GLUCOMTR-MCNC: 104 MG/DL — HIGH (ref 70–99)
GLUCOSE BLDC GLUCOMTR-MCNC: 105 MG/DL — HIGH (ref 70–99)
GLUCOSE BLDC GLUCOMTR-MCNC: 79 MG/DL — SIGNIFICANT CHANGE UP (ref 70–99)
GLUCOSE BLDC GLUCOMTR-MCNC: 83 MG/DL — SIGNIFICANT CHANGE UP (ref 70–99)
GLUCOSE BLDC GLUCOMTR-MCNC: 88 MG/DL — SIGNIFICANT CHANGE UP (ref 70–99)
GLUCOSE SERPL-MCNC: 70 MG/DL — SIGNIFICANT CHANGE UP (ref 70–99)
HCT VFR BLD CALC: 34.7 % — LOW (ref 39–50)
HGB BLD-MCNC: 11.1 G/DL — LOW (ref 13–17)
IMM GRANULOCYTES NFR BLD AUTO: 0.3 % — SIGNIFICANT CHANGE UP (ref 0–0.9)
LACTATE SERPL-SCNC: 4 MMOL/L — CRITICAL HIGH (ref 0.7–2)
LYMPHOCYTES # BLD AUTO: 1.29 K/UL — SIGNIFICANT CHANGE UP (ref 1–3.3)
LYMPHOCYTES # BLD AUTO: 41.6 % — SIGNIFICANT CHANGE UP (ref 13–44)
MAGNESIUM SERPL-MCNC: 2 MG/DL — SIGNIFICANT CHANGE UP (ref 1.6–2.6)
MCHC RBC-ENTMCNC: 23.9 PG — LOW (ref 27–34)
MCHC RBC-ENTMCNC: 32 G/DL — SIGNIFICANT CHANGE UP (ref 32–36)
MCV RBC AUTO: 74.8 FL — LOW (ref 80–100)
MONOCYTES # BLD AUTO: 0.32 K/UL — SIGNIFICANT CHANGE UP (ref 0–0.9)
MONOCYTES NFR BLD AUTO: 10.3 % — SIGNIFICANT CHANGE UP (ref 2–14)
NEUTROPHILS # BLD AUTO: 1.33 K/UL — LOW (ref 1.8–7.4)
NEUTROPHILS NFR BLD AUTO: 42.9 % — LOW (ref 43–77)
NRBC # BLD: 0 /100 WBCS — SIGNIFICANT CHANGE UP (ref 0–0)
PHOSPHATE SERPL-MCNC: 2.5 MG/DL — SIGNIFICANT CHANGE UP (ref 2.5–4.5)
PLATELET # BLD AUTO: 105 K/UL — LOW (ref 150–400)
POTASSIUM SERPL-MCNC: 3.5 MMOL/L — SIGNIFICANT CHANGE UP (ref 3.5–5.3)
POTASSIUM SERPL-SCNC: 3.5 MMOL/L — SIGNIFICANT CHANGE UP (ref 3.5–5.3)
PROT SERPL-MCNC: 7.5 GM/DL — SIGNIFICANT CHANGE UP (ref 6–8.3)
RBC # BLD: 4.64 M/UL — SIGNIFICANT CHANGE UP (ref 4.2–5.8)
RBC # FLD: 22.9 % — HIGH (ref 10.3–14.5)
SODIUM SERPL-SCNC: 139 MMOL/L — SIGNIFICANT CHANGE UP (ref 135–145)
TROPONIN I, HIGH SENSITIVITY RESULT: 11.1 NG/L — SIGNIFICANT CHANGE UP
WBC # BLD: 3.1 K/UL — LOW (ref 3.8–10.5)
WBC # FLD AUTO: 3.1 K/UL — LOW (ref 3.8–10.5)

## 2024-07-08 PROCEDURE — 93010 ELECTROCARDIOGRAM REPORT: CPT

## 2024-07-08 PROCEDURE — 12345: CPT | Mod: NC

## 2024-07-08 RX ORDER — HEPARIN SODIUM 50 [USP'U]/ML
5000 INJECTION, SOLUTION INTRAVENOUS EVERY 12 HOURS
Refills: 0 | Status: DISCONTINUED | OUTPATIENT
Start: 2024-07-08 | End: 2024-07-09

## 2024-07-08 RX ORDER — GLUCAGON HYDROCHLORIDE 1 MG/ML
1 INJECTION, POWDER, FOR SOLUTION INTRAMUSCULAR; INTRAVENOUS; SUBCUTANEOUS ONCE
Refills: 0 | Status: DISCONTINUED | OUTPATIENT
Start: 2024-07-08 | End: 2024-07-09

## 2024-07-08 RX ORDER — PANTOPRAZOLE SODIUM 40 MG/10ML
40 INJECTION, POWDER, FOR SOLUTION INTRAVENOUS
Refills: 0 | Status: DISCONTINUED | OUTPATIENT
Start: 2024-07-08 | End: 2024-07-09

## 2024-07-08 RX ORDER — DEXTROSE 30 % IN WATER 30 %
15 VIAL (ML) INTRAVENOUS ONCE
Refills: 0 | Status: DISCONTINUED | OUTPATIENT
Start: 2024-07-08 | End: 2024-07-09

## 2024-07-08 RX ORDER — MORPHINE SULFATE 100 MG/1
1 TABLET, EXTENDED RELEASE ORAL ONCE
Refills: 0 | Status: DISCONTINUED | OUTPATIENT
Start: 2024-07-08 | End: 2024-07-08

## 2024-07-08 RX ORDER — DEXTROSE MONOHYDRATE 100 MG/ML
125 INJECTION, SOLUTION INTRAVENOUS ONCE
Refills: 0 | Status: DISCONTINUED | OUTPATIENT
Start: 2024-07-08 | End: 2024-07-09

## 2024-07-08 RX ORDER — DEXTROSE MONOHYDRATE AND SODIUM CHLORIDE 5; .3 G/100ML; G/100ML
1000 INJECTION, SOLUTION INTRAVENOUS
Refills: 0 | Status: DISCONTINUED | OUTPATIENT
Start: 2024-07-08 | End: 2024-07-09

## 2024-07-08 RX ORDER — HEPARIN SODIUM 50 [USP'U]/ML
5000 INJECTION, SOLUTION INTRAVENOUS ONCE
Refills: 0 | Status: DISCONTINUED | OUTPATIENT
Start: 2024-07-08 | End: 2024-07-08

## 2024-07-08 RX ORDER — INSULIN LISPRO 100 [IU]/ML
INJECTION, SOLUTION SUBCUTANEOUS AT BEDTIME
Refills: 0 | Status: DISCONTINUED | OUTPATIENT
Start: 2024-07-08 | End: 2024-07-09

## 2024-07-08 RX ORDER — FAMOTIDINE 40 MG
20 TABLET ORAL ONCE
Refills: 0 | Status: DISCONTINUED | OUTPATIENT
Start: 2024-07-08 | End: 2024-07-09

## 2024-07-08 RX ORDER — DEXTROSE 30 % IN WATER 30 %
12.5 VIAL (ML) INTRAVENOUS ONCE
Refills: 0 | Status: DISCONTINUED | OUTPATIENT
Start: 2024-07-08 | End: 2024-07-09

## 2024-07-08 RX ORDER — ACETAMINOPHEN 325 MG
650 TABLET ORAL ONCE
Refills: 0 | Status: COMPLETED | OUTPATIENT
Start: 2024-07-08 | End: 2024-07-08

## 2024-07-08 RX ORDER — DEXTROSE 30 % IN WATER 30 %
25 VIAL (ML) INTRAVENOUS ONCE
Refills: 0 | Status: DISCONTINUED | OUTPATIENT
Start: 2024-07-08 | End: 2024-07-09

## 2024-07-08 RX ORDER — INSULIN LISPRO 100 [IU]/ML
INJECTION, SOLUTION SUBCUTANEOUS
Refills: 0 | Status: DISCONTINUED | OUTPATIENT
Start: 2024-07-08 | End: 2024-07-09

## 2024-07-08 RX ADMIN — CHLORDIAZEPOXIDE HYDROCHLORIDE 50 MILLIGRAM(S): 10 CAPSULE ORAL at 00:55

## 2024-07-08 RX ADMIN — Medication 100 MILLIGRAM(S): at 17:46

## 2024-07-08 RX ADMIN — CHLORDIAZEPOXIDE HYDROCHLORIDE 50 MILLIGRAM(S): 10 CAPSULE ORAL at 11:47

## 2024-07-08 RX ADMIN — CHLORDIAZEPOXIDE HYDROCHLORIDE 50 MILLIGRAM(S): 10 CAPSULE ORAL at 17:43

## 2024-07-08 RX ADMIN — Medication 650 MILLIGRAM(S): at 09:44

## 2024-07-08 RX ADMIN — MORPHINE SULFATE 1 MILLIGRAM(S): 100 TABLET, EXTENDED RELEASE ORAL at 21:40

## 2024-07-08 RX ADMIN — PANTOPRAZOLE SODIUM 40 MILLIGRAM(S): 40 INJECTION, POWDER, FOR SOLUTION INTRAVENOUS at 01:12

## 2024-07-08 RX ADMIN — PANTOPRAZOLE SODIUM 40 MILLIGRAM(S): 40 INJECTION, POWDER, FOR SOLUTION INTRAVENOUS at 17:44

## 2024-07-08 RX ADMIN — Medication 1 GRAM(S): at 17:44

## 2024-07-08 RX ADMIN — HEPARIN SODIUM 5000 UNIT(S): 50 INJECTION, SOLUTION INTRAVENOUS at 05:25

## 2024-07-08 RX ADMIN — Medication 1 TABLET(S): at 11:49

## 2024-07-08 RX ADMIN — Medication 100 MILLIGRAM(S): at 11:49

## 2024-07-08 RX ADMIN — HEPARIN SODIUM 5000 UNIT(S): 50 INJECTION, SOLUTION INTRAVENOUS at 17:43

## 2024-07-08 RX ADMIN — DEXTROSE MONOHYDRATE AND SODIUM CHLORIDE 125 MILLILITER(S): 5; .3 INJECTION, SOLUTION INTRAVENOUS at 00:55

## 2024-07-08 RX ADMIN — DEXTROSE MONOHYDRATE AND SODIUM CHLORIDE 125 MILLILITER(S): 5; .3 INJECTION, SOLUTION INTRAVENOUS at 09:06

## 2024-07-08 RX ADMIN — Medication 1 GRAM(S): at 05:03

## 2024-07-08 RX ADMIN — Medication 1 MILLIGRAM(S): at 11:48

## 2024-07-08 RX ADMIN — Medication 1 GRAM(S): at 00:55

## 2024-07-08 RX ADMIN — MORPHINE SULFATE 1 MILLIGRAM(S): 100 TABLET, EXTENDED RELEASE ORAL at 20:52

## 2024-07-08 RX ADMIN — Medication 1 GRAM(S): at 23:23

## 2024-07-08 RX ADMIN — PANTOPRAZOLE SODIUM 40 MILLIGRAM(S): 40 INJECTION, POWDER, FOR SOLUTION INTRAVENOUS at 05:09

## 2024-07-08 RX ADMIN — Medication 1000 MICROGRAM(S): at 13:40

## 2024-07-08 RX ADMIN — CHLORDIAZEPOXIDE HYDROCHLORIDE 50 MILLIGRAM(S): 10 CAPSULE ORAL at 05:03

## 2024-07-08 RX ADMIN — ONDANSETRON HYDROCHLORIDE 4 MILLIGRAM(S): 2 INJECTION INTRAMUSCULAR; INTRAVENOUS at 02:30

## 2024-07-08 RX ADMIN — Medication 1 GRAM(S): at 11:48

## 2024-07-09 ENCOUNTER — TRANSCRIPTION ENCOUNTER (OUTPATIENT)
Age: 59
End: 2024-07-09

## 2024-07-09 VITALS
DIASTOLIC BLOOD PRESSURE: 84 MMHG | RESPIRATION RATE: 18 BRPM | OXYGEN SATURATION: 96 % | TEMPERATURE: 98 F | HEART RATE: 93 BPM | SYSTOLIC BLOOD PRESSURE: 129 MMHG

## 2024-07-09 LAB
A1C WITH ESTIMATED AVERAGE GLUCOSE RESULT: 5.4 % — SIGNIFICANT CHANGE UP (ref 4–5.6)
ANION GAP SERPL CALC-SCNC: 7 MMOL/L — SIGNIFICANT CHANGE UP (ref 5–17)
BUN SERPL-MCNC: 8 MG/DL — SIGNIFICANT CHANGE UP (ref 7–23)
CALCIUM SERPL-MCNC: 8.9 MG/DL — SIGNIFICANT CHANGE UP (ref 8.5–10.1)
CHLORIDE SERPL-SCNC: 104 MMOL/L — SIGNIFICANT CHANGE UP (ref 96–108)
CO2 SERPL-SCNC: 25 MMOL/L — SIGNIFICANT CHANGE UP (ref 22–31)
CREAT SERPL-MCNC: 0.88 MG/DL — SIGNIFICANT CHANGE UP (ref 0.5–1.3)
EGFR: 100 ML/MIN/1.73M2 — SIGNIFICANT CHANGE UP
ESTIMATED AVERAGE GLUCOSE: 108 MG/DL — SIGNIFICANT CHANGE UP (ref 68–114)
GLUCOSE BLDC GLUCOMTR-MCNC: 171 MG/DL — HIGH (ref 70–99)
GLUCOSE BLDC GLUCOMTR-MCNC: 93 MG/DL — SIGNIFICANT CHANGE UP (ref 70–99)
GLUCOSE SERPL-MCNC: 80 MG/DL — SIGNIFICANT CHANGE UP (ref 70–99)
HCT VFR BLD CALC: 34.4 % — LOW (ref 39–50)
HGB BLD-MCNC: 10.9 G/DL — LOW (ref 13–17)
LIDOCAIN IGE QN: 77 U/L — HIGH (ref 13–75)
MAGNESIUM SERPL-MCNC: 1.5 MG/DL — LOW (ref 1.6–2.6)
MCHC RBC-ENTMCNC: 24 PG — LOW (ref 27–34)
MCHC RBC-ENTMCNC: 31.7 G/DL — LOW (ref 32–36)
MCV RBC AUTO: 75.8 FL — LOW (ref 80–100)
NRBC # BLD: 0 /100 WBCS — SIGNIFICANT CHANGE UP (ref 0–0)
PHOSPHATE SERPL-MCNC: 2.4 MG/DL — LOW (ref 2.5–4.5)
PLATELET # BLD AUTO: 110 K/UL — LOW (ref 150–400)
POTASSIUM SERPL-MCNC: 3.6 MMOL/L — SIGNIFICANT CHANGE UP (ref 3.5–5.3)
POTASSIUM SERPL-SCNC: 3.6 MMOL/L — SIGNIFICANT CHANGE UP (ref 3.5–5.3)
RBC # BLD: 4.54 M/UL — SIGNIFICANT CHANGE UP (ref 4.2–5.8)
RBC # FLD: 22.9 % — HIGH (ref 10.3–14.5)
SODIUM SERPL-SCNC: 136 MMOL/L — SIGNIFICANT CHANGE UP (ref 135–145)
TROPONIN I, HIGH SENSITIVITY RESULT: 9.4 NG/L — SIGNIFICANT CHANGE UP
WBC # BLD: 2.49 K/UL — LOW (ref 3.8–10.5)
WBC # FLD AUTO: 2.49 K/UL — LOW (ref 3.8–10.5)

## 2024-07-09 PROCEDURE — 99239 HOSP IP/OBS DSCHRG MGMT >30: CPT

## 2024-07-09 RX ORDER — MAGNESIUM OXIDE 400 MG/1
1 TABLET ORAL
Qty: 6 | Refills: 0
Start: 2024-07-09 | End: 2024-07-11

## 2024-07-09 RX ORDER — MAGNESIUM OXIDE 400 MG/1
400 TABLET ORAL
Refills: 0 | Status: DISCONTINUED | OUTPATIENT
Start: 2024-07-09 | End: 2024-07-09

## 2024-07-09 RX ORDER — MAGNESIUM, ALUMINUM HYDROXIDE 400-400
15 TABLET,CHEWABLE ORAL
Qty: 400 | Refills: 0
Start: 2024-07-09

## 2024-07-09 RX ORDER — PANTOPRAZOLE SODIUM 40 MG/10ML
1 INJECTION, POWDER, FOR SOLUTION INTRAVENOUS
Qty: 15 | Refills: 0
Start: 2024-07-09 | End: 2024-07-23

## 2024-07-09 RX ORDER — SOD PHOS DI, MONO/K PHOS MONO 250 MG
1 TABLET ORAL
Refills: 0 | Status: DISCONTINUED | OUTPATIENT
Start: 2024-07-09 | End: 2024-07-09

## 2024-07-09 RX ORDER — SOD PHOS DI, MONO/K PHOS MONO 250 MG
1 TABLET ORAL
Qty: 6 | Refills: 0
Start: 2024-07-09 | End: 2024-07-11

## 2024-07-09 RX ORDER — MORPHINE SULFATE 100 MG/1
0.5 TABLET, EXTENDED RELEASE ORAL ONCE
Refills: 0 | Status: DISCONTINUED | OUTPATIENT
Start: 2024-07-09 | End: 2024-07-09

## 2024-07-09 RX ADMIN — Medication 2 MILLIGRAM(S): at 00:37

## 2024-07-09 RX ADMIN — PANTOPRAZOLE SODIUM 40 MILLIGRAM(S): 40 INJECTION, POWDER, FOR SOLUTION INTRAVENOUS at 06:53

## 2024-07-09 RX ADMIN — HEPARIN SODIUM 5000 UNIT(S): 50 INJECTION, SOLUTION INTRAVENOUS at 06:53

## 2024-07-09 RX ADMIN — CHLORDIAZEPOXIDE HYDROCHLORIDE 50 MILLIGRAM(S): 10 CAPSULE ORAL at 06:53

## 2024-07-09 RX ADMIN — Medication 1 GRAM(S): at 06:53

## 2024-07-14 LAB
ALPRAZOLAM RESULT, UR: NEGATIVE — SIGNIFICANT CHANGE UP
AMPHET UR-MCNC: NEGATIVE NG/ML — SIGNIFICANT CHANGE UP
BARBITURATES UR QL SCN: NEGATIVE NG/ML — SIGNIFICANT CHANGE UP
BARBITURATES UR-MCNC: NEGATIVE NG/ML — SIGNIFICANT CHANGE UP
BENZODIAZ UR QL SCN: POSITIVE NG/ML
BENZODIAZ UR-MCNC: SIGNIFICANT CHANGE UP NG/ML
BENZODIAZEPINES RESULT, UR: POSITIVE NG/ML
CLONAZEPAM RESULT, UR: NEGATIVE — SIGNIFICANT CHANGE UP
COCAINE METAB.OTHER UR-MCNC: NEGATIVE NG/ML — SIGNIFICANT CHANGE UP
CODEINE RESULT, UR: NEGATIVE — SIGNIFICANT CHANGE UP
CODEINE UR CFM-MCNC: NEGATIVE — SIGNIFICANT CHANGE UP
CREATININE, URINE THERAPEUTIC: 180.7 MG/DL — SIGNIFICANT CHANGE UP (ref 20–300)
FENTANYL RESULT, UR: NEGATIVE NG/ML — SIGNIFICANT CHANGE UP
FENTANYL UR QL SCN: NEGATIVE NG/ML — SIGNIFICANT CHANGE UP
FLURAZEPAM RESULT, UR: NEGATIVE — SIGNIFICANT CHANGE UP
HYDROCODONE RESULT, UR: NEGATIVE — SIGNIFICANT CHANGE UP
HYDROCODONE UR CFM-MCNC: NEGATIVE — SIGNIFICANT CHANGE UP
HYDROMORPHONE RESULT, UR: NEGATIVE — SIGNIFICANT CHANGE UP
HYDROMORPHONE UR CFM-MCNC: NEGATIVE — SIGNIFICANT CHANGE UP
LORAZEPAM RESULT, UR: POSITIVE
LORAZEPAM UR CFM-MCNC: 886 NG/ML — SIGNIFICANT CHANGE UP
LORAZEPAM UR CFM-MCNC: POSITIVE
METHADONE UR QL SCN: NEGATIVE NG/ML — SIGNIFICANT CHANGE UP
MIDAZOLAM RESULT, UR: NEGATIVE — SIGNIFICANT CHANGE UP
MORPHINE RESULT, UR: POSITIVE
MORPHINE SERPL-MCNC: POSITIVE
MORPHINE UR QL CFM: 416 NG/ML — SIGNIFICANT CHANGE UP
NORDIAZEPAM RESULT, UR: NEGATIVE — SIGNIFICANT CHANGE UP
OPIATES RESULT, UR: POSITIVE
OPIATES UR CFM-MCNC: POSITIVE
OPIATES UR-MCNC: SIGNIFICANT CHANGE UP NG/ML
OXAZEPAM RESULT, UR: NEGATIVE — SIGNIFICANT CHANGE UP
OXAZEPAM UR QL SCN: NEGATIVE — SIGNIFICANT CHANGE UP
OXYCODONE UR QL SCN: NEGATIVE NG/ML — SIGNIFICANT CHANGE UP
PCP UR-MCNC: NEGATIVE NG/ML — SIGNIFICANT CHANGE UP
PH, URINE RESULT: 8.8 — SIGNIFICANT CHANGE UP (ref 4.5–8.9)
TEMAZEPAM RESULT, UR: NEGATIVE — SIGNIFICANT CHANGE UP
THC UR QL: NEGATIVE NG/ML — SIGNIFICANT CHANGE UP
TRIAZOLAM RESULT, UR: NEGATIVE — SIGNIFICANT CHANGE UP

## 2024-07-18 NOTE — DISCHARGE NOTE PROVIDER - NSDCQMAMI_CARD_ALL_CORE
Patient Education        DASH Diet: Care Instructions  Your Care Instructions     The DASH diet is an eating plan that can help lower your blood pressure. DASH stands for Dietary Approaches to Stop Hypertension. Hypertension is high blood pressure.  The DASH diet focuses on eating foods that are high in calcium, potassium, and magnesium. These nutrients can lower blood pressure. The foods that are highest in these nutrients are fruits, vegetables, low-fat dairy products, nuts, seeds, and legumes. But taking calcium, potassium, and magnesium supplements instead of eating foods that are high in those nutrients does not have the same effect. The DASH diet also includes whole grains, fish, and poultry.  The DASH diet is one of several lifestyle changes your doctor may recommend to lower your high blood pressure. Your doctor may also want you to decrease the amount of sodium in your diet. Lowering sodium while following the DASH diet can lower blood pressure even further than just the DASH diet alone.  Follow-up care is a key part of your treatment and safety. Be sure to make and go to all appointments, and call your doctor if you are having problems. It's also a good idea to know your test results and keep a list of the medicines you take.  How can you care for yourself at home?  Following the DASH diet  Eat 4 to 5 servings of fruit each day. A serving is 1 medium-sized piece of fruit, 1/2 cup raw or canned fruit, 1/4 cup dried fruit, or 4 ounces (1/2 cup) of fruit juice. Choose fruit more often than fruit juice.  Eat 4 to 5 servings of vegetables each day. A serving is 1 cup of lettuce or raw leafy vegetables, 1/2 cup of chopped or cooked vegetables, or 4 ounces (1/2 cup) of vegetable juice. Choose vegetables more often than vegetable juice.  Get 2 to 3 servings of low-fat and fat-free dairy each day. A serving is 8 ounces of milk, 1 cup of yogurt, or 1½ ounces of cheese.  Eat 6 to 8 servings of grains each day. A 
No

## 2024-08-01 NOTE — ED ADULT NURSE NOTE - PAIN RATING/NUMBER SCALE (0-10): REST
Medication: rosuvastatin passed protocol.   Last office visit date: 02/29/2024  Next appointment scheduled?: Yes   Number of refills given: 5     10

## 2024-08-21 ENCOUNTER — INPATIENT (INPATIENT)
Facility: HOSPITAL | Age: 59
LOS: 2 days | Discharge: LEFT BEFORE DISCHARGE | End: 2024-08-24
Attending: HOSPITALIST | Admitting: HOSPITALIST
Payer: MEDICAID

## 2024-08-21 VITALS
WEIGHT: 175.05 LBS | HEART RATE: 124 BPM | DIASTOLIC BLOOD PRESSURE: 100 MMHG | SYSTOLIC BLOOD PRESSURE: 149 MMHG | TEMPERATURE: 98 F | OXYGEN SATURATION: 100 % | HEIGHT: 70 IN | RESPIRATION RATE: 22 BRPM

## 2024-08-21 LAB
ALBUMIN SERPL ELPH-MCNC: 4.5 G/DL — SIGNIFICANT CHANGE UP (ref 3.3–5)
ALP SERPL-CCNC: 60 U/L — SIGNIFICANT CHANGE UP (ref 40–120)
ALT FLD-CCNC: 48 U/L — SIGNIFICANT CHANGE UP (ref 12–78)
ANION GAP SERPL CALC-SCNC: 18 MMOL/L — HIGH (ref 5–17)
AST SERPL-CCNC: 60 U/L — HIGH (ref 15–37)
BASOPHILS # BLD AUTO: 0.05 K/UL — SIGNIFICANT CHANGE UP (ref 0–0.2)
BASOPHILS NFR BLD AUTO: 1.2 % — SIGNIFICANT CHANGE UP (ref 0–2)
BILIRUB SERPL-MCNC: 0.4 MG/DL — SIGNIFICANT CHANGE UP (ref 0.2–1.2)
BUN SERPL-MCNC: 12 MG/DL — SIGNIFICANT CHANGE UP (ref 7–23)
CALCIUM SERPL-MCNC: 9.7 MG/DL — SIGNIFICANT CHANGE UP (ref 8.5–10.1)
CHLORIDE SERPL-SCNC: 104 MMOL/L — SIGNIFICANT CHANGE UP (ref 96–108)
CO2 SERPL-SCNC: 20 MMOL/L — LOW (ref 22–31)
CREAT SERPL-MCNC: 1.26 MG/DL — SIGNIFICANT CHANGE UP (ref 0.5–1.3)
EGFR: 67 ML/MIN/1.73M2 — SIGNIFICANT CHANGE UP
EGFR: 67 ML/MIN/1.73M2 — SIGNIFICANT CHANGE UP
EOSINOPHIL # BLD AUTO: 0.01 K/UL — SIGNIFICANT CHANGE UP (ref 0–0.5)
EOSINOPHIL NFR BLD AUTO: 0.2 % — SIGNIFICANT CHANGE UP (ref 0–6)
ETHANOL SERPL-MCNC: 370 MG/DL — HIGH (ref 0–10)
GLUCOSE SERPL-MCNC: 90 MG/DL — SIGNIFICANT CHANGE UP (ref 70–99)
HCT VFR BLD CALC: 43.8 % — SIGNIFICANT CHANGE UP (ref 39–50)
HGB BLD-MCNC: 14 G/DL — SIGNIFICANT CHANGE UP (ref 13–17)
IMM GRANULOCYTES NFR BLD AUTO: 0.2 % — SIGNIFICANT CHANGE UP (ref 0–0.9)
LACTATE SERPL-SCNC: 9.3 MMOL/L — CRITICAL HIGH (ref 0.7–2)
LACTATE SERPL-SCNC: 9.7 MMOL/L — CRITICAL HIGH (ref 0.7–2)
LIDOCAIN IGE QN: 64 U/L — SIGNIFICANT CHANGE UP (ref 13–75)
LYMPHOCYTES # BLD AUTO: 2.28 K/UL — SIGNIFICANT CHANGE UP (ref 1–3.3)
LYMPHOCYTES # BLD AUTO: 56.4 % — HIGH (ref 13–44)
MCHC RBC-ENTMCNC: 23.3 PG — LOW (ref 27–34)
MCHC RBC-ENTMCNC: 32 G/DL — SIGNIFICANT CHANGE UP (ref 32–36)
MCV RBC AUTO: 72.9 FL — LOW (ref 80–100)
MONOCYTES # BLD AUTO: 0.2 K/UL — SIGNIFICANT CHANGE UP (ref 0–0.9)
MONOCYTES NFR BLD AUTO: 5 % — SIGNIFICANT CHANGE UP (ref 2–14)
NEUTROPHILS # BLD AUTO: 1.49 K/UL — LOW (ref 1.8–7.4)
NEUTROPHILS NFR BLD AUTO: 37 % — LOW (ref 43–77)
NRBC # BLD: 0 /100 WBCS — SIGNIFICANT CHANGE UP (ref 0–0)
NRBC BLD-RTO: 0 /100 WBCS — SIGNIFICANT CHANGE UP (ref 0–0)
PLATELET # BLD AUTO: 336 K/UL — SIGNIFICANT CHANGE UP (ref 150–400)
POTASSIUM SERPL-MCNC: 4.1 MMOL/L — SIGNIFICANT CHANGE UP (ref 3.5–5.3)
POTASSIUM SERPL-SCNC: 4.1 MMOL/L — SIGNIFICANT CHANGE UP (ref 3.5–5.3)
PROT SERPL-MCNC: 9.5 GM/DL — HIGH (ref 6–8.3)
RBC # BLD: 6.01 M/UL — HIGH (ref 4.2–5.8)
RBC # FLD: 22.3 % — HIGH (ref 10.3–14.5)
SODIUM SERPL-SCNC: 142 MMOL/L — SIGNIFICANT CHANGE UP (ref 135–145)
WBC # BLD: 4.04 K/UL — SIGNIFICANT CHANGE UP (ref 3.8–10.5)
WBC # FLD AUTO: 4.04 K/UL — SIGNIFICANT CHANGE UP (ref 3.8–10.5)

## 2024-08-21 PROCEDURE — 36410 VNPNXR 3YR/> PHY/QHP DX/THER: CPT

## 2024-08-21 PROCEDURE — 74176 CT ABD & PELVIS W/O CONTRAST: CPT | Mod: 26,MC

## 2024-08-21 PROCEDURE — 36000 PLACE NEEDLE IN VEIN: CPT

## 2024-08-21 PROCEDURE — 93010 ELECTROCARDIOGRAM REPORT: CPT

## 2024-08-21 PROCEDURE — 99285 EMERGENCY DEPT VISIT HI MDM: CPT | Mod: 25

## 2024-08-21 RX ORDER — LORAZEPAM 4 MG/ML
1 VIAL (ML) INJECTION ONCE
Refills: 0 | Status: DISCONTINUED | OUTPATIENT
Start: 2024-08-21 | End: 2024-08-21

## 2024-08-21 RX ORDER — ONDANSETRON HCL/PF 4 MG/2 ML
4 VIAL (ML) INJECTION ONCE
Refills: 0 | Status: COMPLETED | OUTPATIENT
Start: 2024-08-21 | End: 2024-08-21

## 2024-08-21 RX ORDER — LORAZEPAM 4 MG/ML
2 VIAL (ML) INJECTION
Refills: 0 | Status: DISCONTINUED | OUTPATIENT
Start: 2024-08-21 | End: 2024-08-22

## 2024-08-21 RX ORDER — HALOPERIDOL 10 MG/1
5 TABLET ORAL ONCE
Refills: 0 | Status: COMPLETED | OUTPATIENT
Start: 2024-08-21 | End: 2024-08-21

## 2024-08-21 RX ADMIN — Medication 2 MILLIGRAM(S): at 21:39

## 2024-08-21 RX ADMIN — HALOPERIDOL 5 MILLIGRAM(S): 10 TABLET ORAL at 22:12

## 2024-08-21 RX ADMIN — Medication 1 MILLIGRAM(S): at 20:00

## 2024-08-21 RX ADMIN — Medication 4 MILLIGRAM(S): at 20:05

## 2024-08-21 RX ADMIN — Medication 1000 MILLILITER(S): at 23:04

## 2024-08-21 RX ADMIN — Medication 2 MILLIGRAM(S): at 22:09

## 2024-08-21 RX ADMIN — Medication 1000 MILLILITER(S): at 19:44

## 2024-08-21 RX ADMIN — Medication 1 MILLIGRAM(S): at 22:12

## 2024-08-22 DIAGNOSIS — Z29.9 ENCOUNTER FOR PROPHYLACTIC MEASURES, UNSPECIFIED: ICD-10-CM

## 2024-08-22 DIAGNOSIS — F10.20 ALCOHOL DEPENDENCE, UNCOMPLICATED: ICD-10-CM

## 2024-08-22 DIAGNOSIS — R10.9 UNSPECIFIED ABDOMINAL PAIN: ICD-10-CM

## 2024-08-22 DIAGNOSIS — R79.89 OTHER SPECIFIED ABNORMAL FINDINGS OF BLOOD CHEMISTRY: ICD-10-CM

## 2024-08-22 LAB
ALBUMIN SERPL ELPH-MCNC: 3.6 G/DL — SIGNIFICANT CHANGE UP (ref 3.3–5)
ALP SERPL-CCNC: 44 U/L — SIGNIFICANT CHANGE UP (ref 40–120)
ALT FLD-CCNC: 38 U/L — SIGNIFICANT CHANGE UP (ref 12–78)
ANION GAP SERPL CALC-SCNC: 11 MMOL/L — SIGNIFICANT CHANGE UP (ref 5–17)
AST SERPL-CCNC: 54 U/L — HIGH (ref 15–37)
BILIRUB DIRECT SERPL-MCNC: 0.2 MG/DL — SIGNIFICANT CHANGE UP (ref 0–0.3)
BILIRUB INDIRECT FLD-MCNC: 0.7 MG/DL — SIGNIFICANT CHANGE UP (ref 0.2–1)
BILIRUB SERPL-MCNC: 0.9 MG/DL — SIGNIFICANT CHANGE UP (ref 0.2–1.2)
BUN SERPL-MCNC: 9 MG/DL — SIGNIFICANT CHANGE UP (ref 7–23)
CALCIUM SERPL-MCNC: 8.6 MG/DL — SIGNIFICANT CHANGE UP (ref 8.5–10.1)
CHLORIDE SERPL-SCNC: 106 MMOL/L — SIGNIFICANT CHANGE UP (ref 96–108)
CO2 SERPL-SCNC: 23 MMOL/L — SIGNIFICANT CHANGE UP (ref 22–31)
CREAT SERPL-MCNC: 0.88 MG/DL — SIGNIFICANT CHANGE UP (ref 0.5–1.3)
EGFR: 100 ML/MIN/1.73M2 — SIGNIFICANT CHANGE UP
EGFR: 100 ML/MIN/1.73M2 — SIGNIFICANT CHANGE UP
GLUCOSE SERPL-MCNC: 83 MG/DL — SIGNIFICANT CHANGE UP (ref 70–99)
LACTATE SERPL-SCNC: 6.4 MMOL/L — CRITICAL HIGH (ref 0.7–2)
LACTATE SERPL-SCNC: 6.4 MMOL/L — CRITICAL HIGH (ref 0.7–2)
MAGNESIUM SERPL-MCNC: 1.5 MG/DL — LOW (ref 1.6–2.6)
PHOSPHATE SERPL-MCNC: 1.6 MG/DL — LOW (ref 2.5–4.5)
POTASSIUM SERPL-MCNC: 3.9 MMOL/L — SIGNIFICANT CHANGE UP (ref 3.5–5.3)
POTASSIUM SERPL-SCNC: 3.9 MMOL/L — SIGNIFICANT CHANGE UP (ref 3.5–5.3)
PROT SERPL-MCNC: 7.1 GM/DL — SIGNIFICANT CHANGE UP (ref 6–8.3)
SODIUM SERPL-SCNC: 140 MMOL/L — SIGNIFICANT CHANGE UP (ref 135–145)

## 2024-08-22 PROCEDURE — 99223 1ST HOSP IP/OBS HIGH 75: CPT

## 2024-08-22 PROCEDURE — 99232 SBSQ HOSP IP/OBS MODERATE 35: CPT

## 2024-08-22 PROCEDURE — 76705 ECHO EXAM OF ABDOMEN: CPT | Mod: 26

## 2024-08-22 RX ORDER — ENOXAPARIN SODIUM 100 MG/ML
40 INJECTION SUBCUTANEOUS EVERY 24 HOURS
Refills: 0 | Status: DISCONTINUED | OUTPATIENT
Start: 2024-08-22 | End: 2024-08-24

## 2024-08-22 RX ORDER — POTASSIUM PHOSPHATE, MONOBASIC POTASSIUM PHOSPHATE, DIBASIC INJECTION, 236; 224 MG/ML; MG/ML
30 SOLUTION, CONCENTRATE INTRAVENOUS ONCE
Refills: 0 | Status: COMPLETED | OUTPATIENT
Start: 2024-08-22 | End: 2024-08-22

## 2024-08-22 RX ORDER — ACETAMINOPHEN 500 MG/5ML
1000 LIQUID (ML) ORAL ONCE
Refills: 0 | Status: COMPLETED | OUTPATIENT
Start: 2024-08-22 | End: 2024-08-22

## 2024-08-22 RX ORDER — ACETAMINOPHEN 500 MG/5ML
975 LIQUID (ML) ORAL ONCE
Refills: 0 | Status: COMPLETED | OUTPATIENT
Start: 2024-08-22 | End: 2024-08-22

## 2024-08-22 RX ORDER — MAGNESIUM SULFATE 500 MG/ML
2 SYRINGE (ML) INJECTION ONCE
Refills: 0 | Status: COMPLETED | OUTPATIENT
Start: 2024-08-22 | End: 2024-08-22

## 2024-08-22 RX ORDER — METOCLOPRAMIDE HCL 10 MG
10 TABLET ORAL ONCE
Refills: 0 | Status: COMPLETED | OUTPATIENT
Start: 2024-08-22 | End: 2024-08-22

## 2024-08-22 RX ORDER — SODIUM CHLORIDE 9 G/1000ML
2000 INJECTION, SOLUTION INTRAVENOUS ONCE
Refills: 0 | Status: COMPLETED | OUTPATIENT
Start: 2024-08-22 | End: 2024-08-22

## 2024-08-22 RX ORDER — MAGNESIUM, ALUMINUM HYDROXIDE 200-200 MG
30 TABLET,CHEWABLE ORAL EVERY 4 HOURS
Refills: 0 | Status: DISCONTINUED | OUTPATIENT
Start: 2024-08-22 | End: 2024-08-24

## 2024-08-22 RX ORDER — MELATONIN 5 MG
3 TABLET ORAL AT BEDTIME
Refills: 0 | Status: DISCONTINUED | OUTPATIENT
Start: 2024-08-22 | End: 2024-08-24

## 2024-08-22 RX ORDER — SUCRALFATE 1 G
1 TABLET ORAL
Refills: 0 | Status: DISCONTINUED | OUTPATIENT
Start: 2024-08-22 | End: 2024-08-24

## 2024-08-22 RX ORDER — ACETAMINOPHEN 500 MG/5ML
650 LIQUID (ML) ORAL EVERY 6 HOURS
Refills: 0 | Status: DISCONTINUED | OUTPATIENT
Start: 2024-08-22 | End: 2024-08-24

## 2024-08-22 RX ORDER — FOLIC ACID 1 MG/1
1 TABLET ORAL DAILY
Refills: 0 | Status: DISCONTINUED | OUTPATIENT
Start: 2024-08-22 | End: 2024-08-24

## 2024-08-22 RX ORDER — PHENOBARBITAL 30 MG/1
129.6 TABLET ORAL EVERY 6 HOURS
Refills: 0 | Status: DISCONTINUED | OUTPATIENT
Start: 2024-08-22 | End: 2024-08-23

## 2024-08-22 RX ORDER — MAGNESIUM, ALUMINUM HYDROXIDE 200-200 MG
15 TABLET,CHEWABLE ORAL EVERY 6 HOURS
Refills: 0 | Status: DISCONTINUED | OUTPATIENT
Start: 2024-08-22 | End: 2024-08-22

## 2024-08-22 RX ORDER — PHENOBARBITAL 30 MG/1
64.8 TABLET ORAL EVERY 6 HOURS
Refills: 0 | Status: DISCONTINUED | OUTPATIENT
Start: 2024-08-23 | End: 2024-08-23

## 2024-08-22 RX ORDER — PHENOBARBITAL 30 MG/1
130 TABLET ORAL EVERY 4 HOURS
Refills: 0 | Status: DISCONTINUED | OUTPATIENT
Start: 2024-08-22 | End: 2024-08-24

## 2024-08-22 RX ORDER — B1/B2/B3/B5/B6/B12/VIT C/FOLIC 500-0.5 MG
1 TABLET ORAL DAILY
Refills: 0 | Status: DISCONTINUED | OUTPATIENT
Start: 2024-08-22 | End: 2024-08-24

## 2024-08-22 RX ORDER — PHENOBARBITAL 30 MG/1
16.2 TABLET ORAL EVERY 6 HOURS
Refills: 0 | Status: DISCONTINUED | OUTPATIENT
Start: 2024-08-22 | End: 2024-08-22

## 2024-08-22 RX ORDER — ONDANSETRON HCL/PF 4 MG/2 ML
4 VIAL (ML) INJECTION EVERY 8 HOURS
Refills: 0 | Status: DISCONTINUED | OUTPATIENT
Start: 2024-08-22 | End: 2024-08-24

## 2024-08-22 RX ORDER — LORAZEPAM 4 MG/ML
2 VIAL (ML) INJECTION
Refills: 0 | Status: DISCONTINUED | OUTPATIENT
Start: 2024-08-22 | End: 2024-08-22

## 2024-08-22 RX ADMIN — Medication 2 MILLIGRAM(S): at 20:28

## 2024-08-22 RX ADMIN — POTASSIUM PHOSPHATE, MONOBASIC POTASSIUM PHOSPHATE, DIBASIC INJECTION, 83.33 MILLIMOLE(S): 236; 224 SOLUTION, CONCENTRATE INTRAVENOUS at 22:37

## 2024-08-22 RX ADMIN — PHENOBARBITAL 129.6 MILLIGRAM(S): 30 TABLET ORAL at 17:17

## 2024-08-22 RX ADMIN — SODIUM CHLORIDE 2000 MILLILITER(S): 9 INJECTION, SOLUTION INTRAVENOUS at 07:23

## 2024-08-22 RX ADMIN — Medication 1 GRAM(S): at 23:27

## 2024-08-22 RX ADMIN — Medication 1 GRAM(S): at 17:17

## 2024-08-22 RX ADMIN — Medication 40 MILLIGRAM(S): at 14:09

## 2024-08-22 RX ADMIN — SODIUM CHLORIDE 2000 MILLILITER(S): 9 INJECTION, SOLUTION INTRAVENOUS at 00:27

## 2024-08-22 RX ADMIN — PHENOBARBITAL 129.6 MILLIGRAM(S): 30 TABLET ORAL at 23:28

## 2024-08-22 RX ADMIN — Medication 100 MILLIGRAM(S): at 14:26

## 2024-08-22 RX ADMIN — Medication 1000 MILLIGRAM(S): at 11:22

## 2024-08-22 RX ADMIN — Medication 400 MILLIGRAM(S): at 10:52

## 2024-08-22 RX ADMIN — FOLIC ACID 1 MILLIGRAM(S): 1 TABLET ORAL at 14:09

## 2024-08-22 RX ADMIN — Medication 2 MILLIGRAM(S): at 14:55

## 2024-08-22 RX ADMIN — Medication 10 MILLIGRAM(S): at 09:04

## 2024-08-22 RX ADMIN — PHENOBARBITAL 129.6 MILLIGRAM(S): 30 TABLET ORAL at 14:09

## 2024-08-22 RX ADMIN — ENOXAPARIN SODIUM 40 MILLIGRAM(S): 100 INJECTION SUBCUTANEOUS at 14:02

## 2024-08-22 RX ADMIN — Medication 2 MILLIGRAM(S): at 13:55

## 2024-08-22 RX ADMIN — Medication 2 MILLIGRAM(S): at 09:04

## 2024-08-22 RX ADMIN — Medication 2 MILLIGRAM(S): at 20:13

## 2024-08-22 RX ADMIN — Medication 100 MILLILITER(S): at 14:25

## 2024-08-22 RX ADMIN — Medication 2 MILLIGRAM(S): at 10:04

## 2024-08-22 RX ADMIN — Medication 25 GRAM(S): at 17:50

## 2024-08-22 RX ADMIN — SODIUM CHLORIDE 2000 MILLILITER(S): 9 INJECTION, SOLUTION INTRAVENOUS at 06:23

## 2024-08-22 RX ADMIN — Medication 1 TABLET(S): at 14:02

## 2024-08-23 LAB
ANION GAP SERPL CALC-SCNC: 8 MMOL/L — SIGNIFICANT CHANGE UP (ref 5–17)
BUN SERPL-MCNC: 6 MG/DL — LOW (ref 7–23)
CALCIUM SERPL-MCNC: 7.8 MG/DL — LOW (ref 8.5–10.1)
CHLORIDE SERPL-SCNC: 103 MMOL/L — SIGNIFICANT CHANGE UP (ref 96–108)
CO2 SERPL-SCNC: 23 MMOL/L — SIGNIFICANT CHANGE UP (ref 22–31)
CREAT SERPL-MCNC: 0.76 MG/DL — SIGNIFICANT CHANGE UP (ref 0.5–1.3)
EGFR: 105 ML/MIN/1.73M2 — SIGNIFICANT CHANGE UP
EGFR: 105 ML/MIN/1.73M2 — SIGNIFICANT CHANGE UP
GLUCOSE SERPL-MCNC: 144 MG/DL — HIGH (ref 70–99)
LACTATE SERPL-SCNC: 0.9 MMOL/L — SIGNIFICANT CHANGE UP (ref 0.7–2)
MAGNESIUM SERPL-MCNC: 1.8 MG/DL — SIGNIFICANT CHANGE UP (ref 1.6–2.6)
PHOSPHATE SERPL-MCNC: 1.7 MG/DL — LOW (ref 2.5–4.5)
POTASSIUM SERPL-MCNC: 3.9 MMOL/L — SIGNIFICANT CHANGE UP (ref 3.5–5.3)
POTASSIUM SERPL-SCNC: 3.9 MMOL/L — SIGNIFICANT CHANGE UP (ref 3.5–5.3)
SODIUM SERPL-SCNC: 134 MMOL/L — LOW (ref 135–145)

## 2024-08-23 PROCEDURE — 99232 SBSQ HOSP IP/OBS MODERATE 35: CPT

## 2024-08-23 RX ORDER — SODIUM PHOSPHATE,DIBASIC DIHYD
30 POWDER (GRAM) MISCELLANEOUS ONCE
Refills: 0 | Status: COMPLETED | OUTPATIENT
Start: 2024-08-23 | End: 2024-08-23

## 2024-08-23 RX ORDER — MAGNESIUM SULFATE 500 MG/ML
2 SYRINGE (ML) INJECTION ONCE
Refills: 0 | Status: COMPLETED | OUTPATIENT
Start: 2024-08-23 | End: 2024-08-23

## 2024-08-23 RX ORDER — PHENOBARBITAL 30 MG/1
16.2 TABLET ORAL EVERY 6 HOURS
Refills: 0 | Status: DISCONTINUED | OUTPATIENT
Start: 2024-08-25 | End: 2024-08-24

## 2024-08-23 RX ORDER — PHENOBARBITAL 30 MG/1
64.8 TABLET ORAL EVERY 6 HOURS
Refills: 0 | Status: DISCONTINUED | OUTPATIENT
Start: 2024-08-23 | End: 2024-08-23

## 2024-08-23 RX ORDER — PHENOBARBITAL 30 MG/1
32.4 TABLET ORAL EVERY 6 HOURS
Refills: 0 | Status: DISCONTINUED | OUTPATIENT
Start: 2024-08-24 | End: 2024-08-24

## 2024-08-23 RX ADMIN — PHENOBARBITAL 64.8 MILLIGRAM(S): 30 TABLET ORAL at 17:07

## 2024-08-23 RX ADMIN — Medication 85 MILLIMOLE(S): at 18:12

## 2024-08-23 RX ADMIN — Medication 2 MILLIGRAM(S): at 22:14

## 2024-08-23 RX ADMIN — Medication 2 MILLIGRAM(S): at 16:12

## 2024-08-23 RX ADMIN — Medication 2 MILLIGRAM(S): at 11:01

## 2024-08-23 RX ADMIN — Medication 40 MILLIGRAM(S): at 17:07

## 2024-08-23 RX ADMIN — Medication 2 MILLIGRAM(S): at 16:32

## 2024-08-23 RX ADMIN — Medication 25 GRAM(S): at 16:22

## 2024-08-23 RX ADMIN — Medication 1 GRAM(S): at 05:57

## 2024-08-23 RX ADMIN — Medication 1 TABLET(S): at 11:06

## 2024-08-23 RX ADMIN — Medication 2 MILLIGRAM(S): at 02:43

## 2024-08-23 RX ADMIN — Medication 40 MILLIGRAM(S): at 05:57

## 2024-08-23 RX ADMIN — ENOXAPARIN SODIUM 40 MILLIGRAM(S): 100 INJECTION SUBCUTANEOUS at 13:09

## 2024-08-23 RX ADMIN — PHENOBARBITAL 64.8 MILLIGRAM(S): 30 TABLET ORAL at 23:50

## 2024-08-23 RX ADMIN — PHENOBARBITAL 64.8 MILLIGRAM(S): 30 TABLET ORAL at 11:06

## 2024-08-23 RX ADMIN — FOLIC ACID 1 MILLIGRAM(S): 1 TABLET ORAL at 11:06

## 2024-08-23 RX ADMIN — Medication 2 MILLIGRAM(S): at 23:14

## 2024-08-23 RX ADMIN — Medication 2 MILLIGRAM(S): at 10:01

## 2024-08-23 RX ADMIN — Medication 100 MILLILITER(S): at 06:07

## 2024-08-23 RX ADMIN — Medication 1 GRAM(S): at 11:06

## 2024-08-23 RX ADMIN — Medication 1 GRAM(S): at 23:51

## 2024-08-23 RX ADMIN — Medication 1 GRAM(S): at 17:07

## 2024-08-23 RX ADMIN — Medication 2 MILLIGRAM(S): at 02:28

## 2024-08-23 RX ADMIN — PHENOBARBITAL 64.8 MILLIGRAM(S): 30 TABLET ORAL at 05:57

## 2024-08-24 VITALS
HEART RATE: 61 BPM | DIASTOLIC BLOOD PRESSURE: 78 MMHG | RESPIRATION RATE: 17 BRPM | TEMPERATURE: 98 F | SYSTOLIC BLOOD PRESSURE: 134 MMHG | OXYGEN SATURATION: 99 %

## 2024-08-24 LAB
ANION GAP SERPL CALC-SCNC: 9 MMOL/L — SIGNIFICANT CHANGE UP (ref 5–17)
BUN SERPL-MCNC: 3 MG/DL — LOW (ref 7–23)
CALCIUM SERPL-MCNC: 8.5 MG/DL — SIGNIFICANT CHANGE UP (ref 8.5–10.1)
CHLORIDE SERPL-SCNC: 104 MMOL/L — SIGNIFICANT CHANGE UP (ref 96–108)
CO2 SERPL-SCNC: 23 MMOL/L — SIGNIFICANT CHANGE UP (ref 22–31)
CREAT SERPL-MCNC: 0.91 MG/DL — SIGNIFICANT CHANGE UP (ref 0.5–1.3)
EGFR: 98 ML/MIN/1.73M2 — SIGNIFICANT CHANGE UP
EGFR: 98 ML/MIN/1.73M2 — SIGNIFICANT CHANGE UP
GLUCOSE SERPL-MCNC: 118 MG/DL — HIGH (ref 70–99)
MAGNESIUM SERPL-MCNC: 2 MG/DL — SIGNIFICANT CHANGE UP (ref 1.6–2.6)
PHOSPHATE SERPL-MCNC: 2.1 MG/DL — LOW (ref 2.5–4.5)
POTASSIUM SERPL-MCNC: 3.3 MMOL/L — LOW (ref 3.5–5.3)
POTASSIUM SERPL-SCNC: 3.3 MMOL/L — LOW (ref 3.5–5.3)
SODIUM SERPL-SCNC: 136 MMOL/L — SIGNIFICANT CHANGE UP (ref 135–145)

## 2024-08-24 PROCEDURE — 99232 SBSQ HOSP IP/OBS MODERATE 35: CPT

## 2024-08-24 RX ORDER — POTASSIUM PHOSPHATE, MONOBASIC POTASSIUM PHOSPHATE, DIBASIC INJECTION, 236; 224 MG/ML; MG/ML
15 SOLUTION, CONCENTRATE INTRAVENOUS ONCE
Refills: 0 | Status: COMPLETED | OUTPATIENT
Start: 2024-08-24 | End: 2024-08-24

## 2024-08-24 RX ORDER — SOD PHOS DI, MONO/K PHOS MONO 250 MG
2 TABLET ORAL ONCE
Refills: 0 | Status: DISCONTINUED | OUTPATIENT
Start: 2024-08-24 | End: 2024-08-24

## 2024-08-24 RX ADMIN — Medication 1 GRAM(S): at 05:10

## 2024-08-24 RX ADMIN — Medication 3 MILLIGRAM(S): at 01:46

## 2024-08-24 RX ADMIN — POTASSIUM PHOSPHATE, MONOBASIC POTASSIUM PHOSPHATE, DIBASIC INJECTION, 62.5 MILLIMOLE(S): 236; 224 SOLUTION, CONCENTRATE INTRAVENOUS at 11:00

## 2024-08-24 RX ADMIN — Medication 1 GRAM(S): at 14:12

## 2024-08-24 RX ADMIN — Medication 40 MILLIEQUIVALENT(S): at 14:11

## 2024-08-24 RX ADMIN — PHENOBARBITAL 32.4 MILLIGRAM(S): 30 TABLET ORAL at 14:10

## 2024-08-24 RX ADMIN — Medication 650 MILLIGRAM(S): at 02:48

## 2024-08-24 RX ADMIN — FOLIC ACID 1 MILLIGRAM(S): 1 TABLET ORAL at 14:12

## 2024-08-24 RX ADMIN — Medication 650 MILLIGRAM(S): at 03:48

## 2024-08-24 RX ADMIN — Medication 40 MILLIGRAM(S): at 05:09

## 2024-08-24 RX ADMIN — Medication 1 TABLET(S): at 14:11

## 2024-08-24 RX ADMIN — PHENOBARBITAL 32.4 MILLIGRAM(S): 30 TABLET ORAL at 05:10

## 2024-08-30 DIAGNOSIS — E87.20 ACIDOSIS, UNSPECIFIED: ICD-10-CM

## 2024-08-30 DIAGNOSIS — K76.0 FATTY (CHANGE OF) LIVER, NOT ELSEWHERE CLASSIFIED: ICD-10-CM

## 2024-08-30 DIAGNOSIS — Z53.29 PROCEDURE AND TREATMENT NOT CARRIED OUT BECAUSE OF PATIENT'S DECISION FOR OTHER REASONS: ICD-10-CM

## 2024-08-30 DIAGNOSIS — F10.239 ALCOHOL DEPENDENCE WITH WITHDRAWAL, UNSPECIFIED: ICD-10-CM

## 2024-08-30 DIAGNOSIS — F10.229 ALCOHOL DEPENDENCE WITH INTOXICATION, UNSPECIFIED: ICD-10-CM

## 2024-08-30 DIAGNOSIS — R10.13 EPIGASTRIC PAIN: ICD-10-CM

## 2024-08-30 DIAGNOSIS — K29.70 GASTRITIS, UNSPECIFIED, WITHOUT BLEEDING: ICD-10-CM
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
## 2024-08-30 DIAGNOSIS — K52.9 NONINFECTIVE GASTROENTERITIS AND COLITIS, UNSPECIFIED: ICD-10-CM

## 2024-08-30 DIAGNOSIS — E83.42 HYPOMAGNESEMIA: ICD-10-CM

## 2024-09-10 ENCOUNTER — EMERGENCY (EMERGENCY)
Facility: HOSPITAL | Age: 59
LOS: 0 days | Discharge: HOME | End: 2024-09-11
Attending: EMERGENCY MEDICINE
Payer: MEDICAID

## 2024-09-10 VITALS
OXYGEN SATURATION: 96 % | SYSTOLIC BLOOD PRESSURE: 126 MMHG | HEART RATE: 79 BPM | RESPIRATION RATE: 18 BRPM | HEIGHT: 70 IN | DIASTOLIC BLOOD PRESSURE: 89 MMHG | TEMPERATURE: 99 F | WEIGHT: 160.06 LBS

## 2024-09-10 DIAGNOSIS — M25.562 PAIN IN LEFT KNEE: ICD-10-CM

## 2024-09-10 DIAGNOSIS — K29.20 ALCOHOLIC GASTRITIS WITHOUT BLEEDING: ICD-10-CM

## 2024-09-10 DIAGNOSIS — F10.10 ALCOHOL ABUSE, UNCOMPLICATED: ICD-10-CM

## 2024-09-10 DIAGNOSIS — R51.9 HEADACHE, UNSPECIFIED: ICD-10-CM

## 2024-09-10 DIAGNOSIS — G89.29 OTHER CHRONIC PAIN: ICD-10-CM

## 2024-09-10 DIAGNOSIS — R10.13 EPIGASTRIC PAIN: ICD-10-CM

## 2024-09-10 PROCEDURE — 99285 EMERGENCY DEPT VISIT HI MDM: CPT

## 2024-09-10 NOTE — ED ADULT TRIAGE NOTE - CHIEF COMPLAINT QUOTE
Patient BIB [facility] c/o abdominal pain, headache and left knee pain. Bruising to left knee. Patient admits to drinking today.

## 2024-09-11 VITALS
RESPIRATION RATE: 15 BRPM | OXYGEN SATURATION: 99 % | DIASTOLIC BLOOD PRESSURE: 71 MMHG | SYSTOLIC BLOOD PRESSURE: 131 MMHG | HEART RATE: 71 BPM | TEMPERATURE: 97 F

## 2024-09-11 PROCEDURE — 74176 CT ABD & PELVIS W/O CONTRAST: CPT | Mod: 26,MC

## 2024-09-11 RX ORDER — FAMOTIDINE 10 MG/ML
20 INJECTION INTRAVENOUS ONCE
Refills: 0 | Status: DISCONTINUED | OUTPATIENT
Start: 2024-09-11 | End: 2024-09-11

## 2024-09-11 RX ORDER — HALOPERIDOL 1 MG
5 TABLET ORAL ONCE
Refills: 0 | Status: DISCONTINUED | OUTPATIENT
Start: 2024-09-11 | End: 2024-09-11

## 2024-09-11 RX ORDER — SODIUM CHLORIDE 9 MG/ML
1000 INJECTION INTRAMUSCULAR; INTRAVENOUS; SUBCUTANEOUS ONCE
Refills: 0 | Status: DISCONTINUED | OUTPATIENT
Start: 2024-09-11 | End: 2024-09-11

## 2024-09-11 RX ORDER — ONDANSETRON 2 MG/ML
4 INJECTION, SOLUTION INTRAMUSCULAR; INTRAVENOUS ONCE
Refills: 0 | Status: DISCONTINUED | OUTPATIENT
Start: 2024-09-11 | End: 2024-09-11

## 2024-09-11 RX ORDER — IOHEXOL 350 MG/ML
30 INJECTION, SOLUTION INTRAVENOUS ONCE
Refills: 0 | Status: COMPLETED | OUTPATIENT
Start: 2024-09-11 | End: 2024-09-11

## 2024-09-11 RX ORDER — MIDAZOLAM HYDROCHLORIDE 5 MG/ML
2 INJECTION, SOLUTION INTRAMUSCULAR; INTRAVENOUS ONCE
Refills: 0 | Status: DISCONTINUED | OUTPATIENT
Start: 2024-09-11 | End: 2024-09-11

## 2024-09-11 RX ORDER — HALOPERIDOL 1 MG
5 TABLET ORAL ONCE
Refills: 0 | Status: COMPLETED | OUTPATIENT
Start: 2024-09-11 | End: 2024-09-11

## 2024-09-11 RX ORDER — ACETAMINOPHEN 325 MG/1
975 TABLET ORAL ONCE
Refills: 0 | Status: COMPLETED | OUTPATIENT
Start: 2024-09-11 | End: 2024-09-11

## 2024-09-11 RX ADMIN — MIDAZOLAM HYDROCHLORIDE 2 MILLIGRAM(S): 5 INJECTION, SOLUTION INTRAMUSCULAR; INTRAVENOUS at 02:03

## 2024-09-11 RX ADMIN — Medication 5 MILLIGRAM(S): at 02:03

## 2024-09-11 NOTE — ED PROVIDER NOTE - PROGRESS NOTE DETAILS
Results reported to patient--grossly benign, CT negative, pt. refused lab work   Pt. reports feeling better after meds, wants to leave, now Pt. is clinically sober, ambulating with steady gait, demonstrates decision-making capacity.  Pt. has no complaints at this time.  Pt. is stable for D/C home.   pt. agrees to f/u with primary care outpt., pt. educated on alcohol abuse   pt. understands to return to ED if symptoms worsen; will d/c

## 2024-09-11 NOTE — ED PROVIDER NOTE - CARE PROVIDER_API CALL
Miko Pelayo  Internal Medicine  [address] [place] [address]  Phone: [phone]  Fax: [phone]  Follow Up Time: Urgent

## 2024-09-11 NOTE — ED PROVIDER NOTE - PATIENT PORTAL LINK FT
You can access the FollowMyHealth Patient Portal offered by [facility] by registering at the following website: [email or web address]. By joining [employer]’s FollowMyHealth portal, you will also be able to view your health information using other applications (apps) compatible with our system.

## 2024-09-11 NOTE — ED ADULT NURSE NOTE - NSFALLHARMRISKINTERV_ED_ALL_ED
Assistance OOB with selected safe patient handling equipment if applicable/Communicate risk of Fall with Harm to all staff, patient, and family/Provide visual cue: red socks, yellow wristband, yellow gown, etc/Reinforce activity limits and safety measures with patient and family/Use of alarms - bed, stretcher, chair and/or video monitoring/Bed in lowest position, wheels locked, appropriate side rails in place/Call bell, personal items and telephone in reach/Instruct patient to call for assistance before getting out of bed/chair/stretcher/Non-slip footwear applied when patient is off stretcher/[place] to call system/Physically safe environment - no spills, clutter or unnecessary equipment/Purposeful Proactive Rounding/Room/bathroom lighting operational, light cord in reach

## 2024-09-11 NOTE — ED ADULT NURSE NOTE - CHIEF COMPLAINT QUOTE
Patient BIB [facility] c/o abdominal pain, headache and left knee pain. Bruising to left knee. Patient admits to drinking today.

## 2024-09-11 NOTE — ED PROVIDER NOTE - CARE PLAN
Principal Discharge DX:	Alcoholic gastritis  Secondary Diagnosis:	Alcohol abuse  Secondary Diagnosis:	Tension headache  Secondary Diagnosis:	Left knee pain   1

## 2024-09-11 NOTE — ED PROVIDER NOTE - CLINICAL SUMMARY MEDICAL DECISION MAKING FREE TEXT BOX
58 yo M with epigastric pain, concerning for gastritis, pancreatitis, doubt obstruction,  acs less likely, tension headache likely  -cbc, cmp, etoh, lipase, trop, EKG, iv, monitor, npo, pepcid/zofran, tylenol for pain, hydration  -f/u results, reeval

## 2024-09-11 NOTE — ED ADULT NURSE NOTE - OBJECTIVE STATEMENT
Pt is a 60yo Male AAOx2 nkda unknown pmh pw left knee pain and headache 4/10, pt observed with abrasions to left knee and forehead. Pt aggressive with staff. Pt difficult stick, ED provider made aware. Pt medicated as ordered. Pt continuing to be disruptive and non redirectable at this time.

## 2024-09-11 NOTE — ED PROVIDER NOTE - PHYSICAL EXAMINATION
Vitals: WNL  Gen: AAOx3, NAD, sitting comfortably in stretcher  Head: ncat, perrla, eomi b/l  Neck: supple, no lymphadenopathy, no midline deviation  Heart: rrr, no m/r/g  Lungs: CTA b/l, no rales/ronchi/wheezes  Abd: soft, nontender, non-distended, no rebound or guarding  Ext: no clubbing/cyanosis/edema  Neuro: sensation and muscle strength intact b/l, CN2-12 intact b/l, no focal weakness or sensory loss   musculo: normal rom of knees b/l, no deformities or skin changes

## 2024-09-11 NOTE — ED PROVIDER NOTE - OBJECTIVE STATEMENT
58 yo M with chronic epigastric abd pain, consistent with prior episodes, started after drinking whiskey.  Pt. also complaints of mild frontal headache and L knee pain, knee pain is chronic.  No injury.  No other complaints or associated symptoms.  Pt. asks for morphine for abd pain.  No radiation of pain.    ROS: negative for fever, cough, chest pain, shortness of breath, nausea, vomiting, diarrhea, rash, paresthesia, and weakness--all other systems reviewed are negative.   PMH: ETOH use, gastritis, HLD, chronic abd pain, EtOH WD in past; Meds: Denies; SH: Denies smoking/drug use, + chronic EtOH abuse

## 2024-09-11 NOTE — ED ADULT NURSE REASSESSMENT NOTE - NS ED NURSE REASSESS COMMENT FT1
Pt medicated as ordered, Pt still non redirectable; Pt still getting out of bed and is aggressive with staff.
Pt refusing ekg, CT. ED provider made aware.
multiple nurses attempted PIV placement, no success. Pt pending labs at this time. ED provider made aware.
Pt AAOx4, ambulating with steady gait. Pt pending d/c at this time.
Pt difficult stick escalated to ED provider, pt endorses pain but refuses pain medication. Pt requesting injection. Pt aggressive with staff, pushing and screaming at staff. Pt nonredirectable, ED provider made aware.

## 2024-09-13 ENCOUNTER — INPATIENT (INPATIENT)
Facility: HOSPITAL | Age: 59
LOS: 1 days | Discharge: ANOTHER HOSPITAL | End: 2024-09-15
Attending: STUDENT IN AN ORGANIZED HEALTH CARE EDUCATION/TRAINING PROGRAM | Admitting: STUDENT IN AN ORGANIZED HEALTH CARE EDUCATION/TRAINING PROGRAM
Payer: MEDICAID

## 2024-09-13 VITALS
RESPIRATION RATE: 17 BRPM | HEART RATE: 140 BPM | TEMPERATURE: 98 F | DIASTOLIC BLOOD PRESSURE: 104 MMHG | OXYGEN SATURATION: 100 % | HEIGHT: 70 IN | SYSTOLIC BLOOD PRESSURE: 170 MMHG

## 2024-09-13 DIAGNOSIS — K29.20 ALCOHOLIC GASTRITIS WITHOUT BLEEDING: ICD-10-CM

## 2024-09-13 DIAGNOSIS — R79.89 OTHER SPECIFIED ABNORMAL FINDINGS OF BLOOD CHEMISTRY: ICD-10-CM

## 2024-09-13 DIAGNOSIS — Z87.11 PERSONAL HISTORY OF PEPTIC ULCER DISEASE: ICD-10-CM

## 2024-09-13 DIAGNOSIS — E87.8 OTHER DISORDERS OF ELECTROLYTE AND FLUID BALANCE, NOT ELSEWHERE CLASSIFIED: ICD-10-CM

## 2024-09-13 DIAGNOSIS — F10.939 ALCOHOL USE, UNSPECIFIED WITH WITHDRAWAL, UNSPECIFIED: ICD-10-CM

## 2024-09-13 DIAGNOSIS — N17.9 ACUTE KIDNEY FAILURE, UNSPECIFIED: ICD-10-CM

## 2024-09-13 LAB
ALBUMIN SERPL ELPH-MCNC: 3.8 G/DL — SIGNIFICANT CHANGE UP (ref 3.3–5)
ALBUMIN SERPL ELPH-MCNC: 4.4 G/DL — SIGNIFICANT CHANGE UP (ref 3.3–5)
ALP SERPL-CCNC: 73 U/L — SIGNIFICANT CHANGE UP (ref 40–120)
ALP SERPL-CCNC: 76 U/L — SIGNIFICANT CHANGE UP (ref 40–120)
ALT FLD-CCNC: 1688 U/L — HIGH (ref 12–78)
ALT FLD-CCNC: 675 U/L — HIGH (ref 12–78)
AMPHET UR-MCNC: NEGATIVE — SIGNIFICANT CHANGE UP
ANION GAP SERPL CALC-SCNC: 16 MMOL/L — SIGNIFICANT CHANGE UP (ref 5–17)
ANION GAP SERPL CALC-SCNC: 19 MMOL/L — HIGH (ref 5–17)
APPEARANCE UR: CLEAR — SIGNIFICANT CHANGE UP
APTT BLD: 28.9 SEC — SIGNIFICANT CHANGE UP (ref 24.5–35.6)
APTT BLD: 29.3 SEC — SIGNIFICANT CHANGE UP (ref 24.5–35.6)
AST SERPL-CCNC: 2161 U/L — HIGH (ref 15–37)
AST SERPL-CCNC: 5263 U/L — HIGH (ref 15–37)
BARBITURATES UR SCN-MCNC: POSITIVE — SIGNIFICANT CHANGE UP
BASOPHILS # BLD AUTO: 0.01 K/UL — SIGNIFICANT CHANGE UP (ref 0–0.2)
BASOPHILS NFR BLD AUTO: 0.2 % — SIGNIFICANT CHANGE UP (ref 0–2)
BENZODIAZ UR-MCNC: POSITIVE — SIGNIFICANT CHANGE UP
BILIRUB SERPL-MCNC: 2.8 MG/DL — HIGH (ref 0.2–1.2)
BILIRUB SERPL-MCNC: 4.9 MG/DL — HIGH (ref 0.2–1.2)
BILIRUB UR-MCNC: NEGATIVE — SIGNIFICANT CHANGE UP
BUN SERPL-MCNC: 12 MG/DL — SIGNIFICANT CHANGE UP (ref 7–23)
BUN SERPL-MCNC: 13 MG/DL — SIGNIFICANT CHANGE UP (ref 7–23)
CALCIUM SERPL-MCNC: 8.2 MG/DL — LOW (ref 8.5–10.1)
CALCIUM SERPL-MCNC: 9.4 MG/DL — SIGNIFICANT CHANGE UP (ref 8.5–10.1)
CHLORIDE SERPL-SCNC: 106 MMOL/L — SIGNIFICANT CHANGE UP (ref 96–108)
CHLORIDE SERPL-SCNC: 98 MMOL/L — SIGNIFICANT CHANGE UP (ref 96–108)
CMV DNA CSF QL NAA+PROBE: SIGNIFICANT CHANGE UP IU/ML
CMV DNA SPEC NAA+PROBE-LOG#: SIGNIFICANT CHANGE UP LOG10IU/ML
CO2 SERPL-SCNC: 12 MMOL/L — LOW (ref 22–31)
CO2 SERPL-SCNC: 19 MMOL/L — LOW (ref 22–31)
COCAINE METAB.OTHER UR-MCNC: NEGATIVE — SIGNIFICANT CHANGE UP
COLOR SPEC: SIGNIFICANT CHANGE UP
CREAT SERPL-MCNC: 1.43 MG/DL — HIGH (ref 0.5–1.3)
CREAT SERPL-MCNC: 1.57 MG/DL — HIGH (ref 0.5–1.3)
DIFF PNL FLD: NEGATIVE — SIGNIFICANT CHANGE UP
EBV DNA SERPL NAA+PROBE-ACNC: SIGNIFICANT CHANGE UP IU/ML
EBVPCR LOG: SIGNIFICANT CHANGE UP LOG10IU/ML
EGFR: 51 ML/MIN/1.73M2 — LOW
EGFR: 51 ML/MIN/1.73M2 — LOW
EGFR: 57 ML/MIN/1.73M2 — LOW
EGFR: 57 ML/MIN/1.73M2 — LOW
EOSINOPHIL # BLD AUTO: 0 K/UL — SIGNIFICANT CHANGE UP (ref 0–0.5)
EOSINOPHIL NFR BLD AUTO: 0 % — SIGNIFICANT CHANGE UP (ref 0–6)
ETHANOL SERPL-MCNC: <10 MG/DL — SIGNIFICANT CHANGE UP (ref 0–10)
GGT SERPL-CCNC: 206 U/L — HIGH (ref 9–50)
GLUCOSE SERPL-MCNC: 167 MG/DL — HIGH (ref 70–99)
GLUCOSE SERPL-MCNC: 56 MG/DL — LOW (ref 70–99)
GLUCOSE UR QL: NEGATIVE MG/DL — SIGNIFICANT CHANGE UP
HCT VFR BLD CALC: 37.1 % — LOW (ref 39–50)
HGB BLD-MCNC: 12 G/DL — LOW (ref 13–17)
IMM GRANULOCYTES NFR BLD AUTO: 0.5 % — SIGNIFICANT CHANGE UP (ref 0–0.9)
INR BLD: 1.19 RATIO — HIGH (ref 0.85–1.18)
INR BLD: 1.25 RATIO — HIGH (ref 0.85–1.18)
KETONES UR-MCNC: 15 MG/DL
LDH SERPL L TO P-CCNC: 993 U/L — HIGH (ref 50–242)
LEUKOCYTE ESTERASE UR-ACNC: NEGATIVE — SIGNIFICANT CHANGE UP
LIDOCAIN IGE QN: 76 U/L — HIGH (ref 13–75)
LYMPHOCYTES # BLD AUTO: 0.77 K/UL — LOW (ref 1–3.3)
LYMPHOCYTES # BLD AUTO: 18.1 % — SIGNIFICANT CHANGE UP (ref 13–44)
MAGNESIUM SERPL-MCNC: 1.5 MG/DL — LOW (ref 1.6–2.6)
MANUAL SMEAR VERIFICATION: SIGNIFICANT CHANGE UP
MCHC RBC-ENTMCNC: 23.4 PG — LOW (ref 27–34)
MCHC RBC-ENTMCNC: 32.3 G/DL — SIGNIFICANT CHANGE UP (ref 32–36)
MCV RBC AUTO: 72.5 FL — LOW (ref 80–100)
METHADONE UR-MCNC: NEGATIVE — SIGNIFICANT CHANGE UP
MONOCYTES # BLD AUTO: 0.14 K/UL — SIGNIFICANT CHANGE UP (ref 0–0.9)
MONOCYTES NFR BLD AUTO: 3.3 % — SIGNIFICANT CHANGE UP (ref 2–14)
NEUTROPHILS # BLD AUTO: 3.32 K/UL — SIGNIFICANT CHANGE UP (ref 1.8–7.4)
NEUTROPHILS NFR BLD AUTO: 77.9 % — HIGH (ref 43–77)
NITRITE UR-MCNC: NEGATIVE — SIGNIFICANT CHANGE UP
NRBC # BLD: 0 /100 WBCS — SIGNIFICANT CHANGE UP (ref 0–0)
NRBC BLD-RTO: 0 /100 WBCS — SIGNIFICANT CHANGE UP (ref 0–0)
OPIATES UR-MCNC: POSITIVE — SIGNIFICANT CHANGE UP
PCP SPEC-MCNC: SIGNIFICANT CHANGE UP
PCP UR-MCNC: NEGATIVE — SIGNIFICANT CHANGE UP
PH UR: 6 — SIGNIFICANT CHANGE UP (ref 5–8)
PHOSPHATE SERPL-MCNC: 5 MG/DL — HIGH (ref 2.5–4.5)
PLAT MORPH BLD: NORMAL — SIGNIFICANT CHANGE UP
PLATELET # BLD AUTO: 51 K/UL — LOW (ref 150–400)
POTASSIUM SERPL-MCNC: 3.4 MMOL/L — LOW (ref 3.5–5.3)
POTASSIUM SERPL-MCNC: 5.5 MMOL/L — HIGH (ref 3.5–5.3)
POTASSIUM SERPL-SCNC: 3.4 MMOL/L — LOW (ref 3.5–5.3)
POTASSIUM SERPL-SCNC: 5.5 MMOL/L — HIGH (ref 3.5–5.3)
PROT SERPL-MCNC: 7.2 GM/DL — SIGNIFICANT CHANGE UP (ref 6–8.3)
PROT SERPL-MCNC: 8.7 GM/DL — HIGH (ref 6–8.3)
PROT UR-MCNC: 30 MG/DL
PROTHROM AB SERPL-ACNC: 14.2 SEC — HIGH (ref 9.5–13)
PROTHROM AB SERPL-ACNC: 14.9 SEC — HIGH (ref 9.5–13)
RBC # BLD: 5.12 M/UL — SIGNIFICANT CHANGE UP (ref 4.2–5.8)
RBC # FLD: 21 % — HIGH (ref 10.3–14.5)
RBC BLD AUTO: NORMAL — SIGNIFICANT CHANGE UP
SODIUM SERPL-SCNC: 133 MMOL/L — LOW (ref 135–145)
SODIUM SERPL-SCNC: 137 MMOL/L — SIGNIFICANT CHANGE UP (ref 135–145)
SP GR SPEC: >1.03 — HIGH (ref 1–1.03)
THC UR QL: NEGATIVE — SIGNIFICANT CHANGE UP
UROBILINOGEN FLD QL: 1 MG/DL — SIGNIFICANT CHANGE UP (ref 0.2–1)
WBC # BLD: 4.26 K/UL — SIGNIFICANT CHANGE UP (ref 3.8–10.5)
WBC # FLD AUTO: 4.26 K/UL — SIGNIFICANT CHANGE UP (ref 3.8–10.5)

## 2024-09-13 PROCEDURE — 74177 CT ABD & PELVIS W/CONTRAST: CPT | Mod: 26,MC

## 2024-09-13 PROCEDURE — 99285 EMERGENCY DEPT VISIT HI MDM: CPT | Mod: 25

## 2024-09-13 PROCEDURE — 76705 ECHO EXAM OF ABDOMEN: CPT | Mod: 26

## 2024-09-13 PROCEDURE — 99232 SBSQ HOSP IP/OBS MODERATE 35: CPT

## 2024-09-13 PROCEDURE — 99222 1ST HOSP IP/OBS MODERATE 55: CPT

## 2024-09-13 RX ORDER — HYDROMORPHONE/SOD CHLOR,ISO/PF 2 MG/10 ML
0.5 SYRINGE (ML) INJECTION ONCE
Refills: 0 | Status: DISCONTINUED | OUTPATIENT
Start: 2024-09-13 | End: 2024-09-13

## 2024-09-13 RX ORDER — PHENOBARBITAL 30 MG/1
150 TABLET ORAL ONCE
Refills: 0 | Status: DISCONTINUED | OUTPATIENT
Start: 2024-09-13 | End: 2024-09-13

## 2024-09-13 RX ORDER — MAGNESIUM, ALUMINUM HYDROXIDE 200-200 MG
30 TABLET,CHEWABLE ORAL ONCE
Refills: 0 | Status: COMPLETED | OUTPATIENT
Start: 2024-09-13 | End: 2024-09-13

## 2024-09-13 RX ORDER — PHENOBARBITAL 30 MG/1
520 TABLET ORAL ONCE
Refills: 0 | Status: DISCONTINUED | OUTPATIENT
Start: 2024-09-13 | End: 2024-09-13

## 2024-09-13 RX ORDER — SOD PHOS DI, MONO/K PHOS MONO 250 MG
2 TABLET ORAL EVERY 6 HOURS
Refills: 0 | Status: DISCONTINUED | OUTPATIENT
Start: 2024-09-13 | End: 2024-09-13

## 2024-09-13 RX ORDER — POTASSIUM PHOSPHATE, MONOBASIC POTASSIUM PHOSPHATE, DIBASIC INJECTION, 236; 224 MG/ML; MG/ML
30 SOLUTION, CONCENTRATE INTRAVENOUS ONCE
Refills: 0 | Status: COMPLETED | OUTPATIENT
Start: 2024-09-13 | End: 2024-09-13

## 2024-09-13 RX ORDER — DIAZEPAM 5 MG/1
5 TABLET ORAL ONCE
Refills: 0 | Status: DISCONTINUED | OUTPATIENT
Start: 2024-09-13 | End: 2024-09-13

## 2024-09-13 RX ORDER — SUCRALFATE 1 G
1 TABLET ORAL
Refills: 0 | Status: DISCONTINUED | OUTPATIENT
Start: 2024-09-13 | End: 2024-09-14

## 2024-09-13 RX ORDER — PHENOBARBITAL 30 MG/1
390 TABLET ORAL ONCE
Refills: 0 | Status: DISCONTINUED | OUTPATIENT
Start: 2024-09-14 | End: 2024-09-14

## 2024-09-13 RX ORDER — PHENOBARBITAL 30 MG/1
130 TABLET ORAL ONCE
Refills: 0 | Status: DISCONTINUED | OUTPATIENT
Start: 2024-09-13 | End: 2024-09-13

## 2024-09-13 RX ORDER — CHLORDIAZEPOXIDE HCL 10 MG
50 CAPSULE ORAL EVERY 6 HOURS
Refills: 0 | Status: DISCONTINUED | OUTPATIENT
Start: 2024-09-13 | End: 2024-09-13

## 2024-09-13 RX ORDER — B1/B2/B3/B5/B6/B12/VIT C/FOLIC 500-0.5 MG
1 TABLET ORAL DAILY
Refills: 0 | Status: DISCONTINUED | OUTPATIENT
Start: 2024-09-13 | End: 2024-09-14

## 2024-09-13 RX ORDER — ONDANSETRON HCL/PF 4 MG/2 ML
4 VIAL (ML) INJECTION EVERY 6 HOURS
Refills: 0 | Status: DISCONTINUED | OUTPATIENT
Start: 2024-09-13 | End: 2024-09-15

## 2024-09-13 RX ORDER — OXYCODONE HYDROCHLORIDE 30 MG/1
5 TABLET ORAL EVERY 6 HOURS
Refills: 0 | Status: DISCONTINUED | OUTPATIENT
Start: 2024-09-13 | End: 2024-09-14

## 2024-09-13 RX ORDER — INFLUENZA A VIRUS A/IDAHO/07/2018 (H1N1) ANTIGEN (MDCK CELL DERIVED, PROPIOLACTONE INACTIVATED, INFLUENZA A VIRUS A/INDIANA/08/2018 (H3N2) ANTIGEN (MDCK CELL DERIVED, PROPIOLACTONE INACTIVATED), INFLUENZA B VIRUS B/SINGAPORE/INFTT-16-0610/2016 ANTIGEN (MDCK CELL DERIVED, PROPIOLACTONE INACTIVATED), INFLUENZA B VIRUS B/IOWA/06/2017 ANTIGEN (MDCK CELL DERIVED, PROPIOLACTONE INACTIVATED) 15; 15; 15; 15 UG/.5ML; UG/.5ML; UG/.5ML; UG/.5ML
0.5 INJECTION, SUSPENSION INTRAMUSCULAR ONCE
Refills: 0 | Status: DISCONTINUED | OUTPATIENT
Start: 2024-09-13 | End: 2024-09-15

## 2024-09-13 RX ORDER — SODIUM CHLORIDE 9 G/1000ML
1000 INJECTION, SOLUTION INTRAVENOUS
Refills: 0 | Status: DISCONTINUED | OUTPATIENT
Start: 2024-09-13 | End: 2024-09-14

## 2024-09-13 RX ORDER — PHENOBARBITAL 30 MG/1
390 TABLET ORAL ONCE
Refills: 0 | Status: DISCONTINUED | OUTPATIENT
Start: 2024-09-13 | End: 2024-09-13

## 2024-09-13 RX ORDER — LORAZEPAM 4 MG/ML
2 VIAL (ML) INJECTION
Refills: 0 | Status: DISCONTINUED | OUTPATIENT
Start: 2024-09-13 | End: 2024-09-15

## 2024-09-13 RX ORDER — MELATONIN 5 MG
3 TABLET ORAL AT BEDTIME
Refills: 0 | Status: DISCONTINUED | OUTPATIENT
Start: 2024-09-13 | End: 2024-09-14

## 2024-09-13 RX ORDER — FOLIC ACID 1 MG/1
1 TABLET ORAL DAILY
Refills: 0 | Status: DISCONTINUED | OUTPATIENT
Start: 2024-09-13 | End: 2024-09-14

## 2024-09-13 RX ORDER — METOPROLOL SUCCINATE 50 MG/1
5 TABLET, EXTENDED RELEASE ORAL ONCE
Refills: 0 | Status: COMPLETED | OUTPATIENT
Start: 2024-09-13 | End: 2024-09-13

## 2024-09-13 RX ORDER — MAGNESIUM OXIDE 400 MG
400 TABLET ORAL
Refills: 0 | Status: DISCONTINUED | OUTPATIENT
Start: 2024-09-13 | End: 2024-09-14

## 2024-09-13 RX ORDER — ACETAMINOPHEN 500 MG/5ML
1000 LIQUID (ML) ORAL ONCE
Refills: 0 | Status: COMPLETED | OUTPATIENT
Start: 2024-09-13 | End: 2024-09-13

## 2024-09-13 RX ORDER — CHLORDIAZEPOXIDE HCL 10 MG
CAPSULE ORAL
Refills: 0 | Status: DISCONTINUED | OUTPATIENT
Start: 2024-09-13 | End: 2024-09-13

## 2024-09-13 RX ADMIN — Medication 0.5 MILLIGRAM(S): at 18:14

## 2024-09-13 RX ADMIN — Medication 4 MILLIGRAM(S): at 17:49

## 2024-09-13 RX ADMIN — Medication 400 MILLIGRAM(S): at 18:15

## 2024-09-13 RX ADMIN — Medication 1000 MILLIGRAM(S): at 16:26

## 2024-09-13 RX ADMIN — Medication 400 MILLIGRAM(S): at 08:56

## 2024-09-13 RX ADMIN — Medication 2 MILLIGRAM(S): at 22:34

## 2024-09-13 RX ADMIN — PHENOBARBITAL 130 MILLIGRAM(S): 30 TABLET ORAL at 22:53

## 2024-09-13 RX ADMIN — Medication 30 MILLILITER(S): at 06:14

## 2024-09-13 RX ADMIN — Medication 50 MILLIGRAM(S): at 13:34

## 2024-09-13 RX ADMIN — Medication 2000 MILLILITER(S): at 09:25

## 2024-09-13 RX ADMIN — OXYCODONE HYDROCHLORIDE 5 MILLIGRAM(S): 30 TABLET ORAL at 20:22

## 2024-09-13 RX ADMIN — Medication 1000 MILLILITER(S): at 06:51

## 2024-09-13 RX ADMIN — Medication 40 MILLIEQUIVALENT(S): at 08:56

## 2024-09-13 RX ADMIN — DIAZEPAM 5 MILLIGRAM(S): 5 TABLET ORAL at 12:07

## 2024-09-13 RX ADMIN — Medication 0.5 MILLIGRAM(S): at 09:25

## 2024-09-13 RX ADMIN — Medication 1000 MILLIGRAM(S): at 09:00

## 2024-09-13 RX ADMIN — DIAZEPAM 5 MILLIGRAM(S): 5 TABLET ORAL at 05:46

## 2024-09-13 RX ADMIN — Medication 100 MILLIGRAM(S): at 13:35

## 2024-09-13 RX ADMIN — Medication 0.5 MILLIGRAM(S): at 12:06

## 2024-09-13 RX ADMIN — OXYCODONE HYDROCHLORIDE 5 MILLIGRAM(S): 30 TABLET ORAL at 21:28

## 2024-09-13 RX ADMIN — Medication 2 MILLIGRAM(S): at 20:22

## 2024-09-13 RX ADMIN — METOPROLOL SUCCINATE 5 MILLIGRAM(S): 50 TABLET, EXTENDED RELEASE ORAL at 18:37

## 2024-09-13 RX ADMIN — Medication 2 TABLET(S): at 09:47

## 2024-09-13 RX ADMIN — Medication 40 MILLIGRAM(S): at 13:35

## 2024-09-13 RX ADMIN — Medication 20 MILLIGRAM(S): at 06:14

## 2024-09-13 RX ADMIN — Medication 1000 MILLILITER(S): at 05:43

## 2024-09-13 RX ADMIN — Medication 0.5 MILLIGRAM(S): at 10:25

## 2024-09-13 RX ADMIN — Medication 2000 MILLILITER(S): at 16:26

## 2024-09-13 RX ADMIN — PHENOBARBITAL 424 MILLIGRAM(S): 30 TABLET ORAL at 21:00

## 2024-09-13 RX ADMIN — POTASSIUM PHOSPHATE, MONOBASIC POTASSIUM PHOSPHATE, DIBASIC INJECTION, 83.33 MILLIMOLE(S): 236; 224 SOLUTION, CONCENTRATE INTRAVENOUS at 09:46

## 2024-09-13 RX ADMIN — Medication 40 MILLIEQUIVALENT(S): at 14:52

## 2024-09-13 RX ADMIN — Medication 0.5 MILLIGRAM(S): at 13:06

## 2024-09-13 RX ADMIN — SODIUM CHLORIDE 150 MILLILITER(S): 9 INJECTION, SOLUTION INTRAVENOUS at 16:29

## 2024-09-13 RX ADMIN — POTASSIUM PHOSPHATE, MONOBASIC POTASSIUM PHOSPHATE, DIBASIC INJECTION, 30 MILLIMOLE(S): 236; 224 SOLUTION, CONCENTRATE INTRAVENOUS at 16:27

## 2024-09-13 RX ADMIN — PHENOBARBITAL 432 MILLIGRAM(S): 30 TABLET ORAL at 17:54

## 2024-09-14 ENCOUNTER — TRANSCRIPTION ENCOUNTER (OUTPATIENT)
Age: 59
End: 2024-09-14

## 2024-09-14 DIAGNOSIS — E87.6 HYPOKALEMIA: ICD-10-CM

## 2024-09-14 DIAGNOSIS — E83.39 OTHER DISORDERS OF PHOSPHORUS METABOLISM: ICD-10-CM

## 2024-09-14 LAB
A1C WITH ESTIMATED AVERAGE GLUCOSE RESULT: 5.5 % — SIGNIFICANT CHANGE UP (ref 4–5.6)
ALBUMIN SERPL ELPH-MCNC: 2.7 G/DL — LOW (ref 3.3–5)
ALBUMIN SERPL ELPH-MCNC: 2.8 G/DL — LOW (ref 3.3–5)
ALBUMIN SERPL ELPH-MCNC: 3.3 G/DL — SIGNIFICANT CHANGE UP (ref 3.3–5)
ALP SERPL-CCNC: 68 U/L — SIGNIFICANT CHANGE UP (ref 40–120)
ALP SERPL-CCNC: 78 U/L — SIGNIFICANT CHANGE UP (ref 40–120)
ALP SERPL-CCNC: 78 U/L — SIGNIFICANT CHANGE UP (ref 40–120)
ALT FLD-CCNC: 4422 U/L — HIGH (ref 12–78)
ALT FLD-CCNC: 4697 U/L — HIGH (ref 12–78)
ALT FLD-CCNC: 5396 U/L — HIGH (ref 12–78)
AMMONIA BLD-MCNC: 121 UMOL/L — HIGH (ref 11–32)
AMMONIA BLD-MCNC: 132 UMOL/L — HIGH (ref 11–32)
ANION GAP SERPL CALC-SCNC: 10 MMOL/L — SIGNIFICANT CHANGE UP (ref 5–17)
ANION GAP SERPL CALC-SCNC: 11 MMOL/L — SIGNIFICANT CHANGE UP (ref 5–17)
ANION GAP SERPL CALC-SCNC: 9 MMOL/L — SIGNIFICANT CHANGE UP (ref 5–17)
APAP SERPL-MCNC: 11 UG/ML — SIGNIFICANT CHANGE UP (ref 10–30)
APTT BLD: 43.2 SEC — HIGH (ref 24.5–35.6)
AST SERPL-CCNC: HIGH U/L (ref 15–37)
AST SERPL-CCNC: SIGNIFICANT CHANGE UP U/L (ref 15–37)
AST SERPL-CCNC: SIGNIFICANT CHANGE UP U/L (ref 15–37)
BASE EXCESS BLDA CALC-SCNC: -7 MMOL/L — LOW (ref -2–3)
BASOPHILS # BLD AUTO: 0 K/UL — SIGNIFICANT CHANGE UP (ref 0–0.2)
BASOPHILS NFR BLD AUTO: 0 % — SIGNIFICANT CHANGE UP (ref 0–2)
BILIRUB DIRECT SERPL-MCNC: 3.2 MG/DL — HIGH (ref 0–0.3)
BILIRUB INDIRECT FLD-MCNC: 2.1 MG/DL — HIGH (ref 0.2–1)
BILIRUB SERPL-MCNC: 4.7 MG/DL — HIGH (ref 0.2–1.2)
BILIRUB SERPL-MCNC: 5 MG/DL — HIGH (ref 0.2–1.2)
BILIRUB SERPL-MCNC: 5.3 MG/DL — HIGH (ref 0.2–1.2)
BLD GP AB SCN SERPL QL: SIGNIFICANT CHANGE UP
BLOOD GAS COMMENTS ARTERIAL: SIGNIFICANT CHANGE UP
BUN SERPL-MCNC: 15 MG/DL — SIGNIFICANT CHANGE UP (ref 7–23)
BUN SERPL-MCNC: 17 MG/DL — SIGNIFICANT CHANGE UP (ref 7–23)
BUN SERPL-MCNC: 17 MG/DL — SIGNIFICANT CHANGE UP (ref 7–23)
CALCIUM SERPL-MCNC: 7.1 MG/DL — LOW (ref 8.5–10.1)
CALCIUM SERPL-MCNC: 7.9 MG/DL — LOW (ref 8.5–10.1)
CALCIUM SERPL-MCNC: 8 MG/DL — LOW (ref 8.5–10.1)
CHLORIDE SERPL-SCNC: 108 MMOL/L — SIGNIFICANT CHANGE UP (ref 96–108)
CHLORIDE SERPL-SCNC: 109 MMOL/L — HIGH (ref 96–108)
CHLORIDE SERPL-SCNC: 110 MMOL/L — HIGH (ref 96–108)
CK MB CFR SERPL CALC: 3.3 NG/ML — SIGNIFICANT CHANGE UP (ref 0.5–3.6)
CK SERPL-CCNC: 911 U/L — HIGH (ref 26–308)
CK SERPL-CCNC: 947 U/L — HIGH (ref 26–308)
CO2 BLDA-SCNC: 17 MMOL/L — LOW (ref 19–24)
CO2 SERPL-SCNC: 16 MMOL/L — LOW (ref 22–31)
CO2 SERPL-SCNC: 18 MMOL/L — LOW (ref 22–31)
CO2 SERPL-SCNC: 18 MMOL/L — LOW (ref 22–31)
CREAT SERPL-MCNC: 1.52 MG/DL — HIGH (ref 0.5–1.3)
CREAT SERPL-MCNC: 1.69 MG/DL — HIGH (ref 0.5–1.3)
CREAT SERPL-MCNC: 1.9 MG/DL — HIGH (ref 0.5–1.3)
D DIMER BLD IA.RAPID-MCNC: HIGH NG/ML DDU
EGFR: 41 ML/MIN/1.73M2 — LOW
EGFR: 41 ML/MIN/1.73M2 — LOW
EGFR: 47 ML/MIN/1.73M2 — LOW
EGFR: 47 ML/MIN/1.73M2 — LOW
EGFR: 53 ML/MIN/1.73M2 — LOW
EGFR: 53 ML/MIN/1.73M2 — LOW
EOSINOPHIL # BLD AUTO: 0.01 K/UL — SIGNIFICANT CHANGE UP (ref 0–0.5)
EOSINOPHIL NFR BLD AUTO: 0.2 % — SIGNIFICANT CHANGE UP (ref 0–6)
ESTIMATED AVERAGE GLUCOSE: 111 MG/DL — SIGNIFICANT CHANGE UP (ref 68–114)
FIBRINOGEN PPP-MCNC: 40 MG/DL — CRITICAL LOW (ref 200–480)
FIBRINOGEN PPP-MCNC: 71 MG/DL — CRITICAL LOW (ref 200–480)
GAS PNL BLDA: SIGNIFICANT CHANGE UP
GAS PNL BLDA: SIGNIFICANT CHANGE UP
GLUCOSE BLDC GLUCOMTR-MCNC: 248 MG/DL — HIGH (ref 70–99)
GLUCOSE BLDC GLUCOMTR-MCNC: 276 MG/DL — HIGH (ref 70–99)
GLUCOSE BLDC GLUCOMTR-MCNC: 39 MG/DL — CRITICAL LOW (ref 70–99)
GLUCOSE BLDC GLUCOMTR-MCNC: 39 MG/DL — CRITICAL LOW (ref 70–99)
GLUCOSE BLDC GLUCOMTR-MCNC: 90 MG/DL — SIGNIFICANT CHANGE UP (ref 70–99)
GLUCOSE SERPL-MCNC: 202 MG/DL — HIGH (ref 70–99)
GLUCOSE SERPL-MCNC: 253 MG/DL — HIGH (ref 70–99)
GLUCOSE SERPL-MCNC: 37 MG/DL — CRITICAL LOW (ref 70–99)
HAPTOGLOB SERPL-MCNC: 43 MG/DL — SIGNIFICANT CHANGE UP (ref 34–200)
HAV IGM SER-ACNC: SIGNIFICANT CHANGE UP
HBV CORE IGM SER-ACNC: SIGNIFICANT CHANGE UP
HBV SURFACE AG SER-ACNC: SIGNIFICANT CHANGE UP
HCO3 BLDA-SCNC: 16 MMOL/L — LOW (ref 21–28)
HCT VFR BLD CALC: 22.6 % — LOW (ref 39–50)
HCT VFR BLD CALC: 24.3 % — LOW (ref 39–50)
HCT VFR BLD CALC: 24.9 % — LOW (ref 39–50)
HCT VFR BLD CALC: 28.1 % — LOW (ref 39–50)
HCV AB S/CO SERPL IA: 0.14 S/CO — SIGNIFICANT CHANGE UP (ref 0–0.99)
HCV AB SERPL-IMP: SIGNIFICANT CHANGE UP
HGB BLD-MCNC: 7 G/DL — CRITICAL LOW (ref 13–17)
HGB BLD-MCNC: 7.5 G/DL — LOW (ref 13–17)
HGB BLD-MCNC: 7.8 G/DL — LOW (ref 13–17)
HGB BLD-MCNC: 8.7 G/DL — LOW (ref 13–17)
HOROWITZ INDEX BLDA+IHG-RTO: 28 — SIGNIFICANT CHANGE UP
IMM GRANULOCYTES NFR BLD AUTO: 0.4 % — SIGNIFICANT CHANGE UP (ref 0–0.9)
INR BLD: 4.56 RATIO — HIGH (ref 0.85–1.18)
INR BLD: 5.94 RATIO — CRITICAL HIGH (ref 0.85–1.18)
LACTATE SERPL-SCNC: 6.1 MMOL/L — CRITICAL HIGH (ref 0.7–2)
LACTATE SERPL-SCNC: 7.2 MMOL/L — CRITICAL HIGH (ref 0.7–2)
LYMPHOCYTES # BLD AUTO: 0.17 K/UL — LOW (ref 1–3.3)
LYMPHOCYTES # BLD AUTO: 3.8 % — LOW (ref 13–44)
MAGNESIUM SERPL-MCNC: 1.8 MG/DL — SIGNIFICANT CHANGE UP (ref 1.6–2.6)
MAGNESIUM SERPL-MCNC: 2.2 MG/DL — SIGNIFICANT CHANGE UP (ref 1.6–2.6)
MAGNESIUM SERPL-MCNC: 2.5 MG/DL — SIGNIFICANT CHANGE UP (ref 1.6–2.6)
MCHC RBC-ENTMCNC: 23.8 PG — LOW (ref 27–34)
MCHC RBC-ENTMCNC: 23.9 PG — LOW (ref 27–34)
MCHC RBC-ENTMCNC: 24.1 PG — LOW (ref 27–34)
MCHC RBC-ENTMCNC: 24.1 PG — LOW (ref 27–34)
MCHC RBC-ENTMCNC: 30.9 G/DL — LOW (ref 32–36)
MCHC RBC-ENTMCNC: 31 G/DL — LOW (ref 32–36)
MCHC RBC-ENTMCNC: 31 G/DL — LOW (ref 32–36)
MCHC RBC-ENTMCNC: 31.3 G/DL — LOW (ref 32–36)
MCV RBC AUTO: 75.9 FL — LOW (ref 80–100)
MCV RBC AUTO: 77.2 FL — LOW (ref 80–100)
MCV RBC AUTO: 77.7 FL — LOW (ref 80–100)
MCV RBC AUTO: 78.1 FL — LOW (ref 80–100)
MONOCYTES # BLD AUTO: 0.04 K/UL — SIGNIFICANT CHANGE UP (ref 0–0.9)
MONOCYTES NFR BLD AUTO: 0.9 % — LOW (ref 2–14)
NEUTROPHILS # BLD AUTO: 4.27 K/UL — SIGNIFICANT CHANGE UP (ref 1.8–7.4)
NEUTROPHILS NFR BLD AUTO: 94.7 % — HIGH (ref 43–77)
NRBC # BLD: 0 /100 WBCS — SIGNIFICANT CHANGE UP (ref 0–0)
NRBC BLD-RTO: 0 /100 WBCS — SIGNIFICANT CHANGE UP (ref 0–0)
OSMOLALITY SERPL: 296 MOSMOL/KG — SIGNIFICANT CHANGE UP (ref 275–300)
PCO2 BLDA: 26 MMHG — LOW (ref 32–46)
PH BLDA: 7.4 — SIGNIFICANT CHANGE UP (ref 7.35–7.45)
PHOSPHATE SERPL-MCNC: 1.6 MG/DL — LOW (ref 2.5–4.5)
PHOSPHATE SERPL-MCNC: 2.3 MG/DL — LOW (ref 2.5–4.5)
PHOSPHATE SERPL-MCNC: 3.1 MG/DL — SIGNIFICANT CHANGE UP (ref 2.5–4.5)
PLATELET # BLD AUTO: 23 K/UL — LOW (ref 150–400)
PLATELET # BLD AUTO: 7 K/UL — CRITICAL LOW (ref 150–400)
PLATELET # BLD AUTO: 7 K/UL — CRITICAL LOW (ref 150–400)
PLATELET # BLD AUTO: 8 K/UL — CRITICAL LOW (ref 150–400)
PO2 BLDA: 99 MMHG — SIGNIFICANT CHANGE UP (ref 83–108)
POTASSIUM SERPL-MCNC: 5.1 MMOL/L — SIGNIFICANT CHANGE UP (ref 3.5–5.3)
POTASSIUM SERPL-MCNC: 5.5 MMOL/L — HIGH (ref 3.5–5.3)
POTASSIUM SERPL-MCNC: 5.5 MMOL/L — HIGH (ref 3.5–5.3)
POTASSIUM SERPL-SCNC: 5.1 MMOL/L — SIGNIFICANT CHANGE UP (ref 3.5–5.3)
POTASSIUM SERPL-SCNC: 5.5 MMOL/L — HIGH (ref 3.5–5.3)
POTASSIUM SERPL-SCNC: 5.5 MMOL/L — HIGH (ref 3.5–5.3)
PROT SERPL-MCNC: 5 GM/DL — LOW (ref 6–8.3)
PROT SERPL-MCNC: 5.4 GM/DL — LOW (ref 6–8.3)
PROT SERPL-MCNC: 6 GM/DL — SIGNIFICANT CHANGE UP (ref 6–8.3)
PROTHROM AB SERPL-ACNC: 51.9 SEC — HIGH (ref 9.5–13)
PROTHROM AB SERPL-ACNC: 67.1 SEC — HIGH (ref 9.5–13)
RBC # BLD: 2.91 M/UL — LOW (ref 4.2–5.8)
RBC # BLD: 3.11 M/UL — LOW (ref 4.2–5.8)
RBC # BLD: 3.28 M/UL — LOW (ref 4.2–5.8)
RBC # BLD: 3.64 M/UL — LOW (ref 4.2–5.8)
RBC # FLD: 20.2 % — HIGH (ref 10.3–14.5)
RBC # FLD: 20.3 % — HIGH (ref 10.3–14.5)
RBC # FLD: 20.7 % — HIGH (ref 10.3–14.5)
RBC # FLD: 20.9 % — HIGH (ref 10.3–14.5)
SAO2 % BLDA: 98.6 % — HIGH (ref 94–98)
SODIUM SERPL-SCNC: 134 MMOL/L — LOW (ref 135–145)
SODIUM SERPL-SCNC: 136 MMOL/L — SIGNIFICANT CHANGE UP (ref 135–145)
SODIUM SERPL-SCNC: 139 MMOL/L — SIGNIFICANT CHANGE UP (ref 135–145)
TROPONIN I, HIGH SENSITIVITY RESULT: 14.2 NG/L — SIGNIFICANT CHANGE UP
WBC # BLD: 4.51 K/UL — SIGNIFICANT CHANGE UP (ref 3.8–10.5)
WBC # BLD: 4.56 K/UL — SIGNIFICANT CHANGE UP (ref 3.8–10.5)
WBC # BLD: 5.54 K/UL — SIGNIFICANT CHANGE UP (ref 3.8–10.5)
WBC # BLD: 6.47 K/UL — SIGNIFICANT CHANGE UP (ref 3.8–10.5)
WBC # FLD AUTO: 4.51 K/UL — SIGNIFICANT CHANGE UP (ref 3.8–10.5)
WBC # FLD AUTO: 4.56 K/UL — SIGNIFICANT CHANGE UP (ref 3.8–10.5)
WBC # FLD AUTO: 5.54 K/UL — SIGNIFICANT CHANGE UP (ref 3.8–10.5)
WBC # FLD AUTO: 6.47 K/UL — SIGNIFICANT CHANGE UP (ref 3.8–10.5)

## 2024-09-14 PROCEDURE — 76937 US GUIDE VASCULAR ACCESS: CPT | Mod: 26,59

## 2024-09-14 PROCEDURE — 99232 SBSQ HOSP IP/OBS MODERATE 35: CPT

## 2024-09-14 PROCEDURE — 71045 X-RAY EXAM CHEST 1 VIEW: CPT | Mod: 26

## 2024-09-14 PROCEDURE — 93976 VASCULAR STUDY: CPT | Mod: 26

## 2024-09-14 PROCEDURE — 36000 PLACE NEEDLE IN VEIN: CPT | Mod: 59

## 2024-09-14 PROCEDURE — 99291 CRITICAL CARE FIRST HOUR: CPT

## 2024-09-14 PROCEDURE — 99292 CRITICAL CARE ADDL 30 MIN: CPT

## 2024-09-14 RX ORDER — CALCIUM GLUCONATE 20 MG/ML
1 INJECTION, SOLUTION INTRAVENOUS ONCE
Refills: 0 | Status: COMPLETED | OUTPATIENT
Start: 2024-09-14 | End: 2024-09-14

## 2024-09-14 RX ORDER — SODIUM PHOSPHATE,DIBASIC DIHYD
15 POWDER (GRAM) MISCELLANEOUS ONCE
Refills: 0 | Status: COMPLETED | OUTPATIENT
Start: 2024-09-14 | End: 2024-09-14

## 2024-09-14 RX ORDER — DEXTROSE 50 % IN WATER 50 %
50 SYRINGE (ML) INTRAVENOUS ONCE
Refills: 0 | Status: DISCONTINUED | OUTPATIENT
Start: 2024-09-14 | End: 2024-09-14

## 2024-09-14 RX ORDER — CEFTRIAXONE 500 MG/1
1000 INJECTION, POWDER, FOR SOLUTION INTRAMUSCULAR; INTRAVENOUS ONCE
Refills: 0 | Status: COMPLETED | OUTPATIENT
Start: 2024-09-14 | End: 2024-09-14

## 2024-09-14 RX ORDER — FOLIC ACID 1 MG/1
1 TABLET ORAL
Qty: 0 | Refills: 0 | DISCHARGE
Start: 2024-09-14

## 2024-09-14 RX ORDER — ACETYLCYSTEINE 200 MG/ML
3.9 INHALANT RESPIRATORY (INHALATION) ONCE
Refills: 0 | Status: DISCONTINUED | OUTPATIENT
Start: 2024-09-14 | End: 2024-09-14

## 2024-09-14 RX ORDER — FOLIC ACID 1 MG/1
1 TABLET ORAL DAILY
Refills: 0 | Status: DISCONTINUED | OUTPATIENT
Start: 2024-09-14 | End: 2024-09-15

## 2024-09-14 RX ORDER — DEXTROSE 50 % IN WATER 50 %
50 SYRINGE (ML) INTRAVENOUS ONCE
Refills: 0 | Status: COMPLETED | OUTPATIENT
Start: 2024-09-14 | End: 2024-09-14

## 2024-09-14 RX ORDER — ACETYLCYSTEINE 200 MG/ML
8 INHALANT RESPIRATORY (INHALATION) ONCE
Refills: 0 | Status: DISCONTINUED | OUTPATIENT
Start: 2024-09-14 | End: 2024-09-15

## 2024-09-14 RX ORDER — SODIUM PHOSPHATE,DIBASIC DIHYD
30 POWDER (GRAM) MISCELLANEOUS ONCE
Refills: 0 | Status: DISCONTINUED | OUTPATIENT
Start: 2024-09-14 | End: 2024-09-14

## 2024-09-14 RX ORDER — ACETYLCYSTEINE 200 MG/ML
12 INHALANT RESPIRATORY (INHALATION) ONCE
Refills: 0 | Status: COMPLETED | OUTPATIENT
Start: 2024-09-14 | End: 2024-09-14

## 2024-09-14 RX ORDER — ACETYLCYSTEINE 200 MG/ML
7.8 INHALANT RESPIRATORY (INHALATION) ONCE
Refills: 0 | Status: COMPLETED | OUTPATIENT
Start: 2024-09-14 | End: 2024-09-14

## 2024-09-14 RX ORDER — MAGNESIUM SULFATE 500 MG/ML
2 SYRINGE (ML) INJECTION ONCE
Refills: 0 | Status: COMPLETED | OUTPATIENT
Start: 2024-09-14 | End: 2024-09-14

## 2024-09-14 RX ORDER — CEFTRIAXONE 500 MG/1
INJECTION, POWDER, FOR SOLUTION INTRAMUSCULAR; INTRAVENOUS
Refills: 0 | Status: DISCONTINUED | OUTPATIENT
Start: 2024-09-14 | End: 2024-09-15

## 2024-09-14 RX ORDER — SODIUM BICARBONATE 1 MEQ/ML
0.29 SYRINGE (ML) INTRAVENOUS
Qty: 150 | Refills: 0 | Status: DISCONTINUED | OUTPATIENT
Start: 2024-09-14 | End: 2024-09-15

## 2024-09-14 RX ORDER — LACTULOSE 10 G/15ML
200 SOLUTION ORAL ONCE
Refills: 0 | Status: COMPLETED | OUTPATIENT
Start: 2024-09-14 | End: 2024-09-14

## 2024-09-14 RX ORDER — CEFTRIAXONE 500 MG/1
1000 INJECTION, POWDER, FOR SOLUTION INTRAMUSCULAR; INTRAVENOUS EVERY 24 HOURS
Refills: 0 | Status: DISCONTINUED | OUTPATIENT
Start: 2024-09-15 | End: 2024-09-15

## 2024-09-14 RX ADMIN — Medication 5 UNIT(S): at 12:57

## 2024-09-14 RX ADMIN — Medication 2 MILLIGRAM(S): at 08:24

## 2024-09-14 RX ADMIN — Medication 50 MILLILITER(S): at 12:58

## 2024-09-14 RX ADMIN — FOLIC ACID 1 MILLIGRAM(S): 1 TABLET ORAL at 17:48

## 2024-09-14 RX ADMIN — LACTULOSE 200 GRAM(S): 10 SOLUTION ORAL at 23:08

## 2024-09-14 RX ADMIN — Medication 150 MEQ/KG/HR: at 22:52

## 2024-09-14 RX ADMIN — SODIUM CHLORIDE 150 MILLILITER(S): 9 INJECTION, SOLUTION INTRAVENOUS at 12:37

## 2024-09-14 RX ADMIN — CALCIUM GLUCONATE 100 GRAM(S): 20 INJECTION, SOLUTION INTRAVENOUS at 13:49

## 2024-09-14 RX ADMIN — SODIUM CHLORIDE 150 MILLILITER(S): 9 INJECTION, SOLUTION INTRAVENOUS at 04:15

## 2024-09-14 RX ADMIN — Medication 1 APPLICATION(S): at 12:36

## 2024-09-14 RX ADMIN — Medication 105 MILLIGRAM(S): at 22:34

## 2024-09-14 RX ADMIN — ACETYLCYSTEINE 260 GRAM(S): 200 INHALANT RESPIRATORY (INHALATION) at 17:48

## 2024-09-14 RX ADMIN — Medication 150 MEQ/KG/HR: at 13:35

## 2024-09-14 RX ADMIN — Medication 400 MILLIGRAM(S): at 09:31

## 2024-09-14 RX ADMIN — Medication 2 MILLIGRAM(S): at 05:02

## 2024-09-14 RX ADMIN — ACETYLCYSTEINE 134.75 GRAM(S): 200 INHALANT RESPIRATORY (INHALATION) at 20:23

## 2024-09-14 RX ADMIN — CEFTRIAXONE 100 MILLIGRAM(S): 500 INJECTION, POWDER, FOR SOLUTION INTRAMUSCULAR; INTRAVENOUS at 13:49

## 2024-09-14 RX ADMIN — Medication 25 GRAM(S): at 11:05

## 2024-09-14 RX ADMIN — Medication 62.5 MILLIMOLE(S): at 17:48

## 2024-09-14 RX ADMIN — PHENOBARBITAL 424 MILLIGRAM(S): 30 TABLET ORAL at 01:05

## 2024-09-14 RX ADMIN — Medication 40 MILLIGRAM(S): at 12:58

## 2024-09-14 RX ADMIN — Medication 1 GRAM(S): at 05:25

## 2024-09-15 ENCOUNTER — INPATIENT (INPATIENT)
Facility: HOSPITAL | Age: 59
LOS: 25 days | Discharge: HOME | DRG: 432 | End: 2024-10-11
Attending: INTERNAL MEDICINE | Admitting: INTERNAL MEDICINE
Payer: MEDICAID

## 2024-09-15 VITALS
OXYGEN SATURATION: 95 % | TEMPERATURE: 99 F | SYSTOLIC BLOOD PRESSURE: 140 MMHG | HEART RATE: 112 BPM | DIASTOLIC BLOOD PRESSURE: 83 MMHG | RESPIRATION RATE: 36 BRPM

## 2024-09-15 VITALS
HEART RATE: 104 BPM | RESPIRATION RATE: 25 BRPM | DIASTOLIC BLOOD PRESSURE: 83 MMHG | OXYGEN SATURATION: 100 % | SYSTOLIC BLOOD PRESSURE: 156 MMHG

## 2024-09-15 DIAGNOSIS — K72.90 HEPATIC FAILURE, UNSPECIFIED WITHOUT COMA: ICD-10-CM

## 2024-09-15 LAB
ALBUMIN SERPL ELPH-MCNC: 2.5 G/DL — LOW (ref 3.3–5)
ALBUMIN SERPL ELPH-MCNC: 2.9 G/DL — LOW (ref 3.3–5)
ALBUMIN SERPL ELPH-MCNC: 3 G/DL — LOW (ref 3.3–5)
ALBUMIN SERPL ELPH-MCNC: 3.1 G/DL — LOW (ref 3.3–5)
ALP SERPL-CCNC: 105 U/L — SIGNIFICANT CHANGE UP (ref 40–120)
ALP SERPL-CCNC: 115 U/L — SIGNIFICANT CHANGE UP (ref 40–120)
ALP SERPL-CCNC: 82 U/L — SIGNIFICANT CHANGE UP (ref 40–120)
ALP SERPL-CCNC: 91 U/L — SIGNIFICANT CHANGE UP (ref 40–120)
ALT FLD-CCNC: 4897 U/L — HIGH (ref 10–45)
ALT FLD-CCNC: 5346 U/L — HIGH (ref 10–45)
ALT FLD-CCNC: 5408 U/L — HIGH (ref 10–45)
ALT FLD-CCNC: 5982 U/L — HIGH (ref 12–78)
AMMONIA BLD-MCNC: 82 UMOL/L — HIGH (ref 11–32)
ANION GAP SERPL CALC-SCNC: 12 MMOL/L — SIGNIFICANT CHANGE UP (ref 5–17)
ANION GAP SERPL CALC-SCNC: 16 MMOL/L — SIGNIFICANT CHANGE UP (ref 5–17)
ANION GAP SERPL CALC-SCNC: 19 MMOL/L — HIGH (ref 5–17)
ANION GAP SERPL CALC-SCNC: 20 MMOL/L — HIGH (ref 5–17)
ANISOCYTOSIS BLD QL: SLIGHT — SIGNIFICANT CHANGE UP
APAP SERPL-MCNC: 8 UG/ML — LOW (ref 10–30)
APTT BLD: 35.4 SEC — SIGNIFICANT CHANGE UP (ref 24.5–35.6)
APTT BLD: 36.7 SEC — HIGH (ref 24.5–35.6)
APTT BLD: 38.9 SEC — HIGH (ref 24.5–35.6)
APTT BLD: 39.8 SEC — HIGH (ref 24.5–35.6)
AST SERPL-CCNC: HIGH U/L (ref 10–40)
AST SERPL-CCNC: HIGH U/L (ref 15–37)
BASE EXCESS BLDV CALC-SCNC: 0.5 MMOL/L — SIGNIFICANT CHANGE UP (ref -2–3)
BASOPHILS # BLD AUTO: 0 K/UL — SIGNIFICANT CHANGE UP (ref 0–0.2)
BASOPHILS # BLD AUTO: 0.01 K/UL — SIGNIFICANT CHANGE UP (ref 0–0.2)
BASOPHILS NFR BLD AUTO: 0 % — SIGNIFICANT CHANGE UP (ref 0–2)
BASOPHILS NFR BLD AUTO: 0.1 % — SIGNIFICANT CHANGE UP (ref 0–2)
BILIRUB DIRECT SERPL-MCNC: 3.2 MG/DL — HIGH (ref 0–0.3)
BILIRUB INDIRECT FLD-MCNC: 2.5 MG/DL — HIGH (ref 0.2–1)
BILIRUB SERPL-MCNC: 5.6 MG/DL — HIGH (ref 0.2–1.2)
BILIRUB SERPL-MCNC: 5.7 MG/DL — HIGH (ref 0.2–1.2)
BILIRUB SERPL-MCNC: 6.4 MG/DL — HIGH (ref 0.2–1.2)
BILIRUB SERPL-MCNC: 6.7 MG/DL — HIGH (ref 0.2–1.2)
BLOOD GAS COMMENTS, VENOUS: SIGNIFICANT CHANGE UP
BUN SERPL-MCNC: 11 MG/DL — SIGNIFICANT CHANGE UP (ref 7–23)
BUN SERPL-MCNC: 12 MG/DL — SIGNIFICANT CHANGE UP (ref 7–23)
BUN SERPL-MCNC: 12 MG/DL — SIGNIFICANT CHANGE UP (ref 7–23)
BUN SERPL-MCNC: 14 MG/DL — SIGNIFICANT CHANGE UP (ref 7–23)
CALCIUM SERPL-MCNC: 7.6 MG/DL — LOW (ref 8.5–10.1)
CALCIUM SERPL-MCNC: 7.7 MG/DL — LOW (ref 8.4–10.5)
CALCIUM SERPL-MCNC: 7.8 MG/DL — LOW (ref 8.4–10.5)
CALCIUM SERPL-MCNC: 7.9 MG/DL — LOW (ref 8.4–10.5)
CHLORIDE BLDV-SCNC: 101 MMOL/L — SIGNIFICANT CHANGE UP (ref 98–107)
CHLORIDE SERPL-SCNC: 101 MMOL/L — SIGNIFICANT CHANGE UP (ref 96–108)
CHLORIDE SERPL-SCNC: 102 MMOL/L — SIGNIFICANT CHANGE UP (ref 96–108)
CHLORIDE SERPL-SCNC: 102 MMOL/L — SIGNIFICANT CHANGE UP (ref 96–108)
CHLORIDE SERPL-SCNC: 99 MMOL/L — SIGNIFICANT CHANGE UP (ref 96–108)
CK SERPL-CCNC: 725 U/L — HIGH (ref 30–200)
CO2 BLDV-SCNC: 25 MMOL/L — SIGNIFICANT CHANGE UP (ref 22–26)
CO2 SERPL-SCNC: 20 MMOL/L — LOW (ref 22–31)
CO2 SERPL-SCNC: 21 MMOL/L — LOW (ref 22–31)
CO2 SERPL-SCNC: 22 MMOL/L — SIGNIFICANT CHANGE UP (ref 22–31)
CO2 SERPL-SCNC: 24 MMOL/L — SIGNIFICANT CHANGE UP (ref 22–31)
CREAT SERPL-MCNC: 1.15 MG/DL — SIGNIFICANT CHANGE UP (ref 0.5–1.3)
CREAT SERPL-MCNC: 1.17 MG/DL — SIGNIFICANT CHANGE UP (ref 0.5–1.3)
CREAT SERPL-MCNC: 1.21 MG/DL — SIGNIFICANT CHANGE UP (ref 0.5–1.3)
CREAT SERPL-MCNC: 1.52 MG/DL — HIGH (ref 0.5–1.3)
D DIMER BLD IA.RAPID-MCNC: HIGH NG/ML DDU
EGFR: 53 ML/MIN/1.73M2 — LOW
EGFR: 53 ML/MIN/1.73M2 — LOW
EGFR: 70 ML/MIN/1.73M2 — SIGNIFICANT CHANGE UP
EGFR: 73 ML/MIN/1.73M2 — SIGNIFICANT CHANGE UP
EGFR: 74 ML/MIN/1.73M2 — SIGNIFICANT CHANGE UP
EOSINOPHIL # BLD AUTO: 0.02 K/UL — SIGNIFICANT CHANGE UP (ref 0–0.5)
EOSINOPHIL # BLD AUTO: 0.06 K/UL — SIGNIFICANT CHANGE UP (ref 0–0.5)
EOSINOPHIL NFR BLD AUTO: 0.2 % — SIGNIFICANT CHANGE UP (ref 0–6)
EOSINOPHIL NFR BLD AUTO: 0.9 % — SIGNIFICANT CHANGE UP (ref 0–6)
FIBRINOGEN PPP-MCNC: 60 MG/DL — CRITICAL LOW (ref 200–480)
FIBRINOGEN PPP-MCNC: 87 MG/DL — CRITICAL LOW (ref 200–445)
GAS PNL BLDA: SIGNIFICANT CHANGE UP
GAS PNL BLDV: 133 MMOL/L — LOW (ref 136–145)
GAS PNL BLDV: SIGNIFICANT CHANGE UP
GLUCOSE BLDV-MCNC: 243 MG/DL — HIGH (ref 65–95)
GLUCOSE SERPL-MCNC: 136 MG/DL — HIGH (ref 70–99)
GLUCOSE SERPL-MCNC: 181 MG/DL — HIGH (ref 70–99)
GLUCOSE SERPL-MCNC: 210 MG/DL — HIGH (ref 70–99)
GLUCOSE SERPL-MCNC: 97 MG/DL — SIGNIFICANT CHANGE UP (ref 70–99)
HAV IGM SER-ACNC: SIGNIFICANT CHANGE UP
HBV CORE IGM SER-ACNC: SIGNIFICANT CHANGE UP
HBV SURFACE AG SER-ACNC: SIGNIFICANT CHANGE UP
HCO3 BLDV-SCNC: 24 MMOL/L — SIGNIFICANT CHANGE UP (ref 22–28)
HCT VFR BLD CALC: 25.9 % — LOW (ref 39–50)
HCT VFR BLD CALC: 26.5 % — LOW (ref 39–50)
HCT VFR BLD CALC: 28.6 % — LOW (ref 39–50)
HCT VFR BLDA CALC: 28 % — LOW (ref 37–47)
HCV AB S/CO SERPL IA: 0.11 S/CO — SIGNIFICANT CHANGE UP (ref 0–0.99)
HCV AB SERPL-IMP: SIGNIFICANT CHANGE UP
HGB BLD CALC-MCNC: 9.3 G/DL — LOW (ref 12.6–17.4)
HGB BLD-MCNC: 8.3 G/DL — LOW (ref 13–17)
HGB BLD-MCNC: 8.5 G/DL — LOW (ref 13–17)
HGB BLD-MCNC: 9 G/DL — LOW (ref 13–17)
IMM GRANULOCYTES NFR BLD AUTO: 0.8 % — SIGNIFICANT CHANGE UP (ref 0–0.9)
INR BLD: 6.26 RATIO — CRITICAL HIGH (ref 0.85–1.18)
INR BLD: 6.5 RATIO — CRITICAL HIGH (ref 0.85–1.18)
INR BLD: 7.14 RATIO — CRITICAL HIGH (ref 0.85–1.18)
INR BLD: 7.31 RATIO — CRITICAL HIGH (ref 0.85–1.18)
LACTATE BLDV-MCNC: 5.6 MMOL/L — CRITICAL HIGH (ref 0.56–1.39)
LACTATE SERPL-SCNC: 5.8 MMOL/L — CRITICAL HIGH (ref 0.7–2)
LYMPHOCYTES # BLD AUTO: 0.18 K/UL — LOW (ref 1–3.3)
LYMPHOCYTES # BLD AUTO: 0.27 K/UL — LOW (ref 1–3.3)
LYMPHOCYTES # BLD AUTO: 2.6 % — LOW (ref 13–44)
LYMPHOCYTES # BLD AUTO: 3.2 % — LOW (ref 13–44)
MACROCYTES BLD QL: SLIGHT — SIGNIFICANT CHANGE UP
MAGNESIUM SERPL-MCNC: 1.7 MG/DL — SIGNIFICANT CHANGE UP (ref 1.6–2.6)
MAGNESIUM SERPL-MCNC: 1.8 MG/DL — SIGNIFICANT CHANGE UP (ref 1.6–2.6)
MAGNESIUM SERPL-MCNC: 1.9 MG/DL — SIGNIFICANT CHANGE UP (ref 1.6–2.6)
MAGNESIUM SERPL-MCNC: 2 MG/DL — SIGNIFICANT CHANGE UP (ref 1.6–2.6)
MANUAL SMEAR VERIFICATION: SIGNIFICANT CHANGE UP
MCHC RBC-ENTMCNC: 23.9 PG — LOW (ref 27–34)
MCHC RBC-ENTMCNC: 23.9 PG — LOW (ref 27–34)
MCHC RBC-ENTMCNC: 24 PG — LOW (ref 27–34)
MCHC RBC-ENTMCNC: 31.5 GM/DL — LOW (ref 32–36)
MCHC RBC-ENTMCNC: 32 G/DL — SIGNIFICANT CHANGE UP (ref 32–36)
MCHC RBC-ENTMCNC: 32.1 GM/DL — SIGNIFICANT CHANGE UP (ref 32–36)
MCV RBC AUTO: 74.6 FL — LOW (ref 80–100)
MCV RBC AUTO: 74.9 FL — LOW (ref 80–100)
MCV RBC AUTO: 75.9 FL — LOW (ref 80–100)
MICROCYTES BLD QL: SLIGHT — SIGNIFICANT CHANGE UP
MONOCYTES # BLD AUTO: 0 K/UL — SIGNIFICANT CHANGE UP (ref 0–0.9)
MONOCYTES # BLD AUTO: 0.1 K/UL — SIGNIFICANT CHANGE UP (ref 0–0.9)
MONOCYTES NFR BLD AUTO: 0 % — LOW (ref 2–14)
MONOCYTES NFR BLD AUTO: 1.2 % — LOW (ref 2–14)
NEUTROPHILS # BLD AUTO: 6.64 K/UL — SIGNIFICANT CHANGE UP (ref 1.8–7.4)
NEUTROPHILS # BLD AUTO: 8.05 K/UL — HIGH (ref 1.8–7.4)
NEUTROPHILS NFR BLD AUTO: 94.5 % — HIGH (ref 43–77)
NEUTROPHILS NFR BLD AUTO: 96.5 % — HIGH (ref 43–77)
NRBC # BLD: 0 /100 WBCS — SIGNIFICANT CHANGE UP (ref 0–0)
NRBC # BLD: 0 /100 WBCS — SIGNIFICANT CHANGE UP (ref 0–0)
NRBC # BLD: 1 /100 WBCS — HIGH (ref 0–0)
NRBC BLD-RTO: 0 /100 WBCS — SIGNIFICANT CHANGE UP (ref 0–0)
OVALOCYTES BLD QL SMEAR: SLIGHT — SIGNIFICANT CHANGE UP
PAPPENHEIMER BOD BLD QL SMEAR: PRESENT — SIGNIFICANT CHANGE UP
PCO2 BLDV: 35 MMHG — LOW (ref 42–55)
PH BLDV: 7.45 — HIGH (ref 7.32–7.43)
PHOSPHATE SERPL-MCNC: 1.7 MG/DL — LOW (ref 2.5–4.5)
PHOSPHATE SERPL-MCNC: 2 MG/DL — LOW (ref 2.5–4.5)
PHOSPHATE SERPL-MCNC: 2.5 MG/DL — SIGNIFICANT CHANGE UP (ref 2.5–4.5)
PHOSPHATE SERPL-MCNC: 2.9 MG/DL — SIGNIFICANT CHANGE UP (ref 2.5–4.5)
PLAT MORPH BLD: NORMAL — SIGNIFICANT CHANGE UP
PLATELET # BLD AUTO: 24 K/UL — LOW (ref 150–400)
PLATELET # BLD AUTO: 26 K/UL — LOW (ref 150–400)
PLATELET # BLD AUTO: 27 K/UL — LOW (ref 150–400)
PLATELET MAPPING ACTF MAX AMPLITUDE: 2.3 MM — SIGNIFICANT CHANGE UP (ref 2–19)
PLATELET MAPPING ADP MAXIMUM AMPLITUDE: <10 MM — LOW (ref 45–69)
PLATELET MAPPING ADP PERCENT INHIBITION: SIGNIFICANT CHANGE UP (ref 0–17)
PLATELET MAPPING ARACHIDONIC ACID INHIBITION: SIGNIFICANT CHANGE UP (ref 0–11)
PLATELET MAPPING HKH MAXIMUM AMPLITUDE: <42 MM — LOW (ref 53–68)
PO2 BLDV: 38 MMHG — SIGNIFICANT CHANGE UP (ref 25–45)
POIKILOCYTOSIS BLD QL AUTO: SLIGHT — SIGNIFICANT CHANGE UP
POTASSIUM BLDV-SCNC: 3.9 MMOL/L — SIGNIFICANT CHANGE UP (ref 3.5–5.1)
POTASSIUM SERPL-MCNC: 3.1 MMOL/L — LOW (ref 3.5–5.3)
POTASSIUM SERPL-MCNC: 3.3 MMOL/L — LOW (ref 3.5–5.3)
POTASSIUM SERPL-MCNC: 3.5 MMOL/L — SIGNIFICANT CHANGE UP (ref 3.5–5.3)
POTASSIUM SERPL-MCNC: 3.6 MMOL/L — SIGNIFICANT CHANGE UP (ref 3.5–5.3)
POTASSIUM SERPL-SCNC: 3.1 MMOL/L — LOW (ref 3.5–5.3)
POTASSIUM SERPL-SCNC: 3.3 MMOL/L — LOW (ref 3.5–5.3)
POTASSIUM SERPL-SCNC: 3.5 MMOL/L — SIGNIFICANT CHANGE UP (ref 3.5–5.3)
POTASSIUM SERPL-SCNC: 3.6 MMOL/L — SIGNIFICANT CHANGE UP (ref 3.5–5.3)
PROT SERPL-MCNC: 5.1 GM/DL — LOW (ref 6–8.3)
PROT SERPL-MCNC: 5.2 G/DL — LOW (ref 6–8.3)
PROT SERPL-MCNC: 5.4 G/DL — LOW (ref 6–8.3)
PROT SERPL-MCNC: 5.4 G/DL — LOW (ref 6–8.3)
PROTHROM AB SERPL-ACNC: 65.3 SEC — HIGH (ref 9.5–13)
PROTHROM AB SERPL-ACNC: 73.2 SEC — HIGH (ref 9.5–13)
PROTHROM AB SERPL-ACNC: 74.2 SEC — HIGH (ref 9.5–13)
PROTHROM AB SERPL-ACNC: 75.9 SEC — HIGH (ref 9.5–13)
RAPIDTEG MAXIMUM AMPLITUDE: <40 MM — LOW (ref 52–70)
RBC # BLD: 3.47 M/UL — LOW (ref 4.2–5.8)
RBC # BLD: 3.54 M/UL — LOW (ref 4.2–5.8)
RBC # BLD: 3.77 M/UL — LOW (ref 4.2–5.8)
RBC # FLD: 19.9 % — HIGH (ref 10.3–14.5)
RBC # FLD: 19.9 % — HIGH (ref 10.3–14.5)
RBC # FLD: 20.1 % — HIGH (ref 10.3–14.5)
RBC BLD AUTO: ABNORMAL
SAO2 % BLDV: 61.8 % — LOW (ref 94–98)
SODIUM SERPL-SCNC: 138 MMOL/L — SIGNIFICANT CHANGE UP (ref 135–145)
SODIUM SERPL-SCNC: 138 MMOL/L — SIGNIFICANT CHANGE UP (ref 135–145)
SODIUM SERPL-SCNC: 141 MMOL/L — SIGNIFICANT CHANGE UP (ref 135–145)
SODIUM SERPL-SCNC: 141 MMOL/L — SIGNIFICANT CHANGE UP (ref 135–145)
TEG FUNCTIONAL FIBRINOGEN: <4 MM — LOW (ref 15–32)
TEG LY30 (LYSIS): 0 % — SIGNIFICANT CHANGE UP (ref 0–2.6)
TEG REACTION TIME: 12.2 MIN — HIGH (ref 4.6–9.1)
WBC # BLD: 6.88 K/UL — SIGNIFICANT CHANGE UP (ref 3.8–10.5)
WBC # BLD: 8.07 K/UL — SIGNIFICANT CHANGE UP (ref 3.8–10.5)
WBC # BLD: 8.52 K/UL — SIGNIFICANT CHANGE UP (ref 3.8–10.5)
WBC # FLD AUTO: 6.88 K/UL — SIGNIFICANT CHANGE UP (ref 3.8–10.5)
WBC # FLD AUTO: 8.07 K/UL — SIGNIFICANT CHANGE UP (ref 3.8–10.5)
WBC # FLD AUTO: 8.52 K/UL — SIGNIFICANT CHANGE UP (ref 3.8–10.5)

## 2024-09-15 PROCEDURE — 71045 X-RAY EXAM CHEST 1 VIEW: CPT | Mod: 26

## 2024-09-15 PROCEDURE — 99291 CRITICAL CARE FIRST HOUR: CPT | Mod: GC

## 2024-09-15 PROCEDURE — 99232 SBSQ HOSP IP/OBS MODERATE 35: CPT

## 2024-09-15 PROCEDURE — 99238 HOSP IP/OBS DSCHRG MGMT 30/<: CPT

## 2024-09-15 RX ORDER — FOLIC ACID 1 MG/1
1 TABLET ORAL DAILY
Refills: 0 | Status: DISCONTINUED | OUTPATIENT
Start: 2024-09-15 | End: 2024-10-02

## 2024-09-15 RX ORDER — METHYLPREDNISOLONE ACETATE 80 MG/ML
80 INJECTION, SUSPENSION INTRA-ARTICULAR; INTRALESIONAL; INTRAMUSCULAR; SOFT TISSUE DAILY
Refills: 0 | Status: DISCONTINUED | OUTPATIENT
Start: 2024-09-15 | End: 2024-09-16

## 2024-09-15 RX ORDER — POTASSIUM PHOSPHATE, MONOBASIC POTASSIUM PHOSPHATE, DIBASIC INJECTION, 236; 224 MG/ML; MG/ML
15 SOLUTION, CONCENTRATE INTRAVENOUS ONCE
Refills: 0 | Status: DISCONTINUED | OUTPATIENT
Start: 2024-09-15 | End: 2024-09-15

## 2024-09-15 RX ORDER — MAGNESIUM SULFATE 500 MG/ML
2 VIAL (ML) INJECTION ONCE
Refills: 0 | Status: COMPLETED | OUTPATIENT
Start: 2024-09-15 | End: 2024-09-15

## 2024-09-15 RX ORDER — PHYTONADIONE (VIT K1)
10 CRYSTALS MISCELLANEOUS DAILY
Refills: 0 | Status: COMPLETED | OUTPATIENT
Start: 2024-09-15 | End: 2024-09-17

## 2024-09-15 RX ORDER — PANTOPRAZOLE SODIUM 40 MG/1
40 TABLET, DELAYED RELEASE ORAL
Refills: 0 | Status: DISCONTINUED | OUTPATIENT
Start: 2024-09-15 | End: 2024-10-02

## 2024-09-15 RX ORDER — POTASSIUM PHOSPHATE, MONOBASIC POTASSIUM PHOSPHATE, DIBASIC 224; 236 MG/ML; MG/ML
30 INJECTION, SOLUTION, CONCENTRATE INTRAVENOUS ONCE
Refills: 0 | Status: COMPLETED | OUTPATIENT
Start: 2024-09-15 | End: 2024-09-15

## 2024-09-15 RX ORDER — ACETYLCYSTEINE
8 POWDER (GRAM) MISCELLANEOUS ONCE
Refills: 0 | Status: COMPLETED | OUTPATIENT
Start: 2024-09-15 | End: 2024-09-15

## 2024-09-15 RX ORDER — FENTANYL CITRATE-0.9 % NACL/PF 300MCG/30
50 PATIENT CONTROLLED ANALGESIA VIAL INJECTION ONCE
Refills: 0 | Status: DISCONTINUED | OUTPATIENT
Start: 2024-09-15 | End: 2024-09-15

## 2024-09-15 RX ORDER — LACTULOSE 10 G/15ML
200 SOLUTION ORAL; RECTAL EVERY 8 HOURS
Refills: 0 | Status: DISCONTINUED | OUTPATIENT
Start: 2024-09-15 | End: 2024-09-17

## 2024-09-15 RX ORDER — LACTULOSE 10 G/15ML
30 SOLUTION ORAL; RECTAL EVERY 6 HOURS
Refills: 0 | Status: DISCONTINUED | OUTPATIENT
Start: 2024-09-15 | End: 2024-09-15

## 2024-09-15 RX ORDER — THIAMINE HYDROCHLORIDE 100 MG/ML
500 INJECTION, SOLUTION INTRAMUSCULAR; INTRAVENOUS DAILY
Refills: 0 | Status: COMPLETED | OUTPATIENT
Start: 2024-09-15 | End: 2024-09-17

## 2024-09-15 RX ADMIN — POTASSIUM PHOSPHATE, MONOBASIC POTASSIUM PHOSPHATE, DIBASIC 83.33 MILLIMOLE(S): 224; 236 INJECTION, SOLUTION, CONCENTRATE INTRAVENOUS at 10:47

## 2024-09-15 RX ADMIN — Medication 65 GRAM(S): at 09:20

## 2024-09-15 RX ADMIN — PANTOPRAZOLE SODIUM 40 MILLIGRAM(S): 40 TABLET, DELAYED RELEASE ORAL at 17:02

## 2024-09-15 RX ADMIN — Medication 50 MICROGRAM(S): at 19:15

## 2024-09-15 RX ADMIN — Medication 25 GRAM(S): at 10:39

## 2024-09-15 RX ADMIN — METHYLPREDNISOLONE ACETATE 80 MILLIGRAM(S): 80 INJECTION, SUSPENSION INTRA-ARTICULAR; INTRALESIONAL; INTRAMUSCULAR; SOFT TISSUE at 17:01

## 2024-09-15 RX ADMIN — Medication 102 MILLIGRAM(S): at 10:47

## 2024-09-15 RX ADMIN — LACTULOSE 200 GRAM(S): 10 SOLUTION ORAL; RECTAL at 21:11

## 2024-09-15 RX ADMIN — THIAMINE HYDROCHLORIDE 105 MILLIGRAM(S): 100 INJECTION, SOLUTION INTRAMUSCULAR; INTRAVENOUS at 11:01

## 2024-09-15 RX ADMIN — Medication 50 MICROGRAM(S): at 19:00

## 2024-09-15 RX ADMIN — FOLIC ACID 1 MILLIGRAM(S): 1 TABLET ORAL at 11:00

## 2024-09-15 RX ADMIN — Medication 1 APPLICATION(S): at 04:00

## 2024-09-15 NOTE — H&P ADULT - NSHPPHYSICALEXAM_GEN_ALL_CORE
LOS:     VITALS:   T(C): 37.1 (09-15-24 @ 06:45), Max: 37.1 (09-15-24 @ 06:45)  HR: 109 (09-15-24 @ 06:50) (92 - 119)  BP: 140/83 (09-15-24 @ 06:45) (73/44 - 187/76)  RR: 30 (09-15-24 @ 06:50) (15 - 36)  SpO2: 100% (09-15-24 @ 06:50) (90% - 100%)    GENERAL: NAD  HEAD:  Atraumatic, Normocephalic  EYES: EOMI, PERRLA, conjunctiva and sclera clear  ENT: Moist mucous membranes  NECK: Supple, No elevated JVP.  CHEST/LUNG: Clear to auscultation bilaterally; No rales, rhonchi, or wheezes.   HEART: Regular rate and rhythm; No murmurs, rubs, or gallops  ABDOMEN: Bowel sounds present; Soft, nontender, nondistended  EXTREMITIES:  2+ Peripheral Pulses, brisk capillary refill. No clubbing, cyanosis, or edema  NERVOUS SYSTEM:  A&Ox0, no focal deficits   SKIN: No rashes or lesions

## 2024-09-15 NOTE — H&P ADULT - HISTORY OF PRESENT ILLNESS
Patient is a 58 y/o male with pmh of ETOH abuse, and gastritis who initially presented to OSH due to concern of abdominal pain. Patient underwent an evaluation for acute     59 years old male with h/o alcohol abuse, gastritis present to ED with alcohol withdrawal, nausea, vomiting and upper abdominal pain. Patient reported last alcohol use was 2 days ago. Has multiple episode of nonbloody vomiting and unable to tolerate oral intake. No fever or chills  Tremulous, tachycardic, hypertensive, slightly improved with benzo. No leukocytosis, plt 51, K 3.4, Cr 1.51, phosphorus 1, magnesium 1.6, AST/ALT 2161/675, total bili 2.8. CT abd/pelvis with severe fatty liver. Nondistended gallbladder with diffuse nonspecific wall edema. Ultrasound abd with Distended gallbladder with wall thickening up to 5 mm and trace pericholecystic fluid without evidence of cholelithiasis or biliary dilatation. Negative sonographic Gibson's sign     Patient is a 58 y/o male with pmh of ETOH abuse, and gastritis who initially presented to OSH due to concern of abdominal pain. Patient underwent an evaluation for acute cholecystitis and was treated for ETOH withdrawal. Patient presented with elevated LFTs that started to uptrend and patient has now been transferred to [facility] for further evaluation by the Hepatology team. Patient is currently altered and is unable to provide much collateral. Patient was treated with NAC at OSH.

## 2024-09-15 NOTE — H&P ADULT - ATTENDING COMMENTS
58 yo M w/ PMHx alcohol abuse, frequent admissions for alcohol withdrawal and alcohol induced gastritis admitted to [facility] for abdominal pain and elevated LFTs.  Rapid response was called today for unresponsiveness and hypotension and hypoglycemia.  Found to have an elevated AST ALT in the thousands bilirubin to 5.3 as well as acute anemia and thrombocytopenia and elevated coagulation factors concerning for possible DIC versus worsening liver failure.  Etiology of rapid rise in LFTs per Hepatology thought to be d/t  combination of alcoholic hepatitis as well as ischemic hepatitis Due to abrupt decompensation transferred to [facility] hepatology eval.  Patient is not a candidate for transplant at this time.      #Neuro: metabolic encephalopathy due to acute liver failure and hyperammonemia.  Will continue to monitor at this time as patient is protecting his airway.  NGT placement ASAP to initiate lactulose, if unable to place will start lactulose rectally.  #CV:  Maintaining MAP greater than 65 after IVF rescuscitation - not on pressors.  On bedside POCUS patient has mildly reduced LV function.    #Pulm: Patient at this time is protecting his airway.  Appropriate respiratory compensation for metabolic acidosis.   Monitor for intubation should mental status deteriorate.  #GI: Patient with significant rise in LFTs as well as decrease in synthetic liver functions concerning for fulminant liver failure - per family started Tylenol recently, Tylenol level elevated at 42, started on NAC. Administer Vit K. Started Solumedrol for acute alcoholic hepatitis. Continue to monitor LFTs and coags.  Maintain NPO.  PPI BID given history of gastritis and anemia however no signs of active bleeding.  Autoimmune hepatitis labs sent; hep serology negative.  CT abdomen pelvis also showed nonspecific findings consistent with cholecystitis with pericholecystic edema and slightly thickened gallbladder wall.  Continue Ceftriaxone. Obtain RUQ Abd US w/ duplex.  #Renal: ROSA improving, monitor UOP, lytes   #ID: Continue with ceftriaxone for presumed cholecystitis.  Follow-up blood cultures.  #Endo: FS q6h while NPO  #Heme: s/p platelets/cryo at OSH, fibrinogen remains low, obtain TEG, administer additional cryo, monitor platelets, transfuse if develops active bleeding for goal >50; SCDs for VTE prophylaxis  #Ethics: Full code.    Shavon Minor MD, MSCR

## 2024-09-15 NOTE — CHART NOTE - NSCHARTNOTEFT_GEN_A_CORE
:    INDICATION:    PROCEDURE:  [x] LIMITED ECHO  [x ] LIMITED CHEST  [ ] LIMITED RETROPERITONEAL  [ ] LIMITED ABDOMINAL  [ ] LIMITED DVT  [ ] NEEDLE GUIDANCE VASCULAR  [ ] NEEDLE GUIDANCE THORACENTESIS  [ ] NEEDLE GUIDANCE PARACENTESIS  [ ] NEEDLE GUIDANCE PERICARDIOCENTESIS  [ ] OTHER    FINDINGS: Cindy predominance with few focal Blines R> L on the anterior/lateral chest wall, no effusion or consolidations noted  Grossly normal LV function, no RV dilation, IVC=1.4cm, trace pericardial effusion noted      INTERPRETATION: Overall normal cardiac function. 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

## 2024-09-15 NOTE — H&P ADULT - NSHPLABSRESULTS_GEN_ALL_CORE
.  LABS:                         8.3    8.52  )-----------( 24       ( 15 Sep 2024 04:15 )             25.9     09-15    138  |  102  |  14  ----------------------------<  210<H>  3.6   |  24  |  1.52<H>    Ca    7.6<L>      15 Sep 2024 04:15  Phos  1.7     09-15  Mg     1.9     09-15    TPro  5.1<L>  /  Alb  2.5<L>  /  TBili  5.7<H>  /  DBili  3.2<H>  /  AST  88358<H>  /  ALT  5982<H>  /  AlkPhos  82  09-15    PT/INR - ( 15 Sep 2024 04:15 )   PT: 73.2 sec;   INR: 6.50 ratio         PTT - ( 15 Sep 2024 04:15 )  PTT:39.8 sec  Urinalysis Basic - ( 15 Sep 2024 04:15 )    Color: x / Appearance: x / SG: x / pH: x  Gluc: 210 mg/dL / Ketone: x  / Bili: x / Urobili: x   Blood: x / Protein: x / Nitrite: x   Leuk Esterase: x / RBC: x / WBC x   Sq Epi: x / Non Sq Epi: x / Bacteria: x        Lactate, Blood: 5.8 mmol/L (09-15 @ 04:15)      RADIOLOGY, EKG & ADDITIONAL TESTS: Reviewed.

## 2024-09-15 NOTE — CONSULT NOTE ADULT - SUBJECTIVE AND OBJECTIVE BOX
HPI:    59 years old male with h/o hronic ETOH abuse and dependence (1 bottle of Vodka a day) with long standing hx of alcohol withdrawal and DTs with frequent hospital/ICU admissions, alcoholic gastritis/esophagitis, PUD, HLD, hx of hypoxic respiratory failure requiring intubation due to aspiration PNA (most recent intubation Aril 2020),  staph PNA, COVID-19 infection Dec 2020 presented with abd pain at the OSH. Patient underwent an evaluation for acute cholecystitis and was treated for ETOH withdrawal. Patient presented with elevated LFTs that started to uptrend and patient has now been transferred to [facility] for further evaluation by the Hepatology team for acute on chronic liver failure.     Allergies:  No Known Allergies      Hospital Medications:  fentaNYL    Injectable 50 MICROGram(s) IV Push once  folic acid Injectable 1 milliGRAM(s) IV Push daily  lactulose Syrup 30 Gram(s) Oral every 6 hours  methylPREDNISolone sodium succinate Injectable 80 milliGRAM(s) IV Push daily  pantoprazole  Injectable 40 milliGRAM(s) IV Push two times a day  phytonadione  IVPB 10 milliGRAM(s) IV Intermittent daily  thiamine IVPB 500 milliGRAM(s) IV Intermittent daily      PMHX/PSHX:  Alcohol abuse    PUD (peptic ulcer disease)    Mixed hyperlipidemia    EtOH dependence    Gastritis    Substance abuse    DTs (delirium tremens)    HTN (hypertension)    Elevated lipase    No significant past surgical history        Family history:  No pertinent family history in first degree relatives (Father, Mother)    No pertinent family history in first degree relatives    No pertinent family history in first degree relatives    FH: HTN (hypertension)        Denies family history of colon cancer/polyps, stomach cancer/polyps, pancreatic cancer/masses, liver cancer/disease, ovarian cancer and endometrial cancer.    Social History:   Tob: Denies  EtOH: Denies  Illicit Drugs: Denies    ROS:     Unknown.     PHYSICAL EXAM:     GENERAL:  Lethargic  HEENT:  NCAT, no scleral icterus   CHEST:  no respiratory distress  HEART:  Regular rate and rhythm  ABDOMEN:  Soft, non-tender, moderately distended,   EXTREMITIES: No edema  SKIN:  No rash/erythema/ecchymoses/petechiae/wounds/abscess/warm/dry  NEURO:  Lethargic.     Vital Signs:  Vital Signs Last 24 Hrs  T(C): 37 (15 Sep 2024 16:00), Max: 37.2 (15 Sep 2024 08:00)  T(F): 98.6 (15 Sep 2024 16:00), Max: 99 (15 Sep 2024 12:00)  HR: 107 (15 Sep 2024 17:00) (100 - 112)  BP: 159/78 (15 Sep 2024 17:00) (139/61 - 210/89)  BP(mean): 109 (15 Sep 2024 17:00) (80 - 128)  RR: 25 (15 Sep 2024 17:00) (16 - 36)  SpO2: 100% (15 Sep 2024 17:00) (90% - 100%)    Parameters below as of 15 Sep 2024 16:28  Patient On (Oxygen Delivery Method): nasal cannula      Daily Height in cm: 182.88 (15 Sep 2024 07:00)    Daily     LABS:                        8.5    8.07  )-----------( 26       ( 15 Sep 2024 08:25 )             26.5     Mean Cell Volume: 74.9 fl (09-15-24 @ 08:25)    09-15    141  |  99  |  12  ----------------------------<  136<H>  3.3<L>   |  22  |  1.21    Ca    7.9<L>      15 Sep 2024 14:23  Phos  2.5     09-15  Mg     2.0     09-15    TPro  5.4<L>  /  Alb  3.0<L>  /  TBili  6.4<H>  /  DBili  x   /  AST  51932<H>  /  ALT  5408<H>  /  AlkPhos  105  09-15    LIVER FUNCTIONS - ( 15 Sep 2024 14:23 )  Alb: 3.0 g/dL / Pro: 5.4 g/dL / ALK PHOS: 105 U/L / ALT: 5408 U/L / AST: 59597 U/L / GGT: x           PT/INR - ( 15 Sep 2024 14:23 )   PT: 74.2 sec;   INR: 7.14 ratio         PTT - ( 15 Sep 2024 14:23 )  PTT:36.7 sec  Urinalysis Basic - ( 15 Sep 2024 14:23 )    Color: x / Appearance: x / SG: x / pH: x  Gluc: 136 mg/dL / Ketone: x  / Bili: x / Urobili: x   Blood: x / Protein: x / Nitrite: x   Leuk Esterase: x / RBC: x / WBC x   Sq Epi: x / Non Sq Epi: x / Bacteria: x      Amylase Serum--      Lipase serum--       [id number]                          8.5    8.07  )-----------( 26       ( 15 Sep 2024 08:25 )             26.5                         8.3    8.52  )-----------( 24       ( 15 Sep 2024 04:15 )             25.9                         7.8    6.47  )-----------( 23       ( 14 Sep 2024 17:21 )             24.9                         7.5    5.54  )-----------( 7        ( 14 Sep 2024 13:22 )             24.3                         7.0    4.51  )-----------( 7        ( 14 Sep 2024 11:30 )             22.6       Imaging:

## 2024-09-15 NOTE — CONSULT NOTE ADULT - ASSESSMENT
Impression:   59 years old male with h/o hronic ETOH abuse and dependence (1 bottle of Vodka a day) with long standing hx of alcohol withdrawal and DTs with frequent hospital/ICU admissions, alcoholic gastritis/esophagitis, PUD, HLD, hx of hypoxic respiratory failure requiring intubation due to aspiration PNA (most recent intubation Aril 2020),  staph PNA, COVID-19 infection Dec 2020 presented with abd pain at the OSH.    #Decompensated alcoholic cirrhosis  #Acute on chronic liver failure  - Presented with AMS at OSH. Had multiple hospitalizations in the past for alcohol withdrawal and DT per records. Unclear last alcohol drink. His alcohol level this admission was <10 and Tylenol level was 42 on admission.   - In terms of liver enzymes, patient had normal liver enzymes on 8/2024 when he was admitted for withdrawal with high alcohol level. On 9/13 admission, his AST/ALT 2000/675 with bili 2.8. Now increased AST 24436/ALT 5000s, stable since yesterday. INR has been increasing from 1.25 to 7.25. Patient has received multiple blood products to decrease his INR. Concerned that he might be going into DIC. Initially was hypotensive and required pressors at the OSH. Currently not on pressor support.   - RUQ US: Distended gallbladder with wall thickening up to 5 mm and trace pericholecystic fluid without evidence of cholelithiasis or biliary dilatation. Negative sonographic Gibson's sign.   - CTAP: Nondistended gallbladder with diffuse nonspecific wall edema. Severe fatty liver.  - Could be due to Tylenol use in the setting of underlying liver disease causing acute elevation in his liver enzymes. However, could also be due to ischemia as he was hypotensive at OSH, concerned for possible sepsis. GB distended concerning for acute cholecystitis, no other sources of infection. No ascites seen on imaging.   - Other lab serologies: CMV, EBV PCR neg, Hep B/C neg.     Recommendations:   - Continue with NAC drip.   - Place NGT.   - Continue with lactulose and rifaximin, goal 3-4 BMs per day.   - Please send HBV and HCV RNA levels.   - please send alpha-1 anti-trypsin (phenotype), ceruloplasm, AFP  - please send CHANO, anti-mitochondrial antibody, anti-smooth muscle antibody, anti-liver kidney antibody, immunoglobulins (IgG quantitative) for autoimmune etiologies   - Please obtain abd US with doppler.   - Patient is unlikely a candidate for LT due to multiple admissions in the past with alcohol withdrawal.     Discussed the case with Dr. Patel.       All recommendations are tentative until note is attested by an attending.     Edvin Garsia, PGY-6  Gastroenterology/Hepatology Fellow  Available on Microsoft Teams  [phone] (Short Range Pager)  [phone] (Long Range Pager)    After 5pm, please contact the on-call GI fellow.

## 2024-09-15 NOTE — H&P ADULT - ASSESSMENT
Patient is a 59 year old M with PMHx ETOH abuse with frequent admissions for withdrawal & gastritis admitted to ICU for acute on chronic decompensated liver failure & alcohol withdrawal.     # Neuro:  Acute Metabolic Encephalopathy   -Likely secondary to elevated ammonia level along with liver failure.   - -Will start lactulose     ETOH Withdawal    #Resp:   - 2L NC; satting adequately to maintain sats>92%; titrate FIo2 as tolerated   - protecting airway  - end tidal CO2    #CV:  -Patient not on any vasopressors at this time. Hemodynamically stable.     #GI:  // Acute Decompensated Liver Failure - patient with long history of etoh abuse with evidence of hepatic steatosis on imaging.    - RUQ US: Distended gallbladder with wall thickening up to 5 mm and trace pericholecystic fluid without evidence of cholelithiasis or biliary dilatation. Negative sonographic Gibson's sign.   - CTAP: Nondistended gallbladder with diffuse nonspecific wall edema. Severe fatty liver.  - labs significant for thrombocytopenia, transaminitis, anemia, elevated bilirubin & decreased fibrinogen, elevated lactate.   Plan  -Continue Thiamine and Folate  -Continue NAC   -Hepatology consulted.       #Renal:  //ROSA   -Pre-renal vs ATN  -Monitor Cr and Urine output  -Renally dose medication        #ID:  - afebrile, wbc nl  - cont to monitor off abx     #Heme:  Concern for DIC   - elevated coags, d-dimer & fibrinogen 40  - S/P 1u cryo & 1 FFP in setting of DIC and liver failure   - FU CBC. tranfuse for Hg <7     #Endo:  - cont to monitor FS Q6H while NPO.        Patient is a 59 year old M with PMHx ETOH abuse with frequent admissions for withdrawal & gastritis admitted to ICU for acute on chronic decompensated liver failure & alcohol withdrawal.     # Neuro:  Acute Metabolic Encephalopathy   -Likely secondary to elevated ammonia level along with liver failure.   - -Will start lactulose after placing NGT    ETOH Withdawal  Symptom triggered CIWA started     #Resp:   - 2L NC; satting adequately to maintain sats>92%; titrate FIo2 as tolerated   - protecting airway  - end tidal CO2    #CV:  -Patient not on any vasopressors at this time. Hemodynamically stable.     #GI:  // Acute Decompensated Liver Failure - patient with long history of etoh abuse with evidence of hepatic steatosis on imaging.    - RUQ US: Distended gallbladder with wall thickening up to 5 mm and trace pericholecystic fluid without evidence of cholelithiasis or biliary dilatation. Negative sonographic Gibson's sign.   - CTAP: Nondistended gallbladder with diffuse nonspecific wall edema. Severe fatty liver.  - labs significant for thrombocytopenia, transaminitis, anemia, elevated bilirubin & decreased fibrinogen, elevated lactate.   Plan  -Continue Thiamine and Folate  -Continue NAC   -Hepatology consulted.   -F/U Abdomen doppler results   -Ordered solumedrol 80mg daily       #Renal:  //ROSA   -Pre-renal vs ATN  -Monitor Cr and Urine output  -Renally dose medication        #ID:  - afebrile, wbc nl  - cont to monitor off abx     #Heme:  Concern for DIC   - elevated coags, d-dimer & fibrinogen 40  - S/P 1u cryo & 1 FFP in setting of DIC and liver failure   - FU CBC. tranfuse for Hg <7   -Ordered another unit cryoprecipitate and Q6 DIC labs   -Vitamin K ordered     #Endo:  - cont to monitor FS Q6H while NPO.

## 2024-09-16 LAB
ADD ON TEST-SPECIMEN IN LAB: SIGNIFICANT CHANGE UP
ALBUMIN SERPL ELPH-MCNC: 2.7 G/DL — LOW (ref 3.3–5)
ALBUMIN SERPL ELPH-MCNC: 2.7 G/DL — LOW (ref 3.3–5)
ALBUMIN SERPL ELPH-MCNC: 2.9 G/DL — LOW (ref 3.3–5)
ALP SERPL-CCNC: 103 U/L — SIGNIFICANT CHANGE UP (ref 40–120)
ALP SERPL-CCNC: 104 U/L — SIGNIFICANT CHANGE UP (ref 40–120)
ALP SERPL-CCNC: 113 U/L — SIGNIFICANT CHANGE UP (ref 40–120)
ALT FLD-CCNC: 3601 U/L — HIGH (ref 10–45)
ALT FLD-CCNC: 3659 U/L — HIGH (ref 10–45)
ALT FLD-CCNC: 4456 U/L — HIGH (ref 10–45)
AMMONIA BLD-MCNC: 102 UMOL/L — HIGH (ref 11–55)
ANION GAP SERPL CALC-SCNC: 16 MMOL/L — SIGNIFICANT CHANGE UP (ref 5–17)
ANION GAP SERPL CALC-SCNC: 17 MMOL/L — SIGNIFICANT CHANGE UP (ref 5–17)
ANION GAP SERPL CALC-SCNC: 18 MMOL/L — HIGH (ref 5–17)
APAP SERPL-MCNC: <15 UG/ML — SIGNIFICANT CHANGE UP (ref 10–30)
APTT BLD: 34.1 SEC — SIGNIFICANT CHANGE UP (ref 24.5–35.6)
APTT BLD: 36 SEC — HIGH (ref 24.5–35.6)
AST SERPL-CCNC: 5905 U/L — HIGH (ref 10–40)
AST SERPL-CCNC: 5937 U/L — HIGH (ref 10–40)
AST SERPL-CCNC: 9219 U/L — HIGH (ref 10–40)
BASOPHILS # BLD AUTO: 0 K/UL — SIGNIFICANT CHANGE UP (ref 0–0.2)
BASOPHILS # BLD AUTO: 0 K/UL — SIGNIFICANT CHANGE UP (ref 0–0.2)
BASOPHILS NFR BLD AUTO: 0 % — SIGNIFICANT CHANGE UP (ref 0–2)
BASOPHILS NFR BLD AUTO: 0 % — SIGNIFICANT CHANGE UP (ref 0–2)
BILIRUB DIRECT SERPL-MCNC: 4 MG/DL — HIGH (ref 0–0.3)
BILIRUB INDIRECT FLD-MCNC: 3.2 MG/DL — HIGH (ref 0.2–1)
BILIRUB SERPL-MCNC: 6.5 MG/DL — HIGH (ref 0.2–1.2)
BILIRUB SERPL-MCNC: 6.8 MG/DL — HIGH (ref 0.2–1.2)
BILIRUB SERPL-MCNC: 7.2 MG/DL — HIGH (ref 0.2–1.2)
BILIRUB SERPL-MCNC: 7.2 MG/DL — HIGH (ref 0.2–1.2)
BUN SERPL-MCNC: 12 MG/DL — SIGNIFICANT CHANGE UP (ref 7–23)
BUN SERPL-MCNC: 16 MG/DL — SIGNIFICANT CHANGE UP (ref 7–23)
BUN SERPL-MCNC: 16 MG/DL — SIGNIFICANT CHANGE UP (ref 7–23)
CALCIUM SERPL-MCNC: 7.8 MG/DL — LOW (ref 8.4–10.5)
CALCIUM SERPL-MCNC: 7.8 MG/DL — LOW (ref 8.4–10.5)
CALCIUM SERPL-MCNC: 7.9 MG/DL — LOW (ref 8.4–10.5)
CERULOPLASMIN SERPL-MCNC: 12 MG/DL — LOW (ref 15–30)
CHLORIDE SERPL-SCNC: 102 MMOL/L — SIGNIFICANT CHANGE UP (ref 96–108)
CO2 SERPL-SCNC: 20 MMOL/L — LOW (ref 22–31)
CO2 SERPL-SCNC: 21 MMOL/L — LOW (ref 22–31)
CO2 SERPL-SCNC: 21 MMOL/L — LOW (ref 22–31)
CREAT SERPL-MCNC: 1.2 MG/DL — SIGNIFICANT CHANGE UP (ref 0.5–1.3)
CREAT SERPL-MCNC: 1.22 MG/DL — SIGNIFICANT CHANGE UP (ref 0.5–1.3)
CREAT SERPL-MCNC: 1.22 MG/DL — SIGNIFICANT CHANGE UP (ref 0.5–1.3)
CREAT SERPL-MCNC: 1.24 MG/DL — SIGNIFICANT CHANGE UP (ref 0.5–1.3)
D DIMER BLD IA.RAPID-MCNC: HIGH NG/ML DDU
EGFR: 68 ML/MIN/1.73M2 — SIGNIFICANT CHANGE UP
EGFR: 69 ML/MIN/1.73M2 — SIGNIFICANT CHANGE UP
EGFR: 69 ML/MIN/1.73M2 — SIGNIFICANT CHANGE UP
EGFR: 71 ML/MIN/1.73M2 — SIGNIFICANT CHANGE UP
EOSINOPHIL # BLD AUTO: 0 K/UL — SIGNIFICANT CHANGE UP (ref 0–0.5)
EOSINOPHIL # BLD AUTO: 0 K/UL — SIGNIFICANT CHANGE UP (ref 0–0.5)
EOSINOPHIL NFR BLD AUTO: 0 % — SIGNIFICANT CHANGE UP (ref 0–6)
EOSINOPHIL NFR BLD AUTO: 0 % — SIGNIFICANT CHANGE UP (ref 0–6)
FIBRINOGEN PPP-MCNC: 80 MG/DL — CRITICAL LOW (ref 200–445)
GAS PNL BLDV: SIGNIFICANT CHANGE UP
GLUCOSE BLDC GLUCOMTR-MCNC: 130 MG/DL — HIGH (ref 70–99)
GLUCOSE SERPL-MCNC: 202 MG/DL — HIGH (ref 70–99)
GLUCOSE SERPL-MCNC: 203 MG/DL — HIGH (ref 70–99)
GLUCOSE SERPL-MCNC: 88 MG/DL — SIGNIFICANT CHANGE UP (ref 70–99)
HBV DNA # SERPL NAA+PROBE: SIGNIFICANT CHANGE UP
HBV DNA SERPL NAA+PROBE-LOG#: SIGNIFICANT CHANGE UP LOGIU/ML
HCT VFR BLD CALC: 28.7 % — LOW (ref 39–50)
HCT VFR BLD CALC: 28.7 % — LOW (ref 39–50)
HCV RNA SPEC NAA+PROBE-LOG IU: SIGNIFICANT CHANGE UP
HCV RNA SPEC NAA+PROBE-LOG IU: SIGNIFICANT CHANGE UP LOGIU/ML
HGB BLD-MCNC: 9.1 G/DL — LOW (ref 13–17)
HGB BLD-MCNC: 9.1 G/DL — LOW (ref 13–17)
IGG FLD-MCNC: 1046 MG/DL — SIGNIFICANT CHANGE UP (ref 610–1660)
IMM GRANULOCYTES NFR BLD AUTO: 0.3 % — SIGNIFICANT CHANGE UP (ref 0–0.9)
IMM GRANULOCYTES NFR BLD AUTO: 1.3 % — HIGH (ref 0–0.9)
INR BLD: 5.13 RATIO — CRITICAL HIGH (ref 0.85–1.18)
INR BLD: 5.73 RATIO — CRITICAL HIGH (ref 0.85–1.18)
INR BLD: 6.3 RATIO — CRITICAL HIGH (ref 0.85–1.18)
LDH SERPL L TO P-CCNC: 3238 U/L — HIGH (ref 50–242)
LYMPHOCYTES # BLD AUTO: 0.15 K/UL — LOW (ref 1–3.3)
LYMPHOCYTES # BLD AUTO: 0.2 K/UL — LOW (ref 1–3.3)
LYMPHOCYTES # BLD AUTO: 3.7 % — LOW (ref 13–44)
LYMPHOCYTES # BLD AUTO: 4.2 % — LOW (ref 13–44)
MAGNESIUM SERPL-MCNC: 1.8 MG/DL — SIGNIFICANT CHANGE UP (ref 1.6–2.6)
MAGNESIUM SERPL-MCNC: 2.6 MG/DL — SIGNIFICANT CHANGE UP (ref 1.6–2.6)
MCHC RBC-ENTMCNC: 23.5 PG — LOW (ref 27–34)
MCHC RBC-ENTMCNC: 23.9 PG — LOW (ref 27–34)
MCHC RBC-ENTMCNC: 31.7 GM/DL — LOW (ref 32–36)
MCHC RBC-ENTMCNC: 31.7 GM/DL — LOW (ref 32–36)
MCV RBC AUTO: 74 FL — LOW (ref 80–100)
MCV RBC AUTO: 75.3 FL — LOW (ref 80–100)
MELD SCORE WITH DIALYSIS: >40 POINTS — SIGNIFICANT CHANGE UP
MELD SCORE WITHOUT DIALYSIS: 35 POINTS — SIGNIFICANT CHANGE UP
MITOCHONDRIA AB SER-ACNC: SIGNIFICANT CHANGE UP
MITOCHONDRIA AB SER-ACNC: SIGNIFICANT CHANGE UP
MONOCYTES # BLD AUTO: 0.19 K/UL — SIGNIFICANT CHANGE UP (ref 0–0.9)
MONOCYTES # BLD AUTO: 0.2 K/UL — SIGNIFICANT CHANGE UP (ref 0–0.9)
MONOCYTES NFR BLD AUTO: 3.7 % — SIGNIFICANT CHANGE UP (ref 2–14)
MONOCYTES NFR BLD AUTO: 5.3 % — SIGNIFICANT CHANGE UP (ref 2–14)
NEUTROPHILS # BLD AUTO: 3.24 K/UL — SIGNIFICANT CHANGE UP (ref 1.8–7.4)
NEUTROPHILS # BLD AUTO: 5 K/UL — SIGNIFICANT CHANGE UP (ref 1.8–7.4)
NEUTROPHILS NFR BLD AUTO: 90.2 % — HIGH (ref 43–77)
NEUTROPHILS NFR BLD AUTO: 91.3 % — HIGH (ref 43–77)
NRBC # BLD: 0 /100 WBCS — SIGNIFICANT CHANGE UP (ref 0–0)
NRBC # BLD: 0 /100 WBCS — SIGNIFICANT CHANGE UP (ref 0–0)
PHOSPHATE SERPL-MCNC: 1.8 MG/DL — LOW (ref 2.5–4.5)
PHOSPHATE SERPL-MCNC: 3.5 MG/DL — SIGNIFICANT CHANGE UP (ref 2.5–4.5)
PLATELET # BLD AUTO: 31 K/UL — LOW (ref 150–400)
PLATELET # BLD AUTO: 41 K/UL — LOW (ref 150–400)
POTASSIUM SERPL-MCNC: 3.5 MMOL/L — SIGNIFICANT CHANGE UP (ref 3.5–5.3)
POTASSIUM SERPL-MCNC: 3.5 MMOL/L — SIGNIFICANT CHANGE UP (ref 3.5–5.3)
POTASSIUM SERPL-MCNC: 3.6 MMOL/L — SIGNIFICANT CHANGE UP (ref 3.5–5.3)
POTASSIUM SERPL-SCNC: 3.5 MMOL/L — SIGNIFICANT CHANGE UP (ref 3.5–5.3)
POTASSIUM SERPL-SCNC: 3.5 MMOL/L — SIGNIFICANT CHANGE UP (ref 3.5–5.3)
POTASSIUM SERPL-SCNC: 3.6 MMOL/L — SIGNIFICANT CHANGE UP (ref 3.5–5.3)
PROT SERPL-MCNC: 5.1 G/DL — LOW (ref 6–8.3)
PROT SERPL-MCNC: 5.1 G/DL — LOW (ref 6–8.3)
PROT SERPL-MCNC: 5.3 G/DL — LOW (ref 6–8.3)
PROTHROM AB SERPL-ACNC: 51.4 SEC — HIGH (ref 9.5–13)
PROTHROM AB SERPL-ACNC: 59.9 SEC — HIGH (ref 9.5–13)
PROTHROM AB SERPL-ACNC: 65.7 SEC — HIGH (ref 9.5–13)
RAPIDTEG MAXIMUM AMPLITUDE: <40 MM — LOW (ref 52–70)
RBC # BLD: 3.81 M/UL — LOW (ref 4.2–5.8)
RBC # BLD: 3.88 M/UL — LOW (ref 4.2–5.8)
RBC # FLD: 20 % — HIGH (ref 10.3–14.5)
RBC # FLD: 20.1 % — HIGH (ref 10.3–14.5)
SMOOTH MUSCLE AB SER-ACNC: SIGNIFICANT CHANGE UP
SODIUM SERPL-SCNC: 139 MMOL/L — SIGNIFICANT CHANGE UP (ref 135–145)
SODIUM SERPL-SCNC: 140 MMOL/L — SIGNIFICANT CHANGE UP (ref 135–145)
SODIUM SERPL-SCNC: 140 MMOL/L — SIGNIFICANT CHANGE UP (ref 135–145)
SODIUM SERPL-SCNC: 141 MMOL/L — SIGNIFICANT CHANGE UP (ref 135–145)
SODIUM UR-SCNC: 65 MMOL/L — SIGNIFICANT CHANGE UP
TEG FUNCTIONAL FIBRINOGEN: 4.4 MM — LOW (ref 15–32)
TEG LY30 (LYSIS): 0 % — SIGNIFICANT CHANGE UP (ref 0–2.6)
TEG REACTION TIME: 8.3 MIN — SIGNIFICANT CHANGE UP (ref 4.6–9.1)
WBC # BLD: 3.59 K/UL — LOW (ref 3.8–10.5)
WBC # BLD: 5.47 K/UL — SIGNIFICANT CHANGE UP (ref 3.8–10.5)
WBC # FLD AUTO: 3.59 K/UL — LOW (ref 3.8–10.5)
WBC # FLD AUTO: 5.47 K/UL — SIGNIFICANT CHANGE UP (ref 3.8–10.5)

## 2024-09-16 PROCEDURE — 99291 CRITICAL CARE FIRST HOUR: CPT | Mod: GC

## 2024-09-16 PROCEDURE — 99233 SBSQ HOSP IP/OBS HIGH 50: CPT | Mod: GC

## 2024-09-16 RX ORDER — SODIUM CHLORIDE IRRIG SOLUTION 0.9 %
1000 SOLUTION, IRRIGATION IRRIGATION
Refills: 0 | Status: DISCONTINUED | OUTPATIENT
Start: 2024-09-16 | End: 2024-09-18

## 2024-09-16 RX ORDER — ACETYLCYSTEINE
12 POWDER (GRAM) MISCELLANEOUS EVERY 24 HOURS
Refills: 0 | Status: DISCONTINUED | OUTPATIENT
Start: 2024-09-16 | End: 2024-09-18

## 2024-09-16 RX ORDER — CEFTRIAXONE SODIUM 1 G
1000 VIAL (EA) INJECTION EVERY 24 HOURS
Refills: 0 | Status: DISCONTINUED | OUTPATIENT
Start: 2024-09-16 | End: 2024-09-21

## 2024-09-16 RX ORDER — MAGNESIUM SULFATE 500 MG/ML
2 VIAL (ML) INJECTION ONCE
Refills: 0 | Status: COMPLETED | OUTPATIENT
Start: 2024-09-16 | End: 2024-09-16

## 2024-09-16 RX ORDER — POTASSIUM PHOSPHATE, MONOBASIC POTASSIUM PHOSPHATE, DIBASIC 224; 236 MG/ML; MG/ML
30 INJECTION, SOLUTION, CONCENTRATE INTRAVENOUS ONCE
Refills: 0 | Status: COMPLETED | OUTPATIENT
Start: 2024-09-16 | End: 2024-09-16

## 2024-09-16 RX ADMIN — Medication 25 GRAM(S): at 02:06

## 2024-09-16 RX ADMIN — METHYLPREDNISOLONE ACETATE 80 MILLIGRAM(S): 80 INJECTION, SUSPENSION INTRA-ARTICULAR; INTRALESIONAL; INTRAMUSCULAR; SOFT TISSUE at 05:21

## 2024-09-16 RX ADMIN — Medication 100 MILLIEQUIVALENT(S): at 02:06

## 2024-09-16 RX ADMIN — Medication 100 MILLIEQUIVALENT(S): at 03:08

## 2024-09-16 RX ADMIN — LACTULOSE 200 GRAM(S): 10 SOLUTION ORAL; RECTAL at 21:45

## 2024-09-16 RX ADMIN — LACTULOSE 200 GRAM(S): 10 SOLUTION ORAL; RECTAL at 05:21

## 2024-09-16 RX ADMIN — FOLIC ACID 1 MILLIGRAM(S): 1 TABLET ORAL at 12:37

## 2024-09-16 RX ADMIN — Medication 44.17 GRAM(S): at 04:08

## 2024-09-16 RX ADMIN — POTASSIUM PHOSPHATE, MONOBASIC POTASSIUM PHOSPHATE, DIBASIC 83.33 MILLIMOLE(S): 224; 236 INJECTION, SOLUTION, CONCENTRATE INTRAVENOUS at 17:12

## 2024-09-16 RX ADMIN — PANTOPRAZOLE SODIUM 40 MILLIGRAM(S): 40 TABLET, DELAYED RELEASE ORAL at 05:21

## 2024-09-16 RX ADMIN — THIAMINE HYDROCHLORIDE 105 MILLIGRAM(S): 100 INJECTION, SOLUTION INTRAMUSCULAR; INTRAVENOUS at 12:38

## 2024-09-16 RX ADMIN — LACTULOSE 200 GRAM(S): 10 SOLUTION ORAL; RECTAL at 13:40

## 2024-09-16 RX ADMIN — Medication 50 MILLILITER(S): at 19:00

## 2024-09-16 RX ADMIN — PANTOPRAZOLE SODIUM 40 MILLIGRAM(S): 40 TABLET, DELAYED RELEASE ORAL at 17:00

## 2024-09-16 RX ADMIN — Medication 100 MILLIGRAM(S): at 11:52

## 2024-09-16 RX ADMIN — Medication 50 MILLILITER(S): at 11:43

## 2024-09-16 RX ADMIN — Medication 50 MILLILITER(S): at 02:05

## 2024-09-16 RX ADMIN — Medication 102 MILLIGRAM(S): at 11:42

## 2024-09-16 RX ADMIN — Medication 50 MILLILITER(S): at 05:20

## 2024-09-16 NOTE — OCCUPATIONAL THERAPY INITIAL EVALUATION ADULT - PERTINENT HX OF CURRENT PROBLEM, REHAB EVAL
60 y/o male with pmh of ETOH abuse, and gastritis who initially presented to OSH due to concern of abdominal pain. Patient underwent an evaluation for acute cholecystitis and was treated for ETOH withdrawal. Patient presented with elevated LFTs that started to uptrend and patient has now been transferred to [facility] for further evaluation by the Hepatology team. Patient is currently altered and is unable to provide much collateral. Patient was treated with NAC at OSH.  CT abdomen/pelvis-*  Nondistended gallbladder with diffuse nonspecific wall edema. Correlate for acute cholecystitis. *  Severe fatty liver.

## 2024-09-16 NOTE — PHYSICAL THERAPY INITIAL EVALUATION ADULT - ADDITIONAL COMMENTS
per care coordination assessment as pt is lethargic. Pt lives in a private home with wife and children, there are 5 steps to enter. Pt performed ADL/IADLs independently. Ambulates with no assistive device.

## 2024-09-16 NOTE — OCCUPATIONAL THERAPY INITIAL EVALUATION ADULT - GENERAL OBSERVATIONS, REHAB EVAL
Patient received semi-supine in bed, +tele, BP cuff, pulse ox, +IV, catheter, sequentials donned, B/L wrist restraints Star Wedge Flap Text: The defect edges were debeveled with a #15 scalpel blade.  Given the location of the defect, shape of the defect and the proximity to free margins a star wedge flap was deemed most appropriate.  Using a sterile surgical marker, an appropriate rotation flap was drawn incorporating the defect and placing the expected incisions within the relaxed skin tension lines where possible. The area thus outlined was incised deep to adipose tissue with a #15 scalpel blade.  The skin margins were undermined to an appropriate distance in all directions utilizing iris scissors.

## 2024-09-16 NOTE — PROGRESS NOTE ADULT - PROBLEM SELECTOR PLAN 1
Dr. Light: 59-year-old male history of EtOH use, gastritis, transferred from [place] for fulminant hepatic failure due to repeated supratherapeutic ingestion (RSTI) of acetaminophen in the setting of alcoholic hepatitis.  Patient has been on N-acetylcysteine since the transfer.  Patient was also thrombocytopenic requiring transfusions of platelets and cryoprecipitate.  On initial visit patient had a detectable acetaminophen level of 42.  Patient seen at bedside, continues to have depressed mental status, elevated transaminases, and likely DIC.      On physical exam patient  has a depressed mental status, withdraws to painful stimuli, abrasions noted on the top of his head, pupils 4 mm and reactive bilaterally, bilateral rails, moving rhythm bilateral upper extremities, abdomen     Pt with likely RSTI of APAP with drug-induced liver injury on top of alcoholic hepatitis.  Continue until normalization of liver function.  Appreciate Hepatology consultation.  Would also consider CT head given depressed mental status, thrombocytopenia and history of EtOH use.

## 2024-09-16 NOTE — OCCUPATIONAL THERAPY INITIAL EVALUATION ADULT - MD ORDER
OT evaluate and treat  Increase as tolerated OT evaluate and treat  Increase as tolerated  OT functional eval completed

## 2024-09-16 NOTE — OCCUPATIONAL THERAPY INITIAL EVALUATION ADULT - RANGE OF MOTION EXAMINATION, UPPER EXTREMITY
bilateral UE Passive ROM was WNL (within normal limits) bilateral UE Active Assistive ROM was WFL  (within functional limits)

## 2024-09-16 NOTE — PHYSICAL THERAPY INITIAL EVALUATION ADULT - GENERAL OBSERVATIONS, REHAB EVAL
Pt received semi supine in bed +ICU monitoring lines, +bl wrist restraints, +saxena, +IV, ok for PT per [facility] [place].

## 2024-09-16 NOTE — OCCUPATIONAL THERAPY INITIAL EVALUATION ADULT - ADL RETRAINING, OT EVAL
Patient will dress upper body (I) in 4 weeks Patient will dress upper body (I) in 4 weeks. Patient will dress lower body (I) AE as needed in 4 weeks

## 2024-09-16 NOTE — PROGRESS NOTE ADULT - SUBJECTIVE AND OBJECTIVE BOX
MEDICAL TOXICOLOGY CONSULT    HPI:  Patient is a 60 y/o male with pmh of ETOH abuse, and gastritis who initially presented to  for abdominal pain. Appears he went to the ED on  and refused labs for abd pain, received 975mg acetaminophen, was discharged. Then on  at home he took a total of 10-12mg of acetaminophen for pain at home and drink more than usual (vodka and a small bottle of wine).  he represented to the ED with abdominal pain, found to have elevated LFTs and APAP level of 42. Received 1g of acetaminophen in the ED. During his stay had worsening altered mental status, elevated transaminitis to 17,000 and DIC. He has been given platelets, cryo and NAC. Was transferred to [facility] for transplant hepatology consult.   -Today he continues to be nonverbal, minimally withdrawals to painful stimuli, obtained history stated above from phone call with sister and mother.      PAST MEDICAL & SURGICAL HISTORY:  PUD (peptic ulcer disease)      EtOH dependence      Gastritis      Elevated lipase      No significant past surgical history          MEDICATION HISTORY:  acetylcysteine IVPB 12 Gram(s) IV Intermittent every 24 hours  cefTRIAXone   IVPB 1000 milliGRAM(s) IV Intermittent every 24 hours  dextrose 5% + lactated ringers. 1000 milliLiter(s) IV Continuous <Continuous>  folic acid Injectable 1 milliGRAM(s) IV Push daily  lactulose Retention Enema 200 Gram(s) Rectal every 8 hours  pantoprazole  Injectable 40 milliGRAM(s) IV Push two times a day  phytonadione  IVPB 10 milliGRAM(s) IV Intermittent daily  thiamine IVPB 500 milliGRAM(s) IV Intermittent daily      FAMILY HISTORY:  FH: HTN (hypertension)        PHYSICAL EXAM  Vital Signs Last 24 Hrs  T(C): 36.9 (16 Sep 2024 16:00), Max: 37.7 (15 Sep 2024 20:00)  T(F): 98.4 (16 Sep 2024 16:00), Max: 99.9 (15 Sep 2024 20:00)  HR: 109 (16 Sep 2024 16:00) (99 - 124)  BP: 131/76 (16 Sep 2024 16:00) (119/75 - 173/78)  BP(mean): 98 (16 Sep 2024 16:00) (83 - 123)  RR: 14 (16 Sep 2024 16:00) (12 - 29)  SpO2: 96% (16 Sep 2024 16:00) (95% - 100%)    Parameters below as of 16 Sep 2024 14:12  Patient On (Oxygen Delivery Method): room air    Physical Exam:  General: groans to painful stimuli  Skin: abrasion to the L scalp, ecchymosis to the R arm, bilateral thighs  Eyes: dry, 4mm and reactive bilaterally  Lungs: slight tachypnea, crackles ilaterally  Heart: edema of the bilateral upper ex  Abdomen: mildly distended, groans to palpation  Extremities: mild edema of the ex  Neurologic: moves all 4 ex, no posturing        SIGNIFICANT LABORATORY STUDIES:                        9.1    3.59  )-----------( 41       ( 16 Sep 2024 12:09 )             28.7           140  |  102  |  16  ----------------------------<  203<H>  3.5   |  21<L>  |  1.24    Ca    7.8<L>      16 Sep 2024 12:09  Phos  1.8       Mg     2.6         TPro  5.1<L>  /  Alb  2.7<L>  /  TBili  7.2<H>  /  DBili  4.0<H>  /  AST  5905<H>  /  ALT  3601<H>  /  AlkPhos  104        PT/INR - ( 16 Sep 2024 12:09 )   PT: 51.4 sec;   INR: 5.13 ratio         PTT - ( 16 Sep 2024 12:09 )  PTT:34.1 sec    Urinalysis Basic - ( 16 Sep 2024 12:09 )    Color: x / Appearance: x / SG: x / pH: x  Gluc: 203 mg/dL / Ketone: x  / Bili: x / Urobili: x   Blood: x / Protein: x / Nitrite: x   Leuk Esterase: x / RBC: x / WBC x   Sq Epi: x / Non Sq Epi: x / Bacteria: x        Anion Gap: 17  @ 12:09  CK: --  @ 12:09  Troponin:  --   @ 12:09  Pro-BNP:  --   @ 12:09  VBG:  --   @ 12:09  Carboxyhemoglobin %:  --   @ 12:09  Methemoglobin %:  --   @ 12:09  Osmolality Serum:  --   @ 12:09  Aspirin Level: --   @ 12:09  Acetaminophen Level:  <15   @ 12:09  Ethanol Level:  --   @ 12:09  Digoxin Level:  --   @ 12:09  Phenytoin Level:  --   @ 12:09  Carbamazepine level:  --   @ 12:09  Lamotrigine level:  --   @ 12:09  Anion Gap: 18<H>  @ 00:21  CK: --  @ 00:21  Troponin:  --   @ 00:21  Pro-BNP:  --   @ 00:21  VBG:  --   00:21  Carboxyhemoglobin %:  --   @ 00:21  Methemoglobin %:  --   @ 00:21  Osmolality Serum:  --   @ 00:21  Aspirin Level: --   @ 00:21  Acetaminophen Level:  --   @ 00:21  Ethanol Level:  --   @ 00:21  Digoxin Level:  --   @ 00:21  Phenytoin Level:  --   @ 00:21  Carbamazepine level:  --   @ 00:21  Lamotrigine level:  --   @ 00:21  Anion Gap: --  @ 00:13  CK: --  00:13  Troponin:  --   00:13  Pro-BNP:  --   00:13  VB   00:13     MEDICAL TOXICOLOGY CONSULT    HPI:  Patient is a 58 y/o male with pmh of ETOH abuse, and gastritis who initially presented to [facility] for abdominal pain. Appears he went to the ED on  and refused labs for abd pain, received 975mg acetaminophen, was discharged. Then on  at home he took a total of 10-12x 500mg of acetaminophen for pain at home and drink more than usual (vodka and a small bottle of wine).  he represented to the ED with abdominal pain, found to have elevated LFTs and APAP level of 42. Received 1g of acetaminophen in the ED. During his stay had worsening altered mental status, elevated transaminitis to 17,000 and DIC. He has been given platelets, cryo and NAC. Was transferred to [facility] for transplant hepatology consult.   -Today he continues to be nonverbal, minimally withdrawals to painful stimuli, obtained history stated above from phone call with sister and mother.      PAST MEDICAL & SURGICAL HISTORY:  PUD (peptic ulcer disease)      EtOH dependence      Gastritis      Elevated lipase      No significant past surgical history          MEDICATION HISTORY:  acetylcysteine IVPB 12 Gram(s) IV Intermittent every 24 hours  cefTRIAXone   IVPB 1000 milliGRAM(s) IV Intermittent every 24 hours  dextrose 5% + lactated ringers. 1000 milliLiter(s) IV Continuous <Continuous>  folic acid Injectable 1 milliGRAM(s) IV Push daily  lactulose Retention Enema 200 Gram(s) Rectal every 8 hours  pantoprazole  Injectable 40 milliGRAM(s) IV Push two times a day  phytonadione  IVPB 10 milliGRAM(s) IV Intermittent daily  thiamine IVPB 500 milliGRAM(s) IV Intermittent daily      FAMILY HISTORY:  FH: HTN (hypertension)        PHYSICAL EXAM  Vital Signs Last 24 Hrs  T(C): 36.9 (16 Sep 2024 16:00), Max: 37.7 (15 Sep 2024 20:00)  T(F): 98.4 (16 Sep 2024 16:00), Max: 99.9 (15 Sep 2024 20:00)  HR: 109 (16 Sep 2024 16:00) (99 - 124)  BP: 131/76 (16 Sep 2024 16:00) (119/75 - 173/78)  BP(mean): 98 (16 Sep 2024 16:00) (83 - 123)  RR: 14 (16 Sep 2024 16:00) (12 - 29)  SpO2: 96% (16 Sep 2024 16:00) (95% - 100%)    Parameters below as of 16 Sep 2024 14:12  Patient On (Oxygen Delivery Method): room air    Physical Exam:  General: groans to painful stimuli  Skin: abrasion to the L scalp, ecchymosis to the R arm, bilateral thighs  Eyes: dry, 4mm and reactive bilaterally  Lungs: slight tachypnea, crackles ilaterally  Heart: edema of the bilateral upper ex  Abdomen: mildly distended, groans to palpation  Extremities: mild edema of the ex  Neurologic: moves all 4 ex, no posturing        SIGNIFICANT LABORATORY STUDIES:                        9.1    3.59  )-----------( 41       ( 16 Sep 2024 12:09 )             28.7           140  |  102  |  16  ----------------------------<  203<H>  3.5   |  21<L>  |  1.24    Ca    7.8<L>      16 Sep 2024 12:09  Phos  1.8       Mg     2.6         TPro  5.1<L>  /  Alb  2.7<L>  /  TBili  7.2<H>  /  DBili  4.0<H>  /  AST  5905<H>  /  ALT  3601<H>  /  AlkPhos  104        PT/INR - ( 16 Sep 2024 12:09 )   PT: 51.4 sec;   INR: 5.13 ratio         PTT - ( 16 Sep 2024 12:09 )  PTT:34.1 sec    Urinalysis Basic - ( 16 Sep 2024 12:09 )    Color: x / Appearance: x / SG: x / pH: x  Gluc: 203 mg/dL / Ketone: x  / Bili: x / Urobili: x   Blood: x / Protein: x / Nitrite: x   Leuk Esterase: x / RBC: x / WBC x   Sq Epi: x / Non Sq Epi: x / Bacteria: x        Anion Gap: 17  @ 12:09  CK: --  @ 12:09  Troponin:  --   @ 12:09  Pro-BNP:  --   @ 12:09  VBG:  --   @ 12:09  Carboxyhemoglobin %:  --  09-16 @ 12:09  Methemoglobin %:  --   @ 12:09  Osmolality Serum:  --   @ 12:09  Aspirin Level: --   @ 12:09  Acetaminophen Level:  <15   @ 12:09  Ethanol Level:  --   @ 12:09  Digoxin Level:  --   @ 12:09  Phenytoin Level:  --   @ 12:09  Carbamazepine level:  --   @ 12:09  Lamotrigine level:  --   @ 12:09  Anion Gap: 18<H>  @ 00:21  CK: --  @ 00:21  Troponin:  --   @ 00:21  Pro-BNP:  --   @ 00:21  VBG:  --   @ 00:21  Carboxyhemoglobin %:  --   @ 00:21  Methemoglobin %:  --   @ 00:21  Osmolality Serum:  --   @ 00:21  Aspirin Level: --   @ 00:21  Acetaminophen Level:  --   @ 00:21  Ethanol Level:  --   @ 00:21  Digoxin Level:  --   @ 00:21  Phenytoin Level:  --   @ 00:21  Carbamazepine level:  --   @ 00:21  Lamotrigine level:  --   @ 00:21  Anion Gap: --  00:13  CK: --  00:13  Troponin:  --   00:13  Pro-BNP:  --   @ 00:13  VB   00:13

## 2024-09-16 NOTE — OCCUPATIONAL THERAPY INITIAL EVALUATION ADULT - PLANNED THERAPY INTERVENTIONS, OT EVAL
cognitive, visual perceptual ADL retraining/balance training/bed mobility training/cognitive, visual perceptual/transfer training ADL retraining/balance training/bed mobility training/cognitive, visual perceptual/ROM/strengthening/transfer training

## 2024-09-16 NOTE — PROGRESS NOTE ADULT - ATTENDING COMMENTS
Patient is a 59 year old M with PMHx ETOH abuse with frequent admissions for withdrawal in the past initially presents to OSH for ETOH withdrawal, Patient with transaminitis, initially mild. Possible episode of hypotension with LFT--> 17K, elevated ammonia, poor mental status.     Patient transferred to [facility] for liver failure and possible transplant evaluation. Reported elevated tylenol level started on NAC. LFTs down trending but will with very high meld, as well as coagulopathy.     # ETOH abuse  # Acute liver failure  # Hepatic encephalopathy  # Coagulopathy  - Acute liver failure, most likely 2/2 to ischemic vs etoh induced.   - C/W nac, f/u with work up per heme. repeat tylenol level  - Trend MELD, LFTs  - Likely not a candidate for Liver transplant  - Unable to pass NGT on multiple passes. C/W rectal lactulose  - Still with persistent encephalopathy, non-focal. Aim for increased BM. If no improvement may need CTH  - Currently on room air, protective airway, continue to monitor  - TEG showing low amplitiue, had low fibrinogent. S/P Cryo, will trend coags and replete as needed. No active bleeding  - DVT ppx- SCD 2/2 to elevated INR  - Dispo- full code.

## 2024-09-16 NOTE — OCCUPATIONAL THERAPY INITIAL EVALUATION ADULT - COGNITIVE, VISUAL PERCEPTUAL, OT EVAL
Pt will follow 50% simple directions within 4 weeks. Pt will be A+O x 2 (self and place) within 4 weeks. Pt will follow 100% simple directions within 4 weeks. Pt will be A+O x 4 (self and place) within 4 weeks.

## 2024-09-16 NOTE — PROGRESS NOTE ADULT - SUBJECTIVE AND OBJECTIVE BOX
Gastroenterology/Hepatology Progress Note      Interval Events:   - Tried to place NGT but difficult due to bleeding from low platelets.   - Patient still remains confused, difficult to arose with verbal stimuli, with tactile stimuli he moves his arms. Protecting his airway at this time.   - Had 4 bowel movements with lactulose enemas.   - No fevers or chills.   - Currently on NAC drip.       Allergies:  No Known Allergies      Hospital Medications:  acetylcysteine IVPB 12 Gram(s) IV Intermittent every 24 hours  cefTRIAXone   IVPB 1000 milliGRAM(s) IV Intermittent every 24 hours  dextrose 5% + lactated ringers. 1000 milliLiter(s) IV Continuous <Continuous>  folic acid Injectable 1 milliGRAM(s) IV Push daily  lactulose Retention Enema 200 Gram(s) Rectal every 8 hours  LORazepam   Injectable 1 milliGRAM(s) IV Push every 1 hour PRN  LORazepam   Injectable 1 milliGRAM(s) IV Push every 2 hours PRN  methylPREDNISolone sodium succinate Injectable 80 milliGRAM(s) IV Push daily  pantoprazole  Injectable 40 milliGRAM(s) IV Push two times a day  phytonadione  IVPB 10 milliGRAM(s) IV Intermittent daily  thiamine IVPB 500 milliGRAM(s) IV Intermittent daily      ROS: 14 point ROS negative unless otherwise state in subjective    PHYSICAL EXAM:   Vital Signs:  Vital Signs Last 24 Hrs  T(C): 36.9 (16 Sep 2024 08:00), Max: 37.7 (15 Sep 2024 20:00)  T(F): 98.4 (16 Sep 2024 08:00), Max: 99.9 (15 Sep 2024 20:00)  HR: 101 (16 Sep 2024 09:00) (99 - 124)  BP: 128/79 (16 Sep 2024 09:00) (128/79 - 210/89)  BP(mean): 99 (16 Sep 2024 09:00) (83 - 128)  RR: 12 (16 Sep 2024 09:00) (12 - 29)  SpO2: 99% (16 Sep 2024 09:00) (96% - 100%)    Parameters below as of 16 Sep 2024 08:00  Patient On (Oxygen Delivery Method): room air      Daily     Daily Weight in k.5 (16 Sep 2024 00:00)    PHYSICAL EXAM:     GENERAL:  Lethargic  HEENT:  NCAT, no scleral icterus   CHEST:  no respiratory distress  HEART:  Regular rate and rhythm  ABDOMEN:  Soft, non-tender, moderately distended,   EXTREMITIES: No edema  SKIN:  No rash/erythema/ecchymoses/petechiae/wounds/abscess/warm/dry  NEURO:  Lethargic, not responding to verbal stimuli, moves his extremities with tactile stimuli.     LABS:                        9.1    5.47  )-----------( 31       ( 16 Sep 2024 00:21 )             28.7     Mean Cell Volume: 74.0 fl (24 @ 00:21)        141  |  x   |  x   ----------------------------<  x   x    |  x   |  1.22    Ca    7.9<L>      16 Sep 2024 00:21  Phos  3.5       Mg     1.8         TPro  x   /  Alb  x   /  TBili  6.8<H>  /  DBili  x   /  AST  x   /  ALT  x   /  AlkPhos  x       LIVER FUNCTIONS - ( 16 Sep 2024 00:21 )  Alb: 2.9 g/dL / Pro: 5.3 g/dL / ALK PHOS: 113 U/L / ALT: 4456 U/L / AST: 9219 U/L / GGT: x           PT/INR - ( 16 Sep 2024 07:28 )   PT: 59.9 sec;   INR: 5.73 ratio         PTT - ( 16 Sep 2024 00:21 )  PTT:36.0 sec  Urinalysis Basic - ( 16 Sep 2024 00:21 )    Color: x / Appearance: x / SG: x / pH: x  Gluc: 88 mg/dL / Ketone: x  / Bili: x / Urobili: x   Blood: x / Protein: x / Nitrite: x   Leuk Esterase: x / RBC: x / WBC x   Sq Epi: x / Non Sq Epi: x / Bacteria: x      Amylase Serum--      Lipase serum--       [id number]        Imaging:           Gastroenterology/Hepatology Progress Note      Interval Events:   - Tried to place NGT but difficult due to bleeding from low platelets.   - Patient still remains confused, difficult to arose with verbal stimuli, with tactile stimuli he moves his arms. Protecting his airway at this time.   - Had 4 bowel movements with lactulose enemas.   - No fevers or chills.   - Currently on NAC drip.     Allergies:  No Known Allergies      Hospital Medications:  acetylcysteine IVPB 12 Gram(s) IV Intermittent every 24 hours  cefTRIAXone   IVPB 1000 milliGRAM(s) IV Intermittent every 24 hours  dextrose 5% + lactated ringers. 1000 milliLiter(s) IV Continuous <Continuous>  folic acid Injectable 1 milliGRAM(s) IV Push daily  lactulose Retention Enema 200 Gram(s) Rectal every 8 hours  LORazepam   Injectable 1 milliGRAM(s) IV Push every 1 hour PRN  LORazepam   Injectable 1 milliGRAM(s) IV Push every 2 hours PRN  methylPREDNISolone sodium succinate Injectable 80 milliGRAM(s) IV Push daily  pantoprazole  Injectable 40 milliGRAM(s) IV Push two times a day  phytonadione  IVPB 10 milliGRAM(s) IV Intermittent daily  thiamine IVPB 500 milliGRAM(s) IV Intermittent daily      ROS: 14 point ROS negative unless otherwise state in subjective    PHYSICAL EXAM:   Vital Signs:  Vital Signs Last 24 Hrs  T(C): 36.9 (16 Sep 2024 08:00), Max: 37.7 (15 Sep 2024 20:00)  T(F): 98.4 (16 Sep 2024 08:00), Max: 99.9 (15 Sep 2024 20:00)  HR: 101 (16 Sep 2024 09:00) (99 - 124)  BP: 128/79 (16 Sep 2024 09:00) (128/79 - 210/89)  BP(mean): 99 (16 Sep 2024 09:00) (83 - 128)  RR: 12 (16 Sep 2024 09:00) (12 - 29)  SpO2: 99% (16 Sep 2024 09:00) (96% - 100%)    Parameters below as of 16 Sep 2024 08:00  Patient On (Oxygen Delivery Method): room air      Daily     Daily Weight in k.5 (16 Sep 2024 00:00)    PHYSICAL EXAM:     GENERAL:  Lethargic  HEENT:  NCAT, no scleral icterus   CHEST:  no respiratory distress  HEART:  Regular rate and rhythm  ABDOMEN:  Soft, non-tender, moderately distended,   EXTREMITIES: No edema  SKIN:  No rash/erythema/ecchymoses/petechiae/wounds/abscess/warm/dry  NEURO:  Lethargic, not responding to verbal stimuli, moves his extremities with tactile stimuli.     LABS:                        9.1    5.47  )-----------( 31       ( 16 Sep 2024 00:21 )             28.7     Mean Cell Volume: 74.0 fl (24 @ 00:21)        141  |  x   |  x   ----------------------------<  x   x    |  x   |  1.22    Ca    7.9<L>      16 Sep 2024 00:21  Phos  3.5       Mg     1.8         TPro  x   /  Alb  x   /  TBili  6.8<H>  /  DBili  x   /  AST  x   /  ALT  x   /  AlkPhos  x       LIVER FUNCTIONS - ( 16 Sep 2024 00:21 )  Alb: 2.9 g/dL / Pro: 5.3 g/dL / ALK PHOS: 113 U/L / ALT: 4456 U/L / AST: 9219 U/L / GGT: x           PT/INR - ( 16 Sep 2024 07:28 )   PT: 59.9 sec;   INR: 5.73 ratio         PTT - ( 16 Sep 2024 00:21 )  PTT:36.0 sec  Urinalysis Basic - ( 16 Sep 2024 00:21 )    Color: x / Appearance: x / SG: x / pH: x  Gluc: 88 mg/dL / Ketone: x  / Bili: x / Urobili: x   Blood: x / Protein: x / Nitrite: x   Leuk Esterase: x / RBC: x / WBC x   Sq Epi: x / Non Sq Epi: x / Bacteria: x      Amylase Serum--      Lipase serum--       [id number]        Imaging:

## 2024-09-16 NOTE — PROGRESS NOTE ADULT - SUBJECTIVE AND OBJECTIVE BOX
INTERVAL HPI/OVERNIGHT EVENTS: Patient started back on NAC overnight.    SUBJECTIVE: Patient seen and examined at bedside. Remains altered.       VITAL SIGNS:  ICU Vital Signs Last 24 Hrs  T(C): 37.3 (16 Sep 2024 04:00), Max: 37.7 (15 Sep 2024 20:00)  T(F): 99.1 (16 Sep 2024 04:00), Max: 99.9 (15 Sep 2024 20:00)  HR: 110 (16 Sep 2024 06:00) (99 - 124)  BP: 151/83 (16 Sep 2024 06:00) (132/60 - 210/89)  BP(mean): 111 (16 Sep 2024 06:00) (87 - 128)  ABP: --  ABP(mean): --  RR: 13 (16 Sep 2024 06:00) (13 - 29)  SpO2: 100% (16 Sep 2024 06:00) (96% - 100%)    O2 Parameters below as of 16 Sep 2024 04:00  Patient On (Oxygen Delivery Method): nasal cannula  O2 Flow (L/min): 2          Plateau pressure:   P/F ratio:     09-14 @ 07:01  -  09-15 @ 07:00  --------------------------------------------------------  IN: 125 mL / OUT: 0 mL / NET: 125 mL    09-15 @ 07:01  -  09-16 @ 06:58  --------------------------------------------------------  IN: 2174.1 mL / OUT: 1730 mL / NET: 444.1 mL      CAPILLARY BLOOD GLUCOSE      POCT Blood Glucose.: 130 mg/dL (16 Sep 2024 06:05)    ECG:    PHYSICAL EXAM:  GENERAL: NAD  HEAD:  Atraumatic, Normocephalic  EYES: EOMI, PERRLA, conjunctiva and sclera clear  ENT: Moist mucous membranes  NECK: Supple, No elevated JVP.  CHEST/LUNG: Clear to auscultation bilaterally; No rales, rhonchi, or wheezes.   HEART: Regular rate and rhythm; No murmurs, rubs, or gallops  ABDOMEN: Bowel sounds present; Soft, nontender, nondistended  EXTREMITIES:  2+ Peripheral Pulses, brisk capillary refill. No clubbing, cyanosis, or edema  NERVOUS SYSTEM:  A&Ox0, no focal deficits   SKIN: No rashes or lesions    MEDICATIONS:  MEDICATIONS  (STANDING):  acetylcysteine IVPB 12 Gram(s) IV Intermittent every 24 hours  dextrose 5% + lactated ringers. 1000 milliLiter(s) (50 mL/Hr) IV Continuous <Continuous>  folic acid Injectable 1 milliGRAM(s) IV Push daily  lactulose Retention Enema 200 Gram(s) Rectal every 8 hours  methylPREDNISolone sodium succinate Injectable 80 milliGRAM(s) IV Push daily  pantoprazole  Injectable 40 milliGRAM(s) IV Push two times a day  phytonadione  IVPB 10 milliGRAM(s) IV Intermittent daily  thiamine IVPB 500 milliGRAM(s) IV Intermittent daily    MEDICATIONS  (PRN):      ALLERGIES:  Allergies    No Known Allergies    Intolerances        LABS:                        9.1    5.47  )-----------( 31       ( 16 Sep 2024 00:21 )             28.7     09-16    140  |  102  |  12  ----------------------------<  88  3.6   |  20<L>  |  1.20    Ca    7.9<L>      16 Sep 2024 00:21  Phos  3.5     09-16  Mg     1.8     09-16    TPro  5.3<L>  /  Alb  2.9<L>  /  TBili  6.5<H>  /  DBili  x   /  AST  9219<H>  /  ALT  4456<H>  /  AlkPhos  113  09-16    PT/INR - ( 16 Sep 2024 00:21 )   PT: 65.7 sec;   INR: 6.30 ratio         PTT - ( 16 Sep 2024 00:21 )  PTT:36.0 sec  Urinalysis Basic - ( 16 Sep 2024 00:21 )    Color: x / Appearance: x / SG: x / pH: x  Gluc: 88 mg/dL / Ketone: x  / Bili: x / Urobili: x   Blood: x / Protein: x / Nitrite: x   Leuk Esterase: x / RBC: x / WBC x   Sq Epi: x / Non Sq Epi: x / Bacteria: x        RADIOLOGY & ADDITIONAL TESTS: Reviewed.   INTERVAL HPI/OVERNIGHT EVENTS: Patient started back on NAC overnight. Started on Lactulose enema with 1 BM.    SUBJECTIVE: Patient seen and examined at bedside. Remains altered.       VITAL SIGNS:  ICU Vital Signs Last 24 Hrs  T(C): 37.3 (16 Sep 2024 04:00), Max: 37.7 (15 Sep 2024 20:00)  T(F): 99.1 (16 Sep 2024 04:00), Max: 99.9 (15 Sep 2024 20:00)  HR: 110 (16 Sep 2024 06:00) (99 - 124)  BP: 151/83 (16 Sep 2024 06:00) (132/60 - 210/89)  BP(mean): 111 (16 Sep 2024 06:00) (87 - 128)  ABP: --  ABP(mean): --  RR: 13 (16 Sep 2024 06:00) (13 - 29)  SpO2: 100% (16 Sep 2024 06:00) (96% - 100%)    O2 Parameters below as of 16 Sep 2024 04:00  Patient On (Oxygen Delivery Method): nasal cannula  O2 Flow (L/min): 2          Plateau pressure:   P/F ratio:     09-14 @ 07:01  -  09-15 @ 07:00  --------------------------------------------------------  IN: 125 mL / OUT: 0 mL / NET: 125 mL    09-15 @ 07:01  -  09-16 @ 06:58  --------------------------------------------------------  IN: 2174.1 mL / OUT: 1730 mL / NET: 444.1 mL      CAPILLARY BLOOD GLUCOSE      POCT Blood Glucose.: 130 mg/dL (16 Sep 2024 06:05)        PHYSICAL EXAM:  GENERAL: NAD  HEAD:  Atraumatic, Normocephalic  EYES: EOMI, PERRLA, conjunctiva and sclera clear  ENT: Moist mucous membranes  NECK: Supple, No elevated JVP.  CHEST/LUNG: Clear to auscultation bilaterally; No rales, rhonchi, or wheezes.   HEART: Regular rate and rhythm; No murmurs, rubs, or gallops  ABDOMEN: Bowel sounds present; Soft, nontender, nondistended  EXTREMITIES:  2+ Peripheral Pulses, brisk capillary refill. No clubbing, cyanosis, or edema  NERVOUS SYSTEM:  A&Ox0, no focal deficits   SKIN: No rashes or lesions    MEDICATIONS:  MEDICATIONS  (STANDING):  acetylcysteine IVPB 12 Gram(s) IV Intermittent every 24 hours  dextrose 5% + lactated ringers. 1000 milliLiter(s) (50 mL/Hr) IV Continuous <Continuous>  folic acid Injectable 1 milliGRAM(s) IV Push daily  lactulose Retention Enema 200 Gram(s) Rectal every 8 hours  methylPREDNISolone sodium succinate Injectable 80 milliGRAM(s) IV Push daily  pantoprazole  Injectable 40 milliGRAM(s) IV Push two times a day  phytonadione  IVPB 10 milliGRAM(s) IV Intermittent daily  thiamine IVPB 500 milliGRAM(s) IV Intermittent daily    MEDICATIONS  (PRN):      ALLERGIES:  Allergies    No Known Allergies    Intolerances        LABS:                        9.1    5.47  )-----------( 31       ( 16 Sep 2024 00:21 )             28.7     09-16    140  |  102  |  12  ----------------------------<  88  3.6   |  20<L>  |  1.20    Ca    7.9<L>      16 Sep 2024 00:21  Phos  3.5     09-16  Mg     1.8     09-16    TPro  5.3<L>  /  Alb  2.9<L>  /  TBili  6.5<H>  /  DBili  x   /  AST  9219<H>  /  ALT  4456<H>  /  AlkPhos  113  09-16    PT/INR - ( 16 Sep 2024 00:21 )   PT: 65.7 sec;   INR: 6.30 ratio         PTT - ( 16 Sep 2024 00:21 )  PTT:36.0 sec  Urinalysis Basic - ( 16 Sep 2024 00:21 )    Color: x / Appearance: x / SG: x / pH: x  Gluc: 88 mg/dL / Ketone: x  / Bili: x / Urobili: x   Blood: x / Protein: x / Nitrite: x   Leuk Esterase: x / RBC: x / WBC x   Sq Epi: x / Non Sq Epi: x / Bacteria: x        RADIOLOGY & ADDITIONAL TESTS: Reviewed.

## 2024-09-16 NOTE — PATIENT PROFILE ADULT - NSPROPTRIGHTBILLOFRIGHTS_GEN_A_NUR
09/16/24 1200   Basic Information   Entry type Admission   Date of Entry 09/16/24   Level of Care 3 - Partial Hospitalization   Primary TEJAS Diagnosis Alcohol Dependence (F10.20)   Psychosocial Information   Living Arrangements Alone   Type of Residence House   Ownership Own   Employment Status Retired   Are You Attending School? No   Sexual Orientation Straight   Relationship Status    Do You Have Children? No   Current Legal Issues 6 - None   Number of Arrests in the Last 30 Days 0   Have You been and/or are You Active in AA, NA, CA and/or Other Support Groups? No   ALCOHOL AND DRUG OUTCOME MEASURE (ADOM)   Days Alcohol Consumed in the Last Four Weeks 23   How many standard drinks did you consume on a typical drinking day? 4   Days Marijuana Used in the Last Four Weeks 2   Days Amphetamines/Stimulants Used in the Last Four Weeks 0   Days Opiods Used in the Last Four Weeks 0   Days Sedatives Tranquilizers Used in the Last Four Weeks 0   Any other drugs? Specify which drugs (max of 3) None   How many cigarettes have you smoked per day on average? 0   Primary substance of concern (select substance) Alcohol   Secondary substance of concern (select substance) Cannabis   On how many days have you injected drugs in the last 4 weeks? 0   How often has your general physical health caused problems in your daily life? 1 - Less than weekly   How often has your general mental health caused problems in your daily life? 1 - Less than weekly   How often has your alcohol or drug use led to problems with friends or family members? 1 - Less than weekly   How often has your alcohol or drug use caused problems with your work or other activities in any of the following: social, recreational, looking after children or other family members, study or other personal activities? 2 - Once or twice a week   How often have you engaged in any of the following: paid work, voluntary work, study, looking after children or other caregiving  activities? 0 - Not at all   Have you had difficulties with housing or finding somewhere stable to live?  0 - Not at all   How often have you been involved in any criminal or illegal activities such as driving a motor vehicle under the influence of alcohol or drugs, assault, shoplifting, supplying an illicit substance to another person (do not include using illegal drugs).  0 - Not at all   Overall, how close are you to where you want to be in your recovery? Select the number that best fits where you are now. (10 is the best possible) 3   How satisfied are you with your progress toward achiving your recovery goals?  3 - Considerably   [place] [facility]   Having a place to live that is supportive of my recovery 10   Taking care of myself and managing my day to day life 10   Having enough money to live on, pay my bills and meet my basic needs 10   Having a job, school, volunteering, taking care of my family, or other activities that give meaning and purpose in my life 3   Being someone other people can count on 7   Taking a positive attitude toward myself 2   Free from depression, anxiety, or strong anger 2   Doing things to help myself in my recovery 5   Being honest with myself 7   Forgiving, accepting, and respecting myself 7   Avoiding people, places, situations or thing that trigger my  use of alcohol or drugs 3   Handling negative feelings and reacting to life's ups and downs without using alcohol or drugs 3   Free from being troubled or bothered by strong urges to use alcohol or drugs 3   Enjoying life without using alcohol or drugs 2   Feeling that my life has value and worth 7   Reading Summary 81   PHQ-2/9 Depression Screening   Able to Complete PHQ2/9 Questionnaire Yes   PHQ-2/9 Depression Screening   Little interest or pleasure in activity? 2   Feeling down, depressed or hopeless? 2   Initial depression screening score: 4   PHQ2 Interpretation Further screening needed   Trouble falling or staying  asleep or sleeping all the time? 0   Feeling tired or having little energy? 1   Poor appetite or overeating? 0   Feeling bad about yourself or that you are a failure or have let yourself or family down? 1   Trouble concentrating on things such as reading the newspaper or watching TV? 0   Moving or speaking slowly that other people have noticed or the opposite - being so fidgety or restless that you have been moving around a lot more than usual? 0   Thoughts that you would be better off dead or of hurting yourself in some way? 0   Total depressive symptoms score (PHQ9):  6   PHQ9 Interpretation Mild Depression   If you reported any problems, how difficult have these problems made it to do your work, take care of things at home, or get along with other people? Somewhat difficult   Anxiety Tool GEOFFREY-7    Able to Complete GEOFFREY-7 Questionnaire Yes   Anxiety Tool GEOFFREY - 7   Feeling Nervous, Anxious, or on Edge 1 - Several days   Not Being Able to Stop or Control Worrying 1 - Several days   Worrying Too Much About Different Things 0 - Not at all   Trouble Relaxing 1 - Several days   Being So Restless it is Hard to Sit Still 1 - Several days   Becoming Easily Annoyed or Irritable 1 - Several days   Feeling Afraid as if Something Awful Might Happen 1 - Several days   Total Score 6   Gonzalez Samuel, SHARMIN  9/16/2024   patient

## 2024-09-16 NOTE — PROGRESS NOTE ADULT - ASSESSMENT
59 years old male with h/o hronic ETOH abuse and dependence (1 bottle of Vodka a day) with long standing hx of alcohol withdrawal and DTs with frequent hospital/ICU admissions, alcoholic gastritis/esophagitis, PUD, HLD, hx of hypoxic respiratory failure requiring intubation due to aspiration PNA (most recent intubation Aril 2020),  staph PNA, COVID-19 infection Dec 2020 presented with abd pain at the OSH.    #Decompensated alcoholic cirrhosis  #Acute on chronic liver failure  - Presented with AMS at OSH. Had multiple hospitalizations in the past for alcohol withdrawal and DT per records. Unclear last alcohol drink. His alcohol level this admission was <10 and Tylenol level was 42 on admission.   - In terms of liver enzymes, patient had normal liver enzymes on 8/2024 when he was admitted for withdrawal with high alcohol level. On 9/13 admission, his AST/ALT 2000/675 with bili 2.8. Now increased AST 43004/ALT 5000s, stable since yesterday. INR has been increasing from 1.25 to 7.25. Patient has received multiple blood products to decrease his INR. Concerned that he might be going into DIC. Initially was hypotensive and required pressors at the OSH. Currently not on pressor support.   - RUQ US: Distended gallbladder with wall thickening up to 5 mm and trace pericholecystic fluid without evidence of cholelithiasis or biliary dilatation. Negative sonographic Gibson's sign.   - CTAP: Nondistended gallbladder with diffuse nonspecific wall edema. Severe fatty liver.  - Could be due to Tylenol use in the setting of underlying liver disease causing acute elevation in his liver enzymes. However, could also be due to ischemia as he was hypotensive at OSH, concerned for possible sepsis. GB distended concerning for acute cholecystitis, no other sources of infection. No ascites seen on imaging.   - Other lab serologies: CMV, EBV PCR neg, Hep B/C neg.     Recommendations:   - Continue with NAC drip.   - Place NGT.   - Continue with lactulose and rifaximin, goal 3-4 BMs per day.   - Please send HBV and HCV RNA levels.   - please send alpha-1 anti-trypsin (phenotype), ceruloplasm, AFP  - please send CHANO, anti-mitochondrial antibody, anti-smooth muscle antibody, anti-liver kidney antibody, immunoglobulins (IgG quantitative) for autoimmune etiologies   - Please obtain abd US with doppler.   - Patient is unlikely a candidate for LT due to multiple admissions in the past with alcohol withdrawal.    59 years old male with h/o hronic ETOH abuse and dependence (1 bottle of Vodka a day) with long standing hx of alcohol withdrawal and DTs with frequent hospital/ICU admissions, alcoholic gastritis/esophagitis, PUD, HLD, hx of hypoxic respiratory failure requiring intubation due to aspiration PNA (most recent intubation Aril 2020),  staph PNA, COVID-19 infection Dec 2020 presented with abd pain at the OSH.    #Decompensated alcoholic cirrhosis  #Acute on chronic liver failure  - Calculated MELD 3 score on 9/16 - 34  - Presented with AMS at OSH. Had multiple hospitalizations in the past for alcohol withdrawal and DT per records. Unclear last alcohol drink. His alcohol level this admission was <10 and Tylenol level was 42 on admission.   - In terms of liver enzymes, patient had normal liver enzymes on 8/2024 when he was admitted for withdrawal with high alcohol level. On 9/13 admission, his AST/ALT 2000/675 with bili 2.8. Now increased AST 02873/ALT 5000s, stable since yesterday. INR has been increasing from 1.25 to 7.25. Patient has received multiple blood products to decrease his INR. Concerned that he might be going into DIC. Initially was hypotensive and required pressors at the OSH. Currently not on pressor support.   - RUQ US: Distended gallbladder with wall thickening up to 5 mm and trace pericholecystic fluid without evidence of cholelithiasis or biliary dilatation. Negative sonographic Gibson's sign.   - CTAP: Nondistended gallbladder with diffuse nonspecific wall edema. Severe fatty liver.  - Could be due to Tylenol use in the setting of underlying liver disease causing acute elevation in his liver enzymes. However, could also be due to ischemia as he was hypotensive at OSH, concerned for possible sepsis. GB distended concerning for acute cholecystitis, no other sources of infection. No ascites seen on imaging.   - Other lab serologies: CMV, EBV PCR neg, Hep B/C neg.   - Unable to place the NGT due to low platelets at this time, has been getting lactulose enema - had 4 BMs overnight, still lethargic with no response to verbal stimuli.   - Other lab serologies: Hep B/C ab    Recommendations:   - Continue with NAC drip.   - continue with lactulose enema, goal 3-4 BMs per day.   - please send alpha-1 anti-trypsin (phenotype), ceruloplasm, AFP  - please send CHANO, anti-mitochondrial antibody, anti-smooth muscle antibody, anti-liver kidney antibody, immunoglobulins (IgG quantitative) for autoimmune etiologies   - Please obtain abd US with doppler.   - Patient is unlikely a candidate for LT due to multiple admissions in the past with alcohol withdrawal.   - TEG levels daily and replete appropriately.   - Daily MELD labs.     Discussed the case with Dr. Mays.    All recommendations are tentative until note is attested by an attending.     Edvin Garsia, PGY-6  Gastroenterology/Hepatology Fellow  Available on [employer] Teams  [phone] (Short Range Pager)  [phone] (Long Range Pager)    After 5pm, please contact the on-call GI fellow.       59 years old male with h/o hronic ETOH abuse and dependence (1 bottle of Vodka a day) with long standing hx of alcohol withdrawal and DTs with frequent hospital/ICU admissions, alcoholic gastritis/esophagitis, PUD, HLD, hx of hypoxic respiratory failure requiring intubation due to aspiration PNA (most recent intubation Aril 2020),  staph PNA, COVID-19 infection Dec 2020 presented with abd pain at the OSH.    #Decompensated alcoholic cirrhosis  #Acute on chronic liver failure  - Calculated MELD 3 score on 9/16 - 34  - Presented with AMS at OSH. Had multiple hospitalizations in the past for alcohol withdrawal and DT per records. Unclear last alcohol drink. His alcohol level this admission was <10 and Tylenol level was 42 on admission.   - In terms of liver enzymes, patient had normal liver enzymes on 8/2024 when he was admitted for withdrawal with high alcohol level. On 9/13 admission, his AST/ALT 2000/675 with bili 2.8. Now increased AST 85315/ALT 5000s, stable since yesterday. INR has been increasing from 1.25 to 7.25. Patient has received multiple blood products to decrease his INR. Concerned that he might be going into DIC. Initially was hypotensive and required pressors at the OSH. Currently not on pressor support.   - RUQ US: Distended gallbladder with wall thickening up to 5 mm and trace pericholecystic fluid without evidence of cholelithiasis or biliary dilatation. Negative sonographic Gibson's sign.   - CTAP: Nondistended gallbladder with diffuse nonspecific wall edema. Severe fatty liver.  - Could be due to Tylenol use in the setting of underlying liver disease causing acute elevation in his liver enzymes. However, could also be due to ischemia as he was hypotensive at OSH, concerned for possible sepsis. GB distended concerning for acute cholecystitis, no other sources of infection. No ascites seen on imaging.   - Other lab serologies: CMV, EBV PCR neg, Hep B/C neg.   - Unable to place the NGT due to low platelets at this time, has been getting lactulose enema - had 4 BMs overnight, still lethargic with no response to verbal stimuli.   - Other lab serologies: Hep B/C ab    Recommendations:   - Continue with NAC drip.   - continue with lactulose enema, goal 3-4 BMs per day. Would consider placing an NGT again if possible.   - please send alpha-1 anti-trypsin (phenotype), ceruloplasm, AFP  - please send CHANO, anti-mitochondrial antibody, anti-smooth muscle antibody, anti-liver kidney antibody, immunoglobulins (IgG quantitative) for autoimmune etiologies   - Please obtain abd US with doppler.   - Patient is unlikely a candidate for LT due to multiple admissions in the past with alcohol withdrawal.   - TEG levels daily and replete appropriately.   - Daily MELD labs.     Discussed the case with Dr. Mays.    All recommendations are tentative until note is attested by an attending.     Edvin Garsia, PGY-6  Gastroenterology/Hepatology Fellow  Available on Microsoft Teams  [phone] (Short Range Pager)  [phone] (Long Range Pager)    After 5pm, please contact the on-call GI fellow.       59 years old male with h/o hronic ETOH abuse and dependence (1 bottle of Vodka a day) with long standing hx of alcohol withdrawal and DTs with frequent hospital/ICU admissions, alcoholic gastritis/esophagitis, PUD, HLD, hx of hypoxic respiratory failure requiring intubation due to aspiration PNA (most recent intubation Aril 2020),  staph PNA, COVID-19 infection Dec 2020 presented with abd pain at the OSH.    #Decompensated alcoholic cirrhosis  #Acute on chronic liver failure  - Calculated MELD 3 score on 9/16 - 34  - Presented with AMS at OSH. Had multiple hospitalizations in the past for alcohol withdrawal and DT per records. Unclear last alcohol drink. His alcohol level this admission was <10 and Tylenol level was 42 on admission.   - In terms of liver enzymes, patient had normal liver enzymes on 8/2024 when he was admitted for withdrawal with high alcohol level. On 9/13 admission, his AST/ALT 2000/675 with bili 2.8. Now increased AST 13086/ALT 5000s, stable since yesterday. INR has been increasing from 1.25 to 7.25. Patient has received multiple blood products to decrease his INR. Concerned that he might be going into DIC. Initially was hypotensive and required pressors at the OSH. Currently not on pressor support.   - RUQ US: Distended gallbladder with wall thickening up to 5 mm and trace pericholecystic fluid without evidence of cholelithiasis or biliary dilatation. Negative sonographic Gibson's sign.   - CTAP: Nondistended gallbladder with diffuse nonspecific wall edema. Severe fatty liver.  - Could be due to Tylenol use in the setting of underlying liver disease causing acute elevation in his liver enzymes. However, could also be due to ischemia as he was hypotensive at OSH, concerned for possible sepsis. GB distended concerning for acute cholecystitis, no other sources of infection. No ascites seen on imaging.   - Other lab serologies: CMV, EBV PCR neg, Hep B/C neg.   - Unable to place the NGT due to low platelets at this time, has been getting lactulose enema - had 4 BMs overnight, still lethargic with no response to verbal stimuli.   - Other lab serologies: Hep B/C ab    Recommendations:   - Continue with NAC drip.   - continue with lactulose enema, goal 3-4 BMs per day. Would consider placing an NGT again if possible.   - please send alpha-1 anti-trypsin (phenotype), ceruloplasm, AFP  - please send CHANO, anti-mitochondrial antibody, anti-smooth muscle antibody, anti-liver kidney antibody, immunoglobulins (IgG quantitative) for autoimmune etiologies   - Please obtain abd US with doppler.   - Patient is unlikely a candidate for LT due to multiple admissions in the past with alcohol withdrawal.   - TEG levels daily and replete appropriately.   - Q6 MELD labs.     Discussed the case with Dr. Mays.    All recommendations are tentative until note is attested by an attending.     Edvin Garsia, PGY-6  Gastroenterology/Hepatology Fellow  Available on Microsoft Teams  [phone] (Short Range Pager)  [phone] (Long Range Pager)    After 5pm, please contact the on-call GI fellow.

## 2024-09-16 NOTE — OCCUPATIONAL THERAPY INITIAL EVALUATION ADULT - NS ASR FOLLOW COMMAND OT EVAL
unable to follow commands/unable to answer questions dysarthric, altering between English and Leah t/o session/50% of the time/able to follow single-step instructions/speech unintelligible

## 2024-09-16 NOTE — PROGRESS NOTE ADULT - ATTENDING COMMENTS
59M, severe AUD (1 bottle of Vodka a day), h/o alcohol withdrawal and DTs with frequent hospital/ICU admissions last 8/21/24 left AMA, alcoholic gastritis/esophagitis, PUD, HLD, h/o aspiration PNA 2020, staph PNA, COVID-19 infection Dec 2020, initially admitted to an OSH on 9/13/24 with alc withdrawal, N/V. Bilirubin 2.1, AST/ALT 2100/600, transferred to [facility] on 9/15/24 because of rising LFTs. ED note OSH: no drink in 2d.  In the last 6 months, he had brief admissions in March, June, and August for abdom. pain and alcohol intoxication, and four ED visits for the same.     Course: hepatic encephalopathy, max. AST/ALT 17,577/5,982 (9/15), max. INR 7.31 (9/15), rising bilirubin to 7 on 9/16. Initial -170 in MICU. 9/14: sBP   CT fatty liver, Tylenol lvl 42, started NAC gtt    # acute liver failure, improving  - HE resolving, oriented x 3  - INR 5.73, s/p cryo x2 on 9/15  - ascites: trace pericholecystic fluid  - negative hepatitis A/B/C serologies, EBV PCR, CMV PCR, Tylenol level    # bilat. calf pain and some tenderness, soft, no swelling    Cause is unclear thus far. He denies new medications or other remedies. DD includes infection, rhabdomyolysis may contribute to picture    - daily MELD labs  - send CPK, f/u HSV 1/2 PCR  - Duplex US legs  - continue empiric ceftriaxone, NAC gtt, give lactulose PO,   - may not need liver transplant evaluation if liver continues to improve. Frequent alcohol-related problems are a very significant concern.  Pt. is still critically ill. Case briefly discussed with Dr. Cabrera from liver transplant service. 59M, severe AUD (1 bottle of Vodka a day), h/o alcohol withdrawal and DTs with frequent hospital/ICU admissions last 8/21/24 left AMA, alcoholic gastritis/esophagitis, PUD, HLD, h/o aspiration PNA 2020, staph PNA, COVID-19 infection Dec 2020, initially admitted to an OSH on 9/13/24 with alc withdrawal, N/V. Bilirubin 2.1, AST/ALT 2100/600, transferred to [facility] on 9/15/24 because of rising LFTs. ED note OSH: no drink in 2d.  In the last 6 months, he had brief admissions in March, June, and August for abdom. pain and alcohol intoxication, and four ED visits for the same.     Course: hepatic encephalopathy, max. AST/ALT 17,577/5,982 (9/15), max. INR 7.31 (9/15), rising bilirubin to 7 on 9/16. Initial -170 in MICU. 9/14: sBP   CT fatty liver, Tylenol lvl 42, started NAC gtt    # acute liver failure, improving. DD includes shock liver  - HE: not intubated, was grunting on exam, did not follow commands  - INR 5.73, s/p cryo x2 on 9/15  - ascites: trace pericholecystic fluid  - negative hepatitis A/B/C serologies, EBV PCR, CMV PCR, Tylenol level    Plan:  - agree with ICU management   - may not need liver transplant evaluation if liver continues to improve. Frequent alcohol-related problems are a very significant concern.  Pt. is still critically ill. Case briefly discussed with Dr. Cabrera from liver transplant service.

## 2024-09-16 NOTE — OCCUPATIONAL THERAPY INITIAL EVALUATION ADULT - LIVES WITH, PROFILE
pt unable to provide. Per chart, pt lives in a pvt home with spouse, 5 steps to enter. (I) ADLs and transfers

## 2024-09-16 NOTE — PROGRESS NOTE ADULT - ATTENDING COMMENTS
Dr. Light: I have personally performed a face to face bedside history and physical examination of this patient. I have discussed the history, examination, review of systems, assessment and plan of management with the fellow. I have reviewed the electronic medical record and amended it to reflect my history, review of systems, physical exam, assessment and plan.

## 2024-09-16 NOTE — OCCUPATIONAL THERAPY INITIAL EVALUATION ADULT - RANGE OF MOTION EXAMINATION, LOWER EXTREMITY
bilateral LE Passive ROM was WNL (within normal limits) bilateral LE Active Assistive ROM was WFL  (within functional limits)

## 2024-09-16 NOTE — PHYSICAL THERAPY INITIAL EVALUATION ADULT - PERTINENT HX OF CURRENT PROBLEM, REHAB EVAL
59 year old M with PMHx ETOH abuse with frequent admissions for withdrawal & gastritis admitted to ICU for acute on chronic decompensated liver failure & alcohol withdrawal. CT A/P Nondistended gallbladder with diffuse nonspecific wall edema. Severe fatty liver. CXR No subdiaphragmatic free air or pneumothorax. No radiographic evidence of active chest disease..

## 2024-09-16 NOTE — OCCUPATIONAL THERAPY INITIAL EVALUATION ADULT - PERSONAL SAFETY AND JUDGMENT, REHAB EVAL
B/L wrist restraints/impaired decreased safety awareness during transfers; decreased insight/impaired

## 2024-09-16 NOTE — PROGRESS NOTE ADULT - ASSESSMENT
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 Pt is found to be in acute liver failure (downtrending LFTs), DIC, and encephalopathic. While the patient does suffer from alcohol abuse the acute onset of liver failure is more consistent with acetaminophen toxicity however possibly multifactorial.     Recommendations:   -Evaluate for etiology encephalopathy could be secondary to liver failure however with severe thrombocytopenia and abrasion on the L scalp recommend head CT and further medical workup for his AMS.   -Continue NAC 100mg/kg continuous until liver function is back to baseline  -Continue Hepatology consultation, today's hesham's criteria: 1    Thank you for involving us in the care of this patient. Assessment and plan discussed with toxicology attending Dr. Darline Light. Please do not hesitate to reach out to the toxicology team for any further questions or concerns.    The On-Call Toxicology Fellow can be reached 24/7 via Pager #[phone]  Please send a 10 digit call back # as [place] cover multiple hospitals    Meliza Davis MD  Toxicology Fellow  PGY-4

## 2024-09-16 NOTE — PROGRESS NOTE ADULT - ASSESSMENT
Patient is a 59 year old M with PMHx ETOH abuse with frequent admissions for withdrawal & gastritis admitted to ICU for acute on chronic decompensated liver failure & alcohol withdrawal.     # Neuro:  Acute Metabolic Encephalopathy   -Likely secondary to elevated ammonia level along with liver failure.   - -Will start lactulose after placing NGT    ETOH Withdawal  Symptom triggered CIWA started     #Resp:   - 2L NC; satting adequately to maintain sats>92%; titrate FIo2 as tolerated   - protecting airway  - end tidal CO2    #CV:  -Patient not on any vasopressors at this time. Hemodynamically stable.     #GI:  // Acute Decompensated Liver Failure - patient with long history of etoh abuse with evidence of hepatic steatosis on imaging.    - RUQ US: Distended gallbladder with wall thickening up to 5 mm and trace pericholecystic fluid without evidence of cholelithiasis or biliary dilatation. Negative sonographic Gibson's sign.   - CTAP: Nondistended gallbladder with diffuse nonspecific wall edema. Severe fatty liver.  - labs significant for thrombocytopenia, transaminitis, anemia, elevated bilirubin & decreased fibrinogen, elevated lactate.   Plan  -Continue Thiamine and Folate  -Continue NAC   -Hepatology consulted.   -F/U Abdomen doppler results   -Ordered solumedrol 80mg daily       #Renal:  //ROSA   -Pre-renal vs ATN  -Monitor Cr and Urine output  -Renally dose medication        #ID:  - afebrile, wbc nl  - cont to monitor off abx     #Heme:  Concern for DIC   - elevated coags, d-dimer & fibrinogen 40  - S/P 1u cryo & 1 FFP in setting of DIC and liver failure   - FU CBC. tranfuse for Hg <7   -Ordered another unit cryoprecipitate and Q6 DIC labs   -Vitamin K ordered     #Endo:  - cont to monitor FS Q6H while NPO.        Patient is a 59 year old M with PMHx ETOH abuse with frequent admissions for withdrawal & gastritis admitted to ICU for acute on chronic decompensated liver failure & alcohol withdrawal.     # Neuro:  Acute Metabolic Encephalopathy   -Likely secondary to elevated ammonia level along with liver failure.   - Tried multiple times to place NGT but kept coiling- started Lactulose enemas     ETOH Withdawal  -Symptom triggered CIWA started     #Resp:   - 2L NC; satting adequately to maintain sats>92%; titrate FIo2 as tolerated   - protecting airway  - end tidal CO2    #CV:  -Patient not on any vasopressors at this time. Hemodynamically stable.     #GI:  // Acute Decompensated Liver Failure - patient with long history of etoh abuse with evidence of hepatic steatosis on imaging.    - RUQ US: Distended gallbladder with wall thickening up to 5 mm and trace pericholecystic fluid without evidence of cholelithiasis or biliary dilatation. Negative sonographic Gibson's sign.   - CTAP: Nondistended gallbladder with diffuse nonspecific wall edema. Severe fatty liver.  - labs significant for thrombocytopenia, transaminitis, anemia, elevated bilirubin & decreased fibrinogen, elevated lactate.   Plan  -Continue Thiamine and Folate  -Continue NAC   -Hepatology consulted- ordered Lab work recommended by them   -Ordered solumedrol 80mg daily       #Renal:  //ROSA   -Pre-renal vs ATN  -Monitor Cr and Urine output  -Renally dose medication        #ID:  - afebrile, wbc nl  - cont to monitor off abx     #Heme:  Concern for DIC   - elevated coags, d-dimer & fibrinogen 40  - S/P 1u cryo & 1 FFP in setting of DIC and liver failure   - FU CBC. tranfuse for Hg <7   -Ordered another unit cryoprecipitate and Q6 DIC labs   -Vitamin K ordered     #Endo:  - cont to monitor FS Q6H while NPO.        Patient is a 59 year old M with PMHx ETOH abuse with frequent admissions for withdrawal & gastritis admitted to ICU for acute on chronic decompensated liver failure & alcohol withdrawal.     # Neuro:  Acute Metabolic Encephalopathy   -Likely secondary to elevated ammonia level along with liver failure.   - Tried multiple times to place NGT but kept coiling- started Lactulose enemas     ETOH Withdawal  -Will stop CIWA and monitor- out of window for DTs    #Resp:   -Saturating well on room air     #CV:  -Patient not on any vasopressors at this time. Hemodynamically stable.     #GI:  // Acute Decompensated Liver Failure - patient with long history of etoh abuse with evidence of hepatic steatosis on imaging.  Appears to be related to a prior ischemic episode   - RUQ US: Distended gallbladder with wall thickening up to 5 mm and trace pericholecystic fluid without evidence of cholelithiasis or biliary dilatation. Negative sonographic Gibson's sign.   - CTAP: Nondistended gallbladder with diffuse nonspecific wall edema. Severe fatty liver.  - labs significant for thrombocytopenia, transaminitis, anemia, elevated bilirubin & decreased fibrinogen, elevated lactate.   Plan  -Continue Thiamine and Folate  -Continue NAC   -Hepatology consulted- ordered Lab work recommended by them   - Will stop steroids- less likely alcohol related       #Renal:  //ROSA   -Pre-renal vs ATN  -Monitor Cr and Urine output  -Renally dose medication        #ID:  - afebrile, wbc nl  -On ceftriaxone for SBP ppx     #Heme:  Concern for DIC   - elevated coags, d-dimer & fibrinogen 40  - S/P 1u cryo & 1 FFP in setting of DIC and liver failure   - FU CBC. tranfuse for Hg <7   -Ordered another unit cryoprecipitate and Q6 DIC labs   -Vitamin K ordered     #Endo:  - cont to monitor FS Q6H while NPO.

## 2024-09-17 LAB
ADD ON TEST-SPECIMEN IN LAB: SIGNIFICANT CHANGE UP
ALBUMIN SERPL ELPH-MCNC: 2.6 G/DL — LOW (ref 3.3–5)
ALBUMIN SERPL ELPH-MCNC: 2.6 G/DL — LOW (ref 3.3–5)
ALBUMIN SERPL ELPH-MCNC: 2.7 G/DL — LOW (ref 3.3–5)
ALBUMIN SERPL ELPH-MCNC: 2.7 G/DL — LOW (ref 3.3–5)
ALP SERPL-CCNC: 105 U/L — SIGNIFICANT CHANGE UP (ref 40–120)
ALP SERPL-CCNC: 94 U/L — SIGNIFICANT CHANGE UP (ref 40–120)
ALP SERPL-CCNC: 95 U/L — SIGNIFICANT CHANGE UP (ref 40–120)
ALP SERPL-CCNC: 97 U/L — SIGNIFICANT CHANGE UP (ref 40–120)
ALT FLD-CCNC: 2148 U/L — HIGH (ref 10–45)
ALT FLD-CCNC: 2230 U/L — HIGH (ref 10–45)
ALT FLD-CCNC: 2476 U/L — HIGH (ref 10–45)
ALT FLD-CCNC: 3122 U/L — HIGH (ref 10–45)
AMMONIA BLD-MCNC: 141 UMOL/L — HIGH (ref 11–55)
AMMONIA BLD-MCNC: 153 UMOL/L — HIGH (ref 11–55)
AMMONIA BLD-MCNC: 227 UMOL/L — HIGH (ref 11–55)
AMMONIA BLD-MCNC: 80 UMOL/L — HIGH (ref 11–55)
ANION GAP SERPL CALC-SCNC: 11 MMOL/L — SIGNIFICANT CHANGE UP (ref 5–17)
ANION GAP SERPL CALC-SCNC: 13 MMOL/L — SIGNIFICANT CHANGE UP (ref 5–17)
APTT BLD: 32.8 SEC — SIGNIFICANT CHANGE UP (ref 24.5–35.6)
APTT BLD: 33.1 SEC — SIGNIFICANT CHANGE UP (ref 24.5–35.6)
APTT BLD: 34 SEC — SIGNIFICANT CHANGE UP (ref 24.5–35.6)
APTT BLD: 34.6 SEC — SIGNIFICANT CHANGE UP (ref 24.5–35.6)
AST SERPL-CCNC: 1476 U/L — HIGH (ref 10–40)
AST SERPL-CCNC: 1742 U/L — HIGH (ref 10–40)
AST SERPL-CCNC: 2033 U/L — HIGH (ref 10–40)
AST SERPL-CCNC: 3712 U/L — HIGH (ref 10–40)
BASOPHILS # BLD AUTO: 0 K/UL — SIGNIFICANT CHANGE UP (ref 0–0.2)
BASOPHILS NFR BLD AUTO: 0 % — SIGNIFICANT CHANGE UP (ref 0–2)
BILIRUB SERPL-MCNC: 7.9 MG/DL — HIGH (ref 0.2–1.2)
BILIRUB SERPL-MCNC: 7.9 MG/DL — HIGH (ref 0.2–1.2)
BILIRUB SERPL-MCNC: 8 MG/DL — HIGH (ref 0.2–1.2)
BILIRUB SERPL-MCNC: 8.3 MG/DL — HIGH (ref 0.2–1.2)
BUN SERPL-MCNC: 20 MG/DL — SIGNIFICANT CHANGE UP (ref 7–23)
BUN SERPL-MCNC: 26 MG/DL — HIGH (ref 7–23)
BUN SERPL-MCNC: 26 MG/DL — HIGH (ref 7–23)
BUN SERPL-MCNC: 27 MG/DL — HIGH (ref 7–23)
CALCIUM SERPL-MCNC: 7.5 MG/DL — LOW (ref 8.4–10.5)
CALCIUM SERPL-MCNC: 7.5 MG/DL — LOW (ref 8.4–10.5)
CALCIUM SERPL-MCNC: 7.6 MG/DL — LOW (ref 8.4–10.5)
CALCIUM SERPL-MCNC: 7.7 MG/DL — LOW (ref 8.4–10.5)
CHLORIDE SERPL-SCNC: 104 MMOL/L — SIGNIFICANT CHANGE UP (ref 96–108)
CHLORIDE SERPL-SCNC: 106 MMOL/L — SIGNIFICANT CHANGE UP (ref 96–108)
CHLORIDE SERPL-SCNC: 107 MMOL/L — SIGNIFICANT CHANGE UP (ref 96–108)
CHLORIDE SERPL-SCNC: 108 MMOL/L — SIGNIFICANT CHANGE UP (ref 96–108)
CO2 SERPL-SCNC: 23 MMOL/L — SIGNIFICANT CHANGE UP (ref 22–31)
CO2 SERPL-SCNC: 25 MMOL/L — SIGNIFICANT CHANGE UP (ref 22–31)
CREAT SERPL-MCNC: 1.19 MG/DL — SIGNIFICANT CHANGE UP (ref 0.5–1.3)
CREAT SERPL-MCNC: 1.22 MG/DL — SIGNIFICANT CHANGE UP (ref 0.5–1.3)
CREAT SERPL-MCNC: 1.23 MG/DL — SIGNIFICANT CHANGE UP (ref 0.5–1.3)
CREAT SERPL-MCNC: 1.27 MG/DL — SIGNIFICANT CHANGE UP (ref 0.5–1.3)
D DIMER BLD IA.RAPID-MCNC: HIGH NG/ML DDU
D DIMER BLD IA.RAPID-MCNC: HIGH NG/ML DDU
EGFR: 66 ML/MIN/1.73M2 — SIGNIFICANT CHANGE UP
EGFR: 68 ML/MIN/1.73M2 — SIGNIFICANT CHANGE UP
EGFR: 69 ML/MIN/1.73M2 — SIGNIFICANT CHANGE UP
EGFR: 71 ML/MIN/1.73M2 — SIGNIFICANT CHANGE UP
EOSINOPHIL # BLD AUTO: 0 K/UL — SIGNIFICANT CHANGE UP (ref 0–0.5)
EOSINOPHIL # BLD AUTO: 0 K/UL — SIGNIFICANT CHANGE UP (ref 0–0.5)
EOSINOPHIL # BLD AUTO: 0.04 K/UL — SIGNIFICANT CHANGE UP (ref 0–0.5)
EOSINOPHIL NFR BLD AUTO: 0 % — SIGNIFICANT CHANGE UP (ref 0–6)
EOSINOPHIL NFR BLD AUTO: 0 % — SIGNIFICANT CHANGE UP (ref 0–6)
EOSINOPHIL NFR BLD AUTO: 0.9 % — SIGNIFICANT CHANGE UP (ref 0–6)
FIBRINOGEN PPP-MCNC: 100 MG/DL — CRITICAL LOW (ref 200–445)
FIBRINOGEN PPP-MCNC: 123 MG/DL — LOW (ref 200–445)
FIBRINOGEN PPP-MCNC: 131 MG/DL — LOW (ref 200–445)
FIBRINOGEN PPP-MCNC: 76 MG/DL — CRITICAL LOW (ref 200–445)
FIBRINOGEN PPP-MCNC: 95 MG/DL — CRITICAL LOW (ref 200–445)
GAS PNL BLDA: SIGNIFICANT CHANGE UP
GAS PNL BLDV: SIGNIFICANT CHANGE UP
GLUCOSE SERPL-MCNC: 145 MG/DL — HIGH (ref 70–99)
GLUCOSE SERPL-MCNC: 150 MG/DL — HIGH (ref 70–99)
GLUCOSE SERPL-MCNC: 152 MG/DL — HIGH (ref 70–99)
GLUCOSE SERPL-MCNC: 169 MG/DL — HIGH (ref 70–99)
HCT VFR BLD CALC: 27.1 % — LOW (ref 39–50)
HCT VFR BLD CALC: 27.4 % — LOW (ref 39–50)
HCT VFR BLD CALC: 27.6 % — LOW (ref 39–50)
HCT VFR BLD CALC: 29.1 % — LOW (ref 39–50)
HGB BLD-MCNC: 8.6 G/DL — LOW (ref 13–17)
HGB BLD-MCNC: 8.7 G/DL — LOW (ref 13–17)
HGB BLD-MCNC: 9.1 G/DL — LOW (ref 13–17)
HGB BLD-MCNC: 9.3 G/DL — LOW (ref 13–17)
IMM GRANULOCYTES NFR BLD AUTO: 1.1 % — HIGH (ref 0–0.9)
IMM GRANULOCYTES NFR BLD AUTO: 1.4 % — HIGH (ref 0–0.9)
INR BLD: 3.06 RATIO — HIGH (ref 0.85–1.18)
INR BLD: 3.19 RATIO — HIGH (ref 0.85–1.18)
INR BLD: 3.51 RATIO — HIGH (ref 0.85–1.18)
INR BLD: 4.06 RATIO — HIGH (ref 0.85–1.18)
LDH SERPL L TO P-CCNC: 460 U/L — HIGH (ref 50–242)
LDH SERPL L TO P-CCNC: 680 U/L — HIGH (ref 50–242)
LKM AB SER-ACNC: <20.1 UNITS — SIGNIFICANT CHANGE UP (ref 0–20)
LYMPHOCYTES # BLD AUTO: 0.24 K/UL — LOW (ref 1–3.3)
LYMPHOCYTES # BLD AUTO: 0.44 K/UL — LOW (ref 1–3.3)
LYMPHOCYTES # BLD AUTO: 0.47 K/UL — LOW (ref 1–3.3)
LYMPHOCYTES # BLD AUTO: 11.3 % — LOW (ref 13–44)
LYMPHOCYTES # BLD AUTO: 12.7 % — LOW (ref 13–44)
LYMPHOCYTES # BLD AUTO: 5.5 % — LOW (ref 13–44)
MAGNESIUM SERPL-MCNC: 2.3 MG/DL — SIGNIFICANT CHANGE UP (ref 1.6–2.6)
MAGNESIUM SERPL-MCNC: 2.4 MG/DL — SIGNIFICANT CHANGE UP (ref 1.6–2.6)
MANUAL SMEAR VERIFICATION: SIGNIFICANT CHANGE UP
MCHC RBC-ENTMCNC: 23.4 PG — LOW (ref 27–34)
MCHC RBC-ENTMCNC: 23.6 PG — LOW (ref 27–34)
MCHC RBC-ENTMCNC: 24 PG — LOW (ref 27–34)
MCHC RBC-ENTMCNC: 24.4 PG — LOW (ref 27–34)
MCHC RBC-ENTMCNC: 31.7 GM/DL — LOW (ref 32–36)
MCHC RBC-ENTMCNC: 31.8 GM/DL — LOW (ref 32–36)
MCHC RBC-ENTMCNC: 32 GM/DL — SIGNIFICANT CHANGE UP (ref 32–36)
MCHC RBC-ENTMCNC: 33 GM/DL — SIGNIFICANT CHANGE UP (ref 32–36)
MCV RBC AUTO: 73.8 FL — LOW (ref 80–100)
MCV RBC AUTO: 74 FL — LOW (ref 80–100)
MCV RBC AUTO: 74.3 FL — LOW (ref 80–100)
MCV RBC AUTO: 75 FL — LOW (ref 80–100)
MONOCYTES # BLD AUTO: 0.47 K/UL — SIGNIFICANT CHANGE UP (ref 0–0.9)
MONOCYTES # BLD AUTO: 0.48 K/UL — SIGNIFICANT CHANGE UP (ref 0–0.9)
MONOCYTES # BLD AUTO: 0.75 K/UL — SIGNIFICANT CHANGE UP (ref 0–0.9)
MONOCYTES NFR BLD AUTO: 10.7 % — SIGNIFICANT CHANGE UP (ref 2–14)
MONOCYTES NFR BLD AUTO: 12.2 % — SIGNIFICANT CHANGE UP (ref 2–14)
MONOCYTES NFR BLD AUTO: 20.3 % — HIGH (ref 2–14)
NEUTROPHILS # BLD AUTO: 2.43 K/UL — SIGNIFICANT CHANGE UP (ref 1.8–7.4)
NEUTROPHILS # BLD AUTO: 2.96 K/UL — SIGNIFICANT CHANGE UP (ref 1.8–7.4)
NEUTROPHILS # BLD AUTO: 3.62 K/UL — SIGNIFICANT CHANGE UP (ref 1.8–7.4)
NEUTROPHILS NFR BLD AUTO: 65.6 % — SIGNIFICANT CHANGE UP (ref 43–77)
NEUTROPHILS NFR BLD AUTO: 75.6 % — SIGNIFICANT CHANGE UP (ref 43–77)
NEUTROPHILS NFR BLD AUTO: 82.7 % — HIGH (ref 43–77)
NRBC # BLD: 0 /100 WBCS — SIGNIFICANT CHANGE UP (ref 0–0)
PHOSPHATE SERPL-MCNC: 1.8 MG/DL — LOW (ref 2.5–4.5)
PHOSPHATE SERPL-MCNC: 2.2 MG/DL — LOW (ref 2.5–4.5)
PHOSPHATE SERPL-MCNC: 2.4 MG/DL — LOW (ref 2.5–4.5)
PHOSPHATE SERPL-MCNC: 2.8 MG/DL — SIGNIFICANT CHANGE UP (ref 2.5–4.5)
PLAT MORPH BLD: ABNORMAL
PLATELET # BLD AUTO: 62 K/UL — LOW (ref 150–400)
PLATELET # BLD AUTO: 65 K/UL — LOW (ref 150–400)
PLATELET # BLD AUTO: 68 K/UL — LOW (ref 150–400)
PLATELET # BLD AUTO: 71 K/UL — LOW (ref 150–400)
POTASSIUM SERPL-MCNC: 3.2 MMOL/L — LOW (ref 3.5–5.3)
POTASSIUM SERPL-MCNC: 3.4 MMOL/L — LOW (ref 3.5–5.3)
POTASSIUM SERPL-MCNC: 3.5 MMOL/L — SIGNIFICANT CHANGE UP (ref 3.5–5.3)
POTASSIUM SERPL-MCNC: 3.5 MMOL/L — SIGNIFICANT CHANGE UP (ref 3.5–5.3)
POTASSIUM SERPL-SCNC: 3.2 MMOL/L — LOW (ref 3.5–5.3)
POTASSIUM SERPL-SCNC: 3.4 MMOL/L — LOW (ref 3.5–5.3)
POTASSIUM SERPL-SCNC: 3.5 MMOL/L — SIGNIFICANT CHANGE UP (ref 3.5–5.3)
POTASSIUM SERPL-SCNC: 3.5 MMOL/L — SIGNIFICANT CHANGE UP (ref 3.5–5.3)
PROT SERPL-MCNC: 4.9 G/DL — LOW (ref 6–8.3)
PROT SERPL-MCNC: 4.9 G/DL — LOW (ref 6–8.3)
PROT SERPL-MCNC: 5 G/DL — LOW (ref 6–8.3)
PROT SERPL-MCNC: 5 G/DL — LOW (ref 6–8.3)
PROTHROM AB SERPL-ACNC: 32.6 SEC — HIGH (ref 9.5–13)
PROTHROM AB SERPL-ACNC: 33.9 SEC — HIGH (ref 9.5–13)
PROTHROM AB SERPL-ACNC: 37.2 SEC — HIGH (ref 9.5–13)
PROTHROM AB SERPL-ACNC: 40.9 SEC — HIGH (ref 9.5–13)
RAPIDTEG MAXIMUM AMPLITUDE: <40 MM — LOW (ref 52–70)
RBC # BLD: 3.67 M/UL — LOW (ref 4.2–5.8)
RBC # BLD: 3.69 M/UL — LOW (ref 4.2–5.8)
RBC # BLD: 3.73 M/UL — LOW (ref 4.2–5.8)
RBC # BLD: 3.88 M/UL — LOW (ref 4.2–5.8)
RBC # FLD: 20.4 % — HIGH (ref 10.3–14.5)
RBC # FLD: 20.4 % — HIGH (ref 10.3–14.5)
RBC # FLD: 20.8 % — HIGH (ref 10.3–14.5)
RBC # FLD: 20.9 % — HIGH (ref 10.3–14.5)
RBC BLD AUTO: SIGNIFICANT CHANGE UP
SODIUM SERPL-SCNC: 140 MMOL/L — SIGNIFICANT CHANGE UP (ref 135–145)
SODIUM SERPL-SCNC: 142 MMOL/L — SIGNIFICANT CHANGE UP (ref 135–145)
SODIUM SERPL-SCNC: 143 MMOL/L — SIGNIFICANT CHANGE UP (ref 135–145)
SODIUM SERPL-SCNC: 144 MMOL/L — SIGNIFICANT CHANGE UP (ref 135–145)
TEG FUNCTIONAL FIBRINOGEN: 8.4 MM — LOW (ref 15–32)
TEG LY30 (LYSIS): 0 % — SIGNIFICANT CHANGE UP (ref 0–2.6)
TEG REACTION TIME: 6 MIN — SIGNIFICANT CHANGE UP (ref 4.6–9.1)
WBC # BLD: 3.52 K/UL — LOW (ref 3.8–10.5)
WBC # BLD: 3.7 K/UL — LOW (ref 3.8–10.5)
WBC # BLD: 3.91 K/UL — SIGNIFICANT CHANGE UP (ref 3.8–10.5)
WBC # BLD: 4.38 K/UL — SIGNIFICANT CHANGE UP (ref 3.8–10.5)
WBC # FLD AUTO: 3.52 K/UL — LOW (ref 3.8–10.5)
WBC # FLD AUTO: 3.7 K/UL — LOW (ref 3.8–10.5)
WBC # FLD AUTO: 3.91 K/UL — SIGNIFICANT CHANGE UP (ref 3.8–10.5)
WBC # FLD AUTO: 4.38 K/UL — SIGNIFICANT CHANGE UP (ref 3.8–10.5)

## 2024-09-17 PROCEDURE — 36514 APHERESIS PLASMA: CPT

## 2024-09-17 PROCEDURE — 99291 CRITICAL CARE FIRST HOUR: CPT

## 2024-09-17 PROCEDURE — 99221 1ST HOSP IP/OBS SF/LOW 40: CPT | Mod: 25

## 2024-09-17 PROCEDURE — 70450 CT HEAD/BRAIN W/O DYE: CPT | Mod: 26,59

## 2024-09-17 PROCEDURE — 71045 X-RAY EXAM CHEST 1 VIEW: CPT | Mod: 26,77

## 2024-09-17 PROCEDURE — 70498 CT ANGIOGRAPHY NECK: CPT | Mod: 26

## 2024-09-17 PROCEDURE — 70496 CT ANGIOGRAPHY HEAD: CPT | Mod: 26,76

## 2024-09-17 PROCEDURE — 99221 1ST HOSP IP/OBS SF/LOW 40: CPT

## 2024-09-17 PROCEDURE — 95718 EEG PHYS/QHP 2-12 HR W/VEEG: CPT

## 2024-09-17 PROCEDURE — 99233 SBSQ HOSP IP/OBS HIGH 50: CPT | Mod: GC

## 2024-09-17 PROCEDURE — 71045 X-RAY EXAM CHEST 1 VIEW: CPT | Mod: 26

## 2024-09-17 PROCEDURE — 99291 CRITICAL CARE FIRST HOUR: CPT | Mod: GC,25

## 2024-09-17 PROCEDURE — 31500 INSERT EMERGENCY AIRWAY: CPT | Mod: GC

## 2024-09-17 PROCEDURE — 31645 BRNCHSC W/THER ASPIR 1ST: CPT | Mod: GC

## 2024-09-17 RX ORDER — PHYTONADIONE (VIT K1)
5 CRYSTALS MISCELLANEOUS DAILY
Refills: 0 | Status: DISCONTINUED | OUTPATIENT
Start: 2024-09-18 | End: 2024-09-18

## 2024-09-17 RX ORDER — ROCURONIUM BROMIDE 10 MG/ML
100 INJECTION INTRAVENOUS ONCE
Refills: 0 | Status: DISCONTINUED | OUTPATIENT
Start: 2024-09-17 | End: 2024-09-17

## 2024-09-17 RX ORDER — THIAMINE HYDROCHLORIDE 100 MG/ML
100 INJECTION, SOLUTION INTRAMUSCULAR; INTRAVENOUS DAILY
Refills: 0 | Status: DISCONTINUED | OUTPATIENT
Start: 2024-09-17 | End: 2024-09-24

## 2024-09-17 RX ORDER — PROPOFOL 10 MG/ML
50 INJECTION, EMULSION INTRAVENOUS
Qty: 1000 | Refills: 0 | Status: DISCONTINUED | OUTPATIENT
Start: 2024-09-17 | End: 2024-09-17

## 2024-09-17 RX ORDER — MIDAZOLAM HCL 1 MG/ML
8 VIAL (ML) INJECTION ONCE
Refills: 0 | Status: DISCONTINUED | OUTPATIENT
Start: 2024-09-17 | End: 2024-09-17

## 2024-09-17 RX ORDER — PROPOFOL 10 MG/ML
20 INJECTION, EMULSION INTRAVENOUS
Qty: 1000 | Refills: 0 | Status: DISCONTINUED | OUTPATIENT
Start: 2024-09-17 | End: 2024-09-19

## 2024-09-17 RX ORDER — POTASSIUM PHOSPHATE, MONOBASIC POTASSIUM PHOSPHATE, DIBASIC 224; 236 MG/ML; MG/ML
30 INJECTION, SOLUTION, CONCENTRATE INTRAVENOUS ONCE
Refills: 0 | Status: COMPLETED | OUTPATIENT
Start: 2024-09-17 | End: 2024-09-17

## 2024-09-17 RX ORDER — CHLORHEXIDINE GLUCONATE ORAL RINSE 1.2 MG/ML
1 SOLUTION DENTAL
Refills: 0 | Status: DISCONTINUED | OUTPATIENT
Start: 2024-09-17 | End: 2024-10-03

## 2024-09-17 RX ORDER — PROPOFOL 10 MG/ML
5 INJECTION, EMULSION INTRAVENOUS ONCE
Refills: 0 | Status: DISCONTINUED | OUTPATIENT
Start: 2024-09-17 | End: 2024-09-17

## 2024-09-17 RX ORDER — FENTANYL CITRATE-0.9 % NACL/PF 300MCG/30
100 PATIENT CONTROLLED ANALGESIA VIAL INJECTION ONCE
Refills: 0 | Status: DISCONTINUED | OUTPATIENT
Start: 2024-09-17 | End: 2024-09-17

## 2024-09-17 RX ORDER — LACTULOSE 10 G/15ML
30 SOLUTION ORAL; RECTAL EVERY 6 HOURS
Refills: 0 | Status: DISCONTINUED | OUTPATIENT
Start: 2024-09-17 | End: 2024-09-17

## 2024-09-17 RX ORDER — CHLORHEXIDINE GLUCONATE ORAL RINSE 1.2 MG/ML
15 SOLUTION DENTAL EVERY 12 HOURS
Refills: 0 | Status: DISCONTINUED | OUTPATIENT
Start: 2024-09-17 | End: 2024-09-18

## 2024-09-17 RX ORDER — PROPOFOL 10 MG/ML
100 INJECTION, EMULSION INTRAVENOUS ONCE
Refills: 0 | Status: DISCONTINUED | OUTPATIENT
Start: 2024-09-17 | End: 2024-09-17

## 2024-09-17 RX ORDER — PROPOFOL 10 MG/ML
30 INJECTION, EMULSION INTRAVENOUS
Qty: 500 | Refills: 0 | Status: DISCONTINUED | OUTPATIENT
Start: 2024-09-17 | End: 2024-09-17

## 2024-09-17 RX ORDER — LACTULOSE 10 G/15ML
30 SOLUTION ORAL; RECTAL EVERY 6 HOURS
Refills: 0 | Status: DISCONTINUED | OUTPATIENT
Start: 2024-09-17 | End: 2024-09-18

## 2024-09-17 RX ORDER — DESMOPRESSIN ACETATE 4 UG/ML
0.3 INJECTION, SOLUTION INTRAVENOUS; SUBCUTANEOUS ONCE
Refills: 0 | Status: DISCONTINUED | OUTPATIENT
Start: 2024-09-17 | End: 2024-09-17

## 2024-09-17 RX ORDER — SODIUM CHLORIDE 5 G/100ML
500 INJECTION, SOLUTION INTRAVENOUS
Refills: 0 | Status: DISCONTINUED | OUTPATIENT
Start: 2024-09-17 | End: 2024-09-19

## 2024-09-17 RX ORDER — POTASSIUM PHOSPHATE, MONOBASIC POTASSIUM PHOSPHATE, DIBASIC 224; 236 MG/ML; MG/ML
15 INJECTION, SOLUTION, CONCENTRATE INTRAVENOUS ONCE
Refills: 0 | Status: DISCONTINUED | OUTPATIENT
Start: 2024-09-17 | End: 2024-09-17

## 2024-09-17 RX ORDER — PROPOFOL 10 MG/ML
20 INJECTION, EMULSION INTRAVENOUS
Qty: 1000 | Refills: 0 | Status: DISCONTINUED | OUTPATIENT
Start: 2024-09-17 | End: 2024-09-17

## 2024-09-17 RX ORDER — SODIUM CHLORIDE 0.9 % (FLUSH) 0.9 %
10 SYRINGE (ML) INJECTION
Refills: 0 | Status: DISCONTINUED | OUTPATIENT
Start: 2024-09-17 | End: 2024-09-17

## 2024-09-17 RX ADMIN — Medication 102 MILLIGRAM(S): at 11:15

## 2024-09-17 RX ADMIN — POTASSIUM PHOSPHATE, MONOBASIC POTASSIUM PHOSPHATE, DIBASIC 83.33 MILLIMOLE(S): 224; 236 INJECTION, SOLUTION, CONCENTRATE INTRAVENOUS at 01:24

## 2024-09-17 RX ADMIN — Medication 44.17 GRAM(S): at 05:03

## 2024-09-17 RX ADMIN — LACTULOSE 30 GRAM(S): 10 SOLUTION ORAL; RECTAL at 17:06

## 2024-09-17 RX ADMIN — THIAMINE HYDROCHLORIDE 105 MILLIGRAM(S): 100 INJECTION, SOLUTION INTRAMUSCULAR; INTRAVENOUS at 11:15

## 2024-09-17 RX ADMIN — LACTULOSE 30 GRAM(S): 10 SOLUTION ORAL; RECTAL at 11:15

## 2024-09-17 RX ADMIN — Medication 8 MILLIGRAM(S): at 13:00

## 2024-09-17 RX ADMIN — Medication 50 MILLILITER(S): at 21:21

## 2024-09-17 RX ADMIN — PROPOFOL 9.96 MICROGRAM(S)/KG/MIN: 10 INJECTION, EMULSION INTRAVENOUS at 13:20

## 2024-09-17 RX ADMIN — PANTOPRAZOLE SODIUM 40 MILLIGRAM(S): 40 TABLET, DELAYED RELEASE ORAL at 17:05

## 2024-09-17 RX ADMIN — Medication 100 MICROGRAM(S): at 13:01

## 2024-09-17 RX ADMIN — Medication 100 MICROGRAM(S): at 13:38

## 2024-09-17 RX ADMIN — SODIUM CHLORIDE 30 MILLILITER(S): 5 INJECTION, SOLUTION INTRAVENOUS at 21:21

## 2024-09-17 RX ADMIN — Medication 800 GRAM(S): at 15:30

## 2024-09-17 RX ADMIN — PROPOFOL 9.96 MICROGRAM(S)/KG/MIN: 10 INJECTION, EMULSION INTRAVENOUS at 21:22

## 2024-09-17 RX ADMIN — CHLORHEXIDINE GLUCONATE ORAL RINSE 15 MILLILITER(S): 1.2 SOLUTION DENTAL at 17:05

## 2024-09-17 RX ADMIN — Medication 40 MILLIEQUIVALENT(S): at 22:33

## 2024-09-17 RX ADMIN — Medication 50 MILLILITER(S): at 13:01

## 2024-09-17 RX ADMIN — Medication 50 MILLILITER(S): at 05:06

## 2024-09-17 RX ADMIN — Medication 100 MILLIGRAM(S): at 11:15

## 2024-09-17 RX ADMIN — FOLIC ACID 1 MILLIGRAM(S): 1 TABLET ORAL at 11:15

## 2024-09-17 RX ADMIN — CHLORHEXIDINE GLUCONATE ORAL RINSE 1 APPLICATION(S): 1.2 SOLUTION DENTAL at 19:16

## 2024-09-17 RX ADMIN — PROPOFOL 9.96 MICROGRAM(S)/KG/MIN: 10 INJECTION, EMULSION INTRAVENOUS at 17:07

## 2024-09-17 RX ADMIN — POTASSIUM PHOSPHATE, MONOBASIC POTASSIUM PHOSPHATE, DIBASIC 83.33 MILLIMOLE(S): 224; 236 INJECTION, SOLUTION, CONCENTRATE INTRAVENOUS at 15:11

## 2024-09-17 RX ADMIN — PANTOPRAZOLE SODIUM 40 MILLIGRAM(S): 40 TABLET, DELAYED RELEASE ORAL at 05:06

## 2024-09-17 NOTE — DIETITIAN INITIAL EVALUATION ADULT - ORAL INTAKE PTA/DIET HISTORY
Per H&P at [facility], pt with nausea, vomiting and upper abdominal pain x2? days PTA. Unknown if pt followed therapeutic diet. Pt with Hx of being on altered textured diets per previous speech-language pathology notes; unknown if swallowing issue persisted PTA. Unknown if pt took oral nutrition supplements. Per H&P, took Multiple Vitamins, magnesium oxide, folic acid, and thiamine (as well as medication potassium phosphate-sodium phosphate). NKFA.

## 2024-09-17 NOTE — DIETITIAN INITIAL EVALUATION ADULT - REASON INDICATOR FOR ASSESSMENT
Consult for MST Score 2 or >  Source: Medical record, Interdisciplinary Rounds, and RN (pt disoriented per RN flowsheet)

## 2024-09-17 NOTE — CONSULT NOTE ADULT - CRITICAL CARE ATTENDING COMMENT
HPI as per resident note, personally verified by me. Patient initially presented to [facility] with abdominal pain. He refused labs and only wanted pain control and was then discharged. Took 10-12 Tylenol 500mg tablets at home along with EtoH. He was admitted for liver failure and eventually transferred to [facility] for liver transplant evaluation. During this admission he had gradually worsening mental status. On 9/16 he was moving all extremities but verbalizing incoherently. On 9/17 at 05:00 he became obtunded and was intubated for airway protection. CT head showed sulcal effacement with blurring of the gray-white junction likely consistent with cerebral edema and he was started on mannitol. Some trembling but no other abnormal movements or focal neurologic deficits noted.    GTT:  None    ROS: Due to clinical condition unable to assess (ABBEY)    Gen - Intubated, not sedated, EEG at bedside  CV - Peripheral circulation intact, no evidence of edema  Eyes - Fundoscopy not well visualized    Neurologic exam:  MS - Intubated, not sedated, comatose, no speech output, does not follow commands. ABBEY orientation, rep/naming, attn/conc/recent and remote memory/fund of knowledge  CN - PERRL, EOMI by OCR, (+) face sens/str by corneals b/l, (+) spontaneous respirations (patient rate 19 bpm, vent rate 16 bpm), (+) cough. ABBEY VF, hearing, tongue/palate, trap/SCM  Motor - Normal bulk. Inc extensor tone of R > L UE and BLE's. No spontaneous movements although than mild trembling. Patient with R > L UE decerebrate and BLE decorticate posturing with strong tactile stimuli  Sens - As per Motor above  DTR's - 3+ RUE, 3+ L biceps/BR, 2+ and brisk L triceps, 3+ KJ b/l, 4+ AJ b/l (R > L), and neutral b/l plantar response  Coord - ABBEY  Gait and station - ABBEY    Pertinent labs/studies:  CBC with low WNC 3.52, low H/H 9/28 and MCV dec 74, low plt 65  PT/INR inc 37.2/3.51, PTT WNL  BMP with inc BUN 26, otherwise essentially WNL  Albumin dec 2.7, LFT's with inc ALT/AST 2476/2033  Mg WNL, Phos dec 2.4  Ceruloplasmin dec 12, Hep screen (-), A1C 5.5%, UA (-), NH3 inc 227, CPK inc 725    < from: CT Head No Cont (09.17.24 @ 09:09) >    Diffuse sulcal effacement and loss of gray-white   differentiation as compared to the prior head CT may suggest cerebral   edema versus global hypoxic ischemic injury. MRI is recommended for   further evaluation.    < end of copied text >      A/P:  Encephalopathy  EtOH use disorder  Acute cholecystitis and liver failure  HTN  Pancytopenia  Hyperammonemia  Cerebral edema    - Etiology for diffuse cerebral edema with above exam is most likely secondary to severe hyperammonemia/hepatic encephalopathy. However, cannot completely exclude other causes such as cerebrovascular, infectious, inflammatory, or other toxic/metabolic. CT head, personally reviewed by me, with diffuse sulcal effacement and blurring of the gray-white junction most consistent with cerebral edema but no hemorrhage. Abnormal movements are posturing and NOT seizures  - CTA head/neck w/, CTV head w/o  - MRI brain w/ and w/o  - vEEG to evaluate for focal slowing, epileptiform discharges, or seizures  - Optimize sedation with minimizing propofol, benzodiazepines, or barbiturates (Precedex, fentanyl, or hydromorphone OK)  - Check labs for additional causes with B12, folate, TSH, free T4, Vitamin D 25 OH, B6, WNV, Lyme, syphilis serology TP  - Above recommendations discussed with MICU team, who verbalized agreement and understanding  - Continue to address above medical problems, as you are doing  - Will continue to follow patient with you

## 2024-09-17 NOTE — CONSULT NOTE ADULT - ASSESSMENT
ASSESSMENT:      IMPRESSION: 59 year old male with history of substance abuse with alcohol presenting with altered mental status likely secondary to hepatic encephalopathy and/or hyperammonemia, now with acute decompensation requiring intubation for airway protection and       PLAN: ASSESSMENT: Stefan is a 59 year old male with history of substance abuse with alcohol and significant hepatic failure secondary to acetaminophen overuse and continued alcohol usage now admitted for liver transplant evaluation and altered mental status. Patient recently intubated for airway protection.       IMPRESSION: 59 year old male with history of substance abuse with alcohol presenting with altered mental status likely secondary to hepatic encephalopathy and/or hyperammonemia, now with acute decompensation requiring intubation for airway protection and concern on head imaging for cerebral edema vs. global hypoxic ischemic changes.       PLAN:  [ ]  ASSESSMENT: Stefan is a 59 year old male with history of substance abuse with alcohol and significant hepatic failure secondary to acetaminophen overuse and continued alcohol usage now admitted for liver transplant evaluation and altered mental status. Patient recently intubated for airway protection. Patient's acute neurologic decompensation is likely related to progression of hepatic encephalopathy and hyperammonemia, however it is prudent to rule out infectious etiologies as well for this acute decompensation. With concern for cerebral edema, primary team notified consult neurology team that they will be initiating mannitol--patient to continue to be closely monitored for signs of increasing ICP and neurocritical care team may have to be involved if escalation of increased ICP management is warranted.      IMPRESSION: 59 year old male with history of substance abuse with alcohol presenting with altered mental status likely secondary to hepatic encephalopathy and/or hyperammonemia, now with acute decompensation requiring intubation for airway protection and concern on head imaging for cerebral edema vs. global hypoxic ischemic changes.       PLAN:  [ ] continue to monitor patient on EEG  [ ] Send Vitamin B6/B9/B12, TSH and free T4, [place] virus panel, Lyme serologies, syphilis screen/RPR  [ ] Obtain MRI Head w/ and w/o contrast  [ ] Obtain MRV Head/Neck w/ contrast  [ ] Obtain MRA head/neck w/ and w/o contrast  [ ] Please optimize patient's sedation while on EEG monitoring i.e. avoid sedating with Propofol, Benzodiazepines, and Barbiturates if feasible  Rest of care per primary team     Patient seen with and evaluated by Dr. Flynn, note is not final until addendum is signed by attending.  ASSESSMENT: Stefan is a 59 year old male with history of substance abuse with alcohol and significant hepatic failure secondary to acetaminophen overuse and continued alcohol usage now admitted for liver transplant evaluation and altered mental status. Patient recently intubated for airway protection. Patient's acute neurologic decompensation is likely related to progression of hepatic encephalopathy and hyperammonemia, however it is prudent to rule out infectious etiologies as well for this acute decompensation. With concern for cerebral edema, primary team notified consult neurology team that they will be initiating mannitol--patient to continue to be closely monitored for signs of increasing ICP and neurocritical care team may have to be involved if escalation of increased ICP management is warranted.      IMPRESSION: 59 year old male with history of substance abuse with alcohol presenting with altered mental status likely secondary to hepatic encephalopathy and/or hyperammonemia, now with acute decompensation requiring intubation for airway protection and concern on head imaging for cerebral edema vs. global hypoxic ischemic changes.       PLAN:  [ ] continue to monitor patient on EEG  [ ] Send Vitamin B6/B9/B12, TSH and free T4, [place] virus panel, Lyme serologies, syphilis screen/RPR  [ ] Obtain MRI Head w/ and w/o contrast  [ ] Obtain STAT CT Venogram Head w/ contrast  [ ] Obtain STAT CT Angiogram head/neck w/ and w/o contrast  [ ] Please optimize patient's sedation while on EEG monitoring i.e. avoid sedating with Propofol, Benzodiazepines, and Barbiturates if feasible  Rest of care per primary team     Patient seen with and evaluated by Dr. Flynn, note is not final until addendum is signed by attending.

## 2024-09-17 NOTE — DIETITIAN INITIAL EVALUATION ADULT - PERTINENT LABORATORY DATA
09-17    142  |  106  |  20  ----------------------------<  150<H>  3.2<L>   |  25  |  1.27    Ca    7.6<L>      17 Sep 2024 00:18  Phos  1.8     09-17  Mg     2.4     09-17    TPro  5.0<L>  /  Alb  2.7<L>  /  TBili  7.9<H>  /  DBili  x   /  AST  3712<H>  /  ALT  3122<H>  /  AlkPhos  105  09-17  A1C with Estimated Average Glucose Result: 5.5 % (09-14-24 @ 11:30)  A1C with Estimated Average Glucose Result: 5.4 % (07-09-24 @ 07:05)

## 2024-09-17 NOTE — CONSULT NOTE ADULT - SUBJECTIVE AND OBJECTIVE BOX
Neurology - Consult Note    Spectra: [phone] ([facility])    HPI:  Patient is a 60 y/o male with pmh of ETOH abuse, and gastritis who initially presented to OSH due to concern of abdominal pain. Patient underwent an evaluation for acute cholecystitis and was treated for ETOH withdrawal. Patient presented with elevated LFTs that started to uptrend and patient has now been transferred to [facility] for further evaluation by the Hepatology team. Patient is currently altered and is unable to provide much collateral. Patient was treated with NAC at OSH. (15 Sep 2024 07:47)    Additional HPI: Bedside nurse reports that yesterday patient was verbally responsive but incoherent to prompts, however this morning around 0500 he was no longer verbally responsive. Bedside nurse also reports patient was recently intubated this afternoon due to concerns for inability to maintain airway. No abnormal movements witnessed. No report of excessive drooling or poor secretion control.     Review of Systems:  Unable to obtain, patient is intubated and sedated.    Allergies:  No Known Allergies      PMHx/PSHx/Family Hx: As above, otherwise see below   Alcohol abuse    PUD (peptic ulcer disease)    Mixed hyperlipidemia    EtOH dependence    Gastritis    Substance abuse    DTs (delirium tremens)    HTN (hypertension)    Elevated lipase        Social Hx:  History of alcohol abuse    Medications:  MEDICATIONS  (STANDING):  acetylcysteine IVPB 12 Gram(s) IV Intermittent every 24 hours  cefTRIAXone   IVPB 1000 milliGRAM(s) IV Intermittent every 24 hours  chlorhexidine 0.12% Liquid 15 milliLiter(s) Oral Mucosa every 12 hours  dextrose 5% + lactated ringers. 1000 milliLiter(s) (50 mL/Hr) IV Continuous <Continuous>  folic acid Injectable 1 milliGRAM(s) IV Push daily  lactulose Syrup 30 Gram(s) Oral every 6 hours  mannitol 20% IVPB 80 Gram(s) IV Intermittent once  pantoprazole  Injectable 40 milliGRAM(s) IV Push two times a day  propofol Infusion 20 MICROgram(s)/kG/Min (9.96 mL/Hr) IV Continuous <Continuous>    MEDICATIONS  (PRN):    Vitals:  T(C): 37.1 (09-17-24 @ 12:00), Max: 37.3 (09-17-24 @ 08:00)  HR: 93 (09-17-24 @ 14:00) (93 - 112)  BP: 120/78 (09-17-24 @ 14:00) (112/80 - 201/89)  RR: 20 (09-17-24 @ 14:00) (11 - 22)  SpO2: 100% (09-17-24 @ 14:00) (90% - 100%)    Physical Examination: INCOMPLETE  General - intubated and sedated  HEENT - scleral edema and icterus present bilaterally  Cardiovascular - peripheral pulses palpable but weak, bilateral upper extremity edema    Neurologic Exam: (Limited by sedation with Propofol)  Mental status - No response to sternal rub. No response to verbal commands.     Cranial nerves - Pupils equal round and sluggishly reactive to light bilaterally (5mm -> 3mm b/l), oculocephalic reflex present but weak, no facial asymmetry, absent corneal reflex bilaterally     Motor - Decreased tone throughout.     Strength testing: Unable to perform    Sensation - No response to nailbed pressure in all extremities    DTR's -             Biceps      Triceps     Brachioradialis           Patellar         Ankle         Toes/plantar response  R             2+             0                  2+                    1+               1+                   Mute  L              2+             0                 3+                    1+                1+                   Mute    Negative Mcmahon sign bilaterally      Labs:                    9.1    3.52  )-----------( 65       ( 17 Sep 2024 13:43 )             27.6     09-17    142  |  106  |  20  ----------------------------<  150[H]  3.2[L]   |  25  |  1.27    Ca    7.6[L]      17 Sep 2024 00:18  Phos  1.8     09-17  Mg     2.4     09-17    TPro  5.0[L]  /  Alb  2.7[L]  /  TBili  7.9[H]  /  DBili  x   /  AST  3712[H]  /  ALT  3122[H]  /  AlkPhos  105  09-17    POCT Blood Glucose.: 130 mg/dL (16 Sep 2024 06:05)    LIVER FUNCTIONS - ( 17 Sep 2024 00:18 )  Alb: 2.7 g/dL / Pro: 5.0 g/dL / ALK PHOS: 105 U/L / ALT: 3122 U/L / AST: 3712 U/L / GGT: x             Culture - Blood (collected 14 Sep 2024 17:21)  Source: .Blood Blood-Peripheral  Preliminary Report (16 Sep 2024 22:01):    No growth at 48 Hours    Culture - Blood (collected 14 Sep 2024 17:21)  Source: .Blood Blood-Peripheral  Preliminary Report (16 Sep 2024 22:01):    No growth at 48 Hours    PT/INR - ( 17 Sep 2024 13:43 )   PT: 37.2 sec;   INR: 3.51 ratio    PTT - ( 17 Sep 2024 13:43 )  PTT:33.1 sec        Radiology:  < from: CT Head No Cont (09.17.24 @ 09:09) >  ACC: [id number] EXAM:  CT BRAIN   ORDERED BY:  FERNANDO ENG     PROCEDURE DATE:  09/17/2024      INTERPRETATION:  CLINICAL INDICATIONS:  AMS    COMPARISON: Head CT dated 1/28/2024    TECHNIQUE: Noncontrast CT of the head. Multiplanar reformations are   submitted.    FINDINGS:    Diffuse sulcal effacement and loss of gray-white differentiation as   compared to the prior head CT may suggest cerebral edema versus global   hypoxic ischemic injury.    There is no evidence of mass, mass effect,midline shift or extra-axial   fluid collection. The lateral ventricles and cortical sulci are   age-appropriate in size and configuration. Right-sided approach NG tube.   Mild inflammatory mucosal changes are seen throughout the various   portions of the paranasal sinuses. The orbits and mastoid air cells are   unremarkable. The calvarium is intact. Consider MRI as clinically   warranted.    IMPRESSION: Diffuse sulcal effacement and loss of gray-white   differentiation as compared to the prior head CT may suggest cerebral   edema versus global hypoxic ischemic injury. MRI is recommended for   further evaluation.    --- End of Report --- Neurology - Consult Note    Spectra: [phone] ([facility])    HPI:  Patient is a 60 y/o male with pmh of ETOH abuse, and gastritis who initially presented to OSH due to concern of abdominal pain. Patient underwent an evaluation for acute cholecystitis and was treated for ETOH withdrawal. Patient presented with elevated LFTs that started to uptrend and patient has now been transferred to [facility] for further evaluation by the Hepatology team. Patient is currently altered and is unable to provide much collateral. Patient was treated with NAC at OSH. (15 Sep 2024 07:47)    Additional HPI: Bedside nurse reports that yesterday patient was verbally responsive but incoherent to prompts, however this morning around 0500 he was no longer verbally responsive. Bedside nurse also reports patient was recently intubated this afternoon due to concerns for inability to maintain airway. No abnormal movements witnessed. No report of excessive drooling or poor secretion control.     Review of Systems:  Unable to obtain, patient is intubated and sedated.    Allergies:  No Known Allergies      PMHx/PSHx/Family Hx: As above, otherwise see below   Alcohol abuse    PUD (peptic ulcer disease)    Mixed hyperlipidemia    EtOH dependence    Gastritis    Substance abuse    DTs (delirium tremens)    HTN (hypertension)    Elevated lipase        Social Hx:  History of alcohol abuse    Medications:  MEDICATIONS  (STANDING):  acetylcysteine IVPB 12 Gram(s) IV Intermittent every 24 hours  cefTRIAXone   IVPB 1000 milliGRAM(s) IV Intermittent every 24 hours  chlorhexidine 0.12% Liquid 15 milliLiter(s) Oral Mucosa every 12 hours  dextrose 5% + lactated ringers. 1000 milliLiter(s) (50 mL/Hr) IV Continuous <Continuous>  folic acid Injectable 1 milliGRAM(s) IV Push daily  lactulose Syrup 30 Gram(s) Oral every 6 hours  mannitol 20% IVPB 80 Gram(s) IV Intermittent once  pantoprazole  Injectable 40 milliGRAM(s) IV Push two times a day  propofol Infusion 20 MICROgram(s)/kG/Min (9.96 mL/Hr) IV Continuous <Continuous>    MEDICATIONS  (PRN):    Vitals:  T(C): 37.1 (09-17-24 @ 12:00), Max: 37.3 (09-17-24 @ 08:00)  HR: 93 (09-17-24 @ 14:00) (93 - 112)  BP: 120/78 (09-17-24 @ 14:00) (112/80 - 201/89)  RR: 20 (09-17-24 @ 14:00) (11 - 22)  SpO2: 100% (09-17-24 @ 14:00) (90% - 100%)    Physical Examination:   General - intubated and sedated  HEENT - scleral edema and icterus present bilaterally  Cardiovascular - peripheral pulses palpable but weak, bilateral upper extremity edema    Neurologic Exam: (Limited by sedation with Propofol)  Mental status - No response to sternal rub. No response to verbal commands.     Cranial nerves - Pupils equal round and sluggishly reactive to light bilaterally (5mm -> 3mm b/l), oculocephalic reflex present but weak, no facial asymmetry, absent corneal reflex bilaterally     Motor - Decreased tone throughout.     Strength testing: Unable to perform    Sensation - No response to nailbed pressure in all extremities    DTR's -             Biceps      Triceps     Brachioradialis           Patellar         Ankle         Toes/plantar response  R             2+             0                  2+                    1+               1+                   Mute  L              2+             0                 3+                    1+                1+                   Mute    Negative Mcmahon sign bilaterally      Labs:                    9.1    3.52  )-----------( 65       ( 17 Sep 2024 13:43 )             27.6     09-17    142  |  106  |  20  ----------------------------<  150[H]  3.2[L]   |  25  |  1.27    Ca    7.6[L]      17 Sep 2024 00:18  Phos  1.8     09-17  Mg     2.4     09-17    TPro  5.0[L]  /  Alb  2.7[L]  /  TBili  7.9[H]  /  DBili  x   /  AST  3712[H]  /  ALT  3122[H]  /  AlkPhos  105  09-17    POCT Blood Glucose.: 130 mg/dL (16 Sep 2024 06:05)    LIVER FUNCTIONS - ( 17 Sep 2024 00:18 )  Alb: 2.7 g/dL / Pro: 5.0 g/dL / ALK PHOS: 105 U/L / ALT: 3122 U/L / AST: 3712 U/L / GGT: x             Culture - Blood (collected 14 Sep 2024 17:21)  Source: .Blood Blood-Peripheral  Preliminary Report (16 Sep 2024 22:01):    No growth at 48 Hours    Culture - Blood (collected 14 Sep 2024 17:21)  Source: .Blood Blood-Peripheral  Preliminary Report (16 Sep 2024 22:01):    No growth at 48 Hours    PT/INR - ( 17 Sep 2024 13:43 )   PT: 37.2 sec;   INR: 3.51 ratio    PTT - ( 17 Sep 2024 13:43 )  PTT:33.1 sec    Ammonia, Serum (09.17.24 @ 13:43)   Ammonia, Serum: 227 umol/L    Ceruloplasmin, Serum (09.16.24 @ 00:22)   Ceruloplasmin, Serum: 12 mg/dL    Radiology:  < from: CT Head No Cont (09.17.24 @ 09:09) >  ACC: [id number] EXAM:  CT BRAIN   ORDERED BY:  FERNANDO ENG     PROCEDURE DATE:  09/17/2024      INTERPRETATION:  CLINICAL INDICATIONS:  AMS    COMPARISON: Head CT dated 1/28/2024    TECHNIQUE: Noncontrast CT of the head. Multiplanar reformations are   submitted.    FINDINGS:    Diffuse sulcal effacement and loss of gray-white differentiation as   compared to the prior head CT may suggest cerebral edema versus global   hypoxic ischemic injury.    There is no evidence of mass, mass effect,midline shift or extra-axial   fluid collection. The lateral ventricles and cortical sulci are   age-appropriate in size and configuration. Right-sided approach NG tube.   Mild inflammatory mucosal changes are seen throughout the various   portions of the paranasal sinuses. The orbits and mastoid air cells are   unremarkable. The calvarium is intact. Consider MRI as clinically   warranted.    IMPRESSION: Diffuse sulcal effacement and loss of gray-white   differentiation as compared to the prior head CT may suggest cerebral   edema versus global hypoxic ischemic injury. MRI is recommended for   further evaluation.    --- End of Report --- Neurology - Consult Note    Spectra: [phone] ([facility])    HPI:  Patient is a 58 y/o male with pmh of ETOH abuse, and gastritis who initially presented to OSH due to concern of abdominal pain. Patient underwent an evaluation for acute cholecystitis and was treated for ETOH withdrawal. Patient presented with elevated LFTs that started to uptrend and patient has now been transferred to [facility] for further evaluation by the Hepatology team. Patient is currently altered and is unable to provide much collateral. Patient was treated with NAC at OSH. (15 Sep 2024 07:47)    Additional HPI: Bedside nurse reports that yesterday patient was verbally responsive but incoherent to prompts, however this morning around 0500 he was no longer verbally responsive. Bedside nurse also reports patient was recently intubated this afternoon due to concerns for inability to maintain airway. No abnormal movements witnessed. No report of excessive drooling or poor secretion control.     Review of Systems:  Unable to obtain, patient is intubated and sedated.    Allergies:  No Known Allergies      PMHx/PSHx/Family Hx: As above, otherwise see below   Alcohol abuse    PUD (peptic ulcer disease)    Mixed hyperlipidemia    EtOH dependence    Gastritis    Substance abuse    DTs (delirium tremens)    HTN (hypertension)    Elevated lipase        Social Hx:  History of alcohol abuse    Medications:  MEDICATIONS  (STANDING):  acetylcysteine IVPB 12 Gram(s) IV Intermittent every 24 hours  cefTRIAXone   IVPB 1000 milliGRAM(s) IV Intermittent every 24 hours  chlorhexidine 0.12% Liquid 15 milliLiter(s) Oral Mucosa every 12 hours  dextrose 5% + lactated ringers. 1000 milliLiter(s) (50 mL/Hr) IV Continuous <Continuous>  folic acid Injectable 1 milliGRAM(s) IV Push daily  lactulose Syrup 30 Gram(s) Oral every 6 hours  mannitol 20% IVPB 80 Gram(s) IV Intermittent once  pantoprazole  Injectable 40 milliGRAM(s) IV Push two times a day  propofol Infusion 20 MICROgram(s)/kG/Min (9.96 mL/Hr) IV Continuous <Continuous>    MEDICATIONS  (PRN):    Vitals:  T(C): 37.1 (09-17-24 @ 12:00), Max: 37.3 (09-17-24 @ 08:00)  HR: 93 (09-17-24 @ 14:00) (93 - 112)  BP: 120/78 (09-17-24 @ 14:00) (112/80 - 201/89)  RR: 20 (09-17-24 @ 14:00) (11 - 22)  SpO2: 100% (09-17-24 @ 14:00) (90% - 100%)    Physical Examination:   General - intubated and sedated  HEENT - scleral edema and icterus present bilaterally  Cardiovascular - peripheral pulses palpable but weak, bilateral upper extremity edema    Neurologic Exam: (Limited by sedation with Propofol)  Mental status - No response to sternal rub. No response to verbal commands.     Cranial nerves - Pupils equal round and sluggishly reactive to light bilaterally (5mm -> 3mm b/l), oculocephalic reflex present but weak, no facial asymmetry, absent corneal reflex bilaterally     Motor - Decreased tone throughout.     Strength testing: Unable to perform    Sensation - No response to nailbed pressure in all extremities    DTR's -             Biceps      Triceps     Brachioradialis           Patellar         Ankle         Toes/plantar response  R             2+             0                  2+                    1+               1+                   Mute  L              2+             0                 3+                    1+                1+                   Mute    Negative Mcmahon sign bilaterally      Labs:                    9.1    3.52  )-----------( 65       ( 17 Sep 2024 13:43 )             27.6     09-17    142  |  106  |  20  ----------------------------<  150[H]  3.2[L]   |  25  |  1.27    Ca    7.6[L]      17 Sep 2024 00:18  Phos  1.8     09-17  Mg     2.4     09-17    TPro  5.0[L]  /  Alb  2.7[L]  /  TBili  7.9[H]  /  DBili  x   /  AST  3712[H]  /  ALT  3122[H]  /  AlkPhos  105  09-17    POCT Blood Glucose.: 130 mg/dL (16 Sep 2024 06:05)    LIVER FUNCTIONS - ( 17 Sep 2024 00:18 )  Alb: 2.7 g/dL / Pro: 5.0 g/dL / ALK PHOS: 105 U/L / ALT: 3122 U/L / AST: 3712 U/L / GGT: x             Culture - Blood (collected 14 Sep 2024 17:21)  Source: .Blood Blood-Peripheral  Preliminary Report (16 Sep 2024 22:01):    No growth at 48 Hours    Culture - Blood (collected 14 Sep 2024 17:21)  Source: .Blood Blood-Peripheral  Preliminary Report (16 Sep 2024 22:01):    No growth at 48 Hours    PT/INR - ( 17 Sep 2024 13:43 )   PT: 37.2 sec;   INR: 3.51 ratio    PTT - ( 17 Sep 2024 13:43 )  PTT:33.1 sec    Ammonia, Serum (09.17.24 @ 13:43)   Ammonia, Serum: 227 umol/L    Ceruloplasmin, Serum (09.16.24 @ 00:22)   Ceruloplasmin, Serum: 12 mg/dL    Radiology:  < from: CT Head No Cont (09.17.24 @ 09:09) >  ACC: [id number] EXAM:  CT BRAIN   ORDERED BY:  FERNANDO ENG     PROCEDURE DATE:  09/17/2024      INTERPRETATION:  CLINICAL INDICATIONS:  AMS    COMPARISON: Head CT dated 1/28/2024    TECHNIQUE: Noncontrast CT of the head. Multiplanar reformations are   submitted.    FINDINGS:    Diffuse sulcal effacement and loss of gray-white differentiation as   compared to the prior head CT may suggest cerebral edema versus global   hypoxic ischemic injury.    There is no evidence of mass, mass effect,midline shift or extra-axial   fluid collection. The lateral ventricles and cortical sulci are   age-appropriate in size and configuration. Right-sided approach NG tube.   Mild inflammatory mucosal changes are seen throughout the various   portions of the paranasal sinuses. The orbits and mastoid air cells are   unremarkable. The calvarium is intact. Consider MRI as clinically   warranted.    IMPRESSION: Diffuse sulcal effacement and loss of gray-white   differentiation as compared to the prior head CT may suggest cerebral   edema versus global hypoxic ischemic injury. MRI is recommended for   further evaluation.    --- End of Report ---

## 2024-09-17 NOTE — DIETITIAN INITIAL EVALUATION ADULT - ADD RECOMMEND
As medically feasible, continue to provide thiamine and folic acid + add multivitamin as able. Continue to trend labs, weight, skin integrity, and intake.

## 2024-09-17 NOTE — DIETITIAN INITIAL EVALUATION ADULT - PERTINENT MEDS FT
MEDICATIONS  (STANDING):  acetylcysteine IVPB 12 Gram(s) IV Intermittent every 24 hours  cefTRIAXone   IVPB 1000 milliGRAM(s) IV Intermittent every 24 hours  dextrose 5% + lactated ringers. 1000 milliLiter(s) (50 mL/Hr) IV Continuous <Continuous>  folic acid Injectable 1 milliGRAM(s) IV Push daily  lactulose Retention Enema 200 Gram(s) Rectal every 8 hours  pantoprazole  Injectable 40 milliGRAM(s) IV Push two times a day  phytonadione  IVPB 10 milliGRAM(s) IV Intermittent daily  thiamine IVPB 500 milliGRAM(s) IV Intermittent daily    MEDICATIONS  (PRN):

## 2024-09-17 NOTE — DIETITIAN INITIAL EVALUATION ADULT - NSFNSGIIOFT_GEN_A_CORE
17 Sep 2024 07:01  -  17 Sep 2024 08:53  --------------------------------------------------------  IN:    dextrose 5% + lactated ringers: 100 mL    IV PiggyBack: 88.4 mL  Total IN: 188.4 mL    OUT:  Total OUT: 0 mL    Total NET: 188.4 mL

## 2024-09-17 NOTE — PROGRESS NOTE ADULT - ATTENDING COMMENTS
Patient is a 59 year old M with PMHx ETOH abuse with frequent admissions for withdrawal in the past initially presents to OSH for ETOH withdrawal, Patient with transaminitis, initially mild. Possible episode of hypotension with LFT--> 17K, elevated ammonia, poor mental status.     Patient transferred to [facility] for liver failure and possible transplant evaluation. Reported elevated tylenol level started on NAC. LFTs down trending but will with very high meld, as well as coagulopathy.      LFT improving, coagulopathy also improved. Today with worsening mental status. Less responsive. Ammonia overnight was 80. Having 5-6 BM with lactulose Enemas.     Patient now intubated for worsening MS and airway protection. CT scan showing cerebral edema. Concerning for ischemic injury from hypotension vs cerebral edema from liver failure/ammonia    Pending plasma exchange, mannitol and hypersonic saline. EEG in place, pending MRI once more stable.     # ETOH abuse  # Acute liver failure  # Hepatic encephalopathy  # Coagulopathy  # acute hypoxemic respiratory failure   # cerebral edema  - Acute liver failure, most likely 2/2 to ischemic vs etoh induced. LFT initially > 17K now down trending.   - C/W nac, Repeat Tylenol level WNL. F/U with hep recs  - Trend MELD, LFTs  - Likely not a candidate for Liver transplant  - Unable to pass NGT on multiple passes but multiple different providers. Now able to place post intubation.   -  Now on oral lactulose. Monitor ammonia levels.   - Given cerebral edema, 2/2 to liver failure vs anoxia 2/2 to hypotension. Will plan for PLEX, Mannitol and hypertonic. Will set vent to alkosis  - New O2 requirement, not protecting airway, intubated. F/U post intubation blood gas and xray.   - TEG showing low amplitude, had low fibrinogen. S/P Cryo. Repeat fibrinogen of 100, will give cryo x1 prior to shiely placement.   - DVT ppx- SCD 2/2 to elevated INR  - Dispo- full code.  D/W Son and Daughter, overall poor prognosis given CTH findings. Will have ongoing GOC..

## 2024-09-17 NOTE — DIETITIAN INITIAL EVALUATION ADULT - ENTERAL
Glucerna 1.5 at 10 mL/hr increasing only as tolerated and electrolytes WNL to goal rate 55 mL/hr x24 hours to provide total volume 1320 mL, 1980 kcals, 109 gm protein, and 1002 mL free water. Meets 24 kcals/kG and 1.3 gm protein/kG based on dosing wt 83 kG. 24 hour feeds for refeeding risk.

## 2024-09-17 NOTE — PATIENT PROFILE ADULT - HOW PATIENT ADDRESSED, PROFILE
Patient's visit was conducted through video telecommunication. Patient consented before the start of visit as to understanding of privacy concerns, possible technological failure, and their responsibility of carrying out instructions of plan.
MR Degroot

## 2024-09-17 NOTE — PROCEDURE NOTE - NSBRONCHPROCDETAILS_GEN_A_CORE_FT
bronchoscope was inserted through ETT adaptor. Airways were inspected to the first subsegment. Scant secretions were therapeutically suctioned. Pt tolerated procedure well with no complications.

## 2024-09-17 NOTE — DIETITIAN INITIAL EVALUATION ADULT - DIET TYPE
Medical team to advance diet as able. Consider Low Na. Consistency deferred to speech-language pathology and provider.

## 2024-09-17 NOTE — PROCEDURE NOTE - NSTRACHSECUREAT_RESP_A_CORE
Anemia    Anxiety    Benign prostatic hypertrophy without urinary obstruction    Depression    GERD (gastroesophageal reflux disease)    Hypertension    Kyphosis    Neuropathy    Spinal stenosis lip

## 2024-09-17 NOTE — DIETITIAN INITIAL EVALUATION ADULT - OTHER INFO
Wt Hx:   Dosing wt 83 kG/182.9 lbs. Daily wt in lbs: 194.8 (9/17), 181.8 (9/16)  UBW unknown.    Ht: 72 inches    IBW:    IBW%:   Wt Hx per HIE (lbs):     Nutrition-Related Concerns:   -  Wt Hx:   Dosing wt 83 kG/182.9 lbs. Daily wt in lbs: 194.8 (9/17), 181.8 (9/16)  UBW unknown.    Ht per HIE: 67 inches    IBW: 148 lbs    IBW%: 124%  Wt Hx per HIE (lbs): 181 (11/5/23), 170 (12/17/23), 179 (1/14/24), 174 (3/30/24), 179 (7/4/24)  Wt fluctuations noted; RD will continue to trend as new wts available/able.     Nutrition-Related Concerns:   - ETOH abuse + Acute on chronic decompensated liver failure  -> Ordered for folic acid and thiamine  -> Low K+ and Phos 9/17 - s/p KPhosphate  - ROSA  - On dextrose 5% and lactated ringers at 50 mL/hr  - Elevated BG during admission

## 2024-09-17 NOTE — PROGRESS NOTE ADULT - ASSESSMENT
59 years old male with h/o chronic ETOH abuse and dependence (1 bottle of Vodka a day) with long standing hx of alcohol withdrawal and DTs with frequent hospital/ICU admissions, alcoholic gastritis/esophagitis, PUD, HLD, hx of hypoxic respiratory failure requiring intubation due to aspiration PNA (most recent intubation Aril 2020),  staph PNA, COVID-19 infection Dec 2020 presented with abd pain at the OSH.    #Decompensated alcoholic cirrhosis  #Acute on chronic liver failure  - Calculated MELD 3 score on 9/16 - 34; 9/17 32  - Presented with AMS at OSH. Had multiple hospitalizations in the past for alcohol withdrawal and DT per records. Unclear last alcohol drink. His alcohol level this admission was <10 and Tylenol level was 42 on admission.   - In terms of liver enzymes, patient had normal liver enzymes on 8/2024 when he was admitted for withdrawal with high alcohol level. On 9/13 admission, his AST/ALT 2000/675 with bili 2.8. Now increased AST 43056/ALT 5000s, stable since yesterday. INR has been increasing from 1.25 to 7.25. Patient has received multiple blood products to decrease his INR. Concerned that he might be going into DIC. Initially was hypotensive and required pressors at the OSH. Currently not on pressor support.   - RUQ US: Distended gallbladder with wall thickening up to 5 mm and trace pericholecystic fluid without evidence of cholelithiasis or biliary dilatation. Negative sonographic Gibson's sign.   - CTAP: Nondistended gallbladder with diffuse nonspecific wall edema. Severe fatty liver.  - Could be due to Tylenol use in the setting of underlying liver disease causing acute elevation in his liver enzymes. However, could also be due to ischemia as he was hypotensive at OSH, concerned for possible sepsis. GB distended concerning for acute cholecystitis, no other sources of infection. No ascites seen on imaging.   - Other lab serologies: CMV, EBV PCR neg, Hep B/C neg.   - Unable to place the NGT due to low platelets at this time, has been getting lactulose enema - had 4 BMs overnight, still lethargic with no response to verbal stimuli.   - Other lab serologies: Hep B/C ab    Recommendations:   - Start mannitol for concern for cerebral edema; May need to start CRRT  - Please repeat CBC, CMP, INR and ammonia   - Q4 labs - ammonia, CMP, phos, INR  - Keep MAP >75   - Continue with NAC drip.   - continue with lactulose enema, goal 3-4 BMs per day. Would consider placing an NGT again if possible.    - Patient is unlikely a candidate for LT due to multiple admissions in the past with alcohol withdrawal.   - TEG levels daily and replete appropriately.   - Q6 MELD labs.     All recommendations are tentative until note is attested by an attending.     Miriam Cifuentes MD  Gastroenterology/Hepatology Fellow, PGY-5  Please contact via TEAMS    NON-URGENT CONSULTS:  Please email [email or web address]   59 years old male with h/o chronic ETOH abuse and dependence (1 bottle of Vodka a day) with long standing hx of alcohol withdrawal and DTs with frequent hospital/ICU admissions, alcoholic gastritis/esophagitis, PUD, HLD, hx of hypoxic respiratory failure requiring intubation due to aspiration PNA (most recent intubation Aril 2020),  staph PNA, COVID-19 infection Dec 2020 presented with abd pain at the OSH.    #Decompensated alcoholic cirrhosis  #GUNJAN  - Calculated MELD3 score on 9/16 - 34; 9/17 32  - Presented with AMS at OSH. Had multiple hospitalizations in the past for alcohol withdrawal and DT per records. Unclear last alcohol drink. His alcohol level this admission was <10 and Tylenol level was 42 on admission. Now intubated with findings of cerebral edema and concern for care home.   - Could be due to Tylenol use in the setting of underlying liver disease causing acute elevation in his liver enzymes. However, could also be due to ischemia as he was hypotensive at OSH, concerned for possible sepsis. GB distended concerning for acute cholecystitis, no other sources of infection. No ascites seen on imaging.   - Other lab serologies: CMV, EBV PCR neg, Hep B/C neg.     Recommendations:   - Start mannitol for concern for cerebral edema which is either 2/2 care home or global ischemia 2/2 hypotensive episode at OSH; May need to perform plasma exchange   - Consider CRRT for elevated ammonia and cerebral edema iso GUNJAN   - Please repeat CBC, CMP, INR and ammonia   - Q4 labs - ammonia, CMP, phos, INR  - C/w D5 an dmonitor sugar levels   - Aggressive electrolyte repletion   - Keep MAP >65   - Continue with NAC drip.   - Continue with lactulose enema, goal 3-4 BMs per day. Would consider placing an NGT again if possible.    - Patient is unlikely a candidate for LT due to multiple admissions in the past with alcohol withdrawal.     All recommendations are tentative until note is attested by an attending.     Miriam Cifuentes MD  Gastroenterology/Hepatology Fellow, PGY-5  Please contact via TEAMS    NON-URGENT CONSULTS:  Please email [email or web address]  [email or web address]

## 2024-09-17 NOTE — PROGRESS NOTE ADULT - ATTENDING COMMENTS
59M, severe AUD, drinks 1 bottle of Vodka/day, h/o alcohol withdrawal and DTs with frequent hospital/ICU admissions last 8/21/24 - left AMA, aspiration PNA 2020, staph PNA, COVID-19 infection Dec 2020, peptic ulcer, HLD, initially admitted to an OSH on 9/13/24 with alc withdrawal, N/V, found hypotension sBP 60s mmHg; Bilirubin 2.1, AST/ALT 2100/600, transferred to [facility] on 9/15/24 because of rising LFTs. ED note OSH: no drink in 2d.  In the last 6 months, he had brief admissions in March, June, and August for abdom. pain and alcohol intoxication, and four ED visits for the same.     Course: hepatic encephalopathy, max. AST/ALT 17,577/5,982 (9/15), max. INR 7.31 (9/15), rising bilirubin to 7 on 9/16. Initial -170 in MICU. 9/14: sBP . 9/17: obtunded, CT head showed cerebral edema, NH3 increased to >200.  CT fatty liver, Tylenol lvl 42, started NAC gtt    # acute on chronic liver failure with underlying KRIS/MASH and shock liver, improving LFTs and INR to 4, but cerebral edema due to liver failure given ammonia >200 and no other obvious cause.  - HE, poss. Wernicke encephalopathy, cerebral edema on CT 9/17: obtunded, mild bilat. pupillary dilatation, symmetric  - INR improved further to 4, s/p cryo x2 on 9/15  - ascites: moderate on US  - negative hepatitis A/B/C serologies, EBV PCR, CMV PCR, Tylenol level    Plan:  - IV mannitol, place central line, then plasma exchange for acute liver failure (Stephen, J Hepatology 2016). Exchange of 2 volumes of his plasma is likely helpful and avoids citrate-related hypocalcemia  - Pt. is a poor candidate for liver transplant given frequent alcohol-related hospitalizations, and cannot currently evaluated by social workers  Pt. is still critically ill and prognsis is guarded. Case discussed with MICU attending, Dr. Toure, and again with Dr. Cabrera from liver transplant service.  - continue empiric ceftriaxone, IV thiamin, lactulose, pantoprazole

## 2024-09-17 NOTE — CHART NOTE - NSCHARTNOTEFT_GEN_A_CORE
Initial 60 minutes of record reviewed.  There are no epileptiform abnormalities noted.     Diffuse suppression that is persistent and nonreactive to stimulation which indicates severe diffuse cerebral dysfunction    Full report to follow after review of completed study tomorrow.     BILL Fuentes.  Attending Physician, [facility]      [facility] EEG Reading Room Ph#: [phone]  Epilepsy Answering Service after 5PM and before 8:30AM: Ph#: [phone]

## 2024-09-17 NOTE — PROGRESS NOTE ADULT - SUBJECTIVE AND OBJECTIVE BOX
Progress Note   Miriam Alexandra PGY4- GI/Hep    SUBJECTIVE: Patient seen and examined at bedside.   - CT with findings of cerebral edema   - Patient intubated this AM due to being obtunded     OBJECTIVE:    VITAL SIGNS:  ICU Vital Signs Last 24 Hrs  T(C): 37.1 (17 Sep 2024 12:00), Max: 37.3 (17 Sep 2024 08:00)  T(F): 98.8 (17 Sep 2024 12:00), Max: 99.1 (17 Sep 2024 08:00)  HR: 98 (17 Sep 2024 13:00) (97 - 112)  BP: 129/73 (17 Sep 2024 13:00) (112/80 - 201/89)  BP(mean): 94 (17 Sep 2024 13:00) (91 - 128)  ABP: --  ABP(mean): --  RR: 18 (17 Sep 2024 13:00) (11 - 22)  SpO2: 100% (17 Sep 2024 13:00) (90% - 100%)    O2 Parameters below as of 17 Sep 2024 12:36  Patient On (Oxygen Delivery Method): ventilator    O2 Concentration (%): 40          09-16 @ 07:01  -  09-17 @ 07:00  --------------------------------------------------------  IN: 2880.7 mL / OUT: 1300 mL / NET: 1580.7 mL    09-17 @ 07:01 - 09-17 @ 13:14  --------------------------------------------------------  IN: 821 mL / OUT: 0 mL / NET: 821 mL        PHYSICAL EXAM:  General: NAD  HEENT: NC/AT  Neck: supple  Respiratory: Regular RR, intubated   Cardiovascular: RRR  Abdomen: soft, nondistended  Extremities: WWP, 2+ peripheral pulses b/l  Skin: jaundiced   Neurological: Sedated     MEDICATIONS:  MEDICATIONS  (STANDING):  acetylcysteine IVPB 12 Gram(s) IV Intermittent every 24 hours  cefTRIAXone   IVPB 1000 milliGRAM(s) IV Intermittent every 24 hours  chlorhexidine 0.12% Liquid 15 milliLiter(s) Oral Mucosa every 12 hours  dextrose 5% + lactated ringers. 1000 milliLiter(s) (50 mL/Hr) IV Continuous <Continuous>  fentaNYL    Injectable 100 MICROGram(s) IV Push once  folic acid Injectable 1 milliGRAM(s) IV Push daily  lactulose Syrup 30 Gram(s) Oral every 6 hours  midazolam Injectable 8 milliGRAM(s) IV Push once  pantoprazole  Injectable 40 milliGRAM(s) IV Push two times a day  propofol Infusion 20 MICROgram(s)/kG/Min (9.96 mL/Hr) IV Continuous <Continuous>  propofol Infusion 50 MICROgram(s)/kG/Min (24.9 mL/Hr) IV Continuous <Continuous>  propofol Injectable 5 milliGRAM(s) IV Push once  propofol Injectable 100 milliGRAM(s) IV Push once  rocuronium Injectable 100 milliGRAM(s) IV Push once    MEDICATIONS  (PRN):      ALLERGIES:  Allergies    No Known Allergies    Intolerances        LABS:                        9.3    4.38  )-----------( 62       ( 17 Sep 2024 00:18 )             29.1     09-17    142  |  106  |  20  ----------------------------<  150[H]  3.2[L]   |  25  |  1.27    Ca    7.6[L]      17 Sep 2024 00:18  Phos  1.8     09-17  Mg     2.4     09-17    TPro  5.0[L]  /  Alb  2.7[L]  /  TBili  7.9[H]  /  DBili  x   /  AST  3712[H]  /  ALT  3122[H]  /  AlkPhos  105  09-17    PT/INR - ( 17 Sep 2024 00:18 )   PT: 40.9 sec;   INR: 4.06 ratio         PTT - ( 17 Sep 2024 00:18 )  PTT:34.6 sec  Urinalysis Basic - ( 17 Sep 2024 00:18 )    Color: x / Appearance: x / SG: x / pH: x  Gluc: 150 mg/dL / Ketone: x  / Bili: x / Urobili: x   Blood: x / Protein: x / Nitrite: x   Leuk Esterase: x / RBC: x / WBC x   Sq Epi: x / Non Sq Epi: x / Bacteria: x          ACC: [id number] EXAM:  CT BRAIN   ORDERED BY:  FERNANDO ENG     PROCEDURE DATE:  09/17/2024          INTERPRETATION:  CLINICAL INDICATIONS:  AMS    COMPARISON: Head CT dated 1/28/2024    TECHNIQUE: Noncontrast CT of the head. Multiplanar reformations are   submitted.    FINDINGS:    Diffuse sulcal effacement and loss of gray-white differentiation as   compared to the prior head CT may suggest cerebral edema versus global   hypoxic ischemic injury.    There is no evidence of mass, mass effect,midline shift or extra-axial   fluid collection. The lateral ventricles and cortical sulci are   age-appropriate in size and configuration. Right-sided approach NG tube.   Mild inflammatory mucosal changes are seen throughout the various   portions of the paranasal sinuses. The orbits and mastoid air cells are   unremarkable. The calvarium is intact. Consider MRI as clinically   warranted.    IMPRESSION: Diffuse sulcal effacement and loss of gray-white   differentiation as compared to the prior head CT may suggest cerebral   edema versus global hypoxic ischemic injury. MRI is recommended for   further evaluation.    --- End of Report ---   Progress Note   Miriam Alexandra PGY4- GI/Hep    SUBJECTIVE: Patient seen and examined at bedside.  - Had 5BM overnight    - CT with findings of cerebral edema   - Patient intubated this AM due to being obtunded     OBJECTIVE:    VITAL SIGNS:  ICU Vital Signs Last 24 Hrs  T(C): 37.1 (17 Sep 2024 12:00), Max: 37.3 (17 Sep 2024 08:00)  T(F): 98.8 (17 Sep 2024 12:00), Max: 99.1 (17 Sep 2024 08:00)  HR: 98 (17 Sep 2024 13:00) (97 - 112)  BP: 129/73 (17 Sep 2024 13:00) (112/80 - 201/89)  BP(mean): 94 (17 Sep 2024 13:00) (91 - 128)  ABP: --  ABP(mean): --  RR: 18 (17 Sep 2024 13:00) (11 - 22)  SpO2: 100% (17 Sep 2024 13:00) (90% - 100%)    O2 Parameters below as of 17 Sep 2024 12:36  Patient On (Oxygen Delivery Method): ventilator    O2 Concentration (%): 40          09-16 @ 07:01  -  09-17 @ 07:00  --------------------------------------------------------  IN: 2880.7 mL / OUT: 1300 mL / NET: 1580.7 mL    09-17 @ 07:01 - 09-17 @ 13:14  --------------------------------------------------------  IN: 821 mL / OUT: 0 mL / NET: 821 mL        PHYSICAL EXAM:  General: NAD  HEENT: NC/AT  Neck: supple  Respiratory: Regular RR, intubated   Cardiovascular: RRR  Abdomen: soft, nondistended  Extremities: WWP, 2+ peripheral pulses b/l  Skin: jaundiced   Neurological: Sedated     MEDICATIONS:  MEDICATIONS  (STANDING):  acetylcysteine IVPB 12 Gram(s) IV Intermittent every 24 hours  cefTRIAXone   IVPB 1000 milliGRAM(s) IV Intermittent every 24 hours  chlorhexidine 0.12% Liquid 15 milliLiter(s) Oral Mucosa every 12 hours  dextrose 5% + lactated ringers. 1000 milliLiter(s) (50 mL/Hr) IV Continuous <Continuous>  fentaNYL    Injectable 100 MICROGram(s) IV Push once  folic acid Injectable 1 milliGRAM(s) IV Push daily  lactulose Syrup 30 Gram(s) Oral every 6 hours  midazolam Injectable 8 milliGRAM(s) IV Push once  pantoprazole  Injectable 40 milliGRAM(s) IV Push two times a day  propofol Infusion 20 MICROgram(s)/kG/Min (9.96 mL/Hr) IV Continuous <Continuous>  propofol Infusion 50 MICROgram(s)/kG/Min (24.9 mL/Hr) IV Continuous <Continuous>  propofol Injectable 5 milliGRAM(s) IV Push once  propofol Injectable 100 milliGRAM(s) IV Push once  rocuronium Injectable 100 milliGRAM(s) IV Push once    MEDICATIONS  (PRN):      ALLERGIES:  Allergies    No Known Allergies    Intolerances        LABS:                        9.3    4.38  )-----------( 62       ( 17 Sep 2024 00:18 )             29.1     09-17    142  |  106  |  20  ----------------------------<  150[H]  3.2[L]   |  25  |  1.27    Ca    7.6[L]      17 Sep 2024 00:18  Phos  1.8     09-17  Mg     2.4     09-17    TPro  5.0[L]  /  Alb  2.7[L]  /  TBili  7.9[H]  /  DBili  x   /  AST  3712[H]  /  ALT  3122[H]  /  AlkPhos  105  09-17    PT/INR - ( 17 Sep 2024 00:18 )   PT: 40.9 sec;   INR: 4.06 ratio         PTT - ( 17 Sep 2024 00:18 )  PTT:34.6 sec  Urinalysis Basic - ( 17 Sep 2024 00:18 )    Color: x / Appearance: x / SG: x / pH: x  Gluc: 150 mg/dL / Ketone: x  / Bili: x / Urobili: x   Blood: x / Protein: x / Nitrite: x   Leuk Esterase: x / RBC: x / WBC x   Sq Epi: x / Non Sq Epi: x / Bacteria: x          ACC: [id number] EXAM:  CT BRAIN   ORDERED BY:  FERNANDO ENG     PROCEDURE DATE:  09/17/2024          INTERPRETATION:  CLINICAL INDICATIONS:  AMS    COMPARISON: Head CT dated 1/28/2024    TECHNIQUE: Noncontrast CT of the head. Multiplanar reformations are   submitted.    FINDINGS:    Diffuse sulcal effacement and loss of gray-white differentiation as   compared to the prior head CT may suggest cerebral edema versus global   hypoxic ischemic injury.    There is no evidence of mass, mass effect,midline shift or extra-axial   fluid collection. The lateral ventricles and cortical sulci are   age-appropriate in size and configuration. Right-sided approach NG tube.   Mild inflammatory mucosal changes are seen throughout the various   portions of the paranasal sinuses. The orbits and mastoid air cells are   unremarkable. The calvarium is intact. Consider MRI as clinically   warranted.    IMPRESSION: Diffuse sulcal effacement and loss of gray-white   differentiation as compared to the prior head CT may suggest cerebral   edema versus global hypoxic ischemic injury. MRI is recommended for   further evaluation.    --- End of Report ---

## 2024-09-17 NOTE — PROCEDURE NOTE - NSBRONCHFINDINGS_GEN_A_CORE_FT
scant secretions  Diffuse airway edema and mild erythema  Sharp javon  ETT well positioned 3-4cm from javon

## 2024-09-17 NOTE — PROGRESS NOTE ADULT - ASSESSMENT
Patient is a 59 year old M with PMHx ETOH abuse with frequent admissions for withdrawal & gastritis admitted to ICU for acute on chronic decompensated liver failure & alcohol withdrawal.     # Neuro:  Acute Metabolic Encephalopathy   -Likely secondary to elevated ammonia level along with liver failure.   - Tried multiple times to place NGT but kept coiling- started Lactulose enemas     ETOH Withdawal  -Will stop CIWA and monitor- out of window for DTs    #Resp:   -Saturating well on room air     #CV:  -Patient not on any vasopressors at this time. Hemodynamically stable.     #GI:  // Acute Decompensated Liver Failure - patient with long history of etoh abuse with evidence of hepatic steatosis on imaging.  Appears to be related to a prior ischemic episode   - RUQ US: Distended gallbladder with wall thickening up to 5 mm and trace pericholecystic fluid without evidence of cholelithiasis or biliary dilatation. Negative sonographic Gibson's sign.   - CTAP: Nondistended gallbladder with diffuse nonspecific wall edema. Severe fatty liver.  - labs significant for thrombocytopenia, transaminitis, anemia, elevated bilirubin & decreased fibrinogen, elevated lactate.   Plan  -Continue Thiamine and Folate  -Continue NAC   -Hepatology consulted- ordered Lab work recommended by them   - Will stop steroids- less likely alcohol related       #Renal:  //ROSA   -Pre-renal vs ATN  -Monitor Cr and Urine output  -Renally dose medication        #ID:  - afebrile, wbc nl  -On ceftriaxone for SBP ppx     #Heme:  Concern for DIC   - elevated coags, d-dimer & fibrinogen 40  - S/P 1u cryo & 1 FFP in setting of DIC and liver failure   - FU CBC. tranfuse for Hg <7   -Ordered another unit cryoprecipitate and Q6 DIC labs   -Vitamin K ordered     #Endo:  - cont to monitor FS Q6H while NPO.        Patient is a 59 year old M with PMHx ETOH abuse with frequent admissions for withdrawal & gastritis admitted to ICU for acute on chronic decompensated liver failure & alcohol withdrawal.     # Neuro:  Acute Metabolic Encephalopathy   -Likely secondary to elevated ammonia level along with liver failure.   - Will attempt to place dobhoff NGT  -CT head ordered     ETOH Withdawal  -Will stop CIWA and monitor- out of window for DTs    #Resp:   -Saturating well on room air     #CV:  -Patient not on any vasopressors at this time. Hemodynamically stable.     #GI:  // Acute Decompensated Liver Failure - patient with long history of etoh abuse with evidence of hepatic steatosis on imaging.  Appears to be related to a prior ischemic episode   - RUQ US: Distended gallbladder with wall thickening up to 5 mm and trace pericholecystic fluid without evidence of cholelithiasis or biliary dilatation. Negative sonographic Gibson's sign.   - CTAP: Nondistended gallbladder with diffuse nonspecific wall edema. Severe fatty liver.  - labs significant for thrombocytopenia, transaminitis, anemia, elevated bilirubin & decreased fibrinogen, elevated lactate.   Plan  -Continue Thiamine and Folate  -Continue NAC   -Hepatology consulted- ordered Lab work recommended by them   - Will stop steroids- less likely alcohol related       #Renal:  //ROSA   -Pre-renal vs ATN  -Monitor Cr and Urine output  -Renally dose medication        #ID:  - afebrile, wbc nl  -On ceftriaxone for SBP ppx     #Heme:  Concern for DIC   - elevated coags, d-dimer & fibrinogen 40  - S/P 1u cryo & 1 FFP in setting of DIC and liver failure   - FU CBC. tranfuse for Hg <7   -Ordered another unit cryoprecipitate and Q6 DIC labs   -Vitamin K ordered     #Endo:  - cont to monitor FS Q6H while NPO.        Patient is a 59 year old M with PMHx ETOH abuse with frequent admissions for withdrawal & gastritis admitted to ICU for acute on chronic decompensated liver failure & alcohol withdrawal.     # Neuro:  Acute Metabolic Encephalopathy   -Likely secondary to elevated ammonia level along with liver failure.   -CT head showing concerns for cerebral edema or global hypoxic injury  -Dobhoff NGT placed- will start lactulose orally  -MRI Ordered  -vEEG ordered  -Will consult neurology    ETOH Withdawal  -Will stop CIWA and monitor- out of window for DTs    #Resp:   -Saturating well on room air     #CV:  -Patient not on any vasopressors at this time. Hemodynamically stable.     #GI:  // Acute Decompensated Liver Failure - patient with long history of etoh abuse with evidence of hepatic steatosis on imaging.  Appears to be related to a prior ischemic episode   - RUQ US: Distended gallbladder with wall thickening up to 5 mm and trace pericholecystic fluid without evidence of cholelithiasis or biliary dilatation. Negative sonographic Gibson's sign.   - CTAP: Nondistended gallbladder with diffuse nonspecific wall edema. Severe fatty liver.  - labs significant for thrombocytopenia, transaminitis, anemia, elevated bilirubin & decreased fibrinogen, elevated lactate.   Plan  -Continue Thiamine and Folate  -Continue NAC   -Hepatology consulted- ordered Lab work recommended by them         #Renal:  //ROSA   -Pre-renal vs ATN  -Monitor Cr and Urine output  -Renally dose medication        #ID:  - afebrile, wbc nl  -On ceftriaxone for SBP ppx     #Heme:  Concern for DIC   - elevated coags, d-dimer & fibrinogen 40  - S/P 1u cryo & 1 FFP in setting of DIC and liver failure- fibrinogen still elevated ordered another unit cryo 9/17  - FU CBC. tranfuse for Hg <7   -Ordered another unit cryoprecipitate and Q6 DIC labs   -Vitamin K ordered- will order another 3 dyas of oral for elevated INR     #Endo:  - cont to monitor FS Q6H while NPO.

## 2024-09-17 NOTE — PROGRESS NOTE ADULT - SUBJECTIVE AND OBJECTIVE BOX
INTERVAL HPI/OVERNIGHT EVENTS: No acute events overnight.     SUBJECTIVE: Patient seen and examined at bedside. He remains altered. Not very communicative.       VITAL SIGNS:  ICU Vital Signs Last 24 Hrs  T(C): 37.3 (17 Sep 2024 08:00), Max: 37.3 (17 Sep 2024 08:00)  T(F): 99.1 (17 Sep 2024 08:00), Max: 99.1 (17 Sep 2024 08:00)  HR: 100 (17 Sep 2024 07:00) (100 - 112)  BP: 161/89 (17 Sep 2024 07:00) (112/80 - 167/86)  BP(mean): 115 (17 Sep 2024 07:00) (88 - 118)  ABP: --  ABP(mean): --  RR: 14 (17 Sep 2024 07:00) (12 - 22)  SpO2: 94% (17 Sep 2024 07:00) (90% - 100%)    O2 Parameters below as of 17 Sep 2024 07:00  Patient On (Oxygen Delivery Method): room air            Plateau pressure:   P/F ratio:     09-16 @ 07:01  -  09-17 @ 07:00  --------------------------------------------------------  IN: 2880.7 mL / OUT: 1300 mL / NET: 1580.7 mL    09-17 @ 07:01 - 09-17 @ 07:23  --------------------------------------------------------  IN: 94.2 mL / OUT: 0 mL / NET: 94.2 mL      CAPILLARY BLOOD GLUCOSE      POCT Blood Glucose.: 130 mg/dL (16 Sep 2024 06:05)    ECG:    PHYSICAL EXAM:    General:   GENERAL: NAD  HEAD:  Atraumatic, Normocephalic  EYES: EOMI, PERRLA, conjunctiva and sclera clear  ENT: Moist mucous membranes  NECK: Supple, No elevated JVP.  CHEST/LUNG: Clear to auscultation bilaterally; No rales, rhonchi, or wheezes.   HEART: Regular rate and rhythm; No murmurs, rubs, or gallops  ABDOMEN: Bowel sounds present; Soft, nontender, nondistended  EXTREMITIES:  2+ Peripheral Pulses, brisk capillary refill. No clubbing, cyanosis, or edema  NERVOUS SYSTEM:  A&Ox0, no focal deficits   SKIN: No rashes or lesions    MEDICATIONS:  MEDICATIONS  (STANDING):  acetylcysteine IVPB 12 Gram(s) IV Intermittent every 24 hours  cefTRIAXone   IVPB 1000 milliGRAM(s) IV Intermittent every 24 hours  dextrose 5% + lactated ringers. 1000 milliLiter(s) (50 mL/Hr) IV Continuous <Continuous>  folic acid Injectable 1 milliGRAM(s) IV Push daily  lactulose Retention Enema 200 Gram(s) Rectal every 8 hours  pantoprazole  Injectable 40 milliGRAM(s) IV Push two times a day  phytonadione  IVPB 10 milliGRAM(s) IV Intermittent daily  thiamine IVPB 500 milliGRAM(s) IV Intermittent daily    MEDICATIONS  (PRN):      ALLERGIES:  Allergies    No Known Allergies    Intolerances        LABS:                        9.3    4.38  )-----------( 62       ( 17 Sep 2024 00:18 )             29.1     09-17    142  |  106  |  20  ----------------------------<  150<H>  3.2<L>   |  25  |  1.27    Ca    7.6<L>      17 Sep 2024 00:18  Phos  1.8     09-17  Mg     2.4     09-17    TPro  5.0<L>  /  Alb  2.7<L>  /  TBili  7.9<H>  /  DBili  x   /  AST  3712<H>  /  ALT  3122<H>  /  AlkPhos  105  09-17    PT/INR - ( 17 Sep 2024 00:18 )   PT: 40.9 sec;   INR: 4.06 ratio         PTT - ( 17 Sep 2024 00:18 )  PTT:34.6 sec  Urinalysis Basic - ( 17 Sep 2024 00:18 )    Color: x / Appearance: x / SG: x / pH: x  Gluc: 150 mg/dL / Ketone: x  / Bili: x / Urobili: x   Blood: x / Protein: x / Nitrite: x   Leuk Esterase: x / RBC: x / WBC x   Sq Epi: x / Non Sq Epi: x / Bacteria: x        RADIOLOGY & ADDITIONAL TESTS: Reviewed.   INTERVAL HPI/OVERNIGHT EVENTS: No acute events overnight. Had 5 BMs overnight.     SUBJECTIVE: Patient seen and examined at bedside. He remains altered. Not very communicative.       VITAL SIGNS:  ICU Vital Signs Last 24 Hrs  T(C): 37.3 (17 Sep 2024 08:00), Max: 37.3 (17 Sep 2024 08:00)  T(F): 99.1 (17 Sep 2024 08:00), Max: 99.1 (17 Sep 2024 08:00)  HR: 100 (17 Sep 2024 07:00) (100 - 112)  BP: 161/89 (17 Sep 2024 07:00) (112/80 - 167/86)  BP(mean): 115 (17 Sep 2024 07:00) (88 - 118)  ABP: --  ABP(mean): --  RR: 14 (17 Sep 2024 07:00) (12 - 22)  SpO2: 94% (17 Sep 2024 07:00) (90% - 100%)    O2 Parameters below as of 17 Sep 2024 07:00  Patient On (Oxygen Delivery Method): room air            Plateau pressure:   P/F ratio:     09-16 @ 07:01  -  09-17 @ 07:00  --------------------------------------------------------  IN: 2880.7 mL / OUT: 1300 mL / NET: 1580.7 mL    09-17 @ 07:01 - 09-17 @ 07:23  --------------------------------------------------------  IN: 94.2 mL / OUT: 0 mL / NET: 94.2 mL      CAPILLARY BLOOD GLUCOSE      POCT Blood Glucose.: 130 mg/dL (16 Sep 2024 06:05)    ECG:    PHYSICAL EXAM:    General:   GENERAL: NAD  HEAD:  Atraumatic, Normocephalic  EYES: EOMI, PERRLA, conjunctiva and sclera clear  ENT: Moist mucous membranes  NECK: Supple, No elevated JVP.  CHEST/LUNG: Clear to auscultation bilaterally; No rales, rhonchi, or wheezes.   HEART: Regular rate and rhythm; No murmurs, rubs, or gallops  ABDOMEN: Bowel sounds present; Soft, nontender, nondistended  EXTREMITIES:  2+ Peripheral Pulses, brisk capillary refill. No clubbing, cyanosis, or edema  NERVOUS SYSTEM:  A&Ox0, no focal deficits   SKIN: No rashes or lesions    MEDICATIONS:  MEDICATIONS  (STANDING):  acetylcysteine IVPB 12 Gram(s) IV Intermittent every 24 hours  cefTRIAXone   IVPB 1000 milliGRAM(s) IV Intermittent every 24 hours  dextrose 5% + lactated ringers. 1000 milliLiter(s) (50 mL/Hr) IV Continuous <Continuous>  folic acid Injectable 1 milliGRAM(s) IV Push daily  lactulose Retention Enema 200 Gram(s) Rectal every 8 hours  pantoprazole  Injectable 40 milliGRAM(s) IV Push two times a day  phytonadione  IVPB 10 milliGRAM(s) IV Intermittent daily  thiamine IVPB 500 milliGRAM(s) IV Intermittent daily    MEDICATIONS  (PRN):      ALLERGIES:  Allergies    No Known Allergies    Intolerances        LABS:                        9.3    4.38  )-----------( 62       ( 17 Sep 2024 00:18 )             29.1     09-17    142  |  106  |  20  ----------------------------<  150<H>  3.2<L>   |  25  |  1.27    Ca    7.6<L>      17 Sep 2024 00:18  Phos  1.8     09-17  Mg     2.4     09-17    TPro  5.0<L>  /  Alb  2.7<L>  /  TBili  7.9<H>  /  DBili  x   /  AST  3712<H>  /  ALT  3122<H>  /  AlkPhos  105  09-17    PT/INR - ( 17 Sep 2024 00:18 )   PT: 40.9 sec;   INR: 4.06 ratio         PTT - ( 17 Sep 2024 00:18 )  PTT:34.6 sec  Urinalysis Basic - ( 17 Sep 2024 00:18 )    Color: x / Appearance: x / SG: x / pH: x  Gluc: 150 mg/dL / Ketone: x  / Bili: x / Urobili: x   Blood: x / Protein: x / Nitrite: x   Leuk Esterase: x / RBC: x / WBC x   Sq Epi: x / Non Sq Epi: x / Bacteria: x        RADIOLOGY & ADDITIONAL TESTS: Reviewed.   INTERVAL HPI/OVERNIGHT EVENTS: No acute events overnight. Had 5 BMs overnight.     SUBJECTIVE: Patient seen and examined at bedside. He remains altered. Not very communicative.       VITAL SIGNS:  ICU Vital Signs Last 24 Hrs  T(C): 37.3 (17 Sep 2024 08:00), Max: 37.3 (17 Sep 2024 08:00)  T(F): 99.1 (17 Sep 2024 08:00), Max: 99.1 (17 Sep 2024 08:00)  HR: 100 (17 Sep 2024 07:00) (100 - 112)  BP: 161/89 (17 Sep 2024 07:00) (112/80 - 167/86)  BP(mean): 115 (17 Sep 2024 07:00) (88 - 118)  ABP: --  ABP(mean): --  RR: 14 (17 Sep 2024 07:00) (12 - 22)  SpO2: 94% (17 Sep 2024 07:00) (90% - 100%)    O2 Parameters below as of 17 Sep 2024 07:00  Patient On (Oxygen Delivery Method): room air            Plateau pressure:   P/F ratio:     09-16 @ 07:01  -  09-17 @ 07:00  --------------------------------------------------------  IN: 2880.7 mL / OUT: 1300 mL / NET: 1580.7 mL    09-17 @ 07:01 - 09-17 @ 07:23  --------------------------------------------------------  IN: 94.2 mL / OUT: 0 mL / NET: 94.2 mL      CAPILLARY BLOOD GLUCOSE      POCT Blood Glucose.: 130 mg/dL (16 Sep 2024 06:05)    ECG:    PHYSICAL EXAM:    General:   GENERAL: NAD  HEAD:  Atraumatic, Normocephalic  EYES: EOMI, PERRLA, conjunctiva and sclera clear  ENT: Moist mucous membranes  NECK: Supple, No elevated JVP.  CHEST/LUNG: Clear to auscultation bilaterally; No rales, rhonchi, or wheezes.   HEART: Regular rate and rhythm; No murmurs, rubs, or gallops  ABDOMEN: Bowel sounds present; Soft, nontender, nondistended  EXTREMITIES:  2+ Peripheral Pulses, brisk capillary refill. No clubbing, cyanosis, or edema  NERVOUS SYSTEM:  A&Ox0, not responding to any stimuli   SKIN: No rashes or lesions    MEDICATIONS:  MEDICATIONS  (STANDING):  acetylcysteine IVPB 12 Gram(s) IV Intermittent every 24 hours  cefTRIAXone   IVPB 1000 milliGRAM(s) IV Intermittent every 24 hours  dextrose 5% + lactated ringers. 1000 milliLiter(s) (50 mL/Hr) IV Continuous <Continuous>  folic acid Injectable 1 milliGRAM(s) IV Push daily  lactulose Retention Enema 200 Gram(s) Rectal every 8 hours  pantoprazole  Injectable 40 milliGRAM(s) IV Push two times a day  phytonadione  IVPB 10 milliGRAM(s) IV Intermittent daily  thiamine IVPB 500 milliGRAM(s) IV Intermittent daily    MEDICATIONS  (PRN):      ALLERGIES:  Allergies    No Known Allergies    Intolerances        LABS:                        9.3    4.38  )-----------( 62       ( 17 Sep 2024 00:18 )             29.1     09-17    142  |  106  |  20  ----------------------------<  150<H>  3.2<L>   |  25  |  1.27    Ca    7.6<L>      17 Sep 2024 00:18  Phos  1.8     09-17  Mg     2.4     09-17    TPro  5.0<L>  /  Alb  2.7<L>  /  TBili  7.9<H>  /  DBili  x   /  AST  3712<H>  /  ALT  3122<H>  /  AlkPhos  105  09-17    PT/INR - ( 17 Sep 2024 00:18 )   PT: 40.9 sec;   INR: 4.06 ratio         PTT - ( 17 Sep 2024 00:18 )  PTT:34.6 sec  Urinalysis Basic - ( 17 Sep 2024 00:18 )    Color: x / Appearance: x / SG: x / pH: x  Gluc: 150 mg/dL / Ketone: x  / Bili: x / Urobili: x   Blood: x / Protein: x / Nitrite: x   Leuk Esterase: x / RBC: x / WBC x   Sq Epi: x / Non Sq Epi: x / Bacteria: x        RADIOLOGY & ADDITIONAL TESTS: Reviewed.

## 2024-09-17 NOTE — PATIENT PROFILE ADULT - FALL HARM RISK - HARM RISK INTERVENTIONS

## 2024-09-18 ENCOUNTER — TRANSCRIPTION ENCOUNTER (OUTPATIENT)
Age: 59
End: 2024-09-18

## 2024-09-18 LAB
ALBUMIN SERPL ELPH-MCNC: 2.6 G/DL — LOW (ref 3.3–5)
ALBUMIN SERPL ELPH-MCNC: 2.6 G/DL — LOW (ref 3.3–5)
ALBUMIN SERPL ELPH-MCNC: 2.7 G/DL — LOW (ref 3.3–5)
ALBUMIN SERPL ELPH-MCNC: 2.9 G/DL — LOW (ref 3.3–5)
ALBUMIN SERPL ELPH-MCNC: 3 G/DL — LOW (ref 3.3–5)
ALBUMIN SERPL ELPH-MCNC: 3 G/DL — LOW (ref 3.3–5)
ALP SERPL-CCNC: 60 U/L — SIGNIFICANT CHANGE UP (ref 40–120)
ALP SERPL-CCNC: 62 U/L — SIGNIFICANT CHANGE UP (ref 40–120)
ALP SERPL-CCNC: 65 U/L — SIGNIFICANT CHANGE UP (ref 40–120)
ALP SERPL-CCNC: 65 U/L — SIGNIFICANT CHANGE UP (ref 40–120)
ALP SERPL-CCNC: 66 U/L — SIGNIFICANT CHANGE UP (ref 40–120)
ALP SERPL-CCNC: 98 U/L — SIGNIFICANT CHANGE UP (ref 40–120)
ALT FLD-CCNC: 2063 U/L — HIGH (ref 10–45)
ALT FLD-CCNC: 424 U/L — HIGH (ref 10–45)
ALT FLD-CCNC: 451 U/L — HIGH (ref 10–45)
ALT FLD-CCNC: 499 U/L — HIGH (ref 10–45)
ALT FLD-CCNC: 507 U/L — HIGH (ref 10–45)
ALT FLD-CCNC: 509 U/L — HIGH (ref 10–45)
AMMONIA BLD-MCNC: 108 UMOL/L — HIGH (ref 11–55)
AMMONIA BLD-MCNC: 137 UMOL/L — HIGH (ref 11–55)
AMMONIA BLD-MCNC: 144 UMOL/L — HIGH (ref 11–55)
AMMONIA BLD-MCNC: 145 UMOL/L — HIGH (ref 11–55)
AMMONIA BLD-MCNC: 151 UMOL/L — HIGH (ref 11–55)
AMMONIA BLD-MCNC: 82 UMOL/L — HIGH (ref 11–55)
ANA TITR SER: NEGATIVE — SIGNIFICANT CHANGE UP
ANA TITR SER: NEGATIVE — SIGNIFICANT CHANGE UP
ANION GAP SERPL CALC-SCNC: 11 MMOL/L — SIGNIFICANT CHANGE UP (ref 5–17)
ANION GAP SERPL CALC-SCNC: 15 MMOL/L — SIGNIFICANT CHANGE UP (ref 5–17)
ANION GAP SERPL CALC-SCNC: 16 MMOL/L — SIGNIFICANT CHANGE UP (ref 5–17)
ANION GAP SERPL CALC-SCNC: 17 MMOL/L — SIGNIFICANT CHANGE UP (ref 5–17)
ANION GAP SERPL CALC-SCNC: 18 MMOL/L — HIGH (ref 5–17)
ANION GAP SERPL CALC-SCNC: 19 MMOL/L — HIGH (ref 5–17)
ANISOCYTOSIS BLD QL: SLIGHT — SIGNIFICANT CHANGE UP
APTT BLD: 26 SEC — SIGNIFICANT CHANGE UP (ref 24.5–35.6)
APTT BLD: 26 SEC — SIGNIFICANT CHANGE UP (ref 24.5–35.6)
APTT BLD: 26.2 SEC — SIGNIFICANT CHANGE UP (ref 24.5–35.6)
APTT BLD: 27 SEC — SIGNIFICANT CHANGE UP (ref 24.5–35.6)
APTT BLD: 28.5 SEC — SIGNIFICANT CHANGE UP (ref 24.5–35.6)
APTT BLD: 32 SEC — SIGNIFICANT CHANGE UP (ref 24.5–35.6)
AST SERPL-CCNC: 1304 U/L — HIGH (ref 10–40)
AST SERPL-CCNC: 461 U/L — HIGH (ref 10–40)
AST SERPL-CCNC: 530 U/L — HIGH (ref 10–40)
AST SERPL-CCNC: 598 U/L — HIGH (ref 10–40)
AST SERPL-CCNC: 623 U/L — HIGH (ref 10–40)
AST SERPL-CCNC: 686 U/L — HIGH (ref 10–40)
B BURGDOR C6 AB SER-ACNC: NEGATIVE — SIGNIFICANT CHANGE UP
B BURGDOR IGG+IGM SER-ACNC: 0.09 INDEX — SIGNIFICANT CHANGE UP (ref 0.01–0.9)
BASOPHILS # BLD AUTO: 0 K/UL — SIGNIFICANT CHANGE UP (ref 0–0.2)
BASOPHILS NFR BLD AUTO: 0 % — SIGNIFICANT CHANGE UP (ref 0–2)
BILIRUB SERPL-MCNC: 3.6 MG/DL — HIGH (ref 0.2–1.2)
BILIRUB SERPL-MCNC: 4.2 MG/DL — HIGH (ref 0.2–1.2)
BILIRUB SERPL-MCNC: 5.4 MG/DL — HIGH (ref 0.2–1.2)
BILIRUB SERPL-MCNC: 6 MG/DL — HIGH (ref 0.2–1.2)
BILIRUB SERPL-MCNC: 6.7 MG/DL — HIGH (ref 0.2–1.2)
BILIRUB SERPL-MCNC: 8 MG/DL — HIGH (ref 0.2–1.2)
BLD GP AB SCN SERPL QL: NEGATIVE — SIGNIFICANT CHANGE UP
BUN SERPL-MCNC: 26 MG/DL — HIGH (ref 7–23)
BUN SERPL-MCNC: 26 MG/DL — HIGH (ref 7–23)
BUN SERPL-MCNC: 28 MG/DL — HIGH (ref 7–23)
BUN SERPL-MCNC: 28 MG/DL — HIGH (ref 7–23)
BUN SERPL-MCNC: 29 MG/DL — HIGH (ref 7–23)
BUN SERPL-MCNC: 31 MG/DL — HIGH (ref 7–23)
CA-I BLD-SCNC: 0.95 MMOL/L — LOW (ref 1.15–1.33)
CALCIUM SERPL-MCNC: 7.7 MG/DL — LOW (ref 8.4–10.5)
CALCIUM SERPL-MCNC: 8.1 MG/DL — LOW (ref 8.4–10.5)
CALCIUM SERPL-MCNC: 8.2 MG/DL — LOW (ref 8.4–10.5)
CALCIUM SERPL-MCNC: 8.8 MG/DL — SIGNIFICANT CHANGE UP (ref 8.4–10.5)
CALCIUM SERPL-MCNC: 8.9 MG/DL — SIGNIFICANT CHANGE UP (ref 8.4–10.5)
CALCIUM SERPL-MCNC: 9.1 MG/DL — SIGNIFICANT CHANGE UP (ref 8.4–10.5)
CHLORIDE SERPL-SCNC: 108 MMOL/L — SIGNIFICANT CHANGE UP (ref 96–108)
CHLORIDE SERPL-SCNC: 109 MMOL/L — HIGH (ref 96–108)
CHLORIDE SERPL-SCNC: 111 MMOL/L — HIGH (ref 96–108)
CHLORIDE SERPL-SCNC: 113 MMOL/L — HIGH (ref 96–108)
CO2 SERPL-SCNC: 18 MMOL/L — LOW (ref 22–31)
CO2 SERPL-SCNC: 19 MMOL/L — LOW (ref 22–31)
CO2 SERPL-SCNC: 23 MMOL/L — SIGNIFICANT CHANGE UP (ref 22–31)
CO2 SERPL-SCNC: 23 MMOL/L — SIGNIFICANT CHANGE UP (ref 22–31)
CO2 SERPL-SCNC: 25 MMOL/L — SIGNIFICANT CHANGE UP (ref 22–31)
CO2 SERPL-SCNC: 25 MMOL/L — SIGNIFICANT CHANGE UP (ref 22–31)
CREAT SERPL-MCNC: 1.13 MG/DL — SIGNIFICANT CHANGE UP (ref 0.5–1.3)
CREAT SERPL-MCNC: 1.18 MG/DL — SIGNIFICANT CHANGE UP (ref 0.5–1.3)
CREAT SERPL-MCNC: 1.2 MG/DL — SIGNIFICANT CHANGE UP (ref 0.5–1.3)
CREAT SERPL-MCNC: 1.2 MG/DL — SIGNIFICANT CHANGE UP (ref 0.5–1.3)
CREAT SERPL-MCNC: 1.21 MG/DL — SIGNIFICANT CHANGE UP (ref 0.5–1.3)
CREAT SERPL-MCNC: 1.22 MG/DL — SIGNIFICANT CHANGE UP (ref 0.5–1.3)
D DIMER BLD IA.RAPID-MCNC: HIGH NG/ML DDU
EGFR: 69 ML/MIN/1.73M2 — SIGNIFICANT CHANGE UP
EGFR: 70 ML/MIN/1.73M2 — SIGNIFICANT CHANGE UP
EGFR: 71 ML/MIN/1.73M2 — SIGNIFICANT CHANGE UP
EGFR: 71 ML/MIN/1.73M2 — SIGNIFICANT CHANGE UP
EGFR: 72 ML/MIN/1.73M2 — SIGNIFICANT CHANGE UP
EGFR: 76 ML/MIN/1.73M2 — SIGNIFICANT CHANGE UP
EOSINOPHIL # BLD AUTO: 0 K/UL — SIGNIFICANT CHANGE UP (ref 0–0.5)
EOSINOPHIL # BLD AUTO: 0.01 K/UL — SIGNIFICANT CHANGE UP (ref 0–0.5)
EOSINOPHIL # BLD AUTO: 0.01 K/UL — SIGNIFICANT CHANGE UP (ref 0–0.5)
EOSINOPHIL # BLD AUTO: 0.07 K/UL — SIGNIFICANT CHANGE UP (ref 0–0.5)
EOSINOPHIL # BLD AUTO: 0.1 K/UL — SIGNIFICANT CHANGE UP (ref 0–0.5)
EOSINOPHIL # BLD AUTO: 0.15 K/UL — SIGNIFICANT CHANGE UP (ref 0–0.5)
EOSINOPHIL NFR BLD AUTO: 0 % — SIGNIFICANT CHANGE UP (ref 0–6)
EOSINOPHIL NFR BLD AUTO: 0.3 % — SIGNIFICANT CHANGE UP (ref 0–6)
EOSINOPHIL NFR BLD AUTO: 0.3 % — SIGNIFICANT CHANGE UP (ref 0–6)
EOSINOPHIL NFR BLD AUTO: 1.7 % — SIGNIFICANT CHANGE UP (ref 0–6)
EOSINOPHIL NFR BLD AUTO: 2.8 % — SIGNIFICANT CHANGE UP (ref 0–6)
EOSINOPHIL NFR BLD AUTO: 4.2 % — SIGNIFICANT CHANGE UP (ref 0–6)
FIBRINOGEN PPP-MCNC: 104 MG/DL — LOW (ref 200–445)
FIBRINOGEN PPP-MCNC: 116 MG/DL — LOW (ref 200–445)
FIBRINOGEN PPP-MCNC: 120 MG/DL — LOW (ref 200–445)
FIBRINOGEN PPP-MCNC: 154 MG/DL — LOW (ref 200–445)
FIBRINOGEN PPP-MCNC: 157 MG/DL — LOW (ref 200–445)
FIBRINOGEN PPP-MCNC: 161 MG/DL — LOW (ref 200–445)
FOLATE SERPL-MCNC: 15.1 NG/ML — SIGNIFICANT CHANGE UP
GAS PNL BLDA: SIGNIFICANT CHANGE UP
GAS PNL BLDA: SIGNIFICANT CHANGE UP
GIANT PLATELETS BLD QL SMEAR: PRESENT — SIGNIFICANT CHANGE UP
GLUCOSE BLDC GLUCOMTR-MCNC: 133 MG/DL — HIGH (ref 70–99)
GLUCOSE SERPL-MCNC: 113 MG/DL — HIGH (ref 70–99)
GLUCOSE SERPL-MCNC: 115 MG/DL — HIGH (ref 70–99)
GLUCOSE SERPL-MCNC: 126 MG/DL — HIGH (ref 70–99)
GLUCOSE SERPL-MCNC: 140 MG/DL — HIGH (ref 70–99)
GLUCOSE SERPL-MCNC: 148 MG/DL — HIGH (ref 70–99)
GLUCOSE SERPL-MCNC: 161 MG/DL — HIGH (ref 70–99)
HCT VFR BLD CALC: 24.6 % — LOW (ref 39–50)
HCT VFR BLD CALC: 25.1 % — LOW (ref 39–50)
HCT VFR BLD CALC: 25.2 % — LOW (ref 39–50)
HCT VFR BLD CALC: 25.3 % — LOW (ref 39–50)
HCT VFR BLD CALC: 25.8 % — LOW (ref 39–50)
HCT VFR BLD CALC: 27.6 % — LOW (ref 39–50)
HGB BLD-MCNC: 7.8 G/DL — LOW (ref 13–17)
HGB BLD-MCNC: 7.9 G/DL — LOW (ref 13–17)
HGB BLD-MCNC: 8 G/DL — LOW (ref 13–17)
HGB BLD-MCNC: 8.2 G/DL — LOW (ref 13–17)
HGB BLD-MCNC: 8.3 G/DL — LOW (ref 13–17)
HGB BLD-MCNC: 8.8 G/DL — LOW (ref 13–17)
IMM GRANULOCYTES NFR BLD AUTO: 0.8 % — SIGNIFICANT CHANGE UP (ref 0–0.9)
IMM GRANULOCYTES NFR BLD AUTO: 1 % — HIGH (ref 0–0.9)
IMM GRANULOCYTES NFR BLD AUTO: 1.1 % — HIGH (ref 0–0.9)
IMM GRANULOCYTES NFR BLD AUTO: 2 % — HIGH (ref 0–0.9)
INR BLD: 1.21 RATIO — HIGH (ref 0.85–1.18)
INR BLD: 1.22 RATIO — HIGH (ref 0.85–1.18)
INR BLD: 1.74 RATIO — HIGH (ref 0.85–1.18)
INR BLD: 2.27 RATIO — HIGH (ref 0.85–1.18)
INR BLD: 2.6 RATIO — HIGH (ref 0.85–1.18)
INR BLD: 3.03 RATIO — HIGH (ref 0.85–1.18)
LDH SERPL L TO P-CCNC: 441 U/L — HIGH (ref 50–242)
LYMPHOCYTES # BLD AUTO: 0.5 K/UL — LOW (ref 1–3.3)
LYMPHOCYTES # BLD AUTO: 0.53 K/UL — LOW (ref 1–3.3)
LYMPHOCYTES # BLD AUTO: 0.63 K/UL — LOW (ref 1–3.3)
LYMPHOCYTES # BLD AUTO: 0.72 K/UL — LOW (ref 1–3.3)
LYMPHOCYTES # BLD AUTO: 0.96 K/UL — LOW (ref 1–3.3)
LYMPHOCYTES # BLD AUTO: 1.05 K/UL — SIGNIFICANT CHANGE UP (ref 1–3.3)
LYMPHOCYTES # BLD AUTO: 13.7 % — SIGNIFICANT CHANGE UP (ref 13–44)
LYMPHOCYTES # BLD AUTO: 14.2 % — SIGNIFICANT CHANGE UP (ref 13–44)
LYMPHOCYTES # BLD AUTO: 16 % — SIGNIFICANT CHANGE UP (ref 13–44)
LYMPHOCYTES # BLD AUTO: 19.8 % — SIGNIFICANT CHANGE UP (ref 13–44)
LYMPHOCYTES # BLD AUTO: 24.6 % — SIGNIFICANT CHANGE UP (ref 13–44)
LYMPHOCYTES # BLD AUTO: 27 % — SIGNIFICANT CHANGE UP (ref 13–44)
MACROCYTES BLD QL: SLIGHT — SIGNIFICANT CHANGE UP
MAGNESIUM SERPL-MCNC: 1.9 MG/DL — SIGNIFICANT CHANGE UP (ref 1.6–2.6)
MAGNESIUM SERPL-MCNC: 2.1 MG/DL — SIGNIFICANT CHANGE UP (ref 1.6–2.6)
MAGNESIUM SERPL-MCNC: 2.1 MG/DL — SIGNIFICANT CHANGE UP (ref 1.6–2.6)
MAGNESIUM SERPL-MCNC: 2.2 MG/DL — SIGNIFICANT CHANGE UP (ref 1.6–2.6)
MAGNESIUM SERPL-MCNC: 2.3 MG/DL — SIGNIFICANT CHANGE UP (ref 1.6–2.6)
MAGNESIUM SERPL-MCNC: 2.3 MG/DL — SIGNIFICANT CHANGE UP (ref 1.6–2.6)
MANUAL SMEAR VERIFICATION: SIGNIFICANT CHANGE UP
MANUAL SMEAR VERIFICATION: SIGNIFICANT CHANGE UP
MCHC RBC-ENTMCNC: 23.7 PG — LOW (ref 27–34)
MCHC RBC-ENTMCNC: 23.8 PG — LOW (ref 27–34)
MCHC RBC-ENTMCNC: 23.9 PG — LOW (ref 27–34)
MCHC RBC-ENTMCNC: 24.3 PG — LOW (ref 27–34)
MCHC RBC-ENTMCNC: 31.1 GM/DL — LOW (ref 32–36)
MCHC RBC-ENTMCNC: 31.7 GM/DL — LOW (ref 32–36)
MCHC RBC-ENTMCNC: 31.9 GM/DL — LOW (ref 32–36)
MCHC RBC-ENTMCNC: 32.1 GM/DL — SIGNIFICANT CHANGE UP (ref 32–36)
MCHC RBC-ENTMCNC: 32.2 GM/DL — SIGNIFICANT CHANGE UP (ref 32–36)
MCHC RBC-ENTMCNC: 32.4 GM/DL — SIGNIFICANT CHANGE UP (ref 32–36)
MCV RBC AUTO: 73.1 FL — LOW (ref 80–100)
MCV RBC AUTO: 73.9 FL — LOW (ref 80–100)
MCV RBC AUTO: 74.1 FL — LOW (ref 80–100)
MCV RBC AUTO: 74.6 FL — LOW (ref 80–100)
MCV RBC AUTO: 74.8 FL — LOW (ref 80–100)
MCV RBC AUTO: 78.2 FL — LOW (ref 80–100)
MONOCYTES # BLD AUTO: 0.55 K/UL — SIGNIFICANT CHANGE UP (ref 0–0.9)
MONOCYTES # BLD AUTO: 0.7 K/UL — SIGNIFICANT CHANGE UP (ref 0–0.9)
MONOCYTES # BLD AUTO: 0.78 K/UL — SIGNIFICANT CHANGE UP (ref 0–0.9)
MONOCYTES # BLD AUTO: 0.78 K/UL — SIGNIFICANT CHANGE UP (ref 0–0.9)
MONOCYTES # BLD AUTO: 0.8 K/UL — SIGNIFICANT CHANGE UP (ref 0–0.9)
MONOCYTES # BLD AUTO: 0.88 K/UL — SIGNIFICANT CHANGE UP (ref 0–0.9)
MONOCYTES NFR BLD AUTO: 15.1 % — HIGH (ref 2–14)
MONOCYTES NFR BLD AUTO: 18.4 % — HIGH (ref 2–14)
MONOCYTES NFR BLD AUTO: 19.7 % — HIGH (ref 2–14)
MONOCYTES NFR BLD AUTO: 21.4 % — HIGH (ref 2–14)
MONOCYTES NFR BLD AUTO: 21.4 % — HIGH (ref 2–14)
MONOCYTES NFR BLD AUTO: 22.3 % — HIGH (ref 2–14)
MYELOCYTES NFR BLD: 0.9 % — HIGH (ref 0–0)
NEUTROPHILS # BLD AUTO: 1.68 K/UL — LOW (ref 1.8–7.4)
NEUTROPHILS # BLD AUTO: 2.26 K/UL — SIGNIFICANT CHANGE UP (ref 1.8–7.4)
NEUTROPHILS # BLD AUTO: 2.31 K/UL — SIGNIFICANT CHANGE UP (ref 1.8–7.4)
NEUTROPHILS # BLD AUTO: 2.32 K/UL — SIGNIFICANT CHANGE UP (ref 1.8–7.4)
NEUTROPHILS # BLD AUTO: 2.38 K/UL — SIGNIFICANT CHANGE UP (ref 1.8–7.4)
NEUTROPHILS # BLD AUTO: 2.38 K/UL — SIGNIFICANT CHANGE UP (ref 1.8–7.4)
NEUTROPHILS NFR BLD AUTO: 47.1 % — SIGNIFICANT CHANGE UP (ref 43–77)
NEUTROPHILS NFR BLD AUTO: 54.4 % — SIGNIFICANT CHANGE UP (ref 43–77)
NEUTROPHILS NFR BLD AUTO: 60.4 % — SIGNIFICANT CHANGE UP (ref 43–77)
NEUTROPHILS NFR BLD AUTO: 62.3 % — SIGNIFICANT CHANGE UP (ref 43–77)
NEUTROPHILS NFR BLD AUTO: 63.5 % — SIGNIFICANT CHANGE UP (ref 43–77)
NEUTROPHILS NFR BLD AUTO: 63.6 % — SIGNIFICANT CHANGE UP (ref 43–77)
NRBC # BLD: 0 /100 WBCS — SIGNIFICANT CHANGE UP (ref 0–0)
NRBC # BLD: 1 /100 WBCS — HIGH (ref 0–0)
OVALOCYTES BLD QL SMEAR: SLIGHT — SIGNIFICANT CHANGE UP
PHOSPHATE SERPL-MCNC: 1.8 MG/DL — LOW (ref 2.5–4.5)
PHOSPHATE SERPL-MCNC: 1.9 MG/DL — LOW (ref 2.5–4.5)
PHOSPHATE SERPL-MCNC: 2.4 MG/DL — LOW (ref 2.5–4.5)
PHOSPHATE SERPL-MCNC: 3.3 MG/DL — SIGNIFICANT CHANGE UP (ref 2.5–4.5)
PHOSPHATE SERPL-MCNC: 3.7 MG/DL — SIGNIFICANT CHANGE UP (ref 2.5–4.5)
PHOSPHATE SERPL-MCNC: 3.8 MG/DL — SIGNIFICANT CHANGE UP (ref 2.5–4.5)
PLAT MORPH BLD: ABNORMAL
PLAT MORPH BLD: NORMAL — SIGNIFICANT CHANGE UP
PLATELET # BLD AUTO: 63 K/UL — LOW (ref 150–400)
PLATELET # BLD AUTO: 64 K/UL — LOW (ref 150–400)
PLATELET # BLD AUTO: 67 K/UL — LOW (ref 150–400)
PLATELET # BLD AUTO: 67 K/UL — LOW (ref 150–400)
PLATELET # BLD AUTO: 69 K/UL — LOW (ref 150–400)
PLATELET # BLD AUTO: 70 K/UL — LOW (ref 150–400)
POIKILOCYTOSIS BLD QL AUTO: SLIGHT — SIGNIFICANT CHANGE UP
POTASSIUM SERPL-MCNC: 2.8 MMOL/L — CRITICAL LOW (ref 3.5–5.3)
POTASSIUM SERPL-MCNC: 2.9 MMOL/L — CRITICAL LOW (ref 3.5–5.3)
POTASSIUM SERPL-MCNC: 3.4 MMOL/L — LOW (ref 3.5–5.3)
POTASSIUM SERPL-MCNC: 3.4 MMOL/L — LOW (ref 3.5–5.3)
POTASSIUM SERPL-MCNC: 3.5 MMOL/L — SIGNIFICANT CHANGE UP (ref 3.5–5.3)
POTASSIUM SERPL-MCNC: 3.6 MMOL/L — SIGNIFICANT CHANGE UP (ref 3.5–5.3)
POTASSIUM SERPL-SCNC: 2.8 MMOL/L — CRITICAL LOW (ref 3.5–5.3)
POTASSIUM SERPL-SCNC: 2.9 MMOL/L — CRITICAL LOW (ref 3.5–5.3)
POTASSIUM SERPL-SCNC: 3.4 MMOL/L — LOW (ref 3.5–5.3)
POTASSIUM SERPL-SCNC: 3.4 MMOL/L — LOW (ref 3.5–5.3)
POTASSIUM SERPL-SCNC: 3.5 MMOL/L — SIGNIFICANT CHANGE UP (ref 3.5–5.3)
POTASSIUM SERPL-SCNC: 3.6 MMOL/L — SIGNIFICANT CHANGE UP (ref 3.5–5.3)
PROT SERPL-MCNC: 4.6 G/DL — LOW (ref 6–8.3)
PROT SERPL-MCNC: 4.6 G/DL — LOW (ref 6–8.3)
PROT SERPL-MCNC: 4.9 G/DL — LOW (ref 6–8.3)
PROT SERPL-MCNC: 5 G/DL — LOW (ref 6–8.3)
PROT SERPL-MCNC: 5 G/DL — LOW (ref 6–8.3)
PROT SERPL-MCNC: 5.1 G/DL — LOW (ref 6–8.3)
PROTHROM AB SERPL-ACNC: 13.2 SEC — HIGH (ref 9.5–13)
PROTHROM AB SERPL-ACNC: 13.3 SEC — HIGH (ref 9.5–13)
PROTHROM AB SERPL-ACNC: 18 SEC — HIGH (ref 9.5–13)
PROTHROM AB SERPL-ACNC: 23.3 SEC — HIGH (ref 9.5–13)
PROTHROM AB SERPL-ACNC: 26.6 SEC — HIGH (ref 9.5–13)
PROTHROM AB SERPL-ACNC: 32.3 SEC — HIGH (ref 9.5–13)
RAPIDTEG MAXIMUM AMPLITUDE: 44.5 MM — LOW (ref 52–70)
RBC # BLD: 3.21 M/UL — LOW (ref 4.2–5.8)
RBC # BLD: 3.33 M/UL — LOW (ref 4.2–5.8)
RBC # BLD: 3.38 M/UL — LOW (ref 4.2–5.8)
RBC # BLD: 3.46 M/UL — LOW (ref 4.2–5.8)
RBC # BLD: 3.48 M/UL — LOW (ref 4.2–5.8)
RBC # BLD: 3.69 M/UL — LOW (ref 4.2–5.8)
RBC # FLD: 20.8 % — HIGH (ref 10.3–14.5)
RBC # FLD: 21 % — HIGH (ref 10.3–14.5)
RBC # FLD: 21 % — HIGH (ref 10.3–14.5)
RBC # FLD: 21.2 % — HIGH (ref 10.3–14.5)
RBC # FLD: 21.4 % — HIGH (ref 10.3–14.5)
RBC # FLD: 21.6 % — HIGH (ref 10.3–14.5)
RBC BLD AUTO: ABNORMAL
RBC BLD AUTO: SIGNIFICANT CHANGE UP
RH IG SCN BLD-IMP: POSITIVE — SIGNIFICANT CHANGE UP
SODIUM SERPL-SCNC: 142 MMOL/L — SIGNIFICANT CHANGE UP (ref 135–145)
SODIUM SERPL-SCNC: 146 MMOL/L — HIGH (ref 135–145)
SODIUM SERPL-SCNC: 149 MMOL/L — HIGH (ref 135–145)
SODIUM SERPL-SCNC: 149 MMOL/L — HIGH (ref 135–145)
SODIUM SERPL-SCNC: 150 MMOL/L — HIGH (ref 135–145)
SODIUM SERPL-SCNC: 150 MMOL/L — HIGH (ref 135–145)
T PALLIDUM AB TITR SER: NEGATIVE — SIGNIFICANT CHANGE UP
T4 AB SER-ACNC: 4.8 UG/DL — SIGNIFICANT CHANGE UP (ref 4.6–12)
T4 FREE SERPL-MCNC: 1.5 NG/DL — SIGNIFICANT CHANGE UP (ref 0.9–1.8)
TARGETS BLD QL SMEAR: SLIGHT — SIGNIFICANT CHANGE UP
TEG FUNCTIONAL FIBRINOGEN: 14.5 MM — LOW (ref 15–32)
TEG LY30 (LYSIS): 0 % — SIGNIFICANT CHANGE UP (ref 0–2.6)
TEG REACTION TIME: 4.6 MIN — SIGNIFICANT CHANGE UP (ref 4.6–9.1)
TSH SERPL-MCNC: 0.05 UIU/ML — LOW (ref 0.27–4.2)
VIT B12 SERPL-MCNC: >2000 PG/ML — HIGH (ref 232–1245)
WBC # BLD: 3.56 K/UL — LOW (ref 3.8–10.5)
WBC # BLD: 3.62 K/UL — LOW (ref 3.8–10.5)
WBC # BLD: 3.65 K/UL — LOW (ref 3.8–10.5)
WBC # BLD: 3.74 K/UL — LOW (ref 3.8–10.5)
WBC # BLD: 3.94 K/UL — SIGNIFICANT CHANGE UP (ref 3.8–10.5)
WBC # BLD: 4.25 K/UL — SIGNIFICANT CHANGE UP (ref 3.8–10.5)
WBC # FLD AUTO: 3.56 K/UL — LOW (ref 3.8–10.5)
WBC # FLD AUTO: 3.62 K/UL — LOW (ref 3.8–10.5)
WBC # FLD AUTO: 3.65 K/UL — LOW (ref 3.8–10.5)
WBC # FLD AUTO: 3.74 K/UL — LOW (ref 3.8–10.5)
WBC # FLD AUTO: 3.94 K/UL — SIGNIFICANT CHANGE UP (ref 3.8–10.5)
WBC # FLD AUTO: 4.25 K/UL — SIGNIFICANT CHANGE UP (ref 3.8–10.5)

## 2024-09-18 PROCEDURE — 99291 CRITICAL CARE FIRST HOUR: CPT

## 2024-09-18 PROCEDURE — 95813 EEG EXTND MNTR 61-119 MIN: CPT | Mod: 26

## 2024-09-18 PROCEDURE — 99291 CRITICAL CARE FIRST HOUR: CPT | Mod: GC,25

## 2024-09-18 PROCEDURE — 99233 SBSQ HOSP IP/OBS HIGH 50: CPT

## 2024-09-18 PROCEDURE — 99232 SBSQ HOSP IP/OBS MODERATE 35: CPT | Mod: GC

## 2024-09-18 PROCEDURE — 36620 INSERTION CATHETER ARTERY: CPT

## 2024-09-18 RX ORDER — HYDRALAZINE HYDROCHLORIDE 100 MG/1
5 TABLET ORAL ONCE
Refills: 0 | Status: COMPLETED | OUTPATIENT
Start: 2024-09-18 | End: 2024-09-18

## 2024-09-18 RX ORDER — POTASSIUM PHOSPHATE, MONOBASIC POTASSIUM PHOSPHATE, DIBASIC 224; 236 MG/ML; MG/ML
30 INJECTION, SOLUTION, CONCENTRATE INTRAVENOUS ONCE
Refills: 0 | Status: COMPLETED | OUTPATIENT
Start: 2024-09-18 | End: 2024-09-18

## 2024-09-18 RX ORDER — DIPHENHYDRAMINE HCL 12.5MG/5ML
50 LIQUID (ML) ORAL ONCE
Refills: 0 | Status: COMPLETED | OUTPATIENT
Start: 2024-09-18 | End: 2024-09-18

## 2024-09-18 RX ORDER — DEXMEDETOMIDINE HYDROCHLORIDE IN 0.9% SODIUM CHLORIDE 400 UG/100ML
0.25 INJECTION INTRAVENOUS
Qty: 200 | Refills: 0 | Status: DISCONTINUED | OUTPATIENT
Start: 2024-09-18 | End: 2024-09-18

## 2024-09-18 RX ORDER — LABETALOL HYDROCHLORIDE 200 MG/1
10 TABLET, FILM COATED ORAL ONCE
Refills: 0 | Status: COMPLETED | OUTPATIENT
Start: 2024-09-18 | End: 2024-09-18

## 2024-09-18 RX ORDER — CHLORHEXIDINE GLUCONATE ORAL RINSE 1.2 MG/ML
15 SOLUTION DENTAL EVERY 12 HOURS
Refills: 0 | Status: DISCONTINUED | OUTPATIENT
Start: 2024-09-18 | End: 2024-09-20

## 2024-09-18 RX ORDER — DEXMEDETOMIDINE HYDROCHLORIDE IN 0.9% SODIUM CHLORIDE 400 UG/100ML
0.25 INJECTION INTRAVENOUS
Qty: 400 | Refills: 0 | Status: DISCONTINUED | OUTPATIENT
Start: 2024-09-18 | End: 2024-09-18

## 2024-09-18 RX ORDER — LACTULOSE 10 G/15ML
30 SOLUTION ORAL; RECTAL EVERY 4 HOURS
Refills: 0 | Status: DISCONTINUED | OUTPATIENT
Start: 2024-09-18 | End: 2024-09-22

## 2024-09-18 RX ORDER — DEXMEDETOMIDINE HYDROCHLORIDE IN 0.9% SODIUM CHLORIDE 400 UG/100ML
0.25 INJECTION INTRAVENOUS
Qty: 200 | Refills: 0 | Status: DISCONTINUED | OUTPATIENT
Start: 2024-09-18 | End: 2024-09-19

## 2024-09-18 RX ORDER — ACETYLCYSTEINE
12 POWDER (GRAM) MISCELLANEOUS EVERY 24 HOURS
Refills: 0 | Status: DISCONTINUED | OUTPATIENT
Start: 2024-09-18 | End: 2024-09-20

## 2024-09-18 RX ORDER — HEPARIN SOD,PORCINE/0.9 % NACL 10 UNIT/ML
100 KIT INTRAVENOUS ONCE
Refills: 0 | Status: COMPLETED | OUTPATIENT
Start: 2024-09-18 | End: 2024-09-18

## 2024-09-18 RX ADMIN — Medication 100 MILLIEQUIVALENT(S): at 06:39

## 2024-09-18 RX ADMIN — SODIUM CHLORIDE 30 MILLILITER(S): 5 INJECTION, SOLUTION INTRAVENOUS at 19:47

## 2024-09-18 RX ADMIN — Medication 100 MILLIEQUIVALENT(S): at 07:41

## 2024-09-18 RX ADMIN — Medication 100 MILLIEQUIVALENT(S): at 06:08

## 2024-09-18 RX ADMIN — Medication 100 UNIT(S): at 02:08

## 2024-09-18 RX ADMIN — SODIUM CHLORIDE 30 MILLILITER(S): 5 INJECTION, SOLUTION INTRAVENOUS at 07:41

## 2024-09-18 RX ADMIN — Medication 20 MILLIEQUIVALENT(S): at 21:13

## 2024-09-18 RX ADMIN — Medication 100 MILLIGRAM(S): at 11:44

## 2024-09-18 RX ADMIN — HYDRALAZINE HYDROCHLORIDE 5 MILLIGRAM(S): 100 TABLET ORAL at 07:42

## 2024-09-18 RX ADMIN — LACTULOSE 30 GRAM(S): 10 SOLUTION ORAL; RECTAL at 05:07

## 2024-09-18 RX ADMIN — PANTOPRAZOLE SODIUM 40 MILLIGRAM(S): 40 TABLET, DELAYED RELEASE ORAL at 05:07

## 2024-09-18 RX ADMIN — PANTOPRAZOLE SODIUM 40 MILLIGRAM(S): 40 TABLET, DELAYED RELEASE ORAL at 17:35

## 2024-09-18 RX ADMIN — LACTULOSE 30 GRAM(S): 10 SOLUTION ORAL; RECTAL at 21:14

## 2024-09-18 RX ADMIN — LACTULOSE 30 GRAM(S): 10 SOLUTION ORAL; RECTAL at 11:48

## 2024-09-18 RX ADMIN — POTASSIUM PHOSPHATE, MONOBASIC POTASSIUM PHOSPHATE, DIBASIC 83.33 MILLIMOLE(S): 224; 236 INJECTION, SOLUTION, CONCENTRATE INTRAVENOUS at 06:08

## 2024-09-18 RX ADMIN — DEXMEDETOMIDINE HYDROCHLORIDE IN 0.9% SODIUM CHLORIDE 5.19 MICROGRAM(S)/KG/HR: 400 INJECTION INTRAVENOUS at 19:48

## 2024-09-18 RX ADMIN — Medication 800 GRAM(S): at 14:10

## 2024-09-18 RX ADMIN — PROPOFOL 9.96 MICROGRAM(S)/KG/MIN: 10 INJECTION, EMULSION INTRAVENOUS at 06:41

## 2024-09-18 RX ADMIN — Medication 2 MILLIGRAM(S): at 00:58

## 2024-09-18 RX ADMIN — THIAMINE HYDROCHLORIDE 100 MILLIGRAM(S): 100 INJECTION, SOLUTION INTRAMUSCULAR; INTRAVENOUS at 11:48

## 2024-09-18 RX ADMIN — CHLORHEXIDINE GLUCONATE ORAL RINSE 15 MILLILITER(S): 1.2 SOLUTION DENTAL at 05:07

## 2024-09-18 RX ADMIN — PROPOFOL 9.96 MICROGRAM(S)/KG/MIN: 10 INJECTION, EMULSION INTRAVENOUS at 11:48

## 2024-09-18 RX ADMIN — Medication 44.17 GRAM(S): at 04:27

## 2024-09-18 RX ADMIN — LACTULOSE 30 GRAM(S): 10 SOLUTION ORAL; RECTAL at 14:10

## 2024-09-18 RX ADMIN — CHLORHEXIDINE GLUCONATE ORAL RINSE 1 APPLICATION(S): 1.2 SOLUTION DENTAL at 05:07

## 2024-09-18 RX ADMIN — PROPOFOL 9.96 MICROGRAM(S)/KG/MIN: 10 INJECTION, EMULSION INTRAVENOUS at 07:42

## 2024-09-18 RX ADMIN — Medication 100 MILLIEQUIVALENT(S): at 06:38

## 2024-09-18 RX ADMIN — HYDRALAZINE HYDROCHLORIDE 5 MILLIGRAM(S): 100 TABLET ORAL at 17:35

## 2024-09-18 RX ADMIN — LABETALOL HYDROCHLORIDE 10 MILLIGRAM(S): 200 TABLET, FILM COATED ORAL at 04:09

## 2024-09-18 RX ADMIN — Medication 17 GRAM(S): at 17:35

## 2024-09-18 RX ADMIN — FOLIC ACID 1 MILLIGRAM(S): 1 TABLET ORAL at 11:44

## 2024-09-18 RX ADMIN — LACTULOSE 30 GRAM(S): 10 SOLUTION ORAL; RECTAL at 00:16

## 2024-09-18 RX ADMIN — CHLORHEXIDINE GLUCONATE ORAL RINSE 15 MILLILITER(S): 1.2 SOLUTION DENTAL at 17:35

## 2024-09-18 RX ADMIN — Medication 5 MILLIGRAM(S): at 11:44

## 2024-09-18 RX ADMIN — Medication 100 GRAM(S): at 06:08

## 2024-09-18 RX ADMIN — LACTULOSE 30 GRAM(S): 10 SOLUTION ORAL; RECTAL at 17:35

## 2024-09-18 NOTE — PROGRESS NOTE ADULT - SUBJECTIVE AND OBJECTIVE BOX
NEUROLOGY FOLLOW-UP CONSULT NOTE    RFC: Cerebral edema    Interval history: No acute neurologic events overnight. Patient with ventilator desynchrony overnight and given propofol but stopped at ~08:00 today. Also got Ativan overnight for shaking. Given hypertonic saline and being transitioned to PLEX for hyperammonemia.    Meds:  MEDICATIONS  (STANDING):  acetylcysteine IVPB 12 Gram(s) IV Intermittent every 24 hours  cefTRIAXone   IVPB 1000 milliGRAM(s) IV Intermittent every 24 hours  chlorhexidine 0.12% Liquid 15 milliLiter(s) Oral Mucosa every 12 hours  chlorhexidine 4% Liquid 1 Application(s) Topical <User Schedule>  dexMEDEtomidine Infusion 0.25 MICROgram(s)/kG/Hr (5.19 mL/Hr) IV Continuous <Continuous>  folic acid Injectable 1 milliGRAM(s) IV Push daily  lactulose Syrup 30 Gram(s) Oral every 4 hours  mannitol 20% IVPB 80 Gram(s) IV Intermittent once  pantoprazole  Injectable 40 milliGRAM(s) IV Push two times a day  polyethylene glycol 3350 17 Gram(s) Oral two times a day  propofol Infusion 20 MICROgram(s)/kG/Min (9.96 mL/Hr) IV Continuous <Continuous>  sodium chloride 3%. 500 milliLiter(s) (30 mL/Hr) IV Continuous <Continuous>  thiamine 100 milliGRAM(s) Oral daily    MEDICATIONS  (PRN):    GTT:  None    PMHx/PSHx/FHx/SHx:  Liver failure without hepatic coma    Complex Care    No pertinent family history in first degree relatives (Father, Mother)    No pertinent family history in first degree relatives    No pertinent family history in first degree relatives    FH: HTN (hypertension)    Handoff    Alcohol abuse    PUD (peptic ulcer disease)    Mixed hyperlipidemia    EtOH dependence    Gastritis    Substance abuse    DTs (delirium tremens)    HTN (hypertension)    Elevated lipase    No significant past surgical history    HEPATIC FAILURE, UNSPECIFIED W    [id number]        Allergies:  No Known Allergies      ROS: Due to clinical condition unable to assess (ABBEY)    O:  T(C): 37.6 (09-18-24 @ 12:00), Max: 37.6 (09-18-24 @ 12:00)  HR: 101 (09-18-24 @ 11:00) (83 - 109)  BP: 149/73 (09-18-24 @ 11:00) (120/78 - 206/73)  RR: 10 (09-18-24 @ 11:00) (9 - 26)  SpO2: 100% (09-18-24 @ 11:00) (98% - 100%)    Focused neurologic exam:  MS - Intubated, not sedated, comatose, no speech output, does not follow commands. ABBEY orientation, rep/naming, attn/conc/recent and remote memory/fund of knowledge  CN - PERRL, EOMI by OCR, (+) face sens/str by corneals b/l, (+) spontaneous respirations (patient rate 9 bpm on CPAP), (+) cough. ABBEY VF, hearing, tongue/palate, trap/SCM  Motor - Normal bulk. Mildly inc extensor tone of R side and normal tone of L side. No spontaneous movements although than mild trembling. No grimace, withdrawal, or posturing to strong tactile stimuli in any extremity  Sens - As per Motor above  DTR's - 2+ BUE's, 3+ R KJ, 2+ and brisk L KJ, 4+ AJ b/l (R > L), and neutral b/l plantar response  Coord - ABBEY  Gait and station - ABBEY    Pertinent labs/studies:  CBC with low WNC 3.48, low H/H 8/26 and MCV dec 74, low plt 64  PT/INR inc 18.0/1.74, PTT WNL  BMP with inc Na 149, inc BUN 28, otherwise essentially WNL  Albumin dec 2.9, LFT's with inc ALT//623  Mg WNL, Phos WNL  Ceruloplasmin dec 12, Hep screen (-), A1C 5.5%, UA (-), NH3 inc 227 -> 144, CPK inc 725, TSH dec 0.05, T4 WNL, B12 WNL, folate WNL    vEEG 9/18 -  EEG Classification / Summary:   Abnormal  EEG :   1. Diffuse suppression  Tachycardia     Clinical Impression:  1. Diffuse suppression that is persistent and nonreactive to stimulation which indicates very severe diffuse cerebral dysfunction    There were no seizures or  epileptiform abnormalities recorded.      < from: CT Venogram Brain w/ IV Cont (09.17.24 @ 18:46) >  CT HEAD:  No CT evidence of acute intracranial pathology.    CTA NECK:  No evidence of significant stenosis or occlusion.    The endotracheal tube appears to terminate immediately superior to the   javon.  This may be due to flexion of the head/neck during the CT scan.    Repeat chest radiograph is recommended to confirm proper positioning.    CTA HEAD:  No large vessel occlusion, significant stenosis or vascular abnormality   identified.    CT VENOGRAM:  Focal hypodensity in the right sigmoid sinus, without occlusion, probably   represents streak artifact from overlying EEG leads however nonocclusive   thrombus cannot be excluded.  The right transverse sinus and right   jugular bulb are patent without suspicious filling defect.  Follow-up MR   venogram is recommended for further evaluation    < end of copied text >      < from: CT Head No Cont (09.17.24 @ 09:09) >    Diffuse sulcal effacement and loss of gray-white   differentiation as compared to the prior head CT may suggest cerebral   edema versus global hypoxic ischemic injury. MRI is recommended for   further evaluation.    < end of copied text >

## 2024-09-18 NOTE — PROGRESS NOTE ADULT - SUBJECTIVE AND OBJECTIVE BOX
INTERVAL HPI/OVERNIGHT EVENTS: Patient started on PELX overnight.     SUBJECTIVE: Patient seen and examined at bedside. Remains intubated and sedated.       VITAL SIGNS:  ICU Vital Signs Last 24 Hrs  T(C): 37.5 (18 Sep 2024 04:00), Max: 37.5 (18 Sep 2024 04:00)  T(F): 99.5 (18 Sep 2024 04:00), Max: 99.5 (18 Sep 2024 04:00)  HR: 94 (18 Sep 2024 06:00) (83 - 109)  BP: 193/85 (18 Sep 2024 06:00) (120/78 - 206/73)  BP(mean): 122 (18 Sep 2024 06:00) (94 - 128)  ABP: --  ABP(mean): --  RR: 14 (18 Sep 2024 06:00) (9 - 26)  SpO2: 100% (18 Sep 2024 06:00) (94% - 100%)    O2 Parameters below as of 18 Sep 2024 04:00  Patient On (Oxygen Delivery Method): ventilator    O2 Concentration (%): 30      Mode: CPAP with PS, FiO2: 30, PEEP: 5, PS: 0, MAP: 7  Plateau pressure:   P/F ratio:     09-17 @ 07:01  -  09-18 @ 07:00  --------------------------------------------------------  IN: 4297.1 mL / OUT: 2630 mL / NET: 1667.1 mL      CAPILLARY BLOOD GLUCOSE      POCT Blood Glucose.: 133 mg/dL (18 Sep 2024 06:17)    ECG:    PHYSICAL EXAM:    GENERAL: NAD  HEAD:  Atraumatic, Normocephalic  EYES: EOMI, PERRLA, conjunctiva and sclera clear  ENT: Moist mucous membranes  NECK: Supple, No elevated JVP.  CHEST/LUNG: mechanical ventilation sounds   HEART: Regular rate and rhythm; No murmurs, rubs, or gallops  ABDOMEN: Bowel sounds present; Soft, nontender, nondistended  EXTREMITIES:  2+ Peripheral Pulses, brisk capillary refill. No clubbing, cyanosis, or edema  NERVOUS SYSTEM:  A&Ox0, not responding to any stimuli- sedated  SKIN: No rashes or lesions      MEDICATIONS:  MEDICATIONS  (STANDING):  acetylcysteine IVPB 12 Gram(s) IV Intermittent every 24 hours  cefTRIAXone   IVPB 1000 milliGRAM(s) IV Intermittent every 24 hours  chlorhexidine 0.12% Liquid 15 milliLiter(s) Oral Mucosa every 12 hours  chlorhexidine 4% Liquid 1 Application(s) Topical <User Schedule>  folic acid Injectable 1 milliGRAM(s) IV Push daily  lactulose Syrup 30 Gram(s) Oral every 6 hours  pantoprazole  Injectable 40 milliGRAM(s) IV Push two times a day  phytonadione   Solution 5 milliGRAM(s) Oral daily  potassium chloride  10 mEq/100 mL IVPB 10 milliEquivalent(s) IV Intermittent every 1 hour  propofol Infusion 20 MICROgram(s)/kG/Min (9.96 mL/Hr) IV Continuous <Continuous>  sodium chloride 3%. 500 milliLiter(s) (30 mL/Hr) IV Continuous <Continuous>  thiamine 100 milliGRAM(s) Oral daily    MEDICATIONS  (PRN):      ALLERGIES:  Allergies    No Known Allergies    Intolerances        LABS:                        8.2    3.94  )-----------( 67       ( 18 Sep 2024 05:11 )             25.3     09-18    149[H]  |  108  |  26[H]  ----------------------------<  140[H]  2.8[LL]   |  25  |  1.20    Ca    8.9      18 Sep 2024 05:13  Phos  1.9     09-18  Mg     2.2     09-18    TPro  5.1[L]  /  Alb  3.0[L]  /  TBili  4.2[H]  /  DBili  x   /  AST  686[H]  /  ALT  451[H]  /  AlkPhos  62  09-18    PT/INR - ( 18 Sep 2024 05:11 )   PT: 13.3 sec;   INR: 1.22 ratio         PTT - ( 18 Sep 2024 05:11 )  PTT:27.0 sec  Urinalysis Basic - ( 18 Sep 2024 05:13 )    Color: x / Appearance: x / SG: x / pH: x  Gluc: 140 mg/dL / Ketone: x  / Bili: x / Urobili: x   Blood: x / Protein: x / Nitrite: x   Leuk Esterase: x / RBC: x / WBC x   Sq Epi: x / Non Sq Epi: x / Bacteria: x        RADIOLOGY & ADDITIONAL TESTS: Reviewed. Zyclara Pregnancy And Lactation Text: This medication is Pregnancy Category C. It is unknown if this medication is excreted in breast milk.

## 2024-09-18 NOTE — PROGRESS NOTE ADULT - ASSESSMENT
Patient is a 59 year old M with PMHx ETOH abuse with frequent admissions for withdrawal & gastritis admitted to ICU for acute on chronic decompensated liver failure & alcohol withdrawal. Patient now intubated for airway protection. Started on PLEX for Acute liver failure. Pending further evaluation regarding AMS.    # Neuro:  Acute Metabolic Encephalopathy   -Likely secondary to elevated ammonia level along with liver failure.   -CT head showing concerns for cerebral edema or global hypoxic injury  -Dobhoff NGT placed- will start lactulose orally  -CTA and CTV- appear to have no major findings  Plan  -MRI Ordered  -vEEG started  -Neurology on board  -On PLEX   -On Hypertonic saline for concerns of cerebral edema. S/P Mannitol.     ETOH Withdawal  -Will stop CIWA and monitor- out of window for DTs    #Resp:   -Patient intubated for airway protection.     #CV:  -Patient not on any vasopressors at this time. Hemodynamically stable.     #GI:  // Acute Decompensated Liver Failure - patient with long history of etoh abuse with evidence of hepatic steatosis on imaging.  Appears to be related to a prior ischemic episode   - RUQ US: Distended gallbladder with wall thickening up to 5 mm and trace pericholecystic fluid without evidence of cholelithiasis or biliary dilatation. Negative sonographic Gibson's sign.   - CTAP: Nondistended gallbladder with diffuse nonspecific wall edema. Severe fatty liver.  - labs significant for thrombocytopenia, transaminitis, anemia, elevated bilirubin & decreased fibrinogen, elevated lactate.   Plan  -Continue Thiamine and Folate  -Continue NAC   -On PLEX  -Hepatology consulted- ordered Lab work recommended by them     #Diet  -Started on tube feeds       #Renal:  -No acute issues. Will monitor Cr and urine output.       #ID:  - afebrile, wbc nl  -On ceftriaxone for SBP ppx     #Heme:  Concern for DIC   - elevated coags, d-dimer & fibrinogen 40  - S/P 3u cryo & 1 FFP in setting of DIC and liver failure-  - FU CBC. tranfuse for Hg <7   Plan  -Finished 3 days of IV Vitamin k, now on 3 days of oral Vitamin K    #Endo:  - cont to monitor FS Q6H        Patient is a 59 year old M with PMHx ETOH abuse with frequent admissions for withdrawal & gastritis admitted to ICU for acute on chronic decompensated liver failure & alcohol withdrawal. Patient now intubated for airway protection. Started on PLEX for Acute liver failure. Pending further evaluation regarding AMS.    # Neuro:  Acute Metabolic Encephalopathy   -Likely secondary to elevated ammonia level along with liver failure.   -CT head showing concerns for cerebral edema or global hypoxic injury  -Dobhoff NGT placed- will start lactulose orally  -CTA and CTV- appear to have no major findings  Plan  -MRI Ordered  -vEEG started  -Neurology on board  -On PLEX- got 1st round 9/17  -On Hypertonic saline for concerns of cerebral edema.   -Ordered 2nd dose of mannitol- goal sodium between 145-155     ETOH Withdawal  -Will stop CIWA and monitor- out of window for DTs    #Resp:   -Patient intubated for airway protection.     #CV:  -Patient not on any vasopressors at this time. Hemodynamically stable.     #GI:  // Acute Decompensated Liver Failure - patient with long history of etoh abuse with evidence of hepatic steatosis on imaging.  Appears to be related to a prior ischemic episode   - RUQ US: Distended gallbladder with wall thickening up to 5 mm and trace pericholecystic fluid without evidence of cholelithiasis or biliary dilatation. Negative sonographic Gibson's sign.   - CTAP: Nondistended gallbladder with diffuse nonspecific wall edema. Severe fatty liver.  - labs significant for thrombocytopenia, transaminitis, anemia, elevated bilirubin & decreased fibrinogen, elevated lactate.   Plan  -Continue Thiamine and Folate  -Continue NAC   -On PLEX  -Hepatology consulted- ordered Lab work recommended by them     #Diet  -Started on tube feeds       #Renal:  -No acute issues. Will monitor Cr and urine output.       #ID:  - afebrile, wbc nl  -On ceftriaxone for SBP ppx     #Heme:  Concern for DIC   - elevated coags, d-dimer & fibrinogen 40  - S/P 3u cryo & 1 FFP in setting of DIC and liver failure-  - FU CBC. tranfuse for Hg <7   Fibrinogen now improved  Plan  -Will continue to monitor    #Elevated INR  -INR now improved to 1.74  Plan  -Will stop vitamin K and monitor    #Endo:  - cont to monitor FS Q6H

## 2024-09-18 NOTE — CONSULT NOTE ADULT - SUBJECTIVE AND OBJECTIVE BOX
Patient is a 59y old  Male who presents with a chief complaint of Acute Liver Failure (18 Sep 2024 07:24)    HPI:  Patient is a 58 y/o male with pmh of ETOH abuse, and gastritis who initially presented to OSH due to concern of abdominal pain. Patient underwent an evaluation for acute cholecystitis and was treated for ETOH withdrawal. Patient presented with elevated LFTs that started to uptrend and patient has now been transferred to [facility] for further evaluation by the Hepatology team. Patient is currently altered and is unable to provide much collateral. Patient was treated with NAC at OSH for acetaminophen toxicity.   On morning of 9-17-24 patient had change in mentation requiring CT and MRI brain. No occlusion noted, +cerebral edema/global hypoxic ischemic changes present.   Hepatology discussed with ICU team for trial of PLEX for hyperammonemia induced CT brain changes.  Blood bank was consulted for the same.      PAST MEDICAL & SURGICAL HISTORY: Reviewed      Labs/ Vitals/ Medications reviewed  Parameters below as of 18 Sep 2024 08:00  Patient On (Oxygen Delivery Method): ventilator    O2 Concentration (%): 30                          8.2    3.94  )-----------( 67       ( 18 Sep 2024 05:11 )             25.3       Hematocrit: 25.3 % (09-18 @ 05:11)  Hematocrit: 24.6 % (09-18 @ 04:14)  Hematocrit: 27.6 % (09-18 @ 00:01)  Hematocrit: 27.4 % (09-17 @ 20:44)  Hematocrit: 27.1 % (09-17 @ 17:25)  Hematocrit: 27.6 % (09-17 @ 13:43)  Hematocrit: 29.1 % (09-17 @ 00:18)    09-18    149[H]  |  108  |  26[H]  ----------------------------<  140[H]  2.8[LL]   |  25  |  1.20    Ca    8.9      18 Sep 2024 05:13  Phos  1.9     09-18  Mg     2.2     09-18    TPro  5.1[L]  /  Alb  3.0[L]  /  TBili  4.2[H]  /  DBili  x   /  AST  686[H]  /  ALT  451[H]  /  AlkPhos  62  09-18      Bilirubin Direct: 4.0 mg/dL (09-16 @ 12:09)    Lactate Dehydrogenase, Serum: 441 U/L (09-18 @ 00:01)  Lactate Dehydrogenase, Serum: 460 U/L (09-17 @ 17:25)  Lactate Dehydrogenase, Serum: 680 U/L (09-17 @ 00:18)  Lactate Dehydrogenase, Serum: 3238 U/L (09-16 @ 00:21)    PT/INR - ( 18 Sep 2024 05:11 )   PT: 13.3 sec;   INR: 1.22 ratio    PTT - ( 18 Sep 2024 05:11 )  PTT:27.0 sec

## 2024-09-18 NOTE — PROGRESS NOTE ADULT - ATTENDING COMMENTS
- got mannitol, plasma exchange 1.5 volumes; bilirubin and INR halved; still obtunded, off propofol; Unresponsive, pupils equal.  - WBC 4.3, Hb 8.3, PLT 64, INR 1.74  - Na 149, BUN 28, creatinine 1.21, albumin 2.9  - LFTs bilirubin 3.6 ->5.4/ALP 65, AST//507  - Meds: NaCl 3%, mannitol 80g/400 mL x1 today, lactulose, miralax, propofol, thiamine iv, panto iv bid, diet: glucerna  - A: alcoholic steatohepatitis with multiple ED visits and short hospitalizations this year, ongoing alcohol use, adm. to OSH with confusion, hypotensive sBP 60, transferred with MELD >30; persistent obtundation, found brain edema on CT 9/17; INR improved from >7 to 4 (9/17). Tylenol lvl. very low detectable. Not currently a liver transplant candidate due to severe illness - unable to undergo psychosocial evaluation; recent inability to abstain from alcohol very unfavorable. Gravely ill.   - Plan: repeat PLEX today and tomorrow  - Situation discussed with his wife at bedside, and his daughter on the phone

## 2024-09-18 NOTE — PROCEDURE NOTE - NSASSISTBY_GEN_A_CORE
Pt tested positive for nicotine. Will need to stop using lozenges and retest in 3 months per program guidelines.   Urine nicotine screen/retest order placed  
Myself
Attending
Myself

## 2024-09-18 NOTE — PROGRESS NOTE ADULT - ATTENDING COMMENTS
Patient is a 59 year old M with PMHx ETOH abuse with frequent admissions for withdrawal in the past initially presents to OSH for ETOH withdrawal, Patient with transaminitis, initially mild. Possible episode of hypotension with LFT--> 17K, elevated ammonia, poor mental status.     Patient transferred to [facility] for liver failure and possible transplant evaluation. Reported elevated tylenol level started on NAC. LFTs down trending but will with very high meld, as well as coagulopathy.      LFT improving, coagulopathy also improved. Today with worsening mental status. Less responsive. Ammonia overnight was 80. Having 5-6 BM with lactulose Enemas.     Patient now intubated for worsening MS and airway protection. CT scan showing cerebral edema. Concerning for ischemic injury from hypotension vs cerebral edema from liver failure/ammonia    Pending plasma exchange, mannitol and hypersonic saline. EEG in place, pending MRI.   On mannitol x2 doses. Repeat CT without edema.   On day 2 of PLEX.     # ETOH abuse  # Acute liver failure  # Hepatic encephalopathy  # Coagulopathy  # acute hypoxemic respiratory failure   # cerebral edema  - Acute liver failure, most likely 2/2 to ischemic vs etoh induced. LFT initially > 17K now down trending.   - C/W nac, Repeat Tylenol level WNL. F/U with hep recs  - Trend MELD, LFTs  - Likely not a candidate for Liver transplant  - Unable to pass NGT on multiple passes but multiple different providers. Now able to place post intubation. Was having BM with rectal lactulose.   -  Now on oral lactulose. Monitor ammonia levels. Uptitrate as needed.   - Given cerebral edema, 2/2 to liver failure vs anoxia 2/2 to hypotension. Will plan for PLEX, Mannitol and hypertonic. Will set vent to alkaosis   - EEG without sz, pending MRI. Will wean off propofol to assess for mental status.   - New O2 requirement, not protecting airway, intubated. F/U post intubation. Tolerated PC better, Adjust based on blood gas.   - Caoguloapthy improving s/p cryo.   - DVT ppx- SCD 2/2 to elevated INR  - Dispo- full code.  D/W Son and Daughter, overall poor prognosis given CTH findings. Will have ongoing GOC..

## 2024-09-18 NOTE — PROGRESS NOTE ADULT - ASSESSMENT
59 years old male with h/o chronic ETOH abuse and dependence (1 bottle of Vodka a day) with long standing hx of alcohol withdrawal and DTs with frequent hospital/ICU admissions, alcoholic gastritis/esophagitis, PUD, HLD, hx of hypoxic respiratory failure requiring intubation due to aspiration PNA (most recent intubation Aril 2020),  staph PNA, COVID-19 infection Dec 2020 presented with abd pain at the OSH.    #Decompensated alcoholic cirrhosis  #GUNJAN  - Calculated MELD3 score on 9/16 - 34; 9/17 32  - Presented with AMS at OSH. Had multiple hospitalizations in the past for alcohol withdrawal and DT per records. Unclear last alcohol drink. His alcohol level this admission was <10 and Tylenol level was 42 on admission. Now intubated with findings of cerebral edema and concern for snf.   - Could be due to Tylenol use in the setting of underlying liver disease causing acute elevation in his liver enzymes. However, could also be due to ischemia as he was hypotensive at OSH, concerned for possible sepsis. GB distended concerning for acute cholecystitis, no other sources of infection. No ascites seen on imaging.   - Other lab serologies: CMV, EBV PCR neg, Hep B/C neg.     Recommendations:   - C/w mannitol for concern for cerebral edema which is either 2/2 GUNJAN or global ischemia 2/2 hypotensive episode at OSH; - Would perform second PLEX  - Consider CRRT (Not HD) for elevated ammonia and cerebral edema iso snf   - Q4-6 labs - ammonia, CMP, phos, INR  - C/w D5 and monitor sugar levels   - Aggressive electrolyte repletion   - Keep MAP >65   - Continue with NAC drip  - Continue with lactulose enema, goal 3-4 BMs per day.   - Patient is unlikely a candidate for LT due to multiple admissions in the past with alcohol withdrawal.     All recommendations are tentative until note is attested by an attending.     Miriam Cifuentes MD  Gastroenterology/Hepatology Fellow, PGY-5  Please contact via TEAMS    NON-URGENT CONSULTS:  Please email [email or web address]  [email or web address]

## 2024-09-18 NOTE — PROGRESS NOTE ADULT - SUBJECTIVE AND OBJECTIVE BOX
Progress Note   Miriam Alexandra PGY4- GI/Hep    SUBJECTIVE: Patient seen and examined at bedside.   - Propofol turned off this AM   - Having BMs   - PLEX last night     OBJECTIVE:    VITAL SIGNS:  ICU Vital Signs Last 24 Hrs  T(C): 37.6 (18 Sep 2024 12:00), Max: 37.6 (18 Sep 2024 12:00)  T(F): 99.7 (18 Sep 2024 12:00), Max: 99.7 (18 Sep 2024 12:00)  HR: 105 (18 Sep 2024 13:00) (83 - 105)  BP: 179/81 (18 Sep 2024 13:00) (121/61 - 206/73)  BP(mean): 116 (18 Sep 2024 13:00) (88 - 128)  ABP: --  ABP(mean): --  RR: 11 (18 Sep 2024 13:00) (9 - 26)  SpO2: 97% (18 Sep 2024 13:00) (97% - 100%)    O2 Parameters below as of 18 Sep 2024 08:00  Patient On (Oxygen Delivery Method): ventilator    O2 Concentration (%): 30      Mode: CPAP with PS, FiO2: 30, PEEP: 5, PS: 5, MAP: 8, PIP: 11    09-17 @ 07:01  -  09-18 @ 07:00  --------------------------------------------------------  IN: 4297.1 mL / OUT: 2630 mL / NET: 1667.1 mL    09-18 @ 07:01  -  09-18 @ 14:01  --------------------------------------------------------  IN: 1276.3 mL / OUT: 475 mL / NET: 801.3 mL        PHYSICAL EXAM:  General: NAD  HEENT: NC/AT  Neck: supple  Respiratory: intubated   Cardiovascular: RRR  Abdomen: soft, Distended; +BS x4  Extremities: WWP, 2+ peripheral pulses b/l  Skin: jaundiced; no rash  Neurological: Obtunded     MEDICATIONS:  MEDICATIONS  (STANDING):  acetylcysteine IVPB 12 Gram(s) IV Intermittent every 24 hours  cefTRIAXone   IVPB 1000 milliGRAM(s) IV Intermittent every 24 hours  chlorhexidine 0.12% Liquid 15 milliLiter(s) Oral Mucosa every 12 hours  chlorhexidine 4% Liquid 1 Application(s) Topical <User Schedule>  dexMEDEtomidine Infusion 0.25 MICROgram(s)/kG/Hr (5.19 mL/Hr) IV Continuous <Continuous>  folic acid Injectable 1 milliGRAM(s) IV Push daily  lactulose Syrup 30 Gram(s) Oral every 4 hours  mannitol 20% IVPB 80 Gram(s) IV Intermittent once  pantoprazole  Injectable 40 milliGRAM(s) IV Push two times a day  polyethylene glycol 3350 17 Gram(s) Oral two times a day  propofol Infusion 20 MICROgram(s)/kG/Min (9.96 mL/Hr) IV Continuous <Continuous>  sodium chloride 3%. 500 milliLiter(s) (30 mL/Hr) IV Continuous <Continuous>  thiamine 100 milliGRAM(s) Oral daily    MEDICATIONS  (PRN):      ALLERGIES:  Allergies    No Known Allergies    Intolerances        LABS:                        8.3    4.25  )-----------( 64       ( 18 Sep 2024 09:40 )             25.8     09-18    149[H]  |  111[H]  |  28[H]  ----------------------------<  115[H]  3.6   |  23  |  1.21    Ca    8.8      18 Sep 2024 09:44  Phos  3.7     09-18  Mg     2.1     09-18    TPro  5.0[L]  /  Alb  2.9[L]  /  TBili  5.4[H]  /  DBili  x   /  AST  623[H]  /  ALT  507[H]  /  AlkPhos  65  09-18    PT/INR - ( 18 Sep 2024 09:40 )   PT: 18.0 sec;   INR: 1.74 ratio         PTT - ( 18 Sep 2024 09:40 )  PTT:26.0 sec  Urinalysis Basic - ( 18 Sep 2024 09:44 )    Color: x / Appearance: x / SG: x / pH: x  Gluc: 115 mg/dL / Ketone: x  / Bili: x / Urobili: x   Blood: x / Protein: x / Nitrite: x   Leuk Esterase: x / RBC: x / WBC x   Sq Epi: x / Non Sq Epi: x / Bacteria: x

## 2024-09-18 NOTE — PROGRESS NOTE ADULT - ASSESSMENT
Encephalopathy  EtOH use disorder  Acute cholecystitis and liver failure  HTN  Pancytopenia  Hyperammonemia  Cerebral edema  Hypernatremia    - Etiology for diffuse cerebral edema with above exam is most likely secondary to severe hyperammonemia/hepatic encephalopathy. However, cannot completely exclude other causes such as cerebrovascular, infectious, inflammatory, or other toxic/metabolic. CT head, personally reviewed by me, with diffuse sulcal effacement and blurring of the gray-white junction most consistent with cerebral edema but no hemorrhage. Abnormal movements are posturing and NOT seizures. 9/18 - Hypertonic saline given and being transitioned to PLEX. CTA head/neck and CTV head, personally reviewed by me, largely unrevealing with likely artifact on CTV head (even if non-occlusive venous thrombus found would not be contributing to current clinical picture). EEG with diffuse suppression likely indicating cortical dysfunction and possible death; although cannot exclude lingering effect of sedation this is much less likely  - CTA head/neck w/, CTV head w/o with results as above  - MRI brain w/ and w/o  - vEEG with diffuse suppression and slowing with evidence for focal slowing, epileptiform discharges, or seizures. PLEASE STOP  - Optimize sedation with minimizing propofol, benzodiazepines, or barbiturates (Precedex, fentanyl, or hydromorphone OK)  - Await labs for additional causes with Vitamin D 25 OH, B6, WNV, Lyme, syphilis serology TP  - Above recommendations discussed with MICU team, who verbalized agreement and understanding  - Continue to address above medical problems, as you are doing  - Will continue to follow patient with you

## 2024-09-18 NOTE — CONSULT NOTE ADULT - ASSESSMENT
58 y/o M with EtoH abuse presented with elevated LFTs 2/2 acetaminophen toxicity and ? multifactorial. On 9/17 due to change in mental status, the CT MRI brain showed global hypoxic ischemic changes and cerebral edema, possibly 2/2 hyperammonemia. On mannitol and hypertonic saline.   Blood bank was consulted post shiley placement for PLEX.    Care d/w PICU and apheresis RN. Consent obtained from wife over telephone for PLEX with plasma replacement.     Plan: PLEX #1 on 9/17-9/18 using 1.5 plasma volume replacement (donor plasma). 4g lonnie gluconate given to prevent citrate toxicity effects. Procedure well tolerated.   Post procedure Ionized lonnie requested.    PLEX#2 9/19/24 and PLEX#3 9/20/24  Goal: improvement in LFTs, improvement in mental status. Clinical Evaluation will be done every day before start of the procedure and to assess further continuation.

## 2024-09-18 NOTE — EEG REPORT - RECORDING TECHNIQUE:
Patient complains of hemorrhoids and thinks that is cause of positive result.  Proceed with colonoscopy or GI consult?     Statement Selected

## 2024-09-18 NOTE — EEG REPORT - NS EEG TEXT BOX
VANDANA VILLA [id number]     Study Date: 		09-17-24 (3925)   -  09-18-24(0800)  Duration x Hours:   --------------------------------------------------------------------------------------------------  History:  CC/ HPI Patient is a 59y old  Male who presents with a chief complaint of Acute Liver Failure (18 Sep 2024 08:47)    MEDICATIONS  (STANDING):  acetylcysteine IVPB 12 Gram(s) IV Intermittent every 24 hours  cefTRIAXone   IVPB 1000 milliGRAM(s) IV Intermittent every 24 hours  chlorhexidine 0.12% Liquid 15 milliLiter(s) Oral Mucosa every 12 hours  chlorhexidine 4% Liquid 1 Application(s) Topical <User Schedule>  folic acid Injectable 1 milliGRAM(s) IV Push daily  lactulose Syrup 30 Gram(s) Oral every 6 hours  pantoprazole  Injectable 40 milliGRAM(s) IV Push two times a day  phytonadione   Solution 5 milliGRAM(s) Oral daily  propofol Infusion 20 MICROgram(s)/kG/Min (9.96 mL/Hr) IV Continuous <Continuous>  sodium chloride 3%. 500 milliLiter(s) (30 mL/Hr) IV Continuous <Continuous>  thiamine 100 milliGRAM(s) Oral daily    --------------------------------------------------------------------------------------------------  Study Interpretation:    [[[Abbreviation Key:  PDR=alpha rhythm/posterior dominant rhythm. A-P=anterior posterior gradient.  Amplitude: ‘very low’:<20; ‘low’:20-50; ‘medium’:; ‘high’:>200uV.  Persistence for periodic/rhythmic patterns (% of epoch) ‘rare’:<1%; ‘occasional’:1-10%; ‘frequent’:10-50%; ‘abundant’:50-90%; ‘continuous’:>90%.  Persistence for sporadic discharges: ‘rare’:<1/hr; ‘occasional’:1/min-1/hr; ‘frequent’:>1/min; ‘abundant’:>1/10 sec.  GRDA=generalized rhythmic delta activity; FIRDA=frontal intermittent GRDA; LRDA=lateralized rhythmic delta activity; TIRDA=temporal intermittent rhythmic delta activity;  LPD=PLED=lateralized periodic discharges; GPD=generalized periodic discharges; BiPDs=BiPLEDs=bilateral independent periodic epileptiform discharges; SIRPID=stimulus induced rhythmic, periodic, or ictal appearing discharges; BIRDs=brief potentially ictal rhythmic discharges >4 Hz, lasting .5-10s; PFA=paroxysmal bursts of beta/gamma; LVFA=low voltage fast activity.  Modifiers: +F=with fast component; +S=with spike component; +R=with rhythmic component.  S-B=burst suppression pattern.  Max=maximal. N1-drowsy; N2-stage II sleep; N3-slow wave sleep. SSS/BETS=small sharp spikes/benign epileptiform transients of sleep. HV=hyperventilation; PS=photic stimulation]]]    Daily EEG Visual Analysis    FINDINGS:      Background:  Continuity: discontinuous  Symmetry: symmetric  PDR: absent  Reactivity: absent  Voltage: normal, mostly 20-150uV  Anterior Posterior Gradient: absent  Other background findings: none  Breach: absent    Background Slowing:  Generalized slowing: severe diffuse background slowing and suppression  Focal slowing: none was present.    State Changes:   -Absent    Sporadic Epileptiform Discharges:    None    Rhythmic and Periodic Patterns (RPPs):  None     Electrographic and Electroclinical seizures:  None    Other Clinical Events:  None    Activation Procedures:   -Hyperventilation was not performed.    -Photic stimulation was not performed.    Artifacts:  Intermittent myogenic and movement artifacts were noted.    ECG:  The heart rate on single channel ECG was predominantly between 100-120 BPM.    EEG Classification / Summary:   Abnormal  EEG :   1. Discontinuous, non-reactive background, diffuse suppression.   Tachycardia     Clinical Impression:  1. A severe degree of generalized cerebral dysfunction  There were no seizures or  epileptiform abnormalities recorded.        ***THIS IS A PRELIM READ, ATTENDING ATTESTATION PENDING***     -------------------------------------------------------------------------------------------------------  [facility] EEG Reading Room Ph#: [phone]  Epilepsy Answering Service after 5PM and before 8:30AM: Ph#: [phone]    Gabriel Dash DO   Epilepsy Fellow    VANDANA VILLA [id number]     Study Date: 		09-17-24 ([id number])   -  09-18-24(0800)  Duration: 17hr 55 min  --------------------------------------------------------------------------------------------------  History:  CC/ HPI Patient is a 59y old  Male who presents with a chief complaint of Acute Liver Failure (18 Sep 2024 08:47)    MEDICATIONS  (STANDING):  acetylcysteine IVPB 12 Gram(s) IV Intermittent every 24 hours  cefTRIAXone   IVPB 1000 milliGRAM(s) IV Intermittent every 24 hours  chlorhexidine 0.12% Liquid 15 milliLiter(s) Oral Mucosa every 12 hours  chlorhexidine 4% Liquid 1 Application(s) Topical <User Schedule>  folic acid Injectable 1 milliGRAM(s) IV Push daily  lactulose Syrup 30 Gram(s) Oral every 6 hours  pantoprazole  Injectable 40 milliGRAM(s) IV Push two times a day  phytonadione   Solution 5 milliGRAM(s) Oral daily  propofol Infusion 20 MICROgram(s)/kG/Min (9.96 mL/Hr) IV Continuous <Continuous>  sodium chloride 3%. 500 milliLiter(s) (30 mL/Hr) IV Continuous <Continuous>  thiamine 100 milliGRAM(s) Oral daily    --------------------------------------------------------------------------------------------------  Study Interpretation:    [[[Abbreviation Key:  PDR=alpha rhythm/posterior dominant rhythm. A-P=anterior posterior gradient.  Amplitude: ‘very low’:<20; ‘low’:20-50; ‘medium’:; ‘high’:>200uV.  Persistence for periodic/rhythmic patterns (% of epoch) ‘rare’:<1%; ‘occasional’:1-10%; ‘frequent’:10-50%; ‘abundant’:50-90%; ‘continuous’:>90%.  Persistence for sporadic discharges: ‘rare’:<1/hr; ‘occasional’:1/min-1/hr; ‘frequent’:>1/min; ‘abundant’:>1/10 sec.  GRDA=generalized rhythmic delta activity; FIRDA=frontal intermittent GRDA; LRDA=lateralized rhythmic delta activity; TIRDA=temporal intermittent rhythmic delta activity;  LPD=PLED=lateralized periodic discharges; GPD=generalized periodic discharges; BiPDs=BiPLEDs=bilateral independent periodic epileptiform discharges; SIRPID=stimulus induced rhythmic, periodic, or ictal appearing discharges; BIRDs=brief potentially ictal rhythmic discharges >4 Hz, lasting .5-10s; PFA=paroxysmal bursts of beta/gamma; LVFA=low voltage fast activity.  Modifiers: +F=with fast component; +S=with spike component; +R=with rhythmic component.  S-B=burst suppression pattern.  Max=maximal. N1-drowsy; N2-stage II sleep; N3-slow wave sleep. SSS/BETS=small sharp spikes/benign epileptiform transients of sleep. HV=hyperventilation; PS=photic stimulation]]]    Daily EEG Visual Analysis    FINDINGS:      Background:  Continuity: discontinuous  Symmetry: symmetric  PDR: absent  Reactivity: absent  Voltage: normal, mostly 20-150uV  Anterior Posterior Gradient: absent  Other background findings: none  Breach: absent    Background Slowing:  Generalized slowing: severe diffuse background slowing and suppression  Focal slowing: none was present.    State Changes:   -Absent    Sporadic Epileptiform Discharges:    None    Rhythmic and Periodic Patterns (RPPs):  None     Electrographic and Electroclinical seizures:  None    Other Clinical Events:  None    Activation Procedures:   -Hyperventilation was not performed.    -Photic stimulation was not performed.    Artifacts:  Intermittent myogenic and movement artifacts were noted.    ECG:  The heart rate on single channel ECG was predominantly between 100-120 BPM.    EEG Classification / Summary:   Abnormal  EEG :   1. Discontinuous, non-reactive background, diffuse suppression.   Tachycardia     Clinical Impression:  1. A severe degree of generalized cerebral dysfunction  There were no seizures or  epileptiform abnormalities recorded.        ***THIS IS A PRELIM READ, ATTENDING ATTESTATION PENDING***     -------------------------------------------------------------------------------------------------------  [facility] EEG Reading Room Ph#: [phone]  Epilepsy Answering Service after 5PM and before 8:30AM: Ph#: [phone]    Gabriel Dash DO   Epilepsy Fellow    VANDANA VILLA [id number]     Study Date: 		09-17-24 ([id number])   -  09-18-24(0800)  Duration: 17hr 55 min  --------------------------------------------------------------------------------------------------  History:  CC/ HPI Patient is a 59y old  Male who presents with a chief complaint of Acute Liver Failure (18 Sep 2024 08:47)    MEDICATIONS  (STANDING):  acetylcysteine IVPB 12 Gram(s) IV Intermittent every 24 hours  cefTRIAXone   IVPB 1000 milliGRAM(s) IV Intermittent every 24 hours  chlorhexidine 0.12% Liquid 15 milliLiter(s) Oral Mucosa every 12 hours  chlorhexidine 4% Liquid 1 Application(s) Topical <User Schedule>  folic acid Injectable 1 milliGRAM(s) IV Push daily  lactulose Syrup 30 Gram(s) Oral every 6 hours  pantoprazole  Injectable 40 milliGRAM(s) IV Push two times a day  phytonadione   Solution 5 milliGRAM(s) Oral daily  propofol Infusion 20 MICROgram(s)/kG/Min (9.96 mL/Hr) IV Continuous <Continuous>  sodium chloride 3%. 500 milliLiter(s) (30 mL/Hr) IV Continuous <Continuous>  thiamine 100 milliGRAM(s) Oral daily    --------------------------------------------------------------------------------------------------  Study Interpretation:    [[[Abbreviation Key:  PDR=alpha rhythm/posterior dominant rhythm. A-P=anterior posterior gradient.  Amplitude: ‘very low’:<20; ‘low’:20-50; ‘medium’:; ‘high’:>200uV.  Persistence for periodic/rhythmic patterns (% of epoch) ‘rare’:<1%; ‘occasional’:1-10%; ‘frequent’:10-50%; ‘abundant’:50-90%; ‘continuous’:>90%.  Persistence for sporadic discharges: ‘rare’:<1/hr; ‘occasional’:1/min-1/hr; ‘frequent’:>1/min; ‘abundant’:>1/10 sec.  GRDA=generalized rhythmic delta activity; FIRDA=frontal intermittent GRDA; LRDA=lateralized rhythmic delta activity; TIRDA=temporal intermittent rhythmic delta activity;  LPD=PLED=lateralized periodic discharges; GPD=generalized periodic discharges; BiPDs=BiPLEDs=bilateral independent periodic epileptiform discharges; SIRPID=stimulus induced rhythmic, periodic, or ictal appearing discharges; BIRDs=brief potentially ictal rhythmic discharges >4 Hz, lasting .5-10s; PFA=paroxysmal bursts of beta/gamma; LVFA=low voltage fast activity.  Modifiers: +F=with fast component; +S=with spike component; +R=with rhythmic component.  S-B=burst suppression pattern.  Max=maximal. N1-drowsy; N2-stage II sleep; N3-slow wave sleep. SSS/BETS=small sharp spikes/benign epileptiform transients of sleep. HV=hyperventilation; PS=photic stimulation]]]    Daily EEG Visual Analysis    FINDINGS:      Background:  Continuity: diffuse suppression  Symmetry: symmetric  PDR: absent  Reactivity: absent  Voltage: suppressed ( amplitude < 10 uV )  Anterior Posterior Gradient: absent  Other background findings: none  Breach: absent    Background Slowing:  Generalized slowing: diffuse suppression  Focal slowing: none was present.    State Changes:   -Absent    Sporadic Epileptiform Discharges:    None    Rhythmic and Periodic Patterns (RPPs):  None     Electrographic and Electroclinical seizures:  None    Other Clinical Events:  None    Activation Procedures:   -Hyperventilation was not performed.    -Photic stimulation was not performed.    Artifacts:  Intermittent myogenic and movement artifacts were noted.    ECG:  The heart rate on single channel ECG was predominantly between 100-120 BPM.    EEG Classification / Summary:   Abnormal  EEG :   1. Diffuse suppression  Tachycardia     Clinical Impression:  1. Diffuse suppression that is persistent and nonreactive to stimulation which indicates very severe diffuse cerebral dysfunction    There were no seizures or  epileptiform abnormalities recorded.            -------------------------------------------------------------------------------------------------------  [facility] EEG Reading Room Ph#: [phone]  Epilepsy Answering Service after 5PM and before 8:30AM: Ph#: [phone]    Gabriel Dash DO   Epilepsy Fellow     BILL Fuentes  Attending Physician, [facility] Epilepsy [place]    ------------------------------------  EEG Reading Room: [phone]  On Call Service After Hours: [phone]

## 2024-09-19 ENCOUNTER — TRANSCRIPTION ENCOUNTER (OUTPATIENT)
Age: 59
End: 2024-09-19

## 2024-09-19 DIAGNOSIS — T39.1X1A POISONING BY 4-AMINOPHENOL DERIVATIVES, ACCIDENTAL (UNINTENTIONAL), INITIAL ENCOUNTER: ICD-10-CM

## 2024-09-19 LAB
ALBUMIN SERPL ELPH-MCNC: 2.8 G/DL — LOW (ref 3.3–5)
ALBUMIN SERPL ELPH-MCNC: 2.8 G/DL — LOW (ref 3.3–5)
ALBUMIN SERPL ELPH-MCNC: 3.1 G/DL — LOW (ref 3.3–5)
ALP SERPL-CCNC: 61 U/L — SIGNIFICANT CHANGE UP (ref 40–120)
ALP SERPL-CCNC: 71 U/L — SIGNIFICANT CHANGE UP (ref 40–120)
ALP SERPL-CCNC: 75 U/L — SIGNIFICANT CHANGE UP (ref 40–120)
ALPHA-1-FETOPROTEIN-L3: SIGNIFICANT CHANGE UP % (ref 0–9.9)
ALPHA-1-FETOPROTEIN: 0.4 NG/ML — SIGNIFICANT CHANGE UP (ref 0–8.4)
ALT FLD-CCNC: 141 U/L — HIGH (ref 10–45)
ALT FLD-CCNC: 401 U/L — HIGH (ref 10–45)
ALT FLD-CCNC: 528 U/L — HIGH (ref 10–45)
AMMONIA BLD-MCNC: 146 UMOL/L — HIGH (ref 11–55)
AMMONIA BLD-MCNC: 68 UMOL/L — HIGH (ref 11–55)
AMMONIA BLD-MCNC: 94 UMOL/L — HIGH (ref 11–55)
ANION GAP SERPL CALC-SCNC: 14 MMOL/L — SIGNIFICANT CHANGE UP (ref 5–17)
ANION GAP SERPL CALC-SCNC: 17 MMOL/L — SIGNIFICANT CHANGE UP (ref 5–17)
ANION GAP SERPL CALC-SCNC: 17 MMOL/L — SIGNIFICANT CHANGE UP (ref 5–17)
ANISOCYTOSIS BLD QL: SIGNIFICANT CHANGE UP
APTT BLD: 25.4 SEC — SIGNIFICANT CHANGE UP (ref 24.5–35.6)
APTT BLD: 26.4 SEC — SIGNIFICANT CHANGE UP (ref 24.5–35.6)
APTT BLD: 27.1 SEC — SIGNIFICANT CHANGE UP (ref 24.5–35.6)
AST SERPL-CCNC: 136 U/L — HIGH (ref 10–40)
AST SERPL-CCNC: 241 U/L — HIGH (ref 10–40)
AST SERPL-CCNC: 406 U/L — HIGH (ref 10–40)
BASOPHILS # BLD AUTO: 0 K/UL — SIGNIFICANT CHANGE UP (ref 0–0.2)
BASOPHILS # BLD AUTO: 0 K/UL — SIGNIFICANT CHANGE UP (ref 0–0.2)
BASOPHILS # BLD AUTO: 0.02 K/UL — SIGNIFICANT CHANGE UP (ref 0–0.2)
BASOPHILS NFR BLD AUTO: 0 % — SIGNIFICANT CHANGE UP (ref 0–2)
BASOPHILS NFR BLD AUTO: 0 % — SIGNIFICANT CHANGE UP (ref 0–2)
BASOPHILS NFR BLD AUTO: 0.3 % — SIGNIFICANT CHANGE UP (ref 0–2)
BILIRUB SERPL-MCNC: 4.7 MG/DL — HIGH (ref 0.2–1.2)
BILIRUB SERPL-MCNC: 6.4 MG/DL — HIGH (ref 0.2–1.2)
BILIRUB SERPL-MCNC: 7.6 MG/DL — HIGH (ref 0.2–1.2)
BUN SERPL-MCNC: 35 MG/DL — HIGH (ref 7–23)
BUN SERPL-MCNC: 35 MG/DL — HIGH (ref 7–23)
BUN SERPL-MCNC: 38 MG/DL — HIGH (ref 7–23)
CALCIUM SERPL-MCNC: 8.4 MG/DL — SIGNIFICANT CHANGE UP (ref 8.4–10.5)
CALCIUM SERPL-MCNC: 9.1 MG/DL — SIGNIFICANT CHANGE UP (ref 8.4–10.5)
CALCIUM SERPL-MCNC: 9.5 MG/DL — SIGNIFICANT CHANGE UP (ref 8.4–10.5)
CHLORIDE SERPL-SCNC: 117 MMOL/L — HIGH (ref 96–108)
CHLORIDE SERPL-SCNC: 122 MMOL/L — HIGH (ref 96–108)
CHLORIDE SERPL-SCNC: 122 MMOL/L — HIGH (ref 96–108)
CO2 SERPL-SCNC: 18 MMOL/L — LOW (ref 22–31)
CO2 SERPL-SCNC: 20 MMOL/L — LOW (ref 22–31)
CO2 SERPL-SCNC: 26 MMOL/L — SIGNIFICANT CHANGE UP (ref 22–31)
CREAT SERPL-MCNC: 1.09 MG/DL — SIGNIFICANT CHANGE UP (ref 0.5–1.3)
CREAT SERPL-MCNC: 1.15 MG/DL — SIGNIFICANT CHANGE UP (ref 0.5–1.3)
CREAT SERPL-MCNC: 1.17 MG/DL — SIGNIFICANT CHANGE UP (ref 0.5–1.3)
CULTURE RESULTS: SIGNIFICANT CHANGE UP
CULTURE RESULTS: SIGNIFICANT CHANGE UP
D DIMER BLD IA.RAPID-MCNC: 5038 NG/ML DDU — HIGH
D DIMER BLD IA.RAPID-MCNC: HIGH NG/ML DDU
EGFR: 73 ML/MIN/1.73M2 — SIGNIFICANT CHANGE UP
EGFR: 74 ML/MIN/1.73M2 — SIGNIFICANT CHANGE UP
EGFR: 79 ML/MIN/1.73M2 — SIGNIFICANT CHANGE UP
ELLIPTOCYTES BLD QL SMEAR: SLIGHT — SIGNIFICANT CHANGE UP
EOSINOPHIL # BLD AUTO: 0.19 K/UL — SIGNIFICANT CHANGE UP (ref 0–0.5)
EOSINOPHIL # BLD AUTO: 0.52 K/UL — HIGH (ref 0–0.5)
EOSINOPHIL # BLD AUTO: 0.59 K/UL — HIGH (ref 0–0.5)
EOSINOPHIL NFR BLD AUTO: 15.5 % — HIGH (ref 0–6)
EOSINOPHIL NFR BLD AUTO: 5.4 % — SIGNIFICANT CHANGE UP (ref 0–6)
EOSINOPHIL NFR BLD AUTO: 8.4 % — HIGH (ref 0–6)
FIBRINOGEN PPP-MCNC: 110 MG/DL — LOW (ref 200–445)
FIBRINOGEN PPP-MCNC: 117 MG/DL — LOW (ref 200–445)
FIBRINOGEN PPP-MCNC: 175 MG/DL — LOW (ref 200–445)
GAS PNL BLDA: SIGNIFICANT CHANGE UP
GIANT PLATELETS BLD QL SMEAR: PRESENT — SIGNIFICANT CHANGE UP
GLUCOSE BLDC GLUCOMTR-MCNC: 142 MG/DL — HIGH (ref 70–99)
GLUCOSE SERPL-MCNC: 134 MG/DL — HIGH (ref 70–99)
GLUCOSE SERPL-MCNC: 150 MG/DL — HIGH (ref 70–99)
GLUCOSE SERPL-MCNC: 98 MG/DL — SIGNIFICANT CHANGE UP (ref 70–99)
HCT VFR BLD CALC: 25.7 % — LOW (ref 39–50)
HCT VFR BLD CALC: 26.6 % — LOW (ref 39–50)
HCT VFR BLD CALC: 26.6 % — LOW (ref 39–50)
HGB BLD-MCNC: 8.1 G/DL — LOW (ref 13–17)
HGB BLD-MCNC: 8.3 G/DL — LOW (ref 13–17)
HGB BLD-MCNC: 8.4 G/DL — LOW (ref 13–17)
HYPOCHROMIA BLD QL: SLIGHT — SIGNIFICANT CHANGE UP
IMM GRANULOCYTES NFR BLD AUTO: 1.4 % — HIGH (ref 0–0.9)
IMM GRANULOCYTES NFR BLD AUTO: 1.4 % — HIGH (ref 0–0.9)
INR BLD: 1.47 RATIO — HIGH (ref 0.85–1.18)
INR BLD: 1.76 RATIO — HIGH (ref 0.85–1.18)
INR BLD: 2.59 RATIO — HIGH (ref 0.85–1.18)
LDH SERPL L TO P-CCNC: 319 U/L — HIGH (ref 50–242)
LYMPHOCYTES # BLD AUTO: 0.92 K/UL — LOW (ref 1–3.3)
LYMPHOCYTES # BLD AUTO: 0.94 K/UL — LOW (ref 1–3.3)
LYMPHOCYTES # BLD AUTO: 1.85 K/UL — SIGNIFICANT CHANGE UP (ref 1–3.3)
LYMPHOCYTES # BLD AUTO: 24.1 % — SIGNIFICANT CHANGE UP (ref 13–44)
LYMPHOCYTES # BLD AUTO: 26.5 % — SIGNIFICANT CHANGE UP (ref 13–44)
LYMPHOCYTES # BLD AUTO: 29.8 % — SIGNIFICANT CHANGE UP (ref 13–44)
MACROCYTES BLD QL: SLIGHT — SIGNIFICANT CHANGE UP
MAGNESIUM SERPL-MCNC: 2.3 MG/DL — SIGNIFICANT CHANGE UP (ref 1.6–2.6)
MAGNESIUM SERPL-MCNC: 2.4 MG/DL — SIGNIFICANT CHANGE UP (ref 1.6–2.6)
MAGNESIUM SERPL-MCNC: 2.6 MG/DL — SIGNIFICANT CHANGE UP (ref 1.6–2.6)
MANUAL SMEAR VERIFICATION: SIGNIFICANT CHANGE UP
MCHC RBC-ENTMCNC: 23.7 PG — LOW (ref 27–34)
MCHC RBC-ENTMCNC: 23.8 PG — LOW (ref 27–34)
MCHC RBC-ENTMCNC: 24 PG — LOW (ref 27–34)
MCHC RBC-ENTMCNC: 31.2 GM/DL — LOW (ref 32–36)
MCHC RBC-ENTMCNC: 31.5 GM/DL — LOW (ref 32–36)
MCHC RBC-ENTMCNC: 31.6 GM/DL — LOW (ref 32–36)
MCV RBC AUTO: 75.4 FL — LOW (ref 80–100)
MCV RBC AUTO: 76 FL — LOW (ref 80–100)
MCV RBC AUTO: 76 FL — LOW (ref 80–100)
MONOCYTES # BLD AUTO: 0.4 K/UL — SIGNIFICANT CHANGE UP (ref 0–0.9)
MONOCYTES # BLD AUTO: 0.72 K/UL — SIGNIFICANT CHANGE UP (ref 0–0.9)
MONOCYTES # BLD AUTO: 1.11 K/UL — HIGH (ref 0–0.9)
MONOCYTES NFR BLD AUTO: 10.4 % — SIGNIFICANT CHANGE UP (ref 2–14)
MONOCYTES NFR BLD AUTO: 17.9 % — HIGH (ref 2–14)
MONOCYTES NFR BLD AUTO: 20.3 % — HIGH (ref 2–14)
NEUTROPHILS # BLD AUTO: 1.65 K/UL — LOW (ref 1.8–7.4)
NEUTROPHILS # BLD AUTO: 1.88 K/UL — SIGNIFICANT CHANGE UP (ref 1.8–7.4)
NEUTROPHILS # BLD AUTO: 2.62 K/UL — SIGNIFICANT CHANGE UP (ref 1.8–7.4)
NEUTROPHILS NFR BLD AUTO: 42.2 % — LOW (ref 43–77)
NEUTROPHILS NFR BLD AUTO: 46.4 % — SIGNIFICANT CHANGE UP (ref 43–77)
NEUTROPHILS NFR BLD AUTO: 49.1 % — SIGNIFICANT CHANGE UP (ref 43–77)
NRBC # BLD: 0 /100 WBCS — SIGNIFICANT CHANGE UP (ref 0–0)
NRBC # BLD: 1 /100 WBCS — HIGH (ref 0–0)
NRBC # BLD: 3 /100 WBCS — HIGH (ref 0–0)
PHOSPHATE SERPL-MCNC: 1.7 MG/DL — LOW (ref 2.5–4.5)
PHOSPHATE SERPL-MCNC: 2.1 MG/DL — LOW (ref 2.5–4.5)
PHOSPHATE SERPL-MCNC: 3.2 MG/DL — SIGNIFICANT CHANGE UP (ref 2.5–4.5)
PLAT MORPH BLD: NORMAL — SIGNIFICANT CHANGE UP
PLATELET # BLD AUTO: 64 K/UL — LOW (ref 150–400)
PLATELET # BLD AUTO: 81 K/UL — LOW (ref 150–400)
PLATELET # BLD AUTO: 84 K/UL — LOW (ref 150–400)
POIKILOCYTOSIS BLD QL AUTO: SLIGHT — SIGNIFICANT CHANGE UP
POLYCHROMASIA BLD QL SMEAR: SLIGHT — SIGNIFICANT CHANGE UP
POTASSIUM SERPL-MCNC: 3 MMOL/L — LOW (ref 3.5–5.3)
POTASSIUM SERPL-MCNC: 3.4 MMOL/L — LOW (ref 3.5–5.3)
POTASSIUM SERPL-MCNC: 3.5 MMOL/L — SIGNIFICANT CHANGE UP (ref 3.5–5.3)
POTASSIUM SERPL-SCNC: 3 MMOL/L — LOW (ref 3.5–5.3)
POTASSIUM SERPL-SCNC: 3.4 MMOL/L — LOW (ref 3.5–5.3)
POTASSIUM SERPL-SCNC: 3.5 MMOL/L — SIGNIFICANT CHANGE UP (ref 3.5–5.3)
PROT SERPL-MCNC: 4.8 G/DL — LOW (ref 6–8.3)
PROT SERPL-MCNC: 4.9 G/DL — LOW (ref 6–8.3)
PROT SERPL-MCNC: 5.2 G/DL — LOW (ref 6–8.3)
PROTHROM AB SERPL-ACNC: 16 SEC — HIGH (ref 9.5–13)
PROTHROM AB SERPL-ACNC: 19 SEC — HIGH (ref 9.5–13)
PROTHROM AB SERPL-ACNC: 26.5 SEC — HIGH (ref 9.5–13)
RBC # BLD: 3.38 M/UL — LOW (ref 4.2–5.8)
RBC # BLD: 3.5 M/UL — LOW (ref 4.2–5.8)
RBC # BLD: 3.53 M/UL — LOW (ref 4.2–5.8)
RBC # FLD: 21.6 % — HIGH (ref 10.3–14.5)
RBC # FLD: 21.9 % — HIGH (ref 10.3–14.5)
RBC # FLD: 22.3 % — HIGH (ref 10.3–14.5)
RBC BLD AUTO: ABNORMAL
SODIUM SERPL-SCNC: 152 MMOL/L — HIGH (ref 135–145)
SODIUM SERPL-SCNC: 159 MMOL/L — HIGH (ref 135–145)
SODIUM SERPL-SCNC: 162 MMOL/L — CRITICAL HIGH (ref 135–145)
SPECIMEN SOURCE: SIGNIFICANT CHANGE UP
SPECIMEN SOURCE: SIGNIFICANT CHANGE UP
TARGETS BLD QL SMEAR: SLIGHT — SIGNIFICANT CHANGE UP
VARIANT LYMPHS # BLD: 0.9 % — SIGNIFICANT CHANGE UP (ref 0–6)
WBC # BLD: 3.55 K/UL — LOW (ref 3.8–10.5)
WBC # BLD: 3.82 K/UL — SIGNIFICANT CHANGE UP (ref 3.8–10.5)
WBC # BLD: 6.21 K/UL — SIGNIFICANT CHANGE UP (ref 3.8–10.5)
WBC # FLD AUTO: 3.55 K/UL — LOW (ref 3.8–10.5)
WBC # FLD AUTO: 3.82 K/UL — SIGNIFICANT CHANGE UP (ref 3.8–10.5)
WBC # FLD AUTO: 6.21 K/UL — SIGNIFICANT CHANGE UP (ref 3.8–10.5)
WNV IGG TITR FLD: POSITIVE
WNV IGM SPEC QL: NEGATIVE — SIGNIFICANT CHANGE UP

## 2024-09-19 PROCEDURE — 36514 APHERESIS PLASMA: CPT

## 2024-09-19 PROCEDURE — 70553 MRI BRAIN STEM W/O & W/DYE: CPT | Mod: 26

## 2024-09-19 PROCEDURE — 99232 SBSQ HOSP IP/OBS MODERATE 35: CPT | Mod: GC

## 2024-09-19 PROCEDURE — 99291 CRITICAL CARE FIRST HOUR: CPT | Mod: GC

## 2024-09-19 PROCEDURE — 99291 CRITICAL CARE FIRST HOUR: CPT

## 2024-09-19 RX ORDER — SODIUM CHLORIDE IRRIG SOLUTION 0.9 %
1000 SOLUTION, IRRIGATION IRRIGATION
Refills: 0 | Status: DISCONTINUED | OUTPATIENT
Start: 2024-09-19 | End: 2024-09-20

## 2024-09-19 RX ORDER — METOPROLOL TARTRATE 50 MG
5 TABLET ORAL ONCE
Refills: 0 | Status: DISCONTINUED | OUTPATIENT
Start: 2024-09-19 | End: 2024-09-19

## 2024-09-19 RX ORDER — HYDRALAZINE HYDROCHLORIDE 100 MG/1
10 TABLET ORAL ONCE
Refills: 0 | Status: COMPLETED | OUTPATIENT
Start: 2024-09-19 | End: 2024-09-19

## 2024-09-19 RX ORDER — FENTANYL CITRATE-0.9 % NACL/PF 300MCG/30
50 PATIENT CONTROLLED ANALGESIA VIAL INJECTION EVERY 4 HOURS
Refills: 0 | Status: DISCONTINUED | OUTPATIENT
Start: 2024-09-19 | End: 2024-09-25

## 2024-09-19 RX ORDER — FENTANYL CITRATE-0.9 % NACL/PF 300MCG/30
50 PATIENT CONTROLLED ANALGESIA VIAL INJECTION ONCE
Refills: 0 | Status: DISCONTINUED | OUTPATIENT
Start: 2024-09-19 | End: 2024-09-19

## 2024-09-19 RX ORDER — SODIUM CHLORIDE 5 G/100ML
500 INJECTION, SOLUTION INTRAVENOUS
Refills: 0 | Status: DISCONTINUED | OUTPATIENT
Start: 2024-09-19 | End: 2024-09-19

## 2024-09-19 RX ORDER — POTASSIUM PHOSPHATE, MONOBASIC POTASSIUM PHOSPHATE, DIBASIC 224; 236 MG/ML; MG/ML
30 INJECTION, SOLUTION, CONCENTRATE INTRAVENOUS ONCE
Refills: 0 | Status: COMPLETED | OUTPATIENT
Start: 2024-09-19 | End: 2024-09-19

## 2024-09-19 RX ADMIN — Medication 50 MICROGRAM(S): at 01:05

## 2024-09-19 RX ADMIN — Medication 100 MILLIEQUIVALENT(S): at 19:31

## 2024-09-19 RX ADMIN — CHLORHEXIDINE GLUCONATE ORAL RINSE 1 APPLICATION(S): 1.2 SOLUTION DENTAL at 05:49

## 2024-09-19 RX ADMIN — Medication 100 MILLIEQUIVALENT(S): at 21:50

## 2024-09-19 RX ADMIN — LACTULOSE 30 GRAM(S): 10 SOLUTION ORAL; RECTAL at 18:08

## 2024-09-19 RX ADMIN — Medication 17 GRAM(S): at 18:50

## 2024-09-19 RX ADMIN — Medication 20 MILLIEQUIVALENT(S): at 01:03

## 2024-09-19 RX ADMIN — PANTOPRAZOLE SODIUM 40 MILLIGRAM(S): 40 TABLET, DELAYED RELEASE ORAL at 18:09

## 2024-09-19 RX ADMIN — LACTULOSE 30 GRAM(S): 10 SOLUTION ORAL; RECTAL at 12:02

## 2024-09-19 RX ADMIN — CHLORHEXIDINE GLUCONATE ORAL RINSE 15 MILLILITER(S): 1.2 SOLUTION DENTAL at 18:08

## 2024-09-19 RX ADMIN — Medication 20 MILLIEQUIVALENT(S): at 01:05

## 2024-09-19 RX ADMIN — Medication 40 MILLIEQUIVALENT(S): at 19:31

## 2024-09-19 RX ADMIN — THIAMINE HYDROCHLORIDE 100 MILLIGRAM(S): 100 INJECTION, SOLUTION INTRAMUSCULAR; INTRAVENOUS at 12:02

## 2024-09-19 RX ADMIN — FOLIC ACID 1 MILLIGRAM(S): 1 TABLET ORAL at 16:27

## 2024-09-19 RX ADMIN — Medication 100 MILLILITER(S): at 19:31

## 2024-09-19 RX ADMIN — HYDRALAZINE HYDROCHLORIDE 10 MILLIGRAM(S): 100 TABLET ORAL at 00:40

## 2024-09-19 RX ADMIN — Medication 17 GRAM(S): at 05:25

## 2024-09-19 RX ADMIN — LACTULOSE 30 GRAM(S): 10 SOLUTION ORAL; RECTAL at 01:05

## 2024-09-19 RX ADMIN — CHLORHEXIDINE GLUCONATE ORAL RINSE 15 MILLILITER(S): 1.2 SOLUTION DENTAL at 05:25

## 2024-09-19 RX ADMIN — LACTULOSE 30 GRAM(S): 10 SOLUTION ORAL; RECTAL at 14:16

## 2024-09-19 RX ADMIN — Medication 50 MICROGRAM(S): at 01:30

## 2024-09-19 RX ADMIN — PANTOPRAZOLE SODIUM 40 MILLIGRAM(S): 40 TABLET, DELAYED RELEASE ORAL at 05:25

## 2024-09-19 RX ADMIN — LACTULOSE 30 GRAM(S): 10 SOLUTION ORAL; RECTAL at 05:24

## 2024-09-19 RX ADMIN — Medication 100 MILLIEQUIVALENT(S): at 20:58

## 2024-09-19 RX ADMIN — POTASSIUM PHOSPHATE, MONOBASIC POTASSIUM PHOSPHATE, DIBASIC 83.33 MILLIMOLE(S): 224; 236 INJECTION, SOLUTION, CONCENTRATE INTRAVENOUS at 23:06

## 2024-09-19 RX ADMIN — Medication 100 MILLIGRAM(S): at 12:02

## 2024-09-19 RX ADMIN — HYDRALAZINE HYDROCHLORIDE 10 MILLIGRAM(S): 100 TABLET ORAL at 16:26

## 2024-09-19 RX ADMIN — Medication 44.17 GRAM(S): at 04:45

## 2024-09-19 RX ADMIN — HYDRALAZINE HYDROCHLORIDE 10 MILLIGRAM(S): 100 TABLET ORAL at 21:01

## 2024-09-19 RX ADMIN — SODIUM CHLORIDE 15 MILLILITER(S): 5 INJECTION, SOLUTION INTRAVENOUS at 06:39

## 2024-09-19 RX ADMIN — Medication 17 GRAM(S): at 12:04

## 2024-09-19 NOTE — PROGRESS NOTE ADULT - SUBJECTIVE AND OBJECTIVE BOX
MEDICAL TOXICOLOGY CONSULT    HPI:  Patient is a 58 y/o male with pmh of ETOH abuse, and gastritis who initially presented to [place] for abdominal pain. Appears he went to the ED on Sept 11th and refused labs for abd pain, received 975mg acetaminophen, was discharged. Then on Sept 12th at home he took a total of 10-12x 500mg of acetaminophen for pain at home and drink more than usual (vodka and a small bottle of wine). Sept 13 he represented to the ED with abdominal pain, found to have elevated LFTs and APAP level of 42. Received 1g of acetaminophen in the ED. During his stay had worsening altered mental status, elevated transaminitis to 17,000 and DIC. He has been given platelets, cryo and NAC. Was transferred to [facility] for transplant hepatology consult.     9/19/24: Hypertensive, MRI brain negative. Remains intubated and minimally responsive off of sedation.     PAST MEDICAL & SURGICAL HISTORY:  PUD (peptic ulcer disease)      EtOH dependence      Gastritis      Elevated lipase      No significant past surgical history          MEDICATION HISTORY:  acetylcysteine IVPB 12 Gram(s) IV Intermittent every 24 hours  cefTRIAXone   IVPB 1000 milliGRAM(s) IV Intermittent every 24 hours  chlorhexidine 0.12% Liquid 15 milliLiter(s) Oral Mucosa every 12 hours  chlorhexidine 4% Liquid 1 Application(s) Topical <User Schedule>  dexMEDEtomidine Infusion 0.25 MICROgram(s)/kG/Hr IV Continuous <Continuous>  fentaNYL    Injectable 50 MICROGram(s) IV Push every 4 hours PRN  folic acid Injectable 1 milliGRAM(s) IV Push daily  lactulose Syrup 30 Gram(s) Oral every 4 hours  pantoprazole  Injectable 40 milliGRAM(s) IV Push two times a day  polyethylene glycol 3350 17 Gram(s) Oral <User Schedule>  thiamine 100 milliGRAM(s) Oral daily      FAMILY HISTORY:  FH: HTN (hypertension)        REVIEW OF SYSTEMS:   As per ED provider      Vital Signs Last 24 Hrs  T(C): 37.8 (19 Sep 2024 08:00), Max: 37.8 (19 Sep 2024 08:00)  T(F): 100 (19 Sep 2024 08:00), Max: 100 (19 Sep 2024 08:00)  HR: 100 (19 Sep 2024 12:00) (86 - 105)  BP: 169/80 (18 Sep 2024 15:00) (161/81 - 179/81)  BP(mean): 115 (18 Sep 2024 15:00) (107 - 116)  RR: 11 (19 Sep 2024 12:00) (9 - 24)  SpO2: 100% (19 Sep 2024 12:00) (97% - 100%)    Parameters below as of 19 Sep 2024 08:00  Patient On (Oxygen Delivery Method): ventilator    O2 Concentration (%): 30    SIGNIFICANT LABORATORY STUDIES:                        8.1    3.82  )-----------( 84       ( 19 Sep 2024 11:20 )             25.7       09-19    159[H]  |  122[H]  |  38[H]  ----------------------------<  150[H]  3.4[L]   |  20[L]  |  1.15    Ca    9.1      19 Sep 2024 11:20  Phos  2.1     09-19  Mg     2.6     09-19    TPro  4.9[L]  /  Alb  2.8[L]  /  TBili  6.4[H]  /  DBili  x   /  AST  241[H]  /  ALT  401[H]  /  AlkPhos  61  09-19      PT/INR - ( 19 Sep 2024 11:20 )   PT: 19.0 sec;   INR: 1.76 ratio         PTT - ( 19 Sep 2024 11:20 )  PTT:26.4 sec

## 2024-09-19 NOTE — PROGRESS NOTE ADULT - ATTENDING COMMENTS
Patient is a 59 year old M with PMHx ETOH abuse with frequent admissions for withdrawal in the past initially presents to OSH for ETOH withdrawal, Patient with transaminitis, initially mild. Possible episode of hypotension with LFT--> 17K, elevated ammonia, poor mental status.     Patient transferred to [facility] for liver failure and possible transplant evaluation. Reported elevated tylenol level started on NAC. LFTs down trending but will with very high meld, as well as coagulopathy.      LFT improving, coagulopathy also improved. Today with worsening mental status. Less responsive. Ammonia overnight was 80. Having 5-6 BM with lactulose Enemas.     Patient now intubated for worsening MS and airway protection. CT scan showing cerebral edema. Concerning for ischemic injury from hypotension vs cerebral edema from liver failure/ammonia    Pending plasma exchange, mannitol and hypersonic saline. EEG in place, pending MRI.   On mannitol x2 doses. Repeat CT without edema.   On day 2/3 of PLEX.    # ETOH abuse  # Acute liver failure  # Hepatic encephalopathy  # Coagulopathy  # acute hypoxemic respiratory failure   # cerebral edema  - Acute liver failure, most likely 2/2 to ischemic vs etoh induced. LFT initially > 17K now down trending.   - C/W nac, Repeat Tylenol level WNL. F/U with hep recs  - Trend MELD, LFTs  - Likely not a candidate for Liver transplant  - Unable to pass NGT on multiple passes but multiple different providers. Now able to place post intubation. Was having BM with rectal lactulose.   -  Now on oral lactulose. Monitor ammonia levels. Uptitrate as needed. Added miralax  - Given cerebral edema, 2/2 to liver failure vs anoxia 2/2 to hypotension. Will plan for PLEX, Mannitol and hypertonic. Will set vent to alkaosis.   - MRI now with improved edema. S/P mannitol, will d/c given > 48 hours as well as hypertonic saline.   - EEG without sz, keep of sedation to assess for mental status.   - C/W vent, Tolerated PC better, Adjust based on blood gas. PS as tolerated.    - Caoguloapthy improving s/p cryo. Continue to monitor.   - DVT ppx- SCD 2/2 to elevated INR  - Dispo- full code.  D/W Son and Daughter, overall poor prognosis given CTH findings. Will have ongoing GOC. Palliative care consulted.

## 2024-09-19 NOTE — PROGRESS NOTE ADULT - SUBJECTIVE AND OBJECTIVE BOX
Progress Note   Miriam Alexandra PGY4- GI/Hep    SUBJECTIVE: Patient seen and examined at bedside.     OBJECTIVE:    VITAL SIGNS:  ICU Vital Signs Last 24 Hrs  T(C): 37.3 (19 Sep 2024 04:00), Max: 37.7 (19 Sep 2024 00:00)  T(F): 99.1 (19 Sep 2024 04:00), Max: 99.9 (19 Sep 2024 00:00)  HR: 95 (19 Sep 2024 05:00) (86 - 105)  BP: 169/80 (18 Sep 2024 15:00) (121/61 - 200/88)  BP(mean): 115 (18 Sep 2024 15:00) (88 - 126)  ABP: 109/73 (19 Sep 2024 05:00) (87/55 - 183/90)  ABP(mean): 90 (19 Sep 2024 05:00) (67 - 127)  RR: 12 (19 Sep 2024 05:00) (9 - 24)  SpO2: 99% (19 Sep 2024 05:00) (97% - 100%)    O2 Parameters below as of 19 Sep 2024 04:00  Patient On (Oxygen Delivery Method): ventilator    O2 Concentration (%): 30      Mode: AC/ CMV (Assist Control/ Continuous Mandatory Ventilation), RR (machine): 10, FiO2: 30, PEEP: 5, ITime: 1.15, MAP: 8, PC: 10, PIP: 15    09-17 @ 07:01 - 09-18 @ 07:00  --------------------------------------------------------  IN: 4297.1 mL / OUT: 2630 mL / NET: 1667.1 mL    09-18 @ 07:01  -  09-19 @ 06:34  --------------------------------------------------------  IN: 3070.3 mL / OUT: 2615 mL / NET: 455.3 mL        PHYSICAL EXAM:  General: NAD  HEENT: NC/AT  Neck: supple  Respiratory: Intubated   Cardiovascular: RRR  Abdomen: soft, mild distention; +BS x4  Extremities: WWP, 2+ peripheral pulses b/l  Skin: normal color and turgor; no rash  Neurological: Obtunded     MEDICATIONS:  MEDICATIONS  (STANDING):  acetylcysteine IVPB 12 Gram(s) IV Intermittent every 24 hours  cefTRIAXone   IVPB 1000 milliGRAM(s) IV Intermittent every 24 hours  chlorhexidine 0.12% Liquid 15 milliLiter(s) Oral Mucosa every 12 hours  chlorhexidine 4% Liquid 1 Application(s) Topical <User Schedule>  dexMEDEtomidine Infusion 0.25 MICROgram(s)/kG/Hr (5.19 mL/Hr) IV Continuous <Continuous>  folic acid Injectable 1 milliGRAM(s) IV Push daily  lactulose Syrup 30 Gram(s) Oral every 4 hours  pantoprazole  Injectable 40 milliGRAM(s) IV Push two times a day  polyethylene glycol 3350 17 Gram(s) Oral two times a day  propofol Infusion 20 MICROgram(s)/kG/Min (9.96 mL/Hr) IV Continuous <Continuous>  sodium chloride 3%. 500 milliLiter(s) (15 mL/Hr) IV Continuous <Continuous>  thiamine 100 milliGRAM(s) Oral daily    MEDICATIONS  (PRN):  fentaNYL    Injectable 50 MICROGram(s) IV Push every 4 hours PRN Agitation      ALLERGIES:  Allergies    No Known Allergies    Intolerances        LABS:                        8.4    3.55  )-----------( 64       ( 19 Sep 2024 01:07 )             26.6     09-19    152[H]  |  117[H]  |  35[H]  ----------------------------<  134[H]  3.5   |  18[L]  |  1.17    Ca    8.4      19 Sep 2024 01:07  Phos  3.2     09-19  Mg     2.3     09-19    TPro  4.8[L]  /  Alb  2.8[L]  /  TBili  7.6[H]  /  DBili  x   /  AST  406[H]  /  ALT  528[H]  /  AlkPhos  71  09-19    PT/INR - ( 19 Sep 2024 01:07 )   PT: 26.5 sec;   INR: 2.59 ratio         PTT - ( 19 Sep 2024 01:07 )  PTT:25.4 sec  Urinalysis Basic - ( 19 Sep 2024 01:07 )    Color: x / Appearance: x / SG: x / pH: x  Gluc: 134 mg/dL / Ketone: x  / Bili: x / Urobili: x   Blood: x / Protein: x / Nitrite: x   Leuk Esterase: x / RBC: x / WBC x   Sq Epi: x / Non Sq Epi: x / Bacteria: x         Progress Note   Miriam Alexandra PGY4- GI/Hep    SUBJECTIVE: Patient seen and examined at bedside.   - Still obtunded   - MRI without acute stroke or hemorrhage   - Hypertensive overnight     OBJECTIVE:    VITAL SIGNS:  ICU Vital Signs Last 24 Hrs  T(C): 37.3 (19 Sep 2024 04:00), Max: 37.7 (19 Sep 2024 00:00)  T(F): 99.1 (19 Sep 2024 04:00), Max: 99.9 (19 Sep 2024 00:00)  HR: 95 (19 Sep 2024 05:00) (86 - 105)  BP: 169/80 (18 Sep 2024 15:00) (121/61 - 200/88)  BP(mean): 115 (18 Sep 2024 15:00) (88 - 126)  ABP: 109/73 (19 Sep 2024 05:00) (87/55 - 183/90)  ABP(mean): 90 (19 Sep 2024 05:00) (67 - 127)  RR: 12 (19 Sep 2024 05:00) (9 - 24)  SpO2: 99% (19 Sep 2024 05:00) (97% - 100%)    O2 Parameters below as of 19 Sep 2024 04:00  Patient On (Oxygen Delivery Method): ventilator    O2 Concentration (%): 30      Mode: AC/ CMV (Assist Control/ Continuous Mandatory Ventilation), RR (machine): 10, FiO2: 30, PEEP: 5, ITime: 1.15, MAP: 8, PC: 10, PIP: 15    09-17 @ 07:01 - 09-18 @ 07:00  --------------------------------------------------------  IN: 4297.1 mL / OUT: 2630 mL / NET: 1667.1 mL    09-18 @ 07:01 - 09-19 @ 06:34  --------------------------------------------------------  IN: 3070.3 mL / OUT: 2615 mL / NET: 455.3 mL        PHYSICAL EXAM:  General: NAD  HEENT: NC/AT  Neck: supple  Respiratory: Intubated   Cardiovascular: RRR  Abdomen: soft, mild distention; +BS x4  Extremities: WWP, 2+ peripheral pulses b/l  Skin: normal color and turgor; no rash  Neurological: Obtunded     MEDICATIONS:  MEDICATIONS  (STANDING):  acetylcysteine IVPB 12 Gram(s) IV Intermittent every 24 hours  cefTRIAXone   IVPB 1000 milliGRAM(s) IV Intermittent every 24 hours  chlorhexidine 0.12% Liquid 15 milliLiter(s) Oral Mucosa every 12 hours  chlorhexidine 4% Liquid 1 Application(s) Topical <User Schedule>  dexMEDEtomidine Infusion 0.25 MICROgram(s)/kG/Hr (5.19 mL/Hr) IV Continuous <Continuous>  folic acid Injectable 1 milliGRAM(s) IV Push daily  lactulose Syrup 30 Gram(s) Oral every 4 hours  pantoprazole  Injectable 40 milliGRAM(s) IV Push two times a day  polyethylene glycol 3350 17 Gram(s) Oral two times a day  propofol Infusion 20 MICROgram(s)/kG/Min (9.96 mL/Hr) IV Continuous <Continuous>  sodium chloride 3%. 500 milliLiter(s) (15 mL/Hr) IV Continuous <Continuous>  thiamine 100 milliGRAM(s) Oral daily    MEDICATIONS  (PRN):  fentaNYL    Injectable 50 MICROGram(s) IV Push every 4 hours PRN Agitation      ALLERGIES:  Allergies    No Known Allergies    Intolerances        LABS:                        8.4    3.55  )-----------( 64       ( 19 Sep 2024 01:07 )             26.6     09-19    152[H]  |  117[H]  |  35[H]  ----------------------------<  134[H]  3.5   |  18[L]  |  1.17    Ca    8.4      19 Sep 2024 01:07  Phos  3.2     09-19  Mg     2.3     09-19    TPro  4.8[L]  /  Alb  2.8[L]  /  TBili  7.6[H]  /  DBili  x   /  AST  406[H]  /  ALT  528[H]  /  AlkPhos  71  09-19    PT/INR - ( 19 Sep 2024 01:07 )   PT: 26.5 sec;   INR: 2.59 ratio         PTT - ( 19 Sep 2024 01:07 )  PTT:25.4 sec  Urinalysis Basic - ( 19 Sep 2024 01:07 )    Color: x / Appearance: x / SG: x / pH: x  Gluc: 134 mg/dL / Ketone: x  / Bili: x / Urobili: x   Blood: x / Protein: x / Nitrite: x   Leuk Esterase: x / RBC: x / WBC x   Sq Epi: x / Non Sq Epi: x / Bacteria: x

## 2024-09-19 NOTE — PROGRESS NOTE ADULT - ASSESSMENT
60 y/o M currently admitted in the MICU, toxicology is being consulted for chronic APAP toxicity. Pt is found to be in acute liver failure (with continued downtrending LFTs), DIC, and encephalopathy (minimally responsive off of sedation). While the patient does suffer from alcohol abuse the acute onset of liver failure is more consistent with acetaminophen toxicity however possibly multifactorial.     Recommendations:   -Continue NAC 100mg/kg continuous until liver function is back to baseline  -Continue Hepatology consultation, today's [facility] criteria: 1 (encephalopathy)  -Further medical and/or psychiatric care as per primary team     Thank you for involving us in the care of this patient. Assessment and plan discussed with toxicology attending Dr. Darline Light. Please do not hesitate to reach out to the toxicology team for any further questions or concerns.    The On-Call Toxicology Fellow can be reached 24/7 via Pager #[phone]  Please send a 10 digit call back # as [place] cover multiple hospitals    Bacilio Smith MD  Toxicology Fellow  PGY-5

## 2024-09-19 NOTE — PROGRESS NOTE ADULT - ASSESSMENT
Encephalopathy  EtOH use disorder  Acute cholecystitis and liver failure  HTN  Pancytopenia  Hyperammonemia  Cerebral edema  Hypernatremia    - Etiology for diffuse cerebral edema with above exam is most likely secondary to severe hyperammonemia/hepatic encephalopathy. However, cannot completely exclude other causes such as cerebrovascular, infectious, inflammatory, or other toxic/metabolic. CT head, personally reviewed by me, with diffuse sulcal effacement and blurring of the gray-white junction most consistent with cerebral edema but no hemorrhage. Abnormal movements are posturing and NOT seizures. 9/18 - Hypertonic saline given and being transitioned to PLEX. CTA head/neck and CTV head, personally reviewed by me, largely unrevealing with likely artifact on CTV head (even if non-occlusive venous thrombus found would not be contributing to current clinical picture). EEG with diffuse suppression likely indicating cortical dysfunction and possible death; although cannot exclude lingering effect of sedation this is much less likely. 9/19 - Lab markers slowly improving but clinically unchanged. MRI brain, personally reviewed by me, with mild T2/FLAIR hyperintensity thoughout the entire cortex as well as mild to moderate sulcal effacement laterally likely consistent with cerebral edema but no blurring of the gray-white junction or other acute intracranial findings  - CTA head/neck w/, CTV head w/o with results as above  - MRI brain w/ and w/o with results as above  - vEEG with diffuse suppression and slowing with evidence for focal slowing, epileptiform discharges, or seizures. STOPPED. If mental status not improved despite maximal medical therapy and laboratory improvement would then get repeat vEEG on 9/23  - Optimize sedation with minimizing propofol, benzodiazepines, or barbiturates (Precedex, fentanyl, or hydromorphone OK)  - Await labs for additional causes with Vitamin D 25 OH, B6  - Above recommendations discussed with MICU team, who verbalized agreement and understanding  - Continue to address above medical problems, as you are doing  - Will continue to follow patient with you

## 2024-09-19 NOTE — PROGRESS NOTE ADULT - ASSESSMENT
59 years old male with h/o chronic ETOH abuse and dependence (1 bottle of Vodka a day) with long standing hx of alcohol withdrawal and DTs with frequent hospital/ICU admissions, alcoholic gastritis/esophagitis, PUD, HLD, hx of hypoxic respiratory failure requiring intubation due to aspiration PNA (most recent intubation Aril 2020),  staph PNA, COVID-19 infection Dec 2020 presented with abd pain at the OSH.    #Decompensated alcoholic cirrhosis  #GUNJAN  - Calculated MELD3 score on 9/16 34; 9/17 32; 9/19 27   - Presented with AMS at OSH. Had multiple hospitalizations in the past for alcohol withdrawal and DT per records. Unclear last alcohol drink. His alcohol level this admission was <10 and Tylenol level was 42 on admission. Now intubated with findings of cerebral edema and concern for GUNJAN.   - Could be due to Tylenol use in the setting of underlying liver disease causing acute elevation in his liver enzymes. However, could also be due to ischemia as he was hypotensive at OSH, concerned for possible sepsis. GB distended concerning for acute cholecystitis, no other sources of infection.  - Other lab serologies: CMV, EBV PCR neg, Hep B/C neg.     Recommendations:   - C/w mannitol for concern for cerebral edema which is either 2/2 snf or global ischemia 2/2 hypotensive episode at OSH;   - Plan for second PLEX today  - Consider CRRT (Not HD) for elevated ammonia and cerebral edema iso snf   - Q4-6 labs - ammonia, CMP, phos, INR  - C/w D5 and monitor sugar levels   - Aggressive electrolyte repletion   - Keep MAP >65   - Continue with NAC drip  - Continue with lactulose enema, goal 3-4 BMs per day.   - Patient is unlikely a candidate for LT due to multiple admissions in the past with alcohol withdrawal.     All recommendations are tentative until note is attested by an attending.     Miriam Cifuentes MD  Gastroenterology/Hepatology Fellow, PGY-5  Please contact via TEAMS    NON-URGENT CONSULTS:  Please email [email or web address]

## 2024-09-19 NOTE — PROGRESS NOTE ADULT - SUBJECTIVE AND OBJECTIVE BOX
INTERVAL HPI/OVERNIGHT EVENTS: Patient hypertensive overnight. MRI done and did not show any acute abnormality.     SUBJECTIVE: Patient seen and examined at bedside. Remains intubated. Not really responsive even off sedation.       VITAL SIGNS:  ICU Vital Signs Last 24 Hrs  T(C): 37.3 (19 Sep 2024 04:00), Max: 37.7 (19 Sep 2024 00:00)  T(F): 99.1 (19 Sep 2024 04:00), Max: 99.9 (19 Sep 2024 00:00)  HR: 95 (19 Sep 2024 07:00) (86 - 105)  BP: 169/80 (18 Sep 2024 15:00) (121/61 - 179/81)  BP(mean): 115 (18 Sep 2024 15:00) (88 - 116)  ABP: 102/69 (19 Sep 2024 07:00) (87/55 - 183/90)  ABP(mean): 85 (19 Sep 2024 07:00) (67 - 127)  RR: 11 (19 Sep 2024 07:00) (9 - 24)  SpO2: 100% (19 Sep 2024 07:00) (97% - 100%)    O2 Parameters below as of 19 Sep 2024 04:00  Patient On (Oxygen Delivery Method): ventilator    O2 Concentration (%): 30      Mode: AC/ CMV (Assist Control/ Continuous Mandatory Ventilation), RR (machine): 10, FiO2: 30, PEEP: 5, ITime: 1.15, MAP: 8, PC: 10, PIP: 15  Plateau pressure:   P/F ratio:     09-18 @ 07:01  -  09-19 @ 07:00  --------------------------------------------------------  IN: 3145.7 mL / OUT: 2725 mL / NET: 420.7 mL      CAPILLARY BLOOD GLUCOSE      POCT Blood Glucose.: 142 mg/dL (19 Sep 2024 05:41)    ECG:    PHYSICAL EXAM:    HEAD:  Atraumatic, Normocephalic  EYES: EOMI, PERRLA, conjunctiva and sclera clear  ENT: Moist mucous membranes  NECK: Supple, No elevated JVP.  CHEST/LUNG: mechanical ventilation sounds   HEART: Regular rate and rhythm; No murmurs, rubs, or gallops  ABDOMEN: Bowel sounds present; Soft, nontender, nondistended  EXTREMITIES:  2+ Peripheral Pulses, brisk capillary refill. No clubbing, cyanosis, or edema  NERVOUS SYSTEM:  A&Ox0, not responding to any stimuli- sedated  SKIN: No rashes or lesions    MEDICATIONS:  MEDICATIONS  (STANDING):  acetylcysteine IVPB 12 Gram(s) IV Intermittent every 24 hours  cefTRIAXone   IVPB 1000 milliGRAM(s) IV Intermittent every 24 hours  chlorhexidine 0.12% Liquid 15 milliLiter(s) Oral Mucosa every 12 hours  chlorhexidine 4% Liquid 1 Application(s) Topical <User Schedule>  dexMEDEtomidine Infusion 0.25 MICROgram(s)/kG/Hr (5.19 mL/Hr) IV Continuous <Continuous>  folic acid Injectable 1 milliGRAM(s) IV Push daily  lactulose Syrup 30 Gram(s) Oral every 4 hours  pantoprazole  Injectable 40 milliGRAM(s) IV Push two times a day  polyethylene glycol 3350 17 Gram(s) Oral two times a day  propofol Infusion 20 MICROgram(s)/kG/Min (9.96 mL/Hr) IV Continuous <Continuous>  sodium chloride 3%. 500 milliLiter(s) (15 mL/Hr) IV Continuous <Continuous>  thiamine 100 milliGRAM(s) Oral daily    MEDICATIONS  (PRN):  fentaNYL    Injectable 50 MICROGram(s) IV Push every 4 hours PRN Agitation      ALLERGIES:  Allergies    No Known Allergies    Intolerances        LABS:                        8.4    3.55  )-----------( 64       ( 19 Sep 2024 01:07 )             26.6     09-19    152[H]  |  117[H]  |  35[H]  ----------------------------<  134[H]  3.5   |  18[L]  |  1.17    Ca    8.4      19 Sep 2024 01:07  Phos  3.2     09-19  Mg     2.3     09-19    TPro  4.8[L]  /  Alb  2.8[L]  /  TBili  7.6[H]  /  DBili  x   /  AST  406[H]  /  ALT  528[H]  /  AlkPhos  71  09-19    PT/INR - ( 19 Sep 2024 01:07 )   PT: 26.5 sec;   INR: 2.59 ratio         PTT - ( 19 Sep 2024 01:07 )  PTT:25.4 sec  Urinalysis Basic - ( 19 Sep 2024 01:07 )    Color: x / Appearance: x / SG: x / pH: x  Gluc: 134 mg/dL / Ketone: x  / Bili: x / Urobili: x   Blood: x / Protein: x / Nitrite: x   Leuk Esterase: x / RBC: x / WBC x   Sq Epi: x / Non Sq Epi: x / Bacteria: x        RADIOLOGY & ADDITIONAL TESTS: Reviewed.

## 2024-09-19 NOTE — PROGRESS NOTE ADULT - ASSESSMENT
Patient is a 59 year old M with PMHx ETOH abuse with frequent admissions for withdrawal & gastritis admitted to ICU for acute on chronic decompensated liver failure & alcohol withdrawal. Patient now intubated for airway protection. Started on PLEX for Acute liver failure. Pending further evaluation regarding AMS.    # Neuro:  Acute Metabolic Encephalopathy   -Likely secondary to elevated ammonia level along with liver failure.   -CT head showing concerns for cerebral edema or global hypoxic injury  -Dobhoff NGT placed- will start lactulose orally  -CTA and CTV- appear to have no major findings  -MRI did not show any acute issues  -vEEG- no major findings  Plan  -Neurology on board  -On PLEX- got 1st round 9/17  -On Hypertonic saline for concerns of cerebral edema.   -Ordered 2nd dose of mannitol- goal sodium between 145-155   -Continue Lactulose         #Resp:   -Patient intubated for airway protection.     #CV:  -Patient not on any vasopressors at this time. Hemodynamically stable.     #GI:  // Acute Decompensated Liver Failure - patient with long history of etoh abuse with evidence of hepatic steatosis on imaging.  Appears to be related to a prior ischemic episode   - RUQ US: Distended gallbladder with wall thickening up to 5 mm and trace pericholecystic fluid without evidence of cholelithiasis or biliary dilatation. Negative sonographic Gibson's sign.   - CTAP: Nondistended gallbladder with diffuse nonspecific wall edema. Severe fatty liver.  - labs significant for thrombocytopenia, transaminitis, anemia, elevated bilirubin & decreased fibrinogen, elevated lactate.   Plan  -Continue Thiamine and Folate  -Continue NAC   -On PLEX  -Hepatology consulted- ordered Lab work recommended by them   -Continue Lactulose     #Diet  -Started on tube feeds       #Renal:  -No acute issues. Will monitor Cr and urine output.       #ID:  - afebrile, wbc nl  -On ceftriaxone for SBP ppx     #Heme:  Concern for DIC   - elevated coags, d-dimer & fibrinogen 40  - S/P 3u cryo & 1 FFP in setting of DIC and liver failure-  - FU CBC. tranfuse for Hg <7   Fibrinogen now improved  Plan  -Will continue to monitor    #Elevated INR  -INR now improved to 1.74  Plan  -Will stop vitamin K and monitor    #Endo:  - cont to monitor FS Q6H        Patient is a 59 year old M with PMHx ETOH abuse with frequent admissions for withdrawal & gastritis admitted to ICU for acute on chronic decompensated liver failure & alcohol withdrawal. Patient now intubated for airway protection. Started on PLEX for Acute liver failure. Pending further evaluation regarding AMS.    # Neuro:  Acute Metabolic Encephalopathy   -Likely secondary to elevated ammonia level along with liver failure.   -CT head showing concerns for cerebral edema or global hypoxic injury  -Dobhoff NGT placed- will start lactulose orally  -CTA and CTV- appear to have no major findings  -MRI did not show any acute issues  -vEEG- no major findings  Plan  -Neurology on board  -On PLEX- got 1st round 9/17  -On Hypertonic saline for concerns of cerebral edema.   -Continue Lactulose and Miralax- ammonia still elevated  -Will d/c hypertonic saline and mannitol         #Resp:   -Patient intubated for airway protection.     #CV:  -Patient not on any vasopressors at this time. Hemodynamically stable.     #GI:  // Acute Decompensated Liver Failure - patient with long history of etoh abuse with evidence of hepatic steatosis on imaging.  Appears to be related to a prior ischemic episode   - RUQ US: Distended gallbladder with wall thickening up to 5 mm and trace pericholecystic fluid without evidence of cholelithiasis or biliary dilatation. Negative sonographic Gibson's sign.   - CTAP: Nondistended gallbladder with diffuse nonspecific wall edema. Severe fatty liver.  - labs significant for thrombocytopenia, transaminitis, anemia, elevated bilirubin & decreased fibrinogen, elevated lactate.   Plan  -Continue Thiamine and Folate  -Continue NAC   -On PLEX  -Hepatology consulted- ordered Lab work recommended by them   -Continue Lactulose     #Diet  -Started on tube feeds       #Renal:  -No acute issues. Will monitor Cr and urine output.       #ID:  - afebrile, wbc nl  -On ceftriaxone    #Heme:  Concern for DIC   - elevated coags, d-dimer & fibrinogen 40  - S/P 3u cryo & 1 FFP in setting of DIC and liver failure-  - FU CBC. tranfuse for Hg <7   Fibrinogen now improved  Plan  -Will continue to monitor    #Elevated INR  -INR now improved to 1.74  Plan  -Will monitor    #Endo:  - cont to monitor FS Q6H

## 2024-09-19 NOTE — CHART NOTE - NSCHARTNOTEFT_GEN_A_CORE
60 y/o M with EtoH abuse presented with elevated LFTs 2/2 acetaminophen toxicity and ? multifactorial. On 9/17 due to change in mental status, the CT MRI brain showed global hypoxic ischemic changes and cerebral edema, possibly 2/2 hyperammonemia. On mannitol and hypertonic saline. Blood bank was consulted post shiley placement for PLEX.    PLEX #1 on 9/17-9/18 using 1.5 plasma volume replacement (donor plasma). 4g lonnie gluconate given to prevent citrate toxicity effects. Procedure well tolerated.     PLEX#2 9/19/24 today. Labs and notes reviewed. Pt still remain intubated. 1.5 PV with donor plasma as replacement fluid. Procedure tolerated well.    PLEX#3 planned on 9/20/24. Pt needs to be assessed if further continuation indicated.

## 2024-09-19 NOTE — PROGRESS NOTE ADULT - ATTENDING COMMENTS
- alcoholic hepatitis and superimposed shock liver, init. adm. to OSH with confusion, found hypotensive with sBP 60s. Max. INR >7.     LFTs improved  - acute on chronic liver failure with brain edema on CT 9/16, here obtunded, then comatose    - s/p plasma exchange x2. Bilirubin and INR lower due to transfused plasma.  - comatose. MRI 9/18 showed resolved brain edema, (per MICU attending, still mild edema), unresponsive despite being off sedation since the morning again. Today dilated pupils 3 mm, mild constriction with light to ~2.5 mm, slightly asymmetric. Babinski negative bilaterally. No reaction to painful stimuli.    - now hypernatremia after 3% NaCl, also got mannitol -both off.    Plan:  - 3rd plasma exchange as per Stephen et al., 2016 for acute liver failure  - continue current medications including lactulose, IV thiamin, can add rifaximin  - MELD labs will return to his own liver-generated values in the days after the third plasma exchange - alcoholic hepatitis and superimposed shock liver, init. adm. to OSH with confusion, found hypotensive with sBP 60s. Max. INR >7.     LFTs improved  - acute on chronic liver failure with brain edema on CT 9/16, here obtunded, then comatose    - s/p plasma exchange x2. Bilirubin and INR lower due to transfused plasma.  - comatose. MRI 9/18 showed resolved brain edema, (per MICU attending, still mild edema), unresponsive despite being off sedation since the morning again. Today dilated pupils 3 mm, mild constriction with light to ~2.5 mm, slightly asymmetric. Babinski negative bilaterally. No reaction to painful stimuli.    - now hypernatremia after 3% NaCl, also got mannitol -both off.    Plan:  - 3rd plasma exchange as per Stephen et al., 2016 for acute liver failure  - continue current medications including lactulose, IV thiamin, can add rifaximin  - MELD labs will return to his own liver-generated values in the days after the third plasma exchange    Images reviewed. Discussed with MICU attending, Dr. Toure.

## 2024-09-19 NOTE — PROGRESS NOTE ADULT - SUBJECTIVE AND OBJECTIVE BOX
NEUROLOGY FOLLOW-UP CONSULT NOTE    RFC: Cerebral edema    Interval history: No acute neurologic events overnight. Patient now on PLEX for hyperammonemia. No clinical improvement at this time, no abnormal movements noted.    Meds:  MEDICATIONS  (STANDING):  acetylcysteine IVPB 12 Gram(s) IV Intermittent every 24 hours  cefTRIAXone   IVPB 1000 milliGRAM(s) IV Intermittent every 24 hours  chlorhexidine 0.12% Liquid 15 milliLiter(s) Oral Mucosa every 12 hours  chlorhexidine 4% Liquid 1 Application(s) Topical <User Schedule>  dexMEDEtomidine Infusion 0.25 MICROgram(s)/kG/Hr (5.19 mL/Hr) IV Continuous <Continuous>  folic acid Injectable 1 milliGRAM(s) IV Push daily  lactulose Syrup 30 Gram(s) Oral every 4 hours  pantoprazole  Injectable 40 milliGRAM(s) IV Push two times a day  polyethylene glycol 3350 17 Gram(s) Oral <User Schedule>  thiamine 100 milliGRAM(s) Oral daily    MEDICATIONS  (PRN):  fentaNYL    Injectable 50 MICROGram(s) IV Push every 4 hours PRN Agitation    GTT:  None    PMHx/PSHx/FHx/SHx:  Liver failure without hepatic coma    Complex Care    No pertinent family history in first degree relatives (Father, Mother)    No pertinent family history in first degree relatives    No pertinent family history in first degree relatives    FH: HTN (hypertension)    Handoff    Alcohol abuse    PUD (peptic ulcer disease)    Mixed hyperlipidemia    EtOH dependence    Gastritis    Substance abuse    DTs (delirium tremens)    HTN (hypertension)    Elevated lipase    Acetaminophen toxicity    No significant past surgical history    HEPATIC FAILURE, UNSPECIFIED W    [id number]        Allergies:  No Known Allergies      ROS: Due to clinical condition unable to assess (ABBEY)    O:  T(C): 37.5 (09-19-24 @ 12:00), Max: 37.8 (09-19-24 @ 08:00)  HR: 110 (09-19-24 @ 15:05) (86 - 110)  BP: --  RR: 25 (09-19-24 @ 14:00) (9 - 25)  SpO2: 100% (09-19-24 @ 15:05) (99% - 100%)    Focused neurologic exam:  MS - Intubated, not sedated, comatose, no speech output, does not follow commands. ABBEY orientation, rep/naming, attn/conc/recent and remote memory/fund of knowledge  CN - PERRL, EOMI by OCR, (+) face sens/str by corneals b/l, (+) spontaneous respirations (patient rate 9 bpm on CPAP), (+) cough. ABBEY VF, hearing, tongue/palate, trap/SCM  Motor - Normal bulk. Mildly inc extensor tone of R side and normal tone of L side. No spontaneous movements although than mild trembling. No grimace, withdrawal, or posturing to strong tactile stimuli in any extremity  Sens - As per Motor above  DTR's - 2+ and brisk BUE's, 3+ L KJ, 0+ R KJ, 0+ L AJ, 2+ R AJ, and neutral b/l plantar response  Coord - ABBEY  Gait and station - ABBEY    Pertinent labs/studies:  CBC with low H/H 8/26 and MCV dec 76, low plt 84  PT/INR inc 19.0/1.76, PTT WNL  BMP with inc Na 159, inc BUN 38, otherwise essentially WNL  Albumin dec 2.8, LFT's with inc ALT//241  Mg WNL, Phos dec 2.1  Ceruloplasmin dec 12, Hep screen (-), A1C 5.5%, UA (-), NH3 inc 227 -> 144 -> 94, CPK inc 725, TSH dec 0.05, T4 WNL, B12 WNL, folate WNL, WNV IgG (+)/IgM (-), syphilis serology TP (-), Lyme (-)    < from: MR Head w/wo IV Cont (09.19.24 @ 00:56) >  NO EVIDENCE OF INTRACRANIAL HEMORRHAGE, ACUTE TERRITORIAL   INFARCT OR AREA OF ABNORMAL ENHANCEMENT.    < end of copied text >      vEEG 9/18 -  EEG Classification / Summary:   Abnormal  EEG :   1. Diffuse suppression  Tachycardia     Clinical Impression:  1. Diffuse suppression that is persistent and nonreactive to stimulation which indicates very severe diffuse cerebral dysfunction    There were no seizures or  epileptiform abnormalities recorded.      < from: CT Venogram Brain w/ IV Cont (09.17.24 @ 18:46) >  CT HEAD:  No CT evidence of acute intracranial pathology.    CTA NECK:  No evidence of significant stenosis or occlusion.    The endotracheal tube appears to terminate immediately superior to the   javon.  This may be due to flexion of the head/neck during the CT scan.    Repeat chest radiograph is recommended to confirm proper positioning.    CTA HEAD:  No large vessel occlusion, significant stenosis or vascular abnormality   identified.    CT VENOGRAM:  Focal hypodensity in the right sigmoid sinus, without occlusion, probably   represents streak artifact from overlying EEG leads however nonocclusive   thrombus cannot be excluded.  The right transverse sinus and right   jugular bulb are patent without suspicious filling defect.  Follow-up MR   venogram is recommended for further evaluation    < end of copied text >      < from: CT Head No Cont (09.17.24 @ 09:09) >    Diffuse sulcal effacement and loss of gray-white   differentiation as compared to the prior head CT may suggest cerebral   edema versus global hypoxic ischemic injury. MRI is recommended for   further evaluation.    < end of copied text >

## 2024-09-20 LAB
ALBUMIN SERPL ELPH-MCNC: 3.1 G/DL — LOW (ref 3.3–5)
ALBUMIN SERPL ELPH-MCNC: 3.1 G/DL — LOW (ref 3.3–5)
ALBUMIN SERPL ELPH-MCNC: 3.2 G/DL — LOW (ref 3.3–5)
ALBUMIN SERPL ELPH-MCNC: 3.3 G/DL — SIGNIFICANT CHANGE UP (ref 3.3–5)
ALP SERPL-CCNC: 69 U/L — SIGNIFICANT CHANGE UP (ref 40–120)
ALP SERPL-CCNC: 77 U/L — SIGNIFICANT CHANGE UP (ref 40–120)
ALP SERPL-CCNC: 82 U/L — SIGNIFICANT CHANGE UP (ref 40–120)
ALP SERPL-CCNC: 92 U/L — SIGNIFICANT CHANGE UP (ref 40–120)
ALT FLD-CCNC: 102 U/L — HIGH (ref 10–45)
ALT FLD-CCNC: 178 U/L — HIGH (ref 10–45)
ALT FLD-CCNC: 203 U/L — HIGH (ref 10–45)
ALT FLD-CCNC: 61 U/L — HIGH (ref 10–45)
AMMONIA BLD-MCNC: 57 UMOL/L — HIGH (ref 11–55)
AMMONIA BLD-MCNC: 60 UMOL/L — HIGH (ref 11–55)
AMMONIA BLD-MCNC: 71 UMOL/L — HIGH (ref 11–55)
AMMONIA BLD-MCNC: 76 UMOL/L — HIGH (ref 11–55)
ANION GAP SERPL CALC-SCNC: 13 MMOL/L — SIGNIFICANT CHANGE UP (ref 5–17)
ANION GAP SERPL CALC-SCNC: 13 MMOL/L — SIGNIFICANT CHANGE UP (ref 5–17)
ANION GAP SERPL CALC-SCNC: 14 MMOL/L — SIGNIFICANT CHANGE UP (ref 5–17)
ANION GAP SERPL CALC-SCNC: 18 MMOL/L — HIGH (ref 5–17)
APTT BLD: 25 SEC — SIGNIFICANT CHANGE UP (ref 24.5–35.6)
APTT BLD: 26.3 SEC — SIGNIFICANT CHANGE UP (ref 24.5–35.6)
APTT BLD: 27.2 SEC — SIGNIFICANT CHANGE UP (ref 24.5–35.6)
APTT BLD: 29.7 SEC — SIGNIFICANT CHANGE UP (ref 24.5–35.6)
AST SERPL-CCNC: 109 U/L — HIGH (ref 10–40)
AST SERPL-CCNC: 40 U/L — SIGNIFICANT CHANGE UP (ref 10–40)
AST SERPL-CCNC: 57 U/L — HIGH (ref 10–40)
AST SERPL-CCNC: 97 U/L — HIGH (ref 10–40)
BASOPHILS # BLD AUTO: 0.01 K/UL — SIGNIFICANT CHANGE UP (ref 0–0.2)
BASOPHILS # BLD AUTO: 0.02 K/UL — SIGNIFICANT CHANGE UP (ref 0–0.2)
BASOPHILS NFR BLD AUTO: 0.1 % — SIGNIFICANT CHANGE UP (ref 0–2)
BASOPHILS NFR BLD AUTO: 0.2 % — SIGNIFICANT CHANGE UP (ref 0–2)
BILIRUB SERPL-MCNC: 3 MG/DL — HIGH (ref 0.2–1.2)
BILIRUB SERPL-MCNC: 5.4 MG/DL — HIGH (ref 0.2–1.2)
BILIRUB SERPL-MCNC: 6.1 MG/DL — HIGH (ref 0.2–1.2)
BILIRUB SERPL-MCNC: 6.8 MG/DL — HIGH (ref 0.2–1.2)
BUN SERPL-MCNC: 30 MG/DL — HIGH (ref 7–23)
BUN SERPL-MCNC: 30 MG/DL — HIGH (ref 7–23)
BUN SERPL-MCNC: 32 MG/DL — HIGH (ref 7–23)
BUN SERPL-MCNC: 34 MG/DL — HIGH (ref 7–23)
CALCIUM SERPL-MCNC: 9 MG/DL — SIGNIFICANT CHANGE UP (ref 8.4–10.5)
CALCIUM SERPL-MCNC: 9 MG/DL — SIGNIFICANT CHANGE UP (ref 8.4–10.5)
CALCIUM SERPL-MCNC: 9.4 MG/DL — SIGNIFICANT CHANGE UP (ref 8.4–10.5)
CALCIUM SERPL-MCNC: 9.9 MG/DL — SIGNIFICANT CHANGE UP (ref 8.4–10.5)
CHLORIDE SERPL-SCNC: 117 MMOL/L — HIGH (ref 96–108)
CHLORIDE SERPL-SCNC: 120 MMOL/L — HIGH (ref 96–108)
CHLORIDE SERPL-SCNC: 120 MMOL/L — HIGH (ref 96–108)
CHLORIDE SERPL-SCNC: 123 MMOL/L — HIGH (ref 96–108)
CO2 SERPL-SCNC: 22 MMOL/L — SIGNIFICANT CHANGE UP (ref 22–31)
CO2 SERPL-SCNC: 23 MMOL/L — SIGNIFICANT CHANGE UP (ref 22–31)
CO2 SERPL-SCNC: 24 MMOL/L — SIGNIFICANT CHANGE UP (ref 22–31)
CO2 SERPL-SCNC: 25 MMOL/L — SIGNIFICANT CHANGE UP (ref 22–31)
CREAT SERPL-MCNC: 1.08 MG/DL — SIGNIFICANT CHANGE UP (ref 0.5–1.3)
CREAT SERPL-MCNC: 1.09 MG/DL — SIGNIFICANT CHANGE UP (ref 0.5–1.3)
CREAT SERPL-MCNC: 1.09 MG/DL — SIGNIFICANT CHANGE UP (ref 0.5–1.3)
CREAT SERPL-MCNC: 1.12 MG/DL — SIGNIFICANT CHANGE UP (ref 0.5–1.3)
D DIMER BLD IA.RAPID-MCNC: 5037 NG/ML DDU — HIGH
EGFR: 77 ML/MIN/1.73M2 — SIGNIFICANT CHANGE UP
EGFR: 79 ML/MIN/1.73M2 — SIGNIFICANT CHANGE UP
EGFR: 79 ML/MIN/1.73M2 — SIGNIFICANT CHANGE UP
EGFR: 80 ML/MIN/1.73M2 — SIGNIFICANT CHANGE UP
EOSINOPHIL # BLD AUTO: 0.4 K/UL — SIGNIFICANT CHANGE UP (ref 0–0.5)
EOSINOPHIL # BLD AUTO: 0.46 K/UL — SIGNIFICANT CHANGE UP (ref 0–0.5)
EOSINOPHIL # BLD AUTO: 0.53 K/UL — HIGH (ref 0–0.5)
EOSINOPHIL # BLD AUTO: 0.57 K/UL — HIGH (ref 0–0.5)
EOSINOPHIL NFR BLD AUTO: 5.9 % — SIGNIFICANT CHANGE UP (ref 0–6)
EOSINOPHIL NFR BLD AUTO: 6.4 % — HIGH (ref 0–6)
EOSINOPHIL NFR BLD AUTO: 6.6 % — HIGH (ref 0–6)
EOSINOPHIL NFR BLD AUTO: 6.6 % — HIGH (ref 0–6)
FIBRINOGEN PPP-MCNC: 189 MG/DL — LOW (ref 200–445)
FIBRINOGEN PPP-MCNC: 193 MG/DL — LOW (ref 200–445)
FIBRINOGEN PPP-MCNC: 197 MG/DL — LOW (ref 200–445)
FIBRINOGEN PPP-MCNC: 218 MG/DL — SIGNIFICANT CHANGE UP (ref 200–445)
GAS PNL BLDA: SIGNIFICANT CHANGE UP
GLUCOSE SERPL-MCNC: 109 MG/DL — HIGH (ref 70–99)
GLUCOSE SERPL-MCNC: 117 MG/DL — HIGH (ref 70–99)
GLUCOSE SERPL-MCNC: 129 MG/DL — HIGH (ref 70–99)
GLUCOSE SERPL-MCNC: 148 MG/DL — HIGH (ref 70–99)
GRAM STN FLD: ABNORMAL
HCT VFR BLD CALC: 26 % — LOW (ref 39–50)
HCT VFR BLD CALC: 27.3 % — LOW (ref 39–50)
HCT VFR BLD CALC: 28 % — LOW (ref 39–50)
HCT VFR BLD CALC: 29.6 % — LOW (ref 39–50)
HGB BLD-MCNC: 7.9 G/DL — LOW (ref 13–17)
HGB BLD-MCNC: 8.4 G/DL — LOW (ref 13–17)
HGB BLD-MCNC: 8.6 G/DL — LOW (ref 13–17)
HGB BLD-MCNC: 8.9 G/DL — LOW (ref 13–17)
IMM GRANULOCYTES NFR BLD AUTO: 1.2 % — HIGH (ref 0–0.9)
IMM GRANULOCYTES NFR BLD AUTO: 1.4 % — HIGH (ref 0–0.9)
IMM GRANULOCYTES NFR BLD AUTO: 1.8 % — HIGH (ref 0–0.9)
IMM GRANULOCYTES NFR BLD AUTO: 1.8 % — HIGH (ref 0–0.9)
INR BLD: 1.37 RATIO — HIGH (ref 0.85–1.18)
INR BLD: 1.68 RATIO — HIGH (ref 0.85–1.18)
INR BLD: 1.72 RATIO — HIGH (ref 0.85–1.18)
INR BLD: 1.96 RATIO — HIGH (ref 0.85–1.18)
LDH SERPL L TO P-CCNC: 271 U/L — HIGH (ref 50–242)
LYMPHOCYTES # BLD AUTO: 1.67 K/UL — SIGNIFICANT CHANGE UP (ref 1–3.3)
LYMPHOCYTES # BLD AUTO: 1.77 K/UL — SIGNIFICANT CHANGE UP (ref 1–3.3)
LYMPHOCYTES # BLD AUTO: 2.09 K/UL — SIGNIFICANT CHANGE UP (ref 1–3.3)
LYMPHOCYTES # BLD AUTO: 2.11 K/UL — SIGNIFICANT CHANGE UP (ref 1–3.3)
LYMPHOCYTES # BLD AUTO: 24.3 % — SIGNIFICANT CHANGE UP (ref 13–44)
LYMPHOCYTES # BLD AUTO: 24.7 % — SIGNIFICANT CHANGE UP (ref 13–44)
LYMPHOCYTES # BLD AUTO: 25.2 % — SIGNIFICANT CHANGE UP (ref 13–44)
LYMPHOCYTES # BLD AUTO: 25.6 % — SIGNIFICANT CHANGE UP (ref 13–44)
MAGNESIUM SERPL-MCNC: 2.4 MG/DL — SIGNIFICANT CHANGE UP (ref 1.6–2.6)
MAGNESIUM SERPL-MCNC: 2.5 MG/DL — SIGNIFICANT CHANGE UP (ref 1.6–2.6)
MCHC RBC-ENTMCNC: 23.5 PG — LOW (ref 27–34)
MCHC RBC-ENTMCNC: 23.5 PG — LOW (ref 27–34)
MCHC RBC-ENTMCNC: 23.9 PG — LOW (ref 27–34)
MCHC RBC-ENTMCNC: 23.9 PG — LOW (ref 27–34)
MCHC RBC-ENTMCNC: 30.1 GM/DL — LOW (ref 32–36)
MCHC RBC-ENTMCNC: 30.4 GM/DL — LOW (ref 32–36)
MCHC RBC-ENTMCNC: 30.7 GM/DL — LOW (ref 32–36)
MCHC RBC-ENTMCNC: 30.8 GM/DL — LOW (ref 32–36)
MCV RBC AUTO: 77.4 FL — LOW (ref 80–100)
MCV RBC AUTO: 77.6 FL — LOW (ref 80–100)
MCV RBC AUTO: 77.8 FL — LOW (ref 80–100)
MCV RBC AUTO: 78.3 FL — LOW (ref 80–100)
MONOCYTES # BLD AUTO: 0.97 K/UL — HIGH (ref 0–0.9)
MONOCYTES # BLD AUTO: 1.36 K/UL — HIGH (ref 0–0.9)
MONOCYTES # BLD AUTO: 1.4 K/UL — HIGH (ref 0–0.9)
MONOCYTES # BLD AUTO: 1.47 K/UL — HIGH (ref 0–0.9)
MONOCYTES NFR BLD AUTO: 14 % — SIGNIFICANT CHANGE UP (ref 2–14)
MONOCYTES NFR BLD AUTO: 16.1 % — HIGH (ref 2–14)
MONOCYTES NFR BLD AUTO: 17.7 % — HIGH (ref 2–14)
MONOCYTES NFR BLD AUTO: 20.1 % — HIGH (ref 2–14)
MRSA PCR RESULT.: DETECTED
NEUTROPHILS # BLD AUTO: 3.21 K/UL — SIGNIFICANT CHANGE UP (ref 1.8–7.4)
NEUTROPHILS # BLD AUTO: 3.61 K/UL — SIGNIFICANT CHANGE UP (ref 1.8–7.4)
NEUTROPHILS # BLD AUTO: 4.05 K/UL — SIGNIFICANT CHANGE UP (ref 1.8–7.4)
NEUTROPHILS # BLD AUTO: 4.5 K/UL — SIGNIFICANT CHANGE UP (ref 1.8–7.4)
NEUTROPHILS NFR BLD AUTO: 47.4 % — SIGNIFICANT CHANGE UP (ref 43–77)
NEUTROPHILS NFR BLD AUTO: 48.7 % — SIGNIFICANT CHANGE UP (ref 43–77)
NEUTROPHILS NFR BLD AUTO: 51.7 % — SIGNIFICANT CHANGE UP (ref 43–77)
NEUTROPHILS NFR BLD AUTO: 52.3 % — SIGNIFICANT CHANGE UP (ref 43–77)
NRBC # BLD: 0 /100 WBCS — SIGNIFICANT CHANGE UP (ref 0–0)
PHOSPHATE SERPL-MCNC: 2.5 MG/DL — SIGNIFICANT CHANGE UP (ref 2.5–4.5)
PHOSPHATE SERPL-MCNC: 2.8 MG/DL — SIGNIFICANT CHANGE UP (ref 2.5–4.5)
PHOSPHATE SERPL-MCNC: 2.9 MG/DL — SIGNIFICANT CHANGE UP (ref 2.5–4.5)
PHOSPHATE SERPL-MCNC: 3.6 MG/DL — SIGNIFICANT CHANGE UP (ref 2.5–4.5)
PLATELET # BLD AUTO: 102 K/UL — LOW (ref 150–400)
PLATELET # BLD AUTO: 88 K/UL — LOW (ref 150–400)
PLATELET # BLD AUTO: 89 K/UL — LOW (ref 150–400)
PLATELET # BLD AUTO: 93 K/UL — LOW (ref 150–400)
POTASSIUM SERPL-MCNC: 3.4 MMOL/L — LOW (ref 3.5–5.3)
POTASSIUM SERPL-MCNC: 3.7 MMOL/L — SIGNIFICANT CHANGE UP (ref 3.5–5.3)
POTASSIUM SERPL-SCNC: 3.4 MMOL/L — LOW (ref 3.5–5.3)
POTASSIUM SERPL-SCNC: 3.7 MMOL/L — SIGNIFICANT CHANGE UP (ref 3.5–5.3)
PROT SERPL-MCNC: 5.2 G/DL — LOW (ref 6–8.3)
PROT SERPL-MCNC: 5.5 G/DL — LOW (ref 6–8.3)
PROT SERPL-MCNC: 5.6 G/DL — LOW (ref 6–8.3)
PROT SERPL-MCNC: 5.7 G/DL — LOW (ref 6–8.3)
PROTHROM AB SERPL-ACNC: 14.3 SEC — HIGH (ref 9.5–13)
PROTHROM AB SERPL-ACNC: 17.4 SEC — HIGH (ref 9.5–13)
PROTHROM AB SERPL-ACNC: 18.6 SEC — HIGH (ref 9.5–13)
PROTHROM AB SERPL-ACNC: 21.1 SEC — HIGH (ref 9.5–13)
RBC # BLD: 3.36 M/UL — LOW (ref 4.2–5.8)
RBC # BLD: 3.52 M/UL — LOW (ref 4.2–5.8)
RBC # BLD: 3.6 M/UL — LOW (ref 4.2–5.8)
RBC # BLD: 3.78 M/UL — LOW (ref 4.2–5.8)
RBC # FLD: 22.5 % — HIGH (ref 10.3–14.5)
RBC # FLD: 23.2 % — HIGH (ref 10.3–14.5)
RBC # FLD: 23.2 % — HIGH (ref 10.3–14.5)
RBC # FLD: 23.8 % — HIGH (ref 10.3–14.5)
S AUREUS DNA NOSE QL NAA+PROBE: DETECTED
SODIUM SERPL-SCNC: 156 MMOL/L — HIGH (ref 135–145)
SODIUM SERPL-SCNC: 158 MMOL/L — HIGH (ref 135–145)
SODIUM SERPL-SCNC: 159 MMOL/L — HIGH (ref 135–145)
SODIUM SERPL-SCNC: 159 MMOL/L — HIGH (ref 135–145)
SPECIMEN SOURCE: SIGNIFICANT CHANGE UP
WBC # BLD: 6.77 K/UL — SIGNIFICANT CHANGE UP (ref 3.8–10.5)
WBC # BLD: 6.92 K/UL — SIGNIFICANT CHANGE UP (ref 3.8–10.5)
WBC # BLD: 8.31 K/UL — SIGNIFICANT CHANGE UP (ref 3.8–10.5)
WBC # BLD: 8.69 K/UL — SIGNIFICANT CHANGE UP (ref 3.8–10.5)
WBC # FLD AUTO: 6.77 K/UL — SIGNIFICANT CHANGE UP (ref 3.8–10.5)
WBC # FLD AUTO: 6.92 K/UL — SIGNIFICANT CHANGE UP (ref 3.8–10.5)
WBC # FLD AUTO: 8.31 K/UL — SIGNIFICANT CHANGE UP (ref 3.8–10.5)
WBC # FLD AUTO: 8.69 K/UL — SIGNIFICANT CHANGE UP (ref 3.8–10.5)

## 2024-09-20 PROCEDURE — 93970 EXTREMITY STUDY: CPT | Mod: 26

## 2024-09-20 PROCEDURE — 36514 APHERESIS PLASMA: CPT

## 2024-09-20 PROCEDURE — 99232 SBSQ HOSP IP/OBS MODERATE 35: CPT | Mod: GC

## 2024-09-20 PROCEDURE — 99291 CRITICAL CARE FIRST HOUR: CPT | Mod: GC

## 2024-09-20 RX ORDER — LABETALOL HYDROCHLORIDE 200 MG/1
200 TABLET, FILM COATED ORAL ONCE
Refills: 0 | Status: COMPLETED | OUTPATIENT
Start: 2024-09-20 | End: 2024-09-20

## 2024-09-20 RX ORDER — LABETALOL HYDROCHLORIDE 200 MG/1
10 TABLET, FILM COATED ORAL ONCE
Refills: 0 | Status: COMPLETED | OUTPATIENT
Start: 2024-09-20 | End: 2024-09-20

## 2024-09-20 RX ORDER — MUPIROCIN 20 MG/G
1 OINTMENT TOPICAL
Refills: 0 | Status: COMPLETED | OUTPATIENT
Start: 2024-09-20 | End: 2024-09-24

## 2024-09-20 RX ORDER — SODIUM CHLORIDE IRRIG SOLUTION 0.9 %
1000 SOLUTION, IRRIGATION IRRIGATION
Refills: 0 | Status: DISCONTINUED | OUTPATIENT
Start: 2024-09-20 | End: 2024-09-20

## 2024-09-20 RX ORDER — SODIUM CHLORIDE IRRIG SOLUTION 0.9 %
1000 SOLUTION, IRRIGATION IRRIGATION
Refills: 0 | Status: DISCONTINUED | OUTPATIENT
Start: 2024-09-20 | End: 2024-09-22

## 2024-09-20 RX ORDER — LABETALOL HYDROCHLORIDE 200 MG/1
100 TABLET, FILM COATED ORAL THREE TIMES A DAY
Refills: 0 | Status: DISCONTINUED | OUTPATIENT
Start: 2024-09-20 | End: 2024-09-20

## 2024-09-20 RX ORDER — CHLORHEXIDINE GLUCONATE ORAL RINSE 1.2 MG/ML
15 SOLUTION DENTAL EVERY 12 HOURS
Refills: 0 | Status: DISCONTINUED | OUTPATIENT
Start: 2024-09-20 | End: 2024-09-22

## 2024-09-20 RX ORDER — HYDRALAZINE HYDROCHLORIDE 100 MG/1
10 TABLET ORAL ONCE
Refills: 0 | Status: COMPLETED | OUTPATIENT
Start: 2024-09-20 | End: 2024-09-20

## 2024-09-20 RX ORDER — VANCOMYCIN HCL-SODIUM CHLORIDE IV SOLN 1.5 GM/250ML-0.9% 1.5-0.9/25 GM/ML-%
1250 SOLUTION INTRAVENOUS EVERY 12 HOURS
Refills: 0 | Status: DISCONTINUED | OUTPATIENT
Start: 2024-09-20 | End: 2024-09-21

## 2024-09-20 RX ORDER — LABETALOL HYDROCHLORIDE 200 MG/1
200 TABLET, FILM COATED ORAL THREE TIMES A DAY
Refills: 0 | Status: DISCONTINUED | OUTPATIENT
Start: 2024-09-20 | End: 2024-09-24

## 2024-09-20 RX ORDER — ACETYLCYSTEINE
12 POWDER (GRAM) MISCELLANEOUS EVERY 24 HOURS
Refills: 0 | Status: DISCONTINUED | OUTPATIENT
Start: 2024-09-20 | End: 2024-09-21

## 2024-09-20 RX ADMIN — Medication 40 MILLIEQUIVALENT(S): at 22:55

## 2024-09-20 RX ADMIN — Medication 44.17 GRAM(S): at 04:10

## 2024-09-20 RX ADMIN — LACTULOSE 30 GRAM(S): 10 SOLUTION ORAL; RECTAL at 14:14

## 2024-09-20 RX ADMIN — Medication 50 MILLILITER(S): at 17:28

## 2024-09-20 RX ADMIN — MUPIROCIN 1 APPLICATION(S): 20 OINTMENT TOPICAL at 17:29

## 2024-09-20 RX ADMIN — CHLORHEXIDINE GLUCONATE ORAL RINSE 15 MILLILITER(S): 1.2 SOLUTION DENTAL at 05:29

## 2024-09-20 RX ADMIN — Medication 100 MILLIGRAM(S): at 14:13

## 2024-09-20 RX ADMIN — LABETALOL HYDROCHLORIDE 200 MILLIGRAM(S): 200 TABLET, FILM COATED ORAL at 21:21

## 2024-09-20 RX ADMIN — MUPIROCIN 1 APPLICATION(S): 20 OINTMENT TOPICAL at 05:29

## 2024-09-20 RX ADMIN — THIAMINE HYDROCHLORIDE 100 MILLIGRAM(S): 100 INJECTION, SOLUTION INTRAMUSCULAR; INTRAVENOUS at 11:46

## 2024-09-20 RX ADMIN — Medication 5000 UNIT(S): at 14:14

## 2024-09-20 RX ADMIN — LABETALOL HYDROCHLORIDE 100 MILLIGRAM(S): 200 TABLET, FILM COATED ORAL at 14:14

## 2024-09-20 RX ADMIN — LABETALOL HYDROCHLORIDE 10 MILLIGRAM(S): 200 TABLET, FILM COATED ORAL at 01:16

## 2024-09-20 RX ADMIN — Medication 17 GRAM(S): at 17:29

## 2024-09-20 RX ADMIN — Medication 40 MILLIEQUIVALENT(S): at 17:28

## 2024-09-20 RX ADMIN — VANCOMYCIN HCL-SODIUM CHLORIDE IV SOLN 1.5 GM/250ML-0.9% 166.67 MILLIGRAM(S): 1.5-0.9/25 SOLUTION at 17:28

## 2024-09-20 RX ADMIN — LACTULOSE 30 GRAM(S): 10 SOLUTION ORAL; RECTAL at 21:20

## 2024-09-20 RX ADMIN — LACTULOSE 30 GRAM(S): 10 SOLUTION ORAL; RECTAL at 01:16

## 2024-09-20 RX ADMIN — Medication 17 GRAM(S): at 05:29

## 2024-09-20 RX ADMIN — CHLORHEXIDINE GLUCONATE ORAL RINSE 1 APPLICATION(S): 1.2 SOLUTION DENTAL at 05:29

## 2024-09-20 RX ADMIN — Medication 17 GRAM(S): at 00:16

## 2024-09-20 RX ADMIN — CHLORHEXIDINE GLUCONATE ORAL RINSE 15 MILLILITER(S): 1.2 SOLUTION DENTAL at 17:28

## 2024-09-20 RX ADMIN — FOLIC ACID 1 MILLIGRAM(S): 1 TABLET ORAL at 15:36

## 2024-09-20 RX ADMIN — LACTULOSE 30 GRAM(S): 10 SOLUTION ORAL; RECTAL at 11:46

## 2024-09-20 RX ADMIN — Medication 44.17 GRAM(S): at 21:22

## 2024-09-20 RX ADMIN — PANTOPRAZOLE SODIUM 40 MILLIGRAM(S): 40 TABLET, DELAYED RELEASE ORAL at 17:29

## 2024-09-20 RX ADMIN — LABETALOL HYDROCHLORIDE 100 MILLIGRAM(S): 200 TABLET, FILM COATED ORAL at 05:29

## 2024-09-20 RX ADMIN — Medication 50 MILLILITER(S): at 10:46

## 2024-09-20 RX ADMIN — PANTOPRAZOLE SODIUM 40 MILLIGRAM(S): 40 TABLET, DELAYED RELEASE ORAL at 05:29

## 2024-09-20 RX ADMIN — LABETALOL HYDROCHLORIDE 10 MILLIGRAM(S): 200 TABLET, FILM COATED ORAL at 21:22

## 2024-09-20 RX ADMIN — LACTULOSE 30 GRAM(S): 10 SOLUTION ORAL; RECTAL at 17:28

## 2024-09-20 RX ADMIN — HYDRALAZINE HYDROCHLORIDE 10 MILLIGRAM(S): 100 TABLET ORAL at 03:17

## 2024-09-20 RX ADMIN — LACTULOSE 30 GRAM(S): 10 SOLUTION ORAL; RECTAL at 05:29

## 2024-09-20 RX ADMIN — HYDRALAZINE HYDROCHLORIDE 10 MILLIGRAM(S): 100 TABLET ORAL at 21:27

## 2024-09-20 RX ADMIN — Medication 5000 UNIT(S): at 21:21

## 2024-09-20 NOTE — PROGRESS NOTE ADULT - SUBJECTIVE AND OBJECTIVE BOX
Progress Note   Miriam Alexandra PGY4- GI/Hep    SUBJECTIVE: Patient seen and examined at bedside.  - Hypertensive overnight   - Needed free water for elevated Na   - Still not responding to stimuli      OBJECTIVE:    VITAL SIGNS:  ICU Vital Signs Last 24 Hrs  T(C): 37.6 (20 Sep 2024 08:00), Max: 38.4 (19 Sep 2024 18:00)  T(F): 99.7 (20 Sep 2024 08:00), Max: 101.1 (19 Sep 2024 18:00)  HR: 107 (20 Sep 2024 09:00) (100 - 125)  BP: --  BP(mean): --  ABP: 139/81 (20 Sep 2024 09:00) (97/57 - 179/98)  ABP(mean): 107 (20 Sep 2024 09:00) (75 - 133)  RR: 11 (20 Sep 2024 09:00) (10 - 25)  SpO2: 100% (20 Sep 2024 09:00) (100% - 100%)    O2 Parameters below as of 19 Sep 2024 20:00  Patient On (Oxygen Delivery Method): ventilator          Mode: CPAP with PS, FiO2: 30, PEEP: 5, PS: 5, MAP: 8, PIP: 12    09-19 @ 07:01 - 09-20 @ 07:00  --------------------------------------------------------  IN: 3681.4 mL / OUT: 3605 mL / NET: 76.4 mL    09-20 @ 07:01 - 09-20 @ 10:33  --------------------------------------------------------  IN: 0 mL / OUT: 75 mL / NET: -75 mL        PHYSICAL EXAM:    General: NAD  HEENT: NC/AT  Neck: supple  Respiratory: Regular RR, no increase in WOB  Cardiovascular: RRR  Abdomen: soft, NT/ND; +BS x4  Extremities: WWP, 2+ peripheral pulses b/l  Skin: normal color and turgor; no rash  Neurological: A&OX    MEDICATIONS:  MEDICATIONS  (STANDING):  acetylcysteine IVPB 12 Gram(s) IV Intermittent every 24 hours  cefTRIAXone   IVPB 1000 milliGRAM(s) IV Intermittent every 24 hours  chlorhexidine 0.12% Liquid 15 milliLiter(s) Oral Mucosa every 12 hours  chlorhexidine 4% Liquid 1 Application(s) Topical <User Schedule>  folic acid Injectable 1 milliGRAM(s) IV Push daily  heparin   Injectable 5000 Unit(s) SubCutaneous every 8 hours  labetalol 100 milliGRAM(s) Oral three times a day  lactated ringers. 1000 milliLiter(s) (50 mL/Hr) IV Continuous <Continuous>  lactulose Syrup 30 Gram(s) Oral every 4 hours  mupirocin 2% Nasal 1 Application(s) Both Nostrils two times a day  pantoprazole  Injectable 40 milliGRAM(s) IV Push two times a day  polyethylene glycol 3350 17 Gram(s) Oral two times a day  rifAXIMin 550 milliGRAM(s) Oral two times a day  thiamine 100 milliGRAM(s) Oral daily    MEDICATIONS  (PRN):  fentaNYL    Injectable 50 MICROGram(s) IV Push every 4 hours PRN Agitation      ALLERGIES:  Allergies    No Known Allergies    Intolerances        LABS:                        8.9    8.31  )-----------( 102      ( 20 Sep 2024 06:10 )             29.6     09-20    156[H]  |  120[H]  |  32[H]  ----------------------------<  117[H]  3.7   |  22  |  1.08    Ca    9.0      20 Sep 2024 06:09  Phos  3.6     09-20  Mg     2.5     09-20    TPro  5.6[L]  /  Alb  3.1[L]  /  TBili  6.8[H]  /  DBili  x   /  AST  97[H]  /  ALT  203[H]  /  AlkPhos  82  09-20    PT/INR - ( 20 Sep 2024 06:10 )   PT: 21.1 sec;   INR: 1.96 ratio         PTT - ( 20 Sep 2024 06:10 )  PTT:26.3 sec  Urinalysis Basic - ( 20 Sep 2024 06:09 )    Color: x / Appearance: x / SG: x / pH: x  Gluc: 117 mg/dL / Ketone: x  / Bili: x / Urobili: x   Blood: x / Protein: x / Nitrite: x   Leuk Esterase: x / RBC: x / WBC x   Sq Epi: x / Non Sq Epi: x / Bacteria: x         Progress Note   Miriam Alexandra PGY4- GI/Hep    SUBJECTIVE: Patient seen and examined at bedside.  - Hypertensive overnight   - Needed free water for elevated Na   - Still not responding to stimuli      OBJECTIVE:    VITAL SIGNS:  ICU Vital Signs Last 24 Hrs  T(C): 37.6 (20 Sep 2024 08:00), Max: 38.4 (19 Sep 2024 18:00)  T(F): 99.7 (20 Sep 2024 08:00), Max: 101.1 (19 Sep 2024 18:00)  HR: 107 (20 Sep 2024 09:00) (100 - 125)  BP: --  BP(mean): --  ABP: 139/81 (20 Sep 2024 09:00) (97/57 - 179/98)  ABP(mean): 107 (20 Sep 2024 09:00) (75 - 133)  RR: 11 (20 Sep 2024 09:00) (10 - 25)  SpO2: 100% (20 Sep 2024 09:00) (100% - 100%)    O2 Parameters below as of 19 Sep 2024 20:00  Patient On (Oxygen Delivery Method): ventilator          Mode: CPAP with PS, FiO2: 30, PEEP: 5, PS: 5, MAP: 8, PIP: 12    09-19 @ 07:01 - 09-20 @ 07:00  --------------------------------------------------------  IN: 3681.4 mL / OUT: 3605 mL / NET: 76.4 mL    09-20 @ 07:01 - 09-20 @ 10:33  --------------------------------------------------------  IN: 0 mL / OUT: 75 mL / NET: -75 mL        PHYSICAL EXAM:    General: NAD  HEENT: NC/AT  Neck: supple  Respiratory: Intubated   Cardiovascular: RRR  Abdomen: soft, NT/ND; +BS x4  Extremities: WWP, 2+ peripheral pulses b/l  Skin: normal color and turgor; no rash  Neurological: Obtunded    MEDICATIONS:  MEDICATIONS  (STANDING):  acetylcysteine IVPB 12 Gram(s) IV Intermittent every 24 hours  cefTRIAXone   IVPB 1000 milliGRAM(s) IV Intermittent every 24 hours  chlorhexidine 0.12% Liquid 15 milliLiter(s) Oral Mucosa every 12 hours  chlorhexidine 4% Liquid 1 Application(s) Topical <User Schedule>  folic acid Injectable 1 milliGRAM(s) IV Push daily  heparin   Injectable 5000 Unit(s) SubCutaneous every 8 hours  labetalol 100 milliGRAM(s) Oral three times a day  lactated ringers. 1000 milliLiter(s) (50 mL/Hr) IV Continuous <Continuous>  lactulose Syrup 30 Gram(s) Oral every 4 hours  mupirocin 2% Nasal 1 Application(s) Both Nostrils two times a day  pantoprazole  Injectable 40 milliGRAM(s) IV Push two times a day  polyethylene glycol 3350 17 Gram(s) Oral two times a day  rifAXIMin 550 milliGRAM(s) Oral two times a day  thiamine 100 milliGRAM(s) Oral daily    MEDICATIONS  (PRN):  fentaNYL    Injectable 50 MICROGram(s) IV Push every 4 hours PRN Agitation      ALLERGIES:  Allergies    No Known Allergies    Intolerances        LABS:                        8.9    8.31  )-----------( 102      ( 20 Sep 2024 06:10 )             29.6     09-20    156[H]  |  120[H]  |  32[H]  ----------------------------<  117[H]  3.7   |  22  |  1.08    Ca    9.0      20 Sep 2024 06:09  Phos  3.6     09-20  Mg     2.5     09-20    TPro  5.6[L]  /  Alb  3.1[L]  /  TBili  6.8[H]  /  DBili  x   /  AST  97[H]  /  ALT  203[H]  /  AlkPhos  82  09-20    PT/INR - ( 20 Sep 2024 06:10 )   PT: 21.1 sec;   INR: 1.96 ratio         PTT - ( 20 Sep 2024 06:10 )  PTT:26.3 sec  Urinalysis Basic - ( 20 Sep 2024 06:09 )    Color: x / Appearance: x / SG: x / pH: x  Gluc: 117 mg/dL / Ketone: x  / Bili: x / Urobili: x   Blood: x / Protein: x / Nitrite: x   Leuk Esterase: x / RBC: x / WBC x   Sq Epi: x / Non Sq Epi: x / Bacteria: x

## 2024-09-20 NOTE — PROGRESS NOTE ADULT - ATTENDING COMMENTS
- got PLEX 9/17-19, still comatose, pupils were slightly asymmetric yesterday on my exam, symmetric today, reactive, still intubated, not on dialysis, not on pressors  - ammonia 74  - got free water for hypernatremia, sBP was 170 at midnight, now 140s  - WBC 8.3, Hb 8.9, MCV 78.3, , INR 1.96 after PLEX  - Na 156, K 3.7, BUN 32, creat 1.08  - LFTs 6.8/82, 97/203  - Meds: LR, CTX, heparin s.c., rifaximin, labetalol 100 tid, lactulose 30g q4, thiamine, pantoprazole 40 bid, diet: glucerna    Assessment: alcoholic steatohepatitis, acute liver failure due to shock liver (sBP 60s at OSH), brain edema resolved on MRI 9/18, still obtunded, ROSA init. creat 1.9 resolved, hypernatremia  Plan: monitor MELD labs and ammonia level, decide tomorrow on possible further plasma exchange or CRRT  DIscussed with MICU attending.

## 2024-09-20 NOTE — PROGRESS NOTE ADULT - ASSESSMENT
59 years old male with h/o chronic ETOH abuse and dependence (1 bottle of Vodka a day) with long standing hx of alcohol withdrawal and DTs with frequent hospital/ICU admissions, alcoholic gastritis/esophagitis, PUD, HLD, hx of hypoxic respiratory failure requiring intubation due to aspiration PNA (most recent intubation Aril 2020),  staph PNA, COVID-19 infection Dec 2020 presented with abd pain at the OSH.    #Decompensated alcoholic cirrhosis  #GUNJAN  - Calculated MELD3 score on 9/16 34; 9/17 32; 9/19 27; 9/20 22  - Presented with AMS at OSH. Had multiple hospitalizations in the past for alcohol withdrawal and DT per records. Unclear last alcohol drink. His alcohol level this admission was <10 and Tylenol level was 42 on admission. Now intubated with findings of cerebral edema and concern for GUNJAN.   - Could be due to Tylenol use in the setting of underlying liver disease causing acute elevation in his liver enzymes. However, could also be due to ischemia as he was hypotensive at OSH, concerned for possible sepsis. GB distended concerning for acute cholecystitis, no other sources of infection.  - Other lab serologies: CMV, EBV PCR neg, Hep B/C neg.     Recommendations:   - Plan for third PLEX today  - Q6 labs - ammonia, CMP, phos, INR  - C/w D5 and monitor sugar levels   - Aggressive electrolyte repletion   - Keep MAP >65   - Continue with NAC drip  - Continue with lactulose enema, goal 3-4 BMs per day.   - Patient is unlikely a candidate for LT due to multiple admissions in the past with alcohol withdrawal.     All recommendations are tentative until note is attested by an attending.     Miriam Cifuentes MD  Gastroenterology/Hepatology Fellow, PGY-5  Please contact via TEAMS    NON-URGENT CONSULTS:  Please email [email or web address]

## 2024-09-20 NOTE — CHART NOTE - NSCHARTNOTEFT_GEN_A_CORE
60 y/o M with EtoH abuse presented with elevated LFTs 2/2 acetaminophen toxicity and ? multifactorial. On 9/17 due to change in mental status, the CT MRI brain showed global hypoxic ischemic changes and cerebral edema, possibly 2/2 hyperammonemia. On mannitol and hypertonic saline. Blood bank was consulted post shiley placement for PLEX.    PLEX #1 on 9/17-9/18 using 1.5 plasma volume replacement (donor plasma). 4g lonnie gluconate given to prevent citrate toxicity effects. Procedure well tolerated.     PLEX#2 9/19/24 today. Labs and notes reviewed. Pt still remain intubated. 1.5 PV with donor plasma as replacement fluid. Procedure tolerated well.    PLEX#3 today. Labs and notes reviewed. 1.5 PV with donor plasma as replacement fluid. Procedure tolerated well.

## 2024-09-20 NOTE — PROGRESS NOTE ADULT - ATTENDING COMMENTS
Patient is a 59 year old M with PMHx ETOH abuse with frequent admissions for withdrawal in the past initially presents to OSH for ETOH withdrawal, Patient with transaminitis, initially mild. Possible episode of hypotension with LFT--> 17K, elevated ammonia, poor mental status.     Patient transferred to [facility] for liver failure and possible transplant evaluation. Reported elevated tylenol level started on NAC. LFTs down trending but will with very high meld, as well as coagulopathy.      LFT improving, coagulopathy also improved. Today with worsening mental status. Less responsive. Ammonia overnight was 80. Having 5-6 BM with lactulose Enemas.     Patient now intubated for worsening MS and airway protection. CT scan showing cerebral edema. Concerning for ischemic injury from hypotension vs cerebral edema from liver failure/ammonia    Pending plasma exchange, mannitol and hypersonic saline. EEG in place, pending MRI.   On mannitol x2 doses. MRI of brain without edema.   On day 3/3 of PLEX.    Today opens eyes to stimuli but no purposeful movements.     # ETOH abuse  # Acute liver failure  # Hepatic encephalopathy  # Coagulopathy  # acute hypoxemic respiratory failure   # cerebral edema  - Acute liver failure, most likely 2/2 to ischemic vs etoh induced. LFT initially > 17K now down trending.   - C/W nac, Repeat Tylenol level WNL. F/U with hep recs  - Trend MELD, LFTs  - Likely not a candidate for Liver transplant  -  Now on oral lactulose and miralax. Monitor ammonia levels. Uptitrate as needed.  - Given cerebral edema, 2/2 to liver failure vs anoxia 2/2 to hypotension. Will plan for PLEX, Mannitol and hypertonic. Will set vent to alkaosis.   - MRI now with improved edema. S/P mannitol, will d/c given > 48 hours as well as hypertonic saline.   - EEG without sz, keep of sedation to assess for mental status.   - C/W vent, Tolerated PC better, Adjust based on blood gas. PS as tolerated.    - Caoguloapthy improving s/p cryo. Continue to monitor.   - DVT ppx- SCD 2/2 to elevated INR  - Dispo- full code.  D/W Son and Daughter, overall poor prognosis given CTH findings. Will have ongoing GOC. Palliative care consulted.

## 2024-09-20 NOTE — PROGRESS NOTE ADULT - SUBJECTIVE AND OBJECTIVE BOX
INTERVAL HPI/OVERNIGHT EVENTS: Patients Na was 162 yesterday so he was started on free water. Also received Hydralazine and Labetalol for hypertension.     SUBJECTIVE: Patient seen and examined at bedside.       VITAL SIGNS:  ICU Vital Signs Last 24 Hrs  T(C): 37.6 (20 Sep 2024 04:00), Max: 38.4 (19 Sep 2024 18:00)  T(F): 99.7 (20 Sep 2024 04:00), Max: 101.1 (19 Sep 2024 18:00)  HR: 107 (20 Sep 2024 07:00) (97 - 125)  BP: --  BP(mean): --  ABP: 126/77 (20 Sep 2024 07:00) (97/57 - 179/98)  ABP(mean): 98 (20 Sep 2024 07:00) (75 - 133)  RR: 21 (20 Sep 2024 07:00) (10 - 25)  SpO2: 100% (20 Sep 2024 07:00) (100% - 100%)    O2 Parameters below as of 19 Sep 2024 20:00  Patient On (Oxygen Delivery Method): ventilator          Mode: CPAP with PS, FiO2: 30, PEEP: 5, PS: 5, MAP: 8, PIP: 11  Plateau pressure:   P/F ratio:     09-19 @ 07:01  -  09-20 @ 07:00  --------------------------------------------------------  IN: 3681.4 mL / OUT: 3530 mL / NET: 151.4 mL      CAPILLARY BLOOD GLUCOSE      POCT Blood Glucose.: 142 mg/dL (19 Sep 2024 05:41)    ECG:    PHYSICAL EXAM:    HEAD:  Atraumatic, Normocephalic  EYES: EOMI, PERRLA, conjunctiva and sclera clear  ENT: Moist mucous membranes  NECK: Supple, No elevated JVP.  CHEST/LUNG: mechanical ventilation sounds   HEART: Regular rate and rhythm; No murmurs, rubs, or gallops  ABDOMEN: Bowel sounds present; Soft, nontender, nondistended  EXTREMITIES:  2+ Peripheral Pulses, brisk capillary refill. No clubbing, cyanosis, or edema  NERVOUS SYSTEM:  A&Ox0, not responding to any stimuli- sedated  SKIN: No rashes or lesions    MEDICATIONS:  MEDICATIONS  (STANDING):  acetylcysteine IVPB 12 Gram(s) IV Intermittent every 24 hours  cefTRIAXone   IVPB 1000 milliGRAM(s) IV Intermittent every 24 hours  chlorhexidine 0.12% Liquid 15 milliLiter(s) Oral Mucosa every 12 hours  chlorhexidine 4% Liquid 1 Application(s) Topical <User Schedule>  folic acid Injectable 1 milliGRAM(s) IV Push daily  labetalol 100 milliGRAM(s) Oral three times a day  lactulose Syrup 30 Gram(s) Oral every 4 hours  mupirocin 2% Nasal 1 Application(s) Both Nostrils two times a day  pantoprazole  Injectable 40 milliGRAM(s) IV Push two times a day  polyethylene glycol 3350 17 Gram(s) Oral <User Schedule>  thiamine 100 milliGRAM(s) Oral daily    MEDICATIONS  (PRN):  fentaNYL    Injectable 50 MICROGram(s) IV Push every 4 hours PRN Agitation      ALLERGIES:  Allergies    No Known Allergies    Intolerances        LABS:                        8.9    8.31  )-----------( 102      ( 20 Sep 2024 06:10 )             29.6     09-20    156[H]  |  120[H]  |  32[H]  ----------------------------<  117[H]  3.7   |  22  |  1.08    Ca    9.0      20 Sep 2024 06:09  Phos  3.6     09-20  Mg     2.5     09-20    TPro  5.6[L]  /  Alb  3.1[L]  /  TBili  6.8[H]  /  DBili  x   /  AST  97[H]  /  ALT  203[H]  /  AlkPhos  82  09-20    PT/INR - ( 20 Sep 2024 06:10 )   PT: 21.1 sec;   INR: 1.96 ratio         PTT - ( 20 Sep 2024 06:10 )  PTT:26.3 sec  Urinalysis Basic - ( 20 Sep 2024 06:09 )    Color: x / Appearance: x / SG: x / pH: x  Gluc: 117 mg/dL / Ketone: x  / Bili: x / Urobili: x   Blood: x / Protein: x / Nitrite: x   Leuk Esterase: x / RBC: x / WBC x   Sq Epi: x / Non Sq Epi: x / Bacteria: x        RADIOLOGY & ADDITIONAL TESTS: Reviewed.   INTERVAL HPI/OVERNIGHT EVENTS: Patients Na was 162 yesterday so he was started on free water. Also received Hydralazine and Labetalol for hypertension.     SUBJECTIVE: Patient seen and examined at bedside. Remains non-responsive to stimuli.       VITAL SIGNS:  ICU Vital Signs Last 24 Hrs  T(C): 37.6 (20 Sep 2024 04:00), Max: 38.4 (19 Sep 2024 18:00)  T(F): 99.7 (20 Sep 2024 04:00), Max: 101.1 (19 Sep 2024 18:00)  HR: 107 (20 Sep 2024 07:00) (97 - 125)  BP: --  BP(mean): --  ABP: 126/77 (20 Sep 2024 07:00) (97/57 - 179/98)  ABP(mean): 98 (20 Sep 2024 07:00) (75 - 133)  RR: 21 (20 Sep 2024 07:00) (10 - 25)  SpO2: 100% (20 Sep 2024 07:00) (100% - 100%)    O2 Parameters below as of 19 Sep 2024 20:00  Patient On (Oxygen Delivery Method): ventilator          Mode: CPAP with PS, FiO2: 30, PEEP: 5, PS: 5, MAP: 8, PIP: 11  Plateau pressure:   P/F ratio:     09-19 @ 07:01  -  09-20 @ 07:00  --------------------------------------------------------  IN: 3681.4 mL / OUT: 3530 mL / NET: 151.4 mL      CAPILLARY BLOOD GLUCOSE      POCT Blood Glucose.: 142 mg/dL (19 Sep 2024 05:41)    ECG:    PHYSICAL EXAM:    HEAD:  Atraumatic, Normocephalic  EYES: EOMI, PERRLA, conjunctiva and sclera clear  ENT: Moist mucous membranes  NECK: Supple, No elevated JVP.  CHEST/LUNG: mechanical ventilation sounds   HEART: Regular rate and rhythm; No murmurs, rubs, or gallops  ABDOMEN: Bowel sounds present; Soft, nontender, nondistended  EXTREMITIES:  2+ Peripheral Pulses, brisk capillary refill. No clubbing, cyanosis, or edema  NERVOUS SYSTEM:  A&Ox0, not responding to any stimuli- sedated  SKIN: No rashes or lesions    MEDICATIONS:  MEDICATIONS  (STANDING):  acetylcysteine IVPB 12 Gram(s) IV Intermittent every 24 hours  cefTRIAXone   IVPB 1000 milliGRAM(s) IV Intermittent every 24 hours  chlorhexidine 0.12% Liquid 15 milliLiter(s) Oral Mucosa every 12 hours  chlorhexidine 4% Liquid 1 Application(s) Topical <User Schedule>  folic acid Injectable 1 milliGRAM(s) IV Push daily  labetalol 100 milliGRAM(s) Oral three times a day  lactulose Syrup 30 Gram(s) Oral every 4 hours  mupirocin 2% Nasal 1 Application(s) Both Nostrils two times a day  pantoprazole  Injectable 40 milliGRAM(s) IV Push two times a day  polyethylene glycol 3350 17 Gram(s) Oral <User Schedule>  thiamine 100 milliGRAM(s) Oral daily    MEDICATIONS  (PRN):  fentaNYL    Injectable 50 MICROGram(s) IV Push every 4 hours PRN Agitation      ALLERGIES:  Allergies    No Known Allergies    Intolerances        LABS:                        8.9    8.31  )-----------( 102      ( 20 Sep 2024 06:10 )             29.6     09-20    156[H]  |  120[H]  |  32[H]  ----------------------------<  117[H]  3.7   |  22  |  1.08    Ca    9.0      20 Sep 2024 06:09  Phos  3.6     09-20  Mg     2.5     09-20    TPro  5.6[L]  /  Alb  3.1[L]  /  TBili  6.8[H]  /  DBili  x   /  AST  97[H]  /  ALT  203[H]  /  AlkPhos  82  09-20    PT/INR - ( 20 Sep 2024 06:10 )   PT: 21.1 sec;   INR: 1.96 ratio         PTT - ( 20 Sep 2024 06:10 )  PTT:26.3 sec  Urinalysis Basic - ( 20 Sep 2024 06:09 )    Color: x / Appearance: x / SG: x / pH: x  Gluc: 117 mg/dL / Ketone: x  / Bili: x / Urobili: x   Blood: x / Protein: x / Nitrite: x   Leuk Esterase: x / RBC: x / WBC x   Sq Epi: x / Non Sq Epi: x / Bacteria: x        RADIOLOGY & ADDITIONAL TESTS: Reviewed.

## 2024-09-20 NOTE — PROGRESS NOTE ADULT - ASSESSMENT
Patient is a 59 year old M with PMHx ETOH abuse with frequent admissions for withdrawal & gastritis admitted to ICU for acute on chronic decompensated liver failure & alcohol withdrawal. Patient now intubated for airway protection. Started on PLEX for Acute liver failure. Pending further evaluation regarding AMS.    # Neuro:  Acute Metabolic Encephalopathy   -Likely secondary to elevated ammonia level along with liver failure.   -CT head showing concerns for cerebral edema or global hypoxic injury  -CTA and CTV- appear to have no major findings  -MRI did not show any acute issues  -vEEG- no major findings  Plan  -Neurology on board  -On PLEX- got 1st round 9/17 and 2nd 9/19. Due for 3rd on 9/20   -Continue Lactulose and Miralax- ammonia still elevated  -Will consider Rifaxamin        #Resp:   -Patient intubated for airway protection.     #CV:  -Patient not on any vasopressors at this time. Hemodynamically stable.     #GI:  // Acute Decompensated Liver Failure - patient with long history of etoh abuse with evidence of hepatic steatosis on imaging.  Appears to be related to a prior ischemic episode   - RUQ US: Distended gallbladder with wall thickening up to 5 mm and trace pericholecystic fluid without evidence of cholelithiasis or biliary dilatation. Negative sonographic Gibson's sign.   - CTAP: Nondistended gallbladder with diffuse nonspecific wall edema. Severe fatty liver.  - labs significant for thrombocytopenia, transaminitis, anemia, elevated bilirubin & decreased fibrinogen, elevated lactate.   Plan  -Continue Thiamine and Folate  -Continue NAC   -On PLEX  -Hepatology consulted- ordered Lab work recommended by them   -Continue Lactulose     #Diet  -Started on tube feeds       #Renal:  -No acute issues. Will monitor Cr and urine output.       #ID:  - afebrile, wbc nl  -On ceftriaxone    #Heme:  Concern for DIC   - elevated coags, d-dimer & fibrinogen 40  - S/P 3u cryo & 1 FFP in setting of DIC and liver failure-  - FU CBC. tranfuse for Hg <7   Fibrinogen now improved  Plan  -Will continue to monitor    #Elevated INR  -INR now improved to 1.96  Plan  -Will monitor    #Endo:  - cont to monitor FS Q6H        Patient is a 59 year old M with PMHx ETOH abuse with frequent admissions for withdrawal & gastritis admitted to ICU for acute on chronic decompensated liver failure & alcohol withdrawal. Patient now intubated for airway protection. Started on PLEX for Acute liver failure. Pending further evaluation regarding AMS.    # Neuro:  Acute Metabolic Encephalopathy   -Likely secondary to elevated ammonia level along with liver failure.   -CT head showing concerns for cerebral edema or global hypoxic injury  -CTA and CTV- appear to have no major findings  -MRI did not show any acute issues  -vEEG- no major findings  Plan  -Neurology on board  -On PLEX- got 1st round 9/17 and 2nd 9/19. Due for 3rd on 9/20   -Continue Lactulose and Miralax- ammonia still elevated  -Will start Rifaxamin  -If no improvement- will repeat vEEG on Monday 9/23      #Resp:   -Patient intubated for airway protection.   -Continue on Pressure control     #CV:  -Patient not on any vasopressors at this time. Hemodynamically stable.     #GI:  // Acute Decompensated Liver Failure - patient with long history of etoh abuse with evidence of hepatic steatosis on imaging.  Appears to be related to a prior ischemic episode   - RUQ US: Distended gallbladder with wall thickening up to 5 mm and trace pericholecystic fluid without evidence of cholelithiasis or biliary dilatation. Negative sonographic Gibson's sign.   - CTAP: Nondistended gallbladder with diffuse nonspecific wall edema. Severe fatty liver.  - labs significant for thrombocytopenia, transaminitis, anemia, elevated bilirubin & decreased fibrinogen, elevated lactate.   Plan  -Continue Thiamine and Folate  -Continue NAC   -On PLEX  -Hepatology consulted- ordered Lab work recommended by them   -Continue Lactulose and Miralax  -Started Rifaxamin    #Diet  -Started on tube feeds       #Renal:  -No acute issues. Will monitor Cr and urine output.       #ID:  - afebrile, wbc nl  -On ceftriaxone    #Heme:  Concern for DIC   - elevated coags, d-dimer & fibrinogen 40  - S/P 3u cryo & 1 FFP in setting of DIC and liver failure-  - FU CBC. tranfuse for Hg <7   Fibrinogen now improved  Plan  -Will continue to monitor    #Elevated INR  -INR now improved to 1.96  Plan  -Will monitor    #DVT ppx  -Started Heparin 5K tid    #Endo:  - cont to monitor FS Q6H

## 2024-09-20 NOTE — CHART NOTE - NSCHARTNOTEFT_GEN_A_CORE
NUTRITION FOLLOW UP NOTE    PATIENT SEEN FOR: refeeding risk follow up    SOURCE: [] Patient  [x] Current Medical Record  [x] RN  [] Family/support person at bedside  [x] Patient unavailable/inappropriate  [x] Other: Interdisciplinary Rounds     CHART REVIEWED/EVENTS NOTED.  [] No changes to nutrition care plan to note  [x] Nutrition Status:  - ETOH abuse + Acute on chronic decompensated liver failure  -> On PLEX    DIET ORDER:   Diet, NPO with Tube Feed:   Tube Feeding Modality: Orogastric  Glucerna 1.5 Lester (GLUCERNA1.5RTH)  Total Volume for 24 Hours (mL): 1320  Continuous  Starting Tube Feed Rate {mL per Hour}: 10  Increase Tube Feed Rate by (mL): 10     Every 4 hours  Until Goal Tube Feed Rate (mL per Hour): 55  Tube Feed Duration (in Hours): 24  Tube Feed Start Time: 10:00 (24)  Free water: 350 mL q 6 hours     CURRENT DIET ORDER IS:  [] Appropriate:  [] Inadequate:  [x] Other: See recommendations below    NUTRITION INTAKE/PROVISION:  [] PO:  [x] Enteral Nutrition:  Order Provides: 1320 ml formula, 1980 kcals, 109 gm protein, and 1002 mL free water. Meets 24 kcals/kG and 1.3 gm protein/kG based on dosing wt 83 kG.   Current Pump Rate: 30 mL/hr  Feeds started at trickle  and advancing slowly to goal; held for MRI  [] Parenteral Nutrition:    ANTHROPOMETRICS:  Drug Dosing Weight  Height (cm): 182.9 (15 Sep 2024 07:00); Ht per HIE: 170.18 cm  Weight (kg): 83 (15 Sep 2024 08:29)  BMI (kg/m2): 24.8 (15 Sep 2024 08:29); based on HIE ht: 28.6     Weights:   Daily Weight in k.1 (), 88.4 (), 82.5 ()   Drastic wt fluctuations noted and possibly fluid shifts vs inaccurate bed scale  RD will continue to trend as new wts available/able.     NUTRITIONALLY PERTINENT MEDICATIONS:  MEDICATIONS  (STANDING):  acetylcysteine IVPB 12 Gram(s) IV Intermittent every 24 hours  cefTRIAXone   IVPB 1000 milliGRAM(s) IV Intermittent every 24 hours  folic acid Injectable 1 milliGRAM(s) IV Push daily  labetalol 100 milliGRAM(s) Oral three times a day  lactulose Syrup 30 Gram(s) Oral every 4 hours  pantoprazole  Injectable 40 milliGRAM(s) IV Push two times a day  polyethylene glycol 3350 17 Gram(s) Oral <User Schedule>  thiamine 100 milliGRAM(s) Oral daily    NUTRITIONALLY PERTINENT LABS:   Na156 mmol/L[H] Glu 117 mg/dL[H] K+ 3.7 mmol/L Cr  1.08 mg/dL BUN 32 mg/dL[H]    Phos 3.6 mg/dL    Alb 3.1 g/dL[L]    U/L[H] AST 97 U/L[H] Alkaline Phosphatase 82 U/L  24 @ 11:30 a1c 5.5    A1C with Estimated Average Glucose Result: 5.5 % (24 @ 11:30)  A1C with Estimated Average Glucose Result: 5.4 % (24 @ 07:05)    NUTRITIONALLY PERTINENT MEDICATIONS/LABS:  [x] Reviewed  [x] Relevant notes on medications/labs:  - Ordered for folic acid and thiamine for Hx etoh use  - Last drawn K+ and Phos WNL (intermittently low)  - Hypernatremia; ordered for free water    EDEMA:  [x] Reviewed  [x] Relevant notes: 2+ dependent 3+ left hand; right hand    GI/ I&O:  [x] Reviewed  [] Relevant notes:  [x] Other: Liquid green stool    SKIN:   [x] No pressure injuries documented, per nursing flowsheet  [] Pressure injury previously noted  [] Change in pressure injury documentation:  [] Other:    ESTIMATED NEEDS:  Based on dosing wt 83 kg  Energy: (23-28 kcals/kg) 2973-1279 kcal/day   Protein: (1.2-1.4 g/kg) 100-116 g/day  Fluid needs deferred to provider    NUTRITION DIAGNOSIS:  [x] Prior Dx: Increased protein-energy needs   [] New Dx:    EDUCATION:  [] Yes:  [x] Not appropriate/warranted    NUTRITION CARE PLAN:  1. Diet: Glucerna 1.5 at 10 mL/hr increasing only as tolerated and electrolytes WNL to goal rate 55 mL/hr x24 hours to provide total volume 1320 mL, 1980 kcals, 109 gm protein, and 1002 mL free water. Meets 24 kcals/kG and 1.3 gm protein/kG based on dosing wt 83 kG. 24 hour feeds for refeeding risk.   2. Multivitamin/mineral supplementation: As medically feasible, continue to provide thiamine and folic acid + add multivitamin as able.     [] Achieved - Continue current nutrition intervention(s)  [] Current medical condition precludes nutrition intervention at this time.    MONITORING AND EVALUATION:   RD remains available upon request and will follow up per protocol.    Sanjana Davalos, MS, RD, CDN, CNSC, CDCES TEAMS NUTRITION FOLLOW UP NOTE    PATIENT SEEN FOR: refeeding risk follow up    SOURCE: [] Patient  [x] Current Medical Record  [x] RN  [] Family/support person at bedside  [x] Patient unavailable/inappropriate  [x] Other: Interdisciplinary Rounds     CHART REVIEWED/EVENTS NOTED.  [] No changes to nutrition care plan to note  [x] Nutrition Status:  - ETOH abuse + Acute on chronic decompensated liver failure  -> On PLEX    DIET ORDER:   Diet, NPO with Tube Feed:   Tube Feeding Modality: Orogastric  Glucerna 1.5 Lester (GLUCERNA1.5RTH)  Total Volume for 24 Hours (mL): 1320  Continuous  Starting Tube Feed Rate {mL per Hour}: 10  Increase Tube Feed Rate by (mL): 10     Every 4 hours  Until Goal Tube Feed Rate (mL per Hour): 55  Tube Feed Duration (in Hours): 24  Tube Feed Start Time: 10:00 (24)  Free water: 350 mL q 6 hours     CURRENT DIET ORDER IS:  [] Appropriate:  [] Inadequate:  [x] Other: See recommendations below    NUTRITION INTAKE/PROVISION:  [] PO:  [x] Enteral Nutrition:  Order Provides: 1320 ml formula, 1980 kcals, 109 gm protein, and 1002 mL free water. Meets 24 kcals/kG and 1.3 gm protein/kG based on dosing wt 83 kG.   Current Pump Rate: 30 mL/hr  Feeds started at trickle  and advancing slowly to goal; held for MRI  [] Parenteral Nutrition:    ANTHROPOMETRICS:  Drug Dosing Weight  Height (cm): 182.9 (15 Sep 2024 07:00); Ht per HIE: 170.18 cm  Weight (kg): 83 (15 Sep 2024 08:29)  BMI (kg/m2): 24.8 (15 Sep 2024 08:29); based on HIE ht: 28.6     Weights:   Daily Weight in k.1 (), 88.4 (), 82.5 ()   Drastic wt fluctuations noted and possibly fluid shifts vs inaccurate bed scale  RD will continue to trend as new wts available/able.     NUTRITIONALLY PERTINENT MEDICATIONS:  MEDICATIONS  (STANDING):  acetylcysteine IVPB 12 Gram(s) IV Intermittent every 24 hours  cefTRIAXone   IVPB 1000 milliGRAM(s) IV Intermittent every 24 hours  folic acid Injectable 1 milliGRAM(s) IV Push daily  labetalol 100 milliGRAM(s) Oral three times a day  lactulose Syrup 30 Gram(s) Oral every 4 hours  pantoprazole  Injectable 40 milliGRAM(s) IV Push two times a day  polyethylene glycol 3350 17 Gram(s) Oral <User Schedule>  thiamine 100 milliGRAM(s) Oral daily    NUTRITIONALLY PERTINENT LABS:   Na156 mmol/L[H] Glu 117 mg/dL[H] K+ 3.7 mmol/L Cr  1.08 mg/dL BUN 32 mg/dL[H]    Phos 3.6 mg/dL    Alb 3.1 g/dL[L]    U/L[H] AST 97 U/L[H] Alkaline Phosphatase 82 U/L  24 @ 11:30 a1c 5.5    A1C with Estimated Average Glucose Result: 5.5 % (24 @ 11:30)  A1C with Estimated Average Glucose Result: 5.4 % (24 @ 07:05)    NUTRITIONALLY PERTINENT MEDICATIONS/LABS:  [x] Reviewed  [x] Relevant notes on medications/labs:  - Ordered for folic acid and thiamine for Hx etoh use  - Last drawn K+ and Phos WNL (intermittently low)  - Hypernatremia; ordered for free water    EDEMA:  [x] Reviewed  [x] Relevant notes: 2+ dependent 3+ left hand; right hand    GI/ I&O:  [x] Reviewed  [] Relevant notes:  [x] Other: Liquid green stool    SKIN:   [x] No pressure injuries documented, per nursing flowsheet  [] Pressure injury previously noted  [] Change in pressure injury documentation:  [] Other:    ESTIMATED NEEDS:  Based on dosing wt 83 kg  Energy: (23-28 kcals/kg) 9792-0897 kcal/day   Protein: (1.2-1.4 g/kg) 100-116 g/day  Fluid needs deferred to provider    NUTRITION DIAGNOSIS:  [x] Prior Dx: Increased protein-energy needs   [] New Dx:    EDUCATION:  [] Yes:  [x] Not appropriate/warranted    NUTRITION CARE PLAN:  1. Diet: Glucerna 1.5 at 30 mL/hr increasing only as tolerated and electrolytes WNL to goal rate 55 mL/hr x24 hours to provide total volume 1320 mL, 1980 kcals, 109 gm protein, and 1002 mL free water. Meets 24 kcals/kG and 1.3 gm protein/kG based on dosing wt 83 kG. 24 hour feeds for refeeding risk.   2. Multivitamin/mineral supplementation: As medically feasible, continue to provide thiamine and folic acid + add multivitamin as able.     [] Achieved - Continue current nutrition intervention(s)  [] Current medical condition precludes nutrition intervention at this time.    MONITORING AND EVALUATION:   RD remains available upon request and will follow up per protocol.    Sanjana Davalos, MS, RD, CDN, CNSC, CDCES TEAMS NUTRITION FOLLOW UP NOTE    PATIENT SEEN FOR: refeeding risk follow up    SOURCE: [] Patient  [x] Current Medical Record  [x] RN  [] Family/support person at bedside  [x] Patient unavailable/inappropriate  [x] Other: Interdisciplinary Rounds     CHART REVIEWED/EVENTS NOTED.  [] No changes to nutrition care plan to note  [x] Nutrition Status:  - ETOH abuse + Acute on chronic decompensated liver failure  -> On PLEX    DIET ORDER:   Diet, NPO with Tube Feed:   Tube Feeding Modality: Orogastric  Glucerna 1.5 Lester (GLUCERNA1.5RTH)  Total Volume for 24 Hours (mL): 1320  Continuous  Starting Tube Feed Rate {mL per Hour}: 10  Increase Tube Feed Rate by (mL): 10     Every 4 hours  Until Goal Tube Feed Rate (mL per Hour): 55  Tube Feed Duration (in Hours): 24  Tube Feed Start Time: 10:00 (24)  Free water: 350 mL q 6 hours     CURRENT DIET ORDER IS:  [] Appropriate:  [] Inadequate:  [x] Other: See recommendations below    NUTRITION INTAKE/PROVISION:  [] PO:  [x] Enteral Nutrition:  Order Provides: 1320 ml formula, 1980 kcals, 109 gm protein, and 1002 mL free water. Meets 24 kcals/kG and 1.3 gm protein/kG based on dosing wt 83 kG.   Current Pump Rate: 30 mL/hr  Feeds started at trickle  and advancing slowly to goal; held for MRI  [] Parenteral Nutrition:    ANTHROPOMETRICS:  Drug Dosing Weight  Height (cm): 182.9 (15 Sep 2024 07:00); Ht per HIE: 170.18 cm  Weight (kg): 83 (15 Sep 2024 08:29)  BMI (kg/m2): 24.8 (15 Sep 2024 08:29); based on HIE ht: 28.6     Weights:   Daily Weight in k.1 (), 88.4 (), 82.5 ()   Drastic wt fluctuations noted and possibly fluid shifts vs inaccurate bed scale  RD will continue to trend as new wts available/able.     NUTRITIONALLY PERTINENT MEDICATIONS:  MEDICATIONS  (STANDING):  acetylcysteine IVPB 12 Gram(s) IV Intermittent every 24 hours  cefTRIAXone   IVPB 1000 milliGRAM(s) IV Intermittent every 24 hours  folic acid Injectable 1 milliGRAM(s) IV Push daily  labetalol 100 milliGRAM(s) Oral three times a day  lactulose Syrup 30 Gram(s) Oral every 4 hours  pantoprazole  Injectable 40 milliGRAM(s) IV Push two times a day  polyethylene glycol 3350 17 Gram(s) Oral <User Schedule>  thiamine 100 milliGRAM(s) Oral daily    NUTRITIONALLY PERTINENT LABS:   Na156 mmol/L[H] Glu 117 mg/dL[H] K+ 3.7 mmol/L Cr  1.08 mg/dL BUN 32 mg/dL[H]    Phos 3.6 mg/dL    Alb 3.1 g/dL[L]    U/L[H] AST 97 U/L[H] Alkaline Phosphatase 82 U/L  24 @ 11:30 a1c 5.5    A1C with Estimated Average Glucose Result: 5.5 % (24 @ 11:30)  A1C with Estimated Average Glucose Result: 5.4 % (24 @ 07:05)    NUTRITIONALLY PERTINENT MEDICATIONS/LABS:  [x] Reviewed  [x] Relevant notes on medications/labs:  - Ordered for folic acid and thiamine for Hx etoh use  - Last drawn K+ and Phos WNL (intermittently low)  - Hypernatremia; ordered for free water    EDEMA:  [x] Reviewed  [x] Relevant notes: 2+ dependent 3+ left hand; right hand    GI/ I&O:  [x] Reviewed  [] Relevant notes:  [x] Other: Liquid green stool    SKIN:   [x] No pressure injuries documented, per nursing flowsheet  [] Pressure injury previously noted  [] Change in pressure injury documentation:  [] Other:    ESTIMATED NEEDS:  Based on dosing wt 83 kg  Energy: (23-28 kcals/kg) 9407-4995 kcal/day   Protein: (1.2-1.4 g/kg) 100-116 g/day  Fluid needs deferred to provider    NUTRITION DIAGNOSIS:  [x] Prior Dx: Increased protein-energy needs   [x] New Dx: Inadequate protein-energy intake related to procedures/complex medical course as evidenced by enteral nutrition not yet at goal, previously NPO    EDUCATION:  [] Yes:  [x] Not appropriate/warranted    NUTRITION CARE PLAN:  1. Diet: Glucerna 1.5 at 30 mL/hr increasing only as tolerated and electrolytes WNL to goal rate 55 mL/hr x24 hours to provide total volume 1320 mL, 1980 kcals, 109 gm protein, and 1002 mL free water. Meets 24 kcals/kG and 1.3 gm protein/kG based on dosing wt 83 kG. 24 hour feeds for refeeding risk.   2. Multivitamin/mineral supplementation: As medically feasible, continue to provide thiamine and folic acid + add multivitamin as able.     [] Achieved - Continue current nutrition intervention(s)  [] Current medical condition precludes nutrition intervention at this time.    MONITORING AND EVALUATION:   RD remains available upon request and will follow up per protocol.    Sanjana Davalos, MS, RD, CDN, CNSC, CDCES TEAMS

## 2024-09-21 DIAGNOSIS — I95.9 HYPOTENSION, UNSPECIFIED: ICD-10-CM

## 2024-09-21 DIAGNOSIS — K70.0 ALCOHOLIC FATTY LIVER: ICD-10-CM

## 2024-09-21 DIAGNOSIS — E80.7 DISORDER OF BILIRUBIN METABOLISM, UNSPECIFIED: ICD-10-CM

## 2024-09-21 DIAGNOSIS — E72.20 DISORDER OF UREA CYCLE METABOLISM, UNSPECIFIED: ICD-10-CM

## 2024-09-21 DIAGNOSIS — D50.9 IRON DEFICIENCY ANEMIA, UNSPECIFIED: ICD-10-CM

## 2024-09-21 DIAGNOSIS — E78.5 HYPERLIPIDEMIA, UNSPECIFIED: ICD-10-CM

## 2024-09-21 DIAGNOSIS — R03.0 ELEVATED BLOOD-PRESSURE READING, WITHOUT DIAGNOSIS OF HYPERTENSION: ICD-10-CM

## 2024-09-21 DIAGNOSIS — S36.118A OTHER INJURY OF LIVER, INITIAL ENCOUNTER: ICD-10-CM

## 2024-09-21 DIAGNOSIS — K86.0 ALCOHOL-INDUCED CHRONIC PANCREATITIS: ICD-10-CM

## 2024-09-21 DIAGNOSIS — K72.90 HEPATIC FAILURE, UNSPECIFIED WITHOUT COMA: ICD-10-CM

## 2024-09-21 DIAGNOSIS — K70.10 ALCOHOLIC HEPATITIS WITHOUT ASCITES: ICD-10-CM

## 2024-09-21 DIAGNOSIS — D69.6 THROMBOCYTOPENIA, UNSPECIFIED: ICD-10-CM

## 2024-09-21 DIAGNOSIS — K71.2 TOXIC LIVER DISEASE WITH ACUTE HEPATITIS: ICD-10-CM

## 2024-09-21 DIAGNOSIS — R11.2 NAUSEA WITH VOMITING, UNSPECIFIED: ICD-10-CM

## 2024-09-21 DIAGNOSIS — E87.5 HYPERKALEMIA: ICD-10-CM

## 2024-09-21 DIAGNOSIS — K76.0 FATTY (CHANGE OF) LIVER, NOT ELSEWHERE CLASSIFIED: ICD-10-CM

## 2024-09-21 DIAGNOSIS — K29.20 ALCOHOLIC GASTRITIS WITHOUT BLEEDING: ICD-10-CM

## 2024-09-21 DIAGNOSIS — R00.0 TACHYCARDIA, UNSPECIFIED: ICD-10-CM

## 2024-09-21 DIAGNOSIS — E83.39 OTHER DISORDERS OF PHOSPHORUS METABOLISM: ICD-10-CM

## 2024-09-21 DIAGNOSIS — R74.01 ELEVATION OF LEVELS OF LIVER TRANSAMINASE LEVELS: ICD-10-CM

## 2024-09-21 DIAGNOSIS — E87.20 ACIDOSIS, UNSPECIFIED: ICD-10-CM

## 2024-09-21 DIAGNOSIS — E16.2 HYPOGLYCEMIA, UNSPECIFIED: ICD-10-CM

## 2024-09-21 DIAGNOSIS — T39.1X1A POISONING BY 4-AMINOPHENOL DERIVATIVES, ACCIDENTAL (UNINTENTIONAL), INITIAL ENCOUNTER: ICD-10-CM

## 2024-09-21 DIAGNOSIS — R45.1 RESTLESSNESS AND AGITATION: ICD-10-CM

## 2024-09-21 DIAGNOSIS — E83.42 HYPOMAGNESEMIA: ICD-10-CM

## 2024-09-21 DIAGNOSIS — G93.41 METABOLIC ENCEPHALOPATHY: ICD-10-CM

## 2024-09-21 DIAGNOSIS — N17.9 ACUTE KIDNEY FAILURE, UNSPECIFIED: ICD-10-CM

## 2024-09-21 DIAGNOSIS — R25.3 FASCICULATION: ICD-10-CM

## 2024-09-21 DIAGNOSIS — F10.231 ALCOHOL DEPENDENCE WITH WITHDRAWAL DELIRIUM: ICD-10-CM
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
## 2024-09-21 DIAGNOSIS — R10.9 UNSPECIFIED ABDOMINAL PAIN: ICD-10-CM

## 2024-09-21 LAB
ALBUMIN SERPL ELPH-MCNC: 2.7 G/DL — LOW (ref 3.3–5)
ALBUMIN SERPL ELPH-MCNC: 2.8 G/DL — LOW (ref 3.3–5)
ALBUMIN SERPL ELPH-MCNC: 3 G/DL — LOW (ref 3.3–5)
ALP SERPL-CCNC: 109 U/L — SIGNIFICANT CHANGE UP (ref 40–120)
ALP SERPL-CCNC: 93 U/L — SIGNIFICANT CHANGE UP (ref 40–120)
ALP SERPL-CCNC: 96 U/L — SIGNIFICANT CHANGE UP (ref 40–120)
ALT FLD-CCNC: 120 U/L — HIGH (ref 10–45)
ALT FLD-CCNC: 127 U/L — HIGH (ref 10–45)
ALT FLD-CCNC: 131 U/L — HIGH (ref 10–45)
AMMONIA BLD-MCNC: 52 UMOL/L — SIGNIFICANT CHANGE UP (ref 11–55)
AMMONIA BLD-MCNC: 57 UMOL/L — HIGH (ref 11–55)
AMMONIA BLD-MCNC: 62 UMOL/L — HIGH (ref 11–55)
ANION GAP SERPL CALC-SCNC: 11 MMOL/L — SIGNIFICANT CHANGE UP (ref 5–17)
ANION GAP SERPL CALC-SCNC: 9 MMOL/L — SIGNIFICANT CHANGE UP (ref 5–17)
ANION GAP SERPL CALC-SCNC: 9 MMOL/L — SIGNIFICANT CHANGE UP (ref 5–17)
APTT BLD: 28.6 SEC — SIGNIFICANT CHANGE UP (ref 24.5–35.6)
APTT BLD: 29.4 SEC — SIGNIFICANT CHANGE UP (ref 24.5–35.6)
APTT BLD: 29.8 SEC — SIGNIFICANT CHANGE UP (ref 24.5–35.6)
AST SERPL-CCNC: 61 U/L — HIGH (ref 10–40)
AST SERPL-CCNC: 65 U/L — HIGH (ref 10–40)
AST SERPL-CCNC: 69 U/L — HIGH (ref 10–40)
B BURGDOR DNA SPEC QL NAA+PROBE: NEGATIVE — SIGNIFICANT CHANGE UP
BASOPHILS # BLD AUTO: 0.02 K/UL — SIGNIFICANT CHANGE UP (ref 0–0.2)
BASOPHILS # BLD AUTO: 0.02 K/UL — SIGNIFICANT CHANGE UP (ref 0–0.2)
BASOPHILS # BLD AUTO: 0.03 K/UL — SIGNIFICANT CHANGE UP (ref 0–0.2)
BASOPHILS NFR BLD AUTO: 0.2 % — SIGNIFICANT CHANGE UP (ref 0–2)
BASOPHILS NFR BLD AUTO: 0.3 % — SIGNIFICANT CHANGE UP (ref 0–2)
BASOPHILS NFR BLD AUTO: 0.4 % — SIGNIFICANT CHANGE UP (ref 0–2)
BILIRUB SERPL-MCNC: 6.4 MG/DL — HIGH (ref 0.2–1.2)
BILIRUB SERPL-MCNC: 6.8 MG/DL — HIGH (ref 0.2–1.2)
BILIRUB SERPL-MCNC: 7.1 MG/DL — HIGH (ref 0.2–1.2)
BUN SERPL-MCNC: 29 MG/DL — HIGH (ref 7–23)
BUN SERPL-MCNC: 29 MG/DL — HIGH (ref 7–23)
BUN SERPL-MCNC: 30 MG/DL — HIGH (ref 7–23)
CALCIUM SERPL-MCNC: 8.6 MG/DL — SIGNIFICANT CHANGE UP (ref 8.4–10.5)
CALCIUM SERPL-MCNC: 8.7 MG/DL — SIGNIFICANT CHANGE UP (ref 8.4–10.5)
CALCIUM SERPL-MCNC: 8.9 MG/DL — SIGNIFICANT CHANGE UP (ref 8.4–10.5)
CHLORIDE SERPL-SCNC: 122 MMOL/L — HIGH (ref 96–108)
CHLORIDE SERPL-SCNC: 123 MMOL/L — HIGH (ref 96–108)
CHLORIDE SERPL-SCNC: 125 MMOL/L — HIGH (ref 96–108)
CO2 SERPL-SCNC: 22 MMOL/L — SIGNIFICANT CHANGE UP (ref 22–31)
CO2 SERPL-SCNC: 23 MMOL/L — SIGNIFICANT CHANGE UP (ref 22–31)
CO2 SERPL-SCNC: 25 MMOL/L — SIGNIFICANT CHANGE UP (ref 22–31)
CREAT SERPL-MCNC: 1.05 MG/DL — SIGNIFICANT CHANGE UP (ref 0.5–1.3)
CREAT SERPL-MCNC: 1.1 MG/DL — SIGNIFICANT CHANGE UP (ref 0.5–1.3)
CREAT SERPL-MCNC: 1.17 MG/DL — SIGNIFICANT CHANGE UP (ref 0.5–1.3)
CULTURE RESULTS: ABNORMAL
D DIMER BLD IA.RAPID-MCNC: 2046 NG/ML DDU — HIGH
EGFR: 73 ML/MIN/1.73M2 — SIGNIFICANT CHANGE UP
EGFR: 78 ML/MIN/1.73M2 — SIGNIFICANT CHANGE UP
EGFR: 83 ML/MIN/1.73M2 — SIGNIFICANT CHANGE UP
EOSINOPHIL # BLD AUTO: 0.44 K/UL — SIGNIFICANT CHANGE UP (ref 0–0.5)
EOSINOPHIL # BLD AUTO: 0.48 K/UL — SIGNIFICANT CHANGE UP (ref 0–0.5)
EOSINOPHIL # BLD AUTO: 0.52 K/UL — HIGH (ref 0–0.5)
EOSINOPHIL NFR BLD AUTO: 5.2 % — SIGNIFICANT CHANGE UP (ref 0–6)
EOSINOPHIL NFR BLD AUTO: 5.5 % — SIGNIFICANT CHANGE UP (ref 0–6)
EOSINOPHIL NFR BLD AUTO: 6.5 % — HIGH (ref 0–6)
FIBRINOGEN PPP-MCNC: 195 MG/DL — LOW (ref 200–445)
FIBRINOGEN PPP-MCNC: 204 MG/DL — SIGNIFICANT CHANGE UP (ref 200–445)
FIBRINOGEN PPP-MCNC: 207 MG/DL — SIGNIFICANT CHANGE UP (ref 200–445)
GAS PNL BLDA: SIGNIFICANT CHANGE UP
GLUCOSE SERPL-MCNC: 129 MG/DL — HIGH (ref 70–99)
GLUCOSE SERPL-MCNC: 139 MG/DL — HIGH (ref 70–99)
GLUCOSE SERPL-MCNC: 153 MG/DL — HIGH (ref 70–99)
HCT VFR BLD CALC: 25.3 % — LOW (ref 39–50)
HCT VFR BLD CALC: 26 % — LOW (ref 39–50)
HCT VFR BLD CALC: 26.1 % — LOW (ref 39–50)
HGB BLD-MCNC: 8 G/DL — LOW (ref 13–17)
HGB BLD-MCNC: 8 G/DL — LOW (ref 13–17)
HGB BLD-MCNC: 8.1 G/DL — LOW (ref 13–17)
IMM GRANULOCYTES NFR BLD AUTO: 1.1 % — HIGH (ref 0–0.9)
INR BLD: 1.81 RATIO — HIGH (ref 0.85–1.18)
INR BLD: 1.89 RATIO — HIGH (ref 0.85–1.18)
INR BLD: 1.95 RATIO — HIGH (ref 0.85–1.18)
LDH SERPL L TO P-CCNC: 253 U/L — HIGH (ref 50–242)
LYMPHOCYTES # BLD AUTO: 1.52 K/UL — SIGNIFICANT CHANGE UP (ref 1–3.3)
LYMPHOCYTES # BLD AUTO: 1.71 K/UL — SIGNIFICANT CHANGE UP (ref 1–3.3)
LYMPHOCYTES # BLD AUTO: 1.9 K/UL — SIGNIFICANT CHANGE UP (ref 1–3.3)
LYMPHOCYTES # BLD AUTO: 17.8 % — SIGNIFICANT CHANGE UP (ref 13–44)
LYMPHOCYTES # BLD AUTO: 20.2 % — SIGNIFICANT CHANGE UP (ref 13–44)
LYMPHOCYTES # BLD AUTO: 23.3 % — SIGNIFICANT CHANGE UP (ref 13–44)
MAGNESIUM SERPL-MCNC: 2.5 MG/DL — SIGNIFICANT CHANGE UP (ref 1.6–2.6)
MAGNESIUM SERPL-MCNC: 2.6 MG/DL — SIGNIFICANT CHANGE UP (ref 1.6–2.6)
MAGNESIUM SERPL-MCNC: 2.7 MG/DL — HIGH (ref 1.6–2.6)
MCHC RBC-ENTMCNC: 24.5 PG — LOW (ref 27–34)
MCHC RBC-ENTMCNC: 30.8 GM/DL — LOW (ref 32–36)
MCHC RBC-ENTMCNC: 31 GM/DL — LOW (ref 32–36)
MCHC RBC-ENTMCNC: 31.6 GM/DL — LOW (ref 32–36)
MCV RBC AUTO: 77.6 FL — LOW (ref 80–100)
MCV RBC AUTO: 78.9 FL — LOW (ref 80–100)
MCV RBC AUTO: 79.5 FL — LOW (ref 80–100)
MONOCYTES # BLD AUTO: 0.77 K/UL — SIGNIFICANT CHANGE UP (ref 0–0.9)
MONOCYTES # BLD AUTO: 0.95 K/UL — HIGH (ref 0–0.9)
MONOCYTES # BLD AUTO: 1.13 K/UL — HIGH (ref 0–0.9)
MONOCYTES NFR BLD AUTO: 10.5 % — SIGNIFICANT CHANGE UP (ref 2–14)
MONOCYTES NFR BLD AUTO: 11.1 % — SIGNIFICANT CHANGE UP (ref 2–14)
MONOCYTES NFR BLD AUTO: 12 % — SIGNIFICANT CHANGE UP (ref 2–14)
NEUTROPHILS # BLD AUTO: 4.29 K/UL — SIGNIFICANT CHANGE UP (ref 1.8–7.4)
NEUTROPHILS # BLD AUTO: 5.51 K/UL — SIGNIFICANT CHANGE UP (ref 1.8–7.4)
NEUTROPHILS # BLD AUTO: 5.75 K/UL — SIGNIFICANT CHANGE UP (ref 1.8–7.4)
NEUTROPHILS NFR BLD AUTO: 58.3 % — SIGNIFICANT CHANGE UP (ref 43–77)
NEUTROPHILS NFR BLD AUTO: 61 % — SIGNIFICANT CHANGE UP (ref 43–77)
NEUTROPHILS NFR BLD AUTO: 64.4 % — SIGNIFICANT CHANGE UP (ref 43–77)
NRBC # BLD: 0 /100 WBCS — SIGNIFICANT CHANGE UP (ref 0–0)
PHOSPHATE SERPL-MCNC: 2.4 MG/DL — LOW (ref 2.5–4.5)
PHOSPHATE SERPL-MCNC: 2.6 MG/DL — SIGNIFICANT CHANGE UP (ref 2.5–4.5)
PHOSPHATE SERPL-MCNC: 2.7 MG/DL — SIGNIFICANT CHANGE UP (ref 2.5–4.5)
PLATELET # BLD AUTO: 108 K/UL — LOW (ref 150–400)
PLATELET # BLD AUTO: 94 K/UL — LOW (ref 150–400)
PLATELET # BLD AUTO: 94 K/UL — LOW (ref 150–400)
POTASSIUM SERPL-MCNC: 3.6 MMOL/L — SIGNIFICANT CHANGE UP (ref 3.5–5.3)
POTASSIUM SERPL-MCNC: 3.8 MMOL/L — SIGNIFICANT CHANGE UP (ref 3.5–5.3)
POTASSIUM SERPL-MCNC: 3.9 MMOL/L — SIGNIFICANT CHANGE UP (ref 3.5–5.3)
POTASSIUM SERPL-SCNC: 3.6 MMOL/L — SIGNIFICANT CHANGE UP (ref 3.5–5.3)
POTASSIUM SERPL-SCNC: 3.8 MMOL/L — SIGNIFICANT CHANGE UP (ref 3.5–5.3)
POTASSIUM SERPL-SCNC: 3.9 MMOL/L — SIGNIFICANT CHANGE UP (ref 3.5–5.3)
PROT SERPL-MCNC: 5.3 G/DL — LOW (ref 6–8.3)
PROT SERPL-MCNC: 5.3 G/DL — LOW (ref 6–8.3)
PROT SERPL-MCNC: 5.5 G/DL — LOW (ref 6–8.3)
PROTHROM AB SERPL-ACNC: 19.6 SEC — HIGH (ref 9.5–13)
PROTHROM AB SERPL-ACNC: 20.1 SEC — HIGH (ref 9.5–13)
PROTHROM AB SERPL-ACNC: 20.4 SEC — HIGH (ref 9.5–13)
RBC # BLD: 3.26 M/UL — LOW (ref 4.2–5.8)
RBC # BLD: 3.27 M/UL — LOW (ref 4.2–5.8)
RBC # BLD: 3.31 M/UL — LOW (ref 4.2–5.8)
RBC # FLD: 23.5 % — HIGH (ref 10.3–14.5)
RBC # FLD: 24.1 % — HIGH (ref 10.3–14.5)
RBC # FLD: 24.2 % — HIGH (ref 10.3–14.5)
SODIUM SERPL-SCNC: 156 MMOL/L — HIGH (ref 135–145)
SODIUM SERPL-SCNC: 156 MMOL/L — HIGH (ref 135–145)
SODIUM SERPL-SCNC: 157 MMOL/L — HIGH (ref 135–145)
SPECIMEN SOURCE: SIGNIFICANT CHANGE UP
VANCOMYCIN TROUGH SERPL-MCNC: 11.6 UG/ML — SIGNIFICANT CHANGE UP (ref 10–20)
WBC # BLD: 7.35 K/UL — SIGNIFICANT CHANGE UP (ref 3.8–10.5)
WBC # BLD: 8.54 K/UL — SIGNIFICANT CHANGE UP (ref 3.8–10.5)
WBC # BLD: 9.42 K/UL — SIGNIFICANT CHANGE UP (ref 3.8–10.5)
WBC # FLD AUTO: 7.35 K/UL — SIGNIFICANT CHANGE UP (ref 3.8–10.5)
WBC # FLD AUTO: 8.54 K/UL — SIGNIFICANT CHANGE UP (ref 3.8–10.5)
WBC # FLD AUTO: 9.42 K/UL — SIGNIFICANT CHANGE UP (ref 3.8–10.5)

## 2024-09-21 PROCEDURE — 99232 SBSQ HOSP IP/OBS MODERATE 35: CPT

## 2024-09-21 PROCEDURE — 99291 CRITICAL CARE FIRST HOUR: CPT | Mod: GC

## 2024-09-21 RX ORDER — VANCOMYCIN HCL-SODIUM CHLORIDE IV SOLN 1.5 GM/250ML-0.9% 1.5-0.9/25 GM/ML-%
1250 SOLUTION INTRAVENOUS EVERY 12 HOURS
Refills: 0 | Status: DISCONTINUED | OUTPATIENT
Start: 2024-09-21 | End: 2024-09-22

## 2024-09-21 RX ORDER — PIPERACILLIN SODIUM AND TAZOBACTAM SODIUM 12; 1.5 G/60ML; G/60ML
3.38 INJECTION, POWDER, LYOPHILIZED, FOR SOLUTION INTRAVENOUS ONCE
Refills: 0 | Status: COMPLETED | OUTPATIENT
Start: 2024-09-21 | End: 2024-09-21

## 2024-09-21 RX ORDER — ACETYLCYSTEINE
12 POWDER (GRAM) MISCELLANEOUS EVERY 24 HOURS
Refills: 0 | Status: DISCONTINUED | OUTPATIENT
Start: 2024-09-21 | End: 2024-09-23

## 2024-09-21 RX ORDER — PIPERACILLIN SODIUM AND TAZOBACTAM SODIUM 12; 1.5 G/60ML; G/60ML
3.38 INJECTION, POWDER, LYOPHILIZED, FOR SOLUTION INTRAVENOUS EVERY 8 HOURS
Refills: 0 | Status: COMPLETED | OUTPATIENT
Start: 2024-09-21 | End: 2024-09-27

## 2024-09-21 RX ORDER — POTASSIUM PHOSPHATE, MONOBASIC POTASSIUM PHOSPHATE, DIBASIC 224; 236 MG/ML; MG/ML
15 INJECTION, SOLUTION, CONCENTRATE INTRAVENOUS ONCE
Refills: 0 | Status: COMPLETED | OUTPATIENT
Start: 2024-09-21 | End: 2024-09-21

## 2024-09-21 RX ADMIN — THIAMINE HYDROCHLORIDE 100 MILLIGRAM(S): 100 INJECTION, SOLUTION INTRAMUSCULAR; INTRAVENOUS at 12:24

## 2024-09-21 RX ADMIN — LABETALOL HYDROCHLORIDE 200 MILLIGRAM(S): 200 TABLET, FILM COATED ORAL at 13:55

## 2024-09-21 RX ADMIN — LABETALOL HYDROCHLORIDE 200 MILLIGRAM(S): 200 TABLET, FILM COATED ORAL at 21:18

## 2024-09-21 RX ADMIN — CHLORHEXIDINE GLUCONATE ORAL RINSE 1 APPLICATION(S): 1.2 SOLUTION DENTAL at 05:19

## 2024-09-21 RX ADMIN — Medication 17 GRAM(S): at 18:08

## 2024-09-21 RX ADMIN — CHLORHEXIDINE GLUCONATE ORAL RINSE 15 MILLILITER(S): 1.2 SOLUTION DENTAL at 18:08

## 2024-09-21 RX ADMIN — MUPIROCIN 1 APPLICATION(S): 20 OINTMENT TOPICAL at 05:19

## 2024-09-21 RX ADMIN — LACTULOSE 30 GRAM(S): 10 SOLUTION ORAL; RECTAL at 02:18

## 2024-09-21 RX ADMIN — LABETALOL HYDROCHLORIDE 200 MILLIGRAM(S): 200 TABLET, FILM COATED ORAL at 05:19

## 2024-09-21 RX ADMIN — PIPERACILLIN SODIUM AND TAZOBACTAM SODIUM 25 GRAM(S): 12; 1.5 INJECTION, POWDER, LYOPHILIZED, FOR SOLUTION INTRAVENOUS at 15:58

## 2024-09-21 RX ADMIN — Medication 5000 UNIT(S): at 21:18

## 2024-09-21 RX ADMIN — PANTOPRAZOLE SODIUM 40 MILLIGRAM(S): 40 TABLET, DELAYED RELEASE ORAL at 05:18

## 2024-09-21 RX ADMIN — PANTOPRAZOLE SODIUM 40 MILLIGRAM(S): 40 TABLET, DELAYED RELEASE ORAL at 18:08

## 2024-09-21 RX ADMIN — PIPERACILLIN SODIUM AND TAZOBACTAM SODIUM 200 GRAM(S): 12; 1.5 INJECTION, POWDER, LYOPHILIZED, FOR SOLUTION INTRAVENOUS at 10:18

## 2024-09-21 RX ADMIN — VANCOMYCIN HCL-SODIUM CHLORIDE IV SOLN 1.5 GM/250ML-0.9% 166.67 MILLIGRAM(S): 1.5-0.9/25 SOLUTION at 18:08

## 2024-09-21 RX ADMIN — LACTULOSE 30 GRAM(S): 10 SOLUTION ORAL; RECTAL at 21:17

## 2024-09-21 RX ADMIN — FOLIC ACID 1 MILLIGRAM(S): 1 TABLET ORAL at 15:59

## 2024-09-21 RX ADMIN — LACTULOSE 30 GRAM(S): 10 SOLUTION ORAL; RECTAL at 05:19

## 2024-09-21 RX ADMIN — Medication 17 GRAM(S): at 05:18

## 2024-09-21 RX ADMIN — CHLORHEXIDINE GLUCONATE ORAL RINSE 15 MILLILITER(S): 1.2 SOLUTION DENTAL at 05:18

## 2024-09-21 RX ADMIN — Medication 5000 UNIT(S): at 05:19

## 2024-09-21 RX ADMIN — VANCOMYCIN HCL-SODIUM CHLORIDE IV SOLN 1.5 GM/250ML-0.9% 166.67 MILLIGRAM(S): 1.5-0.9/25 SOLUTION at 05:18

## 2024-09-21 RX ADMIN — LACTULOSE 30 GRAM(S): 10 SOLUTION ORAL; RECTAL at 18:07

## 2024-09-21 RX ADMIN — Medication 50 MILLILITER(S): at 02:18

## 2024-09-21 RX ADMIN — LACTULOSE 30 GRAM(S): 10 SOLUTION ORAL; RECTAL at 10:11

## 2024-09-21 RX ADMIN — Medication 5000 UNIT(S): at 13:55

## 2024-09-21 RX ADMIN — POTASSIUM PHOSPHATE, MONOBASIC POTASSIUM PHOSPHATE, DIBASIC 62.5 MILLIMOLE(S): 224; 236 INJECTION, SOLUTION, CONCENTRATE INTRAVENOUS at 08:44

## 2024-09-21 RX ADMIN — LACTULOSE 30 GRAM(S): 10 SOLUTION ORAL; RECTAL at 13:55

## 2024-09-21 RX ADMIN — Medication 44.17 GRAM(S): at 18:07

## 2024-09-21 RX ADMIN — PIPERACILLIN SODIUM AND TAZOBACTAM SODIUM 25 GRAM(S): 12; 1.5 INJECTION, POWDER, LYOPHILIZED, FOR SOLUTION INTRAVENOUS at 21:18

## 2024-09-21 RX ADMIN — MUPIROCIN 1 APPLICATION(S): 20 OINTMENT TOPICAL at 18:09

## 2024-09-21 NOTE — PROGRESS NOTE ADULT - ATTENDING COMMENTS
The patient is a 59-year-old male with a history of chronic alcohol abuse and dependence (1 bottle of vodka daily), alcohol-related gastritis/esophagitis, peptic ulcer disease, hyperlipidemia, and multiple ICU admissions for alcohol withdrawal and delirium tremens, now presenting with acute liver failure (GUNJAN). His MELD score has fluctuated from 34 on 9/16 to 23 as of 9/21. The patient presented with altered mental status, was intubated, and has findings concerning for cerebral edema. His alcohol level was <10, and Tylenol level was 42 on admission, raising concerns for potential Tylenol-induced hepatotoxicity or ischemic liver injury due to prior hypotension.  The etiology of his correction could be multifactorial, including ischemia, Tylenol use, or sepsis. There is also concern for acute cholecystitis based on imaging findings of a distended gallbladder. His serologic workup for other viral causes of liver dysfunction has been negative.  Given the patient's extensive history of alcohol use, frequent hospitalizations, and lack of sustained recovery, he is unlikely to be a candidate for liver transplantation.  Recommendations include continuing to monitor mental status off sedation, aggressive electrolyte repletion, maintaining a MAP >65, and ongoing NAC infusion and lactulose enemas. He is on enteral feeding and has completed three sessions of plasma exchange (PLEX). Keep Labs monitored every 6 hours, including ammonia, CMP, phosphorus, and INR.

## 2024-09-21 NOTE — PROGRESS NOTE ADULT - SUBJECTIVE AND OBJECTIVE BOX
CHIEF COMPLAINT:    Interval Events:    REVIEW OF SYSTEMS:      OBJECTIVE:  ICU Vital Signs Last 24 Hrs  T(C): 37.5 (21 Sep 2024 07:00), Max: 38.1 (20 Sep 2024 18:00)  T(F): 99.5 (21 Sep 2024 07:00), Max: 100.6 (20 Sep 2024 18:00)  HR: 100 (21 Sep 2024 07:00) (96 - 110)  BP: --  BP(mean): --  ABP: 137/76 (21 Sep 2024 07:00) (121/68 - 174/93)  ABP(mean): 102 (21 Sep 2024 07:00) (91 - 129)  RR: 18 (21 Sep 2024 07:00) (11 - 20)  SpO2: 100% (21 Sep 2024 07:00) (99% - 100%)    O2 Parameters below as of 21 Sep 2024 03:10  Patient On (Oxygen Delivery Method): ventilator    O2 Concentration (%): 21      Mode: CPAP with PS, FiO2: 21, PEEP: 5, PS: 5, MAP: 7, PIP: 11    09-20 @ 07:01  -  09-21 @ 07:00  --------------------------------------------------------  IN: 5530.4 mL / OUT: 4350 mL / NET: 1180.4 mL      CAPILLARY BLOOD GLUCOSE    PHYSICAL EXAM:     GENERAL: NAD, lying in bed comfortably.  HEAD:  Atraumatic, normocephalic.  EYES: EOMI, conjunctiva and sclera clear.  ENT: Moist mucous membranes.  NECK: Supple, trachea midline.  CHEST/LUNG: CTAB. No rales, rhonchi, wheezing, or rubs. Unlabored respirations.  HEART: RRR, no M/R/G, S1/S2  ABDOMEN: Soft, nontender, nondistended, no organomegaly. Normoactive bowel sounds.  EXTREMITIES:  2+ peripheral pulses b/l, brisk capillary refill. No clubbing, cyanosis, or edema.  Neurological:  AAOx3, no focal deficits.   SKIN: No rashes or lesions.  PSYCH: Normal affect and mood.   Lines:     LINES:    HOSPITAL MEDICATIONS:  Standing Meds:  acetylcysteine IVPB 12 Gram(s) IV Intermittent every 24 hours  cefTRIAXone   IVPB 1000 milliGRAM(s) IV Intermittent every 24 hours  chlorhexidine 0.12% Liquid 15 milliLiter(s) Oral Mucosa every 12 hours  chlorhexidine 4% Liquid 1 Application(s) Topical <User Schedule>  folic acid Injectable 1 milliGRAM(s) IV Push daily  heparin   Injectable 5000 Unit(s) SubCutaneous every 8 hours  labetalol 200 milliGRAM(s) Oral three times a day  lactated ringers. 1000 milliLiter(s) IV Continuous <Continuous>  lactulose Syrup 30 Gram(s) Oral every 4 hours  mupirocin 2% Nasal 1 Application(s) Both Nostrils two times a day  pantoprazole  Injectable 40 milliGRAM(s) IV Push two times a day  polyethylene glycol 3350 17 Gram(s) Oral two times a day  potassium phosphate IVPB 15 milliMole(s) IV Intermittent once  rifAXIMin 550 milliGRAM(s) Oral two times a day  thiamine 100 milliGRAM(s) Oral daily  vancomycin  IVPB 1250 milliGRAM(s) IV Intermittent every 12 hours      PRN Meds:  fentaNYL    Injectable 50 MICROGram(s) IV Push every 4 hours PRN      LABS:                        8.1    7.35  )-----------( 94       ( 21 Sep 2024 06:34 )             26.1     Hgb Trend: 8.1<--, 8.4<--, 7.9<--, 8.9<--, 8.6<--  09-21    156[H]  |  122[H]  |  30[H]  ----------------------------<  153[H]  3.8   |  25  |  1.05    Ca    8.9      21 Sep 2024 06:34  Phos  2.4     09-21  Mg     2.5     09-21    TPro  5.3[L]  /  Alb  3.0[L]  /  TBili  6.4[H]  /  DBili  x   /  AST  61[H]  /  ALT  120[H]  /  AlkPhos  93  09-21    Creatinine Trend: 1.05<--, 1.09<--, 1.12<--, 1.08<--, 1.09<--, 1.09<--  PT/INR - ( 21 Sep 2024 06:34 )   PT: 20.1 sec;   INR: 1.95 ratio         PTT - ( 21 Sep 2024 06:34 )  PTT:28.6 sec  Urinalysis Basic - ( 21 Sep 2024 06:34 )    Color: x / Appearance: x / SG: x / pH: x  Gluc: 153 mg/dL / Ketone: x  / Bili: x / Urobili: x   Blood: x / Protein: x / Nitrite: x   Leuk Esterase: x / RBC: x / WBC x   Sq Epi: x / Non Sq Epi: x / Bacteria: x      Arterial Blood Gas:  09-21 @ 00:24  7.49/34/131/26/99.9/2.6  ABG lactate: --  Arterial Blood Gas:  09-20 @ 00:29  7.52/32/173/26/99.9/3.3  ABG lactate: --        MICROBIOLOGY:     Culture - Sputum (collected 20 Sep 2024 00:32)  Source: .Sputum Sputum  Gram Stain (20 Sep 2024 14:38):    Moderate polymorphonuclear leukocytes per low power field    Rare Squamous epithelial cells per low power field    Numerous Gram Positive Cocci in Clusters seen per oil power field  Preliminary Report (21 Sep 2024 07:28):    Normal Respiratory Melanie present    Culture - Blood (collected 19 Sep 2024 17:52)  Source: .Blood Blood-Venous  Preliminary Report (20 Sep 2024 22:01):    No growth at 24 hours    Culture - Blood (collected 19 Sep 2024 17:50)  Source: .Blood Blood-Peripheral  Preliminary Report (20 Sep 2024 22:01):    No growth at 24 hours      RADIOLOGY:  [ ] Reviewed and interpreted by me    EKG:   CHIEF COMPLAINT:    Interval Events: no acute events overnight  - Labetalol changed to 200mg TID    REVIEW OF SYSTEMS:      OBJECTIVE:  ICU Vital Signs Last 24 Hrs  T(C): 37.5 (21 Sep 2024 07:00), Max: 38.1 (20 Sep 2024 18:00)  T(F): 99.5 (21 Sep 2024 07:00), Max: 100.6 (20 Sep 2024 18:00)  HR: 100 (21 Sep 2024 07:00) (96 - 110)  BP: --  BP(mean): --  ABP: 137/76 (21 Sep 2024 07:00) (121/68 - 174/93)  ABP(mean): 102 (21 Sep 2024 07:00) (91 - 129)  RR: 18 (21 Sep 2024 07:00) (11 - 20)  SpO2: 100% (21 Sep 2024 07:00) (99% - 100%)    O2 Parameters below as of 21 Sep 2024 03:10  Patient On (Oxygen Delivery Method): ventilator    O2 Concentration (%): 21      Mode: CPAP with PS, FiO2: 21, PEEP: 5, PS: 5, MAP: 7, PIP: 11    09-20 @ 07:01  -  09-21 @ 07:00  --------------------------------------------------------  IN: 5530.4 mL / OUT: 4350 mL / NET: 1180.4 mL      CAPILLARY BLOOD GLUCOSE    PHYSICAL EXAM:     GENERAL: NAD, lying in bed comfortably.  HEAD:  Atraumatic, normocephalic.  EYES: EOMI, conjunctiva and sclera clear.  ENT: Moist mucous membranes.  NECK: Supple, trachea midline.  CHEST/LUNG: CTAB. No rales, rhonchi, wheezing, or rubs. Unlabored respirations.  HEART: RRR, no M/R/G, S1/S2  ABDOMEN: Soft, nontender, nondistended, no organomegaly. Normoactive bowel sounds.  EXTREMITIES:  2+ peripheral pulses b/l, brisk capillary refill. No clubbing, cyanosis, or edema.  Neurological:  AAOx3, no focal deficits.   SKIN: No rashes or lesions.  PSYCH: Normal affect and mood.   Lines:     LINES:    HOSPITAL MEDICATIONS:  Standing Meds:  acetylcysteine IVPB 12 Gram(s) IV Intermittent every 24 hours  cefTRIAXone   IVPB 1000 milliGRAM(s) IV Intermittent every 24 hours  chlorhexidine 0.12% Liquid 15 milliLiter(s) Oral Mucosa every 12 hours  chlorhexidine 4% Liquid 1 Application(s) Topical <User Schedule>  folic acid Injectable 1 milliGRAM(s) IV Push daily  heparin   Injectable 5000 Unit(s) SubCutaneous every 8 hours  labetalol 200 milliGRAM(s) Oral three times a day  lactated ringers. 1000 milliLiter(s) IV Continuous <Continuous>  lactulose Syrup 30 Gram(s) Oral every 4 hours  mupirocin 2% Nasal 1 Application(s) Both Nostrils two times a day  pantoprazole  Injectable 40 milliGRAM(s) IV Push two times a day  polyethylene glycol 3350 17 Gram(s) Oral two times a day  potassium phosphate IVPB 15 milliMole(s) IV Intermittent once  rifAXIMin 550 milliGRAM(s) Oral two times a day  thiamine 100 milliGRAM(s) Oral daily  vancomycin  IVPB 1250 milliGRAM(s) IV Intermittent every 12 hours      PRN Meds:  fentaNYL    Injectable 50 MICROGram(s) IV Push every 4 hours PRN      LABS:                        8.1    7.35  )-----------( 94       ( 21 Sep 2024 06:34 )             26.1     Hgb Trend: 8.1<--, 8.4<--, 7.9<--, 8.9<--, 8.6<--  09-21    156[H]  |  122[H]  |  30[H]  ----------------------------<  153[H]  3.8   |  25  |  1.05    Ca    8.9      21 Sep 2024 06:34  Phos  2.4     09-21  Mg     2.5     09-21    TPro  5.3[L]  /  Alb  3.0[L]  /  TBili  6.4[H]  /  DBili  x   /  AST  61[H]  /  ALT  120[H]  /  AlkPhos  93  09-21    Creatinine Trend: 1.05<--, 1.09<--, 1.12<--, 1.08<--, 1.09<--, 1.09<--  PT/INR - ( 21 Sep 2024 06:34 )   PT: 20.1 sec;   INR: 1.95 ratio         PTT - ( 21 Sep 2024 06:34 )  PTT:28.6 sec  Urinalysis Basic - ( 21 Sep 2024 06:34 )    Color: x / Appearance: x / SG: x / pH: x  Gluc: 153 mg/dL / Ketone: x  / Bili: x / Urobili: x   Blood: x / Protein: x / Nitrite: x   Leuk Esterase: x / RBC: x / WBC x   Sq Epi: x / Non Sq Epi: x / Bacteria: x      Arterial Blood Gas:  09-21 @ 00:24  7.49/34/131/26/99.9/2.6  ABG lactate: --  Arterial Blood Gas:  09-20 @ 00:29  7.52/32/173/26/99.9/3.3  ABG lactate: --        MICROBIOLOGY:     Culture - Sputum (collected 20 Sep 2024 00:32)  Source: .Sputum Sputum  Gram Stain (20 Sep 2024 14:38):    Moderate polymorphonuclear leukocytes per low power field    Rare Squamous epithelial cells per low power field    Numerous Gram Positive Cocci in Clusters seen per oil power field  Preliminary Report (21 Sep 2024 07:28):    Normal Respiratory Melanie present    Culture - Blood (collected 19 Sep 2024 17:52)  Source: .Blood Blood-Venous  Preliminary Report (20 Sep 2024 22:01):    No growth at 24 hours    Culture - Blood (collected 19 Sep 2024 17:50)  Source: .Blood Blood-Peripheral  Preliminary Report (20 Sep 2024 22:01):    No growth at 24 hours      RADIOLOGY:  [ ] Reviewed and interpreted by me    EKG:   CHIEF COMPLAINT:    Interval Events: no acute events overnight  - Labetalol changed to 200mg TID for HTN    REVIEW OF SYSTEMS:  Unable to assess due to patient status    OBJECTIVE:  ICU Vital Signs Last 24 Hrs  T(C): 37.5 (21 Sep 2024 07:00), Max: 38.1 (20 Sep 2024 18:00)  T(F): 99.5 (21 Sep 2024 07:00), Max: 100.6 (20 Sep 2024 18:00)  HR: 100 (21 Sep 2024 07:00) (96 - 110)  BP: --  BP(mean): --  ABP: 137/76 (21 Sep 2024 07:00) (121/68 - 174/93)  ABP(mean): 102 (21 Sep 2024 07:00) (91 - 129)  RR: 18 (21 Sep 2024 07:00) (11 - 20)  SpO2: 100% (21 Sep 2024 07:00) (99% - 100%)    O2 Parameters below as of 21 Sep 2024 03:10  Patient On (Oxygen Delivery Method): ventilator    O2 Concentration (%): 21      Mode: CPAP with PS, FiO2: 21, PEEP: 5, PS: 5, MAP: 7, PIP: 11    09-20 @ 07:01  -  09-21 @ 07:00  --------------------------------------------------------  IN: 5530.4 mL / OUT: 4350 mL / NET: 1180.4 mL      CAPILLARY BLOOD GLUCOSE    PHYSICAL EXAM:     GENERAL: intubated and sedated.  HEAD:  Atraumatic, normocephalic.  EYES: EOMI, conjunctiva and sclera clear.  ENT: Moist mucous membranes.  NECK: Supple, trachea midline.  CHEST/LUNG: CTAB. No rales, rhonchi, wheezing, or rubs. Unlabored respirations.  HEART: RRR, no M/R/G, S1/S2  ABDOMEN: Soft, nontender, nondistended, no organomegaly. Normoactive bowel sounds.  EXTREMITIES:  2+ peripheral pulses b/l, brisk capillary refill. Anasarcic extremities  Neurological:  AAOx0, no focal deficits.   SKIN: No rashes or lesions.  PSYCH: Normal affect and mood.   Lines:     LINES:    HOSPITAL MEDICATIONS:  Standing Meds:  acetylcysteine IVPB 12 Gram(s) IV Intermittent every 24 hours  cefTRIAXone   IVPB 1000 milliGRAM(s) IV Intermittent every 24 hours  chlorhexidine 0.12% Liquid 15 milliLiter(s) Oral Mucosa every 12 hours  chlorhexidine 4% Liquid 1 Application(s) Topical <User Schedule>  folic acid Injectable 1 milliGRAM(s) IV Push daily  heparin   Injectable 5000 Unit(s) SubCutaneous every 8 hours  labetalol 200 milliGRAM(s) Oral three times a day  lactated ringers. 1000 milliLiter(s) IV Continuous <Continuous>  lactulose Syrup 30 Gram(s) Oral every 4 hours  mupirocin 2% Nasal 1 Application(s) Both Nostrils two times a day  pantoprazole  Injectable 40 milliGRAM(s) IV Push two times a day  polyethylene glycol 3350 17 Gram(s) Oral two times a day  potassium phosphate IVPB 15 milliMole(s) IV Intermittent once  rifAXIMin 550 milliGRAM(s) Oral two times a day  thiamine 100 milliGRAM(s) Oral daily  vancomycin  IVPB 1250 milliGRAM(s) IV Intermittent every 12 hours      PRN Meds:  fentaNYL    Injectable 50 MICROGram(s) IV Push every 4 hours PRN      LABS:                        8.1    7.35  )-----------( 94       ( 21 Sep 2024 06:34 )             26.1     Hgb Trend: 8.1<--, 8.4<--, 7.9<--, 8.9<--, 8.6<--  09-21    156[H]  |  122[H]  |  30[H]  ----------------------------<  153[H]  3.8   |  25  |  1.05    Ca    8.9      21 Sep 2024 06:34  Phos  2.4     09-21  Mg     2.5     09-21    TPro  5.3[L]  /  Alb  3.0[L]  /  TBili  6.4[H]  /  DBili  x   /  AST  61[H]  /  ALT  120[H]  /  AlkPhos  93  09-21    Creatinine Trend: 1.05<--, 1.09<--, 1.12<--, 1.08<--, 1.09<--, 1.09<--  PT/INR - ( 21 Sep 2024 06:34 )   PT: 20.1 sec;   INR: 1.95 ratio         PTT - ( 21 Sep 2024 06:34 )  PTT:28.6 sec  Urinalysis Basic - ( 21 Sep 2024 06:34 )    Color: x / Appearance: x / SG: x / pH: x  Gluc: 153 mg/dL / Ketone: x  / Bili: x / Urobili: x   Blood: x / Protein: x / Nitrite: x   Leuk Esterase: x / RBC: x / WBC x   Sq Epi: x / Non Sq Epi: x / Bacteria: x      Arterial Blood Gas:  09-21 @ 00:24  7.49/34/131/26/99.9/2.6  ABG lactate: --  Arterial Blood Gas:  09-20 @ 00:29  7.52/32/173/26/99.9/3.3  ABG lactate: --        MICROBIOLOGY:     Culture - Sputum (collected 20 Sep 2024 00:32)  Source: .Sputum Sputum  Gram Stain (20 Sep 2024 14:38):    Moderate polymorphonuclear leukocytes per low power field    Rare Squamous epithelial cells per low power field    Numerous Gram Positive Cocci in Clusters seen per oil power field  Preliminary Report (21 Sep 2024 07:28):    Normal Respiratory Melanie present    Culture - Blood (collected 19 Sep 2024 17:52)  Source: .Blood Blood-Venous  Preliminary Report (20 Sep 2024 22:01):    No growth at 24 hours    Culture - Blood (collected 19 Sep 2024 17:50)  Source: .Blood Blood-Peripheral  Preliminary Report (20 Sep 2024 22:01):    No growth at 24 hours      RADIOLOGY:  [ ] Reviewed and interpreted by me    EKG:

## 2024-09-21 NOTE — PROGRESS NOTE ADULT - SUBJECTIVE AND OBJECTIVE BOX
Interval Events:   -off sedation x48 hours, only opening eyes briefly  -+gag/cough reflex   -pt with incr secretions  -bowel regimen ongoing with good BM output    Hospital Medications:  acetylcysteine IVPB 12 Gram(s) IV Intermittent every 24 hours  chlorhexidine 0.12% Liquid 15 milliLiter(s) Oral Mucosa every 12 hours  chlorhexidine 4% Liquid 1 Application(s) Topical <User Schedule>  fentaNYL    Injectable 50 MICROGram(s) IV Push every 4 hours PRN  folic acid Injectable 1 milliGRAM(s) IV Push daily  heparin   Injectable 5000 Unit(s) SubCutaneous every 8 hours  labetalol 200 milliGRAM(s) Oral three times a day  lactated ringers. 1000 milliLiter(s) IV Continuous <Continuous>  lactulose Syrup 30 Gram(s) Oral every 4 hours  mupirocin 2% Nasal 1 Application(s) Both Nostrils two times a day  pantoprazole  Injectable 40 milliGRAM(s) IV Push two times a day  piperacillin/tazobactam IVPB.. 3.375 Gram(s) IV Intermittent every 8 hours  polyethylene glycol 3350 17 Gram(s) Oral two times a day  rifAXIMin 550 milliGRAM(s) Oral two times a day  thiamine 100 milliGRAM(s) Oral daily  vancomycin  IVPB 1250 milliGRAM(s) IV Intermittent every 12 hours      PHYSICAL EXAM:   Vital Signs:  Vital Signs Last 24 Hrs  T(C): 37.7 (21 Sep 2024 16:00), Max: 37.9 (20 Sep 2024 20:00)  T(F): 99.9 (21 Sep 2024 16:00), Max: 100.2 (20 Sep 2024 20:00)  HR: 97 (21 Sep 2024 18:00) (93 - 104)  BP: --  BP(mean): --  RR: 19 (21 Sep 2024 18:00) (13 - 24)  SpO2: 100% (21 Sep 2024 18:00) (99% - 100%)    Parameters below as of 21 Sep 2024 03:10  Patient On (Oxygen Delivery Method): ventilator    O2 Concentration (%): 21  Daily     Daily     GENERAL: intubated, off sedation x48 hours  NEURO: eye opening at most. +gag/cough reflex and spontaneous breathing  HEENT: NCAT, no conjunctival pallor appreciated  CHEST: no respiratory distress, no accessory muscle use  CARDIAC: regular rate, +S1/S2  ABDOMEN: soft, nondistended  EXTREMITIES: no b/l LE edema  SKIN: no lesions noted                          8.0    8.54  )-----------( 94       ( 21 Sep 2024 13:57 )             26.0     09-21    156[H]  |  123[H]  |  29[H]  ----------------------------<  129[H]  3.9   |  22  |  1.10    Ca    8.6      21 Sep 2024 13:57  Phos  2.7     09-21  Mg     2.6     09-21    TPro  5.3[L]  /  Alb  2.7[L]  /  TBili  6.8[H]  /  DBili  x   /  AST  65[H]  /  ALT  127[H]  /  AlkPhos  96  09-21    LIVER FUNCTIONS - ( 21 Sep 2024 13:57 )  Alb: 2.7 g/dL / Pro: 5.3 g/dL / ALK PHOS: 96 U/L / ALT: 127 U/L / AST: 65 U/L / GGT: x

## 2024-09-21 NOTE — PROGRESS NOTE ADULT - ATTENDING COMMENTS
1. Acute hypoxemic respiratory failure. Pt tolerating PS wean. However, no mental status to protect airway. Continue current PS ventilations.  2. Hepatic encephelopathy. No longer with cerebral edema. ammonia continues to trend down after PLEX . Continue lactulose, Miralax rifaximin.  3..Acute decompensated liver failure due to ETOH. Continue thiamins and folate. Continue NAC  4DVT prophylaxis : SQ heparin  5. GOC; Full code

## 2024-09-21 NOTE — PROGRESS NOTE ADULT - ASSESSMENT
Patient is a 59 year old M with PMHx ETOH abuse with frequent admissions for withdrawal & gastritis admitted to ICU for acute on chronic decompensated liver failure & alcohol withdrawal. Patient now intubated for airway protection. Started on PLEX for Acute liver failure. Pending further evaluation regarding AMS.    # Neuro:  Acute Metabolic Encephalopathy   -Likely secondary to elevated ammonia level along with liver failure.   -CT head showing concerns for cerebral edema or global hypoxic injury  -CTA and CTV- appear to have no major findings  -MRI did not show any acute issues  -vEEG- no major findings  Plan  -Neurology on board  -On PLEX- got 1st round 9/17 and 2nd 9/19. Due for 3rd on 9/20   -Continue Lactulose and Miralax- ammonia still elevated  -Will start Rifaxamin  -If no improvement- will repeat vEEG on Monday 9/23      #Resp:   -Patient intubated for airway protection.   -Continue on Pressure control     #CV:  -Patient not on any vasopressors at this time. Hemodynamically stable.     #GI:  // Acute Decompensated Liver Failure - patient with long history of etoh abuse with evidence of hepatic steatosis on imaging.  Appears to be related to a prior ischemic episode   - RUQ US: Distended gallbladder with wall thickening up to 5 mm and trace pericholecystic fluid without evidence of cholelithiasis or biliary dilatation. Negative sonographic Gibson's sign.   - CTAP: Nondistended gallbladder with diffuse nonspecific wall edema. Severe fatty liver.  - labs significant for thrombocytopenia, transaminitis, anemia, elevated bilirubin & decreased fibrinogen, elevated lactate.   Plan  -Continue Thiamine and Folate  -Continue NAC   -On PLEX  -Hepatology consulted- ordered Lab work recommended by them   -Continue Lactulose and Miralax  -Started Rifaxamin    #Diet  -Started on tube feeds       #Renal:  -No acute issues. Will monitor Cr and urine output.       #ID:  - afebrile, wbc nl  -On ceftriaxone    #Heme:  Concern for DIC   - elevated coags, d-dimer & fibrinogen 40  - S/P 3u cryo & 1 FFP in setting of DIC and liver failure-  - FU CBC. tranfuse for Hg <7   Fibrinogen now improved  Plan  -Will continue to monitor    #Elevated INR  -INR now improved to 1.96  Plan  -Will monitor    #DVT ppx  -Started Heparin 5K tid    #Endo:  - cont to monitor FS Q6H        Patient is a 59 year old M with PMHx ETOH abuse with frequent admissions for withdrawal & gastritis admitted to ICU for acute on chronic decompensated liver failure & alcohol withdrawal. Patient now intubated for airway protection. Started on PLEX for Acute liver failure. Pending further evaluation regarding AMS.    # Neuro:  Acute Metabolic Encephalopathy   -Likely secondary to elevated ammonia level along with liver failure.   -CT head showing concerns for cerebral edema or global hypoxic injury  -CTA and CTV- appear to have no major findings  -MRI did not show any acute issues  -vEEG- no major findings  Plan  -Neurology on board  -On PLEX- got 1st round 9/17 and 2nd 9/19, 3rd round 9/20  -Continue Lactulose and Miralax- ammonia still elevated  -c/w Rifaximin  -If no improvement- will repeat vEEG on Monday 9/23      #Resp:   -Patient intubated for airway protection.   -Continue on Pressure control     #CV:  -Patient not on any vasopressors at this time. Hemodynamically stable.     #GI:  // Acute Decompensated Liver Failure - patient with long history of etoh abuse with evidence of hepatic steatosis on imaging.  Appears to be related to a prior ischemic episode   - RUQ US: Distended gallbladder with wall thickening up to 5 mm and trace pericholecystic fluid without evidence of cholelithiasis or biliary dilatation. Negative sonographic Gibson's sign.   - CTAP: Nondistended gallbladder with diffuse nonspecific wall edema. Severe fatty liver.  - labs significant for thrombocytopenia, transaminitis, anemia, elevated bilirubin & decreased fibrinogen, elevated lactate.   Plan  -Continue Thiamine and Folate  -Continue NAC   -On PLEX  -Hepatology consulted- ordered Lab work recommended by them   -Continue Lactulose and Miralax  -Started Rifaxamin    #Diet  -Started on tube feeds       #Renal:  -No acute issues. Will monitor Cr and urine output.       #ID:  - afebrile, wbc nl  -On ceftriaxone    #Heme:  Concern for DIC   - elevated coags, d-dimer & fibrinogen 40  - S/P 3u cryo & 1 FFP in setting of DIC and liver failure-  - FU CBC. tranfuse for Hg <7   Fibrinogen now improved  Plan  -Will continue to monitor    #Elevated INR  -INR now improved to 1.96  Plan  -Will monitor    #DVT ppx  -Started Heparin 5K tid    #Endo:  - cont to monitor FS Q6H

## 2024-09-21 NOTE — PROGRESS NOTE ADULT - ASSESSMENT
59 years old male with h/o chronic ETOH abuse and dependence (1 bottle of Vodka a day) with long standing hx of alcohol withdrawal and DTs with frequent hospital/ICU admissions, alcoholic gastritis/esophagitis, PUD, HLD, hx of hypoxic respiratory failure requiring intubation due to aspiration PNA (most recent intubation Aril 2020),  staph PNA, COVID-19 infection Dec 2020 presented with abd pain at the OSH.    #Decompensated alcoholic cirrhosis  #GUNJAN  - Calculated MELD3 score on 9/16 34; 9/17 32; 9/19 27; 9/20 22, 9/21 23  - Presented with AMS at OSH. Had multiple hospitalizations in the past for alcohol withdrawal and DT per records. Unclear last alcohol drink. His alcohol level this admission was <10 and Tylenol level was 42 on admission. Now intubated with findings of cerebral edema and concern for shelter.   - Could be due to Tylenol use in the setting of underlying liver disease causing acute elevation in his liver enzymes. However, could also be due to ischemia as he was hypotensive at OSH, concerned for possible sepsis. GB distended concerning for acute cholecystitis, no other sources of infection.  - Other lab serologies: CMV, EBV PCR neg, Hep B/C neg.     Recommendations:   - continue to monitor mental status off sedation  - c/w TF  - s/p PLEX x3  - Q6 labs - ammonia, CMP, phos, INR  - C/w D5 and monitor sugar levels   - Aggressive electrolyte repletion   - Keep MAP >65   - Continue with NAC drip  - Continue with lactulose enema, goal 3-4 BMs per day.   - Patient is unlikely a candidate for LT due to multiple admissions in the past with alcohol withdrawal.

## 2024-09-22 LAB
ALBUMIN SERPL ELPH-MCNC: 2.5 G/DL — LOW (ref 3.3–5)
ALBUMIN SERPL ELPH-MCNC: 2.7 G/DL — LOW (ref 3.3–5)
ALP SERPL-CCNC: 110 U/L — SIGNIFICANT CHANGE UP (ref 40–120)
ALP SERPL-CCNC: 112 U/L — SIGNIFICANT CHANGE UP (ref 40–120)
ALT FLD-CCNC: 127 U/L — HIGH (ref 10–45)
ALT FLD-CCNC: 128 U/L — HIGH (ref 10–45)
AMMONIA BLD-MCNC: 50 UMOL/L — SIGNIFICANT CHANGE UP (ref 11–55)
AMMONIA BLD-MCNC: 50 UMOL/L — SIGNIFICANT CHANGE UP (ref 11–55)
ANION GAP SERPL CALC-SCNC: 8 MMOL/L — SIGNIFICANT CHANGE UP (ref 5–17)
ANION GAP SERPL CALC-SCNC: 9 MMOL/L — SIGNIFICANT CHANGE UP (ref 5–17)
APTT BLD: 28.2 SEC — SIGNIFICANT CHANGE UP (ref 24.5–35.6)
APTT BLD: 29.9 SEC — SIGNIFICANT CHANGE UP (ref 24.5–35.6)
AST SERPL-CCNC: 69 U/L — HIGH (ref 10–40)
AST SERPL-CCNC: 72 U/L — HIGH (ref 10–40)
BASOPHILS # BLD AUTO: 0.01 K/UL — SIGNIFICANT CHANGE UP (ref 0–0.2)
BASOPHILS # BLD AUTO: 0.02 K/UL — SIGNIFICANT CHANGE UP (ref 0–0.2)
BASOPHILS NFR BLD AUTO: 0.1 % — SIGNIFICANT CHANGE UP (ref 0–2)
BASOPHILS NFR BLD AUTO: 0.3 % — SIGNIFICANT CHANGE UP (ref 0–2)
BILIRUB SERPL-MCNC: 7 MG/DL — HIGH (ref 0.2–1.2)
BILIRUB SERPL-MCNC: 7.3 MG/DL — HIGH (ref 0.2–1.2)
BUN SERPL-MCNC: 27 MG/DL — HIGH (ref 7–23)
BUN SERPL-MCNC: 29 MG/DL — HIGH (ref 7–23)
CALCIUM SERPL-MCNC: 8.4 MG/DL — SIGNIFICANT CHANGE UP (ref 8.4–10.5)
CALCIUM SERPL-MCNC: 8.7 MG/DL — SIGNIFICANT CHANGE UP (ref 8.4–10.5)
CHLORIDE SERPL-SCNC: 125 MMOL/L — HIGH (ref 96–108)
CHLORIDE SERPL-SCNC: 126 MMOL/L — HIGH (ref 96–108)
CO2 SERPL-SCNC: 19 MMOL/L — LOW (ref 22–31)
CO2 SERPL-SCNC: 22 MMOL/L — SIGNIFICANT CHANGE UP (ref 22–31)
CREAT SERPL-MCNC: 1.18 MG/DL — SIGNIFICANT CHANGE UP (ref 0.5–1.3)
CREAT SERPL-MCNC: 1.22 MG/DL — SIGNIFICANT CHANGE UP (ref 0.5–1.3)
D DIMER BLD IA.RAPID-MCNC: 2184 NG/ML DDU — HIGH
EGFR: 69 ML/MIN/1.73M2 — SIGNIFICANT CHANGE UP
EGFR: 72 ML/MIN/1.73M2 — SIGNIFICANT CHANGE UP
EOSINOPHIL # BLD AUTO: 0.49 K/UL — SIGNIFICANT CHANGE UP (ref 0–0.5)
EOSINOPHIL # BLD AUTO: 0.54 K/UL — HIGH (ref 0–0.5)
EOSINOPHIL NFR BLD AUTO: 6.2 % — HIGH (ref 0–6)
EOSINOPHIL NFR BLD AUTO: 6.4 % — HIGH (ref 0–6)
FIBRINOGEN PPP-MCNC: 202 MG/DL — SIGNIFICANT CHANGE UP (ref 200–445)
FIBRINOGEN PPP-MCNC: 221 MG/DL — SIGNIFICANT CHANGE UP (ref 200–445)
GAS PNL BLDA: SIGNIFICANT CHANGE UP
GLUCOSE SERPL-MCNC: 125 MG/DL — HIGH (ref 70–99)
GLUCOSE SERPL-MCNC: 135 MG/DL — HIGH (ref 70–99)
GRAM STN FLD: ABNORMAL
HCT VFR BLD CALC: 24 % — LOW (ref 39–50)
HCT VFR BLD CALC: 25.3 % — LOW (ref 39–50)
HGB BLD-MCNC: 7.3 G/DL — LOW (ref 13–17)
HGB BLD-MCNC: 7.8 G/DL — LOW (ref 13–17)
IMM GRANULOCYTES NFR BLD AUTO: 1.2 % — HIGH (ref 0–0.9)
IMM GRANULOCYTES NFR BLD AUTO: 1.4 % — HIGH (ref 0–0.9)
INR BLD: 1.84 RATIO — HIGH (ref 0.85–1.18)
INR BLD: 1.89 RATIO — HIGH (ref 0.85–1.18)
LDH SERPL L TO P-CCNC: 253 U/L — HIGH (ref 50–242)
LYMPHOCYTES # BLD AUTO: 1.81 K/UL — SIGNIFICANT CHANGE UP (ref 1–3.3)
LYMPHOCYTES # BLD AUTO: 1.97 K/UL — SIGNIFICANT CHANGE UP (ref 1–3.3)
LYMPHOCYTES # BLD AUTO: 22.9 % — SIGNIFICANT CHANGE UP (ref 13–44)
LYMPHOCYTES # BLD AUTO: 23.2 % — SIGNIFICANT CHANGE UP (ref 13–44)
MAGNESIUM SERPL-MCNC: 2.7 MG/DL — HIGH (ref 1.6–2.6)
MAGNESIUM SERPL-MCNC: 2.9 MG/DL — HIGH (ref 1.6–2.6)
MCHC RBC-ENTMCNC: 24.3 PG — LOW (ref 27–34)
MCHC RBC-ENTMCNC: 24.3 PG — LOW (ref 27–34)
MCHC RBC-ENTMCNC: 30.4 GM/DL — LOW (ref 32–36)
MCHC RBC-ENTMCNC: 30.8 GM/DL — LOW (ref 32–36)
MCV RBC AUTO: 78.8 FL — LOW (ref 80–100)
MCV RBC AUTO: 80 FL — SIGNIFICANT CHANGE UP (ref 80–100)
MONOCYTES # BLD AUTO: 0.9 K/UL — SIGNIFICANT CHANGE UP (ref 0–0.9)
MONOCYTES # BLD AUTO: 0.93 K/UL — HIGH (ref 0–0.9)
MONOCYTES NFR BLD AUTO: 10.9 % — SIGNIFICANT CHANGE UP (ref 2–14)
MONOCYTES NFR BLD AUTO: 11.4 % — SIGNIFICANT CHANGE UP (ref 2–14)
NEUTROPHILS # BLD AUTO: 4.56 K/UL — SIGNIFICANT CHANGE UP (ref 1.8–7.4)
NEUTROPHILS # BLD AUTO: 4.95 K/UL — SIGNIFICANT CHANGE UP (ref 1.8–7.4)
NEUTROPHILS NFR BLD AUTO: 57.8 % — SIGNIFICANT CHANGE UP (ref 43–77)
NEUTROPHILS NFR BLD AUTO: 58.2 % — SIGNIFICANT CHANGE UP (ref 43–77)
NRBC # BLD: 0 /100 WBCS — SIGNIFICANT CHANGE UP (ref 0–0)
NRBC # BLD: 0 /100 WBCS — SIGNIFICANT CHANGE UP (ref 0–0)
PHOSPHATE SERPL-MCNC: 2.3 MG/DL — LOW (ref 2.5–4.5)
PHOSPHATE SERPL-MCNC: 2.5 MG/DL — SIGNIFICANT CHANGE UP (ref 2.5–4.5)
PLATELET # BLD AUTO: 101 K/UL — LOW (ref 150–400)
PLATELET # BLD AUTO: 101 K/UL — LOW (ref 150–400)
POTASSIUM SERPL-MCNC: 3.9 MMOL/L — SIGNIFICANT CHANGE UP (ref 3.5–5.3)
POTASSIUM SERPL-MCNC: 3.9 MMOL/L — SIGNIFICANT CHANGE UP (ref 3.5–5.3)
POTASSIUM SERPL-SCNC: 3.9 MMOL/L — SIGNIFICANT CHANGE UP (ref 3.5–5.3)
POTASSIUM SERPL-SCNC: 3.9 MMOL/L — SIGNIFICANT CHANGE UP (ref 3.5–5.3)
PROT SERPL-MCNC: 5.3 G/DL — LOW (ref 6–8.3)
PROT SERPL-MCNC: 5.5 G/DL — LOW (ref 6–8.3)
PROTHROM AB SERPL-ACNC: 19.5 SEC — HIGH (ref 9.5–13)
PROTHROM AB SERPL-ACNC: 19.9 SEC — HIGH (ref 9.5–13)
RBC # BLD: 3 M/UL — LOW (ref 4.2–5.8)
RBC # BLD: 3.21 M/UL — LOW (ref 4.2–5.8)
RBC # FLD: 24.2 % — HIGH (ref 10.3–14.5)
RBC # FLD: 24.8 % — HIGH (ref 10.3–14.5)
SODIUM SERPL-SCNC: 153 MMOL/L — HIGH (ref 135–145)
SODIUM SERPL-SCNC: 156 MMOL/L — HIGH (ref 135–145)
SPECIMEN SOURCE: SIGNIFICANT CHANGE UP
WBC # BLD: 7.89 K/UL — SIGNIFICANT CHANGE UP (ref 3.8–10.5)
WBC # BLD: 8.5 K/UL — SIGNIFICANT CHANGE UP (ref 3.8–10.5)
WBC # FLD AUTO: 7.89 K/UL — SIGNIFICANT CHANGE UP (ref 3.8–10.5)
WBC # FLD AUTO: 8.5 K/UL — SIGNIFICANT CHANGE UP (ref 3.8–10.5)

## 2024-09-22 PROCEDURE — 99291 CRITICAL CARE FIRST HOUR: CPT | Mod: GC

## 2024-09-22 PROCEDURE — 99232 SBSQ HOSP IP/OBS MODERATE 35: CPT

## 2024-09-22 RX ORDER — LACTULOSE 10 G/15ML
30 SOLUTION ORAL; RECTAL EVERY 6 HOURS
Refills: 0 | Status: DISCONTINUED | OUTPATIENT
Start: 2024-09-22 | End: 2024-09-24

## 2024-09-22 RX ORDER — POTASSIUM PHOSPHATE, MONOBASIC POTASSIUM PHOSPHATE, DIBASIC 224; 236 MG/ML; MG/ML
15 INJECTION, SOLUTION, CONCENTRATE INTRAVENOUS ONCE
Refills: 0 | Status: COMPLETED | OUTPATIENT
Start: 2024-09-22 | End: 2024-09-22

## 2024-09-22 RX ORDER — CHLORHEXIDINE GLUCONATE ORAL RINSE 1.2 MG/ML
15 SOLUTION DENTAL EVERY 12 HOURS
Refills: 0 | Status: DISCONTINUED | OUTPATIENT
Start: 2024-09-22 | End: 2024-09-23

## 2024-09-22 RX ORDER — SODIUM CHLORIDE IRRIG SOLUTION 0.9 %
1000 SOLUTION, IRRIGATION IRRIGATION
Refills: 0 | Status: DISCONTINUED | OUTPATIENT
Start: 2024-09-22 | End: 2024-09-23

## 2024-09-22 RX ADMIN — Medication 40 MILLIEQUIVALENT(S): at 02:33

## 2024-09-22 RX ADMIN — Medication 50 MICROGRAM(S): at 21:22

## 2024-09-22 RX ADMIN — Medication 50 MICROGRAM(S): at 21:45

## 2024-09-22 RX ADMIN — LACTULOSE 30 GRAM(S): 10 SOLUTION ORAL; RECTAL at 02:29

## 2024-09-22 RX ADMIN — LACTULOSE 30 GRAM(S): 10 SOLUTION ORAL; RECTAL at 20:58

## 2024-09-22 RX ADMIN — MUPIROCIN 1 APPLICATION(S): 20 OINTMENT TOPICAL at 05:18

## 2024-09-22 RX ADMIN — PIPERACILLIN SODIUM AND TAZOBACTAM SODIUM 25 GRAM(S): 12; 1.5 INJECTION, POWDER, LYOPHILIZED, FOR SOLUTION INTRAVENOUS at 05:16

## 2024-09-22 RX ADMIN — MUPIROCIN 1 APPLICATION(S): 20 OINTMENT TOPICAL at 17:45

## 2024-09-22 RX ADMIN — CHLORHEXIDINE GLUCONATE ORAL RINSE 15 MILLILITER(S): 1.2 SOLUTION DENTAL at 17:47

## 2024-09-22 RX ADMIN — LACTULOSE 30 GRAM(S): 10 SOLUTION ORAL; RECTAL at 13:48

## 2024-09-22 RX ADMIN — PIPERACILLIN SODIUM AND TAZOBACTAM SODIUM 25 GRAM(S): 12; 1.5 INJECTION, POWDER, LYOPHILIZED, FOR SOLUTION INTRAVENOUS at 21:03

## 2024-09-22 RX ADMIN — LABETALOL HYDROCHLORIDE 200 MILLIGRAM(S): 200 TABLET, FILM COATED ORAL at 21:03

## 2024-09-22 RX ADMIN — Medication 5000 UNIT(S): at 13:49

## 2024-09-22 RX ADMIN — Medication 100 MILLILITER(S): at 05:16

## 2024-09-22 RX ADMIN — CHLORHEXIDINE GLUCONATE ORAL RINSE 1 APPLICATION(S): 1.2 SOLUTION DENTAL at 05:19

## 2024-09-22 RX ADMIN — PANTOPRAZOLE SODIUM 40 MILLIGRAM(S): 40 TABLET, DELAYED RELEASE ORAL at 17:45

## 2024-09-22 RX ADMIN — POTASSIUM PHOSPHATE, MONOBASIC POTASSIUM PHOSPHATE, DIBASIC 62.5 MILLIMOLE(S): 224; 236 INJECTION, SOLUTION, CONCENTRATE INTRAVENOUS at 11:13

## 2024-09-22 RX ADMIN — LABETALOL HYDROCHLORIDE 200 MILLIGRAM(S): 200 TABLET, FILM COATED ORAL at 05:40

## 2024-09-22 RX ADMIN — LABETALOL HYDROCHLORIDE 200 MILLIGRAM(S): 200 TABLET, FILM COATED ORAL at 13:49

## 2024-09-22 RX ADMIN — PANTOPRAZOLE SODIUM 40 MILLIGRAM(S): 40 TABLET, DELAYED RELEASE ORAL at 05:17

## 2024-09-22 RX ADMIN — FOLIC ACID 1 MILLIGRAM(S): 1 TABLET ORAL at 17:35

## 2024-09-22 RX ADMIN — Medication 5000 UNIT(S): at 21:03

## 2024-09-22 RX ADMIN — VANCOMYCIN HCL-SODIUM CHLORIDE IV SOLN 1.5 GM/250ML-0.9% 166.67 MILLIGRAM(S): 1.5-0.9/25 SOLUTION at 05:17

## 2024-09-22 RX ADMIN — THIAMINE HYDROCHLORIDE 100 MILLIGRAM(S): 100 INJECTION, SOLUTION INTRAMUSCULAR; INTRAVENOUS at 13:49

## 2024-09-22 RX ADMIN — PIPERACILLIN SODIUM AND TAZOBACTAM SODIUM 25 GRAM(S): 12; 1.5 INJECTION, POWDER, LYOPHILIZED, FOR SOLUTION INTRAVENOUS at 13:49

## 2024-09-22 RX ADMIN — Medication 5000 UNIT(S): at 05:17

## 2024-09-22 RX ADMIN — Medication 44.17 GRAM(S): at 17:43

## 2024-09-22 RX ADMIN — CHLORHEXIDINE GLUCONATE ORAL RINSE 15 MILLILITER(S): 1.2 SOLUTION DENTAL at 05:18

## 2024-09-22 NOTE — PROGRESS NOTE ADULT - SUBJECTIVE AND OBJECTIVE BOX
Interval Events:   -NAEON  -opening eyes to verbal stimulus, but not following commands    Hospital Medications:  acetylcysteine IVPB 12 Gram(s) IV Intermittent every 24 hours  chlorhexidine 0.12% Liquid 15 milliLiter(s) Oral Mucosa every 12 hours  chlorhexidine 4% Liquid 1 Application(s) Topical <User Schedule>  fentaNYL    Injectable 50 MICROGram(s) IV Push every 4 hours PRN  folic acid Injectable 1 milliGRAM(s) IV Push daily  heparin   Injectable 5000 Unit(s) SubCutaneous every 8 hours  labetalol 200 milliGRAM(s) Oral three times a day  lactated ringers. 1000 milliLiter(s) IV Continuous <Continuous>  lactulose Syrup 30 Gram(s) Oral every 6 hours  mupirocin 2% Nasal 1 Application(s) Both Nostrils two times a day  pantoprazole  Injectable 40 milliGRAM(s) IV Push two times a day  piperacillin/tazobactam IVPB.. 3.375 Gram(s) IV Intermittent every 8 hours  polyethylene glycol 3350 17 Gram(s) Oral two times a day  potassium phosphate IVPB 15 milliMole(s) IV Intermittent once  rifAXIMin 550 milliGRAM(s) Oral two times a day  thiamine 100 milliGRAM(s) Oral daily  vancomycin  IVPB 1250 milliGRAM(s) IV Intermittent every 12 hours      PHYSICAL EXAM:   Vital Signs:  Vital Signs Last 24 Hrs  T(C): 37.4 (22 Sep 2024 04:00), Max: 37.7 (21 Sep 2024 16:00)  T(F): 99.3 (22 Sep 2024 04:00), Max: 99.9 (21 Sep 2024 16:00)  HR: 83 (22 Sep 2024 08:39) (83 - 98)  BP: --  BP(mean): --  RR: 14 (22 Sep 2024 08:00) (13 - 24)  SpO2: 100% (22 Sep 2024 08:39) (99% - 100%)    Parameters below as of 21 Sep 2024 20:00  Patient On (Oxygen Delivery Method): ventilator    O2 Concentration (%): 21  Daily     Daily     GENERAL: intubated, off sedation x48 hours  NEURO: eye opening to verbal stimulus, but not following simple commands. +gag/cough reflex and spontaneous breathing  HEENT: NCAT, no conjunctival pallor appreciated  CHEST: no respiratory distress, no accessory muscle use  CARDIAC: regular rate, +S1/S2  ABDOMEN: soft, nondistended  EXTREMITIES: no b/l LE edema  SKIN: no lesions noted                        7.8    8.50  )-----------( 101      ( 22 Sep 2024 05:53 )             25.3     09-22    156[H]  |  126[H]  |  29[H]  ----------------------------<  135[H]  3.9   |  22  |  1.22    Ca    8.7      22 Sep 2024 05:53  Phos  2.3     09-22  Mg     2.9     09-22    TPro  5.5[L]  /  Alb  2.7[L]  /  TBili  7.3[H]  /  DBili  x   /  AST  69[H]  /  ALT  128[H]  /  AlkPhos  110  09-22    LIVER FUNCTIONS - ( 22 Sep 2024 05:53 )  Alb: 2.7 g/dL / Pro: 5.5 g/dL / ALK PHOS: 110 U/L / ALT: 128 U/L / AST: 69 U/L / GGT: x

## 2024-09-22 NOTE — PROGRESS NOTE ADULT - ATTENDING COMMENTS
appendix 1. Acute hypoxemic respiratory failure. Pt tolerating PS wean. However, no mental status to protect airway. Continue current PS ventilations.  2. Hepatic encephelopathy. No longer with cerebral edema. ammonia continues to trend down after PLEX . Continue lactulose, Miralax rifaximin. Pt starting to open his eyes spontaneously.  3..Acute decompensated liver failure due to ETOH. Continue thiamins and folate. Continue NAC  4DVT prophylaxis : SQ heparin  5. GOC; Full code E[facility], Cx biopsy, W, Rt and Lt adnexae (appy from Dr Lauren)

## 2024-09-22 NOTE — PROGRESS NOTE ADULT - SUBJECTIVE AND OBJECTIVE BOX
INTERVAL HPI/OVERNIGHT EVENTS: No acute events overnight.     SUBJECTIVE: Patient seen and examined at bedside.       VITAL SIGNS:  ICU Vital Signs Last 24 Hrs  T(C): 37.4 (22 Sep 2024 04:00), Max: 37.7 (21 Sep 2024 16:00)  T(F): 99.3 (22 Sep 2024 04:00), Max: 99.9 (21 Sep 2024 16:00)  HR: 86 (22 Sep 2024 07:00) (86 - 98)  BP: --  BP(mean): --  ABP: 129/70 (22 Sep 2024 07:00) (113/64 - 166/82)  ABP(mean): 96 (22 Sep 2024 07:00) (85 - 118)  RR: 15 (22 Sep 2024 07:00) (13 - 24)  SpO2: 100% (22 Sep 2024 07:00) (99% - 100%)    O2 Parameters below as of 21 Sep 2024 20:00  Patient On (Oxygen Delivery Method): ventilator    O2 Concentration (%): 21      Mode: CPAP with PS, FiO2: 21, PEEP: 5, PS: 0, MAP: 7  Plateau pressure:   P/F ratio:     09-21 @ 07:01  -  09-22 @ 07:00  --------------------------------------------------------  IN: 6500.8 mL / OUT: 4545 mL / NET: 1955.8 mL      CAPILLARY BLOOD GLUCOSE        ECG:    PHYSICAL EXAM:  GENERAL: intubated and sedated.  HEAD:  Atraumatic, normocephalic.  EYES: EOMI, conjunctiva and sclera clear.  ENT: Moist mucous membranes.  NECK: Supple, trachea midline.  CHEST/LUNG: CTAB. No rales, rhonchi, wheezing, or rubs. Unlabored respirations.  HEART: RRR, no M/R/G, S1/S2  ABDOMEN: Soft, nontender, nondistended, no organomegaly. Normoactive bowel sounds.  EXTREMITIES:  2+ peripheral pulses b/l, brisk capillary refill. Anasarcic extremities  Neurological:  AAOx0, no focal deficits.   SKIN: No rashes or lesions.  PSYCH: Normal affect and mood.     MEDICATIONS:  MEDICATIONS  (STANDING):  acetylcysteine IVPB 12 Gram(s) IV Intermittent every 24 hours  chlorhexidine 0.12% Liquid 15 milliLiter(s) Oral Mucosa every 12 hours  chlorhexidine 4% Liquid 1 Application(s) Topical <User Schedule>  folic acid Injectable 1 milliGRAM(s) IV Push daily  heparin   Injectable 5000 Unit(s) SubCutaneous every 8 hours  labetalol 200 milliGRAM(s) Oral three times a day  lactated ringers. 1000 milliLiter(s) (100 mL/Hr) IV Continuous <Continuous>  lactulose Syrup 30 Gram(s) Oral every 6 hours  mupirocin 2% Nasal 1 Application(s) Both Nostrils two times a day  pantoprazole  Injectable 40 milliGRAM(s) IV Push two times a day  piperacillin/tazobactam IVPB.. 3.375 Gram(s) IV Intermittent every 8 hours  polyethylene glycol 3350 17 Gram(s) Oral two times a day  potassium phosphate IVPB 15 milliMole(s) IV Intermittent once  rifAXIMin 550 milliGRAM(s) Oral two times a day  thiamine 100 milliGRAM(s) Oral daily  vancomycin  IVPB 1250 milliGRAM(s) IV Intermittent every 12 hours    MEDICATIONS  (PRN):  fentaNYL    Injectable 50 MICROGram(s) IV Push every 4 hours PRN Agitation      ALLERGIES:  Allergies    No Known Allergies    Intolerances        LABS:                        7.8    8.50  )-----------( 101      ( 22 Sep 2024 05:53 )             25.3     09-22    156[H]  |  126[H]  |  29[H]  ----------------------------<  135[H]  3.9   |  22  |  1.22    Ca    8.7      22 Sep 2024 05:53  Phos  2.3     09-22  Mg     2.9     09-22    TPro  5.5[L]  /  Alb  2.7[L]  /  TBili  7.3[H]  /  DBili  x   /  AST  69[H]  /  ALT  128[H]  /  AlkPhos  110  09-22    PT/INR - ( 22 Sep 2024 05:53 )   PT: 19.9 sec;   INR: 1.84 ratio         PTT - ( 22 Sep 2024 05:53 )  PTT:29.9 sec  Urinalysis Basic - ( 22 Sep 2024 05:53 )    Color: x / Appearance: x / SG: x / pH: x  Gluc: 135 mg/dL / Ketone: x  / Bili: x / Urobili: x   Blood: x / Protein: x / Nitrite: x   Leuk Esterase: x / RBC: x / WBC x   Sq Epi: x / Non Sq Epi: x / Bacteria: x        RADIOLOGY & ADDITIONAL TESTS: Reviewed.   INTERVAL HPI/OVERNIGHT EVENTS: No acute events overnight.     SUBJECTIVE: Patient seen and examined at bedside. Was seen opening eyes to voice certain times. Otherwise, still not following commands.       VITAL SIGNS:  ICU Vital Signs Last 24 Hrs  T(C): 37.4 (22 Sep 2024 04:00), Max: 37.7 (21 Sep 2024 16:00)  T(F): 99.3 (22 Sep 2024 04:00), Max: 99.9 (21 Sep 2024 16:00)  HR: 86 (22 Sep 2024 07:00) (86 - 98)  BP: --  BP(mean): --  ABP: 129/70 (22 Sep 2024 07:00) (113/64 - 166/82)  ABP(mean): 96 (22 Sep 2024 07:00) (85 - 118)  RR: 15 (22 Sep 2024 07:00) (13 - 24)  SpO2: 100% (22 Sep 2024 07:00) (99% - 100%)    O2 Parameters below as of 21 Sep 2024 20:00  Patient On (Oxygen Delivery Method): ventilator    O2 Concentration (%): 21      Mode: CPAP with PS, FiO2: 21, PEEP: 5, PS: 0, MAP: 7  Plateau pressure:   P/F ratio:     09-21 @ 07:01  -  09-22 @ 07:00  --------------------------------------------------------  IN: 6500.8 mL / OUT: 4545 mL / NET: 1955.8 mL      CAPILLARY BLOOD GLUCOSE        ECG:    PHYSICAL EXAM:  GENERAL: intubated and sedated.  HEAD:  Atraumatic, normocephalic.  EYES: EOMI, conjunctiva and sclera clear.  ENT: Moist mucous membranes.  NECK: Supple, trachea midline.  CHEST/LUNG: CTAB. No rales, rhonchi, wheezing, or rubs. Unlabored respirations.  HEART: RRR, no M/R/G, S1/S2  ABDOMEN: Soft, nontender, nondistended, no organomegaly. Normoactive bowel sounds.  EXTREMITIES:  2+ peripheral pulses b/l, brisk capillary refill. Anasarcic extremities  Neurological:  AAOx0, no focal deficits.   SKIN: No rashes or lesions.  PSYCH: Normal affect and mood.     MEDICATIONS:  MEDICATIONS  (STANDING):  acetylcysteine IVPB 12 Gram(s) IV Intermittent every 24 hours  chlorhexidine 0.12% Liquid 15 milliLiter(s) Oral Mucosa every 12 hours  chlorhexidine 4% Liquid 1 Application(s) Topical <User Schedule>  folic acid Injectable 1 milliGRAM(s) IV Push daily  heparin   Injectable 5000 Unit(s) SubCutaneous every 8 hours  labetalol 200 milliGRAM(s) Oral three times a day  lactated ringers. 1000 milliLiter(s) (100 mL/Hr) IV Continuous <Continuous>  lactulose Syrup 30 Gram(s) Oral every 6 hours  mupirocin 2% Nasal 1 Application(s) Both Nostrils two times a day  pantoprazole  Injectable 40 milliGRAM(s) IV Push two times a day  piperacillin/tazobactam IVPB.. 3.375 Gram(s) IV Intermittent every 8 hours  polyethylene glycol 3350 17 Gram(s) Oral two times a day  potassium phosphate IVPB 15 milliMole(s) IV Intermittent once  rifAXIMin 550 milliGRAM(s) Oral two times a day  thiamine 100 milliGRAM(s) Oral daily  vancomycin  IVPB 1250 milliGRAM(s) IV Intermittent every 12 hours    MEDICATIONS  (PRN):  fentaNYL    Injectable 50 MICROGram(s) IV Push every 4 hours PRN Agitation      ALLERGIES:  Allergies    No Known Allergies    Intolerances        LABS:                        7.8    8.50  )-----------( 101      ( 22 Sep 2024 05:53 )             25.3     09-22    156[H]  |  126[H]  |  29[H]  ----------------------------<  135[H]  3.9   |  22  |  1.22    Ca    8.7      22 Sep 2024 05:53  Phos  2.3     09-22  Mg     2.9     09-22    TPro  5.5[L]  /  Alb  2.7[L]  /  TBili  7.3[H]  /  DBili  x   /  AST  69[H]  /  ALT  128[H]  /  AlkPhos  110  09-22    PT/INR - ( 22 Sep 2024 05:53 )   PT: 19.9 sec;   INR: 1.84 ratio         PTT - ( 22 Sep 2024 05:53 )  PTT:29.9 sec  Urinalysis Basic - ( 22 Sep 2024 05:53 )    Color: x / Appearance: x / SG: x / pH: x  Gluc: 135 mg/dL / Ketone: x  / Bili: x / Urobili: x   Blood: x / Protein: x / Nitrite: x   Leuk Esterase: x / RBC: x / WBC x   Sq Epi: x / Non Sq Epi: x / Bacteria: x        RADIOLOGY & ADDITIONAL TESTS: Reviewed.

## 2024-09-22 NOTE — PROGRESS NOTE ADULT - ATTENDING COMMENTS
59-year-old male with a history of chronic alcohol abuse, frequent hospitalizations for alcohol withdrawal, and decompensated alcoholic cirrhosis now presenting with acute liver failure (GUNJAN) and concern for ischemic hepatitis. The ongoing management plan including aggressive electrolyte repletion, continuous NAC drip, and bowel regimen with lactulose is appropriate. Mental status much improved today, and he is following commands. We will discuss transplant candidacy after extubation.

## 2024-09-22 NOTE — PROGRESS NOTE ADULT - ASSESSMENT
Patient is a 59 year old M with PMHx ETOH abuse with frequent admissions for withdrawal & gastritis admitted to ICU for acute on chronic decompensated liver failure & alcohol withdrawal. Patient now intubated for airway protection. Started on PLEX for Acute liver failure. Pending further evaluation regarding AMS.    # Neuro:  Acute Metabolic Encephalopathy   -Likely secondary to elevated ammonia level along with liver failure.   -CT head showing concerns for cerebral edema or global hypoxic injury  -CTA and CTV- appear to have no major findings  -MRI did not show any acute issues  -vEEG- no major findings  Plan  -Neurology on board  -On PLEX- got 1st round 9/17 and 2nd 9/19, 3rd round 9/20  -Continue Lactulose and Miralax- ammonia still elevated  -c/w Rifaximin  -If no improvement- will repeat vEEG on Monday 9/23      #Resp:   -Patient intubated for airway protection.   -Continue on Pressure control     #CV:  -Patient not on any vasopressors at this time. Hemodynamically stable.     #GI:  // Acute Decompensated Liver Failure - patient with long history of etoh abuse with evidence of hepatic steatosis on imaging.  Appears to be related to a prior ischemic episode   - RUQ US: Distended gallbladder with wall thickening up to 5 mm and trace pericholecystic fluid without evidence of cholelithiasis or biliary dilatation. Negative sonographic Gibson's sign.   - CTAP: Nondistended gallbladder with diffuse nonspecific wall edema. Severe fatty liver.  - labs significant for thrombocytopenia, transaminitis, anemia, elevated bilirubin & decreased fibrinogen, elevated lactate.   Plan  -Continue Thiamine and Folate  -Continue NAC   -On PLEX  -Hepatology consulted- ordered Lab work recommended by them   -Continue Lactulose and Miralax  -Started Rifaxamin    #Diet  -Started on tube feeds       #Renal:  -No acute issues. Will monitor Cr and urine output.       #ID:  - afebrile, wbc nl  -On ceftriaxone    #Heme:  Concern for DIC   - elevated coags, d-dimer & fibrinogen 40  - S/P 3u cryo & 1 FFP in setting of DIC and liver failure-  - FU CBC. tranfuse for Hg <7   Fibrinogen now improved  Plan  -Will continue to monitor    #Elevated INR  -INR now improved to 1.96  Plan  -Will monitor    #DVT ppx  -Started Heparin 5K tid    #Endo:  - cont to monitor FS Q6H        Patient is a 59 year old M with PMHx ETOH abuse with frequent admissions for withdrawal & gastritis admitted to ICU for acute on chronic decompensated liver failure & alcohol withdrawal. Patient now intubated for airway protection. Started on PLEX for Acute liver failure. Pending further evaluation regarding AMS.    # Neuro:  Acute Metabolic Encephalopathy   -Likely secondary to elevated ammonia level along with liver failure.   -CT head showing concerns for cerebral edema or global hypoxic injury  -CTA and CTV- appear to have no major findings  -MRI did not show any acute issues  -vEEG- no major findings  Plan  -Neurology on board  -S/P PLEX- got 1st round 9/17 and 2nd 9/19, 3rd round 9/20  -Continue Lactulose and Miralax- ammonia still elevated  -c/w Rifaximin  -If no improvement- will repeat vEEG on Monday 9/23      #Resp:   -Patient intubated for airway protection.   -Continue on Pressure control     #CV:  -Patient not on any vasopressors at this time. Hemodynamically stable.     #GI:  // Acute Decompensated Liver Failure - patient with long history of etoh abuse with evidence of hepatic steatosis on imaging.  Appears to be related to a prior ischemic episode   - RUQ US: Distended gallbladder with wall thickening up to 5 mm and trace pericholecystic fluid without evidence of cholelithiasis or biliary dilatation. Negative sonographic Gibson's sign.   - CTAP: Nondistended gallbladder with diffuse nonspecific wall edema. Severe fatty liver.  - labs significant for thrombocytopenia, transaminitis, anemia, elevated bilirubin & decreased fibrinogen, elevated lactate.   Plan  -Continue Thiamine and Folate  -Continue NAC   -S/P PLEX  -Hepatology consulted- ordered Lab work recommended by them   -Continue Lactulose and Miralax  -Continue Rifaxamin    #Diet  -Started on tube feeds       #Renal:  -No acute issues. Will monitor Cr and urine output.       #ID:  - afebrile, wbc nl  -On ceftriaxone    #Heme:  Concern for DIC   - elevated coags, d-dimer & fibrinogen 40  - S/P 3u cryo & 1 FFP in setting of DIC and liver failure-  - FU CBC. tranfuse for Hg <7   Fibrinogen now improved  Plan  -Will continue to monitor    #Elevated INR  -INR now improved to 1.84  Plan  -Will monitor    #DVT ppx  -Started Heparin 5K tid    #Endo:  - cont to monitor FS Q6H        Patient is a 59 year old M with PMHx ETOH abuse with frequent admissions for withdrawal & gastritis admitted to ICU for acute on chronic decompensated liver failure & alcohol withdrawal. Patient now intubated for airway protection. Started on PLEX for Acute liver failure. Pending further evaluation regarding AMS.    # Neuro:  Acute Metabolic Encephalopathy   -Likely secondary to elevated ammonia level along with liver failure.   -CT head showing concerns for cerebral edema or global hypoxic injury  -CTA and CTV- appear to have no major findings  -MRI did not show any acute issues  -vEEG- no major findings  Plan  -Neurology on board  -S/P PLEX- got 1st round 9/17 and 2nd 9/19, 3rd round 9/20  -Continue Lactulose and Miralax- ammonia still elevated  -c/w Rifaximin  -If no improvement- will repeat vEEG on Monday 9/23      #Resp:   -Patient intubated for airway protection.   -Continue on Pressure control     #CV:  -Patient not on any vasopressors at this time. Hemodynamically stable.     #GI:  // Acute Decompensated Liver Failure - patient with long history of etoh abuse with evidence of hepatic steatosis on imaging.  Appears to be related to a prior ischemic episode   - RUQ US: Distended gallbladder with wall thickening up to 5 mm and trace pericholecystic fluid without evidence of cholelithiasis or biliary dilatation. Negative sonographic Gibson's sign.   - CTAP: Nondistended gallbladder with diffuse nonspecific wall edema. Severe fatty liver.  - labs significant for thrombocytopenia, transaminitis, anemia, elevated bilirubin & decreased fibrinogen, elevated lactate.   Plan  -Continue Thiamine and Folate  -Continue NAC   -S/P PLEX  -Hepatology consulted- ordered Lab work recommended by them   -Continue Lactulose and Miralax  -Continue Rifaxamin    #Diet  -Started on tube feeds       #Renal:  -No acute issues. Will monitor Cr and urine output.       #ID:  - afebrile, wbc nl  -On Vanc and Zosyn    #Heme:  Concern for DIC   - elevated coags, d-dimer & fibrinogen 40  - S/P 3u cryo & 1 FFP in setting of DIC and liver failure-  - FU CBC. tranfuse for Hg <7   Fibrinogen now improved  Plan  -Will continue to monitor    #Elevated INR  -INR now improved to 1.84  Plan  -Will monitor    #DVT ppx  -Started Heparin 5K tid    #Endo:  - cont to monitor FS Q6H        Patient is a 59 year old M with PMHx ETOH abuse with frequent admissions for withdrawal & gastritis admitted to ICU for acute on chronic decompensated liver failure & alcohol withdrawal. Patient now intubated for airway protection. Started on PLEX for Acute liver failure. Pending further evaluation regarding AMS.    # Neuro:  Acute Metabolic Encephalopathy   -Likely secondary to elevated ammonia level along with liver failure.   -CT head showing concerns for cerebral edema or global hypoxic injury  -CTA and CTV- appear to have no major findings  -MRI did not show any acute issues  -vEEG- no major findings  Plan  -Neurology on board  -S/P PLEX- got 1st round 9/17 and 2nd 9/19, 3rd round 9/20  -Continue Lactulose and Miralax- ammonia still elevated  -c/w Rifaximin  -If no improvement- will repeat vEEG on Monday 9/23      #Resp:   -Patient intubated for airway protection.   -Continue on Pressure control     #CV:  -Patient not on any vasopressors at this time. Hemodynamically stable.     #GI:  // Acute Decompensated Liver Failure - patient with long history of etoh abuse with evidence of hepatic steatosis on imaging.  Appears to be related to a prior ischemic episode   - RUQ US: Distended gallbladder with wall thickening up to 5 mm and trace pericholecystic fluid without evidence of cholelithiasis or biliary dilatation. Negative sonographic Gibson's sign.   - CTAP: Nondistended gallbladder with diffuse nonspecific wall edema. Severe fatty liver.  - labs significant for thrombocytopenia, transaminitis, anemia, elevated bilirubin & decreased fibrinogen, elevated lactate.   Plan  -Continue Thiamine and Folate  -Continue NAC   -S/P PLEX  -Hepatology consulted- ordered Lab work recommended by them   -Continue Lactulose and Miralax  -Continue Rifaxamin    #Diet  -Started on tube feeds       #Renal:  -No acute issues. Will monitor Cr and urine output.       #ID:  - afebrile, wbc nl  -Continue Zosyn  -Will stop Vanc    #Heme:  Concern for DIC   - elevated coags, d-dimer & fibrinogen 40  - S/P 3u cryo & 1 FFP in setting of DIC and liver failure-  - FU CBC. tranfuse for Hg <7   Fibrinogen now improved  Plan  -Will continue to monitor    #Elevated INR  -INR now improved to 1.84  Plan  -Will monitor    #DVT ppx  -Started Heparin 5K tid    #Endo:  - cont to monitor FS Q6H     Lines  -Shiley placed 9/17- need to be removed 9/24  -Cindy placed 9/18- will remove today      Patient is a 59 year old M with PMHx ETOH abuse with frequent admissions for withdrawal & gastritis admitted to ICU for acute on chronic decompensated liver failure & alcohol withdrawal. Patient now intubated for airway protection. Started on PLEX for Acute liver failure. Pending further evaluation regarding AMS.    # Neuro:  Acute Metabolic Encephalopathy   -Likely secondary to elevated ammonia level along with liver failure.   -CT head showing concerns for cerebral edema or global hypoxic injury  -CTA and CTV- appear to have no major findings  -MRI did not show any acute issues  -vEEG- no major findings  Plan  -Neurology on board  -S/P PLEX- got 1st round 9/17 and 2nd 9/19, 3rd round 9/20  -Continue Lactulose and Miralax- ammonia still elevated  -c/w Rifaximin  -If no improvement- will repeat vEEG on Monday 9/23      #Resp:   -Patient intubated for airway protection.   -Continue on Pressure control     #CV:  -Patient not on any vasopressors at this time. Hemodynamically stable.     HTN  -On PO Labetalol 200mg TID    #GI:  // Acute Decompensated Liver Failure - patient with long history of etoh abuse with evidence of hepatic steatosis on imaging.  Appears to be related to a prior ischemic episode   - RUQ US: Distended gallbladder with wall thickening up to 5 mm and trace pericholecystic fluid without evidence of cholelithiasis or biliary dilatation. Negative sonographic Gibson's sign.   - CTAP: Nondistended gallbladder with diffuse nonspecific wall edema. Severe fatty liver.  - labs significant for thrombocytopenia, transaminitis, anemia, elevated bilirubin & decreased fibrinogen, elevated lactate.   Plan  -Continue Thiamine and Folate  -Continue NAC   -S/P PLEX  -Hepatology consulted- ordered Lab work recommended by them   -Continue Lactulose and Miralax  -Continue Rifaxamin    #Diet  -Started on tube feeds       #Renal:  -No acute issues. Will monitor Cr and urine output.       #ID:  - afebrile, wbc nl  -Continue Zosyn  -Will stop Vanc    #Heme:  Concern for DIC   - elevated coags, d-dimer & fibrinogen 40  - S/P 3u cryo & 1 FFP in setting of DIC and liver failure-  - FU CBC. tranfuse for Hg <7   Fibrinogen now improved  Plan  -Will continue to monitor    #Elevated INR  -INR now improved to 1.84  Plan  -Will monitor    #DVT ppx  -Started Heparin 5K tid    #Endo:  - cont to monitor FS Q6H     Lines  -Tristian placed 9/17- need to be removed 9/24  -Cindy placed 9/18- will remove today

## 2024-09-22 NOTE — PROGRESS NOTE ADULT - ASSESSMENT
59 years old male with h/o chronic ETOH abuse and dependence (1 bottle of Vodka a day) with long standing hx of alcohol withdrawal and DTs with frequent hospital/ICU admissions, alcoholic gastritis/esophagitis, PUD, HLD, hx of hypoxic respiratory failure requiring intubation due to aspiration PNA (most recent intubation Aril 2020),  staph PNA, COVID-19 infection Dec 2020 presented with abd pain at the OSH.    #Decompensated alcoholic cirrhosis  #GUNJAN  - Calculated MELD3 score on 9/16 34; 9/17 32; 9/19 27; 9/20 22, 9/21 23, 9/22 4  - Presented with AMS at OSH. Had multiple hospitalizations in the past for alcohol withdrawal and DT per records. Unclear last alcohol drink. His alcohol level this admission was <10 and Tylenol level was 42 on admission. Now intubated with findings of cerebral edema and concern for GUNJAN.   - Could be due to Tylenol use in the setting of underlying liver disease causing acute elevation in his liver enzymes. However, could also be due to ischemia as he was hypotensive at OSH, concerned for possible sepsis. GB distended concerning for acute cholecystitis, no other sources of infection.  - Other lab serologies: CMV, EBV PCR neg, Hep B/C neg.     Recommendations:   - continue to monitor mental status off sedation  - c/w TF  - s/p PLEX x3  - Q6 labs - ammonia, CMP, phos, INR  - C/w D5 and monitor sugar levels   - Aggressive electrolyte repletion   - Keep MAP >65   - Continue with NAC drip  - Continue with lactulose enema, goal 3-4 BMs per day.   - Patient is unlikely a candidate for LT due to multiple admissions in the past with alcohol withdrawal.

## 2024-09-23 ENCOUNTER — RESULT REVIEW (OUTPATIENT)
Age: 59
End: 2024-09-23

## 2024-09-23 LAB
ALBUMIN SERPL ELPH-MCNC: 2.4 G/DL — LOW (ref 3.3–5)
ALBUMIN SERPL ELPH-MCNC: 2.7 G/DL — LOW (ref 3.3–5)
ALP SERPL-CCNC: 107 U/L — SIGNIFICANT CHANGE UP (ref 40–120)
ALP SERPL-CCNC: 113 U/L — SIGNIFICANT CHANGE UP (ref 40–120)
ALT FLD-CCNC: 133 U/L — HIGH (ref 10–45)
ALT FLD-CCNC: 153 U/L — HIGH (ref 10–45)
AMMONIA BLD-MCNC: 46 UMOL/L — SIGNIFICANT CHANGE UP (ref 11–55)
ANION GAP SERPL CALC-SCNC: 11 MMOL/L — SIGNIFICANT CHANGE UP (ref 5–17)
ANION GAP SERPL CALC-SCNC: 9 MMOL/L — SIGNIFICANT CHANGE UP (ref 5–17)
APTT BLD: 40.3 SEC — HIGH (ref 24.5–35.6)
APTT BLD: 42.5 SEC — HIGH (ref 24.5–35.6)
AST SERPL-CCNC: 79 U/L — HIGH (ref 10–40)
AST SERPL-CCNC: 95 U/L — HIGH (ref 10–40)
BASE EXCESS BLDA CALC-SCNC: -1.5 MMOL/L — SIGNIFICANT CHANGE UP (ref -2–3)
BASOPHILS # BLD AUTO: 0.03 K/UL — SIGNIFICANT CHANGE UP (ref 0–0.2)
BASOPHILS # BLD AUTO: 0.05 K/UL — SIGNIFICANT CHANGE UP (ref 0–0.2)
BASOPHILS NFR BLD AUTO: 0.3 % — SIGNIFICANT CHANGE UP (ref 0–2)
BASOPHILS NFR BLD AUTO: 0.4 % — SIGNIFICANT CHANGE UP (ref 0–2)
BILIRUB SERPL-MCNC: 7.2 MG/DL — HIGH (ref 0.2–1.2)
BILIRUB SERPL-MCNC: 8.8 MG/DL — HIGH (ref 0.2–1.2)
BLD GP AB SCN SERPL QL: NEGATIVE — SIGNIFICANT CHANGE UP
BUN SERPL-MCNC: 28 MG/DL — HIGH (ref 7–23)
BUN SERPL-MCNC: 28 MG/DL — HIGH (ref 7–23)
CALCIUM SERPL-MCNC: 8.5 MG/DL — SIGNIFICANT CHANGE UP (ref 8.4–10.5)
CALCIUM SERPL-MCNC: 8.6 MG/DL — SIGNIFICANT CHANGE UP (ref 8.4–10.5)
CHLORIDE SERPL-SCNC: 126 MMOL/L — HIGH (ref 96–108)
CHLORIDE SERPL-SCNC: 126 MMOL/L — HIGH (ref 96–108)
CO2 BLDA-SCNC: 22 MMOL/L — SIGNIFICANT CHANGE UP (ref 19–24)
CO2 SERPL-SCNC: 19 MMOL/L — LOW (ref 22–31)
CO2 SERPL-SCNC: 20 MMOL/L — LOW (ref 22–31)
CREAT SERPL-MCNC: 1.27 MG/DL — SIGNIFICANT CHANGE UP (ref 0.5–1.3)
CREAT SERPL-MCNC: 1.38 MG/DL — HIGH (ref 0.5–1.3)
CULTURE RESULTS: ABNORMAL
D DIMER BLD IA.RAPID-MCNC: 2413 NG/ML DDU — HIGH
EGFR: 60 ML/MIN/1.73M2 — SIGNIFICANT CHANGE UP
EGFR: 66 ML/MIN/1.73M2 — SIGNIFICANT CHANGE UP
EOSINOPHIL # BLD AUTO: 0.36 K/UL — SIGNIFICANT CHANGE UP (ref 0–0.5)
EOSINOPHIL # BLD AUTO: 0.39 K/UL — SIGNIFICANT CHANGE UP (ref 0–0.5)
EOSINOPHIL NFR BLD AUTO: 3.3 % — SIGNIFICANT CHANGE UP (ref 0–6)
EOSINOPHIL NFR BLD AUTO: 4 % — SIGNIFICANT CHANGE UP (ref 0–6)
FIBRINOGEN PPP-MCNC: 181 MG/DL — LOW (ref 200–445)
FIBRINOGEN PPP-MCNC: 209 MG/DL — SIGNIFICANT CHANGE UP (ref 200–445)
GAS PNL BLDA: SIGNIFICANT CHANGE UP
GAS PNL BLDA: SIGNIFICANT CHANGE UP
GLUCOSE SERPL-MCNC: 131 MG/DL — HIGH (ref 70–99)
GLUCOSE SERPL-MCNC: 97 MG/DL — SIGNIFICANT CHANGE UP (ref 70–99)
HCO3 BLDA-SCNC: 21 MMOL/L — SIGNIFICANT CHANGE UP (ref 21–28)
HCT VFR BLD CALC: 21.2 % — LOW (ref 39–50)
HCT VFR BLD CALC: 24.7 % — LOW (ref 39–50)
HGB BLD-MCNC: 6.4 G/DL — CRITICAL LOW (ref 13–17)
HGB BLD-MCNC: 7.9 G/DL — LOW (ref 13–17)
HOROWITZ INDEX BLDA+IHG-RTO: 30 — SIGNIFICANT CHANGE UP
IMM GRANULOCYTES NFR BLD AUTO: 1.2 % — HIGH (ref 0–0.9)
IMM GRANULOCYTES NFR BLD AUTO: 1.8 % — HIGH (ref 0–0.9)
INR BLD: 1.61 RATIO — HIGH (ref 0.85–1.18)
INR BLD: 1.93 RATIO — HIGH (ref 0.85–1.18)
LDH SERPL L TO P-CCNC: 265 U/L — HIGH (ref 50–242)
LYMPHOCYTES # BLD AUTO: 1.68 K/UL — SIGNIFICANT CHANGE UP (ref 1–3.3)
LYMPHOCYTES # BLD AUTO: 18.9 % — SIGNIFICANT CHANGE UP (ref 13–44)
LYMPHOCYTES # BLD AUTO: 2.4 K/UL — SIGNIFICANT CHANGE UP (ref 1–3.3)
LYMPHOCYTES # BLD AUTO: 20.2 % — SIGNIFICANT CHANGE UP (ref 13–44)
MAGNESIUM SERPL-MCNC: 2.7 MG/DL — HIGH (ref 1.6–2.6)
MAGNESIUM SERPL-MCNC: 2.8 MG/DL — HIGH (ref 1.6–2.6)
MCHC RBC-ENTMCNC: 24.2 PG — LOW (ref 27–34)
MCHC RBC-ENTMCNC: 25.4 PG — LOW (ref 27–34)
MCHC RBC-ENTMCNC: 30.2 GM/DL — LOW (ref 32–36)
MCHC RBC-ENTMCNC: 32 GM/DL — SIGNIFICANT CHANGE UP (ref 32–36)
MCV RBC AUTO: 79.4 FL — LOW (ref 80–100)
MCV RBC AUTO: 80 FL — SIGNIFICANT CHANGE UP (ref 80–100)
MONOCYTES # BLD AUTO: 1.11 K/UL — HIGH (ref 0–0.9)
MONOCYTES # BLD AUTO: 1.36 K/UL — HIGH (ref 0–0.9)
MONOCYTES NFR BLD AUTO: 11.4 % — SIGNIFICANT CHANGE UP (ref 2–14)
MONOCYTES NFR BLD AUTO: 12.5 % — SIGNIFICANT CHANGE UP (ref 2–14)
NEUTROPHILS # BLD AUTO: 5.61 K/UL — SIGNIFICANT CHANGE UP (ref 1.8–7.4)
NEUTROPHILS # BLD AUTO: 7.5 K/UL — HIGH (ref 1.8–7.4)
NEUTROPHILS NFR BLD AUTO: 62.9 % — SIGNIFICANT CHANGE UP (ref 43–77)
NEUTROPHILS NFR BLD AUTO: 63.1 % — SIGNIFICANT CHANGE UP (ref 43–77)
NRBC # BLD: 0 /100 WBCS — SIGNIFICANT CHANGE UP (ref 0–0)
NRBC # BLD: 0 /100 WBCS — SIGNIFICANT CHANGE UP (ref 0–0)
PCO2 BLDA: 30 MMHG — LOW (ref 35–48)
PH BLDA: 7.46 — HIGH (ref 7.35–7.45)
PHOSPHATE SERPL-MCNC: 2.3 MG/DL — LOW (ref 2.5–4.5)
PHOSPHATE SERPL-MCNC: 2.8 MG/DL — SIGNIFICANT CHANGE UP (ref 2.5–4.5)
PLATELET # BLD AUTO: 103 K/UL — LOW (ref 150–400)
PLATELET # BLD AUTO: 107 K/UL — LOW (ref 150–400)
PO2 BLDA: 120 MMHG — HIGH (ref 83–108)
POTASSIUM SERPL-MCNC: 3.6 MMOL/L — SIGNIFICANT CHANGE UP (ref 3.5–5.3)
POTASSIUM SERPL-MCNC: 3.7 MMOL/L — SIGNIFICANT CHANGE UP (ref 3.5–5.3)
POTASSIUM SERPL-SCNC: 3.6 MMOL/L — SIGNIFICANT CHANGE UP (ref 3.5–5.3)
POTASSIUM SERPL-SCNC: 3.7 MMOL/L — SIGNIFICANT CHANGE UP (ref 3.5–5.3)
PROT SERPL-MCNC: 5.3 G/DL — LOW (ref 6–8.3)
PROT SERPL-MCNC: 6 G/DL — SIGNIFICANT CHANGE UP (ref 6–8.3)
PROTHROM AB SERPL-ACNC: 17.5 SEC — HIGH (ref 9.5–13)
PROTHROM AB SERPL-ACNC: 19.9 SEC — HIGH (ref 9.5–13)
PYRIDOXAL PHOS SERPL-MCNC: 48.6 UG/L — SIGNIFICANT CHANGE UP (ref 3.4–65.2)
RBC # BLD: 2.65 M/UL — LOW (ref 4.2–5.8)
RBC # BLD: 3.11 M/UL — LOW (ref 4.2–5.8)
RBC # FLD: 23.6 % — HIGH (ref 10.3–14.5)
RBC # FLD: 24.7 % — HIGH (ref 10.3–14.5)
RH IG SCN BLD-IMP: POSITIVE — SIGNIFICANT CHANGE UP
SAO2 % BLDA: 99.3 % — HIGH (ref 94–98)
SODIUM SERPL-SCNC: 155 MMOL/L — HIGH (ref 135–145)
SODIUM SERPL-SCNC: 156 MMOL/L — HIGH (ref 135–145)
SPECIMEN SOURCE: SIGNIFICANT CHANGE UP
WBC # BLD: 11.91 K/UL — HIGH (ref 3.8–10.5)
WBC # BLD: 8.9 K/UL — SIGNIFICANT CHANGE UP (ref 3.8–10.5)
WBC # FLD AUTO: 11.91 K/UL — HIGH (ref 3.8–10.5)
WBC # FLD AUTO: 8.9 K/UL — SIGNIFICANT CHANGE UP (ref 3.8–10.5)

## 2024-09-23 PROCEDURE — 93306 TTE W/DOPPLER COMPLETE: CPT | Mod: 26

## 2024-09-23 PROCEDURE — 99291 CRITICAL CARE FIRST HOUR: CPT

## 2024-09-23 PROCEDURE — 99291 CRITICAL CARE FIRST HOUR: CPT | Mod: GC

## 2024-09-23 RX ORDER — FUROSEMIDE 10 MG/ML
40 INJECTION INTRAVENOUS ONCE
Refills: 0 | Status: COMPLETED | OUTPATIENT
Start: 2024-09-23 | End: 2024-09-23

## 2024-09-23 RX ORDER — PHYTONADIONE (VIT K1)
10 CRYSTALS MISCELLANEOUS ONCE
Refills: 0 | Status: COMPLETED | OUTPATIENT
Start: 2024-09-23 | End: 2024-09-23

## 2024-09-23 RX ADMIN — LABETALOL HYDROCHLORIDE 200 MILLIGRAM(S): 200 TABLET, FILM COATED ORAL at 05:41

## 2024-09-23 RX ADMIN — Medication 5000 UNIT(S): at 16:56

## 2024-09-23 RX ADMIN — PIPERACILLIN SODIUM AND TAZOBACTAM SODIUM 25 GRAM(S): 12; 1.5 INJECTION, POWDER, LYOPHILIZED, FOR SOLUTION INTRAVENOUS at 05:41

## 2024-09-23 RX ADMIN — THIAMINE HYDROCHLORIDE 100 MILLIGRAM(S): 100 INJECTION, SOLUTION INTRAMUSCULAR; INTRAVENOUS at 13:25

## 2024-09-23 RX ADMIN — FUROSEMIDE 40 MILLIGRAM(S): 10 INJECTION INTRAVENOUS at 10:19

## 2024-09-23 RX ADMIN — CHLORHEXIDINE GLUCONATE ORAL RINSE 15 MILLILITER(S): 1.2 SOLUTION DENTAL at 05:40

## 2024-09-23 RX ADMIN — Medication 50 MICROGRAM(S): at 06:00

## 2024-09-23 RX ADMIN — LABETALOL HYDROCHLORIDE 200 MILLIGRAM(S): 200 TABLET, FILM COATED ORAL at 22:01

## 2024-09-23 RX ADMIN — CHLORHEXIDINE GLUCONATE ORAL RINSE 1 APPLICATION(S): 1.2 SOLUTION DENTAL at 05:42

## 2024-09-23 RX ADMIN — Medication 5000 UNIT(S): at 05:41

## 2024-09-23 RX ADMIN — MUPIROCIN 1 APPLICATION(S): 20 OINTMENT TOPICAL at 17:10

## 2024-09-23 RX ADMIN — Medication 50 MICROGRAM(S): at 01:25

## 2024-09-23 RX ADMIN — LACTULOSE 30 GRAM(S): 10 SOLUTION ORAL; RECTAL at 01:25

## 2024-09-23 RX ADMIN — LACTULOSE 30 GRAM(S): 10 SOLUTION ORAL; RECTAL at 09:06

## 2024-09-23 RX ADMIN — PANTOPRAZOLE SODIUM 40 MILLIGRAM(S): 40 TABLET, DELAYED RELEASE ORAL at 17:09

## 2024-09-23 RX ADMIN — MUPIROCIN 1 APPLICATION(S): 20 OINTMENT TOPICAL at 05:41

## 2024-09-23 RX ADMIN — PIPERACILLIN SODIUM AND TAZOBACTAM SODIUM 25 GRAM(S): 12; 1.5 INJECTION, POWDER, LYOPHILIZED, FOR SOLUTION INTRAVENOUS at 14:55

## 2024-09-23 RX ADMIN — Medication 50 MICROGRAM(S): at 05:40

## 2024-09-23 RX ADMIN — LABETALOL HYDROCHLORIDE 200 MILLIGRAM(S): 200 TABLET, FILM COATED ORAL at 13:26

## 2024-09-23 RX ADMIN — Medication 102 MILLIGRAM(S): at 01:25

## 2024-09-23 RX ADMIN — PANTOPRAZOLE SODIUM 40 MILLIGRAM(S): 40 TABLET, DELAYED RELEASE ORAL at 05:40

## 2024-09-23 RX ADMIN — PIPERACILLIN SODIUM AND TAZOBACTAM SODIUM 25 GRAM(S): 12; 1.5 INJECTION, POWDER, LYOPHILIZED, FOR SOLUTION INTRAVENOUS at 22:01

## 2024-09-23 RX ADMIN — Medication 5000 UNIT(S): at 22:01

## 2024-09-23 RX ADMIN — Medication 50 MICROGRAM(S): at 01:45

## 2024-09-23 NOTE — PROGRESS NOTE ADULT - ASSESSMENT
Patient is a 59 year old M with PMHx ETOH abuse with frequent admissions for withdrawal & gastritis admitted to ICU for acute on chronic decompensated liver failure & alcohol withdrawal. Patient now intubated for airway protection. Started on PLEX for Acute liver failure. Pending further evaluation regarding AMS.    # Neuro:  Acute Metabolic Encephalopathy   -Likely secondary to elevated ammonia level along with liver failure.   -CT head showing concerns for cerebral edema or global hypoxic injury  -CTA and CTV- appear to have no major findings  -MRI did not show any acute issues  -vEEG- no major findings  Plan  -Neurology on board  -S/P PLEX- got 1st round 9/17 and 2nd 9/19, 3rd round 9/20  -Continue Lactulose and Miralax- ammonia still elevated  -c/w Rifaximin  -If no improvement- will repeat vEEG on Monday 9/23      #Resp:   -Patient intubated for airway protection.   -Continue on Pressure control     #CV:  -Patient not on any vasopressors at this time. Hemodynamically stable.     HTN  -On PO Labetalol 200mg TID    #GI:  // Acute Decompensated Liver Failure - patient with long history of etoh abuse with evidence of hepatic steatosis on imaging.  Appears to be related to a prior ischemic episode   - RUQ US: Distended gallbladder with wall thickening up to 5 mm and trace pericholecystic fluid without evidence of cholelithiasis or biliary dilatation. Negative sonographic Gibson's sign.   - CTAP: Nondistended gallbladder with diffuse nonspecific wall edema. Severe fatty liver.  - labs significant for thrombocytopenia, transaminitis, anemia, elevated bilirubin & decreased fibrinogen, elevated lactate.   Plan  -Continue Thiamine and Folate  -Continue NAC   -S/P PLEX  -Hepatology consulted- ordered Lab work recommended by them   -Continue Lactulose and Miralax  -Continue Rifaxamin    #Diet  -Started on tube feeds       #Renal:  -No acute issues. Will monitor Cr and urine output.       #ID:  - afebrile, wbc nl  -Continue Zosyn ( 9/23- )   -Will stop Vanc    #Heme:  Concern for DIC   - elevated coags, d-dimer & fibrinogen 40  - S/P 3u cryo & 1 FFP in setting of DIC and liver failure-  - FU CBC. tranfuse for Hg <7   - Fibrinogen now improved  Plan  -Will continue to monitor    #Elevated INR  -INR now improved to 1.84  Plan  -Will monitor    #DVT ppx  -Started Heparin 5K tid    #Endo:  - cont to monitor FS Q6H     Lines  -Traciley placed 9/17- need to be removed 9/24  -Cindy placed 9/18- will remove today      Patient is a 59 year old M with PMHx ETOH abuse with frequent admissions for withdrawal & gastritis admitted to ICU for acute on chronic decompensated liver failure & alcohol withdrawal. Patient now intubated for airway protection. Started on PLEX for Acute liver failure. Pending further evaluation regarding AMS.    # Neuro:  Acute Metabolic Encephalopathy   -Likely secondary to elevated ammonia level along with liver failure.   -CT head showing concerns for cerebral edema or global hypoxic injury  -CTA and CTV- appear to have no major findings  -MRI did not show any acute issues  -vEEG- no major findings  - Ammonia improving     Plan - still encephopathic   - OFF sedation   -Neurology on board  -S/P PLEX- got 1st round 9/17 and 2nd 9/19, 3rd round 9/20, f/u if need for any additional PLEX   -Continue Lactulose and Miralax- ammonia still elevated  -c/w Rifaximin  -Awake and alert, hold off additional vEEG     #Resp:   -Patient intubated for airway protection  - Extubated 9/23    #CV:  -Patient not on any vasopressors at this time. Hemodynamically stable.   - TTE pending     HTN  -On PO Labetalol 200mg TID    #GI:  // Acute Decompensated Liver Failure - patient with long history of etoh abuse with evidence of hepatic steatosis on imaging.  Appears to be related to a prior ischemic episode   - RUQ US: Distended gallbladder with wall thickening up to 5 mm and trace pericholecystic fluid without evidence of cholelithiasis or biliary dilatation. Negative sonographic Gibson's sign.   - CTAP: Nondistended gallbladder with diffuse nonspecific wall edema. Severe fatty liver.  - labs significant for thrombocytopenia, transaminitis, anemia, elevated bilirubin & decreased fibrinogen, elevated lactate.   Plan  -Continue Thiamine and Folate  - s/p NAC, currently off   -S/P PLEX as above   -Hepatology consulted- ordered Lab work recommended by them   -Continue Lactulose and Miralax  -Continue Rifaxamin    #Diet  -Started on tube feeds     #Renal:  -No acute issues. Will monitor Cr and urine output.   Hypervolemic state  - Start Diuresis 40 lasix once 9/23, appears hypervolemic on exam   #hypernatremia   - 350 q6 free water flushes, stable at 155     #ID:  - s/p CTX, Fever 100.9   -Continue Zosyn ( 9/23- )   -Will stop Vanc    #Heme:  Concern for DIC   - elevated coags, d-dimer & fibrinogen 40  - S/P 3u cryo & 1 FFP in setting of DIC and liver failure-  - FU CBC. tranfuse for Hg <7   - Fibrinogen now improved  Plan  -Will continue to monitor    #Elevated INR  -INR now improved to 1.84  Plan  -Will monitor    #anemia - chronic disease- no active bleeding   - total prbcs 1u - 9/23    #DVT ppx  -Started Heparin 5K tid    #Endo:  - cont to monitor FS Q6H     Lines  -Tristian placed 9/17- need to be removed 9/24  -Cindy placed 9/18- will remove today

## 2024-09-23 NOTE — CHART NOTE - NSCHARTNOTEFT_GEN_A_CORE
As discussed with DR Suarez, no need for palliative care at this time.  Patient extubated this morning, awake , moderately verbal, following simple commands.  Please reconsult as needed.  Thank you

## 2024-09-23 NOTE — PROGRESS NOTE ADULT - SUBJECTIVE AND OBJECTIVE BOX
INTERVAL HPI/OVERNIGHT EVENTS:1u PRBC given and Vitamin Given for coagulopathy of liver disease     SUBJECTIVE: Patient awake, unable to have meaningful conversation this morning.     ROS: Unable to obtain ROS due to altered mentation     OBJECTIVE:    VITAL SIGNS:  ICU Vital Signs Last 24 Hrs  T(C): 37.7 (23 Sep 2024 08:00), Max: 38.2 (23 Sep 2024 04:00)  T(F): 99.9 (23 Sep 2024 08:00), Max: 100.8 (23 Sep 2024 04:00)  HR: 86 (23 Sep 2024 07:00) (83 - 93)  BP: 145/70 (23 Sep 2024 07:00) (89/53 - 153/71)  BP(mean): 101 (23 Sep 2024 07:00) (69 - 102)  ABP: 122/58 (22 Sep 2024 19:00) (116/62 - 161/82)  ABP(mean): 83 (22 Sep 2024 19:00) (83 - 115)  RR: 19 (23 Sep 2024 07:00) (14 - 24)  SpO2: 100% (23 Sep 2024 07:00) (100% - 100%)    O2 Parameters below as of 22 Sep 2024 20:00  Patient On (Oxygen Delivery Method): ventilator    O2 Concentration (%): 21      Mode: CPAP with PS, FiO2: 21, PEEP: 5, PS: 0, MAP: 7    09-22 @ 07:01  -  09-23 @ 07:00  --------------------------------------------------------  IN: 5790.8 mL / OUT: 2000 mL / NET: 3790.8 mL    09-23 @ 07:01  -  09-23 @ 08:02  --------------------------------------------------------  IN: 124.2 mL / OUT: 0 mL / NET: 124.2 mL      CAPILLARY BLOOD GLUCOSE          PHYSICAL EXAM:    General: NAD  HEENT: NC/AT; PERRL, Sclera Icteric   Neck: supple  Respiratory: CTA b/l, intubated and sedated   Cardiovascular: +S1/S2; RRR  Abdomen: soft, NT/ND; +BS x4  Extremities: WWP, 2+ peripheral pulses b/l; B/L arm and lower extremity edema   Skin: normal color and turgor; no rash  Neurological:    MEDICATIONS:  MEDICATIONS  (STANDING):  acetylcysteine IVPB 12 Gram(s) IV Intermittent every 24 hours  chlorhexidine 0.12% Liquid 15 milliLiter(s) Oral Mucosa every 12 hours  chlorhexidine 4% Liquid 1 Application(s) Topical <User Schedule>  folic acid Injectable 1 milliGRAM(s) IV Push daily  heparin   Injectable 5000 Unit(s) SubCutaneous every 8 hours  labetalol 200 milliGRAM(s) Oral three times a day  lactulose Syrup 30 Gram(s) Oral every 6 hours  mupirocin 2% Nasal 1 Application(s) Both Nostrils two times a day  pantoprazole  Injectable 40 milliGRAM(s) IV Push two times a day  piperacillin/tazobactam IVPB.. 3.375 Gram(s) IV Intermittent every 8 hours  rifAXIMin 550 milliGRAM(s) Oral two times a day  thiamine 100 milliGRAM(s) Oral daily    MEDICATIONS  (PRN):  fentaNYL    Injectable 50 MICROGram(s) IV Push every 4 hours PRN Agitation      ALLERGIES:  Allergies    No Known Allergies    Intolerances        LABS:                        6.4    8.90  )-----------( 103      ( 23 Sep 2024 00:13 )             21.2     09-23    155[H]  |  126[H]  |  28[H]  ----------------------------<  131[H]  3.7   |  20[L]  |  1.27    Ca    8.5      23 Sep 2024 00:14  Phos  2.3     09-23  Mg     2.8     09-23    TPro  5.3[L]  /  Alb  2.4[L]  /  TBili  7.2[H]  /  DBili  x   /  AST  79[H]  /  ALT  133[H]  /  AlkPhos  107  09-23    PT/INR - ( 23 Sep 2024 00:13 )   PT: 19.9 sec;   INR: 1.93 ratio         PTT - ( 23 Sep 2024 00:13 )  PTT:40.3 sec  Urinalysis Basic - ( 23 Sep 2024 00:14 )    Color: x / Appearance: x / SG: x / pH: x  Gluc: 131 mg/dL / Ketone: x  / Bili: x / Urobili: x   Blood: x / Protein: x / Nitrite: x   Leuk Esterase: x / RBC: x / WBC x   Sq Epi: x / Non Sq Epi: x / Bacteria: x        RADIOLOGY & ADDITIONAL TESTS: Reviewed.

## 2024-09-23 NOTE — PATIENT PROFILE ADULT - FALL HARM RISK - FALL HARM RISK
Care of Biopsy Site   Cleanse the biopsy site with mild soap and water.   Thoroughly dry the area and apply a small amount plain petroleum jelly.   Place a small dressing or bandage over the wound.   Continue steps 1-3 until wound is healed.   Call our office if site is red, warm to touch and/or draining or bleeding uncontrollably.   Studies show that wounds heal better when covered with ointment and a dressing.     Care of Cryotherapy Site  Today you had something frozen, an Actinic Keratosis.  These skin growths are destroyed by the freezing action.  The following is what to expect:   Within a few hours to a few days after treatment the area may blister, turn black, or   form a scab. This is a desirable result.   Most patients experience little or no pain from this treatment. If you do experience pain,   you may take aspirin (adults only), ibuprofen or acetaminophen.   Apply plain petroleum jelly to site(s) daily until healed if needed. No bandage is   necessary.   You may drain a blister to help relieve pressure if advised by your doctor.   The scab will fall off as the area heals. Do not pick at scab, let it fall off naturally. It will   take several days to weeks for the scab to fall off depending on the size of the treated   area, size of lesion, type of lesion treated, and your body’s healing ability.   The underlying skin may be red, sensitive to temperature, touch and may possibly itch   as it heals. Normal skin color will eventually return.    No indicators present

## 2024-09-23 NOTE — PROGRESS NOTE ADULT - ATTENDING COMMENTS
Agree with above. Seen and examined with team on rounds. Critically ill on vent requiring frequent bedside visits. Multiple visits to the bedside for titration of drips and vent. Will d/c Shiley. Platelets as needed. Hgb stable. Close follow up of electrolytes with slow Na correction. Supportive care.

## 2024-09-23 NOTE — PROGRESS NOTE ADULT - SUBJECTIVE AND OBJECTIVE BOX
NEUROLOGY FOLLOW-UP CONSULT NOTE    RFC: Cerebral edema    Interval history: No acute neurologic events overnight. Patient has been extubated today and now more interactive, but is still confused. No focal neurologic deficits or abnormal movements noted.    Meds:  MEDICATIONS  (STANDING):  chlorhexidine 4% Liquid 1 Application(s) Topical <User Schedule>  folic acid Injectable 1 milliGRAM(s) IV Push daily  heparin   Injectable 5000 Unit(s) SubCutaneous every 8 hours  labetalol 200 milliGRAM(s) Oral three times a day  lactulose Syrup 30 Gram(s) Oral every 6 hours  mupirocin 2% Nasal 1 Application(s) Both Nostrils two times a day  pantoprazole  Injectable 40 milliGRAM(s) IV Push two times a day  piperacillin/tazobactam IVPB.. 3.375 Gram(s) IV Intermittent every 8 hours  rifAXIMin 550 milliGRAM(s) Oral two times a day  thiamine 100 milliGRAM(s) Oral daily    MEDICATIONS  (PRN):  fentaNYL    Injectable 50 MICROGram(s) IV Push every 4 hours PRN Agitation    GTT:  None    PMHx/PSHx/FHx/SHx:  Liver failure without hepatic coma    Complex Care    No pertinent family history in first degree relatives (Father, Mother)    No pertinent family history in first degree relatives    No pertinent family history in first degree relatives    FH: HTN (hypertension)    Handoff    Alcohol abuse    PUD (peptic ulcer disease)    Mixed hyperlipidemia    EtOH dependence    Gastritis    Substance abuse    DTs (delirium tremens)    HTN (hypertension)    Elevated lipase    Acetaminophen toxicity    No significant past surgical history    HEPATIC FAILURE, UNSPECIFIED W    [id number]        Allergies:  No Known Allergies      ROS: Due to clinical condition unable to assess (ABBEY)    O:  T(C): 37.7 (09-23-24 @ 08:00), Max: 38.2 (09-23-24 @ 04:00)  HR: 81 (09-23-24 @ 11:00) (81 - 93)  BP: 118/56 (09-23-24 @ 11:00) (89/53 - 165/70)  RR: 27 (09-23-24 @ 11:00) (14 - 27)  SpO2: 100% (09-23-24 @ 11:00) (100% - 100%)    Focused neurologic exam:  MS - Facemask on, not sedated, lethargic, attends to all stimuli b/l, limited speech output but reports he is "hungry" and noted to be bradyphrenic with increased latency, rarely follows commands. ABBEY orientation, rep/naming, attn/conc/recent and remote memory/fund of knowledge  CN - PERRL, EOMI by OCR, (+) face sens/str by corneals b/l, (+) spontaneous respirations, VFF to threat b/l, hearing grossly intact to conversation b/l, SCM at least 4/5 b/l and symmetric. ABBEY tongue/palate  Motor - Normal bulk. Flaccid tone throughout and no spontaneous movements noted. No grimace, withdrawal, or posturing to strong tactile stimuli in any extremity  Sens - As per Motor above  DTR's - 2+ and brisk BUE's, 3+ L KJ, 0+ R KJ, 0+ L AJ, 2+ R AJ, and neutral b/l plantar response  Coord - ABBEY  Gait and station - ABBEY    Pertinent labs/studies:  CBC with low H/H 8/21 and MCV dec 80, low plt 103  PT/INR inc 19.9/1.93, PTT inc 40.3  BMP with inc Na 155, inc BUN/Cr 28, otherwise essentially WNL  Albumin dec 2.4, LFT's with inc ALT//79  Mg inc 2.8, Phos dec 2.3  Ceruloplasmin dec 12, Hep screen (-), A1C 5.5%, UA (-), NH3 inc 227 -> 144 -> 94 -> 46 WNL, CPK inc 725, TSH dec 0.05, T4 WNL, B12 WNL, folate WNL, WNV IgG (+)/IgM (-), syphilis serology TP (-), Lyme (-)    < from: MR Head w/wo IV Cont (09.19.24 @ 00:56) >  NO EVIDENCE OF INTRACRANIAL HEMORRHAGE, ACUTE TERRITORIAL   INFARCT OR AREA OF ABNORMAL ENHANCEMENT.    < end of copied text >      vEEG 9/18 -  EEG Classification / Summary:   Abnormal  EEG :   1. Diffuse suppression  Tachycardia     Clinical Impression:  1. Diffuse suppression that is persistent and nonreactive to stimulation which indicates very severe diffuse cerebral dysfunction    There were no seizures or  epileptiform abnormalities recorded.      < from: CT Venogram Brain w/ IV Cont (09.17.24 @ 18:46) >  CT HEAD:  No CT evidence of acute intracranial pathology.    CTA NECK:  No evidence of significant stenosis or occlusion.    The endotracheal tube appears to terminate immediately superior to the   javon.  This may be due to flexion of the head/neck during the CT scan.    Repeat chest radiograph is recommended to confirm proper positioning.    CTA HEAD:  No large vessel occlusion, significant stenosis or vascular abnormality   identified.    CT VENOGRAM:  Focal hypodensity in the right sigmoid sinus, without occlusion, probably   represents streak artifact from overlying EEG leads however nonocclusive   thrombus cannot be excluded.  The right transverse sinus and right   jugular bulb are patent without suspicious filling defect.  Follow-up MR   venogram is recommended for further evaluation    < end of copied text >      < from: CT Head No Cont (09.17.24 @ 09:09) >    Diffuse sulcal effacement and loss of gray-white   differentiation as compared to the prior head CT may suggest cerebral   edema versus global hypoxic ischemic injury. MRI is recommended for   further evaluation.    < end of copied text >

## 2024-09-23 NOTE — AIRWAY REMOVAL NOTE  ADULT & PEDS - ARTIFICAL AIRWAY REMOVAL COMMENTS
Written order for extubation verified. The patient was identified by full name and birth date compared to the identification band. Present during the procedure was Carolyn RN.

## 2024-09-23 NOTE — CHART NOTE - NSCHARTNOTEFT_GEN_A_CORE
EEG preliminary read (not final) on the initial recording hour(s) ~ x 4 hrs    No seizures were recorded.  Moderate slowing noted, nonspecific.  Final report to follow tomorrow morning after completion of study.    [facility] EEG Reading Room Ph#: [phone]  Epilepsy Answering Service after 5PM and before 8:30AM: Ph#: [phone]

## 2024-09-23 NOTE — PROGRESS NOTE ADULT - SUBJECTIVE AND OBJECTIVE BOX
Progress Note   Miriam Alexandra PGY4- GI/Hep    SUBJECTIVE: Patient seen and examined at bedside.   - Extubated this AM     OBJECTIVE:    VITAL SIGNS:  ICU Vital Signs Last 24 Hrs  T(C): 37.7 (23 Sep 2024 08:00), Max: 38.2 (23 Sep 2024 04:00)  T(F): 99.9 (23 Sep 2024 08:00), Max: 100.8 (23 Sep 2024 04:00)  HR: 83 (23 Sep 2024 10:00) (82 - 93)  BP: 126/62 (23 Sep 2024 10:00) (89/53 - 165/70)  BP(mean): 88 (23 Sep 2024 10:00) (69 - 102)  ABP: 122/58 (22 Sep 2024 19:00) (116/62 - 161/82)  ABP(mean): 83 (22 Sep 2024 19:00) (83 - 115)  RR: 18 (23 Sep 2024 10:00) (14 - 24)  SpO2: 100% (23 Sep 2024 10:00) (100% - 100%)    O2 Parameters below as of 23 Sep 2024 09:45      O2 Concentration (%): 30      Mode: CPAP with PS, FiO2: 21, PEEP: 5, PS: 0, MAP: 7    09-22 @ 07:01 - 09-23 @ 07:00  --------------------------------------------------------  IN: 5790.8 mL / OUT: 2000 mL / NET: 3790.8 mL    09-23 @ 07:01  -  09-23 @ 10:55  --------------------------------------------------------  IN: 124.2 mL / OUT: 0 mL / NET: 124.2 mL        PHYSICAL EXAM:  General: NAD  HEENT: NC/AT  Neck: supple  Respiratory: Regular RR, no increase in WOB  Cardiovascular: RRR  Abdomen: soft, NT/ND; +BS x4  Extremities: WWP, 2+ peripheral pulses b/l  Skin: normal color and turgor; no rash  Neurological: Alert     MEDICATIONS:  MEDICATIONS  (STANDING):  chlorhexidine 4% Liquid 1 Application(s) Topical <User Schedule>  folic acid Injectable 1 milliGRAM(s) IV Push daily  heparin   Injectable 5000 Unit(s) SubCutaneous every 8 hours  labetalol 200 milliGRAM(s) Oral three times a day  lactulose Syrup 30 Gram(s) Oral every 6 hours  mupirocin 2% Nasal 1 Application(s) Both Nostrils two times a day  pantoprazole  Injectable 40 milliGRAM(s) IV Push two times a day  piperacillin/tazobactam IVPB.. 3.375 Gram(s) IV Intermittent every 8 hours  rifAXIMin 550 milliGRAM(s) Oral two times a day  thiamine 100 milliGRAM(s) Oral daily    MEDICATIONS  (PRN):  fentaNYL    Injectable 50 MICROGram(s) IV Push every 4 hours PRN Agitation      ALLERGIES:  Allergies    No Known Allergies    Intolerances        LABS:                        6.4    8.90  )-----------( 103      ( 23 Sep 2024 00:13 )             21.2     09-23    155[H]  |  126[H]  |  28[H]  ----------------------------<  131[H]  3.7   |  20[L]  |  1.27    Ca    8.5      23 Sep 2024 00:14  Phos  2.3     09-23  Mg     2.8     09-23    TPro  5.3[L]  /  Alb  2.4[L]  /  TBili  7.2[H]  /  DBili  x   /  AST  79[H]  /  ALT  133[H]  /  AlkPhos  107  09-23    PT/INR - ( 23 Sep 2024 00:13 )   PT: 19.9 sec;   INR: 1.93 ratio         PTT - ( 23 Sep 2024 00:13 )  PTT:40.3 sec  Urinalysis Basic - ( 23 Sep 2024 00:14 )    Color: x / Appearance: x / SG: x / pH: x  Gluc: 131 mg/dL / Ketone: x  / Bili: x / Urobili: x   Blood: x / Protein: x / Nitrite: x   Leuk Esterase: x / RBC: x / WBC x   Sq Epi: x / Non Sq Epi: x / Bacteria: x         Progress Note   Miriam Alexandra PGY4- GI/Hep    SUBJECTIVE: Patient seen and examined at bedside.   - Extubated this AM   - More awake and following some commands (closes eyes)    OBJECTIVE:    VITAL SIGNS:  ICU Vital Signs Last 24 Hrs  T(C): 37.7 (23 Sep 2024 08:00), Max: 38.2 (23 Sep 2024 04:00)  T(F): 99.9 (23 Sep 2024 08:00), Max: 100.8 (23 Sep 2024 04:00)  HR: 83 (23 Sep 2024 10:00) (82 - 93)  BP: 126/62 (23 Sep 2024 10:00) (89/53 - 165/70)  BP(mean): 88 (23 Sep 2024 10:00) (69 - 102)  ABP: 122/58 (22 Sep 2024 19:00) (116/62 - 161/82)  ABP(mean): 83 (22 Sep 2024 19:00) (83 - 115)  RR: 18 (23 Sep 2024 10:00) (14 - 24)  SpO2: 100% (23 Sep 2024 10:00) (100% - 100%)    O2 Parameters below as of 23 Sep 2024 09:45      O2 Concentration (%): 30      Mode: CPAP with PS, FiO2: 21, PEEP: 5, PS: 0, MAP: 7    09-22 @ 07:01 - 09-23 @ 07:00  --------------------------------------------------------  IN: 5790.8 mL / OUT: 2000 mL / NET: 3790.8 mL    09-23 @ 07:01 - 09-23 @ 10:55  --------------------------------------------------------  IN: 124.2 mL / OUT: 0 mL / NET: 124.2 mL        PHYSICAL EXAM:  General: NAD  HEENT: NC/AT  Neck: supple  Respiratory: Regular RR, no increase in WOB  Cardiovascular: RRR  Abdomen: soft, NT/distended; +BS x4  Extremities: WWP, 2+ peripheral pulses b/l, UE edema   Skin: jaundiced; no rash  Neurological: Alert but not answering questions appropriately, shouting; closes eyes when asked to. Does not withdraw to pain in LE or UE    MEDICATIONS:  MEDICATIONS  (STANDING):  chlorhexidine 4% Liquid 1 Application(s) Topical <User Schedule>  folic acid Injectable 1 milliGRAM(s) IV Push daily  heparin   Injectable 5000 Unit(s) SubCutaneous every 8 hours  labetalol 200 milliGRAM(s) Oral three times a day  lactulose Syrup 30 Gram(s) Oral every 6 hours  mupirocin 2% Nasal 1 Application(s) Both Nostrils two times a day  pantoprazole  Injectable 40 milliGRAM(s) IV Push two times a day  piperacillin/tazobactam IVPB.. 3.375 Gram(s) IV Intermittent every 8 hours  rifAXIMin 550 milliGRAM(s) Oral two times a day  thiamine 100 milliGRAM(s) Oral daily    MEDICATIONS  (PRN):  fentaNYL    Injectable 50 MICROGram(s) IV Push every 4 hours PRN Agitation      ALLERGIES:  Allergies    No Known Allergies    Intolerances        LABS:                        6.4    8.90  )-----------( 103      ( 23 Sep 2024 00:13 )             21.2     09-23    155[H]  |  126[H]  |  28[H]  ----------------------------<  131[H]  3.7   |  20[L]  |  1.27    Ca    8.5      23 Sep 2024 00:14  Phos  2.3     09-23  Mg     2.8     09-23    TPro  5.3[L]  /  Alb  2.4[L]  /  TBili  7.2[H]  /  DBili  x   /  AST  79[H]  /  ALT  133[H]  /  AlkPhos  107  09-23    PT/INR - ( 23 Sep 2024 00:13 )   PT: 19.9 sec;   INR: 1.93 ratio         PTT - ( 23 Sep 2024 00:13 )  PTT:40.3 sec  Urinalysis Basic - ( 23 Sep 2024 00:14 )    Color: x / Appearance: x / SG: x / pH: x  Gluc: 131 mg/dL / Ketone: x  / Bili: x / Urobili: x   Blood: x / Protein: x / Nitrite: x   Leuk Esterase: x / RBC: x / WBC x   Sq Epi: x / Non Sq Epi: x / Bacteria: x

## 2024-09-23 NOTE — PROGRESS NOTE ADULT - ASSESSMENT
Encephalopathy  EtOH use disorder  Acute cholecystitis and liver failure  HTN  Pancytopenia  Hyperammonemia  Cerebral edema  Hypernatremia  Weakness    - Etiology for diffuse cerebral edema with above exam is most likely secondary to severe hyperammonemia/hepatic encephalopathy. However, cannot completely exclude other causes such as cerebrovascular, infectious, inflammatory, or other toxic/metabolic. CT head, personally reviewed by me, with diffuse sulcal effacement and blurring of the gray-white junction most consistent with cerebral edema but no hemorrhage. Abnormal movements are posturing and NOT seizures. 9/18 - Hypertonic saline given and being transitioned to PLEX. CTA head/neck and CTV head, personally reviewed by me, largely unrevealing with likely artifact on CTV head (even if non-occlusive venous thrombus found would not be contributing to current clinical picture). EEG with diffuse suppression likely indicating cortical dysfunction and possible death; although cannot exclude lingering effect of sedation this is much less likely. 9/19 - Lab markers slowly improving but clinically unchanged. MRI brain, personally reviewed by me, with mild T2/FLAIR hyperintensity thoughout the entire cortex as well as mild to moderate sulcal effacement laterally likely consistent with cerebral edema but no blurring of the gray-white junction or other acute intracranial findings. 9/23 - Extubated and slowly improved. Diffusely weak, unclear if related to critical illness neuropathy/myopathy or other etiology. Will continue to monitor  - CTA head/neck w/, CTV head w/o with results as above  - MRI brain w/ and w/o with results as above  - vEEG with diffuse suppression and slowing with evidence for focal slowing, epileptiform discharges, or seizures. STOPPED. Recommend new vEEG  - Optimize sedation with minimizing propofol, benzodiazepines, or barbiturates (Precedex, fentanyl, or hydromorphone OK)  - Await labs for additional causes with Vitamin D 25 OH, B6  - Above recommendations discussed with MICU team, who verbalized agreement and understanding  - Continue to address above medical problems, as you are doing  - Will continue to follow patient with you

## 2024-09-23 NOTE — PROGRESS NOTE ADULT - ASSESSMENT
59 years old male with h/o chronic ETOH abuse and dependence (1 bottle of Vodka a day) with long standing hx of alcohol withdrawal and DTs with frequent hospital/ICU admissions, alcoholic gastritis/esophagitis, PUD, HLD, hx of hypoxic respiratory failure requiring intubation due to aspiration PNA (most recent intubation Aril 2020),  staph PNA, COVID-19 infection Dec 2020 presented with abd pain at the OSH.    #Decompensated alcoholic cirrhosis  #GUNJAN  - Calculated MELD3 score on 9/16 34--->9/23 25  - Presented with AMS at OSH. Had multiple hospitalizations in the past for alcohol withdrawal and DT per records. Unclear last alcohol drink. His alcohol level this admission was <10 and Tylenol level was 42 on admission. Intubated with findings of cerebral edema and concern for GUNJAN. Now extubated on 9/23 with improvement in mental status s/p Plexx3, mannitol and hypertonic saline   - Could be due to Tylenol use in the setting of underlying liver disease causing acute elevation in his liver enzymes. However, could also be due to ischemia as he was hypotensive at OSH, concerned for possible sepsis. GB distended concerning for acute cholecystitis, no other sources of infection.  - Other lab serologies: CMV, EBV PCR neg, Hep B/C neg.     Recommendations:   - Transfuse to keep hbg >7  - ammonia, CMP, phos, INR BID vs daily   - Aggressive electrolyte repletion   - Keep MAP >65   - Continue with NAC drip  - Continue with lactulose enema, goal 3-4 BMs per day.   - Patient is unlikely a candidate for LT due to multiple admissions in the past with alcohol withdrawal.     Miriam Cifuentes MD  Gastroenterology/Hepatology Fellow, PGY-5  Please contact via TEAMS    NON-URGENT CONSULTS:  Please email [email or web address]  [email or web address]   59 years old male with h/o chronic ETOH abuse and dependence (1 bottle of Vodka a day) with long standing hx of alcohol withdrawal and DTs with frequent hospital/ICU admissions, alcoholic gastritis/esophagitis, PUD, HLD, hx of hypoxic respiratory failure requiring intubation due to aspiration PNA (most recent intubation Aril 2020),  staph PNA, COVID-19 infection Dec 2020 presented with abd pain at the OSH.    #Decompensated alcoholic cirrhosis  #GUNJAN  - Calculated MELD3 score on 9/16 34--->9/23 25  - Presented with AMS at OSH. Had multiple hospitalizations in the past for alcohol withdrawal and DT per records. Unclear last alcohol drink. His alcohol level this admission was <10 and Tylenol level was 42 on admission. Intubated with findings of cerebral edema and concern for GUNJAN. Now extubated on 9/23 with improvement in mental status s/p Plexx3, mannitol and hypertonic saline   - Could be due to Tylenol use in the setting of underlying liver disease causing acute elevation in his liver enzymes. However, could also be due to ischemia as he was hypotensive at OSH, concerned for possible sepsis. GB distended concerning for acute cholecystitis, no other sources of infection.  - Other lab serologies: CMV, EBV PCR neg, Hep B/C neg.     Recommendations:   - Transfuse to keep hbg >7  - ammonia, CMP, phos, INR BID vs daily   - Aggressive electrolyte repletion   - Keep MAP >65   - Consider spinal imaging if head imaging normal and patient still not withdrawing to pain   - Supplement B12, thiamine and folate   - Continue with lactulose enema, goal 3-4 BMs per day.   - Patient is unlikely a candidate for LT due to multiple admissions in the past with alcohol withdrawal.     Miriam Cifuentes MD  Gastroenterology/Hepatology Fellow, PGY-5  Please contact via TEAMS    NON-URGENT CONSULTS:  Please email [email or web address]  [email or web address]

## 2024-09-24 LAB
A1AT PHENOTYP SERPL-IMP: SIGNIFICANT CHANGE UP
A1AT SERPL-MCNC: 99 MG/DL — LOW (ref 101–187)
ALBUMIN SERPL ELPH-MCNC: 2.4 G/DL — LOW (ref 3.3–5)
ALP SERPL-CCNC: 106 U/L — SIGNIFICANT CHANGE UP (ref 40–120)
ALT FLD-CCNC: 137 U/L — HIGH (ref 10–45)
AMMONIA BLD-MCNC: 42 UMOL/L — SIGNIFICANT CHANGE UP (ref 11–55)
ANION GAP SERPL CALC-SCNC: 9 MMOL/L — SIGNIFICANT CHANGE UP (ref 5–17)
APTT BLD: 49.3 SEC — HIGH (ref 24.5–35.6)
AST SERPL-CCNC: 94 U/L — HIGH (ref 10–40)
BASOPHILS # BLD AUTO: 0.04 K/UL — SIGNIFICANT CHANGE UP (ref 0–0.2)
BASOPHILS NFR BLD AUTO: 0.4 % — SIGNIFICANT CHANGE UP (ref 0–2)
BILIRUB SERPL-MCNC: 9 MG/DL — HIGH (ref 0.2–1.2)
BUN SERPL-MCNC: 28 MG/DL — HIGH (ref 7–23)
CALCIUM SERPL-MCNC: 8.4 MG/DL — SIGNIFICANT CHANGE UP (ref 8.4–10.5)
CHLORIDE SERPL-SCNC: 126 MMOL/L — HIGH (ref 96–108)
CO2 SERPL-SCNC: 20 MMOL/L — LOW (ref 22–31)
CREAT SERPL-MCNC: 1.38 MG/DL — HIGH (ref 0.5–1.3)
CULTURE RESULTS: SIGNIFICANT CHANGE UP
CULTURE RESULTS: SIGNIFICANT CHANGE UP
D DIMER BLD IA.RAPID-MCNC: 3023 NG/ML DDU — HIGH
EGFR: 60 ML/MIN/1.73M2 — SIGNIFICANT CHANGE UP
EOSINOPHIL # BLD AUTO: 0.39 K/UL — SIGNIFICANT CHANGE UP (ref 0–0.5)
EOSINOPHIL NFR BLD AUTO: 3.9 % — SIGNIFICANT CHANGE UP (ref 0–6)
FIBRINOGEN PPP-MCNC: 195 MG/DL — LOW (ref 200–445)
GAS PNL BLDA: SIGNIFICANT CHANGE UP
GAS PNL BLDV: SIGNIFICANT CHANGE UP
GLUCOSE SERPL-MCNC: 114 MG/DL — HIGH (ref 70–99)
HCT VFR BLD CALC: 22 % — LOW (ref 39–50)
HGB BLD-MCNC: 7.1 G/DL — LOW (ref 13–17)
IMM GRANULOCYTES NFR BLD AUTO: 1.2 % — HIGH (ref 0–0.9)
INR BLD: 1.49 RATIO — HIGH (ref 0.85–1.18)
LDH SERPL L TO P-CCNC: 265 U/L — HIGH (ref 50–242)
LYMPHOCYTES # BLD AUTO: 2 K/UL — SIGNIFICANT CHANGE UP (ref 1–3.3)
LYMPHOCYTES # BLD AUTO: 20 % — SIGNIFICANT CHANGE UP (ref 13–44)
MAGNESIUM SERPL-MCNC: 2.6 MG/DL — SIGNIFICANT CHANGE UP (ref 1.6–2.6)
MCHC RBC-ENTMCNC: 25.5 PG — LOW (ref 27–34)
MCHC RBC-ENTMCNC: 32.3 GM/DL — SIGNIFICANT CHANGE UP (ref 32–36)
MCV RBC AUTO: 79.1 FL — LOW (ref 80–100)
MONOCYTES # BLD AUTO: 1.16 K/UL — HIGH (ref 0–0.9)
MONOCYTES NFR BLD AUTO: 11.6 % — SIGNIFICANT CHANGE UP (ref 2–14)
NEUTROPHILS # BLD AUTO: 6.29 K/UL — SIGNIFICANT CHANGE UP (ref 1.8–7.4)
NEUTROPHILS NFR BLD AUTO: 62.9 % — SIGNIFICANT CHANGE UP (ref 43–77)
NRBC # BLD: 0 /100 WBCS — SIGNIFICANT CHANGE UP (ref 0–0)
PHOSPHATE SERPL-MCNC: 3 MG/DL — SIGNIFICANT CHANGE UP (ref 2.5–4.5)
PLATELET # BLD AUTO: 137 K/UL — LOW (ref 150–400)
POTASSIUM SERPL-MCNC: 3.6 MMOL/L — SIGNIFICANT CHANGE UP (ref 3.5–5.3)
POTASSIUM SERPL-SCNC: 3.6 MMOL/L — SIGNIFICANT CHANGE UP (ref 3.5–5.3)
PROT SERPL-MCNC: 5.7 G/DL — LOW (ref 6–8.3)
PROTHROM AB SERPL-ACNC: 16.2 SEC — HIGH (ref 9.5–13)
RBC # BLD: 2.78 M/UL — LOW (ref 4.2–5.8)
RBC # FLD: 23.5 % — HIGH (ref 10.3–14.5)
SODIUM SERPL-SCNC: 155 MMOL/L — HIGH (ref 135–145)
SPECIMEN SOURCE: SIGNIFICANT CHANGE UP
SPECIMEN SOURCE: SIGNIFICANT CHANGE UP
WBC # BLD: 10 K/UL — SIGNIFICANT CHANGE UP (ref 3.8–10.5)
WBC # FLD AUTO: 10 K/UL — SIGNIFICANT CHANGE UP (ref 3.8–10.5)

## 2024-09-24 PROCEDURE — 99291 CRITICAL CARE FIRST HOUR: CPT

## 2024-09-24 PROCEDURE — 93010 ELECTROCARDIOGRAM REPORT: CPT

## 2024-09-24 PROCEDURE — 99291 CRITICAL CARE FIRST HOUR: CPT | Mod: GC

## 2024-09-24 PROCEDURE — 93971 EXTREMITY STUDY: CPT | Mod: 26,RT

## 2024-09-24 PROCEDURE — 95813 EEG EXTND MNTR 61-119 MIN: CPT | Mod: 26

## 2024-09-24 RX ORDER — DEXMEDETOMIDINE HYDROCHLORIDE IN 0.9% SODIUM CHLORIDE 400 UG/100ML
0.2 INJECTION INTRAVENOUS
Qty: 200 | Refills: 0 | Status: DISCONTINUED | OUTPATIENT
Start: 2024-09-24 | End: 2024-09-24

## 2024-09-24 RX ORDER — THIAMINE HYDROCHLORIDE 100 MG/ML
100 INJECTION, SOLUTION INTRAMUSCULAR; INTRAVENOUS DAILY
Refills: 0 | Status: DISCONTINUED | OUTPATIENT
Start: 2024-09-24 | End: 2024-09-28

## 2024-09-24 RX ORDER — QUETIAPINE FUMARATE 50 MG/1
50 TABLET, FILM COATED ORAL AT BEDTIME
Refills: 0 | Status: DISCONTINUED | OUTPATIENT
Start: 2024-09-24 | End: 2024-09-24

## 2024-09-24 RX ORDER — QUETIAPINE FUMARATE 50 MG/1
50 TABLET, FILM COATED ORAL ONCE
Refills: 0 | Status: COMPLETED | OUTPATIENT
Start: 2024-09-24 | End: 2024-09-24

## 2024-09-24 RX ORDER — QUETIAPINE FUMARATE 50 MG/1
50 TABLET, FILM COATED ORAL AT BEDTIME
Refills: 0 | Status: DISCONTINUED | OUTPATIENT
Start: 2024-09-24 | End: 2024-09-25

## 2024-09-24 RX ORDER — QUETIAPINE FUMARATE 50 MG/1
25 TABLET, FILM COATED ORAL DAILY
Refills: 0 | Status: DISCONTINUED | OUTPATIENT
Start: 2024-09-25 | End: 2024-09-25

## 2024-09-24 RX ORDER — LABETALOL HYDROCHLORIDE 200 MG/1
100 TABLET, FILM COATED ORAL THREE TIMES A DAY
Refills: 0 | Status: DISCONTINUED | OUTPATIENT
Start: 2024-09-24 | End: 2024-09-26

## 2024-09-24 RX ORDER — FUROSEMIDE 10 MG/ML
40 INJECTION INTRAVENOUS ONCE
Refills: 0 | Status: COMPLETED | OUTPATIENT
Start: 2024-09-24 | End: 2024-09-24

## 2024-09-24 RX ORDER — LACTULOSE 10 G/15ML
30 SOLUTION ORAL; RECTAL EVERY 6 HOURS
Refills: 0 | Status: DISCONTINUED | OUTPATIENT
Start: 2024-09-24 | End: 2024-09-26

## 2024-09-24 RX ADMIN — LACTULOSE 30 GRAM(S): 10 SOLUTION ORAL; RECTAL at 13:05

## 2024-09-24 RX ADMIN — LABETALOL HYDROCHLORIDE 100 MILLIGRAM(S): 200 TABLET, FILM COATED ORAL at 21:42

## 2024-09-24 RX ADMIN — Medication 2 MILLIGRAM(S): at 21:52

## 2024-09-24 RX ADMIN — Medication 5000 UNIT(S): at 13:06

## 2024-09-24 RX ADMIN — Medication 5000 UNIT(S): at 06:17

## 2024-09-24 RX ADMIN — DEXMEDETOMIDINE HYDROCHLORIDE IN 0.9% SODIUM CHLORIDE 4.15 MICROGRAM(S)/KG/HR: 400 INJECTION INTRAVENOUS at 06:18

## 2024-09-24 RX ADMIN — MUPIROCIN 1 APPLICATION(S): 20 OINTMENT TOPICAL at 17:01

## 2024-09-24 RX ADMIN — THIAMINE HYDROCHLORIDE 100 MILLIGRAM(S): 100 INJECTION, SOLUTION INTRAMUSCULAR; INTRAVENOUS at 11:37

## 2024-09-24 RX ADMIN — FOLIC ACID 1 MILLIGRAM(S): 1 TABLET ORAL at 11:37

## 2024-09-24 RX ADMIN — LACTULOSE 30 GRAM(S): 10 SOLUTION ORAL; RECTAL at 02:18

## 2024-09-24 RX ADMIN — QUETIAPINE FUMARATE 50 MILLIGRAM(S): 50 TABLET, FILM COATED ORAL at 21:42

## 2024-09-24 RX ADMIN — Medication 5000 UNIT(S): at 21:42

## 2024-09-24 RX ADMIN — PANTOPRAZOLE SODIUM 40 MILLIGRAM(S): 40 TABLET, DELAYED RELEASE ORAL at 06:17

## 2024-09-24 RX ADMIN — QUETIAPINE FUMARATE 50 MILLIGRAM(S): 50 TABLET, FILM COATED ORAL at 11:10

## 2024-09-24 RX ADMIN — CHLORHEXIDINE GLUCONATE ORAL RINSE 1 APPLICATION(S): 1.2 SOLUTION DENTAL at 06:19

## 2024-09-24 RX ADMIN — Medication 40 MILLIEQUIVALENT(S): at 10:09

## 2024-09-24 RX ADMIN — LABETALOL HYDROCHLORIDE 200 MILLIGRAM(S): 200 TABLET, FILM COATED ORAL at 06:17

## 2024-09-24 RX ADMIN — PANTOPRAZOLE SODIUM 40 MILLIGRAM(S): 40 TABLET, DELAYED RELEASE ORAL at 17:01

## 2024-09-24 RX ADMIN — PIPERACILLIN SODIUM AND TAZOBACTAM SODIUM 25 GRAM(S): 12; 1.5 INJECTION, POWDER, LYOPHILIZED, FOR SOLUTION INTRAVENOUS at 13:06

## 2024-09-24 RX ADMIN — PIPERACILLIN SODIUM AND TAZOBACTAM SODIUM 25 GRAM(S): 12; 1.5 INJECTION, POWDER, LYOPHILIZED, FOR SOLUTION INTRAVENOUS at 21:43

## 2024-09-24 RX ADMIN — MUPIROCIN 1 APPLICATION(S): 20 OINTMENT TOPICAL at 06:19

## 2024-09-24 RX ADMIN — PIPERACILLIN SODIUM AND TAZOBACTAM SODIUM 25 GRAM(S): 12; 1.5 INJECTION, POWDER, LYOPHILIZED, FOR SOLUTION INTRAVENOUS at 06:18

## 2024-09-24 RX ADMIN — FUROSEMIDE 40 MILLIGRAM(S): 10 INJECTION INTRAVENOUS at 10:09

## 2024-09-24 NOTE — PHARMACOTHERAPY INTERVENTION NOTE - COMMENTS
VANDANA VILLA, 59y Male was extubated yesterday. Overnight, started on dexmedetomidine due to agitation.    MEDICATIONS  (STANDING):  folic acid Injectable 1 milliGRAM(s) IV Push daily  heparin   Injectable 5000 Unit(s) SubCutaneous every 8 hours  labetalol 100 milliGRAM(s) Oral three times a day  lactulose Syrup 30 Gram(s) Oral every 6 hours  mupirocin 2% Nasal 1 Application(s) Both Nostrils two times a day  pantoprazole  Injectable 40 milliGRAM(s) IV Push two times a day  piperacillin/tazobactam IVPB.. 3.375 Gram(s) IV Intermittent every 8 hours  QUEtiapine 50 milliGRAM(s) Oral at bedtime  rifAXIMin 550 milliGRAM(s) Oral two times a day  thiamine 100 milliGRAM(s) Oral daily    -The team decreased the labetalol dose from 200mg to 100mg tid (BP is sofe)       Recommendation(s):  1) Consider adding quetiapine 50mg at bedtime      Mikala Guan, PharmD, BCCCP  Clinical Pharmacist, Critical Care  Available via [employer] Teams

## 2024-09-24 NOTE — CHART NOTE - NSCHARTNOTEFT_GEN_A_CORE
NUTRITION FOLLOW UP NOTE    PATIENT SEEN FOR: follow up on MICU.    SOURCE: [] Patient  [x] Current Medical Record  [x] RN  [] Family/support person at bedside  [x] Patient unavailable/inappropriate  [x] Other: Interdisciplinary Rounds     CHART REVIEWED/EVENTS NOTED.  [] No changes to nutrition care plan to note  [x] Nutrition Status:  - ETOH abuse + Acute on chronic decompensated liver failure  -> s/p PLEX    DIET ORDER:   Diet, NPO with Tube Feed:   Tube Feeding Modality: Orogastric  Glucerna 1.5 Lester (GLUCERNA1.5RTH)  Total Volume for 24 Hours (mL): 1170  Continuous  Starting Tube Feed Rate {mL per Hour}: 10  Increase Tube Feed Rate by (mL): 10     Every 4 hours  Until Goal Tube Feed Rate (mL per Hour): 65  Tube Feed Duration (in Hours): 18  Tube Feed Start Time: 11:00  Tube Feed Stop Time: 06:00 (24 @ 09:31)  Free water: 350 mL q 6 hours     CURRENT DIET ORDER IS:  [] Appropriate:  [] Inadequate:  [x] Other: See recommendations below    NUTRITION INTAKE/PROVISION:  [] PO:  [x] Enteral Nutrition:  Order Provides: 1170 ml formula, 1755 kcals, 97 gm protein, and 888 mL free water.   Current Pump Rate: 55 mL/hr  5-Day enteral average per RN flowsheet: 1042 mL, 1563 kcals, 86 gm protein   [] Parenteral Nutrition:    ANTHROPOMETRICS:  Drug Dosing Weight  Height (cm): 182.9 (15 Sep 2024 07:00); Ht per HIE: 170.18 cm  Weight (kg): 83 (15 Sep 2024 08:29)  BMI (kg/m2): 24.8 (15 Sep 2024 08:29); based on HIE ht: 28.6     Weights:   Daily Weight in k.1 (), 88.4 (), 82.5 ()   No new wts to address. Pt moving around in bed; RD unable to obtain new wt  Drastic wt fluctuations noted and possibly fluid shifts vs inaccurate bed scale  RD will continue to trend as new wts available/able.     NUTRITIONALLY PERTINENT MEDICATIONS:  MEDICATIONS  (STANDING):  folic acid Injectable 1 milliGRAM(s) IV Push daily  heparin   Injectable 5000 Unit(s) SubCutaneous every 8 hours  labetalol 100 milliGRAM(s) Oral three times a day  lactulose Syrup 30 Gram(s) Oral every 6 hours  pantoprazole  Injectable 40 milliGRAM(s) IV Push two times a day  piperacillin/tazobactam IVPB.. 3.375 Gram(s) IV Intermittent every 8 hours  QUEtiapine 50 milliGRAM(s) Oral once  QUEtiapine 50 milliGRAM(s) Oral at bedtime  rifAXIMin 550 milliGRAM(s) Oral two times a day  thiamine 100 milliGRAM(s) Oral daily    NUTRITIONALLY PERTINENT LABS:   Na155 mmol/L[H] Glu 114 mg/dL[H] K+ 3.6 mmol/L Cr  1.38 mg/dL[H] BUN 28 mg/dL[H]    Phos 3.0 mg/dL    Alb 2.4 g/dL[L]    U/L[H] AST 94 U/L[H] Alkaline Phosphatase 106 U/L  24 @ 11:30 a1c 5.5    A1C with Estimated Average Glucose Result: 5.5 % (24 @ 11:30)  A1C with Estimated Average Glucose Result: 5.4 % (24 @ 07:05)    NUTRITIONALLY PERTINENT MEDICATIONS/LABS:  [x] Reviewed  [x] Relevant notes on medications/labs:  - On antibiotics   - Ordered for folic acid and thiamine for Hx etoh use  - Phos intermittingly low; last drawn WNL   - Hypernatremia; ordered for free water    EDEMA:  [x] Reviewed  [x] Relevant notes: per RN flowsheet: 2+ generalized 3+ left arm; right arm; left wrist; right wrist; left hand; right hand    GI/ I&O:  [x] Reviewed  [] Relevant notes:  [x] Other: Large liquid brown BM 9/24x3 (on lactulose)    SKIN:   [x] No pressure injuries documented, per nursing flowsheet  [] Pressure injury previously noted  [] Change in pressure injury documentation:  [] Other:    ESTIMATED NEEDS:  Based on dosing wt 83 kg  Energy: (23-28 kcals/kg) 2424-0506 kcal/day   Protein: (1.2-1.4 g/kg) 100-116 g/day  Fluid needs deferred to provider    NUTRITION DIAGNOSIS:  [x] Prior Dx: 1) Increased protein-energy needs  2) Inadequate protein-energy intake  [] New Dx:     EDUCATION:  [] Yes:  [x] Not appropriate/warranted    NUTRITION CARE PLAN:  1. Diet: Consider no therapeutic diet restrictions when advanced. Consistency deferred to speech-language pathology and provider.   2. Multivitamin/mineral supplementation: As medically feasible, continue to provide thiamine and folic acid + add multivitamin as able.     [] Achieved - Continue current nutrition intervention(s)  [] Current medical condition precludes nutrition intervention at this time.    MONITORING AND EVALUATION:   RD remains available upon request and will follow up per protocol.    Sanjana Davalos, MS, RD, CDN, CNSC, CDCES TEAMS

## 2024-09-24 NOTE — PROGRESS NOTE ADULT - ASSESSMENT
Patient is a 59 year old M with PMHx ETOH abuse with frequent admissions for withdrawal & gastritis admitted to ICU for acute on chronic decompensated liver failure & alcohol withdrawal. Patient now intubated for airway protection. Started on PLEX for Acute liver failure. Pending further evaluation regarding AMS.    # Neuro:  Acute Metabolic Encephalopathy   -Likely secondary to elevated ammonia level along with liver failure.   -CT head showing concerns for cerebral edema or global hypoxic injury  -CTA and CTV- appear to have no major findings  -MRI did not show any acute issues  -vEEG- no major findings  - Ammonia improving     Plan - still encephopathic   - OFF sedation   -Neurology on board  -S/P PLEX- got 1st round 9/17 and 2nd 9/19, 3rd round 9/20, f/u if need for any additional PLEX   -Continue Lactulose and Miralax- ammonia still elevated  -c/w Rifaximin  -Awake and alert, hold off additional vEEG     #Resp:   -Patient intubated for airway protection  - Extubated 9/23    #CV:  -Patient not on any vasopressors at this time. Hemodynamically stable.   - TTE pending     HTN  -On PO Labetalol 200mg TID    #GI:  // Acute Decompensated Liver Failure - patient with long history of etoh abuse with evidence of hepatic steatosis on imaging.  Appears to be related to a prior ischemic episode   - RUQ US: Distended gallbladder with wall thickening up to 5 mm and trace pericholecystic fluid without evidence of cholelithiasis or biliary dilatation. Negative sonographic Gibson's sign.   - CTAP: Nondistended gallbladder with diffuse nonspecific wall edema. Severe fatty liver.  - labs significant for thrombocytopenia, transaminitis, anemia, elevated bilirubin & decreased fibrinogen, elevated lactate.   Plan  -Continue Thiamine and Folate  - s/p NAC, currently off   -S/P PLEX as above   -Hepatology consulted- ordered Lab work recommended by them   -Continue Lactulose and Miralax  -Continue Rifaxamin    #Diet  -Started on tube feeds     #Renal:  -No acute issues. Will monitor Cr and urine output.   Hypervolemic state  - Start Diuresis 40 lasix once 9/23, appears hypervolemic on exam   #hypernatremia   - 350 q6 free water flushes, stable at 155     #ID:  - s/p CTX, Fever 100.9   -Continue Zosyn ( 9/23- )   -Will stop Vanc    #Heme:  Concern for DIC   - elevated coags, d-dimer & fibrinogen 40  - S/P 3u cryo & 1 FFP in setting of DIC and liver failure-  - FU CBC. tranfuse for Hg <7   - Fibrinogen now improved  Plan  -Will continue to monitor    #Elevated INR  -INR now improved to 1.84  Plan  -Will monitor    #anemia - chronic disease- no active bleeding   - total prbcs 1u - 9/23    #DVT ppx  -Started Heparin 5K tid    #Endo:  - cont to monitor FS Q6H     Lines  -Tristian placed 9/17- need to be removed 9/24  -Cindy placed 9/18- will remove today      Patient is a 59 year old M with PMHx ETOH abuse with frequent admissions for withdrawal & gastritis admitted to ICU for acute on chronic decompensated liver failure & alcohol withdrawal. Patient now intubated for airway protection. Started on PLEX for Acute liver failure. Pending further evaluation regarding AMS.    # Neuro:  Acute Metabolic Encephalopathy   -Likely secondary to elevated ammonia level along with liver failure.   -CT head showing concerns for cerebral edema or global hypoxic injury  -CTA and CTV- appear to have no major findings  -MRI did not show any acute issues  -vEEG- no major findings, repeat 9/23 no seizures  - Ammonia improving     Plan - still encephopathic   - OFF sedation   -Neurology on board  -S/P PLEX- got 1st round 9/17 and 2nd 9/19, 3rd round 9/20  -Continue Lactulose and Miralax- ammonia improving   -c/w Rifaximin    #Resp:   -Patient intubated for airway protection  - Extubated 9/23    #CV:  -Patient not on any vasopressors at this time. Hemodynamically stable.   - TTE 9/2023 - EF 65%     HTN  -On PO Labetalol 200mg TID    #GI:  // Acute Decompensated Liver Failure - patient with long history of etoh abuse with evidence of hepatic steatosis on imaging.  Appears to be related to a prior ischemic episode   - RUQ US: Distended gallbladder with wall thickening up to 5 mm and trace pericholecystic fluid without evidence of cholelithiasis or biliary dilatation. Negative sonographic Gibson's sign.   - CTAP: Nondistended gallbladder with diffuse nonspecific wall edema. Severe fatty liver.  - labs significant for thrombocytopenia, transaminitis, anemia, elevated bilirubin & decreased fibrinogen, elevated lactate.   Plan  -Continue Thiamine and Folate  - s/p NAC, currently off   -S/P PLEX as above   -Hepatology consulted- ordered Lab work recommended by them   -Continue Lactulose and Miralax  -Continue Rifaxamin    #Diet  -Started on tube feeds     #Renal:  -No acute issues. Will monitor Cr and urine output.   Hypervolemic state  - Start Diuresis 40 lasix once 9/23, appears hypervolemic on exam   #hypernatremia   - 350 q6 free water flushes, stable at 155     #ID:  - s/p CTX, Fever 100.9   -Continue Zosyn ( 9/23- )   -Will stop Vanc    #Heme:  Concern for DIC - stable   - elevated coags, d-dimer & fibrinogen 40  - S/P 3u cryo & 1 FFP in setting of DIC and liver failure-  - FU CBC. tranfuse for Hg <7   - Fibrinogen now improved  Plan  -Will continue to monitor    #Elevated INR  -INR now improved to 1.84  Plan  -Will monitor    #anemia - chronic disease- no active bleeding   - total prbcs count 1u - 9/23    #DVT ppx  -Heparin 5K tid    #Endo:  - cont to monitor FS Q6H     Lines  -Shiley placed 9/17- need to be removed 9/24  -Cindy placed 9/18- will remove today      Patient is a 59 year old M with PMHx ETOH abuse with frequent admissions for withdrawal & gastritis admitted to ICU for acute on chronic decompensated liver failure & alcohol withdrawal. Patient now intubated for airway protection. Started on PLEX for Acute liver failure. Pending further evaluation regarding AMS.    # Neuro:  Acute Metabolic Encephalopathy   -Likely secondary to elevated ammonia level along with liver failure.   -CT head showing concerns for cerebral edema or global hypoxic injury  -CTA and CTV- appear to have no major findings  -MRI did not show any acute issues  -vEEG- no major findings, repeat 9/23 no seizures  - Ammonia improving     Plan - still encephopathic   - OFF sedation   -Neurology on board  -S/P PLEX- got 1st round 9/17 and 2nd 9/19, 3rd round 9/20  -Continue Lactulose and Miralax- ammonia improving   -c/w Rifaximin  - Trial Seroquel 50 at PM for agitation and will keep off Precedex     #Resp:   -Patient intubated for airway protection  - Extubated 9/23  - comfortable on NC     #CV:  -Patient not on any vasopressors at this time. Hemodynamically stable.   - TTE 9/2023 - EF 65%     HTN  -On PO Labetalol 100mg TID    #GI:  // Acute Decompensated Liver Failure - patient with long history of etoh abuse with evidence of hepatic steatosis on imaging.  Appears to be related to a prior ischemic episode   - RUQ US: Distended gallbladder with wall thickening up to 5 mm and trace pericholecystic fluid without evidence of cholelithiasis or biliary dilatation. Negative sonographic Gibson's sign.   - CTAP: Nondistended gallbladder with diffuse nonspecific wall edema. Severe fatty liver.  - labs significant for thrombocytopenia, transaminitis, anemia, elevated bilirubin & decreased fibrinogen, elevated lactate.   Plan  -Continue Thiamine and Folate  - s/p NAC, currently off   -S/P PLEX as above   -Hepatology consulted- ordered Lab work recommended by them   -Continue Lactulose and Miralax  -Continue Rifaxamin    #Diet  - c/w  tube feeds     #Renal:  -No acute issues. Will monitor Cr and urine output.   Hypervolemic state  - 40 IV lasix 9/23-9/24, monitor UOP     #hypernatremia   - 350 q6 free water flushes, stable at 155     #ID:  - s/p CTX, Fever 100.9   -Continue Zosyn ( 9/23- )   -Will stop Vanc    #Heme:  Concern for DIC - stable   - elevated coags, d-dimer & fibrinogen 40  - S/P 3u cryo & 1 FFP in setting of DIC and liver failure-  - FU CBC. tranfuse for Hg <7   - Fibrinogen now improved  Plan  -Will continue to monitor    #Elevated INR  -INR now improved to 1.84  Plan  -Will monitor    #anemia - chronic disease- no active bleeding   - total prbcs count 1u - 9/23    #DVT ppx  -Heparin 5K tid    #Endo:  - cont to monitor FS Q6H     Lines  -Traciley placed 9/17- removed 9/23  -Cindy placed 9/18- will remove today

## 2024-09-24 NOTE — PROGRESS NOTE ADULT - ATTENDING COMMENTS
Agree with above. Seen and examined with team on rounds. Critically ill with ETOH withdrawal syndrome with encephalopathy. Extubated yesterday and is bale to speak to his family in [facility] and is making sense. Has some peripheral weakness from intubation and sedation for several days. Will start Seroquel for agitation and stop Precedex gtt. Supportive care. PT/OT. Liver and kidney function still abnormal. Video EEG with diffuse slowing but no active seizures. Will continue to follow.

## 2024-09-24 NOTE — PROGRESS NOTE ADULT - SUBJECTIVE AND OBJECTIVE BOX
INTERVAL HPI/OVERNIGHT EVENTS:    SUBJECTIVE: Patient seen and examined at bedside.     CONSTITUTIONAL: No weakness, fevers or chills  EYES/ENT: No visual changes;  No vertigo or throat pain   NECK: No pain or stiffness  RESPIRATORY: No cough, wheezing, hemoptysis; No shortness of breath  CARDIOVASCULAR: No chest pain or palpitations  GASTROINTESTINAL: No abdominal or epigastric pain. No nausea, vomiting, or hematemesis; No diarrhea or constipation. No melena or hematochezia.  GENITOURINARY: No dysuria, frequency or hematuria  NEUROLOGICAL: No numbness or weakness  SKIN: No itching, rashes    OBJECTIVE:    VITAL SIGNS:  ICU Vital Signs Last 24 Hrs  T(C): 37.4 (24 Sep 2024 04:00), Max: 37.7 (23 Sep 2024 08:00)  T(F): 99.3 (24 Sep 2024 04:00), Max: 99.9 (23 Sep 2024 08:00)  HR: 72 (24 Sep 2024 07:00) (71 - 84)  BP: 98/51 (24 Sep 2024 07:00) (98/51 - 165/70)  BP(mean): 71 (24 Sep 2024 07:00) (71 - 107)  ABP: --  ABP(mean): --  RR: 13 (24 Sep 2024 07:00) (13 - 29)  SpO2: 100% (24 Sep 2024 07:00) (83% - 100%)    O2 Parameters below as of 23 Sep 2024 20:00  Patient On (Oxygen Delivery Method): room air          Mode: CPAP with PS, FiO2: 21, PEEP: 5, PS: 0, MAP: 7    09-23 @ 07:01  -  09-24 @ 07:00  --------------------------------------------------------  IN: 2963.8 mL / OUT: 4504 mL / NET: -1540.2 mL      CAPILLARY BLOOD GLUCOSE          PHYSICAL EXAM:    General: NAD  HEENT: NC/AT; PERRL, clear conjunctiva  Neck: supple  Respiratory: CTA b/l  Cardiovascular: +S1/S2; RRR  Abdomen: soft, NT/ND; +BS x4  Extremities: WWP, 2+ peripheral pulses b/l; no LE edema  Skin: normal color and turgor; no rash  Neurological:    MEDICATIONS:  MEDICATIONS  (STANDING):  chlorhexidine 4% Liquid 1 Application(s) Topical <User Schedule>  dexMEDEtomidine Infusion 0.2 MICROgram(s)/kG/Hr (4.15 mL/Hr) IV Continuous <Continuous>  folic acid Injectable 1 milliGRAM(s) IV Push daily  heparin   Injectable 5000 Unit(s) SubCutaneous every 8 hours  labetalol 200 milliGRAM(s) Oral three times a day  lactulose Syrup 30 Gram(s) Oral every 6 hours  mupirocin 2% Nasal 1 Application(s) Both Nostrils two times a day  pantoprazole  Injectable 40 milliGRAM(s) IV Push two times a day  piperacillin/tazobactam IVPB.. 3.375 Gram(s) IV Intermittent every 8 hours  rifAXIMin 550 milliGRAM(s) Oral two times a day  thiamine 100 milliGRAM(s) Oral daily    MEDICATIONS  (PRN):  fentaNYL    Injectable 50 MICROGram(s) IV Push every 4 hours PRN Agitation      ALLERGIES:  Allergies    No Known Allergies    Intolerances        LABS:                        7.1    10.00 )-----------( 137      ( 24 Sep 2024 00:28 )             22.0     09-24    155[H]  |  126[H]  |  28[H]  ----------------------------<  114[H]  3.6   |  20[L]  |  1.38[H]    Ca    8.4      24 Sep 2024 00:28  Phos  3.0     09-24  Mg     2.6     09-24    TPro  5.7[L]  /  Alb  2.4[L]  /  TBili  9.0[H]  /  DBili  x   /  AST  94[H]  /  ALT  137[H]  /  AlkPhos  106  09-24    PT/INR - ( 24 Sep 2024 00:28 )   PT: 16.2 sec;   INR: 1.49 ratio         PTT - ( 24 Sep 2024 00:28 )  PTT:49.3 sec  Urinalysis Basic - ( 24 Sep 2024 00:28 )    Color: x / Appearance: x / SG: x / pH: x  Gluc: 114 mg/dL / Ketone: x  / Bili: x / Urobili: x   Blood: x / Protein: x / Nitrite: x   Leuk Esterase: x / RBC: x / WBC x   Sq Epi: x / Non Sq Epi: x / Bacteria: x        RADIOLOGY & ADDITIONAL TESTS: Reviewed. INTERVAL HPI/OVERNIGHT EVENTS: Overnight, started on precedex     SUBJECTIVE: Patient seen and examined at bedside. Awake, arousable to verbal stimuli. No meaningful conversation.     ROS unable to be obtained due to altered mentation     VITAL SIGNS:  ICU Vital Signs Last 24 Hrs  T(C): 37.4 (24 Sep 2024 04:00), Max: 37.7 (23 Sep 2024 08:00)  T(F): 99.3 (24 Sep 2024 04:00), Max: 99.9 (23 Sep 2024 08:00)  HR: 72 (24 Sep 2024 07:00) (71 - 84)  BP: 98/51 (24 Sep 2024 07:00) (98/51 - 165/70)  BP(mean): 71 (24 Sep 2024 07:00) (71 - 107)  ABP: --  ABP(mean): --  RR: 13 (24 Sep 2024 07:00) (13 - 29)  SpO2: 100% (24 Sep 2024 07:00) (83% - 100%)    O2 Parameters below as of 23 Sep 2024 20:00  Patient On (Oxygen Delivery Method): room air      Mode: CPAP with PS, FiO2: 21, PEEP: 5, PS: 0, MAP: 7    09-23 @ 07:01  -  09-24 @ 07:00  --------------------------------------------------------  IN: 2963.8 mL / OUT: 4504 mL / NET: -1540.2 mL      CAPILLARY BLOOD GLUCOSE  PHYSICAL EXAM:  HEENT: NC/AT; PERRL, Sclera Icteric   Neck: supple  Respiratory: CTA b/l, intubated and sedated   Cardiovascular: +S1/S2; RRR  Abdomen: soft, NT/ND; +BS x4  Extremities: WWP, 2+ peripheral pulses b/l; B/L arm and lower extremity edema   Skin: normal color and turgor; no rash  Neurological: AO1 arousble to verbal stimuli     MEDICATIONS:  MEDICATIONS  (STANDING):  chlorhexidine 4% Liquid 1 Application(s) Topical <User Schedule>  dexMEDEtomidine Infusion 0.2 MICROgram(s)/kG/Hr (4.15 mL/Hr) IV Continuous <Continuous>  folic acid Injectable 1 milliGRAM(s) IV Push daily  heparin   Injectable 5000 Unit(s) SubCutaneous every 8 hours  labetalol 200 milliGRAM(s) Oral three times a day  lactulose Syrup 30 Gram(s) Oral every 6 hours  mupirocin 2% Nasal 1 Application(s) Both Nostrils two times a day  pantoprazole  Injectable 40 milliGRAM(s) IV Push two times a day  piperacillin/tazobactam IVPB.. 3.375 Gram(s) IV Intermittent every 8 hours  rifAXIMin 550 milliGRAM(s) Oral two times a day  thiamine 100 milliGRAM(s) Oral daily    MEDICATIONS  (PRN):  fentaNYL    Injectable 50 MICROGram(s) IV Push every 4 hours PRN Agitation      ALLERGIES:  Allergies    No Known Allergies    Intolerances        LABS:                        7.1    10.00 )-----------( 137      ( 24 Sep 2024 00:28 )             22.0     09-24    155[H]  |  126[H]  |  28[H]  ----------------------------<  114[H]  3.6   |  20[L]  |  1.38[H]    Ca    8.4      24 Sep 2024 00:28  Phos  3.0     09-24  Mg     2.6     09-24    TPro  5.7[L]  /  Alb  2.4[L]  /  TBili  9.0[H]  /  DBili  x   /  AST  94[H]  /  ALT  137[H]  /  AlkPhos  106  09-24    PT/INR - ( 24 Sep 2024 00:28 )   PT: 16.2 sec;   INR: 1.49 ratio         PTT - ( 24 Sep 2024 00:28 )  PTT:49.3 sec  Urinalysis Basic - ( 24 Sep 2024 00:28 )    Color: x / Appearance: x / SG: x / pH: x  Gluc: 114 mg/dL / Ketone: x  / Bili: x / Urobili: x   Blood: x / Protein: x / Nitrite: x   Leuk Esterase: x / RBC: x / WBC x   Sq Epi: x / Non Sq Epi: x / Bacteria: x        RADIOLOGY & ADDITIONAL TESTS: Reviewed. INTERVAL HPI/OVERNIGHT EVENTS: Overnight, started on precedex due to agitation     SUBJECTIVE: Patient seen and examined at bedside. Awake, arousable to verbal stimuli. No meaningful conversation.     ROS unable to be obtained due to altered mentation     VITAL SIGNS:  ICU Vital Signs Last 24 Hrs  T(C): 37.4 (24 Sep 2024 04:00), Max: 37.7 (23 Sep 2024 08:00)  T(F): 99.3 (24 Sep 2024 04:00), Max: 99.9 (23 Sep 2024 08:00)  HR: 72 (24 Sep 2024 07:00) (71 - 84)  BP: 98/51 (24 Sep 2024 07:00) (98/51 - 165/70)  BP(mean): 71 (24 Sep 2024 07:00) (71 - 107)  ABP: --  ABP(mean): --  RR: 13 (24 Sep 2024 07:00) (13 - 29)  SpO2: 100% (24 Sep 2024 07:00) (83% - 100%)    O2 Parameters below as of 23 Sep 2024 20:00  Patient On (Oxygen Delivery Method): room air      Mode: CPAP with PS, FiO2: 21, PEEP: 5, PS: 0, MAP: 7    09-23 @ 07:01  -  09-24 @ 07:00  --------------------------------------------------------  IN: 2963.8 mL / OUT: 4504 mL / NET: -1540.2 mL      CAPILLARY BLOOD GLUCOSE  PHYSICAL EXAM:  HEENT: NC/AT; PERRL, Sclera Icteric   Neck: supple  Respiratory: CTA b/l, intubated and sedated   Cardiovascular: +S1/S2; RRR  Abdomen: soft, NT/ND; +BS x4  Extremities: WWP, 2+ peripheral pulses b/l; B/L arm and lower extremity edema   Skin: normal color and turgor; no rash  Neurological: AO1 arousble to verbal stimuli     MEDICATIONS:  MEDICATIONS  (STANDING):  chlorhexidine 4% Liquid 1 Application(s) Topical <User Schedule>  dexMEDEtomidine Infusion 0.2 MICROgram(s)/kG/Hr (4.15 mL/Hr) IV Continuous <Continuous>  folic acid Injectable 1 milliGRAM(s) IV Push daily  heparin   Injectable 5000 Unit(s) SubCutaneous every 8 hours  labetalol 200 milliGRAM(s) Oral three times a day  lactulose Syrup 30 Gram(s) Oral every 6 hours  mupirocin 2% Nasal 1 Application(s) Both Nostrils two times a day  pantoprazole  Injectable 40 milliGRAM(s) IV Push two times a day  piperacillin/tazobactam IVPB.. 3.375 Gram(s) IV Intermittent every 8 hours  rifAXIMin 550 milliGRAM(s) Oral two times a day  thiamine 100 milliGRAM(s) Oral daily    MEDICATIONS  (PRN):  fentaNYL    Injectable 50 MICROGram(s) IV Push every 4 hours PRN Agitation      ALLERGIES:  Allergies    No Known Allergies    Intolerances        LABS:                        7.1    10.00 )-----------( 137      ( 24 Sep 2024 00:28 )             22.0     09-24    155[H]  |  126[H]  |  28[H]  ----------------------------<  114[H]  3.6   |  20[L]  |  1.38[H]    Ca    8.4      24 Sep 2024 00:28  Phos  3.0     09-24  Mg     2.6     09-24    TPro  5.7[L]  /  Alb  2.4[L]  /  TBili  9.0[H]  /  DBili  x   /  AST  94[H]  /  ALT  137[H]  /  AlkPhos  106  09-24    PT/INR - ( 24 Sep 2024 00:28 )   PT: 16.2 sec;   INR: 1.49 ratio         PTT - ( 24 Sep 2024 00:28 )  PTT:49.3 sec  Urinalysis Basic - ( 24 Sep 2024 00:28 )    Color: x / Appearance: x / SG: x / pH: x  Gluc: 114 mg/dL / Ketone: x  / Bili: x / Urobili: x   Blood: x / Protein: x / Nitrite: x   Leuk Esterase: x / RBC: x / WBC x   Sq Epi: x / Non Sq Epi: x / Bacteria: x        RADIOLOGY & ADDITIONAL TESTS: Reviewed.

## 2024-09-24 NOTE — CHART NOTE - NSCHARTNOTEFT_GEN_A_CORE
Initial 60 minutes of record reviewed.  There are no epileptiform abnormalities noted. Background is continuous and diffusely slow. EEG is limited by artifact  Full report to follow after review of completed study tomorrow.     BILL Fuentes.  Attending Physician, [facility]      [facility] EEG Reading Room Ph#: [phone]  Epilepsy Answering Service after 5PM and before 8:30AM: Ph#: [phone]

## 2024-09-24 NOTE — PROGRESS NOTE ADULT - SUBJECTIVE AND OBJECTIVE BOX
NEUROLOGY FOLLOW-UP CONSULT NOTE    RFC: Cerebral edema    Interval history: No acute neurologic events overnight. Patient remains extubated and more interactive, but is still confused. No focal neurologic deficits or abnormal movements noted. He was able to stand up with the assistance of PT today.    Meds:  MEDICATIONS  (STANDING):  chlorhexidine 4% Liquid 1 Application(s) Topical <User Schedule>  folic acid Injectable 1 milliGRAM(s) IV Push daily  heparin   Injectable 5000 Unit(s) SubCutaneous every 8 hours  labetalol 100 milliGRAM(s) Oral three times a day  lactulose Syrup 30 Gram(s) Oral every 6 hours  mupirocin 2% Nasal 1 Application(s) Both Nostrils two times a day  pantoprazole  Injectable 40 milliGRAM(s) IV Push two times a day  piperacillin/tazobactam IVPB.. 3.375 Gram(s) IV Intermittent every 8 hours  QUEtiapine 50 milliGRAM(s) Oral at bedtime  rifAXIMin 550 milliGRAM(s) Oral two times a day  thiamine 100 milliGRAM(s) Oral daily    MEDICATIONS  (PRN):  fentaNYL    Injectable 50 MICROGram(s) IV Push every 4 hours PRN Agitation      PMHx/PSHx/FHx/SHx:  Liver failure without hepatic coma    Complex Care    No pertinent family history in first degree relatives (Father, Mother)    No pertinent family history in first degree relatives    No pertinent family history in first degree relatives    FH: HTN (hypertension)    Handoff    Alcohol abuse    PUD (peptic ulcer disease)    Mixed hyperlipidemia    EtOH dependence    Gastritis    Substance abuse    DTs (delirium tremens)    HTN (hypertension)    Elevated lipase    Acetaminophen toxicity    No significant past surgical history    HEPATIC FAILURE, UNSPECIFIED W    [id number]        Allergies:  No Known Allergies      ROS: Due to clinical condition unable to assess (ABBEY)    O:  T(C): 36.7 (09-24-24 @ 08:00), Max: 37.7 (09-23-24 @ 12:00)  HR: 78 (09-24-24 @ 11:00) (69 - 84)  BP: 98/56 (09-24-24 @ 11:00) (98/51 - 152/70)  RR: 15 (09-24-24 @ 11:00) (12 - 29)  SpO2: 100% (09-24-24 @ 11:00) (83% - 100%)    Focused neurologic exam:  MS - AAO x1, speech perseverative on his car and moderate dysarthria but otherwise fluent, rarely follows simple commands. ABBEY rep/naming, attn/conc/recent and remote memory/fund of knowledge  CN - PERRL, EOMI by OCR, (+) face sens/str by corneals b/l, (+) spontaneous respirations, VFF to threat b/l, hearing grossly intact to conversation b/l, SCM at least 4/5 b/l and symmetric. ABBEY tongue/palate  Motor - Normal bulk. Dec tone throughout and no spontaneous movements noted. LUE at least 3+/5, RUE at least 2+/5, BLE's at least 2/5 and symmetric  Sens - LT/temp intact all  DTR's - 2+ and brisk BUE's, 3+ L KJ, 0+ R KJ, 0+ L AJ, 2+ R AJ, and neutral b/l plantar response  Coord - ABBEY  Gait and station - ABBEY    Pertinent labs/studies:  CBC with low H/H 7/22 and MCV dec 79, low plt 137  PT/INR inc 16.2/1.49, PTT inc 49.3  BMP with inc Na 155, inc BUN/Cr 28/1.38 (GFR 60), otherwise essentially WNL  Albumin dec 2.4, LFT's with inc ALT//94  Mg WNL, Phos WNL  Ceruloplasmin dec 12, Hep screen (-), A1C 5.5%, UA (-), NH3 inc 227 -> 144 -> 94 -> 46 WNL, CPK inc 725, TSH dec 0.05, T4 WNL, B12 WNL, folate WNL, WNV IgG (+)/IgM (-), syphilis serology TP (-), Lyme (-), B6 WNL    vEEG 9/24 -  Moderate generalized slowing    < from: MR Head w/wo IV Cont (09.19.24 @ 00:56) >  NO EVIDENCE OF INTRACRANIAL HEMORRHAGE, ACUTE TERRITORIAL   INFARCT OR AREA OF ABNORMAL ENHANCEMENT.    < end of copied text >      vEEG 9/18 -  EEG Classification / Summary:   Abnormal  EEG :   1. Diffuse suppression  Tachycardia     Clinical Impression:  1. Diffuse suppression that is persistent and nonreactive to stimulation which indicates very severe diffuse cerebral dysfunction    There were no seizures or  epileptiform abnormalities recorded.      < from: CT Venogram Brain w/ IV Cont (09.17.24 @ 18:46) >  CT HEAD:  No CT evidence of acute intracranial pathology.    CTA NECK:  No evidence of significant stenosis or occlusion.    The endotracheal tube appears to terminate immediately superior to the   javon.  This may be due to flexion of the head/neck during the CT scan.    Repeat chest radiograph is recommended to confirm proper positioning.    CTA HEAD:  No large vessel occlusion, significant stenosis or vascular abnormality   identified.    CT VENOGRAM:  Focal hypodensity in the right sigmoid sinus, without occlusion, probably   represents streak artifact from overlying EEG leads however nonocclusive   thrombus cannot be excluded.  The right transverse sinus and right   jugular bulb are patent without suspicious filling defect.  Follow-up MR   venogram is recommended for further evaluation    < end of copied text >      < from: CT Head No Cont (09.17.24 @ 09:09) >    Diffuse sulcal effacement and loss of gray-white   differentiation as compared to the prior head CT may suggest cerebral   edema versus global hypoxic ischemic injury. MRI is recommended for   further evaluation.    < end of copied text >

## 2024-09-24 NOTE — PROGRESS NOTE ADULT - ASSESSMENT
Encephalopathy  EtOH use disorder  Acute cholecystitis and liver failure  HTN  Pancytopenia  Hyperammonemia  Cerebral edema  Hypernatremia  Weakness    - Etiology for diffuse cerebral edema with above exam is most likely secondary to severe hyperammonemia/hepatic encephalopathy. However, cannot completely exclude other causes such as cerebrovascular, infectious, inflammatory, or other toxic/metabolic. CT head, personally reviewed by me, with diffuse sulcal effacement and blurring of the gray-white junction most consistent with cerebral edema but no hemorrhage. Abnormal movements are posturing and NOT seizures. 9/18 - Hypertonic saline given and being transitioned to PLEX. CTA head/neck and CTV head, personally reviewed by me, largely unrevealing with likely artifact on CTV head (even if non-occlusive venous thrombus found would not be contributing to current clinical picture). EEG with diffuse suppression likely indicating cortical dysfunction and possible death; although cannot exclude lingering effect of sedation this is much less likely. 9/19 - Lab markers slowly improving but clinically unchanged. MRI brain, personally reviewed by me, with mild T2/FLAIR hyperintensity thoughout the entire cortex as well as mild to moderate sulcal effacement laterally likely consistent with cerebral edema but no blurring of the gray-white junction or other acute intracranial findings. 9/23 - Extubated and slowly improved. Diffusely weak, unclear if related to critical illness neuropathy/myopathy or other etiology. Will continue to monitor. 9/24 - Improved but continues with cognitive deficits. Weakness is less apparent but appears to be more volitional as he can stand with PT  - CTA head/neck w/, CTV head w/o with results as above  - MRI brain w/ and w/o with results as above  - vEEG with diffuse suppression and slowing with evidence for focal slowing, epileptiform discharges, or seizures. STOPPED. Await final report from new vEEG  - Optimize sedation with minimizing propofol, benzodiazepines, or barbiturates (Precedex, fentanyl, or hydromorphone OK)  - Await labs for additional causes with Vitamin D 25 OH  - Above recommendations discussed with MICU team, who verbalized agreement and understanding  - Continue to address above medical problems, as you are doing  - Will sign off, please call [phone]) with additional questions or concerns

## 2024-09-25 LAB
ADD ON TEST-SPECIMEN IN LAB: SIGNIFICANT CHANGE UP
ALBUMIN SERPL ELPH-MCNC: 2.4 G/DL — LOW (ref 3.3–5)
ALP SERPL-CCNC: 118 U/L — SIGNIFICANT CHANGE UP (ref 40–120)
ALT FLD-CCNC: 135 U/L — HIGH (ref 10–45)
AMMONIA BLD-MCNC: 22 UMOL/L — SIGNIFICANT CHANGE UP (ref 11–55)
ANION GAP SERPL CALC-SCNC: 12 MMOL/L — SIGNIFICANT CHANGE UP (ref 5–17)
APPEARANCE UR: CLEAR — SIGNIFICANT CHANGE UP
APTT BLD: 51.1 SEC — HIGH (ref 24.5–35.6)
AST SERPL-CCNC: 98 U/L — HIGH (ref 10–40)
BACTERIA # UR AUTO: NEGATIVE /HPF — SIGNIFICANT CHANGE UP
BASOPHILS # BLD AUTO: 0.05 K/UL — SIGNIFICANT CHANGE UP (ref 0–0.2)
BASOPHILS NFR BLD AUTO: 0.5 % — SIGNIFICANT CHANGE UP (ref 0–2)
BILIRUB SERPL-MCNC: 10.3 MG/DL — HIGH (ref 0.2–1.2)
BILIRUB UR-MCNC: ABNORMAL
BUN SERPL-MCNC: 26 MG/DL — HIGH (ref 7–23)
CALCIUM SERPL-MCNC: 8.5 MG/DL — SIGNIFICANT CHANGE UP (ref 8.4–10.5)
CAST: 1 /LPF — SIGNIFICANT CHANGE UP (ref 0–4)
CHLORIDE SERPL-SCNC: 125 MMOL/L — HIGH (ref 96–108)
CK SERPL-CCNC: 358 U/L — HIGH (ref 30–200)
CO2 SERPL-SCNC: 18 MMOL/L — LOW (ref 22–31)
COLOR SPEC: SIGNIFICANT CHANGE UP
CREAT ?TM UR-MCNC: 90 MG/DL — SIGNIFICANT CHANGE UP
CREAT SERPL-MCNC: 1.74 MG/DL — HIGH (ref 0.5–1.3)
DIFF PNL FLD: NEGATIVE — SIGNIFICANT CHANGE UP
EGFR: 45 ML/MIN/1.73M2 — LOW
EOSINOPHIL # BLD AUTO: 0.35 K/UL — SIGNIFICANT CHANGE UP (ref 0–0.5)
EOSINOPHIL NFR BLD AUTO: 3.7 % — SIGNIFICANT CHANGE UP (ref 0–6)
GAS PNL BLDV: SIGNIFICANT CHANGE UP
GLUCOSE BLDC GLUCOMTR-MCNC: 120 MG/DL — HIGH (ref 70–99)
GLUCOSE BLDC GLUCOMTR-MCNC: 82 MG/DL — SIGNIFICANT CHANGE UP (ref 70–99)
GLUCOSE BLDC GLUCOMTR-MCNC: 97 MG/DL — SIGNIFICANT CHANGE UP (ref 70–99)
GLUCOSE SERPL-MCNC: 78 MG/DL — SIGNIFICANT CHANGE UP (ref 70–99)
GLUCOSE UR QL: NEGATIVE MG/DL — SIGNIFICANT CHANGE UP
HCT VFR BLD CALC: 21.7 % — LOW (ref 39–50)
HGB BLD-MCNC: 7.2 G/DL — LOW (ref 13–17)
IMM GRANULOCYTES NFR BLD AUTO: 1.7 % — HIGH (ref 0–0.9)
INR BLD: 1.44 RATIO — HIGH (ref 0.85–1.16)
KETONES UR-MCNC: NEGATIVE MG/DL — SIGNIFICANT CHANGE UP
LEUKOCYTE ESTERASE UR-ACNC: ABNORMAL
LYMPHOCYTES # BLD AUTO: 2.42 K/UL — SIGNIFICANT CHANGE UP (ref 1–3.3)
LYMPHOCYTES # BLD AUTO: 25.9 % — SIGNIFICANT CHANGE UP (ref 13–44)
MAGNESIUM SERPL-MCNC: 2.4 MG/DL — SIGNIFICANT CHANGE UP (ref 1.6–2.6)
MCHC RBC-ENTMCNC: 26.2 PG — LOW (ref 27–34)
MCHC RBC-ENTMCNC: 32.3 GM/DL — SIGNIFICANT CHANGE UP (ref 32–36)
MCV RBC AUTO: 81.3 FL — SIGNIFICANT CHANGE UP (ref 80–100)
MONOCYTES # BLD AUTO: 0.99 K/UL — HIGH (ref 0–0.9)
MONOCYTES NFR BLD AUTO: 10.6 % — SIGNIFICANT CHANGE UP (ref 2–14)
NEUTROPHILS # BLD AUTO: 5.37 K/UL — SIGNIFICANT CHANGE UP (ref 1.8–7.4)
NEUTROPHILS NFR BLD AUTO: 57.6 % — SIGNIFICANT CHANGE UP (ref 43–77)
NITRITE UR-MCNC: NEGATIVE — SIGNIFICANT CHANGE UP
NRBC # BLD: 0 /100 WBCS — SIGNIFICANT CHANGE UP (ref 0–0)
PH UR: 7.5 — SIGNIFICANT CHANGE UP (ref 5–8)
PHOSPHATE SERPL-MCNC: 2.8 MG/DL — SIGNIFICANT CHANGE UP (ref 2.5–4.5)
PLATELET # BLD AUTO: 126 K/UL — LOW (ref 150–400)
POTASSIUM SERPL-MCNC: 3.6 MMOL/L — SIGNIFICANT CHANGE UP (ref 3.5–5.3)
POTASSIUM SERPL-SCNC: 3.6 MMOL/L — SIGNIFICANT CHANGE UP (ref 3.5–5.3)
PROT SERPL-MCNC: 6.3 G/DL — SIGNIFICANT CHANGE UP (ref 6–8.3)
PROT UR-MCNC: NEGATIVE MG/DL — SIGNIFICANT CHANGE UP
PROTHROM AB SERPL-ACNC: 16.5 SEC — HIGH (ref 9.9–13.4)
RBC # BLD: 2.67 M/UL — LOW (ref 4.2–5.8)
RBC # FLD: 24.9 % — HIGH (ref 10.3–14.5)
RBC CASTS # UR COMP ASSIST: 2 /HPF — SIGNIFICANT CHANGE UP (ref 0–4)
REVIEW: SIGNIFICANT CHANGE UP
SODIUM SERPL-SCNC: 155 MMOL/L — HIGH (ref 135–145)
SODIUM UR-SCNC: 118 MMOL/L — SIGNIFICANT CHANGE UP
SP GR SPEC: 1.02 — SIGNIFICANT CHANGE UP (ref 1–1.03)
SQUAMOUS # UR AUTO: 1 /HPF — SIGNIFICANT CHANGE UP (ref 0–5)
UROBILINOGEN FLD QL: 0.2 MG/DL — SIGNIFICANT CHANGE UP (ref 0.2–1)
WBC # BLD: 9.34 K/UL — SIGNIFICANT CHANGE UP (ref 3.8–10.5)
WBC # FLD AUTO: 9.34 K/UL — SIGNIFICANT CHANGE UP (ref 3.8–10.5)
WBC UR QL: 0 /HPF — SIGNIFICANT CHANGE UP (ref 0–5)
YEAST-LIKE CELLS: PRESENT

## 2024-09-25 PROCEDURE — 99223 1ST HOSP IP/OBS HIGH 75: CPT | Mod: GC

## 2024-09-25 RX ORDER — ISOLEUCINE, LEUCINE, LYSINE ACETATE, METHIONINE, PHENYLALANINE, THREONINE, TRYPTOPHAN, VALINE, ALANINE, ARGININE, ASPARTIC ACID, GLUTAMIC ACID, HISTIDINE, PROLINE, SERINE, N-ACETYLTYROSINE, AND GLYCINE 660; 1000; 1050; 172; 298; 400; 200; 500; 993; 1018; 700; 738; 300; 722; 530; 270; 500 MG/100ML; MG/100ML; MG/100ML; MG/100ML; MG/100ML; MG/100ML; MG/100ML; MG/100ML; MG/100ML; MG/100ML; MG/100ML; MG/100ML; MG/100ML; MG/100ML; MG/100ML; MG/100ML; MG/100ML
1000 INJECTION, SOLUTION INTRAVENOUS
Refills: 0 | Status: DISCONTINUED | OUTPATIENT
Start: 2024-09-25 | End: 2024-09-25

## 2024-09-25 RX ORDER — ALCOHOL ANTISEPTIC PADS
25 PADS, MEDICATED (EA) TOPICAL ONCE
Refills: 0 | Status: COMPLETED | OUTPATIENT
Start: 2024-09-25 | End: 2024-09-25

## 2024-09-25 RX ORDER — SODIUM CHLORIDE IRRIG SOLUTION 0.9 %
1000 SOLUTION, IRRIGATION IRRIGATION
Refills: 0 | Status: DISCONTINUED | OUTPATIENT
Start: 2024-09-25 | End: 2024-09-25

## 2024-09-25 RX ORDER — SODIUM CHLORIDE IRRIG SOLUTION 0.9 %
1000 SOLUTION, IRRIGATION IRRIGATION
Refills: 0 | Status: DISCONTINUED | OUTPATIENT
Start: 2024-09-25 | End: 2024-09-26

## 2024-09-25 RX ORDER — QUETIAPINE FUMARATE 50 MG/1
25 TABLET, FILM COATED ORAL AT BEDTIME
Refills: 0 | Status: DISCONTINUED | OUTPATIENT
Start: 2024-09-25 | End: 2024-09-28

## 2024-09-25 RX ORDER — QUETIAPINE FUMARATE 50 MG/1
25 TABLET, FILM COATED ORAL DAILY
Refills: 0 | Status: DISCONTINUED | OUTPATIENT
Start: 2024-09-25 | End: 2024-09-28

## 2024-09-25 RX ADMIN — Medication 100 MILLILITER(S): at 09:15

## 2024-09-25 RX ADMIN — QUETIAPINE FUMARATE 25 MILLIGRAM(S): 50 TABLET, FILM COATED ORAL at 21:28

## 2024-09-25 RX ADMIN — Medication 5000 UNIT(S): at 13:45

## 2024-09-25 RX ADMIN — PIPERACILLIN SODIUM AND TAZOBACTAM SODIUM 25 GRAM(S): 12; 1.5 INJECTION, POWDER, LYOPHILIZED, FOR SOLUTION INTRAVENOUS at 21:28

## 2024-09-25 RX ADMIN — PIPERACILLIN SODIUM AND TAZOBACTAM SODIUM 25 GRAM(S): 12; 1.5 INJECTION, POWDER, LYOPHILIZED, FOR SOLUTION INTRAVENOUS at 05:23

## 2024-09-25 RX ADMIN — LACTULOSE 30 GRAM(S): 10 SOLUTION ORAL; RECTAL at 12:40

## 2024-09-25 RX ADMIN — Medication 75 MILLILITER(S): at 00:57

## 2024-09-25 RX ADMIN — QUETIAPINE FUMARATE 25 MILLIGRAM(S): 50 TABLET, FILM COATED ORAL at 12:40

## 2024-09-25 RX ADMIN — Medication 100 MILLILITER(S): at 21:28

## 2024-09-25 RX ADMIN — PANTOPRAZOLE SODIUM 40 MILLIGRAM(S): 40 TABLET, DELAYED RELEASE ORAL at 05:23

## 2024-09-25 RX ADMIN — PIPERACILLIN SODIUM AND TAZOBACTAM SODIUM 25 GRAM(S): 12; 1.5 INJECTION, POWDER, LYOPHILIZED, FOR SOLUTION INTRAVENOUS at 13:45

## 2024-09-25 RX ADMIN — LACTULOSE 30 GRAM(S): 10 SOLUTION ORAL; RECTAL at 18:17

## 2024-09-25 RX ADMIN — THIAMINE HYDROCHLORIDE 100 MILLIGRAM(S): 100 INJECTION, SOLUTION INTRAMUSCULAR; INTRAVENOUS at 12:41

## 2024-09-25 RX ADMIN — CHLORHEXIDINE GLUCONATE ORAL RINSE 1 APPLICATION(S): 1.2 SOLUTION DENTAL at 05:23

## 2024-09-25 RX ADMIN — LABETALOL HYDROCHLORIDE 100 MILLIGRAM(S): 200 TABLET, FILM COATED ORAL at 21:28

## 2024-09-25 RX ADMIN — FOLIC ACID 1 MILLIGRAM(S): 1 TABLET ORAL at 12:41

## 2024-09-25 RX ADMIN — ISOLEUCINE, LEUCINE, LYSINE ACETATE, METHIONINE, PHENYLALANINE, THREONINE, TRYPTOPHAN, VALINE, ALANINE, ARGININE, ASPARTIC ACID, GLUTAMIC ACID, HISTIDINE, PROLINE, SERINE, N-ACETYLTYROSINE, AND GLYCINE 50 MILLILITER(S)
660; 1000; 1050; 172; 298; 400; 200; 500; 993; 1018; 700; 738; 300; 722; 530; 270; 500 INJECTION, SOLUTION INTRAVENOUS at 05:29

## 2024-09-25 RX ADMIN — LABETALOL HYDROCHLORIDE 100 MILLIGRAM(S): 200 TABLET, FILM COATED ORAL at 05:23

## 2024-09-25 RX ADMIN — Medication 5000 UNIT(S): at 21:28

## 2024-09-25 RX ADMIN — LABETALOL HYDROCHLORIDE 100 MILLIGRAM(S): 200 TABLET, FILM COATED ORAL at 13:45

## 2024-09-25 RX ADMIN — Medication 25 GRAM(S): at 00:50

## 2024-09-25 RX ADMIN — Medication 5000 UNIT(S): at 05:23

## 2024-09-25 RX ADMIN — PANTOPRAZOLE SODIUM 40 MILLIGRAM(S): 40 TABLET, DELAYED RELEASE ORAL at 18:17

## 2024-09-25 RX ADMIN — LACTULOSE 30 GRAM(S): 10 SOLUTION ORAL; RECTAL at 00:49

## 2024-09-25 RX ADMIN — Medication 100 MILLILITER(S): at 12:42

## 2024-09-25 NOTE — CHART NOTE - NSCHARTNOTEFT_GEN_A_CORE
MICU Transfer Note    Transfer from: MICU    Transfer to: ( X ) Medicine    (  ) Telemetry     (   ) RCU        (    ) Palliative         (   ) Stroke Unit          (   ) Continuous Pulse ox         (   )  __________________    Accepting physician:      HOSPITAL / MICU COURSE:    60 y/o male, PMH ETOH abuse, gastritis, PUD, HLD, hx of hypoxic respiratory failure requiring intubation due to aspiration PNA (most recent intubation Aril 2020), presented to [facility] on 9/13 c/o etoh w/d, abd pain, N/V. Patient underwent an evaluation for acute cholecystitis and was treated for ETOH w/d. At OSH, LFTs began increasing, started on NAC gtt. Pt was transferred to [facility] on 9/15 for AMS 2/2 etoh w/d and evaluation of acute liver failure by Hepatology.     While in MICU, pt was on NAC gtt frtom 9/15-9/23. Per hepatology, pt is unlikely a candidate for LT due to multiple admissions in the past with alcohol withdrawal. Pt was intubated on 9/17, extubated 9/23. Pt received PLEX x3, mannitol, hypertonic saline for hyperammonemia and cerebral edema i/s/o GUNJAN. Pt also given lactulose, ammonia down to 42. Mental status improved.    Pt now stable for transfer to medicine floor.    ASSESSMENT & PLAN:     60 y/o male, PMH ETOH abuse, gastritis, PUD, HLD, hx of hypoxic respiratory failure requiring intubation due to aspiration PNA (most recent intubation Aril 2020), presented to [facility] on 9/13 c/o etoh w/d, abd pain, transferred to [facility] on 9/15 for AMS 2/2 etoh w/d and evaluation of acute liver failure by Hepatology.     =====NEURO=====  #Acute Metabolic Encephalopathy -- improved  - Likely secondary to elevated ammonia level along with liver failure.   - CT head on 9/17: concerns for cerebral edema or global hypoxic injury  - CTA and CTV on 9/17: no major findings  - MR Head on 9/19: no evidence of intracranial hemorrhage, acute territorial infarct, or area of abnormal enhancement  - vEEG no major findings, repeat 9/23 no seizures  - Ammonia improving   - being followed by Neurology  - s/p PLEX x3 -- 9/17, 9/19, 9/20  - c/w Lactulose, Rifaximin  - c/w Thiamine, Folate  - c/w Seroquel       =====RESP=====  - Intubated for airway protection on 9/17  - Extubated 9/23  - satting 100% on RA      =====CARDIO=====  #HTN  - TTE 9/23/24 - EF 65 to 70%   - c/w Labetalol      =====GI=====  #Acute Decompensated Liver Failure   - pt with long history of etoh abuse with evidence of hepatic steatosis on imaging.  Appears to be related to a prior ischemic episode   - RUQ US on 9/13: Distended gallbladder with wall thickening up to 5 mm and trace pericholecystic fluid without evidence of cholelithiasis or biliary dilatation. Negative sonographic Gibson's sign.   - CTAP on 9/13: Nondistended gallbladder with diffuse nonspecific wall edema. Severe fatty liver.  - s/p NAC 9/15-9/23  - s/p PLEX x3 -- 9/17, 9/19, 9/20  - being followed by hepatology     #Diet  - on regular diet  - c/w protonix      =====RENAL=====  #Hypernatremia   - c/w 350 q6 free water  - continue to monitor UOP, Cr      =====ID=====  #No active issues  - s/p CTX (9/16-9/21) for SBP ppx  - c/w empiric Zosyn (9/23- )       =====HEME=====  #Concern for DIC - stable   - s/p 3u cryo & 1 FFP in setting of DIC and liver failure  - continue to monitor H/H, coags, fibrinogen   - transfuse for Hg <7     #Anemia of chronic disease  - no active bleeding     #RUE hematoma  - Duplex on 9/25 shows ***  - keep limb elevated      =====ENDO=====  #Hypoglycemia  - FS q6  - c/w D5 gtt      =====PPX=====  - DVT ppx hep sq  - GI ppx protonix      =====ETHICS=====  - full code    For Followup:  [] RUE hematoma  [] hepatology recs  [] MICU Transfer Note    Transfer from: MICU    Transfer to: ( X ) Medicine    (  ) Telemetry     (   ) RCU        (    ) Palliative         (   ) Stroke Unit          (   ) Continuous Pulse ox         (   )  __________________    Accepting physician:      HOSPITAL / MICU COURSE:    60 y/o male, PMH ETOH abuse, gastritis, PUD, HLD, hx of hypoxic respiratory failure requiring intubation due to aspiration PNA (most recent intubation Aril 2020), presented to [facility] on 9/13 c/o etoh w/d, abd pain, N/V. Patient underwent an evaluation for acute cholecystitis and was treated for ETOH w/d. At OSH, LFTs began increasing, started on NAC gtt. Pt was transferred to [facility] on 9/15 for AMS 2/2 etoh w/d and evaluation of acute liver failure by Hepatology.     While in MICU, pt was on NAC gtt frtom 9/15-9/23. Per hepatology, pt is unlikely a candidate for LT due to multiple admissions in the past with alcohol withdrawal. Pt was intubated on 9/17, extubated 9/23. Pt received PLEX x3, mannitol, hypertonic saline for hyperammonemia and cerebral edema i/s/o GUNJAN. Pt also given lactulose, ammonia down to 42. Mental status improved.    Pt now stable for transfer to medicine floor.    ASSESSMENT & PLAN:   Patient is a 59 year old M with PMHx ETOH abuse with frequent admissions for withdrawal & gastritis admitted to ICU for acute on chronic decompensated liver failure & alcohol withdrawal. Patient now intubated for airway protection. Started on PLEX for Acute liver failure. Pending further evaluation regarding AMS.    # Neuro:  Acute Metabolic Encephalopathy   -Likely secondary to elevated ammonia level along with liver failure.   -CT head showing concerns for cerebral edema or global hypoxic injury  -CTA and CTV- appear to have no major findings  -MRI did not show any acute issues  -vEEG- no major findings, repeat 9/23 no seizures  - Ammonia improving     Plan - still encephopathic   - OFF sedation   -Neurology on board  -S/P PLEX- got 1st round 9/17 and 2nd 9/19, 3rd round 9/20  -Continue Lactulose and Miralax- ammonia improving   -c/w Rifaximin  - Trial Seroquel 25 BID  at PM for agitation and will keep off Precedex     #Resp:   -Patient intubated for airway protection  - Extubated 9/23  - comfortable on NC     #CV:  -Patient not on any vasopressors at this time. Hemodynamically stable.   - TTE 9/2023 - EF 65%     HTN  -On PO Labetalol 200mg TID    #GI:  // Acute Decompensated Liver Failure - patient with long history of etoh abuse with evidence of hepatic steatosis on imaging.  Appears to be related to a prior ischemic episode   - RUQ US: Distended gallbladder with wall thickening up to 5 mm and trace pericholecystic fluid without evidence of cholelithiasis or biliary dilatation. Negative sonographic Gibson's sign.   - CTAP: Nondistended gallbladder with diffuse nonspecific wall edema. Severe fatty liver.  - labs significant for thrombocytopenia, transaminitis, anemia, elevated bilirubin & decreased fibrinogen, elevated lactate.   Plan  -Continue Thiamine and Folate  - s/p NAC, currently off   -S/P PLEX as above   -Hepatology consulted- ordered Lab work recommended by them   -Continue Lactulose and Miralax  -Continue Rifaximin    #Diet  - c/w  tube feeds, hypoglycemic off of tube feeds  - will have oral diet as well     #Renal:  -No acute issues. Will monitor Cr and urine output.   Hypervolemic state  - 40 IV lasix 9/23-9/24, monitor UOP   #ROSA 9/25 - hold additional diuretics  - urine studies     #hypernatremia   - 350 q6 free water flushes    #ID:  - s/p CTX, Fever 100.9   -Continue Zosyn ( 9/23- ) end date 9/27  -Will stop Vanc    #Heme:  Concern for DIC - stable   - elevated coags, d-dimer & fibrinogen 40  - S/P 3u cryo & 1 FFP in setting of DIC and liver failure-  - FU CBC. tranfuse for Hg <7   - Fibrinogen now improved  - Total pRBC (2 - most recently 9/26)  Plan  -Will continue to monitor    #Elevated INR  -INR now improved to 1.84  Plan  -Will monitor    #anemia - chronic disease- no active bleeding   - total prbcs count 1u - 9/23    #RUE hematoma on US (9/25)   - ctm with warm packs    #DVT ppx  -Heparin 5K tid - Hold iso anemia   - SCD     #Endo:  - cont to monitor FS Q6H     Lines  -Shiley placed 9/17- removed 9/23  -Cindy placed 9/18 removed       For Followup:  [] RUE hematoma, off A/C for now   [] hepatology recs   [] wean off tube feeds   [] monitor mental status MICU Transfer Note    Transfer from: MICU    Transfer to: ( X ) Medicine    (  ) Telemetry     (   ) RCU        (    ) Palliative         (   ) Stroke Unit          (   ) Continuous Pulse ox         (   )  __________________    Accepting physician:      HOSPITAL / MICU COURSE:    60 y/o male, PMH ETOH abuse, gastritis, PUD, HLD, hx of hypoxic respiratory failure requiring intubation due to aspiration PNA (most recent intubation Aril 2020), presented to [facility] on 9/13 c/o etoh w/d, abd pain, N/V. Patient underwent an evaluation for acute cholecystitis and was treated for ETOH w/d. At OSH, LFTs began increasing, started on NAC gtt. Pt was transferred to [facility] on 9/15 for AMS 2/2 etoh w/d and evaluation of acute liver failure by Hepatology.     While in MICU, pt was on NAC gtt frtom 9/15-9/23. Per hepatology, pt is unlikely a candidate for LT due to multiple admissions in the past with alcohol withdrawal. Pt was intubated on 9/17, extubated 9/23. Pt received PLEX x3, mannitol, hypertonic saline for hyperammonemia and cerebral edema i/s/o GUNJAN. Pt also given lactulose, ammonia down to 42. Mental status improved.    Pt now stable for transfer to medicine floor.    ASSESSMENT & PLAN:   Patient is a 59 year old M with PMHx ETOH abuse with frequent admissions for withdrawal & gastritis admitted to ICU for acute on chronic decompensated liver failure & alcohol withdrawal. Patient now intubated for airway protection. Started on PLEX for Acute liver failure. Pending further evaluation regarding AMS.    # Neuro:  Acute Metabolic Encephalopathy   -Likely secondary to elevated ammonia level along with liver failure.   -CT head showing concerns for cerebral edema or global hypoxic injury  -CTA and CTV- appear to have no major findings  -MRI did not show any acute issues  -vEEG- no major findings, repeat 9/23 no seizures  - Ammonia improving     Plan - still encephopathic   - OFF sedation   -Neurology on board  -S/P PLEX- got 1st round 9/17 and 2nd 9/19, 3rd round 9/20  -Continue Lactulose and Miralax- ammonia improving   -c/w Rifaximin  - Trial Seroquel 25 BID  at PM for agitation and will keep off Precedex     #Resp:   -Patient intubated for airway protection  - Extubated 9/23  - comfortable on NC     #CV:  -Patient not on any vasopressors at this time. Hemodynamically stable.   - TTE 9/2023 - EF 65%     HTN  -On PO Labetalol 200mg TID    #GI:  // Acute Decompensated Liver Failure - patient with long history of etoh abuse with evidence of hepatic steatosis on imaging.  Appears to be related to a prior ischemic episode   - RUQ US: Distended gallbladder with wall thickening up to 5 mm and trace pericholecystic fluid without evidence of cholelithiasis or biliary dilatation. Negative sonographic Gibson's sign.   - CTAP: Nondistended gallbladder with diffuse nonspecific wall edema. Severe fatty liver.  - labs significant for thrombocytopenia, transaminitis, anemia, elevated bilirubin & decreased fibrinogen, elevated lactate.   Plan  -Continue Thiamine and Folate  - s/p NAC, currently off   -S/P PLEX as above   -Hepatology consulted- ordered Lab work recommended by them   -Continue Lactulose and Miralax  -Continue Rifaximin    #Diet  - c/w  tube feeds, hypoglycemic off of tube feeds  - will have oral diet as well     #Renal:  -No acute issues. Will monitor Cr and urine output.   Hypervolemic state  - 40 IV lasix 9/23-9/24, monitor UOP   #ROSA 9/25 - hold additional diuretics  - urine studies     #hypernatremia   - 350 q6 free water flushes    #ID:  - s/p CTX, Fever 100.9   -Continue Zosyn ( 9/23- ) end date 9/27  -Will stop Vanc    #Heme:  Concern for DIC - stable   - elevated coags, d-dimer & fibrinogen 40  - S/P 3u cryo & 1 FFP in setting of DIC and liver failure-  - FU CBC. tranfuse for Hg <7   - Fibrinogen now improved  - Total pRBC (2 - most recently 9/26)  Plan  -Will continue to monitor    #Elevated INR  -INR now improved to 1.84  Plan  -Will monitor    #anemia - chronic disease- no active bleeding   - total prbcs count 1u - 9/23    #RUE hematoma on US (9/25)   - ctm with warm packs    #DVT ppx  -Heparin 5K tid - Hold iso anemia   - SCD     #Endo:  - cont to monitor FS Q6H   - AM cortisol on admission (sent at [facility]) was low 3.0  - re-check in the AM - no signs of AI     Lines  -Shiley placed 9/17- removed 9/23  -Cindy placed 9/18 removed       For Followup:  [] RUE hematoma, off A/C for now   [] hepatology recs   [] wean off tube feeds   [] monitor mental status  [] f/u am cortisol MICU Transfer Note    Transfer from: MICU    Transfer to: ( X ) Medicine    (  ) Telemetry     (   ) RCU        (    ) Palliative         (   ) Stroke Unit          (   ) Continuous Pulse ox         (   )  __________________    Accepting physician:      HOSPITAL / MICU COURSE:    58 y/o male, PMH ETOH abuse, gastritis, PUD, HLD, hx of hypoxic respiratory failure requiring intubation due to aspiration PNA (most recent intubation Aril 2020), presented to [facility] on 9/13 c/o etoh w/d, abd pain, N/V. Patient underwent an evaluation for acute cholecystitis and was treated for ETOH w/d. At OSH, LFTs began increasing, started on NAC gtt. Pt was transferred to [facility] on 9/15 for AMS 2/2 etoh w/d and evaluation of acute liver failure by Hepatology.     While in MICU, pt was on NAC gtt frtom 9/15-9/23. Per hepatology, pt is unlikely a candidate for LT due to multiple admissions in the past with alcohol withdrawal. Pt was intubated on 9/17, extubated 9/23. Pt received PLEX x3, mannitol, hypertonic saline for hyperammonemia and cerebral edema i/s/o GUNJAN. Pt also given lactulose, ammonia down to 42. Mental status improved.    Pt now stable for transfer to medicine floor.    ASSESSMENT & PLAN:   Patient is a 59 year old M with PMHx ETOH abuse with frequent admissions for withdrawal & gastritis admitted to ICU for acute on chronic decompensated liver failure & alcohol withdrawal. Patient now intubated for airway protection. Started on PLEX for Acute liver failure. Pending further evaluation regarding AMS.    # Neuro:  Acute Metabolic Encephalopathy   -Likely secondary to elevated ammonia level along with liver failure.   -CT head showing concerns for cerebral edema or global hypoxic injury  -CTA and CTV- appear to have no major findings  -MRI did not show any acute issues  -vEEG- no major findings, repeat 9/23 no seizures  - Ammonia improving     Plan - still encephopathic   - OFF sedation   -Neurology on board  -S/P PLEX- got 1st round 9/17 and 2nd 9/19, 3rd round 9/20  -Continue Lactulose and Miralax- ammonia improving   -c/w Rifaximin  - Trial Seroquel 25 BID  at PM for agitation and will keep off Precedex     #Resp:   -Patient intubated for airway protection  - Extubated 9/23  - comfortable on NC     #CV:  -Patient not on any vasopressors at this time. Hemodynamically stable.   - TTE 9/2023 - EF 65%     HTN  -On PO Labetalol 200mg TID    #GI:  // Acute Decompensated Liver Failure - patient with long history of etoh abuse with evidence of hepatic steatosis on imaging.  Appears to be related to a prior ischemic episode   - RUQ US: Distended gallbladder with wall thickening up to 5 mm and trace pericholecystic fluid without evidence of cholelithiasis or biliary dilatation. Negative sonographic Gibson's sign.   - CTAP: Nondistended gallbladder with diffuse nonspecific wall edema. Severe fatty liver.  - labs significant for thrombocytopenia, transaminitis, anemia, elevated bilirubin & decreased fibrinogen, elevated lactate.   Plan  -Continue Thiamine and Folate  - s/p NAC, currently off   -S/P PLEX as above   -Hepatology consulted- ordered Lab work recommended by them   -Continue Lactulose and Miralax  -Continue Rifaximin    #Diet  - c/w  tube feeds, hypoglycemic off of tube feeds  - will have oral diet as well     #Renal:  -No acute issues. Will monitor Cr and urine output.   Hypervolemic state  - 40 IV lasix 9/23-9/24, monitor UOP   #ROSA 9/25 - hold additional diuretics  - urine studies     #hypernatremia   - 350 q6 free water flushes    #ID:  - s/p CTX, Fever 100.9   -Continue Zosyn ( 9/23- ) end date 9/27  -Will stop Vanc    #Heme:  Concern for DIC - stable   - elevated coags, d-dimer & fibrinogen 40  - S/P 3u cryo & 1 FFP in setting of DIC and liver failure-  - FU CBC. tranfuse for Hg <7   - Fibrinogen now improved  - Total pRBC (2 - most recently 9/26)  Plan  -Will continue to monitor    #Elevated INR  -INR now improved to 1.84  Plan  -Will monitor    #anemia - chronic disease- no active bleeding   - total prbcs count 1u - 9/23    #RUE hematoma on US (9/25)   - ctm with warm packs    #DVT ppx  -Heparin 5K tid - Hold iso anemia   - SCD     #Endo:  - cont to monitor FS Q6H   - AM cortisol on admission (sent at [facility]) was low 3.0  - re-check in the AM - no signs of AI     Lines  -Shiley placed 9/17- removed 9/23  -Cindy placed 9/18 removed       For Followup:  [] RUE hematoma, off A/C for now   [] hepatology recs - follow up with plan recommend keeping pt NPO at this time    [] monitor mental status  [] f/u am cortisol  [ ] Zosyn to be completed 9/27/24 MICU Transfer Note    Transfer from: MICU    Transfer to: ( X ) Medicine    (  ) Telemetry     (   ) RCU        (    ) Palliative         (   ) Stroke Unit          (   ) Continuous Pulse ox         (   )  __________________    Accepting physician: Dr. Salazar   Sign out:       HOSPITAL / MICU COURSE:    60 y/o male, PMH ETOH abuse, gastritis, PUD, HLD, hx of hypoxic respiratory failure requiring intubation due to aspiration PNA (most recent intubation Aril 2020), presented to [facility] on 9/13 c/o etoh w/d, abd pain, N/V. Patient underwent an evaluation for acute cholecystitis and was treated for ETOH w/d. At OSH, LFTs began increasing, started on NAC gtt. Pt was transferred to [facility] on 9/15 for AMS 2/2 etoh w/d and evaluation of acute liver failure by Hepatology.     While in MICU, pt was on NAC gtt frtom 9/15-9/23. Per hepatology, pt is unlikely a candidate for LT due to multiple admissions in the past with alcohol withdrawal. Pt was intubated on 9/17, extubated 9/23. Pt received PLEX x3, mannitol, hypertonic saline for hyperammonemia and cerebral edema i/s/o long term. Pt also given lactulose, ammonia down to 42. Mental status improved.    Pt now stable for transfer to medicine floor.    ASSESSMENT & PLAN:   Patient is a 59 year old M with PMHx ETOH abuse with frequent admissions for withdrawal & gastritis admitted to ICU for acute on chronic decompensated liver failure & alcohol withdrawal. Patient now intubated for airway protection. Started on PLEX for Acute liver failure. Pending further evaluation regarding AMS.    # Neuro:  Acute Metabolic Encephalopathy   -Likely secondary to elevated ammonia level along with liver failure.   -CT head showing concerns for cerebral edema or global hypoxic injury  -CTA and CTV- appear to have no major findings  -MRI did not show any acute issues  -vEEG- no major findings, repeat 9/23 no seizures  - Ammonia improving     Plan - still encephopathic   - OFF sedation   -Neurology on board  -S/P PLEX- got 1st round 9/17 and 2nd 9/19, 3rd round 9/20  -Continue Lactulose and Miralax- ammonia improving   -c/w Rifaximin  - Trial Seroquel 25 BID  at PM for agitation and will keep off Precedex     #Resp:   -Patient intubated for airway protection  - Extubated 9/23  - comfortable on NC     #CV:  -Patient not on any vasopressors at this time. Hemodynamically stable.   - TTE 9/2023 - EF 65%     HTN  -On PO Labetalol 200mg TID    #GI:  // Acute Decompensated Liver Failure - patient with long history of etoh abuse with evidence of hepatic steatosis on imaging.  Appears to be related to a prior ischemic episode   - RUQ US: Distended gallbladder with wall thickening up to 5 mm and trace pericholecystic fluid without evidence of cholelithiasis or biliary dilatation. Negative sonographic Gibson's sign.   - CTAP: Nondistended gallbladder with diffuse nonspecific wall edema. Severe fatty liver.  - labs significant for thrombocytopenia, transaminitis, anemia, elevated bilirubin & decreased fibrinogen, elevated lactate.   Plan  -Continue Thiamine and Folate  - s/p NAC, currently off   -S/P PLEX as above   -Hepatology consulted- ordered Lab work recommended by them   -Continue Lactulose and Miralax  -Continue Rifaximin    #Diet  - c/w  tube feeds, hypoglycemic off of tube feeds  - will have oral diet as well     #Renal:  -No acute issues. Will monitor Cr and urine output.   Hypervolemic state  - 40 IV lasix 9/23-9/24, monitor UOP   #ROSA 9/25 - hold additional diuretics  - urine studies     #hypernatremia   - 350 q6 free water flushes    #ID:  - s/p CTX, Fever 100.9   -Continue Zosyn ( 9/23- ) end date 9/27  -Will stop Vanc    #Heme:  Concern for DIC - stable   - elevated coags, d-dimer & fibrinogen 40  - S/P 3u cryo & 1 FFP in setting of DIC and liver failure-  - FU CBC. tranfuse for Hg <7   - Fibrinogen now improved  - Total pRBC (2 - most recently 9/26)  Plan  -Will continue to monitor    #Elevated INR  -INR now improved to 1.84  Plan  -Will monitor    #anemia - chronic disease- no active bleeding   - total prbcs count 1u - 9/23    #RUE hematoma on US (9/25)   - ctm with warm packs    #DVT ppx  -Heparin 5K tid - Hold iso anemia   - SCD     #Endo:  - cont to monitor FS Q6H   - AM cortisol on admission (sent at [facility]) was low 3.0  - re-check in the AM - no signs of AI     Lines  -Shiley placed 9/17- removed 9/23  -Cindy placed 9/18 removed       For Followup:  [] RUE hematoma, off A/C for now   [] hepatology recs - follow up with plan recommend keeping pt NPO at this time    [] monitor mental status  [] f/u am cortisol  [ ] Zosyn to be completed 9/27/24

## 2024-09-25 NOTE — PROGRESS NOTE ADULT - ASSESSMENT
Patient is a 59 year old M with PMHx ETOH abuse with frequent admissions for withdrawal & gastritis admitted to ICU for acute on chronic decompensated liver failure & alcohol withdrawal. Patient now intubated for airway protection. Started on PLEX for Acute liver failure. Pending further evaluation regarding AMS.    # Neuro:  Acute Metabolic Encephalopathy   -Likely secondary to elevated ammonia level along with liver failure.   -CT head showing concerns for cerebral edema or global hypoxic injury  -CTA and CTV- appear to have no major findings  -MRI did not show any acute issues  -vEEG- no major findings, repeat 9/23 no seizures  - Ammonia improving     Plan - still encephopathic   - OFF sedation   -Neurology on board  -S/P PLEX- got 1st round 9/17 and 2nd 9/19, 3rd round 9/20  -Continue Lactulose and Miralax- ammonia improving   -c/w Rifaximin  - Trial Seroquel 50 at PM for agitation and will keep off Precedex     #Resp:   -Patient intubated for airway protection  - Extubated 9/23  - comfortable on NC     #CV:  -Patient not on any vasopressors at this time. Hemodynamically stable.   - TTE 9/2023 - EF 65%     HTN  -On PO Labetalol 100mg TID    #GI:  // Acute Decompensated Liver Failure - patient with long history of etoh abuse with evidence of hepatic steatosis on imaging.  Appears to be related to a prior ischemic episode   - RUQ US: Distended gallbladder with wall thickening up to 5 mm and trace pericholecystic fluid without evidence of cholelithiasis or biliary dilatation. Negative sonographic Gibson's sign.   - CTAP: Nondistended gallbladder with diffuse nonspecific wall edema. Severe fatty liver.  - labs significant for thrombocytopenia, transaminitis, anemia, elevated bilirubin & decreased fibrinogen, elevated lactate.   Plan  -Continue Thiamine and Folate  - s/p NAC, currently off   -S/P PLEX as above   -Hepatology consulted- ordered Lab work recommended by them   -Continue Lactulose and Miralax  -Continue Rifaxamin    #Diet  - c/w  tube feeds     #Renal:  -No acute issues. Will monitor Cr and urine output.   Hypervolemic state  - 40 IV lasix 9/23-9/24, monitor UOP     #hypernatremia   - 350 q6 free water flushes, stable at 155     #ID:  - s/p CTX, Fever 100.9   -Continue Zosyn ( 9/23- )   -Will stop Vanc    #Heme:  Concern for DIC - stable   - elevated coags, d-dimer & fibrinogen 40  - S/P 3u cryo & 1 FFP in setting of DIC and liver failure-  - FU CBC. tranfuse for Hg <7   - Fibrinogen now improved  Plan  -Will continue to monitor    #Elevated INR  -INR now improved to 1.84  Plan  -Will monitor    #anemia - chronic disease- no active bleeding   - total prbcs count 1u - 9/23    #RUE hematoma on US (9/25)   - ctm     #DVT ppx  -Heparin 5K tid    #Endo:  - cont to monitor FS Q6H     Lines  -Shiley placed 9/17- removed 9/23  -Cindy placed 9/18- will remove today      Patient is a 59 year old M with PMHx ETOH abuse with frequent admissions for withdrawal & gastritis admitted to ICU for acute on chronic decompensated liver failure & alcohol withdrawal. Patient now intubated for airway protection. Started on PLEX for Acute liver failure. Pending further evaluation regarding AMS.    # Neuro:  Acute Metabolic Encephalopathy   -Likely secondary to elevated ammonia level along with liver failure.   -CT head showing concerns for cerebral edema or global hypoxic injury  -CTA and CTV- appear to have no major findings  -MRI did not show any acute issues  -vEEG- no major findings, repeat 9/23 no seizures  - Ammonia improving     Plan - still encephopathic   - OFF sedation   -Neurology on board  -S/P PLEX- got 1st round 9/17 and 2nd 9/19, 3rd round 9/20  -Continue Lactulose and Miralax- ammonia improving   -c/w Rifaximin  - Trial Seroquel 25 BID  at PM for agitation and will keep off Precedex     #Resp:   -Patient intubated for airway protection  - Extubated 9/23  - comfortable on NC     #CV:  -Patient not on any vasopressors at this time. Hemodynamically stable.   - TTE 9/2023 - EF 65%     HTN  -On PO Labetalol 100mg TID    #GI:  // Acute Decompensated Liver Failure - patient with long history of etoh abuse with evidence of hepatic steatosis on imaging.  Appears to be related to a prior ischemic episode   - RUQ US: Distended gallbladder with wall thickening up to 5 mm and trace pericholecystic fluid without evidence of cholelithiasis or biliary dilatation. Negative sonographic Gibson's sign.   - CTAP: Nondistended gallbladder with diffuse nonspecific wall edema. Severe fatty liver.  - labs significant for thrombocytopenia, transaminitis, anemia, elevated bilirubin & decreased fibrinogen, elevated lactate.   Plan  -Continue Thiamine and Folate  - s/p NAC, currently off   -S/P PLEX as above   -Hepatology consulted- ordered Lab work recommended by them   -Continue Lactulose and Miralax  -Continue Rifaximin    #Diet  - c/w  tube feeds, hypoglycemic off of tube feeds    #Renal:  -No acute issues. Will monitor Cr and urine output.   Hypervolemic state  - 40 IV lasix 9/23-9/24, monitor UOP   #ROSA 9/25 - hold additional diuretics  - urine studies     #hypernatremia   - 350 q6 free water flushes, stable at 155   - c/w 100 cc/h after    #ID:  - s/p CTX, Fever 100.9   -Continue Zosyn ( 9/23- )   -Will stop Vanc    #Heme:  Concern for DIC - stable   - elevated coags, d-dimer & fibrinogen 40  - S/P 3u cryo & 1 FFP in setting of DIC and liver failure-  - FU CBC. tranfuse for Hg <7   - Fibrinogen now improved  Plan  -Will continue to monitor    #Elevated INR  -INR now improved to 1.84  Plan  -Will monitor    #anemia - chronic disease- no active bleeding   - total prbcs count 1u - 9/23    #RUE hematoma on US (9/25)   - ctm with warm packs    #DVT ppx  -Heparin 5K tid    #Endo:  - cont to monitor FS Q6H     Lines  -Traciley placed 9/17- removed 9/23  -Cindy placed 9/18- will remove today

## 2024-09-25 NOTE — PROGRESS NOTE ADULT - SUBJECTIVE AND OBJECTIVE BOX
INTERVAL HPI/OVERNIGHT EVENTS: Overnight - Ativan given x2, Hematoma of the RUE     SUBJECTIVE: Patient seen and examined at bedside. Patient awake and alert. Unable to have meaningful conversation.     Unable to obtain ROS due to altered mentation     OBJECTIVE:    VITAL SIGNS:  ICU Vital Signs Last 24 Hrs  T(C): 36.6 (25 Sep 2024 07:00), Max: 38.1 (24 Sep 2024 20:00)  T(F): 97.9 (25 Sep 2024 07:00), Max: 100.6 (24 Sep 2024 20:00)  HR: 72 (25 Sep 2024 07:00) (69 - 84)  BP: 134/73 (25 Sep 2024 07:00) (96/62 - 157/66)  BP(mean): 96 (25 Sep 2024 07:00) (71 - 103)  ABP: --  ABP(mean): --  RR: 15 (25 Sep 2024 07:00) (12 - 23)  SpO2: 100% (25 Sep 2024 07:00) (100% - 100%)    O2 Parameters below as of 24 Sep 2024 20:00  Patient On (Oxygen Delivery Method): room air    O2 Concentration (%): 21 09-24 @ 07:01  -  09-25 @ 07:00  --------------------------------------------------------  IN: 2676.2 mL / OUT: 3550 mL / NET: -873.8 mL      CAPILLARY BLOOD GLUCOSE      POCT Blood Glucose.: 82 mg/dL (25 Sep 2024 05:16)      PHYSICAL EXAM:    General: NAD  PHYSICAL EXAM:  HEENT: NC/AT; PERRL, Sclera Icteric   Neck: supple  Respiratory: CTA b/l, intubated and sedated   Cardiovascular: +S1/S2; RRR  Abdomen: soft, NT/ND; +BS x4  Extremities: WWP, 2+ peripheral pulses b/l; B/L arm and lower extremity edema R>L. Pulses palpable  Skin: normal color and turgor; no rash  Neurological: AO1 arousble to verbal stimuli     MEDICATIONS:  MEDICATIONS  (STANDING):  chlorhexidine 4% Liquid 1 Application(s) Topical <User Schedule>  dextrose 10%. 1000 milliLiter(s) (50 mL/Hr) IV Continuous <Continuous>  folic acid Injectable 1 milliGRAM(s) IV Push daily  heparin   Injectable 5000 Unit(s) SubCutaneous every 8 hours  labetalol 100 milliGRAM(s) Oral three times a day  lactulose Syrup 30 Gram(s) Oral every 6 hours  pantoprazole  Injectable 40 milliGRAM(s) IV Push two times a day  piperacillin/tazobactam IVPB.. 3.375 Gram(s) IV Intermittent every 8 hours  QUEtiapine 25 milliGRAM(s) Oral daily  QUEtiapine 50 milliGRAM(s) Oral at bedtime  rifAXIMin 550 milliGRAM(s) Oral two times a day  thiamine 100 milliGRAM(s) Oral daily    MEDICATIONS  (PRN):  fentaNYL    Injectable 50 MICROGram(s) IV Push every 4 hours PRN Agitation      ALLERGIES:  Allergies    No Known Allergies    Intolerances    LABS:                        7.2    9.34  )-----------( 126      ( 25 Sep 2024 00:31 )             21.7     09-25    155[H]  |  125[H]  |  26[H]  ----------------------------<  78  3.6   |  18[L]  |  1.74[H]    Ca    8.5      25 Sep 2024 00:31  Phos  2.8     09-25  Mg     2.4     09-25    TPro  6.3  /  Alb  2.4[L]  /  TBili  10.3[H]  /  DBili  x   /  AST  98[H]  /  ALT  135[H]  /  AlkPhos  118  09-25    PT/INR - ( 25 Sep 2024 00:32 )   PT: 16.5 sec;   INR: 1.44 ratio         PTT - ( 25 Sep 2024 00:32 )  PTT:51.1 sec  Urinalysis Basic - ( 25 Sep 2024 00:31 )    Color: x / Appearance: x / SG: x / pH: x  Gluc: 78 mg/dL / Ketone: x  / Bili: x / Urobili: x   Blood: x / Protein: x / Nitrite: x   Leuk Esterase: x / RBC: x / WBC x   Sq Epi: x / Non Sq Epi: x / Bacteria: x      RADIOLOGY & ADDITIONAL TESTS: Reviewed.

## 2024-09-25 NOTE — PROGRESS NOTE ADULT - ATTENDING COMMENTS
Agree with above. Seen and examined with team on rounds. Critically ill with liver and kidney dysfunction in the setting of acute ETOH intoxication and withdrawal. Will check urine lytes. Add some D5 to correct free water deficit. Agitation better on Seroquel now. Will try and avoid ativan as it causes lethargy in him for long periods of time given his liver dysfunction. Continue feeds and oral diet when tolerated. OOB to chair as tolerated. Close follow up of labs and electrolytes. Supportive care.

## 2024-09-25 NOTE — EEG REPORT - NS EEG TEXT BOX
REPORT OF CONTINUOUS VIDEO EEG      [facility]: [address], [place] [address], Phone: [phone]  [facility]: [address] [place] [address], [place] [address], Phone: [phone]  [facility]: [address] [place] [address], Phone: [phone]    Patient Name: Stefan Degroot     Age: 59 year, : 1965  [place]: -[facility]    Study Date: 2024   Start Time: 8:45:38 AM      End Date:  2024        End Time: 10:13:58 AM     Study Duration: 1 h 28 minutes    Study Information:    EEG Recording Technique:  The patient underwent continuous Video-EEG monitoring, using Telemetry System hardware on the XLTek Digital System. EEG and video data were stored on a computer hard drive with important events saved in digital archive files. The material was reviewed by a physician (electroencephalographer / epileptologist) on a daily basis. Victor Hugo and seizure detection algorithms were utilized and reviewed. An EEG Technician attended to the patient, and was available throughout daytime work hours.  The epilepsy center neurologist was available in person or on call 24-hours per day.    EEG Placement and Labeling of Electrodes:  The EEG was performed utilizing 20 channel referential EEG connections (coronal over temporal over parasagittal montage) using all standard 10-20 electrode placements with EKG, with additional electrodes placed in the inferior temporal region using the modified 10-10 montage electrode placements for elective admissions, or if deemed necessary. Recording was at a sampling rate of 256 samples per second per channel. Time synchronized digital video recording was done simultaneously with EEG recording. A low light infrared camera was used for low light recording.     History: -  59 year old Male is here to rule out seizures    Medication  No Data.    Interpretation:    [[[Abbreviation Key:  PDR=alpha rhythm/posterior dominant rhythm. A-P=anterior posterior.  Amplitude: ‘very low’:<20; ‘low’:20-49; ‘medium’:; ‘high’:>150uV.  Persistence for periodic/rhythmic patterns (% of epoch) ‘rare’:<1%; ‘occasional’:1-10%; ‘frequent’:10-50%; ‘abundant’:50-90%; ‘continuous’:>90%.  Persistence for sporadic discharges: ‘rare’:<1/hr; ‘occasional’:1/min-1/hr; ‘frequent’:>1/min; ‘abundant’:>1/10 sec.  RPP=rhythmic and periodic patterns; GRDA=generalized rhythmic delta activity; FIRDA=frontal intermittent GRDA; LRDA=lateralized rhythmic delta activity; TIRDA=temporal intermittent rhythmic delta activity;  LPD=PLED=lateralized periodic discharges; GPD=generalized periodic discharges; BIPDs =bilateral independent periodic discharges; Mf=multifocal; SIRPDs=stimulus induced rhythmic, periodic, or ictal appearing discharges; BIRDs=brief potentially ictal rhythmic discharges >4 Hz, lasting .5-10s; PFA (paroxysmal bursts >13 Hz or =8 Hz <10s).  Modifiers: +F=with fast component; +S=with spike component; +R=with rhythmic component.  S-B=burst suppression pattern.  Max=maximal. N1-drowsy; N2-stage II sleep; N3-slow wave sleep. SSS/BETS=small sharp spikes/benign epileptiform transients of sleep. HV=hyperventilation; PS=photic stimulation]]]      Daily EEG Visual Analysis    FINDINGS:      Background:  Symmetry: symmetric  Continuous: continuous  PDR: symmetric, poorly-modulated 7-8 Hz activity, with amplitude to 40 uV, that attenuated to eye opening.  Low amplitude frontal beta noted in wakefulness.  Reactivity: present  Voltage: normal, [defined typically between 20-150uV]  Anterior Posterior Gradient: present  Breach: absent    Background Slowing:  Generalized slowing:  present. slow PDR  Focal slowing: none was present.    State Changes:   -Drowsiness was characterized by fragmentation, attenuation, and slowing of the background activity.      -Stage II sleep transients were not recorded.    Sporadic Epileptiform Discharges:   None    Rhythmic and Periodic Patterns (RPPs):  None     Electrographic and Electroclinical seizures:  None    Other Clinical Events:  None    Activation Procedures:   -Hyperventilation was not performed.    -Photic stimulation was not performed.      Artifacts:  Intermittent myogenic and movement artifacts were noted.    ECG:  No significant abnormality        EEG Summary / Classification:  Abnormal EEG in the awake / drowsy  states.  •	Mild diffuse background slowing    EEG Impression / Clinical Correlate:  Abnormal prolonged EEG study due to Mild diffuse cerebral dysfunction that is not specific in etiology    No epileptic discharges recorded.  No seizures recorded.  •	  The study is partly limited due to excessive artifact    ________________________________________    BILL Fuentes  Attending Physician, [facility]    ------------------------------------  EEG Reading Room: [phone]  On Call Service After Hours: [phone]

## 2024-09-26 LAB
ALBUMIN SERPL ELPH-MCNC: 2.4 G/DL — LOW (ref 3.3–5)
ALBUMIN SERPL ELPH-MCNC: 2.5 G/DL — LOW (ref 3.3–5)
ALP SERPL-CCNC: 127 U/L — HIGH (ref 40–120)
ALP SERPL-CCNC: 127 U/L — HIGH (ref 40–120)
ALT FLD-CCNC: 124 U/L — HIGH (ref 10–45)
ALT FLD-CCNC: 124 U/L — HIGH (ref 10–45)
AMMONIA BLD-MCNC: 25 UMOL/L — SIGNIFICANT CHANGE UP (ref 11–55)
ANION GAP SERPL CALC-SCNC: 10 MMOL/L — SIGNIFICANT CHANGE UP (ref 5–17)
ANION GAP SERPL CALC-SCNC: 11 MMOL/L — SIGNIFICANT CHANGE UP (ref 5–17)
ANISOCYTOSIS BLD QL: SIGNIFICANT CHANGE UP
APTT BLD: 40.8 SEC — HIGH (ref 24.5–35.6)
AST SERPL-CCNC: 100 U/L — HIGH (ref 10–40)
AST SERPL-CCNC: 94 U/L — HIGH (ref 10–40)
BASOPHILS # BLD AUTO: 0.07 K/UL — SIGNIFICANT CHANGE UP (ref 0–0.2)
BASOPHILS NFR BLD AUTO: 0.9 % — SIGNIFICANT CHANGE UP (ref 0–2)
BILIRUB SERPL-MCNC: 11.6 MG/DL — HIGH (ref 0.2–1.2)
BILIRUB SERPL-MCNC: 12.2 MG/DL — HIGH (ref 0.2–1.2)
BUN SERPL-MCNC: 17 MG/DL — SIGNIFICANT CHANGE UP (ref 7–23)
BUN SERPL-MCNC: 18 MG/DL — SIGNIFICANT CHANGE UP (ref 7–23)
BURR CELLS BLD QL SMEAR: PRESENT — SIGNIFICANT CHANGE UP
BURR CELLS BLD QL SMEAR: SLIGHT — SIGNIFICANT CHANGE UP
CALCIUM SERPL-MCNC: 7.9 MG/DL — LOW (ref 8.4–10.5)
CALCIUM SERPL-MCNC: 8 MG/DL — LOW (ref 8.4–10.5)
CHLORIDE SERPL-SCNC: 117 MMOL/L — HIGH (ref 96–108)
CHLORIDE SERPL-SCNC: 117 MMOL/L — HIGH (ref 96–108)
CO2 SERPL-SCNC: 16 MMOL/L — LOW (ref 22–31)
CO2 SERPL-SCNC: 16 MMOL/L — LOW (ref 22–31)
CORTIS AM PEAK SERPL-MCNC: 3 UG/DL — LOW (ref 6–18.4)
CREAT SERPL-MCNC: 1.47 MG/DL — HIGH (ref 0.5–1.3)
CREAT SERPL-MCNC: 1.47 MG/DL — HIGH (ref 0.5–1.3)
EGFR: 55 ML/MIN/1.73M2 — LOW
EGFR: 55 ML/MIN/1.73M2 — LOW
EOSINOPHIL # BLD AUTO: 0.22 K/UL — SIGNIFICANT CHANGE UP (ref 0–0.5)
EOSINOPHIL NFR BLD AUTO: 2.7 % — SIGNIFICANT CHANGE UP (ref 0–6)
GAS PNL BLDV: SIGNIFICANT CHANGE UP
GLUCOSE BLDC GLUCOMTR-MCNC: 154 MG/DL — HIGH (ref 70–99)
GLUCOSE BLDC GLUCOMTR-MCNC: 77 MG/DL — SIGNIFICANT CHANGE UP (ref 70–99)
GLUCOSE BLDC GLUCOMTR-MCNC: 95 MG/DL — SIGNIFICANT CHANGE UP (ref 70–99)
GLUCOSE BLDC GLUCOMTR-MCNC: 98 MG/DL — SIGNIFICANT CHANGE UP (ref 70–99)
GLUCOSE SERPL-MCNC: 110 MG/DL — HIGH (ref 70–99)
GLUCOSE SERPL-MCNC: 110 MG/DL — HIGH (ref 70–99)
HCT VFR BLD CALC: 21.1 % — LOW (ref 39–50)
HGB BLD-MCNC: 6.7 G/DL — CRITICAL LOW (ref 13–17)
INR BLD: 1.54 RATIO — HIGH (ref 0.85–1.16)
LYMPHOCYTES # BLD AUTO: 1.78 K/UL — SIGNIFICANT CHANGE UP (ref 1–3.3)
LYMPHOCYTES # BLD AUTO: 21.4 % — SIGNIFICANT CHANGE UP (ref 13–44)
MACROCYTES BLD QL: SLIGHT — SIGNIFICANT CHANGE UP
MAGNESIUM SERPL-MCNC: 2.1 MG/DL — SIGNIFICANT CHANGE UP (ref 1.6–2.6)
MAGNESIUM SERPL-MCNC: 2.2 MG/DL — SIGNIFICANT CHANGE UP (ref 1.6–2.6)
MANUAL SMEAR VERIFICATION: SIGNIFICANT CHANGE UP
MCHC RBC-ENTMCNC: 25.4 PG — LOW (ref 27–34)
MCHC RBC-ENTMCNC: 31.8 GM/DL — LOW (ref 32–36)
MCV RBC AUTO: 79.9 FL — LOW (ref 80–100)
MICROCYTES BLD QL: SLIGHT — SIGNIFICANT CHANGE UP
MONOCYTES # BLD AUTO: 0.52 K/UL — SIGNIFICANT CHANGE UP (ref 0–0.9)
MONOCYTES NFR BLD AUTO: 6.2 % — SIGNIFICANT CHANGE UP (ref 2–14)
NEUTROPHILS # BLD AUTO: 5.73 K/UL — SIGNIFICANT CHANGE UP (ref 1.8–7.4)
NEUTROPHILS NFR BLD AUTO: 68.8 % — SIGNIFICANT CHANGE UP (ref 43–77)
OVALOCYTES BLD QL SMEAR: SLIGHT — SIGNIFICANT CHANGE UP
PHOSPHATE SERPL-MCNC: 3 MG/DL — SIGNIFICANT CHANGE UP (ref 2.5–4.5)
PHOSPHATE SERPL-MCNC: 3.1 MG/DL — SIGNIFICANT CHANGE UP (ref 2.5–4.5)
PLAT MORPH BLD: NORMAL — SIGNIFICANT CHANGE UP
PLATELET # BLD AUTO: 117 K/UL — LOW (ref 150–400)
POIKILOCYTOSIS BLD QL AUTO: SIGNIFICANT CHANGE UP
POLYCHROMASIA BLD QL SMEAR: SLIGHT — SIGNIFICANT CHANGE UP
POTASSIUM SERPL-MCNC: 3.9 MMOL/L — SIGNIFICANT CHANGE UP (ref 3.5–5.3)
POTASSIUM SERPL-MCNC: 4.3 MMOL/L — SIGNIFICANT CHANGE UP (ref 3.5–5.3)
POTASSIUM SERPL-SCNC: 3.9 MMOL/L — SIGNIFICANT CHANGE UP (ref 3.5–5.3)
POTASSIUM SERPL-SCNC: 4.3 MMOL/L — SIGNIFICANT CHANGE UP (ref 3.5–5.3)
PROT SERPL-MCNC: 6.2 G/DL — SIGNIFICANT CHANGE UP (ref 6–8.3)
PROT SERPL-MCNC: 6.3 G/DL — SIGNIFICANT CHANGE UP (ref 6–8.3)
PROTHROM AB SERPL-ACNC: 17.6 SEC — HIGH (ref 9.9–13.4)
RBC # BLD: 2.64 M/UL — LOW (ref 4.2–5.8)
RBC # FLD: 26.1 % — HIGH (ref 10.3–14.5)
RBC BLD AUTO: ABNORMAL
SCHISTOCYTES BLD QL AUTO: SLIGHT — SIGNIFICANT CHANGE UP
SODIUM SERPL-SCNC: 143 MMOL/L — SIGNIFICANT CHANGE UP (ref 135–145)
SODIUM SERPL-SCNC: 144 MMOL/L — SIGNIFICANT CHANGE UP (ref 135–145)
TARGETS BLD QL SMEAR: SLIGHT — SIGNIFICANT CHANGE UP
UUN UR-MCNC: 535 MG/DL — SIGNIFICANT CHANGE UP
WBC # BLD: 8.33 K/UL — SIGNIFICANT CHANGE UP (ref 3.8–10.5)
WBC # FLD AUTO: 8.33 K/UL — SIGNIFICANT CHANGE UP (ref 3.8–10.5)

## 2024-09-26 PROCEDURE — 99233 SBSQ HOSP IP/OBS HIGH 50: CPT | Mod: GC

## 2024-09-26 RX ORDER — OLANZAPINE 2.5 MG
5 TABLET ORAL ONCE
Refills: 0 | Status: DISCONTINUED | OUTPATIENT
Start: 2024-09-26 | End: 2024-09-26

## 2024-09-26 RX ORDER — SODIUM CHLORIDE IRRIG SOLUTION 0.9 %
1000 SOLUTION, IRRIGATION IRRIGATION
Refills: 0 | Status: DISCONTINUED | OUTPATIENT
Start: 2024-09-26 | End: 2024-09-26

## 2024-09-26 RX ORDER — OLANZAPINE 2.5 MG
7.5 TABLET ORAL ONCE
Refills: 0 | Status: COMPLETED | OUTPATIENT
Start: 2024-09-26 | End: 2024-09-26

## 2024-09-26 RX ORDER — LABETALOL HYDROCHLORIDE 200 MG/1
200 TABLET, FILM COATED ORAL THREE TIMES A DAY
Refills: 0 | Status: DISCONTINUED | OUTPATIENT
Start: 2024-09-26 | End: 2024-10-07

## 2024-09-26 RX ORDER — ALCOHOL ANTISEPTIC PADS
25 PADS, MEDICATED (EA) TOPICAL ONCE
Refills: 0 | Status: COMPLETED | OUTPATIENT
Start: 2024-09-26 | End: 2024-09-26

## 2024-09-26 RX ORDER — SODIUM CHLORIDE IRRIG SOLUTION 0.9 %
1000 SOLUTION, IRRIGATION IRRIGATION
Refills: 0 | Status: DISCONTINUED | OUTPATIENT
Start: 2024-09-26 | End: 2024-09-27

## 2024-09-26 RX ORDER — LACTULOSE 10 G/15ML
30 SOLUTION ORAL; RECTAL EVERY 8 HOURS
Refills: 0 | Status: DISCONTINUED | OUTPATIENT
Start: 2024-09-26 | End: 2024-09-28

## 2024-09-26 RX ORDER — QUETIAPINE FUMARATE 50 MG/1
25 TABLET, FILM COATED ORAL ONCE
Refills: 0 | Status: COMPLETED | OUTPATIENT
Start: 2024-09-26 | End: 2024-09-26

## 2024-09-26 RX ADMIN — PANTOPRAZOLE SODIUM 40 MILLIGRAM(S): 40 TABLET, DELAYED RELEASE ORAL at 18:04

## 2024-09-26 RX ADMIN — LACTULOSE 30 GRAM(S): 10 SOLUTION ORAL; RECTAL at 06:14

## 2024-09-26 RX ADMIN — Medication 7.5 MILLIGRAM(S): at 18:11

## 2024-09-26 RX ADMIN — PIPERACILLIN SODIUM AND TAZOBACTAM SODIUM 25 GRAM(S): 12; 1.5 INJECTION, POWDER, LYOPHILIZED, FOR SOLUTION INTRAVENOUS at 22:30

## 2024-09-26 RX ADMIN — FOLIC ACID 1 MILLIGRAM(S): 1 TABLET ORAL at 12:49

## 2024-09-26 RX ADMIN — LABETALOL HYDROCHLORIDE 200 MILLIGRAM(S): 200 TABLET, FILM COATED ORAL at 22:30

## 2024-09-26 RX ADMIN — CHLORHEXIDINE GLUCONATE ORAL RINSE 1 APPLICATION(S): 1.2 SOLUTION DENTAL at 06:15

## 2024-09-26 RX ADMIN — LABETALOL HYDROCHLORIDE 100 MILLIGRAM(S): 200 TABLET, FILM COATED ORAL at 06:13

## 2024-09-26 RX ADMIN — LACTULOSE 30 GRAM(S): 10 SOLUTION ORAL; RECTAL at 01:47

## 2024-09-26 RX ADMIN — Medication 25 MILLILITER(S): at 18:16

## 2024-09-26 RX ADMIN — QUETIAPINE FUMARATE 25 MILLIGRAM(S): 50 TABLET, FILM COATED ORAL at 22:30

## 2024-09-26 RX ADMIN — LACTULOSE 30 GRAM(S): 10 SOLUTION ORAL; RECTAL at 22:30

## 2024-09-26 RX ADMIN — PIPERACILLIN SODIUM AND TAZOBACTAM SODIUM 25 GRAM(S): 12; 1.5 INJECTION, POWDER, LYOPHILIZED, FOR SOLUTION INTRAVENOUS at 14:55

## 2024-09-26 RX ADMIN — QUETIAPINE FUMARATE 25 MILLIGRAM(S): 50 TABLET, FILM COATED ORAL at 12:49

## 2024-09-26 RX ADMIN — Medication 5000 UNIT(S): at 06:14

## 2024-09-26 RX ADMIN — Medication 50 MILLILITER(S): at 18:20

## 2024-09-26 RX ADMIN — PANTOPRAZOLE SODIUM 40 MILLIGRAM(S): 40 TABLET, DELAYED RELEASE ORAL at 06:13

## 2024-09-26 RX ADMIN — QUETIAPINE FUMARATE 25 MILLIGRAM(S): 50 TABLET, FILM COATED ORAL at 11:08

## 2024-09-26 RX ADMIN — THIAMINE HYDROCHLORIDE 100 MILLIGRAM(S): 100 INJECTION, SOLUTION INTRAMUSCULAR; INTRAVENOUS at 11:08

## 2024-09-26 RX ADMIN — PIPERACILLIN SODIUM AND TAZOBACTAM SODIUM 25 GRAM(S): 12; 1.5 INJECTION, POWDER, LYOPHILIZED, FOR SOLUTION INTRAVENOUS at 06:13

## 2024-09-26 RX ADMIN — Medication 2 MILLIGRAM(S): at 12:30

## 2024-09-26 RX ADMIN — Medication 100 MILLILITER(S): at 06:13

## 2024-09-26 NOTE — PROGRESS NOTE ADULT - ASSESSMENT
Patient is a 59 year old M with PMHx ETOH abuse with frequent admissions for withdrawal & gastritis admitted to ICU for acute on chronic decompensated liver failure & alcohol withdrawal. Patient now intubated for airway protection. Started on PLEX for Acute liver failure. Pending further evaluation regarding AMS.    # Neuro:  Acute Metabolic Encephalopathy   -Likely secondary to elevated ammonia level along with liver failure.   -CT head showing concerns for cerebral edema or global hypoxic injury  -CTA and CTV- appear to have no major findings  -MRI did not show any acute issues  -vEEG- no major findings, repeat 9/23 no seizures  - Ammonia improving     Plan - still encephopathic   - OFF sedation   -Neurology on board  -S/P PLEX- got 1st round 9/17 and 2nd 9/19, 3rd round 9/20  -Continue Lactulose and Miralax- ammonia improving   -c/w Rifaximin  - Trial Seroquel 25 BID  at PM for agitation and will keep off Precedex     #Resp:   -Patient intubated for airway protection  - Extubated 9/23  - comfortable on NC     #CV:  -Patient not on any vasopressors at this time. Hemodynamically stable.   - TTE 9/2023 - EF 65%     HTN  -On PO Labetalol 100mg TID    #GI:  // Acute Decompensated Liver Failure - patient with long history of etoh abuse with evidence of hepatic steatosis on imaging.  Appears to be related to a prior ischemic episode   - RUQ US: Distended gallbladder with wall thickening up to 5 mm and trace pericholecystic fluid without evidence of cholelithiasis or biliary dilatation. Negative sonographic Gibson's sign.   - CTAP: Nondistended gallbladder with diffuse nonspecific wall edema. Severe fatty liver.  - labs significant for thrombocytopenia, transaminitis, anemia, elevated bilirubin & decreased fibrinogen, elevated lactate.   Plan  -Continue Thiamine and Folate  - s/p NAC, currently off   -S/P PLEX as above   -Hepatology consulted- ordered Lab work recommended by them   -Continue Lactulose and Miralax  -Continue Rifaximin    #Diet  - c/w  tube feeds, hypoglycemic off of tube feeds    #Renal:  -No acute issues. Will monitor Cr and urine output.   Hypervolemic state  - 40 IV lasix 9/23-9/24, monitor UOP   #ROSA 9/25 - hold additional diuretics  - urine studies     #hypernatremia   - 350 q6 free water flushes, stable at 155   - c/w 100 cc/h after    #ID:  - s/p CTX, Fever 100.9   -Continue Zosyn ( 9/23- )   -Will stop Vanc    #Heme:  Concern for DIC - stable   - elevated coags, d-dimer & fibrinogen 40  - S/P 3u cryo & 1 FFP in setting of DIC and liver failure-  - FU CBC. tranfuse for Hg <7   - Fibrinogen now improved  Plan  -Will continue to monitor    #Elevated INR  -INR now improved to 1.84  Plan  -Will monitor    #anemia - chronic disease- no active bleeding   - total prbcs count 1u - 9/23    #RUE hematoma on US (9/25)   - ctm with warm packs    #DVT ppx  -Heparin 5K tid    #Endo:  - cont to monitor FS Q6H     Lines  -[place]ac[place] placed 9/17- removed 9/23  -Cindy placed 9/18- will remove today      Patient is a 59 year old M with PMHx ETOH abuse with frequent admissions for withdrawal & gastritis admitted to ICU for acute on chronic decompensated liver failure & alcohol withdrawal. Patient now intubated for airway protection. Started on PLEX for Acute liver failure. Pending further evaluation regarding AMS.    # Neuro:  Acute Metabolic Encephalopathy   -Likely secondary to elevated ammonia level along with liver failure.   -CT head showing concerns for cerebral edema or global hypoxic injury  -CTA and CTV- appear to have no major findings  -MRI did not show any acute issues  -vEEG- no major findings, repeat 9/23 no seizures  - Ammonia improving     Plan - still encephopathic   - OFF sedation   -Neurology on board  -S/P PLEX- got 1st round 9/17 and 2nd 9/19, 3rd round 9/20  -Continue Lactulose and Miralax- ammonia improving   -c/w Rifaximin  - Trial Seroquel 25 BID  at PM for agitation and will keep off Precedex     #Resp:   -Patient intubated for airway protection  - Extubated 9/23  - comfortable on NC     #CV:  -Patient not on any vasopressors at this time. Hemodynamically stable.   - TTE 9/2023 - EF 65%     HTN  -On PO Labetalol 100mg TID    #GI:  // Acute Decompensated Liver Failure - patient with long history of etoh abuse with evidence of hepatic steatosis on imaging.  Appears to be related to a prior ischemic episode   - RUQ US: Distended gallbladder with wall thickening up to 5 mm and trace pericholecystic fluid without evidence of cholelithiasis or biliary dilatation. Negative sonographic Gibson's sign.   - CTAP: Nondistended gallbladder with diffuse nonspecific wall edema. Severe fatty liver.  - labs significant for thrombocytopenia, transaminitis, anemia, elevated bilirubin & decreased fibrinogen, elevated lactate.   Plan  -Continue Thiamine and Folate  - s/p NAC, currently off   -S/P PLEX as above   -Hepatology consulted- ordered Lab work recommended by them   -Continue Lactulose and Miralax  -Continue Rifaximin    #Diet  - c/w  tube feeds, hypoglycemic off of tube feeds    #Renal:  -No acute issues. Will monitor Cr and urine output.   Hypervolemic state  - 40 IV lasix 9/23-9/24, monitor UOP   #ROSA 9/25 - hold additional diuretics  - urine studies     #hypernatremia   - 350 q6 free water flushes, stable at 155   - c/w 100 cc/h after    #ID:  - s/p CTX, Fever 100.9   -Continue Zosyn ( 9/23- )   -Will stop Vanc    #Heme:  Concern for DIC - stable   - elevated coags, d-dimer & fibrinogen 40  - S/P 3u cryo & 1 FFP in setting of DIC and liver failure-  - FU CBC. tranfuse for Hg <7   - Fibrinogen now improved  - Total pRBC (2 - most recently 9/26)  Plan  -Will continue to monitor    #Elevated INR  -INR now improved to 1.84  Plan  -Will monitor    #anemia - chronic disease- no active bleeding   - total prbcs count 1u - 9/23    #RUE hematoma on US (9/25)   - ctm with warm packs    #DVT ppx  -Heparin 5K tid - Hold     #Endo:  - cont to monitor FS Q6H     Lines  -Shiley placed 9/17- removed 9/23  -[place] placed 9/18- will remove today      Patient is a 59 year old M with PMHx ETOH abuse with frequent admissions for withdrawal & gastritis admitted to ICU for acute on chronic decompensated liver failure & alcohol withdrawal. Patient now intubated for airway protection. Started on PLEX for Acute liver failure. Pending further evaluation regarding AMS.    # Neuro:  Acute Metabolic Encephalopathy   -Likely secondary to elevated ammonia level along with liver failure.   -CT head showing concerns for cerebral edema or global hypoxic injury  -CTA and CTV- appear to have no major findings  -MRI did not show any acute issues  -vEEG- no major findings, repeat 9/23 no seizures  - Ammonia improving     Plan - still encephopathic   - OFF sedation   -Neurology on board  -S/P PLEX- got 1st round 9/17 and 2nd 9/19, 3rd round 9/20  -Continue Lactulose and Miralax- ammonia improving   -c/w Rifaximin  - Trial Seroquel 25 BID  at PM for agitation and will keep off Precedex     #Resp:   -Patient intubated for airway protection  - Extubated 9/23  - comfortable on NC     #CV:  -Patient not on any vasopressors at this time. Hemodynamically stable.   - TTE 9/2023 - EF 65%     HTN  -On PO Labetalol 200mg TID    #GI:  // Acute Decompensated Liver Failure - patient with long history of etoh abuse with evidence of hepatic steatosis on imaging.  Appears to be related to a prior ischemic episode   - RUQ US: Distended gallbladder with wall thickening up to 5 mm and trace pericholecystic fluid without evidence of cholelithiasis or biliary dilatation. Negative sonographic Gibson's sign.   - CTAP: Nondistended gallbladder with diffuse nonspecific wall edema. Severe fatty liver.  - labs significant for thrombocytopenia, transaminitis, anemia, elevated bilirubin & decreased fibrinogen, elevated lactate.   Plan  -Continue Thiamine and Folate  - s/p NAC, currently off   -S/P PLEX as above   -Hepatology consulted- ordered Lab work recommended by them   -Continue Lactulose and Miralax  -Continue Rifaximin    #Diet  - c/w  tube feeds, hypoglycemic off of tube feeds  - will have oral diet as well     #Renal:  -No acute issues. Will monitor Cr and urine output.   Hypervolemic state  - 40 IV lasix 9/23-9/24, monitor UOP   #ROSA 9/25 - hold additional diuretics  - urine studies     #hypernatremia   - 350 q6 free water flushes    #ID:  - s/p CTX, Fever 100.9   -Continue Zosyn ( 9/23- ) end date 9/27  -Will stop Vanc    #Heme:  Concern for DIC - stable   - elevated coags, d-dimer & fibrinogen 40  - S/P 3u cryo & 1 FFP in setting of DIC and liver failure-  - FU CBC. tranfuse for Hg <7   - Fibrinogen now improved  - Total pRBC (2 - most recently 9/26)  Plan  -Will continue to monitor    #Elevated INR  -INR now improved to 1.84  Plan  -Will monitor    #anemia - chronic disease- no active bleeding   - total prbcs count 1u - 9/23    #RUE hematoma on US (9/25)   - ctm with warm packs    #DVT ppx  -Heparin 5K tid - Hold iso anemia   - SCD     #Endo:  - cont to monitor FS Q6H     Lines  -Shiley placed 9/17- removed 9/23  -[place] placed 9/18 removed

## 2024-09-26 NOTE — PROGRESS NOTE ADULT - SUBJECTIVE AND OBJECTIVE BOX
Patient with pmh significant for Fatty liver dx, alcohol use disorder with multiple adm in the past for DTs, gastritis referred from OSH for hypotension and concern for senior care 2/2 suspected tylenol overdose complicated with cerebral edema (req intubation) s/p mannitol, PLEX on 18.19 and 20th. He is s/p extubation 09/23. Adm with hb of 8.7, baseline 14 in 08/2024 with no c/o melena or hematemesis. Low MCV.     Interval hx    Patient seen and evaluated today  Awake and alert.  Hb downtrended to 6.7  per nursing staff - stool is brown and no melena.         OBJECTIVE:    VITAL SIGNS:  ICU Vital Signs Last 24 Hrs  T(C): 36.5 (26 Sep 2024 16:00), Max: 37.4 (25 Sep 2024 20:00)  T(F): 97.7 (26 Sep 2024 16:00), Max: 99.4 (25 Sep 2024 20:00)  HR: 71 (26 Sep 2024 17:00) (68 - 80)  BP: 138/63 (26 Sep 2024 17:00) (125/61 - 168/77)  BP(mean): 91 (26 Sep 2024 17:00) (88 - 110)  ABP: --  ABP(mean): --  RR: 13 (26 Sep 2024 17:00) (13 - 28)  SpO2: 100% (26 Sep 2024 17:00) (95% - 100%)    O2 Parameters below as of 26 Sep 2024 08:00  Patient On (Oxygen Delivery Method): room air              09-25 @ 07:01  -  09-26 @ 07:00  --------------------------------------------------------  IN: 5040 mL / OUT: 1775 mL / NET: 3265 mL    09-26 @ 07:01  -  09-26 @ 18:01  --------------------------------------------------------  IN: 350 mL / OUT: 1000 mL / NET: -650 mL        PHYSICAL EXAM:    General: NAD  HEENT: NC/AT  Neck: supple  Respiratory: Regular RR, no increase in WOB  Cardiovascular: RRR  Abdomen: soft, NT/ND; +BS x4  Extremities: WWP, 2+ peripheral pulses b/l  Skin: normal color and turgor; no rash  Neurological: A&OX    MEDICATIONS:  MEDICATIONS  (STANDING):  chlorhexidine 4% Liquid 1 Application(s) Topical <User Schedule>  folic acid Injectable 1 milliGRAM(s) IV Push daily  labetalol 200 milliGRAM(s) Oral three times a day  lactulose Syrup 30 Gram(s) Oral every 8 hours  pantoprazole  Injectable 40 milliGRAM(s) IV Push two times a day  piperacillin/tazobactam IVPB.. 3.375 Gram(s) IV Intermittent every 8 hours  QUEtiapine 25 milliGRAM(s) Oral at bedtime  QUEtiapine 25 milliGRAM(s) Oral daily  rifAXIMin 550 milliGRAM(s) Oral two times a day  thiamine 100 milliGRAM(s) Oral daily    MEDICATIONS  (PRN):      ALLERGIES:  Allergies    No Known Allergies    Intolerances        LABS:                        6.7    8.33  )-----------( 117      ( 26 Sep 2024 01:38 )             21.1     09-26    143  |  117[H]  |  17  ----------------------------<  110[H]  3.9   |  16[L]  |  1.47[H]    Ca    8.0[L]      26 Sep 2024 01:38  Phos  3.0     09-26  Mg     2.1     09-26    TPro  6.3  /  Alb  2.4[L]  /  TBili  12.2[H]  /  DBili  x   /  AST  94[H]  /  ALT  124[H]  /  AlkPhos  127[H]  09-26    PT/INR - ( 26 Sep 2024 00:34 )   PT: 17.6 sec;   INR: 1.54 ratio         PTT - ( 26 Sep 2024 00:34 )  PTT:40.8 sec  Urinalysis Basic - ( 26 Sep 2024 01:38 )    Color: x / Appearance: x / SG: x / pH: x  Gluc: 110 mg/dL / Ketone: x  / Bili: x / Urobili: x   Blood: x / Protein: x / Nitrite: x   Leuk Esterase: x / RBC: x / WBC x   Sq Epi: x / Non Sq Epi: x / Bacteria: x

## 2024-09-26 NOTE — PROGRESS NOTE ADULT - ATTENDING COMMENTS
Agree with above. Seen and examined with team on rounds. ETOH withdrawal syndrome with alcohol related liver dysfunction. Improved mental status. OOB to chair today. Continue Rifaximin and lactulose. Off abx tomorrow.

## 2024-09-26 NOTE — PROGRESS NOTE ADULT - SUBJECTIVE AND OBJECTIVE BOX
INTERVAL HPI/OVERNIGHT EVENTS:    SUBJECTIVE: Patient seen and examined at bedside.     CONSTITUTIONAL: No weakness, fevers or chills  EYES/ENT: No visual changes;  No vertigo or throat pain   NECK: No pain or stiffness  RESPIRATORY: No cough, wheezing, hemoptysis; No shortness of breath  CARDIOVASCULAR: No chest pain or palpitations  GASTROINTESTINAL: No abdominal or epigastric pain. No nausea, vomiting, or hematemesis; No diarrhea or constipation. No melena or hematochezia.  GENITOURINARY: No dysuria, frequency or hematuria  NEUROLOGICAL: No numbness or weakness  SKIN: No itching, rashes    OBJECTIVE:    VITAL SIGNS:  ICU Vital Signs Last 24 Hrs  T(C): 36.8 (26 Sep 2024 04:00), Max: 37.4 (25 Sep 2024 20:00)  T(F): 98.3 (26 Sep 2024 04:00), Max: 99.4 (25 Sep 2024 20:00)  HR: 69 (26 Sep 2024 06:00) (69 - 80)  BP: 165/71 (26 Sep 2024 06:00) (113/53 - 168/76)  BP(mean): 102 (26 Sep 2024 06:00) (83 - 109)  ABP: --  ABP(mean): --  RR: 14 (26 Sep 2024 06:00) (14 - 25)  SpO2: 100% (26 Sep 2024 06:00) (91% - 100%)    O2 Parameters below as of 25 Sep 2024 20:00  Patient On (Oxygen Delivery Method): room air    O2 Concentration (%): 21 09-25 @ 07:01  -  09-26 @ 07:00  --------------------------------------------------------  IN: 4990 mL / OUT: 1775 mL / NET: 3215 mL      CAPILLARY BLOOD GLUCOSE      POCT Blood Glucose.: 95 mg/dL (26 Sep 2024 06:11)      PHYSICAL EXAM:    General: NAD  HEENT: NC/AT; PERRL, clear conjunctiva  Neck: supple  Respiratory: CTA b/l  Cardiovascular: +S1/S2; RRR  Abdomen: soft, NT/ND; +BS x4  Extremities: WWP, 2+ peripheral pulses b/l; no LE edema  Skin: normal color and turgor; no rash  Neurological:    MEDICATIONS:  MEDICATIONS  (STANDING):  chlorhexidine 4% Liquid 1 Application(s) Topical <User Schedule>  dextrose 5%. 1000 milliLiter(s) (100 mL/Hr) IV Continuous <Continuous>  folic acid Injectable 1 milliGRAM(s) IV Push daily  heparin   Injectable 5000 Unit(s) SubCutaneous every 8 hours  labetalol 100 milliGRAM(s) Oral three times a day  lactulose Syrup 30 Gram(s) Oral every 8 hours  pantoprazole  Injectable 40 milliGRAM(s) IV Push two times a day  piperacillin/tazobactam IVPB.. 3.375 Gram(s) IV Intermittent every 8 hours  QUEtiapine 25 milliGRAM(s) Oral at bedtime  QUEtiapine 25 milliGRAM(s) Oral daily  rifAXIMin 550 milliGRAM(s) Oral two times a day  thiamine 100 milliGRAM(s) Oral daily    MEDICATIONS  (PRN):      ALLERGIES:  Allergies    No Known Allergies    Intolerances        LABS:                        6.7    8.33  )-----------( 117      ( 26 Sep 2024 01:38 )             21.1     09-26    143  |  117[H]  |  17  ----------------------------<  110[H]  3.9   |  16[L]  |  1.47[H]    Ca    8.0[L]      26 Sep 2024 01:38  Phos  3.0     09-26  Mg     2.1     09-26    TPro  6.3  /  Alb  2.4[L]  /  TBili  12.2[H]  /  DBili  x   /  AST  94[H]  /  ALT  124[H]  /  AlkPhos  127[H]  09-26    PT/INR - ( 26 Sep 2024 00:34 )   PT: 17.6 sec;   INR: 1.54 ratio         PTT - ( 26 Sep 2024 00:34 )  PTT:40.8 sec  Urinalysis Basic - ( 26 Sep 2024 01:38 )    Color: x / Appearance: x / SG: x / pH: x  Gluc: 110 mg/dL / Ketone: x  / Bili: x / Urobili: x   Blood: x / Protein: x / Nitrite: x   Leuk Esterase: x / RBC: x / WBC x   Sq Epi: x / Non Sq Epi: x / Bacteria: x        RADIOLOGY & ADDITIONAL TESTS: Reviewed. INTERVAL HPI/OVERNIGHT EVENTS: Patient was active overnight, no interventions given. Hemoglobin dropped to 6.7 given 1u prbcs     SUBJECTIVE: Patient seen and examined at bedside. Awake and alert, appears calm. Patient denies fever, chills, nausea, vomitting, chest pain, shortness of breath, abdominal pain, diarrhea or constipation.     OBJECTIVE:    VITAL SIGNS:  ICU Vital Signs Last 24 Hrs  T(C): 36.8 (26 Sep 2024 04:00), Max: 37.4 (25 Sep 2024 20:00)  T(F): 98.3 (26 Sep 2024 04:00), Max: 99.4 (25 Sep 2024 20:00)  HR: 69 (26 Sep 2024 06:00) (69 - 80)  BP: 165/71 (26 Sep 2024 06:00) (113/53 - 168/76)  BP(mean): 102 (26 Sep 2024 06:00) (83 - 109)  ABP: --  ABP(mean): --  RR: 14 (26 Sep 2024 06:00) (14 - 25)  SpO2: 100% (26 Sep 2024 06:00) (91% - 100%)    O2 Parameters below as of 25 Sep 2024 20:00  Patient On (Oxygen Delivery Method): room air    O2 Concentration (%): 21 09-25 @ 07:01  -  09-26 @ 07:00  --------------------------------------------------------  IN: 4990 mL / OUT: 1775 mL / NET: 3215 mL      CAPILLARY BLOOD GLUCOSE      POCT Blood Glucose.: 95 mg/dL (26 Sep 2024 06:11)      PHYSICAL EXAM:    PHYSICAL EXAM:  HEENT: NC/AT; PERRL, Sclera Icteric   Neck: supple  Respiratory: CTA b/l, intubated and sedated   Cardiovascular: +S1/S2; RRR  Abdomen: soft, NT/ND; +BS x4  Extremities: WWP, 2+ peripheral pulses b/l; B/L arm and lower extremity edema R>L. Pulses palpable  Skin: normal color and turgor; no rash  Neurological: AO1 arousble to verbal stimuli     MEDICATIONS:  MEDICATIONS  (STANDING):  chlorhexidine 4% Liquid 1 Application(s) Topical <User Schedule>  dextrose 5%. 1000 milliLiter(s) (100 mL/Hr) IV Continuous <Continuous>  folic acid Injectable 1 milliGRAM(s) IV Push daily  heparin   Injectable 5000 Unit(s) SubCutaneous every 8 hours  labetalol 100 milliGRAM(s) Oral three times a day  lactulose Syrup 30 Gram(s) Oral every 8 hours  pantoprazole  Injectable 40 milliGRAM(s) IV Push two times a day  piperacillin/tazobactam IVPB.. 3.375 Gram(s) IV Intermittent every 8 hours  QUEtiapine 25 milliGRAM(s) Oral at bedtime  QUEtiapine 25 milliGRAM(s) Oral daily  rifAXIMin 550 milliGRAM(s) Oral two times a day  thiamine 100 milliGRAM(s) Oral daily    MEDICATIONS  (PRN):      ALLERGIES:  Allergies    No Known Allergies    Intolerances        LABS:                        6.7    8.33  )-----------( 117      ( 26 Sep 2024 01:38 )             21.1     09-26    143  |  117[H]  |  17  ----------------------------<  110[H]  3.9   |  16[L]  |  1.47[H]    Ca    8.0[L]      26 Sep 2024 01:38  Phos  3.0     09-26  Mg     2.1     09-26    TPro  6.3  /  Alb  2.4[L]  /  TBili  12.2[H]  /  DBili  x   /  AST  94[H]  /  ALT  124[H]  /  AlkPhos  127[H]  09-26    PT/INR - ( 26 Sep 2024 00:34 )   PT: 17.6 sec;   INR: 1.54 ratio         PTT - ( 26 Sep 2024 00:34 )  PTT:40.8 sec  Urinalysis Basic - ( 26 Sep 2024 01:38 )    Color: x / Appearance: x / SG: x / pH: x  Gluc: 110 mg/dL / Ketone: x  / Bili: x / Urobili: x   Blood: x / Protein: x / Nitrite: x   Leuk Esterase: x / RBC: x / WBC x   Sq Epi: x / Non Sq Epi: x / Bacteria: x        RADIOLOGY & ADDITIONAL TESTS: Reviewed. INTERVAL HPI/OVERNIGHT EVENTS: Patient was active overnight, no interventions given. Hemoglobin dropped to 6.7 given 1u prbcs     SUBJECTIVE: Patient seen and examined at bedside. Awake and alert, appears calm. Patient denies fever, chills, nausea, vomitting, chest pain, shortness of breath, abdominal pain, diarrhea or constipation.     OBJECTIVE:    VITAL SIGNS:  ICU Vital Signs Last 24 Hrs  T(C): 36.8 (26 Sep 2024 04:00), Max: 37.4 (25 Sep 2024 20:00)  T(F): 98.3 (26 Sep 2024 04:00), Max: 99.4 (25 Sep 2024 20:00)  HR: 69 (26 Sep 2024 06:00) (69 - 80)  BP: 165/71 (26 Sep 2024 06:00) (113/53 - 168/76)  BP(mean): 102 (26 Sep 2024 06:00) (83 - 109)  ABP: --  ABP(mean): --  RR: 14 (26 Sep 2024 06:00) (14 - 25)  SpO2: 100% (26 Sep 2024 06:00) (91% - 100%)    O2 Parameters below as of 25 Sep 2024 20:00  Patient On (Oxygen Delivery Method): room air    O2 Concentration (%): 21 09-25 @ 07:01  -  09-26 @ 07:00  --------------------------------------------------------  IN: 4990 mL / OUT: 1775 mL / NET: 3215 mL      CAPILLARY BLOOD GLUCOSE      POCT Blood Glucose.: 95 mg/dL (26 Sep 2024 06:11)      PHYSICAL EXAM:    PHYSICAL EXAM:  HEENT: NC/AT; PERRL, Sclera Icteric   Neck: supple  Respiratory: CTA b/l, intubated and sedated   Cardiovascular: +S1/S2; RRR  Abdomen: soft, NT/ND; +BS x4  Extremities: WWP, 2+ peripheral pulses b/l; B/L arm and lower extremity edema R>L. Pulses palpable  Skin: normal color and turgor; no rash  Neurological: AO1 arouses to verbal stimuli     MEDICATIONS:  MEDICATIONS  (STANDING):  chlorhexidine 4% Liquid 1 Application(s) Topical <User Schedule>  dextrose 5%. 1000 milliLiter(s) (100 mL/Hr) IV Continuous <Continuous>  folic acid Injectable 1 milliGRAM(s) IV Push daily  heparin   Injectable 5000 Unit(s) SubCutaneous every 8 hours  labetalol 100 milliGRAM(s) Oral three times a day  lactulose Syrup 30 Gram(s) Oral every 8 hours  pantoprazole  Injectable 40 milliGRAM(s) IV Push two times a day  piperacillin/tazobactam IVPB.. 3.375 Gram(s) IV Intermittent every 8 hours  QUEtiapine 25 milliGRAM(s) Oral at bedtime  QUEtiapine 25 milliGRAM(s) Oral daily  rifAXIMin 550 milliGRAM(s) Oral two times a day  thiamine 100 milliGRAM(s) Oral daily    MEDICATIONS  (PRN):      ALLERGIES:  Allergies    No Known Allergies    Intolerances        LABS:                        6.7    8.33  )-----------( 117      ( 26 Sep 2024 01:38 )             21.1     09-26    143  |  117[H]  |  17  ----------------------------<  110[H]  3.9   |  16[L]  |  1.47[H]    Ca    8.0[L]      26 Sep 2024 01:38  Phos  3.0     09-26  Mg     2.1     09-26    TPro  6.3  /  Alb  2.4[L]  /  TBili  12.2[H]  /  DBili  x   /  AST  94[H]  /  ALT  124[H]  /  AlkPhos  127[H]  09-26    PT/INR - ( 26 Sep 2024 00:34 )   PT: 17.6 sec;   INR: 1.54 ratio         PTT - ( 26 Sep 2024 00:34 )  PTT:40.8 sec  Urinalysis Basic - ( 26 Sep 2024 01:38 )    Color: x / Appearance: x / SG: x / pH: x  Gluc: 110 mg/dL / Ketone: x  / Bili: x / Urobili: x   Blood: x / Protein: x / Nitrite: x   Leuk Esterase: x / RBC: x / WBC x   Sq Epi: x / Non Sq Epi: x / Bacteria: x        RADIOLOGY & ADDITIONAL TESTS: Reviewed.

## 2024-09-26 NOTE — CHART NOTE - NSCHARTNOTEFT_GEN_A_CORE
60 y/o male, PMH ETOH abuse, gastritis, PUD, HLD, hx of hypoxic respiratory failure requiring intubation due to aspiration PNA (most recent intubation Aril 2020), presented to [facility] on 9/13 c/o etoh w/d, abd pain, N/V. Patient underwent an evaluation for acute cholecystitis and was treated for ETOH w/d. At OSH, LFTs began increasing, started on NAC gtt. Pt was transferred to [facility] on 9/15 for AMS 2/2 etoh w/d and evaluation of acute liver failure by Hepatology.     While in MICU, pt was on NAC gtt frtom 9/15-9/23. Per hepatology, pt is unlikely a candidate for LT due to multiple admissions in the past with alcohol withdrawal. Pt was intubated on 9/17, extubated 9/23. Pt received PLEX x3, mannitol, hypertonic saline for hyperammonemia and cerebral edema i/s/o skilled nursing. Pt also given lactulose, ammonia down to 42. Mental status improved.    Pt now stable for transfer to medicine floor.    For Followup:  [] RUE hematoma, off A/C for now   [] hepatology recs - follow up with plan recommend keeping pt NPO at this time    [] monitor mental status  [] f/u am cortisol  [ ] Zosyn to be completed 9/27/24.

## 2024-09-26 NOTE — PROGRESS NOTE ADULT - ASSESSMENT
59 years old male with h/o chronic ETOH abuse and dependence (1 bottle of Vodka a day) with long standing hx of alcohol withdrawal and DTs with frequent hospital/ICU admissions, alcoholic gastritis/esophagitis, PUD, HLD, hx of hypoxic respiratory failure requiring intubation due to aspiration PNA (most recent intubation Aril 2020),  staph PNA, COVID-19 infection Dec 2020 presented with abd pain at the OSH.    #Anemia  Differential - gastritis, PUD, AVM/Angiodysplasia, r/o malignancy  #Iron deficiency  #fatty liver disease  #Altered mental status/Encephalopathy, improved  #ROSA  #Acute liver failure vs acute on chronic --   background h/o severe fatty liver presenting with encephalopathy req intubation), markely elevated liver enzymes which has shown remarkable improvement  - Calculated MELD3 score on 9/16 34--->9/23 25, 09/26 27  - Presented with AMS at OSH. Had multiple hospitalizations in the past for alcohol withdrawal and DT per records. Unclear last alcohol drink. His alcohol level this admission was <10 and Tylenol level was 42 on admission. Intubated with findings of cerebral edema and concern for GUNJAN. Now extubated on 9/23 with improvement in mental status s/p Plexx3, mannitol and hypertonic saline   - Could be due to Tylenol use in the setting of underlying liver disease causing acute elevation in his liver enzymes. However, could also be due to ischemia as he was hypotensive at OSH, concerned for possible sepsis. GB distended concerning for acute cholecystitis, no other sources of infection, MRSA pcr pos, neg bcx.  - Other lab serologies: CMV, EBV PCR neg, Hep B/C neg.     Recommendations:   - Transfuse to keep hbg >7  - CMP, phos, INR BID vs daily   - Ct electrolyte repletion   - Keep MAP >65   - Patient will benefit from EGD and colonoscopy due to RANJAN.   - No source of GIB for now, plesae transfuse and monitor hemoglobin  - Please keep npo for now  - If hemoglobin continues to down trend, will plan for EGD and colonoscopy  - Patient will need to get Fibroscan outpatient  - Outpatient hepatology follow up  - Continue with lactulose enema, goal 2-3 BMs per day.       Liver transplant - Patient is unlikely a candidate for LT due to multiple admissions in the past with alcohol withdrawal.     The above is not finalized until attending' s attestation      Onyinye Ugonabo, PGY4  Gastroenterology and Hepatology  Available on TEAMS    For non urgent consult, please email [email or web address] (For Samira) or [email or web address] (For GISELA)  Long range page number [phone]. Short range [phone]       59 years old male with h/o chronic ETOH abuse and dependence (1 bottle of Vodka a day) with long standing hx of alcohol withdrawal and DTs with frequent hospital/ICU admissions, alcoholic gastritis/esophagitis, PUD, HLD, hx of hypoxic respiratory failure requiring intubation due to aspiration PNA (most recent intubation Aril 2020),  staph PNA, COVID-19 infection Dec 2020 presented with abd pain at the OSH.    #Anemia  Differential - gastritis, PUD, AVM/Angiodysplasia, r/o malignancy  #Iron deficiency  #fatty liver disease  #Altered mental status/Encephalopathy, improved  #ROSA  #Acute liver failure vs acute on chronic --   background h/o severe fatty liver presenting with encephalopathy req intubation), markely elevated liver enzymes which has shown remarkable improvement  - Calculated MELD3 score on 9/16 34--->9/23 25, 09/26 27  - Presented with AMS at OSH. Had multiple hospitalizations in the past for alcohol withdrawal and DT per records. Unclear last alcohol drink. His alcohol level this admission was <10 and Tylenol level was 42 on admission. Intubated with findings of cerebral edema and concern for GUNJAN. Now extubated on 9/23 with improvement in mental status s/p Plexx3, mannitol and hypertonic saline   - Could be due to Tylenol use in the setting of underlying liver disease causing acute elevation in his liver enzymes. However, could also be due to ischemia as he was hypotensive at OSH, concerned for possible sepsis. GB distended concerning for acute cholecystitis, no other sources of infection, MRSA pcr pos, neg bcx.  - Other lab serologies: CMV, EBV PCR neg, Hep B/C neg.     Recommendations:   - Transfuse to keep hbg >7  - CMP, phos, INR BID vs daily   - Ct electrolyte repletion   - Keep MAP >65   - Patient will benefit from EGD and colonoscopy due to RANJAN.   - No source of GIB for now, please transfuse and monitor hemoglobin.   - keep NPO for now  - If hemoglobin continues to down trend, will plan for EGD and colonoscopy  - Patient will need to get Fibroscan outpatient  - Outpatient hepatology follow up  - Continue with lactulose enema, goal 2-3 BMs per day.       Liver transplant - Patient is unlikely a candidate for LT due to multiple admissions in the past with alcohol withdrawal.     The above is not finalized until attending' s attestation      Griselda Hallman, PGY4  Gastroenterology and Hepatology  Available on TEAMS    For non urgent consult, please email [email or web address] (For Samira) or [email or web address] (For GISELA)  Long range page number [phone]. Short range [phone]

## 2024-09-27 DIAGNOSIS — D63.8 ANEMIA IN OTHER CHRONIC DISEASES CLASSIFIED ELSEWHERE: ICD-10-CM

## 2024-09-27 DIAGNOSIS — R50.9 FEVER, UNSPECIFIED: ICD-10-CM

## 2024-09-27 DIAGNOSIS — K74.69 OTHER CIRRHOSIS OF LIVER: ICD-10-CM

## 2024-09-27 DIAGNOSIS — I10 ESSENTIAL (PRIMARY) HYPERTENSION: ICD-10-CM

## 2024-09-27 DIAGNOSIS — D64.9 ANEMIA, UNSPECIFIED: ICD-10-CM

## 2024-09-27 DIAGNOSIS — G93.41 METABOLIC ENCEPHALOPATHY: ICD-10-CM

## 2024-09-27 DIAGNOSIS — T14.8XXA OTHER INJURY OF UNSPECIFIED BODY REGION, INITIAL ENCOUNTER: ICD-10-CM

## 2024-09-27 DIAGNOSIS — Z29.9 ENCOUNTER FOR PROPHYLACTIC MEASURES, UNSPECIFIED: ICD-10-CM

## 2024-09-27 LAB
ALBUMIN SERPL ELPH-MCNC: 2.5 G/DL — LOW (ref 3.3–5)
ALP SERPL-CCNC: 133 U/L — HIGH (ref 40–120)
ALT FLD-CCNC: 112 U/L — HIGH (ref 10–45)
ANION GAP SERPL CALC-SCNC: 10 MMOL/L — SIGNIFICANT CHANGE UP (ref 5–17)
APTT BLD: 32.8 SEC — SIGNIFICANT CHANGE UP (ref 24.5–35.6)
AST SERPL-CCNC: 93 U/L — HIGH (ref 10–40)
BASOPHILS # BLD AUTO: 0.05 K/UL — SIGNIFICANT CHANGE UP (ref 0–0.2)
BASOPHILS NFR BLD AUTO: 0.8 % — SIGNIFICANT CHANGE UP (ref 0–2)
BILIRUB SERPL-MCNC: 17.3 MG/DL — HIGH (ref 0.2–1.2)
BUN SERPL-MCNC: 13 MG/DL — SIGNIFICANT CHANGE UP (ref 7–23)
CALCIUM SERPL-MCNC: 8.4 MG/DL — SIGNIFICANT CHANGE UP (ref 8.4–10.5)
CHLORIDE SERPL-SCNC: 118 MMOL/L — HIGH (ref 96–108)
CO2 SERPL-SCNC: 18 MMOL/L — LOW (ref 22–31)
CORTIS AM PEAK SERPL-MCNC: 2.9 UG/DL — LOW (ref 6–18.4)
CREAT SERPL-MCNC: 1.33 MG/DL — HIGH (ref 0.5–1.3)
EGFR: 62 ML/MIN/1.73M2 — SIGNIFICANT CHANGE UP
EOSINOPHIL # BLD AUTO: 0.36 K/UL — SIGNIFICANT CHANGE UP (ref 0–0.5)
EOSINOPHIL NFR BLD AUTO: 5.6 % — SIGNIFICANT CHANGE UP (ref 0–6)
GLUCOSE BLDC GLUCOMTR-MCNC: 72 MG/DL — SIGNIFICANT CHANGE UP (ref 70–99)
GLUCOSE BLDC GLUCOMTR-MCNC: 76 MG/DL — SIGNIFICANT CHANGE UP (ref 70–99)
GLUCOSE BLDC GLUCOMTR-MCNC: 96 MG/DL — SIGNIFICANT CHANGE UP (ref 70–99)
GLUCOSE SERPL-MCNC: 80 MG/DL — SIGNIFICANT CHANGE UP (ref 70–99)
HCT VFR BLD CALC: 26.4 % — LOW (ref 39–50)
HCT VFR BLD CALC: 27.1 % — LOW (ref 39–50)
HGB BLD-MCNC: 8.5 G/DL — LOW (ref 13–17)
HGB BLD-MCNC: 8.8 G/DL — LOW (ref 13–17)
IMM GRANULOCYTES NFR BLD AUTO: 1.1 % — HIGH (ref 0–0.9)
INR BLD: 1.53 RATIO — HIGH (ref 0.85–1.16)
LYMPHOCYTES # BLD AUTO: 1.5 K/UL — SIGNIFICANT CHANGE UP (ref 1–3.3)
LYMPHOCYTES # BLD AUTO: 23.3 % — SIGNIFICANT CHANGE UP (ref 13–44)
MAGNESIUM SERPL-MCNC: 2.2 MG/DL — SIGNIFICANT CHANGE UP (ref 1.6–2.6)
MCHC RBC-ENTMCNC: 25.8 PG — LOW (ref 27–34)
MCHC RBC-ENTMCNC: 25.9 PG — LOW (ref 27–34)
MCHC RBC-ENTMCNC: 32.2 GM/DL — SIGNIFICANT CHANGE UP (ref 32–36)
MCHC RBC-ENTMCNC: 32.5 GM/DL — SIGNIFICANT CHANGE UP (ref 32–36)
MCV RBC AUTO: 79.7 FL — LOW (ref 80–100)
MCV RBC AUTO: 80.2 FL — SIGNIFICANT CHANGE UP (ref 80–100)
MONOCYTES # BLD AUTO: 0.6 K/UL — SIGNIFICANT CHANGE UP (ref 0–0.9)
MONOCYTES NFR BLD AUTO: 9.3 % — SIGNIFICANT CHANGE UP (ref 2–14)
NEUTROPHILS # BLD AUTO: 3.87 K/UL — SIGNIFICANT CHANGE UP (ref 1.8–7.4)
NEUTROPHILS NFR BLD AUTO: 59.9 % — SIGNIFICANT CHANGE UP (ref 43–77)
NRBC # BLD: 0 /100 WBCS — SIGNIFICANT CHANGE UP (ref 0–0)
NRBC # BLD: 0 /100 WBCS — SIGNIFICANT CHANGE UP (ref 0–0)
PHOSPHATE SERPL-MCNC: 3.3 MG/DL — SIGNIFICANT CHANGE UP (ref 2.5–4.5)
PLATELET # BLD AUTO: 140 K/UL — LOW (ref 150–400)
PLATELET # BLD AUTO: 162 K/UL — SIGNIFICANT CHANGE UP (ref 150–400)
POTASSIUM SERPL-MCNC: 3.9 MMOL/L — SIGNIFICANT CHANGE UP (ref 3.5–5.3)
POTASSIUM SERPL-SCNC: 3.9 MMOL/L — SIGNIFICANT CHANGE UP (ref 3.5–5.3)
PROT SERPL-MCNC: 7.1 G/DL — SIGNIFICANT CHANGE UP (ref 6–8.3)
PROTHROM AB SERPL-ACNC: 17.5 SEC — HIGH (ref 9.9–13.4)
RBC # BLD: 3.29 M/UL — LOW (ref 4.2–5.8)
RBC # BLD: 3.4 M/UL — LOW (ref 4.2–5.8)
RBC # FLD: 25.8 % — HIGH (ref 10.3–14.5)
RBC # FLD: 26.6 % — HIGH (ref 10.3–14.5)
SODIUM SERPL-SCNC: 146 MMOL/L — HIGH (ref 135–145)
WBC # BLD: 6.45 K/UL — SIGNIFICANT CHANGE UP (ref 3.8–10.5)
WBC # BLD: 8.03 K/UL — SIGNIFICANT CHANGE UP (ref 3.8–10.5)
WBC # FLD AUTO: 6.45 K/UL — SIGNIFICANT CHANGE UP (ref 3.8–10.5)
WBC # FLD AUTO: 8.03 K/UL — SIGNIFICANT CHANGE UP (ref 3.8–10.5)

## 2024-09-27 PROCEDURE — 99233 SBSQ HOSP IP/OBS HIGH 50: CPT | Mod: GC

## 2024-09-27 RX ORDER — ALCOHOL ANTISEPTIC PADS
12.5 PADS, MEDICATED (EA) TOPICAL ONCE
Refills: 0 | Status: DISCONTINUED | OUTPATIENT
Start: 2024-09-27 | End: 2024-10-04

## 2024-09-27 RX ORDER — SODIUM CHLORIDE IRRIG SOLUTION 0.9 %
1000 SOLUTION, IRRIGATION IRRIGATION
Refills: 0 | Status: DISCONTINUED | OUTPATIENT
Start: 2024-09-27 | End: 2024-09-28

## 2024-09-27 RX ORDER — GLUCAGON INJECTION, SOLUTION 0.5 MG/.1ML
1 INJECTION, SOLUTION SUBCUTANEOUS ONCE
Refills: 0 | Status: DISCONTINUED | OUTPATIENT
Start: 2024-09-27 | End: 2024-10-04

## 2024-09-27 RX ORDER — ALCOHOL ANTISEPTIC PADS
25 PADS, MEDICATED (EA) TOPICAL ONCE
Refills: 0 | Status: DISCONTINUED | OUTPATIENT
Start: 2024-09-27 | End: 2024-10-04

## 2024-09-27 RX ORDER — ALCOHOL ANTISEPTIC PADS
15 PADS, MEDICATED (EA) TOPICAL ONCE
Refills: 0 | Status: DISCONTINUED | OUTPATIENT
Start: 2024-09-27 | End: 2024-10-04

## 2024-09-27 RX ORDER — SODIUM CHLORIDE IRRIG SOLUTION 0.9 %
1000 SOLUTION, IRRIGATION IRRIGATION
Refills: 0 | Status: DISCONTINUED | OUTPATIENT
Start: 2024-09-27 | End: 2024-10-04

## 2024-09-27 RX ADMIN — QUETIAPINE FUMARATE 25 MILLIGRAM(S): 50 TABLET, FILM COATED ORAL at 21:45

## 2024-09-27 RX ADMIN — THIAMINE HYDROCHLORIDE 100 MILLIGRAM(S): 100 INJECTION, SOLUTION INTRAMUSCULAR; INTRAVENOUS at 14:08

## 2024-09-27 RX ADMIN — Medication 100 MILLILITER(S): at 21:44

## 2024-09-27 RX ADMIN — CHLORHEXIDINE GLUCONATE ORAL RINSE 1 APPLICATION(S): 1.2 SOLUTION DENTAL at 06:28

## 2024-09-27 RX ADMIN — LABETALOL HYDROCHLORIDE 200 MILLIGRAM(S): 200 TABLET, FILM COATED ORAL at 06:27

## 2024-09-27 RX ADMIN — PIPERACILLIN SODIUM AND TAZOBACTAM SODIUM 25 GRAM(S): 12; 1.5 INJECTION, POWDER, LYOPHILIZED, FOR SOLUTION INTRAVENOUS at 06:26

## 2024-09-27 RX ADMIN — PIPERACILLIN SODIUM AND TAZOBACTAM SODIUM 25 GRAM(S): 12; 1.5 INJECTION, POWDER, LYOPHILIZED, FOR SOLUTION INTRAVENOUS at 14:14

## 2024-09-27 RX ADMIN — LABETALOL HYDROCHLORIDE 200 MILLIGRAM(S): 200 TABLET, FILM COATED ORAL at 14:15

## 2024-09-27 RX ADMIN — LABETALOL HYDROCHLORIDE 200 MILLIGRAM(S): 200 TABLET, FILM COATED ORAL at 21:45

## 2024-09-27 RX ADMIN — PANTOPRAZOLE SODIUM 40 MILLIGRAM(S): 40 TABLET, DELAYED RELEASE ORAL at 06:28

## 2024-09-27 RX ADMIN — PANTOPRAZOLE SODIUM 40 MILLIGRAM(S): 40 TABLET, DELAYED RELEASE ORAL at 18:23

## 2024-09-27 RX ADMIN — QUETIAPINE FUMARATE 25 MILLIGRAM(S): 50 TABLET, FILM COATED ORAL at 14:08

## 2024-09-27 RX ADMIN — FOLIC ACID 1 MILLIGRAM(S): 1 TABLET ORAL at 14:08

## 2024-09-27 RX ADMIN — PIPERACILLIN SODIUM AND TAZOBACTAM SODIUM 25 GRAM(S): 12; 1.5 INJECTION, POWDER, LYOPHILIZED, FOR SOLUTION INTRAVENOUS at 21:44

## 2024-09-27 RX ADMIN — LACTULOSE 30 GRAM(S): 10 SOLUTION ORAL; RECTAL at 06:27

## 2024-09-27 RX ADMIN — LACTULOSE 30 GRAM(S): 10 SOLUTION ORAL; RECTAL at 14:15

## 2024-09-27 NOTE — DISCHARGE NOTE PROVIDER - CARE PROVIDER_API CALL
Rasheed Gandhi  Transplant Hepatology  [address]  [place] [address]  Phone: [phone]  Fax: [phone]  Follow Up Time:

## 2024-09-27 NOTE — PROGRESS NOTE ADULT - PROBLEM SELECTOR PLAN 3
-Pt with episode of fever  -Zosyn ( 9/23-9/27   -s/p Vanc -Pt with episode of fever  -Zosyn ( 9/23-9/27)   -s/p Vanc  -blood cx 9/19 negative

## 2024-09-27 NOTE — PROGRESS NOTE ADULT - SUBJECTIVE AND OBJECTIVE BOX
VANDANA VILLA  59y  Male      Patient is a 59y old  Male who presents with a chief complaint of Acute Liver Failure (26 Sep 2024 17:33)      INTERVAL HPI/OVERNIGHT EVENTS:      REVIEW OF SYSTEMS:  CONSTITUTIONAL: No fever, weight loss, or fatigue  EYES: No eye pain, visual disturbances, or discharge  ENMT:  No difficulty hearing, tinnitus, vertigo; No sinus or throat pain  NECK: No pain or stiffness  BREASTS: No pain, masses, or nipple discharge  RESPIRATORY: No cough, wheezing, chills or hemoptysis; No shortness of breath  CARDIOVASCULAR: No chest pain, palpitations, dizziness, or leg swelling  GASTROINTESTINAL: No abdominal or epigastric pain. No nausea, vomiting, or hematemesis; No diarrhea or constipation. No melena or hematochezia.  GENITOURINARY: No dysuria, frequency, hematuria, or incontinence  NEUROLOGICAL: No headaches, memory loss, loss of strength, numbness, or tremors  SKIN: No itching, burning, rashes, or lesions   LYMPH NODES: No enlarged glands  ENDOCRINE: No heat or cold intolerance; No hair loss  MUSCULOSKELETAL: No joint pain or swelling; No muscle, back, or extremity pain  PSYCHIATRIC: No depression, anxiety, mood swings, or difficulty sleeping  HEME/LYMPH: No easy bruising, or bleeding gums  ALLERY AND IMMUNOLOGIC: No hives or eczema  FAMILY HISTORY:  FH: HTN (hypertension)      T(C): 36.6 (09-27-24 @ 04:43), Max: 36.8 (09-26-24 @ 12:00)  HR: 75 (09-27-24 @ 04:43) (68 - 80)  BP: 152/83 (09-27-24 @ 04:43) (123/73 - 168/74)  RR: 18 (09-27-24 @ 04:43) (13 - 28)  SpO2: 100% (09-27-24 @ 04:43) (95% - 100%)  Wt(kg): --Vital Signs Last 24 Hrs  T(C): 36.6 (27 Sep 2024 04:43), Max: 36.8 (26 Sep 2024 12:00)  T(F): 97.9 (27 Sep 2024 04:43), Max: 98.2 (26 Sep 2024 12:00)  HR: 75 (27 Sep 2024 04:43) (68 - 80)  BP: 152/83 (27 Sep 2024 04:43) (123/73 - 168/74)  BP(mean): 101 (26 Sep 2024 19:00) (88 - 106)  RR: 18 (27 Sep 2024 04:43) (13 - 28)  SpO2: 100% (27 Sep 2024 04:43) (95% - 100%)    Parameters below as of 27 Sep 2024 04:43  Patient On (Oxygen Delivery Method): room air        PHYSICAL EXAM:  GENERAL: NAD, well-groomed, well-developed  HEAD:  Atraumatic, Normocephalic  EYES: EOMI, PERRLA, conjunctiva and sclera clear  ENMT: No tonsillar erythema, exudates, or enlargement; Moist mucous membranes, Good dentition, No lesions  NECK: Supple, No JVD, Normal thyroid  NERVOUS SYSTEM:  Alert & Oriented X3, Good concentration; Motor Strength 5/5 B/L upper and lower extremities; DTRs 2+ intact and symmetric  CHEST/LUNG: Clear to percussion bilaterally; No rales, rhonchi, wheezing, or rubs  HEART: Regular rate and rhythm; No murmurs, rubs, or gallops  ABDOMEN: Soft, Nontender, Nondistended; Bowel sounds present  EXTREMITIES:  2+ Peripheral Pulses, No clubbing, cyanosis, or edema  LYMPH: No lymphadenopathy noted  SKIN: No rashes or lesions    Consultant(s) Notes Reviewed:  [x ] YES  [ ] NO  Care Discussed with Consultants/Other Providers [ x] YES  [ ] NO    LABS:          RADIOLOGY & ADDITIONAL TESTS:    Imaging Personally Reviewed:  [ ] YES  [ ] NO  chlorhexidine 4% Liquid 1 Application(s) Topical <User Schedule>  dextrose 5% + lactated ringers. 1000 milliLiter(s) IV Continuous <Continuous>  folic acid Injectable 1 milliGRAM(s) IV Push daily  labetalol 200 milliGRAM(s) Oral three times a day  lactulose Syrup 30 Gram(s) Oral every 8 hours  pantoprazole  Injectable 40 milliGRAM(s) IV Push two times a day  piperacillin/tazobactam IVPB.. 3.375 Gram(s) IV Intermittent every 8 hours  QUEtiapine 25 milliGRAM(s) Oral at bedtime  QUEtiapine 25 milliGRAM(s) Oral daily  rifAXIMin 550 milliGRAM(s) Oral two times a day  thiamine 100 milliGRAM(s) Oral daily      HEALTH ISSUES - PROBLEM Dx:  Acetaminophen toxicity             VANDANA VILLA  59y  Male      Patient is a 59y old  Male who presents with a chief complaint of Acute Liver Failure (26 Sep 2024 17:33)      INTERVAL HPI/OVERNIGHT EVENTS: Yesterday, pt w/ Hgb 6.7 -> s/p 1u pRBC.     Patient seen and examined at bedside. A&Ox2 (first name, year). He is feeling ok, denied abdominal pain, chest pain or SOB.      REVIEW OF SYSTEMS:  CONSTITUTIONAL: No fever, weight loss, or fatigue  RESPIRATORY: No cough, wheezing, chills or hemoptysis; No shortness of breath  CARDIOVASCULAR: No chest pain, palpitations, dizziness, or leg swelling  GASTROINTESTINAL: No abdominal or epigastric pain. No nausea, vomiting, or hematemesis; No diarrhea or constipation. No melena or hematochezia.  GENITOURINARY: No dysuria, frequency, hematuria, or incontinence  NEUROLOGICAL: No headaches, memory loss, loss of strength, numbness, or tremors  SKIN: No itching, burning, rashes, or lesions   LYMPH NODES: No enlarged glands  ENDOCRINE: No heat or cold intolerance; No hair loss  MUSCULOSKELETAL: No joint pain or swelling; No muscle, back, or extremity pain      FAMILY HISTORY:  FH: HTN (hypertension)      Vital Signs Last 24 Hrs  T(C): 36.6 (27 Sep 2024 04:43), Max: 36.8 (26 Sep 2024 12:00)  T(F): 97.9 (27 Sep 2024 04:43), Max: 98.2 (26 Sep 2024 12:00)  HR: 75 (27 Sep 2024 04:43) (68 - 80)  BP: 152/83 (27 Sep 2024 04:43) (123/73 - 168/74)  BP(mean): 101 (26 Sep 2024 19:00) (88 - 106)  RR: 18 (27 Sep 2024 04:43) (13 - 28)  SpO2: 100% (27 Sep 2024 04:43) (95% - 100%)    Parameters below as of 27 Sep 2024 04:43  Patient On (Oxygen Delivery Method): room air        PHYSICAL EXAM:  GENERAL: NAD, well-groomed, well-developed  HEAD:  Atraumatic, Normocephalic  EYES: EOMI, PERRLA, conjunctiva and sclera clear  ENMT: No tonsillar erythema, exudates, or enlargement, dry mucous membranes  NECK: Supple, No JVD, Normal thyroid  NERVOUS SYSTEM:  Alert & Oriented X3, Good concentration  CHEST/LUNG: Clear to percussion bilaterally; No rales, rhonchi, wheezing, or rubs  HEART: Regular rate and rhythm; No murmurs, rubs, or gallops  ABDOMEN: Soft, Nontender, Nondistended; Bowel sounds present  EXTREMITIES:  2+ Peripheral Pulses, No clubbing, cyanosis, or edema  LYMPH: No lymphadenopathy noted  SKIN: No rashes or lesions    Consultant(s) Notes Reviewed:  [x ] YES  [ ] NO  Care Discussed with Consultants/Other Providers [ x] YES  [ ] NO    LABS:          RADIOLOGY & ADDITIONAL TESTS:    Imaging Personally Reviewed:  [ ] YES  [ ] NO  chlorhexidine 4% Liquid 1 Application(s) Topical <User Schedule>  dextrose 5% + lactated ringers. 1000 milliLiter(s) IV Continuous <Continuous>  folic acid Injectable 1 milliGRAM(s) IV Push daily  labetalol 200 milliGRAM(s) Oral three times a day  lactulose Syrup 30 Gram(s) Oral every 8 hours  pantoprazole  Injectable 40 milliGRAM(s) IV Push two times a day  piperacillin/tazobactam IVPB.. 3.375 Gram(s) IV Intermittent every 8 hours  QUEtiapine 25 milliGRAM(s) Oral at bedtime  QUEtiapine 25 milliGRAM(s) Oral daily  rifAXIMin 550 milliGRAM(s) Oral two times a day  thiamine 100 milliGRAM(s) Oral daily      HEALTH ISSUES - PROBLEM Dx:  Acetaminophen toxicity

## 2024-09-27 NOTE — DISCHARGE NOTE PROVIDER - HOSPITAL COURSE
HPI:    Patient is a 58 y/o male with pmh of ETOH abuse, and gastritis who initially presented to OSH due to concern of abdominal pain. Patient underwent an evaluation for acute cholecystitis and was treated for ETOH withdrawal. Patient presented with elevated LFTs that started to uptrend and patient has now been transferred to [facility] for further evaluation by the Hepatology team. Patient is currently altered and is unable to provide much collateral. Patient was treated with NAC at OSH.     Hospital Course:    Patient underwent an evaluation for acute cholecystitis and was treated for ETOH w/d. At OSH, LFTs began increasing, started on NAC gtt. Pt was transferred to [facility] on 9/15 for AMS 2/2 etoh w/d and evaluation of acute liver failure by Hepatology. While in MICU, pt was on NAC gtt from 9/15-9/23. Per hepatology, pt is unlikely a candidate for LT due to multiple admissions in the past with alcohol withdrawal. Pt was intubated for airway protection on 9/17, extubated 9/23. Pt received PLEX x3, mannitol, hypertonic saline for hyperammonemia and cerebral edema i/s/o CHCF. Pt also given lactulose, ammonia down to 42. Mental status improved. Pt developed RUE hematoma on US (9/25). Pt received 1u pRBC on 9/26and repeat CBC was stable.    Medication Changes:    Important Follow Ups:    59M with PMHx ETOH abuse with frequent admissions for withdrawal & gastritis initially admitted to ICU for acute on chronic decompensated liver failure & alcohol withdrawal. S/p intubation for airway protection (9/17-9/23). Pt received PLEX x3, mannitol, hypertonic saline for hyperammonemia and cerebral edema. Pt also given lactulose, ammonia 150s-->42. Course c/b abdominal distension 2/2 ascites vs. distended loops of bowel (patient not clinically obstructed). Course c/b R wrist drop 2/2 R axillary hematoma compressing neurovascular bundle (likely from recent line). Liver transplant evaluation opened and declined on 10/11. Patient is deemed stable for discharge by the transplant surgery team.    Follow up with transplant hepatologist in 1-2 weeks.

## 2024-09-27 NOTE — CHART NOTE - NSCHARTNOTEFT_GEN_A_CORE
Severe fatty liver disease  initial down trend of hemoglobin. No reported GIB.  Patient denies melena  he is s/p 1 unit blood.  hemoglobin stable    -Patient can be taken off NPO  -Please monitor hemoglobin  -Plan for outpatient EGD/Colonoscopy due to Iron deficiency anemia with tentative plan for inpatient scope if further down trend of hemoglobin  -keep on PPI      Griselda Hallman, PGY4  Gastroenterology and Hepatology  Available on TEAMS    For non urgent consult, please email [email or web address] (For [place]) or [email or web address] (For [facility])  Long range page number [phone]. Short range [phone]

## 2024-09-27 NOTE — PROGRESS NOTE ADULT - ASSESSMENT
Patient is a 59 year old M with PMHx ETOH abuse with frequent admissions for withdrawal & gastritis admitted to ICU for acute on chronic decompensated liver failure & alcohol withdrawal. S/p intubation for airway protection. Started on PLEX for Acute liver failure. Pending further evaluation regarding AMS.

## 2024-09-27 NOTE — DISCHARGE NOTE PROVIDER - NSDCCPCAREPLAN_GEN_ALL_CORE_FT
PRINCIPAL DISCHARGE DIAGNOSIS  Diagnosis: Decompensated liver disease  Assessment and Plan of Treatment: You came to the hospital for decompensated liver disease likely exacerbated by tylenol and alcohol use. You were given multiple medications in the ICU. You were seen by hepatology and deemed not to be a liver transplant candidate due to frequent alcohol use. Please refrain from continuing to drink alcohol and follow closely with a hepatologist.      SECONDARY DISCHARGE DIAGNOSES  Diagnosis: Acute metabolic encephalopathy  Assessment and Plan of Treatment: You were altered on presentation to the hospital, resulting in intubation for protection of your airway. Your mental status gradually improved. Please refrain from alcohol use.

## 2024-09-27 NOTE — DISCHARGE NOTE PROVIDER - NSDCCPTREATMENT_GEN_ALL_CORE_FT
PRINCIPAL PROCEDURE  Procedure: MRI head  Findings and Treatment:   < end of copied text >  FINDINGS:  VENTRICLES AND SULCI:  Normal.  INTRA-AXIAL:  No acute intracranial hemorrhage, midline shift or evidence   of acute cerebral ischemia. No abnormal enhancement.  EXTRA-AXIAL:  No mass or collection.  VISUALIZED SINUSES: Mild mucosal thickening.  VISUALIZED MASTOIDS:  Clear.  CALVARIUM:  Normal.  CAROTID FLOW VOIDS: Normal.  MISCELLANEOUS:  None.  IMPRESSION: NO EVIDENCE OF INTRACRANIAL HEMORRHAGE, ACUTE TERRITORIAL   INFARCT OR AREA OF ABNORMAL ENHANCEMENT.< from: MR Head w/wo IV Cont (09.19.24 @ 00:56) >        SECONDARY PROCEDURE  Procedure: Vascular duplex ultrasonography of both upper extremities  Findings and Treatment:   < end of copied text >  FINDINGS:  The right internal jugular, subclavian, axillary and brachial veins are   patent and compressible where applicable. Superficial thrombus noted in   the right basilic vein.  4.2 x 4.3 x 6.4 cm avascular complex collection in the right axillary   fossa may represent a hematoma.  IMPRESSION:  No evidence of right upper extremity deep venous thrombosis.  Superficial thrombus noted in the right basilic vein.  4.2 x 4.3 x 6.4 cm avascular complex collection in the right axillary   fossa may represent a hematoma. Follow-up cross-sectional imaging   suggested to resolution.< from: [facility] Duplex Upper Ext Vein Scan, Right (09.24.24 @ 23:53) >

## 2024-09-27 NOTE — DISCHARGE NOTE PROVIDER - NSDCMRMEDTOKEN_GEN_ALL_CORE_FT
folic acid 1 mg oral tablet: 1 tab(s) orally once a day  Multiple Vitamins oral tablet: 1 tab(s) orally once a day  thiamine 100 mg oral tablet: 1 tab(s) orally once a day   folic acid 1 mg oral tablet: 1 tab(s) orally once a day  hydrOXYzine hydrochloride 25 mg oral tablet: 1 tab(s) orally 3 times a day as needed for Itching  lactulose 10 g/15 mL oral syrup: 45 milliliter(s) orally every 8 hours Titrate to 3-4 bowel movements a day  magnesium oxide 400 mg oral tablet: 1 tab(s) orally 2 times a day (with meals)  Multiple Vitamins oral tablet: 1 tab(s) orally once a day  pantoprazole 40 mg oral delayed release tablet: 1 tab(s) orally once a day (before a meal)  polyethylene glycol 3350 oral powder for reconstitution: 17 gram(s) orally once a day Titrate to 3-4 bowel movements a day  rifAXIMin 550 mg oral tablet: 1 tab(s) orally 2 times a day  thiamine 100 mg oral tablet: 1 tab(s) orally once a day

## 2024-09-27 NOTE — PROGRESS NOTE ADULT - PROBLEM SELECTOR PLAN 6
DVT ppx: hold ac for hematoma    DIet: NPO for now    Dispo: TBA -c/w labetalol tid  - TTE 9/2023 - EF 65%

## 2024-09-27 NOTE — PROGRESS NOTE ADULT - PROBLEM SELECTOR PLAN 2
Pt with long history of etoh abuse with evidence of hepatic steatosis on imaging.  Appears to be related to a prior ischemic episode   - RUQ US: Distended gallbladder with wall thickening up to 5 mm and trace pericholecystic fluid without evidence of cholelithiasis or biliary dilatation. Negative sonographic Gibson's sign.   - CTAP: Nondistended gallbladder with diffuse nonspecific wall edema. Severe fatty liver.  - labs significant for thrombocytopenia, transaminitis, anemia, elevated bilirubin & decreased fibrinogen, elevated lactate.   Plan  -Continue Thiamine and Folate  - s/p NAC, currently off   -S/P PLEX as above   -Hepatology following  -Continue Lactulose and Miralax  -Continue Rifaximin

## 2024-09-27 NOTE — PROGRESS NOTE ADULT - PROBLEM SELECTOR PLAN 1
Likely secondary to elevated ammonia level along with liver failure.   -CT head showing concerns for cerebral edema or global hypoxic injury  -CTA and CTV- appear to have no major findings  -MRI did not show any acute issues  -vEEG- no major findings, repeat 9/23 no seizures  - Ammonia improving   -Neurology on board  -S/P PLEX- got 1st round 9/17 and 2nd 9/19, 3rd round 9/20  -Continue Lactulose and Miralax- ammonia improving   -c/w Rifaximin  - Trial Seroquel 25 BID  at PM for agitation Likely secondary to elevated ammonia level along with liver failure.   -CT head showing concerns for cerebral edema or global hypoxic injury  -CTA and CTV- appear to have no major findings  -MRI did not show any acute issues  -vEEG- no major findings, repeat 9/23 no seizures  - Ammonia improving   -Neurology on board  -S/P PLEX- got 1st round 9/17 and 2nd 9/19, 3rd round 9/20  -Continue Lactulose and Miralax- ammonia improving   -c/w Rifaximin  - c/w Seroquel 25 BID for agitation  -pt currently with NGT, consider speech and swallow eval

## 2024-09-27 NOTE — PROGRESS NOTE ADULT - PROBLEM SELECTOR PLAN 4
-required 1 u pRBC 9/23  -Hgb dropped from   -1u pRBC on 9/26-> repeat CBC stable, check CBC this AM  -keep NPO for scope with hepatology Yes

## 2024-09-27 NOTE — PROGRESS NOTE ADULT - ATTENDING COMMENTS
60 y/o male, PMH ETOH abuse, gastritis, PUD, hx of hypoxic respiratory failure requiring intubation due to aspiration PNA (most recent intubation Aril 2020), presented to [facility] on 9/13 c/o etoh w/d, abd pain, N/V. LFTs began increasing, started on NAC gtt. Pt was transferred to [facility] on 9/15 for evaluation of acute liver failure by Hepatology.     Admitted to the MICU, was on NAC gtt frtom 9/15-9/23. Per hepatology, pt is unlikely a candidate for LT due to multiple admissions in the past with alcohol withdrawal. Pt was intubated on 9/17, extubated 9/23. Pt received PLEX x3, mannitol, hypertonic saline for hyperammonemia and cerebral edema. Pt also given lactulose, ammonia down to 42. Mental status improved, transferred to floor.     Acute drop in Hb, transfuse today. RUE extensive subcutaneous induration/hematoma noted- monitor for improvement.     On oral diet and tube feeds in the MICU as poor po intake. Wean off tube feeds as po improves.     Bilirubin worsening- monitor- Hepatology following. 58 y/o male, PMH ETOH abuse, gastritis, PUD, hx of hypoxic respiratory failure requiring intubation due to aspiration PNA (most recent intubation Aril 2020), presented to [facility] on 9/13 c/o etoh w/d, abd pain, N/V. LFTs began increasing, started on NAC gtt. Pt was transferred to [facility] on 9/15 for evaluation of acute liver failure by Hepatology.     Admitted to the MICU, was on NAC gtt frtom 9/15-9/23. Per hepatology, pt is unlikely a candidate for LT due to multiple admissions in the past with alcohol withdrawal. Pt was intubated on 9/17, extubated 9/23. Pt received PLEX x3, mannitol, hypertonic saline for hyperammonemia and cerebral edema. Pt also given lactulose, ammonia down to 42. Mental status improved, transferred to floor.     Acute drop in Hb, transfused yesterday. RUE extensive subcutaneous induration/hematoma noted- monitor for improvement.     On oral diet and tube feeds in the MICU as poor po intake. Wean off tube feeds as po improves.     Bilirubin worsening- monitor- Hepatology following.

## 2024-09-28 LAB
ALBUMIN SERPL ELPH-MCNC: 2.4 G/DL — LOW (ref 3.3–5)
ALP SERPL-CCNC: 136 U/L — HIGH (ref 40–120)
ALT FLD-CCNC: 101 U/L — HIGH (ref 10–45)
ANION GAP SERPL CALC-SCNC: 10 MMOL/L — SIGNIFICANT CHANGE UP (ref 5–17)
AST SERPL-CCNC: 96 U/L — HIGH (ref 10–40)
BILIRUB SERPL-MCNC: 18.3 MG/DL — HIGH (ref 0.2–1.2)
BUN SERPL-MCNC: 14 MG/DL — SIGNIFICANT CHANGE UP (ref 7–23)
CALCIUM SERPL-MCNC: 8.5 MG/DL — SIGNIFICANT CHANGE UP (ref 8.4–10.5)
CHLORIDE SERPL-SCNC: 117 MMOL/L — HIGH (ref 96–108)
CO2 SERPL-SCNC: 14 MMOL/L — LOW (ref 22–31)
CREAT SERPL-MCNC: 1.19 MG/DL — SIGNIFICANT CHANGE UP (ref 0.5–1.3)
EGFR: 71 ML/MIN/1.73M2 — SIGNIFICANT CHANGE UP
GLUCOSE BLDC GLUCOMTR-MCNC: 101 MG/DL — HIGH (ref 70–99)
GLUCOSE BLDC GLUCOMTR-MCNC: 104 MG/DL — HIGH (ref 70–99)
GLUCOSE BLDC GLUCOMTR-MCNC: 106 MG/DL — HIGH (ref 70–99)
GLUCOSE BLDC GLUCOMTR-MCNC: 91 MG/DL — SIGNIFICANT CHANGE UP (ref 70–99)
GLUCOSE SERPL-MCNC: 87 MG/DL — SIGNIFICANT CHANGE UP (ref 70–99)
HCT VFR BLD CALC: 29.4 % — LOW (ref 39–50)
HGB BLD-MCNC: 9 G/DL — LOW (ref 13–17)
MAGNESIUM SERPL-MCNC: 1.9 MG/DL — SIGNIFICANT CHANGE UP (ref 1.6–2.6)
MCHC RBC-ENTMCNC: 25.6 PG — LOW (ref 27–34)
MCHC RBC-ENTMCNC: 30.6 GM/DL — LOW (ref 32–36)
MCV RBC AUTO: 83.8 FL — SIGNIFICANT CHANGE UP (ref 80–100)
MRSA PCR RESULT.: DETECTED
NRBC # BLD: 0 /100 WBCS — SIGNIFICANT CHANGE UP (ref 0–0)
PHOSPHATE SERPL-MCNC: 3 MG/DL — SIGNIFICANT CHANGE UP (ref 2.5–4.5)
PLATELET # BLD AUTO: 158 K/UL — SIGNIFICANT CHANGE UP (ref 150–400)
POTASSIUM SERPL-MCNC: 4.3 MMOL/L — SIGNIFICANT CHANGE UP (ref 3.5–5.3)
POTASSIUM SERPL-SCNC: 4.3 MMOL/L — SIGNIFICANT CHANGE UP (ref 3.5–5.3)
PROT SERPL-MCNC: 7.1 G/DL — SIGNIFICANT CHANGE UP (ref 6–8.3)
RBC # BLD: 3.51 M/UL — LOW (ref 4.2–5.8)
RBC # FLD: 28.7 % — HIGH (ref 10.3–14.5)
S AUREUS DNA NOSE QL NAA+PROBE: DETECTED
SODIUM SERPL-SCNC: 141 MMOL/L — SIGNIFICANT CHANGE UP (ref 135–145)
WBC # BLD: 5.52 K/UL — SIGNIFICANT CHANGE UP (ref 3.8–10.5)
WBC # FLD AUTO: 5.52 K/UL — SIGNIFICANT CHANGE UP (ref 3.8–10.5)

## 2024-09-28 PROCEDURE — 99232 SBSQ HOSP IP/OBS MODERATE 35: CPT | Mod: GC

## 2024-09-28 RX ORDER — QUETIAPINE FUMARATE 50 MG/1
25 TABLET, FILM COATED ORAL AT BEDTIME
Refills: 0 | Status: DISCONTINUED | OUTPATIENT
Start: 2024-09-28 | End: 2024-10-03

## 2024-09-28 RX ORDER — MUPIROCIN 20 MG/G
1 OINTMENT TOPICAL
Refills: 0 | Status: COMPLETED | OUTPATIENT
Start: 2024-09-28 | End: 2024-10-03

## 2024-09-28 RX ORDER — THIAMINE HYDROCHLORIDE 100 MG/ML
100 INJECTION, SOLUTION INTRAMUSCULAR; INTRAVENOUS DAILY
Refills: 0 | Status: DISCONTINUED | OUTPATIENT
Start: 2024-09-28 | End: 2024-10-11

## 2024-09-28 RX ORDER — LACTULOSE 10 G/15ML
30 SOLUTION ORAL; RECTAL EVERY 8 HOURS
Refills: 0 | Status: DISCONTINUED | OUTPATIENT
Start: 2024-09-28 | End: 2024-10-11

## 2024-09-28 RX ORDER — QUETIAPINE FUMARATE 50 MG/1
25 TABLET, FILM COATED ORAL DAILY
Refills: 0 | Status: DISCONTINUED | OUTPATIENT
Start: 2024-09-28 | End: 2024-10-02

## 2024-09-28 RX ADMIN — LABETALOL HYDROCHLORIDE 200 MILLIGRAM(S): 200 TABLET, FILM COATED ORAL at 06:04

## 2024-09-28 RX ADMIN — PANTOPRAZOLE SODIUM 40 MILLIGRAM(S): 40 TABLET, DELAYED RELEASE ORAL at 05:58

## 2024-09-28 RX ADMIN — LABETALOL HYDROCHLORIDE 200 MILLIGRAM(S): 200 TABLET, FILM COATED ORAL at 13:39

## 2024-09-28 RX ADMIN — FOLIC ACID 1 MILLIGRAM(S): 1 TABLET ORAL at 11:46

## 2024-09-28 RX ADMIN — THIAMINE HYDROCHLORIDE 100 MILLIGRAM(S): 100 INJECTION, SOLUTION INTRAMUSCULAR; INTRAVENOUS at 11:50

## 2024-09-28 RX ADMIN — PANTOPRAZOLE SODIUM 40 MILLIGRAM(S): 40 TABLET, DELAYED RELEASE ORAL at 17:36

## 2024-09-28 RX ADMIN — QUETIAPINE FUMARATE 25 MILLIGRAM(S): 50 TABLET, FILM COATED ORAL at 11:46

## 2024-09-28 RX ADMIN — MUPIROCIN 1 APPLICATION(S): 20 OINTMENT TOPICAL at 17:35

## 2024-09-28 RX ADMIN — CHLORHEXIDINE GLUCONATE ORAL RINSE 1 APPLICATION(S): 1.2 SOLUTION DENTAL at 05:58

## 2024-09-28 RX ADMIN — MUPIROCIN 1 APPLICATION(S): 20 OINTMENT TOPICAL at 17:37

## 2024-09-28 NOTE — PROGRESS NOTE ADULT - PROBLEM SELECTOR PLAN 7
DVT ppx: hold ac for hematoma    DIet: NPO for now    Dispo: TBA DVT ppx: hold ac for hematoma    DIet: Diet w/  TF    Dispo: pending re-eval w/ PT

## 2024-09-28 NOTE — PROGRESS NOTE ADULT - PROBLEM SELECTOR PLAN 4
-required 1 u pRBC 9/23  -Hgb dropped from   -1u pRBC on 9/26-> repeat CBC stable, check CBC this AM  -keep NPO for scope with hepatology -required 1 u pRBC 9/23  -Hgb dropped from   -1u pRBC on 9/26-> repeat CBC stable   - no plan for scope this admission, per hepatology

## 2024-09-28 NOTE — PROGRESS NOTE ADULT - PROBLEM SELECTOR PLAN 1
Likely secondary to elevated ammonia level along with liver failure.   -CT head showing concerns for cerebral edema or global hypoxic injury  -CTA and CTV- appear to have no major findings  -MRI did not show any acute issues  -vEEG- no major findings, repeat 9/23 no seizures  - Ammonia improving   -Neurology on board  -S/P PLEX- got 1st round 9/17 and 2nd 9/19, 3rd round 9/20  -Continue Lactulose and Miralax- ammonia improving   -c/w Rifaximin  - c/w Seroquel 25 BID for agitation  -pt currently with NGT, consider speech and swallow eval Likely secondary to elevated ammonia level along with liver failure.   -CT head showing concerns for cerebral edema or global hypoxic injury  -CTA and CTV- appear to have no major findings  -MRI did not show any acute issues  -vEEG- no major findings, repeat 9/23 no seizures  - Ammonia improving   -Neurology on board  -S/P PLEX- got 1st round 9/17 and 2nd 9/19, 3rd round 9/20  -Continue Lactulose and Miralax- ammonia improving   -c/w Rifaximin  - c/w Seroquel 25 BID for agitation  -pt currently with NGT, because became hypoglycemic in MICU when only on regular diet.  -consider holding tube feeds and retrialling only regular diet as pt is more alert

## 2024-09-28 NOTE — PROGRESS NOTE ADULT - SUBJECTIVE AND OBJECTIVE BOX
VANDANA VILLA  59y  Male      Patient is a 59y old  Male who presents with a chief complaint of Acute Liver Failure (26 Sep 2024 17:33)      INTERVAL HPI/OVERNIGHT EVENTS: Yesterday, pt w/ Hgb 6.7 -> s/p 1u pRBC.     Patient seen and examined at bedside. A&Ox2 (first name, year). He is feeling ok, denied abdominal pain, chest pain or SOB.      REVIEW OF SYSTEMS:  CONSTITUTIONAL: No fever, weight loss, or fatigue  RESPIRATORY: No cough, wheezing, chills or hemoptysis; No shortness of breath  CARDIOVASCULAR: No chest pain, palpitations, dizziness, or leg swelling  GASTROINTESTINAL: No abdominal or epigastric pain. No nausea, vomiting, or hematemesis; No diarrhea or constipation. No melena or hematochezia.  GENITOURINARY: No dysuria, frequency, hematuria, or incontinence  NEUROLOGICAL: No headaches, memory loss, loss of strength, numbness, or tremors  SKIN: No itching, burning, rashes, or lesions   LYMPH NODES: No enlarged glands  ENDOCRINE: No heat or cold intolerance; No hair loss  MUSCULOSKELETAL: No joint pain or swelling; No muscle, back, or extremity pain      FAMILY HISTORY:  FH: HTN (hypertension)      Vital Signs Last 24 Hrs  T(C): 36.6 (27 Sep 2024 04:43), Max: 36.8 (26 Sep 2024 12:00)  T(F): 97.9 (27 Sep 2024 04:43), Max: 98.2 (26 Sep 2024 12:00)  HR: 75 (27 Sep 2024 04:43) (68 - 80)  BP: 152/83 (27 Sep 2024 04:43) (123/73 - 168/74)  BP(mean): 101 (26 Sep 2024 19:00) (88 - 106)  RR: 18 (27 Sep 2024 04:43) (13 - 28)  SpO2: 100% (27 Sep 2024 04:43) (95% - 100%)    Parameters below as of 27 Sep 2024 04:43  Patient On (Oxygen Delivery Method): room air        PHYSICAL EXAM:  GENERAL: NAD, well-groomed, well-developed  HEAD:  Atraumatic, Normocephalic  EYES: EOMI, PERRLA, conjunctiva and sclera clear  ENMT: No tonsillar erythema, exudates, or enlargement, dry mucous membranes  NECK: Supple, No JVD, Normal thyroid  NERVOUS SYSTEM:  Alert & Oriented X3, Good concentration  CHEST/LUNG: Clear to percussion bilaterally; No rales, rhonchi, wheezing, or rubs  HEART: Regular rate and rhythm; No murmurs, rubs, or gallops  ABDOMEN: Soft, Nontender, Nondistended; Bowel sounds present  EXTREMITIES:  2+ Peripheral Pulses, No clubbing, cyanosis, or edema  LYMPH: No lymphadenopathy noted  SKIN: No rashes or lesions    Consultant(s) Notes Reviewed:  [x ] YES  [ ] NO  Care Discussed with Consultants/Other Providers [ x] YES  [ ] NO    LABS:          RADIOLOGY & ADDITIONAL TESTS:    Imaging Personally Reviewed:  [ ] YES  [ ] NO  chlorhexidine 4% Liquid 1 Application(s) Topical <User Schedule>  dextrose 5% + lactated ringers. 1000 milliLiter(s) IV Continuous <Continuous>  folic acid Injectable 1 milliGRAM(s) IV Push daily  labetalol 200 milliGRAM(s) Oral three times a day  lactulose Syrup 30 Gram(s) Oral every 8 hours  pantoprazole  Injectable 40 milliGRAM(s) IV Push two times a day  piperacillin/tazobactam IVPB.. 3.375 Gram(s) IV Intermittent every 8 hours  QUEtiapine 25 milliGRAM(s) Oral at bedtime  QUEtiapine 25 milliGRAM(s) Oral daily  rifAXIMin 550 milliGRAM(s) Oral two times a day  thiamine 100 milliGRAM(s) Oral daily      HEALTH ISSUES - PROBLEM Dx:  Acetaminophen toxicity             VANDANA VILLA  59y  Male      Patient is a 59y old  Male who presents with a chief complaint of Acute Liver Failure (26 Sep 2024 17:33)      INTERVAL HPI/OVERNIGHT EVENTS: NAEON.    Patient seen and examined at bedside. A&Ox2 (first name, year). He is feeling ok, wondering what is next, denied abdominal pain, chest pain or SOB. Looking forward to eating.      REVIEW OF SYSTEMS:  CONSTITUTIONAL: No fever, weight loss, or fatigue  RESPIRATORY: No cough, wheezing, chills or hemoptysis; No shortness of breath  CARDIOVASCULAR: No chest pain, palpitations, dizziness, or leg swelling  GASTROINTESTINAL: No abdominal or epigastric pain. No nausea, vomiting, or hematemesis; No diarrhea or constipation. No melena or hematochezia.  GENITOURINARY: No dysuria, frequency, hematuria, or incontinence  NEUROLOGICAL: No headaches, memory loss, loss of strength, numbness, or tremors  SKIN: No itching, burning, rashes, or lesions   LYMPH NODES: No enlarged glands  ENDOCRINE: No heat or cold intolerance; No hair loss  MUSCULOSKELETAL: No joint pain or swelling; No muscle, back, or extremity pain      FAMILY HISTORY:  FH: HTN (hypertension)    Vital Signs Last 24 Hrs  T(C): 37 (28 Sep 2024 06:00), Max: 37.1 (27 Sep 2024 21:00)  T(F): 98.6 (28 Sep 2024 06:00), Max: 98.7 (27 Sep 2024 21:00)  HR: 73 (28 Sep 2024 06:00) (71 - 73)  BP: 125/75 (28 Sep 2024 06:00) (113/78 - 145/82)  BP(mean): --  RR: 18 (28 Sep 2024 06:00) (18 - 18)  SpO2: 99% (28 Sep 2024 06:00) (95% - 100%)    Parameters below as of 28 Sep 2024 06:00  Patient On (Oxygen Delivery Method): room air      PHYSICAL EXAM:  GENERAL: NAD, well-developed  HEAD:  Atraumatic, Normocephalic  EYES: conjunctiva and sclera clear  ENMT: No tonsillar erythema, exudates, or enlargement, dry mucous membranes  NECK: Supple, No JVD, Normal thyroid  NERVOUS SYSTEM:  Alert & Oriented X3, Good concentration  CHEST/LUNG: Clear to percussion bilaterally; No rales, rhonchi, wheezing, or rubs  HEART: Regular rate and rhythm; No murmurs, rubs, or gallops  ABDOMEN: Soft, Nontender, Nondistended; Bowel sounds present  EXTREMITIES:  2+ Peripheral Pulses, No clubbing, cyanosis, or edema  LYMPH: No lymphadenopathy noted  SKIN: No rashes or lesions    Consultant(s) Notes Reviewed:  [x ] YES  [ ] NO  Care Discussed with Consultants/Other Providers [ x] YES  [ ] NO    LABS:      RADIOLOGY & ADDITIONAL TESTS:    Imaging Personally Reviewed:  [ ] YES  [ ] NO  chlorhexidine 4% Liquid 1 Application(s) Topical <User Schedule>  dextrose 5% + lactated ringers. 1000 milliLiter(s) IV Continuous <Continuous>  folic acid Injectable 1 milliGRAM(s) IV Push daily  labetalol 200 milliGRAM(s) Oral three times a day  lactulose Syrup 30 Gram(s) Oral every 8 hours  pantoprazole  Injectable 40 milliGRAM(s) IV Push two times a day  piperacillin/tazobactam IVPB.. 3.375 Gram(s) IV Intermittent every 8 hours  QUEtiapine 25 milliGRAM(s) Oral at bedtime  QUEtiapine 25 milliGRAM(s) Oral daily  rifAXIMin 550 milliGRAM(s) Oral two times a day  thiamine 100 milliGRAM(s) Oral daily      HEALTH ISSUES - PROBLEM Dx:  Acetaminophen toxicity

## 2024-09-28 NOTE — PROGRESS NOTE ADULT - ATTENDING COMMENTS
60 y/o male, PMH ETOH abuse, gastritis, PUD, hx of hypoxic respiratory failure requiring intubation due to aspiration PNA (most recent intubation Aril 2020), presented to [facility] on 9/13 c/o etoh w/d, abd pain, N/V. LFTs began increasing, started on NAC gtt. Pt was transferred to [facility] on 9/15 for evaluation of acute liver failure by Hepatology.     Admitted to the MICU, was on NAC gtt frtom 9/15-9/23. Per hepatology, pt is unlikely a candidate for LT due to multiple admissions in the past with alcohol withdrawal. Pt was intubated on 9/17, extubated 9/23. Pt received PLEX x3, mannitol, hypertonic saline for hyperammonemia and cerebral edema. Pt also given lactulose, ammonia down to 42. Mental status improved, transferred to floor. Continue regiment.   Monitor Hgb--> stable  Patietn with RUE swelling/hematoma--> elevate/compress. Hold AC.   On oral diet and tube feeds in the MICU as poor po intake. Will attempt to wean off tube. S+S pending.     Hepatology following.

## 2024-09-29 LAB
GLUCOSE BLDC GLUCOMTR-MCNC: 102 MG/DL — HIGH (ref 70–99)
GLUCOSE BLDC GLUCOMTR-MCNC: 102 MG/DL — HIGH (ref 70–99)
GLUCOSE BLDC GLUCOMTR-MCNC: 83 MG/DL — SIGNIFICANT CHANGE UP (ref 70–99)
GLUCOSE BLDC GLUCOMTR-MCNC: 99 MG/DL — SIGNIFICANT CHANGE UP (ref 70–99)

## 2024-09-29 PROCEDURE — 76705 ECHO EXAM OF ABDOMEN: CPT | Mod: 26

## 2024-09-29 PROCEDURE — 99232 SBSQ HOSP IP/OBS MODERATE 35: CPT | Mod: GC

## 2024-09-29 RX ADMIN — CHLORHEXIDINE GLUCONATE ORAL RINSE 1 APPLICATION(S): 1.2 SOLUTION DENTAL at 05:56

## 2024-09-29 RX ADMIN — QUETIAPINE FUMARATE 25 MILLIGRAM(S): 50 TABLET, FILM COATED ORAL at 11:53

## 2024-09-29 RX ADMIN — MUPIROCIN 1 APPLICATION(S): 20 OINTMENT TOPICAL at 18:11

## 2024-09-29 RX ADMIN — THIAMINE HYDROCHLORIDE 100 MILLIGRAM(S): 100 INJECTION, SOLUTION INTRAMUSCULAR; INTRAVENOUS at 11:55

## 2024-09-29 RX ADMIN — QUETIAPINE FUMARATE 25 MILLIGRAM(S): 50 TABLET, FILM COATED ORAL at 00:06

## 2024-09-29 RX ADMIN — MUPIROCIN 1 APPLICATION(S): 20 OINTMENT TOPICAL at 05:56

## 2024-09-29 RX ADMIN — LABETALOL HYDROCHLORIDE 200 MILLIGRAM(S): 200 TABLET, FILM COATED ORAL at 00:06

## 2024-09-29 RX ADMIN — QUETIAPINE FUMARATE 25 MILLIGRAM(S): 50 TABLET, FILM COATED ORAL at 22:31

## 2024-09-29 RX ADMIN — PANTOPRAZOLE SODIUM 40 MILLIGRAM(S): 40 TABLET, DELAYED RELEASE ORAL at 18:11

## 2024-09-29 RX ADMIN — LABETALOL HYDROCHLORIDE 200 MILLIGRAM(S): 200 TABLET, FILM COATED ORAL at 05:56

## 2024-09-29 RX ADMIN — FOLIC ACID 1 MILLIGRAM(S): 1 TABLET ORAL at 11:56

## 2024-09-29 RX ADMIN — LABETALOL HYDROCHLORIDE 200 MILLIGRAM(S): 200 TABLET, FILM COATED ORAL at 15:05

## 2024-09-29 RX ADMIN — Medication 125 MILLILITER(S): at 16:00

## 2024-09-29 RX ADMIN — LACTULOSE 30 GRAM(S): 10 SOLUTION ORAL; RECTAL at 05:54

## 2024-09-29 RX ADMIN — LACTULOSE 30 GRAM(S): 10 SOLUTION ORAL; RECTAL at 00:07

## 2024-09-29 RX ADMIN — LABETALOL HYDROCHLORIDE 200 MILLIGRAM(S): 200 TABLET, FILM COATED ORAL at 22:30

## 2024-09-29 RX ADMIN — LACTULOSE 30 GRAM(S): 10 SOLUTION ORAL; RECTAL at 15:05

## 2024-09-29 RX ADMIN — Medication 17 GRAM(S): at 11:56

## 2024-09-29 RX ADMIN — PANTOPRAZOLE SODIUM 40 MILLIGRAM(S): 40 TABLET, DELAYED RELEASE ORAL at 05:57

## 2024-09-29 NOTE — PROGRESS NOTE ADULT - SUBJECTIVE AND OBJECTIVE BOX
PROGRESS NOTE:     Patient is a 59y old  Male who presents with a chief complaint of Acute Liver Failure (28 Sep 2024 07:24)      SUBJECTIVE / OVERNIGHT EVENTS:    OVERNIGHT: No acute overnight events.      Patient was examined at bedside and feels well this morning. Denies fever, chills, chest pain, SOB, nausea, vomiting. ROS otherwise negative and pt is amenable to current treatment plan.      REVIEW OF SYSTEMS:    CONSTITUTIONAL:  No weakness, fevers, or chills  EYES/ENT:  No visual changes, vertigo, or throat pain   NECK:  No pain or stiffness  RESPIRATORY:  No SOB, cough, wheezing, or hemoptysis  CARDIOVASCULAR:  No chest pain or palpitations  GASTROINTESTINAL:  No abdominal pain, nausea, vomiting, or hematemesis; No diarrhea or constipation; No melena or hematochezia.  GENITOURINARY:  No dysuria, change in frequency, or hematuria  NEUROLOGICAL:  No numbness or weakness  SKIN:  No itching or rashes      MEDICATIONS  (STANDING):  chlorhexidine 4% Liquid 1 Application(s) Topical <User Schedule>  dextrose 5%. 1000 milliLiter(s) (50 mL/Hr) IV Continuous <Continuous>  dextrose 5%. 1000 milliLiter(s) (100 mL/Hr) IV Continuous <Continuous>  dextrose 50% Injectable 12.5 Gram(s) IV Push once  dextrose 50% Injectable 25 Gram(s) IV Push once  dextrose 50% Injectable 25 Gram(s) IV Push once  dextrose Oral Gel 15 Gram(s) Oral once  folic acid Injectable 1 milliGRAM(s) IV Push daily  glucagon  Injectable 1 milliGRAM(s) IntraMuscular once  labetalol 200 milliGRAM(s) Oral three times a day  lactulose Syrup 30 Gram(s) Oral every 8 hours  mupirocin 2% Nasal 1 Application(s) Both Nostrils two times a day  pantoprazole  Injectable 40 milliGRAM(s) IV Push two times a day  QUEtiapine 25 milliGRAM(s) Oral daily  QUEtiapine 25 milliGRAM(s) Oral at bedtime  rifAXIMin 550 milliGRAM(s) Oral two times a day  thiamine 100 milliGRAM(s) Oral daily    MEDICATIONS  (PRN):      CAPILLARY BLOOD GLUCOSE      POCT Blood Glucose.: 102 mg/dL (28 Sep 2024 23:58)  POCT Blood Glucose.: 104 mg/dL (28 Sep 2024 17:33)  POCT Blood Glucose.: 106 mg/dL (28 Sep 2024 12:54)    I&O's Summary    28 Sep 2024 07:01  -  29 Sep 2024 07:00  --------------------------------------------------------  IN: 0 mL / OUT: 1350 mL / NET: -1350 mL        PHYSICAL EXAM:  Vital Signs Last 24 Hrs  T(C): 37 (29 Sep 2024 04:00), Max: 37 (29 Sep 2024 04:00)  T(F): 98.6 (29 Sep 2024 04:00), Max: 98.6 (29 Sep 2024 04:00)  HR: 73 (29 Sep 2024 04:00) (73 - 77)  BP: 125/84 (29 Sep 2024 04:00) (113/76 - 132/71)  BP(mean): --  RR: 18 (29 Sep 2024 04:00) (18 - 18)  SpO2: 96% (29 Sep 2024 04:00) (96% - 100%)    Parameters below as of 29 Sep 2024 04:00  Patient On (Oxygen Delivery Method): room air        CONSTITUTIONAL: NAD; well-developed  HEENT: PERRL, clear conjunctiva  RESPIRATORY: Normal respiratory effort; lungs are clear to auscultation bilaterally; No crackles/rhonchi/wheezing  CARDIOVASCULAR: Regular rate and rhythm, normal S1 and S2, no murmur/rub/gallop; No lower extremity edema; Peripheral pulses are 2+ bilaterally  ABDOMEN: Nontender to palpation, normoactive bowel sounds, no rebound/guarding; No hepatosplenomegaly  MUSCULOSKELETAL: No clubbing or cyanosis of digits; no joint swelling or tenderness to palpation  EXTREMITY: Lower extremities non-tender to palpation; non-erythematous B/L  NEURO: A&Ox3; no focal deficits   PSYCH: Normal mood; affect appropriate    LABS:                        9.0    5.52  )-----------( 158      ( 28 Sep 2024 18:40 )             29.4     09-28    141  |  117[H]  |  14  ----------------------------<  87  4.3   |  14[L]  |  1.19    Ca    8.5      28 Sep 2024 10:10  Phos  3.0     09-28  Mg     1.9     09-28    TPro  7.1  /  Alb  2.4[L]  /  TBili  18.3[H]  /  DBili  x   /  AST  96[H]  /  ALT  101[H]  /  AlkPhos  136[H]  09-28    PT/INR - ( 27 Sep 2024 13:04 )   PT: 17.5 sec;   INR: 1.53 ratio         PTT - ( 27 Sep 2024 13:04 )  PTT:32.8 sec      Urinalysis Basic - ( 28 Sep 2024 10:10 )    Color: x / Appearance: x / SG: x / pH: x  Gluc: 87 mg/dL / Ketone: x  / Bili: x / Urobili: x   Blood: x / Protein: x / Nitrite: x   Leuk Esterase: x / RBC: x / WBC x   Sq Epi: x / Non Sq Epi: x / Bacteria: x          RADIOLOGY & ADDITIONAL TESTS:    N/A PROGRESS NOTE:     Patient is a 59y old  Male who presents with a chief complaint of Acute Liver Failure (28 Sep 2024 07:24)      SUBJECTIVE / OVERNIGHT EVENTS:    No acute overnight events. Patient was examined at bedside and feels well this morning. Denies fever, chills, chest pain, SOB, nausea, vomiting. ROS unable to be elicited d/t pt mental status.    MEDICATIONS  (STANDING):  chlorhexidine 4% Liquid 1 Application(s) Topical <User Schedule>  dextrose 5%. 1000 milliLiter(s) (50 mL/Hr) IV Continuous <Continuous>  dextrose 5%. 1000 milliLiter(s) (100 mL/Hr) IV Continuous <Continuous>  dextrose 50% Injectable 12.5 Gram(s) IV Push once  dextrose 50% Injectable 25 Gram(s) IV Push once  dextrose 50% Injectable 25 Gram(s) IV Push once  dextrose Oral Gel 15 Gram(s) Oral once  folic acid Injectable 1 milliGRAM(s) IV Push daily  glucagon  Injectable 1 milliGRAM(s) IntraMuscular once  labetalol 200 milliGRAM(s) Oral three times a day  lactulose Syrup 30 Gram(s) Oral every 8 hours  mupirocin 2% Nasal 1 Application(s) Both Nostrils two times a day  pantoprazole  Injectable 40 milliGRAM(s) IV Push two times a day  QUEtiapine 25 milliGRAM(s) Oral daily  QUEtiapine 25 milliGRAM(s) Oral at bedtime  rifAXIMin 550 milliGRAM(s) Oral two times a day  thiamine 100 milliGRAM(s) Oral daily    MEDICATIONS  (PRN):      CAPILLARY BLOOD GLUCOSE      POCT Blood Glucose.: 102 mg/dL (28 Sep 2024 23:58)  POCT Blood Glucose.: 104 mg/dL (28 Sep 2024 17:33)  POCT Blood Glucose.: 106 mg/dL (28 Sep 2024 12:54)    I&O's Summary    28 Sep 2024 07:01  -  29 Sep 2024 07:00  --------------------------------------------------------  IN: 0 mL / OUT: 1350 mL / NET: -1350 mL        PHYSICAL EXAM:  Vital Signs Last 24 Hrs  T(C): 37 (29 Sep 2024 04:00), Max: 37 (29 Sep 2024 04:00)  T(F): 98.6 (29 Sep 2024 04:00), Max: 98.6 (29 Sep 2024 04:00)  HR: 73 (29 Sep 2024 04:00) (73 - 77)  BP: 125/84 (29 Sep 2024 04:00) (113/76 - 132/71)  BP(mean): --  RR: 18 (29 Sep 2024 04:00) (18 - 18)  SpO2: 96% (29 Sep 2024 04:00) (96% - 100%)    Parameters below as of 29 Sep 2024 04:00  Patient On (Oxygen Delivery Method): room air        CONSTITUTIONAL: lying comfortably in bed, NAD  HEENT: markedly jaundiced eyes  RESPIRATORY: Normal respiratory effort; lungs are clear to auscultation bilaterally  CARDIOVASCULAR: Regular rate and rhythm  ABDOMEN: tense, distended, nttp  EXTREMITY: RUE hematoma and swelling from the axilla to the fingertips. Entire RUE is tense and 4/5 in all muscle groups.  NEURO: AO2  PSYCH: tremulous voice, poor attention, pt speaks in english and intermittently in [place]    LABS:                        9.0    5.52  )-----------( 158      ( 28 Sep 2024 18:40 )             29.4     09-28    141  |  117[H]  |  14  ----------------------------<  87  4.3   |  14[L]  |  1.19    Ca    8.5      28 Sep 2024 10:10  Phos  3.0     09-28  Mg     1.9     09-28    TPro  7.1  /  Alb  2.4[L]  /  TBili  18.3[H]  /  DBili  x   /  AST  96[H]  /  ALT  101[H]  /  AlkPhos  136[H]  09-28    PT/INR - ( 27 Sep 2024 13:04 )   PT: 17.5 sec;   INR: 1.53 ratio         PTT - ( 27 Sep 2024 13:04 )  PTT:32.8 sec      Urinalysis Basic - ( 28 Sep 2024 10:10 )    Color: x / Appearance: x / SG: x / pH: x  Gluc: 87 mg/dL / Ketone: x  / Bili: x / Urobili: x   Blood: x / Protein: x / Nitrite: x   Leuk Esterase: x / RBC: x / WBC x   Sq Epi: x / Non Sq Epi: x / Bacteria: x          RADIOLOGY & ADDITIONAL TESTS:    N/A

## 2024-09-29 NOTE — PROGRESS NOTE ADULT - PROBLEM SELECTOR PROBLEM 3
understands and agrees to surgery. I will proceed with a robotic assisted laparoscopic possible open right hemicolectomy, possible ostomy. On this date 4/20/2023 I have spent 30 minutes reviewing previous notes, test results and face to face with the patient discussing the diagnosis and importance of compliance with the treatment plan as well as documenting on the day of the visit. Greater than 50% of the time was spent face-to-face with the patient. This time is exclusive of procedures performed. I answered all of  the patient's questions to his/her satisfaction.         Electronically by Eustaquio Carrel, MD, General Surgery  on 4/20/2023 at 10:55 AM      Send copy of H&P to PCP, Lincoln Duran MD and referring physician, Ganesh Shrestha MD      4/20/2023 Fever

## 2024-09-29 NOTE — PROGRESS NOTE ADULT - PROBLEM SELECTOR PLAN 2
Pt with long history of etoh abuse with evidence of hepatic steatosis on imaging.  Appears to be related to a prior ischemic episode   - RUQ US: Distended gallbladder with wall thickening up to 5 mm and trace pericholecystic fluid without evidence of cholelithiasis or biliary dilatation. Negative sonographic Gibson's sign.   - CTAP: Nondistended gallbladder with diffuse nonspecific wall edema. Severe fatty liver.  - labs significant for thrombocytopenia, transaminitis, anemia, elevated bilirubin & decreased fibrinogen, elevated lactate.   Plan  -Continue Thiamine and Folate  - s/p NAC, currently off   -S/P PLEX as above   -Hepatology following  -Continue Lactulose and Miralax  -Continue Rifaximin Pt with long history of etoh abuse with evidence of hepatic steatosis on imaging.  Appears to be related to a prior ischemic episode. Pt now with increased distension and hypotension.     Plan:  - Abd US ordered, pt may require paracentesis   - Albumin x1 today  - Continue Thiamine and Folate  - s/p NAC, currently off   - S/P PLEX as above   - Hepatology following  - Continue Lactulose and Miralax  -Continue Rifaximin

## 2024-09-29 NOTE — PROGRESS NOTE ADULT - ASSESSMENT
Patient is a 59 year old M with PMHx ETOH abuse with frequent admissions for withdrawal & gastritis admitted to ICU for acute on chronic decompensated liver failure & alcohol withdrawal. S/p intubation for airway protection. Started on PLEX for Acute liver failure. Pending further evaluation regarding AMS. Patient is a 59 year old M with PMHx ETOH abuse with frequent admissions for withdrawal & gastritis admitted to ICU for acute on chronic decompensated liver failure & alcohol withdrawal. S/p intubation for airway protection. Started on PLEX for Acute liver failure, and pt is poor candidate for liver transplant. Pt continues to have AMS despite good BM's from lactulose. Abdomen now very distended with hypotension however MAP is 76.

## 2024-09-29 NOTE — PROGRESS NOTE ADULT - PROBLEM SELECTOR PLAN 1
Likely secondary to elevated ammonia level along with liver failure.   -CT head showing concerns for cerebral edema or global hypoxic injury  -CTA and CTV- appear to have no major findings  -MRI did not show any acute issues  -vEEG- no major findings, repeat 9/23 no seizures  - Ammonia improving   -Neurology on board  -S/P PLEX- got 1st round 9/17 and 2nd 9/19, 3rd round 9/20  -Continue Lactulose and Miralax- ammonia improving   -c/w Rifaximin  - c/w Seroquel 25 BID for agitation  -pt currently with NGT, because became hypoglycemic in MICU when only on regular diet.  -consider holding tube feeds and retrialling only regular diet as pt is more alert Likely secondary to elevated ammonia level along with liver failure. CT head showing concerns for cerebral edema or global hypoxic injury. CTA and CTV- appear to have no major findings. MRI did not show any acute issues. vEEG- no major findings, repeat 9/23 no seizures. NGT removed. Ammonia improving on Lactulose      Plan:  - Continue Lactulose; adding Miralax  - c/w Rifaximin  - c/w Seroquel 25 BID for agitation  - S/P PLEX- got 1st round 9/17 and 2nd 9/19, 3rd round 9/20  - Neurology on board

## 2024-09-29 NOTE — PROGRESS NOTE ADULT - PROBLEM SELECTOR PLAN 4
-required 1 u pRBC 9/23  -Hgb dropped from   -1u pRBC on 9/26-> repeat CBC stable   - no plan for scope this admission, per hepatology

## 2024-09-30 DIAGNOSIS — R79.89 OTHER SPECIFIED ABNORMAL FINDINGS OF BLOOD CHEMISTRY: ICD-10-CM

## 2024-09-30 DIAGNOSIS — Z91.89 OTHER SPECIFIED PERSONAL RISK FACTORS, NOT ELSEWHERE CLASSIFIED: ICD-10-CM

## 2024-09-30 LAB
ADD ON TEST-SPECIMEN IN LAB: SIGNIFICANT CHANGE UP
ADD ON TEST-SPECIMEN IN LAB: SIGNIFICANT CHANGE UP
ALBUMIN SERPL ELPH-MCNC: 2.5 G/DL — LOW (ref 3.3–5)
ALP SERPL-CCNC: 136 U/L — HIGH (ref 40–120)
ALT FLD-CCNC: 80 U/L — HIGH (ref 10–45)
ANION GAP SERPL CALC-SCNC: 13 MMOL/L — SIGNIFICANT CHANGE UP (ref 5–17)
APTT BLD: 31.2 SEC — SIGNIFICANT CHANGE UP (ref 24.5–35.6)
AST SERPL-CCNC: 92 U/L — HIGH (ref 10–40)
BILIRUB DIRECT SERPL-MCNC: >10 MG/DL — HIGH (ref 0–0.3)
BILIRUB SERPL-MCNC: 20.5 MG/DL — HIGH (ref 0.2–1.2)
BLD GP AB SCN SERPL QL: NEGATIVE — SIGNIFICANT CHANGE UP
BUN SERPL-MCNC: 14 MG/DL — SIGNIFICANT CHANGE UP (ref 7–23)
CALCIUM SERPL-MCNC: 8.8 MG/DL — SIGNIFICANT CHANGE UP (ref 8.4–10.5)
CHLORIDE SERPL-SCNC: 114 MMOL/L — HIGH (ref 96–108)
CK SERPL-CCNC: 174 U/L — SIGNIFICANT CHANGE UP (ref 30–200)
CO2 SERPL-SCNC: 15 MMOL/L — LOW (ref 22–31)
CREAT SERPL-MCNC: 1.15 MG/DL — SIGNIFICANT CHANGE UP (ref 0.5–1.3)
EGFR: 74 ML/MIN/1.73M2 — SIGNIFICANT CHANGE UP
GLUCOSE BLDC GLUCOMTR-MCNC: 114 MG/DL — HIGH (ref 70–99)
GLUCOSE BLDC GLUCOMTR-MCNC: 205 MG/DL — HIGH (ref 70–99)
GLUCOSE BLDC GLUCOMTR-MCNC: 88 MG/DL — SIGNIFICANT CHANGE UP (ref 70–99)
GLUCOSE BLDC GLUCOMTR-MCNC: 91 MG/DL — SIGNIFICANT CHANGE UP (ref 70–99)
GLUCOSE SERPL-MCNC: 86 MG/DL — SIGNIFICANT CHANGE UP (ref 70–99)
INR BLD: 1.73 RATIO — HIGH (ref 0.85–1.16)
LIDOCAIN IGE QN: 975 U/L — HIGH (ref 7–60)
MAGNESIUM SERPL-MCNC: 1.7 MG/DL — SIGNIFICANT CHANGE UP (ref 1.6–2.6)
PHOSPHATE SERPL-MCNC: 2.9 MG/DL — SIGNIFICANT CHANGE UP (ref 2.5–4.5)
POTASSIUM SERPL-MCNC: 4.1 MMOL/L — SIGNIFICANT CHANGE UP (ref 3.5–5.3)
POTASSIUM SERPL-SCNC: 4.1 MMOL/L — SIGNIFICANT CHANGE UP (ref 3.5–5.3)
PROT SERPL-MCNC: 7.4 G/DL — SIGNIFICANT CHANGE UP (ref 6–8.3)
PROTHROM AB SERPL-ACNC: 19.6 SEC — HIGH (ref 9.9–13.4)
RH IG SCN BLD-IMP: POSITIVE — SIGNIFICANT CHANGE UP
SODIUM SERPL-SCNC: 142 MMOL/L — SIGNIFICANT CHANGE UP (ref 135–145)

## 2024-09-30 PROCEDURE — 71045 X-RAY EXAM CHEST 1 VIEW: CPT | Mod: 26

## 2024-09-30 PROCEDURE — 73201 CT UPPER EXTREMITY W/DYE: CPT | Mod: 26,RT

## 2024-09-30 PROCEDURE — 72125 CT NECK SPINE W/O DYE: CPT | Mod: 26

## 2024-09-30 PROCEDURE — 99233 SBSQ HOSP IP/OBS HIGH 50: CPT

## 2024-09-30 PROCEDURE — 74177 CT ABD & PELVIS W/CONTRAST: CPT | Mod: 26

## 2024-09-30 PROCEDURE — 99232 SBSQ HOSP IP/OBS MODERATE 35: CPT

## 2024-09-30 RX ADMIN — MUPIROCIN 1 APPLICATION(S): 20 OINTMENT TOPICAL at 18:59

## 2024-09-30 RX ADMIN — Medication 17 GRAM(S): at 10:48

## 2024-09-30 RX ADMIN — FOLIC ACID 1 MILLIGRAM(S): 1 TABLET ORAL at 10:47

## 2024-09-30 RX ADMIN — LACTULOSE 30 GRAM(S): 10 SOLUTION ORAL; RECTAL at 22:33

## 2024-09-30 RX ADMIN — QUETIAPINE FUMARATE 25 MILLIGRAM(S): 50 TABLET, FILM COATED ORAL at 12:51

## 2024-09-30 RX ADMIN — LACTULOSE 30 GRAM(S): 10 SOLUTION ORAL; RECTAL at 13:19

## 2024-09-30 RX ADMIN — LABETALOL HYDROCHLORIDE 200 MILLIGRAM(S): 200 TABLET, FILM COATED ORAL at 22:33

## 2024-09-30 RX ADMIN — THIAMINE HYDROCHLORIDE 100 MILLIGRAM(S): 100 INJECTION, SOLUTION INTRAMUSCULAR; INTRAVENOUS at 10:49

## 2024-09-30 RX ADMIN — LABETALOL HYDROCHLORIDE 200 MILLIGRAM(S): 200 TABLET, FILM COATED ORAL at 06:20

## 2024-09-30 RX ADMIN — CHLORHEXIDINE GLUCONATE ORAL RINSE 1 APPLICATION(S): 1.2 SOLUTION DENTAL at 06:20

## 2024-09-30 RX ADMIN — MUPIROCIN 1 APPLICATION(S): 20 OINTMENT TOPICAL at 06:21

## 2024-09-30 RX ADMIN — QUETIAPINE FUMARATE 25 MILLIGRAM(S): 50 TABLET, FILM COATED ORAL at 22:35

## 2024-09-30 RX ADMIN — PANTOPRAZOLE SODIUM 40 MILLIGRAM(S): 40 TABLET, DELAYED RELEASE ORAL at 18:58

## 2024-09-30 RX ADMIN — LABETALOL HYDROCHLORIDE 200 MILLIGRAM(S): 200 TABLET, FILM COATED ORAL at 13:19

## 2024-09-30 RX ADMIN — PANTOPRAZOLE SODIUM 40 MILLIGRAM(S): 40 TABLET, DELAYED RELEASE ORAL at 06:20

## 2024-09-30 NOTE — PROGRESS NOTE ADULT - ATTENDING COMMENTS
58 y/o male, PMH ETOH abuse, gastritis, PUD, hx of hypoxic respiratory failure requiring intubation due to aspiration PNA (most recent intubation Aril 2020), presented to [facility] on 9/13 c/o etoh w/d, abd pain, N/V. LFTs began increasing, started on NAC gtt. Pt was transferred to [facility] on 9/15 for evaluation of acute liver failure by Hepatology.     Admitted to the MICU, was on NAC gtt from 9/15-9/23. Per hepatology, pt is unlikely a candidate for LT due to multiple admissions in the past with alcohol withdrawal. Pt was intubated on 9/17, extubated 9/23. Pt received PLEX x3, mannitol, hypertonic saline for hyperammonemia and cerebral edema. Pt also given lactulose, ammonia down to 42. Mental status improved, transferred to floor. Continue regiment.   Monitor Hgb--> stable  Patient with RUE swelling/hematoma--> elevate/compress. Hold AC. Still with significant swelling, now with R wrist drop, concerning for radial nerve compression due to swelling. Low threshold for ortho consult for compartment syndrome.   On oral diet and tube feeds in the MICU as poor po intake. Will attempt to wean off tube. S+S pending.     Patient's abdomen is more distended. Currently on Lactulose and Miralax. U/S abdomen ordered which shows small amount of ascites. Plan for diagnostic paracentesis. Hepatology following--> followup further recs.    Discussed with team. Rest as above.     The necessity of the time spent during the encounter on this date of service was due to:   - Ordering, reviewing, and interpreting labs, testing, and imaging  - Independently obtaining a review of systems and performing a physical exam  - Reviewing prior hospitalization and where necessary, outpatient records  - Reviewing consultant recommendations/communicating with consultants  - Counselling and educating patient and family regarding interpretation of aforementioned items and plan of care    Time-based billing (NON-critical care). Total minutes spent: 60

## 2024-09-30 NOTE — PROGRESS NOTE ADULT - PROBLEM SELECTOR PLAN 4
-Pt with episode of fever  -Zosyn ( 9/23-9/27)   -s/p Vanc  -blood cx 9/19 negative -Pt with episode of fever  -Zosyn ( 9/23-9/27)   -s/p Vanc  -blood cx 9/19 negative  - monitor off abx

## 2024-09-30 NOTE — PROGRESS NOTE ADULT - PROBLEM SELECTOR PLAN 1
Likely secondary to elevated ammonia level along with liver failure. CT head showing concerns for cerebral edema or global hypoxic injury. CTA and CTV- appear to have no major findings. MRI did not show any acute issues. vEEG- no major findings, repeat 9/23 no seizures. NGT removed. Ammonia improving on Lactulose      Plan:  - Continue Lactulose; adding Miralax  - c/w Rifaximin  - c/w Seroquel 25 BID for agitation  - S/P PLEX- got 1st round 9/17 and 2nd 9/19, 3rd round 9/20  - Neurology on board Likely secondary to elevated ammonia level along with liver failure. CT head showing concerns for cerebral edema or global hypoxic injury. CTA and CTV- appear to have no major findings. MRI did not show any acute issues. vEEG- no major findings, repeat 9/23 no seizures. NGT removed. Ammonia improving on Lactulose      Plan:  - Continue Lactulose  - c/w miralax  - c/w Rifaximin  - c/w Seroquel 25 BID for agitation  - S/P PLEX- got 1st round 9/17 and 2nd 9/19, 3rd round 9/20  - Neurology on board

## 2024-09-30 NOTE — PROGRESS NOTE ADULT - SUBJECTIVE AND OBJECTIVE BOX
DATE OF SERVICE: 09-30-24 @ 07:25    Patient is a 59y old  Male who presents with a chief complaint of Acute Liver Failure (29 Sep 2024 07:08)      INTERVAL/OVERNIGHT:    SUBJECTIVE:    MEDICATIONS  (STANDING):  chlorhexidine 4% Liquid 1 Application(s) Topical <User Schedule>  dextrose 5%. 1000 milliLiter(s) (50 mL/Hr) IV Continuous <Continuous>  dextrose 5%. 1000 milliLiter(s) (100 mL/Hr) IV Continuous <Continuous>  dextrose 50% Injectable 12.5 Gram(s) IV Push once  dextrose 50% Injectable 25 Gram(s) IV Push once  dextrose 50% Injectable 25 Gram(s) IV Push once  dextrose Oral Gel 15 Gram(s) Oral once  folic acid Injectable 1 milliGRAM(s) IV Push daily  glucagon  Injectable 1 milliGRAM(s) IntraMuscular once  labetalol 200 milliGRAM(s) Oral three times a day  lactulose Syrup 30 Gram(s) Oral every 8 hours  mupirocin 2% Nasal 1 Application(s) Both Nostrils two times a day  pantoprazole  Injectable 40 milliGRAM(s) IV Push two times a day  polyethylene glycol 3350 17 Gram(s) Oral daily  QUEtiapine 25 milliGRAM(s) Oral daily  QUEtiapine 25 milliGRAM(s) Oral at bedtime  rifAXIMin 550 milliGRAM(s) Oral two times a day  thiamine 100 milliGRAM(s) Oral daily    MEDICATIONS  (PRN):      Vital Signs Last 24 Hrs  T(C): 37.3 (30 Sep 2024 03:50), Max: 37.3 (30 Sep 2024 03:50)  T(F): 99.1 (30 Sep 2024 03:50), Max: 99.1 (30 Sep 2024 03:50)  HR: 78 (30 Sep 2024 03:50) (66 - 78)  BP: 123/65 (30 Sep 2024 03:50) (90/69 - 125/72)  BP(mean): --  RR: 16 (30 Sep 2024 03:50) (16 - 18)  SpO2: 97% (30 Sep 2024 03:50) (96% - 97%)    Parameters below as of 30 Sep 2024 03:50  Patient On (Oxygen Delivery Method): room air      CAPILLARY BLOOD GLUCOSE      POCT Blood Glucose.: 102 mg/dL (29 Sep 2024 21:22)  POCT Blood Glucose.: 99 mg/dL (29 Sep 2024 17:33)  POCT Blood Glucose.: 83 mg/dL (29 Sep 2024 12:46)    I&O's Summary    29 Sep 2024 07:01  -  30 Sep 2024 07:00  --------------------------------------------------------  IN: 600 mL / OUT: 150 mL / NET: 450 mL        PHYSICAL EXAM:  GENERAL: NAD,  HEAD:  Atraumatic, Normocephalic  EYES: anicteric  NECK: Supple, No JVD  CHEST/LUNG: Clear to auscultation bilaterally; No wheeze  HEART: Regular rate and rhythm; No murmurs, rubs, or gallops  ABDOMEN: Soft, Nontender, Nondistended  EXTREMITIES: No b/l LE edema  NEUROLOGY: A&Ox3, non-focal  SKIN: No rashes or lesions    LABS:                        9.0    5.52  )-----------( 158      ( 28 Sep 2024 18:40 )             29.4     09-28    141  |  117[H]  |  14  ----------------------------<  87  4.3   |  14[L]  |  1.19    Ca    8.5      28 Sep 2024 10:10  Phos  3.0     09-28  Mg     1.9     09-28    TPro  7.1  /  Alb  2.4[L]  /  TBili  18.3[H]  /  DBili  x   /  AST  96[H]  /  ALT  101[H]  /  AlkPhos  136[H]  09-28          Urinalysis Basic - ( 28 Sep 2024 10:10 )    Color: x / Appearance: x / SG: x / pH: x  Gluc: 87 mg/dL / Ketone: x  / Bili: x / Urobili: x   Blood: x / Protein: x / Nitrite: x   Leuk Esterase: x / RBC: x / WBC x   Sq Epi: x / Non Sq Epi: x / Bacteria: x        RADIOLOGY & ADDITIONAL TESTS:    Imaging Personally Reviewed:    Consultant(s) Notes Reviewed:      Care Discussed with Consultants/Other Providers:   DATE OF SERVICE: 09-30-24 @ 07:25    Patient is a 59y old  Male who presents with a chief complaint of Acute Liver Failure (29 Sep 2024 07:08)      INTERVAL/OVERNIGHT: NAOE    SUBJECTIVE: Patient limited historian. Endorses abd discomfort and swelling. Denies chest pain ,SOB, fevers    MEDICATIONS  (STANDING):  chlorhexidine 4% Liquid 1 Application(s) Topical <User Schedule>  dextrose 5%. 1000 milliLiter(s) (50 mL/Hr) IV Continuous <Continuous>  dextrose 5%. 1000 milliLiter(s) (100 mL/Hr) IV Continuous <Continuous>  dextrose 50% Injectable 12.5 Gram(s) IV Push once  dextrose 50% Injectable 25 Gram(s) IV Push once  dextrose 50% Injectable 25 Gram(s) IV Push once  dextrose Oral Gel 15 Gram(s) Oral once  folic acid Injectable 1 milliGRAM(s) IV Push daily  glucagon  Injectable 1 milliGRAM(s) IntraMuscular once  labetalol 200 milliGRAM(s) Oral three times a day  lactulose Syrup 30 Gram(s) Oral every 8 hours  mupirocin 2% Nasal 1 Application(s) Both Nostrils two times a day  pantoprazole  Injectable 40 milliGRAM(s) IV Push two times a day  polyethylene glycol 3350 17 Gram(s) Oral daily  QUEtiapine 25 milliGRAM(s) Oral daily  QUEtiapine 25 milliGRAM(s) Oral at bedtime  rifAXIMin 550 milliGRAM(s) Oral two times a day  thiamine 100 milliGRAM(s) Oral daily    MEDICATIONS  (PRN):      Vital Signs Last 24 Hrs  T(C): 37.3 (30 Sep 2024 03:50), Max: 37.3 (30 Sep 2024 03:50)  T(F): 99.1 (30 Sep 2024 03:50), Max: 99.1 (30 Sep 2024 03:50)  HR: 78 (30 Sep 2024 03:50) (66 - 78)  BP: 123/65 (30 Sep 2024 03:50) (90/69 - 125/72)  BP(mean): --  RR: 16 (30 Sep 2024 03:50) (16 - 18)  SpO2: 97% (30 Sep 2024 03:50) (96% - 97%)    Parameters below as of 30 Sep 2024 03:50  Patient On (Oxygen Delivery Method): room air      CAPILLARY BLOOD GLUCOSE      POCT Blood Glucose.: 102 mg/dL (29 Sep 2024 21:22)  POCT Blood Glucose.: 99 mg/dL (29 Sep 2024 17:33)  POCT Blood Glucose.: 83 mg/dL (29 Sep 2024 12:46)    I&O's Summary    29 Sep 2024 07:01  -  30 Sep 2024 07:00  --------------------------------------------------------  IN: 600 mL / OUT: 150 mL / NET: 450 mL        PHYSICAL EXAM:  GENERAL: NAD,  HEAD:  Atraumatic, Normocephalic  EYES: +scleral icterus  NECK: Supple, No JVD  CHEST/LUNG: Clear to auscultation bilaterally; No wheeze  HEART: Regular rate and rhythm; No murmurs, rubs, or gallops  ABDOMEN: Soft, distended, nontender  EXTREMITIES: No b/l LE edema  NEUROLOGY: A&Ox2, +R wrist drop of unknown chronicity  SKIN: +jaundice    LABS:                        9.0    5.52  )-----------( 158      ( 28 Sep 2024 18:40 )             29.4     09-28    141  |  117[H]  |  14  ----------------------------<  87  4.3   |  14[L]  |  1.19    Ca    8.5      28 Sep 2024 10:10  Phos  3.0     09-28  Mg     1.9     09-28    TPro  7.1  /  Alb  2.4[L]  /  TBili  18.3[H]  /  DBili  x   /  AST  96[H]  /  ALT  101[H]  /  AlkPhos  136[H]  09-28          Urinalysis Basic - ( 28 Sep 2024 10:10 )    Color: x / Appearance: x / SG: x / pH: x  Gluc: 87 mg/dL / Ketone: x  / Bili: x / Urobili: x   Blood: x / Protein: x / Nitrite: x   Leuk Esterase: x / RBC: x / WBC x   Sq Epi: x / Non Sq Epi: x / Bacteria: x        RADIOLOGY & ADDITIONAL TESTS:    Imaging Personally Reviewed:    Consultant(s) Notes Reviewed:      Care Discussed with Consultants/Other Providers:

## 2024-09-30 NOTE — PROGRESS NOTE ADULT - PROBLEM SELECTOR PLAN 3
AM cortisol low, unclear significance. Hepatoadrenal syndrome?  - defer endo consult for now, hypotension and hypoglycemia seem to be improving  - low utility for steroids in hepatoadrenal syndrome?

## 2024-09-30 NOTE — CONSULT NOTE ADULT - SUBJECTIVE AND OBJECTIVE BOX
Interventional Radiology    Evaluate for Procedure: Diagnostic Paracentesis     HPI: 59 year old male with PMHx ETOH abuse with frequent admissions for withdrawal & gastritis admitted to ICU for acute on chronic decompensated liver failure & alcohol withdrawal. S/p intubation for airway protection (9/17-9/23). s/p PLEX for Acute liver failure, and pt is poor candidate for liver transplant. Pt continues to have AMS despite good BM's from lactulose. Abdomen now very distended, SBO/ileus vs. ascites (small to mod ascites on US). IR now consulted for diagnostic paracentesis.     Allergies: No Known Allergies    Medications (Abx/Cardiac/Anticoagulation/Blood Products)    labetalol: 200 milliGRAM(s) Oral (09-30 @ 06:20)  rifAXIMin: 550 milliGRAM(s) Oral (09-30 @ 06:20)    Data:  T(C): 37.3  HR: 78  BP: 123/65  RR: 16  SpO2: 97%    -WBC 5.52 / HgB 9.0 / Hct 29.4 / Plt 158  -Na 141 / Cl 117 / BUN 14 / Glucose 87  -K 4.3 / CO2 14 / Cr 1.19  - / Alk Phos 136 / T.Bili 18.3  -INR 1.53 / PTT 32.8    Radiology:   < from: US Abdomen Limited (09.29.24 @ 22:18) >    INTERPRETATION:  CLINICAL INFORMATION: Liver failure with distention    COMPARISON: CT abdomen pelvis 9/13/2024    TECHNIQUE: Limitedultrasound of the abdomen to evaluate for ascites.    IMPRESSION:    Small to moderate ascites, most prominent of the right hemiabdomen.    Few distended bowel loops are identified, incompletely characterized on   CT. Recommend CT abdomen pelvis for further evaluation given the history   of distention. Dr. Lees discussed these findings with Dr. Artur Bryson   on 9/29/2024 at 10:53 PM, with read back.    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    < end of copied text >    Assessment/Plan:   59 year old male with PMHx ETOH abuse with frequent admissions for withdrawal & gastritis admitted to ICU for acute on chronic decompensated liver failure & alcohol withdrawal. S/p intubation for airway protection (9/17-9/23). s/p PLEX for Acute liver failure, and pt is poor candidate for liver transplant. Pt continues to have AMS despite good BM's from lactulose. Abdomen now very distended, SBO/ileus vs. ascites (small to mod ascites on US). IR now consulted for diagnostic paracentesis.     - case reviewed and approved for diagnostic paracentesis   - please place IR procedure order under RIVKA Antonio (free text)   - STAT labs in AM (cbc,coags, bmp, T&S)  - hold AC  - d/w primary team

## 2024-09-30 NOTE — PROGRESS NOTE ADULT - ASSESSMENT
Patient is a 59 year old M with PMHx ETOH abuse with frequent admissions for withdrawal & gastritis admitted to ICU for acute on chronic decompensated liver failure & alcohol withdrawal. S/p intubation for airway protection (9/17-9/23). s/p PLEX for Acute liver failure, and pt is poor candidate for liver transplant. Pt continues to have AMS despite good BM's from lactulose. Abdomen now very distended, SBO/ileus vs. ascites (small to mod ascites on US). Patient is a 59 year old M with PMHx ETOH abuse with frequent admissions for withdrawal & gastritis admitted to ICU for acute on chronic decompensated liver failure & alcohol withdrawal. S/p intubation for airway protection (9/17-9/23). s/p PLEX for Acute liver failure, and pt is poor candidate for liver transplant. Pt continues to have AMS despite good BM's from lactulose. Abdomen now distended, SBO/ileus vs. ascites (small to mod ascites on US).

## 2024-09-30 NOTE — PROGRESS NOTE ADULT - ASSESSMENT
59 years old male with h/o chronic ETOH abuse and dependence (1 bottle of Vodka a day) with long standing hx of alcohol withdrawal and DTs with frequent hospital/ICU admissions, alcoholic gastritis/esophagitis, PUD, HLD, hx of hypoxic respiratory failure requiring intubation due to aspiration PNA (most recent intubation Aril 2020),  staph PNA, COVID-19 infection Dec 2020 presented with abd pain at the OSH.    #Anemia  Differential - gastritis, PUD, AVM/Angiodysplasia, r/o malignancy  #Iron deficiency  #fatty liver disease  #Altered mental status/Encephalopathy, improved  #ROSA, improved  #Acute liver failure vs acute on chronic --   background h/o severe fatty liver presenting with encephalopathy req intubation), markely elevated liver enzymes which has shown remarkable improvement  - Calculated MELD3 score on 9/16 34--->9/23 25, 09/26 27 ---> 09/30 (29)  - Presented with AMS at OSH. Had multiple hospitalizations in the past for alcohol withdrawal and DT per records. Unclear last alcohol drink. His alcohol level this admission was <10 and Tylenol level was 42 on admission. Intubated with findings of cerebral edema and concern for group home. Now extubated on 9/23 with improvement in mental status s/p Plexx3, mannitol and hypertonic saline   - Significant improvement in liver enzymes  - MRSA pcr pos, neg bcx.  - Other lab serologies: CMV, EBV PCR neg, Hep B/C neg.     Recommendations:   - Plan for EGD tomorrow morning  - Transfuse to keep hbg >7  - CMP, phos, INR BID daily  - Ct electrolyte repletion   - Keep MAP >65   - Please repeat infectious work up (Get bcx, Cr, UA, repeat paracentesis with asites fluid analysis). If all negative. Due to uptrend bilirubin and history alcohol use, High MDF score of 49, will consider starting him on steroids.   - Per IR - for para tomorrow.   - Outpatient colonoscopy cancer screening  - keep NPO for now after midnight  - Patient will need to get Fibroscan outpatient  - Outpatient hepatology follow up  - Continue with lactulose enema, goal 2-3 BMs per day.       Liver transplant - Patient is unlikely a candidate for LT due to multiple admissions in the past with alcohol withdrawal.       Griselda Hallman, PGY4  Gastroenterology and Hepatology  Available on TEAMS    For non urgent consult, please email [email or web address] (For [facility]) or [email or web address] (For GISELA)  Long range page number [phone]. Short range [phone]

## 2024-09-30 NOTE — PROGRESS NOTE ADULT - PROBLEM SELECTOR PLAN 9
DVT ppx: hold ac for hematoma    DIet: Diet w/  TF    Dispo: pending re-eval w/ PT DVT ppx: hold ac for hematoma  DIet: DASH  Dispo: pending re-eval w/ PT

## 2024-09-30 NOTE — PROGRESS NOTE ADULT - ATTENDING COMMENTS
The patient is a 59-year-old male with a history of chronic alcohol abuse, frequent hospitalizations for alcohol withdrawal and delirium tremens, alcoholic gastritis, peptic ulcer disease, and fatty liver disease. He was admitted with abdominal pain and altered mental status, requiring intubation for acute liver failure and cerebral edema. His alcohol level was <10 on admission, and he was treated with mannitol, hypertonic saline, and plasmapheresis. He is now extubated with improved mental status and liver enzymes. His current MELD score is 29.    The patient has anemia, likely from chronic gastrointestinal issues (gastritis, PUD, angiodysplasia), and iron deficiency. An EGD is planned for further evaluation. There is a concern for infection with positive MRSA PCR, though blood cultures are negative. He will undergo a repeat infectious workup and paracentesis. Steroid therapy may be considered due to his high Maddrey’s Discriminant Function score, pending infectious workup results.    Liver transplant is unlikely due to his history of frequent admissions related to alcohol dependence. Recommendations include transfusion to keep hemoglobin >7, close monitoring of electrolytes and liver function, and outpatient hepatology follow-up.

## 2024-09-30 NOTE — PROGRESS NOTE ADULT - SUBJECTIVE AND OBJECTIVE BOX
Patient seen and evaluated  Denies any complaints  Hb down trended over the weekend to 6.7  Per patient - denies melena or hematemesis, no abdominal pain        OBJECTIVE:    VITAL SIGNS:  ICU Vital Signs Last 24 Hrs  T(C): 36.7 (30 Sep 2024 11:46), Max: 37.3 (30 Sep 2024 03:50)  T(F): 98 (30 Sep 2024 11:46), Max: 99.1 (30 Sep 2024 03:50)  HR: 77 (30 Sep 2024 11:46) (74 - 78)  BP: 118/72 (30 Sep 2024 11:46) (113/65 - 123/65)  BP(mean): --  ABP: --  ABP(mean): --  RR: 18 (30 Sep 2024 11:46) (16 - 18)  SpO2: 96% (30 Sep 2024 11:46) (96% - 97%)    O2 Parameters below as of 30 Sep 2024 11:46  Patient On (Oxygen Delivery Method): room air              09-29 @ 07:01  -  09-30 @ 07:00  --------------------------------------------------------  IN: 600 mL / OUT: 150 mL / NET: 450 mL    09-30 @ 07:01  -  09-30 @ 20:00  --------------------------------------------------------  IN: 0 mL / OUT: 150 mL / NET: -150 mL        PHYSICAL EXAM:    General: NAD  HEENT: NC/AT  Neck: supple  Respiratory: Regular RR, no increase in WOB  Cardiovascular: RRR  Abdomen: soft, NT/ND; +BS x4  Extremities: WWP, 2+ peripheral pulses b/l  Skin: normal color and turgor; no rash  Neurological: A&OX    MEDICATIONS:  MEDICATIONS  (STANDING):  chlorhexidine 4% Liquid 1 Application(s) Topical <User Schedule>  dextrose 5%. 1000 milliLiter(s) (100 mL/Hr) IV Continuous <Continuous>  dextrose 5%. 1000 milliLiter(s) (50 mL/Hr) IV Continuous <Continuous>  dextrose 50% Injectable 12.5 Gram(s) IV Push once  dextrose 50% Injectable 25 Gram(s) IV Push once  dextrose 50% Injectable 25 Gram(s) IV Push once  dextrose Oral Gel 15 Gram(s) Oral once  folic acid Injectable 1 milliGRAM(s) IV Push daily  glucagon  Injectable 1 milliGRAM(s) IntraMuscular once  labetalol 200 milliGRAM(s) Oral three times a day  lactulose Syrup 30 Gram(s) Oral every 8 hours  mupirocin 2% Nasal 1 Application(s) Both Nostrils two times a day  pantoprazole  Injectable 40 milliGRAM(s) IV Push two times a day  polyethylene glycol 3350 17 Gram(s) Oral daily  QUEtiapine 25 milliGRAM(s) Oral daily  QUEtiapine 25 milliGRAM(s) Oral at bedtime  rifAXIMin 550 milliGRAM(s) Oral two times a day  thiamine 100 milliGRAM(s) Oral daily    MEDICATIONS  (PRN):      ALLERGIES:  Allergies    No Known Allergies    Intolerances        LABS:    09-30    142  |  114[H]  |  14  ----------------------------<  86  4.1   |  15[L]  |  1.15    Ca    8.8      30 Sep 2024 10:15  Phos  2.9     09-30  Mg     1.7     09-30    TPro  7.4  /  Alb  2.5[L]  /  TBili  20.5[H]  /  DBili  >10.0[H]  /  AST  92[H]  /  ALT  80[H]  /  AlkPhos  136[H]  09-30    PT/INR - ( 30 Sep 2024 10:15 )   PT: 19.6 sec;   INR: 1.73 ratio         PTT - ( 30 Sep 2024 10:15 )  PTT:31.2 sec  Urinalysis Basic - ( 30 Sep 2024 10:15 )    Color: x / Appearance: x / SG: x / pH: x  Gluc: 86 mg/dL / Ketone: x  / Bili: x / Urobili: x   Blood: x / Protein: x / Nitrite: x   Leuk Esterase: x / RBC: x / WBC x   Sq Epi: x / Non Sq Epi: x / Bacteria: x

## 2024-09-30 NOTE — PROGRESS NOTE ADULT - PROBLEM SELECTOR PLAN 2
Pt with long history of etoh abuse with evidence of hepatic steatosis on imaging.  Appears to be related to a prior ischemic episode. Pt now with increased distension and hypotension.     Plan:  - New onset Small to mod ascites on US; never been tapped before  - IR consult for diagnostic tap  - Continue Thiamine and Folate  - s/p NAC, currently off   - S/P PLEX as above   - Hepatology following  - Continue Lactulose and Miralax  -Continue Rifaximin Pt with long history of etoh abuse with evidence of hepatic steatosis on imaging.  Appears to be related to a prior ischemic episode. Pt now with increased distension and hypotension.     Plan:  - New onset Small to mod ascites on US; never been tapped before  - IR consult for diagnostic tap, to be done after CT AP  - Continue Thiamine and Folate  - s/p NAC, currently off   - S/P PLEX as above   - Hepatology following  - Continue Lactulose and Miralax  -Continue Rifaximin

## 2024-10-01 ENCOUNTER — RESULT REVIEW (OUTPATIENT)
Age: 59
End: 2024-10-01

## 2024-10-01 DIAGNOSIS — M21.331 WRIST DROP, RIGHT WRIST: ICD-10-CM

## 2024-10-01 LAB
ALBUMIN FLD-MCNC: 1.1 G/DL — SIGNIFICANT CHANGE UP
ALBUMIN SERPL ELPH-MCNC: 2.3 G/DL — LOW (ref 3.3–5)
ALP SERPL-CCNC: 135 U/L — HIGH (ref 40–120)
ALT FLD-CCNC: 64 U/L — HIGH (ref 10–45)
ANION GAP SERPL CALC-SCNC: 12 MMOL/L — SIGNIFICANT CHANGE UP (ref 5–17)
APPEARANCE UR: CLEAR — SIGNIFICANT CHANGE UP
APTT BLD: 34.6 SEC — SIGNIFICANT CHANGE UP (ref 24.5–35.6)
AST SERPL-CCNC: 77 U/L — HIGH (ref 10–40)
B PERT IGG+IGM PNL SER: CLEAR — SIGNIFICANT CHANGE UP
BASOPHILS # BLD AUTO: 0.04 K/UL — SIGNIFICANT CHANGE UP (ref 0–0.2)
BASOPHILS NFR BLD AUTO: 0.8 % — SIGNIFICANT CHANGE UP (ref 0–2)
BILIRUB SERPL-MCNC: 18.4 MG/DL — HIGH (ref 0.2–1.2)
BILIRUB UR-MCNC: ABNORMAL
BUN SERPL-MCNC: 11 MG/DL — SIGNIFICANT CHANGE UP (ref 7–23)
CALCIUM SERPL-MCNC: 8.2 MG/DL — LOW (ref 8.4–10.5)
CHLORIDE SERPL-SCNC: 112 MMOL/L — HIGH (ref 96–108)
CO2 SERPL-SCNC: 15 MMOL/L — LOW (ref 22–31)
COLOR FLD: YELLOW
COLOR SPEC: SIGNIFICANT CHANGE UP
CREAT SERPL-MCNC: 1.25 MG/DL — SIGNIFICANT CHANGE UP (ref 0.5–1.3)
DIFF PNL FLD: NEGATIVE — SIGNIFICANT CHANGE UP
EGFR: 67 ML/MIN/1.73M2 — SIGNIFICANT CHANGE UP
EOSINOPHIL # BLD AUTO: 0.15 K/UL — SIGNIFICANT CHANGE UP (ref 0–0.5)
EOSINOPHIL NFR BLD AUTO: 3.1 % — SIGNIFICANT CHANGE UP (ref 0–6)
FLUID INTAKE SUBSTANCE CLASS: SIGNIFICANT CHANGE UP
GLUCOSE BLDC GLUCOMTR-MCNC: 118 MG/DL — HIGH (ref 70–99)
GLUCOSE FLD-MCNC: 104 MG/DL — SIGNIFICANT CHANGE UP
GLUCOSE SERPL-MCNC: 90 MG/DL — SIGNIFICANT CHANGE UP (ref 70–99)
GLUCOSE UR QL: NEGATIVE MG/DL — SIGNIFICANT CHANGE UP
GRAM STN FLD: SIGNIFICANT CHANGE UP
HCT VFR BLD CALC: 22.3 % — LOW (ref 39–50)
HGB BLD-MCNC: 7.5 G/DL — LOW (ref 13–17)
IMM GRANULOCYTES NFR BLD AUTO: 0.4 % — SIGNIFICANT CHANGE UP (ref 0–0.9)
INR BLD: 1.97 RATIO — HIGH (ref 0.85–1.16)
KETONES UR-MCNC: NEGATIVE MG/DL — SIGNIFICANT CHANGE UP
LDH SERPL L TO P-CCNC: 142 U/L — SIGNIFICANT CHANGE UP
LEUKOCYTE ESTERASE UR-ACNC: NEGATIVE — SIGNIFICANT CHANGE UP
LYMPHOCYTES # BLD AUTO: 1.23 K/UL — SIGNIFICANT CHANGE UP (ref 1–3.3)
LYMPHOCYTES # BLD AUTO: 25.4 % — SIGNIFICANT CHANGE UP (ref 13–44)
LYMPHOCYTES # FLD: 16 % — SIGNIFICANT CHANGE UP
MAGNESIUM SERPL-MCNC: 1.6 MG/DL — SIGNIFICANT CHANGE UP (ref 1.6–2.6)
MCHC RBC-ENTMCNC: 26.6 PG — LOW (ref 27–34)
MCHC RBC-ENTMCNC: 33.6 GM/DL — SIGNIFICANT CHANGE UP (ref 32–36)
MCV RBC AUTO: 79.1 FL — LOW (ref 80–100)
MESOTHL CELL # FLD: 6 % — SIGNIFICANT CHANGE UP
MONOCYTES # BLD AUTO: 0.4 K/UL — SIGNIFICANT CHANGE UP (ref 0–0.9)
MONOCYTES NFR BLD AUTO: 8.2 % — SIGNIFICANT CHANGE UP (ref 2–14)
MONOS+MACROS # FLD: 29 % — SIGNIFICANT CHANGE UP
NEUTROPHILS # BLD AUTO: 3.01 K/UL — SIGNIFICANT CHANGE UP (ref 1.8–7.4)
NEUTROPHILS NFR BLD AUTO: 62.1 % — SIGNIFICANT CHANGE UP (ref 43–77)
NEUTROPHILS-BODY FLUID: 49 % — SIGNIFICANT CHANGE UP
NITRITE UR-MCNC: NEGATIVE — SIGNIFICANT CHANGE UP
NRBC # BLD: 0 /100 WBCS — SIGNIFICANT CHANGE UP (ref 0–0)
PH UR: 6 — SIGNIFICANT CHANGE UP (ref 5–8)
PHOSPHATE SERPL-MCNC: 3 MG/DL — SIGNIFICANT CHANGE UP (ref 2.5–4.5)
PLATELET # BLD AUTO: 179 K/UL — SIGNIFICANT CHANGE UP (ref 150–400)
POTASSIUM SERPL-MCNC: 3.7 MMOL/L — SIGNIFICANT CHANGE UP (ref 3.5–5.3)
POTASSIUM SERPL-SCNC: 3.7 MMOL/L — SIGNIFICANT CHANGE UP (ref 3.5–5.3)
PROT FLD-MCNC: 2.5 G/DL — SIGNIFICANT CHANGE UP
PROT SERPL-MCNC: 6.8 G/DL — SIGNIFICANT CHANGE UP (ref 6–8.3)
PROT UR-MCNC: NEGATIVE MG/DL — SIGNIFICANT CHANGE UP
PROTHROM AB SERPL-ACNC: 22.5 SEC — HIGH (ref 9.9–13.4)
RBC # BLD: 2.82 M/UL — LOW (ref 4.2–5.8)
RBC # FLD: 31.1 % — HIGH (ref 10.3–14.5)
RCV VOL RI: <2000 CELLS/UL — HIGH (ref 0–5)
SODIUM SERPL-SCNC: 139 MMOL/L — SIGNIFICANT CHANGE UP (ref 135–145)
SP GR SPEC: >1.03 — HIGH (ref 1–1.03)
SPECIMEN SOURCE: SIGNIFICANT CHANGE UP
TOTAL NUCLEATED CELL COUNT, BODY FLUID: 293 CELLS/UL — HIGH (ref 0–5)
TUBE TYPE: SIGNIFICANT CHANGE UP
UROBILINOGEN FLD QL: 0.2 MG/DL — SIGNIFICANT CHANGE UP (ref 0.2–1)
WBC # BLD: 4.85 K/UL — SIGNIFICANT CHANGE UP (ref 3.8–10.5)
WBC # FLD AUTO: 4.85 K/UL — SIGNIFICANT CHANGE UP (ref 3.8–10.5)

## 2024-10-01 PROCEDURE — 99222 1ST HOSP IP/OBS MODERATE 55: CPT

## 2024-10-01 PROCEDURE — 49083 ABD PARACENTESIS W/IMAGING: CPT

## 2024-10-01 PROCEDURE — 88112 CYTOPATH CELL ENHANCE TECH: CPT | Mod: 26

## 2024-10-01 PROCEDURE — 99233 SBSQ HOSP IP/OBS HIGH 50: CPT

## 2024-10-01 PROCEDURE — 73030 X-RAY EXAM OF SHOULDER: CPT | Mod: 26,RT

## 2024-10-01 PROCEDURE — 88305 TISSUE EXAM BY PATHOLOGIST: CPT | Mod: 26

## 2024-10-01 RX ADMIN — MUPIROCIN 1 APPLICATION(S): 20 OINTMENT TOPICAL at 18:15

## 2024-10-01 RX ADMIN — LACTULOSE 30 GRAM(S): 10 SOLUTION ORAL; RECTAL at 07:16

## 2024-10-01 RX ADMIN — MUPIROCIN 1 APPLICATION(S): 20 OINTMENT TOPICAL at 07:15

## 2024-10-01 RX ADMIN — QUETIAPINE FUMARATE 25 MILLIGRAM(S): 50 TABLET, FILM COATED ORAL at 23:08

## 2024-10-01 RX ADMIN — PANTOPRAZOLE SODIUM 40 MILLIGRAM(S): 40 TABLET, DELAYED RELEASE ORAL at 18:15

## 2024-10-01 RX ADMIN — CHLORHEXIDINE GLUCONATE ORAL RINSE 1 APPLICATION(S): 1.2 SOLUTION DENTAL at 07:17

## 2024-10-01 RX ADMIN — LABETALOL HYDROCHLORIDE 200 MILLIGRAM(S): 200 TABLET, FILM COATED ORAL at 23:08

## 2024-10-01 RX ADMIN — LABETALOL HYDROCHLORIDE 200 MILLIGRAM(S): 200 TABLET, FILM COATED ORAL at 07:16

## 2024-10-01 RX ADMIN — LACTULOSE 30 GRAM(S): 10 SOLUTION ORAL; RECTAL at 23:08

## 2024-10-01 RX ADMIN — PANTOPRAZOLE SODIUM 40 MILLIGRAM(S): 40 TABLET, DELAYED RELEASE ORAL at 07:15

## 2024-10-01 NOTE — PROCEDURE NOTE - NSTOLERANCE_GEN_A_CORE
Fatou Callahan(Resident)
Patient tolerated procedure well.

## 2024-10-01 NOTE — CHART NOTE - NSCHARTNOTEFT_GEN_A_CORE
Patient seen and examined  Had an extensive discussion with patient using [facility]  #[id number], also called patient's son and daughter to discuss in detail  He has right axillary sheath hematoma from A line, with nerve compression and resulting wrist drop, noted since 3-4 days ago  On exam he has complete wrist drop and diminished  strength, elbow and shoulder ROM intact grossly  Based on this he would require decompression of the axillary hematoma to relieve the nerve compression  This was offered to patient and discussed with his family  Risks involved with surgery would be high given decompensated liver failure, including but not limited to overall mortality, would healing complications, bleeding, infection, damage to surrounding structures  Benefit of surgery would be potential functional recovery of the wrist  Also discussed with primary team regarding risks of surgery given overall medical status  After extensive discussion, patient and family are all in agreement and chooses to not proceed with surgery due to the overall risks involved  They are able to verbalize understanding that he is unlikely to have any functional recovery without surgery  Will continue to monitor closely

## 2024-10-01 NOTE — PROCEDURE NOTE - NSPROCDETAILS_GEN_ALL_CORE
location identified, draped/prepped, sterile technique used, needle inserted/introduced/positive blood return obtained via catheter/connected to a pressurized flush line/sutured in place/hemostasis with direct pressure, dressing applied/Seldinger technique/all materials/supplies accounted for at end of procedure
patient pre-oxygenated, tube inserted, placement confirmed
guidewire recovered/lumen(s) aspirated and flushed/sterile dressing applied/sterile technique, catheter placed/ultrasound guidance with use of sterile gel and probe cove
location identified, sterile technique used, catheter introduced, fluid drained/Seldinger technique/sterile dressing applied

## 2024-10-01 NOTE — CHART NOTE - NSCHARTNOTEFT_GEN_A_CORE
Plan for EGD tomorrow morning  Please get phlebotomy to draw labs at 4am  NPO after midnight.      Griselda Hallman, PGY4  Gastroenterology and Hepatology  Available on TEAMS    For non urgent consult, please email [email or web address] (For [place]) or [email or web address] (For [facility])  Long range page number [phone]. Short range [phone]

## 2024-10-01 NOTE — PROGRESS NOTE ADULT - PROBLEM SELECTOR PLAN 5
-required 1 u pRBC 9/23  -Hgb dropped from   -1u pRBC on 9/26-> repeat CBC stable   - no plan for scope this admission, per hepatology -required 1 u pRBC 9/23  -Hgb dropped from   -1u pRBC on 9/26-> repeat CBC stable   - planned for EGD 10/1

## 2024-10-01 NOTE — CONSULT NOTE ADULT - SUBJECTIVE AND OBJECTIVE BOX
Vascular Surgery Consultation    History of Present Illness  Mr. Degroot is a 59 year old man with a history of alcohol use disorder admitted for decompensated cirrhosis and alcohol withdrawal. He was admitted to the medical intensive care unit where a right axillary arterial line was placed and removed 9/25 with subsequent development of a large axillary hematoma. At that time the patient was encephalopathic, however he states that over the past 3-4 days that he has had voluntary movement, he has noted a complete wrist drop. Orthopedic surgery evaluated the patient and noted that imaging 9/30 demonstrates compression of the axillary neurovascular bundle, prompting vascular surgical consultation.   ___________________________________________  Current Medications  Antimicrobial and Immunologic  rifAXIMin 550 milliGRAM(s) two times a day    Neuro, Psych and Analgesia  QUEtiapine 25 milliGRAM(s) daily  QUEtiapine 25 milliGRAM(s) at bedtime    Cardiovascular and Hematologic (includes DVT ppx/AC/Antiplatelet agents)  labetalol 200 milliGRAM(s) three times a day    GI, Endocrine and Nutrition  folic acid Injectable 1 milliGRAM(s) daily  glucagon  Injectable 1 milliGRAM(s) once  lactulose Syrup 30 Gram(s) every 8 hours  pantoprazole  Injectable 40 milliGRAM(s) two times a day  polyethylene glycol 3350 17 Gram(s) daily  thiamine 100 milliGRAM(s) daily  ___________________________________________  Vital Signs  T(C): 36.8 (10-01-24 @ 13:45), Max: 37.3 (10-01-24 @ 04:03)  HR: 69 (10-01-24 @ 13:45) (66 - 71)  BP: 142/84 (10-01-24 @ 13:45) (109/55 - 142/84)  RR: 18 (10-01-24 @ 13:45) (18 - 18)  SpO2: 98% (10-01-24 @ 13:45) (97% - 99%)    In/Out  10-01 @ 07:01  -  10-01 @ 19:45  --------------------------------------------------------  Urine output: 2.17 mL/kg/hr    Height/Weight  Height (cm): 182.9 (09-15)  Weight (kg): 83 (09-15)  BMI (kg/m2): 24.8 (09-15)  BSA (m2): 2.05 (09-15)  ____________________________________________  Physical Exam  General: Resting supine in bed in no acute distress.  Extremity: Extensive ecchymosis of the right upper extremity with no ulcerations or abrasions. Compartments soft throughout the upper extremity. Bilateral biceps and triceps strength is symmetric and intact. Right  strength 3/5, left  strength 5/5. Right wrist flexion 4/5, extension 0/5. Sensation intact throughout bilateral upper extremities in all distributions tested. Radial pulses palpable bilaterally.   ____________________________________________  Laboratory Studies  CBC Basic (10-01 @ 11:36)  WBC: 4.85 Hgb: 7.5 Hct: 22.3 Plt: 179    Metabolic Panel (10-01 @ 11:36)  139  |  112  |  11  ----------------------------<  90  3.7   |  15  |  1.25  Ca: 8.2/Phos: 3.0/Magnesium: 1.6    Liver Chemistries (10-01 @ 11:36)  Total protein 6.8 | Albumin 2.3  TBili 18.4 | DBili x  AST 77 | ALT 64 | AlkPhos 135    Coagulation Studies (10-01 @ 11:36)  PT/INR: 22.5/1.97, aPTT: 34.6    Urinalysis (10-01 @ 09:51)  Physical: Color Dark Yellow, Appearance Clear, Mucous x, Gross blood Negative  Analytic: SG >1.030, pH 6.0  Cells: RBC x, WBC x  UTI markers: Bacteria x, Leukocyte esterase Negative, Nitrites Negative  Chemical: Glucose x, Ketones Negative, Protein Negative, Urobilinogen 0.2, Bilirubin Large  ____________________________________________  Microbiology  Culture - Body Fluid with Gram Stain (collected 10-01 @ 11:52)  ____________________________________________  Imaging  CT Upper Extremity w/ IV Cont, Right (09-30 @ 18:57)  Heterogeneous nonsimple collection within the soft tissues of the right axilla, which extends into the proximal to mid triceps musculature and may extend into the proximalbiceps muscle, with mixed internal attenuation. Findings may be related to a hematoma within the soft tissues. Superimposed infection of this hematoma cannot be excluded. Compartment syndrome is a clinical diagnosis. Correlate clinically for signs of compartment syndrome. Additionally, recommend short-term interval follow-up CT or MRI of the right shoulder/humerus in 8 weeks to demonstrate resolution of this collection and to rule out an underlying mass/neoplasm. Collection envelops portions of the right axillary neurovascular bundle. It is unclear if this collection communicates with the glenohumeral joint space. If there is clinical concern for septic arthritis of the right glenohumeral joint space, right glenohumeraljoint aspiration should be performed. Nonspecific subcutaneous edema about the right upper extremity, which may be related to upper extremity edema and/or cellulitis. Partially imaged abdominal ascites. Please see separate same day abdomen/pelvis CT dictation for findings within the abdomen/pelvis.

## 2024-10-01 NOTE — PROGRESS NOTE ADULT - SUBJECTIVE AND OBJECTIVE BOX
DATE OF SERVICE: 10-01-24 @ 07:11    Patient is a 59y old  Male who presents with a chief complaint of Acute Liver Failure (30 Sep 2024 19:52)      INTERVAL/OVERNIGHT:    SUBJECTIVE:    MEDICATIONS  (STANDING):  chlorhexidine 4% Liquid 1 Application(s) Topical <User Schedule>  dextrose 5%. 1000 milliLiter(s) (100 mL/Hr) IV Continuous <Continuous>  dextrose 5%. 1000 milliLiter(s) (50 mL/Hr) IV Continuous <Continuous>  dextrose 50% Injectable 12.5 Gram(s) IV Push once  dextrose 50% Injectable 25 Gram(s) IV Push once  dextrose 50% Injectable 25 Gram(s) IV Push once  dextrose Oral Gel 15 Gram(s) Oral once  folic acid Injectable 1 milliGRAM(s) IV Push daily  glucagon  Injectable 1 milliGRAM(s) IntraMuscular once  labetalol 200 milliGRAM(s) Oral three times a day  lactulose Syrup 30 Gram(s) Oral every 8 hours  mupirocin 2% Nasal 1 Application(s) Both Nostrils two times a day  pantoprazole  Injectable 40 milliGRAM(s) IV Push two times a day  polyethylene glycol 3350 17 Gram(s) Oral daily  QUEtiapine 25 milliGRAM(s) Oral daily  QUEtiapine 25 milliGRAM(s) Oral at bedtime  rifAXIMin 550 milliGRAM(s) Oral two times a day  thiamine 100 milliGRAM(s) Oral daily    MEDICATIONS  (PRN):      Vital Signs Last 24 Hrs  T(C): 37.3 (01 Oct 2024 04:03), Max: 37.3 (01 Oct 2024 04:03)  T(F): 99.2 (01 Oct 2024 04:03), Max: 99.2 (01 Oct 2024 04:03)  HR: 70 (01 Oct 2024 04:03) (70 - 77)  BP: 109/55 (01 Oct 2024 04:03) (109/55 - 127/70)  BP(mean): --  RR: 18 (01 Oct 2024 04:03) (18 - 18)  SpO2: 98% (01 Oct 2024 04:03) (96% - 98%)    Parameters below as of 01 Oct 2024 04:03  Patient On (Oxygen Delivery Method): room air      CAPILLARY BLOOD GLUCOSE      POCT Blood Glucose.: 114 mg/dL (30 Sep 2024 22:15)  POCT Blood Glucose.: 88 mg/dL (30 Sep 2024 18:52)  POCT Blood Glucose.: 205 mg/dL (30 Sep 2024 12:16)  POCT Blood Glucose.: 91 mg/dL (30 Sep 2024 08:46)    I&O's Summary    30 Sep 2024 07:01  -  01 Oct 2024 07:00  --------------------------------------------------------  IN: 0 mL / OUT: 150 mL / NET: -150 mL        PHYSICAL EXAM:  GENERAL: NAD,  HEAD:  Atraumatic, Normocephalic  EYES: anicteric  NECK: Supple, No JVD  CHEST/LUNG: Clear to auscultation bilaterally; No wheeze  HEART: Regular rate and rhythm; No murmurs, rubs, or gallops  ABDOMEN: Soft, Nontender, Nondistended  EXTREMITIES: No b/l LE edema  NEUROLOGY: A&Ox3, non-focal  SKIN: No rashes or lesions    LABS:    09-30    142  |  114[H]  |  14  ----------------------------<  86  4.1   |  15[L]  |  1.15    Ca    8.8      30 Sep 2024 10:15  Phos  2.9     09-30  Mg     1.7     09-30    TPro  7.4  /  Alb  2.5[L]  /  TBili  20.5[H]  /  DBili  >10.0[H]  /  AST  92[H]  /  ALT  80[H]  /  AlkPhos  136[H]  09-30    PT/INR - ( 30 Sep 2024 10:15 )   PT: 19.6 sec;   INR: 1.73 ratio         PTT - ( 30 Sep 2024 10:15 )  PTT:31.2 sec      Urinalysis Basic - ( 30 Sep 2024 10:15 )    Color: x / Appearance: x / SG: x / pH: x  Gluc: 86 mg/dL / Ketone: x  / Bili: x / Urobili: x   Blood: x / Protein: x / Nitrite: x   Leuk Esterase: x / RBC: x / WBC x   Sq Epi: x / Non Sq Epi: x / Bacteria: x        RADIOLOGY & ADDITIONAL TESTS:    Imaging Personally Reviewed:    Consultant(s) Notes Reviewed:      Care Discussed with Consultants/Other Providers:   DATE OF SERVICE: 10-01-24 @ 07:11    Patient is a 59y old  Male who presents with a chief complaint of Acute Liver Failure (30 Sep 2024 19:52)      INTERVAL/OVERNIGHT: NAOE    SUBJECTIVE: Patient continues to endorse R wrist drop and abd distension. Planned for para and EGD today    MEDICATIONS  (STANDING):  chlorhexidine 4% Liquid 1 Application(s) Topical <User Schedule>  dextrose 5%. 1000 milliLiter(s) (100 mL/Hr) IV Continuous <Continuous>  dextrose 5%. 1000 milliLiter(s) (50 mL/Hr) IV Continuous <Continuous>  dextrose 50% Injectable 12.5 Gram(s) IV Push once  dextrose 50% Injectable 25 Gram(s) IV Push once  dextrose 50% Injectable 25 Gram(s) IV Push once  dextrose Oral Gel 15 Gram(s) Oral once  folic acid Injectable 1 milliGRAM(s) IV Push daily  glucagon  Injectable 1 milliGRAM(s) IntraMuscular once  labetalol 200 milliGRAM(s) Oral three times a day  lactulose Syrup 30 Gram(s) Oral every 8 hours  mupirocin 2% Nasal 1 Application(s) Both Nostrils two times a day  pantoprazole  Injectable 40 milliGRAM(s) IV Push two times a day  polyethylene glycol 3350 17 Gram(s) Oral daily  QUEtiapine 25 milliGRAM(s) Oral daily  QUEtiapine 25 milliGRAM(s) Oral at bedtime  rifAXIMin 550 milliGRAM(s) Oral two times a day  thiamine 100 milliGRAM(s) Oral daily    MEDICATIONS  (PRN):      Vital Signs Last 24 Hrs  T(C): 37.3 (01 Oct 2024 04:03), Max: 37.3 (01 Oct 2024 04:03)  T(F): 99.2 (01 Oct 2024 04:03), Max: 99.2 (01 Oct 2024 04:03)  HR: 70 (01 Oct 2024 04:03) (70 - 77)  BP: 109/55 (01 Oct 2024 04:03) (109/55 - 127/70)  BP(mean): --  RR: 18 (01 Oct 2024 04:03) (18 - 18)  SpO2: 98% (01 Oct 2024 04:03) (96% - 98%)    Parameters below as of 01 Oct 2024 04:03  Patient On (Oxygen Delivery Method): room air      CAPILLARY BLOOD GLUCOSE      POCT Blood Glucose.: 114 mg/dL (30 Sep 2024 22:15)  POCT Blood Glucose.: 88 mg/dL (30 Sep 2024 18:52)  POCT Blood Glucose.: 205 mg/dL (30 Sep 2024 12:16)  POCT Blood Glucose.: 91 mg/dL (30 Sep 2024 08:46)    I&O's Summary    30 Sep 2024 07:01  -  01 Oct 2024 07:00  --------------------------------------------------------  IN: 0 mL / OUT: 150 mL / NET: -150 mL        PHYSICAL EXAM:  GENERAL: NAD,  HEAD:  Atraumatic, Normocephalic  EYES: +scleral icterus  NECK: Supple, No JVD  CHEST/LUNG: Clear to auscultation bilaterally; No wheeze  HEART: Regular rate and rhythm; No murmurs, rubs, or gallops  ABDOMEN: Soft, distended, nontender  EXTREMITIES: No b/l LE edema. 1+ b/l UE radial pulses  NEUROLOGY: A&Ox2, +R wrist drop  SKIN: +jaundice    LABS:    09-30    142  |  114[H]  |  14  ----------------------------<  86  4.1   |  15[L]  |  1.15    Ca    8.8      30 Sep 2024 10:15  Phos  2.9     09-30  Mg     1.7     09-30    TPro  7.4  /  Alb  2.5[L]  /  TBili  20.5[H]  /  DBili  >10.0[H]  /  AST  92[H]  /  ALT  80[H]  /  AlkPhos  136[H]  09-30    PT/INR - ( 30 Sep 2024 10:15 )   PT: 19.6 sec;   INR: 1.73 ratio         PTT - ( 30 Sep 2024 10:15 )  PTT:31.2 sec      Urinalysis Basic - ( 30 Sep 2024 10:15 )    Color: x / Appearance: x / SG: x / pH: x  Gluc: 86 mg/dL / Ketone: x  / Bili: x / Urobili: x   Blood: x / Protein: x / Nitrite: x   Leuk Esterase: x / RBC: x / WBC x   Sq Epi: x / Non Sq Epi: x / Bacteria: x        RADIOLOGY & ADDITIONAL TESTS:  Heterogeneous nonsimple collection within the soft tissues of the right   axilla, which extends into the proximal to mid triceps musculature and   may extend into the proximalbiceps muscle, with mixed internal   attenuation. Findings may be related to a hematoma within the soft   tissues. Superimposed infection of this hematoma cannot be excluded.   Compartment syndrome is a clinical diagnosis. Correlate clinically for   signs of compartment syndrome. Additionally, recommend short-term   interval follow-up CT or MRI of the right shoulder/humerus in 8 weeks to   demonstrate resolution of this collection and to rule out an underlying   mass/neoplasm.    Collection envelops portions of the right axillary neurovascular bundle.   It is unclear if this collection communicates with the glenohumeral joint   space. If there is clinical concern for septic arthritis of the right   glenohumeral joint space, right glenohumeraljoint aspiration should be   performed.    Nonspecific subcutaneous edema about the right upper extremity, which may   be related to upper extremity edema and/or cellulitis.    Partially imaged abdominal ascites. Please see separate same day   abdomen/pelvis CT dictation for findings within the abdomen/pelvis.      Imaging Personally Reviewed:    Consultant(s) Notes Reviewed:      Care Discussed with Consultants/Other Providers:

## 2024-10-01 NOTE — PROGRESS NOTE ADULT - PROBLEM SELECTOR PLAN 1
Likely secondary to elevated ammonia level along with liver failure. CT head showing concerns for cerebral edema or global hypoxic injury. CTA and CTV- appear to have no major findings. MRI did not show any acute issues. vEEG- no major findings, repeat 9/23 no seizures. NGT removed. Ammonia improving on Lactulose      Plan:  - Continue Lactulose  - c/w miralax  - c/w Rifaximin  - c/w Seroquel 25 BID for agitation  - S/P PLEX- got 1st round 9/17 and 2nd 9/19, 3rd round 9/20  - Neurology on board

## 2024-10-01 NOTE — CONSULT NOTE ADULT - ASSESSMENT
ASSESSMENT & PLAN  59yMale w/ R upper arm hematoma s/p a line removal 6 days ago with 3-4 days of wrist drop  -WBAT  -Elevation of entire extremity  -pain control  -ice/cold compress      Will discuss case with oncall attending and update note accordingly    For all questions related to patient care, please reach out to the on-call team via the pager.     Cookie Paris, PGY 3  Orthopaedic Surgery  [facility] [id number]  [facility] [phone]  [facility] [phone]   ASSESSMENT & PLAN  59yMale w/ R upper arm hematoma s/p a line removal 6 days ago with 3-4 days of wrist drop  -WBAT  -Elevation of entire extremity  -pain control  -ice/cold compress  - Recommend Vascular consult since hematoma is 2/2 vascular injury/vascular bleeding      Will discuss case with oncall attending and update note accordingly    For all questions related to patient care, please reach out to the on-call team via the pager.     Cookie Paris, PGY 3  Orthopaedic Surgery  [facility] [phone]  [facility] [phone]  [facility] [phone]

## 2024-10-01 NOTE — CONSULT NOTE ADULT - ASSESSMENT
Mr. Degroot is a 59 year old man admitted for decompensated cirrhosis now with a complete right wrist drop following right axillary arterial line removal 9/25.    Recommendations  - STAT CTA right upper extremity.   - Unfortunately as this patient has had a neurologic deficit for several days now his likelihood of recovering function is low. Necessity of decompression vs additional intervention (e.g. endovascular) pending CTA.   - If surgery were to be indicated patient is at elevated risk due to decompensated cirrhosis. Please document medical risk stratification; per discussion with medical team today patient is at extremely elevated risk.   - Correct coagulopathy.     Case discussed with vascular surgery fellow on call Dr. Adhikari.     Hipolito Badillo, PGY-2  Vascular Surgery ([id number]) Mr. Degroot is a 59 year old man admitted for decompensated cirrhosis now with a complete right wrist drop following right axillary arterial line removal 9/25.    Recommendations  - would require decompression of axillary hematoma  - will discuss with family  - Correct coagulopathy.     Case discussed with vascular surgery fellow on call Dr. Adhikari.     Hipolito Badillo, PGY-2  Vascular Surgery ([id number])

## 2024-10-01 NOTE — PROGRESS NOTE ADULT - PROBLEM SELECTOR PLAN 7
RUE hematoma    -hold a/c  -c/w elevation and warm compresses Liver synthetic dysfunction and low AM cortisol levels  - FS q6h, hypoglycemia protocol

## 2024-10-01 NOTE — PROGRESS NOTE ADULT - PROBLEM SELECTOR PLAN 4
-Pt with episode of fever  -Zosyn ( 9/23-9/27)   -s/p Vanc  -blood cx 9/19 negative  - monitor off abx RUE hematoma vs IVF infiltration. Large heterogenous fluid collection noted on CT RUE w/ compression of neurovascular bundle  -hold a/c  -c/w elevation and warm compresses  - ortho consult, anticipate patient may be poor surgical candidate and may require IR evaluation as well RUE hematoma since 9/25 2/2 removal of R axillary a-line in MICU. Large heterogenous fluid collection noted on CT RUE w/ compression of neurovascular bundle  -hold a/c  -c/w elevation and warm compresses  - ortho consult, anticipate patient may be poor surgical candidate and may require IR evaluation as well

## 2024-10-01 NOTE — PROGRESS NOTE ADULT - PROBLEM SELECTOR PLAN 8
-c/w labetalol tid  - TTE 9/2023 - EF 65%  - if continued hypotension, would decrease labetalol -c/w labetalol 300 tid  - TTE 9/2023 - EF 65%  - if continued hypotension, would decrease labetalol

## 2024-10-01 NOTE — PROCEDURE NOTE - NSPOSTCAREGUIDE_GEN_A_CORE
Care for catheter as per unit/ICU protocols
Care for catheter as per unit/ICU protocols
Verbal/written post procedure instructions were given to patient/caregiver/Instructed patient/caregiver to follow-up with primary care physician/Instructed patient/caregiver regarding signs and symptoms of infection/Keep the cast/splint/dressing clean and dry

## 2024-10-01 NOTE — PROGRESS NOTE ADULT - PROBLEM SELECTOR PLAN 6
Liver synthetic dysfunction and low AM cortisol levels  - FS q6h, hypoglycemia protocol -Pt with episode of fever  -Zosyn ( 9/23-9/27)   -s/p Vanc  -blood cx 9/19 negative  - monitor off abx

## 2024-10-01 NOTE — CHART NOTE - NSCHARTNOTEFT_GEN_A_CORE
Medical Risk Assessment for Surgery    Stefan Degroot is a 59 year old male with medical history of alcohol use disorder, initially compensated cirrhosis who was initially hospitalized for acute liver failure, now resolved, but with progression of his liver disease to decompensated cirrhosis. Hospital course complicated by R axillary hematoma resulting in neurovascular compression and R wrist drop. Medical risk assessment requested for possible surgical intervention.    LABS:                        7.5    4.85  )-----------( 179      ( 01 Oct 2024 11:36 )             22.3     10-01    139  |  112[H]  |  11  ----------------------------<  90  3.7   |  15[L]  |  1.25    Ca    8.2[L]      01 Oct 2024 11:36  Phos  3.0     10-01  Mg     1.6     10-01    TPro  6.8  /  Alb  2.3[L]  /  TBili  18.4[H]  /  DBili  x   /  AST  77[H]  /  ALT  64[H]  /  AlkPhos  135[H]  10-01    PT/INR - ( 01 Oct 2024 11:36 )   PT: 22.5 sec;   INR: 1.97 ratio    PTT - ( 01 Oct 2024 11:36 )  PTT:34.6 sec    RADIOLOGY:  < from: CT Abdomen and Pelvis w/ IV Cont (09.30.24 @ 18:54) >  Moderate volume abdominal pelvic ascites  < end of copied text >        ASSESSMENT:  Proposed surgery: decompression vs. vascular intervention of RUE hematoma  Proposed surgical date: to be determined, possibly 10/2/24    MELD 3.0 score on 10/1/24: 30  Child Yoder score on 10/1/24: Class C, 13 points (3 points for bilirubin 18.4, 3 points for albumin 2.3, 2 points for INR 1.97, 3 points for moderate ascites, 2 points for grade 1 hepatic encephalopathy)    Given a Child Yoder Class C, the estimated abdominal surgery kenji-operative mortality is 82%. Caution should be used in extrapolating to non-abdominal surgeries as the Child Yoder score was studied primarily in abdominal surgeries, and risk may be under- or overestimated.  A MELD 3.0 score of 30 is associated with elevated surgical risk. Additionally, a MELD score of 30 is associated with an estimated 90-day of survival of 72.7% in general.    This patient is at an elevated risk for cirrhosis related complication and mortality. Recommend shared decision making of risks, benefits, and alternatives with patient and family by surgical team.    Reference: [facility] Clinical Practice Update on Surgical Risk Assessment and Perioperative Management in Cirrhosis: Expert Review, PMCID: [id number]

## 2024-10-01 NOTE — PROGRESS NOTE ADULT - PROBLEM SELECTOR PLAN 2
Pt with long history of etoh abuse with evidence of hepatic steatosis on imaging.  Appears to be related to a prior ischemic episode. Pt now with increased distension and hypotension.     Plan:  - New onset Small to mod ascites on US; never been tapped before  - IR consult for diagnostic tap, to be done after CT AP  - Continue Thiamine and Folate  - s/p NAC, currently off   - S/P PLEX as above   - Hepatology following  - Continue Lactulose and Miralax  -Continue Rifaximin Pt with long history of etoh abuse with evidence of hepatic steatosis on imaging.  Appears to be related to a prior ischemic episode. Pt now with increased distension and hypotension.     Plan:  - New onset Small to mod ascites on US; never been tapped before  - s/p para 10/1/24 w/ SAAG 1.2 consistent with portal hypertension  - 143 PMNs, not consistent w/ SBP  - Continue Thiamine and Folate  - s/p NAC, currently off   - S/P PLEX as above   - Hepatology following  - Continue Lactulose and Miralax  -Continue Rifaximin

## 2024-10-01 NOTE — PROGRESS NOTE ADULT - ATTENDING COMMENTS
58 y/o male, PMH ETOH abuse, gastritis, PUD, hx of hypoxic respiratory failure requiring intubation due to aspiration PNA (most recent intubation Aril 2020), presented to [facility] on 9/13 c/o etoh w/d, abd pain, N/V. LFTs began increasing, started on NAC gtt. Pt was transferred to [facility] on 9/15 for evaluation of acute liver failure by Hepatology.     Initially patient was admitted to MICU, and placed on NAC for liver injury. He is unlikely a candidate for LT due to multiple admissions in past for alcohol withdrawal. Pt was intubated on 9/17, extubated 9/23. Pt received PLEX x3, mannitol, hypertonic saline for hyperammonemia and cerebral edema. Pt also given lactulose, ammonia down to 42. Mental status improved, transferred to floor.     He developed a hematoma in RUE, possibly from infiltrated IV. He is now having neurologic compromise with R wrist drop. CT showed involvement of the neurovascular bundle around the axilla. To prevent permanent nerve injury, will consult orthopedic surgery for evaluation of decompressive surgery. If he is not a surgical candidate, will consult IR for an evaluation.     Patient's abdomen is more distended. Currently on Lactulose and Miralax. CT showed ileus vs. partial SBO. He is still having bowel movements. Can continue diet as tolerated for now, monitoring bowel function.     S/p diagnostic paracentesis 10/1 without evidence of SBP. Given worsening MELD, may benefit from steroids if infection is ruled out. F/u infectious workup. Hepatology recs appreciated. Planned for EGD per hepatology.     Discussed with team. Rest as above.     The necessity of the time spent during the encounter on this date of service was due to:   - Ordering, reviewing, and interpreting labs, testing, and imaging  - Independently obtaining a review of systems and performing a physical exam  - Reviewing prior hospitalization and where necessary, outpatient records  - Reviewing consultant recommendations/communicating with consultants  - Counselling and educating patient and family regarding interpretation of aforementioned items and plan of care    Time-based billing (NON-critical care). Total minutes spent: 60 60 y/o male, PMH ETOH abuse, gastritis, PUD, hx of hypoxic respiratory failure requiring intubation due to aspiration PNA (most recent intubation Aril 2020), presented to [facility] on 9/13 c/o etoh w/d, abd pain, N/V. LFTs began increasing, started on NAC gtt. Pt was transferred to [facility] on 9/15 for evaluation of acute liver failure by Hepatology.     Initially patient was admitted to MICU, and placed on NAC for liver injury. He is unlikely a candidate for LT due to multiple admissions in past for alcohol withdrawal. Pt was intubated on 9/17, extubated 9/23. Pt received PLEX x3, mannitol, hypertonic saline for hyperammonemia and cerebral edema. Pt also given lactulose, ammonia down to 42. Mental status improved, transferred to floor.     He developed a hematoma in RUE, possibly a complication from an axillary line. He is now having neurologic compromise with R wrist drop. CT showed involvement of the neurovascular bundle around the axilla. To prevent permanent nerve injury, will consult orthopedic surgery for evaluation of decompressive surgery. If he is not a surgical candidate, will consult IR for an evaluation.     Patient's abdomen is more distended. Currently on Lactulose and Miralax. CT showed ileus vs. partial SBO. He is still having bowel movements. Can continue diet as tolerated for now, monitoring bowel function.     S/p diagnostic paracentesis 10/1 without evidence of SBP. Given worsening MELD, may benefit from steroids if infection is ruled out. F/u infectious workup. Hepatology recs appreciated. Planned for EGD per hepatology.     Discussed with team. Rest as above.     The necessity of the time spent during the encounter on this date of service was due to:   - Ordering, reviewing, and interpreting labs, testing, and imaging  - Independently obtaining a review of systems and performing a physical exam  - Reviewing prior hospitalization and where necessary, outpatient records  - Reviewing consultant recommendations/communicating with consultants  - Counselling and educating patient and family regarding interpretation of aforementioned items and plan of care    Time-based billing (NON-critical care). Total minutes spent: 60 L shoulder flexion 0-90 degrees, pt unable to fully extend L elbow (approx 15 less than full extension)

## 2024-10-01 NOTE — PROCEDURE NOTE - NSINDICATIONS_GEN_A_CORE
ascites
arterial puncture to obtain ABG's/blood sampling/critical patient/monitoring purposes
airway protection
dialysis/CRRT

## 2024-10-01 NOTE — CONSULT NOTE ADULT - SUBJECTIVE AND OBJECTIVE BOX
HPI  59yMale w/ PMH of EtOH and chronic decompensated liver failure s/p A-line removal with subsequent hematoma 9/25, with development of wrist drop 3-4 days ago. Patient endorses moderate pain in upper arm when touched, but is comfortable when resting. Patient endorses generalized sensation decreased in arm.     ROS  Negative unless otherwise specified in HPI.    PAST MEDICAL & SURGICAL Hx  PAST MEDICAL & SURGICAL HISTORY:  PUD (peptic ulcer disease)      EtOH dependence      Gastritis      Elevated lipase      No significant past surgical history          MEDICATIONS  Home Medications:  folic acid 1 mg oral tablet: 1 tab(s) orally once a day (14 Sep 2024 13:01)      ALLERGIES  No Known Allergies      FAMILY Hx  FAMILY HISTORY:  FH: HTN (hypertension)        SOCIAL Hx  Social History:      VITALS  Vital Signs Last 24 Hrs  T(C): 36.1 (01 Oct 2024 13:30), Max: 37.3 (01 Oct 2024 04:03)  T(F): 97 (01 Oct 2024 13:30), Max: 99.2 (01 Oct 2024 04:03)  HR: 66 (01 Oct 2024 13:30) (66 - 71)  BP: 116/72 (01 Oct 2024 13:30) (109/55 - 127/70)  BP(mean): --  RR: 18 (01 Oct 2024 13:30) (18 - 18)  SpO2: 97% (01 Oct 2024 13:30) (97% - 99%)    Parameters below as of 01 Oct 2024 13:30  Patient On (Oxygen Delivery Method): room air        PHYSICAL EXAM  Gen: Lying in bed, NAD  Resp: No increased WOB  RUE:  Skin intact with ecchymosis on medial aspect of arm and generalized swelling of entire extremity  Medial aspect of arm with palpable hematoma, compartments full but compressible  Motor:   Biceps 4/5  Triceps 3/5  Wrist ext 0/5  Wrist flex 4/5  Digit ext 2/5  Digist flex 4/5  Sensory: Ax/Musc/Med/Rad/U generalized decreased compared to contralateral side  +Rad pulse, WWP    Secondary survey:  No TTP along spine or other extremities, SILT and soft compartments throughout    LABS                        7.5    4.85  )-----------( 179      ( 01 Oct 2024 11:36 )             22.3     10-01    139  |  112[H]  |  11  ----------------------------<  90  3.7   |  15[L]  |  1.25    Ca    8.2[L]      01 Oct 2024 11:36  Phos  3.0     10-01  Mg     1.6     10-01    TPro  6.8  /  Alb  2.3[L]  /  TBili  18.4[H]  /  DBili  x   /  AST  77[H]  /  ALT  64[H]  /  AlkPhos  135[H]  10-01    PT/INR - ( 01 Oct 2024 11:36 )   PT: 22.5 sec;   INR: 1.97 ratio         PTT - ( 01 Oct 2024 11:36 )  PTT:34.6 sec    Imaging:  CT RUE w IV contrast  IMPRESSION:    Heterogeneous nonsimple collection within the soft tissues of the right axilla, which extends into the proximal to mid triceps musculature and may extend into the proximal biceps muscle, with mixed internal attenuation. Findings may be related to a hematoma within the soft tissues. Superimposed infection of this hematoma cannot be excluded. Compartment syndrome is a clinical diagnosis. Correlate clinically for signs of compartment syndrome. Additionally, recommend short-term interval follow-up CT or MRI of the right shoulder/humerus in 8 weeks to demonstrate resolution of this collection and to rule out an underlying mass/neoplasm.    Collection envelops portions of the right axillary neurovascular bundle. It is unclear if this collection communicates with the glenohumeral joint space. If there is clinical concern for septic arthritis of the right glenohumeral joint space, right glenohumeral joint aspiration should be performed.    Nonspecific subcutaneous edema about the right upper extremity, which may be related to upper extremity edema and/or cellulitis.    Partially imaged abdominal ascites. Please see separate same day abdomen/pelvis CT dictation for findings within the abdomen/pelvis.

## 2024-10-01 NOTE — PROGRESS NOTE ADULT - ASSESSMENT
Patient is a 59 year old M with PMHx ETOH abuse with frequent admissions for withdrawal & gastritis admitted to ICU for acute on chronic decompensated liver failure & alcohol withdrawal. S/p intubation for airway protection (9/17-9/23). s/p PLEX for Acute liver failure, and pt is poor candidate for liver transplant. Pt continues to have AMS despite good BM's from lactulose. Abdomen now distended, SBO/ileus vs. ascites (small to mod ascites on US). 59 year old M with PMHx ETOH abuse with frequent admissions for withdrawal & gastritis initially admitted to ICU for acute on chronic decompensated liver failure & alcohol withdrawal. S/p intubation for airway protection (9/17-9/23). s/p PLEX for hepatic encephalopathy. Course c/b abdominal distension 2/2 ascites vs. distended loops of bowel (patient not clinically obstructed). Course also c/b RUE IV infiltration with subsequent hematoma vs. fluid collection w/ neurovascular compression and resulting R wrist drop.

## 2024-10-02 DIAGNOSIS — R14.0 ABDOMINAL DISTENSION (GASEOUS): ICD-10-CM

## 2024-10-02 DIAGNOSIS — K74.60 UNSPECIFIED CIRRHOSIS OF LIVER: ICD-10-CM

## 2024-10-02 DIAGNOSIS — K76.82 HEPATIC ENCEPHALOPATHY: ICD-10-CM

## 2024-10-02 DIAGNOSIS — R18.8 OTHER ASCITES: ICD-10-CM

## 2024-10-02 LAB
ANION GAP SERPL CALC-SCNC: 10 MMOL/L — SIGNIFICANT CHANGE UP (ref 5–17)
BUN SERPL-MCNC: 9 MG/DL — SIGNIFICANT CHANGE UP (ref 7–23)
CALCIUM SERPL-MCNC: 8.1 MG/DL — LOW (ref 8.4–10.5)
CHLORIDE SERPL-SCNC: 110 MMOL/L — HIGH (ref 96–108)
CO2 SERPL-SCNC: 16 MMOL/L — LOW (ref 22–31)
CREAT SERPL-MCNC: 1.08 MG/DL — SIGNIFICANT CHANGE UP (ref 0.5–1.3)
CULTURE RESULTS: SIGNIFICANT CHANGE UP
EGFR: 80 ML/MIN/1.73M2 — SIGNIFICANT CHANGE UP
GLUCOSE BLDC GLUCOMTR-MCNC: 115 MG/DL — HIGH (ref 70–99)
GLUCOSE BLDC GLUCOMTR-MCNC: 73 MG/DL — SIGNIFICANT CHANGE UP (ref 70–99)
GLUCOSE SERPL-MCNC: 71 MG/DL — SIGNIFICANT CHANGE UP (ref 70–99)
HCT VFR BLD CALC: 22.5 % — LOW (ref 39–50)
HGB BLD-MCNC: 7.3 G/DL — LOW (ref 13–17)
INR BLD: 2.05 RATIO — HIGH (ref 0.85–1.16)
MAGNESIUM SERPL-MCNC: 1.6 MG/DL — SIGNIFICANT CHANGE UP (ref 1.6–2.6)
MCHC RBC-ENTMCNC: 26.7 PG — LOW (ref 27–34)
MCHC RBC-ENTMCNC: 32.4 GM/DL — SIGNIFICANT CHANGE UP (ref 32–36)
MCV RBC AUTO: 82.4 FL — SIGNIFICANT CHANGE UP (ref 80–100)
NRBC # BLD: 0 /100 WBCS — SIGNIFICANT CHANGE UP (ref 0–0)
PHOSPHATE SERPL-MCNC: 2.8 MG/DL — SIGNIFICANT CHANGE UP (ref 2.5–4.5)
PLATELET # BLD AUTO: 212 K/UL — SIGNIFICANT CHANGE UP (ref 150–400)
POTASSIUM SERPL-MCNC: 3.6 MMOL/L — SIGNIFICANT CHANGE UP (ref 3.5–5.3)
POTASSIUM SERPL-SCNC: 3.6 MMOL/L — SIGNIFICANT CHANGE UP (ref 3.5–5.3)
PROTHROM AB SERPL-ACNC: 23.2 SEC — HIGH (ref 9.9–13.4)
RBC # BLD: 2.73 M/UL — LOW (ref 4.2–5.8)
RBC # FLD: 31.5 % — HIGH (ref 10.3–14.5)
SODIUM SERPL-SCNC: 136 MMOL/L — SIGNIFICANT CHANGE UP (ref 135–145)
SPECIMEN SOURCE: SIGNIFICANT CHANGE UP
WBC # BLD: 4.24 K/UL — SIGNIFICANT CHANGE UP (ref 3.8–10.5)
WBC # FLD AUTO: 4.24 K/UL — SIGNIFICANT CHANGE UP (ref 3.8–10.5)

## 2024-10-02 PROCEDURE — 99233 SBSQ HOSP IP/OBS HIGH 50: CPT

## 2024-10-02 RX ORDER — FOLIC ACID 1 MG/1
1 TABLET ORAL DAILY
Refills: 0 | Status: DISCONTINUED | OUTPATIENT
Start: 2024-10-02 | End: 2024-10-11

## 2024-10-02 RX ORDER — PANTOPRAZOLE SODIUM 40 MG/1
40 TABLET, DELAYED RELEASE ORAL
Refills: 0 | Status: DISCONTINUED | OUTPATIENT
Start: 2024-10-02 | End: 2024-10-11

## 2024-10-02 RX ADMIN — PANTOPRAZOLE SODIUM 40 MILLIGRAM(S): 40 TABLET, DELAYED RELEASE ORAL at 06:25

## 2024-10-02 RX ADMIN — Medication 17 GRAM(S): at 12:42

## 2024-10-02 RX ADMIN — LABETALOL HYDROCHLORIDE 200 MILLIGRAM(S): 200 TABLET, FILM COATED ORAL at 22:08

## 2024-10-02 RX ADMIN — LABETALOL HYDROCHLORIDE 200 MILLIGRAM(S): 200 TABLET, FILM COATED ORAL at 14:45

## 2024-10-02 RX ADMIN — LACTULOSE 30 GRAM(S): 10 SOLUTION ORAL; RECTAL at 06:27

## 2024-10-02 RX ADMIN — FOLIC ACID 1 MILLIGRAM(S): 1 TABLET ORAL at 12:41

## 2024-10-02 RX ADMIN — THIAMINE HYDROCHLORIDE 100 MILLIGRAM(S): 100 INJECTION, SOLUTION INTRAMUSCULAR; INTRAVENOUS at 12:41

## 2024-10-02 RX ADMIN — MUPIROCIN 1 APPLICATION(S): 20 OINTMENT TOPICAL at 17:27

## 2024-10-02 RX ADMIN — LACTULOSE 30 GRAM(S): 10 SOLUTION ORAL; RECTAL at 14:44

## 2024-10-02 RX ADMIN — QUETIAPINE FUMARATE 25 MILLIGRAM(S): 50 TABLET, FILM COATED ORAL at 22:08

## 2024-10-02 RX ADMIN — CHLORHEXIDINE GLUCONATE ORAL RINSE 1 APPLICATION(S): 1.2 SOLUTION DENTAL at 06:26

## 2024-10-02 RX ADMIN — LACTULOSE 30 GRAM(S): 10 SOLUTION ORAL; RECTAL at 22:08

## 2024-10-02 RX ADMIN — MUPIROCIN 1 APPLICATION(S): 20 OINTMENT TOPICAL at 06:26

## 2024-10-02 RX ADMIN — QUETIAPINE FUMARATE 25 MILLIGRAM(S): 50 TABLET, FILM COATED ORAL at 12:41

## 2024-10-02 RX ADMIN — LABETALOL HYDROCHLORIDE 200 MILLIGRAM(S): 200 TABLET, FILM COATED ORAL at 06:25

## 2024-10-02 NOTE — PROGRESS NOTE ADULT - PROBLEM SELECTOR PLAN 8
-Pt with episode of fever  -Zosyn ( 9/23-9/27)   -s/p Vanc  -blood cx 9/19 negative  - monitor off abx

## 2024-10-02 NOTE — PROGRESS NOTE ADULT - ATTENDING COMMENTS
58 y/o male, PMH ETOH abuse, gastritis, PUD, hx of hypoxic respiratory failure requiring intubation due to aspiration PNA (most recent intubation Aril 2020), presented to [facility] on 9/13 c/o etoh w/d, abd pain, N/V. LFTs began increasing, started on NAC gtt. Pt was transferred to [facility] on 9/15 for evaluation of acute liver failure by Hepatology.     Initially patient was admitted to MICU, and placed on NAC for liver injury. He is unlikely a candidate for LT due to multiple admissions in past for alcohol withdrawal. Pt was intubated on 9/17, extubated 9/23. Pt received PLEX x3, mannitol, hypertonic saline for hyperammonemia and cerebral edema. Pt also given lactulose, ammonia down to 42. Mental status improved, transferred to floor.     He developed a hematoma in RUE, possibly a complication from an axillary line. He is having neurologic compromise with R wrist drop, which is likely due to a radial nerve injury. CT showed involvement of the neurovascular bundle around the axilla. Ortho was consulted initially but deferred to vascular surgery. Vascular surgery offered evacuation of hematoma, however, the family declined. OT consulted as he will need splinting of the R wrist.     Patient's abdomen is more distended. Currently on Lactulose and Miralax. CT showed ileus vs. partial SBO. He is still having bowel movements. Can continue diet as tolerated for now, monitoring bowel function.     S/p diagnostic paracentesis 10/1 without evidence of SBP. Given worsening MELD, may benefit from steroids if infection is ruled out. F/u infectious workup. Hepatology recs appreciated. Initially planned for EGD per hepatology, now deferred.    Discussed with team. Rest as above. Dispo planning to [facility].     The necessity of the time spent during the encounter on this date of service was due to:   - Ordering, reviewing, and interpreting labs, testing, and imaging  - Independently obtaining a review of systems and performing a physical exam  - Reviewing prior hospitalization and where necessary, outpatient records  - Reviewing consultant recommendations/communicating with consultants  - Counselling and educating patient and family regarding interpretation of aforementioned items and plan of care    Time-based billing (NON-critical care). Total minutes spent: 60 60 y/o male, PMH ETOH abuse, gastritis, PUD, hx of hypoxic respiratory failure requiring intubation due to aspiration PNA (most recent intubation Aril 2020), presented to [facility] on 9/13 c/o etoh w/d, abd pain, N/V. LFTs began increasing, started on NAC gtt. Pt was transferred to [facility] on 9/15 for evaluation of acute liver failure by Hepatology.     Initially patient was admitted to MICU, and placed on NAC for liver injury. He is unlikely a candidate for LT due to multiple admissions in past for alcohol withdrawal. Pt was intubated on 9/17, extubated 9/23. Pt received PLEX x3, mannitol, hypertonic saline for hyperammonemia and cerebral edema. Pt also given lactulose, ammonia down to 42. Mental status improved, transferred to floor.     He developed a hematoma in RUE, possibly a complication from an axillary line. He is having neurologic compromise with R wrist drop, which is likely due to a radial nerve injury. CT showed involvement of the neurovascular bundle around the axilla. Ortho was consulted initially but deferred to vascular surgery. Vascular surgery offered evacuation of hematoma, however, the family declined. OT consulted as he will need splinting of the R wrist.     Patient's abdomen is more distended. Currently on Lactulose and Miralax. CT showed ileus vs. partial SBO. He is still having bowel movements. Can continue diet as tolerated for now, monitoring bowel function.     S/p diagnostic paracentesis 10/1 without evidence of SBP. Given worsening MELD, may benefit from steroids if infection is ruled out. F/u infectious workup. Hepatology recs appreciated. Initially planned for EGD per hepatology, now deferred.    Discussed with team. Rest as above. Dispo pending improvement in mental status.     The necessity of the time spent during the encounter on this date of service was due to:   - Ordering, reviewing, and interpreting labs, testing, and imaging  - Independently obtaining a review of systems and performing a physical exam  - Reviewing prior hospitalization and where necessary, outpatient records  - Reviewing consultant recommendations/communicating with consultants  - Counselling and educating patient and family regarding interpretation of aforementioned items and plan of care    Time-based billing (NON-critical care). Total minutes spent: 60

## 2024-10-02 NOTE — CONSULT NOTE ADULT - SUBJECTIVE AND OBJECTIVE BOX
Interventional Radiology    Evaluate for Procedure: Right Arm Hematoma Drainage     HPI: 59 year old M with PMHx ETOH abuse with frequent admissions for withdrawal & gastritis initially admitted to ICU for acute on chronic decompensated liver failure & alcohol withdrawal. S/p intubation for airway protection (9/17-9/23). s/p PLEX for hepatic encephalopathy. Course c/b abdominal distension 2/2 ascites vs. distended loops of bowel (patient not clinically obstructed). Course c/b R wrist drop 2/2 R axillary hematoma compressing neurovascular bundle. Vascular follow for potential right arm hematoma evacuation.     Allergies: No Known Allergies    Medications (Abx/Cardiac/Anticoagulation/Blood Products)  labetalol: 200 milliGRAM(s) Oral (10-02 @ 14:45)  rifAXIMin: 550 milliGRAM(s) Oral (10-02 @ 06:25)    Data:  182.9  83  T(C): 36.7  HR: 71  BP: 117/69  RR: 18  SpO2: 97%    -WBC 4.24 / HgB 7.3 / Hct 22.5 / Plt 212  -Na 136 / Cl 110 / BUN 9 / Glucose 71  -K 3.6 / CO2 16 / Cr 1.08  -ALT -- / Alk Phos -- / T.Bili --  -INR 2.05 / PTT --    Radiology:   < from: CT Upper Extremity w/ IV Cont, Right (09.30.24 @ 18:57) >    INTERPRETATION:  INDICATION: Right wrist drop, evaluate for compartment   syndrome    TECHNIQUE: CT of the right upper extremity with intravenous contrast with   axial, sagittal, and coronal multiplanar reformats. Approximately 90 cc   intravenous Omnipaque 350 was administered.    Comparison:Ultrasound dated 9/24/2024    FINDINGS:    Radiopaque bracelet overlying the wrist limits evaluation this region due   to streak artifact.    Heterogeneous nonsimple collection within the soft tissues of the right   axilla, which extends into the proximal to mid triceps musculature and   may extend into the proximal biceps muscle, with mixed internal   attenuation, measuring approximately 5.9 x 3.4 cm transaxially and up to   15.6 cm proximal distal. Collection envelops portions of the right   axillary neurovascular bundle. It is unclear if this collection   communicates with the glenohumeral joint space.    Nonspecific subcutaneous edema throughout the right upper extremity,   which may be related to right upper extremity edema and/or cellulitis. No   soft tissue gas seen.    Probable surgical clips seen within the medial soft tissues along the mid   humeral diaphysis.    Partially evaluated abdominal ascites. Please see separate same day   abdomen/pelvis CT dictation for findings within the abdomen/pelvis.    Evaluation for fracture is limited due to large field-of-view images. No  definite acute displaced fracture or dislocation seen.    IMPRESSION:    Heterogeneous nonsimple collection within the soft tissues of the right   axilla, which extends into the proximal to mid triceps musculature and   may extend into the proximalbiceps muscle, with mixed internal   attenuation. Findings may be related to a hematoma within the soft   tissues. Superimposed infection of this hematoma cannot be excluded.   Compartment syndrome is a clinical diagnosis. Correlate clinically for   signs of compartment syndrome. Additionally, recommend short-term   interval follow-up CT or MRI of the right shoulder/humerus in 8 weeks to   demonstrate resolution of this collection and to rule out an underlying   mass/neoplasm.    Collection envelops portions of the right axillary neurovascular bundle.   It is unclear if this collection communicates with the glenohumeral joint   space. If there is clinical concern for septic arthritis of the right   glenohumeral joint space, right glenohumeraljoint aspiration should be   performed.    Nonspecific subcutaneous edema about the right upper extremity, which may   be related to upper extremity edema and/or cellulitis.    Partially imaged abdominal ascites. Please see separate same day   abdomen/pelvis CT dictation for findings within the abdomen/pelvis.    < end of copied text >      Assessment/Plan:   59 year old M with PMHx ETOH abuse with frequent admissions for withdrawal & gastritis initially admitted to ICU for acute on chronic decompensated liver failure & alcohol withdrawal. S/p intubation for airway protection (9/17-9/23). s/p PLEX for hepatic encephalopathy. Course c/b abdominal distension 2/2 ascites vs. distended loops of bowel (patient not clinically obstructed). Course c/b R wrist drop 2/2 R axillary hematoma compressing neurovascular bundle. Vascular follow for potential right arm hematoma evacuation. IR now consulted for Right arm hematoma Drainage.       - no role for percutaneous drainage of hematoma  - likely will not drain appropriately  - recommend vascular for hematoma evacuation   - above plan discussed with Dr. Rodríguez  - d/w primary team

## 2024-10-02 NOTE — PROGRESS NOTE ADULT - PROBLEM SELECTOR PLAN 1
Had significantly elevated ammonia to 150s in MICU s/p PLEX and c/f cerebral edema. Now A&Ox1-2. Ammonia controlled    - stool counts, goal 2-3 BM  - c/w lactulose 30 q8h  - c/w miralax  - c/w Rifaximin  - c/w Seroquel 25 BID for agitation

## 2024-10-02 NOTE — PROGRESS NOTE ADULT - PROBLEM SELECTOR PLAN 2
Multifactorial - distended loops of bowel and ascites  - patient is not clinically obstructed as he is having BMs, would defer NGT for decompression  - management of ascites as below

## 2024-10-02 NOTE — PROGRESS NOTE ADULT - PROBLEM SELECTOR PLAN 6
-required 1 u pRBC 9/23  -Hgb dropped from   -1u pRBC on 9/26-> repeat CBC stable   - planned for EGD 10/1 -required 1 u pRBC 9/23  -Hgb dropped from   -1u pRBC on 9/26-> repeat CBC stable   - per hepatology, patient has no clinical bleeding, so EGD is not necessary

## 2024-10-02 NOTE — PROGRESS NOTE ADULT - PROBLEM SELECTOR PLAN 5
2/2 Alc use disorder and MASLD, as well as prior ischemic episode. Progressed from compensated cirrhosis to decompensated this admission. Decompensated due to ascites and hepatic encephalopathy  - s/p NAC in MICU  - appreciate hepatology recs  - management of ascites and HE as above

## 2024-10-02 NOTE — CONSULT NOTE ADULT - ASSESSMENT
59 years old male with h/o chronic ETOH abuse and dependence (1 bottle of Vodka a day) with long standing hx of alcohol withdrawal and DTs with frequent hospital/ICU admissions, alcoholic gastritis/esophagitis, PUD, HLD, hx of hypoxic respiratory failure requiring intubation due to aspiration PNA (most recent intubation Aril 2020),  staph PNA, COVID-19 infection Dec 2020 presented with abd pain at the OSH.    #Acute liver failure  #Alcohol-related liver disease  #Hepatic steatosis  Patient with multiple hospitalizations in the past for alcohol withdrawal and DT per records. Unclear last alcohol drink. he was admitted with AMS and intubated with findings of cerebral edema and concern for GUNJAN. Now extubated on 9/23 with improvement in mental status s/p Plex x3, mannitol and hypertonic saline.    High MDF score of 49  - Calculated MELD 3.0 score 34 >>29  - Ascites: s/p paracentesis 10/1 with 800cc fluid removal, SAAG > 1.1, Total protein 2.5, negative for SBP  - Varices:  - HE: see above, now improved  - HCC:     Recommendations:   - Inpatient OLT eval given persistently elevated MELD   - Complete infectious work-up including cultures  - Will consider glucocorticoid therapy if infectious w/u negative  - Rifaximin 550 mg BID, lactulose to goal 3-4 BMs per day.   - Trend CBC, CMP & PT/INR daily    Mayur Castillo MD  Gastroenterology/Hepatology Fellow  Available via [employer] Teams  Pager: [phone]    On Weekdays after 5 PM to 8AM and Weekends/Holidays (All day)  For non-urgent consults, please email [email or web address] or [email or web address]  For urgent consults, please contact on call GI team.  See Amion schedule ([facility]), [employer] paging system - [phone] ([facility]), or call hospital  ([facility]) 59 years old male with h/o chronic ETOH abuse and dependence (1 bottle of Vodka a day) with long standing hx of alcohol withdrawal and DTs with frequent hospital/ICU admissions, alcoholic gastritis/esophagitis, PUD, HLD, hx of hypoxic respiratory failure requiring intubation due to aspiration PNA (most recent intubation Aril 2020),  staph PNA, COVID-19 infection Dec 2020 presented with abd pain at the OSH.    #Acute liver failure  #Alcohol-related liver disease  #Hepatic steatosis  Patient with multiple hospitalizations in the past for alcohol withdrawal and DT per records. Unclear last alcohol drink. he was admitted with AMS and intubated with findings of cerebral edema and concern for GUNJAN. Now extubated on 9/23 with improvement in mental status s/p Plex x3, mannitol and hypertonic saline.    High MDF score of 49  - Calculated MELD 3.0 score 34 >>29  - Ascites: s/p paracentesis 10/1 with 800cc fluid removal, SAAG > 1.1, Total protein 2.5, negative for SBP  - Varices: no EGD on file  - HE: see above, now improved  - HCC: CT without lesion (not triple phase study)    Recommendations:   - To discuss inpatient OLT eval given persistently elevated MELD   - Complete infectious work-up including cultures  - Will consider glucocorticoid therapy if infectious w/u negative  - Rifaximin 550 mg BID, lactulose to goal 3-4 BMs per day.   - Trend CBC, CMP & PT/INR daily  - Low sodium diet  - Thiamine, FA, MV supplementation  - Alcohol cessation counseling  - Remainder per primary team    Mayur Castillo MD  Gastroenterology/Hepatology Fellow  Available via [employer] Teams  Pager: [phone]    On Weekdays after 5 PM to 8AM and Weekends/Holidays (All day)  For non-urgent consults, please email [email or web address] or [email or web address]  For urgent consults, please contact on call GI team.  See Amion schedule ([facility]), [employer] system - [phone] ([facility]), or call hospital  ([facility]) 59 years old male with h/o chronic ETOH abuse and dependence (1 bottle of Vodka a day) with long standing hx of alcohol withdrawal and DTs with frequent hospital/ICU admissions, alcoholic gastritis/esophagitis, PUD, HLD, hx of hypoxic respiratory failure requiring intubation due to aspiration PNA (most recent intubation Aril 2020),  staph PNA, COVID-19 infection Dec 2020 presented with abd pain at the OSH.    #Acute alcohol associated hepatitis and cirrhosis   #Alcohol-related liver disease  #Hepatic steatosis  Patient with multiple hospitalizations in the past for alcohol withdrawal and DT per records. Unclear last alcohol drink. he was admitted with AMS and intubated with findings of cerebral edema and concern for USP. Now extubated on 9/23 with improvement in mental status s/p Plex x3, mannitol and hypertonic saline.    High MDF score of 49  - Calculated MELD 3.0 score 34 >>29  - Ascites: s/p paracentesis 10/1 with 800cc fluid removal, SAAG > 1.1, Total protein 2.5, negative for SBP  - Varices: no EGD on file  - HE: see above, now improved  - HCC: CT without lesion (not triple phase study)    Recommendations:   - To discuss inpatient OLT eval given persistently elevated MELD   - Complete infectious work-up including cultures  - Rifaximin 550 mg BID, lactulose to goal 3-4 BMs per day.   - Trend CBC, CMP & PT/INR daily  - Low sodium diet  - Thiamine, FA, MV supplementation  - Alcohol cessation counseling  - Remainder per primary team    Mayur Castillo MD  Gastroenterology/Hepatology Fellow  Available via [employer] Teams  Pager: [phone]    On Weekdays after 5 PM to 8AM and Weekends/Holidays (All day)  For non-urgent consults, please email [email or web address] or [email or web address]  For urgent consults, please contact on call GI team.  See Amion schedule ([facility]), [employer] system - [phone] ([facility]), or call hospital  ([facility])

## 2024-10-02 NOTE — PROGRESS NOTE ADULT - PROBLEM SELECTOR PLAN 4
RUE hematoma since 9/25 2/2 removal of R axillary a-line in MICU. Large heterogenous fluid collection noted on CT RUE w/ compression of neurovascular bundle  -hold a/c  -c/w elevation and warm compresses  - ortho and vascular recs appreciated  - given significant perioperative risk and limited benefit, shared decision making between surgical team and patient/family led to nonoperative management

## 2024-10-02 NOTE — PROGRESS NOTE ADULT - PROBLEM SELECTOR PLAN 10
-c/w labetalol 300 tid  - TTE 9/2023 - EF 65%  - if continued hypotension, would decrease labetalol -c/w labetalol 200 tid  - TTE 9/2023 - EF 65%  - if continued hypotension, would decrease labetalol

## 2024-10-02 NOTE — PROGRESS NOTE ADULT - SUBJECTIVE AND OBJECTIVE BOX
DATE OF SERVICE: 10-02-24 @ 07:13    Patient is a 59y old  Male who presents with a chief complaint of Acute Liver Failure (01 Oct 2024 19:43)      INTERVAL/OVERNIGHT:    SUBJECTIVE:    MEDICATIONS  (STANDING):  chlorhexidine 4% Liquid 1 Application(s) Topical <User Schedule>  dextrose 5%. 1000 milliLiter(s) (50 mL/Hr) IV Continuous <Continuous>  dextrose 5%. 1000 milliLiter(s) (100 mL/Hr) IV Continuous <Continuous>  dextrose 50% Injectable 25 Gram(s) IV Push once  dextrose 50% Injectable 12.5 Gram(s) IV Push once  dextrose 50% Injectable 25 Gram(s) IV Push once  dextrose Oral Gel 15 Gram(s) Oral once  folic acid Injectable 1 milliGRAM(s) IV Push daily  glucagon  Injectable 1 milliGRAM(s) IntraMuscular once  labetalol 200 milliGRAM(s) Oral three times a day  lactulose Syrup 30 Gram(s) Oral every 8 hours  mupirocin 2% Nasal 1 Application(s) Both Nostrils two times a day  pantoprazole  Injectable 40 milliGRAM(s) IV Push two times a day  polyethylene glycol 3350 17 Gram(s) Oral daily  QUEtiapine 25 milliGRAM(s) Oral daily  QUEtiapine 25 milliGRAM(s) Oral at bedtime  rifAXIMin 550 milliGRAM(s) Oral two times a day  thiamine 100 milliGRAM(s) Oral daily    MEDICATIONS  (PRN):      Vital Signs Last 24 Hrs  T(C): 37.6 (02 Oct 2024 04:15), Max: 37.6 (02 Oct 2024 04:15)  T(F): 99.6 (02 Oct 2024 04:15), Max: 99.6 (02 Oct 2024 04:15)  HR: 73 (02 Oct 2024 04:15) (66 - 73)  BP: 106/60 (02 Oct 2024 04:15) (106/60 - 142/84)  BP(mean): --  RR: 18 (02 Oct 2024 04:15) (18 - 18)  SpO2: 97% (02 Oct 2024 04:15) (97% - 99%)    Parameters below as of 02 Oct 2024 04:15  Patient On (Oxygen Delivery Method): room air      CAPILLARY BLOOD GLUCOSE      POCT Blood Glucose.: 118 mg/dL (01 Oct 2024 18:25)    I&O's Summary    01 Oct 2024 07:01  -  02 Oct 2024 07:00  --------------------------------------------------------  IN: 0 mL / OUT: 2300 mL / NET: -2300 mL        PHYSICAL EXAM:  GENERAL: NAD,  HEAD:  Atraumatic, Normocephalic  EYES: anicteric  NECK: Supple, No JVD  CHEST/LUNG: Clear to auscultation bilaterally; No wheeze  HEART: Regular rate and rhythm; No murmurs, rubs, or gallops  ABDOMEN: Soft, Nontender, Nondistended  EXTREMITIES: No b/l LE edema  NEUROLOGY: A&Ox3, non-focal  SKIN: No rashes or lesions    LABS:                        7.5    4.85  )-----------( 179      ( 01 Oct 2024 11:36 )             22.3     10-01    139  |  112[H]  |  11  ----------------------------<  90  3.7   |  15[L]  |  1.25    Ca    8.2[L]      01 Oct 2024 11:36  Phos  3.0     10-01  Mg     1.6     10-01    TPro  6.8  /  Alb  2.3[L]  /  TBili  18.4[H]  /  DBili  x   /  AST  77[H]  /  ALT  64[H]  /  AlkPhos  135[H]  10-01    PT/INR - ( 01 Oct 2024 11:36 )   PT: 22.5 sec;   INR: 1.97 ratio         PTT - ( 01 Oct 2024 11:36 )  PTT:34.6 sec      Urinalysis Basic - ( 01 Oct 2024 11:36 )    Color: x / Appearance: x / SG: x / pH: x  Gluc: 90 mg/dL / Ketone: x  / Bili: x / Urobili: x   Blood: x / Protein: x / Nitrite: x   Leuk Esterase: x / RBC: x / WBC x   Sq Epi: x / Non Sq Epi: x / Bacteria: x        RADIOLOGY & ADDITIONAL TESTS:    Imaging Personally Reviewed:    Consultant(s) Notes Reviewed:      Care Discussed with Consultants/Other Providers:   DATE OF SERVICE: 10-02-24 @ 07:13    Patient is a 59y old  Male who presents with a chief complaint of Acute Liver Failure (01 Oct 2024 19:43)      INTERVAL/OVERNIGHT: NAOE    SUBJECTIVE: Patient endorses continued R wrist drop and abd distension. Otherwise feels well, pain is controlled, denies SOB.    MEDICATIONS  (STANDING):  chlorhexidine 4% Liquid 1 Application(s) Topical <User Schedule>  dextrose 5%. 1000 milliLiter(s) (50 mL/Hr) IV Continuous <Continuous>  dextrose 5%. 1000 milliLiter(s) (100 mL/Hr) IV Continuous <Continuous>  dextrose 50% Injectable 25 Gram(s) IV Push once  dextrose 50% Injectable 12.5 Gram(s) IV Push once  dextrose 50% Injectable 25 Gram(s) IV Push once  dextrose Oral Gel 15 Gram(s) Oral once  folic acid Injectable 1 milliGRAM(s) IV Push daily  glucagon  Injectable 1 milliGRAM(s) IntraMuscular once  labetalol 200 milliGRAM(s) Oral three times a day  lactulose Syrup 30 Gram(s) Oral every 8 hours  mupirocin 2% Nasal 1 Application(s) Both Nostrils two times a day  pantoprazole  Injectable 40 milliGRAM(s) IV Push two times a day  polyethylene glycol 3350 17 Gram(s) Oral daily  QUEtiapine 25 milliGRAM(s) Oral daily  QUEtiapine 25 milliGRAM(s) Oral at bedtime  rifAXIMin 550 milliGRAM(s) Oral two times a day  thiamine 100 milliGRAM(s) Oral daily    MEDICATIONS  (PRN):      Vital Signs Last 24 Hrs  T(C): 37.6 (02 Oct 2024 04:15), Max: 37.6 (02 Oct 2024 04:15)  T(F): 99.6 (02 Oct 2024 04:15), Max: 99.6 (02 Oct 2024 04:15)  HR: 73 (02 Oct 2024 04:15) (66 - 73)  BP: 106/60 (02 Oct 2024 04:15) (106/60 - 142/84)  BP(mean): --  RR: 18 (02 Oct 2024 04:15) (18 - 18)  SpO2: 97% (02 Oct 2024 04:15) (97% - 99%)    Parameters below as of 02 Oct 2024 04:15  Patient On (Oxygen Delivery Method): room air      CAPILLARY BLOOD GLUCOSE      POCT Blood Glucose.: 118 mg/dL (01 Oct 2024 18:25)    I&O's Summary    01 Oct 2024 07:01  -  02 Oct 2024 07:00  --------------------------------------------------------  IN: 0 mL / OUT: 2300 mL / NET: -2300 mL        PHYSICAL EXAM:  GENERAL: NAD,  HEAD:  Atraumatic, Normocephalic  EYES: +scleral icterus  NECK: Supple, No JVD  CHEST/LUNG: Clear to auscultation bilaterally; No wheeze  HEART: Regular rate and rhythm; No murmurs, rubs, or gallops  ABDOMEN: Soft, distended, nontender  EXTREMITIES: No b/l LE edema. 1+ b/l UE radial pulses  NEUROLOGY: A&Ox2, +R wrist drop  SKIN: +jaundice    LABS:                        7.5    4.85  )-----------( 179      ( 01 Oct 2024 11:36 )             22.3     10-01    139  |  112[H]  |  11  ----------------------------<  90  3.7   |  15[L]  |  1.25    Ca    8.2[L]      01 Oct 2024 11:36  Phos  3.0     10-01  Mg     1.6     10-01    TPro  6.8  /  Alb  2.3[L]  /  TBili  18.4[H]  /  DBili  x   /  AST  77[H]  /  ALT  64[H]  /  AlkPhos  135[H]  10-01    PT/INR - ( 01 Oct 2024 11:36 )   PT: 22.5 sec;   INR: 1.97 ratio         PTT - ( 01 Oct 2024 11:36 )  PTT:34.6 sec      Urinalysis Basic - ( 01 Oct 2024 11:36 )    Color: x / Appearance: x / SG: x / pH: x  Gluc: 90 mg/dL / Ketone: x  / Bili: x / Urobili: x   Blood: x / Protein: x / Nitrite: x   Leuk Esterase: x / RBC: x / WBC x   Sq Epi: x / Non Sq Epi: x / Bacteria: x        RADIOLOGY & ADDITIONAL TESTS:    Imaging Personally Reviewed:    Consultant(s) Notes Reviewed:      Care Discussed with Consultants/Other Providers:

## 2024-10-02 NOTE — PROGRESS NOTE ADULT - PROBLEM SELECTOR PLAN 3
New onset ascites, never tapped prior  - s/p para 10/1/24 w/ SAAG 1.2 consistent with portal hypertension  - 143 PMNs, not consistent w/ SBP

## 2024-10-02 NOTE — PRE-ANESTHESIA EVALUATION ADULT - BP NONINVASIVE DIASTOLIC (MM HG)
Large Joint Aspiration/Injection: R knee    Date/Time: 7/27/2023 7:45 AM  Performed by: Kedar Garcia MD  Authorized by: Kedar Garcia MD     Consent Done?:  Yes (Verbal)  Indications:  Arthritis and pain  Site marked: the procedure site was marked    Timeout: prior to procedure the correct patient, procedure, and site was verified    Prep: patient was prepped and draped in usual sterile fashion      Local anesthesia used?: Yes    Local anesthetic:  Lidocaine 1% without epinephrine    Details:  Needle Size:  25 G  Ultrasonic Guidance for needle placement?: No    Location:  Knee  Site:  R knee  Medications:  40 mg methylPREDNISolone acetate 40 mg/mL  Patient tolerance:  Patient tolerated the procedure well with no immediate complications  Large Joint Aspiration/Injection: L knee    Date/Time: 7/27/2023 7:45 AM  Performed by: Kedar Garcia MD  Authorized by: Kedar Garcia MD     Consent Done?:  Yes (Verbal)  Indications:  Arthritis and pain  Site marked: the procedure site was marked    Timeout: prior to procedure the correct patient, procedure, and site was verified    Prep: patient was prepped and draped in usual sterile fashion      Local anesthesia used?: Yes    Local anesthetic:  Lidocaine 1% without epinephrine    Details:  Needle Size:  25 G  Ultrasonic Guidance for needle placement?: No    Location:  Knee  Site:  L knee  Medications:  40 mg methylPREDNISolone acetate 40 mg/mL  Patient tolerance:  Patient tolerated the procedure well with no immediate complications   60

## 2024-10-02 NOTE — PROGRESS NOTE ADULT - ASSESSMENT
59 year old M with PMHx ETOH abuse with frequent admissions for withdrawal & gastritis initially admitted to ICU for acute on chronic decompensated liver failure & alcohol withdrawal. S/p intubation for airway protection (9/17-9/23). s/p PLEX for hepatic encephalopathy. Course c/b abdominal distension 2/2 ascites vs. distended loops of bowel (patient not clinically obstructed). Course also c/b RUE IV infiltration with subsequent hematoma vs. fluid collection w/ neurovascular compression and resulting R wrist drop. 59 year old M with PMHx ETOH abuse with frequent admissions for withdrawal & gastritis initially admitted to ICU for acute on chronic decompensated liver failure & alcohol withdrawal. S/p intubation for airway protection (9/17-9/23). s/p PLEX for hepatic encephalopathy. Course c/b abdominal distension 2/2 ascites vs. distended loops of bowel (patient not clinically obstructed). Course c/b R wrist drop 2/2 R axillary hematoma compressing neurovascular bundle; shared decision making with surgical team is nonoperative management.

## 2024-10-02 NOTE — CONSULT NOTE ADULT - SUBJECTIVE AND OBJECTIVE BOX
Chief Complaint:  AMS    HPI:    Mr. Degroot is a 59-year-old male with alcohol use disorder, frequent hospitalizations for alcohol withdrawal and delirium tremens, alcoholic gastritis, peptic ulcer disease, and fatty liver disease. He was admitted with abdominal pain and altered mental status, requiring intubation for acute liver failure and cerebral edema. His alcohol level was <10 on admission, and he was treated with mannitol, hypertonic saline, and plasmapheresis. He is now extubated with improved mental status and liver enzymes.  He was evaluated by the Hepatology service and deemed suitable for OLT evaluation.       Allergies:  No Known Allergies      Hospital Medications:  chlorhexidine 4% Liquid 1 Application(s) Topical <User Schedule>  dextrose 5%. 1000 milliLiter(s) IV Continuous <Continuous>  dextrose 5%. 1000 milliLiter(s) IV Continuous <Continuous>  dextrose 50% Injectable 12.5 Gram(s) IV Push once  dextrose 50% Injectable 25 Gram(s) IV Push once  dextrose 50% Injectable 25 Gram(s) IV Push once  dextrose Oral Gel 15 Gram(s) Oral once  folic acid 1 milliGRAM(s) Oral daily  glucagon  Injectable 1 milliGRAM(s) IntraMuscular once  labetalol 200 milliGRAM(s) Oral three times a day  lactulose Syrup 30 Gram(s) Oral every 8 hours  mupirocin 2% Nasal 1 Application(s) Both Nostrils two times a day  pantoprazole    Tablet 40 milliGRAM(s) Oral before breakfast  polyethylene glycol 3350 17 Gram(s) Oral daily  QUEtiapine 25 milliGRAM(s) Oral at bedtime  rifAXIMin 550 milliGRAM(s) Oral two times a day  thiamine 100 milliGRAM(s) Oral daily      PMHX/PSHX:  Alcohol abuse    PUD (peptic ulcer disease)    Mixed hyperlipidemia    EtOH dependence    Gastritis    Substance abuse    DTs (delirium tremens)    HTN (hypertension)    Elevated lipase    No significant past surgical history        Family history:  No pertinent family history in first degree relatives (Father, Mother)    No pertinent family history in first degree relatives    No pertinent family history in first degree relatives    FH: HTN (hypertension)        Social History:   No alcohol or smoking    ROS:   See HPI    PHYSICAL EXAM:   GENERAL:  NAD, resting comfortably in bed  HEENT:  Sclera anicteric  RESPIRATORY:  Normal effort  CARDIAC:  HDS  ABDOMEN:  Soft, non-tender, non-distended  EXTREMITIES:  No edema  SKIN:  Warm & Dry. No jaundice.   NEURO:  Alert, conversant, no focal deficit    Vital Signs:  Vital Signs Last 24 Hrs  T(C): 36.7 (02 Oct 2024 11:33), Max: 37.6 (02 Oct 2024 04:15)  T(F): 98.1 (02 Oct 2024 11:33), Max: 99.6 (02 Oct 2024 04:15)  HR: 71 (02 Oct 2024 14:38) (69 - 101)  BP: 117/69 (02 Oct 2024 14:38) (106/60 - 137/82)  BP(mean): 101 (02 Oct 2024 09:18) (101 - 101)  RR: 18 (02 Oct 2024 11:33) (18 - 18)  SpO2: 97% (02 Oct 2024 11:33) (97% - 99%)    Parameters below as of 02 Oct 2024 11:33  Patient On (Oxygen Delivery Method): room air      Daily Height in cm: 182.88 (02 Oct 2024 09:18)    Daily Weight in k.2 (02 Oct 2024 10:57)    LABS:                        7.3    4.24  )-----------( 212      ( 02 Oct 2024 10:06 )             22.5     Mean Cell Volume: 82.4 fl (10-02-24 @ 10:06)    10    136  |  110[H]  |  9   ----------------------------<  71  3.6   |  16[L]  |  1.08    Ca    8.1[L]      02 Oct 2024 10:06  Phos  2.8     10  Mg     1.6     10    TPro  6.8  /  Alb  2.3[L]  /  TBili  18.4[H]  /  DBili  x   /  AST  77[H]  /  ALT  64[H]  /  AlkPhos  135[H]  10    LIVER FUNCTIONS - ( 01 Oct 2024 11:36 )  Alb: 2.3 g/dL / Pro: 6.8 g/dL / ALK PHOS: 135 U/L / ALT: 64 U/L / AST: 77 U/L / GGT: x           PT/INR - ( 02 Oct 2024 10:06 )   PT: 23.2 sec;   INR: 2.05 ratio         PTT - ( 01 Oct 2024 11:36 )  PTT:34.6 sec  Urinalysis Basic - ( 02 Oct 2024 10:06 )    Color: x / Appearance: x / SG: x / pH: x  Gluc: 71 mg/dL / Ketone: x  / Bili: x / Urobili: x   Blood: x / Protein: x / Nitrite: x   Leuk Esterase: x / RBC: x / WBC x   Sq Epi: x / Non Sq Epi: x / Bacteria: x                              7.3    4.24  )-----------( 212      ( 02 Oct 2024 10:06 )             22.5                         7.5    4.85  )-----------( 179      ( 01 Oct 2024 11:36 )             22.3     Imaging:   Chief Complaint:  AMS    HPI:    Mr. Degroot is a 59-year-old male with alcohol use disorder, frequent hospitalizations for alcohol withdrawal and delirium tremens, alcoholic gastritis, peptic ulcer disease, and fatty liver disease. He was admitted with abdominal pain and altered mental status, requiring intubation for acute liver failure and cerebral edema. His alcohol level was <10 on admission, and he was treated with mannitol, hypertonic saline, and plasmapheresis. He is now extubated with improved mental status and liver enzymes.  He was evaluated by the Hepatology service and deemed suitable for possible OLT evaluation.     Allergies:  No Known Allergies    Hospital Medications:  chlorhexidine 4% Liquid 1 Application(s) Topical <User Schedule>  dextrose 5%. 1000 milliLiter(s) IV Continuous <Continuous>  dextrose 5%. 1000 milliLiter(s) IV Continuous <Continuous>  dextrose 50% Injectable 12.5 Gram(s) IV Push once  dextrose 50% Injectable 25 Gram(s) IV Push once  dextrose 50% Injectable 25 Gram(s) IV Push once  dextrose Oral Gel 15 Gram(s) Oral once  folic acid 1 milliGRAM(s) Oral daily  glucagon  Injectable 1 milliGRAM(s) IntraMuscular once  labetalol 200 milliGRAM(s) Oral three times a day  lactulose Syrup 30 Gram(s) Oral every 8 hours  mupirocin 2% Nasal 1 Application(s) Both Nostrils two times a day  pantoprazole    Tablet 40 milliGRAM(s) Oral before breakfast  polyethylene glycol 3350 17 Gram(s) Oral daily  QUEtiapine 25 milliGRAM(s) Oral at bedtime  rifAXIMin 550 milliGRAM(s) Oral two times a day  thiamine 100 milliGRAM(s) Oral daily      PMHX/PSHX:  Alcohol abuse    PUD (peptic ulcer disease)    Mixed hyperlipidemia    EtOH dependence    Gastritis    Substance abuse    DTs (delirium tremens)    HTN (hypertension)    Elevated lipase    No significant past surgical history    Family history:  No pertinent family history in first degree relatives (Father, Mother)    FH: HTN (hypertension)    Social History:   see hpi    ROS:   See HPI    PHYSICAL EXAM:   GENERAL:  NAD, resting comfortably in bed  HEENT:  Sclera icteric  RESPIRATORY:  Normal effort  CARDIAC:  HDS  ABDOMEN:  Soft, non-tender, non-distended  EXTREMITIES:  RUE edema  SKIN:  Warm & Dry. jaundice.   NEURO:  Alert, conversant, no focal deficit. AOx3, no asterixis    Vital Signs:  Vital Signs Last 24 Hrs  T(C): 36.7 (02 Oct 2024 11:33), Max: 37.6 (02 Oct 2024 04:15)  T(F): 98.1 (02 Oct 2024 11:33), Max: 99.6 (02 Oct 2024 04:15)  HR: 71 (02 Oct 2024 14:38) (69 - 101)  BP: 117/69 (02 Oct 2024 14:38) (106/60 - 137/82)  BP(mean): 101 (02 Oct 2024 09:18) (101 - 101)  RR: 18 (02 Oct 2024 11:33) (18 - 18)  SpO2: 97% (02 Oct 2024 11:33) (97% - 99%)    Parameters below as of 02 Oct 2024 11:33  Patient On (Oxygen Delivery Method): room air      Daily Height in cm: 182.88 (02 Oct 2024 09:18)    Daily Weight in k.2 (02 Oct 2024 10:57)    LABS:                        7.3    4.24  )-----------( 212      ( 02 Oct 2024 10:06 )             22.5     Mean Cell Volume: 82.4 fl (10-02-24 @ 10:06)    10    136  |  110[H]  |  9   ----------------------------<  71  3.6   |  16[L]  |  1.08    Ca    8.1[L]      02 Oct 2024 10:06  Phos  2.8     10  Mg     1.6     10-02    TPro  6.8  /  Alb  2.3[L]  /  TBili  18.4[H]  /  DBili  x   /  AST  77[H]  /  ALT  64[H]  /  AlkPhos  135[H]  10    LIVER FUNCTIONS - ( 01 Oct 2024 11:36 )  Alb: 2.3 g/dL / Pro: 6.8 g/dL / ALK PHOS: 135 U/L / ALT: 64 U/L / AST: 77 U/L / GGT: x           PT/INR - ( 02 Oct 2024 10:06 )   PT: 23.2 sec;   INR: 2.05 ratio         PTT - ( 01 Oct 2024 11:36 )  PTT:34.6 sec  Urinalysis Basic - ( 02 Oct 2024 10:06 )    Color: x / Appearance: x / SG: x / pH: x  Gluc: 71 mg/dL / Ketone: x  / Bili: x / Urobili: x   Blood: x / Protein: x / Nitrite: x   Leuk Esterase: x / RBC: x / WBC x   Sq Epi: x / Non Sq Epi: x / Bacteria: x                              7.3    4.24  )-----------( 212      ( 02 Oct 2024 10:06 )             22.5                         7.5    4.85  )-----------( 179      ( 01 Oct 2024 11:36 )             22.3     Imaging:

## 2024-10-02 NOTE — PROGRESS NOTE ADULT - PROBLEM SELECTOR PLAN 11
DVT ppx: hold ac for hematoma  DIet: DASH  Dispo: pending re-eval w/ PT DVT ppx: hold ac for hematoma; SCDs  DIet: DASH  Dispo: acute rehab, pending PM&R eval

## 2024-10-02 NOTE — CONSULT NOTE ADULT - ATTENDING COMMENTS
This is a 59-year-old gentleman with a history of alcohol associated liver disease, AUD complicated by multiple episodes of withdrawal for which she has been admitted recently in June, July, August, 2024.  History is limited as he is currently altered.     He has acute on chronic liver failure.  The inciting factor/acute trigger is unclear at this time but I suspect this is severe ischemic hepatopathy in the setting of hypotension in the setting of an already compromised liver.  I am told that his liver enzymes at the outside hospital facility was in the 100s on arrival and his overall picture initially looked like alcohol associated hepatitis.  He then had an RRT for altered mental status and hypotension -with systolics in the 60s and was subsequently noted to have uptrending liver chemistries to as high as 17,000 and INR of 7.  He was transferred to [facility] for consultation with hepatology.    This gentleman has known prior liver disease from alcohol use.  He has had multiple admissions with alcohol withdrawal in the past few months.  His overall prognosis appears poor but there is some level of reassurance that his liver chemistries appear to be plateauing.  His liver injury is consistent with ischemia -ALT to AST ratio greater than 1, lactic acid is significantly elevated.  No LDH available.  His AST also is starting to trend downwards much faster than he is ALT which is typical of ischemia. Also noted to have ROSA which is improving.    Tylenol level was also noted to be slightly elevated and as such he was started on NAC.  I would recommend continuing this drip until his enzymes drift down significantly.    Agree with TEG and replenishing his deficits.  His Phos is low which is also reassuring -shows evidence of liver regeneration.  I expect that his bilirubin and AST will lag.     Continue with supportive care.  Recommend rectal lactulose if unable to place an NG tube.  Ideally though, he should have an NG tube if possible for lactulose.     Full infectious work up. EMpiric abx for now pending infectious work up.     Not a great transplant candidate given his multiple admissions with withdrawal recently.     Liver team will continue to follow along.    Please do not hesitate to reach out with questions.    Liliya Patel MD/MPH  Transplant Hepatology
as above. extensive discussion w pt, fam, other providers  axillary hematoma likely causing nerve compression.  In general would rec hematoma evac to help alleviate sx.  However, there is concern for excessively high periprocedural mortality, and therefore will not perform surgical intervention (life over limb)
Seen at bedside   Right arm edema and ecchymosis. Possible bleeding into or edema of soft tissue   Sepsis work up  Will discuss with family

## 2024-10-03 LAB
ALBUMIN SERPL ELPH-MCNC: 2.2 G/DL — LOW (ref 3.3–5)
ALP SERPL-CCNC: 134 U/L — HIGH (ref 40–120)
ALT FLD-CCNC: 54 U/L — HIGH (ref 10–45)
ANION GAP SERPL CALC-SCNC: 10 MMOL/L — SIGNIFICANT CHANGE UP (ref 5–17)
APTT BLD: 36.1 SEC — HIGH (ref 24.5–35.6)
AST SERPL-CCNC: 88 U/L — HIGH (ref 10–40)
BILIRUB SERPL-MCNC: 18.2 MG/DL — HIGH (ref 0.2–1.2)
BUN SERPL-MCNC: 9 MG/DL — SIGNIFICANT CHANGE UP (ref 7–23)
CALCIUM SERPL-MCNC: 8.2 MG/DL — LOW (ref 8.4–10.5)
CHLORIDE SERPL-SCNC: 108 MMOL/L — SIGNIFICANT CHANGE UP (ref 96–108)
CO2 SERPL-SCNC: 15 MMOL/L — LOW (ref 22–31)
CREAT SERPL-MCNC: 1.03 MG/DL — SIGNIFICANT CHANGE UP (ref 0.5–1.3)
EGFR: 85 ML/MIN/1.73M2 — SIGNIFICANT CHANGE UP
GLUCOSE SERPL-MCNC: 84 MG/DL — SIGNIFICANT CHANGE UP (ref 70–99)
HCT VFR BLD CALC: 22.8 % — LOW (ref 39–50)
HGB BLD-MCNC: 7.5 G/DL — LOW (ref 13–17)
INR BLD: 2.13 RATIO — HIGH (ref 0.85–1.16)
MAGNESIUM SERPL-MCNC: 1.4 MG/DL — LOW (ref 1.6–2.6)
MCHC RBC-ENTMCNC: 26.3 PG — LOW (ref 27–34)
MCHC RBC-ENTMCNC: 32.9 GM/DL — SIGNIFICANT CHANGE UP (ref 32–36)
MCV RBC AUTO: 80 FL — SIGNIFICANT CHANGE UP (ref 80–100)
NRBC # BLD: 0 /100 WBCS — SIGNIFICANT CHANGE UP (ref 0–0)
PHOSPHATE SERPL-MCNC: 2.3 MG/DL — LOW (ref 2.5–4.5)
PLATELET # BLD AUTO: 180 K/UL — SIGNIFICANT CHANGE UP (ref 150–400)
POTASSIUM SERPL-MCNC: 3.9 MMOL/L — SIGNIFICANT CHANGE UP (ref 3.5–5.3)
POTASSIUM SERPL-SCNC: 3.9 MMOL/L — SIGNIFICANT CHANGE UP (ref 3.5–5.3)
PROT SERPL-MCNC: 7.1 G/DL — SIGNIFICANT CHANGE UP (ref 6–8.3)
PROTHROM AB SERPL-ACNC: 24.3 SEC — HIGH (ref 9.9–13.4)
RBC # BLD: 2.85 M/UL — LOW (ref 4.2–5.8)
RBC # FLD: 32 % — HIGH (ref 10.3–14.5)
SODIUM SERPL-SCNC: 133 MMOL/L — LOW (ref 135–145)
WBC # BLD: 4.43 K/UL — SIGNIFICANT CHANGE UP (ref 3.8–10.5)
WBC # FLD AUTO: 4.43 K/UL — SIGNIFICANT CHANGE UP (ref 3.8–10.5)

## 2024-10-03 PROCEDURE — 99222 1ST HOSP IP/OBS MODERATE 55: CPT

## 2024-10-03 PROCEDURE — 99233 SBSQ HOSP IP/OBS HIGH 50: CPT

## 2024-10-03 RX ORDER — CHLORHEXIDINE GLUCONATE ORAL RINSE 1.2 MG/ML
1 SOLUTION DENTAL DAILY
Refills: 0 | Status: DISCONTINUED | OUTPATIENT
Start: 2024-10-03 | End: 2024-10-11

## 2024-10-03 RX ORDER — QUETIAPINE FUMARATE 50 MG/1
12.5 TABLET, FILM COATED ORAL AT BEDTIME
Refills: 0 | Status: COMPLETED | OUTPATIENT
Start: 2024-10-03 | End: 2024-10-06

## 2024-10-03 RX ORDER — ACETAMINOPHEN 325 MG
1000 TABLET ORAL ONCE
Refills: 0 | Status: COMPLETED | OUTPATIENT
Start: 2024-10-03 | End: 2024-10-03

## 2024-10-03 RX ORDER — SODIUM PHOSPHATE IN D5W 15MMOL/250
15 PLASTIC BAG, INJECTION (ML) INTRAVENOUS ONCE
Refills: 0 | Status: COMPLETED | OUTPATIENT
Start: 2024-10-03 | End: 2024-10-03

## 2024-10-03 RX ORDER — MAGNESIUM SULFATE 500 MG/ML
2 VIAL (ML) INJECTION
Refills: 0 | Status: COMPLETED | OUTPATIENT
Start: 2024-10-03 | End: 2024-10-03

## 2024-10-03 RX ADMIN — Medication 25 GRAM(S): at 13:20

## 2024-10-03 RX ADMIN — FOLIC ACID 1 MILLIGRAM(S): 1 TABLET ORAL at 11:19

## 2024-10-03 RX ADMIN — MUPIROCIN 1 APPLICATION(S): 20 OINTMENT TOPICAL at 07:07

## 2024-10-03 RX ADMIN — Medication 400 MILLIGRAM(S): at 22:50

## 2024-10-03 RX ADMIN — LACTULOSE 30 GRAM(S): 10 SOLUTION ORAL; RECTAL at 07:07

## 2024-10-03 RX ADMIN — THIAMINE HYDROCHLORIDE 100 MILLIGRAM(S): 100 INJECTION, SOLUTION INTRAMUSCULAR; INTRAVENOUS at 11:19

## 2024-10-03 RX ADMIN — Medication 1000 MILLIGRAM(S): at 23:50

## 2024-10-03 RX ADMIN — PANTOPRAZOLE SODIUM 40 MILLIGRAM(S): 40 TABLET, DELAYED RELEASE ORAL at 07:06

## 2024-10-03 RX ADMIN — LABETALOL HYDROCHLORIDE 200 MILLIGRAM(S): 200 TABLET, FILM COATED ORAL at 22:21

## 2024-10-03 RX ADMIN — LABETALOL HYDROCHLORIDE 200 MILLIGRAM(S): 200 TABLET, FILM COATED ORAL at 07:06

## 2024-10-03 RX ADMIN — LABETALOL HYDROCHLORIDE 200 MILLIGRAM(S): 200 TABLET, FILM COATED ORAL at 13:26

## 2024-10-03 RX ADMIN — LACTULOSE 30 GRAM(S): 10 SOLUTION ORAL; RECTAL at 22:21

## 2024-10-03 RX ADMIN — Medication 63.75 MILLIMOLE(S): at 11:19

## 2024-10-03 RX ADMIN — CHLORHEXIDINE GLUCONATE ORAL RINSE 1 APPLICATION(S): 1.2 SOLUTION DENTAL at 07:08

## 2024-10-03 RX ADMIN — LACTULOSE 30 GRAM(S): 10 SOLUTION ORAL; RECTAL at 13:22

## 2024-10-03 RX ADMIN — Medication 17 GRAM(S): at 11:19

## 2024-10-03 RX ADMIN — QUETIAPINE FUMARATE 12.5 MILLIGRAM(S): 50 TABLET, FILM COATED ORAL at 22:21

## 2024-10-03 RX ADMIN — Medication 25 GRAM(S): at 11:19

## 2024-10-03 NOTE — PROVIDER CONTACT NOTE (OTHER) - RECOMMENDATIONS
does MD want medications given, if so please change diet order to NPO except medications
notify provider, repeat RUE duplex. Last one from 9/24

## 2024-10-03 NOTE — PROGRESS NOTE ADULT - ASSESSMENT
59 years old male with h/o chronic ETOH abuse and dependence (1 bottle of Vodka a day) with long standing hx of alcohol withdrawal and DTs with frequent hospital/ICU admissions, alcoholic gastritis/esophagitis, PUD, HLD, hx of hypoxic respiratory failure requiring intubation due to aspiration PNA (most recent intubation Aril 2020),  staph PNA, COVID-19 infection Dec 2020 presented with abd pain at the OSH.    #Acute alcohol associated hepatitis and cirrhosis   #Alcohol-related liver disease  #Hepatic steatosis  Patient with multiple hospitalizations in the past for alcohol withdrawal and DT per records. Unclear last alcohol drink. he was admitted with AMS and intubated with findings of cerebral edema and concern for skilled nursing. Now extubated on 9/23 with improvement in mental status s/p Plex x3, mannitol and hypertonic saline.    High MDF score of 49  - Calculated MELD 3.0 score 34 >>29  - Ascites: s/p paracentesis 10/1 with 800cc fluid removal, SAAG > 1.1, Total protein 2.5, negative for SBP  - Varices: no EGD on file  - HE: see above, now improved  - HCC: CT without lesion (not triple phase study)    Recommendations:   - Plan to transfer to Transplant Hepatology service and initiate inpatient OLT evaluation given elevated MELD  - Rifaximin 550 mg BID, lactulose to goal 3-4 BMs per day.   - Trend CBC, CMP & PT/INR daily  - Low sodium diet  - Thiamine, FA, MV supplementation  - Alcohol cessation counseling  - Psych, social work evals  - PT/OT/Nutrition     Mayur Castillo MD  Gastroenterology/Hepatology Fellow  Available via [employer] Teams  Pager: [phone]    On Weekdays after 5 PM to 8AM and Weekends/Holidays (All day)  For non-urgent consults, please email [email or web address]  For urgent consults, please contact on call GI team.  See Amion schedule ([facility]), Sonendoing system - [phone] ([facility]), or call hospital  ([facility])

## 2024-10-03 NOTE — PROGRESS NOTE ADULT - ASSESSMENT
59 year old M with PMHx ETOH abuse with frequent admissions for withdrawal & gastritis initially admitted to ICU for acute on chronic decompensated liver failure & alcohol withdrawal. S/p intubation for airway protection (9/17-9/23). s/p PLEX for hepatic encephalopathy. Course c/b abdominal distension 2/2 ascites vs. distended loops of bowel (patient not clinically obstructed). Course c/b R wrist drop 2/2 R axillary hematoma compressing neurovascular bundle; shared decision making with surgical team is nonoperative management. 59 year old M with PMHx ETOH abuse with frequent admissions for withdrawal & gastritis initially admitted to ICU for acute on chronic decompensated liver failure & alcohol withdrawal. S/p intubation for airway protection (9/17-9/23). s/p PLEX for hepatic encephalopathy. Course c/b abdominal distension 2/2 ascites vs. distended loops of bowel (patient not clinically obstructed). Course c/b R wrist drop 2/2 R axillary hematoma compressing neurovascular bundle; shared decision making with surgical team is nonoperative management. Planned for transfer to liver transplant service.

## 2024-10-03 NOTE — PROVIDER CONTACT NOTE (OTHER) - ASSESSMENT
RUE swelling, painful, red, unable to move without restrictions. Pt states he is unsure if it is getting better or not.
12pm medications due, patient NPO for endoscopy. patient s/p IR for paracentesis this AM

## 2024-10-03 NOTE — PROGRESS NOTE ADULT - PROBLEM SELECTOR PLAN 4
RUE hematoma since 9/25 2/2 removal of R axillary a-line in MICU. Large heterogenous fluid collection noted on CT RUE w/ compression of neurovascular bundle  -hold a/c  -c/w elevation and warm compresses  - ortho and vascular recs appreciated  - given significant perioperative risk and limited benefit, shared decision making between surgical team and patient/family led to nonoperative management New onset ascites, never tapped prior  - s/p para 10/1/24 w/ SAAG 1.2 consistent with portal hypertension  - 143 PMNs, not consistent w/ SBP

## 2024-10-03 NOTE — PROGRESS NOTE ADULT - PROBLEM SELECTOR PLAN 6
-required 1 u pRBC 9/23  -Hgb dropped from   -1u pRBC on 9/26-> repeat CBC stable   - per hepatology, patient has no clinical bleeding, so EGD is not necessary

## 2024-10-03 NOTE — CONSULT NOTE ADULT - ASSESSMENT
Assessment:  Mr. Degroot is a 59-year-old male with alcohol use disorder, frequent hospitalizations for alcohol withdrawal and delirium tremens, alcoholic gastritis, peptic ulcer disease, and fatty liver disease. He was admitted with abdominal pain and altered mental status, requiring intubation for acute liver failure and cerebral edema. His alcohol level was <10 on admission, and he was treated with mannitol, hypertonic saline, and plasmapheresis. He is now extubated with improved mental status and liver enzymes.  He was evaluated by the Hepatology service and deemed suitable for possible OLT evaluation. PM&R consulted for dispo recommendations.     Discussed acute rehab with patient who is amenable.     Plan:  - Continue care per primary team  - Continue cockup splint to RUE  - Continue PT/OT. May consider SLP for further eval of cognitive status.  - Given new functional deficits, patient would be a candidate for acute rehab. Will continue to follow.

## 2024-10-03 NOTE — CONSULT NOTE ADULT - SUBJECTIVE AND OBJECTIVE BOX
HPI:  Mr. Degroot is a 59-year-old male with alcohol use disorder, frequent hospitalizations for alcohol withdrawal and delirium tremens, alcoholic gastritis, peptic ulcer disease, and fatty liver disease. He was admitted with abdominal pain and altered mental status, requiring intubation for acute liver failure and cerebral edema. His alcohol level was <10 on admission, and he was treated with mannitol, hypertonic saline, and plasmapheresis. He is now extubated with improved mental status and liver enzymes.  He was evaluated by the Hepatology service and deemed suitable for possible OLT evaluation. PM&R consulted for dispo recommendations.     On evaluation today, patient endorses continued swelling and weakness in right arm. He states he felt good when he ambulated with PT. He feels improved since admission.      PMHX/PSHX:  Alcohol abuse    PUD (peptic ulcer disease)    Mixed hyperlipidemia    EtOH dependence    Gastritis    Substance abuse    DTs (delirium tremens)    HTN (hypertension)    Elevated lipase    No significant past surgical history    Family history:  No pertinent family history in first degree relatives (Father, Mother)    FH: HTN (hypertension)      SOCIAL HX  Tobacco: denies  Alcohol: drinks vodka +/- beer daily  Illicit drugs: denies  Lives with wife and 2 sons, 5STE    CURRENT FUNCTIONAL STATUS:  Bed mobility: modA  Sit-stand transfers: modA  Gait: modA with RW, 3 steps  Lower body dressing: maxA  Follows ~50% 1 step commands       Vital Signs Last 24 Hrs  T(C): 37.4 (03 Oct 2024 06:00), Max: 37.4 (03 Oct 2024 06:00)  T(F): 99.3 (03 Oct 2024 06:00), Max: 99.3 (03 Oct 2024 06:00)  HR: 74 (03 Oct 2024 06:00) (65 - 74)  BP: 116/63 (03 Oct 2024 06:00) (116/63 - 127/71)  BP(mean): --  RR: 18 (03 Oct 2024 06:00) (18 - 18)  SpO2: 98% (03 Oct 2024 06:00) (97% - 98%)    Parameters below as of 03 Oct 2024 06:00  Patient On (Oxygen Delivery Method): room air    PHYSICAL EXAM  Gen: NAD  HEENT: icteric sclera  CV: RRR, well perfused  Pulm: CTA B/L  Abd: soft, nontender  Ext: RUE edema  Skin: jaundice  Neuro: Alert, oriented to self and place, speech clear and fluent, comprehension intact  - 0/5 wrist extension otherwise 4+/5 RUE, 5/5 LUE and B/L LE  - allodynia in RUE, sensation intact in B/L LE      LABS:  |10-03-24 @ 08:27            7.5[L]  4.43>--------------<180            22.8[L]      133[L]  |  108  |  9  --------------------------<84  3.9  |  8.2[L]  |  1.03    Mg 1.4[L] / Phos2.3[L]    PT 24.3[H] / INR 2.13[H]    PTT 36.1[H]      IMAGING:  < from: CT Upper Extremity w/ IV Cont, Right (09.30.24 @ 18:57) >  IMPRESSION:    Heterogeneous nonsimple collection within the soft tissues of the right   axilla, which extends into the proximal to mid triceps musculature and   may extend into the proximalbiceps muscle, with mixed internal   attenuation. Findings may be related to a hematoma within the soft   tissues. Superimposed infection of this hematoma cannot be excluded.   Compartment syndrome is a clinical diagnosis. Correlate clinically for   signs of compartment syndrome. Additionally, recommend short-term   interval follow-up CT or MRI of the right shoulder/humerus in 8 weeks to   demonstrate resolution of this collection and to rule out an underlying   mass/neoplasm.    Collection envelops portions of the right axillary neurovascular bundle.   It is unclear if this collection communicates with the glenohumeral joint   space. If there is clinical concern for septic arthritis of the right   glenohumeral joint space, right glenohumeraljoint aspiration should be   performed.    Nonspecific subcutaneous edema about the right upper extremity, which may   be related to upper extremity edema and/or cellulitis.    Partially imaged abdominal ascites. Please see separate same day   abdomen/pelvis CT dictation for findings within the abdomen/pelvis.    < end of copied text >  < from: CT Abdomen and Pelvis w/ IV Cont (09.30.24 @ 18:54) >  IMPRESSION:  Diffuse small bowel ileus versus low-grade small bowel obstruction with   tapering transition point  at the distal ileum just proximal to the   ileocecal junction.    Mild gastritis.    Moderate volume abdominal pelvic ascites    Few right lower lobe patchy opacities, may be sequelae of infection   versus inflammation.    < end of copied text >  < from: CT Cervical Spine No Cont (09.30.24 @ 18:53) >  IMPRESSION:    No gross evidence of acute osseous fracture or willie dislocation.    < end of copied text >  < from: US Abdomen Limited (09.29.24 @ 22:18) >  IMPRESSION:    Small to moderate ascites, most prominent of the right hemiabdomen.    Few distended bowel loops are identified, incompletely characterized on   CT. Recommend CT abdomen pelvis for further evaluation given the history   of distention. Dr. Lees discussed these findings with Dr. Artur Bryson   on 9/29/2024 at 10:53 PM, with read back.    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    < end of copied text >  < from: [facility] Duplex Upper Ext Vein Scan, Right (09.24.24 @ 23:53) >  IMPRESSION:  No evidence of right upper extremity deep venous thrombosis.    Superficial thrombus noted in the right basilic vein.    4.2 x 4.3 x 6.4 cm avascular complex collection in the right axillary   fossa may represent a hematoma. Follow-up cross-sectional imaging   suggested to resolution.    < end of copied text >  < from: VA Duplex Lower Ext Vein Scan, Bilat (09.20.24 @ 14:31) >  IMPRESSION:  No evidence of deep venous thrombosis in either lower extremity.    < end of copied text >

## 2024-10-03 NOTE — PROGRESS NOTE ADULT - PROBLEM SELECTOR PLAN 11
DVT ppx: hold ac for hematoma; SCDs  DIet: DASH  Dispo: acute rehab, pending PM&R eval DVT ppx: hold ac for hematoma; SCDs  DIet: DASH  Dispo: transfer to liver transplant floor, then when optimized per LT service, acute rehab, pending PM&R eval

## 2024-10-03 NOTE — PROVIDER CONTACT NOTE (OTHER) - BACKGROUND
Patient is a 59y old  Male who presents with a chief complaint of Acute Liver Failure
Pt s/p MICU, then in [facility], then transferred to . [facility], admitted with acute liver failure and alcohol withdrawal.

## 2024-10-03 NOTE — PROGRESS NOTE ADULT - PROBLEM SELECTOR PLAN 1
Had significantly elevated ammonia to 150s in MICU s/p PLEX and c/f cerebral edema. Now A&Ox1-2. Ammonia controlled    - stool counts, goal 2-3 BM  - c/w lactulose 30 q8h  - c/w miralax  - c/w Rifaximin  - c/w Seroquel 25 BID for agitation 2/2 Alc use disorder and MASLD, as well as prior ischemic episode. While there is no radiographic evidence of cirrhosis, concern is for progression of chronic liver disease to decompensated liver disease/cirrhosis vs. late onset alcoholic hepatitis. W/ ascites and hepatic encephalopathy  - s/p NAC in MICU  - management of ascites and HE as above  - appreciate transplant hepatology recs  - Discussed w/ transplant hep fellow and attending, to be transferred to liver transplant service today, accepting physician Dr. Rasheed Gandhi

## 2024-10-03 NOTE — PROVIDER CONTACT NOTE (OTHER) - SITUATION
pt RUE swelling, pain, unable to move without restrictions
12pm medications due, patient NPO for endoscopy. patient s/p IR for paracentesis this AM

## 2024-10-03 NOTE — PROGRESS NOTE ADULT - PROBLEM SELECTOR PLAN 5
2/2 Alc use disorder and MASLD, as well as prior ischemic episode. Progressed from compensated cirrhosis to decompensated this admission. Decompensated due to ascites and hepatic encephalopathy  - s/p NAC in MICU  - appreciate hepatology recs  - management of ascites and HE as above RUE hematoma since 9/25 2/2 removal of R axillary a-line in MICU. Large heterogenous fluid collection noted on CT RUE w/ compression of neurovascular bundle  -hold a/c  -c/w elevation and warm compresses  - ortho and vascular recs appreciated  - given significant perioperative risk and limited benefit, shared decision making between surgical team and patient/family led to nonoperative management

## 2024-10-03 NOTE — PROGRESS NOTE ADULT - PROBLEM SELECTOR PLAN 2
Multifactorial - distended loops of bowel and ascites  - patient is not clinically obstructed as he is having BMs, would defer NGT for decompression  - management of ascites as below Had significantly elevated ammonia to 150s in MICU s/p PLEX and c/f cerebral edema. Now A&Ox1-2. Ammonia controlled    - stool counts, goal 2-3 BM  - c/w lactulose 30 q8h  - c/w miralax  - c/w Rifaximin  - c/w Seroquel 25 BID for agitation

## 2024-10-03 NOTE — PROGRESS NOTE ADULT - ATTENDING COMMENTS
58 y/o male, PMH ETOH abuse, gastritis, PUD, hx of hypoxic respiratory failure requiring intubation due to aspiration PNA (most recent intubation Aril 2020), presented to [facility] on 9/13 c/o etoh w/d, abd pain, N/V. LFTs began increasing, started on NAC gtt. Pt was transferred to [facility] on 9/15 for evaluation of acute liver failure by Hepatology.     Initially patient was admitted to MICU, and placed on NAC for liver injury. He is unlikely a candidate for LT due to multiple admissions in past for alcohol withdrawal. Pt was intubated on 9/17, extubated 9/23. Pt received PLEX x3, mannitol, hypertonic saline for hyperammonemia and cerebral edema. Pt also given lactulose, ammonia down to 42. Mental status improved, transferred to floor. Seroquel started in MICU for delirium, now titrating off.     He developed a hematoma in RUE, possibly a complication from an axillary line. He is having neurologic compromise with R wrist drop, which is likely due to a radial nerve injury. CT showed involvement of the neurovascular bundle around the axilla. Ortho was consulted initially but deferred to vascular surgery. Vascular surgery offered evacuation of hematoma, however, the family declined. OT consulted as he will need splinting of the R wrist. IR consulted for evaluation; hematomas unable to be percutaneously drained.     Distended abdomen is now improved, having bowel movements. Continue lactulose and Miralax. CT showed ileus vs. partial SBO. Continue diet as tolerated for now, monitoring bowel function.     S/p diagnostic paracentesis 10/1 without evidence of SBP. Given worsening MELD, may benefit from steroids if infection is ruled out. F/u infectious workup. Hepatology recs appreciated. Initially planned for EGD per hepatology, now deferred. Transfer to hepatology service for LT evaluation.     Discussed with team. Rest as above. Dispo - acute rehab when medically ready for discharge.     The necessity of the time spent during the encounter on this date of service was due to:   - Ordering, reviewing, and interpreting labs, testing, and imaging  - Independently obtaining a review of systems and performing a physical exam  - Reviewing prior hospitalization and where necessary, outpatient records  - Reviewing consultant recommendations/communicating with consultants  - Counselling and educating patient and family regarding interpretation of aforementioned items and plan of care    Time-based billing (NON-critical care). Total minutes spent: 60

## 2024-10-03 NOTE — PROGRESS NOTE ADULT - SUBJECTIVE AND OBJECTIVE BOX
DATE OF SERVICE: 10-03-24 @ 08:13    Patient is a 59y old  Male who presents with a chief complaint of Acute Liver Failure (02 Oct 2024 15:59)      INTERVAL/OVERNIGHT:    SUBJECTIVE:    MEDICATIONS  (STANDING):  chlorhexidine 4% Liquid 1 Application(s) Topical <User Schedule>  dextrose 5%. 1000 milliLiter(s) (50 mL/Hr) IV Continuous <Continuous>  dextrose 5%. 1000 milliLiter(s) (100 mL/Hr) IV Continuous <Continuous>  dextrose 50% Injectable 12.5 Gram(s) IV Push once  dextrose 50% Injectable 25 Gram(s) IV Push once  dextrose 50% Injectable 25 Gram(s) IV Push once  dextrose Oral Gel 15 Gram(s) Oral once  folic acid 1 milliGRAM(s) Oral daily  glucagon  Injectable 1 milliGRAM(s) IntraMuscular once  labetalol 200 milliGRAM(s) Oral three times a day  lactulose Syrup 30 Gram(s) Oral every 8 hours  pantoprazole    Tablet 40 milliGRAM(s) Oral before breakfast  polyethylene glycol 3350 17 Gram(s) Oral daily  QUEtiapine 25 milliGRAM(s) Oral at bedtime  rifAXIMin 550 milliGRAM(s) Oral two times a day  thiamine 100 milliGRAM(s) Oral daily    MEDICATIONS  (PRN):      Vital Signs Last 24 Hrs  T(C): 37.4 (03 Oct 2024 06:00), Max: 37.6 (02 Oct 2024 09:18)  T(F): 99.3 (03 Oct 2024 06:00), Max: 99.3 (03 Oct 2024 06:00)  HR: 74 (03 Oct 2024 06:00) (65 - 101)  BP: 116/63 (03 Oct 2024 06:00) (106/60 - 137/82)  BP(mean): 101 (02 Oct 2024 09:18) (101 - 101)  RR: 18 (03 Oct 2024 06:00) (18 - 18)  SpO2: 98% (03 Oct 2024 06:00) (97% - 98%)    Parameters below as of 03 Oct 2024 06:00  Patient On (Oxygen Delivery Method): room air      CAPILLARY BLOOD GLUCOSE      POCT Blood Glucose.: 115 mg/dL (02 Oct 2024 17:28)  POCT Blood Glucose.: 73 mg/dL (02 Oct 2024 12:15)    I&O's Summary    02 Oct 2024 07:01  -  03 Oct 2024 07:00  --------------------------------------------------------  IN: 0 mL / OUT: 400 mL / NET: -400 mL        PHYSICAL EXAM:  GENERAL: NAD,  HEAD:  Atraumatic, Normocephalic  EYES: anicteric  NECK: Supple, No JVD  CHEST/LUNG: Clear to auscultation bilaterally; No wheeze  HEART: Regular rate and rhythm; No murmurs, rubs, or gallops  ABDOMEN: Soft, Nontender, Nondistended  EXTREMITIES: No b/l LE edema  NEUROLOGY: A&Ox3, non-focal  SKIN: No rashes or lesions    LABS:                        7.3    4.24  )-----------( 212      ( 02 Oct 2024 10:06 )             22.5     10-02    136  |  110[H]  |  9   ----------------------------<  71  3.6   |  16[L]  |  1.08    Ca    8.1[L]      02 Oct 2024 10:06  Phos  2.8     10-02  Mg     1.6     10-02    TPro  6.8  /  Alb  2.3[L]  /  TBili  18.4[H]  /  DBili  x   /  AST  77[H]  /  ALT  64[H]  /  AlkPhos  135[H]  10-01    PT/INR - ( 02 Oct 2024 10:06 )   PT: 23.2 sec;   INR: 2.05 ratio         PTT - ( 01 Oct 2024 11:36 )  PTT:34.6 sec      Urinalysis Basic - ( 02 Oct 2024 10:06 )    Color: x / Appearance: x / SG: x / pH: x  Gluc: 71 mg/dL / Ketone: x  / Bili: x / Urobili: x   Blood: x / Protein: x / Nitrite: x   Leuk Esterase: x / RBC: x / WBC x   Sq Epi: x / Non Sq Epi: x / Bacteria: x        RADIOLOGY & ADDITIONAL TESTS:    Imaging Personally Reviewed:    Consultant(s) Notes Reviewed:      Care Discussed with Consultants/Other Providers:   DATE OF SERVICE: 10-03-24 @ 08:13    Patient is a 59y old  Male who presents with a chief complaint of Acute Liver Failure (02 Oct 2024 15:59)      INTERVAL/OVERNIGHT: NAOE    SUBJECTIVE: Patient feels "bad" as he usually feels. No new complaints. Denies fever, chills, SOB.    MEDICATIONS  (STANDING):  chlorhexidine 4% Liquid 1 Application(s) Topical <User Schedule>  dextrose 5%. 1000 milliLiter(s) (50 mL/Hr) IV Continuous <Continuous>  dextrose 5%. 1000 milliLiter(s) (100 mL/Hr) IV Continuous <Continuous>  dextrose 50% Injectable 12.5 Gram(s) IV Push once  dextrose 50% Injectable 25 Gram(s) IV Push once  dextrose 50% Injectable 25 Gram(s) IV Push once  dextrose Oral Gel 15 Gram(s) Oral once  folic acid 1 milliGRAM(s) Oral daily  glucagon  Injectable 1 milliGRAM(s) IntraMuscular once  labetalol 200 milliGRAM(s) Oral three times a day  lactulose Syrup 30 Gram(s) Oral every 8 hours  pantoprazole    Tablet 40 milliGRAM(s) Oral before breakfast  polyethylene glycol 3350 17 Gram(s) Oral daily  QUEtiapine 25 milliGRAM(s) Oral at bedtime  rifAXIMin 550 milliGRAM(s) Oral two times a day  thiamine 100 milliGRAM(s) Oral daily    MEDICATIONS  (PRN):      Vital Signs Last 24 Hrs  T(C): 37.4 (03 Oct 2024 06:00), Max: 37.6 (02 Oct 2024 09:18)  T(F): 99.3 (03 Oct 2024 06:00), Max: 99.3 (03 Oct 2024 06:00)  HR: 74 (03 Oct 2024 06:00) (65 - 101)  BP: 116/63 (03 Oct 2024 06:00) (106/60 - 137/82)  BP(mean): 101 (02 Oct 2024 09:18) (101 - 101)  RR: 18 (03 Oct 2024 06:00) (18 - 18)  SpO2: 98% (03 Oct 2024 06:00) (97% - 98%)    Parameters below as of 03 Oct 2024 06:00  Patient On (Oxygen Delivery Method): room air      CAPILLARY BLOOD GLUCOSE      POCT Blood Glucose.: 115 mg/dL (02 Oct 2024 17:28)  POCT Blood Glucose.: 73 mg/dL (02 Oct 2024 12:15)    I&O's Summary    02 Oct 2024 07:01  -  03 Oct 2024 07:00  --------------------------------------------------------  IN: 0 mL / OUT: 400 mL / NET: -400 mL        PHYSICAL EXAM:  GENERAL: NAD,  HEAD:  Atraumatic, Normocephalic  EYES: +scleral icterus  NECK: Supple, No JVD  CHEST/LUNG: Clear to auscultation bilaterally; No wheeze  HEART: Regular rate and rhythm; No murmurs, rubs, or gallops  ABDOMEN: Soft, distended, nontender  EXTREMITIES: No b/l LE edema. 1+ b/l UE radial pulses  NEUROLOGY: A&Ox2, +R wrist drop  SKIN: +jaundice    LABS:                        7.3    4.24  )-----------( 212      ( 02 Oct 2024 10:06 )             22.5     10-02    136  |  110[H]  |  9   ----------------------------<  71  3.6   |  16[L]  |  1.08    Ca    8.1[L]      02 Oct 2024 10:06  Phos  2.8     10-02  Mg     1.6     10-02    TPro  6.8  /  Alb  2.3[L]  /  TBili  18.4[H]  /  DBili  x   /  AST  77[H]  /  ALT  64[H]  /  AlkPhos  135[H]  10-01    PT/INR - ( 02 Oct 2024 10:06 )   PT: 23.2 sec;   INR: 2.05 ratio         PTT - ( 01 Oct 2024 11:36 )  PTT:34.6 sec      Urinalysis Basic - ( 02 Oct 2024 10:06 )    Color: x / Appearance: x / SG: x / pH: x  Gluc: 71 mg/dL / Ketone: x  / Bili: x / Urobili: x   Blood: x / Protein: x / Nitrite: x   Leuk Esterase: x / RBC: x / WBC x   Sq Epi: x / Non Sq Epi: x / Bacteria: x        RADIOLOGY & ADDITIONAL TESTS:    Imaging Personally Reviewed:    Consultant(s) Notes Reviewed:      Care Discussed with Consultants/Other Providers:

## 2024-10-03 NOTE — PROGRESS NOTE ADULT - SUBJECTIVE AND OBJECTIVE BOX
Interval Events:   no acute events  patient is feeling well  MELD 29    Hospital Medications:  chlorhexidine 4% Liquid 1 Application(s) Topical <User Schedule>  dextrose 5%. 1000 milliLiter(s) IV Continuous <Continuous>  dextrose 5%. 1000 milliLiter(s) IV Continuous <Continuous>  dextrose 50% Injectable 12.5 Gram(s) IV Push once  dextrose 50% Injectable 25 Gram(s) IV Push once  dextrose 50% Injectable 25 Gram(s) IV Push once  dextrose Oral Gel 15 Gram(s) Oral once  folic acid 1 milliGRAM(s) Oral daily  glucagon  Injectable 1 milliGRAM(s) IntraMuscular once  labetalol 200 milliGRAM(s) Oral three times a day  lactulose Syrup 30 Gram(s) Oral every 8 hours  magnesium sulfate  IVPB 2 Gram(s) IV Intermittent every 2 hours  pantoprazole    Tablet 40 milliGRAM(s) Oral before breakfast  polyethylene glycol 3350 17 Gram(s) Oral daily  QUEtiapine 12.5 milliGRAM(s) Oral at bedtime  rifAXIMin 550 milliGRAM(s) Oral two times a day  thiamine 100 milliGRAM(s) Oral daily    ROS: See above.    PHYSICAL EXAM:   Vital Signs:  Vital Signs Last 24 Hrs  T(C): 36.9 (03 Oct 2024 11:55), Max: 37.4 (03 Oct 2024 06:00)  T(F): 98.4 (03 Oct 2024 11:55), Max: 99.3 (03 Oct 2024 06:00)  HR: 70 (03 Oct 2024 11:55) (65 - 74)  BP: 149/76 (03 Oct 2024 11:55) (116/63 - 149/76)  BP(mean): --  RR: 18 (03 Oct 2024 11:55) (18 - 18)  SpO2: 97% (03 Oct 2024 11:55) (97% - 98%)    Parameters below as of 03 Oct 2024 11:55  Patient On (Oxygen Delivery Method): room air      GENERAL:  NAD, resting comfortably in bed  HEENT:  sclera icteric  RESPIRATORY:  Normal Effort  CARDIAC:  HDS  ABDOMEN:  Soft, non-tender, non-distended  SKIN:  Warm & Dry. jaundice  NEURO:  Alert, conversant, no focal deficit    LABS:                        7.5    4.43  )-----------( 180      ( 03 Oct 2024 08:27 )             22.8     Mean Cell Volume: 80.0 fl (10-03-24 @ 08:27)    10-03    133[L]  |  108  |  9   ----------------------------<  84  3.9   |  15[L]  |  1.03    Ca    8.2[L]      03 Oct 2024 08:27  Phos  2.3     10-03  Mg     1.4     10-03    TPro  7.1  /  Alb  2.2[L]  /  TBili  18.2[H]  /  DBili  x   /  AST  88[H]  /  ALT  54[H]  /  AlkPhos  134[H]  10-03    LIVER FUNCTIONS - ( 03 Oct 2024 08:27 )  Alb: 2.2 g/dL / Pro: 7.1 g/dL / ALK PHOS: 134 U/L / ALT: 54 U/L / AST: 88 U/L / GGT: x           PT/INR - ( 03 Oct 2024 08:27 )   PT: 24.3 sec;   INR: 2.13 ratio         PTT - ( 03 Oct 2024 08:27 )  PTT:36.1 sec

## 2024-10-03 NOTE — PROGRESS NOTE ADULT - PROBLEM SELECTOR PLAN 3
New onset ascites, never tapped prior  - s/p para 10/1/24 w/ SAAG 1.2 consistent with portal hypertension  - 143 PMNs, not consistent w/ SBP Multifactorial - distended loops of bowel and ascites  - patient is not clinically obstructed as he is having BMs, would defer NGT for decompression  - management of ascites as below

## 2024-10-04 LAB
ACANTHOCYTES BLD QL SMEAR: SLIGHT — SIGNIFICANT CHANGE UP
ALBUMIN SERPL ELPH-MCNC: 2.1 G/DL — LOW (ref 3.3–5)
ALP SERPL-CCNC: 140 U/L — HIGH (ref 40–120)
ALT FLD-CCNC: 50 U/L — HIGH (ref 10–45)
AMMONIA BLD-MCNC: 46 UMOL/L — SIGNIFICANT CHANGE UP (ref 11–55)
ANION GAP SERPL CALC-SCNC: 10 MMOL/L — SIGNIFICANT CHANGE UP (ref 5–17)
ANISOCYTOSIS BLD QL: SLIGHT — SIGNIFICANT CHANGE UP
APTT BLD: 37.4 SEC — HIGH (ref 24.5–35.6)
AST SERPL-CCNC: 78 U/L — HIGH (ref 10–40)
BASOPHILS # BLD AUTO: 0.03 K/UL — SIGNIFICANT CHANGE UP (ref 0–0.2)
BASOPHILS NFR BLD AUTO: 0.9 % — SIGNIFICANT CHANGE UP (ref 0–2)
BILIRUB DIRECT SERPL-MCNC: >10 MG/DL — HIGH (ref 0–0.3)
BILIRUB SERPL-MCNC: 18 MG/DL — HIGH (ref 0.2–1.2)
BUN SERPL-MCNC: 9 MG/DL — SIGNIFICANT CHANGE UP (ref 7–23)
CALCIUM SERPL-MCNC: 8.1 MG/DL — LOW (ref 8.4–10.5)
CERULOPLASMIN SERPL-MCNC: 18 MG/DL — SIGNIFICANT CHANGE UP (ref 15–30)
CHLORIDE SERPL-SCNC: 109 MMOL/L — HIGH (ref 96–108)
CHOLEST SERPL-MCNC: 88 MG/DL — SIGNIFICANT CHANGE UP
CMV DNA CSF QL NAA+PROBE: 41 IU/ML — HIGH
CMV DNA SPEC NAA+PROBE-LOG#: 1.62 LOG10IU/ML — HIGH
CMV IGG FLD QL: 4.4 U/ML — HIGH
CMV IGG SERPL-IMP: POSITIVE
CO2 SERPL-SCNC: 16 MMOL/L — LOW (ref 22–31)
CREAT SERPL-MCNC: 1.01 MG/DL — SIGNIFICANT CHANGE UP (ref 0.5–1.3)
EBV DNA SERPL NAA+PROBE-ACNC: SIGNIFICANT CHANGE UP IU/ML
EBV EA AB SER IA-ACNC: <5 U/ML — SIGNIFICANT CHANGE UP
EBV EA AB TITR SER IF: POSITIVE
EBV EA IGG SER-ACNC: NEGATIVE — SIGNIFICANT CHANGE UP
EBV NA IGG SER IA-ACNC: 121 U/ML — HIGH
EBV PATRN SPEC IB-IMP: SIGNIFICANT CHANGE UP
EBV VCA IGG AVIDITY SER QL IA: POSITIVE
EBV VCA IGM SER IA-ACNC: 92.3 U/ML — HIGH
EBV VCA IGM SER IA-ACNC: <10 U/ML — SIGNIFICANT CHANGE UP
EBV VCA IGM TITR FLD: NEGATIVE — SIGNIFICANT CHANGE UP
EBVPCR LOG: SIGNIFICANT CHANGE UP LOG10IU/ML
EGFR: 87 ML/MIN/1.73M2 — SIGNIFICANT CHANGE UP
ELLIPTOCYTES BLD QL SMEAR: SLIGHT — SIGNIFICANT CHANGE UP
EOSINOPHIL # BLD AUTO: 0.13 K/UL — SIGNIFICANT CHANGE UP (ref 0–0.5)
EOSINOPHIL NFR BLD AUTO: 3.5 % — SIGNIFICANT CHANGE UP (ref 0–6)
FERRITIN SERPL-MCNC: 237 NG/ML — SIGNIFICANT CHANGE UP (ref 30–400)
FIBRINOGEN PPP-MCNC: 153 MG/DL — LOW (ref 200–445)
GGT SERPL-CCNC: 109 U/L — HIGH (ref 9–50)
GIANT PLATELETS BLD QL SMEAR: PRESENT — SIGNIFICANT CHANGE UP
GLUCOSE SERPL-MCNC: 75 MG/DL — SIGNIFICANT CHANGE UP (ref 70–99)
HAV IGG SER QL IA: REACTIVE
HBV CORE AB SER-ACNC: SIGNIFICANT CHANGE UP
HBV DNA # SERPL NAA+PROBE: SIGNIFICANT CHANGE UP
HBV DNA SERPL NAA+PROBE-LOG#: SIGNIFICANT CHANGE UP LOGIU/ML
HBV SURFACE AB SER-ACNC: REACTIVE
HBV SURFACE AG SER-ACNC: SIGNIFICANT CHANGE UP
HCT VFR BLD CALC: 22.8 % — LOW (ref 39–50)
HCV AB S/CO SERPL IA: 0.12 S/CO — SIGNIFICANT CHANGE UP
HCV AB SERPL-IMP: SIGNIFICANT CHANGE UP
HDLC SERPL-MCNC: 7 MG/DL — LOW
HGB BLD-MCNC: 7.5 G/DL — LOW (ref 13–17)
HIV 1+2 AB+HIV1 P24 AG SERPL QL IA: SIGNIFICANT CHANGE UP
HSV1 IGG SER-ACNC: >62.2 INDEX — HIGH
HSV1 IGG SER-ACNC: >62.2 INDEX — HIGH
HSV1 IGG SERPL QL IA: POSITIVE
HSV1 IGG SERPL QL IA: POSITIVE
HSV2 IGG FLD-ACNC: 0.19 INDEX — SIGNIFICANT CHANGE UP
HSV2 IGG FLD-ACNC: 0.19 INDEX — SIGNIFICANT CHANGE UP
HSV2 IGG SERPL QL IA: NEGATIVE — SIGNIFICANT CHANGE UP
HSV2 IGG SERPL QL IA: NEGATIVE — SIGNIFICANT CHANGE UP
IGA FLD-MCNC: 809 MG/DL — HIGH (ref 84–499)
IGG FLD-MCNC: 3465 MG/DL — HIGH (ref 610–1660)
IGM SERPL-MCNC: 83 MG/DL — SIGNIFICANT CHANGE UP (ref 35–242)
INR BLD: 2.15 RATIO — HIGH (ref 0.85–1.16)
IRON SATN MFR SERPL: 165 UG/DL — SIGNIFICANT CHANGE UP (ref 45–165)
IRON SATN MFR SERPL: 89 % — HIGH (ref 16–55)
LIPID PNL WITH DIRECT LDL SERPL: 57 MG/DL — SIGNIFICANT CHANGE UP
LYMPHOCYTES # BLD AUTO: 0.55 K/UL — LOW (ref 1–3.3)
LYMPHOCYTES # BLD AUTO: 15.1 % — SIGNIFICANT CHANGE UP (ref 13–44)
MAGNESIUM SERPL-MCNC: 1.8 MG/DL — SIGNIFICANT CHANGE UP (ref 1.6–2.6)
MANUAL SMEAR VERIFICATION: SIGNIFICANT CHANGE UP
MCHC RBC-ENTMCNC: 26.8 PG — LOW (ref 27–34)
MCHC RBC-ENTMCNC: 32.9 GM/DL — SIGNIFICANT CHANGE UP (ref 32–36)
MCV RBC AUTO: 81.4 FL — SIGNIFICANT CHANGE UP (ref 80–100)
MEV IGG SER-ACNC: >300 AU/ML — SIGNIFICANT CHANGE UP
MEV IGG+IGM SER-IMP: POSITIVE — SIGNIFICANT CHANGE UP
MONOCYTES # BLD AUTO: 0.35 K/UL — SIGNIFICANT CHANGE UP (ref 0–0.9)
MONOCYTES NFR BLD AUTO: 9.7 % — SIGNIFICANT CHANGE UP (ref 2–14)
MUV AB SER-ACNC: POSITIVE — SIGNIFICANT CHANGE UP
MUV IGG FLD-ACNC: >300 AU/ML — SIGNIFICANT CHANGE UP
MYELOCYTES NFR BLD: 0.9 % — HIGH (ref 0–0)
NEUTROPHILS # BLD AUTO: 2.54 K/UL — SIGNIFICANT CHANGE UP (ref 1.8–7.4)
NEUTROPHILS NFR BLD AUTO: 69.9 % — SIGNIFICANT CHANGE UP (ref 43–77)
NON HDL CHOLESTEROL: 80 MG/DL — SIGNIFICANT CHANGE UP
PHOSPHATE SERPL-MCNC: 2.3 MG/DL — LOW (ref 2.5–4.5)
PLAT MORPH BLD: ABNORMAL
PLATELET # BLD AUTO: 216 K/UL — SIGNIFICANT CHANGE UP (ref 150–400)
POIKILOCYTOSIS BLD QL AUTO: SIGNIFICANT CHANGE UP
POLYCHROMASIA BLD QL SMEAR: SLIGHT — SIGNIFICANT CHANGE UP
POTASSIUM SERPL-MCNC: 3.5 MMOL/L — SIGNIFICANT CHANGE UP (ref 3.5–5.3)
POTASSIUM SERPL-SCNC: 3.5 MMOL/L — SIGNIFICANT CHANGE UP (ref 3.5–5.3)
PROT SERPL-MCNC: 6.9 G/DL — SIGNIFICANT CHANGE UP (ref 6–8.3)
PROT SERPL-MCNC: 7 G/DL — SIGNIFICANT CHANGE UP (ref 6–8.3)
PROTHROM AB SERPL-ACNC: 24.5 SEC — HIGH (ref 9.9–13.4)
PSA SERPL-MCNC: 0.44 NG/ML — SIGNIFICANT CHANGE UP (ref 0–4)
RBC # BLD: 2.8 M/UL — LOW (ref 4.2–5.8)
RBC # FLD: SIGNIFICANT CHANGE UP (ref 10.3–14.5)
RBC BLD AUTO: ABNORMAL
RUBV IGG SER-ACNC: 28.1 INDEX — SIGNIFICANT CHANGE UP
RUBV IGG SER-IMP: POSITIVE — SIGNIFICANT CHANGE UP
SALICYLATES SERPL-MCNC: <2 MG/DL — LOW (ref 15–30)
SARS-COV-2 IGG+IGM SERPL QL IA: >250 U/ML — HIGH
SARS-COV-2 IGG+IGM SERPL QL IA: POSITIVE
SCHISTOCYTES BLD QL AUTO: SIGNIFICANT CHANGE UP
SODIUM SERPL-SCNC: 135 MMOL/L — SIGNIFICANT CHANGE UP (ref 135–145)
T GONDII IGG SER QL: <3 IU/ML — SIGNIFICANT CHANGE UP
T GONDII IGG SER QL: NEGATIVE — SIGNIFICANT CHANGE UP
T PALLIDUM AB TITR SER: NEGATIVE — SIGNIFICANT CHANGE UP
T4 FREE SERPL-MCNC: 0.9 NG/DL — SIGNIFICANT CHANGE UP (ref 0.9–1.8)
TARGETS BLD QL SMEAR: SIGNIFICANT CHANGE UP
TIBC SERPL-MCNC: 186 UG/DL — LOW (ref 220–430)
TRIGL SERPL-MCNC: 127 MG/DL — SIGNIFICANT CHANGE UP
TSH SERPL-MCNC: 2.88 UIU/ML — SIGNIFICANT CHANGE UP (ref 0.27–4.2)
UIBC SERPL-MCNC: 21 UG/DL — LOW (ref 110–370)
URATE SERPL-MCNC: 4.3 MG/DL — SIGNIFICANT CHANGE UP (ref 3.4–8.8)
VZV IGG FLD QL IA: 10 S/CO — SIGNIFICANT CHANGE UP
VZV IGG FLD QL IA: POSITIVE — SIGNIFICANT CHANGE UP
WBC # BLD: 3.63 K/UL — LOW (ref 3.8–10.5)
WBC # FLD AUTO: 3.63 K/UL — LOW (ref 3.8–10.5)

## 2024-10-04 PROCEDURE — 99223 1ST HOSP IP/OBS HIGH 75: CPT

## 2024-10-04 PROCEDURE — 99222 1ST HOSP IP/OBS MODERATE 55: CPT

## 2024-10-04 RX ORDER — PNEUMOCOCCAL 20-VALENT CONJUGATE VACCINE 2.2; 2.2; 2.2; 2.2; 2.2; 2.2; 2.2; 2.2; 2.2; 2.2; 2.2; 2.2; 2.2; 2.2; 2.2; 2.2; 4.4; 2.2; 2.2; 2.2 UG/.5ML; UG/.5ML; UG/.5ML; UG/.5ML; UG/.5ML; UG/.5ML; UG/.5ML; UG/.5ML; UG/.5ML; UG/.5ML; UG/.5ML; UG/.5ML; UG/.5ML; UG/.5ML; UG/.5ML; UG/.5ML; UG/.5ML; UG/.5ML; UG/.5ML; UG/.5ML
0.5 INJECTION, SUSPENSION INTRAMUSCULAR ONCE
Refills: 0 | Status: COMPLETED | OUTPATIENT
Start: 2024-10-04 | End: 2024-10-04

## 2024-10-04 RX ORDER — ACETAMINOPHEN 325 MG
1000 TABLET ORAL ONCE
Refills: 0 | Status: COMPLETED | OUTPATIENT
Start: 2024-10-04 | End: 2024-10-04

## 2024-10-04 RX ADMIN — LABETALOL HYDROCHLORIDE 200 MILLIGRAM(S): 200 TABLET, FILM COATED ORAL at 06:10

## 2024-10-04 RX ADMIN — PANTOPRAZOLE SODIUM 40 MILLIGRAM(S): 40 TABLET, DELAYED RELEASE ORAL at 06:09

## 2024-10-04 RX ADMIN — CHLORHEXIDINE GLUCONATE ORAL RINSE 1 APPLICATION(S): 1.2 SOLUTION DENTAL at 13:01

## 2024-10-04 RX ADMIN — FOLIC ACID 1 MILLIGRAM(S): 1 TABLET ORAL at 13:00

## 2024-10-04 RX ADMIN — Medication 400 MILLIGRAM(S): at 21:43

## 2024-10-04 RX ADMIN — THIAMINE HYDROCHLORIDE 100 MILLIGRAM(S): 100 INJECTION, SOLUTION INTRAMUSCULAR; INTRAVENOUS at 13:00

## 2024-10-04 RX ADMIN — LABETALOL HYDROCHLORIDE 200 MILLIGRAM(S): 200 TABLET, FILM COATED ORAL at 21:43

## 2024-10-04 RX ADMIN — LACTULOSE 30 GRAM(S): 10 SOLUTION ORAL; RECTAL at 06:10

## 2024-10-04 RX ADMIN — QUETIAPINE FUMARATE 12.5 MILLIGRAM(S): 50 TABLET, FILM COATED ORAL at 21:43

## 2024-10-04 RX ADMIN — Medication 17 GRAM(S): at 13:01

## 2024-10-04 RX ADMIN — PNEUMOCOCCAL 20-VALENT CONJUGATE VACCINE 0.5 MILLILITER(S)
2.2; 2.2; 2.2; 2.2; 2.2; 2.2; 2.2; 2.2; 2.2; 2.2; 2.2; 2.2; 2.2; 2.2; 2.2; 2.2; 4.4; 2.2; 2.2; 2.2 INJECTION, SUSPENSION INTRAMUSCULAR at 21:44

## 2024-10-04 RX ADMIN — Medication 1000 MILLIGRAM(S): at 22:40

## 2024-10-04 NOTE — BH CONSULTATION LIAISON ASSESSMENT NOTE - SUMMARY
59 years old male with PPHx of severe EtOH use d/o with numerous ICU admissions with Phenobarbital use and DT's with PMHx of alcoholic gastritis/esophagitis, PUD, HLD, hx of hypoxic respiratory failure requiring intubation due to aspiration PNA (most recent intubation Aril 2020),  staph PNA, COVID-19 infection Dec 2020 presented with abd pain at the OSH with subsequent transfer to [facility]; for whom psychiatry was consulted for psychiatric clearance for liver transplant. Patient noted to be AAOx3 on exam.    Patient seen by  psychiatry at [place] numerous times in setting of EtOH withdrawal and sequelae from EtOH use disorder ( 9/10/20, 10/20, 11/20, 08/21, 9/22, 7/22, 11/23, 1/24) with last admission indicative of significant behavioral disturbance despite being on phenobarbital and withdrawal treatment over a span of 11 day duration. Patient currently states he is unaware of details about liver disease and denies being informed about transplant related education. Patient minimizes EtOH use being extensive stating he consumes max of one cup or 1/2 cups of liquor daily over 25 years with unclear history of sobriety. However, EMR indicative of him reporting 2-3 bottles of vodka 1-2/day.    Plan:  -SIPAT pending further education, although given extensive history of continued EtOH use despite ICU admissions and access to RPP/treatment services, patient is likely a high risk for relapse. Limited insight into EtOH use even during this hospitalization  -High dose thiamine supplementation, B12/Folate  -Can consider Seroquel 25 mg PO HS if Qtc < 500 for sleep architecture  -Acamaprosate 666mg PO TID, patient not amenable currently; declined need for RPP as well

## 2024-10-04 NOTE — CONSULT NOTE ADULT - ASSESSMENT
59 years old male with h/o chronic ETOH abuse and dependence (1 bottle of Vodka a day) with long standing hx of alcohol withdrawal and DTs with frequent hospital/ICU admissions, alcoholic gastritis/esophagitis, PUD, HLD who presented with abdominal pain and encephalopathy. Intubated 9/17 with findings of cerebral edema and concern for assisted. Now extubated on 9/23 with improvement in mental status s/p Plex x3, mannitol and hypertonic saline.       BCx (9/14, 9/19, 9/30, 10/1) No Growth To Date  UCx (10/1) Negative  Sputum Culture (9/20, 9/22) Normal Respiratory Melanie  MRSA Nasal PCR (9/19, 9/27) + MRSA    Paracentesis (10/1) 293 nucleated cells with 49% PMN    CT A/P (9/30) Diffuse small bowel ileus versus low-grade small bowel obstruction with tapering transition point  at the distal ileum just proximal to the ileocecal junction. Few right lower lobe patchy opacities, may be sequelae of infection versus inflammation.    DVT US RUE (9/24) Superficial thrombus noted in the right basilic vein. 4.2 x 4.3 x 6.4 cm avascular complex collection in the right axillary fossa may epresent a hematoma.    CT RUE (9/30) Heterogeneous nonsimple collection within the soft tissues of the right axilla, which extends into the proximal to mid triceps musculature and may extend into the proximal biceps muscle, with mixed internal attenuation. Collection envelops portions of the right axillary neurovascular bundle. It is unclear if this collection communicates with the glenohumeral joint space    Antibiotic Course:   Ceftriaxone: 9/16 --> 9/20  Zosyn: 9/21 --> 9/27  Vancomycin: 9/20 --> 9/22    #Pre-Liver Transplant Evaluation  COVID19 Victor Hugo Antibody Pending  HAV IgG Pending  HBVs Ab Pending  HBVsAg Negative  HBVc Ab Negative  HCV Ab Pending  HSV 1 IgG Pending  HSV 2 IgG Pending  EBV IgG Pending  CMV IgG Pending  VZV IgG Pending  Measles IgG Pending  Mumps IgG Pending  Rubella IgG Pending  Quantiferon Gold Pending  HIV Ag/Ab by CMIA Negative  Syphilis Screen Pending  Toxoplasma IgG Pending  Strongyloides Ab Pending    #Encounter to Vaccinate Patient  COVID19: Would benefit from COVID19 [id number] Vaccine Dose  Influenza: Will require  Pneumococcal: Would benefit from PCV20  HAV: Pending HAV IgG  HBV: Pending HBVs Ab  MMR: Pending MMR IgG   Varicella: Pending Varicella IgG   Shingles: Will require Shingrix  Tdap: Tdap 1/18/2020   59 years old male with h/o chronic ETOH abuse and dependence (1 bottle of Vodka a day) with long standing hx of alcohol withdrawal and DTs with frequent hospital/ICU admissions, alcoholic gastritis/esophagitis, PUD, HLD who presented with abdominal pain and encephalopathy. Intubated 9/17 with findings of cerebral edema and concern for custodial. Now extubated on 9/23 with improvement in mental status s/p Plex x3, mannitol and hypertonic saline.       BCx (9/14, 9/19, 9/30, 10/1) No Growth To Date  UCx (10/1) Negative  Sputum Culture (9/20, 9/22) Normal Respiratory Melanie  MRSA Nasal PCR (9/19, 9/27) + MRSA    Paracentesis (10/1) 293 nucleated cells with 49% PMN    CT A/P (9/30) Diffuse small bowel ileus versus low-grade small bowel obstruction with tapering transition point  at the distal ileum just proximal to the ileocecal junction. Few right lower lobe patchy opacities, may be sequelae of infection versus inflammation.    DVT US RUE (9/24) Superficial thrombus noted in the right basilic vein. 4.2 x 4.3 x 6.4 cm avascular complex collection in the right axillary fossa may epresent a hematoma.    CT RUE (9/30) Heterogeneous nonsimple collection within the soft tissues of the right axilla, which extends into the proximal to mid triceps musculature and may extend into the proximal biceps muscle, with mixed internal attenuation. Collection envelops portions of the right axillary neurovascular bundle. It is unclear if this collection communicates with the glenohumeral joint space    Antibiotic Course:   Ceftriaxone: 9/16 --> 9/20  Zosyn: 9/21 --> 9/27  Vancomycin: 9/20 --> 9/22    #Pre-Liver Transplant Evaluation, Decompensated Cirrhosis  COVID19 Victor Hugo Antibody Pending  HAV IgG Pending  HBVs Ab Pending  HBVsAg Negative  HBVc Ab Negative  HCV Ab Pending  HSV 1 IgG Pending  HSV 2 IgG Pending  EBV IgG Pending  CMV IgG Pending  VZV IgG Pending  Measles IgG Pending  Mumps IgG Pending  Rubella IgG Pending  Quantiferon Gold Pending  HIV Ag/Ab by CMIA Negative  Syphilis Screen Pending  Toxoplasma IgG Pending  Strongyloides Ab Pending  --Will check Leishmania Ab    #Encounter to Vaccinate Patient  COVID19: Would benefit from COVID19 [id number] Vaccine Dose  Influenza: Will require  Pneumococcal: Would benefit from PCV20, will order for today  HAV: Pending HAV IgG  HBV: Pending HBVs Ab  MMR: Pending MMR IgG   Varicella: Pending Varicella IgG   Shingles: Will require Shingrix  Tdap: Tdap 1/18/2020    Dr. Estella Pink will be covering the patient starting from tomorrow. Please reach out to her for further questions and follow up.     Ramo Jara M.D.  [facility] Division of Infectious Disease  8AM-5PM Monday - Friday: Available on [employer] Teams  After Hours and Holidays (or if no response on [employer] Teams): Please contact the Infectious Diseases Office at [phone]     The above assessment and plan were discussed with Elroy, transplant surgery NP

## 2024-10-04 NOTE — CONSULT NOTE ADULT - SUBJECTIVE AND OBJECTIVE BOX
Patient is a 59y old  Male who presents with a chief complaint of Acute Liver Failure (04 Oct 2024 10:39)    HPI:    59 years old male with h/o chronic ETOH abuse and dependence (1 bottle of Vodka a day) with long standing hx of alcohol withdrawal and DTs with frequent hospital/ICU admissions, alcoholic gastritis/esophagitis, PUD, HLD who presented with abdominal pain and encephalopathy. Intubated 9/17 with findings of cerebral edema and concern for GUNJAN. Now extubated on 9/23 with improvement in mental status s/p Plex x3, mannitol and hypertonic saline.       BCx (9/14, 9/19, 9/30, 10/1) No Growth To Date  UCx (10/1) Negative  Sputum Culture (9/20, 9/22) Normal Respiratory Melanie  MRSA Nasal PCR (9/19, 9/27) + MRSA    Paracentesis (10/1) 293 nucleated cells with 49% PMN    CT A/P (9/30) Diffuse small bowel ileus versus low-grade small bowel obstruction with tapering transition point  at the distal ileum just proximal to the ileocecal junction. Few right lower lobe patchy opacities, may be sequelae of infection versus inflammation.    DVT US RUE (9/24) Superficial thrombus noted in the right basilic vein. 4.2 x 4.3 x 6.4 cm avascular complex collection in the right axillary fossa may epresent a hematoma.    CT RUE (9/30) Heterogeneous nonsimple collection within the soft tissues of the right axilla, which extends into the proximal to mid triceps musculature and may extend into the proximal biceps muscle, with mixed internal attenuation. Collection envelops portions of the right axillary neurovascular bundle. It is unclear if this collection communicates with the glenohumeral joint space    Antibiotic Course:   Ceftriaxone: 9/16 --> 9/20  Zosyn: 9/21 --> 9/27  Vancomycin: 9/20 --> 9/22    prior hospital charts reviewed [  ]  primary team notes reviewed [  ]  other consultant notes reviewed [  ]    PAST MEDICAL & SURGICAL HISTORY:  PUD (peptic ulcer disease)      EtOH dependence      Gastritis      Elevated lipase      No significant past surgical history          Allergies  No Known Allergies    ANTIMICROBIALS (past 90 days)  MEDICATIONS  (STANDING):  cefTRIAXone   IVPB   100 mL/Hr IV Intermittent (09-20-24 @ 14:13)   100 mL/Hr IV Intermittent (09-19-24 @ 12:02)   100 mL/Hr IV Intermittent (09-18-24 @ 11:44)   100 mL/Hr IV Intermittent (09-17-24 @ 11:15)   100 mL/Hr IV Intermittent (09-16-24 @ 11:52)    piperacillin/tazobactam IVPB.   200 mL/Hr IV Intermittent (09-21-24 @ 10:18)    piperacillin/tazobactam IVPB.-   25 mL/Hr IV Intermittent (09-21-24 @ 15:58)    piperacillin/tazobactam IVPB..   25 mL/Hr IV Intermittent (09-27-24 @ 21:44)   25 mL/Hr IV Intermittent (09-27-24 @ 14:14)   25 mL/Hr IV Intermittent (09-27-24 @ 06:26)   25 mL/Hr IV Intermittent (09-26-24 @ 22:30)   25 mL/Hr IV Intermittent (09-26-24 @ 14:55)   25 mL/Hr IV Intermittent (09-26-24 @ 06:13)   25 mL/Hr IV Intermittent (09-25-24 @ 21:28)   25 mL/Hr IV Intermittent (09-25-24 @ 13:45)   25 mL/Hr IV Intermittent (09-25-24 @ 05:23)   25 mL/Hr IV Intermittent (09-24-24 @ 21:43)   25 mL/Hr IV Intermittent (09-24-24 @ 13:06)   25 mL/Hr IV Intermittent (09-24-24 @ 06:18)   25 mL/Hr IV Intermittent (09-23-24 @ 22:01)   25 mL/Hr IV Intermittent (09-23-24 @ 14:55)   25 mL/Hr IV Intermittent (09-23-24 @ 05:41)   25 mL/Hr IV Intermittent (09-22-24 @ 21:03)   25 mL/Hr IV Intermittent (09-22-24 @ 13:49)   25 mL/Hr IV Intermittent (09-22-24 @ 05:16)   25 mL/Hr IV Intermittent (09-21-24 @ 21:18)    rifAXIMin   550 milliGRAM(s) Oral (10-04-24 @ 06:09)   550 milliGRAM(s) Oral (10-03-24 @ 18:07)   550 milliGRAM(s) Oral (10-03-24 @ 07:06)   550 milliGRAM(s) Oral (10-02-24 @ 17:26)   550 milliGRAM(s) Oral (10-02-24 @ 06:25)   550 milliGRAM(s) Oral (10-01-24 @ 18:15)   550 milliGRAM(s) Oral (10-01-24 @ 07:15)   550 milliGRAM(s) Oral (09-30-24 @ 18:58)   550 milliGRAM(s) Oral (09-30-24 @ 06:20)   550 milliGRAM(s) Oral (09-29-24 @ 18:10)   550 milliGRAM(s) Oral (09-29-24 @ 05:55)   550 milliGRAM(s) Oral (09-28-24 @ 17:36)    rifAXIMin   550 milliGRAM(s) Oral (09-28-24 @ 05:58)   550 milliGRAM(s) Oral (09-27-24 @ 18:23)   550 milliGRAM(s) Oral (09-27-24 @ 06:27)   550 milliGRAM(s) Oral (09-26-24 @ 18:04)   550 milliGRAM(s) Oral (09-26-24 @ 06:13)   550 milliGRAM(s) Oral (09-25-24 @ 18:17)   550 milliGRAM(s) Oral (09-25-24 @ 05:23)   550 milliGRAM(s) Oral (09-24-24 @ 17:00)   550 milliGRAM(s) Oral (09-24-24 @ 06:17)   550 milliGRAM(s) Oral (09-23-24 @ 17:09)   550 milliGRAM(s) Oral (09-23-24 @ 05:40)   550 milliGRAM(s) Oral (09-22-24 @ 17:45)   550 milliGRAM(s) Oral (09-22-24 @ 05:17)   550 milliGRAM(s) Oral (09-21-24 @ 18:08)   550 milliGRAM(s) Oral (09-21-24 @ 05:19)   550 milliGRAM(s) Oral (09-20-24 @ 17:28)    vancomycin  IVPB   166.67 mL/Hr IV Intermittent (09-21-24 @ 18:08)   166.67 mL/Hr IV Intermittent (09-21-24 @ 05:18)   166.67 mL/Hr IV Intermittent (09-20-24 @ 17:28)    vancomycin  IVPB   166.67 mL/Hr IV Intermittent (09-22-24 @ 05:17)      ANTIMICROBIALS:    rifAXIMin 550 two times a day    OTHER MEDS: MEDICATIONS  (STANDING):  labetalol 200 three times a day  lactulose Syrup 30 every 8 hours  pantoprazole    Tablet 40 before breakfast  polyethylene glycol 3350 17 daily  QUEtiapine 12.5 at bedtime    SOCIAL HISTORY:   hx smoking  non-smoker    FAMILY HISTORY:  FH: HTN (hypertension)      REVIEW OF SYSTEMS  [  ] ROS unobtainable because:    [  ] All other systems negative except as noted below:	    Constitutional:  [ ] fever [ ] chills  [ ] weight loss  [ ] weakness  Skin:  [ ] rash [ ] phlebitis	  Eyes: [ ] icterus [ ] pain  [ ] discharge	  ENMT: [ ] sore throat  [ ] thrush [ ] ulcers [ ] exudates  Respiratory: [ ] dyspnea [ ] hemoptysis [ ] cough [ ] sputum	  Cardiovascular:  [ ] chest pain [ ] palpitations [ ] edema	  Gastrointestinal:  [ ] nausea [ ] vomiting [ ] diarrhea [ ] constipation [ ] pain	  Genitourinary:  [ ] dysuria [ ] frequency [ ] hematuria [ ] discharge [ ] flank pain  [ ] incontinence  Musculoskeletal:  [ ] myalgias [ ] arthralgias [ ] arthritis  [ ] back pain  Neurological:  [ ] headache [ ] seizures  [ ] confusion/altered mental status  Psychiatric:  [ ] anxiety [ ] depression	  Hematology/Lymphatics:  [ ] lymphadenopathy  Endocrine:  [ ] adrenal [ ] thyroid  Allergic/Immunologic:	 [ ] transplant [ ] seasonal    Vital Signs Last 24 Hrs  T(F): 98.4 (10-04-24 @ 08:27), Max: 99.6 (10-02-24 @ 04:15)  Vital Signs Last 24 Hrs  HR: 61 (10-04-24 @ 08:27) (61 - 70)  BP: 119/74 (10-04-24 @ 08:27) (119/74 - 151/80)  RR: 18 (10-04-24 @ 08:27)  SpO2: 98% (10-04-24 @ 08:27) (98% - 100%)  Wt(kg): --    PHYSICAL EXAMINATION:  General: Alert and Awake, NAD  HEENT: PERRL, EOMI, No subconjunctival hemorrhages, Oropharynx Clear, MMM  Neck: Supple, No GRICEL  Cardiac: RRR, No M/R/G  Resp: CTAB, No Wh/Rh/Ra  Abdomen: NBS, NT/ND, No HSM, No rigidity or guarding  MSK: No LE edema. No stigmata of IE. No evidence of phlebitis. No evidence of synovitis.  : No saxena  Skin: No rashes or lesions. Skin is warm and dry to the touch.   Neuro: Alert and Awake. CN 2-12 Grossly intact. Moves all four extremities spontaneously.  Psych: Calm, Pleasant, Cooperative                          7.5    3.63  )-----------( 216      ( 04 Oct 2024 06:56 )             22.8     10-04    135  |  109[H]  |  9   ----------------------------<  75  3.5   |  16[L]  |  1.01    Ca    8.1[L]      04 Oct 2024 06:55  Phos  2.3     10-04  Mg     1.8     10-04    TPro  6.9  /  Alb  x   /  TBili  x   /  DBili  x   /  AST  x   /  ALT  x   /  AlkPhos  x   10-04    Urinalysis Basic - ( 04 Oct 2024 06:55 )    Color: x / Appearance: x / SG: x / pH: x  Gluc: 75 mg/dL / Ketone: x  / Bili: x / Urobili: x   Blood: x / Protein: x / Nitrite: x   Leuk Esterase: x / RBC: x / WBC x   Sq Epi: x / Non Sq Epi: x / Bacteria: x    MICROBIOLOGY:    Vancomycin Level, Trough: 11.6 (09-21 @ 17:00)    Culture - Fungal, Body Fluid (collected 01 Oct 2024 11:52)  Source: Peritoneal  Preliminary Report (03 Oct 2024 12:46):    No growth    Culture - Body Fluid with Gram Stain (collected 01 Oct 2024 11:52)  Source: Ascites Fl  Gram Stain (01 Oct 2024 19:37):    No polymorphonuclear leukocytes seen per low power field    No organisms seen per oil power field  Preliminary Report (02 Oct 2024 14:28):    No growth to date.    Culture - Blood (collected 01 Oct 2024 10:00)  Source: .Blood BLOOD  Preliminary Report (03 Oct 2024 17:01):    No growth at 48 Hours    Culture - Urine (collected 01 Oct 2024 09:54)  Source: Clean Catch Clean Catch (Midstream)  Final Report (02 Oct 2024 14:14):    <10,000 CFU/mL Normal Urogenital Melanie    Culture - Blood (collected 30 Sep 2024 16:08)  Source: .Blood BLOOD  Preliminary Report (03 Oct 2024 20:00):    No growth at 72 Hours    RADIOLOGY:    <The imaging below has been reviewed and visualized by me independently. Findings as detailed in report below>    < from: CT Upper Extremity w/ IV Cont, Right (09.30.24 @ 18:57) >  IMPRESSION:    Heterogeneous nonsimple collection within the soft tissues of the right   axilla, which extends into the proximal to mid triceps musculature and   may extend into the proximalbiceps muscle, with mixed internal   attenuation. Findings may be related to a hematoma within the soft   tissues. Superimposed infection of this hematoma cannot be excluded.   Compartment syndrome is a clinical diagnosis. Correlate clinically for   signs of compartment syndrome. Additionally, recommend short-term   interval follow-up CT or MRI of the right shoulder/humerus in 8 weeks to   demonstrate resolution of this collection and to rule out an underlying   mass/neoplasm.    Collection envelops portions of the right axillary neurovascular bundle.   It is unclear if this collection communicates with the glenohumeral joint   space. If there is clinical concern for septic arthritis of the right   glenohumeral joint space, right glenohumeraljoint aspiration should be   performed.    Nonspecific subcutaneous edema about the right upper extremity, which may   be related to upper extremity edema and/or cellulitis.    Partially imaged abdominal ascites. Please see separate same day   abdomen/pelvis CT dictation for findings within the abdomen/pelvis.    < end of copied text >       Patient is a 59y old  Male who presents with a chief complaint of Acute Liver Failure (04 Oct 2024 10:39)    HPI:    59 years old male with h/o chronic ETOH abuse and dependence (1 bottle of Vodka a day) with long standing hx of alcohol withdrawal and DTs with frequent hospital/ICU admissions, alcoholic gastritis/esophagitis, PUD, HLD who presented with abdominal pain and encephalopathy. Intubated 9/17 with findings of cerebral edema and concern for GUNJAN. Now extubated on 9/23 with improvement in mental status s/p Plex x3, mannitol and hypertonic saline.       BCx (9/14, 9/19, 9/30, 10/1) No Growth To Date  UCx (10/1) Negative  Sputum Culture (9/20, 9/22) Normal Respiratory Melanie  MRSA Nasal PCR (9/19, 9/27) + MRSA    Paracentesis (10/1) 293 nucleated cells with 49% PMN    CT A/P (9/30) Diffuse small bowel ileus versus low-grade small bowel obstruction with tapering transition point  at the distal ileum just proximal to the ileocecal junction. Few right lower lobe patchy opacities, may be sequelae of infection versus inflammation.    DVT US RUE (9/24) Superficial thrombus noted in the right basilic vein. 4.2 x 4.3 x 6.4 cm avascular complex collection in the right axillary fossa may epresent a hematoma.    CT RUE (9/30) Heterogeneous nonsimple collection within the soft tissues of the right axilla, which extends into the proximal to mid triceps musculature and may extend into the proximal biceps muscle, with mixed internal attenuation. Collection envelops portions of the right axillary neurovascular bundle. It is unclear if this collection communicates with the glenohumeral joint space    Antibiotic Course:   Ceftriaxone: 9/16 --> 9/20  Zosyn: 9/21 --> 9/27  Vancomycin: 9/20 --> 9/22    prior hospital charts reviewed [  ]  primary team notes reviewed [ x ]  other consultant notes reviewed [ x ]    PAST MEDICAL & SURGICAL HISTORY:  PUD (peptic ulcer disease)      EtOH dependence      Gastritis      Elevated lipase      No significant past surgical history          Allergies  No Known Allergies    ANTIMICROBIALS (past 90 days)  MEDICATIONS  (STANDING):  cefTRIAXone   IVPB   100 mL/Hr IV Intermittent (09-20-24 @ 14:13)   100 mL/Hr IV Intermittent (09-19-24 @ 12:02)   100 mL/Hr IV Intermittent (09-18-24 @ 11:44)   100 mL/Hr IV Intermittent (09-17-24 @ 11:15)   100 mL/Hr IV Intermittent (09-16-24 @ 11:52)    piperacillin/tazobactam IVPB.   200 mL/Hr IV Intermittent (09-21-24 @ 10:18)    piperacillin/tazobactam IVPB.-   25 mL/Hr IV Intermittent (09-21-24 @ 15:58)    piperacillin/tazobactam IVPB..   25 mL/Hr IV Intermittent (09-27-24 @ 21:44)   25 mL/Hr IV Intermittent (09-27-24 @ 14:14)   25 mL/Hr IV Intermittent (09-27-24 @ 06:26)   25 mL/Hr IV Intermittent (09-26-24 @ 22:30)   25 mL/Hr IV Intermittent (09-26-24 @ 14:55)   25 mL/Hr IV Intermittent (09-26-24 @ 06:13)   25 mL/Hr IV Intermittent (09-25-24 @ 21:28)   25 mL/Hr IV Intermittent (09-25-24 @ 13:45)   25 mL/Hr IV Intermittent (09-25-24 @ 05:23)   25 mL/Hr IV Intermittent (09-24-24 @ 21:43)   25 mL/Hr IV Intermittent (09-24-24 @ 13:06)   25 mL/Hr IV Intermittent (09-24-24 @ 06:18)   25 mL/Hr IV Intermittent (09-23-24 @ 22:01)   25 mL/Hr IV Intermittent (09-23-24 @ 14:55)   25 mL/Hr IV Intermittent (09-23-24 @ 05:41)   25 mL/Hr IV Intermittent (09-22-24 @ 21:03)   25 mL/Hr IV Intermittent (09-22-24 @ 13:49)   25 mL/Hr IV Intermittent (09-22-24 @ 05:16)   25 mL/Hr IV Intermittent (09-21-24 @ 21:18)    rifAXIMin   550 milliGRAM(s) Oral (10-04-24 @ 06:09)   550 milliGRAM(s) Oral (10-03-24 @ 18:07)   550 milliGRAM(s) Oral (10-03-24 @ 07:06)   550 milliGRAM(s) Oral (10-02-24 @ 17:26)   550 milliGRAM(s) Oral (10-02-24 @ 06:25)   550 milliGRAM(s) Oral (10-01-24 @ 18:15)   550 milliGRAM(s) Oral (10-01-24 @ 07:15)   550 milliGRAM(s) Oral (09-30-24 @ 18:58)   550 milliGRAM(s) Oral (09-30-24 @ 06:20)   550 milliGRAM(s) Oral (09-29-24 @ 18:10)   550 milliGRAM(s) Oral (09-29-24 @ 05:55)   550 milliGRAM(s) Oral (09-28-24 @ 17:36)    rifAXIMin   550 milliGRAM(s) Oral (09-28-24 @ 05:58)   550 milliGRAM(s) Oral (09-27-24 @ 18:23)   550 milliGRAM(s) Oral (09-27-24 @ 06:27)   550 milliGRAM(s) Oral (09-26-24 @ 18:04)   550 milliGRAM(s) Oral (09-26-24 @ 06:13)   550 milliGRAM(s) Oral (09-25-24 @ 18:17)   550 milliGRAM(s) Oral (09-25-24 @ 05:23)   550 milliGRAM(s) Oral (09-24-24 @ 17:00)   550 milliGRAM(s) Oral (09-24-24 @ 06:17)   550 milliGRAM(s) Oral (09-23-24 @ 17:09)   550 milliGRAM(s) Oral (09-23-24 @ 05:40)   550 milliGRAM(s) Oral (09-22-24 @ 17:45)   550 milliGRAM(s) Oral (09-22-24 @ 05:17)   550 milliGRAM(s) Oral (09-21-24 @ 18:08)   550 milliGRAM(s) Oral (09-21-24 @ 05:19)   550 milliGRAM(s) Oral (09-20-24 @ 17:28)    vancomycin  IVPB   166.67 mL/Hr IV Intermittent (09-21-24 @ 18:08)   166.67 mL/Hr IV Intermittent (09-21-24 @ 05:18)   166.67 mL/Hr IV Intermittent (09-20-24 @ 17:28)    vancomycin  IVPB   166.67 mL/Hr IV Intermittent (09-22-24 @ 05:17)      ANTIMICROBIALS:    rifAXIMin 550 two times a day    OTHER MEDS: MEDICATIONS  (STANDING):  labetalol 200 three times a day  lactulose Syrup 30 every 8 hours  pantoprazole    Tablet 40 before breakfast  polyethylene glycol 3350 17 daily  QUEtiapine 12.5 at bedtime    SOCIAL HISTORY: [place].  Has had travel back and forth and last travel was 4 years ago.  Has lived primarily in [place].  This is a travel to [place] of the past.  States that he was held detection center in the remote past for several months.  Notes prior latent tuberculosis testing was negative.  No pets.  No livestock exposure.    FAMILY HISTORY:  FH: HTN (hypertension)      REVIEW OF SYSTEMS  [  ] ROS unobtainable because:    [ x ] All other systems negative except as noted below:	    Constitutional:  [ ] fever [ ] chills  [ ] weight loss  [ ] weakness  Skin:  [ ] rash [ ] phlebitis	  Eyes: [ ] icterus [ ] pain  [ ] discharge	  ENMT: [ ] sore throat  [ ] thrush [ ] ulcers [ ] exudates  Respiratory: [ ] dyspnea [ ] hemoptysis [ ] cough [ ] sputum	  Cardiovascular:  [ ] chest pain [ ] palpitations [ ] edema	  Gastrointestinal:  [ ] nausea [ ] vomiting [ ] diarrhea [ ] constipation [ ] pain	  Genitourinary:  [ ] dysuria [ ] frequency [ ] hematuria [ ] discharge [ ] flank pain  [ ] incontinence  Musculoskeletal:  [ ] myalgias [ ] arthralgias [ ] arthritis  [ ] back pain  Neurological:  [ ] headache [ ] seizures  [ ] confusion/altered mental status  Psychiatric:  [ ] anxiety [ ] depression	  Hematology/Lymphatics:  [ ] lymphadenopathy  Endocrine:  [ ] adrenal [ ] thyroid  Allergic/Immunologic:	 [ ] transplant [ ] seasonal    Vital Signs Last 24 Hrs  T(F): 98.4 (10-04-24 @ 08:27), Max: 99.6 (10-02-24 @ 04:15)  Vital Signs Last 24 Hrs  HR: 61 (10-04-24 @ 08:27) (61 - 70)  BP: 119/74 (10-04-24 @ 08:27) (119/74 - 151/80)  RR: 18 (10-04-24 @ 08:27)  SpO2: 98% (10-04-24 @ 08:27) (98% - 100%)  Wt(kg): --    PHYSICAL EXAMINATION:  General: Alert and Awake, NAD  HEENT: PERRL, EOMI, No subconjunctival hemorrhages, Oropharynx Clear, MMM  Neck: Supple, No GRICEL  Cardiac: RRR, No M/R/G  Resp: CTAB, No Wh/Rh/Ra  Abdomen: NBS, NT/ND, No HSM, No rigidity or guarding  MSK: Edema of the RUE with limited ROM.  : No saxena  Skin: No rashes or lesions. Skin is warm and dry to the touch.   Neuro: Alert and Awake. CN 2-12 Grossly intact. Moves all four extremities spontaneously.  Psych: Calm, Pleasant, Cooperative                          7.5    3.63  )-----------( 216      ( 04 Oct 2024 06:56 )             22.8     10-04    135  |  109[H]  |  9   ----------------------------<  75  3.5   |  16[L]  |  1.01    Ca    8.1[L]      04 Oct 2024 06:55  Phos  2.3     10-04  Mg     1.8     10-04    TPro  6.9  /  Alb  x   /  TBili  x   /  DBili  x   /  AST  x   /  ALT  x   /  AlkPhos  x   10-04    Urinalysis Basic - ( 04 Oct 2024 06:55 )    Color: x / Appearance: x / SG: x / pH: x  Gluc: 75 mg/dL / Ketone: x  / Bili: x / Urobili: x   Blood: x / Protein: x / Nitrite: x   Leuk Esterase: x / RBC: x / WBC x   Sq Epi: x / Non Sq Epi: x / Bacteria: x    MICROBIOLOGY:    Vancomycin Level, Trough: 11.6 (09-21 @ 17:00)    Culture - Fungal, Body Fluid (collected 01 Oct 2024 11:52)  Source: Peritoneal  Preliminary Report (03 Oct 2024 12:46):    No growth    Culture - Body Fluid with Gram Stain (collected 01 Oct 2024 11:52)  Source: Ascites Fl  Gram Stain (01 Oct 2024 19:37):    No polymorphonuclear leukocytes seen per low power field    No organisms seen per oil power field  Preliminary Report (02 Oct 2024 14:28):    No growth to date.    Culture - Blood (collected 01 Oct 2024 10:00)  Source: .Blood BLOOD  Preliminary Report (03 Oct 2024 17:01):    No growth at 48 Hours    Culture - Urine (collected 01 Oct 2024 09:54)  Source: Clean Catch Clean Catch (Midstream)  Final Report (02 Oct 2024 14:14):    <10,000 CFU/mL Normal Urogenital Melanie    Culture - Blood (collected 30 Sep 2024 16:08)  Source: .Blood BLOOD  Preliminary Report (03 Oct 2024 20:00):    No growth at 72 Hours    RADIOLOGY:    <The imaging below has been reviewed and visualized by me independently. Findings as detailed in report below>    < from: CT Upper Extremity w/ IV Cont, Right (09.30.24 @ 18:57) >  IMPRESSION:    Heterogeneous nonsimple collection within the soft tissues of the right   axilla, which extends into the proximal to mid triceps musculature and   may extend into the proximalbiceps muscle, with mixed internal   attenuation. Findings may be related to a hematoma within the soft   tissues. Superimposed infection of this hematoma cannot be excluded.   Compartment syndrome is a clinical diagnosis. Correlate clinically for   signs of compartment syndrome. Additionally, recommend short-term   interval follow-up CT or MRI of the right shoulder/humerus in 8 weeks to   demonstrate resolution of this collection and to rule out an underlying   mass/neoplasm.    Collection envelops portions of the right axillary neurovascular bundle.   It is unclear if this collection communicates with the glenohumeral joint   space. If there is clinical concern for septic arthritis of the right   glenohumeral joint space, right glenohumeraljoint aspiration should be   performed.    Nonspecific subcutaneous edema about the right upper extremity, which may   be related to upper extremity edema and/or cellulitis.    Partially imaged abdominal ascites. Please see separate same day   abdomen/pelvis CT dictation for findings within the abdomen/pelvis.    < end of copied text >

## 2024-10-04 NOTE — PROGRESS NOTE ADULT - ASSESSMENT
59 years old male with h/o chronic ETOH abuse and dependence (1 bottle of Vodka a day) with long standing hx of alcohol withdrawal and DTs with frequent hospital/ICU admissions, alcoholic gastritis/esophagitis, PUD, HLD, hx of hypoxic respiratory failure requiring intubation due to aspiration PNA (most recent intubation Aril 2020),  staph PNA, COVID-19 infection Dec 2020 presented with abd pain at the OSH.    #Acute alcohol associated hepatitis and cirrhosis   #Alcohol-related liver disease  #Hepatic steatosis  Patient with multiple hospitalizations in the past for alcohol withdrawal and DT per records. Unclear last alcohol drink. he was admitted with AMS and intubated with findings of cerebral edema and concern for long-term. Now extubated on 9/23 with improvement in mental status s/p Plex x3, mannitol and hypertonic saline.    High MDF score of 49  - Calculated MELD 3.0 score 34 >>29  - Ascites: s/p paracentesis 10/1 with 800cc fluid removal, SAAG > 1.1, Total protein 2.5, negative for SBP  - Varices: no EGD on file  - HE: see above, now improved  - HCC: CT without lesion (not triple phase study)    Recommendations:   - OLT evaluation initiated   - Rifaximin 550 mg BID, lactulose to goal 3-4 BMs per day.   - Trend CBC, CMP & PT/INR daily  - Low sodium diet  - Thiamine, FA, MV supplementation  - Alcohol cessation counseling  - Psych, social work evals  - PT/OT/Nutrition     Mayur Castillo MD  Gastroenterology/Hepatology Fellow  Available via [employer] Teams  Pager: [phone]    On Weekdays after 5 PM to 8AM and Weekends/Holidays (All day)  For non-urgent consults, please email [email or web address]  For urgent consults, please contact on call GI team.  See Amion schedule ([facility]), [place] system - [phone] ([facility] or call hospital  ([facility])

## 2024-10-04 NOTE — PROGRESS NOTE ADULT - SUBJECTIVE AND OBJECTIVE BOX
Interval Events:   no acute events  transferred to transplant service for OLT evaluation which was opened  evaluated by Psychiatry  clinically stable, more alert and conversant    Hospital Medications:  chlorhexidine 2% Cloths 1 Application(s) Topical daily  folic acid 1 milliGRAM(s) Oral daily  labetalol 200 milliGRAM(s) Oral three times a day  lactulose Syrup 30 Gram(s) Oral every 8 hours  pantoprazole    Tablet 40 milliGRAM(s) Oral before breakfast  polyethylene glycol 3350 17 Gram(s) Oral daily  QUEtiapine 12.5 milliGRAM(s) Oral at bedtime  rifAXIMin 550 milliGRAM(s) Oral two times a day  thiamine 100 milliGRAM(s) Oral daily    ROS: See above.    PHYSICAL EXAM:   Vital Signs:  Vital Signs Last 24 Hrs  T(C): 36.9 (04 Oct 2024 08:27), Max: 37.4 (04 Oct 2024 01:03)  T(F): 98.4 (04 Oct 2024 08:27), Max: 99.4 (04 Oct 2024 01:03)  HR: 61 (04 Oct 2024 08:27) (61 - 70)  BP: 119/74 (04 Oct 2024 08:27) (119/74 - 151/80)  BP(mean): --  RR: 18 (04 Oct 2024 08:27) (16 - 18)  SpO2: 98% (04 Oct 2024 08:27) (97% - 100%)    Parameters below as of 04 Oct 2024 08:27  Patient On (Oxygen Delivery Method): room air    GENERAL:  NAD, resting comfortably in bed  HEENT:  sclera icteric  RESPIRATORY:  Normal Effort  CARDIAC:  HDS  ABDOMEN:  Soft, non-tender, non-distended  SKIN:  Warm & Dry. Jaundice  NEURO:  Alert, conversant, no focal deficit    LABS:                        7.5    3.63  )-----------( 216      ( 04 Oct 2024 06:56 )             22.8     Mean Cell Volume: 81.4 fl (10-04-24 @ 06:56)    10-04    135  |  109[H]  |  9   ----------------------------<  75  3.5   |  16[L]  |  1.01    Ca    8.1[L]      04 Oct 2024 06:55  Phos  2.3     10-04  Mg     1.8     10-04    TPro  7.0  /  Alb  2.1[L]  /  TBili  18.0[H]  /  DBili  >10.0[H]  /  AST  78[H]  /  ALT  50[H]  /  AlkPhos  140[H]  10-04    LIVER FUNCTIONS - ( 04 Oct 2024 06:55 )  Alb: 2.1 g/dL / Pro: 7.0 g/dL / ALK PHOS: 140 U/L / ALT: 50 U/L / AST: 78 U/L / GGT: x           PT/INR - ( 04 Oct 2024 06:55 )   PT: 24.5 sec;   INR: 2.15 ratio         PTT - ( 04 Oct 2024 06:55 )  PTT:37.4 sec  Amylase Serum--      Lipase serum--       [id number]

## 2024-10-04 NOTE — BH CONSULTATION LIAISON ASSESSMENT NOTE - NSBHCHARTREVIEWVS_PSY_A_CORE FT
Vital Signs Last 24 Hrs  T(C): 36.9 (04 Oct 2024 08:27), Max: 37.4 (04 Oct 2024 01:03)  T(F): 98.4 (04 Oct 2024 08:27), Max: 99.4 (04 Oct 2024 01:03)  HR: 61 (04 Oct 2024 08:27) (61 - 70)  BP: 119/74 (04 Oct 2024 08:27) (119/74 - 151/80)  BP(mean): --  RR: 18 (04 Oct 2024 08:27) (16 - 18)  SpO2: 98% (04 Oct 2024 08:27) (97% - 100%)    Parameters below as of 04 Oct 2024 08:27  Patient On (Oxygen Delivery Method): room air

## 2024-10-04 NOTE — CHART NOTE - NSCHARTNOTEFT_GEN_A_CORE
NUTRITION FOLLOW UP NOTE    PATIENT SEEN FOR: consult - nutrition assessment (liver transplant eval)     SOURCE: [x] Patient  [x] Current Medical Record  [] RN  [] Family/support person at bedside  [] Patient unavailable/inappropriate  [] Other:    CHART REVIEWED/EVENTS NOTED.  [x] Nutrition Status:  -ETOH cirrhosis   -Extubated   -S/p para --> 800cc out (10/1)     LIVER TRANSPLANT EVAL:   -Pt resides with Spouse; able to assist with meal preparation post-transplant   -BMI: 24 - normal  -HbA1c: 5.5% - no hx of DM detected  -Frailty: [pending PT evaluation]   -MELD: 29     Education:    -Reviewed post-transplant nutrition therapy and food safety guidelines for transplant recipients   -Reviewed recommendations to avoid grapefruit, pomegranate and star fruit while taking immunosuppressant medication.    -Reviewed recommendations for moderate intake of sodium and carbohydrates with transplant medications.      Pt was receptive and expressed understanding.     Pt with no known nutrition contraindication to transplant.     Nutrition assessment communicated with Transplant Hepatology Team and outpatient Transplant RDs     DIET ORDER:   Diet, DASH/TLC:   Sodium & Cholesterol Restricted (24)      CURRENT DIET ORDER IS:  [] Appropriate:  [] Inadequate:  [] Other:    NUTRITION INTAKE/PROVISION:  [x] PO:  -Endorses good PO intake of meals since advanced   -Per flowsheet: % intake of meals     ANTHROPOMETRICS:  Drug Dosing Weight  Height (cm): 182.9 (02 Oct 2024 09:18)  Weight (kg): 83 (02 Oct 2024 09:18)  BMI (kg/m2): 24.8 (02 Oct 2024 09:18)  Daily Weight in k.2 (10-04), Weight in k.2 (10-02)     NUTRITIONALLY PERTINENT MEDICATIONS:  MEDICATIONS  (STANDING):  dextrose 5%.  dextrose 5%.  dextrose 50% Injectable  dextrose 50% Injectable  dextrose 50% Injectable  dextrose Oral Gel  folic acid  glucagon  Injectable  labetalol  lactulose Syrup  pantoprazole    Tablet  polyethylene glycol 3350  rifAXIMin  thiamine       NUTRITIONALLY PERTINENT LABS:  10-04 Na135 mmol/L Glu 75 mg/dL K+ 3.5 mmol/L Cr  1.01 mg/dL BUN 9 mg/dL 10-04 Phos 2.3 mg/dL[L] 10-04 Alb 2.1 g/dL[L]10-04 ALT 50 U/L[H] AST 78 U/L[H] Alkaline Phosphatase 140 U/L[H]  24 @ 11:30 a1c 5.5    A1C with Estimated Average Glucose Result: 5.5 % (24 @ 11:30)  A1C with Estimated Average Glucose Result: 5.4 % (24 @ 07:05)      NUTRITIONALLY PERTINENT MEDICATIONS/LABS:  [x] Reviewed  [x] Relevant notes on medications/labs:  -Hyponatremic, hypomagnesemic, hypophosphatemic     EDEMA:  [x] Reviewed  [x] Relevant notes:  -2+ L/R foot, ankle, knee, leg, ankle, wrist, hand   -1+ generalized     GI/ I&O:  [x] Reviewed  [] Relevant notes:  [] Other:    SKIN:   [x] No pressure injuries documented, per nursing flowsheet    ESTIMATED NEEDS:  [x] change: 83kg   Energy:  2241-2656kcal/day (27-32 kcal/kg)  Protein:   100-124g/day (1.2-1.5 g/kg)  Fluid:   ml/day or [x] defer to team  Based on: 83kg     NUTRITION DIAGNOSIS:  [x] Prior Dx  1) Increased protein-energy needs  2) Inadequate protein-energy intake - improving     EDUCATION:  [x] Yes: Provided brief introduction to education on post transplant nutrition therapy and food safety guidelines for transplant recipients. Discussed needs to follow food safety guidelines with medications, increased needs for proper post-surgical healing, and moderate intake of sodium and carbohydrates; pt made aware detailed education with written material will be provided post-transplant. Pt amenable - denies having questions/concerns about diet and nutrition at this time; made aware RD remains available.     NUTRITION CARE PLAN:  1. Diet: regular, low sodium   2. Supplements: ensure max x1/day   3. Multivitamin/mineral supplementation: multivitamin, folic acid, thiamine     MONITORING AND EVALUATION:   RD remains available upon request and will follow up per protocol.    Macey Huizar, MS, RDN, CDN (Teams)   Available on [place] TEAMS

## 2024-10-04 NOTE — BH CONSULTATION LIAISON ASSESSMENT NOTE - NSBHCHARTREVIEWINVESTIGATE_PSY_A_CORE FT
Ventricular Rate 84 BPM    Atrial Rate 84 BPM    P-R Interval 130 ms    QRS Duration 80 ms    Q-T Interval 398 ms    QTC Calculation(Bazett) 470 ms    P Axis 34 degrees    R Axis 2 degrees    T Axis 14 degrees    Diagnosis Line NORMAL SINUS RHYTHM  NONSPECIFIC T WAVE ABNORMALITY  PROLONGED QT  ABNORMAL ECG  NO PREVIOUS ECGS AVAILABLE  Confirmed by MD SAQIB, DANIEL ([id number]) on 9/26/2024 6:18:29 PM

## 2024-10-04 NOTE — BH CONSULTATION LIAISON ASSESSMENT NOTE - HPI (INCLUDE ILLNESS QUALITY, SEVERITY, DURATION, TIMING, CONTEXT, MODIFYING FACTORS, ASSOCIATED SIGNS AND SYMPTOMS)
59 years old male with PPHx of severe EtOH use d/o with numerous ICU admissions with Phenobarbital use and DT's with PMHx of alcoholic gastritis/esophagitis, PUD, HLD, hx of hypoxic respiratory failure requiring intubation due to aspiration PNA (most recent intubation Aril 2020),  staph PNA, COVID-19 infection Dec 2020 presented with abd pain at the OSH with subsequent transfer to [facility]; for whom psychiatry was consulted for psychiatric clearance for liver transplant. Patient noted to be AAOx3 on exam.    Patient seen by  psychiatry at [facility] numerous times in setting of EtOH withdrawal and sequelae from EtOH use disorder ( 9/10/20, 10/20, 11/20, 08/21, 9/22, 7/22, 11/23, 1/24) with last admission indicative of significant behavioral disturbance despite being on phenobarbital and withdrawal treatment over a span of 11 day duration. Patient currently states he is unaware of details about liver disease and denies being informed about transplant related education. Patient minimizes EtOH use being extensive stating he consumes max of one cup or 1/2 cups of liquor daily over 25 years with unclear history of sobriety. However, EMR indicative of him reporting 2-3 bottles of vodka 1-2/day.Patient initially denied having any EtOH related hospitalizations and or being offered RPP. Denies EtOH use being concerning and is minimally aware of liver complications being secondary to EtOH use. 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 Denies any overt periods of hopelessness, helplessness, worthlessness or anhedonia contributing to ongoing EtOH use. Denies SI/HI/AVH with no plan or intent of self-harm. Perseverates on decline in health soley being due to a "[place] 3:00AM admission." States doctors "didn't tell me nothing." Alludes to possible use of "16 tylenol" prior to his last hospitalization more as a method to alleviate generalized pain. Patient identifies his wife and two sons as caregivers.

## 2024-10-04 NOTE — BH CONSULTATION LIAISON ASSESSMENT NOTE - CURRENT MEDICATION
MEDICATIONS  (STANDING):  chlorhexidine 2% Cloths 1 Application(s) Topical daily  folic acid 1 milliGRAM(s) Oral daily  labetalol 200 milliGRAM(s) Oral three times a day  lactulose Syrup 30 Gram(s) Oral every 8 hours  pantoprazole    Tablet 40 milliGRAM(s) Oral before breakfast  polyethylene glycol 3350 17 Gram(s) Oral daily  QUEtiapine 12.5 milliGRAM(s) Oral at bedtime  rifAXIMin 550 milliGRAM(s) Oral two times a day  thiamine 100 milliGRAM(s) Oral daily    MEDICATIONS  (PRN):

## 2024-10-04 NOTE — BH CONSULTATION LIAISON ASSESSMENT NOTE - NSBHCHARTREVIEWLAB_PSY_A_CORE FT
7.5    3.63  )-----------( 216      ( 04 Oct 2024 06:56 )             22.8     10-04    135  |  109[H]  |  9   ----------------------------<  75  3.5   |  16[L]  |  1.01    Ca    8.1[L]      04 Oct 2024 06:55  Phos  2.3     10-04  Mg     1.8     10-04    TPro  6.9  /  Alb  x   /  TBili  x   /  DBili  x   /  AST  x   /  ALT  x   /  AlkPhos  x   10-04

## 2024-10-05 LAB
ALBUMIN SERPL ELPH-MCNC: 2.4 G/DL — LOW (ref 3.3–5)
ALP SERPL-CCNC: 157 U/L — HIGH (ref 40–120)
ALT FLD-CCNC: 47 U/L — HIGH (ref 10–45)
ANION GAP SERPL CALC-SCNC: 10 MMOL/L — SIGNIFICANT CHANGE UP (ref 5–17)
APTT BLD: 35.9 SEC — HIGH (ref 24.5–35.6)
AST SERPL-CCNC: 77 U/L — HIGH (ref 10–40)
BASOPHILS # BLD AUTO: 0.03 K/UL — SIGNIFICANT CHANGE UP (ref 0–0.2)
BASOPHILS NFR BLD AUTO: 0.9 % — SIGNIFICANT CHANGE UP (ref 0–2)
BILIRUB SERPL-MCNC: 18.8 MG/DL — HIGH (ref 0.2–1.2)
BUN SERPL-MCNC: 9 MG/DL — SIGNIFICANT CHANGE UP (ref 7–23)
CALCIUM SERPL-MCNC: 8.3 MG/DL — LOW (ref 8.4–10.5)
CHLORIDE SERPL-SCNC: 106 MMOL/L — SIGNIFICANT CHANGE UP (ref 96–108)
CO2 SERPL-SCNC: 17 MMOL/L — LOW (ref 22–31)
CREAT SERPL-MCNC: 1.07 MG/DL — SIGNIFICANT CHANGE UP (ref 0.5–1.3)
CULTURE RESULTS: SIGNIFICANT CHANGE UP
EGFR: 81 ML/MIN/1.73M2 — SIGNIFICANT CHANGE UP
EOSINOPHIL # BLD AUTO: 0.35 K/UL — SIGNIFICANT CHANGE UP (ref 0–0.5)
EOSINOPHIL NFR BLD AUTO: 9.5 % — HIGH (ref 0–6)
GIANT PLATELETS BLD QL SMEAR: PRESENT — SIGNIFICANT CHANGE UP
GLUCOSE BLDC GLUCOMTR-MCNC: 110 MG/DL — HIGH (ref 70–99)
GLUCOSE SERPL-MCNC: 87 MG/DL — SIGNIFICANT CHANGE UP (ref 70–99)
HCT VFR BLD CALC: 24.4 % — LOW (ref 39–50)
HCV RNA FLD QL NAA+PROBE: SIGNIFICANT CHANGE UP
HCV RNA SPEC QL PROBE+SIG AMP: SIGNIFICANT CHANGE UP
HGB BLD-MCNC: 7.9 G/DL — LOW (ref 13–17)
INR BLD: 2.18 RATIO — HIGH (ref 0.85–1.16)
LKM AB SER-ACNC: <20.1 UNITS — SIGNIFICANT CHANGE UP (ref 0–20)
LYMPHOCYTES # BLD AUTO: 0.7 K/UL — LOW (ref 1–3.3)
LYMPHOCYTES # BLD AUTO: 19.1 % — SIGNIFICANT CHANGE UP (ref 13–44)
MAGNESIUM SERPL-MCNC: 1.5 MG/DL — LOW (ref 1.6–2.6)
MANUAL SMEAR VERIFICATION: SIGNIFICANT CHANGE UP
MCHC RBC-ENTMCNC: 26.5 PG — LOW (ref 27–34)
MCHC RBC-ENTMCNC: 32.4 GM/DL — SIGNIFICANT CHANGE UP (ref 32–36)
MCV RBC AUTO: 81.9 FL — SIGNIFICANT CHANGE UP (ref 80–100)
MONOCYTES # BLD AUTO: 0.35 K/UL — SIGNIFICANT CHANGE UP (ref 0–0.9)
MONOCYTES NFR BLD AUTO: 9.6 % — SIGNIFICANT CHANGE UP (ref 2–14)
NEUTROPHILS # BLD AUTO: 2.23 K/UL — SIGNIFICANT CHANGE UP (ref 1.8–7.4)
NEUTROPHILS NFR BLD AUTO: 60.9 % — SIGNIFICANT CHANGE UP (ref 43–77)
NON-GYNECOLOGICAL CYTOLOGY STUDY: SIGNIFICANT CHANGE UP
PHOSPHATE SERPL-MCNC: 2.3 MG/DL — LOW (ref 2.5–4.5)
PLAT MORPH BLD: NORMAL — SIGNIFICANT CHANGE UP
PLATELET # BLD AUTO: 236 K/UL — SIGNIFICANT CHANGE UP (ref 150–400)
POTASSIUM SERPL-MCNC: 3.7 MMOL/L — SIGNIFICANT CHANGE UP (ref 3.5–5.3)
POTASSIUM SERPL-SCNC: 3.7 MMOL/L — SIGNIFICANT CHANGE UP (ref 3.5–5.3)
PROT SERPL-MCNC: 7.8 G/DL — SIGNIFICANT CHANGE UP (ref 6–8.3)
PROTHROM AB SERPL-ACNC: 24.9 SEC — HIGH (ref 9.9–13.4)
RBC # BLD: 2.98 M/UL — LOW (ref 4.2–5.8)
RBC # FLD: SIGNIFICANT CHANGE UP (ref 10.3–14.5)
RBC BLD AUTO: SIGNIFICANT CHANGE UP
SODIUM SERPL-SCNC: 133 MMOL/L — LOW (ref 135–145)
SPECIMEN SOURCE: SIGNIFICANT CHANGE UP
WBC # BLD: 3.66 K/UL — LOW (ref 3.8–10.5)
WBC # FLD AUTO: 3.66 K/UL — LOW (ref 3.8–10.5)

## 2024-10-05 RX ORDER — DIPHENHYDRAMINE HCL 12.5MG/5ML
25 LIQUID (ML) ORAL ONCE
Refills: 0 | Status: COMPLETED | OUTPATIENT
Start: 2024-10-05 | End: 2024-10-05

## 2024-10-05 RX ORDER — MAGNESIUM SULFATE 500 MG/ML
1 VIAL (ML) INJECTION ONCE
Refills: 0 | Status: COMPLETED | OUTPATIENT
Start: 2024-10-05 | End: 2024-10-05

## 2024-10-05 RX ORDER — PSYLLIUM HUSK 0.4 G
400 CAPSULE ORAL
Refills: 0 | Status: DISCONTINUED | OUTPATIENT
Start: 2024-10-05 | End: 2024-10-11

## 2024-10-05 RX ORDER — CHOLESTYRAMINE 4 G/9G
4 POWDER, FOR SUSPENSION ORAL DAILY
Refills: 0 | Status: DISCONTINUED | OUTPATIENT
Start: 2024-10-05 | End: 2024-10-05

## 2024-10-05 RX ORDER — ANTI-ITCH CREAM 1 G/100G
1 OINTMENT TOPICAL
Refills: 0 | Status: DISCONTINUED | OUTPATIENT
Start: 2024-10-05 | End: 2024-10-05

## 2024-10-05 RX ORDER — ANTI-ITCH CREAM 1 G/100G
1 OINTMENT TOPICAL
Refills: 0 | Status: COMPLETED | OUTPATIENT
Start: 2024-10-05 | End: 2024-10-08

## 2024-10-05 RX ORDER — 5-HYDROXYTRYPTOPHAN (5-HTP) 100 MG
3 TABLET,DISINTEGRATING ORAL ONCE
Refills: 0 | Status: COMPLETED | OUTPATIENT
Start: 2024-10-05 | End: 2024-10-05

## 2024-10-05 RX ORDER — TRAMADOL HYDROCHLORIDE 50 MG/1
25 TABLET, COATED ORAL ONCE
Refills: 0 | Status: DISCONTINUED | OUTPATIENT
Start: 2024-10-05 | End: 2024-10-05

## 2024-10-05 RX ADMIN — TRAMADOL HYDROCHLORIDE 25 MILLIGRAM(S): 50 TABLET, COATED ORAL at 23:09

## 2024-10-05 RX ADMIN — Medication 100 GRAM(S): at 14:50

## 2024-10-05 RX ADMIN — LABETALOL HYDROCHLORIDE 200 MILLIGRAM(S): 200 TABLET, FILM COATED ORAL at 14:50

## 2024-10-05 RX ADMIN — Medication 400 MILLIGRAM(S): at 17:08

## 2024-10-05 RX ADMIN — Medication 25 MILLIGRAM(S): at 18:32

## 2024-10-05 RX ADMIN — LACTULOSE 30 GRAM(S): 10 SOLUTION ORAL; RECTAL at 05:28

## 2024-10-05 RX ADMIN — FOLIC ACID 1 MILLIGRAM(S): 1 TABLET ORAL at 12:07

## 2024-10-05 RX ADMIN — LABETALOL HYDROCHLORIDE 200 MILLIGRAM(S): 200 TABLET, FILM COATED ORAL at 21:33

## 2024-10-05 RX ADMIN — PANTOPRAZOLE SODIUM 40 MILLIGRAM(S): 40 TABLET, DELAYED RELEASE ORAL at 05:28

## 2024-10-05 RX ADMIN — Medication 3 MILLIGRAM(S): at 22:39

## 2024-10-05 RX ADMIN — Medication 25 MILLIGRAM(S): at 12:07

## 2024-10-05 RX ADMIN — CHLORHEXIDINE GLUCONATE ORAL RINSE 1 APPLICATION(S): 1.2 SOLUTION DENTAL at 12:09

## 2024-10-05 RX ADMIN — TRAMADOL HYDROCHLORIDE 25 MILLIGRAM(S): 50 TABLET, COATED ORAL at 22:39

## 2024-10-05 RX ADMIN — LABETALOL HYDROCHLORIDE 200 MILLIGRAM(S): 200 TABLET, FILM COATED ORAL at 05:27

## 2024-10-05 RX ADMIN — QUETIAPINE FUMARATE 12.5 MILLIGRAM(S): 50 TABLET, FILM COATED ORAL at 21:32

## 2024-10-05 RX ADMIN — THIAMINE HYDROCHLORIDE 100 MILLIGRAM(S): 100 INJECTION, SOLUTION INTRAMUSCULAR; INTRAVENOUS at 12:07

## 2024-10-05 NOTE — PROGRESS NOTE ADULT - ASSESSMENT
59 years old male with h/o chronic ETOH abuse and dependence (1 bottle of Vodka a day) with long standing hx of alcohol withdrawal and DTs with frequent hospital/ICU admissions, alcoholic gastritis/esophagitis, PUD, HLD, hx of hypoxic respiratory failure requiring intubation due to aspiration PNA (most recent intubation Aril 2020),  staph PNA, COVID-19 infection Dec 2020 presented with abd pain at the OSH.    #Acute alcohol associated hepatitis and cirrhosis   #Alcohol-related liver disease  #Hepatic steatosis  Patient with multiple hospitalizations in the past for alcohol withdrawal and DT per records. Unclear last alcohol drink. he was admitted with AMS and intubated with findings of cerebral edema and concern for half-way. Now extubated on 9/23 with improvement in mental status s/p Plex x3, mannitol and hypertonic saline.    High MDF score of 49  - Calculated MELD 3.0 score 34 >>29  - Ascites: s/p paracentesis 10/1 with 800cc fluid removal, SAAG > 1.1, Total protein 2.5, negative for SBP  - Varices: no EGD on file  - HE: see above, now improved  - HCC: CT without lesion (not triple phase study)    Recommendations:   - OLT evaluation initiated - will need re-discuss as patient has poor insight regarding his medical condition. Unable to specify the cause of his liver disease and reported he does not have a drinking problem.   - Rifaximin 550 mg BID, lactulose to goal 3-4 BMs per day.   - Trend CBC, CMP & PT/INR daily  - Low sodium diet  - Thiamine, FA, MV supplementation  - Alcohol cessation counseling  - Psych, social work evals  - PT/OT/Nutrition     Discussed the case with Dr. Munroe.     All recommendations are tentative until note is attested by an attending.     Edvin Garsia, PGY-6  Gastroenterology/Hepatology Fellow  Available on [employer] Teams  [phone] (Short Range Pager)  [phone] (Long Range Pager)    After 5pm, please contact the on-call GI fellow.

## 2024-10-05 NOTE — PROGRESS NOTE ADULT - SUBJECTIVE AND OBJECTIVE BOX
Gastroenterology/Hepatology Progress Note      Interval Events:     - No acute events overnight.   - Patient complained of itching in his chest after taking a shower yesterday. Appeared to be frustrated. Denied abd pain, nausea and vomiting. tolerating po diet well. Afebrile.     Allergies:  No Known Allergies      Hospital Medications:  chlorhexidine 2% Cloths 1 Application(s) Topical daily  folic acid 1 milliGRAM(s) Oral daily  labetalol 200 milliGRAM(s) Oral three times a day  lactulose Syrup 30 Gram(s) Oral every 8 hours  magnesium oxide 400 milliGRAM(s) Oral two times a day with meals  pantoprazole    Tablet 40 milliGRAM(s) Oral before breakfast  polyethylene glycol 3350 17 Gram(s) Oral daily  QUEtiapine 12.5 milliGRAM(s) Oral at bedtime  rifAXIMin 550 milliGRAM(s) Oral two times a day  thiamine 100 milliGRAM(s) Oral daily      ROS: 14 point ROS negative unless otherwise state in subjective    PHYSICAL EXAM:   Vital Signs:  Vital Signs Last 24 Hrs  T(C): 36.7 (05 Oct 2024 13:00), Max: 36.9 (05 Oct 2024 00:28)  T(F): 98.1 (05 Oct 2024 13:00), Max: 98.4 (05 Oct 2024 00:28)  HR: 68 (05 Oct 2024 13:00) (62 - 71)  BP: 130/81 (05 Oct 2024 13:00) (103/59 - 147/82)  BP(mean): --  RR: 18 (05 Oct 2024 13:00) (16 - 18)  SpO2: 97% (05 Oct 2024 13:00) (97% - 99%)    Parameters below as of 05 Oct 2024 13:00  Patient On (Oxygen Delivery Method): room air      Daily     Daily Weight in k.5 (05 Oct 2024 05:20)    GENERAL:  NAD, resting comfortably in bed  HEENT:  sclera icteric  RESPIRATORY:  Normal Effort  CARDIAC:  HDS  ABDOMEN:  Soft, non-tender, non-distended  SKIN:  Warm & Dry. Jaundice  NEURO:  Alert, conversant, no focal deficit    LABS:                        7.9    3.66  )-----------( 236      ( 05 Oct 2024 09:15 )             24.4     Mean Cell Volume: 81.9 fl (10-05-24 @ 09:15)    10-05    133[L]  |  106  |  9   ----------------------------<  87  3.7   |  17[L]  |  1.07    Ca    8.3[L]      05 Oct 2024 09:15  Phos  2.3     10-05  Mg     1.5     10-05    TPro  7.8  /  Alb  2.4[L]  /  TBili  18.8[H]  /  DBili  x   /  AST  77[H]  /  ALT  47[H]  /  AlkPhos  157[H]  10-05    LIVER FUNCTIONS - ( 05 Oct 2024 09:15 )  Alb: 2.4 g/dL / Pro: 7.8 g/dL / ALK PHOS: 157 U/L / ALT: 47 U/L / AST: 77 U/L / GGT: x           PT/INR - ( 05 Oct 2024 09:15 )   PT: 24.9 sec;   INR: 2.18 ratio         PTT - ( 05 Oct 2024 09:15 )  PTT:35.9 sec  Urinalysis Basic - ( 05 Oct 2024 09:15 )    Color: x / Appearance: x / SG: x / pH: x  Gluc: 87 mg/dL / Ketone: x  / Bili: x / Urobili: x   Blood: x / Protein: x / Nitrite: x   Leuk Esterase: x / RBC: x / WBC x   Sq Epi: x / Non Sq Epi: x / Bacteria: x

## 2024-10-06 LAB
ALBUMIN SERPL ELPH-MCNC: 2.1 G/DL — LOW (ref 3.3–5)
ALP SERPL-CCNC: 146 U/L — HIGH (ref 40–120)
ALT FLD-CCNC: 42 U/L — SIGNIFICANT CHANGE UP (ref 10–45)
ANION GAP SERPL CALC-SCNC: 11 MMOL/L — SIGNIFICANT CHANGE UP (ref 5–17)
APTT BLD: 36.7 SEC — HIGH (ref 24.5–35.6)
AST SERPL-CCNC: 81 U/L — HIGH (ref 10–40)
BILIRUB SERPL-MCNC: 17 MG/DL — HIGH (ref 0.2–1.2)
BLD GP AB SCN SERPL QL: NEGATIVE — SIGNIFICANT CHANGE UP
BUN SERPL-MCNC: 10 MG/DL — SIGNIFICANT CHANGE UP (ref 7–23)
CALCIUM SERPL-MCNC: 8.3 MG/DL — LOW (ref 8.4–10.5)
CHLORIDE SERPL-SCNC: 107 MMOL/L — SIGNIFICANT CHANGE UP (ref 96–108)
CO2 SERPL-SCNC: 13 MMOL/L — LOW (ref 22–31)
CREAT SERPL-MCNC: 0.9 MG/DL — SIGNIFICANT CHANGE UP (ref 0.5–1.3)
CULTURE RESULTS: SIGNIFICANT CHANGE UP
CULTURE RESULTS: SIGNIFICANT CHANGE UP
EGFR: 100 ML/MIN/1.73M2 — SIGNIFICANT CHANGE UP
GLUCOSE SERPL-MCNC: 70 MG/DL — SIGNIFICANT CHANGE UP (ref 70–99)
HEV IGM SER QL: NEGATIVE — SIGNIFICANT CHANGE UP
INR BLD: 2.1 RATIO — HIGH (ref 0.85–1.16)
MAGNESIUM SERPL-MCNC: 1.6 MG/DL — SIGNIFICANT CHANGE UP (ref 1.6–2.6)
PHOSPHATE SERPL-MCNC: 2.7 MG/DL — SIGNIFICANT CHANGE UP (ref 2.5–4.5)
POTASSIUM SERPL-MCNC: 4.2 MMOL/L — SIGNIFICANT CHANGE UP (ref 3.5–5.3)
POTASSIUM SERPL-SCNC: 4.2 MMOL/L — SIGNIFICANT CHANGE UP (ref 3.5–5.3)
PROT SERPL-MCNC: 7.9 G/DL — SIGNIFICANT CHANGE UP (ref 6–8.3)
PROTHROM AB SERPL-ACNC: 24 SEC — HIGH (ref 9.9–13.4)
RH IG SCN BLD-IMP: POSITIVE — SIGNIFICANT CHANGE UP
SODIUM SERPL-SCNC: 131 MMOL/L — LOW (ref 135–145)
SPECIMEN SOURCE: SIGNIFICANT CHANGE UP
SPECIMEN SOURCE: SIGNIFICANT CHANGE UP

## 2024-10-06 RX ORDER — CHOLESTYRAMINE 4 G/9G
4 POWDER, FOR SUSPENSION ORAL
Refills: 0 | Status: ACTIVE | OUTPATIENT
Start: 2024-10-06 | End: 2025-09-04

## 2024-10-06 RX ORDER — HYDROXYZINE PAMOATE 50 MG
10 CAPSULE ORAL EVERY 8 HOURS
Refills: 0 | Status: DISCONTINUED | OUTPATIENT
Start: 2024-10-06 | End: 2024-10-07

## 2024-10-06 RX ORDER — DIPHENHYDRAMINE HCL 12.5MG/5ML
25 LIQUID (ML) ORAL ONCE
Refills: 0 | Status: COMPLETED | OUTPATIENT
Start: 2024-10-06 | End: 2024-10-06

## 2024-10-06 RX ADMIN — CHOLESTYRAMINE 4 GRAM(S): 4 POWDER, FOR SUSPENSION ORAL at 21:03

## 2024-10-06 RX ADMIN — FOLIC ACID 1 MILLIGRAM(S): 1 TABLET ORAL at 11:24

## 2024-10-06 RX ADMIN — Medication 10 MILLIGRAM(S): at 13:28

## 2024-10-06 RX ADMIN — Medication 400 MILLIGRAM(S): at 08:36

## 2024-10-06 RX ADMIN — QUETIAPINE FUMARATE 12.5 MILLIGRAM(S): 50 TABLET, FILM COATED ORAL at 21:03

## 2024-10-06 RX ADMIN — CHLORHEXIDINE GLUCONATE ORAL RINSE 1 APPLICATION(S): 1.2 SOLUTION DENTAL at 11:24

## 2024-10-06 RX ADMIN — ANTI-ITCH CREAM 1 APPLICATION(S): 1 OINTMENT TOPICAL at 05:54

## 2024-10-06 RX ADMIN — LABETALOL HYDROCHLORIDE 200 MILLIGRAM(S): 200 TABLET, FILM COATED ORAL at 13:28

## 2024-10-06 RX ADMIN — LABETALOL HYDROCHLORIDE 200 MILLIGRAM(S): 200 TABLET, FILM COATED ORAL at 21:03

## 2024-10-06 RX ADMIN — LACTULOSE 30 GRAM(S): 10 SOLUTION ORAL; RECTAL at 05:54

## 2024-10-06 RX ADMIN — PANTOPRAZOLE SODIUM 40 MILLIGRAM(S): 40 TABLET, DELAYED RELEASE ORAL at 05:53

## 2024-10-06 RX ADMIN — ANTI-ITCH CREAM 1 APPLICATION(S): 1 OINTMENT TOPICAL at 18:07

## 2024-10-06 RX ADMIN — Medication 25 MILLIGRAM(S): at 00:12

## 2024-10-06 RX ADMIN — LACTULOSE 30 GRAM(S): 10 SOLUTION ORAL; RECTAL at 13:29

## 2024-10-06 RX ADMIN — THIAMINE HYDROCHLORIDE 100 MILLIGRAM(S): 100 INJECTION, SOLUTION INTRAMUSCULAR; INTRAVENOUS at 11:24

## 2024-10-06 RX ADMIN — Medication 400 MILLIGRAM(S): at 18:07

## 2024-10-06 RX ADMIN — LABETALOL HYDROCHLORIDE 200 MILLIGRAM(S): 200 TABLET, FILM COATED ORAL at 05:53

## 2024-10-06 NOTE — PROGRESS NOTE ADULT - ASSESSMENT
Impression:   59 years old male with h/o chronic ETOH abuse and dependence (1 bottle of Vodka a day) with long standing hx of alcohol withdrawal and DTs with frequent hospital/ICU admissions, alcoholic gastritis/esophagitis, PUD, HLD, hx of hypoxic respiratory failure requiring intubation due to aspiration PNA (most recent intubation Aril 2020),  staph PNA, COVID-19 infection Dec 2020 presented with abd pain at the OSH.    #Acute alcohol associated hepatitis and cirrhosis   #Alcohol-related liver disease  #Hepatic steatosis  Patient with multiple hospitalizations in the past for alcohol withdrawal and DT per records. Unclear last alcohol drink. he was admitted with AMS and intubated with findings of cerebral edema and concern for GUNJAN. Now extubated on 9/23 with improvement in mental status s/p Plex x3, mannitol and hypertonic saline.    High MDF score of 49  - Calculated MELD 3.0 score 34 >>29  - Ascites: s/p paracentesis 10/1 with 800cc fluid removal, SAAG > 1.1, Total protein 2.5, negative for SBP  - Varices: no EGD on file  - HE: see above, now improved  - HCC: CT without lesion (not triple phase study)    Recommendations:   - OLT evaluation initiated - will need re-discuss as patient has poor insight regarding his medical condition. Unable to specify the cause of his liver disease and reported he does not have a drinking problem.   - Started him on hydroxyzine 10 mg TID as needed for itching.   - Will also start him on Cholestyramine 4 grams BID.   - Rifaximin 550 mg BID, lactulose to goal 3-4 BMs per day.   - Trend CBC, CMP & PT/INR daily  - Low sodium diet  - Thiamine, FA, MV supplementation  - Alcohol cessation counseling  - Psych, social work evals  - PT/OT/Nutrition     Discussed the case with Dr. Munroe.     All recommendations are tentative until note is attested by an attending.     Edvin Garsia, PGY-6  Gastroenterology/Hepatology Fellow  Available on [employer] Teams  [phone] (Short Range Pager)  [phone] (Long Range Pager)    After 5pm, please contact the on-call GI fellow.

## 2024-10-06 NOTE — PROGRESS NOTE ADULT - SUBJECTIVE AND OBJECTIVE BOX
Gastroenterology/Hepatology Progress Note      Interval Events:     - No acute events overnight.   -  Complained of persistent itching throughout his body. He has excoriations in his chest.   - Denied abd pain, nausea and vomiting.   - No fevers or chills.   - tolerating po diet well.   - Patient has poor insight regarding his medical condition.     Allergies:  No Known Allergies      Hospital Medications:  chlorhexidine 2% Cloths 1 Application(s) Topical daily  cholestyramine Powder (Sugar-Free) 4 Gram(s) Oral two times a day  folic acid 1 milliGRAM(s) Oral daily  hydrocortisone 2.5% Lotion 1 Application(s) Topical two times a day  hydrOXYzine hydrochloride 10 milliGRAM(s) Oral every 8 hours PRN  labetalol 200 milliGRAM(s) Oral three times a day  lactulose Syrup 30 Gram(s) Oral every 8 hours  magnesium oxide 400 milliGRAM(s) Oral two times a day with meals  pantoprazole    Tablet 40 milliGRAM(s) Oral before breakfast  polyethylene glycol 3350 17 Gram(s) Oral daily  QUEtiapine 12.5 milliGRAM(s) Oral at bedtime  rifAXIMin 550 milliGRAM(s) Oral two times a day  thiamine 100 milliGRAM(s) Oral daily      ROS: 14 point ROS negative unless otherwise state in subjective    PHYSICAL EXAM:   Vital Signs:  Vital Signs Last 24 Hrs  T(C): 36.6 (06 Oct 2024 13:00), Max: 36.9 (05 Oct 2024 21:20)  T(F): 97.9 (06 Oct 2024 13:00), Max: 98.5 (05 Oct 2024 21:20)  HR: 60 (06 Oct 2024 13:00) (60 - 74)  BP: 136/76 (06 Oct 2024 13:00) (116/70 - 143/76)  BP(mean): --  RR: 18 (06 Oct 2024 13:00) (18 - 18)  SpO2: 99% (06 Oct 2024 13:00) (98% - 99%)    Parameters below as of 06 Oct 2024 13:00  Patient On (Oxygen Delivery Method): room air      Daily     Daily Weight in k.3 (06 Oct 2024 06:26)    GENERAL:  NAD, resting comfortably in bed  HEENT:  sclera icteric  RESPIRATORY:  Normal Effort  CARDIAC:  HDS  ABDOMEN:  Soft, non-tender, non-distended  SKIN:  Warm & Dry. Jaundice  NEURO:  Alert, conversant, no focal deficit    LABS:                        7.9    3.66  )-----------( 236      ( 05 Oct 2024 09:15 )             24.4       10    131[L]  |  107  |  10  ----------------------------<  70  4.2   |  13[L]  |  0.90    Ca    8.3[L]      06 Oct 2024 06:35  Phos  2.7     10-06  Mg     1.6     10-06    TPro  7.9  /  Alb  2.1[L]  /  TBili  17.0[H]  /  DBili  x   /  AST  81[H]  /  ALT  42  /  AlkPhos  146[H]  10-06    LIVER FUNCTIONS - ( 06 Oct 2024 06:35 )  Alb: 2.1 g/dL / Pro: 7.9 g/dL / ALK PHOS: 146 U/L / ALT: 42 U/L / AST: 81 U/L / GGT: x           PT/INR - ( 06 Oct 2024 06:35 )   PT: 24.0 sec;   INR: 2.10 ratio         PTT - ( 06 Oct 2024 06:35 )  PTT:36.7 sec  Urinalysis Basic - ( 06 Oct 2024 06:35 )    Color: x / Appearance: x / SG: x / pH: x  Gluc: 70 mg/dL / Ketone: x  / Bili: x / Urobili: x   Blood: x / Protein: x / Nitrite: x   Leuk Esterase: x / RBC: x / WBC x   Sq Epi: x / Non Sq Epi: x / Bacteria: x

## 2024-10-07 LAB
ACANTHOCYTES BLD QL SMEAR: SLIGHT — SIGNIFICANT CHANGE UP
ALBUMIN SERPL ELPH-MCNC: 2.3 G/DL — LOW (ref 3.3–5)
ALP SERPL-CCNC: 146 U/L — HIGH (ref 40–120)
ALT FLD-CCNC: 42 U/L — SIGNIFICANT CHANGE UP (ref 10–45)
ANION GAP SERPL CALC-SCNC: 9 MMOL/L — SIGNIFICANT CHANGE UP (ref 5–17)
ANISOCYTOSIS BLD QL: SLIGHT — SIGNIFICANT CHANGE UP
APTT BLD: 35.5 SEC — SIGNIFICANT CHANGE UP (ref 24.5–35.6)
AST SERPL-CCNC: 76 U/L — HIGH (ref 10–40)
BASOPHILS # BLD AUTO: 0.03 K/UL — SIGNIFICANT CHANGE UP (ref 0–0.2)
BASOPHILS NFR BLD AUTO: 0.9 % — SIGNIFICANT CHANGE UP (ref 0–2)
BILIRUB SERPL-MCNC: 16.1 MG/DL — HIGH (ref 0.2–1.2)
BUN SERPL-MCNC: 11 MG/DL — SIGNIFICANT CHANGE UP (ref 7–23)
BURR CELLS BLD QL SMEAR: PRESENT — SIGNIFICANT CHANGE UP
BURR CELLS BLD QL SMEAR: SLIGHT — SIGNIFICANT CHANGE UP
CALCIUM SERPL-MCNC: 8.6 MG/DL — SIGNIFICANT CHANGE UP (ref 8.4–10.5)
CHLORIDE SERPL-SCNC: 105 MMOL/L — SIGNIFICANT CHANGE UP (ref 96–108)
CO2 SERPL-SCNC: 18 MMOL/L — LOW (ref 22–31)
CREAT SERPL-MCNC: 1.02 MG/DL — SIGNIFICANT CHANGE UP (ref 0.5–1.3)
EGFR: 86 ML/MIN/1.73M2 — SIGNIFICANT CHANGE UP
EOSINOPHIL # BLD AUTO: 0.17 K/UL — SIGNIFICANT CHANGE UP (ref 0–0.5)
EOSINOPHIL NFR BLD AUTO: 4.6 % — SIGNIFICANT CHANGE UP (ref 0–6)
GIANT PLATELETS BLD QL SMEAR: PRESENT — SIGNIFICANT CHANGE UP
GLUCOSE BLDC GLUCOMTR-MCNC: 88 MG/DL — SIGNIFICANT CHANGE UP (ref 70–99)
GLUCOSE BLDC GLUCOMTR-MCNC: 93 MG/DL — SIGNIFICANT CHANGE UP (ref 70–99)
GLUCOSE BLDC GLUCOMTR-MCNC: 93 MG/DL — SIGNIFICANT CHANGE UP (ref 70–99)
GLUCOSE BLDC GLUCOMTR-MCNC: 96 MG/DL — SIGNIFICANT CHANGE UP (ref 70–99)
GLUCOSE SERPL-MCNC: 86 MG/DL — SIGNIFICANT CHANGE UP (ref 70–99)
HCT VFR BLD CALC: 24.5 % — LOW (ref 39–50)
HGB BLD-MCNC: 8.1 G/DL — LOW (ref 13–17)
INR BLD: 2.02 RATIO — HIGH (ref 0.85–1.16)
LYMPHOCYTES # BLD AUTO: 1.24 K/UL — SIGNIFICANT CHANGE UP (ref 1–3.3)
LYMPHOCYTES # BLD AUTO: 33.6 % — SIGNIFICANT CHANGE UP (ref 13–44)
MAGNESIUM SERPL-MCNC: 1.5 MG/DL — LOW (ref 1.6–2.6)
MANUAL SMEAR VERIFICATION: SIGNIFICANT CHANGE UP
MCHC RBC-ENTMCNC: 27 PG — SIGNIFICANT CHANGE UP (ref 27–34)
MCHC RBC-ENTMCNC: 33.1 GM/DL — SIGNIFICANT CHANGE UP (ref 32–36)
MCV RBC AUTO: 81.7 FL — SIGNIFICANT CHANGE UP (ref 80–100)
MICROCYTES BLD QL: SLIGHT — SIGNIFICANT CHANGE UP
MONOCYTES # BLD AUTO: 0.37 K/UL — SIGNIFICANT CHANGE UP (ref 0–0.9)
MONOCYTES NFR BLD AUTO: 10 % — SIGNIFICANT CHANGE UP (ref 2–14)
NEUTROPHILS # BLD AUTO: 1.87 K/UL — SIGNIFICANT CHANGE UP (ref 1.8–7.4)
NEUTROPHILS NFR BLD AUTO: 50.9 % — SIGNIFICANT CHANGE UP (ref 43–77)
PHOSPHATE SERPL-MCNC: 3 MG/DL — SIGNIFICANT CHANGE UP (ref 2.5–4.5)
PLAT MORPH BLD: NORMAL — SIGNIFICANT CHANGE UP
PLATELET # BLD AUTO: 204 K/UL — SIGNIFICANT CHANGE UP (ref 150–400)
POIKILOCYTOSIS BLD QL AUTO: SIGNIFICANT CHANGE UP
POTASSIUM SERPL-MCNC: 3.8 MMOL/L — SIGNIFICANT CHANGE UP (ref 3.5–5.3)
POTASSIUM SERPL-SCNC: 3.8 MMOL/L — SIGNIFICANT CHANGE UP (ref 3.5–5.3)
PROT SERPL-MCNC: 7.7 G/DL — SIGNIFICANT CHANGE UP (ref 6–8.3)
PROTHROM AB SERPL-ACNC: 22.9 SEC — HIGH (ref 9.9–13.4)
RBC # BLD: 3 M/UL — LOW (ref 4.2–5.8)
RBC # FLD: SIGNIFICANT CHANGE UP (ref 10.3–14.5)
RBC BLD AUTO: ABNORMAL
SCHISTOCYTES BLD QL AUTO: SLIGHT — SIGNIFICANT CHANGE UP
SMUDGE CELLS # BLD: PRESENT — SIGNIFICANT CHANGE UP
SODIUM SERPL-SCNC: 132 MMOL/L — LOW (ref 135–145)
SPHEROCYTES BLD QL SMEAR: SLIGHT — SIGNIFICANT CHANGE UP
STRONGYLOIDES AB SER-ACNC: NEGATIVE — SIGNIFICANT CHANGE UP
TARGETS BLD QL SMEAR: SLIGHT — SIGNIFICANT CHANGE UP
WBC # BLD: 3.68 K/UL — LOW (ref 3.8–10.5)
WBC # FLD AUTO: 3.68 K/UL — LOW (ref 3.8–10.5)

## 2024-10-07 PROCEDURE — 99232 SBSQ HOSP IP/OBS MODERATE 35: CPT | Mod: GC

## 2024-10-07 RX ORDER — HYDROXYZINE PAMOATE 50 MG
25 CAPSULE ORAL
Refills: 0 | Status: DISCONTINUED | OUTPATIENT
Start: 2024-10-07 | End: 2024-10-09

## 2024-10-07 RX ORDER — HYDROXYZINE PAMOATE 50 MG
25 CAPSULE ORAL ONCE
Refills: 0 | Status: COMPLETED | OUTPATIENT
Start: 2024-10-07 | End: 2024-10-07

## 2024-10-07 RX ORDER — PNEUMOCOCCAL 20-VALENT CONJUGATE VACCINE 2.2; 2.2; 2.2; 2.2; 2.2; 2.2; 2.2; 2.2; 2.2; 2.2; 2.2; 2.2; 2.2; 2.2; 2.2; 2.2; 4.4; 2.2; 2.2; 2.2 UG/.5ML; UG/.5ML; UG/.5ML; UG/.5ML; UG/.5ML; UG/.5ML; UG/.5ML; UG/.5ML; UG/.5ML; UG/.5ML; UG/.5ML; UG/.5ML; UG/.5ML; UG/.5ML; UG/.5ML; UG/.5ML; UG/.5ML; UG/.5ML; UG/.5ML; UG/.5ML
0.5 INJECTION, SUSPENSION INTRAMUSCULAR ONCE
Refills: 0 | Status: DISCONTINUED | OUTPATIENT
Start: 2024-10-08 | End: 2024-10-11

## 2024-10-07 RX ORDER — DIPHENHYDRAMINE HCL 12.5MG/5ML
25 LIQUID (ML) ORAL ONCE
Refills: 0 | Status: COMPLETED | OUTPATIENT
Start: 2024-10-07 | End: 2024-10-08

## 2024-10-07 RX ORDER — CHOLESTYRAMINE 4 G/9G
4 POWDER, FOR SUSPENSION ORAL ONCE
Refills: 0 | Status: COMPLETED | OUTPATIENT
Start: 2024-10-07 | End: 2024-10-07

## 2024-10-07 RX ORDER — MAGNESIUM SULFATE 500 MG/ML
2 VIAL (ML) INJECTION
Refills: 0 | Status: COMPLETED | OUTPATIENT
Start: 2024-10-07 | End: 2024-10-07

## 2024-10-07 RX ORDER — TETANUS TOXOID, REDUCED DIPHTHERIA TOXOID AND ACELLULAR PERTUSSIS VACCINE, ADSORBED 5; 2.5; 8; 8; 2.5 [IU]/.5ML; [IU]/.5ML; UG/.5ML; UG/.5ML; UG/.5ML
0.5 SUSPENSION INTRAMUSCULAR ONCE
Refills: 0 | Status: DISCONTINUED | OUTPATIENT
Start: 2024-10-09 | End: 2024-10-11

## 2024-10-07 RX ADMIN — Medication 25 MILLIGRAM(S): at 02:00

## 2024-10-07 RX ADMIN — Medication 25 GRAM(S): at 10:55

## 2024-10-07 RX ADMIN — Medication 10 MILLIGRAM(S): at 08:40

## 2024-10-07 RX ADMIN — Medication 25 GRAM(S): at 08:40

## 2024-10-07 RX ADMIN — Medication 10 MILLIGRAM(S): at 00:15

## 2024-10-07 RX ADMIN — Medication 17 GRAM(S): at 12:09

## 2024-10-07 RX ADMIN — ANTI-ITCH CREAM 1 APPLICATION(S): 1 OINTMENT TOPICAL at 17:19

## 2024-10-07 RX ADMIN — Medication 400 MILLIGRAM(S): at 08:40

## 2024-10-07 RX ADMIN — PANTOPRAZOLE SODIUM 40 MILLIGRAM(S): 40 TABLET, DELAYED RELEASE ORAL at 05:06

## 2024-10-07 RX ADMIN — Medication 400 MILLIGRAM(S): at 17:19

## 2024-10-07 RX ADMIN — CHOLESTYRAMINE 4 GRAM(S): 4 POWDER, FOR SUSPENSION ORAL at 21:30

## 2024-10-07 RX ADMIN — FOLIC ACID 1 MILLIGRAM(S): 1 TABLET ORAL at 12:10

## 2024-10-07 RX ADMIN — LACTULOSE 30 GRAM(S): 10 SOLUTION ORAL; RECTAL at 05:06

## 2024-10-07 RX ADMIN — CHOLESTYRAMINE 4 GRAM(S): 4 POWDER, FOR SUSPENSION ORAL at 09:48

## 2024-10-07 RX ADMIN — CHOLESTYRAMINE 4 GRAM(S): 4 POWDER, FOR SUSPENSION ORAL at 15:58

## 2024-10-07 RX ADMIN — LABETALOL HYDROCHLORIDE 200 MILLIGRAM(S): 200 TABLET, FILM COATED ORAL at 05:06

## 2024-10-07 RX ADMIN — ANTI-ITCH CREAM 1 APPLICATION(S): 1 OINTMENT TOPICAL at 05:06

## 2024-10-07 RX ADMIN — CHLORHEXIDINE GLUCONATE ORAL RINSE 1 APPLICATION(S): 1.2 SOLUTION DENTAL at 12:15

## 2024-10-07 RX ADMIN — Medication 25 MILLIGRAM(S): at 12:09

## 2024-10-07 RX ADMIN — LACTULOSE 30 GRAM(S): 10 SOLUTION ORAL; RECTAL at 21:30

## 2024-10-07 RX ADMIN — THIAMINE HYDROCHLORIDE 100 MILLIGRAM(S): 100 INJECTION, SOLUTION INTRAMUSCULAR; INTRAVENOUS at 12:09

## 2024-10-07 NOTE — PROGRESS NOTE ADULT - ATTENDING SUPERVISION STATEMENT
Fellow
Resident
Fellow
Resident
Fellow
Resident
Resident
Fellow
Resident

## 2024-10-07 NOTE — PROGRESS NOTE ADULT - SUBJECTIVE AND OBJECTIVE BOX
Interval Events:   no acute events  patient c/o generalized pruritis, temporarily relieved with hydroxyzine   no other complaints  vitals, exam and labs are stable  MELD 28    Hospital Medications:  chlorhexidine 2% Cloths 1 Application(s) Topical daily  cholestyramine Powder (Sugar-Free) 4 Gram(s) Oral two times a day  folic acid 1 milliGRAM(s) Oral daily  hydrocortisone 2.5% Lotion 1 Application(s) Topical two times a day  hydrOXYzine hydrochloride 25 milliGRAM(s) Oral two times a day PRN  lactulose Syrup 30 Gram(s) Oral every 8 hours  magnesium oxide 400 milliGRAM(s) Oral two times a day with meals  pantoprazole    Tablet 40 milliGRAM(s) Oral before breakfast  polyethylene glycol 3350 17 Gram(s) Oral daily  rifAXIMin 550 milliGRAM(s) Oral two times a day  thiamine 100 milliGRAM(s) Oral daily    ROS: See above.    PHYSICAL EXAM:   Vital Signs:  Vital Signs Last 24 Hrs  T(C): 36.8 (07 Oct 2024 09:17), Max: 37.3 (06 Oct 2024 21:00)  T(F): 98.3 (07 Oct 2024 09:17), Max: 99.2 (06 Oct 2024 21:00)  HR: 73 (07 Oct 2024 09:17) (61 - 73)  BP: 116/74 (07 Oct 2024 09:17) (115/64 - 152/90)  BP(mean): --  RR: 18 (07 Oct 2024 09:17) (18 - 18)  SpO2: 97% (07 Oct 2024 09:17) (97% - 99%)    Parameters below as of 07 Oct 2024 09:17  Patient On (Oxygen Delivery Method): room air    GENERAL:  NAD, resting comfortably in bed  HEENT:  sclera icteric  RESPIRATORY:  Normal Effort  CARDIAC:  HDS  ABDOMEN:  Soft, non-tender, non-distended  SKIN:  Warm & Dry. jaundice  NEURO:  Alert, conversant, no focal deficit    LABS:                        8.1    3.68  )-----------( 204      ( 07 Oct 2024 06:21 )             24.5     Mean Cell Volume: 81.7 fl (10-07-24 @ 06:21)    10-07    132[L]  |  105  |  11  ----------------------------<  86  3.8   |  18[L]  |  1.02    Ca    8.6      07 Oct 2024 06:21  Phos  3.0     10-07  Mg     1.5     10-07    TPro  7.7  /  Alb  2.3[L]  /  TBili  16.1[H]  /  DBili  x   /  AST  76[H]  /  ALT  42  /  AlkPhos  146[H]  10-07    LIVER FUNCTIONS - ( 07 Oct 2024 06:21 )  Alb: 2.3 g/dL / Pro: 7.7 g/dL / ALK PHOS: 146 U/L / ALT: 42 U/L / AST: 76 U/L / GGT: x           PT/INR - ( 07 Oct 2024 06:21 )   PT: 22.9 sec;   INR: 2.02 ratio         PTT - ( 07 Oct 2024 06:21 )  PTT:35.5 sec

## 2024-10-07 NOTE — PROGRESS NOTE ADULT - ASSESSMENT
Impression:   59 years old male with h/o chronic ETOH abuse and dependence (1 bottle of Vodka a day) with long standing hx of alcohol withdrawal and DTs with frequent hospital/ICU admissions, alcoholic gastritis/esophagitis, PUD, HLD, hx of hypoxic respiratory failure requiring intubation due to aspiration PNA (most recent intubation Aril 2020),  staph PNA, COVID-19 infection Dec 2020 presented with abd pain at the OSH.    #Acute alcohol associated hepatitis and cirrhosis   #Alcohol-related liver disease  #Hepatic steatosis  Patient with multiple hospitalizations in the past for alcohol withdrawal and DT per records. Unclear last alcohol drink. he was admitted with AMS and intubated with findings of cerebral edema and concern for GUNJAN. Now extubated on 9/23 with improvement in mental status s/p Plex x3, mannitol and hypertonic saline.    High MDF score of 49  - Calculated MELD 3.0 score 34 >>28  - Ascites: s/p paracentesis 10/1 with 800cc fluid removal, SAAG > 1.1, Total protein 2.5, negative for SBP  - Varices: no EGD on file  - HE: now improved with medical therapy   - HCC: CT without lesion (not triple phase study)    Recommendations:   - OLT evaluation initiated - will need re-discuss as patient has poor insight regarding his medical condition. Unable to specify the cause of his liver disease and reported he does not have a drinking problem.   - Social work evaluation today  - Increase hydroxyzine to 25 mg BID PRN pruritis    - Cholestyramine 4 grams BID, topical hydrocortisone for 3 days.   - Rifaximin 550 mg BID, lactulose to goal 3-4 BMs per day.   - Low sodium diet  - Thiamine, FA, MV supplementation  - Alcohol cessation counseling  - Appreciate input from Psychiatry    - PT/OT/Nutrition   - Trend CBC, CMP & PT/INR daily    Mayur Castillo MD  Gastroenterology/Hepatology Fellow

## 2024-10-07 NOTE — PROGRESS NOTE ADULT - ATTENDING COMMENTS
58yo male with PMH ALD cirrhosis (+HE, +ascites), AUD (1 bottle vodka daily) c/b multiple hospital/ICU admissions for alcohol withdrawal/DTs, PUD, HLD, who initially presented to [facility] on 9/13/24 with abd pain, n/v in the setting of recent alcohol use (last drink 9/11/24) and ingestion of a large amount of acetaminophen. Transferred to [facility] on 9/15/24. [facility] course c/b intubation for AMS, found to have cerebral edema and c/f GUNJAN, s/p PLEX x3, mannitol, hypertonic saline; extubated on 9/23/24. OLT eval opened on 10/3.    Pt with very limited insight, stating that he was never told he had liver disease and “I don’t have a problem with alcohol.” Continues to c/o generalized pruritus, which he attributes to the PNA vaccine.    MELD 3.0 = 28. ABO A.  - AH/ALD cirrhosis: MDF 49. Continue MVI, folic acid, thiamine daily.  - HE: Continue lactulose, miralax, rifaximin. Goal 3-4 BMs/day.  - Pruritus: Continue cholestyramine 4g BID, atarax 25mg BID PRN.  - Ascites: CT A/P w IV contrast (9/30) showed mild-mod ascites. S/p 800cc paracentesis 10/1: SAAG > 1.1, Total protein 2.5, SBP neg. Will reassess volume status daily to determine need for diuretics (prior ROSA has resolved). Low Na diet.    #OLT eval:  - OLT labs done  - Cards: TTE (9/23/24) wnl.  - ID: consulted  - SW: consulted  - Psych: SIPAT 53 (poor candidate).  - EV screening: No prior EGD on file.  - MR Abd not yet performed.    Yoselyn Munroe MD  Transplant Hepatology

## 2024-10-08 LAB
ALBUMIN SERPL ELPH-MCNC: 2.6 G/DL — LOW (ref 3.3–5)
ALP SERPL-CCNC: 159 U/L — HIGH (ref 40–120)
ALT FLD-CCNC: 45 U/L — SIGNIFICANT CHANGE UP (ref 10–45)
ANA PAT FLD IF-IMP: ABNORMAL
ANA TITR SER: ABNORMAL
ANION GAP SERPL CALC-SCNC: 12 MMOL/L — SIGNIFICANT CHANGE UP (ref 5–17)
APTT BLD: 33.1 SEC — SIGNIFICANT CHANGE UP (ref 24.5–35.6)
AST SERPL-CCNC: 83 U/L — HIGH (ref 10–40)
BASOPHILS # BLD AUTO: 0.03 K/UL — SIGNIFICANT CHANGE UP (ref 0–0.2)
BASOPHILS NFR BLD AUTO: 0.7 % — SIGNIFICANT CHANGE UP (ref 0–2)
BILIRUB SERPL-MCNC: 17.3 MG/DL — HIGH (ref 0.2–1.2)
BUN SERPL-MCNC: 10 MG/DL — SIGNIFICANT CHANGE UP (ref 7–23)
CALCIUM SERPL-MCNC: 8.9 MG/DL — SIGNIFICANT CHANGE UP (ref 8.4–10.5)
CHLORIDE SERPL-SCNC: 103 MMOL/L — SIGNIFICANT CHANGE UP (ref 96–108)
CO2 SERPL-SCNC: 17 MMOL/L — LOW (ref 22–31)
CREAT SERPL-MCNC: 0.92 MG/DL — SIGNIFICANT CHANGE UP (ref 0.5–1.3)
EGFR: 97 ML/MIN/1.73M2 — SIGNIFICANT CHANGE UP
EOSINOPHIL # BLD AUTO: 0.2 K/UL — SIGNIFICANT CHANGE UP (ref 0–0.5)
EOSINOPHIL NFR BLD AUTO: 4.9 % — SIGNIFICANT CHANGE UP (ref 0–6)
GLUCOSE BLDC GLUCOMTR-MCNC: 101 MG/DL — HIGH (ref 70–99)
GLUCOSE BLDC GLUCOMTR-MCNC: 102 MG/DL — HIGH (ref 70–99)
GLUCOSE BLDC GLUCOMTR-MCNC: 107 MG/DL — HIGH (ref 70–99)
GLUCOSE BLDC GLUCOMTR-MCNC: 83 MG/DL — SIGNIFICANT CHANGE UP (ref 70–99)
GLUCOSE SERPL-MCNC: 84 MG/DL — SIGNIFICANT CHANGE UP (ref 70–99)
HBV SURFACE AB SER-ACNC: 35.4 MIU/ML — SIGNIFICANT CHANGE UP
HCT VFR BLD CALC: 25 % — LOW (ref 39–50)
HGB BLD-MCNC: 8.6 G/DL — LOW (ref 13–17)
IMM GRANULOCYTES NFR BLD AUTO: 1.5 % — HIGH (ref 0–0.9)
INR BLD: 1.91 RATIO — HIGH (ref 0.85–1.16)
LYMPHOCYTES # BLD AUTO: 1.52 K/UL — SIGNIFICANT CHANGE UP (ref 1–3.3)
LYMPHOCYTES # BLD AUTO: 37.3 % — SIGNIFICANT CHANGE UP (ref 13–44)
MAGNESIUM SERPL-MCNC: 1.6 MG/DL — SIGNIFICANT CHANGE UP (ref 1.6–2.6)
MCHC RBC-ENTMCNC: 27.4 PG — SIGNIFICANT CHANGE UP (ref 27–34)
MCHC RBC-ENTMCNC: 34.4 GM/DL — SIGNIFICANT CHANGE UP (ref 32–36)
MCV RBC AUTO: 79.6 FL — LOW (ref 80–100)
MITOCHONDRIA AB SER-ACNC: ABNORMAL
MONOCYTES # BLD AUTO: 0.58 K/UL — SIGNIFICANT CHANGE UP (ref 0–0.9)
MONOCYTES NFR BLD AUTO: 14.2 % — HIGH (ref 2–14)
NEUTROPHILS # BLD AUTO: 1.69 K/UL — LOW (ref 1.8–7.4)
NEUTROPHILS NFR BLD AUTO: 41.4 % — LOW (ref 43–77)
NRBC # BLD: 0 /100 WBCS — SIGNIFICANT CHANGE UP (ref 0–0)
PETH 16:0/18:1: 196 NG/ML — HIGH
PETH 16:0/18:2: 45 NG/ML — HIGH
PETH COMMENTS: SIGNIFICANT CHANGE UP
PHOSPHATE SERPL-MCNC: 3.1 MG/DL — SIGNIFICANT CHANGE UP (ref 2.5–4.5)
PLATELET # BLD AUTO: 239 K/UL — SIGNIFICANT CHANGE UP (ref 150–400)
POTASSIUM SERPL-MCNC: 3.9 MMOL/L — SIGNIFICANT CHANGE UP (ref 3.5–5.3)
POTASSIUM SERPL-SCNC: 3.9 MMOL/L — SIGNIFICANT CHANGE UP (ref 3.5–5.3)
PROT SERPL-MCNC: 8.6 G/DL — HIGH (ref 6–8.3)
PROTHROM AB SERPL-ACNC: 21.8 SEC — HIGH (ref 9.9–13.4)
RBC # BLD: 3.14 M/UL — LOW (ref 4.2–5.8)
RBC # FLD: SIGNIFICANT CHANGE UP (ref 10.3–14.5)
SMOOTH MUSCLE AB SER-ACNC: ABNORMAL
SODIUM SERPL-SCNC: 132 MMOL/L — LOW (ref 135–145)
WBC # BLD: 4.08 K/UL — SIGNIFICANT CHANGE UP (ref 3.8–10.5)
WBC # FLD AUTO: 4.08 K/UL — SIGNIFICANT CHANGE UP (ref 3.8–10.5)

## 2024-10-08 RX ORDER — DIPHENHYDRAMINE HCL 12.5MG/5ML
25 LIQUID (ML) ORAL ONCE
Refills: 0 | Status: COMPLETED | OUTPATIENT
Start: 2024-10-08 | End: 2024-10-08

## 2024-10-08 RX ORDER — MAGNESIUM SULFATE 500 MG/ML
2 VIAL (ML) INJECTION
Refills: 0 | Status: COMPLETED | OUTPATIENT
Start: 2024-10-08 | End: 2024-10-08

## 2024-10-08 RX ADMIN — LACTULOSE 30 GRAM(S): 10 SOLUTION ORAL; RECTAL at 05:26

## 2024-10-08 RX ADMIN — ANTI-ITCH CREAM 1 APPLICATION(S): 1 OINTMENT TOPICAL at 21:26

## 2024-10-08 RX ADMIN — ANTI-ITCH CREAM 1 APPLICATION(S): 1 OINTMENT TOPICAL at 05:27

## 2024-10-08 RX ADMIN — Medication 25 GRAM(S): at 08:52

## 2024-10-08 RX ADMIN — Medication 25 GRAM(S): at 11:01

## 2024-10-08 RX ADMIN — Medication 400 MILLIGRAM(S): at 17:47

## 2024-10-08 RX ADMIN — Medication 25 MILLIGRAM(S): at 21:25

## 2024-10-08 RX ADMIN — CHLORHEXIDINE GLUCONATE ORAL RINSE 1 APPLICATION(S): 1.2 SOLUTION DENTAL at 17:38

## 2024-10-08 RX ADMIN — FOLIC ACID 1 MILLIGRAM(S): 1 TABLET ORAL at 11:01

## 2024-10-08 RX ADMIN — THIAMINE HYDROCHLORIDE 100 MILLIGRAM(S): 100 INJECTION, SOLUTION INTRAMUSCULAR; INTRAVENOUS at 11:01

## 2024-10-08 RX ADMIN — CHOLESTYRAMINE 4 GRAM(S): 4 POWDER, FOR SUSPENSION ORAL at 08:52

## 2024-10-08 RX ADMIN — LACTULOSE 30 GRAM(S): 10 SOLUTION ORAL; RECTAL at 21:25

## 2024-10-08 RX ADMIN — Medication 17 GRAM(S): at 11:01

## 2024-10-08 RX ADMIN — CHOLESTYRAMINE 4 GRAM(S): 4 POWDER, FOR SUSPENSION ORAL at 21:25

## 2024-10-08 RX ADMIN — Medication 25 MILLIGRAM(S): at 01:13

## 2024-10-08 RX ADMIN — Medication 25 MILLIGRAM(S): at 23:19

## 2024-10-08 RX ADMIN — LACTULOSE 30 GRAM(S): 10 SOLUTION ORAL; RECTAL at 14:53

## 2024-10-08 RX ADMIN — Medication 400 MILLIGRAM(S): at 08:52

## 2024-10-08 RX ADMIN — PANTOPRAZOLE SODIUM 40 MILLIGRAM(S): 40 TABLET, DELAYED RELEASE ORAL at 05:26

## 2024-10-08 NOTE — PROGRESS NOTE ADULT - SUBJECTIVE AND OBJECTIVE BOX
Interval Events:   no acute events  patient was re-evaluated by Psychiatry with Sipat of 53 (poor candidate for transplant)  patient continues to demonstrate a lack of insight regarding the illness of his liver and issues related to alcohol consumption   c/o ongoing pruritis. last dose of PRN Atarax was given yesterday AM   MELD 27  updates were discussed with spouse (at bedside) and daughter (via telephone)    Hospital Medications:  chlorhexidine 2% Cloths 1 Application(s) Topical daily  cholestyramine Powder (Sugar-Free) 4 Gram(s) Oral two times a day  folic acid 1 milliGRAM(s) Oral daily  hydrocortisone 2.5% Lotion 1 Application(s) Topical two times a day  hydrOXYzine hydrochloride 25 milliGRAM(s) Oral two times a day PRN  lactulose Syrup 30 Gram(s) Oral every 8 hours  magnesium oxide 400 milliGRAM(s) Oral two times a day with meals  pantoprazole    Tablet 40 milliGRAM(s) Oral before breakfast  pneumococcal  20 Vaccine (PREVNAR 20) 0.5 milliLiter(s) IntraMuscular once  polyethylene glycol 3350 17 Gram(s) Oral daily  rifAXIMin 550 milliGRAM(s) Oral two times a day  thiamine 100 milliGRAM(s) Oral daily    ROS: See above.    PHYSICAL EXAM:   Vital Signs:  Vital Signs Last 24 Hrs  T(C): 37.1 (08 Oct 2024 09:00), Max: 37.3 (08 Oct 2024 00:58)  T(F): 98.8 (08 Oct 2024 09:00), Max: 99.1 (08 Oct 2024 00:58)  HR: 62 (08 Oct 2024 09:00) (51 - 62)  BP: 140/69 (08 Oct 2024 09:00) (140/69 - 151/81)  BP(mean): --  RR: 18 (08 Oct 2024 09:00) (18 - 18)  SpO2: 100% (08 Oct 2024 09:00) (98% - 100%)    Parameters below as of 08 Oct 2024 09:00  Patient On (Oxygen Delivery Method): room air    GENERAL:  NAD, resting comfortably in bed  HEENT:  sclera icteric  RESPIRATORY:  Normal Effort  CARDIAC:  HDS  ABDOMEN:  Soft, non-tender, non-distended  SKIN:  Warm & Dry. jaundice  NEURO:  Alert, conversant, no focal deficit    LABS:                        8.6    4.08  )-----------( 239      ( 08 Oct 2024 06:40 )             25.0     Mean Cell Volume: 79.6 fl (10-08-24 @ 06:40)    10-08    132[L]  |  103  |  10  ----------------------------<  84  3.9   |  17[L]  |  0.92    Ca    8.9      08 Oct 2024 06:40  Phos  3.1     10-08  Mg     1.6     10-08    TPro  8.6[H]  /  Alb  2.6[L]  /  TBili  17.3[H]  /  DBili  x   /  AST  83[H]  /  ALT  45  /  AlkPhos  159[H]  10-08    LIVER FUNCTIONS - ( 08 Oct 2024 06:40 )  Alb: 2.6 g/dL / Pro: 8.6 g/dL / ALK PHOS: 159 U/L / ALT: 45 U/L / AST: 83 U/L / GGT: x           PT/INR - ( 08 Oct 2024 06:40 )   PT: 21.8 sec;   INR: 1.91 ratio         PTT - ( 08 Oct 2024 06:40 )  PTT:33.1 sec

## 2024-10-08 NOTE — PROGRESS NOTE ADULT - ASSESSMENT
Impression:   59 years old male with h/o chronic ETOH abuse and dependence (1 bottle of Vodka a day) with long standing hx of alcohol withdrawal and DTs with frequent hospital/ICU admissions, alcoholic gastritis/esophagitis, PUD, HLD, hx of hypoxic respiratory failure requiring intubation due to aspiration PNA (most recent intubation Aril 2020),  staph PNA, COVID-19 infection Dec 2020 presented with abd pain at the OSH.    #Acute alcohol associated hepatitis and cirrhosis   #Alcohol-related liver disease  #Hepatic steatosis  Patient with multiple hospitalizations in the past for alcohol withdrawal and DT per records. Unclear last alcohol drink. he was admitted with AMS and intubated with findings of cerebral edema and concern for GUNJAN. Now extubated on 9/23 with improvement in mental status s/p Plex x3, mannitol and hypertonic saline.    High MDF score of 49  - Calculated MELD 3.0 score 34 >>27  - Ascites: s/p paracentesis 10/1 with 800cc fluid removal, SAAG > 1.1, Total protein 2.5, negative for SBP  - Varices: no EGD on file  - HE: now improved with medical therapy   - HCC: CT without lesion (not triple phase study)    Recommendations:   - OLT evaluation initiated - plan for re-discussion at Ad Hoc meeting Wednesday   - Hydroxyzine 25 mg BID PRN pruritis - reinforced PRN administration   - Cholestyramine 4 grams BID, topical hydrocortisone for 3 days.   - Rifaximin 550 mg BID, lactulose to goal 3-4 BMs per day.   - Low sodium diet  - Thiamine, FA, MV supplementation  - Alcohol cessation counseling  - Appreciate input from Psychiatry    - PT/OT/Nutrition   - Trend CBC, CMP & PT/INR daily    Mayur Castillo MD  Gastroenterology/Hepatology Fellow

## 2024-10-08 NOTE — PROGRESS NOTE ADULT - SUBJECTIVE AND OBJECTIVE BOX
Stefan Degroot is a 59 year old male being evaluated for a liver transplant. PMH: ETOH abuse    NKDA    Home Medications:  folic acid 1 mg oral tablet: 1 tab(s) orally once a day (14 Sep 2024 13:01)    MEDICATIONS  (STANDING):  chlorhexidine 2% Cloths 1 Application(s) Topical daily  cholestyramine Powder (Sugar-Free) 4 Gram(s) Oral two times a day  folic acid 1 milliGRAM(s) Oral daily  hydrocortisone 2.5% Lotion 1 Application(s) Topical two times a day  lactulose Syrup 30 Gram(s) Oral every 8 hours  magnesium oxide 400 milliGRAM(s) Oral two times a day with meals  pantoprazole    Tablet 40 milliGRAM(s) Oral before breakfast  pneumococcal  20 Vaccine (PREVNAR 20) 0.5 milliLiter(s) IntraMuscular once  polyethylene glycol 3350 17 Gram(s) Oral daily  rifAXIMin 550 milliGRAM(s) Oral two times a day  thiamine 100 milliGRAM(s) Oral daily    MEDICATIONS  (PRN):  hydrOXYzine hydrochloride 25 milliGRAM(s) Oral two times a day PRN Itching    The patient has no pharmacologic contraindication to transplant and is cleared by pharmacy.

## 2024-10-09 LAB
ALBUMIN SERPL ELPH-MCNC: 2.5 G/DL — LOW (ref 3.3–5)
ALP SERPL-CCNC: 151 U/L — HIGH (ref 40–120)
ALT FLD-CCNC: 41 U/L — SIGNIFICANT CHANGE UP (ref 10–45)
ANION GAP SERPL CALC-SCNC: 11 MMOL/L — SIGNIFICANT CHANGE UP (ref 5–17)
APTT BLD: 36 SEC — HIGH (ref 24.5–35.6)
AST SERPL-CCNC: 78 U/L — HIGH (ref 10–40)
BASOPHILS # BLD AUTO: 0.03 K/UL — SIGNIFICANT CHANGE UP (ref 0–0.2)
BASOPHILS NFR BLD AUTO: 0.8 % — SIGNIFICANT CHANGE UP (ref 0–2)
BILIRUB SERPL-MCNC: 15.6 MG/DL — HIGH (ref 0.2–1.2)
BLD GP AB SCN SERPL QL: NEGATIVE — SIGNIFICANT CHANGE UP
BUN SERPL-MCNC: 10 MG/DL — SIGNIFICANT CHANGE UP (ref 7–23)
CALCIUM SERPL-MCNC: 8.6 MG/DL — SIGNIFICANT CHANGE UP (ref 8.4–10.5)
CHLORIDE SERPL-SCNC: 105 MMOL/L — SIGNIFICANT CHANGE UP (ref 96–108)
CO2 SERPL-SCNC: 15 MMOL/L — LOW (ref 22–31)
CREAT SERPL-MCNC: 0.92 MG/DL — SIGNIFICANT CHANGE UP (ref 0.5–1.3)
EGFR: 97 ML/MIN/1.73M2 — SIGNIFICANT CHANGE UP
EOSINOPHIL # BLD AUTO: 0.18 K/UL — SIGNIFICANT CHANGE UP (ref 0–0.5)
EOSINOPHIL NFR BLD AUTO: 4.8 % — SIGNIFICANT CHANGE UP (ref 0–6)
GAMMA INTERFERON BACKGROUND BLD IA-ACNC: 0.03 IU/ML — SIGNIFICANT CHANGE UP
GLUCOSE BLDC GLUCOMTR-MCNC: 117 MG/DL — HIGH (ref 70–99)
GLUCOSE BLDC GLUCOMTR-MCNC: 92 MG/DL — SIGNIFICANT CHANGE UP (ref 70–99)
GLUCOSE SERPL-MCNC: 88 MG/DL — SIGNIFICANT CHANGE UP (ref 70–99)
HCT VFR BLD CALC: 24.9 % — LOW (ref 39–50)
HDV AB SER-ACNC: NEGATIVE — SIGNIFICANT CHANGE UP
HDV IGM SER QL IA: SIGNIFICANT CHANGE UP
HGB BLD-MCNC: 8.5 G/DL — LOW (ref 13–17)
IMM GRANULOCYTES NFR BLD AUTO: 1.6 % — HIGH (ref 0–0.9)
INR BLD: 1.92 RATIO — HIGH (ref 0.85–1.16)
LYMPHOCYTES # BLD AUTO: 1.33 K/UL — SIGNIFICANT CHANGE UP (ref 1–3.3)
LYMPHOCYTES # BLD AUTO: 35.2 % — SIGNIFICANT CHANGE UP (ref 13–44)
M TB IFN-G BLD-IMP: NEGATIVE — SIGNIFICANT CHANGE UP
M TB IFN-G CD4+ BCKGRND COR BLD-ACNC: 0.02 IU/ML — SIGNIFICANT CHANGE UP
M TB IFN-G CD4+CD8+ BCKGRND COR BLD-ACNC: 0.01 IU/ML — SIGNIFICANT CHANGE UP
MAGNESIUM SERPL-MCNC: 1.7 MG/DL — SIGNIFICANT CHANGE UP (ref 1.6–2.6)
MCHC RBC-ENTMCNC: 27.6 PG — SIGNIFICANT CHANGE UP (ref 27–34)
MCHC RBC-ENTMCNC: 34.1 GM/DL — SIGNIFICANT CHANGE UP (ref 32–36)
MCV RBC AUTO: 80.8 FL — SIGNIFICANT CHANGE UP (ref 80–100)
MONOCYTES # BLD AUTO: 0.68 K/UL — SIGNIFICANT CHANGE UP (ref 0–0.9)
MONOCYTES NFR BLD AUTO: 18 % — HIGH (ref 2–14)
NEUTROPHILS # BLD AUTO: 1.5 K/UL — LOW (ref 1.8–7.4)
NEUTROPHILS NFR BLD AUTO: 39.6 % — LOW (ref 43–77)
NRBC # BLD: 0 /100 WBCS — SIGNIFICANT CHANGE UP (ref 0–0)
PHOSPHATE SERPL-MCNC: 2.9 MG/DL — SIGNIFICANT CHANGE UP (ref 2.5–4.5)
PLATELET # BLD AUTO: 226 K/UL — SIGNIFICANT CHANGE UP (ref 150–400)
POTASSIUM SERPL-MCNC: 3.7 MMOL/L — SIGNIFICANT CHANGE UP (ref 3.5–5.3)
POTASSIUM SERPL-SCNC: 3.7 MMOL/L — SIGNIFICANT CHANGE UP (ref 3.5–5.3)
PROT SERPL-MCNC: 8.4 G/DL — HIGH (ref 6–8.3)
PROTHROM AB SERPL-ACNC: 21.7 SEC — HIGH (ref 9.9–13.4)
QUANT TB PLUS MITOGEN MINUS NIL: 1.08 IU/ML — SIGNIFICANT CHANGE UP
RBC # BLD: 3.08 M/UL — LOW (ref 4.2–5.8)
RBC # FLD: 33.9 % — HIGH (ref 10.3–14.5)
RH IG SCN BLD-IMP: POSITIVE — SIGNIFICANT CHANGE UP
SODIUM SERPL-SCNC: 131 MMOL/L — LOW (ref 135–145)
WBC # BLD: 3.78 K/UL — LOW (ref 3.8–10.5)
WBC # FLD AUTO: 3.78 K/UL — LOW (ref 3.8–10.5)

## 2024-10-09 PROCEDURE — 74183 MRI ABD W/O CNTR FLWD CNTR: CPT | Mod: 26

## 2024-10-09 RX ORDER — MAGNESIUM SULFATE 500 MG/ML
1 VIAL (ML) INJECTION ONCE
Refills: 0 | Status: COMPLETED | OUTPATIENT
Start: 2024-10-09 | End: 2024-10-09

## 2024-10-09 RX ORDER — DIPHENHYDRAMINE HCL 12.5MG/5ML
25 LIQUID (ML) ORAL ONCE
Refills: 0 | Status: COMPLETED | OUTPATIENT
Start: 2024-10-09 | End: 2024-10-09

## 2024-10-09 RX ORDER — HYDROXYZINE PAMOATE 50 MG
25 CAPSULE ORAL THREE TIMES A DAY
Refills: 0 | Status: DISCONTINUED | OUTPATIENT
Start: 2024-10-09 | End: 2024-10-11

## 2024-10-09 RX ADMIN — Medication 100 GRAM(S): at 12:58

## 2024-10-09 RX ADMIN — LACTULOSE 30 GRAM(S): 10 SOLUTION ORAL; RECTAL at 15:24

## 2024-10-09 RX ADMIN — CHOLESTYRAMINE 4 GRAM(S): 4 POWDER, FOR SUSPENSION ORAL at 20:48

## 2024-10-09 RX ADMIN — Medication 25 MILLIGRAM(S): at 23:42

## 2024-10-09 RX ADMIN — CHOLESTYRAMINE 4 GRAM(S): 4 POWDER, FOR SUSPENSION ORAL at 09:25

## 2024-10-09 RX ADMIN — Medication 400 MILLIGRAM(S): at 17:44

## 2024-10-09 RX ADMIN — Medication 25 MILLIGRAM(S): at 20:47

## 2024-10-09 RX ADMIN — LACTULOSE 30 GRAM(S): 10 SOLUTION ORAL; RECTAL at 20:48

## 2024-10-09 RX ADMIN — Medication 400 MILLIGRAM(S): at 09:24

## 2024-10-09 RX ADMIN — LACTULOSE 30 GRAM(S): 10 SOLUTION ORAL; RECTAL at 06:02

## 2024-10-09 RX ADMIN — PANTOPRAZOLE SODIUM 40 MILLIGRAM(S): 40 TABLET, DELAYED RELEASE ORAL at 06:02

## 2024-10-09 RX ADMIN — Medication 25 MILLIGRAM(S): at 04:17

## 2024-10-09 RX ADMIN — FOLIC ACID 1 MILLIGRAM(S): 1 TABLET ORAL at 12:56

## 2024-10-09 RX ADMIN — THIAMINE HYDROCHLORIDE 100 MILLIGRAM(S): 100 INJECTION, SOLUTION INTRAMUSCULAR; INTRAVENOUS at 12:56

## 2024-10-09 RX ADMIN — Medication 17 GRAM(S): at 12:56

## 2024-10-09 RX ADMIN — CHLORHEXIDINE GLUCONATE ORAL RINSE 1 APPLICATION(S): 1.2 SOLUTION DENTAL at 15:24

## 2024-10-09 NOTE — PROGRESS NOTE ADULT - SUBJECTIVE AND OBJECTIVE BOX
Interval Events:   no acute events  evaluated by social work and cleared for transplant   patient with ongoing pruritis, o/w no complaints  vitals, exam and labs remain stable  MELD 27    Hospital Medications:  chlorhexidine 2% Cloths 1 Application(s) Topical daily  cholestyramine Powder (Sugar-Free) 4 Gram(s) Oral two times a day  diphtheria/tetanus/pertussis (acellular) Vaccine (Adacel) 0.5 milliLiter(s) IntraMuscular once  folic acid 1 milliGRAM(s) Oral daily  hydrOXYzine hydrochloride 25 milliGRAM(s) Oral three times a day PRN  lactulose Syrup 30 Gram(s) Oral every 8 hours  magnesium oxide 400 milliGRAM(s) Oral two times a day with meals  pantoprazole    Tablet 40 milliGRAM(s) Oral before breakfast  pneumococcal  20 Vaccine (PREVNAR 20) 0.5 milliLiter(s) IntraMuscular once  polyethylene glycol 3350 17 Gram(s) Oral daily  rifAXIMin 550 milliGRAM(s) Oral two times a day  thiamine 100 milliGRAM(s) Oral daily    ROS: See above.    PHYSICAL EXAM:   Vital Signs:  Vital Signs Last 24 Hrs  T(C): 36.9 (09 Oct 2024 12:39), Max: 37.6 (09 Oct 2024 06:04)  T(F): 98.4 (09 Oct 2024 12:39), Max: 99.6 (09 Oct 2024 06:04)  HR: 56 (09 Oct 2024 12:39) (56 - 74)  BP: 149/77 (09 Oct 2024 12:39) (121/73 - 149/77)  BP(mean): --  RR: 18 (09 Oct 2024 12:39) (16 - 18)  SpO2: 99% (09 Oct 2024 12:39) (98% - 100%)    Parameters below as of 09 Oct 2024 12:39  Patient On (Oxygen Delivery Method): room air        GENERAL:  NAD, resting comfortably in bed  HEENT:  sclera icteric  RESPIRATORY:  Normal Effort  CARDIAC:  HDS  ABDOMEN:  Soft, non-tender, non-distended  SKIN:  Warm & Dry. jaundice  NEURO:  Alert, conversant, no focal deficit    LABS:                        8.5    3.78  )-----------( 226      ( 09 Oct 2024 06:29 )             24.9     Mean Cell Volume: 80.8 fl (10-09-24 @ 06:29)    10-09    131[L]  |  105  |  10  ----------------------------<  88  3.7   |  15[L]  |  0.92    Ca    8.6      09 Oct 2024 06:29  Phos  2.9     10-09  Mg     1.7     10-09    TPro  8.4[H]  /  Alb  2.5[L]  /  TBili  15.6[H]  /  DBili  x   /  AST  78[H]  /  ALT  41  /  AlkPhos  151[H]  10-09    LIVER FUNCTIONS - ( 09 Oct 2024 06:29 )  Alb: 2.5 g/dL / Pro: 8.4 g/dL / ALK PHOS: 151 U/L / ALT: 41 U/L / AST: 78 U/L / GGT: x           PT/INR - ( 09 Oct 2024 06:29 )   PT: 21.7 sec;   INR: 1.92 ratio         PTT - ( 09 Oct 2024 06:29 )  PTT:36.0 sec

## 2024-10-09 NOTE — PROGRESS NOTE ADULT - ASSESSMENT
Impression:   59 years old male with h/o chronic ETOH abuse and dependence (1 bottle of Vodka a day) with long standing hx of alcohol withdrawal and DTs with frequent hospital/ICU admissions, alcoholic gastritis/esophagitis, PUD, HLD, hx of hypoxic respiratory failure requiring intubation due to aspiration PNA (most recent intubation Aril 2020),  staph PNA, COVID-19 infection Dec 2020 presented with abd pain at the OSH.    #Acute alcohol associated hepatitis and cirrhosis   #Alcohol-related liver disease  #Hepatic steatosis  Patient with multiple hospitalizations in the past for alcohol withdrawal and DT per records. Unclear last alcohol drink. he was admitted with AMS and intubated with findings of cerebral edema and concern for GUNJAN. Now extubated on 9/23 with improvement in mental status s/p Plex x3, mannitol and hypertonic saline.    High MDF score of 49  - Calculated MELD 3.0 score 34 >>27  - Ascites: s/p paracentesis 10/1 with 800cc fluid removal, SAAG > 1.1, Total protein 2.5, negative for SBP  - Varices: no EGD on file  - HE: now improved with medical therapy   - HCC: CT without lesion (not triple phase study)    Recommendations:   - Ongoing OLT evaluation   - Dobutamine stress echo  - Hydroxyzine 25 mg TID PRN pruritis   - Cholestyramine 4 grams BID, topical hydrocortisone for 3 days.   - Rifaximin 550 mg BID, lactulose to goal 3-4 BMs per day.   - Low sodium diet  - Thiamine, FA, MV supplementation  - Alcohol cessation counseling  - Appreciate input from Psychiatry    - PT/OT/Nutrition   - Trend CBC, CMP & PT/INR daily    Mayur Castillo MD  Gastroenterology/Hepatology Fellow

## 2024-10-10 LAB
ALBUMIN SERPL ELPH-MCNC: 2.6 G/DL — LOW (ref 3.3–5)
ALP SERPL-CCNC: 150 U/L — HIGH (ref 40–120)
ALT FLD-CCNC: 43 U/L — SIGNIFICANT CHANGE UP (ref 10–45)
AMOBARBITAL UR QL SCN: NEGATIVE — SIGNIFICANT CHANGE UP
AMPHET UR-MCNC: NEGATIVE NG/ML — SIGNIFICANT CHANGE UP
ANION GAP SERPL CALC-SCNC: 11 MMOL/L — SIGNIFICANT CHANGE UP (ref 5–17)
APTT BLD: 34.9 SEC — SIGNIFICANT CHANGE UP (ref 24.5–35.6)
AST SERPL-CCNC: 87 U/L — HIGH (ref 10–40)
BARBITURATES UR QL SCN: POSITIVE
BARBITURATES UR QL SCN: SIGNIFICANT CHANGE UP NG/ML
BARBITURATES UR-MCNC: SIGNIFICANT CHANGE UP NG/ML
BASOPHILS # BLD AUTO: 0.04 K/UL — SIGNIFICANT CHANGE UP (ref 0–0.2)
BASOPHILS NFR BLD AUTO: 1 % — SIGNIFICANT CHANGE UP (ref 0–2)
BENZODIAZ UR QL SCN: NEGATIVE NG/ML — SIGNIFICANT CHANGE UP
BENZODIAZ UR-MCNC: SIGNIFICANT CHANGE UP NG/ML
BILIRUB SERPL-MCNC: 15.8 MG/DL — HIGH (ref 0.2–1.2)
BUN SERPL-MCNC: 10 MG/DL — SIGNIFICANT CHANGE UP (ref 7–23)
BUTALBITAL UR QL SCN: NEGATIVE — SIGNIFICANT CHANGE UP
CALCIUM SERPL-MCNC: 9 MG/DL — SIGNIFICANT CHANGE UP (ref 8.4–10.5)
CHLORIDE SERPL-SCNC: 102 MMOL/L — SIGNIFICANT CHANGE UP (ref 96–108)
CO2 SERPL-SCNC: 16 MMOL/L — LOW (ref 22–31)
COCAINE METAB.OTHER UR-MCNC: NEGATIVE NG/ML — SIGNIFICANT CHANGE UP
CREAT SERPL-MCNC: 0.98 MG/DL — SIGNIFICANT CHANGE UP (ref 0.5–1.3)
CREATININE, URINE THERAPEUTIC: 83.8 MG/DL — SIGNIFICANT CHANGE UP (ref 20–300)
EGFR: 90 ML/MIN/1.73M2 — SIGNIFICANT CHANGE UP
EOSINOPHIL # BLD AUTO: 0.19 K/UL — SIGNIFICANT CHANGE UP (ref 0–0.5)
EOSINOPHIL NFR BLD AUTO: 4.8 % — SIGNIFICANT CHANGE UP (ref 0–6)
FENTANYL UR QL SCN: NEGATIVE NG/ML — SIGNIFICANT CHANGE UP
GLUCOSE BLDC GLUCOMTR-MCNC: 87 MG/DL — SIGNIFICANT CHANGE UP (ref 70–99)
GLUCOSE BLDC GLUCOMTR-MCNC: 92 MG/DL — SIGNIFICANT CHANGE UP (ref 70–99)
GLUCOSE SERPL-MCNC: 77 MG/DL — SIGNIFICANT CHANGE UP (ref 70–99)
HCT VFR BLD CALC: 28 % — LOW (ref 39–50)
HGB BLD-MCNC: 9 G/DL — LOW (ref 13–17)
IMM GRANULOCYTES NFR BLD AUTO: 1 % — HIGH (ref 0–0.9)
INR BLD: 1.81 RATIO — HIGH (ref 0.85–1.16)
LYMPHOCYTES # BLD AUTO: 1.49 K/UL — SIGNIFICANT CHANGE UP (ref 1–3.3)
LYMPHOCYTES # BLD AUTO: 37.3 % — SIGNIFICANT CHANGE UP (ref 13–44)
MAGNESIUM SERPL-MCNC: 1.7 MG/DL — SIGNIFICANT CHANGE UP (ref 1.6–2.6)
MCHC RBC-ENTMCNC: 26.9 PG — LOW (ref 27–34)
MCHC RBC-ENTMCNC: 32.1 GM/DL — SIGNIFICANT CHANGE UP (ref 32–36)
MCV RBC AUTO: 83.8 FL — SIGNIFICANT CHANGE UP (ref 80–100)
METHADONE UR QL SCN: NEGATIVE NG/ML — SIGNIFICANT CHANGE UP
MONOCYTES # BLD AUTO: 0.62 K/UL — SIGNIFICANT CHANGE UP (ref 0–0.9)
MONOCYTES NFR BLD AUTO: 15.5 % — HIGH (ref 2–14)
NEUTROPHILS # BLD AUTO: 1.62 K/UL — LOW (ref 1.8–7.4)
NEUTROPHILS NFR BLD AUTO: 40.4 % — LOW (ref 43–77)
NRBC # BLD: 0 /100 WBCS — SIGNIFICANT CHANGE UP (ref 0–0)
OPIATES UR-MCNC: NEGATIVE NG/ML — SIGNIFICANT CHANGE UP
OXYCODONE UR QL SCN: NEGATIVE NG/ML — SIGNIFICANT CHANGE UP
PCP UR-MCNC: NEGATIVE NG/ML — SIGNIFICANT CHANGE UP
PENTOBARB UR QL SCN: NEGATIVE — SIGNIFICANT CHANGE UP
PH, URINE RESULT: 5.8 — SIGNIFICANT CHANGE UP (ref 4.5–8.9)
PHENOBARB UR CFM-MCNC: 2655 NG/ML — SIGNIFICANT CHANGE UP
PHENOBARBITAL RESULT, UR: POSITIVE
PHOSPHATE SERPL-MCNC: 2.8 MG/DL — SIGNIFICANT CHANGE UP (ref 2.5–4.5)
PLATELET # BLD AUTO: 280 K/UL — SIGNIFICANT CHANGE UP (ref 150–400)
POTASSIUM SERPL-MCNC: 3.9 MMOL/L — SIGNIFICANT CHANGE UP (ref 3.5–5.3)
POTASSIUM SERPL-SCNC: 3.9 MMOL/L — SIGNIFICANT CHANGE UP (ref 3.5–5.3)
PROT SERPL-MCNC: 9 G/DL — HIGH (ref 6–8.3)
PROTHROM AB SERPL-ACNC: 20.5 SEC — HIGH (ref 9.9–13.4)
RBC # BLD: 3.34 M/UL — LOW (ref 4.2–5.8)
RBC # FLD: SIGNIFICANT CHANGE UP (ref 10.3–14.5)
SECOBARBITAL UR QL SCN: NEGATIVE — SIGNIFICANT CHANGE UP
SODIUM SERPL-SCNC: 129 MMOL/L — LOW (ref 135–145)
THC UR QL: NEGATIVE NG/ML — SIGNIFICANT CHANGE UP
WBC # BLD: 4 K/UL — SIGNIFICANT CHANGE UP (ref 3.8–10.5)
WBC # FLD AUTO: 4 K/UL — SIGNIFICANT CHANGE UP (ref 3.8–10.5)

## 2024-10-10 PROCEDURE — 99223 1ST HOSP IP/OBS HIGH 75: CPT

## 2024-10-10 PROCEDURE — 99232 SBSQ HOSP IP/OBS MODERATE 35: CPT

## 2024-10-10 RX ORDER — CHOLESTYRAMINE 4 G/9G
4 POWDER, FOR SUSPENSION ORAL THREE TIMES A DAY
Refills: 0 | Status: DISCONTINUED | OUTPATIENT
Start: 2024-10-10 | End: 2024-10-11

## 2024-10-10 RX ORDER — DIPHENHYDRAMINE HCL 12.5MG/5ML
25 LIQUID (ML) ORAL ONCE
Refills: 0 | Status: COMPLETED | OUTPATIENT
Start: 2024-10-10 | End: 2024-10-10

## 2024-10-10 RX ADMIN — Medication 400 MILLIGRAM(S): at 17:33

## 2024-10-10 RX ADMIN — CHOLESTYRAMINE 4 GRAM(S): 4 POWDER, FOR SUSPENSION ORAL at 21:24

## 2024-10-10 RX ADMIN — Medication 17 GRAM(S): at 13:15

## 2024-10-10 RX ADMIN — Medication 25 MILLIGRAM(S): at 21:24

## 2024-10-10 RX ADMIN — CHLORHEXIDINE GLUCONATE ORAL RINSE 1 APPLICATION(S): 1.2 SOLUTION DENTAL at 13:11

## 2024-10-10 RX ADMIN — LACTULOSE 30 GRAM(S): 10 SOLUTION ORAL; RECTAL at 05:05

## 2024-10-10 RX ADMIN — Medication 25 MILLIGRAM(S): at 13:10

## 2024-10-10 RX ADMIN — CHOLESTYRAMINE 4 GRAM(S): 4 POWDER, FOR SUSPENSION ORAL at 09:29

## 2024-10-10 RX ADMIN — PANTOPRAZOLE SODIUM 40 MILLIGRAM(S): 40 TABLET, DELAYED RELEASE ORAL at 05:04

## 2024-10-10 RX ADMIN — Medication 25 MILLIGRAM(S): at 05:05

## 2024-10-10 RX ADMIN — CHOLESTYRAMINE 4 GRAM(S): 4 POWDER, FOR SUSPENSION ORAL at 15:17

## 2024-10-10 RX ADMIN — Medication 400 MILLIGRAM(S): at 09:29

## 2024-10-10 NOTE — CONSULT NOTE ADULT - REASON FOR ADMISSION
Acute Liver Failure

## 2024-10-10 NOTE — BH CONSULTATION LIAISON PROGRESS NOTE - NSBHFUPINTERVALHXFT_PSY_A_CORE
Patient seen at bedside, AAOx3 on exam. Labs/chart reviewed, coordination of care provided. Speaks to provider both in English and Jer, declines additional translation/  services. Patient tearful states he is concerned about pruritis and will "not drink."   Patient continues to report he was unaware of any deleterious effects of alochol consumption prior to this hospitalization, and did not believe EtOH was the problem given  States he took "23 tylenol" and other "medicines" in the remote past for pain only. Continues to deny being informed of EtOH use disorder or details about transplantation despite extensive conversation with writer about this last week. Denies any acute mood symptoms. Denies SI/HI/AVH and has no plan or intent of self-harm.
Patient seen at bedside, AAOx3 on exam. Labs/chart reviewed, coordination of care provided. Speaks to provider both in English and Jer, declines additional translation/  services. States he has felt anxious d/t severe pruritis causing discomfort. Patient continues to report he was unaware of any deleterious effects of alochol consumption and does not believe alochol is the problem. States he took "23 tylenol" and other "medicines" in the remote past for pain only. Continues to deny being informed of EtOH use disorder or details about transplantation despite extensive conversation with writer about this last week. Denies any acute mood symptoms. Denies SI/HI/AVH and has no plan or intent of self-harm.

## 2024-10-10 NOTE — CHART NOTE - NSCHARTNOTEFT_GEN_A_CORE
NUTRITION FOLLOW UP NOTE    PATIENT SEEN FOR: pre-transplant eval follow up     SOURCE: [x Patient  [x] Current Medical Record  [] RN  [x] Family/support person at bedside: daughter via phone; Spouse at bedside  [] Patient unavailable/inappropriate  [] Other:    CHART REVIEWED/EVENTS NOTED.  [x] Nutrition Status:  -MELD: 27   -Ongoing OLT eval    DIET ORDER:   Diet, Regular:   Low Sodium  Supplement Feeding Modality:  Oral  Ensure Max Cans or Servings Per Day:  1       Frequency:  Daily (10-04-24)      CURRENT DIET ORDER IS:  [x] Appropriate:    NUTRITION INTAKE/PROVISION:  [x] PO:  -Per flowsheet, % intake of meals   -Daughter endorses good PO Intake     ANTHROPOMETRICS:  Drug Dosing Weight  Height (cm): 182.9 (02 Oct 2024 09:18)  Weight (kg): 83 (02 Oct 2024 09:18)  BMI (kg/m2): 24.8 (02 Oct 2024 09:18)  BSA (m2): 2.05 (02 Oct 2024 09:18)  Weights:   Daily Weight in k.1 (10-10), Weight in k.4 (10-09), Weight in k.3 (10-06), Weight in k.5 (10-05), Weight in k.2 (10-04)     NUTRITIONALLY PERTINENT MEDICATIONS:  MEDICATIONS  (STANDING):  cholestyramine Powder (Sugar-Free)  folic acid  lactulose Syrup  magnesium oxide  pantoprazole    Tablet  polyethylene glycol 3350  rifAXIMin  thiamine       NUTRITIONALLY PERTINENT LABS:  10-10 Na129 mmol/L[L] Glu 77 mg/dL K+ 3.9 mmol/L Cr  0.98 mg/dL BUN 10 mg/dL 10-10 Phos 2.8 mg/dL 10-10 Alb 2.6 g/dL[L] 10-04 Chol 88 mg/dL LDL --    HDL 7 mg/dL[L] Trig 127 mg/dL10-10 ALT 43 U/L AST 87 U/L[H] Alkaline Phosphatase 150 U/L[H]    A1C with Estimated Average Glucose Result: 5.5 % (24 @ 11:30)  A1C with Estimated Average Glucose Result: 5.4 % (24 @ 07:05)          Finger Sticks:  POCT Blood Glucose.: 92 mg/dL (10-10 @ 12:29)  POCT Blood Glucose.: 87 mg/dL (10-10 @ 08:56)      NUTRITIONALLY PERTINENT MEDICATIONS/LABS:  [x] Reviewed  [x] Relevant notes on medications/labs:  -Hyponatremic     EDEMA:  [x] Reviewed  [] Relevant notes:    GI/ I&O:  [x] Reviewed  [] Relevant notes:  [] Other:    SKIN:   [x] No pressure injuries documented, per nursing flowsheet      ESTIMATED NEEDS:  [x] change: 83kg   Energy:  2241-2656kcal/day (27-32 kcal/kg)  Protein:   100-124g/day (1.2-1.5 g/kg)  Fluid:   ml/day or [x] defer to team  Based on: 83kg     NUTRITION DIAGNOSIS:  [x] Prior Dx  1) Increased protein-energy needs  2) Inadequate protein-energy intake - improving       EDUCATION:  [] Yes:  [x] Not appropriate/warranted    NUTRITION CARE PLAN:  1. Diet: low sodium  2. Supplements: Ensure Max x1/day  3. Multivitamin/mineral supplementation: multivitamin, folic acid, thiamine    MONITORING AND EVALUATION:   RD remains available upon request and will follow up per protocol.    Macey Huizar, MS, RDN, CDN (Teams)   Available on MS TEAMS

## 2024-10-10 NOTE — CONSULT NOTE ADULT - CONSULT REQUESTED BY NAME
MICU Team
Orthopedic Surgery
Dr. Linares
Medicine
ICU
Rasheed Gandhi
Juan Pablo Lilly
Marina
Rasheed Gandhi MD
Juan Pablo Lilly
SICU

## 2024-10-10 NOTE — PROGRESS NOTE ADULT - SUBJECTIVE AND OBJECTIVE BOX
Interval Events:   no acute events  patient tearful, upset because of pruritis and abdominal pain related to hunger  NPO for DSE today  vitals, exam and labs stable  MELD 27  [facility]  Nikia ID#[id number]    Hospital Medications:  chlorhexidine 2% Cloths 1 Application(s) Topical daily  cholestyramine Powder (Sugar-Free) 4 Gram(s) Oral two times a day  diphtheria/tetanus/pertussis (acellular) Vaccine (Adacel) 0.5 milliLiter(s) IntraMuscular once  folic acid 1 milliGRAM(s) Oral daily  hydrOXYzine hydrochloride 25 milliGRAM(s) Oral three times a day PRN  lactulose Syrup 30 Gram(s) Oral every 8 hours  magnesium oxide 400 milliGRAM(s) Oral two times a day with meals  pantoprazole    Tablet 40 milliGRAM(s) Oral before breakfast  pneumococcal  20 Vaccine (PREVNAR 20) 0.5 milliLiter(s) IntraMuscular once  polyethylene glycol 3350 17 Gram(s) Oral daily  rifAXIMin 550 milliGRAM(s) Oral two times a day  thiamine 100 milliGRAM(s) Oral daily    ROS: See above.    PHYSICAL EXAM:   Vital Signs:  Vital Signs Last 24 Hrs  T(C): 37.2 (10 Oct 2024 09:00), Max: 37.2 (10 Oct 2024 09:00)  T(F): 99 (10 Oct 2024 09:00), Max: 99 (10 Oct 2024 09:00)  HR: 60 (10 Oct 2024 09:00) (55 - 68)  BP: 145/85 (10 Oct 2024 09:00) (145/85 - 158/93)  BP(mean): --  RR: 18 (10 Oct 2024 09:00) (16 - 18)  SpO2: 100% (10 Oct 2024 09:00) (100% - 100%)    Parameters below as of 10 Oct 2024 09:00  Patient On (Oxygen Delivery Method): room air    GENERAL:  NAD, resting comfortably in bed  HEENT:  sclera icteric  RESPIRATORY:  Normal Effort  CARDIAC:  HDS  ABDOMEN:  Soft, non-tender, non-distended  SKIN:  Warm & Dry. jaundice  NEURO:  Alert, conversant, no focal deficit    LABS:                        9.0    4.00  )-----------( 280      ( 10 Oct 2024 07:25 )             28.0     Mean Cell Volume: 83.8 fl (10-10-24 @ 07:25)    10-10    129[L]  |  102  |  10  ----------------------------<  77  3.9   |  16[L]  |  0.98    Ca    9.0      10 Oct 2024 07:33  Phos  2.8     10-10  Mg     1.7     10-10    TPro  9.0[H]  /  Alb  2.6[L]  /  TBili  15.8[H]  /  DBili  x   /  AST  87[H]  /  ALT  43  /  AlkPhos  150[H]  10-10    LIVER FUNCTIONS - ( 10 Oct 2024 07:33 )  Alb: 2.6 g/dL / Pro: 9.0 g/dL / ALK PHOS: 150 U/L / ALT: 43 U/L / AST: 87 U/L / GGT: x           PT/INR - ( 10 Oct 2024 07:26 )   PT: 20.5 sec;   INR: 1.81 ratio         PTT - ( 10 Oct 2024 07:26 )  PTT:34.9 sec

## 2024-10-10 NOTE — CONSULT NOTE ADULT - PROVIDER SPECIALTY LIST ADULT
Intervent Radiology
Neurology
Hepatology
Intervent Radiology
Orthopedics
Transfusion Medicine
Cardiology
Transplant Hepatology
Transplant ID
Physiatry
Vascular Surgery

## 2024-10-10 NOTE — BH CONSULTATION LIAISON PROGRESS NOTE - CURRENT MEDICATION
MEDICATIONS  (STANDING):  chlorhexidine 2% Cloths 1 Application(s) Topical daily  cholestyramine Powder (Sugar-Free) 4 Gram(s) Oral two times a day  folic acid 1 milliGRAM(s) Oral daily  hydrocortisone 2.5% Lotion 1 Application(s) Topical two times a day  labetalol 200 milliGRAM(s) Oral three times a day  lactulose Syrup 30 Gram(s) Oral every 8 hours  magnesium oxide 400 milliGRAM(s) Oral two times a day with meals  pantoprazole    Tablet 40 milliGRAM(s) Oral before breakfast  polyethylene glycol 3350 17 Gram(s) Oral daily  rifAXIMin 550 milliGRAM(s) Oral two times a day  thiamine 100 milliGRAM(s) Oral daily    MEDICATIONS  (PRN):  hydrOXYzine hydrochloride 25 milliGRAM(s) Oral two times a day PRN Itching  
MEDICATIONS  (STANDING):  chlorhexidine 2% Cloths 1 Application(s) Topical daily  cholestyramine Powder (Sugar-Free) 4 Gram(s) Oral two times a day  diphtheria/tetanus/pertussis (acellular) Vaccine (Adacel) 0.5 milliLiter(s) IntraMuscular once  folic acid 1 milliGRAM(s) Oral daily  lactulose Syrup 30 Gram(s) Oral every 8 hours  magnesium oxide 400 milliGRAM(s) Oral two times a day with meals  pantoprazole    Tablet 40 milliGRAM(s) Oral before breakfast  pneumococcal  20 Vaccine (PREVNAR 20) 0.5 milliLiter(s) IntraMuscular once  polyethylene glycol 3350 17 Gram(s) Oral daily  rifAXIMin 550 milliGRAM(s) Oral two times a day  thiamine 100 milliGRAM(s) Oral daily    MEDICATIONS  (PRN):  hydrOXYzine hydrochloride 25 milliGRAM(s) Oral three times a day PRN Itching

## 2024-10-10 NOTE — CHART NOTE - NSCHARTNOTESELECT_GEN_ALL_CORE
Hepatology GI fellow/Event Note
Hepatology/Event Note
MAR Accept/Event Note
Nutrition Services
Nutrition Services
POCUS/Event Note
Blood Bank
EEG Prelim
EEG Prelim
EEG preliminary/Event Note
Medical Risk Assessment for Surgery/Event Note
Nutrition Services
Nutrition Services
Palliative care/Event Note
Transfer/Event Note
Vascular Surgery
Blood Bank

## 2024-10-10 NOTE — CONSULT NOTE ADULT - CONSULT REASON
Acute Liver Failure
Pretransplant cardiac evaluation prior to possible liver transplant
PLEX
Right arm hematoma Drainage
Altered mental status, abnormal imaging findings
Pre-OLT
Wrist drop
Diagnostic Paracentesis
Acute on chronic liver failure
Evaluation for dispo recs
R axillary hematoma

## 2024-10-10 NOTE — BH CONSULTATION LIAISON PROGRESS NOTE - NSBHCHARTREVIEWLAB_PSY_A_CORE FT
8.1    3.68  )-----------( 204      ( 07 Oct 2024 06:21 )             24.5     10-07    132[L]  |  105  |  11  ----------------------------<  86  3.8   |  18[L]  |  1.02    Ca    8.6      07 Oct 2024 06:21  Phos  3.0     10-07  Mg     1.5     10-07    TPro  7.7  /  Alb  2.3[L]  /  TBili  16.1[H]  /  DBili  x   /  AST  76[H]  /  ALT  42  /  AlkPhos  146[H]  10-07    
                      8.1    3.68  )-----------( 204      ( 07 Oct 2024 06:21 )             24.5     10-07    132[L]  |  105  |  11  ----------------------------<  86  3.8   |  18[L]  |  1.02    Ca    8.6      07 Oct 2024 06:21  Phos  3.0     10-07  Mg     1.5     10-07    TPro  7.7  /  Alb  2.3[L]  /  TBili  16.1[H]  /  DBili  x   /  AST  76[H]  /  ALT  42  /  AlkPhos  146[H]  10-07

## 2024-10-10 NOTE — BH CONSULTATION LIAISON PROGRESS NOTE - NSBHCHARTREVIEWINVESTIGATE_PSY_A_CORE FT
Ventricular Rate 84 BPM    Atrial Rate 84 BPM    P-R Interval 130 ms    QRS Duration 80 ms    Q-T Interval 398 ms    QTC Calculation(Bazett) 470 ms    P Axis 34 degrees    R Axis 2 degrees    T Axis 14 degrees    Diagnosis Line NORMAL SINUS RHYTHM  NONSPECIFIC T WAVE ABNORMALITY  PROLONGED QT  ABNORMAL ECG  NO PREVIOUS ECGS AVAILABLE  Confirmed by MD SAQIB, DANIEL ([id number]) on 9/26/2024 6:18:29 PM  ** consider repeat
    Ventricular Rate 84 BPM    Atrial Rate 84 BPM    P-R Interval 130 ms    QRS Duration 80 ms    Q-T Interval 398 ms    QTC Calculation(Bazett) 470 ms    P Axis 34 degrees    R Axis 2 degrees    T Axis 14 degrees    Diagnosis Line NORMAL SINUS RHYTHM  NONSPECIFIC T WAVE ABNORMALITY  PROLONGED QT  ABNORMAL ECG  NO PREVIOUS ECGS AVAILABLE  Confirmed by MD SAQIB, DANIEL ([id number]) on 9/26/2024 6:18:29 PM  ** consider repeat

## 2024-10-10 NOTE — CONSULT NOTE ADULT - SUBJECTIVE AND OBJECTIVE BOX
Patient seen and evaluated @ 9 am on   Chief Complaint: decompensated EtOH cirrhosis    HPI:  Patient is a 60 y/o male with pmh of ETOH abuse, and gastritis who initially presented to OSH due to concern of abdominal pain. Patient underwent an evaluation for acute cholecystitis and was treated for ETOH withdrawal. Patient presented with elevated LFTs that started to uptrend and patient has now been transferred to [facility] for further evaluation by the Hepatology team. Patient is currently altered and is unable to provide much collateral. Patient was treated with NAC at OSH.      (15 Sep 2024 07:47)    PMH:   Alcohol abuse    PUD (peptic ulcer disease)    Mixed hyperlipidemia    EtOH dependence    Gastritis    Substance abuse    DTs (delirium tremens)    HTN (hypertension)    Elevated lipase      PSH:   No significant past surgical history      Medications:   chlorhexidine 2% Cloths 1 Application(s) Topical daily  cholestyramine Powder (Sugar-Free) 4 Gram(s) Oral three times a day  diphtheria/tetanus/pertussis (acellular) Vaccine (Adacel) 0.5 milliLiter(s) IntraMuscular once  folic acid 1 milliGRAM(s) Oral daily  hydrOXYzine hydrochloride 25 milliGRAM(s) Oral three times a day PRN  lactulose Syrup 30 Gram(s) Oral every 8 hours  magnesium oxide 400 milliGRAM(s) Oral two times a day with meals  pantoprazole    Tablet 40 milliGRAM(s) Oral before breakfast  pneumococcal  20 Vaccine (PREVNAR 20) 0.5 milliLiter(s) IntraMuscular once  polyethylene glycol 3350 17 Gram(s) Oral daily  rifAXIMin 550 milliGRAM(s) Oral two times a day  thiamine 100 milliGRAM(s) Oral daily    Allergies:  No Known Allergies    FAMILY HISTORY:  FH: HTN (hypertension)    Social History:   Smoking:  Alcohol:  Drugs:    Review of Systems:    Constitutional: No fever, chills, fatigue, or changes in weight  HEENT: No blurry vision, eye pain, headache, runny nose, or sore throat  Respiratory: No shortness of breath, cough, or wheezing  Cardiovascular: No chest pain or palpitations  Gastrointestinal: No nausea, vomiting, diarrhea, or abdominal pain  Genitourinary: No dysuria or incontinence  Extremities: No lower extremity swelling  Neurologic: No focal findings  Lymphatic: No lymphadenopathy   Skin: No rash  Psychiatry: No anxiety or depression  10 point review of systems is otherwise negative except as mentioned above            Physical Exam:  T(C): 36.6 (10-10-24 @ 21:33), Max: 37.2 (10-10-24 @ 09:00)  HR: 71 (10-10-24 @ 21:33) (55 - 71)  BP: 156/82 (10-10-24 @ 21:33) (136/72 - 162/92)  RR: 16 (10-10-24 @ 21:33) (16 - 18)  SpO2: 100% (10-10-24 @ 21:33) (100% - 100%)  Wt(kg): --    10-09 @ :  -  10-10 @ 07:00  --------------------------------------------------------  IN: 280 mL / OUT: 200 mL / NET: 80 mL    10-10 @ 07:01  -  10-10 @ 23:17  --------------------------------------------------------  IN: 0 mL / OUT: 550 mL / NET: -550 mL      Daily     Daily Weight in k.1 (10 Oct 2024 05:37)    Appearance: +Jaundice  Eyes: + scleral icterus   HENT: Normal oral mucosa  Cardiovascular: RRR, S1, S2, no murmur, rub, or gallop; no edema; no JVD  Respiratory: Clear to auscultation bilaterally  Gastrointestinal: Soft, non-tender, non-distended, BS+  Musculoskeletal: No clubbing or joint deformity   Neurologic: No focal weakness  Lymphatic: No lymphadenopathy  Psychiatry: AAOx3 with appropriate mood and affect  Skin: No rashes, ecchymoses, or cyanosis    Cardiovascular Diagnostic Testing:  ECG: sinus 84 bpm, nonspecific ST and T abnormality    Echo:  < from: TTE W or WO Ultrasound Enhancing Agent (24 @ 10:50) >  _______________________________________________________________________________________     CONCLUSIONS:      1. Technically difficult image quality.   2. Left ventricular cavity is normal in size. Left ventricular wall thickness is normal. Left ventricular systolic function is normal with an ejection fraction visually estimated at 65 to 70 %. There are no regional wall motion abnormalities seen.   3. Normal left ventricular diastolic function, with normal left ventricular filling pressure.   4. The right ventricle is not well visualized. Normal right ventricular cavity size, with normal wall thickness, and normal right ventricular systolic function.   5. No significant valvular disease.   6. No pericardial effusion seen.    ________________________________________________________________________________________    < end of copied text >      Stress Testing:    Cath:    Interpretation of Telemetry:    Imaging:    Labs:                        9.0    4.00  )-----------( 280      ( 10 Oct 2024 07:25 )             28.0     10-10    129[L]  |  102  |  10  ----------------------------<  77  3.9   |  16[L]  |  0.98    Ca    9.0      10 Oct 2024 07:33  Phos  2.8     10-10  Mg     1.7     10-10    TPro  9.0[H]  /  Alb  2.6[L]  /  TBili  15.8[H]  /  DBili  x   /  AST  87[H]  /  ALT  43  /  AlkPhos  150[H]  10-10    PT/INR - ( 10 Oct 2024 07:26 )   PT: 20.5 sec;   INR: 1.81 ratio      PTT - ( 10 Oct 2024 07:26 )  PTT:34.9 sec    Thyroid Stimulating Hormone, Serum: 2.88 uIU/mL (10-04 @ 06:58)   0

## 2024-10-10 NOTE — BH CONSULTATION LIAISON PROGRESS NOTE - NSBHASSESSMENTFT_PSY_ALL_CORE
59 years old male with PPHx of severe EtOH use d/o with numerous ICU admissions with Phenobarbital use and DT's with PMHx of alcoholic gastritis/esophagitis, PUD, HLD, hx of hypoxic respiratory failure requiring intubation due to aspiration PNA (most recent intubation Aril 2020),  staph PNA, COVID-19 infection Dec 2020 presented with abd pain at the OSH with subsequent transfer to [facility]; for whom psychiatry was consulted for psychiatric clearance for liver transplant. Patient noted to be AAOx3 on exam.    Patient seen by  psychiatry at [facility] numerous times in setting of EtOH withdrawal and sequelae from EtOH use disorder ( 9/10/20, 10/20, 11/20, 08/21, 9/22, 7/22, 11/23, 1/24) with last admission indicative of significant behavioral disturbance despite being on phenobarbital and withdrawal treatment over a span of 11 day duration. Patient currently states he is unaware of details about liver disease and denies being informed about transplant related education. Patient minimizes EtOH use being extensive stating he consumes max of one cup or 1/2 cups of liquor daily over 25 years with unclear history of sobriety. However, EMR indicative of him reporting 2-3 bottles of vodka 1-2/day.    Plan:  **REPEAT EKG for continued use of Seroquel.  -SIPAT 53, poor candidate from psychosocial standpoint given extensive history of continued EtOH use despite ICU admissions and access to RPP/treatment services, patient is likely a high risk for relapse. Limited insight into EtOH use even during this hospitalization. Extensive discussion about transplant aftercare recommendations by primary team which patient continues to be unaware despite not being overtly encephalopathic.  -High dose thiamine supplementation, B12/Folate  -Can consider Seroquel 25 mg PO HS if Qtc < 500 for sleep architecture  -Can consider Atarax 25 mg PO BID PRN anxiety ( may help with pruritis); Hold for worsening mental status  -Acamaprosate 666mg PO TID, patient not amenable currently; declined need for RPP as well    
59 years old male with PPHx of severe EtOH use d/o with numerous ICU admissions with Phenobarbital use and DT's with PMHx of alcoholic gastritis/esophagitis, PUD, HLD, hx of hypoxic respiratory failure requiring intubation due to aspiration PNA (most recent intubation Aril 2020),  staph PNA, COVID-19 infection Dec 2020 presented with abd pain at the OSH with subsequent transfer to [facility]; for whom psychiatry was consulted for psychiatric clearance for liver transplant. Patient noted to be AAOx3 on exam.    Patient seen by  psychiatry at [facility] numerous times in setting of EtOH withdrawal and sequelae from EtOH use disorder ( 9/10/20, 10/20, 11/20, 08/21, 9/22, 7/22, 11/23, 1/24) with last admission indicative of significant behavioral disturbance despite being on phenobarbital and withdrawal treatment over a span of 11 day duration. Patient currently states he is unaware of details about liver disease and denies being informed about transplant related education. Patient minimizes EtOH use being extensive stating he consumes max of one cup or 1/2 cups of liquor daily over 25 years with unclear history of sobriety. However, EMR indicative of him reporting 2-3 bottles of vodka 1-2/day. Minimal improvement in SIPAT score from 53 to 47, patient is still deemed to be a poor candidate with limited insight into severity of EtOH use despite numerous admissions. Continues to have limited understanding of transplant related recommendations.     Plan:  **REPEAT EKG for continued use of Seroquel.  -SIPAT 47, poor candidate from psychosocial standpoint given extensive history of continued EtOH use despite ICU admissions and access to RPP/treatment services, patient is likely a high risk for relapse. Limited insight into EtOH use even during this hospitalization. Extensive discussion about transplant aftercare recommendations by primary team which patient continues to be unaware despite not being overtly encephalopathic.  -High dose thiamine supplementation, B12/Folate  -Can consider Seroquel 25 mg PO HS if Qtc < 500 for sleep architecture  -Can consider Atarax 25 mg PO BID PRN anxiety ( may help with pruritis); Hold for worsening mental status  -Acamprosate 666mg PO TID, patient not amenable currently; declined need for RPP as well

## 2024-10-10 NOTE — BH CONSULTATION LIAISON PROGRESS NOTE - NSBHCHARTREVIEWVS_PSY_A_CORE FT
Vital Signs Last 24 Hrs  T(C): 36.8 (07 Oct 2024 09:17), Max: 37.3 (06 Oct 2024 21:00)  T(F): 98.3 (07 Oct 2024 09:17), Max: 99.2 (06 Oct 2024 21:00)  HR: 73 (07 Oct 2024 09:17) (60 - 73)  BP: 116/74 (07 Oct 2024 09:17) (115/64 - 152/90)  BP(mean): --  RR: 18 (07 Oct 2024 09:17) (18 - 18)  SpO2: 97% (07 Oct 2024 09:17) (97% - 99%)    Parameters below as of 07 Oct 2024 09:17  Patient On (Oxygen Delivery Method): room air    
Vital Signs Last 24 Hrs  T(C): 37.2 (10 Oct 2024 09:00), Max: 37.2 (10 Oct 2024 09:00)  T(F): 99 (10 Oct 2024 09:00), Max: 99 (10 Oct 2024 09:00)  HR: 60 (10 Oct 2024 09:00) (55 - 68)  BP: 145/85 (10 Oct 2024 09:00) (145/85 - 158/93)  BP(mean): --  RR: 18 (10 Oct 2024 09:00) (16 - 18)  SpO2: 100% (10 Oct 2024 09:00) (100% - 100%)    Parameters below as of 10 Oct 2024 09:00  Patient On (Oxygen Delivery Method): room air

## 2024-10-10 NOTE — CONSULT NOTE ADULT - CONSULT REQUESTED DATE/TIME
04-Oct-2024 12:13
10-Oct-2024 08:00
01-Oct-2024 14:06
03-Oct-2024 11:03
15-Sep-2024 18:56
17-Sep-2024 20:00
30-Sep-2024 07:57
01-Oct-2024 16:00
02-Oct-2024 15:01
02-Oct-2024 15:59
17-Sep-2024 14:33

## 2024-10-11 ENCOUNTER — RESULT REVIEW (OUTPATIENT)
Age: 59
End: 2024-10-11

## 2024-10-11 ENCOUNTER — TRANSCRIPTION ENCOUNTER (OUTPATIENT)
Age: 59
End: 2024-10-11

## 2024-10-11 ENCOUNTER — NON-APPOINTMENT (OUTPATIENT)
Age: 59
End: 2024-10-11

## 2024-10-11 VITALS
DIASTOLIC BLOOD PRESSURE: 96 MMHG | RESPIRATION RATE: 18 BRPM | SYSTOLIC BLOOD PRESSURE: 139 MMHG | TEMPERATURE: 98 F | HEART RATE: 83 BPM | OXYGEN SATURATION: 100 %

## 2024-10-11 LAB
% ALBUMIN: 33.3 % — SIGNIFICANT CHANGE UP
% ALPHA 1: 2.7 % — SIGNIFICANT CHANGE UP
% ALPHA 2: 4 % — SIGNIFICANT CHANGE UP
% BETA: 13.9 % — SIGNIFICANT CHANGE UP
% GAMMA: 46.1 % — SIGNIFICANT CHANGE UP
% M SPIKE: SIGNIFICANT CHANGE UP
ALBUMIN SERPL ELPH-MCNC: 2.3 G/DL — LOW (ref 3.6–5.5)
ALBUMIN SERPL ELPH-MCNC: 2.5 G/DL — LOW (ref 3.3–5)
ALBUMIN/GLOB SERPL ELPH: 0.5 RATIO — SIGNIFICANT CHANGE UP
ALP SERPL-CCNC: 145 U/L — HIGH (ref 40–120)
ALPHA1 GLOB SERPL ELPH-MCNC: 0.2 G/DL — SIGNIFICANT CHANGE UP (ref 0.1–0.4)
ALPHA2 GLOB SERPL ELPH-MCNC: 0.3 G/DL — LOW (ref 0.5–1)
ALT FLD-CCNC: 45 U/L — SIGNIFICANT CHANGE UP (ref 10–45)
ANION GAP SERPL CALC-SCNC: 13 MMOL/L — SIGNIFICANT CHANGE UP (ref 5–17)
APTT BLD: 36 SEC — HIGH (ref 24.5–35.6)
AST SERPL-CCNC: 104 U/L — HIGH (ref 10–40)
B-GLOBULIN SERPL ELPH-MCNC: 1 G/DL — SIGNIFICANT CHANGE UP (ref 0.5–1)
BILIRUB SERPL-MCNC: 14.6 MG/DL — HIGH (ref 0.2–1.2)
BUN SERPL-MCNC: 9 MG/DL — SIGNIFICANT CHANGE UP (ref 7–23)
CALCIUM SERPL-MCNC: 8.7 MG/DL — SIGNIFICANT CHANGE UP (ref 8.4–10.5)
CHLORIDE SERPL-SCNC: 104 MMOL/L — SIGNIFICANT CHANGE UP (ref 96–108)
CO2 SERPL-SCNC: 15 MMOL/L — LOW (ref 22–31)
CREAT SERPL-MCNC: 0.94 MG/DL — SIGNIFICANT CHANGE UP (ref 0.5–1.3)
EGFR: 95 ML/MIN/1.73M2 — SIGNIFICANT CHANGE UP
GAMMA GLOBULIN: 3.2 G/DL — HIGH (ref 0.6–1.6)
GLUCOSE SERPL-MCNC: 82 MG/DL — SIGNIFICANT CHANGE UP (ref 70–99)
HCT VFR BLD CALC: 27.3 % — LOW (ref 39–50)
HGB BLD-MCNC: 9 G/DL — LOW (ref 13–17)
INR BLD: 1.91 RATIO — HIGH (ref 0.85–1.16)
INTERPRETATION SERPL IFE-IMP: SIGNIFICANT CHANGE UP
M-SPIKE: SIGNIFICANT CHANGE UP (ref 0–0)
MAGNESIUM SERPL-MCNC: 1.5 MG/DL — LOW (ref 1.6–2.6)
MCHC RBC-ENTMCNC: 27.4 PG — SIGNIFICANT CHANGE UP (ref 27–34)
MCHC RBC-ENTMCNC: 33 GM/DL — SIGNIFICANT CHANGE UP (ref 32–36)
MCV RBC AUTO: 83.2 FL — SIGNIFICANT CHANGE UP (ref 80–100)
NRBC # BLD: 0 /100 WBCS — SIGNIFICANT CHANGE UP (ref 0–0)
PHOSPHATE SERPL-MCNC: 2.6 MG/DL — SIGNIFICANT CHANGE UP (ref 2.5–4.5)
PLATELET # BLD AUTO: 248 K/UL — SIGNIFICANT CHANGE UP (ref 150–400)
POTASSIUM SERPL-MCNC: 4.2 MMOL/L — SIGNIFICANT CHANGE UP (ref 3.5–5.3)
POTASSIUM SERPL-SCNC: 4.2 MMOL/L — SIGNIFICANT CHANGE UP (ref 3.5–5.3)
PROT PATTERN SERPL ELPH-IMP: SIGNIFICANT CHANGE UP
PROT SERPL-MCNC: 6.9 G/DL — SIGNIFICANT CHANGE UP (ref 6–8.3)
PROT SERPL-MCNC: 9.1 G/DL — HIGH (ref 6–8.3)
PROTHROM AB SERPL-ACNC: 21.6 SEC — HIGH (ref 9.9–13.4)
RBC # BLD: 3.28 M/UL — LOW (ref 4.2–5.8)
RBC # FLD: SIGNIFICANT CHANGE UP (ref 10.3–14.5)
SODIUM SERPL-SCNC: 132 MMOL/L — LOW (ref 135–145)
WBC # BLD: 4.59 K/UL — SIGNIFICANT CHANGE UP (ref 3.8–10.5)
WBC # FLD AUTO: 4.59 K/UL — SIGNIFICANT CHANGE UP (ref 3.8–10.5)

## 2024-10-11 PROCEDURE — 86787 VARICELLA-ZOSTER ANTIBODY: CPT

## 2024-10-11 PROCEDURE — 95813 EEG EXTND MNTR 61-119 MIN: CPT

## 2024-10-11 PROCEDURE — P9045: CPT

## 2024-10-11 PROCEDURE — 86381 MITOCHONDRIAL ANTIBODY EACH: CPT

## 2024-10-11 PROCEDURE — 85027 COMPLETE CBC AUTOMATED: CPT

## 2024-10-11 PROCEDURE — 86706 HEP B SURFACE ANTIBODY: CPT

## 2024-10-11 PROCEDURE — 86780 TREPONEMA PALLIDUM: CPT

## 2024-10-11 PROCEDURE — 86480 TB TEST CELL IMMUN MEASURE: CPT

## 2024-10-11 PROCEDURE — 82390 ASSAY OF CERULOPLASMIN: CPT

## 2024-10-11 PROCEDURE — 86692 HEPATITIS DELTA AGENT ANTBDY: CPT

## 2024-10-11 PROCEDURE — 87799 DETECT AGENT NOS DNA QUANT: CPT

## 2024-10-11 PROCEDURE — 93351 STRESS TTE COMPLETE: CPT | Mod: 26

## 2024-10-11 PROCEDURE — 82962 GLUCOSE BLOOD TEST: CPT

## 2024-10-11 PROCEDURE — 86644 CMV ANTIBODY: CPT

## 2024-10-11 PROCEDURE — 71045 X-RAY EXAM CHEST 1 VIEW: CPT

## 2024-10-11 PROCEDURE — 82570 ASSAY OF URINE CREATININE: CPT

## 2024-10-11 PROCEDURE — 90677 PCV20 VACCINE IM: CPT

## 2024-10-11 PROCEDURE — 88305 TISSUE EXAM BY PATHOLOGIST: CPT

## 2024-10-11 PROCEDURE — 97166 OT EVAL MOD COMPLEX 45 MIN: CPT

## 2024-10-11 PROCEDURE — P9037: CPT

## 2024-10-11 PROCEDURE — 36430 TRANSFUSION BLD/BLD COMPNT: CPT

## 2024-10-11 PROCEDURE — 74183 MRI ABD W/O CNTR FLWD CNTR: CPT | Mod: MC

## 2024-10-11 PROCEDURE — 97530 THERAPEUTIC ACTIVITIES: CPT

## 2024-10-11 PROCEDURE — 80076 HEPATIC FUNCTION PANEL: CPT

## 2024-10-11 PROCEDURE — 87340 HEPATITIS B SURFACE AG IA: CPT

## 2024-10-11 PROCEDURE — 86923 COMPATIBILITY TEST ELECTRIC: CPT

## 2024-10-11 PROCEDURE — 86334 IMMUNOFIX E-PHORESIS SERUM: CPT

## 2024-10-11 PROCEDURE — 85018 HEMOGLOBIN: CPT

## 2024-10-11 PROCEDURE — 82550 ASSAY OF CK (CPK): CPT

## 2024-10-11 PROCEDURE — 87521 HEPATITIS C PROBE&RVRS TRNSC: CPT

## 2024-10-11 PROCEDURE — 86663 EPSTEIN-BARR ANTIBODY: CPT

## 2024-10-11 PROCEDURE — 82746 ASSAY OF FOLIC ACID SERUM: CPT

## 2024-10-11 PROCEDURE — 81001 URINALYSIS AUTO W/SCOPE: CPT

## 2024-10-11 PROCEDURE — 86850 RBC ANTIBODY SCREEN: CPT

## 2024-10-11 PROCEDURE — 85384 FIBRINOGEN ACTIVITY: CPT

## 2024-10-11 PROCEDURE — 83550 IRON BINDING TEST: CPT

## 2024-10-11 PROCEDURE — 82977 ASSAY OF GGT: CPT

## 2024-10-11 PROCEDURE — 82607 VITAMIN B-12: CPT

## 2024-10-11 PROCEDURE — 93005 ELECTROCARDIOGRAM TRACING: CPT

## 2024-10-11 PROCEDURE — 82247 BILIRUBIN TOTAL: CPT

## 2024-10-11 PROCEDURE — 76705 ECHO EXAM OF ABDOMEN: CPT

## 2024-10-11 PROCEDURE — 82803 BLOOD GASES ANY COMBINATION: CPT

## 2024-10-11 PROCEDURE — 82435 ASSAY OF BLOOD CHLORIDE: CPT

## 2024-10-11 PROCEDURE — 80202 ASSAY OF VANCOMYCIN: CPT

## 2024-10-11 PROCEDURE — 81003 URINALYSIS AUTO W/O SCOPE: CPT

## 2024-10-11 PROCEDURE — 82947 ASSAY GLUCOSE BLOOD QUANT: CPT

## 2024-10-11 PROCEDURE — 84540 ASSAY OF URINE/UREA-N: CPT

## 2024-10-11 PROCEDURE — 84155 ASSAY OF PROTEIN SERUM: CPT

## 2024-10-11 PROCEDURE — 85014 HEMATOCRIT: CPT

## 2024-10-11 PROCEDURE — 87040 BLOOD CULTURE FOR BACTERIA: CPT

## 2024-10-11 PROCEDURE — 87075 CULTR BACTERIA EXCEPT BLOOD: CPT

## 2024-10-11 PROCEDURE — 95700 EEG CONT REC W/VID EEG TECH: CPT

## 2024-10-11 PROCEDURE — 86985 SPLIT BLOOD OR PRODUCTS: CPT

## 2024-10-11 PROCEDURE — 83690 ASSAY OF LIPASE: CPT

## 2024-10-11 PROCEDURE — 82330 ASSAY OF CALCIUM: CPT

## 2024-10-11 PROCEDURE — 82533 TOTAL CORTISOL: CPT

## 2024-10-11 PROCEDURE — 87517 HEPATITIS B DNA QUANT: CPT

## 2024-10-11 PROCEDURE — 84295 ASSAY OF SERUM SODIUM: CPT

## 2024-10-11 PROCEDURE — 83540 ASSAY OF IRON: CPT

## 2024-10-11 PROCEDURE — 86038 ANTINUCLEAR ANTIBODIES: CPT

## 2024-10-11 PROCEDURE — 87641 MR-STAPH DNA AMP PROBE: CPT

## 2024-10-11 PROCEDURE — 87086 URINE CULTURE/COLONY COUNT: CPT

## 2024-10-11 PROCEDURE — 80048 BASIC METABOLIC PNL TOTAL CA: CPT

## 2024-10-11 PROCEDURE — 84436 ASSAY OF TOTAL THYROXINE: CPT

## 2024-10-11 PROCEDURE — 86735 MUMPS ANTIBODY: CPT

## 2024-10-11 PROCEDURE — 70496 CT ANGIOGRAPHY HEAD: CPT | Mod: MC

## 2024-10-11 PROCEDURE — 86664 EPSTEIN-BARR NUCLEAR ANTIGEN: CPT

## 2024-10-11 PROCEDURE — 84132 ASSAY OF SERUM POTASSIUM: CPT

## 2024-10-11 PROCEDURE — 80053 COMPREHEN METABOLIC PANEL: CPT

## 2024-10-11 PROCEDURE — 86769 SARS-COV-2 COVID-19 ANTIBODY: CPT

## 2024-10-11 PROCEDURE — 84300 ASSAY OF URINE SODIUM: CPT

## 2024-10-11 PROCEDURE — 72125 CT NECK SPINE W/O DYE: CPT | Mod: MC

## 2024-10-11 PROCEDURE — 82784 ASSAY IGA/IGD/IGG/IGM EACH: CPT

## 2024-10-11 PROCEDURE — C8930: CPT

## 2024-10-11 PROCEDURE — 86762 RUBELLA ANTIBODY: CPT

## 2024-10-11 PROCEDURE — 89051 BODY FLUID CELL COUNT: CPT

## 2024-10-11 PROCEDURE — 82565 ASSAY OF CREATININE: CPT

## 2024-10-11 PROCEDURE — 36514 APHERESIS PLASMA: CPT

## 2024-10-11 PROCEDURE — 84165 PROTEIN E-PHORESIS SERUM: CPT

## 2024-10-11 PROCEDURE — 86765 RUBEOLA ANTIBODY: CPT

## 2024-10-11 PROCEDURE — 86803 HEPATITIS C AB TEST: CPT

## 2024-10-11 PROCEDURE — 86790 VIRUS ANTIBODY NOS: CPT

## 2024-10-11 PROCEDURE — 86704 HEP B CORE ANTIBODY TOTAL: CPT

## 2024-10-11 PROCEDURE — 86965 POOLING BLOOD PLATELETS: CPT

## 2024-10-11 PROCEDURE — 97760 ORTHOTIC MGMT&TRAING 1ST ENC: CPT

## 2024-10-11 PROCEDURE — 97163 PT EVAL HIGH COMPLEX 45 MIN: CPT

## 2024-10-11 PROCEDURE — 87522 HEPATITIS C REVRS TRNSCRPJ: CPT

## 2024-10-11 PROCEDURE — 86696 HERPES SIMPLEX TYPE 2 TEST: CPT

## 2024-10-11 PROCEDURE — 87389 HIV-1 AG W/HIV-1&-2 AB AG IA: CPT

## 2024-10-11 PROCEDURE — 73201 CT UPPER EXTREMITY W/DYE: CPT | Mod: MC

## 2024-10-11 PROCEDURE — C1729: CPT

## 2024-10-11 PROCEDURE — 73030 X-RAY EXAM OF SHOULDER: CPT

## 2024-10-11 PROCEDURE — 70498 CT ANGIOGRAPHY NECK: CPT | Mod: MC

## 2024-10-11 PROCEDURE — 84443 ASSAY THYROID STIM HORMONE: CPT

## 2024-10-11 PROCEDURE — 85730 THROMBOPLASTIN TIME PARTIAL: CPT

## 2024-10-11 PROCEDURE — 87102 FUNGUS ISOLATION CULTURE: CPT

## 2024-10-11 PROCEDURE — 83735 ASSAY OF MAGNESIUM: CPT

## 2024-10-11 PROCEDURE — 83605 ASSAY OF LACTIC ACID: CPT

## 2024-10-11 PROCEDURE — P9011: CPT

## 2024-10-11 PROCEDURE — 82042 OTHER SOURCE ALBUMIN QUAN EA: CPT

## 2024-10-11 PROCEDURE — 80307 DRUG TEST PRSMV CHEM ANLYZR: CPT

## 2024-10-11 PROCEDURE — 86695 HERPES SIMPLEX TYPE 1 TEST: CPT

## 2024-10-11 PROCEDURE — 86900 BLOOD TYPING SEROLOGIC ABO: CPT

## 2024-10-11 PROCEDURE — P9100: CPT

## 2024-10-11 PROCEDURE — 97110 THERAPEUTIC EXERCISES: CPT

## 2024-10-11 PROCEDURE — 87640 STAPH A DNA AMP PROBE: CPT

## 2024-10-11 PROCEDURE — 87015 SPECIMEN INFECT AGNT CONCNTJ: CPT

## 2024-10-11 PROCEDURE — 97116 GAIT TRAINING THERAPY: CPT

## 2024-10-11 PROCEDURE — 87476 LYME DIS DNA AMP PROBE: CPT

## 2024-10-11 PROCEDURE — 97129 THER IVNTJ 1ST 15 MIN: CPT

## 2024-10-11 PROCEDURE — 49083 ABD PARACENTESIS W/IMAGING: CPT

## 2024-10-11 PROCEDURE — 84439 ASSAY OF FREE THYROXINE: CPT

## 2024-10-11 PROCEDURE — 86682 HELMINTH ANTIBODY: CPT

## 2024-10-11 PROCEDURE — 86255 FLUORESCENT ANTIBODY SCREEN: CPT

## 2024-10-11 PROCEDURE — 86789 WEST NILE VIRUS ANTIBODY: CPT

## 2024-10-11 PROCEDURE — 86708 HEPATITIS A ANTIBODY: CPT

## 2024-10-11 PROCEDURE — 82728 ASSAY OF FERRITIN: CPT

## 2024-10-11 PROCEDURE — 70553 MRI BRAIN STEM W/O & W/DYE: CPT | Mod: MC

## 2024-10-11 PROCEDURE — 36415 COLL VENOUS BLD VENIPUNCTURE: CPT

## 2024-10-11 PROCEDURE — A9585: CPT

## 2024-10-11 PROCEDURE — 86618 LYME DISEASE ANTIBODY: CPT

## 2024-10-11 PROCEDURE — 84550 ASSAY OF BLOOD/URIC ACID: CPT

## 2024-10-11 PROCEDURE — 82107 ALPHA-FETOPROTEIN L3: CPT

## 2024-10-11 PROCEDURE — 82104 ALPHA-1-ANTITRYPSIN PHENO: CPT

## 2024-10-11 PROCEDURE — 93306 TTE W/DOPPLER COMPLETE: CPT

## 2024-10-11 PROCEDURE — 82140 ASSAY OF AMMONIA: CPT

## 2024-10-11 PROCEDURE — 99232 SBSQ HOSP IP/OBS MODERATE 35: CPT

## 2024-10-11 PROCEDURE — 87070 CULTURE OTHR SPECIMN AEROBIC: CPT

## 2024-10-11 PROCEDURE — 80061 LIPID PANEL: CPT

## 2024-10-11 PROCEDURE — 82945 GLUCOSE OTHER FLUID: CPT

## 2024-10-11 PROCEDURE — 95711 VEEG 2-12 HR UNMONITORED: CPT

## 2024-10-11 PROCEDURE — 84207 ASSAY OF VITAMIN B-6: CPT

## 2024-10-11 PROCEDURE — 94002 VENT MGMT INPAT INIT DAY: CPT

## 2024-10-11 PROCEDURE — 85025 COMPLETE CBC W/AUTO DIFF WBC: CPT

## 2024-10-11 PROCEDURE — 82103 ALPHA-1-ANTITRYPSIN TOTAL: CPT

## 2024-10-11 PROCEDURE — 94003 VENT MGMT INPAT SUBQ DAY: CPT

## 2024-10-11 PROCEDURE — 74177 CT ABD & PELVIS W/CONTRAST: CPT | Mod: MC

## 2024-10-11 PROCEDURE — 86901 BLOOD TYPING SEROLOGIC RH(D): CPT

## 2024-10-11 PROCEDURE — 88112 CYTOPATH CELL ENHANCE TECH: CPT

## 2024-10-11 PROCEDURE — P9012: CPT

## 2024-10-11 PROCEDURE — 84153 ASSAY OF PSA TOTAL: CPT

## 2024-10-11 PROCEDURE — 85610 PROTHROMBIN TIME: CPT

## 2024-10-11 PROCEDURE — P9059: CPT

## 2024-10-11 PROCEDURE — 85379 FIBRIN DEGRADATION QUANT: CPT

## 2024-10-11 PROCEDURE — P9016: CPT

## 2024-10-11 PROCEDURE — 84100 ASSAY OF PHOSPHORUS: CPT

## 2024-10-11 PROCEDURE — 85396 CLOTTING ASSAY WHOLE BLOOD: CPT

## 2024-10-11 PROCEDURE — 93971 EXTREMITY STUDY: CPT

## 2024-10-11 PROCEDURE — 93970 EXTREMITY STUDY: CPT

## 2024-10-11 PROCEDURE — 83615 LACTATE (LD) (LDH) ENZYME: CPT

## 2024-10-11 PROCEDURE — G0480: CPT

## 2024-10-11 PROCEDURE — 82248 BILIRUBIN DIRECT: CPT

## 2024-10-11 PROCEDURE — 86665 EPSTEIN-BARR CAPSID VCA: CPT

## 2024-10-11 PROCEDURE — 86788 WEST NILE VIRUS AB IGM: CPT

## 2024-10-11 PROCEDURE — 70450 CT HEAD/BRAIN W/O DYE: CPT | Mod: MC

## 2024-10-11 PROCEDURE — 87205 SMEAR GRAM STAIN: CPT

## 2024-10-11 PROCEDURE — 86777 TOXOPLASMA ANTIBODY: CPT

## 2024-10-11 PROCEDURE — 84157 ASSAY OF PROTEIN OTHER: CPT

## 2024-10-11 PROCEDURE — 86376 MICROSOMAL ANTIBODY EACH: CPT

## 2024-10-11 RX ORDER — MAGNESIUM SULFATE 500 MG/ML
2 VIAL (ML) INJECTION ONCE
Refills: 0 | Status: COMPLETED | OUTPATIENT
Start: 2024-10-11 | End: 2024-10-11

## 2024-10-11 RX ORDER — PANTOPRAZOLE SODIUM 40 MG/1
1 TABLET, DELAYED RELEASE ORAL
Qty: 30 | Refills: 0
Start: 2024-10-11 | End: 2024-11-09

## 2024-10-11 RX ORDER — CHOLESTYRAMINE 4 G/9G
1 POWDER, FOR SUSPENSION ORAL
Qty: 360 | Refills: 0
Start: 2024-10-11 | End: 2024-11-09

## 2024-10-11 RX ORDER — PSYLLIUM HUSK 0.4 G
1 CAPSULE ORAL
Qty: 60 | Refills: 1
Start: 2024-10-11 | End: 2024-12-09

## 2024-10-11 RX ORDER — LACTULOSE 10 G/15ML
45 SOLUTION ORAL; RECTAL
Qty: 4050 | Refills: 0
Start: 2024-10-11 | End: 2024-11-09

## 2024-10-11 RX ORDER — RIFAXIMIN 550 MG/1
1 TABLET ORAL
Qty: 60 | Refills: 0
Start: 2024-10-11 | End: 2024-11-09

## 2024-10-11 RX ORDER — HYDROXYZINE PAMOATE 50 MG
1 CAPSULE ORAL
Qty: 45 | Refills: 0
Start: 2024-10-11 | End: 2024-10-25

## 2024-10-11 RX ADMIN — PANTOPRAZOLE SODIUM 40 MILLIGRAM(S): 40 TABLET, DELAYED RELEASE ORAL at 05:04

## 2024-10-11 RX ADMIN — Medication 25 MILLIGRAM(S): at 01:15

## 2024-10-11 RX ADMIN — Medication 17 GRAM(S): at 11:52

## 2024-10-11 RX ADMIN — LACTULOSE 30 GRAM(S): 10 SOLUTION ORAL; RECTAL at 05:04

## 2024-10-11 RX ADMIN — THIAMINE HYDROCHLORIDE 100 MILLIGRAM(S): 100 INJECTION, SOLUTION INTRAMUSCULAR; INTRAVENOUS at 11:45

## 2024-10-11 RX ADMIN — Medication 25 MILLIGRAM(S): at 17:35

## 2024-10-11 RX ADMIN — LACTULOSE 30 GRAM(S): 10 SOLUTION ORAL; RECTAL at 13:16

## 2024-10-11 RX ADMIN — FOLIC ACID 1 MILLIGRAM(S): 1 TABLET ORAL at 11:46

## 2024-10-11 RX ADMIN — Medication 400 MILLIGRAM(S): at 17:07

## 2024-10-11 RX ADMIN — Medication 25 GRAM(S): at 10:08

## 2024-10-11 RX ADMIN — CHLORHEXIDINE GLUCONATE ORAL RINSE 1 APPLICATION(S): 1.2 SOLUTION DENTAL at 11:52

## 2024-10-11 RX ADMIN — Medication 400 MILLIGRAM(S): at 07:47

## 2024-10-11 NOTE — PROGRESS NOTE ADULT - SUBJECTIVE AND OBJECTIVE BOX
Transplant ID follow up    sitting in chair,  afebrile  Complaining of itching, no diarrhea, no cough, SOB      BCx (9/14, 9/19, 9/30, 10/1) No Growth To Date  UCx (10/1) Negative  Sputum Culture (9/20, 9/22) Normal Respiratory Melanie  MRSA Nasal PCR (9/19, 9/27) + MRSA    Paracentesis (10/1) 293 nucleated cells with 49% PMN    CT A/P (9/30) Diffuse small bowel ileus versus low-grade small bowel obstruction with tapering transition point  at the distal ileum just proximal to the ileocecal junction. Few right lower lobe patchy opacities, may be sequelae of infection versus inflammation.    DVT US RUE (9/24) Superficial thrombus noted in the right basilic vein. 4.2 x 4.3 x 6.4 cm avascular complex collection in the right axillary fossa may epresent a hematoma.    CT RUE (9/30) Heterogeneous nonsimple collection within the soft tissues of the right axilla, which extends into the proximal to mid triceps musculature and may extend into the proximal biceps muscle, with mixed internal attenuation. Collection envelops portions of the right axillary neurovascular bundle. It is unclear if this collection communicates with the glenohumeral joint space    Antibiotic Course:   Ceftriaxone: 9/16 --> 9/20  Zosyn: 9/21 --> 9/27  Vancomycin: 9/20 --> 9/22              Vital Signs Last 24 Hrs  T(C): 36.7 (11 Oct 2024 09:00), Max: 37.5 (11 Oct 2024 01:00)  T(F): 98.1 (11 Oct 2024 09:00), Max: 99.5 (11 Oct 2024 01:00)  HR: 56 (11 Oct 2024 09:00) (55 - 72)  BP: 143/82 (11 Oct 2024 09:00) (124/64 - 162/92)  BP(mean): --  RR: 18 (11 Oct 2024 09:00) (14 - 18)  SpO2: 100% (11 Oct 2024 09:00) (98% - 100%)    Parameters below as of 11 Oct 2024 09:00  Patient On (Oxygen Delivery Method): room air      PHYSICAL EXAMINATION:  General: Alert and Awake, NAD  HEENT: PERRL, EOMI, No subconjunctival hemorrhages, Oropharynx Clear, MMM  Neck: Supple, No GRICEL  Cardiac: RRR, No M/R/G  Resp: CTAB, No Wh/Rh/Ra  Abdomen: NBS, NT/ND, No HSM, No rigidity or guarding  MSK: Edema of the RUE with limited ROM.  : No saxena  Skin: No rashes or lesions. Skin is warm and dry to the touch.   Neuro: Alert and Awake. CN 2-12 Grossly intact. Moves all four extremities spontaneously.  Psych: Calm, Pleasant, Cooperative      ____________________________________________________  ROS  GENERAL: denies chills, , night sweats, weight loss.   PSYCH: denies depression, anxiety, suicidal ideation, hallucination, and delusions  SKIN: no rash or lesions; no color changes, no abnormal nevi,no  dryness, and nojaundice    EYES: denies visual changes, floaters, pain, inflammation, blurred vision, and discharge  ENT: denies tinnitus, vertigo, epistaxis, oral lesion, and decreased acuity  PULM: denies, hemoptysis, pleurisy  CVS: denies angina, palpitations,+ orthopnea, no syncope, or heart murmur  GI: denies constipation, diarrhea, melena, abdominal pain, nausea.   : denies dysuria, frequency, discharge, incontinence, stones or macroscopic hematuria  MS: no arthralgias, no erythema or swelling, no myalgias, noedema, or lower back pain.   CNS: denies numbness, dizziness, seizure, or tremor  ENDO: denies heat/cold intolerance, polyuria, polydipsia, malaise.    HEME: denies bruising, bleeding, lymphadenopathy, anemia, and calf pain    Allergies  No Known Allergies    __________________________________________________  MEDS:  MEDICATIONS  (STANDING):  cholestyramine Powder (Sugar-Free) 4 three times a day  diphtheria/tetanus/pertussis (acellular) Vaccine (Adacel) 0.5 once  hydrOXYzine hydrochloride 25 three times a day PRN  lactulose Syrup 30 every 8 hours  pantoprazole    Tablet 40 before breakfast  pneumococcal  20 Vaccine (PREVNAR 20) 0.5 once  polyethylene glycol 3350 17 daily    _________________________________________________  ANTIMICOBIALS  diphtheria/tetanus/pertussis (acellular) Vaccine (Adacel) 0.5 once  pneumococcal  20 Vaccine (PREVNAR 20) 0.5 once  rifAXIMin 550 two times a day      GENERAL LABS              9.0                  132  | 15   | 9            4.59  >-----------< 248     ------------------------< 82                    27.3                 4.2  | 104  | 0.94                                         Ca 8.7   Mg 1.5   Ph 2.6      Vancomycin Level, Trough: 11.6 (09-21 @ 17:00)    Urinalysis Basic - ( 11 Oct 2024 06:28 )    Color: x / Appearance: x / SG: x / pH: x  Gluc: 82 mg/dL / Ketone: x  / Bili: x / Urobili: x   Blood: x / Protein: x / Nitrite: x   Leuk Esterase: x / RBC: x / WBC x   Sq Epi: x / Non Sq Epi: x / Bacteria: x        _________________________________________________  MICROBIOLOGY  -----------                  Fungitell:   _______________________________________________  PERTINENT IMAGING      Partially imaged abdominal ascites. Please see separate same day   abdomen/pelvis CT dictation for findings within the abdomen/pelvis.    < end of copied text >

## 2024-10-11 NOTE — DISCHARGE NOTE NURSING/CASE MANAGEMENT/SOCIAL WORK - NSDCVIVACCINE_GEN_ALL_CORE_FT
COVID-19, mRNA, LNP-S, PF, 100 mcg/ 0.5 mL dose (Moderna); 10-Jovan-2021 15:38; Reji Hernandez (RN); Moderna [employer] [id number] (Exp. Date: 13-Jul-2021); IntraMuscular; Deltoid Right.; 0.5 milliLiter(s);   influenza, injectable, quadrivalent, preservative free; 31-Jan-2019 15:53; Ayaka Bynum (RN); [employer]; [id number] (Exp. Date: 30-Jun-2019); IntraMuscular; Deltoid Right.; 0.5 milliLiter(s); VIS (VIS Published: 07-Aug-2015, VIS Presented: 31-Jan-2019);   influenza, injectable, quadrivalent, preservative free; 10-Nov-2019 14:13; Laverne Lane (RN); [employer]; [id number] (Exp. Date: 30-Jun-2020); IntraMuscular; Deltoid Left.; 0.5 milliLiter(s); VIS (VIS Published: 15-Aug-2019, VIS Presented: 10-Nov-2019);   influenza, injectable, quadrivalent, preservative free; 13-Oct-2020 11:56; Kimberli Cronin (RN); [employer]; [id number] (Exp. Date: 30-Jun-2021); IntraMuscular; Deltoid Right.; 0.5 milliLiter(s); VIS (VIS Published: 15-Aug-2019, VIS Presented: 13-Oct-2020);   Pneumococcal conjugate vaccine 20-valent (PCV20), polysaccharide [id number] conjugate, adjuvant, preserv; 04-Oct-2024 21:44; Colby Mckeon (RN); [employer]; [id number] (Exp. Date: 04-May-2025); IntraMuscular; Deltoid Left.; 0.5 milliLiter(s); VIS (VIS Published: 12-May-2023, VIS Presented: 04-Oct-2024);   Td (adult) preservative free; 18-Jan-2020 20:04; Yulia Vance (RN); [employer]; [id number] (Exp. Date: 19-Jan-2021); IntraMuscular; Deltoid Right.; 0.5 milliLiter(s); VIS (VIS Published: 18-Jan-2020, VIS Presented: 18-Jan-2020);

## 2024-10-11 NOTE — PROGRESS NOTE ADULT - TIME BILLING
face-to-face encounter, review of extensive medical records in this and prior charts, laboratory findings, radiographic and microbiology results; documentation as noted above and discussion of diagnostic impressions and plan with the patient and team
As above. Time spent coordinating care.
Time-based billing (NON-critical care).     The necessity of the time spent during the encounter on this date of service was due to:     - Ordering, reviewing, and interpreting labs, testing, and imaging.  - Independently obtaining a review of systems and performing a physical exam  - Reviewing prior hospitalization and where necessary, outpatient records.  - Counselling and educating patient  regarding interpretation of aforementioned items and plan of care.
Time-based billing (NON-critical care).     The necessity of the time spent during the encounter on this date of service was due to:     - Ordering, reviewing, and interpreting labs, testing, and imaging.  - Independently obtaining a review of systems and performing a physical exam  - Reviewing prior hospitalization and where necessary, outpatient records.  - Counselling and educating patient  regarding interpretation of aforementioned items and plan of care.
Chart review, exam, documentation, orders, d/w consultants.

## 2024-10-11 NOTE — PROGRESS NOTE ADULT - SUBJECTIVE AND OBJECTIVE BOX
Interval Events:   no acute events  pruritis is improved. patient able to sleep well  MELD 27    Hospital Medications:  chlorhexidine 2% Cloths 1 Application(s) Topical daily  cholestyramine Powder (Sugar-Free) 4 Gram(s) Oral three times a day  diphtheria/tetanus/pertussis (acellular) Vaccine (Adacel) 0.5 milliLiter(s) IntraMuscular once  folic acid 1 milliGRAM(s) Oral daily  hydrOXYzine hydrochloride 25 milliGRAM(s) Oral three times a day PRN  lactulose Syrup 30 Gram(s) Oral every 8 hours  magnesium oxide 400 milliGRAM(s) Oral two times a day with meals  pantoprazole    Tablet 40 milliGRAM(s) Oral before breakfast  pneumococcal  20 Vaccine (PREVNAR 20) 0.5 milliLiter(s) IntraMuscular once  polyethylene glycol 3350 17 Gram(s) Oral daily  rifAXIMin 550 milliGRAM(s) Oral two times a day  thiamine 100 milliGRAM(s) Oral daily      ROS: See above.    PHYSICAL EXAM:   Vital Signs:  Vital Signs Last 24 Hrs  T(C): 36.7 (11 Oct 2024 09:00), Max: 37.5 (11 Oct 2024 01:00)  T(F): 98.1 (11 Oct 2024 09:00), Max: 99.5 (11 Oct 2024 01:00)  HR: 56 (11 Oct 2024 09:00) (55 - 72)  BP: 143/82 (11 Oct 2024 09:00) (124/64 - 162/92)  BP(mean): --  RR: 18 (11 Oct 2024 09:00) (14 - 18)  SpO2: 100% (11 Oct 2024 09:00) (98% - 100%)    Parameters below as of 11 Oct 2024 09:00  Patient On (Oxygen Delivery Method): room air        GENERAL:  NAD, resting comfortably in bed  HEENT:  sclera icteric  RESPIRATORY:  Normal Effort  CARDIAC:  HDS  ABDOMEN:  Soft, non-tender, non-distended  SKIN:  Warm & Dry. jaundice  NEURO:  Alert, conversant, no focal deficit    LABS:                        9.0    4.59  )-----------( 248      ( 11 Oct 2024 06:28 )             27.3     Mean Cell Volume: 83.2 fl (10-11-24 @ 06:28)    10-11    132[L]  |  104  |  9   ----------------------------<  82  4.2   |  15[L]  |  0.94    Ca    8.7      11 Oct 2024 06:28  Phos  2.6     10-11  Mg     1.5     10-11    TPro  9.1[H]  /  Alb  2.5[L]  /  TBili  14.6[H]  /  DBili  x   /  AST  104[H]  /  ALT  45  /  AlkPhos  145[H]  10-11    LIVER FUNCTIONS - ( 11 Oct 2024 06:28 )  Alb: 2.5 g/dL / Pro: 9.1 g/dL / ALK PHOS: 145 U/L / ALT: 45 U/L / AST: 104 U/L / GGT: x           PT/INR - ( 11 Oct 2024 06:28 )   PT: 21.6 sec;   INR: 1.91 ratio         PTT - ( 11 Oct 2024 06:28 )  PTT:36.0 sec

## 2024-10-11 NOTE — PROGRESS NOTE ADULT - ASSESSMENT
59 years old male with h/o chronic ETOH abuse and dependence (1 bottle of Vodka a day) with long standing hx of alcohol withdrawal and DTs with frequent hospital/ICU admissions, alcoholic gastritis/esophagitis, PUD, HLD who presented with abdominal pain and encephalopathy. Intubated 9/17 with findings of cerebral edema and concern for CHCF. Now extubated on 9/23 with improvement in mental status s/p Plex x3, mannitol and hypertonic saline.       BCx (9/14, 9/19, 9/30, 10/1) No Growth To Date  UCx (10/1) Negative  Sputum Culture (9/20, 9/22) Normal Respiratory Melanie  MRSA Nasal PCR (9/19, 9/27) + MRSA    Paracentesis (10/1) 293 nucleated cells with 49% PMN    CT A/P (9/30) Diffuse small bowel ileus versus low-grade small bowel obstruction with tapering transition point  at the distal ileum just proximal to the ileocecal junction. Few right lower lobe patchy opacities, may be sequelae of infection versus inflammation.    DVT US RUE (9/24) Superficial thrombus noted in the right basilic vein. 4.2 x 4.3 x 6.4 cm avascular complex collection in the right axillary fossa may epresent a hematoma.    CT RUE (9/30) Heterogeneous nonsimple collection within the soft tissues of the right axilla, which extends into the proximal to mid triceps musculature and may extend into the proximal biceps muscle, with mixed internal attenuation. Collection envelops portions of the right axillary neurovascular bundle. It is unclear if this collection communicates with the glenohumeral joint space      # patchy pulmonary infiltrates  # MRSA positive nare swab but clinically no concern for MRSA pneumonia  completed zosyn course fo presumed pneumonia has been on RA, not coughing, so favor continue holding antibitoics.  findings c/f fluid rather than bacterial pneuonia  Antibiotic Course:   Ceftriaxone: 9/16 --> 9/20  Zosyn: 9/21 --> 9/27  Vancomycin: 9/20 --> 9/22      #Pre-Liver Transplant Evaluation, Decompensated Cirrhosis  COVID19 Victor Hugo Antibody Pending  HAV IgG pos  HBVs Ab positive, needs surface quantitative antibody  HBVsAg Negative  HBVc Ab Negative  HCV Ab neg, Hepatitis D Ab, Hep E Ab IgN beg  HSV 1 IgG po  HSV 2 IgG neg  EBV IgG Pos  CMV IgG pos  VZV IgG pos  Measles IgG pos  Mumps IgG pos  Rubella IgG pos  Quantiferon Gold neg  HIV Ag/Ab by CMIA Negative  Syphilis Screen neg  toxoplasma IgG neg  Strongyloides Ab neg  --Will check Leishmania Ab- need to have this ordered     #Encounter to Vaccinate Patient- still ok with getting more vaccines despite thinking prevnar cause itching  COVID19: Would benefit from COVID19 [id number] Vaccine Dose  Influenza: Will require- please order pror to discharge  Pneumococcal: received prevnar-   HAV and HBV immune no vaccine needed    Shingles: Will require Shingrix  Tdap: Tdap 1/18/2020          Thank you for involving us in the care of this patient  Transplant ID will sign off , currently declined with reevaluation outpatient for transplant   Please call or page with additional questions  Pager; #[phone]  Teams: from 8 am to 5 pm  Estella Pink MD

## 2024-10-11 NOTE — DISCHARGE NOTE NURSING/CASE MANAGEMENT/SOCIAL WORK - NSDCPEFALRISK_GEN_ALL_CORE
For information on Fall & Injury Prevention, visit: [email or web address] OR  [email or web address] OR  https://www.cdc.gov/steadi/patient.html

## 2024-10-11 NOTE — PROGRESS NOTE ADULT - ASSESSMENT
Impression:   59 years old male with h/o chronic ETOH abuse and dependence (1 bottle of Vodka a day) with long standing hx of alcohol withdrawal and DTs with frequent hospital/ICU admissions, alcoholic gastritis/esophagitis, PUD, HLD, hx of hypoxic respiratory failure requiring intubation due to aspiration PNA (most recent intubation Aril 2020),  staph PNA, COVID-19 infection Dec 2020 presented with abd pain at the OSH.    #Acute alcohol associated hepatitis and cirrhosis   #Alcohol-related liver disease  #Hepatic steatosis  Patient with multiple hospitalizations in the past for alcohol withdrawal and DT per records. Unclear last alcohol drink. he was admitted with AMS and intubated with findings of cerebral edema and concern for GUNJAN. Now extubated on 9/23 with improvement in mental status s/p Plex x3, mannitol and hypertonic saline.    High MDF score of 49  - Calculated MELD 3.0 score 34 >>27 (10/11). Blood type A  - Ascites: s/p paracentesis 10/1 with 800cc fluid removal, SAAG > 1.1, Total protein 2.5, negative for SBP  - Varices: no EGD on file  - HE: now improved with medical therapy   - HCC: triple phase CT (9/30) without lesion     Recommendations:   - Ongoing OLT evaluation - to discuss at committee today  - Dobutamine stress echo today  - Hydroxyzine 25 mg TID PRN pruritis   - Cholestyramine 4 grams TID   - Rifaximin 550 mg BID, lactulose to goal 3-4 BMs per day.   - Low sodium diet  - Thiamine, FA, MV supplementation  - Alcohol cessation counseling  - Appreciate input from Psychiatry    - PT/OT/Nutrition   - Trend CBC, CMP & PT/INR daily    Mayur Castillo MD  Gastroenterology/Hepatology Fellow

## 2024-10-11 NOTE — PROGRESS NOTE ADULT - REASON FOR ADMISSION
Acute Liver Failure

## 2024-10-11 NOTE — DISCHARGE NOTE NURSING/CASE MANAGEMENT/SOCIAL WORK - PATIENT PORTAL LINK FT
You can access the FollowMyHealth Patient Portal offered by [facility] by registering at the following website: [email or web address]. By joining [employer]’s FollowMyHealth portal, you will also be able to view your health information using other applications (apps) compatible with our system.

## 2024-10-14 DIAGNOSIS — K70.30 ALCOHOLIC CIRRHOSIS OF LIVER W/OUT ASCITES: ICD-10-CM

## 2024-10-16 NOTE — ED ADULT NURSE NOTE - NS ED NURSE IV DC DT
Patient Seen in: [facility] Emergency Department      History     Chief Complaint   Patient presents with    Other     Stated Complaint: packing removal    Subjective:   HPI      41-year-old female presenting for evaluation of a wound check.  Seen in the ER in 10-13 for abscess to posterior left arm which underwent I&D and packing.  She is on oral antibiotics.  She states is improving but still draining.  Has scheduled follow-up with dermatology.    Objective:     Past Medical History:    Anxiety    Decorative tattoo    Diabetes (HCC)    Human papilloma virus infection    No diabetic retinopathy in both eyes              Past Surgical History:   Procedure Laterality Date    Colposcopy,bx cervix/endocerv curr                  Social History     Socioeconomic History    Marital status:    Tobacco Use    Smoking status: Former     Types: Cigarettes    Smokeless tobacco: Never    Tobacco comments:     Quit 2011   Vaping Use    Vaping status: Never Used   Substance and Sexual Activity    Alcohol use: Not Currently    Drug use: No    Sexual activity: Yes   Other Topics Concern    Caffeine Concern No    Exercise Yes    Seat Belt Yes    Special Diet No    Stress Concern No    Weight Concern No   Social History Narrative    ** Merged History Encounter **                       Physical Exam     ED Triage Vitals [10/15/24 1522]   /77   Pulse 82   Resp 16   Temp 97.3 °F (36.3 °C)   Temp src Temporal   SpO2 97 %   O2 Device None (Room air)       Current Vitals:   Vital Signs  BP: 102/70  Pulse: 80  Resp: 18  Temp: 97.3 °F (36.3 °C)  Temp src: Temporal    Oxygen Therapy  SpO2: 99 %  O2 Device: None (Room air)        Physical Exam  Constitutional: awake, alert, no sig distress  HENT: mmm, no lesions,  Neck: normal range of motion, no tenderness, supple.  Eyes: PERRL, EOMI, conjunctiva normal, no discharge. Sclera anicteric.  Cardiovascular: rr no murmur  Respiratory: Normal breath sounds, no respiratory  distress, no wheezing, no chest tenderness.  GI: Bowel sounds normal, Soft, no tenderness, no masses, no pulsatile masses.  : No CVA tenderness.  Skin: Abscess cavity present to left upper arm with packing, there is mild amount of purulent drainage - no erythema  Musculoskeletal: Intact distal pulses, no edema, no tenderness, no cyanosis, no clubbing. Good range of motion in all major joints. No tenderness to palpation or major deformities noted. Back- No tenderness.  Neurologic: Alert & oriented x 3, normal motor function, normal sensory function, no focal deficits noted.  Psych: Calm, cooperative, nl affect a  ED Course   Labs Reviewed - No data to display                MDM      41F hx as above who presents for wound check. On arrival vss, reassuring  Clincally is improving after I&D and oral abx. Does not appear septic or systemically ill. Seems appropriate for packing removal. Dressed with dry gauze.     Discussed return precautions and follow up instructions with patient who voiced understanding and agreement with the plan. All questions answered.           Medical Decision Making      Disposition and Plan     Clinical Impression:  1. Wound check, abscess         Disposition:  Discharge  10/15/2024  4:09 pm    Follow-up:  Mikhail Zamorano MD  [address]  [facility] [address]  [facility] [id number] [phone]    Follow up      [facility]  [address]  [facility] [id number] [phone]  Follow up  As needed, If symptoms worsen          Medications Prescribed:  Discharge Medication List as of 10/15/2024  4:09 PM              Supplementary Documentation:                                                          19-Mar-2020 09:25

## 2024-10-17 NOTE — PATIENT PROFILE ADULT - PATIENT'S SEXUAL ORIENTATION
orange, red, or yellow fruits and vegetables are especially good for you. Examples include spinach, carrots, peaches, and berries.     Foods high in fiber can reduce your cholesterol and provide important vitamins and minerals. High-fiber foods include whole-grain cereals and breads, oatmeal, beans, brown rice, citrus fruits, and apples.     Eat lean proteins. Heart-healthy proteins include seafood, lean meats and poultry, eggs, beans, peas, nuts, seeds, and soy products.     Limit drinks and foods with added sugar. These include candy, desserts, and soda pop.   Heart-healthy lifestyle    If your doctor recommends it, get more exercise. For many people, walking is a good choice. Or you may want to swim, bike, or do other activities. Bit by bit, increase the time you're active every day. Try for at least 30 minutes on most days of the week.     Try to quit or cut back on using tobacco and other nicotine products. This includes smoking and vaping. If you need help quitting, talk to your doctor about stop-smoking programs and medicines. These can increase your chances of quitting for good. Quitting is one of the most important things you can do to protect your heart. It is never too late to quit. Try to avoid secondhand smoke too.     Stay at a weight that's healthy for you. Talk to your doctor if you need help losing weight.     Try to get 7 to 9 hours of sleep each night.     Limit alcohol to 2 drinks a day for men and 1 drink a day for women. Too much alcohol can cause health problems.     Manage other health problems such as diabetes, high blood pressure, and high cholesterol. If you think you may have a problem with alcohol or drug use, talk to your doctor.   Medicines    Take your medicines exactly as prescribed. Call your doctor if you think you are having a problem with your medicine.     If your doctor recommends aspirin, take the amount directed each day. Make sure you take aspirin and not another kind of  Heterosexual

## 2024-10-20 ENCOUNTER — INPATIENT (INPATIENT)
Facility: HOSPITAL | Age: 59
LOS: 2 days | Discharge: HOME | DRG: 951 | End: 2024-10-23
Attending: HOSPITALIST | Admitting: STUDENT IN AN ORGANIZED HEALTH CARE EDUCATION/TRAINING PROGRAM
Payer: MEDICAID

## 2024-10-20 VITALS
RESPIRATION RATE: 16 BRPM | SYSTOLIC BLOOD PRESSURE: 144 MMHG | WEIGHT: 171.96 LBS | HEIGHT: 72 IN | HEART RATE: 91 BPM | OXYGEN SATURATION: 100 % | TEMPERATURE: 98 F | DIASTOLIC BLOOD PRESSURE: 89 MMHG

## 2024-10-20 PROCEDURE — 36556 INSERT NON-TUNNEL CV CATH: CPT

## 2024-10-20 PROCEDURE — 71046 X-RAY EXAM CHEST 2 VIEWS: CPT | Mod: 26

## 2024-10-20 PROCEDURE — 99285 EMERGENCY DEPT VISIT HI MDM: CPT | Mod: 25

## 2024-10-20 NOTE — ED PROVIDER NOTE - PHYSICAL EXAMINATION
Aurora Delgado MD (PGY-1)  Physical Exam:    Gen: Jaundiced appearing. NAD, AOx3  Head: NCAT  HEENT: +yellow sclera.  EOMI, PEERLA  Lung: CTAB, no respiratory distress, no wheezes/rhonchi/rales B/L  CV: RRR, no murmurs, rubs or gallops  Abd: Tender in the LLQ/suprapubic region, distended, soft no guarding, no rigidity, no rebound tenderness, no CVA tenderness   MSK: no visible deformities, ROM normal in UE/LE, no back pain  Neuro: No focal sensory or motor deficits. Sensation intact to light touch all extremities.  Skin: Warm, well perfused, no rash, no leg swelling  Psych: normal affect, calm

## 2024-10-20 NOTE — ED PROVIDER NOTE - NS ED ROS FT
GENERAL: No fever or chills  EYES: +yellowing of sclera. No change in vision  HEENT: No trouble swallowing or speaking  CARDIAC: No chest pain  PULMONARY: No cough or SOB  GI: +Nausea, vomiting. No abdominal pain, no diarrhea or constipation  : No changes in urination  SKIN: No rashes  NEURO: No headache, no numbness  MSK: No joint pain  Otherwise as HPI or negative.

## 2024-10-20 NOTE — ED PROVIDER NOTE - OBJECTIVE STATEMENT
A 59-year-old male presenting today with a past medical history of acute liver failure, chronic alcohol abuse, gastritis, peptic ulcer disease, presenting with several hours of vomiting.  Patient states he has been vomiting up to 1 small bucket of emesis, and noticed blood in the last episode.  Denies any abdominal pain.  Denies any recent drinking.  Patient denies any recent fever, chills, diarrhea, constipation.  Patient was discharged from [facility] approximately 10 days ago.  Patient was seen by hepatology at that time, as well as the transplant team.  Patient has a longstanding history of alcohol withdrawal and delirium tremens with frequent hospital and ICU admissions, and a history of hypoxic respiratory failure.  Patient was intubated, and extubated on 923 for altered mental status.  MELD score was 34 at the time.  Patient had a paracentesis for his ascites, with 800 cc of fluid removed on October 1.  There has been no record of a endoscopy done.

## 2024-10-20 NOTE — ED PROVIDER NOTE - PROGRESS NOTE DETAILS
Dr. Light: Received signout from Dr. Chavez.  59-year-old male history of alcohol abuse status post recent transfer from [place] to [place] for liver transplant evaluation for fulminant hepatic failure due to alcohol abuse and acetaminophen overdose, patient did not meet criteria for transplant at that time and was discharged home, has been ETOH free for over 40 days, liver enzymes improved, h/p gastritis, peptic ulcer disease, presented with blood tinged emesis to the ED last night.  No active emesis in the emergency department.  Also complains of abdominal pain but no fevers, chills, dysuria, constipation, diarrhea.  Labs reviewed, no leukocytosis, anemia at baseline, INR improved from prior admission, LFTs improved from prior admission.  Patient seen at bedside, well-appearing, no acute distress, no signs or symptoms of alcohol withdrawal at this time.  Awaiting CT will need admission.  Patient given ceftriaxone, octreotide, pantoprazole given history of alcohol abuse and upper GI bleed. Bhupendra Linder DO (PGY2)  Hospital return call for admission and recently discharged patient.  Hospitalist requesting transfer hepatology consulted given patient recently discharged from their service.  Will contact transplant pathology for pt w/ alcohol associated hepatitis and cirrhosis presenting with bloody emesis.

## 2024-10-20 NOTE — ED PROVIDER NOTE - ATTENDING CONTRIBUTION TO CARE
This is a 59-year-old fellow who has a history of encephalopathy from liver failure recently admitted to the hospital and intubated now coming in with vomiting blood.  No pain in the abdomen.  Per the patient and the family he has not had any alcohol in about a month.  Patient is very jaundiced awake alert talking minimal tremors no fasciculations nontender abdomen clutching a vomit bag with minimal amount of streaks of blood and no vomitus.  No leg edema warm and well-perfused  Patient with hematemesis.  It is unclear how much blood he is vomiting.  I reviewed the old charts including the most recent hepatology note and it does not appear that the patient has had a recent upper endoscopy and it is not clear if the patient has varices.  Hospitalization at the beginning of October had a CT scan and they did not comment on the liver or any varices.  CBC CMP coags ammonia level alcohol level transfuse to 7 and admit to the hospital.  I endorsed the patient to change of shift to the overnight doctor.

## 2024-10-20 NOTE — ED ADULT TRIAGE NOTE - CHIEF COMPLAINT QUOTE
vomiting x3 hours, now with blood in it. PMH liver disease. Pt also endorsing right arm pain and right sided face pain.

## 2024-10-20 NOTE — ED PROVIDER NOTE - CLINICAL SUMMARY MEDICAL DECISION MAKING FREE TEXT BOX
A 59-year-old male presenting today with a past medical history of acute liver failure, chronic alcohol abuse, gastritis, peptic ulcer disease, presenting with several hours of vomiting.  Patient states he has been vomiting up to 1 small bucket of emesis, and noticed blood in the last episode.  Denies any abdominal pain.  Denies any recent drinking.  Patient denies any recent fever, chills, diarrhea, constipation.  Patient was discharged from [facility] approximately 10 days ago.  Patient was seen by hepatology at that time, as well as the transplant team.  Patient has a longstanding history of alcohol withdrawal and delirium tremens with frequent hospital and ICU admissions, and a history of hypoxic respiratory failure.  Patient was intubated, and extubated on 923 for altered mental status.  MELD score was 34 at the time.  Patient had a paracentesis for his ascites, with 800 cc of fluid removed on October 1.  There has been no record of a endoscopy done.    Plan: CBC, CMP, CT Abdomen/Pelvis, Ceftriaxone, Octreotide, Xray  Admit

## 2024-10-20 NOTE — ED ADULT NURSE NOTE - OBJECTIVE STATEMENT
58 y/o male with PMH ETOH abuse, liver failure presenting to ED for nausea, vomiting x 3 hours, also having right arm pain and right sided facial pain. Upon exam pt A&Ox3 no difficulty speaking in complete sentences, s1s2 heart sounds heard, montaño x4, abdomen soft TTP LLQ nondistended, skin intact. Pt denies chest pain, sob, diarrhea,  abdominal pain, f/c, urinary symptoms, hematuria. US guided IV to be obtained by MD Delgado.

## 2024-10-21 DIAGNOSIS — Z29.9 ENCOUNTER FOR PROPHYLACTIC MEASURES, UNSPECIFIED: ICD-10-CM

## 2024-10-21 DIAGNOSIS — K74.60 UNSPECIFIED CIRRHOSIS OF LIVER: ICD-10-CM

## 2024-10-21 DIAGNOSIS — F10.20 ALCOHOL DEPENDENCE, UNCOMPLICATED: ICD-10-CM

## 2024-10-21 DIAGNOSIS — K92.2 GASTROINTESTINAL HEMORRHAGE, UNSPECIFIED: ICD-10-CM

## 2024-10-21 DIAGNOSIS — Z87.19 PERSONAL HISTORY OF OTHER DISEASES OF THE DIGESTIVE SYSTEM: ICD-10-CM

## 2024-10-21 LAB
ADD ON TEST-SPECIMEN IN LAB: SIGNIFICANT CHANGE UP
ALBUMIN SERPL ELPH-MCNC: 2.9 G/DL — LOW (ref 3.3–5)
ALP SERPL-CCNC: 108 U/L — SIGNIFICANT CHANGE UP (ref 40–120)
ALT FLD-CCNC: 57 U/L — HIGH (ref 10–45)
AMMONIA BLD-MCNC: 13 UMOL/L — SIGNIFICANT CHANGE UP (ref 11–55)
ANION GAP SERPL CALC-SCNC: 11 MMOL/L — SIGNIFICANT CHANGE UP (ref 5–17)
ANISOCYTOSIS BLD QL: SIGNIFICANT CHANGE UP
APPEARANCE UR: ABNORMAL
APTT BLD: 37.6 SEC — HIGH (ref 24.5–35.6)
AST SERPL-CCNC: 93 U/L — HIGH (ref 10–40)
BACTERIA # UR AUTO: NEGATIVE /HPF — SIGNIFICANT CHANGE UP
BASOPHILS # BLD AUTO: 0 K/UL — SIGNIFICANT CHANGE UP (ref 0–0.2)
BASOPHILS NFR BLD AUTO: 0 % — SIGNIFICANT CHANGE UP (ref 0–2)
BILIRUB SERPL-MCNC: 14.3 MG/DL — HIGH (ref 0.2–1.2)
BILIRUB UR-MCNC: ABNORMAL
BUN SERPL-MCNC: 10 MG/DL — SIGNIFICANT CHANGE UP (ref 7–23)
BURR CELLS BLD QL SMEAR: PRESENT — SIGNIFICANT CHANGE UP
CALCIUM SERPL-MCNC: 9.3 MG/DL — SIGNIFICANT CHANGE UP (ref 8.4–10.5)
CAST: 0 /LPF — SIGNIFICANT CHANGE UP (ref 0–4)
CHLORIDE SERPL-SCNC: 104 MMOL/L — SIGNIFICANT CHANGE UP (ref 96–108)
CO2 SERPL-SCNC: 17 MMOL/L — LOW (ref 22–31)
COLOR SPEC: SIGNIFICANT CHANGE UP
CREAT SERPL-MCNC: 0.92 MG/DL — SIGNIFICANT CHANGE UP (ref 0.5–1.3)
DACRYOCYTES BLD QL SMEAR: SLIGHT — SIGNIFICANT CHANGE UP
DIFF PNL FLD: NEGATIVE — SIGNIFICANT CHANGE UP
EGFR: 97 ML/MIN/1.73M2 — SIGNIFICANT CHANGE UP
EOSINOPHIL # BLD AUTO: 0 K/UL — SIGNIFICANT CHANGE UP (ref 0–0.5)
EOSINOPHIL NFR BLD AUTO: 0 % — SIGNIFICANT CHANGE UP (ref 0–6)
ETHANOL SERPL-MCNC: <10 MG/DL — SIGNIFICANT CHANGE UP (ref 0–10)
GAS PNL BLDV: SIGNIFICANT CHANGE UP
GLUCOSE SERPL-MCNC: 87 MG/DL — SIGNIFICANT CHANGE UP (ref 70–99)
GLUCOSE UR QL: NEGATIVE MG/DL — SIGNIFICANT CHANGE UP
HCT VFR BLD CALC: 28.1 % — LOW (ref 39–50)
HGB BLD-MCNC: 9.4 G/DL — LOW (ref 13–17)
INR BLD: 1.95 RATIO — HIGH (ref 0.85–1.16)
KETONES UR-MCNC: NEGATIVE MG/DL — SIGNIFICANT CHANGE UP
LEUKOCYTE ESTERASE UR-ACNC: NEGATIVE — SIGNIFICANT CHANGE UP
LYMPHOCYTES # BLD AUTO: 1.08 K/UL — SIGNIFICANT CHANGE UP (ref 1–3.3)
LYMPHOCYTES # BLD AUTO: 20 % — SIGNIFICANT CHANGE UP (ref 13–44)
MACROCYTES BLD QL: SIGNIFICANT CHANGE UP
MANUAL SMEAR VERIFICATION: SIGNIFICANT CHANGE UP
MCHC RBC-ENTMCNC: 29 PG — SIGNIFICANT CHANGE UP (ref 27–34)
MCHC RBC-ENTMCNC: 33.5 GM/DL — SIGNIFICANT CHANGE UP (ref 32–36)
MCV RBC AUTO: 86.7 FL — SIGNIFICANT CHANGE UP (ref 80–100)
METAMYELOCYTES # FLD: 1.9 % — HIGH (ref 0–0)
MONOCYTES # BLD AUTO: 0.62 K/UL — SIGNIFICANT CHANGE UP (ref 0–0.9)
MONOCYTES NFR BLD AUTO: 11.4 % — SIGNIFICANT CHANGE UP (ref 2–14)
MYELOCYTES NFR BLD: 3.8 % — HIGH (ref 0–0)
NEUTROPHILS # BLD AUTO: 3.4 K/UL — SIGNIFICANT CHANGE UP (ref 1.8–7.4)
NEUTROPHILS NFR BLD AUTO: 61.9 % — SIGNIFICANT CHANGE UP (ref 43–77)
NEUTS BAND # BLD: 1 % — SIGNIFICANT CHANGE UP (ref 0–8)
NITRITE UR-MCNC: NEGATIVE — SIGNIFICANT CHANGE UP
OVALOCYTES BLD QL SMEAR: SLIGHT — SIGNIFICANT CHANGE UP
PH UR: 6 — SIGNIFICANT CHANGE UP (ref 5–8)
PLAT MORPH BLD: ABNORMAL
PLATELET # BLD AUTO: 178 K/UL — SIGNIFICANT CHANGE UP (ref 150–400)
POIKILOCYTOSIS BLD QL AUTO: SLIGHT — SIGNIFICANT CHANGE UP
POLYCHROMASIA BLD QL SMEAR: SLIGHT — SIGNIFICANT CHANGE UP
POTASSIUM SERPL-MCNC: 4.2 MMOL/L — SIGNIFICANT CHANGE UP (ref 3.5–5.3)
POTASSIUM SERPL-SCNC: 4.2 MMOL/L — SIGNIFICANT CHANGE UP (ref 3.5–5.3)
PROT SERPL-MCNC: 9.6 G/DL — HIGH (ref 6–8.3)
PROT UR-MCNC: NEGATIVE MG/DL — SIGNIFICANT CHANGE UP
PROTHROM AB SERPL-ACNC: 22.1 SEC — HIGH (ref 9.9–13.4)
RBC # BLD: 3.24 M/UL — LOW (ref 4.2–5.8)
RBC # FLD: 25.5 % — HIGH (ref 10.3–14.5)
RBC BLD AUTO: ABNORMAL
RBC CASTS # UR COMP ASSIST: 4 /HPF — SIGNIFICANT CHANGE UP (ref 0–4)
SMUDGE CELLS # BLD: PRESENT — SIGNIFICANT CHANGE UP
SODIUM SERPL-SCNC: 132 MMOL/L — LOW (ref 135–145)
SP GR SPEC: 1.01 — SIGNIFICANT CHANGE UP (ref 1–1.03)
SQUAMOUS # UR AUTO: 1 /HPF — SIGNIFICANT CHANGE UP (ref 0–5)
TARGETS BLD QL SMEAR: SLIGHT — SIGNIFICANT CHANGE UP
UROBILINOGEN FLD QL: 0.2 MG/DL — SIGNIFICANT CHANGE UP (ref 0.2–1)
WBC # BLD: 5.41 K/UL — SIGNIFICANT CHANGE UP (ref 3.8–10.5)
WBC # FLD AUTO: 5.41 K/UL — SIGNIFICANT CHANGE UP (ref 3.8–10.5)
WBC UR QL: 0 /HPF — SIGNIFICANT CHANGE UP (ref 0–5)

## 2024-10-21 PROCEDURE — 99223 1ST HOSP IP/OBS HIGH 75: CPT | Mod: GC

## 2024-10-21 PROCEDURE — 74177 CT ABD & PELVIS W/CONTRAST: CPT | Mod: 26,MC

## 2024-10-21 RX ORDER — PANTOPRAZOLE SODIUM 40 MG/1
40 TABLET, DELAYED RELEASE ORAL ONCE
Refills: 0 | Status: COMPLETED | OUTPATIENT
Start: 2024-10-21 | End: 2024-10-21

## 2024-10-21 RX ORDER — ONDANSETRON HYDROCHLORIDE 2 MG/ML
4 INJECTION, SOLUTION INTRAMUSCULAR; INTRAVENOUS EVERY 8 HOURS
Refills: 0 | Status: DISCONTINUED | OUTPATIENT
Start: 2024-10-21 | End: 2024-10-23

## 2024-10-21 RX ORDER — DIPHENHYDRAMINE HCL 12.5MG/5ML
25 ELIXIR ORAL ONCE
Refills: 0 | Status: COMPLETED | OUTPATIENT
Start: 2024-10-21 | End: 2024-10-21

## 2024-10-21 RX ORDER — SODIUM CHLORIDE 9 MG/ML
1000 INJECTION, SOLUTION INTRAMUSCULAR; INTRAVENOUS; SUBCUTANEOUS ONCE
Refills: 0 | Status: COMPLETED | OUTPATIENT
Start: 2024-10-21 | End: 2024-10-21

## 2024-10-21 RX ORDER — ACETAMINOPHEN 500 MG
650 TABLET ORAL EVERY 8 HOURS
Refills: 0 | Status: DISCONTINUED | OUTPATIENT
Start: 2024-10-21 | End: 2024-10-23

## 2024-10-21 RX ORDER — ONDANSETRON HYDROCHLORIDE 2 MG/ML
4 INJECTION, SOLUTION INTRAMUSCULAR; INTRAVENOUS ONCE
Refills: 0 | Status: DISCONTINUED | OUTPATIENT
Start: 2024-10-21 | End: 2024-10-21

## 2024-10-21 RX ORDER — HYDROXYZINE HCL 25 MG
25 TABLET ORAL THREE TIMES A DAY
Refills: 0 | Status: DISCONTINUED | OUTPATIENT
Start: 2024-10-21 | End: 2024-10-23

## 2024-10-21 RX ORDER — LACTULOSE 10 G/15 ML
10 SOLUTION, ORAL ORAL EVERY 8 HOURS
Refills: 0 | Status: DISCONTINUED | OUTPATIENT
Start: 2024-10-21 | End: 2024-10-23

## 2024-10-21 RX ORDER — CHLORHEXIDINE GLUCONATE 40 MG/ML
1 SOLUTION TOPICAL
Refills: 0 | Status: DISCONTINUED | OUTPATIENT
Start: 2024-10-21 | End: 2024-10-23

## 2024-10-21 RX ORDER — OCTREOTIDE ACETATE 1000 UG/ML
50 INJECTION INTRAVENOUS; SUBCUTANEOUS ONCE
Refills: 0 | Status: COMPLETED | OUTPATIENT
Start: 2024-10-21 | End: 2024-10-21

## 2024-10-21 RX ORDER — FOLIC ACID 1 MG/1
1 TABLET ORAL DAILY
Refills: 0 | Status: DISCONTINUED | OUTPATIENT
Start: 2024-10-21 | End: 2024-10-23

## 2024-10-21 RX ORDER — ONDANSETRON HYDROCHLORIDE 2 MG/ML
4 INJECTION, SOLUTION INTRAMUSCULAR; INTRAVENOUS ONCE
Refills: 0 | Status: COMPLETED | OUTPATIENT
Start: 2024-10-21 | End: 2024-10-21

## 2024-10-21 RX ORDER — PANTOPRAZOLE SODIUM 40 MG/1
40 TABLET, DELAYED RELEASE ORAL ONCE
Refills: 0 | Status: DISCONTINUED | OUTPATIENT
Start: 2024-10-21 | End: 2024-10-23

## 2024-10-21 RX ORDER — OCTREOTIDE ACETATE 1000 UG/ML
50 INJECTION INTRAVENOUS; SUBCUTANEOUS
Qty: 500 | Refills: 0 | Status: DISCONTINUED | OUTPATIENT
Start: 2024-10-21 | End: 2024-10-22

## 2024-10-21 RX ORDER — CEFTRIAXONE SODIUM 10 G
1000 VIAL (EA) INJECTION ONCE
Refills: 0 | Status: COMPLETED | OUTPATIENT
Start: 2024-10-21 | End: 2024-10-21

## 2024-10-21 RX ORDER — CEFTRIAXONE SODIUM 10 G
1000 VIAL (EA) INJECTION EVERY 24 HOURS
Refills: 0 | Status: DISCONTINUED | OUTPATIENT
Start: 2024-10-22 | End: 2024-10-22

## 2024-10-21 RX ORDER — PANTOPRAZOLE SODIUM 40 MG/1
40 TABLET, DELAYED RELEASE ORAL EVERY 12 HOURS
Refills: 0 | Status: DISCONTINUED | OUTPATIENT
Start: 2024-10-22 | End: 2024-10-22

## 2024-10-21 RX ORDER — LABETALOL HCL 200 MG
10 TABLET ORAL ONCE
Refills: 0 | Status: COMPLETED | OUTPATIENT
Start: 2024-10-21 | End: 2024-10-21

## 2024-10-21 RX ORDER — MORPHINE SULFATE 30 MG/1
2 TABLET, EXTENDED RELEASE ORAL ONCE
Refills: 0 | Status: DISCONTINUED | OUTPATIENT
Start: 2024-10-21 | End: 2024-10-21

## 2024-10-21 RX ORDER — PANTOPRAZOLE SODIUM 40 MG/1
40 TABLET, DELAYED RELEASE ORAL
Refills: 0 | Status: DISCONTINUED | OUTPATIENT
Start: 2024-10-21 | End: 2024-10-21

## 2024-10-21 RX ORDER — SODIUM CHLORIDE 9 MG/ML
10 INJECTION, SOLUTION INTRAMUSCULAR; INTRAVENOUS; SUBCUTANEOUS
Refills: 0 | Status: DISCONTINUED | OUTPATIENT
Start: 2024-10-21 | End: 2024-10-23

## 2024-10-21 RX ORDER — THIAMINE HCL 100 MG
100 TABLET ORAL DAILY
Refills: 0 | Status: DISCONTINUED | OUTPATIENT
Start: 2024-10-21 | End: 2024-10-23

## 2024-10-21 RX ORDER — INFLUENZ VIR VAC TV P-SURF2003 15MCG/.5ML
0.5 SYRINGE (ML) INTRAMUSCULAR ONCE
Refills: 0 | Status: DISCONTINUED | OUTPATIENT
Start: 2024-10-21 | End: 2024-10-23

## 2024-10-21 RX ORDER — B-COMPLEX WITH VITAMIN C
1 VIAL (ML) INJECTION DAILY
Refills: 0 | Status: DISCONTINUED | OUTPATIENT
Start: 2024-10-21 | End: 2024-10-23

## 2024-10-21 RX ORDER — CHOLESTYRAMINE 4 G
4 POWDER IN PACKET (EA) ORAL THREE TIMES A DAY
Refills: 0 | Status: DISCONTINUED | OUTPATIENT
Start: 2024-10-21 | End: 2024-10-23

## 2024-10-21 RX ADMIN — PANTOPRAZOLE SODIUM 40 MILLIGRAM(S): 40 TABLET, DELAYED RELEASE ORAL at 07:16

## 2024-10-21 RX ADMIN — ONDANSETRON HYDROCHLORIDE 4 MILLIGRAM(S): 2 INJECTION, SOLUTION INTRAMUSCULAR; INTRAVENOUS at 00:52

## 2024-10-21 RX ADMIN — MORPHINE SULFATE 2 MILLIGRAM(S): 30 TABLET, EXTENDED RELEASE ORAL at 02:33

## 2024-10-21 RX ADMIN — Medication 25 MILLIGRAM(S): at 21:54

## 2024-10-21 RX ADMIN — Medication 650 MILLIGRAM(S): at 21:54

## 2024-10-21 RX ADMIN — OCTREOTIDE ACETATE 10 MICROGRAM(S)/HR: 1000 INJECTION INTRAVENOUS; SUBCUTANEOUS at 17:33

## 2024-10-21 RX ADMIN — Medication 100 MILLIGRAM(S): at 00:53

## 2024-10-21 RX ADMIN — SODIUM CHLORIDE 1000 MILLILITER(S): 9 INJECTION, SOLUTION INTRAMUSCULAR; INTRAVENOUS; SUBCUTANEOUS at 07:16

## 2024-10-21 RX ADMIN — OCTREOTIDE ACETATE 50 MICROGRAM(S): 1000 INJECTION INTRAVENOUS; SUBCUTANEOUS at 01:52

## 2024-10-21 RX ADMIN — Medication 25 MILLIGRAM(S): at 04:20

## 2024-10-21 RX ADMIN — MORPHINE SULFATE 2 MILLIGRAM(S): 30 TABLET, EXTENDED RELEASE ORAL at 12:05

## 2024-10-21 RX ADMIN — Medication 25 MILLIGRAM(S): at 09:22

## 2024-10-21 RX ADMIN — OCTREOTIDE ACETATE 50 MICROGRAM(S): 1000 INJECTION INTRAVENOUS; SUBCUTANEOUS at 17:34

## 2024-10-21 RX ADMIN — Medication 10 GRAM(S): at 21:54

## 2024-10-21 RX ADMIN — Medication 10 MILLIGRAM(S): at 06:56

## 2024-10-21 RX ADMIN — MORPHINE SULFATE 2 MILLIGRAM(S): 30 TABLET, EXTENDED RELEASE ORAL at 00:53

## 2024-10-21 NOTE — PATIENT PROFILE ADULT - FUNCTIONAL ASSESSMENT - DAILY ACTIVITY 3.
16-year-old male with no past medical history brought in by mother with c/o head injury 3 days ago. Pt states that he tripped and fell backwards while walking home after school on 11/29. Pt c/o mild diffuse headache since, currently 2/10. States that pt had 2 episodes of vomiting on 11/30 only. Pt was seen in urgent care and got referred for outpatient ct scan of head however mother states that she hasn't be able to take him yet and came to ED. Denies LOC, dizziness, vision changes, numbness, tingling, weakness, neck pain, changes in behavior, confusion or other symptoms/injuries. 3 = A little assistance

## 2024-10-21 NOTE — PATIENT PROFILE ADULT - FUNCTIONAL SCREEN CURRENT LEVEL: COMMUNICATION, MLM
Please inform patient that her urine culture actually shows gardnerella vaginalis, which is bacterial vaginosis, which can cause vaginal odor and discharge  This can be treated with Metronidazole 500mg BID x 7 days.  She CANNOT drink alcohol with this medication, as it will cause her to feel sick.  Rx is pending in .   0 = understands/communicates without difficulty

## 2024-10-21 NOTE — H&P ADULT - PROBLEM SELECTOR PLAN 4
DVT PPx: Improve Score: Heparin 5000 subq Q8h/Lovenox 40mg QD   Diet: Regular/DASH/CC/Renal/NPO  Bowel Regimen: Miralax/Senna/Mag Citrate/ Lactulose/Enema  Code: Full  Dispo: PT/OT Pending Hospital Course  Pharmacy:  PCP:   Communication:     --------------------------------------------------------------  Lucia Toure MD  PGY-1, Internal Medicine  [employer] Teams preferred  -------------------------------------------------------------- DVT PPx: Improve Score: hold iso if GI bleed   Diet: regular   Bowel Regimen: hold iso of gi bleed   Code: Full  Dispo: PT/OT Pending Hospital Course  Pharmacy:  PCP:   Communication:     --------------------------------------------------------------  Lucia Toure MD  PGY-1, Internal Medicine  [employer] Teams preferred  -------------------------------------------------------------- DVT PPx: Improve Score: hold iso if GI bleed   Diet: NPO iso of emesis   Bowel Regimen: hold iso of gi bleed   Code: Full  Dispo: PT/OT Pending Hospital Course  Pharmacy:  PCP:   Communication:     --------------------------------------------------------------  Lucia Toure MD  PGY-1, Internal Medicine  [employer] Teams preferred  --------------------------------------------------------------

## 2024-10-21 NOTE — H&P ADULT - ASSESSMENT
A 59-year-old male presenting today with a past medical history of acute liver failure, chronic alcohol abuse, gastritis, peptic ulcer disease, presenting with several hours of vomiting.

## 2024-10-21 NOTE — H&P ADULT - PROBLEM SELECTOR PLAN 3
- consult GI   -C/w cholestryamine pt w/ known liver disease iso of chronic alcohol use   - consult GI   -C/w cholestryamine pt w/ known liver disease iso of chronic alcohol use   - consult hepatology  -C/w cholestyramine pt w/ known liver disease iso of chronic alcohol use     - consult hepatology  - C/w cholestyramine  - hold miralax and lactulose, keep NPO iso of emesis

## 2024-10-21 NOTE — CONSULT NOTE ADULT - ASSESSMENT
59 years old male with h/o chronic ETOH abuse and dependence (1 bottle of Vodka a day) with long standing hx of alcohol withdrawal and DTs with frequent hospital/ICU admissions, alcoholic gastritis/esophagitis, PUD, HLD, recent admission for hypoxic respiratory failure requiring intubation due to cerebral edema and AMS, extubated on 9/23 with improvement in mental status s/p Plex x3, mannitol and hypertonic saline. Now on admission with cc of emesis, with vomitus stained with blood.       Assessment    #Nausea/vomiting r/o gastroenteritis  #Decompensated alcohol cirrhosis, MELD 26    - Calculated MELD 3.0 score 34 >>27 (10/11). Blood type A  - Ascites: s/p paracentesis 10/1 with 800cc fluid removal, SAAG > 1.1, Total protein 2.5, negative for SBP  - Varices: no EGD on file  - HE: in last admission. Currently AAO x3, no asterixis  - HCC: triple phase CT (9/30) without lesion       Recommendations:   - Supportive mgt for nausea and vomiting.   - Recommend getting stool studies - GI pcr. If watery, can add C.diff  - Get blood cultures  - lactulose with goal of 2-3 bm soft/day, hold off if patient having diarrhea  - Rifaximin 550 bid  - Most likely has small Jacque barragan tear from frequent vomiting.  - No indication for upper endoscopy for now  - Advisable to get a PETH level  - Hydroxyzine 25 mg TID PRN pruritis   - Low sodium diet.  - Alcohol cessation counseling        Griselda Hallman, PGY4  Gastroenterology and Hepatology  Available on TEAMS    For non urgent consult, please email [email or web address] (For [place]) or [email or web address] (For [facility])  Long range page number [phone]. Short range [phone]     59 years old male with h/o chronic ETOH abuse and dependence (1 bottle of Vodka a day) with long standing hx of alcohol withdrawal and DTs with frequent hospital/ICU admissions, alcoholic gastritis/esophagitis, PUD, HLD, recent admission for hypoxic respiratory failure requiring intubation due to cerebral edema and AMS, extubated on 9/23 with improvement in mental status s/p Plex x3, mannitol and hypertonic saline. Now on admission with cc of emesis, with vomitus stained with blood.       Assessment    #Nausea/vomiting r/o gastroenteritis    #Decompensated alcohol cirrhosis, MELD 26    - Ascites: s/p paracentesis 10/1 with 800cc fluid removal, SAAG > 1.1, Total protein 2.5, negative for SBP  - Varices: no EGD on file  - HE: in last admission. Currently AAO x3, no asterixis  - HCC: triple phase CT (9/30) without lesion       Recommendations:   - Supportive mgt for nausea and vomiting.   - Recommend getting stool studies - GI pcr. If watery, can add C.diff  - Get blood cultures  - lactulose with goal of 2-3 bm soft/day, hold off if patient having diarrhea  - Rifaximin 550 bid  - Most likely has small Jacque barragan tear from frequent vomiting.  - No indication for upper endoscopy for now  - Advisable to get a PETH level  - Hydroxyzine 25 mg TID PRN pruritis   - Low sodium diet.  - Alcohol cessation counseling    The above is not complete until attending' s attestation     Griselda Hallman, PGY4  Gastroenterology and Hepatology  Available on TEAMS    For non urgent consult, please email [email or web address] (For [place]) or [email or web address] (For [facility])  Long range page number [phone]. Short range [phone]

## 2024-10-21 NOTE — PATIENT PROFILE ADULT - FALL HARM RISK - HARM RISK INTERVENTIONS

## 2024-10-21 NOTE — H&P ADULT - NSHPPHYSICALEXAM_GEN_ALL_CORE
T(C): 36.7 (10-21-24 @ 11:38), Max: 36.9 (10-20-24 @ 21:38)  HR: 68 (10-21-24 @ 11:38) (67 - 91)  BP: 125/86 (10-21-24 @ 11:38) (119/80 - 161/131)  RR: 18 (10-21-24 @ 11:38) (14 - 20)  SpO2: 100% (10-21-24 @ 11:38) (99% - 100%)    CONSTITUTIONAL: Well groomed, no apparent distress, jaundiced   EYES: icteric, EOMI  ENMT: Oral mucosa with moist membranes. Normal dentition; no pharyngeal injection or exudates  NECK: Supple, symmetric and without tracheal deviation   RESP: No respiratory distress, no use of accessory muscles; CTA b/l, no WRR  CV: RRR, +S1S2, no MRG; no JVD; no peripheral edema  GI: Soft, NT, ND, no rebound, no guarding; no palpable masses; no hepatosplenomegaly; no hernia palpated  LYMPH: No cervical LAD or tenderness; no axillary LAD or tenderness; no inguinal LAD or tenderness  MSK: Normal gait; No digital clubbing or cyanosis; examination of the (head/neck/spine/ribs/pelvis, RUE, LUE, RLE, LLE) without misalignment,            Normal ROM without pain, no spinal tenderness, normal muscle strength/tone, no asterixis but some tremors b/l   SKIN: jaundiced  NEURO: CN II-XII intact; normal reflexes in upper and lower extremities, sensation intact in upper and lower extremities b/l to light touch   PSYCH: Appropriate insight/judgment; A+O x 3, some word finding difficulty T(C): 36.7 (10-21-24 @ 11:38), Max: 36.9 (10-20-24 @ 21:38)  HR: 68 (10-21-24 @ 11:38) (67 - 91)  BP: 125/86 (10-21-24 @ 11:38) (119/80 - 161/131)  RR: 18 (10-21-24 @ 11:38) (14 - 20)  SpO2: 100% (10-21-24 @ 11:38) (99% - 100%)    CONSTITUTIONAL: Well groomed, no apparent distress, jaundiced   EYES: icteric, EOMI  ENMT: Oral mucosa with moist membranes. Normal dentition; no pharyngeal injection or exudates  NECK: Supple, symmetric and without tracheal deviation   RESP: No respiratory distress, no use of accessory muscles; CTA b/l, no WRR  CV: RRR, +S1S2, no MRG; no JVD; no peripheral edema  GI: Soft, NT, ND, no rebound, no guarding; no palpable masses; no hepatosplenomegaly; no hernia palpated  LYMPH: No cervical LAD or tenderness; no axillary LAD or tenderness; no inguinal LAD or tenderness  MSK: Normal gait; No digital clubbing or cyanosis;  Normal ROM without pain, no spinal tenderness, normal muscle strength/tone, no asterixis but some tremors b/l   SKIN: jaundiced  NEURO: CN II-XII intact; normal reflexes in upper and lower extremities, sensation intact in upper and lower extremities b/l to light touch   PSYCH: Appropriate insight/judgment; A+O x 3, some word finding difficulty

## 2024-10-21 NOTE — H&P ADULT - ATTENDING COMMENTS
Agree with above note by R1 Dr. Toure incl findings and plan, edited where appropriate  pt seen and examined - appears well, no recurrent bleeding or vomiting  pt does endorse hematemesis concern  prior egd not known in our system, seems plausible pt may require inpatient endoscopy. trend Hb  c/w sbp ppx, octreotide, ppi pending hepatology input  prev pt determined to not be olt candidate - unclear if that will be reevaluated if pt continues to abstain from alcohol - f/u hepatology  resume HE mgmt once we know EGD plan and if pt can take PO  Attending Physician Dr. Jimi Blackmon - available on Microsoft Teams

## 2024-10-21 NOTE — PROCEDURE NOTE - NSPROCSEDATIONNOT_GEN_ALL_CORE
Message  Received: Yesterday       Sebastián Bermudez MD  [facility] Heart Ep Nurse                     He is now in persistent Afib.  HR is well controlled.  We will continue current therapy.  Please update pt on results.  Sebastián Bermudez       Writer called pt with results of Holter as above and he verbalized understanding. LILIANA Ortega RN.   No

## 2024-10-21 NOTE — H&P ADULT - PROBLEM SELECTOR PLAN 2
Pt reports last drink ~50 days ago     - C/w thiamine, multivitamin, folic acid  - Pt reports last drink ~50 days ago     - C/w thiamine, multivitamin, folic acid  - Monitor for withdrawal symptoms

## 2024-10-21 NOTE — H&P ADULT - HISTORY OF PRESENT ILLNESS
59-year-old male with pmh of acute liver failure, chronic alcohol abuse, gastritis, peptic ulcer disease, withdrawal and delirium tremens with frequent hospital and ICU admissions, and a history of hypoxic respiratory failure. presenting with several hours of vomiting. History provided by daughter on phone and patient at bedside. Pt states that he was eating normally when he had a "bucket" of emesis that was mostly food he ingested. Daughter stated that there was some blood in the emesis and she noted that he was wiping blood off of his mouth. Pt amos abd pain, recent drinking, illnesses, blood in stool.    59-year-old male with pmh of acute liver failure, chronic alcohol abuse, gastritis, peptic ulcer disease, prior withdrawal w/ delirium tremens and frequent hospital admissions w/ 1 prior ICU admission for hypoxic resp failure requiring intubation  presenting with several hours of vomiting. History provided by daughter on phone and patient at bedside. Pt states that he was eating as normal throughout the day; at night he had a "bucket" of emesis that was mostly food he ingested. Patient had 2nd episode of emesis that was larger and daughter stated that there was some blood and she noted that he was wiping blood off of his mouth. Pt denies abd pain, recent drinking, illnesses, blood in stool, diarrhea, sick contacts.

## 2024-10-21 NOTE — H&P ADULT - PROBLEM SELECTOR PLAN 1
Hgb stable 9.4  Last episode of emesis was last night potentially 2/2 to esophageal varices iso cirrhosis     - GI consult to see if endoscopy needed inpatient  - Octreotide  - pantoprazole  - labetolol   - Trend Hgb Hgb stable 9.4  Last episode of emesis was last night potentially 2/2 to esophageal varices iso cirrhosis     - GI consult to see if endoscopy needed inpatient  - s/p octreotide 50mcg IVP, can consider continuimng  - pantoprazole 40mg BID   - Trend Hgb Hgb stable 9.4  Last episode of emesis was last night potentially 2/2 to esophageal varices iso cirrhosis     - hepatology consult to see if endoscopy needed inpatient  - s/p octreotide 50mcg IVP, continue gtt, c/w ceftriaxone  - pantoprazole 40mg BID   - Trend Hgb Hgb stable 9.4 and patient hemodynamically stable  Last episode at home prior to ED   potentially 2/2 to esophageal varices iso cirrhosis     - hepatology consult to see if endoscopy needed inpatient  - s/p octreotide 50mcg IVP, continue gtt, c/w ceftriaxone  - pantoprazole 40mg BID   - Trend Hgb

## 2024-10-21 NOTE — H&P ADULT - NSHPLABSRESULTS_GEN_ALL_CORE
Labs personally reviewed:                          9.4    5.41  )-----------( 178      ( 21 Oct 2024 00:49 )             28.1       10-21    132[L]  |  104  |  10  ----------------------------<  87  4.2   |  17[L]  |  0.92    Ca    9.3      21 Oct 2024 00:49    TPro  9.6[H]  /  Alb  2.9[L]  /  TBili  14.3[H]  /  DBili  x   /  AST  93[H]  /  ALT  57[H]  /  AlkPhos  108  10-21      PT/INR - ( 21 Oct 2024 00:49 )   PT: 22.1 sec;   INR: 1.95 ratio         PTT - ( 21 Oct 2024 00:49 )  PTT:37.6 sec    MELD 27            Imaging personally reviewed:    < from: CT Abdomen and Pelvis w/ IV Cont (10.21.24 @ 09:01) >    IMPRESSION:  No acute pathology.          < end of copied text >        EKG personally reviewed:

## 2024-10-21 NOTE — CONSULT NOTE ADULT - SUBJECTIVE AND OBJECTIVE BOX
59-year-old male with pmh of acute liver failure, chronic alcohol abuse, gastritis, peptic ulcer disease, withdrawal and delirium tremens with frequent hospital and ICU admissions, and a history of hypoxic respiratory failure. presenting with several hours of vomiting. History provided by daughter on phone and patient at bedside. Pt states that he was eating normally when he had a "bucket" of emesis that was mostly food he ingested. Daughter stated that there was some blood in the emesis and she noted that he was wiping blood off of his mouth. he denies any melena, willie hematemesis, diarrhea,   He was just discharged recently from Transplant service on the 10/11. Followed up outpatient but eventually declined due to psychosocial factors.      Ct abd on adm - no acute pathology reported.     PAST MEDICAL & SURGICAL HISTORY:  PUD (peptic ulcer disease)      EtOH dependence      Gastritis      Elevated lipase      No significant past surgical history        FAMILY HISTORY:  FH: HTN (hypertension)        MEDS:  MEDICATIONS  (STANDING):  chlorhexidine 4% Liquid 1 Application(s) Topical <User Schedule>  cholestyramine Powder (Sugar-Free) 4 Gram(s) Oral three times a day  folic acid 1 milliGRAM(s) Oral daily  multivitamin 1 Tablet(s) Oral daily  octreotide  Infusion 50 MICROgram(s)/Hr (10 mL/Hr) IV Continuous <Continuous>  pantoprazole  Injectable 40 milliGRAM(s) IV Push once  thiamine 100 milliGRAM(s) Oral daily    MEDICATIONS  (PRN):  sodium chloride 0.9% lock flush 10 milliLiter(s) IV Push every 1 hour PRN Pre/post blood products, medications, blood draw, and to maintain line patency    Allergies    No Known Allergies    Intolerances          CONSTITUTIONAL:  No weight loss, fever, chills, weakness or fatigue.  HEENT:  Eyes:  No visual loss, blurred vision, double vision or yellow sclerae.  SKIN:  No rash or itching.  CARDIOVASCULAR:  No chest pain, chest pressure or chest discomfort.  RESPIRATORY:  No shortness of breath, cough or sputum.  GASTROINTESTINAL:  SEE HPI  GENITOURINARY:  No dysuria, hematuria, urinary frequency  NEUROLOGICAL:  No headache, dizziness, syncope, paralysis, ataxia, numbness or tingling in the extremities.  MUSCULOSKELETAL:  No muscle, back pain, joint pain or stiffness.  ENDOCRINOLOGIC:  No reports of sweating, cold or heat intolerance.     ______________________________________________________________________  PHYSICAL EXAM:  T(C): 36.7 (10-21-24 @ 11:38), Max: 36.9 (10-20-24 @ 21:38)  HR: 68 (10-21-24 @ 11:38)  BP: 125/86 (10-21-24 @ 11:38)  RR: 18 (10-21-24 @ 11:38)  SpO2: 100% (10-21-24 @ 11:38)  Wt(kg): --    10-21  -  10-21  --------------------------------------------------------  IN:  Total IN: 0 mL    OUT:    Voided (mL): 600 mL  Total OUT: 600 mL    Total NET: -600 mL          GEN: NAD, normocephalic  CVS: S1S2+  CHEST: clear to auscultation  ABD: soft , nontender, nondistended, bowel sounds present  EXTR: no cyanosis, no clubbing, no edema  NEURO: A&OX  SKIN:  warm;  non icteric    ______________________________________________________________________  LABS:                        9.4    5.41  )-----------( 178      ( 21 Oct 2024 00:49 )             28.1     10-21    132[L]  |  104  |  10  ----------------------------<  87  4.2   |  17[L]  |  0.92    Ca    9.3      21 Oct 2024 00:49    TPro  9.6[H]  /  Alb  2.9[L]  /  TBili  14.3[H]  /  DBili  x   /  AST  93[H]  /  ALT  57[H]  /  AlkPhos  108  10-21    LIVER FUNCTIONS - ( 21 Oct 2024 00:49 )  Alb: 2.9 g/dL / Pro: 9.6 g/dL / ALK PHOS: 108 U/L / ALT: 57 U/L / AST: 93 U/L / GGT: x           PT/INR - ( 21 Oct 2024 00:49 )   PT: 22.1 sec;   INR: 1.95 ratio         PTT - ( 21 Oct 2024 00:49 )  PTT:37.6 sec  ____________________________________________

## 2024-10-21 NOTE — H&P ADULT - NSHPREVIEWOFSYSTEMS_GEN_ALL_CORE
REVIEW OF SYSTEMS:    CONSTITUTIONAL:  No weakness, fevers or chills  EYES/ENT:  No visual changes;  No vertigo or throat pain   NECK:  No pain or stiffness  RESPIRATORY:  No cough, wheezing, hemoptysis; No shortness of breath  CARDIOVASCULAR:  No chest pain or palpitations  GASTROINTESTINAL:  Vomiting, no current nausea; No abdominal or epigastric pain. No diarrhea or constipation. No melena or hematochezia.  GENITOURINARY:  No dysuria, frequency or hematuria  MUSCULOSKELETAL:  FROM all extremities, normal strength, No calf tenderness  NEUROLOGICAL:  No numbness or weakness  SKIN:  No itching, rashes

## 2024-10-22 LAB
ALBUMIN SERPL ELPH-MCNC: 2.5 G/DL — LOW (ref 3.3–5)
ALP SERPL-CCNC: 87 U/L — SIGNIFICANT CHANGE UP (ref 40–120)
ALT FLD-CCNC: 54 U/L — HIGH (ref 10–45)
ANION GAP SERPL CALC-SCNC: 12 MMOL/L — SIGNIFICANT CHANGE UP (ref 5–17)
AST SERPL-CCNC: 106 U/L — HIGH (ref 10–40)
BASOPHILS # BLD AUTO: 0.03 K/UL — SIGNIFICANT CHANGE UP (ref 0–0.2)
BASOPHILS NFR BLD AUTO: 0.8 % — SIGNIFICANT CHANGE UP (ref 0–2)
BILIRUB SERPL-MCNC: 12.4 MG/DL — HIGH (ref 0.2–1.2)
BUN SERPL-MCNC: 10 MG/DL — SIGNIFICANT CHANGE UP (ref 7–23)
CALCIUM SERPL-MCNC: 8.8 MG/DL — SIGNIFICANT CHANGE UP (ref 8.4–10.5)
CHLORIDE SERPL-SCNC: 106 MMOL/L — SIGNIFICANT CHANGE UP (ref 96–108)
CO2 SERPL-SCNC: 15 MMOL/L — LOW (ref 22–31)
CREAT SERPL-MCNC: 0.92 MG/DL — SIGNIFICANT CHANGE UP (ref 0.5–1.3)
CULTURE RESULTS: SIGNIFICANT CHANGE UP
EGFR: 97 ML/MIN/1.73M2 — SIGNIFICANT CHANGE UP
EOSINOPHIL # BLD AUTO: 0.09 K/UL — SIGNIFICANT CHANGE UP (ref 0–0.5)
EOSINOPHIL NFR BLD AUTO: 2.4 % — SIGNIFICANT CHANGE UP (ref 0–6)
GLUCOSE BLDC GLUCOMTR-MCNC: 147 MG/DL — HIGH (ref 70–99)
GLUCOSE SERPL-MCNC: 206 MG/DL — HIGH (ref 70–99)
HCT VFR BLD CALC: 24.6 % — LOW (ref 39–50)
HGB BLD-MCNC: 8.2 G/DL — LOW (ref 13–17)
IMM GRANULOCYTES NFR BLD AUTO: 0.5 % — SIGNIFICANT CHANGE UP (ref 0–0.9)
LYMPHOCYTES # BLD AUTO: 1.14 K/UL — SIGNIFICANT CHANGE UP (ref 1–3.3)
LYMPHOCYTES # BLD AUTO: 30.3 % — SIGNIFICANT CHANGE UP (ref 13–44)
MAGNESIUM SERPL-MCNC: 1.6 MG/DL — SIGNIFICANT CHANGE UP (ref 1.6–2.6)
MCHC RBC-ENTMCNC: 29.1 PG — SIGNIFICANT CHANGE UP (ref 27–34)
MCHC RBC-ENTMCNC: 33.3 GM/DL — SIGNIFICANT CHANGE UP (ref 32–36)
MCV RBC AUTO: 87.2 FL — SIGNIFICANT CHANGE UP (ref 80–100)
MONOCYTES # BLD AUTO: 0.59 K/UL — SIGNIFICANT CHANGE UP (ref 0–0.9)
MONOCYTES NFR BLD AUTO: 15.7 % — HIGH (ref 2–14)
NEUTROPHILS # BLD AUTO: 1.89 K/UL — SIGNIFICANT CHANGE UP (ref 1.8–7.4)
NEUTROPHILS NFR BLD AUTO: 50.3 % — SIGNIFICANT CHANGE UP (ref 43–77)
NRBC # BLD: 0 /100 WBCS — SIGNIFICANT CHANGE UP (ref 0–0)
PHOSPHATE SERPL-MCNC: 3.3 MG/DL — SIGNIFICANT CHANGE UP (ref 2.5–4.5)
PLATELET # BLD AUTO: 178 K/UL — SIGNIFICANT CHANGE UP (ref 150–400)
POTASSIUM SERPL-MCNC: 4.2 MMOL/L — SIGNIFICANT CHANGE UP (ref 3.5–5.3)
POTASSIUM SERPL-SCNC: 4.2 MMOL/L — SIGNIFICANT CHANGE UP (ref 3.5–5.3)
PROT SERPL-MCNC: 8.1 G/DL — SIGNIFICANT CHANGE UP (ref 6–8.3)
RBC # BLD: 2.82 M/UL — LOW (ref 4.2–5.8)
RBC # FLD: 24.8 % — HIGH (ref 10.3–14.5)
SODIUM SERPL-SCNC: 133 MMOL/L — LOW (ref 135–145)
SPECIMEN SOURCE: SIGNIFICANT CHANGE UP
WBC # BLD: 3.76 K/UL — LOW (ref 3.8–10.5)
WBC # FLD AUTO: 3.76 K/UL — LOW (ref 3.8–10.5)

## 2024-10-22 PROCEDURE — 99232 SBSQ HOSP IP/OBS MODERATE 35: CPT | Mod: GC

## 2024-10-22 RX ORDER — MELATONIN 5 MG
3 TABLET ORAL AT BEDTIME
Refills: 0 | Status: DISCONTINUED | OUTPATIENT
Start: 2024-10-22 | End: 2024-10-23

## 2024-10-22 RX ADMIN — Medication 650 MILLIGRAM(S): at 14:17

## 2024-10-22 RX ADMIN — Medication 4 GRAM(S): at 09:06

## 2024-10-22 RX ADMIN — Medication 1 TABLET(S): at 12:50

## 2024-10-22 RX ADMIN — PANTOPRAZOLE SODIUM 40 MILLIGRAM(S): 40 TABLET, DELAYED RELEASE ORAL at 05:55

## 2024-10-22 RX ADMIN — Medication 25 MILLIGRAM(S): at 21:50

## 2024-10-22 RX ADMIN — OCTREOTIDE ACETATE 10 MICROGRAM(S)/HR: 1000 INJECTION INTRAVENOUS; SUBCUTANEOUS at 13:43

## 2024-10-22 RX ADMIN — Medication 25 MILLIGRAM(S): at 05:55

## 2024-10-22 RX ADMIN — Medication 3 MILLIGRAM(S): at 21:50

## 2024-10-22 RX ADMIN — Medication 25 MILLIGRAM(S): at 14:16

## 2024-10-22 RX ADMIN — Medication 650 MILLIGRAM(S): at 05:55

## 2024-10-22 RX ADMIN — Medication 10 GRAM(S): at 05:54

## 2024-10-22 RX ADMIN — FOLIC ACID 1 MILLIGRAM(S): 1 TABLET ORAL at 12:50

## 2024-10-22 RX ADMIN — Medication 10 GRAM(S): at 14:16

## 2024-10-22 RX ADMIN — Medication 10 GRAM(S): at 21:51

## 2024-10-22 RX ADMIN — Medication 4 GRAM(S): at 17:57

## 2024-10-22 RX ADMIN — Medication 100 MILLIGRAM(S): at 12:50

## 2024-10-22 RX ADMIN — Medication 4 GRAM(S): at 00:23

## 2024-10-22 RX ADMIN — Medication 100 MILLIGRAM(S): at 05:55

## 2024-10-22 RX ADMIN — Medication 650 MILLIGRAM(S): at 21:51

## 2024-10-22 NOTE — PROGRESS NOTE ADULT - ASSESSMENT
A 59-year-old male presenting today with a past medical history of acute liver failure, chronic alcohol abuse, gastritis, peptic ulcer disease, presenting with several hours of vomiting. A 59-year-old male presenting today with a past medical history of acute liver failure, chronic alcohol abuse, gastritis, peptic ulcer disease, presenting with several hours of vomiting. CT A/P wnl, hepatology consulted. Potentially 2/2 to small mattie-barragan tear. Hematemesis/emesis resolved and patient hemodynamically stable. Consider d/c on 10/23

## 2024-10-22 NOTE — PROGRESS NOTE ADULT - PROBLEM SELECTOR PLAN 3
pt w/ known liver disease iso of chronic alcohol use     - consult hepatology  - C/w cholestyramine  - hold miralax and lactulose, keep NPO iso of emesis pt w/ known liver disease iso of chronic alcohol use     - C/w cholestyramine  - hold miralax and lactulose, keep NPO iso of emesis

## 2024-10-22 NOTE — CHART NOTE - NSCHARTNOTEFT_GEN_A_CORE
No changes from hepatology standpoint  Email has been sent for outpatient follow up.  Please reach out with any further questions.    Griselda Hallman, PGY4  Gastroenterology and Hepatology  Available on TEAMS    For non urgent consult, please email [email or web address] (For [place]) or [email or web address] (For [facility])  Long range page number [phone]. Short range [phone]

## 2024-10-22 NOTE — PROGRESS NOTE ADULT - SUBJECTIVE AND OBJECTIVE BOX
*******************************  Lucia Toure MD (PGY-1)  Internal Medicine  Contact via [employer] TEAMS  *******************************      VANDANA VILLA  59y  MRN: [id number]    Patient is a 59y old  Male who presents with a chief complaint of Hematemesis (21 Oct 2024 18:00)      Interval/Overnight Events: no events ON.     Subjective: Pt seen and examined at bedside. Denies fever, CP, SOB, abn pain, N/V, dysuria. Tolerating diet.      MEDICATIONS  (STANDING):  acetaminophen     Tablet .. 650 milliGRAM(s) Oral every 8 hours  cefTRIAXone   IVPB 1000 milliGRAM(s) IV Intermittent every 24 hours  chlorhexidine 4% Liquid 1 Application(s) Topical <User Schedule>  cholestyramine Powder (Sugar-Free) 4 Gram(s) Oral three times a day  folic acid 1 milliGRAM(s) Oral daily  hydrOXYzine hydrochloride 25 milliGRAM(s) Oral three times a day  influenza   Vaccine 0.5 milliLiter(s) IntraMuscular once  lactulose Syrup 10 Gram(s) Oral every 8 hours  melatonin 3 milliGRAM(s) Oral at bedtime  multivitamin 1 Tablet(s) Oral daily  octreotide  Infusion 50 MICROgram(s)/Hr (10 mL/Hr) IV Continuous <Continuous>  pantoprazole  Injectable 40 milliGRAM(s) IV Push every 12 hours  pantoprazole  Injectable 40 milliGRAM(s) IV Push once  rifAXIMin 550 milliGRAM(s) Oral two times a day  thiamine 100 milliGRAM(s) Oral daily    MEDICATIONS  (PRN):  ondansetron   Disintegrating Tablet 4 milliGRAM(s) Oral every 8 hours PRN Nausea and/or Vomiting  sodium chloride 0.9% lock flush 10 milliLiter(s) IV Push every 1 hour PRN Pre/post blood products, medications, blood draw, and to maintain line patency      Objective:    Vitals: Vital Signs Last 24 Hrs  T(C): 37.2 (10-22-24 @ 04:26), Max: 37.2 (10-21-24 @ 20:30)  T(F): 99 (10-22-24 @ 04:26), Max: 99 (10-22-24 @ 04:26)  HR: 75 (10-22-24 @ 04:26) (66 - 88)  BP: 119/71 (10-22-24 @ 04:26) (119/71 - 138/92)  BP(mean): --  RR: 18 (10-22-24 @ 04:26) (18 - 20)  SpO2: 98% (10-22-24 @ 04:26) (95% - 100%)                I&O's Summary    21 Oct 2024 07:01  -  22 Oct 2024 07:00  --------------------------------------------------------  IN: 0 mL / OUT: 1200 mL / NET: -1200 mL        PHYSICAL EXAM:  GENERAL: NAD  HEAD:  Atraumatic, Normocephalic  EYES: EOMI, conjunctiva and sclera clear  CHEST/LUNG: Clear to auscultation bilaterally; No rales, rhonchi, wheezing, or rubs  HEART: Regular rate and rhythm; No murmurs, rubs, or gallops  ABDOMEN: Soft, Nontender, Nondistended;   SKIN: No rashes or lesions  NERVOUS SYSTEM:  Alert & Oriented X3, no focal deficits    LABS:                        9.4    5.41  )-----------( 178      ( 21 Oct 2024 00:49 )             28.1     10-    132[L]  |  104  |  10  ----------------------------<  87  4.2   |  17[L]  |  0.92    Ca    9.3      21 Oct 2024 00:49    TPro  9.6[H]  /  Alb  2.9[L]  /  TBili  14.3[H]  /  DBili  x   /  AST  93[H]  /  ALT  57[H]  /  AlkPhos  108  10-21    CAPILLARY BLOOD GLUCOSE        PT/INR - ( 21 Oct 2024 00:49 )   PT: 22.1 sec;   INR: 1.95 ratio         PTT - ( 21 Oct 2024 00:49 )  PTT:37.6 sec    Urinalysis Basic - ( 21 Oct 2024 03:07 )    Color: Dark Yellow / Appearance: Cloudy / S.011 / pH: x  Gluc: x / Ketone: Negative mg/dL  / Bili: Moderate / Urobili: 0.2 mg/dL   Blood: x / Protein: Negative mg/dL / Nitrite: Negative   Leuk Esterase: Negative / RBC: 4 /HPF / WBC 0 /HPF   Sq Epi: x / Non Sq Epi: 1 /HPF / Bacteria: Negative /HPF          RADIOLOGY & ADDITIONAL TESTS:         *******************************  Lucia Toure MD (PGY-1)  Internal Medicine  Contact via [employer] TEAMS  *******************************      VANDANA VILLA  59y  MRN: [id number]    Patient is a 59y old  Male who presents with a chief complaint of Hematemesis (21 Oct 2024 18:00)      Interval/Overnight Events: no events ON.     Subjective: Pt seen and examined at bedside. States that he did not have any episodes of emesis ON. 1 BM this morning, denies blood/diarrhea. Denies fever, CP, SOB, abn pain, N/V, dysuria. Tolerating diet.      MEDICATIONS  (STANDING):  acetaminophen     Tablet .. 650 milliGRAM(s) Oral every 8 hours  cefTRIAXone   IVPB 1000 milliGRAM(s) IV Intermittent every 24 hours  chlorhexidine 4% Liquid 1 Application(s) Topical <User Schedule>  cholestyramine Powder (Sugar-Free) 4 Gram(s) Oral three times a day  folic acid 1 milliGRAM(s) Oral daily  hydrOXYzine hydrochloride 25 milliGRAM(s) Oral three times a day  influenza   Vaccine 0.5 milliLiter(s) IntraMuscular once  lactulose Syrup 10 Gram(s) Oral every 8 hours  melatonin 3 milliGRAM(s) Oral at bedtime  multivitamin 1 Tablet(s) Oral daily  octreotide  Infusion 50 MICROgram(s)/Hr (10 mL/Hr) IV Continuous <Continuous>  pantoprazole  Injectable 40 milliGRAM(s) IV Push every 12 hours  pantoprazole  Injectable 40 milliGRAM(s) IV Push once  rifAXIMin 550 milliGRAM(s) Oral two times a day  thiamine 100 milliGRAM(s) Oral daily    MEDICATIONS  (PRN):  ondansetron   Disintegrating Tablet 4 milliGRAM(s) Oral every 8 hours PRN Nausea and/or Vomiting  sodium chloride 0.9% lock flush 10 milliLiter(s) IV Push every 1 hour PRN Pre/post blood products, medications, blood draw, and to maintain line patency      Objective:    Vitals: Vital Signs Last 24 Hrs  T(C): 37.2 (10-22-24 @ 04:26), Max: 37.2 (10-21-24 @ 20:30)  T(F): 99 (10-22-24 @ 04:26), Max: 99 (10-22-24 @ 04:26)  HR: 75 (10-22-24 @ 04:26) (66 - 88)  BP: 119/71 (10-22-24 @ 04:26) (119/71 - 138/92)  BP(mean): --  RR: 18 (10-22-24 @ 04:26) (18 - 20)  SpO2: 98% (10-22-24 @ 04:26) (95% - 100%)                I&O's Summary    21 Oct 2024 07:01  -  22 Oct 2024 07:00  --------------------------------------------------------  IN: 0 mL / OUT: 1200 mL / NET: -1200 mL        PHYSICAL EXAM:  GENERAL: NAD, jaundice   HEAD:  Atraumatic, Normocephalic  EYES: EOMI, conjunctiva and sclera clear  CHEST/LUNG: Clear to auscultation bilaterally; No rales, rhonchi, wheezing, or rubs  HEART: Regular rate and rhythm; No murmurs, rubs, or gallops  ABDOMEN: Soft, Nontender, Nondistended;   SKIN: No rashes or lesions; jaundiced  NERVOUS SYSTEM:  Alert & Oriented X3, no focal deficits    LABS:                        9.4    5.41  )-----------( 178      ( 21 Oct 2024 00:49 )             28.1     10-21    132[L]  |  104  |  10  ----------------------------<  87  4.2   |  17[L]  |  0.92    Ca    9.3      21 Oct 2024 00:49    TPro  9.6[H]  /  Alb  2.9[L]  /  TBili  14.3[H]  /  DBili  x   /  AST  93[H]  /  ALT  57[H]  /  AlkPhos  108  10-21    CAPILLARY BLOOD GLUCOSE        PT/INR - ( 21 Oct 2024 00:49 )   PT: 22.1 sec;   INR: 1.95 ratio         PTT - ( 21 Oct 2024 00:49 )  PTT:37.6 sec    Urinalysis Basic - ( 21 Oct 2024 03:07 )    Color: Dark Yellow / Appearance: Cloudy / S.011 / pH: x  Gluc: x / Ketone: Negative mg/dL  / Bili: Moderate / Urobili: 0.2 mg/dL   Blood: x / Protein: Negative mg/dL / Nitrite: Negative   Leuk Esterase: Negative / RBC: 4 /HPF / WBC 0 /HPF   Sq Epi: x / Non Sq Epi: 1 /HPF / Bacteria: Negative /HPF          RADIOLOGY & ADDITIONAL TESTS:

## 2024-10-22 NOTE — PROGRESS NOTE ADULT - PROBLEM SELECTOR PLAN 1
Hgb stable 9.4 and patient hemodynamically stable  Last episode at home prior to ED   potentially 2/2 to esophageal varices iso cirrhosis     - hepatology consult to see if endoscopy needed inpatient  - s/p octreotide 50mcg IVP, continue gtt, c/w ceftriaxone  - pantoprazole 40mg BID   - Trend Hgb Hgb stable 9.4 and patient hemodynamically stable; no emesis ON   Last episode at home prior to ED   potentially 2/2 to esophageal varices iso cirrhosis     - Per hepatology, no endoscopy inpatient   - pantoprazole 40mg BID   - Trend Hgb

## 2024-10-23 ENCOUNTER — TRANSCRIPTION ENCOUNTER (OUTPATIENT)
Age: 59
End: 2024-10-23

## 2024-10-23 VITALS
SYSTOLIC BLOOD PRESSURE: 125 MMHG | DIASTOLIC BLOOD PRESSURE: 77 MMHG | TEMPERATURE: 98 F | OXYGEN SATURATION: 98 % | RESPIRATION RATE: 18 BRPM | HEART RATE: 63 BPM

## 2024-10-23 LAB
ALBUMIN SERPL ELPH-MCNC: 2.7 G/DL — LOW (ref 3.3–5)
ALP SERPL-CCNC: 93 U/L — SIGNIFICANT CHANGE UP (ref 40–120)
ALT FLD-CCNC: 53 U/L — HIGH (ref 10–45)
ANION GAP SERPL CALC-SCNC: 12 MMOL/L — SIGNIFICANT CHANGE UP (ref 5–17)
AST SERPL-CCNC: 91 U/L — HIGH (ref 10–40)
BASOPHILS # BLD AUTO: 0 K/UL — SIGNIFICANT CHANGE UP (ref 0–0.2)
BASOPHILS NFR BLD AUTO: 0 % — SIGNIFICANT CHANGE UP (ref 0–2)
BILIRUB SERPL-MCNC: 14.2 MG/DL — HIGH (ref 0.2–1.2)
BUN SERPL-MCNC: 9 MG/DL — SIGNIFICANT CHANGE UP (ref 7–23)
CALCIUM SERPL-MCNC: 9.3 MG/DL — SIGNIFICANT CHANGE UP (ref 8.4–10.5)
CHLORIDE SERPL-SCNC: 105 MMOL/L — SIGNIFICANT CHANGE UP (ref 96–108)
CO2 SERPL-SCNC: 18 MMOL/L — LOW (ref 22–31)
CREAT SERPL-MCNC: 0.92 MG/DL — SIGNIFICANT CHANGE UP (ref 0.5–1.3)
EGFR: 97 ML/MIN/1.73M2 — SIGNIFICANT CHANGE UP
EOSINOPHIL # BLD AUTO: 0.23 K/UL — SIGNIFICANT CHANGE UP (ref 0–0.5)
EOSINOPHIL NFR BLD AUTO: 6 % — SIGNIFICANT CHANGE UP (ref 0–6)
GLUCOSE SERPL-MCNC: 90 MG/DL — SIGNIFICANT CHANGE UP (ref 70–99)
HCT VFR BLD CALC: 27.6 % — LOW (ref 39–50)
HGB BLD-MCNC: 9.2 G/DL — LOW (ref 13–17)
INR BLD: 2.05 RATIO — HIGH (ref 0.85–1.16)
LYMPHOCYTES # BLD AUTO: 1.42 K/UL — SIGNIFICANT CHANGE UP (ref 1–3.3)
LYMPHOCYTES # BLD AUTO: 37 % — SIGNIFICANT CHANGE UP (ref 13–44)
MAGNESIUM SERPL-MCNC: 1.7 MG/DL — SIGNIFICANT CHANGE UP (ref 1.6–2.6)
MANUAL SMEAR VERIFICATION: SIGNIFICANT CHANGE UP
MCHC RBC-ENTMCNC: 28.8 PG — SIGNIFICANT CHANGE UP (ref 27–34)
MCHC RBC-ENTMCNC: 33.3 GM/DL — SIGNIFICANT CHANGE UP (ref 32–36)
MCV RBC AUTO: 86.5 FL — SIGNIFICANT CHANGE UP (ref 80–100)
MELD SCORE WITH DIALYSIS: 38 POINTS — SIGNIFICANT CHANGE UP
MELD SCORE WITHOUT DIALYSIS: 25 POINTS — SIGNIFICANT CHANGE UP
MONOCYTES # BLD AUTO: 0.54 K/UL — SIGNIFICANT CHANGE UP (ref 0–0.9)
MONOCYTES NFR BLD AUTO: 14 % — SIGNIFICANT CHANGE UP (ref 2–14)
MYELOCYTES NFR BLD: 1 % — HIGH (ref 0–0)
NEUTROPHILS # BLD AUTO: 1.62 K/UL — LOW (ref 1.8–7.4)
NEUTROPHILS NFR BLD AUTO: 42 % — LOW (ref 43–77)
NRBC # BLD: 0 /100 WBCS — SIGNIFICANT CHANGE UP (ref 0–0)
PHOSPHATE SERPL-MCNC: 3.4 MG/DL — SIGNIFICANT CHANGE UP (ref 2.5–4.5)
PLAT MORPH BLD: NORMAL — SIGNIFICANT CHANGE UP
PLATELET # BLD AUTO: 179 K/UL — SIGNIFICANT CHANGE UP (ref 150–400)
POTASSIUM SERPL-MCNC: 4.3 MMOL/L — SIGNIFICANT CHANGE UP (ref 3.5–5.3)
POTASSIUM SERPL-SCNC: 4.3 MMOL/L — SIGNIFICANT CHANGE UP (ref 3.5–5.3)
PROT SERPL-MCNC: 8.7 G/DL — HIGH (ref 6–8.3)
PROTHROM AB SERPL-ACNC: 23.4 SEC — HIGH (ref 9.9–13.4)
RBC # BLD: 3.19 M/UL — LOW (ref 4.2–5.8)
RBC # FLD: 23.8 % — HIGH (ref 10.3–14.5)
RBC BLD AUTO: SIGNIFICANT CHANGE UP
SODIUM SERPL-SCNC: 135 MMOL/L — SIGNIFICANT CHANGE UP (ref 135–145)
WBC # BLD: 3.85 K/UL — SIGNIFICANT CHANGE UP (ref 3.8–10.5)
WBC # FLD AUTO: 3.85 K/UL — SIGNIFICANT CHANGE UP (ref 3.8–10.5)

## 2024-10-23 PROCEDURE — 82435 ASSAY OF BLOOD CHLORIDE: CPT

## 2024-10-23 PROCEDURE — 36415 COLL VENOUS BLD VENIPUNCTURE: CPT

## 2024-10-23 PROCEDURE — 84100 ASSAY OF PHOSPHORUS: CPT

## 2024-10-23 PROCEDURE — 85730 THROMBOPLASTIN TIME PARTIAL: CPT

## 2024-10-23 PROCEDURE — 86850 RBC ANTIBODY SCREEN: CPT

## 2024-10-23 PROCEDURE — 96375 TX/PRO/DX INJ NEW DRUG ADDON: CPT

## 2024-10-23 PROCEDURE — 74177 CT ABD & PELVIS W/CONTRAST: CPT | Mod: MC

## 2024-10-23 PROCEDURE — 84295 ASSAY OF SERUM SODIUM: CPT

## 2024-10-23 PROCEDURE — 86901 BLOOD TYPING SEROLOGIC RH(D): CPT

## 2024-10-23 PROCEDURE — 83605 ASSAY OF LACTIC ACID: CPT

## 2024-10-23 PROCEDURE — 81001 URINALYSIS AUTO W/SCOPE: CPT

## 2024-10-23 PROCEDURE — 82140 ASSAY OF AMMONIA: CPT

## 2024-10-23 PROCEDURE — 85025 COMPLETE CBC W/AUTO DIFF WBC: CPT

## 2024-10-23 PROCEDURE — 87086 URINE CULTURE/COLONY COUNT: CPT

## 2024-10-23 PROCEDURE — 82803 BLOOD GASES ANY COMBINATION: CPT

## 2024-10-23 PROCEDURE — 85014 HEMATOCRIT: CPT

## 2024-10-23 PROCEDURE — 85018 HEMOGLOBIN: CPT

## 2024-10-23 PROCEDURE — 99285 EMERGENCY DEPT VISIT HI MDM: CPT | Mod: 25

## 2024-10-23 PROCEDURE — 83735 ASSAY OF MAGNESIUM: CPT

## 2024-10-23 PROCEDURE — 80053 COMPREHEN METABOLIC PANEL: CPT

## 2024-10-23 PROCEDURE — 85610 PROTHROMBIN TIME: CPT

## 2024-10-23 PROCEDURE — G0480: CPT

## 2024-10-23 PROCEDURE — 82947 ASSAY GLUCOSE BLOOD QUANT: CPT

## 2024-10-23 PROCEDURE — 87040 BLOOD CULTURE FOR BACTERIA: CPT

## 2024-10-23 PROCEDURE — 71046 X-RAY EXAM CHEST 2 VIEWS: CPT

## 2024-10-23 PROCEDURE — 83690 ASSAY OF LIPASE: CPT

## 2024-10-23 PROCEDURE — 80307 DRUG TEST PRSMV CHEM ANLYZR: CPT

## 2024-10-23 PROCEDURE — 84132 ASSAY OF SERUM POTASSIUM: CPT

## 2024-10-23 PROCEDURE — 99239 HOSP IP/OBS DSCHRG MGMT >30: CPT

## 2024-10-23 PROCEDURE — 97161 PT EVAL LOW COMPLEX 20 MIN: CPT

## 2024-10-23 PROCEDURE — 82330 ASSAY OF CALCIUM: CPT

## 2024-10-23 PROCEDURE — 82962 GLUCOSE BLOOD TEST: CPT

## 2024-10-23 PROCEDURE — 86900 BLOOD TYPING SEROLOGIC ABO: CPT

## 2024-10-23 PROCEDURE — 96374 THER/PROPH/DIAG INJ IV PUSH: CPT

## 2024-10-23 RX ORDER — ONDANSETRON HYDROCHLORIDE 2 MG/ML
1 INJECTION, SOLUTION INTRAMUSCULAR; INTRAVENOUS
Qty: 21 | Refills: 0
Start: 2024-10-23 | End: 2024-10-29

## 2024-10-23 RX ORDER — HYDROXYZINE HCL 25 MG
1 TABLET ORAL
Qty: 90 | Refills: 0
Start: 2024-10-23 | End: 2024-11-21

## 2024-10-23 RX ADMIN — Medication 4 GRAM(S): at 09:13

## 2024-10-23 RX ADMIN — Medication 100 MILLIGRAM(S): at 13:03

## 2024-10-23 RX ADMIN — FOLIC ACID 1 MILLIGRAM(S): 1 TABLET ORAL at 13:03

## 2024-10-23 RX ADMIN — Medication 25 MILLIGRAM(S): at 05:36

## 2024-10-23 RX ADMIN — Medication 650 MILLIGRAM(S): at 05:36

## 2024-10-23 RX ADMIN — Medication 1 TABLET(S): at 13:03

## 2024-10-23 RX ADMIN — Medication 650 MILLIGRAM(S): at 13:06

## 2024-10-23 RX ADMIN — Medication 10 GRAM(S): at 05:36

## 2024-10-23 RX ADMIN — Medication 10 GRAM(S): at 13:05

## 2024-10-23 RX ADMIN — CHLORHEXIDINE GLUCONATE 1 APPLICATION(S): 40 SOLUTION TOPICAL at 09:14

## 2024-10-23 RX ADMIN — Medication 25 MILLIGRAM(S): at 13:06

## 2024-10-23 NOTE — PROCEDURE NOTE - NSASSISTBY_GEN_A_CORE
[facility]   Physical Therapy Treatment Note      Name: Eva Hair  Clinic Number: [id number]    Therapy Diagnosis:   Encounter Diagnoses   Name Primary?    Acute low back pain due to trauma Yes    Decreased range of motion of trunk and back      Physician: Renetta Salcedo NP    Visit Date: 10/25/2024    Visit Date: 10/14/2024  Physician Orders: PT Eval and Treat  Medical Diagnosis from Referral: M54.9 (ICD-10-CM) - Back pain, unspecified back location, unspecified back pain laterality, unspecified chronicity  Evaluation Date: 10/11/2024  Authorization Period Expiration: 9/30/2025  Plan of Care Expiration: 12/20/2024  Reassessment Due: 11/11/2024  Visit # / Visits authorized: 1/1 2/10     Precautions: Standard     PTA Visit #: 1/5     Time In: 327 PM   Time Out: 415 PM   Total Billable Time: 47 minutes    Subjective     Pt reports: some discomfort throughout entering Physical Therapy treatment today, however, patient doesn't know if increased pain is due to patient being tired and just getting off of work.      She was somewhat compliant with home exercise program.  Response to previous treatment: No Change - Initial Eval   Functional change: No Change - Initial Eval      Pain: 8/10  Location: chronic back pain     Objective      Objective Measures updated at progress report unless specified.     Treatment     Eva received the treatments listed below:      therapeutic exercises to develop strength, endurance, ROM, and core stabilization for 5 minutes including:     Scifit warm up 5 minutes      manual therapy techniques: The techniques below were applied for 8 minutes:      Lumbar Gapping Grade III-IV (L1-L2, L3-L4)   Breaking Bread Mobilization to Soft Tissue and Lumbar Spine      neuromuscular re-education activities to improve: Balance, Coordination, Kinesthetic, Sense, Proprioception, and Posture for 35 minutes. The following activities were included:     TA Activation with  Biofeedback 10 x 10 Sec   TA Activation with Biofeedback and Hip Fall Out 1 x 12   TA Activation with 4 Way Palloff Press RTB 4 Laps x 10 Sec Holds   Bridges RTB 3 x 8 3 Sec Holds (Cue for Glut Activation and No Lumbar Extension)   Side Lying Clamshells RTB 2 x 12 3 Sec Holds   Quadruped Cat/Cow 1 x 15 5 Sec Holds (Cue to Not Past Neutral)      therapeutic activities to improve functional performance for 0 minutes, including:     hot pack for 0 minutes to .     cold pack for 0 minutes to .    Patient Education and Home Exercises       Education provided:   - HEP   - Plan of Care   - Explanation of Findings      Written Home Exercises Provided: Patient instructed to cont prior HEP. Exercises were reviewed and Eva was able to demonstrate them prior to the end of the session.  Eva demonstrated good  understanding of the education provided. See EMR under Patient Instructions for exercises provided during therapy sessions    Assessment     Patient require moderate verbal cues throughout abdominal isometrics with cuff for proper core activation and not rotating hips too far out throughout bend knee fallout. Patient able to complete task with improved technique with cues. Patient with some tightness and tenderness throughout right lumbar spine throughout Soft Tissue Massage, however, patient able to tolerate full treatment. Patient informed Physical Therapist Assistant upon leaving Physical Therapy treatment, patient with some relief. Will continue to monitor and progress patient as tolerated.     Eva Is progressing well towards her goals.   Pt prognosis is Good.     Pt will continue to benefit from skilled outpatient physical therapy to address the deficits listed in the problem list box on initial evaluation, provide pt/family education and to maximize pt's level of independence in the home and community environment.     Pt's spiritual, cultural and educational needs considered and pt agreeable to plan of  care and goals.     Anticipated barriers to physical therapy: work schedule    Goals:     Short Term Goals: (4 weeks)  1. Patient will demonstrate good transverse abdominal muscle contraction for improved deep abdominal strength and lumbar stability.  2. Increase lumbar ROM to 100% of WNL in order to improve functional mobility.     3. Patient will demonstrate good sitting/standing posture and body mechanics for improved spine health and decreased risk of future injury.   4. Patient will improve bilateral lower extremity MMT grades by >/=1/2 grade in order to improve strength for standing ADLs.   5. Patient will score >/= 10 repetitions on 30-Second Sit to  order to improve bilateral lower extremity muscular power for transfers.   6. Patient will be compliant with home exercise program to supplement therapy in promoting functional mobility.     Long Term Goals (8 Weeks):   1. Patient will improve FOTO score to </= 66% functional ability, demonstrating improved perceived limitation with maintaining/changing body position.   2. Patient will be 100% compliant with updated HEP in order to maximize PT benefits post-discharge.  3. Patient will improve bilateral lower extremity MMT grades by >/=1 grade in order to improve strength for standing activities of daily living.  4. Patient will score >/= 14 repetitions on 30-Second Sit to  order to improve bilateral lower extremity muscular power for transfers.    5. Patient will report pain at worst over 2-week period as </=2/10 in order to improve general activity tolerance.   6. Pt will begin some form of home/community fitness in order to sustain progress gained in PT    Plan     Continue with Physical Therapist Plan of Care.     PT/PTA met face to face to discuss pt's treatment plan and progress towards established goals. Pt will be seen by a physical therapist minimally every 6th visit or every 30 days.    Prosper Castro PTA      Myself

## 2024-10-23 NOTE — DISCHARGE NOTE PROVIDER - NSDCCPCAREPLAN_GEN_ALL_CORE_FT
PRINCIPAL DISCHARGE DIAGNOSIS  Diagnosis: Hematemesis  Assessment and Plan of Treatment: You came to the ED because you were vomiting blood. Most likely there was blood in your vomit due to a small tear in your GI tract from forceful vomiting. You were initially started on medications that could help with bleeding which were eventually stopped. Your hemoglobin and blood pressure were stable during your hospital stay and you did not report any episodes of vomiting during your stay. You were also able to eat. Our liver team saw you and stated that ther was no need to do an endoscopy. Please follow up with your liver doctors and primary care provider 1-2 weeks from discharge. Please seek medical care if you experience bloody vomit, bloody diarrhea, fevers, abdominal distension, confusion, SOB, coughing, or other concerning symptoms.     PRINCIPAL DISCHARGE DIAGNOSIS  Diagnosis: Hematemesis  Assessment and Plan of Treatment: You came to the ED because you were vomiting blood. Most likely there was blood in your vomit due to a small tear in your GI tract from forceful vomiting. You were initially started on medications that could help with bleeding which were eventually stopped. Your hemoglobin and blood pressure were stable during your hospital stay and you did not report any episodes of vomiting during your stay. You were also able to eat. Our liver team saw you and stated that ther was no need to do an endoscopy. Please follow up with your liver doctors and primary care provider 1-2 weeks from discharge. Please seek immediate medical care if you experience bloody vomit, bloody diarrhea, fevers, abdominal distension, confusion, SOB, coughing, or other concerning symptoms.

## 2024-10-23 NOTE — PROGRESS NOTE ADULT - ATTENDING COMMENTS
Agree with above note by R1 Dr. Toure incl findings and plan, edited where appropriate  pt feels well at bedside, no further vomiting and no further hematemesis  per hepatology likely small Jacque Henning tear as opposed to variceal concern  stop octreotide/ceftriaxone/ppi - f/u final hepatology recs  anticipate dc planning soon if symptoms remain resolved  pt will need outpt hepatology f/u regardless  symptomatic tx for itching/elev bili  Attending Physician Dr. Jimi Blackmon - available on [employer] Teams
Agree with above note by R1 Dr. Toure incl findings and plan, edited where appropriate  seen with resident team  no vomiting, feels well although still itchy  d/c with atarax in addition to cholestyramine  outpt hepatology f/u - no plan for scope during admission  stable for dc to home  35 minutes spent orchestrating discharge  Attending Physician Dr. Jimi Blackmon - available on [employer] Teams

## 2024-10-23 NOTE — PROGRESS NOTE ADULT - PROBLEM SELECTOR PLAN 4
DVT PPx: Improve Score: hold iso if GI bleed   Diet: NPO iso of emesis   Bowel Regimen: hold iso of gi bleed   Code: Full  Dispo: PT/OT Pending Hospital Course  Pharmacy:  PCP:   Communication:     --------------------------------------------------------------  Lucia Toure MD  PGY-1, Internal Medicine  [employer] Teams preferred  --------------------------------------------------------------
DVT PPx: Improve Score: hold iso if GI bleed   Diet: NPO iso of emesis   Bowel Regimen: hold iso of gi bleed   Code: Full  Dispo: PT/OT Pending Hospital Course  Pharmacy:  PCP:   Communication:     --------------------------------------------------------------  Lucia Toure MD  PGY-1, Internal Medicine  [employer] Teams preferred  --------------------------------------------------------------

## 2024-10-23 NOTE — PROGRESS NOTE ADULT - PROBLEM SELECTOR PLAN 2
Pt reports last drink ~50 days ago     - C/w thiamine, multivitamin, folic acid  - Monitor for withdrawal symptoms
Pt reports last drink ~50 days ago     - C/w thiamine, multivitamin, folic acid  - Monitor for withdrawal symptoms

## 2024-10-23 NOTE — PHYSICAL THERAPY INITIAL EVALUATION ADULT - PERTINENT HX OF CURRENT PROBLEM, REHAB EVAL
59-year-old male with pmh of acute liver failure, chronic alcohol abuse, gastritis, peptic ulcer disease, prior withdrawal w/ delirium tremens and frequent hospital admissions w/ 1 prior ICU admission for hypoxic resp failure requiring intubation  presenting with several hours of vomiting. History provided by daughter on phone and patient at bedside. Pt states that he was eating as normal throughout the day; at night he had a "bucket" of emesis that was mostly food he ingested. Patient had 2nd episode of emesis that was larger and daughter stated that there was some blood and she noted that he was wiping blood off of his mouth. Pt denies abd pain, recent drinking, illnesses, blood in stool, diarrhea, sick contacts.    CT A/P: No acute pathology. CXR: Clear lungs.

## 2024-10-23 NOTE — PHYSICAL THERAPY INITIAL EVALUATION ADULT - NSACTIVITYREC_GEN_A_PT
Pt is functionally independent with no skilled PT needs identified. Pt is cleared for d/c from PT standpoint.

## 2024-10-23 NOTE — DISCHARGE NOTE PROVIDER - NSDCFUADDAPPT_GEN_ALL_CORE_FT
APPTS ARE READY TO BE MADE: [x] YES    Best Family or Patient Contact (if needed):    Additional Information about above appointments (if needed):    1: Transplant hepatology (called clinic - they are aware that pt is being d/c and has an appt on 10/23 as well. Currently looking for a time slot for late afternoon on 10/23 or 10/24). Hepatology service saw patient here and documented that email has been sent for f/u.   2:  PCP    Other comments or requests:    APPTS ARE READY TO BE MADE: [x] YES    Best Family or Patient Contact (if needed):    Additional Information about above appointments (if needed):    1: Transplant hepatology (called clinic - they are aware that pt is being d/c and has an appt on 10/23 as well. Currently looking for a time slot for late afternoon on 10/23 or 10/24). Hepatology service saw patient here and documented that email has been sent for f/u.   2:  PCP: Family unsure who current PCP is    Other comments or requests:    APPTS ARE READY TO BE MADE: [x] YES    Best Family or Patient Contact (if needed):    Additional Information about above appointments (if needed):    1: Transplant hepatology (called clinic - they are aware that pt is being d/c and has an appt on 10/23 as well. Currently looking for a time slot for late afternoon on 10/23 or 10/24). Hepatology service saw patient here and documented that email has been sent for f/u.   2:  PCP: Family unsure who current PCP is; stated will find out and call    Other comments or requests:    APPTS ARE READY TO BE MADE: [x] YES    Best Family or Patient Contact (if needed):    Additional Information about above appointments (if needed):    1: Transplant hepatology (called clinic - they are aware that pt is being d/c and has an appt on 10/23 as well. Currently looking for a time slot for late afternoon on 10/23 or 10/24). Hepatology service saw patient here and documented that email has been sent for f/u.   2:  PCP: Family unsure who current PCP is; stated will find out and call    Other comments or requests:     Patient advised they did not want to proceed with scheduling appointments with the providers on their referrals. They will coordinate care on their own.

## 2024-10-23 NOTE — DISCHARGE NOTE PROVIDER - HOSPITAL COURSE
HPI:  59-year-old male with pmh of acute liver failure, chronic alcohol abuse, gastritis, peptic ulcer disease, prior withdrawal w/ delirium tremens and frequent hospital admissions w/ 1 prior ICU admission for hypoxic resp failure requiring intubation  presenting with several hours of vomiting. History provided by daughter on phone and patient at bedside. Pt states that he was eating as normal throughout the day; at night he had a "bucket" of emesis that was mostly food he ingested. Patient had 2nd episode of emesis that was larger and daughter stated that there was some blood and she noted that he was wiping blood off of his mouth. Pt denies abd pain, recent drinking, illnesses, blood in stool, diarrhea, sick contacts.     Hospital course: Pt admitted for hematemesis for initial concern of esophageal varices considering history of cirrhosis. In ED, vitals: Temp Hemoglobin stable throughout hospital course. HPI:  59-year-old male with pmh of acute liver failure, chronic alcohol abuse, gastritis, peptic ulcer disease, prior withdrawal w/ delirium tremens and frequent hospital admissions w/ 1 prior ICU admission for hypoxic resp failure requiring intubation  presenting with several hours of vomiting. History provided by daughter on phone and patient at bedside. Pt states that he was eating as normal throughout the day; at night he had a "bucket" of emesis that was mostly food he ingested. Patient had 2nd episode of emesis that was larger and daughter stated that there was some blood and she noted that he was wiping blood off of his mouth. Pt denies abd pain, recent drinking, illnesses, blood in stool, diarrhea, sick contacts.     Hospital course: Pt admitted for hematemesis for initial concern of esophageal varices considering history of cirrhosis. In ED, vitals: Temp 98.5, HR 91, /89, O2 sat 100 on RA. Labs showing hemoglobin 9.5, WBC 5.4, CT A/P w/o acute pathology. Hemoglobin stable throughout hospital course and patient did not have any episodes of emesis in ED or inpatient. Patient initially started on octreotide, IV protonix, labetolol, and ceftriaxone and hepatology consulted to see if endoscopy warranted inpatient. Hepatology suspected small Jacque-barragan tear, no indication for endoscopy inpatient. In setting of resolved emesis, stable hemogobin, and hemodynamic stability, patient cleared for discharge on 10/23/2024.     NO MEDICATION CHANGES    Follow up:  1. PCP  2. Transplant Hepatology      HPI:  59-year-old male with pmh of acute liver failure, chronic alcohol abuse, gastritis, peptic ulcer disease, prior withdrawal w/ delirium tremens and frequent hospital admissions w/ 1 prior ICU admission for hypoxic resp failure requiring intubation  presenting with several hours of vomiting. History provided by daughter on phone and patient at bedside. Pt states that he was eating as normal throughout the day; at night he had a "bucket" of emesis that was mostly food he ingested. Patient had 2nd episode of emesis that was larger and daughter stated that there was some blood and she noted that he was wiping blood off of his mouth. Pt denies abd pain, recent drinking, illnesses, blood in stool, diarrhea, sick contacts.     Hospital course: Pt admitted for hematemesis for initial concern of esophageal varices considering history of cirrhosis. In ED, vitals: Temp 98.5, HR 91, /89, O2 sat 100 on RA. Labs showing hemoglobin 9.5, WBC 5.4, CT A/P w/o acute pathology. Hemoglobin stable throughout hospital course and patient did not have any episodes of emesis in ED or inpatient. Patient initially started on octreotide, IV protonix, labetolol, and ceftriaxone and hepatology consulted to see if endoscopy warranted inpatient. Hepatology suspected small Mattie-barragan tear, no indication for endoscopy inpatient. In setting of resolved emesis, stable hemogobin, and hemodynamic stability, patient cleared for discharge on 10/23/2024.     NO MEDICATION CHANGES    Follow up:  1. PCP  2. Transplant Hepatology     Discharge Diagnosis: suspected mattie barragan tear       HPI:  59-year-old male with pmh of acute liver failure, chronic alcohol abuse, gastritis, peptic ulcer disease, prior withdrawal w/ delirium tremens and frequent hospital admissions w/ 1 prior ICU admission for hypoxic resp failure requiring intubation  presenting with several hours of vomiting. History provided by daughter on phone and patient at bedside. Pt states that he was eating as normal throughout the day; at night he had a "bucket" of emesis that was mostly food he ingested. Patient had 2nd episode of emesis that was larger and daughter stated that there was some blood and she noted that he was wiping blood off of his mouth. Pt denies abd pain, recent drinking, illnesses, blood in stool, diarrhea, sick contacts.     Hospital course: Pt admitted for hematemesis for initial concern of esophageal varices considering history of cirrhosis. In ED, vitals: Temp 98.5, HR 91, /89, O2 sat 100 on RA. Labs showing hemoglobin 9.5, WBC 5.4, CT A/P w/o acute pathology. Hemoglobin stable throughout hospital course and patient did not have any episodes of emesis in ED or inpatient. Patient initially started on octreotide, IV protonix, labetolol, and ceftriaxone and hepatology consulted to see if endoscopy warranted inpatient. Hepatology suspected small Mattie-barragan tear, no indication for endoscopy inpatient. In setting of resolved emesis, stable hemogobin, and hemodynamic stability, patient cleared for discharge on 10/23/2024.     NO MEDICATION CHANGES    Follow up:  1. PCP  2. Transplant Hepatology     Discharge Diagnosis:   hematemesis suspected mattie barragan tear  hyponatremia due to cirrhosis  cirrhosis

## 2024-10-23 NOTE — DISCHARGE NOTE NURSING/CASE MANAGEMENT/SOCIAL WORK - NSDCFUADDAPPT_GEN_ALL_CORE_FT
APPTS ARE READY TO BE MADE: [x] YES    Best Family or Patient Contact (if needed):    Additional Information about above appointments (if needed):    1: Transplant hepatology (called clinic - they are aware that pt is being d/c and has an appt on 10/23 as well. Currently looking for a time slot for late afternoon on 10/23 or 10/24). Hepatology service saw patient here and documented that email has been sent for f/u.   2:  PCP: Family unsure who current PCP is; stated will find out and call    Other comments or requests:

## 2024-10-23 NOTE — DISCHARGE NOTE PROVIDER - NSDCCPTREATMENT_GEN_ALL_CORE_FT
PRINCIPAL PROCEDURE  Procedure: CT abdomen pelvis  Findings and Treatment: IMPRESSION:  No acute pathology.  --- End of Report ---

## 2024-10-23 NOTE — DISCHARGE NOTE PROVIDER - NSDCMRMEDTOKEN_GEN_ALL_CORE_FT
cholestyramine 4 g/9 g oral powder for reconstitution: 1 applicator orally 3 times a day  folic acid 1 mg oral tablet: 1 tab(s) orally once a day  HOME PHYSICAL THERAPY: Transplant Surgery  hydrOXYzine hydrochloride 25 mg oral tablet: 1 tab(s) orally 3 times a day as needed for Itching  lactulose 10 g/15 mL oral syrup: 45 milliliter(s) orally every 8 hours Titrate to 3-4 bowel movements a day  magnesium oxide 400 mg oral tablet: 1 tab(s) orally 2 times a day (with meals)  Multiple Vitamins oral tablet: 1 tab(s) orally once a day  pantoprazole 40 mg oral delayed release tablet: 1 tab(s) orally once a day (before a meal)  polyethylene glycol 3350 oral powder for reconstitution: 17 gram(s) orally once a day Titrate to 3-4 bowel movements a day  rifAXIMin 550 mg oral tablet: 1 tab(s) orally 2 times a day  ROLLING WALKER: Transplant Surgery  thiamine 100 mg oral tablet: 1 tab(s) orally once a day   cholestyramine 4 g/9 g oral powder for reconstitution: 1 applicator orally 3 times a day  folic acid 1 mg oral tablet: 1 tab(s) orally once a day  HOME PHYSICAL THERAPY: Transplant Surgery  lactulose 10 g/15 mL oral syrup: 45 milliliter(s) orally every 8 hours Titrate to 3-4 bowel movements a day  magnesium oxide 400 mg oral tablet: 1 tab(s) orally 2 times a day (with meals)  Multiple Vitamins oral tablet: 1 tab(s) orally once a day  pantoprazole 40 mg oral delayed release tablet: 1 tab(s) orally once a day (before a meal)  polyethylene glycol 3350 oral powder for reconstitution: 17 gram(s) orally once a day Titrate to 3-4 bowel movements a day  rifAXIMin 550 mg oral tablet: 1 tab(s) orally 2 times a day  ROLLING WALKER: Transplant Surgery  thiamine 100 mg oral tablet: 1 tab(s) orally once a day   cholestyramine 4 g/9 g oral powder for reconstitution: 1 applicator orally 3 times a day  folic acid 1 mg oral tablet: 1 tab(s) orally once a day  HOME PHYSICAL THERAPY: Transplant Surgery  lactulose 10 g/15 mL oral syrup: 45 milliliter(s) orally every 8 hours Titrate to 3-4 bowel movements a day  magnesium oxide 400 mg oral tablet: 1 tab(s) orally 2 times a day (with meals)  Multiple Vitamins oral tablet: 1 tab(s) orally once a day  ondansetron 4 mg oral tablet, disintegratin tab(s) orally every 8 hours as needed for Nausea and/or Vomiting  pantoprazole 40 mg oral delayed release tablet: 1 tab(s) orally once a day (before a meal)  polyethylene glycol 3350 oral powder for reconstitution: 17 gram(s) orally once a day Titrate to 3-4 bowel movements a day  rifAXIMin 550 mg oral tablet: 1 tab(s) orally 2 times a day  ROLLING WALKER: Transplant Surgery  thiamine 100 mg oral tablet: 1 tab(s) orally once a day

## 2024-10-23 NOTE — DISCHARGE NOTE PROVIDER - NSDCFUSCHEDAPPT_GEN_ALL_CORE_FT
Reed Gray  [facility]  HEPATOLOGY [address] [place] [address]   Scheduled Appointment: 10/23/2024     Reed Gray  [facility]  HEPATOLOGY [address] [place] [address]   Scheduled Appointment: 11/05/2024     Reed Gray  [facility]  HEPATOLOGY [address] [place] [address]   Scheduled Appointment: 11/20/2024

## 2024-10-23 NOTE — PROGRESS NOTE ADULT - SUBJECTIVE AND OBJECTIVE BOX
*******************************  Lucia Toure MD (PGY-1)  Internal Medicine  Contact via [employer] TEAMS  *******************************      VANDANA VILLA  59y  MRN: [id number]    Patient is a 59y old  Male who presents with a chief complaint of Hematemesis (22 Oct 2024 07:07)      Interval/Overnight Events: no events ON.     Subjective: Pt seen and examined at bedside. Denies fever, CP, SOB, abn pain, N/V, dysuria. Tolerating diet.      MEDICATIONS  (STANDING):  acetaminophen     Tablet .. 650 milliGRAM(s) Oral every 8 hours  chlorhexidine 4% Liquid 1 Application(s) Topical <User Schedule>  cholestyramine Powder (Sugar-Free) 4 Gram(s) Oral three times a day  folic acid 1 milliGRAM(s) Oral daily  hydrOXYzine hydrochloride 25 milliGRAM(s) Oral three times a day  influenza   Vaccine 0.5 milliLiter(s) IntraMuscular once  lactulose Syrup 10 Gram(s) Oral every 8 hours  melatonin 3 milliGRAM(s) Oral at bedtime  multivitamin 1 Tablet(s) Oral daily  pantoprazole  Injectable 40 milliGRAM(s) IV Push once  rifAXIMin 550 milliGRAM(s) Oral two times a day  thiamine 100 milliGRAM(s) Oral daily    MEDICATIONS  (PRN):  ondansetron   Disintegrating Tablet 4 milliGRAM(s) Oral every 8 hours PRN Nausea and/or Vomiting  sodium chloride 0.9% lock flush 10 milliLiter(s) IV Push every 1 hour PRN Pre/post blood products, medications, blood draw, and to maintain line patency      Objective:    Vitals: Vital Signs Last 24 Hrs  T(C): 37.1 (10-23-24 @ 04:37), Max: 37.8 (10-22-24 @ 12:38)  T(F): 98.7 (10-23-24 @ 04:37), Max: 100 (10-22-24 @ 12:38)  HR: 58 (10-23-24 @ 04:37) (58 - 84)  BP: 127/79 (10-23-24 @ 04:37) (127/79 - 147/89)  BP(mean): --  RR: 18 (10-23-24 @ 04:37) (18 - 18)  SpO2: 98% (10-23-24 @ 04:37) (98% - 99%)                I&O's Summary    22 Oct 2024 07:01  -  23 Oct 2024 07:00  --------------------------------------------------------  IN: 0 mL / OUT: 550 mL / NET: -550 mL        PHYSICAL EXAM:  GENERAL: NAD  HEAD:  Atraumatic, Normocephalic  EYES: EOMI, conjunctiva and sclera clear  CHEST/LUNG: Clear to auscultation bilaterally; No rales, rhonchi, wheezing, or rubs  HEART: Regular rate and rhythm; No murmurs, rubs, or gallops  ABDOMEN: Soft, Nontender, Nondistended;   SKIN: No rashes or lesions  NERVOUS SYSTEM:  Alert & Oriented X3, no focal deficits    LABS:                        8.2    3.76  )-----------( 178      ( 22 Oct 2024 10:35 )             24.6                         9.4    5.41  )-----------( 178      ( 21 Oct 2024 00:49 )             28.1     10-22    133[L]  |  106  |  10  ----------------------------<  206[H]  4.2   |  15[L]  |  0.92  10-21    132[L]  |  104  |  10  ----------------------------<  87  4.2   |  17[L]  |  0.92    Ca    8.8      22 Oct 2024 10:35  Ca    9.3      21 Oct 2024 00:49  Phos  3.3     10-22  Mg     1.6     10-22    TPro  8.1  /  Alb  2.5[L]  /  TBili  12.4[H]  /  DBili  x   /  AST  106[H]  /  ALT  54[H]  /  AlkPhos  87  10-22  TPro  9.6[H]  /  Alb  2.9[L]  /  TBili  14.3[H]  /  DBili  x   /  AST  93[H]  /  ALT  57[H]  /  AlkPhos  108  10-21    CAPILLARY BLOOD GLUCOSE      POCT Blood Glucose.: 147 mg/dL (22 Oct 2024 17:36)        Urinalysis Basic - ( 22 Oct 2024 10:35 )    Color: x / Appearance: x / SG: x / pH: x  Gluc: 206 mg/dL / Ketone: x  / Bili: x / Urobili: x   Blood: x / Protein: x / Nitrite: x   Leuk Esterase: x / RBC: x / WBC x   Sq Epi: x / Non Sq Epi: x / Bacteria: x          RADIOLOGY & ADDITIONAL TESTS:         *******************************  Lucia Toure MD (PGY-1)  Internal Medicine  Contact via [employer] TEAMS  *******************************      VANDANA VILLA  59y  MRN: [id number]    Patient is a 59y old  Male who presents with a chief complaint of Hematemesis (22 Oct 2024 07:07)      Interval/Overnight Events: no events ON.     Subjective: Pt seen and examined at bedside. States no episodes of emesis ON. Last BM yesterday, denies blood. Denies fever, CP, SOB, abn pain, N/V, dysuria. Tolerating diet.      MEDICATIONS  (STANDING):  acetaminophen     Tablet .. 650 milliGRAM(s) Oral every 8 hours  chlorhexidine 4% Liquid 1 Application(s) Topical <User Schedule>  cholestyramine Powder (Sugar-Free) 4 Gram(s) Oral three times a day  folic acid 1 milliGRAM(s) Oral daily  hydrOXYzine hydrochloride 25 milliGRAM(s) Oral three times a day  influenza   Vaccine 0.5 milliLiter(s) IntraMuscular once  lactulose Syrup 10 Gram(s) Oral every 8 hours  melatonin 3 milliGRAM(s) Oral at bedtime  multivitamin 1 Tablet(s) Oral daily  pantoprazole  Injectable 40 milliGRAM(s) IV Push once  rifAXIMin 550 milliGRAM(s) Oral two times a day  thiamine 100 milliGRAM(s) Oral daily    MEDICATIONS  (PRN):  ondansetron   Disintegrating Tablet 4 milliGRAM(s) Oral every 8 hours PRN Nausea and/or Vomiting  sodium chloride 0.9% lock flush 10 milliLiter(s) IV Push every 1 hour PRN Pre/post blood products, medications, blood draw, and to maintain line patency      Objective:    Vitals: Vital Signs Last 24 Hrs  T(C): 37.1 (10-23-24 @ 04:37), Max: 37.8 (10-22-24 @ 12:38)  T(F): 98.7 (10-23-24 @ 04:37), Max: 100 (10-22-24 @ 12:38)  HR: 58 (10-23-24 @ 04:37) (58 - 84)  BP: 127/79 (10-23-24 @ 04:37) (127/79 - 147/89)  BP(mean): --  RR: 18 (10-23-24 @ 04:37) (18 - 18)  SpO2: 98% (10-23-24 @ 04:37) (98% - 99%)                I&O's Summary    22 Oct 2024 07:01  -  23 Oct 2024 07:00  --------------------------------------------------------  IN: 0 mL / OUT: 550 mL / NET: -550 mL        PHYSICAL EXAM:  GENERAL: NAD, Jaundiced  HEAD:  Atraumatic, Normocephalic  EYES: EOMI, icteric  CHEST/LUNG: Clear to auscultation bilaterally; No rales, rhonchi, wheezing, or rubs  HEART: Regular rate and rhythm; No murmurs, rubs, or gallops  ABDOMEN: Soft, Nontender, Nondistended;   SKIN: No rashes or lesions, femoral cath on left leg   NERVOUS SYSTEM:  Alert & Oriented X3, no focal deficits    LABS:                        8.2    3.76  )-----------( 178      ( 22 Oct 2024 10:35 )             24.6                         9.4    5.41  )-----------( 178      ( 21 Oct 2024 00:49 )             28.1     10-22    133[L]  |  106  |  10  ----------------------------<  206[H]  4.2   |  15[L]  |  0.92  10-21    132[L]  |  104  |  10  ----------------------------<  87  4.2   |  17[L]  |  0.92    Ca    8.8      22 Oct 2024 10:35  Ca    9.3      21 Oct 2024 00:49  Phos  3.3     10-22  Mg     1.6     10-22    TPro  8.1  /  Alb  2.5[L]  /  TBili  12.4[H]  /  DBili  x   /  AST  106[H]  /  ALT  54[H]  /  AlkPhos  87  10-22  TPro  9.6[H]  /  Alb  2.9[L]  /  TBili  14.3[H]  /  DBili  x   /  AST  93[H]  /  ALT  57[H]  /  AlkPhos  108  10-21    CAPILLARY BLOOD GLUCOSE      POCT Blood Glucose.: 147 mg/dL (22 Oct 2024 17:36)        Urinalysis Basic - ( 22 Oct 2024 10:35 )    Color: x / Appearance: x / SG: x / pH: x  Gluc: 206 mg/dL / Ketone: x  / Bili: x / Urobili: x   Blood: x / Protein: x / Nitrite: x   Leuk Esterase: x / RBC: x / WBC x   Sq Epi: x / Non Sq Epi: x / Bacteria: x          RADIOLOGY & ADDITIONAL TESTS:

## 2024-10-23 NOTE — PHYSICAL THERAPY INITIAL EVALUATION ADULT - ADDITIONAL COMMENTS
Pt states lives with family in a private house with 3 steps to enter, independent with ADLs and ambulation. Owns walker.

## 2024-10-23 NOTE — DISCHARGE NOTE NURSING/CASE MANAGEMENT/SOCIAL WORK - NSDCVIVACCINE_GEN_ALL_CORE_FT
COVID-19, mRNA, LNP-S, PF, 100 mcg/ 0.5 mL dose (Moderna); 10-Jovan-2021 15:38; Reji Hernandez (RN); Modern[employer] [id number] (Exp. Date: 13-Jul-2021); IntraMuscular; Deltoid Right.; 0.5 milliLiter(s);   influenza, injectable, quadrivalent, preservative free; 31-Jan-2019 15:53; Ayaka Bynum (RN); [employer]; [id number] (Exp. Date: 30-Jun-2019); IntraMuscular; Deltoid Right.; 0.5 milliLiter(s); VIS (VIS Published: 07-Aug-2015, VIS Presented: 31-Jan-2019);   influenza, injectable, quadrivalent, preservative free; 10-Nov-2019 14:13; Laverne Lane (RN); [employer]; [id number] (Exp. Date: 30-Jun-2020); IntraMuscular; Deltoid Left.; 0.5 milliLiter(s); VIS (VIS Published: 15-Aug-2019, VIS Presented: 10-Nov-2019);   influenza, injectable, quadrivalent, preservative free; 13-Oct-2020 11:56; Kimberli Cornin (RN); [employer]; [id number] (Exp. Date: 30-Jun-2021); IntraMuscular; Deltoid Right.; 0.5 milliLiter(s); VIS (VIS Published: 15-Aug-2019, VIS Presented: 13-Oct-2020);   Pneumococcal conjugate vaccine 20-valent (PCV20), polysaccharide [id number] conjugate, adjuvant, preserv; 04-Oct-2024 21:44; Colby Mckeon (RN); Pfizer[employer] Inc; [id number] (Exp. Date: 04-May-2025); IntraMuscular; Deltoid Left.; 0.5 milliLiter(s); VIS (VIS Published: 12-May-2023, VIS Presented: 04-Oct-2024);   Td (adult) preservative free; 18-Jan-2020 20:04; Yulia Vance (RN); [employer]; [id number] (Exp. Date: 19-Jan-2021); IntraMuscular; Deltoid Right.; 0.5 milliLiter(s); VIS (VIS Published: 18-Jan-2020, VIS Presented: 18-Jan-2020);

## 2024-10-23 NOTE — DISCHARGE NOTE NURSING/CASE MANAGEMENT/SOCIAL WORK - PATIENT PORTAL LINK FT
You can access the FollowMyHealth Patient Portal offered by [facility] by registering at the following website: [email or web address]. By joining [facility]’s FollowMyHealth portal, you will also be able to view your health information using other applications (apps) compatible with our system.

## 2024-10-23 NOTE — DISCHARGE NOTE NURSING/CASE MANAGEMENT/SOCIAL WORK - FINANCIAL ASSISTANCE
[facility] provides services at a reduced cost to those who are determined to be eligible through [facility]’s financial assistance program. Information regarding [facility]’s financial assistance program can be found by going to [email or web address] or by calling [phone].

## 2024-10-23 NOTE — PROGRESS NOTE ADULT - ASSESSMENT
A 59-year-old male presenting today with a past medical history of acute liver failure, chronic alcohol abuse, gastritis, peptic ulcer disease, presenting with several hours of vomiting. CT A/P wnl, hepatology consulted. Potentially 2/2 to small mattie-barragan tear. Hematemesis/emesis resolved and patient hemodynamically stable. Consider d/c on 10/23 if hemoglobin stable.  A 59-year-old male presenting today with a past medical history of acute liver failure, chronic alcohol abuse, gastritis, peptic ulcer disease, presenting with several hours of vomiting likely 2/2 to mattie-barragan tear. CT A/P wnl, hepatology consulted. Hematemesis/emesis resolved and patient hemodynamically stable. Patient stable for d/c on 10/23.

## 2024-10-23 NOTE — PROGRESS NOTE ADULT - PROBLEM SELECTOR PLAN 1
Hgb stable 9.4 and patient hemodynamically stable; no emesis ON   Last episode at home prior to ED   potentially 2/2 to esophageal varices iso cirrhosis     - Per hepatology, no endoscopy inpatient   - pantoprazole 40mg BID   - Trend Hgb

## 2024-10-24 ENCOUNTER — TRANSCRIPTION ENCOUNTER (OUTPATIENT)
Age: 59
End: 2024-10-24

## 2024-10-24 NOTE — ED ADULT NURSE NOTE - PSH
Patient confirmed scheduled appointment:  Date: 10/31/24  Time: 2:30 pm  Visit type: ZULEMA CHAVIS  Provider: Brianne Baltazar  Location: virtual  Testing/imaging: n/a  Additional notes: n/a    No significant past surgical history

## 2024-10-25 LAB
LYSOSOMAL ACID LIPASE INTERPRETATION: SIGNIFICANT CHANGE UP
LYSOSOMAL ACID LIPASE, RESULT: 115 CD:384473389 — SIGNIFICANT CHANGE UP (ref 54–220)

## 2024-10-26 LAB
PETH 16:0/18:1: NEGATIVE NG/ML — SIGNIFICANT CHANGE UP
PETH 16:0/18:2: NEGATIVE NG/ML — SIGNIFICANT CHANGE UP
PETH COMMENTS: SIGNIFICANT CHANGE UP

## 2024-10-30 LAB
CULTURE RESULTS: SIGNIFICANT CHANGE UP
SPECIMEN SOURCE: SIGNIFICANT CHANGE UP

## 2024-10-30 NOTE — ED PROCEDURE NOTE - ATTENDING CONTRIBUTION TO CARE
Jon Chavez MD (Attending Physician):    I performed a history and physical exam of the patient and discussed their management with the resident/fellow/ACP/student. I have reviewed the resident/fellow/ACP/student note and agree with the documented findings and plan of care, except as noted. I have personally performed a substantive portion of the visit including all aspects of the medical decision making. My medical decision making and observations are found below. Please refer to any progress notes for updates on clinical course.    Please see procedure note.

## 2024-11-01 RX ORDER — HYDROXYZINE HYDROCHLORIDE 25 MG/1
25 TABLET ORAL 4 TIMES DAILY
Qty: 120 | Refills: 5 | Status: ACTIVE | COMMUNITY
Start: 2024-11-01 | End: 1900-01-01

## 2024-11-05 ENCOUNTER — LABORATORY RESULT (OUTPATIENT)
Age: 59
End: 2024-11-05

## 2024-11-05 ENCOUNTER — APPOINTMENT (OUTPATIENT)
Dept: HEPATOLOGY | Facility: CLINIC | Age: 59
End: 2024-11-05
Payer: MEDICAID

## 2024-11-05 VITALS
DIASTOLIC BLOOD PRESSURE: 76 MMHG | HEART RATE: 82 BPM | TEMPERATURE: 98.3 F | BODY MASS INDEX: 26.03 KG/M2 | OXYGEN SATURATION: 98 % | RESPIRATION RATE: 16 BRPM | SYSTOLIC BLOOD PRESSURE: 113 MMHG | HEIGHT: 66 IN | WEIGHT: 162 LBS

## 2024-11-05 DIAGNOSIS — M79.2 NEURALGIA AND NEURITIS, UNSPECIFIED: ICD-10-CM

## 2024-11-05 PROCEDURE — 99215 OFFICE O/P EST HI 40 MIN: CPT

## 2024-11-05 RX ORDER — ACAMPROSATE CALCIUM 333 MG/1
333 TABLET, DELAYED RELEASE ORAL EVERY 8 HOURS
Qty: 180 | Refills: 2 | Status: ACTIVE | COMMUNITY
Start: 2024-11-05 | End: 1900-01-01

## 2024-11-05 RX ORDER — GABAPENTIN 100 MG/1
100 CAPSULE ORAL 3 TIMES DAILY
Qty: 90 | Refills: 0 | Status: ACTIVE | COMMUNITY
Start: 2024-11-05 | End: 1900-01-01

## 2024-11-07 ENCOUNTER — TRANSCRIPTION ENCOUNTER (OUTPATIENT)
Age: 59
End: 2024-11-07

## 2024-11-08 LAB
ALBUMIN SERPL ELPH-MCNC: 2.7 G/DL
ALP BLD-CCNC: 133 U/L
ALT SERPL-CCNC: 49 U/L
ANION GAP SERPL CALC-SCNC: 16 MMOL/L
AST SERPL-CCNC: 127 U/L
BASOPHILS # BLD AUTO: 0.04 K/UL
BASOPHILS NFR BLD AUTO: 1 %
BILIRUB SERPL-MCNC: 10.3 MG/DL
BUN SERPL-MCNC: 8 MG/DL
CALCIUM SERPL-MCNC: 8.7 MG/DL
CHLORIDE SERPL-SCNC: 107 MMOL/L
CO2 SERPL-SCNC: 10 MMOL/L
CREAT SERPL-MCNC: 0.85 MG/DL
EGFR: 101 ML/MIN/1.73M2
EOSINOPHIL # BLD AUTO: 0.13 K/UL
EOSINOPHIL NFR BLD AUTO: 3.2 %
GLUCOSE SERPL-MCNC: 85 MG/DL
HCT VFR BLD CALC: 28.4 %
HGB BLD-MCNC: 9.6 G/DL
IMM GRANULOCYTES NFR BLD AUTO: 0.2 %
INR PPP: 1.68 RATIO
LYMPHOCYTES # BLD AUTO: 1.69 K/UL
LYMPHOCYTES NFR BLD AUTO: 41.2 %
MAN DIFF?: NORMAL
MCHC RBC-ENTMCNC: 29.5 PG
MCHC RBC-ENTMCNC: 33.8 G/DL
MCV RBC AUTO: 87.4 FL
MONOCYTES # BLD AUTO: 0.41 K/UL
MONOCYTES NFR BLD AUTO: 10 %
NEUTROPHILS # BLD AUTO: 1.82 K/UL
NEUTROPHILS NFR BLD AUTO: 44.4 %
PLATELET # BLD AUTO: 199 K/UL
POTASSIUM SERPL-SCNC: 4.3 MMOL/L
PROT SERPL-MCNC: 8.6 G/DL
PT BLD: 19.7 SEC
RBC # BLD: 3.25 M/UL
RBC # FLD: 21.2 %
SODIUM SERPL-SCNC: 133 MMOL/L
WBC # FLD AUTO: 4.1 K/UL

## 2024-11-08 RX ORDER — URSODIOL 500 MG/1
500 TABLET ORAL
Qty: 60 | Refills: 5 | Status: ACTIVE | COMMUNITY
Start: 2024-11-08 | End: 1900-01-01

## 2024-11-10 LAB
PETH 16:0/18:1: NORMAL
PETH 16:0/18:2: 55 NG/ML
PETH COMMENTS: NORMAL

## 2024-11-11 NOTE — DIETITIAN INITIAL EVALUATION ADULT. - DIET TYPE
Education Record    Learner:  Patient    Disease / Diagnosis: here for retacrit    Barriers / Limitations:  None    Method:  Brief focused, printed material and  reinforcement    General Topics:  Plan of care reviewed    Outcome: patient ambulatory. Arrived with wife. No complaints. Tolerated injection. Has next appts. Shows understanding. Discharged in stable condition      NGT feeding [id number], 1555kcal & 97gm protein

## 2024-11-14 ENCOUNTER — TRANSCRIPTION ENCOUNTER (OUTPATIENT)
Age: 59
End: 2024-11-14

## 2024-11-15 RX ORDER — LACTULOSE 10 G/15ML
10 SOLUTION ORAL 3 TIMES DAILY
Qty: 9 | Refills: 2 | Status: ACTIVE | COMMUNITY
Start: 2024-11-15 | End: 1900-01-01

## 2024-11-15 RX ORDER — RIFAXIMIN 550 MG/1
550 TABLET ORAL
Qty: 180 | Refills: 3 | Status: ACTIVE | COMMUNITY
Start: 2024-11-15 | End: 1900-01-01

## 2024-11-15 RX ORDER — CHOLESTYRAMINE 4 G/9G
4 POWDER, FOR SUSPENSION ORAL TWICE DAILY
Qty: 1 | Refills: 2 | Status: ACTIVE | COMMUNITY
Start: 2024-11-15 | End: 1900-01-01

## 2024-11-15 RX ORDER — LORATADINE 5 MG
17 TABLET,CHEWABLE ORAL
Qty: 1 | Refills: 5 | Status: DISCONTINUED | COMMUNITY
Start: 2024-11-15 | End: 2024-11-15

## 2024-11-20 ENCOUNTER — APPOINTMENT (OUTPATIENT)
Dept: HEPATOLOGY | Facility: CLINIC | Age: 59
End: 2024-11-20
Payer: MEDICAID

## 2024-11-20 VITALS
RESPIRATION RATE: 16 BRPM | SYSTOLIC BLOOD PRESSURE: 122 MMHG | BODY MASS INDEX: 26.84 KG/M2 | OXYGEN SATURATION: 100 % | HEART RATE: 87 BPM | WEIGHT: 167 LBS | HEIGHT: 66 IN | TEMPERATURE: 99.1 F | DIASTOLIC BLOOD PRESSURE: 83 MMHG

## 2024-11-20 PROCEDURE — 91200 LIVER ELASTOGRAPHY: CPT

## 2024-11-20 PROCEDURE — 99214 OFFICE O/P EST MOD 30 MIN: CPT

## 2024-11-20 RX ORDER — POLYETHYLENE GLYCOL 3350 17 G/17G
17 POWDER, FOR SOLUTION ORAL TWICE DAILY
Qty: 17 | Refills: 0 | Status: ACTIVE | COMMUNITY
Start: 2024-11-20 | End: 1900-01-01

## 2024-11-20 RX ORDER — GABAPENTIN 100 MG/1
100 CAPSULE ORAL 3 TIMES DAILY
Qty: 180 | Refills: 1 | Status: ACTIVE | COMMUNITY
Start: 2024-11-20 | End: 1900-01-01

## 2024-11-25 ENCOUNTER — APPOINTMENT (OUTPATIENT)
Dept: HEPATOLOGY | Facility: CLINIC | Age: 59
End: 2024-11-25

## 2024-11-25 DIAGNOSIS — K70.30 ALCOHOLIC CIRRHOSIS OF LIVER W/OUT ASCITES: ICD-10-CM

## 2024-11-25 LAB
ALBUMIN SERPL ELPH-MCNC: 2.9 G/DL
ALP BLD-CCNC: 130 U/L
ALT SERPL-CCNC: 31 U/L
ANION GAP SERPL CALC-SCNC: 12 MMOL/L
AST SERPL-CCNC: 58 U/L
BILIRUB SERPL-MCNC: 6.9 MG/DL
BUN SERPL-MCNC: 9 MG/DL
CALCIUM SERPL-MCNC: 9.7 MG/DL
CHLORIDE SERPL-SCNC: 105 MMOL/L
CHOLEST SERPL-MCNC: 150 MG/DL
CO2 SERPL-SCNC: 17 MMOL/L
CREAT SERPL-MCNC: 0.86 MG/DL
EGFR: 101 ML/MIN/1.73M2
GLUCOSE SERPL-MCNC: 82 MG/DL
HDLC SERPL-MCNC: 23 MG/DL
LDLC SERPL CALC-MCNC: 91 MG/DL
NONHDLC SERPL-MCNC: 127 MG/DL
POTASSIUM SERPL-SCNC: 4 MMOL/L
PROT SERPL-MCNC: 8.2 G/DL
SODIUM SERPL-SCNC: 134 MMOL/L
TRIGL SERPL-MCNC: 207 MG/DL

## 2024-11-27 ENCOUNTER — NON-APPOINTMENT (OUTPATIENT)
Age: 59
End: 2024-11-27

## 2024-11-27 LAB
ALPHA-1-FETOPROTEIN-L3: 15.1 %
ALPHA-1-FETOPROTEIN: 6.8 NG/ML

## 2024-11-30 NOTE — ED ADULT NURSE NOTE - CADM POA CENTRAL LINE
and plan and status of the problem list as documented. I seen the patient today this morning along with critical care team, have discussed with nursing staff, discussed with the respiratory therapist in detail and also discussed with Dr. Yandy Wasserman and operative findings noted, CT scan of the soft tissue neck and initial CT scan was seen and events noted since admission to ICU, ventilator setting reviewed the labs and culture data seen. Peritonsillar retropharyngeal abscess with extensive prevertebral extension. Status post surgery/incision and drainage yesterday. Postop respiratory insufficiency. According to Dr. Yandy Wasserman on examination there was no airway edema or compromise and vocal cord and airway was open and patent, she does have secretions in pharynx is resection she was very alert and awake and follows command, when I saw her her ventilator setting reviewed and she was on CPAP/pressure support of 5/8 with 30% FiO2 her tidal volumes were good between 350-500 and respiratory rate was 18, she did have a good leak on deflating the balloon, chest x-ray did not show any infiltrate or    Continue with IV fluids. Continue with current antibiotics and adjust antibiotic. Follow-up culture from incision and drainage. Extubate and discussed with respiratory therapist and she was extubated. Total critical care time caring for this patient with life threatening, unstable organ failure, including direct patient contact, management of life support systems, review of data including imaging and labs, discussions with other team members and physicians at least 28  Min so far today, excluding procedures. Gertrude Camcaho MD  11/25/2018 9:05 AM    Please note that this chart was generated using voice recognition Dragon dictation software. Although every effort was made to ensure the accuracy of this automated transcription, some errors in transcription may have occurred.
N/A Patient is under age 18 and does not have a history of high risk behavior or is not high risk for Hep C
No

## 2024-12-04 NOTE — ED ADULT TRIAGE NOTE - CADM TRG TX PRIOR TO ARRIVAL
Social Work/ Case Management Transition of Care Planning (GLADIS Fair [phone]):     Pt presented to the hospital with breakthrough seizure. SW met with Pt and mother at bedside. Pt lives in a one story house alone, but with family members staying with her 24/7. Pt does not drive/work. Pt's PCP is Dr. Sexton and pharmacy is [facility] in [place]. Pt has a hospital bed, WC, BSC, SC and cane, no preference in company used if additional DME is needed. Pt is active with [facility] and is agreeable to continue services at discharge. Pt has hx at [facility] in [place] but stated she will not discuss discharging to a SNF, she will only discharge home. Pt's family will transport home, family declined transportation assistance. RODOLFO/ADEBAYO to follow.   GLADIS Fair  12/4/2024      Case Management Assessment  Initial Evaluation    Date/Time of Evaluation: 12/4/2024 12:22 PM  Assessment Completed by: GLADIS Fair    If patient is discharged prior to next notation, then this note serves as note for discharge by case management.    Patient Name: Emerita Lea                   YOB: 1972  Diagnosis: Hypokalemia [E87.6]  Hypomagnesemia [E83.42]  Septicemia (HCC) [A41.9]  Transaminitis [R74.01]  Acute cystitis with hematuria [N30.01]  Sepsis (HCC) [A41.9]                   Date / Time: 12/2/2024 11:36 PM    Patient Admission Status: Inpatient   Readmission Risk (Low < 19, Mod (19-27), High > 27): Readmission Risk Score: 40    Current PCP: Jerry Sexton, DO  PCP verified by CM? Yes    Chart Reviewed: Yes      History Provided by: Patient, Child/Family  Patient Orientation: Alert and Oriented    Patient Cognition: Alert    Hospitalization in the last 30 days (Readmission):  Yes    If yes, Readmission Assessment in CM Navigator will be completed.    Advance Directives:      Code Status: Full Code   Patient's Primary Decision Maker is: Legal Next of Kin     none

## 2024-12-09 ENCOUNTER — APPOINTMENT (OUTPATIENT)
Dept: HEPATOLOGY | Facility: CLINIC | Age: 59
End: 2024-12-09

## 2024-12-09 DIAGNOSIS — K70.30 ALCOHOLIC CIRRHOSIS OF LIVER W/OUT ASCITES: ICD-10-CM

## 2024-12-09 PROCEDURE — 76981 USE PARENCHYMA: CPT

## 2024-12-11 NOTE — PROGRESS NOTE BEHAVIORAL HEALTH - MOOD
December 11, 2024       Brian Byrd MD  [address] [address]  Via In Basket      Patient: Kenya Horne   YOB: 1949   Date of Visit: 12/11/2024       Dear Dr. Byrd:    Thank you for referring Kenya Horne to me for evaluation. Below are my notes for this visit with her.    If you have questions, please do not hesitate to call me. I look forward to following your patient along with you.      Sincerely,        Jen Perea MD        CC: No Recipients   
Other

## 2024-12-12 NOTE — PROGRESS NOTE ADULT - ASSESSMENT
Assessment/Plan:   Diagnoses and all orders for this visit:    Status post total replacement of right hip       Reviewed physical exam with patient at time of visit. The patient is 6 week s/p Right total hip arthroplasty. She continues to progress as expected post-operatively. Continue formal physical therapy, per protocol. Continue weightbearing activities, as tolerated. The patient was provided a prophylactic antibiotic for dental procedures. He should continue to maintain hip precautions until 90 days post-surgery. She will follow-up in 6 weeks for re-evaluation, with repeat XR of her Right hip. The patient expresses understanding and is in agreement with today's treatment plan.    The patient is doing better in regards to his right total hip replacement.  He is still participating in home therapy.  Continue therapy till he plateaus.  Incision clean and dry.  Continue to precautions.  Follow-up 6 weeks for evaluation with new x-rays of right hip-2 views      Subjective:   Patient ID: MIRTA Baldwin  1957     HPI  Patient is a 66 y.o. male who presents for post-operative evaluation of his right hip. The patient is approximately 6 weeks s/p right total hip replacement, which was completed on 10/30/2024. He continues to use a cane for ambulation. He states that he continues to make improvements in strength. He states that he can stand without assistance. He has continued in home physical therapy as he is unable to drive. The patient does have occasional sharp pains in the groin. He denies numbness or tingling.      The following portions of the patient's history were reviewed and updated as appropriate:  Past medical history, past surgical history, Family history, social history, current medications and allergies    Past Medical History:   Diagnosis Date    Arthritis 2003    Bladder cancer (HCC)     BPH (benign prostatic hyperplasia)     Cancer (HCC) 2011    Bladder cancer    COPD (chronic obstructive  pulmonary disease) (HCC) 06/2020    Obstructive sleep apnoea    CPAP (continuous positive airway pressure) dependence     GERD (gastroesophageal reflux disease)     HL (hearing loss)     Hypertension     Nasal congestion     Neuropathy     Pneumonia 12/22/2022    Rheumatoid arthritis (HCC)     Sleep apnea     Sleep difficulties     Tinnitus     Umbilical hernia        Past Surgical History:   Procedure Laterality Date    COLONOSCOPY      CYSTOSCOPY      with bladder biopsy and resection of large bladder tumor- Dr. Castro     CYSTOSCOPY  11/09/2012, 08/03/2012, 06/04/2013, 12/21/2015, 6/23/2016, 1/20/2017, 2/9/2018    Diagnostic- Dr. Castro     ESOPHAGOGASTRODUODENOSCOPY      KNEE SURGERY Right     WY ARTHRP ACETBLR/PROX FEM PROSTC AGRFT/ALGRFT Right 10/30/2024    Procedure: ARTHROPLASTY HIP TOTAL;  Surgeon: Alex Flores DO;  Location: [facility];  Service: Orthopedics    WY ARTHRS KNE SURG W/MENISCECTOMY MED/LAT W/SHVG Left 10/19/2016    Procedure: KNEE ARTHROSCOPIC PARTIAL MEDIAL MENISCECTOMY ;  Surgeon: Juni Shelley MD;  Location: [facility];  Service: Orthopedics    WY CORRECTION HAMMERTOE Left 04/01/2022    Procedure: REPAIR HAMMERTOE RIGHT 3RD TOE;  Surgeon: Abraham Perkins DPM;  Location:  MAIN OR;  Service: Podiatry    WY EXC B9 LESION MRGN XCP SK TG T/A/L 2.1-3.0 CM Bilateral 11/01/2018    Procedure: LEFT PROXIMAL FOREARM EXCISIONAL BIOPSY OF SUBCUTANEOUS SOFT TISSUE MASS; BILATERAL OLECRANON BURSECTOMY;  Surgeon: Juni Shelley MD;  [facility];  Service: Orthopedics    WY EXC NEUROMA CUTAN NRV SURGLY IDENTIFIABLE Left 04/01/2022    Procedure: EXCISION NEUROMA RIGHT 2ND INTERSPACE;  Surgeon: Abraham Perkins DPM;  Location:  MAIN OR;  Service: Podiatry    WY TRURL ELECTROSURG RESCJ PROSTATE BLEED COMPLETE N/A 10/05/2018    Procedure: TRANSURETHRAL RESECTION OF PROSTATE (TURP);  Surgeon: Thierno Castro MD;  Location: [facility];  Service: Urology    PROSTATE SURGERY      UMBILICAL HERNIA REPAIR       54M w/ ETtOH abuse w/ DTs. ADmitted w/ alcohol intoxication and subsequent withdrawal. Intubated for acute hypoxemic respiratory failure likely secondary to aspiration,  extubated. Also had septic shock which has now resolved and ROSA.     ETOH withdrawel   - phenobarb 130 mg IVP every 6 hours, CIWA monitoring    fever   - 2/2 gram negative PNA . COVID-19 negative x 2; procal elevated  - zosyn x 7 days. ID has placed on Meropenum 1,000 every 8 hours as fevers persist for presumed aspiration pneumonia. CT chest shows   lower lobe pneumonia consistent with COVID. MVI/Thiamine/Folate. Doing well on 2L NC. attempt to ween tomorrow.   TOV soon in AM.       ROSA secondary to ATN, improving; phos repleted; monitor UOP and electrolytes  - remove saxena AM. WISDOM TOOTH EXTRACTION         Family History   Problem Relation Age of Onset    Heart disease Mother     Diabetes Father     Gout Brother     Heart disease Brother     Cancer Paternal Grandfather         Lung, bone marrow cancer    Cancer Maternal Uncle         Lung cancer ( mesothelioma), according to cousin       Social History     Socioeconomic History    Marital status:      Spouse name: None    Number of children: None    Years of education: None    Highest education level: None   Occupational History    None   Tobacco Use    Smoking status: Former     Current packs/day: 0.00     Average packs/day: 1 pack/day for 35.0 years (35.0 ttl pk-yrs)     Types: Cigarettes     Start date:      Quit date: 2012     Years since quittin.9    Smokeless tobacco: Never   Vaping Use    Vaping status: Never Used   Substance and Sexual Activity    Alcohol use: Not Currently     Alcohol/week: 3.0 standard drinks of alcohol     Types: 1 Glasses of wine, 1 Cans of beer, 1 Standard drinks or equivalent per week     Comment: beer while watching sports, wine sometimes with dinner    Drug use: No    Sexual activity: Not Currently     Partners: Female     Birth control/protection: Male Sterilization     Comment: Had a Vasectomy in my mid 30's   Other Topics Concern    None   Social History Narrative    None     Social Drivers of Health     Financial Resource Strain: Not on file   Food Insecurity: No Food Insecurity (10/30/2024)    Nursing - Inadequate Food Risk Classification     Worried About Running Out of Food in the Last Year: Not on file     Ran Out of Food in the Last Year: Not on file     Ran Out of Food in the Last Year: 1   Transportation Needs: No Transportation Needs (10/30/2024)    Nursing - Transportation Risk Classification     Lack of Transportation: Not on file     Lack of Transportation: 2   Physical Activity: Not on file   Stress: Not on file   Social Connections: Not on file   Intimate Partner  Violence: Unknown (10/30/2024)    Nursing IPS     Feels Physically and Emotionally Safe: Not on file     Physically Hurt by Someone: Not on file     Humiliated or Emotionally Abused by Someone: Not on file     Physically Hurt by Someone: 2     Hurt or Threatened by Someone: 2   Housing Stability: Unknown (10/30/2024)    Nursing: Inadequate Housing Risk Classification     Has Housing: Not on file     Worried About Losing Housing: Not on file     Unable to Get Utilities: Not on file     Unable to Pay for Housing in the Last Year: 2     Has Housin         Current Outpatient Medications:     acetaminophen (TYLENOL) 325 mg tablet, Take 2 tablets (650 mg total) by mouth every 6 (six) hours as needed for mild pain, Disp: , Rfl:     amLODIPine (NORVASC) 10 mg tablet, Take 10 mg by mouth daily in the early morning  , Disp: , Rfl:     ascorbic acid (VITAMIN C) 500 MG tablet, Take 1 tablet (500 mg total) by mouth 2 (two) times a day, Disp: 60 tablet, Rfl: 0    Coenzyme Q10 (CoQ-10) 100 MG capsule, Take 100 mg by mouth daily, Disp: , Rfl:     folic acid (FOLVITE) 1 mg tablet, Take 1 tablet (1 mg total) by mouth daily, Disp: 30 tablet, Rfl: 0    gabapentin (NEURONTIN) 300 mg capsule, Take 2 capsules (600 mg total) by mouth 2 (two) times a day, Disp: 120 capsule, Rfl: 0    lisinopril (ZESTRIL) 5 mg tablet, Take 1 tablet (5 mg total) by mouth daily Do not start before 2023., Disp: 30 tablet, Rfl: 0    Methotrexate, Anti-Rheumatic, (METHOTREXATE SC), Inject 0.8 mL under the skin every 7 days Instructed by Rheumatology to stop 7 days prior to surgery, Disp: , Rfl:     Multiple Vitamins-Minerals (multivitamin with minerals) tablet, Take 1 tablet by mouth daily, Disp: 30 tablet, Rfl: 0    multivitamin (THERAGRAN) TABS, Take 1 tablet by mouth daily, Disp: , Rfl:     omeprazole (PriLOSEC) 40 MG capsule, Take 40 mg by mouth daily with lunch, Disp: , Rfl:     saccharomyces boulardii (FLORASTOR) 250 mg capsule, Take 250 mg  by mouth daily at bedtime, Disp: , Rfl:     Simponi 50 MG/0.5ML SOAJ, Instructed by Rheumatology to stop 1 month prior to surgery, Disp: , Rfl:     Azelastine-Fluticasone 137-50 MCG/ACT SUSP, 1 spray into each nostril every 12 (twelve) hours (Patient not taking: Reported on 10/10/2024), Disp: , Rfl:     docusate sodium (COLACE) 100 mg capsule, Take 1 capsule (100 mg total) by mouth 2 (two) times a day, Disp: 60 capsule, Rfl: 0    enoxaparin (LOVENOX) 40 mg/0.4 mL, Inject 0.4 mL (40 mg total) under the skin in the morning for 14 days, Disp: 5.6 mL, Rfl: 0    ferrous sulfate 324 (65 Fe) mg, Take 1 tablet (324 mg total) by mouth 2 (two) times a day before meals, Disp: 60 tablet, Rfl: 0    methotrexate (PF) intrathecal injection, 8 mg by Intrathecal route weekly (Patient not taking: Reported on 10/10/2024), Disp: , Rfl:     Allergies   Allergen Reactions    Wasp Venom Anaphylaxis    American Cockroach Other (See Comments)     Allergy test done    Dog Epithelium (Canis Lupus Familiaris) Other (See Comments)     Allergy test was done  Also allergic to cat dander      Rinvoq [Upadacitinib] Dizziness       Review of Systems   Constitutional:  Negative for chills and fever.   HENT:  Negative for ear pain and sore throat.    Eyes:  Negative for pain and visual disturbance.   Respiratory:  Negative for cough and shortness of breath.    Cardiovascular:  Negative for chest pain and palpitations.   Gastrointestinal:  Negative for abdominal pain and vomiting.   Genitourinary:  Negative for dysuria and hematuria.   Musculoskeletal:  Negative for arthralgias and back pain.   Skin:  Negative for color change and rash.   Neurological:  Negative for seizures and syncope.   All other systems reviewed and are negative.       Objective:  /85   Pulse 69   Temp 98.2 °F (36.8 °C) (Temporal)   Ht 6' (1.829 m)   Wt 90.7 kg (200 lb)   SpO2 96%   BMI 27.12 kg/m²     Ortho Exam  right Hip -  Patient presents with no anatomical  54M w/ ETtOH abuse w/ DTs. ADmitted w/ alcohol intoxication and subsequent withdrawal. Intubated for acute hypoxemic respiratory failure likely secondary to aspiration,  extubated. Also had septic shock which has now resolved and ROSA.     ETOH withdrawel   - phenobarb 130 mg IVP every 6 hours, CIWA monitoring. Stop IV Pheno 4/13/20.     fever   - 2/2 gram negative PNA . COVID-19 negative x 2; procal elevated. ID has placed on Meropenum 1,000 every 8 hours as fevers persist for presumed aspiration pneumonia. Treatment started for COVID with CT findings, Plaquenil for 5 total days. CT chest shows lower lobe pneumonia consistent with COVID. MVI/Thiamine/Folate. Doing well on 2L NC. attempt to ween tomorrow.  Completed TOV.        Discharge home Monday after completes Meropenem and IV Pheno nieves schedule. deformity  Ambulates with antalgic gait pattern  Uses Single Point Cane assistive devices  Incision is well healed with no signs of drainage or infection  No tenderness upon palpation  Strength: 5/5 throughout  Knee flexor and extensor mechanism intact  Ankle DF and PF mechanism intact  2+ TP and DP pulses with brisk capillary refill to the toes  Sural, saphenous, tibial, superficial and deep peroneal motor and sensory distribution intact  Sensation to light touch intact distally    Physical Exam  HENT:      Head: Normocephalic and atraumatic.      Nose: Nose normal.   Eyes:      Conjunctiva/sclera: Conjunctivae normal.   Cardiovascular:      Rate and Rhythm: Normal rate.   Pulmonary:      Effort: Pulmonary effort is normal.   Musculoskeletal:      Cervical back: Neck supple.   Skin:     General: Skin is warm and dry.      Capillary Refill: Capillary refill takes less than 2 seconds.   Neurological:      Mental Status: He is alert and oriented to person, place, and time.   Psychiatric:         Mood and Affect: Mood normal.         Behavior: Behavior normal.          Diagnostic Test Review:  X-Ray of right hip obtained on 12/12/2024 were reviewed and demonstrate well-seated total hip prosthesis with maintained anatomical alignment and no signs of loosening.      Procedures   None performed.     Scribe Attestation      I,:  Brad Cummings am acting as a scribe while in the presence of the attending physician.:       I,:  Alex Flores, DO personally performed the services described in this documentation    as scribed in my presence.:

## 2024-12-12 NOTE — ED ADULT TRIAGE NOTE - STATUS:
As previously noted at her result notes labs should be rechecked in 2 to 3 months from starting supplement.  Orders placed for her for repeat labs and iron study  
Applied

## 2024-12-13 ENCOUNTER — APPOINTMENT (OUTPATIENT)
Dept: ULTRASOUND IMAGING | Facility: CLINIC | Age: 59
End: 2024-12-13
Payer: MEDICAID

## 2024-12-13 ENCOUNTER — OUTPATIENT (OUTPATIENT)
Dept: OUTPATIENT SERVICES | Facility: HOSPITAL | Age: 59
LOS: 1 days | End: 2024-12-13
Payer: MEDICAID

## 2024-12-13 DIAGNOSIS — K70.30 ALCOHOLIC CIRRHOSIS OF LIVER WITHOUT ASCITES: ICD-10-CM

## 2024-12-13 PROCEDURE — 93975 VASCULAR STUDY: CPT

## 2024-12-13 PROCEDURE — 93975 VASCULAR STUDY: CPT | Mod: 26

## 2024-12-18 ENCOUNTER — APPOINTMENT (OUTPATIENT)
Dept: HEPATOLOGY | Facility: CLINIC | Age: 59
End: 2024-12-18
Payer: MEDICAID

## 2024-12-18 VITALS
TEMPERATURE: 98.3 F | SYSTOLIC BLOOD PRESSURE: 126 MMHG | OXYGEN SATURATION: 100 % | HEIGHT: 66 IN | HEART RATE: 77 BPM | BODY MASS INDEX: 27 KG/M2 | DIASTOLIC BLOOD PRESSURE: 81 MMHG | RESPIRATION RATE: 16 BRPM | WEIGHT: 168 LBS

## 2024-12-18 DIAGNOSIS — M79.2 NEURALGIA AND NEURITIS, UNSPECIFIED: ICD-10-CM

## 2024-12-18 PROCEDURE — 99215 OFFICE O/P EST HI 40 MIN: CPT

## 2024-12-18 RX ORDER — CARVEDILOL 3.12 MG/1
3.12 TABLET, FILM COATED ORAL TWICE DAILY
Qty: 60 | Refills: 2 | Status: ACTIVE | COMMUNITY
Start: 2024-12-18 | End: 1900-01-01

## 2024-12-23 DIAGNOSIS — K70.30 ALCOHOLIC CIRRHOSIS OF LIVER W/OUT ASCITES: ICD-10-CM

## 2024-12-23 LAB
ALBUMIN SERPL ELPH-MCNC: 3.3 G/DL
ALP BLD-CCNC: 146 U/L
ALT SERPL-CCNC: 29 U/L
ANION GAP SERPL CALC-SCNC: 14 MMOL/L
AST SERPL-CCNC: 52 U/L
BASOPHILS # BLD AUTO: 0.04 K/UL
BASOPHILS NFR BLD AUTO: 1 %
BILIRUB SERPL-MCNC: 3 MG/DL
BUN SERPL-MCNC: 9 MG/DL
CALCIUM SERPL-MCNC: 9.3 MG/DL
CHLORIDE SERPL-SCNC: 112 MMOL/L
CO2 SERPL-SCNC: 14 MMOL/L
CREAT SERPL-MCNC: 0.91 MG/DL
EGFR: 98 ML/MIN/1.73M2
EOSINOPHIL # BLD AUTO: 0.22 K/UL
EOSINOPHIL NFR BLD AUTO: 5.2 %
GLUCOSE SERPL-MCNC: 124 MG/DL
HCT VFR BLD CALC: 32.9 %
HGB BLD-MCNC: 10.3 G/DL
IMM GRANULOCYTES NFR BLD AUTO: 0.2 %
INR PPP: 1.4 RATIO
LYMPHOCYTES # BLD AUTO: 2.23 K/UL
LYMPHOCYTES NFR BLD AUTO: 53 %
MAN DIFF?: NORMAL
MCHC RBC-ENTMCNC: 27.8 PG
MCHC RBC-ENTMCNC: 31.3 G/DL
MCV RBC AUTO: 88.7 FL
MONOCYTES # BLD AUTO: 0.32 K/UL
MONOCYTES NFR BLD AUTO: 7.6 %
NEUTROPHILS # BLD AUTO: 1.39 K/UL
NEUTROPHILS NFR BLD AUTO: 33 %
PLATELET # BLD AUTO: 192 K/UL
POTASSIUM SERPL-SCNC: 4.3 MMOL/L
PROT SERPL-MCNC: 8 G/DL
PT BLD: 16.6 SEC
RBC # BLD: 3.71 M/UL
RBC # FLD: 17.2 %
SODIUM SERPL-SCNC: 140 MMOL/L
WBC # FLD AUTO: 4.21 K/UL

## 2024-12-24 ENCOUNTER — APPOINTMENT (OUTPATIENT)
Dept: NEUROLOGY | Facility: CLINIC | Age: 59
End: 2024-12-24
Payer: MEDICAID

## 2024-12-24 VITALS
DIASTOLIC BLOOD PRESSURE: 79 MMHG | BODY MASS INDEX: 26.36 KG/M2 | WEIGHT: 164 LBS | OXYGEN SATURATION: 100 % | HEIGHT: 66 IN | SYSTOLIC BLOOD PRESSURE: 120 MMHG | HEART RATE: 73 BPM

## 2024-12-24 DIAGNOSIS — R29.898 OTHER SYMPTOMS AND SIGNS INVOLVING THE MUSCULOSKELETAL SYSTEM: ICD-10-CM

## 2024-12-24 DIAGNOSIS — M54.12 RADICULOPATHY, CERVICAL REGION: ICD-10-CM

## 2024-12-24 PROCEDURE — 99214 OFFICE O/P EST MOD 30 MIN: CPT

## 2025-01-07 NOTE — PATIENT PROFILE ADULT - HAS THE PATIENT EXPERIENCED ANY OF THE FOLLOWING WITHIN THE WEEK PRIOR TO ADMISSION?
Called patient and left voicemail regarding a sooner appointment with genetic.   Patient can call the office at [phone]   no

## 2025-01-10 ENCOUNTER — APPOINTMENT (OUTPATIENT)
Dept: MRI IMAGING | Facility: CLINIC | Age: 60
End: 2025-01-10
Payer: MEDICAID

## 2025-01-10 ENCOUNTER — OUTPATIENT (OUTPATIENT)
Dept: OUTPATIENT SERVICES | Facility: HOSPITAL | Age: 60
LOS: 1 days | End: 2025-01-10
Payer: MEDICAID

## 2025-01-10 DIAGNOSIS — R29.898 OTHER SYMPTOMS AND SIGNS INVOLVING THE MUSCULOSKELETAL SYSTEM: ICD-10-CM

## 2025-01-10 PROCEDURE — 72141 MRI NECK SPINE W/O DYE: CPT | Mod: 26

## 2025-01-10 PROCEDURE — 72141 MRI NECK SPINE W/O DYE: CPT

## 2025-01-17 NOTE — ED ADULT TRIAGE NOTE - BP NONINVASIVE DIASTOLIC (MM HG)
Ashley Sawyer  Alise Miles MD;  Location: [facility] OR;  Service: Orthopedics;  Laterality: Left;    CARPAL TUNNEL RELEASE Right 4/3/2024    Procedure: RIGHT RELEASE, CARPAL TUNNEL;  Surgeon: Alise Miles MD;  Location: [facility] OR;  Service: Orthopedics;  Laterality: Right;    HYSTEROSCOPIC POLYPECTOMY OF UTERUS N/A 8/2/2022     Procedure: POLYPECTOMY, UTERUS, HYSTEROSCOPIC;  Surgeon: Izzy Madera MD;  Location: [facility] OR;  Service: OB/GYN;  Laterality: N/A;    HYSTEROSCOPY WITH DILATION AND CURETTAGE OF UTERUS N/A 3/28/2023    Procedure: HYSTEROSCOPY, WITH DILATION AND CURETTAGE OF UTERUS;  Surgeon: Izzy Madera MD;  Location: [facility] OR;  Service: OB/GYN;  Izzy Madera MD;  Location: [facility] OR;  Service: OB/GYN;  11/08/2024    HGB 13.2 11/08/2024    HCT 40.5 11/08/2024    MCV 81 (L) 11/08/2024     11/08/2024     Lab Results   Component Value Date     (H) 06/12/2024     (L) 06/12/2024    K 4.2 06/12/2024     06/12/2024    CO2 24 06/12/2024    BUN 8 06/12/2024    CREATININE 0.8 06/12/2024    CALCIUM 9.2 06/12/02/15/2024 2/15/2023    Override on 2/15/2023: Done    COVID-19 Vaccine (4 - 2024-25 season) 09/01/2024 12/29/2021    Hemoglobin A1c 04/13/2025 1/13/2025    Diabetes Urine Screening 01/13/2026 1/13/2025    Lipid Panel 01/13/2026 1/13/2025    Foot Exam 01/17/2026 1/17/2025 (Done)    Override on 1/17/2025: Done    Override on 7/19/2023: Done    Override on 10/19/2022: Done    Cervical Cancer Screening 04/26/2029 4/26/2024    TETANUS VACCINE 05/31/2034 5/31/2024    RSV Vaccine (Age 60+ and Pregnant 112

## 2025-01-22 ENCOUNTER — APPOINTMENT (OUTPATIENT)
Dept: HEPATOLOGY | Facility: CLINIC | Age: 60
End: 2025-01-22
Payer: MEDICAID

## 2025-01-22 VITALS
HEART RATE: 81 BPM | BODY MASS INDEX: 27.64 KG/M2 | WEIGHT: 172 LBS | TEMPERATURE: 98.1 F | DIASTOLIC BLOOD PRESSURE: 81 MMHG | SYSTOLIC BLOOD PRESSURE: 147 MMHG | RESPIRATION RATE: 16 BRPM | OXYGEN SATURATION: 100 % | HEIGHT: 66 IN

## 2025-01-22 DIAGNOSIS — R39.9 UNSPECIFIED SYMPTOMS AND SIGNS INVOLVING THE GENITOURINARY SYSTEM: ICD-10-CM

## 2025-01-22 PROCEDURE — 99214 OFFICE O/P EST MOD 30 MIN: CPT

## 2025-01-22 NOTE — PROGRESS NOTE ADULT - NUTRITIONAL ASSESSMENT
This patient has been assessed with a concern for Malnutrition and has been determined to have a diagnosis/diagnoses of Moderate protein-calorie malnutrition.    This patient is being managed with:   Diet DASH/TLC-  Sodium & Cholesterol Restricted  Entered: Mar 23 2021  9:11AM    
[FreeTextEntry1] :  - The cause of osteoporosis most likely an early menopause however will rule out hormonal causes of osteoporosis including hyperparathyroidism, vitamin A levels. The DEXA scan in July 2024 shows that patient had severe osteoporosis at L1-L4 level with a T-score of -3.5. - will plan to start anabolic agent  romosozumab depending on coverage given severe osteoporosis - Patient without history of hypercalcemia or elevated alkaline phosphatase. No history of prior radiation or malignancy. No history of kidney stones or renal insufficiency. -Last labs shows patient has severe vitamin D deficiency, Will repeat the vitamin D levels today. - Discussed with patient to take vitamin D 50,000 international unit every week. - continue calcium 1200 mg daily and once she finished 50,000 national units of vitamin D she should start taking 1000 international unit of vitamin D.  Subclinical hypothyroidism: Patient was started on levothyroxine 25 mcg daily when the TSH was 9. She has stopped taking levothyroxine 25 mcg 2 weeks ago. Check TFTs and TPO antibodies. Will assess the need of starting levothyroxine based upon the TFTs.  RTC in 2 weeks as a video visit for lab result discussion and then in March 2025 
This patient has been assessed with a concern for Malnutrition and has been determined to have a diagnosis/diagnoses of Moderate protein-calorie malnutrition.    This patient is being managed with:   Diet DASH/TLC-  Sodium & Cholesterol Restricted  Entered: Mar 23 2021  9:11AM    

## 2025-01-23 RX ORDER — PANTOPRAZOLE 40 MG/1
40 TABLET, DELAYED RELEASE ORAL DAILY
Qty: 30 | Refills: 3 | Status: ACTIVE | COMMUNITY
Start: 2025-01-22 | End: 1900-01-01

## 2025-01-23 RX ORDER — SULFAMETHOXAZOLE AND TRIMETHOPRIM 800; 160 MG/1; MG/1
800-160 TABLET ORAL TWICE DAILY
Qty: 14 | Refills: 0 | Status: ACTIVE | COMMUNITY
Start: 2025-01-22 | End: 1900-01-01

## 2025-01-27 ENCOUNTER — APPOINTMENT (OUTPATIENT)
Facility: CLINIC | Age: 60
End: 2025-01-27

## 2025-01-27 DIAGNOSIS — K70.30 ALCOHOLIC CIRRHOSIS OF LIVER W/OUT ASCITES: ICD-10-CM

## 2025-01-27 LAB
ALBUMIN SERPL ELPH-MCNC: 3.4 G/DL
ALP BLD-CCNC: 131 U/L
ALT SERPL-CCNC: 17 U/L
ANION GAP SERPL CALC-SCNC: 12 MMOL/L
APPEARANCE: CLEAR
AST SERPL-CCNC: 36 U/L
BASOPHILS # BLD AUTO: 0.03 K/UL
BASOPHILS NFR BLD AUTO: 0.7 %
BILIRUB SERPL-MCNC: 1.5 MG/DL
BILIRUBIN URINE: NEGATIVE
BLOOD URINE: NEGATIVE
BUN SERPL-MCNC: 10 MG/DL
CALCIUM SERPL-MCNC: 9.5 MG/DL
CHLORIDE SERPL-SCNC: 108 MMOL/L
CO2 SERPL-SCNC: 19 MMOL/L
COLOR: YELLOW
CREAT SERPL-MCNC: 0.82 MG/DL
EGFR: 102 ML/MIN/1.73M2
EOSINOPHIL # BLD AUTO: 0.16 K/UL
EOSINOPHIL NFR BLD AUTO: 4 %
GLUCOSE QUALITATIVE U: NEGATIVE MG/DL
GLUCOSE SERPL-MCNC: 88 MG/DL
HCT VFR BLD CALC: 31.6 %
HGB BLD-MCNC: 10.2 G/DL
IMM GRANULOCYTES NFR BLD AUTO: 0 %
INR PPP: 1.21 RATIO
KETONES URINE: NEGATIVE MG/DL
LEUKOCYTE ESTERASE URINE: NEGATIVE
LYMPHOCYTES # BLD AUTO: 2.17 K/UL
LYMPHOCYTES NFR BLD AUTO: 53.8 %
MAN DIFF?: NORMAL
MCHC RBC-ENTMCNC: 26.8 PG
MCHC RBC-ENTMCNC: 32.3 G/DL
MCV RBC AUTO: 82.9 FL
MONOCYTES # BLD AUTO: 0.52 K/UL
MONOCYTES NFR BLD AUTO: 12.9 %
NEUTROPHILS # BLD AUTO: 1.15 K/UL
NEUTROPHILS NFR BLD AUTO: 28.6 %
NITRITE URINE: NEGATIVE
PETH 16:0/18:1: NEGATIVE NG/ML
PETH 16:0/18:2: NEGATIVE NG/ML
PETH COMMENTS: NORMAL
PH URINE: 6
PLATELET # BLD AUTO: 206 K/UL
POTASSIUM SERPL-SCNC: 4.4 MMOL/L
PROT SERPL-MCNC: 7.9 G/DL
PROTEIN URINE: NEGATIVE MG/DL
PT BLD: 14.2 SEC
RBC # BLD: 3.81 M/UL
RBC # FLD: 14.9 %
SODIUM SERPL-SCNC: 138 MMOL/L
SPECIFIC GRAVITY URINE: 1.01
UROBILINOGEN URINE: 0.2 MG/DL
WBC # FLD AUTO: 4.03 K/UL

## 2025-01-29 NOTE — BH CONSULTATION LIAISON PROGRESS NOTE - NSBHCHARTREVIEWVS_PSY_A_CORE FT
-Amlodipine increased to 10 mg every day today.   
-Not controlled with medrol dose pack.   -ortho referral for 2nd opinion ordered.   
-repeat UA w/ C+S done.   -CT abdo pelvis ordered.  
Vital Signs Last 24 Hrs  T(C): 36.3 (16 Nov 2023 11:38), Max: 37.1 (15 Nov 2023 16:53)  T(F): 97.4 (16 Nov 2023 11:38), Max: 98.8 (15 Nov 2023 16:53)  HR: 55 (16 Nov 2023 11:38) (55 - 90)  BP: 162/81 (16 Nov 2023 11:38) (132/79 - 162/81)  BP(mean): --  RR: 17 (16 Nov 2023 11:38) (17 - 18)  SpO2: 98% (16 Nov 2023 11:38) (95% - 98%)    Parameters below as of 16 Nov 2023 04:57  Patient On (Oxygen Delivery Method): room air

## 2025-02-10 ENCOUNTER — APPOINTMENT (OUTPATIENT)
Facility: CLINIC | Age: 60
End: 2025-02-10
Payer: MEDICAID

## 2025-02-10 DIAGNOSIS — R29.898 OTHER SYMPTOMS AND SIGNS INVOLVING THE MUSCULOSKELETAL SYSTEM: ICD-10-CM

## 2025-02-10 PROCEDURE — 95912 NRV CNDJ TEST 11-12 STUDIES: CPT

## 2025-02-10 PROCEDURE — 95886 MUSC TEST DONE W/N TEST COMP: CPT

## 2025-02-26 ENCOUNTER — APPOINTMENT (OUTPATIENT)
Dept: NEUROLOGY | Facility: CLINIC | Age: 60
End: 2025-02-26
Payer: MEDICAID

## 2025-02-26 VITALS
HEART RATE: 80 BPM | WEIGHT: 175 LBS | SYSTOLIC BLOOD PRESSURE: 134 MMHG | BODY MASS INDEX: 28.12 KG/M2 | RESPIRATION RATE: 16 BRPM | DIASTOLIC BLOOD PRESSURE: 73 MMHG | OXYGEN SATURATION: 100 % | HEIGHT: 66 IN

## 2025-02-26 DIAGNOSIS — G54.0 BRACHIAL PLEXUS DISORDERS: ICD-10-CM

## 2025-02-26 PROCEDURE — G2211 COMPLEX E/M VISIT ADD ON: CPT | Mod: NC

## 2025-02-26 PROCEDURE — 99214 OFFICE O/P EST MOD 30 MIN: CPT

## 2025-02-27 NOTE — CONSULT NOTE ADULT - SUBJECTIVE AND OBJECTIVE BOX
Upon review of the In Basket request and the patient's chart, initial outreach has been made via fax to facility. Please see Contacts section for details.     Thank you  Carlin Sutherland MA   
NEPHROLOGY CONSULTATION    CHIEF COMPLAINT:  Lactic acidosis      HPI:  Presented last evening with abdominal pain.  Patient has frequent admissions for alcohol intoxication and states last drink was 2 days ago.  Upon review of last few admissions his lactic acid is frequently elevated and comes down spontaneously.  He has had mild ROSA's in the past but on this admission his renal function is close to normal.  He was tachycardic in ER but no hypotension was noted.        ROS:  denies SOB;  admits to bilateral leg pain and shaking      PAST MEDICAL & SURGICAL HISTORY:  PUD (peptic ulcer disease)  Mixed hyperlipidemia  EtOH dependence  Gastritis  Substance abuse  DTs (delirium tremens)  Aspiration pneumonia    No significant past surgical history        SOCIAL HISTORY:  Alcohol dependence    FAMILY HISTORY:  NA      MEDICATIONS  (STANDING):  cefepime   IVPB 1000 milliGRAM(s) IV Intermittent every 8 hours  enoxaparin Injectable 40 milliGRAM(s) SubCutaneous daily  lactated ringers. 1000 milliLiter(s) (100 mL/Hr) IV Continuous <Continuous>  pantoprazole    Tablet 40 milliGRAM(s) Oral before breakfast  thiamine 100 milliGRAM(s) Oral daily      PHYSICAL EXAMINATION:  T(F): 98.7 (04-01-21 @ 11:38)  HR: 86 (04-01-21 @ 11:38)  BP: 155/88 (04-01-21 @ 11:38)  RR: 185 (04-01-21 @ 11:38)  SpO2: 95% (04-01-21 @ 11:38)  Conversant, shaking of legs noted  PERRLA, pink conjunctivae, no ptosis  Good dentition, no pharyngeal erythema  Neck non tender, no mass, no thyromegaly or nodules  Normal respiratory effort, lungs clear to auscultation  Heart with RRR, no murmurs or rubs, no peripheral edema  Abdomen soft, no masses, no organomegaly  Skin no rashes, ulcers or lesions, normal turgor and temperature  Anxious affect    LABS:                        12.7   7.17  )-----------( 448      ( 31 Mar 2021 23:54 )             39.5     03-31    142  |  102  |  14  ----------------------------<  100<H>  3.3<L>   |  19<L>  |  1.08    Anion gap 21    Ca    9.2      31 Mar 2021 23:54    TPro  9.0<H>  /  Alb  3.8  /  TBili  0.2  /  DBili  x   /  AST  35  /  ALT  31  /  AlkPhos  58  03-31    Blood alcohol 273    Lactate 10.6, 10.9    RADIOLOGY:  CT scan performed with IV contrast shows no acute pathology        ASSESSMENT:  1.  High anion gap metabolic acidosis due to alcoholic ketoacidosis and lactic acidosis;  lactate production can sometimes result in setting of alcohol intoxication due to redox shift and in fact occurs quite commonly in this patient;  but should always consider more common causes such as decreased tissue perfusion, hypovolemia, cardiac failure or sepsis    PLAN:  Check urine for ketones and serum beta hydroxybutyrate level  Check serum osmolal gap  Check urinalysis for calcium oxalate crystals;  doubt other toxic alcohol ingestion   Volume expand with D5NS + 20 meq/L KCl  Follow BMP, Mg, PO4 daily            
 56 YO male with PMH ETOH abuse w/ DTs with frequent hospital admissions, HLD, alcoholic gastritis/esophagitis/GERD/PUD & hx of hypoxic respiratory failure requiring intubation due to aspiration PNA, presents with abdominal pain.  CT A/P negative for acute pathology. Noted lactate of 10.9.  Admitted to medicine, ICU consulted for lactic acidosis.

## 2025-02-27 NOTE — ED PROVIDER NOTE - CADM POA URETHRAL CATHETER
Preop Patient Instructions for [facility] Surgeries and Procedures     Rojas ZACARIAS! We want you to have the best experience! Thank you for choosing us and trusting us with your care. You have an upcoming procedure scheduled at:    [facility] located at:  [address] [place] [address]    If you have any questions regarding your preop instructions, please call:  [phone] 8:00am-4:30pm M-F    Call your surgeon immediately if you feel that you cannot make it for procedure for any reason (i.e. cold symptoms, fever, family emergency etc.).    **Please do NOT follow the arrival time on your live well emili, it is incorrect and doing so may cause your procedure to be cancelled (we are working on the issue).**    A surgical estimate may be prepared for your upcoming procedure. If you have questions about the out-of-pocket amount due on the estimate, you may contact the Patient Contact Center for assistance at [phone] -option #4 Estimates.    ON THE DAY OF YOUR PROCEDURE  WHAT SHOULD I BRING?     Photo ID and insurance card  Please do not bring any valuables including jewelry, or weapons including pocket knives to the hospital.    Bring any asthma/COPD inhalers, eyeglasses, dentures, or hearing aids (bring cases)  Home medication list    IF you are staying overnight bring:  CPAP machine - if you use one for sleep apnea.  Self-care items such as hairbrush or hair tie.    WHEN SHOULD I ARRIVE?    Your arrival date/time to [facility] is 3-4-2025 at 12:10PM .   Please arrive 15 minutes before this time to allow time for checking in and registering. Main entrance opens at 5am, do not arrive before then.      You will ONLY receive a call the day before procedure ONLY if there is a change to your noted arrival time.      Free [facility] service is available Monday through Friday starting at 8am until 3:30pm.      WHERE DO I CHECK-IN?    Day Surgery - located on the 2nd floor.  ([phone])      TRANSPORTATION    IMPORTANT SAFETY! You must have a ride arranged to get home and have a responsible adult stay with you for 24 hours after the procedure. You cannot drive or use public transportation or rideshare by yourself. If you are unable to have someone with you the night of procedure, please notify your surgeon or proceduralist as soon as possible.      WHAT IS THE VISITOR POLICY?     Yes, two visitors are allowed due to space limitations. Additional visitors would be directed to the waiting area. More visitors may be allowed in the overnight units during visitor hours. Minor visitors must be always accompanied by an adult.  Masking is optional for patients, visitors, and the clinical team with exceptions for higher risk patients.  The clinical team has discretion to limit visitors if a visitor presents a health or safety risk to the patient, themselves, or the care team.  Visitor accommodations are subject to change depending on community infection concerns.      DIET INSTRUCTIONS    DO NOT EAT OR DRINK AFTER MIDNIGHT PRIOR TO YOUR PROCEDURE.   This includes hard candy, gum and mints. Eating after midnight could cause a delay or cancellation of your procedure/surgery.    Sips of water with any medications you were instructed to take the day of procedure is allowed.    EXCEPTION- you may have only water to drink UP TO 4 hours prior to your ARRIVAL time.       MEDICATION INSTRUCTIONS    Before Procedure Do Not Take These Medications    STOP herbal supplements, Vitamin E (and products containing Vitamin E), multivitamins, prenatal vitamins, Coenzyme Q10, and omega fish oil 1 week prior to procedure.     DO NOT take any vitamins, supplements or probiotics on the morning of the procedure.    DO NOT consume any products containing cannabis/CBD/marijuana for 3 days prior to your procedure. If you smoke marijuana daily- Do not smoke on the day of the procedure    STOP taking non-steroidal,  anti-inflammatory drugs, otherwise known as NSAIDS (Aleve, ibuprofen, Motrin, Advil and Naproxen) for at least 5 days, or longer per surgeons instructions.    If you smoke tobacco/vape, do not do this for at least 12 hours prior to your procedure.    DO NOT TAKE:    SHARAD Risk (Diuretics, ACE-I/ARB, NSAIDs):   - 5 days before surgery hold (ibuprofen (MOTRIN) 600 MG tablet [[id number]])    Continue taking the rest of your evening medications as scheduled, unless it is LISTED AS a medication to hold prior to procedure.     Continue all other medications unless an alternative plan was advised with the surgeon and/or specialist.      The morning of your procedure, take only the following medications  before coming to the hospital. If they are pills, you may take them with a small sip of water: IF NEEDED TYLENOL.         HOW TO DRESS, SHOWER and BATHE BEFORE PROCEDURE?   Hibiclens/CHG Instructions  Due to the procedure you are having, your surgeon has recommended for you to shower with medicated soap to help prevent surgical site infections. Please read the below instructions prior to using this soap. It is available over the counter     Before procedure, patients take an important role in prevention of a surgical site infection by using a special wash. A preoperative shower or bath with a special soap called chlorhexidine gluconate (CHG) 4% will need to be done. A common name for this soap is Hibiclens or Aplicare, but any of the mentioned brands are acceptable for use. If there is an allergy to CHG or for any other reason that washing with CHG is not possible, please follow the instructions below BUT use an antibacterial soap.  Wash genital area with regular soap or water only, NOT with CHG.  Turn off the water to prevent rinsing the CHG soap off too soon.  Apply CHG soap and leave on for one minute. Pay special attention to the area of procedure, be careful as this can cause the tub floor to be slippery.  If having  back procedure, please have someone assist in applying CHG to the back area.  Do not use regular soap after applying and rinsing CHG.  **PLEASE ALSO READ THE BELOW INSTRUCTIONS ABOUT SHOWERING AND AFTERCARE**    Patient Instructions for Skin Cleaning for baths or showers:  Please read the \"Drug Facts\" for CHG information and directions on the bottle:  Do not use on the head or face, eyes, ears or mouth.  Do not use in the genital area.  Do not use directly on stoma for ostomy.  Do not use if allergic to chlorhexidine gluconate or any other Ingredient listed. Instead use an antibacterial soap.  Steps for a Bath or Shower the Night Before and Morning of Procedure:    When bathing or showering wash hair as usual with regular shampoo.  Rinse hair and body thoroughly to remove any shampoo residue.  Wash face with regular soap or water only.  Thoroughly rinse body with warm water from the neck down.  Avoid any open skin areas including open wounds. Your physician should be made aware of these wounds and provide instructions for them.  Rinse thoroughly with warm water.  Pat dry with a fresh towel.  Do not apply lotion, powders or perfumes.  Do not wear jewelry, this also applies to permanent jewelry. If it is not removed it could result in a burn, or the cancellation of your procedure.  Do not wear makeup, including mascara.  Put on clean clothes and sleep in fresh bed sheets.  Sleeping with pet animals can be a source of infection.  *If a cast or splint is in place that is not to be removed, then sponge bathe exposed skin areas from the neck down.  Please brush and floss the night before and morning of your procedure with a new toothbrush.  DO NOT: shave any part of the surgical area for at least 2 days prior to procedure. Tiny skin cuts and abrasions from shaving in the surgical area can increase risk of infection.       EMAIL   No

## 2025-02-28 NOTE — PATIENT PROFILE ADULT - VISION (WITH CORRECTIVE LENSES IF THE PATIENT USUALLY WEARS THEM):
Date of Office Visit: 2025  Encounter Provider: ENZO Morton  Place of Service: [facility] CARDIOLOGY  Patient Name: Neha Guillory  :1931    Chief Complaint   Patient presents with    Hospital Follow Up Visit     1 mth- s/p Pacemaker Implant    Complete Heart Block   :     HPI: Neha Guillory is a 89 y.o. female who presented to ER on 2024 with complaints of an episode of syncope. Associated with blurred vision that then goes black. She was in kitchen using walker when most recent episod occurred causing her to fall to ground.      This is her third episode of syncope  in past 6 months; one in September and then 1 two weeks ago. At home she takes coreg 12.5 mg twice daily and norvasc 5 mg daily.     She was admitted for further work up.      She had an echo 2024 that showed EF of 55.7% with mild AV stenosis.      , while being monitored. She was seen to have an 8 second pause of ventricular standstill with symptoms of fatigue, dizziness and vision changes.      Lisandra and KAYE saw her on .  She had not had any further events but the episode the day before was very similar to what she had been experiencing at home.     Given her symptomatic AV block.  We recommended proceeding with dual-chamber pacemaker placement.    She had DC medtronic LB PPM placed on 2025.                     She presents today for follow up aptp.     Since pacemaker placement she has been doing well.     She has not had any further syncope or near syncope.     She has not had any more dizzy episodes.     Normal device testing and function. AP: 2%, RV: 5%.     She is not dependent. She does have intermittent AV block.      Past Medical History:   Diagnosis Date    Aortic stenosis, mild 2022    Arthritis     Carcinoma of upper-inner quadrant of left breast in female, estrogen receptor positive 2022    Carotid artery stenosis     Carotid atherosclerosis, bilateral 2019     Cataract     BILATERAL    Depression     Gastroenteritis     Generalized osteoarthritis 02/01/2016    H/O diastolic dysfunction     Hyperlipidemia     Hyperparathyroidism     Hyperparathyroidism     Hypertension     Hypertrophic cardiomyopathy     Iron deficiency anemia 10/13/2022    Low back pain May, 2023    Lower, right    Lumbosacral disc disease May 2023    Multifocal pneumonia 10/10/2022    Multifocal pneumonia 10/10/2022    Neuromuscular disorder     Obstructive sleep apnea syndrome 02/01/2016    Osteopenia     Osteoporosis     Pathological fracture of pelvis due to osteoporosis 01/24/2023    Primary familial hypertrophic cardiomyopathy 02/01/2016    Pubic ramus fracture, left, closed, initial encounter 01/23/2023    Pulmonary hypertension     Senile osteoporosis 02/07/2017    Synovial cyst of popliteal space 02/01/2016    Vitamin D deficiency        Past Surgical History:   Procedure Laterality Date    BREAST BIOPSY Left 02/17/2022    BREAST LUMPECTOMY Left     BREAST LUMPECTOMY WITH SENTINEL NODE BIOPSY Left 04/07/2022    Procedure: LEFT BREAST LUMPECTOMY WITH SENTINEL NODE BIOPSY AND NEEDLE LOCALIZATION;  Surgeon: eBl Marina MD;  Location: [facility] OR OSC;  Service: General;  Laterality: Left;    BUNIONECTOMY Right     FOOT SURGERY    CARDIAC ELECTROPHYSIOLOGY PROCEDURE N/A 1/7/2025    Procedure: Pacemaker DC new MEDT;  Surgeon: Donaldo Fry MD;  Location: [facility] CATH INVASIVE LOCATION;  Service: Cardiovascular;  Laterality: N/A;    COLONOSCOPY N/A 2011    Dr. Luis    EPIDURAL BLOCK  July 2023    Epidural x2    HAND SURGERY Right     TUMOR REMOVED ON FOREFINGER    PITUITARY SURGERY      THYROIDECTOMY Right 04/20/2016    Procedure: RIGHT SUPERIOR PARATHYROIDECTOMY;  Surgeon: Bel Marina MD;  Location: [facility] MAIN OR;  Service:        Social History     Socioeconomic History    Marital status:    Tobacco Use    Smoking status: Never     Passive exposure: Never    Smokeless  tobacco: Never   Vaping Use    Vaping status: Never Used   Substance and Sexual Activity    Alcohol use: Never    Drug use: Never    Sexual activity: Defer       Family History   Problem Relation Age of Onset    Diabetes Mother     Heart attack Mother     Arthritis Father     Breast cancer Sister     Breast cancer Sister     Breast cancer Sister     Colon cancer Sister     Heart disease Brother         CABG    Other Brother         BRAIN TUMOR    Colon cancer Brother     Colon cancer Brother     Colon cancer Brother     Colon cancer Maternal Grandfather     Cancer Other     Malig Hyperthermia Neg Hx        Review of Systems   Constitutional: Negative for chills, fever and malaise/fatigue.   Cardiovascular:  Negative for chest pain, dyspnea on exertion, leg swelling, near-syncope, orthopnea, palpitations, paroxysmal nocturnal dyspnea and syncope.   Respiratory:  Negative for cough and shortness of breath.    Hematologic/Lymphatic: Negative.    Musculoskeletal:  Negative for joint pain, joint swelling and myalgias.   Gastrointestinal:  Negative for abdominal pain, diarrhea, melena, nausea and vomiting.   Genitourinary:  Negative for frequency and hematuria.   Neurological:  Negative for light-headedness, numbness, paresthesias and seizures.   Allergic/Immunologic: Negative.    All other systems reviewed and are negative.      Allergies   Allergen Reactions    Amoxicillin Hives    Griseofulvin Ultramicrosize [Griseofulvin] Confusion    Tramadol Delirium    Atorvastatin Myalgia         Current Outpatient Medications:     amLODIPine (NORVASC) 5 MG tablet, Take 1 tablet by mouth Daily., Disp: 90 tablet, Rfl: 3    aspirin 81 MG EC tablet, Take 1 tablet by mouth Daily. Indications: Disease involving Lipid Deposits in the Arteries, Disp: , Rfl:     B Complex Vitamins (vitamin b complex) capsule capsule, Take 1 capsule by mouth Daily., Disp: , Rfl:     carvedilol (COREG) 12.5 MG tablet, TAKE ONE TABLET BY MOUTH EVERY 12  "HOURS, Disp: 180 tablet, Rfl: 3    cholecalciferol (VITAMIN D3) 1000 units tablet, Take 1 tablet by mouth Daily., Disp: , Rfl:     Ibuprofen 3 %, Gabapentin 10 %, Baclofen 2 %, lidocaine 4 %, Apply 1-2 g topically to the appropriate area as directed 3 (Three) to 4 (Four) times daily., Disp: 90 g, Rfl: 5    Lactobacillus (PROBIOTIC ACIDOPHILUS PO), Take 1 tablet by mouth Every Night., Disp: , Rfl:     mirtazapine (REMERON) 15 MG tablet, Take 1 tablet by mouth Every Night., Disp: 90 tablet, Rfl: 1    multivitamin (THERAGRAN) tablet tablet, Take  by mouth Daily. Gummies, Disp: , Rfl:     olmesartan (BENICAR) 20 MG tablet, TAKE 1 TABLET BY MOUTH DAILY, Disp: 90 tablet, Rfl: 3    vitamin C (ASCORBIC ACID) 250 MG tablet, Take 4 tablets by mouth Every Night., Disp: , Rfl:     Zinc 50 MG tablet, Take 1 tablet by mouth Every Night., Disp: , Rfl:       Objective:     Vitals:    02/28/25 1300   Pulse: 67   Weight: 55.8 kg (123 lb)   Height: 149.9 cm (59\")     Body mass index is 24.84 kg/m².    PHYSICAL EXAM:    Vitals Reviewed.   General Appearance: No acute distress, well developed and well nourished.   Respiratory: No signs of respiratory distress. Respiration rhythm and depth normal.   Cardiovascular:  Heart Rate and Rhythm: Normal, Heart Sounds: Normal S1 and S2. No S3 or S4 noted.  Skin: Warm and dry.   Psychiatric: Patient alert and oriented to person, place, and time. Speech and behavior appropriate. Normal mood and affect.       ECG 12 Lead    Date/Time: 2/28/2025 2:38 PM  Performed by: Herbie Espinosa APRN    Authorized by: Herbie Espinosa APRN  Comparison: compared with previous ECG   Similar to previous ECG  Rhythm: sinus rhythm            Assessment:       Diagnosis Plan   1. AV block, complete               Plan:   AV block---s/p DC PPM (1/2025)---- she is doing well post PPM. She has intermittent AV block so is intermittently dependent. Normal device testing and function, AP: 2%, RV: 5%, no events on device. "         Follow up in 3 months.         As always, it has been a pleasure to participate in your patient's care.      Sincerely,         ENZO Morton    Normal vision: sees adequately in most situations; can see medication labels, newsprint

## 2025-03-03 NOTE — PROGRESS NOTE ADULT - ASSESSMENT
Pt arrives ambulatory to triage.  Pt reports both of his \"pinky toes\" are burnings.  Pt reports his left big toe is very painful and numb.    Pt reports recent admission to the hospital, unable to say when.    Pt has fallen asleep in the lobby and missed his initial calls to room.  Pt reports problems is similar to when he first arrived.  
pt called to CXL apt for [facility]/Voicemail left  6mth f/U w/Dr. Sullivan of 8/10 @ 3:00 PM - she   feels nauseous and will call back to reschedule at a later date  
55M with ETOH Abuse, PUD, and recently discharged from [facility] for Pyelonephritis and ETOH Withdrawal readmitted for ETOH Withdrawal    ETOH Withdrawal  IV Ativan as per CECILIA  Continue Librium Taper  States he wants to quit but doesnt want help and wants to do it on his own  MVI + Folic Acid + Thiamine supplementation     GERD/PUD  This is the cause of his chest discomfort  Will place on Protonix daily + Maalox    Diet  Regular    DVT Prophylaxis  Lovenox    Disposition  Full Code/Inpatient  Discharge planning pending hospital course
55M with ETOH Abuse, PUD, and recently discharged from [facility] for Pyelonephritis and ETOH Withdrawal readmitted for ETOH Withdrawal    ETOH Withdrawal  IV Ativan as per CECILIA  Continue Librium Taper  States he wants to quit but doesnt want help and wants to do it on his own  MVI + Folic Acid + Thiamine supplementation     Abdominal Pain  Has PUD and on PPI  Rule out Pancreatitis  Will check Lipase and CT Abdomen     Diet  Regular    DVT Prophylaxis  Lovenox    Disposition  Full Code/Inpatient  Discharge planning pending hospital course
55M with ETOH Abuse, PUD, and recently discharged from [facility] for Pyelonephritis and ETOH Withdrawal readmitted for ETOH Withdrawal    ETOH Withdrawal  IV Ativan as per CECILIA  Continue Librium Taper  States he wants to quit but doesnt want help and wants to do it on his own  MVI + Folic Acid + Thiamine supplementation     Abdominal Pain  Has PUD and on PPI   no pancreatitis   Diet  Regular    DVT Prophylaxis  Lovenox    Disposition  Full Code/Inpatient  Discharge planning pending hospital course

## 2025-03-06 NOTE — PATIENT PROFILE ADULT - NSPROPTRIGHTREPNAME_GEN_A__NUR
[Alert] : alert [No Acute Distress] : no acute distress [EOMI] : extra ocular movement intact [Normal Hearing] : hearing was normal [No Respiratory Distress] : no respiratory distress [No Accessory Muscle Use] : no accessory muscle use [Normal Rate and Effort] : normal respiratory rate and effort [Clear to Auscultation] : lungs were clear to auscultation bilaterally [Normal S1, S2] : normal S1 and S2 [Normal Rate] : heart rate was normal [Pedal Pulses Normal] : the pedal pulses are present [Normal Bowel Sounds] : normal bowel sounds [Not Tender] : non-tender [Not Distended] : not distended [Soft] : abdomen soft [Spine Straight] : spine straight [No Stigmata of Cushings Syndrome] : no stigmata of Cushings Syndrome [Normal Gait] : normal gait [No Joint Swelling] : no joint swelling seen [Swelling] : swollen [Normal] : normal [2+] : 2+ in the dorsalis pedis [Oriented x3] : oriented to person, place, and time [Normal Insight/Judgement] : insight and judgment were intact [Kyphosis] : no kyphosis present [de-identified] : right thyroid lobe enlarged [de-identified] : 1+ edema to B/L ankles [FreeTextEntry1] : ruddiness/discoloration of foot [FreeTextEntry2] : + dry skin [FreeTextEntry4] : Monofilament testing reveals decreased sensation in ball of right foot [FreeTextEntry6] : + dry skin [FreeTextEntry8] : Monofilament testing reveals generally intact sensation Zane Valderrama

## 2025-03-18 NOTE — PROGRESS NOTE ADULT - ASSESSMENT
1.	Acute alcohol withdrawal in a patient with hx of chronic alcohol dependence and ongoing alcohol abuse. Multiple hospitalizations. at high risk for DTs and/or severe withdrawal. Given phenobarbitone x 1 and  now on scheduled oral phenobarbitone x 2 days with holding parameters. cont iv ativan prn. cont CIWA protocol. cont aspiration, seizure and fall precautions. follow labs. cont IVF. telemonitoring.   2.	chronic alcoholic hepatitis and leukopenia. advised quitting alcohol.   3.	Chronic low back pain. cont tylenol prn.     DVT Ppx: SCDs  Full Code. individual instruction

## 2025-03-31 NOTE — H&P ADULT - NSHPPOADEEPVENOUSTHROMB_GEN_A_CORE
[facility] - Outpatient Rehabilitation and Therapy: [address], [place] [address] office: [phone] fax: [phone]       Physical Therapy: TREATMENT/PROGRESS NOTE   Patient: Destinee Hayes (48 y.o. female)   Examination Date: 2025   :  1976 MRN: [id number]   Visit #: 2   Insurance Allowable Auth Needed   10 visits within 60 days of injury (until 25) before approval needed, C9 submitted [x]Yes - after 25     []No    Insurance: Payor: GENERIC MCO / Plan: GENERIC M[employer] WC / Product Type: *No Product type* /   Insurance ID:  - (Worker's Comp)  Secondary Insurance (if applicable): GENERIC SELF-INSURED   Treatment Diagnosis:   1. Shoulder impingement  M25.819         2. Shoulder joint dysfunction  M25.9         3. Acute pain of left shoulder  M25.512         4. Tendinitis of left shoulder  M77.8         5. Rotator cuff arthropathy, left  M12.812         6. Rotator cuff dysfunction, left  M67.912         7. Rotator cuff impingement syndrome of left shoulder  M75.42         8. Rotator cuff tendinitis, left  M75.82         Medical Diagnosis:  Injury of left shoulder, initial encounter [S49.92XA]  Rotator cuff tendinitis, left [M75.82]   Referring Physician: Shaun Hazel MD  PCP: Hattie Jurado MD     Plan of care signed (Y/N): y    Date of Patient follow up with Physician: 25     Plan of Care Report: NO  POC update due: (10 visits /OR AUTH LIMITS, whichever is less)  2025 ..WEEKLY DUE TO WORKERS COMP                                            Medical History:  Comorbidities:  None  Relevant Medical History: asthma, h/o Csection                                         Precautions/ Contra-indications:           Latex allergy:  NO  Pacemaker:    NO  Contraindications for Manipulation: None  Date of Surgery: na  Other:    Red Flags:  None    Suicide Screening:   The patient did not verbalize a primary behavioral concern, suicidal ideation, 
no

## 2025-04-01 ENCOUNTER — APPOINTMENT (OUTPATIENT)
Dept: MRI IMAGING | Facility: CLINIC | Age: 60
End: 2025-04-01

## 2025-04-02 NOTE — PRE PROCEDURE NOTE - PRE PROCEDURE EVALUATION
[facility] Consultation / Initial Visit      Today's Date: 4/2/2025    PCP: Milton Banda MD  Referring Provider: Inpatient consult to Cardiology  Consult performed by: Alondra Villagran CNP  Consult ordered by: Baron Alanis MD             INDICATION FOR CONSULTATION: Non-ST segment elevation myocardial infarction      HPI:       66 year old female presents with a chief complaint of abnormal labs.   The patient has the following cardiovascular/ medical conditions: Disease with recent percutaneous coronary intervention with IVUS guided stenting of the left anterior descending and right coronary artery with residual high-grade lesion of the second obtuse marginal, essential hypertension, hyperlipidemia, diabetes, fibromyalgia and obesity.    Patient presented emergency department after being referred by her primary care provider due to abnormal labs.  Was found to have markedly elevated serum blood glucoses in the 500s.  She had reportedly been noncompliant with diabetic diet as has recently been entertaining her grandchildren.  Reports heavy sugar consumption.    Additionally, at the time of evaluation, patient had laboratory work obtained including cardiac enzymes.  Enzymes noted to be elevated.  Due to enzyme elevation, our cardiology services was asked to evaluate.    Patient is known to our services as she has undergone percutaneous coronary intervention by my cardiology colleague, Dr. Diogo Cid.  Was seen by her primary cardiologist, Dr. Sebas Del Real, in outpatient setting after presenting with symptoms of exertional dyspnea with chest pain.  Was referred for coronary angiogram with findings significant for high-grade lesions of the left anterior descending, right coronary and second obtuse marginal arteries.  Underwent IVUS guided percutaneous coronary intervention of the left anterior descending and right coronary artery with plans for staged intervention of the second obtuse marginal.   Interventional Radiology    HPI: 59y Male with etoh cirrhosis presents to IR for therapeutic and diagnostic paracentesis.     Allergies: No Known Allergies    Medications (Abx/Cardiac/Anticoagulation/Blood Products)  labetalol: 200 milliGRAM(s) Oral (10-01 @ 07:16)  rifAXIMin: 550 milliGRAM(s) Oral (10-01 @ 07:15)    Data:    T(C): 37.3  HR: 70  BP: 109/55  RR: 18  SpO2: 98%    Exam  General: No acute distress  Chest: Non labored breathing  Abdomen: Non-distended  Extremities: No swelling, warm    -WBC 5.52 / HgB 9.0 / Hct 29.4 / Plt 158  -Na 142 / Cl 114 / BUN 14 / Glucose 86  -K 4.1 / CO2 15 / Cr 1.15  -ALT 80 / Alk Phos 136 / T.Bili 20.5  -INR1.73    Imaging: reviewed    Plan: 59y Male presents for Paracentesis  -Risks/Benefits/alternatives explained with the wife and daughter via telephone healthcare proxy and witnessed informed consent obtained.    Was scheduled previously but developed eye infection which elective procedure was canceled in which she was rescheduled for the near future.    She is currently asymptomatic.  No lightheadedness or dizziness.  No recurrent anginal symptoms.    ROS:     General: No fever or chills, No loss of appetite.    RS: No hemoptysis  GI: No melena or hematochezia  : No urinary disturbance  Derm: No skin disorders   Heme: No blood dyscrasias.  Remainder of 12 point systems reviewed and negative (or as mentioned in HPI)    Relevant Past Medical History:  Past Medical History:   Diagnosis Date    Anesthesia     no reaction    Arthritis     Chronic low back pain     CKD (chronic kidney disease)     Constipation     Coronary artery disease     COVID     Depression     Diabetes mellitus  (CMD)     Diabetic neuropathy  (CMD)     Eczema     Elevated liver enzymes     Essential (primary) hypertension     Fatty liver     Fibromyalgia     Fracture     R radius    Heart attack  (CMD)     found by accident on ekg    HLD (hyperlipidemia)     Hypercalcemia     Hypercholesterolemia     Lumbar radiculopathy     OA (osteoarthritis)     Obesity     Old MI (myocardial infarction)     Primary generalized (osteo)arthritis     Spinal stenosis     Steatosis of liver     Wears glasses       Past Surgical History:   Procedure Laterality Date    Colonoscopy diagnostic      over 10 yrs ago    Coronary angioplasty with stent placement      Tubal ligation Bilateral         Social History:  Social History     Tobacco Use   Smoking Status Former    Current packs/day: 0.00    Average packs/day: 0.3 packs/day for 20.0 years (5.0 ttl pk-yrs)    Types: Cigarettes    Start date:     Quit date: 2000    Years since quittin.2   Smokeless Tobacco Never   Tobacco Comments    quit 20 yrs ago     Social History     Substance and Sexual Activity   Alcohol Use Yes    Comment: occasionally     History   Drug Use Unknown       Family History:   Family  History   Problem Relation Age of Onset    Heart disease Mother     Myocardial Infarction Mother     Hypertension Mother     Kidney disease Father     Other Father         ruptured appendix    Patient is unaware of any medical problems Sister     Seizure Disorder Brother     Hydrocephalus Brother     Other Son         murderd    Diabetes Other        Inpatient Medications  Current Facility-Administered Medications   Medication Dose Route Frequency Provider Last Rate Last Admin    Potassium Standard Replacement Protocol (Levels 3.5 and lower)   Does not apply See Admin Instructions Dieter Scruggs MD        Magnesium Standard Replacement Protocol   Does not apply See Admin Instructions Dieter Scruggs MD        sodium chloride 0.9 % injection 2 mL  2 mL Intracatheter 2 times per day Dieter Scruggs MD   2 mL at 04/02/25 0919    insulin lispro (ADMELOG,HumaLOG) - Correction Dose   Subcutaneous Nightly Dieter Scruggs MD        insulin lispro (ADMELOG,HumaLOG) - Correction Dose   Subcutaneous TID WC Dieter Scruggs MD        aspirin chewable 81 mg  81 mg Oral Daily Dieter Scruggs MD   81 mg at 04/02/25 0918    erythromycin ophthalmic ointment   Left Eye 4x Daily Dieter Scruggs MD   Given at 04/02/25 0918    amLODIPine (NORVASC) tablet 10 mg  10 mg Oral Daily Dieter Scruggs MD   10 mg at 04/02/25 0919    clopidogrel (PLAVIX) tablet 75 mg  75 mg Oral Daily Dieter Scruggs MD   75 mg at 04/02/25 0919    hydrALAZINE (APRESOLINE) tablet 50 mg  50 mg Oral BID Dieter Scruggs MD   50 mg at 04/02/25 0918    indapamide (LOZOL) tablet 2.5 mg  2.5 mg Oral Daily Dieter Scruggs MD   2.5 mg at 04/02/25 0918    metoPROLOL succinate (TOPROL-XL) ER tablet 200 mg  200 mg Oral Daily Dieter Scruggs MD   200 mg at 04/02/25 0918    spironolactone (ALDACTONE) tablet 25 mg  25 mg Oral Daily Dieter Scruggs MD   25 mg at 04/02/25 0919      Current Facility-Administered Medications   Medication Dose Route Frequency Provider Last Rate Last Admin    heparin (porcine) 25,000  units/250 mL in dextrose 5 % infusion  1-30 Units/kg/hr (Order-Specific) Intravenous Continuous Baron Alanis MD 9.6 mL/hr at 04/01/25 1442 12 Units/kg/hr at 04/01/25 1442    lactated ringers infusion   Intravenous Continuous Dieter Scruggs  mL/hr at 04/01/25 2304 New Bag at 04/01/25 2304      Current Facility-Administered Medications   Medication Dose Route Frequency Provider Last Rate Last Admin    heparin (porcine) injection 4,000 Units  4,000 Units Intravenous PRN Baron Alanis MD        heparin (porcine) injection 2,000 Units  2,000 Units Intravenous PRN Baron Alanis MD        HYDROcodone-acetaminophen (NORCO) 5-325 MG per tablet 1 tablet  1 tablet Oral Q4H PRN Dieter Scruggs MD   1 tablet at 04/01/25 1643    acetaminophen (TYLENOL) tablet 650 mg  650 mg Oral Q4H PRN Dieter Scruggs MD        Or    acetaminophen (TYLENOL) suppository 650 mg  650 mg Rectal Q4H PRN Dieter Scruggs MD        ondansetron (ZOFRAN ODT) disintegrating tablet 4 mg  4 mg Oral Q12H PRN Dieter Scruggs MD        Or    ondansetron (ZOFRAN) injection 4 mg  4 mg Intravenous Q12H PRN Dieter Scruggs MD        polyethylene glycol (MIRALAX) packet 17 g  17 g Oral Daily PRN Dieter Scruggs MD        calcium carbonate (TUMS) chewable tablet 500 mg  500 mg Oral Q4H PRN Dieter Scruggs MD        sodium chloride 0.9 % injection 10 mL  10 mL Intravenous PRN Dieter Scruggs MD        sodium chloride (NORMAL SALINE) 0.9 % bolus 500 mL  500 mL Intravenous PRN Dieter Scruggs MD        dextrose 50 % injection 25 g  25 g Intravenous PRN Dieter Scruggs MD        dextrose 50 % injection 12.5 g  12.5 g Intravenous PRN Dieter Scruggs MD        glucagon (GLUCAGEN) injection 1 mg  1 mg Intramuscular PRN Dieter Scruggs MD        dextrose (GLUTOSE) 40 % gel 15 g  15 g Oral PRN Dieter Scruggs MD        dextrose (GLUTOSE) 40 % gel 30 g  30 g Oral PRN Dieter Scruggs MD        hydrALAZINE (APRESOLINE) injection 10 mg  10 mg Intravenous Q6H PRN Dieter Scruggs MD        gabapentin (NEURONTIN)  capsule 100 mg  100 mg Oral Q8H PRN Dieter Scruggs MD   100 mg at 04/01/25 2111        Allergy   ALLERGIES:   Allergen Reactions    Enalapril SWELLING and Other (See Comments)    Cvs Hydrating Ointment [Aquaphor] SWELLING     Emollient (Aquaphor) ointment- eye swelling , pus    Statins SWELLING            Examination:      Vital Last Value 24 Hour Range   Temperature 98.6 °F (37 °C) (04/02/25 0749) Temp  Min: 97.5 °F (36.4 °C)  Max: 98.6 °F (37 °C)   Pulse 80 (04/02/25 0749) Pulse  Min: 71  Max: 95   Respiratory 18 (04/02/25 0749) Resp  Min: 14  Max: 19   Non-Invasive  Blood Pressure (!) 143/84 (04/02/25 0749) BP  Min: 134/70  Max: 169/98   Pulse Oximetry 99 % (04/01/25 2302) SpO2  Min: 98 %  Max: 100 %   Arterial   Blood Pressure   No data recorded     Tele: Normal sinus rhythm    No intake or output data in the 24 hours ending 04/02/25 1007     Weight    04/01/25 1123 04/01/25 2000 04/02/25 0749   Weight: 79.8 kg (176 lb) 78.8 kg (173 lb 11.6 oz) 78.5 kg (173 lb)         GENERAL: No apparent distress  HEENT: Normocephalic.  Neck:  Supple neck.   Oral mucosa : Pink and moist.    Endocrine: There is no goiter.  CVS: Normal first and second heart tones.   Lung fields: Clear to auscultation bilaterally.   GI: Soft. Nontender, nondistended.    Lower extremity: No cyanosis, clubbing or edema.   Peripheral vascular: Both lower extremities are warm and well perfused.    Neuro: Awake and alert.  Nonfocal examination.  Psych: Appropriate mood and affect  Integumentary: Warm and Dry      Clinical Data:       The following were personally visualized and interpreted by me:    LABS:    CBC  Recent Labs   Lab 04/02/25  0314 04/01/25  1425 04/01/25  1129   WBC 10.9 13.7* 15.0*   HCT 35.0* 35.8* 36.7   HGB 12.9 13.3 13.7    305 358       CMP  Recent Labs   Lab 04/02/25  0314 04/01/25  1129 03/31/25  1038   SODIUM 133* 130* 127*   POTASSIUM 3.8 4.5 4.9   CHLORIDE 98 94* 96*   CO2 26 24 20*   GLUCOSE 196* 416* 489*   BUN 33*  40* 48*   CREATININE 1.28* 1.55* 1.65*   CALCIUM 10.7* 11.4* 11.8*   TOTPROTEIN 7.3  --   --    ALBUMIN 3.2*  --  4.1   BILIRUBIN 0.4  --   --    AST 27  --   --    GPT 30  --   --    ALKPT 79  --   --        Cardiac Labs  Recent Labs   Lab 25  1425 25  1129   HTROPI 798* 745*       Lipid Panel  No results found    Coags  Recent Labs   Lab 25  0314 25  2132 25  1425   INR  --   --  1.0   PTT 51* 51* 24       ABG  No results found    IMAGING:    ECG:   Encounter Date: 25   Electrocardiogram 12-Lead    Narrative    Transcribed from verified EKG tracing.  (Verified EKG tracing= the machine generated interpretation or clinician written interpretation)    Transcribe the verified EKG tracing:  agreed        See scanned result for details.           Chest x-ray:  FINDINGS with impression:  Nothing acute. Coronary artery stents. Aortic atherosclerotic  calcification. Otherwise normal chest.      Echocardiogram:  Results for orders placed during the hospital encounter of 24    TRANSTHORACIC ECHO (TTE) COMPLETE W/ W/O IMAGING AGENT    Narrative  *[facility]*  [address] [address] [phone]  Transthoracic Echocardiogram (TTE)    Patient: Danyell Grissom     Study Date/Time:       Sep 17 2024 4:13PM  MRN:     [id number]              FIN#:                  [id number]  :     1958           Ht/Wt:                 157cm 78kg  Age:     66                   BSA/BMI:               1.88m^2 31.6kg/m^2  Gender:  F                    Baseline BP:           157 / 78  Ordering Physician:     Diogo Gupta MD    Referring Physician:    Sebas Del Real MD    Attending Physician:    Shawnee Montgomery    Diagnostic Physician:   Warner Faria MD  Sonographer:            Juan BIANCHI    ------------------------------------------------------------------------------  INDICATIONS:   Chest pain post  pci.    ------------------------------------------------------------------------------  STUDY CONCLUSIONS  SUMMARY:    1. Left ventricle: The cavity size is normal. Wall thickness is mildly  increased. Systolic function is normal. The ejection fraction was measured  by single plane method of disks. The ejection fraction is 69%.  2. Right ventricle: The cavity size is normal. Systolic function is normal.    ------------------------------------------------------------------------------  STUDY DATA:   Procedure:  A transthoracic echocardiogram was performed. Image  quality was good.  M-mode, complete 2D, complete spectral Doppler, and color  Doppler.  Study status:  Routine.  Study completion:  There were no  complications.    FINDINGS    LEFT VENTRICLE:  The cavity size is normal. Wall thickness is mildly  increased. Systolic function is normal. Wall motion is normal; there are no  regional wall motion abnormalities. Unable to accurately assess left  ventricular diastolic function parameters due to tachycardia.    The ejection  fraction was measured by single plane method of disks. The ejection fraction  is 69%. Cannot assess LV diastolic function.    AORTIC VALVE:  The annulus is normal-sized and mildly calcified. The valve is  trileaflet. The leaflets are mildly thickened. Velocity is within the normal  range. There is no stenosis. There is no regurgitation.    AORTA:  Aortic root: The root is normal-sized.  Ascending aorta: The vessel is normal-sized.    MITRAL VALVE:  The annulus is normal-sized. The annulus is mildly calcified.  The leaflets are mildly thickened. Inflow velocity is within the normal range.  There is no evidence for stenosis. There is trivial regurgitation.    ATRIAL SEPTUM:   Color doppler shows no obvious shunt.    LEFT ATRIUM:  The atrium is normal in size.    RIGHT VENTRICLE:  The cavity size is normal. Systolic function is normal.  Systolic pressure is within the normal range. The estimated  peak pressure is  29mm Hg.       The RV pressure during systole is 29mm Hg.    PULMONIC VALVE:   The annulus is normal-sized. The leaflets are normal  thickness. Velocity is within the normal range. There is no evidence for  stenosis. There is no regurgitation.    TRICUSPID VALVE:  The annulus is normal-sized. The leaflets are normal  thickness. Inflow velocity is within the normal range. There is no evidence  for stenosis. There is mild regurgitation.    RIGHT ATRIUM:  The atrium is normal in size.       The estimated central  venous pressure is 3mm Hg.    PERICARDIUM:   There is no pericardial effusion.    SYSTEMIC VEINS:  Inferior vena cava: The IVC is normal-sized.  Respirophasic diameter changes  are in the normal range (>= 50%).    ------------------------------------------------------------------------------  Measurements    Left ventricle            Value        Ref       08/02/2023  Left atrium              Value          Ref       08/02/2023  AHMET, LAX chord        (N) 4.6   cm     3.8 - 5.2 3.8         AP dim, ES           (N) 3.5   cm       2.7 - 3.8 ----------  AHMET/bsa, LAX chord    (N) 2.5   cm/m^2 2.3 - 3.1 2.0         AP dim index, ES     (N) 1.9   cm/m^2   1.5 - 2.3 ----------  PW, ED, LAX           (H) 1.0   cm     0.6 - 0.9 1.0         Area ES, A4C         (N) 16    cm^2     <=20      ----------  AHMET major ax, A4C         7.4   cm     --------- ----------  Area/bsa ES, A4C         8.59  cm^2/m^2 --------- ----------  ESD major ax, A4C         5.5   cm     --------- ----------  Area ES, A2C             16    cm^2     --------- ----------  FS major axis, A4C        25    %      --------- ----------  Area/bsa ES, A2C         8.53  cm^2/m^2 --------- ----------  AHMET/bsa major ax, A4C     3.9   cm/m^2 --------- ----------  Vol, S               (N) 41    ml       22 - 52   ----------  ESD/bsa major ax, A4C     3.0   cm/m^2 --------- ----------  Vol/bsa, S           (N) 23    ml/m^2   16 - 34    ----------  CARLOS, A4C                  20.4  cm^2   --------- ----------  Vol, ES, 1-p A4C     (N) 35    ml       22 - 52   ----------  HONEY, A4C                  9.6   cm^2   --------- ----------  Vol/bsa, ES, 1-p A4C (N) 19    ml/m^2   11 - 40   ----------  FAC, A4C                  53    %      --------- ----------  Vol, ES, 1-p A2C     (N) 44    ml       22 - 52   ----------  IVS, ED               (N) 0.9   cm     0.6 - 0.9 1.0         Vol/bsa, ES, 1-p A2C (N) 23    ml/m^2   13 - 40   ----------  PW, ED                (H) 1.0   cm     0.6 - 0.9 1.0         Vol, ES, 2-p             42    ml       --------- ----------  IVS/PW, ED                0.9          --------- 0.96        Vol/bsa, ES, 2-p     (N) 22    ml/m^2   16 - 34   ----------  EDV                   (N) 100   ml     46 - 106  64  ESV                   (N) 24    ml     14 - 42   23          Tricuspid valve          Value          Ref       08/02/2023  EF                    (N) 69    %      54 - 74   60          Peak RV-RA grad, S       26    mm Hg    --------- ----------  SV                        68    ml     --------- 41  EDV/bsa               (N) 53    ml/m^2 29 - 61   33          Aortic root              Value          Ref       08/02/2023  ESV/bsa               (N) 13    ml/m^2 8 - 24    12          Root diam, ED        (L) 2.4   cm       2.6 - 4.0 ----------  SV/bsa                    36    ml/m^2 --------- 21  SV, 1-p A4C               32    ml     --------- ----------  Ascending aorta          Value          Ref       08/02/2023  SV/bsa, 1-p A4C           17    ml/m^2 --------- ----------  AAo AP diam, ED      (N) 2.7   cm       1.9 - 3.5 ----------  ESV, 2-p              (N) 14    ml     14 - 42   ----------  AAo AP diam/bsa, ED  (N) 1.4   cm/m^2   1.0 - 2.2 ----------  ESV/bsa, 2-p          (N) 8     ml/m^2 8 - 24    ----------  Pulmonary artery         Value          Ref       08/02/2023  Right ventricle           Value        Ref        08/02/2023  Pressure, S              29    mm Hg    --------- ----------  AHMET, LAX                  2.9   cm     --------- ----------  TAPSE, MM             (N) 1.7   cm     >=1.7     ----------  Systemic veins           Value          Ref       08/02/2023  Pressure, S               29    mm Hg  --------- ----------  Estimated CVP            3     mm Hg    --------- ----------    Legend:  (L)  and  (H)  aguilar values outside specified reference range.    (N)  marks values inside specified reference range.    Prepared and electronically signed by  Warner Faria MD  09/18/2024 09:37       Cath Report:  Results for orders placed or performed during the hospital encounter of 09/17/24   Cath/PV Case    Narrative      2nd Mrg lesion with 80% stenosis.    Prox LAD to Mid LAD lesion with 80% stenosis.    Prox RCA to Mid RCA lesion with 90% stenosis.    Successful PCI of LAD and RCA lesions using FFR and IVUS guidance    Cardiac Catheterization Laboratory Report    The patient is a 66 year old female who presents with typical angina for   [facility]     I discussed the risks, benefits, and alternatives of angiography and   revascularization.   The risks include, but are not limited to:  stroke,   heart attack, death, arrhythmia, renal failure, bleeding, transfusion,   site infection, arterial pseudoaneurysm formation, arterio-venous fistula   formation, limb and organ ischemia, peripheral embolization and the like.    I also discussed the possible outcomes including:  normal findings,   disease treated with medical therapy alone, disease amenable to   percutaneous  intervention, and disease needing surgical    revascularization.  The use of bare metal stents and drug eluting   stents/balloons was discussed, including the rationale and length of   treatment with antiplatelet medications.  The patient was informed that   additional, unanticipated procedures may be necessary during the   procedure, based on my clinical judgement.  I  discussed the possibility of   using a closure device to close the arteriotomy if a femoral artery   approach is used.  I outlined the risks associated with closure device   use, including, but not limited to:  bleeding, site infection, arterial   pseudoaneurysm formation, arterio-venous fistula formation, arterial   injury, and clot formation.  I provided the patient with an opportunity to   ask questions.  The patient gave informed, written consent.  Based on   procedural findings, I will make further recommendations.      Narrative Procedure:  Witnessed informed consent was obtained, and the patient was transported   to the cardiac catheterization lab where a \"Time Out\" was performed.     After the usual sterile prep and drape, the right radial site was locally   anesthetized and a 6F Slender sheath was inserted into the right radial   artery using the Seldinger technique.  Intra-arterial verapamil and nitro   was administered to prevent radial artery spasm and IV heparin   administered to prevent radial artery occlusion.  Using 6F tiger   diagnostic catheters, selective coronary angiography was performed in   multiple projections.  All catheter and sheath exchanges were performed   using a guidewire.  LVEDP was obtained with use of a tiger catheter    Decision was made to proceed with PCI of the RCA lesion first and then the   LAD lesion.  IV heparin was given to maintain therapeutic anticoagulation   throughout the procedure.  A AL1 guide was used to engage the RCA. IVUS   was used for vessel sizing and characteristics, showed eccentric soft   plaque.  The lesion was pre-dilated with a 3 mm balloon, a 3.5 stent   proximally and 3.0 stent distally was deployed at nominal pressure, then   post-dilated with a 3.5 balloon. Final IVUS showed excellent stent   expansion and apposition, no edge dissection. RV marginal branch was   underfilled, causing some chest pain for which IA nitro and IV nitro gtt   was  initiated. Then we did FFR using cathworks for the LAD lesion which   had a focal step down of 0.77 across the lesion. A 6F XB-3.5 guide was   used to engage the LM, a minoma wire was used to cross the lesion, IVUS   was used for vessel sizing and characteristics, showed eccentric soft   plaque.  The lesion was pre-dilated with a 3 mm balloon, a 3.5 x 18 and 34   mm RENÉE was deployed at nominal pressure, then post-dilated with a 3.5   balloon. Final IVUS showed excellent stent expansion and apposition, no   edge dissection.  Final angiogram showed excellent stent expansion, no   dissection and no perforation.  The wire was withdrawn, the guide removed   over a guidewire.  We went into with a JR to check given the ST changes   and chest pain, her RCA was patent with BERHANE-3 flow and RV marginal branch   with good flow as well. We also noted OM2 small focal 80% lesion which we   did not intervene on at this time.     The radial puncture was uneventfully closed with application of band   providing patent hemostasis.    Procedure performed:  Ultrasound-guided right transradial artery access and closure with   transradial band placement for patent hemostasis  Diagnostic coronary angiography  Left heart catheterization  FFR using [employer]  Successful IVUS-guided PCI of the prox-mid RCA lesion with deployment of a   3.0 and 3.5 RENÉE, post-dilated to 3.5. Prox-Mid LAD lesions with deployment   of a 3.5 RENÉE stents post dilated to 3.5 mm with excellent angiographic and   intravascular imaging results.  BERHANE-3 pre-PCI, BERHANE-3 post-PCI  Moderate conscious sedation for 89 minutes, by administration of fentanyl   and midazolam for sedation and patient comfort, with continuous EKG, pulse   oximetry and non-invasive and invasive blood pressure monitoring.  Please   refer to nursing documentation for sedation dose administered.     Findings:  Right dominant system  Proximal RCA with 65% stenosis, Mid RCA with a 95% stenosis. IVUS  guided   PCI of proximal RCA with a 3.5 x 22 RENÉE and post dilation to 3.5 mm. IVUS   guided PCI of mid RCA lesion with a 3.0 stent with TIMI3 flow.   Left main with eccentric soft plaque without significant stenosis.   Proximal LAD with 50% lesion with soft plaque, mid LAD has a 80% stenosis   with FFR 0.77 using cathworks. Distal LAD with diffuse luminal   irregularities. IVUS guided PCI of prox and mid LAD lesion with 3.5 mm RENÉE   x2.   LCX without significant stenosis, OM2 has a focal 85% stenosis.    Recommendations:  Post PCI care per usual protocol.  TR band to stay on for 2 hours.    Bedrest for 2 hours, followed by observation overnight discharge tomorrow   if chest pain resolves. Continue on Nitro gtt for pain.   Plavix daily and Aspirin daily for 6 months. Zetia and high-intensity   statin daily indefinitely.    EKG today and in the AM  Maximize medical management per ACC/AHA guidelines  Follow up in Cardiology clinic  If still with angina can consider PCI of the OM2  Cardiac rehab referral.     Procedure performed by Dr Gupta    The results were reviewed with the referring physicians and the   patient/family. The patient tolerated the procedure well without immediate   complications.           Twelve-lead EKG:      Assessment/Plans:     Troponin elevation  -The patient with known history of obstructive coronary artery disease.  Underwent recent percutaneous coronary intervention, IVUS guided, of the left anterior descending and right coronary arteries with a residual high-grade lesion of the second obtuse marginal with previous plans for staged intervention but deferred due to high infection.  Was rescheduled for April 17, 2025 to be performed by Dr. Diogo Gupta.  -She is asymptomatic.  No anginal symptoms  -Despite enzyme elevation, no rise and fall to suggest acute coronary syndrome  -Suspect demand ischemia in the second of increased metabolic demand due to hyperglycemia in a patient with a known  obstructive high-grade lesion of the second obtuse marginal  -Prior echocardiogram from September 2024 with preserved right and left ventricular function.  No regional wall motion abnormalities  -After discussion with Dr. Gupta, will plan for staged intervention of the second obtuse marginal tomorrow.  This was discussed with patient.  N.p.o. at midnight.  2D echo was ordered.  Continue IV heparin.  Continue DAPT with aspirin and Plavix.  Continue metoprolol.  Intolerance to statin so we will add Zetia but would benefit from Leqvio versus Repatha.  Can be discussed with her primary cardiologist.    Hyperglycemia  Diabetes   -Secondary to dietary noncompliance  -Continue management per primary care services    Essential hypertension  -Blood pressure is controlled  -Hold indapamide in anticipation for above will continue with a background medical therapy.  Can resume indapamide at discharge.    Hyperlipidemia  -Document intolerance to statin  -Added Zetia but would benefit from Leqvio versus Repatha.  Can be discussed with outpatient cardiologist.    Acute kidney injury  -Baseline renal function of 0.8.  Admission creatinine of 1.6 but now down trended to 1.2  -In the setting of hyperglycemia.  Improved with IV resuscitation  -Avoid nephrotoxins.  Holding indapamide for now.    INFORMED CONSENT:  Risks and benefits of cardiac catheterization was discussed in detail.  Risks of the procedure include but are not limited to the following:  Death, myocardial infarction, stroke, vascular injury requiring emergency surgery, and renal injury requiring temporary or permanent hemodialysis.  The patient demonstrates an understanding and agrees to proceed.     Thank you for allowing us to participate in this patient's care.  Please do not hesitate to call with any questions or concerns.

## 2025-04-17 ENCOUNTER — EMERGENCY (EMERGENCY)
Facility: HOSPITAL | Age: 60
LOS: 0 days | Discharge: HOME | End: 2025-04-18
Attending: EMERGENCY MEDICINE
Payer: MEDICAID

## 2025-04-17 VITALS
RESPIRATION RATE: 15 BRPM | OXYGEN SATURATION: 96 % | HEART RATE: 100 BPM | HEIGHT: 67 IN | DIASTOLIC BLOOD PRESSURE: 106 MMHG | WEIGHT: 182.1 LBS | TEMPERATURE: 98 F | SYSTOLIC BLOOD PRESSURE: 155 MMHG

## 2025-04-17 DIAGNOSIS — R45.1 RESTLESSNESS AND AGITATION: ICD-10-CM
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
## 2025-04-17 DIAGNOSIS — K74.60 UNSPECIFIED CIRRHOSIS OF LIVER: ICD-10-CM

## 2025-04-17 DIAGNOSIS — F10.129 ALCOHOL ABUSE WITH INTOXICATION, UNSPECIFIED: ICD-10-CM

## 2025-04-17 LAB
ALBUMIN SERPL ELPH-MCNC: 3.4 G/DL — SIGNIFICANT CHANGE UP (ref 3.3–5)
ALP SERPL-CCNC: 87 U/L — SIGNIFICANT CHANGE UP (ref 40–120)
ALT FLD-CCNC: 40 U/L — SIGNIFICANT CHANGE UP (ref 12–78)
ANION GAP SERPL CALC-SCNC: 12 MMOL/L — SIGNIFICANT CHANGE UP (ref 5–17)
ANISOCYTOSIS BLD QL: SLIGHT — SIGNIFICANT CHANGE UP
APAP SERPL-MCNC: < 2 UG/ML (ref 10–30)
APTT BLD: 44 SEC — HIGH (ref 24.5–35.6)
AST SERPL-CCNC: 87 U/L — HIGH (ref 15–37)
BASOPHILS # BLD AUTO: 0.04 K/UL — SIGNIFICANT CHANGE UP (ref 0–0.2)
BASOPHILS NFR BLD AUTO: 0.9 % — SIGNIFICANT CHANGE UP (ref 0–2)
BILIRUB SERPL-MCNC: 0.9 MG/DL — SIGNIFICANT CHANGE UP (ref 0.2–1.2)
BUN SERPL-MCNC: 16 MG/DL — SIGNIFICANT CHANGE UP (ref 7–23)
CALCIUM SERPL-MCNC: 8.9 MG/DL — SIGNIFICANT CHANGE UP (ref 8.5–10.1)
CHLORIDE SERPL-SCNC: 108 MMOL/L — SIGNIFICANT CHANGE UP (ref 96–108)
CO2 SERPL-SCNC: 21 MMOL/L — LOW (ref 22–31)
CREAT SERPL-MCNC: 1.19 MG/DL — SIGNIFICANT CHANGE UP (ref 0.5–1.3)
EGFR: 71 ML/MIN/1.73M2 — SIGNIFICANT CHANGE UP
EGFR: 71 ML/MIN/1.73M2 — SIGNIFICANT CHANGE UP
EOSINOPHIL # BLD AUTO: 0.02 K/UL — SIGNIFICANT CHANGE UP (ref 0–0.5)
EOSINOPHIL NFR BLD AUTO: 0.4 % — SIGNIFICANT CHANGE UP (ref 0–6)
ETHANOL SERPL-MCNC: 328 MG/DL — HIGH (ref 0–10)
GLUCOSE SERPL-MCNC: 73 MG/DL — SIGNIFICANT CHANGE UP (ref 70–99)
HCT VFR BLD CALC: 36.5 % — LOW (ref 39–50)
HGB BLD-MCNC: 11.3 G/DL — LOW (ref 13–17)
HYPOCHROMIA BLD QL: SLIGHT — SIGNIFICANT CHANGE UP
IMM GRANULOCYTES NFR BLD AUTO: 0.4 % — SIGNIFICANT CHANGE UP (ref 0–0.9)
INR BLD: 1.13 RATIO — SIGNIFICANT CHANGE UP (ref 0.85–1.16)
LIDOCAIN IGE QN: 58 U/L — SIGNIFICANT CHANGE UP (ref 13–75)
LYMPHOCYTES # BLD AUTO: 2.9 K/UL — SIGNIFICANT CHANGE UP (ref 1–3.3)
LYMPHOCYTES # BLD AUTO: 64 % — HIGH (ref 13–44)
MAGNESIUM SERPL-MCNC: 1.9 MG/DL — SIGNIFICANT CHANGE UP (ref 1.6–2.6)
MANUAL SMEAR VERIFICATION: SIGNIFICANT CHANGE UP
MCHC RBC-ENTMCNC: 21.3 PG — LOW (ref 27–34)
MCHC RBC-ENTMCNC: 31 G/DL — LOW (ref 32–36)
MCV RBC AUTO: 68.9 FL — LOW (ref 80–100)
MICROCYTES BLD QL: SLIGHT — SIGNIFICANT CHANGE UP
MONOCYTES # BLD AUTO: 0.31 K/UL — SIGNIFICANT CHANGE UP (ref 0–0.9)
MONOCYTES NFR BLD AUTO: 6.8 % — SIGNIFICANT CHANGE UP (ref 2–14)
NEUTROPHILS # BLD AUTO: 1.24 K/UL — LOW (ref 1.8–7.4)
NEUTROPHILS NFR BLD AUTO: 27.5 % — LOW (ref 43–77)
NRBC BLD AUTO-RTO: 0 /100 WBCS — SIGNIFICANT CHANGE UP (ref 0–0)
OVALOCYTES BLD QL SMEAR: SLIGHT — SIGNIFICANT CHANGE UP
PHOSPHATE SERPL-MCNC: 3.2 MG/DL — SIGNIFICANT CHANGE UP (ref 2.5–4.5)
PLAT MORPH BLD: NORMAL — SIGNIFICANT CHANGE UP
PLATELET # BLD AUTO: 220 K/UL — SIGNIFICANT CHANGE UP (ref 150–400)
POIKILOCYTOSIS BLD QL AUTO: SLIGHT — SIGNIFICANT CHANGE UP
POTASSIUM SERPL-MCNC: 4.7 MMOL/L — SIGNIFICANT CHANGE UP (ref 3.5–5.3)
POTASSIUM SERPL-SCNC: 4.7 MMOL/L — SIGNIFICANT CHANGE UP (ref 3.5–5.3)
PROT SERPL-MCNC: 8.6 GM/DL — HIGH (ref 6–8.3)
PROTHROM AB SERPL-ACNC: 13.1 SEC — SIGNIFICANT CHANGE UP (ref 9.9–13.4)
RBC # BLD: 5.3 M/UL — SIGNIFICANT CHANGE UP (ref 4.2–5.8)
RBC # FLD: 19.3 % — HIGH (ref 10.3–14.5)
RBC BLD AUTO: ABNORMAL
SALICYLATES SERPL-MCNC: <1.7 MG/DL — LOW (ref 2.8–20)
SODIUM SERPL-SCNC: 141 MMOL/L — SIGNIFICANT CHANGE UP (ref 135–145)
TARGETS BLD QL SMEAR: SLIGHT — SIGNIFICANT CHANGE UP
TROPONIN I, HIGH SENSITIVITY RESULT: 22.6 NG/L — SIGNIFICANT CHANGE UP
WBC # BLD: 4.53 K/UL — SIGNIFICANT CHANGE UP (ref 3.8–10.5)
WBC # FLD AUTO: 4.53 K/UL — SIGNIFICANT CHANGE UP (ref 3.8–10.5)

## 2025-04-17 PROCEDURE — 99285 EMERGENCY DEPT VISIT HI MDM: CPT

## 2025-04-17 PROCEDURE — 93010 ELECTROCARDIOGRAM REPORT: CPT

## 2025-04-17 PROCEDURE — 71045 X-RAY EXAM CHEST 1 VIEW: CPT | Mod: 26

## 2025-04-17 RX ORDER — HALOPERIDOL 10 MG/1
5 TABLET ORAL ONCE
Refills: 0 | Status: COMPLETED | OUTPATIENT
Start: 2025-04-17 | End: 2025-04-17

## 2025-04-17 RX ORDER — ONDANSETRON HCL/PF 4 MG/2 ML
4 VIAL (ML) INJECTION ONCE
Refills: 0 | Status: COMPLETED | OUTPATIENT
Start: 2025-04-17 | End: 2025-04-17

## 2025-04-17 RX ORDER — MIDAZOLAM IN 0.9 % SOD.CHLORID 1 MG/ML
2 PLASTIC BAG, INJECTION (ML) INTRAVENOUS ONCE
Refills: 0 | Status: DISCONTINUED | OUTPATIENT
Start: 2025-04-17 | End: 2025-04-17

## 2025-04-17 RX ORDER — SUCRALFATE 1 G
1 TABLET ORAL ONCE
Refills: 0 | Status: COMPLETED | OUTPATIENT
Start: 2025-04-17 | End: 2025-04-17

## 2025-04-17 RX ADMIN — Medication 2 MILLIGRAM(S): at 21:28

## 2025-04-17 RX ADMIN — Medication 100 MILLIGRAM(S): at 20:02

## 2025-04-17 RX ADMIN — HALOPERIDOL 5 MILLIGRAM(S): 10 TABLET ORAL at 21:29

## 2025-04-17 RX ADMIN — Medication 20 MILLIGRAM(S): at 20:02

## 2025-04-17 RX ADMIN — Medication 4 MILLIGRAM(S): at 20:01

## 2025-04-17 RX ADMIN — Medication 1000 MILLILITER(S): at 20:01

## 2025-04-17 NOTE — ED ADULT NURSE NOTE - NSFALLHARMRISKINTERV_ED_ALL_ED
Assistance OOB with selected safe patient handling equipment if applicable/Assistance with ambulation/Communicate risk of Fall with Harm to all staff, patient, and family/Monitor gait and stability/Monitor for mental status changes and reorient to person, place, and time, as needed/Provide visual cue: red socks, yellow wristband, yellow gown, etc/Reinforce activity limits and safety measures with patient and family/Toileting schedule using arm’s reach rule for commode and bathroom/Use of alarms - bed, stretcher, chair and/or video monitoring/Bed in lowest position, wheels locked, appropriate side rails in place/Call bell, personal items and telephone in reach/Instruct patient to call for assistance before getting out of bed/chair/stretcher/Non-slip footwear applied when patient is off stretcher/[place] to call system/Physically safe environment - no spills, clutter or unnecessary equipment/Purposeful Proactive Rounding/Room/bathroom lighting operational, light cord in reach

## 2025-04-17 NOTE — ED PROCEDURE NOTE - CPROC ED INFORMED CONSENT1
Benefits, risks, and possible complications of procedure explained to patient/caregiver who verbalized understanding and gave verbal consent. no back pain

## 2025-04-17 NOTE — ED ADULT NURSE NOTE - IS THE PATIENT ABLE TO BE SCREENED?
Called Ashok and discussed lab results. Discussed that his A1c was 6.6 which does meet the criteria for diabetes. Ask if he would like to have this followed by his primary care or have referral placed to endocrinology and patient stated to place a referral to endocrinology. Also discussed that labs showed anemia, likely related to kidneys, which is chronic. Patient is aware and states he will follow with primary care regarding this. No

## 2025-04-17 NOTE — ED PROVIDER NOTE - NSFOLLOWUPINSTRUCTIONS_ED_ALL_ED_FT
Alcohol Use Disorder    WHAT YOU NEED TO KNOW:    Alcohol use disorder (AUD) is problem drinking. AUD includes alcohol abuse and alcohol dependency.     DISCHARGE INSTRUCTIONS:    Seek care immediately if:     Your heart is beating faster than usual.      You have hallucinations.      You cannot remember what happens while you are drinking.      You have seizures.    Contact your healthcare provider if:     You are anxious and have nausea.      Your hands are shaky and you are sweating heavily.      You have questions or concerns about your condition or care.    Follow up with your healthcare provider as directed: Do not try to stop drinking on your own. Your healthcare provider may need to help you withdraw from alcohol safely. He may need to admit you to the hospital. You may also need any of the following treatments:    Medicines to decrease your craving for alcohol      Support groups such as Alcoholics Anonymous       Therapy from a psychiatrist or psychologist       Admission to an inpatient facility for treatment for severe AUD    Interested in discussing options to reduce your alcohol or drug use?      [facility]: [phone]   [facility] Substance Abuse Services: [phone], option #2   Methadone Maintenance & Ambulatory Opiate Detox: [phone]  [facility]: [phone]  [facility]: [phone]  [facility]: [phone]    [facility] [place]: [phone], option #2   [facility]: [phone]    [facility]: [phone]    [facility] Central Intake: [phone]  [facility] Chemical Dependency/Ancillary Withdrawal: [phone]  [facility] Methadone Maintenance: [phone]    [facility]: [phone]  [facility] Addiction Treatment Services: [phone]    [facility]: 7-833-6-HOPENY    [facility] Office of Alcoholism and Substance Abuse Services (OASAS): https://www.oasas.[place].gov/providerdirectory/  [facility] for Addiction Services and Psychotherapy Interventions Research (CASPIR)  www.[facility].org     Interested in discussing options to reduce your tobacco use?    [facility] for Tobacco Control:  [phone]  [facility] QUITLINE: 1-115-AL-QUITS ([phone])    Interested in learning more about substance use?      http://rethinkingdrinking.niaaa.nih.gov   https://www.drugabuse.gov/patients-families     Learn more about opioid overdose prevention programs in [facility]:  http://www.[facility].ny.[facility]/diseases/aids/general/opioid_overdose_prevention/      Acute Abdominal Pain    WHAT YOU NEED TO KNOW:    The cause of your abdominal pain may not be found. If a cause is found, treatment will depend on what the cause is.     DISCHARGE INSTRUCTIONS:    Return to the emergency department if:     You vomit blood or cannot stop vomiting.      You have blood in your bowel movement or it looks like tar.       You have bleeding from your rectum.       Your abdomen is larger than usual, more painful, and hard.       You have severe pain in your abdomen.       You stop passing gas and having bowel movements.       You feel weak, dizzy, or faint.    Contact your healthcare provider if:     You have a fever.      You have new signs and symptoms.      Your symptoms do not get better with treatment.       You have questions or concerns about your condition or care.    Medicines may be given to decrease pain, treat an infection, and manage your symptoms. Take your medicine as directed. Call your healthcare provider if you think your medicine is not helping or if you have side effects. Tell him if you are allergic to any medicine. Keep a list of the medicines, vitamins, and herbs you take. Include the amounts, and when and why you take them. Bring the list or the pill bottles to follow-up visits. Carry your medicine list with you in case of an emergency.    Manage your symptoms:     Apply heat on your abdomen for 20 to 30 minutes every 2 hours for as many days as directed. Heat helps decrease pain and muscle spasms.       Manage your stress. Stress may cause abdominal pain. Your healthcare provider may recommend relaxation techniques and deep breathing exercises to help decrease your stress. Your healthcare provider may recommend you talk to someone about your stress or anxiety, such as a counselor or a trusted friend. Get plenty of sleep and exercise regularly.       Limit or do not drink alcohol. Alcohol can make your abdominal pain worse. Ask your healthcare provider if it is safe for you to drink alcohol. Also ask how much is safe for you to drink.       Do not smoke. Nicotine and other chemicals in cigarettes can damage your esophagus and stomach. Ask your healthcare provider for information if you currently smoke and need help to quit. E-cigarettes or smokeless tobacco still contain nicotine. Talk to your healthcare provider before you use these products.     Make changes to the food you eat as directed: Do not eat foods that cause abdominal pain or other symptoms. Eat small meals more often.     Eat more high-fiber foods if you are constipated. High-fiber foods include fruits, vegetables, whole-grain foods, and legumes.       Do not eat foods that cause gas if you have bloating. Examples include broccoli, cabbage, and cauliflower. Do not drink soda or carbonated drinks, because these may also cause gas.       Do not eat foods or drinks that contain sorbitol or fructose if you have diarrhea and bloating. Some examples are fruit juices, candy, jelly, and sugar-free gum.       Do not eat high-fat foods, such as fried foods, cheeseburgers, hot dogs, and desserts.      Limit or do not drink caffeine. Caffeine may make symptoms, such as heart burn or nausea, worse.       Drink plenty of liquids to prevent dehydration from diarrhea or vomiting. Ask your healthcare provider how much liquid to drink each day and which liquids are best for you.     Follow up with your healthcare provider as directed: Write down your questions so you remember to ask them during your visits.

## 2025-04-17 NOTE — ED PROVIDER NOTE - OBJECTIVE STATEMENT
Attending note (Cole): 60-year-old male history of alcohol use disorder cirrhosis chronic abdominal pain recent admission to [facility] inpatient med for abdominal pain and vomiting (10/20/2024 - 10/23/2024), who presents complaining of "liver pain "brought in by EMS.  Patient refusing to answer other questions denies alcohol use states he has not used ever.  Per EMR noted to have long history of alcohol use and frequent ED visits and hospitalizations.

## 2025-04-17 NOTE — PROGRESS NOTE ADULT - ASSESSMENT
54 yo M with a history of chronic ETOH abuse w/ DTs with frequent hospital admissions, HLD, alcoholic gastritis/esophagitis/GERD/PUD & hx of hypoxic respiratory failure requiring intubation due to aspiration PNA, presented w/ ROSA. His hospital course was complicated by alcohol withdrawal with DTs and MSSA PNA.   Pt initially admitted to ICU from floors after RRT for severe agitation 2/2 ETOH withdrawal and DTs for precedex gtt.  Received ATC phenobarb with precedex gtt.   Course c/b fevers, sputum culture growing MSSA s/p course of unasyn.   Pt transitioned from phenobarb IVP to seroquen and librium, tolerating.  Awake, alert, cooperative at this time.  Weaned from precedex. Detail Level: Detailed 54 yo M with a history of chronic ETOH abuse w/ DTs with frequent hospital admissions, HLD, alcoholic gastritis/esophagitis/GERD/PUD & hx of hypoxic respiratory failure requiring intubation due to aspiration PNA, presented w/ nausea and vomiting and was admitted for ROSA & lactic acidosis due to dehydration. His hospital course was complicated by MSSA PNA & alcohol withdrawal with DTs requiring admission to the ICU on a Precedex gtt and phenobarb on 3/5. Pt improved and was transferred to medical floor on 3/15.     Alcohol withdrawal  - patient is off Precedex and phenobarb  - c/w librium taper  - c/w Seroquel  - c/w thiamin, folate and MVN    MSSA PNA  - s/p course of Unasyn  - CXR was negative but sputum grew MSSA    HTN  - c/w Metoprolol    HLD  - c/w Lipitor    Prophylaxis:  DVT: Lovenox  GI: PO diet 56 yo M with a history of chronic ETOH abuse w/ DTs with frequent hospital admissions, HLD, alcoholic gastritis/esophagitis/GERD/PUD & hx of hypoxic respiratory failure requiring intubation due to aspiration PNA, presented w/ nausea and vomiting and was admitted for ROSA & lactic acidosis due to dehydration. His hospital course was complicated by MSSA PNA & alcohol withdrawal with DTs requiring admission to the ICU on a Precedex gtt and phenobarb on 3/5. Pt improved and was transferred to medical floor on 3/15.     Alcohol withdrawal  - patient is off Precedex and phenobarb  - c/w librium taper  - c/w Seroquel  - c/w thiamin, folate and MVN    MSSA PNA  - s/p course of Unasyn  - CXR was negative but sputum grew MSSA    HTN  - c/w Metoprolol    HLD  - c/w Lipitor    Prophylaxis:  DVT: Lovenox  GI: PO diet    i called his son, Lavelle, but he did not

## 2025-04-17 NOTE — ED PROVIDER NOTE - PROGRESS NOTE DETAILS
Attending note (Cole): Alcohol greater than 300, patient appears comfortable initially now getting up stating he wants to leave but appears grossly intoxicated.  Ripped out IV.  At this time given no emergent/actionable lab abnormalities we will proceed with IM Haldol and Versed for behavioral control/agitation one-to-one for observation to ensure no more additional self-harm.  Internal triage nurse alerted and assisting with one-to-one at this time.  Continue to monitor for clinical sobriety. Attending note (Cole): resting comfortably in strecher; maintain 1:1 obs for safety; patient care endorsed to oncoming physician at time of sign out. Black DO: pt received at sign out, alcohol intox w/ initial complaint of abd pain, required sedation due to agitation. Pt now sleeping comfortably, no actionable lab findings, will await clinical sobriety and anticipated dc once sober Black DO: pt tolerated po, no tremors or fasciculations, pt alert and ambulatory independently, appears clinically sober, pt requesting dc home, abd soft ntnd, pt complaining of muscle aches, tylenol ordered. stable for dc Attending note (Cole): Alcohol greater than 300, patient appears comfortable initially now getting up stating he wants to leave but appears still grossly intoxicated.  Ripped out IV.  At this time given no emergent/actionable lab abnormalities we will proceed with IM Haldol and Versed for behavioral control/agitation one-to-one for observation to ensure no more additional self-harm.  Internal triage nurse alerted and assisting with one-to-one at this time.  Continue to monitor for clinical sobriety. Attending note (Cole): resting comfortably in stretcher; maintain 1:1 obs for safety; patient care endorsed to oncoming physician at time of sign out.

## 2025-04-17 NOTE — ED PROVIDER NOTE - CARE PLAN
1 Principal Discharge DX:	Agitation  Secondary Diagnosis:	Alcohol intoxication  Secondary Diagnosis:	Cirrhosis

## 2025-04-17 NOTE — ED ADULT TRIAGE NOTE - HEART RATE (BEATS/MIN)
----- Message from Nurse De Los Santos sent at 4/17/2025  4:19 PM CDT -----    ----- Message -----  From: Junito Ring MD  Sent: 4/17/2025  11:34 AM CDT  To: Magali Ervin LPN    Please inform patient of results.     1. Start Synthroid 25 mcg q.day.  Rx sent to pharmacy.  Repeat TSH in 6 weeks  ----- Message -----  From: Lab, Background User  Sent: 4/17/2025   9:46 AM CDT  To: Junito Ring MD    
127

## 2025-04-17 NOTE — ED PROVIDER NOTE - CLINICAL SUMMARY MEDICAL DECISION MAKING FREE TEXT BOX
Attending note (Cole): 60-year-old male history of alcohol use disorder cirrhosis chronic abdominal pain recent admission to [facility] inpatient med for abdominal pain and vomiting (10/20/2024 - 10/23/2024), who presents complaining of "liver pain "brought in by EMS.  Patient refusing to answer other questions denies alcohol use states he has not used ever.  Per EMR noted to have long history of alcohol use and frequent ED visits and hospitalizations.  On exam patient tearful but awake alert.  Alcohol on breath.  Normal heart and lung sounds.  Abdomen soft nontender.  No peripheral edema appreciated.  Question trace scleral icterus but not overtly jaundiced.  No asterixis no rigidity clonus or tremors.  Likely acute alcohol intoxication possible element of gastritis with known long history of gastritis.  Evaluate for possible pancreatitis.  Obtain screening chest x-ray and EKG.  Obtain screening labs CBC CMP lipase magnesium phosphorus alcohol/tox panel.  IV fluids thiamine Pepcid Zofran.  Reassess after labs meds and assess for clinical sobriety to determine disposition likely discharge home if no acute actionable findings.

## 2025-04-17 NOTE — ED PROVIDER NOTE - PATIENT PORTAL LINK FT
You can access the FollowMyHealth Patient Portal offered by [facility] by registering at the following website: [email or web address]. By joining [employer]’s FollowMyHealth portal, you will also be able to view your health information using other applications (apps) compatible with our system.

## 2025-04-17 NOTE — ED ADULT TRIAGE NOTE - CHIEF COMPLAINT QUOTE
BIBA from home for abdominal pain, as per emt, patient smells of alcohol, as per emt, patient was able to ambulate with them  hx of etoh abuse

## 2025-04-17 NOTE — ED PROCEDURE NOTE - PROCEDURE ADDITIONAL DETAILS
Peripheral IV access in the Emergency Department obtained under dynamic ultrasound guidance with dark nonpulsatile blood return.  Catheter was flushed afterwards without any resistance or resulting extravasation.  IV catheter confirmed in compressible vein after insertion. [20] gauge catheter placed in a peripheral vein in the [left] upper extremity.

## 2025-04-17 NOTE — ED ADULT NURSE NOTE - OBJECTIVE STATEMENT
pt is confuse, BIBA from home for abdominal pain, smelling like alchol denies drink today.  drinking alcohol today, states he has liver disease. noted with unsteady gait and swollen hand.   hx of etoh abuse

## 2025-04-17 NOTE — ED PROVIDER NOTE - PHYSICAL EXAMINATION
On Physical Exam:  General: on initial exam, tearful, at times agitated/yelling at examiner, but then cooperative with exam  HEENT: PERRL, MMM, question mild scleral icterus  Neck: no neck tenderness, no nuchal rigidity, no JVD  Cardiac: normal s1, s2; RRR; no MGR  Lungs: CTABL  Abdomen: soft nontender/nondistended  : no bladder tenderness or distension  Skin: intact, no rash  Extremities: no peripheral edema, no gross deformities  Neuro: GCS 15, moving all extremities equally, no rigidity/clonus/tremors/asterixis

## 2025-04-18 VITALS
HEART RATE: 98 BPM | RESPIRATION RATE: 17 BRPM | SYSTOLIC BLOOD PRESSURE: 145 MMHG | OXYGEN SATURATION: 98 % | DIASTOLIC BLOOD PRESSURE: 88 MMHG | TEMPERATURE: 98 F

## 2025-04-18 RX ORDER — CHLORDIAZEPOXIDE HCL 10 MG
25 CAPSULE ORAL ONCE
Refills: 0 | Status: DISCONTINUED | OUTPATIENT
Start: 2025-04-18 | End: 2025-04-18

## 2025-04-18 RX ORDER — ACETAMINOPHEN 500 MG/5ML
650 LIQUID (ML) ORAL ONCE
Refills: 0 | Status: COMPLETED | OUTPATIENT
Start: 2025-04-18 | End: 2025-04-18

## 2025-04-18 RX ORDER — ACETAMINOPHEN 500 MG/5ML
975 LIQUID (ML) ORAL ONCE
Refills: 0 | Status: DISCONTINUED | OUTPATIENT
Start: 2025-04-18 | End: 2025-04-18

## 2025-04-18 RX ADMIN — Medication 25 MILLIGRAM(S): at 04:01

## 2025-04-18 RX ADMIN — Medication 650 MILLIGRAM(S): at 02:22

## 2025-04-18 NOTE — ED ADULT NURSE REASSESSMENT NOTE - NS ED NURSE REASSESS COMMENT FT1
Pt AAox3, VSS at this time. Pt ambulating with steady gait at this time. Pt endorses address is address in Patient demographics. Pt requesting medicaid cab home. Ed provider made aware.
Pt AAox3, reports pain to the abdomen 6/10 at this time. Pt pending PIV, meds at this time. Multiple attempts by nursing and ED provider. NELLY.
Pt ambulating with steady gait at this time. Pt assisted with obtaining juice from the vending machine. Pt medicaid cab verified, address verified. Pt and medicaid cab observed safely leaving [facility]. ED provider brock. DANIEL

## 2025-04-19 ENCOUNTER — INPATIENT (INPATIENT)
Facility: HOSPITAL | Age: 60
LOS: 2 days | Discharge: HOME | End: 2025-04-22
Attending: HOSPITALIST | Admitting: HOSPITALIST
Payer: MEDICAID

## 2025-04-19 VITALS
WEIGHT: 177.91 LBS | HEART RATE: 104 BPM | DIASTOLIC BLOOD PRESSURE: 107 MMHG | RESPIRATION RATE: 19 BRPM | OXYGEN SATURATION: 99 % | HEIGHT: 67 IN | TEMPERATURE: 98 F | SYSTOLIC BLOOD PRESSURE: 141 MMHG

## 2025-04-19 LAB
ALBUMIN SERPL ELPH-MCNC: 3.5 G/DL — SIGNIFICANT CHANGE UP (ref 3.3–5)
ALP SERPL-CCNC: 98 U/L — SIGNIFICANT CHANGE UP (ref 40–120)
ALT FLD-CCNC: 214 U/L — HIGH (ref 12–78)
ANION GAP SERPL CALC-SCNC: 9 MMOL/L — SIGNIFICANT CHANGE UP (ref 5–17)
APAP SERPL-MCNC: < 2 UG/ML (ref 10–30)
APTT BLD: 33 SEC — SIGNIFICANT CHANGE UP (ref 24.5–35.6)
AST SERPL-CCNC: 504 U/L — HIGH (ref 15–37)
BASOPHILS # BLD AUTO: 0 K/UL — SIGNIFICANT CHANGE UP (ref 0–0.2)
BASOPHILS NFR BLD AUTO: 0 % — SIGNIFICANT CHANGE UP (ref 0–2)
BILIRUB SERPL-MCNC: 1.2 MG/DL — SIGNIFICANT CHANGE UP (ref 0.2–1.2)
BUN SERPL-MCNC: 7 MG/DL — SIGNIFICANT CHANGE UP (ref 7–23)
CALCIUM SERPL-MCNC: 8.8 MG/DL — SIGNIFICANT CHANGE UP (ref 8.5–10.1)
CHLORIDE SERPL-SCNC: 108 MMOL/L — SIGNIFICANT CHANGE UP (ref 96–108)
CO2 SERPL-SCNC: 27 MMOL/L — SIGNIFICANT CHANGE UP (ref 22–31)
CREAT SERPL-MCNC: 1.09 MG/DL — SIGNIFICANT CHANGE UP (ref 0.5–1.3)
EGFR: 79 ML/MIN/1.73M2 — SIGNIFICANT CHANGE UP
EGFR: 79 ML/MIN/1.73M2 — SIGNIFICANT CHANGE UP
EOSINOPHIL # BLD AUTO: 0.03 K/UL — SIGNIFICANT CHANGE UP (ref 0–0.5)
EOSINOPHIL NFR BLD AUTO: 1 % — SIGNIFICANT CHANGE UP (ref 0–6)
ETHANOL SERPL-MCNC: 342 MG/DL — HIGH (ref 0–10)
FLUAV AG NPH QL: SIGNIFICANT CHANGE UP
FLUBV AG NPH QL: SIGNIFICANT CHANGE UP
GAS PNL BLDV: SIGNIFICANT CHANGE UP
GLUCOSE SERPL-MCNC: 84 MG/DL — SIGNIFICANT CHANGE UP (ref 70–99)
HCT VFR BLD CALC: 33.6 % — LOW (ref 39–50)
HGB BLD-MCNC: 10.6 G/DL — LOW (ref 13–17)
INR BLD: 1.17 RATIO — HIGH (ref 0.85–1.16)
LIDOCAIN IGE QN: 73 U/L — SIGNIFICANT CHANGE UP (ref 13–75)
LYMPHOCYTES # BLD AUTO: 1.53 K/UL — SIGNIFICANT CHANGE UP (ref 1–3.3)
LYMPHOCYTES # BLD AUTO: 59 % — HIGH (ref 13–44)
MAGNESIUM SERPL-MCNC: 1.9 MG/DL — SIGNIFICANT CHANGE UP (ref 1.6–2.6)
MCHC RBC-ENTMCNC: 21.6 PG — LOW (ref 27–34)
MCHC RBC-ENTMCNC: 31.5 G/DL — LOW (ref 32–36)
MCV RBC AUTO: 68.4 FL — LOW (ref 80–100)
MONOCYTES # BLD AUTO: 0.1 K/UL — SIGNIFICANT CHANGE UP (ref 0–0.9)
MONOCYTES NFR BLD AUTO: 4 % — SIGNIFICANT CHANGE UP (ref 2–14)
NEUTROPHILS # BLD AUTO: 0.91 K/UL — LOW (ref 1.8–7.4)
NEUTROPHILS NFR BLD AUTO: 35 % — LOW (ref 43–77)
NRBC BLD AUTO-RTO: SIGNIFICANT CHANGE UP /100 WBCS (ref 0–0)
PLATELET # BLD AUTO: 161 K/UL — SIGNIFICANT CHANGE UP (ref 150–400)
POTASSIUM SERPL-MCNC: 4.2 MMOL/L — SIGNIFICANT CHANGE UP (ref 3.5–5.3)
POTASSIUM SERPL-SCNC: 4.2 MMOL/L — SIGNIFICANT CHANGE UP (ref 3.5–5.3)
PROT SERPL-MCNC: 8.5 GM/DL — HIGH (ref 6–8.3)
PROTHROM AB SERPL-ACNC: 13.6 SEC — HIGH (ref 9.9–13.4)
RBC # BLD: 4.91 M/UL — SIGNIFICANT CHANGE UP (ref 4.2–5.8)
RBC # FLD: 19 % — HIGH (ref 10.3–14.5)
RSV RNA NPH QL NAA+NON-PROBE: SIGNIFICANT CHANGE UP
SARS-COV-2 RNA SPEC QL NAA+PROBE: SIGNIFICANT CHANGE UP
SODIUM SERPL-SCNC: 144 MMOL/L — SIGNIFICANT CHANGE UP (ref 135–145)
SOURCE RESPIRATORY: SIGNIFICANT CHANGE UP
WBC # BLD: 2.6 K/UL — LOW (ref 3.8–10.5)
WBC # FLD AUTO: 2.6 K/UL — LOW (ref 3.8–10.5)

## 2025-04-19 PROCEDURE — 71045 X-RAY EXAM CHEST 1 VIEW: CPT | Mod: 26

## 2025-04-19 PROCEDURE — 93010 ELECTROCARDIOGRAM REPORT: CPT

## 2025-04-19 PROCEDURE — 99285 EMERGENCY DEPT VISIT HI MDM: CPT

## 2025-04-19 RX ORDER — MAGNESIUM, ALUMINUM HYDROXIDE 200-200 MG
30 TABLET,CHEWABLE ORAL EVERY 4 HOURS
Refills: 0 | Status: DISCONTINUED | OUTPATIENT
Start: 2025-04-19 | End: 2025-04-22

## 2025-04-19 RX ORDER — ONDANSETRON HCL/PF 4 MG/2 ML
4 VIAL (ML) INJECTION EVERY 8 HOURS
Refills: 0 | Status: DISCONTINUED | OUTPATIENT
Start: 2025-04-19 | End: 2025-04-22

## 2025-04-19 RX ORDER — MELATONIN 5 MG
3 TABLET ORAL AT BEDTIME
Refills: 0 | Status: DISCONTINUED | OUTPATIENT
Start: 2025-04-19 | End: 2025-04-22

## 2025-04-19 RX ORDER — DIAZEPAM 2 MG/1
10 TABLET ORAL
Refills: 0 | Status: DISCONTINUED | OUTPATIENT
Start: 2025-04-19 | End: 2025-04-19

## 2025-04-19 RX ORDER — FOLIC ACID 1 MG/1
1 TABLET ORAL DAILY
Refills: 0 | Status: DISCONTINUED | OUTPATIENT
Start: 2025-04-19 | End: 2025-04-22

## 2025-04-19 RX ORDER — DIAZEPAM 2 MG/1
10 TABLET ORAL
Refills: 0 | Status: DISCONTINUED | OUTPATIENT
Start: 2025-04-19 | End: 2025-04-22

## 2025-04-19 RX ORDER — B1/B2/B3/B5/B6/B12/VIT C/FOLIC 500-0.5 MG
1 TABLET ORAL DAILY
Refills: 0 | Status: DISCONTINUED | OUTPATIENT
Start: 2025-04-19 | End: 2025-04-22

## 2025-04-19 RX ORDER — ACETAMINOPHEN 500 MG/5ML
650 LIQUID (ML) ORAL EVERY 6 HOURS
Refills: 0 | Status: DISCONTINUED | OUTPATIENT
Start: 2025-04-19 | End: 2025-04-22

## 2025-04-19 RX ADMIN — DIAZEPAM 10 MILLIGRAM(S): 2 TABLET ORAL at 23:41

## 2025-04-19 RX ADMIN — Medication 1000 MILLILITER(S): at 23:43

## 2025-04-19 RX ADMIN — DIAZEPAM 10 MILLIGRAM(S): 2 TABLET ORAL at 20:44

## 2025-04-19 NOTE — ED ADULT NURSE REASSESSMENT NOTE - NS ED NURSE REASSESS COMMENT FT1
Patient states "I want to die." dr. Alberts made aware of SI. Patient placed on 1:1 for risk harm to self. Belongings collected.

## 2025-04-19 NOTE — ED ADULT NURSE NOTE - SUICIDE SCREENING QUESTION 2
Is This A New Presentation, Or A Follow-Up?: Skin Lesions How Severe Is Your Skin Lesion?: mild Have Your Skin Lesions Been Treated?: not been treated Patient unable to complete

## 2025-04-19 NOTE — H&P ADULT - TIME BILLING
coordination of care with ER physician and ER RN, obtaining history, performing a physical examination, reviewing and interpreting labs and imaging, ordering further studies and tests, attempting to complete medication reconciliation to the best of my ability, and documentation as above.

## 2025-04-19 NOTE — CONSULT NOTE ADULT - SUBJECTIVE AND OBJECTIVE BOX
Patient is a 60y old  Male who presents with a chief complaint of ETOH withdrawal.     HPI: 59-year-old male with pmh of acute liver failure, chronic alcohol abuse, gastritis, peptic ulcer disease, Jacque henning tears, frequent hospitalizations for etoh withdrawal w/ delirium tremens, Fall 24' prolonged hospitalization @ [facility] for hypoxic respiratory failure requiring intubation due to cerebral edema and AMS (s/p Plex x3, mannitol and hypertonic saline). Now relapsed and presents intoxicated with s/s of withdrawal.     Allergies: No Known Allergies      MEDICATIONS  NEURO  Meds:   RESPIRATORY    Meds:   CARDIOVASCULAR  Meds:   GI/NUTRITION  Meds:   GENITOURINARY  Meds:   HEMATOLOGIC  Meds:   [x] VTE Prophylaxis  INFECTIOUS DISEASES  Meds:   ENDOCRINE  CAPILLARY BLOOD GLUCOSE      POCT Blood Glucose.: 124 mg/dL (19 Apr 2025 15:35)    Meds:   OTHER MEDICATIONS:  :    Drug Dosing Weight  Height (cm): 170.2 (19 Apr 2025 15:19)  Weight (kg): 80.7 (19 Apr 2025 15:19)  BMI (kg/m2): 27.9 (19 Apr 2025 15:19)  BSA (m2): 1.92 (19 Apr 2025 15:19)    PAST MEDICAL & SURGICAL HISTORY:  PUD (peptic ulcer disease)  EtOH dependence  Gastritis  Elevated lipase  Jacque Henning Tear     FAMILY HISTORY:  FH: HTN (hypertension)      SOCIAL HISTORY:  - Active drinker     ADVANCE DIRECTIVES: Presumed full code     REVIEW OF SYSTEMS: Unable to Assess Fully      ICU Vital Signs Last 24 Hrs  T(C): 36.4 (19 Apr 2025 15:19), Max: 36.4 (19 Apr 2025 15:19)  T(F): 97.5 (19 Apr 2025 15:19), Max: 97.5 (19 Apr 2025 15:19)  HR: 114 (19 Apr 2025 18:48) (102 - 114)  BP: 144/128 (19 Apr 2025 18:48) (135/98 - 144/128)  BP(mean): 135 (19 Apr 2025 18:48) (135 - 135)  ABP: --  ABP(mean): --  RR: 22 (19 Apr 2025 18:48) (19 - 22)  SpO2: 100% (19 Apr 2025 18:48) (99% - 100%)    O2 Parameters below as of 19 Apr 2025 18:48  Patient On (Oxygen Delivery Method): room air        PHYSICAL EXAM:  Neuro: tremulous, confused    HEENT: Pupils equal and symmetrically reactive to light.  PULM: Clear to auscultation bilaterally.  CVS: Regular rate and rhythm, no murmurs, rubs, or gallops.  ABD: Soft, nondistended, no masses.  EXT: b/l edema upper extremity   SKIN: Warm and well perfused, no rashes.    LABS:  CBC Full  -  ( 19 Apr 2025 17:43 )  WBC Count : 2.60 K/uL  RBC Count : 4.91 M/uL  Hemoglobin : 10.6 g/dL  Hematocrit : 33.6 %  Platelet Count - Automated : 161 K/uL  Mean Cell Volume : 68.4 fl  Mean Cell Hemoglobin : 21.6 pg  Mean Cell Hemoglobin Concentration : 31.5 g/dL  Auto Neutrophil # : x  Auto Lymphocyte # : x  Auto Monocyte # : x  Auto Eosinophil # : x  Auto Basophil # : x  Auto Neutrophil % : x  Auto Lymphocyte % : x  Auto Monocyte % : x  Auto Eosinophil % : x  Auto Basophil % : x    04-19    144  |  108  |  7   ----------------------------<  84  4.2   |  27  |  1.09    Ca    8.8      19 Apr 2025 17:43  Phos  3.2     04-17  Mg     1.9     04-19    TPro  8.5[H]  /  Alb  3.5  /  TBili  1.2  /  DBili  x   /  AST  504[H]  /  ALT  214[H]  /  AlkPhos  98  04-19    CAPILLARY BLOOD GLUCOSE      POCT Blood Glucose.: 124 mg/dL (19 Apr 2025 15:35)    PT/INR - ( 19 Apr 2025 17:43 )   PT: 13.6 sec;   INR: 1.17 ratio         PTT - ( 19 Apr 2025 17:43 )  PTT:33.0 sec  Urinalysis Basic - ( 19 Apr 2025 17:43 )    Color: x / Appearance: x / SG: x / pH: x  Gluc: 84 mg/dL / Ketone: x  / Bili: x / Urobili: x   Blood: x / Protein: x / Nitrite: x   Leuk Esterase: x / RBC: x / WBC x   Sq Epi: x / Non Sq Epi: x / Bacteria: x      RADIOLOGY: Reviewed.

## 2025-04-19 NOTE — CONSULT NOTE ADULT - ASSESSMENT
Assessment: 59-year-old male with pmh of acute liver failure, chronic alcohol abuse, gastritis, peptic ulcer disease, Jacque barragan tears, frequent hospitalizations for etoh withdrawal w/ delirium tremens, Fall 24' prolonged hospitalization @ [facility] for hypoxic respiratory failure requiring intubation due to cerebral edema and AMS (s/p Plex x3, mannitol and hypertonic saline). Now relapsed and presents intoxicated with s/s of withdrawal.  ICU consulted for ETOH withdrawal.     Recs;   - Upon assessment patient tremulous and mildly tachycardic  - Continue CIWA protocal   - MV, folate, thiamine  - ETOH level + 342  - Elevated liver enzymes so cannot phenobarb load at this time but would give high dose ativan IV taper to avoid worsening DTs   - RUQ for elevated liver enzymes has hx of liver failure though 2 days ago was normal   - Neutropenic, infectious work up   - At this time does not require ICU level of care, if worsening status please reconsult.       d/w dr. Perez Assessment: 59-year-old male with pmh of acute liver failure, chronic alcohol abuse, gastritis, peptic ulcer disease, Jacque barragan tears, frequent hospitalizations for etoh withdrawal w/ delirium tremens, Fall 24' prolonged hospitalization @ [facility] for hypoxic respiratory failure requiring intubation due to cerebral edema and AMS (s/p Plex x3, mannitol and hypertonic saline). Now relapsed and presents intoxicated with s/s of withdrawal.  ICU consulted for ETOH withdrawal.     Recs;   - Upon assessment patient tremulous and mildly tachycardic  - Continue CIWA protocal   - MV, folate, thiamine  - ETOH level + 342  - Elevated liver enzymes so cannot phenobarb load at this time but would give high dose ativan IV taper to avoid worsening DTs   - RUQ for elevated liver enzymes has hx of liver failure though 2 days ago was normal   - Neutropenic, infectious work up   - Patient states he "does not want to do this anymore, wants to die here"; continue constant observation will eventually need psych consult    - At this time does not require ICU level of care, if worsening status please reconsult.       d/w dr. Perez

## 2025-04-19 NOTE — ED ADULT NURSE NOTE - OBJECTIVE STATEMENT
Patient is a 60 year old male w/ PMH ETOH dependence, PUD, gastritis presenting for AMS. As per EMS, patient was sat 90s on RA and arrived bagging patient. Patient placed on NRB and sat 100%. Patient awakens to voice and pain. Is not currently speaking. Patient placed on cardiac monitor. Poor historian at this time.

## 2025-04-19 NOTE — H&P ADULT - NSICDXPASTMEDICALHX_GEN_ALL_CORE_FT
PAST MEDICAL HISTORY:  EtOH dependence     History of cirrhosis of liver     PUD (peptic ulcer disease)

## 2025-04-19 NOTE — H&P ADULT - HISTORY OF PRESENT ILLNESS
Stefan Degroot is a 60 year old male with PMHx EtOH abuse (c/b prior withdrawals with delirium tremors and previously requiring intubation due to hypoxia), cirrhosis, and PUD who presented to the ED on 4/19/25 for complaints of alcohol intoxication.    History primarily obtained from ER physician documentation as patient is unable to provide history at this time due to intoxication. As per ER triage RN, patient was brought in by EMS for altered mental status and shallow breathing. EMS was bagging patient upon arrival. As per ER nurse manager, patient tried to grab scissors from a nurse in order to kill himself "to end it all." Placed on 1:1 for constant observation for suicidal ideation while intoxicated.    In the ED, afebrile, HR as elevated as 114, BP as elevated as 144/128, RR as elevated as 22. WBC 2.60K, hgb 10.6, , . VBG 7.38 / 49 / 36 / 29. Acetaminophen level negative. Blood alcohol level 342. COVID/influenza/RSV negative. Received 1L NS bolus and diazepam 10 mg IV. Evaluated by ICU who states no indication for ICU level of care at this time.

## 2025-04-19 NOTE — ED ADULT NURSE NOTE - NSFALLRISKINTERV_ED_ALL_ED
Assistance OOB with selected safe patient handling equipment if applicable/Assistance with ambulation/Communicate fall risk and risk factors to all staff, patient, and family/Monitor gait and stability/Provide visual cue: yellow wristband, yellow gown, etc/Reinforce activity limits and safety measures with patient and family/Call bell, personal items and telephone in reach/Instruct patient to call for assistance before getting out of bed/chair/stretcher/Non-slip footwear applied when patient is off stretcher/[place] to call system/Physically safe environment - no spills, clutter or unnecessary equipment/Purposeful Proactive Rounding/Room/bathroom lighting operational, light cord in reach Assistance OOB with selected safe patient handling equipment if applicable/Assistance with ambulation/Communicate fall risk and risk factors to all staff, patient, and family/Monitor gait and stability/Monitor for mental status changes and reorient to person, place, and time, as needed/Provide visual cue: yellow wristband, yellow gown, etc/Reinforce activity limits and safety measures with patient and family/Toileting schedule using arm’s reach rule for commode and bathroom/Use of alarms - bed, stretcher, chair and/or video monitoring/Call bell, personal items and telephone in reach/Instruct patient to call for assistance before getting out of bed/chair/stretcher/Non-slip footwear applied when patient is off stretcher/[place] to call system/Physically safe environment - no spills, clutter or unnecessary equipment/Purposeful Proactive Rounding/Room/bathroom lighting operational, light cord in reach

## 2025-04-19 NOTE — ED PROVIDER NOTE - CADM POA URETHRAL CATHETER
Chief complaint: bites      Katelin Mcguire, is a 87 year old female who presents today for complaints of a tick bite.  Patient states that she was showering this morning when she thought she felt a scab on her R outer hip, but instead found a tick.  She pulled out the tick, head intact, and noticed a bullseye rash.  Per patient, she was helping her  in the yard on Thursday 5-19-22 and that is the date that she was probably bit.    I have reviewed the past medical history, family history, social history, medications and allergies listed in the medical record as obtained by my nursing staff and support staff and agree with their documentation.    REVIEW OF SYSTEMS    Constitutional: Patient denies fever, chills or tiredness.  Respiratory: Denies cough, shortness of breath.   Cardiovascular: Denies chest pain.  GI: Denies abdominal pain, nausea, vomiting.  Musculoskeletal: Denies back pain, neck pain, joint pain, or body aches.  Integument: Denies itching.      PHYSICAL EXAM:  Visit Vitals  /77   Pulse 78   Temp 98 °F (36.7 °C)   Wt 64 kg (141 lb)   SpO2 99%   BMI 22.76 kg/m²     General:  Pleasant, well appearing in no acute distress.  Alert and orientated times three.   Head/Eyes/Ears/Nose/Throat:   Pupils equal in size and regular reaction to light, sclera are anicteric and conjunctiva clear.  Mucus membranes pink and moist.     Cardiovascular:  Irregular rate and rhythm, no murmur.    Chest:  Clear to auscultation bilaterally without wheezes, rales or rhonchi.  Respiratory effort within normal limits.   Musculoskeletal:  Extremities without swelling or deformity.  Skin:  Warm and dry without lesions, rashes or jaundice.  Bullseye rash present on R hip.    Orders Placed This Encounter   • doxycycline hyclate (VIBRAMYCIN) 100 MG capsule       ASSESSMENT & PLAN  Tick bite of right hip, initial encounter  One time prophylactic dose of 200 mg doxycycline.  Tick Bite instructions given via AVS.    Follow  up in clinic or urgent care if symptoms persist or worsen.      Dianne Dutta  The patient was seen by the Nurse Practitioner student and myself and the plan of care was mutually agreed upon.         I have personally performed the exam, formulated the clinical plan, reviewed, edited the note, and entirely responsible for its content.     Elena Monique, PALAK       No

## 2025-04-19 NOTE — ED ADULT NURSE NOTE - ED STAT RN HANDOFF DETAILS
Received report from PRETTY Gray. Identified pt and updated on plan of care. Safety and fall precautions in place. Pt is a&ox2.

## 2025-04-19 NOTE — H&P ADULT - ASSESSMENT
Stefan Degroot is a 60 year old male with PMHx EtOH abuse (c/b prior withdrawals with delirium tremors and previously requiring intubation due to hypoxia), cirrhosis, and PUD who presented to the ED on 4/19/25 for complaints of alcohol intoxication and admitted for alcohol intoxication with withdrawal.    Alcohol intoxication with withdrawal  Unable to obtain history regarding how much patient typically drinks  Blood alcohol level 342 on admission  VBG 7.38 / 49 / 36 / 29, acetaminophen level negative, COVID/influenza/RSV negative  S/p 1L NS bolus and diazepam 10 mg IV in the ED  CIWA protocol, diazepam PRN, unable to order ativan as per ICU recs due to shortage  Unable to order phenobarbital due to transaminitis  Seizure and aspiration precautions  PTA thiamine, folic acid, multivitamins  Telemetry  ICU recommendations appreciated    Transaminitis, suspect secondary to EtOH abuse   and  on admission  R factor 6.6 which suggests hepatocellular injury  Avoid hepatotoxic agents  Monitor LFTs    Suicidal ideation  As per ER nurse manager, patient tried to grab scissors from a nurse in order to kill himself "to end it all"  Continue 1:1 sitter for safety  Will need psychiatry consult when clinically sober      Chronic medical conditions:   Cirrhosis: PTA rifaximin 550 mg BID, lactulose q8  Hx of PUD: PTA pantoprazole DR 40 mg  Microcytic/normocytic anemia, chronic: hgb 10.6 on admission, appears around baseline, no signs of active bleeding    Medication reconciliation completed using discharge med rec from [facility] from 10/23/24 as patient is unable to provide med list. Please call his pharmacy for med list in AM.

## 2025-04-19 NOTE — ED PROVIDER NOTE - CLINICAL SUMMARY MEDICAL DECISION MAKING FREE TEXT BOX
70-year-old male pmhx of alcohol abuse, alcohol withdrawal comes to ED w/ altered mental status. Patient found outside altered by bystander prompting EMS call.  On arrival to the emergency department patient initially tired after repositioning was crying in the stretcher. Their pain/symptom is moderate, constant, non mediating with rest. Otherwise ROS negative.    General: Intoxicated appearing  HEENT: NCAT, PERRL   Cardiac: RRR, no murmurs, 2+ peripheral pulses   Chest: CTAB  Abdomen: soft, non-distended, bowel sounds present, no ttp, no rebound or guarding   Extremities: no peripheral edema, calf tenderness, or leg size discrepancies   Skin: no rashes   Neuro: Awake, 5+motor, sensory grossly intact   Psych: Sad mood    Impression: 70-year-old male pmhx of alcohol abuse, alcohol withdrawal comes to ED w/ altered mental status. Their symptoms and exam findings are concerning for substance abuse, alcohol withdrawal, infection, metabolic abnormality.    Ordered labs, imaging, medications for diagnosis, management, and treatment.    ICU consulted for alcohol withdrawal evaluation. 70-year-old male pmhx of alcohol abuse, alcohol withdrawal comes to ED w/ altered mental status. Patient found outside altered by bystander prompting EMS call.  On arrival to the emergency department patient initially tired after repositioning was crying in the stretcher. Their pain/symptom is moderate, constant, non mediating with rest. Otherwise ROS negative.    General: Intoxicated appearing  HEENT: NCAT, PERRL   Cardiac: RRR, no murmurs, 2+ peripheral pulses   Chest: CTAB  Abdomen: soft, non-distended, bowel sounds present, no ttp, no rebound or guarding   Extremities: no peripheral edema, calf tenderness, or leg size discrepancies   Skin: no rashes   Neuro: Awake, 5+motor, sensory grossly intact   Psych: Sad mood    Impression: 70-year-old male pmhx of alcohol abuse, alcohol withdrawal comes to ED w/ altered mental status. Their symptoms and exam findings are concerning for substance abuse, alcohol withdrawal, infection, metabolic abnormality.    Ordered labs, imaging, medications for diagnosis, management, and treatment.    ICU consulted for alcohol withdrawal evaluation. Patient reported suicidal ideation during intoxication as alcohol levels resulted at 342 and was placed on a 1:1.

## 2025-04-19 NOTE — H&P ADULT - NSHPLABSRESULTS_GEN_ALL_CORE
10.6   2.60  )-----------( 161      ( 19 Apr 2025 17:43 )             33.6     144  |  108  |  7   ----------------------------<  84     04-19  4.2   |  27  |  1.09    Ca    8.8      19 Apr 2025 17:43  Mg     1.9     04-19    TPro  8.5[H]  /  Alb  3.5  /  TBili  1.2  /  DBili  x   /  AST  504[H]  /  ALT  214[H]  /  AlkPhos  98  04-19    PT/INR: 13.6/1.17 (04-19-25 @ 17:43)  PTT: 33.0 (04-19-25 @ 17:43)    17:42 - VBG - pH: 7.38  | pCO2: 49    | pO2: 36    | Lactate: 2.90

## 2025-04-19 NOTE — ED ADULT TRIAGE NOTE - CHIEF COMPLAINT QUOTE
brought by ems as altered mental status and shallow breathing .Initial saturation was 90 and ems was  bagging the patient as they arrived . h/o alcohol abuse

## 2025-04-19 NOTE — H&P ADULT - NSHPPHYSICALEXAM_GEN_ALL_CORE
T(C): 36.4 (04-19-25 @ 15:19), Max: 36.4 (04-19-25 @ 15:19)  HR: 101 (04-19-25 @ 23:12) (101 - 114)  BP: 132/99 (04-19-25 @ 23:12) (120/87 - 144/128)  RR: 16 (04-19-25 @ 23:12) (16 - 22)  SpO2: 96% (04-19-25 @ 23:12) (95% - 100%)    CONSTITUTIONAL: no apparent distress  EYES: PERRLA and symmetric, EOMI  RESP: tachypneic, CTA b/l  CV: tachycardic, regular rhythm  GI: Soft, NT, ND

## 2025-04-19 NOTE — ED ADULT TRIAGE NOTE - BIRTH SEX
Problem: Falls - Risk of  Goal: *Absence of Falls  Description: Document Luis Mcnulty Fall Risk and appropriate interventions in the flowsheet. Outcome: Progressing Towards Goal  Note: Fall Risk Interventions:  Mobility Interventions: Assess mobility with egress test    Mentation Interventions: Reorient patient    Medication Interventions: Teach patient to arise slowly    Elimination Interventions: Toilet paper/wipes in reach    History of Falls Interventions: Door open when patient unattended  Problem: Depressed Mood (Adult/Pediatric)  Goal: *STG: Demonstrates reduction in symptoms and increase in insight into coping skills/future focused  Outcome: Progressing Towards Goal     Problem: Altered Thought Process (Adult/Pediatric)  Goal: *STG: Remains safe in hospital  Outcome: Progressing Towards Goal  Goal: *STG: Complies with medication therapy  Outcome: Progressing Towards Goal  Goal: Interventions  Outcome: Progressing Towards Goal     Patient received awake in bed. Denies SI/HI. Isolative to self. Remains medication and meal compliant. Will continue to monitor q15 minutes for safety. Male

## 2025-04-20 DIAGNOSIS — D50.9 IRON DEFICIENCY ANEMIA, UNSPECIFIED: ICD-10-CM

## 2025-04-20 DIAGNOSIS — R45.851 SUICIDAL IDEATIONS: ICD-10-CM

## 2025-04-20 DIAGNOSIS — F10.239 ALCOHOL DEPENDENCE WITH WITHDRAWAL, UNSPECIFIED: ICD-10-CM

## 2025-04-20 DIAGNOSIS — Z87.19 PERSONAL HISTORY OF OTHER DISEASES OF THE DIGESTIVE SYSTEM: ICD-10-CM

## 2025-04-20 DIAGNOSIS — F10.929 ALCOHOL USE, UNSPECIFIED WITH INTOXICATION, UNSPECIFIED: ICD-10-CM

## 2025-04-20 DIAGNOSIS — R74.01 ELEVATION OF LEVELS OF LIVER TRANSAMINASE LEVELS: ICD-10-CM

## 2025-04-20 PROBLEM — K29.70 GASTRITIS, UNSPECIFIED, WITHOUT BLEEDING: Chronic | Status: INACTIVE | Noted: 2019-10-19 | Resolved: 2025-04-20

## 2025-04-20 PROBLEM — R74.8 ABNORMAL LEVELS OF OTHER SERUM ENZYMES: Chronic | Status: INACTIVE | Noted: 2024-02-19 | Resolved: 2025-04-20

## 2025-04-20 LAB
ALBUMIN SERPL ELPH-MCNC: 2.9 G/DL — LOW (ref 3.3–5)
ALP SERPL-CCNC: 84 U/L — SIGNIFICANT CHANGE UP (ref 40–120)
ALT FLD-CCNC: 175 U/L — HIGH (ref 12–78)
ANION GAP SERPL CALC-SCNC: 7 MMOL/L — SIGNIFICANT CHANGE UP (ref 5–17)
APPEARANCE UR: CLEAR — SIGNIFICANT CHANGE UP
AST SERPL-CCNC: 359 U/L — HIGH (ref 15–37)
BILIRUB SERPL-MCNC: 1.4 MG/DL — HIGH (ref 0.2–1.2)
BILIRUB UR-MCNC: NEGATIVE — SIGNIFICANT CHANGE UP
BUN SERPL-MCNC: 8 MG/DL — SIGNIFICANT CHANGE UP (ref 7–23)
CALCIUM SERPL-MCNC: 8.2 MG/DL — LOW (ref 8.5–10.1)
CHLORIDE SERPL-SCNC: 111 MMOL/L — HIGH (ref 96–108)
CO2 SERPL-SCNC: 25 MMOL/L — SIGNIFICANT CHANGE UP (ref 22–31)
COLOR SPEC: SIGNIFICANT CHANGE UP
CREAT SERPL-MCNC: 0.99 MG/DL — SIGNIFICANT CHANGE UP (ref 0.5–1.3)
DIFF PNL FLD: NEGATIVE — SIGNIFICANT CHANGE UP
EGFR: 88 ML/MIN/1.73M2 — SIGNIFICANT CHANGE UP
EGFR: 88 ML/MIN/1.73M2 — SIGNIFICANT CHANGE UP
GLUCOSE SERPL-MCNC: 93 MG/DL — SIGNIFICANT CHANGE UP (ref 70–99)
GLUCOSE UR QL: NEGATIVE MG/DL — SIGNIFICANT CHANGE UP
HCT VFR BLD CALC: 28.7 % — LOW (ref 39–50)
HGB BLD-MCNC: 9 G/DL — LOW (ref 13–17)
KETONES UR-MCNC: 15 MG/DL
LACTATE SERPL-SCNC: 2.5 MMOL/L — HIGH (ref 0.7–2)
LEUKOCYTE ESTERASE UR-ACNC: ABNORMAL
MCHC RBC-ENTMCNC: 21.3 PG — LOW (ref 27–34)
MCHC RBC-ENTMCNC: 31.4 G/DL — LOW (ref 32–36)
MCV RBC AUTO: 68 FL — LOW (ref 80–100)
NITRITE UR-MCNC: NEGATIVE — SIGNIFICANT CHANGE UP
NRBC BLD AUTO-RTO: 0 /100 WBCS — SIGNIFICANT CHANGE UP (ref 0–0)
PCP SPEC-MCNC: SIGNIFICANT CHANGE UP
PH UR: 6 — SIGNIFICANT CHANGE UP (ref 5–8)
PLATELET # BLD AUTO: 128 K/UL — LOW (ref 150–400)
POTASSIUM SERPL-MCNC: 3.8 MMOL/L — SIGNIFICANT CHANGE UP (ref 3.5–5.3)
POTASSIUM SERPL-SCNC: 3.8 MMOL/L — SIGNIFICANT CHANGE UP (ref 3.5–5.3)
PROT SERPL-MCNC: 7 GM/DL — SIGNIFICANT CHANGE UP (ref 6–8.3)
PROT UR-MCNC: 30 MG/DL
RBC # BLD: 4.22 M/UL — SIGNIFICANT CHANGE UP (ref 4.2–5.8)
RBC # FLD: 18.7 % — HIGH (ref 10.3–14.5)
SODIUM SERPL-SCNC: 143 MMOL/L — SIGNIFICANT CHANGE UP (ref 135–145)
SP GR SPEC: 1.02 — SIGNIFICANT CHANGE UP (ref 1–1.03)
UROBILINOGEN FLD QL: 1 MG/DL — SIGNIFICANT CHANGE UP (ref 0.2–1)
WBC # BLD: 2.91 K/UL — LOW (ref 3.8–10.5)
WBC # FLD AUTO: 2.91 K/UL — LOW (ref 3.8–10.5)

## 2025-04-20 PROCEDURE — 90792 PSYCH DIAG EVAL W/MED SRVCS: CPT | Mod: 95

## 2025-04-20 PROCEDURE — 99232 SBSQ HOSP IP/OBS MODERATE 35: CPT

## 2025-04-20 PROCEDURE — 76937 US GUIDE VASCULAR ACCESS: CPT | Mod: 26

## 2025-04-20 PROCEDURE — 36410 VNPNXR 3YR/> PHY/QHP DX/THER: CPT

## 2025-04-20 PROCEDURE — 99222 1ST HOSP IP/OBS MODERATE 55: CPT

## 2025-04-20 RX ORDER — INFLUENZA A VIRUS A/IDAHO/07/2018 (H1N1) ANTIGEN (MDCK CELL DERIVED, PROPIOLACTONE INACTIVATED, INFLUENZA A VIRUS A/INDIANA/08/2018 (H3N2) ANTIGEN (MDCK CELL DERIVED, PROPIOLACTONE INACTIVATED), INFLUENZA B VIRUS B/SINGAPORE/INFTT-16-0610/2016 ANTIGEN (MDCK CELL DERIVED, PROPIOLACTONE INACTIVATED), INFLUENZA B VIRUS B/IOWA/06/2017 ANTIGEN (MDCK CELL DERIVED, PROPIOLACTONE INACTIVATED) 15; 15; 15; 15 UG/.5ML; UG/.5ML; UG/.5ML; UG/.5ML
0.5 INJECTION, SUSPENSION INTRAMUSCULAR ONCE
Refills: 0 | Status: COMPLETED | OUTPATIENT
Start: 2025-04-20 | End: 2025-04-20

## 2025-04-20 RX ORDER — POLYETHYLENE GLYCOL 3350 17 G/17G
17 POWDER, FOR SOLUTION ORAL DAILY
Refills: 0 | Status: DISCONTINUED | OUTPATIENT
Start: 2025-04-20 | End: 2025-04-22

## 2025-04-20 RX ORDER — LACTULOSE 10 G/15ML
30 SOLUTION ORAL EVERY 8 HOURS
Refills: 0 | Status: DISCONTINUED | OUTPATIENT
Start: 2025-04-20 | End: 2025-04-22

## 2025-04-20 RX ORDER — MAGNESIUM OXIDE 400 MG
400 TABLET ORAL
Refills: 0 | Status: DISCONTINUED | OUTPATIENT
Start: 2025-04-20 | End: 2025-04-22

## 2025-04-20 RX ADMIN — LACTULOSE 20 GRAM(S): 10 SOLUTION ORAL at 13:24

## 2025-04-20 RX ADMIN — FOLIC ACID 1 MILLIGRAM(S): 1 TABLET ORAL at 11:29

## 2025-04-20 RX ADMIN — POLYETHYLENE GLYCOL 3350 17 GRAM(S): 17 POWDER, FOR SOLUTION ORAL at 21:38

## 2025-04-20 RX ADMIN — Medication 4 GRAM(S): at 05:58

## 2025-04-20 RX ADMIN — Medication 40 MILLIGRAM(S): at 05:50

## 2025-04-20 RX ADMIN — Medication 1 TABLET(S): at 11:29

## 2025-04-20 RX ADMIN — Medication 100 MILLIGRAM(S): at 11:30

## 2025-04-20 RX ADMIN — DIAZEPAM 10 MILLIGRAM(S): 2 TABLET ORAL at 05:51

## 2025-04-20 RX ADMIN — LACTULOSE 30 GRAM(S): 10 SOLUTION ORAL at 21:38

## 2025-04-20 RX ADMIN — DIAZEPAM 10 MILLIGRAM(S): 2 TABLET ORAL at 23:59

## 2025-04-20 RX ADMIN — DIAZEPAM 10 MILLIGRAM(S): 2 TABLET ORAL at 13:25

## 2025-04-20 RX ADMIN — Medication 650 MILLIGRAM(S): at 11:00

## 2025-04-20 RX ADMIN — DIAZEPAM 10 MILLIGRAM(S): 2 TABLET ORAL at 20:58

## 2025-04-20 RX ADMIN — Medication 2 MILLIGRAM(S): at 19:04

## 2025-04-20 RX ADMIN — Medication 400 MILLIGRAM(S): at 18:08

## 2025-04-20 RX ADMIN — Medication 4 GRAM(S): at 13:23

## 2025-04-20 RX ADMIN — LACTULOSE 30 GRAM(S): 10 SOLUTION ORAL at 05:50

## 2025-04-20 RX ADMIN — DIAZEPAM 10 MILLIGRAM(S): 2 TABLET ORAL at 09:59

## 2025-04-20 RX ADMIN — DIAZEPAM 10 MILLIGRAM(S): 2 TABLET ORAL at 01:26

## 2025-04-20 RX ADMIN — Medication 2 MILLIGRAM(S): at 18:08

## 2025-04-20 RX ADMIN — Medication 650 MILLIGRAM(S): at 09:59

## 2025-04-20 RX ADMIN — DIAZEPAM 10 MILLIGRAM(S): 2 TABLET ORAL at 07:53

## 2025-04-20 RX ADMIN — Medication 4 GRAM(S): at 21:37

## 2025-04-20 RX ADMIN — DIAZEPAM 10 MILLIGRAM(S): 2 TABLET ORAL at 03:54

## 2025-04-20 NOTE — PROGRESS NOTE ADULT - SUBJECTIVE AND OBJECTIVE BOX
Patient is a 60y old  Male who presents with a chief complaint of Alcohol intoxication with withdrawal (19 Apr 2025 23:57)    INTERVAL HPI/OVERNIGHT EVENTS: No acute events overnight. HD stable.     MEDICATIONS  (STANDING):  cholestyramine Powder (Sugar-Free) 4 Gram(s) Oral three times a day  folic acid 1 milliGRAM(s) Oral daily  influenza   Vaccine 0.5 milliLiter(s) IntraMuscular once  lactulose Syrup 30 Gram(s) Oral every 8 hours  magnesium oxide 400 milliGRAM(s) Oral two times a day with meals  multivitamin 1 Tablet(s) Oral daily  pantoprazole    Tablet 40 milliGRAM(s) Oral before breakfast  polyethylene glycol 3350 17 Gram(s) Oral daily  rifAXIMin 550 milliGRAM(s) Oral two times a day  thiamine 100 milliGRAM(s) Oral daily    MEDICATIONS  (PRN):  acetaminophen     Tablet .. 650 milliGRAM(s) Oral every 6 hours PRN Temp greater or equal to 38C (100.4F), Mild Pain (1 - 3)  aluminum hydroxide/magnesium hydroxide/simethicone Suspension 30 milliLiter(s) Oral every 4 hours PRN Dyspepsia  diazepam  Injectable 10 milliGRAM(s) IV Push every 1 hour PRN CIWA 8-14  melatonin 3 milliGRAM(s) Oral at bedtime PRN Insomnia  ondansetron Injectable 4 milliGRAM(s) IV Push every 8 hours PRN Nausea and/or Vomiting      Allergies    No Known Allergies    Intolerances        REVIEW OF SYSTEMS: all negative with exception of above    Vital Signs Last 24 Hrs  T(C): 37.1 (20 Apr 2025 10:31), Max: 37.1 (20 Apr 2025 10:31)  T(F): 98.7 (20 Apr 2025 10:31), Max: 98.7 (20 Apr 2025 10:31)  HR: 101 (20 Apr 2025 10:31) (89 - 114)  BP: 117/83 (20 Apr 2025 10:31) (108/57 - 145/91)  BP(mean): 108 (19 Apr 2025 23:12) (108 - 135)  RR: 17 (20 Apr 2025 10:31) (16 - 22)  SpO2: 97% (20 Apr 2025 10:31) (95% - 100%)    Parameters below as of 20 Apr 2025 10:31  Patient On (Oxygen Delivery Method): room air    PHYSICAL EXAM:  GENERAL: NAD, well-groomed  NERVOUS SYSTEM:  Alert & Oriented X3, Good concentration; Motor Strength 5/5 B/L upper and lower extremities; DTRs 2+ intact and symmetric  CHEST/LUNG: Clear to percussion bilaterally; No rales, rhonchi, wheezing, or rubs  HEART: Regular rate and rhythm; No murmurs, rubs, or gallops  ABDOMEN: Soft, Nontender, Nondistended; Bowel sounds present  EXTREMITIES:  2+ Peripheral Pulses, No clubbing, cyanosis, or edema    LABS:                        9.0    2.91  )-----------( 128      ( 20 Apr 2025 07:35 )             28.7     04-20    143  |  111[H]  |  8   ----------------------------<  93  3.8   |  25  |  0.99    Ca    8.2[L]      20 Apr 2025 07:35  Mg     1.9     04-19    TPro  7.0  /  Alb  2.9[L]  /  TBili  1.4[H]  /  DBili  x   /  AST  359[H]  /  ALT  175[H]  /  AlkPhos  84  04-20    PT/INR - ( 19 Apr 2025 17:43 )   PT: 13.6 sec;   INR: 1.17 ratio         PTT - ( 19 Apr 2025 17:43 )  PTT:33.0 sec  Urinalysis Basic - ( 20 Apr 2025 07:35 )    Color: x / Appearance: x / SG: x / pH: x  Gluc: 93 mg/dL / Ketone: x  / Bili: x / Urobili: x   Blood: x / Protein: x / Nitrite: x   Leuk Esterase: x / RBC: x / WBC x   Sq Epi: x / Non Sq Epi: x / Bacteria: x      CAPILLARY BLOOD GLUCOSE      POCT Blood Glucose.: 124 mg/dL (19 Apr 2025 15:35)      RADIOLOGY & ADDITIONAL TESTS:    Imaging Personally Reviewed:  [ ] YES  [ ] NO    Consultant(s) Notes Reviewed:  [ ] YES  [ ] NO    Care Discussed with Consultants/Other Providers [ ] YES  [ ] NO

## 2025-04-20 NOTE — PATIENT PROFILE ADULT - FALL HARM RISK - HARM RISK INTERVENTIONS

## 2025-04-20 NOTE — BH CONSULTATION LIAISON ASSESSMENT NOTE - NSBHCHARTREVIEWINVESTIGATE_PSY_A_CORE FT
Ventricular Rate 84 BPM    Atrial Rate 84 BPM    P-R Interval 130 ms    QRS Duration 80 ms    Q-T Interval 398 ms    QTC Calculation(Bazett) 470 ms    P Axis 34 degrees    R Axis 2 degrees    T Axis 14 degrees    Diagnosis Line NORMAL SINUS RHYTHM  NONSPECIFIC T WAVE ABNORMALITY  PROLONGED QT  ABNORMAL ECG  NO PREVIOUS ECGS AVAILABLE  Confirmed by MD SAQIB, DANIEL ([id number]) on 9/26/2024 6:18:29 PM

## 2025-04-20 NOTE — PROGRESS NOTE ADULT - ASSESSMENT
Stefan Degroot is a 60 year old male with PMHx EtOH abuse (c/b prior withdrawals with delirium tremors and previously requiring intubation due to hypoxia), cirrhosis, and PUD who presented to the ED on 4/19/25 for complaints of alcohol intoxication and admitted for alcohol intoxication with withdrawal.    Alcohol intoxication with withdrawal  Unable to obtain history regarding how much patient typically drinks  Blood alcohol level 342 on admission  VBG 7.38 / 49 / 36 / 29, acetaminophen level negative, COVID/influenza/RSV negative  S/p 1L NS bolus and diazepam 10 mg IV in the ED  CIWA protocol, diazepam PRN, unable to order ativan as per ICU recs due to shortage  Unable to order phenobarbital due to transaminitis  Seizure and aspiration precautions  PTA thiamine, folic acid, multivitamins  Telemetry  ICU recommendations appreciated    Transaminitis, suspect secondary to EtOH abuse   and  on admission  R factor 6.6 which suggests hepatocellular injury  Avoid hepatotoxic agents  Monitor LFTs    Suicidal ideation  As per ER nurse manager, patient tried to grab scissors from a nurse in order to kill himself "to end it all"  Continue 1:1 sitter for safety  -  c/s placed  - denies current SI and HI    Chronic medical conditions:   Cirrhosis: PTA rifaximin 550 mg BID, lactulose q8  Hx of PUD: PTA pantoprazole DR 40 mg  Microcytic/normocytic anemia, chronic: hgb 10.6 on admission, appears around baseline, no signs of active bleeding

## 2025-04-20 NOTE — BH CONSULTATION LIAISON ASSESSMENT NOTE - HPI (INCLUDE ILLNESS QUALITY, SEVERITY, DURATION, TIMING, CONTEXT, MODIFYING FACTORS, ASSOCIATED SIGNS AND SYMPTOMS)
59 years old male with PPHx of severe EtOH use d/o with numerous ICU admissions with Phenobarbital use and DT's with PMHx of alcoholic gastritis/esophagitis, PUD, HLD, hx of hypoxic respiratory failure requiring intubation due to aspiration PNA (most recent intubation Aril 2020),  staph PNA, COVID-19 infection Dec 2020 presented with abd pain at the OSH with subsequent transfer to [facility]; for whom psychiatry was consulted for psychiatric clearance for liver transplant. Patient noted to be AAOx3 on exam.    Patient is known to hospital and psychiatry team at [place].     Interview completed in egno.   Patient is calm and cooperative on approach. Patient is a poor historian as he does not engage well in interview. He states that he has not been using alcohol like he used to, he reports  5months of sobriety until he had a small drink a few days ago. Patient states that he came to the hospital for stomach pain. Patient states that he does not have any history of suicidal thoughts. Patient denies any current thoughts of self harm. He repeatedly complains of having symptoms and not being treated with medications. He states "I rather go home, at least at home I have my own pain medications."     Patient is a 59 years old domiciled, , employed male with pphx of severe EtOH use d/o with numerous ICU admissions with Phenobarbital use and DT's with PMHx of alcoholic gastritis/esophagitis, PUD, HLD, presented with abd pain in ED; admitted for etoh WD tx, psychiatry was consulted for suicidal ideation. Patient noted to be AAOx3 on exam.    Patient is known to hospital and psychiatry team at [place].     Interview completed in geno.   Patient is calm and cooperative on approach. Patient is a poor historian as he does not engage well in interview. He states that he has not been using alcohol like he used to, he reports  5months of sobriety until he had a small drink a few days ago. Patient states that he came to the hospital for stomach pain. Patient states that he does not have any history of suicidal thoughts. Patient denies any current thoughts of self harm. He repeatedly complains of having symptoms and not being treated with medications. He states "I rather go home, at least at home I have my own pain medications."     Patient is a 59 years old domiciled, , employed male with pphx of severe EtOH use d/o with numerous ICU admissions with Phenobarbital use and DT's with PMHx of alcoholic gastritis/esophagitis, PUD, HLD, presented with abd pain in ED; admitted for etoh WD tx, psychiatry was consulted for suicidal ideation. Patient noted to be AAOx3 on exam.    Patient is known to hospital and psychiatry team at [place].     Interview completed in geno as per preference of patient.  Patient is calm and cooperative on approach. Patient is a poor historian as he does not engage well in interview. He states that he has not been using alcohol "like he used to," he reports 5 months of sobriety until he had a "small drink" a few days ago. Patient states that he came to the hospital for stomach pain. He is denying any problems with drinking or hx of neg sequelae from alcohol use/withdrawal. Patient states that he does not have any history of suicidal thoughts. Patient denies any current thoughts of self harm. He repeatedly complains of having pain symptoms and that he is not being treated with enough medication. He states "I would rather go home, at least at home I have my own pain medications."

## 2025-04-21 DIAGNOSIS — F10.20 ALCOHOL DEPENDENCE, UNCOMPLICATED: ICD-10-CM

## 2025-04-21 DIAGNOSIS — D61.818 OTHER PANCYTOPENIA: ICD-10-CM

## 2025-04-21 DIAGNOSIS — Z86.59 PERSONAL HISTORY OF OTHER MENTAL AND BEHAVIORAL DISORDERS: ICD-10-CM

## 2025-04-21 LAB
ALBUMIN SERPL ELPH-MCNC: 2.9 G/DL — LOW (ref 3.3–5)
ALP SERPL-CCNC: 88 U/L — SIGNIFICANT CHANGE UP (ref 40–120)
ALT FLD-CCNC: 147 U/L — HIGH (ref 12–78)
ANION GAP SERPL CALC-SCNC: 5 MMOL/L — SIGNIFICANT CHANGE UP (ref 5–17)
AST SERPL-CCNC: 234 U/L — HIGH (ref 15–37)
BILIRUB SERPL-MCNC: 1.7 MG/DL — HIGH (ref 0.2–1.2)
BUN SERPL-MCNC: 7 MG/DL — SIGNIFICANT CHANGE UP (ref 7–23)
CALCIUM SERPL-MCNC: 8.4 MG/DL — LOW (ref 8.5–10.1)
CHLORIDE SERPL-SCNC: 107 MMOL/L — SIGNIFICANT CHANGE UP (ref 96–108)
CO2 SERPL-SCNC: 27 MMOL/L — SIGNIFICANT CHANGE UP (ref 22–31)
CREAT SERPL-MCNC: 1 MG/DL — SIGNIFICANT CHANGE UP (ref 0.5–1.3)
EGFR: 87 ML/MIN/1.73M2 — SIGNIFICANT CHANGE UP
EGFR: 87 ML/MIN/1.73M2 — SIGNIFICANT CHANGE UP
GLUCOSE SERPL-MCNC: 97 MG/DL — SIGNIFICANT CHANGE UP (ref 70–99)
HCT VFR BLD CALC: 30.2 % — LOW (ref 39–50)
HGB BLD-MCNC: 9.1 G/DL — LOW (ref 13–17)
INR BLD: 1.29 RATIO — HIGH (ref 0.85–1.16)
MCHC RBC-ENTMCNC: 21.2 PG — LOW (ref 27–34)
MCHC RBC-ENTMCNC: 30.1 G/DL — LOW (ref 32–36)
MCV RBC AUTO: 70.4 FL — LOW (ref 80–100)
MELD SCORE WITH DIALYSIS: 25 POINTS — SIGNIFICANT CHANGE UP
MELD SCORE WITHOUT DIALYSIS: 11 POINTS — SIGNIFICANT CHANGE UP
NRBC BLD AUTO-RTO: 1 /100 WBCS — HIGH (ref 0–0)
PLATELET # BLD AUTO: 115 K/UL — LOW (ref 150–400)
POTASSIUM SERPL-MCNC: 3.3 MMOL/L — LOW (ref 3.5–5.3)
POTASSIUM SERPL-SCNC: 3.3 MMOL/L — LOW (ref 3.5–5.3)
PROT SERPL-MCNC: 7 GM/DL — SIGNIFICANT CHANGE UP (ref 6–8.3)
PROTHROM AB SERPL-ACNC: 14.9 SEC — HIGH (ref 9.9–13.4)
RBC # BLD: 4.29 M/UL — SIGNIFICANT CHANGE UP (ref 4.2–5.8)
RBC # FLD: 19.8 % — HIGH (ref 10.3–14.5)
SODIUM SERPL-SCNC: 139 MMOL/L — SIGNIFICANT CHANGE UP (ref 135–145)
WBC # BLD: 1.78 K/UL — LOW (ref 3.8–10.5)
WBC # FLD AUTO: 1.78 K/UL — LOW (ref 3.8–10.5)

## 2025-04-21 PROCEDURE — 99222 1ST HOSP IP/OBS MODERATE 55: CPT

## 2025-04-21 PROCEDURE — 99233 SBSQ HOSP IP/OBS HIGH 50: CPT

## 2025-04-21 RX ADMIN — Medication 4 MILLIGRAM(S): at 19:13

## 2025-04-21 RX ADMIN — Medication 400 MILLIGRAM(S): at 11:50

## 2025-04-21 RX ADMIN — Medication 2 MILLIGRAM(S): at 13:08

## 2025-04-21 RX ADMIN — Medication 40 MILLIEQUIVALENT(S): at 18:05

## 2025-04-21 RX ADMIN — Medication 1 TABLET(S): at 13:05

## 2025-04-21 RX ADMIN — LACTULOSE 30 GRAM(S): 10 SOLUTION ORAL at 21:23

## 2025-04-21 RX ADMIN — Medication 400 MILLIGRAM(S): at 18:04

## 2025-04-21 RX ADMIN — Medication 4 GRAM(S): at 13:04

## 2025-04-21 RX ADMIN — Medication 4 GRAM(S): at 21:22

## 2025-04-21 RX ADMIN — DIAZEPAM 10 MILLIGRAM(S): 2 TABLET ORAL at 20:15

## 2025-04-21 RX ADMIN — FOLIC ACID 1 MILLIGRAM(S): 1 TABLET ORAL at 13:04

## 2025-04-21 RX ADMIN — Medication 40 MILLIEQUIVALENT(S): at 21:22

## 2025-04-21 RX ADMIN — LACTULOSE 30 GRAM(S): 10 SOLUTION ORAL at 05:38

## 2025-04-21 RX ADMIN — Medication 2 MILLIGRAM(S): at 14:00

## 2025-04-21 RX ADMIN — Medication 4 MILLIGRAM(S): at 18:05

## 2025-04-21 RX ADMIN — DIAZEPAM 10 MILLIGRAM(S): 2 TABLET ORAL at 05:48

## 2025-04-21 RX ADMIN — Medication 40 MILLIGRAM(S): at 05:40

## 2025-04-21 RX ADMIN — Medication 100 MILLIGRAM(S): at 14:19

## 2025-04-21 RX ADMIN — Medication 4 GRAM(S): at 05:39

## 2025-04-21 RX ADMIN — LACTULOSE 30 GRAM(S): 10 SOLUTION ORAL at 13:06

## 2025-04-21 NOTE — BH CONSULTATION LIAISON ASSESSMENT NOTE - NSBHCONSULTRECOMMENDOTHER_PSY_A_CORE FT
- continue symptom triggered CIWA  - PATIENT DID NOT PRESENT WITH ALCOHOL WITHDRAWAL BUT WITH ACUTE INTOXICATION WITH BAL . Suspecting AMS and low RR on presentation was due to potentiated effect of high blood alcohol level which Patient can no longer handle as he officially has liver failure with recent MELD score of 26.   - overall limited prognosis given hepatic failure with continued heavy alcohol consumption  - continue symptom triggered CIWA  - PATIENT DID NOT PRESENT WITH ALCOHOL WITHDRAWAL BUT WITH ACUTE INTOXICATION WITH BAL . Suspecting AMS and low RR on presentation was due to potentiated effect of high blood alcohol level which Patient can no longer handle as he officially has liver failure with recent MELD score of 26.   - overall limited prognosis given hepatic failure with continued heavy alcohol consumption

## 2025-04-21 NOTE — BH CONSULTATION LIAISON ASSESSMENT NOTE - VIOLENCE PROTECTIVE FACTORS:
Residential stability/Relationship stability/Employment stability
Residential stability/Relationship stability/Employment stability

## 2025-04-21 NOTE — BH CONSULTATION LIAISON ASSESSMENT NOTE - SUMMARY
Patient well known to  services - 59yo M with decades of heavy alcohol use disorder with physiological dependence, hx of withdrawals needing supervised medical withdrawal management, with lifelong low motivation to achieve sobriety/abstinence. Patient subsequently developed serious chronic medical sequela, including most recent diagnosis of liver failure, resulting in frequent ED presentations and medical admissions. Patient again overmedicated with prolonged CIWA protocol which should have been done in 5 days and with use of ONE agent.  Patient well known to  services - 61yo M with decades of heavy alcohol use disorder with physiological dependence, hx of withdrawals needing supervised medical withdrawal management, with lifelong low motivation to achieve sobriety/abstinence. Patient subsequently developed serious chronic medical sequela, including most recent diagnosis of liver failure, resulting in frequent ED presentations and medical admissions. Currently adequately controlled with CIWA/symptom-triggered meds.

## 2025-04-21 NOTE — BH CONSULTATION LIAISON ASSESSMENT NOTE - RISK ASSESSMENT
Chronic risk factors: single, male gender, continuous/chronic heavy alcohol abuse with limited insight and lack of motivation to address it. Protective factors: young; no formal psych hx; no known hx of suicide attempts or self-injurious behavior; no hx of aggression/violence; stable domicile, engaged with his job; denies illicit drug use; denies access to guns; access to health services. No acute risk factors identified. Chronic risk factors: single, male gender, continuous/chronic heavy alcohol abuse with limited insight and lack of motivation to address it. Protective factors: young; no formal psych hx; no known hx of suicide attempts or self-injurious behavior; no hx of aggression/violence; stable domicile, engaged with his job; denies illicit drug use; denies access to guns; access to health services. No acute risk factors identified - baseline

## 2025-04-21 NOTE — PROGRESS NOTE ADULT - ASSESSMENT
Stefan Degroot is a 60 year old male with PMHx EtOH abuse (c/b prior withdrawals with delirium tremors and previously requiring intubation due to hypoxia), cirrhosis, and PUD who presented to the ED on 4/19/25 for complaints of alcohol intoxication and admitted for alcohol intoxication with withdrawal.    Alcohol intoxication with withdrawal  Unable to obtain history regarding how much patient typically drinks  Blood alcohol level 342 on admission  VBG 7.38 / 49 / 36 / 29, acetaminophen level negative, COVID/influenza/RSV negative  S/p 1L NS bolus and diazepam 10 mg IV in the ED  CIWA protocol, diazepam PRN, unable to order ativan as per ICU recs due to shortage  Unable to order phenobarbital due to transaminitis  Seizure and aspiration precautions  PTA thiamine, folic acid, multivitamins  Telemetry  ICU recommendations appreciated    Transaminitis, suspect secondary to EtOH abuse   and  on admission  R factor 6.6 which suggests hepatocellular injury  Avoid hepatotoxic agents  Monitor LFTs    Suicidal ideation  As per ER nurse manager, patient tried to grab scissors from a nurse in order to kill himself "to end it all"  Continue 1:1 sitter for safety  psych appreciated   - denies current SI and HI    Chronic medical conditions:   Cirrhosis: PTA rifaximin 550 mg BID, lactulose q8  Hx of PUD: PTA pantoprazole DR 40 mg  Microcytic/normocytic anemia, chronic: hgb 10.6 on admission, appears around baseline, no signs of active bleeding

## 2025-04-21 NOTE — BH CONSULTATION LIAISON ASSESSMENT NOTE - NSBHATTESTBILLING_PSY_A_CORE
99222-Initial OBS or IP - moderate complexity OR 55-74 mins
75794-Iwvjjphmojv diagnostic evaluation with medical services

## 2025-04-21 NOTE — BH CONSULTATION LIAISON ASSESSMENT NOTE - NSBHCHARTREVIEWVS_PSY_A_CORE FT
Vital Signs Last 24 Hrs  T(C): 36.9 (20 Apr 2025 16:13), Max: 37.1 (20 Apr 2025 10:31)  T(F): 98.4 (20 Apr 2025 16:13), Max: 98.7 (20 Apr 2025 10:31)  HR: 96 (20 Apr 2025 16:13) (89 - 101)  BP: 116/81 (20 Apr 2025 16:13) (108/57 - 145/91)  BP(mean): 108 (19 Apr 2025 23:12) (108 - 108)  RR: 16 (20 Apr 2025 16:13) (16 - 17)  SpO2: 97% (20 Apr 2025 10:31) (95% - 97%)    Parameters below as of 20 Apr 2025 16:13    O2 Flow (L/min): 97  
Vital Signs Last 24 Hrs  T(C): 36.8 (21 Apr 2025 10:48), Max: 36.9 (20 Apr 2025 16:13)  T(F): 98.2 (21 Apr 2025 10:48), Max: 98.4 (20 Apr 2025 16:13)  HR: 94 (21 Apr 2025 10:48) (69 - 96)  BP: 123/80 (21 Apr 2025 10:48) (116/81 - 149/86)  BP(mean): --  RR: 18 (21 Apr 2025 10:48) (16 - 18)  SpO2: 98% (21 Apr 2025 10:48) (96% - 98%)    Parameters below as of 21 Apr 2025 04:59  Patient On (Oxygen Delivery Method): room air

## 2025-04-21 NOTE — BH CONSULTATION LIAISON ASSESSMENT NOTE - HPI (INCLUDE ILLNESS QUALITY, SEVERITY, DURATION, TIMING, CONTEXT, MODIFYING FACTORS, ASSOCIATED SIGNS AND SYMPTOMS)
PATIENT KNOWN TO WRITER FROM PRIOR ADMISSIONS; COMPLEX CARE CASE: 61 yo [place]  [place] male, lives with his son in a private home, works as a , with long history of continuous Alcohol Dependence/Severe Alcohol Use Disorder spanning decades (at most reports 2-3 bottles of vodka 1-2/day; has cute down on amount and frequency in last few years), with associated numerous ED visits and hospital admissions (ie. epigastric pain, vomiting, nausea, hematemesis, aspiration pneumonia, esophageal ulcer, UGI bleed, Jacque Henning tears, chronic anemia, pancytopenia, MSSA aspiration PNA, ICU admissions including [facility] Fall 24' prolonged hospitalization for hypoxic respiratory failure requiring intubation due to cerebral edema and AMS (s/p Plex x3, mannitol and hypertonic saline), has had BALs > 400 before.  Patient seen by Hepatology at [facility] 10/24 for decompensated alcohol cirrhosis, MELD 26 with ascites: s/p paracentesis 10/1 with 800cc fluid removal, SAAG > 1.1. Patient with hx of myriad of ED presentations usually for GI complaints and usually in state of alcohol intoxication, gets frequently medically admitted who returns 4/20/25 and triaged as " brought by ems as altered mental status and shallow breathing .Initial saturation was 90 and ems was  bagging the patient as they arrived." Seen by ICU in ED - did not need ICU admission at the time. As per ICU consult note, Pt said "Patient states he "does not want to do this anymore, wants to die here"."  . On symptom triggered CIWA. Used diazepam 10mg IVP x 6 doses in last 24 hrs      PATIENT KNOWN TO WRITER FROM PRIOR ADMISSIONS; COMPLEX CARE CASE: 61 yo [place]   male, lives with his son in a private home, works as a , with long history of continuous Alcohol Dependence/Severe Alcohol Use Disorder spanning decades (at most reports 2-3 bottles of vodka 1-2/day; has cute down on amount and frequency in last few years), with associated numerous ED visits and hospital admissions (ie. epigastric pain, vomiting, nausea, hematemesis, aspiration pneumonia, esophageal ulcer, UGI bleed, Jacque Henning tears, chronic anemia, pancytopenia, MSSA aspiration PNA, ICU admissions including [facility] Fall 24' prolonged hospitalization for hypoxic respiratory failure requiring intubation due to cerebral edema and AMS (s/p Plex x3, mannitol and hypertonic saline), has had BALs > 400 before.  Patient seen by Hepatology at [facility] 10/24 for decompensated alcohol cirrhosis, MELD 26 with ascites: s/p paracentesis 10/1 with 800cc fluid removal, SAAG > 1.1. Patient with hx of myriad of ED presentations usually for GI complaints and usually in state of alcohol intoxication, gets frequently medically admitted who returns 4/20/25 and triaged as " brought by ems as altered mental status and shallow breathing .Initial saturation was 90 and ems was  bagging the patient as they arrived." Seen by ICU in ED - did not need ICU admission at the time. As per ICU consult note, Pt said "Patient states he "does not want to do this anymore, wants to die here"."  . On symptom triggered CIWA. Used diazepam 10mg IVP x 6 doses in last 24 hrs.     EXAM: awake, alert, oriented x 4 (needs reminder of HPI as he cannot recall which is expected given intox state). Patient recognizes Writer, reports that he has been diagnosed with liver problem and goes to the clinic at [facility] regularly for blood draw (hepatology clinic). Patient asked if he was told the cause of the liver issue (ie alcohol use) which he said he was not and his liver issue is not due to his drinking as other people who don't drink have liver issues as well. Again greatly minimizes the extend of his alcohol use throughout the years and ongoing use, and reports hat he has been doing same otherwise. 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 Patient said he made the suicidal-themed statement as he was in physical distress due to overall aches/pains and he felt like no one listened to him in the emergency room and he was not given anything for pain. (current medication helps "a little"). Patient said that his statement was not because he wanted to kill himself - it was made out of frustration and also along the lines of life not worth living if one is suffering. He feels better now. Denies and does not manifest any symptoms of hypomania/alma/psychosis/major depression/ anxiety/panic. Denies any active or passive suicidal or homicidal ideation. Names protective factors (uma; family; has future plans; hope for future). Denies illicit substance use and denies access to guns.  No current physical manifestation of acute alcohol withdrawal.

## 2025-04-21 NOTE — BH CONSULTATION LIAISON ASSESSMENT NOTE - OTHER
returning to baseline  has reactivity  continues to be limited into the extent of his alcohol addiction  adequate  appropriate  low end of fair  deferred

## 2025-04-21 NOTE — BH CONSULTATION LIAISON ASSESSMENT NOTE - NSSUICRSKFACTOR_PSY_ALL_CORE
Current and Past Psychiatric Diagnoses Current and Past Psychiatric Diagnoses/Treatment Related Factors/Activating Events/Stressors

## 2025-04-21 NOTE — BH CONSULTATION LIAISON ASSESSMENT NOTE - NSICDXPASTSURGICALHX_GEN_ALL_CORE_FT
PAST SURGICAL HISTORY:  No significant past surgical history
PAST SURGICAL HISTORY:  No significant past surgical history

## 2025-04-21 NOTE — BH CONSULTATION LIAISON ASSESSMENT NOTE - CURRENT MEDICATION
MEDICATIONS  (STANDING):  cholestyramine Powder (Sugar-Free) 4 Gram(s) Oral three times a day  folic acid 1 milliGRAM(s) Oral daily  lactulose Syrup 30 Gram(s) Oral every 8 hours  magnesium oxide 400 milliGRAM(s) Oral two times a day with meals  multivitamin 1 Tablet(s) Oral daily  pantoprazole    Tablet 40 milliGRAM(s) Oral before breakfast  polyethylene glycol 3350 17 Gram(s) Oral daily  rifAXIMin 550 milliGRAM(s) Oral two times a day  thiamine 100 milliGRAM(s) Oral daily    MEDICATIONS  (PRN):  acetaminophen     Tablet .. 650 milliGRAM(s) Oral every 6 hours PRN Temp greater or equal to 38C (100.4F), Mild Pain (1 - 3)  aluminum hydroxide/magnesium hydroxide/simethicone Suspension 30 milliLiter(s) Oral every 4 hours PRN Dyspepsia  diazepam  Injectable 10 milliGRAM(s) IV Push every 1 hour PRN CIWA 8-14  melatonin 3 milliGRAM(s) Oral at bedtime PRN Insomnia  morphine  - Injectable 2 milliGRAM(s) IV Push every 6 hours PRN Severe Pain (7 - 10)  ondansetron Injectable 4 milliGRAM(s) IV Push every 8 hours PRN Nausea and/or Vomiting  
MEDICATIONS  (STANDING):  cholestyramine Powder (Sugar-Free) 4 Gram(s) Oral three times a day  folic acid 1 milliGRAM(s) Oral daily  influenza   Vaccine 0.5 milliLiter(s) IntraMuscular once  lactulose Syrup 30 Gram(s) Oral every 8 hours  magnesium oxide 400 milliGRAM(s) Oral two times a day with meals  multivitamin 1 Tablet(s) Oral daily  pantoprazole    Tablet 40 milliGRAM(s) Oral before breakfast  polyethylene glycol 3350 17 Gram(s) Oral daily  rifAXIMin 550 milliGRAM(s) Oral two times a day  thiamine 100 milliGRAM(s) Oral daily    MEDICATIONS  (PRN):  acetaminophen     Tablet .. 650 milliGRAM(s) Oral every 6 hours PRN Temp greater or equal to 38C (100.4F), Mild Pain (1 - 3)  aluminum hydroxide/magnesium hydroxide/simethicone Suspension 30 milliLiter(s) Oral every 4 hours PRN Dyspepsia  diazepam  Injectable 10 milliGRAM(s) IV Push every 1 hour PRN CIWA 8-14  melatonin 3 milliGRAM(s) Oral at bedtime PRN Insomnia  morphine  - Injectable 2 milliGRAM(s) IV Push every 6 hours PRN Severe Pain (7 - 10)  ondansetron Injectable 4 milliGRAM(s) IV Push every 8 hours PRN Nausea and/or Vomiting

## 2025-04-21 NOTE — BH CONSULTATION LIAISON ASSESSMENT NOTE - NSICDXPASTMEDICALHX_GEN_ALL_CORE_FT
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     
PAST MEDICAL HISTORY:  Elevated lipase     EtOH dependence     Gastritis     PUD (peptic ulcer disease)

## 2025-04-21 NOTE — PROGRESS NOTE ADULT - TIME BILLING
The necessity of the time spent during the encounter on this date of service was due to:   - Ordering, reviewing, and interpreting labs, testing, and imaging.  - Independently obtaining a review of systems and performing a physical exam  - Reviewing prior hospitalization and where necessary, outpatient records.  - Reviewing consultant recommendations/communicating with consultants  - Counselling and educating patient and family regarding interpretation of aforementioned items and plan of care.    Time-based billing (NON-critical care). Total minutes spent: 52

## 2025-04-21 NOTE — BH CONSULTATION LIAISON ASSESSMENT NOTE - NSSUICPROTFACT_PSY_ALL_CORE
Responsibility to children, family, or others/Identifies reasons for living/Supportive social network of family or friends Responsibility to children, family, or others/Identifies reasons for living/Supportive social network of family or friends/Engaged in work or school/Judaism beliefs

## 2025-04-21 NOTE — PROGRESS NOTE ADULT - SUBJECTIVE AND OBJECTIVE BOX
HPI:  Stefan Degroot is a 60 year old male with PMHx EtOH abuse (c/b prior withdrawals with delirium tremors and previously requiring intubation due to hypoxia), cirrhosis, and PUD who presented to the ED on 4/19/25 for complaints of alcohol intoxication.    History primarily obtained from ER physician documentation as patient is unable to provide history at this time due to intoxication. As per ER triage RN, patient was brought in by EMS for altered mental status and shallow breathing. EMS was bagging patient upon arrival. As per ER nurse manager, patient tried to grab scissors from a nurse in order to kill himself "to end it all." Placed on 1:1 for constant observation for suicidal ideation while intoxicated.    In the ED, afebrile, HR as elevated as 114, BP as elevated as 144/128, RR as elevated as 22. WBC 2.60K, hgb 10.6, , . VBG 7.38 / 49 / 36 / 29. Acetaminophen level negative. Blood alcohol level 342. COVID/influenza/RSV negative. Received 1L NS bolus and diazepam 10 mg IV. Evaluated by ICU who states no indication for ICU level of care at this time.  (19 Apr 2025 23:57)      PAST MEDICAL & SURGICAL HISTORY:  PUD (peptic ulcer disease)      EtOH dependence      History of cirrhosis of liver      Jacque-Henning tear      Alcoholic hepatic failure without coma      Pancytopenia      Non megaloblastic anemia due to alcoholism      History of hematemesis      History of aspiration pneumonia      History of alcohol withdrawal delirium      Alcohol use disorder, severe, dependence      No significant past surgical history          REVIEW OF SYSTEMS:    no complaints somnolent       MEDICATIONS  (STANDING):  cholestyramine Powder (Sugar-Free) 4 Gram(s) Oral three times a day  folic acid 1 milliGRAM(s) Oral daily  lactulose Syrup 30 Gram(s) Oral every 8 hours  magnesium oxide 400 milliGRAM(s) Oral two times a day with meals  multivitamin 1 Tablet(s) Oral daily  pantoprazole    Tablet 40 milliGRAM(s) Oral before breakfast  polyethylene glycol 3350 17 Gram(s) Oral daily  rifAXIMin 550 milliGRAM(s) Oral two times a day  thiamine 100 milliGRAM(s) Oral daily    MEDICATIONS  (PRN):  acetaminophen     Tablet .. 650 milliGRAM(s) Oral every 6 hours PRN Temp greater or equal to 38C (100.4F), Mild Pain (1 - 3)  aluminum hydroxide/magnesium hydroxide/simethicone Suspension 30 milliLiter(s) Oral every 4 hours PRN Dyspepsia  diazepam  Injectable 10 milliGRAM(s) IV Push every 1 hour PRN CIWA 8-14  melatonin 3 milliGRAM(s) Oral at bedtime PRN Insomnia  morphine  - Injectable 4 milliGRAM(s) IV Push every 4 hours PRN Severe Pain (7 - 10)  ondansetron Injectable 4 milliGRAM(s) IV Push every 8 hours PRN Nausea and/or Vomiting      Allergies    No Known Allergies    Intolerances        SOCIAL HISTORY:    FAMILY HISTORY:  FH: HTN (hypertension)        Vital Signs Last 24 Hrs  T(C): 36.9 (21 Apr 2025 15:55), Max: 36.9 (21 Apr 2025 15:55)  T(F): 98.5 (21 Apr 2025 15:55), Max: 98.5 (21 Apr 2025 15:55)  HR: 84 (21 Apr 2025 15:55) (69 - 96)  BP: 131/84 (21 Apr 2025 15:55) (123/80 - 149/86)  BP(mean): --  RR: 18 (21 Apr 2025 15:55) (18 - 18)  SpO2: 98% (21 Apr 2025 15:55) (96% - 98%)    Parameters below as of 21 Apr 2025 15:55  Patient On (Oxygen Delivery Method): room air        PHYSICAL EXAM:    GENERAL: NADHEAD:  Atraumatic, Normocephalic  EYES: EOMI, PERRLA, conjunctiva and sclera clear  ENMT: No tonsillar erythema, exudates, or enlargement; Moist mucous membranes, Good dentition, No lesions  NECK: Supple, No JVD, Normal thyroid  NERVOUS SYSTEM:  Awake but sleepy   CHEST/LUNG: Clear to percussion bilaterally; No rales, rhonchi, wheezing, or rubs  HEART: Regular rate and rhythm; No murmurs, rubs, or gallops  ABDOMEN: Soft, Nontender, Nondistended; Bowel sounds present  EXTREMITIES:  2+ Peripheral Pulses, No clubbing, cyanosis, or edema    SKIN: No rashes or lesions      LABS:                        9.1    1.78  )-----------( 115      ( 21 Apr 2025 06:20 )             30.2     04-21    139  |  107  |  7   ----------------------------<  97  3.3[L]   |  27  |  1.00    Ca    8.4[L]      21 Apr 2025 06:20  Mg     1.9     04-19    TPro  7.0  /  Alb  2.9[L]  /  TBili  1.7[H]  /  DBili  x   /  AST  234[H]  /  ALT  147[H]  /  AlkPhos  88  04-21    PT/INR - ( 21 Apr 2025 06:20 )   PT: 14.9 sec;   INR: 1.29 ratio         PTT - ( 19 Apr 2025 17:43 )  PTT:33.0 sec  Urinalysis Basic - ( 21 Apr 2025 06:20 )    Color: x / Appearance: x / SG: x / pH: x  Gluc: 97 mg/dL / Ketone: x  / Bili: x / Urobili: x   Blood: x / Protein: x / Nitrite: x   Leuk Esterase: x / RBC: x / WBC x   Sq Epi: x / Non Sq Epi: x / Bacteria: x        RADIOLOGY & ADDITIONAL STUDIES:

## 2025-04-22 ENCOUNTER — TRANSCRIPTION ENCOUNTER (OUTPATIENT)
Age: 60
End: 2025-04-22

## 2025-04-22 VITALS
DIASTOLIC BLOOD PRESSURE: 79 MMHG | TEMPERATURE: 98 F | HEART RATE: 98 BPM | SYSTOLIC BLOOD PRESSURE: 118 MMHG | OXYGEN SATURATION: 98 % | RESPIRATION RATE: 19 BRPM

## 2025-04-22 LAB
ALBUMIN SERPL ELPH-MCNC: 2.9 G/DL — LOW (ref 3.3–5)
ALP SERPL-CCNC: 92 U/L — SIGNIFICANT CHANGE UP (ref 40–120)
ALT FLD-CCNC: 123 U/L — HIGH (ref 12–78)
ANION GAP SERPL CALC-SCNC: 4 MMOL/L — LOW (ref 5–17)
AST SERPL-CCNC: 150 U/L — HIGH (ref 15–37)
BILIRUB SERPL-MCNC: 1 MG/DL — SIGNIFICANT CHANGE UP (ref 0.2–1.2)
BUN SERPL-MCNC: 6 MG/DL — LOW (ref 7–23)
CALCIUM SERPL-MCNC: 9.1 MG/DL — SIGNIFICANT CHANGE UP (ref 8.5–10.1)
CHLORIDE SERPL-SCNC: 112 MMOL/L — HIGH (ref 96–108)
CO2 SERPL-SCNC: 25 MMOL/L — SIGNIFICANT CHANGE UP (ref 22–31)
CREAT SERPL-MCNC: 1.09 MG/DL — SIGNIFICANT CHANGE UP (ref 0.5–1.3)
EGFR: 79 ML/MIN/1.73M2 — SIGNIFICANT CHANGE UP
EGFR: 79 ML/MIN/1.73M2 — SIGNIFICANT CHANGE UP
GLUCOSE SERPL-MCNC: 103 MG/DL — HIGH (ref 70–99)
HCT VFR BLD CALC: 31 % — LOW (ref 39–50)
HGB BLD-MCNC: 9.4 G/DL — LOW (ref 13–17)
MAGNESIUM SERPL-MCNC: 1.7 MG/DL — SIGNIFICANT CHANGE UP (ref 1.6–2.6)
MCHC RBC-ENTMCNC: 21.4 PG — LOW (ref 27–34)
MCHC RBC-ENTMCNC: 30.3 G/DL — LOW (ref 32–36)
MCV RBC AUTO: 70.6 FL — LOW (ref 80–100)
NRBC BLD AUTO-RTO: 1 /100 WBCS — HIGH (ref 0–0)
PHOSPHATE SERPL-MCNC: 2.5 MG/DL — SIGNIFICANT CHANGE UP (ref 2.5–4.5)
PLATELET # BLD AUTO: 131 K/UL — LOW (ref 150–400)
POTASSIUM SERPL-MCNC: 4.4 MMOL/L — SIGNIFICANT CHANGE UP (ref 3.5–5.3)
POTASSIUM SERPL-SCNC: 4.4 MMOL/L — SIGNIFICANT CHANGE UP (ref 3.5–5.3)
PROT SERPL-MCNC: 7.3 GM/DL — SIGNIFICANT CHANGE UP (ref 6–8.3)
RBC # BLD: 4.39 M/UL — SIGNIFICANT CHANGE UP (ref 4.2–5.8)
RBC # FLD: 19.9 % — HIGH (ref 10.3–14.5)
SODIUM SERPL-SCNC: 141 MMOL/L — SIGNIFICANT CHANGE UP (ref 135–145)
WBC # BLD: 2.19 K/UL — LOW (ref 3.8–10.5)
WBC # FLD AUTO: 2.19 K/UL — LOW (ref 3.8–10.5)

## 2025-04-22 PROCEDURE — 99232 SBSQ HOSP IP/OBS MODERATE 35: CPT

## 2025-04-22 PROCEDURE — 99239 HOSP IP/OBS DSCHRG MGMT >30: CPT

## 2025-04-22 RX ORDER — MAGNESIUM OXIDE 400 MG
1 TABLET ORAL
Qty: 0 | Refills: 0 | DISCHARGE
Start: 2025-04-22

## 2025-04-22 RX ORDER — CHLORDIAZEPOXIDE HCL 10 MG
25 CAPSULE ORAL THREE TIMES A DAY
Refills: 0 | Status: DISCONTINUED | OUTPATIENT
Start: 2025-04-22 | End: 2025-04-22

## 2025-04-22 RX ORDER — RIFAXIMIN 550 MG/1
1 TABLET ORAL
Qty: 0 | Refills: 0 | DISCHARGE
Start: 2025-04-22

## 2025-04-22 RX ORDER — DIAZEPAM 2 MG/1
10 TABLET ORAL EVERY 4 HOURS
Refills: 0 | Status: DISCONTINUED | OUTPATIENT
Start: 2025-04-22 | End: 2025-04-22

## 2025-04-22 RX ADMIN — Medication 40 MILLIEQUIVALENT(S): at 01:51

## 2025-04-22 RX ADMIN — FOLIC ACID 1 MILLIGRAM(S): 1 TABLET ORAL at 11:57

## 2025-04-22 RX ADMIN — Medication 400 MILLIGRAM(S): at 07:47

## 2025-04-22 RX ADMIN — Medication 40 MILLIGRAM(S): at 05:04

## 2025-04-22 RX ADMIN — Medication 100 MILLIGRAM(S): at 11:58

## 2025-04-22 RX ADMIN — LACTULOSE 30 GRAM(S): 10 SOLUTION ORAL at 05:05

## 2025-04-22 RX ADMIN — Medication 1 TABLET(S): at 11:57

## 2025-04-22 RX ADMIN — DIAZEPAM 10 MILLIGRAM(S): 2 TABLET ORAL at 00:39

## 2025-04-22 RX ADMIN — Medication 4 GRAM(S): at 05:03

## 2025-04-22 RX ADMIN — Medication 4 GRAM(S): at 15:01

## 2025-04-22 RX ADMIN — DIAZEPAM 10 MILLIGRAM(S): 2 TABLET ORAL at 05:37

## 2025-04-22 NOTE — BH CONSULTATION LIAISON PROGRESS NOTE - OTHER
returning to baseline  continues to be limited into the extent of his alcohol addiction  deferred  adequate  has reactivity  low end of fair  appropriate

## 2025-04-22 NOTE — BH CONSULTATION LIAISON PROGRESS NOTE - NSBHCHARTREVIEWLAB_PSY_A_CORE FT
04-22    141  |  112[H]  |  6[L]  ----------------------------<  103[H]  4.4   |  25  |  1.09    Ca    9.1      22 Apr 2025 06:30  Phos  2.5     04-22  Mg     1.7     04-22    TPro  7.3  /  Alb  2.9[L]  /  TBili  1.0  /  DBili  x   /  AST  150[H]  /  ALT  123[H]  /  AlkPhos  92  04-22

## 2025-04-22 NOTE — BH CONSULTATION LIAISON PROGRESS NOTE - NSBHCONSULTRECOMMENDOTHER_PSY_A_CORE FT
4/21/25: continue symptom triggered CIWA  - PATIENT DID NOT PRESENT WITH ALCOHOL WITHDRAWAL BUT WITH ACUTE INTOXICATION WITH BAL . Suspecting AMS and low RR on presentation was due to potentiated effect of high blood alcohol level which Patient can no longer handle as he officially has liver failure with recent MELD score of 26.   - overall limited prognosis given hepatic failure with continued heavy alcohol consumption   4/22/25: suspecting overuse of IVP Valium as per its trend (mostly overnight) ergo q1hr PRN frequency of valium IVP PRN increased to q4hes and Librium 25mg PO TID started (hepatic dosing) to ensure Pt is not overmedicated   - DC CO 1;1 (no agitation daytime and behaves apporpirately)   - no acute severe alcohol withdrawal symptoms; mild at best  - Patient with continues poor insight into having alcohol addiction and has again declined any attempts of referral to addiction services; Pt also does not think his liver failure is alcohol related (extensive psychoeducation again done)    4/21/25: continue symptom triggered CIWA  - PATIENT DID NOT PRESENT WITH ALCOHOL WITHDRAWAL BUT WITH ACUTE INTOXICATION WITH BAL . Suspecting AMS and low RR on presentation was due to potentiated effect of high blood alcohol level which Patient can no longer handle as he officially has liver failure with recent MELD score of 26.   - overall limited prognosis given hepatic failure with continued heavy alcohol consumption   4/22/25: suspecting overuse of IVP Valium as per its trend (mostly overnight). adjusted PRN frequency and ordered instead standing PO Librium   - DC CO 1;1 (no agitation daytime and behaves appropriately)   - no acute severe alcohol withdrawal symptoms; mild at best  - Patient with continues poor insight into having alcohol addiction and has again declined any attempts of referral to addiction services; Pt also does not think his liver failure is alcohol related (extensive psychoeducation again done)   - able to leave AMA at this time / make his medical decisions

## 2025-04-22 NOTE — BH CONSULTATION LIAISON PROGRESS NOTE - NSBHCHARTREVIEWVS_PSY_A_CORE FT
Vital Signs Last 24 Hrs  T(C): 36.6 (22 Apr 2025 10:44), Max: 36.9 (21 Apr 2025 15:55)  T(F): 97.8 (22 Apr 2025 10:44), Max: 98.5 (21 Apr 2025 15:55)  HR: 98 (22 Apr 2025 10:44) (80 - 98)  BP: 118/79 (22 Apr 2025 10:44) (118/79 - 135/84)  BP(mean): --  RR: 19 (22 Apr 2025 10:44) (18 - 19)  SpO2: 98% (22 Apr 2025 10:44) (97% - 98%)    Parameters below as of 22 Apr 2025 10:44  Patient On (Oxygen Delivery Method): room air

## 2025-04-22 NOTE — DISCHARGE NOTE PROVIDER - ATTENDING DISCHARGE PHYSICAL EXAMINATION:
T(C): 36.6 (04-22-25 @ 10:44), Max: 36.9 (04-21-25 @ 15:55)  HR: 98 (04-22-25 @ 10:44) (80 - 98)  BP: 118/79 (04-22-25 @ 10:44) (118/79 - 135/84)  RR: 19 (04-22-25 @ 10:44) (18 - 19)  SpO2: 98% (04-22-25 @ 10:44) (97% - 98%)    CONSTITUTIONAL: Well groomed, no apparent distress  EYES: PERRLA and symmetric, EOMI, No conjunctival or scleral injection, non-icteric  ENMT: Oral mucosa with moist membranes. Normal dentition; no pharyngeal injection or exudates             NECK: Supple, symmetric and without tracheal deviation   RESP: No respiratory distress, no use of accessory muscles; CTA b/l, no WRR  CV: RRR, +S1S2, no MRG; no JVD; no peripheral edema  GI: Soft, NT, ND, no rebound, no guarding; no palpable masses; no hepatosplenomegaly; no hernia palpated  LYMPH: No cervical LAD or tenderness; no axillary LAD or tenderness; no inguinal LAD or tenderness  MSK: Normal gait; No digital clubbing or cyanosis; examination of the (head/neck/spine/ribs/pelvis, RUE, LUE, RLE, LLE) without misalignment,            Normal ROM without pain, no spinal tenderness, normal muscle strength/tone  SKIN: No rashes or ulcers noted; no subcutaneous nodules or induration palpable  NEURO: CN II-XII intact; normal reflexes in upper and lower extremities, sensation intact in upper and lower extremities b/l to light touch   PSYCH: Appropriate insight/judgment; A+O x 3, mood and affect appropriate, recent/remote memory intact

## 2025-04-22 NOTE — DISCHARGE NOTE NURSING/CASE MANAGEMENT/SOCIAL WORK - NSDCVIVACCINE_GEN_ALL_CORE_FT
COVID-19, mRNA, LNP-S, PF, 100 mcg/ 0.5 mL dose (Moderna); 10-Jovan-2021 15:38; Reji Hernandez (RN); [employer] [id number] (Exp. Date: 13-Jul-2021); IntraMuscular; Deltoid Right.; 0.5 milliLiter(s);   influenza, injectable, quadrivalent, preservative free; 31-Jan-2019 15:53; Ayaka Bynum (RN); [employer]; [id number] (Exp. Date: 30-Jun-2019); IntraMuscular; Deltoid Right.; 0.5 milliLiter(s); VIS (VIS Published: 07-Aug-2015, VIS Presented: 31-Jan-2019);   influenza, injectable, quadrivalent, preservative free; 10-Nov-2019 14:13; Laverne Lane (RN); [employer]; [id number] (Exp. Date: 30-Jun-2020); IntraMuscular; Deltoid Left.; 0.5 milliLiter(s); VIS (VIS Published: 15-Aug-2019, VIS Presented: 10-Nov-2019);   influenza, injectable, quadrivalent, preservative free; 13-Oct-2020 11:56; Kimberli Cronin (RN); [employer]; [id number] (Exp. Date: 30-Jun-2021); IntraMuscular; Deltoid Right.; 0.5 milliLiter(s); VIS (VIS Published: 15-Aug-2019, VIS Presented: 13-Oct-2020);   Pneumococcal conjugate vaccine 20-valent (PCV20), polysaccharide [id number] conjugate, adjuvant, preserv; 04-Oct-2024 21:44; Colby Mckeon (RN); [employer]; [id number] (Exp. Date: 04-May-2025); IntraMuscular; Deltoid Left.; 0.5 milliLiter(s); VIS (VIS Published: 12-May-2023, VIS Presented: 04-Oct-2024);   Td (adult) preservative free; 18-Jan-2020 20:04; Yulia Vance (RN); [employer]; [id number] (Exp. Date: 19-Jan-2021); IntraMuscular; Deltoid Right.; 0.5 milliLiter(s); VIS (VIS Published: 18-Jan-2020, VIS Presented: 18-Jan-2020);

## 2025-04-22 NOTE — BH CONSULTATION LIAISON PROGRESS NOTE - CURRENT MEDICATION
MEDICATIONS  (STANDING):  chlordiazePOXIDE 25 milliGRAM(s) Oral three times a day  cholestyramine Powder (Sugar-Free) 4 Gram(s) Oral three times a day  folic acid 1 milliGRAM(s) Oral daily  lactulose Syrup 30 Gram(s) Oral every 8 hours  magnesium oxide 400 milliGRAM(s) Oral two times a day with meals  multivitamin 1 Tablet(s) Oral daily  pantoprazole    Tablet 40 milliGRAM(s) Oral before breakfast  polyethylene glycol 3350 17 Gram(s) Oral daily  rifAXIMin 550 milliGRAM(s) Oral two times a day  thiamine 100 milliGRAM(s) Oral daily    MEDICATIONS  (PRN):  acetaminophen     Tablet .. 650 milliGRAM(s) Oral every 6 hours PRN Temp greater or equal to 38C (100.4F), Mild Pain (1 - 3)  aluminum hydroxide/magnesium hydroxide/simethicone Suspension 30 milliLiter(s) Oral every 4 hours PRN Dyspepsia  diazepam  Injectable 10 milliGRAM(s) IV Push every 4 hours PRN for CIWA score 13 or higher  melatonin 3 milliGRAM(s) Oral at bedtime PRN Insomnia  morphine  - Injectable 4 milliGRAM(s) IV Push every 4 hours PRN Severe Pain (7 - 10)  ondansetron Injectable 4 milliGRAM(s) IV Push every 8 hours PRN Nausea and/or Vomiting

## 2025-04-22 NOTE — DISCHARGE NOTE NURSING/CASE MANAGEMENT/SOCIAL WORK - FINANCIAL ASSISTANCE
[facility] provides services at a reduced cost to those who are determined to be eligible through [facility]’s financial assistance program. Information regarding [facility]’s financial assistance program can be found by going to [email or web address] or by calling [phone].

## 2025-04-22 NOTE — DISCHARGE NOTE NURSING/CASE MANAGEMENT/SOCIAL WORK - PATIENT PORTAL LINK FT
You can access the FollowMyHealth Patient Portal offered by [facility] by registering at the following website: [email or web address]. By joining [facility]’s FollowMyHealth portal, you will also be able to view your health information using other applications (apps) compatible with our system.

## 2025-04-22 NOTE — DISCHARGE NOTE NURSING/CASE MANAGEMENT/SOCIAL WORK - NSDCPEFALRISK_GEN_ALL_CORE
For information on Fall & Injury Prevention, visit: [email or web address] OR  [email or web address] OR  https://www.cdc.gov/steadi/patient.html

## 2025-04-22 NOTE — DISCHARGE NOTE PROVIDER - NSDCFUSCHEDAPPT_GEN_ALL_CORE_FT
Reed Gray  [facility]  HEPATOLOGY [address] [place] [address]   Scheduled Appointment: 04/30/2025

## 2025-04-22 NOTE — DISCHARGE NOTE PROVIDER - NSDCMRMEDTOKEN_GEN_ALL_CORE_FT
magnesium oxide 400 mg oral tablet: 1 tab(s) orally 2 times a day (with meals)  rifAXIMin 550 mg oral tablet: 1 tab(s) orally 2 times a day

## 2025-04-22 NOTE — DISCHARGE NOTE PROVIDER - CARE PROVIDER_API CALL
Reed Gray  [facility]  [address] [place] [id number]  Phone: [phone]  Fax: [phone]  Follow Up Time:

## 2025-04-22 NOTE — BH CONSULTATION LIAISON PROGRESS NOTE - NSBHFUPINTERVALHXFT_PSY_A_CORE
Patient again receiving more IVP PRns overnight that daytime - usually happens if he wants to get out of bed etc and that is scored as "agitation" and added to a  CIWA score.  Patient again receiving more IVP PRns overnight that daytime - usually happens if he wants to get out of bed etc and that is scored as "agitation" and added to a  CIWA score. No issues daytime thus far. Seen at bedside; Pt's wife also present who feels like he is better and willing ot take him home. Patient awake, alert, oriented x 4, calm, cooperative, polite, reports he feels better, appetite coming back, denies alcohol withdrawal sxs and also does not clinically manifest any.   No acute psychiatric sxs or complaints. he was talking about going to [facility] clinic soon. Denies any active or passive suicidal or homicidal ideation. Names protective factors (uma; family; hope for future). Denies illicit substance use and no known access to guns.

## 2025-04-23 ENCOUNTER — INPATIENT (INPATIENT)
Facility: HOSPITAL | Age: 60
LOS: 3 days | Discharge: HOME | DRG: 897 | End: 2025-04-27
Attending: STUDENT IN AN ORGANIZED HEALTH CARE EDUCATION/TRAINING PROGRAM | Admitting: STUDENT IN AN ORGANIZED HEALTH CARE EDUCATION/TRAINING PROGRAM
Payer: MEDICAID

## 2025-04-23 VITALS
DIASTOLIC BLOOD PRESSURE: 96 MMHG | SYSTOLIC BLOOD PRESSURE: 148 MMHG | HEIGHT: 67 IN | RESPIRATION RATE: 124 BRPM | WEIGHT: 186.95 LBS | TEMPERATURE: 98 F | OXYGEN SATURATION: 97 % | HEART RATE: 18 BPM

## 2025-04-23 DIAGNOSIS — R11.0 NAUSEA: ICD-10-CM

## 2025-04-23 DIAGNOSIS — K70.30 ALCOHOLIC CIRRHOSIS OF LIVER WITHOUT ASCITES: ICD-10-CM

## 2025-04-23 DIAGNOSIS — F10.939 ALCOHOL USE, UNSPECIFIED WITH WITHDRAWAL, UNSPECIFIED: ICD-10-CM

## 2025-04-23 DIAGNOSIS — F10.239 ALCOHOL DEPENDENCE WITH WITHDRAWAL, UNSPECIFIED: ICD-10-CM

## 2025-04-23 DIAGNOSIS — Z29.9 ENCOUNTER FOR PROPHYLACTIC MEASURES, UNSPECIFIED: ICD-10-CM

## 2025-04-23 PROBLEM — D61.818 OTHER PANCYTOPENIA: Chronic | Status: ACTIVE | Noted: 2025-04-21

## 2025-04-23 PROBLEM — Z87.01 PERSONAL HISTORY OF PNEUMONIA (RECURRENT): Chronic | Status: ACTIVE | Noted: 2025-04-21

## 2025-04-23 PROBLEM — F10.20 ALCOHOL DEPENDENCE, UNCOMPLICATED: Chronic | Status: ACTIVE | Noted: 2025-04-21

## 2025-04-23 PROBLEM — Z87.19 PERSONAL HISTORY OF OTHER DISEASES OF THE DIGESTIVE SYSTEM: Chronic | Status: ACTIVE | Noted: 2025-04-20

## 2025-04-23 PROBLEM — K22.6 GASTRO-ESOPHAGEAL LACERATION-HEMORRHAGE SYNDROME: Chronic | Status: ACTIVE | Noted: 2025-04-21

## 2025-04-23 PROBLEM — Z86.59 PERSONAL HISTORY OF OTHER MENTAL AND BEHAVIORAL DISORDERS: Chronic | Status: ACTIVE | Noted: 2025-04-21

## 2025-04-23 PROBLEM — Z87.19 PERSONAL HISTORY OF OTHER DISEASES OF THE DIGESTIVE SYSTEM: Chronic | Status: ACTIVE | Noted: 2025-04-21

## 2025-04-23 PROBLEM — K70.40 ALCOHOLIC HEPATIC FAILURE WITHOUT COMA: Chronic | Status: ACTIVE | Noted: 2025-04-21

## 2025-04-23 PROBLEM — D64.9 ANEMIA, UNSPECIFIED: Chronic | Status: ACTIVE | Noted: 2025-04-21

## 2025-04-23 LAB
ALBUMIN SERPL ELPH-MCNC: 4.2 G/DL — SIGNIFICANT CHANGE UP (ref 3.3–5)
ALP SERPL-CCNC: 109 U/L — SIGNIFICANT CHANGE UP (ref 40–120)
ALT FLD-CCNC: 111 U/L — HIGH (ref 10–45)
ANION GAP SERPL CALC-SCNC: 15 MMOL/L — SIGNIFICANT CHANGE UP (ref 5–17)
ANISOCYTOSIS BLD QL: SIGNIFICANT CHANGE UP
APTT BLD: 28.7 SEC — SIGNIFICANT CHANGE UP (ref 26.1–36.8)
AST SERPL-CCNC: 132 U/L — HIGH (ref 10–40)
AUER BODIES BLD QL SMEAR: PRESENT — SIGNIFICANT CHANGE UP
BASOPHILS # BLD AUTO: 0 K/UL — SIGNIFICANT CHANGE UP (ref 0–0.2)
BASOPHILS NFR BLD AUTO: 0 % — SIGNIFICANT CHANGE UP (ref 0–2)
BILIRUB SERPL-MCNC: 0.9 MG/DL — SIGNIFICANT CHANGE UP (ref 0.2–1.2)
BUN SERPL-MCNC: 9 MG/DL — SIGNIFICANT CHANGE UP (ref 7–23)
CALCIUM SERPL-MCNC: 9.8 MG/DL — SIGNIFICANT CHANGE UP (ref 8.4–10.5)
CHLORIDE SERPL-SCNC: 109 MMOL/L — HIGH (ref 96–108)
CO2 SERPL-SCNC: 18 MMOL/L — LOW (ref 22–31)
CREAT SERPL-MCNC: 1.28 MG/DL — SIGNIFICANT CHANGE UP (ref 0.5–1.3)
EGFR: 65 ML/MIN/1.73M2 — SIGNIFICANT CHANGE UP
EGFR: 65 ML/MIN/1.73M2 — SIGNIFICANT CHANGE UP
EOSINOPHIL # BLD AUTO: 0.08 K/UL — SIGNIFICANT CHANGE UP (ref 0–0.5)
EOSINOPHIL NFR BLD AUTO: 1.8 % — SIGNIFICANT CHANGE UP (ref 0–6)
ETHANOL SERPL-MCNC: 138 MG/DL — HIGH (ref 0–10)
GLUCOSE SERPL-MCNC: 93 MG/DL — SIGNIFICANT CHANGE UP (ref 70–99)
HCT VFR BLD CALC: 35.2 % — LOW (ref 39–50)
HGB BLD-MCNC: 10.6 G/DL — LOW (ref 13–17)
HYPOCHROMIA BLD QL: SLIGHT — SIGNIFICANT CHANGE UP
INR BLD: 1.14 RATIO — SIGNIFICANT CHANGE UP (ref 0.85–1.16)
LYMPHOCYTES # BLD AUTO: 1.77 K/UL — SIGNIFICANT CHANGE UP (ref 1–3.3)
LYMPHOCYTES # BLD AUTO: 38.9 % — SIGNIFICANT CHANGE UP (ref 13–44)
MACROCYTES BLD QL: SLIGHT — SIGNIFICANT CHANGE UP
MAGNESIUM SERPL-MCNC: 1.8 MG/DL — SIGNIFICANT CHANGE UP (ref 1.6–2.6)
MANUAL SMEAR VERIFICATION: SIGNIFICANT CHANGE UP
MCHC RBC-ENTMCNC: 21.6 PG — LOW (ref 27–34)
MCHC RBC-ENTMCNC: 30.1 G/DL — LOW (ref 32–36)
MCV RBC AUTO: 71.8 FL — LOW (ref 80–100)
MICROCYTES BLD QL: SIGNIFICANT CHANGE UP
MONOCYTES # BLD AUTO: 0.12 K/UL — SIGNIFICANT CHANGE UP (ref 0–0.9)
MONOCYTES NFR BLD AUTO: 2.7 % — SIGNIFICANT CHANGE UP (ref 2–14)
NEUTROPHILS # BLD AUTO: 2.57 K/UL — SIGNIFICANT CHANGE UP (ref 1.8–7.4)
NEUTROPHILS NFR BLD AUTO: 56.6 % — SIGNIFICANT CHANGE UP (ref 43–77)
NRBC # BLD: 1 /100 WBCS — HIGH (ref 0–0)
NRBC BLD-RTO: 1 /100 WBCS — HIGH (ref 0–0)
PHOSPHATE SERPL-MCNC: 2.7 MG/DL — SIGNIFICANT CHANGE UP (ref 2.5–4.5)
PLAT MORPH BLD: NORMAL — SIGNIFICANT CHANGE UP
PLATELET # BLD AUTO: 175 K/UL — SIGNIFICANT CHANGE UP (ref 150–400)
POLYCHROMASIA BLD QL SMEAR: SLIGHT — SIGNIFICANT CHANGE UP
POTASSIUM SERPL-MCNC: 4.1 MMOL/L — SIGNIFICANT CHANGE UP (ref 3.5–5.3)
POTASSIUM SERPL-SCNC: 4.1 MMOL/L — SIGNIFICANT CHANGE UP (ref 3.5–5.3)
PROT SERPL-MCNC: 8.2 G/DL — SIGNIFICANT CHANGE UP (ref 6–8.3)
PROTHROM AB SERPL-ACNC: 13 SEC — SIGNIFICANT CHANGE UP (ref 9.9–13.4)
RBC # BLD: 4.9 M/UL — SIGNIFICANT CHANGE UP (ref 4.2–5.8)
RBC # FLD: 20.8 % — HIGH (ref 10.3–14.5)
RBC BLD AUTO: ABNORMAL
SODIUM SERPL-SCNC: 142 MMOL/L — SIGNIFICANT CHANGE UP (ref 135–145)
WBC # BLD: 4.54 K/UL — SIGNIFICANT CHANGE UP (ref 3.8–10.5)
WBC # FLD AUTO: 4.54 K/UL — SIGNIFICANT CHANGE UP (ref 3.8–10.5)

## 2025-04-23 PROCEDURE — 99285 EMERGENCY DEPT VISIT HI MDM: CPT | Mod: 25

## 2025-04-23 PROCEDURE — 99223 1ST HOSP IP/OBS HIGH 75: CPT

## 2025-04-23 PROCEDURE — 71045 X-RAY EXAM CHEST 1 VIEW: CPT | Mod: 26

## 2025-04-23 RX ORDER — LORAZEPAM 4 MG/ML
2 VIAL (ML) INJECTION
Refills: 0 | Status: DISCONTINUED | OUTPATIENT
Start: 2025-04-23 | End: 2025-04-27

## 2025-04-23 RX ORDER — LORAZEPAM 4 MG/ML
1.5 VIAL (ML) INJECTION EVERY 6 HOURS
Refills: 0 | Status: DISCONTINUED | OUTPATIENT
Start: 2025-04-24 | End: 2025-04-25

## 2025-04-23 RX ORDER — DIAZEPAM 2 MG/1
10 TABLET ORAL ONCE
Refills: 0 | Status: DISCONTINUED | OUTPATIENT
Start: 2025-04-23 | End: 2025-04-23

## 2025-04-23 RX ORDER — ACETAMINOPHEN 500 MG/5ML
1000 LIQUID (ML) ORAL ONCE
Refills: 0 | Status: COMPLETED | OUTPATIENT
Start: 2025-04-23 | End: 2025-04-23

## 2025-04-23 RX ORDER — ONDANSETRON HCL/PF 4 MG/2 ML
4 VIAL (ML) INJECTION EVERY 8 HOURS
Refills: 0 | Status: DISCONTINUED | OUTPATIENT
Start: 2025-04-23 | End: 2025-04-27

## 2025-04-23 RX ORDER — FOLIC ACID 1 MG/1
1 TABLET ORAL ONCE
Refills: 0 | Status: COMPLETED | OUTPATIENT
Start: 2025-04-23 | End: 2025-04-23

## 2025-04-23 RX ORDER — LORAZEPAM 4 MG/ML
1 VIAL (ML) INJECTION EVERY 6 HOURS
Refills: 0 | Status: DISCONTINUED | OUTPATIENT
Start: 2025-04-25 | End: 2025-04-26

## 2025-04-23 RX ORDER — MAGNESIUM SULFATE 500 MG/ML
1 SYRINGE (ML) INJECTION ONCE
Refills: 0 | Status: COMPLETED | OUTPATIENT
Start: 2025-04-23 | End: 2025-04-23

## 2025-04-23 RX ORDER — LORAZEPAM 4 MG/ML
2 VIAL (ML) INJECTION EVERY 6 HOURS
Refills: 0 | Status: DISCONTINUED | OUTPATIENT
Start: 2025-04-23 | End: 2025-04-24

## 2025-04-23 RX ORDER — ONDANSETRON HCL/PF 4 MG/2 ML
4 VIAL (ML) INJECTION ONCE
Refills: 0 | Status: COMPLETED | OUTPATIENT
Start: 2025-04-23 | End: 2025-04-23

## 2025-04-23 RX ORDER — B1/B2/B3/B5/B6/B12/VIT C/FOLIC 500-0.5 MG
1 TABLET ORAL DAILY
Refills: 0 | Status: DISCONTINUED | OUTPATIENT
Start: 2025-04-23 | End: 2025-04-27

## 2025-04-23 RX ORDER — LORAZEPAM 4 MG/ML
0.5 VIAL (ML) INJECTION EVERY 6 HOURS
Refills: 0 | Status: DISCONTINUED | OUTPATIENT
Start: 2025-04-26 | End: 2025-04-27

## 2025-04-23 RX ORDER — FOLIC ACID 1 MG/1
1 TABLET ORAL DAILY
Refills: 0 | Status: DISCONTINUED | OUTPATIENT
Start: 2025-04-23 | End: 2025-04-27

## 2025-04-23 RX ORDER — LORAZEPAM 4 MG/ML
VIAL (ML) INJECTION
Refills: 0 | Status: COMPLETED | OUTPATIENT
Start: 2025-04-23 | End: 2025-04-27

## 2025-04-23 RX ADMIN — Medication 2 MILLIGRAM(S): at 23:03

## 2025-04-23 RX ADMIN — Medication 4 MILLIGRAM(S): at 01:54

## 2025-04-23 RX ADMIN — Medication 2 MILLIGRAM(S): at 17:45

## 2025-04-23 RX ADMIN — Medication 2 MILLIGRAM(S): at 11:50

## 2025-04-23 RX ADMIN — DIAZEPAM 10 MILLIGRAM(S): 2 TABLET ORAL at 05:17

## 2025-04-23 RX ADMIN — Medication 100 GRAM(S): at 06:37

## 2025-04-23 RX ADMIN — DIAZEPAM 10 MILLIGRAM(S): 2 TABLET ORAL at 02:16

## 2025-04-23 RX ADMIN — Medication 40 MILLIGRAM(S): at 06:38

## 2025-04-23 RX ADMIN — FOLIC ACID 1 MILLIGRAM(S): 1 TABLET ORAL at 02:01

## 2025-04-23 RX ADMIN — Medication 400 MILLIGRAM(S): at 01:54

## 2025-04-23 RX ADMIN — FOLIC ACID 1 MILLIGRAM(S): 1 TABLET ORAL at 11:52

## 2025-04-23 RX ADMIN — Medication 1000 MILLILITER(S): at 02:01

## 2025-04-23 RX ADMIN — Medication 125 MILLILITER(S): at 06:37

## 2025-04-23 RX ADMIN — Medication 4 MILLIGRAM(S): at 10:19

## 2025-04-23 RX ADMIN — Medication 20 MILLIGRAM(S): at 02:56

## 2025-04-23 RX ADMIN — Medication 100 MILLIGRAM(S): at 02:01

## 2025-04-23 RX ADMIN — DIAZEPAM 10 MILLIGRAM(S): 2 TABLET ORAL at 04:26

## 2025-04-23 NOTE — H&P ADULT - NSICDXPASTMEDICALHX_GEN_ALL_CORE_FT
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

## 2025-04-23 NOTE — H&P ADULT - NSHPPHYSICALEXAM_GEN_ALL_CORE
ICU Vital Signs Last 24 Hrs  T(C): 36.4 (23 Apr 2025 05:07), Max: 36.9 (23 Apr 2025 00:44)  T(F): 97.6 (23 Apr 2025 05:07), Max: 98.4 (23 Apr 2025 00:44)  HR: 106 (23 Apr 2025 05:07) (18 - 120)  BP: 153/99 (23 Apr 2025 05:07) (118/79 - 155/96)  RR: 18 (23 Apr 2025 05:07) (18 - 124)  SpO2: 98% (23 Apr 2025 05:07) (89% - 100%)  O2 Parameters below as of 23 Apr 2025 05:07  Patient On (Oxygen Delivery Method): room air

## 2025-04-23 NOTE — ED ADULT NURSE NOTE - OBJECTIVE STATEMENT
59 y/o M 59 y/o M complaining of 61 y/o M complaining of rectal bleeding and bloody emesis. Pt was discharged yesterday for ETOH withdrawal and is unreliable narrator. Pt states that he ate a piece of goat and feels like it's stuck in his throat. pt states he's bleeding rectally but says it's yellow in color. Pt denies ETOH use. AAOx4. Breathing spontaneously but pt sounds gurgly during speech. skin is yellow in color, dry, and warm.

## 2025-04-23 NOTE — H&P ADULT - ASSESSMENT
60 year old male with PMHx EtOH abuse (c/b prior withdrawals with delirium tremors and previously requiring intubation due to hypoxia), cirrhosis, and PUD  60 year old male with PMHx chronic EtOH abuse with prior withdrawals with delirium tremors and previously requiring intubation due to hypoxia, alcoholic cirrhosis, and gastritis/PUD/Jacque Kostas tear presents with vomiting admitted with alcohol withdraw.

## 2025-04-23 NOTE — ED PROVIDER NOTE - ATTENDING CONTRIBUTION TO CARE
60M with complex medical history of etoh misuse and AHRF requiring intubation in past 2/2 aspiration here for nausea, NBNB emesis, and loose nonbloody stools x 5 days, stating has not drank etoh or taken anything by mouth for the last 5 days. Triage note states patient had rectal bleeding but denies this. Denies chest pain, shortness of breath, headache, tremors, si/hi, hallucinations. Patient did state he tried goat today and choked.    Hemodynamically stable but tachycardic, found to be hypoxic on Ra to 89%, improved to 93% on 4L, + tremors.. NAD. AAOx4 (person, place, time, event). PERRL 3mm, EOMI w/o nystagmus, dehydrated, tachycardic rate reg rhythm, no audible cardiac murmurs. clear lungs, no inc work of breathing. benign abdomen, - anderson, no peritoneal signs, no cva ttp. no visible rashes nor deformities, no signs of jaundice, fluid overload, nor anemia. symm calves, no edema. full str/rom/neurovasc all 4 extrem.    Tachycardic, hypoxic with reported no PO intake but also tremors w/o e/o inc wob, unclear if aspiration event vs misreporting of etoh use. Presume etoh w/d for now with possible component of aspiration pneumonitis. do not think PE based on hx. Labs, cxr, ekg, supportive care. Summary of presentation, physical exam findings, plan, expected turn around time for diagnostics discussed with patient. Agrees.

## 2025-04-23 NOTE — H&P ADULT - PROBLEM SELECTOR PLAN 1
Received Valium 10mg iv x3 (2am, 4am and 5am) last CIWA 14, alcohol level 138 on presentation  - CIWA monitor and  Ativan taper with prn  - aspiration precaution  - unclear indication for telemetry, sinus tachycardia on EKG likely due to withdraw

## 2025-04-23 NOTE — H&P ADULT - PROBLEM SELECTOR PLAN 3
history of ascites s/p large volume paracentesis, lost of follow up with Hepatology outpatient   - will continue Rifaximin and Lactulose to 2-3BM daily  - no signs of hepatic encephalopathy at this time

## 2025-04-23 NOTE — ED PROVIDER NOTE - EKG/XRAY ADDITIONAL INFORMATION
Chip CATHERINE (ED Attending), independently interpreted the EKG:  Interpreted at: 1:59 AM  Sinus tachycardia rate 102  QRS 78 QTc 453  No diagnostic ischemic ekg changes.

## 2025-04-23 NOTE — ED ADULT TRIAGE NOTE - NS ED NURSE BANDS TYPE
Name band;
Detail Level: Zone
Additional Notes: Patient consent was obtained to proceed with the visit and recommended plan of care after discussion of all risks and benefits, including the risks of COVID-19 exposure.

## 2025-04-23 NOTE — ED PROVIDER NOTE - CLINICAL SUMMARY MEDICAL DECISION MAKING FREE TEXT BOX
60 year old male with PMHx EtOH abuse (c/b prior withdrawals with delirium tremors and previously requiring intubation due to hypoxia), cirrhosis, and PUD p/w Vomiting associate with diarrhea.  Patient states he was recently discharged from the hospital.  Despite triage note, patient does not endorse any bloody stools.  Denies any alcohol drinking today.  Denies any drug use.  Denies any chest pain, abdominal pain, urinary complaints.    Conor Thurston DO (PGY3)   Physical Exam:    Gen: NAD, AOx3  Head: NCAT  HEENT: EOMI, PEERLA  Lung: +upper airway sounds  CV: +tachycardic   Abd: soft, NT, ND, no guarding, no rigidity, no rebound tenderness, no CVA tenderness   MSK: no visible deformities, ROM normal in UE/LE, no back pain  Neuro: No focal sensory or motor deficits. Sensation intact to light touch all extremities.  Skin: Warm, well perfused, no rash, no leg swelling    Patient tachycardic upon arrival to 120s, hypoxic to low 90s was placed on 3 L nasal cannula. Concern for aspiration pneumonitis versus alcohol withdrawal versus infectious etiology of his metabolic derangement. Final dispo pending labs, imaging, close reassessments.

## 2025-04-23 NOTE — ED ADULT NURSE NOTE - NSFALLRISKINTERV_ED_ALL_ED
Assistance OOB with selected safe patient handling equipment if applicable/Assistance with ambulation/Communicate fall risk and risk factors to all staff, patient, and family/Monitor gait and stability/Monitor for mental status changes and reorient to person, place, and time, as needed/Provide visual cue: yellow wristband, yellow gown, etc/Reinforce activity limits and safety measures with patient and family/Toileting schedule using arm’s reach rule for commode and bathroom/Use of alarms - bed, stretcher, chair and/or video monitoring/Call bell, personal items and telephone in reach/Instruct patient to call for assistance before getting out of bed/chair/stretcher/Non-slip footwear applied when patient is off stretcher/[place] to call system/Physically safe environment - no spills, clutter or unnecessary equipment/Purposeful Proactive Rounding/Room/bathroom lighting operational, light cord in reach

## 2025-04-23 NOTE — ED ADULT TRIAGE NOTE - CHIEF COMPLAINT QUOTE
Rectally bleeding and hematemesis x2 days associated shortness of breath and fatigue. Associated chest pain, nausea and vomiting. Denies fever and chills. pmh: Liver Failure

## 2025-04-23 NOTE — H&P ADULT - HISTORY OF PRESENT ILLNESS
60 year old male with PMHx EtOH abuse (c/b prior withdrawals with delirium tremors and previously requiring intubation due to hypoxia), cirrhosis, and PUD presents with  Unable to obtain history as patient is upset due to loosing his phone in ED, will not give further information besides asking to look for his phone.  Hx mainly obtained from ED documentations and chart review  60 year old male with PMHx chronic EtOH abuse with prior withdrawals with delirium tremors and previously requiring intubation due to hypoxia, alcoholic cirrhosis, and gastritis/PUD/Jacque Kostas tear presents with vomiting per documentation.  No contact number listed for family collaterals.  He was recently hospitalized (4/19-4/22) for alcohol intoxication and withdraw at [facility], treated with Valium prn due to Ativan shortage.  Currently, alert and agitated looking for his phone

## 2025-04-23 NOTE — ED PROVIDER NOTE - PROGRESS NOTE DETAILS
Conor Thurston DO (PGY3)  patient placed on CIWA protocol, given 10 of dizapem for withdrawal like sxs. Will admit to medicine.

## 2025-04-24 DIAGNOSIS — D64.9 ANEMIA, UNSPECIFIED: ICD-10-CM

## 2025-04-24 LAB
ALBUMIN SERPL ELPH-MCNC: 3.1 G/DL — LOW (ref 3.3–5)
ALP SERPL-CCNC: 79 U/L — SIGNIFICANT CHANGE UP (ref 40–120)
ALT FLD-CCNC: 65 U/L — HIGH (ref 10–45)
ANION GAP SERPL CALC-SCNC: 13 MMOL/L — SIGNIFICANT CHANGE UP (ref 5–17)
AST SERPL-CCNC: 75 U/L — HIGH (ref 10–40)
BASOPHILS # BLD AUTO: 0.01 K/UL — SIGNIFICANT CHANGE UP (ref 0–0.2)
BASOPHILS NFR BLD AUTO: 0.2 % — SIGNIFICANT CHANGE UP (ref 0–2)
BILIRUB SERPL-MCNC: 1 MG/DL — SIGNIFICANT CHANGE UP (ref 0.2–1.2)
BUN SERPL-MCNC: 10 MG/DL — SIGNIFICANT CHANGE UP (ref 7–23)
CALCIUM SERPL-MCNC: 8.2 MG/DL — LOW (ref 8.4–10.5)
CHLORIDE SERPL-SCNC: 108 MMOL/L — SIGNIFICANT CHANGE UP (ref 96–108)
CO2 SERPL-SCNC: 18 MMOL/L — LOW (ref 22–31)
CREAT SERPL-MCNC: 0.95 MG/DL — SIGNIFICANT CHANGE UP (ref 0.5–1.3)
CULTURE RESULTS: SIGNIFICANT CHANGE UP
CULTURE RESULTS: SIGNIFICANT CHANGE UP
EGFR: 93 ML/MIN/1.73M2 — SIGNIFICANT CHANGE UP
EGFR: 93 ML/MIN/1.73M2 — SIGNIFICANT CHANGE UP
EOSINOPHIL # BLD AUTO: 0.11 K/UL — SIGNIFICANT CHANGE UP (ref 0–0.5)
EOSINOPHIL NFR BLD AUTO: 2.7 % — SIGNIFICANT CHANGE UP (ref 0–6)
GLUCOSE SERPL-MCNC: 82 MG/DL — SIGNIFICANT CHANGE UP (ref 70–99)
HCT VFR BLD CALC: 27.8 % — LOW (ref 39–50)
HGB BLD-MCNC: 8.4 G/DL — LOW (ref 13–17)
IMM GRANULOCYTES NFR BLD AUTO: 0.2 % — SIGNIFICANT CHANGE UP (ref 0–0.9)
LYMPHOCYTES # BLD AUTO: 1.82 K/UL — SIGNIFICANT CHANGE UP (ref 1–3.3)
LYMPHOCYTES # BLD AUTO: 44.3 % — HIGH (ref 13–44)
MAGNESIUM SERPL-MCNC: 1.5 MG/DL — LOW (ref 1.6–2.6)
MCHC RBC-ENTMCNC: 21.9 PG — LOW (ref 27–34)
MCHC RBC-ENTMCNC: 30.2 G/DL — LOW (ref 32–36)
MCV RBC AUTO: 72.4 FL — LOW (ref 80–100)
MONOCYTES # BLD AUTO: 0.38 K/UL — SIGNIFICANT CHANGE UP (ref 0–0.9)
MONOCYTES NFR BLD AUTO: 9.2 % — SIGNIFICANT CHANGE UP (ref 2–14)
NEUTROPHILS # BLD AUTO: 1.78 K/UL — LOW (ref 1.8–7.4)
NEUTROPHILS NFR BLD AUTO: 43.4 % — SIGNIFICANT CHANGE UP (ref 43–77)
NRBC BLD AUTO-RTO: 0 /100 WBCS — SIGNIFICANT CHANGE UP (ref 0–0)
PHOSPHATE SERPL-MCNC: 2.7 MG/DL — SIGNIFICANT CHANGE UP (ref 2.5–4.5)
PLATELET # BLD AUTO: 140 K/UL — LOW (ref 150–400)
POTASSIUM SERPL-MCNC: 3.8 MMOL/L — SIGNIFICANT CHANGE UP (ref 3.5–5.3)
POTASSIUM SERPL-SCNC: 3.8 MMOL/L — SIGNIFICANT CHANGE UP (ref 3.5–5.3)
PROT SERPL-MCNC: 6.2 G/DL — SIGNIFICANT CHANGE UP (ref 6–8.3)
RBC # BLD: 3.84 M/UL — LOW (ref 4.2–5.8)
RBC # FLD: 21.2 % — HIGH (ref 10.3–14.5)
SODIUM SERPL-SCNC: 139 MMOL/L — SIGNIFICANT CHANGE UP (ref 135–145)
SPECIMEN SOURCE: SIGNIFICANT CHANGE UP
SPECIMEN SOURCE: SIGNIFICANT CHANGE UP
WBC # BLD: 4.11 K/UL — SIGNIFICANT CHANGE UP (ref 3.8–10.5)
WBC # FLD AUTO: 4.11 K/UL — SIGNIFICANT CHANGE UP (ref 3.8–10.5)

## 2025-04-24 PROCEDURE — 99233 SBSQ HOSP IP/OBS HIGH 50: CPT

## 2025-04-24 RX ORDER — LACTULOSE 10 G/15ML
10 SOLUTION ORAL THREE TIMES A DAY
Refills: 0 | Status: DISCONTINUED | OUTPATIENT
Start: 2025-04-24 | End: 2025-04-24

## 2025-04-24 RX ORDER — LACTULOSE 10 G/15ML
20 SOLUTION ORAL
Refills: 0 | Status: DISCONTINUED | OUTPATIENT
Start: 2025-04-24 | End: 2025-04-27

## 2025-04-24 RX ADMIN — Medication 40 MILLIGRAM(S): at 05:41

## 2025-04-24 RX ADMIN — Medication 1 TABLET(S): at 20:50

## 2025-04-24 RX ADMIN — Medication 125 MILLILITER(S): at 21:03

## 2025-04-24 RX ADMIN — Medication 2 MILLIGRAM(S): at 18:35

## 2025-04-24 RX ADMIN — Medication 100 MILLIGRAM(S): at 20:50

## 2025-04-24 RX ADMIN — Medication 125 MILLILITER(S): at 17:40

## 2025-04-24 RX ADMIN — Medication 2 MILLIGRAM(S): at 08:33

## 2025-04-24 RX ADMIN — Medication 2 MILLIGRAM(S): at 05:12

## 2025-04-24 RX ADMIN — Medication 1.5 MILLIGRAM(S): at 17:21

## 2025-04-24 RX ADMIN — Medication 1.5 MILLIGRAM(S): at 11:17

## 2025-04-24 RX ADMIN — LACTULOSE 20 GRAM(S): 10 SOLUTION ORAL at 17:23

## 2025-04-24 RX ADMIN — FOLIC ACID 1 MILLIGRAM(S): 1 TABLET ORAL at 20:50

## 2025-04-24 NOTE — ED ADULT TRIAGE NOTE - CADM TRG TX PRIOR TO ARRIVAL
Subjective:      Darwin Fletcher  04/23/2025  [id number]      Chief Complaint: Fever (Fever(100.1* x last night), NC, SOB, wheezing, dizziness, BA x3d/Tylenol mild relief/Denies nausea, vomiting, diarrhea, CP, EA,/At home covid neg)       History of Present Illness    Mr Granados presents today for evaluation of cold symptoms after a recent cruise. Returned yesterday. He reports symptom onset during the last two days of his cruise to [place] and [place], with significant worsening on the final day. He was the only one affected in his group of six. His symptoms include cough, intermittent wheezing (not present during visit), nasal congestion with frequent sneezing, post-nasal drip, body aches, and fever. He has been taking Tylenol for fever and body ache management. A home COVID test was negative.         Past Medical History:   Diagnosis Date    Arthritis     Bulging of lumbar intervertebral disc     CAD (coronary artery disease)     Class 2 severe obesity due to excess calories with serious comorbidity and body mass index (BMI) of 36.0 to 36.9 in adult 07/05/2023    Digestive disorder     Gout 06/06/2022    Hypertension 06/06/2022    Hypothyroidism 06/06/2022    Personal history of colonic polyps     Rheumatoid arthritis     Sleep apnea 06/06/2022    CPAP    Synovitis and tenosynovitis, unspecified     Type 2 diabetes mellitus with diabetic polyneuropathy, without long-term current use of insulin 06/06/2022     Past Surgical History:   Procedure Laterality Date    BUNIONECTOMY Left     CATARACT EXTRACTION W/  INTRAOCULAR LENS IMPLANT Bilateral     CHOLECYSTECTOMY      COLONOSCOPY  03/17/2015    COLONOSCOPY Bilateral 07/20/2020    CORONARY ANGIOPLASTY WITH STENT PLACEMENT N/A     x3 stents    Excision of Skin Cancer (Arm)  N/A     EYE SURGERY      INJECTION OF ANESTHETIC AGENT AROUND MEDIAL BRANCH NERVES INNERVATING LUMBAR FACET JOINT N/A 10/26/2022    Procedure: Block-nerve-medial branch-lumbar;  Surgeon: Samreen RON  MD Coleen;  Location: [facility] OR;  Service: Pain Management;  Laterality: N/A;  Left L4/L5    KNEE ARTHROSCOPY Left     TRANSFER OF HAND TENDON Right 02/22/2024    Procedure: TRANSFER, TENDON, HAND;  Surgeon: Javy Price MD;  Location: [facility];  Service: Orthopedics;  Laterality: Right;    TRANSFORAMINAL EPIDURAL INJECTION OF STEROID Left 01/05/2023    Procedure: INJECTION, STEROID, EPIDURAL, TRANSFORAMINAL APPROACH Left L5;  Surgeon: Samreen Horne MD;  Location: [facility] OR;  Service: Pain Management;  Laterality: Left;  L5    VASECTOMY N/A      Family History   Problem Relation Name Age of Onset    Heart attack Mother      Lung cancer Father C Spenser Fletcher     Cancer Father C Spenser Fletcher      Social History     Tobacco Use    Smoking status: Former     Current packs/day: 2.00     Average packs/day: 2.0 packs/day for 35.0 years (70.0 ttl pk-yrs)     Types: Cigarettes    Smokeless tobacco: Never   Substance and Sexual Activity    Alcohol use: Yes     Alcohol/week: 9.0 standard drinks of alcohol     Types: 2 Glasses of wine, 2 Cans of beer, 3 Shots of liquor, 2 Drinks containing 0.5 oz of alcohol per week     Comment: twice weekly    Drug use: Never    Sexual activity: Yes     Partners: Female     Review of patient's allergies indicates:  No Known Allergies    The following were reviewed at this visit: active problem list, medication list, allergies, family history, social history, and health maintenance.    Medications:  Current Medications[1]      Medications have been reviewed and reconciled with patient at this visit.  Barriers to medications reviewed with patient.    Adverse reactions to current medications reviewed with patient..    Over the counter medications reviewed and reconciled with patient.  Review of Systems   Constitutional:  Positive for fever. Negative for chills, diaphoresis and weight loss.   HENT:  Positive for congestion. Negative for ear discharge, ear pain, sinus pain and sore throat.   "  Eyes:  Negative for photophobia.   Respiratory:  Positive for cough. Negative for hemoptysis, shortness of breath and wheezing.    Cardiovascular:  Negative for chest pain, palpitations, claudication, leg swelling and PND.   Gastrointestinal:  Negative for abdominal pain, constipation, diarrhea, nausea and vomiting.   Genitourinary:  Negative for dysuria, frequency and urgency.   Musculoskeletal:  Negative for back pain, joint pain, myalgias and neck pain.   Skin:  Negative for itching and rash.   Neurological:  Positive for headaches. Negative for dizziness and focal weakness.   Psychiatric/Behavioral:  Negative for depression. The patient does not have insomnia.            Objective:      Vitals:    04/21/25 1113   BP: 137/62   BP Location: Left arm   Patient Position: Sitting   Pulse: (!) 59   Resp: 18   Temp: 97.8 °F (36.6 °C)   SpO2: 97%   Weight: 106.1 kg (234 lb)   Height: 5' 9" (1.753 m)       Physical Exam  Constitutional:       Appearance: Normal appearance.   HENT:      Head: Normocephalic and atraumatic.      Mouth/Throat:      Pharynx: Posterior oropharyngeal erythema present.   Cardiovascular:      Rate and Rhythm: Normal rate and regular rhythm.      Pulses: Normal pulses.   Pulmonary:      Effort: Pulmonary effort is normal.      Breath sounds: Normal breath sounds.   Abdominal:      General: Abdomen is flat. Bowel sounds are normal. There is no distension.      Palpations: Abdomen is soft.      Tenderness: There is no abdominal tenderness.   Musculoskeletal:      Right lower leg: No edema.      Left lower leg: No edema.   Lymphadenopathy:      Cervical: No cervical adenopathy.   Neurological:      General: No focal deficit present.      Mental Status: He is alert and oriented to person, place, and time.               Assessment and Plan:       1. Acute upper respiratory infection, unspecified  Assessment & Plan:  Covid/flu/RSV PCR obtained today  Advised patient to take Mucinex DM BID as needed " for congestion and postnasal drip and Tylenol as needed for fever and body aches   Lungs are clear to auscultation, no wheezing noted      Orders:  -     COVID/RSV/FLU A&B PCR; Future; Expected date: 04/21/2025            Follow up: No follow-ups on file.        Answers submitted by the patient for this visit:  Fever Questionnaire (Submitted on 4/21/2025)  Chief Complaint: Fever  Chronicity: new  Onset: yesterday  Frequency: daily  Progression since onset: unchanged  Max temp prior to arrival: 100 to 100.9 F  Temperature source: an oral thermometer  muscle aches: Yes  sleepiness: Yes  Treatments tried: acetaminophen, fluids  Improvement on treatment: mild         [1]   Current Outpatient Medications:     allopurinoL (ZYLOPRIM) 300 MG tablet, Take 1 tablet by mouth once daily., Disp: 90 tablet, Rfl: 2    amLODIPine (NORVASC) 10 MG tablet, Take 1 tablet (10 mg total) by mouth once daily., Disp: 90 tablet, Rfl: 3    aspirin 81 mg Cap, Take 1 tablet by mouth 2 (two) times a day., Disp: , Rfl:     blood sugar diagnostic (TRUE METRIX GLUCOSE TEST STRIP) Strp, USE TO CHECK CBG TWICE DAILY, Disp: 200 each, Rfl: 1    diclofenac (VOLTAREN) 75 MG EC tablet, Take 1 tablet by mouth twice daily., Disp: 180 tablet, Rfl: 0    ENBREL SURECLICK 50 mg/mL (1 mL) PnIj, Inject into the skin every 7 days., Disp: , Rfl:     fenofibrate 160 MG Tab, Take 1 tablet by mouth once daily., Disp: 90 tablet, Rfl: 1    folic acid (FOLVITE) 1 MG tablet, Take 1,000 mcg by mouth once daily., Disp: , Rfl:     iron-vit c-b12-folic acid (IRON 100 PLUS) Tab, 1 tablet Daily., Disp: , Rfl:     levothyroxine (SYNTHROID, LEVOTHROID) 175 MCG tablet, Take 1 tablet by mouth before breakfast., Disp: 90 tablet, Rfl: 2    lisinopriL 10 MG tablet, Take 1 tablet by mouth twice daily., Disp: 90 tablet, Rfl: 1    metFORMIN (GLUCOPHAGE) 500 MG tablet, Take 0.5 tablets (250 mg total) by mouth 2 (two) times daily with meals. Take 1/2 tablet bid, Disp: 180 tablet, Rfl: 2     methotrexate 2.5 MG Tab, Take 2.5 mg by mouth every 7 days., Disp: , Rfl:     pyridoxine, vitamin B6, (VITAMIN B-6) 50 MG Tab, Take 50 mg by mouth once daily., Disp: , Rfl:     thiamine (VITAMIN B-1) 100 MG tablet, Take 100 mg by mouth once daily., Disp: , Rfl:     traZODone (DESYREL) 50 MG tablet, Take 1 tablet by mouth every evening., Disp: 90 tablet, Rfl: 3  No current facility-administered medications for this visit.    Facility-Administered Medications Ordered in Other Visits:     acetaminophen tablet 1,000 mg, 1,000 mg, Oral, On Call Procedure, Edyta Zaidi MD, 1,000 mg at 02/22/24 0818    celecoxib capsule 200 mg, 200 mg, Oral, On Call Procedure, Edyta Zaidi MD, 200 mg at 02/22/24 0819    HYDROcodone-acetaminophen 5-325 mg per tablet 1 tablet, 1 tablet, Oral, Q3H PRN, Edyta Zaidi MD    HYDROmorphone (PF) injection 0.4 mg, 0.4 mg, Intravenous, Q5 Min PRN, Edyta Zaidi MD    midazolam (VERSED) 1 mg/mL injection 2 mg, 2 mg, Intravenous, On Call Procedure, Edyta Zaidi MD, 2 mg at 02/22/24 0840    ondansetron injection 4 mg, 4 mg, Intravenous, Daily PRN, Edyta Zaidi MD    prochlorperazine injection Soln 5 mg, 5 mg, Intravenous, Q30 Min PRN, Edyta Zaidi MD     none

## 2025-04-24 NOTE — PROGRESS NOTE ADULT - PROBLEM SELECTOR PLAN 5
PAS while in bed    D/w pt family at bedside who notes that pt has been confused and agitated in the past with alcohol withdrawl PAS while in bed    D/w pt family at bedside who notes that pt has been confused and agitated in the past with alcohol withdrawal at the end of last year

## 2025-04-24 NOTE — PROGRESS NOTE ADULT - SUBJECTIVE AND OBJECTIVE BOX
[facility] Division of Hospital Medicine  Katiuska Hunt MD  Available via MS Teams      SUBJECTIVE / OVERNIGHT EVENTS:    ADDITIONAL REVIEW OF SYSTEMS:    MEDICATIONS  (STANDING):  folic acid 1 milliGRAM(s) Oral daily  LORazepam     Tablet 1.5 milliGRAM(s) Oral every 6 hours  LORazepam     Tablet   Oral   multivitamin 1 Tablet(s) Oral daily  pantoprazole    Tablet 40 milliGRAM(s) Oral before breakfast  sodium chloride 0.9%. 1000 milliLiter(s) (125 mL/Hr) IV Continuous <Continuous>  thiamine 100 milliGRAM(s) Oral daily    MEDICATIONS  (PRN):  LORazepam     Tablet 2 milliGRAM(s) Oral every 1 hour PRN CIWA 8-14  LORazepam   Injectable 2 milliGRAM(s) IV Push every 1 hour PRN CIWA 15 or greater, or seizure  ondansetron Injectable 4 milliGRAM(s) IV Push every 8 hours PRN Nausea and/or Vomiting      I&O's Summary    23 Apr 2025 07:01  -  24 Apr 2025 07:00  --------------------------------------------------------  IN: 240 mL / OUT: 800 mL / NET: -560 mL    24 Apr 2025 07:01  -  24 Apr 2025 15:57  --------------------------------------------------------  IN: 150 mL / OUT: 0 mL / NET: 150 mL        PHYSICAL EXAM:  Vital Signs Last 24 Hrs  T(C): 36.7 (24 Apr 2025 12:11), Max: 36.7 (24 Apr 2025 04:57)  T(F): 98 (24 Apr 2025 12:11), Max: 98 (24 Apr 2025 04:57)  HR: 80 (24 Apr 2025 12:11) (60 - 84)  BP: 120/79 (24 Apr 2025 12:11) (120/79 - 155/89)  BP(mean): --  RR: 18 (24 Apr 2025 12:11) (18 - 18)  SpO2: 100% (24 Apr 2025 12:11) (98% - 100%)    Parameters below as of 24 Apr 2025 12:11  Patient On (Oxygen Delivery Method): room air      CONSTITUTIONAL: agitated  RESPIRATORY: Normal respiratory effort; lungs are clear to auscultation bilaterally  CARDIOVASCULAR: Regular rate and rhythm, normal S1 and S2  ABDOMEN: Nontender to palpation, normoactive bowel sounds  LABS:                        8.4    4.11  )-----------( 140      ( 24 Apr 2025 06:42 )             27.8     04-24    139  |  108  |  10  ----------------------------<  82  3.8   |  18[L]  |  0.95    Ca    8.2[L]      24 Apr 2025 06:40  Phos  2.7     04-24  Mg     1.5     04-24    TPro  6.2  /  Alb  3.1[L]  /  TBili  1.0  /  DBili  x   /  AST  75[H]  /  ALT  65[H]  /  AlkPhos  79  04-24    PT/INR - ( 23 Apr 2025 01:27 )   PT: 13.0 sec;   INR: 1.14 ratio         PTT - ( 23 Apr 2025 01:27 )  PTT:28.7 sec      Urinalysis Basic - ( 24 Apr 2025 06:40 )    Color: x / Appearance: x / SG: x / pH: x  Gluc: 82 mg/dL / Ketone: x  / Bili: x / Urobili: x   Blood: x / Protein: x / Nitrite: x   Leuk Esterase: x / RBC: x / WBC x   Sq Epi: x / Non Sq Epi: x / Bacteria: x               [facility] Division of Hospital Medicine  Katiuska Hunt MD  Available via MS Teams      SUBJECTIVE / OVERNIGHT EVENTS: agitated and confused    ADDITIONAL REVIEW OF SYSTEMS: not able to get at this time     MEDICATIONS  (STANDING):  folic acid 1 milliGRAM(s) Oral daily  LORazepam     Tablet 1.5 milliGRAM(s) Oral every 6 hours  LORazepam     Tablet   Oral   multivitamin 1 Tablet(s) Oral daily  pantoprazole    Tablet 40 milliGRAM(s) Oral before breakfast  sodium chloride 0.9%. 1000 milliLiter(s) (125 mL/Hr) IV Continuous <Continuous>  thiamine 100 milliGRAM(s) Oral daily    MEDICATIONS  (PRN):  LORazepam     Tablet 2 milliGRAM(s) Oral every 1 hour PRN CIWA 8-14  LORazepam   Injectable 2 milliGRAM(s) IV Push every 1 hour PRN CIWA 15 or greater, or seizure  ondansetron Injectable 4 milliGRAM(s) IV Push every 8 hours PRN Nausea and/or Vomiting      I&O's Summary    23 Apr 2025 07:01  -  24 Apr 2025 07:00  --------------------------------------------------------  IN: 240 mL / OUT: 800 mL / NET: -560 mL    24 Apr 2025 07:01  -  24 Apr 2025 15:57  --------------------------------------------------------  IN: 150 mL / OUT: 0 mL / NET: 150 mL        PHYSICAL EXAM:  Vital Signs Last 24 Hrs  T(C): 36.7 (24 Apr 2025 12:11), Max: 36.7 (24 Apr 2025 04:57)  T(F): 98 (24 Apr 2025 12:11), Max: 98 (24 Apr 2025 04:57)  HR: 80 (24 Apr 2025 12:11) (60 - 84)  BP: 120/79 (24 Apr 2025 12:11) (120/79 - 155/89)  BP(mean): --  RR: 18 (24 Apr 2025 12:11) (18 - 18)  SpO2: 100% (24 Apr 2025 12:11) (98% - 100%)    Parameters below as of 24 Apr 2025 12:11  Patient On (Oxygen Delivery Method): room air      CONSTITUTIONAL: agitated  RESPIRATORY: Normal respiratory effort; lungs are clear to auscultation bilaterally  CARDIOVASCULAR: Regular rate and rhythm, normal S1 and S2  ABDOMEN: Nontender to palpation, normoactive bowel sounds  LABS:                        8.4    4.11  )-----------( 140      ( 24 Apr 2025 06:42 )             27.8     04-24    139  |  108  |  10  ----------------------------<  82  3.8   |  18[L]  |  0.95    Ca    8.2[L]      24 Apr 2025 06:40  Phos  2.7     04-24  Mg     1.5     04-24    TPro  6.2  /  Alb  3.1[L]  /  TBili  1.0  /  DBili  x   /  AST  75[H]  /  ALT  65[H]  /  AlkPhos  79  04-24    PT/INR - ( 23 Apr 2025 01:27 )   PT: 13.0 sec;   INR: 1.14 ratio         PTT - ( 23 Apr 2025 01:27 )  PTT:28.7 sec      Urinalysis Basic - ( 24 Apr 2025 06:40 )    Color: x / Appearance: x / SG: x / pH: x  Gluc: 82 mg/dL / Ketone: x  / Bili: x / Urobili: x   Blood: x / Protein: x / Nitrite: x   Leuk Esterase: x / RBC: x / WBC x   Sq Epi: x / Non Sq Epi: x / Bacteria: x
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
## 2025-04-25 DIAGNOSIS — K74.60 UNSPECIFIED CIRRHOSIS OF LIVER: ICD-10-CM

## 2025-04-25 DIAGNOSIS — F10.229 ALCOHOL DEPENDENCE WITH INTOXICATION, UNSPECIFIED: ICD-10-CM

## 2025-04-25 DIAGNOSIS — D61.818 OTHER PANCYTOPENIA: ICD-10-CM

## 2025-04-25 DIAGNOSIS — K27.9 PEPTIC ULCER, SITE UNSPECIFIED, UNSPECIFIED AS ACUTE OR CHRONIC, WITHOUT HEMORRHAGE OR PERFORATION: ICD-10-CM

## 2025-04-25 DIAGNOSIS — D50.9 IRON DEFICIENCY ANEMIA, UNSPECIFIED: ICD-10-CM

## 2025-04-25 DIAGNOSIS — Z11.52 ENCOUNTER FOR SCREENING FOR COVID-19: ICD-10-CM

## 2025-04-25 DIAGNOSIS — Z79.899 OTHER LONG TERM (CURRENT) DRUG THERAPY: ICD-10-CM

## 2025-04-25 DIAGNOSIS — F10.239 ALCOHOL DEPENDENCE WITH WITHDRAWAL, UNSPECIFIED: ICD-10-CM

## 2025-04-25 DIAGNOSIS — R45.851 SUICIDAL IDEATIONS: ICD-10-CM

## 2025-04-25 LAB
ALBUMIN SERPL ELPH-MCNC: 3.5 G/DL — SIGNIFICANT CHANGE UP (ref 3.3–5)
ALP SERPL-CCNC: 94 U/L — SIGNIFICANT CHANGE UP (ref 40–120)
ALT FLD-CCNC: 68 U/L — HIGH (ref 10–45)
AMMONIA BLD-MCNC: 50 UMOL/L — SIGNIFICANT CHANGE UP (ref 11–55)
ANION GAP SERPL CALC-SCNC: 13 MMOL/L — SIGNIFICANT CHANGE UP (ref 5–17)
AST SERPL-CCNC: 69 U/L — HIGH (ref 10–40)
BILIRUB SERPL-MCNC: 1 MG/DL — SIGNIFICANT CHANGE UP (ref 0.2–1.2)
BUN SERPL-MCNC: 9 MG/DL — SIGNIFICANT CHANGE UP (ref 7–23)
CALCIUM SERPL-MCNC: 8.9 MG/DL — SIGNIFICANT CHANGE UP (ref 8.4–10.5)
CHLORIDE SERPL-SCNC: 108 MMOL/L — SIGNIFICANT CHANGE UP (ref 96–108)
CO2 SERPL-SCNC: 16 MMOL/L — LOW (ref 22–31)
CREAT SERPL-MCNC: 0.81 MG/DL — SIGNIFICANT CHANGE UP (ref 0.5–1.3)
EGFR: 102 ML/MIN/1.73M2 — SIGNIFICANT CHANGE UP
EGFR: 102 ML/MIN/1.73M2 — SIGNIFICANT CHANGE UP
FERRITIN SERPL-MCNC: 25 NG/ML — LOW (ref 30–400)
GLUCOSE SERPL-MCNC: 128 MG/DL — HIGH (ref 70–99)
HCT VFR BLD CALC: 31.3 % — LOW (ref 39–50)
HGB BLD-MCNC: 9.5 G/DL — LOW (ref 13–17)
INR BLD: 1.23 RATIO — HIGH (ref 0.85–1.16)
IRON SATN MFR SERPL: 26 UG/DL — LOW (ref 45–165)
IRON SATN MFR SERPL: 6 % — LOW (ref 16–55)
LDH SERPL L TO P-CCNC: 272 U/L — HIGH (ref 50–242)
MAGNESIUM SERPL-MCNC: 1.7 MG/DL — SIGNIFICANT CHANGE UP (ref 1.6–2.6)
MCHC RBC-ENTMCNC: 22.1 PG — LOW (ref 27–34)
MCHC RBC-ENTMCNC: 30.4 G/DL — LOW (ref 32–36)
MCV RBC AUTO: 72.8 FL — LOW (ref 80–100)
MELD SCORE WITH DIALYSIS: 22 POINTS — SIGNIFICANT CHANGE UP
MELD SCORE WITHOUT DIALYSIS: 9 POINTS — SIGNIFICANT CHANGE UP
NRBC BLD AUTO-RTO: 0 /100 WBCS — SIGNIFICANT CHANGE UP (ref 0–0)
PHOSPHATE SERPL-MCNC: 2.9 MG/DL — SIGNIFICANT CHANGE UP (ref 2.5–4.5)
PLATELET # BLD AUTO: 135 K/UL — LOW (ref 150–400)
POTASSIUM SERPL-MCNC: 3.7 MMOL/L — SIGNIFICANT CHANGE UP (ref 3.5–5.3)
POTASSIUM SERPL-SCNC: 3.7 MMOL/L — SIGNIFICANT CHANGE UP (ref 3.5–5.3)
PROT SERPL-MCNC: 7.2 G/DL — SIGNIFICANT CHANGE UP (ref 6–8.3)
PROTHROM AB SERPL-ACNC: 14.1 SEC — HIGH (ref 9.9–13.4)
RBC # BLD: 4.3 M/UL — SIGNIFICANT CHANGE UP (ref 4.2–5.8)
RBC # BLD: 4.3 M/UL — SIGNIFICANT CHANGE UP (ref 4.2–5.8)
RBC # FLD: 22.3 % — HIGH (ref 10.3–14.5)
RETICS #: 121.3 K/UL — SIGNIFICANT CHANGE UP (ref 25–125)
RETICS/RBC NFR: 2.8 % — HIGH (ref 0.5–2.5)
SODIUM SERPL-SCNC: 137 MMOL/L — SIGNIFICANT CHANGE UP (ref 135–145)
TIBC SERPL-MCNC: 434 UG/DL — HIGH (ref 220–430)
UIBC SERPL-MCNC: 408 UG/DL — HIGH (ref 110–370)
WBC # BLD: 2.29 K/UL — LOW (ref 3.8–10.5)
WBC # FLD AUTO: 2.29 K/UL — LOW (ref 3.8–10.5)

## 2025-04-25 PROCEDURE — 99232 SBSQ HOSP IP/OBS MODERATE 35: CPT

## 2025-04-25 RX ORDER — INFLUENZA A VIRUS A/IDAHO/07/2018 (H1N1) ANTIGEN (MDCK CELL DERIVED, PROPIOLACTONE INACTIVATED, INFLUENZA A VIRUS A/INDIANA/08/2018 (H3N2) ANTIGEN (MDCK CELL DERIVED, PROPIOLACTONE INACTIVATED), INFLUENZA B VIRUS B/SINGAPORE/INFTT-16-0610/2016 ANTIGEN (MDCK CELL DERIVED, PROPIOLACTONE INACTIVATED), INFLUENZA B VIRUS B/IOWA/06/2017 ANTIGEN (MDCK CELL DERIVED, PROPIOLACTONE INACTIVATED) 15; 15; 15; 15 UG/.5ML; UG/.5ML; UG/.5ML; UG/.5ML
0.5 INJECTION, SUSPENSION INTRAMUSCULAR ONCE
Refills: 0 | Status: DISCONTINUED | OUTPATIENT
Start: 2025-04-25 | End: 2025-04-27

## 2025-04-25 RX ADMIN — LACTULOSE 20 GRAM(S): 10 SOLUTION ORAL at 18:12

## 2025-04-25 RX ADMIN — LACTULOSE 20 GRAM(S): 10 SOLUTION ORAL at 05:48

## 2025-04-25 RX ADMIN — Medication 40 MILLIGRAM(S): at 05:49

## 2025-04-25 RX ADMIN — Medication 1 MILLIGRAM(S): at 18:12

## 2025-04-25 RX ADMIN — Medication 1.5 MILLIGRAM(S): at 05:48

## 2025-04-25 RX ADMIN — FOLIC ACID 1 MILLIGRAM(S): 1 TABLET ORAL at 12:18

## 2025-04-25 RX ADMIN — Medication 1 TABLET(S): at 12:18

## 2025-04-25 RX ADMIN — Medication 100 MILLIGRAM(S): at 12:18

## 2025-04-25 RX ADMIN — Medication 1 MILLIGRAM(S): at 12:18

## 2025-04-25 RX ADMIN — Medication 1.5 MILLIGRAM(S): at 00:05

## 2025-04-25 RX ADMIN — Medication 1 MILLIGRAM(S): at 23:29

## 2025-04-25 NOTE — PATIENT PROFILE ADULT - FALL HARM RISK - HARM RISK INTERVENTIONS

## 2025-04-25 NOTE — PROGRESS NOTE ADULT - SUBJECTIVE AND OBJECTIVE BOX
[facility] Division of Hospital Medicine  Katiuska Hunt MD  Available via MS Teams      SUBJECTIVE / OVERNIGHT EVENTS: No noted events overnight. Scored 8 x1. 5 throughout the day    ADDITIONAL REVIEW OF SYSTEMS: ROS negative    MEDICATIONS  (STANDING):  folic acid 1 milliGRAM(s) Oral daily  influenza   Vaccine 0.5 milliLiter(s) IntraMuscular once  lactulose Syrup 20 Gram(s) Oral two times a day  LORazepam     Tablet   Oral   LORazepam     Tablet 1 milliGRAM(s) Oral every 6 hours  multivitamin 1 Tablet(s) Oral daily  pantoprazole    Tablet 40 milliGRAM(s) Oral before breakfast  sodium chloride 0.9%. 1000 milliLiter(s) (125 mL/Hr) IV Continuous <Continuous>  thiamine 100 milliGRAM(s) Oral daily    MEDICATIONS  (PRN):  LORazepam     Tablet 2 milliGRAM(s) Oral every 1 hour PRN CIWA 8-14  LORazepam   Injectable 2 milliGRAM(s) IV Push every 1 hour PRN CIWA 15 or greater, or seizure  ondansetron Injectable 4 milliGRAM(s) IV Push every 8 hours PRN Nausea and/or Vomiting      I&O's Summary    24 Apr 2025 07:01  -  25 Apr 2025 07:00  --------------------------------------------------------  IN: 300 mL / OUT: 0 mL / NET: 300 mL    25 Apr 2025 07:01  -  25 Apr 2025 15:40  --------------------------------------------------------  IN: 180 mL / OUT: 0 mL / NET: 180 mL        PHYSICAL EXAM:  Vital Signs Last 24 Hrs  T(C): 36.3 (25 Apr 2025 12:09), Max: 36.8 (25 Apr 2025 05:39)  T(F): 97.4 (25 Apr 2025 12:09), Max: 98.2 (25 Apr 2025 05:39)  HR: 60 (25 Apr 2025 12:09) (53 - 71)  BP: 147/83 (25 Apr 2025 12:09) (137/78 - 166/99)  BP(mean): --  RR: 18 (25 Apr 2025 12:09) (18 - 18)  SpO2: 98% (25 Apr 2025 12:09) (98% - 100%)    Parameters below as of 25 Apr 2025 12:09  Patient On (Oxygen Delivery Method): room air          CONSTITUTIONAL: Laying in bed  RESPIRATORY: Normal respiratory effort; lungs are clear to auscultation bilaterally  CARDIOVASCULAR: Regular rate and rhythm, normal S1 and S2  ABDOMEN: Nontender to palpation, normoactive bowel sounds    LABS:                        9.5    2.29  )-----------( 135      ( 25 Apr 2025 10:38 )             31.3     04-25    137  |  108  |  9   ----------------------------<  128[H]  3.7   |  16[L]  |  0.81    Ca    8.9      25 Apr 2025 10:38  Phos  2.9     04-25  Mg     1.7     04-25    TPro  7.2  /  Alb  3.5  /  TBili  1.0  /  DBili  x   /  AST  69[H]  /  ALT  68[H]  /  AlkPhos  94  04-25    PT/INR - ( 25 Apr 2025 10:38 )   PT: 14.1 sec;   INR: 1.23 ratio               Urinalysis Basic - ( 25 Apr 2025 10:38 )    Color: x / Appearance: x / SG: x / pH: x  Gluc: 128 mg/dL / Ketone: x  / Bili: x / Urobili: x   Blood: x / Protein: x / Nitrite: x   Leuk Esterase: x / RBC: x / WBC x   Sq Epi: x / Non Sq Epi: x / Bacteria: x

## 2025-04-25 NOTE — PROGRESS NOTE ADULT - PROBLEM SELECTOR PLAN 5
PAS while in bed    4/24 D/w pt family at bedside who notes that pt has been confused and agitated in the past with alcohol withdrawal at the end of last year    Hold on labs draws unless necessary as pt is ultrasound guided and at this point would need midline. We will try to avoid

## 2025-04-26 ENCOUNTER — TRANSCRIPTION ENCOUNTER (OUTPATIENT)
Age: 60
End: 2025-04-26

## 2025-04-26 PROCEDURE — 99233 SBSQ HOSP IP/OBS HIGH 50: CPT

## 2025-04-26 RX ORDER — ACETAMINOPHEN 500 MG/5ML
1000 LIQUID (ML) ORAL ONCE
Refills: 0 | Status: COMPLETED | OUTPATIENT
Start: 2025-04-26 | End: 2025-04-26

## 2025-04-26 RX ORDER — FERROUS SULFATE 137(45) MG
325 TABLET, EXTENDED RELEASE ORAL
Refills: 0 | Status: DISCONTINUED | OUTPATIENT
Start: 2025-04-26 | End: 2025-04-27

## 2025-04-26 RX ADMIN — Medication 400 MILLIGRAM(S): at 01:04

## 2025-04-26 RX ADMIN — FOLIC ACID 1 MILLIGRAM(S): 1 TABLET ORAL at 12:03

## 2025-04-26 RX ADMIN — Medication 1 TABLET(S): at 12:03

## 2025-04-26 RX ADMIN — Medication 0.5 MILLIGRAM(S): at 17:19

## 2025-04-26 RX ADMIN — LACTULOSE 20 GRAM(S): 10 SOLUTION ORAL at 17:18

## 2025-04-26 RX ADMIN — Medication 0.5 MILLIGRAM(S): at 12:03

## 2025-04-26 RX ADMIN — Medication 1 MILLIGRAM(S): at 05:46

## 2025-04-26 RX ADMIN — LACTULOSE 20 GRAM(S): 10 SOLUTION ORAL at 05:46

## 2025-04-26 RX ADMIN — Medication 100 MILLIGRAM(S): at 12:03

## 2025-04-26 RX ADMIN — Medication 40 MILLIGRAM(S): at 05:46

## 2025-04-26 NOTE — PROGRESS NOTE ADULT - SUBJECTIVE AND OBJECTIVE BOX
[facility] Division of Hospital Medicine  Katiuska Hunt MD  Available via MS Teams      SUBJECTIVE / OVERNIGHT EVENTS: AXEL    ADDITIONAL REVIEW OF SYSTEMS:     MEDICATIONS  (STANDING):  folic acid 1 milliGRAM(s) Oral daily  influenza   Vaccine 0.5 milliLiter(s) IntraMuscular once  lactulose Syrup 20 Gram(s) Oral two times a day  LORazepam     Tablet   Oral   LORazepam     Tablet 0.5 milliGRAM(s) Oral every 6 hours  multivitamin 1 Tablet(s) Oral daily  pantoprazole    Tablet 40 milliGRAM(s) Oral before breakfast  sodium chloride 0.9%. 1000 milliLiter(s) (125 mL/Hr) IV Continuous <Continuous>  thiamine 100 milliGRAM(s) Oral daily    MEDICATIONS  (PRN):  LORazepam     Tablet 2 milliGRAM(s) Oral every 1 hour PRN CIWA 8-14  LORazepam   Injectable 2 milliGRAM(s) IV Push every 1 hour PRN CIWA 15 or greater, or seizure  ondansetron Injectable 4 milliGRAM(s) IV Push every 8 hours PRN Nausea and/or Vomiting      I&O's Summary    25 Apr 2025 07:01  -  26 Apr 2025 07:00  --------------------------------------------------------  IN: 660 mL / OUT: 0 mL / NET: 660 mL    26 Apr 2025 07:01  -  26 Apr 2025 14:38  --------------------------------------------------------  IN: 326 mL / OUT: 0 mL / NET: 326 mL        PHYSICAL EXAM:  Vital Signs Last 24 Hrs  T(C): 36.2 (26 Apr 2025 12:17), Max: 36.8 (25 Apr 2025 20:10)  T(F): 97.2 (26 Apr 2025 12:17), Max: 98.3 (25 Apr 2025 20:10)  HR: 60 (26 Apr 2025 12:17) (56 - 78)  BP: 146/81 (26 Apr 2025 12:17) (125/78 - 159/94)  BP(mean): --  RR: 18 (26 Apr 2025 12:17) (18 - 18)  SpO2: 100% (26 Apr 2025 12:17) (96% - 100%)    Parameters below as of 26 Apr 2025 12:17  Patient On (Oxygen Delivery Method): room air      CONSTITUTIONAL: Laying in bed  RESPIRATORY: Normal respiratory effort      LABS:                        9.5    2.29  )-----------( 135      ( 25 Apr 2025 10:38 )             31.3     04-25    137  |  108  |  9   ----------------------------<  128[H]  3.7   |  16[L]  |  0.81    Ca    8.9      25 Apr 2025 10:38  Phos  2.9     04-25  Mg     1.7     04-25    TPro  7.2  /  Alb  3.5  /  TBili  1.0  /  DBili  x   /  AST  69[H]  /  ALT  68[H]  /  AlkPhos  94  04-25    PT/INR - ( 25 Apr 2025 10:38 )   PT: 14.1 sec;   INR: 1.23 ratio               Urinalysis Basic - ( 25 Apr 2025 10:38 )    Color: x / Appearance: x / SG: x / pH: x  Gluc: 128 mg/dL / Ketone: x  / Bili: x / Urobili: x   Blood: x / Protein: x / Nitrite: x   Leuk Esterase: x / RBC: x / WBC x   Sq Epi: x / Non Sq Epi: x / Bacteria: x            RADIOLOGY & ADDITIONAL TESTS:  New Imaging Personally Reviewed Today:  New Electrocardiogram Personally Reviewed Today:  Other Results Reviewed Today:   Prior or Outpatient Records Reviewed Today with Summary:    COORDINATION OF CARE:  Consultant Communication and Details of Discussion (where applicable):

## 2025-04-26 NOTE — DISCHARGE NOTE PROVIDER - ATTENDING DISCHARGE PHYSICAL EXAMINATION:
CONSTITUTIONAL: Laying in bed  RESPIRATORY: Normal respiratory effort  CV: No LE edema  SKIN: no noted rashes

## 2025-04-26 NOTE — DISCHARGE NOTE PROVIDER - NSDCFUSCHEDAPPT_GEN_ALL_CORE_FT
Reed Gray  [facility]  HEPATOLOGY [address] [place] [address]   Scheduled Appointment: 04/30/2025     [Negative] : Genitourinary

## 2025-04-26 NOTE — PROGRESS NOTE ADULT - PROBLEM SELECTOR PLAN 2
- zofran and ativan prn and aspiration precaution

## 2025-04-26 NOTE — DISCHARGE NOTE PROVIDER - NSDCMRMEDTOKEN_GEN_ALL_CORE_FT
magnesium oxide 400 mg oral tablet: 1 tab(s) orally 2 times a day (with meals)  rifAXIMin 550 mg oral tablet: 1 tab(s) orally 2 times a day   folic acid 1 mg oral tablet: 1 tab(s) orally once a day  lactulose 10 g/15 mL oral syrup: 30 milliliter(s) orally 2 times a day  magnesium oxide 400 mg oral tablet: 1 tab(s) orally 2 times a day (with meals)  Multiple Vitamins oral tablet: 1 tab(s) orally once a day  rifAXIMin 550 mg oral tablet: 1 tab(s) orally 2 times a day  thiamine 100 mg oral tablet: 1 tab(s) orally once a day   ferrous sulfate 325 mg (65 mg elemental iron) oral tablet: 1 tab(s) orally every other day Mon 6am, Wed 6 am, Fri 6am  folic acid 1 mg oral tablet: 1 tab(s) orally once a day  lactulose 10 g/15 mL oral syrup: 30 milliliter(s) orally 2 times a day up to 2-3 BMs per day  Multiple Vitamins oral tablet: 1 tab(s) orally once a day  pantoprazole 40 mg oral delayed release tablet: 1 tab(s) orally once a day (before a meal)  rifAXIMin 550 mg oral tablet: 1 tab(s) orally 2 times a day  thiamine 100 mg oral tablet: 1 tab(s) orally once a day

## 2025-04-26 NOTE — DISCHARGE NOTE PROVIDER - NSDCFUADDAPPT_GEN_ALL_CORE_FT
APPTS ARE READY TO BE MADE: [X] YES    Best Family or Patient Contact (if needed): Eleni #[phone]     Additional Information about above appointments (if needed):    1: You have been scheduled for a Hepatology (liver doctor) follow up with Dr. Gray on 04/20/25.  2: Establish primary care provider. You can also follow up with the [facility] or the General Internal Medicine Clinic  3: Behavioral Health    Other comments or requests:    APPTS ARE READY TO BE MADE: [X] YES    Best Family or Patient Contact (if needed): Eleni #[phone]     Additional Information about above appointments (if needed):    1: You have been scheduled for a Hepatology (liver doctor) follow up with Dr. Gray on 04/20/25.  2: Establish primary care provider. You can also follow up with the [facility] or the General Internal Medicine Clinic  3: Behavioral Health    Other comments or requests:     Prior to outreaching the patient, it was visible that the patient has secured a follow up appointment which was not scheduled by our team. Patient was scheduled with Dr. Gray on 4/30 at [address] [place] [address]    Patient was outreached but did not answer. A voicemail was left for the patient to return our call.   APPTS ARE READY TO BE MADE: [X] YES    Best Family or Patient Contact (if needed): Eleni #[phone]     Additional Information about above appointments (if needed):    1: You have been scheduled for a Hepatology (liver doctor) follow up with Dr. Gray on 04/20/25.  2: Establish primary care provider. You can also follow up with the [facility] or the General Internal Medicine Clinic  3: Behavioral Health    Other comments or requests:     Prior to outreaching the patient, it was visible that the patient has secured a follow up appointment which was not scheduled by our team. Patient was scheduled with Dr. Gray on 4/30 at [address] [place] [address]    Patient informed us they already have secured a follow up appointment which is not visible on [facility] and declined to provide appointment details.   Provided patient with provider referral information, however patient prefers to schedule the appointments on their own.

## 2025-04-26 NOTE — DISCHARGE NOTE PROVIDER - NSFOLLOWUPCLINICS_GEN_ALL_ED_FT
[facility] - Primary Care  Primary Care  [address] [facility] [place] [address]  Phone: [phone]  Fax:   Follow Up Time: 1 week    [facility] General Internal Medicine  General Internal Medicine  [address] [place] [address]  Phone: [phone]  Fax:   Follow Up Time: 1 week    [facility] Psych Dept of Substance Abuse  Psychiatry Substance Abuse  [address] [place] [address]  Phone: [phone]  Fax:

## 2025-04-26 NOTE — PROGRESS NOTE ADULT - TIME BILLING
- Ordering, reviewing, and interpreting labs  - Independently obtaining a review of systems and performing a physical exam
- Ordering, reviewing, and interpreting labs, testing  - Independently obtaining a review of systems and performing a physical exam  - Counselling and educating family regarding interpretation of aforementioned items and plan of care.
- Independently obtaining a review of systems and performing a physical exam  - Reviewing labs

## 2025-04-26 NOTE — PROGRESS NOTE ADULT - PROBLEM SELECTOR PLAN 3
- Overall trend down in hgb , no signs of hematemesis or clinical sings of bleeding, monitor with qd cbc  - RANJAN- c/w PO qod iron
- Overall trend down in hgb , no signs of hematemesis or clinical sings of bleeding, monitor with qd cbc  - anemia labs pending
- Overall trend down in hgb , no signs of hematemesis or clinical sings of bleeding, monitor with qd cbc  - anemia labs pending

## 2025-04-26 NOTE — PROGRESS NOTE ADULT - PROBLEM SELECTOR PLAN 1
Received Valium 10mg iv x3 (2am, 4am and 5am) last CIWA 14, alcohol level 138 on presentation  - CIWA monitor and  Ativan taper with prn  - aspiration precaution  - c/w thiamine folic acid  - sinus tachycardia on EKG likely due to withdraw
Received Valium 10mg iv x3 (2am, 4am and 5am) last CIWA 14, alcohol level 138 on presentation  - CIWA monitor and  Ativan taper with prn , end date 4/27  - aspiration precaution  - c/w thiamine folic acid  - sinus tachycardia on EKG likely due to withdrawl
Received Valium 10mg iv x3 (2am, 4am and 5am) last CIWA 14, alcohol level 138 on presentation  - CIWA monitor and  Ativan taper with prn  - aspiration precaution  - c/w thiamine folic acid  - unclear indication for telemetry, sinus tachycardia on EKG likely due to withdraw

## 2025-04-26 NOTE — PHYSICAL THERAPY INITIAL EVALUATION ADULT - ADDITIONAL COMMENTS
As per the pt, he lives in a PH with wife, 2-3STE, stated he was IND with all mobility/ADls w/o using any ADs. pt has RW but doesn't use.

## 2025-04-26 NOTE — PROGRESS NOTE ADULT - PROBLEM SELECTOR PLAN 4
history of ascites s/p large volume paracentesis, lost of follow up with Hepatology outpatient   - will continue Lactulose to 2-3BM daily  - no signs of hepatic encephalopathy at this time  - monitor thrombocytopenia with daily cbc
history of ascites s/p large volume paracentesis, lost of follow up with Hepatology outpatient   - will continue Lactulose to 2-3BM daily  - no signs of hepatic encephalopathy at this time  - monitor thrombocytopenia with daily cbc
history of ascites s/p large volume paracentesis, lost of follow up with Hepatology outpatient   - will continue Lactulose to 2-3BM daily  - no signs of hepatic encephalopathy at this time  - OP hepatology follow up

## 2025-04-26 NOTE — PROGRESS NOTE ADULT - ASSESSMENT
60 year old male with PMHx chronic EtOH abuse with prior withdrawals with delirium tremors and previously requiring intubation due to hypoxia, alcoholic cirrhosis, and gastritis/PUD/Jacque Henning tear presents with vomiting admitted with alcohol withdraw.

## 2025-04-26 NOTE — DISCHARGE NOTE PROVIDER - NSDCCPCAREPLAN_GEN_ALL_CORE_FT
PRINCIPAL DISCHARGE DIAGNOSIS  Diagnosis: Alcohol use, unspecified with withdrawal, unspecified  Assessment and Plan of Treatment: Continue taking multivitamin and thiamine supplement. Continue Lactulose to ensure you are having regular bowel movements.  Treatment for alcohol dependence and withdrawal includes medicines, detoxification, and therapy. Diagnosing and treating alcohol dependence and withdrawal as soon as possible may relieve or prevent symptoms. With treatment and care, your alcohol dependence and withdrawal may be controlled, and your quality of life improved.  Keep all follow up appointments. Follow up with Hepatology Dr. rGay at your already-scheduled appointment on 04/30/25. Establish Primary Care Provider. Follow up with [facility] for help with alcohol cessation.      SECONDARY DISCHARGE DIAGNOSES  Diagnosis: Anemia  Assessment and Plan of Treatment: Continue oral iron supplements. Follow up with Primary Care 1 week after discharge for repeat blood count monitoring  Notify your doctor immediately if you experience abnormal bleeding.  Avoid overuse of NSAIDs (aspirin, Ibuprofen, Advil, Motrin, and Aleve) unless instructed to do so by your doctor.  Signs of worsening anemia include dizziness, lightheadedness, difficulty concentrating, chest pain, palpitations, and shortness of breath.  If you experience these symptoms call your doctor or call an ambulance to take you to the emergency room.

## 2025-04-26 NOTE — DISCHARGE NOTE PROVIDER - HOSPITAL COURSE
HPI:  Unable to obtain history as patient is upset due to loosing his phone in ED, will not give further information besides asking to look for his phone.  Hx mainly obtained from ED documentations and chart review  60 year old male with PMHx chronic EtOH abuse with prior withdrawals with delirium tremors and previously requiring intubation due to hypoxia, alcoholic cirrhosis, and gastritis/PUD/Jacque Kostas tear presents with vomiting per documentation.  No contact number listed for family collaterals.  He was recently hospitalized (4/19-4/22) for alcohol intoxication and withdraw at [facility], treated with Valium prn due to Ativan shortage.  Currently, alert and agitated looking for his phone (23 Apr 2025 05:53)      Hospital Course:  Patient received IV valium as needed, then managed on CIWA protocol and lorazepam taper. EKG on admission showed sinus tachycardia, likely in setting of alcohol withdrawal, resolved. Home Lactulose continued, thiamine and iron supplementation administered. Completed lorazepam taper by the time of discharge. PT evaluated patient and determined no skilled PT needs. Discharge plan with medication reconciliation discussed with attending . ___________________. Patient medically cleared for discharge home with outpatient follow up.    Important/Active Issues for Follow-Up:  Alcohol withdrawal- Behavioral health, Primary Care  Anemia- Primary Care    Medication Changes and Reason:  ******************    Advance Directives  [x] Full code [ ] Hospice [ ] DNR    Discharge Diagnoses:  alcohol use with withdrawal  anemia   HPI:  Unable to obtain history as patient is upset due to loosing his phone in ED, will not give further information besides asking to look for his phone.  Hx mainly obtained from ED documentations and chart review  60 year old male with PMHx chronic EtOH abuse with prior withdrawals with delirium tremors and previously requiring intubation due to hypoxia, alcoholic cirrhosis, and gastritis/PUD/Jacque Kostas tear presents with vomiting per documentation.  No contact number listed for family collaterals.  He was recently hospitalized (4/19-4/22) for alcohol intoxication and withdraw at [facility], treated with Valium prn due to Ativan shortage.  Currently, alert and agitated looking for his phone (23 Apr 2025 05:53)      Hospital Course:  Patient received IV valium as needed, then managed on CIWA protocol and lorazepam taper. EKG on admission showed sinus tachycardia, likely in setting of alcohol withdrawal, resolved. Home Lactulose continued, thiamine and iron supplementation administered. Completed lorazepam taper by the time of discharge. PT evaluated patient and determined no skilled PT needs. Discharge plan with medication reconciliation discussed with attending. Patient medically cleared for discharge home with outpatient follow up.    Important/Active Issues for Follow-Up:  Alcohol withdrawal- Behavioral health, Primary Care  Anemia- Primary Care    Medication Changes and Reason:  See Med Rec    Advance Directives  [x] Full code [ ] Hospice [ ] DNR    Discharge Diagnoses:  alcohol use with withdrawal  anemia   HPI:  Unable to obtain history as patient is upset due to loosing his phone in ED, will not give further information besides asking to look for his phone.  Hx mainly obtained from ED documentations and chart review  60 year old male with PMHx chronic EtOH abuse with prior withdrawals with delirium tremors and previously requiring intubation due to hypoxia, alcoholic cirrhosis, and gastritis/PUD/Jacque Kostas tear presents with vomiting per documentation.  No contact number listed for family collaterals.  He was recently hospitalized (4/19-4/22) for alcohol intoxication and withdraw at [facility], treated with Valium prn due to Ativan shortage.  Currently, alert and agitated looking for his phone (23 Apr 2025 05:53)      Hospital Course:  Patient received IV valium as needed, then managed on CIWA protocol and lorazepam taper. EKG on admission showed sinus tachycardia, likely in setting of alcohol withdrawal, resolved. Home Lactulose continued, thiamine and iron supplementation administered. Completed lorazepam taper by the time of discharge. PT evaluated patient and determined no skilled PT needs. Discharge plan with medication reconciliation discussed with attending. Patient medically cleared for discharge home with outpatient follow up.    Important/Active Issues for Follow-Up:  Alcohol withdrawal- Behavioral health, Primary Care  Anemia- Primary Care- for OP routine colonoscopy    Medication Changes and Reason:  W/w lactulose and iron    Advance Directives  [x] Full code [ ] Hospice [ ] DNR    Discharge Diagnoses:  alcohol use with withdrawal  Iron deficiency anemia

## 2025-04-26 NOTE — CHART NOTE - NSCHARTNOTEFT_GEN_A_CORE
Pt seen at bedside. Slight tremulousness. No noted vomiting or nausea.    Belly nondistended non tender    Alcohol withdrawal  Continue with Ativan taper, thiamine and folic acid  Continue with rifaximin lactulose     D/w pt at bedside
Nutrition Services    Consult received for "MST Score >2" with "Unsure" criteria. Upon chart review, patient with stable weight hx and adequate PO intake in-house, does not currently meet criteria for Protein-Calorie Malnutrition per MST. RD remains available for assessment per protocol or as needed.     Ofe Martin RDN, CDN / TEAMS

## 2025-04-26 NOTE — DISCHARGE NOTE PROVIDER - CARE PROVIDER_API CALL
Reed Gray  Transplant Hepatology  [address] [place] [id number]  Phone: [phone]  Fax: [phone]  Scheduled Appointment: 04/30/2025

## 2025-04-26 NOTE — PHYSICAL THERAPY INITIAL EVALUATION ADULT - PERTINENT HX OF CURRENT PROBLEM, REHAB EVAL
60 year old male with PMHx EtOH abuse (c/b prior withdrawals with delirium tremors and previously requiring intubation due to hypoxia), cirrhosis, and PUD p/w Vomiting associate with diarrhea. CXR: clear.

## 2025-04-27 VITALS
SYSTOLIC BLOOD PRESSURE: 135 MMHG | HEART RATE: 70 BPM | RESPIRATION RATE: 18 BRPM | OXYGEN SATURATION: 99 % | DIASTOLIC BLOOD PRESSURE: 70 MMHG | TEMPERATURE: 98 F

## 2025-04-27 PROCEDURE — 96374 THER/PROPH/DIAG INJ IV PUSH: CPT

## 2025-04-27 PROCEDURE — 83735 ASSAY OF MAGNESIUM: CPT

## 2025-04-27 PROCEDURE — 82140 ASSAY OF AMMONIA: CPT

## 2025-04-27 PROCEDURE — 83550 IRON BINDING TEST: CPT

## 2025-04-27 PROCEDURE — 85610 PROTHROMBIN TIME: CPT

## 2025-04-27 PROCEDURE — 80307 DRUG TEST PRSMV CHEM ANLYZR: CPT

## 2025-04-27 PROCEDURE — 83615 LACTATE (LD) (LDH) ENZYME: CPT

## 2025-04-27 PROCEDURE — 84100 ASSAY OF PHOSPHORUS: CPT

## 2025-04-27 PROCEDURE — 83540 ASSAY OF IRON: CPT

## 2025-04-27 PROCEDURE — 85027 COMPLETE CBC AUTOMATED: CPT

## 2025-04-27 PROCEDURE — 80053 COMPREHEN METABOLIC PANEL: CPT

## 2025-04-27 PROCEDURE — 85045 AUTOMATED RETICULOCYTE COUNT: CPT

## 2025-04-27 PROCEDURE — 99285 EMERGENCY DEPT VISIT HI MDM: CPT

## 2025-04-27 PROCEDURE — 85730 THROMBOPLASTIN TIME PARTIAL: CPT

## 2025-04-27 PROCEDURE — 97161 PT EVAL LOW COMPLEX 20 MIN: CPT

## 2025-04-27 PROCEDURE — 85025 COMPLETE CBC W/AUTO DIFF WBC: CPT

## 2025-04-27 PROCEDURE — 96375 TX/PRO/DX INJ NEW DRUG ADDON: CPT

## 2025-04-27 PROCEDURE — 99239 HOSP IP/OBS DSCHRG MGMT >30: CPT

## 2025-04-27 PROCEDURE — 82728 ASSAY OF FERRITIN: CPT

## 2025-04-27 PROCEDURE — 71045 X-RAY EXAM CHEST 1 VIEW: CPT

## 2025-04-27 RX ORDER — FOLIC ACID 1 MG/1
1 TABLET ORAL
Qty: 30 | Refills: 0
Start: 2025-04-27 | End: 2025-05-26

## 2025-04-27 RX ORDER — FOLIC ACID 1 MG/1
1 TABLET ORAL
Qty: 0 | Refills: 0 | DISCHARGE
Start: 2025-04-27

## 2025-04-27 RX ORDER — FERROUS SULFATE 137(45) MG
1 TABLET, EXTENDED RELEASE ORAL
Qty: 0 | Refills: 0 | DISCHARGE
Start: 2025-04-27

## 2025-04-27 RX ORDER — FERROUS SULFATE 137(45) MG
1 TABLET, EXTENDED RELEASE ORAL
Qty: 15 | Refills: 0
Start: 2025-04-27 | End: 2025-05-26

## 2025-04-27 RX ORDER — LACTULOSE 10 G/15ML
30 SOLUTION ORAL
Qty: 840 | Refills: 0
Start: 2025-04-27 | End: 2025-05-10

## 2025-04-27 RX ORDER — LACTULOSE 10 G/15ML
30 SOLUTION ORAL
Qty: 0 | Refills: 0 | DISCHARGE
Start: 2025-04-27

## 2025-04-27 RX ORDER — B1/B2/B3/B5/B6/B12/VIT C/FOLIC 500-0.5 MG
1 TABLET ORAL
Qty: 0 | Refills: 0 | DISCHARGE
Start: 2025-04-27

## 2025-04-27 RX ADMIN — FOLIC ACID 1 MILLIGRAM(S): 1 TABLET ORAL at 13:19

## 2025-04-27 RX ADMIN — Medication 40 MILLIGRAM(S): at 05:20

## 2025-04-27 RX ADMIN — Medication 0.5 MILLIGRAM(S): at 00:20

## 2025-04-27 RX ADMIN — Medication 100 MILLIGRAM(S): at 13:19

## 2025-04-27 RX ADMIN — Medication 1 TABLET(S): at 13:19

## 2025-04-27 RX ADMIN — LACTULOSE 20 GRAM(S): 10 SOLUTION ORAL at 05:19

## 2025-04-27 RX ADMIN — Medication 0.5 MILLIGRAM(S): at 05:20

## 2025-04-27 NOTE — DISCHARGE NOTE NURSING/CASE MANAGEMENT/SOCIAL WORK - PATIENT PORTAL LINK FT
You can access the FollowMyHealth Patient Portal offered by [facility] by registering at the following website: [email or web address]. By joining [employer]’s FollowMyHealth portal, you will also be able to view your health information using other applications (apps) compatible with our system.

## 2025-04-27 NOTE — DISCHARGE NOTE NURSING/CASE MANAGEMENT/SOCIAL WORK - NSDCPEFALRISK_GEN_ALL_CORE
For information on Fall & Injury Prevention, visit: [email or web address] OR  [email or web address] OR  https://www.cdc.gov/steadi/patient.html

## 2025-04-27 NOTE — DISCHARGE NOTE NURSING/CASE MANAGEMENT/SOCIAL WORK - FINANCIAL ASSISTANCE
[facility] provides services at a reduced cost to those who are determined to be eligible through [facility]’s financial assistance program. Information regarding [facility]’s financial assistance program can be found by going to [email or web address] or by calling [phone].

## 2025-04-27 NOTE — DISCHARGE NOTE NURSING/CASE MANAGEMENT/SOCIAL WORK - NSDCFUADDAPPT_GEN_ALL_CORE_FT
APPTS ARE READY TO BE MADE: [X] YES    Best Family or Patient Contact (if needed): Eleni #[phone]     Additional Information about above appointments (if needed):    1: You have been scheduled for a Hepatology (liver doctor) follow up with Dr. Gray on 04/20/25.  2: Establish primary care provider. You can also follow up with the [facility] or the General Internal Medicine Clinic  3: Behavioral Health    Other comments or requests:

## 2025-04-27 NOTE — DISCHARGE NOTE NURSING/CASE MANAGEMENT/SOCIAL WORK - NSDCVIVACCINE_GEN_ALL_CORE_FT
COVID-19, mRNA, LNP-S, PF, 100 mcg/ 0.5 mL dose (Moderna); 10-Jovan-2021 15:38; Reji Hernandez (RN); Moderna [employer] [id number] (Exp. Date: 13-Jul-2021); IntraMuscular; Deltoid Right.; 0.5 milliLiter(s);   influenza, injectable, quadrivalent, preservative free; 31-Jan-2019 15:53; Ayaka Bynum (RN); [employer]; [id number] (Exp. Date: 30-Jun-2019); IntraMuscular; Deltoid Right.; 0.5 milliLiter(s); VIS (VIS Published: 07-Aug-2015, VIS Presented: 31-Jan-2019);   influenza, injectable, quadrivalent, preservative free; 10-Nov-2019 14:13; Laverne Lane (RN); [employer]; [id number] (Exp. Date: 30-Jun-2020); IntraMuscular; Deltoid Left.; 0.5 milliLiter(s); VIS (VIS Published: 15-Aug-2019, VIS Presented: 10-Nov-2019);   influenza, injectable, quadrivalent, preservative free; 13-Oct-2020 11:56; Kimberli Cronin (RN); [employer]; [id number] (Exp. Date: 30-Jun-2021); IntraMuscular; Deltoid Right.; 0.5 milliLiter(s); VIS (VIS Published: 15-Aug-2019, VIS Presented: 13-Oct-2020);   Pneumococcal conjugate vaccine 20-valent (PCV20), polysaccharide [id number] conjugate, adjuvant, preserv; 04-Oct-2024 21:44; Colby Mckeon (RN); Pf[employer] Inc; [id number] (Exp. Date: 04-May-2025); IntraMuscular; Deltoid Left.; 0.5 milliLiter(s); VIS (VIS Published: 12-May-2023, VIS Presented: 04-Oct-2024);   Td (adult) preservative free; 18-Jan-2020 20:04; Yulia Vance (RN); [employer]; [id number] (Exp. Date: 19-Jan-2021); IntraMuscular; Deltoid Right.; 0.5 milliLiter(s); VIS (VIS Published: 18-Jan-2020, VIS Presented: 18-Jan-2020);

## 2025-04-28 ENCOUNTER — TRANSCRIPTION ENCOUNTER (OUTPATIENT)
Age: 60
End: 2025-04-28

## 2025-04-30 ENCOUNTER — LABORATORY RESULT (OUTPATIENT)
Age: 60
End: 2025-04-30

## 2025-04-30 ENCOUNTER — APPOINTMENT (OUTPATIENT)
Dept: HEPATOLOGY | Facility: CLINIC | Age: 60
End: 2025-04-30

## 2025-04-30 VITALS
OXYGEN SATURATION: 98 % | HEART RATE: 69 BPM | HEIGHT: 66 IN | BODY MASS INDEX: 28.77 KG/M2 | TEMPERATURE: 98.3 F | DIASTOLIC BLOOD PRESSURE: 75 MMHG | RESPIRATION RATE: 16 BRPM | SYSTOLIC BLOOD PRESSURE: 129 MMHG | WEIGHT: 179 LBS

## 2025-04-30 DIAGNOSIS — K70.30 ALCOHOLIC CIRRHOSIS OF LIVER W/OUT ASCITES: ICD-10-CM

## 2025-04-30 LAB
ALBUMIN SERPL ELPH-MCNC: 3.9 G/DL
ALP BLD-CCNC: 88 U/L
ALT SERPL-CCNC: 50 U/L
ANION GAP SERPL CALC-SCNC: 11 MMOL/L
AST SERPL-CCNC: 45 U/L
BASOPHILS # BLD AUTO: 0.02 K/UL
BASOPHILS NFR BLD AUTO: 0.9 %
BILIRUB SERPL-MCNC: 0.4 MG/DL
BUN SERPL-MCNC: 12 MG/DL
CALCIUM SERPL-MCNC: 9.1 MG/DL
CHLORIDE SERPL-SCNC: 110 MMOL/L
CO2 SERPL-SCNC: 19 MMOL/L
CREAT SERPL-MCNC: 0.94 MG/DL
EGFRCR SERPLBLD CKD-EPI 2021: 94 ML/MIN/1.73M2
EOSINOPHIL # BLD AUTO: 0.05 K/UL
EOSINOPHIL NFR BLD AUTO: 2.2 %
GLUCOSE SERPL-MCNC: 91 MG/DL
HCT VFR BLD CALC: 32.3 %
HGB BLD-MCNC: 9.5 G/DL
IMM GRANULOCYTES NFR BLD AUTO: 0.4 %
INR PPP: 1.06 RATIO
LYMPHOCYTES # BLD AUTO: 1.41 K/UL
LYMPHOCYTES NFR BLD AUTO: 62.7 %
MAN DIFF?: NORMAL
MCHC RBC-ENTMCNC: 21.3 PG
MCHC RBC-ENTMCNC: 29.4 G/DL
MCV RBC AUTO: 72.4 FL
MONOCYTES # BLD AUTO: 0.33 K/UL
MONOCYTES NFR BLD AUTO: 14.7 %
NEUTROPHILS # BLD AUTO: 0.43 K/UL
NEUTROPHILS NFR BLD AUTO: 19.1 %
PLATELET # BLD AUTO: 209 K/UL
POTASSIUM SERPL-SCNC: 4.4 MMOL/L
PROT SERPL-MCNC: 7.1 G/DL
PT BLD: 12.5 SEC
RBC # BLD: 4.46 M/UL
RBC # FLD: 22.3 %
SODIUM SERPL-SCNC: 140 MMOL/L
WBC # FLD AUTO: 2.25 K/UL

## 2025-04-30 PROCEDURE — 99215 OFFICE O/P EST HI 40 MIN: CPT | Mod: 25

## 2025-04-30 RX ORDER — ACAMPROSATE CALCIUM 333 MG/1
333 TABLET, DELAYED RELEASE ORAL 3 TIMES DAILY
Qty: 180 | Refills: 3 | Status: DISCONTINUED | COMMUNITY
Start: 2025-04-30 | End: 2025-04-30

## 2025-04-30 RX ORDER — NALTREXONE HYDROCHLORIDE 50 MG/1
50 TABLET, FILM COATED ORAL
Qty: 30 | Refills: 5 | Status: ACTIVE | COMMUNITY
Start: 2025-04-30 | End: 1900-01-01

## 2025-05-03 LAB
PETH 16:0/18:1: >400 NG/ML
PETH 16:0/18:2: >400 NG/ML
PETH COMMENTS: NORMAL

## 2025-05-06 NOTE — ED PROVIDER NOTE - NEUROLOGICAL, MLM
Orders:    Ambulatory Referral to Infectious Disease     Alert and oriented, no focal deficits, no motor or sensory deficits.

## 2025-05-07 NOTE — ED ADULT NURSE NOTE - IS THE PATIENT ABLE TO BE SCREENED?
Detail Level: Generalized Detail Level: Detailed Sunscreen Recommendations: Recommended broad spectrum inorganic or physical sunscreens primarily containing zinc oxide or titanium dioxide. Detail Level: Zone Yes

## 2025-05-12 ENCOUNTER — APPOINTMENT (OUTPATIENT)
Dept: ULTRASOUND IMAGING | Facility: CLINIC | Age: 60
End: 2025-05-12

## 2025-05-12 ENCOUNTER — OUTPATIENT (OUTPATIENT)
Dept: OUTPATIENT SERVICES | Facility: HOSPITAL | Age: 60
LOS: 1 days | End: 2025-05-12
Payer: MEDICAID

## 2025-05-12 DIAGNOSIS — K70.30 ALCOHOLIC CIRRHOSIS OF LIVER WITHOUT ASCITES: ICD-10-CM

## 2025-05-12 PROCEDURE — 93975 VASCULAR STUDY: CPT | Mod: 26

## 2025-05-12 PROCEDURE — 93975 VASCULAR STUDY: CPT

## 2025-05-13 NOTE — ED PROVIDER NOTE - BOWEL SOUNDS
Name of Medication(s) Requested:  Requested Prescriptions     Pending Prescriptions Disp Refills    oxyCODONE-acetaminophen (PERCOCET) 5-325 MG per tablet 42 tablet 0     Sig: Take 1 tablet by mouth every 4 hours as needed for Pain for up to 14 days. Max Daily Amount: 6 tablets       Medication is on current medication list Yes    Dosage and directions were verified? Yes    Quantity verified: 30 day supply     Pharmacy Verified?  Yes    Last Appointment:  2/17/2025    Future appts:  Future Appointments   Date Time Provider Department Center   5/28/2025  2:00 PM Joel Sanchez MD [facility] PAIN MAR [facility]        (If no appt send self scheduling link. .REFILLAPPT)  Scheduling request sent?     [] Yes  [x] No    Does patient need updated?  [] Yes  [x] No  
present x 4 quadrants

## 2025-05-13 NOTE — DISCHARGE NOTE ADULT - NS AS DC AMI YN
[facility]  Breast Center Contacts  To schedule breast imaging call Central Scheduling at [phone]                  Mammography: Option 1  Ultrasound: Option 2, then option 1  CPT Code for WBUS : 18979.   MRI: Option 2, then Option 3  CPT Code: 83088  (at high risk, family history, dense breasts)       Genetic [phone]  [facility]: [phone]  Medical/Oncology: [phone]  Radiation/Oncology: [phone]    Insurance companies may cover all or part of the cost of breast imaging. The breast clinic will work with your insurance to obtain authorization, if needed. You may also call your insurance company to determine if your imaging is a covered benefit (CPT codes listed above). *Remember: coverage does not always mean no cost to you*  -To inquire about the out-of pocket costs for imaging, please contact [employer] at [phone].     no

## 2025-06-02 NOTE — H&P ADULT - PROBLEM SELECTOR PLAN 1
[place] ambulatory encounter  ORTHOPEDIC ESTABLISHED PATIENT    CHIEF COMPLAINT:  Office Visit (Left Shoulder Pain)       SUBJECTIVE:  The patient presents for evaluation of left shoulder pain.    She reports that the Toradol injection administered during her last visit in 04/2025 provided significant relief for a couple of weeks. However, she continues to experience tenderness in her left shoulder, despite not engaging in any heavy lifting activities. The onset of her symptoms was noted in 11/2024. She expresses a desire to have her condition addressed due to the presence of fluid and the associated pain. She also inquires about the possibility of fluid removal.    Her medical history includes a previous rotator cuff repair on the same shoulder, performed several years ago.    PAST SURGICAL HISTORY:  Rotator cuff repair on the left shoulder several years ago.       Medications, tobacco use, and allergies verified by nursing.    Review of systems:    Systems reviewed and negative except as documented in the HPI.    HISTORIES:  Current Outpatient Medications   Medication Sig Dispense Refill   • amoxicillin (AMOXIL) 500 MG capsule Take 4 capsules by mouth as needed (1 hour prior to dental work). Take for 2 years following joint replacement, through 8/10/2024     • lisinopril-hydroCHLOROthiazide (ZESTORETIC) 10-12.5 MG per tablet Take 1 tablet by mouth daily. 90 tablet 3   • amLODIPine (NORVASC) 10 MG tablet Take 1 tablet by mouth daily. 90 tablet 3   • Multiple Vitamins-Minerals (MULTIVITAMIN PO) Take by mouth daily.        No current facility-administered medications for this visit.     ALLERGIES:  No Known Allergies    OBJECTIVE:  PHYSICAL EXAM-    Vitals:   Visit Vitals  LMP 08/17/2017      Constitutional:  Well-developed, well-nourished female in no acute distress.  Skin: Warm, dry, intact without rash or lesion.  No subcutaneous masses.  HEENT:  Normocephalic.  Hearing intact.  Vision intact.  Psych:  Alert &  oriented x 3.  Mood and insight appropriate.  CV:  Pulse is regular.  No significant pitting edema or lymphedema.   Resp:  Respiratory effort within appropriate limits.    Abdomen:  No abnormal distension.  Neuro:  No gross sensory deficits.  Spine:  No gross curvature of spine.  Appropriate mobility without instability.  No gross pelvic obliquity.  Musculoskeletal:  Musculoskeletal:  Left shoulder: 2+ tenderness over the posterior capsule, minimal tenderness over the anterior aspect, active assisted forward elevation 150 degrees, abduction 140 degrees with 2+ pain, external rotation 45 degrees    IMAGING STUDIES:    EXAMINATION: MRI SHOULDER LEFT WO CONTRAST     HISTORY: M25.512 Chronic left shoulder pain     TECHNIQUE: Multisequence, multiplanar imaging is performed without contrast     COMPARISON: X-ray from July 22, 2024     FINDINGS:     Joint fluid: There is a small joint effusion with mild synovitis in the  axillary recess.     Subacromial/subdeltoid bursa: There is a trace amount of fluid present.     Rotator cuff: There is mild supraspinatus and infraspinatus tendinosis with  small partial-thickness tears. There is mild subscapularis tendinosis.  There is no retraction of the myotendinous junction.     Muscles: There is no edema or fatty atrophy.     Rotator interval: Intact     Glenohumeral ligaments: Intact     Labrum: There is a slightly blunted morphology of the superior labrum with  intermediate signal. This may represent a combination of granulation tissue  and degeneration with suspected previous surgical change of labral repair  with fixation anchors in the superior glenoid. There is no definite fluid  signal intensity alteration to suggest a residual or recurrent labral tear.  The posterior and superior labrum is slightly diminutive in size.     Biceps: There is segmental tendinosis with small partial-thickness tear of  the intra-articular biceps. There is mild biceps tenosynovitis.      Acromioclavicular joint: There appear to be changes of subacromial  decompression with subjacent micrometallic artifact and a hypoplastic  appearance of the acromion and portions of the distal clavicle.     Glenohumeral joint: There is mild chondromalacia with osteophyte formation  of the humeral head. The cartilage surfaces demonstrate minimal thinning.     Marrow: Normal        IMPRESSION:          1. Small partial-thickness tears with tendinosis within the infraspinatus  and supraspinatus tendons.     2. Presumed surgical changes of subacromial decompression.     3. Presumed surgical changes of repair of the superior labrum with mild  degeneration.     4. Segmental tendinosis with small partial-thickness tears of the  intra-articular biceps with mild biceps tenosynovitis.     5. Mild glenohumeral joint chondromalacia.    ASSESSMENT/PLAN:      1. Biceps tendinosis, left shoulder.  She has quite a bit of effusion around the biceps and I think this is the primary problem.  She does have some partial-thickness tearing of the supraspinatus and infraspinatus as well:    Left shoulder arthroscopy with debridement, decompression, biceps tenodesis, and possible rotator cuff repair is recommended. The use of a bioinductive implant will be considered if appropriate. Significant scar formation around the previous rotator cuff repair site is anticipated. Risks of surgery, including infection, were discussed, and a dose of antibiotics will be administered at the time of surgery to reduce this risk. The surgery will be performed arthroscopically, involving three small incisions, and is expected to take about an hour. This is a day surgery, and the patient will be able to go home the same day. The goal is to alleviate pain and improve shoulder function. A handout on the surgery will be provided.    Follow-up  Follow up postoperatively.                 - Keep NPO with medicatiosn for now  - IV Zofran 4 mg Q4 hours for 4 doses standing then Q 6 hours PRN  - LR at 125 cc/hr for 24 hours  - Will attempt to advance to clear liquids in am   - will hold off on GI consult for now

## 2025-06-03 ENCOUNTER — INPATIENT (INPATIENT)
Facility: HOSPITAL | Age: 60
LOS: 3 days | Discharge: HOME | End: 2025-06-07
Attending: INTERNAL MEDICINE | Admitting: INTERNAL MEDICINE
Payer: MEDICAID

## 2025-06-03 VITALS
DIASTOLIC BLOOD PRESSURE: 90 MMHG | RESPIRATION RATE: 17 BRPM | HEART RATE: 101 BPM | SYSTOLIC BLOOD PRESSURE: 139 MMHG | TEMPERATURE: 97 F | OXYGEN SATURATION: 98 % | WEIGHT: 179.9 LBS | HEIGHT: 67 IN

## 2025-06-03 LAB
ALBUMIN SERPL ELPH-MCNC: 3.3 G/DL — SIGNIFICANT CHANGE UP (ref 3.3–5)
ALBUMIN SERPL ELPH-MCNC: 3.9 G/DL — SIGNIFICANT CHANGE UP (ref 3.3–5)
ALP SERPL-CCNC: 68 U/L — SIGNIFICANT CHANGE UP (ref 40–120)
ALP SERPL-CCNC: 80 U/L — SIGNIFICANT CHANGE UP (ref 40–120)
ALT FLD-CCNC: 136 U/L — HIGH (ref 12–78)
ALT FLD-CCNC: 158 U/L — HIGH (ref 12–78)
ANION GAP SERPL CALC-SCNC: 10 MMOL/L — SIGNIFICANT CHANGE UP (ref 5–17)
ANION GAP SERPL CALC-SCNC: 15 MMOL/L — SIGNIFICANT CHANGE UP (ref 5–17)
ANISOCYTOSIS BLD QL: SIGNIFICANT CHANGE UP
AST SERPL-CCNC: 255 U/L — HIGH (ref 15–37)
AST SERPL-CCNC: 323 U/L — HIGH (ref 15–37)
BASOPHILS # BLD AUTO: 0 K/UL — SIGNIFICANT CHANGE UP (ref 0–0.2)
BASOPHILS NFR BLD AUTO: 0 % — SIGNIFICANT CHANGE UP (ref 0–2)
BILIRUB DIRECT SERPL-MCNC: 0.3 MG/DL — SIGNIFICANT CHANGE UP (ref 0–0.3)
BILIRUB INDIRECT FLD-MCNC: 0.6 MG/DL — SIGNIFICANT CHANGE UP (ref 0.2–1)
BILIRUB SERPL-MCNC: 0.8 MG/DL — SIGNIFICANT CHANGE UP (ref 0.2–1.2)
BILIRUB SERPL-MCNC: 0.9 MG/DL — SIGNIFICANT CHANGE UP (ref 0.2–1.2)
BUN SERPL-MCNC: 11 MG/DL — SIGNIFICANT CHANGE UP (ref 7–23)
BUN SERPL-MCNC: 8 MG/DL — SIGNIFICANT CHANGE UP (ref 7–23)
CALCIUM SERPL-MCNC: 7.5 MG/DL — LOW (ref 8.5–10.1)
CALCIUM SERPL-MCNC: 8.8 MG/DL — SIGNIFICANT CHANGE UP (ref 8.5–10.1)
CHLORIDE SERPL-SCNC: 106 MMOL/L — SIGNIFICANT CHANGE UP (ref 96–108)
CHLORIDE SERPL-SCNC: 113 MMOL/L — HIGH (ref 96–108)
CK SERPL-CCNC: 1281 U/L — HIGH (ref 26–308)
CK SERPL-CCNC: 1340 U/L — HIGH (ref 26–308)
CO2 SERPL-SCNC: 20 MMOL/L — LOW (ref 22–31)
CO2 SERPL-SCNC: 22 MMOL/L — SIGNIFICANT CHANGE UP (ref 22–31)
CREAT SERPL-MCNC: 0.9 MG/DL — SIGNIFICANT CHANGE UP (ref 0.5–1.3)
CREAT SERPL-MCNC: 1.11 MG/DL — SIGNIFICANT CHANGE UP (ref 0.5–1.3)
EGFR: 77 ML/MIN/1.73M2 — SIGNIFICANT CHANGE UP
EGFR: 77 ML/MIN/1.73M2 — SIGNIFICANT CHANGE UP
EGFR: 99 ML/MIN/1.73M2 — SIGNIFICANT CHANGE UP
EGFR: 99 ML/MIN/1.73M2 — SIGNIFICANT CHANGE UP
ELLIPTOCYTES BLD QL SMEAR: SLIGHT — SIGNIFICANT CHANGE UP
EOSINOPHIL # BLD AUTO: 0 K/UL — SIGNIFICANT CHANGE UP (ref 0–0.5)
EOSINOPHIL NFR BLD AUTO: 0 % — SIGNIFICANT CHANGE UP (ref 0–6)
ETHANOL SERPL-MCNC: 393 MG/DL — HIGH (ref 0–10)
GLUCOSE SERPL-MCNC: 103 MG/DL — HIGH (ref 70–99)
GLUCOSE SERPL-MCNC: 87 MG/DL — SIGNIFICANT CHANGE UP (ref 70–99)
HCT VFR BLD CALC: 33.7 % — LOW (ref 39–50)
HGB BLD-MCNC: 10.5 G/DL — LOW (ref 13–17)
HYPOCHROMIA BLD QL: SIGNIFICANT CHANGE UP
LACTATE SERPL-SCNC: 5.4 MMOL/L — CRITICAL HIGH (ref 0.7–2)
LACTATE SERPL-SCNC: 6.4 MMOL/L — CRITICAL HIGH (ref 0.7–2)
LACTATE SERPL-SCNC: 6.6 MMOL/L — CRITICAL HIGH (ref 0.7–2)
LIDOCAIN IGE QN: 49 U/L — SIGNIFICANT CHANGE UP (ref 13–75)
LYMPHOCYTES # BLD AUTO: 3.02 K/UL — SIGNIFICANT CHANGE UP (ref 1–3.3)
LYMPHOCYTES # BLD AUTO: 60 % — HIGH (ref 13–44)
MAGNESIUM SERPL-MCNC: 1.6 MG/DL — SIGNIFICANT CHANGE UP (ref 1.6–2.6)
MANUAL SMEAR VERIFICATION: SIGNIFICANT CHANGE UP
MCHC RBC-ENTMCNC: 20.5 PG — LOW (ref 27–34)
MCHC RBC-ENTMCNC: 31.2 G/DL — LOW (ref 32–36)
MCV RBC AUTO: 65.7 FL — LOW (ref 80–100)
MICROCYTES BLD QL: SIGNIFICANT CHANGE UP
MONOCYTES # BLD AUTO: 0.25 K/UL — SIGNIFICANT CHANGE UP (ref 0–0.9)
MONOCYTES NFR BLD AUTO: 5 % — SIGNIFICANT CHANGE UP (ref 2–14)
NEUTROPHILS # BLD AUTO: 1.76 K/UL — LOW (ref 1.8–7.4)
NEUTROPHILS NFR BLD AUTO: 35 % — LOW (ref 43–77)
NRBC # BLD: 2 /100 WBCS — HIGH (ref 0–0)
NRBC BLD AUTO-RTO: SIGNIFICANT CHANGE UP /100 WBCS (ref 0–0)
NRBC BLD-RTO: 2 /100 WBCS — HIGH (ref 0–0)
OVALOCYTES BLD QL SMEAR: SLIGHT — SIGNIFICANT CHANGE UP
PHOSPHATE SERPL-MCNC: 2.5 MG/DL — SIGNIFICANT CHANGE UP (ref 2.5–4.5)
PLAT MORPH BLD: NORMAL — SIGNIFICANT CHANGE UP
PLATELET # BLD AUTO: 243 K/UL — SIGNIFICANT CHANGE UP (ref 150–400)
POIKILOCYTOSIS BLD QL AUTO: SIGNIFICANT CHANGE UP
POTASSIUM SERPL-MCNC: 4.1 MMOL/L — SIGNIFICANT CHANGE UP (ref 3.5–5.3)
POTASSIUM SERPL-MCNC: 4.2 MMOL/L — SIGNIFICANT CHANGE UP (ref 3.5–5.3)
POTASSIUM SERPL-SCNC: 4.1 MMOL/L — SIGNIFICANT CHANGE UP (ref 3.5–5.3)
POTASSIUM SERPL-SCNC: 4.2 MMOL/L — SIGNIFICANT CHANGE UP (ref 3.5–5.3)
PROT SERPL-MCNC: 7.2 GM/DL — SIGNIFICANT CHANGE UP (ref 6–8.3)
PROT SERPL-MCNC: 8.6 GM/DL — HIGH (ref 6–8.3)
RBC # BLD: 5.13 M/UL — SIGNIFICANT CHANGE UP (ref 4.2–5.8)
RBC # FLD: 22.5 % — HIGH (ref 10.3–14.5)
RBC BLD AUTO: ABNORMAL
SMUDGE CELLS # BLD: PRESENT — SIGNIFICANT CHANGE UP
SODIUM SERPL-SCNC: 141 MMOL/L — SIGNIFICANT CHANGE UP (ref 135–145)
SODIUM SERPL-SCNC: 145 MMOL/L — SIGNIFICANT CHANGE UP (ref 135–145)
SPHEROCYTES BLD QL SMEAR: SLIGHT — SIGNIFICANT CHANGE UP
TARGETS BLD QL SMEAR: SIGNIFICANT CHANGE UP
TROPONIN I, HIGH SENSITIVITY RESULT: 24.1 NG/L — SIGNIFICANT CHANGE UP
TROPONIN I, HIGH SENSITIVITY RESULT: 33 NG/L — SIGNIFICANT CHANGE UP
WBC # BLD: 5.03 K/UL — SIGNIFICANT CHANGE UP (ref 3.8–10.5)
WBC # FLD AUTO: 5.03 K/UL — SIGNIFICANT CHANGE UP (ref 3.8–10.5)

## 2025-06-03 PROCEDURE — 99285 EMERGENCY DEPT VISIT HI MDM: CPT | Mod: 25

## 2025-06-03 PROCEDURE — 71045 X-RAY EXAM CHEST 1 VIEW: CPT | Mod: 26

## 2025-06-03 PROCEDURE — 36410 VNPNXR 3YR/> PHY/QHP DX/THER: CPT

## 2025-06-03 PROCEDURE — 74177 CT ABD & PELVIS W/CONTRAST: CPT | Mod: 26

## 2025-06-03 PROCEDURE — 99291 CRITICAL CARE FIRST HOUR: CPT

## 2025-06-03 PROCEDURE — 76937 US GUIDE VASCULAR ACCESS: CPT | Mod: 26

## 2025-06-03 RX ORDER — B1/B2/B3/B5/B6/B12/VIT C/FOLIC 500-0.5 MG
1 TABLET ORAL DAILY
Refills: 0 | Status: DISCONTINUED | OUTPATIENT
Start: 2025-06-03 | End: 2025-06-07

## 2025-06-03 RX ORDER — DIAZEPAM 5 MG/1
10 TABLET ORAL ONCE
Refills: 0 | Status: DISCONTINUED | OUTPATIENT
Start: 2025-06-03 | End: 2025-06-03

## 2025-06-03 RX ORDER — LORAZEPAM 4 MG/ML
VIAL (ML) INJECTION
Refills: 0 | Status: DISCONTINUED | OUTPATIENT
Start: 2025-06-03 | End: 2025-06-03

## 2025-06-03 RX ORDER — MAGNESIUM SULFATE 500 MG/ML
1 SYRINGE (ML) INJECTION ONCE
Refills: 0 | Status: COMPLETED | OUTPATIENT
Start: 2025-06-03 | End: 2025-06-03

## 2025-06-03 RX ORDER — LACTULOSE 10 G/15ML
20 SOLUTION ORAL
Refills: 0 | Status: DISCONTINUED | OUTPATIENT
Start: 2025-06-03 | End: 2025-06-07

## 2025-06-03 RX ORDER — ONDANSETRON HCL/PF 4 MG/2 ML
4 VIAL (ML) INJECTION EVERY 8 HOURS
Refills: 0 | Status: DISCONTINUED | OUTPATIENT
Start: 2025-06-03 | End: 2025-06-07

## 2025-06-03 RX ORDER — SIMETHICONE 80 MG
80 TABLET,CHEWABLE ORAL EVERY 8 HOURS
Refills: 0 | Status: DISCONTINUED | OUTPATIENT
Start: 2025-06-03 | End: 2025-06-07

## 2025-06-03 RX ORDER — LORAZEPAM 4 MG/ML
2 VIAL (ML) INJECTION EVERY 6 HOURS
Refills: 0 | Status: DISCONTINUED | OUTPATIENT
Start: 2025-06-03 | End: 2025-06-03

## 2025-06-03 RX ORDER — LIDOCAINE HYDROCHLORIDE 20 MG/ML
10 JELLY TOPICAL ONCE
Refills: 0 | Status: COMPLETED | OUTPATIENT
Start: 2025-06-03 | End: 2025-06-03

## 2025-06-03 RX ORDER — DIAZEPAM 5 MG/1
20 TABLET ORAL ONCE
Refills: 0 | Status: DISCONTINUED | OUTPATIENT
Start: 2025-06-03 | End: 2025-06-03

## 2025-06-03 RX ORDER — DIAZEPAM 5 MG/1
10 TABLET ORAL EVERY 4 HOURS
Refills: 0 | Status: DISCONTINUED | OUTPATIENT
Start: 2025-06-03 | End: 2025-06-05

## 2025-06-03 RX ORDER — DIAZEPAM 5 MG/1
TABLET ORAL
Refills: 0 | Status: DISCONTINUED | OUTPATIENT
Start: 2025-06-03 | End: 2025-06-06

## 2025-06-03 RX ORDER — DIAZEPAM 5 MG/1
10 TABLET ORAL
Refills: 0 | Status: DISCONTINUED | OUTPATIENT
Start: 2025-06-03 | End: 2025-06-03

## 2025-06-03 RX ORDER — SODIUM CHLORIDE 9 G/1000ML
1000 INJECTION, SOLUTION INTRAVENOUS ONCE
Refills: 0 | Status: COMPLETED | OUTPATIENT
Start: 2025-06-03 | End: 2025-06-03

## 2025-06-03 RX ORDER — SODIUM CHLORIDE 9 G/1000ML
1000 INJECTION, SOLUTION INTRAVENOUS
Refills: 0 | Status: DISCONTINUED | OUTPATIENT
Start: 2025-06-03 | End: 2025-06-05

## 2025-06-03 RX ORDER — ONDANSETRON HCL/PF 4 MG/2 ML
4 VIAL (ML) INJECTION ONCE
Refills: 0 | Status: COMPLETED | OUTPATIENT
Start: 2025-06-03 | End: 2025-06-03

## 2025-06-03 RX ORDER — FERROUS SULFATE 137(45) MG
325 TABLET, EXTENDED RELEASE ORAL DAILY
Refills: 0 | Status: DISCONTINUED | OUTPATIENT
Start: 2025-06-03 | End: 2025-06-07

## 2025-06-03 RX ORDER — DIAZEPAM 5 MG/1
10 TABLET ORAL EVERY 6 HOURS
Refills: 0 | Status: DISCONTINUED | OUTPATIENT
Start: 2025-06-06 | End: 2025-06-06

## 2025-06-03 RX ORDER — FOLIC ACID 1 MG/1
1 TABLET ORAL DAILY
Refills: 0 | Status: DISCONTINUED | OUTPATIENT
Start: 2025-06-03 | End: 2025-06-07

## 2025-06-03 RX ORDER — PHENOBARBITAL 30 MG/1
62.5 TABLET ORAL
Refills: 0 | Status: DISCONTINUED | OUTPATIENT
Start: 2025-06-03 | End: 2025-06-04

## 2025-06-03 RX ORDER — DIAZEPAM 5 MG/1
10 TABLET ORAL
Refills: 0 | Status: DISCONTINUED | OUTPATIENT
Start: 2025-06-03 | End: 2025-06-07

## 2025-06-03 RX ORDER — MAGNESIUM, ALUMINUM HYDROXIDE 200-200 MG
30 TABLET,CHEWABLE ORAL ONCE
Refills: 0 | Status: COMPLETED | OUTPATIENT
Start: 2025-06-03 | End: 2025-06-03

## 2025-06-03 RX ADMIN — Medication 4 MILLIGRAM(S): at 11:58

## 2025-06-03 RX ADMIN — DIAZEPAM 10 MILLIGRAM(S): 5 TABLET ORAL at 18:02

## 2025-06-03 RX ADMIN — DIAZEPAM 10 MILLIGRAM(S): 5 TABLET ORAL at 16:34

## 2025-06-03 RX ADMIN — Medication 1000 MILLILITER(S): at 23:46

## 2025-06-03 RX ADMIN — Medication 100 MILLIGRAM(S): at 17:45

## 2025-06-03 RX ADMIN — DIAZEPAM 10 MILLIGRAM(S): 5 TABLET ORAL at 21:29

## 2025-06-03 RX ADMIN — Medication 1000 MILLILITER(S): at 07:21

## 2025-06-03 RX ADMIN — Medication 100 GRAM(S): at 23:49

## 2025-06-03 RX ADMIN — SODIUM CHLORIDE 125 MILLILITER(S): 9 INJECTION, SOLUTION INTRAVENOUS at 23:50

## 2025-06-03 RX ADMIN — Medication 4 MILLIGRAM(S): at 05:36

## 2025-06-03 RX ADMIN — Medication 1000 MILLILITER(S): at 05:33

## 2025-06-03 RX ADMIN — DIAZEPAM 20 MILLIGRAM(S): 5 TABLET ORAL at 23:27

## 2025-06-03 RX ADMIN — SODIUM CHLORIDE 1000 MILLILITER(S): 9 INJECTION, SOLUTION INTRAVENOUS at 20:29

## 2025-06-03 RX ADMIN — DIAZEPAM 10 MILLIGRAM(S): 5 TABLET ORAL at 13:31

## 2025-06-03 RX ADMIN — DIAZEPAM 10 MILLIGRAM(S): 5 TABLET ORAL at 19:42

## 2025-06-03 RX ADMIN — Medication 4 MILLIGRAM(S): at 12:48

## 2025-06-03 RX ADMIN — LACTULOSE 20 GRAM(S): 10 SOLUTION ORAL at 17:47

## 2025-06-03 RX ADMIN — DIAZEPAM 10 MILLIGRAM(S): 5 TABLET ORAL at 22:47

## 2025-06-03 RX ADMIN — Medication 4 MILLIGRAM(S): at 23:31

## 2025-06-03 RX ADMIN — Medication 4 MILLIGRAM(S): at 05:48

## 2025-06-03 RX ADMIN — Medication 20 MILLIGRAM(S): at 05:37

## 2025-06-04 LAB
ANION GAP SERPL CALC-SCNC: 7 MMOL/L — SIGNIFICANT CHANGE UP (ref 5–17)
ANION GAP SERPL CALC-SCNC: 9 MMOL/L — SIGNIFICANT CHANGE UP (ref 5–17)
BASOPHILS # BLD AUTO: 0.02 K/UL — SIGNIFICANT CHANGE UP (ref 0–0.2)
BASOPHILS NFR BLD AUTO: 0.7 % — SIGNIFICANT CHANGE UP (ref 0–2)
BUN SERPL-MCNC: 6 MG/DL — LOW (ref 7–23)
BUN SERPL-MCNC: 9 MG/DL — SIGNIFICANT CHANGE UP (ref 7–23)
CALCIUM SERPL-MCNC: 8 MG/DL — LOW (ref 8.5–10.1)
CALCIUM SERPL-MCNC: 8.1 MG/DL — LOW (ref 8.5–10.1)
CHLORIDE SERPL-SCNC: 110 MMOL/L — HIGH (ref 96–108)
CHLORIDE SERPL-SCNC: 111 MMOL/L — HIGH (ref 96–108)
CO2 SERPL-SCNC: 24 MMOL/L — SIGNIFICANT CHANGE UP (ref 22–31)
CO2 SERPL-SCNC: 26 MMOL/L — SIGNIFICANT CHANGE UP (ref 22–31)
CREAT SERPL-MCNC: 1.04 MG/DL — SIGNIFICANT CHANGE UP (ref 0.5–1.3)
CREAT SERPL-MCNC: 1.07 MG/DL — SIGNIFICANT CHANGE UP (ref 0.5–1.3)
EGFR: 80 ML/MIN/1.73M2 — SIGNIFICANT CHANGE UP
EGFR: 80 ML/MIN/1.73M2 — SIGNIFICANT CHANGE UP
EGFR: 83 ML/MIN/1.73M2 — SIGNIFICANT CHANGE UP
EGFR: 83 ML/MIN/1.73M2 — SIGNIFICANT CHANGE UP
EOSINOPHIL # BLD AUTO: 0.17 K/UL — SIGNIFICANT CHANGE UP (ref 0–0.5)
EOSINOPHIL NFR BLD AUTO: 5.6 % — SIGNIFICANT CHANGE UP (ref 0–6)
FLUAV AG NPH QL: SIGNIFICANT CHANGE UP
FLUBV AG NPH QL: SIGNIFICANT CHANGE UP
GLUCOSE SERPL-MCNC: 112 MG/DL — HIGH (ref 70–99)
GLUCOSE SERPL-MCNC: 117 MG/DL — HIGH (ref 70–99)
HCT VFR BLD CALC: 27.8 % — LOW (ref 39–50)
HGB BLD-MCNC: 8.5 G/DL — LOW (ref 13–17)
IMM GRANULOCYTES NFR BLD AUTO: 0.3 % — SIGNIFICANT CHANGE UP (ref 0–0.9)
LACTATE SERPL-SCNC: 1.9 MMOL/L — SIGNIFICANT CHANGE UP (ref 0.7–2)
LACTATE SERPL-SCNC: 2.3 MMOL/L — HIGH (ref 0.7–2)
LYMPHOCYTES # BLD AUTO: 1.16 K/UL — SIGNIFICANT CHANGE UP (ref 1–3.3)
LYMPHOCYTES # BLD AUTO: 38.3 % — SIGNIFICANT CHANGE UP (ref 13–44)
MAGNESIUM SERPL-MCNC: 1.7 MG/DL — SIGNIFICANT CHANGE UP (ref 1.6–2.6)
MAGNESIUM SERPL-MCNC: 2 MG/DL — SIGNIFICANT CHANGE UP (ref 1.6–2.6)
MCHC RBC-ENTMCNC: 20.6 PG — LOW (ref 27–34)
MCHC RBC-ENTMCNC: 30.6 G/DL — LOW (ref 32–36)
MCV RBC AUTO: 67.5 FL — LOW (ref 80–100)
MONOCYTES # BLD AUTO: 0.34 K/UL — SIGNIFICANT CHANGE UP (ref 0–0.9)
MONOCYTES NFR BLD AUTO: 11.2 % — SIGNIFICANT CHANGE UP (ref 2–14)
NEUTROPHILS # BLD AUTO: 1.33 K/UL — LOW (ref 1.8–7.4)
NEUTROPHILS NFR BLD AUTO: 43.9 % — SIGNIFICANT CHANGE UP (ref 43–77)
NRBC BLD AUTO-RTO: 1 /100 WBCS — HIGH (ref 0–0)
PHOSPHATE SERPL-MCNC: 1.3 MG/DL — LOW (ref 2.5–4.5)
PHOSPHATE SERPL-MCNC: 1.3 MG/DL — LOW (ref 2.5–4.5)
PLATELET # BLD AUTO: 150 K/UL — SIGNIFICANT CHANGE UP (ref 150–400)
POTASSIUM SERPL-MCNC: 3.4 MMOL/L — LOW (ref 3.5–5.3)
POTASSIUM SERPL-MCNC: 3.9 MMOL/L — SIGNIFICANT CHANGE UP (ref 3.5–5.3)
POTASSIUM SERPL-SCNC: 3.4 MMOL/L — LOW (ref 3.5–5.3)
POTASSIUM SERPL-SCNC: 3.9 MMOL/L — SIGNIFICANT CHANGE UP (ref 3.5–5.3)
RBC # BLD: 4.12 M/UL — LOW (ref 4.2–5.8)
RBC # FLD: 22.3 % — HIGH (ref 10.3–14.5)
RSV RNA NPH QL NAA+NON-PROBE: SIGNIFICANT CHANGE UP
SARS-COV-2 RNA SPEC QL NAA+PROBE: SIGNIFICANT CHANGE UP
SODIUM SERPL-SCNC: 143 MMOL/L — SIGNIFICANT CHANGE UP (ref 135–145)
SODIUM SERPL-SCNC: 144 MMOL/L — SIGNIFICANT CHANGE UP (ref 135–145)
SOURCE RESPIRATORY: SIGNIFICANT CHANGE UP
WBC # BLD: 3.03 K/UL — LOW (ref 3.8–10.5)
WBC # FLD AUTO: 3.03 K/UL — LOW (ref 3.8–10.5)

## 2025-06-04 PROCEDURE — 93010 ELECTROCARDIOGRAM REPORT: CPT

## 2025-06-04 PROCEDURE — 71045 X-RAY EXAM CHEST 1 VIEW: CPT | Mod: 26

## 2025-06-04 PROCEDURE — 99232 SBSQ HOSP IP/OBS MODERATE 35: CPT | Mod: 25

## 2025-06-04 PROCEDURE — 99233 SBSQ HOSP IP/OBS HIGH 50: CPT

## 2025-06-04 RX ORDER — PHENOBARBITAL 30 MG/1
62.5 TABLET ORAL
Refills: 0 | Status: DISCONTINUED | OUTPATIENT
Start: 2025-06-04 | End: 2025-06-07

## 2025-06-04 RX ORDER — SOD PHOS DI, MONO/K PHOS MONO 250 MG
2 TABLET ORAL
Refills: 0 | Status: DISCONTINUED | OUTPATIENT
Start: 2025-06-04 | End: 2025-06-05

## 2025-06-04 RX ORDER — PIPERACILLIN-TAZO-DEXTROSE,ISO 3.375G/5
3.38 IV SOLUTION, PIGGYBACK PREMIX FROZEN(ML) INTRAVENOUS EVERY 8 HOURS
Refills: 0 | Status: DISCONTINUED | OUTPATIENT
Start: 2025-06-05 | End: 2025-06-05

## 2025-06-04 RX ORDER — PIPERACILLIN-TAZO-DEXTROSE,ISO 3.375G/5
3.38 IV SOLUTION, PIGGYBACK PREMIX FROZEN(ML) INTRAVENOUS ONCE
Refills: 0 | Status: COMPLETED | OUTPATIENT
Start: 2025-06-04 | End: 2025-06-04

## 2025-06-04 RX ORDER — ACETAMINOPHEN 500 MG/5ML
1000 LIQUID (ML) ORAL ONCE
Refills: 0 | Status: COMPLETED | OUTPATIENT
Start: 2025-06-04 | End: 2025-06-04

## 2025-06-04 RX ORDER — ENOXAPARIN SODIUM 100 MG/ML
40 INJECTION SUBCUTANEOUS EVERY 24 HOURS
Refills: 0 | Status: DISCONTINUED | OUTPATIENT
Start: 2025-06-04 | End: 2025-06-07

## 2025-06-04 RX ORDER — METOCLOPRAMIDE HCL 10 MG
10 TABLET ORAL ONCE
Refills: 0 | Status: COMPLETED | OUTPATIENT
Start: 2025-06-04 | End: 2025-06-04

## 2025-06-04 RX ADMIN — DIAZEPAM 10 MILLIGRAM(S): 5 TABLET ORAL at 07:05

## 2025-06-04 RX ADMIN — Medication 325 MILLIGRAM(S): at 11:53

## 2025-06-04 RX ADMIN — ENOXAPARIN SODIUM 40 MILLIGRAM(S): 100 INJECTION SUBCUTANEOUS at 20:28

## 2025-06-04 RX ADMIN — Medication 200 GRAM(S): at 22:52

## 2025-06-04 RX ADMIN — Medication 400 MILLIGRAM(S): at 20:28

## 2025-06-04 RX ADMIN — FOLIC ACID 1 MILLIGRAM(S): 1 TABLET ORAL at 11:53

## 2025-06-04 RX ADMIN — DIAZEPAM 10 MILLIGRAM(S): 5 TABLET ORAL at 01:51

## 2025-06-04 RX ADMIN — DIAZEPAM 10 MILLIGRAM(S): 5 TABLET ORAL at 14:09

## 2025-06-04 RX ADMIN — LACTULOSE 20 GRAM(S): 10 SOLUTION ORAL at 05:02

## 2025-06-04 RX ADMIN — DIAZEPAM 10 MILLIGRAM(S): 5 TABLET ORAL at 00:54

## 2025-06-04 RX ADMIN — DIAZEPAM 10 MILLIGRAM(S): 5 TABLET ORAL at 17:21

## 2025-06-04 RX ADMIN — Medication 2 PACKET(S): at 23:18

## 2025-06-04 RX ADMIN — Medication 2 PACKET(S): at 11:52

## 2025-06-04 RX ADMIN — PHENOBARBITAL 62.5 MILLIGRAM(S): 30 TABLET ORAL at 03:02

## 2025-06-04 RX ADMIN — Medication 4 MILLIGRAM(S): at 20:28

## 2025-06-04 RX ADMIN — PHENOBARBITAL 62.5 MILLIGRAM(S): 30 TABLET ORAL at 06:01

## 2025-06-04 RX ADMIN — Medication 100 MILLIGRAM(S): at 11:52

## 2025-06-04 RX ADMIN — DIAZEPAM 10 MILLIGRAM(S): 5 TABLET ORAL at 05:01

## 2025-06-04 RX ADMIN — Medication 10 MILLIGRAM(S): at 06:31

## 2025-06-04 RX ADMIN — Medication 1000 MILLIGRAM(S): at 20:38

## 2025-06-04 RX ADMIN — DIAZEPAM 10 MILLIGRAM(S): 5 TABLET ORAL at 20:28

## 2025-06-04 RX ADMIN — DIAZEPAM 10 MILLIGRAM(S): 5 TABLET ORAL at 10:16

## 2025-06-04 RX ADMIN — LACTULOSE 20 GRAM(S): 10 SOLUTION ORAL at 17:21

## 2025-06-04 RX ADMIN — Medication 2 PACKET(S): at 17:21

## 2025-06-04 RX ADMIN — DIAZEPAM 10 MILLIGRAM(S): 5 TABLET ORAL at 08:31

## 2025-06-04 RX ADMIN — Medication 80 MILLIGRAM(S): at 00:02

## 2025-06-04 RX ADMIN — Medication 1 TABLET(S): at 11:52

## 2025-06-04 RX ADMIN — DIAZEPAM 10 MILLIGRAM(S): 5 TABLET ORAL at 22:52

## 2025-06-04 RX ADMIN — PHENOBARBITAL 62.5 MILLIGRAM(S): 30 TABLET ORAL at 09:31

## 2025-06-04 RX ADMIN — DIAZEPAM 10 MILLIGRAM(S): 5 TABLET ORAL at 03:53

## 2025-06-04 RX ADMIN — Medication 40 MILLIGRAM(S): at 05:03

## 2025-06-05 LAB
ALBUMIN SERPL ELPH-MCNC: 2.9 G/DL — LOW (ref 3.3–5)
ALP SERPL-CCNC: 75 U/L — SIGNIFICANT CHANGE UP (ref 40–120)
ALT FLD-CCNC: 111 U/L — HIGH (ref 12–78)
ANION GAP SERPL CALC-SCNC: 5 MMOL/L — SIGNIFICANT CHANGE UP (ref 5–17)
APTT BLD: 29.2 SEC — SIGNIFICANT CHANGE UP (ref 26.1–36.8)
AST SERPL-CCNC: 163 U/L — HIGH (ref 15–37)
BILIRUB SERPL-MCNC: 1.9 MG/DL — HIGH (ref 0.2–1.2)
BUN SERPL-MCNC: 6 MG/DL — LOW (ref 7–23)
CALCIUM SERPL-MCNC: 8 MG/DL — LOW (ref 8.5–10.1)
CHLORIDE SERPL-SCNC: 111 MMOL/L — HIGH (ref 96–108)
CK SERPL-CCNC: 739 U/L — HIGH (ref 26–308)
CO2 SERPL-SCNC: 27 MMOL/L — SIGNIFICANT CHANGE UP (ref 22–31)
CREAT SERPL-MCNC: 1.1 MG/DL — SIGNIFICANT CHANGE UP (ref 0.5–1.3)
CRP SERPL-MCNC: 93 MG/L — HIGH
EGFR: 78 ML/MIN/1.73M2 — SIGNIFICANT CHANGE UP
EGFR: 78 ML/MIN/1.73M2 — SIGNIFICANT CHANGE UP
GLUCOSE SERPL-MCNC: 125 MG/DL — HIGH (ref 70–99)
HCT VFR BLD CALC: 27.8 % — LOW (ref 39–50)
HGB BLD-MCNC: 8.4 G/DL — LOW (ref 13–17)
INR BLD: 1.51 RATIO — HIGH (ref 0.85–1.16)
LACTATE SERPL-SCNC: 1.7 MMOL/L — SIGNIFICANT CHANGE UP (ref 0.7–2)
MAGNESIUM SERPL-MCNC: 1.7 MG/DL — SIGNIFICANT CHANGE UP (ref 1.6–2.6)
MCHC RBC-ENTMCNC: 20.7 PG — LOW (ref 27–34)
MCHC RBC-ENTMCNC: 30.2 G/DL — LOW (ref 32–36)
MCV RBC AUTO: 68.5 FL — LOW (ref 80–100)
NRBC BLD AUTO-RTO: 2 /100 WBCS — HIGH (ref 0–0)
PHOSPHATE SERPL-MCNC: 2.9 MG/DL — SIGNIFICANT CHANGE UP (ref 2.5–4.5)
PLATELET # BLD AUTO: 146 K/UL — LOW (ref 150–400)
POTASSIUM SERPL-MCNC: 3.4 MMOL/L — LOW (ref 3.5–5.3)
POTASSIUM SERPL-SCNC: 3.4 MMOL/L — LOW (ref 3.5–5.3)
PROT SERPL-MCNC: 6.5 GM/DL — SIGNIFICANT CHANGE UP (ref 6–8.3)
PROTHROM AB SERPL-ACNC: 17.5 SEC — HIGH (ref 9.9–13.4)
RBC # BLD: 4.06 M/UL — LOW (ref 4.2–5.8)
RBC # FLD: 22.5 % — HIGH (ref 10.3–14.5)
SODIUM SERPL-SCNC: 143 MMOL/L — SIGNIFICANT CHANGE UP (ref 135–145)
WBC # BLD: 2.74 K/UL — LOW (ref 3.8–10.5)
WBC # FLD AUTO: 2.74 K/UL — LOW (ref 3.8–10.5)

## 2025-06-05 PROCEDURE — 99232 SBSQ HOSP IP/OBS MODERATE 35: CPT

## 2025-06-05 PROCEDURE — 70450 CT HEAD/BRAIN W/O DYE: CPT | Mod: 26

## 2025-06-05 RX ORDER — SODIUM CHLORIDE 9 G/1000ML
1000 INJECTION, SOLUTION INTRAVENOUS
Refills: 0 | Status: DISCONTINUED | OUTPATIENT
Start: 2025-06-05 | End: 2025-06-05

## 2025-06-05 RX ORDER — TRAMADOL HYDROCHLORIDE 50 MG/1
25 TABLET, FILM COATED ORAL ONCE
Refills: 0 | Status: DISCONTINUED | OUTPATIENT
Start: 2025-06-05 | End: 2025-06-05

## 2025-06-05 RX ORDER — POTASSIUM CHLORIDE, DEXTROSE MONOHYDRATE AND SODIUM CHLORIDE 150; 5; 900 MG/100ML; G/100ML; MG/100ML
1000 INJECTION, SOLUTION INTRAVENOUS
Refills: 0 | Status: DISCONTINUED | OUTPATIENT
Start: 2025-06-05 | End: 2025-06-07

## 2025-06-05 RX ADMIN — SODIUM CHLORIDE 125 MILLILITER(S): 9 INJECTION, SOLUTION INTRAVENOUS at 09:25

## 2025-06-05 RX ADMIN — Medication 100 MILLIEQUIVALENT(S): at 18:59

## 2025-06-05 RX ADMIN — Medication 325 MILLIGRAM(S): at 12:42

## 2025-06-05 RX ADMIN — DIAZEPAM 10 MILLIGRAM(S): 5 TABLET ORAL at 10:38

## 2025-06-05 RX ADMIN — FOLIC ACID 1 MILLIGRAM(S): 1 TABLET ORAL at 12:42

## 2025-06-05 RX ADMIN — TRAMADOL HYDROCHLORIDE 25 MILLIGRAM(S): 50 TABLET, FILM COATED ORAL at 17:16

## 2025-06-05 RX ADMIN — DIAZEPAM 10 MILLIGRAM(S): 5 TABLET ORAL at 21:28

## 2025-06-05 RX ADMIN — LACTULOSE 20 GRAM(S): 10 SOLUTION ORAL at 06:45

## 2025-06-05 RX ADMIN — Medication 100 MILLIEQUIVALENT(S): at 16:54

## 2025-06-05 RX ADMIN — SODIUM CHLORIDE 125 MILLILITER(S): 9 INJECTION, SOLUTION INTRAVENOUS at 07:02

## 2025-06-05 RX ADMIN — Medication 2 PACKET(S): at 06:46

## 2025-06-05 RX ADMIN — Medication 2 PACKET(S): at 12:42

## 2025-06-05 RX ADMIN — Medication 1 TABLET(S): at 12:43

## 2025-06-05 RX ADMIN — DIAZEPAM 10 MILLIGRAM(S): 5 TABLET ORAL at 17:08

## 2025-06-05 RX ADMIN — Medication 100 MILLIGRAM(S): at 12:42

## 2025-06-05 RX ADMIN — DIAZEPAM 10 MILLIGRAM(S): 5 TABLET ORAL at 02:31

## 2025-06-05 RX ADMIN — PHENOBARBITAL 62.5 MILLIGRAM(S): 30 TABLET ORAL at 22:38

## 2025-06-05 RX ADMIN — Medication 100 MILLIEQUIVALENT(S): at 17:10

## 2025-06-05 RX ADMIN — ENOXAPARIN SODIUM 40 MILLIGRAM(S): 100 INJECTION SUBCUTANEOUS at 21:27

## 2025-06-05 RX ADMIN — Medication 40 MILLIGRAM(S): at 06:45

## 2025-06-05 RX ADMIN — DIAZEPAM 10 MILLIGRAM(S): 5 TABLET ORAL at 14:28

## 2025-06-05 RX ADMIN — POTASSIUM CHLORIDE, DEXTROSE MONOHYDRATE AND SODIUM CHLORIDE 60 MILLILITER(S): 150; 5; 900 INJECTION, SOLUTION INTRAVENOUS at 17:09

## 2025-06-05 RX ADMIN — DIAZEPAM 10 MILLIGRAM(S): 5 TABLET ORAL at 06:44

## 2025-06-05 RX ADMIN — POTASSIUM CHLORIDE, DEXTROSE MONOHYDRATE AND SODIUM CHLORIDE 60 MILLILITER(S): 150; 5; 900 INJECTION, SOLUTION INTRAVENOUS at 21:27

## 2025-06-05 RX ADMIN — TRAMADOL HYDROCHLORIDE 25 MILLIGRAM(S): 50 TABLET, FILM COATED ORAL at 16:54

## 2025-06-06 LAB
ALBUMIN SERPL ELPH-MCNC: 2.9 G/DL — LOW (ref 3.3–5)
ALP SERPL-CCNC: 84 U/L — SIGNIFICANT CHANGE UP (ref 40–120)
ALT FLD-CCNC: 105 U/L — HIGH (ref 12–78)
ANION GAP SERPL CALC-SCNC: 5 MMOL/L — SIGNIFICANT CHANGE UP (ref 5–17)
AST SERPL-CCNC: 131 U/L — HIGH (ref 15–37)
BILIRUB SERPL-MCNC: 1.3 MG/DL — HIGH (ref 0.2–1.2)
BUN SERPL-MCNC: 2 MG/DL — LOW (ref 7–23)
CALCIUM SERPL-MCNC: 8 MG/DL — LOW (ref 8.5–10.1)
CHLORIDE SERPL-SCNC: 109 MMOL/L — HIGH (ref 96–108)
CO2 SERPL-SCNC: 24 MMOL/L — SIGNIFICANT CHANGE UP (ref 22–31)
CREAT SERPL-MCNC: 0.85 MG/DL — SIGNIFICANT CHANGE UP (ref 0.5–1.3)
EGFR: 101 ML/MIN/1.73M2 — SIGNIFICANT CHANGE UP
EGFR: 101 ML/MIN/1.73M2 — SIGNIFICANT CHANGE UP
GLUCOSE SERPL-MCNC: 91 MG/DL — SIGNIFICANT CHANGE UP (ref 70–99)
HCT VFR BLD CALC: 29.8 % — LOW (ref 39–50)
HGB BLD-MCNC: 8.9 G/DL — LOW (ref 13–17)
MAGNESIUM SERPL-MCNC: 1.5 MG/DL — LOW (ref 1.6–2.6)
MCHC RBC-ENTMCNC: 20.5 PG — LOW (ref 27–34)
MCHC RBC-ENTMCNC: 29.9 G/DL — LOW (ref 32–36)
MCV RBC AUTO: 68.7 FL — LOW (ref 80–100)
NRBC BLD AUTO-RTO: 1 /100 WBCS — HIGH (ref 0–0)
PHOSPHATE SERPL-MCNC: 2.8 MG/DL — SIGNIFICANT CHANGE UP (ref 2.5–4.5)
PLATELET # BLD AUTO: 142 K/UL — LOW (ref 150–400)
POTASSIUM SERPL-MCNC: 3.3 MMOL/L — LOW (ref 3.5–5.3)
POTASSIUM SERPL-SCNC: 3.3 MMOL/L — LOW (ref 3.5–5.3)
PROT SERPL-MCNC: 6.6 GM/DL — SIGNIFICANT CHANGE UP (ref 6–8.3)
RBC # BLD: 4.34 M/UL — SIGNIFICANT CHANGE UP (ref 4.2–5.8)
RBC # FLD: 22.9 % — HIGH (ref 10.3–14.5)
SODIUM SERPL-SCNC: 138 MMOL/L — SIGNIFICANT CHANGE UP (ref 135–145)
WBC # BLD: 3.06 K/UL — LOW (ref 3.8–10.5)
WBC # FLD AUTO: 3.06 K/UL — LOW (ref 3.8–10.5)

## 2025-06-06 PROCEDURE — 99232 SBSQ HOSP IP/OBS MODERATE 35: CPT

## 2025-06-06 RX ORDER — MAGNESIUM SULFATE 500 MG/ML
2 SYRINGE (ML) INJECTION ONCE
Refills: 0 | Status: COMPLETED | OUTPATIENT
Start: 2025-06-06 | End: 2025-06-06

## 2025-06-06 RX ORDER — MAGNESIUM SULFATE 500 MG/ML
1 SYRINGE (ML) INJECTION ONCE
Refills: 0 | Status: DISCONTINUED | OUTPATIENT
Start: 2025-06-06 | End: 2025-06-06

## 2025-06-06 RX ORDER — TRAMADOL HYDROCHLORIDE 50 MG/1
25 TABLET, FILM COATED ORAL ONCE
Refills: 0 | Status: DISCONTINUED | OUTPATIENT
Start: 2025-06-06 | End: 2025-06-06

## 2025-06-06 RX ORDER — LOPERAMIDE HCL 1 MG/7.5ML
2 SOLUTION ORAL EVERY 4 HOURS
Refills: 0 | Status: DISCONTINUED | OUTPATIENT
Start: 2025-06-06 | End: 2025-06-07

## 2025-06-06 RX ADMIN — TRAMADOL HYDROCHLORIDE 25 MILLIGRAM(S): 50 TABLET, FILM COATED ORAL at 16:51

## 2025-06-06 RX ADMIN — POTASSIUM CHLORIDE, DEXTROSE MONOHYDRATE AND SODIUM CHLORIDE 60 MILLILITER(S): 150; 5; 900 INJECTION, SOLUTION INTRAVENOUS at 05:57

## 2025-06-06 RX ADMIN — Medication 100 MILLIEQUIVALENT(S): at 11:44

## 2025-06-06 RX ADMIN — POTASSIUM CHLORIDE, DEXTROSE MONOHYDRATE AND SODIUM CHLORIDE 60 MILLILITER(S): 150; 5; 900 INJECTION, SOLUTION INTRAVENOUS at 21:23

## 2025-06-06 RX ADMIN — Medication 1 TABLET(S): at 11:43

## 2025-06-06 RX ADMIN — PHENOBARBITAL 62.5 MILLIGRAM(S): 30 TABLET ORAL at 02:39

## 2025-06-06 RX ADMIN — Medication 100 MILLIEQUIVALENT(S): at 13:19

## 2025-06-06 RX ADMIN — TRAMADOL HYDROCHLORIDE 25 MILLIGRAM(S): 50 TABLET, FILM COATED ORAL at 17:30

## 2025-06-06 RX ADMIN — FOLIC ACID 1 MILLIGRAM(S): 1 TABLET ORAL at 11:43

## 2025-06-06 RX ADMIN — LOPERAMIDE HCL 2 MILLIGRAM(S): 1 SOLUTION ORAL at 13:18

## 2025-06-06 RX ADMIN — LACTULOSE 20 GRAM(S): 10 SOLUTION ORAL at 16:52

## 2025-06-06 RX ADMIN — POTASSIUM CHLORIDE, DEXTROSE MONOHYDRATE AND SODIUM CHLORIDE 60 MILLILITER(S): 150; 5; 900 INJECTION, SOLUTION INTRAVENOUS at 16:54

## 2025-06-06 RX ADMIN — Medication 25 GRAM(S): at 10:09

## 2025-06-06 RX ADMIN — Medication 325 MILLIGRAM(S): at 11:43

## 2025-06-06 RX ADMIN — LACTULOSE 20 GRAM(S): 10 SOLUTION ORAL at 05:57

## 2025-06-06 RX ADMIN — Medication 100 MILLIEQUIVALENT(S): at 10:29

## 2025-06-06 RX ADMIN — Medication 100 MILLIGRAM(S): at 11:43

## 2025-06-06 RX ADMIN — DIAZEPAM 10 MILLIGRAM(S): 5 TABLET ORAL at 11:44

## 2025-06-06 RX ADMIN — Medication 40 MILLIEQUIVALENT(S): at 10:09

## 2025-06-06 RX ADMIN — Medication 40 MILLIGRAM(S): at 05:57

## 2025-06-06 RX ADMIN — ENOXAPARIN SODIUM 40 MILLIGRAM(S): 100 INJECTION SUBCUTANEOUS at 21:21

## 2025-06-06 RX ADMIN — DIAZEPAM 10 MILLIGRAM(S): 5 TABLET ORAL at 05:58

## 2025-06-06 RX ADMIN — POTASSIUM CHLORIDE, DEXTROSE MONOHYDRATE AND SODIUM CHLORIDE 60 MILLILITER(S): 150; 5; 900 INJECTION, SOLUTION INTRAVENOUS at 07:45

## 2025-06-07 ENCOUNTER — TRANSCRIPTION ENCOUNTER (OUTPATIENT)
Age: 60
End: 2025-06-07

## 2025-06-07 VITALS
RESPIRATION RATE: 18 BRPM | TEMPERATURE: 98 F | OXYGEN SATURATION: 99 % | DIASTOLIC BLOOD PRESSURE: 87 MMHG | SYSTOLIC BLOOD PRESSURE: 133 MMHG | HEART RATE: 88 BPM

## 2025-06-07 LAB
ALBUMIN SERPL ELPH-MCNC: 2.9 G/DL — LOW (ref 3.3–5)
ALP SERPL-CCNC: 98 U/L — SIGNIFICANT CHANGE UP (ref 40–120)
ALT FLD-CCNC: 108 U/L — HIGH (ref 12–78)
ANION GAP SERPL CALC-SCNC: 3 MMOL/L — LOW (ref 5–17)
AST SERPL-CCNC: 121 U/L — HIGH (ref 15–37)
BILIRUB SERPL-MCNC: 1 MG/DL — SIGNIFICANT CHANGE UP (ref 0.2–1.2)
BUN SERPL-MCNC: 5 MG/DL — LOW (ref 7–23)
CALCIUM SERPL-MCNC: 8.3 MG/DL — LOW (ref 8.5–10.1)
CHLORIDE SERPL-SCNC: 111 MMOL/L — HIGH (ref 96–108)
CO2 SERPL-SCNC: 21 MMOL/L — LOW (ref 22–31)
CREAT SERPL-MCNC: 1.13 MG/DL — SIGNIFICANT CHANGE UP (ref 0.5–1.3)
EGFR: 75 ML/MIN/1.73M2 — SIGNIFICANT CHANGE UP
EGFR: 75 ML/MIN/1.73M2 — SIGNIFICANT CHANGE UP
GLUCOSE SERPL-MCNC: 157 MG/DL — HIGH (ref 70–99)
HCT VFR BLD CALC: 33.4 % — LOW (ref 39–50)
HGB BLD-MCNC: 10.1 G/DL — LOW (ref 13–17)
MAGNESIUM SERPL-MCNC: 1.9 MG/DL — SIGNIFICANT CHANGE UP (ref 1.6–2.6)
MCHC RBC-ENTMCNC: 21 PG — LOW (ref 27–34)
MCHC RBC-ENTMCNC: 30.2 G/DL — LOW (ref 32–36)
MCV RBC AUTO: 69.6 FL — LOW (ref 80–100)
NRBC BLD AUTO-RTO: 1 /100 WBCS — HIGH (ref 0–0)
PHOSPHATE SERPL-MCNC: 2.5 MG/DL — SIGNIFICANT CHANGE UP (ref 2.5–4.5)
PLATELET # BLD AUTO: 158 K/UL — SIGNIFICANT CHANGE UP (ref 150–400)
POTASSIUM SERPL-MCNC: 4.7 MMOL/L — SIGNIFICANT CHANGE UP (ref 3.5–5.3)
POTASSIUM SERPL-SCNC: 4.7 MMOL/L — SIGNIFICANT CHANGE UP (ref 3.5–5.3)
PROT SERPL-MCNC: 7 GM/DL — SIGNIFICANT CHANGE UP (ref 6–8.3)
RBC # BLD: 4.8 M/UL — SIGNIFICANT CHANGE UP (ref 4.2–5.8)
RBC # FLD: 23.8 % — HIGH (ref 10.3–14.5)
SODIUM SERPL-SCNC: 135 MMOL/L — SIGNIFICANT CHANGE UP (ref 135–145)
WBC # BLD: 3.06 K/UL — LOW (ref 3.8–10.5)
WBC # FLD AUTO: 3.06 K/UL — LOW (ref 3.8–10.5)

## 2025-06-07 PROCEDURE — 99239 HOSP IP/OBS DSCHRG MGMT >30: CPT

## 2025-06-07 RX ADMIN — Medication 1 TABLET(S): at 11:26

## 2025-06-07 RX ADMIN — Medication 100 MILLIGRAM(S): at 11:44

## 2025-06-07 RX ADMIN — Medication 40 MILLIGRAM(S): at 05:23

## 2025-06-07 RX ADMIN — Medication 325 MILLIGRAM(S): at 11:26

## 2025-06-07 RX ADMIN — FOLIC ACID 1 MILLIGRAM(S): 1 TABLET ORAL at 11:26

## 2025-06-07 RX ADMIN — LACTULOSE 20 GRAM(S): 10 SOLUTION ORAL at 17:34

## 2025-06-07 RX ADMIN — LACTULOSE 20 GRAM(S): 10 SOLUTION ORAL at 05:23

## 2025-06-12 ENCOUNTER — EMERGENCY (EMERGENCY)
Facility: HOSPITAL | Age: 60
LOS: 0 days | Discharge: HOME | End: 2025-06-13
Attending: STUDENT IN AN ORGANIZED HEALTH CARE EDUCATION/TRAINING PROGRAM
Payer: MEDICAID

## 2025-06-12 VITALS
TEMPERATURE: 100 F | HEIGHT: 67 IN | SYSTOLIC BLOOD PRESSURE: 136 MMHG | OXYGEN SATURATION: 94 % | DIASTOLIC BLOOD PRESSURE: 83 MMHG | RESPIRATION RATE: 18 BRPM | WEIGHT: 179.9 LBS | HEART RATE: 119 BPM

## 2025-06-12 DIAGNOSIS — F10.220 ALCOHOL DEPENDENCE WITH INTOXICATION, UNCOMPLICATED: ICD-10-CM

## 2025-06-12 DIAGNOSIS — F10.129 ALCOHOL ABUSE WITH INTOXICATION, UNSPECIFIED: ICD-10-CM

## 2025-06-12 DIAGNOSIS — D50.9 IRON DEFICIENCY ANEMIA, UNSPECIFIED: ICD-10-CM

## 2025-06-12 DIAGNOSIS — K25.9 GASTRIC ULCER, UNSPECIFIED AS ACUTE OR CHRONIC, WITHOUT HEMORRHAGE OR PERFORATION: ICD-10-CM
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
## 2025-06-12 DIAGNOSIS — R19.7 DIARRHEA, UNSPECIFIED: ICD-10-CM

## 2025-06-12 DIAGNOSIS — E86.0 DEHYDRATION: ICD-10-CM

## 2025-06-12 DIAGNOSIS — M79.10 MYALGIA, UNSPECIFIED SITE: ICD-10-CM

## 2025-06-12 DIAGNOSIS — K70.30 ALCOHOLIC CIRRHOSIS OF LIVER WITHOUT ASCITES: ICD-10-CM

## 2025-06-12 DIAGNOSIS — Z71.3 DIETARY COUNSELING AND SURVEILLANCE: ICD-10-CM

## 2025-06-12 DIAGNOSIS — E87.6 HYPOKALEMIA: ICD-10-CM

## 2025-06-12 DIAGNOSIS — K29.20 ALCOHOLIC GASTRITIS WITHOUT BLEEDING: ICD-10-CM

## 2025-06-12 DIAGNOSIS — F10.230 ALCOHOL DEPENDENCE WITH WITHDRAWAL, UNCOMPLICATED: ICD-10-CM

## 2025-06-12 DIAGNOSIS — R79.89 OTHER SPECIFIED ABNORMAL FINDINGS OF BLOOD CHEMISTRY: ICD-10-CM

## 2025-06-12 DIAGNOSIS — K76.6 PORTAL HYPERTENSION: ICD-10-CM

## 2025-06-12 DIAGNOSIS — E83.42 HYPOMAGNESEMIA: ICD-10-CM
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
## 2025-06-12 DIAGNOSIS — Z91.199 PATIENT'S NONCOMPLIANCE WITH OTHER MEDICAL TREATMENT AND REGIMEN DUE TO UNSPECIFIED REASON: ICD-10-CM

## 2025-06-12 DIAGNOSIS — M62.82 RHABDOMYOLYSIS: ICD-10-CM

## 2025-06-12 DIAGNOSIS — R11.10 VOMITING, UNSPECIFIED: ICD-10-CM

## 2025-06-12 DIAGNOSIS — E83.39 OTHER DISORDERS OF PHOSPHORUS METABOLISM: ICD-10-CM

## 2025-06-12 DIAGNOSIS — R50.9 FEVER, UNSPECIFIED: ICD-10-CM

## 2025-06-12 DIAGNOSIS — R74.01 ELEVATION OF LEVELS OF LIVER TRANSAMINASE LEVELS: ICD-10-CM

## 2025-06-12 DIAGNOSIS — R00.0 TACHYCARDIA, UNSPECIFIED: ICD-10-CM

## 2025-06-12 DIAGNOSIS — E87.20 ACIDOSIS, UNSPECIFIED: ICD-10-CM

## 2025-06-12 PROCEDURE — 99284 EMERGENCY DEPT VISIT MOD MDM: CPT

## 2025-06-12 RX ORDER — LORAZEPAM 4 MG/ML
1 VIAL (ML) INJECTION ONCE
Refills: 0 | Status: DISCONTINUED | OUTPATIENT
Start: 2025-06-12 | End: 2025-06-13

## 2025-06-12 NOTE — ED ADULT NURSE NOTE - CHIEF COMPLAINT QUOTE
BIBA,  pt c/o abd pain since yesterday.  pt denies drinking, per EMS wife advised that he was drinking.  +vomiting today.

## 2025-06-12 NOTE — ED ADULT TRIAGE NOTE - CHIEF COMPLAINT QUOTE
BIBA,  pt c/o abd pain since yesterday.  pt denies drinking, per EMS wife advised that he was drinking.  +vomiting today BIBA,  pt c/o abd pain since yesterday.  pt denies drinking, per EMS wife advised that he was drinking.  +vomiting today.

## 2025-06-12 NOTE — ED ADULT NURSE NOTE - NSFALLHARMRISKINTERV_ED_ALL_ED
Communicate risk of Fall with Harm to all staff, patient, and family/Provide visual cue: red socks, yellow wristband, yellow gown, etc/Reinforce activity limits and safety measures with patient and family/Use of alarms - bed, stretcher, chair and/or video monitoring/Bed in lowest position, wheels locked, appropriate side rails in place/Call bell, personal items and telephone in reach/Instruct patient to call for assistance before getting out of bed/chair/stretcher/Non-slip footwear applied when patient is off stretcher/Mobile to call system/Physically safe environment - no spills, clutter or unnecessary equipment/Purposeful Proactive Rounding/Room/bathroom lighting operational, light cord in reach

## 2025-06-12 NOTE — ED ADULT NURSE NOTE - ED STAT RN HANDOFF DETAILS
report given to lili laguna. pt awake/alert/ fall risk and redirected multiple times for safety. lab collection and meds endorsed to RN.

## 2025-06-12 NOTE — ED ADULT NURSE NOTE - OBJECTIVE STATEMENT
Pt BIBA from home, per EMS pt's wife confirmed that pt had been drinking today. Pt AOx2, responsive, ambulatory, denies he drank alcohol today; states last drink was 3-4 days ago. Pt c/o headache, generalized abdominal pain, and generalized body pain, vomiting x multiple days. Hx etoh abuse.

## 2025-06-13 ENCOUNTER — INPATIENT (INPATIENT)
Facility: HOSPITAL | Age: 60
LOS: 1 days | Discharge: HOME | End: 2025-06-15
Attending: INTERNAL MEDICINE | Admitting: INTERNAL MEDICINE
Payer: MEDICAID

## 2025-06-13 VITALS
SYSTOLIC BLOOD PRESSURE: 151 MMHG | RESPIRATION RATE: 18 BRPM | OXYGEN SATURATION: 99 % | HEIGHT: 67 IN | TEMPERATURE: 98 F | DIASTOLIC BLOOD PRESSURE: 94 MMHG | HEART RATE: 109 BPM

## 2025-06-13 VITALS
HEART RATE: 96 BPM | TEMPERATURE: 99 F | DIASTOLIC BLOOD PRESSURE: 89 MMHG | OXYGEN SATURATION: 95 % | SYSTOLIC BLOOD PRESSURE: 146 MMHG | RESPIRATION RATE: 18 BRPM

## 2025-06-13 DIAGNOSIS — K70.30 ALCOHOLIC CIRRHOSIS OF LIVER WITHOUT ASCITES: ICD-10-CM

## 2025-06-13 DIAGNOSIS — F10.939 ALCOHOL USE, UNSPECIFIED WITH WITHDRAWAL, UNSPECIFIED: ICD-10-CM

## 2025-06-13 LAB
ALBUMIN SERPL ELPH-MCNC: 3.2 G/DL — LOW (ref 3.3–5)
ALBUMIN SERPL ELPH-MCNC: 3.3 G/DL — SIGNIFICANT CHANGE UP (ref 3.3–5)
ALP SERPL-CCNC: 77 U/L — SIGNIFICANT CHANGE UP (ref 40–120)
ALP SERPL-CCNC: 84 U/L — SIGNIFICANT CHANGE UP (ref 40–120)
ALT FLD-CCNC: 81 U/L — HIGH (ref 12–78)
ALT FLD-CCNC: 84 U/L — HIGH (ref 12–78)
ANION GAP SERPL CALC-SCNC: 11 MMOL/L — SIGNIFICANT CHANGE UP (ref 5–17)
ANION GAP SERPL CALC-SCNC: 12 MMOL/L — SIGNIFICANT CHANGE UP (ref 5–17)
ANISOCYTOSIS BLD QL: SLIGHT — SIGNIFICANT CHANGE UP
AST SERPL-CCNC: 100 U/L — HIGH (ref 15–37)
AST SERPL-CCNC: 90 U/L — HIGH (ref 15–37)
BASOPHILS # BLD AUTO: 0.03 K/UL — SIGNIFICANT CHANGE UP (ref 0–0.2)
BASOPHILS # BLD AUTO: 0.04 K/UL — SIGNIFICANT CHANGE UP (ref 0–0.2)
BASOPHILS NFR BLD AUTO: 0.5 % — SIGNIFICANT CHANGE UP (ref 0–2)
BASOPHILS NFR BLD AUTO: 0.6 % — SIGNIFICANT CHANGE UP (ref 0–2)
BILIRUB SERPL-MCNC: 1 MG/DL — SIGNIFICANT CHANGE UP (ref 0.2–1.2)
BILIRUB SERPL-MCNC: 1.6 MG/DL — HIGH (ref 0.2–1.2)
BUN SERPL-MCNC: 12 MG/DL — SIGNIFICANT CHANGE UP (ref 7–23)
BUN SERPL-MCNC: 15 MG/DL — SIGNIFICANT CHANGE UP (ref 7–23)
CALCIUM SERPL-MCNC: 8.2 MG/DL — LOW (ref 8.5–10.1)
CALCIUM SERPL-MCNC: 8.7 MG/DL — SIGNIFICANT CHANGE UP (ref 8.5–10.1)
CHLORIDE SERPL-SCNC: 111 MMOL/L — HIGH (ref 96–108)
CHLORIDE SERPL-SCNC: 112 MMOL/L — HIGH (ref 96–108)
CO2 SERPL-SCNC: 20 MMOL/L — LOW (ref 22–31)
CO2 SERPL-SCNC: 20 MMOL/L — LOW (ref 22–31)
CREAT SERPL-MCNC: 0.96 MG/DL — SIGNIFICANT CHANGE UP (ref 0.5–1.3)
CREAT SERPL-MCNC: 0.97 MG/DL — SIGNIFICANT CHANGE UP (ref 0.5–1.3)
EGFR: 90 ML/MIN/1.73M2 — SIGNIFICANT CHANGE UP
EGFR: 90 ML/MIN/1.73M2 — SIGNIFICANT CHANGE UP
EGFR: 92 ML/MIN/1.73M2 — SIGNIFICANT CHANGE UP
EGFR: 92 ML/MIN/1.73M2 — SIGNIFICANT CHANGE UP
ELLIPTOCYTES BLD QL SMEAR: SLIGHT — SIGNIFICANT CHANGE UP
EOSINOPHIL # BLD AUTO: 0.02 K/UL — SIGNIFICANT CHANGE UP (ref 0–0.5)
EOSINOPHIL # BLD AUTO: 0.06 K/UL — SIGNIFICANT CHANGE UP (ref 0–0.5)
EOSINOPHIL NFR BLD AUTO: 0.3 % — SIGNIFICANT CHANGE UP (ref 0–6)
EOSINOPHIL NFR BLD AUTO: 0.9 % — SIGNIFICANT CHANGE UP (ref 0–6)
ETHANOL SERPL-MCNC: 237 MG/DL — HIGH (ref 0–10)
ETHANOL SERPL-MCNC: 77 MG/DL — HIGH (ref 0–10)
GLUCOSE SERPL-MCNC: 87 MG/DL — SIGNIFICANT CHANGE UP (ref 70–99)
GLUCOSE SERPL-MCNC: 95 MG/DL — SIGNIFICANT CHANGE UP (ref 70–99)
HCT VFR BLD CALC: 31.3 % — LOW (ref 39–50)
HCT VFR BLD CALC: 32.4 % — LOW (ref 39–50)
HGB BLD-MCNC: 10 G/DL — LOW (ref 13–17)
HGB BLD-MCNC: 9.7 G/DL — LOW (ref 13–17)
HYPOCHROMIA BLD QL: SLIGHT — SIGNIFICANT CHANGE UP
IMM GRANULOCYTES NFR BLD AUTO: 0.6 % — SIGNIFICANT CHANGE UP (ref 0–0.9)
IMM GRANULOCYTES NFR BLD AUTO: 0.6 % — SIGNIFICANT CHANGE UP (ref 0–0.9)
LIDOCAIN IGE QN: 29 U/L — SIGNIFICANT CHANGE UP (ref 13–75)
LYMPHOCYTES # BLD AUTO: 1.22 K/UL — SIGNIFICANT CHANGE UP (ref 1–3.3)
LYMPHOCYTES # BLD AUTO: 1.47 K/UL — SIGNIFICANT CHANGE UP (ref 1–3.3)
LYMPHOCYTES # BLD AUTO: 18.7 % — SIGNIFICANT CHANGE UP (ref 13–44)
LYMPHOCYTES # BLD AUTO: 23.1 % — SIGNIFICANT CHANGE UP (ref 13–44)
MACROCYTES BLD QL: SLIGHT — SIGNIFICANT CHANGE UP
MAGNESIUM SERPL-MCNC: 1.8 MG/DL — SIGNIFICANT CHANGE UP (ref 1.6–2.6)
MANUAL SMEAR VERIFICATION: SIGNIFICANT CHANGE UP
MCHC RBC-ENTMCNC: 21.5 PG — LOW (ref 27–34)
MCHC RBC-ENTMCNC: 21.9 PG — LOW (ref 27–34)
MCHC RBC-ENTMCNC: 30.9 G/DL — LOW (ref 32–36)
MCHC RBC-ENTMCNC: 31 G/DL — LOW (ref 32–36)
MCV RBC AUTO: 69.7 FL — LOW (ref 80–100)
MCV RBC AUTO: 70.7 FL — LOW (ref 80–100)
MONOCYTES # BLD AUTO: 0.79 K/UL — SIGNIFICANT CHANGE UP (ref 0–0.9)
MONOCYTES # BLD AUTO: 0.8 K/UL — SIGNIFICANT CHANGE UP (ref 0–0.9)
MONOCYTES NFR BLD AUTO: 12.1 % — SIGNIFICANT CHANGE UP (ref 2–14)
MONOCYTES NFR BLD AUTO: 12.6 % — SIGNIFICANT CHANGE UP (ref 2–14)
NEUTROPHILS # BLD AUTO: 3.94 K/UL — SIGNIFICANT CHANGE UP (ref 1.8–7.4)
NEUTROPHILS # BLD AUTO: 4.44 K/UL — SIGNIFICANT CHANGE UP (ref 1.8–7.4)
NEUTROPHILS NFR BLD AUTO: 62.2 % — SIGNIFICANT CHANGE UP (ref 43–77)
NEUTROPHILS NFR BLD AUTO: 67.8 % — SIGNIFICANT CHANGE UP (ref 43–77)
NRBC BLD AUTO-RTO: 0 /100 WBCS — SIGNIFICANT CHANGE UP (ref 0–0)
NRBC BLD AUTO-RTO: 0 /100 WBCS — SIGNIFICANT CHANGE UP (ref 0–0)
OVALOCYTES BLD QL SMEAR: SLIGHT — SIGNIFICANT CHANGE UP
PLAT MORPH BLD: NORMAL — SIGNIFICANT CHANGE UP
PLATELET # BLD AUTO: 158 K/UL — SIGNIFICANT CHANGE UP (ref 150–400)
PLATELET # BLD AUTO: 178 K/UL — SIGNIFICANT CHANGE UP (ref 150–400)
POIKILOCYTOSIS BLD QL AUTO: SLIGHT — SIGNIFICANT CHANGE UP
POTASSIUM SERPL-MCNC: 3.6 MMOL/L — SIGNIFICANT CHANGE UP (ref 3.5–5.3)
POTASSIUM SERPL-MCNC: 3.9 MMOL/L — SIGNIFICANT CHANGE UP (ref 3.5–5.3)
POTASSIUM SERPL-SCNC: 3.6 MMOL/L — SIGNIFICANT CHANGE UP (ref 3.5–5.3)
POTASSIUM SERPL-SCNC: 3.9 MMOL/L — SIGNIFICANT CHANGE UP (ref 3.5–5.3)
PROT SERPL-MCNC: 7.7 GM/DL — SIGNIFICANT CHANGE UP (ref 6–8.3)
PROT SERPL-MCNC: 7.8 GM/DL — SIGNIFICANT CHANGE UP (ref 6–8.3)
RBC # BLD: 4.43 M/UL — SIGNIFICANT CHANGE UP (ref 4.2–5.8)
RBC # BLD: 4.65 M/UL — SIGNIFICANT CHANGE UP (ref 4.2–5.8)
RBC # FLD: 27.8 % — HIGH (ref 10.3–14.5)
RBC # FLD: 27.9 % — HIGH (ref 10.3–14.5)
RBC BLD AUTO: ABNORMAL
SODIUM SERPL-SCNC: 143 MMOL/L — SIGNIFICANT CHANGE UP (ref 135–145)
SODIUM SERPL-SCNC: 143 MMOL/L — SIGNIFICANT CHANGE UP (ref 135–145)
WBC # BLD: 6.35 K/UL — SIGNIFICANT CHANGE UP (ref 3.8–10.5)
WBC # BLD: 6.54 K/UL — SIGNIFICANT CHANGE UP (ref 3.8–10.5)
WBC # FLD AUTO: 6.35 K/UL — SIGNIFICANT CHANGE UP (ref 3.8–10.5)
WBC # FLD AUTO: 6.54 K/UL — SIGNIFICANT CHANGE UP (ref 3.8–10.5)

## 2025-06-13 PROCEDURE — 99222 1ST HOSP IP/OBS MODERATE 55: CPT

## 2025-06-13 PROCEDURE — 93010 ELECTROCARDIOGRAM REPORT: CPT

## 2025-06-13 PROCEDURE — 99285 EMERGENCY DEPT VISIT HI MDM: CPT | Mod: 25

## 2025-06-13 RX ORDER — LACTULOSE 10 G/15ML
20 SOLUTION ORAL
Refills: 0 | Status: DISCONTINUED | OUTPATIENT
Start: 2025-06-13 | End: 2025-06-15

## 2025-06-13 RX ORDER — CHLORDIAZEPOXIDE HCL 10 MG
25 CAPSULE ORAL ONCE
Refills: 0 | Status: DISCONTINUED | OUTPATIENT
Start: 2025-06-13 | End: 2025-06-13

## 2025-06-13 RX ORDER — LORAZEPAM 4 MG/ML
2 VIAL (ML) INJECTION
Refills: 0 | Status: DISCONTINUED | OUTPATIENT
Start: 2025-06-13 | End: 2025-06-13

## 2025-06-13 RX ORDER — SODIUM CHLORIDE 9 G/1000ML
1000 INJECTION, SOLUTION INTRAVENOUS
Refills: 0 | Status: DISCONTINUED | OUTPATIENT
Start: 2025-06-13 | End: 2025-06-15

## 2025-06-13 RX ORDER — LORAZEPAM 4 MG/ML
1 VIAL (ML) INJECTION ONCE
Refills: 0 | Status: DISCONTINUED | OUTPATIENT
Start: 2025-06-13 | End: 2025-06-13

## 2025-06-13 RX ORDER — LORAZEPAM 4 MG/ML
2 VIAL (ML) INJECTION EVERY 6 HOURS
Refills: 0 | Status: DISCONTINUED | OUTPATIENT
Start: 2025-06-13 | End: 2025-06-14

## 2025-06-13 RX ORDER — B1/B2/B3/B5/B6/B12/VIT C/FOLIC 500-0.5 MG
1 TABLET ORAL DAILY
Refills: 0 | Status: DISCONTINUED | OUTPATIENT
Start: 2025-06-13 | End: 2025-06-15

## 2025-06-13 RX ORDER — LORAZEPAM 4 MG/ML
6 VIAL (ML) INJECTION
Refills: 0 | Status: DISCONTINUED | OUTPATIENT
Start: 2025-06-13 | End: 2025-06-13

## 2025-06-13 RX ORDER — LORAZEPAM 4 MG/ML
2 VIAL (ML) INJECTION
Refills: 0 | Status: DISCONTINUED | OUTPATIENT
Start: 2025-06-13 | End: 2025-06-15

## 2025-06-13 RX ORDER — MELATONIN 5 MG
3 TABLET ORAL AT BEDTIME
Refills: 0 | Status: DISCONTINUED | OUTPATIENT
Start: 2025-06-13 | End: 2025-06-15

## 2025-06-13 RX ORDER — KETOROLAC TROMETHAMINE 30 MG/ML
15 INJECTION, SOLUTION INTRAMUSCULAR; INTRAVENOUS ONCE
Refills: 0 | Status: DISCONTINUED | OUTPATIENT
Start: 2025-06-13 | End: 2025-06-13

## 2025-06-13 RX ORDER — ACETAMINOPHEN 500 MG/5ML
650 LIQUID (ML) ORAL ONCE
Refills: 0 | Status: COMPLETED | OUTPATIENT
Start: 2025-06-13 | End: 2025-06-13

## 2025-06-13 RX ORDER — LORAZEPAM 4 MG/ML
1.5 VIAL (ML) INJECTION EVERY 6 HOURS
Refills: 0 | Status: DISCONTINUED | OUTPATIENT
Start: 2025-06-14 | End: 2025-06-15

## 2025-06-13 RX ORDER — FERROUS SULFATE 137(45) MG
325 TABLET, EXTENDED RELEASE ORAL DAILY
Refills: 0 | Status: DISCONTINUED | OUTPATIENT
Start: 2025-06-13 | End: 2025-06-15

## 2025-06-13 RX ORDER — FOLIC ACID 1 MG/1
1 TABLET ORAL DAILY
Refills: 0 | Status: DISCONTINUED | OUTPATIENT
Start: 2025-06-14 | End: 2025-06-15

## 2025-06-13 RX ORDER — LORAZEPAM 4 MG/ML
VIAL (ML) INJECTION
Refills: 0 | Status: DISCONTINUED | OUTPATIENT
Start: 2025-06-13 | End: 2025-06-15

## 2025-06-13 RX ORDER — IBUPROFEN 200 MG
600 TABLET ORAL ONCE
Refills: 0 | Status: COMPLETED | OUTPATIENT
Start: 2025-06-13 | End: 2025-06-13

## 2025-06-13 RX ORDER — CHLORDIAZEPOXIDE HCL 10 MG
50 CAPSULE ORAL ONCE
Refills: 0 | Status: DISCONTINUED | OUTPATIENT
Start: 2025-06-13 | End: 2025-06-13

## 2025-06-13 RX ORDER — LORAZEPAM 4 MG/ML
1 VIAL (ML) INJECTION EVERY 6 HOURS
Refills: 0 | Status: DISCONTINUED | OUTPATIENT
Start: 2025-06-15 | End: 2025-06-15

## 2025-06-13 RX ORDER — MIDAZOLAM IN 0.9 % SOD.CHLORID 1 MG/ML
2 PLASTIC BAG, INJECTION (ML) INTRAVENOUS ONCE
Refills: 0 | Status: DISCONTINUED | OUTPATIENT
Start: 2025-06-13 | End: 2025-06-13

## 2025-06-13 RX ORDER — ONDANSETRON HCL/PF 4 MG/2 ML
4 VIAL (ML) INJECTION EVERY 6 HOURS
Refills: 0 | Status: DISCONTINUED | OUTPATIENT
Start: 2025-06-13 | End: 2025-06-15

## 2025-06-13 RX ORDER — LORAZEPAM 4 MG/ML
0.5 VIAL (ML) INJECTION EVERY 6 HOURS
Refills: 0 | Status: DISCONTINUED | OUTPATIENT
Start: 2025-06-16 | End: 2025-06-15

## 2025-06-13 RX ORDER — FOLIC ACID 1 MG/1
1 TABLET ORAL ONCE
Refills: 0 | Status: COMPLETED | OUTPATIENT
Start: 2025-06-13 | End: 2025-06-13

## 2025-06-13 RX ADMIN — Medication 600 MILLIGRAM(S): at 09:49

## 2025-06-13 RX ADMIN — Medication 650 MILLIGRAM(S): at 17:19

## 2025-06-13 RX ADMIN — LACTULOSE 20 GRAM(S): 10 SOLUTION ORAL at 17:20

## 2025-06-13 RX ADMIN — Medication 2 MILLIGRAM(S): at 18:27

## 2025-06-13 RX ADMIN — Medication 2 MILLIGRAM(S): at 08:58

## 2025-06-13 RX ADMIN — Medication 325 MILLIGRAM(S): at 11:12

## 2025-06-13 RX ADMIN — Medication 650 MILLIGRAM(S): at 18:20

## 2025-06-13 RX ADMIN — Medication 1000 MILLILITER(S): at 10:03

## 2025-06-13 RX ADMIN — FOLIC ACID 1 MILLIGRAM(S): 1 TABLET ORAL at 08:48

## 2025-06-13 RX ADMIN — Medication 1 TABLET(S): at 11:13

## 2025-06-13 RX ADMIN — SODIUM CHLORIDE 125 MILLILITER(S): 9 INJECTION, SOLUTION INTRAVENOUS at 13:17

## 2025-06-13 RX ADMIN — Medication 40 MILLIGRAM(S): at 13:17

## 2025-06-13 RX ADMIN — Medication 2 MILLIGRAM(S): at 11:13

## 2025-06-13 RX ADMIN — KETOROLAC TROMETHAMINE 15 MILLIGRAM(S): 30 INJECTION, SOLUTION INTRAMUSCULAR; INTRAVENOUS at 06:25

## 2025-06-13 RX ADMIN — Medication 650 MILLIGRAM(S): at 04:40

## 2025-06-13 RX ADMIN — Medication 100 MILLIGRAM(S): at 08:48

## 2025-06-13 RX ADMIN — Medication 600 MILLIGRAM(S): at 08:49

## 2025-06-13 RX ADMIN — Medication 50 MILLIGRAM(S): at 00:34

## 2025-06-13 RX ADMIN — Medication 40 MILLIGRAM(S): at 09:00

## 2025-06-13 RX ADMIN — Medication 1000 MILLILITER(S): at 09:03

## 2025-06-13 RX ADMIN — Medication 25 MILLIGRAM(S): at 08:48

## 2025-06-13 RX ADMIN — Medication 1 MILLIGRAM(S): at 00:34

## 2025-06-13 NOTE — ED ADULT NURSE NOTE - OBJECTIVE STATEMENT
patient was just discharged from ER for abdominal pain, was drinking prior to previous ER visit as per wife. Patient came back to triage complaining of generalized body pain.

## 2025-06-13 NOTE — ED PROVIDER NOTE - CLINICAL SUMMARY MEDICAL DECISION MAKING FREE TEXT BOX
male w/EtOH use presenting to the ED for alcohol intoxication\    patient with early concerns for withdrawal  will administer benzos and reasses  will r/o electrolyte abnormalities

## 2025-06-13 NOTE — ED PROVIDER NOTE - MUSCULOSKELETAL, MLM
Spine appears normal, range of motion is not limited, no muscle or joint tenderness pt is ambulating with normal gaits without assists

## 2025-06-13 NOTE — ED ADULT NURSE REASSESSMENT NOTE - NSFALLRISKINTERV_ED_ALL_ED
Assistance OOB with selected safe patient handling equipment if applicable/Assistance with ambulation/Communicate fall risk and risk factors to all staff, patient, and family/Monitor gait and stability/Monitor for mental status changes and reorient to person, place, and time, as needed/Move patient closer to nursing station/within visual sight of ED staff/Provide visual cue: yellow wristband, yellow gown, etc/Reinforce activity limits and safety measures with patient and family/Toileting schedule using arm’s reach rule for commode and bathroom/Use of alarms - bed, stretcher, chair and/or video monitoring/Call bell, personal items and telephone in reach/Instruct patient to call for assistance before getting out of bed/chair/stretcher/Non-slip footwear applied when patient is off stretcher/[place] to call system/Physically safe environment - no spills, clutter or unnecessary equipment/Purposeful Proactive Rounding/Room/bathroom lighting operational, light cord in reach

## 2025-06-13 NOTE — H&P ADULT - ASSESSMENT
60 years old male with h/o alcohol abuse, liver cirrhosis present to ED with alcohol withdrawal. Patient has recurrent hospitalization for alcohol intoxication and alcohol withdrawal complicated by DT. Patient was seen in ED early AM for intoxication, was discharged but later patient developed alcohol withdrawal symptoms and came back to ED.  Tachycardic, afebrile, sat well at RA. No leukocytosis, plt 158, Cr 0.96, serum alcohol 77 ( 8:43AM, 6/13/25)

## 2025-06-13 NOTE — H&P ADULT - PROBLEM SELECTOR PLAN 2
Prior CT imaging with liver cirrhosis, likely due to alcohol use  Continue rifaximin and lactulose  Alcohol cessation education provided

## 2025-06-13 NOTE — ED ADULT NURSE REASSESSMENT NOTE - NS ED NURSE REASSESS COMMENT FT1
Patient noted intoxicated. Repeatedly gets out of stretcher despite constant redirection from staff.  Gait unsteady. Staff assisted patient back to stretcher to prevent fall. MD Cameron made aware that patient is a fall risk and unable to follow directions regarding fall safety precautions. Recommended higher level of observation to Cameron PADGETT. Will continue to provide care and monitor patient.

## 2025-06-13 NOTE — ED PROVIDER NOTE - PATIENT PORTAL LINK FT
You can access the FollowMyHealth Patient Portal offered by [facility] by registering at the following website: [email or web address]. By joining "[employer]’s FollowMyHealth portal, you will also be able to view your health information using other applications (apps) compatible with our system.

## 2025-06-13 NOTE — H&P ADULT - NSHPPHYSICALEXAM_GEN_ALL_CORE
CONSTITUTIONAL: alert and cooperative, no acute distress.  EYES: PERRL  ENT: Mucosa moist, tongue normal  NECK: Neck supple, trachea midline, non-tender  CARDIAC: Normal S1 and S2. Regular rate and rhythms. No Pedal edema  LUNGS: Equal air entry both lungs. No rales, rhonchi, wheezing. Normal respiratory effort.   ABDOMEN: Soft, nondistended, nontender. No guarding or rebound tenderness. No hepatomegaly or splenomegaly. Bowel sound normal.   MUSCULOSKELETAL: Normocephalic, atraumatic. No significant deformity or joint abnormality  NEUROLOGICAL: No gross motor or sensory deficits.  PSYCHIATRIC: A&O x 3, tremulous

## 2025-06-13 NOTE — PATIENT PROFILE ADULT - FALL HARM RISK - HARM RISK INTERVENTIONS
Assistance with ambulation/Assistance OOB with selected safe patient handling equipment/Communicate Risk of Fall with Harm to all staff/Discuss with provider need for PT consult/Monitor for mental status changes/Monitor gait and stability/Provide patient with walking aids - walker, cane, crutches/Reinforce activity limits and safety measures with patient and family/Tailored Fall Risk Interventions/Toileting schedule using arm’s reach rule for commode and bathroom/Use of alarms - bed, chair and/or voice tab/Visual Cue: Yellow wristband and red socks/Bed in lowest position, wheels locked, appropriate side rails in place/Call bell, personal items and telephone in reach/Instruct patient to call for assistance before getting out of bed or chair/Non-slip footwear when patient is out of bed/[place] to call system/Physically safe environment - no spills, clutter or unnecessary equipment/Purposeful Proactive Rounding/Room/bathroom lighting operational, light cord in reach

## 2025-06-13 NOTE — ED ADULT TRIAGE NOTE - CHIEF COMPLAINT QUOTE
pt c/o generalize body pain and shaking. pt was just discharged for the ED. history of alcohol abuse.

## 2025-06-13 NOTE — PATIENT PROFILE ADULT - FUNCTIONAL ASSESSMENT - DAILY ACTIVITY 1.
PT called and lvm, reached out to pt  PT was looking for np appointment, inform pt of the wait list and at this time pt is in not interested  4 = No assist / stand by assistance

## 2025-06-13 NOTE — ED PROVIDER NOTE - CLINICAL SUMMARY MEDICAL DECISION MAKING FREE TEXT BOX
60 years old male walked in from the triage c/o generalized body aches. Pt was just discharged from here for alcohol intoxication at 6:10 am. According to sun rise pt had wbc 654, h/h 10/3204 platelet 178 alcohol 237 na 143 K 3.6 bun 12 cr 0.97 this morning. Pt is alert and oriented x 3 CIWA of six. Pt is in alcohol withdraw

## 2025-06-13 NOTE — ED PROVIDER NOTE - EKG #1 DATE/TIME
Administered By (Optional): Bhavana Mercado PA-C Administered By (Optional): Bhavana eMrcado PA-C 13-Jun-2025 09:00

## 2025-06-13 NOTE — ED ADULT NURSE NOTE - NS ED NURSE LEVEL OF CONSCIOUSNESS SPEECH
14.9   4.66  )-----------( 202      ( 20 Mar 2019 23:00 )             43.2   03-20    136  |  101  |  10  ----------------------------<  114<H>  3.7   |  23  |  0.82    Ca    9.5      20 Mar 2019 23:00    TPro  7.2  /  Alb  3.8  /  TBili  0.4  /  DBili  x   /  AST  19  /  ALT  22  /  AlkPhos  119  03-20 Speaking Coherently/Age appropriate

## 2025-06-13 NOTE — ED PROVIDER NOTE - OBJECTIVE STATEMENT
60 M pmh EtOH use disorder presenting to the ED for diffuse body pain. Pt states that he has not been drinking for over a week but he has not been feeling well.   + vomiting at home. Pt denies chest pain or trouble breathing

## 2025-06-13 NOTE — ED PROVIDER NOTE - PHYSICAL EXAMINATION
General: Well appearing female in no acute distress  HEENT: Normocephalic, atraumatic. Moist mucous membranes. Oropharynx clear. No lymphadenopathy.  Eyes: No scleral icterus. EOMI. ANDREA.  Neck:. Soft and supple. Full ROM without pain. No midline tenderness  Cardiac: Regular rate and regular rhythm. No murmurs, rubs, gallops. Peripheral pulses 2+ and symmetric. No LE edema.  Resp: Lungs CTAB. Speaking in full sentences. No wheezes, rales or rhonchi.  Abd: Soft, non-tender, non-distended. No guarding or rebound.   Back: Spine midline and non-tender. No CVA tenderness.    Skin: No rashes, abrasions, or lacerations.  Neuro: AO x 3. Moves all extremities symmetrically. Motor strength and sensation grossly intact. ambulatory w/steady gait

## 2025-06-13 NOTE — ED PROVIDER NOTE - CONSTITUTIONAL, MLM
normal... Well appearing, awake, alert, oriented to person, place, time/situation and in no apparent distress. speaking in clear full sentences no nasal flaring no shoulders retractions no diaphoresis

## 2025-06-13 NOTE — H&P ADULT - PROBLEM SELECTOR PLAN 1
heavy alcohol with frequent admission for alcohol intoxication and alcohol withdrawal   serum alcohol 77 ( 8:43AM, 6/13/25)  Check Utox  CIWA protocol with oral ativan taper and symptoms trigger ativan. Monitor for sedation  IV fluid  thiamine, folic acid, multivitamin  Closely monitor for DT

## 2025-06-13 NOTE — ED ADULT NURSE NOTE - NSFALLHARMRISKINTERV_ED_ALL_ED
Communicate risk of Fall with Harm to all staff, patient, and family/Provide visual cue: red socks, yellow wristband, yellow gown, etc/Reinforce activity limits and safety measures with patient and family/Use of alarms - bed, stretcher, chair and/or video monitoring/Bed in lowest position, wheels locked, appropriate side rails in place/Call bell, personal items and telephone in reach/Instruct patient to call for assistance before getting out of bed/chair/stretcher/Non-slip footwear applied when patient is off stretcher/[place] to call system/Physically safe environment - no spills, clutter or unnecessary equipment/Purposeful Proactive Rounding/Room/bathroom lighting operational, light cord in reach

## 2025-06-14 ENCOUNTER — TRANSCRIPTION ENCOUNTER (OUTPATIENT)
Age: 60
End: 2025-06-14

## 2025-06-14 LAB
ALBUMIN SERPL ELPH-MCNC: 2.6 G/DL — LOW (ref 3.3–5)
ALP SERPL-CCNC: 66 U/L — SIGNIFICANT CHANGE UP (ref 40–120)
ALT FLD-CCNC: 55 U/L — SIGNIFICANT CHANGE UP (ref 12–78)
ANION GAP SERPL CALC-SCNC: 5 MMOL/L — SIGNIFICANT CHANGE UP (ref 5–17)
AST SERPL-CCNC: 55 U/L — HIGH (ref 15–37)
BILIRUB SERPL-MCNC: 1.1 MG/DL — SIGNIFICANT CHANGE UP (ref 0.2–1.2)
BUN SERPL-MCNC: 11 MG/DL — SIGNIFICANT CHANGE UP (ref 7–23)
CALCIUM SERPL-MCNC: 8.1 MG/DL — LOW (ref 8.5–10.1)
CHLORIDE SERPL-SCNC: 108 MMOL/L — SIGNIFICANT CHANGE UP (ref 96–108)
CO2 SERPL-SCNC: 24 MMOL/L — SIGNIFICANT CHANGE UP (ref 22–31)
CREAT SERPL-MCNC: 0.88 MG/DL — SIGNIFICANT CHANGE UP (ref 0.5–1.3)
EGFR: 100 ML/MIN/1.73M2 — SIGNIFICANT CHANGE UP
EGFR: 100 ML/MIN/1.73M2 — SIGNIFICANT CHANGE UP
GLUCOSE SERPL-MCNC: 152 MG/DL — HIGH (ref 70–99)
HCT VFR BLD CALC: 28.9 % — LOW (ref 39–50)
HGB BLD-MCNC: 8.9 G/DL — LOW (ref 13–17)
MAGNESIUM SERPL-MCNC: 1.6 MG/DL — SIGNIFICANT CHANGE UP (ref 1.6–2.6)
MCHC RBC-ENTMCNC: 21.7 PG — LOW (ref 27–34)
MCHC RBC-ENTMCNC: 30.8 G/DL — LOW (ref 32–36)
MCV RBC AUTO: 70.5 FL — LOW (ref 80–100)
NRBC BLD AUTO-RTO: 0 /100 WBCS — SIGNIFICANT CHANGE UP (ref 0–0)
PHOSPHATE SERPL-MCNC: 1.6 MG/DL — LOW (ref 2.5–4.5)
PLATELET # BLD AUTO: 126 K/UL — LOW (ref 150–400)
POTASSIUM SERPL-MCNC: 3.1 MMOL/L — LOW (ref 3.5–5.3)
POTASSIUM SERPL-SCNC: 3.1 MMOL/L — LOW (ref 3.5–5.3)
PROT SERPL-MCNC: 6.5 GM/DL — SIGNIFICANT CHANGE UP (ref 6–8.3)
RBC # BLD: 4.1 M/UL — LOW (ref 4.2–5.8)
RBC # FLD: 26.8 % — HIGH (ref 10.3–14.5)
SODIUM SERPL-SCNC: 137 MMOL/L — SIGNIFICANT CHANGE UP (ref 135–145)
WBC # BLD: 2.87 K/UL — LOW (ref 3.8–10.5)
WBC # FLD AUTO: 2.87 K/UL — LOW (ref 3.8–10.5)

## 2025-06-14 PROCEDURE — 99231 SBSQ HOSP IP/OBS SF/LOW 25: CPT

## 2025-06-14 PROCEDURE — 99232 SBSQ HOSP IP/OBS MODERATE 35: CPT

## 2025-06-14 RX ORDER — PHENOBARBITAL 30 MG/1
130 TABLET ORAL ONCE
Refills: 0 | Status: DISCONTINUED | OUTPATIENT
Start: 2025-06-14 | End: 2025-06-14

## 2025-06-14 RX ORDER — OLANZAPINE 10 MG/1
2.5 TABLET ORAL ONCE
Refills: 0 | Status: COMPLETED | OUTPATIENT
Start: 2025-06-14 | End: 2025-06-14

## 2025-06-14 RX ORDER — ACETAMINOPHEN 500 MG/5ML
1000 LIQUID (ML) ORAL ONCE
Refills: 0 | Status: COMPLETED | OUTPATIENT
Start: 2025-06-14 | End: 2025-06-14

## 2025-06-14 RX ORDER — SUCRALFATE 1 G
1 TABLET ORAL EVERY 6 HOURS
Refills: 0 | Status: DISCONTINUED | OUTPATIENT
Start: 2025-06-14 | End: 2025-06-15

## 2025-06-14 RX ORDER — POTASSIUM PHOSPHATE, MONOBASIC POTASSIUM PHOSPHATE, DIBASIC INJECTION, 236; 224 MG/ML; MG/ML
30 SOLUTION, CONCENTRATE INTRAVENOUS ONCE
Refills: 0 | Status: COMPLETED | OUTPATIENT
Start: 2025-06-14 | End: 2025-06-14

## 2025-06-14 RX ADMIN — PHENOBARBITAL 130 MILLIGRAM(S): 30 TABLET ORAL at 19:12

## 2025-06-14 RX ADMIN — Medication 325 MILLIGRAM(S): at 11:32

## 2025-06-14 RX ADMIN — Medication 2 MILLIGRAM(S): at 00:48

## 2025-06-14 RX ADMIN — Medication 1.5 MILLIGRAM(S): at 23:38

## 2025-06-14 RX ADMIN — Medication 1 GRAM(S): at 23:39

## 2025-06-14 RX ADMIN — Medication 1 GRAM(S): at 18:22

## 2025-06-14 RX ADMIN — Medication 1000 MILLIGRAM(S): at 19:42

## 2025-06-14 RX ADMIN — FOLIC ACID 1 MILLIGRAM(S): 1 TABLET ORAL at 11:30

## 2025-06-14 RX ADMIN — Medication 1.5 MILLIGRAM(S): at 11:30

## 2025-06-14 RX ADMIN — POTASSIUM PHOSPHATE, MONOBASIC POTASSIUM PHOSPHATE, DIBASIC INJECTION, 83.33 MILLIMOLE(S): 236; 224 SOLUTION, CONCENTRATE INTRAVENOUS at 13:37

## 2025-06-14 RX ADMIN — Medication 100 MILLIGRAM(S): at 11:30

## 2025-06-14 RX ADMIN — Medication 400 MILLIGRAM(S): at 19:12

## 2025-06-14 RX ADMIN — Medication 2 MILLIGRAM(S): at 19:51

## 2025-06-14 RX ADMIN — Medication 2 MILLIGRAM(S): at 18:47

## 2025-06-14 RX ADMIN — Medication 2 MILLIGRAM(S): at 06:24

## 2025-06-14 RX ADMIN — Medication 40 MILLIGRAM(S): at 18:22

## 2025-06-14 RX ADMIN — LACTULOSE 20 GRAM(S): 10 SOLUTION ORAL at 06:24

## 2025-06-14 RX ADMIN — Medication 1.5 MILLIGRAM(S): at 17:17

## 2025-06-14 RX ADMIN — Medication 40 MILLIEQUIVALENT(S): at 13:37

## 2025-06-14 RX ADMIN — OLANZAPINE 2.5 MILLIGRAM(S): 10 TABLET ORAL at 20:23

## 2025-06-14 RX ADMIN — Medication 1 TABLET(S): at 11:30

## 2025-06-14 RX ADMIN — Medication 40 MILLIGRAM(S): at 06:24

## 2025-06-14 NOTE — PROGRESS NOTE ADULT - REASON FOR ADMISSION
alcohol withdrawal syndrome [Fever] : no fever [Night Sweats] : no night sweats [Nasal Discharge] : no nasal discharge [Sore Throat] : no sore throat [Chest Pain] : no chest pain [Palpitations] : no palpitations [Cough] : no cough [Dyspnea on Exertion] : dyspnea on exertion [Abdominal Pain] : no abdominal pain [Vomiting] : no vomiting [Dysuria] : no dysuria [Hematuria] : no hematuria [Joint Pain] : no joint pain [Muscle Pain] : no muscle pain [Itching] : no itching [Skin Rash] : no skin rash [Headache] : no headache [Memory Loss] : no memory loss

## 2025-06-14 NOTE — PROGRESS NOTE ADULT - SUBJECTIVE AND OBJECTIVE BOX
CHIEF COMPLAINT: Follow up of acute alcohol withdrawal.   no chest pain   + abd pain  no n/v  no active gross bleeding   hCIWA score improving  Hx of severe alcohol withdrawals and DTs.       PHYSICAL EXAM:    GENERAL: Moderately built, no acute distress   CHEST/LUNG:  No wheezing, no crackles   HEART: Regular rate and rhythm; No murmurs  ABDOMEN: Soft, Nontender, Nondistended; Bowel sounds present  EXTREMITIES:  No clubbing, cyanosis, or edema  Psychiatry: AAO x 3, mood is appropriate       OBJECTIVE DATA:   Vital Signs Last 24 Hrs  T(C): 36.7 (2025 05:04), Max: 37.1 (2025 11:17)  T(F): 98.1 (2025 05:04), Max: 98.7 (2025 11:17)  HR: 63 (2025 05:04) (63 - 95)  BP: 113/64 (2025 05:04) (113/64 - 157/81)  BP(mean): --  RR: 17 (2025 05:04) (17 - 18)  SpO2: 98% (2025 05:04) (96% - 100%)    Parameters below as of 2025 05:04  Patient On (Oxygen Delivery Method): room air               Daily     Daily Weight in k.1 (2025 11:35)  LABS:                        9.7    6.35  )-----------( 158      ( 2025 08:43 )             31.3             06-13    143  |  111[H]  |  15  ----------------------------<  87  3.9   |  20[L]  |  0.96    Ca    8.7      2025 08:43  Mg     1.8     06-13    TPro  7.7  /  Alb  3.3  /  TBili  1.6[H]  /  DBili  x   /  AST  90[H]  /  ALT  81[H]  /  AlkPhos  77  06-13                Urinalysis Basic - ( 2025 08:43 )    Color: x / Appearance: x / SG: x / pH: x  Gluc: 87 mg/dL / Ketone: x  / Bili: x / Urobili: x   Blood: x / Protein: x / Nitrite: x   Leuk Esterase: x / RBC: x / WBC x   Sq Epi: x / Non Sq Epi: x / Bacteria: x            CAPILLARY BLOOD GLUCOSE      POCT Blood Glucose.: 101 mg/dL (2025 21:38)          MEDICATIONS  (STANDING):  ferrous    sulfate 325 milliGRAM(s) Oral daily  folic acid 1 milliGRAM(s) Oral daily  lactated ringers. 1000 milliLiter(s) (125 mL/Hr) IV Continuous <Continuous>  lactulose Syrup 20 Gram(s) Oral two times a day  LORazepam     Tablet   Oral   LORazepam     Tablet 1.5 milliGRAM(s) Oral every 6 hours  multivitamin 1 Tablet(s) Oral daily  pantoprazole    Tablet 40 milliGRAM(s) Oral before breakfast  rifAXIMin 550 milliGRAM(s) Oral two times a day  thiamine 100 milliGRAM(s) Oral daily    MEDICATIONS  (PRN):  LORazepam     Tablet 2 milliGRAM(s) Oral every 1 hour PRN CIWA 8-14  LORazepam   Injectable 2 milliGRAM(s) IV Push every 2 hours PRN CIWA >15, or seizure  melatonin 3 milliGRAM(s) Oral at bedtime PRN Insomnia  ondansetron Injectable 4 milliGRAM(s) IV Push every 6 hours PRN Nausea and/or Vomiting

## 2025-06-14 NOTE — CHART NOTE - NSCHARTNOTEFT_GEN_A_CORE
Patient noted with CIWA at 10.   C/O   VANDANA VILLA is a 60y old male Notified by RN patient C/O chest pain. Pt seen and evaluated at bedside. "The pain began  5 minutes ago and happens all the time".  The pain is 5/10 epigastric area  severity, and does not radiate. The pain occurs during laying in bed spontaneously.  The chest pain is not associated with palpitations, shortness of breath, diaphoresis, lightheadedness, or nausea.    Vital Signs Last 24 Hrs  T(C): 36.8 (14 Jun 2025 16:58), Max: 36.8 (13 Jun 2025 23:23)  T(F): 98.2 (14 Jun 2025 16:58), Max: 98.2 (13 Jun 2025 23:23)  HR: 79 (14 Jun 2025 18:37) (55 - 79)  BP: 124/81 (14 Jun 2025 18:37) (113/64 - 157/84)  BP(mean): --  RR: 18 (14 Jun 2025 16:58) (17 - 18)  SpO2: 99% (14 Jun 2025 16:58) (97% - 100%)    Parameters below as of 14 Jun 2025 11:04  Patient On (Oxygen Delivery Method): room air        Physical Exam:  General- lying comfortably in bed in NAD  Cardiac- normal S1, S2, RRR, no JVD  Resp- Equal and unlabored, CTA bilaterally   Abdomen- soft non-tender, non-distended +BS  Extremities- trace edema bilaterally on LE.    ASSESSMENT/PLAN: VANDANA VILLA is a 60y old male with chest pain.     1) Chest pain  - Likely 2/2  gastritis and prolonged etoh use or reflux.  - The history, physical exam, EKG showing NSR. Not ACS as pain is reproducible in epigastric area.  -Ciwa is a 10 and patient is c/o generalized abdominal pain all over his body.  -Ofirmev given for generalized pain   - I discussed at length with the patient the more common causes of chest pain. No cardiology follow-up is required unless new concerns arise.  - Discussed with Dr Bernstein who agrees with plan above.   - Endorsed to oncoming ACP  Case Discussed with Dr Lebron     40 mins assessing presenting problems of acute illness. Medical decision making including Initiating plan of care, reviewing data, reviewing radiology, discussing with multidisciplinary team, non inclusive of procedures, discussing goals of care with patient/family

## 2025-06-14 NOTE — PROGRESS NOTE ADULT - ASSESSMENT
60 years old male with h/o alcohol abuse, liver cirrhosis present to ED with alcohol withdrawal. Patient has recurrent hospitalization for alcohol intoxication and alcohol withdrawal complicated by DT. Patient was seen in ED early AM for intoxication, was discharged but later patient developed alcohol withdrawal symptoms and came back to ED.  Tachycardic, afebrile, sat well at RA. No leukocytosis, plt 158, Cr 0.96, serum alcohol 77 ( 8:43AM, 6/13/25)      Problem/Plan - 1:  ·  Problem: Acute Alcohol withdrawal syndrome.   ·  Plan: heavy alcohol with frequent admission for alcohol intoxication and alcohol withdrawal   serum alcohol 77 ( 8:43AM, 6/13/25)  Cont CIWA protocol with oral ativan taper and symptoms trigger ativan. Monitor for sedation  IV fluid  thiamine, folic acid, multivitamin  Closely monitor for DT.    Problem/Plan - 2:  ·  Problem: Liver cirrhosis, alcoholic with coagulopathy, hyperbilirubinemia and chronic transaminitis.    ·  Plan: Prior CT imaging with liver cirrhosis, likely due to alcohol use  Continue rifaximin and lactulose  Alcohol cessation education provided.    microcytic anemia. cont feosol.     FC    Time spent by me managing the patient including but not limited to reviewing the chart, discussion with the IDR team (nurse//care manager/ACP), physical exam and assessment and plan is 36 mins    60 years old male with h/o alcohol abuse, liver cirrhosis present to ED with alcohol withdrawal. Patient has recurrent hospitalization for alcohol intoxication and alcohol withdrawal complicated by DT. Patient was seen in ED early AM for intoxication, was discharged but later patient developed alcohol withdrawal symptoms and came back to ED.  Tachycardic, afebrile, sat well at RA. No leukocytosis, plt 158, Cr 0.96, serum alcohol 77 ( 8:43AM, 6/13/25)      Problem/Plan - 1:  ·  Problem: Acute Alcohol withdrawal syndrome.   ·  Plan: heavy alcohol with frequent admission for alcohol intoxication and alcohol withdrawal   serum alcohol 77 ( 8:43AM, 6/13/25)  Cont CIWA protocol with oral ativan taper and symptoms trigger ativan. Monitor for sedation  IV fluid  thiamine, folic acid, multivitamin  Closely monitor for DT.    Problem/Plan - 2:  ·  Problem: Liver cirrhosis, alcoholic with coagulopathy, hyperbilirubinemia and chronic transaminitis.    ·  Plan: Prior CT imaging with liver cirrhosis, likely due to alcohol use  Continue rifaximin and lactulose  Alcohol cessation education provided.    Microcytic anemia. cont feosol.     Epigastric pain. Likely from alcohol gastritis. cont PPI. Avoid narcotics. Discussed with nurse.     FC    Time spent by me managing the patient including but not limited to reviewing the chart, discussion with the IDR team (nurse//care manager/ACP), physical exam and assessment and plan is 36 mins

## 2025-06-15 VITALS
HEART RATE: 72 BPM | OXYGEN SATURATION: 100 % | RESPIRATION RATE: 17 BRPM | DIASTOLIC BLOOD PRESSURE: 90 MMHG | SYSTOLIC BLOOD PRESSURE: 164 MMHG | TEMPERATURE: 97 F

## 2025-06-15 LAB
ANION GAP SERPL CALC-SCNC: 6 MMOL/L — SIGNIFICANT CHANGE UP (ref 5–17)
BILIRUB SERPL-MCNC: 0.9 MG/DL — SIGNIFICANT CHANGE UP (ref 0.2–1.2)
BUN SERPL-MCNC: 6 MG/DL — LOW (ref 7–23)
CALCIUM SERPL-MCNC: 8.5 MG/DL — SIGNIFICANT CHANGE UP (ref 8.5–10.1)
CHLORIDE SERPL-SCNC: 111 MMOL/L — HIGH (ref 96–108)
CO2 SERPL-SCNC: 24 MMOL/L — SIGNIFICANT CHANGE UP (ref 22–31)
CREAT SERPL-MCNC: 0.89 MG/DL — SIGNIFICANT CHANGE UP (ref 0.5–1.3)
EGFR: 99 ML/MIN/1.73M2 — SIGNIFICANT CHANGE UP
EGFR: 99 ML/MIN/1.73M2 — SIGNIFICANT CHANGE UP
GLUCOSE SERPL-MCNC: 88 MG/DL — SIGNIFICANT CHANGE UP (ref 70–99)
INR BLD: 1.2 RATIO — HIGH (ref 0.85–1.16)
MAGNESIUM SERPL-MCNC: 1.6 MG/DL — SIGNIFICANT CHANGE UP (ref 1.6–2.6)
MELD SCORE WITH DIALYSIS: 22 POINTS — SIGNIFICANT CHANGE UP
MELD SCORE WITHOUT DIALYSIS: 8 POINTS — SIGNIFICANT CHANGE UP
PHOSPHATE SERPL-MCNC: 3.2 MG/DL — SIGNIFICANT CHANGE UP (ref 2.5–4.5)
POTASSIUM SERPL-MCNC: 3.5 MMOL/L — SIGNIFICANT CHANGE UP (ref 3.5–5.3)
POTASSIUM SERPL-SCNC: 3.5 MMOL/L — SIGNIFICANT CHANGE UP (ref 3.5–5.3)
PROTHROM AB SERPL-ACNC: 13.9 SEC — HIGH (ref 9.9–13.4)
SODIUM SERPL-SCNC: 141 MMOL/L — SIGNIFICANT CHANGE UP (ref 135–145)

## 2025-06-15 PROCEDURE — 99232 SBSQ HOSP IP/OBS MODERATE 35: CPT

## 2025-06-15 PROCEDURE — 99239 HOSP IP/OBS DSCHRG MGMT >30: CPT

## 2025-06-15 RX ADMIN — FOLIC ACID 1 MILLIGRAM(S): 1 TABLET ORAL at 11:58

## 2025-06-15 RX ADMIN — Medication 1 GRAM(S): at 11:59

## 2025-06-15 RX ADMIN — LACTULOSE 20 GRAM(S): 10 SOLUTION ORAL at 06:03

## 2025-06-15 RX ADMIN — Medication 100 MILLIGRAM(S): at 11:58

## 2025-06-15 RX ADMIN — Medication 40 MILLIGRAM(S): at 06:03

## 2025-06-15 RX ADMIN — Medication 325 MILLIGRAM(S): at 11:58

## 2025-06-15 RX ADMIN — Medication 1 GRAM(S): at 06:15

## 2025-06-15 RX ADMIN — Medication 1 TABLET(S): at 11:58

## 2025-06-15 RX ADMIN — Medication 1.5 MILLIGRAM(S): at 06:03

## 2025-06-15 NOTE — DISCHARGE NOTE NURSING/CASE MANAGEMENT/SOCIAL WORK - FINANCIAL ASSISTANCE
[facility] provides services at a reduced cost to those who are determined to be eligible through [facility]’s financial assistance program. Information regarding [facility]’s financial assistance program can be found by going to [email or web address] or by calling [phone].

## 2025-06-15 NOTE — DISCHARGE NOTE PROVIDER - HOSPITAL COURSE
60 years old male with h/o alcohol abuse, liver cirrhosis present to ED with alcohol withdrawal. Patient has recurrent hospitalization for alcohol intoxication and alcohol withdrawal complicated by DT. Patient was seen in ED early AM for intoxication, was discharged but later patient developed alcohol withdrawal symptoms and came back to ED.  Tachycardic, afebrile, sat well at RA. No leukocytosis, plt 158, Cr 0.96, serum alcohol 77 ( 8:43AM, 6/13/25)      Problem/Plan - 1:  ·  Problem: Acute Alcohol withdrawal syndrome.   ·  Plan: heavy alcohol with frequent admission for alcohol intoxication and alcohol withdrawal   serum alcohol 77 ( 8:43AM, 6/13/25)  withdrawal symptoms better. Patient is threatening to leave AMA. Discussed about recurrent alcohol abuse and its complications. Discussed about risks of alcohol withdrawal. patient verbalized understanding and agreed to come back if any worsening withdrawal symptoms.   ok to dc home and advised to have follow up with PCP.     Problem/Plan - 2:  ·  Problem: Liver cirrhosis, alcoholic with coagulopathy, hyperbilirubinemia and chronic transaminitis.    ·  Plan: Prior CT imaging with liver cirrhosis, likely due to alcohol use  Continue rifaximin and lactulose  Alcohol cessation education provided.    Microcytic anemia. cont feosol.     Epigastric pain. Likely from alcohol gastritis. cont PPI.    FC    Seen and examined by me today. Vitals stable.   I have discussed all the inpatient radiographic findings with the patient and stressed that patient follows with the PCP for further outpatient care. I have educated patient to use [facility] patient portal (as instructed on the discharge paperwork) to access medical records outside the hospital.   All questions welcomed and answered appropriately. Patient verbalized understanding of post discharge physician's follows up and discharge instructions.   DC time spent by me face to face excluding billable procedures 38 mins

## 2025-06-15 NOTE — DISCHARGE NOTE NURSING/CASE MANAGEMENT/SOCIAL WORK - NSDCVIVACCINE_GEN_ALL_CORE_FT
COVID-19, mRNA, LNP-S, PF, 100 mcg/ 0.5 mL dose (Moderna); 10-Jovan-2021 15:38; Reji Hernandez (RN); Moderna [employer] [id number] (Exp. Date: 13-Jul-2021); IntraMuscular; Deltoid Right.; 0.5 milliLiter(s);   influenza, injectable, quadrivalent, preservative free; 31-Jan-2019 15:53; Ayaka Bynum (RN); [employer]; [id number] (Exp. Date: 30-Jun-2019); IntraMuscular; Deltoid Right.; 0.5 milliLiter(s); VIS (VIS Published: 07-Aug-2015, VIS Presented: 31-Jan-2019);   influenza, injectable, quadrivalent, preservative free; 10-Nov-2019 14:13; Laverne Lane (RN); [employer]; [id number] (Exp. Date: 30-Jun-2020); IntraMuscular; Deltoid Left.; 0.5 milliLiter(s); VIS (VIS Published: 15-Aug-2019, VIS Presented: 10-Nov-2019);   influenza, injectable, quadrivalent, preservative free; 13-Oct-2020 11:56; Kimberli Cronin (RN); [employer]; [id number] (Exp. Date: 30-Jun-2021); IntraMuscular; Deltoid Right.; 0.5 milliLiter(s); VIS (VIS Published: 15-Aug-2019, VIS Presented: 13-Oct-2020);   Pneumococcal conjugate vaccine 20-valent (PCV20), polysaccharide [id number] conjugate, adjuvant, preserv; 04-Oct-2024 21:44; Colby Mckeon (RN); Pfizer[employer] Inc; [id number] (Exp. Date: 04-May-2025); IntraMuscular; Deltoid Left.; 0.5 milliLiter(s); VIS (VIS Published: 12-May-2023, VIS Presented: 04-Oct-2024);   Td (adult) preservative free; 18-Jan-2020 20:04; Yulia Vance (RN); [email or web address]; [id number] (Exp. Date: 19-Jan-2021); IntraMuscular; Deltoid Right.; 0.5 milliLiter(s); VIS (VIS Published: 18-Jan-2020, VIS Presented: 18-Jan-2020);

## 2025-06-15 NOTE — DISCHARGE NOTE NURSING/CASE MANAGEMENT/SOCIAL WORK - PATIENT PORTAL LINK FT
You can access the FollowMyHealth Patient Portal offered by [facility] by registering at the following website: [email or web address]. By joining [facility]’s FollowMyHealth portal, you will also be able to view your health information using other applications (apps) compatible with our system.

## 2025-06-15 NOTE — DISCHARGE NOTE NURSING/CASE MANAGEMENT/SOCIAL WORK - NSDCPEFALRISK_GEN_ALL_CORE
For information on Fall & Injury Prevention, visit: [email or web address] OR  [email or web address] OR  https://www.cdc.gov/steadi/patient.html

## 2025-06-15 NOTE — DISCHARGE NOTE PROVIDER - NSDCMRMEDTOKEN_GEN_ALL_CORE_FT
ferrous sulfate 325 mg (65 mg elemental iron) oral tablet: 1 tab(s) orally every other day Mon 6am, Wed 6 am, Fri 6am  folic acid 1 mg oral tablet: 1 tab(s) orally once a day  lactulose 10 g/15 mL oral syrup: 30 milliliter(s) orally 2 times a day up to 2-3 BMs per day  Multiple Vitamins oral tablet: 1 tab(s) orally once a day  pantoprazole 40 mg oral delayed release tablet: 1 tab(s) orally once a day (before a meal)  rifAXIMin 550 mg oral tablet: 1 tab(s) orally 2 times a day  thiamine 100 mg oral tablet: 1 tab(s) orally once a day

## 2025-06-15 NOTE — DISCHARGE NOTE PROVIDER - NSDCFUADDINST_GEN_ALL_CORE_FT
1) It is important to see your primary physician within next 7-10 days to perform a comprehensive medical review.  Call their offices for an appointment as soon as you leave the hospital.  If you do not have a primary physician or unable to reach your PCP, contact the [facility] Physician Referral Service at [phone].  Your medical issues appear to be stable at this time, but if your symptoms recur or worsen, contact your physicians and/or return to the hospital if necessary.  If you encounter any issues or questions with your medication, call your physicians before stopping the medication.    2) Please access [facility] Patient portal (as instructed on the discharge paperwork) to access your medical records at any time after discharge.

## 2025-06-20 ENCOUNTER — TRANSCRIPTION ENCOUNTER (OUTPATIENT)
Age: 60
End: 2025-06-20

## 2025-06-20 DIAGNOSIS — F10.230 ALCOHOL DEPENDENCE WITH WITHDRAWAL, UNCOMPLICATED: ICD-10-CM
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
## 2025-06-20 DIAGNOSIS — D50.9 IRON DEFICIENCY ANEMIA, UNSPECIFIED: ICD-10-CM

## 2025-06-20 DIAGNOSIS — K70.30 ALCOHOLIC CIRRHOSIS OF LIVER WITHOUT ASCITES: ICD-10-CM

## 2025-06-20 DIAGNOSIS — K27.9 PEPTIC ULCER, SITE UNSPECIFIED, UNSPECIFIED AS ACUTE OR CHRONIC, WITHOUT HEMORRHAGE OR PERFORATION: ICD-10-CM

## 2025-06-20 DIAGNOSIS — F10.220 ALCOHOL DEPENDENCE WITH INTOXICATION, UNCOMPLICATED: ICD-10-CM

## 2025-06-20 DIAGNOSIS — D68.8 OTHER SPECIFIED COAGULATION DEFECTS: ICD-10-CM

## 2025-06-20 DIAGNOSIS — E80.7 DISORDER OF BILIRUBIN METABOLISM, UNSPECIFIED: ICD-10-CM

## 2025-06-20 DIAGNOSIS — K29.20 ALCOHOLIC GASTRITIS WITHOUT BLEEDING: ICD-10-CM

## 2025-06-25 NOTE — SWALLOW BEDSIDE ASSESSMENT ADULT - COMMENTS
XRAY Chest 04/07/3019 Impression: NG tip in the stomach. The heart is unremarkable. The lungs   are clear. Bones are unremarkable for age.    Recent MBS testing at this hospital 1/31/2019 REC regular consistency and thin liquids.

## 2025-06-29 NOTE — ED ADULT NURSE NOTE - DRUG PRE-SCREENING (DAST -1)
Resp Therapist, Dr. Rick, and Charge at bedside. Pt is now more alert, following commands, and wants to hold off on intubation. Will reassess in 1 hour. Dr. Rick has already spoken w/ family and they are still unable to make a decision based on patients Hospice/ DNI care. Will continue to be full code until otherwise.    Statement Selected

## 2025-07-09 ENCOUNTER — INPATIENT (INPATIENT)
Facility: HOSPITAL | Age: 60
LOS: 4 days | Discharge: HOME | End: 2025-07-14
Attending: INTERNAL MEDICINE | Admitting: INTERNAL MEDICINE
Payer: MEDICAID

## 2025-07-09 VITALS
HEIGHT: 67 IN | RESPIRATION RATE: 18 BRPM | WEIGHT: 175.05 LBS | OXYGEN SATURATION: 100 % | DIASTOLIC BLOOD PRESSURE: 90 MMHG | SYSTOLIC BLOOD PRESSURE: 144 MMHG | TEMPERATURE: 98 F | HEART RATE: 102 BPM

## 2025-07-09 DIAGNOSIS — K29.20 ALCOHOLIC GASTRITIS WITHOUT BLEEDING: ICD-10-CM

## 2025-07-09 DIAGNOSIS — F10.939 ALCOHOL USE, UNSPECIFIED WITH WITHDRAWAL, UNSPECIFIED: ICD-10-CM

## 2025-07-09 DIAGNOSIS — K70.30 ALCOHOLIC CIRRHOSIS OF LIVER WITHOUT ASCITES: ICD-10-CM

## 2025-07-09 LAB
ALBUMIN SERPL ELPH-MCNC: 3.9 G/DL — SIGNIFICANT CHANGE UP (ref 3.3–5)
ALP SERPL-CCNC: 74 U/L — SIGNIFICANT CHANGE UP (ref 40–120)
ALT FLD-CCNC: 42 U/L — SIGNIFICANT CHANGE UP (ref 12–78)
ANION GAP SERPL CALC-SCNC: 17 MMOL/L — SIGNIFICANT CHANGE UP (ref 5–17)
AST SERPL-CCNC: 64 U/L — HIGH (ref 15–37)
BASOPHILS # BLD AUTO: 0.06 K/UL — SIGNIFICANT CHANGE UP (ref 0–0.2)
BASOPHILS NFR BLD AUTO: 1.4 % — SIGNIFICANT CHANGE UP (ref 0–2)
BILIRUB SERPL-MCNC: 0.8 MG/DL — SIGNIFICANT CHANGE UP (ref 0.2–1.2)
BUN SERPL-MCNC: 13 MG/DL — SIGNIFICANT CHANGE UP (ref 7–23)
CALCIUM SERPL-MCNC: 9.6 MG/DL — SIGNIFICANT CHANGE UP (ref 8.5–10.1)
CHLORIDE SERPL-SCNC: 105 MMOL/L — SIGNIFICANT CHANGE UP (ref 96–108)
CO2 SERPL-SCNC: 23 MMOL/L — SIGNIFICANT CHANGE UP (ref 22–31)
CREAT SERPL-MCNC: 1.22 MG/DL — SIGNIFICANT CHANGE UP (ref 0.5–1.3)
EGFR: 69 ML/MIN/1.73M2 — SIGNIFICANT CHANGE UP
EGFR: 69 ML/MIN/1.73M2 — SIGNIFICANT CHANGE UP
EOSINOPHIL # BLD AUTO: 0.11 K/UL — SIGNIFICANT CHANGE UP (ref 0–0.5)
EOSINOPHIL NFR BLD AUTO: 2.6 % — SIGNIFICANT CHANGE UP (ref 0–6)
ETHANOL SERPL-MCNC: 237 MG/DL — HIGH (ref 0–10)
GLUCOSE BLDC GLUCOMTR-MCNC: 143 MG/DL — HIGH (ref 70–99)
GLUCOSE SERPL-MCNC: 113 MG/DL — HIGH (ref 70–99)
HCT VFR BLD CALC: 41.4 % — SIGNIFICANT CHANGE UP (ref 39–50)
HGB BLD-MCNC: 13.6 G/DL — SIGNIFICANT CHANGE UP (ref 13–17)
IMM GRANULOCYTES NFR BLD AUTO: 0 % — SIGNIFICANT CHANGE UP (ref 0–0.9)
LIDOCAIN IGE QN: 48 U/L — SIGNIFICANT CHANGE UP (ref 13–75)
LYMPHOCYTES # BLD AUTO: 2.33 K/UL — SIGNIFICANT CHANGE UP (ref 1–3.3)
LYMPHOCYTES # BLD AUTO: 55.7 % — HIGH (ref 13–44)
MAGNESIUM SERPL-MCNC: 2.1 MG/DL — SIGNIFICANT CHANGE UP (ref 1.6–2.6)
MCHC RBC-ENTMCNC: 24.5 PG — LOW (ref 27–34)
MCHC RBC-ENTMCNC: 32.9 G/DL — SIGNIFICANT CHANGE UP (ref 32–36)
MCV RBC AUTO: 74.7 FL — LOW (ref 80–100)
MONOCYTES # BLD AUTO: 0.42 K/UL — SIGNIFICANT CHANGE UP (ref 0–0.9)
MONOCYTES NFR BLD AUTO: 10 % — SIGNIFICANT CHANGE UP (ref 2–14)
NEUTROPHILS # BLD AUTO: 1.26 K/UL — LOW (ref 1.8–7.4)
NEUTROPHILS NFR BLD AUTO: 30.3 % — LOW (ref 43–77)
NRBC BLD AUTO-RTO: 0 /100 WBCS — SIGNIFICANT CHANGE UP (ref 0–0)
PLATELET # BLD AUTO: 244 K/UL — SIGNIFICANT CHANGE UP (ref 150–400)
POTASSIUM SERPL-MCNC: 3.7 MMOL/L — SIGNIFICANT CHANGE UP (ref 3.5–5.3)
POTASSIUM SERPL-SCNC: 3.7 MMOL/L — SIGNIFICANT CHANGE UP (ref 3.5–5.3)
PROT SERPL-MCNC: 8.5 GM/DL — HIGH (ref 6–8.3)
RBC # BLD: 5.54 M/UL — SIGNIFICANT CHANGE UP (ref 4.2–5.8)
RBC # FLD: 30 % — HIGH (ref 10.3–14.5)
SODIUM SERPL-SCNC: 145 MMOL/L — SIGNIFICANT CHANGE UP (ref 135–145)
WBC # BLD: 4.18 K/UL — SIGNIFICANT CHANGE UP (ref 3.8–10.5)
WBC # FLD AUTO: 4.18 K/UL — SIGNIFICANT CHANGE UP (ref 3.8–10.5)

## 2025-07-09 PROCEDURE — 99222 1ST HOSP IP/OBS MODERATE 55: CPT

## 2025-07-09 PROCEDURE — 99285 EMERGENCY DEPT VISIT HI MDM: CPT

## 2025-07-09 PROCEDURE — 36000 PLACE NEEDLE IN VEIN: CPT

## 2025-07-09 PROCEDURE — 99223 1ST HOSP IP/OBS HIGH 75: CPT

## 2025-07-09 PROCEDURE — 76937 US GUIDE VASCULAR ACCESS: CPT | Mod: 26

## 2025-07-09 PROCEDURE — 93010 ELECTROCARDIOGRAM REPORT: CPT

## 2025-07-09 RX ORDER — LACTULOSE 10 G/15ML
20 SOLUTION ORAL
Refills: 0 | Status: DISCONTINUED | OUTPATIENT
Start: 2025-07-09 | End: 2025-07-14

## 2025-07-09 RX ORDER — MELATONIN 5 MG
3 TABLET ORAL AT BEDTIME
Refills: 0 | Status: DISCONTINUED | OUTPATIENT
Start: 2025-07-09 | End: 2025-07-14

## 2025-07-09 RX ORDER — ONDANSETRON HCL/PF 4 MG/2 ML
4 VIAL (ML) INJECTION EVERY 6 HOURS
Refills: 0 | Status: DISCONTINUED | OUTPATIENT
Start: 2025-07-09 | End: 2025-07-14

## 2025-07-09 RX ORDER — ONDANSETRON HCL/PF 4 MG/2 ML
4 VIAL (ML) INJECTION ONCE
Refills: 0 | Status: COMPLETED | OUTPATIENT
Start: 2025-07-09 | End: 2025-07-09

## 2025-07-09 RX ORDER — DIAZEPAM 5 MG/1
15 TABLET ORAL
Refills: 0 | Status: DISCONTINUED | OUTPATIENT
Start: 2025-07-09 | End: 2025-07-10

## 2025-07-09 RX ORDER — ACETAMINOPHEN 500 MG/5ML
650 LIQUID (ML) ORAL EVERY 6 HOURS
Refills: 0 | Status: DISCONTINUED | OUTPATIENT
Start: 2025-07-09 | End: 2025-07-14

## 2025-07-09 RX ORDER — DIAZEPAM 5 MG/1
5 TABLET ORAL ONCE
Refills: 0 | Status: DISCONTINUED | OUTPATIENT
Start: 2025-07-09 | End: 2025-07-09

## 2025-07-09 RX ORDER — FERROUS SULFATE 137(45) MG
325 TABLET, EXTENDED RELEASE ORAL DAILY
Refills: 0 | Status: DISCONTINUED | OUTPATIENT
Start: 2025-07-09 | End: 2025-07-10

## 2025-07-09 RX ORDER — B1/B2/B3/B5/B6/B12/VIT C/FOLIC 500-0.5 MG
1 TABLET ORAL DAILY
Refills: 0 | Status: DISCONTINUED | OUTPATIENT
Start: 2025-07-09 | End: 2025-07-14

## 2025-07-09 RX ORDER — PHENOBARBITAL 30 MG/1
130 TABLET ORAL ONCE
Refills: 0 | Status: DISCONTINUED | OUTPATIENT
Start: 2025-07-09 | End: 2025-07-09

## 2025-07-09 RX ORDER — SODIUM CHLORIDE 9 G/1000ML
1000 INJECTION, SOLUTION INTRAVENOUS
Refills: 0 | Status: DISCONTINUED | OUTPATIENT
Start: 2025-07-09 | End: 2025-07-10

## 2025-07-09 RX ORDER — CHLORDIAZEPOXIDE HCL 10 MG
75 CAPSULE ORAL EVERY 4 HOURS
Refills: 0 | Status: DISCONTINUED | OUTPATIENT
Start: 2025-07-09 | End: 2025-07-09

## 2025-07-09 RX ORDER — CHLORDIAZEPOXIDE HCL 10 MG
25 CAPSULE ORAL ONCE
Refills: 0 | Status: DISCONTINUED | OUTPATIENT
Start: 2025-07-09 | End: 2025-07-09

## 2025-07-09 RX ORDER — DIAZEPAM 5 MG/1
15 TABLET ORAL
Refills: 0 | Status: DISCONTINUED | OUTPATIENT
Start: 2025-07-09 | End: 2025-07-09

## 2025-07-09 RX ORDER — FOLIC ACID 1 MG/1
1 TABLET ORAL DAILY
Refills: 0 | Status: DISCONTINUED | OUTPATIENT
Start: 2025-07-10 | End: 2025-07-14

## 2025-07-09 RX ORDER — DIAZEPAM 5 MG/1
10 TABLET ORAL
Refills: 0 | Status: DISCONTINUED | OUTPATIENT
Start: 2025-07-09 | End: 2025-07-10

## 2025-07-09 RX ORDER — FOLIC ACID 1 MG/1
1 TABLET ORAL ONCE
Refills: 0 | Status: COMPLETED | OUTPATIENT
Start: 2025-07-09 | End: 2025-07-09

## 2025-07-09 RX ORDER — PHENOBARBITAL 30 MG/1
130 TABLET ORAL ONCE
Refills: 0 | Status: DISCONTINUED | OUTPATIENT
Start: 2025-07-09 | End: 2025-07-10

## 2025-07-09 RX ORDER — MAGNESIUM, ALUMINUM HYDROXIDE 200-200 MG
30 TABLET,CHEWABLE ORAL EVERY 4 HOURS
Refills: 0 | Status: DISCONTINUED | OUTPATIENT
Start: 2025-07-09 | End: 2025-07-10

## 2025-07-09 RX ADMIN — Medication 100 MILLIGRAM(S): at 11:15

## 2025-07-09 RX ADMIN — Medication 40 MILLIGRAM(S): at 11:15

## 2025-07-09 RX ADMIN — Medication 4 MILLIGRAM(S): at 09:07

## 2025-07-09 RX ADMIN — Medication 650 MILLIGRAM(S): at 11:45

## 2025-07-09 RX ADMIN — FOLIC ACID 1 MILLIGRAM(S): 1 TABLET ORAL at 09:07

## 2025-07-09 RX ADMIN — Medication 2 MILLIGRAM(S): at 12:28

## 2025-07-09 RX ADMIN — DIAZEPAM 15 MILLIGRAM(S): 5 TABLET ORAL at 19:59

## 2025-07-09 RX ADMIN — Medication 1000 MILLILITER(S): at 09:07

## 2025-07-09 RX ADMIN — Medication 30 MILLILITER(S): at 11:15

## 2025-07-09 RX ADMIN — Medication 100 MILLIGRAM(S): at 09:07

## 2025-07-09 RX ADMIN — DIAZEPAM 15 MILLIGRAM(S): 5 TABLET ORAL at 14:43

## 2025-07-09 RX ADMIN — Medication 2 MILLIGRAM(S): at 18:57

## 2025-07-09 RX ADMIN — Medication 4 MILLIGRAM(S): at 09:56

## 2025-07-09 RX ADMIN — DIAZEPAM 5 MILLIGRAM(S): 5 TABLET ORAL at 09:20

## 2025-07-09 RX ADMIN — DIAZEPAM 10 MILLIGRAM(S): 5 TABLET ORAL at 17:04

## 2025-07-09 RX ADMIN — Medication 650 MILLIGRAM(S): at 11:15

## 2025-07-09 RX ADMIN — Medication 25 MILLIGRAM(S): at 09:07

## 2025-07-09 RX ADMIN — Medication 2 MILLIGRAM(S): at 12:58

## 2025-07-09 RX ADMIN — DIAZEPAM 10 MILLIGRAM(S): 5 TABLET ORAL at 22:32

## 2025-07-09 RX ADMIN — Medication 75 MILLIGRAM(S): at 11:16

## 2025-07-09 RX ADMIN — PHENOBARBITAL 130 MILLIGRAM(S): 30 TABLET ORAL at 21:15

## 2025-07-09 RX ADMIN — Medication 2 MILLIGRAM(S): at 19:27

## 2025-07-09 RX ADMIN — Medication 325 MILLIGRAM(S): at 11:16

## 2025-07-09 RX ADMIN — Medication 1 TABLET(S): at 11:16

## 2025-07-09 RX ADMIN — Medication 4 MILLIGRAM(S): at 10:26

## 2025-07-09 RX ADMIN — Medication 20 MILLIGRAM(S): at 09:07

## 2025-07-09 NOTE — BH CONSULTATION LIAISON ASSESSMENT NOTE - NSBHCONSULTFOLLOWAFTERCARE_PSY_A_CORE FT
as per primary team once medically cleared and mental status returns to baseline  as per primary team; DC home once medically cleared

## 2025-07-09 NOTE — H&P ADULT - PROBLEM SELECTOR PLAN 1
elevated CIWA score in ED, improved with benzo  serum alcohol 237  recurrent hospitalization for alcohol intoxication and alcohol withdrawal complicated by DT  EKG  ( I personally reviewed the images) NSR, LVH  Complex care patient. Symptoms trigger benzo for now. Monitor for sedation. If inadequate, will add benzo taper  Monitor for DT, high risk patient  thiamine, folic acid, multivitamin  IV fluid

## 2025-07-09 NOTE — BH CONSULTATION LIAISON ASSESSMENT NOTE - NSBHHPIREASONCL_PSY_A_CORE
med management/alcohol Suturegard Intro: Intraoperative tissue expansion was performed, utilizing the SUTUREGARD device, in order to reduce wound tension.

## 2025-07-09 NOTE — BH CONSULTATION LIAISON ASSESSMENT NOTE - HPI (INCLUDE ILLNESS QUALITY, SEVERITY, DURATION, TIMING, CONTEXT, MODIFYING FACTORS, ASSOCIATED SIGNS AND SYMPTOMS)
PATIENT KNOWN TO WRITER FROM PRIOR ADMISSIONS; COMPLEX CARE CASE: 61 yo [place] male, lives with his son in a private home, works as a , with long history of continuous Alcohol Dependence/Severe Alcohol Use Disorder spanning decades (at most reports 2-3 bottles of vodka 1-2/day; has cute down on amount and frequency in last few years), with associated numerous ED visits and hospital admissions (ie. epigastric pain, vomiting, nausea, hematemesis, aspiration pneumonia, esophageal ulcer, UGI bleed, Jacque Henning tears, chronic anemia, pancytopenia, MSSA aspiration PNA, ICU admissions including [facility] Fall 24' prolonged hospitalization for hypoxic respiratory failure requiring intubation due to cerebral edema and AMS (s/p Plex x3, mannitol and hypertonic saline), has had BALs > 400 before.  Patient seen by Hepatology at [facility] 10/24 for decompensated alcohol cirrhosis, MELD 26 with ascites: s/p paracentesis 10/1 with 800cc fluid removal, SAAG > 1.1, most recent MELD 22/8. Patient with hx of myriad of ED presentations usually for GI complaints and usually in state of alcohol intoxication, gets frequently medically admitted who returns 7/9/25 and triaged as " biba from home with c/o vomiting, and body pain x today."  . On symptom triggered CIWA.     EXAM:     ISTOP Reference #: [id number] no record of Patient  PATIENT KNOWN TO WRITER FROM PRIOR ADMISSIONS; COMPLEX CARE CASE: 61 yo [place]  [place] male, lives with his son in a private home, works as a , with long history of continuous Alcohol Dependence/Severe Alcohol Use Disorder spanning decades (at most reports 2-3 bottles of vodka 1-2/day; has cute down on amount and frequency in last few years), with associated numerous ED visits and hospital admissions (ie. epigastric pain, vomiting, nausea, hematemesis, aspiration pneumonia, esophageal ulcer, UGI bleed, Jacque Henning tears, chronic anemia, pancytopenia, MSSA aspiration PNA, ICU admissions including [facility] Fall 24' prolonged hospitalization for hypoxic respiratory failure requiring intubation due to cerebral edema and AMS (s/p Plex x3, mannitol and hypertonic saline), has had BALs > 400 before.  Patient seen by Hepatology at [facility] 10/24 for decompensated alcohol cirrhosis, MELD 26 with ascites: s/p paracentesis 10/1 with 800cc fluid removal, SAAG > 1.1, most recent MELD 22/8. Patient with hx of myriad of ED presentations usually for GI complaints and usually in state of alcohol intoxication, gets frequently medically admitted who returns 7/9/25 and triaged as " biba from home with c/o vomiting, and body pain x today."  . On symptom triggered CIWA.     EXAM: awake, alert, oriented, reports he did drank but only a little and came because he has diffuse abdominal pain mostly in gastric area pain worst with vomiting, + rust colored vomitus at home, recent constipation with report of dark stools. + some small vomitus amounts in bin at bedside. On inspection and self report, mild/moderate withdrawal symptoms at this time. No diaphoresis or overt tremulousness.      ISTOP Reference #: [id number] no record of Patient

## 2025-07-09 NOTE — BH CONSULTATION LIAISON ASSESSMENT NOTE - NSBHCHARTREVIEWLAB_PSY_A_CORE FT
07-09    145  |  105  |  13  ----------------------------<  113[H]  3.7   |  23  |  1.22    Ca    9.6      09 Jul 2025 08:47  Mg     2.1     07-09    TPro  8.5[H]  /  Alb  3.9  /  TBili  0.8  /  DBili  x   /  AST  64[H]  /  ALT  42  /  AlkPhos  74  07-09

## 2025-07-09 NOTE — PROVIDER CONTACT NOTE (OTHER) - SITUATION
Patient with severe tremors, vomitting, severe headache and abd pain. Upon reassessment CIWA score 22. RRT called.

## 2025-07-09 NOTE — H&P ADULT - NSHPPHYSICALEXAM_GEN_ALL_CORE
CONSTITUTIONAL: alert and cooperative, no acute distress.   EYES: PERRL, no scleral icterus  ENT: Mucosa moist, tongue normal  NECK: Neck supple, trachea midline, non-tender  CARDIAC: Normal S1 and S2. Regular rate and rhythms. No Pedal edema  LUNGS: Equal air entry both lungs. No rales, rhonchi, wheezing. Normal respiratory effort.   ABDOMEN: Epigastric tenderness+. No guarding or rebound tenderness. No hepatomegaly or splenomegaly. Bowel sound normal.  MUSCULOSKELETAL: Normocephalic, atraumatic. No significant deformity or joint abnormality  NEUROLOGICAL: No gross motor or sensory deficits.  PSYCHIATRIC: A&O x 3, mildly tremulous

## 2025-07-09 NOTE — ED ADULT NURSE NOTE - NSFALLHARMRISKINTERV_ED_ALL_ED
Assistance OOB with selected safe patient handling equipment if applicable/Assistance with ambulation/Communicate risk of Fall with Harm to all staff, patient, and family/Monitor gait and stability/Monitor for mental status changes and reorient to person, place, and time, as needed/Provide visual cue: red socks, yellow wristband, yellow gown, etc/Reinforce activity limits and safety measures with patient and family/Toileting schedule using arm’s reach rule for commode and bathroom/Use of alarms - bed, stretcher, chair and/or video monitoring/Bed in lowest position, wheels locked, appropriate side rails in place/Call bell, personal items and telephone in reach/Instruct patient to call for assistance before getting out of bed/chair/stretcher/Non-slip footwear applied when patient is off stretcher/[employer] to call system/Physically safe environment - no spills, clutter or unnecessary equipment/Purposeful Proactive Rounding/Room/bathroom lighting operational, light cord in reach

## 2025-07-09 NOTE — H&P ADULT - PROBLEM SELECTOR PLAN 2
due to nausea, vomiting  IV pantoprazole 40mg daily  IV fluid  Clear liquid and advance as tolerate   pain control--> IV morphine prn for severe pain. Monitor for sedation

## 2025-07-09 NOTE — H&P ADULT - PROBLEM SELECTOR PROBLEM 3
Liver cirrhosis, alcoholic
patient resting comfortably, son at bedside, results discussed, uti,  rx for ceftin sent to pharmacy, follow up with pmd, call monday to arrange follow up , repeat fs 225.  copy of results given, advised if any concerns return to ed

## 2025-07-09 NOTE — ED PROVIDER NOTE - CONSTITUTIONAL, MLM
normal... Well appearing, awake, alert, oriented to person, place, time/situation and in no apparent distress. Speaking in clear full sentences no active vomiting no nasal flaring no shoulders retractions no diaphoresis

## 2025-07-09 NOTE — BH CONSULTATION LIAISON ASSESSMENT NOTE - CURRENT MEDICATION
MEDICATIONS  (STANDING):  ferrous    sulfate 325 milliGRAM(s) Oral daily  folic acid 1 milliGRAM(s) Oral daily  lactated ringers. 1000 milliLiter(s) (125 mL/Hr) IV Continuous <Continuous>  lactulose Syrup 20 Gram(s) Oral two times a day  multivitamin 1 Tablet(s) Oral daily  pantoprazole  Injectable 40 milliGRAM(s) IV Push daily  rifAXIMin 550 milliGRAM(s) Oral two times a day  thiamine 100 milliGRAM(s) Oral daily    MEDICATIONS  (PRN):  acetaminophen     Tablet .. 650 milliGRAM(s) Oral every 6 hours PRN Mild Pain (1 - 3), Moderate Pain (4 - 6)  aluminum hydroxide/magnesium hydroxide/simethicone Suspension 30 milliLiter(s) Oral every 4 hours PRN Dyspepsia  chlordiazePOXIDE 75 milliGRAM(s) Oral every 4 hours PRN CIWA score 9-12  diazepam  Injectable 15 milliGRAM(s) IV Push every 2 hours PRN CIWA > 13  melatonin 3 milliGRAM(s) Oral at bedtime PRN Insomnia  morphine  - Injectable 2 milliGRAM(s) IV Push every 6 hours PRN Severe Pain (7 - 10)  ondansetron Injectable 4 milliGRAM(s) IV Push every 6 hours PRN Nausea and/or Vomiting

## 2025-07-09 NOTE — BH CONSULTATION LIAISON ASSESSMENT NOTE - SUMMARY
Patient well known to  services - 59yo M with decades of heavy alcohol use disorder with physiological dependence, hx of withdrawals needing supervised medical withdrawal management, with lifelong low motivation to achieve sobriety/abstinence. Patient subsequently developed serious chronic medical sequela, including most recent diagnosis of liver failure, resulting in frequent ED presentations and medical admissions. Recently has been attending outpatient follow up with PCP Dr Archana Magallanes who saw Patient June 2025 as per HIE. Currently adequately controlled with CIWA/symptom-triggered meds.   Patient well known to  services - 61yo M with decades of heavy alcohol use disorder with physiological dependence, hx of withdrawals needing supervised medical withdrawal management, with lifelong low motivation to achieve sobriety/abstinence. Patient subsequently developed serious chronic medical sequela, including most recent diagnosis of liver failure, resulting in frequent ED presentations and medical admissions. Recently has been attending outpatient follow up with PCP Dr Archana Magallanes who saw Patient June 2025 as per HIE. Currently adequately controlled with CIWA/symptom-triggered meds and c/o of rust colored vomitus at home and dark stools with recent constipation.

## 2025-07-09 NOTE — BH CONSULTATION LIAISON ASSESSMENT NOTE - RISK ASSESSMENT
Chronic risk factors: single, male gender, continuous/chronic heavy alcohol abuse with limited insight and lack of motivation to address it. Protective factors: young; no formal psych hx; no known hx of suicide attempts or self-injurious behavior; no hx of aggression/violence; stable domicile, engaged with his job; denies illicit drug use; denies access to guns; access to health services. No acute risk factors identified - baseline

## 2025-07-09 NOTE — ED PROVIDER NOTE - PROGRESS NOTE DETAILS
repeat ciwa is 10 decreased from 18 Pt has normal anion gap Dr. Cazares is notified and admitted pt Pt appears comfortable no distress.

## 2025-07-09 NOTE — BH CONSULTATION LIAISON ASSESSMENT NOTE - RECORDS REVIEWED
Abdomen , soft, nontender, nondistended , no guarding or rigidity , no masses palpable , normal bowel sounds , Liver and Spleen,  no hepatosplenomegaly , liver nontender
Hospital chart (includes HIE)

## 2025-07-09 NOTE — H&P ADULT - NSHPREVIEWOFSYSTEMS_GEN_ALL_CORE
All ROS were negative except nausea, vomiting, upper abdominal pain, alcohol withdrawal <-------- Click here to INCLUDE CoVID-19 Discharge Instructions

## 2025-07-09 NOTE — BH CONSULTATION LIAISON ASSESSMENT NOTE - NSSUICPROTFACT_PSY_ALL_CORE
Responsibility to children, family, or others/Identifies reasons for living/Supportive social network of family or friends/Engaged in work or school/[employer]ism beliefs

## 2025-07-09 NOTE — BH CONSULTATION LIAISON ASSESSMENT NOTE - HOMICIDALITY / AGGRESSION (CURRENT/PAST)
Subjective   History of Present Illness  Chief complaint tightness in chest heart racing    History of present illness a 65-year-old gentleman has history of atrial fibrillation states he was walking in the house this morning he developed some tightness in his chest his heart was racing and felt lightheaded.  He took his heart rate it was in the 140s.  The patient was brought in by EMS.  He denies any current pain he states it was mild there was no neck arm jaw pain there was no shortness of breath or sweatiness with that he was lightheaded and a little bit dizzy.  The patient denies any vomiting diarrhea no black or bloody stool no recent injury illness flus viruses vaccinations no recent long car ride plane ride immobilization foreign travels.  Patient did have some medication changes recently he has a history of atrial fibrillation and recently had digoxin added to his regimen.      Review of Systems   Constitutional:  Negative for chills and fever.   Respiratory:  Positive for chest tightness. Negative for shortness of breath.    Cardiovascular:  Positive for chest pain and palpitations. Negative for leg swelling.   Gastrointestinal:  Negative for abdominal pain and vomiting.   Genitourinary:  Negative for difficulty urinating and dysuria.   Musculoskeletal:  Negative for back pain and neck pain.   Neurological:  Negative for dizziness and light-headedness.   Psychiatric/Behavioral:  Negative for confusion.        Past Medical History:   Diagnosis Date    Acute hypoxemic respiratory failure 11/06/2023    Allergic     Anxiety     Arthritis 06/2005    Back pain     Claustrophobia 1978    COPD (chronic obstructive pulmonary disease)     CTS (carpal tunnel syndrome) 10/2022    Dysphagia     Esophageal stricture     GERD (gastroesophageal reflux disease)     Hyperlipidemia     Hypertension     Knee pain     rt    Low back pain     Obesity     Obesity (BMI 30-39.9) 11/06/2023    Other cervical disc degeneration at  C5-C6 level 08/22/2018    Paroxysmal atrial fibrillation with rapid ventricular response 11/06/2023    Pneumonia     Prediabetes 11/06/2023    PTSD (post-traumatic stress disorder)     Rhinovirus infection 11/06/2023    Thoracic disc herniation     Vitamin B12 deficiency        Allergies   Allergen Reactions    Atorvastatin Swelling    Lisinopril Other (See Comments)     Facial paralysis        Past Surgical History:   Procedure Laterality Date    BRONCHOSCOPY N/A 11/3/2023    Procedure: BRONCHOSCOPY with bilateral lung washing's;  Surgeon: Kolton Brewer MD;  Location: [facility] ENDOSCOPY;  Service: Pulmonary;  Laterality: N/A;    CARDIAC CATHETERIZATION N/A 07/13/2022    Procedure: Left Heart Cath, possible pci;  Surgeon: Prieto Sidhu MD;  Location: [facility] CATH INVASIVE LOCATION;  Service: Cardiovascular;  Laterality: N/A;    ENDOSCOPY      ENDOSCOPY N/A 9/21/2023    Procedure: ESOPHAGOGASTRODUODENOSCOPY WITH non guided esophageal dialtion 54 Fr. Bougie and  cold forcep biopsy x1 area;  Surgeon: Andres Concepcion MD;  Location: [facility] ENDOSCOPY;  Service: Gastroenterology;  Laterality: N/A;  Post- erosive gastritis, hiatal hernia, esophageal stricture    HERNIA REPAIR      KNEE ARTHROPLASTY      x2    SHOULDER ARTHROSCOPY W/ ROTATOR CUFF REPAIR Right 08/11/2020    Procedure: SHOULDER ARTHROSCOPY WITH ROTATOR CUFF REPAIR, extensive debridement;  Surgeon: Fredi Ritchie MD;  Location: [facility] MAIN OR;  Service: Orthopedics;  Laterality: Right;    TONSILLECTOMY         Family History   Problem Relation Age of Onset    Heart disease Mother     Early death Mother     Hyperlipidemia Mother     Hypertension Mother     Thyroid disease Mother     Cancer Father     Stroke Father     Vision loss Father     Stroke Paternal Grandfather     Arthritis Paternal Grandmother     Alcohol abuse Brother     Asthma Brother     Depression Brother     Hyperlipidemia Brother     Hypertension Brother      Learning disabilities Brother     Mental illness Brother     Drug abuse Brother     Early death Brother     Heart disease Brother     Hyperlipidemia Brother     Hypertension Brother        Social History     Socioeconomic History    Marital status:    Tobacco Use    Smoking status: Every Day     Packs/day: 1.00     Years: 47.00     Additional pack years: 0.00     Total pack years: 47.00     Types: Cigarettes     Start date: 1/9/1973    Smokeless tobacco: Never    Tobacco comments:     Smoked a half a pack a day for 42 years didn't start to smoke a whole pack until 2017   Vaping Use    Vaping Use: Never used   Substance and Sexual Activity    Alcohol use: No    Drug use: No    Sexual activity: Defer     Prior to Admission medications    Medication Sig Start Date End Date Taking? Authorizing Provider   albuterol sulfate  (90 Base) MCG/ACT inhaler Inhale 2 puffs Every 4 (Four) Hours As Needed for Wheezing. 4/4/22   Yamilka Pascual APRN   amLODIPine (NORVASC) 10 MG tablet TAKE 1 TABLET EVERY DAY 9/4/23   Yamilka Pascual APRN   apixaban (ELIQUIS) 5 MG tablet tablet Take 1 tablet by mouth Every 12 (Twelve) Hours. Indications: Atrial Fibrillation 11/18/23   Morris Rios MD   doxepin (SINEquan) 10 MG capsule TAKE 1 CAPSULE EVERY NIGHT. 6/21/23   Yamilka Pascual APRN   hydroCHLOROthiazide (MICROZIDE) 12.5 MG capsule Take 1 capsule by mouth As Needed.    ProviderSulaiman MD   ipratropium-albuterol (DUO-NEB) 0.5-2.5 mg/3 ml nebulizer Take 3 mL by nebulization Every 4 (Four) Hours As Needed for Wheezing.    Provider, MD Sulaiman   metoprolol succinate XL (TOPROL-XL) 50 MG 24 hr tablet Take 1 tablet by mouth Daily. 11/19/23   Morris Rios MD   pantoprazole (PROTONIX) 40 MG EC tablet TAKE 1 TABLET EVERY DAY 9/29/23   Yamilka Pascual APRN   pravastatin (PRAVACHOL) 80 MG tablet TAKE 1 TABLET EVERY NIGHT 11/17/23   Yamilka Pascual APRN          Objective   Physical Exam  Constitutional  65-year-old gentleman awake alert no acute distress triage vital signs reviewed patient is on the monitor he will be in a sinus rhythm today he will go on a brief spell of A-fib in the 120s to 140s for probably about 10 or 15 seconds and back to the sinus rhythm.  HEENT extraocular muscles are intact pupils equal round react sclera clear neck supple no adenopathy no JV no bruits lungs clear no retraction heart currently regular without murmur abdomen soft nontender good bowel sounds no peritoneal findings or pulsatile masses extremities pulses equal upper and lower extremities no edema cords or Homans' sign or evidence of DVT skin warm and dry without rashes or cellulitic change neurologic awake alert and follows commands motor strength normal without focal weakness  Procedures           ED Course      Results for orders placed or performed during the hospital encounter of 01/20/24   Comprehensive Metabolic Panel    Specimen: Blood   Result Value Ref Range    Glucose 112 (H) 65 - 99 mg/dL    BUN 9 8 - 23 mg/dL    Creatinine 0.84 0.76 - 1.27 mg/dL    Sodium 143 136 - 145 mmol/L    Potassium 3.7 3.5 - 5.2 mmol/L    Chloride 107 98 - 107 mmol/L    CO2 24.0 22.0 - 29.0 mmol/L    Calcium 8.9 8.6 - 10.5 mg/dL    Total Protein 7.3 6.0 - 8.5 g/dL    Albumin 4.3 3.5 - 5.2 g/dL    ALT (SGPT) 22 1 - 41 U/L    AST (SGOT) 22 1 - 40 U/L    Alkaline Phosphatase 72 39 - 117 U/L    Total Bilirubin 0.5 0.0 - 1.2 mg/dL    Globulin 3.0 gm/dL    A/G Ratio 1.4 g/dL    BUN/Creatinine Ratio 10.7 7.0 - 25.0    Anion Gap 12.0 5.0 - 15.0 mmol/L    eGFR 98.0 >60.0 mL/min/1.73   Single High Sensitivity Troponin T    Specimen: Blood   Result Value Ref Range    HS Troponin T 8 <22 ng/L   Digoxin Level    Specimen: Blood   Result Value Ref Range    Digoxin 1.50 (H) 0.60 - 1.20 ng/mL   Magnesium    Specimen: Blood   Result Value Ref Range    Magnesium 2.0 1.6 - 2.4 mg/dL   TSH    Specimen: Blood   Result Value Ref Range    TSH 2.730 0.270 - 4.200  uIU/mL   T4, Free    Specimen: Blood   Result Value Ref Range    Free T4 0.77 (L) 0.93 - 1.70 ng/dL   CBC Auto Differential    Specimen: Blood   Result Value Ref Range    WBC 7.20 3.40 - 10.80 10*3/mm3    RBC 4.86 4.14 - 5.80 10*6/mm3    Hemoglobin 16.5 13.0 - 17.7 g/dL    Hematocrit 48.9 37.5 - 51.0 %    .5 (H) 79.0 - 97.0 fL    MCH 33.9 (H) 26.6 - 33.0 pg    MCHC 33.7 31.5 - 35.7 g/dL    RDW 14.2 12.3 - 15.4 %    RDW-SD 49.0 37.0 - 54.0 fl    MPV 9.7 6.0 - 12.0 fL    Platelets 157 140 - 450 10*3/mm3    Neutrophil % 50.6 42.7 - 76.0 %    Lymphocyte % 39.4 19.6 - 45.3 %    Monocyte % 6.2 5.0 - 12.0 %    Eosinophil % 3.1 0.3 - 6.2 %    Basophil % 0.7 0.0 - 1.5 %    Neutrophils, Absolute 3.70 1.70 - 7.00 10*3/mm3    Lymphocytes, Absolute 2.80 0.70 - 3.10 10*3/mm3    Monocytes, Absolute 0.50 0.10 - 0.90 10*3/mm3    Eosinophils, Absolute 0.20 0.00 - 0.40 10*3/mm3    Basophils, Absolute 0.10 0.00 - 0.20 10*3/mm3    nRBC 0.1 0.0 - 0.2 /100 WBC   High Sensitivity Troponin T    Specimen: Arm, Left; Blood   Result Value Ref Range    HS Troponin T 7 <22 ng/L   ECG 12 Lead Rhythm Change   Result Value Ref Range    QT Interval 348 ms    QTC Interval 405 ms     XR Chest 1 View    Result Date: 1/20/2024  No radiographic findings of acute cardiopulmonary abnormality. Electronically Signed: Andres Alatorre  1/20/2024 9:40 AM EST  Workstation ID: [id number]   Medications   sodium chloride 0.9 % flush 10 mL (10 mL Intravenous Given 1/20/24 0951)   sodium chloride 0.9 % flush 10 mL (10 mL Intravenous Given 1/20/24 1227)   sodium chloride 0.9 % flush 10 mL (has no administration in time range)   sodium chloride 0.9 % infusion 40 mL (has no administration in time range)   sennosides-docusate (PERICOLACE) 8.6-50 MG per tablet 2 tablet (2 tablets Oral Not Given 1/20/24 3014)     And   polyethylene glycol (MIRALAX) packet 17 g (has no administration in time range)     And   bisacodyl (DULCOLAX) EC tablet 5 mg (has no administration in  time range)     And   bisacodyl (DULCOLAX) suppository 10 mg (has no administration in time range)   nitroglycerin (NITROSTAT) SL tablet 0.4 mg (has no administration in time range)   acetaminophen (TYLENOL) tablet 650 mg (has no administration in time range)     Or   acetaminophen (TYLENOL) 160 MG/5ML oral solution 650 mg (has no administration in time range)     Or   acetaminophen (TYLENOL) suppository 650 mg (has no administration in time range)   ondansetron (ZOFRAN) injection 4 mg (has no administration in time range)   aluminum-magnesium hydroxide-simethicone (MAALOX MAX) 400-400-40 MG/5ML suspension 15 mL (has no administration in time range)   amLODIPine (NORVASC) tablet 10 mg (10 mg Oral Given 1/20/24 1224)   apixaban (ELIQUIS) tablet 5 mg ( Oral Unheld by provider 1/20/24 1255)   digoxin (LANOXIN) tablet 250 mcg ( Oral Dose Auto Held 1/28/24 1200)   doxepin (SINEquan) capsule 10 mg (has no administration in time range)   ipratropium-albuterol (DUO-NEB) nebulizer solution 3 mL (has no administration in time range)   metoprolol succinate XL (TOPROL-XL) 24 hr tablet 50 mg (50 mg Oral Not Given 1/20/24 1224)   pantoprazole (PROTONIX) EC tablet 40 mg (40 mg Oral Given 1/20/24 1224)                                  EKG my interpretation normal sinus rhythm rate 91 normal axis hypertrophy QTc of 405 normal EKG but transition from an atrial fibrillation with really unchanged from previous EKG on 11/18/2023  EKG #2 my interpretation atrial fibrillation rate of 120 normal axis hypertrophy QTc of 4078 transition is not into a normal sinus rhythm and is a change from the EKG from earlier today abnormal            Medical Decision Making  Medical decision making.  IV established monitor placed my independent review initially sinus rhythm intermittent bouts of atrial fibrillation.  Patient had EKG obtained independent review shows a normal sinus rhythm rate of 91 normal axis no hypertrophy QTc of 457 normal EKG and  unchanged from 11/18/2023.  Patient had labs obtained independent reviewed by me comprehensive metabolic profile including potassium within normal limits troponin negative digoxin level was 1.5 magnesium 2 TSH normal at this point CBC unremarkable MCV of 100.  The patient's second troponin was negative.  Chest x-ray my independent review no pneumonia pneumothorax or acute findings.  Radiology unremarkable.  Patient had intermittent burst of A-fib with RVR up into the 130s to 140s that would last about 10 seconds and resolved.  He had this a couple times but after last time he has been okay for about 30 minutes without further episodes.  He has no pain.  He had some mild tightness which that was mild he states it has been gone even before he got to the hospital.  I do not see evidence of acute myocardial infarction DVT pulmonary embolism and dissection congestive heart failure pericardial effusion pneumonia sepsis.  Not complete list of all possibilities.  Patient is already anticoagulated with Eliquis.  We talked about the findings.  He had a heart catheterization done a little over a year ago on 7/13/2022 which was unremarkable.  Patient be placed in the observation provider notified serial troponins cardiac consultation stable unremarkable ER course    Problems Addressed:  Chest tightness: complicated acute illness or injury  Paroxysmal atrial fibrillation: complicated acute illness or injury    Amount and/or Complexity of Data Reviewed  External Data Reviewed: ECG.  Labs: ordered. Decision-making details documented in ED Course.  Radiology: ordered and independent interpretation performed. Decision-making details documented in ED Course.  ECG/medicine tests: ordered and independent interpretation performed. Decision-making details documented in ED Course.    Risk  Decision regarding hospitalization.        Final diagnoses:   Chest tightness   Paroxysmal atrial fibrillation       ED Disposition  ED Disposition        ED Disposition   Decision to Admit    Condition   --    Comment   --               No follow-up provider specified.       Medication List        ASK your doctor about these medications      amLODIPine 10 MG tablet  Commonly known as: NORVASC  Ask about: Which instructions should I use?     doxepin 10 MG capsule  Commonly known as: SINEquan  Ask about: Which instructions should I use?     ipratropium-albuterol 0.5-2.5 mg/3 ml nebulizer  Commonly known as: DUO-NEB  Ask about: Which instructions should I use?     pantoprazole 40 MG EC tablet  Commonly known as: PROTONIX  Ask about: Which instructions should I use?     pravastatin 80 MG tablet  Commonly known as: PRAVACHOL  Ask about: Which instructions should I use?                 Neymar Musa MD  01/20/24 5168     None known in lifetime

## 2025-07-09 NOTE — H&P ADULT - ASSESSMENT
60 years old male with h/o alcohol abuse, liver cirrhosis present to ED with vomiting, epigastric pain and alcohol withdrawal. Patient reported nausea and vomiting for a few days, associated with upper abdominal pain. Patient has recurrent hospitalization for alcohol intoxication and alcohol withdrawal complicated by DT. Patient is not honest with alcohol intake. Initially stated that last drink was a few days ago but later stated that he had some drink last night to help him sleep after telling him labs with elevated serum alcohol level.   Hemodynamically stable, afebrile. No leukocytosis, plt 244, Cr 1.22, serum alcohol 237. EKG with NSR, LVH

## 2025-07-09 NOTE — BH CONSULTATION LIAISON ASSESSMENT NOTE - NSBHCHARTREVIEWVS_PSY_A_CORE FT
Vital Signs Last 24 Hrs  T(C): 36.4 (09 Jul 2025 14:06), Max: 36.7 (09 Jul 2025 11:08)  T(F): 97.6 (09 Jul 2025 14:06), Max: 98.1 (09 Jul 2025 11:08)  HR: 68 (09 Jul 2025 14:06) (58 - 102)  BP: 164/96 (09 Jul 2025 14:06) (142/82 - 164/96)  BP(mean): --  RR: 14 (09 Jul 2025 14:06) (14 - 18)  SpO2: 98% (09 Jul 2025 14:06) (97% - 100%)    Parameters below as of 09 Jul 2025 14:06  Patient On (Oxygen Delivery Method): nasal cannula  O2 Flow (L/min): 2

## 2025-07-09 NOTE — BH CONSULTATION LIAISON ASSESSMENT NOTE - NSBHCONSULTRECOMMENDOTHER_PSY_A_CORE FT
- continue symptom triggered CIWA  - PATIENT DID NOT PRESENT WITH ALCOHOL WITHDRAWAL BUT WITH ACUTE INTOXICATION WITH BAL .   - Complex Care recommendations for admission not clearly followed   - continue to offer Patient addiction services; would try to involved Patient's wife to encourage Patient to consider addiction treatment as she usually comes and picks him up      - continue symptom triggered CIWA; given nausea/vomiting, PO Librium will be changed out to IVP benzo   - PATIENT DID NOT PRESENT WITH ALCOHOL WITHDRAWAL BUT WITH ACUTE INTOXICATION WITH BAL   - hx of esophageal varices...reports rust colored vomitus and dark stools. + NPO, IV protonix, GI consult recommended.   - continue to offer Patient addiction services; would try to involved Patient's wife to encourage Patient to consider addiction treatment as she usually comes and picks him up

## 2025-07-09 NOTE — SBIRT NOTE ADULT - NSSBIRTUNABLESCR_GEN_A_CORE
Patient not full screened because: Patient prefers to speak to Remote Team at a later time.   Reports binge drinking 2-3 times per week.    Patient's substance use will be addressed during their inpatient admission./Patient refused

## 2025-07-09 NOTE — CHART NOTE - NSCHARTNOTEFT_GEN_A_CORE
MPA Note    Rapid response called for pt with CIWA > 14 despite 15 mg of IV Valium.    Pt is 60 year old male PMHx significant for alcohol abuse, liver cirrhosis admitted today for vomiting, epigastric pain and alcohol withdrawal. Hx of recurrent hospitalization for alcohol intox and withdrawal complicated by DT. Reports last drink a few days ago. At RRT, pt CIWA 15. Pt shaking, sweating.     PE:  General: awake, alert, anxious  Lungs: b/l chest rise  Heart: S1, S2, RRR  Neuro: upper and lower extremity tremors    Assessment:  Alcohol withdrawal    Plan:  Nocturnist Dr. Fitzgerald at RRT  s/p 15 mg IV Valium  Phenobarb 130 x 1 ordered  Will reassess CIWA in 1 hour MPA Note    Rapid response called for pt with CIWA of 22 despite 15 mg of IV Valium.    Pt is 60 year old male PMHx significant for alcohol abuse, liver cirrhosis admitted today for vomiting, epigastric pain and alcohol withdrawal. Hx of recurrent hospitalization for alcohol intox and withdrawal complicated by DT. Reports last drink a few days ago. At RRT, pt CIWA 22. Pt shaking, sweating. Pt speaking Leah, aided by .     Vitals:  BP: 161/94, HR 68, RR 18, Temp 98.2      PE:  General: awake, alert, anxious  Lungs: b/l chest rise  Heart: S1, S2, RRR  Neuro: upper and lower extremity tremors  Skin: moist, warm    Assessment:  Alcohol withdrawal    Plan:  Nocturnist Dr. Fitzgerald at RRT  s/p 15 mg IV Valium  Phenobarb 130 x 1 ordered  Will reassess CIWA in 1 hour

## 2025-07-09 NOTE — ED PROVIDER NOTE - CLINICAL SUMMARY MEDICAL DECISION MAKING FREE TEXT BOX
60 years old male by ems from home c/o vomiting pt has a hx of alcohol abuse and last drink was two days ago. Pt has CIWA score os 18 Pt's abd soft nontender non distended labs. thiamine 100 mg folic acid 1 mg po ivf valium 5 mg iv and cardiac monitor/pulse ox are ordered. Pt denies trauma to the head, headache, dizziness, blurred visions, light sensitivities, focal/distal weakness or numbness, neck/back/hips/calfs pain., coughs, sob, chest pain, fever, chills, abd pain, dysuria, or abnormal stool color.

## 2025-07-09 NOTE — CHART NOTE - NSCHARTNOTEFT_GEN_A_CORE
Responded to RRT called at 9:04PM for elevated CIWA score 22. VSS. Chart reviewed. Patient seen and examined at bedside. Actively vomiting and with bilateral arm tremors. Received diazepam 10 mg IV at 5PM and 15 mg IV at 8PM. Currently has symptom-triggered benzodiazepines PRN ordered as per day admitter. Phenobarbital 130 mg x 1 ordered with instructions to re-evaluate CIWA one hour later. Repeat CIWA 18, additional phenobarbital 130 mg ordered with improvement of CIWA to 12. Continue to monitor on telemetry. Responded to RRT called at 9:04PM for elevated CIWA score 22. VSS. Chart reviewed. Patient seen and examined at bedside. Actively vomiting and with bilateral arm tremors. Received diazepam 10 mg IV at 5PM and 15 mg IV at 8PM. Currently has symptom-triggered benzodiazepines PRN ordered as per day admitter. Phenobarbital 130 mg x 1 ordered with instructions to re-evaluate CIWA one hour later. Repeat CIWA 18, additional phenobarbital 130 mg ordered with improvement of CIWA to 12. Continue to monitor on telemetry.    Addendum: CIWA 14 at 2:30AM. Will consult ICU PA to evaluate patient.

## 2025-07-10 LAB
ALBUMIN SERPL ELPH-MCNC: 3 G/DL — LOW (ref 3.3–5)
ALBUMIN SERPL ELPH-MCNC: 3.1 G/DL — LOW (ref 3.3–5)
ALP SERPL-CCNC: 65 U/L — SIGNIFICANT CHANGE UP (ref 40–120)
ALP SERPL-CCNC: 66 U/L — SIGNIFICANT CHANGE UP (ref 40–120)
ALT FLD-CCNC: 35 U/L — SIGNIFICANT CHANGE UP (ref 12–78)
ALT FLD-CCNC: 36 U/L — SIGNIFICANT CHANGE UP (ref 12–78)
ANION GAP SERPL CALC-SCNC: 6 MMOL/L — SIGNIFICANT CHANGE UP (ref 5–17)
ANION GAP SERPL CALC-SCNC: 7 MMOL/L — SIGNIFICANT CHANGE UP (ref 5–17)
AST SERPL-CCNC: 55 U/L — HIGH (ref 15–37)
AST SERPL-CCNC: 64 U/L — HIGH (ref 15–37)
BILIRUB DIRECT SERPL-MCNC: 0.5 MG/DL — HIGH (ref 0–0.3)
BILIRUB INDIRECT FLD-MCNC: 1.2 MG/DL — HIGH (ref 0.2–1)
BILIRUB SERPL-MCNC: 1.7 MG/DL — HIGH (ref 0.2–1.2)
BILIRUB SERPL-MCNC: 2 MG/DL — HIGH (ref 0.2–1.2)
BUN SERPL-MCNC: 11 MG/DL — SIGNIFICANT CHANGE UP (ref 7–23)
BUN SERPL-MCNC: 13 MG/DL — SIGNIFICANT CHANGE UP (ref 7–23)
CALCIUM SERPL-MCNC: 8.2 MG/DL — LOW (ref 8.5–10.1)
CALCIUM SERPL-MCNC: 8.5 MG/DL — SIGNIFICANT CHANGE UP (ref 8.5–10.1)
CHLORIDE SERPL-SCNC: 107 MMOL/L — SIGNIFICANT CHANGE UP (ref 96–108)
CHLORIDE SERPL-SCNC: 107 MMOL/L — SIGNIFICANT CHANGE UP (ref 96–108)
CO2 SERPL-SCNC: 26 MMOL/L — SIGNIFICANT CHANGE UP (ref 22–31)
CO2 SERPL-SCNC: 28 MMOL/L — SIGNIFICANT CHANGE UP (ref 22–31)
CREAT SERPL-MCNC: 1.02 MG/DL — SIGNIFICANT CHANGE UP (ref 0.5–1.3)
CREAT SERPL-MCNC: 1.05 MG/DL — SIGNIFICANT CHANGE UP (ref 0.5–1.3)
EGFR: 82 ML/MIN/1.73M2 — SIGNIFICANT CHANGE UP
EGFR: 82 ML/MIN/1.73M2 — SIGNIFICANT CHANGE UP
EGFR: 85 ML/MIN/1.73M2 — SIGNIFICANT CHANGE UP
EGFR: 85 ML/MIN/1.73M2 — SIGNIFICANT CHANGE UP
GLUCOSE SERPL-MCNC: 110 MG/DL — HIGH (ref 70–99)
GLUCOSE SERPL-MCNC: 161 MG/DL — HIGH (ref 70–99)
HCT VFR BLD CALC: 38.1 % — LOW (ref 39–50)
HGB BLD-MCNC: 11.7 G/DL — LOW (ref 13–17)
MAGNESIUM SERPL-MCNC: 1.7 MG/DL — SIGNIFICANT CHANGE UP (ref 1.6–2.6)
MAGNESIUM SERPL-MCNC: 1.7 MG/DL — SIGNIFICANT CHANGE UP (ref 1.6–2.6)
MCHC RBC-ENTMCNC: 23.9 PG — LOW (ref 27–34)
MCHC RBC-ENTMCNC: 30.7 G/DL — LOW (ref 32–36)
MCV RBC AUTO: 77.8 FL — LOW (ref 80–100)
NRBC BLD AUTO-RTO: 0 /100 WBCS — SIGNIFICANT CHANGE UP (ref 0–0)
PHOSPHATE SERPL-MCNC: 1.4 MG/DL — LOW (ref 2.5–4.5)
PHOSPHATE SERPL-MCNC: 2.6 MG/DL — SIGNIFICANT CHANGE UP (ref 2.5–4.5)
PLATELET # BLD AUTO: 160 K/UL — SIGNIFICANT CHANGE UP (ref 150–400)
POTASSIUM SERPL-MCNC: 3.4 MMOL/L — LOW (ref 3.5–5.3)
POTASSIUM SERPL-MCNC: 4.4 MMOL/L — SIGNIFICANT CHANGE UP (ref 3.5–5.3)
POTASSIUM SERPL-SCNC: 3.4 MMOL/L — LOW (ref 3.5–5.3)
POTASSIUM SERPL-SCNC: 4.4 MMOL/L — SIGNIFICANT CHANGE UP (ref 3.5–5.3)
PROT SERPL-MCNC: 7 GM/DL — SIGNIFICANT CHANGE UP (ref 6–8.3)
PROT SERPL-MCNC: 7.5 GM/DL — SIGNIFICANT CHANGE UP (ref 6–8.3)
RBC # BLD: 4.9 M/UL — SIGNIFICANT CHANGE UP (ref 4.2–5.8)
RBC # FLD: 29.2 % — HIGH (ref 10.3–14.5)
SODIUM SERPL-SCNC: 140 MMOL/L — SIGNIFICANT CHANGE UP (ref 135–145)
SODIUM SERPL-SCNC: 141 MMOL/L — SIGNIFICANT CHANGE UP (ref 135–145)
WBC # BLD: 3.16 K/UL — LOW (ref 3.8–10.5)
WBC # FLD AUTO: 3.16 K/UL — LOW (ref 3.8–10.5)

## 2025-07-10 PROCEDURE — 36000 PLACE NEEDLE IN VEIN: CPT | Mod: 77

## 2025-07-10 PROCEDURE — 76937 US GUIDE VASCULAR ACCESS: CPT | Mod: 26,59,77

## 2025-07-10 PROCEDURE — 93971 EXTREMITY STUDY: CPT | Mod: 26,RT

## 2025-07-10 PROCEDURE — 99231 SBSQ HOSP IP/OBS SF/LOW 25: CPT

## 2025-07-10 PROCEDURE — 99232 SBSQ HOSP IP/OBS MODERATE 35: CPT

## 2025-07-10 RX ORDER — SOD PHOS DI, MONO/K PHOS MONO 250 MG
2 TABLET ORAL ONCE
Refills: 0 | Status: COMPLETED | OUTPATIENT
Start: 2025-07-10 | End: 2025-07-10

## 2025-07-10 RX ORDER — PHENOBARBITAL 30 MG/1
129.6 TABLET ORAL EVERY 6 HOURS
Refills: 0 | Status: DISCONTINUED | OUTPATIENT
Start: 2025-07-10 | End: 2025-07-11

## 2025-07-10 RX ORDER — DIAZEPAM 5 MG/1
TABLET ORAL
Refills: 0 | Status: DISCONTINUED | OUTPATIENT
Start: 2025-07-10 | End: 2025-07-10

## 2025-07-10 RX ORDER — PHENOBARBITAL 30 MG/1
130 TABLET ORAL ONCE
Refills: 0 | Status: DISCONTINUED | OUTPATIENT
Start: 2025-07-10 | End: 2025-07-10

## 2025-07-10 RX ORDER — PHENOBARBITAL 30 MG/1
130 TABLET ORAL EVERY 4 HOURS
Refills: 0 | Status: DISCONTINUED | OUTPATIENT
Start: 2025-07-10 | End: 2025-07-14

## 2025-07-10 RX ORDER — DIAZEPAM 5 MG/1
15 TABLET ORAL EVERY 6 HOURS
Refills: 0 | Status: DISCONTINUED | OUTPATIENT
Start: 2025-07-10 | End: 2025-07-10

## 2025-07-10 RX ORDER — DIAZEPAM 5 MG/1
20 TABLET ORAL EVERY 6 HOURS
Refills: 0 | Status: DISCONTINUED | OUTPATIENT
Start: 2025-07-10 | End: 2025-07-10

## 2025-07-10 RX ORDER — SODIUM CHLORIDE 9 G/1000ML
1000 INJECTION, SOLUTION INTRAVENOUS
Refills: 0 | Status: DISCONTINUED | OUTPATIENT
Start: 2025-07-10 | End: 2025-07-14

## 2025-07-10 RX ADMIN — Medication 40 MILLIGRAM(S): at 11:17

## 2025-07-10 RX ADMIN — Medication 4 MILLIGRAM(S): at 23:44

## 2025-07-10 RX ADMIN — FOLIC ACID 1 MILLIGRAM(S): 1 TABLET ORAL at 11:17

## 2025-07-10 RX ADMIN — PHENOBARBITAL 129.6 MILLIGRAM(S): 30 TABLET ORAL at 19:32

## 2025-07-10 RX ADMIN — SODIUM CHLORIDE 80 MILLILITER(S): 9 INJECTION, SOLUTION INTRAVENOUS at 17:20

## 2025-07-10 RX ADMIN — PHENOBARBITAL 129.6 MILLIGRAM(S): 30 TABLET ORAL at 13:43

## 2025-07-10 RX ADMIN — Medication 2 MILLIGRAM(S): at 18:21

## 2025-07-10 RX ADMIN — PHENOBARBITAL 130 MILLIGRAM(S): 30 TABLET ORAL at 05:24

## 2025-07-10 RX ADMIN — SODIUM CHLORIDE 125 MILLILITER(S): 9 INJECTION, SOLUTION INTRAVENOUS at 06:59

## 2025-07-10 RX ADMIN — DIAZEPAM 15 MILLIGRAM(S): 5 TABLET ORAL at 14:15

## 2025-07-10 RX ADMIN — LACTULOSE 20 GRAM(S): 10 SOLUTION ORAL at 05:20

## 2025-07-10 RX ADMIN — Medication 2 MILLIGRAM(S): at 17:21

## 2025-07-10 RX ADMIN — DIAZEPAM 15 MILLIGRAM(S): 5 TABLET ORAL at 09:25

## 2025-07-10 RX ADMIN — Medication 2 MILLIGRAM(S): at 05:55

## 2025-07-10 RX ADMIN — SODIUM CHLORIDE 80 MILLILITER(S): 9 INJECTION, SOLUTION INTRAVENOUS at 11:17

## 2025-07-10 RX ADMIN — Medication 100 MILLIGRAM(S): at 11:17

## 2025-07-10 RX ADMIN — Medication 2 MILLIGRAM(S): at 05:20

## 2025-07-10 RX ADMIN — Medication 1 TABLET(S): at 11:17

## 2025-07-10 RX ADMIN — Medication 2 PACKET(S): at 19:32

## 2025-07-10 RX ADMIN — PHENOBARBITAL 130 MILLIGRAM(S): 30 TABLET ORAL at 02:40

## 2025-07-10 NOTE — PATIENT PROFILE ADULT - FALL HARM RISK - HARM RISK INTERVENTIONS
Assistance with ambulation/Assistance OOB with selected safe patient handling equipment/Communicate Risk of Fall with Harm to all staff/Discuss with provider need for PT consult/Monitor for mental status changes/Monitor gait and stability/Provide patient with walking aids - walker, cane, crutches/Reinforce activity limits and safety measures with patient and family/Tailored Fall Risk Interventions/Toileting schedule using arm’s reach rule for commode and bathroom/Use of alarms - bed, chair and/or voice tab/Visual Cue: Yellow wristband and red socks/Bed in lowest position, wheels locked, appropriate side rails in place/Call bell, personal items and telephone in reach/Instruct patient to call for assistance before getting out of bed or chair/Non-slip footwear when patient is out of bed/[place] to call system/Physically safe environment - no spills, clutter or unnecessary equipment/Purposeful Proactive Rounding/Room/bathroom lighting operational, light cord in reach

## 2025-07-10 NOTE — BH CONSULTATION LIAISON PROGRESS NOTE - OTHER
low end of fair  has reactivity  deferred  adequate  returning to baseline  reflective of context  continues to be limited into the extent of his alcohol addiction  appropriate

## 2025-07-10 NOTE — PROCEDURE NOTE - NSICDXPROCEDURE_GEN_ALL_CORE_FT
PROCEDURES:  Insertion of intravenous catheter with ultrasound guidance 10-Jul-2025 18:59:12  Murphy Raza

## 2025-07-10 NOTE — PROGRESS NOTE ADULT - ASSESSMENT
60 years old male     with h/o alcohol abuse, liver cirrhosis      present to ED with vomiting, epigastric pain and alcohol withdrawal. had   nausea and vomiting for a few days, .      has recurrent hospitalization for alcohol intoxication and alcohol withdrawal complicated by DT     serum alcohol 237. EKG with NSR, LVH       ETOH    abuse. / : Alcohol withdrawal syndrome.     serum alcohol 237   recurrent hospitalization for alcohol intoxication and alcohol withdrawal   on CIWA/  iv  fluids     pain control--> IV morphine prn for severe pain /  seen by  icu/  rejected  Liver cirrhosis, alcoholic.      on   rifaximin and lactulose  Alcohol cessation ,  was  advised     e cho,,  normal  ef    encounter  time  35  minutes       rad< from: CT Abdomen and Pelvis w/ IV Cont (06.03.25 @ 07:11) >  IMPRESSION:  1.  No acute abdominal/pelvic pathology.  2.  Cirrhotic liver and portal hypertension  3.  Etiology of pain not determined  --- End of Report ---      < 	  60 years old male     with h/o alcohol abuse, liver cirrhosis      present to ED with vomiting, epigastric pain and alcohol withdrawal. had   nausea and vomiting for a few days, .      has recurrent hospitalization for alcohol intoxication and alcohol withdrawal complicated by DT     serum alcohol 237. EKG with NSR, LVH     ETOH    abuse. / : Alcohol withdrawal syndrome.     serum alcohol 237   recurrent hospitalization for alcohol intoxication and alcohol withdrawal   on CIWA/  iv  fluids     pain control--> IV morphine prn for severe pain /  seen by  icu/  rejected  Liver cirrhosis, alcoholic.      on   rifaximin and lactulose  Alcohol cessation ,  was  advised    echo,   normal  ef    swollel, left  arm,  form iv infiltration  .  no  drainage/  and   doppelr,  no  dvt   pt   with some  agitation  on  ciwa   and   phenobarb    encounter  time  35  minutes       rad< from: CT Abdomen and Pelvis w/ IV Cont (06.03.25 @ 07:11) >  IMPRESSION:  1.  No acute abdominal/pelvic pathology.  2.  Cirrhotic liver and portal hypertension  3.  Etiology of pain not determined  --- End of Report ---      <

## 2025-07-10 NOTE — CHART NOTE - NSCHARTNOTEFT_GEN_A_CORE
MPA Note    Pt CIWA remained 14 s/p second dose of Phenobarb. Per Dr. Fitzgerald, ICU contacted. Per ICU ACP, give another dose of Phenobarb. IV access reobtained and Phenobarb ordered x 1.    As discussed with nocturnist Dr. Bryant, Phenobarb taper to be ordered. MPA Note    Pt CIWA remained 14 on hour after second dose of Phenobarb. Per Dr. Fitzgerald, ICU contacted. Per ICU ACP, give another dose of Phenobarb. IV access reobtained and Phenobarb ordered x 1.    As discussed with nocturnist Dr. Bryant, Phenobarb taper to be ordered. Will sign out to AM team.

## 2025-07-10 NOTE — PROGRESS NOTE ADULT - SUBJECTIVE AND OBJECTIVE BOX
date of service: 07-10-25 @ 09:55  [facility]  REVIEW OF SYSTEMS:  CONSTITUTIONAL: No fever,  no  weight loss  ENT:  No  tinnitus,   no   vertigo  NECK: No pain or stiffness  RESPIRATORY: No cough, wheezing, chills or hemoptysis;    No Shortness of Breath  CARDIOVASCULAR: No chest pain, palpitations, dizziness  GASTROINTESTINAL: No abdominal or epigastric pain. No nausea, vomiting, or hematemesis; No diarrhea  No melena or hematochezia.  GENITOURINARY: No dysuria, frequency, hematuria, or incontinence  NEUROLOGICAL: No headaches  SKIN: No itching,  no   rash  LYMPH Nodes: No enlarged glands  ENDOCRINE: No heat or cold intolerance  MUSCULOSKELETAL: No joint pain or swelling  PSYCHIATRIC: No depression, anxiety  HEME/LYMPH: No easy bruising, or bleeding gums  ALLERGY AND IMMUNOLOGIC: No hives or eczema	    MEDICATIONS  (STANDING):  ferrous    sulfate 325 milliGRAM(s) Oral daily  folic acid 1 milliGRAM(s) Oral daily  lactated ringers. 1000 milliLiter(s) (125 mL/Hr) IV Continuous <Continuous>  lactulose Syrup 20 Gram(s) Oral two times a day  multivitamin 1 Tablet(s) Oral daily  pantoprazole  Injectable 40 milliGRAM(s) IV Push daily  PHENobarbital 129.6 milliGRAM(s) Oral every 6 hours  rifAXIMin 550 milliGRAM(s) Oral two times a day  thiamine 100 milliGRAM(s) Oral daily    MEDICATIONS  (PRN):  acetaminophen     Tablet .. 650 milliGRAM(s) Oral every 6 hours PRN Mild Pain (1 - 3), Moderate Pain (4 - 6)  aluminum hydroxide/magnesium hydroxide/simethicone Suspension 30 milliLiter(s) Oral every 4 hours PRN Dyspepsia  diazepam  Injectable 10 milliGRAM(s) IV Push every 2 hours PRN CIWA score 8 to 12  diazepam  Injectable 15 milliGRAM(s) IV Push every 2 hours PRN CIWA > 13 or higher  melatonin 3 milliGRAM(s) Oral at bedtime PRN Insomnia  morphine  - Injectable 2 milliGRAM(s) IV Push every 6 hours PRN Severe Pain (7 - 10)  ondansetron Injectable 4 milliGRAM(s) IV Push every 6 hours PRN Nausea and/or Vomiting      Vital Signs Last 24 Hrs  T(C): 36.6 (10 Jul 2025 06:08), Max: 37.1 (09 Jul 2025 22:03)  T(F): 97.9 (10 Jul 2025 06:08), Max: 98.7 (09 Jul 2025 22:03)  HR: 49 (10 Jul 2025 06:08) (49 - 113)  BP: 139/85 (10 Jul 2025 06:08) (138/86 - 164/96)  BP(mean): 110 (09 Jul 2025 22:03) (109 - 112)  RR: 16 (10 Jul 2025 06:08) (14 - 23)  SpO2: 97% (10 Jul 2025 06:08) (97% - 100%)    Parameters below as of 10 Jul 2025 06:08  Patient On (Oxygen Delivery Method): nasal cannula  O2 Flow (L/min): 2    CAPILLARY BLOOD GLUCOSE      POCT Blood Glucose.: 143 mg/dL (09 Jul 2025 21:05)    I&O's Summary        Appearance: Normal	  HEENT:   Normal oral mucosa, PERRL, EOMI	  Lymphatic: No lymphadenopathy  Cardiovascular: Normal S1 S2, No JVD  Respiratory: Lungs clear to auscultation	  Gastrointestinal:  Soft, Non-tender, + BS	  Skin: No rash, No ecchymoses	  Extremities:     LABS:                        11.7   3.16  )-----------( 160      ( 10 Jul 2025 08:20 )             38.1     07-10    141  |  107  |  13  ----------------------------<  110[H]  4.4   |  28  |  1.02    Ca    8.5      10 Jul 2025 08:20  Phos  2.6     07-10  Mg     1.7     07-10    TPro  7.5  /  Alb  3.1[L]  /  TBili  2.0[H]  /  DBili  x   /  AST  64[H]  /  ALT  36  /  AlkPhos  66  07-10          Urinalysis Basic - ( 10 Jul 2025 08:20 )    Color: x / Appearance: x / SG: x / pH: x  Gluc: 110 mg/dL / Ketone: x  / Bili: x / Urobili: x   Blood: x / Protein: x / Nitrite: x   Leuk Esterase: x / RBC: x / WBC x   Sq Epi: x / Non Sq Epi: x / Bacteria: x                      Consultant(s) Notes Reviewed:      Care Discussed with Consultants/Other Providers:

## 2025-07-10 NOTE — PROGRESS NOTE ADULT - SUBJECTIVE AND OBJECTIVE BOX
Asked to see this patient  regarding STEWART with a  CIWA score of  14    Chart reviewed, patient examined.      HX:    Multiple admissions for STEWART.  Last fall he was at [facility] with fulminant hepatic failure requiring PLEX.      He came in yesterday morning intoxicated then started with withdrawal symptoms treated with  symptom triggered valium.      Based on hx, this patient is high risk for severe STEWART and  DT's.  He should always be on placed on a standing appropriate dose moderate duration benzodiazapine with symptom triggered PRN extra doses.  He should never be on symptom triggered dosing alone.    He is currently  resting comfortably  after a few phenobarbital doses.   CIWA to my eye is 4 right now he does not need ICU level care.    I see that some standing  Phenobarbital  doses have been ordered.  He does have cirrhosis, and phenobarbital is a great drug in STEWART in patients without cirrhosis.      That dosen't mean it cant be used here,  I would suggest limiting the cumulative dose to no more than 5-10mg/kg.      Please call with questions

## 2025-07-11 LAB
ANION GAP SERPL CALC-SCNC: 6 MMOL/L — SIGNIFICANT CHANGE UP (ref 5–17)
BILIRUB SERPL-MCNC: 1.6 MG/DL — HIGH (ref 0.2–1.2)
BUN SERPL-MCNC: 8 MG/DL — SIGNIFICANT CHANGE UP (ref 7–23)
CALCIUM SERPL-MCNC: 8.8 MG/DL — SIGNIFICANT CHANGE UP (ref 8.5–10.1)
CHLORIDE SERPL-SCNC: 106 MMOL/L — SIGNIFICANT CHANGE UP (ref 96–108)
CO2 SERPL-SCNC: 27 MMOL/L — SIGNIFICANT CHANGE UP (ref 22–31)
CREAT SERPL-MCNC: 0.85 MG/DL — SIGNIFICANT CHANGE UP (ref 0.5–1.3)
CREAT SERPL-MCNC: 0.86 MG/DL — SIGNIFICANT CHANGE UP (ref 0.5–1.3)
EGFR: 100 ML/MIN/1.73M2 — SIGNIFICANT CHANGE UP
EGFR: 100 ML/MIN/1.73M2 — SIGNIFICANT CHANGE UP
EGFR: 101 ML/MIN/1.73M2 — SIGNIFICANT CHANGE UP
EGFR: 101 ML/MIN/1.73M2 — SIGNIFICANT CHANGE UP
GLUCOSE SERPL-MCNC: 82 MG/DL — SIGNIFICANT CHANGE UP (ref 70–99)
INR BLD: 1.22 RATIO — HIGH (ref 0.85–1.16)
MAGNESIUM SERPL-MCNC: 1.7 MG/DL — SIGNIFICANT CHANGE UP (ref 1.6–2.6)
MELD SCORE WITH DIALYSIS: 24 POINTS — SIGNIFICANT CHANGE UP
MELD SCORE WITHOUT DIALYSIS: 10 POINTS — SIGNIFICANT CHANGE UP
PHOSPHATE SERPL-MCNC: 2.9 MG/DL — SIGNIFICANT CHANGE UP (ref 2.5–4.5)
POTASSIUM SERPL-MCNC: 3.5 MMOL/L — SIGNIFICANT CHANGE UP (ref 3.5–5.3)
POTASSIUM SERPL-SCNC: 3.5 MMOL/L — SIGNIFICANT CHANGE UP (ref 3.5–5.3)
PROTHROM AB SERPL-ACNC: 14.1 SEC — HIGH (ref 9.9–13.4)
SODIUM SERPL-SCNC: 139 MMOL/L — SIGNIFICANT CHANGE UP (ref 135–145)
SODIUM SERPL-SCNC: 139 MMOL/L — SIGNIFICANT CHANGE UP (ref 135–145)

## 2025-07-11 PROCEDURE — G0545: CPT

## 2025-07-11 PROCEDURE — 99231 SBSQ HOSP IP/OBS SF/LOW 25: CPT

## 2025-07-11 PROCEDURE — 93971 EXTREMITY STUDY: CPT | Mod: 26,LT

## 2025-07-11 PROCEDURE — 99222 1ST HOSP IP/OBS MODERATE 55: CPT

## 2025-07-11 PROCEDURE — 99232 SBSQ HOSP IP/OBS MODERATE 35: CPT

## 2025-07-11 RX ORDER — MAGNESIUM SULFATE 500 MG/ML
1 SYRINGE (ML) INJECTION ONCE
Refills: 0 | Status: COMPLETED | OUTPATIENT
Start: 2025-07-11 | End: 2025-07-11

## 2025-07-11 RX ORDER — PHENOBARBITAL 30 MG/1
129.6 TABLET ORAL ONCE
Refills: 0 | Status: DISCONTINUED | OUTPATIENT
Start: 2025-07-11 | End: 2025-07-11

## 2025-07-11 RX ORDER — PHENOBARBITAL 30 MG/1
32.4 TABLET ORAL
Refills: 0 | Status: COMPLETED | OUTPATIENT
Start: 2025-07-14 | End: 2025-07-14

## 2025-07-11 RX ORDER — CEFAZOLIN SODIUM IN 0.9 % NACL 3 G/100 ML
1000 INTRAVENOUS SOLUTION, PIGGYBACK (ML) INTRAVENOUS ONCE
Refills: 0 | Status: COMPLETED | OUTPATIENT
Start: 2025-07-11 | End: 2025-07-11

## 2025-07-11 RX ORDER — VANCOMYCIN HCL IN 5 % DEXTROSE 1.5G/250ML
1000 PLASTIC BAG, INJECTION (ML) INTRAVENOUS ONCE
Refills: 0 | Status: COMPLETED | OUTPATIENT
Start: 2025-07-11 | End: 2025-07-11

## 2025-07-11 RX ORDER — CEFAZOLIN SODIUM IN 0.9 % NACL 3 G/100 ML
1000 INTRAVENOUS SOLUTION, PIGGYBACK (ML) INTRAVENOUS EVERY 8 HOURS
Refills: 0 | Status: DISCONTINUED | OUTPATIENT
Start: 2025-07-11 | End: 2025-07-11

## 2025-07-11 RX ORDER — CEFAZOLIN SODIUM IN 0.9 % NACL 3 G/100 ML
INTRAVENOUS SOLUTION, PIGGYBACK (ML) INTRAVENOUS
Refills: 0 | Status: DISCONTINUED | OUTPATIENT
Start: 2025-07-11 | End: 2025-07-11

## 2025-07-11 RX ORDER — PHENOBARBITAL 30 MG/1
64.8 TABLET ORAL
Refills: 0 | Status: DISCONTINUED | OUTPATIENT
Start: 2025-07-13 | End: 2025-07-13

## 2025-07-11 RX ORDER — PHENOBARBITAL 30 MG/1
129.6 TABLET ORAL
Refills: 0 | Status: DISCONTINUED | OUTPATIENT
Start: 2025-07-12 | End: 2025-07-12

## 2025-07-11 RX ORDER — PHENOBARBITAL 30 MG/1
129.6 TABLET ORAL EVERY 6 HOURS
Refills: 0 | Status: DISCONTINUED | OUTPATIENT
Start: 2025-07-11 | End: 2025-07-11

## 2025-07-11 RX ADMIN — PHENOBARBITAL 130 MILLIGRAM(S): 30 TABLET ORAL at 21:53

## 2025-07-11 RX ADMIN — Medication 40 MILLIGRAM(S): at 13:51

## 2025-07-11 RX ADMIN — FOLIC ACID 1 MILLIGRAM(S): 1 TABLET ORAL at 14:03

## 2025-07-11 RX ADMIN — Medication 1 TABLET(S): at 14:03

## 2025-07-11 RX ADMIN — PHENOBARBITAL 129.6 MILLIGRAM(S): 30 TABLET ORAL at 00:29

## 2025-07-11 RX ADMIN — PHENOBARBITAL 130 MILLIGRAM(S): 30 TABLET ORAL at 13:50

## 2025-07-11 RX ADMIN — PHENOBARBITAL 130 MILLIGRAM(S): 30 TABLET ORAL at 16:50

## 2025-07-11 RX ADMIN — Medication 100 MILLIGRAM(S): at 13:52

## 2025-07-11 RX ADMIN — LACTULOSE 20 GRAM(S): 10 SOLUTION ORAL at 06:50

## 2025-07-11 RX ADMIN — Medication 100 GRAM(S): at 13:54

## 2025-07-11 RX ADMIN — Medication 100 MILLIGRAM(S): at 13:48

## 2025-07-11 RX ADMIN — Medication 650 MILLIGRAM(S): at 16:41

## 2025-07-11 NOTE — BH CONSULTATION LIAISON PROGRESS NOTE - NSBHCHARTREVIEWLAB_PSY_A_CORE FT
07-10    140  |  107  |  11  ----------------------------<  161[H]  3.4[L]   |  26  |  1.05    Ca    8.2[L]      10 Jul 2025 12:24  Phos  1.4     07-10  Mg     1.7     07-10    TPro  7.0  /  Alb  3.0[L]  /  TBili  1.7[H]  /  DBili  0.5[H]  /  AST  55[H]  /  ALT  35  /  AlkPhos  65  07-10  
07-11    139  |  106  |  8   ----------------------------<  82  3.5   |  27  |  0.85    Ca    8.8      11 Jul 2025 05:59  Phos  2.9     07-11  Mg     1.7     07-11    TPro  x   /  Alb  x   /  TBili  1.6[H]  /  DBili  x   /  AST  x   /  ALT  x   /  AlkPhos  x   07-11

## 2025-07-11 NOTE — CONSULT NOTE ADULT - SUBJECTIVE AND OBJECTIVE BOX
[facility]  INFECTIOUS DISEASES   [address] [place] [address]  Tel: [phone]     Fax: [phone]  ==============================================================================  DO Puma Mao MD Sehrish Shahid, MD Alexandra Gutman, NP   ==============================================================================    VANDANA VILLA  [id number]  Male  60y ([birth year])        Patient is a 60y old  Male who presents with a chief complaint of alcohol withdrawal, alcoholic gastritis (11 Jul 2025 13:16)      HPI:  60 years old male with h/o alcohol abuse, liver cirrhosis present to ED with vomiting, epigastric pain and alcohol withdrawal. Patient reported nausea and vomiting for a few days, associated with upper abdominal pain. Patient has recurrent hospitalization for alcohol intoxication and alcohol withdrawal complicated by DT. Patient is not honest with alcohol intake. Initially stated that last drink was a few days ago but later stated that he had some drink last night to help him sleep after telling him labs with elevated serum alcohol level.   Hemodynamically stable, afebrile. No leukocytosis, plt 244, Cr 1.22, serum alcohol 237. 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      History of hematemesis      History of aspiration pneumonia      History of alcohol withdrawal delirium      Alcohol use disorder, severe, dependence      No significant past surgical history          Allergies  No Known Allergies        ANTIMICROBIALS:  rifAXIMin 550 two times a day      MEDICATIONS  (STANDING):    ceFAZolin   IVPB   100 mL/Hr IV Intermittent (07-11-25 @ 13:48)    rifAXIMin   550 milliGRAM(s) Oral (07-11-25 @ 06:51)   550 milliGRAM(s) Oral (07-10-25 @ 19:33)   550 milliGRAM(s) Oral (07-10-25 @ 05:19)        OTHER MEDS: MEDICATIONS  (STANDING):  acetaminophen     Tablet .. 650 every 6 hours PRN  lactulose Syrup 20 two times a day  melatonin 3 at bedtime PRN  ondansetron Injectable 4 every 6 hours PRN  pantoprazole  Injectable 40 daily  PHENobarbital Injectable 130 every 4 hours PRN      SOCIAL HISTORY:     Smoking Cigarettes [ ]Active [ ] Former [ ]Denies   ETOH [ ]denies [ ]Former [ ]Current Use  Drug Use [ ]Never [ ] Former [ ] Active     FAMILY HISTORY:  FH: HTN (hypertension)        REVIEW OF SYSTEMS  [  ] ROS unobtainable because:    [  ] All other systems negative except as noted below:	    Constitutional:  [ ] fever [ ] chills  [ ] weight loss  [ ] weakness  Skin:  [ ] rash [ ] phlebitis	  Eyes: [ ] icterus [ ] pain  [ ] discharge	  ENMT: [ ] sore throat  [ ] thrush [ ] ulcers [ ] exudates  Respiratory: [ ] dyspnea [ ] hemoptysis [ ] cough [ ] sputum	  Cardiovascular:  [ ] chest pain [ ] palpitations [ ] edema	  Gastrointestinal:  [ ] nausea [ ] vomiting [ ] diarrhea [ ] constipation [ ] pain	  Genitourinary:  [ ] dysuria [ ] frequency [ ] hematuria [ ] discharge [ ] flank pain  [ ] incontinence  Musculoskeletal:  [ ] myalgias [ ] arthralgias [ ] arthritis  [ ] back pain  Neurological:  [ ] headache [ ] seizures  [ ] confusion/altered mental status  Psychiatric:  [ ] anxiety [ ] depression	  Hematology/Lymphatics:  [ ] lymphadenopathy  Endocrine:  [ ] adrenal [ ] thyroid  Allergic/Immunologic:	 [ ] transplant [ ] seasonal    Vital Signs Last 24 Hrs  T(F): 98.3 (07-11-25 @ 11:12), Max: 98.7 (07-09-25 @ 22:03)    Vital Signs Last 24 Hrs  HR: 69 (07-11-25 @ 11:12) (55 - 69)  BP: 128/88 (07-11-25 @ 11:12) (112/83 - 136/86)  RR: 18 (07-11-25 @ 11:12)  SpO2: 100% (07-11-25 @ 11:12) (97% - 100%)  Wt(kg): --    PHYSICAL EXAM:  Constitutional: non-toxic, no distress  HEAD/EYES: anicteric, no conjunctival injection  ENT:  supple, no thrush  Cardiovascular:   normal S1, S2, no murmur, no edema  Respiratory:  clear BS bilaterally, no wheezes, no rales  GI:  soft, non-tender, normal bowel sounds  :  no saxena, no CVA tenderness  Musculoskeletal:  no synovitis, normal ROM  Neurologic: awake and alert, normal strength, no focal findings  Skin:  no rash, no erythema, no phlebitis  Heme/Onc: no lymphadenopathy   Psychiatric:  awake, alert, appropriate mood        WBC  WBC Count: 3.16 K/uL (07-10 @ 08:20)  WBC Count: 4.18 K/uL (07-09 @ 08:47)        CBC                        11.7   3.16  )-----------( 160      ( 10 Jul 2025 08:20 )             38.1     BMP/CMP  07-11    139  |  106  |  8   ----------------------------<  82  3.5   |  27  |  0.85    Ca    8.8      11 Jul 2025 05:59  Phos  2.9     07-11  Mg     1.7     07-11    TPro  x   /  Alb  x   /  TBili  1.6[H]  /  DBili  x   /  AST  x   /  ALT  x   /  AlkPhos  x   07-11    Creatinine Trend  Creatinine Trend: 0.85<--, 1.05<--, 1.02<--, 1.22<--, 0.89<--, 0.88<--    Lipase: 48 U/L (07-09-25 @ 08:47)      MICROBIOLOGY:      RADIOLOGY:  < from: US Duplex Venous Upper Ext Ltd, Right (07.10.25 @ 12:13) >    ACC: [id number] EXAM:  US DPLX UPR EXT VEINS LTD RT   ORDERED BY: ARNALDO GRIGSBY     PROCEDURE DATE:  07/10/2025          INTERPRETATION:  CLINICAL INFORMATION: 60 years  Male with right upper   extremity swelling.    COMPARISON: 9/24/2024    TECHNIQUE: Duplex sonography of the RIGHT UPPER extremity veins with   color and spectral Doppler, with and without compression.    FINDINGS:    The right internal jugular, subclavian, axillary and brachial veins are   patent and compressible where applicable.  The basilic vein (superficial   vein) is patent and without thrombus.  The cephalic vein (superficial   vein) is patent and without thrombus.    Doppler examination shows normal spontaneous and phasic flow.    Visualized portions of the left internal jugular and subclavian veins are   patent.    IMPRESSION:  No evidence of right upper extremity deep venous thrombosis.        --- End of Report ---            BINDU ROBLEDO MD; Attending Radiologist  This document has been electronically signed. Jul 10 2025 12:17PM    < end of copied text >   [facility]  INFECTIOUS DISEASES   [address] [place] [address]  Tel: [phone]     Fax: [phone]  ==============================================================================  DO Puma Mao MD Sehrish Shahid, MD Alexandra Gutman, NP   ==============================================================================    VANDANA VILLA  [id number]  Male  60y ([birth year])        Patient is a 60y old  Male who presents with a chief complaint of alcohol withdrawal, alcoholic gastritis (11 Jul 2025 13:16)      HPI:  59 y/o man with h/o alcohol abuse, liver cirrhosis present to ED with vomiting, epigastric pain and alcohol withdrawal. Patient reported nausea and vomiting for a few days, associated with upper abdominal pain. Patient has recurrent hospitalization for alcohol intoxication and alcohol withdrawal complicated by DT. Patient is not honest with alcohol intake. Initially stated that last drink was a few days ago but later stated that he had some drink last night to help him sleep after telling him labs with elevated serum alcohol level.   Hemodynamically stable, afebrile. No leukocytosis, plt 244, Cr 1.22, serum alcohol 237. EKG with NSR, LVH (09 Jul 2025 13:06)  Noted with LUE swelling x 1 day. 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 650 every 6 hours PRN  lactulose Syrup 20 two times a day  melatonin 3 at bedtime PRN  ondansetron Injectable 4 every 6 hours PRN  pantoprazole  Injectable 40 daily  PHENobarbital Injectable 130 every 4 hours PRN      SOCIAL HISTORY:     Smoking Cigarettes [ ]Active [ ] Former [ ]Denies   ETOH [ ]denies [ ]Former [ ]Current Use  Drug Use [ ]Never [ ] Former [ ] Active     FAMILY HISTORY:  FH: HTN (hypertension)      REVIEW OF SYSTEMS  [  ] ROS unobtainable because:    [  ] All other systems negative except as noted below:	    Constitutional:  [ ] fever [ ] chills  [ ] weight loss  [ ] weakness  Skin:  [ ] rash [ ] phlebitis	  Eyes: [ ] icterus [ ] pain  [ ] discharge	  ENMT: [ ] sore throat  [ ] thrush [ ] ulcers [ ] exudates  Respiratory: [ ] dyspnea [ ] hemoptysis [ ] cough [ ] sputum	  Cardiovascular:  [ ] chest pain [ ] palpitations [ ] edema	  Gastrointestinal:  [ ] nausea [ ] vomiting [ ] diarrhea [ ] constipation [ ] pain	  Genitourinary:  [ ] dysuria [ ] frequency [ ] hematuria [ ] discharge [ ] flank pain  [ ] incontinence  Musculoskeletal:  [ ] myalgias [ ] arthralgias [ ] arthritis  [ ] back pain  Neurological:  [ ] headache [ ] seizures  [ ] confusion/altered mental status  Psychiatric:  [ ] anxiety [ ] depression	  Hematology/Lymphatics:  [ ] lymphadenopathy  Endocrine:  [ ] adrenal [ ] thyroid  Allergic/Immunologic:	 [ ] transplant [ ] seasonal    Vital Signs Last 24 Hrs  T(F): 98.3 (07-11-25 @ 11:12), Max: 98.7 (07-09-25 @ 22:03)    Vital Signs Last 24 Hrs  HR: 69 (07-11-25 @ 11:12) (55 - 69)  BP: 128/88 (07-11-25 @ 11:12) (112/83 - 136/86)  RR: 18 (07-11-25 @ 11:12)  SpO2: 100% (07-11-25 @ 11:12) (97% - 100%)  Wt(kg): --    PHYSICAL EXAM:  Constitutional: non-toxic, no distress  HEAD/EYES: anicteric, no conjunctival injection  ENT:  supple, no thrush  Cardiovascular:   normal S1, S2, no murmur, no edema  Respiratory:  clear BS bilaterally, no wheezes, no rales  GI:  soft, non-tender, normal bowel sounds  :  no saxena, no CVA tenderness  Musculoskeletal:  no synovitis, normal ROM  Neurologic: awake and alert, normal strength, no focal findings  Skin:  no rash, no erythema, no phlebitis  Heme/Onc: no lymphadenopathy   Psychiatric:  awake, alert, appropriate mood      WBC  WBC Count: 3.16 K/uL (07-10 @ 08:20)  WBC Count: 4.18 K/uL (07-09 @ 08:47)    CBC                        11.7   3.16  )-----------( 160      ( 10 Jul 2025 08:20 )             38.1     BMP/CMP  07-11    139  |  106  |  8   ----------------------------<  82  3.5   |  27  |  0.85    Ca    8.8      11 Jul 2025 05:59  Phos  2.9     07-11  Mg     1.7     07-11    TPro  x   /  Alb  x   /  TBili  1.6[H]  /  DBili  x   /  AST  x   /  ALT  x   /  AlkPhos  x   07-11    Creatinine Trend  Creatinine Trend: 0.85<--, 1.05<--, 1.02<--, 1.22<--, 0.89<--, 0.88<--    Lipase: 48 U/L (07-09-25 @ 08:47)      MICROBIOLOGY:      RADIOLOGY:  < from: US Duplex Venous Upper Ext Ltd, Right (07.10.25 @ 12:13) >    ACC: [id number] EXAM:  US DPLX UPR EXT VEINS LTD RT   ORDERED BY: ARNALDO GRIGSBY     PROCEDURE DATE:  07/10/2025          INTERPRETATION:  CLINICAL INFORMATION: 60 years  Male with right upper   extremity swelling.    COMPARISON: 9/24/2024    TECHNIQUE: Duplex sonography of the RIGHT UPPER extremity veins with   color and spectral Doppler, with and without compression.    FINDINGS:    The right internal jugular, subclavian, axillary and brachial veins are   patent and compressible where applicable.  The basilic vein (superficial   vein) is patent and without thrombus.  The cephalic vein (superficial   vein) is patent and without thrombus.    Doppler examination shows normal spontaneous and phasic flow.    Visualized portions of the left internal jugular and subclavian veins are   patent.    IMPRESSION:  No evidence of right upper extremity deep venous thrombosis.    --- End of Report ---     [facility]  INFECTIOUS DISEASES   [address] [place] [address]  Tel: [phone]     Fax: [phone]  ==============================================================================  DO Puma Mao MD Sehrish Shahid, MD Alexandra Gutman, NP   ==============================================================================    VANDANA VILLA  [id number]  Male  60y ([birth year])        Patient is a 60y old  Male who presents with a chief complaint of alcohol withdrawal, alcoholic gastritis (11 Jul 2025 13:16)      HPI:  61 y/o man with h/o alcohol abuse, liver cirrhosis present to ED with vomiting, epigastric pain and alcohol withdrawal. Patient reported nausea and vomiting for a few days, associated with upper abdominal pain. Patient has recurrent hospitalization for alcohol intoxication and alcohol withdrawal complicated by DT. Patient is not honest with alcohol intake. Initially stated that last drink was a few days ago but later stated that he had some drink last night to help him sleep after telling him labs with elevated serum alcohol level.   Hemodynamically stable, afebrile. No leukocytosis, plt 244, Cr 1.22, serum alcohol 237. EKG with NSR, LVH (09 Jul 2025 13:06) Noted with LUE swelling x 1 day. 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 650 every 6 hours PRN  lactulose Syrup 20 two times a day  melatonin 3 at bedtime PRN  ondansetron Injectable 4 every 6 hours PRN  pantoprazole  Injectable 40 daily  PHENobarbital Injectable 130 every 4 hours PRN      SOCIAL HISTORY:     Smoking Cigarettes [ ]Active [ ] Former [ ]Denies   ETOH [ ]denies [ ]Former [x]Current Use  Drug Use [ ]Never [ ] Former [ ] Active     FAMILY HISTORY:  FH: HTN (hypertension)      REVIEW OF SYSTEMS  [  ] ROS unobtainable because:    [ x ] All other systems negative except as noted below:	    Constitutional:  [ ] fever [ ] chills  [ ] weight loss  [ ] weakness  Skin:  [ ] rash [ ] phlebitis	  Eyes: [ ] icterus [ ] pain  [ ] discharge	  ENMT: [ ] sore throat  [ ] thrush [ ] ulcers [ ] exudates  Respiratory: [ ] dyspnea [ ] hemoptysis [ ] cough [ ] sputum	  Cardiovascular:  [ ] chest pain [ ] palpitations [ ] edema	  Gastrointestinal:  [ ] nausea [ ] vomiting [ ] diarrhea [ ] constipation [ ] pain	  Genitourinary:  [ ] dysuria [ ] frequency [ ] hematuria [ ] discharge [ ] flank pain  [ ] incontinence  Musculoskeletal:  [ ] myalgias [ ] arthralgias [ ] arthritis  [ ] back pain  Neurological:  [ ] headache [ ] seizures  [x] confusion/altered mental status  Psychiatric:  [ ] anxiety [ ] depression	  Hematology/Lymphatics:  [ ] lymphadenopathy  Endocrine:  [ ] adrenal [ ] thyroid  Allergic/Immunologic:	 [ ] transplant [ ] seasonal    Vital Signs Last 24 Hrs  T(F): 98.3 (07-11-25 @ 11:12), Max: 98.7 (07-09-25 @ 22:03)    Vital Signs Last 24 Hrs  HR: 69 (07-11-25 @ 11:12) (55 - 69)  BP: 128/88 (07-11-25 @ 11:12) (112/83 - 136/86)  RR: 18 (07-11-25 @ 11:12)  SpO2: 100% (07-11-25 @ 11:12) (97% - 100%)  Wt(kg): --    PHYSICAL EXAM:  Constitutional: non-toxic, no distress  HEAD/EYES: anicteric, no conjunctival injection  ENT:  supple, no thrush  Cardiovascular:   normal S1, S2, no murmur, no edema  Respiratory:  clear BS bilaterally, no wheezes, no rales  GI:  soft, non-tender, normal bowel sounds  :  no saxena, no CVA tenderness  Musculoskeletal:  LUE swelling without warmth, erythema or tenderness  Neurologic: awake and alert, normal strength, no focal findings  Skin:  no rash, no erythema, no phlebitis  Heme/Onc: no lymphadenopathy   Psychiatric:  appropriate mood      WBC  WBC Count: 3.16 K/uL (07-10 @ 08:20)  WBC Count: 4.18 K/uL (07-09 @ 08:47)    CBC                        11.7   3.16  )-----------( 160      ( 10 Jul 2025 08:20 )             38.1     BMP/CMP  07-11    139  |  106  |  8   ----------------------------<  82  3.5   |  27  |  0.85    Ca    8.8      11 Jul 2025 05:59  Phos  2.9     07-11  Mg     1.7     07-11    TPro  x   /  Alb  x   /  TBili  1.6[H]  /  DBili  x   /  AST  x   /  ALT  x   /  AlkPhos  x   07-11    Creatinine Trend  Creatinine Trend: 0.85<--, 1.05<--, 1.02<--, 1.22<--, 0.89<--, 0.88<--    Lipase: 48 U/L (07-09-25 @ 08:47)    MICROBIOLOGY:    RADIOLOGY:  < from: US Duplex Venous Upper Ext Ltd, Right (07.10.25 @ 12:13) >    ACC: [id number] EXAM:  US DPLX UPR EXT VEINS LTD RT   ORDERED BY: ARNALDO GRIGSBY     PROCEDURE DATE:  07/10/2025      INTERPRETATION:  CLINICAL INFORMATION: 60 years  Male with right upper   extremity swelling.    COMPARISON: 9/24/2024    TECHNIQUE: Duplex sonography of the RIGHT UPPER extremity veins with   color and spectral Doppler, with and without compression.    FINDINGS:    The right internal jugular, subclavian, axillary and brachial veins are   patent and compressible where applicable.  The basilic vein (superficial   vein) is patent and without thrombus.  The cephalic vein (superficial   vein) is patent and without thrombus.    Doppler examination shows normal spontaneous and phasic flow.    Visualized portions of the left internal jugular and subclavian veins are   patent.    IMPRESSION:  No evidence of right upper extremity deep venous thrombosis.    --- End of Report ---

## 2025-07-11 NOTE — CONSULT NOTE ADULT - SUBJECTIVE AND OBJECTIVE BOX
Patient is a 60y old  Male who presents with a chief complaint of alcohol withdrawal, alcoholic gastritis (11 Jul 2025 10:23)      HPI:  60 years old male with h/o alcohol abuse, liver cirrhosis present to ED with vomiting, epigastric pain and alcohol withdrawal. Patient reported nausea and vomiting for a few days, associated with upper abdominal pain. Patient has recurrent hospitalization for alcohol intoxication and alcohol withdrawal complicated by DT. Patient is not honest with alcohol intake. Initially stated that last drink was a few days ago but later stated that he had some drink last night to help him sleep after telling him labs with elevated serum alcohol level.   Hemodynamically stable, afebrile. No leukocytosis, plt 244, Cr 1.22, serum alcohol 237. EKG with NSR, LVH (09 Jul 2025 13:06)    Pt states that an IV was placed is the left arm that stopped working so another IV was placed in the left hand. The next day when he woke up the entire left arm was very swollen. States the left arm from the fingers to the elbow feels tight and uncomfortable. The hand feels warm to him. Reports sensation is unaffected, no numbness or tingling. He is unable to close his hand due to the swelling. A similar thing has happened before and he has asked staff to not place an IV in the left hand. Denies fever, chills, sob.       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 1000 milliLiter(s) (80 mL/Hr) IV Continuous <Continuous>  lactulose Syrup 20 Gram(s) Oral two times a day  magnesium sulfate  IVPB 1 Gram(s) IV Intermittent once  multivitamin 1 Tablet(s) Oral daily  pantoprazole  Injectable 40 milliGRAM(s) IV Push daily  rifAXIMin 550 milliGRAM(s) Oral two times a day  thiamine 100 milliGRAM(s) Oral daily  vancomycin  IVPB 1000 milliGRAM(s) IV Intermittent once    MEDICATIONS  (PRN):  acetaminophen     Tablet .. 650 milliGRAM(s) Oral every 6 hours PRN Mild Pain (1 - 3), Moderate Pain (4 - 6)  melatonin 3 milliGRAM(s) Oral at bedtime PRN Insomnia  morphine  - Injectable 2 milliGRAM(s) IV Push every 6 hours PRN Severe Pain (7 - 10)  ondansetron Injectable 4 milliGRAM(s) IV Push every 6 hours PRN Nausea and/or Vomiting  PHENobarbital Injectable 130 milliGRAM(s) IV Push every 4 hours PRN CIWA > 8 or higher      Allergies    No Known Allergies    Intolerances        SOCIAL HISTORY           FAMILY HISTORY:  FH: HTN (hypertension)        Vital Signs Last 24 Hrs  T(C): 36.8 (11 Jul 2025 11:12), Max: 36.9 (10 Jul 2025 22:54)  T(F): 98.3 (11 Jul 2025 11:12), Max: 98.4 (10 Jul 2025 22:54)  HR: 69 (11 Jul 2025 11:12) (55 - 69)  BP: 128/88 (11 Jul 2025 11:12) (112/83 - 136/86)  BP(mean): --  RR: 18 (11 Jul 2025 11:12) (18 - 18)  SpO2: 100% (11 Jul 2025 11:12) (97% - 100%)    Parameters below as of 11 Jul 2025 07:00  Patient On (Oxygen Delivery Method): room air        Physical Exam:  General:  no distress  Eyes: conjunctiva clear  Chest:  unlabored breathing  Cardiovascular:  Regular rate   Extremities: LUQ is swollen from fingers to elbow, mild erythema to dorsum of the left hand, warm to the touch, non tender to palpation, sensation is intcat, brachial and radial pulses present  Neuro/Psych:  Alert, oriented to time, place and person         LABS:                        11.7   3.16  )-----------( 160      ( 10 Jul 2025 08:20 )             38.1     07-11    139  |  106  |  8   ----------------------------<  82  3.5   |  27  |  0.85    Ca    8.8      11 Jul 2025 05:59  Phos  2.9     07-11  Mg     1.7     07-11    TPro  x   /  Alb  x   /  TBili  1.6[H]  /  DBili  x   /  AST  x   /  ALT  x   /  AlkPhos  x   07-11    PT/INR - ( 11 Jul 2025 05:59 )   PT: 14.1 sec;   INR: 1.22 ratio           Urinalysis Basic - ( 11 Jul 2025 05:59 )    Color: x / Appearance: x / SG: x / pH: x  Gluc: 82 mg/dL / Ketone: x  / Bili: x / Urobili: x   Blood: x / Protein: x / Nitrite: x   Leuk Esterase: x / RBC: x / WBC x   Sq Epi: x / Non Sq Epi: x / Bacteria: x

## 2025-07-11 NOTE — PROGRESS NOTE ADULT - SUBJECTIVE AND OBJECTIVE BOX
date of service: 07-11-25 @ 10:24  [facility]  REVIEW OF SYSTEMS:  CONSTITUTIONAL: No fever,  no  weight loss  ENT:  No  tinnitus,   no   vertigo  NECK: No pain or stiffness  RESPIRATORY: No cough, wheezing, chills or hemoptysis;    No Shortness of Breath  CARDIOVASCULAR: No chest pain, palpitations, dizziness  GASTROINTESTINAL: No abdominal or epigastric pain. No nausea, vomiting, or hematemesis; No diarrhea  No melena or hematochezia.  GENITOURINARY: No dysuria, frequency, hematuria, or incontinence  NEUROLOGICAL: No headaches  SKIN: No itching,  no   rash  LYMPH Nodes: No enlarged glands  ENDOCRINE: No heat or cold intolerance  MUSCULOSKELETAL: No joint pain or swelling  PSYCHIATRIC: No depression, anxiety  HEME/LYMPH: No easy bruising, or bleeding gums  ALLERGY AND IMMUNOLOGIC: No hives or eczema	    MEDICATIONS  (STANDING):  folic acid 1 milliGRAM(s) Oral daily  lactated ringers. 1000 milliLiter(s) (80 mL/Hr) IV Continuous <Continuous>  lactulose Syrup 20 Gram(s) Oral two times a day  multivitamin 1 Tablet(s) Oral daily  pantoprazole  Injectable 40 milliGRAM(s) IV Push daily  rifAXIMin 550 milliGRAM(s) Oral two times a day  thiamine 100 milliGRAM(s) Oral daily    MEDICATIONS  (PRN):  acetaminophen     Tablet .. 650 milliGRAM(s) Oral every 6 hours PRN Mild Pain (1 - 3), Moderate Pain (4 - 6)  melatonin 3 milliGRAM(s) Oral at bedtime PRN Insomnia  morphine  - Injectable 2 milliGRAM(s) IV Push every 6 hours PRN Severe Pain (7 - 10)  ondansetron Injectable 4 milliGRAM(s) IV Push every 6 hours PRN Nausea and/or Vomiting  PHENobarbital Injectable 130 milliGRAM(s) IV Push every 4 hours PRN CIWA > 8 or higher      Vital Signs Last 24 Hrs  T(C): 36.7 (11 Jul 2025 07:00), Max: 36.9 (10 Jul 2025 10:45)  T(F): 98.1 (11 Jul 2025 07:00), Max: 98.4 (10 Jul 2025 10:45)  HR: 61 (11 Jul 2025 07:00) (55 - 61)  BP: 136/86 (11 Jul 2025 07:00) (112/83 - 136/86)  BP(mean): --  RR: 18 (11 Jul 2025 07:00) (18 - 18)  SpO2: 99% (11 Jul 2025 07:00) (97% - 100%)    Parameters below as of 11 Jul 2025 07:00  Patient On (Oxygen Delivery Method): room air      CAPILLARY BLOOD GLUCOSE        I&O's Summary        Appearance: Normal	  HEENT:   Normal oral mucosa, PERRL, EOMI	  Lymphatic: No lymphadenopathy  Cardiovascular: Normal S1 S2, No JVD  Respiratory: Lungs clear to auscultation	  Gastrointestinal:  Soft, Non-tender, + BS	  Skin: No rash, No ecchymoses	  Extremities:     LABS:                        11.7   3.16  )-----------( 160      ( 10 Jul 2025 08:20 )             38.1     07-11    139  |  106  |  8   ----------------------------<  82  3.5   |  27  |  0.85    Ca    8.8      11 Jul 2025 05:59  Phos  2.9     07-11  Mg     1.7     07-11    TPro  x   /  Alb  x   /  TBili  1.6[H]  /  DBili  x   /  AST  x   /  ALT  x   /  AlkPhos  x   07-11    PT/INR - ( 11 Jul 2025 05:59 )   PT: 14.1 sec;   INR: 1.22 ratio               Urinalysis Basic - ( 11 Jul 2025 05:59 )    Color: x / Appearance: x / SG: x / pH: x  Gluc: 82 mg/dL / Ketone: x  / Bili: x / Urobili: x   Blood: x / Protein: x / Nitrite: x   Leuk Esterase: x / RBC: x / WBC x   Sq Epi: x / Non Sq Epi: x / Bacteria: x                      Consultant(s) Notes Reviewed:      Care Discussed with Consultants/Other Providers:       date of service: 07-11-25 @ 10:24  [facility]  REVIEW OF SYSTEMS:  CONSTITUTIONAL: No fever,  no  weight loss  ENT:  No  tinnitus,   no   vertigo  NECK: No pain or stiffness  RESPIRATORY: No cough, wheezing, chills or hemoptysis;    No Shortness of Breath  CARDIOVASCULAR: No chest pain, palpitations, dizziness  GASTROINTESTINAL: No abdominal or epigastric pain. No nausea, vomiting, or hematemesis; No diarrhea  No melena or hematochezia.  GENITOURINARY: No dysuria, frequency, hematuria, or incontinence  NEUROLOGICAL: No headaches  SKIN: No itching,  no   rash  LYMPH Nodes: No enlarged glands  ENDOCRINE: No heat or cold intolerance  MUSCULOSKELETAL: No joint pain or swelling  PSYCHIATRIC: No depression, anxiety  HEME/LYMPH: No easy bruising, or bleeding gums  ALLERGY AND IMMUNOLOGIC: No hives or eczema	    MEDICATIONS  (STANDING):  folic acid 1 milliGRAM(s) Oral daily  lactated ringers. 1000 milliLiter(s) (80 mL/Hr) IV Continuous <Continuous>  lactulose Syrup 20 Gram(s) Oral two times a day  multivitamin 1 Tablet(s) Oral daily  pantoprazole  Injectable 40 milliGRAM(s) IV Push daily  rifAXIMin 550 milliGRAM(s) Oral two times a day  thiamine 100 milliGRAM(s) Oral daily    MEDICATIONS  (PRN):  acetaminophen     Tablet .. 650 milliGRAM(s) Oral every 6 hours PRN Mild Pain (1 - 3), Moderate Pain (4 - 6)  melatonin 3 milliGRAM(s) Oral at bedtime PRN Insomnia  morphine  - Injectable 2 milliGRAM(s) IV Push every 6 hours PRN Severe Pain (7 - 10)  ondansetron Injectable 4 milliGRAM(s) IV Push every 6 hours PRN Nausea and/or Vomiting  PHENobarbital Injectable 130 milliGRAM(s) IV Push every 4 hours PRN CIWA > 8 or higher      Vital Signs Last 24 Hrs  T(C): 36.7 (11 Jul 2025 07:00), Max: 36.9 (10 Jul 2025 10:45)  T(F): 98.1 (11 Jul 2025 07:00), Max: 98.4 (10 Jul 2025 10:45)  HR: 61 (11 Jul 2025 07:00) (55 - 61)  BP: 136/86 (11 Jul 2025 07:00) (112/83 - 136/86)  BP(mean): --  RR: 18 (11 Jul 2025 07:00) (18 - 18)  SpO2: 99% (11 Jul 2025 07:00) (97% - 100%)    Parameters below as of 11 Jul 2025 07:00  Patient On (Oxygen Delivery Method): room air      CAPILLARY BLOOD GLUCOSE        I&O's Summary        Appearance: Normal	  HEENT:   Normal oral mucosa, PERRL, EOMI	  Lymphatic: No lymphadenopathy  Cardiovascular: Normal S1 S2, No JVD  Respiratory: Lungs clear to auscultation	  Gastrointestinal:  Soft, Non-tender, + BS	  Skin: No rash, No ecchymoses	  Extremities:   left  arm  elizabeth  hand.  fingers,  swollen.  mild  redness/  puls e +    LABS:                        11.7   3.16  )-----------( 160      ( 10 Jul 2025 08:20 )             38.1     07-11    139  |  106  |  8   ----------------------------<  82  3.5   |  27  |  0.85    Ca    8.8      11 Jul 2025 05:59  Phos  2.9     07-11  Mg     1.7     07-11    TPro  x   /  Alb  x   /  TBili  1.6[H]  /  DBili  x   /  AST  x   /  ALT  x   /  AlkPhos  x   07-11    PT/INR - ( 11 Jul 2025 05:59 )   PT: 14.1 sec;   INR: 1.22 ratio               Urinalysis Basic - ( 11 Jul 2025 05:59 )    Color: x / Appearance: x / SG: x / pH: x  Gluc: 82 mg/dL / Ketone: x  / Bili: x / Urobili: x   Blood: x / Protein: x / Nitrite: x   Leuk Esterase: x / RBC: x / WBC x   Sq Epi: x / Non Sq Epi: x / Bacteria: x                      Consultant(s) Notes Reviewed:      Care Discussed with Consultants/Other Providers:

## 2025-07-11 NOTE — BH CONSULTATION LIAISON PROGRESS NOTE - OTHER
returning to baseline  adequate  deferred  low end of fair  reflective of context  appropriate  has reactivity  continues to be limited into the extent of his alcohol addiction

## 2025-07-11 NOTE — CONSULT NOTE ADULT - ASSESSMENT
Impression:        Recommendations:          Discussed with primary team    June Sands MD  Attending Physician  Division of Infectious Diseases   Available via [employer] Teams   59 y/o man with h/o alcohol abuse, liver cirrhosis present to ED with vomiting, epigastric pain and alcohol withdrawal. Patient reported nausea and vomiting for a few days, associated with upper abdominal pain. Patient has recurrent hospitalization for alcohol intoxication and alcohol withdrawal complicated by DT.   Hemodynamically stable, afebrile. No leukocytosis.  Noted with LUE swelling x 1 day after an infiltrated peripheral IV line.    Impression:  LUE swelling - no signs of cellulitis  EtOH withdrawal    Recommendations:  -Stop IV antibiotics  -Remove peripheral IV from left arm  -Obtain US venous doppler of LUE to r/o clot  -Arm elevation  -Warm compresses for symptomatic relief    Discussed with primary team    June Sands MD  Attending Physician  Division of Infectious Diseases   Available via [employer] Teams

## 2025-07-11 NOTE — PROGRESS NOTE ADULT - ASSESSMENT
60 years old male     with h/o alcohol abuse, liver cirrhosis      present to ED with vomiting, epigastric pain and alcohol withdrawal. had   nausea and vomiting for a few days, .     recurrent hospitalization for alcohol intoxication.   withdrawal complicated by DT     serum alcohol 237.  ekg, nsr      ETOH  abuse /  intoxication .  Alcohol withdrawal syndrome.   serum alcohol 237   recurrent hospitalization for alcohol intoxication / withdrawal    on CIWA/  iv  fluids     pain control--> IV morphine prn for severe pain /  seen by  icu/  rejected  Liver cirrhosis, alcoholic.      on   rifaximin and lactulose  Alcohol cessation ,  was  advised    echo,   normal  ef    left  arm,    swelling, form iv infiltration  .  no  drainage/  and   doppler   no  dvt   pt   with some  agitation  on  ciwa   and    on,  phenobarb,  as  per gt ford    encounter  time  35  minutes       rad< from: CT Abdomen and Pelvis w/ IV Cont (06.03.25 @ 07:11) >  IMPRESSION:  1.  No acute abdominal/pelvic pathology.  2.  Cirrhotic liver and portal hypertension  3.  Etiology of pain not determined  --- End of Report ---      < 	  60 years old male     with h/o alcohol abuse, liver cirrhosis      present to ED with vomiting, epigastric pain and alcohol withdrawal. had   nausea and vomiting for a few days, .     recurrent hospitalization for alcohol intoxication.   withdrawal complicated by DT     serum alcohol 237.  ekg, nsr      ETOH  abuse /  intoxication .  Alcohol withdrawal syndrome.   serum alcohol 237.  was  rejected  by icu    recurrent hospitalization for alcohol intoxication / withdrawal      on CIWA/  iv  fluids       on   IV morphine prn    Liver cirrhosis, alcoholic.       on   rifaximin and lactulose  Alcohol cessation   advised    echo,   normal  ef    left   upper /  ante cubital arm,   swelling, form iv infiltration  apparently in  er, ,   and   doppler   no  dvt    today. has   swelling  of  hand/  fingers, pulse  felt.  and   requested  RN to  remove  iv  form this  arm, pt   has  iv  access  on other  arm    doppler  left arm/  hand     on  CIWA   and    on,  phenobarb,  as  per dr ford    encounter  time  35  minutes       rad< from: CT Abdomen and Pelvis w/ IV Cont (06.03.25 @ 07:11) >  IMPRESSION:  1.  No acute abdominal/pelvic pathology.  2.  Cirrhotic liver and portal hypertension  3.  Etiology of pain not determined  --- End of Report ---      < 	  60 years old male     with h/o alcohol abuse, liver cirrhosis      present to ED with vomiting, epigastric pain and alcohol withdrawal. had   nausea and vomiting for a few days, .     recurrent hospitalization for alcohol intoxication.   withdrawal complicated by DT     serum alcohol 237.  ekg, nsr      ETOH  abuse /  intoxication .  Alcohol withdrawal syndrome.   serum alcohol 237.  was  rejected  by icu    recurrent hospitalization for alcohol intoxication / withdrawal      on CIWA/  iv  fluids       on   IV morphine prn    Liver cirrhosis, alcoholic.       on   rifaximin and lactulose  Alcohol cessation   advised    echo,   normal  ef    left   upper /  ante cubital arm,   swelling, form iv infiltration  apparently in  er, ,   and   doppler   no  dvt    today. has   swelling  of  hand/  fingers, pulse  felt.  and   requested  RN to  remove  iv  form this  arm, pt   has  iv  access  on other  arm     doppler  left    hand/  vasc  eval    called  /  Gaurav      on  CIWA   and    on,  phenobarb,  as  per dr ford    encounter  time  35  minutes       rad< from: CT Abdomen and Pelvis w/ IV Cont (06.03.25 @ 07:11) >  IMPRESSION:  1.  No acute abdominal/pelvic pathology.  2.  Cirrhotic liver and portal hypertension  3.  Etiology of pain not determined  --- End of Report ---      < 	  60 years old male     with h/o alcohol abuse, liver cirrhosis      present to ED with vomiting, epigastric pain and alcohol withdrawal. had   nausea and vomiting for a few days, .     recurrent hospitalization for alcohol intoxication.   withdrawal complicated by DT     serum alcohol 237.  ekg, nsr      ETOH  abuse /  intoxication .  Alcohol withdrawal syndrome.   serum alcohol 237.  was  rejected  by icu    recurrent hospitalization for alcohol intoxication / withdrawal      on CIWA/  iv  fluids       on   IV morphine prn    Liver cirrhosis, alcoholic.       on   rifaximin and lactulose  Alcohol cessation   advised    echo,   normal  ef    left   upper /  ante cubital arm,   swelling, form iv infiltration  apparently in  er, ,   and   doppler   no  dvt    today. has   swelling  of  hand/  fingers, pulse  felt.  and   requested  RN to  remove  iv  form this  arm, pt   already  has  iv  access  on other  arm     no  iv/  blood draws  on left  arm    doppler  left    hand/  vasc  eval    called  /  Gaurav  /  ID  for  ?  ab, given  redness  of left  arm today    on  CIWA   and    on,  phenobarb,  as  per dr ford    encounter  time  35  minutes       rad< from: CT Abdomen and Pelvis w/ IV Cont (06.03.25 @ 07:11) >  IMPRESSION:  1.  No acute abdominal/pelvic pathology.  2.  Cirrhotic liver and portal hypertension  3.  Etiology of pain not determined  --- End of Report ---      < 	  60 years old male     with h/o alcohol abuse, liver cirrhosis      present to ED with vomiting, epigastric pain and alcohol withdrawal. had   nausea and vomiting for a few days, .     recurrent hospitalization for alcohol intoxication.   withdrawal complicated by DT     serum alcohol 237.  ekg, nsr      ETOH  abuse /  intoxication .  Alcohol withdrawal syndrome.   serum alcohol 237.  was  rejected  by icu    recurrent hospitalization for alcohol intoxication / withdrawal      on CIWA/  iv  fluids       on   IV morphine prn    Liver cirrhosis, alcoholic.       on   rifaximin and lactulose  Alcohol cessation   advised    echo,   normal  ef     had  left   upper /  ante cubital arm,   swelling, from iv infiltration  apparently in  er,.  minimal  redness,  ,and   doppler   no  dvt    today. has   swelling /  pronounced  erythema  of  hand/  fingers, pulse  felt.  and   requested  RN to  remove  iv  form this  arm, pt   already  has  iv  access  on other  arm     no  iv/  blood draws  on left  arm    doppler  left    hand/  vasc  eval    called  /  Gaurav  /  ID  for    choice  of   ab, given  redness  of left  arm today     cellulitis, arm, on iv ancef/  iv  vanco    on  CIWA   and    on,  phenobarb,  as  per dr ford    encounter  time  35  minutes       rad< from: CT Abdomen and Pelvis w/ IV Cont (06.03.25 @ 07:11) >  IMPRESSION:  1.  No acute abdominal/pelvic pathology.  2.  Cirrhotic liver and portal hypertension  3.  Etiology of pain not determined  --- End of Report ---      <

## 2025-07-11 NOTE — CONSULT NOTE ADULT - ASSESSMENT
Patient is a 60y old  Male who presents with a chief complaint of alcohol withdrawal, alcoholic gastritis. Pt had an IV infiltrate in the LUE then subsequent IV placed downstream in the left hand. Now with substantial swelling of LUE from fingertips to elbow.     Plan:  Please obtain duplex of LEFT upper extremity  Heat packs  Elevation  Remove IV in LUE, discussed w/ RN  Awaiting call back from attending      NOTE IS INCOMPLETE PENDING ATTENDING EVALUATION     Patient is a 60y old  Male who presents with a chief complaint of alcohol withdrawal, alcoholic gastritis. Pt had an IV infiltrate in the LUE then subsequent IV placed downstream in the left hand. Now with substantial swelling of LUE from fingertips to elbow.     Plan:  Please obtain duplex of LEFT upper extremity  Heat packs  Elevation  Remove IV in LUE, discussed w/ RN  Place ace bandage for compression on LUE  Discussed with attending

## 2025-07-11 NOTE — CONSULT NOTE ADULT - ATTENDING COMMENTS
He had an IV cannula inserted in his left arm.  He subsequently developed pain and swelling in the arm.  There was moderate swelling.  His compartments are soft.  There was minimal erythema.  There was no purulence from because of the site.  A duplex scan noted phlebitis in the basilic and cephalic veins.  There was no DVT.  His arm should be elevated and wrapped with an Ace bandage.  Warm compresses should be used in any tender areas.  He does not require anticoagulation for the phlebitis.  If his arm swelling and pain do not improve then a repeat venous scan should be done to look for extension of the phlebitis or a DVT.

## 2025-07-12 PROCEDURE — 99232 SBSQ HOSP IP/OBS MODERATE 35: CPT

## 2025-07-12 PROCEDURE — 36410 VNPNXR 3YR/> PHY/QHP DX/THER: CPT

## 2025-07-12 PROCEDURE — 76937 US GUIDE VASCULAR ACCESS: CPT | Mod: 26

## 2025-07-12 RX ORDER — ACETAMINOPHEN 500 MG/5ML
1000 LIQUID (ML) ORAL ONCE
Refills: 0 | Status: COMPLETED | OUTPATIENT
Start: 2025-07-12 | End: 2025-07-12

## 2025-07-12 RX ORDER — PHENOBARBITAL 30 MG/1
130 TABLET ORAL ONCE
Refills: 0 | Status: DISCONTINUED | OUTPATIENT
Start: 2025-07-12 | End: 2025-07-12

## 2025-07-12 RX ORDER — ACETAMINOPHEN 500 MG/5ML
1000 LIQUID (ML) ORAL ONCE
Refills: 0 | Status: DISCONTINUED | OUTPATIENT
Start: 2025-07-12 | End: 2025-07-12

## 2025-07-12 RX ORDER — CEFAZOLIN SODIUM IN 0.9 % NACL 3 G/100 ML
1000 INTRAVENOUS SOLUTION, PIGGYBACK (ML) INTRAVENOUS EVERY 8 HOURS
Refills: 0 | Status: DISCONTINUED | OUTPATIENT
Start: 2025-07-12 | End: 2025-07-14

## 2025-07-12 RX ORDER — KETOROLAC TROMETHAMINE 30 MG/ML
15 INJECTION, SOLUTION INTRAMUSCULAR; INTRAVENOUS ONCE
Refills: 0 | Status: DISCONTINUED | OUTPATIENT
Start: 2025-07-12 | End: 2025-07-12

## 2025-07-12 RX ADMIN — Medication 1 TABLET(S): at 12:28

## 2025-07-12 RX ADMIN — Medication 100 MILLIGRAM(S): at 16:20

## 2025-07-12 RX ADMIN — Medication 400 MILLIGRAM(S): at 05:55

## 2025-07-12 RX ADMIN — FOLIC ACID 1 MILLIGRAM(S): 1 TABLET ORAL at 12:28

## 2025-07-12 RX ADMIN — LACTULOSE 20 GRAM(S): 10 SOLUTION ORAL at 05:56

## 2025-07-12 RX ADMIN — KETOROLAC TROMETHAMINE 15 MILLIGRAM(S): 30 INJECTION, SOLUTION INTRAMUSCULAR; INTRAVENOUS at 17:21

## 2025-07-12 RX ADMIN — PHENOBARBITAL 129.6 MILLIGRAM(S): 30 TABLET ORAL at 18:39

## 2025-07-12 RX ADMIN — Medication 100 MILLIGRAM(S): at 12:28

## 2025-07-12 RX ADMIN — KETOROLAC TROMETHAMINE 15 MILLIGRAM(S): 30 INJECTION, SOLUTION INTRAMUSCULAR; INTRAVENOUS at 08:57

## 2025-07-12 RX ADMIN — PHENOBARBITAL 130 MILLIGRAM(S): 30 TABLET ORAL at 00:20

## 2025-07-12 RX ADMIN — Medication 1000 MILLIGRAM(S): at 06:55

## 2025-07-12 RX ADMIN — LACTULOSE 20 GRAM(S): 10 SOLUTION ORAL at 18:39

## 2025-07-12 RX ADMIN — SODIUM CHLORIDE 80 MILLILITER(S): 9 INJECTION, SOLUTION INTRAVENOUS at 00:27

## 2025-07-12 RX ADMIN — KETOROLAC TROMETHAMINE 15 MILLIGRAM(S): 30 INJECTION, SOLUTION INTRAMUSCULAR; INTRAVENOUS at 09:57

## 2025-07-12 RX ADMIN — Medication 3 MILLIGRAM(S): at 23:50

## 2025-07-12 RX ADMIN — Medication 650 MILLIGRAM(S): at 23:50

## 2025-07-12 RX ADMIN — KETOROLAC TROMETHAMINE 15 MILLIGRAM(S): 30 INJECTION, SOLUTION INTRAMUSCULAR; INTRAVENOUS at 16:21

## 2025-07-12 RX ADMIN — PHENOBARBITAL 130 MILLIGRAM(S): 30 TABLET ORAL at 23:49

## 2025-07-12 RX ADMIN — Medication 40 MILLIGRAM(S): at 12:28

## 2025-07-12 RX ADMIN — PHENOBARBITAL 129.6 MILLIGRAM(S): 30 TABLET ORAL at 05:55

## 2025-07-12 RX ADMIN — PHENOBARBITAL 130 MILLIGRAM(S): 30 TABLET ORAL at 16:15

## 2025-07-12 NOTE — PROCEDURE NOTE - ADDITIONAL PROCEDURE DETAILS
US guided PIV
Compressible vessel confirmed under US, needle access visualized within lumen on short axis. Wire visualized in vessel on long axis, catheter inserted smoothly with brisk blood return
US guided PIV

## 2025-07-12 NOTE — PROCEDURE NOTE - NSPROCDETAILS_GEN_ALL_CORE
US guided/dressing applied/flushes easily/secured in place
US guided/dressing applied/flushes easily/secured in place
flushes easily/secured in place/sterile technique, catheter placed
ultrasound guidance

## 2025-07-12 NOTE — PROGRESS NOTE ADULT - ASSESSMENT
60 years old male     with h/o alcohol abuse, liver cirrhosis      present to ED with vomiting, epigastric pain and alcohol withdrawal. had   nausea and vomiting for a few days, .     recurrent hospitalization for alcohol intoxication.   withdrawal complicated by DT     serum alcohol 237.  ekg, nsr      ETOH  abuse /  intoxication .  Alcohol withdrawal syndrome.   serum alcohol 237.  was  rejected  by icu    recurrent hospitalization for alcohol intoxication / withdrawal      on CIWA/  iv  fluids       on   IV morphine prn    Liver cirrhosis, alcoholic.       on   rifaximin and lactulose  Alcohol cessation   advised    echo,   normal  ef     had  left   upper /  ante cubital arm,   swelling, from iv infiltration  apparently in  er,.  minimal  redness,  ,and   doppler   no  dvt    today. has   swelling /  pronounced  erythema  of  hand/  fingers, pulse  felt.  and   requested  RN to  remove  iv  form this  arm, pt   already  has  iv  access  on other  arm     no  iv/  blood draws  on left  arm    doppler  left    hand/  vasc  eval    called  /  Gaurav  /  ID  for    choice  of   ab, given  redness  of left  arm today     cellulitis, arm,   and  ab was  stopped  by ID    on  CIWA   and    on,  phenobarb,  as  per dr ford    encounter  time  35  minutes       rad< from: CT Abdomen and Pelvis w/ IV Cont (06.03.25 @ 07:11) >  IMPRESSION:  1.  No acute abdominal/pelvic pathology.  2.  Cirrhotic liver and portal hypertension  3.  Etiology of pain not determined  --- End of Report ---      < 	  60 years old male     with h/o alcohol abuse, liver cirrhosis      present to ED with vomiting, epigastric pain and alcohol withdrawal. had   nausea and vomiting for a few days, .     recurrent hospitalization for alcohol intoxication.   withdrawal complicated by DT     serum alcohol 237.  ekg, nsr      ETOH  abuse /  intoxication .  Alcohol withdrawal syndrome.   serum alcohol 237.  was  rejected  by icu    recurrent hospitalization for alcohol intoxication / withdrawal      on CIWA/  iv  fluids       on   IV morphine prn    Liver cirrhosis, alcoholic.       on   rifaximin and lactulose  Alcohol cessation   advised    echo,   normal  ef     had  left   upper /  ante cubital arm,   swelling, from iv infiltration  apparently in  er,.  minimal  redness,  ,and   doppler   no  dvt    today. has   swelling /  pronounced  erythema  of  hand/  fingers, pulse  felt.  and   requested  RN to  remove  iv  form this  arm, pt   already  has  iv  access  on other  arm     no  iv/  blood draws  on left  arm    doppler  left    hand/  vasc  eval    called  /  Gaurav  /  ID  for    choice  of   ab, given  redness  of left  arm today     cellulitis, arm,   and  ab was  stopped  by ID    redness  has lessened,  will  give iv ancef/  bcx.  negative    on  CIWA   and    on,  phenobarb,  as  per dr ford    encounter  time  35  minutes       rad< from: CT Abdomen and Pelvis w/ IV Cont (06.03.25 @ 07:11) >  IMPRESSION:  1.  No acute abdominal/pelvic pathology.  2.  Cirrhotic liver and portal hypertension  3.  Etiology of pain not determined  --- End of Report ---      < 	  60 years old male     with h/o alcohol abuse, liver cirrhosis      present to ED with vomiting, epigastric pain and alcohol withdrawal. had   nausea and vomiting for a few days, .     recurrent hospitalization for alcohol intoxication.   withdrawal complicated by DT     serum alcohol 237.  ekg, nsr      ETOH  abuse /  intoxication .  Alcohol withdrawal syndrome.   serum alcohol 237.  was  rejected  by icu    recurrent hospitalization for alcohol intoxication / withdrawal      on CIWA/  iv  fluids       on   IV morphine prn    Liver cirrhosis, alcoholic.       on   rifaximin and lactulose  Alcohol cessation   advised    echo,   normal  ef     had  left   upper /  ante cubital arm,   swelling, from iv infiltration  apparently in  er,.  minimal  redness,  ,and   doppler   no  dvt    today. has   swelling /  pronounced  erythema  of  hand/  fingers, pulse  felt.  and   requested  RN to  remove  iv  form this  arm, pt   already  has  iv  access  on other  arm     no  iv/  blood draws  on left  arm    doppler  left    hand/  vasc  eval    called  /  Gaurav  /  ID  for    choice  of   ab, given  redness  of left  arm today     cellulitis, arm,   and  ab was  stopped  by ID    redness  has lessened,  will  give iv ancef,   given thrombophlebitis .   bcx.  negative     on  CIWA   and    on,  phenobarb,  as  per dr ford    encounter  time  35  minutes       rad< from: CT Abdomen and Pelvis w/ IV Cont (06.03.25 @ 07:11) >  IMPRESSION:  1.  No acute abdominal/pelvic pathology.  2.  Cirrhotic liver and portal hypertension  3.  Etiology of pain not determined  --- End of Report ---      <

## 2025-07-12 NOTE — PROGRESS NOTE ADULT - SUBJECTIVE AND OBJECTIVE BOX
date of service: 07-12-25 @ 09:56  [facility]  REVIEW OF SYSTEMS:  CONSTITUTIONAL: No fever,  no  weight loss  ENT:  No  tinnitus,   no   vertigo  NECK: No pain or stiffness  RESPIRATORY: No cough, wheezing, chills or hemoptysis;    No Shortness of Breath  CARDIOVASCULAR: No chest pain, palpitations, dizziness  GASTROINTESTINAL: No abdominal or epigastric pain. No nausea, vomiting, or hematemesis; No diarrhea  No melena or hematochezia.  GENITOURINARY: No dysuria, frequency, hematuria, or incontinence  NEUROLOGICAL: No headaches  SKIN: No itching,  no   rash  LYMPH Nodes: No enlarged glands  ENDOCRINE: No heat or cold intolerance  MUSCULOSKELETAL: No joint pain or swelling  PSYCHIATRIC: No depression, anxiety  HEME/LYMPH: No easy bruising, or bleeding gums  ALLERGY AND IMMUNOLOGIC: No hives or eczema	    MEDICATIONS  (STANDING):  folic acid 1 milliGRAM(s) Oral daily  lactated ringers. 1000 milliLiter(s) (80 mL/Hr) IV Continuous <Continuous>  lactulose Syrup 20 Gram(s) Oral two times a day  multivitamin 1 Tablet(s) Oral daily  pantoprazole  Injectable 40 milliGRAM(s) IV Push daily  PHENobarbital 129.6 milliGRAM(s) Oral two times a day  rifAXIMin 550 milliGRAM(s) Oral two times a day  thiamine 100 milliGRAM(s) Oral daily    MEDICATIONS  (PRN):  acetaminophen     Tablet .. 650 milliGRAM(s) Oral every 6 hours PRN Mild Pain (1 - 3), Moderate Pain (4 - 6)  melatonin 3 milliGRAM(s) Oral at bedtime PRN Insomnia  ondansetron Injectable 4 milliGRAM(s) IV Push every 6 hours PRN Nausea and/or Vomiting  PHENobarbital Injectable 130 milliGRAM(s) IV Push every 4 hours PRN CIWA > 8 or higher      Vital Signs Last 24 Hrs  T(C): 36.6 (12 Jul 2025 04:30), Max: 36.9 (11 Jul 2025 23:00)  T(F): 97.9 (12 Jul 2025 04:30), Max: 98.4 (11 Jul 2025 23:00)  HR: 66 (12 Jul 2025 04:30) (66 - 77)  BP: 126/77 (12 Jul 2025 04:30) (126/77 - 142/84)  BP(mean): --  RR: 18 (12 Jul 2025 04:30) (18 - 20)  SpO2: 97% (12 Jul 2025 04:30) (97% - 100%)    Parameters below as of 12 Jul 2025 04:30  Patient On (Oxygen Delivery Method): room air      CAPILLARY BLOOD GLUCOSE        I&O's Summary    11 Jul 2025 07:01  -  12 Jul 2025 07:00  --------------------------------------------------------  IN: 1060 mL / OUT: 0 mL / NET: 1060 mL          Appearance: Normal	  HEENT:   Normal oral mucosa, PERRL, EOMI	  Lymphatic: No lymphadenopathy  Cardiovascular: Normal S1 S2, No JVD  Respiratory: Lungs clear to auscultation	  Gastrointestinal:  Soft, Non-tender, + BS	  Skin: No rash, No ecchymoses	  Extremities:     LABS:    07-11    139  |  106  |  8   ----------------------------<  82  3.5   |  27  |  0.85    Ca    8.8      11 Jul 2025 05:59  Phos  2.9     07-11  Mg     1.7     07-11    TPro  x   /  Alb  x   /  TBili  1.6[H]  /  DBili  x   /  AST  x   /  ALT  x   /  AlkPhos  x   07-11    PT/INR - ( 11 Jul 2025 05:59 )   PT: 14.1 sec;   INR: 1.22 ratio               Urinalysis Basic - ( 11 Jul 2025 05:59 )    Color: x / Appearance: x / SG: x / pH: x  Gluc: 82 mg/dL / Ketone: x  / Bili: x / Urobili: x   Blood: x / Protein: x / Nitrite: x   Leuk Esterase: x / RBC: x / WBC x   Sq Epi: x / Non Sq Epi: x / Bacteria: x                      Consultant(s) Notes Reviewed:      Care Discussed with Consultants/Other Providers:

## 2025-07-13 PROCEDURE — 99232 SBSQ HOSP IP/OBS MODERATE 35: CPT

## 2025-07-13 RX ORDER — KETOROLAC TROMETHAMINE 30 MG/ML
15 INJECTION, SOLUTION INTRAMUSCULAR; INTRAVENOUS ONCE
Refills: 0 | Status: DISCONTINUED | OUTPATIENT
Start: 2025-07-13 | End: 2025-07-13

## 2025-07-13 RX ADMIN — Medication 650 MILLIGRAM(S): at 00:24

## 2025-07-13 RX ADMIN — Medication 3 MILLIGRAM(S): at 22:29

## 2025-07-13 RX ADMIN — PHENOBARBITAL 64.8 MILLIGRAM(S): 30 TABLET ORAL at 06:59

## 2025-07-13 RX ADMIN — PHENOBARBITAL 130 MILLIGRAM(S): 30 TABLET ORAL at 22:29

## 2025-07-13 RX ADMIN — Medication 100 MILLIGRAM(S): at 11:58

## 2025-07-13 RX ADMIN — Medication 40 MILLIGRAM(S): at 11:58

## 2025-07-13 RX ADMIN — SODIUM CHLORIDE 80 MILLILITER(S): 9 INJECTION, SOLUTION INTRAVENOUS at 11:59

## 2025-07-13 RX ADMIN — LACTULOSE 20 GRAM(S): 10 SOLUTION ORAL at 17:15

## 2025-07-13 RX ADMIN — FOLIC ACID 1 MILLIGRAM(S): 1 TABLET ORAL at 11:58

## 2025-07-13 RX ADMIN — PHENOBARBITAL 64.8 MILLIGRAM(S): 30 TABLET ORAL at 17:15

## 2025-07-13 RX ADMIN — Medication 1 TABLET(S): at 11:58

## 2025-07-13 RX ADMIN — Medication 100 MILLIGRAM(S): at 00:31

## 2025-07-13 RX ADMIN — Medication 100 MILLIGRAM(S): at 22:41

## 2025-07-13 RX ADMIN — KETOROLAC TROMETHAMINE 15 MILLIGRAM(S): 30 INJECTION, SOLUTION INTRAMUSCULAR; INTRAVENOUS at 01:10

## 2025-07-13 RX ADMIN — KETOROLAC TROMETHAMINE 15 MILLIGRAM(S): 30 INJECTION, SOLUTION INTRAMUSCULAR; INTRAVENOUS at 01:40

## 2025-07-13 RX ADMIN — Medication 100 MILLIGRAM(S): at 07:05

## 2025-07-13 RX ADMIN — Medication 100 MILLIGRAM(S): at 14:58

## 2025-07-13 RX ADMIN — LACTULOSE 20 GRAM(S): 10 SOLUTION ORAL at 06:59

## 2025-07-13 NOTE — PROGRESS NOTE ADULT - SUBJECTIVE AND OBJECTIVE BOX
date of service: 07-13-25 @ 10:08  [facility]  REVIEW OF SYSTEMS:  CONSTITUTIONAL: No fever,  no  weight loss  ENT:  No  tinnitus,   no   vertigo  NECK: No pain or stiffness  RESPIRATORY: No cough, wheezing, chills or hemoptysis;    No Shortness of Breath  CARDIOVASCULAR: No chest pain, palpitations, dizziness  GASTROINTESTINAL: No abdominal or epigastric pain. No nausea, vomiting, or hematemesis; No diarrhea  No melena or hematochezia.  GENITOURINARY: No dysuria, frequency, hematuria, or incontinence  NEUROLOGICAL: No headaches  SKIN: No itching,  no   rash  LYMPH Nodes: No enlarged glands  ENDOCRINE: No heat or cold intolerance  MUSCULOSKELETAL: No joint pain or swelling  PSYCHIATRIC: No depression, anxiety  HEME/LYMPH: No easy bruising, or bleeding gums  ALLERGY AND IMMUNOLOGIC: No hives or eczema	    MEDICATIONS  (STANDING):  ceFAZolin   IVPB 1000 milliGRAM(s) IV Intermittent every 8 hours  folic acid 1 milliGRAM(s) Oral daily  lactated ringers. 1000 milliLiter(s) (80 mL/Hr) IV Continuous <Continuous>  lactulose Syrup 20 Gram(s) Oral two times a day  multivitamin 1 Tablet(s) Oral daily  pantoprazole  Injectable 40 milliGRAM(s) IV Push daily  PHENobarbital 64.8 milliGRAM(s) Oral two times a day  rifAXIMin 550 milliGRAM(s) Oral two times a day  thiamine 100 milliGRAM(s) Oral daily    MEDICATIONS  (PRN):  acetaminophen     Tablet .. 650 milliGRAM(s) Oral every 6 hours PRN Mild Pain (1 - 3), Moderate Pain (4 - 6)  melatonin 3 milliGRAM(s) Oral at bedtime PRN Insomnia  ondansetron Injectable 4 milliGRAM(s) IV Push every 6 hours PRN Nausea and/or Vomiting  PHENobarbital Injectable 130 milliGRAM(s) IV Push every 4 hours PRN CIWA > 8 or higher      Vital Signs Last 24 Hrs  T(C): 36.9 (13 Jul 2025 08:58), Max: 36.9 (13 Jul 2025 08:58)  T(F): 98.5 (13 Jul 2025 08:58), Max: 98.5 (13 Jul 2025 08:58)  HR: 56 (13 Jul 2025 07:04) (50 - 70)  BP: 153/74 (13 Jul 2025 05:02) (118/64 - 156/92)  BP(mean): --  RR: 18 (13 Jul 2025 07:04) (18 - 18)  SpO2: 98% (13 Jul 2025 07:04) (96% - 98%)    Parameters below as of 13 Jul 2025 07:04  Patient On (Oxygen Delivery Method): room air      CAPILLARY BLOOD GLUCOSE        I&O's Summary    12 Jul 2025 07:01  -  13 Jul 2025 07:00  --------------------------------------------------------  IN: 2120 mL / OUT: 400 mL / NET: 1720 mL          Appearance: Normal	  HEENT:   Normal oral mucosa, PERRL, EOMI	  Lymphatic: No lymphadenopathy  Cardiovascular: Normal S1 S2, No JVD  Respiratory: Lungs clear to auscultation	  Gastrointestinal:  Soft, Non-tender, + BS	  Skin: No rash, No ecchymoses	  Extremities:     LABS:                                  Consultant(s) Notes Reviewed:      Care Discussed with Consultants/Other Providers:

## 2025-07-13 NOTE — PATIENT PROFILE ADULT - HAVE YOU EXPERIENCED VIOLENCE OR A TRAUMATIC EVENT?
Pt feeling contractions, tolerating  VSS  NST cat I  [place] q 3 min  VE 7/90/-1  -Continue pitocin   no

## 2025-07-13 NOTE — PROGRESS NOTE ADULT - ASSESSMENT
60 years old male     with h/o alcohol abuse, liver cirrhosis      present to ED with vomiting, epigastric pain and alcohol withdrawal. had   nausea and vomiting for a few days, .     recurrent hospitalization for alcohol intoxication.   withdrawal complicated by DT     serum alcohol 237.  ekg, nsr      ETOH  abuse /  intoxication .  Alcohol withdrawal syndrome.   serum alcohol 237.  was  rejected  by icu    recurrent hospitalization for alcohol intoxication / withdrawal      on CIWA/  iv  fluids     on   IV morphine prn    Liver cirrhosis, alcoholic.       on   rifaximin and lactulose  Alcohol cessation   advised    echo,   normal  ef     had  left   upper /  ante cubital arm,   swelling, from iv infiltration  apparently in  er,.  minimal  redness,  ,and   doppler   no  dvt    today. has   swelling /  pronounced  erythema  of  hand/  fingers, pulse  felt.  and   requested  RN to  remove  iv  form this  arm, pt   already  has  iv  access  on other  arm     no  iv/  blood draws  on left  arm    doppler  left    hand/  vasc  eval    called  /  Gaurav  /  ID  for    choice  of   ab, given  redness  of left  arm today     cellulitis, arm,   and  ab was  stopped  by ID    redness  has lessened,  will  give iv ancef,   given thrombophlebitis ,  basilic  and cepahil  V, .   bcx.  negative    MIDLINE , RIGHT ARM    on  CIWA   and    on,  phenobarb,  as  per dr ford    encounter  time  35  minutes       rad< from: CT Abdomen and Pelvis w/ IV Cont (06.03.25 @ 07:11) >  IMPRESSION:  1.  No acute abdominal/pelvic pathology.  2.  Cirrhotic liver and portal hypertension  3.  Etiology of pain not determined  --- End of Report ---      <

## 2025-07-14 ENCOUNTER — TRANSCRIPTION ENCOUNTER (OUTPATIENT)
Age: 60
End: 2025-07-14

## 2025-07-14 VITALS
HEART RATE: 60 BPM | TEMPERATURE: 98 F | DIASTOLIC BLOOD PRESSURE: 83 MMHG | OXYGEN SATURATION: 100 % | RESPIRATION RATE: 17 BRPM | SYSTOLIC BLOOD PRESSURE: 144 MMHG

## 2025-07-14 PROCEDURE — 99239 HOSP IP/OBS DSCHRG MGMT >30: CPT

## 2025-07-14 PROCEDURE — 99232 SBSQ HOSP IP/OBS MODERATE 35: CPT

## 2025-07-14 PROCEDURE — 99231 SBSQ HOSP IP/OBS SF/LOW 25: CPT

## 2025-07-14 PROCEDURE — G0545: CPT

## 2025-07-14 RX ADMIN — FOLIC ACID 1 MILLIGRAM(S): 1 TABLET ORAL at 13:05

## 2025-07-14 RX ADMIN — Medication 40 MILLIGRAM(S): at 13:05

## 2025-07-14 RX ADMIN — Medication 100 MILLIGRAM(S): at 13:05

## 2025-07-14 RX ADMIN — Medication 650 MILLIGRAM(S): at 06:11

## 2025-07-14 RX ADMIN — SODIUM CHLORIDE 80 MILLILITER(S): 9 INJECTION, SOLUTION INTRAVENOUS at 03:41

## 2025-07-14 RX ADMIN — Medication 650 MILLIGRAM(S): at 03:37

## 2025-07-14 RX ADMIN — Medication 100 MILLIGRAM(S): at 06:16

## 2025-07-14 RX ADMIN — PHENOBARBITAL 130 MILLIGRAM(S): 30 TABLET ORAL at 03:36

## 2025-07-14 RX ADMIN — LACTULOSE 20 GRAM(S): 10 SOLUTION ORAL at 06:15

## 2025-07-14 RX ADMIN — Medication 1 TABLET(S): at 13:05

## 2025-07-14 RX ADMIN — PHENOBARBITAL 32.4 MILLIGRAM(S): 30 TABLET ORAL at 06:15

## 2025-07-14 NOTE — PROGRESS NOTE ADULT - ASSESSMENT
60 years old male     with h/o alcohol abuse, liver cirrhosis      present to ED with vomiting, epigastric pain and alcohol withdrawal. had   nausea and vomiting for a few days, .     recurrent hospitalization for alcohol intoxication.   withdrawal complicated by DT     serum alcohol 237.  ekg, nsr      ETOH  abuse /  intoxication .  Alcohol withdrawal syndrome.   serum alcohol 237.  was  rejected  by icu    recurrent hospitalization for alcohol intoxication / withdrawal      on CIWA/  iv  fluids     on   IV morphine prn    Liver cirrhosis, alcoholic.       on   rifaximin and lactulose  Alcohol cessation   advised    echo,   normal  ef     had  left   upper /  ante cubital arm,   swelling, from iv infiltration  apparently in  er,.  minimal  redness,  ,and   doppler   no  dvt    today. has   swelling /  pronounced  erythema  of  hand/  fingers, pulse  felt.  and   requested  RN to  remove  iv  form this  arm, pt   already  has  iv  access  on other  arm     no  iv/  blood draws  on left  arm    doppler  left    hand/  vasc  eval    called  /  Gaurav  /  ID  for    choice  of   ab, given  redness  of left  arm today     cellulitis, arm,   and  ab was  stopped  by ID    redness  has lessened,    thrombophlebitis ,  basilic  and cephalic V,    bcx.  negative      midline   placed  by PA    on  CIWA   and    on,  phenobarb,  taper,   as  per dr ford    doing  better    encounter  time  35  minutes       rad< from: CT Abdomen and Pelvis w/ IV Cont (06.03.25 @ 07:11) >  IMPRESSION:  1.  No acute abdominal/pelvic pathology.  2.  Cirrhotic liver and portal hypertension  3.  Etiology of pain not determined  --- End of Report ---      < 	  60 years old male     with h/o alcohol abuse, liver cirrhosis      present to ED with vomiting, epigastric pain and alcohol withdrawal. had   nausea and vomiting for a few days, .     recurrent hospitalization for alcohol intoxication.   withdrawal complicated by DT     serum alcohol 237.  ekg, nsr      ETOH  abuse /  intoxication .  Alcohol withdrawal syndrome.   serum alcohol 237.  was  rejected  by icu    recurrent hospitalization for alcohol intoxication / withdrawal      on CIWA/  iv  fluids     on   IV morphine prn    Liver cirrhosis, alcoholic.       on   rifaximin and lactulose  Alcohol cessation   advised    echo,   normal  ef     had  left   upper /  ante cubital arm,   swelling, from iv infiltration  apparently in  er,.  minimal  redness,  ,and   doppler   no  dvt    today. has   swelling /  pronounced  erythema  of  hand/  fingers, pulse  felt.  and   requested  RN to  remove  iv  form this  arm, pt   already  has  iv  access  on other  arm     no  iv/  blood draws  on left  arm    doppler  left    hand/  vasc  eval    called  /  Gaurav  /  ID  for    choice  of   ab, given  redness  of left  arm today     cellulitis, arm,   and  ab was  stopped  by ID    redness  has lessened,    thrombophlebitis ,  basilic  and cephalic V,    bcx.  negative      midline   placed  by PA    on  CIWA   and    on,  phenobarb,  taper,   as  per dr ford    doing  better    pt then was  yelling  at  staff,  and wants to  leave     now,  states    son will pick  him up     encounter  time   >  30   minutes       rad< from: CT Abdomen and Pelvis w/ IV Cont (06.03.25 @ 07:11) >  IMPRESSION:  1.  No acute abdominal/pelvic pathology.  2.  Cirrhotic liver and portal hypertension  3.  Etiology of pain not determined  --- End of Report ---      <

## 2025-07-14 NOTE — DISCHARGE NOTE NURSING/CASE MANAGEMENT/SOCIAL WORK - PATIENT PORTAL LINK FT
You can access the FollowMyHealth Patient Portal offered by [facility] by registering at the following website: [email or web address]. By joining [facility]’s FollowMyHealth portal, you will also be able to view your health information using other applications (apps) compatible with our system.

## 2025-07-14 NOTE — BH CONSULTATION LIAISON PROGRESS NOTE - NSBHCONSULTRECOMMENDOTHER_PSY_A_CORE FT
7/9/25: continue symptom triggered CIWA; given nausea/vomiting, PO Librium will be changed out to IVP benzo   - PATIENT DID NOT PRESENT WITH ALCOHOL WITHDRAWAL BUT WITH ACUTE INTOXICATION WITH BAL   - hx of esophageal varices...reports rust colored vomitus and dark stools. + NPO, IV protonix, GI consult recommended.   - continue to offer Patient addiction services; would try to involved Patient's wife to encourage Patient to consider addiction treatment as she usually comes and picks him up   7/10/25: continue phenobarbital 129.6mg PO q6hrs x 24 hrs and then lower to 129.6mg PO q8hrs for alcohol withdrawal mgt with IVP phenobarb PRN for breakthrough sxs   - cannot leave AMA  - monitor BMs and emitus, reports any red/rust/black colors  7/11/25: cannot leave AMA at this time / also fall risk   continue to reduce/ taper off phenobarbital lower to 129.6mg PO q8hrs today  lower to 129.6mg PO BID for 7/12/25  lower to 64.8 mg PO bid for 7/13/25   lower to 32.4mg PO bid for 7/14/25 then stop       
7/9/25: continue symptom triggered CIWA; given nausea/vomiting, PO Librium will be changed out to IVP benzo   - PATIENT DID NOT PRESENT WITH ALCOHOL WITHDRAWAL BUT WITH ACUTE INTOXICATION WITH BAL   - hx of esophageal varices...reports rust colored vomitus and dark stools. + NPO, IV protonix, GI consult recommended.   - continue to offer Patient addiction services; would try to involved Patient's wife to encourage Patient to consider addiction treatment as she usually comes and picks him up   7/10/25: continue phenobarbital 129.6mg PO q6hrs x 24 hrs and then lower to 129.6mg PO q8hrs for alcohol withdrawal mgt with IVP phenobarb PRN for breakthrough sxs   - cannot leave AMA  - monitor BMs and emitus, reports any red/rust/black colors      
7/9/25: continue symptom triggered CIWA; given nausea/vomiting, PO Librium will be changed out to IVP benzo   - PATIENT DID NOT PRESENT WITH ALCOHOL WITHDRAWAL BUT WITH ACUTE INTOXICATION WITH BAL   - hx of esophageal varices...reports rust colored vomitus and dark stools. + NPO, IV protonix, GI consult recommended.   - continue to offer Patient addiction services; would try to involved Patient's wife to encourage Patient to consider addiction treatment as she usually comes and picks him up   7/10/25: continue phenobarbital 129.6mg PO q6hrs x 24 hrs and then lower to 129.6mg PO q8hrs for alcohol withdrawal mgt with IVP phenobarb PRN for breakthrough sxs   - cannot leave AMA  - monitor BMs and emitus, reports any red/rust/black colors  7/11/25: cannot leave AMA at this time / also fall risk   continue to reduce/ taper off phenobarbital lower to 129.6mg PO q8hrs today  lower to 129.6mg PO BID for 7/12/25  lower to 64.8 mg PO bid for 7/13/25   lower to 32.4mg PO bid for 7/14/25 then stop   7/14/25: last day of phenobarb taper; successful CIWA protocol  - Patient's mental status back to baseline  - [facility] signing off

## 2025-07-14 NOTE — BH CONSULTATION LIAISON PROGRESS NOTE - OTHER
deferred  appropriate  continues to be limited into the extent of his alcohol addiction  low end of fair

## 2025-07-14 NOTE — PROGRESS NOTE ADULT - PROVIDER SPECIALTY LIST ADULT
Critical Care
Internal Medicine
Infectious Disease
Internal Medicine

## 2025-07-14 NOTE — PROGRESS NOTE ADULT - REASON FOR ADMISSION
alcohol withdrawal, alcoholic gastritis

## 2025-07-14 NOTE — PROGRESS NOTE ADULT - ASSESSMENT
61 y/o man with h/o alcohol abuse, liver cirrhosis present to ED with vomiting, epigastric pain and alcohol withdrawal. Patient reported nausea and vomiting for a few days, associated with upper abdominal pain. Patient has recurrent hospitalization for alcohol intoxication and alcohol withdrawal complicated by DT.   Hemodynamically stable, afebrile. No leukocytosis.  Noted with LUE swelling x 1 day after an infiltrated peripheral IV line.    7/14: Afebrile no new labs. The left UE US doppler showed superficial thrombophlebitis. LUE swelling improved. He is off antibiotics     Impression:  LUE superficial thrombophlebitis, no cellulitis  EtOH withdrawal    Recommendations:  -Continue monitoring off of antibiotics   -Arm elevation. Warm compresses for symptomatic relief  -DC plans per primary     Discussed with primary team    June Sands MD  Attending Physician  Division of Infectious Diseases   Available via Microsoft Teams

## 2025-07-14 NOTE — BH CONSULTATION LIAISON PROGRESS NOTE - CURRENT MEDICATION
MEDICATIONS  (STANDING):  ceFAZolin   IVPB      ceFAZolin   IVPB 1000 milliGRAM(s) IV Intermittent once  ceFAZolin   IVPB 1000 milliGRAM(s) IV Intermittent every 8 hours  folic acid 1 milliGRAM(s) Oral daily  lactated ringers. 1000 milliLiter(s) (80 mL/Hr) IV Continuous <Continuous>  lactulose Syrup 20 Gram(s) Oral two times a day  magnesium sulfate  IVPB 1 Gram(s) IV Intermittent once  multivitamin 1 Tablet(s) Oral daily  pantoprazole  Injectable 40 milliGRAM(s) IV Push daily  rifAXIMin 550 milliGRAM(s) Oral two times a day  thiamine 100 milliGRAM(s) Oral daily  vancomycin  IVPB 1000 milliGRAM(s) IV Intermittent once    MEDICATIONS  (PRN):  acetaminophen     Tablet .. 650 milliGRAM(s) Oral every 6 hours PRN Mild Pain (1 - 3), Moderate Pain (4 - 6)  melatonin 3 milliGRAM(s) Oral at bedtime PRN Insomnia  morphine  - Injectable 2 milliGRAM(s) IV Push every 6 hours PRN Severe Pain (7 - 10)  ondansetron Injectable 4 milliGRAM(s) IV Push every 6 hours PRN Nausea and/or Vomiting  PHENobarbital Injectable 130 milliGRAM(s) IV Push every 4 hours PRN CIWA > 8 or higher  
MEDICATIONS  (STANDING):  folic acid 1 milliGRAM(s) Oral daily  lactated ringers. 1000 milliLiter(s) (80 mL/Hr) IV Continuous <Continuous>  lactulose Syrup 20 Gram(s) Oral two times a day  PHENobarbital 32.4 milliGRAM(s) Oral two times a day  rifAXIMin 550 milliGRAM(s) Oral two times a day    MEDICATIONS  (PRN):  
MEDICATIONS  (STANDING):  folic acid 1 milliGRAM(s) Oral daily  lactated ringers. 1000 milliLiter(s) (80 mL/Hr) IV Continuous <Continuous>  lactulose Syrup 20 Gram(s) Oral two times a day  multivitamin 1 Tablet(s) Oral daily  pantoprazole  Injectable 40 milliGRAM(s) IV Push daily  PHENobarbital 129.6 milliGRAM(s) Oral every 6 hours  potassium phosphate / sodium phosphate Powder (PHOS-NaK) 2 Packet(s) Oral once  rifAXIMin 550 milliGRAM(s) Oral two times a day  thiamine 100 milliGRAM(s) Oral daily    MEDICATIONS  (PRN):  acetaminophen     Tablet .. 650 milliGRAM(s) Oral every 6 hours PRN Mild Pain (1 - 3), Moderate Pain (4 - 6)  diazepam  Injectable 15 milliGRAM(s) IV Push every 6 hours PRN CIWA >12  melatonin 3 milliGRAM(s) Oral at bedtime PRN Insomnia  morphine  - Injectable 2 milliGRAM(s) IV Push every 6 hours PRN Severe Pain (7 - 10)  ondansetron Injectable 4 milliGRAM(s) IV Push every 6 hours PRN Nausea and/or Vomiting

## 2025-07-14 NOTE — PROGRESS NOTE ADULT - SUBJECTIVE AND OBJECTIVE BOX
date of service: 07-14-25 @ 09:55  [facility]  REVIEW OF SYSTEMS:  CONSTITUTIONAL: No fever,  no  weight loss  ENT:  No  tinnitus,   no   vertigo  NECK: No pain or stiffness  RESPIRATORY: No cough, wheezing, chills or hemoptysis;    No Shortness of Breath  CARDIOVASCULAR: No chest pain, palpitations, dizziness  GASTROINTESTINAL: No abdominal or epigastric pain. No nausea, vomiting, or hematemesis; No diarrhea  No melena or hematochezia.  GENITOURINARY: No dysuria, frequency, hematuria, or incontinence  NEUROLOGICAL: No headaches  SKIN: No itching,  no   rash  LYMPH Nodes: No enlarged glands  ENDOCRINE: No heat or cold intolerance  MUSCULOSKELETAL: No joint pain or swelling  PSYCHIATRIC: No depression, anxiety  HEME/LYMPH: No easy bruising, or bleeding gums  ALLERGY AND IMMUNOLOGIC: No hives or eczema	    MEDICATIONS  (STANDING):  ceFAZolin   IVPB 1000 milliGRAM(s) IV Intermittent every 8 hours  folic acid 1 milliGRAM(s) Oral daily  lactated ringers. 1000 milliLiter(s) (80 mL/Hr) IV Continuous <Continuous>  lactulose Syrup 20 Gram(s) Oral two times a day  multivitamin 1 Tablet(s) Oral daily  pantoprazole  Injectable 40 milliGRAM(s) IV Push daily  PHENobarbital 32.4 milliGRAM(s) Oral two times a day  rifAXIMin 550 milliGRAM(s) Oral two times a day  thiamine 100 milliGRAM(s) Oral daily    MEDICATIONS  (PRN):  acetaminophen     Tablet .. 650 milliGRAM(s) Oral every 6 hours PRN Mild Pain (1 - 3), Moderate Pain (4 - 6)  melatonin 3 milliGRAM(s) Oral at bedtime PRN Insomnia  ondansetron Injectable 4 milliGRAM(s) IV Push every 6 hours PRN Nausea and/or Vomiting  PHENobarbital Injectable 130 milliGRAM(s) IV Push every 4 hours PRN CIWA > 8 or higher      Vital Signs Last 24 Hrs  T(C): 36.3 (14 Jul 2025 04:00), Max: 36.6 (13 Jul 2025 17:00)  T(F): 97.3 (14 Jul 2025 04:00), Max: 97.9 (13 Jul 2025 20:00)  HR: 48 (14 Jul 2025 04:00) (48 - 98)  BP: 144/83 (14 Jul 2025 04:00) (121/78 - 164/93)  BP(mean): --  RR: 18 (14 Jul 2025 04:00) (18 - 18)  SpO2: 97% (14 Jul 2025 04:00) (96% - 100%)    Parameters below as of 14 Jul 2025 04:00  Patient On (Oxygen Delivery Method): room air      CAPILLARY BLOOD GLUCOSE        I&O's Summary    13 Jul 2025 07:01  -  14 Jul 2025 07:00  --------------------------------------------------------  IN: 0 mL / OUT: 300 mL / NET: -300 mL          Appearance: Normal	  HEENT:   Normal oral mucosa, PERRL, EOMI	  Lymphatic: No lymphadenopathy  Cardiovascular: Normal S1 S2, No JVD  Respiratory: Lungs clear to auscultation	  Gastrointestinal:  Soft, Non-tender, + BS	  Skin: No rash, No ecchymoses	  Extremities:     LABS:                                  Consultant(s) Notes Reviewed:      Care Discussed with Consultants/Other Providers:

## 2025-07-14 NOTE — DISCHARGE NOTE NURSING/CASE MANAGEMENT/SOCIAL WORK - NSDCVIVACCINE_GEN_ALL_CORE_FT
COVID-19, mRNA, LNP-S, PF, 100 mcg/ 0.5 mL dose (Moderna); 10-Jovan-2021 15:38; Reji Hernandez (RN); [employer] [id number] (Exp. Date: 13-Jul-2021); IntraMuscular; Deltoid Right.; 0.5 milliLiter(s);   influenza, injectable, quadrivalent, preservative free; 31-Jan-2019 15:53; Ayaka Bynum (RN); [employer]; [id number] (Exp. Date: 30-Jun-2019); IntraMuscular; Deltoid Right.; 0.5 milliLiter(s); VIS (VIS Published: 07-Aug-2015, VIS Presented: 31-Jan-2019);   influenza, injectable, quadrivalent, preservative free; 10-Nov-2019 14:13; Laverne Lane (RN); [employer]; [id number] (Exp. Date: 30-Jun-2020); IntraMuscular; Deltoid Left.; 0.5 milliLiter(s); VIS (VIS Published: 15-Aug-2019, VIS Presented: 10-Nov-2019);   influenza, injectable, quadrivalent, preservative free; 13-Oct-2020 11:56; Kimberli Cronin (RN); [employer]; [id number] (Exp. Date: 30-Jun-2021); IntraMuscular; Deltoid Right.; 0.5 milliLiter(s); VIS (VIS Published: 15-Aug-2019, VIS Presented: 13-Oct-2020);   Pneumococcal conjugate vaccine 20-valent (PCV20), polysaccharide [id number] conjugate, adjuvant, preserv; 04-Oct-2024 21:44; Colby Mckeon (RN); [employer]; [id number] (Exp. Date: 04-May-2025); IntraMuscular; Deltoid Left.; 0.5 milliLiter(s); VIS (VIS Published: 12-May-2023, VIS Presented: 04-Oct-2024);   Td (adult) preservative free; 18-Jan-2020 20:04; Yulia Vance (RN); [employer]; [id number] (Exp. Date: 19-Jan-2021); IntraMuscular; Deltoid Right.; 0.5 milliLiter(s); VIS (VIS Published: 18-Jan-2020, VIS Presented: 18-Jan-2020);

## 2025-07-14 NOTE — PROGRESS NOTE ADULT - SUBJECTIVE AND OBJECTIVE BOX
[facility]  INFECTIOUS DISEASES   [address] [place] [address]  Tel: [phone]     Fax: [phone]  ==============================================================================  DO Puma Mao MD Alexandra Gutman, RIVKA Sands MD  ==============================================================================      ALLENVANDANA  [facility]-[id number]  60y ([birth year])      Interval History: Seen and examined at bedside. He is awake and alert, appropriate. Afebrile. Says his left hand is now better. He is eager to leave the hospital    ROS:    [ ] Unobtainable because:  [x ] All other systems negative except as noted    Constitutional: no fever, no chills  Head: no trauma  Eyes: no vision changes, no eye pain  ENT:  no sore throat, no rhinorrhea  Cardiovascular:  no chest pain, no palpitation  Respiratory:  no SOB, no cough  GI:  no abd pain, no vomiting, no diarrhea  urinary: no dysuria, no hematuria, no flank pain  musculoskeletal:  no joint pain, no joint swelling  skin:  no rash  neurology:  no headache, no seizure, no change in mental status  psych: no anxiety, no depression       Allergies  No Known Allergies      ANTIMICROBIALS:  rifAXIMin 550 two times a day    OTHER MEDS:  folic acid 1 milliGRAM(s) Oral daily  lactated ringers. 1000 milliLiter(s) IV Continuous <Continuous>  lactulose Syrup 20 Gram(s) Oral two times a day  PHENobarbital 32.4 milliGRAM(s) Oral two times a day      Physical Exam:  Vital Signs Last 24 Hrs  T(C): 36.7 (14 Jul 2025 10:30), Max: 36.7 (14 Jul 2025 10:30)  T(F): 98 (14 Jul 2025 10:30), Max: 98 (14 Jul 2025 10:30)  HR: 60 (14 Jul 2025 10:30) (48 - 98)  BP: 144/83 (14 Jul 2025 10:30) (138/86 - 164/93)  BP(mean): --  RR: 17 (14 Jul 2025 10:30) (17 - 18)  SpO2: 100% (14 Jul 2025 10:30) (97% - 100%)    Parameters below as of 14 Jul 2025 10:30  Patient On (Oxygen Delivery Method): room air    Constitutional: non-toxic, no distress  HEAD/EYES: anicteric, no conjunctival injection  ENT:  supple, no thrush  Cardiovascular:   normal S1, S2, no murmur, no edema  Respiratory:  clear BS bilaterally, no wheezes, no rales  GI:  soft, non-tender, normal bowel sounds  :  no saxena, no CVA tenderness  Musculoskeletal:  LUE swelling improved- without warmth, erythema or tenderness  Neurologic: awake and alert, normal strength, no focal findings  Skin:  no rash, no erythema, no phlebitis  Heme/Onc: no lymphadenopathy   Psychiatric:  appropriate mood    WBC Count: 3.16 K/uL (07-10 @ 08:20)  WBC Count: 4.18 K/uL (07-09 @ 08:47)      Creatinine Trend: 0.85<--, 1.05<--, 1.02<--, 1.22<--, 0.89<--    MICROBIOLOGY:  Blood Blood-Peripheral  07-11-25   No growth at 48 Hours  --  --      RADIOLOGY:  < from: US Duplex Venous Upper Ext Ltd, Left (07.11.25 @ 15:34) >    IMPRESSION:  No evidence of left upper extremity deep venous thrombosis.    Superficial thrombophlebitis involving the left basilic and cephalic   veins.        --- End of Report ---    < end of copied text >

## 2025-07-14 NOTE — DISCHARGE NOTE PROVIDER - NSDCMRMEDTOKEN_GEN_ALL_CORE_FT
ferrous sulfate 325 mg (65 mg elemental iron) oral tablet: 1 tab(s) orally every other day Mon 6am, Wed 6 am, Fri 6am  folic acid 1 mg oral tablet: 1 tab(s) orally once a day  lactulose 10 g/15 mL oral syrup: 30 milliliter(s) orally 2 times a day up to 2-3 BMs per day  Multiple Vitamins oral tablet: 1 tab(s) orally once a day  pantoprazole 40 mg oral delayed release tablet: 1 tab(s) orally once a day (before a meal)  rifAXIMin 550 mg oral tablet: 1 tab(s) orally 2 times a day  thiamine 100 mg oral tablet: 1 tab(s) orally once a day   ferrous sulfate 325 mg (65 mg elemental iron) oral tablet: 1 tab(s) orally every other day Mon 6am, Wed 6 am, Fri 6am  folic acid 1 mg oral tablet: 1 tab(s) orally once a day  Multiple Vitamins oral tablet: 1 tab(s) orally once a day  pantoprazole 40 mg oral delayed release tablet: 1 tab(s) orally once a day (before a meal)  rifAXIMin 550 mg oral tablet: 1 tab(s) orally 2 times a day  thiamine 100 mg oral tablet: 1 tab(s) orally once a day

## 2025-07-14 NOTE — DISCHARGE NOTE PROVIDER - HOSPITAL COURSE
60 years old male     with h/o alcohol abuse, liver cirrhosis      present to ED with vomiting, epigastric pain and alcohol withdrawal. had   nausea and vomiting for a few days, .     recurrent hospitalization for alcohol intoxication.   withdrawal complicated by DT     serum alcohol 237.  ekg, nsr      ETOH  abuse /  intoxication .  Alcohol withdrawal syndrome.   serum alcohol 237.  was  rejected  by icu    recurrent hospitalization for alcohol intoxication / withdrawal      on CIWA/  iv  fluids   Liver cirrhosis, alcoholic.       on   rifaximin and lactulose  Alcohol cessation   advised    echo,   normal  ef     had  left   upper /  ante cubital arm,   swelling, from iv infiltration  apparently in  er,.  minimal  redness,  ,and   doppler   no  dvt    today. has   swelling /  pronounced  erythema  of  hand/  fingers, pulse  felt. , seen  by  surg, , no  intervention and  arm  has  improved    no  iv/  blood draws  on left  arm    redness  has lessened,    thrombophlebitis ,  basilic  and cephalic V,    bcx.  negative     on  CIWA   and    on,  phenobarb,  taper,   as  per dr ford    doing  better    pt then was  yelling  at  staff,   was  at  nursing  station, ambulating  well, demanding   to  leave     now,  states    son will pick  him up    	  60 years old male     with h/o alcohol abuse, liver cirrhosis      present to ED with vomiting, epigastric pain and alcohol withdrawal. had   nausea and vomiting for a few days, .     recurrent hospitalization for alcohol intoxication.   withdrawal complicated by DT     serum alcohol 237.  ekg, nsr      ETOH  abuse /  intoxication .  Alcohol withdrawal syndrome.   serum alcohol 237.  was  rejected  by icu    recurrent hospitalization for alcohol intoxication / withdrawal      on CIWA/  iv  fluids .  had  mild   hypophosphatemeia  Liver cirrhosis, alcoholic.       on   rifaximin and lactulose  Alcohol cessation   advised    echo,   normal  ef     had  left   upper /  ante cubital arm,   swelling, from iv infiltration  apparently in  er,.  minimal  redness,  ,and   doppler   no  dvt    today. has   swelling /  pronounced  erythema  of  hand/  fingers, pulse  felt. , seen  by  surg, , no  intervention and  arm  has  improved    no  iv/  blood draws  on left  arm    redness  has lessened,    thrombophlebitis ,  basilic  and cephalic V,    bcx.  negative     on  CIWA   and    on,  phenobarb,  taper,   as  per dr ford    doing  better    pt then was  yelling  at  staff,   was  at  nursing  station, ambulating  well, demanding   to  leave     now,  states    son will pick  him up

## 2025-07-14 NOTE — BH CONSULTATION LIAISON PROGRESS NOTE - NSBHCHARTREVIEWVS_PSY_A_CORE FT
Vital Signs Last 24 Hrs  T(C): 36.9 (10 Jul 2025 10:45), Max: 37.1 (09 Jul 2025 22:03)  T(F): 98.4 (10 Jul 2025 10:45), Max: 98.7 (09 Jul 2025 22:03)  HR: 56 (10 Jul 2025 10:45) (49 - 113)  BP: 124/75 (10 Jul 2025 10:45) (124/75 - 162/97)  BP(mean): 110 (09 Jul 2025 22:03) (109 - 112)  RR: 18 (10 Jul 2025 10:45) (16 - 23)  SpO2: 97% (10 Jul 2025 10:45) (97% - 99%)    Parameters below as of 10 Jul 2025 10:45  Patient On (Oxygen Delivery Method): nasal cannula    
Vital Signs Last 24 Hrs  T(C): 36.8 (11 Jul 2025 11:12), Max: 36.9 (10 Jul 2025 22:54)  T(F): 98.3 (11 Jul 2025 11:12), Max: 98.4 (10 Jul 2025 22:54)  HR: 69 (11 Jul 2025 11:12) (55 - 69)  BP: 128/88 (11 Jul 2025 11:12) (112/83 - 136/86)  BP(mean): --  RR: 18 (11 Jul 2025 11:12) (18 - 18)  SpO2: 100% (11 Jul 2025 11:12) (97% - 100%)    Parameters below as of 11 Jul 2025 07:00  Patient On (Oxygen Delivery Method): room air    
Vital Signs Last 24 Hrs  T(C): 36.7 (14 Jul 2025 10:30), Max: 36.7 (14 Jul 2025 10:30)  T(F): 98 (14 Jul 2025 10:30), Max: 98 (14 Jul 2025 10:30)  HR: 60 (14 Jul 2025 10:30) (48 - 98)  BP: 144/83 (14 Jul 2025 10:30) (138/86 - 164/93)  BP(mean): --  RR: 17 (14 Jul 2025 10:30) (17 - 18)  SpO2: 100% (14 Jul 2025 10:30) (97% - 100%)    Parameters below as of 14 Jul 2025 10:30  Patient On (Oxygen Delivery Method): room air

## 2025-07-14 NOTE — BH CONSULTATION LIAISON PROGRESS NOTE - NSBHASSESSMENTFT_PSY_ALL_CORE
Patient well known to  services - 59yo M with decades of heavy alcohol use disorder with physiological dependence, hx of withdrawals needing supervised medical withdrawal management, with lifelong low motivation to achieve sobriety/abstinence. Patient subsequently developed serious chronic medical sequela, including most recent diagnosis of liver failure, resulting in frequent ED presentations and medical admissions. Recently has been attending outpatient follow up with PCP Dr Archana Magallanes who saw Patient June 2025 as per HIE. Currently adequately controlled with CIWA/standing PO phenobarbital.   
Patient well known to  services - 59yo M with decades of heavy alcohol use disorder with physiological dependence, hx of withdrawals needing supervised medical withdrawal management, with lifelong low motivation to achieve sobriety/abstinence. Patient subsequently developed serious chronic medical sequela, including most recent diagnosis of liver failure, resulting in frequent ED presentations and medical admissions. Recently has been attending outpatient follow up with PCP Dr Archana Magallanes who saw Patient June 2025 as per HIE. Currently adequately controlled with CIWA/standing PO phenobarbital.   
Patient well known to  services - 59yo M with decades of heavy alcohol use disorder with physiological dependence, hx of withdrawals needing supervised medical withdrawal management, with lifelong low motivation to achieve sobriety/abstinence. Patient subsequently developed serious chronic medical sequela, including most recent diagnosis of liver failure, resulting in frequent ED presentations and medical admissions. Recently has been attending outpatient follow up with PCP Dr Archana Magallanes who saw Patient June 2025 as per HIE. Currently adequately controlled with CIWA/symptom-triggered meds and c/o of rust colored vomitus at home and dark stools with recent constipation.

## 2025-07-14 NOTE — BH CONSULTATION LIAISON PROGRESS NOTE - NSBHFUPINTERVALHXFT_PSY_A_CORE
RRT last night, chart event noted. Vitals showing HR on the lower end with systolic in 140/150s as on prior charts indicating baseline HTN. Using more prn morphine for abdominal discomfort 
No subsequent RTS or significant interval events since last seen. Alcohol withdrawal symptoms are well controlled on phenobarbital PO standing taper and Patient did not need any IVP PRNs for breakthrough withdrawal sxs. Seen at bedside - awake, alert with some mild somnolence which is expected given medications he is on. No physical signs of withdrawal such as diaphoresis, tremulousness. Abdominal discomfort with some improvement as per Pt. 
No significant interval events over the weekend. No subsequent RTS or significant interval events since last seen. Alcohol withdrawal symptoms are well controlled on phenobarbital PO standing taper which Patient is completing today. Minimal use of IVP PRNs for breakthrough withdrawal sxs. Seen at bedside - awake, alert, oriented x 4, talking in fluent Leah to a provider who also speaks it (reports Patient has linear, goal directed thought process). Patient feels better since admission - less abdominal discomfort and much less GI complaints. Tolerating regular diet. No physical signs of withdrawal such as diaphoresis, tremulousness.

## 2025-07-14 NOTE — BH CONSULTATION LIAISON PROGRESS NOTE - NSBHMSETHTCONTENT_PSY_A_CORE
Health Maintenance       Diabetes Eye Exam (Yearly)  Overdue since 6/3/2023    COVID-19 Vaccine (4 - 2023-24 season)  Overdue since 9/1/2023    Diabetes Foot Exam (Yearly)  Due soon on 5/12/2024    Depression Screening (Yearly)  Due soon on 5/12/2024           Following review of the above:  Patient is not proceeding with: Diabetes Eye Exam and COVID-19    Note: Refer to final orders and clinician documentation.      
Here for follow up of a muscle tightness in her left mid back   Happened before responded to dry needling   Has bene stretching , heat pads and muscle relaxants as well   Makes her tired         Treatment   Education about indications, contraindications and potential side effects completed with patient.  Screen Completed   Precautions Contraindications   Local skin lesions  Lyme disease  Local lymphedema  Severe hyperalgesia/allodynia  Metal allergies: nickel and chromium  Abnormal bleeding tendency  Immunodeficiency and/or compromised immune system  Second or third trimester of pregnancy  Vascular Disease  History of spontaneous pneumothorax Local or systemic infections; including the flu  Over implants  Active cancer  Area of lymphatic compromise  Area of lumpectomy/mastectomy  First trimester of pregnancy     The following potential risks and adverse effects were reviewed with the patient:  bleeding, broken/retained needle, bruising, fainting/dizziness, infection, internal organ injury, nerve injury, pain during and after treatment, pneumothorax resulting potentially in death, sweating, vasovagal response including cardiac arrest    Patient has verbally agreed to proceed with the intervention.    Dry Needling:   Needles inserted 8   Needles removed 8   Locations and Needle Size Left mid back along the latissmus dorsi    Treatment duration 20 mins   Patient response 20 mins        ASSESSMENT AND PLAN:  1)left upper back myofascial pain   Will see if dry needling helps   She has responded in the past   Continue flexeril prn           
Unremarkable

## 2025-07-14 NOTE — BH CONSULTATION LIAISON PROGRESS NOTE - NSBHATTESTBILLING_PSY_A_CORE
78216-Kkeotsybso OBS or IP - low complexity OR 25-34 mins
73111-Gckjjivsbt OBS or IP - low complexity OR 25-34 mins
94531-Pihbdkqggx OBS or IP - low complexity OR 25-34 mins

## 2025-07-14 NOTE — DISCHARGE NOTE PROVIDER - NSDCCPGOAL_GEN_ALL_CORE_FT
To get better and follow your care plan as instructed. To get better and follow your care plan as instructed.  Pt. will be free from withdrawal symptoms

## 2025-07-14 NOTE — DISCHARGE NOTE NURSING/CASE MANAGEMENT/SOCIAL WORK - FINANCIAL ASSISTANCE
[facility] provides services at a reduced cost to those who are determined to be eligible through [facility]’s financial assistance program. Information regarding [facility]’s financial assistance program can be found by going to [email or web address] or by calling [phone].

## 2025-07-14 NOTE — BH CONSULTATION LIAISON PROGRESS NOTE - NSICDXBHPRIMARYDX_PSY_ALL_CORE
Alcohol use disorder, severe, dependence   F10.20  

## 2025-07-16 LAB
CULTURE RESULTS: SIGNIFICANT CHANGE UP
SPECIMEN SOURCE: SIGNIFICANT CHANGE UP

## 2025-07-17 ENCOUNTER — RX CHANGE (OUTPATIENT)
Age: 60
End: 2025-07-17

## 2025-07-17 PROBLEM — K76.82 ACUTE HEPATIC ENCEPHALOPATHY: Status: ACTIVE | Noted: 2025-07-17

## 2025-07-17 RX ORDER — RIFAXIMIN 550 MG/1
550 TABLET ORAL
Qty: 180 | Refills: 0 | Status: ACTIVE | COMMUNITY
Start: 2025-07-17 | End: 1900-01-01

## 2025-07-17 NOTE — PATIENT PROFILE ADULT. - FALLEN IN THE PAST
25  Tatiana Mtz : 1982 Sex: female  Age 43 y.o.    Subjective:  Chief Complaint   Patient presents with    Mass       HPI:   Tatiana Mtz , 43 y.o. female presents to the clinic for evaluation of mass to right abdomen x 4 days. The patient reports the area is warm to touch, tender, and swollen. The patient has not taken any treatment for symptoms. The patient reports mildly worsening symptoms over time. Denies bleeding, drainage, lymphangitic streaking, fever, myalgia, arthralgia, chills, and lethargy. Denies headache, chest pain, abdominal pain, shortness of breath, and nausea / vomiting / diarrhea.    ROS:   Unless otherwise stated in this report the patient's positive and negative responses for review of systems for constitutional, eyes, ENT, cardiovascular, respiratory, gastrointestinal, neurological, , musculoskeletal, and integument systems and related systems to the presenting problem are either stated in the history of present illness or were not pertinent or were negative for the symptoms and/or complaints related to the presenting medical problem.  Positives and pertinent negatives as per HPI.  All others reviewed and are negative.      PMH:     Past Medical History:   Diagnosis Date    Abnormal Pap smear of cervix     normal since    Asthma     Breast cancer ([facility])     Chronic back pain     Chronic generalized pain disorder 2021    Chronic sinusitis     with facial pain    Class 3 severe obesity due to excess calories without serious comorbidity with body mass index (BMI) of 40.0 to 44.9 in adult ([facility])     DDD (degenerative disc disease), cervical 2021    Depression 2011    Essential hypertension     Gastroesophageal reflux disease without esophagitis     Hemochromatosis     History of therapeutic radiation     Hypertension     Invasive ductal carcinoma of breast (HCC) 2024    Migraines     aura    Osteoarthritis 2021    Paresthesias 08/10/2021  no
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
## 2025-07-21 DIAGNOSIS — K29.20 ALCOHOLIC GASTRITIS WITHOUT BLEEDING: ICD-10-CM

## 2025-07-21 DIAGNOSIS — I80.8 PHLEBITIS AND THROMBOPHLEBITIS OF OTHER SITES: ICD-10-CM

## 2025-07-21 DIAGNOSIS — K70.30 ALCOHOLIC CIRRHOSIS OF LIVER WITHOUT ASCITES: ICD-10-CM

## 2025-07-21 DIAGNOSIS — F10.230 ALCOHOL DEPENDENCE WITH WITHDRAWAL, UNCOMPLICATED: ICD-10-CM

## 2025-07-21 DIAGNOSIS — K76.6 PORTAL HYPERTENSION: ICD-10-CM

## 2025-07-21 DIAGNOSIS — F10.221 ALCOHOL DEPENDENCE WITH INTOXICATION DELIRIUM: ICD-10-CM

## 2025-07-21 DIAGNOSIS — K27.9 PEPTIC ULCER, SITE UNSPECIFIED, UNSPECIFIED AS ACUTE OR CHRONIC, WITHOUT HEMORRHAGE OR PERFORATION: ICD-10-CM

## 2025-07-21 DIAGNOSIS — D53.1 OTHER MEGALOBLASTIC ANEMIAS, NOT ELSEWHERE CLASSIFIED: ICD-10-CM

## 2025-07-22 NOTE — PATIENT PROFILE ADULT - FUNCTIONAL ASSESSMENT - BASIC MOBILITY 6.
"Instructed on date and arrival time of 0600. Instructed that arrival time is not their procedure time but allows time to prepare for procedure.  Come to entrance \"C\". Must have  over age 18 to drive home.  May have two visitors; however, children under 12 must stay in waiting room.  Discussed clear liquid diet (no red or purple), bowel prep, and NPO.  May take medications as usual except for blood thinners, diabetic medications, and weight loss medications.  Absolutely nothing by mouth 2 hours prior to arrival.  Leave jewelry and other valuables at home.  Expect to be at hospital for 3-4 hours.  Verbalized understanding of instructions given.  Instructed to call for questions or concerns.  Hold phentermine for one week prior to procedure.  " 1 = Total assistance

## 2025-07-31 ENCOUNTER — TRANSCRIPTION ENCOUNTER (OUTPATIENT)
Age: 60
End: 2025-07-31

## 2025-08-04 ENCOUNTER — INPATIENT (INPATIENT)
Facility: HOSPITAL | Age: 60
LOS: 1 days | Discharge: HOME | End: 2025-08-06
Attending: STUDENT IN AN ORGANIZED HEALTH CARE EDUCATION/TRAINING PROGRAM | Admitting: STUDENT IN AN ORGANIZED HEALTH CARE EDUCATION/TRAINING PROGRAM
Payer: MEDICAID

## 2025-08-04 VITALS
RESPIRATION RATE: 18 BRPM | OXYGEN SATURATION: 100 % | DIASTOLIC BLOOD PRESSURE: 98 MMHG | HEIGHT: 67 IN | SYSTOLIC BLOOD PRESSURE: 148 MMHG | WEIGHT: 160.06 LBS | HEART RATE: 120 BPM | TEMPERATURE: 98 F

## 2025-08-04 LAB
ALBUMIN SERPL ELPH-MCNC: 4.2 G/DL — SIGNIFICANT CHANGE UP (ref 3.3–5)
ALP SERPL-CCNC: 92 U/L — SIGNIFICANT CHANGE UP (ref 40–120)
ALT FLD-CCNC: 68 U/L — SIGNIFICANT CHANGE UP (ref 12–78)
ANION GAP SERPL CALC-SCNC: 19 MMOL/L — HIGH (ref 5–17)
AST SERPL-CCNC: 118 U/L — HIGH (ref 15–37)
BASOPHILS # BLD AUTO: 0.06 K/UL — SIGNIFICANT CHANGE UP (ref 0–0.2)
BASOPHILS NFR BLD AUTO: 1.1 % — SIGNIFICANT CHANGE UP (ref 0–2)
BILIRUB SERPL-MCNC: 0.7 MG/DL — SIGNIFICANT CHANGE UP (ref 0.2–1.2)
BUN SERPL-MCNC: 17 MG/DL — SIGNIFICANT CHANGE UP (ref 7–23)
CALCIUM SERPL-MCNC: 8.9 MG/DL — SIGNIFICANT CHANGE UP (ref 8.5–10.1)
CHLORIDE SERPL-SCNC: 102 MMOL/L — SIGNIFICANT CHANGE UP (ref 96–108)
CO2 SERPL-SCNC: 19 MMOL/L — LOW (ref 22–31)
CREAT SERPL-MCNC: 1.54 MG/DL — HIGH (ref 0.5–1.3)
EGFR: 52 ML/MIN/1.73M2 — LOW
EGFR: 52 ML/MIN/1.73M2 — LOW
EOSINOPHIL # BLD AUTO: 0.01 K/UL — SIGNIFICANT CHANGE UP (ref 0–0.5)
EOSINOPHIL NFR BLD AUTO: 0.2 % — SIGNIFICANT CHANGE UP (ref 0–6)
FLUAV AG NPH QL: SIGNIFICANT CHANGE UP
FLUBV AG NPH QL: SIGNIFICANT CHANGE UP
GLUCOSE SERPL-MCNC: 117 MG/DL — HIGH (ref 70–99)
HCT VFR BLD CALC: 45.2 % — SIGNIFICANT CHANGE UP (ref 39–50)
HGB BLD-MCNC: 14.9 G/DL — SIGNIFICANT CHANGE UP (ref 13–17)
IMM GRANULOCYTES NFR BLD AUTO: 0.2 % — SIGNIFICANT CHANGE UP (ref 0–0.9)
LIDOCAIN IGE QN: 41 U/L — SIGNIFICANT CHANGE UP (ref 13–75)
LYMPHOCYTES # BLD AUTO: 2.13 K/UL — SIGNIFICANT CHANGE UP (ref 1–3.3)
LYMPHOCYTES # BLD AUTO: 40 % — SIGNIFICANT CHANGE UP (ref 13–44)
MAGNESIUM SERPL-MCNC: 2.1 MG/DL — SIGNIFICANT CHANGE UP (ref 1.6–2.6)
MCHC RBC-ENTMCNC: 24.7 PG — LOW (ref 27–34)
MCHC RBC-ENTMCNC: 33 G/DL — SIGNIFICANT CHANGE UP (ref 32–36)
MCV RBC AUTO: 75 FL — LOW (ref 80–100)
MONOCYTES # BLD AUTO: 0.2 K/UL — SIGNIFICANT CHANGE UP (ref 0–0.9)
MONOCYTES NFR BLD AUTO: 3.8 % — SIGNIFICANT CHANGE UP (ref 2–14)
NEUTROPHILS # BLD AUTO: 2.91 K/UL — SIGNIFICANT CHANGE UP (ref 1.8–7.4)
NEUTROPHILS NFR BLD AUTO: 54.7 % — SIGNIFICANT CHANGE UP (ref 43–77)
NRBC BLD AUTO-RTO: 0 /100 WBCS — SIGNIFICANT CHANGE UP (ref 0–0)
PLATELET # BLD AUTO: 265 K/UL — SIGNIFICANT CHANGE UP (ref 150–400)
POTASSIUM SERPL-MCNC: 4.1 MMOL/L — SIGNIFICANT CHANGE UP (ref 3.5–5.3)
POTASSIUM SERPL-SCNC: 4.1 MMOL/L — SIGNIFICANT CHANGE UP (ref 3.5–5.3)
PROT SERPL-MCNC: 9.4 GM/DL — HIGH (ref 6–8.3)
RBC # BLD: 6.03 M/UL — HIGH (ref 4.2–5.8)
RBC # FLD: 25 % — HIGH (ref 10.3–14.5)
RSV RNA NPH QL NAA+NON-PROBE: SIGNIFICANT CHANGE UP
SARS-COV-2 RNA SPEC QL NAA+PROBE: SIGNIFICANT CHANGE UP
SODIUM SERPL-SCNC: 140 MMOL/L — SIGNIFICANT CHANGE UP (ref 135–145)
SOURCE RESPIRATORY: SIGNIFICANT CHANGE UP
WBC # BLD: 5.32 K/UL — SIGNIFICANT CHANGE UP (ref 3.8–10.5)
WBC # FLD AUTO: 5.32 K/UL — SIGNIFICANT CHANGE UP (ref 3.8–10.5)

## 2025-08-04 PROCEDURE — 74177 CT ABD & PELVIS W/CONTRAST: CPT | Mod: 26

## 2025-08-04 PROCEDURE — 70450 CT HEAD/BRAIN W/O DYE: CPT | Mod: 26

## 2025-08-04 PROCEDURE — 36410 VNPNXR 3YR/> PHY/QHP DX/THER: CPT

## 2025-08-04 PROCEDURE — 76705 ECHO EXAM OF ABDOMEN: CPT | Mod: 26

## 2025-08-04 PROCEDURE — 99285 EMERGENCY DEPT VISIT HI MDM: CPT | Mod: 25

## 2025-08-04 PROCEDURE — 93010 ELECTROCARDIOGRAM REPORT: CPT

## 2025-08-04 RX ORDER — HALOPERIDOL 10 MG/1
5 TABLET ORAL ONCE
Refills: 0 | Status: COMPLETED | OUTPATIENT
Start: 2025-08-04 | End: 2025-08-04

## 2025-08-04 RX ORDER — DIAZEPAM 5 MG/1
5 TABLET ORAL ONCE
Refills: 0 | Status: DISCONTINUED | OUTPATIENT
Start: 2025-08-04 | End: 2025-08-04

## 2025-08-04 RX ORDER — ACETAMINOPHEN 500 MG/5ML
1000 LIQUID (ML) ORAL ONCE
Refills: 0 | Status: COMPLETED | OUTPATIENT
Start: 2025-08-04 | End: 2025-08-04

## 2025-08-04 RX ADMIN — Medication 1000 MILLILITER(S): at 19:06

## 2025-08-04 RX ADMIN — DIAZEPAM 5 MILLIGRAM(S): 5 TABLET ORAL at 22:06

## 2025-08-04 RX ADMIN — Medication 400 MILLIGRAM(S): at 19:06

## 2025-08-04 RX ADMIN — HALOPERIDOL 5 MILLIGRAM(S): 10 TABLET ORAL at 22:06

## 2025-08-05 DIAGNOSIS — F10.939 ALCOHOL USE, UNSPECIFIED WITH WITHDRAWAL, UNSPECIFIED: ICD-10-CM

## 2025-08-05 DIAGNOSIS — N17.9 ACUTE KIDNEY FAILURE, UNSPECIFIED: ICD-10-CM

## 2025-08-05 DIAGNOSIS — K70.30 ALCOHOLIC CIRRHOSIS OF LIVER WITHOUT ASCITES: ICD-10-CM

## 2025-08-05 DIAGNOSIS — K29.20 ALCOHOLIC GASTRITIS WITHOUT BLEEDING: ICD-10-CM

## 2025-08-05 LAB
ALBUMIN SERPL ELPH-MCNC: 3.6 G/DL — SIGNIFICANT CHANGE UP (ref 3.3–5)
ALP SERPL-CCNC: 76 U/L — SIGNIFICANT CHANGE UP (ref 40–120)
ALT FLD-CCNC: 242 U/L — HIGH (ref 12–78)
AMMONIA BLD-MCNC: 75 UMOL/L — HIGH (ref 11–32)
AMPHET UR-MCNC: NEGATIVE — SIGNIFICANT CHANGE UP
ANION GAP SERPL CALC-SCNC: 10 MMOL/L — SIGNIFICANT CHANGE UP (ref 5–17)
APPEARANCE UR: CLEAR — SIGNIFICANT CHANGE UP
AST SERPL-CCNC: 553 U/L — HIGH (ref 15–37)
BARBITURATES UR SCN-MCNC: POSITIVE — SIGNIFICANT CHANGE UP
BENZODIAZ UR-MCNC: POSITIVE — SIGNIFICANT CHANGE UP
BILIRUB SERPL-MCNC: 1 MG/DL — SIGNIFICANT CHANGE UP (ref 0.2–1.2)
BILIRUB UR-MCNC: NEGATIVE — SIGNIFICANT CHANGE UP
BUN SERPL-MCNC: 18 MG/DL — SIGNIFICANT CHANGE UP (ref 7–23)
CALCIUM SERPL-MCNC: 8.8 MG/DL — SIGNIFICANT CHANGE UP (ref 8.5–10.1)
CHLORIDE SERPL-SCNC: 107 MMOL/L — SIGNIFICANT CHANGE UP (ref 96–108)
CK SERPL-CCNC: 793 U/L — HIGH (ref 26–308)
CO2 SERPL-SCNC: 23 MMOL/L — SIGNIFICANT CHANGE UP (ref 22–31)
COCAINE METAB.OTHER UR-MCNC: NEGATIVE — SIGNIFICANT CHANGE UP
COLOR SPEC: YELLOW — SIGNIFICANT CHANGE UP
CREAT SERPL-MCNC: 1.09 MG/DL — SIGNIFICANT CHANGE UP (ref 0.5–1.3)
DIFF PNL FLD: ABNORMAL
EGFR: 79 ML/MIN/1.73M2 — SIGNIFICANT CHANGE UP
EGFR: 79 ML/MIN/1.73M2 — SIGNIFICANT CHANGE UP
GLUCOSE SERPL-MCNC: 116 MG/DL — HIGH (ref 70–99)
GLUCOSE UR QL: NEGATIVE MG/DL — SIGNIFICANT CHANGE UP
HCT VFR BLD CALC: 38 % — LOW (ref 39–50)
HGB BLD-MCNC: 12.5 G/DL — LOW (ref 13–17)
KETONES UR QL: 15 MG/DL
LEUKOCYTE ESTERASE UR-ACNC: NEGATIVE — SIGNIFICANT CHANGE UP
MAGNESIUM SERPL-MCNC: 1.9 MG/DL — SIGNIFICANT CHANGE UP (ref 1.6–2.6)
MCHC RBC-ENTMCNC: 24.7 PG — LOW (ref 27–34)
MCHC RBC-ENTMCNC: 32.9 G/DL — SIGNIFICANT CHANGE UP (ref 32–36)
MCV RBC AUTO: 75 FL — LOW (ref 80–100)
METHADONE UR-MCNC: NEGATIVE — SIGNIFICANT CHANGE UP
NITRITE UR-MCNC: NEGATIVE — SIGNIFICANT CHANGE UP
NRBC BLD AUTO-RTO: 0 /100 WBCS — SIGNIFICANT CHANGE UP (ref 0–0)
OPIATES UR-MCNC: POSITIVE — SIGNIFICANT CHANGE UP
PCP SPEC-MCNC: SIGNIFICANT CHANGE UP
PCP UR-MCNC: NEGATIVE — SIGNIFICANT CHANGE UP
PH UR: 6 — SIGNIFICANT CHANGE UP (ref 5–8)
PHOSPHATE SERPL-MCNC: 1.2 MG/DL — LOW (ref 2.5–4.5)
PLATELET # BLD AUTO: 171 K/UL — SIGNIFICANT CHANGE UP (ref 150–400)
POTASSIUM SERPL-MCNC: 4.7 MMOL/L — SIGNIFICANT CHANGE UP (ref 3.5–5.3)
POTASSIUM SERPL-SCNC: 4.7 MMOL/L — SIGNIFICANT CHANGE UP (ref 3.5–5.3)
PROT SERPL-MCNC: 8.1 GM/DL — SIGNIFICANT CHANGE UP (ref 6–8.3)
PROT UR-MCNC: 100 MG/DL
RBC # BLD: 5.07 M/UL — SIGNIFICANT CHANGE UP (ref 4.2–5.8)
RBC # FLD: 24 % — HIGH (ref 10.3–14.5)
SODIUM SERPL-SCNC: 140 MMOL/L — SIGNIFICANT CHANGE UP (ref 135–145)
SP GR SPEC: >1.03 — HIGH (ref 1–1.03)
THC UR QL: NEGATIVE — SIGNIFICANT CHANGE UP
UROBILINOGEN FLD QL: 0.2 MG/DL — SIGNIFICANT CHANGE UP (ref 0.2–1)
WBC # BLD: 6.78 K/UL — SIGNIFICANT CHANGE UP (ref 3.8–10.5)
WBC # FLD AUTO: 6.78 K/UL — SIGNIFICANT CHANGE UP (ref 3.8–10.5)

## 2025-08-05 PROCEDURE — 99223 1ST HOSP IP/OBS HIGH 75: CPT

## 2025-08-05 PROCEDURE — 76937 US GUIDE VASCULAR ACCESS: CPT | Mod: 26

## 2025-08-05 PROCEDURE — 36000 PLACE NEEDLE IN VEIN: CPT

## 2025-08-05 RX ORDER — CHLORDIAZEPOXIDE HCL 10 MG
50 CAPSULE ORAL ONCE
Refills: 0 | Status: DISCONTINUED | OUTPATIENT
Start: 2025-08-05 | End: 2025-08-05

## 2025-08-05 RX ORDER — FOLIC ACID 1 MG/1
1 TABLET ORAL DAILY
Refills: 0 | Status: DISCONTINUED | OUTPATIENT
Start: 2025-08-05 | End: 2025-08-06

## 2025-08-05 RX ORDER — LIDOCAINE HCL/PF 10 MG/ML
3 VIAL (ML) INJECTION
Refills: 0 | Status: DISCONTINUED | OUTPATIENT
Start: 2025-08-05 | End: 2025-08-06

## 2025-08-05 RX ORDER — SODIUM PHOSPHATE,DIBASIC DIHYD
30 POWDER (GRAM) MISCELLANEOUS ONCE
Refills: 0 | Status: COMPLETED | OUTPATIENT
Start: 2025-08-05 | End: 2025-08-05

## 2025-08-05 RX ORDER — ONDANSETRON HCL/PF 4 MG/2 ML
4 VIAL (ML) INJECTION EVERY 6 HOURS
Refills: 0 | Status: DISCONTINUED | OUTPATIENT
Start: 2025-08-05 | End: 2025-08-06

## 2025-08-05 RX ORDER — ACETAMINOPHEN 500 MG/5ML
975 LIQUID (ML) ORAL ONCE
Refills: 0 | Status: COMPLETED | OUTPATIENT
Start: 2025-08-05 | End: 2025-08-05

## 2025-08-05 RX ORDER — DIAZEPAM 5 MG/1
5 TABLET ORAL
Refills: 0 | Status: DISCONTINUED | OUTPATIENT
Start: 2025-08-05 | End: 2025-08-06

## 2025-08-05 RX ORDER — MAGNESIUM, ALUMINUM HYDROXIDE 200-200 MG
30 TABLET,CHEWABLE ORAL EVERY 4 HOURS
Refills: 0 | Status: DISCONTINUED | OUTPATIENT
Start: 2025-08-05 | End: 2025-08-06

## 2025-08-05 RX ORDER — ACETAMINOPHEN 500 MG/5ML
650 LIQUID (ML) ORAL EVERY 8 HOURS
Refills: 0 | Status: DISCONTINUED | OUTPATIENT
Start: 2025-08-05 | End: 2025-08-05

## 2025-08-05 RX ORDER — LORAZEPAM 4 MG/ML
1 VIAL (ML) INJECTION ONCE
Refills: 0 | Status: DISCONTINUED | OUTPATIENT
Start: 2025-08-05 | End: 2025-08-05

## 2025-08-05 RX ORDER — LORAZEPAM 4 MG/ML
VIAL (ML) INJECTION
Refills: 0 | Status: DISCONTINUED | OUTPATIENT
Start: 2025-08-05 | End: 2025-08-06

## 2025-08-05 RX ORDER — LORAZEPAM 4 MG/ML
1 VIAL (ML) INJECTION EVERY 6 HOURS
Refills: 0 | Status: DISCONTINUED | OUTPATIENT
Start: 2025-08-07 | End: 2025-08-06

## 2025-08-05 RX ORDER — KETOROLAC TROMETHAMINE 30 MG/ML
15 INJECTION, SOLUTION INTRAMUSCULAR; INTRAVENOUS ONCE
Refills: 0 | Status: DISCONTINUED | OUTPATIENT
Start: 2025-08-05 | End: 2025-08-05

## 2025-08-05 RX ORDER — B1/B2/B3/B5/B6/B12/VIT C/FOLIC 500-0.5 MG
1 TABLET ORAL DAILY
Refills: 0 | Status: DISCONTINUED | OUTPATIENT
Start: 2025-08-05 | End: 2025-08-06

## 2025-08-05 RX ORDER — MELATONIN 5 MG
3 TABLET ORAL AT BEDTIME
Refills: 0 | Status: DISCONTINUED | OUTPATIENT
Start: 2025-08-05 | End: 2025-08-06

## 2025-08-05 RX ORDER — LACTULOSE 10 G/15ML
10 SOLUTION ORAL THREE TIMES A DAY
Refills: 0 | Status: DISCONTINUED | OUTPATIENT
Start: 2025-08-05 | End: 2025-08-06

## 2025-08-05 RX ORDER — LORAZEPAM 4 MG/ML
1.5 VIAL (ML) INJECTION EVERY 6 HOURS
Refills: 0 | Status: DISCONTINUED | OUTPATIENT
Start: 2025-08-06 | End: 2025-08-06

## 2025-08-05 RX ORDER — LORAZEPAM 4 MG/ML
0.5 VIAL (ML) INJECTION EVERY 6 HOURS
Refills: 0 | Status: CANCELLED | OUTPATIENT
Start: 2025-08-08 | End: 2025-08-06

## 2025-08-05 RX ORDER — LORAZEPAM 4 MG/ML
2 VIAL (ML) INJECTION EVERY 6 HOURS
Refills: 0 | Status: DISCONTINUED | OUTPATIENT
Start: 2025-08-05 | End: 2025-08-06

## 2025-08-05 RX ORDER — FERROUS SULFATE 137(45) MG
325 TABLET, EXTENDED RELEASE ORAL DAILY
Refills: 0 | Status: DISCONTINUED | OUTPATIENT
Start: 2025-08-05 | End: 2025-08-06

## 2025-08-05 RX ORDER — SODIUM CHLORIDE 9 G/1000ML
1000 INJECTION, SOLUTION INTRAVENOUS
Refills: 0 | Status: DISCONTINUED | OUTPATIENT
Start: 2025-08-05 | End: 2025-08-06

## 2025-08-05 RX ADMIN — Medication 2 MILLIGRAM(S): at 12:22

## 2025-08-05 RX ADMIN — FOLIC ACID 1 MILLIGRAM(S): 1 TABLET ORAL at 12:03

## 2025-08-05 RX ADMIN — Medication 1 MILLIGRAM(S): at 08:58

## 2025-08-05 RX ADMIN — Medication 50 MILLIGRAM(S): at 06:42

## 2025-08-05 RX ADMIN — Medication 2 MILLIGRAM(S): at 18:36

## 2025-08-05 RX ADMIN — Medication 100 MILLIGRAM(S): at 10:19

## 2025-08-05 RX ADMIN — SODIUM CHLORIDE 125 MILLILITER(S): 9 INJECTION, SOLUTION INTRAVENOUS at 10:19

## 2025-08-05 RX ADMIN — Medication 3 MILLIGRAM(S): at 21:32

## 2025-08-05 RX ADMIN — Medication 1 MILLIGRAM(S): at 07:55

## 2025-08-05 RX ADMIN — Medication 4 MILLIGRAM(S): at 00:17

## 2025-08-05 RX ADMIN — LACTULOSE 10 GRAM(S): 10 SOLUTION ORAL at 21:32

## 2025-08-05 RX ADMIN — Medication 1 TABLET(S): at 12:03

## 2025-08-05 RX ADMIN — Medication 325 MILLIGRAM(S): at 12:03

## 2025-08-05 RX ADMIN — Medication 975 MILLIGRAM(S): at 06:49

## 2025-08-05 RX ADMIN — KETOROLAC TROMETHAMINE 15 MILLIGRAM(S): 30 INJECTION, SOLUTION INTRAMUSCULAR; INTRAVENOUS at 18:55

## 2025-08-05 RX ADMIN — Medication 40 MILLIGRAM(S): at 10:19

## 2025-08-05 RX ADMIN — DIAZEPAM 5 MILLIGRAM(S): 5 TABLET ORAL at 12:03

## 2025-08-05 RX ADMIN — Medication 20 MILLIGRAM(S): at 05:02

## 2025-08-05 RX ADMIN — DIAZEPAM 5 MILLIGRAM(S): 5 TABLET ORAL at 20:19

## 2025-08-05 RX ADMIN — DIAZEPAM 5 MILLIGRAM(S): 5 TABLET ORAL at 18:55

## 2025-08-05 RX ADMIN — DIAZEPAM 5 MILLIGRAM(S): 5 TABLET ORAL at 21:32

## 2025-08-05 RX ADMIN — Medication 85 MILLIMOLE(S): at 14:48

## 2025-08-05 RX ADMIN — DIAZEPAM 5 MILLIGRAM(S): 5 TABLET ORAL at 14:00

## 2025-08-06 ENCOUNTER — TRANSCRIPTION ENCOUNTER (OUTPATIENT)
Age: 60
End: 2025-08-06

## 2025-08-06 VITALS
DIASTOLIC BLOOD PRESSURE: 79 MMHG | HEART RATE: 94 BPM | OXYGEN SATURATION: 96 % | SYSTOLIC BLOOD PRESSURE: 133 MMHG | RESPIRATION RATE: 19 BRPM | TEMPERATURE: 98 F

## 2025-08-06 LAB
ALBUMIN SERPL ELPH-MCNC: 3.1 G/DL — LOW (ref 3.3–5)
ALP SERPL-CCNC: 90 U/L — SIGNIFICANT CHANGE UP (ref 40–120)
ALT FLD-CCNC: 196 U/L — HIGH (ref 12–78)
AMMONIA BLD-MCNC: 70 UMOL/L — HIGH (ref 11–32)
ANION GAP SERPL CALC-SCNC: 7 MMOL/L — SIGNIFICANT CHANGE UP (ref 5–17)
AST SERPL-CCNC: 352 U/L — HIGH (ref 15–37)
BILIRUB SERPL-MCNC: 2.1 MG/DL — HIGH (ref 0.2–1.2)
BUN SERPL-MCNC: 15 MG/DL — SIGNIFICANT CHANGE UP (ref 7–23)
CALCIUM SERPL-MCNC: 8.5 MG/DL — SIGNIFICANT CHANGE UP (ref 8.5–10.1)
CHLORIDE SERPL-SCNC: 103 MMOL/L — SIGNIFICANT CHANGE UP (ref 96–108)
CO2 SERPL-SCNC: 27 MMOL/L — SIGNIFICANT CHANGE UP (ref 22–31)
CREAT SERPL-MCNC: 1.05 MG/DL — SIGNIFICANT CHANGE UP (ref 0.5–1.3)
EGFR: 82 ML/MIN/1.73M2 — SIGNIFICANT CHANGE UP
EGFR: 82 ML/MIN/1.73M2 — SIGNIFICANT CHANGE UP
ETHANOL SERPL-MCNC: <10 MG/DL — SIGNIFICANT CHANGE UP (ref 0–10)
GLUCOSE SERPL-MCNC: 206 MG/DL — HIGH (ref 70–99)
HCT VFR BLD CALC: 34.6 % — LOW (ref 39–50)
HGB BLD-MCNC: 11.1 G/DL — LOW (ref 13–17)
MAGNESIUM SERPL-MCNC: 1.7 MG/DL — SIGNIFICANT CHANGE UP (ref 1.6–2.6)
MCHC RBC-ENTMCNC: 24.4 PG — LOW (ref 27–34)
MCHC RBC-ENTMCNC: 32.1 G/DL — SIGNIFICANT CHANGE UP (ref 32–36)
MCV RBC AUTO: 76 FL — LOW (ref 80–100)
NRBC BLD AUTO-RTO: 0 /100 WBCS — SIGNIFICANT CHANGE UP (ref 0–0)
PHOSPHATE SERPL-MCNC: 2 MG/DL — LOW (ref 2.5–4.5)
PLATELET # BLD AUTO: 122 K/UL — LOW (ref 150–400)
POTASSIUM SERPL-MCNC: 3.2 MMOL/L — LOW (ref 3.5–5.3)
POTASSIUM SERPL-SCNC: 3.2 MMOL/L — LOW (ref 3.5–5.3)
PROT SERPL-MCNC: 7.2 GM/DL — SIGNIFICANT CHANGE UP (ref 6–8.3)
RBC # BLD: 4.55 M/UL — SIGNIFICANT CHANGE UP (ref 4.2–5.8)
RBC # FLD: 23 % — HIGH (ref 10.3–14.5)
SODIUM SERPL-SCNC: 137 MMOL/L — SIGNIFICANT CHANGE UP (ref 135–145)
WBC # BLD: 3.04 K/UL — LOW (ref 3.8–10.5)
WBC # FLD AUTO: 3.04 K/UL — LOW (ref 3.8–10.5)

## 2025-08-06 PROCEDURE — 99239 HOSP IP/OBS DSCHRG MGMT >30: CPT

## 2025-08-06 RX ORDER — FERROUS SULFATE 137(45) MG
1 TABLET, EXTENDED RELEASE ORAL
Qty: 0 | Refills: 0 | DISCHARGE
Start: 2025-08-06

## 2025-08-06 RX ORDER — RIFAXIMIN 550 MG/1
1 TABLET ORAL
Qty: 0 | Refills: 0 | DISCHARGE
Start: 2025-08-06

## 2025-08-06 RX ORDER — SOD PHOS DI, MONO/K PHOS MONO 250 MG
1 TABLET ORAL EVERY 4 HOURS
Refills: 0 | Status: COMPLETED | OUTPATIENT
Start: 2025-08-06 | End: 2025-08-06

## 2025-08-06 RX ORDER — DIAZEPAM 5 MG/1
5 TABLET ORAL
Refills: 0 | Status: DISCONTINUED | OUTPATIENT
Start: 2025-08-06 | End: 2025-08-06

## 2025-08-06 RX ADMIN — DIAZEPAM 5 MILLIGRAM(S): 5 TABLET ORAL at 03:36

## 2025-08-06 RX ADMIN — Medication 1 PACKET(S): at 11:27

## 2025-08-06 RX ADMIN — Medication 100 MILLIGRAM(S): at 11:16

## 2025-08-06 RX ADMIN — Medication 325 MILLIGRAM(S): at 11:16

## 2025-08-06 RX ADMIN — LACTULOSE 10 GRAM(S): 10 SOLUTION ORAL at 05:05

## 2025-08-06 RX ADMIN — DIAZEPAM 5 MILLIGRAM(S): 5 TABLET ORAL at 01:14

## 2025-08-06 RX ADMIN — Medication 2 MILLIGRAM(S): at 00:01

## 2025-08-06 RX ADMIN — Medication 1 PACKET(S): at 13:47

## 2025-08-06 RX ADMIN — Medication 1.5 MILLIGRAM(S): at 11:54

## 2025-08-06 RX ADMIN — FOLIC ACID 1 MILLIGRAM(S): 1 TABLET ORAL at 11:16

## 2025-08-06 RX ADMIN — DIAZEPAM 5 MILLIGRAM(S): 5 TABLET ORAL at 06:39

## 2025-08-06 RX ADMIN — Medication 2 MILLIGRAM(S): at 05:05

## 2025-08-06 RX ADMIN — Medication 40 MILLIEQUIVALENT(S): at 11:27

## 2025-08-06 RX ADMIN — Medication 1 TABLET(S): at 11:16

## 2025-08-06 RX ADMIN — Medication 40 MILLIGRAM(S): at 06:39

## 2025-08-09 DIAGNOSIS — N17.9 ACUTE KIDNEY FAILURE, UNSPECIFIED: ICD-10-CM

## 2025-08-09 DIAGNOSIS — K29.20 ALCOHOLIC GASTRITIS WITHOUT BLEEDING: ICD-10-CM

## 2025-08-09 DIAGNOSIS — K76.0 FATTY (CHANGE OF) LIVER, NOT ELSEWHERE CLASSIFIED: ICD-10-CM

## 2025-08-09 DIAGNOSIS — K70.30 ALCOHOLIC CIRRHOSIS OF LIVER WITHOUT ASCITES: ICD-10-CM

## 2025-08-09 DIAGNOSIS — Z79.899 OTHER LONG TERM (CURRENT) DRUG THERAPY: ICD-10-CM
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
## 2025-08-09 DIAGNOSIS — F10.139 ALCOHOL ABUSE WITH WITHDRAWAL, UNSPECIFIED: ICD-10-CM

## 2025-08-12 LAB
ETHYL GLUCURONIDE UR CFM-MCNC: SIGNIFICANT CHANGE UP NG/ML
ETHYL GLUCURONIDE UR QL SCN: SIGNIFICANT CHANGE UP
ETHYL SULFATE UR CFM-MCNC: >7500 NG/ML — SIGNIFICANT CHANGE UP

## 2025-08-20 ENCOUNTER — NON-APPOINTMENT (OUTPATIENT)
Age: 60
End: 2025-08-20

## 2025-08-30 ENCOUNTER — INPATIENT (INPATIENT)
Facility: HOSPITAL | Age: 60
LOS: 2 days | Discharge: HOME | End: 2025-09-02
Attending: INTERNAL MEDICINE | Admitting: INTERNAL MEDICINE
Payer: MEDICAID

## 2025-08-30 VITALS
RESPIRATION RATE: 16 BRPM | OXYGEN SATURATION: 98 % | HEART RATE: 108 BPM | WEIGHT: 169.98 LBS | SYSTOLIC BLOOD PRESSURE: 121 MMHG | TEMPERATURE: 99 F | DIASTOLIC BLOOD PRESSURE: 84 MMHG | HEIGHT: 67 IN

## 2025-08-30 DIAGNOSIS — K74.60 UNSPECIFIED CIRRHOSIS OF LIVER: ICD-10-CM

## 2025-08-30 DIAGNOSIS — R10.13 EPIGASTRIC PAIN: ICD-10-CM

## 2025-08-30 DIAGNOSIS — Z29.9 ENCOUNTER FOR PROPHYLACTIC MEASURES, UNSPECIFIED: ICD-10-CM

## 2025-08-30 DIAGNOSIS — F10.939 ALCOHOL USE, UNSPECIFIED WITH WITHDRAWAL, UNSPECIFIED: ICD-10-CM

## 2025-08-30 LAB
ALBUMIN SERPL ELPH-MCNC: 3.6 G/DL — SIGNIFICANT CHANGE UP (ref 3.3–5)
ALP SERPL-CCNC: 78 U/L — SIGNIFICANT CHANGE UP (ref 40–120)
ALT FLD-CCNC: 40 U/L — SIGNIFICANT CHANGE UP (ref 12–78)
ANION GAP SERPL CALC-SCNC: 14 MMOL/L — SIGNIFICANT CHANGE UP (ref 5–17)
ANISOCYTOSIS BLD QL: SLIGHT — SIGNIFICANT CHANGE UP
AST SERPL-CCNC: 46 U/L — HIGH (ref 15–37)
BASOPHILS # BLD AUTO: 0.06 K/UL — SIGNIFICANT CHANGE UP (ref 0–0.2)
BASOPHILS NFR BLD AUTO: 1 % — SIGNIFICANT CHANGE UP (ref 0–2)
BILIRUB SERPL-MCNC: 0.4 MG/DL — SIGNIFICANT CHANGE UP (ref 0.2–1.2)
BUN SERPL-MCNC: 13 MG/DL — SIGNIFICANT CHANGE UP (ref 7–23)
CALCIUM SERPL-MCNC: 8.6 MG/DL — SIGNIFICANT CHANGE UP (ref 8.5–10.1)
CHLORIDE SERPL-SCNC: 111 MMOL/L — HIGH (ref 96–108)
CO2 SERPL-SCNC: 17 MMOL/L — LOW (ref 22–31)
CREAT SERPL-MCNC: 1.08 MG/DL — SIGNIFICANT CHANGE UP (ref 0.5–1.3)
EGFR: 80 ML/MIN/1.73M2 — SIGNIFICANT CHANGE UP
EGFR: 80 ML/MIN/1.73M2 — SIGNIFICANT CHANGE UP
EOSINOPHIL # BLD AUTO: 0.06 K/UL — SIGNIFICANT CHANGE UP (ref 0–0.5)
EOSINOPHIL NFR BLD AUTO: 1 % — SIGNIFICANT CHANGE UP (ref 0–6)
GLUCOSE SERPL-MCNC: 92 MG/DL — SIGNIFICANT CHANGE UP (ref 70–99)
HCT VFR BLD CALC: 45.2 % — SIGNIFICANT CHANGE UP (ref 39–50)
HGB BLD-MCNC: 14.1 G/DL — SIGNIFICANT CHANGE UP (ref 13–17)
IMM GRANULOCYTES NFR BLD AUTO: 0.2 % — SIGNIFICANT CHANGE UP (ref 0–0.9)
LIDOCAIN IGE QN: 36 U/L — SIGNIFICANT CHANGE UP (ref 13–75)
LYMPHOCYTES # BLD AUTO: 3.92 K/UL — HIGH (ref 1–3.3)
LYMPHOCYTES # BLD AUTO: 63.9 % — HIGH (ref 13–44)
MANUAL SMEAR VERIFICATION: SIGNIFICANT CHANGE UP
MCHC RBC-ENTMCNC: 24.4 PG — LOW (ref 27–34)
MCHC RBC-ENTMCNC: 31.2 G/DL — LOW (ref 32–36)
MCV RBC AUTO: 78.2 FL — LOW (ref 80–100)
MONOCYTES # BLD AUTO: 0.35 K/UL — SIGNIFICANT CHANGE UP (ref 0–0.9)
MONOCYTES NFR BLD AUTO: 5.7 % — SIGNIFICANT CHANGE UP (ref 2–14)
NEUTROPHILS # BLD AUTO: 1.73 K/UL — LOW (ref 1.8–7.4)
NEUTROPHILS NFR BLD AUTO: 28.2 % — LOW (ref 43–77)
NRBC BLD AUTO-RTO: 0 /100 WBCS — SIGNIFICANT CHANGE UP (ref 0–0)
OVALOCYTES BLD QL SMEAR: SLIGHT — SIGNIFICANT CHANGE UP
PLAT MORPH BLD: NORMAL — SIGNIFICANT CHANGE UP
PLATELET # BLD AUTO: 289 K/UL — SIGNIFICANT CHANGE UP (ref 150–400)
POIKILOCYTOSIS BLD QL AUTO: SLIGHT — SIGNIFICANT CHANGE UP
POTASSIUM SERPL-MCNC: 4.4 MMOL/L — SIGNIFICANT CHANGE UP (ref 3.5–5.3)
POTASSIUM SERPL-SCNC: 4.4 MMOL/L — SIGNIFICANT CHANGE UP (ref 3.5–5.3)
PROT SERPL-MCNC: 8.5 GM/DL — HIGH (ref 6–8.3)
RBC # BLD: 5.78 M/UL — SIGNIFICANT CHANGE UP (ref 4.2–5.8)
RBC # FLD: 20.9 % — HIGH (ref 10.3–14.5)
RBC BLD AUTO: ABNORMAL
SODIUM SERPL-SCNC: 142 MMOL/L — SIGNIFICANT CHANGE UP (ref 135–145)
TARGETS BLD QL SMEAR: SLIGHT — SIGNIFICANT CHANGE UP
WBC # BLD: 6.13 K/UL — SIGNIFICANT CHANGE UP (ref 3.8–10.5)
WBC # FLD AUTO: 6.13 K/UL — SIGNIFICANT CHANGE UP (ref 3.8–10.5)

## 2025-08-30 PROCEDURE — 74174 CTA ABD&PLVS W/CONTRAST: CPT | Mod: 26

## 2025-08-30 PROCEDURE — 99223 1ST HOSP IP/OBS HIGH 75: CPT

## 2025-08-30 PROCEDURE — 99285 EMERGENCY DEPT VISIT HI MDM: CPT

## 2025-08-30 RX ORDER — ACETAMINOPHEN 500 MG/5ML
650 LIQUID (ML) ORAL EVERY 6 HOURS
Refills: 0 | Status: DISCONTINUED | OUTPATIENT
Start: 2025-08-30 | End: 2025-09-02

## 2025-08-30 RX ORDER — DIAZEPAM 5 MG/1
10 TABLET ORAL ONCE
Refills: 0 | Status: DISCONTINUED | OUTPATIENT
Start: 2025-08-30 | End: 2025-08-30

## 2025-08-30 RX ORDER — B1/B2/B3/B5/B6/B12/VIT C/FOLIC 500-0.5 MG
1 TABLET ORAL DAILY
Refills: 0 | Status: DISCONTINUED | OUTPATIENT
Start: 2025-08-30 | End: 2025-09-02

## 2025-08-30 RX ORDER — ONDANSETRON HCL/PF 4 MG/2 ML
4 VIAL (ML) INJECTION EVERY 8 HOURS
Refills: 0 | Status: DISCONTINUED | OUTPATIENT
Start: 2025-08-30 | End: 2025-08-31

## 2025-08-30 RX ORDER — DIAZEPAM 5 MG/1
TABLET ORAL
Refills: 0 | Status: DISCONTINUED | OUTPATIENT
Start: 2025-08-30 | End: 2025-08-31

## 2025-08-30 RX ORDER — DIAZEPAM 5 MG/1
5 TABLET ORAL ONCE
Refills: 0 | Status: DISCONTINUED | OUTPATIENT
Start: 2025-08-30 | End: 2025-08-30

## 2025-08-30 RX ORDER — FOLIC ACID 1 MG/1
1 TABLET ORAL DAILY
Refills: 0 | Status: DISCONTINUED | OUTPATIENT
Start: 2025-08-30 | End: 2025-09-02

## 2025-08-30 RX ORDER — DIAZEPAM 5 MG/1
10 TABLET ORAL
Refills: 0 | Status: DISCONTINUED | OUTPATIENT
Start: 2025-08-30 | End: 2025-08-31

## 2025-08-30 RX ORDER — MAGNESIUM, ALUMINUM HYDROXIDE 200-200 MG
30 TABLET,CHEWABLE ORAL EVERY 4 HOURS
Refills: 0 | Status: DISCONTINUED | OUTPATIENT
Start: 2025-08-30 | End: 2025-09-02

## 2025-08-30 RX ORDER — MELATONIN 5 MG
3 TABLET ORAL AT BEDTIME
Refills: 0 | Status: DISCONTINUED | OUTPATIENT
Start: 2025-08-30 | End: 2025-08-31

## 2025-08-30 RX ORDER — DIAZEPAM 5 MG/1
10 TABLET ORAL EVERY 6 HOURS
Refills: 0 | Status: DISCONTINUED | OUTPATIENT
Start: 2025-08-30 | End: 2025-08-31

## 2025-08-30 RX ORDER — DIAZEPAM 5 MG/1
TABLET ORAL
Refills: 0 | Status: DISCONTINUED | OUTPATIENT
Start: 2025-08-30 | End: 2025-08-30

## 2025-08-30 RX ORDER — FERROUS SULFATE 137(45) MG
325 TABLET, EXTENDED RELEASE ORAL DAILY
Refills: 0 | Status: DISCONTINUED | OUTPATIENT
Start: 2025-08-30 | End: 2025-09-02

## 2025-08-30 RX ADMIN — Medication 4 MILLIGRAM(S): at 20:49

## 2025-08-30 RX ADMIN — DIAZEPAM 10 MILLIGRAM(S): 5 TABLET ORAL at 22:00

## 2025-08-30 RX ADMIN — Medication 4 MILLIGRAM(S): at 17:17

## 2025-08-30 RX ADMIN — DIAZEPAM 5 MILLIGRAM(S): 5 TABLET ORAL at 19:40

## 2025-08-30 RX ADMIN — DIAZEPAM 10 MILLIGRAM(S): 5 TABLET ORAL at 17:17

## 2025-08-30 RX ADMIN — Medication 3 MILLIGRAM(S): at 23:36

## 2025-08-30 RX ADMIN — Medication 4 MILLIGRAM(S): at 18:17

## 2025-08-30 RX ADMIN — Medication 1000 MILLILITER(S): at 19:07

## 2025-08-30 RX ADMIN — Medication 3 MILLIGRAM(S): at 21:10

## 2025-08-30 RX ADMIN — Medication 3 MILLIGRAM(S): at 20:43

## 2025-08-31 LAB
ALBUMIN SERPL ELPH-MCNC: 3.7 G/DL — SIGNIFICANT CHANGE UP (ref 3.3–5)
ALP SERPL-CCNC: 77 U/L — SIGNIFICANT CHANGE UP (ref 40–120)
ALT FLD-CCNC: 40 U/L — SIGNIFICANT CHANGE UP (ref 12–78)
AMMONIA BLD-MCNC: 60 UMOL/L — HIGH (ref 11–32)
ANION GAP SERPL CALC-SCNC: 15 MMOL/L — SIGNIFICANT CHANGE UP (ref 5–17)
AST SERPL-CCNC: 44 U/L — HIGH (ref 15–37)
BILIRUB SERPL-MCNC: 1.2 MG/DL — SIGNIFICANT CHANGE UP (ref 0.2–1.2)
BUN SERPL-MCNC: 17 MG/DL — SIGNIFICANT CHANGE UP (ref 7–23)
CALCIUM SERPL-MCNC: 9 MG/DL — SIGNIFICANT CHANGE UP (ref 8.5–10.1)
CHLORIDE SERPL-SCNC: 112 MMOL/L — HIGH (ref 96–108)
CO2 SERPL-SCNC: 16 MMOL/L — LOW (ref 22–31)
CREAT SERPL-MCNC: 1.21 MG/DL — SIGNIFICANT CHANGE UP (ref 0.5–1.3)
EGFR: 69 ML/MIN/1.73M2 — SIGNIFICANT CHANGE UP
EGFR: 69 ML/MIN/1.73M2 — SIGNIFICANT CHANGE UP
GLUCOSE BLDC GLUCOMTR-MCNC: 80 MG/DL — SIGNIFICANT CHANGE UP (ref 70–99)
GLUCOSE BLDC GLUCOMTR-MCNC: 92 MG/DL — SIGNIFICANT CHANGE UP (ref 70–99)
GLUCOSE SERPL-MCNC: 83 MG/DL — SIGNIFICANT CHANGE UP (ref 70–99)
HCT VFR BLD CALC: 42.7 % — SIGNIFICANT CHANGE UP (ref 39–50)
HGB BLD-MCNC: 13.4 G/DL — SIGNIFICANT CHANGE UP (ref 13–17)
MCHC RBC-ENTMCNC: 24.3 PG — LOW (ref 27–34)
MCHC RBC-ENTMCNC: 31.4 G/DL — LOW (ref 32–36)
MCV RBC AUTO: 77.4 FL — LOW (ref 80–100)
NRBC BLD AUTO-RTO: 0 /100 WBCS — SIGNIFICANT CHANGE UP (ref 0–0)
PLATELET # BLD AUTO: 188 K/UL — SIGNIFICANT CHANGE UP (ref 150–400)
POTASSIUM SERPL-MCNC: 4.7 MMOL/L — SIGNIFICANT CHANGE UP (ref 3.5–5.3)
POTASSIUM SERPL-SCNC: 4.7 MMOL/L — SIGNIFICANT CHANGE UP (ref 3.5–5.3)
PROT SERPL-MCNC: 8.3 GM/DL — SIGNIFICANT CHANGE UP (ref 6–8.3)
RBC # BLD: 5.52 M/UL — SIGNIFICANT CHANGE UP (ref 4.2–5.8)
RBC # FLD: 20.7 % — HIGH (ref 10.3–14.5)
SODIUM SERPL-SCNC: 143 MMOL/L — SIGNIFICANT CHANGE UP (ref 135–145)
WBC # BLD: 7.27 K/UL — SIGNIFICANT CHANGE UP (ref 3.8–10.5)
WBC # FLD AUTO: 7.27 K/UL — SIGNIFICANT CHANGE UP (ref 3.8–10.5)

## 2025-08-31 PROCEDURE — 99291 CRITICAL CARE FIRST HOUR: CPT

## 2025-08-31 PROCEDURE — 76937 US GUIDE VASCULAR ACCESS: CPT | Mod: 26,59

## 2025-08-31 PROCEDURE — 99233 SBSQ HOSP IP/OBS HIGH 50: CPT

## 2025-08-31 PROCEDURE — 99223 1ST HOSP IP/OBS HIGH 75: CPT

## 2025-08-31 PROCEDURE — 36000 PLACE NEEDLE IN VEIN: CPT | Mod: 76,59

## 2025-08-31 PROCEDURE — 93010 ELECTROCARDIOGRAM REPORT: CPT

## 2025-08-31 RX ORDER — SODIUM CHLORIDE 9 G/1000ML
1000 INJECTION, SOLUTION INTRAVENOUS
Refills: 0 | Status: DISCONTINUED | OUTPATIENT
Start: 2025-08-31 | End: 2025-09-01

## 2025-08-31 RX ORDER — DIAZEPAM 5 MG/1
10 TABLET ORAL ONCE
Refills: 0 | Status: DISCONTINUED | OUTPATIENT
Start: 2025-08-31 | End: 2025-08-31

## 2025-08-31 RX ORDER — PHENOBARBITAL 30 MG/1
390 TABLET ORAL ONCE
Refills: 0 | Status: DISCONTINUED | OUTPATIENT
Start: 2025-08-31 | End: 2025-08-31

## 2025-08-31 RX ORDER — PHENOBARBITAL 30 MG/1
130 TABLET ORAL ONCE
Refills: 0 | Status: DISCONTINUED | OUTPATIENT
Start: 2025-08-31 | End: 2025-08-31

## 2025-08-31 RX ORDER — PHENOBARBITAL 30 MG/1
390 TABLET ORAL
Refills: 0 | Status: DISCONTINUED | OUTPATIENT
Start: 2025-08-31 | End: 2025-08-31

## 2025-08-31 RX ORDER — PHENOBARBITAL 30 MG/1
130 TABLET ORAL
Refills: 0 | Status: DISCONTINUED | OUTPATIENT
Start: 2025-08-31 | End: 2025-08-31

## 2025-08-31 RX ORDER — CHLORDIAZEPOXIDE HCL 10 MG
50 CAPSULE ORAL EVERY 6 HOURS
Refills: 0 | Status: DISCONTINUED | OUTPATIENT
Start: 2025-08-31 | End: 2025-08-31

## 2025-08-31 RX ORDER — ONDANSETRON HCL/PF 4 MG/2 ML
4 VIAL (ML) INJECTION EVERY 8 HOURS
Refills: 0 | Status: DISCONTINUED | OUTPATIENT
Start: 2025-08-31 | End: 2025-09-02

## 2025-08-31 RX ORDER — ACETAMINOPHEN 500 MG/5ML
1000 LIQUID (ML) ORAL ONCE
Refills: 0 | Status: COMPLETED | OUTPATIENT
Start: 2025-08-31 | End: 2025-08-31

## 2025-08-31 RX ADMIN — Medication 1 APPLICATION(S): at 12:30

## 2025-08-31 RX ADMIN — PHENOBARBITAL 206 MILLIGRAM(S): 30 TABLET ORAL at 12:54

## 2025-08-31 RX ADMIN — DIAZEPAM 10 MILLIGRAM(S): 5 TABLET ORAL at 00:05

## 2025-08-31 RX ADMIN — Medication 40 MILLIGRAM(S): at 12:22

## 2025-08-31 RX ADMIN — DIAZEPAM 10 MILLIGRAM(S): 5 TABLET ORAL at 06:39

## 2025-08-31 RX ADMIN — FOLIC ACID 1 MILLIGRAM(S): 1 TABLET ORAL at 12:12

## 2025-08-31 RX ADMIN — Medication 650 MILLIGRAM(S): at 21:21

## 2025-08-31 RX ADMIN — PHENOBARBITAL 412 MILLIGRAM(S): 30 TABLET ORAL at 09:30

## 2025-08-31 RX ADMIN — Medication 105 MILLIGRAM(S): at 14:52

## 2025-08-31 RX ADMIN — Medication 3 MILLIGRAM(S): at 00:51

## 2025-08-31 RX ADMIN — Medication 400 MILLIGRAM(S): at 10:36

## 2025-08-31 RX ADMIN — Medication 105 MILLIGRAM(S): at 21:32

## 2025-08-31 RX ADMIN — Medication 4 MILLIGRAM(S): at 09:01

## 2025-08-31 RX ADMIN — PHENOBARBITAL 130 MILLIGRAM(S): 30 TABLET ORAL at 06:53

## 2025-08-31 RX ADMIN — Medication 1 TABLET(S): at 12:11

## 2025-08-31 RX ADMIN — SODIUM CHLORIDE 75 MILLILITER(S): 9 INJECTION, SOLUTION INTRAVENOUS at 21:22

## 2025-08-31 RX ADMIN — SODIUM CHLORIDE 75 MILLILITER(S): 9 INJECTION, SOLUTION INTRAVENOUS at 09:30

## 2025-08-31 RX ADMIN — Medication 325 MILLIGRAM(S): at 12:12

## 2025-08-31 RX ADMIN — Medication 4 MILLIGRAM(S): at 02:16

## 2025-08-31 RX ADMIN — PHENOBARBITAL 130 MILLIGRAM(S): 30 TABLET ORAL at 21:21

## 2025-08-31 RX ADMIN — PHENOBARBITAL 130 MILLIGRAM(S): 30 TABLET ORAL at 08:25

## 2025-08-31 RX ADMIN — PHENOBARBITAL 206 MILLIGRAM(S): 30 TABLET ORAL at 16:02

## 2025-08-31 RX ADMIN — Medication 1000 MILLIGRAM(S): at 11:20

## 2025-08-31 RX ADMIN — DIAZEPAM 10 MILLIGRAM(S): 5 TABLET ORAL at 02:00

## 2025-08-31 RX ADMIN — Medication 4 MILLIGRAM(S): at 01:26

## 2025-08-31 RX ADMIN — Medication 650 MILLIGRAM(S): at 22:21

## 2025-09-01 LAB
ALBUMIN SERPL ELPH-MCNC: 3.3 G/DL — SIGNIFICANT CHANGE UP (ref 3.3–5)
ALP SERPL-CCNC: 66 U/L — SIGNIFICANT CHANGE UP (ref 40–120)
ALT FLD-CCNC: 33 U/L — SIGNIFICANT CHANGE UP (ref 12–78)
ANION GAP SERPL CALC-SCNC: 7 MMOL/L — SIGNIFICANT CHANGE UP (ref 5–17)
AST SERPL-CCNC: 34 U/L — SIGNIFICANT CHANGE UP (ref 15–37)
BILIRUB SERPL-MCNC: 1.3 MG/DL — HIGH (ref 0.2–1.2)
BUN SERPL-MCNC: 14 MG/DL — SIGNIFICANT CHANGE UP (ref 7–23)
CALCIUM SERPL-MCNC: 8.5 MG/DL — SIGNIFICANT CHANGE UP (ref 8.5–10.1)
CHLORIDE SERPL-SCNC: 110 MMOL/L — HIGH (ref 96–108)
CO2 SERPL-SCNC: 25 MMOL/L — SIGNIFICANT CHANGE UP (ref 22–31)
CREAT SERPL-MCNC: 1.1 MG/DL — SIGNIFICANT CHANGE UP (ref 0.5–1.3)
EGFR: 78 ML/MIN/1.73M2 — SIGNIFICANT CHANGE UP
EGFR: 78 ML/MIN/1.73M2 — SIGNIFICANT CHANGE UP
GLUCOSE SERPL-MCNC: 154 MG/DL — HIGH (ref 70–99)
HCT VFR BLD CALC: 38.5 % — LOW (ref 39–50)
HGB BLD-MCNC: 12 G/DL — LOW (ref 13–17)
INR BLD: 1.1 RATIO — SIGNIFICANT CHANGE UP (ref 0.85–1.16)
MAGNESIUM SERPL-MCNC: 1.6 MG/DL — SIGNIFICANT CHANGE UP (ref 1.6–2.6)
MCHC RBC-ENTMCNC: 24.7 PG — LOW (ref 27–34)
MCHC RBC-ENTMCNC: 31.2 G/DL — LOW (ref 32–36)
MCV RBC AUTO: 79.4 FL — LOW (ref 80–100)
NRBC BLD AUTO-RTO: 0 /100 WBCS — SIGNIFICANT CHANGE UP (ref 0–0)
PHENOBARB SERPL-MCNC: 32.7 UG/ML — SIGNIFICANT CHANGE UP (ref 15–40)
PHOSPHATE SERPL-MCNC: 2.6 MG/DL — SIGNIFICANT CHANGE UP (ref 2.5–4.5)
PLATELET # BLD AUTO: 155 K/UL — SIGNIFICANT CHANGE UP (ref 150–400)
POTASSIUM SERPL-MCNC: 3.1 MMOL/L — LOW (ref 3.5–5.3)
POTASSIUM SERPL-SCNC: 3.1 MMOL/L — LOW (ref 3.5–5.3)
PROT SERPL-MCNC: 7.4 GM/DL — SIGNIFICANT CHANGE UP (ref 6–8.3)
PROTHROM AB SERPL-ACNC: 12.7 SEC — SIGNIFICANT CHANGE UP (ref 9.9–13.4)
RBC # BLD: 4.85 M/UL — SIGNIFICANT CHANGE UP (ref 4.2–5.8)
RBC # FLD: 19.4 % — HIGH (ref 10.3–14.5)
SODIUM SERPL-SCNC: 142 MMOL/L — SIGNIFICANT CHANGE UP (ref 135–145)
WBC # BLD: 2.85 K/UL — LOW (ref 3.8–10.5)
WBC # FLD AUTO: 2.85 K/UL — LOW (ref 3.8–10.5)

## 2025-09-01 PROCEDURE — 99291 CRITICAL CARE FIRST HOUR: CPT

## 2025-09-01 RX ORDER — MAGNESIUM SULFATE 500 MG/ML
2 SYRINGE (ML) INJECTION ONCE
Refills: 0 | Status: COMPLETED | OUTPATIENT
Start: 2025-09-01 | End: 2025-09-01

## 2025-09-01 RX ORDER — ENOXAPARIN SODIUM 100 MG/ML
40 INJECTION SUBCUTANEOUS EVERY 24 HOURS
Refills: 0 | Status: DISCONTINUED | OUTPATIENT
Start: 2025-09-01 | End: 2025-09-02

## 2025-09-01 RX ADMIN — Medication 325 MILLIGRAM(S): at 11:15

## 2025-09-01 RX ADMIN — Medication 105 MILLIGRAM(S): at 13:10

## 2025-09-01 RX ADMIN — Medication 1 APPLICATION(S): at 11:21

## 2025-09-01 RX ADMIN — FOLIC ACID 1 MILLIGRAM(S): 1 TABLET ORAL at 11:15

## 2025-09-01 RX ADMIN — Medication 40 MILLIEQUIVALENT(S): at 06:25

## 2025-09-01 RX ADMIN — Medication 40 MILLIGRAM(S): at 11:15

## 2025-09-01 RX ADMIN — Medication 105 MILLIGRAM(S): at 21:45

## 2025-09-01 RX ADMIN — Medication 1 TABLET(S): at 11:15

## 2025-09-01 RX ADMIN — Medication 650 MILLIGRAM(S): at 19:49

## 2025-09-01 RX ADMIN — ENOXAPARIN SODIUM 40 MILLIGRAM(S): 100 INJECTION SUBCUTANEOUS at 17:04

## 2025-09-01 RX ADMIN — Medication 650 MILLIGRAM(S): at 20:50

## 2025-09-01 RX ADMIN — Medication 40 MILLIEQUIVALENT(S): at 09:25

## 2025-09-01 RX ADMIN — Medication 105 MILLIGRAM(S): at 05:15

## 2025-09-01 RX ADMIN — Medication 25 GRAM(S): at 06:25

## 2025-09-02 ENCOUNTER — TRANSCRIPTION ENCOUNTER (OUTPATIENT)
Age: 60
End: 2025-09-02

## 2025-09-02 VITALS
RESPIRATION RATE: 12 BRPM | SYSTOLIC BLOOD PRESSURE: 132 MMHG | DIASTOLIC BLOOD PRESSURE: 93 MMHG | HEART RATE: 74 BPM | OXYGEN SATURATION: 94 %

## 2025-09-02 LAB
ALBUMIN SERPL ELPH-MCNC: 2.9 G/DL — LOW (ref 3.3–5)
ALP SERPL-CCNC: 64 U/L — SIGNIFICANT CHANGE UP (ref 40–120)
ALT FLD-CCNC: 33 U/L — SIGNIFICANT CHANGE UP (ref 12–78)
ANION GAP SERPL CALC-SCNC: 5 MMOL/L — SIGNIFICANT CHANGE UP (ref 5–17)
AST SERPL-CCNC: 40 U/L — HIGH (ref 15–37)
BILIRUB SERPL-MCNC: 0.8 MG/DL — SIGNIFICANT CHANGE UP (ref 0.2–1.2)
BUN SERPL-MCNC: 7 MG/DL — SIGNIFICANT CHANGE UP (ref 7–23)
CALCIUM SERPL-MCNC: 8.2 MG/DL — LOW (ref 8.5–10.1)
CHLORIDE SERPL-SCNC: 111 MMOL/L — HIGH (ref 96–108)
CO2 SERPL-SCNC: 24 MMOL/L — SIGNIFICANT CHANGE UP (ref 22–31)
CREAT SERPL-MCNC: 0.84 MG/DL — SIGNIFICANT CHANGE UP (ref 0.5–1.3)
EGFR: 101 ML/MIN/1.73M2 — SIGNIFICANT CHANGE UP
EGFR: 101 ML/MIN/1.73M2 — SIGNIFICANT CHANGE UP
GLUCOSE SERPL-MCNC: 89 MG/DL — SIGNIFICANT CHANGE UP (ref 70–99)
HCT VFR BLD CALC: 35.6 % — LOW (ref 39–50)
HGB BLD-MCNC: 11.2 G/DL — LOW (ref 13–17)
INR BLD: 1.18 RATIO — HIGH (ref 0.85–1.16)
MAGNESIUM SERPL-MCNC: 1.5 MG/DL — LOW (ref 1.6–2.6)
MCHC RBC-ENTMCNC: 25 PG — LOW (ref 27–34)
MCHC RBC-ENTMCNC: 31.5 G/DL — LOW (ref 32–36)
MCV RBC AUTO: 79.5 FL — LOW (ref 80–100)
MELD SCORE WITH DIALYSIS: 22 POINTS — SIGNIFICANT CHANGE UP
MELD SCORE WITHOUT DIALYSIS: 8 POINTS — SIGNIFICANT CHANGE UP
NRBC BLD AUTO-RTO: 0 /100 WBCS — SIGNIFICANT CHANGE UP (ref 0–0)
PHOSPHATE SERPL-MCNC: 2.6 MG/DL — SIGNIFICANT CHANGE UP (ref 2.5–4.5)
PLATELET # BLD AUTO: 140 K/UL — LOW (ref 150–400)
POTASSIUM SERPL-MCNC: 3.9 MMOL/L — SIGNIFICANT CHANGE UP (ref 3.5–5.3)
POTASSIUM SERPL-SCNC: 3.9 MMOL/L — SIGNIFICANT CHANGE UP (ref 3.5–5.3)
PROT SERPL-MCNC: 6.6 GM/DL — SIGNIFICANT CHANGE UP (ref 6–8.3)
PROTHROM AB SERPL-ACNC: 13.7 SEC — HIGH (ref 9.9–13.4)
RBC # BLD: 4.48 M/UL — SIGNIFICANT CHANGE UP (ref 4.2–5.8)
RBC # FLD: 18.6 % — HIGH (ref 10.3–14.5)
SODIUM SERPL-SCNC: 140 MMOL/L — SIGNIFICANT CHANGE UP (ref 135–145)
WBC # BLD: 2.48 K/UL — LOW (ref 3.8–10.5)
WBC # FLD AUTO: 2.48 K/UL — LOW (ref 3.8–10.5)

## 2025-09-02 PROCEDURE — 99239 HOSP IP/OBS DSCHRG MGMT >30: CPT

## 2025-09-02 RX ORDER — MAGNESIUM SULFATE 500 MG/ML
2 SYRINGE (ML) INJECTION ONCE
Refills: 0 | Status: COMPLETED | OUTPATIENT
Start: 2025-09-02 | End: 2025-09-02

## 2025-09-02 RX ADMIN — Medication 1 TABLET(S): at 07:29

## 2025-09-02 RX ADMIN — Medication 25 GRAM(S): at 04:45

## 2025-09-02 RX ADMIN — Medication 40 MILLIGRAM(S): at 07:29

## 2025-09-02 RX ADMIN — FOLIC ACID 1 MILLIGRAM(S): 1 TABLET ORAL at 07:29

## 2025-09-02 RX ADMIN — Medication 105 MILLIGRAM(S): at 05:58

## 2025-09-02 RX ADMIN — Medication 1 APPLICATION(S): at 07:30

## 2025-09-02 RX ADMIN — Medication 325 MILLIGRAM(S): at 07:29

## 2025-09-06 DIAGNOSIS — E83.42 HYPOMAGNESEMIA: ICD-10-CM

## 2025-09-06 DIAGNOSIS — E87.6 HYPOKALEMIA: ICD-10-CM

## 2025-09-06 DIAGNOSIS — F10.231 ALCOHOL DEPENDENCE WITH WITHDRAWAL DELIRIUM: ICD-10-CM

## 2025-09-06 DIAGNOSIS — K86.1 OTHER CHRONIC PANCREATITIS: ICD-10-CM

## 2025-09-06 DIAGNOSIS — K27.9 PEPTIC ULCER, SITE UNSPECIFIED, UNSPECIFIED AS ACUTE OR CHRONIC, WITHOUT HEMORRHAGE OR PERFORATION: ICD-10-CM

## 2025-09-06 DIAGNOSIS — G92.8 OTHER TOXIC ENCEPHALOPATHY: ICD-10-CM

## 2025-09-06 DIAGNOSIS — K74.60 UNSPECIFIED CIRRHOSIS OF LIVER: ICD-10-CM
